# Patient Record
Sex: FEMALE | Race: WHITE | NOT HISPANIC OR LATINO | Employment: UNEMPLOYED | URBAN - METROPOLITAN AREA
[De-identification: names, ages, dates, MRNs, and addresses within clinical notes are randomized per-mention and may not be internally consistent; named-entity substitution may affect disease eponyms.]

---

## 2017-01-08 PROBLEM — R76.8 HCV ANTIBODY POSITIVE: Status: ACTIVE | Noted: 2017-01-08

## 2017-01-08 PROBLEM — B18.2 HEP C W/O COMA, CHRONIC: Status: ACTIVE | Noted: 2017-01-08

## 2017-03-14 PROBLEM — D64.9 SEVERE ANEMIA: Status: ACTIVE | Noted: 2017-03-14

## 2017-03-15 PROBLEM — F19.90 ILLICIT DRUG USE: Status: ACTIVE | Noted: 2017-03-15

## 2017-03-20 ENCOUNTER — NURSE TRIAGE (OUTPATIENT)
Dept: ADMINISTRATIVE | Facility: CLINIC | Age: 37
End: 2017-03-20

## 2017-03-20 ENCOUNTER — PATIENT OUTREACH (OUTPATIENT)
Dept: ADMINISTRATIVE | Facility: CLINIC | Age: 37
End: 2017-03-20
Payer: MEDICAID

## 2017-03-20 PROBLEM — A41.9 SEVERE SEPSIS: Status: ACTIVE | Noted: 2017-03-20

## 2017-03-20 PROBLEM — R65.20 SEVERE SEPSIS: Status: ACTIVE | Noted: 2017-03-20

## 2017-03-20 PROBLEM — R50.9 FEVER OF UNKNOWN ORIGIN: Status: ACTIVE | Noted: 2017-03-20

## 2017-03-20 NOTE — TELEPHONE ENCOUNTER
"  Reason for Disposition   MODERATE difficulty breathing (e.g., speaks in phrases, SOB even at rest, pulse 100-120) of new onset or worse than normal    Protocols used: ST BREATHING DIFFICULTY-A-OH      C3 nurse contacted Rachel Zazueta for a [facility] Discharge Follow Up call.    She reports she is having SOB, even at rest, that began today.    She is also "hurting all over."  Has 10/10 pain in lower back, around spin and bone marrow biopsy site.  States this pain is worse today then in was in hospital.    Also, she feels it hurts in chest when she swallows.  Describes it as heartburn.  States this began yesterday and she took Tums.  States it is still present today.  Denies chest pain during call.    Oral temp of 100.1 during call.    Patient advised to go to ED now per OOC protocol.  She verbalized understanding and will have S/O drive her.    "

## 2017-03-20 NOTE — PATIENT INSTRUCTIONS
Anemia, Type Not Specified (Adult)  Red blood cells carry oxygen to the tissues of your body. Anemia is a condition in which you have too few red blood cells. You need iron to make red blood cells. The most common cause of anemia is not having enough iron. This may be because of:  · Loss of blood. This can be caused by heavy menstrual periods. It can also be caused by bleeding from the stomach or intestines.  · Poor diet. You may not be eating enough foods that contain iron.  Other causes of anemia include certain vitamin deficiencies, chronic kidney disease, and certain other chronic illnesses.  Anemia makes you to feel tired and run down. When anemia becomes severe, your skin becomes pale. You may feel short of breath after physical activity. Other symptoms include:  · Headaches  · Dizziness  · Leg cramps with physical activity  · Drowsiness  Home care  Follow these guidelines when caring for yourself at home:  · Dont overexert yourself.  · Talk with your healthcare provider before traveling by air or traveling to high altitudes.  Follow-up care  Follow up with your healthcare provider, or as advised. You may need other blood tests to find out the exact cause of your anemia. If you had testing done today, it may take several days to get all of the results. You can follow up with your own healthcare provider to get the results.  When to seek medical advice  Call your healthcare provider right away if any of these occur:  · Shortness of breath or chest pain  · Dizziness or fainting gets worse  · Vomiting blood or passing red or black-colored stool  Date Last Reviewed: 2/25/2016  © 6387-6200 [employer]. [address] [place] [address], [place] [address]. All rights reserved. This information is not intended as a substitute for professional medical care. Always follow your healthcare professional's instructions.

## 2017-03-22 PROBLEM — R78.81 BACTEREMIA DUE TO GRAM-POSITIVE BACTERIA: Status: ACTIVE | Noted: 2017-03-20

## 2017-03-24 PROBLEM — B95.1 BACTEREMIA DUE TO GROUP B STREPTOCOCCUS: Status: ACTIVE | Noted: 2017-03-20

## 2018-10-04 PROBLEM — L03.115 CELLULITIS OF RIGHT LOWER EXTREMITY: Status: ACTIVE | Noted: 2018-10-04

## 2018-10-18 ENCOUNTER — TELEPHONE (OUTPATIENT)
Dept: ADMINISTRATIVE | Facility: HOSPITAL | Age: 38
End: 2018-10-18

## 2018-10-18 NOTE — TELEPHONE ENCOUNTER
Called spoke with patients family member verified patients name and , informed of the appointment reschedule date and time. Voiced understanding

## 2018-10-31 ENCOUNTER — TELEPHONE (OUTPATIENT)
Dept: ADMINISTRATIVE | Facility: HOSPITAL | Age: 38
End: 2018-10-31

## 2018-11-24 PROBLEM — M79.661 PAIN AND SWELLING OF RIGHT LOWER LEG: Status: ACTIVE | Noted: 2018-11-24

## 2018-11-24 PROBLEM — L03.90 CELLULITIS: Status: ACTIVE | Noted: 2018-11-24

## 2018-11-24 PROBLEM — M79.89 PAIN AND SWELLING OF RIGHT LOWER LEG: Status: ACTIVE | Noted: 2018-11-24

## 2019-05-13 PROBLEM — L03.116 CELLULITIS OF LEFT LOWER EXTREMITY: Status: ACTIVE | Noted: 2018-10-04

## 2019-05-14 PROBLEM — L03.116 LEFT LEG CELLULITIS: Status: RESOLVED | Noted: 2019-05-14 | Resolved: 2019-05-14

## 2019-05-14 PROBLEM — L03.116 LEFT LEG CELLULITIS: Status: ACTIVE | Noted: 2019-05-14

## 2019-05-15 PROBLEM — L03.116 LEFT LEG CELLULITIS: Status: ACTIVE | Noted: 2019-05-15

## 2019-05-16 PROBLEM — L03.116 LEFT LEG CELLULITIS: Status: RESOLVED | Noted: 2019-05-15 | Resolved: 2019-05-16

## 2019-05-24 PROBLEM — K74.60 CHRONIC HEPATITIS C WITH CIRRHOSIS: Status: ACTIVE | Noted: 2017-01-08

## 2021-03-20 ENCOUNTER — HOSPITAL ENCOUNTER (EMERGENCY)
Facility: HOSPITAL | Age: 41
Discharge: HOME | End: 2021-03-20
Attending: EMERGENCY MEDICINE
Payer: MEDICAID

## 2021-03-20 VITALS
OXYGEN SATURATION: 100 % | HEART RATE: 89 BPM | DIASTOLIC BLOOD PRESSURE: 64 MMHG | BODY MASS INDEX: 45.99 KG/M2 | TEMPERATURE: 98 F | SYSTOLIC BLOOD PRESSURE: 113 MMHG | WEIGHT: 293 LBS | HEIGHT: 67 IN | RESPIRATION RATE: 16 BRPM

## 2021-03-20 DIAGNOSIS — M54.50 LUMBAR PAIN WITH RADIATION DOWN LEFT LEG: Primary | ICD-10-CM

## 2021-03-20 DIAGNOSIS — N30.00 ACUTE CYSTITIS WITHOUT HEMATURIA: ICD-10-CM

## 2021-03-20 DIAGNOSIS — M79.605 LUMBAR PAIN WITH RADIATION DOWN LEFT LEG: Primary | ICD-10-CM

## 2021-03-20 LAB
ALBUMIN SERPL BCP-MCNC: 2.3 G/DL (ref 3.5–5.2)
ALP SERPL-CCNC: 82 U/L (ref 55–135)
ALT SERPL W/O P-5'-P-CCNC: 40 U/L (ref 10–44)
AMMONIA PLAS-SCNC: 40 UMOL/L (ref 10–50)
AMPHET+METHAMPHET UR QL: ABNORMAL
ANION GAP SERPL CALC-SCNC: 4 MMOL/L (ref 8–16)
AST SERPL-CCNC: 43 U/L (ref 10–40)
BACTERIA #/AREA URNS HPF: NEGATIVE /HPF
BARBITURATES UR QL SCN>200 NG/ML: NEGATIVE
BASOPHILS # BLD AUTO: 0.05 K/UL (ref 0–0.2)
BASOPHILS NFR BLD: 1 % (ref 0–1.9)
BENZODIAZ UR QL SCN>200 NG/ML: NEGATIVE
BILIRUB SERPL-MCNC: 1.6 MG/DL (ref 0.1–1)
BILIRUB UR QL STRIP: NEGATIVE
BUN SERPL-MCNC: 9 MG/DL (ref 6–20)
BZE UR QL SCN: NEGATIVE
CALCIUM SERPL-MCNC: 7.4 MG/DL (ref 8.7–10.5)
CANNABINOIDS UR QL SCN: NEGATIVE
CHLORIDE SERPL-SCNC: 108 MMOL/L (ref 95–110)
CLARITY UR: CLEAR
CO2 SERPL-SCNC: 28 MMOL/L (ref 23–29)
COLOR UR: YELLOW
CREAT SERPL-MCNC: 0.6 MG/DL (ref 0.5–1.4)
CREAT UR-MCNC: <13 MG/DL (ref 15–325)
DIFFERENTIAL METHOD: ABNORMAL
EOSINOPHIL # BLD AUTO: 0 K/UL (ref 0–0.5)
EOSINOPHIL NFR BLD: 0.8 % (ref 0–8)
ERYTHROCYTE [DISTWIDTH] IN BLOOD BY AUTOMATED COUNT: 14.7 % (ref 11.5–14.5)
EST. GFR  (AFRICAN AMERICAN): >60 ML/MIN/1.73 M^2
EST. GFR  (NON AFRICAN AMERICAN): >60 ML/MIN/1.73 M^2
GLUCOSE SERPL-MCNC: 87 MG/DL (ref 70–110)
GLUCOSE UR QL STRIP: NEGATIVE
HCT VFR BLD AUTO: 36.1 % (ref 37–48.5)
HGB BLD-MCNC: 12 G/DL (ref 12–16)
HGB UR QL STRIP: ABNORMAL
HYALINE CASTS #/AREA URNS LPF: 3 /LPF
IMM GRANULOCYTES # BLD AUTO: 0.18 K/UL (ref 0–0.04)
IMM GRANULOCYTES NFR BLD AUTO: 3.6 % (ref 0–0.5)
INR PPP: 1.3 (ref 0.8–1.2)
KETONES UR QL STRIP: NEGATIVE
LEUKOCYTE ESTERASE UR QL STRIP: NEGATIVE
LIPASE SERPL-CCNC: 166 U/L (ref 23–300)
LYMPHOCYTES # BLD AUTO: 0.6 K/UL (ref 1–4.8)
LYMPHOCYTES NFR BLD: 12.5 % (ref 18–48)
MCH RBC QN AUTO: 29.1 PG (ref 27–31)
MCHC RBC AUTO-ENTMCNC: 33.2 G/DL (ref 32–36)
MCV RBC AUTO: 88 FL (ref 82–98)
METHADONE UR QL SCN>300 NG/ML: NEGATIVE
MICROSCOPIC COMMENT: ABNORMAL
MONOCYTES # BLD AUTO: 0.8 K/UL (ref 0.3–1)
MONOCYTES NFR BLD: 15.5 % (ref 4–15)
NEUTROPHILS # BLD AUTO: 3.4 K/UL (ref 1.8–7.7)
NEUTROPHILS NFR BLD: 66.6 % (ref 38–73)
NITRITE UR QL STRIP: POSITIVE
NRBC BLD-RTO: 0 /100 WBC
OPIATES UR QL SCN: NEGATIVE
PCP UR QL SCN>25 NG/ML: NEGATIVE
PH UR STRIP: 5 [PH] (ref 5–8)
PLATELET # BLD AUTO: 40 K/UL (ref 150–350)
PMV BLD AUTO: 10.9 FL (ref 9.2–12.9)
POTASSIUM SERPL-SCNC: 3.7 MMOL/L (ref 3.5–5.1)
PROT SERPL-MCNC: 6.1 G/DL (ref 6–8.4)
PROT UR QL STRIP: NEGATIVE
PROTHROMBIN TIME: 13.5 SEC (ref 9–12.5)
RBC # BLD AUTO: 4.12 M/UL (ref 4–5.4)
RBC #/AREA URNS HPF: 6 /HPF (ref 0–4)
SODIUM SERPL-SCNC: 140 MMOL/L (ref 136–145)
SP GR UR STRIP: 1.01 (ref 1–1.03)
SQUAMOUS #/AREA URNS HPF: 1 /HPF
TOXICOLOGY INFORMATION: ABNORMAL
URN SPEC COLLECT METH UR: ABNORMAL
UROBILINOGEN UR STRIP-ACNC: 1 EU/DL
WBC # BLD AUTO: 5.04 K/UL (ref 3.9–12.7)
WBC #/AREA URNS HPF: 2 /HPF (ref 0–5)

## 2021-03-20 PROCEDURE — 99284 EMERGENCY DEPT VISIT MOD MDM: CPT | Mod: 25

## 2021-03-20 PROCEDURE — 96372 THER/PROPH/DIAG INJ SC/IM: CPT | Mod: 59

## 2021-03-20 PROCEDURE — 87086 URINE CULTURE/COLONY COUNT: CPT | Performed by: EMERGENCY MEDICINE

## 2021-03-20 PROCEDURE — 96365 THER/PROPH/DIAG IV INF INIT: CPT

## 2021-03-20 PROCEDURE — 80053 COMPREHEN METABOLIC PANEL: CPT | Performed by: EMERGENCY MEDICINE

## 2021-03-20 PROCEDURE — 96375 TX/PRO/DX INJ NEW DRUG ADDON: CPT

## 2021-03-20 PROCEDURE — 82140 ASSAY OF AMMONIA: CPT | Performed by: EMERGENCY MEDICINE

## 2021-03-20 PROCEDURE — 96360 HYDRATION IV INFUSION INIT: CPT

## 2021-03-20 PROCEDURE — 63600175 PHARM REV CODE 636 W HCPCS: Performed by: EMERGENCY MEDICINE

## 2021-03-20 PROCEDURE — 85025 COMPLETE CBC W/AUTO DIFF WBC: CPT | Performed by: EMERGENCY MEDICINE

## 2021-03-20 PROCEDURE — 99284 EMERGENCY DEPT VISIT MOD MDM: CPT | Mod: 25,27

## 2021-03-20 PROCEDURE — 25000003 PHARM REV CODE 250: Performed by: EMERGENCY MEDICINE

## 2021-03-20 PROCEDURE — 80307 DRUG TEST PRSMV CHEM ANLYZR: CPT | Performed by: EMERGENCY MEDICINE

## 2021-03-20 PROCEDURE — 83690 ASSAY OF LIPASE: CPT | Performed by: EMERGENCY MEDICINE

## 2021-03-20 PROCEDURE — 81000 URINALYSIS NONAUTO W/SCOPE: CPT | Mod: 59 | Performed by: EMERGENCY MEDICINE

## 2021-03-20 RX ORDER — TRAMADOL HYDROCHLORIDE 50 MG/1
50 TABLET ORAL EVERY 6 HOURS PRN
Qty: 12 TABLET | Refills: 0 | Status: ON HOLD | OUTPATIENT
Start: 2021-03-20 | End: 2021-04-15

## 2021-03-20 RX ORDER — HYDROMORPHONE HYDROCHLORIDE 1 MG/ML
0.5 INJECTION, SOLUTION INTRAMUSCULAR; INTRAVENOUS; SUBCUTANEOUS
Status: COMPLETED | OUTPATIENT
Start: 2021-03-20 | End: 2021-03-20

## 2021-03-20 RX ORDER — TRAMADOL HYDROCHLORIDE 50 MG/1
50 TABLET ORAL EVERY 6 HOURS PRN
Qty: 12 TABLET | Refills: 0 | Status: SHIPPED | OUTPATIENT
Start: 2021-03-20 | End: 2021-03-20 | Stop reason: SDUPTHER

## 2021-03-20 RX ORDER — BACLOFEN 10 MG/1
10 TABLET ORAL 3 TIMES DAILY
Qty: 25 TABLET | Refills: 0 | Status: ON HOLD | OUTPATIENT
Start: 2021-03-20 | End: 2021-04-15

## 2021-03-20 RX ORDER — BACLOFEN 10 MG/1
10 TABLET ORAL 3 TIMES DAILY
Qty: 25 TABLET | Refills: 0 | Status: SHIPPED | OUTPATIENT
Start: 2021-03-20 | End: 2021-03-20 | Stop reason: SDUPTHER

## 2021-03-20 RX ORDER — BACLOFEN 10 MG/1
10 TABLET ORAL
Status: COMPLETED | OUTPATIENT
Start: 2021-03-20 | End: 2021-03-20

## 2021-03-20 RX ADMIN — HYDROMORPHONE HYDROCHLORIDE 0.5 MG: 1 INJECTION, SOLUTION INTRAMUSCULAR; INTRAVENOUS; SUBCUTANEOUS at 08:03

## 2021-03-20 RX ADMIN — BACLOFEN 10 MG: 10 TABLET ORAL at 08:03

## 2021-03-20 RX ADMIN — CEFTRIAXONE 1 G: 1 INJECTION, SOLUTION INTRAVENOUS at 08:03

## 2021-03-21 ENCOUNTER — HOSPITAL ENCOUNTER (EMERGENCY)
Facility: HOSPITAL | Age: 41
Discharge: HOME | End: 2021-03-21
Attending: EMERGENCY MEDICINE
Payer: MEDICAID

## 2021-03-21 VITALS
RESPIRATION RATE: 18 BRPM | BODY MASS INDEX: 45.99 KG/M2 | WEIGHT: 293 LBS | DIASTOLIC BLOOD PRESSURE: 77 MMHG | TEMPERATURE: 98 F | HEART RATE: 92 BPM | HEIGHT: 67 IN | SYSTOLIC BLOOD PRESSURE: 133 MMHG | OXYGEN SATURATION: 98 %

## 2021-03-21 DIAGNOSIS — M54.31 SCIATICA OF RIGHT SIDE: Primary | ICD-10-CM

## 2021-03-21 LAB
ALBUMIN SERPL BCP-MCNC: 2.5 G/DL (ref 3.5–5.2)
ALP SERPL-CCNC: 94 U/L (ref 55–135)
ALT SERPL W/O P-5'-P-CCNC: 42 U/L (ref 10–44)
ANION GAP SERPL CALC-SCNC: 5 MMOL/L (ref 8–16)
AST SERPL-CCNC: 46 U/L (ref 10–40)
BACTERIA UR CULT: NO GROWTH
BASOPHILS # BLD AUTO: ABNORMAL K/UL (ref 0–0.2)
BASOPHILS NFR BLD: 0 % (ref 0–1.9)
BILIRUB SERPL-MCNC: 1.8 MG/DL (ref 0.1–1)
BUN SERPL-MCNC: 11 MG/DL (ref 6–20)
CALCIUM SERPL-MCNC: 7.9 MG/DL (ref 8.7–10.5)
CHLORIDE SERPL-SCNC: 105 MMOL/L (ref 95–110)
CO2 SERPL-SCNC: 31 MMOL/L (ref 23–29)
CREAT SERPL-MCNC: 0.6 MG/DL (ref 0.5–1.4)
DIFFERENTIAL METHOD: ABNORMAL
EOSINOPHIL # BLD AUTO: ABNORMAL K/UL (ref 0–0.5)
EOSINOPHIL NFR BLD: 1 % (ref 0–8)
ERYTHROCYTE [DISTWIDTH] IN BLOOD BY AUTOMATED COUNT: 15.1 % (ref 11.5–14.5)
EST. GFR  (AFRICAN AMERICAN): >60 ML/MIN/1.73 M^2
EST. GFR  (NON AFRICAN AMERICAN): >60 ML/MIN/1.73 M^2
GLUCOSE SERPL-MCNC: 95 MG/DL (ref 70–110)
HCT VFR BLD AUTO: 37.8 % (ref 37–48.5)
HGB BLD-MCNC: 12.6 G/DL (ref 12–16)
IMM GRANULOCYTES # BLD AUTO: ABNORMAL K/UL (ref 0–0.04)
IMM GRANULOCYTES NFR BLD AUTO: ABNORMAL % (ref 0–0.5)
LIPASE SERPL-CCNC: 136 U/L (ref 23–300)
LYMPHOCYTES # BLD AUTO: ABNORMAL K/UL (ref 1–4.8)
LYMPHOCYTES NFR BLD: 16 % (ref 18–48)
MCH RBC QN AUTO: 29.5 PG (ref 27–31)
MCHC RBC AUTO-ENTMCNC: 33.3 G/DL (ref 32–36)
MCV RBC AUTO: 89 FL (ref 82–98)
MONOCYTES # BLD AUTO: ABNORMAL K/UL (ref 0.3–1)
MONOCYTES NFR BLD: 13 % (ref 4–15)
MYELOCYTES NFR BLD MANUAL: 1 %
NEUTROPHILS NFR BLD: 39 % (ref 38–73)
NEUTS BAND NFR BLD MANUAL: 30 %
NRBC BLD-RTO: 0 /100 WBC
PLATELET # BLD AUTO: 44 K/UL (ref 150–350)
PMV BLD AUTO: 10.2 FL (ref 9.2–12.9)
POTASSIUM SERPL-SCNC: 4 MMOL/L (ref 3.5–5.1)
PROT SERPL-MCNC: 6.5 G/DL (ref 6–8.4)
RBC # BLD AUTO: 4.27 M/UL (ref 4–5.4)
SODIUM SERPL-SCNC: 141 MMOL/L (ref 136–145)
WBC # BLD AUTO: 5.73 K/UL (ref 3.9–12.7)

## 2021-03-21 PROCEDURE — 83690 ASSAY OF LIPASE: CPT | Performed by: EMERGENCY MEDICINE

## 2021-03-21 PROCEDURE — 80053 COMPREHEN METABOLIC PANEL: CPT | Performed by: EMERGENCY MEDICINE

## 2021-03-21 PROCEDURE — 85007 BL SMEAR W/DIFF WBC COUNT: CPT | Performed by: EMERGENCY MEDICINE

## 2021-03-21 PROCEDURE — 25000003 PHARM REV CODE 250: Performed by: EMERGENCY MEDICINE

## 2021-03-21 PROCEDURE — 36415 COLL VENOUS BLD VENIPUNCTURE: CPT | Performed by: EMERGENCY MEDICINE

## 2021-03-21 PROCEDURE — 63600175 PHARM REV CODE 636 W HCPCS: Performed by: EMERGENCY MEDICINE

## 2021-03-21 PROCEDURE — 85027 COMPLETE CBC AUTOMATED: CPT | Performed by: EMERGENCY MEDICINE

## 2021-03-21 RX ORDER — METHYLPREDNISOLONE 4 MG/1
TABLET ORAL
Qty: 1 PACKAGE | Refills: 0 | Status: SHIPPED | OUTPATIENT
Start: 2021-03-21 | End: 2021-04-11

## 2021-03-21 RX ORDER — OXYCODONE AND ACETAMINOPHEN 10; 325 MG/1; MG/1
1 TABLET ORAL EVERY 8 HOURS PRN
Qty: 8 TABLET | Refills: 0 | Status: ON HOLD | OUTPATIENT
Start: 2021-03-21 | End: 2021-04-15

## 2021-03-21 RX ORDER — HYDROMORPHONE HYDROCHLORIDE 2 MG/ML
2 INJECTION, SOLUTION INTRAMUSCULAR; INTRAVENOUS; SUBCUTANEOUS
Status: COMPLETED | OUTPATIENT
Start: 2021-03-21 | End: 2021-03-21

## 2021-03-21 RX ORDER — DEXAMETHASONE SODIUM PHOSPHATE 4 MG/ML
8 INJECTION, SOLUTION INTRA-ARTICULAR; INTRALESIONAL; INTRAMUSCULAR; INTRAVENOUS; SOFT TISSUE
Status: COMPLETED | OUTPATIENT
Start: 2021-03-21 | End: 2021-03-21

## 2021-03-21 RX ADMIN — DEXAMETHASONE SODIUM PHOSPHATE 8 MG: 4 INJECTION INTRA-ARTICULAR; INTRALESIONAL; INTRAMUSCULAR; INTRAVENOUS; SOFT TISSUE at 01:03

## 2021-03-21 RX ADMIN — SODIUM CHLORIDE 1000 ML: 0.9 INJECTION, SOLUTION INTRAVENOUS at 12:03

## 2021-03-21 RX ADMIN — HYDROMORPHONE HYDROCHLORIDE 2 MG: 2 INJECTION, SOLUTION INTRAMUSCULAR; INTRAVENOUS; SUBCUTANEOUS at 01:03

## 2021-04-07 ENCOUNTER — HOSPITAL ENCOUNTER (EMERGENCY)
Facility: HOSPITAL | Age: 41
Discharge: HOME | End: 2021-04-07
Attending: FAMILY MEDICINE
Payer: MEDICAID

## 2021-04-07 VITALS
BODY MASS INDEX: 46.99 KG/M2 | DIASTOLIC BLOOD PRESSURE: 65 MMHG | TEMPERATURE: 98 F | RESPIRATION RATE: 18 BRPM | SYSTOLIC BLOOD PRESSURE: 135 MMHG | OXYGEN SATURATION: 99 % | HEIGHT: 67 IN | HEART RATE: 115 BPM

## 2021-04-07 DIAGNOSIS — S39.012A STRAIN OF LUMBAR REGION, INITIAL ENCOUNTER: ICD-10-CM

## 2021-04-07 DIAGNOSIS — M54.31 SCIATICA OF RIGHT SIDE: Primary | ICD-10-CM

## 2021-04-07 PROCEDURE — 96372 THER/PROPH/DIAG INJ SC/IM: CPT | Mod: 59

## 2021-04-07 PROCEDURE — 25000003 PHARM REV CODE 250: Performed by: FAMILY MEDICINE

## 2021-04-07 PROCEDURE — 99284 EMERGENCY DEPT VISIT MOD MDM: CPT | Mod: 25

## 2021-04-07 PROCEDURE — 63600175 PHARM REV CODE 636 W HCPCS: Performed by: FAMILY MEDICINE

## 2021-04-07 RX ORDER — KETOROLAC TROMETHAMINE 30 MG/ML
30 INJECTION, SOLUTION INTRAMUSCULAR; INTRAVENOUS
Status: COMPLETED | OUTPATIENT
Start: 2021-04-07 | End: 2021-04-07

## 2021-04-07 RX ORDER — KETOROLAC TROMETHAMINE 10 MG/1
10 TABLET, FILM COATED ORAL EVERY 6 HOURS
Qty: 20 TABLET | Refills: 0 | Status: SHIPPED | OUTPATIENT
Start: 2021-04-07 | End: 2021-04-12

## 2021-04-07 RX ORDER — DEXAMETHASONE SODIUM PHOSPHATE 4 MG/ML
12 INJECTION, SOLUTION INTRA-ARTICULAR; INTRALESIONAL; INTRAMUSCULAR; INTRAVENOUS; SOFT TISSUE
Status: COMPLETED | OUTPATIENT
Start: 2021-04-07 | End: 2021-04-07

## 2021-04-07 RX ORDER — HYDROCODONE BITARTRATE AND ACETAMINOPHEN 10; 325 MG/1; MG/1
1 TABLET ORAL
Status: COMPLETED | OUTPATIENT
Start: 2021-04-07 | End: 2021-04-07

## 2021-04-07 RX ORDER — HYDROCODONE BITARTRATE AND ACETAMINOPHEN 5; 325 MG/1; MG/1
1 TABLET ORAL EVERY 4 HOURS PRN
Qty: 6 TABLET | Refills: 0 | Status: ON HOLD | OUTPATIENT
Start: 2021-04-07 | End: 2021-04-15

## 2021-04-07 RX ADMIN — KETOROLAC TROMETHAMINE 30 MG: 30 INJECTION, SOLUTION INTRAMUSCULAR; INTRAVENOUS at 12:04

## 2021-04-07 RX ADMIN — DEXAMETHASONE SODIUM PHOSPHATE 12 MG: 4 INJECTION, SOLUTION INTRA-ARTICULAR; INTRALESIONAL; INTRAMUSCULAR; INTRAVENOUS; SOFT TISSUE at 12:04

## 2021-04-07 RX ADMIN — HYDROCODONE BITARTRATE AND ACETAMINOPHEN 1 TABLET: 10; 325 TABLET ORAL at 12:04

## 2021-04-09 ENCOUNTER — PATIENT OUTREACH (OUTPATIENT)
Dept: EMERGENCY MEDICINE | Facility: HOSPITAL | Age: 41
End: 2021-04-09

## 2021-04-13 ENCOUNTER — HOSPITAL ENCOUNTER (EMERGENCY)
Facility: HOSPITAL | Age: 41
Discharge: ANOTHER HOSPITAL | End: 2021-04-14
Attending: EMERGENCY MEDICINE
Payer: MEDICAID

## 2021-04-13 DIAGNOSIS — R32 URINARY INCONTINENCE, UNSPECIFIED TYPE: ICD-10-CM

## 2021-04-13 DIAGNOSIS — M54.9 BACK PAIN, UNSPECIFIED BACK LOCATION, UNSPECIFIED BACK PAIN LATERALITY, UNSPECIFIED CHRONICITY: Primary | ICD-10-CM

## 2021-04-13 LAB
BASOPHILS # BLD AUTO: 0.03 K/UL (ref 0–0.2)
BASOPHILS NFR BLD: 0.7 % (ref 0–1.9)
DIFFERENTIAL METHOD: ABNORMAL
EOSINOPHIL # BLD AUTO: 0.1 K/UL (ref 0–0.5)
EOSINOPHIL NFR BLD: 1.6 % (ref 0–8)
ERYTHROCYTE [DISTWIDTH] IN BLOOD BY AUTOMATED COUNT: 15 % (ref 11.5–14.5)
HCT VFR BLD AUTO: 32.4 % (ref 37–48.5)
HGB BLD-MCNC: 10.8 G/DL (ref 12–16)
IMM GRANULOCYTES # BLD AUTO: 0.04 K/UL (ref 0–0.04)
IMM GRANULOCYTES NFR BLD AUTO: 0.9 % (ref 0–0.5)
LYMPHOCYTES # BLD AUTO: 0.6 K/UL (ref 1–4.8)
LYMPHOCYTES NFR BLD: 13.1 % (ref 18–48)
MCH RBC QN AUTO: 29.7 PG (ref 27–31)
MCHC RBC AUTO-ENTMCNC: 33.3 G/DL (ref 32–36)
MCV RBC AUTO: 89 FL (ref 82–98)
MONOCYTES # BLD AUTO: 0.4 K/UL (ref 0.3–1)
MONOCYTES NFR BLD: 10.1 % (ref 4–15)
NEUTROPHILS # BLD AUTO: 3.2 K/UL (ref 1.8–7.7)
NEUTROPHILS NFR BLD: 73.6 % (ref 38–73)
NRBC BLD-RTO: 0 /100 WBC
PLATELET # BLD AUTO: 74 K/UL (ref 150–450)
PMV BLD AUTO: 9.3 FL (ref 9.2–12.9)
RBC # BLD AUTO: 3.64 M/UL (ref 4–5.4)
WBC # BLD AUTO: 4.34 K/UL (ref 3.9–12.7)

## 2021-04-13 PROCEDURE — 25000003 PHARM REV CODE 250: Performed by: EMERGENCY MEDICINE

## 2021-04-13 PROCEDURE — 85025 COMPLETE CBC W/AUTO DIFF WBC: CPT | Performed by: EMERGENCY MEDICINE

## 2021-04-13 PROCEDURE — 80053 COMPREHEN METABOLIC PANEL: CPT | Performed by: EMERGENCY MEDICINE

## 2021-04-13 PROCEDURE — 96375 TX/PRO/DX INJ NEW DRUG ADDON: CPT

## 2021-04-13 PROCEDURE — 99285 EMERGENCY DEPT VISIT HI MDM: CPT | Mod: 25

## 2021-04-13 PROCEDURE — 36415 COLL VENOUS BLD VENIPUNCTURE: CPT | Performed by: EMERGENCY MEDICINE

## 2021-04-13 PROCEDURE — 63600175 PHARM REV CODE 636 W HCPCS: Performed by: EMERGENCY MEDICINE

## 2021-04-13 PROCEDURE — 96374 THER/PROPH/DIAG INJ IV PUSH: CPT

## 2021-04-13 RX ORDER — HYDROCODONE BITARTRATE AND ACETAMINOPHEN 5; 325 MG/1; MG/1
1 TABLET ORAL
Status: COMPLETED | OUTPATIENT
Start: 2021-04-13 | End: 2021-04-13

## 2021-04-13 RX ORDER — METHYLPREDNISOLONE SOD SUCC 125 MG
125 VIAL (EA) INJECTION
Status: COMPLETED | OUTPATIENT
Start: 2021-04-13 | End: 2021-04-13

## 2021-04-13 RX ORDER — KETOROLAC TROMETHAMINE 30 MG/ML
15 INJECTION, SOLUTION INTRAMUSCULAR; INTRAVENOUS
Status: COMPLETED | OUTPATIENT
Start: 2021-04-13 | End: 2021-04-13

## 2021-04-13 RX ADMIN — HYDROCODONE BITARTRATE AND ACETAMINOPHEN 1 TABLET: 5; 325 TABLET ORAL at 11:04

## 2021-04-13 RX ADMIN — KETOROLAC TROMETHAMINE 15 MG: 30 INJECTION, SOLUTION INTRAMUSCULAR; INTRAVENOUS at 11:04

## 2021-04-13 RX ADMIN — METHYLPREDNISOLONE SODIUM SUCCINATE 125 MG: 125 INJECTION, POWDER, FOR SOLUTION INTRAMUSCULAR; INTRAVENOUS at 11:04

## 2021-04-14 ENCOUNTER — HOSPITAL ENCOUNTER (INPATIENT)
Facility: HOSPITAL | Age: 41
LOS: 13 days | Discharge: NURSING, REHAB OR LONG-TERM CARE | DRG: 029 | End: 2021-04-27
Attending: EMERGENCY MEDICINE | Admitting: STUDENT IN AN ORGANIZED HEALTH CARE EDUCATION/TRAINING PROGRAM
Payer: MEDICAID

## 2021-04-14 VITALS
OXYGEN SATURATION: 100 % | HEIGHT: 67 IN | SYSTOLIC BLOOD PRESSURE: 141 MMHG | DIASTOLIC BLOOD PRESSURE: 71 MMHG | HEART RATE: 93 BPM | TEMPERATURE: 98 F | WEIGHT: 230 LBS | RESPIRATION RATE: 17 BRPM | BODY MASS INDEX: 36.1 KG/M2

## 2021-04-14 DIAGNOSIS — F17.200 TOBACCO USE DISORDER: Chronic | ICD-10-CM

## 2021-04-14 DIAGNOSIS — B95.5 STREPTOCOCCAL BACTEREMIA: ICD-10-CM

## 2021-04-14 DIAGNOSIS — R78.81 STREPTOCOCCAL BACTEREMIA: ICD-10-CM

## 2021-04-14 DIAGNOSIS — R09.89 SUSPECTED ENDOCARDITIS: ICD-10-CM

## 2021-04-14 DIAGNOSIS — K74.60 CIRRHOSIS OF LIVER WITHOUT ASCITES, UNSPECIFIED HEPATIC CIRRHOSIS TYPE: ICD-10-CM

## 2021-04-14 DIAGNOSIS — B18.2 CHRONIC HEPATITIS C WITH CIRRHOSIS: ICD-10-CM

## 2021-04-14 DIAGNOSIS — F15.20 AMPHETAMINE USE DISORDER, SEVERE: Chronic | ICD-10-CM

## 2021-04-14 DIAGNOSIS — G06.1 ABSCESS IN EPIDURAL SPACE OF LUMBAR SPINE: Primary | ICD-10-CM

## 2021-04-14 DIAGNOSIS — K74.60 CHRONIC HEPATITIS C WITH CIRRHOSIS: ICD-10-CM

## 2021-04-14 DIAGNOSIS — M43.16 SPONDYLOLISTHESIS AT L4-L5 LEVEL: ICD-10-CM

## 2021-04-14 DIAGNOSIS — M46.40 SPONDYLODISCITIS: ICD-10-CM

## 2021-04-14 DIAGNOSIS — F12.20 CANNABIS USE DISORDER, SEVERE, DEPENDENCE: Chronic | ICD-10-CM

## 2021-04-14 DIAGNOSIS — M48.061 SPINAL STENOSIS AT L4-L5 LEVEL: ICD-10-CM

## 2021-04-14 DIAGNOSIS — R29.898 WEAKNESS OF LEFT LOWER EXTREMITY: ICD-10-CM

## 2021-04-14 PROBLEM — L03.116 BILATERAL CELLULITIS OF LOWER LEG: Status: RESOLVED | Noted: 2018-10-04 | Resolved: 2021-04-14

## 2021-04-14 PROBLEM — M79.89 PAIN AND SWELLING OF RIGHT LOWER LEG: Status: RESOLVED | Noted: 2018-11-24 | Resolved: 2021-04-14

## 2021-04-14 PROBLEM — Z72.0 TOBACCO ABUSE: Status: ACTIVE | Noted: 2021-04-14

## 2021-04-14 PROBLEM — L03.115 BILATERAL CELLULITIS OF LOWER LEG: Status: RESOLVED | Noted: 2018-10-04 | Resolved: 2021-04-14

## 2021-04-14 PROBLEM — L03.115 CELLULITIS OF RIGHT LOWER EXTREMITY: Status: RESOLVED | Noted: 2018-10-04 | Resolved: 2021-04-14

## 2021-04-14 PROBLEM — K59.00 CONSTIPATION: Status: ACTIVE | Noted: 2021-04-14

## 2021-04-14 PROBLEM — D69.6 THROMBOCYTOPENIA: Status: ACTIVE | Noted: 2021-04-14

## 2021-04-14 PROBLEM — B95.1 BACTEREMIA DUE TO GROUP B STREPTOCOCCUS: Status: RESOLVED | Noted: 2017-03-20 | Resolved: 2021-04-14

## 2021-04-14 PROBLEM — L03.90 CELLULITIS: Status: RESOLVED | Noted: 2018-11-24 | Resolved: 2021-04-14

## 2021-04-14 PROBLEM — Z01.818 PREOPERATIVE EXAMINATION: Status: ACTIVE | Noted: 2021-04-14

## 2021-04-14 PROBLEM — M79.661 PAIN AND SWELLING OF RIGHT LOWER LEG: Status: RESOLVED | Noted: 2018-11-24 | Resolved: 2021-04-14

## 2021-04-14 LAB
ALBUMIN SERPL BCP-MCNC: 2.3 G/DL (ref 3.5–5.2)
ALP SERPL-CCNC: 115 U/L (ref 55–135)
ALT SERPL W/O P-5'-P-CCNC: 32 U/L (ref 10–44)
AMPHET+METHAMPHET UR QL: NORMAL
ANION GAP SERPL CALC-SCNC: 4 MMOL/L (ref 8–16)
AST SERPL-CCNC: 41 U/L (ref 10–40)
B-HCG UR QL: NEGATIVE
BARBITURATES UR QL SCN>200 NG/ML: NEGATIVE
BENZODIAZ UR QL SCN>200 NG/ML: NEGATIVE
BILIRUB SERPL-MCNC: 1.3 MG/DL (ref 0.1–1)
BUN SERPL-MCNC: 16 MG/DL (ref 6–20)
BZE UR QL SCN: NEGATIVE
CALCIUM SERPL-MCNC: 8.1 MG/DL (ref 8.7–10.5)
CANNABINOIDS UR QL SCN: NORMAL
CHLORIDE SERPL-SCNC: 108 MMOL/L (ref 95–110)
CO2 SERPL-SCNC: 30 MMOL/L (ref 23–29)
CREAT SERPL-MCNC: 0.6 MG/DL (ref 0.5–1.4)
CREAT UR-MCNC: 191 MG/DL (ref 15–325)
CRP SERPL-MCNC: 47.5 MG/L (ref 0–8.2)
CTP QC/QA: YES
CTP QC/QA: YES
ERYTHROCYTE [SEDIMENTATION RATE] IN BLOOD BY WESTERGREN METHOD: 57 MM/HR (ref 0–36)
EST. GFR  (AFRICAN AMERICAN): >60 ML/MIN/1.73 M^2
EST. GFR  (NON AFRICAN AMERICAN): >60 ML/MIN/1.73 M^2
ETHANOL UR-MCNC: <10 MG/DL
GLUCOSE SERPL-MCNC: 103 MG/DL (ref 70–110)
HIV 1+2 AB+HIV1 P24 AG SERPL QL IA: NEGATIVE
METHADONE UR QL SCN>300 NG/ML: NEGATIVE
OPIATES UR QL SCN: NORMAL
PCP UR QL SCN>25 NG/ML: NEGATIVE
POCT GLUCOSE: 144 MG/DL (ref 70–110)
POTASSIUM SERPL-SCNC: 3.8 MMOL/L (ref 3.5–5.1)
PROCALCITONIN SERPL IA-MCNC: 0.04 NG/ML
PROT SERPL-MCNC: 7.1 G/DL (ref 6–8.4)
SARS-COV-2 RDRP RESP QL NAA+PROBE: NEGATIVE
SODIUM SERPL-SCNC: 142 MMOL/L (ref 136–145)
TOXICOLOGY INFORMATION: NORMAL

## 2021-04-14 PROCEDURE — 99233 SBSQ HOSP IP/OBS HIGH 50: CPT | Mod: ,,, | Performed by: PHYSICIAN ASSISTANT

## 2021-04-14 PROCEDURE — 99223 PR INITIAL HOSPITAL CARE,LEVL III: ICD-10-PCS | Mod: ,,, | Performed by: INTERNAL MEDICINE

## 2021-04-14 PROCEDURE — 81025 URINE PREGNANCY TEST: CPT | Performed by: STUDENT IN AN ORGANIZED HEALTH CARE EDUCATION/TRAINING PROGRAM

## 2021-04-14 PROCEDURE — 99223 1ST HOSP IP/OBS HIGH 75: CPT | Mod: ,,, | Performed by: INTERNAL MEDICINE

## 2021-04-14 PROCEDURE — 25000003 PHARM REV CODE 250: Performed by: STUDENT IN AN ORGANIZED HEALTH CARE EDUCATION/TRAINING PROGRAM

## 2021-04-14 PROCEDURE — 96375 TX/PRO/DX INJ NEW DRUG ADDON: CPT

## 2021-04-14 PROCEDURE — 80307 DRUG TEST PRSMV CHEM ANLYZR: CPT | Performed by: STUDENT IN AN ORGANIZED HEALTH CARE EDUCATION/TRAINING PROGRAM

## 2021-04-14 PROCEDURE — 99291 CRITICAL CARE FIRST HOUR: CPT | Mod: ,,, | Performed by: EMERGENCY MEDICINE

## 2021-04-14 PROCEDURE — 63600175 PHARM REV CODE 636 W HCPCS: Performed by: PHYSICIAN ASSISTANT

## 2021-04-14 PROCEDURE — 86703 HIV-1/HIV-2 1 RESULT ANTBDY: CPT | Performed by: EMERGENCY MEDICINE

## 2021-04-14 PROCEDURE — 85652 RBC SED RATE AUTOMATED: CPT | Performed by: STUDENT IN AN ORGANIZED HEALTH CARE EDUCATION/TRAINING PROGRAM

## 2021-04-14 PROCEDURE — 63600175 PHARM REV CODE 636 W HCPCS: Performed by: STUDENT IN AN ORGANIZED HEALTH CARE EDUCATION/TRAINING PROGRAM

## 2021-04-14 PROCEDURE — 99223 PR INITIAL HOSPITAL CARE,LEVL III: ICD-10-PCS | Mod: ,,, | Performed by: STUDENT IN AN ORGANIZED HEALTH CARE EDUCATION/TRAINING PROGRAM

## 2021-04-14 PROCEDURE — 99291 PR CRITICAL CARE, E/M 30-74 MINUTES: ICD-10-PCS | Mod: ,,, | Performed by: EMERGENCY MEDICINE

## 2021-04-14 PROCEDURE — 25500020 PHARM REV CODE 255: Performed by: EMERGENCY MEDICINE

## 2021-04-14 PROCEDURE — U0002 COVID-19 LAB TEST NON-CDC: HCPCS | Performed by: EMERGENCY MEDICINE

## 2021-04-14 PROCEDURE — 25000003 PHARM REV CODE 250: Performed by: PHYSICIAN ASSISTANT

## 2021-04-14 PROCEDURE — 99291 CRITICAL CARE FIRST HOUR: CPT | Mod: 25

## 2021-04-14 PROCEDURE — 87040 BLOOD CULTURE FOR BACTERIA: CPT | Mod: 59 | Performed by: STUDENT IN AN ORGANIZED HEALTH CARE EDUCATION/TRAINING PROGRAM

## 2021-04-14 PROCEDURE — 86140 C-REACTIVE PROTEIN: CPT | Performed by: STUDENT IN AN ORGANIZED HEALTH CARE EDUCATION/TRAINING PROGRAM

## 2021-04-14 PROCEDURE — 87147 CULTURE TYPE IMMUNOLOGIC: CPT | Performed by: STUDENT IN AN ORGANIZED HEALTH CARE EDUCATION/TRAINING PROGRAM

## 2021-04-14 PROCEDURE — 11000001 HC ACUTE MED/SURG PRIVATE ROOM

## 2021-04-14 PROCEDURE — 84145 PROCALCITONIN (PCT): CPT | Performed by: PHYSICIAN ASSISTANT

## 2021-04-14 PROCEDURE — 99233 PR SUBSEQUENT HOSPITAL CARE,LEVL III: ICD-10-PCS | Mod: ,,, | Performed by: PHYSICIAN ASSISTANT

## 2021-04-14 PROCEDURE — 25000003 PHARM REV CODE 250: Performed by: EMERGENCY MEDICINE

## 2021-04-14 PROCEDURE — 63600175 PHARM REV CODE 636 W HCPCS: Performed by: EMERGENCY MEDICINE

## 2021-04-14 PROCEDURE — 96361 HYDRATE IV INFUSION ADD-ON: CPT

## 2021-04-14 PROCEDURE — 99223 1ST HOSP IP/OBS HIGH 75: CPT | Mod: ,,, | Performed by: STUDENT IN AN ORGANIZED HEALTH CARE EDUCATION/TRAINING PROGRAM

## 2021-04-14 PROCEDURE — A9585 GADOBUTROL INJECTION: HCPCS | Performed by: EMERGENCY MEDICINE

## 2021-04-14 PROCEDURE — 87186 SC STD MICRODIL/AGAR DIL: CPT | Performed by: STUDENT IN AN ORGANIZED HEALTH CARE EDUCATION/TRAINING PROGRAM

## 2021-04-14 RX ORDER — POLYETHYLENE GLYCOL 3350 17 G/17G
17 POWDER, FOR SOLUTION ORAL 2 TIMES DAILY
Status: DISCONTINUED | OUTPATIENT
Start: 2021-04-14 | End: 2021-04-27 | Stop reason: HOSPADM

## 2021-04-14 RX ORDER — IBUPROFEN 200 MG
24 TABLET ORAL
Status: DISCONTINUED | OUTPATIENT
Start: 2021-04-14 | End: 2021-04-27 | Stop reason: HOSPADM

## 2021-04-14 RX ORDER — OXYCODONE HYDROCHLORIDE 5 MG/1
10 TABLET ORAL EVERY 6 HOURS PRN
Status: DISCONTINUED | OUTPATIENT
Start: 2021-04-14 | End: 2021-04-16

## 2021-04-14 RX ORDER — MORPHINE SULFATE 2 MG/ML
6 INJECTION, SOLUTION INTRAMUSCULAR; INTRAVENOUS ONCE AS NEEDED
Status: DISCONTINUED | OUTPATIENT
Start: 2021-04-14 | End: 2021-04-14

## 2021-04-14 RX ORDER — IBUPROFEN 200 MG
16 TABLET ORAL
Status: DISCONTINUED | OUTPATIENT
Start: 2021-04-14 | End: 2021-04-27 | Stop reason: HOSPADM

## 2021-04-14 RX ORDER — GADOBUTROL 604.72 MG/ML
10 INJECTION INTRAVENOUS
Status: COMPLETED | OUTPATIENT
Start: 2021-04-14 | End: 2021-04-14

## 2021-04-14 RX ORDER — OXYCODONE HYDROCHLORIDE 5 MG/1
5 TABLET ORAL EVERY 6 HOURS PRN
Status: DISCONTINUED | OUTPATIENT
Start: 2021-04-14 | End: 2021-04-16

## 2021-04-14 RX ORDER — GLUCAGON 1 MG
1 KIT INJECTION
Status: DISCONTINUED | OUTPATIENT
Start: 2021-04-14 | End: 2021-04-27 | Stop reason: HOSPADM

## 2021-04-14 RX ORDER — CEFEPIME HYDROCHLORIDE 1 G/1
2 INJECTION, POWDER, FOR SOLUTION INTRAMUSCULAR; INTRAVENOUS
Status: DISCONTINUED | OUTPATIENT
Start: 2021-04-14 | End: 2021-04-16

## 2021-04-14 RX ORDER — HYDROCODONE BITARTRATE AND ACETAMINOPHEN 5; 325 MG/1; MG/1
1 TABLET ORAL EVERY 4 HOURS PRN
Status: DISCONTINUED | OUTPATIENT
Start: 2021-04-14 | End: 2021-04-15

## 2021-04-14 RX ORDER — NICOTINE 7MG/24HR
1 PATCH, TRANSDERMAL 24 HOURS TRANSDERMAL DAILY
Status: DISCONTINUED | OUTPATIENT
Start: 2021-04-15 | End: 2021-04-27 | Stop reason: HOSPADM

## 2021-04-14 RX ORDER — DOCUSATE SODIUM 100 MG/1
100 CAPSULE, LIQUID FILLED ORAL 2 TIMES DAILY
Status: DISCONTINUED | OUTPATIENT
Start: 2021-04-14 | End: 2021-04-15

## 2021-04-14 RX ORDER — SUCRALFATE 1 G/10ML
1 SUSPENSION ORAL EVERY 6 HOURS
Status: DISCONTINUED | OUTPATIENT
Start: 2021-04-14 | End: 2021-04-27 | Stop reason: HOSPADM

## 2021-04-14 RX ORDER — METHOCARBAMOL 500 MG/1
500 TABLET, FILM COATED ORAL 4 TIMES DAILY
Status: DISCONTINUED | OUTPATIENT
Start: 2021-04-14 | End: 2021-04-17

## 2021-04-14 RX ORDER — INSULIN ASPART 100 [IU]/ML
0-5 INJECTION, SOLUTION INTRAVENOUS; SUBCUTANEOUS
Status: DISCONTINUED | OUTPATIENT
Start: 2021-04-14 | End: 2021-04-26

## 2021-04-14 RX ORDER — MAG HYDROX/ALUMINUM HYD/SIMETH 200-200-20
30 SUSPENSION, ORAL (FINAL DOSE FORM) ORAL
Status: DISCONTINUED | OUTPATIENT
Start: 2021-04-14 | End: 2021-04-26

## 2021-04-14 RX ORDER — MORPHINE SULFATE 2 MG/ML
1 INJECTION, SOLUTION INTRAMUSCULAR; INTRAVENOUS
Status: DISCONTINUED | OUTPATIENT
Start: 2021-04-14 | End: 2021-04-16

## 2021-04-14 RX ORDER — MORPHINE SULFATE 4 MG/ML
4 INJECTION, SOLUTION INTRAMUSCULAR; INTRAVENOUS
Status: COMPLETED | OUTPATIENT
Start: 2021-04-14 | End: 2021-04-14

## 2021-04-14 RX ADMIN — POLYETHYLENE GLYCOL 3350 17 G: 17 POWDER, FOR SOLUTION ORAL at 08:04

## 2021-04-14 RX ADMIN — MORPHINE SULFATE 4 MG: 4 INJECTION, SOLUTION INTRAMUSCULAR; INTRAVENOUS at 06:04

## 2021-04-14 RX ADMIN — ALUMINUM HYDROXIDE, MAGNESIUM HYDROXIDE, SIMETHICONE 30 ML: 400; 400; 40 SUSPENSION ORAL at 04:04

## 2021-04-14 RX ADMIN — OXYCODONE HYDROCHLORIDE 10 MG: 10 TABLET ORAL at 08:04

## 2021-04-14 RX ADMIN — OXYCODONE HYDROCHLORIDE 10 MG: 10 TABLET ORAL at 02:04

## 2021-04-14 RX ADMIN — METHOCARBAMOL 500 MG: 500 TABLET ORAL at 02:04

## 2021-04-14 RX ADMIN — VANCOMYCIN HYDROCHLORIDE 2000 MG: 5 INJECTION, POWDER, LYOPHILIZED, FOR SOLUTION INTRAVENOUS at 04:04

## 2021-04-14 RX ADMIN — DOCUSATE SODIUM 100 MG: 100 CAPSULE, LIQUID FILLED ORAL at 08:04

## 2021-04-14 RX ADMIN — GADOBUTROL 10 ML: 604.72 INJECTION INTRAVENOUS at 09:04

## 2021-04-14 RX ADMIN — METHOCARBAMOL 500 MG: 500 TABLET ORAL at 08:04

## 2021-04-14 RX ADMIN — CEFEPIME 2 G: 1 INJECTION, POWDER, FOR SOLUTION INTRAMUSCULAR; INTRAVENOUS at 02:04

## 2021-04-14 RX ADMIN — DOCUSATE SODIUM 100 MG: 100 CAPSULE, LIQUID FILLED ORAL at 02:04

## 2021-04-14 RX ADMIN — ALUMINUM HYDROXIDE, MAGNESIUM HYDROXIDE, SIMETHICONE 30 ML: 400; 400; 40 SUSPENSION ORAL at 08:04

## 2021-04-14 RX ADMIN — SUCRALFATE 1 G: 1 SUSPENSION ORAL at 12:04

## 2021-04-14 RX ADMIN — SODIUM CHLORIDE 1000 ML: 0.9 INJECTION, SOLUTION INTRAVENOUS at 12:04

## 2021-04-14 RX ADMIN — CEFEPIME 2 G: 1 INJECTION, POWDER, FOR SOLUTION INTRAMUSCULAR; INTRAVENOUS at 08:04

## 2021-04-15 ENCOUNTER — ANESTHESIA EVENT (OUTPATIENT)
Dept: SURGERY | Facility: HOSPITAL | Age: 41
DRG: 029 | End: 2021-04-15
Payer: MEDICAID

## 2021-04-15 PROBLEM — F15.20 AMPHETAMINE USE DISORDER, SEVERE: Chronic | Status: ACTIVE | Noted: 2021-04-15

## 2021-04-15 PROBLEM — F12.20 CANNABIS USE DISORDER, SEVERE, DEPENDENCE: Chronic | Status: ACTIVE | Noted: 2021-04-15

## 2021-04-15 PROBLEM — R78.81 GRAM-POSITIVE COCCI BACTEREMIA: Status: ACTIVE | Noted: 2021-04-15

## 2021-04-15 PROBLEM — F17.200 TOBACCO USE DISORDER: Chronic | Status: ACTIVE | Noted: 2021-04-14

## 2021-04-15 LAB
ABO + RH BLD: NORMAL
ALBUMIN SERPL BCP-MCNC: 2.3 G/DL (ref 3.5–5.2)
ALP SERPL-CCNC: 102 U/L (ref 55–135)
ALT SERPL W/O P-5'-P-CCNC: 24 U/L (ref 10–44)
ANION GAP SERPL CALC-SCNC: 3 MMOL/L (ref 8–16)
APTT BLDCRRT: 26.5 SEC (ref 21–32)
ASCENDING AORTA: 3.13 CM
AST SERPL-CCNC: 35 U/L (ref 10–40)
AV INDEX (PROSTH): 0.86
AV MEAN GRADIENT: 6 MMHG
AV PEAK GRADIENT: 11 MMHG
AV VALVE AREA: 3.59 CM2
AV VELOCITY RATIO: 0.86
BASOPHILS # BLD AUTO: 0.01 K/UL (ref 0–0.2)
BASOPHILS NFR BLD: 0.3 % (ref 0–1.9)
BILIRUB SERPL-MCNC: 1.1 MG/DL (ref 0.1–1)
BLD GP AB SCN CELLS X3 SERPL QL: NORMAL
BLD PROD TYP BPU: NORMAL
BLD PROD TYP BPU: NORMAL
BLOOD UNIT EXPIRATION DATE: NORMAL
BLOOD UNIT EXPIRATION DATE: NORMAL
BLOOD UNIT TYPE CODE: 600
BLOOD UNIT TYPE CODE: 7300
BLOOD UNIT TYPE: NORMAL
BLOOD UNIT TYPE: NORMAL
BSA FOR ECHO PROCEDURE: 2.52 M2
BUN SERPL-MCNC: 14 MG/DL (ref 6–20)
CALCIUM SERPL-MCNC: 7.8 MG/DL (ref 8.7–10.5)
CHLORIDE SERPL-SCNC: 107 MMOL/L (ref 95–110)
CO2 SERPL-SCNC: 28 MMOL/L (ref 23–29)
CODING SYSTEM: NORMAL
CODING SYSTEM: NORMAL
CREAT SERPL-MCNC: 0.6 MG/DL (ref 0.5–1.4)
CV ECHO LV RWT: 0.44 CM
DIFFERENTIAL METHOD: ABNORMAL
DISPENSE STATUS: NORMAL
DISPENSE STATUS: NORMAL
DOP CALC AO PEAK VEL: 1.68 M/S
DOP CALC AO VTI: 32.03 CM
DOP CALC LVOT AREA: 4.2 CM2
DOP CALC LVOT DIAMETER: 2.31 CM
DOP CALC LVOT PEAK VEL: 1.45 M/S
DOP CALC LVOT STROKE VOLUME: 114.98 CM3
DOP CALCLVOT PEAK VEL VTI: 27.45 CM
E WAVE DECELERATION TIME: 163.14 MSEC
E/A RATIO: 1.08
E/E' RATIO: 7.68 M/S
ECHO LV POSTERIOR WALL: 1.2 CM (ref 0.6–1.1)
EJECTION FRACTION: 60 %
EOSINOPHIL # BLD AUTO: 0 K/UL (ref 0–0.5)
EOSINOPHIL NFR BLD: 0.6 % (ref 0–8)
ERYTHROCYTE [DISTWIDTH] IN BLOOD BY AUTOMATED COUNT: 15.2 % (ref 11.5–14.5)
EST. GFR  (AFRICAN AMERICAN): >60 ML/MIN/1.73 M^2
EST. GFR  (NON AFRICAN AMERICAN): >60 ML/MIN/1.73 M^2
FRACTIONAL SHORTENING: 32 % (ref 28–44)
GLUCOSE SERPL-MCNC: 108 MG/DL (ref 70–110)
HCT VFR BLD AUTO: 33 % (ref 37–48.5)
HGB BLD-MCNC: 10.6 G/DL (ref 12–16)
IMM GRANULOCYTES # BLD AUTO: 0.03 K/UL (ref 0–0.04)
IMM GRANULOCYTES NFR BLD AUTO: 0.8 % (ref 0–0.5)
INR PPP: 1.1 (ref 0.8–1.2)
INTERVENTRICULAR SEPTUM: 1.08 CM (ref 0.6–1.1)
LA MAJOR: 5.54 CM
LA MINOR: 5.32 CM
LA WIDTH: 5.39 CM
LEFT ATRIUM SIZE: 4.57 CM
LEFT ATRIUM VOLUME INDEX: 47.5 ML/M2
LEFT ATRIUM VOLUME: 113.64 CM3
LEFT INTERNAL DIMENSION IN SYSTOLE: 3.7 CM (ref 2.1–4)
LEFT VENTRICLE DIASTOLIC VOLUME INDEX: 59.46 ML/M2
LEFT VENTRICLE DIASTOLIC VOLUME: 142.12 ML
LEFT VENTRICLE MASS INDEX: 103 G/M2
LEFT VENTRICLE SYSTOLIC VOLUME INDEX: 24.3 ML/M2
LEFT VENTRICLE SYSTOLIC VOLUME: 58.1 ML
LEFT VENTRICULAR INTERNAL DIMENSION IN DIASTOLE: 5.41 CM (ref 3.5–6)
LEFT VENTRICULAR MASS: 247.22 G
LV LATERAL E/E' RATIO: 8 M/S
LV SEPTAL E/E' RATIO: 7.38 M/S
LYMPHOCYTES # BLD AUTO: 0.4 K/UL (ref 1–4.8)
LYMPHOCYTES NFR BLD: 12.4 % (ref 18–48)
MAGNESIUM SERPL-MCNC: 1.8 MG/DL (ref 1.6–2.6)
MCH RBC QN AUTO: 29.2 PG (ref 27–31)
MCHC RBC AUTO-ENTMCNC: 32.1 G/DL (ref 32–36)
MCV RBC AUTO: 91 FL (ref 82–98)
MONOCYTES # BLD AUTO: 0.3 K/UL (ref 0.3–1)
MONOCYTES NFR BLD: 7.3 % (ref 4–15)
MV PEAK A VEL: 0.89 M/S
MV PEAK E VEL: 0.96 M/S
MV STENOSIS PRESSURE HALF TIME: 47.31 MS
MV VALVE AREA P 1/2 METHOD: 4.65 CM2
NEUTROPHILS # BLD AUTO: 2.8 K/UL (ref 1.8–7.7)
NEUTROPHILS NFR BLD: 78.6 % (ref 38–73)
NRBC BLD-RTO: 0 /100 WBC
PHOSPHATE SERPL-MCNC: 2.1 MG/DL (ref 2.7–4.5)
PISA TR MAX VEL: 2.91 M/S
PLATELET # BLD AUTO: 65 K/UL (ref 150–450)
PMV BLD AUTO: 9.5 FL (ref 9.2–12.9)
POCT GLUCOSE: 138 MG/DL (ref 70–110)
POTASSIUM SERPL-SCNC: 4 MMOL/L (ref 3.5–5.1)
PROT SERPL-MCNC: 7.1 G/DL (ref 6–8.4)
PROTHROMBIN TIME: 12.2 SEC (ref 9–12.5)
RA MAJOR: 5.8 CM
RA PRESSURE: 3 MMHG
RA WIDTH: 4.53 CM
RBC # BLD AUTO: 3.63 M/UL (ref 4–5.4)
RIGHT VENTRICULAR END-DIASTOLIC DIMENSION: 3.71 CM
RV TISSUE DOPPLER FREE WALL SYSTOLIC VELOCITY 1 (APICAL 4 CHAMBER VIEW): 13.51 CM/S
SINUS: 2.97 CM
SODIUM SERPL-SCNC: 138 MMOL/L (ref 136–145)
STJ: 3.03 CM
TDI LATERAL: 0.12 M/S
TDI SEPTAL: 0.13 M/S
TDI: 0.13 M/S
TR MAX PG: 34 MMHG
TRANS PLATPHERESIS VOL PATIENT: NORMAL ML
TRANS PLATPHERESIS VOL PATIENT: NORMAL ML
TRICUSPID ANNULAR PLANE SYSTOLIC EXCURSION: 3.08 CM
TV REST PULMONARY ARTERY PRESSURE: 37 MMHG
WBC # BLD AUTO: 3.54 K/UL (ref 3.9–12.7)

## 2021-04-15 PROCEDURE — 25000003 PHARM REV CODE 250: Performed by: PHYSICIAN ASSISTANT

## 2021-04-15 PROCEDURE — 99900035 HC TECH TIME PER 15 MIN (STAT)

## 2021-04-15 PROCEDURE — 87040 BLOOD CULTURE FOR BACTERIA: CPT | Mod: 59 | Performed by: STUDENT IN AN ORGANIZED HEALTH CARE EDUCATION/TRAINING PROGRAM

## 2021-04-15 PROCEDURE — 99233 SBSQ HOSP IP/OBS HIGH 50: CPT | Mod: ,,, | Performed by: PHYSICIAN ASSISTANT

## 2021-04-15 PROCEDURE — 99233 PR SUBSEQUENT HOSPITAL CARE,LEVL III: ICD-10-PCS | Mod: ,,, | Performed by: STUDENT IN AN ORGANIZED HEALTH CARE EDUCATION/TRAINING PROGRAM

## 2021-04-15 PROCEDURE — 87040 BLOOD CULTURE FOR BACTERIA: CPT | Performed by: STUDENT IN AN ORGANIZED HEALTH CARE EDUCATION/TRAINING PROGRAM

## 2021-04-15 PROCEDURE — 85730 THROMBOPLASTIN TIME PARTIAL: CPT | Performed by: STUDENT IN AN ORGANIZED HEALTH CARE EDUCATION/TRAINING PROGRAM

## 2021-04-15 PROCEDURE — 99223 PR INITIAL HOSPITAL CARE,LEVL III: ICD-10-PCS | Mod: AF,HB,, | Performed by: PSYCHIATRY & NEUROLOGY

## 2021-04-15 PROCEDURE — 25000003 PHARM REV CODE 250: Performed by: STUDENT IN AN ORGANIZED HEALTH CARE EDUCATION/TRAINING PROGRAM

## 2021-04-15 PROCEDURE — 80321 ALCOHOLS BIOMARKERS 1OR 2: CPT | Performed by: PSYCHIATRY & NEUROLOGY

## 2021-04-15 PROCEDURE — 86900 BLOOD TYPING SEROLOGIC ABO: CPT | Performed by: STUDENT IN AN ORGANIZED HEALTH CARE EDUCATION/TRAINING PROGRAM

## 2021-04-15 PROCEDURE — 63600175 PHARM REV CODE 636 W HCPCS: Performed by: STUDENT IN AN ORGANIZED HEALTH CARE EDUCATION/TRAINING PROGRAM

## 2021-04-15 PROCEDURE — P9035 PLATELET PHERES LEUKOREDUCED: HCPCS | Performed by: STUDENT IN AN ORGANIZED HEALTH CARE EDUCATION/TRAINING PROGRAM

## 2021-04-15 PROCEDURE — 80053 COMPREHEN METABOLIC PANEL: CPT | Performed by: PHYSICIAN ASSISTANT

## 2021-04-15 PROCEDURE — 36415 COLL VENOUS BLD VENIPUNCTURE: CPT | Performed by: PSYCHIATRY & NEUROLOGY

## 2021-04-15 PROCEDURE — 85025 COMPLETE CBC W/AUTO DIFF WBC: CPT | Performed by: PHYSICIAN ASSISTANT

## 2021-04-15 PROCEDURE — 97530 THERAPEUTIC ACTIVITIES: CPT

## 2021-04-15 PROCEDURE — 99223 1ST HOSP IP/OBS HIGH 75: CPT | Mod: AF,HB,, | Performed by: PSYCHIATRY & NEUROLOGY

## 2021-04-15 PROCEDURE — 36415 COLL VENOUS BLD VENIPUNCTURE: CPT | Performed by: STUDENT IN AN ORGANIZED HEALTH CARE EDUCATION/TRAINING PROGRAM

## 2021-04-15 PROCEDURE — S4991 NICOTINE PATCH NONLEGEND: HCPCS | Performed by: STUDENT IN AN ORGANIZED HEALTH CARE EDUCATION/TRAINING PROGRAM

## 2021-04-15 PROCEDURE — 99233 PR SUBSEQUENT HOSPITAL CARE,LEVL III: ICD-10-PCS | Mod: ,,, | Performed by: PHYSICIAN ASSISTANT

## 2021-04-15 PROCEDURE — 97162 PT EVAL MOD COMPLEX 30 MIN: CPT

## 2021-04-15 PROCEDURE — 99233 SBSQ HOSP IP/OBS HIGH 50: CPT | Mod: ,,, | Performed by: NURSE PRACTITIONER

## 2021-04-15 PROCEDURE — 84100 ASSAY OF PHOSPHORUS: CPT | Performed by: STUDENT IN AN ORGANIZED HEALTH CARE EDUCATION/TRAINING PROGRAM

## 2021-04-15 PROCEDURE — P9035 PLATELET PHERES LEUKOREDUCED: HCPCS | Performed by: PHYSICIAN ASSISTANT

## 2021-04-15 PROCEDURE — 11000001 HC ACUTE MED/SURG PRIVATE ROOM

## 2021-04-15 PROCEDURE — 63600175 PHARM REV CODE 636 W HCPCS: Performed by: PHYSICIAN ASSISTANT

## 2021-04-15 PROCEDURE — 97165 OT EVAL LOW COMPLEX 30 MIN: CPT

## 2021-04-15 PROCEDURE — 36430 TRANSFUSION BLD/BLD COMPNT: CPT

## 2021-04-15 PROCEDURE — 87522 HEPATITIS C REVRS TRNSCRPJ: CPT | Performed by: STUDENT IN AN ORGANIZED HEALTH CARE EDUCATION/TRAINING PROGRAM

## 2021-04-15 PROCEDURE — 99233 PR SUBSEQUENT HOSPITAL CARE,LEVL III: ICD-10-PCS | Mod: ,,, | Performed by: NURSE PRACTITIONER

## 2021-04-15 PROCEDURE — 94761 N-INVAS EAR/PLS OXIMETRY MLT: CPT

## 2021-04-15 PROCEDURE — 85610 PROTHROMBIN TIME: CPT | Performed by: STUDENT IN AN ORGANIZED HEALTH CARE EDUCATION/TRAINING PROGRAM

## 2021-04-15 PROCEDURE — 83735 ASSAY OF MAGNESIUM: CPT | Performed by: STUDENT IN AN ORGANIZED HEALTH CARE EDUCATION/TRAINING PROGRAM

## 2021-04-15 PROCEDURE — 99233 SBSQ HOSP IP/OBS HIGH 50: CPT | Mod: ,,, | Performed by: STUDENT IN AN ORGANIZED HEALTH CARE EDUCATION/TRAINING PROGRAM

## 2021-04-15 RX ORDER — SODIUM CHLORIDE 9 MG/ML
INJECTION, SOLUTION INTRAVENOUS CONTINUOUS
Status: DISCONTINUED | OUTPATIENT
Start: 2021-04-15 | End: 2021-04-16

## 2021-04-15 RX ORDER — HYDROCODONE BITARTRATE AND ACETAMINOPHEN 500; 5 MG/1; MG/1
TABLET ORAL
Status: DISCONTINUED | OUTPATIENT
Start: 2021-04-15 | End: 2021-04-16

## 2021-04-15 RX ORDER — HYDROMORPHONE HYDROCHLORIDE 1 MG/ML
1 INJECTION, SOLUTION INTRAMUSCULAR; INTRAVENOUS; SUBCUTANEOUS EVERY 6 HOURS PRN
Status: DISCONTINUED | OUTPATIENT
Start: 2021-04-15 | End: 2021-04-16

## 2021-04-15 RX ORDER — AMOXICILLIN 250 MG
1 CAPSULE ORAL DAILY
Status: DISCONTINUED | OUTPATIENT
Start: 2021-04-15 | End: 2021-04-27 | Stop reason: HOSPADM

## 2021-04-15 RX ADMIN — POLYETHYLENE GLYCOL 3350 17 G: 17 POWDER, FOR SOLUTION ORAL at 09:04

## 2021-04-15 RX ADMIN — METHOCARBAMOL 500 MG: 500 TABLET ORAL at 09:04

## 2021-04-15 RX ADMIN — SODIUM CHLORIDE: 0.9 INJECTION, SOLUTION INTRAVENOUS at 05:04

## 2021-04-15 RX ADMIN — DOCUSATE SODIUM 100 MG: 100 CAPSULE, LIQUID FILLED ORAL at 08:04

## 2021-04-15 RX ADMIN — SUCRALFATE 1 G: 1 SUSPENSION ORAL at 11:04

## 2021-04-15 RX ADMIN — HYDROMORPHONE HYDROCHLORIDE 1 MG: 1 INJECTION, SOLUTION INTRAMUSCULAR; INTRAVENOUS; SUBCUTANEOUS at 10:04

## 2021-04-15 RX ADMIN — HYDROMORPHONE HYDROCHLORIDE 1 MG: 1 INJECTION, SOLUTION INTRAMUSCULAR; INTRAVENOUS; SUBCUTANEOUS at 07:04

## 2021-04-15 RX ADMIN — ALUMINUM HYDROXIDE, MAGNESIUM HYDROXIDE, SIMETHICONE 30 ML: 400; 400; 40 SUSPENSION ORAL at 10:04

## 2021-04-15 RX ADMIN — OXYCODONE HYDROCHLORIDE 10 MG: 10 TABLET ORAL at 05:04

## 2021-04-15 RX ADMIN — MORPHINE SULFATE 1 MG: 2 INJECTION, SOLUTION INTRAMUSCULAR; INTRAVENOUS at 08:04

## 2021-04-15 RX ADMIN — ALUMINUM HYDROXIDE, MAGNESIUM HYDROXIDE, SIMETHICONE 30 ML: 400; 400; 40 SUSPENSION ORAL at 04:04

## 2021-04-15 RX ADMIN — POLYETHYLENE GLYCOL 3350 17 G: 17 POWDER, FOR SOLUTION ORAL at 08:04

## 2021-04-15 RX ADMIN — MORPHINE SULFATE 1 MG: 2 INJECTION, SOLUTION INTRAMUSCULAR; INTRAVENOUS at 11:04

## 2021-04-15 RX ADMIN — ALUMINUM HYDROXIDE, MAGNESIUM HYDROXIDE, SIMETHICONE 30 ML: 400; 400; 40 SUSPENSION ORAL at 09:04

## 2021-04-15 RX ADMIN — SUCRALFATE 1 G: 1 SUSPENSION ORAL at 05:04

## 2021-04-15 RX ADMIN — METHOCARBAMOL 500 MG: 500 TABLET ORAL at 01:04

## 2021-04-15 RX ADMIN — SUCRALFATE 1 G: 1 SUSPENSION ORAL at 12:04

## 2021-04-15 RX ADMIN — OXYCODONE HYDROCHLORIDE 10 MG: 10 TABLET ORAL at 01:04

## 2021-04-15 RX ADMIN — METHOCARBAMOL 500 MG: 500 TABLET ORAL at 08:04

## 2021-04-15 RX ADMIN — ALUMINUM HYDROXIDE, MAGNESIUM HYDROXIDE, SIMETHICONE 30 ML: 400; 400; 40 SUSPENSION ORAL at 05:04

## 2021-04-15 RX ADMIN — METHOCARBAMOL 500 MG: 500 TABLET ORAL at 04:04

## 2021-04-15 RX ADMIN — MORPHINE SULFATE 1 MG: 2 INJECTION, SOLUTION INTRAMUSCULAR; INTRAVENOUS at 12:04

## 2021-04-15 RX ADMIN — CEFEPIME 2 G: 1 INJECTION, POWDER, FOR SOLUTION INTRAMUSCULAR; INTRAVENOUS at 01:04

## 2021-04-15 RX ADMIN — DOCUSATE SODIUM 50MG AND SENNOSIDES 8.6MG 1 TABLET: 8.6; 5 TABLET, FILM COATED ORAL at 10:04

## 2021-04-15 RX ADMIN — CEFEPIME 2 G: 1 INJECTION, POWDER, FOR SOLUTION INTRAMUSCULAR; INTRAVENOUS at 05:04

## 2021-04-15 RX ADMIN — OXYCODONE HYDROCHLORIDE 10 MG: 10 TABLET ORAL at 10:04

## 2021-04-15 RX ADMIN — NICOTINE 1 PATCH: 7 PATCH TRANSDERMAL at 08:04

## 2021-04-15 RX ADMIN — CEFEPIME 2 G: 1 INJECTION, POWDER, FOR SOLUTION INTRAMUSCULAR; INTRAVENOUS at 10:04

## 2021-04-15 RX ADMIN — VANCOMYCIN HYDROCHLORIDE 1750 MG: 5 INJECTION, POWDER, LYOPHILIZED, FOR SOLUTION INTRAVENOUS at 05:04

## 2021-04-16 ENCOUNTER — ANESTHESIA (OUTPATIENT)
Dept: SURGERY | Facility: HOSPITAL | Age: 41
DRG: 029 | End: 2021-04-16
Payer: MEDICAID

## 2021-04-16 PROBLEM — Z01.818 PREOPERATIVE EXAMINATION: Status: RESOLVED | Noted: 2021-04-14 | Resolved: 2021-04-16

## 2021-04-16 LAB
ALBUMIN SERPL BCP-MCNC: 2.4 G/DL (ref 3.5–5.2)
ALP SERPL-CCNC: 104 U/L (ref 55–135)
ALT SERPL W/O P-5'-P-CCNC: 27 U/L (ref 10–44)
ANION GAP SERPL CALC-SCNC: 5 MMOL/L (ref 8–16)
AST SERPL-CCNC: 43 U/L (ref 10–40)
BASOPHILS # BLD AUTO: 0.01 K/UL (ref 0–0.2)
BASOPHILS NFR BLD: 0.2 % (ref 0–1.9)
BILIRUB SERPL-MCNC: 1.4 MG/DL (ref 0.1–1)
BLD PROD TYP BPU: NORMAL
BLOOD UNIT EXPIRATION DATE: NORMAL
BLOOD UNIT TYPE CODE: 6200
BLOOD UNIT TYPE: NORMAL
BUN SERPL-MCNC: 11 MG/DL (ref 6–20)
CALCIUM SERPL-MCNC: 7.7 MG/DL (ref 8.7–10.5)
CHLORIDE SERPL-SCNC: 104 MMOL/L (ref 95–110)
CO2 SERPL-SCNC: 26 MMOL/L (ref 23–29)
CODING SYSTEM: NORMAL
CREAT SERPL-MCNC: 0.7 MG/DL (ref 0.5–1.4)
DIFFERENTIAL METHOD: ABNORMAL
DISPENSE STATUS: NORMAL
EOSINOPHIL # BLD AUTO: 0 K/UL (ref 0–0.5)
EOSINOPHIL NFR BLD: 1 % (ref 0–8)
ERYTHROCYTE [DISTWIDTH] IN BLOOD BY AUTOMATED COUNT: 15.5 % (ref 11.5–14.5)
EST. GFR  (AFRICAN AMERICAN): >60 ML/MIN/1.73 M^2
EST. GFR  (NON AFRICAN AMERICAN): >60 ML/MIN/1.73 M^2
ESTIMATED AVG GLUCOSE: 85 MG/DL (ref 68–131)
GLUCOSE SERPL-MCNC: 100 MG/DL (ref 70–110)
GRAM STN SPEC: NORMAL
HBA1C MFR BLD: 4.6 % (ref 4–5.6)
HCT VFR BLD AUTO: 34.2 % (ref 37–48.5)
HEPATITIS C VIRUS (HCV) RNA DETECTION/QUANTIFICATION RT-PCR: ABNORMAL IU/ML
HGB BLD-MCNC: 11.1 G/DL (ref 12–16)
IMM GRANULOCYTES # BLD AUTO: 0.05 K/UL (ref 0–0.04)
IMM GRANULOCYTES NFR BLD AUTO: 1.2 % (ref 0–0.5)
LYMPHOCYTES # BLD AUTO: 0.5 K/UL (ref 1–4.8)
LYMPHOCYTES NFR BLD: 12.4 % (ref 18–48)
MAGNESIUM SERPL-MCNC: 1.9 MG/DL (ref 1.6–2.6)
MCH RBC QN AUTO: 29.3 PG (ref 27–31)
MCHC RBC AUTO-ENTMCNC: 32.5 G/DL (ref 32–36)
MCV RBC AUTO: 90 FL (ref 82–98)
MONOCYTES # BLD AUTO: 0.4 K/UL (ref 0.3–1)
MONOCYTES NFR BLD: 9.8 % (ref 4–15)
NEUTROPHILS # BLD AUTO: 3.1 K/UL (ref 1.8–7.7)
NEUTROPHILS NFR BLD: 75.4 % (ref 38–73)
NRBC BLD-RTO: 0 /100 WBC
PHOSPHATE SERPL-MCNC: 2.2 MG/DL (ref 2.7–4.5)
PLATELET # BLD AUTO: 79 K/UL (ref 150–450)
PMV BLD AUTO: 8.9 FL (ref 9.2–12.9)
POCT GLUCOSE: 283 MG/DL (ref 70–110)
POCT GLUCOSE: 95 MG/DL (ref 70–110)
POTASSIUM SERPL-SCNC: 4.3 MMOL/L (ref 3.5–5.1)
PROT SERPL-MCNC: 7.2 G/DL (ref 6–8.4)
RBC # BLD AUTO: 3.79 M/UL (ref 4–5.4)
SODIUM SERPL-SCNC: 135 MMOL/L (ref 136–145)
TRANS PLATPHERESIS VOL PATIENT: NORMAL ML
TSH SERPL DL<=0.005 MIU/L-ACNC: 1.25 UIU/ML (ref 0.4–4)
VANCOMYCIN TROUGH SERPL-MCNC: 3.8 UG/ML (ref 10–22)
WBC # BLD AUTO: 4.1 K/UL (ref 3.9–12.7)

## 2021-04-16 PROCEDURE — 99233 PR SUBSEQUENT HOSPITAL CARE,LEVL III: ICD-10-PCS | Mod: ,,, | Performed by: STUDENT IN AN ORGANIZED HEALTH CARE EDUCATION/TRAINING PROGRAM

## 2021-04-16 PROCEDURE — 71000039 HC RECOVERY, EACH ADD'L HOUR: Performed by: NEUROLOGICAL SURGERY

## 2021-04-16 PROCEDURE — 22633 PR ARTHRODESIS, COMBINED TECHN, SNGL INTERSPACE, LUMBAR: ICD-10-PCS | Mod: 22,,, | Performed by: NEUROLOGICAL SURGERY

## 2021-04-16 PROCEDURE — 94761 N-INVAS EAR/PLS OXIMETRY MLT: CPT

## 2021-04-16 PROCEDURE — 85025 COMPLETE CBC W/AUTO DIFF WBC: CPT | Performed by: STUDENT IN AN ORGANIZED HEALTH CARE EDUCATION/TRAINING PROGRAM

## 2021-04-16 PROCEDURE — 25000003 PHARM REV CODE 250: Performed by: STUDENT IN AN ORGANIZED HEALTH CARE EDUCATION/TRAINING PROGRAM

## 2021-04-16 PROCEDURE — 71000033 HC RECOVERY, INTIAL HOUR: Performed by: NEUROLOGICAL SURGERY

## 2021-04-16 PROCEDURE — 36415 COLL VENOUS BLD VENIPUNCTURE: CPT | Performed by: STUDENT IN AN ORGANIZED HEALTH CARE EDUCATION/TRAINING PROGRAM

## 2021-04-16 PROCEDURE — 82962 GLUCOSE BLOOD TEST: CPT | Performed by: NEUROLOGICAL SURGERY

## 2021-04-16 PROCEDURE — 87206 SMEAR FLUORESCENT/ACID STAI: CPT | Performed by: NEUROLOGICAL SURGERY

## 2021-04-16 PROCEDURE — 27800903 OPTIME MED/SURG SUP & DEVICES OTHER IMPLANTS: Performed by: NEUROLOGICAL SURGERY

## 2021-04-16 PROCEDURE — 63600175 PHARM REV CODE 636 W HCPCS: Performed by: STUDENT IN AN ORGANIZED HEALTH CARE EDUCATION/TRAINING PROGRAM

## 2021-04-16 PROCEDURE — 63600175 PHARM REV CODE 636 W HCPCS: Performed by: PHYSICIAN ASSISTANT

## 2021-04-16 PROCEDURE — 87075 CULTR BACTERIA EXCEPT BLOOD: CPT | Mod: 59 | Performed by: NEUROLOGICAL SURGERY

## 2021-04-16 PROCEDURE — 63600175 PHARM REV CODE 636 W HCPCS: Performed by: NEUROLOGICAL SURGERY

## 2021-04-16 PROCEDURE — 36556 PR INSERT NON-TUNNEL CV CATH 5+ YRS OLD: ICD-10-PCS | Mod: 59,,, | Performed by: ANESTHESIOLOGY

## 2021-04-16 PROCEDURE — 76937 US GUIDE VASCULAR ACCESS: CPT | Mod: 26,,, | Performed by: ANESTHESIOLOGY

## 2021-04-16 PROCEDURE — 36000711: Performed by: NEUROLOGICAL SURGERY

## 2021-04-16 PROCEDURE — C1713 ANCHOR/SCREW BN/BN,TIS/BN: HCPCS | Performed by: NEUROLOGICAL SURGERY

## 2021-04-16 PROCEDURE — 87116 MYCOBACTERIA CULTURE: CPT | Performed by: NEUROLOGICAL SURGERY

## 2021-04-16 PROCEDURE — 87070 CULTURE OTHR SPECIMN AEROBIC: CPT | Mod: 59 | Performed by: NEUROLOGICAL SURGERY

## 2021-04-16 PROCEDURE — 99291 CRITICAL CARE FIRST HOUR: CPT | Mod: ,,, | Performed by: PSYCHIATRY & NEUROLOGY

## 2021-04-16 PROCEDURE — 83036 HEMOGLOBIN GLYCOSYLATED A1C: CPT | Performed by: STUDENT IN AN ORGANIZED HEALTH CARE EDUCATION/TRAINING PROGRAM

## 2021-04-16 PROCEDURE — 36556 INSERT NON-TUNNEL CV CATH: CPT | Mod: 59,,, | Performed by: ANESTHESIOLOGY

## 2021-04-16 PROCEDURE — 27000221 HC OXYGEN, UP TO 24 HOURS

## 2021-04-16 PROCEDURE — D9220A PRA ANESTHESIA: Mod: CRNA,,, | Performed by: STUDENT IN AN ORGANIZED HEALTH CARE EDUCATION/TRAINING PROGRAM

## 2021-04-16 PROCEDURE — 63267 EXCISE INTRSPINL LESION LMBR: CPT | Mod: 59,51,, | Performed by: NEUROLOGICAL SURGERY

## 2021-04-16 PROCEDURE — 12000002 HC ACUTE/MED SURGE SEMI-PRIVATE ROOM

## 2021-04-16 PROCEDURE — 87102 FUNGUS ISOLATION CULTURE: CPT | Mod: 59 | Performed by: NEUROLOGICAL SURGERY

## 2021-04-16 PROCEDURE — 99233 SBSQ HOSP IP/OBS HIGH 50: CPT | Mod: ,,, | Performed by: STUDENT IN AN ORGANIZED HEALTH CARE EDUCATION/TRAINING PROGRAM

## 2021-04-16 PROCEDURE — 83735 ASSAY OF MAGNESIUM: CPT | Performed by: STUDENT IN AN ORGANIZED HEALTH CARE EDUCATION/TRAINING PROGRAM

## 2021-04-16 PROCEDURE — 76937 PR  US GUIDE, VASCULAR ACCESS: ICD-10-PCS | Mod: 26,,, | Performed by: ANESTHESIOLOGY

## 2021-04-16 PROCEDURE — 87205 SMEAR GRAM STAIN: CPT | Mod: 59 | Performed by: NEUROLOGICAL SURGERY

## 2021-04-16 PROCEDURE — 25000003 PHARM REV CODE 250: Performed by: NURSE ANESTHETIST, CERTIFIED REGISTERED

## 2021-04-16 PROCEDURE — 22634 PR ARTHRODESIS, COMBINED TECHN, SNGL INTERSPACE, EA ADDTL: ICD-10-PCS | Mod: ,,, | Performed by: NEUROLOGICAL SURGERY

## 2021-04-16 PROCEDURE — 22842 INSERT SPINE FIXATION DEVICE: CPT | Mod: ,,, | Performed by: NEUROLOGICAL SURGERY

## 2021-04-16 PROCEDURE — 84443 ASSAY THYROID STIM HORMONE: CPT | Performed by: STUDENT IN AN ORGANIZED HEALTH CARE EDUCATION/TRAINING PROGRAM

## 2021-04-16 PROCEDURE — 37000008 HC ANESTHESIA 1ST 15 MINUTES: Performed by: NEUROLOGICAL SURGERY

## 2021-04-16 PROCEDURE — 25000003 PHARM REV CODE 250: Performed by: NEUROLOGICAL SURGERY

## 2021-04-16 PROCEDURE — 20930 SP BONE ALGRFT MORSEL ADD-ON: CPT | Mod: ,,, | Performed by: NEUROLOGICAL SURGERY

## 2021-04-16 PROCEDURE — D9220A PRA ANESTHESIA: ICD-10-PCS | Mod: ANES,,, | Performed by: ANESTHESIOLOGY

## 2021-04-16 PROCEDURE — 20930 PR ALLOGRAFT FOR SPINE SURGERY ONLY MORSELIZED: ICD-10-PCS | Mod: ,,, | Performed by: NEUROLOGICAL SURGERY

## 2021-04-16 PROCEDURE — 22634 ARTHRD CMBN 1NTRSPC EA ADDL: CPT | Mod: ,,, | Performed by: NEUROLOGICAL SURGERY

## 2021-04-16 PROCEDURE — 36620 PR INSERT CATH,ART,PERCUT,SHORTTERM: ICD-10-PCS | Mod: 59,,, | Performed by: ANESTHESIOLOGY

## 2021-04-16 PROCEDURE — 22842 PR POSTERIOR SEGMENTAL INSTRUMENTATION 3-6 VRT SEG: ICD-10-PCS | Mod: ,,, | Performed by: NEUROLOGICAL SURGERY

## 2021-04-16 PROCEDURE — S4991 NICOTINE PATCH NONLEGEND: HCPCS | Performed by: STUDENT IN AN ORGANIZED HEALTH CARE EDUCATION/TRAINING PROGRAM

## 2021-04-16 PROCEDURE — 25000003 PHARM REV CODE 250: Performed by: PHYSICIAN ASSISTANT

## 2021-04-16 PROCEDURE — 27201423 OPTIME MED/SURG SUP & DEVICES STERILE SUPPLY: Performed by: NEUROLOGICAL SURGERY

## 2021-04-16 PROCEDURE — P9035 PLATELET PHERES LEUKOREDUCED: HCPCS | Performed by: PHYSICIAN ASSISTANT

## 2021-04-16 PROCEDURE — 36620 INSERTION CATHETER ARTERY: CPT | Mod: 59,,, | Performed by: ANESTHESIOLOGY

## 2021-04-16 PROCEDURE — 22633 ARTHRD CMBN 1NTRSPC LUMBAR: CPT | Mod: 22,,, | Performed by: NEUROLOGICAL SURGERY

## 2021-04-16 PROCEDURE — 80053 COMPREHEN METABOLIC PANEL: CPT | Performed by: PHYSICIAN ASSISTANT

## 2021-04-16 PROCEDURE — C1729 CATH, DRAINAGE: HCPCS | Performed by: NEUROLOGICAL SURGERY

## 2021-04-16 PROCEDURE — 63600175 PHARM REV CODE 636 W HCPCS: Performed by: ANESTHESIOLOGY

## 2021-04-16 PROCEDURE — 99291 PR CRITICAL CARE, E/M 30-74 MINUTES: ICD-10-PCS | Mod: ,,, | Performed by: PSYCHIATRY & NEUROLOGY

## 2021-04-16 PROCEDURE — 36000710: Performed by: NEUROLOGICAL SURGERY

## 2021-04-16 PROCEDURE — D9220A PRA ANESTHESIA: Mod: ANES,,, | Performed by: ANESTHESIOLOGY

## 2021-04-16 PROCEDURE — 87176 TISSUE HOMOGENIZATION CULTR: CPT | Performed by: NEUROLOGICAL SURGERY

## 2021-04-16 PROCEDURE — 63267 PR EXCIS INTRASP LESN,XDURAL,LUMBAR: ICD-10-PCS | Mod: 59,51,, | Performed by: NEUROLOGICAL SURGERY

## 2021-04-16 PROCEDURE — 84100 ASSAY OF PHOSPHORUS: CPT | Performed by: STUDENT IN AN ORGANIZED HEALTH CARE EDUCATION/TRAINING PROGRAM

## 2021-04-16 PROCEDURE — 37000009 HC ANESTHESIA EA ADD 15 MINS: Performed by: NEUROLOGICAL SURGERY

## 2021-04-16 PROCEDURE — D9220A PRA ANESTHESIA: ICD-10-PCS | Mod: CRNA,,, | Performed by: STUDENT IN AN ORGANIZED HEALTH CARE EDUCATION/TRAINING PROGRAM

## 2021-04-16 PROCEDURE — 63600175 PHARM REV CODE 636 W HCPCS: Performed by: NURSE ANESTHETIST, CERTIFIED REGISTERED

## 2021-04-16 PROCEDURE — 80202 ASSAY OF VANCOMYCIN: CPT | Performed by: STUDENT IN AN ORGANIZED HEALTH CARE EDUCATION/TRAINING PROGRAM

## 2021-04-16 DEVICE — SCREW BONE SPINAL 5.5 6 X 45MM: Type: IMPLANTABLE DEVICE | Site: SPINE LUMBAR | Status: FUNCTIONAL

## 2021-04-16 DEVICE — KIT BONE GRFT BMP SM: Type: IMPLANTABLE DEVICE | Site: SPINE LUMBAR | Status: FUNCTIONAL

## 2021-04-16 DEVICE — IMPLANTABLE DEVICE: Type: IMPLANTABLE DEVICE | Site: SPINE LUMBAR | Status: FUNCTIONAL

## 2021-04-16 DEVICE — SCREW EXPEDIUM VERSE 5.5 6X45: Type: IMPLANTABLE DEVICE | Site: SPINE LUMBAR | Status: FUNCTIONAL

## 2021-04-16 DEVICE — ROD SPINAL EXPEDIUM 5.5X75MM: Type: IMPLANTABLE DEVICE | Site: SPINE LUMBAR | Status: FUNCTIONAL

## 2021-04-16 DEVICE — SCREW INNER SINGLE SET TITANIU: Type: IMPLANTABLE DEVICE | Site: SPINE LUMBAR | Status: FUNCTIONAL

## 2021-04-16 DEVICE — CAGE POST LUM LORDTC 12MM 9X26: Type: IMPLANTABLE DEVICE | Site: SPINE LUMBAR | Status: FUNCTIONAL

## 2021-04-16 DEVICE — SET SCREW EXPEDIUM VERSE UNITZ: Type: IMPLANTABLE DEVICE | Site: SPINE LUMBAR | Status: FUNCTIONAL

## 2021-04-16 DEVICE — BONE 30CC CANCELLOUS CRUSHED: Type: IMPLANTABLE DEVICE | Site: SPINE LUMBAR | Status: FUNCTIONAL

## 2021-04-16 RX ORDER — SODIUM CHLORIDE 9 MG/ML
INJECTION, SOLUTION INTRAVENOUS CONTINUOUS
Status: DISCONTINUED | OUTPATIENT
Start: 2021-04-16 | End: 2021-04-17

## 2021-04-16 RX ORDER — SODIUM,POTASSIUM PHOSPHATES 280-250MG
2 POWDER IN PACKET (EA) ORAL EVERY 4 HOURS
Status: DISPENSED | OUTPATIENT
Start: 2021-04-16 | End: 2021-04-16

## 2021-04-16 RX ORDER — OXYCODONE AND ACETAMINOPHEN 10; 325 MG/1; MG/1
1 TABLET ORAL EVERY 4 HOURS PRN
Status: DISCONTINUED | OUTPATIENT
Start: 2021-04-16 | End: 2021-04-17

## 2021-04-16 RX ORDER — HYDROCODONE BITARTRATE AND ACETAMINOPHEN 500; 5 MG/1; MG/1
TABLET ORAL
Status: DISCONTINUED | OUTPATIENT
Start: 2021-04-16 | End: 2021-04-17

## 2021-04-16 RX ORDER — PHENYLEPHRINE HYDROCHLORIDE 10 MG/ML
INJECTION INTRAVENOUS
Status: DISCONTINUED | OUTPATIENT
Start: 2021-04-16 | End: 2021-04-16

## 2021-04-16 RX ORDER — SODIUM CHLORIDE 0.9 % (FLUSH) 0.9 %
3 SYRINGE (ML) INJECTION
Status: DISCONTINUED | OUTPATIENT
Start: 2021-04-16 | End: 2021-04-16

## 2021-04-16 RX ORDER — HYDROMORPHONE HYDROCHLORIDE 1 MG/ML
0.5 INJECTION, SOLUTION INTRAMUSCULAR; INTRAVENOUS; SUBCUTANEOUS
Status: DISCONTINUED | OUTPATIENT
Start: 2021-04-16 | End: 2021-04-17

## 2021-04-16 RX ORDER — ACETAMINOPHEN 10 MG/ML
INJECTION, SOLUTION INTRAVENOUS
Status: DISCONTINUED | OUTPATIENT
Start: 2021-04-16 | End: 2021-04-16

## 2021-04-16 RX ORDER — LORAZEPAM 2 MG/ML
0.25 INJECTION INTRAMUSCULAR ONCE AS NEEDED
Status: DISCONTINUED | OUTPATIENT
Start: 2021-04-16 | End: 2021-04-16

## 2021-04-16 RX ORDER — CEFAZOLIN SODIUM 1 G/3ML
INJECTION, POWDER, FOR SOLUTION INTRAMUSCULAR; INTRAVENOUS
Status: DISCONTINUED | OUTPATIENT
Start: 2021-04-16 | End: 2021-04-16

## 2021-04-16 RX ORDER — ESMOLOL HYDROCHLORIDE 10 MG/ML
INJECTION INTRAVENOUS
Status: DISCONTINUED | OUTPATIENT
Start: 2021-04-16 | End: 2021-04-16

## 2021-04-16 RX ORDER — ROCURONIUM BROMIDE 10 MG/ML
INJECTION, SOLUTION INTRAVENOUS
Status: DISCONTINUED | OUTPATIENT
Start: 2021-04-16 | End: 2021-04-16

## 2021-04-16 RX ORDER — KETAMINE HCL IN 0.9 % NACL 50 MG/5 ML
SYRINGE (ML) INTRAVENOUS
Status: DISCONTINUED | OUTPATIENT
Start: 2021-04-16 | End: 2021-04-16

## 2021-04-16 RX ORDER — HYDROMORPHONE HYDROCHLORIDE 1 MG/ML
1 INJECTION, SOLUTION INTRAMUSCULAR; INTRAVENOUS; SUBCUTANEOUS ONCE
Status: COMPLETED | OUTPATIENT
Start: 2021-04-16 | End: 2021-04-16

## 2021-04-16 RX ORDER — LIDOCAINE HCL/PF 100 MG/5ML
SYRINGE (ML) INTRAVENOUS
Status: DISCONTINUED | OUTPATIENT
Start: 2021-04-16 | End: 2021-04-16

## 2021-04-16 RX ORDER — MUPIROCIN 20 MG/G
OINTMENT TOPICAL 2 TIMES DAILY
Status: COMPLETED | OUTPATIENT
Start: 2021-04-16 | End: 2021-04-21

## 2021-04-16 RX ORDER — NEOSTIGMINE METHYLSULFATE 0.5 MG/ML
INJECTION, SOLUTION INTRAVENOUS
Status: DISCONTINUED | OUTPATIENT
Start: 2021-04-16 | End: 2021-04-16

## 2021-04-16 RX ORDER — FENTANYL CITRATE 50 UG/ML
25 INJECTION, SOLUTION INTRAMUSCULAR; INTRAVENOUS EVERY 5 MIN PRN
Status: DISCONTINUED | OUTPATIENT
Start: 2021-04-16 | End: 2021-04-16

## 2021-04-16 RX ORDER — HYDROMORPHONE HYDROCHLORIDE 1 MG/ML
2 INJECTION, SOLUTION INTRAMUSCULAR; INTRAVENOUS; SUBCUTANEOUS EVERY 30 MIN PRN
Status: DISCONTINUED | OUTPATIENT
Start: 2021-04-16 | End: 2021-04-16

## 2021-04-16 RX ORDER — FENTANYL CITRATE 50 UG/ML
INJECTION, SOLUTION INTRAMUSCULAR; INTRAVENOUS
Status: DISCONTINUED | OUTPATIENT
Start: 2021-04-16 | End: 2021-04-16

## 2021-04-16 RX ORDER — HYDROMORPHONE HYDROCHLORIDE 2 MG/ML
INJECTION, SOLUTION INTRAMUSCULAR; INTRAVENOUS; SUBCUTANEOUS
Status: DISCONTINUED | OUTPATIENT
Start: 2021-04-16 | End: 2021-04-16

## 2021-04-16 RX ORDER — MIDAZOLAM HYDROCHLORIDE 1 MG/ML
INJECTION, SOLUTION INTRAMUSCULAR; INTRAVENOUS
Status: DISCONTINUED | OUTPATIENT
Start: 2021-04-16 | End: 2021-04-16

## 2021-04-16 RX ORDER — ONDANSETRON HYDROCHLORIDE 2 MG/ML
INJECTION, SOLUTION INTRAMUSCULAR; INTRAVENOUS
Status: DISCONTINUED | OUTPATIENT
Start: 2021-04-16 | End: 2021-04-16

## 2021-04-16 RX ORDER — LIDOCAINE HYDROCHLORIDE AND EPINEPHRINE 10; 10 MG/ML; UG/ML
INJECTION, SOLUTION INFILTRATION; PERINEURAL
Status: DISCONTINUED | OUTPATIENT
Start: 2021-04-16 | End: 2021-04-16 | Stop reason: HOSPADM

## 2021-04-16 RX ORDER — SUCCINYLCHOLINE CHLORIDE 20 MG/ML
INJECTION INTRAMUSCULAR; INTRAVENOUS
Status: DISCONTINUED | OUTPATIENT
Start: 2021-04-16 | End: 2021-04-16

## 2021-04-16 RX ORDER — HYDROMORPHONE HYDROCHLORIDE 1 MG/ML
0.2 INJECTION, SOLUTION INTRAMUSCULAR; INTRAVENOUS; SUBCUTANEOUS EVERY 5 MIN PRN
Status: DISCONTINUED | OUTPATIENT
Start: 2021-04-16 | End: 2021-04-16

## 2021-04-16 RX ORDER — DEXMEDETOMIDINE HYDROCHLORIDE 100 UG/ML
INJECTION, SOLUTION INTRAVENOUS
Status: DISCONTINUED | OUTPATIENT
Start: 2021-04-16 | End: 2021-04-16

## 2021-04-16 RX ORDER — ONDANSETRON 2 MG/ML
4 INJECTION INTRAMUSCULAR; INTRAVENOUS DAILY PRN
Status: DISCONTINUED | OUTPATIENT
Start: 2021-04-16 | End: 2021-04-17 | Stop reason: HOSPADM

## 2021-04-16 RX ADMIN — HYDROMORPHONE HYDROCHLORIDE 0.5 MG: 1 INJECTION, SOLUTION INTRAMUSCULAR; INTRAVENOUS; SUBCUTANEOUS at 09:04

## 2021-04-16 RX ADMIN — DOCUSATE SODIUM 50MG AND SENNOSIDES 8.6MG 1 TABLET: 8.6; 5 TABLET, FILM COATED ORAL at 08:04

## 2021-04-16 RX ADMIN — NICOTINE 1 PATCH: 7 PATCH TRANSDERMAL at 08:04

## 2021-04-16 RX ADMIN — FENTANYL CITRATE 50 MCG: 50 INJECTION, SOLUTION INTRAMUSCULAR; INTRAVENOUS at 02:04

## 2021-04-16 RX ADMIN — PHENYLEPHRINE HYDROCHLORIDE 100 MCG: 10 INJECTION INTRAVENOUS at 01:04

## 2021-04-16 RX ADMIN — LIDOCAINE HYDROCHLORIDE 60 MG: 20 INJECTION, SOLUTION INTRAVENOUS at 11:04

## 2021-04-16 RX ADMIN — MUPIROCIN: 20 OINTMENT TOPICAL at 08:04

## 2021-04-16 RX ADMIN — DEXMEDETOMIDINE HYDROCHLORIDE 4 MCG: 100 INJECTION, SOLUTION, CONCENTRATE INTRAVENOUS at 02:04

## 2021-04-16 RX ADMIN — HYDROMORPHONE HYDROCHLORIDE 0.6 MCG: 2 INJECTION, SOLUTION INTRAMUSCULAR; INTRAVENOUS; SUBCUTANEOUS at 02:04

## 2021-04-16 RX ADMIN — MIDAZOLAM HYDROCHLORIDE 4 MG: 1 INJECTION, SOLUTION INTRAMUSCULAR; INTRAVENOUS at 11:04

## 2021-04-16 RX ADMIN — HYDROMORPHONE HYDROCHLORIDE 0.5 MG: 1 INJECTION, SOLUTION INTRAMUSCULAR; INTRAVENOUS; SUBCUTANEOUS at 08:04

## 2021-04-16 RX ADMIN — FENTANYL CITRATE 50 MCG: 50 INJECTION, SOLUTION INTRAMUSCULAR; INTRAVENOUS at 12:04

## 2021-04-16 RX ADMIN — HYDROMORPHONE HYDROCHLORIDE 1 MG: 1 INJECTION, SOLUTION INTRAMUSCULAR; INTRAVENOUS; SUBCUTANEOUS at 02:04

## 2021-04-16 RX ADMIN — ONDANSETRON 4 MG: 2 INJECTION INTRAMUSCULAR; INTRAVENOUS at 07:04

## 2021-04-16 RX ADMIN — SUCRALFATE 1 G: 1 SUSPENSION ORAL at 05:04

## 2021-04-16 RX ADMIN — POLYETHYLENE GLYCOL 3350 17 G: 17 POWDER, FOR SOLUTION ORAL at 08:04

## 2021-04-16 RX ADMIN — SODIUM CHLORIDE: 0.9 INJECTION, SOLUTION INTRAVENOUS at 06:04

## 2021-04-16 RX ADMIN — OXYCODONE HYDROCHLORIDE 10 MG: 10 TABLET ORAL at 05:04

## 2021-04-16 RX ADMIN — ALUMINUM HYDROXIDE, MAGNESIUM HYDROXIDE, SIMETHICONE 30 ML: 400; 400; 40 SUSPENSION ORAL at 05:04

## 2021-04-16 RX ADMIN — OXYCODONE 5 MG: 5 TABLET ORAL at 08:04

## 2021-04-16 RX ADMIN — METHOCARBAMOL 500 MG: 500 TABLET ORAL at 08:04

## 2021-04-16 RX ADMIN — ROCURONIUM BROMIDE 5 MG: 10 INJECTION, SOLUTION INTRAVENOUS at 11:04

## 2021-04-16 RX ADMIN — HYDROMORPHONE HYDROCHLORIDE 0.5 MG: 1 INJECTION, SOLUTION INTRAMUSCULAR; INTRAVENOUS; SUBCUTANEOUS at 11:04

## 2021-04-16 RX ADMIN — SUCCINYLCHOLINE CHLORIDE 120 MG: 20 INJECTION, SOLUTION INTRAMUSCULAR; INTRAVENOUS at 11:04

## 2021-04-16 RX ADMIN — CEFTRIAXONE 2 G: 2 INJECTION, SOLUTION INTRAVENOUS at 06:04

## 2021-04-16 RX ADMIN — ROCURONIUM BROMIDE 45 MG: 10 INJECTION, SOLUTION INTRAVENOUS at 11:04

## 2021-04-16 RX ADMIN — FENTANYL CITRATE 100 MCG: 50 INJECTION, SOLUTION INTRAMUSCULAR; INTRAVENOUS at 11:04

## 2021-04-16 RX ADMIN — FENTANYL CITRATE 25 MCG: 50 INJECTION, SOLUTION INTRAMUSCULAR; INTRAVENOUS at 01:04

## 2021-04-16 RX ADMIN — CEFEPIME 2 G: 1 INJECTION, POWDER, FOR SOLUTION INTRAMUSCULAR; INTRAVENOUS at 05:04

## 2021-04-16 RX ADMIN — VANCOMYCIN HYDROCHLORIDE 1750 MG: 5 INJECTION, POWDER, LYOPHILIZED, FOR SOLUTION INTRAVENOUS at 06:04

## 2021-04-16 RX ADMIN — ONDANSETRON 4 MG: 2 INJECTION, SOLUTION INTRAMUSCULAR; INTRAVENOUS at 02:04

## 2021-04-16 RX ADMIN — HYDROMORPHONE HYDROCHLORIDE 2 MG: 1 INJECTION, SOLUTION INTRAMUSCULAR; INTRAVENOUS; SUBCUTANEOUS at 04:04

## 2021-04-16 RX ADMIN — FENTANYL CITRATE 25 MCG: 50 INJECTION, SOLUTION INTRAMUSCULAR; INTRAVENOUS at 02:04

## 2021-04-16 RX ADMIN — OXYCODONE AND ACETAMINOPHEN 1 TABLET: 10; 325 TABLET ORAL at 07:04

## 2021-04-16 RX ADMIN — HYDROMORPHONE HYDROCHLORIDE 0.5 MG: 1 INJECTION, SOLUTION INTRAMUSCULAR; INTRAVENOUS; SUBCUTANEOUS at 07:04

## 2021-04-16 RX ADMIN — CEFAZOLIN 3 G: 330 INJECTION, POWDER, FOR SOLUTION INTRAMUSCULAR; INTRAVENOUS at 01:04

## 2021-04-16 RX ADMIN — HYDROMORPHONE HYDROCHLORIDE 0.4 MCG: 2 INJECTION, SOLUTION INTRAMUSCULAR; INTRAVENOUS; SUBCUTANEOUS at 02:04

## 2021-04-16 RX ADMIN — ACETAMINOPHEN 1000 MG: 10 INJECTION, SOLUTION INTRAVENOUS at 02:04

## 2021-04-16 RX ADMIN — DEXMEDETOMIDINE HYDROCHLORIDE 12 MCG: 100 INJECTION, SOLUTION, CONCENTRATE INTRAVENOUS at 03:04

## 2021-04-16 RX ADMIN — CEFEPIME 2 G: 1 INJECTION, POWDER, FOR SOLUTION INTRAMUSCULAR; INTRAVENOUS at 04:04

## 2021-04-16 RX ADMIN — ALUMINUM HYDROXIDE, MAGNESIUM HYDROXIDE, SIMETHICONE 30 ML: 400; 400; 40 SUSPENSION ORAL at 08:04

## 2021-04-16 RX ADMIN — DEXMEDETOMIDINE HYDROCHLORIDE 12 MCG: 100 INJECTION, SOLUTION, CONCENTRATE INTRAVENOUS at 02:04

## 2021-04-16 RX ADMIN — GLYCOPYRROLATE 0.8 MCG: 0.2 INJECTION, SOLUTION INTRAMUSCULAR; INTRAVITREAL at 02:04

## 2021-04-16 RX ADMIN — SODIUM CHLORIDE: 0.9 INJECTION, SOLUTION INTRAVENOUS at 10:04

## 2021-04-16 RX ADMIN — Medication 30 MG: at 02:04

## 2021-04-16 RX ADMIN — ESMOLOL HYDROCHLORIDE 10 MCG: 100 INJECTION, SOLUTION INTRAVENOUS at 12:04

## 2021-04-16 RX ADMIN — NEOSTIGMINE METHYLSULFATE 5 MG: 0.5 INJECTION INTRAVENOUS at 02:04

## 2021-04-16 RX ADMIN — HYDROMORPHONE HYDROCHLORIDE 1 MG: 1 INJECTION, SOLUTION INTRAMUSCULAR; INTRAVENOUS; SUBCUTANEOUS at 08:04

## 2021-04-16 RX ADMIN — SUCRALFATE 1 G: 1 SUSPENSION ORAL at 11:04

## 2021-04-16 RX ADMIN — Medication 20 MG: at 03:04

## 2021-04-17 PROBLEM — F12.20 CANNABIS USE DISORDER, SEVERE, DEPENDENCE: Chronic | Status: RESOLVED | Noted: 2021-04-15 | Resolved: 2021-04-17

## 2021-04-17 LAB
ALBUMIN SERPL BCP-MCNC: 2 G/DL (ref 3.5–5.2)
ALP SERPL-CCNC: 91 U/L (ref 55–135)
ALT SERPL W/O P-5'-P-CCNC: 23 U/L (ref 10–44)
ANION GAP SERPL CALC-SCNC: 5 MMOL/L (ref 8–16)
AST SERPL-CCNC: 40 U/L (ref 10–40)
BACTERIA BLD CULT: ABNORMAL
BASOPHILS # BLD AUTO: 0.01 K/UL (ref 0–0.2)
BASOPHILS NFR BLD: 0.1 % (ref 0–1.9)
BILIRUB SERPL-MCNC: 1.8 MG/DL (ref 0.1–1)
BUN SERPL-MCNC: 13 MG/DL (ref 6–20)
CALCIUM SERPL-MCNC: 7.5 MG/DL (ref 8.7–10.5)
CHLORIDE SERPL-SCNC: 103 MMOL/L (ref 95–110)
CO2 SERPL-SCNC: 28 MMOL/L (ref 23–29)
CREAT SERPL-MCNC: 0.6 MG/DL (ref 0.5–1.4)
DIFFERENTIAL METHOD: ABNORMAL
EOSINOPHIL # BLD AUTO: 0.1 K/UL (ref 0–0.5)
EOSINOPHIL NFR BLD: 0.8 % (ref 0–8)
ERYTHROCYTE [DISTWIDTH] IN BLOOD BY AUTOMATED COUNT: 15.4 % (ref 11.5–14.5)
EST. GFR  (AFRICAN AMERICAN): >60 ML/MIN/1.73 M^2
EST. GFR  (NON AFRICAN AMERICAN): >60 ML/MIN/1.73 M^2
GLUCOSE SERPL-MCNC: 84 MG/DL (ref 70–110)
HCT VFR BLD AUTO: 28.3 % (ref 37–48.5)
HGB BLD-MCNC: 9.4 G/DL (ref 12–16)
IMM GRANULOCYTES # BLD AUTO: 0.08 K/UL (ref 0–0.04)
IMM GRANULOCYTES NFR BLD AUTO: 1 % (ref 0–0.5)
LYMPHOCYTES # BLD AUTO: 0.7 K/UL (ref 1–4.8)
LYMPHOCYTES NFR BLD: 9.1 % (ref 18–48)
MAGNESIUM SERPL-MCNC: 1.9 MG/DL (ref 1.6–2.6)
MCH RBC QN AUTO: 29.6 PG (ref 27–31)
MCHC RBC AUTO-ENTMCNC: 33.2 G/DL (ref 32–36)
MCV RBC AUTO: 89 FL (ref 82–98)
MONOCYTES # BLD AUTO: 0.6 K/UL (ref 0.3–1)
MONOCYTES NFR BLD: 7.4 % (ref 4–15)
NEUTROPHILS # BLD AUTO: 6.5 K/UL (ref 1.8–7.7)
NEUTROPHILS NFR BLD: 81.6 % (ref 38–73)
NRBC BLD-RTO: 0 /100 WBC
PHOSPHATE SERPL-MCNC: 2.7 MG/DL (ref 2.7–4.5)
PLATELET # BLD AUTO: 112 K/UL (ref 150–450)
PMV BLD AUTO: 9 FL (ref 9.2–12.9)
POCT GLUCOSE: 81 MG/DL (ref 70–110)
POCT GLUCOSE: 87 MG/DL (ref 70–110)
POTASSIUM SERPL-SCNC: 4.6 MMOL/L (ref 3.5–5.1)
PROT SERPL-MCNC: 5.8 G/DL (ref 6–8.4)
RBC # BLD AUTO: 3.18 M/UL (ref 4–5.4)
SODIUM SERPL-SCNC: 136 MMOL/L (ref 136–145)
VANCOMYCIN TROUGH SERPL-MCNC: <1.1 UG/ML (ref 10–22)
WBC # BLD AUTO: 7.92 K/UL (ref 3.9–12.7)

## 2021-04-17 PROCEDURE — 25000003 PHARM REV CODE 250: Performed by: PHYSICIAN ASSISTANT

## 2021-04-17 PROCEDURE — 80202 ASSAY OF VANCOMYCIN: CPT | Performed by: STUDENT IN AN ORGANIZED HEALTH CARE EDUCATION/TRAINING PROGRAM

## 2021-04-17 PROCEDURE — S4991 NICOTINE PATCH NONLEGEND: HCPCS | Performed by: STUDENT IN AN ORGANIZED HEALTH CARE EDUCATION/TRAINING PROGRAM

## 2021-04-17 PROCEDURE — 25000003 PHARM REV CODE 250: Performed by: STUDENT IN AN ORGANIZED HEALTH CARE EDUCATION/TRAINING PROGRAM

## 2021-04-17 PROCEDURE — 63600175 PHARM REV CODE 636 W HCPCS: Performed by: STUDENT IN AN ORGANIZED HEALTH CARE EDUCATION/TRAINING PROGRAM

## 2021-04-17 PROCEDURE — 27000221 HC OXYGEN, UP TO 24 HOURS

## 2021-04-17 PROCEDURE — 85025 COMPLETE CBC W/AUTO DIFF WBC: CPT | Performed by: STUDENT IN AN ORGANIZED HEALTH CARE EDUCATION/TRAINING PROGRAM

## 2021-04-17 PROCEDURE — 83735 ASSAY OF MAGNESIUM: CPT | Performed by: STUDENT IN AN ORGANIZED HEALTH CARE EDUCATION/TRAINING PROGRAM

## 2021-04-17 PROCEDURE — 99233 SBSQ HOSP IP/OBS HIGH 50: CPT | Mod: ,,, | Performed by: PHYSICIAN ASSISTANT

## 2021-04-17 PROCEDURE — 94761 N-INVAS EAR/PLS OXIMETRY MLT: CPT

## 2021-04-17 PROCEDURE — 25000003 PHARM REV CODE 250: Performed by: PSYCHIATRY & NEUROLOGY

## 2021-04-17 PROCEDURE — 80053 COMPREHEN METABOLIC PANEL: CPT | Performed by: PHYSICIAN ASSISTANT

## 2021-04-17 PROCEDURE — 99233 PR SUBSEQUENT HOSPITAL CARE,LEVL III: ICD-10-PCS | Mod: ,,, | Performed by: PSYCHIATRY & NEUROLOGY

## 2021-04-17 PROCEDURE — 99233 PR SUBSEQUENT HOSPITAL CARE,LEVL III: ICD-10-PCS | Mod: ,,, | Performed by: PHYSICIAN ASSISTANT

## 2021-04-17 PROCEDURE — 84100 ASSAY OF PHOSPHORUS: CPT | Performed by: STUDENT IN AN ORGANIZED HEALTH CARE EDUCATION/TRAINING PROGRAM

## 2021-04-17 PROCEDURE — 99233 SBSQ HOSP IP/OBS HIGH 50: CPT | Mod: ,,, | Performed by: PSYCHIATRY & NEUROLOGY

## 2021-04-17 PROCEDURE — 99900035 HC TECH TIME PER 15 MIN (STAT)

## 2021-04-17 PROCEDURE — 20000000 HC ICU ROOM

## 2021-04-17 RX ORDER — OXYCODONE HYDROCHLORIDE 5 MG/1
5 TABLET ORAL
Status: DISCONTINUED | OUTPATIENT
Start: 2021-04-17 | End: 2021-04-21

## 2021-04-17 RX ORDER — CYCLOBENZAPRINE HCL 5 MG
5 TABLET ORAL 3 TIMES DAILY
Status: DISCONTINUED | OUTPATIENT
Start: 2021-04-17 | End: 2021-04-26

## 2021-04-17 RX ORDER — ACETAMINOPHEN 500 MG
500 TABLET ORAL EVERY 6 HOURS
Status: DISCONTINUED | OUTPATIENT
Start: 2021-04-17 | End: 2021-04-20

## 2021-04-17 RX ORDER — HYDROMORPHONE HYDROCHLORIDE 1 MG/ML
0.2 INJECTION, SOLUTION INTRAMUSCULAR; INTRAVENOUS; SUBCUTANEOUS EVERY 4 HOURS PRN
Status: DISCONTINUED | OUTPATIENT
Start: 2021-04-17 | End: 2021-04-20

## 2021-04-17 RX ORDER — DRONABINOL 2.5 MG/1
5 CAPSULE ORAL 2 TIMES DAILY
Status: DISCONTINUED | OUTPATIENT
Start: 2021-04-17 | End: 2021-04-26

## 2021-04-17 RX ORDER — SODIUM CHLORIDE 9 MG/ML
INJECTION, SOLUTION INTRAVENOUS CONTINUOUS
Status: DISCONTINUED | OUTPATIENT
Start: 2021-04-17 | End: 2021-04-18

## 2021-04-17 RX ORDER — OXYCODONE HYDROCHLORIDE 10 MG/1
10 TABLET ORAL
Status: DISCONTINUED | OUTPATIENT
Start: 2021-04-17 | End: 2021-04-21

## 2021-04-17 RX ORDER — HYDROMORPHONE HYDROCHLORIDE 1 MG/ML
0.5 INJECTION, SOLUTION INTRAMUSCULAR; INTRAVENOUS; SUBCUTANEOUS EVERY 4 HOURS PRN
Status: DISCONTINUED | OUTPATIENT
Start: 2021-04-17 | End: 2021-04-17

## 2021-04-17 RX ADMIN — HYDROMORPHONE HYDROCHLORIDE 0.5 MG: 1 INJECTION, SOLUTION INTRAMUSCULAR; INTRAVENOUS; SUBCUTANEOUS at 10:04

## 2021-04-17 RX ADMIN — OXYCODONE AND ACETAMINOPHEN 1 TABLET: 10; 325 TABLET ORAL at 09:04

## 2021-04-17 RX ADMIN — SODIUM CHLORIDE: 0.9 INJECTION, SOLUTION INTRAVENOUS at 05:04

## 2021-04-17 RX ADMIN — POLYETHYLENE GLYCOL 3350 17 G: 17 POWDER, FOR SOLUTION ORAL at 08:04

## 2021-04-17 RX ADMIN — DRONABINOL 5 MG: 5 CAPSULE ORAL at 09:04

## 2021-04-17 RX ADMIN — SUCRALFATE 1 G: 1 SUSPENSION ORAL at 05:04

## 2021-04-17 RX ADMIN — HYDROMORPHONE HYDROCHLORIDE 0.2 MG: 1 INJECTION, SOLUTION INTRAMUSCULAR; INTRAVENOUS; SUBCUTANEOUS at 07:04

## 2021-04-17 RX ADMIN — OXYCODONE HYDROCHLORIDE 10 MG: 10 TABLET ORAL at 05:04

## 2021-04-17 RX ADMIN — ALUMINUM HYDROXIDE, MAGNESIUM HYDROXIDE, SIMETHICONE 30 ML: 400; 400; 40 SUSPENSION ORAL at 11:04

## 2021-04-17 RX ADMIN — HYDROMORPHONE HYDROCHLORIDE 0.5 MG: 1 INJECTION, SOLUTION INTRAMUSCULAR; INTRAVENOUS; SUBCUTANEOUS at 06:04

## 2021-04-17 RX ADMIN — OXYCODONE AND ACETAMINOPHEN 1 TABLET: 10; 325 TABLET ORAL at 12:04

## 2021-04-17 RX ADMIN — METHOCARBAMOL 500 MG: 500 TABLET ORAL at 12:04

## 2021-04-17 RX ADMIN — HYDROMORPHONE HYDROCHLORIDE 0.5 MG: 1 INJECTION, SOLUTION INTRAMUSCULAR; INTRAVENOUS; SUBCUTANEOUS at 03:04

## 2021-04-17 RX ADMIN — HYDROMORPHONE HYDROCHLORIDE 0.5 MG: 1 INJECTION, SOLUTION INTRAMUSCULAR; INTRAVENOUS; SUBCUTANEOUS at 08:04

## 2021-04-17 RX ADMIN — SUCRALFATE 1 G: 1 SUSPENSION ORAL at 12:04

## 2021-04-17 RX ADMIN — METHOCARBAMOL 500 MG: 500 TABLET ORAL at 08:04

## 2021-04-17 RX ADMIN — CYCLOBENZAPRINE HYDROCHLORIDE 5 MG: 5 TABLET, FILM COATED ORAL at 09:04

## 2021-04-17 RX ADMIN — MUPIROCIN: 20 OINTMENT TOPICAL at 08:04

## 2021-04-17 RX ADMIN — ACETAMINOPHEN 500 MG: 500 TABLET, FILM COATED ORAL at 05:04

## 2021-04-17 RX ADMIN — HYDROMORPHONE HYDROCHLORIDE 0.5 MG: 1 INJECTION, SOLUTION INTRAMUSCULAR; INTRAVENOUS; SUBCUTANEOUS at 12:04

## 2021-04-17 RX ADMIN — ALUMINUM HYDROXIDE, MAGNESIUM HYDROXIDE, SIMETHICONE 30 ML: 400; 400; 40 SUSPENSION ORAL at 08:04

## 2021-04-17 RX ADMIN — ALUMINUM HYDROXIDE, MAGNESIUM HYDROXIDE, SIMETHICONE 30 ML: 400; 400; 40 SUSPENSION ORAL at 05:04

## 2021-04-17 RX ADMIN — DRONABINOL 5 MG: 5 CAPSULE ORAL at 02:04

## 2021-04-17 RX ADMIN — SODIUM CHLORIDE: 0.9 INJECTION, SOLUTION INTRAVENOUS at 02:04

## 2021-04-17 RX ADMIN — NICOTINE 1 PATCH: 7 PATCH TRANSDERMAL at 09:04

## 2021-04-17 RX ADMIN — HYDROMORPHONE HYDROCHLORIDE 0.5 MG: 1 INJECTION, SOLUTION INTRAMUSCULAR; INTRAVENOUS; SUBCUTANEOUS at 02:04

## 2021-04-17 RX ADMIN — DOCUSATE SODIUM 50MG AND SENNOSIDES 8.6MG 1 TABLET: 8.6; 5 TABLET, FILM COATED ORAL at 08:04

## 2021-04-17 RX ADMIN — OXYCODONE AND ACETAMINOPHEN 1 TABLET: 10; 325 TABLET ORAL at 01:04

## 2021-04-17 RX ADMIN — HYDROMORPHONE HYDROCHLORIDE 0.5 MG: 1 INJECTION, SOLUTION INTRAMUSCULAR; INTRAVENOUS; SUBCUTANEOUS at 01:04

## 2021-04-17 RX ADMIN — ACETAMINOPHEN 500 MG: 500 TABLET, FILM COATED ORAL at 11:04

## 2021-04-17 RX ADMIN — OXYCODONE HYDROCHLORIDE 10 MG: 10 TABLET ORAL at 10:04

## 2021-04-17 RX ADMIN — SUCRALFATE 1 G: 1 SUSPENSION ORAL at 11:04

## 2021-04-18 LAB
ALBUMIN SERPL BCP-MCNC: 1.9 G/DL (ref 3.5–5.2)
ALP SERPL-CCNC: 99 U/L (ref 55–135)
ALT SERPL W/O P-5'-P-CCNC: 23 U/L (ref 10–44)
ANION GAP SERPL CALC-SCNC: 3 MMOL/L (ref 8–16)
AST SERPL-CCNC: 38 U/L (ref 10–40)
BACTERIA SPEC AEROBE CULT: ABNORMAL
BACTERIA SPEC AEROBE CULT: ABNORMAL
BASOPHILS # BLD AUTO: 0.01 K/UL (ref 0–0.2)
BASOPHILS NFR BLD: 0.1 % (ref 0–1.9)
BILIRUB SERPL-MCNC: 1.2 MG/DL (ref 0.1–1)
BUN SERPL-MCNC: 11 MG/DL (ref 6–20)
CALCIUM SERPL-MCNC: 7.5 MG/DL (ref 8.7–10.5)
CHLORIDE SERPL-SCNC: 100 MMOL/L (ref 95–110)
CO2 SERPL-SCNC: 32 MMOL/L (ref 23–29)
CREAT SERPL-MCNC: 0.6 MG/DL (ref 0.5–1.4)
DIFFERENTIAL METHOD: ABNORMAL
EOSINOPHIL # BLD AUTO: 0.1 K/UL (ref 0–0.5)
EOSINOPHIL NFR BLD: 1.5 % (ref 0–8)
ERYTHROCYTE [DISTWIDTH] IN BLOOD BY AUTOMATED COUNT: 15.3 % (ref 11.5–14.5)
EST. GFR  (AFRICAN AMERICAN): >60 ML/MIN/1.73 M^2
EST. GFR  (NON AFRICAN AMERICAN): >60 ML/MIN/1.73 M^2
GLUCOSE SERPL-MCNC: 114 MG/DL (ref 70–110)
HCT VFR BLD AUTO: 28.5 % (ref 37–48.5)
HGB BLD-MCNC: 9.3 G/DL (ref 12–16)
IMM GRANULOCYTES # BLD AUTO: 0.06 K/UL (ref 0–0.04)
IMM GRANULOCYTES NFR BLD AUTO: 0.9 % (ref 0–0.5)
LYMPHOCYTES # BLD AUTO: 0.7 K/UL (ref 1–4.8)
LYMPHOCYTES NFR BLD: 10.4 % (ref 18–48)
MAGNESIUM SERPL-MCNC: 1.8 MG/DL (ref 1.6–2.6)
MCH RBC QN AUTO: 30 PG (ref 27–31)
MCHC RBC AUTO-ENTMCNC: 32.6 G/DL (ref 32–36)
MCV RBC AUTO: 92 FL (ref 82–98)
MONOCYTES # BLD AUTO: 0.6 K/UL (ref 0.3–1)
MONOCYTES NFR BLD: 8.8 % (ref 4–15)
NEUTROPHILS # BLD AUTO: 5.3 K/UL (ref 1.8–7.7)
NEUTROPHILS NFR BLD: 78.3 % (ref 38–73)
NRBC BLD-RTO: 0 /100 WBC
PHOSPHATE SERPL-MCNC: 2.1 MG/DL (ref 2.7–4.5)
PLATELET # BLD AUTO: 103 K/UL (ref 150–450)
PLATELET # BLD AUTO: 92 K/UL (ref 150–450)
PMV BLD AUTO: 9.1 FL (ref 9.2–12.9)
PMV BLD AUTO: 9.1 FL (ref 9.2–12.9)
POTASSIUM SERPL-SCNC: 4.3 MMOL/L (ref 3.5–5.1)
PROT SERPL-MCNC: 5.9 G/DL (ref 6–8.4)
RBC # BLD AUTO: 3.1 M/UL (ref 4–5.4)
SODIUM SERPL-SCNC: 135 MMOL/L (ref 136–145)
WBC # BLD AUTO: 6.72 K/UL (ref 3.9–12.7)

## 2021-04-18 PROCEDURE — 99233 PR SUBSEQUENT HOSPITAL CARE,LEVL III: ICD-10-PCS | Mod: ,,, | Performed by: PSYCHIATRY & NEUROLOGY

## 2021-04-18 PROCEDURE — 80053 COMPREHEN METABOLIC PANEL: CPT | Performed by: PHYSICIAN ASSISTANT

## 2021-04-18 PROCEDURE — 99233 SBSQ HOSP IP/OBS HIGH 50: CPT | Mod: ,,, | Performed by: PSYCHIATRY & NEUROLOGY

## 2021-04-18 PROCEDURE — 25000003 PHARM REV CODE 250: Performed by: STUDENT IN AN ORGANIZED HEALTH CARE EDUCATION/TRAINING PROGRAM

## 2021-04-18 PROCEDURE — 97535 SELF CARE MNGMENT TRAINING: CPT

## 2021-04-18 PROCEDURE — 27000221 HC OXYGEN, UP TO 24 HOURS

## 2021-04-18 PROCEDURE — 25000003 PHARM REV CODE 250: Performed by: PHYSICIAN ASSISTANT

## 2021-04-18 PROCEDURE — 99233 PR SUBSEQUENT HOSPITAL CARE,LEVL III: ICD-10-PCS | Mod: ,,, | Performed by: PHYSICIAN ASSISTANT

## 2021-04-18 PROCEDURE — 99233 SBSQ HOSP IP/OBS HIGH 50: CPT | Mod: ,,, | Performed by: PHYSICIAN ASSISTANT

## 2021-04-18 PROCEDURE — 83735 ASSAY OF MAGNESIUM: CPT | Performed by: STUDENT IN AN ORGANIZED HEALTH CARE EDUCATION/TRAINING PROGRAM

## 2021-04-18 PROCEDURE — 94761 N-INVAS EAR/PLS OXIMETRY MLT: CPT

## 2021-04-18 PROCEDURE — 97164 PT RE-EVAL EST PLAN CARE: CPT

## 2021-04-18 PROCEDURE — 63600175 PHARM REV CODE 636 W HCPCS: Performed by: STUDENT IN AN ORGANIZED HEALTH CARE EDUCATION/TRAINING PROGRAM

## 2021-04-18 PROCEDURE — 84100 ASSAY OF PHOSPHORUS: CPT | Performed by: STUDENT IN AN ORGANIZED HEALTH CARE EDUCATION/TRAINING PROGRAM

## 2021-04-18 PROCEDURE — 97530 THERAPEUTIC ACTIVITIES: CPT

## 2021-04-18 PROCEDURE — 97168 OT RE-EVAL EST PLAN CARE: CPT

## 2021-04-18 PROCEDURE — 85025 COMPLETE CBC W/AUTO DIFF WBC: CPT | Performed by: STUDENT IN AN ORGANIZED HEALTH CARE EDUCATION/TRAINING PROGRAM

## 2021-04-18 PROCEDURE — S4991 NICOTINE PATCH NONLEGEND: HCPCS | Performed by: STUDENT IN AN ORGANIZED HEALTH CARE EDUCATION/TRAINING PROGRAM

## 2021-04-18 PROCEDURE — 20000000 HC ICU ROOM

## 2021-04-18 PROCEDURE — 85049 AUTOMATED PLATELET COUNT: CPT | Performed by: STUDENT IN AN ORGANIZED HEALTH CARE EDUCATION/TRAINING PROGRAM

## 2021-04-18 RX ORDER — POTASSIUM CHLORIDE 14.9 MG/ML
40 INJECTION INTRAVENOUS
Status: DISCONTINUED | OUTPATIENT
Start: 2021-04-18 | End: 2021-04-19

## 2021-04-18 RX ORDER — MAGNESIUM SULFATE HEPTAHYDRATE 40 MG/ML
4 INJECTION, SOLUTION INTRAVENOUS
Status: DISCONTINUED | OUTPATIENT
Start: 2021-04-18 | End: 2021-04-19

## 2021-04-18 RX ORDER — SILODOSIN 4 MG/1
4 CAPSULE ORAL DAILY
Status: DISCONTINUED | OUTPATIENT
Start: 2021-04-18 | End: 2021-04-23

## 2021-04-18 RX ORDER — HEPARIN SODIUM 5000 [USP'U]/ML
7500 INJECTION, SOLUTION INTRAVENOUS; SUBCUTANEOUS EVERY 8 HOURS
Status: DISCONTINUED | OUTPATIENT
Start: 2021-04-18 | End: 2021-04-27 | Stop reason: HOSPADM

## 2021-04-18 RX ORDER — MAGNESIUM SULFATE HEPTAHYDRATE 40 MG/ML
2 INJECTION, SOLUTION INTRAVENOUS
Status: DISCONTINUED | OUTPATIENT
Start: 2021-04-18 | End: 2021-04-19

## 2021-04-18 RX ORDER — POTASSIUM CHLORIDE 29.8 MG/ML
40 INJECTION INTRAVENOUS
Status: DISCONTINUED | OUTPATIENT
Start: 2021-04-18 | End: 2021-04-19

## 2021-04-18 RX ORDER — SODIUM CHLORIDE 9 MG/ML
INJECTION, SOLUTION INTRAVENOUS CONTINUOUS
Status: DISCONTINUED | OUTPATIENT
Start: 2021-04-18 | End: 2021-04-27 | Stop reason: HOSPADM

## 2021-04-18 RX ORDER — POTASSIUM CHLORIDE 29.8 MG/ML
60 INJECTION INTRAVENOUS
Status: DISCONTINUED | OUTPATIENT
Start: 2021-04-18 | End: 2021-04-19

## 2021-04-18 RX ADMIN — CEFTRIAXONE 2 G: 2 INJECTION, SOLUTION INTRAVENOUS at 06:04

## 2021-04-18 RX ADMIN — ALUMINUM HYDROXIDE, MAGNESIUM HYDROXIDE, SIMETHICONE 30 ML: 400; 400; 40 SUSPENSION ORAL at 11:04

## 2021-04-18 RX ADMIN — MAGNESIUM SULFATE 2 G: 2 INJECTION INTRAVENOUS at 11:04

## 2021-04-18 RX ADMIN — ACETAMINOPHEN 500 MG: 500 TABLET, FILM COATED ORAL at 05:04

## 2021-04-18 RX ADMIN — SUCRALFATE 1 G: 1 SUSPENSION ORAL at 11:04

## 2021-04-18 RX ADMIN — ALUMINUM HYDROXIDE, MAGNESIUM HYDROXIDE, SIMETHICONE 30 ML: 400; 400; 40 SUSPENSION ORAL at 08:04

## 2021-04-18 RX ADMIN — HYDROMORPHONE HYDROCHLORIDE 0.2 MG: 1 INJECTION, SOLUTION INTRAMUSCULAR; INTRAVENOUS; SUBCUTANEOUS at 08:04

## 2021-04-18 RX ADMIN — HYDROMORPHONE HYDROCHLORIDE 0.2 MG: 1 INJECTION, SOLUTION INTRAMUSCULAR; INTRAVENOUS; SUBCUTANEOUS at 12:04

## 2021-04-18 RX ADMIN — DRONABINOL 5 MG: 5 CAPSULE ORAL at 08:04

## 2021-04-18 RX ADMIN — CYCLOBENZAPRINE HYDROCHLORIDE 5 MG: 5 TABLET, FILM COATED ORAL at 02:04

## 2021-04-18 RX ADMIN — CYCLOBENZAPRINE HYDROCHLORIDE 5 MG: 5 TABLET, FILM COATED ORAL at 08:04

## 2021-04-18 RX ADMIN — SUCRALFATE 1 G: 1 SUSPENSION ORAL at 05:04

## 2021-04-18 RX ADMIN — ALUMINUM HYDROXIDE, MAGNESIUM HYDROXIDE, SIMETHICONE 30 ML: 400; 400; 40 SUSPENSION ORAL at 05:04

## 2021-04-18 RX ADMIN — ACETAMINOPHEN 500 MG: 500 TABLET, FILM COATED ORAL at 11:04

## 2021-04-18 RX ADMIN — MUPIROCIN: 20 OINTMENT TOPICAL at 08:04

## 2021-04-18 RX ADMIN — HEPARIN SODIUM 7500 UNITS: 5000 INJECTION INTRAVENOUS; SUBCUTANEOUS at 08:04

## 2021-04-18 RX ADMIN — HYDROMORPHONE HYDROCHLORIDE 0.2 MG: 1 INJECTION, SOLUTION INTRAMUSCULAR; INTRAVENOUS; SUBCUTANEOUS at 05:04

## 2021-04-18 RX ADMIN — SODIUM CHLORIDE 500 ML: 0.9 INJECTION, SOLUTION INTRAVENOUS at 11:04

## 2021-04-18 RX ADMIN — SODIUM PHOSPHATE, MONOBASIC, MONOHYDRATE 20.01 MMOL: 276; 142 INJECTION, SOLUTION INTRAVENOUS at 01:04

## 2021-04-18 RX ADMIN — OXYCODONE HYDROCHLORIDE 10 MG: 10 TABLET ORAL at 07:04

## 2021-04-18 RX ADMIN — POLYETHYLENE GLYCOL 3350 17 G: 17 POWDER, FOR SOLUTION ORAL at 08:04

## 2021-04-18 RX ADMIN — HYDROMORPHONE HYDROCHLORIDE 0.2 MG: 1 INJECTION, SOLUTION INTRAMUSCULAR; INTRAVENOUS; SUBCUTANEOUS at 09:04

## 2021-04-18 RX ADMIN — DOCUSATE SODIUM 50MG AND SENNOSIDES 8.6MG 1 TABLET: 8.6; 5 TABLET, FILM COATED ORAL at 08:04

## 2021-04-18 RX ADMIN — NICOTINE 1 PATCH: 7 PATCH TRANSDERMAL at 08:04

## 2021-04-18 RX ADMIN — SODIUM CHLORIDE 10 ML/HR: 0.9 INJECTION, SOLUTION INTRAVENOUS at 11:04

## 2021-04-18 RX ADMIN — SILODOSIN 4 MG: 4 CAPSULE ORAL at 11:04

## 2021-04-18 RX ADMIN — HEPARIN SODIUM 7500 UNITS: 5000 INJECTION INTRAVENOUS; SUBCUTANEOUS at 12:04

## 2021-04-18 RX ADMIN — OXYCODONE HYDROCHLORIDE 10 MG: 10 TABLET ORAL at 05:04

## 2021-04-18 RX ADMIN — OXYCODONE HYDROCHLORIDE 10 MG: 10 TABLET ORAL at 02:04

## 2021-04-19 LAB
ALBUMIN SERPL BCP-MCNC: 1.9 G/DL (ref 3.5–5.2)
ALP SERPL-CCNC: 95 U/L (ref 55–135)
ALT SERPL W/O P-5'-P-CCNC: 18 U/L (ref 10–44)
ANION GAP SERPL CALC-SCNC: 1 MMOL/L (ref 8–16)
AST SERPL-CCNC: 32 U/L (ref 10–40)
BASOPHILS # BLD AUTO: 0.02 K/UL (ref 0–0.2)
BASOPHILS NFR BLD: 0.3 % (ref 0–1.9)
BILIRUB SERPL-MCNC: 1.1 MG/DL (ref 0.1–1)
BUN SERPL-MCNC: 9 MG/DL (ref 6–20)
CALCIUM SERPL-MCNC: 7.3 MG/DL (ref 8.7–10.5)
CHLORIDE SERPL-SCNC: 101 MMOL/L (ref 95–110)
CO2 SERPL-SCNC: 32 MMOL/L (ref 23–29)
CREAT SERPL-MCNC: 0.6 MG/DL (ref 0.5–1.4)
DIFFERENTIAL METHOD: ABNORMAL
EOSINOPHIL # BLD AUTO: 0.1 K/UL (ref 0–0.5)
EOSINOPHIL NFR BLD: 1.7 % (ref 0–8)
ERYTHROCYTE [DISTWIDTH] IN BLOOD BY AUTOMATED COUNT: 15.6 % (ref 11.5–14.5)
EST. GFR  (AFRICAN AMERICAN): >60 ML/MIN/1.73 M^2
EST. GFR  (NON AFRICAN AMERICAN): >60 ML/MIN/1.73 M^2
GLUCOSE SERPL-MCNC: 111 MG/DL (ref 70–110)
HCT VFR BLD AUTO: 28.5 % (ref 37–48.5)
HGB BLD-MCNC: 9.1 G/DL (ref 12–16)
IMM GRANULOCYTES # BLD AUTO: 0.09 K/UL (ref 0–0.04)
IMM GRANULOCYTES NFR BLD AUTO: 1.3 % (ref 0–0.5)
LYMPHOCYTES # BLD AUTO: 0.9 K/UL (ref 1–4.8)
LYMPHOCYTES NFR BLD: 12.8 % (ref 18–48)
MAGNESIUM SERPL-MCNC: 1.8 MG/DL (ref 1.6–2.6)
MCH RBC QN AUTO: 29.2 PG (ref 27–31)
MCHC RBC AUTO-ENTMCNC: 31.9 G/DL (ref 32–36)
MCV RBC AUTO: 91 FL (ref 82–98)
MONOCYTES # BLD AUTO: 0.7 K/UL (ref 0.3–1)
MONOCYTES NFR BLD: 9.3 % (ref 4–15)
NEUTROPHILS # BLD AUTO: 5.4 K/UL (ref 1.8–7.7)
NEUTROPHILS NFR BLD: 74.6 % (ref 38–73)
NRBC BLD-RTO: 0 /100 WBC
PHOSPHATE SERPL-MCNC: 2.7 MG/DL (ref 2.7–4.5)
PLATELET # BLD AUTO: 114 K/UL (ref 150–450)
PMV BLD AUTO: 9.2 FL (ref 9.2–12.9)
POTASSIUM SERPL-SCNC: 4.3 MMOL/L (ref 3.5–5.1)
PROT SERPL-MCNC: 5.8 G/DL (ref 6–8.4)
RBC # BLD AUTO: 3.12 M/UL (ref 4–5.4)
SODIUM SERPL-SCNC: 134 MMOL/L (ref 136–145)
WBC # BLD AUTO: 7.2 K/UL (ref 3.9–12.7)

## 2021-04-19 PROCEDURE — 63600175 PHARM REV CODE 636 W HCPCS: Performed by: STUDENT IN AN ORGANIZED HEALTH CARE EDUCATION/TRAINING PROGRAM

## 2021-04-19 PROCEDURE — 99232 SBSQ HOSP IP/OBS MODERATE 35: CPT | Mod: AF,HB,, | Performed by: PSYCHIATRY & NEUROLOGY

## 2021-04-19 PROCEDURE — 25000003 PHARM REV CODE 250: Performed by: STUDENT IN AN ORGANIZED HEALTH CARE EDUCATION/TRAINING PROGRAM

## 2021-04-19 PROCEDURE — 94761 N-INVAS EAR/PLS OXIMETRY MLT: CPT

## 2021-04-19 PROCEDURE — 11000001 HC ACUTE MED/SURG PRIVATE ROOM

## 2021-04-19 PROCEDURE — 99233 SBSQ HOSP IP/OBS HIGH 50: CPT | Mod: ,,, | Performed by: NURSE PRACTITIONER

## 2021-04-19 PROCEDURE — 97530 THERAPEUTIC ACTIVITIES: CPT

## 2021-04-19 PROCEDURE — S4991 NICOTINE PATCH NONLEGEND: HCPCS | Performed by: STUDENT IN AN ORGANIZED HEALTH CARE EDUCATION/TRAINING PROGRAM

## 2021-04-19 PROCEDURE — 25000003 PHARM REV CODE 250: Performed by: PHYSICIAN ASSISTANT

## 2021-04-19 PROCEDURE — 80053 COMPREHEN METABOLIC PANEL: CPT | Performed by: PHYSICIAN ASSISTANT

## 2021-04-19 PROCEDURE — 27000221 HC OXYGEN, UP TO 24 HOURS

## 2021-04-19 PROCEDURE — 83735 ASSAY OF MAGNESIUM: CPT | Performed by: STUDENT IN AN ORGANIZED HEALTH CARE EDUCATION/TRAINING PROGRAM

## 2021-04-19 PROCEDURE — 97535 SELF CARE MNGMENT TRAINING: CPT

## 2021-04-19 PROCEDURE — 84100 ASSAY OF PHOSPHORUS: CPT | Performed by: STUDENT IN AN ORGANIZED HEALTH CARE EDUCATION/TRAINING PROGRAM

## 2021-04-19 PROCEDURE — 63600175 PHARM REV CODE 636 W HCPCS: Performed by: PHYSICIAN ASSISTANT

## 2021-04-19 PROCEDURE — 85025 COMPLETE CBC W/AUTO DIFF WBC: CPT | Performed by: STUDENT IN AN ORGANIZED HEALTH CARE EDUCATION/TRAINING PROGRAM

## 2021-04-19 PROCEDURE — 99233 PR SUBSEQUENT HOSPITAL CARE,LEVL III: ICD-10-PCS | Mod: ,,, | Performed by: NURSE PRACTITIONER

## 2021-04-19 PROCEDURE — 99232 PR SUBSEQUENT HOSPITAL CARE,LEVL II: ICD-10-PCS | Mod: AF,HB,, | Performed by: PSYCHIATRY & NEUROLOGY

## 2021-04-19 PROCEDURE — 97112 NEUROMUSCULAR REEDUCATION: CPT

## 2021-04-19 RX ORDER — HYDROMORPHONE HYDROCHLORIDE 1 MG/ML
0.2 INJECTION, SOLUTION INTRAMUSCULAR; INTRAVENOUS; SUBCUTANEOUS ONCE
Status: COMPLETED | OUTPATIENT
Start: 2021-04-19 | End: 2021-04-19

## 2021-04-19 RX ADMIN — OXYCODONE HYDROCHLORIDE 10 MG: 10 TABLET ORAL at 01:04

## 2021-04-19 RX ADMIN — CYCLOBENZAPRINE HYDROCHLORIDE 5 MG: 5 TABLET, FILM COATED ORAL at 08:04

## 2021-04-19 RX ADMIN — DRONABINOL 5 MG: 5 CAPSULE ORAL at 09:04

## 2021-04-19 RX ADMIN — SODIUM CHLORIDE: 0.9 INJECTION, SOLUTION INTRAVENOUS at 12:04

## 2021-04-19 RX ADMIN — CEFTRIAXONE 2 G: 2 INJECTION, SOLUTION INTRAVENOUS at 06:04

## 2021-04-19 RX ADMIN — ALUMINUM HYDROXIDE, MAGNESIUM HYDROXIDE, SIMETHICONE 30 ML: 400; 400; 40 SUSPENSION ORAL at 11:04

## 2021-04-19 RX ADMIN — SUCRALFATE 1 G: 1 SUSPENSION ORAL at 11:04

## 2021-04-19 RX ADMIN — HYDROMORPHONE HYDROCHLORIDE 0.2 MG: 1 INJECTION, SOLUTION INTRAMUSCULAR; INTRAVENOUS; SUBCUTANEOUS at 08:04

## 2021-04-19 RX ADMIN — ACETAMINOPHEN 500 MG: 500 TABLET, FILM COATED ORAL at 05:04

## 2021-04-19 RX ADMIN — DOCUSATE SODIUM 50MG AND SENNOSIDES 8.6MG 1 TABLET: 8.6; 5 TABLET, FILM COATED ORAL at 08:04

## 2021-04-19 RX ADMIN — MUPIROCIN: 20 OINTMENT TOPICAL at 09:04

## 2021-04-19 RX ADMIN — ACETAMINOPHEN 500 MG: 500 TABLET, FILM COATED ORAL at 11:04

## 2021-04-19 RX ADMIN — HYDROMORPHONE HYDROCHLORIDE 0.2 MG: 1 INJECTION, SOLUTION INTRAMUSCULAR; INTRAVENOUS; SUBCUTANEOUS at 06:04

## 2021-04-19 RX ADMIN — HEPARIN SODIUM 7500 UNITS: 5000 INJECTION INTRAVENOUS; SUBCUTANEOUS at 09:04

## 2021-04-19 RX ADMIN — HEPARIN SODIUM 7500 UNITS: 5000 INJECTION INTRAVENOUS; SUBCUTANEOUS at 01:04

## 2021-04-19 RX ADMIN — DRONABINOL 5 MG: 5 CAPSULE ORAL at 11:04

## 2021-04-19 RX ADMIN — CYCLOBENZAPRINE HYDROCHLORIDE 5 MG: 5 TABLET, FILM COATED ORAL at 09:04

## 2021-04-19 RX ADMIN — HYDROMORPHONE HYDROCHLORIDE 0.2 MG: 1 INJECTION, SOLUTION INTRAMUSCULAR; INTRAVENOUS; SUBCUTANEOUS at 12:04

## 2021-04-19 RX ADMIN — POLYETHYLENE GLYCOL 3350 17 G: 17 POWDER, FOR SOLUTION ORAL at 08:04

## 2021-04-19 RX ADMIN — NICOTINE 1 PATCH: 7 PATCH TRANSDERMAL at 09:04

## 2021-04-19 RX ADMIN — SUCRALFATE 1 G: 1 SUSPENSION ORAL at 06:04

## 2021-04-19 RX ADMIN — OXYCODONE HYDROCHLORIDE 10 MG: 10 TABLET ORAL at 09:04

## 2021-04-19 RX ADMIN — MUPIROCIN: 20 OINTMENT TOPICAL at 08:04

## 2021-04-19 RX ADMIN — HYDROMORPHONE HYDROCHLORIDE 0.2 MG: 1 INJECTION, SOLUTION INTRAMUSCULAR; INTRAVENOUS; SUBCUTANEOUS at 04:04

## 2021-04-19 RX ADMIN — OXYCODONE HYDROCHLORIDE 10 MG: 10 TABLET ORAL at 06:04

## 2021-04-19 RX ADMIN — SUCRALFATE 1 G: 1 SUSPENSION ORAL at 05:04

## 2021-04-19 RX ADMIN — CYCLOBENZAPRINE HYDROCHLORIDE 5 MG: 5 TABLET, FILM COATED ORAL at 02:04

## 2021-04-19 RX ADMIN — SILODOSIN 4 MG: 4 CAPSULE ORAL at 08:04

## 2021-04-19 RX ADMIN — HEPARIN SODIUM 7500 UNITS: 5000 INJECTION INTRAVENOUS; SUBCUTANEOUS at 06:04

## 2021-04-19 RX ADMIN — ALUMINUM HYDROXIDE, MAGNESIUM HYDROXIDE, SIMETHICONE 30 ML: 400; 400; 40 SUSPENSION ORAL at 04:04

## 2021-04-19 RX ADMIN — ALUMINUM HYDROXIDE, MAGNESIUM HYDROXIDE, SIMETHICONE 30 ML: 400; 400; 40 SUSPENSION ORAL at 06:04

## 2021-04-19 RX ADMIN — ACETAMINOPHEN 500 MG: 500 TABLET, FILM COATED ORAL at 06:04

## 2021-04-20 LAB
ALBUMIN SERPL BCP-MCNC: 1.8 G/DL (ref 3.5–5.2)
ALP SERPL-CCNC: 97 U/L (ref 55–135)
ALT SERPL W/O P-5'-P-CCNC: 17 U/L (ref 10–44)
ANION GAP SERPL CALC-SCNC: 2 MMOL/L (ref 8–16)
AST SERPL-CCNC: 34 U/L (ref 10–40)
BACTERIA BLD CULT: NORMAL
BACTERIA SPEC AEROBE CULT: NO GROWTH
BACTERIA SPEC ANAEROBE CULT: NORMAL
BACTERIA SPEC ANAEROBE CULT: NORMAL
BASOPHILS # BLD AUTO: 0.02 K/UL (ref 0–0.2)
BASOPHILS NFR BLD: 0.4 % (ref 0–1.9)
BILIRUB SERPL-MCNC: 1 MG/DL (ref 0.1–1)
BUN SERPL-MCNC: 12 MG/DL (ref 6–20)
CALCIUM SERPL-MCNC: 7.5 MG/DL (ref 8.7–10.5)
CHLORIDE SERPL-SCNC: 103 MMOL/L (ref 95–110)
CO2 SERPL-SCNC: 31 MMOL/L (ref 23–29)
CREAT SERPL-MCNC: 0.6 MG/DL (ref 0.5–1.4)
DIFFERENTIAL METHOD: ABNORMAL
EOSINOPHIL # BLD AUTO: 0.1 K/UL (ref 0–0.5)
EOSINOPHIL NFR BLD: 2.2 % (ref 0–8)
ERYTHROCYTE [DISTWIDTH] IN BLOOD BY AUTOMATED COUNT: 16 % (ref 11.5–14.5)
EST. GFR  (AFRICAN AMERICAN): >60 ML/MIN/1.73 M^2
EST. GFR  (NON AFRICAN AMERICAN): >60 ML/MIN/1.73 M^2
GLUCOSE SERPL-MCNC: 116 MG/DL (ref 70–110)
HCT VFR BLD AUTO: 27 % (ref 37–48.5)
HGB BLD-MCNC: 8.8 G/DL (ref 12–16)
IMM GRANULOCYTES # BLD AUTO: 0.06 K/UL (ref 0–0.04)
IMM GRANULOCYTES NFR BLD AUTO: 1.1 % (ref 0–0.5)
LYMPHOCYTES # BLD AUTO: 0.9 K/UL (ref 1–4.8)
LYMPHOCYTES NFR BLD: 15.8 % (ref 18–48)
MAGNESIUM SERPL-MCNC: 1.9 MG/DL (ref 1.6–2.6)
MCH RBC QN AUTO: 30.3 PG (ref 27–31)
MCHC RBC AUTO-ENTMCNC: 32.6 G/DL (ref 32–36)
MCV RBC AUTO: 93 FL (ref 82–98)
MONOCYTES # BLD AUTO: 0.6 K/UL (ref 0.3–1)
MONOCYTES NFR BLD: 10.4 % (ref 4–15)
NEUTROPHILS # BLD AUTO: 3.8 K/UL (ref 1.8–7.7)
NEUTROPHILS NFR BLD: 70.1 % (ref 38–73)
NRBC BLD-RTO: 0 /100 WBC
PHOSPHATE SERPL-MCNC: 2.9 MG/DL (ref 2.7–4.5)
PHOSPHATIDYLETHANOL (PETH): NEGATIVE NG/ML
PLATELET # BLD AUTO: 96 K/UL (ref 150–450)
PMV BLD AUTO: 9.1 FL (ref 9.2–12.9)
POCT GLUCOSE: 120 MG/DL (ref 70–110)
POTASSIUM SERPL-SCNC: 4.2 MMOL/L (ref 3.5–5.1)
PROT SERPL-MCNC: 5.7 G/DL (ref 6–8.4)
RBC # BLD AUTO: 2.9 M/UL (ref 4–5.4)
SODIUM SERPL-SCNC: 136 MMOL/L (ref 136–145)
WBC # BLD AUTO: 5.46 K/UL (ref 3.9–12.7)

## 2021-04-20 PROCEDURE — 63600175 PHARM REV CODE 636 W HCPCS: Performed by: STUDENT IN AN ORGANIZED HEALTH CARE EDUCATION/TRAINING PROGRAM

## 2021-04-20 PROCEDURE — 25000003 PHARM REV CODE 250: Performed by: STUDENT IN AN ORGANIZED HEALTH CARE EDUCATION/TRAINING PROGRAM

## 2021-04-20 PROCEDURE — 99233 SBSQ HOSP IP/OBS HIGH 50: CPT | Mod: ,,, | Performed by: PHYSICIAN ASSISTANT

## 2021-04-20 PROCEDURE — 25000003 PHARM REV CODE 250: Performed by: PHYSICIAN ASSISTANT

## 2021-04-20 PROCEDURE — 11000001 HC ACUTE MED/SURG PRIVATE ROOM

## 2021-04-20 PROCEDURE — S4991 NICOTINE PATCH NONLEGEND: HCPCS | Performed by: STUDENT IN AN ORGANIZED HEALTH CARE EDUCATION/TRAINING PROGRAM

## 2021-04-20 PROCEDURE — 84100 ASSAY OF PHOSPHORUS: CPT | Performed by: STUDENT IN AN ORGANIZED HEALTH CARE EDUCATION/TRAINING PROGRAM

## 2021-04-20 PROCEDURE — 80053 COMPREHEN METABOLIC PANEL: CPT | Performed by: PHYSICIAN ASSISTANT

## 2021-04-20 PROCEDURE — 99024 POSTOP FOLLOW-UP VISIT: CPT | Mod: ,,, | Performed by: PHYSICIAN ASSISTANT

## 2021-04-20 PROCEDURE — 85025 COMPLETE CBC W/AUTO DIFF WBC: CPT | Performed by: STUDENT IN AN ORGANIZED HEALTH CARE EDUCATION/TRAINING PROGRAM

## 2021-04-20 PROCEDURE — 99233 PR SUBSEQUENT HOSPITAL CARE,LEVL III: ICD-10-PCS | Mod: ,,, | Performed by: PHYSICIAN ASSISTANT

## 2021-04-20 PROCEDURE — 83735 ASSAY OF MAGNESIUM: CPT | Performed by: STUDENT IN AN ORGANIZED HEALTH CARE EDUCATION/TRAINING PROGRAM

## 2021-04-20 PROCEDURE — 99024 PR POST-OP FOLLOW-UP VISIT: ICD-10-PCS | Mod: ,,, | Performed by: PHYSICIAN ASSISTANT

## 2021-04-20 RX ORDER — PREGABALIN 50 MG/1
50 CAPSULE ORAL 2 TIMES DAILY
Status: DISCONTINUED | OUTPATIENT
Start: 2021-04-20 | End: 2021-04-27 | Stop reason: HOSPADM

## 2021-04-20 RX ORDER — ACETAMINOPHEN 500 MG
500 TABLET ORAL EVERY 6 HOURS PRN
Status: DISCONTINUED | OUTPATIENT
Start: 2021-04-20 | End: 2021-04-27 | Stop reason: HOSPADM

## 2021-04-20 RX ORDER — NALOXONE HCL 0.4 MG/ML
0.4 VIAL (ML) INJECTION
Status: DISCONTINUED | OUTPATIENT
Start: 2021-04-20 | End: 2021-04-27 | Stop reason: HOSPADM

## 2021-04-20 RX ADMIN — DRONABINOL 5 MG: 5 CAPSULE ORAL at 08:04

## 2021-04-20 RX ADMIN — ALUMINUM HYDROXIDE, MAGNESIUM HYDROXIDE, SIMETHICONE 30 ML: 400; 400; 40 SUSPENSION ORAL at 08:04

## 2021-04-20 RX ADMIN — CYCLOBENZAPRINE HYDROCHLORIDE 5 MG: 5 TABLET, FILM COATED ORAL at 08:04

## 2021-04-20 RX ADMIN — DRONABINOL 5 MG: 5 CAPSULE ORAL at 09:04

## 2021-04-20 RX ADMIN — SILODOSIN 4 MG: 4 CAPSULE ORAL at 08:04

## 2021-04-20 RX ADMIN — NICOTINE 1 PATCH: 7 PATCH TRANSDERMAL at 08:04

## 2021-04-20 RX ADMIN — POLYETHYLENE GLYCOL 3350 17 G: 17 POWDER, FOR SOLUTION ORAL at 08:04

## 2021-04-20 RX ADMIN — MUPIROCIN: 20 OINTMENT TOPICAL at 08:04

## 2021-04-20 RX ADMIN — PREGABALIN 50 MG: 50 CAPSULE ORAL at 08:04

## 2021-04-20 RX ADMIN — ACETAMINOPHEN 500 MG: 500 TABLET, FILM COATED ORAL at 06:04

## 2021-04-20 RX ADMIN — SUCRALFATE 1 G: 1 SUSPENSION ORAL at 11:04

## 2021-04-20 RX ADMIN — SUCRALFATE 1 G: 1 SUSPENSION ORAL at 06:04

## 2021-04-20 RX ADMIN — ALUMINUM HYDROXIDE, MAGNESIUM HYDROXIDE, SIMETHICONE 30 ML: 400; 400; 40 SUSPENSION ORAL at 06:04

## 2021-04-20 RX ADMIN — HEPARIN SODIUM 7500 UNITS: 5000 INJECTION INTRAVENOUS; SUBCUTANEOUS at 09:04

## 2021-04-20 RX ADMIN — CYCLOBENZAPRINE HYDROCHLORIDE 5 MG: 5 TABLET, FILM COATED ORAL at 03:04

## 2021-04-20 RX ADMIN — HYDROMORPHONE HYDROCHLORIDE 0.2 MG: 1 INJECTION, SOLUTION INTRAMUSCULAR; INTRAVENOUS; SUBCUTANEOUS at 03:04

## 2021-04-20 RX ADMIN — CEFTRIAXONE 2 G: 2 INJECTION, SOLUTION INTRAVENOUS at 06:04

## 2021-04-20 RX ADMIN — PREGABALIN 50 MG: 50 CAPSULE ORAL at 03:04

## 2021-04-20 RX ADMIN — OXYCODONE HYDROCHLORIDE 10 MG: 10 TABLET ORAL at 11:04

## 2021-04-20 RX ADMIN — SUCRALFATE 1 G: 1 SUSPENSION ORAL at 12:04

## 2021-04-20 RX ADMIN — DOCUSATE SODIUM 50MG AND SENNOSIDES 8.6MG 1 TABLET: 8.6; 5 TABLET, FILM COATED ORAL at 08:04

## 2021-04-20 RX ADMIN — ACETAMINOPHEN 500 MG: 500 TABLET, FILM COATED ORAL at 12:04

## 2021-04-20 RX ADMIN — HEPARIN SODIUM 7500 UNITS: 5000 INJECTION INTRAVENOUS; SUBCUTANEOUS at 06:04

## 2021-04-20 RX ADMIN — SUCRALFATE 1 G: 1 SUSPENSION ORAL at 05:04

## 2021-04-20 RX ADMIN — OXYCODONE HYDROCHLORIDE 10 MG: 10 TABLET ORAL at 08:04

## 2021-04-20 RX ADMIN — HEPARIN SODIUM 7500 UNITS: 5000 INJECTION INTRAVENOUS; SUBCUTANEOUS at 02:04

## 2021-04-20 RX ADMIN — ALUMINUM HYDROXIDE, MAGNESIUM HYDROXIDE, SIMETHICONE 30 ML: 400; 400; 40 SUSPENSION ORAL at 04:04

## 2021-04-20 RX ADMIN — ACETAMINOPHEN 500 MG: 500 TABLET, FILM COATED ORAL at 11:04

## 2021-04-21 LAB
ALBUMIN SERPL BCP-MCNC: 1.8 G/DL (ref 3.5–5.2)
ALP SERPL-CCNC: 101 U/L (ref 55–135)
ALT SERPL W/O P-5'-P-CCNC: 22 U/L (ref 10–44)
ANION GAP SERPL CALC-SCNC: 3 MMOL/L (ref 8–16)
AST SERPL-CCNC: 47 U/L (ref 10–40)
BACTERIA BLD CULT: NORMAL
BASOPHILS # BLD AUTO: 0.02 K/UL (ref 0–0.2)
BASOPHILS NFR BLD: 0.6 % (ref 0–1.9)
BILIRUB SERPL-MCNC: 0.9 MG/DL (ref 0.1–1)
BUN SERPL-MCNC: 12 MG/DL (ref 6–20)
CALCIUM SERPL-MCNC: 7.5 MG/DL (ref 8.7–10.5)
CHLORIDE SERPL-SCNC: 103 MMOL/L (ref 95–110)
CO2 SERPL-SCNC: 31 MMOL/L (ref 23–29)
CREAT SERPL-MCNC: 0.6 MG/DL (ref 0.5–1.4)
DIFFERENTIAL METHOD: ABNORMAL
EOSINOPHIL # BLD AUTO: 0.1 K/UL (ref 0–0.5)
EOSINOPHIL NFR BLD: 3.1 % (ref 0–8)
ERYTHROCYTE [DISTWIDTH] IN BLOOD BY AUTOMATED COUNT: 16.1 % (ref 11.5–14.5)
EST. GFR  (AFRICAN AMERICAN): >60 ML/MIN/1.73 M^2
EST. GFR  (NON AFRICAN AMERICAN): >60 ML/MIN/1.73 M^2
GLUCOSE SERPL-MCNC: 98 MG/DL (ref 70–110)
HCT VFR BLD AUTO: 27.1 % (ref 37–48.5)
HGB BLD-MCNC: 8.6 G/DL (ref 12–16)
IMM GRANULOCYTES # BLD AUTO: 0.02 K/UL (ref 0–0.04)
IMM GRANULOCYTES NFR BLD AUTO: 0.6 % (ref 0–0.5)
INR PPP: 1.1 (ref 0.8–1.2)
LYMPHOCYTES # BLD AUTO: 0.5 K/UL (ref 1–4.8)
LYMPHOCYTES NFR BLD: 15.8 % (ref 18–48)
MAGNESIUM SERPL-MCNC: 1.7 MG/DL (ref 1.6–2.6)
MCH RBC QN AUTO: 29.2 PG (ref 27–31)
MCHC RBC AUTO-ENTMCNC: 31.7 G/DL (ref 32–36)
MCV RBC AUTO: 92 FL (ref 82–98)
MONOCYTES # BLD AUTO: 0.3 K/UL (ref 0.3–1)
MONOCYTES NFR BLD: 9.3 % (ref 4–15)
NEUTROPHILS # BLD AUTO: 2.3 K/UL (ref 1.8–7.7)
NEUTROPHILS NFR BLD: 70.6 % (ref 38–73)
NRBC BLD-RTO: 0 /100 WBC
PHOSPHATE SERPL-MCNC: 3.2 MG/DL (ref 2.7–4.5)
PLATELET # BLD AUTO: 81 K/UL (ref 150–450)
PMV BLD AUTO: 9.2 FL (ref 9.2–12.9)
POTASSIUM SERPL-SCNC: 4.1 MMOL/L (ref 3.5–5.1)
PROT SERPL-MCNC: 5.8 G/DL (ref 6–8.4)
PROTHROMBIN TIME: 12 SEC (ref 9–12.5)
RBC # BLD AUTO: 2.95 M/UL (ref 4–5.4)
SODIUM SERPL-SCNC: 137 MMOL/L (ref 136–145)
WBC # BLD AUTO: 3.22 K/UL (ref 3.9–12.7)

## 2021-04-21 PROCEDURE — 99024 POSTOP FOLLOW-UP VISIT: CPT | Mod: ,,, | Performed by: PHYSICIAN ASSISTANT

## 2021-04-21 PROCEDURE — 63600175 PHARM REV CODE 636 W HCPCS: Performed by: STUDENT IN AN ORGANIZED HEALTH CARE EDUCATION/TRAINING PROGRAM

## 2021-04-21 PROCEDURE — 36415 COLL VENOUS BLD VENIPUNCTURE: CPT | Performed by: PHYSICIAN ASSISTANT

## 2021-04-21 PROCEDURE — 84100 ASSAY OF PHOSPHORUS: CPT | Performed by: STUDENT IN AN ORGANIZED HEALTH CARE EDUCATION/TRAINING PROGRAM

## 2021-04-21 PROCEDURE — 25000003 PHARM REV CODE 250: Performed by: PHYSICIAN ASSISTANT

## 2021-04-21 PROCEDURE — 94799 UNLISTED PULMONARY SVC/PX: CPT

## 2021-04-21 PROCEDURE — 97530 THERAPEUTIC ACTIVITIES: CPT

## 2021-04-21 PROCEDURE — 85610 PROTHROMBIN TIME: CPT | Performed by: PHYSICIAN ASSISTANT

## 2021-04-21 PROCEDURE — S4991 NICOTINE PATCH NONLEGEND: HCPCS | Performed by: STUDENT IN AN ORGANIZED HEALTH CARE EDUCATION/TRAINING PROGRAM

## 2021-04-21 PROCEDURE — 85025 COMPLETE CBC W/AUTO DIFF WBC: CPT | Performed by: STUDENT IN AN ORGANIZED HEALTH CARE EDUCATION/TRAINING PROGRAM

## 2021-04-21 PROCEDURE — 25000003 PHARM REV CODE 250: Performed by: STUDENT IN AN ORGANIZED HEALTH CARE EDUCATION/TRAINING PROGRAM

## 2021-04-21 PROCEDURE — 97535 SELF CARE MNGMENT TRAINING: CPT

## 2021-04-21 PROCEDURE — 99024 PR POST-OP FOLLOW-UP VISIT: ICD-10-PCS | Mod: ,,, | Performed by: PHYSICIAN ASSISTANT

## 2021-04-21 PROCEDURE — 83735 ASSAY OF MAGNESIUM: CPT | Performed by: STUDENT IN AN ORGANIZED HEALTH CARE EDUCATION/TRAINING PROGRAM

## 2021-04-21 PROCEDURE — 94761 N-INVAS EAR/PLS OXIMETRY MLT: CPT

## 2021-04-21 PROCEDURE — 80053 COMPREHEN METABOLIC PANEL: CPT | Performed by: PHYSICIAN ASSISTANT

## 2021-04-21 PROCEDURE — 11000001 HC ACUTE MED/SURG PRIVATE ROOM

## 2021-04-21 RX ORDER — OXYCODONE HYDROCHLORIDE 5 MG/1
5 TABLET ORAL EVERY 4 HOURS PRN
Status: DISCONTINUED | OUTPATIENT
Start: 2021-04-21 | End: 2021-04-27 | Stop reason: HOSPADM

## 2021-04-21 RX ORDER — OXYCODONE HYDROCHLORIDE 10 MG/1
10 TABLET ORAL EVERY 4 HOURS PRN
Status: DISCONTINUED | OUTPATIENT
Start: 2021-04-21 | End: 2021-04-27 | Stop reason: HOSPADM

## 2021-04-21 RX ADMIN — SUCRALFATE 1 G: 1 SUSPENSION ORAL at 12:04

## 2021-04-21 RX ADMIN — OXYCODONE HYDROCHLORIDE 10 MG: 10 TABLET ORAL at 02:04

## 2021-04-21 RX ADMIN — NICOTINE 1 PATCH: 7 PATCH TRANSDERMAL at 08:04

## 2021-04-21 RX ADMIN — SUCRALFATE 1 G: 1 SUSPENSION ORAL at 05:04

## 2021-04-21 RX ADMIN — DOCUSATE SODIUM 50MG AND SENNOSIDES 8.6MG 1 TABLET: 8.6; 5 TABLET, FILM COATED ORAL at 08:04

## 2021-04-21 RX ADMIN — ALUMINUM HYDROXIDE, MAGNESIUM HYDROXIDE, SIMETHICONE 30 ML: 400; 400; 40 SUSPENSION ORAL at 04:04

## 2021-04-21 RX ADMIN — OXYCODONE HYDROCHLORIDE 10 MG: 10 TABLET ORAL at 12:04

## 2021-04-21 RX ADMIN — HEPARIN SODIUM 7500 UNITS: 5000 INJECTION INTRAVENOUS; SUBCUTANEOUS at 02:04

## 2021-04-21 RX ADMIN — CYCLOBENZAPRINE HYDROCHLORIDE 5 MG: 5 TABLET, FILM COATED ORAL at 08:04

## 2021-04-21 RX ADMIN — PREGABALIN 50 MG: 50 CAPSULE ORAL at 08:04

## 2021-04-21 RX ADMIN — DRONABINOL 5 MG: 5 CAPSULE ORAL at 08:04

## 2021-04-21 RX ADMIN — ALUMINUM HYDROXIDE, MAGNESIUM HYDROXIDE, SIMETHICONE 30 ML: 400; 400; 40 SUSPENSION ORAL at 05:04

## 2021-04-21 RX ADMIN — HEPARIN SODIUM 7500 UNITS: 5000 INJECTION INTRAVENOUS; SUBCUTANEOUS at 05:04

## 2021-04-21 RX ADMIN — CEFTRIAXONE 2 G: 2 INJECTION, SOLUTION INTRAVENOUS at 05:04

## 2021-04-21 RX ADMIN — HEPARIN SODIUM 7500 UNITS: 5000 INJECTION INTRAVENOUS; SUBCUTANEOUS at 09:04

## 2021-04-21 RX ADMIN — OXYCODONE HYDROCHLORIDE 10 MG: 10 TABLET ORAL at 08:04

## 2021-04-21 RX ADMIN — SODIUM CHLORIDE: 0.9 INJECTION, SOLUTION INTRAVENOUS at 05:04

## 2021-04-21 RX ADMIN — CYCLOBENZAPRINE HYDROCHLORIDE 5 MG: 5 TABLET, FILM COATED ORAL at 02:04

## 2021-04-21 RX ADMIN — OXYCODONE 5 MG: 5 TABLET ORAL at 12:04

## 2021-04-21 RX ADMIN — MUPIROCIN: 20 OINTMENT TOPICAL at 08:04

## 2021-04-21 RX ADMIN — ALUMINUM HYDROXIDE, MAGNESIUM HYDROXIDE, SIMETHICONE 30 ML: 400; 400; 40 SUSPENSION ORAL at 08:04

## 2021-04-21 RX ADMIN — DRONABINOL 5 MG: 5 CAPSULE ORAL at 09:04

## 2021-04-21 RX ADMIN — SILODOSIN 4 MG: 4 CAPSULE ORAL at 08:04

## 2021-04-22 LAB
ALBUMIN SERPL BCP-MCNC: 1.8 G/DL (ref 3.5–5.2)
ALP SERPL-CCNC: 118 U/L (ref 55–135)
ALT SERPL W/O P-5'-P-CCNC: 26 U/L (ref 10–44)
ANION GAP SERPL CALC-SCNC: 0 MMOL/L (ref 8–16)
AST SERPL-CCNC: 54 U/L (ref 10–40)
BASOPHILS # BLD AUTO: 0.02 K/UL (ref 0–0.2)
BASOPHILS NFR BLD: 0.6 % (ref 0–1.9)
BILIRUB SERPL-MCNC: 0.7 MG/DL (ref 0.1–1)
BUN SERPL-MCNC: 11 MG/DL (ref 6–20)
CALCIUM SERPL-MCNC: 7.4 MG/DL (ref 8.7–10.5)
CHLORIDE SERPL-SCNC: 103 MMOL/L (ref 95–110)
CO2 SERPL-SCNC: 31 MMOL/L (ref 23–29)
CREAT SERPL-MCNC: 0.6 MG/DL (ref 0.5–1.4)
DIFFERENTIAL METHOD: ABNORMAL
EOSINOPHIL # BLD AUTO: 0.1 K/UL (ref 0–0.5)
EOSINOPHIL NFR BLD: 2 % (ref 0–8)
ERYTHROCYTE [DISTWIDTH] IN BLOOD BY AUTOMATED COUNT: 16.3 % (ref 11.5–14.5)
EST. GFR  (AFRICAN AMERICAN): >60 ML/MIN/1.73 M^2
EST. GFR  (NON AFRICAN AMERICAN): >60 ML/MIN/1.73 M^2
GLUCOSE SERPL-MCNC: 111 MG/DL (ref 70–110)
HCT VFR BLD AUTO: 26.6 % (ref 37–48.5)
HGB BLD-MCNC: 8.4 G/DL (ref 12–16)
IMM GRANULOCYTES # BLD AUTO: 0.03 K/UL (ref 0–0.04)
IMM GRANULOCYTES NFR BLD AUTO: 0.9 % (ref 0–0.5)
INR PPP: 1.1 (ref 0.8–1.2)
LYMPHOCYTES # BLD AUTO: 0.6 K/UL (ref 1–4.8)
LYMPHOCYTES NFR BLD: 17.8 % (ref 18–48)
MAGNESIUM SERPL-MCNC: 1.6 MG/DL (ref 1.6–2.6)
MCH RBC QN AUTO: 29.2 PG (ref 27–31)
MCHC RBC AUTO-ENTMCNC: 31.6 G/DL (ref 32–36)
MCV RBC AUTO: 92 FL (ref 82–98)
MONOCYTES # BLD AUTO: 0.4 K/UL (ref 0.3–1)
MONOCYTES NFR BLD: 10.8 % (ref 4–15)
NEUTROPHILS # BLD AUTO: 2.3 K/UL (ref 1.8–7.7)
NEUTROPHILS NFR BLD: 67.9 % (ref 38–73)
NRBC BLD-RTO: 0 /100 WBC
PHOSPHATE SERPL-MCNC: 2.7 MG/DL (ref 2.7–4.5)
PLATELET # BLD AUTO: 78 K/UL (ref 150–450)
PMV BLD AUTO: 9.1 FL (ref 9.2–12.9)
POTASSIUM SERPL-SCNC: 4 MMOL/L (ref 3.5–5.1)
PROT SERPL-MCNC: 5.5 G/DL (ref 6–8.4)
PROTHROMBIN TIME: 12.4 SEC (ref 9–12.5)
RBC # BLD AUTO: 2.88 M/UL (ref 4–5.4)
SARS-COV-2 RNA RESP QL NAA+PROBE: NOT DETECTED
SODIUM SERPL-SCNC: 134 MMOL/L (ref 136–145)
WBC # BLD AUTO: 3.42 K/UL (ref 3.9–12.7)

## 2021-04-22 PROCEDURE — 25000003 PHARM REV CODE 250: Performed by: PHYSICIAN ASSISTANT

## 2021-04-22 PROCEDURE — A4216 STERILE WATER/SALINE, 10 ML: HCPCS | Performed by: NEUROLOGICAL SURGERY

## 2021-04-22 PROCEDURE — 97530 THERAPEUTIC ACTIVITIES: CPT

## 2021-04-22 PROCEDURE — 25000003 PHARM REV CODE 250: Performed by: STUDENT IN AN ORGANIZED HEALTH CARE EDUCATION/TRAINING PROGRAM

## 2021-04-22 PROCEDURE — 85610 PROTHROMBIN TIME: CPT | Performed by: PHYSICIAN ASSISTANT

## 2021-04-22 PROCEDURE — 63600175 PHARM REV CODE 636 W HCPCS: Performed by: STUDENT IN AN ORGANIZED HEALTH CARE EDUCATION/TRAINING PROGRAM

## 2021-04-22 PROCEDURE — U0005 INFEC AGEN DETEC AMPLI PROBE: HCPCS | Performed by: NEUROLOGICAL SURGERY

## 2021-04-22 PROCEDURE — U0003 INFECTIOUS AGENT DETECTION BY NUCLEIC ACID (DNA OR RNA); SEVERE ACUTE RESPIRATORY SYNDROME CORONAVIRUS 2 (SARS-COV-2) (CORONAVIRUS DISEASE [COVID-19]), AMPLIFIED PROBE TECHNIQUE, MAKING USE OF HIGH THROUGHPUT TECHNOLOGIES AS DESCRIBED BY CMS-2020-01-R: HCPCS | Performed by: NEUROLOGICAL SURGERY

## 2021-04-22 PROCEDURE — 80053 COMPREHEN METABOLIC PANEL: CPT | Performed by: PHYSICIAN ASSISTANT

## 2021-04-22 PROCEDURE — 99024 POSTOP FOLLOW-UP VISIT: CPT | Mod: ,,, | Performed by: PHYSICIAN ASSISTANT

## 2021-04-22 PROCEDURE — S4991 NICOTINE PATCH NONLEGEND: HCPCS | Performed by: STUDENT IN AN ORGANIZED HEALTH CARE EDUCATION/TRAINING PROGRAM

## 2021-04-22 PROCEDURE — 25000003 PHARM REV CODE 250: Performed by: NEUROLOGICAL SURGERY

## 2021-04-22 PROCEDURE — 11000001 HC ACUTE MED/SURG PRIVATE ROOM

## 2021-04-22 PROCEDURE — 36573 INSJ PICC RS&I 5 YR+: CPT

## 2021-04-22 PROCEDURE — C1751 CATH, INF, PER/CENT/MIDLINE: HCPCS

## 2021-04-22 PROCEDURE — 76937 US GUIDE VASCULAR ACCESS: CPT

## 2021-04-22 PROCEDURE — 84100 ASSAY OF PHOSPHORUS: CPT | Performed by: STUDENT IN AN ORGANIZED HEALTH CARE EDUCATION/TRAINING PROGRAM

## 2021-04-22 PROCEDURE — 83735 ASSAY OF MAGNESIUM: CPT | Performed by: STUDENT IN AN ORGANIZED HEALTH CARE EDUCATION/TRAINING PROGRAM

## 2021-04-22 PROCEDURE — 85025 COMPLETE CBC W/AUTO DIFF WBC: CPT | Performed by: STUDENT IN AN ORGANIZED HEALTH CARE EDUCATION/TRAINING PROGRAM

## 2021-04-22 PROCEDURE — 97535 SELF CARE MNGMENT TRAINING: CPT

## 2021-04-22 PROCEDURE — 99024 PR POST-OP FOLLOW-UP VISIT: ICD-10-PCS | Mod: ,,, | Performed by: PHYSICIAN ASSISTANT

## 2021-04-22 RX ORDER — SODIUM CHLORIDE 0.9 % (FLUSH) 0.9 %
10 SYRINGE (ML) INJECTION EVERY 6 HOURS
Status: DISCONTINUED | OUTPATIENT
Start: 2021-04-22 | End: 2021-04-27 | Stop reason: HOSPADM

## 2021-04-22 RX ORDER — BACITRACIN ZINC 500 [USP'U]/G
OINTMENT TOPICAL 2 TIMES DAILY
Status: DISCONTINUED | OUTPATIENT
Start: 2021-04-22 | End: 2021-04-27 | Stop reason: HOSPADM

## 2021-04-22 RX ORDER — SODIUM CHLORIDE 0.9 % (FLUSH) 0.9 %
10 SYRINGE (ML) INJECTION
Status: DISCONTINUED | OUTPATIENT
Start: 2021-04-22 | End: 2021-04-27 | Stop reason: HOSPADM

## 2021-04-22 RX ADMIN — SODIUM CHLORIDE: 0.9 INJECTION, SOLUTION INTRAVENOUS at 05:04

## 2021-04-22 RX ADMIN — CYCLOBENZAPRINE HYDROCHLORIDE 5 MG: 5 TABLET, FILM COATED ORAL at 09:04

## 2021-04-22 RX ADMIN — SUCRALFATE 1 G: 1 SUSPENSION ORAL at 05:04

## 2021-04-22 RX ADMIN — Medication 10 ML: at 05:04

## 2021-04-22 RX ADMIN — CEFTRIAXONE 2 G: 2 INJECTION, SOLUTION INTRAVENOUS at 05:04

## 2021-04-22 RX ADMIN — CYCLOBENZAPRINE HYDROCHLORIDE 5 MG: 5 TABLET, FILM COATED ORAL at 03:04

## 2021-04-22 RX ADMIN — PREGABALIN 50 MG: 50 CAPSULE ORAL at 09:04

## 2021-04-22 RX ADMIN — HEPARIN SODIUM 7500 UNITS: 5000 INJECTION INTRAVENOUS; SUBCUTANEOUS at 09:04

## 2021-04-22 RX ADMIN — DOCUSATE SODIUM 50MG AND SENNOSIDES 8.6MG 1 TABLET: 8.6; 5 TABLET, FILM COATED ORAL at 09:04

## 2021-04-22 RX ADMIN — OXYCODONE HYDROCHLORIDE 10 MG: 10 TABLET ORAL at 03:04

## 2021-04-22 RX ADMIN — OXYCODONE HYDROCHLORIDE 10 MG: 10 TABLET ORAL at 09:04

## 2021-04-22 RX ADMIN — HEPARIN SODIUM 7500 UNITS: 5000 INJECTION INTRAVENOUS; SUBCUTANEOUS at 02:04

## 2021-04-22 RX ADMIN — ALUMINUM HYDROXIDE, MAGNESIUM HYDROXIDE, SIMETHICONE 30 ML: 400; 400; 40 SUSPENSION ORAL at 04:04

## 2021-04-22 RX ADMIN — OXYCODONE HYDROCHLORIDE 10 MG: 10 TABLET ORAL at 12:04

## 2021-04-22 RX ADMIN — DRONABINOL 5 MG: 5 CAPSULE ORAL at 10:04

## 2021-04-22 RX ADMIN — OXYCODONE HYDROCHLORIDE 10 MG: 10 TABLET ORAL at 10:04

## 2021-04-22 RX ADMIN — SILODOSIN 4 MG: 4 CAPSULE ORAL at 09:04

## 2021-04-22 RX ADMIN — POLYETHYLENE GLYCOL 3350 17 G: 17 POWDER, FOR SOLUTION ORAL at 09:04

## 2021-04-22 RX ADMIN — ALUMINUM HYDROXIDE, MAGNESIUM HYDROXIDE, SIMETHICONE 30 ML: 400; 400; 40 SUSPENSION ORAL at 10:04

## 2021-04-22 RX ADMIN — OXYCODONE HYDROCHLORIDE 10 MG: 10 TABLET ORAL at 06:04

## 2021-04-22 RX ADMIN — NICOTINE 1 PATCH: 7 PATCH TRANSDERMAL at 09:04

## 2021-04-22 RX ADMIN — ALUMINUM HYDROXIDE, MAGNESIUM HYDROXIDE, SIMETHICONE 30 ML: 400; 400; 40 SUSPENSION ORAL at 09:04

## 2021-04-22 RX ADMIN — BACITRACIN 1 EACH: 500 OINTMENT TOPICAL at 12:04

## 2021-04-22 RX ADMIN — BACITRACIN 1 EACH: 500 OINTMENT TOPICAL at 09:04

## 2021-04-22 RX ADMIN — ALUMINUM HYDROXIDE, MAGNESIUM HYDROXIDE, SIMETHICONE 30 ML: 400; 400; 40 SUSPENSION ORAL at 05:04

## 2021-04-22 RX ADMIN — SUCRALFATE 1 G: 1 SUSPENSION ORAL at 12:04

## 2021-04-22 RX ADMIN — DRONABINOL 5 MG: 5 CAPSULE ORAL at 09:04

## 2021-04-22 RX ADMIN — ACETAMINOPHEN 500 MG: 500 TABLET, FILM COATED ORAL at 05:04

## 2021-04-22 RX ADMIN — HEPARIN SODIUM 7500 UNITS: 5000 INJECTION INTRAVENOUS; SUBCUTANEOUS at 05:04

## 2021-04-23 ENCOUNTER — TELEPHONE (OUTPATIENT)
Dept: NEUROSURGERY | Facility: CLINIC | Age: 41
End: 2021-04-23

## 2021-04-23 DIAGNOSIS — M43.27 FUSION OF SPINE, LUMBOSACRAL REGION: ICD-10-CM

## 2021-04-23 LAB
ALBUMIN SERPL BCP-MCNC: 1.9 G/DL (ref 3.5–5.2)
ALP SERPL-CCNC: 110 U/L (ref 55–135)
ALT SERPL W/O P-5'-P-CCNC: 31 U/L (ref 10–44)
ANION GAP SERPL CALC-SCNC: 5 MMOL/L (ref 8–16)
AST SERPL-CCNC: 65 U/L (ref 10–40)
BACTERIA SPEC ANAEROBE CULT: NORMAL
BASOPHILS # BLD AUTO: 0.01 K/UL (ref 0–0.2)
BASOPHILS NFR BLD: 0.3 % (ref 0–1.9)
BILIRUB SERPL-MCNC: 1.5 MG/DL (ref 0.1–1)
BLD PROD TYP BPU: NORMAL
BLOOD UNIT EXPIRATION DATE: NORMAL
BLOOD UNIT TYPE CODE: 6200
BLOOD UNIT TYPE: NORMAL
BUN SERPL-MCNC: 11 MG/DL (ref 6–20)
CALCIUM SERPL-MCNC: 7.7 MG/DL (ref 8.7–10.5)
CHLORIDE SERPL-SCNC: 104 MMOL/L (ref 95–110)
CO2 SERPL-SCNC: 27 MMOL/L (ref 23–29)
CODING SYSTEM: NORMAL
CREAT SERPL-MCNC: 0.6 MG/DL (ref 0.5–1.4)
DIFFERENTIAL METHOD: ABNORMAL
DISPENSE STATUS: NORMAL
EOSINOPHIL # BLD AUTO: 0.1 K/UL (ref 0–0.5)
EOSINOPHIL NFR BLD: 2.3 % (ref 0–8)
ERYTHROCYTE [DISTWIDTH] IN BLOOD BY AUTOMATED COUNT: 16.3 % (ref 11.5–14.5)
EST. GFR  (AFRICAN AMERICAN): >60 ML/MIN/1.73 M^2
EST. GFR  (NON AFRICAN AMERICAN): >60 ML/MIN/1.73 M^2
GLUCOSE SERPL-MCNC: 94 MG/DL (ref 70–110)
HCT VFR BLD AUTO: 28.6 % (ref 37–48.5)
HGB BLD-MCNC: 9.1 G/DL (ref 12–16)
IMM GRANULOCYTES # BLD AUTO: 0.02 K/UL (ref 0–0.04)
IMM GRANULOCYTES NFR BLD AUTO: 0.5 % (ref 0–0.5)
INR PPP: 1.1 (ref 0.8–1.2)
LYMPHOCYTES # BLD AUTO: 0.5 K/UL (ref 1–4.8)
LYMPHOCYTES NFR BLD: 13.7 % (ref 18–48)
MAGNESIUM SERPL-MCNC: 1.7 MG/DL (ref 1.6–2.6)
MCH RBC QN AUTO: 29.8 PG (ref 27–31)
MCHC RBC AUTO-ENTMCNC: 31.8 G/DL (ref 32–36)
MCV RBC AUTO: 94 FL (ref 82–98)
MONOCYTES # BLD AUTO: 0.4 K/UL (ref 0.3–1)
MONOCYTES NFR BLD: 9.9 % (ref 4–15)
NEUTROPHILS # BLD AUTO: 2.9 K/UL (ref 1.8–7.7)
NEUTROPHILS NFR BLD: 73.3 % (ref 38–73)
NRBC BLD-RTO: 0 /100 WBC
PHOSPHATE SERPL-MCNC: 3 MG/DL (ref 2.7–4.5)
PLATELET # BLD AUTO: 83 K/UL (ref 150–450)
PMV BLD AUTO: 9.4 FL (ref 9.2–12.9)
POCT GLUCOSE: 134 MG/DL (ref 70–110)
POTASSIUM SERPL-SCNC: 4.2 MMOL/L (ref 3.5–5.1)
PROT SERPL-MCNC: 6.1 G/DL (ref 6–8.4)
PROTHROMBIN TIME: 12.3 SEC (ref 9–12.5)
RBC # BLD AUTO: 3.05 M/UL (ref 4–5.4)
SODIUM SERPL-SCNC: 136 MMOL/L (ref 136–145)
TRANS PLATPHERESIS VOL PATIENT: NORMAL ML
WBC # BLD AUTO: 3.94 K/UL (ref 3.9–12.7)

## 2021-04-23 PROCEDURE — A4216 STERILE WATER/SALINE, 10 ML: HCPCS | Performed by: NEUROLOGICAL SURGERY

## 2021-04-23 PROCEDURE — 99900035 HC TECH TIME PER 15 MIN (STAT)

## 2021-04-23 PROCEDURE — 25000003 PHARM REV CODE 250: Performed by: STUDENT IN AN ORGANIZED HEALTH CARE EDUCATION/TRAINING PROGRAM

## 2021-04-23 PROCEDURE — S4991 NICOTINE PATCH NONLEGEND: HCPCS | Performed by: STUDENT IN AN ORGANIZED HEALTH CARE EDUCATION/TRAINING PROGRAM

## 2021-04-23 PROCEDURE — 99024 POSTOP FOLLOW-UP VISIT: CPT | Mod: ,,, | Performed by: PHYSICIAN ASSISTANT

## 2021-04-23 PROCEDURE — 11000001 HC ACUTE MED/SURG PRIVATE ROOM

## 2021-04-23 PROCEDURE — P9035 PLATELET PHERES LEUKOREDUCED: HCPCS | Performed by: PHYSICIAN ASSISTANT

## 2021-04-23 PROCEDURE — 85025 COMPLETE CBC W/AUTO DIFF WBC: CPT | Performed by: STUDENT IN AN ORGANIZED HEALTH CARE EDUCATION/TRAINING PROGRAM

## 2021-04-23 PROCEDURE — 36430 TRANSFUSION BLD/BLD COMPNT: CPT

## 2021-04-23 PROCEDURE — 80053 COMPREHEN METABOLIC PANEL: CPT | Performed by: PHYSICIAN ASSISTANT

## 2021-04-23 PROCEDURE — 99024 PR POST-OP FOLLOW-UP VISIT: ICD-10-PCS | Mod: ,,, | Performed by: PHYSICIAN ASSISTANT

## 2021-04-23 PROCEDURE — 94799 UNLISTED PULMONARY SVC/PX: CPT

## 2021-04-23 PROCEDURE — 94761 N-INVAS EAR/PLS OXIMETRY MLT: CPT

## 2021-04-23 PROCEDURE — 84100 ASSAY OF PHOSPHORUS: CPT | Performed by: STUDENT IN AN ORGANIZED HEALTH CARE EDUCATION/TRAINING PROGRAM

## 2021-04-23 PROCEDURE — 25000003 PHARM REV CODE 250: Performed by: NEUROLOGICAL SURGERY

## 2021-04-23 PROCEDURE — 25000003 PHARM REV CODE 250: Performed by: PHYSICIAN ASSISTANT

## 2021-04-23 PROCEDURE — 63600175 PHARM REV CODE 636 W HCPCS: Performed by: STUDENT IN AN ORGANIZED HEALTH CARE EDUCATION/TRAINING PROGRAM

## 2021-04-23 PROCEDURE — 85610 PROTHROMBIN TIME: CPT | Performed by: PHYSICIAN ASSISTANT

## 2021-04-23 PROCEDURE — 83735 ASSAY OF MAGNESIUM: CPT | Performed by: STUDENT IN AN ORGANIZED HEALTH CARE EDUCATION/TRAINING PROGRAM

## 2021-04-23 RX ORDER — PREGABALIN 50 MG/1
50 CAPSULE ORAL 2 TIMES DAILY
Qty: 60 CAPSULE | Refills: 6 | Status: SHIPPED | OUTPATIENT
Start: 2021-04-23 | End: 2021-05-17

## 2021-04-23 RX ORDER — CYCLOBENZAPRINE HCL 5 MG
5 TABLET ORAL 3 TIMES DAILY PRN
Qty: 60 TABLET | Refills: 0 | Status: SHIPPED | OUTPATIENT
Start: 2021-04-23 | End: 2021-04-27

## 2021-04-23 RX ORDER — ACETAMINOPHEN 500 MG
500 TABLET ORAL EVERY 6 HOURS PRN
Refills: 0 | COMMUNITY
Start: 2021-04-23 | End: 2021-04-27

## 2021-04-23 RX ORDER — HYDROCODONE BITARTRATE AND ACETAMINOPHEN 500; 5 MG/1; MG/1
TABLET ORAL
Status: DISCONTINUED | OUTPATIENT
Start: 2021-04-23 | End: 2021-04-26

## 2021-04-23 RX ORDER — BACITRACIN ZINC 500 [USP'U]/G
OINTMENT TOPICAL 2 TIMES DAILY
COMMUNITY
Start: 2021-04-23 | End: 2022-01-26

## 2021-04-23 RX ORDER — OXYCODONE HYDROCHLORIDE 10 MG/1
10 TABLET ORAL EVERY 4 HOURS PRN
Qty: 60 TABLET | Refills: 0 | Status: ON HOLD | OUTPATIENT
Start: 2021-04-23 | End: 2021-05-07 | Stop reason: HOSPADM

## 2021-04-23 RX ORDER — TAMSULOSIN HYDROCHLORIDE 0.4 MG/1
0.4 CAPSULE ORAL DAILY
Qty: 30 CAPSULE | Refills: 0 | Status: SHIPPED | OUTPATIENT
Start: 2021-04-24 | End: 2021-05-24

## 2021-04-23 RX ORDER — SILODOSIN 4 MG/1
4 CAPSULE ORAL DAILY
Qty: 14 CAPSULE | Refills: 0 | Status: SHIPPED | OUTPATIENT
Start: 2021-04-23 | End: 2021-04-23 | Stop reason: HOSPADM

## 2021-04-23 RX ORDER — TAMSULOSIN HYDROCHLORIDE 0.4 MG/1
0.4 CAPSULE ORAL DAILY
Status: DISCONTINUED | OUTPATIENT
Start: 2021-04-24 | End: 2021-04-27 | Stop reason: HOSPADM

## 2021-04-23 RX ORDER — AMOXICILLIN 250 MG
1 CAPSULE ORAL DAILY
COMMUNITY
Start: 2021-04-23 | End: 2022-01-27

## 2021-04-23 RX ADMIN — ALUMINUM HYDROXIDE, MAGNESIUM HYDROXIDE, SIMETHICONE 30 ML: 400; 400; 40 SUSPENSION ORAL at 04:04

## 2021-04-23 RX ADMIN — CYCLOBENZAPRINE HYDROCHLORIDE 5 MG: 5 TABLET, FILM COATED ORAL at 08:04

## 2021-04-23 RX ADMIN — DOCUSATE SODIUM 50MG AND SENNOSIDES 8.6MG 1 TABLET: 8.6; 5 TABLET, FILM COATED ORAL at 08:04

## 2021-04-23 RX ADMIN — NICOTINE 1 PATCH: 7 PATCH TRANSDERMAL at 08:04

## 2021-04-23 RX ADMIN — ALUMINUM HYDROXIDE, MAGNESIUM HYDROXIDE, SIMETHICONE 30 ML: 400; 400; 40 SUSPENSION ORAL at 08:04

## 2021-04-23 RX ADMIN — BACITRACIN 1 EACH: 500 OINTMENT TOPICAL at 08:04

## 2021-04-23 RX ADMIN — Medication 10 ML: at 11:04

## 2021-04-23 RX ADMIN — OXYCODONE HYDROCHLORIDE 10 MG: 10 TABLET ORAL at 08:04

## 2021-04-23 RX ADMIN — HEPARIN SODIUM 7500 UNITS: 5000 INJECTION INTRAVENOUS; SUBCUTANEOUS at 02:04

## 2021-04-23 RX ADMIN — PREGABALIN 50 MG: 50 CAPSULE ORAL at 08:04

## 2021-04-23 RX ADMIN — SUCRALFATE 1 G: 1 SUSPENSION ORAL at 06:04

## 2021-04-23 RX ADMIN — ALUMINUM HYDROXIDE, MAGNESIUM HYDROXIDE, SIMETHICONE 30 ML: 400; 400; 40 SUSPENSION ORAL at 06:04

## 2021-04-23 RX ADMIN — SUCRALFATE 1 G: 1 SUSPENSION ORAL at 11:04

## 2021-04-23 RX ADMIN — ALUMINUM HYDROXIDE, MAGNESIUM HYDROXIDE, SIMETHICONE 30 ML: 400; 400; 40 SUSPENSION ORAL at 11:04

## 2021-04-23 RX ADMIN — Medication 10 ML: at 06:04

## 2021-04-23 RX ADMIN — DRONABINOL 5 MG: 5 CAPSULE ORAL at 08:04

## 2021-04-23 RX ADMIN — HEPARIN SODIUM 7500 UNITS: 5000 INJECTION INTRAVENOUS; SUBCUTANEOUS at 09:04

## 2021-04-23 RX ADMIN — CYCLOBENZAPRINE HYDROCHLORIDE 5 MG: 5 TABLET, FILM COATED ORAL at 02:04

## 2021-04-23 RX ADMIN — OXYCODONE HYDROCHLORIDE 10 MG: 10 TABLET ORAL at 04:04

## 2021-04-23 RX ADMIN — HEPARIN SODIUM 7500 UNITS: 5000 INJECTION INTRAVENOUS; SUBCUTANEOUS at 06:04

## 2021-04-23 RX ADMIN — OXYCODONE HYDROCHLORIDE 10 MG: 10 TABLET ORAL at 09:04

## 2021-04-23 RX ADMIN — SILODOSIN 4 MG: 4 CAPSULE ORAL at 08:04

## 2021-04-23 RX ADMIN — CEFTRIAXONE 2 G: 2 INJECTION, SOLUTION INTRAVENOUS at 06:04

## 2021-04-24 LAB
ABO + RH BLD: NORMAL
ALBUMIN SERPL BCP-MCNC: 1.9 G/DL (ref 3.5–5.2)
ALP SERPL-CCNC: 114 U/L (ref 55–135)
ALT SERPL W/O P-5'-P-CCNC: 33 U/L (ref 10–44)
ANION GAP SERPL CALC-SCNC: 3 MMOL/L (ref 8–16)
AST SERPL-CCNC: 66 U/L (ref 10–40)
BASOPHILS # BLD AUTO: 0 K/UL (ref 0–0.2)
BASOPHILS NFR BLD: 0 % (ref 0–1.9)
BILIRUB SERPL-MCNC: 1.1 MG/DL (ref 0.1–1)
BLD GP AB SCN CELLS X3 SERPL QL: NORMAL
BLD PROD TYP BPU: NORMAL
BLD PROD TYP BPU: NORMAL
BLOOD UNIT EXPIRATION DATE: NORMAL
BLOOD UNIT EXPIRATION DATE: NORMAL
BLOOD UNIT TYPE CODE: 6200
BLOOD UNIT TYPE CODE: 6200
BLOOD UNIT TYPE: NORMAL
BLOOD UNIT TYPE: NORMAL
BUN SERPL-MCNC: 11 MG/DL (ref 6–20)
CALCIUM SERPL-MCNC: 7.5 MG/DL (ref 8.7–10.5)
CHLORIDE SERPL-SCNC: 102 MMOL/L (ref 95–110)
CO2 SERPL-SCNC: 29 MMOL/L (ref 23–29)
CODING SYSTEM: NORMAL
CODING SYSTEM: NORMAL
CREAT SERPL-MCNC: 0.6 MG/DL (ref 0.5–1.4)
DIFFERENTIAL METHOD: ABNORMAL
DISPENSE STATUS: NORMAL
DISPENSE STATUS: NORMAL
EOSINOPHIL # BLD AUTO: 0.1 K/UL (ref 0–0.5)
EOSINOPHIL NFR BLD: 2.9 % (ref 0–8)
ERYTHROCYTE [DISTWIDTH] IN BLOOD BY AUTOMATED COUNT: 16.3 % (ref 11.5–14.5)
EST. GFR  (AFRICAN AMERICAN): >60 ML/MIN/1.73 M^2
EST. GFR  (NON AFRICAN AMERICAN): >60 ML/MIN/1.73 M^2
FIBRINOGEN PPP-MCNC: 238 MG/DL (ref 182–366)
GLUCOSE SERPL-MCNC: 100 MG/DL (ref 70–110)
HAPTOGLOB SERPL-MCNC: 35 MG/DL (ref 30–250)
HCT VFR BLD AUTO: 21.9 % (ref 37–48.5)
HGB BLD-MCNC: 6.9 G/DL (ref 12–16)
IMM GRANULOCYTES # BLD AUTO: 0.01 K/UL (ref 0–0.04)
IMM GRANULOCYTES NFR BLD AUTO: 0.4 % (ref 0–0.5)
INR PPP: 1.1 (ref 0.8–1.2)
LDH SERPL L TO P-CCNC: 250 U/L (ref 110–260)
LYMPHOCYTES # BLD AUTO: 0.5 K/UL (ref 1–4.8)
LYMPHOCYTES NFR BLD: 19.3 % (ref 18–48)
MAGNESIUM SERPL-MCNC: 1.7 MG/DL (ref 1.6–2.6)
MCH RBC QN AUTO: 29.7 PG (ref 27–31)
MCHC RBC AUTO-ENTMCNC: 31.5 G/DL (ref 32–36)
MCV RBC AUTO: 94 FL (ref 82–98)
MONOCYTES # BLD AUTO: 0.4 K/UL (ref 0.3–1)
MONOCYTES NFR BLD: 12.9 % (ref 4–15)
NEUTROPHILS # BLD AUTO: 1.8 K/UL (ref 1.8–7.7)
NEUTROPHILS NFR BLD: 64.5 % (ref 38–73)
NRBC BLD-RTO: 0 /100 WBC
PHOSPHATE SERPL-MCNC: 2.9 MG/DL (ref 2.7–4.5)
PLATELET # BLD AUTO: 85 K/UL (ref 150–450)
PMV BLD AUTO: 9.6 FL (ref 9.2–12.9)
POTASSIUM SERPL-SCNC: 3.9 MMOL/L (ref 3.5–5.1)
PROT SERPL-MCNC: 5.9 G/DL (ref 6–8.4)
PROTHROMBIN TIME: 12.1 SEC (ref 9–12.5)
RBC # BLD AUTO: 2.32 M/UL (ref 4–5.4)
SODIUM SERPL-SCNC: 134 MMOL/L (ref 136–145)
TRANS ERYTHROCYTES VOL PATIENT: NORMAL ML
TRANS ERYTHROCYTES VOL PATIENT: NORMAL ML
WBC # BLD AUTO: 2.8 K/UL (ref 3.9–12.7)

## 2021-04-24 PROCEDURE — 86850 RBC ANTIBODY SCREEN: CPT | Performed by: PHYSICIAN ASSISTANT

## 2021-04-24 PROCEDURE — 84100 ASSAY OF PHOSPHORUS: CPT | Performed by: STUDENT IN AN ORGANIZED HEALTH CARE EDUCATION/TRAINING PROGRAM

## 2021-04-24 PROCEDURE — 63600175 PHARM REV CODE 636 W HCPCS: Performed by: STUDENT IN AN ORGANIZED HEALTH CARE EDUCATION/TRAINING PROGRAM

## 2021-04-24 PROCEDURE — 83735 ASSAY OF MAGNESIUM: CPT | Performed by: STUDENT IN AN ORGANIZED HEALTH CARE EDUCATION/TRAINING PROGRAM

## 2021-04-24 PROCEDURE — 85610 PROTHROMBIN TIME: CPT | Performed by: PHYSICIAN ASSISTANT

## 2021-04-24 PROCEDURE — 25000003 PHARM REV CODE 250: Performed by: STUDENT IN AN ORGANIZED HEALTH CARE EDUCATION/TRAINING PROGRAM

## 2021-04-24 PROCEDURE — 83615 LACTATE (LD) (LDH) ENZYME: CPT | Performed by: STUDENT IN AN ORGANIZED HEALTH CARE EDUCATION/TRAINING PROGRAM

## 2021-04-24 PROCEDURE — 80053 COMPREHEN METABOLIC PANEL: CPT | Performed by: PHYSICIAN ASSISTANT

## 2021-04-24 PROCEDURE — 99900035 HC TECH TIME PER 15 MIN (STAT)

## 2021-04-24 PROCEDURE — 36415 COLL VENOUS BLD VENIPUNCTURE: CPT | Performed by: STUDENT IN AN ORGANIZED HEALTH CARE EDUCATION/TRAINING PROGRAM

## 2021-04-24 PROCEDURE — S4991 NICOTINE PATCH NONLEGEND: HCPCS | Performed by: STUDENT IN AN ORGANIZED HEALTH CARE EDUCATION/TRAINING PROGRAM

## 2021-04-24 PROCEDURE — 25000003 PHARM REV CODE 250: Performed by: NEUROLOGICAL SURGERY

## 2021-04-24 PROCEDURE — 99024 PR POST-OP FOLLOW-UP VISIT: ICD-10-PCS | Mod: ,,, | Performed by: PHYSICIAN ASSISTANT

## 2021-04-24 PROCEDURE — 36430 TRANSFUSION BLD/BLD COMPNT: CPT

## 2021-04-24 PROCEDURE — P9021 RED BLOOD CELLS UNIT: HCPCS | Performed by: PHYSICIAN ASSISTANT

## 2021-04-24 PROCEDURE — 25000003 PHARM REV CODE 250: Performed by: PHYSICIAN ASSISTANT

## 2021-04-24 PROCEDURE — 11000001 HC ACUTE MED/SURG PRIVATE ROOM

## 2021-04-24 PROCEDURE — 85025 COMPLETE CBC W/AUTO DIFF WBC: CPT | Performed by: STUDENT IN AN ORGANIZED HEALTH CARE EDUCATION/TRAINING PROGRAM

## 2021-04-24 PROCEDURE — 94799 UNLISTED PULMONARY SVC/PX: CPT

## 2021-04-24 PROCEDURE — 85384 FIBRINOGEN ACTIVITY: CPT | Performed by: STUDENT IN AN ORGANIZED HEALTH CARE EDUCATION/TRAINING PROGRAM

## 2021-04-24 PROCEDURE — 86920 COMPATIBILITY TEST SPIN: CPT | Performed by: PHYSICIAN ASSISTANT

## 2021-04-24 PROCEDURE — 83010 ASSAY OF HAPTOGLOBIN QUANT: CPT | Performed by: STUDENT IN AN ORGANIZED HEALTH CARE EDUCATION/TRAINING PROGRAM

## 2021-04-24 PROCEDURE — 99024 POSTOP FOLLOW-UP VISIT: CPT | Mod: ,,, | Performed by: PHYSICIAN ASSISTANT

## 2021-04-24 PROCEDURE — 86900 BLOOD TYPING SEROLOGIC ABO: CPT | Performed by: PHYSICIAN ASSISTANT

## 2021-04-24 PROCEDURE — A4216 STERILE WATER/SALINE, 10 ML: HCPCS | Performed by: NEUROLOGICAL SURGERY

## 2021-04-24 RX ORDER — HYDROCODONE BITARTRATE AND ACETAMINOPHEN 500; 5 MG/1; MG/1
TABLET ORAL
Status: DISCONTINUED | OUTPATIENT
Start: 2021-04-24 | End: 2021-04-26

## 2021-04-24 RX ADMIN — SUCRALFATE 1 G: 1 SUSPENSION ORAL at 06:04

## 2021-04-24 RX ADMIN — DOCUSATE SODIUM 50MG AND SENNOSIDES 8.6MG 1 TABLET: 8.6; 5 TABLET, FILM COATED ORAL at 09:04

## 2021-04-24 RX ADMIN — HEPARIN SODIUM 7500 UNITS: 5000 INJECTION INTRAVENOUS; SUBCUTANEOUS at 02:04

## 2021-04-24 RX ADMIN — ALUMINUM HYDROXIDE, MAGNESIUM HYDROXIDE, SIMETHICONE 30 ML: 400; 400; 40 SUSPENSION ORAL at 06:04

## 2021-04-24 RX ADMIN — ALUMINUM HYDROXIDE, MAGNESIUM HYDROXIDE, SIMETHICONE 30 ML: 400; 400; 40 SUSPENSION ORAL at 05:04

## 2021-04-24 RX ADMIN — NICOTINE 1 PATCH: 7 PATCH TRANSDERMAL at 09:04

## 2021-04-24 RX ADMIN — OXYCODONE HYDROCHLORIDE 10 MG: 10 TABLET ORAL at 12:04

## 2021-04-24 RX ADMIN — DRONABINOL 5 MG: 5 CAPSULE ORAL at 09:04

## 2021-04-24 RX ADMIN — OXYCODONE HYDROCHLORIDE 10 MG: 10 TABLET ORAL at 05:04

## 2021-04-24 RX ADMIN — Medication 10 ML: at 12:04

## 2021-04-24 RX ADMIN — ALUMINUM HYDROXIDE, MAGNESIUM HYDROXIDE, SIMETHICONE 30 ML: 400; 400; 40 SUSPENSION ORAL at 09:04

## 2021-04-24 RX ADMIN — TAMSULOSIN HYDROCHLORIDE 0.4 MG: 0.4 CAPSULE ORAL at 09:04

## 2021-04-24 RX ADMIN — Medication 10 ML: at 05:04

## 2021-04-24 RX ADMIN — SUCRALFATE 1 G: 1 SUSPENSION ORAL at 12:04

## 2021-04-24 RX ADMIN — CYCLOBENZAPRINE HYDROCHLORIDE 5 MG: 5 TABLET, FILM COATED ORAL at 03:04

## 2021-04-24 RX ADMIN — CYCLOBENZAPRINE HYDROCHLORIDE 5 MG: 5 TABLET, FILM COATED ORAL at 09:04

## 2021-04-24 RX ADMIN — PREGABALIN 50 MG: 50 CAPSULE ORAL at 09:04

## 2021-04-24 RX ADMIN — Medication 10 ML: at 06:04

## 2021-04-24 RX ADMIN — BACITRACIN 1 EACH: 500 OINTMENT TOPICAL at 09:04

## 2021-04-24 RX ADMIN — HEPARIN SODIUM 7500 UNITS: 5000 INJECTION INTRAVENOUS; SUBCUTANEOUS at 09:04

## 2021-04-24 RX ADMIN — HEPARIN SODIUM 7500 UNITS: 5000 INJECTION INTRAVENOUS; SUBCUTANEOUS at 06:04

## 2021-04-24 RX ADMIN — OXYCODONE HYDROCHLORIDE 10 MG: 10 TABLET ORAL at 09:04

## 2021-04-24 RX ADMIN — ALUMINUM HYDROXIDE, MAGNESIUM HYDROXIDE, SIMETHICONE 30 ML: 400; 400; 40 SUSPENSION ORAL at 11:04

## 2021-04-24 RX ADMIN — POLYETHYLENE GLYCOL 3350 17 G: 17 POWDER, FOR SOLUTION ORAL at 09:04

## 2021-04-24 RX ADMIN — OXYCODONE HYDROCHLORIDE 10 MG: 10 TABLET ORAL at 03:04

## 2021-04-24 RX ADMIN — CEFTRIAXONE 2 G: 2 INJECTION, SOLUTION INTRAVENOUS at 06:04

## 2021-04-25 LAB
ALBUMIN SERPL BCP-MCNC: 1.9 G/DL (ref 3.5–5.2)
ALP SERPL-CCNC: 112 U/L (ref 55–135)
ALT SERPL W/O P-5'-P-CCNC: 33 U/L (ref 10–44)
ANION GAP SERPL CALC-SCNC: 3 MMOL/L (ref 8–16)
AST SERPL-CCNC: 63 U/L (ref 10–40)
BASOPHILS # BLD AUTO: 0.01 K/UL (ref 0–0.2)
BASOPHILS NFR BLD: 0.4 % (ref 0–1.9)
BILIRUB SERPL-MCNC: 1.4 MG/DL (ref 0.1–1)
BUN SERPL-MCNC: 10 MG/DL (ref 6–20)
CALCIUM SERPL-MCNC: 7.5 MG/DL (ref 8.7–10.5)
CHLORIDE SERPL-SCNC: 105 MMOL/L (ref 95–110)
CO2 SERPL-SCNC: 29 MMOL/L (ref 23–29)
CREAT SERPL-MCNC: 0.6 MG/DL (ref 0.5–1.4)
DIFFERENTIAL METHOD: ABNORMAL
EOSINOPHIL # BLD AUTO: 0.1 K/UL (ref 0–0.5)
EOSINOPHIL NFR BLD: 2.6 % (ref 0–8)
ERYTHROCYTE [DISTWIDTH] IN BLOOD BY AUTOMATED COUNT: 15.9 % (ref 11.5–14.5)
EST. GFR  (AFRICAN AMERICAN): >60 ML/MIN/1.73 M^2
EST. GFR  (NON AFRICAN AMERICAN): >60 ML/MIN/1.73 M^2
GLUCOSE SERPL-MCNC: 84 MG/DL (ref 70–110)
HCT VFR BLD AUTO: 29.1 % (ref 37–48.5)
HGB BLD-MCNC: 9.3 G/DL (ref 12–16)
IMM GRANULOCYTES # BLD AUTO: 0.02 K/UL (ref 0–0.04)
IMM GRANULOCYTES NFR BLD AUTO: 0.8 % (ref 0–0.5)
INR PPP: 1.1 (ref 0.8–1.2)
LYMPHOCYTES # BLD AUTO: 0.6 K/UL (ref 1–4.8)
LYMPHOCYTES NFR BLD: 20.8 % (ref 18–48)
MAGNESIUM SERPL-MCNC: 1.7 MG/DL (ref 1.6–2.6)
MCH RBC QN AUTO: 29.6 PG (ref 27–31)
MCHC RBC AUTO-ENTMCNC: 32 G/DL (ref 32–36)
MCV RBC AUTO: 93 FL (ref 82–98)
MONOCYTES # BLD AUTO: 0.3 K/UL (ref 0.3–1)
MONOCYTES NFR BLD: 12.5 % (ref 4–15)
NEUTROPHILS # BLD AUTO: 1.7 K/UL (ref 1.8–7.7)
NEUTROPHILS NFR BLD: 62.9 % (ref 38–73)
NRBC BLD-RTO: 0 /100 WBC
PHOSPHATE SERPL-MCNC: 3.1 MG/DL (ref 2.7–4.5)
PLATELET # BLD AUTO: 66 K/UL (ref 150–450)
PMV BLD AUTO: 9.9 FL (ref 9.2–12.9)
POTASSIUM SERPL-SCNC: 4 MMOL/L (ref 3.5–5.1)
PROT SERPL-MCNC: 5.9 G/DL (ref 6–8.4)
PROTHROMBIN TIME: 12.1 SEC (ref 9–12.5)
RBC # BLD AUTO: 3.14 M/UL (ref 4–5.4)
SODIUM SERPL-SCNC: 137 MMOL/L (ref 136–145)
WBC # BLD AUTO: 2.65 K/UL (ref 3.9–12.7)

## 2021-04-25 PROCEDURE — 25000003 PHARM REV CODE 250: Performed by: PHYSICIAN ASSISTANT

## 2021-04-25 PROCEDURE — 11000001 HC ACUTE MED/SURG PRIVATE ROOM

## 2021-04-25 PROCEDURE — 25000003 PHARM REV CODE 250: Performed by: STUDENT IN AN ORGANIZED HEALTH CARE EDUCATION/TRAINING PROGRAM

## 2021-04-25 PROCEDURE — 83735 ASSAY OF MAGNESIUM: CPT | Performed by: STUDENT IN AN ORGANIZED HEALTH CARE EDUCATION/TRAINING PROGRAM

## 2021-04-25 PROCEDURE — 85025 COMPLETE CBC W/AUTO DIFF WBC: CPT | Performed by: STUDENT IN AN ORGANIZED HEALTH CARE EDUCATION/TRAINING PROGRAM

## 2021-04-25 PROCEDURE — 25000003 PHARM REV CODE 250: Performed by: NEUROLOGICAL SURGERY

## 2021-04-25 PROCEDURE — 85610 PROTHROMBIN TIME: CPT | Performed by: PHYSICIAN ASSISTANT

## 2021-04-25 PROCEDURE — 99024 POSTOP FOLLOW-UP VISIT: CPT | Mod: ,,, | Performed by: PHYSICIAN ASSISTANT

## 2021-04-25 PROCEDURE — 99024 PR POST-OP FOLLOW-UP VISIT: ICD-10-PCS | Mod: ,,, | Performed by: PHYSICIAN ASSISTANT

## 2021-04-25 PROCEDURE — S4991 NICOTINE PATCH NONLEGEND: HCPCS | Performed by: STUDENT IN AN ORGANIZED HEALTH CARE EDUCATION/TRAINING PROGRAM

## 2021-04-25 PROCEDURE — 84100 ASSAY OF PHOSPHORUS: CPT | Performed by: STUDENT IN AN ORGANIZED HEALTH CARE EDUCATION/TRAINING PROGRAM

## 2021-04-25 PROCEDURE — 80053 COMPREHEN METABOLIC PANEL: CPT | Performed by: PHYSICIAN ASSISTANT

## 2021-04-25 PROCEDURE — 97535 SELF CARE MNGMENT TRAINING: CPT

## 2021-04-25 PROCEDURE — 63600175 PHARM REV CODE 636 W HCPCS: Performed by: STUDENT IN AN ORGANIZED HEALTH CARE EDUCATION/TRAINING PROGRAM

## 2021-04-25 PROCEDURE — A4216 STERILE WATER/SALINE, 10 ML: HCPCS | Performed by: NEUROLOGICAL SURGERY

## 2021-04-25 RX ADMIN — OXYCODONE HYDROCHLORIDE 10 MG: 10 TABLET ORAL at 02:04

## 2021-04-25 RX ADMIN — CYCLOBENZAPRINE HYDROCHLORIDE 5 MG: 5 TABLET, FILM COATED ORAL at 09:04

## 2021-04-25 RX ADMIN — SUCRALFATE 1 G: 1 SUSPENSION ORAL at 11:04

## 2021-04-25 RX ADMIN — ACETAMINOPHEN 500 MG: 500 TABLET, FILM COATED ORAL at 12:04

## 2021-04-25 RX ADMIN — TAMSULOSIN HYDROCHLORIDE 0.4 MG: 0.4 CAPSULE ORAL at 09:04

## 2021-04-25 RX ADMIN — ALUMINUM HYDROXIDE, MAGNESIUM HYDROXIDE, SIMETHICONE 30 ML: 400; 400; 40 SUSPENSION ORAL at 04:04

## 2021-04-25 RX ADMIN — ALUMINUM HYDROXIDE, MAGNESIUM HYDROXIDE, SIMETHICONE 30 ML: 400; 400; 40 SUSPENSION ORAL at 11:04

## 2021-04-25 RX ADMIN — SUCRALFATE 1 G: 1 SUSPENSION ORAL at 06:04

## 2021-04-25 RX ADMIN — CYCLOBENZAPRINE HYDROCHLORIDE 5 MG: 5 TABLET, FILM COATED ORAL at 02:04

## 2021-04-25 RX ADMIN — ACETAMINOPHEN 500 MG: 500 TABLET, FILM COATED ORAL at 11:04

## 2021-04-25 RX ADMIN — PREGABALIN 50 MG: 50 CAPSULE ORAL at 09:04

## 2021-04-25 RX ADMIN — BACITRACIN 1 EACH: 500 OINTMENT TOPICAL at 09:04

## 2021-04-25 RX ADMIN — Medication 10 ML: at 05:04

## 2021-04-25 RX ADMIN — ACETAMINOPHEN 500 MG: 500 TABLET, FILM COATED ORAL at 09:04

## 2021-04-25 RX ADMIN — Medication 10 ML: at 12:04

## 2021-04-25 RX ADMIN — SUCRALFATE 1 G: 1 SUSPENSION ORAL at 05:04

## 2021-04-25 RX ADMIN — POLYETHYLENE GLYCOL 3350 17 G: 17 POWDER, FOR SOLUTION ORAL at 09:04

## 2021-04-25 RX ADMIN — HEPARIN SODIUM 7500 UNITS: 5000 INJECTION INTRAVENOUS; SUBCUTANEOUS at 02:04

## 2021-04-25 RX ADMIN — HEPARIN SODIUM 7500 UNITS: 5000 INJECTION INTRAVENOUS; SUBCUTANEOUS at 09:04

## 2021-04-25 RX ADMIN — SUCRALFATE 1 G: 1 SUSPENSION ORAL at 12:04

## 2021-04-25 RX ADMIN — ALUMINUM HYDROXIDE, MAGNESIUM HYDROXIDE, SIMETHICONE 30 ML: 400; 400; 40 SUSPENSION ORAL at 09:04

## 2021-04-25 RX ADMIN — Medication 10 ML: at 06:04

## 2021-04-25 RX ADMIN — OXYCODONE 5 MG: 5 TABLET ORAL at 08:04

## 2021-04-25 RX ADMIN — OXYCODONE HYDROCHLORIDE 10 MG: 10 TABLET ORAL at 06:04

## 2021-04-25 RX ADMIN — HEPARIN SODIUM 7500 UNITS: 5000 INJECTION INTRAVENOUS; SUBCUTANEOUS at 05:04

## 2021-04-25 RX ADMIN — DRONABINOL 5 MG: 5 CAPSULE ORAL at 09:04

## 2021-04-25 RX ADMIN — OXYCODONE 5 MG: 5 TABLET ORAL at 09:04

## 2021-04-25 RX ADMIN — OXYCODONE 5 MG: 5 TABLET ORAL at 12:04

## 2021-04-25 RX ADMIN — CEFTRIAXONE 2 G: 2 INJECTION, SOLUTION INTRAVENOUS at 06:04

## 2021-04-25 RX ADMIN — DOCUSATE SODIUM 50MG AND SENNOSIDES 8.6MG 1 TABLET: 8.6; 5 TABLET, FILM COATED ORAL at 09:04

## 2021-04-25 RX ADMIN — NICOTINE 1 PATCH: 7 PATCH TRANSDERMAL at 09:04

## 2021-04-25 RX ADMIN — ALUMINUM HYDROXIDE, MAGNESIUM HYDROXIDE, SIMETHICONE 30 ML: 400; 400; 40 SUSPENSION ORAL at 06:04

## 2021-04-25 RX ADMIN — Medication 10 ML: at 11:04

## 2021-04-26 LAB
ALBUMIN SERPL BCP-MCNC: 1.9 G/DL (ref 3.5–5.2)
ALP SERPL-CCNC: 109 U/L (ref 55–135)
ALT SERPL W/O P-5'-P-CCNC: 33 U/L (ref 10–44)
ANION GAP SERPL CALC-SCNC: 4 MMOL/L (ref 8–16)
AST SERPL-CCNC: 59 U/L (ref 10–40)
BASOPHILS # BLD AUTO: 0.01 K/UL (ref 0–0.2)
BASOPHILS NFR BLD: 0.5 % (ref 0–1.9)
BILIRUB SERPL-MCNC: 1.2 MG/DL (ref 0.1–1)
BUN SERPL-MCNC: 10 MG/DL (ref 6–20)
CALCIUM SERPL-MCNC: 7.7 MG/DL (ref 8.7–10.5)
CHLORIDE SERPL-SCNC: 107 MMOL/L (ref 95–110)
CO2 SERPL-SCNC: 28 MMOL/L (ref 23–29)
CREAT SERPL-MCNC: 0.6 MG/DL (ref 0.5–1.4)
DIFFERENTIAL METHOD: ABNORMAL
EOSINOPHIL # BLD AUTO: 0.1 K/UL (ref 0–0.5)
EOSINOPHIL NFR BLD: 3.5 % (ref 0–8)
ERYTHROCYTE [DISTWIDTH] IN BLOOD BY AUTOMATED COUNT: 15.9 % (ref 11.5–14.5)
EST. GFR  (AFRICAN AMERICAN): >60 ML/MIN/1.73 M^2
EST. GFR  (NON AFRICAN AMERICAN): >60 ML/MIN/1.73 M^2
GLUCOSE SERPL-MCNC: 104 MG/DL (ref 70–110)
HCT VFR BLD AUTO: 29 % (ref 37–48.5)
HGB BLD-MCNC: 9.2 G/DL (ref 12–16)
IMM GRANULOCYTES # BLD AUTO: 0.01 K/UL (ref 0–0.04)
IMM GRANULOCYTES NFR BLD AUTO: 0.5 % (ref 0–0.5)
INR PPP: 1.1 (ref 0.8–1.2)
LYMPHOCYTES # BLD AUTO: 0.4 K/UL (ref 1–4.8)
LYMPHOCYTES NFR BLD: 17.9 % (ref 18–48)
MAGNESIUM SERPL-MCNC: 1.6 MG/DL (ref 1.6–2.6)
MCH RBC QN AUTO: 29.4 PG (ref 27–31)
MCHC RBC AUTO-ENTMCNC: 31.7 G/DL (ref 32–36)
MCV RBC AUTO: 93 FL (ref 82–98)
MONOCYTES # BLD AUTO: 0.3 K/UL (ref 0.3–1)
MONOCYTES NFR BLD: 13.4 % (ref 4–15)
NEUTROPHILS # BLD AUTO: 1.3 K/UL (ref 1.8–7.7)
NEUTROPHILS NFR BLD: 64.2 % (ref 38–73)
NRBC BLD-RTO: 0 /100 WBC
PHOSPHATE SERPL-MCNC: 3.3 MG/DL (ref 2.7–4.5)
PLATELET # BLD AUTO: 64 K/UL (ref 150–450)
PMV BLD AUTO: 9.5 FL (ref 9.2–12.9)
POTASSIUM SERPL-SCNC: 4 MMOL/L (ref 3.5–5.1)
PROT SERPL-MCNC: 5.9 G/DL (ref 6–8.4)
PROTHROMBIN TIME: 11.9 SEC (ref 9–12.5)
RBC # BLD AUTO: 3.13 M/UL (ref 4–5.4)
SODIUM SERPL-SCNC: 139 MMOL/L (ref 136–145)
WBC # BLD AUTO: 2.01 K/UL (ref 3.9–12.7)

## 2021-04-26 PROCEDURE — 63600175 PHARM REV CODE 636 W HCPCS: Performed by: STUDENT IN AN ORGANIZED HEALTH CARE EDUCATION/TRAINING PROGRAM

## 2021-04-26 PROCEDURE — 99024 PR POST-OP FOLLOW-UP VISIT: ICD-10-PCS | Mod: ,,, | Performed by: PHYSICIAN ASSISTANT

## 2021-04-26 PROCEDURE — 85025 COMPLETE CBC W/AUTO DIFF WBC: CPT | Performed by: STUDENT IN AN ORGANIZED HEALTH CARE EDUCATION/TRAINING PROGRAM

## 2021-04-26 PROCEDURE — 25000003 PHARM REV CODE 250: Performed by: NEUROLOGICAL SURGERY

## 2021-04-26 PROCEDURE — 85610 PROTHROMBIN TIME: CPT | Performed by: PHYSICIAN ASSISTANT

## 2021-04-26 PROCEDURE — 83735 ASSAY OF MAGNESIUM: CPT | Performed by: STUDENT IN AN ORGANIZED HEALTH CARE EDUCATION/TRAINING PROGRAM

## 2021-04-26 PROCEDURE — S4991 NICOTINE PATCH NONLEGEND: HCPCS | Performed by: STUDENT IN AN ORGANIZED HEALTH CARE EDUCATION/TRAINING PROGRAM

## 2021-04-26 PROCEDURE — 84100 ASSAY OF PHOSPHORUS: CPT | Performed by: STUDENT IN AN ORGANIZED HEALTH CARE EDUCATION/TRAINING PROGRAM

## 2021-04-26 PROCEDURE — 25000003 PHARM REV CODE 250: Performed by: STUDENT IN AN ORGANIZED HEALTH CARE EDUCATION/TRAINING PROGRAM

## 2021-04-26 PROCEDURE — 25000003 PHARM REV CODE 250: Performed by: PHYSICIAN ASSISTANT

## 2021-04-26 PROCEDURE — 80053 COMPREHEN METABOLIC PANEL: CPT | Performed by: PHYSICIAN ASSISTANT

## 2021-04-26 PROCEDURE — 99024 POSTOP FOLLOW-UP VISIT: CPT | Mod: ,,, | Performed by: PHYSICIAN ASSISTANT

## 2021-04-26 PROCEDURE — A4216 STERILE WATER/SALINE, 10 ML: HCPCS | Performed by: NEUROLOGICAL SURGERY

## 2021-04-26 PROCEDURE — 11000001 HC ACUTE MED/SURG PRIVATE ROOM

## 2021-04-26 RX ORDER — SYRING-NEEDL,DISP,INSUL,0.3 ML 29 G X1/2"
296 SYRINGE, EMPTY DISPOSABLE MISCELLANEOUS
Status: DISCONTINUED | OUTPATIENT
Start: 2021-04-26 | End: 2021-04-26

## 2021-04-26 RX ORDER — PSEUDOEPHEDRINE/ACETAMINOPHEN 30MG-500MG
100 TABLET ORAL
Status: DISCONTINUED | OUTPATIENT
Start: 2021-04-26 | End: 2021-04-26

## 2021-04-26 RX ORDER — MAGNESIUM SULFATE HEPTAHYDRATE 40 MG/ML
2 INJECTION, SOLUTION INTRAVENOUS ONCE
Status: COMPLETED | OUTPATIENT
Start: 2021-04-26 | End: 2021-04-26

## 2021-04-26 RX ORDER — CYCLOBENZAPRINE HCL 10 MG
10 TABLET ORAL 3 TIMES DAILY
Status: DISCONTINUED | OUTPATIENT
Start: 2021-04-26 | End: 2021-04-27 | Stop reason: HOSPADM

## 2021-04-26 RX ADMIN — BACITRACIN 1 EACH: 500 OINTMENT TOPICAL at 09:04

## 2021-04-26 RX ADMIN — CEFTRIAXONE 2 G: 2 INJECTION, SOLUTION INTRAVENOUS at 06:04

## 2021-04-26 RX ADMIN — DOCUSATE SODIUM 50MG AND SENNOSIDES 8.6MG 1 TABLET: 8.6; 5 TABLET, FILM COATED ORAL at 09:04

## 2021-04-26 RX ADMIN — PREGABALIN 50 MG: 50 CAPSULE ORAL at 09:04

## 2021-04-26 RX ADMIN — MAGNESIUM SULFATE 2 G: 2 INJECTION INTRAVENOUS at 10:04

## 2021-04-26 RX ADMIN — TAMSULOSIN HYDROCHLORIDE 0.4 MG: 0.4 CAPSULE ORAL at 09:04

## 2021-04-26 RX ADMIN — Medication 10 ML: at 06:04

## 2021-04-26 RX ADMIN — SUCRALFATE 1 G: 1 SUSPENSION ORAL at 12:04

## 2021-04-26 RX ADMIN — CYCLOBENZAPRINE HYDROCHLORIDE 5 MG: 5 TABLET, FILM COATED ORAL at 09:04

## 2021-04-26 RX ADMIN — SUCRALFATE 1 G: 1 SUSPENSION ORAL at 06:04

## 2021-04-26 RX ADMIN — OXYCODONE HYDROCHLORIDE 10 MG: 10 TABLET ORAL at 09:04

## 2021-04-26 RX ADMIN — ALUMINUM HYDROXIDE, MAGNESIUM HYDROXIDE, SIMETHICONE 30 ML: 400; 400; 40 SUSPENSION ORAL at 06:04

## 2021-04-26 RX ADMIN — CYCLOBENZAPRINE 10 MG: 10 TABLET, FILM COATED ORAL at 09:04

## 2021-04-26 RX ADMIN — OXYCODONE HYDROCHLORIDE 10 MG: 10 TABLET ORAL at 06:04

## 2021-04-26 RX ADMIN — CYCLOBENZAPRINE HYDROCHLORIDE 5 MG: 5 TABLET, FILM COATED ORAL at 03:04

## 2021-04-26 RX ADMIN — Medication 10 ML: at 12:04

## 2021-04-26 RX ADMIN — HEPARIN SODIUM 7500 UNITS: 5000 INJECTION INTRAVENOUS; SUBCUTANEOUS at 09:04

## 2021-04-26 RX ADMIN — POLYETHYLENE GLYCOL 3350 17 G: 17 POWDER, FOR SOLUTION ORAL at 09:04

## 2021-04-26 RX ADMIN — DRONABINOL 5 MG: 5 CAPSULE ORAL at 09:04

## 2021-04-26 RX ADMIN — HEPARIN SODIUM 7500 UNITS: 5000 INJECTION INTRAVENOUS; SUBCUTANEOUS at 06:04

## 2021-04-26 RX ADMIN — OXYCODONE HYDROCHLORIDE 10 MG: 10 TABLET ORAL at 12:04

## 2021-04-26 RX ADMIN — HEPARIN SODIUM 7500 UNITS: 5000 INJECTION INTRAVENOUS; SUBCUTANEOUS at 01:04

## 2021-04-26 RX ADMIN — NICOTINE 1 PATCH: 7 PATCH TRANSDERMAL at 09:04

## 2021-04-26 RX ADMIN — OXYCODONE HYDROCHLORIDE 10 MG: 10 TABLET ORAL at 02:04

## 2021-04-26 RX ADMIN — OXYCODONE HYDROCHLORIDE 10 MG: 10 TABLET ORAL at 04:04

## 2021-04-27 ENCOUNTER — HOSPITAL ENCOUNTER (INPATIENT)
Facility: HOSPITAL | Age: 41
LOS: 10 days | Discharge: HOME | DRG: 094 | End: 2021-05-07
Attending: INTERNAL MEDICINE | Admitting: INTERNAL MEDICINE
Payer: MEDICAID

## 2021-04-27 VITALS
OXYGEN SATURATION: 98 % | HEIGHT: 67 IN | SYSTOLIC BLOOD PRESSURE: 126 MMHG | RESPIRATION RATE: 18 BRPM | WEIGHT: 293 LBS | DIASTOLIC BLOOD PRESSURE: 72 MMHG | BODY MASS INDEX: 45.99 KG/M2 | HEART RATE: 102 BPM | TEMPERATURE: 98 F

## 2021-04-27 DIAGNOSIS — G06.2 EPIDURAL ABSCESS: Primary | ICD-10-CM

## 2021-04-27 LAB
ALBUMIN SERPL BCP-MCNC: 2.1 G/DL (ref 3.5–5.2)
ALP SERPL-CCNC: 130 U/L (ref 55–135)
ALT SERPL W/O P-5'-P-CCNC: 32 U/L (ref 10–44)
ANION GAP SERPL CALC-SCNC: 5 MMOL/L (ref 8–16)
AST SERPL-CCNC: 56 U/L (ref 10–40)
BASOPHILS # BLD AUTO: 0.02 K/UL (ref 0–0.2)
BASOPHILS NFR BLD: 1 % (ref 0–1.9)
BILIRUB SERPL-MCNC: 1.1 MG/DL (ref 0.1–1)
BUN SERPL-MCNC: 7 MG/DL (ref 6–20)
CALCIUM SERPL-MCNC: 7.8 MG/DL (ref 8.7–10.5)
CHLORIDE SERPL-SCNC: 105 MMOL/L (ref 95–110)
CO2 SERPL-SCNC: 26 MMOL/L (ref 23–29)
CREAT SERPL-MCNC: 0.7 MG/DL (ref 0.5–1.4)
DIFFERENTIAL METHOD: ABNORMAL
EOSINOPHIL # BLD AUTO: 0.1 K/UL (ref 0–0.5)
EOSINOPHIL NFR BLD: 3.9 % (ref 0–8)
ERYTHROCYTE [DISTWIDTH] IN BLOOD BY AUTOMATED COUNT: 16.2 % (ref 11.5–14.5)
EST. GFR  (AFRICAN AMERICAN): >60 ML/MIN/1.73 M^2
EST. GFR  (NON AFRICAN AMERICAN): >60 ML/MIN/1.73 M^2
GLUCOSE SERPL-MCNC: 85 MG/DL (ref 70–110)
HCT VFR BLD AUTO: 30.4 % (ref 37–48.5)
HGB BLD-MCNC: 9.7 G/DL (ref 12–16)
IMM GRANULOCYTES # BLD AUTO: 0 K/UL (ref 0–0.04)
IMM GRANULOCYTES NFR BLD AUTO: 0 % (ref 0–0.5)
INR PPP: 1.1 (ref 0.8–1.2)
LYMPHOCYTES # BLD AUTO: 0.4 K/UL (ref 1–4.8)
LYMPHOCYTES NFR BLD: 19.4 % (ref 18–48)
MAGNESIUM SERPL-MCNC: 1.5 MG/DL (ref 1.6–2.6)
MCH RBC QN AUTO: 28.9 PG (ref 27–31)
MCHC RBC AUTO-ENTMCNC: 31.9 G/DL (ref 32–36)
MCV RBC AUTO: 91 FL (ref 82–98)
MONOCYTES # BLD AUTO: 0.3 K/UL (ref 0.3–1)
MONOCYTES NFR BLD: 13.6 % (ref 4–15)
NEUTROPHILS # BLD AUTO: 1.3 K/UL (ref 1.8–7.7)
NEUTROPHILS NFR BLD: 62.1 % (ref 38–73)
NRBC BLD-RTO: 0 /100 WBC
PHOSPHATE SERPL-MCNC: 3.5 MG/DL (ref 2.7–4.5)
PLATELET # BLD AUTO: 66 K/UL (ref 150–450)
PMV BLD AUTO: 9.7 FL (ref 9.2–12.9)
POTASSIUM SERPL-SCNC: 4 MMOL/L (ref 3.5–5.1)
PROT SERPL-MCNC: 6.3 G/DL (ref 6–8.4)
PROTHROMBIN TIME: 11.9 SEC (ref 9–12.5)
RBC # BLD AUTO: 3.36 M/UL (ref 4–5.4)
SARS-COV-2 RNA RESP QL NAA+PROBE: NOT DETECTED
SODIUM SERPL-SCNC: 136 MMOL/L (ref 136–145)
WBC # BLD AUTO: 2.06 K/UL (ref 3.9–12.7)

## 2021-04-27 PROCEDURE — 11000001 HC ACUTE MED/SURG PRIVATE ROOM

## 2021-04-27 PROCEDURE — 25000003 PHARM REV CODE 250: Performed by: NEUROLOGICAL SURGERY

## 2021-04-27 PROCEDURE — 25000003 PHARM REV CODE 250: Performed by: STUDENT IN AN ORGANIZED HEALTH CARE EDUCATION/TRAINING PROGRAM

## 2021-04-27 PROCEDURE — 94761 N-INVAS EAR/PLS OXIMETRY MLT: CPT

## 2021-04-27 PROCEDURE — A4216 STERILE WATER/SALINE, 10 ML: HCPCS | Performed by: STUDENT IN AN ORGANIZED HEALTH CARE EDUCATION/TRAINING PROGRAM

## 2021-04-27 PROCEDURE — S4991 NICOTINE PATCH NONLEGEND: HCPCS | Performed by: STUDENT IN AN ORGANIZED HEALTH CARE EDUCATION/TRAINING PROGRAM

## 2021-04-27 PROCEDURE — 99024 POSTOP FOLLOW-UP VISIT: CPT | Mod: ,,, | Performed by: PHYSICIAN ASSISTANT

## 2021-04-27 PROCEDURE — 25000003 PHARM REV CODE 250: Performed by: INTERNAL MEDICINE

## 2021-04-27 PROCEDURE — 94799 UNLISTED PULMONARY SVC/PX: CPT

## 2021-04-27 PROCEDURE — 85610 PROTHROMBIN TIME: CPT | Performed by: PHYSICIAN ASSISTANT

## 2021-04-27 PROCEDURE — U0005 INFEC AGEN DETEC AMPLI PROBE: HCPCS | Performed by: STUDENT IN AN ORGANIZED HEALTH CARE EDUCATION/TRAINING PROGRAM

## 2021-04-27 PROCEDURE — 99900035 HC TECH TIME PER 15 MIN (STAT)

## 2021-04-27 PROCEDURE — 97530 THERAPEUTIC ACTIVITIES: CPT

## 2021-04-27 PROCEDURE — 97116 GAIT TRAINING THERAPY: CPT | Mod: CQ

## 2021-04-27 PROCEDURE — 63600175 PHARM REV CODE 636 W HCPCS: Performed by: STUDENT IN AN ORGANIZED HEALTH CARE EDUCATION/TRAINING PROGRAM

## 2021-04-27 PROCEDURE — 97530 THERAPEUTIC ACTIVITIES: CPT | Mod: CQ

## 2021-04-27 PROCEDURE — 99024 PR POST-OP FOLLOW-UP VISIT: ICD-10-PCS | Mod: ,,, | Performed by: PHYSICIAN ASSISTANT

## 2021-04-27 PROCEDURE — 36415 COLL VENOUS BLD VENIPUNCTURE: CPT | Performed by: PHYSICIAN ASSISTANT

## 2021-04-27 PROCEDURE — 85025 COMPLETE CBC W/AUTO DIFF WBC: CPT | Performed by: STUDENT IN AN ORGANIZED HEALTH CARE EDUCATION/TRAINING PROGRAM

## 2021-04-27 PROCEDURE — 97535 SELF CARE MNGMENT TRAINING: CPT

## 2021-04-27 PROCEDURE — 25000003 PHARM REV CODE 250: Performed by: PHYSICIAN ASSISTANT

## 2021-04-27 PROCEDURE — 83735 ASSAY OF MAGNESIUM: CPT | Performed by: STUDENT IN AN ORGANIZED HEALTH CARE EDUCATION/TRAINING PROGRAM

## 2021-04-27 PROCEDURE — 99900031 HC PATIENT EDUCATION (STAT)

## 2021-04-27 PROCEDURE — A4216 STERILE WATER/SALINE, 10 ML: HCPCS | Performed by: NEUROLOGICAL SURGERY

## 2021-04-27 PROCEDURE — 84100 ASSAY OF PHOSPHORUS: CPT | Performed by: STUDENT IN AN ORGANIZED HEALTH CARE EDUCATION/TRAINING PROGRAM

## 2021-04-27 PROCEDURE — 80053 COMPREHEN METABOLIC PANEL: CPT | Performed by: PHYSICIAN ASSISTANT

## 2021-04-27 PROCEDURE — U0003 INFECTIOUS AGENT DETECTION BY NUCLEIC ACID (DNA OR RNA); SEVERE ACUTE RESPIRATORY SYNDROME CORONAVIRUS 2 (SARS-COV-2) (CORONAVIRUS DISEASE [COVID-19]), AMPLIFIED PROBE TECHNIQUE, MAKING USE OF HIGH THROUGHPUT TECHNOLOGIES AS DESCRIBED BY CMS-2020-01-R: HCPCS | Performed by: STUDENT IN AN ORGANIZED HEALTH CARE EDUCATION/TRAINING PROGRAM

## 2021-04-27 RX ORDER — AMOXICILLIN 250 MG
1 CAPSULE ORAL DAILY
Status: DISCONTINUED | OUTPATIENT
Start: 2021-04-28 | End: 2021-05-07 | Stop reason: HOSPADM

## 2021-04-27 RX ORDER — SODIUM CHLORIDE 0.9 % (FLUSH) 0.9 %
10 SYRINGE (ML) INJECTION
Status: CANCELLED | OUTPATIENT
Start: 2021-04-27

## 2021-04-27 RX ORDER — IBUPROFEN 200 MG
16 TABLET ORAL
Status: DISCONTINUED | OUTPATIENT
Start: 2021-04-27 | End: 2021-05-07 | Stop reason: HOSPADM

## 2021-04-27 RX ORDER — CYCLOBENZAPRINE HCL 10 MG
10 TABLET ORAL 3 TIMES DAILY
Status: DISCONTINUED | OUTPATIENT
Start: 2021-04-27 | End: 2021-05-07 | Stop reason: HOSPADM

## 2021-04-27 RX ORDER — SODIUM CHLORIDE 0.9 % (FLUSH) 0.9 %
10 SYRINGE (ML) INJECTION EVERY 6 HOURS
Status: CANCELLED | OUTPATIENT
Start: 2021-04-27

## 2021-04-27 RX ORDER — HEPARIN SODIUM 5000 [USP'U]/ML
7500 INJECTION, SOLUTION INTRAVENOUS; SUBCUTANEOUS EVERY 8 HOURS
Status: CANCELLED | OUTPATIENT
Start: 2021-04-27

## 2021-04-27 RX ORDER — OXYCODONE HYDROCHLORIDE 5 MG/1
5 TABLET ORAL EVERY 4 HOURS PRN
Status: DISCONTINUED | OUTPATIENT
Start: 2021-04-27 | End: 2021-05-04

## 2021-04-27 RX ORDER — SUCRALFATE 1 G/10ML
1 SUSPENSION ORAL EVERY 6 HOURS
Status: CANCELLED | OUTPATIENT
Start: 2021-04-27

## 2021-04-27 RX ORDER — BACITRACIN ZINC 500 [USP'U]/G
OINTMENT TOPICAL 2 TIMES DAILY
Status: DISCONTINUED | OUTPATIENT
Start: 2021-04-27 | End: 2021-05-07 | Stop reason: HOSPADM

## 2021-04-27 RX ORDER — ACETAMINOPHEN 500 MG
500 TABLET ORAL EVERY 6 HOURS PRN
Status: DISCONTINUED | OUTPATIENT
Start: 2021-04-27 | End: 2021-05-04

## 2021-04-27 RX ORDER — OXYCODONE HYDROCHLORIDE 5 MG/1
5 TABLET ORAL EVERY 4 HOURS PRN
Status: CANCELLED | OUTPATIENT
Start: 2021-04-27

## 2021-04-27 RX ORDER — SODIUM CHLORIDE 9 MG/ML
INJECTION, SOLUTION INTRAVENOUS CONTINUOUS
Status: DISCONTINUED | OUTPATIENT
Start: 2021-04-27 | End: 2021-04-27

## 2021-04-27 RX ORDER — TAMSULOSIN HYDROCHLORIDE 0.4 MG/1
0.4 CAPSULE ORAL DAILY
Status: DISCONTINUED | OUTPATIENT
Start: 2021-04-28 | End: 2021-05-07 | Stop reason: HOSPADM

## 2021-04-27 RX ORDER — BACITRACIN ZINC 500 [USP'U]/G
OINTMENT TOPICAL 2 TIMES DAILY
Status: CANCELLED | OUTPATIENT
Start: 2021-04-27

## 2021-04-27 RX ORDER — AMOXICILLIN 250 MG
1 CAPSULE ORAL DAILY
Status: CANCELLED | OUTPATIENT
Start: 2021-04-28

## 2021-04-27 RX ORDER — POLYETHYLENE GLYCOL 3350 17 G/17G
17 POWDER, FOR SOLUTION ORAL 2 TIMES DAILY
Status: CANCELLED | OUTPATIENT
Start: 2021-04-27

## 2021-04-27 RX ORDER — SODIUM CHLORIDE 0.9 % (FLUSH) 0.9 %
10 SYRINGE (ML) INJECTION
Status: DISCONTINUED | OUTPATIENT
Start: 2021-04-27 | End: 2021-05-07 | Stop reason: HOSPADM

## 2021-04-27 RX ORDER — PREGABALIN 50 MG/1
50 CAPSULE ORAL 2 TIMES DAILY
Status: CANCELLED | OUTPATIENT
Start: 2021-04-27

## 2021-04-27 RX ORDER — SODIUM CHLORIDE 0.9 % (FLUSH) 0.9 %
10 SYRINGE (ML) INJECTION EVERY 6 HOURS
Status: DISCONTINUED | OUTPATIENT
Start: 2021-04-27 | End: 2021-05-07 | Stop reason: HOSPADM

## 2021-04-27 RX ORDER — TAMSULOSIN HYDROCHLORIDE 0.4 MG/1
0.4 CAPSULE ORAL DAILY
Status: CANCELLED | OUTPATIENT
Start: 2021-04-28

## 2021-04-27 RX ORDER — SODIUM CHLORIDE 9 MG/ML
INJECTION, SOLUTION INTRAVENOUS CONTINUOUS
Status: CANCELLED | OUTPATIENT
Start: 2021-04-27

## 2021-04-27 RX ORDER — GLUCAGON 1 MG
1 KIT INJECTION
Status: CANCELLED | OUTPATIENT
Start: 2021-04-27

## 2021-04-27 RX ORDER — SUCRALFATE 1 G/10ML
1 SUSPENSION ORAL EVERY 6 HOURS
Status: DISCONTINUED | OUTPATIENT
Start: 2021-04-27 | End: 2021-05-07 | Stop reason: HOSPADM

## 2021-04-27 RX ORDER — IBUPROFEN 200 MG
24 TABLET ORAL
Status: CANCELLED | OUTPATIENT
Start: 2021-04-27

## 2021-04-27 RX ORDER — IBUPROFEN 200 MG
16 TABLET ORAL
Status: CANCELLED | OUTPATIENT
Start: 2021-04-27

## 2021-04-27 RX ORDER — CYCLOBENZAPRINE HCL 10 MG
10 TABLET ORAL 3 TIMES DAILY
Status: CANCELLED | OUTPATIENT
Start: 2021-04-27

## 2021-04-27 RX ORDER — MUPIROCIN 20 MG/G
OINTMENT TOPICAL 2 TIMES DAILY
Status: COMPLETED | OUTPATIENT
Start: 2021-04-27 | End: 2021-05-02

## 2021-04-27 RX ORDER — NALOXONE HCL 0.4 MG/ML
0.4 VIAL (ML) INJECTION
Status: DISCONTINUED | OUTPATIENT
Start: 2021-04-27 | End: 2021-05-07 | Stop reason: HOSPADM

## 2021-04-27 RX ORDER — NALOXONE HCL 0.4 MG/ML
0.4 VIAL (ML) INJECTION
Status: CANCELLED | OUTPATIENT
Start: 2021-04-27

## 2021-04-27 RX ORDER — OXYCODONE HYDROCHLORIDE 10 MG/1
10 TABLET ORAL EVERY 4 HOURS PRN
Status: CANCELLED | OUTPATIENT
Start: 2021-04-27

## 2021-04-27 RX ORDER — ACETAMINOPHEN 500 MG
500 TABLET ORAL EVERY 6 HOURS PRN
Status: CANCELLED | OUTPATIENT
Start: 2021-04-27

## 2021-04-27 RX ORDER — OXYCODONE HYDROCHLORIDE 10 MG/1
10 TABLET ORAL EVERY 4 HOURS PRN
Status: DISCONTINUED | OUTPATIENT
Start: 2021-04-27 | End: 2021-05-04

## 2021-04-27 RX ORDER — IBUPROFEN 200 MG
24 TABLET ORAL
Status: DISCONTINUED | OUTPATIENT
Start: 2021-04-27 | End: 2021-05-07 | Stop reason: HOSPADM

## 2021-04-27 RX ORDER — HEPARIN SODIUM 5000 [USP'U]/ML
7500 INJECTION, SOLUTION INTRAVENOUS; SUBCUTANEOUS EVERY 8 HOURS
Status: DISCONTINUED | OUTPATIENT
Start: 2021-04-27 | End: 2021-05-07 | Stop reason: HOSPADM

## 2021-04-27 RX ORDER — POLYETHYLENE GLYCOL 3350 17 G/17G
17 POWDER, FOR SOLUTION ORAL 2 TIMES DAILY
Status: DISCONTINUED | OUTPATIENT
Start: 2021-04-27 | End: 2021-05-07 | Stop reason: HOSPADM

## 2021-04-27 RX ORDER — GLUCAGON 1 MG
1 KIT INJECTION
Status: DISCONTINUED | OUTPATIENT
Start: 2021-04-27 | End: 2021-05-07 | Stop reason: HOSPADM

## 2021-04-27 RX ORDER — PREGABALIN 50 MG/1
50 CAPSULE ORAL 2 TIMES DAILY
Status: DISCONTINUED | OUTPATIENT
Start: 2021-04-27 | End: 2021-05-07 | Stop reason: HOSPADM

## 2021-04-27 RX ORDER — NICOTINE 7MG/24HR
1 PATCH, TRANSDERMAL 24 HOURS TRANSDERMAL DAILY
Status: CANCELLED | OUTPATIENT
Start: 2021-04-28

## 2021-04-27 RX ORDER — CYCLOBENZAPRINE HCL 5 MG
10 TABLET ORAL 3 TIMES DAILY PRN
Qty: 90 TABLET | Refills: 0 | Status: ON HOLD | OUTPATIENT
Start: 2021-04-27 | End: 2021-05-07 | Stop reason: HOSPADM

## 2021-04-27 RX ORDER — NICOTINE 7MG/24HR
1 PATCH, TRANSDERMAL 24 HOURS TRANSDERMAL DAILY
Status: DISCONTINUED | OUTPATIENT
Start: 2021-04-28 | End: 2021-05-07 | Stop reason: HOSPADM

## 2021-04-27 RX ADMIN — SUCRALFATE 1 G: 1 SUSPENSION ORAL at 12:04

## 2021-04-27 RX ADMIN — BACITRACIN 1 EACH: 500 OINTMENT TOPICAL at 08:04

## 2021-04-27 RX ADMIN — SUCRALFATE 1 G: 1 SUSPENSION ORAL at 11:04

## 2021-04-27 RX ADMIN — NICOTINE 1 PATCH: 7 PATCH TRANSDERMAL at 08:04

## 2021-04-27 RX ADMIN — OXYCODONE HYDROCHLORIDE 10 MG: 10 TABLET ORAL at 04:04

## 2021-04-27 RX ADMIN — OXYCODONE HYDROCHLORIDE 10 MG: 10 TABLET ORAL at 10:04

## 2021-04-27 RX ADMIN — CYCLOBENZAPRINE 10 MG: 10 TABLET, FILM COATED ORAL at 02:04

## 2021-04-27 RX ADMIN — BACITRACIN ZINC: 500 OINTMENT TOPICAL at 08:04

## 2021-04-27 RX ADMIN — CYCLOBENZAPRINE 10 MG: 10 TABLET, FILM COATED ORAL at 09:04

## 2021-04-27 RX ADMIN — HEPARIN SODIUM 7500 UNITS: 5000 INJECTION INTRAVENOUS; SUBCUTANEOUS at 09:04

## 2021-04-27 RX ADMIN — OXYCODONE HYDROCHLORIDE 10 MG: 10 TABLET ORAL at 02:04

## 2021-04-27 RX ADMIN — SUCRALFATE 1 G: 1 SUSPENSION ORAL at 06:04

## 2021-04-27 RX ADMIN — HEPARIN SODIUM 7500 UNITS: 5000 INJECTION INTRAVENOUS; SUBCUTANEOUS at 02:04

## 2021-04-27 RX ADMIN — SUCRALFATE 1 G: 1 SUSPENSION ORAL at 08:04

## 2021-04-27 RX ADMIN — MUPIROCIN: 20 OINTMENT TOPICAL at 08:04

## 2021-04-27 RX ADMIN — PREGABALIN 50 MG: 50 CAPSULE ORAL at 08:04

## 2021-04-27 RX ADMIN — OXYCODONE HYDROCHLORIDE 10 MG: 10 TABLET ORAL at 08:04

## 2021-04-27 RX ADMIN — Medication 10 ML: at 11:04

## 2021-04-27 RX ADMIN — Medication 10 ML: at 09:04

## 2021-04-27 RX ADMIN — CYCLOBENZAPRINE 10 MG: 10 TABLET, FILM COATED ORAL at 08:04

## 2021-04-27 RX ADMIN — OXYCODONE HYDROCHLORIDE 10 MG: 10 TABLET ORAL at 06:04

## 2021-04-27 RX ADMIN — HEPARIN SODIUM 7500 UNITS: 5000 INJECTION INTRAVENOUS; SUBCUTANEOUS at 06:04

## 2021-04-27 RX ADMIN — TAMSULOSIN HYDROCHLORIDE 0.4 MG: 0.4 CAPSULE ORAL at 08:04

## 2021-04-27 RX ADMIN — ACETAMINOPHEN 500 MG: 500 TABLET, FILM COATED ORAL at 06:04

## 2021-04-27 RX ADMIN — POLYETHYLENE GLYCOL 3350 17 G: 17 POWDER, FOR SOLUTION ORAL at 08:04

## 2021-04-28 LAB
ALBUMIN SERPL BCP-MCNC: 1.9 G/DL (ref 3.5–5.2)
ALP SERPL-CCNC: 120 U/L (ref 55–135)
ALT SERPL W/O P-5'-P-CCNC: 34 U/L (ref 10–44)
ANION GAP SERPL CALC-SCNC: 4 MMOL/L (ref 8–16)
AST SERPL-CCNC: 48 U/L (ref 10–40)
BASOPHILS # BLD AUTO: ABNORMAL K/UL (ref 0–0.2)
BASOPHILS NFR BLD: 0 % (ref 0–1.9)
BILIRUB SERPL-MCNC: 1.1 MG/DL (ref 0.1–1)
BUN SERPL-MCNC: 9 MG/DL (ref 6–20)
CALCIUM SERPL-MCNC: 8.1 MG/DL (ref 8.7–10.5)
CHLORIDE SERPL-SCNC: 108 MMOL/L (ref 95–110)
CO2 SERPL-SCNC: 28 MMOL/L (ref 23–29)
CREAT SERPL-MCNC: 0.5 MG/DL (ref 0.5–1.4)
DIFFERENTIAL METHOD: ABNORMAL
EOSINOPHIL # BLD AUTO: ABNORMAL K/UL (ref 0–0.5)
EOSINOPHIL NFR BLD: 10 % (ref 0–8)
ERYTHROCYTE [DISTWIDTH] IN BLOOD BY AUTOMATED COUNT: 15.9 % (ref 11.5–14.5)
EST. GFR  (AFRICAN AMERICAN): >60 ML/MIN/1.73 M^2
EST. GFR  (NON AFRICAN AMERICAN): >60 ML/MIN/1.73 M^2
GLUCOSE SERPL-MCNC: 97 MG/DL (ref 70–110)
HCT VFR BLD AUTO: 28.2 % (ref 37–48.5)
HGB BLD-MCNC: 9.2 G/DL (ref 12–16)
IMM GRANULOCYTES # BLD AUTO: ABNORMAL K/UL (ref 0–0.04)
IMM GRANULOCYTES NFR BLD AUTO: ABNORMAL % (ref 0–0.5)
INR PPP: 1.1 (ref 0.8–1.2)
LYMPHOCYTES # BLD AUTO: ABNORMAL K/UL (ref 1–4.8)
LYMPHOCYTES NFR BLD: 20 % (ref 18–48)
MAGNESIUM SERPL-MCNC: 1.7 MG/DL (ref 1.6–2.6)
MCH RBC QN AUTO: 29.6 PG (ref 27–31)
MCHC RBC AUTO-ENTMCNC: 32.6 G/DL (ref 32–36)
MCV RBC AUTO: 91 FL (ref 82–98)
MONOCYTES # BLD AUTO: ABNORMAL K/UL (ref 0.3–1)
MONOCYTES NFR BLD: 10 % (ref 4–15)
NEUTROPHILS NFR BLD: 54 % (ref 38–73)
NEUTS BAND NFR BLD MANUAL: 6 %
NRBC BLD-RTO: 0 /100 WBC
PHOSPHATE SERPL-MCNC: 3.7 MG/DL (ref 2.7–4.5)
PLATELET # BLD AUTO: 63 K/UL (ref 150–450)
PMV BLD AUTO: 9.7 FL (ref 9.2–12.9)
POTASSIUM SERPL-SCNC: 3.8 MMOL/L (ref 3.5–5.1)
PROT SERPL-MCNC: 6.2 G/DL (ref 6–8.4)
PROTHROMBIN TIME: 12.1 SEC (ref 9–12.5)
RBC # BLD AUTO: 3.11 M/UL (ref 4–5.4)
SODIUM SERPL-SCNC: 140 MMOL/L (ref 136–145)
WBC # BLD AUTO: 1.7 K/UL (ref 3.9–12.7)

## 2021-04-28 PROCEDURE — 92523 SPEECH SOUND LANG COMPREHEN: CPT

## 2021-04-28 PROCEDURE — 97161 PT EVAL LOW COMPLEX 20 MIN: CPT

## 2021-04-28 PROCEDURE — 36415 COLL VENOUS BLD VENIPUNCTURE: CPT | Performed by: STUDENT IN AN ORGANIZED HEALTH CARE EDUCATION/TRAINING PROGRAM

## 2021-04-28 PROCEDURE — 94799 UNLISTED PULMONARY SVC/PX: CPT

## 2021-04-28 PROCEDURE — 63600175 PHARM REV CODE 636 W HCPCS: Performed by: STUDENT IN AN ORGANIZED HEALTH CARE EDUCATION/TRAINING PROGRAM

## 2021-04-28 PROCEDURE — 83735 ASSAY OF MAGNESIUM: CPT | Performed by: STUDENT IN AN ORGANIZED HEALTH CARE EDUCATION/TRAINING PROGRAM

## 2021-04-28 PROCEDURE — 11000001 HC ACUTE MED/SURG PRIVATE ROOM

## 2021-04-28 PROCEDURE — 85027 COMPLETE CBC AUTOMATED: CPT | Performed by: STUDENT IN AN ORGANIZED HEALTH CARE EDUCATION/TRAINING PROGRAM

## 2021-04-28 PROCEDURE — S4991 NICOTINE PATCH NONLEGEND: HCPCS | Performed by: STUDENT IN AN ORGANIZED HEALTH CARE EDUCATION/TRAINING PROGRAM

## 2021-04-28 PROCEDURE — A4216 STERILE WATER/SALINE, 10 ML: HCPCS | Performed by: STUDENT IN AN ORGANIZED HEALTH CARE EDUCATION/TRAINING PROGRAM

## 2021-04-28 PROCEDURE — 85007 BL SMEAR W/DIFF WBC COUNT: CPT | Performed by: STUDENT IN AN ORGANIZED HEALTH CARE EDUCATION/TRAINING PROGRAM

## 2021-04-28 PROCEDURE — 25000003 PHARM REV CODE 250: Performed by: STUDENT IN AN ORGANIZED HEALTH CARE EDUCATION/TRAINING PROGRAM

## 2021-04-28 PROCEDURE — 97166 OT EVAL MOD COMPLEX 45 MIN: CPT

## 2021-04-28 PROCEDURE — 25000003 PHARM REV CODE 250: Performed by: INTERNAL MEDICINE

## 2021-04-28 PROCEDURE — 80053 COMPREHEN METABOLIC PANEL: CPT | Performed by: STUDENT IN AN ORGANIZED HEALTH CARE EDUCATION/TRAINING PROGRAM

## 2021-04-28 PROCEDURE — 99900035 HC TECH TIME PER 15 MIN (STAT)

## 2021-04-28 PROCEDURE — 84100 ASSAY OF PHOSPHORUS: CPT | Performed by: STUDENT IN AN ORGANIZED HEALTH CARE EDUCATION/TRAINING PROGRAM

## 2021-04-28 PROCEDURE — 85610 PROTHROMBIN TIME: CPT | Performed by: STUDENT IN AN ORGANIZED HEALTH CARE EDUCATION/TRAINING PROGRAM

## 2021-04-28 PROCEDURE — 99900031 HC PATIENT EDUCATION (STAT)

## 2021-04-28 RX ADMIN — CYCLOBENZAPRINE 10 MG: 10 TABLET, FILM COATED ORAL at 08:04

## 2021-04-28 RX ADMIN — HEPARIN SODIUM 7500 UNITS: 5000 INJECTION INTRAVENOUS; SUBCUTANEOUS at 09:04

## 2021-04-28 RX ADMIN — CEFTRIAXONE SODIUM 2 G: 2 INJECTION, POWDER, FOR SOLUTION INTRAMUSCULAR; INTRAVENOUS at 06:04

## 2021-04-28 RX ADMIN — SUCRALFATE 1 G: 1 SUSPENSION ORAL at 05:04

## 2021-04-28 RX ADMIN — OXYCODONE 5 MG: 5 TABLET ORAL at 08:04

## 2021-04-28 RX ADMIN — PREGABALIN 50 MG: 50 CAPSULE ORAL at 08:04

## 2021-04-28 RX ADMIN — CYCLOBENZAPRINE 10 MG: 10 TABLET, FILM COATED ORAL at 09:04

## 2021-04-28 RX ADMIN — SUCRALFATE 1 G: 1 SUSPENSION ORAL at 12:04

## 2021-04-28 RX ADMIN — HEPARIN SODIUM 7500 UNITS: 5000 INJECTION INTRAVENOUS; SUBCUTANEOUS at 02:04

## 2021-04-28 RX ADMIN — BACITRACIN ZINC: 500 OINTMENT TOPICAL at 08:04

## 2021-04-28 RX ADMIN — MUPIROCIN: 20 OINTMENT TOPICAL at 08:04

## 2021-04-28 RX ADMIN — TAMSULOSIN HYDROCHLORIDE 0.4 MG: 0.4 CAPSULE ORAL at 08:04

## 2021-04-28 RX ADMIN — DEXTROSE(GLUCOSE),LEVULOSE(FRUCTOSE),PHOSPHORIC ACID 1 TABLET: 1.87; 1.87; 21.5 LIQUID ORAL at 08:04

## 2021-04-28 RX ADMIN — CYCLOBENZAPRINE 10 MG: 10 TABLET, FILM COATED ORAL at 02:04

## 2021-04-28 RX ADMIN — HEPARIN SODIUM 7500 UNITS: 5000 INJECTION INTRAVENOUS; SUBCUTANEOUS at 05:04

## 2021-04-28 RX ADMIN — OXYCODONE 5 MG: 5 TABLET ORAL at 04:04

## 2021-04-28 RX ADMIN — OXYCODONE 5 MG: 5 TABLET ORAL at 12:04

## 2021-04-28 RX ADMIN — Medication 10 ML: at 12:04

## 2021-04-28 RX ADMIN — Medication 10 ML: at 05:04

## 2021-04-28 RX ADMIN — POLYETHYLENE GLYCOL 3350 17 G: 17 POWDER, FOR SOLUTION ORAL at 08:04

## 2021-04-28 RX ADMIN — NICOTINE 1 PATCH: 7 PATCH TRANSDERMAL at 08:04

## 2021-04-28 RX ADMIN — MUPIROCIN: 20 OINTMENT TOPICAL at 09:04

## 2021-04-28 RX ADMIN — OXYCODONE HYDROCHLORIDE 10 MG: 10 TABLET ORAL at 03:04

## 2021-04-28 RX ADMIN — BACITRACIN ZINC: 500 OINTMENT TOPICAL at 09:04

## 2021-04-29 PROBLEM — F17.200 TOBACCO USE DISORDER: Status: ACTIVE | Noted: 2021-04-14

## 2021-04-29 PROBLEM — F15.20 AMPHETAMINE USE DISORDER, SEVERE: Status: ACTIVE | Noted: 2021-04-15

## 2021-04-29 LAB
ALBUMIN SERPL BCP-MCNC: 2.1 G/DL (ref 3.5–5.2)
ALP SERPL-CCNC: 136 U/L (ref 55–135)
ALT SERPL W/O P-5'-P-CCNC: 36 U/L (ref 10–44)
ANION GAP SERPL CALC-SCNC: 5 MMOL/L (ref 8–16)
AST SERPL-CCNC: 55 U/L (ref 10–40)
BASOPHILS # BLD AUTO: 0.02 K/UL (ref 0–0.2)
BASOPHILS NFR BLD: 1.1 % (ref 0–1.9)
BILIRUB SERPL-MCNC: 0.9 MG/DL (ref 0.1–1)
BUN SERPL-MCNC: 8 MG/DL (ref 6–20)
CALCIUM SERPL-MCNC: 8.2 MG/DL (ref 8.7–10.5)
CHLORIDE SERPL-SCNC: 108 MMOL/L (ref 95–110)
CO2 SERPL-SCNC: 27 MMOL/L (ref 23–29)
CREAT SERPL-MCNC: 0.5 MG/DL (ref 0.5–1.4)
DIFFERENTIAL METHOD: ABNORMAL
EOSINOPHIL # BLD AUTO: 0.1 K/UL (ref 0–0.5)
EOSINOPHIL NFR BLD: 6.5 % (ref 0–8)
ERYTHROCYTE [DISTWIDTH] IN BLOOD BY AUTOMATED COUNT: 15.6 % (ref 11.5–14.5)
EST. GFR  (AFRICAN AMERICAN): >60 ML/MIN/1.73 M^2
EST. GFR  (NON AFRICAN AMERICAN): >60 ML/MIN/1.73 M^2
GLUCOSE SERPL-MCNC: 107 MG/DL (ref 70–110)
HCT VFR BLD AUTO: 30.1 % (ref 37–48.5)
HGB BLD-MCNC: 9.8 G/DL (ref 12–16)
IMM GRANULOCYTES # BLD AUTO: 0.01 K/UL (ref 0–0.04)
IMM GRANULOCYTES NFR BLD AUTO: 0.5 % (ref 0–0.5)
LYMPHOCYTES # BLD AUTO: 0.5 K/UL (ref 1–4.8)
LYMPHOCYTES NFR BLD: 25.3 % (ref 18–48)
MAGNESIUM SERPL-MCNC: 1.8 MG/DL (ref 1.6–2.6)
MCH RBC QN AUTO: 29.5 PG (ref 27–31)
MCHC RBC AUTO-ENTMCNC: 32.6 G/DL (ref 32–36)
MCV RBC AUTO: 91 FL (ref 82–98)
MONOCYTES # BLD AUTO: 0.3 K/UL (ref 0.3–1)
MONOCYTES NFR BLD: 14.5 % (ref 4–15)
NEUTROPHILS # BLD AUTO: 1 K/UL (ref 1.8–7.7)
NEUTROPHILS NFR BLD: 52.1 % (ref 38–73)
NRBC BLD-RTO: 0 /100 WBC
PHOSPHATE SERPL-MCNC: 3.5 MG/DL (ref 2.7–4.5)
PLATELET # BLD AUTO: 72 K/UL (ref 150–450)
PMV BLD AUTO: 10.2 FL (ref 9.2–12.9)
POTASSIUM SERPL-SCNC: 4.1 MMOL/L (ref 3.5–5.1)
PROT SERPL-MCNC: 6.6 G/DL (ref 6–8.4)
RBC # BLD AUTO: 3.32 M/UL (ref 4–5.4)
SODIUM SERPL-SCNC: 140 MMOL/L (ref 136–145)
WBC # BLD AUTO: 1.86 K/UL (ref 3.9–12.7)

## 2021-04-29 PROCEDURE — 84100 ASSAY OF PHOSPHORUS: CPT | Performed by: STUDENT IN AN ORGANIZED HEALTH CARE EDUCATION/TRAINING PROGRAM

## 2021-04-29 PROCEDURE — 97110 THERAPEUTIC EXERCISES: CPT

## 2021-04-29 PROCEDURE — 97530 THERAPEUTIC ACTIVITIES: CPT

## 2021-04-29 PROCEDURE — A4216 STERILE WATER/SALINE, 10 ML: HCPCS | Performed by: STUDENT IN AN ORGANIZED HEALTH CARE EDUCATION/TRAINING PROGRAM

## 2021-04-29 PROCEDURE — 63600175 PHARM REV CODE 636 W HCPCS: Performed by: STUDENT IN AN ORGANIZED HEALTH CARE EDUCATION/TRAINING PROGRAM

## 2021-04-29 PROCEDURE — S4991 NICOTINE PATCH NONLEGEND: HCPCS | Performed by: STUDENT IN AN ORGANIZED HEALTH CARE EDUCATION/TRAINING PROGRAM

## 2021-04-29 PROCEDURE — 25000003 PHARM REV CODE 250: Performed by: STUDENT IN AN ORGANIZED HEALTH CARE EDUCATION/TRAINING PROGRAM

## 2021-04-29 PROCEDURE — 80053 COMPREHEN METABOLIC PANEL: CPT | Performed by: STUDENT IN AN ORGANIZED HEALTH CARE EDUCATION/TRAINING PROGRAM

## 2021-04-29 PROCEDURE — 85025 COMPLETE CBC W/AUTO DIFF WBC: CPT | Performed by: STUDENT IN AN ORGANIZED HEALTH CARE EDUCATION/TRAINING PROGRAM

## 2021-04-29 PROCEDURE — 11000001 HC ACUTE MED/SURG PRIVATE ROOM

## 2021-04-29 PROCEDURE — 36415 COLL VENOUS BLD VENIPUNCTURE: CPT | Performed by: STUDENT IN AN ORGANIZED HEALTH CARE EDUCATION/TRAINING PROGRAM

## 2021-04-29 PROCEDURE — 97116 GAIT TRAINING THERAPY: CPT

## 2021-04-29 PROCEDURE — 97535 SELF CARE MNGMENT TRAINING: CPT

## 2021-04-29 PROCEDURE — 83735 ASSAY OF MAGNESIUM: CPT | Performed by: STUDENT IN AN ORGANIZED HEALTH CARE EDUCATION/TRAINING PROGRAM

## 2021-04-29 RX ADMIN — BACITRACIN ZINC: 500 OINTMENT TOPICAL at 09:04

## 2021-04-29 RX ADMIN — Medication 10 ML: at 05:04

## 2021-04-29 RX ADMIN — HEPARIN SODIUM 7500 UNITS: 5000 INJECTION INTRAVENOUS; SUBCUTANEOUS at 03:04

## 2021-04-29 RX ADMIN — SUCRALFATE 1 G: 1 SUSPENSION ORAL at 12:04

## 2021-04-29 RX ADMIN — OXYCODONE HYDROCHLORIDE 10 MG: 10 TABLET ORAL at 06:04

## 2021-04-29 RX ADMIN — HEPARIN SODIUM 7500 UNITS: 5000 INJECTION INTRAVENOUS; SUBCUTANEOUS at 09:04

## 2021-04-29 RX ADMIN — Medication 10 ML: at 11:04

## 2021-04-29 RX ADMIN — CEFTRIAXONE SODIUM 2 G: 2 INJECTION, POWDER, FOR SOLUTION INTRAMUSCULAR; INTRAVENOUS at 06:04

## 2021-04-29 RX ADMIN — CYCLOBENZAPRINE 10 MG: 10 TABLET, FILM COATED ORAL at 03:04

## 2021-04-29 RX ADMIN — SUCRALFATE 1 G: 1 SUSPENSION ORAL at 11:04

## 2021-04-29 RX ADMIN — CYCLOBENZAPRINE 10 MG: 10 TABLET, FILM COATED ORAL at 09:04

## 2021-04-29 RX ADMIN — HEPARIN SODIUM 7500 UNITS: 5000 INJECTION INTRAVENOUS; SUBCUTANEOUS at 06:04

## 2021-04-29 RX ADMIN — OXYCODONE HYDROCHLORIDE 10 MG: 10 TABLET ORAL at 03:04

## 2021-04-29 RX ADMIN — POLYETHYLENE GLYCOL 3350 17 G: 17 POWDER, FOR SOLUTION ORAL at 09:04

## 2021-04-29 RX ADMIN — OXYCODONE HYDROCHLORIDE 10 MG: 10 TABLET ORAL at 08:04

## 2021-04-29 RX ADMIN — DEXTROSE(GLUCOSE),LEVULOSE(FRUCTOSE),PHOSPHORIC ACID 1 TABLET: 1.87; 1.87; 21.5 LIQUID ORAL at 09:04

## 2021-04-29 RX ADMIN — BACITRACIN ZINC: 500 OINTMENT TOPICAL at 08:04

## 2021-04-29 RX ADMIN — SUCRALFATE 1 G: 1 SUSPENSION ORAL at 05:04

## 2021-04-29 RX ADMIN — OXYCODONE HYDROCHLORIDE 10 MG: 10 TABLET ORAL at 10:04

## 2021-04-29 RX ADMIN — CYCLOBENZAPRINE 10 MG: 10 TABLET, FILM COATED ORAL at 08:04

## 2021-04-29 RX ADMIN — MUPIROCIN: 20 OINTMENT TOPICAL at 09:04

## 2021-04-29 RX ADMIN — SUCRALFATE 1 G: 1 SUSPENSION ORAL at 06:04

## 2021-04-29 RX ADMIN — NICOTINE 1 PATCH: 7 PATCH TRANSDERMAL at 09:04

## 2021-04-29 RX ADMIN — PREGABALIN 50 MG: 50 CAPSULE ORAL at 09:04

## 2021-04-29 RX ADMIN — OXYCODONE HYDROCHLORIDE 10 MG: 10 TABLET ORAL at 01:04

## 2021-04-29 RX ADMIN — MUPIROCIN: 20 OINTMENT TOPICAL at 08:04

## 2021-04-29 RX ADMIN — Medication 10 ML: at 06:04

## 2021-04-29 RX ADMIN — Medication 10 ML: at 12:04

## 2021-04-29 RX ADMIN — TAMSULOSIN HYDROCHLORIDE 0.4 MG: 0.4 CAPSULE ORAL at 09:04

## 2021-04-29 RX ADMIN — PREGABALIN 50 MG: 50 CAPSULE ORAL at 08:04

## 2021-04-29 RX ADMIN — POLYETHYLENE GLYCOL 3350 17 G: 17 POWDER, FOR SOLUTION ORAL at 08:04

## 2021-04-30 LAB
ALBUMIN SERPL BCP-MCNC: 2 G/DL (ref 3.5–5.2)
ALP SERPL-CCNC: 126 U/L (ref 55–135)
ALT SERPL W/O P-5'-P-CCNC: 36 U/L (ref 10–44)
ANION GAP SERPL CALC-SCNC: 3 MMOL/L (ref 8–16)
AST SERPL-CCNC: 56 U/L (ref 10–40)
BASOPHILS # BLD AUTO: 0.01 K/UL (ref 0–0.2)
BASOPHILS NFR BLD: 0.5 % (ref 0–1.9)
BILIRUB SERPL-MCNC: 1 MG/DL (ref 0.1–1)
BUN SERPL-MCNC: 9 MG/DL (ref 6–20)
CALCIUM SERPL-MCNC: 8.6 MG/DL (ref 8.7–10.5)
CHLORIDE SERPL-SCNC: 108 MMOL/L (ref 95–110)
CO2 SERPL-SCNC: 30 MMOL/L (ref 23–29)
CREAT SERPL-MCNC: 0.4 MG/DL (ref 0.5–1.4)
DIFFERENTIAL METHOD: ABNORMAL
EOSINOPHIL # BLD AUTO: 0.1 K/UL (ref 0–0.5)
EOSINOPHIL NFR BLD: 6.3 % (ref 0–8)
ERYTHROCYTE [DISTWIDTH] IN BLOOD BY AUTOMATED COUNT: 15.4 % (ref 11.5–14.5)
EST. GFR  (AFRICAN AMERICAN): >60 ML/MIN/1.73 M^2
EST. GFR  (NON AFRICAN AMERICAN): >60 ML/MIN/1.73 M^2
GLUCOSE SERPL-MCNC: 83 MG/DL (ref 70–110)
HCT VFR BLD AUTO: 29.5 % (ref 37–48.5)
HGB BLD-MCNC: 9.3 G/DL (ref 12–16)
IMM GRANULOCYTES # BLD AUTO: 0 K/UL (ref 0–0.04)
IMM GRANULOCYTES NFR BLD AUTO: 0 % (ref 0–0.5)
LYMPHOCYTES # BLD AUTO: 0.5 K/UL (ref 1–4.8)
LYMPHOCYTES NFR BLD: 24.1 % (ref 18–48)
MAGNESIUM SERPL-MCNC: 1.8 MG/DL (ref 1.6–2.6)
MCH RBC QN AUTO: 28.8 PG (ref 27–31)
MCHC RBC AUTO-ENTMCNC: 31.5 G/DL (ref 32–36)
MCV RBC AUTO: 91 FL (ref 82–98)
MONOCYTES # BLD AUTO: 0.3 K/UL (ref 0.3–1)
MONOCYTES NFR BLD: 15.2 % (ref 4–15)
NEUTROPHILS # BLD AUTO: 1 K/UL (ref 1.8–7.7)
NEUTROPHILS NFR BLD: 53.9 % (ref 38–73)
NRBC BLD-RTO: 0 /100 WBC
PHOSPHATE SERPL-MCNC: 3.9 MG/DL (ref 2.7–4.5)
PLATELET # BLD AUTO: 75 K/UL (ref 150–450)
PMV BLD AUTO: 10.1 FL (ref 9.2–12.9)
POTASSIUM SERPL-SCNC: 4.1 MMOL/L (ref 3.5–5.1)
PROT SERPL-MCNC: 6.5 G/DL (ref 6–8.4)
RBC # BLD AUTO: 3.23 M/UL (ref 4–5.4)
SODIUM SERPL-SCNC: 141 MMOL/L (ref 136–145)
WBC # BLD AUTO: 1.91 K/UL (ref 3.9–12.7)

## 2021-04-30 PROCEDURE — 36415 COLL VENOUS BLD VENIPUNCTURE: CPT | Performed by: STUDENT IN AN ORGANIZED HEALTH CARE EDUCATION/TRAINING PROGRAM

## 2021-04-30 PROCEDURE — 97530 THERAPEUTIC ACTIVITIES: CPT

## 2021-04-30 PROCEDURE — 97116 GAIT TRAINING THERAPY: CPT

## 2021-04-30 PROCEDURE — S4991 NICOTINE PATCH NONLEGEND: HCPCS | Performed by: STUDENT IN AN ORGANIZED HEALTH CARE EDUCATION/TRAINING PROGRAM

## 2021-04-30 PROCEDURE — 25000003 PHARM REV CODE 250: Performed by: STUDENT IN AN ORGANIZED HEALTH CARE EDUCATION/TRAINING PROGRAM

## 2021-04-30 PROCEDURE — A4216 STERILE WATER/SALINE, 10 ML: HCPCS | Performed by: STUDENT IN AN ORGANIZED HEALTH CARE EDUCATION/TRAINING PROGRAM

## 2021-04-30 PROCEDURE — 97110 THERAPEUTIC EXERCISES: CPT

## 2021-04-30 PROCEDURE — 83735 ASSAY OF MAGNESIUM: CPT | Performed by: STUDENT IN AN ORGANIZED HEALTH CARE EDUCATION/TRAINING PROGRAM

## 2021-04-30 PROCEDURE — 63600175 PHARM REV CODE 636 W HCPCS: Performed by: STUDENT IN AN ORGANIZED HEALTH CARE EDUCATION/TRAINING PROGRAM

## 2021-04-30 PROCEDURE — 11000001 HC ACUTE MED/SURG PRIVATE ROOM

## 2021-04-30 PROCEDURE — 97535 SELF CARE MNGMENT TRAINING: CPT

## 2021-04-30 PROCEDURE — 84100 ASSAY OF PHOSPHORUS: CPT | Performed by: STUDENT IN AN ORGANIZED HEALTH CARE EDUCATION/TRAINING PROGRAM

## 2021-04-30 PROCEDURE — 85025 COMPLETE CBC W/AUTO DIFF WBC: CPT | Performed by: STUDENT IN AN ORGANIZED HEALTH CARE EDUCATION/TRAINING PROGRAM

## 2021-04-30 PROCEDURE — 80053 COMPREHEN METABOLIC PANEL: CPT | Performed by: STUDENT IN AN ORGANIZED HEALTH CARE EDUCATION/TRAINING PROGRAM

## 2021-04-30 RX ADMIN — TAMSULOSIN HYDROCHLORIDE 0.4 MG: 0.4 CAPSULE ORAL at 08:04

## 2021-04-30 RX ADMIN — OXYCODONE HYDROCHLORIDE 10 MG: 10 TABLET ORAL at 01:04

## 2021-04-30 RX ADMIN — OXYCODONE HYDROCHLORIDE 10 MG: 10 TABLET ORAL at 11:04

## 2021-04-30 RX ADMIN — HEPARIN SODIUM 7500 UNITS: 5000 INJECTION INTRAVENOUS; SUBCUTANEOUS at 06:04

## 2021-04-30 RX ADMIN — Medication 10 ML: at 06:04

## 2021-04-30 RX ADMIN — CYCLOBENZAPRINE 10 MG: 10 TABLET, FILM COATED ORAL at 03:04

## 2021-04-30 RX ADMIN — NICOTINE 1 PATCH: 7 PATCH TRANSDERMAL at 08:04

## 2021-04-30 RX ADMIN — SUCRALFATE 1 G: 1 SUSPENSION ORAL at 12:04

## 2021-04-30 RX ADMIN — Medication 10 ML: at 12:04

## 2021-04-30 RX ADMIN — Medication 10 ML: at 11:04

## 2021-04-30 RX ADMIN — BACITRACIN ZINC: 500 OINTMENT TOPICAL at 08:04

## 2021-04-30 RX ADMIN — BACITRACIN ZINC: 500 OINTMENT TOPICAL at 09:04

## 2021-04-30 RX ADMIN — PREGABALIN 50 MG: 50 CAPSULE ORAL at 08:04

## 2021-04-30 RX ADMIN — SUCRALFATE 1 G: 1 SUSPENSION ORAL at 05:04

## 2021-04-30 RX ADMIN — CEFTRIAXONE SODIUM 2 G: 2 INJECTION, POWDER, FOR SOLUTION INTRAMUSCULAR; INTRAVENOUS at 06:04

## 2021-04-30 RX ADMIN — DEXTROSE(GLUCOSE),LEVULOSE(FRUCTOSE),PHOSPHORIC ACID 1 TABLET: 1.87; 1.87; 21.5 LIQUID ORAL at 08:04

## 2021-04-30 RX ADMIN — SUCRALFATE 1 G: 1 SUSPENSION ORAL at 06:04

## 2021-04-30 RX ADMIN — CYCLOBENZAPRINE 10 MG: 10 TABLET, FILM COATED ORAL at 09:04

## 2021-04-30 RX ADMIN — MUPIROCIN: 20 OINTMENT TOPICAL at 08:04

## 2021-04-30 RX ADMIN — OXYCODONE HYDROCHLORIDE 10 MG: 10 TABLET ORAL at 07:04

## 2021-04-30 RX ADMIN — MUPIROCIN: 20 OINTMENT TOPICAL at 09:04

## 2021-04-30 RX ADMIN — HEPARIN SODIUM 7500 UNITS: 5000 INJECTION INTRAVENOUS; SUBCUTANEOUS at 09:04

## 2021-04-30 RX ADMIN — CYCLOBENZAPRINE 10 MG: 10 TABLET, FILM COATED ORAL at 08:04

## 2021-04-30 RX ADMIN — OXYCODONE HYDROCHLORIDE 10 MG: 10 TABLET ORAL at 06:04

## 2021-04-30 RX ADMIN — SUCRALFATE 1 G: 1 SUSPENSION ORAL at 11:04

## 2021-04-30 RX ADMIN — POLYETHYLENE GLYCOL 3350 17 G: 17 POWDER, FOR SOLUTION ORAL at 09:04

## 2021-04-30 RX ADMIN — Medication 10 ML: at 05:04

## 2021-04-30 RX ADMIN — HEPARIN SODIUM 7500 UNITS: 5000 INJECTION INTRAVENOUS; SUBCUTANEOUS at 01:04

## 2021-04-30 RX ADMIN — PREGABALIN 50 MG: 50 CAPSULE ORAL at 09:04

## 2021-04-30 RX ADMIN — OXYCODONE HYDROCHLORIDE 10 MG: 10 TABLET ORAL at 12:04

## 2021-05-01 PROCEDURE — 97530 THERAPEUTIC ACTIVITIES: CPT | Mod: CQ

## 2021-05-01 PROCEDURE — 63600175 PHARM REV CODE 636 W HCPCS: Performed by: STUDENT IN AN ORGANIZED HEALTH CARE EDUCATION/TRAINING PROGRAM

## 2021-05-01 PROCEDURE — S4991 NICOTINE PATCH NONLEGEND: HCPCS | Performed by: STUDENT IN AN ORGANIZED HEALTH CARE EDUCATION/TRAINING PROGRAM

## 2021-05-01 PROCEDURE — 97116 GAIT TRAINING THERAPY: CPT | Mod: CQ

## 2021-05-01 PROCEDURE — 11000001 HC ACUTE MED/SURG PRIVATE ROOM

## 2021-05-01 PROCEDURE — 25000003 PHARM REV CODE 250: Performed by: STUDENT IN AN ORGANIZED HEALTH CARE EDUCATION/TRAINING PROGRAM

## 2021-05-01 PROCEDURE — 97535 SELF CARE MNGMENT TRAINING: CPT

## 2021-05-01 PROCEDURE — A4216 STERILE WATER/SALINE, 10 ML: HCPCS | Performed by: STUDENT IN AN ORGANIZED HEALTH CARE EDUCATION/TRAINING PROGRAM

## 2021-05-01 PROCEDURE — 97110 THERAPEUTIC EXERCISES: CPT | Mod: CQ

## 2021-05-01 PROCEDURE — 97530 THERAPEUTIC ACTIVITIES: CPT

## 2021-05-01 PROCEDURE — 97110 THERAPEUTIC EXERCISES: CPT

## 2021-05-01 RX ADMIN — HEPARIN SODIUM 7500 UNITS: 5000 INJECTION INTRAVENOUS; SUBCUTANEOUS at 09:05

## 2021-05-01 RX ADMIN — SUCRALFATE 1 G: 1 SUSPENSION ORAL at 11:05

## 2021-05-01 RX ADMIN — SUCRALFATE 1 G: 1 SUSPENSION ORAL at 05:05

## 2021-05-01 RX ADMIN — MUPIROCIN: 20 OINTMENT TOPICAL at 08:05

## 2021-05-01 RX ADMIN — PREGABALIN 50 MG: 50 CAPSULE ORAL at 09:05

## 2021-05-01 RX ADMIN — TAMSULOSIN HYDROCHLORIDE 0.4 MG: 0.4 CAPSULE ORAL at 08:05

## 2021-05-01 RX ADMIN — OXYCODONE HYDROCHLORIDE 10 MG: 10 TABLET ORAL at 02:05

## 2021-05-01 RX ADMIN — HEPARIN SODIUM 7500 UNITS: 5000 INJECTION INTRAVENOUS; SUBCUTANEOUS at 01:05

## 2021-05-01 RX ADMIN — POLYETHYLENE GLYCOL 3350 17 G: 17 POWDER, FOR SOLUTION ORAL at 08:05

## 2021-05-01 RX ADMIN — Medication 10 ML: at 05:05

## 2021-05-01 RX ADMIN — Medication 10 ML: at 12:05

## 2021-05-01 RX ADMIN — DEXTROSE(GLUCOSE),LEVULOSE(FRUCTOSE),PHOSPHORIC ACID 1 TABLET: 1.87; 1.87; 21.5 LIQUID ORAL at 08:05

## 2021-05-01 RX ADMIN — CYCLOBENZAPRINE 10 MG: 10 TABLET, FILM COATED ORAL at 02:05

## 2021-05-01 RX ADMIN — PREGABALIN 50 MG: 50 CAPSULE ORAL at 08:05

## 2021-05-01 RX ADMIN — CYCLOBENZAPRINE 10 MG: 10 TABLET, FILM COATED ORAL at 09:05

## 2021-05-01 RX ADMIN — BACITRACIN ZINC: 500 OINTMENT TOPICAL at 09:05

## 2021-05-01 RX ADMIN — MUPIROCIN: 20 OINTMENT TOPICAL at 09:05

## 2021-05-01 RX ADMIN — Medication 10 ML: at 11:05

## 2021-05-01 RX ADMIN — OXYCODONE HYDROCHLORIDE 10 MG: 10 TABLET ORAL at 07:05

## 2021-05-01 RX ADMIN — CYCLOBENZAPRINE 10 MG: 10 TABLET, FILM COATED ORAL at 08:05

## 2021-05-01 RX ADMIN — POLYETHYLENE GLYCOL 3350 17 G: 17 POWDER, FOR SOLUTION ORAL at 09:05

## 2021-05-01 RX ADMIN — OXYCODONE HYDROCHLORIDE 10 MG: 10 TABLET ORAL at 03:05

## 2021-05-01 RX ADMIN — NICOTINE 1 PATCH: 7 PATCH TRANSDERMAL at 08:05

## 2021-05-01 RX ADMIN — HEPARIN SODIUM 7500 UNITS: 5000 INJECTION INTRAVENOUS; SUBCUTANEOUS at 05:05

## 2021-05-01 RX ADMIN — CEFTRIAXONE SODIUM 2 G: 2 INJECTION, POWDER, FOR SOLUTION INTRAMUSCULAR; INTRAVENOUS at 05:05

## 2021-05-02 PROCEDURE — 11000001 HC ACUTE MED/SURG PRIVATE ROOM

## 2021-05-02 PROCEDURE — 25000003 PHARM REV CODE 250: Performed by: STUDENT IN AN ORGANIZED HEALTH CARE EDUCATION/TRAINING PROGRAM

## 2021-05-02 PROCEDURE — S4991 NICOTINE PATCH NONLEGEND: HCPCS | Performed by: STUDENT IN AN ORGANIZED HEALTH CARE EDUCATION/TRAINING PROGRAM

## 2021-05-02 PROCEDURE — A4216 STERILE WATER/SALINE, 10 ML: HCPCS | Performed by: STUDENT IN AN ORGANIZED HEALTH CARE EDUCATION/TRAINING PROGRAM

## 2021-05-02 PROCEDURE — 63600175 PHARM REV CODE 636 W HCPCS: Performed by: STUDENT IN AN ORGANIZED HEALTH CARE EDUCATION/TRAINING PROGRAM

## 2021-05-02 RX ADMIN — POLYETHYLENE GLYCOL 3350 17 G: 17 POWDER, FOR SOLUTION ORAL at 09:05

## 2021-05-02 RX ADMIN — SUCRALFATE 1 G: 1 SUSPENSION ORAL at 11:05

## 2021-05-02 RX ADMIN — CYCLOBENZAPRINE 10 MG: 10 TABLET, FILM COATED ORAL at 02:05

## 2021-05-02 RX ADMIN — TAMSULOSIN HYDROCHLORIDE 0.4 MG: 0.4 CAPSULE ORAL at 08:05

## 2021-05-02 RX ADMIN — PREGABALIN 50 MG: 50 CAPSULE ORAL at 08:05

## 2021-05-02 RX ADMIN — OXYCODONE HYDROCHLORIDE 10 MG: 10 TABLET ORAL at 05:05

## 2021-05-02 RX ADMIN — HEPARIN SODIUM 7500 UNITS: 5000 INJECTION INTRAVENOUS; SUBCUTANEOUS at 01:05

## 2021-05-02 RX ADMIN — CYCLOBENZAPRINE 10 MG: 10 TABLET, FILM COATED ORAL at 08:05

## 2021-05-02 RX ADMIN — BACITRACIN ZINC: 500 OINTMENT TOPICAL at 09:05

## 2021-05-02 RX ADMIN — SUCRALFATE 1 G: 1 SUSPENSION ORAL at 05:05

## 2021-05-02 RX ADMIN — BACITRACIN ZINC: 500 OINTMENT TOPICAL at 08:05

## 2021-05-02 RX ADMIN — CYCLOBENZAPRINE 10 MG: 10 TABLET, FILM COATED ORAL at 09:05

## 2021-05-02 RX ADMIN — Medication 10 ML: at 05:05

## 2021-05-02 RX ADMIN — MUPIROCIN: 20 OINTMENT TOPICAL at 08:05

## 2021-05-02 RX ADMIN — Medication 10 ML: at 11:05

## 2021-05-02 RX ADMIN — PREGABALIN 50 MG: 50 CAPSULE ORAL at 09:05

## 2021-05-02 RX ADMIN — NICOTINE 1 PATCH: 7 PATCH TRANSDERMAL at 08:05

## 2021-05-02 RX ADMIN — HEPARIN SODIUM 7500 UNITS: 5000 INJECTION INTRAVENOUS; SUBCUTANEOUS at 05:05

## 2021-05-02 RX ADMIN — OXYCODONE HYDROCHLORIDE 10 MG: 10 TABLET ORAL at 10:05

## 2021-05-02 RX ADMIN — HEPARIN SODIUM 7500 UNITS: 5000 INJECTION INTRAVENOUS; SUBCUTANEOUS at 09:05

## 2021-05-02 RX ADMIN — POLYETHYLENE GLYCOL 3350 17 G: 17 POWDER, FOR SOLUTION ORAL at 08:05

## 2021-05-02 RX ADMIN — Medication 10 ML: at 08:05

## 2021-05-02 RX ADMIN — CEFTRIAXONE SODIUM 2 G: 2 INJECTION, POWDER, FOR SOLUTION INTRAMUSCULAR; INTRAVENOUS at 07:05

## 2021-05-02 RX ADMIN — OXYCODONE HYDROCHLORIDE 10 MG: 10 TABLET ORAL at 03:05

## 2021-05-02 RX ADMIN — DEXTROSE(GLUCOSE),LEVULOSE(FRUCTOSE),PHOSPHORIC ACID 1 TABLET: 1.87; 1.87; 21.5 LIQUID ORAL at 08:05

## 2021-05-03 LAB
ALBUMIN SERPL BCP-MCNC: 2.2 G/DL (ref 3.5–5.2)
ALP SERPL-CCNC: 131 U/L (ref 55–135)
ALT SERPL W/O P-5'-P-CCNC: 34 U/L (ref 10–44)
ANION GAP SERPL CALC-SCNC: 6 MMOL/L (ref 8–16)
AST SERPL-CCNC: 53 U/L (ref 10–40)
BASOPHILS # BLD AUTO: ABNORMAL K/UL (ref 0–0.2)
BASOPHILS NFR BLD: 0 % (ref 0–1.9)
BILIRUB SERPL-MCNC: 1.1 MG/DL (ref 0.1–1)
BUN SERPL-MCNC: 16 MG/DL (ref 6–20)
CALCIUM SERPL-MCNC: 8.6 MG/DL (ref 8.7–10.5)
CHLORIDE SERPL-SCNC: 107 MMOL/L (ref 95–110)
CO2 SERPL-SCNC: 29 MMOL/L (ref 23–29)
CREAT SERPL-MCNC: 0.5 MG/DL (ref 0.5–1.4)
DIFFERENTIAL METHOD: MANUAL DIFF
EOSINOPHIL # BLD AUTO: ABNORMAL K/UL (ref 0–0.5)
EOSINOPHIL NFR BLD: 6 % (ref 0–8)
ERYTHROCYTE [DISTWIDTH] IN BLOOD BY AUTOMATED COUNT: 15.3 % (ref 11.5–14.5)
EST. GFR  (AFRICAN AMERICAN): >60 ML/MIN/1.73 M^2
EST. GFR  (NON AFRICAN AMERICAN): >60 ML/MIN/1.73 M^2
GLUCOSE SERPL-MCNC: 97 MG/DL (ref 70–110)
HCT VFR BLD AUTO: 29.7 % (ref 37–48.5)
HGB BLD-MCNC: 9.3 G/DL (ref 12–16)
IMM GRANULOCYTES # BLD AUTO: ABNORMAL K/UL (ref 0–0.04)
IMM GRANULOCYTES NFR BLD AUTO: ABNORMAL % (ref 0–0.5)
LYMPHOCYTES # BLD AUTO: ABNORMAL K/UL (ref 1–4.8)
LYMPHOCYTES NFR BLD: 6 % (ref 18–48)
MAGNESIUM SERPL-MCNC: 1.8 MG/DL (ref 1.6–2.6)
MCH RBC QN AUTO: 29 PG (ref 27–31)
MCHC RBC AUTO-ENTMCNC: 31.3 G/DL (ref 32–36)
MCV RBC AUTO: 93 FL (ref 82–98)
MONOCYTES # BLD AUTO: ABNORMAL K/UL (ref 0.3–1)
MONOCYTES NFR BLD: 12 % (ref 4–15)
NEUTROPHILS NFR BLD: 66 % (ref 38–73)
NEUTS BAND NFR BLD MANUAL: 10 %
NRBC BLD-RTO: 0 /100 WBC
PHOSPHATE SERPL-MCNC: 3.9 MG/DL (ref 2.7–4.5)
PLATELET # BLD AUTO: 105 K/UL (ref 150–450)
PMV BLD AUTO: 9.6 FL (ref 9.2–12.9)
POTASSIUM SERPL-SCNC: 4 MMOL/L (ref 3.5–5.1)
PROT SERPL-MCNC: 6.9 G/DL (ref 6–8.4)
RBC # BLD AUTO: 3.21 M/UL (ref 4–5.4)
SODIUM SERPL-SCNC: 142 MMOL/L (ref 136–145)
WBC # BLD AUTO: 2.13 K/UL (ref 3.9–12.7)

## 2021-05-03 PROCEDURE — 80053 COMPREHEN METABOLIC PANEL: CPT | Performed by: NURSE PRACTITIONER

## 2021-05-03 PROCEDURE — S4991 NICOTINE PATCH NONLEGEND: HCPCS | Performed by: STUDENT IN AN ORGANIZED HEALTH CARE EDUCATION/TRAINING PROGRAM

## 2021-05-03 PROCEDURE — 97110 THERAPEUTIC EXERCISES: CPT

## 2021-05-03 PROCEDURE — 63600175 PHARM REV CODE 636 W HCPCS: Performed by: STUDENT IN AN ORGANIZED HEALTH CARE EDUCATION/TRAINING PROGRAM

## 2021-05-03 PROCEDURE — 97116 GAIT TRAINING THERAPY: CPT

## 2021-05-03 PROCEDURE — 36415 COLL VENOUS BLD VENIPUNCTURE: CPT | Performed by: NURSE PRACTITIONER

## 2021-05-03 PROCEDURE — 97535 SELF CARE MNGMENT TRAINING: CPT

## 2021-05-03 PROCEDURE — 11000001 HC ACUTE MED/SURG PRIVATE ROOM

## 2021-05-03 PROCEDURE — 25000003 PHARM REV CODE 250: Performed by: STUDENT IN AN ORGANIZED HEALTH CARE EDUCATION/TRAINING PROGRAM

## 2021-05-03 PROCEDURE — 97530 THERAPEUTIC ACTIVITIES: CPT

## 2021-05-03 PROCEDURE — A4216 STERILE WATER/SALINE, 10 ML: HCPCS | Performed by: STUDENT IN AN ORGANIZED HEALTH CARE EDUCATION/TRAINING PROGRAM

## 2021-05-03 PROCEDURE — 84100 ASSAY OF PHOSPHORUS: CPT | Performed by: NURSE PRACTITIONER

## 2021-05-03 PROCEDURE — 83735 ASSAY OF MAGNESIUM: CPT | Performed by: NURSE PRACTITIONER

## 2021-05-03 RX ADMIN — TAMSULOSIN HYDROCHLORIDE 0.4 MG: 0.4 CAPSULE ORAL at 08:05

## 2021-05-03 RX ADMIN — HEPARIN SODIUM 7500 UNITS: 5000 INJECTION INTRAVENOUS; SUBCUTANEOUS at 10:05

## 2021-05-03 RX ADMIN — SUCRALFATE 1 G: 1 SUSPENSION ORAL at 11:05

## 2021-05-03 RX ADMIN — SUCRALFATE 1 G: 1 SUSPENSION ORAL at 05:05

## 2021-05-03 RX ADMIN — CEFTRIAXONE SODIUM 2 G: 2 INJECTION, POWDER, FOR SOLUTION INTRAMUSCULAR; INTRAVENOUS at 05:05

## 2021-05-03 RX ADMIN — OXYCODONE HYDROCHLORIDE 10 MG: 10 TABLET ORAL at 08:05

## 2021-05-03 RX ADMIN — DEXTROSE(GLUCOSE),LEVULOSE(FRUCTOSE),PHOSPHORIC ACID 1 TABLET: 1.87; 1.87; 21.5 LIQUID ORAL at 08:05

## 2021-05-03 RX ADMIN — Medication 10 ML: at 05:05

## 2021-05-03 RX ADMIN — CYCLOBENZAPRINE 10 MG: 10 TABLET, FILM COATED ORAL at 08:05

## 2021-05-03 RX ADMIN — BACITRACIN ZINC: 500 OINTMENT TOPICAL at 09:05

## 2021-05-03 RX ADMIN — OXYCODONE HYDROCHLORIDE 10 MG: 10 TABLET ORAL at 02:05

## 2021-05-03 RX ADMIN — PREGABALIN 50 MG: 50 CAPSULE ORAL at 08:05

## 2021-05-03 RX ADMIN — OXYCODONE HYDROCHLORIDE 10 MG: 10 TABLET ORAL at 04:05

## 2021-05-03 RX ADMIN — HEPARIN SODIUM 7500 UNITS: 5000 INJECTION INTRAVENOUS; SUBCUTANEOUS at 02:05

## 2021-05-03 RX ADMIN — HEPARIN SODIUM 7500 UNITS: 5000 INJECTION INTRAVENOUS; SUBCUTANEOUS at 05:05

## 2021-05-03 RX ADMIN — Medication 10 ML: at 12:05

## 2021-05-03 RX ADMIN — Medication 10 ML: at 11:05

## 2021-05-03 RX ADMIN — NICOTINE 1 PATCH: 7 PATCH TRANSDERMAL at 08:05

## 2021-05-03 RX ADMIN — BACITRACIN ZINC: 500 OINTMENT TOPICAL at 08:05

## 2021-05-03 RX ADMIN — SUCRALFATE 1 G: 1 SUSPENSION ORAL at 12:05

## 2021-05-03 RX ADMIN — POLYETHYLENE GLYCOL 3350 17 G: 17 POWDER, FOR SOLUTION ORAL at 08:05

## 2021-05-03 RX ADMIN — CYCLOBENZAPRINE 10 MG: 10 TABLET, FILM COATED ORAL at 02:05

## 2021-05-03 RX ADMIN — OXYCODONE HYDROCHLORIDE 10 MG: 10 TABLET ORAL at 10:05

## 2021-05-04 ENCOUNTER — PATIENT MESSAGE (OUTPATIENT)
Dept: INFECTIOUS DISEASES | Facility: CLINIC | Age: 41
End: 2021-05-04

## 2021-05-04 ENCOUNTER — PATIENT MESSAGE (OUTPATIENT)
Dept: RESEARCH | Facility: HOSPITAL | Age: 41
End: 2021-05-04

## 2021-05-04 PROCEDURE — 97535 SELF CARE MNGMENT TRAINING: CPT

## 2021-05-04 PROCEDURE — 97116 GAIT TRAINING THERAPY: CPT

## 2021-05-04 PROCEDURE — 25000003 PHARM REV CODE 250: Performed by: INTERNAL MEDICINE

## 2021-05-04 PROCEDURE — A4216 STERILE WATER/SALINE, 10 ML: HCPCS | Performed by: STUDENT IN AN ORGANIZED HEALTH CARE EDUCATION/TRAINING PROGRAM

## 2021-05-04 PROCEDURE — 25000003 PHARM REV CODE 250: Performed by: STUDENT IN AN ORGANIZED HEALTH CARE EDUCATION/TRAINING PROGRAM

## 2021-05-04 PROCEDURE — 11000001 HC ACUTE MED/SURG PRIVATE ROOM

## 2021-05-04 PROCEDURE — S4991 NICOTINE PATCH NONLEGEND: HCPCS | Performed by: STUDENT IN AN ORGANIZED HEALTH CARE EDUCATION/TRAINING PROGRAM

## 2021-05-04 PROCEDURE — 63600175 PHARM REV CODE 636 W HCPCS: Performed by: STUDENT IN AN ORGANIZED HEALTH CARE EDUCATION/TRAINING PROGRAM

## 2021-05-04 PROCEDURE — 97530 THERAPEUTIC ACTIVITIES: CPT

## 2021-05-04 PROCEDURE — 97110 THERAPEUTIC EXERCISES: CPT

## 2021-05-04 RX ORDER — HYDROCODONE BITARTRATE AND ACETAMINOPHEN 5; 325 MG/1; MG/1
1 TABLET ORAL EVERY 6 HOURS PRN
Status: DISCONTINUED | OUTPATIENT
Start: 2021-05-04 | End: 2021-05-07 | Stop reason: HOSPADM

## 2021-05-04 RX ORDER — HYDROCODONE BITARTRATE AND ACETAMINOPHEN 10; 325 MG/1; MG/1
1 TABLET ORAL EVERY 6 HOURS PRN
Status: DISCONTINUED | OUTPATIENT
Start: 2021-05-04 | End: 2021-05-07 | Stop reason: HOSPADM

## 2021-05-04 RX ORDER — FLUCONAZOLE 150 MG/1
150 TABLET ORAL ONCE
Status: COMPLETED | OUTPATIENT
Start: 2021-05-04 | End: 2021-05-04

## 2021-05-04 RX ADMIN — HEPARIN SODIUM 7500 UNITS: 5000 INJECTION INTRAVENOUS; SUBCUTANEOUS at 09:05

## 2021-05-04 RX ADMIN — TAMSULOSIN HYDROCHLORIDE 0.4 MG: 0.4 CAPSULE ORAL at 08:05

## 2021-05-04 RX ADMIN — OXYCODONE HYDROCHLORIDE 10 MG: 10 TABLET ORAL at 05:05

## 2021-05-04 RX ADMIN — HEPARIN SODIUM 7500 UNITS: 5000 INJECTION INTRAVENOUS; SUBCUTANEOUS at 01:05

## 2021-05-04 RX ADMIN — CEFTRIAXONE SODIUM 2 G: 2 INJECTION, POWDER, FOR SOLUTION INTRAMUSCULAR; INTRAVENOUS at 06:05

## 2021-05-04 RX ADMIN — Medication 10 ML: at 05:05

## 2021-05-04 RX ADMIN — OXYCODONE HYDROCHLORIDE 10 MG: 10 TABLET ORAL at 12:05

## 2021-05-04 RX ADMIN — SUCRALFATE 1 G: 1 SUSPENSION ORAL at 05:05

## 2021-05-04 RX ADMIN — CYCLOBENZAPRINE 10 MG: 10 TABLET, FILM COATED ORAL at 09:05

## 2021-05-04 RX ADMIN — BACITRACIN ZINC: 500 OINTMENT TOPICAL at 08:05

## 2021-05-04 RX ADMIN — POLYETHYLENE GLYCOL 3350 17 G: 17 POWDER, FOR SOLUTION ORAL at 08:05

## 2021-05-04 RX ADMIN — HEPARIN SODIUM 7500 UNITS: 5000 INJECTION INTRAVENOUS; SUBCUTANEOUS at 05:05

## 2021-05-04 RX ADMIN — NICOTINE 1 PATCH: 7 PATCH TRANSDERMAL at 08:05

## 2021-05-04 RX ADMIN — OXYCODONE HYDROCHLORIDE 10 MG: 10 TABLET ORAL at 09:05

## 2021-05-04 RX ADMIN — SUCRALFATE 1 G: 1 SUSPENSION ORAL at 12:05

## 2021-05-04 RX ADMIN — PREGABALIN 50 MG: 50 CAPSULE ORAL at 08:05

## 2021-05-04 RX ADMIN — OXYCODONE HYDROCHLORIDE 10 MG: 10 TABLET ORAL at 01:05

## 2021-05-04 RX ADMIN — HYDROCODONE BITARTRATE AND ACETAMINOPHEN 1 TABLET: 10; 325 TABLET ORAL at 05:05

## 2021-05-04 RX ADMIN — FLUCONAZOLE 150 MG: 150 TABLET ORAL at 03:05

## 2021-05-04 RX ADMIN — PREGABALIN 50 MG: 50 CAPSULE ORAL at 09:05

## 2021-05-04 RX ADMIN — CYCLOBENZAPRINE 10 MG: 10 TABLET, FILM COATED ORAL at 03:05

## 2021-05-04 RX ADMIN — DEXTROSE(GLUCOSE),LEVULOSE(FRUCTOSE),PHOSPHORIC ACID 1 TABLET: 1.87; 1.87; 21.5 LIQUID ORAL at 08:05

## 2021-05-04 RX ADMIN — Medication 10 ML: at 12:05

## 2021-05-04 RX ADMIN — CYCLOBENZAPRINE 10 MG: 10 TABLET, FILM COATED ORAL at 08:05

## 2021-05-04 RX ADMIN — BACITRACIN ZINC: 500 OINTMENT TOPICAL at 09:05

## 2021-05-05 LAB — POCT GLUCOSE: 107 MG/DL (ref 70–110)

## 2021-05-05 PROCEDURE — 97530 THERAPEUTIC ACTIVITIES: CPT

## 2021-05-05 PROCEDURE — 25000003 PHARM REV CODE 250: Performed by: STUDENT IN AN ORGANIZED HEALTH CARE EDUCATION/TRAINING PROGRAM

## 2021-05-05 PROCEDURE — 97535 SELF CARE MNGMENT TRAINING: CPT

## 2021-05-05 PROCEDURE — 97116 GAIT TRAINING THERAPY: CPT

## 2021-05-05 PROCEDURE — 97110 THERAPEUTIC EXERCISES: CPT

## 2021-05-05 PROCEDURE — S4991 NICOTINE PATCH NONLEGEND: HCPCS | Performed by: STUDENT IN AN ORGANIZED HEALTH CARE EDUCATION/TRAINING PROGRAM

## 2021-05-05 PROCEDURE — 25000003 PHARM REV CODE 250: Performed by: INTERNAL MEDICINE

## 2021-05-05 PROCEDURE — 11000001 HC ACUTE MED/SURG PRIVATE ROOM

## 2021-05-05 PROCEDURE — A4216 STERILE WATER/SALINE, 10 ML: HCPCS | Performed by: STUDENT IN AN ORGANIZED HEALTH CARE EDUCATION/TRAINING PROGRAM

## 2021-05-05 PROCEDURE — 63600175 PHARM REV CODE 636 W HCPCS: Performed by: STUDENT IN AN ORGANIZED HEALTH CARE EDUCATION/TRAINING PROGRAM

## 2021-05-05 RX ADMIN — CYCLOBENZAPRINE 10 MG: 10 TABLET, FILM COATED ORAL at 02:05

## 2021-05-05 RX ADMIN — HEPARIN SODIUM 7500 UNITS: 5000 INJECTION INTRAVENOUS; SUBCUTANEOUS at 11:05

## 2021-05-05 RX ADMIN — TAMSULOSIN HYDROCHLORIDE 0.4 MG: 0.4 CAPSULE ORAL at 08:05

## 2021-05-05 RX ADMIN — SUCRALFATE 1 G: 1 SUSPENSION ORAL at 05:05

## 2021-05-05 RX ADMIN — Medication 10 ML: at 12:05

## 2021-05-05 RX ADMIN — Medication 10 ML: at 05:05

## 2021-05-05 RX ADMIN — HYDROCODONE BITARTRATE AND ACETAMINOPHEN 1 TABLET: 10; 325 TABLET ORAL at 08:05

## 2021-05-05 RX ADMIN — BACITRACIN ZINC: 500 OINTMENT TOPICAL at 09:05

## 2021-05-05 RX ADMIN — CYCLOBENZAPRINE 10 MG: 10 TABLET, FILM COATED ORAL at 08:05

## 2021-05-05 RX ADMIN — SUCRALFATE 1 G: 1 SUSPENSION ORAL at 11:05

## 2021-05-05 RX ADMIN — POLYETHYLENE GLYCOL 3350 17 G: 17 POWDER, FOR SOLUTION ORAL at 08:05

## 2021-05-05 RX ADMIN — NICOTINE 1 PATCH: 7 PATCH TRANSDERMAL at 08:05

## 2021-05-05 RX ADMIN — HYDROCODONE BITARTRATE AND ACETAMINOPHEN 1 TABLET: 10; 325 TABLET ORAL at 12:05

## 2021-05-05 RX ADMIN — HEPARIN SODIUM 7500 UNITS: 5000 INJECTION INTRAVENOUS; SUBCUTANEOUS at 02:05

## 2021-05-05 RX ADMIN — SUCRALFATE 1 G: 1 SUSPENSION ORAL at 12:05

## 2021-05-05 RX ADMIN — HYDROCODONE BITARTRATE AND ACETAMINOPHEN 1 TABLET: 10; 325 TABLET ORAL at 05:05

## 2021-05-05 RX ADMIN — POLYETHYLENE GLYCOL 3350 17 G: 17 POWDER, FOR SOLUTION ORAL at 09:05

## 2021-05-05 RX ADMIN — PREGABALIN 50 MG: 50 CAPSULE ORAL at 09:05

## 2021-05-05 RX ADMIN — PREGABALIN 50 MG: 50 CAPSULE ORAL at 08:05

## 2021-05-05 RX ADMIN — CYCLOBENZAPRINE 10 MG: 10 TABLET, FILM COATED ORAL at 09:05

## 2021-05-05 RX ADMIN — CEFTRIAXONE SODIUM 2 G: 2 INJECTION, POWDER, FOR SOLUTION INTRAMUSCULAR; INTRAVENOUS at 05:05

## 2021-05-05 RX ADMIN — DEXTROSE(GLUCOSE),LEVULOSE(FRUCTOSE),PHOSPHORIC ACID 1 TABLET: 1.87; 1.87; 21.5 LIQUID ORAL at 08:05

## 2021-05-05 RX ADMIN — HEPARIN SODIUM 7500 UNITS: 5000 INJECTION INTRAVENOUS; SUBCUTANEOUS at 05:05

## 2021-05-05 RX ADMIN — BACITRACIN ZINC: 500 OINTMENT TOPICAL at 08:05

## 2021-05-05 RX ADMIN — Medication 10 ML: at 11:05

## 2021-05-06 LAB
ALBUMIN SERPL BCP-MCNC: 2 G/DL (ref 3.5–5.2)
ALP SERPL-CCNC: 126 U/L (ref 55–135)
ALT SERPL W/O P-5'-P-CCNC: 30 U/L (ref 10–44)
ANION GAP SERPL CALC-SCNC: 4 MMOL/L (ref 8–16)
AST SERPL-CCNC: 51 U/L (ref 10–40)
BASOPHILS # BLD AUTO: 0.02 K/UL (ref 0–0.2)
BASOPHILS NFR BLD: 1.6 % (ref 0–1.9)
BILIRUB SERPL-MCNC: 0.6 MG/DL (ref 0.1–1)
BUN SERPL-MCNC: 7 MG/DL (ref 6–20)
CALCIUM SERPL-MCNC: 8.3 MG/DL (ref 8.7–10.5)
CHLORIDE SERPL-SCNC: 113 MMOL/L (ref 95–110)
CO2 SERPL-SCNC: 29 MMOL/L (ref 23–29)
CREAT SERPL-MCNC: 0.4 MG/DL (ref 0.5–1.4)
DIFFERENTIAL METHOD: ABNORMAL
EOSINOPHIL # BLD AUTO: 0 K/UL (ref 0–0.5)
EOSINOPHIL NFR BLD: 3.1 % (ref 0–8)
ERYTHROCYTE [DISTWIDTH] IN BLOOD BY AUTOMATED COUNT: 15.3 % (ref 11.5–14.5)
EST. GFR  (AFRICAN AMERICAN): >60 ML/MIN/1.73 M^2
EST. GFR  (NON AFRICAN AMERICAN): >60 ML/MIN/1.73 M^2
GLUCOSE SERPL-MCNC: 101 MG/DL (ref 70–110)
HCT VFR BLD AUTO: 28.8 % (ref 37–48.5)
HGB BLD-MCNC: 9.1 G/DL (ref 12–16)
IMM GRANULOCYTES # BLD AUTO: 0.01 K/UL (ref 0–0.04)
IMM GRANULOCYTES NFR BLD AUTO: 0.8 % (ref 0–0.5)
LYMPHOCYTES # BLD AUTO: 0.3 K/UL (ref 1–4.8)
LYMPHOCYTES NFR BLD: 24 % (ref 18–48)
MAGNESIUM SERPL-MCNC: 1.8 MG/DL (ref 1.6–2.6)
MCH RBC QN AUTO: 29 PG (ref 27–31)
MCHC RBC AUTO-ENTMCNC: 31.6 G/DL (ref 32–36)
MCV RBC AUTO: 92 FL (ref 82–98)
MONOCYTES # BLD AUTO: 0.2 K/UL (ref 0.3–1)
MONOCYTES NFR BLD: 11.6 % (ref 4–15)
NEUTROPHILS # BLD AUTO: 0.8 K/UL (ref 1.8–7.7)
NEUTROPHILS NFR BLD: 58.9 % (ref 38–73)
NRBC BLD-RTO: 0 /100 WBC
PHOSPHATE SERPL-MCNC: 3.8 MG/DL (ref 2.7–4.5)
PLATELET # BLD AUTO: 93 K/UL (ref 150–450)
PMV BLD AUTO: 9.7 FL (ref 9.2–12.9)
POTASSIUM SERPL-SCNC: 3.9 MMOL/L (ref 3.5–5.1)
PROT SERPL-MCNC: 6.4 G/DL (ref 6–8.4)
RBC # BLD AUTO: 3.14 M/UL (ref 4–5.4)
SODIUM SERPL-SCNC: 146 MMOL/L (ref 136–145)
WBC # BLD AUTO: 1.29 K/UL (ref 3.9–12.7)

## 2021-05-06 PROCEDURE — 84100 ASSAY OF PHOSPHORUS: CPT | Performed by: NURSE PRACTITIONER

## 2021-05-06 PROCEDURE — 97110 THERAPEUTIC EXERCISES: CPT

## 2021-05-06 PROCEDURE — S4991 NICOTINE PATCH NONLEGEND: HCPCS | Performed by: STUDENT IN AN ORGANIZED HEALTH CARE EDUCATION/TRAINING PROGRAM

## 2021-05-06 PROCEDURE — 25000003 PHARM REV CODE 250: Performed by: STUDENT IN AN ORGANIZED HEALTH CARE EDUCATION/TRAINING PROGRAM

## 2021-05-06 PROCEDURE — A4216 STERILE WATER/SALINE, 10 ML: HCPCS | Performed by: STUDENT IN AN ORGANIZED HEALTH CARE EDUCATION/TRAINING PROGRAM

## 2021-05-06 PROCEDURE — 63600175 PHARM REV CODE 636 W HCPCS: Performed by: STUDENT IN AN ORGANIZED HEALTH CARE EDUCATION/TRAINING PROGRAM

## 2021-05-06 PROCEDURE — 97537 COMMUNITY/WORK REINTEGRATION: CPT

## 2021-05-06 PROCEDURE — 25000003 PHARM REV CODE 250: Performed by: INTERNAL MEDICINE

## 2021-05-06 PROCEDURE — 11000001 HC ACUTE MED/SURG PRIVATE ROOM

## 2021-05-06 PROCEDURE — 97530 THERAPEUTIC ACTIVITIES: CPT

## 2021-05-06 PROCEDURE — 85025 COMPLETE CBC W/AUTO DIFF WBC: CPT | Performed by: NURSE PRACTITIONER

## 2021-05-06 PROCEDURE — 83735 ASSAY OF MAGNESIUM: CPT | Performed by: NURSE PRACTITIONER

## 2021-05-06 PROCEDURE — 97116 GAIT TRAINING THERAPY: CPT

## 2021-05-06 PROCEDURE — 97535 SELF CARE MNGMENT TRAINING: CPT

## 2021-05-06 PROCEDURE — 80053 COMPREHEN METABOLIC PANEL: CPT | Performed by: NURSE PRACTITIONER

## 2021-05-06 RX ADMIN — Medication 10 ML: at 06:05

## 2021-05-06 RX ADMIN — HYDROCODONE BITARTRATE AND ACETAMINOPHEN 1 TABLET: 10; 325 TABLET ORAL at 09:05

## 2021-05-06 RX ADMIN — HEPARIN SODIUM 7500 UNITS: 5000 INJECTION INTRAVENOUS; SUBCUTANEOUS at 10:05

## 2021-05-06 RX ADMIN — HYDROCODONE BITARTRATE AND ACETAMINOPHEN 1 TABLET: 10; 325 TABLET ORAL at 03:05

## 2021-05-06 RX ADMIN — CYCLOBENZAPRINE 10 MG: 10 TABLET, FILM COATED ORAL at 08:05

## 2021-05-06 RX ADMIN — NICOTINE 1 PATCH: 7 PATCH TRANSDERMAL at 09:05

## 2021-05-06 RX ADMIN — CYCLOBENZAPRINE 10 MG: 10 TABLET, FILM COATED ORAL at 09:05

## 2021-05-06 RX ADMIN — SUCRALFATE 1 G: 1 SUSPENSION ORAL at 11:05

## 2021-05-06 RX ADMIN — SUCRALFATE 1 G: 1 SUSPENSION ORAL at 05:05

## 2021-05-06 RX ADMIN — HYDROCODONE BITARTRATE AND ACETAMINOPHEN 1 TABLET: 10; 325 TABLET ORAL at 10:05

## 2021-05-06 RX ADMIN — BACITRACIN ZINC: 500 OINTMENT TOPICAL at 08:05

## 2021-05-06 RX ADMIN — Medication 10 ML: at 12:05

## 2021-05-06 RX ADMIN — CEFTRIAXONE SODIUM 2 G: 2 INJECTION, POWDER, FOR SOLUTION INTRAMUSCULAR; INTRAVENOUS at 05:05

## 2021-05-06 RX ADMIN — TAMSULOSIN HYDROCHLORIDE 0.4 MG: 0.4 CAPSULE ORAL at 09:05

## 2021-05-06 RX ADMIN — HEPARIN SODIUM 7500 UNITS: 5000 INJECTION INTRAVENOUS; SUBCUTANEOUS at 05:05

## 2021-05-06 RX ADMIN — PREGABALIN 50 MG: 50 CAPSULE ORAL at 08:05

## 2021-05-06 RX ADMIN — POLYETHYLENE GLYCOL 3350 17 G: 17 POWDER, FOR SOLUTION ORAL at 08:05

## 2021-05-06 RX ADMIN — CYCLOBENZAPRINE 10 MG: 10 TABLET, FILM COATED ORAL at 03:05

## 2021-05-06 RX ADMIN — BACITRACIN ZINC: 500 OINTMENT TOPICAL at 09:05

## 2021-05-06 RX ADMIN — PREGABALIN 50 MG: 50 CAPSULE ORAL at 09:05

## 2021-05-06 RX ADMIN — DEXTROSE(GLUCOSE),LEVULOSE(FRUCTOSE),PHOSPHORIC ACID 1 TABLET: 1.87; 1.87; 21.5 LIQUID ORAL at 09:05

## 2021-05-06 RX ADMIN — HEPARIN SODIUM 7500 UNITS: 5000 INJECTION INTRAVENOUS; SUBCUTANEOUS at 02:05

## 2021-05-07 VITALS
RESPIRATION RATE: 18 BRPM | DIASTOLIC BLOOD PRESSURE: 82 MMHG | WEIGHT: 293 LBS | TEMPERATURE: 98 F | OXYGEN SATURATION: 98 % | HEART RATE: 91 BPM | SYSTOLIC BLOOD PRESSURE: 135 MMHG | HEIGHT: 67 IN | BODY MASS INDEX: 45.99 KG/M2

## 2021-05-07 PROCEDURE — S4991 NICOTINE PATCH NONLEGEND: HCPCS | Performed by: STUDENT IN AN ORGANIZED HEALTH CARE EDUCATION/TRAINING PROGRAM

## 2021-05-07 PROCEDURE — A4216 STERILE WATER/SALINE, 10 ML: HCPCS | Performed by: STUDENT IN AN ORGANIZED HEALTH CARE EDUCATION/TRAINING PROGRAM

## 2021-05-07 PROCEDURE — 63600175 PHARM REV CODE 636 W HCPCS: Performed by: STUDENT IN AN ORGANIZED HEALTH CARE EDUCATION/TRAINING PROGRAM

## 2021-05-07 PROCEDURE — 25000003 PHARM REV CODE 250: Performed by: INTERNAL MEDICINE

## 2021-05-07 PROCEDURE — 25000003 PHARM REV CODE 250: Performed by: STUDENT IN AN ORGANIZED HEALTH CARE EDUCATION/TRAINING PROGRAM

## 2021-05-07 RX ORDER — POLYETHYLENE GLYCOL 3350 17 G/17G
17 POWDER, FOR SOLUTION ORAL DAILY
Qty: 30 PACKET | Refills: 0 | Status: SHIPPED | OUTPATIENT
Start: 2021-05-07 | End: 2021-06-06

## 2021-05-07 RX ORDER — PANTOPRAZOLE SODIUM 20 MG/1
40 TABLET, DELAYED RELEASE ORAL DAILY
Qty: 60 TABLET | Refills: 0 | Status: SHIPPED | OUTPATIENT
Start: 2021-05-07 | End: 2022-01-27

## 2021-05-07 RX ORDER — NICOTINE 7MG/24HR
1 PATCH, TRANSDERMAL 24 HOURS TRANSDERMAL DAILY
Qty: 2 PATCH | Refills: 0 | Status: SHIPPED | OUTPATIENT
Start: 2021-05-08 | End: 2021-05-29

## 2021-05-07 RX ORDER — HYDROCODONE BITARTRATE AND ACETAMINOPHEN 10; 325 MG/1; MG/1
1 TABLET ORAL EVERY 6 HOURS PRN
Qty: 60 TABLET | Refills: 0 | Status: SHIPPED | OUTPATIENT
Start: 2021-05-07 | End: 2021-05-17 | Stop reason: SDUPTHER

## 2021-05-07 RX ORDER — CYCLOBENZAPRINE HCL 10 MG
10 TABLET ORAL 3 TIMES DAILY PRN
Qty: 45 TABLET | Refills: 0 | Status: SHIPPED | OUTPATIENT
Start: 2021-05-07 | End: 2021-06-06

## 2021-05-07 RX ADMIN — SUCRALFATE 1 G: 1 SUSPENSION ORAL at 11:05

## 2021-05-07 RX ADMIN — NICOTINE 1 PATCH: 7 PATCH TRANSDERMAL at 08:05

## 2021-05-07 RX ADMIN — PREGABALIN 50 MG: 50 CAPSULE ORAL at 08:05

## 2021-05-07 RX ADMIN — HYDROCODONE BITARTRATE AND ACETAMINOPHEN 1 TABLET: 10; 325 TABLET ORAL at 07:05

## 2021-05-07 RX ADMIN — POLYETHYLENE GLYCOL 3350 17 G: 17 POWDER, FOR SOLUTION ORAL at 08:05

## 2021-05-07 RX ADMIN — DEXTROSE(GLUCOSE),LEVULOSE(FRUCTOSE),PHOSPHORIC ACID 1 TABLET: 1.87; 1.87; 21.5 LIQUID ORAL at 08:05

## 2021-05-07 RX ADMIN — CEFTRIAXONE SODIUM 2 G: 2 INJECTION, POWDER, FOR SOLUTION INTRAMUSCULAR; INTRAVENOUS at 05:05

## 2021-05-07 RX ADMIN — TAMSULOSIN HYDROCHLORIDE 0.4 MG: 0.4 CAPSULE ORAL at 08:05

## 2021-05-07 RX ADMIN — HEPARIN SODIUM 7500 UNITS: 5000 INJECTION INTRAVENOUS; SUBCUTANEOUS at 05:05

## 2021-05-07 RX ADMIN — Medication 10 ML: at 12:05

## 2021-05-07 RX ADMIN — Medication 10 ML: at 05:05

## 2021-05-07 RX ADMIN — CYCLOBENZAPRINE 10 MG: 10 TABLET, FILM COATED ORAL at 08:05

## 2021-05-07 RX ADMIN — SUCRALFATE 1 G: 1 SUSPENSION ORAL at 12:05

## 2021-05-07 RX ADMIN — SUCRALFATE 1 G: 1 SUSPENSION ORAL at 05:05

## 2021-05-11 ENCOUNTER — OFFICE VISIT (OUTPATIENT)
Dept: INFECTIOUS DISEASES | Facility: CLINIC | Age: 41
End: 2021-05-11
Payer: MEDICAID

## 2021-05-11 DIAGNOSIS — G06.1 ABSCESS IN EPIDURAL SPACE OF LUMBAR SPINE: Primary | ICD-10-CM

## 2021-05-11 DIAGNOSIS — Z79.2 RECEIVING INTRAVENOUS ANTIBIOTIC TREATMENT AS OUTPATIENT: ICD-10-CM

## 2021-05-11 DIAGNOSIS — R78.81 STREPTOCOCCAL BACTEREMIA: ICD-10-CM

## 2021-05-11 DIAGNOSIS — B95.5 STREPTOCOCCAL BACTEREMIA: ICD-10-CM

## 2021-05-11 DIAGNOSIS — M46.40 SPONDYLODISCITIS: ICD-10-CM

## 2021-05-11 PROCEDURE — 99214 OFFICE O/P EST MOD 30 MIN: CPT | Mod: 95,,, | Performed by: PHYSICIAN ASSISTANT

## 2021-05-11 PROCEDURE — 99214 PR OFFICE/OUTPT VISIT, EST, LEVL IV, 30-39 MIN: ICD-10-PCS | Mod: 95,,, | Performed by: PHYSICIAN ASSISTANT

## 2021-05-17 ENCOUNTER — PATIENT MESSAGE (OUTPATIENT)
Dept: NEUROSURGERY | Facility: CLINIC | Age: 41
End: 2021-05-17

## 2021-05-17 ENCOUNTER — PATIENT MESSAGE (OUTPATIENT)
Dept: INFECTIOUS DISEASES | Facility: CLINIC | Age: 41
End: 2021-05-17

## 2021-05-17 ENCOUNTER — DOCUMENTATION ONLY (OUTPATIENT)
Dept: NEUROSURGERY | Facility: HOSPITAL | Age: 41
End: 2021-05-17

## 2021-05-17 RX ORDER — HYDROCODONE BITARTRATE AND ACETAMINOPHEN 10; 325 MG/1; MG/1
1 TABLET ORAL EVERY 6 HOURS PRN
Qty: 60 TABLET | Refills: 0 | Status: SHIPPED | OUTPATIENT
Start: 2021-05-17 | End: 2021-06-01

## 2021-05-17 RX ORDER — PREGABALIN 50 MG/1
50 CAPSULE ORAL 3 TIMES DAILY
Qty: 90 CAPSULE | Refills: 6 | Status: SHIPPED | OUTPATIENT
Start: 2021-05-17 | End: 2021-05-17 | Stop reason: SDUPTHER

## 2021-05-17 RX ORDER — PREGABALIN 50 MG/1
50 CAPSULE ORAL 3 TIMES DAILY
Qty: 90 CAPSULE | Refills: 6 | Status: SHIPPED | OUTPATIENT
Start: 2021-05-17 | End: 2022-01-03 | Stop reason: SDUPTHER

## 2021-05-18 ENCOUNTER — TELEPHONE (OUTPATIENT)
Dept: INFECTIOUS DISEASES | Facility: CLINIC | Age: 41
End: 2021-05-18

## 2021-05-18 LAB
FUNGUS SPEC CULT: NORMAL

## 2021-05-19 ENCOUNTER — TELEPHONE (OUTPATIENT)
Dept: INFECTIOUS DISEASES | Facility: CLINIC | Age: 41
End: 2021-05-19

## 2021-05-25 ENCOUNTER — TELEPHONE (OUTPATIENT)
Dept: INFECTIOUS DISEASES | Facility: CLINIC | Age: 41
End: 2021-05-25

## 2021-05-25 ENCOUNTER — PATIENT MESSAGE (OUTPATIENT)
Dept: INFECTIOUS DISEASES | Facility: CLINIC | Age: 41
End: 2021-05-25

## 2021-06-10 ENCOUNTER — TELEPHONE (OUTPATIENT)
Dept: INFECTIOUS DISEASES | Facility: CLINIC | Age: 41
End: 2021-06-10

## 2021-06-11 ENCOUNTER — TELEPHONE (OUTPATIENT)
Dept: INFECTIOUS DISEASES | Facility: CLINIC | Age: 41
End: 2021-06-11

## 2021-06-17 ENCOUNTER — TELEPHONE (OUTPATIENT)
Dept: INFECTIOUS DISEASES | Facility: CLINIC | Age: 41
End: 2021-06-17

## 2021-06-18 LAB
ACID FAST MOD KINY STN SPEC: NORMAL
MYCOBACTERIUM SPEC QL CULT: NORMAL

## 2021-07-27 ENCOUNTER — HOSPITAL ENCOUNTER (EMERGENCY)
Facility: HOSPITAL | Age: 41
Discharge: HOME | End: 2021-07-27
Attending: EMERGENCY MEDICINE
Payer: MEDICAID

## 2021-07-27 VITALS
HEIGHT: 67 IN | WEIGHT: 280 LBS | TEMPERATURE: 98 F | SYSTOLIC BLOOD PRESSURE: 143 MMHG | DIASTOLIC BLOOD PRESSURE: 82 MMHG | OXYGEN SATURATION: 100 % | RESPIRATION RATE: 18 BRPM | BODY MASS INDEX: 43.95 KG/M2 | HEART RATE: 88 BPM

## 2021-07-27 DIAGNOSIS — N20.1 CALCULUS OF URETER: Primary | ICD-10-CM

## 2021-07-27 LAB
ALBUMIN SERPL BCP-MCNC: 3.1 G/DL (ref 3.5–5.2)
ALP SERPL-CCNC: 138 U/L (ref 55–135)
ALT SERPL W/O P-5'-P-CCNC: 37 U/L (ref 10–44)
AMPHET+METHAMPHET UR QL: ABNORMAL
ANION GAP SERPL CALC-SCNC: 5 MMOL/L (ref 8–16)
AST SERPL-CCNC: 52 U/L (ref 10–40)
BACTERIA #/AREA URNS HPF: NEGATIVE /HPF
BARBITURATES UR QL SCN>200 NG/ML: NEGATIVE
BASOPHILS # BLD AUTO: 0.01 K/UL (ref 0–0.2)
BASOPHILS NFR BLD: 0.4 % (ref 0–1.9)
BENZODIAZ UR QL SCN>200 NG/ML: NEGATIVE
BILIRUB SERPL-MCNC: 1.2 MG/DL (ref 0.1–1)
BILIRUB UR QL STRIP: NEGATIVE
BUN SERPL-MCNC: 9 MG/DL (ref 6–20)
BZE UR QL SCN: NEGATIVE
CALCIUM SERPL-MCNC: 8.6 MG/DL (ref 8.7–10.5)
CANNABINOIDS UR QL SCN: NEGATIVE
CHLORIDE SERPL-SCNC: 111 MMOL/L (ref 95–110)
CLARITY UR: CLEAR
CO2 SERPL-SCNC: 29 MMOL/L (ref 23–29)
COLOR UR: YELLOW
CREAT SERPL-MCNC: 0.6 MG/DL (ref 0.5–1.4)
CREAT UR-MCNC: <13 MG/DL (ref 15–325)
DIFFERENTIAL METHOD: ABNORMAL
EOSINOPHIL # BLD AUTO: 0 K/UL (ref 0–0.5)
EOSINOPHIL NFR BLD: 1.3 % (ref 0–8)
ERYTHROCYTE [DISTWIDTH] IN BLOOD BY AUTOMATED COUNT: 16.1 % (ref 11.5–14.5)
EST. GFR  (AFRICAN AMERICAN): >60 ML/MIN/1.73 M^2
EST. GFR  (NON AFRICAN AMERICAN): >60 ML/MIN/1.73 M^2
GLUCOSE SERPL-MCNC: 110 MG/DL (ref 70–110)
GLUCOSE UR QL STRIP: NEGATIVE
HCG INTACT+B SERPL-ACNC: <1 MIU/ML
HCT VFR BLD AUTO: 38.7 % (ref 37–48.5)
HGB BLD-MCNC: 12.2 G/DL (ref 12–16)
HGB UR QL STRIP: ABNORMAL
HYALINE CASTS #/AREA URNS LPF: 2 /LPF
IMM GRANULOCYTES # BLD AUTO: 0 K/UL (ref 0–0.04)
IMM GRANULOCYTES NFR BLD AUTO: 0 % (ref 0–0.5)
KETONES UR QL STRIP: NEGATIVE
LACTATE SERPL-SCNC: 0.9 MMOL/L (ref 0.5–2.2)
LEUKOCYTE ESTERASE UR QL STRIP: ABNORMAL
LIPASE SERPL-CCNC: 172 U/L (ref 23–300)
LYMPHOCYTES # BLD AUTO: 0.4 K/UL (ref 1–4.8)
LYMPHOCYTES NFR BLD: 15 % (ref 18–48)
MCH RBC QN AUTO: 27.9 PG (ref 27–31)
MCHC RBC AUTO-ENTMCNC: 31.5 G/DL (ref 32–36)
MCV RBC AUTO: 88 FL (ref 82–98)
METHADONE UR QL SCN>300 NG/ML: NEGATIVE
MICROSCOPIC COMMENT: ABNORMAL
MONOCYTES # BLD AUTO: 0.1 K/UL (ref 0.3–1)
MONOCYTES NFR BLD: 5.6 % (ref 4–15)
NEUTROPHILS # BLD AUTO: 1.8 K/UL (ref 1.8–7.7)
NEUTROPHILS NFR BLD: 77.7 % (ref 38–73)
NITRITE UR QL STRIP: NEGATIVE
NRBC BLD-RTO: 0 /100 WBC
OPIATES UR QL SCN: NEGATIVE
PCP UR QL SCN>25 NG/ML: NEGATIVE
PH UR STRIP: 8 [PH] (ref 5–8)
PLATELET # BLD AUTO: 65 K/UL (ref 150–450)
PMV BLD AUTO: 10.3 FL (ref 9.2–12.9)
POTASSIUM SERPL-SCNC: 3.7 MMOL/L (ref 3.5–5.1)
PROT SERPL-MCNC: 7.9 G/DL (ref 6–8.4)
PROT UR QL STRIP: NEGATIVE
RBC # BLD AUTO: 4.38 M/UL (ref 4–5.4)
RBC #/AREA URNS HPF: 27 /HPF (ref 0–4)
SODIUM SERPL-SCNC: 145 MMOL/L (ref 136–145)
SP GR UR STRIP: <=1.005 (ref 1–1.03)
SQUAMOUS #/AREA URNS HPF: 1 /HPF
TOXICOLOGY INFORMATION: ABNORMAL
URN SPEC COLLECT METH UR: ABNORMAL
UROBILINOGEN UR STRIP-ACNC: 1 EU/DL
WBC # BLD AUTO: 2.34 K/UL (ref 3.9–12.7)
WBC #/AREA URNS HPF: 7 /HPF (ref 0–5)

## 2021-07-27 PROCEDURE — 36415 COLL VENOUS BLD VENIPUNCTURE: CPT | Performed by: EMERGENCY MEDICINE

## 2021-07-27 PROCEDURE — 84702 CHORIONIC GONADOTROPIN TEST: CPT | Performed by: EMERGENCY MEDICINE

## 2021-07-27 PROCEDURE — 80307 DRUG TEST PRSMV CHEM ANLYZR: CPT | Performed by: EMERGENCY MEDICINE

## 2021-07-27 PROCEDURE — 25000003 PHARM REV CODE 250: Performed by: EMERGENCY MEDICINE

## 2021-07-27 PROCEDURE — 63600175 PHARM REV CODE 636 W HCPCS: Performed by: EMERGENCY MEDICINE

## 2021-07-27 PROCEDURE — 25500020 PHARM REV CODE 255: Performed by: EMERGENCY MEDICINE

## 2021-07-27 PROCEDURE — 96374 THER/PROPH/DIAG INJ IV PUSH: CPT | Mod: 59

## 2021-07-27 PROCEDURE — 99285 EMERGENCY DEPT VISIT HI MDM: CPT | Mod: 25

## 2021-07-27 PROCEDURE — 83605 ASSAY OF LACTIC ACID: CPT | Performed by: EMERGENCY MEDICINE

## 2021-07-27 PROCEDURE — 83690 ASSAY OF LIPASE: CPT | Performed by: EMERGENCY MEDICINE

## 2021-07-27 PROCEDURE — 85025 COMPLETE CBC W/AUTO DIFF WBC: CPT | Performed by: EMERGENCY MEDICINE

## 2021-07-27 PROCEDURE — 80053 COMPREHEN METABOLIC PANEL: CPT | Performed by: EMERGENCY MEDICINE

## 2021-07-27 PROCEDURE — 96361 HYDRATE IV INFUSION ADD-ON: CPT

## 2021-07-27 PROCEDURE — 81000 URINALYSIS NONAUTO W/SCOPE: CPT | Mod: 59 | Performed by: EMERGENCY MEDICINE

## 2021-07-27 PROCEDURE — 96375 TX/PRO/DX INJ NEW DRUG ADDON: CPT

## 2021-07-27 RX ORDER — ONDANSETRON 2 MG/ML
4 INJECTION INTRAMUSCULAR; INTRAVENOUS
Status: COMPLETED | OUTPATIENT
Start: 2021-07-27 | End: 2021-07-27

## 2021-07-27 RX ORDER — TAMSULOSIN HYDROCHLORIDE 0.4 MG/1
0.4 CAPSULE ORAL DAILY
Qty: 10 CAPSULE | Refills: 0 | Status: SHIPPED | OUTPATIENT
Start: 2021-07-27 | End: 2022-01-27

## 2021-07-27 RX ORDER — DIPHENHYDRAMINE HYDROCHLORIDE 50 MG/ML
25 INJECTION INTRAMUSCULAR; INTRAVENOUS
Status: COMPLETED | OUTPATIENT
Start: 2021-07-27 | End: 2021-07-27

## 2021-07-27 RX ORDER — METHYLPREDNISOLONE SOD SUCC 125 MG
125 VIAL (EA) INJECTION
Status: COMPLETED | OUTPATIENT
Start: 2021-07-27 | End: 2021-07-27

## 2021-07-27 RX ORDER — MORPHINE SULFATE 4 MG/ML
4 INJECTION, SOLUTION INTRAMUSCULAR; INTRAVENOUS
Status: COMPLETED | OUTPATIENT
Start: 2021-07-27 | End: 2021-07-27

## 2021-07-27 RX ORDER — HYDROCODONE BITARTRATE AND ACETAMINOPHEN 5; 325 MG/1; MG/1
1 TABLET ORAL EVERY 6 HOURS PRN
Qty: 14 TABLET | Refills: 0 | Status: SHIPPED | OUTPATIENT
Start: 2021-07-27 | End: 2022-01-26

## 2021-07-27 RX ADMIN — IOHEXOL 100 ML: 350 INJECTION, SOLUTION INTRAVENOUS at 07:07

## 2021-07-27 RX ADMIN — MORPHINE SULFATE 4 MG: 4 INJECTION, SOLUTION INTRAMUSCULAR; INTRAVENOUS at 05:07

## 2021-07-27 RX ADMIN — METHYLPREDNISOLONE SODIUM SUCCINATE 125 MG: 125 INJECTION, POWDER, FOR SOLUTION INTRAMUSCULAR; INTRAVENOUS at 06:07

## 2021-07-27 RX ADMIN — ONDANSETRON 4 MG: 2 INJECTION INTRAMUSCULAR; INTRAVENOUS at 05:07

## 2021-07-27 RX ADMIN — SODIUM CHLORIDE 1000 ML: 9 INJECTION, SOLUTION INTRAVENOUS at 05:07

## 2021-07-27 RX ADMIN — DIPHENHYDRAMINE HYDROCHLORIDE 25 MG: 50 INJECTION INTRAMUSCULAR; INTRAVENOUS at 06:07

## 2021-07-28 ENCOUNTER — HOSPITAL ENCOUNTER (EMERGENCY)
Facility: HOSPITAL | Age: 41
Discharge: HOME | End: 2021-07-28
Attending: FAMILY MEDICINE
Payer: MEDICAID

## 2021-07-28 VITALS
DIASTOLIC BLOOD PRESSURE: 76 MMHG | RESPIRATION RATE: 20 BRPM | HEART RATE: 91 BPM | BODY MASS INDEX: 44.94 KG/M2 | HEIGHT: 67 IN | WEIGHT: 286.31 LBS | OXYGEN SATURATION: 99 % | SYSTOLIC BLOOD PRESSURE: 136 MMHG | TEMPERATURE: 98 F

## 2021-07-28 DIAGNOSIS — N13.1 HYDRONEPHROSIS DUE TO OBSTRUCTION OF URETER: ICD-10-CM

## 2021-07-28 DIAGNOSIS — N20.1 URETEROLITHIASIS: Primary | ICD-10-CM

## 2021-07-28 LAB
ALBUMIN SERPL BCP-MCNC: 3.4 G/DL (ref 3.5–5.2)
ALP SERPL-CCNC: 121 U/L (ref 55–135)
ALT SERPL W/O P-5'-P-CCNC: 39 U/L (ref 10–44)
ANION GAP SERPL CALC-SCNC: 8 MMOL/L (ref 8–16)
AST SERPL-CCNC: 47 U/L (ref 10–40)
BASOPHILS # BLD AUTO: 0.02 K/UL (ref 0–0.2)
BASOPHILS NFR BLD: 0.7 % (ref 0–1.9)
BILIRUB SERPL-MCNC: 2.3 MG/DL (ref 0.1–1)
BUN SERPL-MCNC: 13 MG/DL (ref 6–20)
CALCIUM SERPL-MCNC: 8.8 MG/DL (ref 8.7–10.5)
CHLORIDE SERPL-SCNC: 109 MMOL/L (ref 95–110)
CO2 SERPL-SCNC: 27 MMOL/L (ref 23–29)
CREAT SERPL-MCNC: 0.8 MG/DL (ref 0.5–1.4)
DIFFERENTIAL METHOD: ABNORMAL
EOSINOPHIL # BLD AUTO: 0 K/UL (ref 0–0.5)
EOSINOPHIL NFR BLD: 0.7 % (ref 0–8)
ERYTHROCYTE [DISTWIDTH] IN BLOOD BY AUTOMATED COUNT: 16.2 % (ref 11.5–14.5)
EST. GFR  (AFRICAN AMERICAN): >60 ML/MIN/1.73 M^2
EST. GFR  (NON AFRICAN AMERICAN): >60 ML/MIN/1.73 M^2
GLUCOSE SERPL-MCNC: 106 MG/DL (ref 70–110)
HCT VFR BLD AUTO: 40.1 % (ref 37–48.5)
HGB BLD-MCNC: 12.9 G/DL (ref 12–16)
IMM GRANULOCYTES # BLD AUTO: 0.01 K/UL (ref 0–0.04)
IMM GRANULOCYTES NFR BLD AUTO: 0.4 % (ref 0–0.5)
LYMPHOCYTES # BLD AUTO: 0.3 K/UL (ref 1–4.8)
LYMPHOCYTES NFR BLD: 12 % (ref 18–48)
MCH RBC QN AUTO: 28.4 PG (ref 27–31)
MCHC RBC AUTO-ENTMCNC: 32.2 G/DL (ref 32–36)
MCV RBC AUTO: 88 FL (ref 82–98)
MONOCYTES # BLD AUTO: 0.3 K/UL (ref 0.3–1)
MONOCYTES NFR BLD: 9.5 % (ref 4–15)
NEUTROPHILS # BLD AUTO: 2.1 K/UL (ref 1.8–7.7)
NEUTROPHILS NFR BLD: 76.7 % (ref 38–73)
NRBC BLD-RTO: 0 /100 WBC
PLATELET # BLD AUTO: 61 K/UL (ref 150–450)
PMV BLD AUTO: 9.3 FL (ref 9.2–12.9)
POTASSIUM SERPL-SCNC: 3.3 MMOL/L (ref 3.5–5.1)
PROT SERPL-MCNC: 8.5 G/DL (ref 6–8.4)
RBC # BLD AUTO: 4.55 M/UL (ref 4–5.4)
SODIUM SERPL-SCNC: 144 MMOL/L (ref 136–145)
WBC # BLD AUTO: 2.74 K/UL (ref 3.9–12.7)

## 2021-07-28 PROCEDURE — 25000003 PHARM REV CODE 250: Performed by: FAMILY MEDICINE

## 2021-07-28 PROCEDURE — 96361 HYDRATE IV INFUSION ADD-ON: CPT

## 2021-07-28 PROCEDURE — 80053 COMPREHEN METABOLIC PANEL: CPT | Performed by: FAMILY MEDICINE

## 2021-07-28 PROCEDURE — 85025 COMPLETE CBC W/AUTO DIFF WBC: CPT | Performed by: FAMILY MEDICINE

## 2021-07-28 PROCEDURE — 96375 TX/PRO/DX INJ NEW DRUG ADDON: CPT

## 2021-07-28 PROCEDURE — 96374 THER/PROPH/DIAG INJ IV PUSH: CPT

## 2021-07-28 PROCEDURE — 36415 COLL VENOUS BLD VENIPUNCTURE: CPT | Performed by: FAMILY MEDICINE

## 2021-07-28 PROCEDURE — 63600175 PHARM REV CODE 636 W HCPCS: Performed by: FAMILY MEDICINE

## 2021-07-28 PROCEDURE — 99284 EMERGENCY DEPT VISIT MOD MDM: CPT | Mod: 25

## 2021-07-28 RX ORDER — KETOROLAC TROMETHAMINE 30 MG/ML
30 INJECTION, SOLUTION INTRAMUSCULAR; INTRAVENOUS
Status: COMPLETED | OUTPATIENT
Start: 2021-07-28 | End: 2021-07-28

## 2021-07-28 RX ORDER — CEPHALEXIN 500 MG/1
500 CAPSULE ORAL 4 TIMES DAILY
Qty: 20 CAPSULE | Refills: 0 | Status: SHIPPED | OUTPATIENT
Start: 2021-07-28 | End: 2021-08-02

## 2021-07-28 RX ORDER — MORPHINE SULFATE 4 MG/ML
4 INJECTION, SOLUTION INTRAMUSCULAR; INTRAVENOUS
Status: COMPLETED | OUTPATIENT
Start: 2021-07-28 | End: 2021-07-28

## 2021-07-28 RX ORDER — KETOROLAC TROMETHAMINE 10 MG/1
10 TABLET, FILM COATED ORAL EVERY 6 HOURS
Qty: 20 TABLET | Refills: 0 | Status: SHIPPED | OUTPATIENT
Start: 2021-07-28 | End: 2021-08-02

## 2021-07-28 RX ADMIN — SODIUM CHLORIDE 1000 ML: 9 INJECTION, SOLUTION INTRAVENOUS at 12:07

## 2021-07-28 RX ADMIN — MORPHINE SULFATE 4 MG: 4 INJECTION, SOLUTION INTRAMUSCULAR; INTRAVENOUS at 12:07

## 2021-07-28 RX ADMIN — KETOROLAC TROMETHAMINE 30 MG: 30 INJECTION, SOLUTION INTRAMUSCULAR at 12:07

## 2021-08-13 ENCOUNTER — TELEPHONE (OUTPATIENT)
Dept: NEUROSURGERY | Facility: CLINIC | Age: 41
End: 2021-08-13

## 2021-08-16 ENCOUNTER — TELEPHONE (OUTPATIENT)
Dept: NEUROSURGERY | Facility: CLINIC | Age: 41
End: 2021-08-16

## 2021-08-18 ENCOUNTER — TELEPHONE (OUTPATIENT)
Dept: NEUROSURGERY | Facility: CLINIC | Age: 41
End: 2021-08-18

## 2021-12-29 ENCOUNTER — HOSPITAL ENCOUNTER (EMERGENCY)
Facility: HOSPITAL | Age: 41
Discharge: HOME | End: 2021-12-29
Attending: EMERGENCY MEDICINE
Payer: MEDICAID

## 2021-12-29 VITALS
DIASTOLIC BLOOD PRESSURE: 82 MMHG | HEART RATE: 107 BPM | TEMPERATURE: 99 F | WEIGHT: 260 LBS | SYSTOLIC BLOOD PRESSURE: 125 MMHG | RESPIRATION RATE: 18 BRPM | OXYGEN SATURATION: 99 % | BODY MASS INDEX: 40.72 KG/M2

## 2021-12-29 DIAGNOSIS — M54.9 CHRONIC BACK PAIN, UNSPECIFIED BACK LOCATION, UNSPECIFIED BACK PAIN LATERALITY: Primary | ICD-10-CM

## 2021-12-29 DIAGNOSIS — G89.29 CHRONIC BACK PAIN, UNSPECIFIED BACK LOCATION, UNSPECIFIED BACK PAIN LATERALITY: Primary | ICD-10-CM

## 2021-12-29 PROCEDURE — 63600175 PHARM REV CODE 636 W HCPCS: Performed by: EMERGENCY MEDICINE

## 2021-12-29 PROCEDURE — 25000003 PHARM REV CODE 250: Performed by: EMERGENCY MEDICINE

## 2021-12-29 PROCEDURE — 99284 EMERGENCY DEPT VISIT MOD MDM: CPT | Mod: 25

## 2021-12-29 PROCEDURE — 96372 THER/PROPH/DIAG INJ SC/IM: CPT

## 2021-12-29 RX ORDER — HYDROMORPHONE HYDROCHLORIDE 2 MG/ML
2 INJECTION, SOLUTION INTRAMUSCULAR; INTRAVENOUS; SUBCUTANEOUS
Status: COMPLETED | OUTPATIENT
Start: 2021-12-29 | End: 2021-12-29

## 2021-12-29 RX ORDER — ONDANSETRON 4 MG/1
8 TABLET, ORALLY DISINTEGRATING ORAL
Status: COMPLETED | OUTPATIENT
Start: 2021-12-29 | End: 2021-12-29

## 2021-12-29 RX ADMIN — HYDROMORPHONE HYDROCHLORIDE 2 MG: 2 INJECTION, SOLUTION INTRAMUSCULAR; INTRAVENOUS; SUBCUTANEOUS at 07:12

## 2021-12-29 RX ADMIN — ONDANSETRON 8 MG: 4 TABLET, ORALLY DISINTEGRATING ORAL at 07:12

## 2021-12-29 NOTE — ED PROVIDER NOTES
Encounter Date: 12/29/2021       History     Chief Complaint   Patient presents with    Back Pain     Patient to the ER per EMS for lower back pain that radiates down legs onset July.     This is a 41-year-old female with history of chronic back pain, sciatica states she had a fusion beginning of this year, history of epidural abscess as well.  Patient complaining of chronic low back pain that has been there since July.  Denies any new trauma.  States pain is the same.  No issues with bladder or bowel function.  Denies fever.  Patient states this pain is her normal chronic low back pain with sciatica.  She is not ill appearing, alert and oriented x4, GCS is 15        Review of patient's allergies indicates:   Allergen Reactions    Bee venom protein (honey bee) Anaphylaxis     Patient reports she is allergic to bee stings.   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  Meth- Injection, 1/w, injection in the right hand and wrist. Denies shared needles with  "other people, but occasional reuse at times. In addition endorses using a "fresh point" for sites of injection. Last use 3 days prior that was inhal     Review of Systems   Constitutional: Negative for fever.   HENT: Negative for sore throat.    Respiratory: Negative for shortness of breath.    Cardiovascular: Negative for chest pain.   Gastrointestinal: Negative for nausea.   Genitourinary: Negative for dysuria.   Musculoskeletal: Positive for back pain.   Skin: Negative for rash.   Neurological: Negative for weakness.   Hematological: Does not bruise/bleed easily.   All other systems reviewed and are negative.      Physical Exam     Initial Vitals [12/29/21 0659]   BP Pulse Resp Temp SpO2   125/82 107 18 98.5 °F (36.9 °C) 99 %      MAP       --         Physical Exam    Nursing note and vitals reviewed.  Constitutional: She appears well-developed and well-nourished. She is not diaphoretic. No distress.   HENT:   Head: Normocephalic and atraumatic.   Eyes: Conjunctivae and EOM are normal. Pupils are equal, round, and reactive to light.   Neck: Neck supple.   Normal range of motion.  Cardiovascular: Normal rate, regular rhythm, normal heart sounds and intact distal pulses.   No murmur heard.  Pulmonary/Chest: Breath sounds normal. No respiratory distress. She has no wheezes. She has no rhonchi. She has no rales. She exhibits no tenderness.   Abdominal: Abdomen is soft. Bowel sounds are normal.   Musculoskeletal:         General: Tenderness present. No edema. Normal range of motion.      Cervical back: Normal range of motion and neck supple.      Comments: Right-sided muscular low back pain that radiates into her right leg.  Neurovascularly intact.  Strong pulses.     Neurological: She is alert and oriented to person, place, and time. She has normal strength. No cranial nerve deficit. GCS score is 15. GCS eye subscore is 4. GCS verbal subscore is 5. GCS motor subscore is 6.   Skin: Skin is warm and dry. Capillary " refill takes less than 2 seconds.         ED Course   Procedures  Labs Reviewed - No data to display       Imaging Results    None          Medications   HYDROmorphone (PF) injection 2 mg (2 mg Intramuscular Given 12/29/21 0714)   ondansetron disintegrating tablet 8 mg (8 mg Oral Given 12/29/21 0713)     Medical Decision Making:   Differential Diagnosis:   Chronic low back pain, chronic sciatic her issues             ED Course as of 12/29/21 0806   Wed Dec 29, 2021   0805 Patient reports much relief after medication [SD]      ED Course User Index  [SD] Gerardo Nolan MD             Clinical Impression:   Final diagnoses:  [M54.9, G89.29] Chronic back pain, unspecified back location, unspecified back pain laterality (Primary)          ED Disposition Condition    Discharge Stable        ED Prescriptions     None        Follow-up Information     Follow up With Specialties Details Why Contact Info    Primary care physician  In 2 days             Gerardo Nolan MD  12/29/21 0730       Gerardo Nolan MD  12/29/21 0806

## 2021-12-29 NOTE — ED NOTES
"Patient screaming for significant other to help her into wheelchair. Refusing to allow me to assist her. Patient unable to fit into wheelchair provided with assistance of SO. Patient tells me to "move the fucking wheelchair" and proceeds to walk to the bathroom without assistance. Patient walks back into ED room and out of ER with SO.  "

## 2022-01-03 ENCOUNTER — PATIENT MESSAGE (OUTPATIENT)
Dept: SPINE | Facility: CLINIC | Age: 42
End: 2022-01-03
Payer: MEDICAID

## 2022-01-03 RX ORDER — PREGABALIN 50 MG/1
50 CAPSULE ORAL 3 TIMES DAILY
Qty: 90 CAPSULE | Refills: 6 | Status: ON HOLD | OUTPATIENT
Start: 2022-01-03 | End: 2022-03-11 | Stop reason: HOSPADM

## 2022-01-04 ENCOUNTER — NURSE TRIAGE (OUTPATIENT)
Dept: ADMINISTRATIVE | Facility: CLINIC | Age: 42
End: 2022-01-04
Payer: MEDICAID

## 2022-01-04 NOTE — TELEPHONE ENCOUNTER
Reason for Disposition   SEVERE back pain (e.g., excruciating, unable to do any normal activities) and not improved after pain medicine and CARE ADVICE    Additional Information   Negative: Passed out (i.e., fainted, collapsed and was not responding)   Negative: Shock suspected (e.g., cold/pale/clammy skin, too weak to stand, low BP, rapid pulse)   Negative: Sounds like a life-threatening emergency to the triager   Negative: SEVERE back pain of sudden onset and age > 60 years   Negative: SEVERE abdominal pain (e.g., excruciating)   Negative: Abdominal pain and age > 60 years   Negative: Unable to urinate (or only a few drops) and bladder feels very full   Negative: Loss of bladder or bowel control (urine or bowel incontinence; wetting self, leaking stool) of new-onset   Negative: Numbness (loss of sensation) in groin or rectal area   Negative: Fever > 100.4 F (38.0 C) and flank pain   Negative: Pain or burning with passing urine (urination)    Protocols used: BACK PAIN-A-OH

## 2022-01-04 NOTE — TELEPHONE ENCOUNTER
Pt calling with bad back pain that that starts in lower back and down through legs to feet bilat. States PRS 9/10. Pt has been in ED several times this week. Pt needs to be seen in office today. Attempted to transfer to central scheduling pt disconnected after a several minute wait time.

## 2022-01-06 ENCOUNTER — TELEPHONE (OUTPATIENT)
Dept: NEUROSURGERY | Facility: CLINIC | Age: 42
End: 2022-01-06
Payer: MEDICAID

## 2022-01-06 DIAGNOSIS — M48.061 SPINAL STENOSIS AT L4-L5 LEVEL: Primary | ICD-10-CM

## 2022-01-06 DIAGNOSIS — M43.27 FUSION OF SPINE, LUMBOSACRAL REGION: ICD-10-CM

## 2022-01-07 ENCOUNTER — TELEPHONE (OUTPATIENT)
Dept: NEUROSURGERY | Facility: CLINIC | Age: 42
End: 2022-01-07
Payer: MEDICAID

## 2022-01-07 NOTE — TELEPHONE ENCOUNTER
----- Message from Sachi Thompson RN sent at 1/6/2022  4:26 PM CST -----  Regarding: FW: Appointment Access  Contact: Pt [phone]    ----- Message -----  From: Jessica Mendez  Sent: 1/6/2022   1:39 PM CST  To: Yok[facility] Han Staff  Subject: Appointment Access                               Patient is calling Dr. Templeton'  office in regards to Sachi calling to scheduling follow up imaging and appts. Please call. [phone]

## 2022-01-19 ENCOUNTER — PATIENT MESSAGE (OUTPATIENT)
Dept: INTERNAL MEDICINE | Facility: CLINIC | Age: 42
End: 2022-01-19

## 2022-01-26 ENCOUNTER — OFFICE VISIT (OUTPATIENT)
Dept: PRIMARY CARE CLINIC | Facility: CLINIC | Age: 42
End: 2022-01-26
Payer: MEDICAID

## 2022-01-26 VITALS
HEART RATE: 110 BPM | DIASTOLIC BLOOD PRESSURE: 66 MMHG | HEIGHT: 67 IN | OXYGEN SATURATION: 100 % | SYSTOLIC BLOOD PRESSURE: 135 MMHG | WEIGHT: 290.88 LBS | BODY MASS INDEX: 45.65 KG/M2

## 2022-01-26 DIAGNOSIS — K59.00 CONSTIPATION, UNSPECIFIED CONSTIPATION TYPE: Primary | ICD-10-CM

## 2022-01-26 DIAGNOSIS — Z13.6 ENCOUNTER FOR LIPID SCREENING FOR CARDIOVASCULAR DISEASE: ICD-10-CM

## 2022-01-26 DIAGNOSIS — Z13.220 ENCOUNTER FOR LIPID SCREENING FOR CARDIOVASCULAR DISEASE: ICD-10-CM

## 2022-01-26 DIAGNOSIS — G89.29 CHRONIC MIDLINE LOW BACK PAIN WITH SCIATICA, SCIATICA LATERALITY UNSPECIFIED: ICD-10-CM

## 2022-01-26 DIAGNOSIS — Z23 INFLUENZA VACCINE ADMINISTERED: ICD-10-CM

## 2022-01-26 DIAGNOSIS — K74.60 HEPATIC CIRRHOSIS, UNSPECIFIED HEPATIC CIRRHOSIS TYPE, UNSPECIFIED WHETHER ASCITES PRESENT: ICD-10-CM

## 2022-01-26 DIAGNOSIS — K74.60 CIRRHOSIS OF LIVER WITHOUT ASCITES, UNSPECIFIED HEPATIC CIRRHOSIS TYPE: ICD-10-CM

## 2022-01-26 DIAGNOSIS — M54.40 CHRONIC MIDLINE LOW BACK PAIN WITH SCIATICA, SCIATICA LATERALITY UNSPECIFIED: ICD-10-CM

## 2022-01-26 DIAGNOSIS — Z12.39 ENCOUNTER FOR SCREENING FOR MALIGNANT NEOPLASM OF BREAST, UNSPECIFIED SCREENING MODALITY: ICD-10-CM

## 2022-01-26 DIAGNOSIS — D61.818 PANCYTOPENIA: ICD-10-CM

## 2022-01-26 PROCEDURE — 99213 OFFICE O/P EST LOW 20 MIN: CPT | Mod: PBBFAC,PN | Performed by: STUDENT IN AN ORGANIZED HEALTH CARE EDUCATION/TRAINING PROGRAM

## 2022-01-26 PROCEDURE — 3078F DIAST BP <80 MM HG: CPT | Mod: CPTII,,, | Performed by: STUDENT IN AN ORGANIZED HEALTH CARE EDUCATION/TRAINING PROGRAM

## 2022-01-26 PROCEDURE — 3008F PR BODY MASS INDEX (BMI) DOCUMENTED: ICD-10-PCS | Mod: CPTII,,, | Performed by: STUDENT IN AN ORGANIZED HEALTH CARE EDUCATION/TRAINING PROGRAM

## 2022-01-26 PROCEDURE — 3078F PR MOST RECENT DIASTOLIC BLOOD PRESSURE < 80 MM HG: ICD-10-PCS | Mod: CPTII,,, | Performed by: STUDENT IN AN ORGANIZED HEALTH CARE EDUCATION/TRAINING PROGRAM

## 2022-01-26 PROCEDURE — 99204 OFFICE O/P NEW MOD 45 MIN: CPT | Mod: 25,S$PBB,, | Performed by: STUDENT IN AN ORGANIZED HEALTH CARE EDUCATION/TRAINING PROGRAM

## 2022-01-26 PROCEDURE — 3075F SYST BP GE 130 - 139MM HG: CPT | Mod: CPTII,,, | Performed by: STUDENT IN AN ORGANIZED HEALTH CARE EDUCATION/TRAINING PROGRAM

## 2022-01-26 PROCEDURE — 99204 PR OFFICE/OUTPT VISIT, NEW, LEVL IV, 45-59 MIN: ICD-10-PCS | Mod: 25,S$PBB,, | Performed by: STUDENT IN AN ORGANIZED HEALTH CARE EDUCATION/TRAINING PROGRAM

## 2022-01-26 PROCEDURE — 99999 PR PBB SHADOW E&M-EST. PATIENT-LVL III: ICD-10-PCS | Mod: PBBFAC,,, | Performed by: STUDENT IN AN ORGANIZED HEALTH CARE EDUCATION/TRAINING PROGRAM

## 2022-01-26 PROCEDURE — 1160F PR REVIEW ALL MEDS BY PRESCRIBER/CLIN PHARMACIST DOCUMENTED: ICD-10-PCS | Mod: CPTII,,, | Performed by: STUDENT IN AN ORGANIZED HEALTH CARE EDUCATION/TRAINING PROGRAM

## 2022-01-26 PROCEDURE — 3008F BODY MASS INDEX DOCD: CPT | Mod: CPTII,,, | Performed by: STUDENT IN AN ORGANIZED HEALTH CARE EDUCATION/TRAINING PROGRAM

## 2022-01-26 PROCEDURE — 1160F RVW MEDS BY RX/DR IN RCRD: CPT | Mod: CPTII,,, | Performed by: STUDENT IN AN ORGANIZED HEALTH CARE EDUCATION/TRAINING PROGRAM

## 2022-01-26 PROCEDURE — 90471 FLU VACCINE (QUAD) GREATER THAN OR EQUAL TO 3YO PRESERVATIVE FREE IM: ICD-10-PCS | Mod: ,,, | Performed by: STUDENT IN AN ORGANIZED HEALTH CARE EDUCATION/TRAINING PROGRAM

## 2022-01-26 PROCEDURE — 99999 PR PBB SHADOW E&M-EST. PATIENT-LVL III: CPT | Mod: PBBFAC,,, | Performed by: STUDENT IN AN ORGANIZED HEALTH CARE EDUCATION/TRAINING PROGRAM

## 2022-01-26 PROCEDURE — 1159F MED LIST DOCD IN RCRD: CPT | Mod: CPTII,,, | Performed by: STUDENT IN AN ORGANIZED HEALTH CARE EDUCATION/TRAINING PROGRAM

## 2022-01-26 PROCEDURE — 1159F PR MEDICATION LIST DOCUMENTED IN MEDICAL RECORD: ICD-10-PCS | Mod: CPTII,,, | Performed by: STUDENT IN AN ORGANIZED HEALTH CARE EDUCATION/TRAINING PROGRAM

## 2022-01-26 PROCEDURE — 90471 IMMUNIZATION ADMIN: CPT | Mod: ,,, | Performed by: STUDENT IN AN ORGANIZED HEALTH CARE EDUCATION/TRAINING PROGRAM

## 2022-01-26 PROCEDURE — 90686 IIV4 VACC NO PRSV 0.5 ML IM: CPT | Mod: PBBFAC,PN | Performed by: STUDENT IN AN ORGANIZED HEALTH CARE EDUCATION/TRAINING PROGRAM

## 2022-01-26 PROCEDURE — 3075F PR MOST RECENT SYSTOLIC BLOOD PRESS GE 130-139MM HG: ICD-10-PCS | Mod: CPTII,,, | Performed by: STUDENT IN AN ORGANIZED HEALTH CARE EDUCATION/TRAINING PROGRAM

## 2022-01-26 RX ORDER — POLYETHYLENE GLYCOL 3350 17 G/17G
17 POWDER, FOR SOLUTION ORAL DAILY
Qty: 510 G | Refills: 0 | Status: SHIPPED | OUTPATIENT
Start: 2022-01-26 | End: 2022-04-22 | Stop reason: SDUPTHER

## 2022-01-27 PROBLEM — G89.29 CHRONIC MIDLINE LOW BACK PAIN WITH SCIATICA: Status: ACTIVE | Noted: 2022-01-27

## 2022-01-27 PROBLEM — Z13.220 ENCOUNTER FOR LIPID SCREENING FOR CARDIOVASCULAR DISEASE: Status: ACTIVE | Noted: 2022-01-27

## 2022-01-27 PROBLEM — M54.40 CHRONIC MIDLINE LOW BACK PAIN WITH SCIATICA: Status: ACTIVE | Noted: 2022-01-27

## 2022-01-27 PROBLEM — Z13.6 ENCOUNTER FOR LIPID SCREENING FOR CARDIOVASCULAR DISEASE: Status: ACTIVE | Noted: 2022-01-27

## 2022-01-27 PROBLEM — Z12.39 ENCOUNTER FOR SCREENING FOR MALIGNANT NEOPLASM OF BREAST: Status: ACTIVE | Noted: 2022-01-27

## 2022-01-27 NOTE — ASSESSMENT & PLAN NOTE
Per patient she has untreated hepatitis C. On physical exam no acute tenderness over right upper quadrant. Will obtain labs and follow up with ultrasound accordingly.

## 2022-01-27 NOTE — ASSESSMENT & PLAN NOTE
Multifactorial, developed an epidural abscess back in April 2021, s/p fusion of the spine in lumbosacral region on 4/16/21. Incomplete PT/OT followed up. Patient also endorses having gained over 50 pounds since this time as she has become more sedentary secondary to pain. Her current pain management is smoking marijuana. No other narcotics use. Patient has follow up appointment with neurosurgery with scheduled CT imaging on 2/14/22.   - start Tylenol 650 mg and ibuprofen 200 mg every 8-12 hours daily  - start core building exercises at home that is low impact to help strengthen body  - apply warm/heat pad over area of discomfort in lumbar region 15-20 minutes at a time   - continue application of voltaren gel as needed  - f/u two weeks

## 2022-01-27 NOTE — PROGRESS NOTES
[facility] Note    Chief Complaint      Chief Complaint   Patient presents with    [facility]     Ms. Zazueta is 41 year old female here to \[facility]\"" care complains of back pain with shooting nerve pain in both legs. She cannot stand for a long duration as she starts to feel numbness and tingling in both legs.   Her pain today is 8 out of 10 on scale. She is currently ambulating with walking cane and states she is recovering from an epidural abscess that developed in her lower back resulting hospitalization and fusion on 4/16/21. Also reports she has liver cirrhosis, hepatitis C that is untreated.    Back Pain     Denies urinary or stool incontinence. Pain in both legs quite debilitating for her to be able to walk longer distances other than getting out of the house.     Health maintenance     She is not up to date with COVID19 vaccine.   She would like to get influenza vaccine today.   Patient cannot recall her last PAP smear.       History of Present Illness      Rachel Zazueta is a 41 y.o. female who presents today for [facility] (Ms. Zazueta is 41 year old female here to \[facility]\"" care complains of back pain with shooting nerve pain in both legs. She cannot stand for a long duration as she starts to feel numbness and tingling in both legs. /Her pain today is 8 out of 10 on scale. She is currently ambulating with walking cane and states she is recovering from an epidural abscess that developed in her lower back resulting hospitalization and fusion on 4/16/21. Also reports she has liver cirrhosis, hepatitis C that is untreated.), Back Pain (Denies urinary or stool incontinence. Pain in both legs quite debilitating for her to be able to walk longer distances other than getting out of the house. ), and Health maintenance (She is not up to date with COVID19 vaccine. /She would like to get influenza vaccine today. /Patient cannot recall her last PAP smear.)   .    Past Medical  "History:  Past Medical History:   Diagnosis Date    Anemia     Hep C w/o coma, chronic     Liver cirrhosis        Past Surgical History:   has a past surgical history that includes Cholecystectomy; benine tumor removal; Back surgery; and Kidney surgery.    Family History:  family history includes Drug abuse in her mother; Hepatitis in her mother.     Social History:  Social History     Tobacco Use    Smoking status: Current Some Day Smoker     Packs/day: 0.25     Years: 23.00     Pack years: 5.75     Types: Cigarettes    Smokeless tobacco: Never Used   Substance Use Topics    Alcohol use: Not Currently    Drug use: Yes     Frequency: 4.0 times per week     Types: Methamphetamines, Marijuana     Comment: Cannibis 1/month.  Meth- Injection, 1/w, injection in the right hand and wrist. Denies shared needles with other people, but occasional reuse at times. In addition endorses using a "fresh point" for sites of injection. Last use 3 days prior that was inhal       I personally reviewed all past medical, surgical, social and family history.    Review of Systems   Constitutional: Negative for chills, fever, malaise/fatigue and weight loss.   HENT: Negative for congestion, ear discharge, ear pain, sinus pain and sore throat.    Eyes: Negative for blurred vision, pain, discharge and redness.   Respiratory: Negative for cough, hemoptysis, sputum production, shortness of breath, wheezing and stridor.    Cardiovascular: Negative for chest pain, palpitations and leg swelling.   Gastrointestinal: Negative for abdominal pain, blood in stool, constipation, diarrhea, nausea and vomiting.   Genitourinary: Negative for dysuria, frequency and hematuria.   Musculoskeletal: Positive for back pain and joint pain (knees bilaterally). Negative for falls, myalgias and neck pain.   Skin: Negative.  Negative for rash.   Neurological: Negative for dizziness, tingling, focal weakness, seizures, weakness and headaches. "   Endo/Heme/Allergies: Negative for environmental allergies and polydipsia. Does not bruise/bleed easily.   Psychiatric/Behavioral: Negative for depression and suicidal ideas. The patient is not nervous/anxious.         Medications:  Outpatient Encounter Medications as of 1/26/2022   Medication Sig Dispense Refill    diclofenac sodium (VOLTAREN) 1 % Gel Apply 2 g topically 4 (four) times daily. 50 g 0    pregabalin (LYRICA) 50 MG capsule Take 1 capsule (50 mg total) by mouth 3 (three) times daily. 90 capsule 6    polyethylene glycol (GLYCOLAX) 17 gram/dose powder Take 17 g by mouth once daily. 510 g 0    [DISCONTINUED] bacitracin zinc 500 unit/gram OiPk Apply topically 2 (two) times daily.      [DISCONTINUED] cefTRIAXone (ROCEPHIN) 2 g/50 mL PgBk IVPB Inject 50 mLs (2 g total) into the vein once daily.      [DISCONTINUED] HYDROcodone-acetaminophen (NORCO) 5-325 mg per tablet Take 1 tablet by mouth every 6 (six) hours as needed for Pain. 14 tablet 0    [DISCONTINUED] pantoprazole (PROTONIX) 20 MG tablet Take 2 tablets (40 mg total) by mouth once daily. Take on empty stomach at least 30 minutes prior to eating or drinking anything 60 tablet 0    [DISCONTINUED] senna-docusate 8.6-50 mg (PERICOLACE) 8.6-50 mg per tablet Take 1 tablet by mouth once daily.      [DISCONTINUED] tamsulosin (FLOMAX) 0.4 mg Cap Take 1 capsule (0.4 mg total) by mouth once daily. 10 capsule 0     No facility-administered encounter medications on file as of 1/26/2022.       Allergies:  Review of patient's allergies indicates:   Allergen Reactions    Bee venom protein (honey bee) Anaphylaxis     Patient reports she is allergic to bee stings.    Wasp sting [allergen ext-venom-honey bee] Anaphylaxis    Adhesive Blisters    Strawberry Hives     Patient reports itching and hives    Iodine and iodide containing products Hives    Naproxen Other (See Comments)     Throat closing    Shellfish containing products     Nuts [tree nut] Rash  "      Health Maintenance:  Immunization History   Administered Date(s) Administered    COVID-19, MRNA, LN-S, PF (Pfizer) (Purple Cap) 04/30/2021    Influenza - Quadrivalent - PF *Preferred* (6 months and older) 10/05/2018    Pneumococcal Conjugate - 13 Valent 10/05/2018    Tdap 08/19/2018      Health Maintenance   Topic Date Due    Lipid Panel  Never done    Mammogram  Never done    TETANUS VACCINE  08/19/2028    Hepatitis C Screening  Completed     Health Maintenance Topics with due status: Not Due       Topic Last Completion Date    TETANUS VACCINE 08/19/2018     Health Maintenance Due   Topic Date Due    Lipid Panel  Never done    Mammogram  Never done    Cervical Cancer Screening  Never done    Pneumococcal Vaccines (Age 0-64) (1 of 2 - PPSV23) 11/30/2018    COVID-19 Vaccine (2 - Pfizer 3-dose series) 05/21/2021    Influenza Vaccine (1) 09/01/2021     Physical Exam      Vital Signs  Pulse: 110  SpO2: 100 %  BP: 135/66  BP Location: Right arm  Patient Position: Sitting  Pain Score:   8  Height and Weight  Height: 5' 7" (170.2 cm)  Weight: 131.9 kg (290 lb 14.4 oz)  BSA (Calculated - sq m): 2.5 sq meters  BMI (Calculated): 45.6  Weight in (lb) to have BMI = 25: 159.3]    Physical Exam  Vitals reviewed.   Constitutional:       General: She is not in acute distress.     Appearance: Normal appearance. She is not ill-appearing or toxic-appearing.   HENT:      Head: Normocephalic and atraumatic.      Right Ear: External ear normal.      Left Ear: External ear normal.      Nose: Nose normal.      Mouth/Throat:      Mouth: Mucous membranes are moist.      Pharynx: Oropharynx is clear.   Eyes:      Extraocular Movements: Extraocular movements intact.      Conjunctiva/sclera: Conjunctivae normal.   Cardiovascular:      Rate and Rhythm: Normal rate and regular rhythm.      Pulses: Normal pulses.      Heart sounds: Normal heart sounds.   Pulmonary:      Effort: Pulmonary effort is normal. No respiratory " distress.      Breath sounds: No stridor. No wheezing, rhonchi or rales.   Abdominal:      General: Abdomen is flat. Bowel sounds are normal.      Palpations: Abdomen is soft.      Tenderness: There is no right CVA tenderness or left CVA tenderness.   Musculoskeletal:         General: Deformity present. No swelling or tenderness.      Cervical back: Normal range of motion and neck supple. No rigidity or tenderness.      Right lower leg: No edema.      Left lower leg: No edema.   Skin:     General: Skin is warm and dry.      Capillary Refill: Capillary refill takes less than 2 seconds.      Comments: Well healed midline scar over lumbar region, no erythema, no edema, tender to touch extending out of paraspinals bilaterally   Neurological:      General: No focal deficit present.      Mental Status: She is alert and oriented to person, place, and time. Mental status is at baseline.      Motor: No weakness.      Gait: Gait abnormal (requires walking cane for support).   Psychiatric:         Mood and Affect: Mood normal.         Behavior: Behavior normal.         Thought Content: Thought content normal.         Judgment: Judgment normal.       Assessment/Plan     Rachel Zazueta is a 41 y.o.female with:    Problem List Items Addressed This Visit        Cardiac/Vascular    Encounter for lipid screening for cardiovascular disease    Relevant Orders    Lipid Panel       Renal/    Encounter for screening for malignant neoplasm of breast    Relevant Orders    Mammo Digital Screening Bilat       ID    Influenza vaccine administered    Relevant Orders    Influenza - Quadrivalent (PF)       Oncology    Pancytopenia    Current Assessment & Plan     States severe anemia in the past. She reports hemoglobin level has dropped to 2 and had an extensive follow up with hematology. Today denies symptoms. Patient is not taking any supplements, including iron. Reports she is constantly chewing on ice.  Will check CBC and iron studies.           Relevant Orders    CBC Auto Differential    Ferritin    Iron and TIBC    Vitamin B12       GI    Cirrhosis of liver without ascites    Current Assessment & Plan     Per patient she has untreated hepatitis C. On physical exam no acute tenderness over right upper quadrant. Will obtain labs and follow up with ultrasound accordingly.            Relevant Orders    Gamma GT    Constipation - Primary    Current Assessment & Plan     Chronic. Reviewed current diet, encouraged increasing daily food intake to incorporate 6-8 servings of vegetables and fruits. Start daily miralax maintaining daily adequate hydration with 2L of fluid volume.            Relevant Medications    polyethylene glycol (GLYCOLAX) 17 gram/dose powder       Orthopedic    Chronic midline low back pain with sciatica    Current Assessment & Plan     Multifactorial, developed an epidural abscess back in April 2021, s/p fusion of the spine in lumbosacral region on 4/16/21. Incomplete PT/OT followed up. Patient also endorses having gained over 50 pounds since this time as she has become more sedentary secondary to pain. Her current pain management is smoking marijuana. No other narcotics use. Patient has follow up appointment with neurosurgery with scheduled CT imaging on 2/14/22.   - start Tylenol 650 mg and ibuprofen 200 mg every 8-12 hours daily  - start core building exercises at home that is low impact to help strengthen body  - apply warm/heat pad over area of discomfort in lumbar region 15-20 minutes at a time   - continue application of voltaren gel as needed  - f/u two weeks             Other Visit Diagnoses     Hepatic cirrhosis, unspecified hepatic cirrhosis type, unspecified whether ascites present        Relevant Orders    Comprehensive Metabolic Panel    Protime-INR        Other than changes above, cont current medications and maintain follow up with specialists.  Follow up in about 2 weeks (around 2/9/2022).    Future Appointments    Date Time Provider Department Center   2/9/2022  8:30 AM Dannielle Merino DO [facility] PRICAR [facility]   2/14/2022 12:45 PM [facility] OIC-CT1 [address] LIMIT [facility]   2/14/2022  2:30 PM Guille Templeton MD [facility] NEUROS8 [address]       DO [facility]

## 2022-01-27 NOTE — ASSESSMENT & PLAN NOTE
States severe anemia in the past. She reports hemoglobin level has dropped to 2 and had an extensive follow up with hematology. Today denies symptoms. Patient is not taking any supplements, including iron. Reports she is constantly chewing on ice.  Will check CBC and iron studies.

## 2022-01-27 NOTE — ASSESSMENT & PLAN NOTE
Chronic. Reviewed current diet, encouraged increasing daily food intake to incorporate 6-8 servings of vegetables and fruits. Start daily miralax maintaining daily adequate hydration with 2L of fluid volume.

## 2022-02-07 ENCOUNTER — LAB VISIT (OUTPATIENT)
Dept: LAB | Facility: HOSPITAL | Age: 42
End: 2022-02-07
Attending: STUDENT IN AN ORGANIZED HEALTH CARE EDUCATION/TRAINING PROGRAM
Payer: MEDICAID

## 2022-02-07 DIAGNOSIS — D61.818 PANCYTOPENIA: ICD-10-CM

## 2022-02-07 DIAGNOSIS — Z13.220 ENCOUNTER FOR LIPID SCREENING FOR CARDIOVASCULAR DISEASE: ICD-10-CM

## 2022-02-07 DIAGNOSIS — Z13.6 ENCOUNTER FOR LIPID SCREENING FOR CARDIOVASCULAR DISEASE: ICD-10-CM

## 2022-02-07 DIAGNOSIS — K74.60 HEPATIC CIRRHOSIS, UNSPECIFIED HEPATIC CIRRHOSIS TYPE, UNSPECIFIED WHETHER ASCITES PRESENT: ICD-10-CM

## 2022-02-07 DIAGNOSIS — K74.60 CIRRHOSIS OF LIVER WITHOUT ASCITES, UNSPECIFIED HEPATIC CIRRHOSIS TYPE: ICD-10-CM

## 2022-02-07 LAB
ALBUMIN SERPL BCP-MCNC: 2 G/DL (ref 3.5–5.2)
ALP SERPL-CCNC: 128 U/L (ref 55–135)
ALT SERPL W/O P-5'-P-CCNC: 22 U/L (ref 10–44)
ANION GAP SERPL CALC-SCNC: 3 MMOL/L (ref 8–16)
AST SERPL-CCNC: 45 U/L (ref 10–40)
BASOPHILS # BLD AUTO: 0.03 K/UL (ref 0–0.2)
BASOPHILS NFR BLD: 1.4 % (ref 0–1.9)
BILIRUB SERPL-MCNC: 1.2 MG/DL (ref 0.1–1)
BUN SERPL-MCNC: 9 MG/DL (ref 6–20)
CALCIUM SERPL-MCNC: 8 MG/DL (ref 8.7–10.5)
CHLORIDE SERPL-SCNC: 108 MMOL/L (ref 95–110)
CHOLEST SERPL-MCNC: 68 MG/DL (ref 120–199)
CHOLEST/HDLC SERPL: 2.5 {RATIO} (ref 2–5)
CO2 SERPL-SCNC: 29 MMOL/L (ref 23–29)
CREAT SERPL-MCNC: 0.6 MG/DL (ref 0.5–1.4)
DIFFERENTIAL METHOD: ABNORMAL
EOSINOPHIL # BLD AUTO: 0.1 K/UL (ref 0–0.5)
EOSINOPHIL NFR BLD: 3.6 % (ref 0–8)
ERYTHROCYTE [DISTWIDTH] IN BLOOD BY AUTOMATED COUNT: 17.1 % (ref 11.5–14.5)
EST. GFR  (AFRICAN AMERICAN): >60 ML/MIN/1.73 M^2
EST. GFR  (NON AFRICAN AMERICAN): >60 ML/MIN/1.73 M^2
FERRITIN SERPL-MCNC: 126 NG/ML (ref 20–300)
GGT SERPL-CCNC: 57 U/L (ref 8–55)
GLUCOSE SERPL-MCNC: 116 MG/DL (ref 70–110)
HCT VFR BLD AUTO: 29.2 % (ref 37–48.5)
HDLC SERPL-MCNC: 27 MG/DL (ref 40–75)
HDLC SERPL: 39.7 % (ref 20–50)
HGB BLD-MCNC: 9.3 G/DL (ref 12–16)
IMM GRANULOCYTES # BLD AUTO: 0.01 K/UL (ref 0–0.04)
IMM GRANULOCYTES NFR BLD AUTO: 0.5 % (ref 0–0.5)
INR PPP: 1.3 (ref 0.8–1.2)
IRON SATN MFR SERPL: 36 % (ref 20–50)
IRON SERPL-MCNC: 77 UG/DL (ref 30–160)
LDLC SERPL CALC-MCNC: 31 MG/DL (ref 63–159)
LYMPHOCYTES # BLD AUTO: 0.5 K/UL (ref 1–4.8)
LYMPHOCYTES NFR BLD: 20.3 % (ref 18–48)
MCH RBC QN AUTO: 27.5 PG (ref 27–31)
MCHC RBC AUTO-ENTMCNC: 31.8 G/DL (ref 32–36)
MCV RBC AUTO: 86 FL (ref 82–98)
MONOCYTES # BLD AUTO: 0.2 K/UL (ref 0.3–1)
MONOCYTES NFR BLD: 8.1 % (ref 4–15)
NEUTROPHILS # BLD AUTO: 1.5 K/UL (ref 1.8–7.7)
NEUTROPHILS NFR BLD: 66.1 % (ref 38–73)
NONHDLC SERPL-MCNC: 41 MG/DL
NRBC BLD-RTO: 0 /100 WBC
PLATELET # BLD AUTO: 90 K/UL (ref 150–450)
PMV BLD AUTO: 8.6 FL (ref 9.2–12.9)
POTASSIUM SERPL-SCNC: 3.9 MMOL/L (ref 3.5–5.1)
PROT SERPL-MCNC: 7.9 G/DL (ref 6–8.4)
PROTHROMBIN TIME: 14.1 SEC (ref 9–12.5)
RBC # BLD AUTO: 3.38 M/UL (ref 4–5.4)
SODIUM SERPL-SCNC: 140 MMOL/L (ref 136–145)
TOTAL IRON BINDING CAPACITY: 214 UG/DL (ref 250–450)
TRIGL SERPL-MCNC: 50 MG/DL (ref 30–150)
VIT B12 SERPL-MCNC: 703 PG/ML (ref 210–950)
WBC # BLD AUTO: 2.22 K/UL (ref 3.9–12.7)

## 2022-02-07 PROCEDURE — 82607 VITAMIN B-12: CPT | Performed by: STUDENT IN AN ORGANIZED HEALTH CARE EDUCATION/TRAINING PROGRAM

## 2022-02-07 PROCEDURE — 82977 ASSAY OF GGT: CPT | Performed by: STUDENT IN AN ORGANIZED HEALTH CARE EDUCATION/TRAINING PROGRAM

## 2022-02-07 PROCEDURE — 36415 COLL VENOUS BLD VENIPUNCTURE: CPT | Performed by: STUDENT IN AN ORGANIZED HEALTH CARE EDUCATION/TRAINING PROGRAM

## 2022-02-07 PROCEDURE — 80053 COMPREHEN METABOLIC PANEL: CPT | Performed by: STUDENT IN AN ORGANIZED HEALTH CARE EDUCATION/TRAINING PROGRAM

## 2022-02-07 PROCEDURE — 82728 ASSAY OF FERRITIN: CPT | Performed by: STUDENT IN AN ORGANIZED HEALTH CARE EDUCATION/TRAINING PROGRAM

## 2022-02-07 PROCEDURE — 85025 COMPLETE CBC W/AUTO DIFF WBC: CPT | Performed by: STUDENT IN AN ORGANIZED HEALTH CARE EDUCATION/TRAINING PROGRAM

## 2022-02-07 PROCEDURE — 80061 LIPID PANEL: CPT | Performed by: STUDENT IN AN ORGANIZED HEALTH CARE EDUCATION/TRAINING PROGRAM

## 2022-02-07 PROCEDURE — 83540 ASSAY OF IRON: CPT | Performed by: STUDENT IN AN ORGANIZED HEALTH CARE EDUCATION/TRAINING PROGRAM

## 2022-02-07 PROCEDURE — 85610 PROTHROMBIN TIME: CPT | Performed by: STUDENT IN AN ORGANIZED HEALTH CARE EDUCATION/TRAINING PROGRAM

## 2022-02-09 ENCOUNTER — PATIENT MESSAGE (OUTPATIENT)
Dept: PRIMARY CARE CLINIC | Facility: CLINIC | Age: 42
End: 2022-02-09

## 2022-02-09 ENCOUNTER — OFFICE VISIT (OUTPATIENT)
Dept: PRIMARY CARE CLINIC | Facility: CLINIC | Age: 42
End: 2022-02-09
Payer: MEDICAID

## 2022-02-09 VITALS
WEIGHT: 290 LBS | TEMPERATURE: 98 F | OXYGEN SATURATION: 96 % | SYSTOLIC BLOOD PRESSURE: 147 MMHG | BODY MASS INDEX: 45.52 KG/M2 | DIASTOLIC BLOOD PRESSURE: 71 MMHG | HEIGHT: 67 IN | HEART RATE: 99 BPM

## 2022-02-09 DIAGNOSIS — F17.200 TOBACCO USE DISORDER: ICD-10-CM

## 2022-02-09 DIAGNOSIS — D61.818 PANCYTOPENIA: Primary | ICD-10-CM

## 2022-02-09 DIAGNOSIS — D69.6 THROMBOCYTOPENIA: ICD-10-CM

## 2022-02-09 DIAGNOSIS — B18.2 CHRONIC HEPATITIS C WITH CIRRHOSIS: ICD-10-CM

## 2022-02-09 DIAGNOSIS — R79.89 ABNORMAL LFTS: ICD-10-CM

## 2022-02-09 DIAGNOSIS — F33.1 MODERATE EPISODE OF RECURRENT MAJOR DEPRESSIVE DISORDER: ICD-10-CM

## 2022-02-09 DIAGNOSIS — K74.60 CHRONIC HEPATITIS C WITH CIRRHOSIS: ICD-10-CM

## 2022-02-09 PROCEDURE — 99213 OFFICE O/P EST LOW 20 MIN: CPT | Mod: S$PBB,,, | Performed by: STUDENT IN AN ORGANIZED HEALTH CARE EDUCATION/TRAINING PROGRAM

## 2022-02-09 PROCEDURE — 1159F MED LIST DOCD IN RCRD: CPT | Mod: CPTII,,, | Performed by: STUDENT IN AN ORGANIZED HEALTH CARE EDUCATION/TRAINING PROGRAM

## 2022-02-09 PROCEDURE — 99213 PR OFFICE/OUTPT VISIT, EST, LEVL III, 20-29 MIN: ICD-10-PCS | Mod: S$PBB,,, | Performed by: STUDENT IN AN ORGANIZED HEALTH CARE EDUCATION/TRAINING PROGRAM

## 2022-02-09 PROCEDURE — 3078F DIAST BP <80 MM HG: CPT | Mod: CPTII,,, | Performed by: STUDENT IN AN ORGANIZED HEALTH CARE EDUCATION/TRAINING PROGRAM

## 2022-02-09 PROCEDURE — 3008F BODY MASS INDEX DOCD: CPT | Mod: CPTII,,, | Performed by: STUDENT IN AN ORGANIZED HEALTH CARE EDUCATION/TRAINING PROGRAM

## 2022-02-09 PROCEDURE — 99214 OFFICE O/P EST MOD 30 MIN: CPT | Mod: PBBFAC,PN | Performed by: STUDENT IN AN ORGANIZED HEALTH CARE EDUCATION/TRAINING PROGRAM

## 2022-02-09 PROCEDURE — 1159F PR MEDICATION LIST DOCUMENTED IN MEDICAL RECORD: ICD-10-PCS | Mod: CPTII,,, | Performed by: STUDENT IN AN ORGANIZED HEALTH CARE EDUCATION/TRAINING PROGRAM

## 2022-02-09 PROCEDURE — 99999 PR PBB SHADOW E&M-EST. PATIENT-LVL IV: CPT | Mod: PBBFAC,,, | Performed by: STUDENT IN AN ORGANIZED HEALTH CARE EDUCATION/TRAINING PROGRAM

## 2022-02-09 PROCEDURE — 3077F PR MOST RECENT SYSTOLIC BLOOD PRESSURE >= 140 MM HG: ICD-10-PCS | Mod: CPTII,,, | Performed by: STUDENT IN AN ORGANIZED HEALTH CARE EDUCATION/TRAINING PROGRAM

## 2022-02-09 PROCEDURE — 3077F SYST BP >= 140 MM HG: CPT | Mod: CPTII,,, | Performed by: STUDENT IN AN ORGANIZED HEALTH CARE EDUCATION/TRAINING PROGRAM

## 2022-02-09 PROCEDURE — 3078F PR MOST RECENT DIASTOLIC BLOOD PRESSURE < 80 MM HG: ICD-10-PCS | Mod: CPTII,,, | Performed by: STUDENT IN AN ORGANIZED HEALTH CARE EDUCATION/TRAINING PROGRAM

## 2022-02-09 PROCEDURE — 99999 PR PBB SHADOW E&M-EST. PATIENT-LVL IV: ICD-10-PCS | Mod: PBBFAC,,, | Performed by: STUDENT IN AN ORGANIZED HEALTH CARE EDUCATION/TRAINING PROGRAM

## 2022-02-09 PROCEDURE — 3008F PR BODY MASS INDEX (BMI) DOCUMENTED: ICD-10-PCS | Mod: CPTII,,, | Performed by: STUDENT IN AN ORGANIZED HEALTH CARE EDUCATION/TRAINING PROGRAM

## 2022-02-09 RX ORDER — SERTRALINE HYDROCHLORIDE 25 MG/1
25 TABLET, FILM COATED ORAL DAILY
Qty: 30 TABLET | Refills: 0 | Status: ON HOLD | OUTPATIENT
Start: 2022-02-09 | End: 2022-03-22 | Stop reason: HOSPADM

## 2022-02-09 NOTE — ASSESSMENT & PLAN NOTE
Counseled at length on smoking cessation. Will continue discussion in the future, while addressing all her other medical concerns at this time.

## 2022-02-09 NOTE — ASSESSMENT & PLAN NOTE
Since 2017 has been told she is anemic with low blood count requiring 3 weeks long at [facility] with 4U pRBCs at the time. Then this past April 2021 required 2U of platelets to undergo back surgery. Since then she has not had a follow up evaluation with hematologist. She is always tired and can sleep all day long and can function for couple hours at a time, which she has attributed to depression.

## 2022-02-09 NOTE — ASSESSMENT & PLAN NOTE
AST consistently elevated. Distant hepatitis C with no follow up evaluation.   - f/u referral to gastroenterology/hepatology

## 2022-02-09 NOTE — ASSESSMENT & PLAN NOTE
Progressive energy decline and unprovoked tearing up during the day time with chronic pain has limited her from wanting to get out of bed and out of the house. In the past, she tolerated Prozac, but due to undesirable side effects did not continue. Discussed other SSRIs, patient agrees to start Sertraline. Counseled at length on building current coping skills. Denies harm to self or others, SI/HI. Patient understands to take time out of the day to practice mindfulness at home, quiet moment in her room without any distraction.  - free relaxation exercises to read and listen at [email or web address]  - start 25 mg daily  - f/u two weeks

## 2022-02-09 NOTE — ASSESSMENT & PLAN NOTE
Cirrhosis with multiple portal venous collaterals including splenomegaly on last CT abdomen and pelvis 7/2021.

## 2022-02-09 NOTE — PROGRESS NOTES
"[facility] Note    Chief Complaint      Chief Complaint   Patient presents with    Follow-up     Left side hip pain continues to radiate down laterally, over that is tingling in sensation. Pain is relieved in forward flexion. She has not been able to stand more than 15 minutes long before losing strength in legs and feet. Currently being supported by boyfriend who assists with showering and using the bathroom to keep her from falling.          History of Present Illness      Rachel Zazueta is a 41 y.o. female who presents today for Follow-up (Left side hip pain continues to radiate down laterally, over that is tingling in sensation. Pain is relieved in forward flexion. She has not been able to stand more than 15 minutes long before losing strength in legs and feet. Currently being supported by boyfriend who assists with showering and using the bathroom to keep her from falling.   )   .  Past Medical History:  Past Medical History:   Diagnosis Date    Anemia     Hep C w/o coma, chronic     Liver cirrhosis      Past Surgical History:   has a past surgical history that includes Cholecystectomy; benine tumor removal; Back surgery; and Kidney surgery.    Family History:  family history includes Drug abuse in her mother; Hepatitis in her mother.     Social History:  Social History     Tobacco Use    Smoking status: Current Some Day Smoker     Packs/day: 0.25     Years: 23.00     Pack years: 5.75     Types: Cigarettes    Smokeless tobacco: Never Used   Substance Use Topics    Alcohol use: Not Currently    Drug use: Yes     Frequency: 4.0 times per week     Types: Methamphetamines, Marijuana     Comment: Paulette 1/month.  Meth- Injection, 1/w, injection in the right hand and wrist. Denies shared needles with other people, but occasional reuse at times. In addition endorses using a "fresh point" for sites of injection. Last use 3 days prior that was inhal     I personally reviewed all past " "medical, surgical, social and family history.    Review of Systems   Constitutional: Negative for chills, fever, malaise/fatigue and weight loss.   HENT: Negative for congestion, ear discharge, ear pain, sinus pain and sore throat.         Dry mouth "cotton mouth"   Eyes: Negative for blurred vision, pain, discharge and redness.   Respiratory: Negative for cough, hemoptysis, sputum production, shortness of breath, wheezing and stridor.    Cardiovascular: Negative for chest pain, palpitations and leg swelling.   Gastrointestinal: Negative for abdominal pain, blood in stool, constipation, diarrhea, nausea and vomiting.   Genitourinary: Negative for dysuria, frequency and hematuria.   Musculoskeletal: Positive for back pain (constant) and joint pain (knees bilaterally). Negative for falls, myalgias and neck pain.   Skin: Negative.  Negative for rash.   Neurological: Negative for dizziness, tingling, focal weakness, seizures, weakness and headaches.   Endo/Heme/Allergies: Negative for environmental allergies and polydipsia. Bruises/bleeds easily (with cuts, no recent ).   Psychiatric/Behavioral: Negative for depression and suicidal ideas. The patient is not nervous/anxious.       Medications:  Outpatient Encounter Medications as of 2/9/2022   Medication Sig Dispense Refill    diclofenac sodium (VOLTAREN) 1 % Gel Apply 2 g topically 4 (four) times daily. 50 g 0    polyethylene glycol (GLYCOLAX) 17 gram/dose powder Take 17 g by mouth once daily. 510 g 0    pregabalin (LYRICA) 50 MG capsule Take 1 capsule (50 mg total) by mouth 3 (three) times daily. 90 capsule 6     No facility-administered encounter medications on file as of 2/9/2022.     Allergies:  Review of patient's allergies indicates:   Allergen Reactions    Bee venom protein (honey bee) Anaphylaxis     Patient reports she is allergic to bee stings.    Wasp sting [allergen ext-venom-honey bee] Anaphylaxis    Adhesive Blisters    Strawberry Hives     Patient " "reports itching and hives    Iodine and iodide containing products Hives    Naproxen Other (See Comments)     Throat closing    Shellfish containing products     Nuts [tree nut] Rash     Health Maintenance:  Immunization History   Administered Date(s) Administered    COVID-19, MRNA, LN-S, PF (Pfizer) (Purple Cap) 04/30/2021    Influenza - Quadrivalent - PF *Preferred* (6 months and older) 10/05/2018, 01/26/2022    Pneumococcal Conjugate - 13 Valent 10/05/2018    Tdap 08/19/2018      Health Maintenance   Topic Date Due    Mammogram  Never done    TETANUS VACCINE  08/19/2028    Hepatitis C Screening  Completed    Lipid Panel  Completed      Physical Exam      Vital Signs  Temp: 98.3 °F (36.8 °C)  Temp src: Oral  Pulse: 99  SpO2: 96 %  BP: (!) 147/71  BP Location: Right arm  Patient Position: Sitting  Pain Score: 10-Worst pain ever  Pain Loc: Back (back and legs)  Height and Weight  Height: 5' 7" (170.2 cm)  Weight: 131.5 kg (290 lb 0.2 oz)  BSA (Calculated - sq m): 2.49 sq meters  BMI (Calculated): 45.4  Weight in (lb) to have BMI = 25: 159.3]    Physical Exam  Vitals reviewed.   Constitutional:       General: She is not in acute distress.     Appearance: Normal appearance. She is not ill-appearing or toxic-appearing.   HENT:      Head: Normocephalic and atraumatic.      Right Ear: External ear normal.      Left Ear: External ear normal.      Nose: Nose normal.      Mouth/Throat:      Mouth: Mucous membranes are dry.      Pharynx: Oropharynx is clear.   Eyes:      Extraocular Movements: Extraocular movements intact.      Conjunctiva/sclera: Conjunctivae normal.   Cardiovascular:      Rate and Rhythm: Normal rate and regular rhythm.      Pulses: Normal pulses.      Heart sounds: Normal heart sounds.   Pulmonary:      Effort: Pulmonary effort is normal. No respiratory distress.      Breath sounds: No stridor. No wheezing, rhonchi or rales.   Abdominal:      General: Abdomen is flat. Bowel sounds are normal. "      Palpations: Abdomen is soft.      Tenderness: There is no right CVA tenderness or left CVA tenderness.   Musculoskeletal:         General: Deformity present. No swelling or tenderness.      Cervical back: Normal range of motion and neck supple. No rigidity or tenderness.      Right lower leg: No edema.      Left lower leg: No edema.      Comments: Well healed midline scar over lumbar region, no erythema, no edema   Skin:     General: Skin is warm and dry.      Capillary Refill: Capillary refill takes less than 2 seconds.      Comments: Legs bilaterally with dry, flaky skin, chronic skin color changes.   Well healed midline scar over lumbar region, no erythema, no edema.      Neurological:      General: No focal deficit present.      Mental Status: She is alert and oriented to person, place, and time. Mental status is at baseline.      Motor: No weakness.      Gait: Gait abnormal (requires walking cane for support).   Psychiatric:         Mood and Affect: Mood normal.         Behavior: Behavior normal.         Thought Content: Thought content normal.         Judgment: Judgment normal.       Laboratory:  CBC:  Recent Labs   Lab 07/27/21  0549 07/27/21  0549 07/28/21  1207 07/28/21 1207 02/07/22  1647   WBC 2.34 L   < > 2.74 L   < > 2.22 L   RBC 4.38   < > 4.55   < > 3.38 L   Hemoglobin 12.2   < > 12.9   < > 9.3 L   Hematocrit 38.7   < > 40.1   < > 29.2 L   Platelets 65 L   < > 61 L   < > 90 L   MCV 88   < > 88   < > 86   MCH 27.9   < > 28.4   < > 27.5   MCHC 31.5 L  --  32.2  --  31.8 L    < > = values in this interval not displayed.     CMP:  Recent Labs   Lab 07/27/21  0549 07/27/21  0549 07/28/21  1207 07/28/21  1207 02/07/22  1647   Glucose 110   < > 106   < > 116 H   Calcium 8.6 L   < > 8.8   < > 8.0 L   Albumin 3.1 L   < > 3.4 L   < > 2.0 L   Total Protein 7.9   < > 8.5 H   < > 7.9   Sodium 145   < > 144   < > 140   Potassium 3.7   < > 3.3 L   < > 3.9   CO2 29   < > 27   < > 29   Chloride 111 H   < > 109    < > 108   BUN 9   < > 13   < > 9   Alkaline Phosphatase 138 H   < > 121   < > 128   ALT 37   < > 39   < > 22   AST 52 H   < > 47 H   < > 45 H   Total Bilirubin 1.2 H  --  2.3 H  --  1.2 H    < > = values in this interval not displayed.     LIPIDS:  Recent Labs   Lab 04/16/21  1738 02/07/22  1647   TSH 1.255  --    HDL  --  27 L   Cholesterol  --  68 L   Triglycerides  --  50   LDL Cholesterol  --  31.0 L   HDL/Cholesterol Ratio  --  39.7   Non-HDL Cholesterol  --  41   Total Cholesterol/HDL Ratio  --  2.5     Assessment/Plan     Rachel Zazueta is a 41 y.o.female with:    Problem List Items Addressed This Visit        Hematology    Thrombocytopenia       Oncology    Pancytopenia - Primary    Overview     Since 2017 has been told she is anemic with low blood count requiring 3 weeks long at [facility] with 4U pRBCs at the time. August 2018, patient had gone into the ED for a small cut that did not stop bleeding. Then this past April 2021 required 2U of platelets to undergo back surgery. Since then she has not had a follow up evaluation with hematologist. She is always tired and can sleep all day long and can function for couple hours at a time, which she has attributed to depression. Currently using a walker at home to prevent from falls. Last echocardiogram in April 2021, left ventricle is normal in size with normal systolic function. LVEF 60%.             Relevant Orders    Ambulatory referral/consult to Hematology / Oncology       Endocrine    BMI 45.0-49.9, adult       Other    Tobacco use disorder    Current Assessment & Plan     Counseled at length on smoking cessation. Will continue discussion in the future, while addressing all her other medical concerns at this time.              Other than changes above, cont current medications and maintain follow up with specialists.  No follow-ups on file.    Future Appointments   Date Time Provider Department Center   2/11/2022  1:00 PM [facility] [id number] [facility] AARONO [facility]  M   2/14/2022 12:45 PM [facility] OI-CT1 500 LB LIMIT [facility]   2/14/2022  2:30 PM Guille Templeton MD [facility]  [facility]

## 2022-02-09 NOTE — ASSESSMENT & PLAN NOTE
Contributing factors include liver cirrhosis and splenomegaly findings from last CT abdomen in July 2021. There appears to be delay with follow up with specialist due to ANGIE storm. Will refer patient to both hematology and gastroenterology.

## 2022-02-14 ENCOUNTER — HOSPITAL ENCOUNTER (INPATIENT)
Facility: HOSPITAL | Age: 42
LOS: 29 days | Discharge: NURSING, REHAB OR LONG-TERM CARE | DRG: 460 | End: 2022-03-15
Attending: EMERGENCY MEDICINE | Admitting: NEUROLOGICAL SURGERY
Payer: MEDICAID

## 2022-02-14 ENCOUNTER — HOSPITAL ENCOUNTER (OUTPATIENT)
Dept: RADIOLOGY | Facility: HOSPITAL | Age: 42
Discharge: HOME | DRG: 460 | End: 2022-02-14
Attending: NEUROLOGICAL SURGERY
Payer: MEDICAID

## 2022-02-14 ENCOUNTER — OFFICE VISIT (OUTPATIENT)
Dept: NEUROSURGERY | Facility: CLINIC | Age: 42
DRG: 460 | End: 2022-02-14
Payer: MEDICAID

## 2022-02-14 DIAGNOSIS — F15.20 AMPHETAMINE USE DISORDER, SEVERE: ICD-10-CM

## 2022-02-14 DIAGNOSIS — M43.27 FUSION OF SPINE, LUMBOSACRAL REGION: ICD-10-CM

## 2022-02-14 DIAGNOSIS — F12.20 CANNABIS USE DISORDER, MODERATE, DEPENDENCE: ICD-10-CM

## 2022-02-14 DIAGNOSIS — M46.46 DISCITIS OF LUMBAR REGION: Primary | ICD-10-CM

## 2022-02-14 DIAGNOSIS — Z01.818 PRE-OP EVALUATION: ICD-10-CM

## 2022-02-14 DIAGNOSIS — F33.0 MILD EPISODE OF RECURRENT MAJOR DEPRESSIVE DISORDER: Chronic | ICD-10-CM

## 2022-02-14 DIAGNOSIS — M46.40 SPONDYLODISCITIS: ICD-10-CM

## 2022-02-14 DIAGNOSIS — R00.0 TACHYCARDIA: ICD-10-CM

## 2022-02-14 DIAGNOSIS — F17.200 TOBACCO USE DISORDER: ICD-10-CM

## 2022-02-14 DIAGNOSIS — M48.061 SPINAL STENOSIS AT L4-L5 LEVEL: ICD-10-CM

## 2022-02-14 DIAGNOSIS — E80.6 HYPERBILIRUBINEMIA: ICD-10-CM

## 2022-02-14 DIAGNOSIS — R79.89 ABNORMAL LFTS: ICD-10-CM

## 2022-02-14 DIAGNOSIS — D61.818 PANCYTOPENIA: ICD-10-CM

## 2022-02-14 DIAGNOSIS — K74.60 CIRRHOSIS OF LIVER WITHOUT ASCITES, UNSPECIFIED HEPATIC CIRRHOSIS TYPE: ICD-10-CM

## 2022-02-14 DIAGNOSIS — T81.9XXA COMPLICATION OF PROCEDURE, INITIAL ENCOUNTER: ICD-10-CM

## 2022-02-14 LAB
ABO + RH BLD: NORMAL
ALBUMIN SERPL BCP-MCNC: 2.5 G/DL (ref 3.5–5.2)
ALP SERPL-CCNC: 130 U/L (ref 55–135)
ALT SERPL W/O P-5'-P-CCNC: 14 U/L (ref 10–44)
ANION GAP SERPL CALC-SCNC: 4 MMOL/L (ref 8–16)
APTT BLDCRRT: 29.9 SEC (ref 21–32)
AST SERPL-CCNC: 40 U/L (ref 10–40)
B-HCG UR QL: NEGATIVE
BASOPHILS # BLD AUTO: 0.01 K/UL (ref 0–0.2)
BASOPHILS NFR BLD: 0.2 % (ref 0–1.9)
BILIRUB SERPL-MCNC: 2.1 MG/DL (ref 0.1–1)
BLD GP AB SCN CELLS X3 SERPL QL: NORMAL
BUN SERPL-MCNC: 16 MG/DL (ref 6–20)
CALCIUM SERPL-MCNC: 8.5 MG/DL (ref 8.7–10.5)
CHLORIDE SERPL-SCNC: 103 MMOL/L (ref 95–110)
CK SERPL-CCNC: 64 U/L (ref 20–180)
CO2 SERPL-SCNC: 27 MMOL/L (ref 23–29)
CREAT SERPL-MCNC: 0.7 MG/DL (ref 0.5–1.4)
CRP SERPL-MCNC: 29.2 MG/L (ref 0–8.2)
CRP SERPL-MCNC: 30.8 MG/L (ref 0–8.2)
CTP QC/QA: YES
CTP QC/QA: YES
DIFFERENTIAL METHOD: ABNORMAL
EOSINOPHIL # BLD AUTO: 0.1 K/UL (ref 0–0.5)
EOSINOPHIL NFR BLD: 0.8 % (ref 0–8)
ERYTHROCYTE [DISTWIDTH] IN BLOOD BY AUTOMATED COUNT: 18.6 % (ref 11.5–14.5)
ERYTHROCYTE [SEDIMENTATION RATE] IN BLOOD BY WESTERGREN METHOD: 57 MM/HR (ref 0–36)
ERYTHROCYTE [SEDIMENTATION RATE] IN BLOOD BY WESTERGREN METHOD: 82 MM/HR (ref 0–36)
EST. GFR  (AFRICAN AMERICAN): >60 ML/MIN/1.73 M^2
EST. GFR  (NON AFRICAN AMERICAN): >60 ML/MIN/1.73 M^2
GLUCOSE SERPL-MCNC: 102 MG/DL (ref 70–110)
HCT VFR BLD AUTO: 32.4 % (ref 37–48.5)
HGB BLD-MCNC: 10 G/DL (ref 12–16)
IMM GRANULOCYTES # BLD AUTO: 0.02 K/UL (ref 0–0.04)
IMM GRANULOCYTES NFR BLD AUTO: 0.3 % (ref 0–0.5)
INR PPP: 1.3 (ref 0.8–1.2)
LACTATE SERPL-SCNC: 1.1 MMOL/L (ref 0.5–2.2)
LYMPHOCYTES # BLD AUTO: 0.4 K/UL (ref 1–4.8)
LYMPHOCYTES NFR BLD: 6.7 % (ref 18–48)
MCH RBC QN AUTO: 27.5 PG (ref 27–31)
MCHC RBC AUTO-ENTMCNC: 30.9 G/DL (ref 32–36)
MCV RBC AUTO: 89 FL (ref 82–98)
MONOCYTES # BLD AUTO: 0.3 K/UL (ref 0.3–1)
MONOCYTES NFR BLD: 5.2 % (ref 4–15)
NEUTROPHILS # BLD AUTO: 5.2 K/UL (ref 1.8–7.7)
NEUTROPHILS NFR BLD: 86.8 % (ref 38–73)
NRBC BLD-RTO: 0 /100 WBC
PLATELET # BLD AUTO: 59 K/UL (ref 150–450)
PMV BLD AUTO: 8.4 FL (ref 9.2–12.9)
POTASSIUM SERPL-SCNC: 4.3 MMOL/L (ref 3.5–5.1)
PROCALCITONIN SERPL IA-MCNC: 0.05 NG/ML
PROCALCITONIN SERPL IA-MCNC: 0.06 NG/ML
PROT SERPL-MCNC: 8.6 G/DL (ref 6–8.4)
PROTHROMBIN TIME: 13.4 SEC (ref 9–12.5)
RBC # BLD AUTO: 3.63 M/UL (ref 4–5.4)
SARS-COV-2 RDRP RESP QL NAA+PROBE: NEGATIVE
SODIUM SERPL-SCNC: 134 MMOL/L (ref 136–145)
WBC # BLD AUTO: 5.97 K/UL (ref 3.9–12.7)

## 2022-02-14 PROCEDURE — 84145 PROCALCITONIN (PCT): CPT | Performed by: PHYSICIAN ASSISTANT

## 2022-02-14 PROCEDURE — 84145 PROCALCITONIN (PCT): CPT | Mod: 91 | Performed by: STUDENT IN AN ORGANIZED HEALTH CARE EDUCATION/TRAINING PROGRAM

## 2022-02-14 PROCEDURE — 85025 COMPLETE CBC W/AUTO DIFF WBC: CPT | Performed by: EMERGENCY MEDICINE

## 2022-02-14 PROCEDURE — 72131 CT LUMBAR SPINE WITHOUT CONTRAST: ICD-10-PCS | Mod: 26,,, | Performed by: INTERNAL MEDICINE

## 2022-02-14 PROCEDURE — 72131 CT LUMBAR SPINE W/O DYE: CPT | Mod: 26,,, | Performed by: INTERNAL MEDICINE

## 2022-02-14 PROCEDURE — 99999 PR PBB SHADOW E&M-EST. PATIENT-LVL II: CPT | Mod: PBBFAC,,, | Performed by: NEUROLOGICAL SURGERY

## 2022-02-14 PROCEDURE — 99285 EMERGENCY DEPT VISIT HI MDM: CPT | Mod: CS,,, | Performed by: PHYSICIAN ASSISTANT

## 2022-02-14 PROCEDURE — 25500020 PHARM REV CODE 255: Performed by: NEUROLOGICAL SURGERY

## 2022-02-14 PROCEDURE — 63600175 PHARM REV CODE 636 W HCPCS: Performed by: STUDENT IN AN ORGANIZED HEALTH CARE EDUCATION/TRAINING PROGRAM

## 2022-02-14 PROCEDURE — 83605 ASSAY OF LACTIC ACID: CPT | Performed by: PHYSICIAN ASSISTANT

## 2022-02-14 PROCEDURE — 82550 ASSAY OF CK (CPK): CPT | Performed by: PHYSICIAN ASSISTANT

## 2022-02-14 PROCEDURE — 99214 OFFICE O/P EST MOD 30 MIN: CPT | Mod: S$PBB,,, | Performed by: NEUROLOGICAL SURGERY

## 2022-02-14 PROCEDURE — U0002 COVID-19 LAB TEST NON-CDC: HCPCS | Performed by: EMERGENCY MEDICINE

## 2022-02-14 PROCEDURE — 86140 C-REACTIVE PROTEIN: CPT | Mod: 91 | Performed by: STUDENT IN AN ORGANIZED HEALTH CARE EDUCATION/TRAINING PROGRAM

## 2022-02-14 PROCEDURE — 11000001 HC ACUTE MED/SURG PRIVATE ROOM

## 2022-02-14 PROCEDURE — 87389 HIV-1 AG W/HIV-1&-2 AB AG IA: CPT | Performed by: EMERGENCY MEDICINE

## 2022-02-14 PROCEDURE — 72131 CT LUMBAR SPINE W/O DYE: CPT | Mod: TC

## 2022-02-14 PROCEDURE — 85730 THROMBOPLASTIN TIME PARTIAL: CPT | Performed by: STUDENT IN AN ORGANIZED HEALTH CARE EDUCATION/TRAINING PROGRAM

## 2022-02-14 PROCEDURE — A9585 GADOBUTROL INJECTION: HCPCS | Performed by: NEUROLOGICAL SURGERY

## 2022-02-14 PROCEDURE — 99212 OFFICE O/P EST SF 10 MIN: CPT | Mod: PBBFAC,25 | Performed by: NEUROLOGICAL SURGERY

## 2022-02-14 PROCEDURE — 99285 EMERGENCY DEPT VISIT HI MDM: CPT | Mod: 25,27

## 2022-02-14 PROCEDURE — 99214 PR OFFICE/OUTPT VISIT, EST, LEVL IV, 30-39 MIN: ICD-10-PCS | Mod: S$PBB,,, | Performed by: NEUROLOGICAL SURGERY

## 2022-02-14 PROCEDURE — 86140 C-REACTIVE PROTEIN: CPT | Performed by: EMERGENCY MEDICINE

## 2022-02-14 PROCEDURE — 86901 BLOOD TYPING SEROLOGIC RH(D): CPT | Performed by: EMERGENCY MEDICINE

## 2022-02-14 PROCEDURE — 87040 BLOOD CULTURE FOR BACTERIA: CPT | Mod: 59 | Performed by: PHYSICIAN ASSISTANT

## 2022-02-14 PROCEDURE — 81025 URINE PREGNANCY TEST: CPT | Performed by: PHYSICIAN ASSISTANT

## 2022-02-14 PROCEDURE — 99285 PR EMERGENCY DEPT VISIT,LEVEL V: ICD-10-PCS | Mod: CS,,, | Performed by: PHYSICIAN ASSISTANT

## 2022-02-14 PROCEDURE — 85610 PROTHROMBIN TIME: CPT | Performed by: STUDENT IN AN ORGANIZED HEALTH CARE EDUCATION/TRAINING PROGRAM

## 2022-02-14 PROCEDURE — 85652 RBC SED RATE AUTOMATED: CPT | Mod: 91 | Performed by: STUDENT IN AN ORGANIZED HEALTH CARE EDUCATION/TRAINING PROGRAM

## 2022-02-14 PROCEDURE — 99999 PR PBB SHADOW E&M-EST. PATIENT-LVL II: ICD-10-PCS | Mod: PBBFAC,,, | Performed by: NEUROLOGICAL SURGERY

## 2022-02-14 PROCEDURE — 85652 RBC SED RATE AUTOMATED: CPT | Performed by: EMERGENCY MEDICINE

## 2022-02-14 PROCEDURE — 80053 COMPREHEN METABOLIC PANEL: CPT | Performed by: EMERGENCY MEDICINE

## 2022-02-14 PROCEDURE — 25000003 PHARM REV CODE 250: Performed by: STUDENT IN AN ORGANIZED HEALTH CARE EDUCATION/TRAINING PROGRAM

## 2022-02-14 RX ORDER — SERTRALINE HYDROCHLORIDE 25 MG/1
25 TABLET, FILM COATED ORAL DAILY
Status: DISCONTINUED | OUTPATIENT
Start: 2022-02-15 | End: 2022-02-17

## 2022-02-14 RX ORDER — POLYETHYLENE GLYCOL 3350 17 G/17G
17 POWDER, FOR SOLUTION ORAL DAILY
Status: DISCONTINUED | OUTPATIENT
Start: 2022-02-15 | End: 2022-02-27

## 2022-02-14 RX ORDER — IBUPROFEN 200 MG
16 TABLET ORAL
Status: DISCONTINUED | OUTPATIENT
Start: 2022-02-14 | End: 2022-02-22

## 2022-02-14 RX ORDER — OXYCODONE HYDROCHLORIDE 5 MG/1
5 TABLET ORAL EVERY 4 HOURS PRN
Status: DISCONTINUED | OUTPATIENT
Start: 2022-02-14 | End: 2022-02-21

## 2022-02-14 RX ORDER — GADOBUTROL 604.72 MG/ML
10 INJECTION INTRAVENOUS
Status: COMPLETED | OUTPATIENT
Start: 2022-02-14 | End: 2022-02-14

## 2022-02-14 RX ORDER — ACETAMINOPHEN 325 MG/1
650 TABLET ORAL EVERY 6 HOURS PRN
Status: DISCONTINUED | OUTPATIENT
Start: 2022-02-14 | End: 2022-02-23

## 2022-02-14 RX ORDER — TALC
6 POWDER (GRAM) TOPICAL NIGHTLY PRN
Status: DISCONTINUED | OUTPATIENT
Start: 2022-02-14 | End: 2022-03-15 | Stop reason: HOSPADM

## 2022-02-14 RX ORDER — PREGABALIN 50 MG/1
50 CAPSULE ORAL 3 TIMES DAILY
Status: DISCONTINUED | OUTPATIENT
Start: 2022-02-14 | End: 2022-03-02

## 2022-02-14 RX ORDER — IBUPROFEN 200 MG
16 TABLET ORAL
Status: DISCONTINUED | OUTPATIENT
Start: 2022-02-14 | End: 2022-03-15 | Stop reason: HOSPADM

## 2022-02-14 RX ORDER — HYDROMORPHONE HYDROCHLORIDE 1 MG/ML
1 INJECTION, SOLUTION INTRAMUSCULAR; INTRAVENOUS; SUBCUTANEOUS EVERY 6 HOURS PRN
Status: DISCONTINUED | OUTPATIENT
Start: 2022-02-14 | End: 2022-02-21

## 2022-02-14 RX ORDER — SODIUM CHLORIDE 9 MG/ML
INJECTION, SOLUTION INTRAVENOUS CONTINUOUS
Status: DISCONTINUED | OUTPATIENT
Start: 2022-02-15 | End: 2022-02-16

## 2022-02-14 RX ORDER — GLUCAGON 1 MG
1 KIT INJECTION
Status: DISCONTINUED | OUTPATIENT
Start: 2022-02-14 | End: 2022-03-15 | Stop reason: HOSPADM

## 2022-02-14 RX ORDER — IBUPROFEN 200 MG
24 TABLET ORAL
Status: DISCONTINUED | OUTPATIENT
Start: 2022-02-14 | End: 2022-03-15 | Stop reason: HOSPADM

## 2022-02-14 RX ADMIN — OXYCODONE 5 MG: 5 TABLET ORAL at 08:02

## 2022-02-14 RX ADMIN — GADOBUTROL 10 ML: 604.72 INJECTION INTRAVENOUS at 10:02

## 2022-02-14 RX ADMIN — HYDROMORPHONE HYDROCHLORIDE 1 MG: 1 INJECTION, SOLUTION INTRAMUSCULAR; INTRAVENOUS; SUBCUTANEOUS at 11:02

## 2022-02-14 RX ADMIN — PREGABALIN 50 MG: 50 CAPSULE ORAL at 08:02

## 2022-02-14 NOTE — ED PROVIDER NOTES
Encounter Date: 2/14/2022       History     Chief Complaint   Patient presents with    Post-op Problem     Back surg in 2020, infection of hardware with failure, sent here for admission     The patient is a 41 year old female hx of IVDA in remission that is s/p L3 and L4 laminectomy for evacuation of epidural abscess and L3-L5 TLIF on 04/16/2021. 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 Neurosurgeon requested admission to medicine and ID consult. Plan is to obtain full CNS MRI with contrast and for a needle biopsy with plan for surgical revision likely. The patient reports increased pain and weakness to her left leg. She reports increased low back pain.         Review of patient's allergies indicates:   Allergen Reactions    Bee venom protein (honey bee) Anaphylaxis     Patient reports she is allergic to bee stings.   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  Meth- Injection, 1/w, injection in the right hand and wrist. Denies shared needles with other people, but occasional reuse at times. In addition endorses using a "fresh point" for sites of injection. Last use 3 days prior that was inhal     Review of Systems   Constitutional: Negative for chills and fever.   HENT: Negative for congestion, rhinorrhea and sore throat.    Eyes: Negative for visual disturbance.   Respiratory: Negative for cough, chest tightness and shortness of breath.    Cardiovascular: Negative for chest pain and leg swelling.   Gastrointestinal: Negative for abdominal distention, abdominal pain, diarrhea, nausea and vomiting.   Genitourinary: Negative for difficulty urinating.   Musculoskeletal: Positive for back pain and gait problem.   Skin: Negative for rash.   Neurological: Positive for weakness and numbness. Negative for dizziness, seizures, syncope, facial asymmetry, speech difficulty, light-headedness and headaches.   Hematological: Negative for adenopathy.   Psychiatric/Behavioral: Negative for confusion.       Physical Exam     Initial Vitals [02/14/22 1548]   BP Pulse Resp Temp SpO2   133/80 104 18 98.3 °F (36.8 °C) 100 %      MAP       --         Physical Exam    Nursing note and vitals reviewed.  Constitutional: She appears well-developed and well-nourished. She appears distressed.   HENT:   Head: Normocephalic.   Eyes: Conjunctivae are normal.   Neck: Neck supple.   Normal range of motion.  Cardiovascular:   Tachycardia    Pulmonary/Chest: No respiratory distress.   Abdominal: Abdomen is soft. She exhibits no distension.   Musculoskeletal:         General: Normal range of motion.      Cervical back: Normal range of motion and neck supple.      Comments: Lumbar tenderness to palpation over surgical scar      Neurological: She is alert and oriented to person, place, and time.   Difficulty walking antalgic gait    Skin: Skin is " warm and dry.   Psychiatric: She has a normal mood and affect. Her behavior is normal.         ED Course   Procedures  Labs Reviewed   CBC W/ AUTO DIFFERENTIAL - Abnormal; Notable for the following components:       Result Value    RBC 3.63 (*)     Hemoglobin 10.0 (*)     Hematocrit 32.4 (*)     MCHC 30.9 (*)     RDW 18.6 (*)     Platelets 59 (*)     MPV 8.4 (*)     Lymph # 0.4 (*)     Gran % 86.8 (*)     Lymph % 6.7 (*)     All other components within normal limits   COMPREHENSIVE METABOLIC PANEL - Abnormal; Notable for the following components:    Sodium 134 (*)     Calcium 8.5 (*)     Total Protein 8.6 (*)     Albumin 2.5 (*)     Total Bilirubin 2.1 (*)     Anion Gap 4 (*)     All other components within normal limits   SEDIMENTATION RATE - Abnormal; Notable for the following components:    Sed Rate 82 (*)     All other components within normal limits   C-REACTIVE PROTEIN - Abnormal; Notable for the following components:    CRP 30.8 (*)     All other components within normal limits   CULTURE, BLOOD   CULTURE, BLOOD   CULTURE, BLOOD   CULTURE, BLOOD   PROCALCITONIN   LACTIC ACID, PLASMA   CK   HIV 1 / 2 ANTIBODY   URINALYSIS, REFLEX TO URINE CULTURE   DRUG SCREEN PANEL, URINE EMERGENCY   SEDIMENTATION RATE   C-REACTIVE PROTEIN   PROCALCITONIN   APTT   PROTIME-INR   SARS-COV-2 RDRP GENE    Narrative:     This test utilizes isothermal nucleic acid amplification   technology to detect the SARS-CoV-2 RdRp nucleic acid segment.   The analytical sensitivity (limit of detection) is 125 genome   equivalents/mL.   A POSITIVE result implies infection with the SARS-CoV-2 virus;   the patient is presumed to be contagious.     A NEGATIVE result means that SARS-CoV-2 nucleic acids are not   present above the limit of detection. A NEGATIVE result should be   treated as presumptive. It does not rule out the possibility of   COVID-19 and should not be the sole basis for treatment decisions.   If COVID-19 is strongly suspected  based on clinical and exposure   history, re-testing using an alternate molecular assay should be   considered.   This test is only for use under the Food and Drug   Administration s Emergency Use Authorization (EUA).   Commercial kits are provided by [employer].   Performance characteristics of the EUA have been independently   verified by [facility] Department of   Pathology and Laboratory Medicine.   _________________________________________________________________   The authorized Fact Sheet for Healthcare Providers and the authorized Fact   Sheet for Patients of the ID NOW COVID-19 are available on the FDA   website:     https://www.fda.gov/media/111955/download  https://www.fda.gov/media/456813/download         SARS-COV-2 RDRP GENE   POCT URINE PREGNANCY   TYPE & SCREEN     Results for orders placed or performed during the hospital encounter of 02/14/22   CBC auto differential   Result Value Ref Range    WBC 5.97 3.90 - 12.70 K/uL    RBC 3.63 (L) 4.00 - 5.40 M/uL    Hemoglobin 10.0 (L) 12.0 - 16.0 g/dL    Hematocrit 32.4 (L) 37.0 - 48.5 %    MCV 89 82 - 98 fL    MCH 27.5 27.0 - 31.0 pg    MCHC 30.9 (L) 32.0 - 36.0 g/dL    RDW 18.6 (H) 11.5 - 14.5 %    Platelets 59 (L) 150 - 450 K/uL    MPV 8.4 (L) 9.2 - 12.9 fL    Immature Granulocytes 0.3 0.0 - 0.5 %    Gran # (ANC) 5.2 1.8 - 7.7 K/uL    Immature Grans (Abs) 0.02 0.00 - 0.04 K/uL    Lymph # 0.4 (L) 1.0 - 4.8 K/uL    Mono # 0.3 0.3 - 1.0 K/uL    Eos # 0.1 0.0 - 0.5 K/uL    Baso # 0.01 0.00 - 0.20 K/uL    nRBC 0 0 /100 WBC    Gran % 86.8 (H) 38.0 - 73.0 %    Lymph % 6.7 (L) 18.0 - 48.0 %    Mono % 5.2 4.0 - 15.0 %    Eosinophil % 0.8 0.0 - 8.0 %    Basophil % 0.2 0.0 - 1.9 %    Differential Method Automated    Comprehensive metabolic panel   Result Value Ref Range    Sodium 134 (L) 136 - 145 mmol/L    Potassium 4.3 3.5 - 5.1 mmol/L    Chloride 103 95 - 110 mmol/L    CO2 27 23 - 29 mmol/L    Glucose 102 70 - 110 mg/dL    BUN 16 6 - 20 mg/dL     Creatinine 0.7 0.5 - 1.4 mg/dL    Calcium 8.5 (L) 8.7 - 10.5 mg/dL    Total Protein 8.6 (H) 6.0 - 8.4 g/dL    Albumin 2.5 (L) 3.5 - 5.2 g/dL    Total Bilirubin 2.1 (H) 0.1 - 1.0 mg/dL    Alkaline Phosphatase 130 55 - 135 U/L    AST 40 10 - 40 U/L    ALT 14 10 - 44 U/L    Anion Gap 4 (L) 8 - 16 mmol/L    eGFR if African American >60.0 >60 mL/min/1.73 m^2    eGFR if non African American >60.0 >60 mL/min/1.73 m^2   Sedimentation rate   Result Value Ref Range    Sed Rate 82 (H) 0 - 36 mm/Hr   C-reactive protein   Result Value Ref Range    CRP 30.8 (H) 0.0 - 8.2 mg/L   Procalcitonin   Result Value Ref Range    Procalcitonin 0.06 <0.25 ng/mL   Lactic acid, plasma   Result Value Ref Range    Lactate (Lactic Acid) 1.1 0.5 - 2.2 mmol/L   CPK   Result Value Ref Range    CPK 64 20 - 180 U/L   POCT COVID-19 Rapid Screening   Result Value Ref Range    POC Rapid COVID Negative Negative     Acceptable Yes    Type & Screen   Result Value Ref Range    Group & Rh A POS     Indirect Natalie NEG             Imaging Results    None          Medications   glucose chewable tablet 16 g (has no administration in time range)   glucose chewable tablet 16 g (has no administration in time range)   glucose chewable tablet 24 g (has no administration in time range)   glucagon (human recombinant) injection 1 mg (has no administration in time range)   0.9%  NaCl infusion (has no administration in time range)   melatonin tablet 6 mg (has no administration in time range)   acetaminophen tablet 650 mg (has no administration in time range)   oxyCODONE immediate release tablet 5 mg (has no administration in time range)   HYDROmorphone injection 1 mg (has no administration in time range)   pregabalin capsule 50 mg (has no administration in time range)   sertraline tablet 25 mg (has no administration in time range)   polyethylene glycol packet 17 g (has no administration in time range)   dextrose 10% bolus 125 mL (has no administration in  time range)   dextrose 10% bolus 250 mL (has no administration in time range)     Medical Decision Making:   History:   Old Medical Records: I decided to obtain old medical records.  Initial Assessment:   40 yo female sent to the ER by neurosurgery for admission due to concern for postop infection and persistent diskitis   Differential Diagnosis:   Postop infection, abscess, diskitis, etc   Clinical Tests:   Lab Tests: Ordered and Reviewed  Radiological Study: Ordered  ED Management:  Vital signs reviewed - benign   Records reviewed   I discussed the case with the ER attending physician   I discussed the case with on call neurosurgery - states that he is aware of the patient and will be admitting                            Clinical Impression:   Final diagnoses:  [M46.46] Discitis of lumbar region (Primary)  [T81.9XXA] Complication of procedure, initial encounter          ED Disposition Condition    Admit               Jhonatan Phelps PA-C  02/14/22 1949

## 2022-02-14 NOTE — PROGRESS NOTES
CHIEF COMPLAINT:  Follow up with new imaging    I, Louie Bradford, attest that this documentation has been prepared under the direction and in the presence of Guille Templeton MD.    HPI:  Rachel Zazueta is a 41 y.o.  female with PMHx of Hepatitis C, liver cirrhosis, anemia, and polysubstance abuse, who presents to clinic for follow up with new imaging s/p L3 and L4 laminectomy for evacuation of epidural abscess and L3-L5 TLIF on 04/16/2021.    The pt c/o worsening back and left leg pain since October. She states that she is no longer using IV drugs. States only using marijuana. Describes the left leg pain as a shock down the leg when standing for 10 minutes. Denies taking any antibiotics. Endorses new weakness in the legs. Denies b/b dysfunction. Denies new neck pain. She ambulates with a walker at home. States that she smoke.    Review of patient's allergies indicates:   Allergen Reactions    Bee venom protein (honey bee) Anaphylaxis     Patient reports she is allergic to bee stings.   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  Meth- Injection, 1/w, injection in the right hand and wrist. Denies shared needles with other people, but occasional reuse at times. In addition endorses using a "fresh point" for sites of injection. Last use 3 days prior that was inhal        Review of Systems   Constitutional: Negative.    HENT: Negative.    Eyes: Negative.    Respiratory: Negative.    Cardiovascular: Negative.    Gastrointestinal: Negative.    Endocrine: Negative.    Genitourinary: Negative.    Musculoskeletal: Positive for back pain and myalgias. Negative for gait problem and neck pain.   Skin: Negative.    Allergic/Immunologic: Negative.    Neurological: Positive for weakness. Negative for light-headedness, numbness and headaches.   Hematological: Negative.    Psychiatric/Behavioral: Negative.        OBJECTIVE:   Vital Signs:       Physical Exam:    Vital signs: All nursing notes and vital signs reviewed -- afebrile, vital signs stable.  Constitutional: Patient sitting comfortably in chair. Appears well developed and well nourished.  Skin: Exposed areas are intact without abnormal markings, rashes or other lesions.  HEENT: Normocephalic. Normal conjunctivae.  Cardiovascular: Normal rate and regular rhythm.  Respiratory: Chest wall rises and falls symmetrically, without signs of respiratory distress.  Abdomen: Soft and non-tender.  Extremities: Warm and without edema. Calves supple, non-tender.  Psych/Behavior: Normal affect.    Neurological:    Mental status: Alert and oriented. Conversational and appropriate.       Cranial Nerves: VFF to confrontation. PERRL. EOMI without nystagmus. Facial STLT normal and symmetric. Strong, symmetric muscles of mastication. Facial strength full and symmetric. Hearing equal bilaterally to finger rub. Palate and uvula rise and fall normally in midline. Shoulder shrug 5/5 strength. Tongue midline.     Motor:    Upper:  Deltoids " Triceps Biceps WE WF     R 5/5 5/5 5/5 5/5 5/5 5/5    L 5/5 5/5 5/5 5/5 5/5 5/5      Lower:  HF KE KF DF PF EHL    R 5/5 5/5 5/5 5/5 5/5 5/5    L 5/5 5/5 5/5 5/5 5/5 5/5     Sensory: Intact sensation to light touch in all extremities. Romberg negative.    Well healed lumbar incision  Severe TTP  Pain limited 4 throughout both legs.    Reflexes:          DTR: 2+ symmetrically throughout.     Parker's: Negative.     Babinski's: Negative.     Clonus: Negative.    Cerebellar: Finger-to-nose and rapid alternating movements normal. Gait stable, fluid.    Spine:    Posture: Head well aligned over pelvis in front and side views.  No focal or global spinal deformity visible on inspection. Shoulders and hips even. No obvious leg length discrepancy. No scapula winging.    Bending: Full ROM with forward, back and lateral bending. No rib prominence with forward bend.    Cervical:      ROM: Full with flexion, extension, lateral rotation and ear-to-shoulder bend.      Midline TTP: Negative.     Spurling's test: Negative.     Lhermitte's: Negative.    Thoracic:     Midline TTP: Negative    Lumbar:     Midline TTP: Negative     Straight Leg Test: Negative     Crossed Straight Leg Test: Negative     Sciatic notch tenderness: Negative.    Other:     SI joint TTP: Negative.     Greater trochanter TTP: Negative.     Tenderness with external/internal hip rotation: Negative.    Diagnostic Results:  All imaging was independently reviewed by me.    CT L-spine, dated 02/14/2022:  1. CT lumbar is unviewable due to EMR failure  2. Report says failure of hardware and concern for spinal infection    AP and Lateral X-ray L-spine, dated 12/21/2021:  1. Posterior hardware from L3 to L5  2. Lumbar scoliosis  3. Scoliosis is new compared to post op films    ASSESSMENT/PLAN:     Rachel Zazueta has been lost to follow up since surgery. Today she presents with persistent and progressive lumbar spondylodiscitis with resultant hardware failure  and new scoliosis. She claims to have quit IV drug abuse but continues to abuse nicotine and marijuana. She has severe back and left leg pain when standing/walking. On exam she has pain limited weakness of both legs and exquisite midline tenderness over her well healed lumbar scar. I recommend direct hospital admission with consultation of ID and hospital medicine. She will need full CNS MRI with contrast and a needle biopsy of infection nidus. Surgical plans to follow studies.    The patient understands and agrees with the plan of care. All questions were answered.     1. Direct admit  2. MRI B, C, T and L spine +/- steven  3. Consult ID and hospital med      I, Dr. Guille Templeton personally performed the services described in this documentation. All medical record entries made by the scribe, Louie Bradford, were at my direction and in my presence.  I have reviewed the chart and agree that the record reflects my personal performance and is accurate and complete.      Guille Templeton M.D.  Department of Neurosurgery  [facility]

## 2022-02-15 PROBLEM — E80.6 HYPERBILIRUBINEMIA: Status: ACTIVE | Noted: 2022-02-15

## 2022-02-15 PROBLEM — D68.9 COAGULOPATHY: Status: ACTIVE | Noted: 2022-02-15

## 2022-02-15 LAB
ANION GAP SERPL CALC-SCNC: 5 MMOL/L (ref 8–16)
AV INDEX (PROSTH): 0.91
AV MEAN GRADIENT: 6 MMHG
AV PEAK GRADIENT: 9 MMHG
AV VALVE AREA: 3.6 CM2
AV VELOCITY RATIO: 0.92
BASOPHILS # BLD AUTO: 0.02 K/UL (ref 0–0.2)
BASOPHILS NFR BLD: 0.4 % (ref 0–1.9)
BSA FOR ECHO PROCEDURE: 2.49 M2
BUN SERPL-MCNC: 12 MG/DL (ref 6–20)
CALCIUM SERPL-MCNC: 8 MG/DL (ref 8.7–10.5)
CHLORIDE SERPL-SCNC: 102 MMOL/L (ref 95–110)
CO2 SERPL-SCNC: 25 MMOL/L (ref 23–29)
CREAT SERPL-MCNC: 0.5 MG/DL (ref 0.5–1.4)
CV ECHO LV RWT: 0.27 CM
DIFFERENTIAL METHOD: ABNORMAL
DOP CALC AO PEAK VEL: 1.48 M/S
DOP CALC AO VTI: 28.26 CM
DOP CALC LVOT AREA: 3.9 CM2
DOP CALC LVOT DIAMETER: 2.24 CM
DOP CALC LVOT PEAK VEL: 1.36 M/S
DOP CALC LVOT STROKE VOLUME: 101.78 CM3
DOP CALCLVOT PEAK VEL VTI: 25.84 CM
E WAVE DECELERATION TIME: 159.03 MSEC
E/A RATIO: 1.01
E/E' RATIO: 8.67 M/S
ECHO LV POSTERIOR WALL: 0.77 CM (ref 0.6–1.1)
EJECTION FRACTION: 60 %
EOSINOPHIL # BLD AUTO: 0 K/UL (ref 0–0.5)
EOSINOPHIL NFR BLD: 0.8 % (ref 0–8)
ERYTHROCYTE [DISTWIDTH] IN BLOOD BY AUTOMATED COUNT: 18.3 % (ref 11.5–14.5)
EST. GFR  (AFRICAN AMERICAN): >60 ML/MIN/1.73 M^2
EST. GFR  (NON AFRICAN AMERICAN): >60 ML/MIN/1.73 M^2
FRACTIONAL SHORTENING: 36 % (ref 28–44)
GLUCOSE SERPL-MCNC: 75 MG/DL (ref 70–110)
GRAM STN SPEC: NORMAL
GRAM STN SPEC: NORMAL
HCT VFR BLD AUTO: 31.6 % (ref 37–48.5)
HGB BLD-MCNC: 9.8 G/DL (ref 12–16)
IMM GRANULOCYTES # BLD AUTO: 0.01 K/UL (ref 0–0.04)
IMM GRANULOCYTES NFR BLD AUTO: 0.2 % (ref 0–0.5)
INTERVENTRICULAR SEPTUM: 0.71 CM (ref 0.6–1.1)
LA MAJOR: 5.59 CM
LA MINOR: 5.61 CM
LA WIDTH: 4.73 CM
LEFT ATRIUM SIZE: 4.65 CM
LEFT ATRIUM VOLUME INDEX MOD: 39.1 ML/M2
LEFT ATRIUM VOLUME INDEX: 44.2 ML/M2
LEFT ATRIUM VOLUME MOD: 92.69 CM3
LEFT ATRIUM VOLUME: 104.69 CM3
LEFT INTERNAL DIMENSION IN SYSTOLE: 3.63 CM (ref 2.1–4)
LEFT VENTRICLE DIASTOLIC VOLUME INDEX: 66.39 ML/M2
LEFT VENTRICLE DIASTOLIC VOLUME: 157.34 ML
LEFT VENTRICLE MASS INDEX: 64 G/M2
LEFT VENTRICLE SYSTOLIC VOLUME INDEX: 23.5 ML/M2
LEFT VENTRICLE SYSTOLIC VOLUME: 55.6 ML
LEFT VENTRICULAR INTERNAL DIMENSION IN DIASTOLE: 5.66 CM (ref 3.5–6)
LEFT VENTRICULAR MASS: 152.58 G
LV LATERAL E/E' RATIO: 7.43 M/S
LV SEPTAL E/E' RATIO: 10.4 M/S
LYMPHOCYTES # BLD AUTO: 0.4 K/UL (ref 1–4.8)
LYMPHOCYTES NFR BLD: 8.6 % (ref 18–48)
MCH RBC QN AUTO: 28 PG (ref 27–31)
MCHC RBC AUTO-ENTMCNC: 31 G/DL (ref 32–36)
MCV RBC AUTO: 90 FL (ref 82–98)
MONOCYTES # BLD AUTO: 0.4 K/UL (ref 0.3–1)
MONOCYTES NFR BLD: 7.7 % (ref 4–15)
MV A" WAVE DURATION": 9.42 MSEC
MV PEAK A VEL: 1.03 M/S
MV PEAK E VEL: 1.04 M/S
MV STENOSIS PRESSURE HALF TIME: 46.12 MS
MV VALVE AREA P 1/2 METHOD: 4.77 CM2
NEUTROPHILS # BLD AUTO: 4 K/UL (ref 1.8–7.7)
NEUTROPHILS NFR BLD: 82.3 % (ref 38–73)
NRBC BLD-RTO: 0 /100 WBC
PISA TR MAX VEL: 2.52 M/S
PLATELET # BLD AUTO: 57 K/UL (ref 150–450)
PMV BLD AUTO: 9.2 FL (ref 9.2–12.9)
POTASSIUM SERPL-SCNC: 4.1 MMOL/L (ref 3.5–5.1)
PULM VEIN S/D RATIO: 1.44
PV PEAK D VEL: 0.63 M/S
PV PEAK S VEL: 0.91 M/S
RA MAJOR: 5.3 CM
RA PRESSURE: 3 MMHG
RA WIDTH: 3.01 CM
RBC # BLD AUTO: 3.5 M/UL (ref 4–5.4)
RIGHT VENTRICULAR END-DIASTOLIC DIMENSION: 2.82 CM
RV TISSUE DOPPLER FREE WALL SYSTOLIC VELOCITY 1 (APICAL 4 CHAMBER VIEW): 18.16 CM/S
SINUS: 3.12 CM
SODIUM SERPL-SCNC: 132 MMOL/L (ref 136–145)
TDI LATERAL: 0.14 M/S
TDI SEPTAL: 0.1 M/S
TDI: 0.12 M/S
TR MAX PG: 25 MMHG
TRICUSPID ANNULAR PLANE SYSTOLIC EXCURSION: 3.47 CM
TV REST PULMONARY ARTERY PRESSURE: 28 MMHG
WBC # BLD AUTO: 4.91 K/UL (ref 3.9–12.7)

## 2022-02-15 PROCEDURE — 11000001 HC ACUTE MED/SURG PRIVATE ROOM

## 2022-02-15 PROCEDURE — 88307 TISSUE EXAM BY PATHOLOGIST: CPT | Performed by: PATHOLOGY

## 2022-02-15 PROCEDURE — 80048 BASIC METABOLIC PNL TOTAL CA: CPT | Performed by: STUDENT IN AN ORGANIZED HEALTH CARE EDUCATION/TRAINING PROGRAM

## 2022-02-15 PROCEDURE — 93010 EKG 12-LEAD: ICD-10-PCS | Mod: ,,, | Performed by: INTERNAL MEDICINE

## 2022-02-15 PROCEDURE — 25000003 PHARM REV CODE 250: Performed by: STUDENT IN AN ORGANIZED HEALTH CARE EDUCATION/TRAINING PROGRAM

## 2022-02-15 PROCEDURE — 99232 SBSQ HOSP IP/OBS MODERATE 35: CPT | Mod: ,,, | Performed by: PHYSICIAN ASSISTANT

## 2022-02-15 PROCEDURE — 93010 ELECTROCARDIOGRAM REPORT: CPT | Mod: ,,, | Performed by: INTERNAL MEDICINE

## 2022-02-15 PROCEDURE — 87206 SMEAR FLUORESCENT/ACID STAI: CPT | Performed by: NEUROLOGICAL SURGERY

## 2022-02-15 PROCEDURE — 87205 SMEAR GRAM STAIN: CPT | Performed by: NEUROLOGICAL SURGERY

## 2022-02-15 PROCEDURE — 88311 DECALCIFY TISSUE: CPT | Performed by: PATHOLOGY

## 2022-02-15 PROCEDURE — 88307 TISSUE EXAM BY PATHOLOGIST: CPT | Mod: 26,,, | Performed by: PATHOLOGY

## 2022-02-15 PROCEDURE — 25000003 PHARM REV CODE 250: Performed by: NEUROLOGICAL SURGERY

## 2022-02-15 PROCEDURE — 99223 PR INITIAL HOSPITAL CARE,LEVL III: ICD-10-PCS | Mod: ,,, | Performed by: PHYSICIAN ASSISTANT

## 2022-02-15 PROCEDURE — 85025 COMPLETE CBC W/AUTO DIFF WBC: CPT | Performed by: STUDENT IN AN ORGANIZED HEALTH CARE EDUCATION/TRAINING PROGRAM

## 2022-02-15 PROCEDURE — 88307 PR  SURG PATH,LEVEL V: ICD-10-PCS | Mod: 26,,, | Performed by: PATHOLOGY

## 2022-02-15 PROCEDURE — 99232 PR SUBSEQUENT HOSPITAL CARE,LEVL II: ICD-10-PCS | Mod: ,,, | Performed by: PHYSICIAN ASSISTANT

## 2022-02-15 PROCEDURE — 63600175 PHARM REV CODE 636 W HCPCS: Performed by: NEUROLOGICAL SURGERY

## 2022-02-15 PROCEDURE — 87102 FUNGUS ISOLATION CULTURE: CPT | Performed by: NEUROLOGICAL SURGERY

## 2022-02-15 PROCEDURE — 63600175 PHARM REV CODE 636 W HCPCS: Performed by: RADIOLOGY

## 2022-02-15 PROCEDURE — 99223 1ST HOSP IP/OBS HIGH 75: CPT | Mod: ,,, | Performed by: PHYSICIAN ASSISTANT

## 2022-02-15 PROCEDURE — 87116 MYCOBACTERIA CULTURE: CPT | Performed by: NEUROLOGICAL SURGERY

## 2022-02-15 PROCEDURE — 93005 ELECTROCARDIOGRAM TRACING: CPT

## 2022-02-15 PROCEDURE — 99223 1ST HOSP IP/OBS HIGH 75: CPT | Mod: ,,, | Performed by: HOSPITALIST

## 2022-02-15 PROCEDURE — 36415 COLL VENOUS BLD VENIPUNCTURE: CPT | Performed by: STUDENT IN AN ORGANIZED HEALTH CARE EDUCATION/TRAINING PROGRAM

## 2022-02-15 PROCEDURE — 63600175 PHARM REV CODE 636 W HCPCS: Performed by: STUDENT IN AN ORGANIZED HEALTH CARE EDUCATION/TRAINING PROGRAM

## 2022-02-15 PROCEDURE — 63600175 PHARM REV CODE 636 W HCPCS: Performed by: PHYSICIAN ASSISTANT

## 2022-02-15 PROCEDURE — 87070 CULTURE OTHR SPECIMN AEROBIC: CPT | Performed by: NEUROLOGICAL SURGERY

## 2022-02-15 PROCEDURE — 87075 CULTR BACTERIA EXCEPT BLOOD: CPT | Performed by: NEUROLOGICAL SURGERY

## 2022-02-15 PROCEDURE — 99223 PR INITIAL HOSPITAL CARE,LEVL III: ICD-10-PCS | Mod: ,,, | Performed by: HOSPITALIST

## 2022-02-15 RX ORDER — MIDAZOLAM HYDROCHLORIDE 1 MG/ML
INJECTION INTRAMUSCULAR; INTRAVENOUS CODE/TRAUMA/SEDATION MEDICATION
Status: COMPLETED | OUTPATIENT
Start: 2022-02-15 | End: 2022-02-15

## 2022-02-15 RX ORDER — ACETAMINOPHEN 325 MG/1
650 TABLET ORAL EVERY 4 HOURS PRN
Status: CANCELLED | OUTPATIENT
Start: 2022-02-15

## 2022-02-15 RX ORDER — OXYCODONE HYDROCHLORIDE 5 MG/1
5 TABLET ORAL EVERY 4 HOURS PRN
Status: CANCELLED | OUTPATIENT
Start: 2022-02-15

## 2022-02-15 RX ORDER — ONDANSETRON 2 MG/ML
4 INJECTION INTRAMUSCULAR; INTRAVENOUS EVERY 6 HOURS PRN
Status: CANCELLED | OUTPATIENT
Start: 2022-02-15

## 2022-02-15 RX ORDER — FENTANYL CITRATE 50 UG/ML
INJECTION, SOLUTION INTRAMUSCULAR; INTRAVENOUS CODE/TRAUMA/SEDATION MEDICATION
Status: COMPLETED | OUTPATIENT
Start: 2022-02-15 | End: 2022-02-15

## 2022-02-15 RX ADMIN — OXYCODONE 5 MG: 5 TABLET ORAL at 03:02

## 2022-02-15 RX ADMIN — HYDROMORPHONE HYDROCHLORIDE 1 MG: 1 INJECTION, SOLUTION INTRAMUSCULAR; INTRAVENOUS; SUBCUTANEOUS at 05:02

## 2022-02-15 RX ADMIN — SODIUM CHLORIDE: 0.9 INJECTION, SOLUTION INTRAVENOUS at 05:02

## 2022-02-15 RX ADMIN — POLYETHYLENE GLYCOL 3350 17 G: 17 POWDER, FOR SOLUTION ORAL at 09:02

## 2022-02-15 RX ADMIN — FENTANYL CITRATE 50 MCG: 50 INJECTION, SOLUTION INTRAMUSCULAR; INTRAVENOUS at 11:02

## 2022-02-15 RX ADMIN — MIDAZOLAM HYDROCHLORIDE 1 MG: 1 INJECTION INTRAMUSCULAR; INTRAVENOUS at 11:02

## 2022-02-15 RX ADMIN — PREGABALIN 50 MG: 50 CAPSULE ORAL at 09:02

## 2022-02-15 RX ADMIN — OXYCODONE 5 MG: 5 TABLET ORAL at 09:02

## 2022-02-15 RX ADMIN — CEFTRIAXONE SODIUM 2 G: 2 INJECTION, SOLUTION INTRAVENOUS at 02:02

## 2022-02-15 RX ADMIN — HYDROMORPHONE HYDROCHLORIDE 1 MG: 1 INJECTION, SOLUTION INTRAMUSCULAR; INTRAVENOUS; SUBCUTANEOUS at 02:02

## 2022-02-15 RX ADMIN — VANCOMYCIN HYDROCHLORIDE 2000 MG: 10 INJECTION, POWDER, LYOPHILIZED, FOR SOLUTION INTRAVENOUS at 03:02

## 2022-02-15 RX ADMIN — PREGABALIN 50 MG: 50 CAPSULE ORAL at 02:02

## 2022-02-15 RX ADMIN — SERTRALINE HYDROCHLORIDE 25 MG: 25 TABLET ORAL at 09:02

## 2022-02-15 NOTE — SUBJECTIVE & OBJECTIVE
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    Wasp sting [allergen ext-venom-honey bee] Anaphylaxis    Adhesive Blisters    Strawberry Hives     Patient reports itching and hives    Iodine and iodide containing products Hives    Naproxen Other (See Comments)     Throat closing    Shellfish containing products     Nuts [tree nut] Rash       Medications:  Medications Prior to Admission   Medication Sig    diclofenac sodium (VOLTAREN) 1 % Gel Apply 2 g topically 4 (four) times daily.    polyethylene glycol (GLYCOLAX) 17 gram/dose powder Take 17 g by mouth once daily.    pregabalin (LYRICA) 50 MG capsule Take 1 capsule (50 mg total) by mouth 3 (three) times daily.    sertraline (ZOLOFT) 25 MG tablet Take 1 tablet (25 mg total) by mouth once daily.     Antibiotics (From admission, onward)            None        Antifungals (From admission, onward)            None        Antivirals (From admission, onward)    None           Immunization History   Administered Date(s) Administered    COVID-19, MRNA, LN-S, PF (Pfizer) (Purple Cap) 04/30/2021    Influenza - Quadrivalent - PF *Preferred* (6 months and older) 10/05/2018, 01/26/2022    Pneumococcal Conjugate - 13 Valent 10/05/2018    Tdap 08/19/2018       Family History     Problem Relation (Age of Onset)    Drug abuse Mother    Hepatitis Mother        Social History     Socioeconomic History    Marital status:    Tobacco Use    Smoking status: Current Some Day Smoker     Packs/day: 0.25     Years: 23.00     Pack years: 5.75     Types:  "Cigarettes    Smokeless tobacco: Never Used   Substance and Sexual Activity    Alcohol use: Not Currently    Drug use: Yes     Frequency: 4.0 times per week     Types: Methamphetamines, Marijuana     Comment: Paulette 1/month.  Meth- Injection, 1/w, injection in the right hand and wrist. Denies shared needles with other people, but occasional reuse at times. In addition endorses using a "fresh point" for sites of injection. Last use 3 days prior that was inhal    Sexual activity: Yes     Partners: Male     Birth control/protection: None     Social Determinants of Health     Financial Resource Strain: Medium Risk    Difficulty of Paying Living Expenses: Somewhat hard   Food Insecurity: No Food Insecurity    Worried About Running Out of Food in the Last Year: Never true    Ran Out of Food in the Last Year: Never true   Transportation Needs: Unmet Transportation Needs    Lack of Transportation (Medical): Yes    Lack of Transportation (Non-Medical): Yes   Physical Activity: Inactive    Days of Exercise per Week: 0 days    Minutes of Exercise per Session: 0 min   Stress: Stress Concern Present    Feeling of Stress : Very much   Social Connections: Unknown    Frequency of Communication with Friends and Family: Once a week    Frequency of Social Gatherings with Friends and Family: Once a week    Active Member of Clubs or Organizations: No    Marital Status: Living with partner   Housing Stability: Low Risk     Unable to Pay for Housing in the Last Year: No    Number of Places Lived in the Last Year: 2    Unstable Housing in the Last Year: No     Review of Systems   Constitutional: Negative for appetite change, chills, diaphoresis, fatigue, fever and unexpected weight change.   HENT: Negative for congestion, ear pain, hearing loss, sore throat and tinnitus.    Eyes: Negative for pain, redness and visual disturbance.   Respiratory: Negative for cough, chest tightness, shortness of breath and wheezing.  "   Cardiovascular: Negative for chest pain.   Gastrointestinal: Negative for abdominal pain, constipation, diarrhea, nausea and vomiting.   Endocrine: Negative for cold intolerance and heat intolerance.   Genitourinary: Negative for decreased urine volume, difficulty urinating, dysuria, flank pain, frequency, hematuria and urgency.   Musculoskeletal: Positive for back pain and myalgias. Negative for arthralgias and neck pain.   Skin: Negative for rash and wound.   Allergic/Immunologic: Negative for environmental allergies, food allergies and immunocompromised state.   Neurological: Negative for dizziness, facial asymmetry, weakness, light-headedness, numbness and headaches.   Hematological: Negative for adenopathy. Does not bruise/bleed easily.   Psychiatric/Behavioral: Negative for agitation, behavioral problems and confusion.     Objective:     Vital Signs (Most Recent):  Temp: 98.7 °F (37.1 °C) (02/15/22 0800)  Pulse: 97 (02/15/22 0933)  Resp: 16 (02/15/22 0905)  BP: (!) 106/48 (02/15/22 0933)  SpO2: 97 % (02/15/22 0800) Vital Signs (24h Range):  Temp:  [98.3 °F (36.8 °C)-99.3 °F (37.4 °C)] 98.7 °F (37.1 °C)  Pulse:  [] 97  Resp:  [16-20] 16  SpO2:  [97 %-100 %] 97 %  BP: (103-133)/(48-80) 106/48     Weight: 131.5 kg (290 lb)  Body mass index is 45.42 kg/m².    Estimated Creatinine Clearance: 209.4 mL/min (based on SCr of 0.5 mg/dL).    Physical Exam  Constitutional:       General: She is not in acute distress.     Appearance: Normal appearance. She is well-developed and well-nourished. She is not diaphoretic.   HENT:      Head: Normocephalic and atraumatic.   Cardiovascular:      Rate and Rhythm: Normal rate and regular rhythm.      Heart sounds: Normal heart sounds. No murmur heard.  No friction rub. No gallop.    Pulmonary:      Effort: Pulmonary effort is normal. No respiratory distress.      Breath sounds: Normal breath sounds. No wheezing or rales.   Abdominal:      General: Bowel sounds are normal.  There is no distension.      Palpations: Abdomen is soft. There is no mass.      Tenderness: There is no abdominal tenderness. There is no guarding or rebound.   Musculoskeletal:         General: No edema.   Skin:     General: Skin is warm and dry.   Neurological:      Mental Status: She is alert and oriented to person, place, and time.   Psychiatric:         Mood and Affect: Mood and affect normal.         Behavior: Behavior normal.         Significant Labs:   Blood Culture:   Recent Labs   Lab 02/14/22  1820   LABBLOO No Growth to date  No Growth to date     CBC:   Recent Labs   Lab 02/14/22  1819 02/15/22  0501   WBC 5.97 4.91   HGB 10.0* 9.8*   HCT 32.4* 31.6*   PLT 59* 57*     CMP:   Recent Labs   Lab 02/14/22  1819 02/15/22  0501   * 132*   K 4.3 4.1    102   CO2 27 25    75   BUN 16 12   CREATININE 0.7 0.5   CALCIUM 8.5* 8.0*   PROT 8.6*  --    ALBUMIN 2.5*  --    BILITOT 2.1*  --    ALKPHOS 130  --    AST 40  --    ALT 14  --    ANIONGAP 4* 5*   EGFRNONAA >60.0 >60.0     Wound Culture: No results for input(s): LABAERO in the last 4320 hours.  All pertinent labs within the past 24 hours have been reviewed.    Significant Imaging: I have reviewed all pertinent imaging results/findings within the past 24 hours.   MRI Spine Cervical-Thoracic-Lumbar W W/O Contrast (XPD) [[id number]] Resulted: 02/14/22 2334   Order Status: Completed Updated: 02/14/22 2336   Narrative:     EXAMINATION:   MRI SPINE CERVICAL-THORACIC-LUMBAR W W/O CONTRAST (XPD)     CLINICAL HISTORY:   Numbness or tingling, paresthesia (Ped 0-18y);infection;     TECHNIQUE:   Multiplanar MRI of the cervical spine, thoracic spine and lumbar spine without contrast.  Dose given is 10 cc Gadavist intravenous contrast.     COMPARISON:   Correlation with CT lumbar spine 02/14/2022     FINDINGS:   Cervical spine:     Cervical spinal cord is normal in course and caliber.  No evidence of edema or myelomalacia.  Cervicomedullary junction  appears adequately maintained.     Mild straightening of normal cervical lordosis.     No evidence of vertebral body acute fracture or marrow replacement.     Moderate disc space narrowing throughout the cervical spine.  Endplate degenerative changes at C5-6 and C6-7, most prominent at the inferior endplate of C6.     Minimal disc bulge at C2-3.  Mild diffuse posterior disc osteophyte complex at C3-4, C4-5, C5-6, C6-7 and C7-T1.     No significant central canal or foraminal narrowing.     No paraspinal mass or fluid collection.     No evidence of epidural hematoma or mass.     Thoracic spine:     Thoracic spinal cord is normal in course and caliber.  No evidence of edema or myelomalacia.     Thoracic vertebral bodies appear adequately maintained.  No evidence of fracture, edema or marrow replacement.     No significant disc bulge or protrusion in the thoracic spine.     Central canal appears adequately maintained.  Probable mild foraminal narrowing in the mid thoracic spine.     Slight motion artifact obscures visualization.     Lumbar spine:     Distal spinal cord ends normally at approximately the L1 level.     Posterior surgical fusion hardware from L3 through L5.  Pedicle screws at L3, L4 and L5 on the right and L3 and L5 on the left.  Posterior rods bilaterally.     Interbody spacers at L3-4 and L4-5.     Significant metallic artifact.     Soft tissue edema noted posteriorly.  There is loss of height of the L4 and L3 vertebral bodies.  This is better visualized on prior CT.     Discitis/osteomyelitis of L3-4 and to lesser degree L4-5 better visualized on prior CT.  Ample metallic artifact obscures these levels.     Epidural component is not completely excluded at the surgical levels.    Impression:       Suspected discitis/osteomyelitis of the lumbar spine at L3-4 and L4-5 with postoperative changes of posterior fusion from L3 through L5 with probable hardware infection, better visualized on prior CT.   Neurosurgical follow-up recommended.       Electronically signed by: José Ward   Date: 02/14/2022   Time: 23:34   MRI Brain W WO Contrast [[id number]] Resulted: 02/14/22 2259   Order Status: Completed Updated: 02/14/22 2301   Narrative:     EXAMINATION:   MRI BRAIN W WO CONTRAST     CLINICAL HISTORY:   Numbness or tingling, paresthesia (Ped 0-18y);Craniotomy, post-op;.     TECHNIQUE:   Multiplanar multisequence MR imaging of the brain was performed before and after the administration of  mL Gadavist  intravenous contrast.     COMPARISON:   None.     FINDINGS:   Intracranial compartment:     Ventricles and sulci are normal in size for age without evidence of hydrocephalus. No extra-axial blood or fluid collections.     Patchy nonspecific increased T2 and FLAIR signal in the periventricular and subcortical white matter bilaterally could be associated with chronic microvascular ischemic changes slightly greater than expected for the patient's chronologic age.  Demyelinating process such as multiple sclerosis could appear similar.  No evidence of active demyelination.  No diffusion positive areas and no pathologic enhancement on post-contrast imaging.     No mass lesion, acute hemorrhage, edema or acute infarct. No abnormal enhancement.     Normal vascular flow voids are preserved.     Skull/extracranial contents (limited evaluation): Bone marrow signal intensity is normal.    Impression:       1. No acute intracranial process.   2. Patchy nonspecific increased T2 and FLAIR signal in the periventricular and subcortical white matter bilaterally could be associated with chronic microvascular ischemic changes, slightly greater than expected for the patient's chronologic age.  A demyelinating process such as multiple sclerosis could appear similar.  No evidence of active demyelination.  Recommend clinical correlation.       Electronically signed by: José Ward   Date: 02/14/2022   Time: 22:59       Imaging  History    2022  Date Procedure Name Status Accession Number Location   02/14/22 10:43 PM MRI Spine Cervical-Thoracic-Lumbar W W/O Contrast (XPD) Final [id number] [facility]   02/14/22 10:42 PM MRI Brain W WO Contrast Final [id number] [facility]   02/14/22 01:11 PM CT Lumbar Spine Without Contrast Final [id number] [facility]   02/15/22 10:00 AM Echo Final [id number] [facility]

## 2022-02-15 NOTE — PLAN OF CARE
Pt received into IR Rm 4. Patient AAOx3, no distress noted, respirations even and unlabored, VS stable, will continue to monitor. Acceptance of education, consents signed, H/P done. Labs reviewed.

## 2022-02-15 NOTE — ASSESSMENT & PLAN NOTE
Rachel Zazueta is a 41 F with PMH hepatitis C, cirrhosis, pancytopenia, nicotine abuse, hx IVDU (last use 2 years ago), strep agalactae bacteremia, s/p L3-5 TLIF on 4/16/2021 who presents with persistent and progressive lumbar spondylodiscitis with resultant hardware failure and new scoliosis.     --Patient admitted to NPU on telemetry;       -q4h neurochecks on floor  --Plan for OR 2/21 for hardware removal, washout, extension of previous fusion.  --XR scoliosis and XR lumbar flex/ext ordered  --All labs and diagnostics reviewed  --Follow-up MRI Brain, C/T/L w/wo contrast   -No evidence of osteomyelitis, discitis, or epidural abscess within the cervical or thoracic spine   -No diffusion positive or abnormal enhancement within the brain   -MRI brain shows patchy nonspecific increased T2 and FLAIR signal in the periventricular and subcortical white matter bilaterally. Could be due to chronic microvascular ischemic changes vs demyelinating disease given patient's age. No evidence of active demyelination.   -DDD C4-7 with mild to moderate canal stenosis  --Full infectious w/u    -ESR 57, CRP 29, Procal 0.05, Lactate 1.1, BCx 2/14 NGTD, UA/Ucx pending    -Recommend holding antibiotics until infectious work-up complete unless patient is septic   -F/u ID recs  --S/p L3-4 disc biopsy and L3 bone biopsy with IR 2/15 - f/u cx  -- consult for risk stratification and medical optimization. Appreciate assistance.   --Hold anti-plt/coag medications  --Monitor for urinary retention - voiding spontaneous. Bladder scan prn.  --Continue current regimen for pain control   --Continue to monitor clinically, notify NSGY immediately with any changes in neuro status    Dispo: pending surgical intervention

## 2022-02-15 NOTE — SUBJECTIVE & OBJECTIVE
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    Wasp sting [allergen ext-venom-honey bee] Anaphylaxis    Adhesive Blisters    Strawberry Hives     Patient reports itching and hives    Iodine and iodide containing products Hives    Naproxen Other (See Comments)     Throat closing    Shellfish containing products     Nuts [tree nut] Rash       No current facility-administered medications on file prior to encounter.     Current Outpatient Medications on File Prior to Encounter   Medication Sig    diclofenac sodium (VOLTAREN) 1 % Gel Apply 2 g topically 4 (four) times daily.    polyethylene glycol (GLYCOLAX) 17 gram/dose powder Take 17 g by mouth once daily.    pregabalin (LYRICA) 50 MG capsule Take 1 capsule (50 mg total) by mouth 3 (three) times daily.    sertraline (ZOLOFT) 25 MG tablet Take 1 tablet (25 mg total) by mouth once daily.     Family History     Problem Relation (Age of Onset)    Drug abuse Mother    Hepatitis Mother        Tobacco Use    Smoking status: Current Some Day Smoker     Packs/day: 0.25     Years: 23.00     Pack years: 5.75     Types: Cigarettes    Smokeless tobacco: Never Used   Substance and Sexual Activity    Alcohol use: Not Currently    Drug use: Yes     Frequency: 4.0 times per week     Types: Methamphetamines, Marijuana     Comment: Paulette 1/month.  Meth- Injection, 1/w, injection in the right hand and wrist. Denies shared needles with other people, but occasional reuse at times. In addition endorses using a "fresh point" for sites of injection. Last use 3 " days prior that was inhal    Sexual activity: Yes     Partners: Male     Birth control/protection: None     Review of Systems   Constitutional: Negative for chills and fever.   HENT: Negative for congestion.    Eyes: Negative for visual disturbance.   Respiratory: Negative for chest tightness, shortness of breath and wheezing.    Cardiovascular: Negative for chest pain and palpitations.   Gastrointestinal: Negative for abdominal pain, nausea and vomiting.   Genitourinary: Negative for dysuria.   Musculoskeletal: Positive for back pain and gait problem. Negative for neck pain.   Skin: Negative for color change.   Neurological: Positive for weakness.   Psychiatric/Behavioral: Negative for agitation and confusion.     Objective:     Vital Signs (Most Recent):  Temp: 99.5 °F (37.5 °C) (02/15/22 1301)  Pulse: 93 (02/15/22 1301)  Resp: 16 (02/15/22 1301)  BP: (!) 108/58 (02/15/22 1301)  SpO2: 97 % (02/15/22 1301) Vital Signs (24h Range):  Temp:  [98.3 °F (36.8 °C)-99.5 °F (37.5 °C)] 99.5 °F (37.5 °C)  Pulse:  [] 93  Resp:  [14-20] 16  SpO2:  [97 %-100 %] 97 %  BP: (103-133)/(48-88) 108/58     Weight: 131.5 kg (290 lb)  Body mass index is 45.42 kg/m².    Physical Exam  Constitutional:       General: She is not in acute distress.     Appearance: She is toxic-appearing.   HENT:      Head: Normocephalic and atraumatic.      Nose: Nose normal.   Eyes:      Extraocular Movements: Extraocular movements intact.      Conjunctiva/sclera: Conjunctivae normal.   Cardiovascular:      Rate and Rhythm: Normal rate and regular rhythm.      Heart sounds: Normal heart sounds. No murmur heard.      Pulmonary:      Effort: Pulmonary effort is normal. No respiratory distress.      Breath sounds: No wheezing or rhonchi.   Abdominal:      General: Bowel sounds are normal. There is no distension.      Palpations: Abdomen is soft.      Tenderness: There is abdominal tenderness (RUQ).   Musculoskeletal:         General: Tenderness (midline  and paravertebral tenderness over lumbar back) present. No swelling.      Cervical back: Normal range of motion.   Skin:     General: Skin is warm and dry.      Comments: Well healed scar over lumbar back   Neurological:      General: No focal deficit present.      Mental Status: She is alert and oriented to person, place, and time.   Psychiatric:         Mood and Affect: Mood normal.         Behavior: Behavior normal.         Significant Labs:   All pertinent labs within the past 24 hours have been reviewed.  CBC:   Recent Labs   Lab 02/14/22  1819 02/15/22  0501   WBC 5.97 4.91   HGB 10.0* 9.8*   HCT 32.4* 31.6*   PLT 59* 57*     CMP:   Recent Labs   Lab 02/14/22  1819 02/15/22  0501   * 132*   K 4.3 4.1    102   CO2 27 25    75   BUN 16 12   CREATININE 0.7 0.5   CALCIUM 8.5* 8.0*   PROT 8.6*  --    ALBUMIN 2.5*  --    BILITOT 2.1*  --    ALKPHOS 130  --    AST 40  --    ALT 14  --    ANIONGAP 4* 5*   EGFRNONAA >60.0 >60.0       Significant Imaging: I have reviewed all pertinent imaging results/findings within the past 24 hours.

## 2022-02-15 NOTE — PROGRESS NOTES
Pharmacokinetic Initial Assessment: IV Vancomycin    Assessment/Plan:    Initiate Vancomycin 2000 mg IV every 12 hours  Desired empiric serum trough concentration is 15 to 20 mcg/mL  Draw vancomycin trough level 60 min prior to fourth dose on 2/17 at approximately 0300  Pharmacy will continue to follow and monitor vancomycin.      Please contact pharmacy at extension [phone] with any questions regarding this assessment.     Thank you for the consult,   Raiza Cabello       Patient brief summary:  Rachel Zazueta is a 41 y.o. female initiated on antimicrobial therapy with IV Vancomycin for treatment of suspected bone/joint infection    Drug Allergies:   Review of patient's allergies indicates:   Allergen Reactions    Bee venom protein (honey bee) Anaphylaxis     Patient reports she is allergic to bee stings.    Wasp sting [allergen ext-venom-honey bee] Anaphylaxis    Adhesive Blisters    Strawberry Hives     Patient reports itching and hives    Iodine and iodide containing products Hives    Naproxen Other (See Comments)     Throat closing    Shellfish containing products     Nuts [tree nut] Rash       Actual Body Weight:   131.5 kg    Renal Function:   Estimated Creatinine Clearance: 209.4 mL/min (based on SCr of 0.5 mg/dL).,     Dialysis Method (if applicable):  N/A    CBC (last 72 hours):  Recent Labs   Lab Result Units 02/14/22  1819 02/15/22  0501   WBC K/uL 5.97 4.91   Hemoglobin g/dL 10.0* 9.8*   Hematocrit % 32.4* 31.6*   Platelets K/uL 59* 57*   Gran % % 86.8* 82.3*   Lymph % % 6.7* 8.6*   Mono % % 5.2 7.7   Eosinophil % % 0.8 0.8   Basophil % % 0.2 0.4   Differential Method  Automated Automated       Metabolic Panel (last 72 hours):  Recent Labs   Lab Result Units 02/14/22  1819 02/15/22  0501   Sodium mmol/L 134* 132*   Potassium mmol/L 4.3 4.1   Chloride mmol/L 103 102   CO2 mmol/L 27 25   Glucose mg/dL 102 75   BUN mg/dL 16 12   Creatinine mg/dL 0.7 0.5   Albumin g/dL 2.5*  --    Total Bilirubin  mg/dL 2.1*  --    Alkaline Phosphatase U/L 130  --    AST U/L 40  --    ALT U/L 14  --        Drug levels (last 3 results):  No results for input(s): VANCOMYCINRA, VANCOMYCINPE, VANCOMYCINTR in the last 72 hours.    Microbiologic Results:  Microbiology Results (last 7 days)     Procedure Component Value Units Date/Time    AFB Culture & Smear [[id number]] Collected: 02/15/22 1128    Order Status: Sent Specimen: Biopsy from Back Updated: 02/15/22 1415    Fungus culture [[id number]] Collected: 02/15/22 1128    Order Status: Sent Specimen: Biopsy from Back Updated: 02/15/22 1414    Aerobic culture [[id number]] Collected: 02/15/22 1128    Order Status: Sent Specimen: Biopsy from Back Updated: 02/15/22 1414    Culture, Anaerobic [[id number]] Collected: 02/15/22 1128    Order Status: Sent Specimen: Biopsy from Back Updated: 02/15/22 1414    Gram stain [[id number]] Collected: 02/15/22 1128    Order Status: Sent Specimen: Biopsy from Back Updated: 02/15/22 1413    Blood Culture #1 **CANNOT BE ORDERED STAT** [[id number]] Collected: 02/14/22 1820    Order Status: Completed Specimen: Blood from Peripheral, Forearm, Right Updated: 02/15/22 0115     Blood Culture, Routine No Growth to date    Blood Culture #2 **CANNOT BE ORDERED STAT** [[id number]] Collected: 02/14/22 1820    Order Status: Completed Specimen: Blood from Peripheral, Antecubital, Right Updated: 02/15/22 0115     Blood Culture, Routine No Growth to date    Blood culture [[id number]] Collected: 02/14/22 2033    Order Status: Sent Specimen: Blood from Peripheral, Hand, Left Updated: 02/14/22 2034    Blood culture [[id number]] Collected: 02/14/22 2033    Order Status: Sent Specimen: Blood from Peripheral, Antecubital, Left Updated: 02/14/22 2034

## 2022-02-15 NOTE — SUBJECTIVE & OBJECTIVE
Interval History: NAEON. IR bone biopsy today of L3-4 disc space. Inflammatory markers elevated. Afebrile. HM consulted for optimization prior to OR and risk stratification. Patient had a lithotripsy done in July and reports back pain after ureteral stents removed, worsened since October and was unable to stand without significant pain. She reports LLE pain that radiates to the thigh and lateral aspect, sometimes to the foot. Denies b/b dysfunction, neck pain, UE pain. ID consulted. Plan for OR Monday for washout, removal of hardware and extension of fusion.    Medications:  Continuous Infusions:   sodium chloride 0.9% Stopped (02/15/22 1050)     Scheduled Meds:   cefTRIAXone (ROCEPHIN) IVPB  2 g Intravenous Q24H    polyethylene glycol  17 g Oral Daily    pregabalin  50 mg Oral TID    sertraline  25 mg Oral Daily    vancomycin (VANCOCIN) IVPB  2,000 mg Intravenous Q12H     PRN Meds:acetaminophen, dextrose 10%, dextrose 10%, glucagon (human recombinant), glucose, glucose, glucose, HYDROmorphone, melatonin, oxyCODONE, Pharmacy to dose Vancomycin consult **AND** vancomycin - pharmacy to dose       Objective:     Weight: 131.5 kg (290 lb)  Body mass index is 45.42 kg/m².  Vital Signs (Most Recent):  Temp: 99.5 °F (37.5 °C) (02/15/22 1301)  Pulse: 93 (02/15/22 1301)  Resp: 16 (02/15/22 1427)  BP: (!) 108/58 (02/15/22 1301)  SpO2: 97 % (02/15/22 1301) Vital Signs (24h Range):  Temp:  [98.3 °F (36.8 °C)-99.5 °F (37.5 °C)] 99.5 °F (37.5 °C)  Pulse:  [] 93  Resp:  [14-20] 16  SpO2:  [97 %-100 %] 97 %  BP: (103-133)/(48-88) 108/58                Resp Rate Total:  [9 br/min] 9 br/min    Female External Urinary Catheter 02/14/22 2300 (Active)   Output (mL) 650 mL 02/15/22 0645       Physical Exam    Neurosurgery Physical Exam    General: well developed, well nourished, no distress.   Head: normocephalic, atraumatic  Neck: No tracheal deviation.   Neurologic: Alert and oriented. Thought content appropriate.  GCS:  Motor: 6/Verbal: 5/Eyes: 4 GCS Total: 15  Mental Status: Awake, Alert, Oriented x 4  Language: No aphasia  Speech: No dysarthria  Cranial nerves: face symmetric, tongue midline, CN II-XII grossly intact.   Eyes: pupils equal, round, reactive to light with accomodation, EOMI.   Pulmonary: normal respirations, no signs of respiratory distress  Abdomen: soft, non-distended, not tender to palpation  Sensory: intact to light touch throughout  Motor Strength: Moves all extremities spontaneously with good tone.  Pain limited weakness to proximal LLE. Full strength upper and lower extremities. No abnormal movements seen.     Strength  Deltoids Triceps Biceps Wrist Extension Wrist Flexion Hand    Upper: R 5/5 5/5 5/5 5/5 5/5 5/5    L 5/5 5/5 5/5 5/5 5/5 5/5     Iliopsoas Quadriceps Knee  Flexion Tibialis  anterior Gastro- cnemius EHL   Lower: R 5/5 5/5 5/5 5/5 5/5 5/5    L 4+/5 4+/5 4+/5 5/5 5/5 5/5     Parker: absent  Clonus: absent  Vascular: No LE edema.   Skin: Skin is warm, dry and intact.    Incision well healed. No surrounding erythema or edema. No palpable hematoma or underlying fluid collection.        Significant Labs:  Recent Labs   Lab 02/14/22  1819 02/15/22  0501    75   * 132*   K 4.3 4.1    102   CO2 27 25   BUN 16 12   CREATININE 0.7 0.5   CALCIUM 8.5* 8.0*     Recent Labs   Lab 02/14/22  1819 02/15/22  0501   WBC 5.97 4.91   HGB 10.0* 9.8*   HCT 32.4* 31.6*   PLT 59* 57*     Recent Labs   Lab 02/14/22 2032   INR 1.3*   APTT 29.9     Microbiology Results (last 7 days)     Procedure Component Value Units Date/Time    AFB Culture & Smear [[id number]] Collected: 02/15/22 1128    Order Status: Sent Specimen: Biopsy from Back Updated: 02/15/22 1415    Fungus culture [[id number]] Collected: 02/15/22 1128    Order Status: Sent Specimen: Biopsy from Back Updated: 02/15/22 1414    Aerobic culture [[id number]] Collected: 02/15/22 1128    Order Status: Sent Specimen: Biopsy from Back Updated:  02/15/22 1414    Culture, Anaerobic [[id number]] Collected: 02/15/22 1128    Order Status: Sent Specimen: Biopsy from Back Updated: 02/15/22 1414    Gram stain [[id number]] Collected: 02/15/22 1128    Order Status: Sent Specimen: Biopsy from Back Updated: 02/15/22 1413    Blood Culture #1 **CANNOT BE ORDERED STAT** [[id number]] Collected: 02/14/22 1820    Order Status: Completed Specimen: Blood from Peripheral, Forearm, Right Updated: 02/15/22 0115     Blood Culture, Routine No Growth to date    Blood Culture #2 **CANNOT BE ORDERED STAT** [[id number]] Collected: 02/14/22 1820    Order Status: Completed Specimen: Blood from Peripheral, Antecubital, Right Updated: 02/15/22 0115     Blood Culture, Routine No Growth to date    Blood culture [[id number]] Collected: 02/14/22 2033    Order Status: Sent Specimen: Blood from Peripheral, Hand, Left Updated: 02/14/22 2034    Blood culture [[id number]] Collected: 02/14/22 2033    Order Status: Sent Specimen: Blood from Peripheral, Antecubital, Left Updated: 02/14/22 2034        Recent Lab Results       02/15/22  1000   02/15/22  0501   02/14/22 2038 02/14/22 2032 02/14/22 1820        Procalcitonin       0.05  Comment: A concentration < 0.25 ng/mL represents a low risk of bacterial   infection.  Procalcitonin may not be accurate among patients with localized   infection, recent trauma or major surgery, immunosuppressed state,   invasive fungal infection, renal dysfunction. Decisions regarding   initiation or continuation of antibiotic therapy should not be based   solely on procalcitonin levels.           Albumin               Alkaline Phosphatase               ALT               Anion Gap   5             Ao peak coty 1.48               Ao VTI 28.26               aPTT       29.9  Comment: aPTT therapeutic range = 39-69 seconds         AST               AV valve area 3.60               AV mean gradient 6               AV index (prosthetic) 0.91               AV peak gradient 9                AV Velocity Ratio 0.92               Baso #   0.02             Basophil %   0.4             BILIRUBIN TOTAL               Blood Culture, Routine         No Growth to date  [P]                No Growth to date  [P]       BSA 2.49               BUN   12             Calcium   8.0             Chloride   102             CO2   25             CPK               Creatinine   0.5             CRP       29.2         Left Ventricle Relative Wall Thickness 0.27               Differential Method   Automated             E/A ratio 1.01               E/E' ratio 8.67               eGFR if    >60.0             eGFR if non    >60.0  Comment: Calculation used to obtain the estimated glomerular filtration  rate (eGFR) is the CKD-EPI equation.                EF 60               Eos #   0.0             Eosinophil %   0.8             E wave deceleration time 159.03               FS 36               Glucose   75             Gran # (ANC)   4.0             Gran %   82.3             Group & Rh               Hematocrit   31.6             Hemoglobin   9.8             Immature Grans (Abs)   0.01  Comment: Mild elevation in immature granulocytes is non specific and   can be seen in a variety of conditions including stress response,   acute inflammation, trauma and pregnancy. Correlation with other   laboratory and clinical findings is essential.               Immature Granulocytes   0.2             INDIRECT ESSENCE               INR       1.3  Comment: Coumadin Therapy:  2.0 - 3.0 for INR for all indicators except mechanical heart valves  and antiphospholipid syndromes which should use 2.5 - 3.5.           IVS 0.71               LA WIDTH 4.73               Lactate, Jose               Left Atrium Major Axis 5.59               Left Atrium Minor Axis 5.61               LA size 4.65               LA volume 104.69                92.69               LA Volume Index 44.2               LA Volume Index (Mod) 39.1        "        LVOT area 3.9               LV LATERAL E/E' RATIO 7.43               LV SEPTAL E/E' RATIO 10.40               LV Diastolic Volume 157.34               LV Diastolic Volume Index 66.39               LVIDd 5.66               LVIDs 3.63               LV mass 152.58               LV Mass Index 64               LVOT diameter 2.24               LVOT peak wilmer 1.36               LVOT stroke volume 101.78               LVOT peak VTI 25.84               LV Systolic Volume 55.60               LV Systolic Volume Index 23.5               Lymph #   0.4             Lymph %   8.6             MCH   28.0             MCHC   31.0             MCV   90             Mean e' 0.12               Mono #   0.4             Mono %   7.7             MPV   9.2             MV valve area p 1/2 method 4.77               MV "A" wave duration 9.42               MV Peak A Wilmer 1.03               MV Peak E Wilmer 1.04               MV stenosis pressure 1/2 time 46.12               nRBC   0             Platelets   57             Potassium   4.1             Preg Test, Ur     Negative           PROTEIN TOTAL               Protime       13.4         PV Peak D Wilmer 0.63               PV Peak S Wilmer 0.91               Pulm vein S/D ratio 1.44               Posterior Wall 0.77                Acceptable     Yes           Right Atrial Pressure (from IVC) 3               RA Major Axis 5.30               RA Width 3.01               RBC   3.50             RDW   18.3             RV S' 18.16               RVDD 2.82               SARS-CoV-2 RNA, Amplification, Qual               Sed Rate       57         Sinus 3.12               Sodium   132             TAPSE 3.47               TDI SEPTAL 0.10               TDI LATERAL 0.14               Triscuspid Valve Regurgitation Peak Gradient 25               TR Max Wilmer 2.52               TV rest pulmonary artery pressure 28               WBC   4.91                              02/14/22  1819   02/14/22  1754     "    Procalcitonin 0.06  Comment: A concentration < 0.25 ng/mL represents a low risk of bacterial   infection.  Procalcitonin may not be accurate among patients with localized   infection, recent trauma or major surgery, immunosuppressed state,   invasive fungal infection, renal dysfunction. Decisions regarding   initiation or continuation of antibiotic therapy should not be based   solely on procalcitonin levels.           Albumin 2.5         Alkaline Phosphatase 130         ALT 14         Anion Gap 4         Ao peak coty         Ao VTI         aPTT         AST 40         AV valve area         AV mean gradient         AV index (prosthetic)         AV peak gradient         AV Velocity Ratio         Baso # 0.01         Basophil % 0.2         BILIRUBIN TOTAL 2.1  Comment: For infants and newborns, interpretation of results should be based  on gestational age, weight and in agreement with clinical  observations.    Premature Infant recommended reference ranges:  Up to 24 hours.............<8.0 mg/dL  Up to 48 hours............<12.0 mg/dL  3-5 days..................<15.0 mg/dL  6-29 days.................<15.0 mg/dL           Blood Culture, Routine         BSA         BUN 16         Calcium 8.5         Chloride 103         CO2 27         CPK 64         Creatinine 0.7         CRP 30.8         Left Ventricle Relative Wall Thickness         Differential Method Automated         E/A ratio         E/E' ratio         eGFR if  >60.0         eGFR if non  >60.0  Comment: Calculation used to obtain the estimated glomerular filtration  rate (eGFR) is the CKD-EPI equation.            EF         Eos # 0.1         Eosinophil % 0.8         E wave deceleration time         FS         Glucose 102         Gran # (ANC) 5.2         Gran % 86.8         Group & Rh A POS         Hematocrit 32.4         Hemoglobin 10.0         Immature Grans (Abs) 0.02  Comment: Mild elevation in immature granulocytes is non  "specific and   can be seen in a variety of conditions including stress response,   acute inflammation, trauma and pregnancy. Correlation with other   laboratory and clinical findings is essential.           Immature Granulocytes 0.3         INDIRECT ESSENCE NEG         INR         IVS         LA WIDTH         Lactate, Jose 1.1  Comment: Falsely low lactic acid results can be found in samples   containing >=13.0 mg/dL total bilirubin and/or >=3.5 mg/dL   direct bilirubin.           Left Atrium Major Axis         Left Atrium Minor Axis         LA size         LA volume         LA Volume Index         LA Volume Index (Mod)         LVOT area         LV LATERAL E/E' RATIO         LV SEPTAL E/E' RATIO         LV Diastolic Volume         LV Diastolic Volume Index         LVIDd         LVIDs         LV mass         LV Mass Index         LVOT diameter         LVOT peak wilmer         LVOT stroke volume         LVOT peak VTI         LV Systolic Volume         LV Systolic Volume Index         Lymph # 0.4         Lymph % 6.7         MCH 27.5         MCHC 30.9         MCV 89         Mean e'         Mono # 0.3         Mono % 5.2         MPV 8.4         MV valve area p 1/2 method         MV "A" wave duration         MV Peak A Wilmer         MV Peak E Wilmer         MV stenosis pressure 1/2 time         nRBC 0         Platelets 59         Potassium 4.3         Preg Test, Ur         PROTEIN TOTAL 8.6         Protime         PV Peak D Wilmer         PV Peak S Wilmer         Pulm vein S/D ratio         Posterior Wall          Acceptable   Yes       Right Atrial Pressure (from IVC)         RA Major Axis         RA Width         RBC 3.63         RDW 18.6         RV S'         RVDD         SARS-CoV-2 RNA, Amplification, Qual   Negative       Sed Rate 82         Sinus         Sodium 134         TAPSE         TDI SEPTAL         TDI LATERAL         Triscuspid Valve Regurgitation Peak Gradient         TR Max Wilmer         TV rest pulmonary " artery pressure         WBC 5.97                [P] - Preliminary Result           All pertinent labs from the last 24 hours have been reviewed.    Significant Diagnostics:  I have reviewed all pertinent imaging results/findings within the past 24 hours.

## 2022-02-15 NOTE — ASSESSMENT & PLAN NOTE
Rachel Zazueta has been lost to follow up since surgery. Today she presents with persistent and progressive lumbar spondylodiscitis with resultant hardware failure and new scoliosis. She claims to have quit IV drug abuse but continues to abuse nicotine and marijuana. She has severe back and left leg pain when standing/walking. On exam she has pain limited weakness of both legs and exquisite midline tenderness over her well healed lumbar scar. I recommend direct hospital admission with consultation of ID and hospital medicine. She will need full CNS MRI with contrast and a needle biopsy of infection nidus. Surgical plans to follow studies.    --Patient admitted to NPU on telemetry;      -q1h neurochecks in ICU, q2h neurochecks in stepdown, q4h neurochecks on floor  --All labs and diagnostics reviewed  --Follow-up MRI Brain, C/T/L w/wo contrast  --Full infectious w/u to include ESR/CRP/Procalcitonin, CXR, UA/UCx, BCx x2, TTE vs ROSA;      -Reccomend holding antibiotics until infectious work-up complete unless patient is septic      -Consult Infectious Disesae; appreciate reccs  --IR consult for disc bx.  --HM consult for risk stratification and medical optimization for possible OR.   --Hold anti-plt/coag medications  --Monitor for urinary retention  --Follow-up full pre-op labs (CBC/CMP/PT-INR/PTT/T&S)  --NPO at midnight for possible IR Bx,  --Continue to monitor clinically, notify NSGY immediately with any changes in neuro status    Dispo: Admit to NPU.

## 2022-02-15 NOTE — ASSESSMENT & PLAN NOTE
Hx of epidural abscess 4/2021 with blood and surgical cultures positive for group B strep.  Echo 2/15 negative for endocarditis. ID following for antibiotic recs.     Recommendations:  - f/u blood cultures and biopsy  - f/u urine tox screen  - if she decompensates, start broad spectrum antibiotics with vanc and cefepime

## 2022-02-15 NOTE — HPI
Rachel Zazueta is a 42 y/o F with a PMH of cirrhosis, Hep C, anemia, polysubstance abuse, and epidural abscess s/p L3-L4 laminectomy and L3-L5 TLIF (4/2021; blood and surgical cultures positive for group B strep) who was admitted from [facility] for workup of severe back pain and LE weakness. She reports that she has had worsening pain in her lower back and hips since October. She also reports shooting pain down her left leg. She ambulates with a walker at home due to the pain and difficulty with balance. She denies bowel or bladder incontinence. She denies CP, SOB, nausea, vomiting. Denies hx of HTN or DM. She was lost to follow up after her previous surgery, stating that she did not go to her appointments because she was feeling good. She states that her last IV meth use was 8/2020, and last meth use was after her previous surgery 4/2021 (however tox screen 7/2021 positive for amphetamines). She reports marijuana use approx 3x per week and smokes cigarettes 1/2 pack per day. She denies use of other substances including cocaine. MRI C/T/L spine showed suspected discitis/osteomyelitis at L3-4 and L4-5 with probable hardware infection. L3-4 disc biopsy obtained by IR. Hospital medicine was consulted for preop risk stratification and medical optimization.

## 2022-02-15 NOTE — H&P
[facility] - Emergency Dept  Neuorsurgery  History and Physical     Patient Name: Rachel Zazueta  MRN: [id number]  Admission Date: 2/14/2022  Attending Physician: Guille Templeton MD  Primary Care Physician: Dannielle Merino DO    Patient information was obtained from patient, past medical records and ER records.     Subjective:     Chief Complaint/Reason for Admission: Osteodiscitis     HPI:  Rachel Zazueta is a 41 y.o.  female with PMHx of Hepatitis C, liver cirrhosis, anemia, and polysubstance abuse, who presents to clinic for follow up with new imaging s/p L3 and L4 laminectomy for evacuation of epidural abscess and L3-L5 TLIF on 04/16/2021.     The pt c/o worsening back and left leg pain since October. She states that she is no longer using IV drugs. States only using marijuana. Describes the left leg pain as a shock down the leg when standing for 10 minutes. Denies taking any antibiotics. Endorses new weakness in the legs. Denies b/b dysfunction. Denies new neck pain. She ambulates with a walker at home. States that she smoke.    She presents to ED as direct admit from clinic.       (Not in a hospital admission)      Review of patient's allergies indicates:   Allergen Reactions    Bee venom protein (honey bee) Anaphylaxis     Patient reports she is allergic to bee stings.   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  Meth- Injection, 1/w, injection in the right hand and wrist. Denies shared needles with other people, but occasional reuse at times. In addition endorses using a "fresh point" for sites of injection. Last use 3 days prior that was inhal    Sexual activity: Yes     Partners: Male     Birth control/protection: None     Review of Systems     Review of Systems   Constitutional: Negative.    HENT: Negative.    Eyes: Negative.    Respiratory: Negative.    Cardiovascular: Negative.    Gastrointestinal: Negative.    Endocrine: Negative.    Genitourinary: Negative.    Musculoskeletal: Positive for back pain and myalgias. Negative for gait problem and neck pain.   Skin: Negative.    Allergic/Immunologic: Negative.    Neurological: Positive for weakness. Negative for light-headedness, numbness and headaches.   Hematological: Negative.    Psychiatric/Behavioral: Negative.    Objective:     Weight: 131.5 kg (290 lb)  Body mass index is 45.42 kg/m².  Vital Signs (Most Recent):  Temp: 98.3 °F (36.8 °C) (02/14/22 1548)  Pulse: 104 (02/14/22 1548)  Resp: 18 (02/14/22 1548)  BP: 133/80 (02/14/22 1548)  SpO2: 100 % (02/14/22 1548) Vital Signs (24h Range):  Temp:  [98.3 °F (36.8 °C)] 98.3 °F (36.8 °C)  Pulse:  [104] 104  Resp:  [18] 18  SpO2:  [100 %] 100 %  BP: (133)/(80) 133/80                          Physical Exam    Neurosurgery Physical Exam     Vital signs: All nursing notes and vital signs reviewed -- afebrile, vital signs stable.  Constitutional: Patient sitting comfortably in chair. Appears well developed and well " nourished.  Skin: Exposed areas are intact without abnormal markings, rashes or other lesions.  HEENT: Normocephalic. Normal conjunctivae.  Cardiovascular: Normal rate and regular rhythm.  Respiratory: Chest wall rises and falls symmetrically, without signs of respiratory distress.  Abdomen: Soft and non-tender.  Extremities: Warm and without edema. Calves supple, non-tender.  Psych/Behavior: Normal affect.     Neurological:     Mental status: Alert and oriented. Conversational and appropriate.       Cranial Nerves: VFF to confrontation. PERRL. EOMI without nystagmus. Facial STLT normal and symmetric. Strong, symmetric muscles of mastication. Facial strength full and symmetric. Hearing equal bilaterally to finger rub. Palate and uvula rise and fall normally in midline. Shoulder shrug 5/5 strength. Tongue midline.      Motor:     Upper:   Deltoids Triceps Biceps WE WF      R 5/5 5/5 5/5 5/5 5/5 5/5     L 5/5 5/5 5/5 5/5 5/5 5/5      Lower:   HF KE KF DF PF EHL     R 5/5 5/5 5/5 5/5 5/5 5/5     L 5/5 5/5 5/5 5/5 5/5 5/5      Sensory: Intact sensation to light touch in all extremities. Romberg negative.     Well healed lumbar incision  Severe TTP  Pain limited 4 throughout both legs.     Reflexes:          DTR: 2+ symmetrically throughout.     Parker's: Negative.     Babinski's: Negative.     Clonus: Negative.     Cerebellar: Finger-to-nose and rapid alternating movements normal. Gait stable, fluid.     Spine:     Posture: Head well aligned over pelvis in front and side views.  No focal or global spinal deformity visible on inspection. Shoulders and hips even. No obvious leg length discrepancy. No scapula winging.     Bending: Full ROM with forward, back and lateral bending. No rib prominence with forward bend.     Cervical:      ROM: Full with flexion, extension, lateral rotation and ear-to-shoulder bend.      Midline TTP: Negative.     Spurling's test: Negative.     Lhermitte's: Negative.     Thoracic:      Midline TTP: Negative     Lumbar:     Midline TTP: Negative     Straight Leg Test: Negative     Crossed Straight Leg Test: Negative     Sciatic notch tenderness: Negative.     Other:     SI joint TTP: Negative.     Greater trochanter TTP: Negative.     Tenderness with external/internal hip rotation: Negative.      Significant Labs:  Recent Labs   Lab 02/14/22 1819      *   K 4.3      CO2 27   BUN 16   CREATININE 0.7   CALCIUM 8.5*     Recent Labs   Lab 02/14/22 1819   WBC 5.97   HGB 10.0*   HCT 32.4*   PLT 59*     No results for input(s): LABPT, INR, APTT in the last 48 hours.  Microbiology Results (last 7 days)     Procedure Component Value Units Date/Time    Blood Culture #1 **CANNOT BE ORDERED STAT** [[id number]] Collected: 02/14/22 1820    Order Status: Sent Specimen: Blood from Peripheral, Forearm, Right Updated: 02/14/22 1831    Blood Culture #2 **CANNOT BE ORDERED STAT** [[id number]] Collected: 02/14/22 1820    Order Status: Sent Specimen: Blood from Peripheral, Antecubital, Right Updated: 02/14/22 1831        All pertinent labs from the last 24 hours have been reviewed.    Significant Diagnostics:  I have reviewed all pertinent imaging results/findings within the past 24 hours.   CT Lumbar Spine Without Contrast    Result Date: 2/14/2022  Compared to prior studies, there are increased destructive endplate changes at L3-4, new destructive endplate changes at L4-5, and worsening alignment abnormalities, with adjacent soft tissue edema and lymphadenopathy, highly concerning for discitis osteomyelitis. New lucencies at the bone-metal interfaces of all pedicle screws and both interbody spacers, concerning for loosening, which could be septic or aseptic. Evaluation for soft tissue and epidural collections is limited by CT technique and degraded by streak artifact. This report was flagged in Epic as abnormal. Electronically signed by: Riki Flores Date:    02/14/2022 Time:    13:36      Assessment  and Plan:     Spondylodiscitis  Rachel Zazueta has been lost to follow up since surgery. Today she presents with persistent and progressive lumbar spondylodiscitis with resultant hardware failure and new scoliosis. She claims to have quit IV drug abuse but continues to abuse nicotine and marijuana. She has severe back and left leg pain when standing/walking. On exam she has pain limited weakness of both legs and exquisite midline tenderness over her well healed lumbar scar. I recommend direct hospital admission with consultation of ID and hospital medicine. She will need full CNS MRI with contrast and a needle biopsy of infection nidus. Surgical plans to follow studies.    --Patient admitted to NPU on telemetry;      -q1h neurochecks in ICU, q2h neurochecks in stepdown, q4h neurochecks on floor  --All labs and diagnostics reviewed  --Follow-up MRI Brain, C/T/L w/wo contrast  --Full infectious w/u to include ESR/CRP/Procalcitonin, CXR, UA/UCx, BCx x2, TTE vs ROSA;      -Reccomend holding antibiotics until infectious work-up complete unless patient is septic      -Consult Infectious Disesae; appreciate reccs  --IR consult for disc bx.  --HM consult for risk stratification and medical optimization for possible OR.   --Hold anti-plt/coag medications  --Monitor for urinary retention  --Follow-up full pre-op labs (CBC/CMP/PT-INR/PTT/T&S)  --NPO at midnight for possible IR Bx,  --Continue to monitor clinically, notify NSGY immediately with any changes in neuro status    Dispo: Admit to NPU.         Lee Patel MD  Neurosurgery  [facility] - Emergency Dept

## 2022-02-15 NOTE — ASSESSMENT & PLAN NOTE
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 Pt denies ever receiving treatment for HCV. She has been referred to hepatology previously but did not follow up.   MELD-Na score: 17 at 2/15/2022  5:01 AM  MELD score: 12 at 2/15/2022  5:01 AM  Calculated from:  Serum Creatinine: 0.5 mg/dL (Using min of 1 mg/dL) at 2/15/2022  5:01 AM  Serum Sodium: 132 mmol/L at 2/15/2022  5:01 AM  Total Bilirubin: 2.1 mg/dL at 2/14/2022  6:19 PM  INR(ratio): 1.3 at 2/14/2022  8:32 PM  Age: 41 years    Recommendations:  - U/S abdomen with doppler  - daily CMP and INR  - will need hepatology referral on discharge

## 2022-02-15 NOTE — PROGRESS NOTES
Pt arrived to MPU for post procedure recovery. Report received procedure RN. Pt oriented to unit and staff.  Stretcher locked and placed in lowest position. Calming environment promoted. Comfort measures provided. Pt resting comfortably. Dressing CDI. VSS. No acute events. See flow sheets for post procedure monitoring. Pt denies pain/discomfort.  Will continue to monitor.

## 2022-02-15 NOTE — PLAN OF CARE
[facility] - Neurosurgery (Hospital)  Initial Discharge Assessment       Primary Care Provider: Dannielle Merino DO    Admission Diagnosis: Discitis of lumbar region [M46.46]  Complication of procedure, initial encounter [T81.9XXA]    Admission Date: 2/14/2022  Expected Discharge Date: 2/18/2022    Discharge Barriers Identified: None    Payor: MEDICAID / Plan: [employer]ETNA [facility] / Product Type: Managed Medicaid /     Extended Emergency Contact Information  Primary Emergency Contact: Minor Reynoso  Address: [address] [place] [address]  Home Phone: [phone]  Mobile Phone: [phone]  Relation: Significant other    Discharge Plan A: Home with family  Discharge Plan B: Rehab    Pharmacy:  [email or web address] [address]  [facility] [address]  Phone: [phone] Fax: [phone]      Initial Assessment (most recent)     Adult Discharge Assessment - 02/15/22 1545        Discharge Assessment    Assessment Type Discharge Planning Assessment     Confirmed/corrected address, phone number and insurance Yes     Confirmed Demographics Correct on Facesheet     Source of Information patient     Communicated SHIRA with patient/caregiver Date not available/Unable to determine     Reason For Admission spondylodiscitis     Lives With significant other     Do you expect to return to your current living situation? Yes     Do you have help at home or someone to help you manage your care at home? Yes     Who are your caregiver(s) and their phone number(s)? Minor Reynoso - S/O [phone]     Prior to hospitilization cognitive status: Alert/Oriented     Current cognitive status: Alert/Oriented     Walking or Climbing Stairs Difficulty ambulation difficulty, requires equipment     Mobility Management rollator and cane     Dressing/Bathing Difficulty none     Equipment Currently Used at Home rollator;cane, quad     Readmission  within 30 days? No     Patient currently being followed by outpatient case management? No     Do you currently have service(s) that help you manage your care at home? No     Do you take prescription medications? Yes     Do you have prescription coverage? Yes     Coverage MEDICAID - [employer] [facility] [facility] [place]     Do you have any problems affording any of your prescribed medications? No     Is the patient taking medications as prescribed? yes     Who is going to help you get home at discharge? family     How do you get to doctors appointments? family or friend will provide     Are you on dialysis? No     Do you take coumadin? No     Discharge Plan A Home with family     Discharge Plan B Rehab     DME Needed Upon Discharge  none     Discharge Plan discussed with: Patient     Discharge Barriers Identified None        Relationship/Environment    Name(s) of Who Lives With Patient Minor

## 2022-02-15 NOTE — CONSULTS
[facility] - Neurosurgery ([facility])  [facility] Medicine  Consult Note    Patient Name: Rachel Zazueta  MRN: [id number]  Admission Date: 2/14/2022  Hospital Length of Stay: 1 days  Attending Physician: Guille Templeton MD   Primary Care Provider: Dannielle Merino DO           Patient information was obtained from patient and past medical records.     Inpatient consult to Hospitalist  Consult performed by: Cecille Reddy MD  Consult ordered by: Lee Patel MD        Subjective:     Principal Problem: <principal problem not specified>    Chief Complaint:   Chief Complaint   Patient presents with    Post-op Problem     Back surg in 2020, infection of hardware with failure, sent here for admission        HPI: Rachel Zazueta is a 42 y/o F with a PMH of cirrhosis, Hep C, anemia, polysubstance abuse, and epidural abscess s/p L3-L4 laminectomy and L3-L5 TLIF (4/2021; blood and surgical cultures positive for group B strep) who was admitted from NSGY clinic for workup of severe back pain and LE weakness. She reports that she has had worsening pain in her lower back and hips since October. She also reports shooting pain down her left leg. She ambulates with a walker at home due to the pain and difficulty with balance. She denies bowel or bladder incontinence. She denies CP, SOB, nausea, vomiting. Denies hx of HTN or DM. She was lost to follow up after her previous surgery, stating that she did not go to her appointments because she was feeling good. She states that her last IV meth use was 8/2020, and last meth use was after her previous surgery 4/2021 (however tox screen 7/2021 positive for amphetamines). She reports marijuana use approx 3x per week and smokes cigarettes 1/2 pack per day. She denies use of other substances including cocaine. MRI C/T/L spine showed suspected discitis/osteomyelitis at L3-4 and L4-5 with probable hardware infection. L3-4 disc biopsy obtained by IR. [facility] medicine was consulted  for preop risk stratification and medical optimization.       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    Wasp sting [allergen ext-venom-honey bee] Anaphylaxis    Adhesive Blisters    Strawberry Hives     Patient reports itching and hives    Iodine and iodide containing products Hives    Naproxen Other (See Comments)     Throat closing    Shellfish containing products     Nuts [tree nut] Rash       No current facility-administered medications on file prior to encounter.     Current Outpatient Medications on File Prior to Encounter   Medication Sig    diclofenac sodium (VOLTAREN) 1 % Gel Apply 2 g topically 4 (four) times daily.    polyethylene glycol (GLYCOLAX) 17 gram/dose powder Take 17 g by mouth once daily.    pregabalin (LYRICA) 50 MG capsule Take 1 capsule (50 mg total) by mouth 3 (three) times daily.    sertraline (ZOLOFT) 25 MG tablet Take 1 tablet (25 mg total) by mouth once daily.     Family History     Problem Relation (Age of Onset)    Drug abuse Mother    Hepatitis Mother        Tobacco Use    Smoking status: Current Some Day Smoker     Packs/day: 0.25     Years: 23.00     Pack years: 5.75     Types: Cigarettes    Smokeless tobacco: Never Used   Substance and Sexual Activity    Alcohol use: Not Currently    Drug use: Yes     Frequency: 4.0 times per week     Types: Methamphetamines, Marijuana     Comment: Paulette 1/month.  Meth- Injection, 1/w, injection in the right hand and wrist. Denies shared needles with other people, but occasional reuse at times. In addition  "endorses using a "fresh point" for sites of injection. Last use 3 days prior that was inhal    Sexual activity: Yes     Partners: Male     Birth control/protection: None     Review of Systems   Constitutional: Negative for chills and fever.   HENT: Negative for congestion.    Eyes: Negative for visual disturbance.   Respiratory: Negative for chest tightness, shortness of breath and wheezing.    Cardiovascular: Negative for chest pain and palpitations.   Gastrointestinal: Negative for abdominal pain, nausea and vomiting.   Genitourinary: Negative for dysuria.   Musculoskeletal: Positive for back pain and gait problem. Negative for neck pain.   Skin: Negative for color change.   Neurological: Positive for weakness.   Psychiatric/Behavioral: Negative for agitation and confusion.     Objective:     Vital Signs (Most Recent):  Temp: 99.5 °F (37.5 °C) (02/15/22 1301)  Pulse: 93 (02/15/22 1301)  Resp: 16 (02/15/22 1301)  BP: (!) 108/58 (02/15/22 1301)  SpO2: 97 % (02/15/22 1301) Vital Signs (24h Range):  Temp:  [98.3 °F (36.8 °C)-99.5 °F (37.5 °C)] 99.5 °F (37.5 °C)  Pulse:  [] 93  Resp:  [14-20] 16  SpO2:  [97 %-100 %] 97 %  BP: (103-133)/(48-88) 108/58     Weight: 131.5 kg (290 lb)  Body mass index is 45.42 kg/m².    Physical Exam  Constitutional:       General: She is not in acute distress.     Appearance: She is toxic-appearing.   HENT:      Head: Normocephalic and atraumatic.      Nose: Nose normal.   Eyes:      Extraocular Movements: Extraocular movements intact.      Conjunctiva/sclera: Conjunctivae normal.   Cardiovascular:      Rate and Rhythm: Normal rate and regular rhythm.      Heart sounds: Normal heart sounds. No murmur heard.      Pulmonary:      Effort: Pulmonary effort is normal. No respiratory distress.      Breath sounds: No wheezing or rhonchi.   Abdominal:      General: Bowel sounds are normal. There is no distension.      Palpations: Abdomen is soft.      Tenderness: There is abdominal " tenderness (RUQ).   Musculoskeletal:         General: Tenderness (midline and paravertebral tenderness over lumbar back) present. No swelling.      Cervical back: Normal range of motion.   Skin:     General: Skin is warm and dry.      Comments: Well healed scar over lumbar back   Neurological:      General: No focal deficit present.      Mental Status: She is alert and oriented to person, place, and time.   Psychiatric:         Mood and Affect: Mood normal.         Behavior: Behavior normal.         Significant Labs:   All pertinent labs within the past 24 hours have been reviewed.  CBC:   Recent Labs   Lab 02/14/22  1819 02/15/22  0501   WBC 5.97 4.91   HGB 10.0* 9.8*   HCT 32.4* 31.6*   PLT 59* 57*     CMP:   Recent Labs   Lab 02/14/22  1819 02/15/22  0501   * 132*   K 4.3 4.1    102   CO2 27 25    75   BUN 16 12   CREATININE 0.7 0.5   CALCIUM 8.5* 8.0*   PROT 8.6*  --    ALBUMIN 2.5*  --    BILITOT 2.1*  --    ALKPHOS 130  --    AST 40  --    ALT 14  --    ANIONGAP 4* 5*   EGFRNONAA >60.0 >60.0       Significant Imaging: I have reviewed all pertinent imaging results/findings within the past 24 hours.    Assessment/Plan:     Pre-op evaluation  40 y/o F with hx of epidural abscess s/p L3 and L4 laminectomy and L3-L5 TLIF (4/2021) admitted from NSGY clinic with worsening lumbar back pain and LE weakness. MRI C/T/L spine showed suspected discitis/osteomyelitis at L3-4 and L4-5 with probable hardware infection. IR consulted for biopsy. TTE 2/15 with EF 60%.    Surgical Risk Assessment    Active cardiac issues:  Active decompensated heart failure? No   Unstable angina?  No   Significant uncontrolled arrhythmias? No   Severe valvular heart disease-Aortic or Mitral Stenosis? No   Recent MI or coronary revascularization < 30 days? No     Cardiac Risk Factors  History of CAD/ischemic heart disease? No   History of cerebrovascular disease? No   History of compensated heart failure? No   Type 2 diabetes  requiring insulin? No   Serum Creatinine > 2? No   Total cardiac risk factors 0     Functional mets limited by pain, able to walk with walker.    < 4* METs -unable to walk > 2 blocks on level ground without stopping due to symptoms  - eating, dressing, toileting, walking indoors, light housework. POOR   > 4* METs -climbing > 1 flight of stairs without stopping  -walking up hill > 1-2 blocks  -scrubbing floors  -moving furniture  - golf, bowling, dancing or tennis  -running short distance MODERATE to EXCELLENT   * performance of any one of the activities       Cardiovascular Risk Assessment:  Non-emergent surgery.  No active cardiac problems (such as unstable angina, decompensated heart failure, significant uncontrolled arrhythmias or severe valvular disease).  Intermediate risk surgery.  Functional Status: limited by pain  Her revised cardiac risk index is 0 (3.9% 30-day risk of death, MI, or cardiac arrest).       Recommendation:  - Proceed to Surgery  - F/u EKG  - Will continue to follow        Thrombocytopenia  Chronic, likely due to cirrhosis.     Recommendations:  - daily CBC      Spondylodiscitis  Hx of epidural abscess 4/2021 with blood and surgical cultures positive for group B strep.  Echo 2/15 negative for endocarditis. ID following for antibiotic recs.     Recommendations:  - f/u blood cultures and biopsy  - f/u urine tox screen  - if she decompensates, start broad spectrum antibiotics with vanc and cefepime       Chronic hepatitis C with cirrhosis  PMH cirrhosis 2/2 HCV. Pt denies ever receiving treatment for HCV. She has been referred to hepatology previously but did not follow up.   MELD-Na score: 17 at 2/15/2022  5:01 AM  MELD score: 12 at 2/15/2022  5:01 AM  Calculated from:  Serum Creatinine: 0.5 mg/dL (Using min of 1 mg/dL) at 2/15/2022  5:01 AM  Serum Sodium: 132 mmol/L at 2/15/2022  5:01 AM  Total Bilirubin: 2.1 mg/dL at 2/14/2022  6:19 PM  INR(ratio): 1.3 at 2/14/2022  8:32 PM  Age: 41  "years    Recommendations:  - U/S abdomen with doppler  - daily CMP and INR  - will need hepatology referral on discharge       VTE Risk Mitigation (From admission, onward)         Ordered     IP VTE HIGH RISK PATIENT  Once         02/14/22 1936     Place sequential compression device  Until discontinued         02/14/22 1936                    Thank you for your consult. I will follow-up with patient. Please contact us if you have any additional questions.     Please secure chat [facility] Medicine M ("Medicine Consult Only") or contact me directly for any additional questions or concerns.      Cecille Rhodes MD  Department of Hospital Medicine   [facility] - Neurosurgery (Hospital)    "

## 2022-02-15 NOTE — HOSPITAL COURSE
2/15: NAEON. IR bone biopsy today of L3-4 disc space. Inflammatory markers elevated. Afebrile. HM consulted for optimization prior to OR and risk stratification. Patient had a lithotripsy done in July and reports back pain after ureteral stents removed, worsened since October and was unable to stand without significant pain. She reports LLE pain that radiates to the thigh and lateral aspect, sometimes to the foot. Denies b/b dysfunction. ID consulted. Plan for OR Monday for washout, removal of hardware and extension of fusion.  2/16: NAEON. AFVSS. Reports increased back pain with standing after the biopsy yesterday. Currently tolerable. Voiding spontaneously. Plan for OR Monday 2/21.  2/17: NAEON. AFVSS. Back pain tolerable today. Reports leg pain with standing or sitting. XR scoliosis today.   2/18: NAEON. Pt denies new complaints today. XR scoliosis pending. Pending OR Monday 2/21 2/19: NAEON. AFVSS. Exam stable. Pain controlled. Tolerating PO. Voiding and stooling without difficulty. Plan for OR Monday.   2/20: NAEON. AFVSS. Exam stable. Pain controlled. OR tomorrow.   2/21: OR today for hardware removal and washout.  2/23: NAEON. Patient stepped down to floor care. S/p hardware removal and washout. Tachycardia noted. EKG with sinus tach. Patient denies CP, SOB, or lightheadedness. Reports incisional lower back pain. Denies radiating pain. Voiding spontaneously. Pain regimen adjusted.   2/24: NAEON. Afebrile. Hypotensive this am to 98/54. Other vitals wnl. H&H 7.3 & 24.0. HV drains with 290 & 170 cc output. Patient denies sob, lightheadedness, CP, lethargy. Will transfuse 1u prbcs today. Repeat type&screen ordered. She reports continued back pain that radiates down her LLE. Denies new numbness or weakness. Voiding spontaneously and has had a BM.   2/15: Afebrile, vitals stable. Wound vac seal leak overnight, tegaderm applied and proper seal obtained. S/p 1u prbc transfusion yesterday. H&H 7.7 & 24.7 this am. HV  drains to gravity with 235 and 20 output. Neuro exam is stable. UA ordered for hazy, dark fawn urine. Denies dysuria.   2/26: SAMRA neurostable, drain output 135/110. Pending OR 3/2  2/27: SAMRA neurostable, drain output 110/80. Some minor leakage from drain site. Pending OR 3/2.  2/28: NAEON. Neuro exam is stable. Reports continued back pain that radiates down her LLE. Denies new numbness or weakness. Continues to void spontaneously and has had Bms. Tolerating PO. H&H 7.6 & 24.2. Plt 78. Protime 13.2, INR 1.3. May need platelet transfusion prior to OR 3/2. WV removed, HV drains in place. Up in chair today with brace in place.   3/1: NAEON. Neuro stable. H&H 7.4 & 24.1 today. Transfusing pRBCs and platelets prior to surgery tomorrow am. HV drains to remain in place. Discussed surgical plan, risks, benefits and alternatives with patient. Patient voiced understanding.   3/3 POD1 s/p T10-Pelvis posterior instrumented fusion with L5-S1 TLIF. Tolerated procedure well, two drains to remain in place, plts low, tranfuse 2 packs today. Stable to TTF today  3/4: POD 2, transfused platelets and PRBC yesterday, plt still low. Neuro stable. Drain and WV in place till POD 5  3/5: POD 3. NAEON. AFVSS. Exam stable. Pain improved. Saturating well on room air. Tolerating PO without n/v. Adequate UOP.   3/6: POD 4. NAEON. AFVSS. Exam stable. Pain controlled. Tolerating OOB. Tolerating PO, voiding and having BM  3/7: POD 5. NAEON. Afebrile, VSS. H&H and platelets stable. Neuro exam is stable. Patient sitting up in chair upon entry to room, states her pain is well controlled. Tolerating PO, voiding spontaneously and has had BM. WV removed today.  3/8: NAEON. AFVSS. Platelets 60. Will transfuse today. Pain controlled. Voiding spontaneously. Up in chair.   3/9: NAEON. AFVSS. Platelets 107. H&H uptrending. Pain well controlled. Neuro exam stable. Voiding spontaneously, +BM. Up in chair and working well with PT/OT. HV drains may be  removed today pending output. US BLE to r/o DVT given patient has been in hospital for a while.  3/10: ALEYDA AFVSS. Platelets 47, will transfuse 1u plts. Hemoglobin 7.2, will transfuse 1u pRBCs. Patient reports feeling significantly more tired today. Denies sob. Neuro exam is stable. Both HV drains removed. US BLE negative. Lasix 40 mg given yesterday for LE edema. Reports she takes Lasix 20 mg at home, unable to find in chart. Will need f/u with PCP for continued management. Pending authorization for rehab.   3/11: ALEYDA MEDINAVSS. Platelets 48, hemoglobin 7.7 this am s/p platelet and blood transfusions yesterday. Patient chronically pancytopenic. F/u with hematology arranged and patient started on ferrous sulfate daily. Asymptomatic today. Neuro exam is stable. F/u appointments with neurosurgery arranged and plan of care discussed with patient. Pending authorization for rehab.   3/12: DEVANTEEON. VSS. H/H stable today. Pt is asymptomatic and remains neuro stable. Rehab pending  3/13: NAEON. VSS. H/H stable today. Pt is asymptomatic and remains neuro stable. Rehab pending  3/14: NAEON. Neuro stable. Denies weakness or numbness. Plan for discharge to rehab tomorrow.   3/15: SAMRA. AFVSS. Remains pan-cytopenic but stable. Pt reports pain controlled, denies new weakness or numbness. Neuro exam stable. Discharging to SNF today. Discussed postop and discharge instructions, all questions answered, 6 week postop appointment scheduled. Follow up appts with ID and Heme/Onc scheduled. Encouraged to call our office with any questions or concerns.

## 2022-02-15 NOTE — PROCEDURES
Radiology Post-Procedure Note    Pre Op Diagnosis: LBP    Post Op Diagnosis: Same    Procedure: Lumbar intervertebral disc biopsy     Procedure performed by: Guille Gimenez MD    Written Informed Consent Obtained: Yes    Specimen Removed: NO    Estimated Blood Loss: Minimal    Findings:     Level injected: L3-4 intervertebral disc   Needle used: 13 gauge Kyphon VTM bone biopsy needle   Right postero-lateral apporach    Sample sent for microbiology and pathology     Patient tolerated procedure well.        Tomy Frey MD     Neuro Endovascular Surgery Fellow   [facility]

## 2022-02-15 NOTE — HPI
Rachel Zazueta is a 41 year old female with HCV, liver, cirrhosis, anemia, and polysubstance abuse admitted in April 2021 with GBS bacteremia and L3-4 spondylodiscitis with associated epidural abscess s/p posterior spinal fusion and evacuation of epidural abscess on 4/16.  She was treated with Ceftriaxone with a plan to complete 8 weeks of antibiotics (end date 6/11/21). Initially at [facility] and  discharged from Rehab with a PICC line to finish antibiotics at home. Patient reports only completing 4-5 weeks of IV antibiotics as her PICC line accidentally fell at at home. She did not inform ID or go to the ED for replacement as her back pain had resolved and she felt well. She missed her last ID appt.    In October her back pain returned and radiates down her left leg. She was seen in NSGY 2/14 for this.  CT ordered reviewed and showed increased destructive endplate changes at L3-4, new destructive endplate changes at L4-5, and worsening alignment abnormalities, with adjacent soft tissue edema and lymphadenopathy, highly concerning for discitis osteomyelitis. New lucencies at the bone-metal interfaces of all pedicle screws and both interbody spacers, concerning for loosening. Patient was admitted for further evaluation and treatment.    Patient afebrile without a leukocytosis. Sed rate 57/CRP 29. She stated that she is no longer using IV drugs and only using marijuana.  Denies recent abx use.    MRI C/T/L spine - Suspected discitis/osteomyelitis of the lumbar spine at L3-4 and L4-5 with postoperative changes of posterior fusion from L3 through L5 with probable hardware infection.  IR consulted and performed L3-4 intervertebral disc biopsy. Cx are negative.

## 2022-02-15 NOTE — H&P
Inpatient Radiology Pre-procedure Note    History of Present Illness:  Rachel Zazueta is a 41 y.o. female with medical history of Hepatitis C, liver cirrhosis, polysubstance abuse, L3 and L4 laminectomy - with admission concerns for back pain. MRI spine revealing discitis/osteomyelitis of the lumbar spine at L3-4 and L4-5. Hence plans for fluroscopy guided L3-4 intervertebral disc biopsy.     Admission H&P reviewed.    Past Medical History:   Diagnosis Date    Anemia     Diskitis     Hep C w/o coma, chronic     IV drug abuse     Liver cirrhosis      Past Surgical History:   Procedure Laterality Date    BACK SURGERY      benine tumor removal      forehead, age 9    CHOLECYSTECTOMY      KIDNEY SURGERY         Review of Systems:   As documented in primary team H&P    Home Meds:   Prior to Admission medications    Medication Sig Start Date End Date Taking? Authorizing Provider   diclofenac sodium (VOLTAREN) 1 % Gel Apply 2 g topically 4 (four) times daily. 12/31/21   Femi Galicia, KADEN   polyethylene glycol (GLYCOLAX) 17 gram/dose powder Take 17 g by mouth once daily. 1/26/22   Dannielle Merino DO   pregabalin (LYRICA) 50 MG capsule Take 1 capsule (50 mg total) by mouth 3 (three) times daily. 1/3/22 7/4/22  Susannah Mackenzie PA-C   sertraline (ZOLOFT) 25 MG tablet Take 1 tablet (25 mg total) by mouth once daily. 2/9/22 3/11/22  Dannielle Merino DO     Scheduled Meds:    polyethylene glycol  17 g Oral Daily    pregabalin  50 mg Oral TID    sertraline  25 mg Oral Daily     Continuous Infusions:    sodium chloride 0.9% 125 mL/hr at 02/15/22 0559     PRN Meds:acetaminophen, dextrose 10%, dextrose 10%, glucagon (human recombinant), glucose, glucose, glucose, HYDROmorphone, melatonin, oxyCODONE  Anticoagulants/Antiplatelets: no anticoagulation    Allergies:   Review of patient's allergies indicates:   Allergen Reactions    Bee venom protein (honey bee) Anaphylaxis     Patient reports she is  allergic to bee stings.    Wasp sting [allergen ext-venom-honey bee] Anaphylaxis    Adhesive Blisters    Strawberry Hives     Patient reports itching and hives    Iodine and iodide containing products Hives    Naproxen Other (See Comments)     Throat closing    Shellfish containing products     Nuts [tree nut] Rash     Sedation Hx: have not been any systemic reactions    Labs:  Recent Labs   Lab 02/14/22 2032   INR 1.3*       Recent Labs   Lab 02/15/22  0501   WBC 4.91   HGB 9.8*   HCT 31.6*   MCV 90   PLT 57*      Recent Labs   Lab 02/14/22  1819 02/14/22  1819 02/15/22  0501      < > 75   *   < > 132*   K 4.3   < > 4.1      < > 102   CO2 27   < > 25   BUN 16   < > 12   CREATININE 0.7   < > 0.5   CALCIUM 8.5*   < > 8.0*   ALT 14  --   --    AST 40  --   --    ALBUMIN 2.5*  --   --    BILITOT 2.1*  --   --     < > = values in this interval not displayed.         Vitals:  Temp: 98.7 °F (37.1 °C) (02/15/22 0800)  Pulse: 101 (02/15/22 0800)  Resp: 16 (02/15/22 0905)  BP: (!) 106/48 (02/15/22 0800)  SpO2: 97 % (02/15/22 0800)     Physical Exam:  ASA: 3  Mallampati: 2    General: no acute distress  Mental Status: alert and oriented to person, place and time  HEENT: normocephalic, atraumatic  Chest: unlabored breathing  Heart: regular heart rate  Abdomen: nondistended  Extremity: moves all extremities      Plan:  Sedation Plan: Up to moderate    Patient will undergo: fluroscopy guided L3-4 intervertebral disc biopsy.         Tomy Frey MD     Neuro Endovascular Surgery Fellow   [facility]

## 2022-02-15 NOTE — PLAN OF CARE
Pt tolerates procedure well. VSS. NAD. Pt transferred to [facility] [place] [facility]. Report to be given at bedside.

## 2022-02-15 NOTE — CONSULTS
[facility] - Neurosurgery ([facility])  Infectious Disease  Consult Note    Patient Name: Rachel Zazueta  MRN: [id number]  Admission Date: 2/14/2022  Hospital Length of Stay: 1 days  Attending Physician: Guille Templeton MD  Primary Care Provider: Dannielle Merino DO       Inpatient consult to Infectious Diseases  Consult performed by: ROSA ELENA Mckeon Jr.  Consult ordered by: Lee Patel MD        Consult received.  Full consult to follow.    Pj Toussaint PA-C

## 2022-02-15 NOTE — SUBJECTIVE & OBJECTIVE
"(Not in a hospital admission)      Review of patient's allergies indicates:   Allergen Reactions    Bee venom protein (honey bee) Anaphylaxis     Patient reports she is allergic to bee stings.   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  Meth- Injection, 1/w, injection in the right hand and wrist. Denies shared needles with other people, but occasional reuse at times. In addition endorses using a "fresh point" for sites of injection. Last use 3 days prior that was inhal    Sexual activity: Yes     Partners: Male     Birth control/protection: None     Review of Systems     Review of Systems   Constitutional: Negative.    HENT: Negative.    Eyes: Negative.    Respiratory: Negative.    Cardiovascular: Negative.    Gastrointestinal: Negative.    Endocrine: Negative.    Genitourinary: Negative.    Musculoskeletal: Positive for back pain and myalgias. Negative for gait problem and neck pain.   Skin: Negative.  "   Allergic/Immunologic: Negative.    Neurological: Positive for weakness. Negative for light-headedness, numbness and headaches.   Hematological: Negative.    Psychiatric/Behavioral: Negative.    Objective:     Weight: 131.5 kg (290 lb)  Body mass index is 45.42 kg/m².  Vital Signs (Most Recent):  Temp: 98.3 °F (36.8 °C) (02/14/22 1548)  Pulse: 104 (02/14/22 1548)  Resp: 18 (02/14/22 1548)  BP: 133/80 (02/14/22 1548)  SpO2: 100 % (02/14/22 1548) Vital Signs (24h Range):  Temp:  [98.3 °F (36.8 °C)] 98.3 °F (36.8 °C)  Pulse:  [104] 104  Resp:  [18] 18  SpO2:  [100 %] 100 %  BP: (133)/(80) 133/80                          Physical Exam    Neurosurgery Physical Exam     Vital signs: All nursing notes and vital signs reviewed -- afebrile, vital signs stable.  Constitutional: Patient sitting comfortably in chair. Appears well developed and well nourished.  Skin: Exposed areas are intact without abnormal markings, rashes or other lesions.  HEENT: Normocephalic. Normal conjunctivae.  Cardiovascular: Normal rate and regular rhythm.  Respiratory: Chest wall rises and falls symmetrically, without signs of respiratory distress.  Abdomen: Soft and non-tender.  Extremities: Warm and without edema. Calves supple, non-tender.  Psych/Behavior: Normal affect.     Neurological:     Mental status: Alert and oriented. Conversational and appropriate.       Cranial Nerves: VFF to confrontation. PERRL. EOMI without nystagmus. Facial STLT normal and symmetric. Strong, symmetric muscles of mastication. Facial strength full and symmetric. Hearing equal bilaterally to finger rub. Palate and uvula rise and fall normally in midline. Shoulder shrug 5/5 strength. Tongue midline.      Motor:     Upper:   Deltoids Triceps Biceps WE WF      R 5/5 5/5 5/5 5/5 5/5 5/5     L 5/5 5/5 5/5 5/5 5/5 5/5      Lower:   HF KE KF DF PF EHL     R 5/5 5/5 5/5 5/5 5/5 5/5     L 5/5 5/5 5/5 5/5 5/5 5/5      Sensory: Intact sensation to light touch in all  extremities. Romberg negative.     Well healed lumbar incision  Severe TTP  Pain limited 4 throughout both legs.     Reflexes:          DTR: 2+ symmetrically throughout.     Parker's: Negative.     Babinski's: Negative.     Clonus: Negative.     Cerebellar: Finger-to-nose and rapid alternating movements normal. Gait stable, fluid.     Spine:     Posture: Head well aligned over pelvis in front and side views.  No focal or global spinal deformity visible on inspection. Shoulders and hips even. No obvious leg length discrepancy. No scapula winging.     Bending: Full ROM with forward, back and lateral bending. No rib prominence with forward bend.     Cervical:      ROM: Full with flexion, extension, lateral rotation and ear-to-shoulder bend.      Midline TTP: Negative.     Spurling's test: Negative.     Lhermitte's: Negative.     Thoracic:     Midline TTP: Negative     Lumbar:     Midline TTP: Negative     Straight Leg Test: Negative     Crossed Straight Leg Test: Negative     Sciatic notch tenderness: Negative.     Other:     SI joint TTP: Negative.     Greater trochanter TTP: Negative.     Tenderness with external/internal hip rotation: Negative.      Significant Labs:  Recent Labs   Lab 02/14/22 1819      *   K 4.3      CO2 27   BUN 16   CREATININE 0.7   CALCIUM 8.5*     Recent Labs   Lab 02/14/22 1819   WBC 5.97   HGB 10.0*   HCT 32.4*   PLT 59*     No results for input(s): LABPT, INR, APTT in the last 48 hours.  Microbiology Results (last 7 days)     Procedure Component Value Units Date/Time    Blood Culture #1 **CANNOT BE ORDERED STAT** [[id number]] Collected: 02/14/22 1820    Order Status: Sent Specimen: Blood from Peripheral, Forearm, Right Updated: 02/14/22 1831    Blood Culture #2 **CANNOT BE ORDERED STAT** [[id number]] Collected: 02/14/22 1820    Order Status: Sent Specimen: Blood from Peripheral, Antecubital, Right Updated: 02/14/22 1831        All pertinent labs from the last 24 hours  have been reviewed.    Significant Diagnostics:  I have reviewed all pertinent imaging results/findings within the past 24 hours.   CT Lumbar Spine Without Contrast    Result Date: 2/14/2022  Compared to prior studies, there are increased destructive endplate changes at L3-4, new destructive endplate changes at L4-5, and worsening alignment abnormalities, with adjacent soft tissue edema and lymphadenopathy, highly concerning for discitis osteomyelitis. New lucencies at the bone-metal interfaces of all pedicle screws and both interbody spacers, concerning for loosening, which could be septic or aseptic. Evaluation for soft tissue and epidural collections is limited by CT technique and degraded by streak artifact. This report was flagged in Epic as abnormal. Electronically signed by: Riki Flores Date:    02/14/2022 Time:    13:36

## 2022-02-15 NOTE — ASSESSMENT & PLAN NOTE
42 y/o F with hx of epidural abscess s/p L3 and L4 laminectomy and L3-L5 TLIF (4/2021) admitted from [facility] clinic with worsening lumbar back pain and LE weakness. MRI C/T/L spine showed suspected discitis/osteomyelitis at L3-4 and L4-5 with probable hardware infection. IR consulted for biopsy. TTE 2/15 with EF 60%.    Surgical Risk Assessment    Active cardiac issues:  Active decompensated heart failure? No   Unstable angina?  No   Significant uncontrolled arrhythmias? No   Severe valvular heart disease-Aortic or Mitral Stenosis? No   Recent MI or coronary revascularization < 30 days? No     Cardiac Risk Factors  History of CAD/ischemic heart disease? No   History of cerebrovascular disease? No   History of compensated heart failure? No   Type 2 diabetes requiring insulin? No   Serum Creatinine > 2? No   Total cardiac risk factors 0     Functional mets limited by pain, able to walk with walker.    < 4* METs -unable to walk > 2 blocks on level ground without stopping due to symptoms  - eating, dressing, toileting, walking indoors, light housework. POOR   > 4* METs -climbing > 1 flight of stairs without stopping  -walking up hill > 1-2 blocks  -scrubbing floors  -moving furniture  - golf, bowling, dancing or tennis  -running short distance MODERATE to EXCELLENT   * performance of any one of the activities       Cardiovascular Risk Assessment:  Non-emergent surgery.  No active cardiac problems (such as unstable angina, decompensated heart failure, significant uncontrolled arrhythmias or severe valvular disease).  Intermediate risk surgery.  Functional Status: limited by pain  Her revised cardiac risk index is 0 (3.9% 30-day risk of death, MI, or cardiac arrest).       Recommendation:  - Proceed to Surgery  - F/u EKG  - Will continue to follow

## 2022-02-15 NOTE — HPI
Rachel Zazueta is a 41 y.o.  female with PMHx of Hepatitis C, liver cirrhosis, pancytopenia, and polysubstance abuse, who presents to clinic for follow up with new imaging s/p L3 and L4 laminectomy for evacuation of epidural abscess and L3-L5 TLIF on 04/16/2021.     The pt c/o worsening back and left leg pain since October. She states that she is no longer using IV drugs. States only using marijuana. Describes the left leg pain as a shock down the leg when standing for 10 minutes. Denies taking any antibiotics. Endorses new weakness in the legs. Denies b/b dysfunction. Denies new neck pain. She ambulates with a walker at home. States that she smoke.    She presents to ED as direct admit from clinic.

## 2022-02-16 LAB
ALBUMIN SERPL BCP-MCNC: 2.1 G/DL (ref 3.5–5.2)
ALP SERPL-CCNC: 111 U/L (ref 55–135)
ALT SERPL W/O P-5'-P-CCNC: 11 U/L (ref 10–44)
AMPHET+METHAMPHET UR QL: ABNORMAL
ANION GAP SERPL CALC-SCNC: 6 MMOL/L (ref 8–16)
AST SERPL-CCNC: 35 U/L (ref 10–40)
BARBITURATES UR QL SCN>200 NG/ML: NEGATIVE
BASOPHILS # BLD AUTO: 0.01 K/UL (ref 0–0.2)
BASOPHILS NFR BLD: 0.4 % (ref 0–1.9)
BENZODIAZ UR QL SCN>200 NG/ML: ABNORMAL
BILIRUB SERPL-MCNC: 1.4 MG/DL (ref 0.1–1)
BUN SERPL-MCNC: 10 MG/DL (ref 6–20)
BZE UR QL SCN: NEGATIVE
CALCIUM SERPL-MCNC: 8 MG/DL (ref 8.7–10.5)
CANNABINOIDS UR QL SCN: NEGATIVE
CHLORIDE SERPL-SCNC: 104 MMOL/L (ref 95–110)
CO2 SERPL-SCNC: 23 MMOL/L (ref 23–29)
CREAT SERPL-MCNC: 0.6 MG/DL (ref 0.5–1.4)
CREAT UR-MCNC: 34 MG/DL (ref 15–325)
DIFFERENTIAL METHOD: ABNORMAL
EOSINOPHIL # BLD AUTO: 0.1 K/UL (ref 0–0.5)
EOSINOPHIL NFR BLD: 1.8 % (ref 0–8)
ERYTHROCYTE [DISTWIDTH] IN BLOOD BY AUTOMATED COUNT: 18.3 % (ref 11.5–14.5)
EST. GFR  (AFRICAN AMERICAN): >60 ML/MIN/1.73 M^2
EST. GFR  (NON AFRICAN AMERICAN): >60 ML/MIN/1.73 M^2
ETHANOL UR-MCNC: <10 MG/DL
GLUCOSE SERPL-MCNC: 130 MG/DL (ref 70–110)
HCT VFR BLD AUTO: 31 % (ref 37–48.5)
HGB BLD-MCNC: 9.7 G/DL (ref 12–16)
HIV 1+2 AB+HIV1 P24 AG SERPL QL IA: NEGATIVE
IMM GRANULOCYTES # BLD AUTO: 0.02 K/UL (ref 0–0.04)
IMM GRANULOCYTES NFR BLD AUTO: 0.7 % (ref 0–0.5)
INR PPP: 1.3 (ref 0.8–1.2)
LIPASE SERPL-CCNC: 37 U/L (ref 4–60)
LYMPHOCYTES # BLD AUTO: 0.5 K/UL (ref 1–4.8)
LYMPHOCYTES NFR BLD: 17.8 % (ref 18–48)
MCH RBC QN AUTO: 27.9 PG (ref 27–31)
MCHC RBC AUTO-ENTMCNC: 31.3 G/DL (ref 32–36)
MCV RBC AUTO: 89 FL (ref 82–98)
METHADONE UR QL SCN>300 NG/ML: ABNORMAL
MONOCYTES # BLD AUTO: 0.3 K/UL (ref 0.3–1)
MONOCYTES NFR BLD: 10.7 % (ref 4–15)
NEUTROPHILS # BLD AUTO: 1.9 K/UL (ref 1.8–7.7)
NEUTROPHILS NFR BLD: 68.6 % (ref 38–73)
NRBC BLD-RTO: 0 /100 WBC
OPIATES UR QL SCN: ABNORMAL
PCP UR QL SCN>25 NG/ML: NEGATIVE
PLATELET # BLD AUTO: 61 K/UL (ref 150–450)
PMV BLD AUTO: 9.7 FL (ref 9.2–12.9)
POTASSIUM SERPL-SCNC: 3.7 MMOL/L (ref 3.5–5.1)
PROT SERPL-MCNC: 7.3 G/DL (ref 6–8.4)
PROTHROMBIN TIME: 13.2 SEC (ref 9–12.5)
RBC # BLD AUTO: 3.48 M/UL (ref 4–5.4)
SODIUM SERPL-SCNC: 133 MMOL/L (ref 136–145)
TOXICOLOGY INFORMATION: ABNORMAL
WBC # BLD AUTO: 2.81 K/UL (ref 3.9–12.7)

## 2022-02-16 PROCEDURE — 99233 SBSQ HOSP IP/OBS HIGH 50: CPT | Mod: ,,, | Performed by: PHYSICIAN ASSISTANT

## 2022-02-16 PROCEDURE — 25000003 PHARM REV CODE 250: Performed by: STUDENT IN AN ORGANIZED HEALTH CARE EDUCATION/TRAINING PROGRAM

## 2022-02-16 PROCEDURE — 11000001 HC ACUTE MED/SURG PRIVATE ROOM

## 2022-02-16 PROCEDURE — 25000003 PHARM REV CODE 250

## 2022-02-16 PROCEDURE — 99233 SBSQ HOSP IP/OBS HIGH 50: CPT | Mod: ,,, | Performed by: HOSPITALIST

## 2022-02-16 PROCEDURE — 99232 PR SUBSEQUENT HOSPITAL CARE,LEVL II: ICD-10-PCS | Mod: ,,, | Performed by: PHYSICIAN ASSISTANT

## 2022-02-16 PROCEDURE — 63600175 PHARM REV CODE 636 W HCPCS: Performed by: PHYSICIAN ASSISTANT

## 2022-02-16 PROCEDURE — 36415 COLL VENOUS BLD VENIPUNCTURE: CPT

## 2022-02-16 PROCEDURE — 63600175 PHARM REV CODE 636 W HCPCS: Performed by: NEUROLOGICAL SURGERY

## 2022-02-16 PROCEDURE — 85610 PROTHROMBIN TIME: CPT

## 2022-02-16 PROCEDURE — 85025 COMPLETE CBC W/AUTO DIFF WBC: CPT | Performed by: STUDENT IN AN ORGANIZED HEALTH CARE EDUCATION/TRAINING PROGRAM

## 2022-02-16 PROCEDURE — 83690 ASSAY OF LIPASE: CPT

## 2022-02-16 PROCEDURE — 25000003 PHARM REV CODE 250: Performed by: NEUROLOGICAL SURGERY

## 2022-02-16 PROCEDURE — 99233 PR SUBSEQUENT HOSPITAL CARE,LEVL III: ICD-10-PCS | Mod: ,,, | Performed by: HOSPITALIST

## 2022-02-16 PROCEDURE — 99233 PR SUBSEQUENT HOSPITAL CARE,LEVL III: ICD-10-PCS | Mod: ,,, | Performed by: PHYSICIAN ASSISTANT

## 2022-02-16 PROCEDURE — 99232 SBSQ HOSP IP/OBS MODERATE 35: CPT | Mod: ,,, | Performed by: PHYSICIAN ASSISTANT

## 2022-02-16 PROCEDURE — 80307 DRUG TEST PRSMV CHEM ANLYZR: CPT

## 2022-02-16 PROCEDURE — 80053 COMPREHEN METABOLIC PANEL: CPT

## 2022-02-16 RX ORDER — PANTOPRAZOLE SODIUM 20 MG/1
40 TABLET, DELAYED RELEASE ORAL DAILY
Status: DISCONTINUED | OUTPATIENT
Start: 2022-02-16 | End: 2022-03-15 | Stop reason: HOSPADM

## 2022-02-16 RX ADMIN — SERTRALINE HYDROCHLORIDE 25 MG: 25 TABLET ORAL at 08:02

## 2022-02-16 RX ADMIN — POLYETHYLENE GLYCOL 3350 17 G: 17 POWDER, FOR SOLUTION ORAL at 08:02

## 2022-02-16 RX ADMIN — PREGABALIN 50 MG: 50 CAPSULE ORAL at 08:02

## 2022-02-16 RX ADMIN — OXYCODONE 5 MG: 5 TABLET ORAL at 01:02

## 2022-02-16 RX ADMIN — OXYCODONE 5 MG: 5 TABLET ORAL at 08:02

## 2022-02-16 RX ADMIN — PREGABALIN 50 MG: 50 CAPSULE ORAL at 02:02

## 2022-02-16 RX ADMIN — VANCOMYCIN HYDROCHLORIDE 2000 MG: 10 INJECTION, POWDER, LYOPHILIZED, FOR SOLUTION INTRAVENOUS at 04:02

## 2022-02-16 RX ADMIN — OXYCODONE 5 MG: 5 TABLET ORAL at 09:02

## 2022-02-16 RX ADMIN — PANTOPRAZOLE SODIUM 40 MG: 20 TABLET, DELAYED RELEASE ORAL at 11:02

## 2022-02-16 RX ADMIN — VANCOMYCIN HYDROCHLORIDE 2000 MG: 10 INJECTION, POWDER, LYOPHILIZED, FOR SOLUTION INTRAVENOUS at 05:02

## 2022-02-16 RX ADMIN — CEFTRIAXONE SODIUM 2 G: 2 INJECTION, SOLUTION INTRAVENOUS at 02:02

## 2022-02-16 NOTE — ASSESSMENT & PLAN NOTE
Hx of epidural abscess 4/2021 with blood and surgical cultures positive for group B strep.  Echo 2/15 negative for endocarditis. ID following for antibiotic recs.     Recommendations:  - blood cultures NGTD  - biopsy gram stain negative, cultures and pathology pending   - urine tox screen positive for amphetamines, methadone; received opiates and benzos while admitted   - continue antibiotics per ID recs

## 2022-02-16 NOTE — CONSULTS
[facility])  Infectious Disease  Consult Note    Patient Name: Rachel Zazueta  MRN: [id number]  Admission Date: 2/14/2022  Hospital Length of Stay: 1 days  Attending Physician: Guille Templeton MD  Primary Care Provider: Dannielle Merino DO     Isolation Status: No active isolations    Patient information was obtained from patient and ER records.      Consults  Assessment/Plan:     Spondylodiscitis  40 yo female Hx IVDU in past, denies current use, with Hx of GBS infection, bacteremia, and epidural abscess L3-4 in 4/2021 s/p washout and fusion.  Completed 4 weeks of IV ceftriaxone then stopped after PICC accidentally removed and lost to FU. 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    - S/P IR disc aspiration L3-4 2/15/21 - gram stain negative  - Blood cultures ngtd  - Abx help prior to procedure  - stable non septic    Plan:  1. Start empiric vanc and ceftriaxone  2. FU cultures   3. FU NSGY recs  4. DIscussed with ID staff - will follow        Thank you for your consult. I will follow-up with patient. Please contact us if you have any additional questions.    ROSA ELENA Marinelli  Infectious Disease  [facility])    Subjective:     Principal Problem: <principal problem not specified>    HPI: Rachel Zazueta is a 41 y.o.  female with PMHx of Hepatitis C, liver cirrhosis, anemia, and polysubstance abuse, who presents to clinic for follow up with new imaging s/p L3 and L4 laminectomy for evacuation of epidural abscess and L3-L5 TLIF on 04/16/2021.  Cultures grew GBS and she was followed by ID and rec'd 8 week rocephin - EOC 6/11/21. She was lost to FU.  Per ID notes she was unable to be contacted at her contact numbers     She was seen Iin NSGY clinic and pt c/o worsening back and left leg pain since October. She stated that she is no longer using IV drugs. Stated only using marijuana. Described left leg pain as a shock down the leg when  standing for 10 minutes. Denied taking any antibiotics. Endorsed new weakness in the legs. Denied b/b dysfunction. Denied new neck pain.    She was admitted and underwent CT L spine showing: Postoperative changes from L3-5 instrumented fusion with bilateral vertical rods, pedicle screws, and metallic interbody spacers at each level.  Hardware is intact and in satisfactory position.  There are lucencies at the bone-metal interfaces of all pedicle screws, for example on 604:82, new from prior.  There are lucencies at the bone-metal interfaces of both interbody spacers as well.  No perihardware fractures.  Increased dextroscoliosis centered at L3.  Increased right lateral listhesis at L3-4 and L4-5.  New grade 1 retrolisthesis at L3-4.  Increased destructive endplate changes at L3-4.  Increased height loss of L3 and L4.  New destructive endplate changes at L4-5, for example on 605:107.  There is adjacent soft tissue edema.  Assessment for soft tissue and epidural collections is limited by CT technique and degraded by streak artifact.      MRI C/T/L done which confirmed the same in L spine and no other infectious findings in C and T spine    She was sent to IR for aspiration of L3-4.  ABX have been held.  BCXs NGTD.  Sed rate 57/CRP 29.  She denies IVDU and reportedly stopped IV abx, when last treated, after her  accidentally removed PICC.  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    Wasp sting [allergen ext-venom-honey bee] Anaphylaxis    Adhesive Blisters     "Strawberry Hives     Patient reports itching and hives    Iodine and iodide containing products Hives    Naproxen Other (See Comments)     Throat closing    Shellfish containing products     Nuts [tree nut] Rash       Medications:  Medications Prior to Admission   Medication Sig    diclofenac sodium (VOLTAREN) 1 % Gel Apply 2 g topically 4 (four) times daily.    polyethylene glycol (GLYCOLAX) 17 gram/dose powder Take 17 g by mouth once daily.    pregabalin (LYRICA) 50 MG capsule Take 1 capsule (50 mg total) by mouth 3 (three) times daily.    sertraline (ZOLOFT) 25 MG tablet Take 1 tablet (25 mg total) by mouth once daily.     Antibiotics (From admission, onward)            None        Antifungals (From admission, onward)            None        Antivirals (From admission, onward)    None           Immunization History   Administered Date(s) Administered    COVID-19, MRNA, LN-S, PF (Pfizer) (Purple Cap) 04/30/2021    Influenza - Quadrivalent - PF *Preferred* (6 months and older) 10/05/2018, 01/26/2022    Pneumococcal Conjugate - 13 Valent 10/05/2018    Tdap 08/19/2018       Family History     Problem Relation (Age of Onset)    Drug abuse Mother    Hepatitis Mother        Social History     Socioeconomic History    Marital status:    Tobacco Use    Smoking status: Current Some Day Smoker     Packs/day: 0.25     Years: 23.00     Pack years: 5.75     Types: Cigarettes    Smokeless tobacco: Never Used   Substance and Sexual Activity    Alcohol use: Not Currently    Drug use: Yes     Frequency: 4.0 times per week     Types: Methamphetamines, Marijuana     Comment: Paulette 1/month.  Meth- Injection, 1/w, injection in the right hand and wrist. Denies shared needles with other people, but occasional reuse at times. In addition endorses using a "fresh point" for sites of injection. Last use 3 days prior that was inhal    Sexual activity: Yes     Partners: Male     Birth control/protection: None "     Social Determinants of Health     Financial Resource Strain: Medium Risk    Difficulty of Paying Living Expenses: Somewhat hard   Food Insecurity: No Food Insecurity    Worried About Running Out of Food in the Last Year: Never true    Ran Out of Food in the Last Year: Never true   Transportation Needs: Unmet Transportation Needs    Lack of Transportation (Medical): Yes    Lack of Transportation (Non-Medical): Yes   Physical Activity: Inactive    Days of Exercise per Week: 0 days    Minutes of Exercise per Session: 0 min   Stress: Stress Concern Present    Feeling of Stress : Very much   Social Connections: Unknown    Frequency of Communication with Friends and Family: Once a week    Frequency of Social Gatherings with Friends and Family: Once a week    Active Member of Clubs or Organizations: No    Marital Status: Living with partner   Housing Stability: Low Risk     Unable to Pay for Housing in the Last Year: No    Number of Places Lived in the Last Year: 2    Unstable Housing in the Last Year: No     Review of Systems   Constitutional: Negative for appetite change, chills, diaphoresis, fatigue, fever and unexpected weight change.   HENT: Negative for congestion, ear pain, hearing loss, sore throat and tinnitus.    Eyes: Negative for pain, redness and visual disturbance.   Respiratory: Negative for cough, chest tightness, shortness of breath and wheezing.    Cardiovascular: Negative for chest pain.   Gastrointestinal: Negative for abdominal pain, constipation, diarrhea, nausea and vomiting.   Endocrine: Negative for cold intolerance and heat intolerance.   Genitourinary: Negative for decreased urine volume, difficulty urinating, dysuria, flank pain, frequency, hematuria and urgency.   Musculoskeletal: Positive for back pain and myalgias. Negative for arthralgias and neck pain.   Skin: Negative for rash and wound.   Allergic/Immunologic: Negative for environmental allergies, food allergies and  immunocompromised state.   Neurological: Negative for dizziness, facial asymmetry, weakness, light-headedness, numbness and headaches.   Hematological: Negative for adenopathy. Does not bruise/bleed easily.   Psychiatric/Behavioral: Negative for agitation, behavioral problems and confusion.     Objective:     Vital Signs (Most Recent):  Temp: 98.7 °F (37.1 °C) (02/15/22 0800)  Pulse: 97 (02/15/22 0933)  Resp: 16 (02/15/22 0905)  BP: (!) 106/48 (02/15/22 0933)  SpO2: 97 % (02/15/22 0800) Vital Signs (24h Range):  Temp:  [98.3 °F (36.8 °C)-99.3 °F (37.4 °C)] 98.7 °F (37.1 °C)  Pulse:  [] 97  Resp:  [16-20] 16  SpO2:  [97 %-100 %] 97 %  BP: (103-133)/(48-80) 106/48     Weight: 131.5 kg (290 lb)  Body mass index is 45.42 kg/m².    Estimated Creatinine Clearance: 209.4 mL/min (based on SCr of 0.5 mg/dL).    Physical Exam  Constitutional:       General: She is not in acute distress.     Appearance: Normal appearance. She is well-developed and well-nourished. She is not diaphoretic.   HENT:      Head: Normocephalic and atraumatic.   Cardiovascular:      Rate and Rhythm: Normal rate and regular rhythm.      Heart sounds: Normal heart sounds. No murmur heard.  No friction rub. No gallop.    Pulmonary:      Effort: Pulmonary effort is normal. No respiratory distress.      Breath sounds: Normal breath sounds. No wheezing or rales.   Abdominal:      General: Bowel sounds are normal. There is no distension.      Palpations: Abdomen is soft. There is no mass.      Tenderness: There is no abdominal tenderness. There is no guarding or rebound.   Musculoskeletal:         General: No edema.   Skin:     General: Skin is warm and dry.   Neurological:      Mental Status: She is alert and oriented to person, place, and time.   Psychiatric:         Mood and Affect: Mood and affect normal.         Behavior: Behavior normal.         Significant Labs:   Blood Culture:   Recent Labs   Lab 02/14/22  1820   LABBLOO No Growth to date  No  Growth to date     CBC:   Recent Labs   Lab 02/14/22 1819 02/15/22  0501   WBC 5.97 4.91   HGB 10.0* 9.8*   HCT 32.4* 31.6*   PLT 59* 57*     CMP:   Recent Labs   Lab 02/14/22  1819 02/15/22  0501   * 132*   K 4.3 4.1    102   CO2 27 25    75   BUN 16 12   CREATININE 0.7 0.5   CALCIUM 8.5* 8.0*   PROT 8.6*  --    ALBUMIN 2.5*  --    BILITOT 2.1*  --    ALKPHOS 130  --    AST 40  --    ALT 14  --    ANIONGAP 4* 5*   EGFRNONAA >60.0 >60.0     Wound Culture: No results for input(s): LABAERO in the last 4320 hours.  All pertinent labs within the past 24 hours have been reviewed.    Significant Imaging: I have reviewed all pertinent imaging results/findings within the past 24 hours.   MRI Spine Cervical-Thoracic-Lumbar W W/O Contrast (XPD) [[id number]] Resulted: 02/14/22 2334   Order Status: Completed Updated: 02/14/22 2336   Narrative:     EXAMINATION:   MRI SPINE CERVICAL-THORACIC-LUMBAR W W/O CONTRAST (XPD)     CLINICAL HISTORY:   Numbness or tingling, paresthesia (Ped 0-18y);infection;     TECHNIQUE:   Multiplanar MRI of the cervical spine, thoracic spine and lumbar spine without contrast.  Dose given is 10 cc Gadavist intravenous contrast.     COMPARISON:   Correlation with CT lumbar spine 02/14/2022     FINDINGS:   Cervical spine:     Cervical spinal cord is normal in course and caliber.  No evidence of edema or myelomalacia.  Cervicomedullary junction appears adequately maintained.     Mild straightening of normal cervical lordosis.     No evidence of vertebral body acute fracture or marrow replacement.     Moderate disc space narrowing throughout the cervical spine.  Endplate degenerative changes at C5-6 and C6-7, most prominent at the inferior endplate of C6.     Minimal disc bulge at C2-3.  Mild diffuse posterior disc osteophyte complex at C3-4, C4-5, C5-6, C6-7 and C7-T1.     No significant central canal or foraminal narrowing.     No paraspinal mass or fluid collection.     No evidence of  epidural hematoma or mass.     Thoracic spine:     Thoracic spinal cord is normal in course and caliber.  No evidence of edema or myelomalacia.     Thoracic vertebral bodies appear adequately maintained.  No evidence of fracture, edema or marrow replacement.     No significant disc bulge or protrusion in the thoracic spine.     Central canal appears adequately maintained.  Probable mild foraminal narrowing in the mid thoracic spine.     Slight motion artifact obscures visualization.     Lumbar spine:     Distal spinal cord ends normally at approximately the L1 level.     Posterior surgical fusion hardware from L3 through L5.  Pedicle screws at L3, L4 and L5 on the right and L3 and L5 on the left.  Posterior rods bilaterally.     Interbody spacers at L3-4 and L4-5.     Significant metallic artifact.     Soft tissue edema noted posteriorly.  There is loss of height of the L4 and L3 vertebral bodies.  This is better visualized on prior CT.     Discitis/osteomyelitis of L3-4 and to lesser degree L4-5 better visualized on prior CT.  Ample metallic artifact obscures these levels.     Epidural component is not completely excluded at the surgical levels.    Impression:       Suspected discitis/osteomyelitis of the lumbar spine at L3-4 and L4-5 with postoperative changes of posterior fusion from L3 through L5 with probable hardware infection, better visualized on prior CT.  Neurosurgical follow-up recommended.       Electronically signed by: José Ward   Date: 02/14/2022   Time: 23:34   MRI Brain W WO Contrast [[id number]] Resulted: 02/14/22 2259   Order Status: Completed Updated: 02/14/22 2301   Narrative:     EXAMINATION:   MRI BRAIN W WO CONTRAST     CLINICAL HISTORY:   Numbness or tingling, paresthesia (Ped 0-18y);Craniotomy, post-op;.     TECHNIQUE:   Multiplanar multisequence MR imaging of the brain was performed before and after the administration of  mL Gadavist  intravenous contrast.     COMPARISON:   None.      FINDINGS:   Intracranial compartment:     Ventricles and sulci are normal in size for age without evidence of hydrocephalus. No extra-axial blood or fluid collections.     Patchy nonspecific increased T2 and FLAIR signal in the periventricular and subcortical white matter bilaterally could be associated with chronic microvascular ischemic changes slightly greater than expected for the patient's chronologic age.  Demyelinating process such as multiple sclerosis could appear similar.  No evidence of active demyelination.  No diffusion positive areas and no pathologic enhancement on post-contrast imaging.     No mass lesion, acute hemorrhage, edema or acute infarct. No abnormal enhancement.     Normal vascular flow voids are preserved.     Skull/extracranial contents (limited evaluation): Bone marrow signal intensity is normal.    Impression:       1. No acute intracranial process.   2. Patchy nonspecific increased T2 and FLAIR signal in the periventricular and subcortical white matter bilaterally could be associated with chronic microvascular ischemic changes, slightly greater than expected for the patient's chronologic age.  A demyelinating process such as multiple sclerosis could appear similar.  No evidence of active demyelination.  Recommend clinical correlation.       Electronically signed by: José Ward   Date: 02/14/2022   Time: 22:59       Imaging History    2022  Date Procedure Name Status Accession Number Location   02/14/22 10:43 PM MRI Spine Cervical-Thoracic-Lumbar W W/O Contrast (XPD) Final [id number] [facility]   02/14/22 10:42 PM MRI Brain W WO Contrast Final [id number] [facility]   02/14/22 01:11 PM CT Lumbar Spine Without Contrast Final [id number] [facility]   02/15/22 10:00 AM Echo Final [id number] [facility]

## 2022-02-16 NOTE — ASSESSMENT & PLAN NOTE
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 Pt denies ever receiving treatment for HCV. Pt reports previous liver biopsy, but no records of this available. She has been referred to hepatology previously but did not follow up.   MELD-Na score: 11 at 2/16/2022  6:40 AM  MELD score: 11 at 2/16/2022  6:40 AM  Calculated from:  Serum Creatinine: 0.6 mg/dL (Using min of 1 mg/dL) at 2/16/2022  6:40 AM  Serum Sodium: 133 mmol/L at 2/16/2022  6:40 AM  Total Bilirubin: 1.4 mg/dL at 2/16/2022  6:40 AM  INR(ratio): 1.3 at 2/16/2022  6:40 AM  Age: 41 years    Recommendations:  - U/S abdomen with doppler showed cirrhosis and portal hypertension satisfactory Doppler evaluation   - daily CMP and INR  - will need hepatology referral on discharge   - check lipase given RUQ/epigastric pain

## 2022-02-16 NOTE — SUBJECTIVE & OBJECTIVE
Interval History: Patient seen at bedside      Review of Systems   Constitutional: Negative for chills, diaphoresis and fever.   HENT: Negative.    Eyes: Negative for pain, redness and visual disturbance.   Respiratory: Negative for cough, chest tightness and shortness of breath.    Cardiovascular: Negative for chest pain and leg swelling.   Gastrointestinal: Positive for constipation. Negative for abdominal pain, diarrhea, nausea and vomiting.   Genitourinary: Negative for difficulty urinating, dysuria and hematuria.   Musculoskeletal: Positive for arthralgias and back pain. Negative for neck pain.   Skin: Negative for color change and wound.   Neurological: Negative for dizziness, speech difficulty and headaches.   Hematological: Bruises/bleeds easily.   Psychiatric/Behavioral: Negative for agitation, confusion and dysphoric mood.     Objective:     Vital Signs (Most Recent):  Temp: 98.1 °F (36.7 °C) (02/16/22 0813)  Pulse: 88 (02/16/22 1129)  Resp: 18 (02/16/22 1129)  BP: (!) 122/59 (02/16/22 1129)  SpO2: 97 % (02/16/22 1129) Vital Signs (24h Range):  Temp:  [97.2 °F (36.2 °C)-99.5 °F (37.5 °C)] 98.1 °F (36.7 °C)  Pulse:  [84-97] 88  Resp:  [14-18] 18  SpO2:  [94 %-100 %] 97 %  BP: (106-136)/(53-88) 122/59     Weight: 131.5 kg (290 lb)  Body mass index is 45.42 kg/m².    Estimated Creatinine Clearance: 174.5 mL/min (based on SCr of 0.6 mg/dL).    Physical Exam  Vitals reviewed.   Constitutional:       General: She is not in acute distress.     Appearance: Normal appearance. She is not ill-appearing, toxic-appearing or diaphoretic.   HENT:      Head: Normocephalic and atraumatic.      Nose: Nose normal. No congestion.   Eyes:      General: No scleral icterus.     Conjunctiva/sclera: Conjunctivae normal.   Cardiovascular:      Rate and Rhythm: Normal rate and regular rhythm.   Pulmonary:      Effort: Pulmonary effort is normal. No respiratory distress.      Breath sounds: No wheezing.   Abdominal:      General:  There is no distension.      Palpations: Abdomen is soft.      Tenderness: There is no abdominal tenderness.   Musculoskeletal:      Right lower leg: No edema.      Left lower leg: No edema.      Comments: Prior surgical incision well healed   Skin:     General: Skin is warm and dry.      Findings: Bruising present.   Neurological:      Mental Status: She is alert and oriented to person, place, and time.   Psychiatric:         Mood and Affect: Mood normal.         Behavior: Behavior normal.         Thought Content: Thought content normal.         Judgment: Judgment normal.         Significant Labs:   Bilirubin:   Recent Labs   Lab 02/07/22  1647 02/14/22  1819 02/16/22  0640   BILITOT 1.2* 2.1* 1.4*     Blood Culture:   Recent Labs   Lab 02/14/22  1820   LABBLOO No Growth to date  No Growth to date  No Growth to date  No Growth to date     Bone Marrow Culture: No results for input(s): BONEMARROWCU in the last 4320 hours.  CBC:   Recent Labs   Lab 02/14/22  1819 02/15/22  0501 02/16/22  0640   WBC 5.97 4.91 2.81*   HGB 10.0* 9.8* 9.7*   HCT 32.4* 31.6* 31.0*   PLT 59* 57* 61*     CMP:   Recent Labs   Lab 02/14/22  1819 02/15/22  0501 02/16/22  0640   * 132* 133*   K 4.3 4.1 3.7    102 104   CO2 27 25 23    75 130*   BUN 16 12 10   CREATININE 0.7 0.5 0.6   CALCIUM 8.5* 8.0* 8.0*   PROT 8.6*  --  7.3   ALBUMIN 2.5*  --  2.1*   BILITOT 2.1*  --  1.4*   ALKPHOS 130  --  111   AST 40  --  35   ALT 14  --  11   ANIONGAP 4* 5* 6*   EGFRNONAA >60.0 >60.0 >60.0     Fungus Culture (Blood or Bone Marrow): No results for input(s): FUNGUSCULTUR in the last 4320 hours.  Genital Culture: No results for input(s): LABGENI in the last 4320 hours.  Hepatitis Panel: No results for input(s): HEPBSAG, HEPAIGM, HEPCAB in the last 48 hours.    Invalid input(s): HAPBIGM  HIV Rapid: No results for input(s): HIVRAPID in the last 48 hours.  Lactic Acid:   Recent Labs   Lab 02/14/22  1819   LACTATE 1.1     Lipase: No  results for input(s): LIPASE in the last 48 hours.  Microbiology Results (last 7 days)     Procedure Component Value Units Date/Time    Aerobic culture [[id number]] Collected: 02/15/22 1128    Order Status: Completed Specimen: Biopsy from Back Updated: 02/16/22 0755     Aerobic Bacterial Culture No growth    Narrative:      L3-4 / L4-5 osteodiscitis    Culture, Anaerobic [[id number]] Collected: 02/15/22 1128    Order Status: Completed Specimen: Biopsy from Back Updated: 02/16/22 0647     Anaerobic Culture Culture in progress    Narrative:      L3-4 / L4-5 osteodiscitis    Blood Culture #2 **CANNOT BE ORDERED STAT** [[id number]] Collected: 02/14/22 1820    Order Status: Completed Specimen: Blood from Peripheral, Antecubital, Right Updated: 02/15/22 2012     Blood Culture, Routine No Growth to date      No Growth to date    Blood Culture #1 **CANNOT BE ORDERED STAT** [[id number]] Collected: 02/14/22 1820    Order Status: Completed Specimen: Blood from Peripheral, Forearm, Right Updated: 02/15/22 2012     Blood Culture, Routine No Growth to date      No Growth to date    Gram stain [[id number]] Collected: 02/15/22 1128    Order Status: Completed Specimen: Biopsy from Back Updated: 02/15/22 1815     Gram Stain Result Rare WBC's      No organisms seen    Narrative:      L3-4 / L4-5 osteodiscitis    AFB Culture & Smear [[id number]] Collected: 02/15/22 1128    Order Status: Sent Specimen: Biopsy from Back Updated: 02/15/22 1415    Fungus culture [[id number]] Collected: 02/15/22 1128    Order Status: Sent Specimen: Biopsy from Back Updated: 02/15/22 1414    Blood culture [[id number]] Collected: 02/14/22 2033    Order Status: Sent Specimen: Blood from Peripheral, Hand, Left Updated: 02/14/22 2034    Blood culture [[id number]] Collected: 02/14/22 2033    Order Status: Sent Specimen: Blood from Peripheral, Antecubital, Left Updated: 02/14/22 2034        POCT Glucose: No results for input(s): POCTGLUCOSE in the last 48  hours.  Prealbumin: No results for input(s): PREALBUMIN in the last 48 hours.  Procalcitonin:   Recent Labs   Lab 02/14/22  1819 02/14/22 2032   PROCAL 0.06 0.05     Quantiferon: No results for input(s): NIL, TBAG, TBAGNIL, MITOGENNIL, TBGOLD in the last 48 hours.  Respiratory Culture: No results for input(s): GSRESP, RESPIRATORYC in the last 4320 hours.  Urine Culture: No results for input(s): LABURIN in the last 4320 hours.  Urine Studies: No results for input(s): COLORU, APPEARANCEUA, PHUR, SPECGRAV, PROTEINUA, GLUCUA, KETONESU, BILIRUBINUA, OCCULTUA, NITRITE, UROBILINOGEN, LEUKOCYTESUR, RBCUA, WBCUA, BACTERIA, SQUAMEPITHEL, HYALINECASTS in the last 4320 hours.    Invalid input(s): WRIGHTSUR  Wound Culture:   Recent Labs   Lab 02/15/22  1128   LABAERO No growth     All pertinent labs within the past 24 hours have been reviewed.  Recent Lab Results       02/16/22  0640        Albumin 2.1       Alkaline Phosphatase 111       ALT 11       Anion Gap 6       AST 35       Baso # 0.01       Basophil % 0.4       BILIRUBIN TOTAL 1.4  Comment: For infants and newborns, interpretation of results should be based  on gestational age, weight and in agreement with clinical  observations.    Premature Infant recommended reference ranges:  Up to 24 hours.............<8.0 mg/dL  Up to 48 hours............<12.0 mg/dL  3-5 days..................<15.0 mg/dL  6-29 days.................<15.0 mg/dL         BUN 10       Calcium 8.0       Chloride 104       CO2 23       Creatinine 0.6       Differential Method Automated       eGFR if  >60.0       eGFR if non  >60.0  Comment: Calculation used to obtain the estimated glomerular filtration  rate (eGFR) is the CKD-EPI equation.          Eos # 0.1       Eosinophil % 1.8       Glucose 130       Gran # (ANC) 1.9       Gran % 68.6       Hematocrit 31.0       Hemoglobin 9.7       Immature Grans (Abs) 0.02  Comment: Mild elevation in immature granulocytes is non  specific and   can be seen in a variety of conditions including stress response,   acute inflammation, trauma and pregnancy. Correlation with other   laboratory and clinical findings is essential.         Immature Granulocytes 0.7       INR 1.3  Comment: Coumadin Therapy:  2.0 - 3.0 for INR for all indicators except mechanical heart valves  and antiphospholipid syndromes which should use 2.5 - 3.5.         Lymph # 0.5       Lymph % 17.8       MCH 27.9       MCHC 31.3       MCV 89       Mono # 0.3       Mono % 10.7       MPV 9.7       nRBC 0       Platelets 61       Potassium 3.7       PROTEIN TOTAL 7.3       Protime 13.2       RBC 3.48       RDW 18.3       Sodium 133       WBC 2.81             Significant Imaging:     X-Ray Lumbar Complete Including Flex And Ext [[id number]] Resulted: 02/16/22 0752   Order Status: Completed Updated: 02/16/22 0754   Narrative:     EXAMINATION:   XR LUMBAR SPINE 5 VIEW WITH FLEX AND EXT     CLINICAL HISTORY:   evaluate spinal instability; L3-4, L4-5 TLIF with hardware failure in setting of spondylodiscitis;     TECHNIQUE:   Five views of the lumbar spine plus flexion extension views were performed.     COMPARISON:   Lumbar spine exam December 31, 2021, CT of lumbar spine February 14, 2022     FINDINGS:   Stable 35 degree or so dextro rotary scoliosis of lower thoracic and lumbar spine.  No acute fractures with preserved vertebral body heights.  In reconfirmed findings of bilateral posterior hardware instrumented fusion in of L3, L4, and L5 vertebral segments with inter body spacers.  As was better seen on earlier CT, there is paralleling lucency  at the bone metal interfaces of all pedicle screws of concern for hardware loosening.  No fragmented hardware.  The CT demonstrated increased destructive endplate changes at L3-L4 and new destructive endplate changes at L4-L5 concerning for discitis-osteomyelitis reconfirmed on the standard images but better defined on CT.  Grade 1  anterolisthesis of L4 with respect to L3 and L5.  No definite instability based on flexion and extension views.  Flexion and extension views demonstrate no definite instability.    Impression:       As above.       Electronically signed by: Brandan Aguilar   Date: 02/16/2022   Time: 07:52   US Abdomen Limited with Doppler (xpd) [[id number]] Resulted: 02/15/22 1859   Order Status: Completed Updated: 02/15/22 1902   Narrative:     EXAMINATION:   US ABDOMEN LIMITED WITH DOPPLER (XPD)     CLINICAL HISTORY:   cirrhosis;     TECHNIQUE:   Complete abdominal ultrasound with doppler.  Color and spectral Doppler were performed.     COMPARISON:   CT abdomen pelvis 07/27/2021.     FINDINGS:   The visualized portion of the pancreas demonstrates 1.6 x 0.7 x 1.1 cm peripancreatic lymph node.     The liver is normal in size measuring 13 cm.  The liver demonstrates heterogeneous echotexture no focal hepatic lesions are seen.     The gallbladder is surgically absent.  The common duct is not dilated, measuring 3 mm.  No dilated intrahepatic radicles are seen.     The spleen is enlarged in size measuring 20.6 x 10.7 cm with a homogeneous echotexture.     The aorta tapers normally.     The kidneys are normal in size without focal abnormality or evidence of hydronephrosis.     There is no evidence of ascites.     The main portal vein, right portal vein, left portal vein, middle hepatic vein, right hepatic vein, left hepatic vein, SMV, and IVC are patent with proper directional flow.  The main hepatic artery is patent. The umbilical vein is patent.    Impression:       Hepatic cirrhosis and portal hypertension with satisfactory Doppler evaluation of the liver.     Enlarged peripancreatic lymph node.     Electronically signed by resident: Nuha Grubbs   Date: 02/15/2022   Time: 18:27     Electronically signed by: Yovany Beatty MD   Date: 02/15/2022   Time: 18:59   IR Biopsy Bone deep [[id number]] Resulted: 02/15/22 1322   Order Status:  Completed Updated: 02/15/22 1325   Narrative:     EXAMINATION:   IR BIOPSY BONE/DISC DEEP     CLINICAL HISTORY:   L3-4 / L4-5 osteodiscitis;     41 y.o. female with medical history of Hepatitis C, liver cirrhosis, polysubstance abuse, L3 and L4 laminectomy - with admission concerns for back pain. MRI spine revealing discitis/osteomyelitis of the lumbar spine at L3-4 and L4-5. Hence plans for fluroscopy guided L3-4 intervertebral disc biopsy.     TECHNIQUE:   Informed consent was obtained.  Biplane fluoroscopy was used.  Conscious intravenous sedation was provided by an independent radiology nurse who used continuous physiologic monitoring, and the time of the sedation was 20 minutes.     Using usual sterile technique, lidocaine local anesthesia and biplane fluoroscopy guidance, a biopsy procedure was performed via right posterolateral, para pedicular approach.  [employer] VT Bone Biopsy Device 13Ga was used to obtain samples from the L3-L4 intervertebral disc and L3 inferior endplate.     Samples were sent to the lab.  There were no complications.  The patient tolerated the procedure well and was transferred to the recovery room after the biopsy procedure.  Images and report were permanently recorded.  Fluoroscopy dose was 2634.2 uGycm2.  Reference air kerma was 228.3mGy.  Fluoroscopy time was 2.7 minutes     COMPARISON:   None    Impression:       Successful fluoroscopic guided biopsy of the L3-L4 intervertebral disc and L3 inferior endplate.     Electronically signed by resident: Tomy Frey   Date: 02/15/2022   Time: 12:48     Electronically signed by: Guille Gimenez MD   Date: 02/15/2022   Time: 13:22   MRI Spine Cervical-Thoracic-Lumbar W W/O Contrast (XPD) [[id number]] Resulted: 02/14/22 2334   Order Status: Completed Updated: 02/14/22 2336   Narrative:     EXAMINATION:   MRI SPINE CERVICAL-THORACIC-LUMBAR W W/O CONTRAST (XPD)     CLINICAL HISTORY:   Numbness or tingling, paresthesia (Ped 0-18y);infection;      TECHNIQUE:   Multiplanar MRI of the cervical spine, thoracic spine and lumbar spine without contrast.  Dose given is 10 cc Gadavist intravenous contrast.     COMPARISON:   Correlation with CT lumbar spine 02/14/2022     FINDINGS:   Cervical spine:     Cervical spinal cord is normal in course and caliber.  No evidence of edema or myelomalacia.  Cervicomedullary junction appears adequately maintained.     Mild straightening of normal cervical lordosis.     No evidence of vertebral body acute fracture or marrow replacement.     Moderate disc space narrowing throughout the cervical spine.  Endplate degenerative changes at C5-6 and C6-7, most prominent at the inferior endplate of C6.     Minimal disc bulge at C2-3.  Mild diffuse posterior disc osteophyte complex at C3-4, C4-5, C5-6, C6-7 and C7-T1.     No significant central canal or foraminal narrowing.     No paraspinal mass or fluid collection.     No evidence of epidural hematoma or mass.     Thoracic spine:     Thoracic spinal cord is normal in course and caliber.  No evidence of edema or myelomalacia.     Thoracic vertebral bodies appear adequately maintained.  No evidence of fracture, edema or marrow replacement.     No significant disc bulge or protrusion in the thoracic spine.     Central canal appears adequately maintained.  Probable mild foraminal narrowing in the mid thoracic spine.     Slight motion artifact obscures visualization.     Lumbar spine:     Distal spinal cord ends normally at approximately the L1 level.     Posterior surgical fusion hardware from L3 through L5.  Pedicle screws at L3, L4 and L5 on the right and L3 and L5 on the left.  Posterior rods bilaterally.     Interbody spacers at L3-4 and L4-5.     Significant metallic artifact.     Soft tissue edema noted posteriorly.  There is loss of height of the L4 and L3 vertebral bodies.  This is better visualized on prior CT.     Discitis/osteomyelitis of L3-4 and to lesser degree L4-5 better  visualized on prior CT.  Ample metallic artifact obscures these levels.     Epidural component is not completely excluded at the surgical levels.    Impression:       Suspected discitis/osteomyelitis of the lumbar spine at L3-4 and L4-5 with postoperative changes of posterior fusion from L3 through L5 with probable hardware infection, better visualized on prior CT.  Neurosurgical follow-up recommended.       Electronically signed by: José Ward   Date: 02/14/2022   Time: 23:34   MRI Brain W WO Contrast [[id number]] Resulted: 02/14/22 2259   Order Status: Completed Updated: 02/14/22 2301   Narrative:     EXAMINATION:   MRI BRAIN W WO CONTRAST     CLINICAL HISTORY:   Numbness or tingling, paresthesia (Ped 0-18y);Craniotomy, post-op;.     TECHNIQUE:   Multiplanar multisequence MR imaging of the brain was performed before and after the administration of  mL Gadavist  intravenous contrast.     COMPARISON:   None.     FINDINGS:   Intracranial compartment:     Ventricles and sulci are normal in size for age without evidence of hydrocephalus. No extra-axial blood or fluid collections.     Patchy nonspecific increased T2 and FLAIR signal in the periventricular and subcortical white matter bilaterally could be associated with chronic microvascular ischemic changes slightly greater than expected for the patient's chronologic age.  Demyelinating process such as multiple sclerosis could appear similar.  No evidence of active demyelination.  No diffusion positive areas and no pathologic enhancement on post-contrast imaging.     No mass lesion, acute hemorrhage, edema or acute infarct. No abnormal enhancement.     Normal vascular flow voids are preserved.     Skull/extracranial contents (limited evaluation): Bone marrow signal intensity is normal.    Impression:       1. No acute intracranial process.   2. Patchy nonspecific increased T2 and FLAIR signal in the periventricular and subcortical white matter bilaterally could be  associated with chronic microvascular ischemic changes, slightly greater than expected for the patient's chronologic age.  A demyelinating process such as multiple sclerosis could appear similar.  No evidence of active demyelination.  Recommend clinical correlation.       Electronically signed by: José Ward   Date: 02/14/2022   Time: 22:59       I personally reviewed records today as well as relevant labs, cultures, and imaging. This includes old hospital and clinic records.

## 2022-02-16 NOTE — ASSESSMENT & PLAN NOTE
40 y/o F with hx of epidural abscess s/p L3 and L4 laminectomy and L3-L5 TLIF (4/2021) admitted from [facility] with worsening lumbar back pain and LE weakness. MRI C/T/L spine showed suspected discitis/osteomyelitis at L3-4 and L4-5 with probable hardware infection. IR consulted for biopsy. TTE 2/15 with EF 60%. EKG with NS.    Surgical Risk Assessment    Active cardiac issues:  Active decompensated heart failure? No   Unstable angina?  No   Significant uncontrolled arrhythmias? No   Severe valvular heart disease-Aortic or Mitral Stenosis? No   Recent MI or coronary revascularization < 30 days? No     Cardiac Risk Factors  History of CAD/ischemic heart disease? No   History of cerebrovascular disease? No   History of compensated heart failure? No   Type 2 diabetes requiring insulin? No   Serum Creatinine > 2? No   Total cardiac risk factors 0     Functional mets limited by pain, able to walk with walker.    < 4* METs -unable to walk > 2 blocks on level ground without stopping due to symptoms  - eating, dressing, toileting, walking indoors, light housework. POOR   > 4* METs -climbing > 1 flight of stairs without stopping  -walking up hill > 1-2 blocks  -scrubbing floors  -moving furniture  - golf, bowling, dancing or tennis  -running short distance MODERATE to EXCELLENT   * performance of any one of the activities       Cardiovascular Risk Assessment:  Non-emergent surgery.  No active cardiac problems (such as unstable angina, decompensated heart failure, significant uncontrolled arrhythmias or severe valvular disease).  Intermediate risk surgery.  Functional Status: limited by pain  Her revised cardiac risk index is 0 (3.9% 30-day risk of death, MI, or cardiac arrest).       Recommendation:  - Proceed to Surgery  - Will continue to follow

## 2022-02-16 NOTE — PROGRESS NOTES
[facility] - Neurosurgery ([facility])  Infectious Disease  Progress Note    Patient Name: Rachel Zazueta  MRN: [id number]  Admission Date: 2/14/2022  Length of Stay: 2 days  Attending Physician: Guille Templeton MD  Primary Care Provider: Dannielle Merino DO    Isolation Status: No active isolations  Assessment/Plan:     Chronic hepatitis C with cirrhosis     History of HCV. Needs Hepatology follow up after discharge for further evaluation and treatment.    Spondylodiscitis  \   41 year old female with remote hx of IVDU, GBS bacteremia, L3-4 osteodiscitis & epidural abscess s/p washout and fusion 4/2021 who presented to NSGY clinic with worsening back pain since October and admitted after imaging showed worsened L3-4 osteodiscitis with extension to L4-5 along with hardware loosening.    Patient underwent L3-4 intervertebral disc biopsy on 2/15. She is scheduled for surgical intervention on Monday.  Patient started on empiric Vancomycin and Ceftriaxone. IR cultures and blood cx are NGTD. Afebrile. HDS.    Recommendations  · Continue empiric Vancomycin and Ceftriaxone  · Inpatient pharmacy managing Vanc dosing. Vanc trough goal 15-20 (trough due this a.m.)  · Follow IR cultures to guide antibiotics   · When taken to the OR, please send tissue/bone for pathology, gram stain, aerobic, anaerobic, AFB and fungal cultures   · Anticipate 8 weeks of IV antibiotics from day of surgery  · Discussed antibiotic plan with ID staff. ID will follow            Please call  or secure chat for any questions. Thank you.  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  She was treated  with Ceftriaxone with a plan to complete 8 weeks of antibiotics (end date 6/11/21). Initially at [facility] and  discharged from Rehab with a PICC line to finish antibiotics at home. Patient reports only completing 4-5 weeks of IV antibiotics as her PICC line accidentally fell at at home. She did not inform ID or go to the ED for replacement as her back pain had resolved and she felt well. She missed her last ID appt.    In October her back pain returned and radiates down her left leg. She was seen in NSGY 2/14 for this.  CT ordered reviewed and showed increased destructive endplate changes at L3-4, new destructive endplate changes at L4-5, and worsening alignment abnormalities, with adjacent soft tissue edema and lymphadenopathy, highly concerning for discitis osteomyelitis. New lucencies at the bone-metal interfaces of all pedicle screws and both interbody spacers, concerning for loosening. Patient was admitted for further evaluation and treatment.    Patient afebrile without a leukocytosis. Sed rate 57/CRP 29. She stated that she is no longer using IV drugs and only using marijuana.  Denies recent abx use.    MRI C/T/L spine - Suspected discitis/osteomyelitis of the lumbar spine at L3-4 and L4-5 with postoperative changes of posterior fusion from L3 through L5 with probable hardware infection.  IR consulted and performed L3-4 intervertebral disc biopsy. Cx are NGTD.  ID consulted to abx recs       Interval History: Patient seen at bedside      Review of Systems   Constitutional: Negative for chills, diaphoresis and fever.   HENT: Negative.    Eyes: Negative for pain, redness and visual disturbance.   Respiratory: Negative for cough, chest tightness and shortness of breath.    Cardiovascular: Negative for chest pain and leg swelling.   Gastrointestinal: Positive for constipation. Negative for abdominal pain, diarrhea, nausea and vomiting.   Genitourinary: Negative for difficulty urinating, dysuria and  hematuria.   Musculoskeletal: Positive for arthralgias and back pain. Negative for neck pain.   Skin: Negative for color change and wound.   Neurological: Negative for dizziness, speech difficulty and headaches.   Hematological: Bruises/bleeds easily.   Psychiatric/Behavioral: Negative for agitation, confusion and dysphoric mood.     Objective:     Vital Signs (Most Recent):  Temp: 98.1 °F (36.7 °C) (02/16/22 0813)  Pulse: 88 (02/16/22 1129)  Resp: 18 (02/16/22 1129)  BP: (!) 122/59 (02/16/22 1129)  SpO2: 97 % (02/16/22 1129) Vital Signs (24h Range):  Temp:  [97.2 °F (36.2 °C)-99.5 °F (37.5 °C)] 98.1 °F (36.7 °C)  Pulse:  [84-97] 88  Resp:  [14-18] 18  SpO2:  [94 %-100 %] 97 %  BP: (106-136)/(53-88) 122/59     Weight: 131.5 kg (290 lb)  Body mass index is 45.42 kg/m².    Estimated Creatinine Clearance: 174.5 mL/min (based on SCr of 0.6 mg/dL).    Physical Exam  Vitals reviewed.   Constitutional:       General: She is not in acute distress.     Appearance: Normal appearance. She is not ill-appearing, toxic-appearing or diaphoretic.   HENT:      Head: Normocephalic and atraumatic.      Nose: Nose normal. No congestion.   Eyes:      General: No scleral icterus.     Conjunctiva/sclera: Conjunctivae normal.   Cardiovascular:      Rate and Rhythm: Normal rate and regular rhythm.   Pulmonary:      Effort: Pulmonary effort is normal. No respiratory distress.      Breath sounds: No wheezing.   Abdominal:      General: There is no distension.      Palpations: Abdomen is soft.      Tenderness: There is no abdominal tenderness.   Musculoskeletal:      Right lower leg: No edema.      Left lower leg: No edema.      Comments: Prior surgical incision well healed   Skin:     General: Skin is warm and dry.      Findings: Bruising present.   Neurological:      Mental Status: She is alert and oriented to person, place, and time.   Psychiatric:         Mood and Affect: Mood normal.         Behavior: Behavior normal.         Thought  Content: Thought content normal.         Judgment: Judgment normal.         Significant Labs:   Bilirubin:   Recent Labs   Lab 02/07/22  1647 02/14/22  1819 02/16/22  0640   BILITOT 1.2* 2.1* 1.4*     Blood Culture:   Recent Labs   Lab 02/14/22  1820   LABBLOO No Growth to date  No Growth to date  No Growth to date  No Growth to date     Bone Marrow Culture: No results for input(s): BONEMARROWCU in the last 4320 hours.  CBC:   Recent Labs   Lab 02/14/22  1819 02/15/22  0501 02/16/22  0640   WBC 5.97 4.91 2.81*   HGB 10.0* 9.8* 9.7*   HCT 32.4* 31.6* 31.0*   PLT 59* 57* 61*     CMP:   Recent Labs   Lab 02/14/22  1819 02/15/22  0501 02/16/22  0640   * 132* 133*   K 4.3 4.1 3.7    102 104   CO2 27 25 23    75 130*   BUN 16 12 10   CREATININE 0.7 0.5 0.6   CALCIUM 8.5* 8.0* 8.0*   PROT 8.6*  --  7.3   ALBUMIN 2.5*  --  2.1*   BILITOT 2.1*  --  1.4*   ALKPHOS 130  --  111   AST 40  --  35   ALT 14  --  11   ANIONGAP 4* 5* 6*   EGFRNONAA >60.0 >60.0 >60.0     Fungus Culture (Blood or Bone Marrow): No results for input(s): FUNGUSCULTUR in the last 4320 hours.  Genital Culture: No results for input(s): LABGENI in the last 4320 hours.  Hepatitis Panel: No results for input(s): HEPBSAG, HEPAIGM, HEPCAB in the last 48 hours.    Invalid input(s): HAPBIGM  HIV Rapid: No results for input(s): HIVRAPID in the last 48 hours.  Lactic Acid:   Recent Labs   Lab 02/14/22 1819   LACTATE 1.1     Lipase: No results for input(s): LIPASE in the last 48 hours.  Microbiology Results (last 7 days)     Procedure Component Value Units Date/Time    Aerobic culture [[id number]] Collected: 02/15/22 1128    Order Status: Completed Specimen: Biopsy from Back Updated: 02/16/22 0755     Aerobic Bacterial Culture No growth    Narrative:      L3-4 / L4-5 osteodiscitis    Culture, Anaerobic [[id number]] Collected: 02/15/22 1128    Order Status: Completed Specimen: Biopsy from Back Updated: 02/16/22 0647     Anaerobic Culture Culture  in progress    Narrative:      L3-4 / L4-5 osteodiscitis    Blood Culture #2 **CANNOT BE ORDERED STAT** [[id number]] Collected: 02/14/22 1820    Order Status: Completed Specimen: Blood from Peripheral, Antecubital, Right Updated: 02/15/22 2012     Blood Culture, Routine No Growth to date      No Growth to date    Blood Culture #1 **CANNOT BE ORDERED STAT** [[id number]] Collected: 02/14/22 1820    Order Status: Completed Specimen: Blood from Peripheral, Forearm, Right Updated: 02/15/22 2012     Blood Culture, Routine No Growth to date      No Growth to date    Gram stain [[id number]] Collected: 02/15/22 1128    Order Status: Completed Specimen: Biopsy from Back Updated: 02/15/22 1815     Gram Stain Result Rare WBC's      No organisms seen    Narrative:      L3-4 / L4-5 osteodiscitis    AFB Culture & Smear [[id number]] Collected: 02/15/22 1128    Order Status: Sent Specimen: Biopsy from Back Updated: 02/15/22 1415    Fungus culture [[id number]] Collected: 02/15/22 1128    Order Status: Sent Specimen: Biopsy from Back Updated: 02/15/22 1414    Blood culture [[id number]] Collected: 02/14/22 2033    Order Status: Sent Specimen: Blood from Peripheral, Hand, Left Updated: 02/14/22 2034    Blood culture [[id number]] Collected: 02/14/22 2033    Order Status: Sent Specimen: Blood from Peripheral, Antecubital, Left Updated: 02/14/22 2034        POCT Glucose: No results for input(s): POCTGLUCOSE in the last 48 hours.  Prealbumin: No results for input(s): PREALBUMIN in the last 48 hours.  Procalcitonin:   Recent Labs   Lab 02/14/22 1819 02/14/22 2032   PROCAL 0.06 0.05     Quantiferon: No results for input(s): NIL, TBAG, TBAGNIL, MITOGENNIL, TBGOLD in the last 48 hours.  Respiratory Culture: No results for input(s): GSRESP, RESPIRATORYC in the last 4320 hours.  Urine Culture: No results for input(s): LABURIN in the last 4320 hours.  Urine Studies: No results for input(s): COLORU, APPEARANCEUA, PHUR, SPECGRAV, PROTEINUA, GLUCUA,  KETONESU, BILIRUBINUA, OCCULTUA, NITRITE, UROBILINOGEN, LEUKOCYTESUR, RBCUA, WBCUA, BACTERIA, SQUAMEPITHEL, HYALINECASTS in the last 4320 hours.    Invalid input(s): DILLONJENI  Wound Culture:   Recent Labs   Lab 02/15/22  1128   LABAERO No growth     All pertinent labs within the past 24 hours have been reviewed.  Recent Lab Results       02/16/22  0640        Albumin 2.1       Alkaline Phosphatase 111       ALT 11       Anion Gap 6       AST 35       Baso # 0.01       Basophil % 0.4       BILIRUBIN TOTAL 1.4  Comment: For infants and newborns, interpretation of results should be based  on gestational age, weight and in agreement with clinical  observations.    Premature Infant recommended reference ranges:  Up to 24 hours.............<8.0 mg/dL  Up to 48 hours............<12.0 mg/dL  3-5 days..................<15.0 mg/dL  6-29 days.................<15.0 mg/dL         BUN 10       Calcium 8.0       Chloride 104       CO2 23       Creatinine 0.6       Differential Method Automated       eGFR if  >60.0       eGFR if non  >60.0  Comment: Calculation used to obtain the estimated glomerular filtration  rate (eGFR) is the CKD-EPI equation.          Eos # 0.1       Eosinophil % 1.8       Glucose 130       Gran # (ANC) 1.9       Gran % 68.6       Hematocrit 31.0       Hemoglobin 9.7       Immature Grans (Abs) 0.02  Comment: Mild elevation in immature granulocytes is non specific and   can be seen in a variety of conditions including stress response,   acute inflammation, trauma and pregnancy. Correlation with other   laboratory and clinical findings is essential.         Immature Granulocytes 0.7       INR 1.3  Comment: Coumadin Therapy:  2.0 - 3.0 for INR for all indicators except mechanical heart valves  and antiphospholipid syndromes which should use 2.5 - 3.5.         Lymph # 0.5       Lymph % 17.8       MCH 27.9       MCHC 31.3       MCV 89       Mono # 0.3       Mono % 10.7       MPV  9.7       nRBC 0       Platelets 61       Potassium 3.7       PROTEIN TOTAL 7.3       Protime 13.2       RBC 3.48       RDW 18.3       Sodium 133       WBC 2.81             Significant Imaging:     X-Ray Lumbar Complete Including Flex And Ext [[id number]] Resulted: 02/16/22 0752   Order Status: Completed Updated: 02/16/22 0754   Narrative:     EXAMINATION:   XR LUMBAR SPINE 5 VIEW WITH FLEX AND EXT     CLINICAL HISTORY:   evaluate spinal instability; L3-4, L4-5 TLIF with hardware failure in setting of spondylodiscitis;     TECHNIQUE:   Five views of the lumbar spine plus flexion extension views were performed.     COMPARISON:   Lumbar spine exam December 31, 2021, CT of lumbar spine February 14, 2022     FINDINGS:   Stable 35 degree or so dextro rotary scoliosis of lower thoracic and lumbar spine.  No acute fractures with preserved vertebral body heights.  In reconfirmed findings of bilateral posterior hardware instrumented fusion in of L3, L4, and L5 vertebral segments with inter body spacers.  As was better seen on earlier CT, there is paralleling lucency  at the bone metal interfaces of all pedicle screws of concern for hardware loosening.  No fragmented hardware.  The CT demonstrated increased destructive endplate changes at L3-L4 and new destructive endplate changes at L4-L5 concerning for discitis-osteomyelitis reconfirmed on the standard images but better defined on CT.  Grade 1 anterolisthesis of L4 with respect to L3 and L5.  No definite instability based on flexion and extension views.  Flexion and extension views demonstrate no definite instability.    Impression:       As above.       Electronically signed by: Brandan Aguilar   Date: 02/16/2022   Time: 07:52   US Abdomen Limited with Doppler (xpd) [[id number]] Resulted: 02/15/22 1859   Order Status: Completed Updated: 02/15/22 1902   Narrative:     EXAMINATION:   US ABDOMEN LIMITED WITH DOPPLER (XPD)     CLINICAL HISTORY:   cirrhosis;     TECHNIQUE:    Complete abdominal ultrasound with doppler.  Color and spectral Doppler were performed.     COMPARISON:   CT abdomen pelvis 07/27/2021.     FINDINGS:   The visualized portion of the pancreas demonstrates 1.6 x 0.7 x 1.1 cm peripancreatic lymph node.     The liver is normal in size measuring 13 cm.  The liver demonstrates heterogeneous echotexture no focal hepatic lesions are seen.     The gallbladder is surgically absent.  The common duct is not dilated, measuring 3 mm.  No dilated intrahepatic radicles are seen.     The spleen is enlarged in size measuring 20.6 x 10.7 cm with a homogeneous echotexture.     The aorta tapers normally.     The kidneys are normal in size without focal abnormality or evidence of hydronephrosis.     There is no evidence of ascites.     The main portal vein, right portal vein, left portal vein, middle hepatic vein, right hepatic vein, left hepatic vein, SMV, and IVC are patent with proper directional flow.  The main hepatic artery is patent. The umbilical vein is patent.    Impression:       Hepatic cirrhosis and portal hypertension with satisfactory Doppler evaluation of the liver.     Enlarged peripancreatic lymph node.     Electronically signed by resident: Nuha Grubbs   Date: 02/15/2022   Time: 18:27     Electronically signed by: Yovany Beatty MD   Date: 02/15/2022   Time: 18:59   IR Biopsy Bone deep [[id number]] Resulted: 02/15/22 1322   Order Status: Completed Updated: 02/15/22 1325   Narrative:     EXAMINATION:   IR BIOPSY BONE/DISC DEEP     CLINICAL HISTORY:   L3-4 / L4-5 osteodiscitis;     41 y.o. female with medical history of Hepatitis C, liver cirrhosis, polysubstance abuse, L3 and L4 laminectomy - with admission concerns for back pain. MRI spine revealing discitis/osteomyelitis of the lumbar spine at L3-4 and L4-5. Hence plans for fluroscopy guided L3-4 intervertebral disc biopsy.     TECHNIQUE:   Informed consent was obtained.  Biplane fluoroscopy was used.  Conscious  intravenous sedation was provided by an independent radiology nurse who used continuous physiologic monitoring, and the time of the sedation was 20 minutes.     Using usual sterile technique, lidocaine local anesthesia and biplane fluoroscopy guidance, a biopsy procedure was performed via right posterolateral, para pedicular approach.  [employer] Bone Biopsy Device 13Ga was used to obtain samples from the L3-L4 intervertebral disc and L3 inferior endplate.     Samples were sent to the lab.  There were no complications.  The patient tolerated the procedure well and was transferred to the recovery room after the biopsy procedure.  Images and report were permanently recorded.  Fluoroscopy dose was 2634.2 uGycm2.  Reference air kerma was 228.3mGy.  Fluoroscopy time was 2.7 minutes     COMPARISON:   None    Impression:       Successful fluoroscopic guided biopsy of the L3-L4 intervertebral disc and L3 inferior endplate.     Electronically signed by resident: Tomy Frey   Date: 02/15/2022   Time: 12:48     Electronically signed by: Guille Gimenez MD   Date: 02/15/2022   Time: 13:22   MRI Spine Cervical-Thoracic-Lumbar W W/O Contrast (XPD) [[id number]] Resulted: 02/14/22 2334   Order Status: Completed Updated: 02/14/22 2336   Narrative:     EXAMINATION:   MRI SPINE CERVICAL-THORACIC-LUMBAR W W/O CONTRAST (XPD)     CLINICAL HISTORY:   Numbness or tingling, paresthesia (Ped 0-18y);infection;     TECHNIQUE:   Multiplanar MRI of the cervical spine, thoracic spine and lumbar spine without contrast.  Dose given is 10 cc Gadavist intravenous contrast.     COMPARISON:   Correlation with CT lumbar spine 02/14/2022     FINDINGS:   Cervical spine:     Cervical spinal cord is normal in course and caliber.  No evidence of edema or myelomalacia.  Cervicomedullary junction appears adequately maintained.     Mild straightening of normal cervical lordosis.     No evidence of vertebral body acute fracture or marrow replacement.      Moderate disc space narrowing throughout the cervical spine.  Endplate degenerative changes at C5-6 and C6-7, most prominent at the inferior endplate of C6.     Minimal disc bulge at C2-3.  Mild diffuse posterior disc osteophyte complex at C3-4, C4-5, C5-6, C6-7 and C7-T1.     No significant central canal or foraminal narrowing.     No paraspinal mass or fluid collection.     No evidence of epidural hematoma or mass.     Thoracic spine:     Thoracic spinal cord is normal in course and caliber.  No evidence of edema or myelomalacia.     Thoracic vertebral bodies appear adequately maintained.  No evidence of fracture, edema or marrow replacement.     No significant disc bulge or protrusion in the thoracic spine.     Central canal appears adequately maintained.  Probable mild foraminal narrowing in the mid thoracic spine.     Slight motion artifact obscures visualization.     Lumbar spine:     Distal spinal cord ends normally at approximately the L1 level.     Posterior surgical fusion hardware from L3 through L5.  Pedicle screws at L3, L4 and L5 on the right and L3 and L5 on the left.  Posterior rods bilaterally.     Interbody spacers at L3-4 and L4-5.     Significant metallic artifact.     Soft tissue edema noted posteriorly.  There is loss of height of the L4 and L3 vertebral bodies.  This is better visualized on prior CT.     Discitis/osteomyelitis of L3-4 and to lesser degree L4-5 better visualized on prior CT.  Ample metallic artifact obscures these levels.     Epidural component is not completely excluded at the surgical levels.    Impression:       Suspected discitis/osteomyelitis of the lumbar spine at L3-4 and L4-5 with postoperative changes of posterior fusion from L3 through L5 with probable hardware infection, better visualized on prior CT.  Neurosurgical follow-up recommended.       Electronically signed by: José Ward   Date: 02/14/2022   Time: 23:34   MRI Brain W WO Contrast [[id number]]  Resulted: 02/14/22 2259   Order Status: Completed Updated: 02/14/22 2301   Narrative:     EXAMINATION:   MRI BRAIN W WO CONTRAST     CLINICAL HISTORY:   Numbness or tingling, paresthesia (Ped 0-18y);Craniotomy, post-op;.     TECHNIQUE:   Multiplanar multisequence MR imaging of the brain was performed before and after the administration of  mL Gadavist  intravenous contrast.     COMPARISON:   None.     FINDINGS:   Intracranial compartment:     Ventricles and sulci are normal in size for age without evidence of hydrocephalus. No extra-axial blood or fluid collections.     Patchy nonspecific increased T2 and FLAIR signal in the periventricular and subcortical white matter bilaterally could be associated with chronic microvascular ischemic changes slightly greater than expected for the patient's chronologic age.  Demyelinating process such as multiple sclerosis could appear similar.  No evidence of active demyelination.  No diffusion positive areas and no pathologic enhancement on post-contrast imaging.     No mass lesion, acute hemorrhage, edema or acute infarct. No abnormal enhancement.     Normal vascular flow voids are preserved.     Skull/extracranial contents (limited evaluation): Bone marrow signal intensity is normal.    Impression:       1. No acute intracranial process.   2. Patchy nonspecific increased T2 and FLAIR signal in the periventricular and subcortical white matter bilaterally could be associated with chronic microvascular ischemic changes, slightly greater than expected for the patient's chronologic age.  A demyelinating process such as multiple sclerosis could appear similar.  No evidence of active demyelination.  Recommend clinical correlation.       Electronically signed by: José Ward   Date: 02/14/2022   Time: 22:59       I personally reviewed records today as well as relevant labs, cultures, and imaging. This includes old hospital and clinic records.

## 2022-02-16 NOTE — PLAN OF CARE
Problem: Adult Inpatient Plan of Care  Goal: Plan of Care Review  Outcome: Ongoing, Progressing  Flowsheets (Taken 2/16/2022 0617)  Plan of Care Reviewed With: patient     Problem: Adult Inpatient Plan of Care  Goal: Optimal Comfort and Wellbeing  Outcome: Ongoing, Not Progressing  Intervention: Monitor Pain and Promote Comfort  Flowsheets (Taken 2/16/2022 0617)  Pain Management Interventions:   pain management plan reviewed with patient/caregiver   medication offered   quiet environment facilitated   pillow support provided   care clustered   Pt continues to c/o pain with movement. Also very sore at site of Bx. Oral pain meds given with some relief. Plan of care reviewed with patient and they verbalized understanding. All comments and concerns addressed. Bed locked in lowest position with bed alarm set, call light within reach. Safety precautions maintained. VSS, see flowsheets. Continue  Vanc and Ceftriaxone  as  well as  IV fluid maintenance

## 2022-02-16 NOTE — SUBJECTIVE & OBJECTIVE
Interval History: NAEO. Pt reports that RUQ tenderness is somewhat improved. Back pain and weakness similar to yesterday. She denies CP, SOB.      Review of Systems   Constitutional: Negative for chills and fever.   HENT: Negative for congestion.    Eyes: Negative for visual disturbance.   Respiratory: Negative for chest tightness, shortness of breath and wheezing.    Cardiovascular: Negative for chest pain and palpitations.   Gastrointestinal: Negative for abdominal pain, nausea and vomiting.   Genitourinary: Negative for dysuria.   Musculoskeletal: Positive for back pain and gait problem. Negative for neck pain.   Skin: Negative for color change.   Neurological: Positive for weakness.   Psychiatric/Behavioral: Negative for agitation and confusion.     Objective:     Vital Signs (Most Recent):  Temp: 98.1 °F (36.7 °C) (02/16/22 0813)  Pulse: 88 (02/16/22 1129)  Resp: 18 (02/16/22 1129)  BP: (!) 122/59 (02/16/22 1129)  SpO2: 97 % (02/16/22 1129) Vital Signs (24h Range):  Temp:  [97.2 °F (36.2 °C)-98.6 °F (37 °C)] 98.1 °F (36.7 °C)  Pulse:  [84-96] 88  Resp:  [16-18] 18  SpO2:  [94 %-97 %] 97 %  BP: (109-136)/(53-70) 122/59     Weight: 131.5 kg (290 lb)  Body mass index is 45.42 kg/m².    Intake/Output Summary (Last 24 hours) at 2/16/2022 1419  Last data filed at 2/16/2022 1300  Gross per 24 hour   Intake 1186.46 ml   Output 1600 ml   Net -413.54 ml      Physical Exam  Constitutional:       General: She is not in acute distress.  HENT:      Head: Normocephalic and atraumatic.      Nose: Nose normal.   Eyes:      Extraocular Movements: Extraocular movements intact.      Conjunctiva/sclera: Conjunctivae normal.   Cardiovascular:      Rate and Rhythm: Normal rate and regular rhythm.      Heart sounds: Normal heart sounds. No murmur heard.      Pulmonary:      Effort: Pulmonary effort is normal. No respiratory distress.      Breath sounds: No wheezing or rhonchi.   Abdominal:      General: Bowel sounds are normal. There  is no distension.      Palpations: Abdomen is soft.      Tenderness: There is abdominal tenderness (RUQ/epigastric).   Musculoskeletal:         General: Tenderness (midline and paravertebral tenderness over lumbar back) present. No swelling.      Cervical back: Normal range of motion.   Skin:     General: Skin is warm and dry.      Comments: Well healed scar over lumbar back   Neurological:      General: No focal deficit present.      Mental Status: She is alert and oriented to person, place, and time.   Psychiatric:         Mood and Affect: Mood normal.         Behavior: Behavior normal.         Significant Labs: All pertinent labs within the past 24 hours have been reviewed.    Significant Imaging: I have reviewed all pertinent imaging results/findings within the past 24 hours.

## 2022-02-16 NOTE — PROGRESS NOTES
[facility] ([facility])  Neurosurgery  Progress Note    Subjective:     History of Present Illness: Rachel Zazueta is a 41 y.o.  female with PMHx of Hepatitis C, liver cirrhosis, pancytopenia, and polysubstance abuse, who presents to clinic for follow up with new imaging s/p L3 and L4 laminectomy for evacuation of epidural abscess and L3-L5 TLIF on 04/16/2021.     The pt c/o worsening back and left leg pain since October. She states that she is no longer using IV drugs. States only using marijuana. Describes the left leg pain as a shock down the leg when standing for 10 minutes. Denies taking any antibiotics. Endorses new weakness in the legs. Denies b/b dysfunction. Denies new neck pain. She ambulates with a walker at home. States that she smoke.    She presents to ED as direct admit from clinic.       Post-Op Info:  Procedure(s) (LRB):  **AIRO** FUSION, SPINE T10-PELVIS (N/A)         Interval History:  NAEON. AFVSS. Reports increased back pain with standing after the biopsy yesterday. Currently tolerable. Voiding spontaneously. Plan for OR Monday 2/21.    Medications:  Continuous Infusions:    Scheduled Meds:   cefTRIAXone (ROCEPHIN) IVPB  2 g Intravenous Q24H    pantoprazole  40 mg Oral Daily    polyethylene glycol  17 g Oral Daily    pregabalin  50 mg Oral TID    sertraline  25 mg Oral Daily    vancomycin (VANCOCIN) IVPB  2,000 mg Intravenous Q12H     PRN Meds:acetaminophen, dextrose 10%, dextrose 10%, glucagon (human recombinant), glucose, glucose, glucose, HYDROmorphone, melatonin, oxyCODONE, Pharmacy to dose Vancomycin consult **AND** vancomycin - pharmacy to dose       Objective:     Weight: 131.5 kg (290 lb)  Body mass index is 45.42 kg/m².  Vital Signs (Most Recent):  Temp: 98.1 °F (36.7 °C) (02/16/22 0813)  Pulse: 88 (02/16/22 1129)  Resp: 18 (02/16/22 1129)  BP: (!) 122/59 (02/16/22 1129)  SpO2: 97 % (02/16/22 1129) Vital Signs (24h Range):  Temp:  [97.2 °F (36.2 °C)-98.6 °F (37 °C)]  98.1 °F (36.7 °C)  Pulse:  [84-96] 88  Resp:  [16-18] 18  SpO2:  [94 %-97 %] 97 %  BP: (109-136)/(53-70) 122/59     Date 02/16/22 0700 - 02/17/22 0659   Shift 4964-8909 0811-7993 5537-1653 24 Hour Total   INTAKE   I.V.(mL/kg) 582.8(4.4)   582.8(4.4)   IV Piggyback 603.7   603.7   Shift Total(mL/kg) 1186.5(9)   1186.5(9)   OUTPUT   Urine(mL/kg/hr) 400   400   Shift Total(mL/kg) 400(3)   400(3)   Weight (kg) 131.5 131.5 131.5 131.5                   Female External Urinary Catheter 02/14/22 2300 (Active)   Output (mL) 650 mL 02/15/22 0645       Physical Exam      Neurosurgery Physical Exam      General: well developed, well nourished, no distress.   Head: normocephalic, atraumatic  Neck: No tracheal deviation.   Neurologic: Alert and oriented. Thought content appropriate.  GCS: Motor: 6/Verbal: 5/Eyes: 4 GCS Total: 15  Mental Status: Awake, Alert, Oriented x 4  Language: No aphasia  Speech: No dysarthria  Cranial nerves: face symmetric, tongue midline, CN II-XII grossly intact, EOMI.   Pulmonary: normal respirations, no signs of respiratory distress  Abdomen: soft, non-distended, not tender to palpation  Sensory: intact to light touch throughout  Motor Strength: Moves all extremities spontaneously with good tone.  Pain limited weakness to proximal LLE. Full strength upper and lower extremities. No abnormal movements seen.     Strength  Deltoids Triceps Biceps Wrist Extension Wrist Flexion Hand    Upper: R 5/5 5/5 5/5 5/5 5/5 5/5    L 5/5 5/5 5/5 5/5 5/5 5/5     Iliopsoas Quadriceps Knee  Flexion Tibialis  anterior Gastro- cnemius EHL   Lower: R 5/5 5/5 5/5 5/5 5/5 5/5    L 4+/5 4+/5 4+/5 5/5 5/5 5/5     Skin: Skin is warm, dry and intact.        Significant Labs:  Recent Labs   Lab 02/14/22  1819 02/15/22  0501 02/16/22  0640    75 130*   * 132* 133*   K 4.3 4.1 3.7    102 104   CO2 27 25 23   BUN 16 12 10   CREATININE 0.7 0.5 0.6   CALCIUM 8.5* 8.0* 8.0*     Recent Labs   Lab 02/14/22 1819  02/15/22  0501 02/16/22  0640   WBC 5.97 4.91 2.81*   HGB 10.0* 9.8* 9.7*   HCT 32.4* 31.6* 31.0*   PLT 59* 57* 61*     Recent Labs   Lab 02/14/22 2032 02/16/22  0640   INR 1.3* 1.3*   APTT 29.9  --      Microbiology Results (last 7 days)     Procedure Component Value Units Date/Time    AFB Culture & Smear [[id number]] Collected: 02/15/22 1128    Order Status: Completed Specimen: Biopsy from Back Updated: 02/16/22 1202     AFB CULTURE STAIN No acid fast bacilli seen.    Narrative:      L3-4 / L4-5 osteodiscitis    Aerobic culture [[id number]] Collected: 02/15/22 1128    Order Status: Completed Specimen: Biopsy from Back Updated: 02/16/22 0755     Aerobic Bacterial Culture No growth    Narrative:      L3-4 / L4-5 osteodiscitis    Culture, Anaerobic [[id number]] Collected: 02/15/22 1128    Order Status: Completed Specimen: Biopsy from Back Updated: 02/16/22 0647     Anaerobic Culture Culture in progress    Narrative:      L3-4 / L4-5 osteodiscitis    Blood Culture #2 **CANNOT BE ORDERED STAT** [[id number]] Collected: 02/14/22 1820    Order Status: Completed Specimen: Blood from Peripheral, Antecubital, Right Updated: 02/15/22 2012     Blood Culture, Routine No Growth to date      No Growth to date    Blood Culture #1 **CANNOT BE ORDERED STAT** [[id number]] Collected: 02/14/22 1820    Order Status: Completed Specimen: Blood from Peripheral, Forearm, Right Updated: 02/15/22 2012     Blood Culture, Routine No Growth to date      No Growth to date    Gram stain [[id number]] Collected: 02/15/22 1128    Order Status: Completed Specimen: Biopsy from Back Updated: 02/15/22 1815     Gram Stain Result Rare WBC's      No organisms seen    Narrative:      L3-4 / L4-5 osteodiscitis    Fungus culture [[id number]] Collected: 02/15/22 1128    Order Status: Sent Specimen: Biopsy from Back Updated: 02/15/22 1414    Blood culture [[id number]] Collected: 02/14/22 2033    Order Status: Sent Specimen: Blood from Peripheral, Hand, Left Updated:  02/14/22 2034    Blood culture [[id number]] Collected: 02/14/22 2033    Order Status: Sent Specimen: Blood from Peripheral, Antecubital, Left Updated: 02/14/22 2034        Recent Lab Results       02/16/22  1206   02/16/22  0640        Alcohol, Urine <10         Benzodiazepines Presumptive Positive         Methadone metabolites Presumptive Positive         Phencyclidine Negative         Albumin   2.1       Alkaline Phosphatase   111       ALT   11       Amphetamine Screen, Ur Presumptive Positive         Anion Gap   6       AST   35       Barbiturate Screen, Ur Negative         Baso #   0.01       Basophil %   0.4       BILIRUBIN TOTAL   1.4  Comment: For infants and newborns, interpretation of results should be based  on gestational age, weight and in agreement with clinical  observations.    Premature Infant recommended reference ranges:  Up to 24 hours.............<8.0 mg/dL  Up to 48 hours............<12.0 mg/dL  3-5 days..................<15.0 mg/dL  6-29 days.................<15.0 mg/dL         BUN   10       Calcium   8.0       Chloride   104       CO2   23       Cocaine (Metab.) Negative         Creatinine   0.6       Creatinine, Urine 34.0         Differential Method   Automated       eGFR if    >60.0       eGFR if non    >60.0  Comment: Calculation used to obtain the estimated glomerular filtration  rate (eGFR) is the CKD-EPI equation.          Eos #   0.1       Eosinophil %   1.8       Glucose   130       Gran # (ANC)   1.9       Gran %   68.6       Hematocrit   31.0       Hemoglobin   9.7       Immature Grans (Abs)   0.02  Comment: Mild elevation in immature granulocytes is non specific and   can be seen in a variety of conditions including stress response,   acute inflammation, trauma and pregnancy. Correlation with other   laboratory and clinical findings is essential.         Immature Granulocytes   0.7       INR   1.3  Comment: Coumadin Therapy:  2.0 - 3.0 for INR for  all indicators except mechanical heart valves  and antiphospholipid syndromes which should use 2.5 - 3.5.         Lymph #   0.5       Lymph %   17.8       MCH   27.9       MCHC   31.3       MCV   89       Mono #   0.3       Mono %   10.7       MPV   9.7       nRBC   0       Opiate Scrn, Ur Presumptive Positive         Platelets   61       Potassium   3.7       PROTEIN TOTAL   7.3       Protime   13.2       RBC   3.48       RDW   18.3       Sodium   133       Marijuana (THC) Metabolite Negative         Toxicology Information SEE COMMENT  Comment: This screen includes the following classes of drugs at the listed   cut-off:    Benzodiazepines 200 ng/ml  Methadone 300 ng/ml  Cocaine metabolite 300 ng/ml  Opiates 300 ng/ml  Barbiturates 200 ng/ml  Amphetamines 1000 ng/ml  Marijuana metabs (THC) 50 ng/ml  Phencyclidine (PCP) 25 ng/ml    This is a screening test. If results do not correlate with clinical   presentation, then a confirmatory send out test is advised.     This report is intended for use in clinical monitoring and management   of   patients. It is not intended for use in employment related drug   testing.           WBC   2.81           All pertinent labs from the last 24 hours have been reviewed.    Significant Diagnostics:  I have reviewed all pertinent imaging results/findings within the past 24 hours.        Assessment/Plan:     Spondylodiscitis  Rachel Zazueta is a 41 F with PMH hepatitis C, cirrhosis, pancytopenia, nicotine abuse, hx IVDU (last use 2 years ago), strep agalactae bacteremia, s/p L3-5 TLIF on 4/16/2021 who presents with persistent and progressive lumbar spondylodiscitis with resultant hardware failure and new scoliosis.     --Patient admitted to NPU on telemetry;       -q4h neurochecks on floor  --Plan for OR 2/21 for hardware removal, washout, extension of previous fusion.  --XR scoliosis and XR lumbar flex/ext ordered  --All labs and diagnostics reviewed  --Follow-up MRI Brain, C/T/L  w/wo contrast   -No evidence of osteomyelitis, discitis, or epidural abscess within the cervical or thoracic spine   -No diffusion positive or abnormal enhancement within the brain   -MRI brain shows patchy nonspecific increased T2 and FLAIR signal in the periventricular and subcortical white matter bilaterally. Could be due to chronic microvascular ischemic changes vs demyelinating disease given patient's age. No evidence of active demyelination.   -DDD C4-7 with mild to moderate canal stenosis  --Full infectious w/u    -ESR 57, CRP 29, Procal 0.05, Lactate 1.1, BCx 2/14 NGTD, UA/Ucx pending   -F/u ID recs - started on empiric abx. Will f/u cx from disc bx.   --S/p L3-4 disc biopsy and L3 bone biopsy with IR 2/15 - Gram stain rare WBC. Cx pending.  -- consult for risk stratification and medical optimization. Appreciate assistance. RCRI 0, 3.9% risk of perioperative cardiac complication.   --Hold anti-plt/coag medications  --Monitor for urinary retention - voiding spontaneous. Bladder scan prn.  --Continue current regimen for pain control   --Continue to monitor clinically, notify NSGY immediately with any changes in neuro status    Dispo: pending surgical intervention         Lazara Yost PA-C  Neurosurgery  [facility] - Neurosurgery ([facility])

## 2022-02-16 NOTE — ASSESSMENT & PLAN NOTE
40 yo female Hx IVDU in past, denies current use, with Hx of GBS infection, bacteremia, and epidural abscess L3-4 in 4/2021 s/p washout and fusion.  Completed 4 weeks of IV ceftriaxone then stopped after PICC accidentally removed and lost to FU. 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    - S/P IR disc aspiration L3-4 2/15/21 - gram stain negative  - Blood cultures ngtd  - Abx help prior to procedure  - stable non septic    Plan:  1. Start empiric vanc and ceftriaxone  2. FU cultures   3. FU NSGY recs  4. DIscussed with ID staff - will follow

## 2022-02-16 NOTE — ASSESSMENT & PLAN NOTE
History of HCV. Needs Hepatology follow up after discharge for further evaluation and treatment.

## 2022-02-16 NOTE — PROGRESS NOTES
[facility] - Neurosurgery ([facility])  [facility] Medicine  Progress Note    Patient Name: Rachel Zazueta  MRN: [id number]  Patient Class: IP- Inpatient   Admission Date: 2/14/2022  Length of Stay: 2 days  Attending Physician: Guille Templeton MD  Primary Care Provider: Dannielle Merino DO        Subjective:     Principal Problem:<principal problem not specified>        HPI:  Rachel Zazueta is a 42 y/o F with a PMH of cirrhosis, Hep C, anemia, polysubstance abuse, and epidural abscess s/p L3-L4 laminectomy and L3-L5 TLIF (4/2021; blood and surgical cultures positive for group B strep) who was admitted from NS clinic for workup of severe back pain and LE weakness. She reports that she has had worsening pain in her lower back and hips since October. She also reports shooting pain down her left leg. She ambulates with a walker at home due to the pain and difficulty with balance. She denies bowel or bladder incontinence. She denies CP, SOB, nausea, vomiting. Denies hx of HTN or DM. She was lost to follow up after her previous surgery, stating that she did not go to her appointments because she was feeling good. She states that her last IV meth use was 8/2020, and last meth use was after her previous surgery 4/2021 (however tox screen 7/2021 positive for amphetamines). She reports marijuana use approx 3x per week and smokes cigarettes 1/2 pack per day. She denies use of other substances including cocaine. MRI C/T/L spine showed suspected discitis/osteomyelitis at L3-4 and L4-5 with probable hardware infection. L3-4 disc biopsy obtained by IR. [facility] medicine was consulted for preop risk stratification and medical optimization.       Overview/Hospital Course:  No notes on file    Interval History: NAEO. Pt reports that RUQ tenderness is somewhat improved. Back pain and weakness similar to yesterday. She denies CP, SOB.      Review of Systems   Constitutional: Negative for chills and fever.   HENT: Negative for  congestion.    Eyes: Negative for visual disturbance.   Respiratory: Negative for chest tightness, shortness of breath and wheezing.    Cardiovascular: Negative for chest pain and palpitations.   Gastrointestinal: Negative for abdominal pain, nausea and vomiting.   Genitourinary: Negative for dysuria.   Musculoskeletal: Positive for back pain and gait problem. Negative for neck pain.   Skin: Negative for color change.   Neurological: Positive for weakness.   Psychiatric/Behavioral: Negative for agitation and confusion.     Objective:     Vital Signs (Most Recent):  Temp: 98.1 °F (36.7 °C) (02/16/22 0813)  Pulse: 88 (02/16/22 1129)  Resp: 18 (02/16/22 1129)  BP: (!) 122/59 (02/16/22 1129)  SpO2: 97 % (02/16/22 1129) Vital Signs (24h Range):  Temp:  [97.2 °F (36.2 °C)-98.6 °F (37 °C)] 98.1 °F (36.7 °C)  Pulse:  [84-96] 88  Resp:  [16-18] 18  SpO2:  [94 %-97 %] 97 %  BP: (109-136)/(53-70) 122/59     Weight: 131.5 kg (290 lb)  Body mass index is 45.42 kg/m².    Intake/Output Summary (Last 24 hours) at 2/16/2022 1419  Last data filed at 2/16/2022 1300  Gross per 24 hour   Intake 1186.46 ml   Output 1600 ml   Net -413.54 ml      Physical Exam  Constitutional:       General: She is not in acute distress.  HENT:      Head: Normocephalic and atraumatic.      Nose: Nose normal.   Eyes:      Extraocular Movements: Extraocular movements intact.      Conjunctiva/sclera: Conjunctivae normal.   Cardiovascular:      Rate and Rhythm: Normal rate and regular rhythm.      Heart sounds: Normal heart sounds. No murmur heard.      Pulmonary:      Effort: Pulmonary effort is normal. No respiratory distress.      Breath sounds: No wheezing or rhonchi.   Abdominal:      General: Bowel sounds are normal. There is no distension.      Palpations: Abdomen is soft.      Tenderness: There is abdominal tenderness (RUQ/epigastric).   Musculoskeletal:         General: Tenderness (midline and paravertebral tenderness over lumbar back) present. No  swelling.      Cervical back: Normal range of motion.   Skin:     General: Skin is warm and dry.      Comments: Well healed scar over lumbar back   Neurological:      General: No focal deficit present.      Mental Status: She is alert and oriented to person, place, and time.   Psychiatric:         Mood and Affect: Mood normal.         Behavior: Behavior normal.         Significant Labs: All pertinent labs within the past 24 hours have been reviewed.    Significant Imaging: I have reviewed all pertinent imaging results/findings within the past 24 hours.      Assessment/Plan:      Hyperbilirubinemia  Tbili 2.1 on admission.     Recommendations:  - daily CMP, trend Tbili     Tobacco use disorder  Reports currently smoking 1/2 ppd.      Pre-op evaluation  40 y/o F with hx of epidural abscess s/p L3 and L4 laminectomy and L3-L5 TLIF (4/2021) admitted from NS clinic with worsening lumbar back pain and LE weakness. MRI C/T/L spine showed suspected discitis/osteomyelitis at L3-4 and L4-5 with probable hardware infection. IR consulted for biopsy. TTE 2/15 with EF 60%. EKG with NS.    Surgical Risk Assessment    Active cardiac issues:  Active decompensated heart failure? No   Unstable angina?  No   Significant uncontrolled arrhythmias? No   Severe valvular heart disease-Aortic or Mitral Stenosis? No   Recent MI or coronary revascularization < 30 days? No     Cardiac Risk Factors  History of CAD/ischemic heart disease? No   History of cerebrovascular disease? No   History of compensated heart failure? No   Type 2 diabetes requiring insulin? No   Serum Creatinine > 2? No   Total cardiac risk factors 0     Functional mets limited by pain, able to walk with walker.    < 4* METs -unable to walk > 2 blocks on level ground without stopping due to symptoms  - eating, dressing, toileting, walking indoors, light housework. POOR   > 4* METs -climbing > 1 flight of stairs without stopping  -walking up hill > 1-2 blocks  -scrubbing  floors  -moving furniture  - golf, bowling, dancing or tennis  -running short distance MODERATE to EXCELLENT   * performance of any one of the activities       Cardiovascular Risk Assessment:  Non-emergent surgery.  No active cardiac problems (such as unstable angina, decompensated heart failure, significant uncontrolled arrhythmias or severe valvular disease).  Intermediate risk surgery.  Functional Status: limited by pain  Her revised cardiac risk index is 0 (3.9% 30-day risk of death, MI, or cardiac arrest).       Recommendation:  - Proceed to Surgery  - Will continue to follow        Thrombocytopenia  Chronic, likely due to cirrhosis.     Recommendations:  - daily CBC      Spondylodiscitis  Hx of epidural abscess 4/2021 with blood and surgical cultures positive for group B strep.  Echo 2/15 negative for endocarditis. ID following for antibiotic recs.     Recommendations:  - blood cultures NGTD  - biopsy gram stain negative, cultures and pathology pending   - urine tox screen positive for amphetamines, methadone; received opiates and benzos while admitted   - continue antibiotics per ID recs      Chronic hepatitis C with cirrhosis  PMH cirrhosis 2/2 HCV. Pt denies ever receiving treatment for HCV. Pt reports previous liver biopsy, but no records of this available. She has been referred to hepatology previously but did not follow up.   MELD-Na score: 11 at 2/16/2022  6:40 AM  MELD score: 11 at 2/16/2022  6:40 AM  Calculated from:  Serum Creatinine: 0.6 mg/dL (Using min of 1 mg/dL) at 2/16/2022  6:40 AM  Serum Sodium: 133 mmol/L at 2/16/2022  6:40 AM  Total Bilirubin: 1.4 mg/dL at 2/16/2022  6:40 AM  INR(ratio): 1.3 at 2/16/2022  6:40 AM  Age: 41 years    Recommendations:  - U/S abdomen with doppler showed cirrhosis and portal hypertension satisfactory Doppler evaluation   - daily CMP and INR  - will need hepatology referral on discharge   - check lipase given RUQ/epigastric pain     Pancytopenia  Hx of bone marrow  biopsy 2017 that was nondiagnostic.     Recommendations:  - daily labs  - transfuse for Hgb <7  - transfuse for plt <10 or <50 with bleeding        VTE Risk Mitigation (From admission, onward)         Ordered     IP VTE HIGH RISK PATIENT  Once         02/14/22 1936     Place sequential compression device  Until discontinued         02/14/22 1936                Discharge Planning   SHIRA: 2/18/2022     Code Status: Full Code   Is the patient medically ready for discharge?:     Reason for patient still in hospital (select all that apply): Treatment  Discharge Plan A: Home with family                Cecille Rhodes MD  Department of Hospital Medicine   [facility] - Neurosurgery ([facility])

## 2022-02-16 NOTE — ASSESSMENT & PLAN NOTE
Hx of bone marrow biopsy 2017 that was nondiagnostic.     Recommendations:  - daily labs  - transfuse for Hgb <7  - transfuse for plt <10 or <50 with bleeding     S/P CYNDI to RCA  CYNDI to LAD  Right radial site hematoma resolved, slight bruising, keep site dry  DAPT:  Plavix Aspirin  Life style modifications reviewed:  Diet/ exercise and life style modifications  Patient verbalizes understanding  d/c home  follow up with MD Sampson in one week.

## 2022-02-16 NOTE — SUBJECTIVE & OBJECTIVE
Interval History:  NAEON. AFVSS. Reports increased back pain with standing after the biopsy yesterday. Currently tolerable. Voiding spontaneously. Plan for OR Monday 2/21.    Medications:  Continuous Infusions:    Scheduled Meds:   cefTRIAXone (ROCEPHIN) IVPB  2 g Intravenous Q24H    pantoprazole  40 mg Oral Daily    polyethylene glycol  17 g Oral Daily    pregabalin  50 mg Oral TID    sertraline  25 mg Oral Daily    vancomycin (VANCOCIN) IVPB  2,000 mg Intravenous Q12H     PRN Meds:acetaminophen, dextrose 10%, dextrose 10%, glucagon (human recombinant), glucose, glucose, glucose, HYDROmorphone, melatonin, oxyCODONE, Pharmacy to dose Vancomycin consult **AND** vancomycin - pharmacy to dose       Objective:     Weight: 131.5 kg (290 lb)  Body mass index is 45.42 kg/m².  Vital Signs (Most Recent):  Temp: 98.1 °F (36.7 °C) (02/16/22 0813)  Pulse: 88 (02/16/22 1129)  Resp: 18 (02/16/22 1129)  BP: (!) 122/59 (02/16/22 1129)  SpO2: 97 % (02/16/22 1129) Vital Signs (24h Range):  Temp:  [97.2 °F (36.2 °C)-98.6 °F (37 °C)] 98.1 °F (36.7 °C)  Pulse:  [84-96] 88  Resp:  [16-18] 18  SpO2:  [94 %-97 %] 97 %  BP: (109-136)/(53-70) 122/59     Date 02/16/22 0700 - 02/17/22 0659   Shift 1818-5627 1841-4342 2752-7340 24 Hour Total   INTAKE   I.V.(mL/kg) 582.8(4.4)   582.8(4.4)   IV Piggyback 603.7   603.7   Shift Total(mL/kg) 1186.5(9)   1186.5(9)   OUTPUT   Urine(mL/kg/hr) 400   400   Shift Total(mL/kg) 400(3)   400(3)   Weight (kg) 131.5 131.5 131.5 131.5                   Female External Urinary Catheter 02/14/22 2300 (Active)   Output (mL) 650 mL 02/15/22 0645       Physical Exam      Neurosurgery Physical Exam      General: well developed, well nourished, no distress.   Head: normocephalic, atraumatic  Neck: No tracheal deviation.   Neurologic: Alert and oriented. Thought content appropriate.  GCS: Motor: 6/Verbal: 5/Eyes: 4 GCS Total: 15  Mental Status: Awake, Alert, Oriented x 4  Language: No aphasia  Speech: No  dysarthria  Cranial nerves: face symmetric, tongue midline, CN II-XII grossly intact, EOMI.   Pulmonary: normal respirations, no signs of respiratory distress  Abdomen: soft, non-distended, not tender to palpation  Sensory: intact to light touch throughout  Motor Strength: Moves all extremities spontaneously with good tone.  Pain limited weakness to proximal LLE. Full strength upper and lower extremities. No abnormal movements seen.     Strength  Deltoids Triceps Biceps Wrist Extension Wrist Flexion Hand    Upper: R 5/5 5/5 5/5 5/5 5/5 5/5    L 5/5 5/5 5/5 5/5 5/5 5/5     Iliopsoas Quadriceps Knee  Flexion Tibialis  anterior Gastro- cnemius EHL   Lower: R 5/5 5/5 5/5 5/5 5/5 5/5    L 4+/5 4+/5 4+/5 5/5 5/5 5/5     Skin: Skin is warm, dry and intact.        Significant Labs:  Recent Labs   Lab 02/14/22  1819 02/15/22  0501 02/16/22  0640    75 130*   * 132* 133*   K 4.3 4.1 3.7    102 104   CO2 27 25 23   BUN 16 12 10   CREATININE 0.7 0.5 0.6   CALCIUM 8.5* 8.0* 8.0*     Recent Labs   Lab 02/14/22  1819 02/15/22  0501 02/16/22  0640   WBC 5.97 4.91 2.81*   HGB 10.0* 9.8* 9.7*   HCT 32.4* 31.6* 31.0*   PLT 59* 57* 61*     Recent Labs   Lab 02/14/22 2032 02/16/22  0640   INR 1.3* 1.3*   APTT 29.9  --      Microbiology Results (last 7 days)     Procedure Component Value Units Date/Time    AFB Culture & Smear [[id number]] Collected: 02/15/22 1128    Order Status: Completed Specimen: Biopsy from Back Updated: 02/16/22 1202     AFB CULTURE STAIN No acid fast bacilli seen.    Narrative:      L3-4 / L4-5 osteodiscitis    Aerobic culture [[id number]] Collected: 02/15/22 1128    Order Status: Completed Specimen: Biopsy from Back Updated: 02/16/22 0755     Aerobic Bacterial Culture No growth    Narrative:      L3-4 / L4-5 osteodiscitis    Culture, Anaerobic [[id number]] Collected: 02/15/22 1128    Order Status: Completed Specimen: Biopsy from Back Updated: 02/16/22 0647     Anaerobic Culture Culture in  progress    Narrative:      L3-4 / L4-5 osteodiscitis    Blood Culture #2 **CANNOT BE ORDERED STAT** [[id number]] Collected: 02/14/22 1820    Order Status: Completed Specimen: Blood from Peripheral, Antecubital, Right Updated: 02/15/22 2012     Blood Culture, Routine No Growth to date      No Growth to date    Blood Culture #1 **CANNOT BE ORDERED STAT** [[id number]] Collected: 02/14/22 1820    Order Status: Completed Specimen: Blood from Peripheral, Forearm, Right Updated: 02/15/22 2012     Blood Culture, Routine No Growth to date      No Growth to date    Gram stain [[id number]] Collected: 02/15/22 1128    Order Status: Completed Specimen: Biopsy from Back Updated: 02/15/22 1815     Gram Stain Result Rare WBC's      No organisms seen    Narrative:      L3-4 / L4-5 osteodiscitis    Fungus culture [[id number]] Collected: 02/15/22 1128    Order Status: Sent Specimen: Biopsy from Back Updated: 02/15/22 1414    Blood culture [[id number]] Collected: 02/14/22 2033    Order Status: Sent Specimen: Blood from Peripheral, Hand, Left Updated: 02/14/22 2034    Blood culture [[id number]] Collected: 02/14/22 2033    Order Status: Sent Specimen: Blood from Peripheral, Antecubital, Left Updated: 02/14/22 2034        Recent Lab Results       02/16/22  1206   02/16/22  0640        Alcohol, Urine <10         Benzodiazepines Presumptive Positive         Methadone metabolites Presumptive Positive         Phencyclidine Negative         Albumin   2.1       Alkaline Phosphatase   111       ALT   11       Amphetamine Screen, Ur Presumptive Positive         Anion Gap   6       AST   35       Barbiturate Screen, Ur Negative         Baso #   0.01       Basophil %   0.4       BILIRUBIN TOTAL   1.4  Comment: For infants and newborns, interpretation of results should be based  on gestational age, weight and in agreement with clinical  observations.    Premature Infant recommended reference ranges:  Up to 24 hours.............<8.0 mg/dL  Up to 48  hours............<12.0 mg/dL  3-5 days..................<15.0 mg/dL  6-29 days.................<15.0 mg/dL         BUN   10       Calcium   8.0       Chloride   104       CO2   23       Cocaine (Metab.) Negative         Creatinine   0.6       Creatinine, Urine 34.0         Differential Method   Automated       eGFR if    >60.0       eGFR if non    >60.0  Comment: Calculation used to obtain the estimated glomerular filtration  rate (eGFR) is the CKD-EPI equation.          Eos #   0.1       Eosinophil %   1.8       Glucose   130       Gran # (ANC)   1.9       Gran %   68.6       Hematocrit   31.0       Hemoglobin   9.7       Immature Grans (Abs)   0.02  Comment: Mild elevation in immature granulocytes is non specific and   can be seen in a variety of conditions including stress response,   acute inflammation, trauma and pregnancy. Correlation with other   laboratory and clinical findings is essential.         Immature Granulocytes   0.7       INR   1.3  Comment: Coumadin Therapy:  2.0 - 3.0 for INR for all indicators except mechanical heart valves  and antiphospholipid syndromes which should use 2.5 - 3.5.         Lymph #   0.5       Lymph %   17.8       MCH   27.9       MCHC   31.3       MCV   89       Mono #   0.3       Mono %   10.7       MPV   9.7       nRBC   0       Opiate Scrn, Ur Presumptive Positive         Platelets   61       Potassium   3.7       PROTEIN TOTAL   7.3       Protime   13.2       RBC   3.48       RDW   18.3       Sodium   133       Marijuana (THC) Metabolite Negative         Toxicology Information SEE COMMENT  Comment: This screen includes the following classes of drugs at the listed   cut-off:    Benzodiazepines 200 ng/ml  Methadone 300 ng/ml  Cocaine metabolite 300 ng/ml  Opiates 300 ng/ml  Barbiturates 200 ng/ml  Amphetamines 1000 ng/ml  Marijuana metabs (THC) 50 ng/ml  Phencyclidine (PCP) 25 ng/ml    This is a screening test. If results do not correlate with  clinical   presentation, then a confirmatory send out test is advised.     This report is intended for use in clinical monitoring and management   of   patients. It is not intended for use in employment related drug   testing.           WBC   2.81           All pertinent labs from the last 24 hours have been reviewed.    Significant Diagnostics:  I have reviewed all pertinent imaging results/findings within the past 24 hours.

## 2022-02-16 NOTE — ASSESSMENT & PLAN NOTE
Rachel Zazueta is a 41 F with PMH hepatitis C, cirrhosis, pancytopenia, nicotine abuse, hx IVDU (last use 2 years ago), strep agalactae bacteremia, s/p L3-5 TLIF on 4/16/2021 who presents with persistent and progressive lumbar spondylodiscitis with resultant hardware failure and new scoliosis.     --Patient admitted to [facility] on telemetry;       -q4h neurochecks on floor  --Plan for OR 2/21 for hardware removal, washout, extension of previous fusion.  --XR scoliosis and XR lumbar flex/ext ordered  --All labs and diagnostics reviewed  --Follow-up MRI Brain, C/T/L w/wo contrast   -No evidence of osteomyelitis, discitis, or epidural abscess within the cervical or thoracic spine   -No diffusion positive or abnormal enhancement within the brain   -MRI brain shows patchy nonspecific increased T2 and FLAIR signal in the periventricular and subcortical white matter bilaterally. Could be due to chronic microvascular ischemic changes vs demyelinating disease given patient's age. No evidence of active demyelination.   -DDD C4-7 with mild to moderate canal stenosis  --Full infectious w/u    -ESR 57, CRP 29, Procal 0.05, Lactate 1.1, BCx 2/14 NGTD, UA/Ucx pending   -F/u ID recs - started on empiric abx. Will f/u cx from disc bx.   --S/p L3-4 disc biopsy and L3 bone biopsy with IR 2/15 - Gram stain rare WBC. Cx pending.  -- consult for risk stratification and medical optimization. Appreciate assistance. RCRI 0, 3.9% risk of perioperative cardiac complication.   --Hold anti-plt/coag medications  --Monitor for urinary retention - voiding spontaneous. Bladder scan prn.  --Continue current regimen for pain control   --Continue to monitor clinically, notify NSGY immediately with any changes in neuro status    Dispo: pending surgical intervention

## 2022-02-17 PROBLEM — F12.20 CANNABIS USE DISORDER, MODERATE, DEPENDENCE: Status: ACTIVE | Noted: 2021-04-15

## 2022-02-17 PROBLEM — F33.0 MILD EPISODE OF RECURRENT MAJOR DEPRESSIVE DISORDER: Chronic | Status: ACTIVE | Noted: 2022-02-09

## 2022-02-17 LAB
ALBUMIN SERPL BCP-MCNC: 2.2 G/DL (ref 3.5–5.2)
ALP SERPL-CCNC: 112 U/L (ref 55–135)
ALT SERPL W/O P-5'-P-CCNC: 16 U/L (ref 10–44)
ANION GAP SERPL CALC-SCNC: 8 MMOL/L (ref 8–16)
AST SERPL-CCNC: 44 U/L (ref 10–40)
BASOPHILS # BLD AUTO: 0.02 K/UL (ref 0–0.2)
BASOPHILS NFR BLD: 0.8 % (ref 0–1.9)
BILIRUB SERPL-MCNC: 1.3 MG/DL (ref 0.1–1)
BUN SERPL-MCNC: 8 MG/DL (ref 6–20)
CALCIUM SERPL-MCNC: 8.6 MG/DL (ref 8.7–10.5)
CHLORIDE SERPL-SCNC: 108 MMOL/L (ref 95–110)
CO2 SERPL-SCNC: 24 MMOL/L (ref 23–29)
CREAT SERPL-MCNC: 0.6 MG/DL (ref 0.5–1.4)
DIFFERENTIAL METHOD: ABNORMAL
EOSINOPHIL # BLD AUTO: 0.1 K/UL (ref 0–0.5)
EOSINOPHIL NFR BLD: 2.5 % (ref 0–8)
ERYTHROCYTE [DISTWIDTH] IN BLOOD BY AUTOMATED COUNT: 18.1 % (ref 11.5–14.5)
EST. GFR  (AFRICAN AMERICAN): >60 ML/MIN/1.73 M^2
EST. GFR  (NON AFRICAN AMERICAN): >60 ML/MIN/1.73 M^2
GLUCOSE SERPL-MCNC: 93 MG/DL (ref 70–110)
HCT VFR BLD AUTO: 32.1 % (ref 37–48.5)
HGB BLD-MCNC: 9.8 G/DL (ref 12–16)
IMM GRANULOCYTES # BLD AUTO: 0.01 K/UL (ref 0–0.04)
IMM GRANULOCYTES NFR BLD AUTO: 0.4 % (ref 0–0.5)
INR PPP: 1.2 (ref 0.8–1.2)
LYMPHOCYTES # BLD AUTO: 0.6 K/UL (ref 1–4.8)
LYMPHOCYTES NFR BLD: 23.5 % (ref 18–48)
MCH RBC QN AUTO: 27.1 PG (ref 27–31)
MCHC RBC AUTO-ENTMCNC: 30.5 G/DL (ref 32–36)
MCV RBC AUTO: 89 FL (ref 82–98)
MONOCYTES # BLD AUTO: 0.2 K/UL (ref 0.3–1)
MONOCYTES NFR BLD: 9.1 % (ref 4–15)
NEUTROPHILS # BLD AUTO: 1.6 K/UL (ref 1.8–7.7)
NEUTROPHILS NFR BLD: 63.7 % (ref 38–73)
NRBC BLD-RTO: 0 /100 WBC
PLATELET # BLD AUTO: 67 K/UL (ref 150–450)
PMV BLD AUTO: 9.3 FL (ref 9.2–12.9)
POTASSIUM SERPL-SCNC: 3.9 MMOL/L (ref 3.5–5.1)
PROT SERPL-MCNC: 8 G/DL (ref 6–8.4)
PROTHROMBIN TIME: 12.6 SEC (ref 9–12.5)
RBC # BLD AUTO: 3.62 M/UL (ref 4–5.4)
SODIUM SERPL-SCNC: 140 MMOL/L (ref 136–145)
VANCOMYCIN TROUGH SERPL-MCNC: 15.5 UG/ML (ref 10–22)
WBC # BLD AUTO: 2.43 K/UL (ref 3.9–12.7)

## 2022-02-17 PROCEDURE — 99232 PR SUBSEQUENT HOSPITAL CARE,LEVL II: ICD-10-PCS | Mod: ,,, | Performed by: PHYSICIAN ASSISTANT

## 2022-02-17 PROCEDURE — 11000001 HC ACUTE MED/SURG PRIVATE ROOM

## 2022-02-17 PROCEDURE — 99232 SBSQ HOSP IP/OBS MODERATE 35: CPT | Mod: ,,, | Performed by: PHYSICIAN ASSISTANT

## 2022-02-17 PROCEDURE — 99223 PR INITIAL HOSPITAL CARE,LEVL III: ICD-10-PCS | Mod: AF,HB,, | Performed by: PSYCHIATRY & NEUROLOGY

## 2022-02-17 PROCEDURE — 80053 COMPREHEN METABOLIC PANEL: CPT

## 2022-02-17 PROCEDURE — 25000003 PHARM REV CODE 250: Performed by: STUDENT IN AN ORGANIZED HEALTH CARE EDUCATION/TRAINING PROGRAM

## 2022-02-17 PROCEDURE — 99223 1ST HOSP IP/OBS HIGH 75: CPT | Mod: AF,HB,, | Performed by: PSYCHIATRY & NEUROLOGY

## 2022-02-17 PROCEDURE — 99232 SBSQ HOSP IP/OBS MODERATE 35: CPT | Mod: ,,, | Performed by: INTERNAL MEDICINE

## 2022-02-17 PROCEDURE — 85025 COMPLETE CBC W/AUTO DIFF WBC: CPT | Performed by: STUDENT IN AN ORGANIZED HEALTH CARE EDUCATION/TRAINING PROGRAM

## 2022-02-17 PROCEDURE — 25000003 PHARM REV CODE 250: Performed by: NEUROLOGICAL SURGERY

## 2022-02-17 PROCEDURE — 63600175 PHARM REV CODE 636 W HCPCS: Performed by: PHYSICIAN ASSISTANT

## 2022-02-17 PROCEDURE — 80202 ASSAY OF VANCOMYCIN: CPT | Performed by: NEUROLOGICAL SURGERY

## 2022-02-17 PROCEDURE — 85610 PROTHROMBIN TIME: CPT

## 2022-02-17 PROCEDURE — 36415 COLL VENOUS BLD VENIPUNCTURE: CPT | Performed by: NEUROLOGICAL SURGERY

## 2022-02-17 PROCEDURE — 99232 PR SUBSEQUENT HOSPITAL CARE,LEVL II: ICD-10-PCS | Mod: ,,, | Performed by: INTERNAL MEDICINE

## 2022-02-17 PROCEDURE — 63600175 PHARM REV CODE 636 W HCPCS: Performed by: NEUROLOGICAL SURGERY

## 2022-02-17 PROCEDURE — 25000003 PHARM REV CODE 250

## 2022-02-17 RX ORDER — SERTRALINE HYDROCHLORIDE 50 MG/1
50 TABLET, FILM COATED ORAL DAILY
Status: DISCONTINUED | OUTPATIENT
Start: 2022-02-18 | End: 2022-03-15 | Stop reason: HOSPADM

## 2022-02-17 RX ADMIN — PREGABALIN 50 MG: 50 CAPSULE ORAL at 08:02

## 2022-02-17 RX ADMIN — CEFTRIAXONE SODIUM 2 G: 2 INJECTION, SOLUTION INTRAVENOUS at 03:02

## 2022-02-17 RX ADMIN — OXYCODONE 5 MG: 5 TABLET ORAL at 03:02

## 2022-02-17 RX ADMIN — OXYCODONE 5 MG: 5 TABLET ORAL at 08:02

## 2022-02-17 RX ADMIN — VANCOMYCIN HYDROCHLORIDE 2000 MG: 10 INJECTION, POWDER, LYOPHILIZED, FOR SOLUTION INTRAVENOUS at 04:02

## 2022-02-17 RX ADMIN — ACETAMINOPHEN 650 MG: 325 TABLET ORAL at 03:02

## 2022-02-17 RX ADMIN — PANTOPRAZOLE SODIUM 40 MG: 20 TABLET, DELAYED RELEASE ORAL at 08:02

## 2022-02-17 RX ADMIN — PREGABALIN 50 MG: 50 CAPSULE ORAL at 03:02

## 2022-02-17 RX ADMIN — OXYCODONE 5 MG: 5 TABLET ORAL at 02:02

## 2022-02-17 RX ADMIN — SERTRALINE HYDROCHLORIDE 25 MG: 25 TABLET ORAL at 08:02

## 2022-02-17 NOTE — HOSPITAL COURSE
RCRI 0. TTE with EF 60%, no endocarditis.  ID following for abx recs, started empiric vanc and ceftriaxone. Bcx NGTD. L3-4 intervertebral disc biopsy with IR, cx negative so far. Utox positive for amphetamines and methadone metabolites. Addiction psych consulted.

## 2022-02-17 NOTE — PROGRESS NOTES
Pharmacokinetic Assessment Follow Up: IV Vancomycin    Vancomycin serum concentration assessment(s):    The trough level was drawn correctly and can be used to guide therapy at this time. The measurement is within the desired definitive target range of 15 to 20 mcg/mL.    Vancomycin Regimen Plan:    Continue regimen to Vancomycin 2000 mg IV every 12 hours with next serum trough concentration measured at 1500  prior to 4th dose on 2/18    Drug levels (last 3 results):  Recent Labs   Lab Result Units 02/17/22  0301   Vancomycin-Trough ug/mL 15.5       Pharmacy will continue to follow and monitor vancomycin.    Please contact pharmacy at extension [phone] for questions regarding this assessment.    Thank you for the consult,   Kamille Costa       Patient brief summary:  Rachel Zazueta is a 41 y.o. female initiated on antimicrobial therapy with IV Vancomycin for treatment of bone/joint infection      Drug Allergies:   Review of patient's allergies indicates:   Allergen Reactions    Bee venom protein (honey bee) Anaphylaxis     Patient reports she is allergic to bee stings.    Wasp sting [allergen ext-venom-honey bee] Anaphylaxis    Adhesive Blisters    Strawberry Hives     Patient reports itching and hives    Iodine and iodide containing products Hives    Naproxen Other (See Comments)     Throat closing    Shellfish containing products     Nuts [tree nut] Rash       Actual Body Weight:   131.5 kg    Renal Function:   Estimated Creatinine Clearance: 174.5 mL/min (based on SCr of 0.6 mg/dL).     Dialysis Method (if applicable):  N/A    CBC (last 72 hours):  Recent Labs   Lab Result Units 02/14/22  1819 02/15/22  0501 02/16/22  0640 02/17/22  0301   WBC K/uL 5.97 4.91 2.81* 2.43*   Hemoglobin g/dL 10.0* 9.8* 9.7* 9.8*   Hematocrit % 32.4* 31.6* 31.0* 32.1*   Platelets K/uL 59* 57* 61* 67*   Gran % % 86.8* 82.3* 68.6 63.7   Lymph % % 6.7* 8.6* 17.8* 23.5   Mono % % 5.2 7.7 10.7 9.1   Eosinophil % % 0.8 0.8 1.8  2.5   Basophil % % 0.2 0.4 0.4 0.8   Differential Method  Automated Automated Automated Automated       Metabolic Panel (last 72 hours):  Recent Labs   Lab Result Units 02/14/22  1819 02/15/22  0501 02/16/22  0640 02/16/22  1206 02/17/22  0301   Sodium mmol/L 134* 132* 133*  --  140   Potassium mmol/L 4.3 4.1 3.7  --  3.9   Chloride mmol/L 103 102 104  --  108   CO2 mmol/L 27 25 23  --  24   Glucose mg/dL 102 75 130*  --  93   BUN mg/dL 16 12 10  --  8   Creatinine mg/dL 0.7 0.5 0.6  --  0.6   Creatinine, Urine mg/dL  --   --   --  34.0  --    Albumin g/dL 2.5*  --  2.1*  --  2.2*   Total Bilirubin mg/dL 2.1*  --  1.4*  --  1.3*   Alkaline Phosphatase U/L 130  --  111  --  112   AST U/L 40  --  35  --  44*   ALT U/L 14  --  11  --  16       Vancomycin Administrations:  vancomycin given in the last 96 hours                   vancomycin 2 g in dextrose 5 % 500 mL IVPB (mg) 2,000 mg New Bag 02/17/22 0423     2,000 mg New Bag 02/16/22 1600     2,000 mg New Bag  0515      Restarted 02/15/22 1755     2,000 mg New Bag  1559                Microbiologic Results:  Microbiology Results (last 7 days)     Procedure Component Value Units Date/Time    AFB Culture & Smear [[id number]] Collected: 02/15/22 1128    Order Status: Completed Specimen: Biopsy from Back Updated: 02/16/22 2127     AFB Culture & Smear Culture in progress     AFB CULTURE STAIN No acid fast bacilli seen.    Narrative:      L3-4 / L4-5 osteodiscitis    Blood Culture #2 **CANNOT BE ORDERED STAT** [[id number]] Collected: 02/14/22 1820    Order Status: Completed Specimen: Blood from Peripheral, Antecubital, Right Updated: 02/16/22 2012     Blood Culture, Routine No Growth to date      No Growth to date      No Growth to date    Blood Culture #1 **CANNOT BE ORDERED STAT** [[id number]] Collected: 02/14/22 1820    Order Status: Completed Specimen: Blood from Peripheral, Forearm, Right Updated: 02/16/22 2012     Blood Culture, Routine No Growth to date      No Growth to  date      No Growth to date    Aerobic culture [[id number]] Collected: 02/15/22 1128    Order Status: Completed Specimen: Biopsy from Back Updated: 02/16/22 0755     Aerobic Bacterial Culture No growth    Narrative:      L3-4 / L4-5 osteodiscitis    Culture, Anaerobic [[id number]] Collected: 02/15/22 1128    Order Status: Completed Specimen: Biopsy from Back Updated: 02/16/22 0647     Anaerobic Culture Culture in progress    Narrative:      L3-4 / L4-5 osteodiscitis    Gram stain [[id number]] Collected: 02/15/22 1128    Order Status: Completed Specimen: Biopsy from Back Updated: 02/15/22 1815     Gram Stain Result Rare WBC's      No organisms seen    Narrative:      L3-4 / L4-5 osteodiscitis    Fungus culture [[id number]] Collected: 02/15/22 1128    Order Status: Sent Specimen: Biopsy from Back Updated: 02/15/22 1414    Blood culture [[id number]] Collected: 02/14/22 2033    Order Status: Sent Specimen: Blood from Peripheral, Hand, Left Updated: 02/14/22 2034    Blood culture [[id number]] Collected: 02/14/22 2033    Order Status: Sent Specimen: Blood from Peripheral, Antecubital, Left Updated: 02/14/22 2034

## 2022-02-17 NOTE — PROGRESS NOTES
[facility] - Neurosurgery ([facility])  Neurosurgery  Progress Note    Subjective:     History of Present Illness: Rachel Zazueta is a 41 y.o.  female with PMHx of Hepatitis C, liver cirrhosis, pancytopenia, and polysubstance abuse, who presents to clinic for follow up with new imaging s/p L3 and L4 laminectomy for evacuation of epidural abscess and L3-L5 TLIF on 04/16/2021.     The pt c/o worsening back and left leg pain since October. She states that she is no longer using IV drugs. States only using marijuana. Describes the left leg pain as a shock down the leg when standing for 10 minutes. Denies taking any antibiotics. Endorses new weakness in the legs. Denies b/b dysfunction. Denies new neck pain. She ambulates with a walker at home. States that she smoke.    She presents to ED as direct admit from clinic.       Post-Op Info:  Procedure(s) (LRB):  **AIRO** FUSION, SPINE T10-PELVIS (N/A)         Interval History: NAEON. AFVSS. Back pain tolerable today. Reports leg pain with standing or sitting. XR scoliosis today.      Medications:  Continuous Infusions:    Scheduled Meds:   cefTRIAXone (ROCEPHIN) IVPB  2 g Intravenous Q24H    pantoprazole  40 mg Oral Daily    polyethylene glycol  17 g Oral Daily    pregabalin  50 mg Oral TID    [START ON 2/18/2022] sertraline  50 mg Oral Daily    vancomycin (VANCOCIN) IVPB  2,000 mg Intravenous Q12H     PRN Meds:acetaminophen, dextrose 10%, dextrose 10%, glucagon (human recombinant), glucose, glucose, glucose, HYDROmorphone, melatonin, oxyCODONE, Pharmacy to dose Vancomycin consult **AND** vancomycin - pharmacy to dose       Objective:     Weight: 131.5 kg (290 lb)  Body mass index is 45.42 kg/m².  Vital Signs (Most Recent):  Temp: 98.2 °F (36.8 °C) (02/17/22 1116)  Pulse: 90 (02/17/22 1116)  Resp: 18 (02/17/22 1501)  BP: 126/64 (02/17/22 1116)  SpO2: 96 % (02/17/22 1116) Vital Signs (24h Range):  Temp:  [98.2 °F (36.8 °C)-99 °F (37.2 °C)] 98.2 °F (36.8 °C)  Pulse:   [79-92] 90  Resp:  [18-20] 18  SpO2:  [94 %-100 %] 96 %  BP: (112-133)/(57-65) 126/64                     Female External Urinary Catheter 02/14/22 2300 (Active)   Output (mL) 650 mL 02/15/22 0645       Physical Exam      Neurosurgery Physical Exam      General: well developed, well nourished, no distress.   Head: normocephalic, atraumatic  Neck: No tracheal deviation.   Neurologic: Alert and oriented. Thought content appropriate.  GCS: Motor: 6/Verbal: 5/Eyes: 4 GCS Total: 15  Mental Status: Awake, Alert, Oriented x 4  Language: No aphasia  Speech: No dysarthria  Cranial nerves: face symmetric, tongue midline, CN II-XII grossly intact, EOMI.   Pulmonary: normal respirations, no signs of respiratory distress  Abdomen: soft, non-distended, not tender to palpation  Sensory: intact to light touch throughout  Motor Strength: Moves all extremities spontaneously with good tone.  Pain limited weakness to proximal LLE. Full strength upper and lower extremities. No abnormal movements seen.     Strength  Deltoids Triceps Biceps Wrist Extension Wrist Flexion Hand    Upper: R 5/5 5/5 5/5 5/5 5/5 5/5    L 5/5 5/5 5/5 5/5 5/5 5/5     Iliopsoas Quadriceps Knee  Flexion Tibialis  anterior Gastro- cnemius EHL   Lower: R 5/5 5/5 5/5 5/5 5/5 5/5    L 4+/5 4+/5 4+/5 5/5 5/5 5/5     Skin: Skin is warm, dry and intact.        Significant Labs:  Recent Labs   Lab 02/16/22  0640 02/17/22  0301   * 93   * 140   K 3.7 3.9    108   CO2 23 24   BUN 10 8   CREATININE 0.6 0.6   CALCIUM 8.0* 8.6*     Recent Labs   Lab 02/16/22 0640 02/17/22  0301   WBC 2.81* 2.43*   HGB 9.7* 9.8*   HCT 31.0* 32.1*   PLT 61* 67*     Recent Labs   Lab 02/16/22  0640 02/17/22  0301   INR 1.3* 1.2     Microbiology Results (last 7 days)     Procedure Component Value Units Date/Time    Aerobic culture [[id number]] Collected: 02/15/22 1128    Order Status: Completed Specimen: Biopsy from Back Updated: 02/17/22 1404     Aerobic Bacterial Culture No  growth    Narrative:      L3-4 / L4-5 osteodiscitis    Culture, Anaerobic [[id number]] Collected: 02/15/22 1128    Order Status: Completed Specimen: Biopsy from Back Updated: 02/17/22 0739     Anaerobic Culture Culture in progress    Narrative:      L3-4 / L4-5 osteodiscitis    AFB Culture & Smear [[id number]] Collected: 02/15/22 1128    Order Status: Completed Specimen: Biopsy from Back Updated: 02/16/22 2127     AFB Culture & Smear Culture in progress     AFB CULTURE STAIN No acid fast bacilli seen.    Narrative:      L3-4 / L4-5 osteodiscitis    Blood Culture #2 **CANNOT BE ORDERED STAT** [[id number]] Collected: 02/14/22 1820    Order Status: Completed Specimen: Blood from Peripheral, Antecubital, Right Updated: 02/16/22 2012     Blood Culture, Routine No Growth to date      No Growth to date      No Growth to date    Blood Culture #1 **CANNOT BE ORDERED STAT** [[id number]] Collected: 02/14/22 1820    Order Status: Completed Specimen: Blood from Peripheral, Forearm, Right Updated: 02/16/22 2012     Blood Culture, Routine No Growth to date      No Growth to date      No Growth to date    Gram stain [[id number]] Collected: 02/15/22 1128    Order Status: Completed Specimen: Biopsy from Back Updated: 02/15/22 1815     Gram Stain Result Rare WBC's      No organisms seen    Narrative:      L3-4 / L4-5 osteodiscitis    Fungus culture [[id number]] Collected: 02/15/22 1128    Order Status: Sent Specimen: Biopsy from Back Updated: 02/15/22 1414    Blood culture [[id number]] Collected: 02/14/22 2033    Order Status: Sent Specimen: Blood from Peripheral, Hand, Left Updated: 02/14/22 2034    Blood culture [[id number]] Collected: 02/14/22 2033    Order Status: Sent Specimen: Blood from Peripheral, Antecubital, Left Updated: 02/14/22 2034        Recent Lab Results       02/17/22  0301        Albumin 2.2       Alkaline Phosphatase 112       ALT 16       Anion Gap 8       AST 44       Baso # 0.02       Basophil % 0.8       BILIRUBIN  TOTAL 1.3  Comment: For infants and newborns, interpretation of results should be based  on gestational age, weight and in agreement with clinical  observations.    Premature Infant recommended reference ranges:  Up to 24 hours.............<8.0 mg/dL  Up to 48 hours............<12.0 mg/dL  3-5 days..................<15.0 mg/dL  6-29 days.................<15.0 mg/dL         BUN 8       Calcium 8.6       Chloride 108       CO2 24       Creatinine 0.6       Differential Method Automated       eGFR if  >60.0       eGFR if non  >60.0  Comment: Calculation used to obtain the estimated glomerular filtration  rate (eGFR) is the CKD-EPI equation.          Eos # 0.1       Eosinophil % 2.5       Glucose 93       Gran # (ANC) 1.6       Gran % 63.7       Hematocrit 32.1       Hemoglobin 9.8       Immature Grans (Abs) 0.01  Comment: Mild elevation in immature granulocytes is non specific and   can be seen in a variety of conditions including stress response,   acute inflammation, trauma and pregnancy. Correlation with other   laboratory and clinical findings is essential.         Immature Granulocytes 0.4       INR 1.2  Comment: Coumadin Therapy:  2.0 - 3.0 for INR for all indicators except mechanical heart valves  and antiphospholipid syndromes which should use 2.5 - 3.5.         Lymph # 0.6       Lymph % 23.5       MCH 27.1       MCHC 30.5       MCV 89       Mono # 0.2       Mono % 9.1       MPV 9.3       nRBC 0       Platelets 67       Potassium 3.9       PROTEIN TOTAL 8.0       Protime 12.6       RBC 3.62       RDW 18.1       Sodium 140       Vancomycin-Trough 15.5       WBC 2.43           All pertinent labs from the last 24 hours have been reviewed.    Significant Diagnostics:  I have reviewed all pertinent imaging results/findings within the past 24 hours.        Assessment/Plan:     Spondylodiscitis  Rachel Zazueta is a 41 F with PMH hepatitis C, cirrhosis, pancytopenia, nicotine abuse,  hx IVDU (last use 2 years ago), strep agalactae bacteremia, s/p L3-5 TLIF on 4/16/2021 who presents with persistent and progressive lumbar spondylodiscitis with resultant hardware failure and new scoliosis.     --Patient admitted to NPU on telemetry;       -q4h neurochecks on floor  --Plan for OR 2/21 for hardware removal, washout, extension of previous fusion.  --XR scoliosis today  --XR lumbar spine flex/ext completed.   --All labs and diagnostics reviewed  --Follow-up MRI Brain, C/T/L w/wo contrast   -No evidence of osteomyelitis, discitis, or epidural abscess within the cervical or thoracic spine   -No diffusion positive or abnormal enhancement within the brain   -MRI brain shows patchy nonspecific increased T2 and FLAIR signal in the periventricular and subcortical white matter bilaterally. Could be due to chronic microvascular ischemic changes vs demyelinating disease given patient's age. No evidence of active demyelination.   -DDD C4-7 with mild to moderate canal stenosis  --Full infectious w/u    -ESR 57, CRP 29, Procal 0.05, Lactate 1.1, BCx 2/14 NGTD, UA/Ucx pending   -F/u ID recs - started on empiric abx, continue.  --S/p L3-4 disc biopsy and L3 bone biopsy with IR 2/15 - Gram stain rare WBC. Cx NGTD.  -- consult for risk stratification and medical optimization. Appreciate assistance. RCRI 0, 3.9% risk of perioperative cardiac complication.   --Utox + for amphetamines, addiction psych consulted per .   --Hold anti-plt/coag medications  --Monitor for urinary retention - voiding spontaneous. Bladder scan prn.  --Continue current regimen for pain control   --Continue to monitor clinically, notify NSGY immediately with any changes in neuro status    Dispo: pending surgical intervention         Lazara Yost PA-C  Neurosurgery  [facility] - Neurosurgery ([facility])

## 2022-02-17 NOTE — ASSESSMENT & PLAN NOTE
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 Pt denies ever receiving treatment for HCV. Pt reports previous liver biopsy, but no records of this available. She has been referred to hepatology previously but did not follow up.   MELD-Na score: 9 at 2/17/2022  3:01 AM  MELD score: 9 at 2/17/2022  3:01 AM  Calculated from:  Serum Creatinine: 0.6 mg/dL (Using min of 1 mg/dL) at 2/17/2022  3:01 AM  Serum Sodium: 140 mmol/L (Using max of 137 mmol/L) at 2/17/2022  3:01 AM  Total Bilirubin: 1.3 mg/dL at 2/17/2022  3:01 AM  INR(ratio): 1.2 at 2/17/2022  3:01 AM  Age: 41 years    Recommendations:  - U/S abdomen with doppler showed cirrhosis and portal hypertension satisfactory Doppler evaluation   - daily CMP and INR  - will need hepatology referral on discharge   - lipase WNL, RUQ/epigastric pain resolved after BM

## 2022-02-17 NOTE — ASSESSMENT & PLAN NOTE
Rachel Zazueta is a 41 F with PMH hepatitis C, cirrhosis, pancytopenia, nicotine abuse, hx IVDU (last use 2 years ago), strep agalactae bacteremia, s/p L3-5 TLIF on 4/16/2021 who presents with persistent and progressive lumbar spondylodiscitis with resultant hardware failure and new scoliosis.     --Patient admitted to NPU on telemetry;       -q4h neurochecks on floor  --Plan for OR 2/21 for hardware removal, washout, extension of previous fusion.  --XR scoliosis today  --XR lumbar spine flex/ext completed.   --All labs and diagnostics reviewed  --Follow-up MRI Brain, C/T/L w/wo contrast   -No evidence of osteomyelitis, discitis, or epidural abscess within the cervical or thoracic spine   -No diffusion positive or abnormal enhancement within the brain   -MRI brain shows patchy nonspecific increased T2 and FLAIR signal in the periventricular and subcortical white matter bilaterally. Could be due to chronic microvascular ischemic changes vs demyelinating disease given patient's age. No evidence of active demyelination.   -DDD C4-7 with mild to moderate canal stenosis  --Full infectious w/u    -ESR 57, CRP 29, Procal 0.05, Lactate 1.1, BCx 2/14 NGTD, UA/Ucx pending   -F/u ID recs - started on empiric abx, continue.  --S/p L3-4 disc biopsy and L3 bone biopsy with IR 2/15 - Gram stain rare WBC. Cx NGTD.  -- consult for risk stratification and medical optimization. Appreciate assistance. RCRI 0, 3.9% risk of perioperative cardiac complication.   --Utox + for amphetamines, addiction psych consulted per .   --Hold anti-plt/coag medications  --Monitor for urinary retention - voiding spontaneous. Bladder scan prn.  --Continue current regimen for pain control   --Continue to monitor clinically, notify NSGY immediately with any changes in neuro status    Dispo: pending surgical intervention

## 2022-02-17 NOTE — ASSESSMENT & PLAN NOTE
Hx of epidural abscess 4/2021 with blood and surgical cultures positive for group B strep.  Echo 2/15 negative for endocarditis. ID following for antibiotic recs.     Recommendations:  - blood cultures NGTD  - biopsy gram stain negative, cultures and pathology pending   - urine tox screen positive for amphetamines, methadone; received opiates and benzos while admitted  - addiction psych consulted   - continue antibiotics per ID recs

## 2022-02-17 NOTE — NURSING
Plan of care reviewed with patient at this time. Fall and aspiration precautions maintained throughout shift. PIV present and maintained. No neuro changes during shift. Patient is resting comfortably. VSS. Wctm.

## 2022-02-17 NOTE — SUBJECTIVE & OBJECTIVE
Interval History: NAEON. AFVSS. Back pain tolerable today. Reports leg pain with standing or sitting. XR scoliosis today.      Medications:  Continuous Infusions:    Scheduled Meds:   cefTRIAXone (ROCEPHIN) IVPB  2 g Intravenous Q24H    pantoprazole  40 mg Oral Daily    polyethylene glycol  17 g Oral Daily    pregabalin  50 mg Oral TID    [START ON 2/18/2022] sertraline  50 mg Oral Daily    vancomycin (VANCOCIN) IVPB  2,000 mg Intravenous Q12H     PRN Meds:acetaminophen, dextrose 10%, dextrose 10%, glucagon (human recombinant), glucose, glucose, glucose, HYDROmorphone, melatonin, oxyCODONE, Pharmacy to dose Vancomycin consult **AND** vancomycin - pharmacy to dose       Objective:     Weight: 131.5 kg (290 lb)  Body mass index is 45.42 kg/m².  Vital Signs (Most Recent):  Temp: 98.2 °F (36.8 °C) (02/17/22 1116)  Pulse: 90 (02/17/22 1116)  Resp: 18 (02/17/22 1501)  BP: 126/64 (02/17/22 1116)  SpO2: 96 % (02/17/22 1116) Vital Signs (24h Range):  Temp:  [98.2 °F (36.8 °C)-99 °F (37.2 °C)] 98.2 °F (36.8 °C)  Pulse:  [79-92] 90  Resp:  [18-20] 18  SpO2:  [94 %-100 %] 96 %  BP: (112-133)/(57-65) 126/64                     Female External Urinary Catheter 02/14/22 2300 (Active)   Output (mL) 650 mL 02/15/22 0645       Physical Exam      Neurosurgery Physical Exam      General: well developed, well nourished, no distress.   Head: normocephalic, atraumatic  Neck: No tracheal deviation.   Neurologic: Alert and oriented. Thought content appropriate.  GCS: Motor: 6/Verbal: 5/Eyes: 4 GCS Total: 15  Mental Status: Awake, Alert, Oriented x 4  Language: No aphasia  Speech: No dysarthria  Cranial nerves: face symmetric, tongue midline, CN II-XII grossly intact, EOMI.   Pulmonary: normal respirations, no signs of respiratory distress  Abdomen: soft, non-distended, not tender to palpation  Sensory: intact to light touch throughout  Motor Strength: Moves all extremities spontaneously with good tone.  Pain limited weakness to  proximal LLE. Full strength upper and lower extremities. No abnormal movements seen.     Strength  Deltoids Triceps Biceps Wrist Extension Wrist Flexion Hand    Upper: R 5/5 5/5 5/5 5/5 5/5 5/5    L 5/5 5/5 5/5 5/5 5/5 5/5     Iliopsoas Quadriceps Knee  Flexion Tibialis  anterior Gastro- cnemius EHL   Lower: R 5/5 5/5 5/5 5/5 5/5 5/5    L 4+/5 4+/5 4+/5 5/5 5/5 5/5     Skin: Skin is warm, dry and intact.        Significant Labs:  Recent Labs   Lab 02/16/22  0640 02/17/22  0301   * 93   * 140   K 3.7 3.9    108   CO2 23 24   BUN 10 8   CREATININE 0.6 0.6   CALCIUM 8.0* 8.6*     Recent Labs   Lab 02/16/22  0640 02/17/22  0301   WBC 2.81* 2.43*   HGB 9.7* 9.8*   HCT 31.0* 32.1*   PLT 61* 67*     Recent Labs   Lab 02/16/22  0640 02/17/22  0301   INR 1.3* 1.2     Microbiology Results (last 7 days)     Procedure Component Value Units Date/Time    Aerobic culture [[id number]] Collected: 02/15/22 1128    Order Status: Completed Specimen: Biopsy from Back Updated: 02/17/22 1409     Aerobic Bacterial Culture No growth    Narrative:      L3-4 / L4-5 osteodiscitis    Culture, Anaerobic [[id number]] Collected: 02/15/22 1128    Order Status: Completed Specimen: Biopsy from Back Updated: 02/17/22 0739     Anaerobic Culture Culture in progress    Narrative:      L3-4 / L4-5 osteodiscitis    AFB Culture & Smear [[id number]] Collected: 02/15/22 1128    Order Status: Completed Specimen: Biopsy from Back Updated: 02/16/22 2127     AFB Culture & Smear Culture in progress     AFB CULTURE STAIN No acid fast bacilli seen.    Narrative:      L3-4 / L4-5 osteodiscitis    Blood Culture #2 **CANNOT BE ORDERED STAT** [[id number]] Collected: 02/14/22 1820    Order Status: Completed Specimen: Blood from Peripheral, Antecubital, Right Updated: 02/16/22 2012     Blood Culture, Routine No Growth to date      No Growth to date      No Growth to date    Blood Culture #1 **CANNOT BE ORDERED STAT** [[id number]] Collected: 02/14/22 1821     Order Status: Completed Specimen: Blood from Peripheral, Forearm, Right Updated: 02/16/22 2012     Blood Culture, Routine No Growth to date      No Growth to date      No Growth to date    Gram stain [[id number]] Collected: 02/15/22 1128    Order Status: Completed Specimen: Biopsy from Back Updated: 02/15/22 1815     Gram Stain Result Rare WBC's      No organisms seen    Narrative:      L3-4 / L4-5 osteodiscitis    Fungus culture [[id number]] Collected: 02/15/22 1128    Order Status: Sent Specimen: Biopsy from Back Updated: 02/15/22 1414    Blood culture [[id number]] Collected: 02/14/22 2033    Order Status: Sent Specimen: Blood from Peripheral, Hand, Left Updated: 02/14/22 2034    Blood culture [[id number]] Collected: 02/14/22 2033    Order Status: Sent Specimen: Blood from Peripheral, Antecubital, Left Updated: 02/14/22 2034        Recent Lab Results       02/17/22  0301        Albumin 2.2       Alkaline Phosphatase 112       ALT 16       Anion Gap 8       AST 44       Baso # 0.02       Basophil % 0.8       BILIRUBIN TOTAL 1.3  Comment: For infants and newborns, interpretation of results should be based  on gestational age, weight and in agreement with clinical  observations.    Premature Infant recommended reference ranges:  Up to 24 hours.............<8.0 mg/dL  Up to 48 hours............<12.0 mg/dL  3-5 days..................<15.0 mg/dL  6-29 days.................<15.0 mg/dL         BUN 8       Calcium 8.6       Chloride 108       CO2 24       Creatinine 0.6       Differential Method Automated       eGFR if  >60.0       eGFR if non  >60.0  Comment: Calculation used to obtain the estimated glomerular filtration  rate (eGFR) is the CKD-EPI equation.          Eos # 0.1       Eosinophil % 2.5       Glucose 93       Gran # (ANC) 1.6       Gran % 63.7       Hematocrit 32.1       Hemoglobin 9.8       Immature Grans (Abs) 0.01  Comment: Mild elevation in immature granulocytes is non  specific and   can be seen in a variety of conditions including stress response,   acute inflammation, trauma and pregnancy. Correlation with other   laboratory and clinical findings is essential.         Immature Granulocytes 0.4       INR 1.2  Comment: Coumadin Therapy:  2.0 - 3.0 for INR for all indicators except mechanical heart valves  and antiphospholipid syndromes which should use 2.5 - 3.5.         Lymph # 0.6       Lymph % 23.5       MCH 27.1       MCHC 30.5       MCV 89       Mono # 0.2       Mono % 9.1       MPV 9.3       nRBC 0       Platelets 67       Potassium 3.9       PROTEIN TOTAL 8.0       Protime 12.6       RBC 3.62       RDW 18.1       Sodium 140       Vancomycin-Trough 15.5       WBC 2.43           All pertinent labs from the last 24 hours have been reviewed.    Significant Diagnostics:  I have reviewed all pertinent imaging results/findings within the past 24 hours.

## 2022-02-17 NOTE — ASSESSMENT & PLAN NOTE
Hx of bone marrow biopsy 2017 that was nondiagnostic.     Recommendations:  - daily labs  - transfuse for Hgb <7  - transfuse for plt <10 or <50 with bleeding
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
## 2022-02-17 NOTE — PLAN OF CARE
Problem: Adult Inpatient Plan of Care  Goal: Plan of Care Review  Outcome: Ongoing, Progressing     Problem: Fall Injury Risk  Goal: Absence of Fall and Fall-Related Injury  Outcome: Ongoing, Progressing    POC reviewed with patient. Patient verbalized understanding of POC. All comments and concerns addressed. Pain managed with PRN oxycodone.  Bed locked and low. Bed alarm set, call light within reach. Safety precautions maintained.

## 2022-02-17 NOTE — MEDICAL/APP STUDENT
2/17/2022 11:46 AM  Rachel Zazueta  1980  [id number]      ADDICTION CONSULT INITIAL EVALUATION     DEPARTMENT:  Psychiatry  SITE: [facility], [address]    DATE OF ADMISSION: 2/14/2022  5:21 PM  LENGTH OF STAY: 3 days    EXAMINING PRACTITIONER: Bhavna Dick    CONSULT REQUESTED BY: Guille Templeton MD      SUBJECTIVE     CHIEF COMPLAINT  Rachel Zazueta is a 41 y.o. female who is seen today for an initial psychiatric evaluation by the addiction psychiatry consult service.  Rachel Zazueta presents with the chief complaint of back pain      HISTORY OF PRESENT ILLNESS    Per Primary MD:  Rachel Zazueta is a 42 y/o F with a PMH of cirrhosis, Hep C, anemia, polysubstance abuse, and epidural abscess s/p L3-L4 laminectomy and L3-L5 TLIF (4/2021; blood and surgical cultures positive for group B strep) who was admitted from NSGY clinic for workup of severe back pain and LE weakness. She reports that she has had worsening pain in her lower back and hips since October. She also reports shooting pain down her left leg. She ambulates with a walker at home due to the pain and difficulty with balance. She denies bowel or bladder incontinence. She denies CP, SOB, nausea, vomiting. Denies hx of HTN or DM. She was lost to follow up after her previous surgery, stating that she did not go to her appointments because she was feeling good. She states that her last IV meth use was 8/2020, and last meth use was after her previous surgery 4/2021 (however tox screen 7/2021 positive for amphetamines). She reports marijuana use approx 3x per week and smokes cigarettes 1/2 pack per day. She denies use of other substances including cocaine. MRI C/T/L spine showed suspected discitis/osteomyelitis at L3-4 and L4-5 with probable hardware infection. L3-4 disc biopsy obtained by IR. Hospital medicine was consulted for preop risk stratification and medical optimization.     Per Addiction Psych MD:  On presentation  "pt is alert and laying in bed. Pt does not appear to be in any acute distress.Pt has past medical history of back surgery in April 2021 but was experiencing back pain for several weeks before she was able to attend a doctor's appointment. Pt was told "hardware failed" and that she will be needing surgery. Pt describes back pain as "similar to when I had a stent taken out after I had kidney stones".     Pt reports using marijuana regularly- 3 times per week. Pt says that she hadn't used methamphetamines since around May 2021 after her back surgery until last week when she "slipped up" and smoked methamphetamines as two friends brought it to her house and says "it was right there". Pt also reports obtaining "pain pills" such as methadone from friends in order to help her back pain "last week and the week before". Pt has used heroin in the past and has past history of IVDU. Pt reports she has not engaged in IVDU for over a year.      Pt reports that she does not feel a need to use methamphetamines and that if she avoids certain people she will not use it. Pt also reports that she does not enjoy using pain pills and "hates it" but only obtained them because her doctor's appointment regarding her back pain was scheduled for 1 month away and she needed something to help her back. Pt therefore feels that attending rehab is not necessary and is not interested in pursuing this path.     Pt currently lives with boyfriend who uses marijuana regularly. Pt reports both parents were drug addicts and alcoholics.     Pt was prescribed Zoloft by PCP 2 weeks ago for depression. Pt says she has been feeling depressed "about her back" and becomes tearful when talking about her back pain and how it has affected her.    Pt reports decreased ability to sleep and normal appetite. Pt has previously been prescribed Prozac and Effexor for depression. Pt denies SI/HI.     Interview was not completed because pt became tearful and requested to " "end interview.     COLLATERAL  ***    SUBSTANCE ABUSE HISTORY  Substance(s) of Choice: Methamphetamines, Marijuana  Substances Used: Heroin, prescription pain medicine  History of IVDU?: yes, but has not been IVDU for "over a year since August)   Use of Alcohol: no   Average Consumption: Marijuana 3x/week  Last Drink: n/a   Use of Medications for Alcohol/Opioid Use Disorder: no, but gets methadone and pain pills from friends   History of Complicated Withdrawal: no  History of Detox: no  Rehab History: no  AA/NA involvement: no  Tobacco: ***  Spouse/Partner Consumption: boyfriend smokes marijuana  Patient Aware of Biomedical Complications: ***    DSM-5 SUBSTANCE USE DISORDER CRITERIA   Mild (1-3), Moderate (4-5), Severe (?6)  1. Often take in larger amounts or over a longer period of time than was intended.  2. Persistent desire or unsuccessful efforts to cut down or control use.  3. Great deal of time spent in activities necessary to obtain substance, use, or recover from effects.  4. Craving/strong desire for substance or urge to use.  5. Use resulting in failure to fulfill major role obligations at home, work or school.  6. Social, occupational, recreational activities decreased because of use.  7. Continued use despite having persistent or recurrent social or interpersonal problems cause or exaserbated by the substance.  8. Recurrent use in situations in which it is physically hazardous.  9. Use despite physical or psychological problems that are likely to have been caused or exacerbated by the substance.  10. Tolerance, as defined by either of the following.   A. A need for markedly increased amounts of substance to achieve intoxication or desired effect. -OR-    B. A markedly diminished effect with continued use of the same amount of substance.  11. Withdrawal, as manifested by the following.   A. The characteristic withdrawal syndrome for substance. -AND-   B. Substance is taken to relieve or avoid withdrawal " symptoms.    ARE THE CRITERIA MET FOR DSM-5 SUBSTANCE USE DISORDER: ***      PSYCHIATRIC HISTORY  Reported Diagnose(s): Depression  Previous Medication Trials: Effexor, Prozac  Previous Psychiatric Hospitalizations: no  Outpatient Psychiatrist?: none      SUICIDE/HOMICIDE RISK ASSESSMENT  Current/active suicidal ideation/plan/intent: no  Previous suicide attempts: no  Current/active homicidal ideation/plan/intent: no      HISTORY OF TRAUMA, ABUSE & VIOLENCE  Trauma: both parents were drug addicts and alcoholics   Physical Abuse: ***  Sexual Abuse: ***  Violent Conduct: ***    Access to Gun: ***       FAMILY PSYCHIATRIC HISTORY   Pt reports that both parents were addicted to drugs and were alcoholics       SOCIAL HISTORY  Currently lives with boyfriend       PAST MEDICAL HISTORY   ***      PSYCHOSOCIAL FACTORS  Stressors (Psychosocial and Environmental): { :[id number]}.       PSYCHIATRIC ROS  Patient confirms symptoms of depression- decreased sleep, low mood     MEDICAL ROS    Complete review of systems performed covering Constitutional, Eyes, ENT/Mouth, Cardiovascular, Respiratory, Gastrointestinal, Genitourinary, Musculoskeletal, Skin, Neurologic, Endocrine, Heme/Lymph, and Allergy/Immune.     Complete review of systems was negative with the exception of the following positive symptoms: ***      ALLERGIES  Bee venom protein (honey bee), Wasp sting [allergen ext-venom-honey bee], Adhesive, Strawberry, Iodine and iodide containing products, Naproxen, Shellfish containing products, and Nuts [tree nut]      MEDICATIONS    Psychotropics:  Zoloft- prescribed by PCP, has been taking for 2 weeks     Infusions:      Scheduled:   cefTRIAXone (ROCEPHIN) IVPB  2 g Intravenous Q24H    pantoprazole  40 mg Oral Daily    polyethylene glycol  17 g Oral Daily    pregabalin  50 mg Oral TID    sertraline  25 mg Oral Daily    vancomycin (VANCOCIN) IVPB  2,000 mg Intravenous Q12H       PRN:  acetaminophen, dextrose 10%, dextrose 10%,  glucagon (human recombinant), glucose, glucose, glucose, HYDROmorphone, melatonin, oxyCODONE, Pharmacy to dose Vancomycin consult **AND** vancomycin - pharmacy to dose    Home Medications:  Prior to Admission medications    Medication Sig Start Date End Date Taking? Authorizing Provider   diclofenac sodium (VOLTAREN) 1 % Gel Apply 2 g topically 4 (four) times daily. 12/31/21   Femi Galicia NP   polyethylene glycol (GLYCOLAX) 17 gram/dose powder Take 17 g by mouth once daily. 1/26/22   Dannielle Merino DO   pregabalin (LYRICA) 50 MG capsule Take 1 capsule (50 mg total) by mouth 3 (three) times daily. 1/3/22 7/4/22  Susannah Mackenzie PA-C   sertraline (ZOLOFT) 25 MG tablet Take 1 tablet (25 mg total) by mouth once daily. 2/9/22 3/11/22  Dannielle Merino DO         OBJECTIVE:     EXAMINATION    Vitals:    02/17/22 0334 02/17/22 0732 02/17/22 0838 02/17/22 1116   BP: 113/62 133/60  126/64   BP Location: Right arm Left arm  Left arm   Patient Position: Lying Lying  Lying   Pulse: 79 85  90   Resp: 18 18 18 18   Temp: 98.5 °F (36.9 °C) 99 °F (37.2 °C)  98.2 °F (36.8 °C)   TempSrc: Oral Oral  Oral   SpO2: 100% 99%  96%   Weight:       Height:           PAIN   ***/10    CONSTITUTIONAL  General Appearance and Manner: ***    MUSCULOSKELETAL  Abnormal Involuntary Movements: ***  Muscle Strength and Tone:  ***  Gait and Station: ***    PSYCHIATRIC   Orientation: Pt is oriented to person, place, time   Speech: normal, linear speech   Language: fluent in english   Mood: depressed mood   Affect: tearful, depressed   Thought Process: linear, logical   Associations: no loose associations  Thought Content: normal, linear, logical   Memory: ***  Attention and Concentration: ***  Fund of Knowledge: ***  Insight: fair  Judgment: good       ASSESSMENT:     DIAGNOSES & PROBLEMS    ***      STRENGTHS AND LIABILITIES   Strength: Patient is expressive/articulate., Liability: Patient has poor health.      MOTIVATION TO PURSUE  RECOVERY: low    ACCEPTANCE OF ADDICTION: poor    ABILITY TO ADHERE TO TREATMENT PLAN: low      PLAN:       MANAGEMENT PLAN, TREATMENT GOALS, THERAPEUTIC TECHNIQUES/APPROACHES & CLINICAL REASONING              · Patient counseled on abstinence from alcohol and substances of abuse (illicit and prescription).  · Additional workup planned to address substance use disorder, in order to guide and refine ongoing management options, includes serial alcohol and drug laboratory testing (e.g. PETH, urine toxicology).  · Relapse prevention and motivational interviewing provided.  · Education provided on 12 step recovery programs.      PRESCRIPTION DRUG MANAGEMENT  - The risks and benefits of medication were discussed with this patient.  - Possible expectable adverse effects of any current or proposed individual psychotropic agents were discussed with this patient.  - Counseling was provided on the importance of full compliance with medication regimens.      In cases of emergency, daily coverage provided by Acute/ER Psych MD, NP, or SW, with contact numbers located in [facility] On Call Schedule    Case discussed with staff addiction psychiatrist: GLADIS JIMENEZ***, MD      LABS/IMAGING/STUDIES   Recent Results (from the past 24 hour(s))   Toxicology screen, urine    Collection Time: 02/16/22 12:06 PM   Result Value Ref Range    Alcohol, Urine <10 <10 mg/dL    Benzodiazepines Presumptive Positive (A) Negative    Methadone metabolites Presumptive Positive (A) Negative    Cocaine (Metab.) Negative Negative    Opiate Scrn, Ur Presumptive Positive (A) Negative    Barbiturate Screen, Ur Negative Negative    Amphetamine Screen, Ur Presumptive Positive (A) Negative    THC Negative Negative    Phencyclidine Negative Negative    Creatinine, Urine 34.0 15.0 - 325.0 mg/dL    Toxicology Information SEE COMMENT    Lipase    Collection Time: 02/16/22  1:23 PM   Result Value Ref Range    Lipase 37 4 - 60 U/L   CBC auto differential     Collection Time: 02/17/22  3:01 AM   Result Value Ref Range    WBC 2.43 (L) 3.90 - 12.70 K/uL    RBC 3.62 (L) 4.00 - 5.40 M/uL    Hemoglobin 9.8 (L) 12.0 - 16.0 g/dL    Hematocrit 32.1 (L) 37.0 - 48.5 %    MCV 89 82 - 98 fL    MCH 27.1 27.0 - 31.0 pg    MCHC 30.5 (L) 32.0 - 36.0 g/dL    RDW 18.1 (H) 11.5 - 14.5 %    Platelets 67 (L) 150 - 450 K/uL    MPV 9.3 9.2 - 12.9 fL    Immature Granulocytes 0.4 0.0 - 0.5 %    Gran # (ANC) 1.6 (L) 1.8 - 7.7 K/uL    Immature Grans (Abs) 0.01 0.00 - 0.04 K/uL    Lymph # 0.6 (L) 1.0 - 4.8 K/uL    Mono # 0.2 (L) 0.3 - 1.0 K/uL    Eos # 0.1 0.0 - 0.5 K/uL    Baso # 0.02 0.00 - 0.20 K/uL    nRBC 0 0 /100 WBC    Gran % 63.7 38.0 - 73.0 %    Lymph % 23.5 18.0 - 48.0 %    Mono % 9.1 4.0 - 15.0 %    Eosinophil % 2.5 0.0 - 8.0 %    Basophil % 0.8 0.0 - 1.9 %    Differential Method Automated    Comprehensive metabolic panel    Collection Time: 02/17/22  3:01 AM   Result Value Ref Range    Sodium 140 136 - 145 mmol/L    Potassium 3.9 3.5 - 5.1 mmol/L    Chloride 108 95 - 110 mmol/L    CO2 24 23 - 29 mmol/L    Glucose 93 70 - 110 mg/dL    BUN 8 6 - 20 mg/dL    Creatinine 0.6 0.5 - 1.4 mg/dL    Calcium 8.6 (L) 8.7 - 10.5 mg/dL    Total Protein 8.0 6.0 - 8.4 g/dL    Albumin 2.2 (L) 3.5 - 5.2 g/dL    Total Bilirubin 1.3 (H) 0.1 - 1.0 mg/dL    Alkaline Phosphatase 112 55 - 135 U/L    AST 44 (H) 10 - 40 U/L    ALT 16 10 - 44 U/L    Anion Gap 8 8 - 16 mmol/L    eGFR if African American >60.0 >60 mL/min/1.73 m^2    eGFR if non African American >60.0 >60 mL/min/1.73 m^2   Protime-INR    Collection Time: 02/17/22  3:01 AM   Result Value Ref Range    Prothrombin Time 12.6 (H) 9.0 - 12.5 sec    INR 1.2 0.8 - 1.2   VANCOMYCIN, TROUGH    Collection Time: 02/17/22  3:01 AM   Result Value Ref Range    Vancomycin-Trough 15.5 10.0 - 22.0 ug/mL      Imaging Results          MRI Spine Cervical-Thoracic-Lumbar W W/O Contrast (XPD) (Final result)  Result time 02/14/22 23:34:22    Final result by José  MD Dane (02/14/22 23:34:22)                 Impression:      Suspected discitis/osteomyelitis of the lumbar spine at L3-4 and L4-5 with postoperative changes of posterior fusion from L3 through L5 with probable hardware infection, better visualized on prior CT.  Neurosurgical follow-up recommended.      Electronically signed by: José Ward  Date:    02/14/2022  Time:    23:34             Narrative:    EXAMINATION:  MRI SPINE CERVICAL-THORACIC-LUMBAR W W/O CONTRAST (XPD)    CLINICAL HISTORY:  Numbness or tingling, paresthesia (Ped 0-18y);infection;    TECHNIQUE:  Multiplanar MRI of the cervical spine, thoracic spine and lumbar spine without contrast.  Dose given is 10 cc Gadavist intravenous contrast.    COMPARISON:  Correlation with CT lumbar spine 02/14/2022    FINDINGS:  Cervical spine:    Cervical spinal cord is normal in course and caliber.  No evidence of edema or myelomalacia.  Cervicomedullary junction appears adequately maintained.    Mild straightening of normal cervical lordosis.    No evidence of vertebral body acute fracture or marrow replacement.    Moderate disc space narrowing throughout the cervical spine.  Endplate degenerative changes at C5-6 and C6-7, most prominent at the inferior endplate of C6.    Minimal disc bulge at C2-3.  Mild diffuse posterior disc osteophyte complex at C3-4, C4-5, C5-6, C6-7 and C7-T1.    No significant central canal or foraminal narrowing.    No paraspinal mass or fluid collection.    No evidence of epidural hematoma or mass.    Thoracic spine:    Thoracic spinal cord is normal in course and caliber.  No evidence of edema or myelomalacia.    Thoracic vertebral bodies appear adequately maintained.  No evidence of fracture, edema or marrow replacement.    No significant disc bulge or protrusion in the thoracic spine.    Central canal appears adequately maintained.  Probable mild foraminal narrowing in the mid thoracic spine.    Slight motion artifact obscures  visualization.    Lumbar spine:    Distal spinal cord ends normally at approximately the L1 level.    Posterior surgical fusion hardware from L3 through L5.  Pedicle screws at L3, L4 and L5 on the right and L3 and L5 on the left.  Posterior rods bilaterally.    Interbody spacers at L3-4 and L4-5.    Significant metallic artifact.    Soft tissue edema noted posteriorly.  There is loss of height of the L4 and L3 vertebral bodies.  This is better visualized on prior CT.    Discitis/osteomyelitis of L3-4 and to lesser degree L4-5 better visualized on prior CT.  Ample metallic artifact obscures these levels.    Epidural component is not completely excluded at the surgical levels.                               MRI Brain W WO Contrast (Final result)  Result time 02/14/22 22:59:17    Final result by José Vela MD (02/14/22 22:59:17)                 Impression:      1. No acute intracranial process.  2. Patchy nonspecific increased T2 and FLAIR signal in the periventricular and subcortical white matter bilaterally could be associated with chronic microvascular ischemic changes, slightly greater than expected for the patient's chronologic age.  A demyelinating process such as multiple sclerosis could appear similar.  No evidence of active demyelination.  Recommend clinical correlation.      Electronically signed by: José Vela  Date:    02/14/2022  Time:    22:59             Narrative:    EXAMINATION:  MRI BRAIN W WO CONTRAST    CLINICAL HISTORY:  Numbness or tingling, paresthesia (Ped 0-18y);Craniotomy, post-op;.    TECHNIQUE:  Multiplanar multisequence MR imaging of the brain was performed before and after the administration of  mL Gadavist  intravenous contrast.    COMPARISON:  None.    FINDINGS:  Intracranial compartment:    Ventricles and sulci are normal in size for age without evidence of hydrocephalus. No extra-axial blood or fluid collections.    Patchy nonspecific increased T2 and FLAIR signal in the  periventricular and subcortical white matter bilaterally could be associated with chronic microvascular ischemic changes slightly greater than expected for the patient's chronologic age.  Demyelinating process such as multiple sclerosis could appear similar.  No evidence of active demyelination.  No diffusion positive areas and no pathologic enhancement on post-contrast imaging.    No mass lesion, acute hemorrhage, edema or acute infarct. No abnormal enhancement.    Normal vascular flow voids are preserved.    Skull/extracranial contents (limited evaluation): Bone marrow signal intensity is normal.

## 2022-02-17 NOTE — PLAN OF CARE
[facility] (Hospital)  Discharge Reassessment    Primary Care Provider: Dannielle Merino DO    Expected Discharge Date: 2/18/2022     Patient is not medically ready for discharge.  Patient has a planned surgery on Monday 2/21/2022.  PT/OT to eval after surgery for discharge plan.    Reassessment (most recent)     Discharge Reassessment - 02/17/22 1007        Discharge Reassessment    Assessment Type Discharge Planning Reassessment     Did the patient's condition or plan change since previous assessment? No     Discharge Plan discussed with: Patient     Communicated SHIRA with patient/caregiver Date not available/Unable to determine     Discharge Plan A Home with family     Discharge Plan B Rehab     DME Needed Upon Discharge  none     Discharge Barriers Identified None     Why the patient remains in the hospital Requires continued medical care        Post-Acute Status    Discharge Delays None known at this time

## 2022-02-17 NOTE — PROGRESS NOTES
[facility] - Neurosurgery ([facility])  [facility] Medicine  Progress Note    Patient Name: Rachel Zazueta  MRN: [id number]  Patient Class: IP- Inpatient   Admission Date: 2/14/2022  Length of Stay: 3 days  Attending Physician: Guille Templeton MD  Primary Care Provider: Dannielle Merino DO        Subjective:     Principal Problem:<principal problem not specified>        HPI:  Rachel Zazueta is a 40 y/o F with a PMH of cirrhosis, Hep C, anemia, polysubstance abuse, and epidural abscess s/p L3-L4 laminectomy and L3-L5 TLIF (4/2021; blood and surgical cultures positive for group B strep) who was admitted from NS clinic for workup of severe back pain and LE weakness. She reports that she has had worsening pain in her lower back and hips since October. She also reports shooting pain down her left leg. She ambulates with a walker at home due to the pain and difficulty with balance. She denies bowel or bladder incontinence. She denies CP, SOB, nausea, vomiting. Denies hx of HTN or DM. She was lost to follow up after her previous surgery, stating that she did not go to her appointments because she was feeling good. She states that her last IV meth use was 8/2020, and last meth use was after her previous surgery 4/2021 (however tox screen 7/2021 positive for amphetamines). She reports marijuana use approx 3x per week and smokes cigarettes 1/2 pack per day. She denies use of other substances including cocaine. MRI C/T/L spine showed suspected discitis/osteomyelitis at L3-4 and L4-5 with probable hardware infection. L3-4 disc biopsy obtained by IR. Hospital medicine was consulted for preop risk stratification and medical optimization.       Overview/Hospital Course:  No notes on file    Interval History: NAEO. Pt reports that RUQ pain improved after having a BM last night. Utox positive for amphetamines, pt agreeable to addiction psych consult.     Review of Systems   Constitutional: Negative for chills and fever.   HENT:  Negative for congestion.    Eyes: Negative for visual disturbance.   Respiratory: Negative for chest tightness, shortness of breath and wheezing.    Cardiovascular: Negative for chest pain and palpitations.   Gastrointestinal: Negative for abdominal pain, nausea and vomiting.   Genitourinary: Negative for dysuria.   Musculoskeletal: Positive for back pain and gait problem. Negative for neck pain.   Skin: Negative for color change.   Neurological: Positive for weakness.   Psychiatric/Behavioral: Negative for agitation and confusion.     Objective:     Vital Signs (Most Recent):  Temp: 99 °F (37.2 °C) (02/17/22 0732)  Pulse: 85 (02/17/22 0732)  Resp: 18 (02/17/22 0838)  BP: 133/60 (02/17/22 0732)  SpO2: 99 % (02/17/22 0732) Vital Signs (24h Range):  Temp:  [98.2 °F (36.8 °C)-99 °F (37.2 °C)] 99 °F (37.2 °C)  Pulse:  [79-92] 85  Resp:  [18-20] 18  SpO2:  [94 %-100 %] 99 %  BP: (112-133)/(57-65) 133/60     Weight: 131.5 kg (290 lb)  Body mass index is 45.42 kg/m².    Intake/Output Summary (Last 24 hours) at 2/17/2022 1059  Last data filed at 2/17/2022 0253  Gross per 24 hour   Intake 550 ml   Output 2400 ml   Net -1850 ml      Physical Exam  Constitutional:       General: She is not in acute distress.  HENT:      Head: Normocephalic and atraumatic.      Nose: Nose normal.   Eyes:      Extraocular Movements: Extraocular movements intact.      Conjunctiva/sclera: Conjunctivae normal.   Cardiovascular:      Rate and Rhythm: Normal rate and regular rhythm.      Heart sounds: Normal heart sounds. No murmur heard.      Pulmonary:      Effort: Pulmonary effort is normal. No respiratory distress.      Breath sounds: No wheezing or rhonchi.   Abdominal:      General: Bowel sounds are normal. There is no distension.      Palpations: Abdomen is soft.      Tenderness: There is no abdominal tenderness (resolved).   Musculoskeletal:         General: Tenderness (midline and paravertebral tenderness over lumbar back) present. No  swelling.      Cervical back: Normal range of motion.   Skin:     General: Skin is warm and dry.      Comments: Well healed scar over lumbar back   Neurological:      General: No focal deficit present.      Mental Status: She is alert and oriented to person, place, and time.   Psychiatric:         Mood and Affect: Mood normal.         Behavior: Behavior normal.         Significant Labs: All pertinent labs within the past 24 hours have been reviewed.    Significant Imaging: I have reviewed all pertinent imaging results/findings within the past 24 hours.      Assessment/Plan:      Hyperbilirubinemia  Tbili 2.1 on admission.     Recommendations:  - daily CMP   - Tbili trending down     Coagulopathy        Tobacco use disorder  Reports currently smoking 1/2 ppd.      Pre-op evaluation  42 y/o F with hx of epidural abscess s/p L3 and L4 laminectomy and L3-L5 TLIF (4/2021) admitted from NS clinic with worsening lumbar back pain and LE weakness. MRI C/T/L spine showed suspected discitis/osteomyelitis at L3-4 and L4-5 with probable hardware infection. IR consulted for biopsy. TTE 2/15 with EF 60%. EKG with NS.    Surgical Risk Assessment    Active cardiac issues:  Active decompensated heart failure? No   Unstable angina?  No   Significant uncontrolled arrhythmias? No   Severe valvular heart disease-Aortic or Mitral Stenosis? No   Recent MI or coronary revascularization < 30 days? No     Cardiac Risk Factors  History of CAD/ischemic heart disease? No   History of cerebrovascular disease? No   History of compensated heart failure? No   Type 2 diabetes requiring insulin? No   Serum Creatinine > 2? No   Total cardiac risk factors 0     Functional mets limited by pain, able to walk with walker.    < 4* METs -unable to walk > 2 blocks on level ground without stopping due to symptoms  - eating, dressing, toileting, walking indoors, light housework. POOR   > 4* METs -climbing > 1 flight of stairs without stopping  -walking up  hill > 1-2 blocks  -scrubbing floors  -moving furniture  - golf, bowling, dancing or tennis  -running short distance MODERATE to EXCELLENT   * performance of any one of the activities       Cardiovascular Risk Assessment:  Non-emergent surgery.  No active cardiac problems (such as unstable angina, decompensated heart failure, significant uncontrolled arrhythmias or severe valvular disease).  Intermediate risk surgery.  Functional Status: limited by pain  Her revised cardiac risk index is 0 (3.9% 30-day risk of death, MI, or cardiac arrest).       Recommendation:  - Proceed to Surgery  - Will continue to follow        Thrombocytopenia  Chronic, likely due to cirrhosis.     Recommendations:  - daily CBC      Spondylodiscitis  Hx of epidural abscess 4/2021 with blood and surgical cultures positive for group B strep.  Echo 2/15 negative for endocarditis. ID following for antibiotic recs.     Recommendations:  - blood cultures NGTD  - biopsy gram stain negative, cultures and pathology pending   - urine tox screen positive for amphetamines, methadone; received opiates and benzos while admitted  - addiction psych consulted   - continue antibiotics per ID recs      Chronic hepatitis C with cirrhosis  PMH cirrhosis 2/2 HCV. Pt denies ever receiving treatment for HCV. Pt reports previous liver biopsy, but no records of this available. She has been referred to hepatology previously but did not follow up.   MELD-Na score: 9 at 2/17/2022  3:01 AM  MELD score: 9 at 2/17/2022  3:01 AM  Calculated from:  Serum Creatinine: 0.6 mg/dL (Using min of 1 mg/dL) at 2/17/2022  3:01 AM  Serum Sodium: 140 mmol/L (Using max of 137 mmol/L) at 2/17/2022  3:01 AM  Total Bilirubin: 1.3 mg/dL at 2/17/2022  3:01 AM  INR(ratio): 1.2 at 2/17/2022  3:01 AM  Age: 41 years    Recommendations:  - U/S abdomen with doppler showed cirrhosis and portal hypertension satisfactory Doppler evaluation   - daily CMP and INR  - will need hepatology referral on  "discharge   - lipase WNL, RUQ/epigastric pain resolved after BM     Cirrhosis of liver without ascites        Pancytopenia  Hx of bone marrow biopsy 2017 that was nondiagnostic.     Recommendations:  - daily labs  - transfuse for Hgb <7  - transfuse for plt <10 or <50 with bleeding        VTE Risk Mitigation (From admission, onward)         Ordered     IP VTE HIGH RISK PATIENT  Once         02/14/22 1936     Place sequential compression device  Until discontinued         02/14/22 1936                Discharge Planning   SHIRA: 2/18/2022     Code Status: Full Code   Is the patient medically ready for discharge?:     Reason for patient still in hospital (select all that apply): Treatment  Discharge Plan A: Home with family   Discharge Delays: None known at this time      Please secure chat Hospital Medicine M ("Medicine Consult Only") or contact me directly for any additional questions or concerns.      Cecille Rhodes MD  Department of Hospital Medicine   [facility] - Neurosurgery ([facility])    "

## 2022-02-17 NOTE — SUBJECTIVE & OBJECTIVE
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  Meth- Injection, 1/w, injection in the right hand and wrist. Denies shared needles with other people, but occasional reuse at times. In addition endorses using a "fresh point" for sites of injection. Last use 3 days prior that was inhal    Sexual activity: Yes     Partners: Male     Birth control/protection: None     Review of patient's allergies indicates:   Allergen Reactions    Bee venom protein (honey bee) Anaphylaxis     Patient reports she is allergic to bee stings.    Wasp sting [allergen ext-venom-honey bee] Anaphylaxis    Adhesive Blisters    Strawberry Hives     Patient reports itching and hives    Iodine and iodide containing products Hives    Naproxen Other (See Comments)     Throat closing    Shellfish containing products     Nuts [tree nut] Rash       No current facility-administered medications on file prior to encounter.     Current Outpatient Medications on File Prior to Encounter   Medication Sig    diclofenac sodium (VOLTAREN) 1 % Gel Apply 2 g topically 4 (four) times daily.    polyethylene glycol (GLYCOLAX) 17 gram/dose powder Take 17 g by mouth once daily.    pregabalin (LYRICA) 50 MG capsule Take 1 capsule (50 mg total) by mouth 3 (three) " "times daily.    sertraline (ZOLOFT) 25 MG tablet Take 1 tablet (25 mg total) by mouth once daily.     Psychotherapeutics (From admission, onward)            Start     Stop Route Frequency Ordered    02/15/22 0900  sertraline tablet 25 mg         -- Oral Daily 02/14/22 1937        Review of Systems   Review of Systems  Complete medical review of systems performed covering Constitutional, Eyes, ENT/Mouth, Cardiovascular, Respiratory, Gastrointestinal, Genitourinary, Musculoskeletal, Skin, Neurologic, Endocrine, Heme/Lymph, and Allergy/Immune.   Complete review of systems was negative with the exception of the following positive symptoms: back pain  Complete psychiatric review of systems performed with the following positive symptoms: decreased sleep, depressed mood    Strengths and Liabilities: Strength: Patient is stable., Liability: Patient is defensive., Liability: Patient is impulsive., Liability: Patient lacks coping skills.    Objective:     Vital Signs (Most Recent):  Temp: 98.2 °F (36.8 °C) (02/17/22 1116)  Pulse: 90 (02/17/22 1116)  Resp: 18 (02/17/22 1116)  BP: 126/64 (02/17/22 1116)  SpO2: 96 % (02/17/22 1116) Vital Signs (24h Range):  Temp:  [98.2 °F (36.8 °C)-99 °F (37.2 °C)] 98.2 °F (36.8 °C)  Pulse:  [79-92] 90  Resp:  [18-20] 18  SpO2:  [94 %-100 %] 96 %  BP: (112-133)/(57-65) 126/64     Height: 5' 7" (170.2 cm)  Weight: 131.5 kg (290 lb)  Body mass index is 45.42 kg/m².      Intake/Output Summary (Last 24 hours) at 2/17/2022 1210  Last data filed at 2/17/2022 0253  Gross per 24 hour   Intake 550 ml   Output 2400 ml   Net -1850 ml       PAIN   Unable to assess    CONSTITUTIONAL  General Appearance and Manner: Middle aged female, lying in bed, in no acute distress    MUSCULOSKELETAL  Abnormal Involuntary Movements: None observed  Muscle Strength and Tone:  Unable to assess  Gait and Station: Unable to assess    PSYCHIATRIC   Orientation: Self, location, and year   Speech: Normal in tone, volume, and " rate but eventually tearfyk  Language: English, fluent, no aphasia, dysarthria  Mood: Depressed, neutral  Affect: Anxious, intermittently defensive  Thought Process: Linear  Associations: Intact  Thought Content: No SI, HI, AVH, or delusional thoughts noted  Memory: Recent in tact  Attention and Concentration: Unable to assess  Fund of Knowledge: Unable to assess  Insight: Limited, fair  Judgment: Good

## 2022-02-17 NOTE — ASSESSMENT & PLAN NOTE
ASSESSMENT:     42 y/o female with hx of liver cirrohosis, hep C, and polysubstance use. UDS positive for methadone, opiates, amphetamines, and benzos. On interview, patient reports weekly cannabis use; endorses period of sobriety from methamphetamine for many months until recent relapse last week. Also reports some illicit opioid prescription use she attributes to back pain. Otherwise, no current withdrawal symptoms. Possibly minimizing substance use. Not interested in rehab or detox at this time. Counseled patient on rehab (inpatient and outpatient) options.  Does report depressed mood for which her PCP started her on antidepressants.     DIAGNOSES & PROBLEMS    Amphetamine Use Disorder, current, moderate to severe  Cannabis Use Disorder, current, severe  Opiate Use Disorder (Illicit Prescription Use), mild to moderate   MDD      STRENGTHS AND LIABILITIES   Strength: Patient is stable., Liability: Patient is defensive., Liability: Patient is impulsive., Liability: Patient lacks coping skills.      MOTIVATION TO PURSUE RECOVERY: virtually non-existent    ACCEPTANCE OF ADDICTION: fair, limited    ABILITY TO ADHERE TO TREATMENT PLAN: N/A      PLAN:       MANAGEMENT PLAN, TREATMENT GOALS, THERAPEUTIC TECHNIQUES/APPROACHES & CLINICAL REASONING    · Patient not interested in MAT at this time; no withdrawal symptoms noted. Would defer pain management to primary team at this time.   · Would increase patient's current Zoloft dose from 25mg to 50mg to better address mood symptoms.   · Patient counseled on abstinence from alcohol and substances of abuse (illicit and prescription).  · Relapse prevention and motivational interviewing provided.  · Education provided on 12 step recovery programs--will also provide substance use resources in discharge paper/AVS.      PRESCRIPTION DRUG MANAGEMENT  - The risks and benefits of medication were discussed with this patient.  - Possible expectable adverse effects of any current or  proposed individual psychotropic agents were discussed with this patient.  - Counseling was provided on the importance of full compliance with medication regimens.      In cases of emergency, daily coverage provided by Acute/ER Psych MD, NP, or SW, with contact numbers located in [facility] On Call Schedule    Case discussed with staff addiction psychiatrist: GLADIS JIMENEZ MD      LABS/IMAGING/STUDIES   Recent Results (from the past 24 hour(s))   Lipase    Collection Time: 02/16/22  1:23 PM   Result Value Ref Range    Lipase 37 4 - 60 U/L   CBC auto differential    Collection Time: 02/17/22  3:01 AM   Result Value Ref Range    WBC 2.43 (L) 3.90 - 12.70 K/uL    RBC 3.62 (L) 4.00 - 5.40 M/uL    Hemoglobin 9.8 (L) 12.0 - 16.0 g/dL    Hematocrit 32.1 (L) 37.0 - 48.5 %    MCV 89 82 - 98 fL    MCH 27.1 27.0 - 31.0 pg    MCHC 30.5 (L) 32.0 - 36.0 g/dL    RDW 18.1 (H) 11.5 - 14.5 %    Platelets 67 (L) 150 - 450 K/uL    MPV 9.3 9.2 - 12.9 fL    Immature Granulocytes 0.4 0.0 - 0.5 %    Gran # (ANC) 1.6 (L) 1.8 - 7.7 K/uL    Immature Grans (Abs) 0.01 0.00 - 0.04 K/uL    Lymph # 0.6 (L) 1.0 - 4.8 K/uL    Mono # 0.2 (L) 0.3 - 1.0 K/uL    Eos # 0.1 0.0 - 0.5 K/uL    Baso # 0.02 0.00 - 0.20 K/uL    nRBC 0 0 /100 WBC    Gran % 63.7 38.0 - 73.0 %    Lymph % 23.5 18.0 - 48.0 %    Mono % 9.1 4.0 - 15.0 %    Eosinophil % 2.5 0.0 - 8.0 %    Basophil % 0.8 0.0 - 1.9 %    Differential Method Automated    Comprehensive metabolic panel    Collection Time: 02/17/22  3:01 AM   Result Value Ref Range    Sodium 140 136 - 145 mmol/L    Potassium 3.9 3.5 - 5.1 mmol/L    Chloride 108 95 - 110 mmol/L    CO2 24 23 - 29 mmol/L    Glucose 93 70 - 110 mg/dL    BUN 8 6 - 20 mg/dL    Creatinine 0.6 0.5 - 1.4 mg/dL    Calcium 8.6 (L) 8.7 - 10.5 mg/dL    Total Protein 8.0 6.0 - 8.4 g/dL    Albumin 2.2 (L) 3.5 - 5.2 g/dL    Total Bilirubin 1.3 (H) 0.1 - 1.0 mg/dL    Alkaline Phosphatase 112 55 - 135 U/L    AST 44 (H) 10 - 40 U/L    ALT 16 10 -  44 U/L    Anion Gap 8 8 - 16 mmol/L    eGFR if African American >60.0 >60 mL/min/1.73 m^2    eGFR if non African American >60.0 >60 mL/min/1.73 m^2   Protime-INR    Collection Time: 02/17/22  3:01 AM   Result Value Ref Range    Prothrombin Time 12.6 (H) 9.0 - 12.5 sec    INR 1.2 0.8 - 1.2   VANCOMYCIN, TROUGH    Collection Time: 02/17/22  3:01 AM   Result Value Ref Range    Vancomycin-Trough 15.5 10.0 - 22.0 ug/mL      Imaging Results          MRI Spine Cervical-Thoracic-Lumbar W W/O Contrast (XPD) (Final result)  Result time 02/14/22 23:34:22    Final result by José Vela MD (02/14/22 23:34:22)                 Impression:      Suspected discitis/osteomyelitis of the lumbar spine at L3-4 and L4-5 with postoperative changes of posterior fusion from L3 through L5 with probable hardware infection, better visualized on prior CT.  Neurosurgical follow-up recommended.      Electronically signed by: José Vela  Date:    02/14/2022  Time:    23:34             Narrative:    EXAMINATION:  MRI SPINE CERVICAL-THORACIC-LUMBAR W W/O CONTRAST (XPD)    CLINICAL HISTORY:  Numbness or tingling, paresthesia (Ped 0-18y);infection;    TECHNIQUE:  Multiplanar MRI of the cervical spine, thoracic spine and lumbar spine without contrast.  Dose given is 10 cc Gadavist intravenous contrast.    COMPARISON:  Correlation with CT lumbar spine 02/14/2022    FINDINGS:  Cervical spine:    Cervical spinal cord is normal in course and caliber.  No evidence of edema or myelomalacia.  Cervicomedullary junction appears adequately maintained.    Mild straightening of normal cervical lordosis.    No evidence of vertebral body acute fracture or marrow replacement.    Moderate disc space narrowing throughout the cervical spine.  Endplate degenerative changes at C5-6 and C6-7, most prominent at the inferior endplate of C6.    Minimal disc bulge at C2-3.  Mild diffuse posterior disc osteophyte complex at C3-4, C4-5, C5-6, C6-7 and C7-T1.    No  significant central canal or foraminal narrowing.    No paraspinal mass or fluid collection.    No evidence of epidural hematoma or mass.    Thoracic spine:    Thoracic spinal cord is normal in course and caliber.  No evidence of edema or myelomalacia.    Thoracic vertebral bodies appear adequately maintained.  No evidence of fracture, edema or marrow replacement.    No significant disc bulge or protrusion in the thoracic spine.    Central canal appears adequately maintained.  Probable mild foraminal narrowing in the mid thoracic spine.    Slight motion artifact obscures visualization.    Lumbar spine:    Distal spinal cord ends normally at approximately the L1 level.    Posterior surgical fusion hardware from L3 through L5.  Pedicle screws at L3, L4 and L5 on the right and L3 and L5 on the left.  Posterior rods bilaterally.    Interbody spacers at L3-4 and L4-5.    Significant metallic artifact.    Soft tissue edema noted posteriorly.  There is loss of height of the L4 and L3 vertebral bodies.  This is better visualized on prior CT.    Discitis/osteomyelitis of L3-4 and to lesser degree L4-5 better visualized on prior CT.  Ample metallic artifact obscures these levels.    Epidural component is not completely excluded at the surgical levels.                               MRI Brain W WO Contrast (Final result)  Result time 02/14/22 22:59:17    Final result by José Vela MD (02/14/22 22:59:17)                 Impression:      1. No acute intracranial process.  2. Patchy nonspecific increased T2 and FLAIR signal in the periventricular and subcortical white matter bilaterally could be associated with chronic microvascular ischemic changes, slightly greater than expected for the patient's chronologic age.  A demyelinating process such as multiple sclerosis could appear similar.  No evidence of active demyelination.  Recommend clinical correlation.      Electronically signed by: José  Ed  Date:    02/14/2022  Time:    22:59             Narrative:    EXAMINATION:  MRI BRAIN W WO CONTRAST    CLINICAL HISTORY:  Numbness or tingling, paresthesia (Ped 0-18y);Craniotomy, post-op;.    TECHNIQUE:  Multiplanar multisequence MR imaging of the brain was performed before and after the administration of  mL Gadavist  intravenous contrast.    COMPARISON:  None.    FINDINGS:  Intracranial compartment:    Ventricles and sulci are normal in size for age without evidence of hydrocephalus. No extra-axial blood or fluid collections.    Patchy nonspecific increased T2 and FLAIR signal in the periventricular and subcortical white matter bilaterally could be associated with chronic microvascular ischemic changes slightly greater than expected for the patient's chronologic age.  Demyelinating process such as multiple sclerosis could appear similar.  No evidence of active demyelination.  No diffusion positive areas and no pathologic enhancement on post-contrast imaging.    No mass lesion, acute hemorrhage, edema or acute infarct. No abnormal enhancement.    Normal vascular flow voids are preserved.    Skull/extracranial contents (limited evaluation): Bone marrow signal intensity is normal.

## 2022-02-17 NOTE — DISCHARGE INSTRUCTIONS
MENTAL HEALTH/ADDICTIVE DISORDERS  REFERRAL RECOMMENDATIONS    Suboxone    [facility] - [phone]    [facility]  [address], [address], [place] [address]  [phone]; [phone]    [facility]  [phone]    Dr. Stephen Mejia - can do monthly sublocade injection  [phone]  [address], [place]    Methadone Maintenance    [facility]   [address] [place] [address], [phone]  [address], [place], [place] [address] [phone]      I. AA ([phone]) [email or web address] or NA ([phone])    II. [facility] Add[facility]ive [facility]al [facility] (ABU) Intensive Outpatient Program [phone], option 2    III. Other Places for Outpatient Addictive Disorders and Mental Health Treatment in [facility]:    [facility] (Steph Vasquez, Suzy Vences; accepts Medicaid, commercial insurance) [phone]    [facility] ([place]) [phone], [address] [facility] Mental Health [phone]; Crisis [phone] - Call for options A-E:    [facility] [place], [address] [place] [address]    [facility] [place], [address] [place] [address]    [facility], [address] [place] [address],    [facility], 2nd floor [address] [place] [address]    [facility] [place], [address], [place] [address]    [facility] [place], [facility] [place] [address]    [facility], [address] [place] [address], [phone]    [facility], Terry/[place]. [place]/Erika/Christina/[place] [phone]    [facility] (Mental & General), [facility], [phone]    [facility] (Mental & General Health, not only HIV+, 3 [place] locations) [phone]    [facility], [address] [facility], [address], [phone]     [facility], [address] [facility], [phone], [phone]      IV. Addiction and Mental Health Treatment in Other [facility]:    [facility], [address] [facility] [place], [phone]    [facility], [phone], [address] [facility], [address], [phone]    [facility]. [address] [facility], [phone]    [facility], [address] [facility], [phone]    [facility], [address] [facility], [phone] ([facility])    [facility], [address] [facility], [phone] ([facility])    [facility], [address] [facility], [place], [phone] ([place], [place], and [facility])    [facility], [address] [facility], [phone] ([facility])    [facility] Hotline [phone], [phone]    [facility], [address] [facility], [facility], [address] [facility], [address], [facility] [place], [address] [facility], [facility]al [facility], [address] [facility] [address] [facility], [address], [place]    [facility], [address], [place] [phone]    [facility], [address] [facility] [phone]    [facility], [address] [facility], [phone]    [facility], [address] [facility], [phone]      V. Residential Addictive Disorders Treatment (call every day until you get in):    [facility] [address] [facility], [phone]    [facility] (men only) [address] [facility], [phone]    [facility], [facility],  "men's program [phone], women's program, [phone]    [facility], [address] [facility], [phone]    [facility] (Female only) [address] [facility], [phone]    [facility] (IOP, residential, low cost, MCaid) Smaeer Stewart, [phone]    [facility] (Men only, [phone]), [address] [facility], [place]    [facility] (Pregnant/women with or without children, [phone])    [facility] (Women only), [address] [facility], [phone]    [facility] (men only)[facility] [phone]    VI. Inpatient Rehabs (out of area)    [facility] [place], [phone]     [facility], [phone]    [facility], [phone]    [facility], [phone]    [place], [place] ([phone])    [facility]) [phone]    VII. [place] Speaking:  Información de la reunión de Alcohólicos Anónimos  [facility]rit [facility], 10:00 am  Habla español  Esta reunión está abierta y cualquiera puede asistir.    [place]an speaking Alcoholics anonymous meetings:  El "Amrit Forbes AA Skype" es un amrit on line de Alcohólicos Anónimos en [facility]. El amrit es hao, gratuito y virtual a través de Skype Audio. El amrit funciona mediante carol llamada grupal de voz, por lo que no se utiliza la videollamada, ni se pueden zelda las imágenes o rostros de los participantes. Hace ramon años y medio abrimos el primer Amrit de AA por Skype en [place], octaviano actualmente asisten personas desde [place], [place], [place], [place], [place], [place], [place], [place], [place], [place], [place] y [place], entre otros.    El amrit es muy útil para los alcohólicos que residen en lugares donde no se celebran reuniones de AA, o residen en lugares donde las reuniones de AA son un número limitado de días a la semana, o para aquellos compañeros que se hayan de viaje o que, por cualquier motivo, se hayan convalecientes y no pueden desplazarse. Todos los días nos " reuniones a las 21:00 (hora española)    Podéis obtener más información sobre el sebas y pamela sesiones en la página web [email or web address]    VIII. Suboxone Doctors in Other regions    [place], [place]:    [facility] - [address] [place] [address] - Tel: [phone]    [facility]im[facility] Diamond - [address] [place] [address] - Tel: [phone]    Ad Beltran - [address] [place] [address] - Tel: [phone]    Gamaliel Vasquez - [address] [place] [address] - Tel: [phone]    Tim Corral - [address] [place] [address] Tel:[phone]    [place], [place]:     Dr. Kyler Richard and Dr. Oli Daniels - [address] [place], [place] - Tel: [phone]    Dr. Yahaira Henderson - [address]  [place], [place] - Tel: [phone]    Dr. Javon Parra - Tel: [phone]    Dr. Tray [place] [facility] - [phone]        VIII. In case of suicidal thinking, call the COPE LINE [phone] / [phone]

## 2022-02-17 NOTE — SUBJECTIVE & OBJECTIVE
Interval History: NAEO. Pt reports that RUQ pain improved after having a BM last night. Utox positive for amphetamines, pt agreeable to addiction psych consult.     Review of Systems   Constitutional: Negative for chills and fever.   HENT: Negative for congestion.    Eyes: Negative for visual disturbance.   Respiratory: Negative for chest tightness, shortness of breath and wheezing.    Cardiovascular: Negative for chest pain and palpitations.   Gastrointestinal: Negative for abdominal pain, nausea and vomiting.   Genitourinary: Negative for dysuria.   Musculoskeletal: Positive for back pain and gait problem. Negative for neck pain.   Skin: Negative for color change.   Neurological: Positive for weakness.   Psychiatric/Behavioral: Negative for agitation and confusion.     Objective:     Vital Signs (Most Recent):  Temp: 99 °F (37.2 °C) (02/17/22 0732)  Pulse: 85 (02/17/22 0732)  Resp: 18 (02/17/22 0838)  BP: 133/60 (02/17/22 0732)  SpO2: 99 % (02/17/22 0732) Vital Signs (24h Range):  Temp:  [98.2 °F (36.8 °C)-99 °F (37.2 °C)] 99 °F (37.2 °C)  Pulse:  [79-92] 85  Resp:  [18-20] 18  SpO2:  [94 %-100 %] 99 %  BP: (112-133)/(57-65) 133/60     Weight: 131.5 kg (290 lb)  Body mass index is 45.42 kg/m².    Intake/Output Summary (Last 24 hours) at 2/17/2022 1059  Last data filed at 2/17/2022 0253  Gross per 24 hour   Intake 550 ml   Output 2400 ml   Net -1850 ml      Physical Exam  Constitutional:       General: She is not in acute distress.  HENT:      Head: Normocephalic and atraumatic.      Nose: Nose normal.   Eyes:      Extraocular Movements: Extraocular movements intact.      Conjunctiva/sclera: Conjunctivae normal.   Cardiovascular:      Rate and Rhythm: Normal rate and regular rhythm.      Heart sounds: Normal heart sounds. No murmur heard.      Pulmonary:      Effort: Pulmonary effort is normal. No respiratory distress.      Breath sounds: No wheezing or rhonchi.   Abdominal:      General: Bowel sounds are normal.  There is no distension.      Palpations: Abdomen is soft.      Tenderness: There is no abdominal tenderness (resolved).   Musculoskeletal:         General: Tenderness (midline and paravertebral tenderness over lumbar back) present. No swelling.      Cervical back: Normal range of motion.   Skin:     General: Skin is warm and dry.      Comments: Well healed scar over lumbar back   Neurological:      General: No focal deficit present.      Mental Status: She is alert and oriented to person, place, and time.   Psychiatric:         Mood and Affect: Mood normal.         Behavior: Behavior normal.         Significant Labs: All pertinent labs within the past 24 hours have been reviewed.    Significant Imaging: I have reviewed all pertinent imaging results/findings within the past 24 hours.
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
## 2022-02-18 ENCOUNTER — ANESTHESIA EVENT (OUTPATIENT)
Dept: SURGERY | Facility: HOSPITAL | Age: 42
DRG: 460 | End: 2022-02-18
Payer: MEDICAID

## 2022-02-18 LAB
ALBUMIN SERPL BCP-MCNC: 2.1 G/DL (ref 3.5–5.2)
ALP SERPL-CCNC: 109 U/L (ref 55–135)
ALT SERPL W/O P-5'-P-CCNC: 15 U/L (ref 10–44)
ANION GAP SERPL CALC-SCNC: 6 MMOL/L (ref 8–16)
AST SERPL-CCNC: 51 U/L (ref 10–40)
BASOPHILS # BLD AUTO: 0.02 K/UL (ref 0–0.2)
BASOPHILS NFR BLD: 0.9 % (ref 0–1.9)
BILIRUB SERPL-MCNC: 1.2 MG/DL (ref 0.1–1)
BUN SERPL-MCNC: 9 MG/DL (ref 6–20)
CALCIUM SERPL-MCNC: 7.9 MG/DL (ref 8.7–10.5)
CHLORIDE SERPL-SCNC: 107 MMOL/L (ref 95–110)
CO2 SERPL-SCNC: 24 MMOL/L (ref 23–29)
CREAT SERPL-MCNC: 0.5 MG/DL (ref 0.5–1.4)
DIFFERENTIAL METHOD: ABNORMAL
EOSINOPHIL # BLD AUTO: 0.1 K/UL (ref 0–0.5)
EOSINOPHIL NFR BLD: 3.6 % (ref 0–8)
ERYTHROCYTE [DISTWIDTH] IN BLOOD BY AUTOMATED COUNT: 18 % (ref 11.5–14.5)
EST. GFR  (AFRICAN AMERICAN): >60 ML/MIN/1.73 M^2
EST. GFR  (NON AFRICAN AMERICAN): >60 ML/MIN/1.73 M^2
FINAL PATHOLOGIC DIAGNOSIS: NORMAL
GLUCOSE SERPL-MCNC: 82 MG/DL (ref 70–110)
GROSS: NORMAL
HCT VFR BLD AUTO: 29.9 % (ref 37–48.5)
HGB BLD-MCNC: 9.4 G/DL (ref 12–16)
IMM GRANULOCYTES # BLD AUTO: 0.01 K/UL (ref 0–0.04)
IMM GRANULOCYTES NFR BLD AUTO: 0.4 % (ref 0–0.5)
INR PPP: 1.3 (ref 0.8–1.2)
LYMPHOCYTES # BLD AUTO: 0.5 K/UL (ref 1–4.8)
LYMPHOCYTES NFR BLD: 24 % (ref 18–48)
Lab: NORMAL
MCH RBC QN AUTO: 27.3 PG (ref 27–31)
MCHC RBC AUTO-ENTMCNC: 31.4 G/DL (ref 32–36)
MCV RBC AUTO: 87 FL (ref 82–98)
MONOCYTES # BLD AUTO: 0.2 K/UL (ref 0.3–1)
MONOCYTES NFR BLD: 8.4 % (ref 4–15)
NEUTROPHILS # BLD AUTO: 1.4 K/UL (ref 1.8–7.7)
NEUTROPHILS NFR BLD: 62.7 % (ref 38–73)
NRBC BLD-RTO: 0 /100 WBC
PLATELET # BLD AUTO: 74 K/UL (ref 150–450)
PMV BLD AUTO: 8.7 FL (ref 9.2–12.9)
POTASSIUM SERPL-SCNC: 4.3 MMOL/L (ref 3.5–5.1)
PROT SERPL-MCNC: 7.6 G/DL (ref 6–8.4)
PROTHROMBIN TIME: 12.8 SEC (ref 9–12.5)
RBC # BLD AUTO: 3.44 M/UL (ref 4–5.4)
SODIUM SERPL-SCNC: 137 MMOL/L (ref 136–145)
VANCOMYCIN TROUGH SERPL-MCNC: 11.3 UG/ML (ref 10–22)
WBC # BLD AUTO: 2.25 K/UL (ref 3.9–12.7)

## 2022-02-18 PROCEDURE — 25000003 PHARM REV CODE 250

## 2022-02-18 PROCEDURE — 36415 COLL VENOUS BLD VENIPUNCTURE: CPT | Performed by: NEUROLOGICAL SURGERY

## 2022-02-18 PROCEDURE — 99233 PR SUBSEQUENT HOSPITAL CARE,LEVL III: ICD-10-PCS | Mod: ,,, | Performed by: PHYSICIAN ASSISTANT

## 2022-02-18 PROCEDURE — 80053 COMPREHEN METABOLIC PANEL: CPT

## 2022-02-18 PROCEDURE — 36415 COLL VENOUS BLD VENIPUNCTURE: CPT

## 2022-02-18 PROCEDURE — 80202 ASSAY OF VANCOMYCIN: CPT | Performed by: NEUROLOGICAL SURGERY

## 2022-02-18 PROCEDURE — 11000001 HC ACUTE MED/SURG PRIVATE ROOM

## 2022-02-18 PROCEDURE — 25000003 PHARM REV CODE 250: Performed by: NEUROLOGICAL SURGERY

## 2022-02-18 PROCEDURE — 25000003 PHARM REV CODE 250: Performed by: STUDENT IN AN ORGANIZED HEALTH CARE EDUCATION/TRAINING PROGRAM

## 2022-02-18 PROCEDURE — 85025 COMPLETE CBC W/AUTO DIFF WBC: CPT | Performed by: STUDENT IN AN ORGANIZED HEALTH CARE EDUCATION/TRAINING PROGRAM

## 2022-02-18 PROCEDURE — 99233 SBSQ HOSP IP/OBS HIGH 50: CPT | Mod: ,,, | Performed by: PHYSICIAN ASSISTANT

## 2022-02-18 PROCEDURE — 63600175 PHARM REV CODE 636 W HCPCS: Performed by: PHYSICIAN ASSISTANT

## 2022-02-18 PROCEDURE — 85610 PROTHROMBIN TIME: CPT

## 2022-02-18 PROCEDURE — 63600175 PHARM REV CODE 636 W HCPCS: Performed by: NEUROLOGICAL SURGERY

## 2022-02-18 RX ORDER — HEPARIN SODIUM 5000 [USP'U]/ML
7500 INJECTION, SOLUTION INTRAVENOUS; SUBCUTANEOUS EVERY 8 HOURS
Status: DISPENSED | OUTPATIENT
Start: 2022-02-18 | End: 2022-02-21

## 2022-02-18 RX ORDER — AMOXICILLIN 250 MG
1 CAPSULE ORAL DAILY
Status: DISCONTINUED | OUTPATIENT
Start: 2022-02-18 | End: 2022-03-15 | Stop reason: HOSPADM

## 2022-02-18 RX ORDER — HEPARIN SODIUM 5000 [USP'U]/ML
5000 INJECTION, SOLUTION INTRAVENOUS; SUBCUTANEOUS EVERY 8 HOURS
Status: DISCONTINUED | OUTPATIENT
Start: 2022-02-18 | End: 2022-02-18

## 2022-02-18 RX ADMIN — CEFTRIAXONE SODIUM 2 G: 2 INJECTION, SOLUTION INTRAVENOUS at 01:02

## 2022-02-18 RX ADMIN — VANCOMYCIN HYDROCHLORIDE 2000 MG: 10 INJECTION, POWDER, LYOPHILIZED, FOR SOLUTION INTRAVENOUS at 04:02

## 2022-02-18 RX ADMIN — HEPARIN SODIUM 7500 UNITS: 5000 INJECTION INTRAVENOUS; SUBCUTANEOUS at 04:02

## 2022-02-18 RX ADMIN — PANTOPRAZOLE SODIUM 40 MG: 20 TABLET, DELAYED RELEASE ORAL at 09:02

## 2022-02-18 RX ADMIN — OXYCODONE 5 MG: 5 TABLET ORAL at 04:02

## 2022-02-18 RX ADMIN — PREGABALIN 50 MG: 50 CAPSULE ORAL at 09:02

## 2022-02-18 RX ADMIN — OXYCODONE 5 MG: 5 TABLET ORAL at 10:02

## 2022-02-18 RX ADMIN — POLYETHYLENE GLYCOL 3350 17 G: 17 POWDER, FOR SOLUTION ORAL at 09:02

## 2022-02-18 RX ADMIN — VANCOMYCIN HYDROCHLORIDE 2250 MG: 1.25 INJECTION, POWDER, LYOPHILIZED, FOR SOLUTION INTRAVENOUS at 05:02

## 2022-02-18 RX ADMIN — PREGABALIN 50 MG: 50 CAPSULE ORAL at 10:02

## 2022-02-18 RX ADMIN — OXYCODONE 5 MG: 5 TABLET ORAL at 09:02

## 2022-02-18 RX ADMIN — SENNOSIDES AND DOCUSATE SODIUM 1 TABLET: 50; 8.6 TABLET ORAL at 10:02

## 2022-02-18 RX ADMIN — SERTRALINE HYDROCHLORIDE 50 MG: 50 TABLET ORAL at 09:02

## 2022-02-18 RX ADMIN — PREGABALIN 50 MG: 50 CAPSULE ORAL at 04:02

## 2022-02-18 NOTE — ANESTHESIA PREPROCEDURE EVALUATION
[facility]  Anesthesia Pre-Operative Evaluation         Patient Name: Rachel Zazueta  YOB: 1980  MRN: [id number]    SUBJECTIVE:     Pre-operative evaluation for Procedure(s) (LRB):  AIRO FUSION, SPINE T10-PELVIS (N/A)     02/18/2022    Rachel Zazueta is a 41 y.o. female w/ a significant morbid obesity (BMI 45), PMHx of Hep C, liver cirrhosis, polysubstance abuse, tobacco abuse, epidural abscess s/p L3-L4 laminectomy and L3-L5 TLIF (4/2021; blood and surgical cultures positive for group B strep) who was admitted from NSGY clinic for workup of severe back pain and LE weakness.    Prev airway:   4/16/21  Mask Ventilation:  Easy mask    Attempts:  1    Attempted By:  CRNA    Method of Intubation:  Direct    Blade:  Moore 2    Laryngeal View Grade: Grade I - full view of chords      LDA:        Peripheral IV - Single Lumen 02/16/22 1541 20 G;1 3/4 in Left Forearm (Active)     Current Inpatient Medications:    cefTRIAXone (ROCEPHIN) IVPB  2 g Intravenous Q24H    pantoprazole  40 mg Oral Daily    polyethylene glycol  17 g Oral Daily    pregabalin  50 mg Oral TID    senna-docusate 8.6-50 mg  1 tablet Oral Daily    sertraline  50 mg Oral Daily    vancomycin (VANCOCIN) IVPB  2,000 mg Intravenous Q12H       Social History:  Tobacco Use: High Risk    Smoking Tobacco Use: Current Some Day Smoker    Smokeless Tobacco Use: Never Used     OBJECTIVE:       Lab Results   Component Value Date    WBC 2.25 (L) 02/18/2022    HGB 9.4 (L) 02/18/2022    HCT 29.9 (L) 02/18/2022    MCV 87 02/18/2022    PLT 74 (L) 02/18/2022     BMP  Lab Results   Component Value Date     02/18/2022    K 4.3 02/18/2022     02/18/2022    CO2 24 02/18/2022    BUN 9 02/18/2022    CREATININE 0.5 02/18/2022    CALCIUM 7.9 (L) 02/18/2022    ANIONGAP 6 (L) 02/18/2022    ESTGFRAFRICA >60.0 02/18/2022    EGFRNONAA >60.0 02/18/2022     Lab Results   Component Value Date     ALT 15 02/18/2022    AST 51 (H) 02/18/2022    GGT 57 (H) 02/07/2022    ALKPHOS 109 02/18/2022    BILITOT 1.2 (H) 02/18/2022       Lab Results   Component Value Date    INR 1.3 (H) 02/18/2022    INR 1.2 02/17/2022    INR 1.3 (H) 02/16/2022       Lab Results   Component Value Date    HGBA1C 4.6 04/16/2021     2D ECHO:  TTE:  Results for orders placed or performed during the hospital encounter of 02/14/22   Echo    Narrative    · The left ventricle is normal in size with normal systolic function. The   estimated ejection fraction is 60%.  · Normal right ventricular size with normal right ventricular systolic   function.  · Normal left ventricular diastolic function.  · Biatrial enlargement.  · The estimated PA systolic pressure is 28 mmHg.  · Normal central venous pressure (3 mmHg).          ASSESSMENT/PLAN:         Anesthesia Evaluation    I have reviewed the Patient Summary Reports.    I have reviewed the Nursing Notes.    I have reviewed the Medications.     Review of Systems  Anesthesia Hx:  No problems with previous Anesthesia Denies Hx of Anesthetic complications  History of prior surgery of interest to airway management or planning: Denies Family Hx of Anesthesia complications.   Denies Personal Hx of Anesthesia complications.   Social:  Smoker, Alcohol Use IVDU meth  Marijuana   Hematology/Oncology:     Oncology Normal     Cardiovascular:   Denies Hypertension.   Denies Angina. ECG has been reviewed.    Pulmonary:   Denies Shortness of breath.  Denies Recent URI.    Renal/:  Renal/ Normal     Hepatic/GI:   Denies GERD. Liver Disease, Hepatitis, C    Musculoskeletal:  Spine Disorders:    Neurological:   Denies CVA. Denies Seizures.    Endocrine:  Endocrine Normal Denies Diabetes.    Psych:   Psychiatric History          Physical Exam  General:  Morbid Obesity    Airway/Jaw/Neck:  Airway Findings: Mouth Opening: Normal Tongue: Large  General Airway Assessment: Adult  Mallampati: II  TM Distance: Normal, at  least 6 cm  Jaw/Neck Findings:  Neck ROM: Normal ROM      Dental:  Dental Findings: In tact         Mental Status:  Mental Status Findings:  Cooperative, Alert and Oriented         Anesthesia Plan  Type of Anesthesia, risks & benefits discussed:  Anesthesia Type:  general    Patient's Preference:   Plan Factors:          Intra-op Monitoring Plan: standard ASA monitors and arterial line  Intra-op Monitoring Plan Comments:   Post Op Pain Control Plan: IV/PO Opioids PRN, multimodal analgesia and per primary service following discharge from PACU  Post Op Pain Control Plan Comments:     Induction:   IV  Beta Blocker:  Patient is not currently on a Beta-Blocker (No further documentation required).       Informed Consent: Patient understands risks and agrees with Anesthesia plan.  Questions answered. Anesthesia consent signed with patient.  ASA Score: 3     Day of Surgery Review of History & Physical:    H&P update referred to the surgeon.         Ready For Surgery From Anesthesia Perspective.

## 2022-02-18 NOTE — PROGRESS NOTES
[facility] - Neurosurgery ([facility])  Neurosurgery  Progress Note    Subjective:     History of Present Illness: Rachel Zazueta is a 41 y.o.  female with PMHx of Hepatitis C, liver cirrhosis, pancytopenia, and polysubstance abuse, who presents to clinic for follow up with new imaging s/p L3 and L4 laminectomy for evacuation of epidural abscess and L3-L5 TLIF on 04/16/2021.     The pt c/o worsening back and left leg pain since October. She states that she is no longer using IV drugs. States only using marijuana. Describes the left leg pain as a shock down the leg when standing for 10 minutes. Denies taking any antibiotics. Endorses new weakness in the legs. Denies b/b dysfunction. Denies new neck pain. She ambulates with a walker at home. States that she smoke.    She presents to ED as direct admit from clinic.       Post-Op Info:  Procedure(s) (LRB):  AIRO FUSION, SPINE T10-PELVIS (N/A)         Interval History: NAEON. Pt denies new complaints today. XR scoliosis pending. Pending OR Monday 2/21    Medications:  Continuous Infusions:    Scheduled Meds:   cefTRIAXone (ROCEPHIN) IVPB  2 g Intravenous Q24H    pantoprazole  40 mg Oral Daily    polyethylene glycol  17 g Oral Daily    pregabalin  50 mg Oral TID    senna-docusate 8.6-50 mg  1 tablet Oral Daily    sertraline  50 mg Oral Daily    vancomycin (VANCOCIN) IVPB  2,000 mg Intravenous Q12H     PRN Meds:acetaminophen, dextrose 10%, dextrose 10%, glucagon (human recombinant), glucose, glucose, glucose, HYDROmorphone, melatonin, oxyCODONE, Pharmacy to dose Vancomycin consult **AND** vancomycin - pharmacy to dose       Objective:     Weight: 131.5 kg (290 lb)  Body mass index is 45.42 kg/m².  Vital Signs (Most Recent):  Temp: 97.9 °F (36.6 °C) (02/18/22 1130)  Pulse: 87 (02/18/22 1130)  Resp: 13 (02/18/22 1130)  BP: (!) 111/53 (02/18/22 1130)  SpO2: 95 % (02/18/22 1130) Vital Signs (24h Range):  Temp:  [97.6 °F (36.4 °C)-98.1 °F (36.7 °C)] 97.9 °F (36.6  °C)  Pulse:  [80-90] 87  Resp:  [13-20] 13  SpO2:  [95 %-98 %] 95 %  BP: (107-123)/(53-67) 111/53                     Female External Urinary Catheter 02/14/22 2300 (Active)   Output (mL) 650 mL 02/15/22 0645       Physical Exam  General: well developed, well nourished, no distress.   Head: normocephalic, atraumatic  Neck: No tracheal deviation.   Neurologic: Alert and oriented. Thought content appropriate.  GCS: Motor: 6/Verbal: 5/Eyes: 4 GCS Total: 15  Mental Status: Awake, Alert, Oriented x 4  Language: No aphasia   Speech: No dysarthria  Cranial nerves: face symmetric, tongue midline, CN II-XII grossly intact, EOMI.   Pulmonary: normal respirations, no signs of respiratory distress  Abdomen: soft, non-distended, not tender to palpation  Sensory: intact to light touch throughout  Motor Strength: Moves all extremities spontaneously with good tone.  Pain limited weakness to proximal LLE. No abnormal movements seen.     Strength  Deltoids Triceps Biceps Wrist Extension Wrist Flexion Hand    Upper: R 5/5 5/5 5/5 5/5 5/5 5/5    L 5/5 5/5 5/5 5/5 5/5 5/5     Iliopsoas Quadriceps Knee  Flexion Tibialis  anterior Gastro- cnemius EHL   Lower: R 5/5 5/5 5/5 5/5 5/5 5/5    L 4+/5 4+/5 4+/5 5/5 5/5 5/5     Skin: Skin is warm, dry and intact.      Significant Labs:  Recent Labs   Lab 02/17/22 0301 02/18/22  0251   GLU 93 82    137   K 3.9 4.3    107   CO2 24 24   BUN 8 9   CREATININE 0.6 0.5   CALCIUM 8.6* 7.9*     Recent Labs   Lab 02/17/22 0301 02/18/22  0251   WBC 2.43* 2.25*   HGB 9.8* 9.4*   HCT 32.1* 29.9*   PLT 67* 74*     Recent Labs   Lab 02/17/22 0301 02/18/22  0251   INR 1.2 1.3*     Microbiology Results (last 7 days)     Procedure Component Value Units Date/Time    Blood Culture #2 **CANNOT BE ORDERED STAT** [[id number]] Collected: 02/14/22 1820    Order Status: Completed Specimen: Blood from Peripheral, Antecubital, Right Updated: 02/17/22 2012     Blood Culture, Routine No Growth to date       No Growth to date      No Growth to date      No Growth to date    Blood Culture #1 **CANNOT BE ORDERED STAT** [[id number]] Collected: 02/14/22 1820    Order Status: Completed Specimen: Blood from Peripheral, Forearm, Right Updated: 02/17/22 2012     Blood Culture, Routine No Growth to date      No Growth to date      No Growth to date      No Growth to date    Aerobic culture [[id number]] Collected: 02/15/22 1128    Order Status: Completed Specimen: Biopsy from Back Updated: 02/17/22 1409     Aerobic Bacterial Culture No growth    Narrative:      L3-4 / L4-5 osteodiscitis    Culture, Anaerobic [[id number]] Collected: 02/15/22 1128    Order Status: Completed Specimen: Biopsy from Back Updated: 02/17/22 0739     Anaerobic Culture Culture in progress    Narrative:      L3-4 / L4-5 osteodiscitis    AFB Culture & Smear [[id number]] Collected: 02/15/22 1128    Order Status: Completed Specimen: Biopsy from Back Updated: 02/16/22 2127     AFB Culture & Smear Culture in progress     AFB CULTURE STAIN No acid fast bacilli seen.    Narrative:      L3-4 / L4-5 osteodiscitis    Gram stain [[id number]] Collected: 02/15/22 1128    Order Status: Completed Specimen: Biopsy from Back Updated: 02/15/22 1815     Gram Stain Result Rare WBC's      No organisms seen    Narrative:      L3-4 / L4-5 osteodiscitis    Fungus culture [[id number]] Collected: 02/15/22 1128    Order Status: Sent Specimen: Biopsy from Back Updated: 02/15/22 1414    Blood culture [[id number]] Collected: 02/14/22 2033    Order Status: Sent Specimen: Blood from Peripheral, Hand, Left Updated: 02/14/22 2034    Blood culture [[id number]] Collected: 02/14/22 2033    Order Status: Sent Specimen: Blood from Peripheral, Antecubital, Left Updated: 02/14/22 2034        Recent Lab Results       02/18/22  0251        Albumin 2.1       Alkaline Phosphatase 109       ALT 15       Anion Gap 6       AST 51       Baso # 0.02       Basophil % 0.9       BILIRUBIN TOTAL 1.2  Comment: For  infants and newborns, interpretation of results should be based  on gestational age, weight and in agreement with clinical  observations.    Premature Infant recommended reference ranges:  Up to 24 hours.............<8.0 mg/dL  Up to 48 hours............<12.0 mg/dL  3-5 days..................<15.0 mg/dL  6-29 days.................<15.0 mg/dL         BUN 9       Calcium 7.9       Chloride 107       CO2 24       Creatinine 0.5       Differential Method Automated       eGFR if  >60.0       eGFR if non  >60.0  Comment: Calculation used to obtain the estimated glomerular filtration  rate (eGFR) is the CKD-EPI equation.          Eos # 0.1       Eosinophil % 3.6       Glucose 82       Gran # (ANC) 1.4       Gran % 62.7       Hematocrit 29.9       Hemoglobin 9.4       Immature Grans (Abs) 0.01  Comment: Mild elevation in immature granulocytes is non specific and   can be seen in a variety of conditions including stress response,   acute inflammation, trauma and pregnancy. Correlation with other   laboratory and clinical findings is essential.         Immature Granulocytes 0.4       INR 1.3  Comment: Coumadin Therapy:  2.0 - 3.0 for INR for all indicators except mechanical heart valves  and antiphospholipid syndromes which should use 2.5 - 3.5.         Lymph # 0.5       Lymph % 24.0       MCH 27.3       MCHC 31.4       MCV 87       Mono # 0.2       Mono % 8.4       MPV 8.7       nRBC 0       Platelets 74       Potassium 4.3       PROTEIN TOTAL 7.6       Protime 12.8       RBC 3.44       RDW 18.0       Sodium 137       WBC 2.25           All pertinent labs from the last 24 hours have been reviewed.    Significant Diagnostics:  I have reviewed all pertinent imaging results/findings within the past 24 hours.          Assessment/Plan:     Spondylodiscitis  Rachel Zazueta is a 41 F with PMH hepatitis C, cirrhosis, pancytopenia, nicotine abuse, hx IVDU (last use 2 years ago), strep agalactae  bacteremia, s/p L3-5 TLIF on 4/16/2021 who presents with persistent and progressive lumbar spondylodiscitis with resultant hardware failure and new scoliosis.     --Patient admitted to NPU on telemetry;       -q4h neurochecks on floor  --Plan for OR 2/21 for hardware removal, washout, extension of previous fusion.  --XR scoliosis today  --XR lumbar spine flex/ext completed.   --All labs and diagnostics reviewed  --Follow-up MRI Brain, C/T/L w/wo contrast   -No evidence of osteomyelitis, discitis, or epidural abscess within the cervical or thoracic spine   -No diffusion positive or abnormal enhancement within the brain   -MRI brain shows patchy nonspecific increased T2 and FLAIR signal in the periventricular and subcortical white matter bilaterally. Could be due to chronic microvascular ischemic changes vs demyelinating disease given patient's age. No evidence of active demyelination.   -DDD C4-7 with mild to moderate canal stenosis  --Full infectious w/u    -ESR 57, CRP 29, Procal 0.05, Lactate 1.1, BCx 2/14 NGTD, UA/Ucx pending   -F/u ID recs - started on empiric abx, continue.  --S/p L3-4 disc biopsy and L3 bone biopsy with IR 2/15 - Gram stain rare WBC. Cx NGTD.  -- consult for risk stratification and medical optimization. Appreciate assistance. RCRI 0, 3.9% risk of perioperative cardiac complication.   --Utox + for amphetamines, addiction psych consulted per .   --Hold anti-plt/coag medications  --Monitor for urinary retention - voiding spontaneous. Bladder scan prn.  --Continue current regimen for pain control   --Continue to monitor clinically, notify NSGY immediately with any changes in neuro status    Dispo: pending surgical intervention         Juliane Valero PA-C  Neurosurgery  [facility] - Neurosurgery ([facility])

## 2022-02-18 NOTE — PLAN OF CARE
40 y/o F with PMH of cirrhosis, Hep C, pancytopenia, polysubstance abuse, and epidural abscess s/p L3-L4 laminectomy and L3-L5 TLIF (4/2021; blood and surgical cultures positive for group B strep) admitted from NSGY clinic with progressive lumbar spondylodiscitis and hardware failure. IM6 consulted for preop risk stratification and medical optimization.     Pt doing well this morning, no acute complaints. Denies CP, SOB, abdominal pain. Awaiting surgery on Monday.     - RCRI 0   - f/u blood cultures and biopsy cultures, negative so far  - continue antibiotics per ID recs  - daily CBC, CMP, INR   - Tbili trending down and MELD 10 today   - will need outpatient hepatology referral        Cecille Rhodes MD

## 2022-02-18 NOTE — SUBJECTIVE & OBJECTIVE
Interval History: NAEON. Pt denies new complaints today. XR scoliosis pending. Pending OR Monday 2/21    Medications:  Continuous Infusions:    Scheduled Meds:   cefTRIAXone (ROCEPHIN) IVPB  2 g Intravenous Q24H    pantoprazole  40 mg Oral Daily    polyethylene glycol  17 g Oral Daily    pregabalin  50 mg Oral TID    senna-docusate 8.6-50 mg  1 tablet Oral Daily    sertraline  50 mg Oral Daily    vancomycin (VANCOCIN) IVPB  2,000 mg Intravenous Q12H     PRN Meds:acetaminophen, dextrose 10%, dextrose 10%, glucagon (human recombinant), glucose, glucose, glucose, HYDROmorphone, melatonin, oxyCODONE, Pharmacy to dose Vancomycin consult **AND** vancomycin - pharmacy to dose       Objective:     Weight: 131.5 kg (290 lb)  Body mass index is 45.42 kg/m².  Vital Signs (Most Recent):  Temp: 97.9 °F (36.6 °C) (02/18/22 1130)  Pulse: 87 (02/18/22 1130)  Resp: 13 (02/18/22 1130)  BP: (!) 111/53 (02/18/22 1130)  SpO2: 95 % (02/18/22 1130) Vital Signs (24h Range):  Temp:  [97.6 °F (36.4 °C)-98.1 °F (36.7 °C)] 97.9 °F (36.6 °C)  Pulse:  [80-90] 87  Resp:  [13-20] 13  SpO2:  [95 %-98 %] 95 %  BP: (107-123)/(53-67) 111/53                     Female External Urinary Catheter 02/14/22 2300 (Active)   Output (mL) 650 mL 02/15/22 0645       Physical Exam  General: well developed, well nourished, no distress.   Head: normocephalic, atraumatic  Neck: No tracheal deviation.   Neurologic: Alert and oriented. Thought content appropriate.  GCS: Motor: 6/Verbal: 5/Eyes: 4 GCS Total: 15  Mental Status: Awake, Alert, Oriented x 4  Language: No aphasia   Speech: No dysarthria  Cranial nerves: face symmetric, tongue midline, CN II-XII grossly intact, EOMI.   Pulmonary: normal respirations, no signs of respiratory distress  Abdomen: soft, non-distended, not tender to palpation  Sensory: intact to light touch throughout  Motor Strength: Moves all extremities spontaneously with good tone.  Pain limited weakness to proximal LLE. No abnormal  movements seen.     Strength  Deltoids Triceps Biceps Wrist Extension Wrist Flexion Hand    Upper: R 5/5 5/5 5/5 5/5 5/5 5/5    L 5/5 5/5 5/5 5/5 5/5 5/5     Iliopsoas Quadriceps Knee  Flexion Tibialis  anterior Gastro- cnemius EHL   Lower: R 5/5 5/5 5/5 5/5 5/5 5/5    L 4+/5 4+/5 4+/5 5/5 5/5 5/5     Skin: Skin is warm, dry and intact.      Significant Labs:  Recent Labs   Lab 02/17/22 0301 02/18/22  0251   GLU 93 82    137   K 3.9 4.3    107   CO2 24 24   BUN 8 9   CREATININE 0.6 0.5   CALCIUM 8.6* 7.9*     Recent Labs   Lab 02/17/22 0301 02/18/22  0251   WBC 2.43* 2.25*   HGB 9.8* 9.4*   HCT 32.1* 29.9*   PLT 67* 74*     Recent Labs   Lab 02/17/22 0301 02/18/22  0251   INR 1.2 1.3*     Microbiology Results (last 7 days)     Procedure Component Value Units Date/Time    Blood Culture #2 **CANNOT BE ORDERED STAT** [[id number]] Collected: 02/14/22 1820    Order Status: Completed Specimen: Blood from Peripheral, Antecubital, Right Updated: 02/17/22 2012     Blood Culture, Routine No Growth to date      No Growth to date      No Growth to date      No Growth to date    Blood Culture #1 **CANNOT BE ORDERED STAT** [[id number]] Collected: 02/14/22 1820    Order Status: Completed Specimen: Blood from Peripheral, Forearm, Right Updated: 02/17/22 2012     Blood Culture, Routine No Growth to date      No Growth to date      No Growth to date      No Growth to date    Aerobic culture [[id number]] Collected: 02/15/22 1128    Order Status: Completed Specimen: Biopsy from Back Updated: 02/17/22 1409     Aerobic Bacterial Culture No growth    Narrative:      L3-4 / L4-5 osteodiscitis    Culture, Anaerobic [[id number]] Collected: 02/15/22 1128    Order Status: Completed Specimen: Biopsy from Back Updated: 02/17/22 0739     Anaerobic Culture Culture in progress    Narrative:      L3-4 / L4-5 osteodiscitis    AFB Culture & Smear [[id number]] Collected: 02/15/22 1128    Order Status: Completed Specimen: Biopsy from Back  Updated: 02/16/22 2127     AFB Culture & Smear Culture in progress     AFB CULTURE STAIN No acid fast bacilli seen.    Narrative:      L3-4 / L4-5 osteodiscitis    Gram stain [[id number]] Collected: 02/15/22 1128    Order Status: Completed Specimen: Biopsy from Back Updated: 02/15/22 1815     Gram Stain Result Rare WBC's      No organisms seen    Narrative:      L3-4 / L4-5 osteodiscitis    Fungus culture [[id number]] Collected: 02/15/22 1128    Order Status: Sent Specimen: Biopsy from Back Updated: 02/15/22 1414    Blood culture [[id number]] Collected: 02/14/22 2033    Order Status: Sent Specimen: Blood from Peripheral, Hand, Left Updated: 02/14/22 2034    Blood culture [[id number]] Collected: 02/14/22 2033    Order Status: Sent Specimen: Blood from Peripheral, Antecubital, Left Updated: 02/14/22 2034        Recent Lab Results       02/18/22  0251        Albumin 2.1       Alkaline Phosphatase 109       ALT 15       Anion Gap 6       AST 51       Baso # 0.02       Basophil % 0.9       BILIRUBIN TOTAL 1.2  Comment: For infants and newborns, interpretation of results should be based  on gestational age, weight and in agreement with clinical  observations.    Premature Infant recommended reference ranges:  Up to 24 hours.............<8.0 mg/dL  Up to 48 hours............<12.0 mg/dL  3-5 days..................<15.0 mg/dL  6-29 days.................<15.0 mg/dL         BUN 9       Calcium 7.9       Chloride 107       CO2 24       Creatinine 0.5       Differential Method Automated       eGFR if  >60.0       eGFR if non  >60.0  Comment: Calculation used to obtain the estimated glomerular filtration  rate (eGFR) is the CKD-EPI equation.          Eos # 0.1       Eosinophil % 3.6       Glucose 82       Gran # (ANC) 1.4       Gran % 62.7       Hematocrit 29.9       Hemoglobin 9.4       Immature Grans (Abs) 0.01  Comment: Mild elevation in immature granulocytes is non specific and   can be seen in a  variety of conditions including stress response,   acute inflammation, trauma and pregnancy. Correlation with other   laboratory and clinical findings is essential.         Immature Granulocytes 0.4       INR 1.3  Comment: Coumadin Therapy:  2.0 - 3.0 for INR for all indicators except mechanical heart valves  and antiphospholipid syndromes which should use 2.5 - 3.5.         Lymph # 0.5       Lymph % 24.0       MCH 27.3       MCHC 31.4       MCV 87       Mono # 0.2       Mono % 8.4       MPV 8.7       nRBC 0       Platelets 74       Potassium 4.3       PROTEIN TOTAL 7.6       Protime 12.8       RBC 3.44       RDW 18.0       Sodium 137       WBC 2.25           All pertinent labs from the last 24 hours have been reviewed.    Significant Diagnostics:  I have reviewed all pertinent imaging results/findings within the past 24 hours.

## 2022-02-18 NOTE — PROGRESS NOTES
Pharmacokinetic Assessment Follow Up: IV Vancomycin    Vancomycin Regimen Assessment & Plan:  - Vancomycin trough level (11-hour level) resulted at 11.3 mcg/mL, which is considered subtherapeutic (goal: 15-20 mcg/mL)  - Stable serum creatinine  - Change from vancomycin 2000 mg IV every 12 hours to 2250 mg IV every 12 hours  - Next vancomycin level scheduled prior to the fourth dose on 2/20 at 04:00 or sooner if change in renal function      Drug levels (last 3 results):  Recent Labs   Lab Result Units 02/17/22  0301 02/18/22  1513   Vancomycin-Trough ug/mL 15.5 11.3       Pharmacy will continue to follow and monitor vancomycin.    Please contact pharmacy at extension [phone] for questions regarding this assessment.    Thank you for the consult,   Kenna Marks       Patient brief summary:  Rachel Zazueta is a 41 y.o. female initiated on antimicrobial therapy with IV Vancomycin for treatment of bone/joint infection      Drug Allergies:   Review of patient's allergies indicates:   Allergen Reactions    Bee venom protein (honey bee) Anaphylaxis     Patient reports she is allergic to bee stings.    Wasp sting [allergen ext-venom-honey bee] Anaphylaxis    Adhesive Blisters    Strawberry Hives     Patient reports itching and hives    Iodine and iodide containing products Hives    Naproxen Other (See Comments)     Throat closing    Shellfish containing products     Nuts [tree nut] Rash       Actual Body Weight:   131.5 kg    Renal Function:   Estimated Creatinine Clearance: 209.4 mL/min (based on SCr of 0.5 mg/dL).,     Dialysis Method (if applicable):  N/A    CBC (last 72 hours):  Recent Labs   Lab Result Units 02/16/22  0640 02/17/22  0301 02/18/22  0251   WBC K/uL 2.81* 2.43* 2.25*   Hemoglobin g/dL 9.7* 9.8* 9.4*   Hematocrit % 31.0* 32.1* 29.9*   Platelets K/uL 61* 67* 74*   Gran % % 68.6 63.7 62.7   Lymph % % 17.8* 23.5 24.0   Mono % % 10.7 9.1 8.4   Eosinophil % % 1.8 2.5 3.6   Basophil % % 0.4 0.8 0.9    Differential Method  Automated Automated Automated       Metabolic Panel (last 72 hours):  Recent Labs   Lab Result Units 02/16/22  0640 02/16/22  1206 02/17/22  0301 02/18/22  0251   Sodium mmol/L 133*  --  140 137   Potassium mmol/L 3.7  --  3.9 4.3   Chloride mmol/L 104  --  108 107   CO2 mmol/L 23  --  24 24   Glucose mg/dL 130*  --  93 82   BUN mg/dL 10  --  8 9   Creatinine mg/dL 0.6  --  0.6 0.5   Creatinine, Urine mg/dL  --  34.0  --   --    Albumin g/dL 2.1*  --  2.2* 2.1*   Total Bilirubin mg/dL 1.4*  --  1.3* 1.2*   Alkaline Phosphatase U/L 111  --  112 109   AST U/L 35  --  44* 51*   ALT U/L 11  --  16 15       Vancomycin Administrations:  vancomycin given in the last 96 hours                   vancomycin 2 g in dextrose 5 % 500 mL IVPB (mg) 2,000 mg New Bag 02/18/22 0403     2,000 mg New Bag 02/17/22 1649     2,000 mg New Bag  0423     2,000 mg New Bag 02/16/22 1600     2,000 mg New Bag  0515      Restarted 02/15/22 1755     2,000 mg New Bag  1559                Microbiologic Results:  Microbiology Results (last 7 days)     Procedure Component Value Units Date/Time    Blood Culture #2 **CANNOT BE ORDERED STAT** [[id number]] Collected: 02/14/22 1820    Order Status: Completed Specimen: Blood from Peripheral, Antecubital, Right Updated: 02/17/22 2012     Blood Culture, Routine No Growth to date      No Growth to date      No Growth to date      No Growth to date    Blood Culture #1 **CANNOT BE ORDERED STAT** [[id number]] Collected: 02/14/22 1820    Order Status: Completed Specimen: Blood from Peripheral, Forearm, Right Updated: 02/17/22 2012     Blood Culture, Routine No Growth to date      No Growth to date      No Growth to date      No Growth to date    Aerobic culture [[id number]] Collected: 02/15/22 1128    Order Status: Completed Specimen: Biopsy from Back Updated: 02/17/22 1409     Aerobic Bacterial Culture No growth    Narrative:      L3-4 / L4-5 osteodiscitis    Culture, Anaerobic [[id number]]  Collected: 02/15/22 1128    Order Status: Completed Specimen: Biopsy from Back Updated: 02/17/22 0739     Anaerobic Culture Culture in progress    Narrative:      L3-4 / L4-5 osteodiscitis    AFB Culture & Smear [[id number]] Collected: 02/15/22 1128    Order Status: Completed Specimen: Biopsy from Back Updated: 02/16/22 2127     AFB Culture & Smear Culture in progress     AFB CULTURE STAIN No acid fast bacilli seen.    Narrative:      L3-4 / L4-5 osteodiscitis    Gram stain [[id number]] Collected: 02/15/22 1128    Order Status: Completed Specimen: Biopsy from Back Updated: 02/15/22 1815     Gram Stain Result Rare WBC's      No organisms seen    Narrative:      L3-4 / L4-5 osteodiscitis    Fungus culture [[id number]] Collected: 02/15/22 1128    Order Status: Sent Specimen: Biopsy from Back Updated: 02/15/22 1414    Blood culture [[id number]] Collected: 02/14/22 2033    Order Status: Sent Specimen: Blood from Peripheral, Hand, Left Updated: 02/14/22 2034    Blood culture [[id number]] Collected: 02/14/22 2033    Order Status: Sent Specimen: Blood from Peripheral, Antecubital, Left Updated: 02/14/22 2034

## 2022-02-19 LAB
ALBUMIN SERPL BCP-MCNC: 2.2 G/DL (ref 3.5–5.2)
ALP SERPL-CCNC: 115 U/L (ref 55–135)
ALT SERPL W/O P-5'-P-CCNC: 16 U/L (ref 10–44)
ANION GAP SERPL CALC-SCNC: 8 MMOL/L (ref 8–16)
AST SERPL-CCNC: 48 U/L (ref 10–40)
BACTERIA BLD CULT: NORMAL
BACTERIA BLD CULT: NORMAL
BACTERIA SPEC AEROBE CULT: NO GROWTH
BASOPHILS # BLD AUTO: 0.02 K/UL (ref 0–0.2)
BASOPHILS NFR BLD: 0.9 % (ref 0–1.9)
BILIRUB SERPL-MCNC: 1.4 MG/DL (ref 0.1–1)
BUN SERPL-MCNC: 7 MG/DL (ref 6–20)
CALCIUM SERPL-MCNC: 8.4 MG/DL (ref 8.7–10.5)
CHLORIDE SERPL-SCNC: 107 MMOL/L (ref 95–110)
CO2 SERPL-SCNC: 25 MMOL/L (ref 23–29)
CREAT SERPL-MCNC: 0.6 MG/DL (ref 0.5–1.4)
DIFFERENTIAL METHOD: ABNORMAL
EOSINOPHIL # BLD AUTO: 0.1 K/UL (ref 0–0.5)
EOSINOPHIL NFR BLD: 4.7 % (ref 0–8)
ERYTHROCYTE [DISTWIDTH] IN BLOOD BY AUTOMATED COUNT: 18.1 % (ref 11.5–14.5)
EST. GFR  (AFRICAN AMERICAN): >60 ML/MIN/1.73 M^2
EST. GFR  (NON AFRICAN AMERICAN): >60 ML/MIN/1.73 M^2
GLUCOSE SERPL-MCNC: 80 MG/DL (ref 70–110)
HCT VFR BLD AUTO: 34.3 % (ref 37–48.5)
HGB BLD-MCNC: 10.5 G/DL (ref 12–16)
IMM GRANULOCYTES # BLD AUTO: 0.02 K/UL (ref 0–0.04)
IMM GRANULOCYTES NFR BLD AUTO: 0.9 % (ref 0–0.5)
INR PPP: 1.3 (ref 0.8–1.2)
LYMPHOCYTES # BLD AUTO: 0.5 K/UL (ref 1–4.8)
LYMPHOCYTES NFR BLD: 21.8 % (ref 18–48)
MCH RBC QN AUTO: 27.1 PG (ref 27–31)
MCHC RBC AUTO-ENTMCNC: 30.6 G/DL (ref 32–36)
MCV RBC AUTO: 88 FL (ref 82–98)
MONOCYTES # BLD AUTO: 0.2 K/UL (ref 0.3–1)
MONOCYTES NFR BLD: 8.5 % (ref 4–15)
NEUTROPHILS # BLD AUTO: 1.5 K/UL (ref 1.8–7.7)
NEUTROPHILS NFR BLD: 63.2 % (ref 38–73)
NRBC BLD-RTO: 0 /100 WBC
PLATELET # BLD AUTO: 100 K/UL (ref 150–450)
PMV BLD AUTO: 9.6 FL (ref 9.2–12.9)
POTASSIUM SERPL-SCNC: 4.2 MMOL/L (ref 3.5–5.1)
PROT SERPL-MCNC: 7.8 G/DL (ref 6–8.4)
PROTHROMBIN TIME: 13.5 SEC (ref 9–12.5)
RBC # BLD AUTO: 3.88 M/UL (ref 4–5.4)
SODIUM SERPL-SCNC: 140 MMOL/L (ref 136–145)
WBC # BLD AUTO: 2.34 K/UL (ref 3.9–12.7)

## 2022-02-19 PROCEDURE — 11000001 HC ACUTE MED/SURG PRIVATE ROOM

## 2022-02-19 PROCEDURE — 80053 COMPREHEN METABOLIC PANEL: CPT

## 2022-02-19 PROCEDURE — 25000003 PHARM REV CODE 250: Performed by: NEUROLOGICAL SURGERY

## 2022-02-19 PROCEDURE — 63600175 PHARM REV CODE 636 W HCPCS: Performed by: NEUROLOGICAL SURGERY

## 2022-02-19 PROCEDURE — 63600175 PHARM REV CODE 636 W HCPCS: Performed by: PHYSICIAN ASSISTANT

## 2022-02-19 PROCEDURE — 36415 COLL VENOUS BLD VENIPUNCTURE: CPT

## 2022-02-19 PROCEDURE — 99232 SBSQ HOSP IP/OBS MODERATE 35: CPT | Mod: ,,, | Performed by: NEUROLOGICAL SURGERY

## 2022-02-19 PROCEDURE — 25000003 PHARM REV CODE 250: Performed by: STUDENT IN AN ORGANIZED HEALTH CARE EDUCATION/TRAINING PROGRAM

## 2022-02-19 PROCEDURE — 99232 PR SUBSEQUENT HOSPITAL CARE,LEVL II: ICD-10-PCS | Mod: ,,, | Performed by: NEUROLOGICAL SURGERY

## 2022-02-19 PROCEDURE — 85025 COMPLETE CBC W/AUTO DIFF WBC: CPT | Performed by: STUDENT IN AN ORGANIZED HEALTH CARE EDUCATION/TRAINING PROGRAM

## 2022-02-19 PROCEDURE — 85610 PROTHROMBIN TIME: CPT

## 2022-02-19 PROCEDURE — 25000003 PHARM REV CODE 250

## 2022-02-19 RX ORDER — POTASSIUM CHLORIDE 20 MEQ/1
40 TABLET, EXTENDED RELEASE ORAL 3 TIMES DAILY
Status: COMPLETED | OUTPATIENT
Start: 2022-02-19 | End: 2022-02-19

## 2022-02-19 RX ADMIN — HEPARIN SODIUM 7500 UNITS: 5000 INJECTION INTRAVENOUS; SUBCUTANEOUS at 06:02

## 2022-02-19 RX ADMIN — OXYCODONE 5 MG: 5 TABLET ORAL at 07:02

## 2022-02-19 RX ADMIN — PREGABALIN 50 MG: 50 CAPSULE ORAL at 08:02

## 2022-02-19 RX ADMIN — POTASSIUM CHLORIDE 40 MEQ: 1500 TABLET, EXTENDED RELEASE ORAL at 07:02

## 2022-02-19 RX ADMIN — OXYCODONE 5 MG: 5 TABLET ORAL at 02:02

## 2022-02-19 RX ADMIN — CEFTRIAXONE SODIUM 2 G: 2 INJECTION, SOLUTION INTRAVENOUS at 02:02

## 2022-02-19 RX ADMIN — PREGABALIN 50 MG: 50 CAPSULE ORAL at 07:02

## 2022-02-19 RX ADMIN — POTASSIUM CHLORIDE 40 MEQ: 1500 TABLET, EXTENDED RELEASE ORAL at 09:02

## 2022-02-19 RX ADMIN — PANTOPRAZOLE SODIUM 40 MG: 20 TABLET, DELAYED RELEASE ORAL at 07:02

## 2022-02-19 RX ADMIN — HEPARIN SODIUM 7500 UNITS: 5000 INJECTION INTRAVENOUS; SUBCUTANEOUS at 02:02

## 2022-02-19 RX ADMIN — VANCOMYCIN HYDROCHLORIDE 2250 MG: 1.25 INJECTION, POWDER, LYOPHILIZED, FOR SOLUTION INTRAVENOUS at 06:02

## 2022-02-19 RX ADMIN — PREGABALIN 50 MG: 50 CAPSULE ORAL at 02:02

## 2022-02-19 RX ADMIN — POTASSIUM CHLORIDE 40 MEQ: 1500 TABLET, EXTENDED RELEASE ORAL at 02:02

## 2022-02-19 RX ADMIN — HEPARIN SODIUM 7500 UNITS: 5000 INJECTION INTRAVENOUS; SUBCUTANEOUS at 10:02

## 2022-02-19 RX ADMIN — SERTRALINE HYDROCHLORIDE 50 MG: 50 TABLET ORAL at 07:02

## 2022-02-19 RX ADMIN — OXYCODONE 5 MG: 5 TABLET ORAL at 09:02

## 2022-02-19 RX ADMIN — SENNOSIDES AND DOCUSATE SODIUM 1 TABLET: 50; 8.6 TABLET ORAL at 07:02

## 2022-02-19 NOTE — PROGRESS NOTES
[facility] - Neurosurgery ([facility])  Neurosurgery  Progress Note    Subjective:     History of Present Illness: Rachel Zazueta is a 41 y.o.  female with PMHx of Hepatitis C, liver cirrhosis, pancytopenia, and polysubstance abuse, who presents to clinic for follow up with new imaging s/p L3 and L4 laminectomy for evacuation of epidural abscess and L3-L5 TLIF on 04/16/2021.     The pt c/o worsening back and left leg pain since October. She states that she is no longer using IV drugs. States only using marijuana. Describes the left leg pain as a shock down the leg when standing for 10 minutes. Denies taking any antibiotics. Endorses new weakness in the legs. Denies b/b dysfunction. Denies new neck pain. She ambulates with a walker at home. States that she smoke.    She presents to ED as direct admit from clinic.       Post-Op Info:  Procedure(s) (LRB):  AIRO FUSION, SPINE T10-PELVIS (N/A)         Interval History: 2/19: NAEON. AFVSS. Exam stable. Pain controlled. Tolerating PO. Voiding and stooling without difficulty. Plan for OR Monday.     Medications:  Continuous Infusions:    Scheduled Meds:   cefTRIAXone (ROCEPHIN) IVPB  2 g Intravenous Q24H    heparin (porcine)  7,500 Units Subcutaneous Q8H    pantoprazole  40 mg Oral Daily    polyethylene glycol  17 g Oral Daily    potassium chloride  40 mEq Oral TID    pregabalin  50 mg Oral TID    senna-docusate 8.6-50 mg  1 tablet Oral Daily    sertraline  50 mg Oral Daily    vancomycin (VANCOCIN) IVPB  2,250 mg Intravenous Q12H     PRN Meds:acetaminophen, dextrose 10%, dextrose 10%, glucagon (human recombinant), glucose, glucose, glucose, HYDROmorphone, melatonin, oxyCODONE, Pharmacy to dose Vancomycin consult **AND** vancomycin - pharmacy to dose       Objective:     Weight: 131.5 kg (290 lb)  Body mass index is 45.42 kg/m².  Vital Signs (Most Recent):  Temp: 98.2 °F (36.8 °C) (02/19/22 0346)  Pulse: 89 (02/19/22 0346)  Resp: 18 (02/19/22 0346)  BP: 131/71  (02/19/22 0346)  SpO2: 99 % (02/19/22 0346) Vital Signs (24h Range):  Temp:  [96.4 °F (35.8 °C)-98.6 °F (37 °C)] 98.2 °F (36.8 °C)  Pulse:  [84-89] 89  Resp:  [13-20] 18  SpO2:  [94 %-99 %] 99 %  BP: (110-136)/(53-71) 131/71                     Female External Urinary Catheter 02/14/22 2300 (Active)   Output (mL) 650 mL 02/15/22 0645       Physical ExamGeneral: well developed, well nourished, no distress.   Head: normocephalic, atraumatic  Neck: No tracheal deviation.   Neurologic: Alert and oriented. Thought content appropriate.  GCS: Motor: 6/Verbal: 5/Eyes: 4 GCS Total: 15  Mental Status: Awake, Alert, Oriented x 4  Language: No aphasia   Speech: No dysarthria  Cranial nerves: face symmetric, tongue midline, CN II-XII grossly intact, EOMI.   Pulmonary: normal respirations, no signs of respiratory distress  Abdomen: soft, non-distended, not tender to palpation  Sensory: intact to light touch throughout  Motor Strength: Moves all extremities spontaneously with good tone.  Pain limited weakness to proximal LLE. No abnormal movements seen.     Strength  Deltoids Triceps Biceps Wrist Extension Wrist Flexion Hand    Upper: R 5/5 5/5 5/5 5/5 5/5 5/5    L 5/5 5/5 5/5 5/5 5/5 5/5     Iliopsoas Quadriceps Knee  Flexion Tibialis  anterior Gastro- cnemius EHL   Lower: R 5/5 5/5 5/5 5/5 5/5 5/5    L 4+/5 4+/5 4+/5 5/5 5/5 5/5     Skin: Skin is warm, dry and intact.      Significant Labs:  Recent Labs   Lab 02/18/22 0251   GLU 82      K 4.3      CO2 24   BUN 9   CREATININE 0.5   CALCIUM 7.9*       Recent Labs   Lab 02/18/22 0251   WBC 2.25*   HGB 9.4*   HCT 29.9*   PLT 74*       Recent Labs   Lab 02/18/22 0251 02/19/22  0322   INR 1.3* 1.3*       Microbiology Results (last 7 days)       Procedure Component Value Units Date/Time    Blood Culture #1 **CANNOT BE ORDERED STAT** [[id number]] Collected: 02/14/22 1820    Order Status: Completed Specimen: Blood from Peripheral, Forearm, Right Updated: 02/18/22 2012      Blood Culture, Routine No Growth to date      No Growth to date      No Growth to date      No Growth to date      No Growth to date    Blood Culture #2 **CANNOT BE ORDERED STAT** [[id number]] Collected: 02/14/22 1820    Order Status: Completed Specimen: Blood from Peripheral, Antecubital, Right Updated: 02/18/22 2012     Blood Culture, Routine No Growth to date      No Growth to date      No Growth to date      No Growth to date      No Growth to date    Aerobic culture [[id number]] Collected: 02/15/22 1128    Order Status: Completed Specimen: Biopsy from Back Updated: 02/17/22 1409     Aerobic Bacterial Culture No growth    Narrative:      L3-4 / L4-5 osteodiscitis    Culture, Anaerobic [[id number]] Collected: 02/15/22 1128    Order Status: Completed Specimen: Biopsy from Back Updated: 02/17/22 0739     Anaerobic Culture Culture in progress    Narrative:      L3-4 / L4-5 osteodiscitis    AFB Culture & Smear [[id number]] Collected: 02/15/22 1128    Order Status: Completed Specimen: Biopsy from Back Updated: 02/16/22 2127     AFB Culture & Smear Culture in progress     AFB CULTURE STAIN No acid fast bacilli seen.    Narrative:      L3-4 / L4-5 osteodiscitis    Gram stain [[id number]] Collected: 02/15/22 1128    Order Status: Completed Specimen: Biopsy from Back Updated: 02/15/22 1815     Gram Stain Result Rare WBC's      No organisms seen    Narrative:      L3-4 / L4-5 osteodiscitis    Fungus culture [[id number]] Collected: 02/15/22 1128    Order Status: Sent Specimen: Biopsy from Back Updated: 02/15/22 1414    Blood culture [[id number]] Collected: 02/14/22 2033    Order Status: Sent Specimen: Blood from Peripheral, Hand, Left Updated: 02/14/22 2034    Blood culture [[id number]] Collected: 02/14/22 2033    Order Status: Sent Specimen: Blood from Peripheral, Antecubital, Left Updated: 02/14/22 2034          Recent Lab Results         02/19/22  0322   02/18/22  1513        INR 1.3  Comment: Coumadin Therapy:  2.0 - 3.0  for INR for all indicators except mechanical heart valves  and antiphospholipid syndromes which should use 2.5 - 3.5.           Protime 13.5         Vancomycin-Trough   11.3             All pertinent labs from the last 24 hours have been reviewed.    Significant Diagnostics:  I have reviewed all pertinent imaging results/findings within the past 24 hours.  No results found in the last 24 hours.      Assessment/Plan:     Spondylodiscitis  Rachel Zazueta is a 41 F with PMH hepatitis C, cirrhosis, pancytopenia, nicotine abuse, hx IVDU (last use 2 years ago), strep agalactae bacteremia, s/p L3-5 TLIF on 4/16/2021 who presents with persistent and progressive lumbar spondylodiscitis with resultant hardware failure and new scoliosis.     --Patient admitted to NPU on telemetry;       -q4h neurochecks on floor  --Plan for OR 2/21 for hardware removal, washout, extension of previous fusion.  --XR scoliosis today  --XR lumbar spine flex/ext completed.   --All labs and diagnostics reviewed  --Follow-up MRI Brain, C/T/L w/wo contrast   -No evidence of osteomyelitis, discitis, or epidural abscess within the cervical or thoracic spine   -No diffusion positive or abnormal enhancement within the brain   -MRI brain shows patchy nonspecific increased T2 and FLAIR signal in the periventricular and subcortical white matter bilaterally. Could be due to chronic microvascular ischemic changes vs demyelinating disease given patient's age. No evidence of active demyelination.   -DDD C4-7 with mild to moderate canal stenosis  --Full infectious w/u    -ESR 57, CRP 29, Procal 0.05, Lactate 1.1, BCx 2/14 NGTD, UA/Ucx pending   -F/u ID recs - started on empiric abx, continue.  --S/p L3-4 disc biopsy and L3 bone biopsy with IR 2/15 - Gram stain rare WBC. Cx NGTD.  -- consult for risk stratification and medical optimization. Appreciate assistance. RCRI 0, 3.9% risk of perioperative cardiac complication.   --Utox + for amphetamines, addiction  psych consulted per HM.   --Hold anti-plt/coag medications  --Monitor for urinary retention - voiding spontaneous. Bladder scan prn.  --Continue current regimen for pain control   --Continue to monitor clinically, notify NSGY immediately with any changes in neuro status    Dispo: pending surgical intervention         Lee Patel MD  Neurosurgery  [facility] - Neurosurgery ([facility])

## 2022-02-19 NOTE — PLAN OF CARE
"Patient chart reviewed this morning.    40 y/o F with PMH of cirrhosis, Hep C, pancytopenia, polysubstance abuse, and epidural abscess s/p L3-L4 laminectomy and L3-L5 TLIF (4/2021; blood and surgical cultures positive for group B strep) admitted from [facility] clinic with progressive lumbar spondylodiscitis and hardware failure. IM6 consulted for preop risk stratification and medical optimization.        - RCRI 0   - f/u blood cultures and biopsy cultures, negative so far  - continue rocephin and vancomycin per ID recs  - daily CBC, CMP, INR   - will need outpatient hepatology referral      Please secure [facility] Medicine  ("Medicine Consult Only") or contact me directly for any additional questions or concerns.    .Margareth Elizabeth MD  PGY2 Internal Medicine  [facility] Medicine Consult Service  "

## 2022-02-19 NOTE — SUBJECTIVE & OBJECTIVE
Interval History: 2/19: NAEON. AFVSS. Exam stable. Pain controlled. Tolerating PO. Voiding and stooling without difficulty. Plan for OR Monday.     Medications:  Continuous Infusions:    Scheduled Meds:   cefTRIAXone (ROCEPHIN) IVPB  2 g Intravenous Q24H    heparin (porcine)  7,500 Units Subcutaneous Q8H    pantoprazole  40 mg Oral Daily    polyethylene glycol  17 g Oral Daily    potassium chloride  40 mEq Oral TID    pregabalin  50 mg Oral TID    senna-docusate 8.6-50 mg  1 tablet Oral Daily    sertraline  50 mg Oral Daily    vancomycin (VANCOCIN) IVPB  2,250 mg Intravenous Q12H     PRN Meds:acetaminophen, dextrose 10%, dextrose 10%, glucagon (human recombinant), glucose, glucose, glucose, HYDROmorphone, melatonin, oxyCODONE, Pharmacy to dose Vancomycin consult **AND** vancomycin - pharmacy to dose       Objective:     Weight: 131.5 kg (290 lb)  Body mass index is 45.42 kg/m².  Vital Signs (Most Recent):  Temp: 98.2 °F (36.8 °C) (02/19/22 0346)  Pulse: 89 (02/19/22 0346)  Resp: 18 (02/19/22 0346)  BP: 131/71 (02/19/22 0346)  SpO2: 99 % (02/19/22 0346) Vital Signs (24h Range):  Temp:  [96.4 °F (35.8 °C)-98.6 °F (37 °C)] 98.2 °F (36.8 °C)  Pulse:  [84-89] 89  Resp:  [13-20] 18  SpO2:  [94 %-99 %] 99 %  BP: (110-136)/(53-71) 131/71                     Female External Urinary Catheter 02/14/22 2300 (Active)   Output (mL) 650 mL 02/15/22 0645       Physical ExamGeneral: well developed, well nourished, no distress.   Head: normocephalic, atraumatic  Neck: No tracheal deviation.   Neurologic: Alert and oriented. Thought content appropriate.  GCS: Motor: 6/Verbal: 5/Eyes: 4 GCS Total: 15  Mental Status: Awake, Alert, Oriented x 4  Language: No aphasia   Speech: No dysarthria  Cranial nerves: face symmetric, tongue midline, CN II-XII grossly intact, EOMI.   Pulmonary: normal respirations, no signs of respiratory distress  Abdomen: soft, non-distended, not tender to palpation  Sensory: intact to light touch throughout  Motor  Strength: Moves all extremities spontaneously with good tone.  Pain limited weakness to proximal LLE. No abnormal movements seen.     Strength  Deltoids Triceps Biceps Wrist Extension Wrist Flexion Hand    Upper: R 5/5 5/5 5/5 5/5 5/5 5/5    L 5/5 5/5 5/5 5/5 5/5 5/5     Iliopsoas Quadriceps Knee  Flexion Tibialis  anterior Gastro- cnemius EHL   Lower: R 5/5 5/5 5/5 5/5 5/5 5/5    L 4+/5 4+/5 4+/5 5/5 5/5 5/5     Skin: Skin is warm, dry and intact.      Significant Labs:  Recent Labs   Lab 02/18/22  0251   GLU 82      K 4.3      CO2 24   BUN 9   CREATININE 0.5   CALCIUM 7.9*       Recent Labs   Lab 02/18/22  0251   WBC 2.25*   HGB 9.4*   HCT 29.9*   PLT 74*       Recent Labs   Lab 02/18/22 0251 02/19/22  0322   INR 1.3* 1.3*       Microbiology Results (last 7 days)       Procedure Component Value Units Date/Time    Blood Culture #1 **CANNOT BE ORDERED STAT** [[id number]] Collected: 02/14/22 1820    Order Status: Completed Specimen: Blood from Peripheral, Forearm, Right Updated: 02/18/22 2012     Blood Culture, Routine No Growth to date      No Growth to date      No Growth to date      No Growth to date      No Growth to date    Blood Culture #2 **CANNOT BE ORDERED STAT** [[id number]] Collected: 02/14/22 1820    Order Status: Completed Specimen: Blood from Peripheral, Antecubital, Right Updated: 02/18/22 2012     Blood Culture, Routine No Growth to date      No Growth to date      No Growth to date      No Growth to date      No Growth to date    Aerobic culture [[id number]] Collected: 02/15/22 1128    Order Status: Completed Specimen: Biopsy from Back Updated: 02/17/22 1409     Aerobic Bacterial Culture No growth    Narrative:      L3-4 / L4-5 osteodiscitis    Culture, Anaerobic [[id number]] Collected: 02/15/22 1128    Order Status: Completed Specimen: Biopsy from Back Updated: 02/17/22 0739     Anaerobic Culture Culture in progress    Narrative:      L3-4 / L4-5 osteodiscitis    AFB Culture & Smear  [[id number]] Collected: 02/15/22 1128    Order Status: Completed Specimen: Biopsy from Back Updated: 02/16/22 2127     AFB Culture & Smear Culture in progress     AFB CULTURE STAIN No acid fast bacilli seen.    Narrative:      L3-4 / L4-5 osteodiscitis    Gram stain [[id number]] Collected: 02/15/22 1128    Order Status: Completed Specimen: Biopsy from Back Updated: 02/15/22 1815     Gram Stain Result Rare WBC's      No organisms seen    Narrative:      L3-4 / L4-5 osteodiscitis    Fungus culture [[id number]] Collected: 02/15/22 1128    Order Status: Sent Specimen: Biopsy from Back Updated: 02/15/22 1414    Blood culture [[id number]] Collected: 02/14/22 2033    Order Status: Sent Specimen: Blood from Peripheral, Hand, Left Updated: 02/14/22 2034    Blood culture [[id number]] Collected: 02/14/22 2033    Order Status: Sent Specimen: Blood from Peripheral, Antecubital, Left Updated: 02/14/22 2034          Recent Lab Results         02/19/22  0322   02/18/22  1513        INR 1.3  Comment: Coumadin Therapy:  2.0 - 3.0 for INR for all indicators except mechanical heart valves  and antiphospholipid syndromes which should use 2.5 - 3.5.           Protime 13.5         Vancomycin-Trough   11.3             All pertinent labs from the last 24 hours have been reviewed.    Significant Diagnostics:  I have reviewed all pertinent imaging results/findings within the past 24 hours.  No results found in the last 24 hours.

## 2022-02-19 NOTE — NURSING
"Writer checked on pt and explained care for this evening and refused writer to complete assessment r/t pt is sleeping and doesn't want to be woke up with an assessment. Writer explained the reasoning behind an assessment and states "you got to be kidding me" and stated "your doing this to be a bitch". Writer left room. Will re-attempt at a later time.   "

## 2022-02-19 NOTE — PLAN OF CARE
Problem: Adult Inpatient Plan of Care  Goal: Plan of Care Review  Outcome: Ongoing, Progressing     Problem: Fall Injury Risk  Goal: Absence of Fall and Fall-Related Injury  Outcome: Ongoing, Progressing     Problem: Skin Injury Risk Increased  Goal: Skin Health and Integrity  Outcome: Ongoing, Progressing     Pt A&O x 4. Resting in bed during rounds. Education provided on meds given and care plan reviewed. Assessment completed. Call light within reach. Safety maintained.

## 2022-02-20 LAB
ABO + RH BLD: NORMAL
ALBUMIN SERPL BCP-MCNC: 2.2 G/DL (ref 3.5–5.2)
ALP SERPL-CCNC: 115 U/L (ref 55–135)
ALT SERPL W/O P-5'-P-CCNC: 14 U/L (ref 10–44)
ANION GAP SERPL CALC-SCNC: 5 MMOL/L (ref 8–16)
AST SERPL-CCNC: 41 U/L (ref 10–40)
BASOPHILS # BLD AUTO: 0.03 K/UL (ref 0–0.2)
BASOPHILS NFR BLD: 1 % (ref 0–1.9)
BILIRUB SERPL-MCNC: 1.4 MG/DL (ref 0.1–1)
BLD GP AB SCN CELLS X3 SERPL QL: NORMAL
BUN SERPL-MCNC: 10 MG/DL (ref 6–20)
CALCIUM SERPL-MCNC: 8.4 MG/DL (ref 8.7–10.5)
CHLORIDE SERPL-SCNC: 107 MMOL/L (ref 95–110)
CO2 SERPL-SCNC: 26 MMOL/L (ref 23–29)
CREAT SERPL-MCNC: 0.6 MG/DL (ref 0.5–1.4)
DIFFERENTIAL METHOD: ABNORMAL
EOSINOPHIL # BLD AUTO: 0.2 K/UL (ref 0–0.5)
EOSINOPHIL NFR BLD: 4.9 % (ref 0–8)
ERYTHROCYTE [DISTWIDTH] IN BLOOD BY AUTOMATED COUNT: 18 % (ref 11.5–14.5)
EST. GFR  (AFRICAN AMERICAN): >60 ML/MIN/1.73 M^2
EST. GFR  (NON AFRICAN AMERICAN): >60 ML/MIN/1.73 M^2
GLUCOSE SERPL-MCNC: 86 MG/DL (ref 70–110)
HCT VFR BLD AUTO: 33.6 % (ref 37–48.5)
HGB BLD-MCNC: 10.3 G/DL (ref 12–16)
IMM GRANULOCYTES # BLD AUTO: 0.03 K/UL (ref 0–0.04)
IMM GRANULOCYTES NFR BLD AUTO: 1 % (ref 0–0.5)
INR PPP: 1.3 (ref 0.8–1.2)
LYMPHOCYTES # BLD AUTO: 0.8 K/UL (ref 1–4.8)
LYMPHOCYTES NFR BLD: 24.4 % (ref 18–48)
MCH RBC QN AUTO: 27.8 PG (ref 27–31)
MCHC RBC AUTO-ENTMCNC: 30.7 G/DL (ref 32–36)
MCV RBC AUTO: 91 FL (ref 82–98)
MONOCYTES # BLD AUTO: 0.3 K/UL (ref 0.3–1)
MONOCYTES NFR BLD: 9.4 % (ref 4–15)
NEUTROPHILS # BLD AUTO: 1.8 K/UL (ref 1.8–7.7)
NEUTROPHILS NFR BLD: 59.3 % (ref 38–73)
NRBC BLD-RTO: 0 /100 WBC
PLATELET # BLD AUTO: 101 K/UL (ref 150–450)
PMV BLD AUTO: 8.8 FL (ref 9.2–12.9)
POTASSIUM SERPL-SCNC: 4.5 MMOL/L (ref 3.5–5.1)
PROT SERPL-MCNC: 7.8 G/DL (ref 6–8.4)
PROTHROMBIN TIME: 13.4 SEC (ref 9–12.5)
RBC # BLD AUTO: 3.7 M/UL (ref 4–5.4)
SARS-COV-2 RNA RESP QL NAA+PROBE: NOT DETECTED
SODIUM SERPL-SCNC: 138 MMOL/L (ref 136–145)
VANCOMYCIN TROUGH SERPL-MCNC: 21.4 UG/ML (ref 10–22)
WBC # BLD AUTO: 3.07 K/UL (ref 3.9–12.7)

## 2022-02-20 PROCEDURE — 86920 COMPATIBILITY TEST SPIN: CPT | Performed by: NURSE PRACTITIONER

## 2022-02-20 PROCEDURE — 99232 SBSQ HOSP IP/OBS MODERATE 35: CPT | Mod: ,,, | Performed by: NEUROLOGICAL SURGERY

## 2022-02-20 PROCEDURE — 11000001 HC ACUTE MED/SURG PRIVATE ROOM

## 2022-02-20 PROCEDURE — 36415 COLL VENOUS BLD VENIPUNCTURE: CPT | Performed by: NEUROLOGICAL SURGERY

## 2022-02-20 PROCEDURE — 36415 COLL VENOUS BLD VENIPUNCTURE: CPT | Performed by: STUDENT IN AN ORGANIZED HEALTH CARE EDUCATION/TRAINING PROGRAM

## 2022-02-20 PROCEDURE — 86920 COMPATIBILITY TEST SPIN: CPT | Performed by: STUDENT IN AN ORGANIZED HEALTH CARE EDUCATION/TRAINING PROGRAM

## 2022-02-20 PROCEDURE — 63600175 PHARM REV CODE 636 W HCPCS: Performed by: PHYSICIAN ASSISTANT

## 2022-02-20 PROCEDURE — U0003 INFECTIOUS AGENT DETECTION BY NUCLEIC ACID (DNA OR RNA); SEVERE ACUTE RESPIRATORY SYNDROME CORONAVIRUS 2 (SARS-COV-2) (CORONAVIRUS DISEASE [COVID-19]), AMPLIFIED PROBE TECHNIQUE, MAKING USE OF HIGH THROUGHPUT TECHNOLOGIES AS DESCRIBED BY CMS-2020-01-R: HCPCS | Performed by: STUDENT IN AN ORGANIZED HEALTH CARE EDUCATION/TRAINING PROGRAM

## 2022-02-20 PROCEDURE — 63600175 PHARM REV CODE 636 W HCPCS: Performed by: STUDENT IN AN ORGANIZED HEALTH CARE EDUCATION/TRAINING PROGRAM

## 2022-02-20 PROCEDURE — U0005 INFEC AGEN DETEC AMPLI PROBE: HCPCS | Performed by: STUDENT IN AN ORGANIZED HEALTH CARE EDUCATION/TRAINING PROGRAM

## 2022-02-20 PROCEDURE — 36415 COLL VENOUS BLD VENIPUNCTURE: CPT

## 2022-02-20 PROCEDURE — 25000003 PHARM REV CODE 250: Performed by: NEUROLOGICAL SURGERY

## 2022-02-20 PROCEDURE — 85025 COMPLETE CBC W/AUTO DIFF WBC: CPT | Performed by: STUDENT IN AN ORGANIZED HEALTH CARE EDUCATION/TRAINING PROGRAM

## 2022-02-20 PROCEDURE — 80202 ASSAY OF VANCOMYCIN: CPT | Performed by: NEUROLOGICAL SURGERY

## 2022-02-20 PROCEDURE — 85610 PROTHROMBIN TIME: CPT

## 2022-02-20 PROCEDURE — 25000003 PHARM REV CODE 250

## 2022-02-20 PROCEDURE — 99232 PR SUBSEQUENT HOSPITAL CARE,LEVL II: ICD-10-PCS | Mod: ,,, | Performed by: NEUROLOGICAL SURGERY

## 2022-02-20 PROCEDURE — 86850 RBC ANTIBODY SCREEN: CPT | Performed by: STUDENT IN AN ORGANIZED HEALTH CARE EDUCATION/TRAINING PROGRAM

## 2022-02-20 PROCEDURE — 25000003 PHARM REV CODE 250: Performed by: STUDENT IN AN ORGANIZED HEALTH CARE EDUCATION/TRAINING PROGRAM

## 2022-02-20 PROCEDURE — 80053 COMPREHEN METABOLIC PANEL: CPT

## 2022-02-20 PROCEDURE — 63600175 PHARM REV CODE 636 W HCPCS: Performed by: NEUROLOGICAL SURGERY

## 2022-02-20 RX ORDER — HYDROCODONE BITARTRATE AND ACETAMINOPHEN 500; 5 MG/1; MG/1
TABLET ORAL
Status: DISCONTINUED | OUTPATIENT
Start: 2022-02-20 | End: 2022-02-22

## 2022-02-20 RX ORDER — SODIUM CHLORIDE 9 MG/ML
INJECTION, SOLUTION INTRAVENOUS CONTINUOUS
Status: DISCONTINUED | OUTPATIENT
Start: 2022-02-21 | End: 2022-02-22

## 2022-02-20 RX ADMIN — PREGABALIN 50 MG: 50 CAPSULE ORAL at 08:02

## 2022-02-20 RX ADMIN — PANTOPRAZOLE SODIUM 40 MG: 20 TABLET, DELAYED RELEASE ORAL at 08:02

## 2022-02-20 RX ADMIN — HEPARIN SODIUM 7500 UNITS: 5000 INJECTION INTRAVENOUS; SUBCUTANEOUS at 01:02

## 2022-02-20 RX ADMIN — OXYCODONE 5 MG: 5 TABLET ORAL at 01:02

## 2022-02-20 RX ADMIN — HEPARIN SODIUM 7500 UNITS: 5000 INJECTION INTRAVENOUS; SUBCUTANEOUS at 05:02

## 2022-02-20 RX ADMIN — VANCOMYCIN HYDROCHLORIDE 2250 MG: 1.25 INJECTION, POWDER, LYOPHILIZED, FOR SOLUTION INTRAVENOUS at 05:02

## 2022-02-20 RX ADMIN — SERTRALINE HYDROCHLORIDE 50 MG: 50 TABLET ORAL at 08:02

## 2022-02-20 RX ADMIN — POLYETHYLENE GLYCOL 3350 17 G: 17 POWDER, FOR SOLUTION ORAL at 08:02

## 2022-02-20 RX ADMIN — CEFTRIAXONE SODIUM 2 G: 2 INJECTION, SOLUTION INTRAVENOUS at 01:02

## 2022-02-20 RX ADMIN — OXYCODONE 5 MG: 5 TABLET ORAL at 05:02

## 2022-02-20 RX ADMIN — PREGABALIN 50 MG: 50 CAPSULE ORAL at 05:02

## 2022-02-20 RX ADMIN — PREGABALIN 50 MG: 50 CAPSULE ORAL at 09:02

## 2022-02-20 RX ADMIN — OXYCODONE 5 MG: 5 TABLET ORAL at 07:02

## 2022-02-20 RX ADMIN — SENNOSIDES AND DOCUSATE SODIUM 1 TABLET: 50; 8.6 TABLET ORAL at 08:02

## 2022-02-20 RX ADMIN — HEPARIN SODIUM 7500 UNITS: 5000 INJECTION INTRAVENOUS; SUBCUTANEOUS at 09:02

## 2022-02-20 NOTE — PLAN OF CARE
"Patient chart reviewed this morning. Plan for OR tomorrow with neurosurgery.    42 y/o F with PMH of cirrhosis, Hep C, pancytopenia, polysubstance abuse, and epidural abscess s/p L3-L4 laminectomy and L3-L5 TLIF (4/2021; blood and surgical cultures positive for group B strep) admitted from [facility] [place] clinic with progressive lumbar spondylodiscitis and hardware failure. IM6 consulted for preop risk stratification and medical optimization.        - RCRI 0   - f/u blood cultures and biopsy cultures, negative so far  - continue rocephin and vancomycin per ID recs  - daily CBC, CMP, INR   - will need outpatient hepatology referral      Please secure chat [facility] Medicine M ("Medicine Consult Only") or contact me directly for any additional questions or concerns.      Cecille Rhodes MD  PGY1  Hospital Medicine Consult Service    "

## 2022-02-20 NOTE — SUBJECTIVE & OBJECTIVE
Interval History: 2/20: NAEON. AFVSS. Exam stable. Pain controlled. OR tomorrow.     Medications:  Continuous Infusions:    Scheduled Meds:   cefTRIAXone (ROCEPHIN) IVPB  2 g Intravenous Q24H    heparin (porcine)  7,500 Units Subcutaneous Q8H    pantoprazole  40 mg Oral Daily    polyethylene glycol  17 g Oral Daily    pregabalin  50 mg Oral TID    senna-docusate 8.6-50 mg  1 tablet Oral Daily    sertraline  50 mg Oral Daily    vancomycin (VANCOCIN) IVPB  2,250 mg Intravenous Q12H     PRN Meds:acetaminophen, dextrose 10%, dextrose 10%, glucagon (human recombinant), glucose, glucose, glucose, HYDROmorphone, melatonin, oxyCODONE, Pharmacy to dose Vancomycin consult **AND** vancomycin - pharmacy to dose       Objective:     Weight: 131.5 kg (290 lb)  Body mass index is 45.42 kg/m².  Vital Signs (Most Recent):  Temp: 98.4 °F (36.9 °C) (02/20/22 0358)  Pulse: 85 (02/20/22 0358)  Resp: 18 (02/20/22 0515)  BP: 121/64 (02/20/22 0358)  SpO2: 96 % (02/20/22 0358) Vital Signs (24h Range):  Temp:  [97.8 °F (36.6 °C)-98.4 °F (36.9 °C)] 98.4 °F (36.9 °C)  Pulse:  [80-93] 85  Resp:  [18] 18  SpO2:  [95 %-98 %] 96 %  BP: (100-121)/(55-64) 121/64                     Female External Urinary Catheter 02/14/22 2300 (Active)   Output (mL) 650 mL 02/15/22 0645       Physical ExamGeneral: well developed, well nourished, no distress.   Head: normocephalic, atraumatic  Neck: No tracheal deviation.   Neurologic: Alert and oriented. Thought content appropriate.  GCS: Motor: 6/Verbal: 5/Eyes: 4 GCS Total: 15  Mental Status: Awake, Alert, Oriented x 4  Language: No aphasia   Speech: No dysarthria  Cranial nerves: face symmetric, tongue midline, CN II-XII grossly intact, EOMI.   Pulmonary: normal respirations, no signs of respiratory distress  Abdomen: soft, non-distended, not tender to palpation  Sensory: intact to light touch throughout  Motor Strength: Moves all extremities spontaneously with good tone.  Pain limited weakness to proximal LLE.  No abnormal movements seen.     Strength  Deltoids Triceps Biceps Wrist Extension Wrist Flexion Hand    Upper: R 5/5 5/5 5/5 5/5 5/5 5/5    L 5/5 5/5 5/5 5/5 5/5 5/5     Iliopsoas Quadriceps Knee  Flexion Tibialis  anterior Gastro- cnemius EHL   Lower: R 5/5 5/5 5/5 5/5 5/5 5/5    L 4+/5 4+/5 4+/5 5/5 5/5 5/5     Skin: Skin is warm, dry and intact.      Significant Labs:  Recent Labs   Lab 02/19/22  0322 02/20/22  0244   GLU 80 86    138   K 4.2 4.5    107   CO2 25 26   BUN 7 10   CREATININE 0.6 0.6   CALCIUM 8.4* 8.4*       Recent Labs   Lab 02/19/22  0322 02/20/22  0244   WBC 2.34* 3.07*   HGB 10.5* 10.3*   HCT 34.3* 33.6*   * 101*       Recent Labs   Lab 02/19/22 0322 02/20/22  0244   INR 1.3* 1.3*       Microbiology Results (last 7 days)       Procedure Component Value Units Date/Time    Blood Culture #2 **CANNOT BE ORDERED STAT** [[id number]] Collected: 02/14/22 1820    Order Status: Completed Specimen: Blood from Peripheral, Antecubital, Right Updated: 02/19/22 2012     Blood Culture, Routine No growth after 5 days.    Blood Culture #1 **CANNOT BE ORDERED STAT** [[id number]] Collected: 02/14/22 1820    Order Status: Completed Specimen: Blood from Peripheral, Forearm, Right Updated: 02/19/22 2012     Blood Culture, Routine No growth after 5 days.    Aerobic culture [[id number]] Collected: 02/15/22 1128    Order Status: Completed Specimen: Biopsy from Back Updated: 02/19/22 1030     Aerobic Bacterial Culture No growth    Narrative:      L3-4 / L4-5 osteodiscitis    Culture, Anaerobic [[id number]] Collected: 02/15/22 1128    Order Status: Completed Specimen: Biopsy from Back Updated: 02/17/22 0739     Anaerobic Culture Culture in progress    Narrative:      L3-4 / L4-5 osteodiscitis    AFB Culture & Smear [[id number]] Collected: 02/15/22 1128    Order Status: Completed Specimen: Biopsy from Back Updated: 02/16/22 2127     AFB Culture & Smear Culture in progress     AFB CULTURE STAIN No acid  fast bacilli seen.    Narrative:      L3-4 / L4-5 osteodiscitis    Gram stain [[id number]] Collected: 02/15/22 1128    Order Status: Completed Specimen: Biopsy from Back Updated: 02/15/22 1815     Gram Stain Result Rare WBC's      No organisms seen    Narrative:      L3-4 / L4-5 osteodiscitis    Fungus culture [[id number]] Collected: 02/15/22 1128    Order Status: Sent Specimen: Biopsy from Back Updated: 02/15/22 1414    Blood culture [[id number]] Collected: 02/14/22 2033    Order Status: Sent Specimen: Blood from Peripheral, Hand, Left Updated: 02/14/22 2034    Blood culture [[id number]] Collected: 02/14/22 2033    Order Status: Sent Specimen: Blood from Peripheral, Antecubital, Left Updated: 02/14/22 2034          Recent Lab Results         02/20/22  0244        Albumin 2.2       Alkaline Phosphatase 115       ALT 14       Anion Gap 5       AST 41       Baso # 0.03       Basophil % 1.0       BILIRUBIN TOTAL 1.4  Comment: For infants and newborns, interpretation of results should be based  on gestational age, weight and in agreement with clinical  observations.    Premature Infant recommended reference ranges:  Up to 24 hours.............<8.0 mg/dL  Up to 48 hours............<12.0 mg/dL  3-5 days..................<15.0 mg/dL  6-29 days.................<15.0 mg/dL         BUN 10       Calcium 8.4       Chloride 107       CO2 26       Creatinine 0.6       Differential Method Automated       eGFR if  >60.0       eGFR if non  >60.0  Comment: Calculation used to obtain the estimated glomerular filtration  rate (eGFR) is the CKD-EPI equation.          Eos # 0.2       Eosinophil % 4.9       Glucose 86       Gran # (ANC) 1.8       Gran % 59.3       Hematocrit 33.6       Hemoglobin 10.3       Immature Grans (Abs) 0.03  Comment: Mild elevation in immature granulocytes is non specific and   can be seen in a variety of conditions including stress response,   acute inflammation, trauma and pregnancy.  Correlation with other   laboratory and clinical findings is essential.         Immature Granulocytes 1.0       INR 1.3  Comment: Coumadin Therapy:  2.0 - 3.0 for INR for all indicators except mechanical heart valves  and antiphospholipid syndromes which should use 2.5 - 3.5.         Lymph # 0.8       Lymph % 24.4       MCH 27.8       MCHC 30.7       MCV 91       Mono # 0.3       Mono % 9.4       MPV 8.8       nRBC 0       Platelets 101       Potassium 4.5       PROTEIN TOTAL 7.8       Protime 13.4       RBC 3.70       RDW 18.0       Sodium 138       WBC 3.07             All pertinent labs from the last 24 hours have been reviewed.    Significant Diagnostics:  I have reviewed all pertinent imaging results/findings within the past 24 hours.  No results found in the last 24 hours.      Review of Systems  Neurosurgery Physical Exam

## 2022-02-20 NOTE — PROGRESS NOTES
[facility] - Neurosurgery ([facility])  Neurosurgery  Progress Note    Subjective:     History of Present Illness: Rachel Zazueta is a 41 y.o.  female with PMHx of Hepatitis C, liver cirrhosis, pancytopenia, and polysubstance abuse, who presents to clinic for follow up with new imaging s/p L3 and L4 laminectomy for evacuation of epidural abscess and L3-L5 TLIF on 04/16/2021.     The pt c/o worsening back and left leg pain since October. She states that she is no longer using IV drugs. States only using marijuana. Describes the left leg pain as a shock down the leg when standing for 10 minutes. Denies taking any antibiotics. Endorses new weakness in the legs. Denies b/b dysfunction. Denies new neck pain. She ambulates with a walker at home. States that she smoke.    She presents to ED as direct admit from clinic.       Post-Op Info:  Procedure(s) (LRB):  AIRO FUSION, SPINE T10-PELVIS (N/A)         Interval History: 2/20: NAEON. AFVSS. Exam stable. Pain controlled. OR tomorrow.     Medications:  Continuous Infusions:    Scheduled Meds:   cefTRIAXone (ROCEPHIN) IVPB  2 g Intravenous Q24H    heparin (porcine)  7,500 Units Subcutaneous Q8H    pantoprazole  40 mg Oral Daily    polyethylene glycol  17 g Oral Daily    pregabalin  50 mg Oral TID    senna-docusate 8.6-50 mg  1 tablet Oral Daily    sertraline  50 mg Oral Daily    vancomycin (VANCOCIN) IVPB  2,250 mg Intravenous Q12H     PRN Meds:acetaminophen, dextrose 10%, dextrose 10%, glucagon (human recombinant), glucose, glucose, glucose, HYDROmorphone, melatonin, oxyCODONE, Pharmacy to dose Vancomycin consult **AND** vancomycin - pharmacy to dose       Objective:     Weight: 131.5 kg (290 lb)  Body mass index is 45.42 kg/m².  Vital Signs (Most Recent):  Temp: 98.4 °F (36.9 °C) (02/20/22 0358)  Pulse: 85 (02/20/22 0358)  Resp: 18 (02/20/22 0515)  BP: 121/64 (02/20/22 0358)  SpO2: 96 % (02/20/22 0358) Vital Signs (24h Range):  Temp:  [97.8 °F (36.6 °C)-98.4 °F  (36.9 °C)] 98.4 °F (36.9 °C)  Pulse:  [80-93] 85  Resp:  [18] 18  SpO2:  [95 %-98 %] 96 %  BP: (100-121)/(55-64) 121/64                     Female External Urinary Catheter 02/14/22 2300 (Active)   Output (mL) 650 mL 02/15/22 0645       Physical ExamGeneral: well developed, well nourished, no distress.   Head: normocephalic, atraumatic  Neck: No tracheal deviation.   Neurologic: Alert and oriented. Thought content appropriate.  GCS: Motor: 6/Verbal: 5/Eyes: 4 GCS Total: 15  Mental Status: Awake, Alert, Oriented x 4  Language: No aphasia   Speech: No dysarthria  Cranial nerves: face symmetric, tongue midline, CN II-XII grossly intact, EOMI.   Pulmonary: normal respirations, no signs of respiratory distress  Abdomen: soft, non-distended, not tender to palpation  Sensory: intact to light touch throughout  Motor Strength: Moves all extremities spontaneously with good tone.  Pain limited weakness to proximal LLE. No abnormal movements seen.     Strength  Deltoids Triceps Biceps Wrist Extension Wrist Flexion Hand    Upper: R 5/5 5/5 5/5 5/5 5/5 5/5    L 5/5 5/5 5/5 5/5 5/5 5/5     Iliopsoas Quadriceps Knee  Flexion Tibialis  anterior Gastro- cnemius EHL   Lower: R 5/5 5/5 5/5 5/5 5/5 5/5    L 4+/5 4+/5 4+/5 5/5 5/5 5/5     Skin: Skin is warm, dry and intact.      Significant Labs:  Recent Labs   Lab 02/19/22 0322 02/20/22  0244   GLU 80 86    138   K 4.2 4.5    107   CO2 25 26   BUN 7 10   CREATININE 0.6 0.6   CALCIUM 8.4* 8.4*       Recent Labs   Lab 02/19/22 0322 02/20/22  0244   WBC 2.34* 3.07*   HGB 10.5* 10.3*   HCT 34.3* 33.6*   * 101*       Recent Labs   Lab 02/19/22 0322 02/20/22  0244   INR 1.3* 1.3*       Microbiology Results (last 7 days)       Procedure Component Value Units Date/Time    Blood Culture #2 **CANNOT BE ORDERED STAT** [[id number]] Collected: 02/14/22 1820    Order Status: Completed Specimen: Blood from Peripheral, Antecubital, Right Updated: 02/19/22 2012     Blood  Culture, Routine No growth after 5 days.    Blood Culture #1 **CANNOT BE ORDERED STAT** [[id number]] Collected: 02/14/22 1820    Order Status: Completed Specimen: Blood from Peripheral, Forearm, Right Updated: 02/19/22 2012     Blood Culture, Routine No growth after 5 days.    Aerobic culture [[id number]] Collected: 02/15/22 1128    Order Status: Completed Specimen: Biopsy from Back Updated: 02/19/22 1030     Aerobic Bacterial Culture No growth    Narrative:      L3-4 / L4-5 osteodiscitis    Culture, Anaerobic [[id number]] Collected: 02/15/22 1128    Order Status: Completed Specimen: Biopsy from Back Updated: 02/17/22 0739     Anaerobic Culture Culture in progress    Narrative:      L3-4 / L4-5 osteodiscitis    AFB Culture & Smear [[id number]] Collected: 02/15/22 1128    Order Status: Completed Specimen: Biopsy from Back Updated: 02/16/22 2127     AFB Culture & Smear Culture in progress     AFB CULTURE STAIN No acid fast bacilli seen.    Narrative:      L3-4 / L4-5 osteodiscitis    Gram stain [[id number]] Collected: 02/15/22 1128    Order Status: Completed Specimen: Biopsy from Back Updated: 02/15/22 1815     Gram Stain Result Rare WBC's      No organisms seen    Narrative:      L3-4 / L4-5 osteodiscitis    Fungus culture [[id number]] Collected: 02/15/22 1128    Order Status: Sent Specimen: Biopsy from Back Updated: 02/15/22 1414    Blood culture [[id number]] Collected: 02/14/22 2033    Order Status: Sent Specimen: Blood from Peripheral, Hand, Left Updated: 02/14/22 2034    Blood culture [[id number]] Collected: 02/14/22 2033    Order Status: Sent Specimen: Blood from Peripheral, Antecubital, Left Updated: 02/14/22 2034          Recent Lab Results         02/20/22  0244        Albumin 2.2       Alkaline Phosphatase 115       ALT 14       Anion Gap 5       AST 41       Baso # 0.03       Basophil % 1.0       BILIRUBIN TOTAL 1.4  Comment: For infants and newborns, interpretation of results should be based  on gestational age,  weight and in agreement with clinical  observations.    Premature Infant recommended reference ranges:  Up to 24 hours.............<8.0 mg/dL  Up to 48 hours............<12.0 mg/dL  3-5 days..................<15.0 mg/dL  6-29 days.................<15.0 mg/dL         BUN 10       Calcium 8.4       Chloride 107       CO2 26       Creatinine 0.6       Differential Method Automated       eGFR if  >60.0       eGFR if non  >60.0  Comment: Calculation used to obtain the estimated glomerular filtration  rate (eGFR) is the CKD-EPI equation.          Eos # 0.2       Eosinophil % 4.9       Glucose 86       Gran # (ANC) 1.8       Gran % 59.3       Hematocrit 33.6       Hemoglobin 10.3       Immature Grans (Abs) 0.03  Comment: Mild elevation in immature granulocytes is non specific and   can be seen in a variety of conditions including stress response,   acute inflammation, trauma and pregnancy. Correlation with other   laboratory and clinical findings is essential.         Immature Granulocytes 1.0       INR 1.3  Comment: Coumadin Therapy:  2.0 - 3.0 for INR for all indicators except mechanical heart valves  and antiphospholipid syndromes which should use 2.5 - 3.5.         Lymph # 0.8       Lymph % 24.4       MCH 27.8       MCHC 30.7       MCV 91       Mono # 0.3       Mono % 9.4       MPV 8.8       nRBC 0       Platelets 101       Potassium 4.5       PROTEIN TOTAL 7.8       Protime 13.4       RBC 3.70       RDW 18.0       Sodium 138       WBC 3.07             All pertinent labs from the last 24 hours have been reviewed.    Significant Diagnostics:  I have reviewed all pertinent imaging results/findings within the past 24 hours.  No results found in the last 24 hours.      Review of Systems  Neurosurgery Physical Exam    Assessment/Plan:     Spondylodiscitis  Rachel Zazueta is a 41 F with PMH hepatitis C, cirrhosis, pancytopenia, nicotine abuse, hx IVDU (last use 2 years ago), strep  agalactae bacteremia, s/p L3-5 TLIF on 4/16/2021 who presents with persistent and progressive lumbar spondylodiscitis with resultant hardware failure and new scoliosis.     --Patient admitted to NPU on telemetry;       -q4h neurochecks on floor  --Plan for OR 2/21 for hardware removal, washout, extension of previous fusion.  --XR scoliosis today  --XR lumbar spine flex/ext completed.   --All labs and diagnostics reviewed  --Follow-up MRI Brain, C/T/L w/wo contrast   -No evidence of osteomyelitis, discitis, or epidural abscess within the cervical or thoracic spine   -No diffusion positive or abnormal enhancement within the brain   -MRI brain shows patchy nonspecific increased T2 and FLAIR signal in the periventricular and subcortical white matter bilaterally. Could be due to chronic microvascular ischemic changes vs demyelinating disease given patient's age. No evidence of active demyelination.   -DDD C4-7 with mild to moderate canal stenosis  --Full infectious w/u    -ESR 57, CRP 29, Procal 0.05, Lactate 1.1, BCx 2/14 NGTD, UA/Ucx pending   -F/u ID recs - started on empiric abx, continue.  --S/p L3-4 disc biopsy and L3 bone biopsy with IR 2/15 - Gram stain rare WBC. Cx NGTD.  -- consult for risk stratification and medical optimization. Appreciate assistance. RCRI 0, 3.9% risk of perioperative cardiac complication.   --Utox + for amphetamines, addiction psych consulted per .   --Hold anti-plt/coag medications  --Monitor for urinary retention - voiding spontaneous. Bladder scan prn.  --Continue current regimen for pain control   --Continue to monitor clinically, notify NSGY immediately with any changes in neuro status    Dispo: pending surgical intervention         Lee Patel MD  Neurosurgery  [facility] - Neurosurgery ([facility])

## 2022-02-20 NOTE — PLAN OF CARE
Problem: Adult Inpatient Plan of Care  Goal: Plan of Care Review  Outcome: Ongoing, Progressing  Goal: Optimal Comfort and Wellbeing  Outcome: Ongoing, Progressing     Problem: Fall Injury Risk  Goal: Absence of Fall and Fall-Related Injury  Outcome: Ongoing, Progressing     Problem: Skin Injury Risk Increased  Goal: Skin Health and Integrity  Outcome: Ongoing, Progressing   Patient is AAOx4 and makes her needs known. Call light in reach, refusing bed alarm. SR's up times 3 for safety purposes. VSS, flow sheets completed, refuses SCD's but has Heparin in place. Remains on IV Abt's for infection and is afebrile. Scheduled for a Wash out on Monday morning, will be NPO after midnight tonight for the procedure.

## 2022-02-21 ENCOUNTER — ANESTHESIA (OUTPATIENT)
Dept: SURGERY | Facility: HOSPITAL | Age: 42
DRG: 460 | End: 2022-02-21
Payer: MEDICAID

## 2022-02-21 LAB
ALBUMIN SERPL BCP-MCNC: 2.1 G/DL (ref 3.5–5.2)
ALBUMIN SERPL BCP-MCNC: 2.3 G/DL (ref 3.5–5.2)
ALP SERPL-CCNC: 117 U/L (ref 55–135)
ALP SERPL-CCNC: 92 U/L (ref 55–135)
ALT SERPL W/O P-5'-P-CCNC: 19 U/L (ref 10–44)
ALT SERPL W/O P-5'-P-CCNC: 21 U/L (ref 10–44)
ANION GAP SERPL CALC-SCNC: 4 MMOL/L (ref 8–16)
ANION GAP SERPL CALC-SCNC: 8 MMOL/L (ref 8–16)
ANISOCYTOSIS BLD QL SMEAR: SLIGHT
AST SERPL-CCNC: 50 U/L (ref 10–40)
AST SERPL-CCNC: 66 U/L (ref 10–40)
BASOPHILS # BLD AUTO: 0.03 K/UL (ref 0–0.2)
BASOPHILS # BLD AUTO: 0.03 K/UL (ref 0–0.2)
BASOPHILS NFR BLD: 0.4 % (ref 0–1.9)
BASOPHILS NFR BLD: 0.9 % (ref 0–1.9)
BILIRUB SERPL-MCNC: 1.5 MG/DL (ref 0.1–1)
BILIRUB SERPL-MCNC: 1.6 MG/DL (ref 0.1–1)
BLD PROD TYP BPU: NORMAL
BLOOD UNIT EXPIRATION DATE: NORMAL
BLOOD UNIT TYPE CODE: 6200
BLOOD UNIT TYPE CODE: 7300
BLOOD UNIT TYPE CODE: 8400
BLOOD UNIT TYPE: NORMAL
BUN SERPL-MCNC: 10 MG/DL (ref 6–20)
BUN SERPL-MCNC: 13 MG/DL (ref 6–20)
CALCIUM SERPL-MCNC: 7.7 MG/DL (ref 8.7–10.5)
CALCIUM SERPL-MCNC: 8.7 MG/DL (ref 8.7–10.5)
CHLORIDE SERPL-SCNC: 106 MMOL/L (ref 95–110)
CHLORIDE SERPL-SCNC: 107 MMOL/L (ref 95–110)
CO2 SERPL-SCNC: 22 MMOL/L (ref 23–29)
CO2 SERPL-SCNC: 27 MMOL/L (ref 23–29)
CODING SYSTEM: NORMAL
CREAT SERPL-MCNC: 0.6 MG/DL (ref 0.5–1.4)
CREAT SERPL-MCNC: 0.6 MG/DL (ref 0.5–1.4)
DIFFERENTIAL METHOD: ABNORMAL
DIFFERENTIAL METHOD: ABNORMAL
DISPENSE STATUS: NORMAL
EOSINOPHIL # BLD AUTO: 0 K/UL (ref 0–0.5)
EOSINOPHIL # BLD AUTO: 0.1 K/UL (ref 0–0.5)
EOSINOPHIL NFR BLD: 0.3 % (ref 0–8)
EOSINOPHIL NFR BLD: 4.2 % (ref 0–8)
ERYTHROCYTE [DISTWIDTH] IN BLOOD BY AUTOMATED COUNT: 17.9 % (ref 11.5–14.5)
ERYTHROCYTE [DISTWIDTH] IN BLOOD BY AUTOMATED COUNT: 18.1 % (ref 11.5–14.5)
EST. GFR  (AFRICAN AMERICAN): >60 ML/MIN/1.73 M^2
EST. GFR  (AFRICAN AMERICAN): >60 ML/MIN/1.73 M^2
EST. GFR  (NON AFRICAN AMERICAN): >60 ML/MIN/1.73 M^2
EST. GFR  (NON AFRICAN AMERICAN): >60 ML/MIN/1.73 M^2
GLUCOSE SERPL-MCNC: 148 MG/DL (ref 70–110)
GLUCOSE SERPL-MCNC: 91 MG/DL (ref 70–110)
GRAM STN SPEC: NORMAL
HCT VFR BLD AUTO: 28.8 % (ref 37–48.5)
HCT VFR BLD AUTO: 35.4 % (ref 37–48.5)
HGB BLD-MCNC: 11 G/DL (ref 12–16)
HGB BLD-MCNC: 9 G/DL (ref 12–16)
HYPOCHROMIA BLD QL SMEAR: ABNORMAL
IMM GRANULOCYTES # BLD AUTO: 0.02 K/UL (ref 0–0.04)
IMM GRANULOCYTES # BLD AUTO: 0.09 K/UL (ref 0–0.04)
IMM GRANULOCYTES NFR BLD AUTO: 0.6 % (ref 0–0.5)
IMM GRANULOCYTES NFR BLD AUTO: 1.2 % (ref 0–0.5)
INR PPP: 1.3 (ref 0.8–1.2)
LYMPHOCYTES # BLD AUTO: 0.7 K/UL (ref 1–4.8)
LYMPHOCYTES # BLD AUTO: 0.7 K/UL (ref 1–4.8)
LYMPHOCYTES NFR BLD: 22.4 % (ref 18–48)
LYMPHOCYTES NFR BLD: 9.1 % (ref 18–48)
MCH RBC QN AUTO: 27.6 PG (ref 27–31)
MCH RBC QN AUTO: 27.9 PG (ref 27–31)
MCHC RBC AUTO-ENTMCNC: 31.1 G/DL (ref 32–36)
MCHC RBC AUTO-ENTMCNC: 31.3 G/DL (ref 32–36)
MCV RBC AUTO: 89 FL (ref 82–98)
MCV RBC AUTO: 89 FL (ref 82–98)
MONOCYTES # BLD AUTO: 0.1 K/UL (ref 0.3–1)
MONOCYTES # BLD AUTO: 0.3 K/UL (ref 0.3–1)
MONOCYTES NFR BLD: 1.2 % (ref 4–15)
MONOCYTES NFR BLD: 8.8 % (ref 4–15)
NEUTROPHILS # BLD AUTO: 2.1 K/UL (ref 1.8–7.7)
NEUTROPHILS # BLD AUTO: 6.6 K/UL (ref 1.8–7.7)
NEUTROPHILS NFR BLD: 63.1 % (ref 38–73)
NEUTROPHILS NFR BLD: 87.8 % (ref 38–73)
NRBC BLD-RTO: 0 /100 WBC
NRBC BLD-RTO: 0 /100 WBC
OVALOCYTES BLD QL SMEAR: ABNORMAL
PLATELET # BLD AUTO: 120 K/UL (ref 150–450)
PLATELET # BLD AUTO: 229 K/UL (ref 150–450)
PLATELET BLD QL SMEAR: ABNORMAL
PMV BLD AUTO: 10 FL (ref 9.2–12.9)
PMV BLD AUTO: 9.2 FL (ref 9.2–12.9)
POIKILOCYTOSIS BLD QL SMEAR: SLIGHT
POLYCHROMASIA BLD QL SMEAR: ABNORMAL
POTASSIUM SERPL-SCNC: 4.1 MMOL/L (ref 3.5–5.1)
POTASSIUM SERPL-SCNC: 4.8 MMOL/L (ref 3.5–5.1)
PROT SERPL-MCNC: 6.9 G/DL (ref 6–8.4)
PROT SERPL-MCNC: 8 G/DL (ref 6–8.4)
PROTHROMBIN TIME: 13.3 SEC (ref 9–12.5)
RBC # BLD AUTO: 3.23 M/UL (ref 4–5.4)
RBC # BLD AUTO: 3.99 M/UL (ref 4–5.4)
SODIUM SERPL-SCNC: 137 MMOL/L (ref 136–145)
SODIUM SERPL-SCNC: 137 MMOL/L (ref 136–145)
TRANS ERYTHROCYTES VOL PATIENT: NORMAL ML
UNIT NUMBER: NORMAL
UNIT NUMBER: NORMAL
WBC # BLD AUTO: 3.31 K/UL (ref 3.9–12.7)
WBC # BLD AUTO: 7.49 K/UL (ref 3.9–12.7)

## 2022-02-21 PROCEDURE — 22852 REMOVE SPINE FIXATION DEVICE: CPT | Mod: ,,, | Performed by: NEUROLOGICAL SURGERY

## 2022-02-21 PROCEDURE — 63600175 PHARM REV CODE 636 W HCPCS: Performed by: STUDENT IN AN ORGANIZED HEALTH CARE EDUCATION/TRAINING PROGRAM

## 2022-02-21 PROCEDURE — 37000009 HC ANESTHESIA EA ADD 15 MINS: Performed by: NEUROLOGICAL SURGERY

## 2022-02-21 PROCEDURE — 25000003 PHARM REV CODE 250: Performed by: NURSE ANESTHETIST, CERTIFIED REGISTERED

## 2022-02-21 PROCEDURE — 88300 SURGICAL PATH GROSS: CPT | Performed by: STUDENT IN AN ORGANIZED HEALTH CARE EDUCATION/TRAINING PROGRAM

## 2022-02-21 PROCEDURE — 63600175 PHARM REV CODE 636 W HCPCS: Performed by: NEUROLOGICAL SURGERY

## 2022-02-21 PROCEDURE — 22852 PR REMOVE SPINE FIX DEV,POST SGMTAL: ICD-10-PCS | Mod: ,,, | Performed by: NEUROLOGICAL SURGERY

## 2022-02-21 PROCEDURE — 25000003 PHARM REV CODE 250: Performed by: NEUROLOGICAL SURGERY

## 2022-02-21 PROCEDURE — 85610 PROTHROMBIN TIME: CPT

## 2022-02-21 PROCEDURE — D9220A PRA ANESTHESIA: Mod: ,,, | Performed by: ANESTHESIOLOGY

## 2022-02-21 PROCEDURE — 27201037 HC PRESSURE MONITORING SET UP

## 2022-02-21 PROCEDURE — 36000708 HC OR TIME LEV III 1ST 15 MIN: Performed by: NEUROLOGICAL SURGERY

## 2022-02-21 PROCEDURE — 85025 COMPLETE CBC W/AUTO DIFF WBC: CPT | Performed by: STUDENT IN AN ORGANIZED HEALTH CARE EDUCATION/TRAINING PROGRAM

## 2022-02-21 PROCEDURE — C1729 CATH, DRAINAGE: HCPCS | Performed by: NEUROLOGICAL SURGERY

## 2022-02-21 PROCEDURE — 87116 MYCOBACTERIA CULTURE: CPT | Mod: 59 | Performed by: NEUROLOGICAL SURGERY

## 2022-02-21 PROCEDURE — 36000709 HC OR TIME LEV III EA ADD 15 MIN: Performed by: NEUROLOGICAL SURGERY

## 2022-02-21 PROCEDURE — P9035 PLATELET PHERES LEUKOREDUCED: HCPCS | Performed by: STUDENT IN AN ORGANIZED HEALTH CARE EDUCATION/TRAINING PROGRAM

## 2022-02-21 PROCEDURE — P9021 RED BLOOD CELLS UNIT: HCPCS | Performed by: NURSE PRACTITIONER

## 2022-02-21 PROCEDURE — 80053 COMPREHEN METABOLIC PANEL: CPT

## 2022-02-21 PROCEDURE — 36415 COLL VENOUS BLD VENIPUNCTURE: CPT

## 2022-02-21 PROCEDURE — 25000003 PHARM REV CODE 250: Performed by: STUDENT IN AN ORGANIZED HEALTH CARE EDUCATION/TRAINING PROGRAM

## 2022-02-21 PROCEDURE — 87102 FUNGUS ISOLATION CULTURE: CPT | Performed by: NEUROLOGICAL SURGERY

## 2022-02-21 PROCEDURE — 88300 SURGICAL PATH GROSS: CPT | Mod: 26,,, | Performed by: STUDENT IN AN ORGANIZED HEALTH CARE EDUCATION/TRAINING PROGRAM

## 2022-02-21 PROCEDURE — 87070 CULTURE OTHR SPECIMN AEROBIC: CPT | Mod: 59 | Performed by: NEUROLOGICAL SURGERY

## 2022-02-21 PROCEDURE — 80053 COMPREHEN METABOLIC PANEL: CPT | Mod: 91

## 2022-02-21 PROCEDURE — D9220A PRA ANESTHESIA: ICD-10-PCS | Mod: ,,, | Performed by: ANESTHESIOLOGY

## 2022-02-21 PROCEDURE — 87205 SMEAR GRAM STAIN: CPT | Performed by: NEUROLOGICAL SURGERY

## 2022-02-21 PROCEDURE — 36620 PR INSERT CATH,ART,PERCUT,SHORTTERM: ICD-10-PCS | Mod: 59,,, | Performed by: ANESTHESIOLOGY

## 2022-02-21 PROCEDURE — C1713 ANCHOR/SCREW BN/BN,TIS/BN: HCPCS | Performed by: NEUROLOGICAL SURGERY

## 2022-02-21 PROCEDURE — 20000000 HC ICU ROOM

## 2022-02-21 PROCEDURE — 37000008 HC ANESTHESIA 1ST 15 MINUTES: Performed by: NEUROLOGICAL SURGERY

## 2022-02-21 PROCEDURE — 87075 CULTR BACTERIA EXCEPT BLOOD: CPT | Performed by: NEUROLOGICAL SURGERY

## 2022-02-21 PROCEDURE — 36620 INSERTION CATHETER ARTERY: CPT | Mod: 59,,, | Performed by: ANESTHESIOLOGY

## 2022-02-21 PROCEDURE — 87206 SMEAR FLUORESCENT/ACID STAI: CPT | Mod: 91 | Performed by: NEUROLOGICAL SURGERY

## 2022-02-21 PROCEDURE — 27201423 OPTIME MED/SURG SUP & DEVICES STERILE SUPPLY: Performed by: NEUROLOGICAL SURGERY

## 2022-02-21 PROCEDURE — 88300 PR  SURG PATH,GROSS,LEVEL I: ICD-10-PCS | Mod: 26,,, | Performed by: STUDENT IN AN ORGANIZED HEALTH CARE EDUCATION/TRAINING PROGRAM

## 2022-02-21 PROCEDURE — 63600175 PHARM REV CODE 636 W HCPCS: Performed by: NURSE ANESTHETIST, CERTIFIED REGISTERED

## 2022-02-21 PROCEDURE — 63600175 PHARM REV CODE 636 W HCPCS

## 2022-02-21 PROCEDURE — 85025 COMPLETE CBC W/AUTO DIFF WBC: CPT | Mod: 91

## 2022-02-21 DEVICE — KIT GRAFT BONE/SUB STIM 20ML: Type: IMPLANTABLE DEVICE | Site: BACK | Status: FUNCTIONAL

## 2022-02-21 RX ORDER — ONDANSETRON 2 MG/ML
4 INJECTION INTRAMUSCULAR; INTRAVENOUS DAILY PRN
Status: CANCELLED | OUTPATIENT
Start: 2022-02-21

## 2022-02-21 RX ORDER — DEXAMETHASONE SODIUM PHOSPHATE 4 MG/ML
INJECTION, SOLUTION INTRA-ARTICULAR; INTRALESIONAL; INTRAMUSCULAR; INTRAVENOUS; SOFT TISSUE
Status: DISCONTINUED | OUTPATIENT
Start: 2022-02-21 | End: 2022-02-21

## 2022-02-21 RX ORDER — PROPOFOL 10 MG/ML
VIAL (ML) INTRAVENOUS CONTINUOUS PRN
Status: DISCONTINUED | OUTPATIENT
Start: 2022-02-21 | End: 2022-02-21

## 2022-02-21 RX ORDER — PHENYLEPHRINE HYDROCHLORIDE 10 MG/ML
INJECTION INTRAVENOUS
Status: DISCONTINUED | OUTPATIENT
Start: 2022-02-21 | End: 2022-02-21

## 2022-02-21 RX ORDER — DEXMEDETOMIDINE HYDROCHLORIDE 100 UG/ML
INJECTION, SOLUTION INTRAVENOUS
Status: DISCONTINUED | OUTPATIENT
Start: 2022-02-21 | End: 2022-02-21

## 2022-02-21 RX ORDER — HYDROMORPHONE HYDROCHLORIDE 1 MG/ML
1 INJECTION, SOLUTION INTRAMUSCULAR; INTRAVENOUS; SUBCUTANEOUS EVERY 4 HOURS PRN
Status: DISCONTINUED | OUTPATIENT
Start: 2022-02-21 | End: 2022-02-23

## 2022-02-21 RX ORDER — HYDROMORPHONE HYDROCHLORIDE 1 MG/ML
1 INJECTION, SOLUTION INTRAMUSCULAR; INTRAVENOUS; SUBCUTANEOUS ONCE
Status: COMPLETED | OUTPATIENT
Start: 2022-02-21 | End: 2022-02-21

## 2022-02-21 RX ORDER — FENTANYL CITRATE 50 UG/ML
INJECTION, SOLUTION INTRAMUSCULAR; INTRAVENOUS
Status: DISCONTINUED | OUTPATIENT
Start: 2022-02-21 | End: 2022-02-21

## 2022-02-21 RX ORDER — NOREPINEPHRINE BITARTRATE/D5W 4MG/250ML
0-3 PLASTIC BAG, INJECTION (ML) INTRAVENOUS CONTINUOUS
Status: DISCONTINUED | OUTPATIENT
Start: 2022-02-21 | End: 2022-02-22

## 2022-02-21 RX ORDER — TRANEXAMIC ACID 100 MG/ML
INJECTION, SOLUTION INTRAVENOUS
Status: DISCONTINUED | OUTPATIENT
Start: 2022-02-21 | End: 2022-02-21

## 2022-02-21 RX ORDER — VANCOMYCIN HYDROCHLORIDE 1 G/20ML
INJECTION, POWDER, LYOPHILIZED, FOR SOLUTION INTRAVENOUS
Status: DISCONTINUED | OUTPATIENT
Start: 2022-02-21 | End: 2022-02-21 | Stop reason: HOSPADM

## 2022-02-21 RX ORDER — ROCURONIUM BROMIDE 10 MG/ML
INJECTION, SOLUTION INTRAVENOUS
Status: DISCONTINUED | OUTPATIENT
Start: 2022-02-21 | End: 2022-02-21

## 2022-02-21 RX ORDER — ONDANSETRON 2 MG/ML
INJECTION INTRAMUSCULAR; INTRAVENOUS
Status: DISCONTINUED | OUTPATIENT
Start: 2022-02-21 | End: 2022-02-21

## 2022-02-21 RX ORDER — CEFAZOLIN SODIUM 1 G/3ML
INJECTION, POWDER, FOR SOLUTION INTRAMUSCULAR; INTRAVENOUS
Status: DISCONTINUED | OUTPATIENT
Start: 2022-02-21 | End: 2022-02-21

## 2022-02-21 RX ORDER — HYDROCODONE BITARTRATE AND ACETAMINOPHEN 500; 5 MG/1; MG/1
TABLET ORAL
Status: DISCONTINUED | OUTPATIENT
Start: 2022-02-21 | End: 2022-02-22

## 2022-02-21 RX ORDER — PROPOFOL 10 MG/ML
VIAL (ML) INTRAVENOUS
Status: DISCONTINUED | OUTPATIENT
Start: 2022-02-21 | End: 2022-02-21

## 2022-02-21 RX ORDER — OXYCODONE HYDROCHLORIDE 10 MG/1
10 TABLET ORAL EVERY 4 HOURS PRN
Status: DISCONTINUED | OUTPATIENT
Start: 2022-02-21 | End: 2022-03-15 | Stop reason: HOSPADM

## 2022-02-21 RX ORDER — LIDOCAINE HCL/PF 100 MG/5ML
SYRINGE (ML) INTRAVENOUS
Status: DISCONTINUED | OUTPATIENT
Start: 2022-02-21 | End: 2022-02-21

## 2022-02-21 RX ORDER — FENTANYL CITRATE 50 UG/ML
25 INJECTION, SOLUTION INTRAMUSCULAR; INTRAVENOUS EVERY 5 MIN PRN
Status: CANCELLED | OUTPATIENT
Start: 2022-02-21

## 2022-02-21 RX ORDER — MIDAZOLAM HYDROCHLORIDE 1 MG/ML
INJECTION INTRAMUSCULAR; INTRAVENOUS
Status: DISCONTINUED | OUTPATIENT
Start: 2022-02-21 | End: 2022-02-21

## 2022-02-21 RX ORDER — SUCCINYLCHOLINE CHLORIDE 20 MG/ML
INJECTION INTRAMUSCULAR; INTRAVENOUS
Status: DISCONTINUED | OUTPATIENT
Start: 2022-02-21 | End: 2022-02-21

## 2022-02-21 RX ADMIN — DEXMEDETOMIDINE HYDROCHLORIDE 8 MCG: 100 INJECTION, SOLUTION, CONCENTRATE INTRAVENOUS at 04:02

## 2022-02-21 RX ADMIN — PHENYLEPHRINE HYDROCHLORIDE 200 MCG: 10 INJECTION INTRAVENOUS at 02:02

## 2022-02-21 RX ADMIN — HYDROMORPHONE HYDROCHLORIDE 1 MG: 1 INJECTION, SOLUTION INTRAMUSCULAR; INTRAVENOUS; SUBCUTANEOUS at 06:02

## 2022-02-21 RX ADMIN — DEXMEDETOMIDINE HYDROCHLORIDE 8 MCG: 100 INJECTION, SOLUTION, CONCENTRATE INTRAVENOUS at 05:02

## 2022-02-21 RX ADMIN — TRANEXAMIC ACID 1000 MG: 100 INJECTION, SOLUTION INTRAVENOUS at 02:02

## 2022-02-21 RX ADMIN — PHENYLEPHRINE HYDROCHLORIDE 300 MCG: 10 INJECTION INTRAVENOUS at 02:02

## 2022-02-21 RX ADMIN — PROPOFOL 150 MG: 10 INJECTION, EMULSION INTRAVENOUS at 01:02

## 2022-02-21 RX ADMIN — SODIUM CHLORIDE, SODIUM GLUCONATE, SODIUM ACETATE, POTASSIUM CHLORIDE, MAGNESIUM CHLORIDE, SODIUM PHOSPHATE, DIBASIC, AND POTASSIUM PHOSPHATE: .53; .5; .37; .037; .03; .012; .00082 INJECTION, SOLUTION INTRAVENOUS at 01:02

## 2022-02-21 RX ADMIN — SUCCINYLCHOLINE CHLORIDE 160 MG: 20 INJECTION, SOLUTION INTRAMUSCULAR; INTRAVENOUS at 01:02

## 2022-02-21 RX ADMIN — HYDROMORPHONE HYDROCHLORIDE 1 MG: 1 INJECTION, SOLUTION INTRAMUSCULAR; INTRAVENOUS; SUBCUTANEOUS at 09:02

## 2022-02-21 RX ADMIN — PROPOFOL 150 MCG/KG/MIN: 10 INJECTION, EMULSION INTRAVENOUS at 01:02

## 2022-02-21 RX ADMIN — CEFAZOLIN 1 G: 330 INJECTION, POWDER, FOR SOLUTION INTRAMUSCULAR; INTRAVENOUS at 02:02

## 2022-02-21 RX ADMIN — NOREPINEPHRINE BITARTRATE 0.04 MCG/KG/MIN: 4 INJECTION, SOLUTION INTRAVENOUS at 06:02

## 2022-02-21 RX ADMIN — FENTANYL CITRATE 100 MCG: 50 INJECTION, SOLUTION INTRAMUSCULAR; INTRAVENOUS at 01:02

## 2022-02-21 RX ADMIN — SODIUM CHLORIDE, SODIUM GLUCONATE, SODIUM ACETATE, POTASSIUM CHLORIDE, MAGNESIUM CHLORIDE, SODIUM PHOSPHATE, DIBASIC, AND POTASSIUM PHOSPHATE: .53; .5; .37; .037; .03; .012; .00082 INJECTION, SOLUTION INTRAVENOUS at 05:02

## 2022-02-21 RX ADMIN — ACETAMINOPHEN 650 MG: 325 TABLET ORAL at 06:02

## 2022-02-21 RX ADMIN — SODIUM CHLORIDE: 0.9 INJECTION, SOLUTION INTRAVENOUS at 06:02

## 2022-02-21 RX ADMIN — SODIUM CHLORIDE, SODIUM GLUCONATE, SODIUM ACETATE, POTASSIUM CHLORIDE, MAGNESIUM CHLORIDE, SODIUM PHOSPHATE, DIBASIC, AND POTASSIUM PHOSPHATE: .53; .5; .37; .037; .03; .012; .00082 INJECTION, SOLUTION INTRAVENOUS at 03:02

## 2022-02-21 RX ADMIN — OXYCODONE HYDROCHLORIDE 10 MG: 10 TABLET ORAL at 06:02

## 2022-02-21 RX ADMIN — VANCOMYCIN HYDROCHLORIDE 2000 MG: 10 INJECTION, POWDER, LYOPHILIZED, FOR SOLUTION INTRAVENOUS at 10:02

## 2022-02-21 RX ADMIN — VANCOMYCIN HYDROCHLORIDE 2000 MG: 10 INJECTION, POWDER, LYOPHILIZED, FOR SOLUTION INTRAVENOUS at 09:02

## 2022-02-21 RX ADMIN — REMIFENTANIL HYDROCHLORIDE 0.2 MCG/KG/MIN: 1 INJECTION, POWDER, LYOPHILIZED, FOR SOLUTION INTRAVENOUS at 01:02

## 2022-02-21 RX ADMIN — MIDAZOLAM HYDROCHLORIDE 2 MG: 1 INJECTION, SOLUTION INTRAMUSCULAR; INTRAVENOUS at 01:02

## 2022-02-21 RX ADMIN — PREGABALIN 50 MG: 50 CAPSULE ORAL at 09:02

## 2022-02-21 RX ADMIN — ROCURONIUM BROMIDE 10 MG: 10 INJECTION, SOLUTION INTRAVENOUS at 01:02

## 2022-02-21 RX ADMIN — PREGABALIN 50 MG: 50 CAPSULE ORAL at 06:02

## 2022-02-21 RX ADMIN — CEFAZOLIN 2 G: 330 INJECTION, POWDER, FOR SOLUTION INTRAMUSCULAR; INTRAVENOUS at 01:02

## 2022-02-21 RX ADMIN — DEXAMETHASONE SODIUM PHOSPHATE 4 MG: 4 INJECTION, SOLUTION INTRAMUSCULAR; INTRAVENOUS at 01:02

## 2022-02-21 RX ADMIN — OXYCODONE HYDROCHLORIDE 10 MG: 10 TABLET ORAL at 11:02

## 2022-02-21 RX ADMIN — LIDOCAINE HYDROCHLORIDE 100 MG: 20 INJECTION, SOLUTION INTRAVENOUS at 01:02

## 2022-02-21 RX ADMIN — ONDANSETRON HYDROCHLORIDE 4 MG: 2 INJECTION INTRAMUSCULAR; INTRAVENOUS at 04:02

## 2022-02-21 RX ADMIN — SODIUM CHLORIDE 0.4 MCG/KG/MIN: 9 INJECTION, SOLUTION INTRAVENOUS at 02:02

## 2022-02-21 RX ADMIN — PHENYLEPHRINE HYDROCHLORIDE 200 MCG: 10 INJECTION INTRAVENOUS at 01:02

## 2022-02-21 NOTE — ASSESSMENT & PLAN NOTE
Rachel Zazueta is a 41 F with PMH hepatitis C, cirrhosis, pancytopenia, nicotine abuse, hx IVDU (last use 2 years ago), strep agalactae bacteremia, s/p L3-5 TLIF on 4/16/2021 who presents with persistent and progressive lumbar spondylodiscitis with resultant hardware failure and new scoliosis.     --Patient admitted to NPU on telemetry;       -q4h neurochecks on floor  --Plan for OR today for hardware removal, washout, extension of previous fusion.     --XR lumbar spine flex/ext completed.   --All labs and diagnostics reviewed  --Follow-up MRI Brain, C/T/L w/wo contrast   -No evidence of osteomyelitis, discitis, or epidural abscess within the cervical or thoracic spine   -No diffusion positive or abnormal enhancement within the brain   -MRI brain shows patchy nonspecific increased T2 and FLAIR signal in the periventricular and subcortical white matter bilaterally. Could be due to chronic microvascular ischemic changes vs demyelinating disease given patient's age. No evidence of active demyelination.   -DDD C4-7 with mild to moderate canal stenosis  --Full infectious w/u    -ESR 57, CRP 29, Procal 0.05, Lactate 1.1, BCx 2/14 NGTD, UA/Ucx pending   -F/u ID recs - started on empiric abx, continue.  --S/p L3-4 disc biopsy and L3 bone biopsy with IR 2/15 - Gram stain rare WBC. Cx NGTD.  -- consult for risk stratification and medical optimization. Appreciate assistance. RCRI 0, 3.9% risk of perioperative cardiac complication.   --Utox + for amphetamines, addiction psych consulted per .   --Hold anti-plt/coag medications  --Monitor for urinary retention - voiding spontaneous. Bladder scan prn.  --Continue current regimen for pain control   --Continue to monitor clinically, notify NSGY immediately with any changes in neuro status    Dispo: pending surgical intervention

## 2022-02-21 NOTE — SUBJECTIVE & OBJECTIVE
Interval History: 2/21: NAEON. AFVSS. Exam stable. Pain controlled. OR now      Medications:  Continuous Infusions:   sodium chloride 0.9% 125 mL/hr at 02/21/22 0600     Scheduled Meds:   cefTRIAXone (ROCEPHIN) IVPB  2 g Intravenous Q24H    pantoprazole  40 mg Oral Daily    polyethylene glycol  17 g Oral Daily    pregabalin  50 mg Oral TID    senna-docusate 8.6-50 mg  1 tablet Oral Daily    sertraline  50 mg Oral Daily    vancomycin (VANCOCIN) IVPB  15 mg/kg Intravenous Q12H     PRN Meds:sodium chloride, sodium chloride, sodium chloride, sodium chloride, sodium chloride, acetaminophen, dextrose 10%, dextrose 10%, glucagon (human recombinant), glucose, glucose, glucose, HYDROmorphone, melatonin, oxyCODONE, Pharmacy to dose Vancomycin consult **AND** vancomycin - pharmacy to dose       Objective:     Weight: 133.8 kg (295 lb)  Body mass index is 46.2 kg/m².  Vital Signs (Most Recent):  Temp: 98.2 °F (36.8 °C) (02/21/22 1132)  Pulse: 82 (02/21/22 1132)  Resp: 18 (02/21/22 1132)  BP: 106/71 (02/21/22 1132)  SpO2: 99 % (02/21/22 1132) Vital Signs (24h Range):  Temp:  [97.6 °F (36.4 °C)-98.6 °F (37 °C)] 98.2 °F (36.8 °C)  Pulse:  [78-99] 82  Resp:  [16-20] 18  SpO2:  [94 %-99 %] 99 %  BP: ()/(49-71) 106/71     Date 02/21/22 0700 - 02/22/22 0659   Shift 4604-5380 6306-7743 0264-7665 24 Hour Total   INTAKE   Blood 500   500   Shift Total(mL/kg) 500(3.7)   500(3.7)   OUTPUT   Shift Total(mL/kg)       Weight (kg) 133.8 133.8 133.8 133.8                   Female External Urinary Catheter 02/14/22 2300 (Active)   Output (mL) 650 mL 02/15/22 0645       Physical ExamGeneral: well developed, well nourished, no distress.   Head: normocephalic, atraumatic  Neck: No tracheal deviation.   Neurologic: Alert and oriented. Thought content appropriate.  GCS: Motor: 6/Verbal: 5/Eyes: 4 GCS Total: 15  Mental Status: Awake, Alert, Oriented x 4  Language: No aphasia   Speech: No dysarthria  Cranial nerves: face symmetric, tongue  midline, CN II-XII grossly intact, EOMI.   Pulmonary: normal respirations, no signs of respiratory distress  Abdomen: soft, non-distended, not tender to palpation  Sensory: intact to light touch throughout  Motor Strength: Moves all extremities spontaneously with good tone.  Pain limited weakness to proximal LLE. No abnormal movements seen.     Strength  Deltoids Triceps Biceps Wrist Extension Wrist Flexion Hand    Upper: R 5/5 5/5 5/5 5/5 5/5 5/5    L 5/5 5/5 5/5 5/5 5/5 5/5     Iliopsoas Quadriceps Knee  Flexion Tibialis  anterior Gastro- cnemius EHL   Lower: R 5/5 5/5 5/5 5/5 5/5 5/5    L 4+/5 4+/5 4+/5 5/5 5/5 5/5     Skin: Skin is warm, dry and intact.      Significant Labs:  Recent Labs   Lab 02/20/22  0244 02/21/22  0444   GLU 86 91    137   K 4.5 4.1    106   CO2 26 27   BUN 10 10   CREATININE 0.6 0.6   CALCIUM 8.4* 8.7       Recent Labs   Lab 02/20/22  0244 02/21/22  0444   WBC 3.07* 3.31*   HGB 10.3* 11.0*   HCT 33.6* 35.4*   * 120*       Recent Labs   Lab 02/20/22  0244 02/21/22  0444   INR 1.3* 1.3*       Microbiology Results (last 7 days)       Procedure Component Value Units Date/Time    Fungus culture [[id number]] Collected: 02/15/22 1128    Order Status: Completed Specimen: Biopsy from Back Updated: 02/21/22 1150     Fungus (Mycology) Culture Culture in progress    Narrative:      L3-4 / L4-5 osteodiscitis    Culture, Anaerobic [[id number]] Collected: 02/15/22 1128    Order Status: Completed Specimen: Biopsy from Back Updated: 02/21/22 0859     Anaerobic Culture Culture in progress    Narrative:      L3-4 / L4-5 osteodiscitis    Blood Culture #2 **CANNOT BE ORDERED STAT** [[id number]] Collected: 02/14/22 1820    Order Status: Completed Specimen: Blood from Peripheral, Antecubital, Right Updated: 02/19/22 2012     Blood Culture, Routine No growth after 5 days.    Blood Culture #1 **CANNOT BE ORDERED STAT** [[id number]] Collected: 02/14/22 1820    Order Status: Completed Specimen:  Blood from Peripheral, Forearm, Right Updated: 02/19/22 2012     Blood Culture, Routine No growth after 5 days.    Aerobic culture [[id number]] Collected: 02/15/22 1128    Order Status: Completed Specimen: Biopsy from Back Updated: 02/19/22 1030     Aerobic Bacterial Culture No growth    Narrative:      L3-4 / L4-5 osteodiscitis    AFB Culture & Smear [[id number]] Collected: 02/15/22 1128    Order Status: Completed Specimen: Biopsy from Back Updated: 02/16/22 2127     AFB Culture & Smear Culture in progress     AFB CULTURE STAIN No acid fast bacilli seen.    Narrative:      L3-4 / L4-5 osteodiscitis    Gram stain [[id number]] Collected: 02/15/22 1128    Order Status: Completed Specimen: Biopsy from Back Updated: 02/15/22 1815     Gram Stain Result Rare WBC's      No organisms seen    Narrative:      L3-4 / L4-5 osteodiscitis    Blood culture [[id number]] Collected: 02/14/22 2033    Order Status: Sent Specimen: Blood from Peripheral, Hand, Left Updated: 02/14/22 2034    Blood culture [[id number]] Collected: 02/14/22 2033    Order Status: Sent Specimen: Blood from Peripheral, Antecubital, Left Updated: 02/14/22 2034          Recent Lab Results         02/21/22  0444   02/20/22  1718   02/20/22  1632        Albumin 2.3           Alkaline Phosphatase 117           ALT 19           Anion Gap 4           AST 50           Baso # 0.03           Basophil % 0.9           BILIRUBIN TOTAL 1.6  Comment: For infants and newborns, interpretation of results should be based  on gestational age, weight and in agreement with clinical  observations.    Premature Infant recommended reference ranges:  Up to 24 hours.............<8.0 mg/dL  Up to 48 hours............<12.0 mg/dL  3-5 days..................<15.0 mg/dL  6-29 days.................<15.0 mg/dL             BUN 10           Calcium 8.7           Chloride 106           CO2 27           Creatinine 0.6           Differential Method Automated           eGFR if  >60.0            eGFR if non  >60.0  Comment: Calculation used to obtain the estimated glomerular filtration  rate (eGFR) is the CKD-EPI equation.              Eos # 0.1           Eosinophil % 4.2           Glucose 91           Gran # (ANC) 2.1           Gran % 63.1           Hematocrit 35.4           Hemoglobin 11.0           Immature Grans (Abs) 0.02  Comment: Mild elevation in immature granulocytes is non specific and   can be seen in a variety of conditions including stress response,   acute inflammation, trauma and pregnancy. Correlation with other   laboratory and clinical findings is essential.             Immature Granulocytes 0.6           INR 1.3  Comment: Coumadin Therapy:  2.0 - 3.0 for INR for all indicators except mechanical heart valves  and antiphospholipid syndromes which should use 2.5 - 3.5.             Lymph # 0.7           Lymph % 22.4           MCH 27.6           MCHC 31.1           MCV 89           Mono # 0.3           Mono % 8.8           MPV 9.2           nRBC 0           Platelets 120           Potassium 4.1           PROTEIN TOTAL 8.0           Protime 13.3           RBC 3.99           RDW 18.1           SARS-CoV2 (COVID-19) Qualitative PCR   Not Detected  Comment: This test utilizes a real-time reverse transcription  polymerase chain reaction procedure to amplify and   detect the SARS-CoV-2 and detect the SARS-CoV-2 N2 and E nucleic  acid targets. The analytical sensitivity (limit of detection) of   this assay is 250 copies/mL.    A Detected result implies that the patient is infected with the  SARS-CoV-2 virus and is presumed to be contagious.    A Not Detected result implies that the SARS-CoV-2 target nucleic  acids are not present above the limit of detection.  It does not  rule out the possibility of COVID-19 and should not be the sole  basis for treatment decisions. If COVID-19 is strongly suspected  based on clinical and epidemiological history, re-testing should  be  considered.    This test is only for use under Food and Drug   Administration s Emergency Use Authorization (EUA).   Commercial reagents are provided by [employer].  Performance characteristics of the EUA have been   independently verified by [facility]   Department of Pathology and Laboratory Medicine.             Sodium 137           Vancomycin-Trough     21.4       WBC 3.31                 All pertinent labs from the last 24 hours have been reviewed.    Significant Diagnostics:  I have reviewed all pertinent imaging results/findings within the past 24 hours.  No results found in the last 24 hours.      Review of Systems  Neurosurgery Physical Exam

## 2022-02-21 NOTE — ANESTHESIA PROCEDURE NOTES
Arterial    Diagnosis: spondylosis    Patient location during procedure: done in OR  Procedure start time: 2/21/2022 2:00 PM  Timeout: 2/21/2022 2:00 PM  Procedure end time: 2/21/2022 2:03 PM    Staffing  Authorizing Provider: Osmar Oh MD  Performing Provider: Amara Munoz MD    Anesthesiologist was present at the time of the procedure.    Preanesthetic Checklist  Completed: patient identified, IV checked, site marked, risks and benefits discussed, surgical consent, monitors and equipment checked, pre-op evaluation, timeout performed and anesthesia consent givenArterial  Skin Prep: chlorhexidine gluconate  Local Infiltration: none  Orientation: right  Location: radial    Catheter Size: 20 G  Catheter placement by Anatomical landmarks. Heme positive aspiration all ports. Insertion Attempts: 2  Assessment  Dressing: secured with tape and tegaderm  Patient: Tolerated well

## 2022-02-21 NOTE — PLAN OF CARE
"Patient chart reviewed this morning. Pt in OR today.    40 y/o F with PMH of cirrhosis, Hep C, pancytopenia, polysubstance abuse, and epidural abscess s/p L3-L4 laminectomy and L3-L5 TLIF (4/2021; blood and surgical cultures positive for group B strep) admitted from [facility] clinic with progressive lumbar spondylodiscitis and hardware failure. IM6 consulted for preop risk stratification and medical optimization.        - RCRI 0   - f/u blood cultures and biopsy cultures, negative so far  - f/u surgical cultures   - continue rocephin and vancomycin per ID recs  - daily CBC, CMP, INR   - will need outpatient hepatology referral        Please secure [facility] Medicine M ("Medicine Consult Only") or contact me directly for any additional questions or concerns.    Cecille Rhodes MD  PGY1  Hospital Medicine Consult Service    "

## 2022-02-21 NOTE — PROGRESS NOTES
Pharmacokinetic Assessment Follow Up: IV Vancomycin    Vancomycin Regimen Assessment & Plan:  - Vancomycin trough level (11-hour level) resulted at 21.4 mcg/mL, which is considered supratherapeutic (goal: 15-20 mcg/mL)  - Stable serum creatinine  - Decrease from vancomycin 2250 mg IV every 12 hours to 2000 mg IV every 12 hours  - Next vancomycin level scheduled prior to the fourth dose on 2/22 at 20:00 or sooner if change in renal function      Drug levels (last 3 results):  Recent Labs   Lab Result Units 02/18/22  1513 02/20/22  1632   Vancomycin-Trough ug/mL 11.3 21.4       Pharmacy will continue to follow and monitor vancomycin.    Please contact pharmacy at extension [phone] for questions regarding this assessment.    Thank you for the consult,   Kenna Marks       Patient brief summary:  Rachel Zazueta is a 41 y.o. female initiated on antimicrobial therapy with IV Vancomycin for treatment of bone/joint infection      Drug Allergies:   Review of patient's allergies indicates:   Allergen Reactions    Bee venom protein (honey bee) Anaphylaxis     Patient reports she is allergic to bee stings.    Wasp sting [allergen ext-venom-honey bee] Anaphylaxis    Adhesive Blisters    Strawberry Hives     Patient reports itching and hives    Iodine and iodide containing products Hives    Naproxen Other (See Comments)     Throat closing    Shellfish containing products     Nuts [tree nut] Rash       Actual Body Weight:   131.5 kg    Renal Function:   Estimated Creatinine Clearance: 174.5 mL/min (based on SCr of 0.6 mg/dL).     Dialysis Method (if applicable):  N/A    CBC (last 72 hours):  Recent Labs   Lab Result Units 02/18/22  0251 02/19/22  0322 02/20/22  0244   WBC K/uL 2.25* 2.34* 3.07*   Hemoglobin g/dL 9.4* 10.5* 10.3*   Hematocrit % 29.9* 34.3* 33.6*   Platelets K/uL 74* 100* 101*   Gran % % 62.7 63.2 59.3   Lymph % % 24.0 21.8 24.4   Mono % % 8.4 8.5 9.4   Eosinophil % % 3.6 4.7 4.9   Basophil % % 0.9 0.9 1.0    Differential Method  Automated Automated Automated       Metabolic Panel (last 72 hours):  Recent Labs   Lab Result Units 02/18/22  0251 02/19/22  0322 02/20/22  0244   Sodium mmol/L 137 140 138   Potassium mmol/L 4.3 4.2 4.5   Chloride mmol/L 107 107 107   CO2 mmol/L 24 25 26   Glucose mg/dL 82 80 86   BUN mg/dL 9 7 10   Creatinine mg/dL 0.5 0.6 0.6   Albumin g/dL 2.1* 2.2* 2.2*   Total Bilirubin mg/dL 1.2* 1.4* 1.4*   Alkaline Phosphatase U/L 109 115 115   AST U/L 51* 48* 41*   ALT U/L 15 16 14       Vancomycin Administrations:  vancomycin given in the last 96 hours                   vancomycin 2 g in dextrose 5 % 500 mL IVPB (mg) 2,000 mg New Bag 02/18/22 0403     2,000 mg New Bag 02/17/22 1649     2,000 mg New Bag  0423     2,000 mg New Bag 02/16/22 1600     2,000 mg New Bag  0515      Restarted 02/15/22 1755     2,000 mg New Bag  1559                Microbiologic Results:  Microbiology Results (last 7 days)     Procedure Component Value Units Date/Time    Blood Culture #2 **CANNOT BE ORDERED STAT** [[id number]] Collected: 02/14/22 1820    Order Status: Completed Specimen: Blood from Peripheral, Antecubital, Right Updated: 02/19/22 2012     Blood Culture, Routine No growth after 5 days.    Blood Culture #1 **CANNOT BE ORDERED STAT** [[id number]] Collected: 02/14/22 1820    Order Status: Completed Specimen: Blood from Peripheral, Forearm, Right Updated: 02/19/22 2012     Blood Culture, Routine No growth after 5 days.    Aerobic culture [[id number]] Collected: 02/15/22 1128    Order Status: Completed Specimen: Biopsy from Back Updated: 02/19/22 1030     Aerobic Bacterial Culture No growth    Narrative:      L3-4 / L4-5 osteodiscitis    Culture, Anaerobic [[id number]] Collected: 02/15/22 1128    Order Status: Completed Specimen: Biopsy from Back Updated: 02/17/22 0739     Anaerobic Culture Culture in progress    Narrative:      L3-4 / L4-5 osteodiscitis    AFB Culture & Smear [[id number]] Collected: 02/15/22 1128     Order Status: Completed Specimen: Biopsy from Back Updated: 02/16/22 2127     AFB Culture & Smear Culture in progress     AFB CULTURE STAIN No acid fast bacilli seen.    Narrative:      L3-4 / L4-5 osteodiscitis    Gram stain [[id number]] Collected: 02/15/22 1128    Order Status: Completed Specimen: Biopsy from Back Updated: 02/15/22 1815     Gram Stain Result Rare WBC's      No organisms seen    Narrative:      L3-4 / L4-5 osteodiscitis    Fungus culture [[id number]] Collected: 02/15/22 1128    Order Status: Sent Specimen: Biopsy from Back Updated: 02/15/22 1414    Blood culture [[id number]] Collected: 02/14/22 2033    Order Status: Sent Specimen: Blood from Peripheral, Hand, Left Updated: 02/14/22 2034    Blood culture [[id number]] Collected: 02/14/22 2033    Order Status: Sent Specimen: Blood from Peripheral, Antecubital, Left Updated: 02/14/22 2034

## 2022-02-21 NOTE — PROGRESS NOTES
[facility] ([facility])  Neurosurgery  Progress Note    Subjective:     History of Present Illness: Rachel Zazueta is a 41 y.o.  female with PMHx of Hepatitis C, liver cirrhosis, pancytopenia, and polysubstance abuse, who presents to clinic for follow up with new imaging s/p L3 and L4 laminectomy for evacuation of epidural abscess and L3-L5 TLIF on 04/16/2021.     The pt c/o worsening back and left leg pain since October. She states that she is no longer using IV drugs. States only using marijuana. Describes the left leg pain as a shock down the leg when standing for 10 minutes. Denies taking any antibiotics. Endorses new weakness in the legs. Denies b/b dysfunction. Denies new neck pain. She ambulates with a walker at home. States that she smoke.    She presents to ED as direct admit from clinic.       Post-Op Info:     Day of Surgery     Interval History: 2/21: NAEON. AFVSS. Exam stable. Pain controlled. OR now      Medications:  Continuous Infusions:   sodium chloride 0.9% 125 mL/hr at 02/21/22 0600     Scheduled Meds:   cefTRIAXone (ROCEPHIN) IVPB  2 g Intravenous Q24H    pantoprazole  40 mg Oral Daily    polyethylene glycol  17 g Oral Daily    pregabalin  50 mg Oral TID    senna-docusate 8.6-50 mg  1 tablet Oral Daily    sertraline  50 mg Oral Daily    vancomycin (VANCOCIN) IVPB  15 mg/kg Intravenous Q12H     PRN Meds:sodium chloride, sodium chloride, sodium chloride, sodium chloride, sodium chloride, acetaminophen, dextrose 10%, dextrose 10%, glucagon (human recombinant), glucose, glucose, glucose, HYDROmorphone, melatonin, oxyCODONE, Pharmacy to dose Vancomycin consult **AND** vancomycin - pharmacy to dose       Objective:     Weight: 133.8 kg (295 lb)  Body mass index is 46.2 kg/m².  Vital Signs (Most Recent):  Temp: 98.2 °F (36.8 °C) (02/21/22 1132)  Pulse: 82 (02/21/22 1132)  Resp: 18 (02/21/22 1132)  BP: 106/71 (02/21/22 1132)  SpO2: 99 % (02/21/22 1132) Vital Signs (24h Range):  Temp:   [97.6 °F (36.4 °C)-98.6 °F (37 °C)] 98.2 °F (36.8 °C)  Pulse:  [78-99] 82  Resp:  [16-20] 18  SpO2:  [94 %-99 %] 99 %  BP: ()/(49-71) 106/71     Date 02/21/22 0700 - 02/22/22 0659   Shift 9169-6119 0061-3242 6179-3457 24 Hour Total   INTAKE   Blood 500   500   Shift Total(mL/kg) 500(3.7)   500(3.7)   OUTPUT   Shift Total(mL/kg)       Weight (kg) 133.8 133.8 133.8 133.8                   Female External Urinary Catheter 02/14/22 2300 (Active)   Output (mL) 650 mL 02/15/22 0645       Physical ExamGeneral: well developed, well nourished, no distress.   Head: normocephalic, atraumatic  Neck: No tracheal deviation.   Neurologic: Alert and oriented. Thought content appropriate.  GCS: Motor: 6/Verbal: 5/Eyes: 4 GCS Total: 15  Mental Status: Awake, Alert, Oriented x 4  Language: No aphasia   Speech: No dysarthria  Cranial nerves: face symmetric, tongue midline, CN II-XII grossly intact, EOMI.   Pulmonary: normal respirations, no signs of respiratory distress  Abdomen: soft, non-distended, not tender to palpation  Sensory: intact to light touch throughout  Motor Strength: Moves all extremities spontaneously with good tone.  Pain limited weakness to proximal LLE. No abnormal movements seen.     Strength  Deltoids Triceps Biceps Wrist Extension Wrist Flexion Hand    Upper: R 5/5 5/5 5/5 5/5 5/5 5/5    L 5/5 5/5 5/5 5/5 5/5 5/5     Iliopsoas Quadriceps Knee  Flexion Tibialis  anterior Gastro- cnemius EHL   Lower: R 5/5 5/5 5/5 5/5 5/5 5/5    L 4+/5 4+/5 4+/5 5/5 5/5 5/5     Skin: Skin is warm, dry and intact.      Significant Labs:  Recent Labs   Lab 02/20/22  0244 02/21/22  0444   GLU 86 91    137   K 4.5 4.1    106   CO2 26 27   BUN 10 10   CREATININE 0.6 0.6   CALCIUM 8.4* 8.7       Recent Labs   Lab 02/20/22  0244 02/21/22  0444   WBC 3.07* 3.31*   HGB 10.3* 11.0*   HCT 33.6* 35.4*   * 120*       Recent Labs   Lab 02/20/22  0244 02/21/22  0444   INR 1.3* 1.3*       Microbiology Results (last 7  days)       Procedure Component Value Units Date/Time    Fungus culture [[id number]] Collected: 02/15/22 1128    Order Status: Completed Specimen: Biopsy from Back Updated: 02/21/22 1150     Fungus (Mycology) Culture Culture in progress    Narrative:      L3-4 / L4-5 osteodiscitis    Culture, Anaerobic [[id number]] Collected: 02/15/22 1128    Order Status: Completed Specimen: Biopsy from Back Updated: 02/21/22 0859     Anaerobic Culture Culture in progress    Narrative:      L3-4 / L4-5 osteodiscitis    Blood Culture #2 **CANNOT BE ORDERED STAT** [[id number]] Collected: 02/14/22 1820    Order Status: Completed Specimen: Blood from Peripheral, Antecubital, Right Updated: 02/19/22 2012     Blood Culture, Routine No growth after 5 days.    Blood Culture #1 **CANNOT BE ORDERED STAT** [[id number]] Collected: 02/14/22 1820    Order Status: Completed Specimen: Blood from Peripheral, Forearm, Right Updated: 02/19/22 2012     Blood Culture, Routine No growth after 5 days.    Aerobic culture [[id number]] Collected: 02/15/22 1128    Order Status: Completed Specimen: Biopsy from Back Updated: 02/19/22 1030     Aerobic Bacterial Culture No growth    Narrative:      L3-4 / L4-5 osteodiscitis    AFB Culture & Smear [[id number]] Collected: 02/15/22 1128    Order Status: Completed Specimen: Biopsy from Back Updated: 02/16/22 2127     AFB Culture & Smear Culture in progress     AFB CULTURE STAIN No acid fast bacilli seen.    Narrative:      L3-4 / L4-5 osteodiscitis    Gram stain [[id number]] Collected: 02/15/22 1128    Order Status: Completed Specimen: Biopsy from Back Updated: 02/15/22 1815     Gram Stain Result Rare WBC's      No organisms seen    Narrative:      L3-4 / L4-5 osteodiscitis    Blood culture [[id number]] Collected: 02/14/22 2033    Order Status: Sent Specimen: Blood from Peripheral, Hand, Left Updated: 02/14/22 2034    Blood culture [[id number]] Collected: 02/14/22 2033    Order Status: Sent Specimen: Blood from Peripheral,  Antecubital, Left Updated: 02/14/22 2034          Recent Lab Results         02/21/22  0444   02/20/22  1718   02/20/22  1632        Albumin 2.3           Alkaline Phosphatase 117           ALT 19           Anion Gap 4           AST 50           Baso # 0.03           Basophil % 0.9           BILIRUBIN TOTAL 1.6  Comment: For infants and newborns, interpretation of results should be based  on gestational age, weight and in agreement with clinical  observations.    Premature Infant recommended reference ranges:  Up to 24 hours.............<8.0 mg/dL  Up to 48 hours............<12.0 mg/dL  3-5 days..................<15.0 mg/dL  6-29 days.................<15.0 mg/dL             BUN 10           Calcium 8.7           Chloride 106           CO2 27           Creatinine 0.6           Differential Method Automated           eGFR if  >60.0           eGFR if non  >60.0  Comment: Calculation used to obtain the estimated glomerular filtration  rate (eGFR) is the CKD-EPI equation.              Eos # 0.1           Eosinophil % 4.2           Glucose 91           Gran # (ANC) 2.1           Gran % 63.1           Hematocrit 35.4           Hemoglobin 11.0           Immature Grans (Abs) 0.02  Comment: Mild elevation in immature granulocytes is non specific and   can be seen in a variety of conditions including stress response,   acute inflammation, trauma and pregnancy. Correlation with other   laboratory and clinical findings is essential.             Immature Granulocytes 0.6           INR 1.3  Comment: Coumadin Therapy:  2.0 - 3.0 for INR for all indicators except mechanical heart valves  and antiphospholipid syndromes which should use 2.5 - 3.5.             Lymph # 0.7           Lymph % 22.4           MCH 27.6           MCHC 31.1           MCV 89           Mono # 0.3           Mono % 8.8           MPV 9.2           nRBC 0           Platelets 120           Potassium 4.1           PROTEIN TOTAL 8.0            Protime 13.3           RBC 3.99           RDW 18.1           SARS-CoV2 (COVID-19) Qualitative PCR   Not Detected  Comment: This test utilizes a real-time reverse transcription  polymerase chain reaction procedure to amplify and   detect the SARS-CoV-2 and detect the SARS-CoV-2 N2 and E nucleic  acid targets. The analytical sensitivity (limit of detection) of   this assay is 250 copies/mL.    A Detected result implies that the patient is infected with the  SARS-CoV-2 virus and is presumed to be contagious.    A Not Detected result implies that the SARS-CoV-2 target nucleic  acids are not present above the limit of detection.  It does not  rule out the possibility of COVID-19 and should not be the sole  basis for treatment decisions. If COVID-19 is strongly suspected  based on clinical and epidemiological history, re-testing should  be considered.    This test is only for use under Food and Drug   Administration s Emergency Use Authorization (EUA).   Commercial reagents are provided by [employer].  Performance characteristics of the EUA have been   independently verified by [facility]   Department of Pathology and Laboratory Medicine.             Sodium 137           Vancomycin-Trough     21.4       WBC 3.31                 All pertinent labs from the last 24 hours have been reviewed.    Significant Diagnostics:  I have reviewed all pertinent imaging results/findings within the past 24 hours.  No results found in the last 24 hours.      Review of Systems  Neurosurgery Physical Exam    Assessment/Plan:     Spondylodiscitis  Rachel Zazueta is a 41 F with PMH hepatitis C, cirrhosis, pancytopenia, nicotine abuse, hx IVDU (last use 2 years ago), strep agalactae bacteremia, s/p L3-5 TLIF on 4/16/2021 who presents with persistent and progressive lumbar spondylodiscitis with resultant hardware failure and new scoliosis.     --Patient admitted to NPU on telemetry;       -q4h neurochecks on floor  --Plan for  OR today for hardware removal, washout, extension of previous fusion.     --XR lumbar spine flex/ext completed.   --All labs and diagnostics reviewed  --Follow-up MRI Brain, C/T/L w/wo contrast   -No evidence of osteomyelitis, discitis, or epidural abscess within the cervical or thoracic spine   -No diffusion positive or abnormal enhancement within the brain   -MRI brain shows patchy nonspecific increased T2 and FLAIR signal in the periventricular and subcortical white matter bilaterally. Could be due to chronic microvascular ischemic changes vs demyelinating disease given patient's age. No evidence of active demyelination.   -DDD C4-7 with mild to moderate canal stenosis  --Full infectious w/u    -ESR 57, CRP 29, Procal 0.05, Lactate 1.1, BCx 2/14 NGTD, UA/Ucx pending   -F/u ID recs - started on empiric abx, continue.  --S/p L3-4 disc biopsy and L3 bone biopsy with IR 2/15 - Gram stain rare WBC. Cx NGTD.  -- consult for risk stratification and medical optimization. Appreciate assistance. RCRI 0, 3.9% risk of perioperative cardiac complication.   --Utox + for amphetamines, addiction psych consulted per .   --Hold anti-plt/coag medications  --Monitor for urinary retention - voiding spontaneous. Bladder scan prn.  --Continue current regimen for pain control   --Continue to monitor clinically, notify NSGY immediately with any changes in neuro status    Dispo: pending surgical intervention         Jaylon Gomes MD  Neurosurgery  [facility] - Surgery (2nd Fl)

## 2022-02-21 NOTE — PLAN OF CARE
[facility] - Neurosurgery (Hospital)  Discharge Reassessment    Primary Care Provider: Dannielle Merino DO    Expected Discharge Date: 2/24/2022     Patient is not medically ready for discharge.  Patient to go to surgery and then will have a PT/OT eval for discharge plan.    Reassessment (most recent)     Discharge Reassessment - 02/21/22 1009        Discharge Reassessment    Assessment Type Discharge Planning Reassessment     Did the patient's condition or plan change since previous assessment? No     Discharge Plan discussed with: Patient     Communicated SHIRA with patient/caregiver Date not available/Unable to determine     Discharge Plan A Home with family     Discharge Plan B Rehab     DME Needed Upon Discharge  none     Discharge Barriers Identified None     Why the patient remains in the hospital Requires continued medical care        Post-Acute Status    Discharge Delays None known at this time

## 2022-02-21 NOTE — PROGRESS NOTES
Pt stable overnight, still has back pain.     -0420: Paged Neurosurgery about the 2 orders of platelets for 6 and 7 am, per Dr. Patel's orders the pt can receive platelet 2 hours before the planned surgery    No acute events

## 2022-02-21 NOTE — NURSING
[place] [facility] [place]    Type of stroke/diagnosis: spinal hardware revision    TPA start and end time (if applicable) n/a    Thrombectomy start and end time (if applicable) n/a    Current symptoms: confusion, moaning, hypotensive    Skin assessment done: Y  Wounds noted: 2 drains, wound vac    *If wounds noted, was Wound Care consulted? N    Miller Completed? N, hypotensive post op    Patient Belongings on Admit: none    NCC notified: [facility]

## 2022-02-21 NOTE — ANESTHESIA PROCEDURE NOTES
Intubation    Date/Time: 2/21/2022 1:53 PM  Performed by: Amara Munoz MD  Authorized by: Osmar Oh MD     Intubation:     Induction:  Intravenous    Intubated:  Postinduction    Mask Ventilation:  Easy mask    Attempts:  1    Attempted By:  Resident anesthesiologist    Method of Intubation:  Video laryngoscopy    Blade:  Su 3    Laryngeal View Grade: Grade I - full view of cords      Difficult Airway Encountered?: No      Complications:  None    Airway Device:  Oral endotracheal tube    Airway Device Size:  7.0    Style/Cuff Inflation:  Cuffed    Tube secured:  21    Secured at:  The lips    Placement Verified By:  Capnometry    Complicating Factors:  None    Findings Post-Intubation:  BS equal bilateral

## 2022-02-21 NOTE — PLAN OF CARE
Patient chart reviewed as pt in OR today.     40 y/o F with PMH of cirrhosis, Hep C, pancytopenia, polysubstance abuse, and epidural abscess s/p L3-L4 laminectomy and L3-L5 TLIF (4/2021; blood and surgical cultures positive for group B strep) admitted from NSGY clinic with progressive lumbar spondylodiscitis and hardware failure with extension of spine involvement. ID following for that.  On Empiric vanc and rocephin.  S/P IR bx/cx of L3-4 - Cx NGTD      Plan:   - Cont vanc and rocephin  - f/u blood cultures and biopsy cultures, negative so far  - f/u surgical cultures   - will follow

## 2022-02-21 NOTE — TRANSFER OF CARE
"Anesthesia Transfer of Care Note    Patient: Rachel Zazueta    Procedure(s) Performed: Procedure(s) (LRB):  REMOVAL, HARDWARE, SPINE (N/A)    Patient location: ICU    Anesthesia Type: general    Transport from OR: Transported from OR on 6-10 L/min O2 by face mask with adequate spontaneous ventilation. Continuous ECG monitoring in transport. Continuous SpO2 monitoring in transport. Continuos invasive BP monitoring in transport    Post pain: adequate analgesia    Post assessment: no apparent anesthetic complications and tolerated procedure well    Post vital signs: stable    Level of consciousness: awake and alert    Nausea/Vomiting: no nausea/vomiting    Complications: none    Transfer of care protocol was followed      Last vitals:   Visit Vitals  /71 (BP Location: Right arm, Patient Position: Lying)   Pulse 82   Temp 36.8 °C (98.2 °F) (Oral)   Resp 18   Ht 5' 7" (1.702 m)   Wt 133.8 kg (295 lb)   LMP  (LMP Unknown)   SpO2 99%   Breastfeeding No   BMI 46.20 kg/m²     "

## 2022-02-22 LAB
ALBUMIN SERPL BCP-MCNC: 2.2 G/DL (ref 3.5–5.2)
ALP SERPL-CCNC: 97 U/L (ref 55–135)
ALT SERPL W/O P-5'-P-CCNC: 19 U/L (ref 10–44)
ANION GAP SERPL CALC-SCNC: 6 MMOL/L (ref 8–16)
AST SERPL-CCNC: 48 U/L (ref 10–40)
BACTERIA SPEC ANAEROBE CULT: NORMAL
BASOPHILS # BLD AUTO: 0.02 K/UL (ref 0–0.2)
BASOPHILS NFR BLD: 0.2 % (ref 0–1.9)
BILIRUB SERPL-MCNC: 2.7 MG/DL (ref 0.1–1)
BUN SERPL-MCNC: 15 MG/DL (ref 6–20)
CALCIUM SERPL-MCNC: 8.3 MG/DL (ref 8.7–10.5)
CHLORIDE SERPL-SCNC: 104 MMOL/L (ref 95–110)
CO2 SERPL-SCNC: 22 MMOL/L (ref 23–29)
CREAT SERPL-MCNC: 0.5 MG/DL (ref 0.5–1.4)
DIFFERENTIAL METHOD: ABNORMAL
EOSINOPHIL # BLD AUTO: 0 K/UL (ref 0–0.5)
EOSINOPHIL NFR BLD: 0 % (ref 0–8)
ERYTHROCYTE [DISTWIDTH] IN BLOOD BY AUTOMATED COUNT: 17.2 % (ref 11.5–14.5)
EST. GFR  (AFRICAN AMERICAN): >60 ML/MIN/1.73 M^2
EST. GFR  (NON AFRICAN AMERICAN): >60 ML/MIN/1.73 M^2
GLUCOSE SERPL-MCNC: 110 MG/DL (ref 70–110)
GLUCOSE SERPL-MCNC: 112 MG/DL (ref 70–110)
HCO3 UR-SCNC: 28.6 MMOL/L (ref 24–28)
HCT VFR BLD AUTO: 29.3 % (ref 37–48.5)
HCT VFR BLD CALC: 30 %PCV (ref 36–54)
HGB BLD-MCNC: 9.3 G/DL (ref 12–16)
IMM GRANULOCYTES # BLD AUTO: 0.05 K/UL (ref 0–0.04)
IMM GRANULOCYTES NFR BLD AUTO: 0.5 % (ref 0–0.5)
INR PPP: 1.3 (ref 0.8–1.2)
LYMPHOCYTES # BLD AUTO: 0.6 K/UL (ref 1–4.8)
LYMPHOCYTES NFR BLD: 6.1 % (ref 18–48)
MCH RBC QN AUTO: 28.3 PG (ref 27–31)
MCHC RBC AUTO-ENTMCNC: 31.7 G/DL (ref 32–36)
MCV RBC AUTO: 89 FL (ref 82–98)
MONOCYTES # BLD AUTO: 0.4 K/UL (ref 0.3–1)
MONOCYTES NFR BLD: 4.2 % (ref 4–15)
NEUTROPHILS # BLD AUTO: 8.3 K/UL (ref 1.8–7.7)
NEUTROPHILS NFR BLD: 89 % (ref 38–73)
NRBC BLD-RTO: 0 /100 WBC
PCO2 BLDA: 45.6 MMHG (ref 35–45)
PH SMN: 7.41 [PH] (ref 7.35–7.45)
PLATELET # BLD AUTO: 165 K/UL (ref 150–450)
PMV BLD AUTO: 9.5 FL (ref 9.2–12.9)
PO2 BLDA: 528 MMHG (ref 80–100)
POC BE: 4 MMOL/L
POC IONIZED CALCIUM: 1.19 MMOL/L (ref 1.06–1.42)
POC SATURATED O2: 100 % (ref 95–100)
POC TCO2: 30 MMOL/L (ref 23–27)
POTASSIUM BLD-SCNC: 3.9 MMOL/L (ref 3.5–5.1)
POTASSIUM SERPL-SCNC: 4.5 MMOL/L (ref 3.5–5.1)
PROT SERPL-MCNC: 6.8 G/DL (ref 6–8.4)
PROTHROMBIN TIME: 13.7 SEC (ref 9–12.5)
RBC # BLD AUTO: 3.29 M/UL (ref 4–5.4)
SAMPLE: ABNORMAL
SODIUM BLD-SCNC: 141 MMOL/L (ref 136–145)
SODIUM SERPL-SCNC: 132 MMOL/L (ref 136–145)
SODIUM SERPL-SCNC: 132 MMOL/L (ref 136–145)
VANCOMYCIN TROUGH SERPL-MCNC: 16.4 UG/ML (ref 10–22)
WBC # BLD AUTO: 9.3 K/UL (ref 3.9–12.7)

## 2022-02-22 PROCEDURE — 25000003 PHARM REV CODE 250: Performed by: STUDENT IN AN ORGANIZED HEALTH CARE EDUCATION/TRAINING PROGRAM

## 2022-02-22 PROCEDURE — 84295 ASSAY OF SERUM SODIUM: CPT

## 2022-02-22 PROCEDURE — 36573 INSJ PICC RS&I 5 YR+: CPT

## 2022-02-22 PROCEDURE — 63600175 PHARM REV CODE 636 W HCPCS: Performed by: PHYSICIAN ASSISTANT

## 2022-02-22 PROCEDURE — 76937 US GUIDE VASCULAR ACCESS: CPT

## 2022-02-22 PROCEDURE — 85025 COMPLETE CBC W/AUTO DIFF WBC: CPT | Performed by: STUDENT IN AN ORGANIZED HEALTH CARE EDUCATION/TRAINING PROGRAM

## 2022-02-22 PROCEDURE — 85610 PROTHROMBIN TIME: CPT

## 2022-02-22 PROCEDURE — 36568 INSJ PICC <5 YR W/O IMAGING: CPT

## 2022-02-22 PROCEDURE — 25000003 PHARM REV CODE 250: Performed by: NEUROLOGICAL SURGERY

## 2022-02-22 PROCEDURE — 80053 COMPREHEN METABOLIC PANEL: CPT

## 2022-02-22 PROCEDURE — 99233 PR SUBSEQUENT HOSPITAL CARE,LEVL III: ICD-10-PCS | Mod: ,,, | Performed by: PSYCHIATRY & NEUROLOGY

## 2022-02-22 PROCEDURE — 63600175 PHARM REV CODE 636 W HCPCS

## 2022-02-22 PROCEDURE — A4216 STERILE WATER/SALINE, 10 ML: HCPCS | Performed by: NEUROLOGICAL SURGERY

## 2022-02-22 PROCEDURE — 92610 EVALUATE SWALLOWING FUNCTION: CPT

## 2022-02-22 PROCEDURE — 97165 OT EVAL LOW COMPLEX 30 MIN: CPT

## 2022-02-22 PROCEDURE — 25000003 PHARM REV CODE 250

## 2022-02-22 PROCEDURE — 11000001 HC ACUTE MED/SURG PRIVATE ROOM

## 2022-02-22 PROCEDURE — 63600175 PHARM REV CODE 636 W HCPCS: Performed by: STUDENT IN AN ORGANIZED HEALTH CARE EDUCATION/TRAINING PROGRAM

## 2022-02-22 PROCEDURE — 97530 THERAPEUTIC ACTIVITIES: CPT

## 2022-02-22 PROCEDURE — 99900035 HC TECH TIME PER 15 MIN (STAT)

## 2022-02-22 PROCEDURE — C1751 CATH, INF, PER/CENT/MIDLINE: HCPCS

## 2022-02-22 PROCEDURE — 97162 PT EVAL MOD COMPLEX 30 MIN: CPT

## 2022-02-22 PROCEDURE — 63600175 PHARM REV CODE 636 W HCPCS: Performed by: NEUROLOGICAL SURGERY

## 2022-02-22 PROCEDURE — 99233 SBSQ HOSP IP/OBS HIGH 50: CPT | Mod: ,,, | Performed by: PSYCHIATRY & NEUROLOGY

## 2022-02-22 PROCEDURE — 80202 ASSAY OF VANCOMYCIN: CPT | Performed by: NEUROLOGICAL SURGERY

## 2022-02-22 PROCEDURE — 97110 THERAPEUTIC EXERCISES: CPT

## 2022-02-22 RX ORDER — SODIUM CHLORIDE 0.9 % (FLUSH) 0.9 %
10 SYRINGE (ML) INJECTION EVERY 6 HOURS
Status: DISCONTINUED | OUTPATIENT
Start: 2022-02-22 | End: 2022-03-15 | Stop reason: HOSPADM

## 2022-02-22 RX ORDER — HEPARIN SODIUM 5000 [USP'U]/ML
5000 INJECTION, SOLUTION INTRAVENOUS; SUBCUTANEOUS EVERY 8 HOURS
Status: DISPENSED | OUTPATIENT
Start: 2022-02-22 | End: 2022-03-01

## 2022-02-22 RX ORDER — SODIUM CHLORIDE 0.9 % (FLUSH) 0.9 %
10 SYRINGE (ML) INJECTION
Status: DISCONTINUED | OUTPATIENT
Start: 2022-02-22 | End: 2022-03-15 | Stop reason: HOSPADM

## 2022-02-22 RX ADMIN — Medication 10 ML: at 12:02

## 2022-02-22 RX ADMIN — OXYCODONE HYDROCHLORIDE 10 MG: 10 TABLET ORAL at 09:02

## 2022-02-22 RX ADMIN — ACETAMINOPHEN 650 MG: 325 TABLET ORAL at 08:02

## 2022-02-22 RX ADMIN — PREGABALIN 50 MG: 50 CAPSULE ORAL at 09:02

## 2022-02-22 RX ADMIN — PREGABALIN 50 MG: 50 CAPSULE ORAL at 02:02

## 2022-02-22 RX ADMIN — HYDROMORPHONE HYDROCHLORIDE 1 MG: 1 INJECTION, SOLUTION INTRAMUSCULAR; INTRAVENOUS; SUBCUTANEOUS at 04:02

## 2022-02-22 RX ADMIN — HYDROMORPHONE HYDROCHLORIDE 1 MG: 1 INJECTION, SOLUTION INTRAMUSCULAR; INTRAVENOUS; SUBCUTANEOUS at 12:02

## 2022-02-22 RX ADMIN — Medication 10 ML: at 11:02

## 2022-02-22 RX ADMIN — VANCOMYCIN HYDROCHLORIDE 2000 MG: 10 INJECTION, POWDER, LYOPHILIZED, FOR SOLUTION INTRAVENOUS at 09:02

## 2022-02-22 RX ADMIN — SERTRALINE HYDROCHLORIDE 50 MG: 50 TABLET ORAL at 08:02

## 2022-02-22 RX ADMIN — ACETAMINOPHEN 650 MG: 325 TABLET ORAL at 06:02

## 2022-02-22 RX ADMIN — OXYCODONE HYDROCHLORIDE 10 MG: 10 TABLET ORAL at 10:02

## 2022-02-22 RX ADMIN — Medication 10 ML: at 06:02

## 2022-02-22 RX ADMIN — HYDROMORPHONE HYDROCHLORIDE 1 MG: 1 INJECTION, SOLUTION INTRAMUSCULAR; INTRAVENOUS; SUBCUTANEOUS at 11:02

## 2022-02-22 RX ADMIN — PANTOPRAZOLE SODIUM 40 MG: 20 TABLET, DELAYED RELEASE ORAL at 08:02

## 2022-02-22 RX ADMIN — PREGABALIN 50 MG: 50 CAPSULE ORAL at 08:02

## 2022-02-22 RX ADMIN — POLYETHYLENE GLYCOL 3350 17 G: 17 POWDER, FOR SOLUTION ORAL at 08:02

## 2022-02-22 RX ADMIN — CEFTRIAXONE SODIUM 2 G: 2 INJECTION, SOLUTION INTRAVENOUS at 01:02

## 2022-02-22 RX ADMIN — VANCOMYCIN HYDROCHLORIDE 2000 MG: 10 INJECTION, POWDER, LYOPHILIZED, FOR SOLUTION INTRAVENOUS at 08:02

## 2022-02-22 RX ADMIN — HEPARIN SODIUM 5000 UNITS: 5000 INJECTION INTRAVENOUS; SUBCUTANEOUS at 01:02

## 2022-02-22 RX ADMIN — SENNOSIDES AND DOCUSATE SODIUM 1 TABLET: 50; 8.6 TABLET ORAL at 08:02

## 2022-02-22 RX ADMIN — HEPARIN SODIUM 5000 UNITS: 5000 INJECTION INTRAVENOUS; SUBCUTANEOUS at 09:02

## 2022-02-22 NOTE — HPI
Ms. Zazueta is a 40 yo F PMH cirrhosis, Hep C, pancytopenia, polysubstance abuse, epidural abscess s/p L3-L4 laminectomy and L3-L5 TLIF (4/2021; blood and surgical cultures positive for group B strep) who is admitted to the neuro ICU after hardware removal and washout. Per chart review patient had worsening back and left leg pain since October. The left leg pain is a shocking pain if she stands for 10 minutes. Also reports new weakness in the legs. Denies bowel/bladder dsyfunction or neck pain. Uses a walker to ambulate at home. Endorses cigarette and marijuana use denies Iv drug use for 2 years. Patient was on vanc and ceftriaxone prior to the surgery. She required platelets prior to the procedure, PRBCs intraoperatively and estimated blood loss 1L. On arrival to the neuro ICU she was hypotensive requiring levo and in pain.    Patient was s/d on 2/22/22 to NSGY floor and she was doing well, team managing pain and per notes neuro exam with 4/5 strength LLE and rest normal. She went to OR today for T10-pelvis fusion and L5-S1 TLIF, no complications. Had a 500cc blood loss requiring transfusion and dural tare. Patient arrived to [facility] c/o pain, but moving all 4 extremities, HD stable. Two drains in place.  Admitting for closer monitoring.

## 2022-02-22 NOTE — CONSULTS
D/L PICC placed in R basilic vein, 39cm in length with 0cm exposed. Arm circumference 40cm. Lot#[id number]

## 2022-02-22 NOTE — ASSESSMENT & PLAN NOTE
Patient initially sent to the hospital from [facility] clinic for infected hardware.  Of note history of IVDU and TLIF of L3-L5 on 04/16/2021 for previous spinal abscess. This hospital admission MRI C/T/L spine showed suspected discitis/osteomyelitis at L3-4 and L4-5 with probable hardware infection. Afebrile, white count 3.31 at time of admission to the ICU.     Continue vancomycin and zosyn   Washout and hardware removal with neurosurgery 2/21  Estimated 1L blood loss intraop, required 1L PRBCs intraoperatively  Monitor drain output  ID following

## 2022-02-22 NOTE — BRIEF OP NOTE
[facility] - Neuro Critical Care  Brief Operative Note    SUMMARY     Surgery Date: 2/21/2022     Surgeon(s) and Role:     * Guille Templeton MD - Primary     * Jaylon Gomes MD - Resident - Assisting    Pre-op Diagnosis:  Discitis of lumbar region [M46.46]  Spondylodiscitis [M46.40]    Post-op Diagnosis:  Post-Op Diagnosis Codes:     * Discitis of lumbar region [M46.46]     * Spondylodiscitis [M46.40]    Procedure(s) (LRB):  REMOVAL, HARDWARE, SPINE (N/A)    Anesthesia: General    Operative Findings: some puss noted .     Estimated Blood Loss: 1400 mL     Specimens:   Specimen (24h ago, onward)             Start     Ordered    02/21/22 1548  Specimen to Pathology, Surgery Neurosurgery  Once        Comments: Pre-op Diagnosis: Discitis of lumbar region [M46.46]  Spondylodiscitis [M46.40]    Procedure(s):  AIRO FUSION, SPINE T10-PELVIS     Number of specimens: 1    Name of specimens: 1) Hardware - Gross   References:    Click here for ordering Quick Tip   Question Answer Comment   Procedure Type: Neurosurgery    Specimen Class: Known or suspected malignancy    Release to patient Immediate        02/21/22 1628                [id number]

## 2022-02-22 NOTE — PT/OT/SLP EVAL
Occupational Therapy  Co- Evaluation and Treatment w/ PT    Name: Rachel Zazueta  MRN: [id number]  Admitting Diagnosis:  Spondylodiscitis 1 Day Post-Op  Length of Stay: 8 days    Procedure(s):  REMOVAL, HARDWARE, SPINE     Recommendations:     Discharge Recommendations: rehabilitation facility  Discharge Equipment Recommendations:   (TBD)  Barriers to discharge:   (increased assistance needed)    Plan:     Patient to be seen 3 x/week to address the above listed problems via self-care/home management, therapeutic activities, therapeutic exercises, neuromuscular re-education  · Plan of Care Expires: 03/21/22  · Plan of Care Reviewed with: patient    Assessment:     Rachel Zazueta is a 41 y.o. female with a medical diagnosis of Spondylodiscitis.  She presents with the following performance deficits affecting function: weakness, impaired sensation, impaired self care skills, impaired functional mobilty, decreased coordination, decreased safety awareness, pain, decreased lower extremity function, decreased upper extremity function, gait instability, impaired balance, decreased ROM.  Pt would benefit from skilled OT services in order to maximize independence with ADLs and facilitate safe discharge. Pt's clinical condition meets full inpatient rehab criteria. Inpatient rehab will provide to total interdisciplinary treatment approach needed. Pt is at high risk of unplanned readmission due to fall risk. The lower level of care cannot provide total interdisciplinary approach needed. Because of patient's significant decline from PLOF, patient would benefit from Inpatient Rehab to maximize return to PLOF. Pt is an excellent candidate for inpatient rehabilitation, as he has a qualifying diagnosis, displays good activity tolerance, has experienced decline from PLOF, has good family support, and is motivated to participate in therapy. When pt was returning to supine, pt found to have blood around brittanie estrada -RN  "informed    Time spent evaluating pt, performing bed mobility, EOB activity, and STS; pt educated on spinal precautions and log rolling technique    Relevant hx and current limitations:   Spinal precautions -TLSO    Rehab Prognosis: Good; patient would benefit from acute skilled OT services to address these deficits and reach maximum level of function.       Subjective   Communicated with: RAUL Bowden prior to session.  Patient found supine with wound vac, hemovac, telemetry, pulse ox (continuous), gu catheter, blood pressure cuff, bed alarm upon OT entry to room.    Chief Complaint: Post-op Problem (Back surg in 2020, infection of hardware with failure, sent here for admission)    Patient/Family Comments/goals: no more pain and be able to walk    Pain/Comfort:  · Pain Rating 1: 8/10  · Location - Orientation 1: generalized  · Location 1: back  · Pain Addressed 1: Pre-medicate for activity, Reposition, Distraction    Patients cultural, spiritual, Baptism conflicts given the current situation: no    Occupational Profile:  Living Environment: lives w/ s.o. in mobile home w/ [employer]; wis w/ threshold  Prior Level of Function: Patient reports being Mod I with mobility & assistance with LB ADLs. (last few weeks)  Roles/Repsonsibilities:   Hand Dominance: right   Work: no.   Drive: no.   Managing Medicines/Managing Home: yes.   Equipment Used at Home:  rollator, walker, rolling, bedside commode (hurrycane)    Patient reports they will have assistance from s.o. upon discharge.      Objective:     Patient found with: wound vac, hemovac, telemetry, pulse ox (continuous), gu catheter, blood pressure cuff, bed alarm   General Precautions: fall   Orthopedic Precautions:spinal precautions   Braces: TLSO   Oxygen Device: Room Air  Vitals: /65   Pulse 107   Temp 98.6 °F (37 °C) (Oral)   Resp 18   Ht 5' 7" (1.702 m)   Wt 133.8 kg (294 lb 15.6 oz)   LMP  (LMP Unknown)   SpO2 96%   Breastfeeding No   BMI " 46.20 kg/m²     Cognitive and Psychosocial Function:   · AOx4 -- Person, Place, Time and Situation   · Follows Commands/attention:follows multi-step commands  · Communication:  clear/fluent  · Memory: No Deficits noted  · Safety awareness/insight to disability: impaired     Hearing: Intact    Vision:  Intact visual fields    Physical Exam:     Left UE Right UE   UE Edema N/A N/A   UE ROM AROM: WFL AROM: WFL   UE Strength WFL WFL    Strength WFL WFL   Sensation normal normal   Fine Motor Coordination:  Intact Intact   Gross Motor Coordination: Intact Intact     Occupational Performance:  Bed Mobility:       Rolling left: moderate assistance   Scooting: towards EOB with maximal assistance   Supine to Sit: maximal assistance and 2 persons   Sit to Supine: maximal assistance and 2 persons    Functional Mobility/Transfers:     Sit to Stand: minimum assistance using B hand-held assist   o Pt took ~3 small steps towards HOB w/ Mod A    Activities of Daily Living:     Not observed this date    AMPA 6 Click ADL:  AMPAC Total Score: 14    Treatment & Education:   Therapist provided facilitation and instruction of proper body mechanics, energy conservation, and fall prevention strategies during tasks listed above.   Pt educated on role of OT, POC and goals for therapy   Pt educated on importance of OOB activities with staff member assistance and sitting OOB majority of the day.    Updated communication board with level of assist required (Min A) & educated RN/patient that pt is appropriate for transfers and mobility with RN/PCT.     Additional staff present: PT for co-eval/tx due to patient's medical complexities requiring two skilled therapists in order to appropriately assess patient's functional deficits as well as ensure patient safety, accommodate for limited activity tolerance, and provide appropriate, skilled assistance to maximize functional potential during evaluation.    Patient left HOB elevated with  all lines intact, call button in reach, bed alarm on and RN notified    GOALS:   Multidisciplinary Problems     Occupational Therapy Goals        Problem: Occupational Therapy Goal    Goal Priority Disciplines Outcome Interventions   Occupational Therapy Goal     OT, PT/OT Ongoing, Progressing    Description: Goals to be met by: 3/8/2022     Patient will increase functional independence with ADLs by performing:    Feeding with [place].  UE Dressing with Stand-by Assistance.  LE Dressing with Minimal Assistance.  Grooming while standing at sink with Set-up Assistance.  Toileting from toilet with Stand-by Assistance for hygiene and clothing management.   Toilet transfer to toilet with Contact Guard Assistance.  Pt able to verbalize and adhere to all spinal precautions 100% of the time.  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

## 2022-02-22 NOTE — SUBJECTIVE & OBJECTIVE
Interval History:   No AEON.  Afebrile and WBC WNL.  S/P Hardware removal  Per op note, purulence seen.  On floor now - co back pain  IR and OR Cx NGTD  The patient denies any recent fever, chills, or sweats.      Review of Systems   Constitutional:  Negative for activity change, chills, diaphoresis and fever.   Respiratory:  Negative for cough, shortness of breath and wheezing.    Cardiovascular:  Negative for chest pain.   Gastrointestinal:  Negative for abdominal pain, constipation, diarrhea, nausea and vomiting.   Genitourinary:  Negative for dysuria, frequency and urgency.   Musculoskeletal:  Positive for back pain.   Skin:  Positive for wound.   Neurological:  Negative for dizziness.   Hematological:  Does not bruise/bleed easily.   Objective:     Vital Signs (Most Recent):  Temp: 98.2 °F (36.8 °C) (02/22/22 0705)  Pulse: 98 (02/22/22 0805)  Resp: 20 (02/22/22 1006)  BP: 129/71 (02/22/22 0805)  SpO2: 98 % (02/22/22 0805) Vital Signs (24h Range):  Temp:  [96.4 °F (35.8 °C)-98.8 °F (37.1 °C)] 98.2 °F (36.8 °C)  Pulse:  [77-99] 98  Resp:  [10-24] 20  SpO2:  [95 %-100 %] 98 %  BP: ()/(53-71) 129/71  Arterial Line BP: (108-140)/(59-88) 140/88     Weight: 133.8 kg (294 lb 15.6 oz)  Body mass index is 46.2 kg/m².    Estimated Creatinine Clearance: 211.5 mL/min (based on SCr of 0.5 mg/dL).    Physical Exam  Constitutional:       General: She is not in acute distress.     Appearance: She is well-developed. She is not diaphoretic.   HENT:      Head: Normocephalic and atraumatic.   Cardiovascular:      Rate and Rhythm: Normal rate and regular rhythm.      Heart sounds: Normal heart sounds. No murmur heard.    No friction rub. No gallop.   Pulmonary:      Effort: Pulmonary effort is normal. No respiratory distress.      Breath sounds: Normal breath sounds. No wheezing or rales.      Comments: Anterior exam as patient with back pain  Abdominal:      General: Bowel sounds are normal. There is no distension.       Palpations: Abdomen is soft. There is no mass.      Tenderness: There is no abdominal tenderness. There is no guarding or rebound.   Skin:     General: Skin is warm and dry.   Neurological:      Mental Status: She is alert and oriented to person, place, and time.   Psychiatric:         Behavior: Behavior normal.       Significant Labs: Blood Culture:   Recent Labs   Lab 02/14/22  1820   LABBLOO No growth after 5 days.  No growth after 5 days.     CBC:   Recent Labs   Lab 02/21/22  0444 02/21/22  1613 02/21/22 1817 02/22/22  0342   WBC 3.31*  --  7.49 9.30   HGB 11.0*  --  9.0* 9.3*   HCT 35.4* 30* 28.8* 29.3*   *  --  229 165     CMP:   Recent Labs   Lab 02/21/22  0444 02/21/22 1817 02/22/22  0342    137 132*   K 4.1 4.8 4.5    107 104   CO2 27 22* 22*   GLU 91 148* 110   BUN 10 13 15   CREATININE 0.6 0.6 0.5   CALCIUM 8.7 7.7* 8.3*   PROT 8.0 6.9 6.8   ALBUMIN 2.3* 2.1* 2.2*   BILITOT 1.6* 1.5* 2.7*   ALKPHOS 117 92 97   AST 50* 66* 48*   ALT 19 21 19   ANIONGAP 4* 8 6*   EGFRNONAA >60.0 >60.0 >60.0     Wound Culture:   Recent Labs   Lab 02/15/22  1128 02/21/22  1539   LABAERO No growth No growth     All pertinent labs within the past 24 hours have been reviewed.    Significant Imaging: I have reviewed all pertinent imaging results/findings within the past 24 hours.  X-Ray Chest 1 View S/P PICC Line by Nursing [[id number]] Resulted: 02/22/22 1403   Order Status: Completed Updated: 02/22/22 1405   Narrative:     EXAMINATION:   XR CHEST 1 VIEW S/P PICC LINE BY NURSING     CLINICAL HISTORY:   PICC PLACEMENT;     TECHNIQUE:   Frontal chest radiograph     COMPARISON:   Chest radiograph 05/02/2021     FINDINGS:   Monitoring leads overlie the chest.  Patient is rotated.     Right-sided PICC line tip appears to extend at least to the mid to distal SVC level, noting the distal aspect is somewhat obscured by overlying monitoring leads.  Cardiomediastinal silhouette is midline and within normal limits.   The lungs are well expanded and grossly clear without pneumothorax.  Otherwise grossly stable chest.    Impression:       As above.       Electronically signed by: Ammon Diane MD   Date: 02/22/2022   Time: 14:03   X-Ray Lumbar Spine Ap And Lateral [[id number]] Resulted: 02/22/22 1241   Order Status: Completed Updated: 02/22/22 1244   Narrative:     EXAMINATION:   XR LUMBAR SPINE AP AND LATERAL     CLINICAL HISTORY:   To be done standing while TLSO is on.;     FINDINGS:   There is a dextroconvex curvature of the lumbar spine.  There are disc implants at L3-L4 and L4-L5.  The pedicle screws and fixation rods a been removed.  There are antibiotic beads packed along the posterior elements.  There is severe flattening and destruction of L4.  There is DJD.    Impression:       Postsurgical changes as above.       Electronically signed by: Toi Beyer MD   Date: 02/22/2022   Time: 12:41     SURG FL Surgery Fluoro Usage [[id number]] Resulted: 02/21/22 1642   Order Status: Completed Updated: 02/21/22 1642   Narrative:     See OP Notes for results.     IMPRESSION: See OP Notes for results.             This procedure was auto-finalized by: [facility]   X-Ray Scoliosis Complete [[id number]] Resulted: 02/20/22 1036   Order Status: Completed Updated: 02/20/22 1039   Narrative:     EXAMINATION:   XR SCOLIOSIS COMPLETE     CLINICAL HISTORY:   pre-op planning;     TECHNIQUE:   AP and lateral views of the entire spine     COMPARISON:   02/15/2022     FINDINGS:   there is 12 degrees of convex right scoliosis as measured from the superior endplate of T2 to the inferior endplate of T6. There is 25 degrees of convex right scoliosis as measured from the superior endplate of L1 to the inferior endplate of L5.  Postsurgical changes are again seen of a posterior L3 through L5 fusion with interbody spacer placements.  The loosening described on prior imaging is not as well seen on this exam.  There appears to be further destruction  of the L4 vertebral body when compared to 02/15/2022 on the lateral view that is not as well seen on the frontal view.  The frontal view however shows destructive endplate changes at L3-L4 and L4-L5 appear unchanged from 02/15/2022.     The remainder of the vertebral body heights are maintained.  Degenerative changes, moderate, are seen at C3-C4, C4-C5 and C5-C6.  Soft tissues are unremarkable.    Impression:       Destructive endplate changes at L3-L4 and L4-L5 that are concerning for discitis osteomyelitis. On the lateral view, there appears to be further destruction of the L4 vertebral body when compared to 02/15/2022 that is not borne out on the AP view.  Cross-sectional imaging could be considered for further evaluation.       Electronically signed by: Hoa Barclay   Date: 02/20/2022   Time: 10:36   X-Ray Lumbar Complete Including Flex And Ext [[id number]] Resulted: 02/16/22 0752   Order Status: Completed Updated: 02/16/22 0754   Narrative:     EXAMINATION:   XR LUMBAR SPINE 5 VIEW WITH FLEX AND EXT     CLINICAL HISTORY:   evaluate spinal instability; L3-4, L4-5 TLIF with hardware failure in setting of spondylodiscitis;     TECHNIQUE:   Five views of the lumbar spine plus flexion extension views were performed.     COMPARISON:   Lumbar spine exam December 31, 2021, CT of lumbar spine February 14, 2022     FINDINGS:   Stable 35 degree or so dextro rotary scoliosis of lower thoracic and lumbar spine.  No acute fractures with preserved vertebral body heights.  In reconfirmed findings of bilateral posterior hardware instrumented fusion in of L3, L4, and L5 vertebral segments with inter body spacers.  As was better seen on earlier CT, there is paralleling lucency  at the bone metal interfaces of all pedicle screws of concern for hardware loosening.  No fragmented hardware.  The CT demonstrated increased destructive endplate changes at L3-L4 and new destructive endplate changes at L4-L5 concerning for  discitis-osteomyelitis reconfirmed on the standard images but better defined on CT.  Grade 1 anterolisthesis of L4 with respect to L3 and L5.  No definite instability based on flexion and extension views.  Flexion and extension views demonstrate no definite instability.    Impression:       As above.       Electronically signed by: Brandan Aguilar   Date: 02/16/2022   Time: 07:52   US Abdomen Limited with Doppler (xpd) [[id number]] Resulted: 02/15/22 1859   Order Status: Completed Updated: 02/15/22 1902   Narrative:     EXAMINATION:   US ABDOMEN LIMITED WITH DOPPLER (XPD)     CLINICAL HISTORY:   cirrhosis;     TECHNIQUE:   Complete abdominal ultrasound with doppler.  Color and spectral Doppler were performed.     COMPARISON:   CT abdomen pelvis 07/27/2021.     FINDINGS:   The visualized portion of the pancreas demonstrates 1.6 x 0.7 x 1.1 cm peripancreatic lymph node.     The liver is normal in size measuring 13 cm.  The liver demonstrates heterogeneous echotexture no focal hepatic lesions are seen.     The gallbladder is surgically absent.  The common duct is not dilated, measuring 3 mm.  No dilated intrahepatic radicles are seen.     The spleen is enlarged in size measuring 20.6 x 10.7 cm with a homogeneous echotexture.     The aorta tapers normally.     The kidneys are normal in size without focal abnormality or evidence of hydronephrosis.     There is no evidence of ascites.     The main portal vein, right portal vein, left portal vein, middle hepatic vein, right hepatic vein, left hepatic vein, SMV, and IVC are patent with proper directional flow.  The main hepatic artery is patent. The umbilical vein is patent.    Impression:       Hepatic cirrhosis and portal hypertension with satisfactory Doppler evaluation of the liver.     Enlarged peripancreatic lymph node.     Electronically signed by resident: Nuha Grubbs   Date: 02/15/2022   Time: 18:27     Electronically signed by: Yovany Beatty MD   Date: 02/15/2022    Time: 18:59   IR Biopsy Bone deep [[id number]] Resulted: 02/15/22 1322   Order Status: Completed Updated: 02/15/22 1325   Narrative:     EXAMINATION:   IR BIOPSY BONE/DISC DEEP     CLINICAL HISTORY:   L3-4 / L4-5 osteodiscitis;     41 y.o. female with medical history of Hepatitis C, liver cirrhosis, polysubstance abuse, L3 and L4 laminectomy - with admission concerns for back pain. MRI spine revealing discitis/osteomyelitis of the lumbar spine at L3-4 and L4-5. Hence plans for fluroscopy guided L3-4 intervertebral disc biopsy.     TECHNIQUE:   Informed consent was obtained.  Biplane fluoroscopy was used.  Conscious intravenous sedation was provided by an independent radiology nurse who used continuous physiologic monitoring, and the time of the sedation was 20 minutes.     Using usual sterile technique, lidocaine local anesthesia and biplane fluoroscopy guidance, a biopsy procedure was performed via right posterolateral, para pedicular approach.  SLIC games VT Bone Biopsy Device 13Ga was used to obtain samples from the L3-L4 intervertebral disc and L3 inferior endplate.     Samples were sent to the lab.  There were no complications.  The patient tolerated the procedure well and was transferred to the recovery room after the biopsy procedure.  Images and report were permanently recorded.  Fluoroscopy dose was 2634.2 uGycm2.  Reference air kerma was 228.3mGy.  Fluoroscopy time was 2.7 minutes     COMPARISON:   None    Impression:       Successful fluoroscopic guided biopsy of the L3-L4 intervertebral disc and L3 inferior endplate.     Electronically signed by resident: Tomy Frey   Date: 02/15/2022   Time: 12:48     Electronically signed by: Guille Gimenez MD   Date: 02/15/2022   Time: 13:22       Imaging History    2022  Date Procedure Name Status Accession Number Location   02/21/22 04:41 PM SURG FL Surgery Fluoro Usage Final [id number] [facility]   02/20/22 09:49 AM X-Ray Scoliosis Complete Final [id number] [facility]    02/15/22 05:58 PM US Abdomen Limited with Doppler (xpd) Final [id number] [facility]   02/15/22 05:21 PM X-Ray Lumbar Complete Including Flex And Ext Final [id number] [facility]   02/15/22 11:35 AM IR Biopsy Bone deep Final [id number] [facility]   02/14/22 10:43 PM MRI Spine Cervical-Thoracic-Lumbar W W/O Contrast (XPD) Final [id number] [facility]   02/14/22 10:42 PM MRI Brain W WO Contrast Final [id number] [facility]   02/14/22 01:11 PM CT Lumbar Spine Without Contrast Final [id number] [facility]   02/15/22 10:00 AM Echo Final [id number] [facility]

## 2022-02-22 NOTE — PT/OT/SLP EVAL
Physical Therapy Co-Evaluation and Co-Treatment    Patient Name:  Rachel Zazueta   MRN:  [id number]    Recommendations:     Discharge Recommendations:  rehabilitation facility   Discharge Equipment Recommendations:  (TBD by next level of care or pending pt progress)   Barriers to discharge: Increased level of assist and Inaccessible home    Assessment:     Rachel Zazueta is a 41 y.o. female admitted with a medical diagnosis of Spondylodiscitis.  She presents with the following impairments/functional limitations:  weakness, impaired endurance, impaired self care skills, impaired functional mobilty, gait instability, impaired balance, decreased lower extremity function, decreased safety awareness, decreased upper extremity function, pain, orthopedic precautions, decreased coordination. These deficits affect their roles and responsibilities in which they were able to complete prior to admit. Rachel Zazueta would benefit from acute PT intervention to improve quality of life and focus on recovery of impairments. Once medically stable, recommending pt discharge to Inpatient Rehabilitation Facility.     Blood observed in patient perineal area, RN notified.     Rehab Prognosis: Good; patient would benefit from acute skilled PT services 3 x/week to address these deficits and reach maximum level of function. Patient is most appropriate to go to rehabilitation facility.  Recent Surgery: Procedure(s) (LRB):  REMOVAL, HARDWARE, SPINE (N/A) 1 Day Post-Op    Plan:     During this hospitalization, patient to be seen 3 x/week to address the identified rehab impairments via gait training, therapeutic activities, therapeutic exercises, neuromuscular re-education and progress toward the following goals:    · Plan of Care Expires:  03/24/22    Subjective     Chief Complaint: pain with movement  Patient/Family Comments/Goals: to feel better and return to independent PLOF  Pain/Comfort:  · Pain Rating 1: 8/10  · Location -  "Orientation 1: generalized  · Location 1: back  · Pain Addressed 1: Pre-medicate for activity, Reposition, Distraction  · Pain Rating Post-Intervention 1: 8/10    Patients cultural, spiritual, Moravian conflicts given the current situation: no    Living Environment:  "Living Environment: lives w/ s.o. in mobile home w/ 5 [employer] [facility]; wis w/ threshold  Prior Level of Function: Patient reports being Mod I with mobility & assistance with LB ADLs. (last few weeks)  Roles/Repsonsibilities:   Hand Dominance: right   Work: no.   Drive: no.   Managing Medicines/Managing Home: yes.   Equipment Used at Home:  rollator, walker, rolling, bedside commode (hurrycane)     Patient reports they will have assistance from s.o. upon discharge."    Objective:     Communicated with nursing prior to session.  Patient found HOB elevated with wound vac, hemovac, telemetry, pulse ox (continuous), bed alarm, blood pressure cuff, gu catheter, peripheral IV, TLSO upon PT entry to room.    General Precautions: Standard, fall   Orthopedic Precautions:spinal precautions   Braces: TLSO    Exams:  · Cognitive Exam: Patient is oriented to Person, Place, Time, Situation, follows commands 100% of the time  · RLE ROM: WFL  · RLE Strength: Deficits: 3/5 observed, resistance not applied  · LLE ROM: WFL  · LLE Strength: Deficits: 3/5 observed, resistance not applied  · Gross Motor Coordination: WFL  · Fine Motor Coordination: BLE, heel/shin, WFL, but elicited pain    Functional Mobility:  · Bed Mobility:     · Rolling Left:  moderate assistance  · Scooting: maximal assistance  · Supine to Sit: maximal assistance and of 2 persons  · Sit to Supine: maximal assistance and of 2 persons  · Transfers:     · Sit to Stand:  minimum assistance with B hand-held assist  · Gait: ~ 3 shuffled steps to HOB with modA as patient was near the foot of the bed and returning to bed at the bottom would cause pt's legs to hang off the end and spine to be twisted.  · Balance: "   · Static Sitting: SBA  · Dynamic Sitting: SBA-CGA  · Static Standing: CGA-Yefri  · Dynamic Standing: modA for shuffled steps to HOB    Therapeutic Activities and Exercises:  Patient educated on role of acute care PT and PT POC, safety while in hospital including calling nurse for mobility and call light usage  Whiteboard updated.  Pt educated on spinal precautions and log roll technique this date.    AM-PAC 6 CLICK MOBILITY  Turning over in bed (including adjusting bedclothes, sheets and blankets)?: 2  Sitting down on and standing up from a chair with arms (e.g., wheelchair, bedside commode, etc.): 2  Moving from lying on back to sitting on the side of the bed?: 1  Moving to and from a bed to a chair (including a wheelchair)?: 2  Need to walk in hospital room?: 2  Climbing 3-5 steps with a railing?: 1  Basic Mobility Total Score: 10     Patient left supine with all lines intact, call button in reach, RN notified and bed alarm on.    GOALS:   Multidisciplinary Problems     Physical Therapy Goals        Problem: Physical Therapy Goal    Goal Priority Disciplines Outcome Goal Variances Interventions   Physical Therapy Goal     PT, PT/OT Ongoing, Progressing     Description: Goals to be met by: 3/8/2022     Patient will increase functional independence with mobility by performin. Supine to sit with MInimal Assistance  2. Sit to supine with MInimal Assistance  3. Rolling to Left and Right with Minimal Assistance.  4. Sit to stand transfer with Stand-by Assistance and RW  5. Bed to chair transfer with Minimal Assistance using Rolling Walker                     History:     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9    CHOLECYSTECTOMY      KIDNEY SURGERY      REMOVAL OF HARDWARE FROM SPINE N/A 2022     Procedure: REMOVAL, HARDWARE, SPINE;  Surgeon: Guille Templeton MD;  Location: [facility] OR [address] [place] [address];  Service: Neurosurgery;  Laterality: N/A;  [place]       Time Tracking:     PT Received On: 02/22/22  PT Start Time: 1008     PT Stop Time: 1026  PT Total Time (min): 18 min     Billable Minutes: Evaluation 10 Therapeutic Exercise 8    02/22/2022    Co-evaluation and co-treatment performed for this visit due to suspected patient need for two skilled therapists to ensure patient and staff safety and to accommodate for patient activity tolerance/pain management

## 2022-02-22 NOTE — SUBJECTIVE & OBJECTIVE
"Past Medical History:   Diagnosis Date    Anemia     Diskitis     Hep C w/o coma, chronic     IV drug abuse     Liver cirrhosis      Past Surgical History:   Procedure Laterality Date    BACK SURGERY      benine tumor removal      forehead, age 9    CHOLECYSTECTOMY      KIDNEY SURGERY        No current facility-administered medications on file prior to encounter.     Current Outpatient Medications on File Prior to Encounter   Medication Sig Dispense Refill    diclofenac sodium (VOLTAREN) 1 % Gel Apply 2 g topically 4 (four) times daily. 50 g 0    polyethylene glycol (GLYCOLAX) 17 gram/dose powder Take 17 g by mouth once daily. 510 g 0    pregabalin (LYRICA) 50 MG capsule Take 1 capsule (50 mg total) by mouth 3 (three) times daily. 90 capsule 6    sertraline (ZOLOFT) 25 MG tablet Take 1 tablet (25 mg total) by mouth once daily. 30 tablet 0      Allergies: Bee venom protein (honey bee), Wasp sting [allergen ext-venom-honey bee], Adhesive, Strawberry, Iodine and iodide containing products, Naproxen, Shellfish containing products, and Nuts [tree nut]    Family History   Problem Relation Age of Onset    Hepatitis Mother     Drug abuse Mother      Social History     Tobacco Use    Smoking status: Current Some Day Smoker     Packs/day: 0.25     Years: 23.00     Pack years: 5.75     Types: Cigarettes    Smokeless tobacco: Never Used   Substance Use Topics    Alcohol use: Not Currently    Drug use: Yes     Frequency: 4.0 times per week     Types: Methamphetamines, Marijuana     Comment: Cannibis 1/month.  Meth- Injection, 1/w, injection in the right hand and wrist. Denies shared needles with other people, but occasional reuse at times. In addition endorses using a "fresh point" for sites of injection. Last use 3 days prior that was inhal     Review of Systems   Unable to perform ROS: Acuity of condition   Objective:     Vitals:    Temp: 96.4 °F (35.8 °C)  Pulse: 83  Rhythm: normal sinus rhythm  BP: 106/71  MAP (mmHg): " 73  Resp: 18  SpO2: 100 %    Temp  Min: 96.4 °F (35.8 °C)  Max: 98.6 °F (37 °C)  Pulse  Min: 78  Max: 99  BP  Min: 93/51  Max: 119/55  MAP (mmHg)  Min: 71  Max: 83  Resp  Min: 16  Max: 24  SpO2  Min: 94 %  Max: 100 %    02/20 0701 - 02/21 0700  In: -   Out: 300 [Urine:300]   Unmeasured Output  Urine Occurrence: 1  Stool Occurrence: 0  Pad Count: 1       Physical Exam  Constitutional:       General: She is not in acute distress.     Appearance: She is not ill-appearing or toxic-appearing.   HENT:      Mouth/Throat:      Mouth: Mucous membranes are moist.   Cardiovascular:      Rate and Rhythm: Normal rate and regular rhythm.   Pulmonary:      Effort: No respiratory distress.      Breath sounds: No wheezing or rales.   Abdominal:      General: There is no distension.      Tenderness: There is no abdominal tenderness. There is no guarding or rebound.   Musculoskeletal:      Right lower leg: No edema.      Left lower leg: No edema.   Neurological:      General: No focal deficit present.      Mental Status: She is alert and oriented to person, place, and time.      Motor: Weakness present.         Today I personally reviewed pertinent medications, lines/drains/airways, imaging, laboratory results, notably:

## 2022-02-22 NOTE — PROGRESS NOTES
[facility]  Neurosurgery  Progress Note    Subjective:     History of Present Illness: Rachel Zazueta is a 41 y.o.  female with PMHx of Hepatitis C, liver cirrhosis, pancytopenia, and polysubstance abuse, who presents to clinic for follow up with new imaging s/p L3 and L4 laminectomy for evacuation of epidural abscess and L3-L5 TLIF on 04/16/2021.     The pt c/o worsening back and left leg pain since October. She states that she is no longer using IV drugs. States only using marijuana. Describes the left leg pain as a shock down the leg when standing for 10 minutes. Denies taking any antibiotics. Endorses new weakness in the legs. Denies b/b dysfunction. Denies new neck pain. She ambulates with a walker at home. States that she smoke.    She presents to ED as direct admit from clinic.       Post-Op Info:  Procedure(s) (LRB):  REMOVAL, HARDWARE, SPINE (N/A)   1 Day Post-Op     Interval History: NAEON. AFVSS. Exam stable.   No longer needing pressors.      Medications:  Continuous Infusions:   sodium chloride 0.9% Stopped (02/21/22 1629)    NORepinephrine bitartrate-D5W Stopped (02/21/22 2144)     Scheduled Meds:   cefTRIAXone (ROCEPHIN) IVPB  2 g Intravenous Q24H    pantoprazole  40 mg Oral Daily    polyethylene glycol  17 g Oral Daily    pregabalin  50 mg Oral TID    senna-docusate 8.6-50 mg  1 tablet Oral Daily    sertraline  50 mg Oral Daily    vancomycin (VANCOCIN) IVPB  15 mg/kg Intravenous Q12H     PRN Meds:sodium chloride, sodium chloride, sodium chloride, sodium chloride, sodium chloride, sodium chloride, acetaminophen, dextrose 10%, dextrose 10%, glucagon (human recombinant), glucose, glucose, glucose, HYDROmorphone, melatonin, oxyCODONE, Pharmacy to dose Vancomycin consult **AND** vancomycin - pharmacy to dose       Objective:     Weight: 133.8 kg (294 lb 15.6 oz)  Body mass index is 46.2 kg/m².  Vital Signs (Most Recent):  Temp: 98.2 °F (36.8 °C) (02/22/22 0705)  Pulse: 98  (02/22/22 0805)  Resp: 17 (02/22/22 0805)  BP: 129/71 (02/22/22 0805)  SpO2: 98 % (02/22/22 0805) Vital Signs (24h Range):  Temp:  [96.4 °F (35.8 °C)-98.8 °F (37.1 °C)] 98.2 °F (36.8 °C)  Pulse:  [77-99] 98  Resp:  [10-24] 17  SpO2:  [95 %-100 %] 98 %  BP: ()/(49-71) 129/71  Arterial Line BP: (108-140)/(59-88) 140/88     Date 02/22/22 0700 - 02/23/22 0659   Shift 6628-9809 1082-9503 7822-0064 24 Hour Total   INTAKE   Blood 115   115   IV Piggyback 100   100   Shift Total(mL/kg) 215(1.6)   215(1.6)   OUTPUT   Shift Total(mL/kg)       Weight (kg) 133.8 133.8 133.8 133.8                     Female External Urinary Catheter 02/14/22 2300 (Active)   Output (mL) 650 mL 02/15/22 0645       Physical Exam  General: well developed, well nourished, no distress.   Head: normocephalic, atraumatic  Neck: No tracheal deviation.   Neurologic: Alert and oriented. Thought content appropriate.  GCS: Motor: 6/Verbal: 5/Eyes: 4 GCS Total: 15  Mental Status: Awake, Alert, Oriented x 4  Language: No aphasia   Speech: No dysarthria  Cranial nerves: face symmetric, tongue midline, CN II-XII grossly intact, EOMI.   Pulmonary: normal respirations, no signs of respiratory distress  Abdomen: soft, non-distended, not tender to palpation  Sensory: intact to light touch throughout  Motor Strength: Moves all extremities spontaneously with good tone.  Pain limited weakness to proximal LLE. No abnormal movements seen.     Strength  Deltoids Triceps Biceps Wrist Extension Wrist Flexion Hand    Upper: R 5/5 5/5 5/5 5/5 5/5 5/5    L 5/5 5/5 5/5 5/5 5/5 5/5     Iliopsoas Quadriceps Knee  Flexion Tibialis  anterior Gastro- cnemius EHL   Lower: R 5/5 5/5 5/5 5/5 5/5 5/5    L 4+/5 4+/5 4+/5 5/5 5/5 5/5     Skin: Skin is warm, dry and intact.      Significant Labs:  Recent Labs   Lab 02/21/22  0444 02/21/22  1817 02/22/22  0342   GLU 91 148* 110    137 132*   K 4.1 4.8 4.5    107 104   CO2 27 22* 22*   BUN 10 13 15   CREATININE 0.6 0.6  0.5   CALCIUM 8.7 7.7* 8.3*       Recent Labs   Lab 02/21/22  0444 02/21/22  1817 02/22/22  0342   WBC 3.31* 7.49 9.30   HGB 11.0* 9.0* 9.3*   HCT 35.4* 28.8* 29.3*   * 229 165       Recent Labs   Lab 02/21/22  0444 02/22/22  0342   INR 1.3* 1.3*       Microbiology Results (last 7 days)       Procedure Component Value Units Date/Time    Culture, Anaerobic [[id number]] Collected: 02/15/22 1128    Order Status: Completed Specimen: Biopsy from Back Updated: 02/22/22 0656     Anaerobic Culture No anaerobes isolated    Narrative:      L3-4 / L4-5 osteodiscitis    Culture, Anaerobe [[id number]] Collected: 02/21/22 1539    Order Status: Completed Specimen: Abscess from Back Updated: 02/22/22 0648     Anaerobic Culture Culture in progress    Narrative:      Epidural purulence #1    Culture, Anaerobe [[id number]] Collected: 02/21/22 1539    Order Status: Completed Specimen: Abscess from Back Updated: 02/22/22 0648     Anaerobic Culture Culture in progress    Narrative:      Epidural purulence #2    Aerobic culture [[id number]] Collected: 02/21/22 1539    Order Status: Completed Specimen: Abscess from Back Updated: 02/22/22 0632     Aerobic Bacterial Culture No growth    Narrative:      Epidural purulence #2    Gram stain [[id number]] Collected: 02/21/22 1539    Order Status: Completed Specimen: Abscess from Back Updated: 02/21/22 1753     Gram Stain Result No organisms seen      Few WBC's    Narrative:      Epidural purulence #2    Gram stain [[id number]] Collected: 02/21/22 1539    Order Status: Completed Specimen: Abscess from Back Updated: 02/21/22 1751     Gram Stain Result No organisms seen      Few WBC's    Narrative:      Epidural purulence #1    Aerobic culture [[id number]] Collected: 02/21/22 1539    Order Status: Sent Specimen: Abscess from Back Updated: 02/21/22 1659    AFB Culture & Smear [[id number]] Collected: 02/21/22 1539    Order Status: Sent Specimen: Abscess from Back Updated: 02/21/22 1658    Fungus  culture [[id number]] Collected: 02/21/22 1539    Order Status: Sent Specimen: Abscess from Back Updated: 02/21/22 1657    Fungus culture [[id number]] Collected: 02/21/22 1539    Order Status: Sent Specimen: Abscess from Back Updated: 02/21/22 1656    AFB Culture & Smear [[id number]] Collected: 02/21/22 1539    Order Status: Sent Specimen: Abscess from Back Updated: 02/21/22 1655    Fungus culture [[id number]] Collected: 02/15/22 1128    Order Status: Completed Specimen: Biopsy from Back Updated: 02/21/22 1150     Fungus (Mycology) Culture Culture in progress    Narrative:      L3-4 / L4-5 osteodiscitis    Blood Culture #2 **CANNOT BE ORDERED STAT** [[id number]] Collected: 02/14/22 1820    Order Status: Completed Specimen: Blood from Peripheral, Antecubital, Right Updated: 02/19/22 2012     Blood Culture, Routine No growth after 5 days.    Blood Culture #1 **CANNOT BE ORDERED STAT** [[id number]] Collected: 02/14/22 1820    Order Status: Completed Specimen: Blood from Peripheral, Forearm, Right Updated: 02/19/22 2012     Blood Culture, Routine No growth after 5 days.    Aerobic culture [[id number]] Collected: 02/15/22 1128    Order Status: Completed Specimen: Biopsy from Back Updated: 02/19/22 1030     Aerobic Bacterial Culture No growth    Narrative:      L3-4 / L4-5 osteodiscitis    AFB Culture & Smear [[id number]] Collected: 02/15/22 1128    Order Status: Completed Specimen: Biopsy from Back Updated: 02/16/22 2127     AFB Culture & Smear Culture in progress     AFB CULTURE STAIN No acid fast bacilli seen.    Narrative:      L3-4 / L4-5 osteodiscitis    Gram stain [[id number]] Collected: 02/15/22 1128    Order Status: Completed Specimen: Biopsy from Back Updated: 02/15/22 1815     Gram Stain Result Rare WBC's      No organisms seen    Narrative:      L3-4 / L4-5 osteodiscitis          Recent Lab Results         02/22/22  0342   02/21/22  1817   02/21/22  1539        Aerobic Bacterial Culture     No growth  [P]       Albumin  2.2   2.1         Alkaline Phosphatase 97   92         ALT 19   21         Anaerobic Culture     Culture in progress  [P]            Culture in progress  [P]       Anion Gap 6   8         Aniso   Slight         AST 48   66  Comment: *Result may be interfered by visible hemolysis         Baso # 0.02   0.03         Basophil % 0.2   0.4         BILIRUBIN TOTAL 2.7  Comment: For infants and newborns, interpretation of results should be based  on gestational age, weight and in agreement with clinical  observations.    Premature Infant recommended reference ranges:  Up to 24 hours.............<8.0 mg/dL  Up to 48 hours............<12.0 mg/dL  3-5 days..................<15.0 mg/dL  6-29 days.................<15.0 mg/dL     1.5  Comment: For infants and newborns, interpretation of results should be based  on gestational age, weight and in agreement with clinical  observations.    Premature Infant recommended reference ranges:  Up to 24 hours.............<8.0 mg/dL  Up to 48 hours............<12.0 mg/dL  3-5 days..................<15.0 mg/dL  6-29 days.................<15.0 mg/dL           BUN 15   13         Calcium 8.3   7.7         Chloride 104   107         CO2 22   22         Creatinine 0.5   0.6         Differential Method Automated   Automated         eGFR if  >60.0   >60.0         eGFR if non  >60.0  Comment: Calculation used to obtain the estimated glomerular filtration  rate (eGFR) is the CKD-EPI equation.      >60.0  Comment: Calculation used to obtain the estimated glomerular filtration  rate (eGFR) is the CKD-EPI equation.            Eos # 0.0   0.0         Eosinophil % 0.0   0.3         Glucose 110   148         Gram Stain Result     No organisms seen            Few WBC's            No organisms seen            Few WBC's       Gran # (ANC) 8.3   6.6         Gran % 89.0   87.8         Hematocrit 29.3   28.8         Hemoglobin 9.3   9.0         Hypo   Occasional         Immature  Grans (Abs) 0.05  Comment: Mild elevation in immature granulocytes is non specific and   can be seen in a variety of conditions including stress response,   acute inflammation, trauma and pregnancy. Correlation with other   laboratory and clinical findings is essential.     0.09  Comment: Mild elevation in immature granulocytes is non specific and   can be seen in a variety of conditions including stress response,   acute inflammation, trauma and pregnancy. Correlation with other   laboratory and clinical findings is essential.           Immature Granulocytes 0.5   1.2         INR 1.3  Comment: Coumadin Therapy:  2.0 - 3.0 for INR for all indicators except mechanical heart valves  and antiphospholipid syndromes which should use 2.5 - 3.5.             Lymph # 0.6   0.7         Lymph % 6.1   9.1         MCH 28.3   27.9         MCHC 31.7   31.3         MCV 89   89         Mono # 0.4   0.1         Mono % 4.2   1.2         MPV 9.5   10.0         nRBC 0   0         Ovalocytes   Occasional         Platelet Estimate   Appears normal         Platelets 165   229         Poik   Slight         Poly   Occasional         Potassium 4.5   4.8         PROTEIN TOTAL 6.8   6.9         Protime 13.7           RBC 3.29   3.23         RDW 17.2   17.9         Sodium 132   137         WBC 9.30   7.49                  [P] - Preliminary Result             All pertinent labs from the last 24 hours have been reviewed.    Significant Diagnostics:  I have reviewed all pertinent imaging results/findings within the past 24 hours.  No results found in the last 24 hours.      Review of Systems  Neurosurgery Physical Exam    Assessment/Plan:     * Spondylodiscitis  Rachel Zazueta is a 41 F with PMH hepatitis C, cirrhosis, pancytopenia, nicotine abuse, hx IVDU (last use 2 years ago), strep agalactae bacteremia, s/p L3-5 TLIF on 4/16/2021 who presents with persistent and progressive lumbar spondylodiscitis with resultant hardware failure and new  scoliosis.     --Patient admitted to [facility] on telemetry;       -q4h neurochecks on floor     -  Okay for transfer to floor.  Needs TLSO and standing x ray today prior to ambulation   -okay to start hsq   --XR lumbar spine flex/ext completed.   --All labs and diagnostics reviewed  --Follow-up MRI Brain, C/T/L w/wo contrast   -No evidence of osteomyelitis, discitis, or epidural abscess within the cervical or thoracic spine   -No diffusion positive or abnormal enhancement within the brain   -MRI brain shows patchy nonspecific increased T2 and FLAIR signal in the periventricular and subcortical white matter bilaterally. Could be due to chronic microvascular ischemic changes vs demyelinating disease given patient's age. No evidence of active demyelination.   -DDD C4-7 with mild to moderate canal stenosis  --Full infectious w/u    -ESR 57, CRP 29, Procal 0.05, Lactate 1.1, BCx 2/14 NGTD, UA/Ucx pending   -F/u ID recs - started on empiric abx, continue.  --S/p L3-4 disc biopsy and L3 bone biopsy with IR 2/15 - Gram stain rare WBC. Cx NGTD.  -- consult for risk stratification and medical optimization. Appreciate assistance. RCRI 0, 3.9% risk of perioperative cardiac complication.   --Utox + for amphetamines, addiction psych consulted per .   --Hold anti-plt/coag medications  --Monitor for urinary retention - voiding spontaneous. Bladder scan prn.  --Continue current regimen for pain control   --Continue to monitor clinically, notify NSGY immediately with any changes in neuro status    Dispo: pending surgical intervention         Jaylon Gomes MD  Neurosurgery  [facility]

## 2022-02-22 NOTE — SUBJECTIVE & OBJECTIVE
Interval History:  POD 1 hardware removal and washout. Required 1 unit PRBCs overnight. Off levo. Patient continues on vanc and ceftriaxone. Pain control with IV dilaudid and PO oxycodone. Plan for post op xray today.    Review of Systems   Constitutional:  Positive for activity change. Negative for chills and fever.   Respiratory:  Negative for cough and shortness of breath.    Cardiovascular:  Negative for chest pain.   Gastrointestinal:  Negative for abdominal distention and abdominal pain.   Musculoskeletal:  Positive for arthralgias and back pain.   Skin:  Positive for wound.   Neurological:  Negative for dizziness, light-headedness and headaches.   Psychiatric/Behavioral:  Negative for agitation and behavioral problems.      Objective:     Vitals:  Temp: 98.2 °F (36.8 °C)  Pulse: 103  Rhythm: normal sinus rhythm  BP: 138/70  MAP (mmHg): 95  Resp: 20  SpO2: 98 %  O2 Device (Oxygen Therapy): room air    Temp  Min: 96.4 °F (35.8 °C)  Max: 98.8 °F (37.1 °C)  Pulse  Min: 77  Max: 103  BP  Min: 99/55  Max: 138/70  MAP (mmHg)  Min: 71  Max: 95  Resp  Min: 10  Max: 24  SpO2  Min: 95 %  Max: 100 %    02/21 0701 - 02/22 0700  In: 3756.2 [I.V.:1537.5]  Out: 3365 [Urine:1355; Drains:610]   Unmeasured Output  Urine Occurrence: 1  Stool Occurrence: 0  Pad Count: 1       Physical Exam  Constitutional:       General: She is not in acute distress.     Appearance: She is not ill-appearing or toxic-appearing.   HENT:      Mouth/Throat:      Mouth: Mucous membranes are moist.   Cardiovascular:      Rate and Rhythm: Normal rate and regular rhythm.   Pulmonary:      Effort: No respiratory distress.      Breath sounds: No wheezing or rales.   Abdominal:      General: There is no distension.      Tenderness: There is no abdominal tenderness. There is no guarding or rebound.   Musculoskeletal:      Right lower leg: No edema.      Left lower leg: No edema.   Neurological:      General: No focal deficit present.      Mental Status: She  is alert and oriented to person, place, and time.      Motor: Weakness present.         Medications:  Continuous ScheduledcefTRIAXone (ROCEPHIN) IVPB, 2 g, Q24H  heparin (porcine), 5,000 Units, Q8H  pantoprazole, 40 mg, Daily  polyethylene glycol, 17 g, Daily  pregabalin, 50 mg, TID  senna-docusate 8.6-50 mg, 1 tablet, Daily  sertraline, 50 mg, Daily  vancomycin (VANCOCIN) IVPB, 15 mg/kg, Q12H    PRNacetaminophen, 650 mg, Q6H PRN  dextrose 10%, 12.5 g, PRN  dextrose 10%, 25 g, PRN  glucagon (human recombinant), 1 mg, PRN  glucose, 16 g, PRN  glucose, 24 g, PRN  HYDROmorphone, 1 mg, Q4H PRN  melatonin, 6 mg, Nightly PRN  oxyCODONE, 10 mg, Q4H PRN  vancomycin - pharmacy to dose, , pharmacy to manage frequency      Today I personally reviewed pertinent medications, lines/drains/airways, imaging, laboratory results, notably:    Diet  Diet Adult Regular (IDDSI Level 7)  Diet Adult Regular (IDDSI Level 7)

## 2022-02-22 NOTE — PT/OT/SLP EVAL
Speech Language Pathology Evaluation  Bedside Swallow/ Discharge Summary     Patient Name:  Rachel Zazueta   MRN:  [id number]  Admitting Diagnosis: Spondylodiscitis    Recommendations:                 General Recommendations:  Dysphagia therapy  Diet recommendations:  Regular, Thin   Aspiration Precautions: Standard aspiration precautions   General Precautions: Standard,    Communication strategies:  none    History:     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  female with PMHx of Hepatitis C, liver cirrhosis, pancytopenia, and polysubstance abuse, who presents to clinic for follow up with new imaging s/p L3 and L4 laminectomy for evacuation of epidural abscess and L3-L5 TLIF on 04/16/2021.     The pt c/o worsening back and left leg pain since October. She states that she is no longer using IV drugs. States only using marijuana. Describes the left leg pain as a shock down the leg when standing for 10 minutes. Denies taking any antibiotics. Endorses new weakness in the legs. Denies b/b dysfunction. Denies new neck pain. She ambulates with a walker at home. States that she smoke.     She presents to ED as direct admit from clinic.      Prior Intubation HX:  For surgical intervention less than 24 hrs     Modified Barium Swallow: none prior     Prior diet: regular unrestricted diet at baseline, had passed Miller and advanced to regular diet prior to assessment     Subjective     Pt awake alert and pleasant    Back pain limiting positioning to fully upright position, ~ 70 degree incline achieved     Pain/Comfort:  · Pain Rating 1: 8/10  · Location 1: back  · Pain Addressed 1: Nurse notified, Reposition, Distraction  · Pain Rating Post-Intervention 1: 8/10    Respiratory Status: Room air    Objective:     Oral Musculature Evaluation  · Oral Musculature: WFL  · Dentition: present and adequate  · Mandibular Strength and Mobility: WFL  · Oral Labial Strength and Mobility: WFL  · Volitional Cough: strong  · Volitional Swallow: prompt  · Voice Prior to PO Intake: strong and clear    Bedside Swallow Eval:   Consistencies Assessed:  · Thin liquids 6 oz via cyclic straw sips    · Pt had already completed AM meal and took pills whole prior to SLP arrival and both pt and RN deny any feeding or swallowing difficulties     Oral Phase:   · WFL    Pharyngeal Phase:   · no overt clinical signs/symptoms of aspiration  · no overt clinical signs/symptoms of pharyngeal dysphagia    Compensatory Strategies  · None    Treatment:  Education provided to Pt re: SLP role in acute care setting, overall impressions and therapeutic goals. Whiteboard updated.  SLP discussed pt with intact oral feeding and swallow function without need for additional skilled speech services at this time.       Assessment:     Rachel Zazueta is a 41 y.o. female with an SLP diagnosis of  pt with intact oral feeding and swallow function without need for additional skilled speech services at this time    Plan:     · Patient to be seen:      · Plan of Care expires:     · Plan of Care reviewed with:  patient   · SLP Follow-Up:  No       Discharge recommendations:   (no further ST needs)   Barriers to Discharge:  None per ST     Time Tracking:     SLP Treatment Date:   02/22/22  Speech Start Time:  0921  Speech Stop Time:  0930     Speech Total Time (min):  9 min    Billable Minutes: Eval Swallow and Oral Function 9    02/22/2022

## 2022-02-22 NOTE — ASSESSMENT & PLAN NOTE
U/S abdomen with doppler showed cirrhosis and portal hypertension satisfactory Doppler evaluation   daily CMP and INR  will need hepatology referral at time of discharge  Lipase normal

## 2022-02-22 NOTE — PHYSICIAN QUERY
PT Name: Rachel Zazueta  MR #: [id number]     DOCUMENTATION CLARIFICATION      CDS/: Edith Arnold RN, CDS               Contact information: [email or web address]    This form is a permanent document in the medical record.     Query Date: February 22, 2022    Dear Provider,  By submitting this query, we are merely seeking further clarification of documentation.  Please utilize your independent clinical judgment when addressing the question(s) below.     The Medical Record contains the following:    Supporting Clinical Findings Location in Medical Record   --Chief Complaint/Reason for Admission: Osteodiscitis           -presents to clinic for follow up with new imaging s/p L3 and L4 laminectomy for evacuation of epidural abscess and L3-L5 TLIF on 04/16/2021   NeuroSx H&P 2/14   --Spondylodiscitis        -MRI C/T/L spine showed suspected discitis/osteomyelitis at L3-4 and L4-5 with probable hardware infection.           Hosp med 2/15   --Spondylodiscitis          - L3-4 intervertebral disc biopsy on 2/15          - IR cultures and blood cx are NGTD.           -Continue empiric Vancomycin and Ceftriaxone   ID Note 2/16   --who presents with persistent and progressive lumbar spondylodiscitis with resultant hardware failure and new scoliosis.             - -No evidence of osteomyelitis, discitis, or epidural abscess within the cervical or thoracic spine             -S/p L3-4 disc biopsy and L3 bone biopsy with IR 2/15 - Gram stain rare WBC. Cx NGTD              -F/u ID recs - started on empiric abx, continue.   NeuroSx Note 2/22   --Compared to prior studies, there are increased destructive endplate changes at L3-4, new destructive endplate changes at L4-5, and worsening alignment abnormalities, with adjacent soft tissue edema and lymphadenopathy, highly concerning for discitis osteomyelitis.   CT Lumbar Spine 2/14   --Suspected discitis/osteomyelitis of the lumbar spine at L3-4 and L4-5 with  postoperative changes of posterior fusion from L3 through L5 with probable hardware infection   MRI spine 2/14   --Aerobic Bacterial Culture No growth   --Anaerobic Culture No anaerobes isolated   --Gram Stain Result Rare WBC's   --Gram Stain Result No organisms seen    Back Biopsy results 2/15   --cefTRIAXone (ROCEPHIN) 2 g/50 mL D5W IVPB q24h (started 2/15)  --vancomycin 2 g in dextrose 5 % 500 mL IVPB q12h (2/15-2/18)  --vancomycin (VANCOCIN) 2,250 mg in dextrose 5 % 500 mL IVPB q12h (2/18-2/20)  --vancomycin 2 g in dextrose 5 % 500 mL IVPB q12h (started 2/21)   MAR     Please clarify if the _Suspected Osteodiscitis/osteomyelitis_ diagnosis has been:    [ x ] Ruled In  (please clarify acuity):        [  ] Acute     [ x ] Chronic   [  ] Other: ________     [  ] Unspecified     [  ] Ruled Out     [  ] Other/Clarification of findings (please specify): _______________      [   ] Clinically undetermined     Please document in your progress notes daily for the duration of treatment, until resolved, and include in your discharge summary.    Form No. [id number]

## 2022-02-22 NOTE — H&P
[facility] - Neuro Critical Care  Neurocritical Care  History & Physical    Admit Date: 2/14/2022  Service Date: 02/21/2022  Length of Stay: 7    Subjective:     Chief Complaint: Spondylodiscitis    History of Present Illness: Ms. Zazueta is a 40 yo F PMH cirrhosis, Hep C, pancytopenia, polysubstance abuse, epidural abscess s/p L3-L4 laminectomy and L3-L5 TLIF (4/2021; blood and surgical cultures positive for group B strep) who is admitted to the neuro ICU after hardware removal and washout. Per chart review patient had worsening back and left leg pain since October. The left leg pain is a shocking pain if she stands for 10 minutes. Also reports new weakness in the legs. Denies bowel/bladder dsyfunction or neck pain. Uses a walker to ambulate at home. Endorses cigarette and marijuana use denies Iv drug use for 2 years. Patient was on vanc and ceftriaxone prior to the surgery. She required platelets prior to the procedure, PRBCs intraoperatively and estimated blood loss 1L. On arrival to the neuro ICU she was hypotensive requiring levo and in pain.      Past Medical History:   Diagnosis Date    Anemia     Diskitis     Hep C w/o coma, chronic     IV drug abuse     Liver cirrhosis      Past Surgical History:   Procedure Laterality Date    BACK SURGERY      benine tumor removal      forehead, age 9    CHOLECYSTECTOMY      KIDNEY SURGERY        No current facility-administered medications on file prior to encounter.     Current Outpatient Medications on File Prior to Encounter   Medication Sig Dispense Refill    diclofenac sodium (VOLTAREN) 1 % Gel Apply 2 g topically 4 (four) times daily. 50 g 0    polyethylene glycol (GLYCOLAX) 17 gram/dose powder Take 17 g by mouth once daily. 510 g 0    pregabalin (LYRICA) 50 MG capsule Take 1 capsule (50 mg total) by mouth 3 (three) times daily. 90 capsule 6    sertraline (ZOLOFT) 25 MG tablet Take 1 tablet (25 mg total) by mouth once daily. 30 tablet 0      Allergies:  "Bee venom protein (honey bee), Wasp sting [allergen ext-venom-honey bee], Adhesive, Strawberry, Iodine and iodide containing products, Naproxen, Shellfish containing products, and Nuts [tree nut]    Family History   Problem Relation Age of Onset    Hepatitis Mother     Drug abuse Mother      Social History     Tobacco Use    Smoking status: Current Some Day Smoker     Packs/day: 0.25     Years: 23.00     Pack years: 5.75     Types: Cigarettes    Smokeless tobacco: Never Used   Substance Use Topics    Alcohol use: Not Currently    Drug use: Yes     Frequency: 4.0 times per week     Types: Methamphetamines, Marijuana     Comment: Cannibis 1/month.  Meth- Injection, 1/w, injection in the right hand and wrist. Denies shared needles with other people, but occasional reuse at times. In addition endorses using a "fresh point" for sites of injection. Last use 3 days prior that was inhal     Review of Systems   Unable to perform ROS: Acuity of condition   Objective:     Vitals:    Temp: 96.4 °F (35.8 °C)  Pulse: 83  Rhythm: normal sinus rhythm  BP: 106/71  MAP (mmHg): 73  Resp: 18  SpO2: 100 %    Temp  Min: 96.4 °F (35.8 °C)  Max: 98.6 °F (37 °C)  Pulse  Min: 78  Max: 99  BP  Min: 93/51  Max: 119/55  MAP (mmHg)  Min: 71  Max: 83  Resp  Min: 16  Max: 24  SpO2  Min: 94 %  Max: 100 %    02/20 0701 - 02/21 0700  In: -   Out: 300 [Urine:300]   Unmeasured Output  Urine Occurrence: 1  Stool Occurrence: 0  Pad Count: 1       Physical Exam  Constitutional:       General: She is not in acute distress.     Appearance: She is not ill-appearing or toxic-appearing.   HENT:      Mouth/Throat:      Mouth: Mucous membranes are moist.   Cardiovascular:      Rate and Rhythm: Normal rate and regular rhythm.   Pulmonary:      Effort: No respiratory distress.      Breath sounds: No wheezing or rales.   Abdominal:      General: There is no distension.      Tenderness: There is no abdominal tenderness. There is no guarding or rebound. "   Musculoskeletal:      Right lower leg: No edema.      Left lower leg: No edema.   Neurological:      General: No focal deficit present.      Mental Status: She is alert and oriented to person, place, and time.      Motor: Weakness present.         Today I personally reviewed pertinent medications, lines/drains/airways, imaging, laboratory results, notably:        Assessment/Plan:     Psychiatric  Mild episode of recurrent major depressive disorder  Continue home sertraline 50mg daily    ID  * Spondylodiscitis  Patient initially sent to the hospital from nsgy clinic for infected hardware.  Of note history of IVDU and TLIF of L3-L5 on 04/16/2021 for previous spinal abscess. This hospital admission MRI C/T/L spine showed suspected discitis/osteomyelitis at L3-4 and L4-5 with probable hardware infection. Afebrile, white count 3.31 at time of admission to the ICU.     Continue vancomycin and zosyn   Washout and hardware removal with neurosurgery 2/21  Estimated 1L blood loss intraop, required 1L PRBCs intraoperatively  Monitor drain output  ID following        Oncology  Pancytopenia  Hx of bone marrow biopsy 2017 that was nondiagnostic.      Recommendations:  - daily labs  - transfuse for Hgb <7  - transfuse for plt <10 or <50 with bleeding    GI  Chronic hepatitis C with cirrhosis  U/S abdomen with doppler showed cirrhosis and portal hypertension satisfactory Doppler evaluation   daily CMP and INR  will need hepatology referral on discharge   check lipase given RUQ/epigastric pain     Other  Hyperbilirubinemia  Tbili 2.1 on admission.      Recommendations:  daily CMP, trend Tbili         The patient is being Prophylaxed for:  Venous Thromboembolism with: Mechanical  Stress Ulcer with: PPI  Ventilator Pneumonia with: not applicable    Activity Orders          Diet Adult Regular (IDDSI Level 7): Regular starting at 02/21 1837    Progressive Mobility Protocol (mobilize patient to their highest level of functioning at  least twice daily) starting at 02/14 2000        Full Code    Mercedez Montiel MD  Neurocritical Care  [facility] - Neuro Critical Care

## 2022-02-22 NOTE — PLAN OF CARE
[facility] Care Plan    POC reviewed with Rachel Zazueta at 2300. Pt verbalized understanding. Questions and concerns addressed. No acute events overnight. 1 unit PRBC given, Levophed titrated off. Dilaudid x2, oxycodon x1 for pain. Awaiting TLSO brace. Hemovacs with sanguinous drainage, woundvac to midline spinal incision. Pt progressing toward goals. Will continue to monitor. See below and flowsheets for full assessment and VS info.     Neuro:  GCS 15  Afebrile  Pt states movement in BLE has improved.    CV:   Rhythm: normal sinus rhythm  BP goals:   SBP < 180  MAP > 65    Resp:   O2 Device (Oxygen Therapy): room air  Plan: N/A    GI/:     Diet/Nutrition Received: regular  Last Bowel Movement: 02/20/22  Voiding Characteristics: urethral catheter (bladder)    Intake/Output Summary (Last 24 hours) at 2/22/2022 0633  Last data filed at 2/22/2022 0301  Gross per 24 hour   Intake 3756.19 ml   Output 3175 ml   Net 581.19 ml     Unmeasured Output  Urine Occurrence: 1  Stool Occurrence: 0  Pad Count: 1    Labs/Accuchecks:  Recent Labs   Lab 02/22/22  0342   WBC 9.30   RBC 3.29*   HGB 9.3*   HCT 29.3*         Recent Labs   Lab 02/22/22  0342   *   K 4.5   CO2 22*      BUN 15   CREATININE 0.5   ALKPHOS 97   ALT 19   AST 48*   BILITOT 2.7*      Recent Labs   Lab 02/22/22  0342   INR 1.3*      Electrolytes: No replacement orders  Accuchecks: none    Gtts:   sodium chloride 0.9% Stopped (02/21/22 1629)    NORepinephrine bitartrate-D5W Stopped (02/21/22 2144)       LDA/Wounds:  Lines/Drains/Airways       Drain  Duration                  Closed/Suction Drain 02/21/22 1644 Left;Medial Back Accordion 10 Fr. <1 day         Closed/Suction Drain 02/21/22 1645 Right;Medial Back Accordion 10 Fr. <1 day         Urethral Catheter 02/21/22 1415 Non-latex;Straight-tip 16 Fr. <1 day              Arterial Line  Duration             Arterial Line 02/21/22 1400 Right Radial <1 day              Peripheral Intravenous Line   Duration                  Peripheral IV - Single Lumen 02/21/22 1355 16 G Left Forearm <1 day         Peripheral IV - Single Lumen 02/21/22 1824 20 G Anterior;Left;Proximal Forearm <1 day                  Wounds: Yes  Wound care consulted: No

## 2022-02-22 NOTE — PHYSICIAN QUERY
PT Name: Rachel Zazueta  MR #: [id number]    DOCUMENTATION CLARIFICATION      CDS/: Edith Arnold RN, CDS              Contact information: [email or web address]    This form is a permanent document in the medical record.      Query Date: February 22, 2022    By submitting this query, we are merely seeking further clarification of documentation. Please utilize your independent clinical judgment when addressing the question(s) below.    The Medical Record contains the following:   Indicators  Supporting Clinical Findings Location in Medical Record   x Anemia documented --Pancytopenia   NCC H&P 2/21   x H&H Hemoglobin 10.3 (L) 11.0 (L) 9.0 (L) 9.3 (L)   Hematocrit 33.6 (L) 35.4 (L) 28.8 (L) 29.3 (L)    Labs 2/19->2/22    BP                    HR      GI bleeding documented     x Acute bleeding (Non GI site) --Estimated 1L blood loss intraop, required 1L PRBCs intraoperatively    --Estimated Blood Loss: 1400 mL NCC H&P 2/21      Brief Op-Note 2/21     x Transfusion(s) -- s/p RBC 1 Unit   Blood bank flowsheet 2/21   x Acute/Chronic illness --On arrival to the neuro ICU she was hypotensive requiring levo and in pain     NCC H&P 2/21    Treatments      Other       Provider, please specify diagnosis or diagnoses associated with above clinical findings.     [  x ] Acute blood loss anemia expected post-operatively    [   ] Precipitous drop in Hematocrit   [   ] Anemia, unspecified    [   ] Other Hematological Diagnosis (please specify): _________________   [   ] Clinically Undetermined            Please document in your progress notes daily for the duration of treatment, until resolved, and include in your discharge summary.    Form No. [id number]

## 2022-02-22 NOTE — PLAN OF CARE
[facility] Care Plan    POC reviewed with Rachel Zazueta at 1800. Pt could not verbalize understanding. Questions and concerns addressed. Pt hypotensive on arrival, started on Levo for MAPs in high 40s. Pt passed Miller. New IV access obtained. Pt progressing toward goals. Will continue to monitor. See below and flowsheets for full assessment and VS info.       Neuro:  [place] Coma Scale  Best Eye Response: 4-->(E4) spontaneous  Best Motor Response: 6-->(M6) obeys commands  Best Verbal Response: 5-->(V5) oriented  [place] Coma Scale Score: 15  Assessment Qualifiers: patient chemically sedated or paralyzed, no eye obstruction present  Pupil PERRLA: yes     24 hr Temp:  [96.4 °F (35.8 °C)-98.6 °F (37 °C)]     CV:   Rhythm: normal sinus rhythm  BP goals:   SBP < 180  MAP > 65    Resp:   O2 Device (Oxygen Therapy): room air       Plan: N/A    GI/:     Diet/Nutrition Received: regular  Last Bowel Movement: 02/20/22  Voiding Characteristics: external catheter    Intake/Output Summary (Last 24 hours) at 2/21/2022 1903  Last data filed at 2/21/2022 1718  Gross per 24 hour   Intake 3000 ml   Output 2100 ml   Net 900 ml     Unmeasured Output  Urine Occurrence: 1  Stool Occurrence: 0  Pad Count: 1    Labs/Accuchecks:  Recent Labs   Lab 02/21/22  1817   WBC 7.49   RBC 3.23*   HGB 9.0*   HCT 28.8*         Recent Labs   Lab 02/21/22  0444      K 4.1   CO2 27      BUN 10   CREATININE 0.6   ALKPHOS 117   ALT 19   AST 50*   BILITOT 1.6*      Recent Labs   Lab 02/14/22  2032 02/16/22  0640 02/21/22  0444   INR 1.3*   < > 1.3*   APTT 29.9  --   --     < > = values in this interval not displayed.    No results for input(s): CPK, CPKMB, TROPONINI, MB in the last 168 hours.    Electrolytes: N/A - electrolytes WDL  Accuchecks: none    Gtts:   sodium chloride 0.9% Stopped (02/21/22 1629)    NORepinephrine bitartrate-D5W 0.04 mcg/kg/min (02/21/22 1837)       LDA/Wounds:  Lines/Drains/Airways       Drain  Duration                   Closed/Suction Drain 02/21/22 1644 Left;Medial Back Accordion 10 Fr. <1 day         Closed/Suction Drain 02/21/22 1645 Right;Medial Back Accordion 10 Fr. <1 day         Urethral Catheter 02/21/22 1415 Non-latex;Straight-tip 16 Fr. <1 day              Arterial Line  Duration             Arterial Line 02/21/22 1400 Right Radial <1 day              Peripheral Intravenous Line  Duration                  Peripheral IV - Single Lumen 02/21/22 1355 16 G Left Forearm <1 day         Peripheral IV - Single Lumen 02/21/22 1824 20 G Anterior;Left;Proximal Forearm <1 day                  Wounds: Yes  Wound care consulted: No

## 2022-02-22 NOTE — ASSESSMENT & PLAN NOTE
Rachel Zazueta is a 41 F with PMH hepatitis C, cirrhosis, pancytopenia, nicotine abuse, hx IVDU (last use 2 years ago), strep agalactae bacteremia, s/p L3-5 TLIF on 4/16/2021 who presents with persistent and progressive lumbar spondylodiscitis with resultant hardware failure and new scoliosis.     --Patient admitted to [facility] on telemetry;       -q4h neurochecks on floor     -  Okay for transfer to floor.  Needs TLSO and standing x ray today prior to ambulation   -okay to start hsq   --XR lumbar spine flex/ext completed.   --All labs and diagnostics reviewed  --Follow-up MRI Brain, C/T/L w/wo contrast   -No evidence of osteomyelitis, discitis, or epidural abscess within the cervical or thoracic spine   -No diffusion positive or abnormal enhancement within the brain   -MRI brain shows patchy nonspecific increased T2 and FLAIR signal in the periventricular and subcortical white matter bilaterally. Could be due to chronic microvascular ischemic changes vs demyelinating disease given patient's age. No evidence of active demyelination.   -DDD C4-7 with mild to moderate canal stenosis  --Full infectious w/u    -ESR 57, CRP 29, Procal 0.05, Lactate 1.1, BCx 2/14 NGTD, UA/Ucx pending   -F/u ID recs - started on empiric abx, continue.  --S/p L3-4 disc biopsy and L3 bone biopsy with IR 2/15 - Gram stain rare WBC. Cx NGTD.  -- consult for risk stratification and medical optimization. Appreciate assistance. RCRI 0, 3.9% risk of perioperative cardiac complication.   --Utox + for amphetamines, addiction psych consulted per .   --Hold anti-plt/coag medications  --Monitor for urinary retention - voiding spontaneous. Bladder scan prn.  --Continue current regimen for pain control   --Continue to monitor clinically, notify NSGY immediately with any changes in neuro status    Dispo: pending surgical intervention

## 2022-02-22 NOTE — PROCEDURES
"Rachel Zazueta is a 41 y.o. female patient.    Temp: 98.2 °F (36.8 °C) (02/22/22 0705)  Pulse: 103 (02/22/22 1005)  Resp: 20 (02/22/22 1006)  BP: 138/70 (02/22/22 1005)  SpO2: 98 % (02/22/22 1005)  Weight: 133.8 kg (294 lb 15.6 oz) (02/22/22 0201)  Height: 5' 7" (170.2 cm) (02/22/22 0201)    PICC  Date/Time: 2/22/2022 11:05 AM  Performed by: Nuha Newman RN  Assisting provider: Lois Guzman LPN  Consent Done: Yes  Time out: Immediately prior to procedure a time out was called to verify the correct patient, procedure, equipment, support staff and site/side marked as required  Indications: med administration and vascular access  Anesthesia: local infiltration  Local anesthetic: lidocaine 1% without epinephrine  Anesthetic Total (mL): 39  Preparation: skin prepped with ChloraPrep  Skin prep agent dried: skin prep agent completely dried prior to procedure  Sterile barriers: all five maximum sterile barriers used - cap, mask, sterile gown, sterile gloves, and large sterile sheet  Hand hygiene: hand hygiene performed prior to central venous catheter insertion  Location details: right basilic  Catheter type: double lumen  Catheter size: 5 Fr  Catheter Length: 39cm    Ultrasound guidance: yes  Vessel Caliber: medium and patent, compressibility normal  Vascular Doppler: not done  Needle advanced into vessel with real time Ultrasound guidance.  Guidewire confirmed in vessel.  Image recorded and saved.  Sterile sheath used.  Number of attempts: 1  Post-procedure: blood return through all ports, chlorhexidine patch and sterile dressing applied  Technical procedures used: 3CG  Specimens: No  Implants: No  Assessment: placement verified by x-ray  Complications: none          Name LOIS GUZMAN LPN  2/22/2022    "

## 2022-02-22 NOTE — HOSPITAL COURSE
03/02/2022: CT lumbar spine suspicious for hardware infection. Patient started on vanc and ceftriaxone. Hardware removal and washout with neurosurgery 2/22. Estimated 1L blood loss intraoperatively and required 1 unit PRBCs intraoperatively. Was on levo for Pain control with IV dilaudid and PO oxycodone. Awaiting post op xray imaging.  03/03/2022: 2 u platelets, 2 u prbc. Follow up CBC stable. ID consult. Transfer to floor today.   03/04/2022 2 u platelets by NSGY. Boarding for NSGY.   03/05/2022 SAMRA. Diflucan x 1.

## 2022-02-22 NOTE — PLAN OF CARE
Problem: Occupational Therapy Goal  Goal: Occupational Therapy Goal  Description: Goals to be met by: 3/8/2022     Patient will increase functional independence with ADLs by performing:    Feeding with [place].  UE Dressing with Stand-by Assistance.  LE Dressing with Minimal Assistance.  Grooming while standing at sink with Set-up Assistance.  Toileting from toilet with Stand-by Assistance for hygiene and clothing management.   Toilet transfer to toilet with Contact Guard Assistance.  Pt able to verbalize and adhere to all spinal precautions 100% of the time.  Pt can don/doff TLSO brace stand-by assist      Outcome: Ongoing, Progressing     Evaluation complete-see note for details. Goals and POC established.

## 2022-02-22 NOTE — PROGRESS NOTES
[facility]  Neurocritical Care  Progress Note    Admit Date: 2/14/2022  Service Date: 02/22/2022  Length of Stay: 8    Subjective:     Chief Complaint: Spondylodiscitis    History of Present Illness: Ms. Zazueta is a 42 yo F PMH cirrhosis, Hep C, pancytopenia, polysubstance abuse, epidural abscess s/p L3-L4 laminectomy and L3-L5 TLIF (4/2021; blood and surgical cultures positive for group B strep) who is admitted to the neuro ICU after hardware removal and washout. Per chart review patient had worsening back and left leg pain since October. The left leg pain is a shocking pain if she stands for 10 minutes. Also reports new weakness in the legs. Denies bowel/bladder dsyfunction or neck pain. Uses a walker to ambulate at home. Endorses cigarette and marijuana use denies Iv drug use for 2 years. Patient was on vanc and ceftriaxone prior to the surgery. She required platelets prior to the procedure, PRBCs intraoperatively and estimated blood loss 1L. On arrival to the neuro ICU she was hypotensive requiring levo and in pain.      Hospital Course: CT lumbar spine suspicious for hardware infection. Patient started on vanc and ceftriaxone. Hardware removal and washout with neurosurgery 2/22. Estimated 1L blood loss intraoperatively and required 1 unit PRBCs intraoperatively. Was on levo for Pain control with IV dilaudid and PO oxycodone. Awaiting post op xray imaging      Interval History:  POD 1 hardware removal and washout. Required 1 unit PRBCs overnight. Off levo. Patient continues on vanc and ceftriaxone. Pain control with IV dilaudid and PO oxycodone. Plan for post op xray today.    Review of Systems   Constitutional:  Positive for activity change. Negative for chills and fever.   Respiratory:  Negative for cough and shortness of breath.    Cardiovascular:  Negative for chest pain.   Gastrointestinal:  Negative for abdominal distention and abdominal pain.   Musculoskeletal:  Positive for  arthralgias and back pain.   Skin:  Positive for wound.   Neurological:  Negative for dizziness, light-headedness and headaches.   Psychiatric/Behavioral:  Negative for agitation and behavioral problems.      Objective:     Vitals:  Temp: 98.2 °F (36.8 °C)  Pulse: 103  Rhythm: normal sinus rhythm  BP: 138/70  MAP (mmHg): 95  Resp: 20  SpO2: 98 %  O2 Device (Oxygen Therapy): room air    Temp  Min: 96.4 °F (35.8 °C)  Max: 98.8 °F (37.1 °C)  Pulse  Min: 77  Max: 103  BP  Min: 99/55  Max: 138/70  MAP (mmHg)  Min: 71  Max: 95  Resp  Min: 10  Max: 24  SpO2  Min: 95 %  Max: 100 %    02/21 0701 - 02/22 0700  In: 3756.2 [I.V.:1537.5]  Out: 3365 [Urine:1355; Drains:610]   Unmeasured Output  Urine Occurrence: 1  Stool Occurrence: 0  Pad Count: 1       Physical Exam  Constitutional:       General: She is not in acute distress.     Appearance: She is not ill-appearing or toxic-appearing.   HENT:      Mouth/Throat:      Mouth: Mucous membranes are moist.   Cardiovascular:      Rate and Rhythm: Normal rate and regular rhythm.   Pulmonary:      Effort: No respiratory distress.      Breath sounds: No wheezing or rales.   Abdominal:      General: There is no distension.      Tenderness: There is no abdominal tenderness. There is no guarding or rebound.   Musculoskeletal:      Right lower leg: No edema.      Left lower leg: No edema.   Neurological:      General: No focal deficit present.      Mental Status: She is alert and oriented to person, place, and time.      Motor: Weakness present.         Medications:  Continuous ScheduledcefTRIAXone (ROCEPHIN) IVPB, 2 g, Q24H  heparin (porcine), 5,000 Units, Q8H  pantoprazole, 40 mg, Daily  polyethylene glycol, 17 g, Daily  pregabalin, 50 mg, TID  senna-docusate 8.6-50 mg, 1 tablet, Daily  sertraline, 50 mg, Daily  vancomycin (VANCOCIN) IVPB, 15 mg/kg, Q12H    PRNacetaminophen, 650 mg, Q6H PRN  dextrose 10%, 12.5 g, PRN  dextrose 10%, 25 g, PRN  glucagon (human recombinant), 1 mg,  PRN  glucose, 16 g, PRN  glucose, 24 g, PRN  HYDROmorphone, 1 mg, Q4H PRN  melatonin, 6 mg, Nightly PRN  oxyCODONE, 10 mg, Q4H PRN  vancomycin - pharmacy to dose, , pharmacy to manage frequency      Today I personally reviewed pertinent medications, lines/drains/airways, imaging, laboratory results, notably:    Diet  Diet Adult Regular (IDDSI Level 7)  Diet Adult Regular (IDDSI Level 7)      Assessment/Plan:     Psychiatric  Mild episode of recurrent major depressive disorder  Continue home sertraline 50mg daily    ID  * Spondylodiscitis  Patient initially sent to the hospital from nsgy clinic for infected hardware.  Of note history of IVDU and TLIF of L3-L5 on 04/16/2021 for previous spinal abscess. This hospital admission MRI C/T/L spine showed suspected discitis/osteomyelitis at L3-4 and L4-5 with probable hardware infection. Afebrile, white count 3.31 at time of admission to the ICU.     Continue vancomycin and ceftriaxone  Washout and hardware removal with neurosurgery 2/21  Estimated 1L blood loss intraop, required 1L PRBCs intraoperatively  Monitor drain output  ID following  Restarting heparin 2/22 per nsgy recs  Plan for post op xray 2/22        Oncology  Pancytopenia  Hx of bone marrow biopsy 2017 that was nondiagnostic.      Recommendations:  - daily labs  - transfuse for Hgb <7  - transfuse for plt <10 or <50 with bleeding    GI  Chronic hepatitis C with cirrhosis  U/S abdomen with doppler showed cirrhosis and portal hypertension satisfactory Doppler evaluation   daily CMP and INR  will need hepatology referral at time of discharge  Lipase normal    Other  Hyperbilirubinemia  Tbili 2.1 on admission.      Recommendations:  daily CMP, trend Tbili   Hepatology workup outpatient          The patient is being Prophylaxed for:  Venous Thromboembolism with: Mechanical or Chemical  Stress Ulcer with: PPI  Ventilator Pneumonia with: not applicable    Activity Orders          Discontinue arterial line starting at  02/22 0920    Diet Adult Regular (IDDSI Level 7): Regular starting at 02/21 1837    Progressive Mobility Protocol (mobilize patient to their highest level of functioning at least twice daily) starting at 02/14 2000        Full Code    Mercedez Montiel MD  Neurocritical Care  [facility]

## 2022-02-22 NOTE — PLAN OF CARE
PT Eval complete and POC established.    Estefania Fuller, PT, DPT  2022    Problem: Physical Therapy Goal  Goal: Physical Therapy Goal  Description: Goals to be met by: 3/8/2022     Patient will increase functional independence with mobility by performin. Supine to sit with MInimal Assistance  2. Sit to supine with MInimal Assistance  3. Rolling to Left and Right with Minimal Assistance.  4. Sit to stand transfer with Stand-by Assistance and RW  5. Bed to chair transfer with Minimal Assistance using Rolling Walker    Outcome: Ongoing, Progressing

## 2022-02-22 NOTE — SUBJECTIVE & OBJECTIVE
"Past Medical History:   Diagnosis Date    Anemia     Diskitis     Hep C w/o coma, chronic     IV drug abuse     Liver cirrhosis      Past Surgical History:   Procedure Laterality Date    BACK SURGERY      benine tumor removal      forehead, age 9    CHOLECYSTECTOMY      KIDNEY SURGERY        No current facility-administered medications on file prior to encounter.     Current Outpatient Medications on File Prior to Encounter   Medication Sig Dispense Refill    diclofenac sodium (VOLTAREN) 1 % Gel Apply 2 g topically 4 (four) times daily. 50 g 0    polyethylene glycol (GLYCOLAX) 17 gram/dose powder Take 17 g by mouth once daily. 510 g 0    pregabalin (LYRICA) 50 MG capsule Take 1 capsule (50 mg total) by mouth 3 (three) times daily. 90 capsule 6    sertraline (ZOLOFT) 25 MG tablet Take 1 tablet (25 mg total) by mouth once daily. 30 tablet 0      Allergies: Bee venom protein (honey bee), Wasp sting [allergen ext-venom-honey bee], Adhesive, Strawberry, Iodine and iodide containing products, Naproxen, Shellfish containing products, and Nuts [tree nut]    Family History   Problem Relation Age of Onset    Hepatitis Mother     Drug abuse Mother      Social History     Tobacco Use    Smoking status: Current Some Day Smoker     Packs/day: 0.25     Years: 23.00     Pack years: 5.75     Types: Cigarettes    Smokeless tobacco: Never Used   Substance Use Topics    Alcohol use: Not Currently    Drug use: Yes     Frequency: 4.0 times per week     Types: Methamphetamines, Marijuana     Comment: Cannibis 1/month.  Meth- Injection, 1/w, injection in the right hand and wrist. Denies shared needles with other people, but occasional reuse at times. In addition endorses using a "fresh point" for sites of injection. Last use 3 days prior that was inhal     Review of Systems   Unable to perform ROS: Acuity of condition   Objective:     Vitals:    Temp: 98.2 °F (36.8 °C)  Pulse: 82  BP: 106/71  MAP (mmHg): 73  Resp: 18  SpO2: 99 %    Temp  " Min: 98 °F (36.7 °C)  Max: 98.6 °F (37 °C)  Pulse  Min: 78  Max: 99  BP  Min: 93/51  Max: 119/55  MAP (mmHg)  Min: 71  Max: 83  Resp  Min: 16  Max: 20  SpO2  Min: 94 %  Max: 99 %    02/20 0701 - 02/21 0700  In: -   Out: 300 [Urine:300]   Unmeasured Output  Urine Occurrence: 1  Stool Occurrence: 0  Pad Count: 1       Physical Exam  Constitutional:       General: She is not in acute distress.     Appearance: She is ill-appearing. She is not toxic-appearing.   Cardiovascular:      Rate and Rhythm: Normal rate and regular rhythm.   Pulmonary:      Effort: No respiratory distress.      Breath sounds: No wheezing or rales.   Chest:      Chest wall: No tenderness.   Abdominal:      General: There is no distension.      Tenderness: There is no abdominal tenderness. There is no guarding or rebound.   Neurological:      Mental Status: She is alert and oriented to person, place, and time.      GCS: GCS eye subscore is 4. GCS verbal subscore is 5. GCS motor subscore is 6.      Motor: No weakness.      Comments: 5/5 strength in all extremities       Today I personally reviewed pertinent medications, lines/drains/airways, imaging, laboratory results, notably:

## 2022-02-22 NOTE — ASSESSMENT & PLAN NOTE
U/S abdomen with doppler showed cirrhosis and portal hypertension satisfactory Doppler evaluation   daily CMP and INR  will need hepatology referral on discharge   check lipase given RUQ/epigastric pain

## 2022-02-22 NOTE — SUBJECTIVE & OBJECTIVE
Interval History: NAEON. AFVSS. Exam stable.   No longer needing pressors.      Medications:  Continuous Infusions:   sodium chloride 0.9% Stopped (02/21/22 1629)    NORepinephrine bitartrate-D5W Stopped (02/21/22 2144)     Scheduled Meds:   cefTRIAXone (ROCEPHIN) IVPB  2 g Intravenous Q24H    pantoprazole  40 mg Oral Daily    polyethylene glycol  17 g Oral Daily    pregabalin  50 mg Oral TID    senna-docusate 8.6-50 mg  1 tablet Oral Daily    sertraline  50 mg Oral Daily    vancomycin (VANCOCIN) IVPB  15 mg/kg Intravenous Q12H     PRN Meds:sodium chloride, sodium chloride, sodium chloride, sodium chloride, sodium chloride, sodium chloride, acetaminophen, dextrose 10%, dextrose 10%, glucagon (human recombinant), glucose, glucose, glucose, HYDROmorphone, melatonin, oxyCODONE, Pharmacy to dose Vancomycin consult **AND** vancomycin - pharmacy to dose       Objective:     Weight: 133.8 kg (294 lb 15.6 oz)  Body mass index is 46.2 kg/m².  Vital Signs (Most Recent):  Temp: 98.2 °F (36.8 °C) (02/22/22 0705)  Pulse: 98 (02/22/22 0805)  Resp: 17 (02/22/22 0805)  BP: 129/71 (02/22/22 0805)  SpO2: 98 % (02/22/22 0805) Vital Signs (24h Range):  Temp:  [96.4 °F (35.8 °C)-98.8 °F (37.1 °C)] 98.2 °F (36.8 °C)  Pulse:  [77-99] 98  Resp:  [10-24] 17  SpO2:  [95 %-100 %] 98 %  BP: ()/(49-71) 129/71  Arterial Line BP: (108-140)/(59-88) 140/88     Date 02/22/22 0700 - 02/23/22 0659   Shift 8470-3656 6881-3424 9823-4281 24 Hour Total   INTAKE   Blood 115   115   IV Piggyback 100   100   Shift Total(mL/kg) 215(1.6)   215(1.6)   OUTPUT   Shift Total(mL/kg)       Weight (kg) 133.8 133.8 133.8 133.8                     Female External Urinary Catheter 02/14/22 2300 (Active)   Output (mL) 650 mL 02/15/22 0645       Physical Exam  General: well developed, well nourished, no distress.   Head: normocephalic, atraumatic  Neck: No tracheal deviation.   Neurologic: Alert and oriented. Thought content appropriate.  GCS: Motor: 6/Verbal:  5/Eyes: 4 GCS Total: 15  Mental Status: Awake, Alert, Oriented x 4  Language: No aphasia   Speech: No dysarthria  Cranial nerves: face symmetric, tongue midline, CN II-XII grossly intact, EOMI.   Pulmonary: normal respirations, no signs of respiratory distress  Abdomen: soft, non-distended, not tender to palpation  Sensory: intact to light touch throughout  Motor Strength: Moves all extremities spontaneously with good tone.  Pain limited weakness to proximal LLE. No abnormal movements seen.     Strength  Deltoids Triceps Biceps Wrist Extension Wrist Flexion Hand    Upper: R 5/5 5/5 5/5 5/5 5/5 5/5    L 5/5 5/5 5/5 5/5 5/5 5/5     Iliopsoas Quadriceps Knee  Flexion Tibialis  anterior Gastro- cnemius EHL   Lower: R 5/5 5/5 5/5 5/5 5/5 5/5    L 4+/5 4+/5 4+/5 5/5 5/5 5/5     Skin: Skin is warm, dry and intact.      Significant Labs:  Recent Labs   Lab 02/21/22 0444 02/21/22 1817 02/22/22  0342   GLU 91 148* 110    137 132*   K 4.1 4.8 4.5    107 104   CO2 27 22* 22*   BUN 10 13 15   CREATININE 0.6 0.6 0.5   CALCIUM 8.7 7.7* 8.3*       Recent Labs   Lab 02/21/22 0444 02/21/22 1817 02/22/22  0342   WBC 3.31* 7.49 9.30   HGB 11.0* 9.0* 9.3*   HCT 35.4* 28.8* 29.3*   * 229 165       Recent Labs   Lab 02/21/22 0444 02/22/22  0342   INR 1.3* 1.3*       Microbiology Results (last 7 days)       Procedure Component Value Units Date/Time    Culture, Anaerobic [[id number]] Collected: 02/15/22 1128    Order Status: Completed Specimen: Biopsy from Back Updated: 02/22/22 0656     Anaerobic Culture No anaerobes isolated    Narrative:      L3-4 / L4-5 osteodiscitis    Culture, Anaerobe [[id number]] Collected: 02/21/22 1539    Order Status: Completed Specimen: Abscess from Back Updated: 02/22/22 0648     Anaerobic Culture Culture in progress    Narrative:      Epidural purulence #1    Culture, Anaerobe [[id number]] Collected: 02/21/22 1539    Order Status: Completed Specimen: Abscess from Back Updated: 02/22/22  0648     Anaerobic Culture Culture in progress    Narrative:      Epidural purulence #2    Aerobic culture [[id number]] Collected: 02/21/22 1539    Order Status: Completed Specimen: Abscess from Back Updated: 02/22/22 0632     Aerobic Bacterial Culture No growth    Narrative:      Epidural purulence #2    Gram stain [[id number]] Collected: 02/21/22 1539    Order Status: Completed Specimen: Abscess from Back Updated: 02/21/22 1753     Gram Stain Result No organisms seen      Few WBC's    Narrative:      Epidural purulence #2    Gram stain [[id number]] Collected: 02/21/22 1539    Order Status: Completed Specimen: Abscess from Back Updated: 02/21/22 1751     Gram Stain Result No organisms seen      Few WBC's    Narrative:      Epidural purulence #1    Aerobic culture [[id number]] Collected: 02/21/22 1539    Order Status: Sent Specimen: Abscess from Back Updated: 02/21/22 1659    AFB Culture & Smear [[id number]] Collected: 02/21/22 1539    Order Status: Sent Specimen: Abscess from Back Updated: 02/21/22 1658    Fungus culture [[id number]] Collected: 02/21/22 1539    Order Status: Sent Specimen: Abscess from Back Updated: 02/21/22 1657    Fungus culture [[id number]] Collected: 02/21/22 1539    Order Status: Sent Specimen: Abscess from Back Updated: 02/21/22 1656    AFB Culture & Smear [[id number]] Collected: 02/21/22 1539    Order Status: Sent Specimen: Abscess from Back Updated: 02/21/22 1655    Fungus culture [[id number]] Collected: 02/15/22 1128    Order Status: Completed Specimen: Biopsy from Back Updated: 02/21/22 1150     Fungus (Mycology) Culture Culture in progress    Narrative:      L3-4 / L4-5 osteodiscitis    Blood Culture #2 **CANNOT BE ORDERED STAT** [[id number]] Collected: 02/14/22 1820    Order Status: Completed Specimen: Blood from Peripheral, Antecubital, Right Updated: 02/19/22 2012     Blood Culture, Routine No growth after 5 days.    Blood Culture #1 **CANNOT BE ORDERED STAT** [[id number]] Collected: 02/14/22  1820    Order Status: Completed Specimen: Blood from Peripheral, Forearm, Right Updated: 02/19/22 2012     Blood Culture, Routine No growth after 5 days.    Aerobic culture [[id number]] Collected: 02/15/22 1128    Order Status: Completed Specimen: Biopsy from Back Updated: 02/19/22 1030     Aerobic Bacterial Culture No growth    Narrative:      L3-4 / L4-5 osteodiscitis    AFB Culture & Smear [[id number]] Collected: 02/15/22 1128    Order Status: Completed Specimen: Biopsy from Back Updated: 02/16/22 2127     AFB Culture & Smear Culture in progress     AFB CULTURE STAIN No acid fast bacilli seen.    Narrative:      L3-4 / L4-5 osteodiscitis    Gram stain [[id number]] Collected: 02/15/22 1128    Order Status: Completed Specimen: Biopsy from Back Updated: 02/15/22 1815     Gram Stain Result Rare WBC's      No organisms seen    Narrative:      L3-4 / L4-5 osteodiscitis          Recent Lab Results         02/22/22  0342   02/21/22  1817   02/21/22  1539        Aerobic Bacterial Culture     No growth  [P]       Albumin 2.2   2.1         Alkaline Phosphatase 97   92         ALT 19   21         Anaerobic Culture     Culture in progress  [P]            Culture in progress  [P]       Anion Gap 6   8         Aniso   Slight         AST 48   66  Comment: *Result may be interfered by visible hemolysis         Baso # 0.02   0.03         Basophil % 0.2   0.4         BILIRUBIN TOTAL 2.7  Comment: For infants and newborns, interpretation of results should be based  on gestational age, weight and in agreement with clinical  observations.    Premature Infant recommended reference ranges:  Up to 24 hours.............<8.0 mg/dL  Up to 48 hours............<12.0 mg/dL  3-5 days..................<15.0 mg/dL  6-29 days.................<15.0 mg/dL     1.5  Comment: For infants and newborns, interpretation of results should be based  on gestational age, weight and in agreement with clinical  observations.    Premature Infant recommended  reference ranges:  Up to 24 hours.............<8.0 mg/dL  Up to 48 hours............<12.0 mg/dL  3-5 days..................<15.0 mg/dL  6-29 days.................<15.0 mg/dL           BUN 15   13         Calcium 8.3   7.7         Chloride 104   107         CO2 22   22         Creatinine 0.5   0.6         Differential Method Automated   Automated         eGFR if  >60.0   >60.0         eGFR if non  >60.0  Comment: Calculation used to obtain the estimated glomerular filtration  rate (eGFR) is the CKD-EPI equation.      >60.0  Comment: Calculation used to obtain the estimated glomerular filtration  rate (eGFR) is the CKD-EPI equation.            Eos # 0.0   0.0         Eosinophil % 0.0   0.3         Glucose 110   148         Gram Stain Result     No organisms seen            Few WBC's            No organisms seen            Few WBC's       Gran # (ANC) 8.3   6.6         Gran % 89.0   87.8         Hematocrit 29.3   28.8         Hemoglobin 9.3   9.0         Hypo   Occasional         Immature Grans (Abs) 0.05  Comment: Mild elevation in immature granulocytes is non specific and   can be seen in a variety of conditions including stress response,   acute inflammation, trauma and pregnancy. Correlation with other   laboratory and clinical findings is essential.     0.09  Comment: Mild elevation in immature granulocytes is non specific and   can be seen in a variety of conditions including stress response,   acute inflammation, trauma and pregnancy. Correlation with other   laboratory and clinical findings is essential.           Immature Granulocytes 0.5   1.2         INR 1.3  Comment: Coumadin Therapy:  2.0 - 3.0 for INR for all indicators except mechanical heart valves  and antiphospholipid syndromes which should use 2.5 - 3.5.             Lymph # 0.6   0.7         Lymph % 6.1   9.1         MCH 28.3   27.9         MCHC 31.7   31.3         MCV 89   89         Mono # 0.4   0.1         Mono %  4.2   1.2         MPV 9.5   10.0         nRBC 0   0         Ovalocytes   Occasional         Platelet Estimate   Appears normal         Platelets 165   229         Poik   Slight         Poly   Occasional         Potassium 4.5   4.8         PROTEIN TOTAL 6.8   6.9         Protime 13.7           RBC 3.29   3.23         RDW 17.2   17.9         Sodium 132   137         WBC 9.30   7.49                  [P] - Preliminary Result             All pertinent labs from the last 24 hours have been reviewed.    Significant Diagnostics:  I have reviewed all pertinent imaging results/findings within the past 24 hours.  No results found in the last 24 hours.      Review of Systems  Neurosurgery Physical Exam

## 2022-02-22 NOTE — ASSESSMENT & PLAN NOTE
Tbili 2.1 on admission.      Recommendations:  daily CMP, trend Tbili   Hepatology workup outpatient

## 2022-02-23 LAB
ALBUMIN SERPL BCP-MCNC: 2.1 G/DL (ref 3.5–5.2)
ALP SERPL-CCNC: 83 U/L (ref 55–135)
ALT SERPL W/O P-5'-P-CCNC: 20 U/L (ref 10–44)
ANION GAP SERPL CALC-SCNC: 5 MMOL/L (ref 8–16)
AST SERPL-CCNC: 49 U/L (ref 10–40)
BASOPHILS # BLD AUTO: 0.05 K/UL (ref 0–0.2)
BASOPHILS NFR BLD: 0.6 % (ref 0–1.9)
BILIRUB SERPL-MCNC: 1.5 MG/DL (ref 0.1–1)
BUN SERPL-MCNC: 13 MG/DL (ref 6–20)
CALCIUM SERPL-MCNC: 7.9 MG/DL (ref 8.7–10.5)
CHLORIDE SERPL-SCNC: 103 MMOL/L (ref 95–110)
CO2 SERPL-SCNC: 28 MMOL/L (ref 23–29)
CREAT SERPL-MCNC: 0.6 MG/DL (ref 0.5–1.4)
DIFFERENTIAL METHOD: ABNORMAL
EOSINOPHIL # BLD AUTO: 0.1 K/UL (ref 0–0.5)
EOSINOPHIL NFR BLD: 1.2 % (ref 0–8)
ERYTHROCYTE [DISTWIDTH] IN BLOOD BY AUTOMATED COUNT: 18 % (ref 11.5–14.5)
EST. GFR  (AFRICAN AMERICAN): >60 ML/MIN/1.73 M^2
EST. GFR  (NON AFRICAN AMERICAN): >60 ML/MIN/1.73 M^2
GLUCOSE SERPL-MCNC: 111 MG/DL (ref 70–110)
HCT VFR BLD AUTO: 25.4 % (ref 37–48.5)
HGB BLD-MCNC: 8 G/DL (ref 12–16)
IMM GRANULOCYTES # BLD AUTO: 0.1 K/UL (ref 0–0.04)
IMM GRANULOCYTES NFR BLD AUTO: 1.2 % (ref 0–0.5)
INR PPP: 1.3 (ref 0.8–1.2)
LYMPHOCYTES # BLD AUTO: 1.4 K/UL (ref 1–4.8)
LYMPHOCYTES NFR BLD: 15.7 % (ref 18–48)
MCH RBC QN AUTO: 28.4 PG (ref 27–31)
MCHC RBC AUTO-ENTMCNC: 31.5 G/DL (ref 32–36)
MCV RBC AUTO: 90 FL (ref 82–98)
MONOCYTES # BLD AUTO: 0.9 K/UL (ref 0.3–1)
MONOCYTES NFR BLD: 9.8 % (ref 4–15)
NEUTROPHILS # BLD AUTO: 6.2 K/UL (ref 1.8–7.7)
NEUTROPHILS NFR BLD: 71.5 % (ref 38–73)
NRBC BLD-RTO: 0 /100 WBC
PLATELET # BLD AUTO: 133 K/UL (ref 150–450)
PMV BLD AUTO: 9.3 FL (ref 9.2–12.9)
POTASSIUM SERPL-SCNC: 3.8 MMOL/L (ref 3.5–5.1)
PROT SERPL-MCNC: 6.3 G/DL (ref 6–8.4)
PROTHROMBIN TIME: 13.5 SEC (ref 9–12.5)
RBC # BLD AUTO: 2.82 M/UL (ref 4–5.4)
SODIUM SERPL-SCNC: 136 MMOL/L (ref 136–145)
WBC # BLD AUTO: 8.67 K/UL (ref 3.9–12.7)

## 2022-02-23 PROCEDURE — 63600175 PHARM REV CODE 636 W HCPCS: Performed by: PHYSICIAN ASSISTANT

## 2022-02-23 PROCEDURE — 25000003 PHARM REV CODE 250: Performed by: NEUROLOGICAL SURGERY

## 2022-02-23 PROCEDURE — 11000001 HC ACUTE MED/SURG PRIVATE ROOM

## 2022-02-23 PROCEDURE — 99233 PR SUBSEQUENT HOSPITAL CARE,LEVL III: ICD-10-PCS | Mod: ,,, | Performed by: HOSPITALIST

## 2022-02-23 PROCEDURE — 85025 COMPLETE CBC W/AUTO DIFF WBC: CPT | Performed by: STUDENT IN AN ORGANIZED HEALTH CARE EDUCATION/TRAINING PROGRAM

## 2022-02-23 PROCEDURE — 80053 COMPREHEN METABOLIC PANEL: CPT

## 2022-02-23 PROCEDURE — 25000003 PHARM REV CODE 250

## 2022-02-23 PROCEDURE — 99233 SBSQ HOSP IP/OBS HIGH 50: CPT | Mod: ,,, | Performed by: HOSPITALIST

## 2022-02-23 PROCEDURE — 93010 ELECTROCARDIOGRAM REPORT: CPT | Mod: ,,, | Performed by: INTERNAL MEDICINE

## 2022-02-23 PROCEDURE — A4216 STERILE WATER/SALINE, 10 ML: HCPCS | Performed by: NEUROLOGICAL SURGERY

## 2022-02-23 PROCEDURE — 85610 PROTHROMBIN TIME: CPT

## 2022-02-23 PROCEDURE — 93005 ELECTROCARDIOGRAM TRACING: CPT

## 2022-02-23 PROCEDURE — 25000003 PHARM REV CODE 250: Performed by: STUDENT IN AN ORGANIZED HEALTH CARE EDUCATION/TRAINING PROGRAM

## 2022-02-23 PROCEDURE — 63600175 PHARM REV CODE 636 W HCPCS: Performed by: STUDENT IN AN ORGANIZED HEALTH CARE EDUCATION/TRAINING PROGRAM

## 2022-02-23 PROCEDURE — 99233 SBSQ HOSP IP/OBS HIGH 50: CPT | Mod: ,,, | Performed by: PHYSICIAN ASSISTANT

## 2022-02-23 PROCEDURE — 36415 COLL VENOUS BLD VENIPUNCTURE: CPT

## 2022-02-23 PROCEDURE — 99024 PR POST-OP FOLLOW-UP VISIT: ICD-10-PCS | Mod: ,,, | Performed by: PHYSICIAN ASSISTANT

## 2022-02-23 PROCEDURE — 99233 PR SUBSEQUENT HOSPITAL CARE,LEVL III: ICD-10-PCS | Mod: ,,, | Performed by: PHYSICIAN ASSISTANT

## 2022-02-23 PROCEDURE — 99024 POSTOP FOLLOW-UP VISIT: CPT | Mod: ,,, | Performed by: PHYSICIAN ASSISTANT

## 2022-02-23 PROCEDURE — 93010 EKG 12-LEAD: ICD-10-PCS | Mod: ,,, | Performed by: INTERNAL MEDICINE

## 2022-02-23 PROCEDURE — 36568 INSJ PICC <5 YR W/O IMAGING: CPT

## 2022-02-23 PROCEDURE — 25000003 PHARM REV CODE 250: Performed by: PHYSICIAN ASSISTANT

## 2022-02-23 RX ORDER — MAG HYDROX/ALUMINUM HYD/SIMETH 200-200-20
30 SUSPENSION, ORAL (FINAL DOSE FORM) ORAL EVERY 6 HOURS PRN
Status: DISCONTINUED | OUTPATIENT
Start: 2022-02-23 | End: 2022-03-15 | Stop reason: HOSPADM

## 2022-02-23 RX ORDER — LACTULOSE 10 G/15ML
30 SOLUTION ORAL ONCE
Status: COMPLETED | OUTPATIENT
Start: 2022-02-23 | End: 2022-02-23

## 2022-02-23 RX ORDER — OXYCODONE HYDROCHLORIDE 5 MG/1
5 TABLET ORAL EVERY 4 HOURS PRN
Status: DISCONTINUED | OUTPATIENT
Start: 2022-02-23 | End: 2022-02-23

## 2022-02-23 RX ORDER — ACETAMINOPHEN 500 MG
1000 TABLET ORAL EVERY 8 HOURS
Status: DISCONTINUED | OUTPATIENT
Start: 2022-02-23 | End: 2022-03-03

## 2022-02-23 RX ORDER — HYDROMORPHONE HYDROCHLORIDE 1 MG/ML
1 INJECTION, SOLUTION INTRAMUSCULAR; INTRAVENOUS; SUBCUTANEOUS EVERY 4 HOURS PRN
Status: DISCONTINUED | OUTPATIENT
Start: 2022-02-23 | End: 2022-03-03

## 2022-02-23 RX ADMIN — HYDROMORPHONE HYDROCHLORIDE 1 MG: 1 INJECTION, SOLUTION INTRAMUSCULAR; INTRAVENOUS; SUBCUTANEOUS at 07:02

## 2022-02-23 RX ADMIN — HEPARIN SODIUM 5000 UNITS: 5000 INJECTION INTRAVENOUS; SUBCUTANEOUS at 02:02

## 2022-02-23 RX ADMIN — HEPARIN SODIUM 5000 UNITS: 5000 INJECTION INTRAVENOUS; SUBCUTANEOUS at 11:02

## 2022-02-23 RX ADMIN — PREGABALIN 50 MG: 50 CAPSULE ORAL at 02:02

## 2022-02-23 RX ADMIN — SERTRALINE HYDROCHLORIDE 50 MG: 50 TABLET ORAL at 09:02

## 2022-02-23 RX ADMIN — POLYETHYLENE GLYCOL 3350 17 G: 17 POWDER, FOR SOLUTION ORAL at 09:02

## 2022-02-23 RX ADMIN — Medication 10 ML: at 11:02

## 2022-02-23 RX ADMIN — SENNOSIDES AND DOCUSATE SODIUM 1 TABLET: 50; 8.6 TABLET ORAL at 09:02

## 2022-02-23 RX ADMIN — Medication 10 ML: at 12:02

## 2022-02-23 RX ADMIN — OXYCODONE HYDROCHLORIDE 10 MG: 10 TABLET ORAL at 06:02

## 2022-02-23 RX ADMIN — LACTULOSE 30 G: 20 SOLUTION ORAL at 02:02

## 2022-02-23 RX ADMIN — ACETAMINOPHEN 1000 MG: 500 TABLET ORAL at 02:02

## 2022-02-23 RX ADMIN — OXYCODONE HYDROCHLORIDE 15 MG: 10 TABLET ORAL at 05:02

## 2022-02-23 RX ADMIN — HEPARIN SODIUM 5000 UNITS: 5000 INJECTION INTRAVENOUS; SUBCUTANEOUS at 06:02

## 2022-02-23 RX ADMIN — HYDROMORPHONE HYDROCHLORIDE 1 MG: 1 INJECTION, SOLUTION INTRAMUSCULAR; INTRAVENOUS; SUBCUTANEOUS at 09:02

## 2022-02-23 RX ADMIN — ACETAMINOPHEN 1000 MG: 500 TABLET ORAL at 11:02

## 2022-02-23 RX ADMIN — PANTOPRAZOLE SODIUM 40 MG: 20 TABLET, DELAYED RELEASE ORAL at 09:02

## 2022-02-23 RX ADMIN — Medication 10 ML: at 06:02

## 2022-02-23 RX ADMIN — PREGABALIN 50 MG: 50 CAPSULE ORAL at 09:02

## 2022-02-23 RX ADMIN — PREGABALIN 50 MG: 50 CAPSULE ORAL at 08:02

## 2022-02-23 RX ADMIN — CEFTRIAXONE 2 G: 2 INJECTION, POWDER, FOR SOLUTION INTRAMUSCULAR; INTRAVENOUS at 02:02

## 2022-02-23 NOTE — PROGRESS NOTES
[facility])  Infectious Disease  Progress Note    Patient Name: Rachel Zazueta  MRN: [id number]  Admission Date: 2/14/2022  Length of Stay: 9 days  Attending Physician: Guille Templeton MD  Primary Care Provider: Dannielle Mreino DO    Isolation Status: No active isolations  Assessment/Plan:      * Spondylodiscitis  41 year old female with remote hx of IVDU, GBS bacteremia, L3-4 osteodiscitis & epidural abscess s/p washout and fusion 4/2021 who presented to NSGY clinic with worsening back pain since October and admitted after imaging showed worsened L3-4 osteodiscitis with extension to L4-5 along with hardware loosening.    Patient underwent L3-4 intervertebral disc biopsy on 2/15. She is s/p hardware removal on 2/21 though cages retained L3-4 and L4-5.  Patient was on empiric Vancomycin and Ceftriaxone - now rocephin alone. IR/OR cultures and blood cx are NGTD. Afebrile. HDS.  On high dose ceftriaxone given cultures from epidural space. Stable non septic    Recommendations  · Continue empiric Ceftriaxone 2g IV q12h  · Fu cultures  · Redo fusion on 3/2/22   · Anticipate 8 weeks of IV antibiotics from day of surgery  · Follow x 1 more day then sign off till next surgery then reconsult on 3/2  · Discussed antibiotic plan with ID staff. ID will follow    Chronic hepatitis C with cirrhosis     History of HCV. Needs Hepatology follow up after discharge for further evaluation and treatment.        Anticipated Disposition: tbd    Thank you for your consult. I will follow-up with patient. Please contact us if you have any additional questions.    ROSA ELENA Marinelli  Infectious Disease  [facility])    Subjective:     Principal Problem:Spondylodiscitis    HPI: Rachel Zazueta is a 41 year old female with HCV, liver, cirrhosis, anemia, and polysubstance abuse admitted in April 2021 with GBS bacteremia and L3-4 spondylodiscitis with associated epidural abscess s/p posterior  spinal fusion and evacuation of epidural abscess on 4/16.  She was treated with Ceftriaxone with a plan to complete 8 weeks of antibiotics (end date 6/11/21). Initially at [facility] and  discharged from Rehab with a PICC line to finish antibiotics at home. Patient reports only completing 4-5 weeks of IV antibiotics as her PICC line accidentally fell at at home. She did not inform ID or go to the ED for replacement as her back pain had resolved and she felt well. She missed her last ID appt.    In October her back pain returned and radiates down her left leg. She was seen in NSGY 2/14 for this.  CT ordered reviewed and showed increased destructive endplate changes at L3-4, new destructive endplate changes at L4-5, and worsening alignment abnormalities, with adjacent soft tissue edema and lymphadenopathy, highly concerning for discitis osteomyelitis. New lucencies at the bone-metal interfaces of all pedicle screws and both interbody spacers, concerning for loosening. Patient was admitted for further evaluation and treatment.    Patient afebrile without a leukocytosis. Sed rate 57/CRP 29. She stated that she is no longer using IV drugs and only using marijuana.  Denies recent abx use.    MRI C/T/L spine - Suspected discitis/osteomyelitis of the lumbar spine at L3-4 and L4-5 with postoperative changes of posterior fusion from L3 through L5 with probable hardware infection.  IR consulted and performed L3-4 intervertebral disc biopsy. Cx are NGTD.  ID consulted to abx recs       Interval History:   No AEON.  Afebrile and WBC WNL.  S/P Hardware removal  Per op note, purulence seen.  On floor now - co back pain  IR and OR Cx NGTD  The patient denies any recent fever, chills, or sweats.      Review of Systems   Constitutional:  Negative for activity change, chills, diaphoresis and fever.   Respiratory:  Negative for cough, shortness of breath and wheezing.    Cardiovascular:  Negative for chest pain.   Gastrointestinal:   Negative for abdominal pain, constipation, diarrhea, nausea and vomiting.   Genitourinary:  Negative for dysuria, frequency and urgency.   Musculoskeletal:  Positive for back pain.   Skin:  Positive for wound.   Neurological:  Negative for dizziness.   Hematological:  Does not bruise/bleed easily.   Objective:     Vital Signs (Most Recent):  Temp: 98.2 °F (36.8 °C) (02/22/22 0705)  Pulse: 98 (02/22/22 0805)  Resp: 20 (02/22/22 1006)  BP: 129/71 (02/22/22 0805)  SpO2: 98 % (02/22/22 0805) Vital Signs (24h Range):  Temp:  [96.4 °F (35.8 °C)-98.8 °F (37.1 °C)] 98.2 °F (36.8 °C)  Pulse:  [77-99] 98  Resp:  [10-24] 20  SpO2:  [95 %-100 %] 98 %  BP: ()/(53-71) 129/71  Arterial Line BP: (108-140)/(59-88) 140/88     Weight: 133.8 kg (294 lb 15.6 oz)  Body mass index is 46.2 kg/m².    Estimated Creatinine Clearance: 211.5 mL/min (based on SCr of 0.5 mg/dL).    Physical Exam  Constitutional:       General: She is not in acute distress.     Appearance: She is well-developed. She is not diaphoretic.   HENT:      Head: Normocephalic and atraumatic.   Cardiovascular:      Rate and Rhythm: Normal rate and regular rhythm.      Heart sounds: Normal heart sounds. No murmur heard.    No friction rub. No gallop.   Pulmonary:      Effort: Pulmonary effort is normal. No respiratory distress.      Breath sounds: Normal breath sounds. No wheezing or rales.      Comments: Anterior exam as patient with back pain  Abdominal:      General: Bowel sounds are normal. There is no distension.      Palpations: Abdomen is soft. There is no mass.      Tenderness: There is no abdominal tenderness. There is no guarding or rebound.   Skin:     General: Skin is warm and dry.   Neurological:      Mental Status: She is alert and oriented to person, place, and time.   Psychiatric:         Behavior: Behavior normal.       Significant Labs: Blood Culture:   Recent Labs   Lab 02/14/22  1820   LABBLOO No growth after 5 days.  No growth after 5 days.      CBC:   Recent Labs   Lab 02/21/22  0444 02/21/22  1613 02/21/22  1817 02/22/22  0342   WBC 3.31*  --  7.49 9.30   HGB 11.0*  --  9.0* 9.3*   HCT 35.4* 30* 28.8* 29.3*   *  --  229 165     CMP:   Recent Labs   Lab 02/21/22  0444 02/21/22  1817 02/22/22  0342    137 132*   K 4.1 4.8 4.5    107 104   CO2 27 22* 22*   GLU 91 148* 110   BUN 10 13 15   CREATININE 0.6 0.6 0.5   CALCIUM 8.7 7.7* 8.3*   PROT 8.0 6.9 6.8   ALBUMIN 2.3* 2.1* 2.2*   BILITOT 1.6* 1.5* 2.7*   ALKPHOS 117 92 97   AST 50* 66* 48*   ALT 19 21 19   ANIONGAP 4* 8 6*   EGFRNONAA >60.0 >60.0 >60.0     Wound Culture:   Recent Labs   Lab 02/15/22  1128 02/21/22  1539   LABAERO No growth No growth     All pertinent labs within the past 24 hours have been reviewed.    Significant Imaging: I have reviewed all pertinent imaging results/findings within the past 24 hours.  X-Ray Chest 1 View S/P PICC Line by Nursing [[id number]] Resulted: 02/22/22 1403   Order Status: Completed Updated: 02/22/22 1405   Narrative:     EXAMINATION:   XR CHEST 1 VIEW S/P PICC LINE BY NURSING     CLINICAL HISTORY:   PICC PLACEMENT;     TECHNIQUE:   Frontal chest radiograph     COMPARISON:   Chest radiograph 05/02/2021     FINDINGS:   Monitoring leads overlie the chest.  Patient is rotated.     Right-sided PICC line tip appears to extend at least to the mid to distal SVC level, noting the distal aspect is somewhat obscured by overlying monitoring leads.  Cardiomediastinal silhouette is midline and within normal limits.  The lungs are well expanded and grossly clear without pneumothorax.  Otherwise grossly stable chest.    Impression:       As above.       Electronically signed by: Ammon Diane MD   Date: 02/22/2022   Time: 14:03   X-Ray Lumbar Spine Ap And Lateral [[id number]] Resulted: 02/22/22 1241   Order Status: Completed Updated: 02/22/22 1244   Narrative:     EXAMINATION:   XR LUMBAR SPINE AP AND LATERAL     CLINICAL HISTORY:   To be done standing while  TLSO is on.;     FINDINGS:   There is a dextroconvex curvature of the lumbar spine.  There are disc implants at L3-L4 and L4-L5.  The pedicle screws and fixation rods a been removed.  There are antibiotic beads packed along the posterior elements.  There is severe flattening and destruction of L4.  There is DJD.    Impression:       Postsurgical changes as above.       Electronically signed by: Toi Beyer MD   Date: 02/22/2022   Time: 12:41     SURG FL Surgery Fluoro Usage [[id number]] Resulted: 02/21/22 1642   Order Status: Completed Updated: 02/21/22 1642   Narrative:     See OP Notes for results.     IMPRESSION: See OP Notes for results.             This procedure was auto-finalized by: Virtual Radiologist   X-Ray Scoliosis Complete [[id number]] Resulted: 02/20/22 1036   Order Status: Completed Updated: 02/20/22 1039   Narrative:     EXAMINATION:   XR SCOLIOSIS COMPLETE     CLINICAL HISTORY:   pre-op planning;     TECHNIQUE:   AP and lateral views of the entire spine     COMPARISON:   02/15/2022     FINDINGS:   there is 12 degrees of convex right scoliosis as measured from the superior endplate of T2 to the inferior endplate of T6. There is 25 degrees of convex right scoliosis as measured from the superior endplate of L1 to the inferior endplate of L5.  Postsurgical changes are again seen of a posterior L3 through L5 fusion with interbody spacer placements.  The loosening described on prior imaging is not as well seen on this exam.  There appears to be further destruction of the L4 vertebral body when compared to 02/15/2022 on the lateral view that is not as well seen on the frontal view.  The frontal view however shows destructive endplate changes at L3-L4 and L4-L5 appear unchanged from 02/15/2022.     The remainder of the vertebral body heights are maintained.  Degenerative changes, moderate, are seen at C3-C4, C4-C5 and C5-C6.  Soft tissues are unremarkable.    Impression:       Destructive endplate changes  at L3-L4 and L4-L5 that are concerning for discitis osteomyelitis. On the lateral view, there appears to be further destruction of the L4 vertebral body when compared to 02/15/2022 that is not borne out on the AP view.  Cross-sectional imaging could be considered for further evaluation.       Electronically signed by: Hoa Barclay   Date: 02/20/2022   Time: 10:36   X-Ray Lumbar Complete Including Flex And Ext [[id number]] Resulted: 02/16/22 0752   Order Status: Completed Updated: 02/16/22 0754   Narrative:     EXAMINATION:   XR LUMBAR SPINE 5 VIEW WITH FLEX AND EXT     CLINICAL HISTORY:   evaluate spinal instability; L3-4, L4-5 TLIF with hardware failure in setting of spondylodiscitis;     TECHNIQUE:   Five views of the lumbar spine plus flexion extension views were performed.     COMPARISON:   Lumbar spine exam December 31, 2021, CT of lumbar spine February 14, 2022     FINDINGS:   Stable 35 degree or so dextro rotary scoliosis of lower thoracic and lumbar spine.  No acute fractures with preserved vertebral body heights.  In reconfirmed findings of bilateral posterior hardware instrumented fusion in of L3, L4, and L5 vertebral segments with inter body spacers.  As was better seen on earlier CT, there is paralleling lucency  at the bone metal interfaces of all pedicle screws of concern for hardware loosening.  No fragmented hardware.  The CT demonstrated increased destructive endplate changes at L3-L4 and new destructive endplate changes at L4-L5 concerning for discitis-osteomyelitis reconfirmed on the standard images but better defined on CT.  Grade 1 anterolisthesis of L4 with respect to L3 and L5.  No definite instability based on flexion and extension views.  Flexion and extension views demonstrate no definite instability.    Impression:       As above.       Electronically signed by: Brandan Aguilar   Date: 02/16/2022   Time: 07:52   US Abdomen Limited with Doppler (xpd) [[id number]] Resulted: 02/15/22 0088    Order Status: Completed Updated: 02/15/22 1902   Narrative:     EXAMINATION:   US ABDOMEN LIMITED WITH DOPPLER (XPD)     CLINICAL HISTORY:   cirrhosis;     TECHNIQUE:   Complete abdominal ultrasound with doppler.  Color and spectral Doppler were performed.     COMPARISON:   CT abdomen pelvis 07/27/2021.     FINDINGS:   The visualized portion of the pancreas demonstrates 1.6 x 0.7 x 1.1 cm peripancreatic lymph node.     The liver is normal in size measuring 13 cm.  The liver demonstrates heterogeneous echotexture no focal hepatic lesions are seen.     The gallbladder is surgically absent.  The common duct is not dilated, measuring 3 mm.  No dilated intrahepatic radicles are seen.     The spleen is enlarged in size measuring 20.6 x 10.7 cm with a homogeneous echotexture.     The aorta tapers normally.     The kidneys are normal in size without focal abnormality or evidence of hydronephrosis.     There is no evidence of ascites.     The main portal vein, right portal vein, left portal vein, middle hepatic vein, right hepatic vein, left hepatic vein, SMV, and IVC are patent with proper directional flow.  The main hepatic artery is patent. The umbilical vein is patent.    Impression:       Hepatic cirrhosis and portal hypertension with satisfactory Doppler evaluation of the liver.     Enlarged peripancreatic lymph node.     Electronically signed by resident: Nuha Grubbs   Date: 02/15/2022   Time: 18:27     Electronically signed by: Yovany Beatty MD   Date: 02/15/2022   Time: 18:59   IR Biopsy Bone deep [[id number]] Resulted: 02/15/22 1322   Order Status: Completed Updated: 02/15/22 1325   Narrative:     EXAMINATION:   IR BIOPSY BONE/DISC DEEP     CLINICAL HISTORY:   L3-4 / L4-5 osteodiscitis;     41 y.o. female with medical history of Hepatitis C, liver cirrhosis, polysubstance abuse, L3 and L4 laminectomy - with admission concerns for back pain. MRI spine revealing discitis/osteomyelitis of the lumbar spine at L3-4  and L4-5. Hence plans for fluroscopy guided L3-4 intervertebral disc biopsy.     TECHNIQUE:   Informed consent was obtained.  Biplane fluoroscopy was used.  Conscious intravenous sedation was provided by an independent radiology nurse who used continuous physiologic monitoring, and the time of the sedation was 20 minutes.     Using usual sterile technique, lidocaine local anesthesia and biplane fluoroscopy guidance, a biopsy procedure was performed via right posterolateral, para pedicular approach.  [employer] VT Bone Biopsy Device 13Ga was used to obtain samples from the L3-L4 intervertebral disc and L3 inferior endplate.     Samples were sent to the lab.  There were no complications.  The patient tolerated the procedure well and was transferred to the recovery room after the biopsy procedure.  Images and report were permanently recorded.  Fluoroscopy dose was 2634.2 uGycm2.  Reference air kerma was 228.3mGy.  Fluoroscopy time was 2.7 minutes     COMPARISON:   None    Impression:       Successful fluoroscopic guided biopsy of the L3-L4 intervertebral disc and L3 inferior endplate.     Electronically signed by resident: Tomy Frey   Date: 02/15/2022   Time: 12:48     Electronically signed by: Guille Gimenez MD   Date: 02/15/2022   Time: 13:22       Imaging History    2022  Date Procedure Name Status Accession Number Location   02/21/22 04:41 PM SURG FL Surgery Fluoro Usage Final [id number] [facility]   02/20/22 09:49 AM X-Ray Scoliosis Complete Final [id number] [facility]   02/15/22 05:58 PM US Abdomen Limited with Doppler (xpd) Final [id number] [facility]   02/15/22 05:21 PM X-Ray Lumbar Complete Including Flex And Ext Final [id number] [facility]   02/15/22 11:35 AM IR Biopsy Bone deep Final [id number] [facility]   02/14/22 10:43 PM MRI Spine Cervical-Thoracic-Lumbar W W/O Contrast (XPD) Final [id number] [facility]   02/14/22 10:42 PM MRI Brain W WO Contrast Final [id number] [facility]   02/14/22 01:11 PM CT Lumbar Spine Without Contrast Final [id number] [facility]    02/15/22 10:00 AM Echo Final [id number] [facility]

## 2022-02-23 NOTE — PLAN OF CARE
[facility] Care Plan    POC reviewed with Rachel Zazueta at at 1100. Pt verbalized understanding. Questions and concerns addressed. No acute events today. Went for Xray on 2nd floor no issues on trip. TLSO brace in room. Pt belongings returned from surgery. Jones and A-Line removed. Pt progressing toward goals. Will continue to monitor. See below and flowsheets for full assessment and VS info.       Neuro:  Jane Coma Scale  Best Eye Response: 4-->(E4) spontaneous  Best Motor Response: 6-->(M6) obeys commands  Best Verbal Response: 5-->(V5) oriented  [place] Coma Scale Score: 15  Assessment Qualifiers: patient not sedated/intubated, no eye obstruction present  Pupil PERRLA: yes     24 hr Temp:  [96.4 °F (35.8 °C)-98.8 °F (37.1 °C)]     CV:   Rhythm: sinus tachycardia  BP goals:   SBP < 160  MAP > 65    Resp:   O2 Device (Oxygen Therapy): room air       Plan: N/A    GI/:     Diet/Nutrition Received: regular  Last Bowel Movement: 02/20/22  Voiding Characteristics: voids spontaneously without difficulty    Intake/Output Summary (Last 24 hours) at 2/22/2022 1802  Last data filed at 2/22/2022 1700  Gross per 24 hour   Intake 1571.19 ml   Output 1855 ml   Net -283.81 ml     Unmeasured Output  Urine Occurrence: 1  Stool Occurrence: 0  Pad Count: 1    Labs/Accuchecks:  Recent Labs   Lab 02/22/22  0342   WBC 9.30   RBC 3.29*   HGB 9.3*   HCT 29.3*         Recent Labs   Lab 02/22/22  0342 02/22/22  1318   * 132*   K 4.5  --    CO2 22*  --      --    BUN 15  --    CREATININE 0.5  --    ALKPHOS 97  --    ALT 19  --    AST 48*  --    BILITOT 2.7*  --       Recent Labs   Lab 02/22/22  0342   INR 1.3*    No results for input(s): CPK, CPKMB, TROPONINI, MB in the last 168 hours.    Electrolytes: N/A - electrolytes WDL  Accuchecks: none    Gtts:      LDA/Wounds:  Lines/Drains/Airways       Peripherally Inserted Central Catheter Line  Duration             PICC Double Lumen 02/22/22 1110 right basilic <1 day               Drain  Duration                  Closed/Suction Drain 02/21/22 1644 Left;Medial Back Accordion 10 Fr. 1 day         Closed/Suction Drain 02/21/22 1645 Right;Medial Back Accordion 10 Fr. 1 day              Peripheral Intravenous Line  Duration                  Peripheral IV - Single Lumen 02/21/22 1355 16 G Left Forearm 1 day         Peripheral IV - Single Lumen 02/21/22 1824 20 G Anterior;Left;Proximal Forearm <1 day                  Wounds: Yes  Wound care consulted: No

## 2022-02-23 NOTE — ASSESSMENT & PLAN NOTE
40 y/o F with hx of epidural abscess s/p L3 and L4 laminectomy and L3-L5 TLIF (4/2021) admitted from NS clinic with worsening lumbar back pain and LE weakness. MRI C/T/L spine showed suspected discitis/osteomyelitis at L3-4 and L4-5 with probable hardware infection. IR consulted for biopsy. TTE 2/15 with EF 60%. EKG with NS.    Surgical Risk Assessment    Active cardiac issues:  Active decompensated heart failure? No   Unstable angina?  No   Significant uncontrolled arrhythmias? No   Severe valvular heart disease-Aortic or Mitral Stenosis? No   Recent MI or coronary revascularization < 30 days? No     Cardiac Risk Factors  History of CAD/ischemic heart disease? No   History of cerebrovascular disease? No   History of compensated heart failure? No   Type 2 diabetes requiring insulin? No   Serum Creatinine > 2? No   Total cardiac risk factors 0     Functional mets limited by pain, able to walk with walker.    < 4* METs -unable to walk > 2 blocks on level ground without stopping due to symptoms  - eating, dressing, toileting, walking indoors, light housework. POOR   > 4* METs -climbing > 1 flight of stairs without stopping  -walking up hill > 1-2 blocks  -scrubbing floors  -moving furniture  - golf, bowling, dancing or tennis  -running short distance MODERATE to EXCELLENT   * performance of any one of the activities       Cardiovascular Risk Assessment:  Non-emergent surgery.  No active cardiac problems (such as unstable angina, decompensated heart failure, significant uncontrolled arrhythmias or severe valvular disease).  Intermediate risk surgery.  Functional Status: limited by pain  Her revised cardiac risk index is 0 (3.9% 30-day risk of death, MI, or cardiac arrest).       - S/p washout, hardware removal, extension of fusion 2/21  - Will continue to follow

## 2022-02-23 NOTE — ASSESSMENT & PLAN NOTE
Rachel Zazueta is a 41 F with PMH hepatitis C, cirrhosis, pancytopenia, nicotine abuse, hx IVDU (last use 2 years ago), strep agalactae bacteremia, s/p L3-5 TLIF on 4/16/2021 who presents with persistent and progressive lumbar spondylodiscitis with resultant hardware failure and new scoliosis.     --Patient admitted to NPU on telemetry;       -q4h neurochecks on floor   --Xray with TLSO brace in place   --XR lumbar spine flex/ext completed.   --All labs and diagnostics personally reviewed  --Follow-up MRI Brain, C/T/L w/wo contrast   -No evidence of osteomyelitis, discitis, or epidural abscess within the cervical or thoracic spine   -No diffusion positive or abnormal enhancement within the brain   -MRI brain shows patchy nonspecific increased T2 and FLAIR signal in the periventricular and subcortical white matter bilaterally. Could be due to chronic microvascular ischemic changes vs demyelinating disease given patient's age. No evidence of active demyelination.   -DDD C4-7 with mild to moderate canal stenosis  --Full infectious w/u    -ESR 57, CRP 29, Procal 0.05, Lactate 1.1, BCx 2/14 NGTD, UA/Ucx pending   -F/u ID recs - started on empiric abx, continue.  --S/p L3-4 disc biopsy and L3 bone biopsy with IR 2/15 - Gram stain rare WBC. Cx NGTD.  -- consult for risk stratification and medical optimization. Appreciate assistance. RCRI 0, 3.9% risk of perioperative cardiac complication.   --Utox + for amphetamines, addiction psych consulted per .   --Hold anti-plt/coag medications  --Monitor for urinary retention - voiding spontaneous. Bladder scan prn.  --Pain control: Oxy 10 q4h PRN for moderate pain, Oxy 15 q4h PRN for severe pain, Dilaudid 1 mg q4h PRN when pain not relieved by PO meds. Continue lyrica 50 mg TID.  --Continue to monitor clinically, notify NSGY immediately with any changes in neuro status  --Drains: Keep, continue IV abx while in place  --DVT ppx: TEDs/SCDs/SQH  --Bowel regimen: Miralax, senna,  lactulose 2/23  --Activity: PT/OT OOB, TLSO brace when OOB    Dispo: Rehab pending drain removal.

## 2022-02-23 NOTE — NURSING
Nursing Transfer Note       Transfer To 929    Transfer via bed    Transported by RN x1 and PCT x1    Medicines sent: no    Chart sent with patient: Yes    Belongings sent with patient: two patient belonging bags and TLSO brace    Notified: pt notified family    Bedside Neuro assessment performed: Yes    Bedside Handoff given to: RAUL Estrada    Upon arrival to floor: patient oriented to room, call bell in reach and bed in lowest position

## 2022-02-23 NOTE — PROGRESS NOTES
[facility] - Neurosurgery ([facility])  [facility]  Progress Note    Patient Name: Rachel Zazueta  MRN: [id number]  Patient Class: IP- Inpatient   Admission Date: 2/14/2022  Length of Stay: 9 days  Attending Physician: Guille Templeton MD  Primary Care Provider: Dannielle Merino DO        Subjective:     Principal Problem:Spondylodiscitis        HPI:  Rachel Zazueta is a 40 y/o F with a PMH of cirrhosis, Hep C, anemia, polysubstance abuse, and epidural abscess s/p L3-L4 laminectomy and L3-L5 TLIF (4/2021; blood and surgical cultures positive for group B strep) who was admitted from NS clinic for workup of severe back pain and LE weakness. She reports that she has had worsening pain in her lower back and hips since October. She also reports shooting pain down her left leg. She ambulates with a walker at home due to the pain and difficulty with balance. She denies bowel or bladder incontinence. She denies CP, SOB, nausea, vomiting. Denies hx of HTN or DM. She was lost to follow up after her previous surgery, stating that she did not go to her appointments because she was feeling good. She states that her last IV meth use was 8/2020, and last meth use was after her previous surgery 4/2021 (however tox screen 7/2021 positive for amphetamines). She reports marijuana use approx 3x per week and smokes cigarettes 1/2 pack per day. She denies use of other substances including cocaine. MRI C/T/L spine showed suspected discitis/osteomyelitis at L3-4 and L4-5 with probable hardware infection. L3-4 disc biopsy obtained by IR. Hospital medicine was consulted for preop risk stratification and medical optimization.       Overview/Hospital Course:  RCRI 0. TTE with EF 60%, no endocarditis.  ID following for abx recs, started empiric vanc and ceftriaxone. Bcx NGTD. L3-4 intervertebral disc biopsy with IR, cx negative so far. Utox positive for amphetamines and methadone metabolites. Addiction psych consulted.       Interval  History: S/p hardware removal, washout, and extension of fusion on 2/21. Stepped down from [facility]. Pt complains of back pain but denies other complaints.     Review of Systems   Constitutional:  Negative for chills and fever.   HENT:  Negative for congestion.    Eyes:  Negative for visual disturbance.   Respiratory:  Negative for chest tightness, shortness of breath and wheezing.    Cardiovascular:  Negative for chest pain and palpitations.   Gastrointestinal:  Negative for abdominal pain, nausea and vomiting.   Genitourinary:  Negative for dysuria.   Musculoskeletal:  Positive for back pain and gait problem. Negative for neck pain.   Skin:  Negative for color change.   Neurological:  Positive for weakness.   Psychiatric/Behavioral:  Negative for agitation and confusion.    Objective:     Vital Signs (Most Recent):  Temp: 97.8 °F (36.6 °C) (02/23/22 0732)  Pulse: 99 (02/23/22 0732)  Resp: 20 (02/23/22 0905)  BP: (!) 111/55 (02/23/22 0732)  SpO2: 99 % (02/23/22 0732)   Vital Signs (24h Range):  Temp:  [97.8 °F (36.6 °C)-99.8 °F (37.7 °C)] 97.8 °F (36.6 °C)  Pulse:  [] 99  Resp:  [15-26] 20  SpO2:  [92 %-100 %] 99 %  BP: (111-133)/(53-70) 111/55     Weight: 133.8 kg (294 lb 15.6 oz)  Body mass index is 46.2 kg/m².    Intake/Output Summary (Last 24 hours) at 2/23/2022 1044  Last data filed at 2/23/2022 0425  Gross per 24 hour   Intake 1245.67 ml   Output 2340 ml   Net -1094.33 ml      Physical Exam  Constitutional:       General: She is not in acute distress.     Appearance: She is not toxic-appearing.   HENT:      Head: Normocephalic and atraumatic.      Mouth/Throat:      Mouth: Mucous membranes are moist.   Eyes:      Extraocular Movements: Extraocular movements intact.      Conjunctiva/sclera: Conjunctivae normal.   Cardiovascular:      Rate and Rhythm: Normal rate and regular rhythm.      Heart sounds: Normal heart sounds. No murmur heard.  Pulmonary:      Effort: Pulmonary effort is normal. No respiratory  distress.      Breath sounds: Normal breath sounds. No wheezing or rales.   Abdominal:      General: Bowel sounds are normal. There is no distension.      Palpations: Abdomen is soft.      Tenderness: There is no abdominal tenderness.   Musculoskeletal:      Cervical back: Normal range of motion.      Right lower leg: No edema.      Left lower leg: No edema.   Skin:     General: Skin is warm and dry.   Neurological:      General: No focal deficit present.      Mental Status: She is alert and oriented to person, place, and time.      Motor: Weakness present.       Significant Labs: All pertinent labs within the past 24 hours have been reviewed.    Significant Imaging: I have reviewed all pertinent imaging results/findings within the past 24 hours.      Assessment/Plan:      * Spondylodiscitis  Hx of epidural abscess 4/2021 with blood and surgical cultures positive for group B strep.  Echo 2/15 negative for endocarditis. ID following for antibiotic recs.     Recommendations:  - blood cx negative   - biopsy cx negative   - urine tox screen positive for amphetamines, methadone; received opiates and benzos while admitted  - addiction psych consulted   - continue antibiotics per ID recs  - f/u surgical cultures   - PICC placed  - will need placement to continue IV abx       Hyperbilirubinemia  Tbili 2.1 on admission.     Recommendations:  - daily CMP   - Tbili trending down     Coagulopathy        Tobacco use disorder  Reports currently smoking 1/2 ppd.      Pre-op evaluation  40 y/o F with hx of epidural abscess s/p L3 and L4 laminectomy and L3-L5 TLIF (4/2021) admitted from NSGY clinic with worsening lumbar back pain and LE weakness. MRI C/T/L spine showed suspected discitis/osteomyelitis at L3-4 and L4-5 with probable hardware infection. IR consulted for biopsy. TTE 2/15 with EF 60%. EKG with NS.    Surgical Risk Assessment    Active cardiac issues:  Active decompensated heart failure? No   Unstable angina?  No    Significant uncontrolled arrhythmias? No   Severe valvular heart disease-Aortic or Mitral Stenosis? No   Recent MI or coronary revascularization < 30 days? No     Cardiac Risk Factors  History of CAD/ischemic heart disease? No   History of cerebrovascular disease? No   History of compensated heart failure? No   Type 2 diabetes requiring insulin? No   Serum Creatinine > 2? No   Total cardiac risk factors 0     Functional mets limited by pain, able to walk with walker.    < 4* METs -unable to walk > 2 blocks on level ground without stopping due to symptoms  - eating, dressing, toileting, walking indoors, light housework. POOR   > 4* METs -climbing > 1 flight of stairs without stopping  -walking up hill > 1-2 blocks  -scrubbing floors  -moving furniture  - golf, bowling, dancing or tennis  -running short distance MODERATE to EXCELLENT   * performance of any one of the activities       Cardiovascular Risk Assessment:  Non-emergent surgery.  No active cardiac problems (such as unstable angina, decompensated heart failure, significant uncontrolled arrhythmias or severe valvular disease).  Intermediate risk surgery.  Functional Status: limited by pain  Her revised cardiac risk index is 0 (3.9% 30-day risk of death, MI, or cardiac arrest).       - S/p washout, hardware removal, extension of fusion 2/21  - Will continue to follow        Thrombocytopenia  Chronic, likely due to cirrhosis.     Recommendations:  - daily CBC      Chronic hepatitis C with cirrhosis  PMH cirrhosis 2/2 HCV. Pt denies ever receiving treatment for HCV. Pt reports previous liver biopsy, but no records of this available. She has been referred to hepatology previously but did not follow up.   MELD-Na score: 11 at 2/23/2022  6:52 AM  MELD score: 11 at 2/23/2022  6:52 AM  Calculated from:  Serum Creatinine: 0.6 mg/dL (Using min of 1 mg/dL) at 2/23/2022  6:52 AM  Serum Sodium: 136 mmol/L at 2/23/2022  6:52 AM  Total Bilirubin: 1.5 mg/dL at 2/23/2022   6:52 AM  INR(ratio): 1.3 at 2/23/2022  6:52 AM  Age: 41 years    Recommendations:  - U/S abdomen with doppler showed cirrhosis and portal hypertension satisfactory Doppler evaluation   - daily CMP and INR  - will need hepatology referral on discharge   - lipase WNL, RUQ/epigastric pain resolved after BM     Cirrhosis of liver without ascites        Pancytopenia  Hx of bone marrow biopsy 2017 that was nondiagnostic.     Recommendations:  - daily labs  - transfuse for Hgb <7  - transfuse for plt <10 or <50 with bleeding        VTE Risk Mitigation (From admission, onward)         Ordered     heparin (porcine) injection 5,000 Units  Every 8 hours         02/22/22 0914     heparin (porcine) injection 7,500 Units  Every 8 hours         02/18/22 1353     IP VTE HIGH RISK PATIENT  Once         02/14/22 1936     Place sequential compression device  Until discontinued         02/14/22 1936                Discharge Planning   SHIRA: 2/24/2022     Code Status: Full Code   Is the patient medically ready for discharge?:     Reason for patient still in hospital (select all that apply): Patient trending condition  Discharge Plan A: Home with family   Discharge Delays: None known at this time            Cecille Rhodes MD  Department of Hospital Medicine   [facility] - Neurosurgery ([facility])

## 2022-02-23 NOTE — PROGRESS NOTES
Pharmacokinetic Assessment Follow Up: IV Vancomycin    Vancomycin Regimen Assessment & Plan:  - Vancomycin trough level (11-hour level) resulted at 16.4 mcg/mL, which is considered therapeutic (goal: 15-20 mcg/mL)  - Stable serum creatinine  - Continue vancomycin 2000 mg IV every 12 hours  - Next vancomycin level scheduled on 2/24 at 08:00 or sooner if change in renal function      Drug levels (last 3 results):  Recent Labs   Lab Result Units 02/20/22  1632 02/22/22  1955   Vancomycin-Trough ug/mL 21.4 16.4       Pharmacy will continue to follow and monitor vancomycin.    Please contact pharmacy at extension [phone] for questions regarding this assessment.    Thank you for the consult,   Kenna Marks       Patient brief summary:  Rachel Zazueta is a 41 y.o. female initiated on antimicrobial therapy with IV Vancomycin for treatment of bone/joint infection      Drug Allergies:   Review of patient's allergies indicates:   Allergen Reactions    Bee venom protein (honey bee) Anaphylaxis     Patient reports she is allergic to bee stings.    Wasp sting [allergen ext-venom-honey bee] Anaphylaxis    Adhesive Blisters    Strawberry Hives     Patient reports itching and hives    Iodine and iodide containing products Hives    Naproxen Other (See Comments)     Throat closing    Shellfish containing products     Nuts [tree nut] Rash       Actual Body Weight:   133.8 kg    Renal Function:   Estimated Creatinine Clearance: 211.5 mL/min (based on SCr of 0.5 mg/dL).     Dialysis Method (if applicable):  N/A    CBC (last 72 hours):  Recent Labs   Lab Result Units 02/20/22  0244 02/21/22  0444 02/21/22  1817 02/22/22  0342   WBC K/uL 3.07* 3.31* 7.49 9.30   Hemoglobin g/dL 10.3* 11.0* 9.0* 9.3*   Hematocrit % 33.6* 35.4* 28.8* 29.3*   Platelets K/uL 101* 120* 229 165   Gran % % 59.3 63.1 87.8* 89.0*   Lymph % % 24.4 22.4 9.1* 6.1*   Mono % % 9.4 8.8 1.2* 4.2   Eosinophil % % 4.9 4.2 0.3 0.0   Basophil % % 1.0 0.9 0.4 0.2    Differential Method  Automated Automated Automated Automated       Metabolic Panel (last 72 hours):  Recent Labs   Lab Result Units 02/20/22  0244 02/21/22  0444 02/21/22  1817 02/22/22  0342 02/22/22  1318   Sodium mmol/L 138 137 137 132* 132*   Potassium mmol/L 4.5 4.1 4.8 4.5  --    Chloride mmol/L 107 106 107 104  --    CO2 mmol/L 26 27 22* 22*  --    Glucose mg/dL 86 91 148* 110  --    BUN mg/dL 10 10 13 15  --    Creatinine mg/dL 0.6 0.6 0.6 0.5  --    Albumin g/dL 2.2* 2.3* 2.1* 2.2*  --    Total Bilirubin mg/dL 1.4* 1.6* 1.5* 2.7*  --    Alkaline Phosphatase U/L 115 117 92 97  --    AST U/L 41* 50* 66* 48*  --    ALT U/L 14 19 21 19  --        Vancomycin Administrations:  vancomycin given in the last 96 hours                   vancomycin 2 g in dextrose 5 % 500 mL IVPB (mg) 2,000 mg New Bag 02/18/22 0403     2,000 mg New Bag 02/17/22 1649     2,000 mg New Bag  0423     2,000 mg New Bag 02/16/22 1600     2,000 mg New Bag  0515      Restarted 02/15/22 1755     2,000 mg New Bag  1559                Microbiologic Results:  Microbiology Results (last 7 days)     Procedure Component Value Units Date/Time    AFB Culture & Smear [[id number]] Collected: 02/21/22 1539    Order Status: Completed Specimen: Abscess from Back Updated: 02/22/22 2127     AFB Culture & Smear Culture in progress     AFB CULTURE STAIN No acid fast bacilli seen.    Narrative:      Epidural purulence #1    AFB Culture & Smear [[id number]] Collected: 02/21/22 1539    Order Status: Completed Specimen: Abscess from Back Updated: 02/22/22 2127     AFB Culture & Smear Culture in progress     AFB CULTURE STAIN No acid fast bacilli seen.    Narrative:      Epidural purulence #2    Culture, Anaerobic [[id number]] Collected: 02/15/22 1128    Order Status: Completed Specimen: Biopsy from Back Updated: 02/22/22 0656     Anaerobic Culture No anaerobes isolated    Narrative:      L3-4 / L4-5 osteodiscitis    Culture, Anaerobe [[id number]] Collected: 02/21/22  1539    Order Status: Completed Specimen: Abscess from Back Updated: 02/22/22 0648     Anaerobic Culture Culture in progress    Narrative:      Epidural purulence #1    Culture, Anaerobe [[id number]] Collected: 02/21/22 1539    Order Status: Completed Specimen: Abscess from Back Updated: 02/22/22 0648     Anaerobic Culture Culture in progress    Narrative:      Epidural purulence #2    Aerobic culture [[id number]] Collected: 02/21/22 1539    Order Status: Completed Specimen: Abscess from Back Updated: 02/22/22 0632     Aerobic Bacterial Culture No growth    Narrative:      Epidural purulence #2    Gram stain [[id number]] Collected: 02/21/22 1539    Order Status: Completed Specimen: Abscess from Back Updated: 02/21/22 1753     Gram Stain Result No organisms seen      Few WBC's    Narrative:      Epidural purulence #2    Gram stain [[id number]] Collected: 02/21/22 1539    Order Status: Completed Specimen: Abscess from Back Updated: 02/21/22 1751     Gram Stain Result No organisms seen      Few WBC's    Narrative:      Epidural purulence #1    Aerobic culture [[id number]] Collected: 02/21/22 1539    Order Status: Sent Specimen: Abscess from Back Updated: 02/21/22 1659    Fungus culture [[id number]] Collected: 02/21/22 1539    Order Status: Sent Specimen: Abscess from Back Updated: 02/21/22 1657    Fungus culture [[id number]] Collected: 02/21/22 1539    Order Status: Sent Specimen: Abscess from Back Updated: 02/21/22 1656    Fungus culture [[id number]] Collected: 02/15/22 1128    Order Status: Completed Specimen: Biopsy from Back Updated: 02/21/22 1150     Fungus (Mycology) Culture Culture in progress    Narrative:      L3-4 / L4-5 osteodiscitis    Blood Culture #2 **CANNOT BE ORDERED STAT** [[id number]] Collected: 02/14/22 1820    Order Status: Completed Specimen: Blood from Peripheral, Antecubital, Right Updated: 02/19/22 2012     Blood Culture, Routine No growth after 5 days.    Blood Culture #1 **CANNOT BE ORDERED STAT**  [[id number]] Collected: 02/14/22 1820    Order Status: Completed Specimen: Blood from Peripheral, Forearm, Right Updated: 02/19/22 2012     Blood Culture, Routine No growth after 5 days.    Aerobic culture [[id number]] Collected: 02/15/22 1128    Order Status: Completed Specimen: Biopsy from Back Updated: 02/19/22 1030     Aerobic Bacterial Culture No growth    Narrative:      L3-4 / L4-5 osteodiscitis    AFB Culture & Smear [[id number]] Collected: 02/15/22 1128    Order Status: Completed Specimen: Biopsy from Back Updated: 02/16/22 2127     AFB Culture & Smear Culture in progress     AFB CULTURE STAIN No acid fast bacilli seen.    Narrative:      L3-4 / L4-5 osteodiscitis

## 2022-02-23 NOTE — ASSESSMENT & PLAN NOTE
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    Patient underwent L3-4 intervertebral disc biopsy on 2/15. She is s/p hardware removal on 2/21 though cages retained L3-4 and L4-5.  Patient was on empiric Vancomycin and Ceftriaxone - now rocephin alone. IR/OR cultures and blood cx are NGTD. Afebrile. HDS.  On high dose ceftriaxone given cultures from epidural space. Stable non septic    Recommendations  · Continue empiric Ceftriaxone 2g IV q12h  · Fu cultures  · Redo fusion on 3/2/22   · Anticipate 8 weeks of IV antibiotics from day of surgery  · Follow x 1 more day then sign off till next surgery then reconsult on 3/2  · Discussed antibiotic plan with ID staff. ID will follow

## 2022-02-23 NOTE — ASSESSMENT & PLAN NOTE
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 Pt denies ever receiving treatment for HCV. Pt reports previous liver biopsy, but no records of this available. She has been referred to hepatology previously but did not follow up.   MELD-Na score: 11 at 2/23/2022  6:52 AM  MELD score: 11 at 2/23/2022  6:52 AM  Calculated from:  Serum Creatinine: 0.6 mg/dL (Using min of 1 mg/dL) at 2/23/2022  6:52 AM  Serum Sodium: 136 mmol/L at 2/23/2022  6:52 AM  Total Bilirubin: 1.5 mg/dL at 2/23/2022  6:52 AM  INR(ratio): 1.3 at 2/23/2022  6:52 AM  Age: 41 years    Recommendations:  - U/S abdomen with doppler showed cirrhosis and portal hypertension satisfactory Doppler evaluation   - daily CMP and INR  - will need hepatology referral on discharge   - lipase WNL, RUQ/epigastric pain resolved after BM

## 2022-02-23 NOTE — ASSESSMENT & PLAN NOTE
Hx of epidural abscess 4/2021 with blood and surgical cultures positive for group B strep.  Echo 2/15 negative for endocarditis. ID following for antibiotic recs.     Recommendations:  - blood cx negative   - biopsy cx negative   - urine tox screen positive for amphetamines, methadone; received opiates and benzos while admitted  - addiction psych consulted   - continue antibiotics per ID recs  - f/u surgical cultures   - PICC placed  - will need placement to continue IV abx

## 2022-02-23 NOTE — PLAN OF CARE
Problem: Adult Inpatient Plan of Care  Goal: Plan of Care Review  Outcome: Ongoing, Progressing  Flowsheets (Taken 2/23/2022 0452)  Plan of Care Reviewed With: patient     Problem: Fall Injury Risk  Goal: Absence of Fall and Fall-Related Injury  Outcome: Ongoing, Progressing  Intervention: Identify and Manage Contributors  Flowsheets (Taken 2/23/2022 0452)  Self-Care Promotion: independence encouraged  Medication Review/Management:   high-risk medications identified   medications reviewed  Intervention: Promote Injury-Free Environment  Flowsheets (Taken 2/23/2022 0452)  Safety Promotion/Fall Prevention:   assistive device/personal item within reach   bed alarm set   Fall Risk reviewed with patient/family   side rails raised x 3   instructed to call staff for mobility   lighting adjusted   medications reviewed   nonskid shoes/socks when out of bed   Pt did well overnight with pain control, POC reviewed with pt and she verbalized understanding. Will continue to monitor

## 2022-02-23 NOTE — PROGRESS NOTES
Pharmacokinetic Assessment Follow Up: IV Vancomycin    Therapy with vancomycin complete and/or consult discontinued by provider.  Pharmacy will sign off, please re-consult as needed.    Mihai Larry, PharmD, BCPS

## 2022-02-23 NOTE — PLAN OF CARE
02/22/22 1834   Post-Acute Status   Post-Acute Authorization Placement   Post-Acute Placement Status Referrals Sent  (rehab)     ALICIA met with Pt at bedside. Discussed therapy recs for rehab and provided list. Addressed questions and reported need to send referrals to obtain accepting options. Pt agreeable and will review the list for preferences asap. Currently her preference is between [facility] and [place] as that is where her boyfriend works.     ALICIA sent referrals via [employer].    Laurel Phan LCSW  Neurocritical Care   [facility]  [phone]

## 2022-02-23 NOTE — PLAN OF CARE
Patient off floor at time of being seen.    Chart review:  Afebrile and WBC WNL  POD #1 s/p L spine hardware removal  Cultures from purulence seen were sent and are NGTD    Plan:  DC vancomycin as no GPC growth.  Continue Ceftriaxone.  Discussed with ID staff - will see in am      Pj Toussaint PA-C MPH

## 2022-02-23 NOTE — SUBJECTIVE & OBJECTIVE
Interval History: S/p hardware removal, washout, and extension of fusion on 2/21. Stepped down from [facility]. Pt complains of back pain but denies other complaints.     Review of Systems   Constitutional:  Negative for chills and fever.   HENT:  Negative for congestion.    Eyes:  Negative for visual disturbance.   Respiratory:  Negative for chest tightness, shortness of breath and wheezing.    Cardiovascular:  Negative for chest pain and palpitations.   Gastrointestinal:  Negative for abdominal pain, nausea and vomiting.   Genitourinary:  Negative for dysuria.   Musculoskeletal:  Positive for back pain and gait problem. Negative for neck pain.   Skin:  Negative for color change.   Neurological:  Positive for weakness.   Psychiatric/Behavioral:  Negative for agitation and confusion.    Objective:     Vital Signs (Most Recent):  Temp: 97.8 °F (36.6 °C) (02/23/22 0732)  Pulse: 99 (02/23/22 0732)  Resp: 20 (02/23/22 0905)  BP: (!) 111/55 (02/23/22 0732)  SpO2: 99 % (02/23/22 0732)   Vital Signs (24h Range):  Temp:  [97.8 °F (36.6 °C)-99.8 °F (37.7 °C)] 97.8 °F (36.6 °C)  Pulse:  [] 99  Resp:  [15-26] 20  SpO2:  [92 %-100 %] 99 %  BP: (111-133)/(53-70) 111/55     Weight: 133.8 kg (294 lb 15.6 oz)  Body mass index is 46.2 kg/m².    Intake/Output Summary (Last 24 hours) at 2/23/2022 1044  Last data filed at 2/23/2022 0425  Gross per 24 hour   Intake 1245.67 ml   Output 2340 ml   Net -1094.33 ml      Physical Exam  Constitutional:       General: She is not in acute distress.     Appearance: She is not toxic-appearing.   HENT:      Head: Normocephalic and atraumatic.      Mouth/Throat:      Mouth: Mucous membranes are moist.   Eyes:      Extraocular Movements: Extraocular movements intact.      Conjunctiva/sclera: Conjunctivae normal.   Cardiovascular:      Rate and Rhythm: Normal rate and regular rhythm.      Heart sounds: Normal heart sounds. No murmur heard.  Pulmonary:      Effort: Pulmonary effort is normal. No  respiratory distress.      Breath sounds: Normal breath sounds. No wheezing or rales.   Abdominal:      General: Bowel sounds are normal. There is no distension.      Palpations: Abdomen is soft.      Tenderness: There is no abdominal tenderness.   Musculoskeletal:      Cervical back: Normal range of motion.      Right lower leg: No edema.      Left lower leg: No edema.   Skin:     General: Skin is warm and dry.   Neurological:      General: No focal deficit present.      Mental Status: She is alert and oriented to person, place, and time.      Motor: Weakness present.       Significant Labs: All pertinent labs within the past 24 hours have been reviewed.    Significant Imaging: I have reviewed all pertinent imaging results/findings within the past 24 hours.

## 2022-02-24 LAB
ABO + RH BLD: NORMAL
ALBUMIN SERPL BCP-MCNC: 2 G/DL (ref 3.5–5.2)
ALP SERPL-CCNC: 88 U/L (ref 55–135)
ALT SERPL W/O P-5'-P-CCNC: 22 U/L (ref 10–44)
ANION GAP SERPL CALC-SCNC: 4 MMOL/L (ref 8–16)
AST SERPL-CCNC: 44 U/L (ref 10–40)
BACTERIA SPEC AEROBE CULT: NO GROWTH
BASOPHILS # BLD AUTO: 0.05 K/UL (ref 0–0.2)
BASOPHILS NFR BLD: 0.9 % (ref 0–1.9)
BILIRUB SERPL-MCNC: 1.2 MG/DL (ref 0.1–1)
BLD GP AB SCN CELLS X3 SERPL QL: NORMAL
BLD PROD TYP BPU: NORMAL
BLOOD UNIT EXPIRATION DATE: NORMAL
BLOOD UNIT TYPE CODE: 6200
BLOOD UNIT TYPE: NORMAL
BUN SERPL-MCNC: 15 MG/DL (ref 6–20)
CALCIUM SERPL-MCNC: 8.2 MG/DL (ref 8.7–10.5)
CHLORIDE SERPL-SCNC: 103 MMOL/L (ref 95–110)
CO2 SERPL-SCNC: 28 MMOL/L (ref 23–29)
CODING SYSTEM: NORMAL
CREAT SERPL-MCNC: 0.6 MG/DL (ref 0.5–1.4)
CRP SERPL-MCNC: 7.1 MG/L (ref 0–8.2)
DIFFERENTIAL METHOD: ABNORMAL
DISPENSE STATUS: NORMAL
EOSINOPHIL # BLD AUTO: 0.2 K/UL (ref 0–0.5)
EOSINOPHIL NFR BLD: 3.6 % (ref 0–8)
ERYTHROCYTE [DISTWIDTH] IN BLOOD BY AUTOMATED COUNT: 18 % (ref 11.5–14.5)
ERYTHROCYTE [SEDIMENTATION RATE] IN BLOOD BY WESTERGREN METHOD: 8 MM/HR (ref 0–36)
EST. GFR  (AFRICAN AMERICAN): >60 ML/MIN/1.73 M^2
EST. GFR  (NON AFRICAN AMERICAN): >60 ML/MIN/1.73 M^2
GLUCOSE SERPL-MCNC: 115 MG/DL (ref 70–110)
HCT VFR BLD AUTO: 24 % (ref 37–48.5)
HGB BLD-MCNC: 7.3 G/DL (ref 12–16)
IMM GRANULOCYTES # BLD AUTO: 0.05 K/UL (ref 0–0.04)
IMM GRANULOCYTES NFR BLD AUTO: 0.9 % (ref 0–0.5)
INR PPP: 1.3 (ref 0.8–1.2)
LYMPHOCYTES # BLD AUTO: 1 K/UL (ref 1–4.8)
LYMPHOCYTES NFR BLD: 19.4 % (ref 18–48)
MCH RBC QN AUTO: 28.2 PG (ref 27–31)
MCHC RBC AUTO-ENTMCNC: 30.4 G/DL (ref 32–36)
MCV RBC AUTO: 93 FL (ref 82–98)
MONOCYTES # BLD AUTO: 0.5 K/UL (ref 0.3–1)
MONOCYTES NFR BLD: 9.7 % (ref 4–15)
NEUTROPHILS # BLD AUTO: 3.5 K/UL (ref 1.8–7.7)
NEUTROPHILS NFR BLD: 65.5 % (ref 38–73)
NRBC BLD-RTO: 1 /100 WBC
PLATELET # BLD AUTO: 101 K/UL (ref 150–450)
PMV BLD AUTO: 9.4 FL (ref 9.2–12.9)
POTASSIUM SERPL-SCNC: 3.8 MMOL/L (ref 3.5–5.1)
PROT SERPL-MCNC: 5.8 G/DL (ref 6–8.4)
PROTHROMBIN TIME: 13.5 SEC (ref 9–12.5)
RBC # BLD AUTO: 2.59 M/UL (ref 4–5.4)
SODIUM SERPL-SCNC: 135 MMOL/L (ref 136–145)
TRANS ERYTHROCYTES VOL PATIENT: NORMAL ML
WBC # BLD AUTO: 5.35 K/UL (ref 3.9–12.7)

## 2022-02-24 PROCEDURE — A4216 STERILE WATER/SALINE, 10 ML: HCPCS | Performed by: NEUROLOGICAL SURGERY

## 2022-02-24 PROCEDURE — 63600175 PHARM REV CODE 636 W HCPCS: Performed by: PHYSICIAN ASSISTANT

## 2022-02-24 PROCEDURE — P9021 RED BLOOD CELLS UNIT: HCPCS

## 2022-02-24 PROCEDURE — 25000003 PHARM REV CODE 250

## 2022-02-24 PROCEDURE — 36415 COLL VENOUS BLD VENIPUNCTURE: CPT | Performed by: STUDENT IN AN ORGANIZED HEALTH CARE EDUCATION/TRAINING PROGRAM

## 2022-02-24 PROCEDURE — 85025 COMPLETE CBC W/AUTO DIFF WBC: CPT | Performed by: STUDENT IN AN ORGANIZED HEALTH CARE EDUCATION/TRAINING PROGRAM

## 2022-02-24 PROCEDURE — 86850 RBC ANTIBODY SCREEN: CPT | Performed by: NEUROLOGICAL SURGERY

## 2022-02-24 PROCEDURE — 97535 SELF CARE MNGMENT TRAINING: CPT

## 2022-02-24 PROCEDURE — 25000003 PHARM REV CODE 250: Performed by: PHYSICIAN ASSISTANT

## 2022-02-24 PROCEDURE — 97530 THERAPEUTIC ACTIVITIES: CPT

## 2022-02-24 PROCEDURE — 85610 PROTHROMBIN TIME: CPT

## 2022-02-24 PROCEDURE — 63600175 PHARM REV CODE 636 W HCPCS: Performed by: STUDENT IN AN ORGANIZED HEALTH CARE EDUCATION/TRAINING PROGRAM

## 2022-02-24 PROCEDURE — 86920 COMPATIBILITY TEST SPIN: CPT

## 2022-02-24 PROCEDURE — 99233 SBSQ HOSP IP/OBS HIGH 50: CPT | Mod: ,,, | Performed by: PHYSICIAN ASSISTANT

## 2022-02-24 PROCEDURE — 85652 RBC SED RATE AUTOMATED: CPT | Performed by: STUDENT IN AN ORGANIZED HEALTH CARE EDUCATION/TRAINING PROGRAM

## 2022-02-24 PROCEDURE — 99024 POSTOP FOLLOW-UP VISIT: CPT | Mod: ,,,

## 2022-02-24 PROCEDURE — 25000003 PHARM REV CODE 250: Performed by: STUDENT IN AN ORGANIZED HEALTH CARE EDUCATION/TRAINING PROGRAM

## 2022-02-24 PROCEDURE — 99233 PR SUBSEQUENT HOSPITAL CARE,LEVL III: ICD-10-PCS | Mod: ,,, | Performed by: PHYSICIAN ASSISTANT

## 2022-02-24 PROCEDURE — 36430 TRANSFUSION BLD/BLD COMPNT: CPT

## 2022-02-24 PROCEDURE — 99024 PR POST-OP FOLLOW-UP VISIT: ICD-10-PCS | Mod: ,,,

## 2022-02-24 PROCEDURE — 86140 C-REACTIVE PROTEIN: CPT | Performed by: STUDENT IN AN ORGANIZED HEALTH CARE EDUCATION/TRAINING PROGRAM

## 2022-02-24 PROCEDURE — 80053 COMPREHEN METABOLIC PANEL: CPT

## 2022-02-24 PROCEDURE — 11000001 HC ACUTE MED/SURG PRIVATE ROOM

## 2022-02-24 PROCEDURE — 25000003 PHARM REV CODE 250: Performed by: NEUROLOGICAL SURGERY

## 2022-02-24 RX ORDER — HYDROCODONE BITARTRATE AND ACETAMINOPHEN 500; 5 MG/1; MG/1
TABLET ORAL
Status: DISCONTINUED | OUTPATIENT
Start: 2022-02-24 | End: 2022-03-03

## 2022-02-24 RX ADMIN — HEPARIN SODIUM 5000 UNITS: 5000 INJECTION INTRAVENOUS; SUBCUTANEOUS at 03:02

## 2022-02-24 RX ADMIN — OXYCODONE HYDROCHLORIDE 15 MG: 10 TABLET ORAL at 08:02

## 2022-02-24 RX ADMIN — Medication 10 ML: at 12:02

## 2022-02-24 RX ADMIN — HEPARIN SODIUM 5000 UNITS: 5000 INJECTION INTRAVENOUS; SUBCUTANEOUS at 08:02

## 2022-02-24 RX ADMIN — HEPARIN SODIUM 5000 UNITS: 5000 INJECTION INTRAVENOUS; SUBCUTANEOUS at 05:02

## 2022-02-24 RX ADMIN — CEFTRIAXONE 2 G: 2 INJECTION, POWDER, FOR SOLUTION INTRAMUSCULAR; INTRAVENOUS at 02:02

## 2022-02-24 RX ADMIN — PREGABALIN 50 MG: 50 CAPSULE ORAL at 03:02

## 2022-02-24 RX ADMIN — SENNOSIDES AND DOCUSATE SODIUM 1 TABLET: 50; 8.6 TABLET ORAL at 08:02

## 2022-02-24 RX ADMIN — SERTRALINE HYDROCHLORIDE 50 MG: 50 TABLET ORAL at 08:02

## 2022-02-24 RX ADMIN — ACETAMINOPHEN 1000 MG: 500 TABLET ORAL at 05:02

## 2022-02-24 RX ADMIN — PANTOPRAZOLE SODIUM 40 MG: 20 TABLET, DELAYED RELEASE ORAL at 08:02

## 2022-02-24 RX ADMIN — OXYCODONE HYDROCHLORIDE 15 MG: 10 TABLET ORAL at 03:02

## 2022-02-24 RX ADMIN — PREGABALIN 50 MG: 50 CAPSULE ORAL at 08:02

## 2022-02-24 RX ADMIN — Medication 10 ML: at 06:02

## 2022-02-24 RX ADMIN — OXYCODONE HYDROCHLORIDE 15 MG: 10 TABLET ORAL at 02:02

## 2022-02-24 RX ADMIN — ACETAMINOPHEN 1000 MG: 500 TABLET ORAL at 03:02

## 2022-02-24 RX ADMIN — Medication 6 MG: at 08:02

## 2022-02-24 NOTE — ASSESSMENT & PLAN NOTE
Rachel Zazueta is a 41 F with PMH hepatitis C, cirrhosis, pancytopenia, nicotine abuse, hx IVDU (last use 2 years ago), strep agalactae bacteremia, s/p L3-5 TLIF on 4/16/2021 who presents with persistent and progressive lumbar spondylodiscitis with resultant hardware failure and new scoliosis.     S/p hardware removal on 2/21.    --Patient admitted to NPU on telemetry.      -q4h neurochecks on floor.   --Xray with TLSO brace in place.  --XR lumbar spine flex/ext completed.   --All labs and diagnostics personally reviewed  --Follow-up MRI Brain, C/T/L w/wo contrast:   -No evidence of osteomyelitis, discitis, or epidural abscess within the cervical or thoracic spine   -No diffusion positive or abnormal enhancement within the brain   -MRI brain shows patchy nonspecific increased T2 and FLAIR signal in the periventricular and subcortical white matter bilaterally. Could be due to chronic microvascular ischemic changes vs demyelinating disease given patient's age. No evidence of active demyelination.   -DDD C4-7 with mild to moderate canal stenosis  --Full infectious w/u.   -ESR 57, CRP 29, Procal 0.05, Lactate 1.1, BCx 2/14 NGTD, UA/Ucx pending. Wound Cx pending.   -F/u ID recs - now on Rocephin. PICC placed.   --S/p L3-4 disc biopsy and L3 bone biopsy with IR 2/15 - Gram stain rare WBC. Cx NGTD.  -- consult for risk stratification and medical optimization. Appreciate assistance. RCRI 0, 3.9% risk of perioperative cardiac complication.   --Utox + for amphetamines, addiction psych consulted per .   --Hold anti-plt/coag medications.  --Monitor for urinary retention - voiding spontaneously. Bladder scan prn.  --Pain control: Oxy 10 q4h PRN for moderate pain, Oxy 15 q4h PRN for severe pain, Dilaudid 1 mg q4h PRN when pain not relieved by PO meds. Continue lyrica 50 mg TID.  --Continue to monitor clinically, notify NSGY immediately with any changes in neuro status  --Drains: Keep, continue IV abx while in place. High  output, will switch to gravity.  --H&H 7.3 and 24.0 this am. Will transfuse 1u prbcs. Continue to monitor.  --DVT ppx: TEDs/SCDs/SQH  --Bowel regimen: Miralax, senna, lactulose 2/23. + BM.  --Activity: PT/OT OOB, TLSO brace when OOB    Discussed with Dr. Templeton.    Dispo: Likely OR on 3/2/22 for reinstrumentation.

## 2022-02-24 NOTE — PLAN OF CARE
"40 y/o F with PMH of cirrhosis, Hep C, pancytopenia, polysubstance abuse, and epidural abscess s/p L3-L4 laminectomy and L3-L5 TLIF (4/2021; blood and surgical cultures positive for group B strep) admitted from [facility] clinic with progressive lumbar spondylodiscitis and hardware failure. IM6 consulted for preop risk stratification and medical optimization.     Chart reviewed. S/p washout and hardware removal 2/21. Plan for reinstrumentation on 3/2.    Primary team planning to transfuse 1 u pRBC for Hgb 7.3 today.     - RCRI 0   - blood and biopsy cultures negative so far  - f/u surgical cultures, negative so far   - continue rocephin per ID recs, PICC placed   - daily CBC, CMP, INR   - will need outpatient hepatology referral    - recommend placement on discharge for continued IV abx given hx of drug use and noncompliance with previous IV abx     Please secure chat [facility] Medicine M ("Medicine Consult Only") or contact me directly for any additional questions or concerns.    Cecille Rhodes MD  PGY1  Hospital Medicine Consult Service    "

## 2022-02-24 NOTE — PLAN OF CARE
[facility] - [facility]surgery (Hospital)  Discharge Reassessment    Primary Care Provider: Dannielle Merino DO    Expected Discharge Date: 3/4/2022     Patient is not medically ready for discharge.  Patient has drain and wound vac in place.  Patient to go to surgery next week.  Rehab following for medical stability.    Reassessment (most recent)     Discharge Reassessment - 02/24/22 1011        Discharge Reassessment    Assessment Type Discharge Planning Reassessment     Did the patient's condition or plan change since previous assessment? Yes     Discharge Plan discussed with: Patient     Communicated SHIRA with patient/caregiver Date not available/Unable to determine     Discharge Plan A Rehab     Discharge Plan B Home with family     DME Needed Upon Discharge  none     Discharge Barriers Identified None     Why the patient remains in the hospital Requires continued medical care        Post-Acute Status    Post-Acute Authorization Placement     Post-Acute Placement Status Pending medical clearance/testing     Discharge Delays None known at this time

## 2022-02-24 NOTE — PROGRESS NOTES
[facility]surgery ([facility])  Neurosurgery  Progress Note    Subjective:     History of Present Illness: Rachel Zazueta is a 41 y.o.  female with PMHx of Hepatitis C, liver cirrhosis, pancytopenia, and polysubstance abuse, who presents to clinic for follow up with new imaging s/p L3 and L4 laminectomy for evacuation of epidural abscess and L3-L5 TLIF on 04/16/2021.     The pt c/o worsening back and left leg pain since October. She states that she is no longer using IV drugs. States only using marijuana. Describes the left leg pain as a shock down the leg when standing for 10 minutes. Denies taking any antibiotics. Endorses new weakness in the legs. Denies b/b dysfunction. Denies new neck pain. She ambulates with a walker at home. States that she smoke.    She presents to ED as direct admit from clinic.       Post-Op Info:  Procedure(s) (LRB):  REMOVAL, HARDWARE, SPINE (N/A)   3 Days Post-Op     Interval History: NAEON. Afebrile. Hypotensive this am to 98/54. Other vitals wnl. H&H 7.3 & 24.0. HV drains with 290 & 170 cc output, will put to gravity. Patient denies sob, lightheadedness, CP, lethargy. Will transfuse 1u prbcs today. Repeat type&screen ordered. She reports continued back pain that radiates down her LLE. Denies new numbness or weakness. Voiding spontaneously and has had a BM.     Medications:  Continuous Infusions:  Scheduled Meds:   acetaminophen  1,000 mg Oral Q8H    cefTRIAXone (ROCEPHIN) IVPB  2 g Intravenous Q12H    heparin (porcine)  5,000 Units Subcutaneous Q8H    pantoprazole  40 mg Oral Daily    polyethylene glycol  17 g Oral Daily    pregabalin  50 mg Oral TID    senna-docusate 8.6-50 mg  1 tablet Oral Daily    sertraline  50 mg Oral Daily    sodium chloride 0.9%  10 mL Intravenous Q6H     PRN Meds:sodium chloride, aluminum-magnesium hydroxide-simethicone, dextrose 10%, dextrose 10%, glucagon (human recombinant), glucose, glucose, HYDROmorphone, melatonin, oxyCODONE, oxyCODONE,  Flushing PICC Protocol **AND** sodium chloride 0.9% **AND** sodium chloride 0.9%       Objective:     Weight: 133.8 kg (294 lb 15.6 oz)  Body mass index is 46.2 kg/m².  Vital Signs (Most Recent):  Temp: 98.1 °F (36.7 °C) (02/24/22 0728)  Pulse: 97 (02/24/22 0728)  Resp: 15 (02/24/22 0844)  BP: (!) 98/54 (02/24/22 0728)  SpO2: 95 % (02/24/22 0728)   Vital Signs (24h Range):  Temp:  [96.7 °F (35.9 °C)-98.8 °F (37.1 °C)] 98.1 °F (36.7 °C)  Pulse:  [] 97  Resp:  [13-20] 15  SpO2:  [93 %-98 %] 95 %  BP: ()/(53-58) 98/54     Date 02/24/22 0700 - 02/25/22 0659   Shift 3643-9158 2083-9340 8304-5866 24 Hour Total   INTAKE   Shift Total(mL/kg)       OUTPUT   Drains 115   115   Shift Total(mL/kg) 115(0.9)   115(0.9)   Weight (kg) 133.8 133.8 133.8 133.8                        Closed/Suction Drain 02/21/22 1644 Left;Medial Back Accordion 10 Fr. (Active)   Site Description Unable to view 02/23/22 1800   Dressing Type Transparent (Tegaderm) 02/23/22 1800   Dressing Status Clean;Dry;Intact 02/23/22 1800   Dressing Intervention Integrity maintained 02/23/22 1800   Drainage Dark red 02/23/22 1800   Status Other (Comment) 02/23/22 1800   Output (mL) 115 mL 02/24/22 0800            Closed/Suction Drain 02/21/22 1645 Right;Medial Back Accordion 10 Fr. (Active)   Site Description Unable to view 02/23/22 1800   Dressing Type Transparent (Tegaderm) 02/23/22 1800   Dressing Status Clean;Dry;Intact 02/23/22 1800   Dressing Intervention Integrity maintained 02/23/22 1800   Drainage Dark red 02/23/22 1800   Status Other (Comment) 02/23/22 1800   Output (mL) 40 mL 02/24/22 0602       Female External Urinary Catheter 02/22/22 2300 (Active)   Skin no redness;no breakdown 02/24/22 0800   Tolerance signs/symptoms of discomfort 02/24/22 0800   Date of last wick change 02/24/22 02/24/22 0800   Time of last wick change 1057 02/24/22 0800   Output (mL) 800 mL 02/23/22 0800     Neurosurgery Physical Exam  General: Well developed, well  nourished, not in acute distress.   Head: Normocephalic, atraumatic.  Neck: Full ROM.   Neurologic: Alert and oriented x 4. Thought content appropriate.  GCS: Motor:6  Verbal:5  Eyes:4   GCS Total: 15  Language: No aphasia.  Speech: No dysarthria.  Cranial nerves: Face symmetric, tongue midline, CN II-XII grossly intact.   Eyes: Pupils equal, round, reactive to light with accomodation, EOMI.   Pulmonary: Normal respirations, no signs of respiratory distress.  Abdomen: Soft, non-distended, non-tender to palpation.  Sensory: Intact to light touch throughout.  Motor Strength: Moves all extremities spontaneously with good tone. No abnormal movements seen.     Strength  Deltoids Triceps Biceps Wrist Extension Wrist Flexion Hand    Upper: R 5/5 5/5 5/5 5/5 5/5 5/5    L 5/5 5/5 5/5 5/5 5/5 5/5     Hip Flexion Knee Extension Knee  Flexion Dorsiflexion Plantar flexion EHL   Lower: R 5/5 5/5 5/5 5/5 5/5 5/5    L 4/5 4/5 4/5 5/5 5/5 5/5     Parker: Absent.  Clonus: 3 beats bilaterally.  Skin: Skin is warm, dry and intact.    Incision c/d/I with WV in place with good seal. No surrounding erythema or edema. No drainage from incision. No palpable hematoma or underlying fluid collection.  HV drains in place.    Straight leg raise: Negative  Gait: Normal, tandem gait normal, able to walk on heels & toes.  Cervical ROM: Full.  Lumbar ROM: Full.   SI Joint tenderness: Negative.     Greater trochanter TTP: Negative.  Tenderness with external/internal hip rotation: Negative.    Significant Labs:  Recent Labs   Lab 02/22/22  1318 02/23/22  0652 02/24/22  0855   GLU  --  111* 115*   * 136 135*   K  --  3.8 3.8   CL  --  103 103   CO2  --  28 28   BUN  --  13 15   CREATININE  --  0.6 0.6   CALCIUM  --  7.9* 8.2*     Recent Labs   Lab 02/23/22  0652 02/24/22  0855   WBC 8.67 5.35   HGB 8.0* 7.3*   HCT 25.4* 24.0*   * 101*     Recent Labs   Lab 02/23/22  0652 02/24/22  0855   INR 1.3* 1.3*     Microbiology Results (last  7 days)       Procedure Component Value Units Date/Time    Aerobic culture [[id number]] Collected: 02/21/22 1539    Order Status: Completed Specimen: Abscess from Back Updated: 02/24/22 1124     Aerobic Bacterial Culture No growth    Narrative:      Epidural purulence #2    Culture, Anaerobe [[id number]] Collected: 02/21/22 1539    Order Status: Completed Specimen: Abscess from Back Updated: 02/23/22 0836     Anaerobic Culture Culture in progress    Narrative:      Epidural purulence #2    Culture, Anaerobe [[id number]] Collected: 02/21/22 1539    Order Status: Completed Specimen: Abscess from Back Updated: 02/23/22 0835     Anaerobic Culture Culture in progress    Narrative:      Epidural purulence #1    Aerobic culture [[id number]] Collected: 02/21/22 1539    Order Status: Completed Specimen: Abscess from Back Updated: 02/23/22 0643     Aerobic Bacterial Culture No growth    Narrative:      Epidural purulence #1    AFB Culture & Smear [[id number]] Collected: 02/21/22 1539    Order Status: Completed Specimen: Abscess from Back Updated: 02/22/22 2127     AFB Culture & Smear Culture in progress     AFB CULTURE STAIN No acid fast bacilli seen.    Narrative:      Epidural purulence #1    AFB Culture & Smear [[id number]] Collected: 02/21/22 1539    Order Status: Completed Specimen: Abscess from Back Updated: 02/22/22 2127     AFB Culture & Smear Culture in progress     AFB CULTURE STAIN No acid fast bacilli seen.    Narrative:      Epidural purulence #2    Culture, Anaerobic [[id number]] Collected: 02/15/22 1128    Order Status: Completed Specimen: Biopsy from Back Updated: 02/22/22 0656     Anaerobic Culture No anaerobes isolated    Narrative:      L3-4 / L4-5 osteodiscitis    Gram stain [[id number]] Collected: 02/21/22 1539    Order Status: Completed Specimen: Abscess from Back Updated: 02/21/22 1753     Gram Stain Result No organisms seen      Few WBC's    Narrative:      Epidural purulence #2    Gram stain [[id number]]  Collected: 02/21/22 1539    Order Status: Completed Specimen: Abscess from Back Updated: 02/21/22 1751     Gram Stain Result No organisms seen      Few WBC's    Narrative:      Epidural purulence #1    Fungus culture [[id number]] Collected: 02/21/22 1539    Order Status: Sent Specimen: Abscess from Back Updated: 02/21/22 1657    Fungus culture [[id number]] Collected: 02/21/22 1539    Order Status: Sent Specimen: Abscess from Back Updated: 02/21/22 1656    Fungus culture [[id number]] Collected: 02/15/22 1128    Order Status: Completed Specimen: Biopsy from Back Updated: 02/21/22 1150     Fungus (Mycology) Culture Culture in progress    Narrative:      L3-4 / L4-5 osteodiscitis    Blood Culture #2 **CANNOT BE ORDERED STAT** [[id number]] Collected: 02/14/22 1820    Order Status: Completed Specimen: Blood from Peripheral, Antecubital, Right Updated: 02/19/22 2012     Blood Culture, Routine No growth after 5 days.    Blood Culture #1 **CANNOT BE ORDERED STAT** [[id number]] Collected: 02/14/22 1820    Order Status: Completed Specimen: Blood from Peripheral, Forearm, Right Updated: 02/19/22 2012     Blood Culture, Routine No growth after 5 days.    Aerobic culture [[id number]] Collected: 02/15/22 1128    Order Status: Completed Specimen: Biopsy from Back Updated: 02/19/22 1030     Aerobic Bacterial Culture No growth    Narrative:      L3-4 / L4-5 osteodiscitis          All pertinent labs from the last 24 hours have been reviewed.    Significant Diagnostics:  I have reviewed and interpreted all pertinent imaging results/findings within the past 24 hours.    Assessment/Plan:     * Spondylodiscitis  Rachel Zazueta is a 41 F with PMH hepatitis C, cirrhosis, pancytopenia, nicotine abuse, hx IVDU (last use 2 years ago), strep agalactae bacteremia, s/p L3-5 TLIF on 4/16/2021 who presents with persistent and progressive lumbar spondylodiscitis with resultant hardware failure and new scoliosis.     S/p hardware removal on  2/21.    --Patient admitted to NPU on telemetry.      -q4h neurochecks on floor.   --Xray with TLSO brace in place.  --XR lumbar spine flex/ext completed.   --All labs and diagnostics personally reviewed  --Follow-up MRI Brain, C/T/L w/wo contrast:   -No evidence of osteomyelitis, discitis, or epidural abscess within the cervical or thoracic spine   -No diffusion positive or abnormal enhancement within the brain   -MRI brain shows patchy nonspecific increased T2 and FLAIR signal in the periventricular and subcortical white matter bilaterally. Could be due to chronic microvascular ischemic changes vs demyelinating disease given patient's age. No evidence of active demyelination.   -DDD C4-7 with mild to moderate canal stenosis  --Full infectious w/u.   -ESR 57, CRP 29, Procal 0.05, Lactate 1.1, BCx 2/14 NGTD, UA/Ucx pending. Wound Cx pending.   -F/u ID recs - now on Rocephin. PICC placed.   --S/p L3-4 disc biopsy and L3 bone biopsy with IR 2/15 - Gram stain rare WBC. Cx NGTD.  -- consult for risk stratification and medical optimization. Appreciate assistance. RCRI 0, 3.9% risk of perioperative cardiac complication.   --Utox + for amphetamines, addiction psych consulted per .   --Hold anti-plt/coag medications.  --Monitor for urinary retention - voiding spontaneously. Bladder scan prn.  --Pain control: Oxy 10 q4h PRN for moderate pain, Oxy 15 q4h PRN for severe pain, Dilaudid 1 mg q4h PRN when pain not relieved by PO meds. Continue lyrica 50 mg TID.  --Continue to monitor clinically, notify NSGY immediately with any changes in neuro status  --Drains: Keep, continue IV abx while in place. High output, will switch to gravity.  --H&H 7.3 and 24.0 this am. Will transfuse 1u prbcs. Continue to monitor.  --DVT ppx: TEDs/SCDs/SQH  --Bowel regimen: Miralax, senna, lactulose 2/23. + BM.  --Activity: PT/OT OOB, TLSO brace when OOB    Discussed with Dr. Templeton.    Dispo: Likely OR on 3/2/22 for  reinstrumentation.        Afua Campbell PA-C  Neurosurgery  [facility] - [facility] ([facility])

## 2022-02-24 NOTE — PT/OT/SLP PROGRESS
Occupational Therapy   Treatment    Name: Rachel Zazueta  MRN: [id number]  Admitting Diagnosis:  Spondylodiscitis  3 Days Post-Op    Recommendations:     Discharge Recommendations: rehabilitation facility  Discharge Equipment Recommendations:  other (see comments) (TBD at this time.)  Barriers to discharge:  Inaccessible home environment, Other (Comment) (Decreased Mobility)    Assessment:     Rachel Zazueta is a 41 y.o. female with a medical diagnosis of Spondylodiscitis.  She presents with decreased ADL engagement and the following performance deficits affecting function: weakness, impaired endurance, impaired functional mobilty, gait instability, impaired balance, decreased lower extremity function, decreased safety awareness, decreased upper extremity function, pain, orthopedic precautions, decreased coordination.     Pt supine in bed upon arrival ready to engage in skilled OT services. Pt engaged in bed mobility, functional mobility for sit<>stand using RW for ~4 steps , and UBD donning TLSO and gown, and set-up for oral hygiene. Pt limited during functional mobility, bed mobility, and ADL engagement secondary to pain.     Rehab Prognosis:  Fair; patient would benefit from acute skilled OT services to address these deficits and reach maximum level of function.       Plan:     Patient to be seen 3 x/week to address the above listed problems via self-care/home management, therapeutic activities, therapeutic exercises, neuromuscular re-education  · Plan of Care Expires: 03/21/22  · Plan of Care Reviewed with: patient    Subjective     Pain/Comfort:  · Pain Rating 1: 8/10  · Location - Side 1: Left  · Location - Orientation 1: upper  · Location 1: leg  · Pain Addressed 1: Pre-medicate for activity, Nurse notified, Reposition  · Pain Rating Post-Intervention 1: 5/10    Objective:     Communicated with: RN prior to session.  Patient found supine with wound vac, hemovac, telemetry, pulse ox (continuous), bed  alarm, blood pressure cuff, gu catheter, peripheral IV, TLSO upon OT entry to room.    General Precautions: Standard, fall   Orthopedic Precautions:spinal precautions   Braces: TLSO  Respiratory Status: Room air     Occupational Performance:     Bed Mobility:    · Patient completed Rolling/Turning to Left with  minimum assistance and moderate assistance  · Patient completed Scooting/Bridging with minimum assistance to moderate assistance  · Patient completed Supine to Sit with minimum assistance to moderate assistance     Functional Mobility/Transfers:  · Patient completed Sit <> Stand Transfer with Min to Mod A for first attempt, and CGA for 2nd attempt from EOB  with  rolling walker   · Patient completed Bed <> Chair Transfer using Step Transfer technique with contact guard assistance and minimum assistance with rolling walker  · Functional Mobility: Pt limited during functional mobility due to pain during session. Pt required positive reinforcement and education on log rolling to complete bed mobility and functional mobility safely with blocking at knees during sit<>stand tranfers.    Activities of Daily Living:  · Grooming: modified independence for oral hygiene  · Upper Body Dressing: maximal assistance donning gown and TLSO  · Toileting: supervision to remove Pure Wick   · Bedside commode recommended and ordered for room and pt preferred removal of pure wick.       [facility] 6 Click ADL: 14    Treatment & Education:  -OT POC, safety during ADLs and mobility   -Education on energy conservation and task modification to maximize safety and independence  -Questions answered within OT scope of practice.      Patient left up in chair with all lines intact, call button in reach, chair alarm off and RN notifiedEducation:      GOALS:   Multidisciplinary Problems     Occupational Therapy Goals        Problem: Occupational Therapy Goal    Goal Priority Disciplines Outcome Interventions   Occupational Therapy Goal     OT,  PT/OT Ongoing, Progressing    Description: Goals to be met by: 3/8/2022     Patient will increase functional independence with ADLs by performing:    Feeding with [place].  UE Dressing with Stand-by Assistance.  LE Dressing with Minimal Assistance.  Grooming while standing at sink with Set-up Assistance.  Toileting from toilet with Stand-by Assistance for hygiene and clothing management.   Toilet transfer to toilet with Contact Guard Assistance.  Pt able to verbalize and adhere to all spinal precautions 100% of the time.  Pt can don/doff TLSO brace stand-by assist                       Time Tracking:     OT Date of Treatment: 02/24/22  OT Start Time: 0921  OT Stop Time: 0957  OT Total Time (min): 36 min    Billable Minutes:Self Care/Home Management 10 min   Therapeutic Activity 26 min    OT/ROSS: OT          2/24/2022  Alex Gonzales OT

## 2022-02-24 NOTE — PROGRESS NOTES
[facility] - Neuro[facility])  Infectious Disease  Progress Note    Patient Name: Rachel Zazueta  MRN: [id number]  Admission Date: 2/14/2022  Length of Stay: 10 days  Attending Physician: Guille Templeton MD  Primary Care Provider: Dannielle Merino DO    Isolation Status: No active isolations  Assessment/Plan:      * Spondylodiscitis  41 year old female with remote hx of IVDU, GBS bacteremia, L3-4 osteodiscitis & epidural abscess s/p washout and fusion 4/2021 who presented to NSGY clinic with worsening back pain since October and admitted after imaging showed worsened L3-4 osteodiscitis with extension to L4-5 along with hardware loosening.    Patient underwent L3-4 intervertebral disc biopsy on 2/15. She is s/p hardware removal on 2/21 though cages retained L3-4 and L4-5.  Patient was on empiric Vancomycin and Ceftriaxone - now rocephin. IR/OR cultures and blood cx are NGTD. Afebrile. HDS.  On high dose ceftriaxone given cultures from epidural space. Stable non septic    Per NSGY patient to stay until re-instrumentation on 3/2    Recommendations  · Continue empiric Ceftriaxone 2g IV q12h  · Fu cultures  · Redo fusion on 3/2/22 - please send additional CXs  · Anticipate 8 weeks of IV antibiotics from day of surgery  · Will sign off for now.   Reconsult ID on 3/2 at next surgery or sooner if Cxs turn positive  · Discussed antibiotic plan with ID staff. ID will sign off.     Chronic hepatitis C with cirrhosis    History of HCV. Needs Hepatology follow up after discharge for further evaluation and treatment.        Anticipated Disposition: tbd    Thank you for your consult. I will sign off. Please contact us if you have any additional questions.    ROSA ELENA Marinelli  Infectious Disease  [facility])    Subjective:     Principal Problem:Spondylodiscitis    HPI: Rachel Zazueta is a 41 year old female with HCV, liver, cirrhosis, anemia, and polysubstance abuse admitted in April 2021  with GBS bacteremia and L3-4 spondylodiscitis with associated epidural abscess s/p posterior spinal fusion and evacuation of epidural abscess on 4/16.  She was treated with Ceftriaxone with a plan to complete 8 weeks of antibiotics (end date 6/11/21). Initially at [facility] and  discharged from Rehab with a PICC line to finish antibiotics at home. Patient reports only completing 4-5 weeks of IV antibiotics as her PICC line accidentally fell at at home. She did not inform ID or go to the ED for replacement as her back pain had resolved and she felt well. She missed her last ID appt.    In October her back pain returned and radiates down her left leg. She was seen in NSGY 2/14 for this.  CT ordered reviewed and showed increased destructive endplate changes at L3-4, new destructive endplate changes at L4-5, and worsening alignment abnormalities, with adjacent soft tissue edema and lymphadenopathy, highly concerning for discitis osteomyelitis. New lucencies at the bone-metal interfaces of all pedicle screws and both interbody spacers, concerning for loosening. Patient was admitted for further evaluation and treatment.    Patient afebrile without a leukocytosis. Sed rate 57/CRP 29. She stated that she is no longer using IV drugs and only using marijuana.  Denies recent abx use.    MRI C/T/L spine - Suspected discitis/osteomyelitis of the lumbar spine at L3-4 and L4-5 with postoperative changes of posterior fusion from L3 through L5 with probable hardware infection.  IR consulted and performed L3-4 intervertebral disc biopsy. Cx are NGTD.  ID consulted to abx recs       Interval History:   No AEON.  Afebrile and WBC WNL.  S/P Hardware removal  Per op note, purulence seen.  Co back pain  IR and OR Cx NGTD  The patient denies any recent fever, chills, or sweats.      Review of Systems   Constitutional:  Negative for activity change, chills, diaphoresis and fever.   Respiratory:  Negative for cough, shortness of breath  and wheezing.    Cardiovascular:  Negative for chest pain.   Gastrointestinal:  Negative for abdominal pain, constipation, diarrhea, nausea and vomiting.   Genitourinary:  Negative for dysuria, frequency and urgency.   Musculoskeletal:  Positive for back pain.   Skin:  Positive for wound.   Neurological:  Negative for dizziness.   Hematological:  Does not bruise/bleed easily.   Objective:     Vital Signs (Most Recent):  Temp: 98.1 °F (36.7 °C) (02/24/22 0728)  Pulse: 97 (02/24/22 0728)  Resp: 15 (02/24/22 0844)  BP: (!) 98/54 (02/24/22 0728)  SpO2: 95 % (02/24/22 0728) Vital Signs (24h Range):  Temp:  [96.7 °F (35.9 °C)-98.8 °F (37.1 °C)] 98.1 °F (36.7 °C)  Pulse:  [] 97  Resp:  [13-20] 15  SpO2:  [93 %-98 %] 95 %  BP: ()/(53-58) 98/54     Weight: 133.8 kg (294 lb 15.6 oz)  Body mass index is 46.2 kg/m².    Estimated Creatinine Clearance: 176.3 mL/min (based on SCr of 0.6 mg/dL).    Physical Exam  Constitutional:       General: She is not in acute distress.     Appearance: She is well-developed. She is not diaphoretic.   HENT:      Head: Normocephalic and atraumatic.   Cardiovascular:      Rate and Rhythm: Normal rate and regular rhythm.      Heart sounds: Normal heart sounds. No murmur heard.    No friction rub. No gallop.   Pulmonary:      Effort: Pulmonary effort is normal. No respiratory distress.      Breath sounds: Normal breath sounds. No wheezing or rales.      Comments: Anterior exam as patient with back pain  Abdominal:      General: Bowel sounds are normal. There is no distension.      Palpations: Abdomen is soft. There is no mass.      Tenderness: There is no abdominal tenderness. There is no guarding or rebound.   Skin:     General: Skin is warm and dry.   Neurological:      Mental Status: She is alert and oriented to person, place, and time.   Psychiatric:         Behavior: Behavior normal.       Significant Labs: Blood Culture:   Recent Labs   Lab 02/14/22  1820   LABBLOO No growth after 5  days.  No growth after 5 days.       CBC:   Recent Labs   Lab 02/23/22  0652 02/24/22  0855   WBC 8.67 5.35   HGB 8.0* 7.3*   HCT 25.4* 24.0*   * 101*       CMP:   Recent Labs   Lab 02/22/22  1318 02/23/22  0652 02/24/22  0855   * 136 135*   K  --  3.8 3.8   CL  --  103 103   CO2  --  28 28   GLU  --  111* 115*   BUN  --  13 15   CREATININE  --  0.6 0.6   CALCIUM  --  7.9* 8.2*   PROT  --  6.3 5.8*   ALBUMIN  --  2.1* 2.0*   BILITOT  --  1.5* 1.2*   ALKPHOS  --  83 88   AST  --  49* 44*   ALT  --  20 22   ANIONGAP  --  5* 4*   EGFRNONAA  --  >60.0 >60.0       Wound Culture:   Recent Labs   Lab 02/15/22  1128 02/21/22  1539   LABAERO No growth No growth  No growth       All pertinent labs within the past 24 hours have been reviewed.    Significant Imaging: I have reviewed all pertinent imaging results/findings within the past 24 hours.      X-Ray Chest 1 View S/P PICC Line by Nursing [[id number]] Resulted: 02/22/22 1403   Order Status: Completed Updated: 02/22/22 1405   Narrative:     EXAMINATION:   XR CHEST 1 VIEW S/P PICC LINE BY NURSING     CLINICAL HISTORY:   PICC PLACEMENT;     TECHNIQUE:   Frontal chest radiograph     COMPARISON:   Chest radiograph 05/02/2021     FINDINGS:   Monitoring leads overlie the chest.  Patient is rotated.     Right-sided PICC line tip appears to extend at least to the mid to distal SVC level, noting the distal aspect is somewhat obscured by overlying monitoring leads.  Cardiomediastinal silhouette is midline and within normal limits.  The lungs are well expanded and grossly clear without pneumothorax.  Otherwise grossly stable chest.    Impression:       As above.       Electronically signed by: Ammon Diane MD   Date: 02/22/2022   Time: 14:03   X-Ray Lumbar Spine Ap And Lateral [[id number]] Resulted: 02/22/22 1241   Order Status: Completed Updated: 02/22/22 1244   Narrative:     EXAMINATION:   XR LUMBAR SPINE AP AND LATERAL     CLINICAL HISTORY:   To be done standing  while TLSO is on.;     FINDINGS:   There is a dextroconvex curvature of the lumbar spine.  There are disc implants at L3-L4 and L4-L5.  The pedicle screws and fixation rods a been removed.  There are antibiotic beads packed along the posterior elements.  There is severe flattening and destruction of L4.  There is DJD.    Impression:       Postsurgical changes as above.       Electronically signed by: Toi Beyer MD   Date: 02/22/2022   Time: 12:41     SURG FL Surgery Fluoro Usage [[id number]] Resulted: 02/21/22 1642   Order Status: Completed Updated: 02/21/22 1642   Narrative:     See OP Notes for results.     IMPRESSION: See OP Notes for results.             This procedure was auto-finalized by: [facility]   X-Ray Scoliosis Complete [[id number]] Resulted: 02/20/22 1036   Order Status: Completed Updated: 02/20/22 1039   Narrative:     EXAMINATION:   XR SCOLIOSIS COMPLETE     CLINICAL HISTORY:   pre-op planning;     TECHNIQUE:   AP and lateral views of the entire spine     COMPARISON:   02/15/2022     FINDINGS:   there is 12 degrees of convex right scoliosis as measured from the superior endplate of T2 to the inferior endplate of T6. There is 25 degrees of convex right scoliosis as measured from the superior endplate of L1 to the inferior endplate of L5.  Postsurgical changes are again seen of a posterior L3 through L5 fusion with interbody spacer placements.  The loosening described on prior imaging is not as well seen on this exam.  There appears to be further destruction of the L4 vertebral body when compared to 02/15/2022 on the lateral view that is not as well seen on the frontal view.  The frontal view however shows destructive endplate changes at L3-L4 and L4-L5 appear unchanged from 02/15/2022.     The remainder of the vertebral body heights are maintained.  Degenerative changes, moderate, are seen at C3-C4, C4-C5 and C5-C6.  Soft tissues are unremarkable.    Impression:       Destructive endplate  changes at L3-L4 and L4-L5 that are concerning for discitis osteomyelitis. On the lateral view, there appears to be further destruction of the L4 vertebral body when compared to 02/15/2022 that is not borne out on the AP view.  Cross-sectional imaging could be considered for further evaluation.       Electronically signed by: Hoa Barclay   Date: 02/20/2022   Time: 10:36   X-Ray Lumbar Complete Including Flex And Ext [[id number]] Resulted: 02/16/22 0752   Order Status: Completed Updated: 02/16/22 0754   Narrative:     EXAMINATION:   XR LUMBAR SPINE 5 VIEW WITH FLEX AND EXT     CLINICAL HISTORY:   evaluate spinal instability; L3-4, L4-5 TLIF with hardware failure in setting of spondylodiscitis;     TECHNIQUE:   Five views of the lumbar spine plus flexion extension views were performed.     COMPARISON:   Lumbar spine exam December 31, 2021, CT of lumbar spine February 14, 2022     FINDINGS:   Stable 35 degree or so dextro rotary scoliosis of lower thoracic and lumbar spine.  No acute fractures with preserved vertebral body heights.  In reconfirmed findings of bilateral posterior hardware instrumented fusion in of L3, L4, and L5 vertebral segments with inter body spacers.  As was better seen on earlier CT, there is paralleling lucency  at the bone metal interfaces of all pedicle screws of concern for hardware loosening.  No fragmented hardware.  The CT demonstrated increased destructive endplate changes at L3-L4 and new destructive endplate changes at L4-L5 concerning for discitis-osteomyelitis reconfirmed on the standard images but better defined on CT.  Grade 1 anterolisthesis of L4 with respect to L3 and L5.  No definite instability based on flexion and extension views.  Flexion and extension views demonstrate no definite instability.    Impression:       As above.       Electronically signed by: Brandan Aguilar   Date: 02/16/2022   Time: 07:52   US Abdomen Limited with Doppler (xpd) [[id number]] Resulted: 02/15/22  1859   Order Status: Completed Updated: 02/15/22 1902   Narrative:     EXAMINATION:   US ABDOMEN LIMITED WITH DOPPLER (XPD)     CLINICAL HISTORY:   cirrhosis;     TECHNIQUE:   Complete abdominal ultrasound with doppler.  Color and spectral Doppler were performed.     COMPARISON:   CT abdomen pelvis 07/27/2021.     FINDINGS:   The visualized portion of the pancreas demonstrates 1.6 x 0.7 x 1.1 cm peripancreatic lymph node.     The liver is normal in size measuring 13 cm.  The liver demonstrates heterogeneous echotexture no focal hepatic lesions are seen.     The gallbladder is surgically absent.  The common duct is not dilated, measuring 3 mm.  No dilated intrahepatic radicles are seen.     The spleen is enlarged in size measuring 20.6 x 10.7 cm with a homogeneous echotexture.     The aorta tapers normally.     The kidneys are normal in size without focal abnormality or evidence of hydronephrosis.     There is no evidence of ascites.     The main portal vein, right portal vein, left portal vein, middle hepatic vein, right hepatic vein, left hepatic vein, SMV, and IVC are patent with proper directional flow.  The main hepatic artery is patent. The umbilical vein is patent.    Impression:       Hepatic cirrhosis and portal hypertension with satisfactory Doppler evaluation of the liver.     Enlarged peripancreatic lymph node.     Electronically signed by resident: Nuha Grubbs   Date: 02/15/2022   Time: 18:27     Electronically signed by: Yovany Beatty MD   Date: 02/15/2022   Time: 18:59   IR Biopsy Bone deep [[id number]] Resulted: 02/15/22 1322   Order Status: Completed Updated: 02/15/22 1325   Narrative:     EXAMINATION:   IR BIOPSY BONE/DISC DEEP     CLINICAL HISTORY:   L3-4 / L4-5 osteodiscitis;     41 y.o. female with medical history of Hepatitis C, liver cirrhosis, polysubstance abuse, L3 and L4 laminectomy - with admission concerns for back pain. MRI spine revealing discitis/osteomyelitis of the lumbar spine at  L3-4 and L4-5. Hence plans for fluroscopy guided L3-4 intervertebral disc biopsy.     TECHNIQUE:   Informed consent was obtained.  Biplane fluoroscopy was used.  Conscious intravenous sedation was provided by an independent radiology nurse who used continuous physiologic monitoring, and the time of the sedation was 20 minutes.     Using usual sterile technique, lidocaine local anesthesia and biplane fluoroscopy guidance, a biopsy procedure was performed via right posterolateral, para pedicular approach.  ClickTale VT Bone Biopsy Device 13Ga was used to obtain samples from the L3-L4 intervertebral disc and L3 inferior endplate.     Samples were sent to the lab.  There were no complications.  The patient tolerated the procedure well and was transferred to the recovery room after the biopsy procedure.  Images and report were permanently recorded.  Fluoroscopy dose was 2634.2 uGycm2.  Reference air kerma was 228.3mGy.  Fluoroscopy time was 2.7 minutes     COMPARISON:   None    Impression:       Successful fluoroscopic guided biopsy of the L3-L4 intervertebral disc and L3 inferior endplate.     Electronically signed by resident: Tomy Frey   Date: 02/15/2022   Time: 12:48     Electronically signed by: Guille Gimenez MD   Date: 02/15/2022   Time: 13:22       Imaging History    2022  Date Procedure Name Status Accession Number Location   02/21/22 04:41 PM SURG FL Surgery Fluoro Usage Final [id number] [facility]   02/20/22 09:49 AM X-Ray Scoliosis Complete Final [id number] [facility]   02/15/22 05:58 PM US Abdomen Limited with Doppler (xpd) Final [id number] [facility]   02/15/22 05:21 PM X-Ray Lumbar Complete Including Flex And Ext Final [id number] [facility]   02/15/22 11:35 AM IR Biopsy Bone deep Final [id number] [facility]   02/14/22 10:43 PM MRI Spine Cervical-Thoracic-Lumbar W W/O Contrast (XPD) Final [id number] [facility]   02/14/22 10:42 PM MRI Brain W WO Contrast Final [id number] [facility]   02/14/22 01:11 PM CT Lumbar Spine Without Contrast Final [id number]  [facility]   02/15/22 10:00 AM Echo Final [id number] [facility]

## 2022-02-24 NOTE — SUBJECTIVE & OBJECTIVE
Interval History: NAEON. Afebrile. Hypotensive this am to 98/54. Other vitals wnl. H&H 7.3 & 24.0. HV drains with 290 & 170 cc output, will put to gravity. Patient denies sob, lightheadedness, CP, lethargy. Will transfuse 1u prbcs today. Repeat type&screen ordered. She reports continued back pain that radiates down her LLE. Denies new numbness or weakness. Voiding spontaneously and has had a BM.     Medications:  Continuous Infusions:  Scheduled Meds:   acetaminophen  1,000 mg Oral Q8H    cefTRIAXone (ROCEPHIN) IVPB  2 g Intravenous Q12H    heparin (porcine)  5,000 Units Subcutaneous Q8H    pantoprazole  40 mg Oral Daily    polyethylene glycol  17 g Oral Daily    pregabalin  50 mg Oral TID    senna-docusate 8.6-50 mg  1 tablet Oral Daily    sertraline  50 mg Oral Daily    sodium chloride 0.9%  10 mL Intravenous Q6H     PRN Meds:sodium chloride, aluminum-magnesium hydroxide-simethicone, dextrose 10%, dextrose 10%, glucagon (human recombinant), glucose, glucose, HYDROmorphone, melatonin, oxyCODONE, oxyCODONE, Flushing PICC Protocol **AND** sodium chloride 0.9% **AND** sodium chloride 0.9%       Objective:     Weight: 133.8 kg (294 lb 15.6 oz)  Body mass index is 46.2 kg/m².  Vital Signs (Most Recent):  Temp: 98.1 °F (36.7 °C) (02/24/22 0728)  Pulse: 97 (02/24/22 0728)  Resp: 15 (02/24/22 0844)  BP: (!) 98/54 (02/24/22 0728)  SpO2: 95 % (02/24/22 0728)   Vital Signs (24h Range):  Temp:  [96.7 °F (35.9 °C)-98.8 °F (37.1 °C)] 98.1 °F (36.7 °C)  Pulse:  [] 97  Resp:  [13-20] 15  SpO2:  [93 %-98 %] 95 %  BP: ()/(53-58) 98/54     Date 02/24/22 0700 - 02/25/22 0659   Shift 5827-6921 7059-3872 3841-4131 24 Hour Total   INTAKE   Shift Total(mL/kg)       OUTPUT   Drains 115   115   Shift Total(mL/kg) 115(0.9)   115(0.9)   Weight (kg) 133.8 133.8 133.8 133.8                        Closed/Suction Drain 02/21/22 1644 Left;Medial Back Accordion 10 Fr. (Active)   Site Description Unable to view 02/23/22 1800   Dressing  Type Transparent (Tegaderm) 02/23/22 1800   Dressing Status Clean;Dry;Intact 02/23/22 1800   Dressing Intervention Integrity maintained 02/23/22 1800   Drainage Dark red 02/23/22 1800   Status Other (Comment) 02/23/22 1800   Output (mL) 115 mL 02/24/22 0800            Closed/Suction Drain 02/21/22 1645 Right;Medial Back Accordion 10 Fr. (Active)   Site Description Unable to view 02/23/22 1800   Dressing Type Transparent (Tegaderm) 02/23/22 1800   Dressing Status Clean;Dry;Intact 02/23/22 1800   Dressing Intervention Integrity maintained 02/23/22 1800   Drainage Dark red 02/23/22 1800   Status Other (Comment) 02/23/22 1800   Output (mL) 40 mL 02/24/22 0602       Female External Urinary Catheter 02/22/22 2300 (Active)   Skin no redness;no breakdown 02/24/22 0800   Tolerance signs/symptoms of discomfort 02/24/22 0800   Date of last wick change 02/24/22 02/24/22 0800   Time of last wick change 1057 02/24/22 0800   Output (mL) 800 mL 02/23/22 0800     Neurosurgery Physical Exam  General: Well developed, well nourished, not in acute distress.   Head: Normocephalic, atraumatic.  Neck: Full ROM.   Neurologic: Alert and oriented x 4. Thought content appropriate.  GCS: Motor:6  Verbal:5  Eyes:4   GCS Total: 15  Language: No aphasia.  Speech: No dysarthria.  Cranial nerves: Face symmetric, tongue midline, CN II-XII grossly intact.   Eyes: Pupils equal, round, reactive to light with accomodation, EOMI.   Pulmonary: Normal respirations, no signs of respiratory distress.  Abdomen: Soft, non-distended, non-tender to palpation.  Sensory: Intact to light touch throughout.  Motor Strength: Moves all extremities spontaneously with good tone. No abnormal movements seen.     Strength  Deltoids Triceps Biceps Wrist Extension Wrist Flexion Hand    Upper: R 5/5 5/5 5/5 5/5 5/5 5/5    L 5/5 5/5 5/5 5/5 5/5 5/5     Hip Flexion Knee Extension Knee  Flexion Dorsiflexion Plantar flexion EHL   Lower: R 5/5 5/5 5/5 5/5 5/5 5/5    L 4/5 4/5  4/5 5/5 5/5 5/5     Parker: Absent.  Clonus: 3 beats bilaterally.  Skin: Skin is warm, dry and intact.    Incision c/d/I with WV in place with good seal. No surrounding erythema or edema. No drainage from incision. No palpable hematoma or underlying fluid collection.  HV drains in place.    Straight leg raise: Negative  Gait: Normal, tandem gait normal, able to walk on heels & toes.  Cervical ROM: Full.  Lumbar ROM: Full.   SI Joint tenderness: Negative.     Greater trochanter TTP: Negative.  Tenderness with external/internal hip rotation: Negative.    Significant Labs:  Recent Labs   Lab 02/22/22  1318 02/23/22  0652 02/24/22  0855   GLU  --  111* 115*   * 136 135*   K  --  3.8 3.8   CL  --  103 103   CO2  --  28 28   BUN  --  13 15   CREATININE  --  0.6 0.6   CALCIUM  --  7.9* 8.2*     Recent Labs   Lab 02/23/22  0652 02/24/22  0855   WBC 8.67 5.35   HGB 8.0* 7.3*   HCT 25.4* 24.0*   * 101*     Recent Labs   Lab 02/23/22  0652 02/24/22  0855   INR 1.3* 1.3*     Microbiology Results (last 7 days)       Procedure Component Value Units Date/Time    Aerobic culture [[id number]] Collected: 02/21/22 1539    Order Status: Completed Specimen: Abscess from Back Updated: 02/24/22 1124     Aerobic Bacterial Culture No growth    Narrative:      Epidural purulence #2    Culture, Anaerobe [[id number]] Collected: 02/21/22 1539    Order Status: Completed Specimen: Abscess from Back Updated: 02/23/22 0836     Anaerobic Culture Culture in progress    Narrative:      Epidural purulence #2    Culture, Anaerobe [[id number]] Collected: 02/21/22 1539    Order Status: Completed Specimen: Abscess from Back Updated: 02/23/22 0835     Anaerobic Culture Culture in progress    Narrative:      Epidural purulence #1    Aerobic culture [[id number]] Collected: 02/21/22 1539    Order Status: Completed Specimen: Abscess from Back Updated: 02/23/22 0643     Aerobic Bacterial Culture No growth    Narrative:      Epidural purulence #1    AFB  Culture & Smear [[id number]] Collected: 02/21/22 1539    Order Status: Completed Specimen: Abscess from Back Updated: 02/22/22 2127     AFB Culture & Smear Culture in progress     AFB CULTURE STAIN No acid fast bacilli seen.    Narrative:      Epidural purulence #1    AFB Culture & Smear [[id number]] Collected: 02/21/22 1539    Order Status: Completed Specimen: Abscess from Back Updated: 02/22/22 2127     AFB Culture & Smear Culture in progress     AFB CULTURE STAIN No acid fast bacilli seen.    Narrative:      Epidural purulence #2    Culture, Anaerobic [[id number]] Collected: 02/15/22 1128    Order Status: Completed Specimen: Biopsy from Back Updated: 02/22/22 0656     Anaerobic Culture No anaerobes isolated    Narrative:      L3-4 / L4-5 osteodiscitis    Gram stain [[id number]] Collected: 02/21/22 1539    Order Status: Completed Specimen: Abscess from Back Updated: 02/21/22 1753     Gram Stain Result No organisms seen      Few WBC's    Narrative:      Epidural purulence #2    Gram stain [[id number]] Collected: 02/21/22 1539    Order Status: Completed Specimen: Abscess from Back Updated: 02/21/22 1751     Gram Stain Result No organisms seen      Few WBC's    Narrative:      Epidural purulence #1    Fungus culture [[id number]] Collected: 02/21/22 1539    Order Status: Sent Specimen: Abscess from Back Updated: 02/21/22 1657    Fungus culture [[id number]] Collected: 02/21/22 1539    Order Status: Sent Specimen: Abscess from Back Updated: 02/21/22 1656    Fungus culture [[id number]] Collected: 02/15/22 1128    Order Status: Completed Specimen: Biopsy from Back Updated: 02/21/22 1150     Fungus (Mycology) Culture Culture in progress    Narrative:      L3-4 / L4-5 osteodiscitis    Blood Culture #2 **CANNOT BE ORDERED STAT** [[id number]] Collected: 02/14/22 1820    Order Status: Completed Specimen: Blood from Peripheral, Antecubital, Right Updated: 02/19/22 2012     Blood Culture, Routine No growth after 5 days.    Blood Culture  #1 **CANNOT BE ORDERED STAT** [[id number]] Collected: 02/14/22 1820    Order Status: Completed Specimen: Blood from Peripheral, Forearm, Right Updated: 02/19/22 2012     Blood Culture, Routine No growth after 5 days.    Aerobic culture [[id number]] Collected: 02/15/22 1128    Order Status: Completed Specimen: Biopsy from Back Updated: 02/19/22 1030     Aerobic Bacterial Culture No growth    Narrative:      L3-4 / L4-5 osteodiscitis          All pertinent labs from the last 24 hours have been reviewed.    Significant Diagnostics:  I have reviewed and interpreted all pertinent imaging results/findings within the past 24 hours.

## 2022-02-24 NOTE — ASSESSMENT & PLAN NOTE
History of HCV. Needs Hepatology follow up after discharge for further evaluation and treatment.

## 2022-02-24 NOTE — PLAN OF CARE
Problem: Fall Injury Risk  Goal: Absence of Fall and Fall-Related Injury  Outcome: Ongoing, Progressing     Problem: Infection  Goal: Absence of Infection Signs and Symptoms  Outcome: Ongoing, Progressing   Pt. Has gotten out of bed with assistance only to BSC pt. Tolerated well. Pt. Also getting antibiotics for infection. Pt. Also tolerating well

## 2022-02-24 NOTE — ASSESSMENT & PLAN NOTE
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    Patient underwent L3-4 intervertebral disc biopsy on 2/15. She is s/p hardware removal on 2/21 though cages retained L3-4 and L4-5.  Patient was on empiric Vancomycin and Ceftriaxone - now rocephin. IR/OR cultures and blood cx are NGTD. Afebrile. HDS.  On high dose ceftriaxone given cultures from epidural space. Stable non septic    Per NSGY patient to stay until re-instrumentation on 3/2    Recommendations  · Continue empiric Ceftriaxone 2g IV q12h  · Fu cultures  · Redo fusion on 3/2/22 - please send additional CXs  · Anticipate 8 weeks of IV antibiotics from day of surgery  · Will sign off for now.   Reconsult ID on 3/2 at next surgery or sooner if Cxs turn positive  · Discussed antibiotic plan with ID staff. ID will sign off.

## 2022-02-24 NOTE — ANESTHESIA POSTPROCEDURE EVALUATION
Anesthesia Post Evaluation    Patient: Rachel Zazueta    Procedure(s) Performed: Procedure(s) (LRB):  REMOVAL, HARDWARE, SPINE (N/A)    Final Anesthesia Type: general      Patient location during evaluation: PACU  Patient participation: Yes- Able to Participate  Level of consciousness: awake and alert and oriented  Pain management: adequate  Airway patency: patent    PONV status at discharge: No PONV  Anesthetic complications: no      Cardiovascular status: blood pressure returned to baseline and hemodynamically stable  Respiratory status: unassisted  Hydration status: euvolemic  Follow-up not needed.          Vitals Value Taken Time   BP 98/54 02/24/22 0728   Temp 36.7 °C (98.1 °F) 02/24/22 0728   Pulse 97 02/24/22 0728   Resp 18 02/24/22 0728   SpO2 95 % 02/24/22 0728         No case tracking events are documented in the log.      Pain/Chen Score: Pain Rating Prior to Med Admin: 8 (2/24/2022  5:23 AM)

## 2022-02-24 NOTE — SUBJECTIVE & OBJECTIVE
Interval History:   No AEON.  Afebrile and WBC WNL.  S/P Hardware removal  Per op note, purulence seen.  Co back pain  IR and OR Cx NGTD  The patient denies any recent fever, chills, or sweats.      Review of Systems   Constitutional:  Negative for activity change, chills, diaphoresis and fever.   Respiratory:  Negative for cough, shortness of breath and wheezing.    Cardiovascular:  Negative for chest pain.   Gastrointestinal:  Negative for abdominal pain, constipation, diarrhea, nausea and vomiting.   Genitourinary:  Negative for dysuria, frequency and urgency.   Musculoskeletal:  Positive for back pain.   Skin:  Positive for wound.   Neurological:  Negative for dizziness.   Hematological:  Does not bruise/bleed easily.   Objective:     Vital Signs (Most Recent):  Temp: 98.1 °F (36.7 °C) (02/24/22 0728)  Pulse: 97 (02/24/22 0728)  Resp: 15 (02/24/22 0844)  BP: (!) 98/54 (02/24/22 0728)  SpO2: 95 % (02/24/22 0728) Vital Signs (24h Range):  Temp:  [96.7 °F (35.9 °C)-98.8 °F (37.1 °C)] 98.1 °F (36.7 °C)  Pulse:  [] 97  Resp:  [13-20] 15  SpO2:  [93 %-98 %] 95 %  BP: ()/(53-58) 98/54     Weight: 133.8 kg (294 lb 15.6 oz)  Body mass index is 46.2 kg/m².    Estimated Creatinine Clearance: 176.3 mL/min (based on SCr of 0.6 mg/dL).    Physical Exam  Constitutional:       General: She is not in acute distress.     Appearance: She is well-developed. She is not diaphoretic.   HENT:      Head: Normocephalic and atraumatic.   Cardiovascular:      Rate and Rhythm: Normal rate and regular rhythm.      Heart sounds: Normal heart sounds. No murmur heard.    No friction rub. No gallop.   Pulmonary:      Effort: Pulmonary effort is normal. No respiratory distress.      Breath sounds: Normal breath sounds. No wheezing or rales.      Comments: Anterior exam as patient with back pain  Abdominal:      General: Bowel sounds are normal. There is no distension.      Palpations: Abdomen is soft. There is no mass.       Tenderness: There is no abdominal tenderness. There is no guarding or rebound.   Skin:     General: Skin is warm and dry.   Neurological:      Mental Status: She is alert and oriented to person, place, and time.   Psychiatric:         Behavior: Behavior normal.       Significant Labs: Blood Culture:   Recent Labs   Lab 02/14/22  1820   LABBLOO No growth after 5 days.  No growth after 5 days.       CBC:   Recent Labs   Lab 02/23/22  0652 02/24/22  0855   WBC 8.67 5.35   HGB 8.0* 7.3*   HCT 25.4* 24.0*   * 101*       CMP:   Recent Labs   Lab 02/22/22  1318 02/23/22  0652 02/24/22  0855   * 136 135*   K  --  3.8 3.8   CL  --  103 103   CO2  --  28 28   GLU  --  111* 115*   BUN  --  13 15   CREATININE  --  0.6 0.6   CALCIUM  --  7.9* 8.2*   PROT  --  6.3 5.8*   ALBUMIN  --  2.1* 2.0*   BILITOT  --  1.5* 1.2*   ALKPHOS  --  83 88   AST  --  49* 44*   ALT  --  20 22   ANIONGAP  --  5* 4*   EGFRNONAA  --  >60.0 >60.0       Wound Culture:   Recent Labs   Lab 02/15/22  1128 02/21/22  1539   LABAERO No growth No growth  No growth       All pertinent labs within the past 24 hours have been reviewed.    Significant Imaging: I have reviewed all pertinent imaging results/findings within the past 24 hours.      X-Ray Chest 1 View S/P PICC Line by Nursing [[id number]] Resulted: 02/22/22 1403   Order Status: Completed Updated: 02/22/22 1405   Narrative:     EXAMINATION:   XR CHEST 1 VIEW S/P PICC LINE BY NURSING     CLINICAL HISTORY:   PICC PLACEMENT;     TECHNIQUE:   Frontal chest radiograph     COMPARISON:   Chest radiograph 05/02/2021     FINDINGS:   Monitoring leads overlie the chest.  Patient is rotated.     Right-sided PICC line tip appears to extend at least to the mid to distal SVC level, noting the distal aspect is somewhat obscured by overlying monitoring leads.  Cardiomediastinal silhouette is midline and within normal limits.  The lungs are well expanded and grossly clear without pneumothorax.  Otherwise  grossly stable chest.    Impression:       As above.       Electronically signed by: Ammon Diane MD   Date: 02/22/2022   Time: 14:03   X-Ray Lumbar Spine Ap And Lateral [[id number]] Resulted: 02/22/22 1241   Order Status: Completed Updated: 02/22/22 1244   Narrative:     EXAMINATION:   XR LUMBAR SPINE AP AND LATERAL     CLINICAL HISTORY:   To be done standing while TLSO is on.;     FINDINGS:   There is a dextroconvex curvature of the lumbar spine.  There are disc implants at L3-L4 and L4-L5.  The pedicle screws and fixation rods a been removed.  There are antibiotic beads packed along the posterior elements.  There is severe flattening and destruction of L4.  There is DJD.    Impression:       Postsurgical changes as above.       Electronically signed by: Toi Beyer MD   Date: 02/22/2022   Time: 12:41     SURG FL Surgery Fluoro Usage [[id number]] Resulted: 02/21/22 1642   Order Status: Completed Updated: 02/21/22 1642   Narrative:     See OP Notes for results.     IMPRESSION: See OP Notes for results.             This procedure was auto-finalized by: [facility]   X-Ray Scoliosis Complete [[id number]] Resulted: 02/20/22 1036   Order Status: Completed Updated: 02/20/22 1039   Narrative:     EXAMINATION:   XR SCOLIOSIS COMPLETE     CLINICAL HISTORY:   pre-op planning;     TECHNIQUE:   AP and lateral views of the entire spine     COMPARISON:   02/15/2022     FINDINGS:   there is 12 degrees of convex right scoliosis as measured from the superior endplate of T2 to the inferior endplate of T6. There is 25 degrees of convex right scoliosis as measured from the superior endplate of L1 to the inferior endplate of L5.  Postsurgical changes are again seen of a posterior L3 through L5 fusion with interbody spacer placements.  The loosening described on prior imaging is not as well seen on this exam.  There appears to be further destruction of the L4 vertebral body when compared to 02/15/2022 on the lateral view that  is not as well seen on the frontal view.  The frontal view however shows destructive endplate changes at L3-L4 and L4-L5 appear unchanged from 02/15/2022.     The remainder of the vertebral body heights are maintained.  Degenerative changes, moderate, are seen at C3-C4, C4-C5 and C5-C6.  Soft tissues are unremarkable.    Impression:       Destructive endplate changes at L3-L4 and L4-L5 that are concerning for discitis osteomyelitis. On the lateral view, there appears to be further destruction of the L4 vertebral body when compared to 02/15/2022 that is not borne out on the AP view.  Cross-sectional imaging could be considered for further evaluation.       Electronically signed by: Hoa Barclay   Date: 02/20/2022   Time: 10:36   X-Ray Lumbar Complete Including Flex And Ext [[id number]] Resulted: 02/16/22 0752   Order Status: Completed Updated: 02/16/22 0754   Narrative:     EXAMINATION:   XR LUMBAR SPINE 5 VIEW WITH FLEX AND EXT     CLINICAL HISTORY:   evaluate spinal instability; L3-4, L4-5 TLIF with hardware failure in setting of spondylodiscitis;     TECHNIQUE:   Five views of the lumbar spine plus flexion extension views were performed.     COMPARISON:   Lumbar spine exam December 31, 2021, CT of lumbar spine February 14, 2022     FINDINGS:   Stable 35 degree or so dextro rotary scoliosis of lower thoracic and lumbar spine.  No acute fractures with preserved vertebral body heights.  In reconfirmed findings of bilateral posterior hardware instrumented fusion in of L3, L4, and L5 vertebral segments with inter body spacers.  As was better seen on earlier CT, there is paralleling lucency  at the bone metal interfaces of all pedicle screws of concern for hardware loosening.  No fragmented hardware.  The CT demonstrated increased destructive endplate changes at L3-L4 and new destructive endplate changes at L4-L5 concerning for discitis-osteomyelitis reconfirmed on the standard images but better defined on CT.  Grade  1 anterolisthesis of L4 with respect to L3 and L5.  No definite instability based on flexion and extension views.  Flexion and extension views demonstrate no definite instability.    Impression:       As above.       Electronically signed by: Brandan Aguilar   Date: 02/16/2022   Time: 07:52   US Abdomen Limited with Doppler (xpd) [[id number]] Resulted: 02/15/22 1859   Order Status: Completed Updated: 02/15/22 1902   Narrative:     EXAMINATION:   US ABDOMEN LIMITED WITH DOPPLER (XPD)     CLINICAL HISTORY:   cirrhosis;     TECHNIQUE:   Complete abdominal ultrasound with doppler.  Color and spectral Doppler were performed.     COMPARISON:   CT abdomen pelvis 07/27/2021.     FINDINGS:   The visualized portion of the pancreas demonstrates 1.6 x 0.7 x 1.1 cm peripancreatic lymph node.     The liver is normal in size measuring 13 cm.  The liver demonstrates heterogeneous echotexture no focal hepatic lesions are seen.     The gallbladder is surgically absent.  The common duct is not dilated, measuring 3 mm.  No dilated intrahepatic radicles are seen.     The spleen is enlarged in size measuring 20.6 x 10.7 cm with a homogeneous echotexture.     The aorta tapers normally.     The kidneys are normal in size without focal abnormality or evidence of hydronephrosis.     There is no evidence of ascites.     The main portal vein, right portal vein, left portal vein, middle hepatic vein, right hepatic vein, left hepatic vein, SMV, and IVC are patent with proper directional flow.  The main hepatic artery is patent. The umbilical vein is patent.    Impression:       Hepatic cirrhosis and portal hypertension with satisfactory Doppler evaluation of the liver.     Enlarged peripancreatic lymph node.     Electronically signed by resident: Nuha Grubbs   Date: 02/15/2022   Time: 18:27     Electronically signed by: Yovany Beatty MD   Date: 02/15/2022   Time: 18:59   IR Biopsy Bone deep [[id number]] Resulted: 02/15/22 1322   Order Status:  Completed Updated: 02/15/22 1320   Narrative:     EXAMINATION:   IR BIOPSY BONE/DISC DEEP     CLINICAL HISTORY:   L3-4 / L4-5 osteodiscitis;     41 y.o. female with medical history of Hepatitis C, liver cirrhosis, polysubstance abuse, L3 and L4 laminectomy - with admission concerns for back pain. MRI spine revealing discitis/osteomyelitis of the lumbar spine at L3-4 and L4-5. Hence plans for fluroscopy guided L3-4 intervertebral disc biopsy.     TECHNIQUE:   Informed consent was obtained.  Biplane fluoroscopy was used.  Conscious intravenous sedation was provided by an independent radiology nurse who used continuous physiologic monitoring, and the time of the sedation was 20 minutes.     Using usual sterile technique, lidocaine local anesthesia and biplane fluoroscopy guidance, a biopsy procedure was performed via right posterolateral, para pedicular approach.  Student Film Channel Bone Biopsy Device 13Ga was used to obtain samples from the L3-L4 intervertebral disc and L3 inferior endplate.     Samples were sent to the lab.  There were no complications.  The patient tolerated the procedure well and was transferred to the recovery room after the biopsy procedure.  Images and report were permanently recorded.  Fluoroscopy dose was 2634.2 uGycm2.  Reference air kerma was 228.3mGy.  Fluoroscopy time was 2.7 minutes     COMPARISON:   None    Impression:       Successful fluoroscopic guided biopsy of the L3-L4 intervertebral disc and L3 inferior endplate.     Electronically signed by resident: Tomy Frey   Date: 02/15/2022   Time: 12:48     Electronically signed by: Guille Gimenez MD   Date: 02/15/2022   Time: 13:22       Imaging History    2022  Date Procedure Name Status Accession Number Location   02/21/22 04:41 PM SURG FL Surgery Fluoro Usage Final [id number] [facility]   02/20/22 09:49 AM X-Ray Scoliosis Complete Final [id number] [facility]   02/15/22 05:58 PM US Abdomen Limited with Doppler (xpd) Final [id number] [facility]   02/15/22  05:21 PM X-Ray Lumbar Complete Including Flex And Ext Final [id number] [facility]   02/15/22 11:35 AM IR Biopsy Bone deep Final [id number] [facility]   02/14/22 10:43 PM MRI Spine Cervical-Thoracic-Lumbar W W/O Contrast (XPD) Final [id number] [facility]   02/14/22 10:42 PM MRI Brain W WO Contrast Final [id number] [facility]   02/14/22 01:11 PM CT Lumbar Spine Without Contrast Final [id number] [facility]   02/15/22 10:00 AM Echo Final [id number] [facility]

## 2022-02-24 NOTE — PLAN OF CARE
Problem: Occupational Therapy Goal  Goal: Occupational Therapy Goal  Description: Goals to be met by: 3/8/2022     Patient will increase functional independence with ADLs by performing:    Feeding with [place].  UE Dressing with Stand-by Assistance.  LE Dressing with Minimal Assistance.  Grooming while standing at sink with Set-up Assistance.  Toileting from toilet with Stand-by Assistance for hygiene and clothing management.   Toilet transfer to toilet with Contact Guard Assistance.  Pt able to verbalize and adhere to all spinal precautions 100% of the time.  Pt can don/doff TLSO brace stand-by assist    Pt continues to show progress toward goals and recommended to continue skilled OT services to attain optimal occupational performance.  2/24/2022  Alex Gonzales OT  Outcome: Ongoing, Progressing

## 2022-02-24 NOTE — PT/OT/SLP PROGRESS
Physical Therapy      Patient Name:  Rachel Zazueta   MRN:  [id number]    Patient not seen today secondary to Pain. Pt attempted to sit up in chair between OT session and PT attempt for session. Pt back in bed d/t pain on PT attempt. Writing therapist unable to make additional attempts this day with pts pain appropriately managed. Will follow-up per POC as appropriate.     Per OT, pt safe to mobilize with NSG staff assist of 1 at this time. Recommend continued mobility per progressive mobility protocol and PT plan to follow up on 2/25.

## 2022-02-25 LAB
ALBUMIN SERPL BCP-MCNC: 1.9 G/DL (ref 3.5–5.2)
ALP SERPL-CCNC: 84 U/L (ref 55–135)
ALT SERPL W/O P-5'-P-CCNC: 19 U/L (ref 10–44)
ANION GAP SERPL CALC-SCNC: 5 MMOL/L (ref 8–16)
AST SERPL-CCNC: 40 U/L (ref 10–40)
BACTERIA SPEC AEROBE CULT: NO GROWTH
BACTERIA SPEC ANAEROBE CULT: NORMAL
BACTERIA SPEC ANAEROBE CULT: NORMAL
BASOPHILS # BLD AUTO: 0.04 K/UL (ref 0–0.2)
BASOPHILS NFR BLD: 1.3 % (ref 0–1.9)
BILIRUB SERPL-MCNC: 1.4 MG/DL (ref 0.1–1)
BUN SERPL-MCNC: 15 MG/DL (ref 6–20)
CALCIUM SERPL-MCNC: 7.8 MG/DL (ref 8.7–10.5)
CHLORIDE SERPL-SCNC: 103 MMOL/L (ref 95–110)
CO2 SERPL-SCNC: 28 MMOL/L (ref 23–29)
CREAT SERPL-MCNC: 0.5 MG/DL (ref 0.5–1.4)
DIFFERENTIAL METHOD: ABNORMAL
EOSINOPHIL # BLD AUTO: 0.2 K/UL (ref 0–0.5)
EOSINOPHIL NFR BLD: 5.2 % (ref 0–8)
ERYTHROCYTE [DISTWIDTH] IN BLOOD BY AUTOMATED COUNT: 17.6 % (ref 11.5–14.5)
EST. GFR  (AFRICAN AMERICAN): >60 ML/MIN/1.73 M^2
EST. GFR  (NON AFRICAN AMERICAN): >60 ML/MIN/1.73 M^2
GLUCOSE SERPL-MCNC: 81 MG/DL (ref 70–110)
HCT VFR BLD AUTO: 24.7 % (ref 37–48.5)
HGB BLD-MCNC: 7.7 G/DL (ref 12–16)
IMM GRANULOCYTES # BLD AUTO: 0.02 K/UL (ref 0–0.04)
IMM GRANULOCYTES NFR BLD AUTO: 0.7 % (ref 0–0.5)
INR PPP: 1.2 (ref 0.8–1.2)
LYMPHOCYTES # BLD AUTO: 0.7 K/UL (ref 1–4.8)
LYMPHOCYTES NFR BLD: 21.2 % (ref 18–48)
MCH RBC QN AUTO: 28.6 PG (ref 27–31)
MCHC RBC AUTO-ENTMCNC: 31.2 G/DL (ref 32–36)
MCV RBC AUTO: 92 FL (ref 82–98)
MONOCYTES # BLD AUTO: 0.2 K/UL (ref 0.3–1)
MONOCYTES NFR BLD: 7.8 % (ref 4–15)
NEUTROPHILS # BLD AUTO: 2 K/UL (ref 1.8–7.7)
NEUTROPHILS NFR BLD: 63.8 % (ref 38–73)
NRBC BLD-RTO: 0 /100 WBC
PLATELET # BLD AUTO: 68 K/UL (ref 150–450)
PMV BLD AUTO: 9.4 FL (ref 9.2–12.9)
POTASSIUM SERPL-SCNC: 3.6 MMOL/L (ref 3.5–5.1)
PROT SERPL-MCNC: 5.6 G/DL (ref 6–8.4)
PROTHROMBIN TIME: 12.9 SEC (ref 9–12.5)
RBC # BLD AUTO: 2.69 M/UL (ref 4–5.4)
SODIUM SERPL-SCNC: 136 MMOL/L (ref 136–145)
WBC # BLD AUTO: 3.07 K/UL (ref 3.9–12.7)

## 2022-02-25 PROCEDURE — 25000003 PHARM REV CODE 250: Performed by: STUDENT IN AN ORGANIZED HEALTH CARE EDUCATION/TRAINING PROGRAM

## 2022-02-25 PROCEDURE — 99024 PR POST-OP FOLLOW-UP VISIT: ICD-10-PCS | Mod: ,,,

## 2022-02-25 PROCEDURE — 99024 POSTOP FOLLOW-UP VISIT: CPT | Mod: ,,,

## 2022-02-25 PROCEDURE — 85610 PROTHROMBIN TIME: CPT

## 2022-02-25 PROCEDURE — 11000001 HC ACUTE MED/SURG PRIVATE ROOM

## 2022-02-25 PROCEDURE — 63600175 PHARM REV CODE 636 W HCPCS: Performed by: STUDENT IN AN ORGANIZED HEALTH CARE EDUCATION/TRAINING PROGRAM

## 2022-02-25 PROCEDURE — 63600175 PHARM REV CODE 636 W HCPCS: Performed by: PHYSICIAN ASSISTANT

## 2022-02-25 PROCEDURE — 25000003 PHARM REV CODE 250: Performed by: NEUROLOGICAL SURGERY

## 2022-02-25 PROCEDURE — 85025 COMPLETE CBC W/AUTO DIFF WBC: CPT | Performed by: STUDENT IN AN ORGANIZED HEALTH CARE EDUCATION/TRAINING PROGRAM

## 2022-02-25 PROCEDURE — 80053 COMPREHEN METABOLIC PANEL: CPT

## 2022-02-25 PROCEDURE — A4216 STERILE WATER/SALINE, 10 ML: HCPCS | Performed by: NEUROLOGICAL SURGERY

## 2022-02-25 PROCEDURE — 25000003 PHARM REV CODE 250: Performed by: PHYSICIAN ASSISTANT

## 2022-02-25 PROCEDURE — 25000003 PHARM REV CODE 250

## 2022-02-25 RX ADMIN — Medication 10 ML: at 06:02

## 2022-02-25 RX ADMIN — HEPARIN SODIUM 5000 UNITS: 5000 INJECTION INTRAVENOUS; SUBCUTANEOUS at 09:02

## 2022-02-25 RX ADMIN — OXYCODONE HYDROCHLORIDE 10 MG: 10 TABLET ORAL at 04:02

## 2022-02-25 RX ADMIN — Medication 10 ML: at 12:02

## 2022-02-25 RX ADMIN — CEFTRIAXONE 2 G: 2 INJECTION, POWDER, FOR SOLUTION INTRAMUSCULAR; INTRAVENOUS at 02:02

## 2022-02-25 RX ADMIN — ACETAMINOPHEN 1000 MG: 500 TABLET ORAL at 09:02

## 2022-02-25 RX ADMIN — SENNOSIDES AND DOCUSATE SODIUM 1 TABLET: 50; 8.6 TABLET ORAL at 08:02

## 2022-02-25 RX ADMIN — HYDROMORPHONE HYDROCHLORIDE 1 MG: 1 INJECTION, SOLUTION INTRAMUSCULAR; INTRAVENOUS; SUBCUTANEOUS at 09:02

## 2022-02-25 RX ADMIN — PREGABALIN 50 MG: 50 CAPSULE ORAL at 09:02

## 2022-02-25 RX ADMIN — ACETAMINOPHEN 1000 MG: 500 TABLET ORAL at 02:02

## 2022-02-25 RX ADMIN — HEPARIN SODIUM 5000 UNITS: 5000 INJECTION INTRAVENOUS; SUBCUTANEOUS at 06:02

## 2022-02-25 RX ADMIN — PANTOPRAZOLE SODIUM 40 MG: 20 TABLET, DELAYED RELEASE ORAL at 08:02

## 2022-02-25 RX ADMIN — PREGABALIN 50 MG: 50 CAPSULE ORAL at 02:02

## 2022-02-25 RX ADMIN — OXYCODONE HYDROCHLORIDE 15 MG: 10 TABLET ORAL at 02:02

## 2022-02-25 RX ADMIN — ACETAMINOPHEN 1000 MG: 500 TABLET ORAL at 06:02

## 2022-02-25 RX ADMIN — ALUMINUM HYDROXIDE, MAGNESIUM HYDROXIDE, AND SIMETHICONE 30 ML: 200; 200; 20 SUSPENSION ORAL at 10:02

## 2022-02-25 RX ADMIN — HEPARIN SODIUM 5000 UNITS: 5000 INJECTION INTRAVENOUS; SUBCUTANEOUS at 02:02

## 2022-02-25 RX ADMIN — OXYCODONE HYDROCHLORIDE 15 MG: 10 TABLET ORAL at 10:02

## 2022-02-25 RX ADMIN — PREGABALIN 50 MG: 50 CAPSULE ORAL at 08:02

## 2022-02-25 RX ADMIN — SERTRALINE HYDROCHLORIDE 50 MG: 50 TABLET ORAL at 08:02

## 2022-02-25 RX ADMIN — OXYCODONE HYDROCHLORIDE 15 MG: 10 TABLET ORAL at 06:02

## 2022-02-25 NOTE — PLAN OF CARE
Problem: Adult Inpatient Plan of Care  Goal: Plan of Care Review  Outcome: Ongoing, Progressing  Goal: Optimal Comfort and Wellbeing  Outcome: Ongoing, Progressing     Problem: Fall Injury Risk  Goal: Absence of Fall and Fall-Related Injury  Outcome: Ongoing, Progressing     Problem: Infection  Goal: Absence of Infection Signs and Symptoms  Outcome: Ongoing, Progressing       Patient is AAO x4. POC reviewed with patient. Patient verbalized understanding. Patient's breathing is unlabored with equal chest expansion. Patient has an incision on spine with wound vac and two hemovacs in place. Incision is covered with dressing that is intact and clean. Patient remained free from falls. Patient rested well through shift. Bed in lowest position,bed alarm on, side rails up x3, no complaints or signs of distress. WCTM.  Pain managed with PRN pain medications. See flowsheets for full assessment and VS info.

## 2022-02-25 NOTE — PROGRESS NOTES
"[facility] - Neurosurgery ([facility])  Adult Nutrition  Progress Note    SUMMARY       Recommendations    1. Suggest low sodium diet as tolerated.     2. If po intake <50%, recommend adding Optisource ONS BID.     3. RD following.     Goals: continue to consume % of meals by RD follow-up  Nutrition Goal Status: new  Communication of RD Recs:  (POC)    Assessment and Plan    Nutrition Problem  Increased Nutrient Needs: Protein     Related to (etiology):   Physiological demands      Signs and Symptoms (as evidenced by):   Spondylodiscitis    Interventions (treatment strategy):  Collaboration of nutrition care with other providers     Nutrition Diagnosis Status:   New     Reason for Assessment    Reason For Assessment: length of stay  Diagnosis:  (Spondylodiscitis)  Relevant Medical History: cirrhosis, Hep C, pancytopenia, polysubstance abuse, epidural abscess s/p L3-L4 laminectomy and L3-L5 TLIF 4/2021  Interdisciplinary Rounds: did not attend  General Information Comments: Pt presents with persistent and progressive lumbar spondylodiscitis with resultant hardware failure and new scoliosis. S/p hardware removal 2/21. Pt resting in bed with no family at bedside. Pt reports good po intake at this time with some nausea. Pt reports normal appetite with no wt changes PTA.  lbs. Pt appears nourished.   Nutrition Discharge Planning: Low Sodium Diet    Nutrition Risk Screen    Nutrition Risk Screen: no indicators present    Nutrition/Diet History    Spiritual, Cultural Beliefs, Evangelical Practices, Values that Affect Care: no  Factors Affecting Nutritional Intake: None identified at this time    Anthropometrics    Temp: (!) 16 °F (-8.9 °C)  Height Method: Stated  Height: 5' 7" (170.2 cm)  Height (inches): 67 in  Weight Method: Bed Scale  Weight: 133.8 kg (294 lb 15.6 oz)  Weight (lb): 294.98 lb  Ideal Body Weight (IBW), Female: 135 lb  % Ideal Body Weight, Female (lb): 218.5 %  BMI (Calculated): 46.2  BMI Grade: " greater than 40 - morbid obesity     Lab/Procedures/Meds    Pertinent Labs Reviewed: reviewed  Pertinent Labs Comments: KAYLIN maddox 1.4  Pertinent Medications Reviewed: reviewed  Pertinent Medications Comments: acetaminophen, pantoprazole, senna-docusate     Estimated/Assessed Needs    Weight Used For Calorie Calculations: 133.8 kg (294 lb 15.6 oz)  Energy Calorie Requirements (kcal): 2035 kcal/day  Energy Need Method: [place]-St Jeor (no AF 2/2 obesity)  Protein Requirements: 107-134 gm/day (0.8-1.0 gm/kg)  Weight Used For Protein Calculations: 133.8 kg (294 lb 15.6 oz)  Fluid Requirements (mL): 1 mL/kcal or per MD     RDA Method (mL): 2035     Nutrition Prescription Ordered    Current Diet Order: Regular    Evaluation of Received Nutrient/Fluid Intake    I/O: -9.326L since admit   Comments: LB 2/23  Tolerance: tolerating  % Intake of Estimated Energy Needs: 75 - 100 %  % Meal Intake: 75 - 100 %    Nutrition Risk    Level of Risk/Frequency of Follow-up:  (f/u 1 x wk)     Monitor and Evaluation    Food and Nutrient Intake: energy intake, food and beverage intake  Food and Nutrient Adminstration: diet order  Physical Activity and Function: nutrition-related ADLs and IADLs  Anthropometric Measurements: weight, weight change, body mass index  Biochemical Data, Medical Tests and Procedures: electrolyte and renal panel, gastrointestinal profile, glucose/endocrine profile, inflammatory profile, lipid profile  Nutrition-Focused Physical Findings: overall appearance     Nutrition Follow-Up    RD Follow-up?: Yes

## 2022-02-25 NOTE — PLAN OF CARE
"42 y/o F with PMH of cirrhosis, Hep C, pancytopenia, polysubstance abuse, and epidural abscess s/p L3-L4 laminectomy and L3-L5 TLIF (4/2021; blood and surgical cultures positive for group B strep) [place] [facility] with progressive lumbar spondylodiscitis and hardware failure. IM6 consulted for preop risk stratification and medical optimization.     Chart reviewed. S/p washout and hardware removal 2/21. Plan for reinstrumentation on 3/2.     - RCRI 0   - blood and biopsy cultures negative so far  - surgical cultures negative so far   - continue rocephin per ID recs, PICC placed   - daily CBC, CMP, INR   - will need outpatient hepatology referral    - recommend placement on discharge for continued IV abx given hx of drug use and noncompliance with previous IV abx     Please secure chat [facility] Medicine M ("Medicine Consult Only") or contact me directly for any additional questions or concerns.    Cecille Rhodes MD  PGY1  Hospital Medicine Consult Service    "

## 2022-02-25 NOTE — PLAN OF CARE
Recommendations    1. Suggest low sodium diet as tolerated.     2. If po intake <50%, recommend adding Optisource ONS BID.     3. RD following.     Goals: continue to consume % of meals by RD follow-up  Nutrition Goal Status: new

## 2022-02-25 NOTE — ASSESSMENT & PLAN NOTE
Rachel Zazueta is a 41 F with PMH hepatitis C, cirrhosis, pancytopenia, nicotine abuse, hx IVDU (last use 2 years ago), strep agalactae bacteremia, s/p L3-5 TLIF on 4/16/2021 who presents with persistent and progressive lumbar spondylodiscitis with resultant hardware failure and new scoliosis.     S/p hardware removal on 2/21.    --Patient admitted to NPU on telemetry.      -q4h neurochecks on floor.  --Xray with TLSO brace in place completed and reviewed.  --XR lumbar spine flex/ext completed and reviewed.  --All labs and diagnostics personally reviewed  --Follow-up MRI Brain, C/T/L w/wo contrast:   -No evidence of osteomyelitis, discitis, or epidural abscess within the cervical or thoracic spine   -No diffusion positive or abnormal enhancement within the brain   -MRI brain shows patchy nonspecific increased T2 and FLAIR signal in the periventricular and subcortical white matter bilaterally. Could be due to chronic microvascular ischemic changes vs demyelinating disease given patient's age. No evidence of active demyelination.   -DDD C4-7 with mild to moderate canal stenosis  --Full infectious w/u.   -ESR 57, CRP 29, Procal 0.05, Lactate 1.1, BCx 2/14 NGTD, UA/Ucx pending. Wound Cx pending.   -F/u ID recs - now on Rocephin. PICC placed.   --S/p L3-4 disc biopsy and L3 bone biopsy with IR 2/15 - Gram stain rare WBC. Cx NGTD.  -- consult for risk stratification and medical optimization. Appreciate assistance. RCRI 0, 3.9% risk of perioperative cardiac complication.   --Utox + for amphetamines, addiction psych consulted per .   --Hold anti-plt/coag medications.  --Monitor for urinary retention - voiding spontaneously. Bladder scan prn.  --Pain control: Oxy 10 q4h PRN for moderate pain, Oxy 15 q4h PRN for severe pain, Dilaudid 1 mg q4h PRN when pain not relieved by PO meds. Continue lyrica 50 mg TID.  --Continue to monitor clinically, notify NSGY immediately with any changes in neuro status  --Drains: Keep,  continue IV abx while in place. High output, will switch to gravity.  --H&H 7.7 and 24.7 this am s/p 1u prbc transfusion. Continue to monitor.  --DVT ppx: TEDs/SCDs/SQH  --Bowel regimen: Miralax, senna, lactulose 2/23. + BM.  --Activity: PT/OT OOB, TLSO brace when OOB    Discussed with Dr. Templeton.    Dispo: Likely OR on 3/2/22 for reinstrumentation.

## 2022-02-25 NOTE — SUBJECTIVE & OBJECTIVE
Interval History: Afebrile, vitals stable. Wound vac seal leak overnight, tegaderm applied and proper seal obtained. S/p 1u prbc transfusion yesterday. H7H 7.7 & 24.7 this am. HV drains to gravity with 235 and 20 output. Neuro exam is stable. UA ordered for hazy, dark fawn urine. Denies dysuria.     Medications:  Continuous Infusions:  Scheduled Meds:   acetaminophen  1,000 mg Oral Q8H    cefTRIAXone (ROCEPHIN) IVPB  2 g Intravenous Q12H    heparin (porcine)  5,000 Units Subcutaneous Q8H    pantoprazole  40 mg Oral Daily    polyethylene glycol  17 g Oral Daily    pregabalin  50 mg Oral TID    senna-docusate 8.6-50 mg  1 tablet Oral Daily    sertraline  50 mg Oral Daily    sodium chloride 0.9%  10 mL Intravenous Q6H     PRN Meds:sodium chloride, aluminum-magnesium hydroxide-simethicone, dextrose 10%, dextrose 10%, glucagon (human recombinant), glucose, glucose, HYDROmorphone, melatonin, oxyCODONE, oxyCODONE, Flushing PICC Protocol **AND** sodium chloride 0.9% **AND** sodium chloride 0.9%       Objective:     Weight: 133.8 kg (294 lb 15.6 oz)  Body mass index is 46.2 kg/m².  Vital Signs (Most Recent):  Temp: 98.5 °F (36.9 °C) (02/25/22 0841)  Pulse: 97 (02/25/22 0841)  Resp: 16 (02/25/22 1026)  BP: (!) 120/58 (02/25/22 0841)  SpO2: 100 % (02/25/22 0841)   Vital Signs (24h Range):  Temp:  [97.7 °F (36.5 °C)-98.5 °F (36.9 °C)] 98.5 °F (36.9 °C)  Pulse:  [] 97  Resp:  [13-18] 16  SpO2:  [94 %-100 %] 100 %  BP: ()/(46-58) 120/58                          Closed/Suction Drain 02/21/22 1644 Left;Medial Back Accordion 10 Fr. (Active)   Site Description Unable to view 02/25/22 0800   Dressing Type Transparent (Tegaderm) 02/25/22 0800   Dressing Status Clean;Dry;Intact 02/25/22 0800   Dressing Intervention Integrity maintained 02/25/22 0400   Drainage Dark red 02/25/22 0800   Status Open to gravity drainage 02/25/22 0400   Output (mL) 120 mL 02/24/22 2000            Closed/Suction Drain 02/21/22 1645 Right;Medial  Back Accordion 10 Fr. (Active)   Site Description Unable to view 02/25/22 0800   Dressing Type Transparent (Tegaderm) 02/25/22 0800   Dressing Status Clean;Intact;Dry 02/25/22 0800   Dressing Intervention Integrity maintained 02/25/22 0800   Drainage Dark red 02/25/22 0800   Status Open to gravity drainage 02/25/22 0400   Output (mL) 20 mL 02/24/22 2000       Female External Urinary Catheter 02/22/22 2300 (Active)   Skin no redness;no breakdown 02/25/22 0800   Tolerance no signs/symptoms of discomfort 02/25/22 0800   Suction Continuous suction at 70 mmHg 02/24/22 2000   Date of last wick change 02/24/22 02/24/22 2000   Time of last wick change 2000 02/24/22 2000   Output (mL) 800 mL 02/23/22 0800     Neurosurgery Physical Exam  General: Well developed, well nourished, not in acute distress.   Head: Normocephalic, atraumatic.  Neck: Full ROM.   Neurologic: Alert and oriented x 4. Thought content appropriate.  GCS: Motor:6  Verbal:5  Eyes:4   GCS Total: 15  Language: No aphasia.  Speech: No dysarthria.  Cranial nerves: Face symmetric, tongue midline, CN II-XII grossly intact.   Eyes: Pupils equal, round, reactive to light with accomodation, EOMI.   Pulmonary: Normal respirations, no signs of respiratory distress.  Abdomen: Soft, non-distended, non-tender to palpation.  Sensory: Intact to light touch throughout.  Motor Strength: Moves all extremities spontaneously with good tone. No abnormal movements seen.      Strength   Deltoids Triceps Biceps Wrist Extension Wrist Flexion Hand    Upper: R 5/5 5/5 5/5 5/5 5/5 5/5     L 5/5 5/5 5/5 5/5 5/5 5/5       Hip Flexion Knee Extension Knee  Flexion Dorsiflexion Plantar flexion EHL   Lower: R 5/5 5/5 5/5 5/5 5/5 5/5     L 4/5 4/5 4/5 5/5 5/5 5/5      Parker: Absent.  Clonus: 3 beats bilaterally.  Skin: Skin is warm, dry and intact.     Incision c/d/I with WV in place with good seal. No surrounding erythema or edema. No drainage from incision. No palpable hematoma or  underlying fluid collection.    HV drains in place.     Significant Labs:  Recent Labs   Lab 02/24/22  0855 02/25/22  0446   * 81   * 136   K 3.8 3.6    103   CO2 28 28   BUN 15 15   CREATININE 0.6 0.5   CALCIUM 8.2* 7.8*     Recent Labs   Lab 02/24/22  0855 02/25/22  0446   WBC 5.35 3.07*   HGB 7.3* 7.7*   HCT 24.0* 24.7*   * 68*     Recent Labs   Lab 02/24/22  0855 02/25/22  0446   INR 1.3* 1.2     Microbiology Results (last 7 days)       Procedure Component Value Units Date/Time    Aerobic culture [[id number]] Collected: 02/21/22 1539    Order Status: Completed Specimen: Abscess from Back Updated: 02/25/22 0957     Aerobic Bacterial Culture No growth    Narrative:      Epidural purulence #1    Culture, Anaerobe [[id number]] Collected: 02/21/22 1539    Order Status: Completed Specimen: Abscess from Back Updated: 02/25/22 0728     Anaerobic Culture No anaerobes isolated    Narrative:      Epidural purulence #2    Culture, Anaerobe [[id number]] Collected: 02/21/22 1539    Order Status: Completed Specimen: Abscess from Back Updated: 02/25/22 0728     Anaerobic Culture No anaerobes isolated    Narrative:      Epidural purulence #1    Aerobic culture [[id number]] Collected: 02/21/22 1539    Order Status: Completed Specimen: Abscess from Back Updated: 02/24/22 1124     Aerobic Bacterial Culture No growth    Narrative:      Epidural purulence #2    AFB Culture & Smear [[id number]] Collected: 02/21/22 1539    Order Status: Completed Specimen: Abscess from Back Updated: 02/22/22 2127     AFB Culture & Smear Culture in progress     AFB CULTURE STAIN No acid fast bacilli seen.    Narrative:      Epidural purulence #1    AFB Culture & Smear [[id number]] Collected: 02/21/22 1539    Order Status: Completed Specimen: Abscess from Back Updated: 02/22/22 2127     AFB Culture & Smear Culture in progress     AFB CULTURE STAIN No acid fast bacilli seen.    Narrative:      Epidural purulence #2    Culture,  Anaerobic [[id number]] Collected: 02/15/22 1128    Order Status: Completed Specimen: Biopsy from Back Updated: 02/22/22 0656     Anaerobic Culture No anaerobes isolated    Narrative:      L3-4 / L4-5 osteodiscitis    Gram stain [[id number]] Collected: 02/21/22 1539    Order Status: Completed Specimen: Abscess from Back Updated: 02/21/22 1753     Gram Stain Result No organisms seen      Few WBC's    Narrative:      Epidural purulence #2    Gram stain [[id number]] Collected: 02/21/22 1539    Order Status: Completed Specimen: Abscess from Back Updated: 02/21/22 1751     Gram Stain Result No organisms seen      Few WBC's    Narrative:      Epidural purulence #1    Fungus culture [[id number]] Collected: 02/21/22 1539    Order Status: Sent Specimen: Abscess from Back Updated: 02/21/22 1657    Fungus culture [[id number]] Collected: 02/21/22 1539    Order Status: Sent Specimen: Abscess from Back Updated: 02/21/22 1656    Fungus culture [[id number]] Collected: 02/15/22 1128    Order Status: Completed Specimen: Biopsy from Back Updated: 02/21/22 1150     Fungus (Mycology) Culture Culture in progress    Narrative:      L3-4 / L4-5 osteodiscitis    Blood Culture #2 **CANNOT BE ORDERED STAT** [[id number]] Collected: 02/14/22 1820    Order Status: Completed Specimen: Blood from Peripheral, Antecubital, Right Updated: 02/19/22 2012     Blood Culture, Routine No growth after 5 days.    Blood Culture #1 **CANNOT BE ORDERED STAT** [[id number]] Collected: 02/14/22 1820    Order Status: Completed Specimen: Blood from Peripheral, Forearm, Right Updated: 02/19/22 2012     Blood Culture, Routine No growth after 5 days.    Aerobic culture [[id number]] Collected: 02/15/22 1128    Order Status: Completed Specimen: Biopsy from Back Updated: 02/19/22 1030     Aerobic Bacterial Culture No growth    Narrative:      L3-4 / L4-5 osteodiscitis          All pertinent labs from the last 24 hours have been reviewed.    Significant Diagnostics:  I have reviewed  and interpreted all pertinent imaging results/findings within the past 24 hours.

## 2022-02-25 NOTE — PROGRESS NOTES
[facility] - Neurosurgery ([facility])  Neurosurgery  Progress Note    Subjective:     History of Present Illness: Rachel Zazueta is a 41 y.o.  female with PMHx of Hepatitis C, liver cirrhosis, pancytopenia, and polysubstance abuse, who presents to clinic for follow up with new imaging s/p L3 and L4 laminectomy for evacuation of epidural abscess and L3-L5 TLIF on 04/16/2021.     The pt c/o worsening back and left leg pain since October. She states that she is no longer using IV drugs. States only using marijuana. Describes the left leg pain as a shock down the leg when standing for 10 minutes. Denies taking any antibiotics. Endorses new weakness in the legs. Denies b/b dysfunction. Denies new neck pain. She ambulates with a walker at home. States that she smoke.    She presents to ED as direct admit from clinic.       Post-Op Info:  Procedure(s) (LRB):  REMOVAL, HARDWARE, SPINE (N/A)   4 Days Post-Op     Interval History: Afebrile, vitals stable. Wound vac seal leak overnight, tegaderm applied and proper seal obtained. S/p 1u prbc transfusion yesterday. H7H 7.7 & 24.7 this am. HV drains to gravity with 235 and 20 output. Neuro exam is stable. UA ordered for hazy, dark fawn urine. Denies dysuria.     Medications:  Continuous Infusions:  Scheduled Meds:   acetaminophen  1,000 mg Oral Q8H    cefTRIAXone (ROCEPHIN) IVPB  2 g Intravenous Q12H    heparin (porcine)  5,000 Units Subcutaneous Q8H    pantoprazole  40 mg Oral Daily    polyethylene glycol  17 g Oral Daily    pregabalin  50 mg Oral TID    senna-docusate 8.6-50 mg  1 tablet Oral Daily    sertraline  50 mg Oral Daily    sodium chloride 0.9%  10 mL Intravenous Q6H     PRN Meds:sodium chloride, aluminum-magnesium hydroxide-simethicone, dextrose 10%, dextrose 10%, glucagon (human recombinant), glucose, glucose, HYDROmorphone, melatonin, oxyCODONE, oxyCODONE, Flushing PICC Protocol **AND** sodium chloride 0.9% **AND** sodium chloride 0.9%       Objective:      Weight: 133.8 kg (294 lb 15.6 oz)  Body mass index is 46.2 kg/m².  Vital Signs (Most Recent):  Temp: 98.5 °F (36.9 °C) (02/25/22 0841)  Pulse: 97 (02/25/22 0841)  Resp: 16 (02/25/22 1026)  BP: (!) 120/58 (02/25/22 0841)  SpO2: 100 % (02/25/22 0841)   Vital Signs (24h Range):  Temp:  [97.7 °F (36.5 °C)-98.5 °F (36.9 °C)] 98.5 °F (36.9 °C)  Pulse:  [] 97  Resp:  [13-18] 16  SpO2:  [94 %-100 %] 100 %  BP: ()/(46-58) 120/58                          Closed/Suction Drain 02/21/22 1644 Left;Medial Back Accordion 10 Fr. (Active)   Site Description Unable to view 02/25/22 0800   Dressing Type Transparent (Tegaderm) 02/25/22 0800   Dressing Status Clean;Dry;Intact 02/25/22 0800   Dressing Intervention Integrity maintained 02/25/22 0400   Drainage Dark red 02/25/22 0800   Status Open to gravity drainage 02/25/22 0400   Output (mL) 120 mL 02/24/22 2000            Closed/Suction Drain 02/21/22 1645 Right;Medial Back Accordion 10 Fr. (Active)   Site Description Unable to view 02/25/22 0800   Dressing Type Transparent (Tegaderm) 02/25/22 0800   Dressing Status Clean;Intact;Dry 02/25/22 0800   Dressing Intervention Integrity maintained 02/25/22 0800   Drainage Dark red 02/25/22 0800   Status Open to gravity drainage 02/25/22 0400   Output (mL) 20 mL 02/24/22 2000       Female External Urinary Catheter 02/22/22 2300 (Active)   Skin no redness;no breakdown 02/25/22 0800   Tolerance no signs/symptoms of discomfort 02/25/22 0800   Suction Continuous suction at 70 mmHg 02/24/22 2000   Date of last wick change 02/24/22 02/24/22 2000   Time of last wick change 2000 02/24/22 2000   Output (mL) 800 mL 02/23/22 0800     Neurosurgery Physical Exam  General: Well developed, well nourished, not in acute distress.   Head: Normocephalic, atraumatic.  Neck: Full ROM.   Neurologic: Alert and oriented x 4. Thought content appropriate.  GCS: Motor:6  Verbal:5  Eyes:4   GCS Total: 15  Language: No aphasia.  Speech: No  dysarthria.  Cranial nerves: Face symmetric, tongue midline, CN II-XII grossly intact.   Eyes: Pupils equal, round, reactive to light with accomodation, EOMI.   Pulmonary: Normal respirations, no signs of respiratory distress.  Abdomen: Soft, non-distended, non-tender to palpation.  Sensory: Intact to light touch throughout.  Motor Strength: Moves all extremities spontaneously with good tone. No abnormal movements seen.      Strength   Deltoids Triceps Biceps Wrist Extension Wrist Flexion Hand    Upper: R 5/5 5/5 5/5 5/5 5/5 5/5     L 5/5 5/5 5/5 5/5 5/5 5/5       Hip Flexion Knee Extension Knee  Flexion Dorsiflexion Plantar flexion EHL   Lower: R 5/5 5/5 5/5 5/5 5/5 5/5     L 4/5 4/5 4/5 5/5 5/5 5/5      Parker: Absent.  Clonus: 3 beats bilaterally.  Skin: Skin is warm, dry and intact.     Incision c/d/I with WV in place with good seal. No surrounding erythema or edema. No drainage from incision. No palpable hematoma or underlying fluid collection.    HV drains in place.     Significant Labs:  Recent Labs   Lab 02/24/22  0855 02/25/22  0446   * 81   * 136   K 3.8 3.6    103   CO2 28 28   BUN 15 15   CREATININE 0.6 0.5   CALCIUM 8.2* 7.8*     Recent Labs   Lab 02/24/22  0855 02/25/22  0446   WBC 5.35 3.07*   HGB 7.3* 7.7*   HCT 24.0* 24.7*   * 68*     Recent Labs   Lab 02/24/22  0855 02/25/22  0446   INR 1.3* 1.2     Microbiology Results (last 7 days)       Procedure Component Value Units Date/Time    Aerobic culture [[id number]] Collected: 02/21/22 1539    Order Status: Completed Specimen: Abscess from Back Updated: 02/25/22 0957     Aerobic Bacterial Culture No growth    Narrative:      Epidural purulence #1    Culture, Anaerobe [[id number]] Collected: 02/21/22 1539    Order Status: Completed Specimen: Abscess from Back Updated: 02/25/22 0728     Anaerobic Culture No anaerobes isolated    Narrative:      Epidural purulence #2    Culture, Anaerobe [[id number]] Collected: 02/21/22 3776     Order Status: Completed Specimen: Abscess from Back Updated: 02/25/22 0728     Anaerobic Culture No anaerobes isolated    Narrative:      Epidural purulence #1    Aerobic culture [[id number]] Collected: 02/21/22 1539    Order Status: Completed Specimen: Abscess from Back Updated: 02/24/22 1124     Aerobic Bacterial Culture No growth    Narrative:      Epidural purulence #2    AFB Culture & Smear [[id number]] Collected: 02/21/22 1539    Order Status: Completed Specimen: Abscess from Back Updated: 02/22/22 2127     AFB Culture & Smear Culture in progress     AFB CULTURE STAIN No acid fast bacilli seen.    Narrative:      Epidural purulence #1    AFB Culture & Smear [[id number]] Collected: 02/21/22 1539    Order Status: Completed Specimen: Abscess from Back Updated: 02/22/22 2127     AFB Culture & Smear Culture in progress     AFB CULTURE STAIN No acid fast bacilli seen.    Narrative:      Epidural purulence #2    Culture, Anaerobic [[id number]] Collected: 02/15/22 1128    Order Status: Completed Specimen: Biopsy from Back Updated: 02/22/22 0656     Anaerobic Culture No anaerobes isolated    Narrative:      L3-4 / L4-5 osteodiscitis    Gram stain [[id number]] Collected: 02/21/22 1539    Order Status: Completed Specimen: Abscess from Back Updated: 02/21/22 1753     Gram Stain Result No organisms seen      Few WBC's    Narrative:      Epidural purulence #2    Gram stain [[id number]] Collected: 02/21/22 1539    Order Status: Completed Specimen: Abscess from Back Updated: 02/21/22 1751     Gram Stain Result No organisms seen      Few WBC's    Narrative:      Epidural purulence #1    Fungus culture [[id number]] Collected: 02/21/22 1539    Order Status: Sent Specimen: Abscess from Back Updated: 02/21/22 1657    Fungus culture [[id number]] Collected: 02/21/22 1539    Order Status: Sent Specimen: Abscess from Back Updated: 02/21/22 1656    Fungus culture [[id number]] Collected: 02/15/22 1128    Order Status: Completed Specimen: Biopsy  from Back Updated: 02/21/22 1150     Fungus (Mycology) Culture Culture in progress    Narrative:      L3-4 / L4-5 osteodiscitis    Blood Culture #2 **CANNOT BE ORDERED STAT** [[id number]] Collected: 02/14/22 1820    Order Status: Completed Specimen: Blood from Peripheral, Antecubital, Right Updated: 02/19/22 2012     Blood Culture, Routine No growth after 5 days.    Blood Culture #1 **CANNOT BE ORDERED STAT** [[id number]] Collected: 02/14/22 1820    Order Status: Completed Specimen: Blood from Peripheral, Forearm, Right Updated: 02/19/22 2012     Blood Culture, Routine No growth after 5 days.    Aerobic culture [[id number]] Collected: 02/15/22 1128    Order Status: Completed Specimen: Biopsy from Back Updated: 02/19/22 1030     Aerobic Bacterial Culture No growth    Narrative:      L3-4 / L4-5 osteodiscitis          All pertinent labs from the last 24 hours have been reviewed.    Significant Diagnostics:  I have reviewed and interpreted all pertinent imaging results/findings within the past 24 hours.    Assessment/Plan:     * Spondylodiscitis  Rachel Zazueta is a 41 F with PMH hepatitis C, cirrhosis, pancytopenia, nicotine abuse, hx IVDU (last use 2 years ago), strep agalactae bacteremia, s/p L3-5 TLIF on 4/16/2021 who presents with persistent and progressive lumbar spondylodiscitis with resultant hardware failure and new scoliosis.     S/p hardware removal on 2/21.    --Patient admitted to NPU on telemetry.      -q4h neurochecks on floor.  --Xray with TLSO brace in place completed and reviewed.  --XR lumbar spine flex/ext completed and reviewed.  --All labs and diagnostics personally reviewed  --Follow-up MRI Brain, C/T/L w/wo contrast:   -No evidence of osteomyelitis, discitis, or epidural abscess within the cervical or thoracic spine   -No diffusion positive or abnormal enhancement within the brain   -MRI brain shows patchy nonspecific increased T2 and FLAIR signal in the periventricular and subcortical white  matter bilaterally. Could be due to chronic microvascular ischemic changes vs demyelinating disease given patient's age. No evidence of active demyelination.   -DDD C4-7 with mild to moderate canal stenosis  --Full infectious w/u.   -ESR 57, CRP 29, Procal 0.05, Lactate 1.1, BCx 2/14 NGTD, UA/Ucx pending. Wound Cx pending.   -F/u ID recs - now on Rocephin. PICC placed.   --S/p L3-4 disc biopsy and L3 bone biopsy with IR 2/15 - Gram stain rare WBC. Cx NGTD.  -- consult for risk stratification and medical optimization. Appreciate assistance. RCRI 0, 3.9% risk of perioperative cardiac complication.   --Utox + for amphetamines, addiction psych consulted per .   --Hold anti-plt/coag medications.  --Monitor for urinary retention - voiding spontaneously. Bladder scan prn.  --Pain control: Oxy 10 q4h PRN for moderate pain, Oxy 15 q4h PRN for severe pain, Dilaudid 1 mg q4h PRN when pain not relieved by PO meds. Continue lyrica 50 mg TID.  --Continue to monitor clinically, notify NSGY immediately with any changes in neuro status  --Drains: Keep, continue IV abx while in place. High output, will switch to gravity.  --H&H 7.7 and 24.7 this am s/p 1u prbc transfusion. Continue to monitor.  --DVT ppx: TEDs/SCDs/SQH  --Bowel regimen: Miralax, senna, lactulose 2/23. + BM.  --Activity: PT/OT OOB, TLSO brace when OOB    Discussed with Dr. Templeton.    Dispo: Likely OR on 3/2/22 for reinstrumentation.        Afua Campbell PA-C  Neurosurgery  [facility] - Neurosurgery ([facility])

## 2022-02-25 NOTE — PLAN OF CARE
Patient is not medically ready.  Patient is pending surgery next week.  Patient has drains in place.

## 2022-02-26 LAB
ALBUMIN SERPL BCP-MCNC: 1.8 G/DL (ref 3.5–5.2)
ALP SERPL-CCNC: 84 U/L (ref 55–135)
ALT SERPL W/O P-5'-P-CCNC: 20 U/L (ref 10–44)
ANION GAP SERPL CALC-SCNC: 6 MMOL/L (ref 8–16)
AST SERPL-CCNC: 39 U/L (ref 10–40)
BASOPHILS # BLD AUTO: 0.02 K/UL (ref 0–0.2)
BASOPHILS NFR BLD: 0.8 % (ref 0–1.9)
BILIRUB SERPL-MCNC: 0.9 MG/DL (ref 0.1–1)
BUN SERPL-MCNC: 17 MG/DL (ref 6–20)
CALCIUM SERPL-MCNC: 7.7 MG/DL (ref 8.7–10.5)
CHLORIDE SERPL-SCNC: 104 MMOL/L (ref 95–110)
CO2 SERPL-SCNC: 28 MMOL/L (ref 23–29)
CREAT SERPL-MCNC: 0.6 MG/DL (ref 0.5–1.4)
DIFFERENTIAL METHOD: ABNORMAL
EOSINOPHIL # BLD AUTO: 0.1 K/UL (ref 0–0.5)
EOSINOPHIL NFR BLD: 5.4 % (ref 0–8)
ERYTHROCYTE [DISTWIDTH] IN BLOOD BY AUTOMATED COUNT: 18.2 % (ref 11.5–14.5)
EST. GFR  (AFRICAN AMERICAN): >60 ML/MIN/1.73 M^2
EST. GFR  (NON AFRICAN AMERICAN): >60 ML/MIN/1.73 M^2
GLUCOSE SERPL-MCNC: 115 MG/DL (ref 70–110)
HCT VFR BLD AUTO: 24.2 % (ref 37–48.5)
HGB BLD-MCNC: 7.7 G/DL (ref 12–16)
IMM GRANULOCYTES # BLD AUTO: 0.02 K/UL (ref 0–0.04)
IMM GRANULOCYTES NFR BLD AUTO: 0.8 % (ref 0–0.5)
INR PPP: 1.2 (ref 0.8–1.2)
LYMPHOCYTES # BLD AUTO: 0.6 K/UL (ref 1–4.8)
LYMPHOCYTES NFR BLD: 24 % (ref 18–48)
MCH RBC QN AUTO: 29.2 PG (ref 27–31)
MCHC RBC AUTO-ENTMCNC: 31.8 G/DL (ref 32–36)
MCV RBC AUTO: 92 FL (ref 82–98)
MONOCYTES # BLD AUTO: 0.2 K/UL (ref 0.3–1)
MONOCYTES NFR BLD: 7.9 % (ref 4–15)
NEUTROPHILS # BLD AUTO: 1.5 K/UL (ref 1.8–7.7)
NEUTROPHILS NFR BLD: 61.1 % (ref 38–73)
NRBC BLD-RTO: 0 /100 WBC
PLATELET # BLD AUTO: 73 K/UL (ref 150–450)
PMV BLD AUTO: 9.7 FL (ref 9.2–12.9)
POTASSIUM SERPL-SCNC: 3.7 MMOL/L (ref 3.5–5.1)
PROT SERPL-MCNC: 5.4 G/DL (ref 6–8.4)
PROTHROMBIN TIME: 12.6 SEC (ref 9–12.5)
RBC # BLD AUTO: 2.64 M/UL (ref 4–5.4)
SODIUM SERPL-SCNC: 138 MMOL/L (ref 136–145)
WBC # BLD AUTO: 2.42 K/UL (ref 3.9–12.7)

## 2022-02-26 PROCEDURE — 63600175 PHARM REV CODE 636 W HCPCS: Performed by: STUDENT IN AN ORGANIZED HEALTH CARE EDUCATION/TRAINING PROGRAM

## 2022-02-26 PROCEDURE — 63600175 PHARM REV CODE 636 W HCPCS: Performed by: PHYSICIAN ASSISTANT

## 2022-02-26 PROCEDURE — 25000003 PHARM REV CODE 250: Performed by: STUDENT IN AN ORGANIZED HEALTH CARE EDUCATION/TRAINING PROGRAM

## 2022-02-26 PROCEDURE — 80053 COMPREHEN METABOLIC PANEL: CPT

## 2022-02-26 PROCEDURE — 25000003 PHARM REV CODE 250: Performed by: NEUROLOGICAL SURGERY

## 2022-02-26 PROCEDURE — 25000003 PHARM REV CODE 250

## 2022-02-26 PROCEDURE — A4216 STERILE WATER/SALINE, 10 ML: HCPCS | Performed by: NEUROLOGICAL SURGERY

## 2022-02-26 PROCEDURE — 85610 PROTHROMBIN TIME: CPT

## 2022-02-26 PROCEDURE — 85025 COMPLETE CBC W/AUTO DIFF WBC: CPT | Performed by: STUDENT IN AN ORGANIZED HEALTH CARE EDUCATION/TRAINING PROGRAM

## 2022-02-26 PROCEDURE — 25000003 PHARM REV CODE 250: Performed by: PHYSICIAN ASSISTANT

## 2022-02-26 PROCEDURE — 11000001 HC ACUTE MED/SURG PRIVATE ROOM

## 2022-02-26 RX ORDER — SODIUM CHLORIDE 9 MG/ML
INJECTION, SOLUTION INTRAVENOUS CONTINUOUS
Status: DISCONTINUED | OUTPATIENT
Start: 2022-02-26 | End: 2022-03-15 | Stop reason: HOSPADM

## 2022-02-26 RX ADMIN — POLYETHYLENE GLYCOL 3350 17 G: 17 POWDER, FOR SOLUTION ORAL at 09:02

## 2022-02-26 RX ADMIN — SODIUM CHLORIDE: 0.9 INJECTION, SOLUTION INTRAVENOUS at 02:02

## 2022-02-26 RX ADMIN — ACETAMINOPHEN 1000 MG: 500 TABLET ORAL at 09:02

## 2022-02-26 RX ADMIN — SENNOSIDES AND DOCUSATE SODIUM 1 TABLET: 50; 8.6 TABLET ORAL at 09:02

## 2022-02-26 RX ADMIN — ACETAMINOPHEN 1000 MG: 500 TABLET ORAL at 12:02

## 2022-02-26 RX ADMIN — OXYCODONE HYDROCHLORIDE 10 MG: 10 TABLET ORAL at 02:02

## 2022-02-26 RX ADMIN — HEPARIN SODIUM 5000 UNITS: 5000 INJECTION INTRAVENOUS; SUBCUTANEOUS at 09:02

## 2022-02-26 RX ADMIN — Medication 10 ML: at 12:02

## 2022-02-26 RX ADMIN — OXYCODONE HYDROCHLORIDE 15 MG: 10 TABLET ORAL at 12:02

## 2022-02-26 RX ADMIN — PANTOPRAZOLE SODIUM 40 MG: 20 TABLET, DELAYED RELEASE ORAL at 09:02

## 2022-02-26 RX ADMIN — PREGABALIN 50 MG: 50 CAPSULE ORAL at 03:02

## 2022-02-26 RX ADMIN — SERTRALINE HYDROCHLORIDE 50 MG: 50 TABLET ORAL at 09:02

## 2022-02-26 RX ADMIN — OXYCODONE HYDROCHLORIDE 15 MG: 10 TABLET ORAL at 06:02

## 2022-02-26 RX ADMIN — ACETAMINOPHEN 1000 MG: 500 TABLET ORAL at 06:02

## 2022-02-26 RX ADMIN — HYDROMORPHONE HYDROCHLORIDE 1 MG: 1 INJECTION, SOLUTION INTRAMUSCULAR; INTRAVENOUS; SUBCUTANEOUS at 09:02

## 2022-02-26 RX ADMIN — HEPARIN SODIUM 5000 UNITS: 5000 INJECTION INTRAVENOUS; SUBCUTANEOUS at 12:02

## 2022-02-26 RX ADMIN — CEFTRIAXONE 2 G: 2 INJECTION, POWDER, FOR SOLUTION INTRAMUSCULAR; INTRAVENOUS at 12:02

## 2022-02-26 RX ADMIN — HEPARIN SODIUM 5000 UNITS: 5000 INJECTION INTRAVENOUS; SUBCUTANEOUS at 06:02

## 2022-02-26 RX ADMIN — PREGABALIN 50 MG: 50 CAPSULE ORAL at 09:02

## 2022-02-26 RX ADMIN — Medication 10 ML: at 06:02

## 2022-02-26 RX ADMIN — CEFTRIAXONE 2 G: 2 INJECTION, POWDER, FOR SOLUTION INTRAMUSCULAR; INTRAVENOUS at 02:02

## 2022-02-26 RX ADMIN — OXYCODONE HYDROCHLORIDE 15 MG: 10 TABLET ORAL at 05:02

## 2022-02-26 NOTE — ASSESSMENT & PLAN NOTE
Rachel Zazueta is a 41 F with PMH hepatitis C, cirrhosis, pancytopenia, nicotine abuse, hx IVDU (last use 2 years ago), strep agalactae bacteremia, s/p L3-5 TLIF on 4/16/2021 who presents with persistent and progressive lumbar spondylodiscitis with resultant hardware failure and new scoliosis.     S/p hardware removal on 2/21.    --Patient admitted to NPU on telemetry.      -q4h neurochecks on floor.  --Xray with TLSO brace in place completed and reviewed.  --XR lumbar spine flex/ext completed and reviewed.  --All labs and diagnostics personally reviewed  --Follow-up MRI Brain, C/T/L w/wo contrast:   -No evidence of osteomyelitis, discitis, or epidural abscess within the cervical or thoracic spine   -No diffusion positive or abnormal enhancement within the brain   -MRI brain shows patchy nonspecific increased T2 and FLAIR signal in the periventricular and subcortical white matter bilaterally. Could be due to chronic microvascular ischemic changes vs demyelinating disease given patient's age. No evidence of active demyelination.   -DDD C4-7 with mild to moderate canal stenosis  --Full infectious w/u.   -ESR 57, CRP 29, Procal 0.05, Lactate 1.1, BCx 2/14 NGTD, UA/Ucx pending. Wound Cx pending.   -F/u ID recs - now on Rocephin. PICC placed.   --S/p L3-4 disc biopsy and L3 bone biopsy with IR 2/15 - Gram stain rare WBC. Cx NGTD.  -- consult for risk stratification and medical optimization. Appreciate assistance. RCRI 0, 3.9% risk of perioperative cardiac complication.   --Utox + for amphetamines, addiction psych consulted per .   --Hold anti-plt/coag medications.  --Monitor for urinary retention - voiding spontaneously. Bladder scan prn.  --Pain control: Oxy 10 q4h PRN for moderate pain, Oxy 15 q4h PRN for severe pain, Dilaudid 1 mg q4h PRN when pain not relieved by PO meds. Continue lyrica 50 mg TID.  --Continue to monitor clinically, notify NSGY immediately with any changes in neuro status  --Drains: Keep,  continue IV abx while in place. High output, will switch to gravity.  --H&H 7.7 and 24.7 this yesterday s/p 1u prbc transfusion. Continue to monitor.  --DVT ppx: TEDs/SCDs/SQH  --Bowel regimen: Miralax, senna, lactulose 2/23. + BM.  --Activity: PT/OT OOB, TLSO brace when OOB    Discussed with Dr. Templeton.    Dispo: Likely OR on 3/2/22 for reinstrumentation.

## 2022-02-26 NOTE — PROGRESS NOTES
[facility] - Neurosurgery ([facility])  Neurosurgery  Progress Note    Subjective:     History of Present Illness: Rachel Zazueta is a 41 y.o.  female with PMHx of Hepatitis C, liver cirrhosis, pancytopenia, and polysubstance abuse, who presents to clinic for follow up with new imaging s/p L3 and L4 laminectomy for evacuation of epidural abscess and L3-L5 TLIF on 04/16/2021.     The pt c/o worsening back and left leg pain since October. She states that she is no longer using IV drugs. States only using marijuana. Describes the left leg pain as a shock down the leg when standing for 10 minutes. Denies taking any antibiotics. Endorses new weakness in the legs. Denies b/b dysfunction. Denies new neck pain. She ambulates with a walker at home. States that she smoke.    She presents to ED as direct admit from clinic.       Post-Op Info:  Procedure(s) (LRB):  REMOVAL, HARDWARE, SPINE (N/A)   5 Days Post-Op     Interval History:     2/26: NAEON, neurostable, drain output 135/110. Pending OR 3/2    Medications:  Continuous Infusions:   sodium chloride 0.9% 10 mL/hr at 02/26/22 0206     Scheduled Meds:   acetaminophen  1,000 mg Oral Q8H    cefTRIAXone (ROCEPHIN) IVPB  2 g Intravenous Q12H    heparin (porcine)  5,000 Units Subcutaneous Q8H    pantoprazole  40 mg Oral Daily    polyethylene glycol  17 g Oral Daily    pregabalin  50 mg Oral TID    senna-docusate 8.6-50 mg  1 tablet Oral Daily    sertraline  50 mg Oral Daily    sodium chloride 0.9%  10 mL Intravenous Q6H     PRN Meds:sodium chloride, aluminum-magnesium hydroxide-simethicone, dextrose 10%, dextrose 10%, glucagon (human recombinant), glucose, glucose, HYDROmorphone, melatonin, oxyCODONE, oxyCODONE, Flushing PICC Protocol **AND** sodium chloride 0.9% **AND** sodium chloride 0.9%       Objective:     Weight: 133.8 kg (294 lb 15.6 oz)  Body mass index is 46.2 kg/m².  Vital Signs (Most Recent):  Temp: 97 °F (36.1 °C) (02/26/22 0921)  Pulse: 86 (02/26/22  0921)  Resp: 16 (02/26/22 0921)  BP: 117/63 (02/26/22 0921)  SpO2: (!) 94 % (02/26/22 0921)   Vital Signs (24h Range):  Temp:  [97 °F (36.1 °C)-98.1 °F (36.7 °C)] 97 °F (36.1 °C)  Pulse:  [86-96] 86  Resp:  [16-18] 16  SpO2:  [94 %-98 %] 94 %  BP: (110-117)/(52-63) 117/63                          Closed/Suction Drain 02/21/22 1644 Left;Medial Back Accordion 10 Fr. (Active)   Site Description Unable to view 02/25/22 0800   Dressing Type Transparent (Tegaderm) 02/25/22 0800   Dressing Status Clean;Dry;Intact 02/25/22 0800   Dressing Intervention Integrity maintained 02/25/22 0400   Drainage Dark red 02/25/22 0800   Status Open to gravity drainage 02/25/22 0400   Output (mL) 120 mL 02/24/22 2000            Closed/Suction Drain 02/21/22 1645 Right;Medial Back Accordion 10 Fr. (Active)   Site Description Unable to view 02/25/22 0800   Dressing Type Transparent (Tegaderm) 02/25/22 0800   Dressing Status Clean;Intact;Dry 02/25/22 0800   Dressing Intervention Integrity maintained 02/25/22 0800   Drainage Dark red 02/25/22 0800   Status Open to gravity drainage 02/25/22 0400   Output (mL) 20 mL 02/24/22 2000       Female External Urinary Catheter 02/22/22 2300 (Active)   Skin no redness;no breakdown 02/25/22 0800   Tolerance no signs/symptoms of discomfort 02/25/22 0800   Suction Continuous suction at 70 mmHg 02/24/22 2000   Date of last wick change 02/24/22 02/24/22 2000   Time of last wick change 2000 02/24/22 2000   Output (mL) 800 mL 02/23/22 0800     Neurosurgery Physical Exam  General: Well developed, well nourished, not in acute distress.   Head: Normocephalic, atraumatic.  Neck: Full ROM.   Neurologic: Alert and oriented x 4. Thought content appropriate.  GCS: Motor:6  Verbal:5  Eyes:4   GCS Total: 15  Language: No aphasia.  Speech: No dysarthria.  Cranial nerves: Face symmetric, tongue midline, CN II-XII grossly intact.   Eyes: Pupils equal, round, reactive to light with accomodation, EOMI.   Pulmonary: Normal  respirations, no signs of respiratory distress.  Abdomen: Soft, non-distended, non-tender to palpation.  Sensory: Intact to light touch throughout.  Motor Strength: Moves all extremities spontaneously with good tone. No abnormal movements seen.      Strength   Deltoids Triceps Biceps Wrist Extension Wrist Flexion Hand    Upper: R 5/5 5/5 5/5 5/5 5/5 5/5     L 5/5 5/5 5/5 5/5 5/5 5/5       Hip Flexion Knee Extension Knee  Flexion Dorsiflexion Plantar flexion EHL   Lower: R 5/5 5/5 5/5 5/5 5/5 5/5     L 4/5 4/5 4/5 5/5 5/5 5/5      Parker: Absent.  Clonus: 3 beats bilaterally.  Skin: Skin is warm, dry and intact.     Incision c/d/I with WV in place with good seal. No surrounding erythema or edema. No drainage from incision. No palpable hematoma or underlying fluid collection.    HV drains in place.     Significant Labs:  Recent Labs   Lab 02/25/22 0446 02/26/22  0338   GLU 81 115*    138   K 3.6 3.7    104   CO2 28 28   BUN 15 17   CREATININE 0.5 0.6   CALCIUM 7.8* 7.7*     Recent Labs   Lab 02/25/22 0446 02/26/22  0338   WBC 3.07* 2.42*   HGB 7.7* 7.7*   HCT 24.7* 24.2*   PLT 68* 73*     Recent Labs   Lab 02/25/22 0446 02/26/22  0338   INR 1.2 1.2     Microbiology Results (last 7 days)       Procedure Component Value Units Date/Time    Aerobic culture [[id number]] Collected: 02/21/22 1539    Order Status: Completed Specimen: Abscess from Back Updated: 02/25/22 0957     Aerobic Bacterial Culture No growth    Narrative:      Epidural purulence #1    Culture, Anaerobe [[id number]] Collected: 02/21/22 1539    Order Status: Completed Specimen: Abscess from Back Updated: 02/25/22 0728     Anaerobic Culture No anaerobes isolated    Narrative:      Epidural purulence #2    Culture, Anaerobe [[id number]] Collected: 02/21/22 1539    Order Status: Completed Specimen: Abscess from Back Updated: 02/25/22 0728     Anaerobic Culture No anaerobes isolated    Narrative:      Epidural purulence #1    Aerobic culture  [[id number]] Collected: 02/21/22 1539    Order Status: Completed Specimen: Abscess from Back Updated: 02/24/22 1124     Aerobic Bacterial Culture No growth    Narrative:      Epidural purulence #2    AFB Culture & Smear [[id number]] Collected: 02/21/22 1539    Order Status: Completed Specimen: Abscess from Back Updated: 02/22/22 2127     AFB Culture & Smear Culture in progress     AFB CULTURE STAIN No acid fast bacilli seen.    Narrative:      Epidural purulence #1    AFB Culture & Smear [[id number]] Collected: 02/21/22 1539    Order Status: Completed Specimen: Abscess from Back Updated: 02/22/22 2127     AFB Culture & Smear Culture in progress     AFB CULTURE STAIN No acid fast bacilli seen.    Narrative:      Epidural purulence #2    Culture, Anaerobic [[id number]] Collected: 02/15/22 1128    Order Status: Completed Specimen: Biopsy from Back Updated: 02/22/22 0656     Anaerobic Culture No anaerobes isolated    Narrative:      L3-4 / L4-5 osteodiscitis    Gram stain [[id number]] Collected: 02/21/22 1539    Order Status: Completed Specimen: Abscess from Back Updated: 02/21/22 1753     Gram Stain Result No organisms seen      Few WBC's    Narrative:      Epidural purulence #2    Gram stain [[id number]] Collected: 02/21/22 1539    Order Status: Completed Specimen: Abscess from Back Updated: 02/21/22 1751     Gram Stain Result No organisms seen      Few WBC's    Narrative:      Epidural purulence #1    Fungus culture [[id number]] Collected: 02/21/22 1539    Order Status: Sent Specimen: Abscess from Back Updated: 02/21/22 1657    Fungus culture [[id number]] Collected: 02/21/22 1539    Order Status: Sent Specimen: Abscess from Back Updated: 02/21/22 1656    Fungus culture [[id number]] Collected: 02/15/22 1128    Order Status: Completed Specimen: Biopsy from Back Updated: 02/21/22 1150     Fungus (Mycology) Culture Culture in progress    Narrative:      L3-4 / L4-5 osteodiscitis    Blood Culture #2 **CANNOT BE ORDERED STAT**  [[id number]] Collected: 02/14/22 1820    Order Status: Completed Specimen: Blood from Peripheral, Antecubital, Right Updated: 02/19/22 2012     Blood Culture, Routine No growth after 5 days.    Blood Culture #1 **CANNOT BE ORDERED STAT** [[id number]] Collected: 02/14/22 1820    Order Status: Completed Specimen: Blood from Peripheral, Forearm, Right Updated: 02/19/22 2012     Blood Culture, Routine No growth after 5 days.          All pertinent labs from the last 24 hours have been reviewed.    Significant Diagnostics:  I have reviewed and interpreted all pertinent imaging results/findings within the past 24 hours.I    Assessment/Plan:     * Spondylodiscitis  Rachel Zazueta is a 41 F with PMH hepatitis C, cirrhosis, pancytopenia, nicotine abuse, hx IVDU (last use 2 years ago), strep agalactae bacteremia, s/p L3-5 TLIF on 4/16/2021 who presents with persistent and progressive lumbar spondylodiscitis with resultant hardware failure and new scoliosis.     S/p hardware removal on 2/21.    --Patient admitted to NPU on telemetry.      -q4h neurochecks on floor.  --Xray with TLSO brace in place completed and reviewed.  --XR lumbar spine flex/ext completed and reviewed.  --All labs and diagnostics personally reviewed  --Follow-up MRI Brain, C/T/L w/wo contrast:   -No evidence of osteomyelitis, discitis, or epidural abscess within the cervical or thoracic spine   -No diffusion positive or abnormal enhancement within the brain   -MRI brain shows patchy nonspecific increased T2 and FLAIR signal in the periventricular and subcortical white matter bilaterally. Could be due to chronic microvascular ischemic changes vs demyelinating disease given patient's age. No evidence of active demyelination.   -DDD C4-7 with mild to moderate canal stenosis  --Full infectious w/u.   -ESR 57, CRP 29, Procal 0.05, Lactate 1.1, BCx 2/14 NGTD, UA/Ucx pending. Wound Cx pending.   -F/u ID recs - now on Rocephin. PICC placed.   --S/p L3-4 disc  biopsy and L3 bone biopsy with IR 2/15 - Gram stain rare WBC. Cx NGTD.  --HM consult for risk stratification and medical optimization. Appreciate assistance. RCRI 0, 3.9% risk of perioperative cardiac complication.   --Utox + for amphetamines, addiction psych consulted per .   --Hold anti-plt/coag medications.  --Monitor for urinary retention - voiding spontaneously. Bladder scan prn.  --Pain control: Oxy 10 q4h PRN for moderate pain, Oxy 15 q4h PRN for severe pain, Dilaudid 1 mg q4h PRN when pain not relieved by PO meds. Continue lyrica 50 mg TID.  --Continue to monitor clinically, notify NSGY immediately with any changes in neuro status  --Drains: Keep, continue IV abx while in place. High output, will switch to gravity.  --H&H 7.7 and 24.7 this yesterday s/p 1u prbc transfusion. Continue to monitor.  --DVT ppx: TEDs/SCDs/SQH  --Bowel regimen: Miralax, senna, lactulose 2/23. + BM.  --Activity: PT/OT OOB, TLSO brace when OOB    Discussed with Dr. Templeton.    Dispo: Likely OR on 3/2/22 for reinstrumentation.        Mabel Chang MD  Neurosurgery  [facility] - Neurosurgery ([facility])

## 2022-02-26 NOTE — PLAN OF CARE
Problem: Adult Inpatient Plan of Care  Goal: Plan of Care Review  Outcome: Ongoing, Progressing     Problem: Fall Injury Risk  Goal: Absence of Fall and Fall-Related Injury  Outcome: Ongoing, Progressing  Intervention: Promote Injury-Free Environment  Flowsheets (Taken 2/26/2022 0235)  Safety Promotion/Fall Prevention:   assistive device/personal item within reach   bed alarm set   Fall Risk reviewed with patient/family   medications reviewed   nonskid shoes/socks when out of bed   side rails raised x 2   instructed to call staff for mobility     POC reviewed with patient this shift.  A/O x4.  Respirations unlabored.  Skin w/d.  Continent of b/b.  Up to bedside commode with x1 assist.  Purewick also in use.  Staff assist with tian-care. ABX Tx in progress with no s/s of adverse effects.  Tolerates meds whole with water without difficulty.  Back incision continues with dressings intact.  Woundvac and x2 hemovacs remain intact.  See flowsheet for full assessment.  Back pain continues but appears to be managed well with PRN meds.  VSS.  Able to verbalize wants/needs.  No c/o pain or discomfort at this time.  Fall/safety precautions maintained.

## 2022-02-26 NOTE — PLAN OF CARE
Problem: Adult Inpatient Plan of Care  Goal: Plan of Care Review  Outcome: Ongoing, Progressing     Problem: Bariatric Environmental Safety  Goal: Safety Maintained with Care  Outcome: Ongoing, Progressing     Problem: Fall Injury Risk  Goal: Absence of Fall and Fall-Related Injury  Outcome: Ongoing, Progressing     POC reviewed with patient. Patient verbalized understanding of POC. All comments and concerns addressed. Pain well controlled with PRN medications. Drains to suction. Bed locked and low. Call light within reach. Safety precautions maintained.

## 2022-02-26 NOTE — SUBJECTIVE & OBJECTIVE
Interval History:     2/26: NAEON, neurostable, drain output 135/110. Pending OR 3/2    Medications:  Continuous Infusions:   sodium chloride 0.9% 10 mL/hr at 02/26/22 0206     Scheduled Meds:   acetaminophen  1,000 mg Oral Q8H    cefTRIAXone (ROCEPHIN) IVPB  2 g Intravenous Q12H    heparin (porcine)  5,000 Units Subcutaneous Q8H    pantoprazole  40 mg Oral Daily    polyethylene glycol  17 g Oral Daily    pregabalin  50 mg Oral TID    senna-docusate 8.6-50 mg  1 tablet Oral Daily    sertraline  50 mg Oral Daily    sodium chloride 0.9%  10 mL Intravenous Q6H     PRN Meds:sodium chloride, aluminum-magnesium hydroxide-simethicone, dextrose 10%, dextrose 10%, glucagon (human recombinant), glucose, glucose, HYDROmorphone, melatonin, oxyCODONE, oxyCODONE, Flushing PICC Protocol **AND** sodium chloride 0.9% **AND** sodium chloride 0.9%       Objective:     Weight: 133.8 kg (294 lb 15.6 oz)  Body mass index is 46.2 kg/m².  Vital Signs (Most Recent):  Temp: 97 °F (36.1 °C) (02/26/22 0921)  Pulse: 86 (02/26/22 0921)  Resp: 16 (02/26/22 0921)  BP: 117/63 (02/26/22 0921)  SpO2: (!) 94 % (02/26/22 0921)   Vital Signs (24h Range):  Temp:  [97 °F (36.1 °C)-98.1 °F (36.7 °C)] 97 °F (36.1 °C)  Pulse:  [86-96] 86  Resp:  [16-18] 16  SpO2:  [94 %-98 %] 94 %  BP: (110-117)/(52-63) 117/63                          Closed/Suction Drain 02/21/22 1644 Left;Medial Back Accordion 10 Fr. (Active)   Site Description Unable to view 02/25/22 0800   Dressing Type Transparent (Tegaderm) 02/25/22 0800   Dressing Status Clean;Dry;Intact 02/25/22 0800   Dressing Intervention Integrity maintained 02/25/22 0400   Drainage Dark red 02/25/22 0800   Status Open to gravity drainage 02/25/22 0400   Output (mL) 120 mL 02/24/22 2000            Closed/Suction Drain 02/21/22 1645 Right;Medial Back Accordion 10 Fr. (Active)   Site Description Unable to view 02/25/22 0800   Dressing Type Transparent (Tegaderm) 02/25/22 0800   Dressing Status Clean;Intact;Dry  02/25/22 0800   Dressing Intervention Integrity maintained 02/25/22 0800   Drainage Dark red 02/25/22 0800   Status Open to gravity drainage 02/25/22 0400   Output (mL) 20 mL 02/24/22 2000       Female External Urinary Catheter 02/22/22 2300 (Active)   Skin no redness;no breakdown 02/25/22 0800   Tolerance no signs/symptoms of discomfort 02/25/22 0800   Suction Continuous suction at 70 mmHg 02/24/22 2000   Date of last wick change 02/24/22 02/24/22 2000   Time of last wick change 2000 02/24/22 2000   Output (mL) 800 mL 02/23/22 0800     Neurosurgery Physical Exam  General: Well developed, well nourished, not in acute distress.   Head: Normocephalic, atraumatic.  Neck: Full ROM.   Neurologic: Alert and oriented x 4. Thought content appropriate.  GCS: Motor:6  Verbal:5  Eyes:4   GCS Total: 15  Language: No aphasia.  Speech: No dysarthria.  Cranial nerves: Face symmetric, tongue midline, CN II-XII grossly intact.   Eyes: Pupils equal, round, reactive to light with accomodation, EOMI.   Pulmonary: Normal respirations, no signs of respiratory distress.  Abdomen: Soft, non-distended, non-tender to palpation.  Sensory: Intact to light touch throughout.  Motor Strength: Moves all extremities spontaneously with good tone. No abnormal movements seen.      Strength   Deltoids Triceps Biceps Wrist Extension Wrist Flexion Hand    Upper: R 5/5 5/5 5/5 5/5 5/5 5/5     L 5/5 5/5 5/5 5/5 5/5 5/5       Hip Flexion Knee Extension Knee  Flexion Dorsiflexion Plantar flexion EHL   Lower: R 5/5 5/5 5/5 5/5 5/5 5/5     L 4/5 4/5 4/5 5/5 5/5 5/5      Parker: Absent.  Clonus: 3 beats bilaterally.  Skin: Skin is warm, dry and intact.     Incision c/d/I with WV in place with good seal. No surrounding erythema or edema. No drainage from incision. No palpable hematoma or underlying fluid collection.    HV drains in place.     Significant Labs:  Recent Labs   Lab 02/25/22  0446 02/26/22  0338   GLU 81 115*    138   K 3.6 3.7     104   CO2 28 28   BUN 15 17   CREATININE 0.5 0.6   CALCIUM 7.8* 7.7*     Recent Labs   Lab 02/25/22  0446 02/26/22  0338   WBC 3.07* 2.42*   HGB 7.7* 7.7*   HCT 24.7* 24.2*   PLT 68* 73*     Recent Labs   Lab 02/25/22  0446 02/26/22  0338   INR 1.2 1.2     Microbiology Results (last 7 days)       Procedure Component Value Units Date/Time    Aerobic culture [[id number]] Collected: 02/21/22 1539    Order Status: Completed Specimen: Abscess from Back Updated: 02/25/22 0957     Aerobic Bacterial Culture No growth    Narrative:      Epidural purulence #1    Culture, Anaerobe [[id number]] Collected: 02/21/22 1539    Order Status: Completed Specimen: Abscess from Back Updated: 02/25/22 0728     Anaerobic Culture No anaerobes isolated    Narrative:      Epidural purulence #2    Culture, Anaerobe [[id number]] Collected: 02/21/22 1539    Order Status: Completed Specimen: Abscess from Back Updated: 02/25/22 0728     Anaerobic Culture No anaerobes isolated    Narrative:      Epidural purulence #1    Aerobic culture [[id number]] Collected: 02/21/22 1539    Order Status: Completed Specimen: Abscess from Back Updated: 02/24/22 1124     Aerobic Bacterial Culture No growth    Narrative:      Epidural purulence #2    AFB Culture & Smear [[id number]] Collected: 02/21/22 1539    Order Status: Completed Specimen: Abscess from Back Updated: 02/22/22 2127     AFB Culture & Smear Culture in progress     AFB CULTURE STAIN No acid fast bacilli seen.    Narrative:      Epidural purulence #1    AFB Culture & Smear [[id number]] Collected: 02/21/22 1539    Order Status: Completed Specimen: Abscess from Back Updated: 02/22/22 2127     AFB Culture & Smear Culture in progress     AFB CULTURE STAIN No acid fast bacilli seen.    Narrative:      Epidural purulence #2    Culture, Anaerobic [[id number]] Collected: 02/15/22 1128    Order Status: Completed Specimen: Biopsy from Back Updated: 02/22/22 0656     Anaerobic Culture No anaerobes isolated    Narrative:       L3-4 / L4-5 osteodiscitis    Gram stain [[id number]] Collected: 02/21/22 1539    Order Status: Completed Specimen: Abscess from Back Updated: 02/21/22 1753     Gram Stain Result No organisms seen      Few WBC's    Narrative:      Epidural purulence #2    Gram stain [[id number]] Collected: 02/21/22 1539    Order Status: Completed Specimen: Abscess from Back Updated: 02/21/22 1751     Gram Stain Result No organisms seen      Few WBC's    Narrative:      Epidural purulence #1    Fungus culture [[id number]] Collected: 02/21/22 1539    Order Status: Sent Specimen: Abscess from Back Updated: 02/21/22 1657    Fungus culture [[id number]] Collected: 02/21/22 1539    Order Status: Sent Specimen: Abscess from Back Updated: 02/21/22 1656    Fungus culture [[id number]] Collected: 02/15/22 1128    Order Status: Completed Specimen: Biopsy from Back Updated: 02/21/22 1150     Fungus (Mycology) Culture Culture in progress    Narrative:      L3-4 / L4-5 osteodiscitis    Blood Culture #2 **CANNOT BE ORDERED STAT** [[id number]] Collected: 02/14/22 1820    Order Status: Completed Specimen: Blood from Peripheral, Antecubital, Right Updated: 02/19/22 2012     Blood Culture, Routine No growth after 5 days.    Blood Culture #1 **CANNOT BE ORDERED STAT** [[id number]] Collected: 02/14/22 1820    Order Status: Completed Specimen: Blood from Peripheral, Forearm, Right Updated: 02/19/22 2012     Blood Culture, Routine No growth after 5 days.          All pertinent labs from the last 24 hours have been reviewed.    Significant Diagnostics:  I have reviewed and interpreted all pertinent imaging results/findings within the past 24 hours.I

## 2022-02-27 LAB
ALBUMIN SERPL BCP-MCNC: 1.8 G/DL (ref 3.5–5.2)
ALP SERPL-CCNC: 91 U/L (ref 55–135)
ALT SERPL W/O P-5'-P-CCNC: 21 U/L (ref 10–44)
ANION GAP SERPL CALC-SCNC: 5 MMOL/L (ref 8–16)
ANISOCYTOSIS BLD QL SMEAR: SLIGHT
AST SERPL-CCNC: 43 U/L (ref 10–40)
BASOPHILS # BLD AUTO: ABNORMAL K/UL (ref 0–0.2)
BASOPHILS NFR BLD: 0 % (ref 0–1.9)
BILIRUB SERPL-MCNC: 0.9 MG/DL (ref 0.1–1)
BUN SERPL-MCNC: 18 MG/DL (ref 6–20)
CALCIUM SERPL-MCNC: 7.6 MG/DL (ref 8.7–10.5)
CHLORIDE SERPL-SCNC: 105 MMOL/L (ref 95–110)
CO2 SERPL-SCNC: 28 MMOL/L (ref 23–29)
CREAT SERPL-MCNC: 0.6 MG/DL (ref 0.5–1.4)
DIFFERENTIAL METHOD: ABNORMAL
EOSINOPHIL # BLD AUTO: ABNORMAL K/UL (ref 0–0.5)
EOSINOPHIL NFR BLD: 6 % (ref 0–8)
ERYTHROCYTE [DISTWIDTH] IN BLOOD BY AUTOMATED COUNT: 18.4 % (ref 11.5–14.5)
EST. GFR  (AFRICAN AMERICAN): >60 ML/MIN/1.73 M^2
EST. GFR  (NON AFRICAN AMERICAN): >60 ML/MIN/1.73 M^2
GLUCOSE SERPL-MCNC: 113 MG/DL (ref 70–110)
HCT VFR BLD AUTO: 25.9 % (ref 37–48.5)
HGB BLD-MCNC: 8.1 G/DL (ref 12–16)
HYPOCHROMIA BLD QL SMEAR: ABNORMAL
IMM GRANULOCYTES # BLD AUTO: ABNORMAL K/UL (ref 0–0.04)
IMM GRANULOCYTES NFR BLD AUTO: ABNORMAL % (ref 0–0.5)
INR PPP: 1.3 (ref 0.8–1.2)
LYMPHOCYTES # BLD AUTO: ABNORMAL K/UL (ref 1–4.8)
LYMPHOCYTES NFR BLD: 18 % (ref 18–48)
MCH RBC QN AUTO: 29.2 PG (ref 27–31)
MCHC RBC AUTO-ENTMCNC: 31.3 G/DL (ref 32–36)
MCV RBC AUTO: 94 FL (ref 82–98)
MONOCYTES # BLD AUTO: ABNORMAL K/UL (ref 0.3–1)
MONOCYTES NFR BLD: 5 % (ref 4–15)
NEUTROPHILS NFR BLD: 71 % (ref 38–73)
NRBC BLD-RTO: 0 /100 WBC
PLATELET # BLD AUTO: 71 K/UL (ref 150–450)
PLATELET BLD QL SMEAR: ABNORMAL
PMV BLD AUTO: 10 FL (ref 9.2–12.9)
POLYCHROMASIA BLD QL SMEAR: ABNORMAL
POTASSIUM SERPL-SCNC: 3.7 MMOL/L (ref 3.5–5.1)
PROT SERPL-MCNC: 5.2 G/DL (ref 6–8.4)
PROTHROMBIN TIME: 13.3 SEC (ref 9–12.5)
RBC # BLD AUTO: 2.77 M/UL (ref 4–5.4)
SODIUM SERPL-SCNC: 138 MMOL/L (ref 136–145)
WBC # BLD AUTO: 1.93 K/UL (ref 3.9–12.7)

## 2022-02-27 PROCEDURE — 11000001 HC ACUTE MED/SURG PRIVATE ROOM

## 2022-02-27 PROCEDURE — A4216 STERILE WATER/SALINE, 10 ML: HCPCS | Performed by: NEUROLOGICAL SURGERY

## 2022-02-27 PROCEDURE — 36415 COLL VENOUS BLD VENIPUNCTURE: CPT | Performed by: STUDENT IN AN ORGANIZED HEALTH CARE EDUCATION/TRAINING PROGRAM

## 2022-02-27 PROCEDURE — 97110 THERAPEUTIC EXERCISES: CPT | Mod: CQ

## 2022-02-27 PROCEDURE — 85007 BL SMEAR W/DIFF WBC COUNT: CPT | Performed by: STUDENT IN AN ORGANIZED HEALTH CARE EDUCATION/TRAINING PROGRAM

## 2022-02-27 PROCEDURE — 63600175 PHARM REV CODE 636 W HCPCS: Performed by: PHYSICIAN ASSISTANT

## 2022-02-27 PROCEDURE — 25000003 PHARM REV CODE 250: Performed by: STUDENT IN AN ORGANIZED HEALTH CARE EDUCATION/TRAINING PROGRAM

## 2022-02-27 PROCEDURE — 25000003 PHARM REV CODE 250

## 2022-02-27 PROCEDURE — 94761 N-INVAS EAR/PLS OXIMETRY MLT: CPT

## 2022-02-27 PROCEDURE — 25000003 PHARM REV CODE 250: Performed by: PHYSICIAN ASSISTANT

## 2022-02-27 PROCEDURE — 85027 COMPLETE CBC AUTOMATED: CPT | Performed by: STUDENT IN AN ORGANIZED HEALTH CARE EDUCATION/TRAINING PROGRAM

## 2022-02-27 PROCEDURE — 80053 COMPREHEN METABOLIC PANEL: CPT

## 2022-02-27 PROCEDURE — 25000003 PHARM REV CODE 250: Performed by: NEUROLOGICAL SURGERY

## 2022-02-27 PROCEDURE — 63600175 PHARM REV CODE 636 W HCPCS: Performed by: STUDENT IN AN ORGANIZED HEALTH CARE EDUCATION/TRAINING PROGRAM

## 2022-02-27 PROCEDURE — 97530 THERAPEUTIC ACTIVITIES: CPT | Mod: CQ

## 2022-02-27 PROCEDURE — 85610 PROTHROMBIN TIME: CPT

## 2022-02-27 RX ORDER — POLYETHYLENE GLYCOL 3350 17 G/17G
17 POWDER, FOR SOLUTION ORAL DAILY PRN
Status: DISCONTINUED | OUTPATIENT
Start: 2022-02-27 | End: 2022-03-15 | Stop reason: HOSPADM

## 2022-02-27 RX ADMIN — Medication 10 ML: at 11:02

## 2022-02-27 RX ADMIN — ACETAMINOPHEN 1000 MG: 500 TABLET ORAL at 08:02

## 2022-02-27 RX ADMIN — SODIUM CHLORIDE: 0.9 INJECTION, SOLUTION INTRAVENOUS at 03:02

## 2022-02-27 RX ADMIN — POLYETHYLENE GLYCOL 3350 17 G: 17 POWDER, FOR SOLUTION ORAL at 09:02

## 2022-02-27 RX ADMIN — ACETAMINOPHEN 1000 MG: 500 TABLET ORAL at 05:02

## 2022-02-27 RX ADMIN — CEFTRIAXONE 2 G: 2 INJECTION, POWDER, FOR SOLUTION INTRAMUSCULAR; INTRAVENOUS at 03:02

## 2022-02-27 RX ADMIN — HEPARIN SODIUM 5000 UNITS: 5000 INJECTION INTRAVENOUS; SUBCUTANEOUS at 05:02

## 2022-02-27 RX ADMIN — SERTRALINE HYDROCHLORIDE 50 MG: 50 TABLET ORAL at 09:02

## 2022-02-27 RX ADMIN — PREGABALIN 50 MG: 50 CAPSULE ORAL at 02:02

## 2022-02-27 RX ADMIN — Medication 10 ML: at 12:02

## 2022-02-27 RX ADMIN — HYDROMORPHONE HYDROCHLORIDE 1 MG: 1 INJECTION, SOLUTION INTRAMUSCULAR; INTRAVENOUS; SUBCUTANEOUS at 11:02

## 2022-02-27 RX ADMIN — Medication 10 ML: at 05:02

## 2022-02-27 RX ADMIN — OXYCODONE HYDROCHLORIDE 15 MG: 10 TABLET ORAL at 09:02

## 2022-02-27 RX ADMIN — ACETAMINOPHEN 1000 MG: 500 TABLET ORAL at 02:02

## 2022-02-27 RX ADMIN — HEPARIN SODIUM 5000 UNITS: 5000 INJECTION INTRAVENOUS; SUBCUTANEOUS at 02:02

## 2022-02-27 RX ADMIN — OXYCODONE HYDROCHLORIDE 10 MG: 10 TABLET ORAL at 08:02

## 2022-02-27 RX ADMIN — Medication 10 ML: at 01:02

## 2022-02-27 RX ADMIN — SENNOSIDES AND DOCUSATE SODIUM 1 TABLET: 50; 8.6 TABLET ORAL at 09:02

## 2022-02-27 RX ADMIN — OXYCODONE HYDROCHLORIDE 15 MG: 10 TABLET ORAL at 02:02

## 2022-02-27 RX ADMIN — OXYCODONE HYDROCHLORIDE 15 MG: 10 TABLET ORAL at 12:02

## 2022-02-27 RX ADMIN — CEFTRIAXONE 2 G: 2 INJECTION, POWDER, FOR SOLUTION INTRAMUSCULAR; INTRAVENOUS at 02:02

## 2022-02-27 RX ADMIN — HEPARIN SODIUM 5000 UNITS: 5000 INJECTION INTRAVENOUS; SUBCUTANEOUS at 08:02

## 2022-02-27 RX ADMIN — PREGABALIN 50 MG: 50 CAPSULE ORAL at 08:02

## 2022-02-27 RX ADMIN — HYDROMORPHONE HYDROCHLORIDE 1 MG: 1 INJECTION, SOLUTION INTRAMUSCULAR; INTRAVENOUS; SUBCUTANEOUS at 03:02

## 2022-02-27 RX ADMIN — OXYCODONE HYDROCHLORIDE 15 MG: 10 TABLET ORAL at 05:02

## 2022-02-27 RX ADMIN — PREGABALIN 50 MG: 50 CAPSULE ORAL at 09:02

## 2022-02-27 RX ADMIN — PANTOPRAZOLE SODIUM 40 MG: 20 TABLET, DELAYED RELEASE ORAL at 09:02

## 2022-02-27 NOTE — NURSING
Pt noted to have bleeding to rt hemovac drain incisional area.  Appears to still have suture in place.  Call placed to team to advise.  MD states to clean, dry, and apply pressure dressing and notify if condition worsens.  Order noted and carried out.

## 2022-02-27 NOTE — PT/OT/SLP PROGRESS
"Physical Therapy Treatment    Patient Name:  Rachel Zazueta   MRN:  [id number]    Recommendations:     Discharge Recommendations:  rehabilitation facility   Discharge Equipment Recommendations:  (TBD by next level of care or pending pt progress)   Barriers to discharge: decrease functional mobility     Assessment:     Rachel Zazueta is a 41 y.o. female admitted with a medical diagnosis of Spondylodiscitis.  She presents with the following impairments/functional limitations:  weakness, impaired endurance, impaired self care skills, abnormal tone, pain, impaired coordination, impaired functional mobilty, impaired balance.  Pt tolerated today's therapy fairly. Pt report increase pain in LLE with movement, but is able to relieve with rest. Pt was able to tolerated sitting EOB for ~10 minutes completing seated therex demonstrating good balance. Pt required multiple rest breaks secondary to pain. Pt continues to benefit from therapy to improve functional endurance, strength, and mobility.     Rehab Prognosis: Good; patient would benefit from acute skilled PT services to address these deficits and reach maximum level of function.    Recent Surgery: Procedure(s) (LRB):  REMOVAL, HARDWARE, SPINE (N/A) 6 Days Post-Op    Plan:     During this hospitalization, patient to be seen 3 x/week to address the identified rehab impairments via gait training, therapeutic activities, therapeutic exercises, neuromuscular re-education and progress toward the following goals:    · Plan of Care Expires:  03/24/22    Subjective     Chief Complaint: increase pain with movement   Patient/Family Comments/goals: "surgery schedule for 3/2"  Pain/Comfort:  · Pain Rating 1: 7/10  · Location - Side 1: Left  · Location - Orientation 1: generalized  · Location 1: leg  · Pain Addressed 1: Reposition, Distraction, Pre-medicate for activity  · Pain Rating Post-Intervention 1: 7/10      Objective:     Communicated with nurse prior to session.  Patient " found HOB elevated with hemovac, wound vac, telemetry, pulse ox (continuous), peripheral IV, TLSO upon PT entry to room.     General Precautions: Standard, fall   Orthopedic Precautions:spinal precautions   Braces: TLSO  Respiratory Status: Room air     Functional Mobility:  · Bed Mobility:     · Rolling Right: contact guard assistance with bed rails   · Scooting to EOB: contact guard assistance  · Supine to Sit: contact guard assistance with HOB elevated   · Sit to Supine: contact guard assistance   · Transfers:     · Sit to Stand:  contact guard assistance with rolling walker  · Gait: pt took 2 lateral step towards HOB with Yefri and RW. Pt report increase L hip pain with weightbearing and required to sit.  · Balance: pt demonstrated good sitting EOB balance requiring CGA-close SBA for safety. Pt would occasionally lean laterally to relieve pain       AM-PAC 6 CLICK MOBILITY  Turning over in bed (including adjusting bedclothes, sheets and blankets)?: 3  Sitting down on and standing up from a chair with arms (e.g., wheelchair, bedside commode, etc.): 2  Moving from lying on back to sitting on the side of the bed?: 2  Moving to and from a bed to a chair (including a wheelchair)?: 2  Need to walk in hospital room?: 2  Climbing 3-5 steps with a railing?: 1  Basic Mobility Total Score: 12       Therapeutic Activities and Exercises:   Pt required assistance to dalton and doff TLSO.   Supine BLE therex x10 reps: glute set and quad set     Seated BLE therex x10 reps: LAQ (minimal ROM on LLE secondary to pain), heel/toe raises, hip abd/add  Patient educated on role of therapy, goals of session, and benefits of out of bed mobility.   Instructed on use of call button and importance of calling nursing staff for assistance with mobility   Questions/concerns addressed within PTA scope of practice  Pt verbalized understanding.  Whiteboard Updated      Patient left HOB elevated with all lines intact and call button in  reach..    GOALS:   Multidisciplinary Problems     Physical Therapy Goals        Problem: Physical Therapy Goal    Goal Priority Disciplines Outcome Goal Variances Interventions   Physical Therapy Goal     PT, PT/OT Ongoing, Progressing     Description: Goals to be met by: 3/8/2022     Patient will increase functional independence with mobility by performin. Supine to sit with MInimal Assistance  2. Sit to supine with MInimal Assistance  3. Rolling to Left and Right with Minimal Assistance.  4. Sit to stand transfer with Stand-by Assistance and RW  5. Bed to chair transfer with Minimal Assistance using Rolling Walker                     Time Tracking:     PT Received On: 22  PT Start Time: 1115     PT Stop Time: 1138  PT Total Time (min): 23 min     Billable Minutes: Therapeutic Activity 10 and Therapeutic Exercise 13    Treatment Type: Treatment  PT/PTA: PTA     PTA Visit Number: 1     2022

## 2022-02-27 NOTE — SUBJECTIVE & OBJECTIVE
Interval History:     2/27: NAEON, neurostable, drain output 110/80. Some minor leakage from drain site. Pending OR 3/2    Medications:  Continuous Infusions:   sodium chloride 0.9% 10 mL/hr at 02/27/22 0305     Scheduled Meds:   acetaminophen  1,000 mg Oral Q8H    cefTRIAXone (ROCEPHIN) IVPB  2 g Intravenous Q12H    heparin (porcine)  5,000 Units Subcutaneous Q8H    pantoprazole  40 mg Oral Daily    pregabalin  50 mg Oral TID    senna-docusate 8.6-50 mg  1 tablet Oral Daily    sertraline  50 mg Oral Daily    sodium chloride 0.9%  10 mL Intravenous Q6H     PRN Meds:sodium chloride, aluminum-magnesium hydroxide-simethicone, dextrose 10%, dextrose 10%, glucagon (human recombinant), glucose, glucose, HYDROmorphone, melatonin, oxyCODONE, oxyCODONE, polyethylene glycol, Flushing PICC Protocol **AND** sodium chloride 0.9% **AND** sodium chloride 0.9%       Objective:     Weight: 133.8 kg (294 lb 15.6 oz)  Body mass index is 46.2 kg/m².  Vital Signs (Most Recent):  Temp: 96.3 °F (35.7 °C) (02/27/22 0926)  Pulse: 97 (02/27/22 0926)  Resp: 16 (02/27/22 0938)  BP: (!) 111/53 (02/27/22 0926)  SpO2: 95 % (02/27/22 0926)   Vital Signs (24h Range):  Temp:  [96.3 °F (35.7 °C)-98.5 °F (36.9 °C)] 96.3 °F (35.7 °C)  Pulse:  [] 97  Resp:  [16-19] 16  SpO2:  [94 %-100 %] 95 %  BP: (108-120)/(53-65) 111/53                          Closed/Suction Drain 02/21/22 1644 Left;Medial Back Accordion 10 Fr. (Active)   Site Description Unable to view 02/25/22 0800   Dressing Type Transparent (Tegaderm) 02/25/22 0800   Dressing Status Clean;Dry;Intact 02/25/22 0800   Dressing Intervention Integrity maintained 02/25/22 0400   Drainage Dark red 02/25/22 0800   Status Open to gravity drainage 02/25/22 0400   Output (mL) 120 mL 02/24/22 2000            Closed/Suction Drain 02/21/22 1645 Right;Medial Back Accordion 10 Fr. (Active)   Site Description Unable to view 02/25/22 0800   Dressing Type Transparent (Tegaderm) 02/25/22 0800   Dressing  Status Clean;Intact;Dry 02/25/22 0800   Dressing Intervention Integrity maintained 02/25/22 0800   Drainage Dark red 02/25/22 0800   Status Open to gravity drainage 02/25/22 0400   Output (mL) 20 mL 02/24/22 2000       Female External Urinary Catheter 02/22/22 2300 (Active)   Skin no redness;no breakdown 02/25/22 0800   Tolerance no signs/symptoms of discomfort 02/25/22 0800   Suction Continuous suction at 70 mmHg 02/24/22 2000   Date of last wick change 02/24/22 02/24/22 2000   Time of last wick change 2000 02/24/22 2000   Output (mL) 800 mL 02/23/22 0800     Neurosurgery Physical Exam  General: Well developed, well nourished, not in acute distress.   Head: Normocephalic, atraumatic.  Neck: Full ROM.   Neurologic: Alert and oriented x 4. Thought content appropriate.  GCS: Motor:6  Verbal:5  Eyes:4   GCS Total: 15  Language: No aphasia.  Speech: No dysarthria.  Cranial nerves: Face symmetric, tongue midline, CN II-XII grossly intact.   Eyes: Pupils equal, round, reactive to light with accomodation, EOMI.   Pulmonary: Normal respirations, no signs of respiratory distress.  Abdomen: Soft, non-distended, non-tender to palpation.  Sensory: Intact to light touch throughout.  Motor Strength: Moves all extremities spontaneously with good tone. No abnormal movements seen.      Strength   Deltoids Triceps Biceps Wrist Extension Wrist Flexion Hand    Upper: R 5/5 5/5 5/5 5/5 5/5 5/5     L 5/5 5/5 5/5 5/5 5/5 5/5       Hip Flexion Knee Extension Knee  Flexion Dorsiflexion Plantar flexion EHL   Lower: R 5/5 5/5 5/5 5/5 5/5 5/5     L 4/5 4/5 4/5 5/5 5/5 5/5      Parker: Absent.  Clonus: 3 beats bilaterally.  Skin: Skin is warm, dry and intact.     Incision c/d/I with WV in place with good seal. No surrounding erythema or edema. No drainage from incision. No palpable hematoma or underlying fluid collection.    HV drains in place.     Significant Labs:  Recent Labs   Lab 02/26/22 0338 02/27/22  0259   * 113*   NA  138 138   K 3.7 3.7    105   CO2 28 28   BUN 17 18   CREATININE 0.6 0.6   CALCIUM 7.7* 7.6*     Recent Labs   Lab 02/26/22  0338 02/27/22  0259   WBC 2.42* 1.93*   HGB 7.7* 8.1*   HCT 24.2* 25.9*   PLT 73* 71*     Recent Labs   Lab 02/26/22  0338 02/27/22  0259   INR 1.2 1.3*     Microbiology Results (last 7 days)       Procedure Component Value Units Date/Time    Aerobic culture [[id number]] Collected: 02/21/22 1539    Order Status: Completed Specimen: Abscess from Back Updated: 02/25/22 0957     Aerobic Bacterial Culture No growth    Narrative:      Epidural purulence #1    Culture, Anaerobe [[id number]] Collected: 02/21/22 1539    Order Status: Completed Specimen: Abscess from Back Updated: 02/25/22 0728     Anaerobic Culture No anaerobes isolated    Narrative:      Epidural purulence #2    Culture, Anaerobe [[id number]] Collected: 02/21/22 1539    Order Status: Completed Specimen: Abscess from Back Updated: 02/25/22 0728     Anaerobic Culture No anaerobes isolated    Narrative:      Epidural purulence #1    Aerobic culture [[id number]] Collected: 02/21/22 1539    Order Status: Completed Specimen: Abscess from Back Updated: 02/24/22 1124     Aerobic Bacterial Culture No growth    Narrative:      Epidural purulence #2    AFB Culture & Smear [[id number]] Collected: 02/21/22 1539    Order Status: Completed Specimen: Abscess from Back Updated: 02/22/22 2127     AFB Culture & Smear Culture in progress     AFB CULTURE STAIN No acid fast bacilli seen.    Narrative:      Epidural purulence #1    AFB Culture & Smear [[id number]] Collected: 02/21/22 1539    Order Status: Completed Specimen: Abscess from Back Updated: 02/22/22 2127     AFB Culture & Smear Culture in progress     AFB CULTURE STAIN No acid fast bacilli seen.    Narrative:      Epidural purulence #2    Culture, Anaerobic [[id number]] Collected: 02/15/22 1128    Order Status: Completed Specimen: Biopsy from Back Updated: 02/22/22 0656     Anaerobic Culture No  anaerobes isolated    Narrative:      L3-4 / L4-5 osteodiscitis    Gram stain [[id number]] Collected: 02/21/22 1539    Order Status: Completed Specimen: Abscess from Back Updated: 02/21/22 1753     Gram Stain Result No organisms seen      Few WBC's    Narrative:      Epidural purulence #2    Gram stain [[id number]] Collected: 02/21/22 1539    Order Status: Completed Specimen: Abscess from Back Updated: 02/21/22 1751     Gram Stain Result No organisms seen      Few WBC's    Narrative:      Epidural purulence #1    Fungus culture [[id number]] Collected: 02/21/22 1539    Order Status: Sent Specimen: Abscess from Back Updated: 02/21/22 1657    Fungus culture [[id number]] Collected: 02/21/22 1539    Order Status: Sent Specimen: Abscess from Back Updated: 02/21/22 1656    Fungus culture [[id number]] Collected: 02/15/22 1128    Order Status: Completed Specimen: Biopsy from Back Updated: 02/21/22 1150     Fungus (Mycology) Culture Culture in progress    Narrative:      L3-4 / L4-5 osteodiscitis          All pertinent labs from the last 24 hours have been reviewed.    Significant Diagnostics:  I have reviewed and interpreted all pertinent imaging results/findings within the past 24 hours.I

## 2022-02-27 NOTE — ASSESSMENT & PLAN NOTE
Rachel Zazueta is a 41 F with PMH hepatitis C, cirrhosis, pancytopenia, nicotine abuse, hx IVDU (last use 2 years ago), strep agalactae bacteremia, s/p L3-5 TLIF on 4/16/2021 who presents with persistent and progressive lumbar spondylodiscitis with resultant hardware failure and new scoliosis.     S/p hardware removal on 2/21.    --Patient admitted to [facility] on telemetry.      -q4h neurochecks on floor.  --Xray with TLSO brace in place completed and reviewed.  --XR lumbar spine flex/ext completed and reviewed.  --All labs and diagnostics personally reviewed  --Follow-up MRI Brain, C/T/L w/wo contrast:   -No evidence of osteomyelitis, discitis, or epidural abscess within the cervical or thoracic spine   -No diffusion positive or abnormal enhancement within the brain   -MRI brain shows patchy nonspecific increased T2 and FLAIR signal in the periventricular and subcortical white matter bilaterally. Could be due to chronic microvascular ischemic changes vs demyelinating disease given patient's age. No evidence of active demyelination.   -DDD C4-7 with mild to moderate canal stenosis  --Full infectious w/u.   -ESR 57, CRP 29, Procal 0.05, Lactate 1.1, BCx 2/14 NGTD, UA/Ucx pending. Wound Cx pending.   -F/u ID recs - now on Rocephin. PICC placed.   --S/p L3-4 disc biopsy and L3 bone biopsy with IR 2/15 - Gram stain rare WBC. Cx NGTD.  -- consult for risk stratification and medical optimization. Appreciate assistance. RCRI 0, 3.9% risk of perioperative cardiac complication.   --Utox + for amphetamines, addiction psych consulted per .   --Hold anti-plt/coag medications.  --Monitor for urinary retention - voiding spontaneously. Bladder scan prn.  --Pain control: Oxy 10 q4h PRN for moderate pain, Oxy 15 q4h PRN for severe pain, Dilaudid 1 mg q4h PRN when pain not relieved by PO meds. Continue lyrica 50 mg TID.  --Continue to monitor clinically, notify NSGY immediately with any changes in neuro status  --Drains: Keep,  continue IV abx while in place. High output, will switch to gravity.  --H&H 8 yesterday s/p 1u prbc 2/25. Continue to monitor.  --DVT ppx: TEDs/SCDs/SQH  --Bowel regimen: Miralax, senna, lactulose 2/23. + BM.  --Activity: PT/OT OOB, TLSO brace when OOB    Discussed with Dr. Templeton.    Dispo: Likely OR on 3/2/22 for reinstrumentation.

## 2022-02-27 NOTE — PROGRESS NOTES
[facility] - Neurosurgery ([facility])  Neurosurgery  Progress Note    Subjective:     History of Present Illness: Rachel Zazueta is a 41 y.o.  female with PMHx of Hepatitis C, liver cirrhosis, pancytopenia, and polysubstance abuse, who presents to clinic for follow up with new imaging s/p L3 and L4 laminectomy for evacuation of epidural abscess and L3-L5 TLIF on 04/16/2021.     The pt c/o worsening back and left leg pain since October. She states that she is no longer using IV drugs. States only using marijuana. Describes the left leg pain as a shock down the leg when standing for 10 minutes. Denies taking any antibiotics. Endorses new weakness in the legs. Denies b/b dysfunction. Denies new neck pain. She ambulates with a walker at home. States that she smoke.    She presents to ED as direct admit from clinic.       Post-Op Info:  Procedure(s) (LRB):  REMOVAL, HARDWARE, SPINE (N/A)   6 Days Post-Op     Interval History:     2/27: NAEON, neurostable, drain output 110/80. Some minor leakage from drain site. Pending OR 3/2    Medications:  Continuous Infusions:   sodium chloride 0.9% 10 mL/hr at 02/27/22 0305     Scheduled Meds:   acetaminophen  1,000 mg Oral Q8H    cefTRIAXone (ROCEPHIN) IVPB  2 g Intravenous Q12H    heparin (porcine)  5,000 Units Subcutaneous Q8H    pantoprazole  40 mg Oral Daily    pregabalin  50 mg Oral TID    senna-docusate 8.6-50 mg  1 tablet Oral Daily    sertraline  50 mg Oral Daily    sodium chloride 0.9%  10 mL Intravenous Q6H     PRN Meds:sodium chloride, aluminum-magnesium hydroxide-simethicone, dextrose 10%, dextrose 10%, glucagon (human recombinant), glucose, glucose, HYDROmorphone, melatonin, oxyCODONE, oxyCODONE, polyethylene glycol, Flushing PICC Protocol **AND** sodium chloride 0.9% **AND** sodium chloride 0.9%       Objective:     Weight: 133.8 kg (294 lb 15.6 oz)  Body mass index is 46.2 kg/m².  Vital Signs (Most Recent):  Temp: 96.3 °F (35.7 °C) (02/27/22 0926)  Pulse:  97 (02/27/22 0926)  Resp: 16 (02/27/22 0938)  BP: (!) 111/53 (02/27/22 0926)  SpO2: 95 % (02/27/22 0926)   Vital Signs (24h Range):  Temp:  [96.3 °F (35.7 °C)-98.5 °F (36.9 °C)] 96.3 °F (35.7 °C)  Pulse:  [] 97  Resp:  [16-19] 16  SpO2:  [94 %-100 %] 95 %  BP: (108-120)/(53-65) 111/53                          Closed/Suction Drain 02/21/22 1644 Left;Medial Back Accordion 10 Fr. (Active)   Site Description Unable to view 02/25/22 0800   Dressing Type Transparent (Tegaderm) 02/25/22 0800   Dressing Status Clean;Dry;Intact 02/25/22 0800   Dressing Intervention Integrity maintained 02/25/22 0400   Drainage Dark red 02/25/22 0800   Status Open to gravity drainage 02/25/22 0400   Output (mL) 120 mL 02/24/22 2000            Closed/Suction Drain 02/21/22 1645 Right;Medial Back Accordion 10 Fr. (Active)   Site Description Unable to view 02/25/22 0800   Dressing Type Transparent (Tegaderm) 02/25/22 0800   Dressing Status Clean;Intact;Dry 02/25/22 0800   Dressing Intervention Integrity maintained 02/25/22 0800   Drainage Dark red 02/25/22 0800   Status Open to gravity drainage 02/25/22 0400   Output (mL) 20 mL 02/24/22 2000       Female External Urinary Catheter 02/22/22 2300 (Active)   Skin no redness;no breakdown 02/25/22 0800   Tolerance no signs/symptoms of discomfort 02/25/22 0800   Suction Continuous suction at 70 mmHg 02/24/22 2000   Date of last wick change 02/24/22 02/24/22 2000   Time of last wick change 2000 02/24/22 2000   Output (mL) 800 mL 02/23/22 0800     Neurosurgery Physical Exam  General: Well developed, well nourished, not in acute distress.   Head: Normocephalic, atraumatic.  Neck: Full ROM.   Neurologic: Alert and oriented x 4. Thought content appropriate.  GCS: Motor:6  Verbal:5  Eyes:4   GCS Total: 15  Language: No aphasia.  Speech: No dysarthria.  Cranial nerves: Face symmetric, tongue midline, CN II-XII grossly intact.   Eyes: Pupils equal, round, reactive to light with accomodation, EOMI.    Pulmonary: Normal respirations, no signs of respiratory distress.  Abdomen: Soft, non-distended, non-tender to palpation.  Sensory: Intact to light touch throughout.  Motor Strength: Moves all extremities spontaneously with good tone. No abnormal movements seen.      Strength   Deltoids Triceps Biceps Wrist Extension Wrist Flexion Hand    Upper: R 5/5 5/5 5/5 5/5 5/5 5/5     L 5/5 5/5 5/5 5/5 5/5 5/5       Hip Flexion Knee Extension Knee  Flexion Dorsiflexion Plantar flexion EHL   Lower: R 5/5 5/5 5/5 5/5 5/5 5/5     L 4/5 4/5 4/5 5/5 5/5 5/5      Parker: Absent.  Clonus: 3 beats bilaterally.  Skin: Skin is warm, dry and intact.     Incision c/d/I with WV in place with good seal. No surrounding erythema or edema. No drainage from incision. No palpable hematoma or underlying fluid collection.    HV drains in place.     Significant Labs:  Recent Labs   Lab 02/26/22 0338 02/27/22 0259   * 113*    138   K 3.7 3.7    105   CO2 28 28   BUN 17 18   CREATININE 0.6 0.6   CALCIUM 7.7* 7.6*     Recent Labs   Lab 02/26/22 0338 02/27/22 0259   WBC 2.42* 1.93*   HGB 7.7* 8.1*   HCT 24.2* 25.9*   PLT 73* 71*     Recent Labs   Lab 02/26/22 0338 02/27/22 0259   INR 1.2 1.3*     Microbiology Results (last 7 days)       Procedure Component Value Units Date/Time    Aerobic culture [[id number]] Collected: 02/21/22 1539    Order Status: Completed Specimen: Abscess from Back Updated: 02/25/22 0957     Aerobic Bacterial Culture No growth    Narrative:      Epidural purulence #1    Culture, Anaerobe [[id number]] Collected: 02/21/22 1539    Order Status: Completed Specimen: Abscess from Back Updated: 02/25/22 0728     Anaerobic Culture No anaerobes isolated    Narrative:      Epidural purulence #2    Culture, Anaerobe [[id number]] Collected: 02/21/22 1539    Order Status: Completed Specimen: Abscess from Back Updated: 02/25/22 0728     Anaerobic Culture No anaerobes isolated    Narrative:      Epidural purulence  #1    Aerobic culture [[id number]] Collected: 02/21/22 1539    Order Status: Completed Specimen: Abscess from Back Updated: 02/24/22 1124     Aerobic Bacterial Culture No growth    Narrative:      Epidural purulence #2    AFB Culture & Smear [[id number]] Collected: 02/21/22 1539    Order Status: Completed Specimen: Abscess from Back Updated: 02/22/22 2127     AFB Culture & Smear Culture in progress     AFB CULTURE STAIN No acid fast bacilli seen.    Narrative:      Epidural purulence #1    AFB Culture & Smear [[id number]] Collected: 02/21/22 1539    Order Status: Completed Specimen: Abscess from Back Updated: 02/22/22 2127     AFB Culture & Smear Culture in progress     AFB CULTURE STAIN No acid fast bacilli seen.    Narrative:      Epidural purulence #2    Culture, Anaerobic [[id number]] Collected: 02/15/22 1128    Order Status: Completed Specimen: Biopsy from Back Updated: 02/22/22 0656     Anaerobic Culture No anaerobes isolated    Narrative:      L3-4 / L4-5 osteodiscitis    Gram stain [[id number]] Collected: 02/21/22 1539    Order Status: Completed Specimen: Abscess from Back Updated: 02/21/22 1753     Gram Stain Result No organisms seen      Few WBC's    Narrative:      Epidural purulence #2    Gram stain [[id number]] Collected: 02/21/22 1539    Order Status: Completed Specimen: Abscess from Back Updated: 02/21/22 1751     Gram Stain Result No organisms seen      Few WBC's    Narrative:      Epidural purulence #1    Fungus culture [[id number]] Collected: 02/21/22 1539    Order Status: Sent Specimen: Abscess from Back Updated: 02/21/22 1657    Fungus culture [[id number]] Collected: 02/21/22 1539    Order Status: Sent Specimen: Abscess from Back Updated: 02/21/22 1656    Fungus culture [[id number]] Collected: 02/15/22 1128    Order Status: Completed Specimen: Biopsy from Back Updated: 02/21/22 1150     Fungus (Mycology) Culture Culture in progress    Narrative:      L3-4 / L4-5 osteodiscitis          All pertinent labs  from the last 24 hours have been reviewed.    Significant Diagnostics:  I have reviewed and interpreted all pertinent imaging results/findings within the past 24 hours.I    Assessment/Plan:     * Spondylodiscitis  Rachel Zazueta is a 41 F with PMH hepatitis C, cirrhosis, pancytopenia, nicotine abuse, hx IVDU (last use 2 years ago), strep agalactae bacteremia, s/p L3-5 TLIF on 4/16/2021 who presents with persistent and progressive lumbar spondylodiscitis with resultant hardware failure and new scoliosis.     S/p hardware removal on 2/21.    --Patient admitted to NPU on telemetry.      -q4h neurochecks on floor.  --Xray with TLSO brace in place completed and reviewed.  --XR lumbar spine flex/ext completed and reviewed.  --All labs and diagnostics personally reviewed  --Follow-up MRI Brain, C/T/L w/wo contrast:   -No evidence of osteomyelitis, discitis, or epidural abscess within the cervical or thoracic spine   -No diffusion positive or abnormal enhancement within the brain   -MRI brain shows patchy nonspecific increased T2 and FLAIR signal in the periventricular and subcortical white matter bilaterally. Could be due to chronic microvascular ischemic changes vs demyelinating disease given patient's age. No evidence of active demyelination.   -DDD C4-7 with mild to moderate canal stenosis  --Full infectious w/u.   -ESR 57, CRP 29, Procal 0.05, Lactate 1.1, BCx 2/14 NGTD, UA/Ucx pending. Wound Cx pending.   -F/u ID recs - now on Rocephin. PICC placed.   --S/p L3-4 disc biopsy and L3 bone biopsy with IR 2/15 - Gram stain rare WBC. Cx NGTD.  -- consult for risk stratification and medical optimization. Appreciate assistance. RCRI 0, 3.9% risk of perioperative cardiac complication.   --Utox + for amphetamines, addiction psych consulted per .   --Hold anti-plt/coag medications.  --Monitor for urinary retention - voiding spontaneously. Bladder scan prn.  --Pain control: Oxy 10 q4h PRN for moderate pain, Oxy 15 q4h PRN  for severe pain, Dilaudid 1 mg q4h PRN when pain not relieved by PO meds. Continue lyrica 50 mg TID.  --Continue to monitor clinically, notify NSGY immediately with any changes in neuro status  --Drains: Keep, continue IV abx while in place. High output, will switch to gravity.  --H&H 8 yesterday s/p 1u prbc 2/25. Continue to monitor.  --DVT ppx: TEDs/SCDs/SQH  --Bowel regimen: Miralax, senna, lactulose 2/23. + BM.  --Activity: PT/OT OOB, TLSO brace when OOB    Discussed with Dr. Templeton.    Dispo: Likely OR on 3/2/22 for reinstrumentation.        Mabel Chang MD  Neurosurgery  [facility] - Neurosurgery ([facility])

## 2022-02-27 NOTE — NURSING
0409-During rounding bleeding noted again coming from incisional site of rt hemovac.  Area again cleaned/dried and dressing reinforced.  Team paged to notify.  Awaiting c/b.    6768- Lab also called to report critical WBC=1.93.      5155- Received c/b from MD.  Notified of above.  No new orders at this time.  States team will assess during rounding.

## 2022-02-28 ENCOUNTER — ANESTHESIA EVENT (OUTPATIENT)
Dept: SURGERY | Facility: HOSPITAL | Age: 42
DRG: 460 | End: 2022-02-28
Payer: MEDICAID

## 2022-02-28 LAB
ALBUMIN SERPL BCP-MCNC: 1.8 G/DL (ref 3.5–5.2)
ALP SERPL-CCNC: 82 U/L (ref 55–135)
ALT SERPL W/O P-5'-P-CCNC: 19 U/L (ref 10–44)
ANION GAP SERPL CALC-SCNC: 2 MMOL/L (ref 8–16)
AST SERPL-CCNC: 46 U/L (ref 10–40)
BASOPHILS # BLD AUTO: 0.01 K/UL (ref 0–0.2)
BASOPHILS NFR BLD: 0.6 % (ref 0–1.9)
BILIRUB SERPL-MCNC: 0.8 MG/DL (ref 0.1–1)
BUN SERPL-MCNC: 18 MG/DL (ref 6–20)
CALCIUM SERPL-MCNC: 7.8 MG/DL (ref 8.7–10.5)
CHLORIDE SERPL-SCNC: 107 MMOL/L (ref 95–110)
CO2 SERPL-SCNC: 31 MMOL/L (ref 23–29)
CREAT SERPL-MCNC: 0.5 MG/DL (ref 0.5–1.4)
DIFFERENTIAL METHOD: ABNORMAL
EOSINOPHIL # BLD AUTO: 0.1 K/UL (ref 0–0.5)
EOSINOPHIL NFR BLD: 6.7 % (ref 0–8)
ERYTHROCYTE [DISTWIDTH] IN BLOOD BY AUTOMATED COUNT: 18.1 % (ref 11.5–14.5)
EST. GFR  (AFRICAN AMERICAN): >60 ML/MIN/1.73 M^2
EST. GFR  (NON AFRICAN AMERICAN): >60 ML/MIN/1.73 M^2
GLUCOSE SERPL-MCNC: 88 MG/DL (ref 70–110)
HCT VFR BLD AUTO: 24.2 % (ref 37–48.5)
HGB BLD-MCNC: 7.6 G/DL (ref 12–16)
IMM GRANULOCYTES # BLD AUTO: 0 K/UL (ref 0–0.04)
IMM GRANULOCYTES NFR BLD AUTO: 0 % (ref 0–0.5)
INR PPP: 1.3 (ref 0.8–1.2)
LYMPHOCYTES # BLD AUTO: 0.4 K/UL (ref 1–4.8)
LYMPHOCYTES NFR BLD: 25.8 % (ref 18–48)
MCH RBC QN AUTO: 29.3 PG (ref 27–31)
MCHC RBC AUTO-ENTMCNC: 31.4 G/DL (ref 32–36)
MCV RBC AUTO: 93 FL (ref 82–98)
MONOCYTES # BLD AUTO: 0.1 K/UL (ref 0.3–1)
MONOCYTES NFR BLD: 8.6 % (ref 4–15)
NEUTROPHILS # BLD AUTO: 1 K/UL (ref 1.8–7.7)
NEUTROPHILS NFR BLD: 58.3 % (ref 38–73)
NRBC BLD-RTO: 0 /100 WBC
PLATELET # BLD AUTO: 78 K/UL (ref 150–450)
PLATELET BLD QL SMEAR: ABNORMAL
PMV BLD AUTO: 10 FL (ref 9.2–12.9)
POTASSIUM SERPL-SCNC: 3.9 MMOL/L (ref 3.5–5.1)
PROT SERPL-MCNC: 5.3 G/DL (ref 6–8.4)
PROTHROMBIN TIME: 13.1 SEC (ref 9–12.5)
RBC # BLD AUTO: 2.59 M/UL (ref 4–5.4)
SODIUM SERPL-SCNC: 140 MMOL/L (ref 136–145)
WBC # BLD AUTO: 1.63 K/UL (ref 3.9–12.7)

## 2022-02-28 PROCEDURE — 25000003 PHARM REV CODE 250: Performed by: STUDENT IN AN ORGANIZED HEALTH CARE EDUCATION/TRAINING PROGRAM

## 2022-02-28 PROCEDURE — 25000003 PHARM REV CODE 250

## 2022-02-28 PROCEDURE — 85610 PROTHROMBIN TIME: CPT

## 2022-02-28 PROCEDURE — 25000003 PHARM REV CODE 250: Performed by: NEUROLOGICAL SURGERY

## 2022-02-28 PROCEDURE — 63600175 PHARM REV CODE 636 W HCPCS: Performed by: STUDENT IN AN ORGANIZED HEALTH CARE EDUCATION/TRAINING PROGRAM

## 2022-02-28 PROCEDURE — 25000003 PHARM REV CODE 250: Performed by: PHYSICIAN ASSISTANT

## 2022-02-28 PROCEDURE — 99024 POSTOP FOLLOW-UP VISIT: CPT | Mod: ,,,

## 2022-02-28 PROCEDURE — A4216 STERILE WATER/SALINE, 10 ML: HCPCS | Performed by: NEUROLOGICAL SURGERY

## 2022-02-28 PROCEDURE — 11000001 HC ACUTE MED/SURG PRIVATE ROOM

## 2022-02-28 PROCEDURE — 85025 COMPLETE CBC W/AUTO DIFF WBC: CPT | Performed by: STUDENT IN AN ORGANIZED HEALTH CARE EDUCATION/TRAINING PROGRAM

## 2022-02-28 PROCEDURE — 63600175 PHARM REV CODE 636 W HCPCS: Performed by: PHYSICIAN ASSISTANT

## 2022-02-28 PROCEDURE — 97530 THERAPEUTIC ACTIVITIES: CPT

## 2022-02-28 PROCEDURE — 99024 PR POST-OP FOLLOW-UP VISIT: ICD-10-PCS | Mod: ,,,

## 2022-02-28 PROCEDURE — 80053 COMPREHEN METABOLIC PANEL: CPT

## 2022-02-28 RX ADMIN — ACETAMINOPHEN 1000 MG: 500 TABLET ORAL at 09:02

## 2022-02-28 RX ADMIN — Medication 10 ML: at 06:02

## 2022-02-28 RX ADMIN — PREGABALIN 50 MG: 50 CAPSULE ORAL at 03:02

## 2022-02-28 RX ADMIN — HEPARIN SODIUM 5000 UNITS: 5000 INJECTION INTRAVENOUS; SUBCUTANEOUS at 08:02

## 2022-02-28 RX ADMIN — HEPARIN SODIUM 5000 UNITS: 5000 INJECTION INTRAVENOUS; SUBCUTANEOUS at 06:02

## 2022-02-28 RX ADMIN — CEFTRIAXONE 2 G: 2 INJECTION, POWDER, FOR SOLUTION INTRAMUSCULAR; INTRAVENOUS at 03:02

## 2022-02-28 RX ADMIN — PANTOPRAZOLE SODIUM 40 MG: 20 TABLET, DELAYED RELEASE ORAL at 09:02

## 2022-02-28 RX ADMIN — HYDROMORPHONE HYDROCHLORIDE 1 MG: 1 INJECTION, SOLUTION INTRAMUSCULAR; INTRAVENOUS; SUBCUTANEOUS at 09:02

## 2022-02-28 RX ADMIN — Medication 10 ML: at 03:02

## 2022-02-28 RX ADMIN — OXYCODONE HYDROCHLORIDE 15 MG: 10 TABLET ORAL at 08:02

## 2022-02-28 RX ADMIN — SENNOSIDES AND DOCUSATE SODIUM 1 TABLET: 50; 8.6 TABLET ORAL at 09:02

## 2022-02-28 RX ADMIN — HEPARIN SODIUM 5000 UNITS: 5000 INJECTION INTRAVENOUS; SUBCUTANEOUS at 03:02

## 2022-02-28 RX ADMIN — OXYCODONE HYDROCHLORIDE 15 MG: 10 TABLET ORAL at 06:02

## 2022-02-28 RX ADMIN — HYDROMORPHONE HYDROCHLORIDE 1 MG: 1 INJECTION, SOLUTION INTRAMUSCULAR; INTRAVENOUS; SUBCUTANEOUS at 10:02

## 2022-02-28 RX ADMIN — SERTRALINE HYDROCHLORIDE 50 MG: 50 TABLET ORAL at 09:02

## 2022-02-28 RX ADMIN — PREGABALIN 50 MG: 50 CAPSULE ORAL at 08:02

## 2022-02-28 RX ADMIN — OXYCODONE HYDROCHLORIDE 15 MG: 10 TABLET ORAL at 03:02

## 2022-02-28 RX ADMIN — PREGABALIN 50 MG: 50 CAPSULE ORAL at 09:02

## 2022-02-28 RX ADMIN — ACETAMINOPHEN 1000 MG: 500 TABLET ORAL at 03:02

## 2022-02-28 RX ADMIN — ACETAMINOPHEN 1000 MG: 500 TABLET ORAL at 06:02

## 2022-02-28 RX ADMIN — CEFTRIAXONE 2 G: 2 INJECTION, POWDER, FOR SOLUTION INTRAMUSCULAR; INTRAVENOUS at 02:02

## 2022-02-28 NOTE — ANESTHESIA PREPROCEDURE EVALUATION
[facility]  Anesthesia Pre-Operative Evaluation         Patient Name: Rachel Zazueta  YOB: 1980  MRN: [id number]    SUBJECTIVE:     Pre-operative evaluation for Procedure(s) (LRB):  **AIRO**FUSION, SPINE, LUMBAR T10-PELVIS Depuy, SNS (Bilateral)     02/28/2022    Rachel Zazueta is a 41 y.o. female w/ a significant PMHx of HCV cirrhosis, morbid obesity (BMI 46), and IVDU. She originally had an epidural abscess status post L3-4 laminectomy and L3-5 TLIF in 4/2021. She presented to clinic with progressive lumbar sponylodiscitis and hardware failure. She is s/p AIRO fusion on 2/21/2022. NSGY planning for further operative intervention on 3/2/2022.    Patient now presents for the above procedure(s).      LDA:  PICC Double Lumen 02/22/22 1110 right basilic (Active)   $ PICC Line Charges Line Placement < 5 years (no subq port/pump or image guidance) 02/23/22 0800   Verification by X-ray Yes 02/27/22 0701   Site Assessment No redness 02/27/22 0701   Extremity Assessment Distal to IV No abnormal discoloration;No swelling;No redness;No warmth 02/26/22 2000   Line Securement Device Secured with sutureless device 02/27/22 0701   Dressing Type Biopatch in place 02/27/22 2048   Dressing Status Dry;Clean;Intact 02/27/22 2048   Dressing Intervention Integrity maintained 02/27/22 2048   Date on Dressing 02/22/22 02/26/22 2000   Dressing Due to be Changed 03/01/22 02/26/22 2000   Left Lumen Patency/Care Normal saline locked 02/27/22 2048   Right Lumen Patency/Care Normal saline locked 02/27/22 2048   Current Insertion Depth (cm) 39 cm 02/22/22 1105   Current Exposed Catheter (cm) 0 cm 02/22/22 1105   Extremity Circumference (cm) 40 cm 02/22/22 1105   Waveform Normal 02/22/22 1105   Line Necessity Review Medication caustic to vasculature;Longterm central access required 02/26/22 2000   Number of days: 5            Peripheral IV - Single Lumen 02/21/22 1824 20 G Anterior;Left;Proximal Forearm  (Active)   Site Assessment Clean;Dry;No redness 02/27/22 1700   Extremity Assessment Distal to IV No abnormal discoloration 02/27/22 1700   Line Status Saline locked;Flushed 02/26/22 2000   Dressing Status Clean;Dry;Intact 02/26/22 2000   Dressing Intervention Integrity maintained 02/26/22 2000   Dressing Change Due 02/25/22 02/25/22 0400   Site Change Due 02/25/22 02/24/22 2000   Reason Not Rotated Not due 02/25/22 0400   Number of days: 6            Closed/Suction Drain 02/21/22 1644 Left;Medial Back Accordion 10 Fr. (Active)   Site Description Unable to view 02/27/22 2048   Dressing Type Transparent (Tegaderm) 02/27/22 2048   Dressing Status Intact 02/27/22 2048   Dressing Intervention Integrity maintained 02/27/22 2048   Drainage Dark red 02/26/22 1200   Status Other (Comment) 02/26/22 2000   Output (mL) 0 mL 02/28/22 0600   Number of days: 6            Closed/Suction Drain 02/21/22 1645 Right;Medial Back Accordion 10 Fr. (Active)   Site Description Unable to view 02/27/22 2048   Dressing Type Transparent (Tegaderm) 02/27/22 2048   Dressing Status Intact 02/27/22 2048   Dressing Intervention Integrity maintained 02/27/22 2048   Drainage Dark red 02/26/22 1200   Status Other (Comment) 02/26/22 2000   Output (mL) 100 mL 02/28/22 0600   Number of days: 6       Female External Urinary Catheter 02/22/22 2300 (Active)   Skin no redness;no breakdown 02/27/22 2048   Tolerance no signs/symptoms of discomfort 02/27/22 2048   Suction Continuous suction at 40 mmHg 02/27/22 2048   Date of last wick change 02/26/22 02/26/22 2000   Time of last wick change 2000 02/26/22 2000   Output (mL) 500 mL 02/27/22 0701   Number of days: 5     Prev airway: 02/21/22; Placement Time: 1353 (created via procedure documentation); Method of Intubation: Video Laryngoscopy; Mask Ventilation: Easy; Intubated: Postinduction; Blade: Su #3; Airway Device Size: 7.0    Drips:    sodium chloride 0.9% 10 mL/hr at 02/27/22 3488       Patient Active  Problem List   Diagnosis    Pancytopenia    Cirrhosis of liver without ascites    Chronic hepatitis C with cirrhosis    Severe anemia    Substance use disorder    Abscess in epidural space of lumbar spine    Spondylodiscitis    Spinal stenosis at L4-L5 level    Thrombocytopenia    Constipation    Pre-op evaluation    Tobacco use disorder    Amphetamine use disorder, severe    Cannabis use disorder, moderate, dependence    Gram-positive cocci bacteremia    Streptococcal bacteremia    Epidural abscess    Influenza vaccine administered    Encounter for screening for malignant neoplasm of breast    Chronic midline low back pain with sciatica    Encounter for lipid screening for cardiovascular disease    BMI 45.0-49.9, adult    Mild episode of recurrent major depressive disorder    Abnormal LFTs    Coagulopathy    Hyperbilirubinemia    Discitis of lumbar region       Review of patient's allergies indicates:   Allergen Reactions    Bee venom protein (honey bee) Anaphylaxis     Patient reports she is allergic to bee stings.    Wasp sting [allergen ext-venom-honey bee] Anaphylaxis    Adhesive Blisters    Strawberry Hives     Patient reports itching and hives    Iodine and iodide containing products Hives    Naproxen Other (See Comments)     Throat closing    Shellfish containing products     Nuts [tree nut] Rash       Current Inpatient Medications:   acetaminophen  1,000 mg Oral Q8H    cefTRIAXone (ROCEPHIN) IVPB  2 g Intravenous Q12H    heparin (porcine)  5,000 Units Subcutaneous Q8H    pantoprazole  40 mg Oral Daily    pregabalin  50 mg Oral TID    senna-docusate 8.6-50 mg  1 tablet Oral Daily    sertraline  50 mg Oral Daily    sodium chloride 0.9%  10 mL Intravenous Q6H       No current facility-administered medications on file prior to encounter.     Current Outpatient Medications on File Prior to Encounter   Medication Sig Dispense Refill    diclofenac sodium (VOLTAREN) 1 %  Gel Apply 2 g topically 4 (four) times daily. 50 g 0    polyethylene glycol (GLYCOLAX) 17 gram/dose powder Take 17 g by mouth once daily. 510 g 0    pregabalin (LYRICA) 50 MG capsule Take 1 capsule (50 mg total) by mouth 3 (three) times daily. 90 capsule 6    sertraline (ZOLOFT) 25 MG tablet Take 1 tablet (25 mg total) by mouth once daily. 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 OR [address] [place] [address];  Service: Neurosurgery;  Laterality: N/A;  [place]       Social History:  Tobacco Use: High Risk    Smoking Tobacco Use: Current Some Day Smoker    Smokeless Tobacco Use: Never Used      Alcohol Use: Not At Risk    Frequency of Alcohol Consumption: Never    Average Number of Drinks: 1 or 2    Frequency of Binge Drinking: Never        OBJECTIVE:     Vital Signs Range (Last 24H):  Temp:  [36.3 °C (97.3 °F)-37.1 °C (98.8 °F)]   Pulse:  [85-99]   Resp:  [16-20]   BP: (107-119)/(54-63)   SpO2:  [96 %-99 %]       Significant Labs:  Lab Results   Component Value Date    WBC 1.63 (LL) 02/28/2022    HGB 7.6 (L) 02/28/2022    HCT 24.2 (L) 02/28/2022    PLT 78 (L) 02/28/2022    CHOL 68 (L) 02/07/2022    TRIG 50 02/07/2022    HDL 27 (L) 02/07/2022    ALT 19 02/28/2022    AST 46 (H) 02/28/2022     02/28/2022    K 3.9 02/28/2022     02/28/2022    CREATININE 0.5 02/28/2022    BUN 18 02/28/2022    CO2 31 (H) 02/28/2022    TSH 1.255 04/16/2021    INR 1.3 (H) 02/28/2022    HGBA1C 4.6 04/16/2021       Diagnostic Studies: No relevant studies.    EKG:   Results for orders placed or performed during the hospital encounter of 02/14/22   EKG 12-lead    Collection Time: 02/23/22  8:11 AM    Narrative    Test Reason : R00.0,    Vent. Rate : 108 BPM     Atrial Rate : 108  "BPM     P-R Int : 150 ms          QRS Dur : 098 ms      QT Int : 366 ms       P-R-T Axes : 149 138 118 degrees     QTc Int : 490 ms     Suspect arm lead reversal, interpretation assumes no reversal  Sinus tachycardia  Left posterior fascicular block , due to lead misplacemet  Abnormal ECG  When compared with ECG of 15-FEB-2022 14:31,  Inverted T waves have replaced nonspecific T wave abnormality in Inferior  leads  Confirmed by RAUL BO MD ([id number]) on 2/23/2022 12:35:54 PM    Referred By: [email or web address]   SELF           Confirmed By:RAUL BO MD       2D ECHO:  TTE:  Results for orders placed or performed during the hospital encounter of 02/14/22   Echo   Result Value Ref Range    AV mean gradient 6 mmHg    Ao peak coty 1.48 m/s    Ao VTI 28.26 cm    IVS 0.71 0.6 - 1.1 cm    LA size 4.65 cm    Left Atrium Major Axis 5.59 cm    Left Atrium Minor Axis 5.61 cm    LVIDd 5.66 3.5 - 6.0 cm    LVIDs 3.63 2.1 - 4.0 cm    LVOT diameter 2.24 cm    LVOT peak VTI 25.84 cm    Posterior Wall 0.77 0.6 - 1.1 cm    MV Peak A Coty 1.03 m/s    E wave deceleration time 159.03 msec    MV Peak E Coty 1.04 m/s    PV Peak D Coty 0.63 m/s    PV Peak S Coty 0.91 m/s    RA Major Axis 5.30 cm    RA Width 3.01 cm    RVDD 2.82 cm    Sinus 3.12 cm    TAPSE 3.47 cm    TR Max Coty 2.52 m/s    TDI LATERAL 0.14 m/s    TDI SEPTAL 0.10 m/s    LA WIDTH 4.73 cm    MV stenosis pressure 1/2 time 46.12 ms    LV Diastolic Volume 157.34 mL    LV Systolic Volume 55.60 mL    RV S' 18.16 cm/s    LVOT peak coty 1.36 m/s    LA volume (mod) 92.69 cm3    MV "A" wave duration 9.42 msec    LV LATERAL E/E' RATIO 7.43 m/s    LV SEPTAL E/E' RATIO 10.40 m/s    FS 36 %    LA volume 104.69 cm3    LV mass 152.58 g    Left Ventricle Relative Wall Thickness 0.27 cm    AV valve area 3.60 cm2    AV Velocity Ratio 0.92     AV index (prosthetic) 0.91     MV valve area p 1/2 method 4.77 cm2    E/A ratio 1.01     Mean e' 0.12 m/s    Pulm vein S/D ratio 1.44     LVOT area 3.9 cm2    " LVOT stroke volume 101.78 cm3    AV peak gradient 9 mmHg    E/E' ratio 8.67 m/s    LV Systolic Volume Index 23.5 mL/m2    LV Diastolic Volume Index 66.39 mL/m2    LA Volume Index 44.2 mL/m2    LV Mass Index 64 g/m2    Triscuspid Valve Regurgitation Peak Gradient 25 mmHg    LA Volume Index (Mod) 39.1 mL/m2    BSA 2.49 m2    Right Atrial Pressure (from IVC) 3 mmHg    EF 60 %    TV rest pulmonary artery pressure 28 mmHg    Narrative    · The left ventricle is normal in size with normal systolic function. The   estimated ejection fraction is 60%.  · Normal right ventricular size with normal right ventricular systolic   function.  · Normal left ventricular diastolic function.  · Biatrial enlargement.  · The estimated PA systolic pressure is 28 mmHg.  · Normal central venous pressure (3 mmHg).          ROSA:  No results found for this or any previous visit.    ASSESSMENT/PLAN:           Pre-op Assessment    I have reviewed the Patient Summary Reports.     I have reviewed the Nursing Notes. I have reviewed the NPO Status.   I have reviewed the Medications.     Review of Systems  Anesthesia Hx:  No problems with previous Anesthesia Denies Hx of Anesthetic complications  History of prior surgery of interest to airway management or planning: Denies Family Hx of Anesthesia complications.   Denies Personal Hx of Anesthesia complications.   Social:  Smoker IVDU  Marijuana   Hematology/Oncology:     Oncology Normal     Cardiovascular:  Cardiovascular Normal  Denies Hypertension.   Denies Angina. ECG has been reviewed.    Pulmonary:  Pulmonary Normal  Denies Shortness of breath.  Denies Recent URI.    Renal/:  Renal/ Normal     Hepatic/GI:   Liver Disease, Hepatitis, C    Musculoskeletal:  Spine Disorders:    Neurological:  Neurology Normal  Denies CVA. Denies Seizures.    Endocrine:  Endocrine Normal Denies Diabetes.    Psych:   Psychiatric History          Physical Exam  General: Well nourished and  Cooperative    Airway:  Mallampati: II   Mouth Opening: Normal  TM Distance: Normal  Tongue: Normal  Neck ROM: Normal ROM    Dental:  Intact, Periodontal disease    Chest/Lungs:  Clear to auscultation, Normal Respiratory Rate    Heart:  Rate: Normal  Rhythm: Regular Rhythm        Anesthesia Plan  Type of Anesthesia, risks & benefits discussed:    Anesthesia Type: Gen ETT  Intra-op Monitoring Plan: Standard ASA Monitors and Art Line  Post Op Pain Control Plan: multimodal analgesia and IV/PO Opioids PRN  Induction:  IV  Airway Plan: Video, Post-Induction  Informed Consent: Informed consent signed with the Patient and all parties understand the risks and agree with anesthesia plan.  All questions answered. Patient consented to blood products? Yes  ASA Score: 3  Day of Surgery Review of History & Physical: I have interviewed and examined the patient. I have reviewed the patient's H&P dated:     Ready For Surgery From Anesthesia Perspective.     .

## 2022-02-28 NOTE — PLAN OF CARE
Problem: Adult Inpatient Plan of Care  Goal: Plan of Care Review  Outcome: Ongoing, Progressing     Problem: Bariatric Environmental Safety  Goal: Safety Maintained with Care  Outcome: Ongoing, Progressing     Problem: Fall Injury Risk  Goal: Absence of Fall and Fall-Related Injury  Outcome: Ongoing, Progressing     Problem: Skin Injury Risk Increased  Goal: Skin Health and Integrity  Outcome: Ongoing, Progressing     Problem: Infection  Goal: Absence of Infection Signs and Symptoms  Outcome: Ongoing, Progressing   POC reviewed and updated with the patient. VSS, see flow-sheets. Patient is AO X * 4 at this time. Fall/safety precautions maintained, no signs of injury noted during shift. Plan is to continue IV antibiotics and re instrumental placement on 3/2 . No acute signs of distress noted at this time.

## 2022-02-28 NOTE — SUBJECTIVE & OBJECTIVE
Interval History: NAEON. Neuro exam is stable. Reports continued back pain that radiates down her LLE. Denies new numbness or weakness. Continues to void spontaneously and has had Bms. Tolerating PO. H&H 7.6 & 24.2. Plt 78. Protime 13.2, INR 1.3. May need platelet transfusion prior to OR 3/2. WV removed, HV drains in place. Up in chair today with brace in place.     Medications:  Continuous Infusions:   sodium chloride 0.9% 10 mL/hr at 02/27/22 0305     Scheduled Meds:   acetaminophen  1,000 mg Oral Q8H    cefTRIAXone (ROCEPHIN) IVPB  2 g Intravenous Q12H    heparin (porcine)  5,000 Units Subcutaneous Q8H    pantoprazole  40 mg Oral Daily    pregabalin  50 mg Oral TID    senna-docusate 8.6-50 mg  1 tablet Oral Daily    sertraline  50 mg Oral Daily    sodium chloride 0.9%  10 mL Intravenous Q6H     PRN Meds:sodium chloride, aluminum-magnesium hydroxide-simethicone, dextrose 10%, dextrose 10%, glucagon (human recombinant), glucose, glucose, HYDROmorphone, melatonin, oxyCODONE, oxyCODONE, polyethylene glycol, Flushing PICC Protocol **AND** sodium chloride 0.9% **AND** sodium chloride 0.9%       Objective:     Weight: 133.8 kg (294 lb 15.6 oz)  Body mass index is 46.2 kg/m².  Vital Signs (Most Recent):  Temp: 96.9 °F (36.1 °C) (02/28/22 1111)  Pulse: 91 (02/28/22 1111)  Resp: 18 (02/28/22 1111)  BP: (!) 117/57 (02/28/22 1111)  SpO2: 99 % (02/28/22 1111)   Vital Signs (24h Range):  Temp:  [96.9 °F (36.1 °C)-98.8 °F (37.1 °C)] 96.9 °F (36.1 °C)  Pulse:  [85-99] 91  Resp:  [16-20] 18  SpO2:  [96 %-99 %] 99 %  BP: (107-119)/(54-63) 117/57                          Closed/Suction Drain 02/21/22 1644 Left;Medial Back Accordion 10 Fr. (Active)   Site Description Unable to view 02/27/22 2048   Dressing Type Transparent (Tegaderm) 02/27/22 2048   Dressing Status Intact 02/27/22 2048   Dressing Intervention Integrity maintained 02/27/22 2048   Drainage Dark red 02/26/22 1200   Status Other (Comment) 02/26/22 2000   Output (mL) 0  mL 02/28/22 0600            Closed/Suction Drain 02/21/22 1645 Right;Medial Back Accordion 10 Fr. (Active)   Site Description Unable to view 02/27/22 2048   Dressing Type Transparent (Tegaderm) 02/27/22 2048   Dressing Status Intact 02/27/22 2048   Dressing Intervention Integrity maintained 02/27/22 2048   Drainage Dark red 02/26/22 1200   Status Other (Comment) 02/26/22 2000   Output (mL) 100 mL 02/28/22 0600       Female External Urinary Catheter 02/22/22 2300 (Active)   Skin no redness;no breakdown 02/27/22 2048   Tolerance no signs/symptoms of discomfort 02/27/22 2048   Suction Continuous suction at 40 mmHg 02/27/22 2048   Date of last wick change 02/26/22 02/26/22 2000   Time of last wick change 2000 02/26/22 2000   Output (mL) 500 mL 02/27/22 0701     Neurosurgery Physical Exam  General: Well developed, well nourished, not in acute distress.   Head: Normocephalic, atraumatic.  Neck: Full ROM.   Neurologic: Alert and oriented x 4. Thought content appropriate.  GCS: Motor:6  Verbal:5  Eyes:4   GCS Total: 15  Language: No aphasia.  Speech: No dysarthria.  Cranial nerves: Face symmetric, tongue midline, CN II-XII grossly intact.   Eyes: Pupils equal, round, reactive to light with accomodation, EOMI.   Pulmonary: Normal respirations, no signs of respiratory distress.  Abdomen: Soft, non-distended, non-tender to palpation.  Sensory: Intact to light touch throughout.  Motor Strength: Moves all extremities spontaneously with good tone. No abnormal movements seen.      Strength   Deltoids Triceps Biceps Wrist Extension Wrist Flexion Hand    Upper: R 5/5 5/5 5/5 5/5 5/5 5/5     L 5/5 5/5 5/5 5/5 5/5 5/5       Hip Flexion Knee Extension Knee  Flexion Dorsiflexion Plantar flexion EHL   Lower: R 5/5 5/5 5/5 5/5 5/5 5/5     L 4/5 4/5 4/5 5/5 5/5 5/5      Parker: Absent.  Clonus: 3 beats bilaterally.  Skin: Skin is warm, dry and intact.     Incision c/d/i with staples. No surrounding erythema or edema. No drainage  from incision. No palpable hematoma or underlying fluid collection.     HV drains in place.       Significant Labs:  Recent Labs   Lab 02/27/22  0259 02/28/22  0614   * 88    140   K 3.7 3.9    107   CO2 28 31*   BUN 18 18   CREATININE 0.6 0.5   CALCIUM 7.6* 7.8*     Recent Labs   Lab 02/27/22  0259 02/28/22  0614   WBC 1.93* 1.63*   HGB 8.1* 7.6*   HCT 25.9* 24.2*   PLT 71* 78*     Recent Labs   Lab 02/27/22  0259 02/28/22  0614   INR 1.3* 1.3*     Microbiology Results (last 7 days)       Procedure Component Value Units Date/Time    Aerobic culture [[id number]] Collected: 02/21/22 1539    Order Status: Completed Specimen: Abscess from Back Updated: 02/25/22 0957     Aerobic Bacterial Culture No growth    Narrative:      Epidural purulence #1    Culture, Anaerobe [[id number]] Collected: 02/21/22 1539    Order Status: Completed Specimen: Abscess from Back Updated: 02/25/22 0728     Anaerobic Culture No anaerobes isolated    Narrative:      Epidural purulence #2    Culture, Anaerobe [[id number]] Collected: 02/21/22 1539    Order Status: Completed Specimen: Abscess from Back Updated: 02/25/22 0728     Anaerobic Culture No anaerobes isolated    Narrative:      Epidural purulence #1    Aerobic culture [[id number]] Collected: 02/21/22 1539    Order Status: Completed Specimen: Abscess from Back Updated: 02/24/22 1124     Aerobic Bacterial Culture No growth    Narrative:      Epidural purulence #2    AFB Culture & Smear [[id number]] Collected: 02/21/22 1539    Order Status: Completed Specimen: Abscess from Back Updated: 02/22/22 2127     AFB Culture & Smear Culture in progress     AFB CULTURE STAIN No acid fast bacilli seen.    Narrative:      Epidural purulence #1    AFB Culture & Smear [[id number]] Collected: 02/21/22 1539    Order Status: Completed Specimen: Abscess from Back Updated: 02/22/22 2127     AFB Culture & Smear Culture in progress     AFB CULTURE STAIN No acid fast bacilli seen.    Narrative:       Epidural purulence #2    Culture, Anaerobic [[id number]] Collected: 02/15/22 1128    Order Status: Completed Specimen: Biopsy from Back Updated: 02/22/22 0656     Anaerobic Culture No anaerobes isolated    Narrative:      L3-4 / L4-5 osteodiscitis    Gram stain [[id number]] Collected: 02/21/22 1539    Order Status: Completed Specimen: Abscess from Back Updated: 02/21/22 1753     Gram Stain Result No organisms seen      Few WBC's    Narrative:      Epidural purulence #2    Gram stain [[id number]] Collected: 02/21/22 1539    Order Status: Completed Specimen: Abscess from Back Updated: 02/21/22 1751     Gram Stain Result No organisms seen      Few WBC's    Narrative:      Epidural purulence #1    Fungus culture [[id number]] Collected: 02/21/22 1539    Order Status: Sent Specimen: Abscess from Back Updated: 02/21/22 1657    Fungus culture [[id number]] Collected: 02/21/22 1539    Order Status: Sent Specimen: Abscess from Back Updated: 02/21/22 1656          All pertinent labs from the last 24 hours have been reviewed.    Significant Diagnostics:  I have reviewed and interpreted all pertinent imaging results/findings within the past 24 hours.

## 2022-02-28 NOTE — PLAN OF CARE
[facility] - Neurosurgery ([facility])  Discharge Reassessment    Primary Care Provider: Dannielle Merino DO    Expected Discharge Date: 3/4/2022     Patient is not medically ready for discharge. Patient to return to OR on Wednesday 3/2/2022.  Referrals out to Rehab once medically stable.    Reassessment (most recent)     Discharge Reassessment - 02/28/22 1401        Discharge Reassessment    Assessment Type Discharge Planning Reassessment     Did the patient's condition or plan change since previous assessment? No     Discharge Plan discussed with: Patient     Communicated SHIRA with patient/caregiver Date not available/Unable to determine     Discharge Plan A Rehab     Discharge Plan B Home with family     DME Needed Upon Discharge  none     Discharge Barriers Identified None        Post-Acute Status    Post-Acute Authorization Placement     Post-Acute Placement Status Pending medical clearance/testing     Discharge Delays None known at this time

## 2022-02-28 NOTE — PLAN OF CARE
"40 y/o F with PMH of cirrhosis, Hep C, pancytopenia, polysubstance abuse, and epidural abscess s/p L3-L4 laminectomy and L3-L5 TLIF (4/2021; blood and surgical cultures positive for group B strep) admitted from [facility] with progressive lumbar spondylodiscitis and hardware failure. IM6 consulted for preop risk stratification and medical optimization.      Pt looks well, A&Ox4. Reports some left leg cramp this morning, otherwise feels well. Chart reviewed. S/p washout and hardware removal 2/21. Plan for reinstrumentation on 3/2.     MELD-Na score: 9 at 2/28/2022  6:14 AM  MELD score: 9 at 2/28/2022  6:14 AM  Calculated from:  Serum Creatinine: 0.5 mg/dL (Using min of 1 mg/dL) at 2/28/2022  6:14 AM  Serum Sodium: 140 mmol/L (Using max of 137 mmol/L) at 2/28/2022  6:14 AM  Total Bilirubin: 0.8 mg/dL (Using min of 1 mg/dL) at 2/28/2022  6:14 AM  INR(ratio): 1.3 at 2/28/2022  6:14 AM  Age: 41 years     - RCRI 0   - blood and biopsy cultures negative so far  - surgical cultures negative so far   - continue rocephin per ID recs, PICC placed   - History of leukopenia, today WBC are 1.93 with ANC of 1300. Continue to trend CBC.  - daily CMP, INR   - will need outpatient hepatology referral    - recommend placement on discharge for continued IV abx given hx of drug use and noncompliance with previous IV abx (per ID will likely need 8 weeks of antibiotics from day of surgery)     Please secure [facility]  ("Medicine Consult Only") or contact me directly for any additional questions or concerns.    Simi Argueta MD  Internal Medicine, PGYIII  [facility]  "

## 2022-02-28 NOTE — ASSESSMENT & PLAN NOTE
Rachel Zazueta is a 41 F with PMH hepatitis C, cirrhosis, pancytopenia, nicotine abuse, hx IVDU (last use 2 years ago), strep agalactae bacteremia, s/p L3-5 TLIF on 4/16/2021 who presents with persistent and progressive lumbar spondylodiscitis with resultant hardware failure and new scoliosis.     S/p hardware removal on 2/21.    --Patient admitted to NPU on telemetry.      -q4h neurochecks on floor.  --Xray with TLSO brace in place completed and reviewed.  --XR lumbar spine flex/ext completed and reviewed.  --All labs and diagnostics personally reviewed  --Follow-up MRI Brain, C/T/L w/wo contrast:   -No evidence of osteomyelitis, discitis, or epidural abscess within the cervical or thoracic spine   -No diffusion positive or abnormal enhancement within the brain   -MRI brain shows patchy nonspecific increased T2 and FLAIR signal in the periventricular and subcortical white matter bilaterally. Could be due to chronic microvascular ischemic changes vs demyelinating disease given patient's age. No evidence of active demyelination.   -DDD C4-7 with mild to moderate canal stenosis  --Full infectious w/u.   -Inflammatory markers wnl. BCx 2/14 NGTD, UA/Ucx pending. Wound Cx pending.   -F/u ID recs - now on Rocephin. PICC placed.   --S/p L3-4 disc biopsy and L3 bone biopsy with IR 2/15 - Gram stain rare WBC. Cx NGTD.  -- consult for risk stratification and medical optimization. Appreciate assistance. RCRI 0, 3.9% risk of perioperative cardiac complication.   --Utox + for amphetamines, addiction psych consulted per .   --Hold anti-plt/coag medications.  --Monitor for urinary retention - voiding spontaneously. Bladder scan prn.  --Pain control: Oxy 10 q4h PRN for moderate pain, Oxy 15 q4h PRN for severe pain, Dilaudid 1 mg q4h PRN when pain not relieved by PO meds. Continue lyrica 50 mg TID.  --Drains: Keep, continue IV abx while in place. High output, will switch to gravity.  --H&H 7.6 & 24.2 today s/p 1u prbc 2/25.  Continue to monitor.   --Platelets 78, may need transfusion prior to OR.   --Trending coags. INR 1.3m protime 13.1.  --DVT ppx: TEDs/SCDs/SQH  --Bowel regimen: Miralax, senna, lactulose 2/23. + BM.  --Activity: PT/OT OOB, TLSO brace when OOB.  --Continue to monitor clinically, notify NSGY immediately with any changes in neuro status.    Discussed with Dr. Templeton.    Dispo: Likely OR on 3/2/22 for reinstrumentation.

## 2022-02-28 NOTE — PT/OT/SLP PROGRESS
Physical Therapy      Patient Name:  Rachel Zazueta   MRN:  [id number]  Admitting Diagnosis:  Spondylodiscitis   Recent Surgery: Procedure(s) (LRB):  REMOVAL, HARDWARE, SPINE (N/A) 7 Days Post-Op  Admit Date: 2/14/2022  Length of Stay: 14 days    Patient not seen today by PT, but did work with OT. Rachel Zazueta's plan of care and PT goals reviewed on this date and remain appropriate. Will follow-up for progressive mobility as appropriate.    2/28/2022

## 2022-02-28 NOTE — PT/OT/SLP PROGRESS
Occupational Therapy   Treatment    Patient Name:  Rachel Zazueta   MRN:  [id number]  Admit Date: 2/14/2022  Admitting Diagnosis:  Spondylodiscitis   Length of Stay: 14 days  Recent Surgery: Procedure(s) (LRB):  REMOVAL, HARDWARE, SPINE (N/A) 7 Days Post-Op    Procedure(s):  REMOVAL, HARDWARE, SPINE     Recommendations:     Discharge Recommendations: rehabilitation facility  Discharge Equipment Recommendations:   (TBD)  Barriers to discharge:  Inaccessible home environment (increased assistance needed)    Plan:     Patient to be seen 3 x/week to address the above listed problems via self-care/home management, therapeutic activities, therapeutic exercises, neuromuscular re-education  · Plan of Care Expires: 03/21/22  · Plan of Care Reviewed with: patient    Assessment:   Rachel Zazueta is a 41 y.o. female with a medical diagnosis of Spondylodiscitis.  She presents with the following performance deficits affecting function: weakness, impaired endurance, impaired self care skills, impaired functional mobilty, gait instability, impaired balance, decreased safety awareness, decreased coordination, pain.  Pt would benefit from skilled OT services in order to maximize independence with ADLs and facilitate safe discharge. Pt's clinical condition meets full inpatient rehab criteria. Inpatient rehab will provide to total interdisciplinary treatment approach needed. Pt is at high risk of unplanned readmission due to fall risk. The lower level of care cannot provide total interdisciplinary approach needed. Because of patient's significant decline from PLOF, patient would benefit from Inpatient Rehab to maximize return to PLOF. Pt is an excellent candidate for inpatient rehabilitation, as he has a qualifying diagnosis, displays good activity tolerance, has experienced decline from PLOF, has good family support, and is motivated to participate in therapy. .    Time spent performing bed mobility, self-care, and  "ambulation    Relevant hx and current limitations:   Spinal precautions -TLSO   Surgery planned for 3/2    Pt continues to require significant assist with functional mobility and safe completion of ADLs requiring Max-Min A. Pt with noted improvement with self-care and ambulation as compared to last session. Patient is making progress towards goals.    Rehab Prognosis: Good; patient would benefit from acute skilled OT services to address these deficits and reach maximum level of function.        Subjective   Communicated with: RAUL Ferguson prior to session.  Patient found HOB elevated with hemovac, telemetry upon OT entry to room.    Patient: I need to get changed before I can get up but am feeling good    Pain/Comfort:  · Pain Rating 1: 0/10    Objective:   Patient found with: hemovac, telemetry   General Precautions: Standard, fall   Orthopedic Precautions:spinal precautions   Braces: TLSO   Oxygen Device: Room Air  Vitals: BP (!) 117/57 (BP Location: Left arm, Patient Position: Lying)   Pulse 91   Temp 96.9 °F (36.1 °C) (Oral)   Resp 18   Ht 5' 7" (1.702 m)   Wt 133.8 kg (294 lb 15.6 oz)   LMP  (LMP Unknown)   SpO2 99%   Breastfeeding No   BMI 46.20 kg/m²     Outcome Measures:  AMPAC 6 Click ADL: 18    Occupational Performance:  Bed Mobility:       Rolling bilateral: stand by assistance   Scooting: towards EOB with stand by assistance   Supine to Sit: minimum assistance    Functional Mobility/Transfers:     Sit to Stand: contact guard assistance using rolling walker    Bed <> Chair: Step Transfer with contact guard assistance using rolling walker   Pt ambulated 24' CGA with RW to simulate household and/or community distances in order to maximize functional activity tolerance and dynamic standing balance required for engagement in occupations of choice.   o LOB: No  o SOB: No  o Dizziness: No    Activities of Daily Living:     Upper Body Dressing: minimum assistance to doff/don TLSO sitting " EOB   Lower Body Dressing: total assistance to don/doff socks sitting EOB   o Pt able to assist in rolling bilaterally to doff briefs (Min A)   Toileting: minimum assistance to perform perineal hygiene and clothing management from supine; pt able to roll bilaterally and perform anterior/posterior pericare while maintaining spinal precautions      AMPAC 6 Click ADL:  AMPAC Total Score: 18    Treatment & Education:   Therapist provided facilitation and instruction of proper body mechanics, energy conservation, and fall prevention strategies during tasks listed above.   Pt re-educated on importance of OOB activities with staff member assistance and sitting OOB majority of the day.    Updated communication board with level of assist required (CGA) & educated RN/patient that pt is appropriate for transfers and mobility with RN/PCT.       Patient left up in chair with all lines intact, call button in reach and RN notified    GOALS:   Multidisciplinary Problems     Occupational Therapy Goals        Problem: Occupational Therapy Goal    Goal Priority Disciplines Outcome Interventions   Occupational Therapy Goal     OT, PT/OT Ongoing, Progressing    Description: Goals to be met by: 3/8/2022     Patient will increase functional independence with ADLs by performing:    Feeding with [place].  UE Dressing with Stand-by Assistance.  LE Dressing with Minimal Assistance.  Grooming while standing at sink with Set-up Assistance.  Toileting from toilet with Stand-by Assistance for hygiene and clothing management.   Toilet transfer to toilet with Contact Guard Assistance.  Pt able to verbalize and adhere to all spinal precautions 100% of the time.  Pt can don/doff TLSO brace stand-by assist                       Time Tracking:     OT Date of Treatment: 02/28/22  OT Start Time: 1144  OT Stop Time: 1201  OT Total Time (min): 17 min    Additional staff present: N/A    Billable Minutes:  Therapeutic Activity 17      Zonia (Lino),  OTR/BRIONNA  [phone] (Pager #)  2/28/2022

## 2022-02-28 NOTE — PROGRESS NOTES
[facility] - Neurosurgery ([facility])  Neurosurgery  Progress Note    Subjective:     History of Present Illness: Rachel Zazueta is a 41 y.o.  female with PMHx of Hepatitis C, liver cirrhosis, pancytopenia, and polysubstance abuse, who presents to clinic for follow up with new imaging s/p L3 and L4 laminectomy for evacuation of epidural abscess and L3-L5 TLIF on 04/16/2021.     The pt c/o worsening back and left leg pain since October. She states that she is no longer using IV drugs. States only using marijuana. Describes the left leg pain as a shock down the leg when standing for 10 minutes. Denies taking any antibiotics. Endorses new weakness in the legs. Denies b/b dysfunction. Denies new neck pain. She ambulates with a walker at home. States that she smoke.    She presents to ED as direct admit from clinic.       Post-Op Info:  Procedure(s) (LRB):  REMOVAL, HARDWARE, SPINE (N/A)   7 Days Post-Op     Interval History: NAEON. Neuro exam is stable. Reports continued back pain that radiates down her LLE. Denies new numbness or weakness. Continues to void spontaneously and has had Bms. Tolerating PO. H&H 7.6 & 24.2. Plt 78. Protime 13.2, INR 1.3. May need platelet transfusion prior to OR 3/2. WV removed, HV drains in place. Up in chair today with brace in place.     Medications:  Continuous Infusions:   sodium chloride 0.9% 10 mL/hr at 02/27/22 0305     Scheduled Meds:   acetaminophen  1,000 mg Oral Q8H    cefTRIAXone (ROCEPHIN) IVPB  2 g Intravenous Q12H    heparin (porcine)  5,000 Units Subcutaneous Q8H    pantoprazole  40 mg Oral Daily    pregabalin  50 mg Oral TID    senna-docusate 8.6-50 mg  1 tablet Oral Daily    sertraline  50 mg Oral Daily    sodium chloride 0.9%  10 mL Intravenous Q6H     PRN Meds:sodium chloride, aluminum-magnesium hydroxide-simethicone, dextrose 10%, dextrose 10%, glucagon (human recombinant), glucose, glucose, HYDROmorphone, melatonin, oxyCODONE, oxyCODONE, polyethylene glycol,  Flushing PICC Protocol **AND** sodium chloride 0.9% **AND** sodium chloride 0.9%       Objective:     Weight: 133.8 kg (294 lb 15.6 oz)  Body mass index is 46.2 kg/m².  Vital Signs (Most Recent):  Temp: 96.9 °F (36.1 °C) (02/28/22 1111)  Pulse: 91 (02/28/22 1111)  Resp: 18 (02/28/22 1111)  BP: (!) 117/57 (02/28/22 1111)  SpO2: 99 % (02/28/22 1111)   Vital Signs (24h Range):  Temp:  [96.9 °F (36.1 °C)-98.8 °F (37.1 °C)] 96.9 °F (36.1 °C)  Pulse:  [85-99] 91  Resp:  [16-20] 18  SpO2:  [96 %-99 %] 99 %  BP: (107-119)/(54-63) 117/57                          Closed/Suction Drain 02/21/22 1644 Left;Medial Back Accordion 10 Fr. (Active)   Site Description Unable to view 02/27/22 2048   Dressing Type Transparent (Tegaderm) 02/27/22 2048   Dressing Status Intact 02/27/22 2048   Dressing Intervention Integrity maintained 02/27/22 2048   Drainage Dark red 02/26/22 1200   Status Other (Comment) 02/26/22 2000   Output (mL) 0 mL 02/28/22 0600            Closed/Suction Drain 02/21/22 1645 Right;Medial Back Accordion 10 Fr. (Active)   Site Description Unable to view 02/27/22 2048   Dressing Type Transparent (Tegaderm) 02/27/22 2048   Dressing Status Intact 02/27/22 2048   Dressing Intervention Integrity maintained 02/27/22 2048   Drainage Dark red 02/26/22 1200   Status Other (Comment) 02/26/22 2000   Output (mL) 100 mL 02/28/22 0600       Female External Urinary Catheter 02/22/22 2300 (Active)   Skin no redness;no breakdown 02/27/22 2048   Tolerance no signs/symptoms of discomfort 02/27/22 2048   Suction Continuous suction at 40 mmHg 02/27/22 2048   Date of last wick change 02/26/22 02/26/22 2000   Time of last wick change 2000 02/26/22 2000   Output (mL) 500 mL 02/27/22 0701     Neurosurgery Physical Exam  General: Well developed, well nourished, not in acute distress.   Head: Normocephalic, atraumatic.  Neck: Full ROM.   Neurologic: Alert and oriented x 4. Thought content appropriate.  GCS: Motor:6  Verbal:5  Eyes:4   GCS  Total: 15  Language: No aphasia.  Speech: No dysarthria.  Cranial nerves: Face symmetric, tongue midline, CN II-XII grossly intact.   Eyes: Pupils equal, round, reactive to light with accomodation, EOMI.   Pulmonary: Normal respirations, no signs of respiratory distress.  Abdomen: Soft, non-distended, non-tender to palpation.  Sensory: Intact to light touch throughout.  Motor Strength: Moves all extremities spontaneously with good tone. No abnormal movements seen.      Strength   Deltoids Triceps Biceps Wrist Extension Wrist Flexion Hand    Upper: R 5/5 5/5 5/5 5/5 5/5 5/5     L 5/5 5/5 5/5 5/5 5/5 5/5       Hip Flexion Knee Extension Knee  Flexion Dorsiflexion Plantar flexion EHL   Lower: R 5/5 5/5 5/5 5/5 5/5 5/5     L 4/5 4/5 4/5 5/5 5/5 5/5      Parker: Absent.  Clonus: 3 beats bilaterally.  Skin: Skin is warm, dry and intact.     Incision c/d/i with staples. No surrounding erythema or edema. No drainage from incision. No palpable hematoma or underlying fluid collection.     HV drains in place.       Significant Labs:  Recent Labs   Lab 02/27/22 0259 02/28/22  0614   * 88    140   K 3.7 3.9    107   CO2 28 31*   BUN 18 18   CREATININE 0.6 0.5   CALCIUM 7.6* 7.8*     Recent Labs   Lab 02/27/22  0259 02/28/22  0614   WBC 1.93* 1.63*   HGB 8.1* 7.6*   HCT 25.9* 24.2*   PLT 71* 78*     Recent Labs   Lab 02/27/22  0259 02/28/22  0614   INR 1.3* 1.3*     Microbiology Results (last 7 days)       Procedure Component Value Units Date/Time    Aerobic culture [[id number]] Collected: 02/21/22 1539    Order Status: Completed Specimen: Abscess from Back Updated: 02/25/22 0957     Aerobic Bacterial Culture No growth    Narrative:      Epidural purulence #1    Culture, Anaerobe [[id number]] Collected: 02/21/22 1539    Order Status: Completed Specimen: Abscess from Back Updated: 02/25/22 0728     Anaerobic Culture No anaerobes isolated    Narrative:      Epidural purulence #2    Culture, Anaerobe  [[id number]] Collected: 02/21/22 1539    Order Status: Completed Specimen: Abscess from Back Updated: 02/25/22 0728     Anaerobic Culture No anaerobes isolated    Narrative:      Epidural purulence #1    Aerobic culture [[id number]] Collected: 02/21/22 1539    Order Status: Completed Specimen: Abscess from Back Updated: 02/24/22 1124     Aerobic Bacterial Culture No growth    Narrative:      Epidural purulence #2    AFB Culture & Smear [[id number]] Collected: 02/21/22 1539    Order Status: Completed Specimen: Abscess from Back Updated: 02/22/22 2127     AFB Culture & Smear Culture in progress     AFB CULTURE STAIN No acid fast bacilli seen.    Narrative:      Epidural purulence #1    AFB Culture & Smear [[id number]] Collected: 02/21/22 1539    Order Status: Completed Specimen: Abscess from Back Updated: 02/22/22 2127     AFB Culture & Smear Culture in progress     AFB CULTURE STAIN No acid fast bacilli seen.    Narrative:      Epidural purulence #2    Culture, Anaerobic [[id number]] Collected: 02/15/22 1128    Order Status: Completed Specimen: Biopsy from Back Updated: 02/22/22 0656     Anaerobic Culture No anaerobes isolated    Narrative:      L3-4 / L4-5 osteodiscitis    Gram stain [[id number]] Collected: 02/21/22 1539    Order Status: Completed Specimen: Abscess from Back Updated: 02/21/22 1753     Gram Stain Result No organisms seen      Few WBC's    Narrative:      Epidural purulence #2    Gram stain [[id number]] Collected: 02/21/22 1539    Order Status: Completed Specimen: Abscess from Back Updated: 02/21/22 1751     Gram Stain Result No organisms seen      Few WBC's    Narrative:      Epidural purulence #1    Fungus culture [[id number]] Collected: 02/21/22 1539    Order Status: Sent Specimen: Abscess from Back Updated: 02/21/22 1657    Fungus culture [[id number]] Collected: 02/21/22 1539    Order Status: Sent Specimen: Abscess from Back Updated: 02/21/22 1656          All pertinent labs from the last 24 hours have  been reviewed.    Significant Diagnostics:  I have reviewed and interpreted all pertinent imaging results/findings within the past 24 hours.    Assessment/Plan:     * Spondylodiscitis  Rachel Zazueta is a 41 F with PMH hepatitis C, cirrhosis, pancytopenia, nicotine abuse, hx IVDU (last use 2 years ago), strep agalactae bacteremia, s/p L3-5 TLIF on 4/16/2021 who presents with persistent and progressive lumbar spondylodiscitis with resultant hardware failure and new scoliosis.     S/p hardware removal on 2/21.    --Patient admitted to NPU on telemetry.      -q4h neurochecks on floor.  --Xray with TLSO brace in place completed and reviewed.  --XR lumbar spine flex/ext completed and reviewed.  --All labs and diagnostics personally reviewed  --Follow-up MRI Brain, C/T/L w/wo contrast:   -No evidence of osteomyelitis, discitis, or epidural abscess within the cervical or thoracic spine   -No diffusion positive or abnormal enhancement within the brain   -MRI brain shows patchy nonspecific increased T2 and FLAIR signal in the periventricular and subcortical white matter bilaterally. Could be due to chronic microvascular ischemic changes vs demyelinating disease given patient's age. No evidence of active demyelination.   -DDD C4-7 with mild to moderate canal stenosis  --Full infectious w/u.   -Inflammatory markers wnl. BCx 2/14 NGTD, UA/Ucx pending. Wound Cx pending.   -F/u ID recs - now on Rocephin. PICC placed.   --S/p L3-4 disc biopsy and L3 bone biopsy with IR 2/15 - Gram stain rare WBC. Cx NGTD.  -- consult for risk stratification and medical optimization. Appreciate assistance. RCRI 0, 3.9% risk of perioperative cardiac complication.   --Utox + for amphetamines, addiction psych consulted per .   --Hold anti-plt/coag medications.  --Monitor for urinary retention - voiding spontaneously. Bladder scan prn.  --Pain control: Oxy 10 q4h PRN for moderate pain, Oxy 15 q4h PRN for severe pain, Dilaudid 1 mg q4h PRN when  pain not relieved by PO meds. Continue lyrica 50 mg TID.  --Drains: Keep, continue IV abx while in place. High output, will switch to gravity.  --H&H 7.6 & 24.2 today s/p 1u prbc 2/25. Continue to monitor.   --Platelets 78, may need transfusion prior to OR.   --Trending coags. INR 1.3m protime 13.1.  --DVT ppx: TEDs/SCDs/SQH  --Bowel regimen: Miralax, senna, lactulose 2/23. + BM.  --Activity: PT/OT OOB, TLSO brace when OOB.  --Continue to monitor clinically, notify NSGY immediately with any changes in neuro status.    Discussed with Dr. Templeton.    Dispo: Likely OR on 3/2/22 for reinstrumentation.        Afua Campbell PA-C  Neurosurgery  [facility] - Neurosurgery ([facility])

## 2022-02-28 NOTE — PLAN OF CARE
Problem: Adult Inpatient Plan of Care  Goal: Absence of Hospital-Acquired Illness or Injury  Outcome: Ongoing, Progressing     Problem: Adult Inpatient Plan of Care  Goal: Readiness for Transition of Care  Outcome: Ongoing, Progressing   Patient aaox4, amb with walker, Back incision, Wound vac, Hemovac drain , PRN pain med around the clock, waiting for place,ment ,will cont to monitor.

## 2022-03-01 LAB
ALBUMIN SERPL BCP-MCNC: 1.8 G/DL (ref 3.5–5.2)
ALP SERPL-CCNC: 88 U/L (ref 55–135)
ALT SERPL W/O P-5'-P-CCNC: 22 U/L (ref 10–44)
ANION GAP SERPL CALC-SCNC: 4 MMOL/L (ref 8–16)
ANISOCYTOSIS BLD QL SMEAR: SLIGHT
AST SERPL-CCNC: 49 U/L (ref 10–40)
BASOPHILS # BLD AUTO: 0.02 K/UL (ref 0–0.2)
BASOPHILS NFR BLD: 1.3 % (ref 0–1.9)
BILIRUB SERPL-MCNC: 0.7 MG/DL (ref 0.1–1)
BUN SERPL-MCNC: 16 MG/DL (ref 6–20)
CALCIUM SERPL-MCNC: 7.6 MG/DL (ref 8.7–10.5)
CHLORIDE SERPL-SCNC: 105 MMOL/L (ref 95–110)
CO2 SERPL-SCNC: 29 MMOL/L (ref 23–29)
CREAT SERPL-MCNC: 0.5 MG/DL (ref 0.5–1.4)
DIFFERENTIAL METHOD: ABNORMAL
EOSINOPHIL # BLD AUTO: 0.1 K/UL (ref 0–0.5)
EOSINOPHIL NFR BLD: 8.7 % (ref 0–8)
ERYTHROCYTE [DISTWIDTH] IN BLOOD BY AUTOMATED COUNT: 17.8 % (ref 11.5–14.5)
EST. GFR  (AFRICAN AMERICAN): >60 ML/MIN/1.73 M^2
EST. GFR  (NON AFRICAN AMERICAN): >60 ML/MIN/1.73 M^2
GLUCOSE SERPL-MCNC: 94 MG/DL (ref 70–110)
HCT VFR BLD AUTO: 24.1 % (ref 37–48.5)
HGB BLD-MCNC: 7.4 G/DL (ref 12–16)
HYPOCHROMIA BLD QL SMEAR: ABNORMAL
IMM GRANULOCYTES # BLD AUTO: 0 K/UL (ref 0–0.04)
IMM GRANULOCYTES NFR BLD AUTO: 0 % (ref 0–0.5)
INR PPP: 1.3 (ref 0.8–1.2)
LYMPHOCYTES # BLD AUTO: 0.5 K/UL (ref 1–4.8)
LYMPHOCYTES NFR BLD: 30.7 % (ref 18–48)
MCH RBC QN AUTO: 29 PG (ref 27–31)
MCHC RBC AUTO-ENTMCNC: 30.7 G/DL (ref 32–36)
MCV RBC AUTO: 95 FL (ref 82–98)
MONOCYTES # BLD AUTO: 0.1 K/UL (ref 0.3–1)
MONOCYTES NFR BLD: 9.3 % (ref 4–15)
NEUTROPHILS # BLD AUTO: 0.8 K/UL (ref 1.8–7.7)
NEUTROPHILS NFR BLD: 50 % (ref 38–73)
NRBC BLD-RTO: 0 /100 WBC
OVALOCYTES BLD QL SMEAR: ABNORMAL
PLATELET # BLD AUTO: 67 K/UL (ref 150–450)
PMV BLD AUTO: 9.5 FL (ref 9.2–12.9)
POIKILOCYTOSIS BLD QL SMEAR: SLIGHT
POLYCHROMASIA BLD QL SMEAR: ABNORMAL
POTASSIUM SERPL-SCNC: 3.8 MMOL/L (ref 3.5–5.1)
PROT SERPL-MCNC: 5.2 G/DL (ref 6–8.4)
PROTHROMBIN TIME: 13 SEC (ref 9–12.5)
RBC # BLD AUTO: 2.55 M/UL (ref 4–5.4)
SODIUM SERPL-SCNC: 138 MMOL/L (ref 136–145)
WBC # BLD AUTO: 1.5 K/UL (ref 3.9–12.7)

## 2022-03-01 PROCEDURE — 85025 COMPLETE CBC W/AUTO DIFF WBC: CPT | Performed by: STUDENT IN AN ORGANIZED HEALTH CARE EDUCATION/TRAINING PROGRAM

## 2022-03-01 PROCEDURE — 10061 I&D ABSCESS COMP/MULTIPLE: CPT

## 2022-03-01 PROCEDURE — 99024 POSTOP FOLLOW-UP VISIT: CPT | Mod: ,,,

## 2022-03-01 PROCEDURE — 63600175 PHARM REV CODE 636 W HCPCS: Performed by: PHYSICIAN ASSISTANT

## 2022-03-01 PROCEDURE — 25000003 PHARM REV CODE 250: Performed by: PHYSICIAN ASSISTANT

## 2022-03-01 PROCEDURE — 85610 PROTHROMBIN TIME: CPT

## 2022-03-01 PROCEDURE — C1729 CATH, DRAINAGE: HCPCS

## 2022-03-01 PROCEDURE — 11000001 HC ACUTE MED/SURG PRIVATE ROOM

## 2022-03-01 PROCEDURE — 99024 PR POST-OP FOLLOW-UP VISIT: ICD-10-PCS | Mod: ,,,

## 2022-03-01 PROCEDURE — 25000003 PHARM REV CODE 250: Performed by: STUDENT IN AN ORGANIZED HEALTH CARE EDUCATION/TRAINING PROGRAM

## 2022-03-01 PROCEDURE — 25000003 PHARM REV CODE 250: Performed by: NEUROLOGICAL SURGERY

## 2022-03-01 PROCEDURE — 80053 COMPREHEN METABOLIC PANEL: CPT

## 2022-03-01 PROCEDURE — 25000003 PHARM REV CODE 250

## 2022-03-01 PROCEDURE — C1751 CATH, INF, PER/CENT/MIDLINE: HCPCS

## 2022-03-01 PROCEDURE — A4216 STERILE WATER/SALINE, 10 ML: HCPCS | Performed by: NEUROLOGICAL SURGERY

## 2022-03-01 PROCEDURE — 63600175 PHARM REV CODE 636 W HCPCS: Performed by: STUDENT IN AN ORGANIZED HEALTH CARE EDUCATION/TRAINING PROGRAM

## 2022-03-01 RX ORDER — HYDROCODONE BITARTRATE AND ACETAMINOPHEN 500; 5 MG/1; MG/1
TABLET ORAL
Status: DISCONTINUED | OUTPATIENT
Start: 2022-03-01 | End: 2022-03-02

## 2022-03-01 RX ADMIN — OXYCODONE HYDROCHLORIDE 15 MG: 10 TABLET ORAL at 04:03

## 2022-03-01 RX ADMIN — CEFTRIAXONE 2 G: 2 INJECTION, POWDER, FOR SOLUTION INTRAMUSCULAR; INTRAVENOUS at 01:03

## 2022-03-01 RX ADMIN — HEPARIN SODIUM 5000 UNITS: 5000 INJECTION INTRAVENOUS; SUBCUTANEOUS at 05:03

## 2022-03-01 RX ADMIN — PANTOPRAZOLE SODIUM 40 MG: 20 TABLET, DELAYED RELEASE ORAL at 08:03

## 2022-03-01 RX ADMIN — Medication 10 ML: at 05:03

## 2022-03-01 RX ADMIN — Medication 10 ML: at 08:03

## 2022-03-01 RX ADMIN — CEFTRIAXONE 2 G: 2 INJECTION, POWDER, FOR SOLUTION INTRAMUSCULAR; INTRAVENOUS at 05:03

## 2022-03-01 RX ADMIN — ACETAMINOPHEN 1000 MG: 500 TABLET ORAL at 10:03

## 2022-03-01 RX ADMIN — PREGABALIN 50 MG: 50 CAPSULE ORAL at 08:03

## 2022-03-01 RX ADMIN — ACETAMINOPHEN 1000 MG: 500 TABLET ORAL at 05:03

## 2022-03-01 RX ADMIN — Medication 10 ML: at 12:03

## 2022-03-01 RX ADMIN — PREGABALIN 50 MG: 50 CAPSULE ORAL at 04:03

## 2022-03-01 RX ADMIN — HYDROMORPHONE HYDROCHLORIDE 1 MG: 1 INJECTION, SOLUTION INTRAMUSCULAR; INTRAVENOUS; SUBCUTANEOUS at 10:03

## 2022-03-01 RX ADMIN — SERTRALINE HYDROCHLORIDE 50 MG: 50 TABLET ORAL at 08:03

## 2022-03-01 RX ADMIN — SENNOSIDES AND DOCUSATE SODIUM 1 TABLET: 50; 8.6 TABLET ORAL at 08:03

## 2022-03-01 RX ADMIN — OXYCODONE HYDROCHLORIDE 15 MG: 10 TABLET ORAL at 01:03

## 2022-03-01 RX ADMIN — HYDROMORPHONE HYDROCHLORIDE 1 MG: 1 INJECTION, SOLUTION INTRAMUSCULAR; INTRAVENOUS; SUBCUTANEOUS at 08:03

## 2022-03-01 RX ADMIN — OXYCODONE HYDROCHLORIDE 15 MG: 10 TABLET ORAL at 08:03

## 2022-03-01 RX ADMIN — ACETAMINOPHEN 1000 MG: 500 TABLET ORAL at 04:03

## 2022-03-01 NOTE — OP NOTE
Stage 1 of 2    DATE OF SURGERY: 2/21/22    PREOPERATIVE DIAGNOSIS:  1. Chronic surgical site infection with hardware failure  2. Class III obesity, IV drug abuse    POSTOPERATIVE DIAGNOSIS:  Same.    PROCEDURE PERFORMED:  1. Lumbar wound exploration, wound washout and debridement   2. Explantation of posterior spinal hardware, L3-L5    SURGEON: Guille Templeton M.D.    ASSISTANT: Jaylon Gomes M.D.    ANESTHESIA: GETA    ESTIMATED BLOOD LOSS: 1400mL    COMPLICATIONS: None    DRAINS: Two deep and Prevena    SPECIMENS SENT: Explanted hardware and cultures    FINDINGS: Loose spinal hardware and paraspinal pus    INDICATIONS:    41F with polysubstance abuse and osteomyelitis s/p lumbar fusion lost to follow up presented to clinic with back and radicular leg pain. Imaging showed loose lumbar hardware, new scoliosis and suspected ongoing infection. I direct admitted her to the hospital and recommended a two stage surgery. Stage 1, here, is as described above.      Risks, benefits, alternatives, indications and methods were reviewed in detail and the patient wished to proceed.     PROCEDURE:    The patient was brought into the operating room where she was intubated and placed under general anesthesia without difficulty.  All lines were placed.  She was placed prone onto a Glen table with appropriate padding of all pressure points. The previous lumbar scar was noted and lateral x-ray was used to localize from approximately T10 to S2.  The region was prepped marked and draped in usual sterile fashion.  A timeout was performed prior to the procedure.  10 mL's of lidocaine with epinephrine were injected into the skin.    A linear incision was made with a 10 blade from approximately T10 to S2. Supra and subfascial dissection was carried out with Bovie electrocautery at the midline. Dense scar tissue was encountered in this plane and in the subfascial space.  Subperiosteal dissection was carried out with Bovie electrocautery  and Almazan elevators to expose the posterior elements from approximately T10 to pelvis, and the previous spinal hardware from L3 to L5. Taz pus was seen over the spinal hardware and in the paraspinal region. It was cultured. The spinal hardware was found to be grossly loose at all levels and was explanted.  The supra and subfascial soft tissues and the posterior elements of the bony spine were excisionally debrided with curettes and ilana.    The wound was then copiously irrigated with a dilute Betadine/hydrogen peroxide solution and Pulsavac lavage.  Antibiotic beads were placed.  Two deep Hemovacs were then placed. The wound was then closed in layers and the skin edges were covered with a Prevena. The patient was then repositioned supine onto the hospital bed, where she was extubated. She was sent to the PACU in stable condition for recovery. I was present for all critical portions of the case and at the end of the case all counts were correct.

## 2022-03-01 NOTE — PLAN OF CARE
Problem: Adult Inpatient Plan of Care  Goal: Plan of Care Review  Outcome: Ongoing, Progressing     Problem: Fall Injury Risk  Goal: Absence of Fall and Fall-Related Injury  Outcome: Ongoing, Progressing     Problem: Skin Injury Risk Increased  Goal: Skin Health and Integrity  Outcome: Ongoing, Progressing     Problem: Infection  Goal: Absence of Infection Signs and Symptoms  Outcome: Ongoing, Progressing     POC reviewed with patient. VSS, see flow sheet. Fall/Safety precautions maintained. No sings of injury noted at this shift. Pain management every four hours. No signs of acute distress noted at this time. Plan for re instrumental on 3/2.

## 2022-03-01 NOTE — PROGRESS NOTES
[facility] - Neurosurgery ([facility])  Neurosurgery  Progress Note    Subjective:     History of Present Illness: Rachel Zazueta is a 41 y.o.  female with PMHx of Hepatitis C, liver cirrhosis, pancytopenia, and polysubstance abuse, who presents to clinic for follow up with new imaging s/p L3 and L4 laminectomy for evacuation of epidural abscess and L3-L5 TLIF on 04/16/2021.     The pt c/o worsening back and left leg pain since October. She states that she is no longer using IV drugs. States only using marijuana. Describes the left leg pain as a shock down the leg when standing for 10 minutes. Denies taking any antibiotics. Endorses new weakness in the legs. Denies b/b dysfunction. Denies new neck pain. She ambulates with a walker at home. States that she smoke.    She presents to ED as direct admit from clinic.       Post-Op Info:  Procedure(s) (LRB):  REMOVAL, HARDWARE, SPINE (N/A)   8 Days Post-Op     Interval History: NAEON. Neuro stable. H&H 7.4 & 24.1 today. Transfusing pRBCs and platelets prior to surgery tomorrow am. HV drains to remain in place. Discussed surgical plan, risks, benefits and alternatives with patient. Patient voiced understanding.     Medications:  Continuous Infusions:   sodium chloride 0.9% 10 mL/hr at 02/27/22 0305     Scheduled Meds:   acetaminophen  1,000 mg Oral Q8H    cefTRIAXone (ROCEPHIN) IVPB  2 g Intravenous Q12H    heparin (porcine)  5,000 Units Subcutaneous Q8H    pantoprazole  40 mg Oral Daily    pregabalin  50 mg Oral TID    senna-docusate 8.6-50 mg  1 tablet Oral Daily    sertraline  50 mg Oral Daily    sodium chloride 0.9%  10 mL Intravenous Q6H     PRN Meds:sodium chloride, sodium chloride, sodium chloride, aluminum-magnesium hydroxide-simethicone, dextrose 10%, dextrose 10%, glucagon (human recombinant), glucose, glucose, HYDROmorphone, melatonin, oxyCODONE, oxyCODONE, polyethylene glycol, Flushing PICC Protocol **AND** sodium chloride 0.9% **AND** sodium chloride  0.9%     Objective:     Weight: 133.8 kg (294 lb 15.6 oz)  Body mass index is 46.2 kg/m².  Vital Signs (Most Recent):  Temp: 98.6 °F (37 °C) (03/01/22 1227)  Pulse: 86 (03/01/22 1227)  Resp: 16 (03/01/22 1227)  BP: 111/61 (03/01/22 1227)  SpO2: 96 % (03/01/22 1227) Vital Signs (24h Range):  Temp:  [97.6 °F (36.4 °C)-98.6 °F (37 °C)] 98.6 °F (37 °C)  Pulse:  [86-96] 86  Resp:  [16-20] 16  SpO2:  [96 %-100 %] 96 %  BP: (101-122)/(56-63) 111/61                          Closed/Suction Drain 02/21/22 1644 Left;Medial Back Accordion 10 Fr. (Active)   Site Description Healing 02/28/22 2053   Dressing Type Other (Comment) 02/28/22 2053   Dressing Status Intact 02/27/22 2048   Dressing Intervention Integrity maintained 02/28/22 1800   Drainage Serosanguineous 02/28/22 2053   Status To bulb suction 02/28/22 2053   Output (mL) 100 mL 03/01/22 0500            Closed/Suction Drain 02/21/22 1645 Right;Medial Back Accordion 10 Fr. (Active)   Site Description Healing 02/28/22 2053   Dressing Type Other (Comment) 02/28/22 2053   Dressing Status Intact 02/28/22 1800   Dressing Intervention Integrity maintained 02/28/22 1800   Drainage Serosanguineous 02/28/22 2053   Status To bulb suction 02/28/22 2053   Output (mL) 20 mL 03/01/22 0500       Female External Urinary Catheter 02/22/22 2300 (Active)   Skin no redness;no breakdown 02/28/22 2053   Tolerance no signs/symptoms of discomfort 02/28/22 2053   Suction Continuous suction at 40 mmHg 02/28/22 2053   Date of last wick change 02/26/22 02/28/22 2053   Time of last wick change 2000 02/26/22 2000   Output (mL) 500 mL 02/27/22 0701     Neurosurgery Physical Exam  General: Well developed, well nourished, not in acute distress.   Head: Normocephalic, atraumatic.  Neck: Full ROM.   Neurologic: Alert and oriented x 4. Thought content appropriate.  GCS: Motor:6  Verbal:5  Eyes:4   GCS Total: 15  Language: No aphasia.  Speech: No dysarthria.  Cranial nerves: Face symmetric, tongue midline,  CN II-XII grossly intact.   Eyes: Pupils equal, round, reactive to light with accomodation, EOMI.   Pulmonary: Normal respirations, no signs of respiratory distress.  Abdomen: Soft, non-distended, non-tender to palpation.  Sensory: Intact to light touch throughout.  Motor Strength: Moves all extremities spontaneously with good tone. No abnormal movements seen.      Strength   Deltoids Triceps Biceps Wrist Extension Wrist Flexion Hand    Upper: R 5/5 5/5 5/5 5/5 5/5 5/5     L 5/5 5/5 5/5 5/5 5/5 5/5       Hip Flexion Knee Extension Knee  Flexion Dorsiflexion Plantar flexion EHL   Lower: R 5/5 5/5 5/5 5/5 5/5 5/5     L 4/5 4/5 4/5 5/5 5/5 5/5      Parker: Absent.  Clonus: 3 beats bilaterally.  Skin: Skin is warm, dry and intact.     Incision c/d/i with staples. No surrounding erythema or edema. No drainage from incision. No palpable hematoma or underlying fluid collection.     HV drains in place.    Significant Labs:  Recent Labs   Lab 02/28/22  0614 03/01/22  0532   GLU 88 94    138   K 3.9 3.8    105   CO2 31* 29   BUN 18 16   CREATININE 0.5 0.5   CALCIUM 7.8* 7.6*     Recent Labs   Lab 02/28/22  0614 03/01/22  0532   WBC 1.63* 1.50*   HGB 7.6* 7.4*   HCT 24.2* 24.1*   PLT 78* 67*     Recent Labs   Lab 02/28/22  0614 03/01/22  0532   INR 1.3* 1.3*     Microbiology Results (last 7 days)       Procedure Component Value Units Date/Time    Aerobic culture [[id number]] Collected: 02/21/22 1539    Order Status: Completed Specimen: Abscess from Back Updated: 02/25/22 0957     Aerobic Bacterial Culture No growth    Narrative:      Epidural purulence #1    Culture, Anaerobe [[id number]] Collected: 02/21/22 1539    Order Status: Completed Specimen: Abscess from Back Updated: 02/25/22 0728     Anaerobic Culture No anaerobes isolated    Narrative:      Epidural purulence #2    Culture, Anaerobe [[id number]] Collected: 02/21/22 1539    Order Status: Completed Specimen: Abscess from Back Updated: 02/25/22 0749      Anaerobic Culture No anaerobes isolated    Narrative:      Epidural purulence #1    Aerobic culture [[id number]] Collected: 02/21/22 1539    Order Status: Completed Specimen: Abscess from Back Updated: 02/24/22 1124     Aerobic Bacterial Culture No growth    Narrative:      Epidural purulence #2    AFB Culture & Smear [[id number]] Collected: 02/21/22 1539    Order Status: Completed Specimen: Abscess from Back Updated: 02/22/22 2127     AFB Culture & Smear Culture in progress     AFB CULTURE STAIN No acid fast bacilli seen.    Narrative:      Epidural purulence #1    AFB Culture & Smear [[id number]] Collected: 02/21/22 1539    Order Status: Completed Specimen: Abscess from Back Updated: 02/22/22 2127     AFB Culture & Smear Culture in progress     AFB CULTURE STAIN No acid fast bacilli seen.    Narrative:      Epidural purulence #2          All pertinent labs from the last 24 hours have been reviewed.    Significant Diagnostics:  I have reviewed and interpreted all pertinent imaging results/findings within the past 24 hours.    Assessment/Plan:     * Spondylodiscitis  Rachel Zazueta is a 41 F with PMH hepatitis C, cirrhosis, pancytopenia, nicotine abuse, hx IVDU (last use 2 years ago), strep agalactae bacteremia, s/p L3-5 TLIF on 4/16/2021 who presents with persistent and progressive lumbar spondylodiscitis with resultant hardware failure and new scoliosis.     S/p hardware removal on 2/21.    --Patient admitted to NPU on telemetry.      -q4h neurochecks on floor.  --Xray with TLSO brace in place completed and reviewed.  --XR lumbar spine flex/ext completed and reviewed.  --All labs and diagnostics personally reviewed  --Follow-up MRI Brain, C/T/L w/wo contrast:   -No evidence of osteomyelitis, discitis, or epidural abscess within the cervical or thoracic spine   -No diffusion positive or abnormal enhancement within the brain   -MRI brain shows patchy nonspecific increased T2 and FLAIR signal in the periventricular  and subcortical white matter bilaterally. Could be due to chronic microvascular ischemic changes vs demyelinating disease given patient's age. No evidence of active demyelination.   -DDD C4-7 with mild to moderate canal stenosis  --Full infectious w/u.   -Inflammatory markers wnl. BCx 2/14 NGTD, UA/Ucx pending. Wound Cx pending.   -F/u ID recs - now on Rocephin. PICC placed.   --S/p L3-4 disc biopsy and L3 bone biopsy with IR 2/15 - Gram stain rare WBC. Cx NGTD.  -- consult for risk stratification and medical optimization. Appreciate assistance. RCRI 0, 3.9% risk of perioperative cardiac complication.   --Utox + for amphetamines, addiction psych consulted per .   --Hold anti-plt/coag medications.  --Monitor for urinary retention - voiding spontaneously. Bladder scan prn.  --Pain control: Oxy 10 q4h PRN for moderate pain, Oxy 15 q4h PRN for severe pain, Dilaudid 1 mg q4h PRN when pain not relieved by PO meds. Continue lyrica 50 mg TID.  --Drains: Keep, continue IV abx while in place. High output, will switch to gravity.  --OR tomorrow am for T10-pelvis reinstrumentation. Hold SQH. NPO at midnight.   --Patient is consented and marked. Discussed the surgical plan, risks, benefits and alternatives. Answered any questions the patient may have and she voiced understanding.   --H&H 7.4 & 24.1 today s/p 1u prbc 2/25. Platelets 67 today. Transfusing 1u pRBCs and 1u platelets prior to surgery tomorrow am. Sending blood products to OR on call.   --Trending coags. INR 1.3s protime 13.0.  --DVT ppx: TEDs/SCDs  --Bowel regimen: Miralax, senna, lactulose 2/23. + BM.  --Activity: PT/OT OOB, TLSO brace when OOB.  --Continue to monitor clinically, notify NSGY immediately with any changes in neuro status.    Discussed with Dr. Templeton.    Dispo: OR on 3/2/22 for reinstrumentation.        Afua Campbell PA-C  Neurosurgery  [facility] - Neurosurgery ([facility])

## 2022-03-01 NOTE — PLAN OF CARE
"40 y/o F with PMH of cirrhosis, Hep C, pancytopenia, polysubstance abuse, and epidural abscess s/p L3-L4 laminectomy and L3-L5 TLIF (4/2021; blood and surgical cultures positive for group B strep) [place] [facility] with progressive lumbar spondylodiscitis and hardware failure. IM6 consulted for preop risk stratification and medical optimization.      Pt looks well, A&Ox4. Reports some left leg cramp is better today. Chart reviewed. S/p washout and hardware removal 2/21. Plan for reinstrumentation on 3/2. She may need platelet and PRBCs transfusion before the surgery tomorrow.      MELD-Na score: 9 at 3/1/2022  5:32 AM  MELD score: 9 at 3/1/2022  5:32 AM  Calculated from:  Serum Creatinine: 0.5 mg/dL (Using min of 1 mg/dL) at 3/1/2022  5:32 AM  Serum Sodium: 138 mmol/L (Using max of 137 mmol/L) at 3/1/2022  5:32 AM  Total Bilirubin: 0.7 mg/dL (Using min of 1 mg/dL) at 3/1/2022  5:32 AM  INR(ratio): 1.3 at 3/1/2022  5:32 AM  Age: 41 years     - RCRI 0   - blood and biopsy cultures negative so far  - surgical cultures negative so far   - continue rocephin per ID recs, PICC placed   - History of leukopenia, today WBC are 1.93 with ANC of 1300. Continue to trend CBC.  - daily CMP, INR   - will need outpatient hepatology referral    - recommend placement on discharge for continued IV abx given hx of drug use and noncompliance with previous IV abx (per ID will likely need 8 weeks of antibiotics from day of surgery)     Please secure chat [facility]  ("Medicine Consult Only") or contact me directly for any additional questions or concerns.     Simi Argueta MD  Internal Medicine, PGYIII  [facility]  "

## 2022-03-01 NOTE — ASSESSMENT & PLAN NOTE
Rachel Zazueta is a 41 F with PMH hepatitis C, cirrhosis, pancytopenia, nicotine abuse, hx IVDU (last use 2 years ago), strep agalactae bacteremia, s/p L3-5 TLIF on 4/16/2021 who presents with persistent and progressive lumbar spondylodiscitis with resultant hardware failure and new scoliosis.     S/p hardware removal on 2/21.    --Patient admitted to NPU on telemetry.      -q4h neurochecks on floor.  --Xray with TLSO brace in place completed and reviewed.  --XR lumbar spine flex/ext completed and reviewed.  --All labs and diagnostics personally reviewed  --Follow-up MRI Brain, C/T/L w/wo contrast:   -No evidence of osteomyelitis, discitis, or epidural abscess within the cervical or thoracic spine   -No diffusion positive or abnormal enhancement within the brain   -MRI brain shows patchy nonspecific increased T2 and FLAIR signal in the periventricular and subcortical white matter bilaterally. Could be due to chronic microvascular ischemic changes vs demyelinating disease given patient's age. No evidence of active demyelination.   -DDD C4-7 with mild to moderate canal stenosis  --Full infectious w/u.   -Inflammatory markers wnl. BCx 2/14 NGTD, UA/Ucx pending. Wound Cx pending.   -F/u ID recs - now on Rocephin. PICC placed.   --S/p L3-4 disc biopsy and L3 bone biopsy with IR 2/15 - Gram stain rare WBC. Cx NGTD.  -- consult for risk stratification and medical optimization. Appreciate assistance. RCRI 0, 3.9% risk of perioperative cardiac complication.   --Utox + for amphetamines, addiction psych consulted per .   --Hold anti-plt/coag medications.  --Monitor for urinary retention - voiding spontaneously. Bladder scan prn.  --Pain control: Oxy 10 q4h PRN for moderate pain, Oxy 15 q4h PRN for severe pain, Dilaudid 1 mg q4h PRN when pain not relieved by PO meds. Continue lyrica 50 mg TID.  --Drains: Keep, continue IV abx while in place. High output, will switch to gravity.  --OR tomorrow am for T10-pelvis  reinstrumentation. Hold SQH. NPO at midnight.   --Patient is consented and marked. Discussed the surgical plan, risks, benefits and alternatives. Answered any questions the patient may have and she voiced understanding.   --H&H 7.4 & 24.1 today s/p 1u prbc 2/25. Platelets 67 today. Transfusing 1u pRBCs and 1u platelets prior to surgery tomorrow am. Sending blood products to OR on call.   --Trending coags. INR 1.3s protime 13.0.  --DVT ppx: TEDs/SCDs  --Bowel regimen: Miralax, senna, lactulose 2/23. + BM.  --Activity: PT/OT OOB, TLSO brace when OOB.  --Continue to monitor clinically, notify NSGY immediately with any changes in neuro status.    Discussed with Dr. Templeton.    Dispo: OR on 3/2/22 for reinstrumentation.

## 2022-03-01 NOTE — SUBJECTIVE & OBJECTIVE
Interval History: NAEON. Neuro stable. H&H 7.4 & 24.1 today. Transfusing pRBCs and platelets prior to surgery tomorrow am. HV drains to remain in place. Discussed surgical plan, risks, benefits and alternatives with patient. Patient voiced understanding.     Medications:  Continuous Infusions:   sodium chloride 0.9% 10 mL/hr at 02/27/22 0305     Scheduled Meds:   acetaminophen  1,000 mg Oral Q8H    cefTRIAXone (ROCEPHIN) IVPB  2 g Intravenous Q12H    heparin (porcine)  5,000 Units Subcutaneous Q8H    pantoprazole  40 mg Oral Daily    pregabalin  50 mg Oral TID    senna-docusate 8.6-50 mg  1 tablet Oral Daily    sertraline  50 mg Oral Daily    sodium chloride 0.9%  10 mL Intravenous Q6H     PRN Meds:sodium chloride, sodium chloride, sodium chloride, aluminum-magnesium hydroxide-simethicone, dextrose 10%, dextrose 10%, glucagon (human recombinant), glucose, glucose, HYDROmorphone, melatonin, oxyCODONE, oxyCODONE, polyethylene glycol, Flushing PICC Protocol **AND** sodium chloride 0.9% **AND** sodium chloride 0.9%     Objective:     Weight: 133.8 kg (294 lb 15.6 oz)  Body mass index is 46.2 kg/m².  Vital Signs (Most Recent):  Temp: 98.6 °F (37 °C) (03/01/22 1227)  Pulse: 86 (03/01/22 1227)  Resp: 16 (03/01/22 1227)  BP: 111/61 (03/01/22 1227)  SpO2: 96 % (03/01/22 1227) Vital Signs (24h Range):  Temp:  [97.6 °F (36.4 °C)-98.6 °F (37 °C)] 98.6 °F (37 °C)  Pulse:  [86-96] 86  Resp:  [16-20] 16  SpO2:  [96 %-100 %] 96 %  BP: (101-122)/(56-63) 111/61                          Closed/Suction Drain 02/21/22 1644 Left;Medial Back Accordion 10 Fr. (Active)   Site Description Healing 02/28/22 2053   Dressing Type Other (Comment) 02/28/22 2053   Dressing Status Intact 02/27/22 2048   Dressing Intervention Integrity maintained 02/28/22 1800   Drainage Serosanguineous 02/28/22 2053   Status To bulb suction 02/28/22 2053   Output (mL) 100 mL 03/01/22 0500            Closed/Suction Drain 02/21/22 1645 Right;Medial Back Accordion 10  Fr. (Active)   Site Description Healing 02/28/22 2053   Dressing Type Other (Comment) 02/28/22 2053   Dressing Status Intact 02/28/22 1800   Dressing Intervention Integrity maintained 02/28/22 1800   Drainage Serosanguineous 02/28/22 2053   Status To bulb suction 02/28/22 2053   Output (mL) 20 mL 03/01/22 0500       Female External Urinary Catheter 02/22/22 2300 (Active)   Skin no redness;no breakdown 02/28/22 2053   Tolerance no signs/symptoms of discomfort 02/28/22 2053   Suction Continuous suction at 40 mmHg 02/28/22 2053   Date of last wick change 02/26/22 02/28/22 2053   Time of last wick change 2000 02/26/22 2000   Output (mL) 500 mL 02/27/22 0701     Neurosurgery Physical Exam  General: Well developed, well nourished, not in acute distress.   Head: Normocephalic, atraumatic.  Neck: Full ROM.   Neurologic: Alert and oriented x 4. Thought content appropriate.  GCS: Motor:6  Verbal:5  Eyes:4   GCS Total: 15  Language: No aphasia.  Speech: No dysarthria.  Cranial nerves: Face symmetric, tongue midline, CN II-XII grossly intact.   Eyes: Pupils equal, round, reactive to light with accomodation, EOMI.   Pulmonary: Normal respirations, no signs of respiratory distress.  Abdomen: Soft, non-distended, non-tender to palpation.  Sensory: Intact to light touch throughout.  Motor Strength: Moves all extremities spontaneously with good tone. No abnormal movements seen.      Strength   Deltoids Triceps Biceps Wrist Extension Wrist Flexion Hand    Upper: R 5/5 5/5 5/5 5/5 5/5 5/5     L 5/5 5/5 5/5 5/5 5/5 5/5       Hip Flexion Knee Extension Knee  Flexion Dorsiflexion Plantar flexion EHL   Lower: R 5/5 5/5 5/5 5/5 5/5 5/5     L 4/5 4/5 4/5 5/5 5/5 5/5      Parker: Absent.  Clonus: 3 beats bilaterally.  Skin: Skin is warm, dry and intact.     Incision c/d/i with staples. No surrounding erythema or edema. No drainage from incision. No palpable hematoma or underlying fluid collection.     HV drains in  place.    Significant Labs:  Recent Labs   Lab 02/28/22  0614 03/01/22  0532   GLU 88 94    138   K 3.9 3.8    105   CO2 31* 29   BUN 18 16   CREATININE 0.5 0.5   CALCIUM 7.8* 7.6*     Recent Labs   Lab 02/28/22  0614 03/01/22  0532   WBC 1.63* 1.50*   HGB 7.6* 7.4*   HCT 24.2* 24.1*   PLT 78* 67*     Recent Labs   Lab 02/28/22  0614 03/01/22  0532   INR 1.3* 1.3*     Microbiology Results (last 7 days)       Procedure Component Value Units Date/Time    Aerobic culture [[id number]] Collected: 02/21/22 1539    Order Status: Completed Specimen: Abscess from Back Updated: 02/25/22 0957     Aerobic Bacterial Culture No growth    Narrative:      Epidural purulence #1    Culture, Anaerobe [[id number]] Collected: 02/21/22 1539    Order Status: Completed Specimen: Abscess from Back Updated: 02/25/22 0728     Anaerobic Culture No anaerobes isolated    Narrative:      Epidural purulence #2    Culture, Anaerobe [[id number]] Collected: 02/21/22 1539    Order Status: Completed Specimen: Abscess from Back Updated: 02/25/22 0728     Anaerobic Culture No anaerobes isolated    Narrative:      Epidural purulence #1    Aerobic culture [[id number]] Collected: 02/21/22 1539    Order Status: Completed Specimen: Abscess from Back Updated: 02/24/22 1124     Aerobic Bacterial Culture No growth    Narrative:      Epidural purulence #2    AFB Culture & Smear [[id number]] Collected: 02/21/22 1539    Order Status: Completed Specimen: Abscess from Back Updated: 02/22/22 2127     AFB Culture & Smear Culture in progress     AFB CULTURE STAIN No acid fast bacilli seen.    Narrative:      Epidural purulence #1    AFB Culture & Smear [[id number]] Collected: 02/21/22 1539    Order Status: Completed Specimen: Abscess from Back Updated: 02/22/22 2127     AFB Culture & Smear Culture in progress     AFB CULTURE STAIN No acid fast bacilli seen.    Narrative:      Epidural purulence #2          All pertinent labs from the last 24 hours have been  reviewed.    Significant Diagnostics:  I have reviewed and interpreted all pertinent imaging results/findings within the past 24 hours.

## 2022-03-01 NOTE — PLAN OF CARE
Problem: Fall Injury Risk  Goal: Absence of Fall and Fall-Related Injury  Outcome: Ongoing, Progressing     Problem: Infection  Goal: Absence of Infection Signs and Symptoms  Outcome: Ongoing, Progressing     Pt. Gets out of bed with physical therapy and sits up in chair to 2 hours and tolerated it well. Pt. Also on antibiotics at this time.

## 2022-03-02 ENCOUNTER — ANESTHESIA (OUTPATIENT)
Dept: SURGERY | Facility: HOSPITAL | Age: 42
DRG: 460 | End: 2022-03-02
Payer: MEDICAID

## 2022-03-02 LAB
ABO + RH BLD: NORMAL
ALBUMIN SERPL BCP-MCNC: 1.8 G/DL (ref 3.5–5.2)
ALP SERPL-CCNC: 98 U/L (ref 55–135)
ALT SERPL W/O P-5'-P-CCNC: 24 U/L (ref 10–44)
ANION GAP SERPL CALC-SCNC: 6 MMOL/L (ref 8–16)
ANION GAP SERPL CALC-SCNC: 7 MMOL/L (ref 8–16)
ANISOCYTOSIS BLD QL SMEAR: SLIGHT
AST SERPL-CCNC: 59 U/L (ref 10–40)
BASOPHILS # BLD AUTO: 0.01 K/UL (ref 0–0.2)
BASOPHILS # BLD AUTO: 0.01 K/UL (ref 0–0.2)
BASOPHILS NFR BLD: 0.2 % (ref 0–1.9)
BASOPHILS NFR BLD: 0.7 % (ref 0–1.9)
BILIRUB SERPL-MCNC: 0.7 MG/DL (ref 0.1–1)
BLD GP AB SCN CELLS X3 SERPL QL: NORMAL
BLD PROD TYP BPU: NORMAL
BLOOD UNIT EXPIRATION DATE: NORMAL
BLOOD UNIT TYPE CODE: 6200
BLOOD UNIT TYPE: NORMAL
BUN SERPL-MCNC: 15 MG/DL (ref 6–20)
BUN SERPL-MCNC: 16 MG/DL (ref 6–20)
CALCIUM SERPL-MCNC: 7.6 MG/DL (ref 8.7–10.5)
CALCIUM SERPL-MCNC: 7.7 MG/DL (ref 8.7–10.5)
CHLORIDE SERPL-SCNC: 104 MMOL/L (ref 95–110)
CHLORIDE SERPL-SCNC: 108 MMOL/L (ref 95–110)
CO2 SERPL-SCNC: 26 MMOL/L (ref 23–29)
CO2 SERPL-SCNC: 26 MMOL/L (ref 23–29)
CODING SYSTEM: NORMAL
CREAT SERPL-MCNC: 0.6 MG/DL (ref 0.5–1.4)
CREAT SERPL-MCNC: 0.6 MG/DL (ref 0.5–1.4)
DACRYOCYTES BLD QL SMEAR: ABNORMAL
DIFFERENTIAL METHOD: ABNORMAL
DIFFERENTIAL METHOD: ABNORMAL
DISPENSE STATUS: NORMAL
EOSINOPHIL # BLD AUTO: 0 K/UL (ref 0–0.5)
EOSINOPHIL # BLD AUTO: 0.1 K/UL (ref 0–0.5)
EOSINOPHIL NFR BLD: 0.2 % (ref 0–8)
EOSINOPHIL NFR BLD: 6.8 % (ref 0–8)
ERYTHROCYTE [DISTWIDTH] IN BLOOD BY AUTOMATED COUNT: 17 % (ref 11.5–14.5)
ERYTHROCYTE [DISTWIDTH] IN BLOOD BY AUTOMATED COUNT: 17.1 % (ref 11.5–14.5)
EST. GFR  (AFRICAN AMERICAN): >60 ML/MIN/1.73 M^2
EST. GFR  (AFRICAN AMERICAN): >60 ML/MIN/1.73 M^2
EST. GFR  (NON AFRICAN AMERICAN): >60 ML/MIN/1.73 M^2
EST. GFR  (NON AFRICAN AMERICAN): >60 ML/MIN/1.73 M^2
GLUCOSE SERPL-MCNC: 111 MG/DL (ref 70–110)
GLUCOSE SERPL-MCNC: 125 MG/DL (ref 70–110)
GRAM STN SPEC: NORMAL
GRAM STN SPEC: NORMAL
HCT VFR BLD AUTO: 23.9 % (ref 37–48.5)
HCT VFR BLD AUTO: 27.2 % (ref 37–48.5)
HGB BLD-MCNC: 7.4 G/DL (ref 12–16)
HGB BLD-MCNC: 8.6 G/DL (ref 12–16)
HYPOCHROMIA BLD QL SMEAR: ABNORMAL
IMM GRANULOCYTES # BLD AUTO: 0.01 K/UL (ref 0–0.04)
IMM GRANULOCYTES # BLD AUTO: 0.05 K/UL (ref 0–0.04)
IMM GRANULOCYTES NFR BLD AUTO: 0.7 % (ref 0–0.5)
IMM GRANULOCYTES NFR BLD AUTO: 1.1 % (ref 0–0.5)
INR PPP: 1.3 (ref 0.8–1.2)
LYMPHOCYTES # BLD AUTO: 0.3 K/UL (ref 1–4.8)
LYMPHOCYTES # BLD AUTO: 0.4 K/UL (ref 1–4.8)
LYMPHOCYTES NFR BLD: 28.1 % (ref 18–48)
LYMPHOCYTES NFR BLD: 5.7 % (ref 18–48)
MCH RBC QN AUTO: 28.4 PG (ref 27–31)
MCH RBC QN AUTO: 29.7 PG (ref 27–31)
MCHC RBC AUTO-ENTMCNC: 31 G/DL (ref 32–36)
MCHC RBC AUTO-ENTMCNC: 31.6 G/DL (ref 32–36)
MCV RBC AUTO: 92 FL (ref 82–98)
MCV RBC AUTO: 94 FL (ref 82–98)
MONOCYTES # BLD AUTO: 0.1 K/UL (ref 0.3–1)
MONOCYTES # BLD AUTO: 0.2 K/UL (ref 0.3–1)
MONOCYTES NFR BLD: 1.5 % (ref 4–15)
MONOCYTES NFR BLD: 11.6 % (ref 4–15)
NEUTROPHILS # BLD AUTO: 0.8 K/UL (ref 1.8–7.7)
NEUTROPHILS # BLD AUTO: 4.2 K/UL (ref 1.8–7.7)
NEUTROPHILS NFR BLD: 52.1 % (ref 38–73)
NEUTROPHILS NFR BLD: 91.3 % (ref 38–73)
NRBC BLD-RTO: 0 /100 WBC
NRBC BLD-RTO: 0 /100 WBC
NUM UNITS TRANS FFP: NORMAL
OVALOCYTES BLD QL SMEAR: ABNORMAL
PLATELET # BLD AUTO: 72 K/UL (ref 150–450)
PLATELET # BLD AUTO: 92 K/UL (ref 150–450)
PLATELET BLD QL SMEAR: ABNORMAL
PMV BLD AUTO: 9.1 FL (ref 9.2–12.9)
PMV BLD AUTO: 9.6 FL (ref 9.2–12.9)
POIKILOCYTOSIS BLD QL SMEAR: SLIGHT
POLYCHROMASIA BLD QL SMEAR: ABNORMAL
POTASSIUM SERPL-SCNC: 3.7 MMOL/L (ref 3.5–5.1)
POTASSIUM SERPL-SCNC: 4.1 MMOL/L (ref 3.5–5.1)
PROT SERPL-MCNC: 5.4 G/DL (ref 6–8.4)
PROTHROMBIN TIME: 13.2 SEC (ref 9–12.5)
RBC # BLD AUTO: 2.61 M/UL (ref 4–5.4)
RBC # BLD AUTO: 2.9 M/UL (ref 4–5.4)
SARS-COV-2 RDRP RESP QL NAA+PROBE: NEGATIVE
SODIUM SERPL-SCNC: 137 MMOL/L (ref 136–145)
SODIUM SERPL-SCNC: 140 MMOL/L (ref 136–145)
TRANS ERYTHROCYTES VOL PATIENT: NORMAL ML
TRANS ERYTHROCYTES VOL PATIENT: NORMAL ML
UNIT NUMBER: NORMAL
WBC # BLD AUTO: 1.46 K/UL (ref 3.9–12.7)
WBC # BLD AUTO: 4.6 K/UL (ref 3.9–12.7)

## 2022-03-02 PROCEDURE — 22633 ARTHRD CMBN 1NTRSPC LUMBAR: CPT | Mod: 22,58,62, | Performed by: ORTHOPAEDIC SURGERY

## 2022-03-02 PROCEDURE — D9220A PRA ANESTHESIA: ICD-10-PCS | Mod: ,,, | Performed by: ANESTHESIOLOGY

## 2022-03-02 PROCEDURE — P9021 RED BLOOD CELLS UNIT: HCPCS | Performed by: STUDENT IN AN ORGANIZED HEALTH CARE EDUCATION/TRAINING PROGRAM

## 2022-03-02 PROCEDURE — 36556 CENTRAL LINE: ICD-10-PCS | Mod: 59,,, | Performed by: ANESTHESIOLOGY

## 2022-03-02 PROCEDURE — 63600175 PHARM REV CODE 636 W HCPCS: Performed by: ANESTHESIOLOGY

## 2022-03-02 PROCEDURE — D9220A PRA ANESTHESIA: Mod: ,,, | Performed by: ANESTHESIOLOGY

## 2022-03-02 PROCEDURE — 63600175 PHARM REV CODE 636 W HCPCS: Performed by: PHYSICIAN ASSISTANT

## 2022-03-02 PROCEDURE — 87205 SMEAR GRAM STAIN: CPT | Performed by: NEUROLOGICAL SURGERY

## 2022-03-02 PROCEDURE — 85610 PROTHROMBIN TIME: CPT

## 2022-03-02 PROCEDURE — 22633 PR ARTHRODESIS, COMBINED TECHN, SNGL INTERSPACE, LUMBAR: ICD-10-PCS | Mod: 22,58,62, | Performed by: ORTHOPAEDIC SURGERY

## 2022-03-02 PROCEDURE — 22633 ARTHRD CMBN 1NTRSPC LUMBAR: CPT | Mod: 22,58,62, | Performed by: NEUROLOGICAL SURGERY

## 2022-03-02 PROCEDURE — 86920 COMPATIBILITY TEST SPIN: CPT | Performed by: NEUROLOGICAL SURGERY

## 2022-03-02 PROCEDURE — 87206 SMEAR FLUORESCENT/ACID STAI: CPT | Performed by: NEUROLOGICAL SURGERY

## 2022-03-02 PROCEDURE — U0002 COVID-19 LAB TEST NON-CDC: HCPCS | Performed by: NEUROLOGICAL SURGERY

## 2022-03-02 PROCEDURE — 22614 ARTHRD PST TQ 1NTRSPC EA ADD: CPT | Mod: 62,,, | Performed by: ORTHOPAEDIC SURGERY

## 2022-03-02 PROCEDURE — 25000003 PHARM REV CODE 250: Performed by: STUDENT IN AN ORGANIZED HEALTH CARE EDUCATION/TRAINING PROGRAM

## 2022-03-02 PROCEDURE — 86901 BLOOD TYPING SEROLOGIC RH(D): CPT | Performed by: STUDENT IN AN ORGANIZED HEALTH CARE EDUCATION/TRAINING PROGRAM

## 2022-03-02 PROCEDURE — 25000003 PHARM REV CODE 250: Performed by: NEUROLOGICAL SURGERY

## 2022-03-02 PROCEDURE — 86920 COMPATIBILITY TEST SPIN: CPT | Performed by: STUDENT IN AN ORGANIZED HEALTH CARE EDUCATION/TRAINING PROGRAM

## 2022-03-02 PROCEDURE — 20930 SP BONE ALGRFT MORSEL ADD-ON: CPT | Mod: ,,, | Performed by: NEUROLOGICAL SURGERY

## 2022-03-02 PROCEDURE — 36000710: Performed by: NEUROLOGICAL SURGERY

## 2022-03-02 PROCEDURE — 94761 N-INVAS EAR/PLS OXIMETRY MLT: CPT

## 2022-03-02 PROCEDURE — 22843 INSERT SPINE FIXATION DEVICE: CPT | Mod: ,,, | Performed by: NEUROLOGICAL SURGERY

## 2022-03-02 PROCEDURE — C1729 CATH, DRAINAGE: HCPCS | Performed by: NEUROLOGICAL SURGERY

## 2022-03-02 PROCEDURE — 22853 PR INSERT BIOMECH DEV W/INTERBODY ARTHRODESIS, EA CONTIGUOUS DEFECT: ICD-10-PCS | Mod: 80,,, | Performed by: ORTHOPAEDIC SURGERY

## 2022-03-02 PROCEDURE — 22848 PR PELVIC FIXATION OTHER THAN SACRUM: ICD-10-PCS | Mod: ,,, | Performed by: NEUROLOGICAL SURGERY

## 2022-03-02 PROCEDURE — 22843 INSERT SPINE FIXATION DEVICE: CPT | Mod: 80,,, | Performed by: ORTHOPAEDIC SURGERY

## 2022-03-02 PROCEDURE — 80048 BASIC METABOLIC PNL TOTAL CA: CPT | Performed by: STUDENT IN AN ORGANIZED HEALTH CARE EDUCATION/TRAINING PROGRAM

## 2022-03-02 PROCEDURE — 22614 PR ARTHRODESIS, POST/POSTLAT, SNGL INTERSPACE, EA ADDTL: ICD-10-PCS | Mod: 62,,, | Performed by: NEUROLOGICAL SURGERY

## 2022-03-02 PROCEDURE — P9035 PLATELET PHERES LEUKOREDUCED: HCPCS

## 2022-03-02 PROCEDURE — 27800903 OPTIME MED/SURG SUP & DEVICES OTHER IMPLANTS: Performed by: NEUROLOGICAL SURGERY

## 2022-03-02 PROCEDURE — 37000008 HC ANESTHESIA 1ST 15 MINUTES: Performed by: NEUROLOGICAL SURGERY

## 2022-03-02 PROCEDURE — 80053 COMPREHEN METABOLIC PANEL: CPT

## 2022-03-02 PROCEDURE — 22843 PR POSTERIOR SEGMENTAL INSTRUMENTATION 7-12 VRT SEG: ICD-10-PCS | Mod: 80,,, | Performed by: ORTHOPAEDIC SURGERY

## 2022-03-02 PROCEDURE — 11982 REMOVE DRUG IMPLANT DEVICE: CPT | Mod: ,,, | Performed by: NEUROLOGICAL SURGERY

## 2022-03-02 PROCEDURE — 63600175 PHARM REV CODE 636 W HCPCS: Performed by: NEUROLOGICAL SURGERY

## 2022-03-02 PROCEDURE — 20000000 HC ICU ROOM

## 2022-03-02 PROCEDURE — 25000003 PHARM REV CODE 250: Performed by: PHYSICIAN ASSISTANT

## 2022-03-02 PROCEDURE — 22633 PR ARTHRODESIS, COMBINED TECHN, SNGL INTERSPACE, LUMBAR: ICD-10-PCS | Mod: 22,58,62, | Performed by: NEUROLOGICAL SURGERY

## 2022-03-02 PROCEDURE — 86920 COMPATIBILITY TEST SPIN: CPT

## 2022-03-02 PROCEDURE — A4216 STERILE WATER/SALINE, 10 ML: HCPCS | Performed by: NEUROLOGICAL SURGERY

## 2022-03-02 PROCEDURE — 87070 CULTURE OTHR SPECIMN AEROBIC: CPT | Performed by: NEUROLOGICAL SURGERY

## 2022-03-02 PROCEDURE — 86900 BLOOD TYPING SEROLOGIC ABO: CPT | Performed by: STUDENT IN AN ORGANIZED HEALTH CARE EDUCATION/TRAINING PROGRAM

## 2022-03-02 PROCEDURE — 37000009 HC ANESTHESIA EA ADD 15 MINS: Performed by: NEUROLOGICAL SURGERY

## 2022-03-02 PROCEDURE — 27201423 OPTIME MED/SURG SUP & DEVICES STERILE SUPPLY: Performed by: NEUROLOGICAL SURGERY

## 2022-03-02 PROCEDURE — 36556 INSERT NON-TUNNEL CV CATH: CPT | Mod: 59,,, | Performed by: ANESTHESIOLOGY

## 2022-03-02 PROCEDURE — 27201037 HC PRESSURE MONITORING SET UP

## 2022-03-02 PROCEDURE — 22853 INSJ BIOMECHANICAL DEVICE: CPT | Mod: 80,,, | Performed by: ORTHOPAEDIC SURGERY

## 2022-03-02 PROCEDURE — 25000003 PHARM REV CODE 250: Performed by: ANESTHESIOLOGY

## 2022-03-02 PROCEDURE — 61783 PR STEREOTACTIC COMP ASSIST PROC,SPINAL: ICD-10-PCS | Mod: ,,, | Performed by: NEUROLOGICAL SURGERY

## 2022-03-02 PROCEDURE — 36620 INSERTION CATHETER ARTERY: CPT | Mod: 59,,, | Performed by: ANESTHESIOLOGY

## 2022-03-02 PROCEDURE — 22853 INSJ BIOMECHANICAL DEVICE: CPT | Mod: ,,, | Performed by: NEUROLOGICAL SURGERY

## 2022-03-02 PROCEDURE — 87116 MYCOBACTERIA CULTURE: CPT | Performed by: NEUROLOGICAL SURGERY

## 2022-03-02 PROCEDURE — 22848 PR PELVIC FIXATION OTHER THAN SACRUM: ICD-10-PCS | Mod: 80,,, | Performed by: ORTHOPAEDIC SURGERY

## 2022-03-02 PROCEDURE — 63030 LAMOT DCMPRN NRV RT 1 LMBR: CPT | Mod: 58,52,59,LT | Performed by: NEUROLOGICAL SURGERY

## 2022-03-02 PROCEDURE — 22853 PR INSERT BIOMECH DEV W/INTERBODY ARTHRODESIS, EA CONTIGUOUS DEFECT: ICD-10-PCS | Mod: ,,, | Performed by: NEUROLOGICAL SURGERY

## 2022-03-02 PROCEDURE — 36000711: Performed by: NEUROLOGICAL SURGERY

## 2022-03-02 PROCEDURE — 22614 ARTHRD PST TQ 1NTRSPC EA ADD: CPT | Mod: 62,,, | Performed by: NEUROLOGICAL SURGERY

## 2022-03-02 PROCEDURE — C1751 CATH, INF, PER/CENT/MIDLINE: HCPCS

## 2022-03-02 PROCEDURE — 85025 COMPLETE CBC W/AUTO DIFF WBC: CPT | Performed by: STUDENT IN AN ORGANIZED HEALTH CARE EDUCATION/TRAINING PROGRAM

## 2022-03-02 PROCEDURE — 22848 INSERT PELV FIXATION DEVICE: CPT | Mod: ,,, | Performed by: NEUROLOGICAL SURGERY

## 2022-03-02 PROCEDURE — 87075 CULTR BACTERIA EXCEPT BLOOD: CPT | Performed by: NEUROLOGICAL SURGERY

## 2022-03-02 PROCEDURE — C1713 ANCHOR/SCREW BN/BN,TIS/BN: HCPCS | Performed by: NEUROLOGICAL SURGERY

## 2022-03-02 PROCEDURE — 63030 PR LAMINOTOMY,LUMBAR DISK,1 INTRSP: ICD-10-PCS | Mod: 58,52,59,LT | Performed by: NEUROLOGICAL SURGERY

## 2022-03-02 PROCEDURE — 63600175 PHARM REV CODE 636 W HCPCS: Performed by: STUDENT IN AN ORGANIZED HEALTH CARE EDUCATION/TRAINING PROGRAM

## 2022-03-02 PROCEDURE — 22848 INSERT PELV FIXATION DEVICE: CPT | Mod: 80,,, | Performed by: ORTHOPAEDIC SURGERY

## 2022-03-02 PROCEDURE — 85025 COMPLETE CBC W/AUTO DIFF WBC: CPT | Mod: 91 | Performed by: STUDENT IN AN ORGANIZED HEALTH CARE EDUCATION/TRAINING PROGRAM

## 2022-03-02 PROCEDURE — 61783 SCAN PROC SPINAL: CPT | Mod: ,,, | Performed by: NEUROLOGICAL SURGERY

## 2022-03-02 PROCEDURE — 87102 FUNGUS ISOLATION CULTURE: CPT | Performed by: NEUROLOGICAL SURGERY

## 2022-03-02 PROCEDURE — 20930 PR ALLOGRAFT FOR SPINE SURGERY ONLY MORSELIZED: ICD-10-PCS | Mod: ,,, | Performed by: NEUROLOGICAL SURGERY

## 2022-03-02 PROCEDURE — 36620 ARTERIAL: ICD-10-PCS | Mod: 59,,, | Performed by: ANESTHESIOLOGY

## 2022-03-02 PROCEDURE — 11982 PR REMOVAL DRUG IMPLANT DEVICE: ICD-10-PCS | Mod: ,,, | Performed by: NEUROLOGICAL SURGERY

## 2022-03-02 PROCEDURE — 22843 PR POSTERIOR SEGMENTAL INSTRUMENTATION 7-12 VRT SEG: ICD-10-PCS | Mod: ,,, | Performed by: NEUROLOGICAL SURGERY

## 2022-03-02 PROCEDURE — 22614 PR ARTHRODESIS, POST/POSTLAT, SNGL INTERSPACE, EA ADDTL: ICD-10-PCS | Mod: 62,,, | Performed by: ORTHOPAEDIC SURGERY

## 2022-03-02 DEVICE — SCREW EXPEDIUM VERSE 5.5 6X45: Type: IMPLANTABLE DEVICE | Site: BACK | Status: FUNCTIONAL

## 2022-03-02 DEVICE — SCREW EXPEDIUM PA TI 8X45MM: Type: IMPLANTABLE DEVICE | Site: BACK | Status: FUNCTIONAL

## 2022-03-02 DEVICE — SCREW BONE SPINAL 5.5 6 X 45MM: Type: IMPLANTABLE DEVICE | Site: BACK | Status: FUNCTIONAL

## 2022-03-02 DEVICE — KIT BONE GRFT BMP XS: Type: IMPLANTABLE DEVICE | Site: BACK | Status: FUNCTIONAL

## 2022-03-02 DEVICE — CAGE POST LUM LORDTC 11MM 9X22: Type: IMPLANTABLE DEVICE | Site: BACK | Status: FUNCTIONAL

## 2022-03-02 DEVICE — SCREW EXPEDIUM VERSE PA 8X80MM: Type: IMPLANTABLE DEVICE | Site: BACK | Status: FUNCTIONAL

## 2022-03-02 DEVICE — ROD SPINE MTRX STR 400X80MM: Type: IMPLANTABLE DEVICE | Site: BACK | Status: FUNCTIONAL

## 2022-03-02 DEVICE — BONE 30CC CANCELLOUS CRUSHED: Type: IMPLANTABLE DEVICE | Site: BACK | Status: FUNCTIONAL

## 2022-03-02 DEVICE — SET SCREW EXPEDIUM VERSE UNITZ: Type: IMPLANTABLE DEVICE | Site: BACK | Status: FUNCTIONAL

## 2022-03-02 DEVICE — IMPLANTABLE DEVICE: Type: IMPLANTABLE DEVICE | Site: BACK | Status: FUNCTIONAL

## 2022-03-02 DEVICE — SCREW BONE SPINAL 5.5 5 X 40MM: Type: IMPLANTABLE DEVICE | Site: BACK | Status: FUNCTIONAL

## 2022-03-02 DEVICE — SCREW BONE SPINAL 5.5 6 X 40MM: Type: IMPLANTABLE DEVICE | Site: BACK | Status: FUNCTIONAL

## 2022-03-02 DEVICE — SCREW INNER SINGLE SET TITANIU: Type: IMPLANTABLE DEVICE | Site: BACK | Status: FUNCTIONAL

## 2022-03-02 DEVICE — CONNECTOR SPINAL SFX A6 5.5MM: Type: IMPLANTABLE DEVICE | Site: BACK | Status: FUNCTIONAL

## 2022-03-02 DEVICE — SCREW EXPEDIUM FEN STRL 5X40MM: Type: IMPLANTABLE DEVICE | Site: BACK | Status: FUNCTIONAL

## 2022-03-02 RX ORDER — PREGABALIN 75 MG/1
75 CAPSULE ORAL 3 TIMES DAILY
Status: DISCONTINUED | OUTPATIENT
Start: 2022-03-02 | End: 2022-03-15 | Stop reason: HOSPADM

## 2022-03-02 RX ORDER — FENTANYL CITRATE 50 UG/ML
25 INJECTION, SOLUTION INTRAMUSCULAR; INTRAVENOUS EVERY 5 MIN PRN
Status: CANCELLED | OUTPATIENT
Start: 2022-03-02

## 2022-03-02 RX ORDER — ONDANSETRON 2 MG/ML
4 INJECTION INTRAMUSCULAR; INTRAVENOUS DAILY PRN
Status: CANCELLED | OUTPATIENT
Start: 2022-03-02

## 2022-03-02 RX ORDER — CEFAZOLIN SODIUM 1 G/3ML
INJECTION, POWDER, FOR SOLUTION INTRAMUSCULAR; INTRAVENOUS
Status: DISCONTINUED | OUTPATIENT
Start: 2022-03-02 | End: 2022-03-02

## 2022-03-02 RX ORDER — SUCCINYLCHOLINE CHLORIDE 20 MG/ML
INJECTION INTRAMUSCULAR; INTRAVENOUS
Status: DISCONTINUED | OUTPATIENT
Start: 2022-03-02 | End: 2022-03-02

## 2022-03-02 RX ORDER — HALOPERIDOL 5 MG/ML
0.5 INJECTION INTRAMUSCULAR EVERY 10 MIN PRN
Status: CANCELLED | OUTPATIENT
Start: 2022-03-02

## 2022-03-02 RX ORDER — KETAMINE HYDROCHLORIDE 100 MG/ML
INJECTION, SOLUTION INTRAMUSCULAR; INTRAVENOUS
Status: DISCONTINUED | OUTPATIENT
Start: 2022-03-02 | End: 2022-03-02

## 2022-03-02 RX ORDER — ROCURONIUM BROMIDE 10 MG/ML
INJECTION, SOLUTION INTRAVENOUS
Status: DISCONTINUED | OUTPATIENT
Start: 2022-03-02 | End: 2022-03-02

## 2022-03-02 RX ORDER — FENTANYL CITRATE 50 UG/ML
INJECTION, SOLUTION INTRAMUSCULAR; INTRAVENOUS
Status: DISCONTINUED | OUTPATIENT
Start: 2022-03-02 | End: 2022-03-02

## 2022-03-02 RX ORDER — PREGABALIN 75 MG/1
75 CAPSULE ORAL 3 TIMES DAILY
Status: DISCONTINUED | OUTPATIENT
Start: 2022-03-02 | End: 2022-03-02

## 2022-03-02 RX ORDER — LIDOCAINE 50 MG/G
1 PATCH TOPICAL
Status: DISCONTINUED | OUTPATIENT
Start: 2022-03-02 | End: 2022-03-15 | Stop reason: HOSPADM

## 2022-03-02 RX ORDER — TRANEXAMIC ACID 100 MG/ML
INJECTION, SOLUTION INTRAVENOUS
Status: DISCONTINUED | OUTPATIENT
Start: 2022-03-02 | End: 2022-03-02

## 2022-03-02 RX ORDER — PROPOFOL 10 MG/ML
VIAL (ML) INTRAVENOUS
Status: DISCONTINUED | OUTPATIENT
Start: 2022-03-02 | End: 2022-03-02

## 2022-03-02 RX ORDER — ACETAMINOPHEN 500 MG
1000 TABLET ORAL ONCE
Status: COMPLETED | OUTPATIENT
Start: 2022-03-02 | End: 2022-03-02

## 2022-03-02 RX ORDER — ONDANSETRON 2 MG/ML
INJECTION INTRAMUSCULAR; INTRAVENOUS
Status: DISCONTINUED | OUTPATIENT
Start: 2022-03-02 | End: 2022-03-02

## 2022-03-02 RX ORDER — DEXAMETHASONE SODIUM PHOSPHATE 4 MG/ML
INJECTION, SOLUTION INTRA-ARTICULAR; INTRALESIONAL; INTRAMUSCULAR; INTRAVENOUS; SOFT TISSUE
Status: DISCONTINUED | OUTPATIENT
Start: 2022-03-02 | End: 2022-03-02

## 2022-03-02 RX ORDER — PROPOFOL 10 MG/ML
VIAL (ML) INTRAVENOUS CONTINUOUS PRN
Status: DISCONTINUED | OUTPATIENT
Start: 2022-03-02 | End: 2022-03-02

## 2022-03-02 RX ORDER — PHENYLEPHRINE HYDROCHLORIDE 10 MG/ML
INJECTION INTRAVENOUS
Status: DISCONTINUED | OUTPATIENT
Start: 2022-03-02 | End: 2022-03-02

## 2022-03-02 RX ORDER — MIDAZOLAM HYDROCHLORIDE 5 MG/ML
INJECTION INTRAMUSCULAR; INTRAVENOUS
Status: DISCONTINUED | OUTPATIENT
Start: 2022-03-02 | End: 2022-03-02

## 2022-03-02 RX ORDER — LIDOCAINE HYDROCHLORIDE 20 MG/ML
INJECTION INTRAVENOUS
Status: DISCONTINUED | OUTPATIENT
Start: 2022-03-02 | End: 2022-03-02

## 2022-03-02 RX ORDER — HYDROMORPHONE HYDROCHLORIDE 2 MG/ML
INJECTION, SOLUTION INTRAMUSCULAR; INTRAVENOUS; SUBCUTANEOUS
Status: DISCONTINUED | OUTPATIENT
Start: 2022-03-02 | End: 2022-03-02

## 2022-03-02 RX ORDER — DEXMEDETOMIDINE HYDROCHLORIDE 100 UG/ML
INJECTION, SOLUTION INTRAVENOUS
Status: DISCONTINUED | OUTPATIENT
Start: 2022-03-02 | End: 2022-03-02

## 2022-03-02 RX ORDER — HYDROMORPHONE HYDROCHLORIDE 1 MG/ML
0.2 INJECTION, SOLUTION INTRAMUSCULAR; INTRAVENOUS; SUBCUTANEOUS EVERY 5 MIN PRN
Status: CANCELLED | OUTPATIENT
Start: 2022-03-02

## 2022-03-02 RX ORDER — VANCOMYCIN HYDROCHLORIDE 1 G/20ML
INJECTION, POWDER, LYOPHILIZED, FOR SOLUTION INTRAVENOUS
Status: DISCONTINUED | OUTPATIENT
Start: 2022-03-02 | End: 2022-03-02 | Stop reason: HOSPADM

## 2022-03-02 RX ADMIN — OXYCODONE HYDROCHLORIDE 15 MG: 10 TABLET ORAL at 07:03

## 2022-03-02 RX ADMIN — MIDAZOLAM 2 MG: 5 INJECTION INTRAMUSCULAR; INTRAVENOUS at 08:03

## 2022-03-02 RX ADMIN — Medication 10 ML: at 06:03

## 2022-03-02 RX ADMIN — ROCURONIUM BROMIDE 10 MG: 10 INJECTION, SOLUTION INTRAVENOUS at 08:03

## 2022-03-02 RX ADMIN — KETAMINE HYDROCHLORIDE 10 MG: 100 INJECTION, SOLUTION, CONCENTRATE INTRAMUSCULAR; INTRAVENOUS at 11:03

## 2022-03-02 RX ADMIN — CEFTRIAXONE 2 G: 2 INJECTION, POWDER, FOR SOLUTION INTRAMUSCULAR; INTRAVENOUS at 02:03

## 2022-03-02 RX ADMIN — ROCURONIUM BROMIDE 30 MG: 10 INJECTION, SOLUTION INTRAVENOUS at 09:03

## 2022-03-02 RX ADMIN — FENTANYL CITRATE 100 MCG: 50 INJECTION, SOLUTION INTRAMUSCULAR; INTRAVENOUS at 08:03

## 2022-03-02 RX ADMIN — CEFAZOLIN 3 G: 330 INJECTION, POWDER, FOR SOLUTION INTRAMUSCULAR; INTRAVENOUS at 09:03

## 2022-03-02 RX ADMIN — DEXMEDETOMIDINE HYDROCHLORIDE 8 MCG: 100 INJECTION, SOLUTION INTRAVENOUS at 12:03

## 2022-03-02 RX ADMIN — HYDROMORPHONE HYDROCHLORIDE 0.4 MG: 2 INJECTION INTRAMUSCULAR; INTRAVENOUS; SUBCUTANEOUS at 12:03

## 2022-03-02 RX ADMIN — CEFTRIAXONE 2 G: 2 INJECTION, POWDER, FOR SOLUTION INTRAMUSCULAR; INTRAVENOUS at 03:03

## 2022-03-02 RX ADMIN — REMIFENTANIL HYDROCHLORIDE 0.05 MCG/KG/MIN: 1 INJECTION, POWDER, LYOPHILIZED, FOR SOLUTION INTRAVENOUS at 08:03

## 2022-03-02 RX ADMIN — ACETAMINOPHEN 1000 MG: 500 TABLET ORAL at 06:03

## 2022-03-02 RX ADMIN — SODIUM CHLORIDE: 0.9 INJECTION, SOLUTION INTRAVENOUS at 08:03

## 2022-03-02 RX ADMIN — KETAMINE HYDROCHLORIDE 10 MG: 100 INJECTION, SOLUTION, CONCENTRATE INTRAMUSCULAR; INTRAVENOUS at 12:03

## 2022-03-02 RX ADMIN — KETAMINE HYDROCHLORIDE 10 MG: 100 INJECTION, SOLUTION, CONCENTRATE INTRAMUSCULAR; INTRAVENOUS at 09:03

## 2022-03-02 RX ADMIN — PREGABALIN 75 MG: 75 CAPSULE ORAL at 08:03

## 2022-03-02 RX ADMIN — SODIUM CHLORIDE, SODIUM GLUCONATE, SODIUM ACETATE, POTASSIUM CHLORIDE, MAGNESIUM CHLORIDE, SODIUM PHOSPHATE, DIBASIC, AND POTASSIUM PHOSPHATE: .53; .5; .37; .037; .03; .012; .00082 INJECTION, SOLUTION INTRAVENOUS at 08:03

## 2022-03-02 RX ADMIN — ACETAMINOPHEN 1000 MG: 500 TABLET ORAL at 03:03

## 2022-03-02 RX ADMIN — SODIUM CHLORIDE 0.5 MCG/KG/MIN: 9 INJECTION, SOLUTION INTRAVENOUS at 08:03

## 2022-03-02 RX ADMIN — KETAMINE HYDROCHLORIDE 10 MG: 100 INJECTION, SOLUTION, CONCENTRATE INTRAMUSCULAR; INTRAVENOUS at 10:03

## 2022-03-02 RX ADMIN — TRANEXAMIC ACID 1000 MG: 100 INJECTION, SOLUTION INTRAVENOUS at 09:03

## 2022-03-02 RX ADMIN — PREGABALIN 75 MG: 75 CAPSULE ORAL at 05:03

## 2022-03-02 RX ADMIN — HYDROMORPHONE HYDROCHLORIDE 1 MG: 1 INJECTION, SOLUTION INTRAMUSCULAR; INTRAVENOUS; SUBCUTANEOUS at 10:03

## 2022-03-02 RX ADMIN — HYDROMORPHONE HYDROCHLORIDE 1 MG: 1 INJECTION, SOLUTION INTRAMUSCULAR; INTRAVENOUS; SUBCUTANEOUS at 06:03

## 2022-03-02 RX ADMIN — PROPOFOL 180 MG: 10 INJECTION, EMULSION INTRAVENOUS at 08:03

## 2022-03-02 RX ADMIN — ACETAMINOPHEN 1000 MG: 500 TABLET ORAL at 07:03

## 2022-03-02 RX ADMIN — LIDOCAINE HYDROCHLORIDE 100 MG: 20 INJECTION, SOLUTION INTRAVENOUS at 08:03

## 2022-03-02 RX ADMIN — Medication 200 MCG/KG/MIN: at 08:03

## 2022-03-02 RX ADMIN — Medication 6 MG: at 11:03

## 2022-03-02 RX ADMIN — ONDANSETRON 4 MG: 2 INJECTION INTRAMUSCULAR; INTRAVENOUS at 12:03

## 2022-03-02 RX ADMIN — LIDOCAINE 1 PATCH: 50 PATCH CUTANEOUS at 03:03

## 2022-03-02 RX ADMIN — DEXMEDETOMIDINE HYDROCHLORIDE 8 MCG: 100 INJECTION, SOLUTION INTRAVENOUS at 01:03

## 2022-03-02 RX ADMIN — KETAMINE HYDROCHLORIDE 30 MG: 100 INJECTION, SOLUTION, CONCENTRATE INTRAMUSCULAR; INTRAVENOUS at 08:03

## 2022-03-02 RX ADMIN — OXYCODONE HYDROCHLORIDE 10 MG: 10 TABLET ORAL at 11:03

## 2022-03-02 RX ADMIN — HYDROMORPHONE HYDROCHLORIDE 1 MG: 1 INJECTION, SOLUTION INTRAMUSCULAR; INTRAVENOUS; SUBCUTANEOUS at 01:03

## 2022-03-02 RX ADMIN — OXYCODONE HYDROCHLORIDE 15 MG: 10 TABLET ORAL at 03:03

## 2022-03-02 RX ADMIN — PHENYLEPHRINE HYDROCHLORIDE 150 MCG: 10 INJECTION INTRAVENOUS at 08:03

## 2022-03-02 RX ADMIN — Medication 10 ML: at 03:03

## 2022-03-02 RX ADMIN — SUCCINYLCHOLINE CHLORIDE 160 MG: 20 INJECTION, SOLUTION INTRAMUSCULAR; INTRAVENOUS at 08:03

## 2022-03-02 RX ADMIN — Medication 10 ML: at 02:03

## 2022-03-02 RX ADMIN — DEXAMETHASONE SODIUM PHOSPHATE 4 MG: 4 INJECTION INTRA-ARTICULAR; INTRALESIONAL; INTRAMUSCULAR; INTRAVENOUS; SOFT TISSUE at 09:03

## 2022-03-02 NOTE — ANESTHESIA PROCEDURE NOTES
Intubation    Date/Time: 3/2/2022 8:22 AM  Performed by: Brianda Sinclair MD  Authorized by: Yeimy Briggs MD     Intubation:     Induction:  Intravenous    Intubated:  Postinduction    Mask Ventilation:  Easy mask    Attempts:  1    Attempted By:  Resident anesthesiologist    Method of Intubation:  Direct    Blade:  Moore 2    Laryngeal View Grade: Grade I - full view of cords      Difficult Airway Encountered?: No      Complications:  None    Airway Device:  Oral endotracheal tube    Airway Device Size:  7.5    Style/Cuff Inflation:  Cuffed (inflated to minimal occlusive pressure)    Inflation Amount (mL):  8    Tube secured:  22    Secured at:  The lips    Placement Verified By:  Capnometry    Complicating Factors:  None    Findings Post-Intubation:  BS equal bilateral and atraumatic/condition of teeth unchanged

## 2022-03-02 NOTE — PROGRESS NOTES
[facility] - Neuro Critical Care  Neurocritical Care  Progress Note    Admit Date: 2/14/2022  Service Date: 03/02/2022  Length of Stay: 16    Subjective:     Chief Complaint: Spondylodiscitis    History of Present Illness: Ms. Zazueta is a 42 yo F PMH cirrhosis, Hep C, pancytopenia, polysubstance abuse, epidural abscess s/p L3-L4 laminectomy and L3-L5 TLIF (4/2021; blood and surgical cultures positive for group B strep) who is admitted to the neuro ICU after hardware removal and washout. Per chart review patient had worsening back and left leg pain since October. The left leg pain is a shocking pain if she stands for 10 minutes. Also reports new weakness in the legs. Denies bowel/bladder dsyfunction or neck pain. Uses a walker to ambulate at home. Endorses cigarette and marijuana use denies Iv drug use for 2 years. Patient was on vanc and ceftriaxone prior to the surgery. She required platelets prior to the procedure, PRBCs intraoperatively and estimated blood loss 1L. On arrival to the neuro ICU she was hypotensive requiring levo and in pain.    Patient was s/d on 2/22/22 to NSGY floor and she was doing well, team managing pain and per notes neuro exam with 4/5 strength LLE and rest normal. She went to OR today for T10-pelvis fusion and L5-S1 TLIF, no complications. Had a 500cc blood loss requiring transfusion and dural tare. Patient arrived to [facility] c/o pain, but moving all 4 extremities, HD stable. Two drains in place.  Admitting for closer monitoring.       Hospital Course: CT lumbar spine suspicious for hardware infection. Patient started on vanc and ceftriaxone. Hardware removal and washout with neurosurgery 2/22. Estimated 1L blood loss intraoperatively and required 1 unit PRBCs intraoperatively. Was on levo for Pain control with IV dilaudid and PO oxycodone. Awaiting post op xray imaging        Interval History:  Patient readmitted to [facility] now s/p T10-pelvis fusion and L5-S1 TLIF with no complications. 500cc  blood loss, got 1UPRBC. Had a dural tare, optimizing pain regimen, laying flat. Optimizing pain regimen. SBP goal <180. Hgb 8.6. Patient still sedated on arrival and c/o pain, but moving all 4 extremities spontaneously and turning on bed     Review of Systems   Constitutional:  Negative for fatigue.   Gastrointestinal:  Negative for diarrhea and vomiting.   Musculoskeletal:  Positive for gait problem.   Skin:  Positive for wound.   Unable to obtain a complete ROS due to level of consciousness.  Objective:     Vitals:  Temp: 97.6 °F (36.4 °C)  Pulse: 85  Rhythm: normal sinus rhythm  BP: 131/67  MAP (mmHg): 92  Resp: 17  SpO2: 95 %  O2 Device (Oxygen Therapy): room air    Temp  Min: 97.4 °F (36.3 °C)  Max: 98.7 °F (37.1 °C)  Pulse  Min: 78  Max: 96  BP  Min: 101/59  Max: 136/76  MAP (mmHg)  Min: 79  Max: 101  Resp  Min: 11  Max: 20  SpO2  Min: 94 %  Max: 100 %    03/01 0701 - 03/02 0700  In: -   Out: 500 [Urine:400; Drains:100]   Unmeasured Output  Urine Occurrence: 1  Stool Occurrence: 1  Pad Count: 1       Physical Exam  Vitals and nursing note reviewed.   Constitutional:       Appearance: She is obese.   HENT:      Head: Normocephalic.      Nose: Nose normal.      Mouth/Throat:      Mouth: Mucous membranes are moist.   Eyes:      Extraocular Movements: Extraocular movements intact.      Pupils: Pupils are equal, round, and reactive to light.   Cardiovascular:      Rate and Rhythm: Normal rate.   Pulmonary:      Effort: No respiratory distress.   Abdominal:      General: There is distension.      Tenderness: There is no abdominal tenderness.   Musculoskeletal:         General: Normal range of motion.   Neurological:      Mental Status: She is alert.      GCS: GCS eye subscore is 4. GCS verbal subscore is 4. GCS motor subscore is 6.      Cranial Nerves: No cranial nerve deficit.      Sensory: No sensory deficit.      Motor: Weakness present.      Comments: Moving all 4 extremities spontaneously   W/D to pain to bl  lower extremities. No saddle anesthesia noted            Medications:  Continuoussodium chloride 0.9%, Last Rate: 10 mL/hr at 02/27/22 0305    Scheduledacetaminophen, 1,000 mg, Q8H  cefTRIAXone (ROCEPHIN) IVPB, 2 g, Q12H  LIDOcaine, 1 patch, Q24H  pantoprazole, 40 mg, Daily  pregabalin, 75 mg, TID  senna-docusate 8.6-50 mg, 1 tablet, Daily  sertraline, 50 mg, Daily  sodium chloride 0.9%, 10 mL, Q6H    PRNsodium chloride, , Q24H PRN  aluminum-magnesium hydroxide-simethicone, 30 mL, Q6H PRN  dextrose 10%, 12.5 g, PRN  dextrose 10%, 25 g, PRN  glucagon (human recombinant), 1 mg, PRN  glucose, 16 g, PRN  glucose, 24 g, PRN  HYDROmorphone, 1 mg, Q4H PRN  melatonin, 6 mg, Nightly PRN  oxyCODONE, 15 mg, Q4H PRN  oxyCODONE, 10 mg, Q4H PRN  polyethylene glycol, 17 g, Daily PRN  sodium chloride 0.9%, 10 mL, PRN      Today I personally reviewed pertinent medications, lines/drains/airways, imaging, cardiology results, laboratory results, microbiology results, notably:    Diet  Diet NPO  Diet NPO          Assessment/Plan:     Psychiatric  Mild episode of recurrent major depressive disorder  Continue home sertraline 50mg daily    ID  * Spondylodiscitis  Patient initially sent to the hospital from nsgy clinic for infected hardware.  Of note history of IVDU and TLIF of L3-L5 on 04/16/2021 for previous spinal abscess. This hospital admission MRI C/T/L spine showed suspected discitis/osteomyelitis at L3-4 and L4-5 with probable hardware infection initially admitted on 2/21 s/p washout and hardware removal with neurosurgery now re admitted s/p T10-pelvis fusion and L5-S1 TLIF     -- Continue ceftriaxone  -- Estimated 500L blood loss intraop, required 1L PRBCs intraoperatively  -- Monitor drain output, 2 in place open to gravity   -- ID following, will likely need 8 weeks of Abx   -- Pending CT spine   -- SBP <180 , MAP >65   -- Pain regimen: pregabalin increased to 75mg TID, added lidocaine patch, oxycodone 10/15 PRN (first choice)  and dilaudid if needed         Hematology  Thrombocytopenia  2/2 liver cirrhosis   -- see pancytopenia     Oncology  Pancytopenia  Hx of bone marrow biopsy 2017 that was nondiagnostic.      Recommendations:  - daily labs  - transfuse for Hgb <7  - transfuse for plt <10k or <50k with bleeding    GI  Chronic hepatitis C with cirrhosis  U/S abdomen with doppler showed cirrhosis and portal hypertension satisfactory Doppler evaluation   MELD-Na score: 9 at 3/2/2022  2:23 PM  MELD score: 9 at 3/2/2022  2:23 PM  Calculated from:  Serum Creatinine: 0.6 mg/dL (Using min of 1 mg/dL) at 3/2/2022  2:23 PM  Serum Sodium: 137 mmol/L at 3/2/2022  2:23 PM  Total Bilirubin: 0.7 mg/dL (Using min of 1 mg/dL) at 3/2/2022  4:24 AM  INR(ratio): 1.3 at 3/2/2022  4:24 AM  Age: 41 years    --daily CMP and INR  --will need hepatology referral at time of discharge  --Lipase normal    Other  Hyperbilirubinemia  Tbili 2.1 on admission.      Recommendations:  daily CMP, trend Tbili   Hepatology workup outpatient          The patient is being Prophylaxed for:  Venous Thromboembolism with: None  Stress Ulcer with: PPI  Ventilator Pneumonia with: not applicable    Activity Orders          Turn patient every 2 hours starting at 03/02 1600    Diet NPO: NPO starting at 03/02 0001    Progressive Mobility Protocol (mobilize patient to their highest level of functioning at least twice daily) starting at 02/14 2000        Full Code    Carmel Mackenzie MD  Neurocritical Care  [facility] - Neuro Critical Care

## 2022-03-02 NOTE — PLAN OF CARE
"40 y/o F with PMH of cirrhosis, Hep C, pancytopenia, polysubstance abuse, and epidural abscess s/p L3-L4 laminectomy and L3-L5 TLIF (4/2021; blood and surgical cultures positive for group B strep) admitted from [facility] with progressive lumbar spondylodiscitis and hardware failure. IM6 consulted for preop risk stratification and medical optimization.      Pt looks well, A&Ox4. Reports some left leg cramp is better today. Chart reviewed. S/p washout and hardware removal 2/21. Plan for reinstrumentation on 3/2. She may need platelet and PRBCs transfusion before the surgery tomorrow.      MELD-Na score: 9 at 3/2/2022  2:23 PM  MELD score: 9 at 3/2/2022  2:23 PM  Calculated from:  Serum Creatinine: 0.6 mg/dL (Using min of 1 mg/dL) at 3/2/2022  2:23 PM  Serum Sodium: 137 mmol/L at 3/2/2022  2:23 PM  Total Bilirubin: 0.7 mg/dL (Using min of 1 mg/dL) at 3/2/2022  4:24 AM  INR(ratio): 1.3 at 3/2/2022  4:24 AM  Age: 41 years     - RCRI 0   - blood and biopsy cultures negative so far  - surgical cultures negative so far   - continue rocephin per ID recs, PICC placed   - History of leukopenia, today WBC are 1.93 with ANC of 1300. Continue to trend CBC.  - daily CMP, INR   - will need outpatient hepatology referral    - recommend placement on discharge for continued IV abx given hx of drug use and noncompliance with previous IV abx (per ID will likely need 8 weeks of antibiotics from day of surgery)     Please secure [facility]  ("Medicine Consult Only") or contact me directly for any additional questions or concerns.     Simi Argueta MD  Internal Medicine, PGYIII  "

## 2022-03-02 NOTE — PLAN OF CARE
Problem: Adult Inpatient Plan of Care  Goal: Plan of Care Review  Outcome: Ongoing, Progressing  Goal: Absence of Hospital-Acquired Illness or Injury  Outcome: Ongoing, Progressing  Goal: Optimal Comfort and Wellbeing  Outcome: Ongoing, Progressing     Problem: Fall Injury Risk  Goal: Absence of Fall and Fall-Related Injury  Outcome: Ongoing, Progressing     Problem: Skin Injury Risk Increased  Goal: Skin Health and Integrity  Outcome: Ongoing, Progressing     Problem: Infection  Goal: Absence of Infection Signs and Symptoms  Outcome: Ongoing, Progressing   Patient lying in bed with family at bedside, No distress noted, VSS, pt complain of 8-9/10 pain throughout shift, Medicated per MARS. Pt on r/a, respiratory status intact. Incision noted from mid upper back to lower buttocks, staples in place CDI, no redness or edema noted. Hemovac x2 in place left/right back right side no output,left side 100ml output. Neuro check in place WNL unchange throughout the night, Pt prep for surgery in am, with the exception of platelets and prbc due to be start infusing at 5am, consents in place, bath given, educated pt to importance of using the call light with any wants or needs, Updated pt to POC,pt verbalize understanding will continue to monitor.

## 2022-03-02 NOTE — ASSESSMENT & PLAN NOTE
U/S abdomen with doppler showed cirrhosis and portal hypertension satisfactory Doppler evaluation   MELD-Na score: 9 at 3/2/2022  2:23 PM  MELD score: 9 at 3/2/2022  2:23 PM  Calculated from:  Serum Creatinine: 0.6 mg/dL (Using min of 1 mg/dL) at 3/2/2022  2:23 PM  Serum Sodium: 137 mmol/L at 3/2/2022  2:23 PM  Total Bilirubin: 0.7 mg/dL (Using min of 1 mg/dL) at 3/2/2022  4:24 AM  INR(ratio): 1.3 at 3/2/2022  4:24 AM  Age: 41 years    --daily CMP and INR  --will need hepatology referral at time of discharge  --Lipase normal

## 2022-03-02 NOTE — ANESTHESIA POSTPROCEDURE EVALUATION
Anesthesia Post Evaluation    Patient: Rachel Zazueta    Procedure(s) Performed: Procedure(s) (LRB):  FUSION, SPINE, LUMBAR (Bilateral)    Final Anesthesia Type: general      Patient location during evaluation: ICU  Patient participation: Yes- Able to Participate  Level of consciousness: awake  Post-procedure vital signs: reviewed and stable  Pain management: adequate  Airway patency: patent    PONV status at discharge: No PONV  Anesthetic complications: no      Cardiovascular status: hemodynamically stable  Respiratory status: spontaneous ventilation  Follow-up not needed.          Vitals Value Taken Time   /73 03/02/22 1401   Temp 98.0 03/02/22 1411   Pulse 82 03/02/22 1410   Resp 15 03/02/22 1410   SpO2 100 % 03/02/22 1410   Vitals shown include unvalidated device data.      No case tracking events are documented in the log.      Pain/Chen Score: Pain Rating Prior to Med Admin: 10 (3/2/2022  1:58 PM)  Pain Rating Post Med Admin: 0 (3/1/2022 11:13 PM)

## 2022-03-02 NOTE — ASSESSMENT & PLAN NOTE
Hx of bone marrow biopsy 2017 that was nondiagnostic.      Recommendations:  - daily labs  - transfuse for Hgb <7  - transfuse for plt <10k or <50k with bleeding

## 2022-03-02 NOTE — PT/OT/SLP DISCHARGE
Physical Therapy Discharge Summary    Name: Rachel Zazueta  MRN: [id number]   Principal Problem: Spondylodiscitis     Patient Discharged from acute Physical Therapy on 3/2/2022.     Assessment:     Pt to OR for lumbar spine fusion.    Objective:     GOALS:   Multidisciplinary Problems     Physical Therapy Goals        Problem: Physical Therapy Goal    Goal Priority Disciplines Outcome Goal Variances Interventions   Physical Therapy Goal     PT, PT/OT Ongoing, Progressing     Description: Goals to be met by: 3/8/2022     Patient will increase functional independence with mobility by performin. Supine to sit with MInimal Assistance  2. Sit to supine with MInimal Assistance  3. Rolling to Left and Right with Minimal Assistance.  4. Sit to stand transfer with Stand-by Assistance and RW  5. Bed to chair transfer with Minimal Assistance using Rolling Walker

## 2022-03-02 NOTE — PLAN OF CARE
[facility] Care Plan    POC reviewed with Rachel Zazueta and family at 1500. Pt verbalized understanding. Questions and concerns addressed. No acute events today. Pt progressing toward goals. Will continue to monitor. See below and flowsheets for full assessment and VS info.     Neuro:  [place] Coma Scale  Best Eye Response: 4-->(E4) spontaneous  Best Motor Response: 6-->(M6) obeys commands  Best Verbal Response: 5-->(V5) oriented  [place] Coma Scale Score: 15  Assessment Qualifiers: patient not sedated/intubated  Pupil PERRLA: yes     24 hr Temp:  [96.9 °F (36.1 °C)-98.7 °F (37.1 °C)]     CV:   Rhythm: normal sinus rhythm  BP goals:   SBP < 140  MAP > 65    Resp:   O2 Device (Oxygen Therapy): room air       Plan: N/A    GI/:     Diet/Nutrition Received: NPO  Last Bowel Movement: 03/01/22  Voiding Characteristics: ureteral catheter    Intake/Output Summary (Last 24 hours) at 3/2/2022 1726  Last data filed at 3/2/2022 1300  Gross per 24 hour   Intake 2829.17 ml   Output 1400 ml   Net 1429.17 ml     Unmeasured Output  Urine Occurrence: 1  Stool Occurrence: 1  Pad Count: 1    Labs/Accuchecks:  Recent Labs   Lab 03/02/22  1423   WBC 4.60   RBC 2.90*   HGB 8.6*   HCT 27.2*   PLT 92*      Recent Labs   Lab 03/02/22  0424 03/02/22  1423    137   K 3.7 4.1   CO2 26 26    104   BUN 16 15   CREATININE 0.6 0.6   ALKPHOS 98  --    ALT 24  --    AST 59*  --    BILITOT 0.7  --       Recent Labs   Lab 03/02/22  0424   INR 1.3*    No results for input(s): CPK, CPKMB, TROPONINI, MB in the last 168 hours.    Electrolytes: N/A - electrolytes WDL  Accuchecks: none    Gtts:   sodium chloride 0.9% 10 mL/hr at 02/27/22 0305       LDA/Wounds:  Lines/Drains/Airways       Peripherally Inserted Central Catheter Line  Duration             PICC Double Lumen 02/22/22 1110 right basilic 8 days              Central Venous Catheter Line  Duration             Percutaneous Central Line Insertion/Assessment - Double Lumen  03/02/22 1006  right internal jugular <1 day              Drain  Duration                  Closed/Suction Drain 03/02/22 1208 Left;Superior Back Accordion 10 Fr. <1 day         Closed/Suction Drain 03/02/22 1208 Right;Superior Back Accordion 10 Fr. <1 day         Urethral Catheter 03/02/22 0910 Straight-tip;Silicone 16 Fr. <1 day              Arterial Line  Duration             Arterial Line 03/02/22 0828 Left Radial <1 day              Peripheral Intravenous Line  Duration                  Peripheral IV - Single Lumen 02/21/22 1824 20 G Anterior;Left;Proximal Forearm 8 days                  Wounds: Yes  Wound care consulted: No

## 2022-03-02 NOTE — SUBJECTIVE & OBJECTIVE
Interval History:  Patient readmitted to [facility] now s/p T10-pelvis fusion and L5-S1 TLIF with no complications. 500cc blood loss, got 1UPRBC. Had a dural tare, optimizing pain regimen, laying flat. Optimizing pain regimen. SBP goal <180. Hgb 8.6. Patient still sedated on arrival and c/o pain, but moving all 4 extremities spontaneously and turning on bed     Review of Systems   Constitutional:  Negative for fatigue.   Gastrointestinal:  Negative for diarrhea and vomiting.   Musculoskeletal:  Positive for gait problem.   Skin:  Positive for wound.   Unable to obtain a complete ROS due to level of consciousness.  Objective:     Vitals:  Temp: 97.6 °F (36.4 °C)  Pulse: 85  Rhythm: normal sinus rhythm  BP: 131/67  MAP (mmHg): 92  Resp: 17  SpO2: 95 %  O2 Device (Oxygen Therapy): room air    Temp  Min: 97.4 °F (36.3 °C)  Max: 98.7 °F (37.1 °C)  Pulse  Min: 78  Max: 96  BP  Min: 101/59  Max: 136/76  MAP (mmHg)  Min: 79  Max: 101  Resp  Min: 11  Max: 20  SpO2  Min: 94 %  Max: 100 %    03/01 0701 - 03/02 0700  In: -   Out: 500 [Urine:400; Drains:100]   Unmeasured Output  Urine Occurrence: 1  Stool Occurrence: 1  Pad Count: 1       Physical Exam  Vitals and nursing note reviewed.   Constitutional:       Appearance: She is obese.   HENT:      Head: Normocephalic.      Nose: Nose normal.      Mouth/Throat:      Mouth: Mucous membranes are moist.   Eyes:      Extraocular Movements: Extraocular movements intact.      Pupils: Pupils are equal, round, and reactive to light.   Cardiovascular:      Rate and Rhythm: Normal rate.   Pulmonary:      Effort: No respiratory distress.   Abdominal:      General: There is distension.      Tenderness: There is no abdominal tenderness.   Musculoskeletal:         General: Normal range of motion.   Neurological:      Mental Status: She is alert.      GCS: GCS eye subscore is 4. GCS verbal subscore is 4. GCS motor subscore is 6.      Cranial Nerves: No cranial nerve deficit.      Sensory: No sensory  deficit.      Motor: Weakness present.      Comments: Moving all 4 extremities spontaneously   W/D to pain to bl lower extremities. No saddle anesthesia noted            Medications:  Continuoussodium chloride 0.9%, Last Rate: 10 mL/hr at 02/27/22 0305    Scheduledacetaminophen, 1,000 mg, Q8H  cefTRIAXone (ROCEPHIN) IVPB, 2 g, Q12H  LIDOcaine, 1 patch, Q24H  pantoprazole, 40 mg, Daily  pregabalin, 75 mg, TID  senna-docusate 8.6-50 mg, 1 tablet, Daily  sertraline, 50 mg, Daily  sodium chloride 0.9%, 10 mL, Q6H    PRNsodium chloride, , Q24H PRN  aluminum-magnesium hydroxide-simethicone, 30 mL, Q6H PRN  dextrose 10%, 12.5 g, PRN  dextrose 10%, 25 g, PRN  glucagon (human recombinant), 1 mg, PRN  glucose, 16 g, PRN  glucose, 24 g, PRN  HYDROmorphone, 1 mg, Q4H PRN  melatonin, 6 mg, Nightly PRN  oxyCODONE, 15 mg, Q4H PRN  oxyCODONE, 10 mg, Q4H PRN  polyethylene glycol, 17 g, Daily PRN  sodium chloride 0.9%, 10 mL, PRN      Today I personally reviewed pertinent medications, lines/drains/airways, imaging, cardiology results, laboratory results, microbiology results, notably:    Diet  Diet NPO  Diet NPO

## 2022-03-02 NOTE — ASSESSMENT & PLAN NOTE
Patient initially sent to the hospital from nsgy clinic for infected hardware.  Of note history of IVDU and TLIF of L3-L5 on 04/16/2021 for previous spinal abscess. This hospital admission MRI C/T/L spine showed suspected discitis/osteomyelitis at L3-4 and L4-5 with probable hardware infection initially admitted on 2/21 s/p washout and hardware removal with neurosurgery now re admitted s/p T10-pelvis fusion and L5-S1 TLIF     -- Continue ceftriaxone  -- Estimated 500L blood loss intraop, required 1L PRBCs intraoperatively  -- Monitor drain output, 2 in place open to gravity   -- ID following, will likely need 8 weeks of Abx   -- Pending CT spine   -- SBP <180 , MAP >65   -- Pain regimen: pregabalin increased to 75mg TID, added lidocaine patch, oxycodone 10/15 PRN (first choice) and dilaudid if needed

## 2022-03-02 NOTE — ANESTHESIA PROCEDURE NOTES
Arterial    Diagnosis: spondylodiscitis    Patient location during procedure: done in OR  Procedure start time: 3/2/2022 8:28 AM  Timeout: 3/2/2022 8:27 AM  Procedure end time: 3/2/2022 8:33 AM    Staffing  Authorizing Provider: Yeimy Briggs MD  Performing Provider: Brianda Sinclair MD    Anesthesiologist was present at the time of the procedure.    Preanesthetic Checklist  Completed: patient identified, IV checked, site marked, risks and benefits discussed, surgical consent, monitors and equipment checked, pre-op evaluation, timeout performed and anesthesia consent givenArterial  Skin Prep: chlorhexidine gluconate  Local Infiltration: none  Orientation: left  Location: radial    Catheter Size: 20 G  Catheter placement by Ultrasound guidance. Heme positive aspiration all ports.   Vessel Caliber: small, medium, patent, compressibility normal  Vascular Doppler:  not done  Needle advanced into vessel with real time Ultrasound guidance.Insertion Attempts: 1  Assessment  Dressing: secured with tape and tegaderm  Patient: Tolerated well

## 2022-03-02 NOTE — NURSING
Patient lying in bed with family at bedside, No distress noted, VSS, pt complain of 8-9/10 pain throughout shift, Medicated per MARS. Pt on r/a, respiratory status intact. Incision noted from mid upper back to lower buttocks, staples in place CDI, no redness or edema noted. Hemovac x2 in place left/right back right side no output,left side 100ml output. Neuro check in place WNL unchange throughout the night, Pt prep for surgery in am, with the exception of platelets and prbc due to be start infusing at 5am, consents in place, bath given, educated pt to importance of using the call light with any wants or needs, Updated pt to POC,pt verbalize understanding will continue to monitor.

## 2022-03-02 NOTE — BRIEF OP NOTE
[facility] - Surgery ([facility])  Brief Operative Note    SUMMARY     Surgery Date: 3/2/2022     Surgeon(s) and Role:     * Guille Templeton MD - Primary     * Ad Black MD - Resident - Assisting     * Mario Alberto Camilo MD - Co-Surgeon        Pre-op Diagnosis:  Spondylodiscitis [M46.40]    Post-op Diagnosis:  Post-Op Diagnosis Codes:     * Spondylodiscitis [M46.40]    Procedure(s) (LRB):  FUSION, SPINE, LUMBAR (Bilateral)    Anesthesia: General    Operative Findings: T10-Pelvis fusion, L5-S1 TLIF, wound washout    Estimated Blood Loss: 500 mL    Estimated Blood Loss has been documented.         Specimens:   Specimen (24h ago, onward)            None          [id number]

## 2022-03-02 NOTE — ANESTHESIA PROCEDURE NOTES
Central Line    Diagnosis: spondylodiscitis  Patient location during procedure: done in OR  Timeout: 3/2/2022 8:35 AM  Procedure end time: 3/2/2022 8:45 AM    Staffing  Authorizing Provider: Yeimy Briggs MD  Performing Provider: Brianda Sinclair MD    Anesthesiologist was present at the time of the procedure.  Preanesthetic Checklist  Completed: patient identified, IV checked, site marked, risks and benefits discussed, surgical consent, monitors and equipment checked, pre-op evaluation, timeout performed and anesthesia consent given  Indication   Indication: vascular access, med administration     Anesthesia   general anesthesia    Central Line   Skin Prep: skin prepped with ChloraPrep, skin prep agent completely dried prior to procedure  Sterile Barriers Followed: Yes    All five maximal barriers used- gloves, gown, cap, mask, and large sterile sheet    hand hygiene performed prior to central venous catheter insertion  Location: right internal jugular.   Catheter type: double lumen  Catheter Size: 9 Fr  Ultrasound: vascular probe with ultrasound   Vessel Caliber: large, patent  Vascular Doppler:  not done, compressibility normal  Needle advanced into vessel with real time Ultrasound guidance.  Guidewire confirmed in vessel.  sterile gel and probe cover used in ultrasound-guided central venous catheter insertion   Manometry: Venous cannualation confirmed by visual estimation of blood vessel pressure using manometry.  Insertion Attempts: 1   Securement:line sutured, chlorhexidine patch, sterile dressing applied and blood return through all ports    Post-Procedure    Adverse Events:none      Guidewire Guidewire removed intact.

## 2022-03-02 NOTE — TRANSFER OF CARE
"Anesthesia Transfer of Care Note    Patient: Rachel Zazueta    Procedure(s) Performed: Procedure(s) (LRB):  FUSION, SPINE, LUMBAR (Bilateral)    Patient location: ICU    Anesthesia Type: general    Transport from OR: Transported from OR on 6-10 L/min O2 by face mask with adequate spontaneous ventilation    Post pain: adequate analgesia    Post assessment: no apparent anesthetic complications and tolerated procedure well    Post vital signs: stable    Level of consciousness: awake and responds to stimulation    Nausea/Vomiting: no nausea/vomiting    Complications: none    Transfer of care protocol was followed      Last vitals:   Visit Vitals  /63 (BP Location: Left arm, Patient Position: Lying)   Pulse 90   Temp 36.8 °C (98.2 °F) (Temporal)   Resp 18   Ht 5' 7" (1.702 m)   Wt 133.8 kg (294 lb 15.6 oz)   LMP  (LMP Unknown)   SpO2 100%   Breastfeeding No   BMI 46.20 kg/m²     "

## 2022-03-02 NOTE — PROGRESS NOTES
PreOp complete. Resident paged because the consent needs to state the level of spine. Blood transfusion complete. PiCC line flushed and patent. Patient's , Minor, set up for text messaging.

## 2022-03-03 LAB
ALBUMIN SERPL BCP-MCNC: 2 G/DL (ref 3.5–5.2)
ALP SERPL-CCNC: 89 U/L (ref 55–135)
ALT SERPL W/O P-5'-P-CCNC: 26 U/L (ref 10–44)
ANION GAP SERPL CALC-SCNC: 6 MMOL/L (ref 8–16)
AST SERPL-CCNC: 58 U/L (ref 10–40)
BASOPHILS # BLD AUTO: 0 K/UL (ref 0–0.2)
BASOPHILS # BLD AUTO: 0.01 K/UL (ref 0–0.2)
BASOPHILS # BLD AUTO: 0.03 K/UL (ref 0–0.2)
BASOPHILS NFR BLD: 0 % (ref 0–1.9)
BASOPHILS NFR BLD: 0.2 % (ref 0–1.9)
BASOPHILS NFR BLD: 0.7 % (ref 0–1.9)
BILIRUB SERPL-MCNC: 1.5 MG/DL (ref 0.1–1)
BLD PROD TYP BPU: NORMAL
BLOOD UNIT EXPIRATION DATE: NORMAL
BLOOD UNIT TYPE CODE: 6200
BLOOD UNIT TYPE: NORMAL
BUN SERPL-MCNC: 16 MG/DL (ref 6–20)
CALCIUM SERPL-MCNC: 7.8 MG/DL (ref 8.7–10.5)
CHLORIDE SERPL-SCNC: 107 MMOL/L (ref 95–110)
CO2 SERPL-SCNC: 24 MMOL/L (ref 23–29)
CODING SYSTEM: NORMAL
CREAT SERPL-MCNC: 0.5 MG/DL (ref 0.5–1.4)
DIFFERENTIAL METHOD: ABNORMAL
DISPENSE STATUS: NORMAL
EOSINOPHIL # BLD AUTO: 0 K/UL (ref 0–0.5)
EOSINOPHIL # BLD AUTO: 0.1 K/UL (ref 0–0.5)
EOSINOPHIL # BLD AUTO: 0.2 K/UL (ref 0–0.5)
EOSINOPHIL NFR BLD: 0 % (ref 0–8)
EOSINOPHIL NFR BLD: 1.7 % (ref 0–8)
EOSINOPHIL NFR BLD: 3.3 % (ref 0–8)
ERYTHROCYTE [DISTWIDTH] IN BLOOD BY AUTOMATED COUNT: 16.4 % (ref 11.5–14.5)
ERYTHROCYTE [DISTWIDTH] IN BLOOD BY AUTOMATED COUNT: 16.5 % (ref 11.5–14.5)
ERYTHROCYTE [DISTWIDTH] IN BLOOD BY AUTOMATED COUNT: 16.8 % (ref 11.5–14.5)
EST. GFR  (AFRICAN AMERICAN): >60 ML/MIN/1.73 M^2
EST. GFR  (NON AFRICAN AMERICAN): >60 ML/MIN/1.73 M^2
GLUCOSE SERPL-MCNC: 110 MG/DL (ref 70–110)
GLUCOSE SERPL-MCNC: 115 MG/DL (ref 70–110)
GLUCOSE SERPL-MCNC: 83 MG/DL (ref 70–110)
GLUCOSE SERPL-MCNC: 91 MG/DL (ref 70–110)
HCO3 UR-SCNC: 23.2 MMOL/L (ref 24–28)
HCO3 UR-SCNC: 26.8 MMOL/L (ref 24–28)
HCO3 UR-SCNC: 29.2 MMOL/L (ref 24–28)
HCT VFR BLD AUTO: 21.4 % (ref 37–48.5)
HCT VFR BLD AUTO: 24 % (ref 37–48.5)
HCT VFR BLD AUTO: 25.2 % (ref 37–48.5)
HCT VFR BLD CALC: 20 %PCV (ref 36–54)
HCT VFR BLD CALC: 24 %PCV (ref 36–54)
HCT VFR BLD CALC: 24 %PCV (ref 36–54)
HGB BLD-MCNC: 6.6 G/DL (ref 12–16)
HGB BLD-MCNC: 7.8 G/DL (ref 12–16)
HGB BLD-MCNC: 8.2 G/DL (ref 12–16)
IMM GRANULOCYTES # BLD AUTO: 0.02 K/UL (ref 0–0.04)
IMM GRANULOCYTES # BLD AUTO: 0.03 K/UL (ref 0–0.04)
IMM GRANULOCYTES # BLD AUTO: 0.03 K/UL (ref 0–0.04)
IMM GRANULOCYTES NFR BLD AUTO: 0.6 % (ref 0–0.5)
IMM GRANULOCYTES NFR BLD AUTO: 0.7 % (ref 0–0.5)
IMM GRANULOCYTES NFR BLD AUTO: 0.7 % (ref 0–0.5)
INR PPP: 1.3 (ref 0.8–1.2)
LYMPHOCYTES # BLD AUTO: 0.3 K/UL (ref 1–4.8)
LYMPHOCYTES # BLD AUTO: 0.5 K/UL (ref 1–4.8)
LYMPHOCYTES # BLD AUTO: 0.6 K/UL (ref 1–4.8)
LYMPHOCYTES NFR BLD: 10.2 % (ref 18–48)
LYMPHOCYTES NFR BLD: 11 % (ref 18–48)
LYMPHOCYTES NFR BLD: 12.4 % (ref 18–48)
MAGNESIUM SERPL-MCNC: 1.6 MG/DL (ref 1.6–2.6)
MCH RBC QN AUTO: 29.3 PG (ref 27–31)
MCH RBC QN AUTO: 29.4 PG (ref 27–31)
MCH RBC QN AUTO: 30.3 PG (ref 27–31)
MCHC RBC AUTO-ENTMCNC: 30.8 G/DL (ref 32–36)
MCHC RBC AUTO-ENTMCNC: 32.5 G/DL (ref 32–36)
MCHC RBC AUTO-ENTMCNC: 32.5 G/DL (ref 32–36)
MCV RBC AUTO: 91 FL (ref 82–98)
MCV RBC AUTO: 93 FL (ref 82–98)
MCV RBC AUTO: 95 FL (ref 82–98)
MONOCYTES # BLD AUTO: 0.2 K/UL (ref 0.3–1)
MONOCYTES # BLD AUTO: 0.4 K/UL (ref 0.3–1)
MONOCYTES # BLD AUTO: 0.5 K/UL (ref 0.3–1)
MONOCYTES NFR BLD: 10.2 % (ref 4–15)
MONOCYTES NFR BLD: 8.2 % (ref 4–15)
MONOCYTES NFR BLD: 8.5 % (ref 4–15)
NEUTROPHILS # BLD AUTO: 2.3 K/UL (ref 1.8–7.7)
NEUTROPHILS # BLD AUTO: 3.4 K/UL (ref 1.8–7.7)
NEUTROPHILS # BLD AUTO: 3.7 K/UL (ref 1.8–7.7)
NEUTROPHILS NFR BLD: 72.7 % (ref 38–73)
NEUTROPHILS NFR BLD: 78.8 % (ref 38–73)
NEUTROPHILS NFR BLD: 80.1 % (ref 38–73)
NRBC BLD-RTO: 0 /100 WBC
PCO2 BLDA: 31.3 MMHG (ref 35–45)
PCO2 BLDA: 37.8 MMHG (ref 35–45)
PCO2 BLDA: 39.5 MMHG (ref 35–45)
PH SMN: 7.46 [PH] (ref 7.35–7.45)
PH SMN: 7.48 [PH] (ref 7.35–7.45)
PH SMN: 7.48 [PH] (ref 7.35–7.45)
PHOSPHATE SERPL-MCNC: 2.9 MG/DL (ref 2.7–4.5)
PLATELET # BLD AUTO: 69 K/UL (ref 150–450)
PLATELET # BLD AUTO: 79 K/UL (ref 150–450)
PLATELET # BLD AUTO: 81 K/UL (ref 150–450)
PMV BLD AUTO: 8.7 FL (ref 9.2–12.9)
PMV BLD AUTO: 9.1 FL (ref 9.2–12.9)
PMV BLD AUTO: 9.7 FL (ref 9.2–12.9)
PO2 BLDA: 200 MMHG (ref 80–100)
PO2 BLDA: 232 MMHG (ref 80–100)
PO2 BLDA: 498 MMHG (ref 80–100)
POC BE: 0 MMOL/L
POC BE: 3 MMOL/L
POC BE: 6 MMOL/L
POC IONIZED CALCIUM: 1.06 MMOL/L (ref 1.06–1.42)
POC IONIZED CALCIUM: 1.1 MMOL/L (ref 1.06–1.42)
POC IONIZED CALCIUM: 1.12 MMOL/L (ref 1.06–1.42)
POC SATURATED O2: 100 % (ref 95–100)
POC TCO2: 24 MMOL/L (ref 23–27)
POC TCO2: 28 MMOL/L (ref 23–27)
POC TCO2: 30 MMOL/L (ref 23–27)
POTASSIUM BLD-SCNC: 3.3 MMOL/L (ref 3.5–5.1)
POTASSIUM BLD-SCNC: 3.8 MMOL/L (ref 3.5–5.1)
POTASSIUM BLD-SCNC: 4 MMOL/L (ref 3.5–5.1)
POTASSIUM SERPL-SCNC: 4.4 MMOL/L (ref 3.5–5.1)
PROT SERPL-MCNC: 5.2 G/DL (ref 6–8.4)
PROTHROMBIN TIME: 13.6 SEC (ref 9–12.5)
RBC # BLD AUTO: 2.25 M/UL (ref 4–5.4)
RBC # BLD AUTO: 2.65 M/UL (ref 4–5.4)
RBC # BLD AUTO: 2.71 M/UL (ref 4–5.4)
SAMPLE: ABNORMAL
SODIUM BLD-SCNC: 143 MMOL/L (ref 136–145)
SODIUM BLD-SCNC: 143 MMOL/L (ref 136–145)
SODIUM BLD-SCNC: 145 MMOL/L (ref 136–145)
SODIUM SERPL-SCNC: 137 MMOL/L (ref 136–145)
TRANS ERYTHROCYTES VOL PATIENT: NORMAL ML
UNIT NUMBER: NORMAL
UNIT NUMBER: NORMAL
WBC # BLD AUTO: 2.91 K/UL (ref 3.9–12.7)
WBC # BLD AUTO: 4.6 K/UL (ref 3.9–12.7)
WBC # BLD AUTO: 4.72 K/UL (ref 3.9–12.7)

## 2022-03-03 PROCEDURE — 99233 PR SUBSEQUENT HOSPITAL CARE,LEVL III: ICD-10-PCS | Mod: ,,, | Performed by: INTERNAL MEDICINE

## 2022-03-03 PROCEDURE — 99233 SBSQ HOSP IP/OBS HIGH 50: CPT | Mod: ,,, | Performed by: PSYCHIATRY & NEUROLOGY

## 2022-03-03 PROCEDURE — 25000003 PHARM REV CODE 250: Performed by: STUDENT IN AN ORGANIZED HEALTH CARE EDUCATION/TRAINING PROGRAM

## 2022-03-03 PROCEDURE — 84100 ASSAY OF PHOSPHORUS: CPT | Performed by: NURSE PRACTITIONER

## 2022-03-03 PROCEDURE — 85025 COMPLETE CBC W/AUTO DIFF WBC: CPT | Performed by: STUDENT IN AN ORGANIZED HEALTH CARE EDUCATION/TRAINING PROGRAM

## 2022-03-03 PROCEDURE — P9021 RED BLOOD CELLS UNIT: HCPCS

## 2022-03-03 PROCEDURE — 25000003 PHARM REV CODE 250

## 2022-03-03 PROCEDURE — 83735 ASSAY OF MAGNESIUM: CPT | Performed by: NURSE PRACTITIONER

## 2022-03-03 PROCEDURE — P9021 RED BLOOD CELLS UNIT: HCPCS | Performed by: NEUROLOGICAL SURGERY

## 2022-03-03 PROCEDURE — 80053 COMPREHEN METABOLIC PANEL: CPT

## 2022-03-03 PROCEDURE — 94761 N-INVAS EAR/PLS OXIMETRY MLT: CPT

## 2022-03-03 PROCEDURE — P9035 PLATELET PHERES LEUKOREDUCED: HCPCS | Performed by: STUDENT IN AN ORGANIZED HEALTH CARE EDUCATION/TRAINING PROGRAM

## 2022-03-03 PROCEDURE — 63600175 PHARM REV CODE 636 W HCPCS: Performed by: PHYSICIAN ASSISTANT

## 2022-03-03 PROCEDURE — 25000003 PHARM REV CODE 250: Performed by: PHYSICIAN ASSISTANT

## 2022-03-03 PROCEDURE — 10061 I&D ABSCESS COMP/MULTIPLE: CPT

## 2022-03-03 PROCEDURE — 99233 SBSQ HOSP IP/OBS HIGH 50: CPT | Mod: ,,, | Performed by: INTERNAL MEDICINE

## 2022-03-03 PROCEDURE — 63600175 PHARM REV CODE 636 W HCPCS: Performed by: STUDENT IN AN ORGANIZED HEALTH CARE EDUCATION/TRAINING PROGRAM

## 2022-03-03 PROCEDURE — 85610 PROTHROMBIN TIME: CPT

## 2022-03-03 PROCEDURE — 20000000 HC ICU ROOM

## 2022-03-03 PROCEDURE — 99233 PR SUBSEQUENT HOSPITAL CARE,LEVL III: ICD-10-PCS | Mod: ,,, | Performed by: PSYCHIATRY & NEUROLOGY

## 2022-03-03 PROCEDURE — 85025 COMPLETE CBC W/AUTO DIFF WBC: CPT | Mod: 91

## 2022-03-03 PROCEDURE — A4216 STERILE WATER/SALINE, 10 ML: HCPCS | Performed by: NEUROLOGICAL SURGERY

## 2022-03-03 PROCEDURE — 36430 TRANSFUSION BLD/BLD COMPNT: CPT

## 2022-03-03 PROCEDURE — 85025 COMPLETE CBC W/AUTO DIFF WBC: CPT | Mod: 91 | Performed by: PSYCHIATRY & NEUROLOGY

## 2022-03-03 PROCEDURE — 25000003 PHARM REV CODE 250: Performed by: NEUROLOGICAL SURGERY

## 2022-03-03 PROCEDURE — C1751 CATH, INF, PER/CENT/MIDLINE: HCPCS

## 2022-03-03 RX ORDER — HYDROCODONE BITARTRATE AND ACETAMINOPHEN 500; 5 MG/1; MG/1
TABLET ORAL
Status: DISCONTINUED | OUTPATIENT
Start: 2022-03-03 | End: 2022-03-03

## 2022-03-03 RX ORDER — ACETAMINOPHEN 500 MG
1000 TABLET ORAL EVERY 6 HOURS
Status: DISCONTINUED | OUTPATIENT
Start: 2022-03-03 | End: 2022-03-15 | Stop reason: HOSPADM

## 2022-03-03 RX ORDER — HYDROCODONE BITARTRATE AND ACETAMINOPHEN 500; 5 MG/1; MG/1
TABLET ORAL
Status: DISCONTINUED | OUTPATIENT
Start: 2022-03-03 | End: 2022-03-04

## 2022-03-03 RX ORDER — OXYCODONE HYDROCHLORIDE 10 MG/1
20 TABLET ORAL EVERY 4 HOURS PRN
Status: DISCONTINUED | OUTPATIENT
Start: 2022-03-03 | End: 2022-03-15 | Stop reason: HOSPADM

## 2022-03-03 RX ORDER — HYDROMORPHONE HYDROCHLORIDE 1 MG/ML
2 INJECTION, SOLUTION INTRAMUSCULAR; INTRAVENOUS; SUBCUTANEOUS
Status: DISCONTINUED | OUTPATIENT
Start: 2022-03-03 | End: 2022-03-04

## 2022-03-03 RX ADMIN — ACETAMINOPHEN 1000 MG: 325 TABLET ORAL at 07:03

## 2022-03-03 RX ADMIN — Medication 6 MG: at 07:03

## 2022-03-03 RX ADMIN — OXYCODONE HYDROCHLORIDE 10 MG: 10 TABLET ORAL at 04:03

## 2022-03-03 RX ADMIN — OXYCODONE HYDROCHLORIDE 15 MG: 10 TABLET ORAL at 05:03

## 2022-03-03 RX ADMIN — Medication 10 ML: at 06:03

## 2022-03-03 RX ADMIN — CEFTRIAXONE 2 G: 2 INJECTION, POWDER, FOR SOLUTION INTRAMUSCULAR; INTRAVENOUS at 02:03

## 2022-03-03 RX ADMIN — HYDROMORPHONE HYDROCHLORIDE 2 MG: 1 INJECTION, SOLUTION INTRAMUSCULAR; INTRAVENOUS; SUBCUTANEOUS at 08:03

## 2022-03-03 RX ADMIN — PANTOPRAZOLE SODIUM 40 MG: 20 TABLET, DELAYED RELEASE ORAL at 08:03

## 2022-03-03 RX ADMIN — HYDROMORPHONE HYDROCHLORIDE 1 MG: 1 INJECTION, SOLUTION INTRAMUSCULAR; INTRAVENOUS; SUBCUTANEOUS at 02:03

## 2022-03-03 RX ADMIN — PREGABALIN 75 MG: 75 CAPSULE ORAL at 03:03

## 2022-03-03 RX ADMIN — PREGABALIN 75 MG: 75 CAPSULE ORAL at 08:03

## 2022-03-03 RX ADMIN — Medication 10 ML: at 12:03

## 2022-03-03 RX ADMIN — OXYCODONE HYDROCHLORIDE 20 MG: 10 TABLET ORAL at 03:03

## 2022-03-03 RX ADMIN — HYDROMORPHONE HYDROCHLORIDE 2 MG: 1 INJECTION, SOLUTION INTRAMUSCULAR; INTRAVENOUS; SUBCUTANEOUS at 09:03

## 2022-03-03 RX ADMIN — LIDOCAINE 1 PATCH: 50 PATCH CUTANEOUS at 04:03

## 2022-03-03 RX ADMIN — SENNOSIDES AND DOCUSATE SODIUM 1 TABLET: 50; 8.6 TABLET ORAL at 08:03

## 2022-03-03 RX ADMIN — SERTRALINE HYDROCHLORIDE 50 MG: 50 TABLET ORAL at 08:03

## 2022-03-03 RX ADMIN — OXYCODONE HYDROCHLORIDE 20 MG: 10 TABLET ORAL at 11:03

## 2022-03-03 RX ADMIN — OXYCODONE HYDROCHLORIDE 15 MG: 10 TABLET ORAL at 01:03

## 2022-03-03 RX ADMIN — ACETAMINOPHEN 1000 MG: 325 TABLET ORAL at 11:03

## 2022-03-03 RX ADMIN — ACETAMINOPHEN 1000 MG: 500 TABLET ORAL at 05:03

## 2022-03-03 RX ADMIN — Medication 10 ML: at 05:03

## 2022-03-03 NOTE — PLAN OF CARE
[facility]  Discharge Reassessment    Primary Care Provider: Dannielle Merino DO    Expected Discharge Date: 3/7/2022\    Patient S/p  Post-Op Info:  Procedure(s) (LRB):  FUSION, SPINE, LUMBAR (Bilateral)   1 Day Post-Op    Per MD, patient to step down to the floor today.   Pre-op therapy recs were Rehab.  Awaiting post op eval for needs.     Reassessment (most recent)     Discharge Reassessment - 03/03/22 1631        Discharge Reassessment    Assessment Type Discharge Planning Reassessment     Did the patient's condition or plan change since previous assessment? No     Communicated SHIRA with patient/caregiver Yes     Discharge Plan A Rehab     Discharge Plan B Home with family     DME Needed Upon Discharge  none     Discharge Barriers Identified None     Why the patient remains in the hospital Requires continued medical care                 Ernestina Elizabeth, RN, CCRN-K, [facility]  Neuro-Critical Care   X [phone]

## 2022-03-03 NOTE — PLAN OF CARE
"Ms. Zazueta is 42 y/o F with PMH of cirrhosis, Hep C, pancytopenia, polysubstance abuse, and epidural abscess s/p L3-L4 laminectomy and L3-L5 TLIF (4/2021; blood and surgical cultures positive for group B strep) admitted from [facility] with progressive lumbar spondylodiscitis and hardware failure. IM6 consulted for preop risk stratification and medical optimization.      Pt looks well, A&Ox4. Reports some left leg cramp is better today. Chart reviewed. S/p washout and hardware removal 2/21. Plan for reinstrumentation on 3/2. She may need platelet and PRBCs transfusion before the surgery tomorrow.      MELD-Na score: 11 at 3/3/2022  2:32 AM  MELD score: 11 at 3/3/2022  2:32 AM  Calculated from:  Serum Creatinine: 0.5 mg/dL (Using min of 1 mg/dL) at 3/3/2022  2:32 AM  Serum Sodium: 137 mmol/L at 3/3/2022  2:32 AM  Total Bilirubin: 1.5 mg/dL at 3/3/2022  2:32 AM  INR(ratio): 1.3 at 3/3/2022  2:32 AM  Age: 41 years     - RCRI 0   - blood and biopsy cultures negative so far  - surgical cultures negative so far   - continue rocephin per ID recs, PICC placed   - History of leukopenia, today WBC are 1.93 with ANC of 1300. Continue to trend CBC.  - daily CMP, INR   - will need outpatient hepatology referral    - recommend placement on discharge for continued IV abx given hx of drug use and noncompliance with previous IV abx (per ID will likely need 8 weeks of antibiotics from day of surgery)     Please secure [facility] Medicine  ("Medicine Consult Only") or contact me directly for any additional questions or concerns  "

## 2022-03-03 NOTE — PROGRESS NOTES
[facility] Critical Care  Neurosurgery  Progress Note    Subjective:     History of Present Illness: Rachel Zazueta is a 41 y.o.  female with PMHx of Hepatitis C, liver cirrhosis, pancytopenia, and polysubstance abuse, who presents to clinic for follow up with new imaging s/p L3 and L4 laminectomy for evacuation of epidural abscess and L3-L5 TLIF on 04/16/2021.     The pt c/o worsening back and left leg pain since October. She states that she is no longer using IV drugs. States only using marijuana. Describes the left leg pain as a shock down the leg when standing for 10 minutes. Denies taking any antibiotics. Endorses new weakness in the legs. Denies b/b dysfunction. Denies new neck pain. She ambulates with a walker at home. States that she smoke.    She presents to ED as direct admit from clinic.       Post-Op Info:  Procedure(s) (LRB):  FUSION, SPINE, LUMBAR (Bilateral)   1 Day Post-Op     Interval History: 3/3 POD1 s/p T10-Pelvis posterior instrumented fusion with L5-S1 TLIF. Tolerated procedure well, two drains to remain in place, plts low, tranfuse 2 packs today. Stable to TTF today    Medications:  Continuous Infusions:   sodium chloride 0.9% 10 mL/hr at 02/27/22 0305     Scheduled Meds:   acetaminophen  1,000 mg Oral Q8H    cefTRIAXone (ROCEPHIN) IVPB  2 g Intravenous Q12H    LIDOcaine  1 patch Transdermal Q24H    pantoprazole  40 mg Oral Daily    pregabalin  75 mg Oral TID    senna-docusate 8.6-50 mg  1 tablet Oral Daily    sertraline  50 mg Oral Daily    sodium chloride 0.9%  10 mL Intravenous Q6H     PRN Meds:sodium chloride, sodium chloride, sodium chloride, aluminum-magnesium hydroxide-simethicone, dextrose 10%, dextrose 10%, glucagon (human recombinant), glucose, glucose, HYDROmorphone, melatonin, oxyCODONE, oxyCODONE, polyethylene glycol, Flushing PICC Protocol **AND** sodium chloride 0.9% **AND** sodium chloride 0.9%     Objective:     Weight: 133.8 kg (294 lb 15.6 oz)  Body mass  index is 46.2 kg/m².  Vital Signs (Most Recent):  Temp: 98.5 °F (36.9 °C) (03/03/22 0748)  Pulse: 101 (03/03/22 0748)  Resp: 14 (03/03/22 0748)  BP: 128/66 (03/03/22 0748)  SpO2: 99 % (03/03/22 0748) Vital Signs (24h Range):  Temp:  [96.9 °F (36.1 °C)-99 °F (37.2 °C)] 98.5 °F (36.9 °C)  Pulse:  [] 101  Resp:  [11-25] 14  SpO2:  [95 %-100 %] 99 %  BP: (114-169)/(57-87) 128/66  Arterial Line BP: ()/(52-81) 118/61                          Closed/Suction Drain 02/21/22 1644 Left;Medial Back Accordion 10 Fr. (Active)   Site Description Healing 02/28/22 2053   Dressing Type Other (Comment) 02/28/22 2053   Dressing Status Intact 02/27/22 2048   Dressing Intervention Integrity maintained 02/28/22 1800   Drainage Serosanguineous 02/28/22 2053   Status To bulb suction 02/28/22 2053   Output (mL) 100 mL 03/01/22 0500            Closed/Suction Drain 02/21/22 1645 Right;Medial Back Accordion 10 Fr. (Active)   Site Description Healing 02/28/22 2053   Dressing Type Other (Comment) 02/28/22 2053   Dressing Status Intact 02/28/22 1800   Dressing Intervention Integrity maintained 02/28/22 1800   Drainage Serosanguineous 02/28/22 2053   Status To bulb suction 02/28/22 2053   Output (mL) 20 mL 03/01/22 0500       Female External Urinary Catheter 02/22/22 2300 (Active)   Skin no redness;no breakdown 02/28/22 2053   Tolerance no signs/symptoms of discomfort 02/28/22 2053   Suction Continuous suction at 40 mmHg 02/28/22 2053   Date of last wick change 02/26/22 02/28/22 2053   Time of last wick change 2000 02/26/22 2000   Output (mL) 500 mL 02/27/22 0701     Neurosurgery Physical Exam  General: Well developed, well nourished, not in acute distress.   Head: Normocephalic, atraumatic.  Neck: Full ROM.   Neurologic: Alert and oriented x 4. Thought content appropriate.  GCS: Motor:6  Verbal:5  Eyes:4   GCS Total: 15  Language: No aphasia.  Speech: No dysarthria.  Cranial nerves: Face symmetric, tongue midline, CN II-XII grossly  intact.   Eyes: Pupils equal, round, reactive to light with accomodation, EOMI.   Pulmonary: Normal respirations, no signs of respiratory distress.  Abdomen: Soft, non-distended, non-tender to palpation.  Sensory: Intact to light touch throughout.  Motor Strength: Moves all extremities spontaneously with good tone. No abnormal movements seen.      Strength   Deltoids Triceps Biceps Wrist Extension Wrist Flexion Hand    Upper: R 5/5 5/5 5/5 5/5 5/5 5/5     L 5/5 5/5 5/5 5/5 5/5 5/5       Hip Flexion Knee Extension Knee  Flexion Dorsiflexion Plantar flexion EHL   Lower: R 5/5 5/5 5/5 5/5 5/5 5/5     L 4/5 4/5 4/5 5/5 5/5 5/5      Parker: Absent.  Clonus: 3 beats bilaterally.  Skin: Skin is warm, dry and intact.     Incision c/d/i with staples. No surrounding erythema or edema. No drainage from incision. No palpable hematoma or underlying fluid collection.     HV drains in place.    Significant Labs:  Recent Labs   Lab 03/02/22 0424 03/02/22 1423 03/03/22  0232   * 125* 115*    137 137   K 3.7 4.1 4.4    104 107   CO2 26 26 24   BUN 16 15 16   CREATININE 0.6 0.6 0.5   CALCIUM 7.6* 7.7* 7.8*   MG  --   --  1.6     Recent Labs   Lab 03/02/22  0424 03/02/22  0933 03/02/22  1209 03/02/22  1423 03/03/22  0232   WBC 1.46*  --   --  4.60 2.91*   HGB 7.4*  --   --  8.6* 7.8*   HCT 23.9*   < > 24* 27.2* 24.0*   PLT 72*  --   --  92* 69*    < > = values in this interval not displayed.     Recent Labs   Lab 03/02/22 0424 03/03/22  0232   INR 1.3* 1.3*     Microbiology Results (last 7 days)       Procedure Component Value Units Date/Time    Culture, Anaerobe [[id number]] Collected: 03/02/22 1228    Order Status: Completed Specimen: Back Updated: 03/03/22 0714     Anaerobic Culture Culture in progress    Narrative:      L5/S1 DISC    Aerobic culture [[id number]] Collected: 03/02/22 1228    Order Status: Completed Specimen: Back Updated: 03/03/22 0634     Aerobic Bacterial Culture No growth    Narrative:       L5/S1 DISC    Fungus culture [[id number]] Collected: 02/21/22 1539    Order Status: Completed Specimen: Abscess from Back Updated: 03/02/22 1435     Fungus (Mycology) Culture Culture in progress    Narrative:      Epidural purulence #1    Fungus culture [[id number]] Collected: 02/21/22 1539    Order Status: Completed Specimen: Abscess from Back Updated: 03/02/22 1435     Fungus (Mycology) Culture Culture in progress    Narrative:      Epidural purulence #2    Gram stain [[id number]] Collected: 03/02/22 1228    Order Status: Completed Specimen: Back Updated: 03/02/22 1423     Gram Stain Result No WBC's      No organisms seen    Narrative:      L5/S1 DISC    AFB Culture & Smear [[id number]] Collected: 03/02/22 1228    Order Status: Sent Specimen: Back Updated: 03/02/22 1400    Fungus culture [[id number]] Collected: 03/02/22 1228    Order Status: Sent Specimen: Back Updated: 03/02/22 1251    Fungus culture [[id number]] Collected: 02/15/22 1128    Order Status: Completed Specimen: Biopsy from Back Updated: 03/02/22 1028     Fungus (Mycology) Culture Culture in progress      No fungus isolated after 2 weeks    Narrative:      L3-4 / L4-5 osteodiscitis    Aerobic culture [[id number]] Collected: 02/21/22 1539    Order Status: Completed Specimen: Abscess from Back Updated: 02/25/22 0957     Aerobic Bacterial Culture No growth    Narrative:      Epidural purulence #1    Culture, Anaerobe [[id number]] Collected: 02/21/22 1539    Order Status: Completed Specimen: Abscess from Back Updated: 02/25/22 0728     Anaerobic Culture No anaerobes isolated    Narrative:      Epidural purulence #2    Culture, Anaerobe [[id number]] Collected: 02/21/22 1539    Order Status: Completed Specimen: Abscess from Back Updated: 02/25/22 0728     Anaerobic Culture No anaerobes isolated    Narrative:      Epidural purulence #1    Aerobic culture [[id number]] Collected: 02/21/22 1539    Order Status: Completed Specimen: Abscess from Back Updated: 02/24/22  1124     Aerobic Bacterial Culture No growth    Narrative:      Epidural purulence #2          All pertinent labs from the last 24 hours have been reviewed.    Significant Diagnostics:  I have reviewed and interpreted all pertinent imaging results/findings within the past 24 hours.I    Assessment/Plan:     * Spondylodiscitis  Rachel Zazueta is a 41 F with PMH hepatitis C, cirrhosis, pancytopenia, nicotine abuse, hx IVDU (last use 2 years ago), strep agalactae bacteremia, s/p L3-5 TLIF on 4/16/2021 who presents with persistent and progressive lumbar spondylodiscitis with resultant hardware failure and new scoliosis.     S/p hardware removal on 2/21. Now s/p T10 t pelvis posterior instrumented fusion with L5-S1 TLIF    Plan:  --Patient admitted to [facility], okay to TTF to NSGY service today      -q2h neurochecks on PCU  --Post op XR Pending  --All labs and diagnostics personally reviewed  --Follow-up MRI Brain, C/T/L w/wo contrast:   -No evidence of osteomyelitis, discitis, or epidural abscess within the cervical or thoracic spine   -No diffusion positive or abnormal enhancement within the brain   -MRI brain shows patchy nonspecific increased T2 and FLAIR signal in the periventricular and subcortical white matter bilaterally. Could be due to chronic microvascular ischemic changes vs demyelinating disease given patient's age. No evidence of active demyelination.   -DDD C4-7 with mild to moderate canal stenosis  --Full infectious w/u.   -Inflammatory markers wnl. BCx 2/14 NGTD, UA/Ucx pending. Wound Cx pending.   -F/u ID recs - now on Rocephin. PICC placed.   --S/p L3-4 disc biopsy and L3 bone biopsy with IR 2/15 - Gram stain rare WBC. Cx NGTD.  --Utox + for amphetamines, addiction psych consulted per HM.   --Hold anti-plt/coag medications.  --Monitor for urinary retention - voiding spontaneously. Bladder scan prn.  --Pain control: Oxy 10 q4h PRN for moderate pain, Oxy 15 q4h PRN for severe pain, Dilaudid 1 mg q4h PRN  when pain not relieved by PO meds. Continue lyrica 50 mg TID.  --Drains: Keep, continue IV abx while in place. High output, will switch to gravity.  --Plts 69, tranfuse 2 packs today, ordered  --DVT ppx: TEDs/SCDs  --Bowel regimen: Miralax, senna, lactulose 2/23. + BM.  --Activity: PT/OT OOB, TLSO brace when OOB.  --Continue to monitor clinically, notify NSGY immediately with any changes in neuro status.    Discussed with Dr. Templeton.    Dispo: PTOT OOB today. Anticipate rehab.        Carlin Quach MD  Neurosurgery  [facility]

## 2022-03-03 NOTE — ASSESSMENT & PLAN NOTE
U/S abdomen with doppler showed cirrhosis and portal hypertension satisfactory Doppler evaluation   MELD-Na score: 11 at 3/3/2022  2:32 AM  MELD score: 11 at 3/3/2022  2:32 AM  Calculated from:  Serum Creatinine: 0.5 mg/dL (Using min of 1 mg/dL) at 3/3/2022  2:32 AM  Serum Sodium: 137 mmol/L at 3/3/2022  2:32 AM  Total Bilirubin: 1.5 mg/dL at 3/3/2022  2:32 AM  INR(ratio): 1.3 at 3/3/2022  2:32 AM  Age: 41 years    --daily CMP and INR  --will need hepatology referral at time of discharge  --Lipase normal

## 2022-03-03 NOTE — OP NOTE
Stage 2 of 2    DATE OF SURGERY: 3/2/22     PREOPERATIVE DIAGNOSIS:  1. 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 Polysubstance abuse and Class III obesity     POSTOPERATIVE DIAGNOSIS:  Same.     PROCEDURE PERFORMED:  1. Wound washout and excisional debridement, T10 to sacrum   2. Posterior spinal fusion, T10 to pelvis   3. Posterior segmental spinal fixation, T10 to S1 (DePuy Synthes)  4. Pelvic fixation with bilateral S2AI screws (Depuy Synthes)  5. Transforaminal lumbar interbody fusion, L5-S1  6.  Application of titanium interbody spacer, L5-S1 (DePuy Synthes)  7.  L3-4 disc debridement for discitis  8.  Use of intraoperative fluoroscopy  9.  Use of intraoperative neuro-monitoring with MEPs  10. Use of intraoperative CT neuronavigation  11. Infuse and synthetic bone grafting     SURGEON: Guille Templeton M.D.     CO-SURGEON: Mario Alberto Camilo M.D. -- Dr. Camilo assisted with the placement of all spinal instrumentation because a qualified resident was not available for this portion of the procedure.     ANESTHESIA: GETA     ESTIMATED BLOOD LOSS: 500mL     COMPLICATIONS: Durotomy s/p watertight repair     DRAINS: Two deep and Prevena     SPECIMENS SENT: Cultures of L5-S1 disc space     FINDINGS: None     INDICATIONS:     See my note from Stage 1. She tolerated this stage well. The goal of this stage 2 was to wash the wound out a second time and to stabilize her spine and correct her scoliotic deformity. R/B/A/I/M were reviewed in detail and she wished to proceed.  .  PROCEDURE:     The patient was brought into the operating room where she was intubated and placed under general anesthesia without difficulty.  All lines were placed. she repositioned prone onto the operating room table with appropriate padding all pressure points. The region of interest was localized by the fresh incision from stage 1. We removed the staples and both drains from stage 1. The skin was marked and  the area was prepped and draped in the usual sterile fashion.  A timeout was performed prior to procedure.       We opened the previous fresh incision by cutting sutures with metzenbaum scissors and discarding the old suture material. We opened the fascia in similar fashion. T10 to the sacrum were exposed from stage 1 and were in full view. We sharply debrided the paraspinal soft tissues and spinal bone from T10 to the sacrum with curettes. We irrigated the space with betadine/peroxide and a pulsevac lavage. We also removed the antibiotic beads from stage 1.     The CT neuro navigation array was docked onto the right PSIS throught a separate stab incision and a CT spin was acquired.  The intraoperative CT confirmed levels.  Using neuro-navigation pedicle screws were placed bilaterally from T10 to S1, skipping left L4 which had inadequate bone quality. All lumbar screws were stimulated with the neuromonitoring probe and found to stimulate above 20milliamperes. Bilateral pelvic fixation was achieved with S2AI screws, using navigation. Xrays showed good screw position. Next we augmented bilateral T10 and T11 screws with cement under flouro and confirmed no significant cement extravasation.     Attention was turned to performance of a transforaminal lumbar interbody fusion at L5-S1 from a right-sided approach. A standared L5 laminecomty was performed to access the thecal sac.  A nerve root retractor was used to retract the thecal sac medially and expose the L5-S1 disc space.  Epidural veins were cauterized and divided.  An annulotomy was performed with a 15 blade.  A pituitary rongeur was used to remove disc material, which appeared suspicious for infection. We cultured this disc space.  The endplates were prepared with disc silvino, rasps, and curettes.  We packed synthetic bone putty into the L5-S1 disc space for anterior arthrodesis and we placed a titanium cage into the disc space. Xrays showed good cage  position.    Next we debrided the left sided L3-4 disc space. We widened this space by distracting on a left side temporary frankie and we resected the left L4 superior articulating process with an osteotome. This gave us access to the disc space. We debrided it for infection. During this process we encountered a durotomy over the left L3 nerve root which we packed with a myofascial plug and fibrin glue, achieving a watertight closure.     Bilateral titanium rods were sized, contoured and reduced into the tulip heads.  Set screws were tightened. Final xrays showed excellent reduction of the deformity and excellent position of all hardware.  The wound was copiously irrigated with sterile normal saline and a dilute Betadyne solution. Exposed bony surfaces from T10 to the sacrum were decorticated bilaterally with a high-speed drill.  Infuse, synthetic bone and cancellous chips were placed posteriorly for arthrodesis from T10 to the sacrum. Two deep Hemovacs were placed.  A watertight fascial closure was achieved with interrupted 0 Vicryl sutures and a running Stratafix suture.  The soft tissues were closed in layers. Staples were placed at the skin and a Prevena dressing was applied.     The patient appeared to tolerate the procedure well from a hemodynamic and neuromonitoring standpoint. MEPs were present and stable in all extremities throughout the case. Dr. Camilo and I were present for all critical portions of the case, and at the end of the case all counts were correct. The patient was repositioned supine onto the hospital bed where she was extubated and allowed to emerge from anesthesia. She was sent to the ICU in stable condition for recovery.    JUSTIFICATION OF CO-SURGEON AND MODIFIER 22: This was a complex multistage deformity operation for infection in a patient with documented noncompliance with medical treatment, polysubstance abuse and Class III obesity. Inherent risks of the procedure were significantly  elevated over similar procedures.. Two attending surgeons were indicated to reduce operative times, blood loss, and improve patient outcomes.

## 2022-03-03 NOTE — SUBJECTIVE & OBJECTIVE
Interval History:  See above.     Review of Systems   Constitutional:  Negative for fatigue.   Gastrointestinal:  Negative for diarrhea and vomiting.   Musculoskeletal:  Positive for back pain. Negative for gait problem.   Skin:  Negative for wound.     Objective:     Vitals:  Temp: 98.4 °F (36.9 °C)  Pulse: 108  Rhythm: sinus tachycardia  BP: 116/62  MAP (mmHg): 83  Resp: 14  SpO2: 98 %  O2 Device (Oxygen Therapy): room air    Temp  Min: 96.9 °F (36.1 °C)  Max: 99 °F (37.2 °C)  Pulse  Min: 87  Max: 117  BP  Min: 108/58  Max: 169/87  MAP (mmHg)  Min: 75  Max: 114  Resp  Min: 12  Max: 31  SpO2  Min: 95 %  Max: 100 %    03/02 0701 - 03/03 0700  In: 3432.3 [P.O.:486; I.V.:70]  Out: 2475 [Urine:1625; Drains:350]   Unmeasured Output  Urine Occurrence: 1  Stool Occurrence: 1  Pad Count: 1       Physical Exam  Vitals and nursing note reviewed.   Constitutional:       Appearance: She is obese.   HENT:      Head: Normocephalic.      Nose: Nose normal.      Mouth/Throat:      Mouth: Mucous membranes are moist.   Eyes:      Extraocular Movements: Extraocular movements intact.      Pupils: Pupils are equal, round, and reactive to light.   Cardiovascular:      Rate and Rhythm: Normal rate.   Pulmonary:      Effort: No respiratory distress.   Abdominal:      General: There is distension.      Tenderness: There is no abdominal tenderness.   Musculoskeletal:         General: Normal range of motion.   Neurological:      Mental Status: She is alert.      GCS: GCS eye subscore is 4. GCS verbal subscore is 5. GCS motor subscore is 6.      Cranial Nerves: No cranial nerve deficit.      Sensory: No sensory deficit.      Motor: Weakness present.      Comments: Moving all 4 extremities spontaneously   W/D to pain to bl lower extremities. No saddle anesthesia noted          Medications:  Continuoussodium chloride 0.9%, Last Rate: 10 mL/hr at 02/27/22 0305    Scheduledacetaminophen, 1,000 mg, Q6H  cefTRIAXone (ROCEPHIN) IVPB, 2 g,  Q12H  LIDOcaine, 1 patch, Q24H  pantoprazole, 40 mg, Daily  pregabalin, 75 mg, TID  senna-docusate 8.6-50 mg, 1 tablet, Daily  sertraline, 50 mg, Daily  sodium chloride 0.9%, 10 mL, Q6H    PRNsodium chloride, , Q24H PRN  aluminum-magnesium hydroxide-simethicone, 30 mL, Q6H PRN  dextrose 10%, 12.5 g, PRN  dextrose 10%, 25 g, PRN  glucagon (human recombinant), 1 mg, PRN  glucose, 16 g, PRN  glucose, 24 g, PRN  HYDROmorphone, 2 mg, Q3H PRN  melatonin, 6 mg, Nightly PRN  oxyCODONE, 10 mg, Q4H PRN  oxyCODONE, 20 mg, Q4H PRN  polyethylene glycol, 17 g, Daily PRN  sodium chloride 0.9%, 10 mL, PRN      Today I personally reviewed pertinent medications, lines/drains/airways, imaging, cardiology results, laboratory results, microbiology results, notably:    Diet  Diet Adult Regular (IDDSI Level 7)  Diet Adult Regular (IDDSI Level 7)

## 2022-03-03 NOTE — ASSESSMENT & PLAN NOTE
Rachel Zazueta is a 41 F with PMH hepatitis C, cirrhosis, pancytopenia, nicotine abuse, hx IVDU (last use 2 years ago), strep agalactae bacteremia, s/p L3-5 TLIF on 4/16/2021 who presents with persistent and progressive lumbar spondylodiscitis with resultant hardware failure and new scoliosis.     S/p hardware removal on 2/21. Now s/p T10 t pelvis posterior instrumented fusion with L5-S1 TLIF    Plan:  --Patient admitted to [facility], okay to TTF to NSGY service today      -q2h neurochecks on PCU  --Post op XR Pending  --All labs and diagnostics personally reviewed  --Follow-up MRI Brain, C/T/L w/wo contrast:   -No evidence of osteomyelitis, discitis, or epidural abscess within the cervical or thoracic spine   -No diffusion positive or abnormal enhancement within the brain   -MRI brain shows patchy nonspecific increased T2 and FLAIR signal in the periventricular and subcortical white matter bilaterally. Could be due to chronic microvascular ischemic changes vs demyelinating disease given patient's age. No evidence of active demyelination.   -DDD C4-7 with mild to moderate canal stenosis  --Full infectious w/u.   -Inflammatory markers wnl. BCx 2/14 NGTD, UA/Ucx pending. Wound Cx pending.   -F/u ID recs - now on Rocephin. PICC placed.   --S/p L3-4 disc biopsy and L3 bone biopsy with IR 2/15 - Gram stain rare WBC. Cx NGTD.  --Utox + for amphetamines, addiction psych consulted per .   --Hold anti-plt/coag medications.  --Monitor for urinary retention - voiding spontaneously. Bladder scan prn.  --Pain control: Oxy 10 q4h PRN for moderate pain, Oxy 15 q4h PRN for severe pain, Dilaudid 1 mg q4h PRN when pain not relieved by PO meds. Continue lyrica 50 mg TID.  --Drains: Keep, continue IV abx while in place. High output, will switch to gravity.  --Plts 69, tranfuse 2 packs today, ordered  --DVT ppx: TEDs/SCDs  --Bowel regimen: Miralax, senna, lactulose 2/23. + BM.  --Activity: PT/OT OOB, TLSO brace when OOB.  --Continue to  monitor clinically, notify NSGY immediately with any changes in neuro status.    Discussed with Dr. Templeton.    Dispo: PTOT OOB today. Anticipate rehab.

## 2022-03-03 NOTE — PLAN OF CARE
[facility] Care Plan    POC reviewed with Rachel Zazueta and family at 0300. Pt verbalized understanding. Questions and concerns addressed. No acute events overnight. Neuro status was unchanged. VS was stabled. PRN pain medicine gave around o'clock as need to keep comfortable.  Will continue to monitor. See below and flowsheets for full assessment and VS info.       Neuro:  Jane Coma Scale  Best Eye Response: 4-->(E4) spontaneous  Best Motor Response: 6-->(M6) obeys commands  Best Verbal Response: 5-->(V5) oriented  [place] Coma Scale Score: 15  Assessment Qualifiers: patient not sedated/intubated  Pupil PERRLA: yes     24hr Temp:  [96.9 °F (36.1 °C)-99 °F (37.2 °C)]     CV:   Rhythm: normal sinus rhythm  BP goals:   SBP < 180  MAP > 65    Resp:   O2 Device (Oxygen Therapy): room air       GI/:     Diet/Nutrition Received: regular  Last Bowel Movement: 03/01/22  Voiding Characteristics: urethral catheter (bladder)    Intake/Output Summary (Last 24 hours) at 3/3/2022 0455  Last data filed at 3/3/2022 0408  Gross per 24 hour   Intake 3292.26 ml   Output 2375 ml   Net 917.26 ml     Unmeasured Output  Urine Occurrence: 1  Stool Occurrence: 1  Pad Count: 1    Labs/Accuchecks:  Recent Labs   Lab 03/03/22  0232   WBC 2.91*   RBC 2.65*   HGB 7.8*   HCT 24.0*   PLT 69*      Recent Labs   Lab 03/03/22  0232      K 4.4   CO2 24      BUN 16   CREATININE 0.5   ALKPHOS 89   ALT 26   AST 58*   BILITOT 1.5*      Recent Labs   Lab 03/03/22  0232   INR 1.3*    No results for input(s): CPK, CPKMB, TROPONINI, MB in the last 168 hours.    Electrolytes: Electrolytes replaced  Accuchecks: none    Gtts:   sodium chloride 0.9% 10 mL/hr at 02/27/22 0305       LDA/Wounds:  Lines/Drains/Airways       Peripherally Inserted Central Catheter Line  Duration             PICC Double Lumen 02/22/22 1110 right basilic 8 days              Central Venous Catheter Line  Duration             Percutaneous Central Line Insertion/Assessment -  Double Lumen  03/02/22 1006 right internal jugular <1 day              Drain  Duration                  Closed/Suction Drain 03/02/22 1208 Left;Superior Back Accordion 10 Fr. <1 day         Closed/Suction Drain 03/02/22 1208 Right;Superior Back Accordion 10 Fr. <1 day         Urethral Catheter 03/02/22 0910 Straight-tip;Silicone 16 Fr. <1 day              Arterial Line  Duration             Arterial Line 03/02/22 0828 Left Radial <1 day              Peripheral Intravenous Line  Duration                  Peripheral IV - Single Lumen 02/21/22 1824 20 G Anterior;Left;Proximal Forearm 9 days                  Wounds: Yes  Wound care consulted: Yes

## 2022-03-03 NOTE — SUBJECTIVE & OBJECTIVE
Interval History: 3/3 POD1 s/p T10-Pelvis posterior instrumented fusion with L5-S1 TLIF. Tolerated procedure well, two drains to remain in place, plts low, tranfuse 2 packs today. Stable to TTF today    Medications:  Continuous Infusions:   sodium chloride 0.9% 10 mL/hr at 02/27/22 0305     Scheduled Meds:   acetaminophen  1,000 mg Oral Q8H    cefTRIAXone (ROCEPHIN) IVPB  2 g Intravenous Q12H    LIDOcaine  1 patch Transdermal Q24H    pantoprazole  40 mg Oral Daily    pregabalin  75 mg Oral TID    senna-docusate 8.6-50 mg  1 tablet Oral Daily    sertraline  50 mg Oral Daily    sodium chloride 0.9%  10 mL Intravenous Q6H     PRN Meds:sodium chloride, sodium chloride, sodium chloride, aluminum-magnesium hydroxide-simethicone, dextrose 10%, dextrose 10%, glucagon (human recombinant), glucose, glucose, HYDROmorphone, melatonin, oxyCODONE, oxyCODONE, polyethylene glycol, Flushing PICC Protocol **AND** sodium chloride 0.9% **AND** sodium chloride 0.9%     Objective:     Weight: 133.8 kg (294 lb 15.6 oz)  Body mass index is 46.2 kg/m².  Vital Signs (Most Recent):  Temp: 98.5 °F (36.9 °C) (03/03/22 0748)  Pulse: 101 (03/03/22 0748)  Resp: 14 (03/03/22 0748)  BP: 128/66 (03/03/22 0748)  SpO2: 99 % (03/03/22 0748) Vital Signs (24h Range):  Temp:  [96.9 °F (36.1 °C)-99 °F (37.2 °C)] 98.5 °F (36.9 °C)  Pulse:  [] 101  Resp:  [11-25] 14  SpO2:  [95 %-100 %] 99 %  BP: (114-169)/(57-87) 128/66  Arterial Line BP: ()/(52-81) 118/61                          Closed/Suction Drain 02/21/22 1644 Left;Medial Back Accordion 10 Fr. (Active)   Site Description Healing 02/28/22 2053   Dressing Type Other (Comment) 02/28/22 2053   Dressing Status Intact 02/27/22 2048   Dressing Intervention Integrity maintained 02/28/22 1800   Drainage Serosanguineous 02/28/22 2053   Status To bulb suction 02/28/22 2053   Output (mL) 100 mL 03/01/22 0500            Closed/Suction Drain 02/21/22 1645 Right;Medial Back Accordion 10 Fr. (Active)   Site  Description Healing 02/28/22 2053   Dressing Type Other (Comment) 02/28/22 2053   Dressing Status Intact 02/28/22 1800   Dressing Intervention Integrity maintained 02/28/22 1800   Drainage Serosanguineous 02/28/22 2053   Status To bulb suction 02/28/22 2053   Output (mL) 20 mL 03/01/22 0500       Female External Urinary Catheter 02/22/22 2300 (Active)   Skin no redness;no breakdown 02/28/22 2053   Tolerance no signs/symptoms of discomfort 02/28/22 2053   Suction Continuous suction at 40 mmHg 02/28/22 2053   Date of last wick change 02/26/22 02/28/22 2053   Time of last wick change 2000 02/26/22 2000   Output (mL) 500 mL 02/27/22 0701     Neurosurgery Physical Exam  General: Well developed, well nourished, not in acute distress.   Head: Normocephalic, atraumatic.  Neck: Full ROM.   Neurologic: Alert and oriented x 4. Thought content appropriate.  GCS: Motor:6  Verbal:5  Eyes:4   GCS Total: 15  Language: No aphasia.  Speech: No dysarthria.  Cranial nerves: Face symmetric, tongue midline, CN II-XII grossly intact.   Eyes: Pupils equal, round, reactive to light with accomodation, EOMI.   Pulmonary: Normal respirations, no signs of respiratory distress.  Abdomen: Soft, non-distended, non-tender to palpation.  Sensory: Intact to light touch throughout.  Motor Strength: Moves all extremities spontaneously with good tone. No abnormal movements seen.      Strength   Deltoids Triceps Biceps Wrist Extension Wrist Flexion Hand    Upper: R 5/5 5/5 5/5 5/5 5/5 5/5     L 5/5 5/5 5/5 5/5 5/5 5/5       Hip Flexion Knee Extension Knee  Flexion Dorsiflexion Plantar flexion EHL   Lower: R 5/5 5/5 5/5 5/5 5/5 5/5     L 4/5 4/5 4/5 5/5 5/5 5/5      Parker: Absent.  Clonus: 3 beats bilaterally.  Skin: Skin is warm, dry and intact.     Incision c/d/i with staples. No surrounding erythema or edema. No drainage from incision. No palpable hematoma or underlying fluid collection.     HV drains in place.    Significant Labs:  Recent Labs    Lab 03/02/22  0424 03/02/22  1423 03/03/22  0232   * 125* 115*    137 137   K 3.7 4.1 4.4    104 107   CO2 26 26 24   BUN 16 15 16   CREATININE 0.6 0.6 0.5   CALCIUM 7.6* 7.7* 7.8*   MG  --   --  1.6     Recent Labs   Lab 03/02/22  0424 03/02/22  0933 03/02/22  1209 03/02/22  1423 03/03/22  0232   WBC 1.46*  --   --  4.60 2.91*   HGB 7.4*  --   --  8.6* 7.8*   HCT 23.9*   < > 24* 27.2* 24.0*   PLT 72*  --   --  92* 69*    < > = values in this interval not displayed.     Recent Labs   Lab 03/02/22  0424 03/03/22  0232   INR 1.3* 1.3*     Microbiology Results (last 7 days)       Procedure Component Value Units Date/Time    Culture, Anaerobe [[id number]] Collected: 03/02/22 1228    Order Status: Completed Specimen: Back Updated: 03/03/22 0714     Anaerobic Culture Culture in progress    Narrative:      L5/S1 DISC    Aerobic culture [[id number]] Collected: 03/02/22 1228    Order Status: Completed Specimen: Back Updated: 03/03/22 0634     Aerobic Bacterial Culture No growth    Narrative:      L5/S1 DISC    Fungus culture [[id number]] Collected: 02/21/22 1539    Order Status: Completed Specimen: Abscess from Back Updated: 03/02/22 1435     Fungus (Mycology) Culture Culture in progress    Narrative:      Epidural purulence #1    Fungus culture [[id number]] Collected: 02/21/22 1539    Order Status: Completed Specimen: Abscess from Back Updated: 03/02/22 1435     Fungus (Mycology) Culture Culture in progress    Narrative:      Epidural purulence #2    Gram stain [[id number]] Collected: 03/02/22 1228    Order Status: Completed Specimen: Back Updated: 03/02/22 1423     Gram Stain Result No WBC's      No organisms seen    Narrative:      L5/S1 DISC    AFB Culture & Smear [[id number]] Collected: 03/02/22 1228    Order Status: Sent Specimen: Back Updated: 03/02/22 1400    Fungus culture [[id number]] Collected: 03/02/22 1228    Order Status: Sent Specimen: Back Updated: 03/02/22 1251    Fungus culture [[id number]]  Collected: 02/15/22 1128    Order Status: Completed Specimen: Biopsy from Back Updated: 03/02/22 1028     Fungus (Mycology) Culture Culture in progress      No fungus isolated after 2 weeks    Narrative:      L3-4 / L4-5 osteodiscitis    Aerobic culture [[id number]] Collected: 02/21/22 1539    Order Status: Completed Specimen: Abscess from Back Updated: 02/25/22 0957     Aerobic Bacterial Culture No growth    Narrative:      Epidural purulence #1    Culture, Anaerobe [[id number]] Collected: 02/21/22 1539    Order Status: Completed Specimen: Abscess from Back Updated: 02/25/22 0728     Anaerobic Culture No anaerobes isolated    Narrative:      Epidural purulence #2    Culture, Anaerobe [[id number]] Collected: 02/21/22 1539    Order Status: Completed Specimen: Abscess from Back Updated: 02/25/22 0728     Anaerobic Culture No anaerobes isolated    Narrative:      Epidural purulence #1    Aerobic culture [[id number]] Collected: 02/21/22 1539    Order Status: Completed Specimen: Abscess from Back Updated: 02/24/22 1124     Aerobic Bacterial Culture No growth    Narrative:      Epidural purulence #2          All pertinent labs from the last 24 hours have been reviewed.    Significant Diagnostics:  I have reviewed and interpreted all pertinent imaging results/findings within the past 24 hours.I

## 2022-03-03 NOTE — PROGRESS NOTES
[facility]  Neurocritical Care  Progress Note    Admit Date: 2/14/2022  Service Date: 03/03/2022  Length of Stay: 17    Subjective:     Chief Complaint: Spondylodiscitis    History of Present Illness: Ms. Zazueta is a 40 yo F PMH cirrhosis, Hep C, pancytopenia, polysubstance abuse, epidural abscess s/p L3-L4 laminectomy and L3-L5 TLIF (4/2021; blood and surgical cultures positive for group B strep) who is admitted to the neuro ICU after hardware removal and washout. Per chart review patient had worsening back and left leg pain since October. The left leg pain is a shocking pain if she stands for 10 minutes. Also reports new weakness in the legs. Denies bowel/bladder dsyfunction or neck pain. Uses a walker to ambulate at home. Endorses cigarette and marijuana use denies Iv drug use for 2 years. Patient was on vanc and ceftriaxone prior to the surgery. She required platelets prior to the procedure, PRBCs intraoperatively and estimated blood loss 1L. On arrival to the neuro ICU she was hypotensive requiring levo and in pain.    Patient was s/d on 2/22/22 to [facility] floor and she was doing well, team managing pain and per notes neuro exam with 4/5 strength LLE and rest normal. She went to OR today for T10-pelvis fusion and L5-S1 TLIF, no complications. Had a 500cc blood loss requiring transfusion and dural tare. Patient arrived to [facility] c/o pain, but moving all 4 extremities, HD stable. Two drains in place.  Admitting for closer monitoring.       Hospital Course: 03/02/2022: CT lumbar spine suspicious for hardware infection. Patient started on vanc and ceftriaxone. Hardware removal and washout with neurosurgery 2/22. Estimated 1L blood loss intraoperatively and required 1 unit PRBCs intraoperatively. Was on levo for Pain control with IV dilaudid and PO oxycodone. Awaiting post op xray imaging.  03/03/2022: 2 u platelets, 1 u prbc. Follow up CBC pending. ID consult. Transfer to floor today.          Interval History:  See above.     Review of Systems   Constitutional:  Negative for fatigue.   Gastrointestinal:  Negative for diarrhea and vomiting.   Musculoskeletal:  Positive for back pain. Negative for gait problem.   Skin:  Negative for wound.     Objective:     Vitals:  Temp: 98.4 °F (36.9 °C)  Pulse: 108  Rhythm: sinus tachycardia  BP: 116/62  MAP (mmHg): 83  Resp: 14  SpO2: 98 %  O2 Device (Oxygen Therapy): room air    Temp  Min: 96.9 °F (36.1 °C)  Max: 99 °F (37.2 °C)  Pulse  Min: 87  Max: 117  BP  Min: 108/58  Max: 169/87  MAP (mmHg)  Min: 75  Max: 114  Resp  Min: 12  Max: 31  SpO2  Min: 95 %  Max: 100 %    03/02 0701 - 03/03 0700  In: 3432.3 [P.O.:486; I.V.:70]  Out: 2475 [Urine:1625; Drains:350]   Unmeasured Output  Urine Occurrence: 1  Stool Occurrence: 1  Pad Count: 1       Physical Exam  Vitals and nursing note reviewed.   Constitutional:       Appearance: She is obese.   HENT:      Head: Normocephalic.      Nose: Nose normal.      Mouth/Throat:      Mouth: Mucous membranes are moist.   Eyes:      Extraocular Movements: Extraocular movements intact.      Pupils: Pupils are equal, round, and reactive to light.   Cardiovascular:      Rate and Rhythm: Normal rate.   Pulmonary:      Effort: No respiratory distress.   Abdominal:      General: There is distension.      Tenderness: There is no abdominal tenderness.   Musculoskeletal:         General: Normal range of motion.   Neurological:      Mental Status: She is alert.      GCS: GCS eye subscore is 4. GCS verbal subscore is 5. GCS motor subscore is 6.      Cranial Nerves: No cranial nerve deficit.      Sensory: No sensory deficit.      Motor: Weakness present.      Comments: Moving all 4 extremities spontaneously   W/D to pain to bl lower extremities. No saddle anesthesia noted          Medications:  Continuoussodium chloride 0.9%, Last Rate: 10 mL/hr at 02/27/22 0305    Scheduledacetaminophen, 1,000 mg, Q6H  cefTRIAXone (ROCEPHIN) IVPB, 2 g,  Q12H  LIDOcaine, 1 patch, Q24H  pantoprazole, 40 mg, Daily  pregabalin, 75 mg, TID  senna-docusate 8.6-50 mg, 1 tablet, Daily  sertraline, 50 mg, Daily  sodium chloride 0.9%, 10 mL, Q6H    PRNsodium chloride, , Q24H PRN  aluminum-magnesium hydroxide-simethicone, 30 mL, Q6H PRN  dextrose 10%, 12.5 g, PRN  dextrose 10%, 25 g, PRN  glucagon (human recombinant), 1 mg, PRN  glucose, 16 g, PRN  glucose, 24 g, PRN  HYDROmorphone, 2 mg, Q3H PRN  melatonin, 6 mg, Nightly PRN  oxyCODONE, 10 mg, Q4H PRN  oxyCODONE, 20 mg, Q4H PRN  polyethylene glycol, 17 g, Daily PRN  sodium chloride 0.9%, 10 mL, PRN      Today I personally reviewed pertinent medications, lines/drains/airways, imaging, cardiology results, laboratory results, microbiology results, notably:    Diet  Diet Adult Regular (IDDSI Level 7)  Diet Adult Regular (IDDSI Level 7)          Assessment/Plan:     Psychiatric  Mild episode of recurrent major depressive disorder  Continue home sertraline 50mg daily    ID  * Spondylodiscitis  Patient initially sent to the hospital from nsgy clinic for infected hardware.  Of note history of IVDU and TLIF of L3-L5 on 04/16/2021 for previous spinal abscess. This hospital admission MRI C/T/L spine showed suspected discitis/osteomyelitis at L3-4 and L4-5 with probable hardware infection initially admitted on 2/21 s/p washout and hardware removal with neurosurgery now re admitted s/p T10-pelvis fusion and L5-S1 TLIF     -- Continue ceftriaxone  -- Estimated 500L blood loss intraop, required 1L PRBCs intraoperatively  -- Monitor drain output, 2 in place open to gravity   -- ID following, will likely need 8 weeks of Abx   -- Pending CT spine   -- SBP <180 , MAP >65   -- Pain regimen: pregabalin increased to 75mg TID, added lidocaine patch, oxycodone 10/15 PRN (first choice) and dilaudid if needed         Hematology  Thrombocytopenia  2/2 liver cirrhosis   -- see pancytopenia     Oncology  Pancytopenia  Hx of bone marrow biopsy 2017 that  was nondiagnostic.      Recommendations:  - daily labs  - transfuse for Hgb <7  - transfuse for plt <10k or <50k with bleeding    3/3 2 u platelets for platelet count 69 and 1 uprbc. Follow up CBC pending.     GI  Chronic hepatitis C with cirrhosis  U/S abdomen with doppler showed cirrhosis and portal hypertension satisfactory Doppler evaluation   MELD-Na score: 11 at 3/3/2022  2:32 AM  MELD score: 11 at 3/3/2022  2:32 AM  Calculated from:  Serum Creatinine: 0.5 mg/dL (Using min of 1 mg/dL) at 3/3/2022  2:32 AM  Serum Sodium: 137 mmol/L at 3/3/2022  2:32 AM  Total Bilirubin: 1.5 mg/dL at 3/3/2022  2:32 AM  INR(ratio): 1.3 at 3/3/2022  2:32 AM  Age: 41 years    --daily CMP and INR  --will need hepatology referral at time of discharge  --Lipase normal    Other  Hyperbilirubinemia  Tbili 2.1 on admission.      Recommendations:  daily CMP, trend Tbili   Hepatology workup outpatient          The patient is being Prophylaxed for:  Venous Thromboembolism with: Mechanical  Stress Ulcer with: PPI  Ventilator Pneumonia with: not applicable    Activity Orders          Diet Adult Regular (IDDSI Level 7): Regular starting at 03/02 1840    Turn patient every 2 hours starting at 03/02 1600    Progressive Mobility Protocol (mobilize patient to their highest level of functioning at least twice daily) starting at 02/14 2000        Full Code     Level III    SILVANA CarboneC  Neurocritical Care  [facility]

## 2022-03-03 NOTE — PROGRESS NOTES
"[facility] - Neuro Critical Care  Adult Nutrition  Progress Note    SUMMARY       Recommendations    1. Suggest low sodium diet as tolerated.      2. If po intake <50%, recommend adding Optisource ONS BID.      3. RD following.     Goals: continue to consume % of meals by RD follow-up  Nutrition Goal Status: new  Communication of RD Recs:  (POC)    Assessment and Plan    Nutrition Problem  Increased Nutrient Needs: Protein      Related to (etiology):   Physiological demands       Signs and Symptoms (as evidenced by):   Spondylodiscitis     Interventions (treatment strategy):  Collaboration of nutrition care with other providers      Nutrition Diagnosis Status:   Continues      Reason for Assessment    Reason For Assessment: RD follow-up  Diagnosis:  (Spondylodiscitis)  Relevant Medical History: cirrhosis, Hep C, pancytopenia, polysubstance abuse, epidural abscess s/p L3-L4 laminectomy and L3-L5 TLIF (4/2021)  Interdisciplinary Rounds: did not attend  General Information Comments: S/p T10-Pelvis posterior instrumented fusion with L5-S1 TLIF. Pt continues with good po intake with no N/V. Visual NFPE completed 2/25, pt appears nourished.   Nutrition Discharge Planning: Low Sodium Diet    Nutrition Risk Screen    Nutrition Risk Screen: no indicators present    Nutrition/Diet History    Spiritual, Cultural Beliefs, Denominational Practices, Values that Affect Care: no  Factors Affecting Nutritional Intake: None identified at this time    Anthropometrics    Temp: 98.4 °F (36.9 °C)  Height Method: Stated  Height: 5' 7" (170.2 cm)  Height (inches): 67 in  Weight Method: Bed Scale  Weight: 133.8 kg (294 lb 15.6 oz)  Weight (lb): 294.98 lb  Ideal Body Weight (IBW), Female: 135 lb  % Ideal Body Weight, Female (lb): 218.5 %  BMI (Calculated): 46.2  BMI Grade: greater than 40 - morbid obesity     Lab/Procedures/Meds    Pertinent Labs Reviewed: reviewed  Pertinent Labs Comments: glucose 115, T bili 1.5, AST 58  Pertinent " Medications Reviewed: reviewed  Pertinent Medications Comments: acetaminophen, pantoprazole, senna-docusate, IVF     Estimated/Assessed Needs    Weight Used For Calorie Calculations: 133.8 kg (294 lb 15.6 oz)  Energy Calorie Requirements (kcal): 2035 kcal/day  Energy Need Method: [place]-St Jeor (no AF 2/2 obesity)  Protein Requirements: 107-134 gm/day (0.8-1.0 gm/kg)  Weight Used For Protein Calculations: 133.8 kg (294 lb 15.6 oz)  Fluid Requirements (mL): 1 mL/kcal or per MD     RDA Method (mL): 2035     Nutrition Prescription Ordered    Current Diet Order: Regular    Evaluation of Received Nutrient/Fluid Intake    I/O: -3.183L since 2/17  Comments: LBM 3/4  Tolerance: tolerating  % Intake of Estimated Energy Needs: Other: 75%  % Meal Intake: Other: 75%    Nutrition Risk    Level of Risk/Frequency of Follow-up:  (f/u 1 x wk)     Monitor and Evaluation    Food and Nutrient Intake: energy intake, food and beverage intake  Food and Nutrient Adminstration: diet order  Physical Activity and Function: nutrition-related ADLs and IADLs  Anthropometric Measurements: weight, weight change, body mass index  Biochemical Data, Medical Tests and Procedures: electrolyte and renal panel, gastrointestinal profile, glucose/endocrine profile, inflammatory profile, lipid profile  Nutrition-Focused Physical Findings: overall appearance     Nutrition Follow-Up    RD Follow-up?: Yes

## 2022-03-04 LAB
ALBUMIN SERPL BCP-MCNC: 2.1 G/DL (ref 3.5–5.2)
ALP SERPL-CCNC: 90 U/L (ref 55–135)
ALT SERPL W/O P-5'-P-CCNC: 22 U/L (ref 10–44)
ANION GAP SERPL CALC-SCNC: 5 MMOL/L (ref 8–16)
AST SERPL-CCNC: 48 U/L (ref 10–40)
BASOPHILS # BLD AUTO: 0.03 K/UL (ref 0–0.2)
BASOPHILS NFR BLD: 1 % (ref 0–1.9)
BILIRUB SERPL-MCNC: 1.3 MG/DL (ref 0.1–1)
BLD PROD TYP BPU: NORMAL
BLOOD UNIT EXPIRATION DATE: NORMAL
BLOOD UNIT TYPE CODE: 9500
BLOOD UNIT TYPE: NORMAL
BUN SERPL-MCNC: 17 MG/DL (ref 6–20)
CALCIUM SERPL-MCNC: 7.8 MG/DL (ref 8.7–10.5)
CHLORIDE SERPL-SCNC: 107 MMOL/L (ref 95–110)
CO2 SERPL-SCNC: 24 MMOL/L (ref 23–29)
CODING SYSTEM: NORMAL
CREAT SERPL-MCNC: 0.6 MG/DL (ref 0.5–1.4)
DIFFERENTIAL METHOD: ABNORMAL
DISPENSE STATUS: NORMAL
EOSINOPHIL # BLD AUTO: 0.1 K/UL (ref 0–0.5)
EOSINOPHIL NFR BLD: 4.5 % (ref 0–8)
ERYTHROCYTE [DISTWIDTH] IN BLOOD BY AUTOMATED COUNT: 16.3 % (ref 11.5–14.5)
EST. GFR  (AFRICAN AMERICAN): >60 ML/MIN/1.73 M^2
EST. GFR  (NON AFRICAN AMERICAN): >60 ML/MIN/1.73 M^2
GLUCOSE SERPL-MCNC: 115 MG/DL (ref 70–110)
HCT VFR BLD AUTO: 25 % (ref 37–48.5)
HGB BLD-MCNC: 8.1 G/DL (ref 12–16)
IMM GRANULOCYTES # BLD AUTO: 0.01 K/UL (ref 0–0.04)
IMM GRANULOCYTES NFR BLD AUTO: 0.3 % (ref 0–0.5)
INR PPP: 1.3 (ref 0.8–1.2)
LYMPHOCYTES # BLD AUTO: 0.6 K/UL (ref 1–4.8)
LYMPHOCYTES NFR BLD: 17.7 % (ref 18–48)
MAGNESIUM SERPL-MCNC: 1.6 MG/DL (ref 1.6–2.6)
MCH RBC QN AUTO: 30.2 PG (ref 27–31)
MCHC RBC AUTO-ENTMCNC: 32.4 G/DL (ref 32–36)
MCV RBC AUTO: 93 FL (ref 82–98)
MONOCYTES # BLD AUTO: 0.3 K/UL (ref 0.3–1)
MONOCYTES NFR BLD: 9 % (ref 4–15)
NEUTROPHILS # BLD AUTO: 2.1 K/UL (ref 1.8–7.7)
NEUTROPHILS NFR BLD: 67.5 % (ref 38–73)
NRBC BLD-RTO: 0 /100 WBC
PHOSPHATE SERPL-MCNC: 2.4 MG/DL (ref 2.7–4.5)
PLATELET # BLD AUTO: 77 K/UL (ref 150–450)
PMV BLD AUTO: 10.2 FL (ref 9.2–12.9)
POTASSIUM SERPL-SCNC: 4 MMOL/L (ref 3.5–5.1)
PROT SERPL-MCNC: 5.4 G/DL (ref 6–8.4)
PROTHROMBIN TIME: 13 SEC (ref 9–12.5)
RBC # BLD AUTO: 2.68 M/UL (ref 4–5.4)
SODIUM SERPL-SCNC: 136 MMOL/L (ref 136–145)
UNIT NUMBER: NORMAL
WBC # BLD AUTO: 3.1 K/UL (ref 3.9–12.7)

## 2022-03-04 PROCEDURE — 97110 THERAPEUTIC EXERCISES: CPT

## 2022-03-04 PROCEDURE — 25000003 PHARM REV CODE 250: Performed by: STUDENT IN AN ORGANIZED HEALTH CARE EDUCATION/TRAINING PROGRAM

## 2022-03-04 PROCEDURE — 85025 COMPLETE CBC W/AUTO DIFF WBC: CPT | Performed by: STUDENT IN AN ORGANIZED HEALTH CARE EDUCATION/TRAINING PROGRAM

## 2022-03-04 PROCEDURE — 83735 ASSAY OF MAGNESIUM: CPT | Performed by: PSYCHIATRY & NEUROLOGY

## 2022-03-04 PROCEDURE — 36430 TRANSFUSION BLD/BLD COMPNT: CPT

## 2022-03-04 PROCEDURE — 99233 SBSQ HOSP IP/OBS HIGH 50: CPT | Mod: ,,, | Performed by: INTERNAL MEDICINE

## 2022-03-04 PROCEDURE — P9035 PLATELET PHERES LEUKOREDUCED: HCPCS | Performed by: STUDENT IN AN ORGANIZED HEALTH CARE EDUCATION/TRAINING PROGRAM

## 2022-03-04 PROCEDURE — 97164 PT RE-EVAL EST PLAN CARE: CPT

## 2022-03-04 PROCEDURE — 25000003 PHARM REV CODE 250

## 2022-03-04 PROCEDURE — 84100 ASSAY OF PHOSPHORUS: CPT | Performed by: PSYCHIATRY & NEUROLOGY

## 2022-03-04 PROCEDURE — 80053 COMPREHEN METABOLIC PANEL: CPT

## 2022-03-04 PROCEDURE — 97168 OT RE-EVAL EST PLAN CARE: CPT

## 2022-03-04 PROCEDURE — 97530 THERAPEUTIC ACTIVITIES: CPT

## 2022-03-04 PROCEDURE — 63600175 PHARM REV CODE 636 W HCPCS: Performed by: STUDENT IN AN ORGANIZED HEALTH CARE EDUCATION/TRAINING PROGRAM

## 2022-03-04 PROCEDURE — 25000003 PHARM REV CODE 250: Performed by: NEUROLOGICAL SURGERY

## 2022-03-04 PROCEDURE — 85610 PROTHROMBIN TIME: CPT

## 2022-03-04 PROCEDURE — 20000000 HC ICU ROOM

## 2022-03-04 PROCEDURE — 63600175 PHARM REV CODE 636 W HCPCS: Performed by: PHYSICIAN ASSISTANT

## 2022-03-04 PROCEDURE — A4216 STERILE WATER/SALINE, 10 ML: HCPCS | Performed by: NEUROLOGICAL SURGERY

## 2022-03-04 PROCEDURE — C1751 CATH, INF, PER/CENT/MIDLINE: HCPCS

## 2022-03-04 PROCEDURE — 99233 PR SUBSEQUENT HOSPITAL CARE,LEVL III: ICD-10-PCS | Mod: ,,, | Performed by: INTERNAL MEDICINE

## 2022-03-04 RX ORDER — HYDROMORPHONE HYDROCHLORIDE 1 MG/ML
2 INJECTION, SOLUTION INTRAMUSCULAR; INTRAVENOUS; SUBCUTANEOUS
Status: DISCONTINUED | OUTPATIENT
Start: 2022-03-04 | End: 2022-03-09

## 2022-03-04 RX ORDER — HYDROCODONE BITARTRATE AND ACETAMINOPHEN 500; 5 MG/1; MG/1
TABLET ORAL
Status: DISCONTINUED | OUTPATIENT
Start: 2022-03-04 | End: 2022-03-08

## 2022-03-04 RX ORDER — NOREPINEPHRINE BITARTRATE/D5W 4MG/250ML
PLASTIC BAG, INJECTION (ML) INTRAVENOUS
Status: DISPENSED
Start: 2022-03-04 | End: 2022-03-05

## 2022-03-04 RX ADMIN — Medication 10 ML: at 05:03

## 2022-03-04 RX ADMIN — OXYCODONE HYDROCHLORIDE 10 MG: 10 TABLET ORAL at 09:03

## 2022-03-04 RX ADMIN — OXYCODONE HYDROCHLORIDE 20 MG: 10 TABLET ORAL at 01:03

## 2022-03-04 RX ADMIN — POLYETHYLENE GLYCOL 3350 17 G: 17 POWDER, FOR SOLUTION ORAL at 08:03

## 2022-03-04 RX ADMIN — PREGABALIN 75 MG: 75 CAPSULE ORAL at 03:03

## 2022-03-04 RX ADMIN — Medication 10 ML: at 11:03

## 2022-03-04 RX ADMIN — Medication 10 ML: at 12:03

## 2022-03-04 RX ADMIN — ACETAMINOPHEN 1000 MG: 325 TABLET ORAL at 12:03

## 2022-03-04 RX ADMIN — PREGABALIN 75 MG: 75 CAPSULE ORAL at 09:03

## 2022-03-04 RX ADMIN — OXYCODONE HYDROCHLORIDE 10 MG: 10 TABLET ORAL at 08:03

## 2022-03-04 RX ADMIN — OXYCODONE HYDROCHLORIDE 20 MG: 10 TABLET ORAL at 05:03

## 2022-03-04 RX ADMIN — ACETAMINOPHEN 1000 MG: 325 TABLET ORAL at 05:03

## 2022-03-04 RX ADMIN — Medication 10 ML: at 06:03

## 2022-03-04 RX ADMIN — ACETAMINOPHEN 1000 MG: 325 TABLET ORAL at 06:03

## 2022-03-04 RX ADMIN — SERTRALINE HYDROCHLORIDE 50 MG: 50 TABLET ORAL at 08:03

## 2022-03-04 RX ADMIN — PANTOPRAZOLE SODIUM 40 MG: 20 TABLET, DELAYED RELEASE ORAL at 08:03

## 2022-03-04 RX ADMIN — OXYCODONE HYDROCHLORIDE 20 MG: 10 TABLET ORAL at 12:03

## 2022-03-04 RX ADMIN — ACETAMINOPHEN 1000 MG: 325 TABLET ORAL at 11:03

## 2022-03-04 RX ADMIN — LIDOCAINE 1 PATCH: 50 PATCH CUTANEOUS at 03:03

## 2022-03-04 RX ADMIN — PREGABALIN 75 MG: 75 CAPSULE ORAL at 08:03

## 2022-03-04 RX ADMIN — CEFTRIAXONE 2 G: 2 INJECTION, POWDER, FOR SOLUTION INTRAMUSCULAR; INTRAVENOUS at 02:03

## 2022-03-04 RX ADMIN — HYDROMORPHONE HYDROCHLORIDE 2 MG: 1 INJECTION, SOLUTION INTRAMUSCULAR; INTRAVENOUS; SUBCUTANEOUS at 02:03

## 2022-03-04 NOTE — PLAN OF CARE
PT re-eval complete and POC established.    3/4/2022    Problem: Physical Therapy Goal  Goal: Physical Therapy Goal  Description: Goals to be met by: 3/18/2022     Patient will increase functional independence with mobility by performin. Supine to sit with Supervision  2. Sit to supine with Supervision  3. Rolling to Left and Right with Supervision  4. Sit to stand transfer with Stand-by Assistance and RW  5. Bed to chair transfer with Minimal Assistance using Rolling Walker    Outcome: Ongoing, Progressing

## 2022-03-04 NOTE — PT/OT/SLP RE-EVAL
Occupational Therapy   Co Re-Evaluation/Treatment w/ PT    Patient Name:  Rachel Zazueta   MRN:  [id number]  Admit Date: 2/14/2022  Admitting Diagnosis:  Spondylodiscitis   Length of Stay: 18 days  Recent Surgery: Procedure(s) (LRB):  FUSION, SPINE, LUMBAR (Bilateral) 2 Days Post-Op    Hospital Course:  2/14/2022: Admit date  2/22/2022: OT initial evaluation date  3/4/2022: Re-evaluation date  Reason for re-evaluation: s/p spinal fusion    Procedure(s):  FUSION, SPINE, LUMBAR     Please refer to OT initial evaluation for PLOF, OP and social history.  Recommendations:     Discharge Recommendations: rehabilitation facility  Discharge Equipment Recommendations:   (TBD)  Barriers to discharge:  Inaccessible home environment (increased assistance needed)    Assessment:     Rachel Zazueta is a 41 y.o. female with a medical diagnosis of Spondylodiscitis.  She presents with performance deficits affecting function are weakness, impaired endurance, impaired self care skills, impaired functional mobilty, gait instability, impaired balance, decreased lower extremity function, decreased safety awareness, pain, orthopedic precautions, decreased ROM.  Pt would benefit from skilled OT services in order to maximize independence with ADLs and facilitate safe discharge. Pt's clinical condition meets full inpatient rehab criteria. Inpatient rehab will provide to total interdisciplinary treatment approach needed. Pt is at high risk of unplanned readmission due to fall risk. The lower level of care cannot provide total interdisciplinary approach needed. Because of patient's significant decline from PLOF, patient would benefit from Inpatient Rehab to maximize return to PLOF. Pt is an excellent candidate for inpatient rehabilitation, as he has a qualifying diagnosis, displays good activity tolerance, has experienced decline from PLOF, has good family support, and is motivated to participate in therapy. Patient found this date w/o TLSO  "in room, informed by patient s.o. took home; called placed to NSGY and received clearance for EOB/OOB STS and short steps this date for re-evaluation    Time spent evaluating pt, performing bed mobility, EOB activity, and STS w/ short ambulation    Relevant hx and current limitations:   Spinal precautions -TLSO    Rehab Prognosis: Good; patient would benefit from acute skilled OT services to address these deficits and reach maximum level of function.       Plan:     Patient to be seen 3 x/week to address the above listed problems via self-care/home management, therapeutic activities, therapeutic exercises, neuromuscular re-education  · Plan of Care Expires: 03/21/22  · Plan of Care Reviewed with: patient    Subjective     Communicated with RAUL Hurtado prior to session.  Patient found HOB elevated upon OT entry to room, agreeable to re-evaluation.     Chief Complaint: Post-op Problem (Back surg in 2020, infection of hardware with failure, sent here for admission)    Patient comments/goals: be able to walk around in my house again    Pain/Comfort:  · Pain Rating 1: 8/10  · Location - Side 1: Left  · Location - Orientation 1: generalized  · Location 1: back  · Pain Addressed 1: Pre-medicate for activity, Reposition, Distraction    Objective:   Patient found with: bed alarm, blood pressure cuff, arterial line, central line, gu catheter, SCD, hemovac, wound vac, pulse ox (continuous), telemetry     General Precautions: Standard, fall   Orthopedic Precautions:spinal precautions   Braces: TLSO   Oxygen Device: Room Air  Vitals: /68 (BP Location: Left arm, Patient Position: Lying)   Pulse 97   Temp 98 °F (36.7 °C) (Oral)   Resp 16   Ht 5' 7" (1.702 m)   Wt 133.8 kg (294 lb 15.6 oz)   LMP  (LMP Unknown)   SpO2 98%   Breastfeeding No   BMI 46.20 kg/m²     Cognitive and Psychosocial Function:   · AxOx4 -- Person, Place, Time and Situation   · Follows Commands/attention: follows multi-step " commands  · Communication: clear/fluent  · Memory: No Deficits noted  · Safety awareness/insight to disability: intact     Hearing: Intact    Vision:  Intact visual fields    Physical Exam:     Left UE Right UE   UE Edema N/A N/A   UE ROM AROM: WFL AROM: WFL   UE Strength WFL WFL    Strength WFL WFL   Sensation normal normal   Fine Motor Coordination:  Intact Intact   Gross Motor Coordination: Intact Intact       Occupational Performance:  Bed Mobility:       Rolling right: minimum assistance   Scooting: towards EOB with contact guard assistance   Supine to Sit: minimum assistance   Sit to Supine: minimum assistance    Functional Mobility/Transfers:     Sit to Stand: minimum assistance using hand-held assist    Pt ambulated 5 steps fwd/bwd and 3 lateral steps Min A w/ B HHA to simulate household and/or community distances in order to maximize functional activity tolerance and dynamic standing balance required for engagement in occupations of choice.   o LOB: No  o SOB: No  o Dizziness: No    Activities of Daily Living:     Lower Body Dressing: total assistance to don/doff socks HOB elevated    AMPAC 6 Click ADL:  AMPAC Total Score: 15    Treatment & Education:   Therapist provided facilitation and instruction of proper body mechanics, energy conservation, and fall prevention strategies during tasks listed above.   Pt and family re-educated on role of OT, dicussed updates to POC and goals for therapy   Pt and family educated on importance of OOB activities with staff member assistance and sitting OOB majority of the day.    Updated communication board with level of assist required (Min A stand pivot) & educated RN/patient that pt is appropriate for transfers and mobility with RN/PCT.     Additional staff present: PT for co-eval/tx due to patient's medical complexities requiring two skilled therapists in order to appropriately assess patient's functional deficits as well as ensure patient safety,  accommodate for limited activity tolerance, and provide appropriate, skilled assistance to maximize functional potential during evaluation.    Patient left HOB elevated with all lines intact, call button in reach, bed alarm on, RN notified and RN present    GOALS:   Multidisciplinary Problems     Occupational Therapy Goals        Problem: Occupational Therapy Goal    Goal Priority Disciplines Outcome Interventions   Occupational Therapy Goal     OT, PT/OT Ongoing, Progressing    Description: Goals to be met by: 3/22/2022     Patient will increase functional independence with ADLs by performing:    Feeding with [place].  UE Dressing with Stand-by Assistance.  LE Dressing with Stand-by Assistance.  Grooming while standing at sink with Set-up Assistance.  Toileting from toilet with Stand-by Assistance for hygiene and clothing management.   Toilet transfer to toilet with Contact Guard Assistance.  Pt able to verbalize and adhere to all spinal precautions 100% of the time.  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9    CHOLECYSTECTOMY      KIDNEY SURGERY      LUMBAR FUSION Bilateral 3/2/2022    Procedure: FUSION, SPINE, LUMBAR;  Surgeon: Guille Templeton MD;  Location: [address];  Service: Neurosurgery;  Laterality: Bilateral;  AIRO  T10--Pelvis    REMOVAL OF HARDWARE FROM SPINE N/A 2/21/2022    Procedure: REMOVAL, HARDWARE, SPINE;  Surgeon: Guille Templeton MD;  Location: [address];  Service: Neurosurgery;  Laterality: N/A;  [place]ashout       Time Tracking:     OT Date of Treatment: 03/04/22  OT Start Time: 1051  OT Stop Time: 1109  OT Total Time (min): 18 min    Additional staff present: PT    Billable Minutes:  Re-eval  10  Therapeutic Activity 8      Zonia (Ali), OTR/L  [phone] (Pager #)  3/4/2022

## 2022-03-04 NOTE — PLAN OF CARE
Problem: Occupational Therapy Goal  Goal: Occupational Therapy Goal  Description: Goals to be met by: 3/22/2022     Patient will increase functional independence with ADLs by performing:    Feeding with [place].  UE Dressing with Stand-by Assistance.  LE Dressing with Stand-by Assistance.  Grooming while standing at sink with Set-up Assistance.  Toileting from toilet with Stand-by Assistance for hygiene and clothing management.   Toilet transfer to toilet with Contact Guard Assistance.  Pt able to verbalize and adhere to all spinal precautions 100% of the time.  Pt can don/doff TLSO brace stand-by assist      Outcome: Ongoing, Progressing     Re-evaluation complete, please see note for details. Goals have been updated, continue POC

## 2022-03-04 NOTE — PROGRESS NOTES
[facility] - Neuro Critical Care  Neurocritical Care  Progress Note    Admit Date: 2/14/2022  Service Date: 03/04/2022  Length of Stay: 18    Subjective:     Chief Complaint: Spondylodiscitis    History of Present Illness: Ms. Zazueta is a 40 yo F PMH cirrhosis, Hep C, pancytopenia, polysubstance abuse, epidural abscess s/p L3-L4 laminectomy and L3-L5 TLIF (4/2021; blood and surgical cultures positive for group B strep) who is admitted to the neuro ICU after hardware removal and washout. Per chart review patient had worsening back and left leg pain since October. The left leg pain is a shocking pain if she stands for 10 minutes. Also reports new weakness in the legs. Denies bowel/bladder dsyfunction or neck pain. Uses a walker to ambulate at home. Endorses cigarette and marijuana use denies Iv drug use for 2 years. Patient was on vanc and ceftriaxone prior to the surgery. She required platelets prior to the procedure, PRBCs intraoperatively and estimated blood loss 1L. On arrival to the neuro ICU she was hypotensive requiring levo and in pain.    Patient was s/d on 2/22/22 to [facility] floor and she was doing well, team managing pain and per notes neuro exam with 4/5 strength LLE and rest normal. She went to OR today for T10-pelvis fusion and L5-S1 TLIF, no complications. Had a 500cc blood loss requiring transfusion and dural tare. Patient arrived to [facility] c/o pain, but moving all 4 extremities, HD stable. Two drains in place.  Admitting for closer monitoring.       Hospital Course: 03/02/2022: CT lumbar spine suspicious for hardware infection. Patient started on vanc and ceftriaxone. Hardware removal and washout with neurosurgery 2/22. Estimated 1L blood loss intraoperatively and required 1 unit PRBCs intraoperatively. Was on levo for Pain control with IV dilaudid and PO oxycodone. Awaiting post op xray imaging.  03/03/2022: 2 u platelets, 2 u prbc. Follow up CBC stable. ID consult. Transfer to floor today.   03/04/2022 2  u platelets by NSGY. Boarding for NSGY.       Interval History:  D/c chay, central line, and gu. Patient expresses subjective improvement in pain. See above.     Review of Systems   Constitutional:  Negative for fatigue.   Gastrointestinal:  Negative for diarrhea and vomiting.   Musculoskeletal:  Positive for back pain. Negative for gait problem.   Skin:  Negative for wound.     Objective:     Vitals:  Temp: 98 °F (36.7 °C)  Pulse: 96  Rhythm: normal sinus rhythm  BP: (!) 118/56  MAP (mmHg): 80  Resp: (!) 22  SpO2: 97 %  O2 Device (Oxygen Therapy): room air    Temp  Min: 97.9 °F (36.6 °C)  Max: 98.4 °F (36.9 °C)  Pulse  Min: 91  Max: 111  BP  Min: 109/55  Max: 151/69  MAP (mmHg)  Min: 74  Max: 103  Resp  Min: 9  Max: 24  SpO2  Min: 96 %  Max: 100 %    03/03 0701 - 03/04 0700  In: 2919.1 [P.O.:1130; I.V.:239.5]  Out: 1205 [Urine:1015; Drains:190]   Unmeasured Output  Urine Occurrence: 1  Stool Occurrence: 0  Pad Count: 1       Physical Exam  Vitals and nursing note reviewed.   Constitutional:       Appearance: She is obese.   HENT:      Head: Normocephalic.      Nose: Nose normal.      Mouth/Throat:      Mouth: Mucous membranes are moist.   Eyes:      Extraocular Movements: Extraocular movements intact.      Pupils: Pupils are equal, round, and reactive to light.   Cardiovascular:      Rate and Rhythm: Normal rate.   Pulmonary:      Effort: No respiratory distress.   Abdominal:      General: There is distension.      Tenderness: There is no abdominal tenderness.   Musculoskeletal:         General: Normal range of motion.   Neurological:      Mental Status: She is alert.      GCS: GCS eye subscore is 4. GCS verbal subscore is 5. GCS motor subscore is 6.      Cranial Nerves: No cranial nerve deficit.      Sensory: No sensory deficit.      Motor: Weakness present.      Comments: Moving all 4 extremities spontaneously, 5/5 strength throughout  W/D to pain to bl lower extremities.   No saddle anesthesia noted.           Medications:  Continuoussodium chloride 0.9%, Last Rate: Stopped (03/03/22 1708)    Scheduledacetaminophen, 1,000 mg, Q6H  cefTRIAXone (ROCEPHIN) IVPB, 2 g, Q12H  LIDOcaine, 1 patch, Q24H  pantoprazole, 40 mg, Daily  pregabalin, 75 mg, TID  senna-docusate 8.6-50 mg, 1 tablet, Daily  sertraline, 50 mg, Daily  sodium chloride 0.9%, 10 mL, Q6H    PRNsodium chloride, , Q24H PRN  aluminum-magnesium hydroxide-simethicone, 30 mL, Q6H PRN  dextrose 10%, 12.5 g, PRN  dextrose 10%, 25 g, PRN  glucagon (human recombinant), 1 mg, PRN  glucose, 16 g, PRN  glucose, 24 g, PRN  HYDROmorphone, 2 mg, Q3H PRN  melatonin, 6 mg, Nightly PRN  oxyCODONE, 10 mg, Q4H PRN  oxyCODONE, 20 mg, Q4H PRN  polyethylene glycol, 17 g, Daily PRN  sodium chloride 0.9%, 10 mL, PRN      Today I personally reviewed pertinent medications, lines/drains/airways, imaging, cardiology results, laboratory results, microbiology results, notably:    Diet  Diet Adult Regular (IDDSI Level 7)  Diet Adult Regular (IDDSI Level 7)        Assessment/Plan:     Psychiatric  Mild episode of recurrent major depressive disorder  Continue home sertraline 50mg daily    ID  * Spondylodiscitis  Patient initially sent to the hospital from nsgy clinic for infected hardware.  Of note history of IVDU and TLIF of L3-L5 on 04/16/2021 for previous spinal abscess. This hospital admission MRI C/T/L spine showed suspected discitis/osteomyelitis at L3-4 and L4-5 with probable hardware infection initially admitted on 2/21 s/p washout and hardware removal with neurosurgery now re admitted s/p T10-pelvis fusion and L5-S1 TLIF     -- Continue ceftriaxone  -- Estimated 500L blood loss intraop, required 1L PRBCs intraoperatively  -- Monitor drain output, 2 in place open to gravity   -- ID following, will likely need 8 weeks of Abx   -- CT spine  -- SBP <180 , MAP >65   -- Pain regimen: pregabalin increased to 75mg TID, added lidocaine patch, oxycodone 10/15 PRN (first choice) and dilaudid  if needed   -- ID following        Hematology  Thrombocytopenia  2/2 liver cirrhosis   -- see pancytopenia     Oncology  Pancytopenia  Hx of bone marrow biopsy 2017 that was nondiagnostic.      Recommendations:  - daily labs  - transfuse for Hgb <7  - transfuse for plt <10k or <50k with bleeding    3/4 2 u platelets per NSGY    GI  Chronic hepatitis C with cirrhosis  U/S abdomen with doppler showed cirrhosis and portal hypertension satisfactory Doppler evaluation   MELD-Na score: 10 at 3/4/2022 12:09 AM  MELD score: 10 at 3/4/2022 12:09 AM  Calculated from:  Serum Creatinine: 0.6 mg/dL (Using min of 1 mg/dL) at 3/4/2022 12:09 AM  Serum Sodium: 136 mmol/L at 3/4/2022 12:09 AM  Total Bilirubin: 1.3 mg/dL at 3/4/2022 12:09 AM  INR(ratio): 1.3 at 3/4/2022 12:09 AM  Age: 41 years    --daily CMP and INR  --will need hepatology referral at time of discharge  --Lipase normal    Other  Hyperbilirubinemia  Tbili 2.1 on admission.      Recommendations:  daily CMP, trend Tbili   Hepatology workup outpatient          The patient is being Prophylaxed for:  Venous Thromboembolism with: Mechanical  Stress Ulcer with: None  Ventilator Pneumonia with: not applicable    Activity Orders          Diet Adult Regular (IDDSI Level 7): Regular starting at 03/02 1840    Turn patient every 2 hours starting at 03/02 1600    Progressive Mobility Protocol (mobilize patient to their highest level of functioning at least twice daily) starting at 02/14 2000        Full Code     Level III    SILVANA CarobneC  Neurocritical Care  [facility] - Neuro Critical Care

## 2022-03-04 NOTE — PT/OT/SLP RE-EVAL
Physical Therapy [id number] and Co-Treatment    Patient Name:  Rachel Zazueta   MRN:  [id number]    Recommendations:     Discharge Recommendations:  rehabilitation facility   Discharge Equipment Recommendations: other (see comments) (TBD by next level of care)   Barriers to discharge: Increased level of assist and Inaccessible home    Assessment:     Rachel Zazueta is a 41 y.o. female admitted with a medical diagnosis of Spondylodiscitis.  She presents with the following impairments/functional limitations:  weakness, impaired endurance, impaired self care skills, impaired functional mobilty, gait instability, impaired balance, decreased lower extremity function, decreased safety awareness, pain, orthopedic precautions. These deficits affect their roles and responsibilities in which they were able to complete prior to admit. Rachel Zazueta would benefit from acute PT intervention to improve quality of life and focus on recovery of impairments. Once medically stable, recommending pt discharge to Inpatient Rehabilitation Facility. Pt tolerated session well. Pt reports improved pain after second surgery. Pt eager to maximize independence and safely DC home after IRF.    Rehab Prognosis: Good; patient would benefit from acute skilled PT services 3 x/week to address these deficits and reach maximum level of function. Patient is most appropriate to go to rehabilitation facility.  Recent Surgery: Procedure(s) (LRB):  FUSION, SPINE, LUMBAR (Bilateral) 2 Days Post-Op    Plan:     During this hospitalization, patient to be seen 3 x/week to address the identified rehab impairments via gait training, therapeutic activities, therapeutic exercises, neuromuscular re-education and progress toward the following goals:    · Plan of Care Expires:  03/24/22    Subjective     Chief Complaint: back pain  Patient/Family Comments/Goals: maximize independence and function while decreasing pain  Pain/Comfort:  · Pain Rating 1:  8/10  · Location 1: back  · Pain Rating Post-Intervention 1: other (see comments) (not rated)    Patients cultural, spiritual, Pentec[employer]alism conflicts given the current situation: no    Objective:     Communicated with nursing prior to session.  Patient found HOB elevated with telemetry, peripheral IV, hemovac, arterial line, bed alarm, blood pressure cuff, central line, gu catheter, pulse ox (continuous), SCD, wound vac upon PT entry to room.    General Precautions: Standard, fall   Orthopedic Precautions:spinal precautions   Braces: TLSO    Exams:  · Cognitive Exam: Patient is oriented to Person, Place, Time, Situation, follows commands 100% of the time  · RLE ROM: WFL  · RLE Strength: WFL, at least 3+/5 observed  · LLE ROM: WFL  · LLE Strength: WFL, at least 3+/5 observed  · Sensation: Intact light touch to BLEs  · Coordination: Heel to shin WFL bilaterally, but L slightly harder than R    Functional Mobility:  · Bed Mobility:     · Rolling Right: minimum assistance  · Scooting: contact guard assistance  · Supine to Sit: minimum assistance  · Sit to Supine: minimum assistance  · Transfers:     · Sit to Stand: minimum assistance with B hand-held assist  · Gait: Patient ambulated 5 steps forward/backward and 3 steps R with Yefri and B HHA and no AD. Patient demonstrates occasional unsteady gait, decreased step length, wide base of support, decreased foot clearance and decreased arm swing. Reciprocal gait pattern noted. All lines remained intact throughout ambulation trial.  · Balance:   · Static Sitting: Stand-By Assist   · Dynamic Sitting: SBA-Contact-Guard Assist   · Static Standing: Min Assist   · Dynamic Standing: Min Assist     Therapeutic Activities and Exercises:  Patient educated on role of therapy, goals of session, and benefits of mobilizing.   Discussed PT plan of care during hospitalization.   Patient educated on calling for assistance.   Patient educated on how their diagnosis impacts their mobility  within PT scope of practice.   Communication board up to date.  All questions answered within PT scope of practice.    AM-PAC 6 CLICK MOBILITY  Turning over in bed (including adjusting bedclothes, sheets and blankets)?: 3  Sitting down on and standing up from a chair with arms (e.g., wheelchair, bedside commode, etc.): 3  Moving from lying on back to sitting on the side of the bed?: 3  Moving to and from a bed to a chair (including a wheelchair)?: 3  Need to walk in hospital room?: 3  Climbing 3-5 steps with a railing?: 1  Basic Mobility Total Score: 16     Patient left HOB elevated with all lines intact, call button in reach, bed alarm on and RN present.    GOALS:   Multidisciplinary Problems     Physical Therapy Goals        Problem: Physical Therapy Goal    Goal Priority Disciplines Outcome Goal Variances Interventions   Physical Therapy Goal     PT, PT/OT Ongoing, Progressing     Description: Goals to be met by: 3/18/2022     Patient will increase functional independence with mobility by performin. Supine to sit with Supervision  2. Sit to supine with Supervision  3. Rolling to Left and Right with Supervision  4. Sit to stand transfer with Stand-by Assistance and RW  5. Bed to chair transfer with Minimal Assistance using Rolling Walker                     History:     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9    CHOLECYSTECTOMY      KIDNEY SURGERY      LUMBAR FUSION Bilateral 3/2/2022    Procedure: FUSION, SPINE, LUMBAR;  Surgeon: Guille Templeton MD;  Location: [facility] OR [address] [place] [address];  Service: Neurosurgery;  Laterality: Bilateral;  AIRO  T10--Pelvis    REMOVAL OF HARDWARE FROM SPINE N/A 2022    Procedure: REMOVAL, HARDWARE, SPINE;  Surgeon: Guille Templeton MD;  Location: [facility] OR [address] [place] [address];   Service: Neurosurgery;  Laterality: N/A;  Washout       Time Tracking:     PT Received On: 03/04/22  PT Start Time: 1051     PT Stop Time: 1110  PT Total Time (min): 19 min     Billable Minutes:  Re-eval 10 and Therapeutic Exercise 8    03/04/2022    [id number] and co-treatment performed for this visit due to suspected patient need for two skilled therapists to ensure patient and staff safety and to accommodate for patient activity tolerance/pain management

## 2022-03-04 NOTE — ASSESSMENT & PLAN NOTE
U/S abdomen with doppler showed cirrhosis and portal hypertension satisfactory Doppler evaluation   MELD-Na score: 10 at 3/4/2022 12:09 AM  MELD score: 10 at 3/4/2022 12:09 AM  Calculated from:  Serum Creatinine: 0.6 mg/dL (Using min of 1 mg/dL) at 3/4/2022 12:09 AM  Serum Sodium: 136 mmol/L at 3/4/2022 12:09 AM  Total Bilirubin: 1.3 mg/dL at 3/4/2022 12:09 AM  INR(ratio): 1.3 at 3/4/2022 12:09 AM  Age: 41 years    --daily CMP and INR  --will need hepatology referral at time of discharge  --Lipase normal

## 2022-03-04 NOTE — ASSESSMENT & PLAN NOTE
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    Patient underwent L3-4 intervertebral disc biopsy on 2/15. She is s/p hardware removal on 2/21 though cages retained L3-4 and L4-5.  Patient was on empiric Vancomycin and Ceftriaxone - now rocephin. IR/OR cultures and blood cx are NGTD. Afebrile. HDS.  On high dose ceftriaxone given cultures from epidural space. Patient hardware re-implantation on march 2.    Plan    1. Continue ceftriaxone IV    2. Follow up on results of surgical cultures

## 2022-03-04 NOTE — ASSESSMENT & PLAN NOTE
Patient initially sent to the hospital from nsgy clinic for infected hardware.  Of note history of IVDU and TLIF of L3-L5 on 04/16/2021 for previous spinal abscess. This hospital admission MRI C/T/L spine showed suspected discitis/osteomyelitis at L3-4 and L4-5 with probable hardware infection initially admitted on 2/21 s/p washout and hardware removal with neurosurgery now re admitted s/p T10-pelvis fusion and L5-S1 TLIF     -- Continue ceftriaxone  -- Estimated 500L blood loss intraop, required 1L PRBCs intraoperatively  -- Monitor drain output, 2 in place open to gravity   -- ID following, will likely need 8 weeks of Abx   -- CT spine  -- SBP <180 , MAP >65   -- Pain regimen: pregabalin increased to 75mg TID, added lidocaine patch, oxycodone 10/15 PRN (first choice) and dilaudid if needed   -- ID following

## 2022-03-04 NOTE — PLAN OF CARE
[facility] Care Plan    POC reviewed with Rachel Zazueta at 0315. Pt verbalized understanding. Questions and concerns addressed. No acute events overnight. Pt progressing toward goals.     SBP maintained <180 w/o intervention.   Dilaudid x2; oxy x3 for back pain.   L drain output: 150 cc/shift  R drain output: 40 cc/shift.   AM H/H: 8.1/25.     Will continue to monitor. See below and flowsheets for full assessment and VS info.       Neuro:  [place] Coma Scale  Best Eye Response: 4-->(E4) spontaneous  Best Motor Response: 6-->(M6) obeys commands  Best Verbal Response: 5-->(V5) oriented  [place] Coma Scale Score: 15  Assessment Qualifiers: patient not sedated/intubated, no eye obstruction present  Pupil PERRLA: yes     24hr Temp:  [97.3 °F (36.3 °C)-98.6 °F (37 °C)]     CV:   Rhythm: sinus tachycardia  BP goals:   SBP < 180  MAP > 65    Resp:   O2 Device (Oxygen Therapy): room air       Plan: N/A    GI/:     Diet/Nutrition Received: regular  Last Bowel Movement: 03/01/22  Voiding Characteristics: urethral catheter (bladder)    Intake/Output Summary (Last 24 hours) at 3/4/2022 0326  Last data filed at 3/4/2022 0302  Gross per 24 hour   Intake 2872.45 ml   Output 1315 ml   Net 1557.45 ml     Unmeasured Output  Urine Occurrence: 1  Stool Occurrence: 0  Pad Count: 1    Labs/Accuchecks:  Recent Labs   Lab 03/04/22  0009   WBC 3.10*   RBC 2.68*   HGB 8.1*   HCT 25.0*   PLT 77*      Recent Labs   Lab 03/04/22  0009      K 4.0   CO2 24      BUN 17   CREATININE 0.6   ALKPHOS 90   ALT 22   AST 48*   BILITOT 1.3*      Recent Labs   Lab 03/04/22  0009   INR 1.3*    No results for input(s): CPK, CPKMB, TROPONINI, MB in the last 168 hours.    Electrolytes: No replacement orders  Accuchecks: none    Gtts:   sodium chloride 0.9% Stopped (03/03/22 1708)       LDA/Wounds:  Lines/Drains/Airways       Peripherally Inserted Central Catheter Line  Duration             PICC Double Lumen 02/22/22 1110 right basilic 9 days               Central Venous Catheter Line  Duration             Percutaneous Central Line Insertion/Assessment - Double Lumen  03/02/22 1006 right internal jugular 1 day              Drain  Duration                  Closed/Suction Drain 03/02/22 1208 Left;Superior Back Accordion 10 Fr. 1 day         Closed/Suction Drain 03/02/22 1208 Right;Superior Back Accordion 10 Fr. 1 day         Urethral Catheter 03/02/22 0910 Straight-tip;Silicone 16 Fr. 1 day              Arterial Line  Duration             Arterial Line 03/02/22 0828 Left Radial 1 day                  Wounds: Yes  Wound care consulted: No       Problem: Adult Inpatient Plan of Care  Goal: Plan of Care Review  Outcome: Ongoing, Progressing     Problem: Adult Inpatient Plan of Care  Goal: Optimal Comfort and Wellbeing  Outcome: Ongoing, Progressing     Problem: Skin Injury Risk Increased  Goal: Skin Health and Integrity  Outcome: Ongoing, Progressing     Problem: Impaired Wound Healing  Goal: Optimal Wound Healing  Outcome: Ongoing, Progressing

## 2022-03-04 NOTE — PROGRESS NOTES
[facility] - Neuro Critical Care  Neurosurgery  Progress Note    Subjective:     History of Present Illness: Rachel Zazueta is a 41 y.o.  female with PMHx of Hepatitis C, liver cirrhosis, pancytopenia, and polysubstance abuse, who presents to clinic for follow up with new imaging s/p L3 and L4 laminectomy for evacuation of epidural abscess and L3-L5 TLIF on 04/16/2021.     The pt c/o worsening back and left leg pain since October. She states that she is no longer using IV drugs. States only using marijuana. Describes the left leg pain as a shock down the leg when standing for 10 minutes. Denies taking any antibiotics. Endorses new weakness in the legs. Denies b/b dysfunction. Denies new neck pain. She ambulates with a walker at home. States that she smoke.    She presents to ED as direct admit from clinic.       Post-Op Info:  Procedure(s) (LRB):  FUSION, SPINE, LUMBAR (Bilateral)   2 Days Post-Op     Interval History: 3/4: POD 2, transfused platelets and PRBC yesterday, plt still low. Neuro stable. Drain and WV in place till POD 5    Medications:  Continuous Infusions:   sodium chloride 0.9% Stopped (03/03/22 1708)     Scheduled Meds:   acetaminophen  1,000 mg Oral Q6H    cefTRIAXone (ROCEPHIN) IVPB  2 g Intravenous Q12H    LIDOcaine  1 patch Transdermal Q24H    pantoprazole  40 mg Oral Daily    pregabalin  75 mg Oral TID    senna-docusate 8.6-50 mg  1 tablet Oral Daily    sertraline  50 mg Oral Daily    sodium chloride 0.9%  10 mL Intravenous Q6H     PRN Meds:aluminum-magnesium hydroxide-simethicone, dextrose 10%, dextrose 10%, glucagon (human recombinant), glucose, glucose, HYDROmorphone, melatonin, oxyCODONE, oxyCODONE, polyethylene glycol, Flushing PICC Protocol **AND** sodium chloride 0.9% **AND** sodium chloride 0.9%     Objective:     Weight: 133.8 kg (294 lb 15.6 oz)  Body mass index is 46.2 kg/m².  Vital Signs (Most Recent):  Temp: 97.9 °F (36.6 °C) (03/04/22 0705)  Pulse: 103 (03/04/22  1105)  Resp: 19 (03/04/22 1105)  BP: (!) 116/59 (03/04/22 1000)  SpO2: 100 % (03/04/22 1105) Vital Signs (24h Range):  Temp:  [97.9 °F (36.6 °C)-98.4 °F (36.9 °C)] 97.9 °F (36.6 °C)  Pulse:  [] 103  Resp:  [9-24] 19  SpO2:  [96 %-100 %] 100 %  BP: (109-151)/(55-85) 116/59  Arterial Line BP: ()/() 176/105                Resp Rate Total:  [11 br/min-23 br/min] 23 br/min         Closed/Suction Drain 02/21/22 1644 Left;Medial Back Accordion 10 Fr. (Active)   Site Description Healing 02/28/22 2053   Dressing Type Other (Comment) 02/28/22 2053   Dressing Status Intact 02/27/22 2048   Dressing Intervention Integrity maintained 02/28/22 1800   Drainage Serosanguineous 02/28/22 2053   Status To bulb suction 02/28/22 2053   Output (mL) 100 mL 03/01/22 0500            Closed/Suction Drain 02/21/22 1645 Right;Medial Back Accordion 10 Fr. (Active)   Site Description Healing 02/28/22 2053   Dressing Type Other (Comment) 02/28/22 2053   Dressing Status Intact 02/28/22 1800   Dressing Intervention Integrity maintained 02/28/22 1800   Drainage Serosanguineous 02/28/22 2053   Status To bulb suction 02/28/22 2053   Output (mL) 20 mL 03/01/22 0500       Female External Urinary Catheter 02/22/22 2300 (Active)   Skin no redness;no breakdown 02/28/22 2053   Tolerance no signs/symptoms of discomfort 02/28/22 2053   Suction Continuous suction at 40 mmHg 02/28/22 2053   Date of last wick change 02/26/22 02/28/22 2053   Time of last wick change 2000 02/26/22 2000   Output (mL) 500 mL 02/27/22 0701     Neurosurgery Physical Exam  General: Well developed, well nourished, not in acute distress.   Head: Normocephalic, atraumatic.  Neck: Full ROM.   Neurologic: Alert and oriented x 4. Thought content appropriate.  GCS: Motor:6  Verbal:5  Eyes:4   GCS Total: 15  Language: No aphasia.  Speech: No dysarthria.  Cranial nerves: Face symmetric, tongue midline, CN II-XII grossly intact.   Eyes: Pupils equal, round, reactive to light  with accomodation, EOMI.   Pulmonary: Normal respirations, no signs of respiratory distress.  Abdomen: Soft, non-distended, non-tender to palpation.  Sensory: Intact to light touch throughout.  Motor Strength: Moves all extremities spontaneously with good tone. No abnormal movements seen.      Strength   Deltoids Triceps Biceps Wrist Extension Wrist Flexion Hand    Upper: R 5/5 5/5 5/5 5/5 5/5 5/5     L 5/5 5/5 5/5 5/5 5/5 5/5       Hip Flexion Knee Extension Knee  Flexion Dorsiflexion Plantar flexion EHL   Lower: R 5/5 5/5 5/5 5/5 5/5 5/5     L 4/5 4/5 4/5 5/5 5/5 5/5      Parker: Absent.  Clonus: 3 beats bilaterally.  Skin: Skin is warm, dry and intact.     Incision c/d/i with staples. No surrounding erythema or edema. No drainage from incision. No palpable hematoma or underlying fluid collection.     HV drains in place.    Significant Labs:  Recent Labs   Lab 03/02/22  1423 03/03/22  0232 03/04/22  0009   * 115* 115*    137 136   K 4.1 4.4 4.0    107 107   CO2 26 24 24   BUN 15 16 17   CREATININE 0.6 0.5 0.6   CALCIUM 7.7* 7.8* 7.8*   MG  --  1.6 1.6     Recent Labs   Lab 03/03/22  1453 03/03/22  1916 03/04/22  0009   WBC 4.72 4.60 3.10*   HGB 6.6* 8.2* 8.1*   HCT 21.4* 25.2* 25.0*   PLT 81* 79* 77*     Recent Labs   Lab 03/03/22  0232 03/04/22  0009   INR 1.3* 1.3*     Microbiology Results (last 7 days)       Procedure Component Value Units Date/Time    Culture, Anaerobe [[id number]] Collected: 03/02/22 1228    Order Status: Completed Specimen: Back Updated: 03/04/22 0859     Anaerobic Culture Culture in progress    Narrative:      L5/S1 DISC    AFB Culture & Smear [[id number]] Collected: 03/02/22 1228    Order Status: Completed Specimen: Back Updated: 03/03/22 2127     AFB Culture & Smear Culture in progress     AFB CULTURE STAIN No acid fast bacilli seen.    Narrative:      L5/S1 DISC    Aerobic culture [[id number]] Collected: 03/02/22 1228    Order Status: Completed Specimen: Back  Updated: 03/03/22 0634     Aerobic Bacterial Culture No growth    Narrative:      L5/S1 DISC    Fungus culture [[id number]] Collected: 02/21/22 1539    Order Status: Completed Specimen: Abscess from Back Updated: 03/02/22 1435     Fungus (Mycology) Culture Culture in progress    Narrative:      Epidural purulence #1    Fungus culture [[id number]] Collected: 02/21/22 1539    Order Status: Completed Specimen: Abscess from Back Updated: 03/02/22 1435     Fungus (Mycology) Culture Culture in progress    Narrative:      Epidural purulence #2    Gram stain [[id number]] Collected: 03/02/22 1228    Order Status: Completed Specimen: Back Updated: 03/02/22 1423     Gram Stain Result No WBC's      No organisms seen    Narrative:      L5/S1 DISC    Fungus culture [[id number]] Collected: 03/02/22 1228    Order Status: Sent Specimen: Back Updated: 03/02/22 1251    Fungus culture [[id number]] Collected: 02/15/22 1128    Order Status: Completed Specimen: Biopsy from Back Updated: 03/02/22 1028     Fungus (Mycology) Culture Culture in progress      No fungus isolated after 2 weeks    Narrative:      L3-4 / L4-5 osteodiscitis          All pertinent labs from the last 24 hours have been reviewed.    Significant Diagnostics:  I have reviewed and interpreted all pertinent imaging results/findings within the past 24 hours.    Assessment/Plan:     * Spondylodiscitis  Rachel Zazueta is a 41 F with PMH hepatitis C, cirrhosis, pancytopenia, nicotine abuse, hx IVDU (last use 2 years ago), strep agalactae bacteremia, s/p L3-5 TLIF on 4/16/2021 who presents with persistent and progressive lumbar spondylodiscitis with resultant hardware failure and new scoliosis.     S/p hardware removal on 2/21. Now s/p T10 t pelvis posterior instrumented fusion with L5-S1 TLIF    Plan:  --Patient admitted to [facility], okay to TTF to NSGY service today      -q2h neurochecks on PCU  --Post op XR Pending  --All labs and diagnostics personally reviewed  --Follow-up MRI  Brain, C/T/L w/wo contrast:   -No evidence of osteomyelitis, discitis, or epidural abscess within the cervical or thoracic spine   -No diffusion positive or abnormal enhancement within the brain   -MRI brain shows patchy nonspecific increased T2 and FLAIR signal in the periventricular and subcortical white matter bilaterally. Could be due to chronic microvascular ischemic changes vs demyelinating disease given patient's age. No evidence of active demyelination.   -DDD C4-7 with mild to moderate canal stenosis  --Full infectious w/u.   -Inflammatory markers wnl. BCx 2/14 NGTD, UA/Ucx pending. Wound Cx pending.   -F/u ID recs - now on Rocephin. PICC placed.   --S/p L3-4 disc biopsy and L3 bone biopsy with IR 2/15 - Gram stain rare WBC. Cx NGTD.  --Utox + for amphetamines, addiction psych consulted per .   --Hold anti-plt/coag medications.  --Monitor for urinary retention - voiding spontaneously. Bladder scan prn.  --Pain control: Oxy 10 q4h PRN for moderate pain, Oxy 15 q4h PRN for severe pain, Dilaudid 1 mg q4h PRN when pain not relieved by PO meds. Continue lyrica 50 mg TID.  --Drains: Keep, continue IV abx while in place. High output, will switch to gravity.  --Plts 77, transfuse Plt today for goal 100   --DVT ppx: TEDs/SCDs  --Bowel regimen: Miralax, senna, lactulose 2/23. + BM.  --Activity: PT/OT OOB, TLSO brace when OOB.  --Continue to monitor clinically, notify NSGY immediately with any changes in neuro status.    Discussed with Dr. Templeton.    Dispo: PTOT OOB today. Anticipate rehab.        Mabel Chang MD  Neurosurgery  [facility] - Neuro Critical Care

## 2022-03-04 NOTE — SUBJECTIVE & OBJECTIVE
Interval History: 3/4: POD 2, transfused platelets and PRBC yesterday, plt still low. Neuro stable. Drain and WV in place till POD 5    Medications:  Continuous Infusions:   sodium chloride 0.9% Stopped (03/03/22 1708)     Scheduled Meds:   acetaminophen  1,000 mg Oral Q6H    cefTRIAXone (ROCEPHIN) IVPB  2 g Intravenous Q12H    LIDOcaine  1 patch Transdermal Q24H    pantoprazole  40 mg Oral Daily    pregabalin  75 mg Oral TID    senna-docusate 8.6-50 mg  1 tablet Oral Daily    sertraline  50 mg Oral Daily    sodium chloride 0.9%  10 mL Intravenous Q6H     PRN Meds:aluminum-magnesium hydroxide-simethicone, dextrose 10%, dextrose 10%, glucagon (human recombinant), glucose, glucose, HYDROmorphone, melatonin, oxyCODONE, oxyCODONE, polyethylene glycol, Flushing PICC Protocol **AND** sodium chloride 0.9% **AND** sodium chloride 0.9%     Objective:     Weight: 133.8 kg (294 lb 15.6 oz)  Body mass index is 46.2 kg/m².  Vital Signs (Most Recent):  Temp: 97.9 °F (36.6 °C) (03/04/22 0705)  Pulse: 103 (03/04/22 1105)  Resp: 19 (03/04/22 1105)  BP: (!) 116/59 (03/04/22 1000)  SpO2: 100 % (03/04/22 1105) Vital Signs (24h Range):  Temp:  [97.9 °F (36.6 °C)-98.4 °F (36.9 °C)] 97.9 °F (36.6 °C)  Pulse:  [] 103  Resp:  [9-24] 19  SpO2:  [96 %-100 %] 100 %  BP: (109-151)/(55-85) 116/59  Arterial Line BP: ()/() 176/105                Resp Rate Total:  [11 br/min-23 br/min] 23 br/min         Closed/Suction Drain 02/21/22 1644 Left;Medial Back Accordion 10 Fr. (Active)   Site Description Healing 02/28/22 2053   Dressing Type Other (Comment) 02/28/22 2053   Dressing Status Intact 02/27/22 2048   Dressing Intervention Integrity maintained 02/28/22 1800   Drainage Serosanguineous 02/28/22 2053   Status To bulb suction 02/28/22 2053   Output (mL) 100 mL 03/01/22 0500            Closed/Suction Drain 02/21/22 1645 Right;Medial Back Accordion 10 Fr. (Active)   Site Description Healing 02/28/22 2053   Dressing Type Other  (Comment) 02/28/22 2053   Dressing Status Intact 02/28/22 1800   Dressing Intervention Integrity maintained 02/28/22 1800   Drainage Serosanguineous 02/28/22 2053   Status To bulb suction 02/28/22 2053   Output (mL) 20 mL 03/01/22 0500       Female External Urinary Catheter 02/22/22 2300 (Active)   Skin no redness;no breakdown 02/28/22 2053   Tolerance no signs/symptoms of discomfort 02/28/22 2053   Suction Continuous suction at 40 mmHg 02/28/22 2053   Date of last wick change 02/26/22 02/28/22 2053   Time of last wick change 2000 02/26/22 2000   Output (mL) 500 mL 02/27/22 0701     Neurosurgery Physical Exam  General: Well developed, well nourished, not in acute distress.   Head: Normocephalic, atraumatic.  Neck: Full ROM.   Neurologic: Alert and oriented x 4. Thought content appropriate.  GCS: Motor:6  Verbal:5  Eyes:4   GCS Total: 15  Language: No aphasia.  Speech: No dysarthria.  Cranial nerves: Face symmetric, tongue midline, CN II-XII grossly intact.   Eyes: Pupils equal, round, reactive to light with accomodation, EOMI.   Pulmonary: Normal respirations, no signs of respiratory distress.  Abdomen: Soft, non-distended, non-tender to palpation.  Sensory: Intact to light touch throughout.  Motor Strength: Moves all extremities spontaneously with good tone. No abnormal movements seen.      Strength   Deltoids Triceps Biceps Wrist Extension Wrist Flexion Hand    Upper: R 5/5 5/5 5/5 5/5 5/5 5/5     L 5/5 5/5 5/5 5/5 5/5 5/5       Hip Flexion Knee Extension Knee  Flexion Dorsiflexion Plantar flexion EHL   Lower: R 5/5 5/5 5/5 5/5 5/5 5/5     L 4/5 4/5 4/5 5/5 5/5 5/5      Parker: Absent.  Clonus: 3 beats bilaterally.  Skin: Skin is warm, dry and intact.     Incision c/d/i with staples. No surrounding erythema or edema. No drainage from incision. No palpable hematoma or underlying fluid collection.     HV drains in place.    Significant Labs:  Recent Labs   Lab 03/02/22  1423 03/03/22  0232 03/04/22  0009    * 115* 115*    137 136   K 4.1 4.4 4.0    107 107   CO2 26 24 24   BUN 15 16 17   CREATININE 0.6 0.5 0.6   CALCIUM 7.7* 7.8* 7.8*   MG  --  1.6 1.6     Recent Labs   Lab 03/03/22  1453 03/03/22  1916 03/04/22  0009   WBC 4.72 4.60 3.10*   HGB 6.6* 8.2* 8.1*   HCT 21.4* 25.2* 25.0*   PLT 81* 79* 77*     Recent Labs   Lab 03/03/22  0232 03/04/22  0009   INR 1.3* 1.3*     Microbiology Results (last 7 days)       Procedure Component Value Units Date/Time    Culture, Anaerobe [[id number]] Collected: 03/02/22 1228    Order Status: Completed Specimen: Back Updated: 03/04/22 0859     Anaerobic Culture Culture in progress    Narrative:      L5/S1 DISC    AFB Culture & Smear [[id number]] Collected: 03/02/22 1228    Order Status: Completed Specimen: Back Updated: 03/03/22 2127     AFB Culture & Smear Culture in progress     AFB CULTURE STAIN No acid fast bacilli seen.    Narrative:      L5/S1 DISC    Aerobic culture [[id number]] Collected: 03/02/22 1228    Order Status: Completed Specimen: Back Updated: 03/03/22 0634     Aerobic Bacterial Culture No growth    Narrative:      L5/S1 DISC    Fungus culture [[id number]] Collected: 02/21/22 1539    Order Status: Completed Specimen: Abscess from Back Updated: 03/02/22 1435     Fungus (Mycology) Culture Culture in progress    Narrative:      Epidural purulence #1    Fungus culture [[id number]] Collected: 02/21/22 1539    Order Status: Completed Specimen: Abscess from Back Updated: 03/02/22 1435     Fungus (Mycology) Culture Culture in progress    Narrative:      Epidural purulence #2    Gram stain [[id number]] Collected: 03/02/22 1228    Order Status: Completed Specimen: Back Updated: 03/02/22 1423     Gram Stain Result No WBC's      No organisms seen    Narrative:      L5/S1 DISC    Fungus culture [[id number]] Collected: 03/02/22 1228    Order Status: Sent Specimen: Back Updated: 03/02/22 1251    Fungus culture [[id number]] Collected: 02/15/22 1128    Order Status:  Completed Specimen: Biopsy from Back Updated: 03/02/22 1028     Fungus (Mycology) Culture Culture in progress      No fungus isolated after 2 weeks    Narrative:      L3-4 / L4-5 osteodiscitis          All pertinent labs from the last 24 hours have been reviewed.    Significant Diagnostics:  I have reviewed and interpreted all pertinent imaging results/findings within the past 24 hours.

## 2022-03-04 NOTE — SUBJECTIVE & OBJECTIVE
Interval History:  D/c chay, central line, and gu. Patient expresses subjective improvement in pain. See above.     Review of Systems   Constitutional:  Negative for fatigue.   Gastrointestinal:  Negative for diarrhea and vomiting.   Musculoskeletal:  Positive for back pain. Negative for gait problem.   Skin:  Negative for wound.     Objective:     Vitals:  Temp: 98 °F (36.7 °C)  Pulse: 96  Rhythm: normal sinus rhythm  BP: (!) 118/56  MAP (mmHg): 80  Resp: (!) 22  SpO2: 97 %  O2 Device (Oxygen Therapy): room air    Temp  Min: 97.9 °F (36.6 °C)  Max: 98.4 °F (36.9 °C)  Pulse  Min: 91  Max: 111  BP  Min: 109/55  Max: 151/69  MAP (mmHg)  Min: 74  Max: 103  Resp  Min: 9  Max: 24  SpO2  Min: 96 %  Max: 100 %    03/03 0701 - 03/04 0700  In: 2919.1 [P.O.:1130; I.V.:239.5]  Out: 1205 [Urine:1015; Drains:190]   Unmeasured Output  Urine Occurrence: 1  Stool Occurrence: 0  Pad Count: 1       Physical Exam  Vitals and nursing note reviewed.   Constitutional:       Appearance: She is obese.   HENT:      Head: Normocephalic.      Nose: Nose normal.      Mouth/Throat:      Mouth: Mucous membranes are moist.   Eyes:      Extraocular Movements: Extraocular movements intact.      Pupils: Pupils are equal, round, and reactive to light.   Cardiovascular:      Rate and Rhythm: Normal rate.   Pulmonary:      Effort: No respiratory distress.   Abdominal:      General: There is distension.      Tenderness: There is no abdominal tenderness.   Musculoskeletal:         General: Normal range of motion.   Neurological:      Mental Status: She is alert.      GCS: GCS eye subscore is 4. GCS verbal subscore is 5. GCS motor subscore is 6.      Cranial Nerves: No cranial nerve deficit.      Sensory: No sensory deficit.      Motor: Weakness present.      Comments: Moving all 4 extremities spontaneously, 5/5 strength throughout  W/D to pain to bl lower extremities.   No saddle anesthesia noted.          Medications:  Continuoussodium chloride  0.9%, Last Rate: Stopped (03/03/22 1708)    Scheduledacetaminophen, 1,000 mg, Q6H  cefTRIAXone (ROCEPHIN) IVPB, 2 g, Q12H  LIDOcaine, 1 patch, Q24H  pantoprazole, 40 mg, Daily  pregabalin, 75 mg, TID  senna-docusate 8.6-50 mg, 1 tablet, Daily  sertraline, 50 mg, Daily  sodium chloride 0.9%, 10 mL, Q6H    PRNsodium chloride, , Q24H PRN  aluminum-magnesium hydroxide-simethicone, 30 mL, Q6H PRN  dextrose 10%, 12.5 g, PRN  dextrose 10%, 25 g, PRN  glucagon (human recombinant), 1 mg, PRN  glucose, 16 g, PRN  glucose, 24 g, PRN  HYDROmorphone, 2 mg, Q3H PRN  melatonin, 6 mg, Nightly PRN  oxyCODONE, 10 mg, Q4H PRN  oxyCODONE, 20 mg, Q4H PRN  polyethylene glycol, 17 g, Daily PRN  sodium chloride 0.9%, 10 mL, PRN      Today I personally reviewed pertinent medications, lines/drains/airways, imaging, cardiology results, laboratory results, microbiology results, notably:    Diet  Diet Adult Regular (IDDSI Level 7)  Diet Adult Regular (IDDSI Level 7)

## 2022-03-04 NOTE — ASSESSMENT & PLAN NOTE
Rachel Zazueta is a 41 F with PMH hepatitis C, cirrhosis, pancytopenia, nicotine abuse, hx IVDU (last use 2 years ago), strep agalactae bacteremia, s/p L3-5 TLIF on 4/16/2021 who presents with persistent and progressive lumbar spondylodiscitis with resultant hardware failure and new scoliosis.     S/p hardware removal on 2/21. Now s/p T10 t pelvis posterior instrumented fusion with L5-S1 TLIF    Plan:  --Patient admitted to [facility], okay to TTF to NSGY service today      -q2h neurochecks on PCU  --Post op XR Pending  --All labs and diagnostics personally reviewed  --Follow-up MRI Brain, C/T/L w/wo contrast:   -No evidence of osteomyelitis, discitis, or epidural abscess within the cervical or thoracic spine   -No diffusion positive or abnormal enhancement within the brain   -MRI brain shows patchy nonspecific increased T2 and FLAIR signal in the periventricular and subcortical white matter bilaterally. Could be due to chronic microvascular ischemic changes vs demyelinating disease given patient's age. No evidence of active demyelination.   -DDD C4-7 with mild to moderate canal stenosis  --Full infectious w/u.   -Inflammatory markers wnl. BCx 2/14 NGTD, UA/Ucx pending. Wound Cx pending.   -F/u ID recs - now on Rocephin. PICC placed.   --S/p L3-4 disc biopsy and L3 bone biopsy with IR 2/15 - Gram stain rare WBC. Cx NGTD.  --Utox + for amphetamines, addiction psych consulted per .   --Hold anti-plt/coag medications.  --Monitor for urinary retention - voiding spontaneously. Bladder scan prn.  --Pain control: Oxy 10 q4h PRN for moderate pain, Oxy 15 q4h PRN for severe pain, Dilaudid 1 mg q4h PRN when pain not relieved by PO meds. Continue lyrica 50 mg TID.  --Drains: Keep, continue IV abx while in place. High output, will switch to gravity.  --Plts 77, transfuse Plt today for goal 100   --DVT ppx: TEDs/SCDs  --Bowel regimen: Miralax, senna, lactulose 2/23. + BM.  --Activity: PT/OT OOB, TLSO brace when OOB.  --Continue  to monitor clinically, notify NSGY immediately with any changes in neuro status.    Discussed with Dr. Templeton.    Dispo: PTOT OOB today. Anticipate rehab.

## 2022-03-04 NOTE — ASSESSMENT & PLAN NOTE
Hx of bone marrow biopsy 2017 that was nondiagnostic.      Recommendations:  - daily labs  - transfuse for Hgb <7  - transfuse for plt <10k or <50k with bleeding    3/4 2 u platelets per NSGY

## 2022-03-04 NOTE — SUBJECTIVE & OBJECTIVE
Past Medical History:   Diagnosis Date    Anemia     Diskitis     Hep C w/o coma, chronic     IV drug abuse     Liver cirrhosis        Past Surgical History:   Procedure Laterality Date    BACK SURGERY      benine tumor removal      forehead, age 9    CHOLECYSTECTOMY      KIDNEY SURGERY      LUMBAR FUSION Bilateral 3/2/2022    Procedure: FUSION, SPINE, LUMBAR;  Surgeon: Guille Templeton MD;  Location: [facility] OR [address] [place] [address];  Service: Neurosurgery;  Laterality: Bilateral;  AIRO  T10--Pelvis    REMOVAL OF HARDWARE FROM SPINE N/A 2/21/2022    Procedure: REMOVAL, HARDWARE, SPINE;  Surgeon: Guille Templeton MD;  Location: [facility] OR [address] [place] [address];  Service: Neurosurgery;  Laterality: N/A;  [place]       Review of patient's allergies indicates:   Allergen Reactions    Bee venom protein (honey bee) Anaphylaxis     Patient reports she is allergic to bee stings.    Wasp sting [allergen ext-venom-honey bee] Anaphylaxis    Adhesive Blisters    Strawberry Hives     Patient reports itching and hives    Iodine and iodide containing products Hives    Naproxen Other (See Comments)     Throat closing    Shellfish containing products     Nuts [tree nut] Rash       Medications:  Medications Prior to Admission   Medication Sig    diclofenac sodium (VOLTAREN) 1 % Gel Apply 2 g topically 4 (four) times daily.    polyethylene glycol (GLYCOLAX) 17 gram/dose powder Take 17 g by mouth once daily.    pregabalin (LYRICA) 50 MG capsule Take 1 capsule (50 mg total) by mouth 3 (three) times daily.    sertraline (ZOLOFT) 25 MG tablet Take 1 tablet (25 mg total) by mouth once daily.     Antibiotics (From admission, onward)                Start     Stop Route Frequency Ordered    02/23/22 0159  cefTRIAXone (ROCEPHIN) 2 g/50 mL D5W IVPB         -- IV Every 12 hours (non-standard times) 02/22/22 1953          Antifungals (From admission, onward)                None          Antivirals (From admission, onward)      None             Immunization History   Administered  "Date(s) Administered    COVID-19, MRNA, LN-S, PF (Pfizer) (Purple Cap) 04/30/2021    Influenza - Quadrivalent - PF *Preferred* (6 months and older) 10/05/2018, 01/26/2022    Pneumococcal Conjugate - 13 Valent 10/05/2018    Tdap 08/19/2018       Family History       Problem Relation (Age of Onset)    Drug abuse Mother    Hepatitis Mother          Social History     Socioeconomic History    Marital status:    Tobacco Use    Smoking status: Current Some Day Smoker     Packs/day: 0.25     Years: 23.00     Pack years: 5.75     Types: Cigarettes    Smokeless tobacco: Never Used   Substance and Sexual Activity    Alcohol use: Not Currently    Drug use: Yes     Frequency: 4.0 times per week     Types: Methamphetamines, Marijuana     Comment: Apolloibis 1/month.  Meth- Injection, 1/w, injection in the right hand and wrist. Denies shared needles with other people, but occasional reuse at times. In addition endorses using a "fresh point" for sites of injection. Last use 3 days prior that was inhal    Sexual activity: Yes     Partners: Male     Birth control/protection: None     Social Determinants of Health     Financial Resource Strain: Medium Risk    Difficulty of Paying Living Expenses: Somewhat hard   Food Insecurity: No Food Insecurity    Worried About Running Out of Food in the Last Year: Never true    Ran Out of Food in the Last Year: Never true   Transportation Needs: Unmet Transportation Needs    Lack of Transportation (Medical): Yes    Lack of Transportation (Non-Medical): Yes   Physical Activity: Inactive    Days of Exercise per Week: 0 days    Minutes of Exercise per Session: 0 min   Stress: Stress Concern Present    Feeling of Stress : Very much   Social Connections: Unknown    Frequency of Communication with Friends and Family: Once a week    Frequency of Social Gatherings with Friends and Family: Once a week    Active Member of Clubs or Organizations: No    Marital Status: Living with partner   Housing " Stability: Low Risk     Unable to Pay for Housing in the Last Year: No    Number of Places Lived in the Last Year: 2    Unstable Housing in the Last Year: No     Review of Systems   Musculoskeletal:  Positive for back pain.   All other systems reviewed and are negative.  Objective:     Vital Signs (Most Recent):  Temp: 98.3 °F (36.8 °C) (03/03/22 1902)  Pulse: 98 (03/03/22 2102)  Resp: (!) 21 (03/03/22 2116)  BP: (!) 109/55 (03/03/22 2102)  SpO2: 98 % (03/03/22 2102)   Vital Signs (24h Range):  Temp:  [97.3 °F (36.3 °C)-98.7 °F (37.1 °C)] 98.3 °F (36.8 °C)  Pulse:  [] 98  Resp:  [11-31] 21  SpO2:  [95 %-100 %] 98 %  BP: (108-148)/(53-78) 109/55  Arterial Line BP: (100-143)/(54-72) 116/62     Weight: 133.8 kg (294 lb 15.6 oz)  Body mass index is 46.2 kg/m².    Estimated Creatinine Clearance: 211.5 mL/min (based on SCr of 0.5 mg/dL).    Physical Exam  Constitutional:       General: She is not in acute distress.     Appearance: She is well-developed. She is not diaphoretic.   HENT:      Head: Normocephalic and atraumatic.   Cardiovascular:      Rate and Rhythm: Normal rate and regular rhythm.      Heart sounds: Normal heart sounds. No murmur heard.    No friction rub. No gallop.   Pulmonary:      Effort: Pulmonary effort is normal. No respiratory distress.      Breath sounds: Normal breath sounds. No wheezing or rales.      Comments: Anterior exam as patient with back pain  Abdominal:      General: Bowel sounds are normal. There is no distension.      Palpations: Abdomen is soft. There is no mass.      Tenderness: There is no abdominal tenderness. There is no guarding or rebound.   Skin:     General: Skin is warm and dry.   Neurological:      Mental Status: She is alert and oriented to person, place, and time.   Psychiatric:         Behavior: Behavior normal.       Significant Labs:   Microbiology Results (last 7 days)       Procedure Component Value Units Date/Time    AFB Culture & Smear [[id number]] Collected:  03/02/22 1228    Order Status: Completed Specimen: Back Updated: 03/03/22 2127     AFB Culture & Smear Culture in progress     AFB CULTURE STAIN No acid fast bacilli seen.    Narrative:      L5/S1 DISC    Culture, Anaerobe [[id number]] Collected: 03/02/22 1228    Order Status: Completed Specimen: Back Updated: 03/03/22 0714     Anaerobic Culture Culture in progress    Narrative:      L5/S1 DISC    Aerobic culture [[id number]] Collected: 03/02/22 1228    Order Status: Completed Specimen: Back Updated: 03/03/22 0634     Aerobic Bacterial Culture No growth    Narrative:      L5/S1 DISC    Fungus culture [[id number]] Collected: 02/21/22 1539    Order Status: Completed Specimen: Abscess from Back Updated: 03/02/22 1435     Fungus (Mycology) Culture Culture in progress    Narrative:      Epidural purulence #1    Fungus culture [[id number]] Collected: 02/21/22 1539    Order Status: Completed Specimen: Abscess from Back Updated: 03/02/22 1435     Fungus (Mycology) Culture Culture in progress    Narrative:      Epidural purulence #2    Gram stain [[id number]] Collected: 03/02/22 1228    Order Status: Completed Specimen: Back Updated: 03/02/22 1423     Gram Stain Result No WBC's      No organisms seen    Narrative:      L5/S1 DISC    Fungus culture [[id number]] Collected: 03/02/22 1228    Order Status: Sent Specimen: Back Updated: 03/02/22 1251    Fungus culture [[id number]] Collected: 02/15/22 1128    Order Status: Completed Specimen: Biopsy from Back Updated: 03/02/22 1028     Fungus (Mycology) Culture Culture in progress      No fungus isolated after 2 weeks    Narrative:      L3-4 / L4-5 osteodiscitis    Aerobic culture [[id number]] Collected: 02/21/22 1539    Order Status: Completed Specimen: Abscess from Back Updated: 02/25/22 0957     Aerobic Bacterial Culture No growth    Narrative:      Epidural purulence #1    Culture, Anaerobe [[id number]] Collected: 02/21/22 1539    Order Status: Completed Specimen: Abscess from Back  Updated: 02/25/22 0728     Anaerobic Culture No anaerobes isolated    Narrative:      Epidural purulence #2    Culture, Anaerobe [[id number]] Collected: 02/21/22 1539    Order Status: Completed Specimen: Abscess from Back Updated: 02/25/22 0728     Anaerobic Culture No anaerobes isolated    Narrative:      Epidural purulence #1            Significant Imaging: I have reviewed all pertinent imaging results/findings within the past 24 hours.

## 2022-03-04 NOTE — PLAN OF CARE
[facility] Care Plan    POC reviewed with Rachel Zazueta at 1400. Pt verbalized understanding. Questions and concerns addressed. No acute events today. Pt progressing toward goals. Will continue to monitor. See below and flowsheets for full assessment and VS info.     -2 units of platelets, 2 units of PRBCs given      Neuro:  Jane Coma Scale  Best Eye Response: 4-->(E4) spontaneous  Best Motor Response: 6-->(M6) obeys commands  Best Verbal Response: 5-->(V5) oriented  [place] Coma Scale Score: 15  Assessment Qualifiers: patient not sedated/intubated, no eye obstruction present  Pupil PERRLA: yes     24 hr Temp:  [97.3 °F (36.3 °C)-99 °F (37.2 °C)]     CV:   Rhythm: sinus tachycardia  BP goals:   SBP < 180  MAP > 65    Resp:   O2 Device (Oxygen Therapy): room air       Plan: N/A    GI/:     Diet/Nutrition Received: regular  Last Bowel Movement: 03/01/22  Voiding Characteristics: urethral catheter (bladder)    Intake/Output Summary (Last 24 hours) at 3/3/2022 1834  Last data filed at 3/3/2022 1800  Gross per 24 hour   Intake 2314.09 ml   Output 1145 ml   Net 1169.09 ml     Unmeasured Output  Urine Occurrence: 1  Stool Occurrence: 1  Pad Count: 1    Labs/Accuchecks:  Recent Labs   Lab 03/03/22  1453   WBC 4.72   RBC 2.25*   HGB 6.6*   HCT 21.4*   PLT 81*      Recent Labs   Lab 03/03/22  0232      K 4.4   CO2 24      BUN 16   CREATININE 0.5   ALKPHOS 89   ALT 26   AST 58*   BILITOT 1.5*      Recent Labs   Lab 03/03/22  0232   INR 1.3*    No results for input(s): CPK, CPKMB, TROPONINI, MB in the last 168 hours.    Electrolytes: N/A - electrolytes WDL  Accuchecks: none    Gtts:   sodium chloride 0.9% 10 mL/hr at 02/27/22 0305       LDA/Wounds:  Lines/Drains/Airways       Peripherally Inserted Central Catheter Line  Duration             PICC Double Lumen 02/22/22 1110 right basilic 9 days              Central Venous Catheter Line  Duration             Percutaneous Central Line Insertion/Assessment - Double  Lumen  03/02/22 1006 right internal jugular 1 day              Drain  Duration                  Closed/Suction Drain 03/02/22 1208 Left;Superior Back Accordion 10 Fr. 1 day         Closed/Suction Drain 03/02/22 1208 Right;Superior Back Accordion 10 Fr. 1 day         Urethral Catheter 03/02/22 0910 Straight-tip;Silicone 16 Fr. 1 day              Arterial Line  Duration             Arterial Line 03/02/22 0828 Left Radial 1 day                  Wounds: Yes  Wound care consulted: Yes

## 2022-03-04 NOTE — CONSULTS
[facility]  Infectious Disease  Consult Note    Patient Name: Rachel Zazueta  MRN: [id number]  Admission Date: 2/14/2022  Hospital Length of Stay: 17 days  Attending Physician: Guille Templeton MD  Primary Care Provider: Dannielle Merino DO     Isolation Status: No active isolations    Patient information was obtained from patient, past medical records and ER records.      Inpatient consult to Infectious Diseases  Consult performed by: Efren Bellamy MD  Consult ordered by: Isela Hou PA-C        Assessment/Plan:     * Spondylodiscitis  41 year old female with remote hx of IVDU, GBS bacteremia, L3-4 osteodiscitis & epidural abscess s/p washout and fusion 4/2021 who presented to NSGY clinic with worsening back pain since October and admitted after imaging showed worsened L3-4 osteodiscitis with extension to L4-5 along with hardware loosening.    Patient underwent L3-4 intervertebral disc biopsy on 2/15. She is s/p hardware removal on 2/21 though cages retained L3-4 and L4-5.  Patient was on empiric Vancomycin and Ceftriaxone - now rocephin. IR/OR cultures and blood cx are NGTD. Afebrile. HDS.  On high dose ceftriaxone given cultures from epidural space. Patient hardware re-implantation on march 2.    Plan    1. Continue ceftriaxone IV    2. Follow up on results of surgical cultures    Chronic hepatitis C with cirrhosis    History of HCV. Needs Hepatology follow up after discharge for further evaluation and treatment.        Thank you for your consult. I will follow-up with patient. Please contact us if you have any additional questions.    Efren Bellamy MD  Infectious Disease  [facility]    Subjective:     Principal Problem: Spondylodiscitis    HPI: Rachel Zazueta is a 41 year old female with HCV, liver, cirrhosis, anemia, and polysubstance abuse admitted in April 2021 with GBS bacteremia and L3-4 spondylodiscitis with associated epidural abscess s/p posterior  spinal fusion and evacuation of epidural abscess on 4/16.  She was treated with Ceftriaxone with a plan to complete 8 weeks of antibiotics (end date 6/11/21). Initially at [facility] and  discharged from Rehab with a PICC line to finish antibiotics at home. Patient reports only completing 4-5 weeks of IV antibiotics as her PICC line accidentally fell at at home. She did not inform ID or go to the ED for replacement as her back pain had resolved and she felt well. She missed her last ID appt.    In October her back pain returned and radiates down her left leg. She was seen in NSGY 2/14 for this.  CT ordered reviewed and showed increased destructive endplate changes at L3-4, new destructive endplate changes at L4-5, and worsening alignment abnormalities, with adjacent soft tissue edema and lymphadenopathy, highly concerning for discitis osteomyelitis. New lucencies at the bone-metal interfaces of all pedicle screws and both interbody spacers, concerning for loosening. Patient was admitted for further evaluation and treatment.    Patient afebrile without a leukocytosis. Sed rate 57/CRP 29. She stated that she is no longer using IV drugs and only using marijuana.  Denies recent abx use.    MRI C/T/L spine - Suspected discitis/osteomyelitis of the lumbar spine at L3-4 and L4-5 with postoperative changes of posterior fusion from L3 through L5 with probable hardware infection.  IR consulted and performed L3-4 intervertebral disc biopsy. Cx are negative.           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9    CHOLECYSTECTOMY      KIDNEY SURGERY      LUMBAR FUSION Bilateral 3/2/2022    Procedure: FUSION, SPINE, LUMBAR;  Surgeon: Guille Templeton MD;  Location: [facility] OR [address] [place] [address];  Service: Neurosurgery;   Laterality: Bilateral;  AIRO  T10--Pelvis    REMOVAL OF HARDWARE FROM SPINE N/A 2/21/2022    Procedure: REMOVAL, HARDWARE, SPINE;  Surgeon: Guille Templeton MD;  Location: [facility] OR [address] [place] [address];  Service: Neurosurgery;  Laterality: N/A;  [place]       Review of patient's allergies indicates:   Allergen Reactions    Bee venom protein (honey bee) Anaphylaxis     Patient reports she is allergic to bee stings.    Wasp sting [allergen ext-venom-honey bee] Anaphylaxis    Adhesive Blisters    Strawberry Hives     Patient reports itching and hives    Iodine and iodide containing products Hives    Naproxen Other (See Comments)     Throat closing    Shellfish containing products     Nuts [tree nut] Rash       Medications:  Medications Prior to Admission   Medication Sig    diclofenac sodium (VOLTAREN) 1 % Gel Apply 2 g topically 4 (four) times daily.    polyethylene glycol (GLYCOLAX) 17 gram/dose powder Take 17 g by mouth once daily.    pregabalin (LYRICA) 50 MG capsule Take 1 capsule (50 mg total) by mouth 3 (three) times daily.    sertraline (ZOLOFT) 25 MG tablet Take 1 tablet (25 mg total) by mouth once daily.     Antibiotics (From admission, onward)                Start     Stop Route Frequency Ordered    02/23/22 0159  cefTRIAXone (ROCEPHIN) 2 g/50 mL D5W IVPB         -- IV Every 12 hours (non-standard times) 02/22/22 1953          Antifungals (From admission, onward)                None          Antivirals (From admission, onward)      None             Immunization History   Administered Date(s) Administered    COVID-19, MRNA, LN-S, PF (Pfizer) (Purple Cap) 04/30/2021    Influenza - Quadrivalent - PF *Preferred* (6 months and older) 10/05/2018, 01/26/2022    Pneumococcal Conjugate - 13 Valent 10/05/2018    Tdap 08/19/2018       Family History       Problem Relation (Age of Onset)    Drug abuse Mother    Hepatitis Mother          Social History     Socioeconomic History    Marital status:    Tobacco Use  "   Smoking status: Current Some Day Smoker     Packs/day: 0.25     Years: 23.00     Pack years: 5.75     Types: Cigarettes    Smokeless tobacco: Never Used   Substance and Sexual Activity    Alcohol use: Not Currently    Drug use: Yes     Frequency: 4.0 times per week     Types: Methamphetamines, Marijuana     Comment: Apolloibis 1/month.  Meth- Injection, 1/w, injection in the right hand and wrist. Denies shared needles with other people, but occasional reuse at times. In addition endorses using a "fresh point" for sites of injection. Last use 3 days prior that was inhal    Sexual activity: Yes     Partners: Male     Birth control/protection: None     Social Determinants of Health     Financial Resource Strain: Medium Risk    Difficulty of Paying Living Expenses: Somewhat hard   Food Insecurity: No Food Insecurity    Worried About Running Out of Food in the Last Year: Never true    Ran Out of Food in the Last Year: Never true   Transportation Needs: Unmet Transportation Needs    Lack of Transportation (Medical): Yes    Lack of Transportation (Non-Medical): Yes   Physical Activity: Inactive    Days of Exercise per Week: 0 days    Minutes of Exercise per Session: 0 min   Stress: Stress Concern Present    Feeling of Stress : Very much   Social Connections: Unknown    Frequency of Communication with Friends and Family: Once a week    Frequency of Social Gatherings with Friends and Family: Once a week    Active Member of Clubs or Organizations: No    Marital Status: Living with partner   Housing Stability: Low Risk     Unable to Pay for Housing in the Last Year: No    Number of Places Lived in the Last Year: 2    Unstable Housing in the Last Year: No     Review of Systems   Musculoskeletal:  Positive for back pain.   All other systems reviewed and are negative.  Objective:     Vital Signs (Most Recent):  Temp: 98.3 °F (36.8 °C) (03/03/22 1902)  Pulse: 98 (03/03/22 2102)  Resp: (!) 21 (03/03/22 " 2116)  BP: (!) 109/55 (03/03/22 2102)  SpO2: 98 % (03/03/22 2102)   Vital Signs (24h Range):  Temp:  [97.3 °F (36.3 °C)-98.7 °F (37.1 °C)] 98.3 °F (36.8 °C)  Pulse:  [] 98  Resp:  [11-31] 21  SpO2:  [95 %-100 %] 98 %  BP: (108-148)/(53-78) 109/55  Arterial Line BP: (100-143)/(54-72) 116/62     Weight: 133.8 kg (294 lb 15.6 oz)  Body mass index is 46.2 kg/m².    Estimated Creatinine Clearance: 211.5 mL/min (based on SCr of 0.5 mg/dL).    Physical Exam  Constitutional:       General: She is not in acute distress.     Appearance: She is well-developed. She is not diaphoretic.   HENT:      Head: Normocephalic and atraumatic.   Cardiovascular:      Rate and Rhythm: Normal rate and regular rhythm.      Heart sounds: Normal heart sounds. No murmur heard.    No friction rub. No gallop.   Pulmonary:      Effort: Pulmonary effort is normal. No respiratory distress.      Breath sounds: Normal breath sounds. No wheezing or rales.      Comments: Anterior exam as patient with back pain  Abdominal:      General: Bowel sounds are normal. There is no distension.      Palpations: Abdomen is soft. There is no mass.      Tenderness: There is no abdominal tenderness. There is no guarding or rebound.   Skin:     General: Skin is warm and dry.   Neurological:      Mental Status: She is alert and oriented to person, place, and time.   Psychiatric:         Behavior: Behavior normal.       Significant Labs:   Microbiology Results (last 7 days)       Procedure Component Value Units Date/Time    AFB Culture & Smear [[id number]] Collected: 03/02/22 1228    Order Status: Completed Specimen: Back Updated: 03/03/22 2127     AFB Culture & Smear Culture in progress     AFB CULTURE STAIN No acid fast bacilli seen.    Narrative:      L5/S1 DISC    Culture, Anaerobe [[id number]] Collected: 03/02/22 1228    Order Status: Completed Specimen: Back Updated: 03/03/22 0714     Anaerobic Culture Culture in progress    Narrative:      L5/S1 DISC    Aerobic  culture [[id number]] Collected: 03/02/22 1228    Order Status: Completed Specimen: Back Updated: 03/03/22 0634     Aerobic Bacterial Culture No growth    Narrative:      L5/S1 DISC    Fungus culture [[id number]] Collected: 02/21/22 1539    Order Status: Completed Specimen: Abscess from Back Updated: 03/02/22 1435     Fungus (Mycology) Culture Culture in progress    Narrative:      Epidural purulence #1    Fungus culture [[id number]] Collected: 02/21/22 1539    Order Status: Completed Specimen: Abscess from Back Updated: 03/02/22 1435     Fungus (Mycology) Culture Culture in progress    Narrative:      Epidural purulence #2    Gram stain [[id number]] Collected: 03/02/22 1228    Order Status: Completed Specimen: Back Updated: 03/02/22 1423     Gram Stain Result No WBC's      No organisms seen    Narrative:      L5/S1 DISC    Fungus culture [[id number]] Collected: 03/02/22 1228    Order Status: Sent Specimen: Back Updated: 03/02/22 1251    Fungus culture [[id number]] Collected: 02/15/22 1128    Order Status: Completed Specimen: Biopsy from Back Updated: 03/02/22 1028     Fungus (Mycology) Culture Culture in progress      No fungus isolated after 2 weeks    Narrative:      L3-4 / L4-5 osteodiscitis    Aerobic culture [[id number]] Collected: 02/21/22 1539    Order Status: Completed Specimen: Abscess from Back Updated: 02/25/22 0957     Aerobic Bacterial Culture No growth    Narrative:      Epidural purulence #1    Culture, Anaerobe [[id number]] Collected: 02/21/22 1539    Order Status: Completed Specimen: Abscess from Back Updated: 02/25/22 0728     Anaerobic Culture No anaerobes isolated    Narrative:      Epidural purulence #2    Culture, Anaerobe [[id number]] Collected: 02/21/22 1539    Order Status: Completed Specimen: Abscess from Back Updated: 02/25/22 0728     Anaerobic Culture No anaerobes isolated    Narrative:      Epidural purulence #1            Significant Imaging: I have reviewed all pertinent imaging  results/findings within the past 24 hours.

## 2022-03-05 PROBLEM — N89.8 VAGINAL ITCHING: Status: ACTIVE | Noted: 2022-03-05

## 2022-03-05 LAB
ALBUMIN SERPL BCP-MCNC: 2.2 G/DL (ref 3.5–5.2)
ALP SERPL-CCNC: 98 U/L (ref 55–135)
ALT SERPL W/O P-5'-P-CCNC: 25 U/L (ref 10–44)
ANION GAP SERPL CALC-SCNC: 8 MMOL/L (ref 8–16)
AST SERPL-CCNC: 49 U/L (ref 10–40)
BACTERIA SPEC AEROBE CULT: NO GROWTH
BASOPHILS # BLD AUTO: 0.03 K/UL (ref 0–0.2)
BASOPHILS NFR BLD: 1.1 % (ref 0–1.9)
BILIRUB SERPL-MCNC: 0.8 MG/DL (ref 0.1–1)
BUN SERPL-MCNC: 15 MG/DL (ref 6–20)
CALCIUM SERPL-MCNC: 7.7 MG/DL (ref 8.7–10.5)
CHLORIDE SERPL-SCNC: 105 MMOL/L (ref 95–110)
CO2 SERPL-SCNC: 26 MMOL/L (ref 23–29)
CREAT SERPL-MCNC: 0.6 MG/DL (ref 0.5–1.4)
DIFFERENTIAL METHOD: ABNORMAL
EOSINOPHIL # BLD AUTO: 0.1 K/UL (ref 0–0.5)
EOSINOPHIL NFR BLD: 4.8 % (ref 0–8)
ERYTHROCYTE [DISTWIDTH] IN BLOOD BY AUTOMATED COUNT: 16.4 % (ref 11.5–14.5)
EST. GFR  (AFRICAN AMERICAN): >60 ML/MIN/1.73 M^2
EST. GFR  (NON AFRICAN AMERICAN): >60 ML/MIN/1.73 M^2
GLUCOSE SERPL-MCNC: 115 MG/DL (ref 70–110)
HCT VFR BLD AUTO: 26.5 % (ref 37–48.5)
HGB BLD-MCNC: 8.3 G/DL (ref 12–16)
IMM GRANULOCYTES # BLD AUTO: 0.01 K/UL (ref 0–0.04)
IMM GRANULOCYTES NFR BLD AUTO: 0.4 % (ref 0–0.5)
INR PPP: 1.2 (ref 0.8–1.2)
LYMPHOCYTES # BLD AUTO: 0.6 K/UL (ref 1–4.8)
LYMPHOCYTES NFR BLD: 20.9 % (ref 18–48)
MAGNESIUM SERPL-MCNC: 1.5 MG/DL (ref 1.6–2.6)
MCH RBC QN AUTO: 30 PG (ref 27–31)
MCHC RBC AUTO-ENTMCNC: 31.3 G/DL (ref 32–36)
MCV RBC AUTO: 96 FL (ref 82–98)
MONOCYTES # BLD AUTO: 0.3 K/UL (ref 0.3–1)
MONOCYTES NFR BLD: 11.4 % (ref 4–15)
NEUTROPHILS # BLD AUTO: 1.7 K/UL (ref 1.8–7.7)
NEUTROPHILS NFR BLD: 61.4 % (ref 38–73)
NRBC BLD-RTO: 0 /100 WBC
PHOSPHATE SERPL-MCNC: 2.7 MG/DL (ref 2.7–4.5)
PLATELET # BLD AUTO: 80 K/UL (ref 150–450)
PMV BLD AUTO: 10.3 FL (ref 9.2–12.9)
POTASSIUM SERPL-SCNC: 3.9 MMOL/L (ref 3.5–5.1)
PROT SERPL-MCNC: 5.4 G/DL (ref 6–8.4)
PROTHROMBIN TIME: 12.9 SEC (ref 9–12.5)
RBC # BLD AUTO: 2.77 M/UL (ref 4–5.4)
SODIUM SERPL-SCNC: 139 MMOL/L (ref 136–145)
WBC # BLD AUTO: 2.73 K/UL (ref 3.9–12.7)

## 2022-03-05 PROCEDURE — 99233 SBSQ HOSP IP/OBS HIGH 50: CPT | Mod: ,,,

## 2022-03-05 PROCEDURE — 11000001 HC ACUTE MED/SURG PRIVATE ROOM

## 2022-03-05 PROCEDURE — 25000003 PHARM REV CODE 250: Performed by: NEUROLOGICAL SURGERY

## 2022-03-05 PROCEDURE — 63600175 PHARM REV CODE 636 W HCPCS: Performed by: NURSE PRACTITIONER

## 2022-03-05 PROCEDURE — 25000003 PHARM REV CODE 250: Performed by: STUDENT IN AN ORGANIZED HEALTH CARE EDUCATION/TRAINING PROGRAM

## 2022-03-05 PROCEDURE — 63600175 PHARM REV CODE 636 W HCPCS: Performed by: PHYSICIAN ASSISTANT

## 2022-03-05 PROCEDURE — 25000003 PHARM REV CODE 250

## 2022-03-05 PROCEDURE — 84100 ASSAY OF PHOSPHORUS: CPT | Performed by: PSYCHIATRY & NEUROLOGY

## 2022-03-05 PROCEDURE — A4216 STERILE WATER/SALINE, 10 ML: HCPCS | Performed by: NEUROLOGICAL SURGERY

## 2022-03-05 PROCEDURE — 85610 PROTHROMBIN TIME: CPT

## 2022-03-05 PROCEDURE — 85025 COMPLETE CBC W/AUTO DIFF WBC: CPT | Performed by: STUDENT IN AN ORGANIZED HEALTH CARE EDUCATION/TRAINING PROGRAM

## 2022-03-05 PROCEDURE — 80053 COMPREHEN METABOLIC PANEL: CPT

## 2022-03-05 PROCEDURE — 83735 ASSAY OF MAGNESIUM: CPT | Performed by: PSYCHIATRY & NEUROLOGY

## 2022-03-05 PROCEDURE — 99233 PR SUBSEQUENT HOSPITAL CARE,LEVL III: ICD-10-PCS | Mod: ,,,

## 2022-03-05 RX ORDER — FLUCONAZOLE 150 MG/1
150 TABLET ORAL ONCE
Status: COMPLETED | OUTPATIENT
Start: 2022-03-05 | End: 2022-03-05

## 2022-03-05 RX ADMIN — ACETAMINOPHEN 1000 MG: 325 TABLET ORAL at 06:03

## 2022-03-05 RX ADMIN — Medication 10 ML: at 06:03

## 2022-03-05 RX ADMIN — HYDROMORPHONE HYDROCHLORIDE 2 MG: 1 INJECTION, SOLUTION INTRAMUSCULAR; INTRAVENOUS; SUBCUTANEOUS at 09:03

## 2022-03-05 RX ADMIN — OXYCODONE HYDROCHLORIDE 20 MG: 10 TABLET ORAL at 11:03

## 2022-03-05 RX ADMIN — Medication 10 ML: at 12:03

## 2022-03-05 RX ADMIN — LIDOCAINE 1 PATCH: 50 PATCH CUTANEOUS at 04:03

## 2022-03-05 RX ADMIN — OXYCODONE HYDROCHLORIDE 20 MG: 10 TABLET ORAL at 02:03

## 2022-03-05 RX ADMIN — OXYCODONE HYDROCHLORIDE 20 MG: 10 TABLET ORAL at 04:03

## 2022-03-05 RX ADMIN — CEFTRIAXONE 2 G: 2 INJECTION, POWDER, FOR SOLUTION INTRAMUSCULAR; INTRAVENOUS at 02:03

## 2022-03-05 RX ADMIN — FLUCONAZOLE 150 MG: 150 TABLET ORAL at 12:03

## 2022-03-05 RX ADMIN — ACETAMINOPHEN 1000 MG: 325 TABLET ORAL at 12:03

## 2022-03-05 RX ADMIN — PANTOPRAZOLE SODIUM 40 MG: 20 TABLET, DELAYED RELEASE ORAL at 08:03

## 2022-03-05 RX ADMIN — Medication 10 ML: at 11:03

## 2022-03-05 RX ADMIN — PREGABALIN 75 MG: 75 CAPSULE ORAL at 08:03

## 2022-03-05 RX ADMIN — CEFTRIAXONE 2 G: 2 INJECTION, POWDER, FOR SOLUTION INTRAMUSCULAR; INTRAVENOUS at 06:03

## 2022-03-05 RX ADMIN — SENNOSIDES AND DOCUSATE SODIUM 1 TABLET: 50; 8.6 TABLET ORAL at 08:03

## 2022-03-05 RX ADMIN — Medication 6 MG: at 02:03

## 2022-03-05 RX ADMIN — HYDROMORPHONE HYDROCHLORIDE 2 MG: 1 INJECTION, SOLUTION INTRAMUSCULAR; INTRAVENOUS; SUBCUTANEOUS at 02:03

## 2022-03-05 RX ADMIN — OXYCODONE HYDROCHLORIDE 20 MG: 10 TABLET ORAL at 06:03

## 2022-03-05 RX ADMIN — OXYCODONE HYDROCHLORIDE 10 MG: 10 TABLET ORAL at 08:03

## 2022-03-05 RX ADMIN — PREGABALIN 75 MG: 75 CAPSULE ORAL at 04:03

## 2022-03-05 RX ADMIN — SERTRALINE HYDROCHLORIDE 50 MG: 50 TABLET ORAL at 08:03

## 2022-03-05 RX ADMIN — Medication 6 MG: at 08:03

## 2022-03-05 RX ADMIN — ACETAMINOPHEN 1000 MG: 325 TABLET ORAL at 11:03

## 2022-03-05 NOTE — NURSING TRANSFER
Nursing Transfer Note      3/5/2022     Reason patient is being transferred: step down    Transfer To:     Transfer via bed    Transfer with wound vac    Transported by Gardenia Parrish, RN and Ella Malhotra RN    Medicines sent: n/a    Any special needs or follow-up needed: no    Chart send with patient: Yes    Notified: pt notified boyfriend    Patient reassessed at: 3/5/22 1509 (date, time)    Upon arrival to floor: patient oriented to room      All pt belongings sent with pt.

## 2022-03-05 NOTE — ASSESSMENT & PLAN NOTE
U/S abdomen with doppler showed cirrhosis and portal hypertension satisfactory Doppler evaluation   MELD-Na score: 8 at 3/5/2022  2:26 AM  MELD score: 8 at 3/5/2022  2:26 AM  Calculated from:  Serum Creatinine: 0.6 mg/dL (Using min of 1 mg/dL) at 3/5/2022  2:26 AM  Serum Sodium: 139 mmol/L (Using max of 137 mmol/L) at 3/5/2022  2:26 AM  Total Bilirubin: 0.8 mg/dL (Using min of 1 mg/dL) at 3/5/2022  2:26 AM  INR(ratio): 1.2 at 3/5/2022  2:26 AM  Age: 41 years    --daily CMP and INR  --will need hepatology referral at time of discharge  --Lipase normal

## 2022-03-05 NOTE — PLAN OF CARE
"Ms. Zazueta is 42 y/o F with PMH of cirrhosis, Hep C, pancytopenia, polysubstance abuse, and epidural abscess s/p L3-L4 laminectomy and L3-L5 TLIF (4/2021; blood and surgical cultures positive for group B strep) admitted from [facility] with progressive lumbar spondylodiscitis and hardware failure. IM6 consulted for preop risk stratification and medical optimization.      Pt looks well, A&Ox4. Reports some left leg cramp is better today. Chart reviewed. S/p washout and hardware removal 2/21. Plan for reinstrumentation on 3/2. She may need platelet and PRBCs transfusion before the surgery tomorrow.      MELD-Na score: 10 at 3/4/2022 12:09 AM  MELD score: 10 at 3/4/2022 12:09 AM  Calculated from:  Serum Creatinine: 0.6 mg/dL (Using min of 1 mg/dL) at 3/4/2022 12:09 AM  Serum Sodium: 136 mmol/L at 3/4/2022 12:09 AM  Total Bilirubin: 1.3 mg/dL at 3/4/2022 12:09 AM  INR(ratio): 1.3 at 3/4/2022 12:09 AM  Age: 41 years     - RCRI 0   - blood and biopsy cultures negative so far  - surgical cultures negative so far   - continue rocephin per ID recs, PICC placed   - History of leukopenia, today WBC are 1.93 with ANC of 1300. Continue to trend CBC.  - daily CMP, INR   - will need outpatient hepatology referral    - recommend placement on discharge for continued IV abx given hx of drug use and noncompliance with previous IV abx (per ID will likely need 8 weeks of antibiotics from day of surgery)     Please secure [facility] ("Medicine Consult Only") or contact me directly for any additional questions or concerns      Simi Argueta MD  Internal Medicine, PGYIII  "

## 2022-03-05 NOTE — PLAN OF CARE
"Ms. Zazueta is 40 y/o F with PMH of cirrhosis, Hep C, pancytopenia, polysubstance abuse, and epidural abscess s/p L3-L4 laminectomy and L3-L5 TLIF (4/2021; blood and surgical cultures positive for group B strep) admitted from [facility] with progressive lumbar spondylodiscitis and hardware failure. IM6 consulted for preop risk stratification and medical optimization for liver cirrhosis.        MELD-Na score: 8 at 3/5/2022  2:26 AM  MELD score: 8 at 3/5/2022  2:26 AM  Calculated from:  Serum Creatinine: 0.6 mg/dL (Using min of 1 mg/dL) at 3/5/2022  2:26 AM  Serum Sodium: 139 mmol/L (Using max of 137 mmol/L) at 3/5/2022  2:26 AM  Total Bilirubin: 0.8 mg/dL (Using min of 1 mg/dL) at 3/5/2022  2:26 AM  INR(ratio): 1.2 at 3/5/2022  2:26 AM  Age: 41 years       - continue rocephin per ID recs, PICC placed   - daily CMP, INR   - will need outpatient hepatology referral    - recommend placement on discharge for continued IV abx given hx of drug use and noncompliance with previous IV abx      Please secure chat [facility]  ("Medicine Consult Only") or contact me directly for any additional questions or concerns       Simi Argueta MD  Internal Medicine, PGYIII  "

## 2022-03-05 NOTE — ASSESSMENT & PLAN NOTE
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    Patient underwent L3-4 intervertebral disc biopsy on 2/15. She is s/p hardware removal on 2/21 though cages retained L3-4 and L4-5.  Patient was on empiric Vancomycin and Ceftriaxone - now rocephin. IR/OR cultures and blood cx are NGTD. Afebrile. HDS.  On high dose ceftriaxone given cultures from epidural space. Patient hardware re-implantation on march 2.  Surgical cultures negative X 48 hours.  Will treat with 6 weeks of IV ceftriaxone followed by long term oral suppression with keflex.      Outpatient Antibiotic Therapy Plan:    Please send referral to [facility].    1) Infection: Group B strep epidural abscess and discitis.    2) Discharge Antibiotics:    Intravenous antibiotics:   Ceftriaxone 2 grams IV q 12 hours     3) Therapy Duration:  6 weeks    Estimated end date of IV antibiotics: April 13    4) Outpatient Weekly Labs:    Order the following labs to be drawn on Mondays:    CBC   CMP    CRP    If vancomycin trough is not at target (15-20) prior to discharge, schedule vancomycin trough to be drawn before their fourth outpatient dose.    5) Fax Lab Results to Infectious Diseases Provider: Dr. Bellamy    [facility] Fax Number: [phone]    6) Outpatient Infectious Diseases Follow-up     Follow-up appointment will be arranged by the ID clinic and will be found in the patient's appointments tab.     Prior to discharge, please ensure the patient's follow-up has been scheduled.     If there is still no follow-up scheduled prior to discharge, please send an EPIC message to Rebekah Bonds in Infectious Diseases.

## 2022-03-05 NOTE — PLAN OF CARE
[facility] Care Plan    POC reviewed with Rachel Zazueta and family at 0430. Pt verbalized understanding. Questions and concerns addressed. No acute events overnight. Pt progressing toward goals.     Oxy x3; Dilaudid x1 for back pain.  SBP maintained <180 w/o intervention  R drain output: 5 cc/shift  L drain output: 40 cc/shift  Transfer orders placed. Awaiting bed.     Will continue to monitor. See below and flowsheets for full assessment and VS info.       Neuro:  Jane Coma Scale  Best Eye Response: 4-->(E4) spontaneous  Best Motor Response: 6-->(M6) obeys commands  Best Verbal Response: 5-->(V5) oriented  Jane Coma Scale Score: 15  Assessment Qualifiers: patient not sedated/intubated, no eye obstruction present  Pupil PERRLA: yes     24hr Temp:  [97.9 °F (36.6 °C)-98.4 °F (36.9 °C)]     CV:   Rhythm: normal sinus rhythm  BP goals:   SBP < 180  MAP > 65    Resp:   O2 Device (Oxygen Therapy): room air       Plan: N/A    GI/:     Diet/Nutrition Received: regular  Last Bowel Movement: 03/01/22  Voiding Characteristics: external catheter    Intake/Output Summary (Last 24 hours) at 3/5/2022 0534  Last data filed at 3/5/2022 0502  Gross per 24 hour   Intake 1584.13 ml   Output 1100 ml   Net 484.13 ml     Unmeasured Output  Urine Occurrence: 1  Stool Occurrence: 0  Pad Count: 1    Labs/Accuchecks:  Recent Labs   Lab 03/05/22 0226   WBC 2.73*   RBC 2.77*   HGB 8.3*   HCT 26.5*   PLT 80*      Recent Labs   Lab 03/05/22 0226      K 3.9   CO2 26      BUN 15   CREATININE 0.6   ALKPHOS 98   ALT 25   AST 49*   BILITOT 0.8      Recent Labs   Lab 03/05/22 0226   INR 1.2    No results for input(s): CPK, CPKMB, TROPONINI, MB in the last 168 hours.    Electrolytes: N/A - electrolytes WDL  Accuchecks: none    Gtts:   sodium chloride 0.9% Stopped (03/03/22 1708)       LDA/Wounds:  Lines/Drains/Airways       Peripherally Inserted Central Catheter Line  Duration             PICC Double Lumen 02/22/22 1110 right  basilic 10 days              Drain  Duration                  Closed/Suction Drain 03/02/22 1208 Left;Superior Back Accordion 10 Fr. 2 days         Closed/Suction Drain 03/02/22 1208 Right;Superior Back Accordion 10 Fr. 2 days                  Wounds: Yes  Wound care consulted: No       Problem: Adult Inpatient Plan of Care  Goal: Optimal Comfort and Wellbeing  Outcome: Ongoing, Progressing     Problem: Adult Inpatient Plan of Care  Goal: Readiness for Transition of Care  Outcome: Ongoing, Progressing     Problem: Infection  Goal: Absence of Infection Signs and Symptoms  Outcome: Ongoing, Progressing

## 2022-03-05 NOTE — PROGRESS NOTES
[facility]  Neurosurgery  Progress Note    Subjective:     History of Present Illness: Rachel Zazueta is a 41 y.o.  female with PMHx of Hepatitis C, liver cirrhosis, pancytopenia, and polysubstance abuse, who presents to clinic for follow up with new imaging s/p L3 and L4 laminectomy for evacuation of epidural abscess and L3-L5 TLIF on 04/16/2021.     The pt c/o worsening back and left leg pain since October. She states that she is no longer using IV drugs. States only using marijuana. Describes the left leg pain as a shock down the leg when standing for 10 minutes. Denies taking any antibiotics. Endorses new weakness in the legs. Denies b/b dysfunction. Denies new neck pain. She ambulates with a walker at home. States that she smoke.    She presents to ED as direct admit from clinic.       Post-Op Info:  Procedure(s) (LRB):  FUSION, SPINE, LUMBAR (Bilateral)   3 Days Post-Op     Interval History: Interval History: 3/5: POD 3. NAEON. AFVSS. Exam stable. Pain improved. Saturating well on room air. Tolerating PO without n/v. Adequate UOP.     Medications:  Continuous Infusions:   sodium chloride 0.9% Stopped (03/03/22 1708)     Scheduled Meds:   acetaminophen  1,000 mg Oral Q6H    cefTRIAXone (ROCEPHIN) IVPB  2 g Intravenous Q12H    fluconazole  150 mg Oral Once    LIDOcaine  1 patch Transdermal Q24H    pantoprazole  40 mg Oral Daily    pregabalin  75 mg Oral TID    senna-docusate 8.6-50 mg  1 tablet Oral Daily    sertraline  50 mg Oral Daily    sodium chloride 0.9%  10 mL Intravenous Q6H     PRN Meds:sodium chloride, aluminum-magnesium hydroxide-simethicone, dextrose 10%, dextrose 10%, glucagon (human recombinant), glucose, glucose, HYDROmorphone, melatonin, oxyCODONE, oxyCODONE, polyethylene glycol, Flushing PICC Protocol **AND** sodium chloride 0.9% **AND** sodium chloride 0.9%     Objective:     Weight: 133.8 kg (294 lb 15.6 oz)  Body mass index is 46.2 kg/m².  Vital Signs (Most  Recent):  Temp: 97.7 °F (36.5 °C) (03/05/22 1100)  Pulse: 94 (03/05/22 1100)  Resp: 15 (03/05/22 1100)  BP: 123/73 (03/05/22 1100)  SpO2: 100 % (03/05/22 1100) Vital Signs (24h Range):  Temp:  [97.7 °F (36.5 °C)-98.6 °F (37 °C)] 97.7 °F (36.5 °C)  Pulse:  [] 94  Resp:  [10-27] 15  SpO2:  [72 %-100 %] 100 %  BP: (118-134)/(56-77) 123/73                          Closed/Suction Drain 02/21/22 1644 Left;Medial Back Accordion 10 Fr. (Active)   Site Description Healing 02/28/22 2053   Dressing Type Other (Comment) 02/28/22 2053   Dressing Status Intact 02/27/22 2048   Dressing Intervention Integrity maintained 02/28/22 1800   Drainage Serosanguineous 02/28/22 2053   Status To bulb suction 02/28/22 2053   Output (mL) 100 mL 03/01/22 0500            Closed/Suction Drain 02/21/22 1645 Right;Medial Back Accordion 10 Fr. (Active)   Site Description Healing 02/28/22 2053   Dressing Type Other (Comment) 02/28/22 2053   Dressing Status Intact 02/28/22 1800   Dressing Intervention Integrity maintained 02/28/22 1800   Drainage Serosanguineous 02/28/22 2053   Status To bulb suction 02/28/22 2053   Output (mL) 20 mL 03/01/22 0500       Female External Urinary Catheter 02/22/22 2300 (Active)   Skin no redness;no breakdown 02/28/22 2053   Tolerance no signs/symptoms of discomfort 02/28/22 2053   Suction Continuous suction at 40 mmHg 02/28/22 2053   Date of last wick change 02/26/22 02/28/22 2053   Time of last wick change 2000 02/26/22 2000   Output (mL) 500 mL 02/27/22 0701     Neurosurgery Physical Exam  General: Well developed, well nourished, not in acute distress.   Head: Normocephalic, atraumatic.  Neck: Full ROM.   Neurologic: Alert and oriented x 4. Thought content appropriate.  GCS: Motor:6  Verbal:5  Eyes:4   GCS Total: 15  Language: No aphasia.  Speech: No dysarthria.  Cranial nerves: Face symmetric, tongue midline, CN II-XII grossly intact.   Eyes: Pupils equal, round, reactive to light with accomodation, EOMI.    Pulmonary: Normal respirations, no signs of respiratory distress.  Abdomen: Soft, non-distended, non-tender to palpation.  Sensory: Intact to light touch throughout.  Motor Strength: Moves all extremities spontaneously with good tone. No abnormal movements seen.      Strength   Deltoids Triceps Biceps Wrist Extension Wrist Flexion Hand    Upper: R 5/5 5/5 5/5 5/5 5/5 5/5     L 5/5 5/5 5/5 5/5 5/5 5/5       Hip Flexion Knee Extension Knee  Flexion Dorsiflexion Plantar flexion EHL   Lower: R 5/5 5/5 5/5 5/5 5/5 5/5     L 4/5 4/5 4/5 5/5 5/5 5/5      Parker: Absent.  Clonus: 3 beats bilaterally.  Skin: Skin is warm, dry and intact.     Incision c/d/i with staples. No surrounding erythema or edema. No drainage from incision. No palpable hematoma or underlying fluid collection.     HV drains in place.    Significant Labs:  Recent Labs   Lab 03/04/22 0009 03/05/22 0226   * 115*    139   K 4.0 3.9    105   CO2 24 26   BUN 17 15   CREATININE 0.6 0.6   CALCIUM 7.8* 7.7*   MG 1.6 1.5*     Recent Labs   Lab 03/03/22 1916 03/04/22 0009 03/05/22 0226   WBC 4.60 3.10* 2.73*   HGB 8.2* 8.1* 8.3*   HCT 25.2* 25.0* 26.5*   PLT 79* 77* 80*     Recent Labs   Lab 03/04/22 0009 03/05/22 0226   INR 1.3* 1.2     Microbiology Results (last 7 days)       Procedure Component Value Units Date/Time    Aerobic culture [[id number]] Collected: 03/02/22 1228    Order Status: Completed Specimen: Back Updated: 03/05/22 1108     Aerobic Bacterial Culture No growth    Narrative:      L5/S1 DISC    Culture, Anaerobe [[id number]] Collected: 03/02/22 1228    Order Status: Completed Specimen: Back Updated: 03/04/22 0859     Anaerobic Culture Culture in progress    Narrative:      L5/S1 DISC    AFB Culture & Smear [[id number]] Collected: 03/02/22 1228    Order Status: Completed Specimen: Back Updated: 03/03/22 2127     AFB Culture & Smear Culture in progress     AFB CULTURE STAIN No acid fast bacilli seen.    Narrative:       L5/S1 DISC    Fungus culture [[id number]] Collected: 02/21/22 1539    Order Status: Completed Specimen: Abscess from Back Updated: 03/02/22 1435     Fungus (Mycology) Culture Culture in progress    Narrative:      Epidural purulence #1    Fungus culture [[id number]] Collected: 02/21/22 1539    Order Status: Completed Specimen: Abscess from Back Updated: 03/02/22 1435     Fungus (Mycology) Culture Culture in progress    Narrative:      Epidural purulence #2    Gram stain [[id number]] Collected: 03/02/22 1228    Order Status: Completed Specimen: Back Updated: 03/02/22 1423     Gram Stain Result No WBC's      No organisms seen    Narrative:      L5/S1 DISC    Fungus culture [[id number]] Collected: 03/02/22 1228    Order Status: Sent Specimen: Back Updated: 03/02/22 1251    Fungus culture [[id number]] Collected: 02/15/22 1128    Order Status: Completed Specimen: Biopsy from Back Updated: 03/02/22 1028     Fungus (Mycology) Culture Culture in progress      No fungus isolated after 2 weeks    Narrative:      L3-4 / L4-5 osteodiscitis          All pertinent labs from the last 24 hours have been reviewed.    Significant Diagnostics:  I have reviewed and interpreted all pertinent imaging results/findings within the past 24 hours.  No results found in the last 24 hours.        Assessment/Plan:     * Spondylodiscitis  Rachel Zazueta is a 41 F with PMH hepatitis C, cirrhosis, pancytopenia, nicotine abuse, hx IVDU (last use 2 years ago), strep agalactae bacteremia, s/p L3-5 TLIF on 4/16/2021 who presents with persistent and progressive lumbar spondylodiscitis with resultant hardware failure and new scoliosis.     S/p hardware removal on 2/21. Now s/p T10 t pelvis posterior instrumented fusion with L5-S1 TLIF on 3/2.     Plan:  --Patient PCU status.       -q2h neurochecks on PCU  --Post op XR Pending  --All labs and diagnostics personally reviewed  --Follow-up MRI Brain, C/T/L w/wo contrast:   -No evidence of osteomyelitis,  discitis, or epidural abscess within the cervical or thoracic spine   -No diffusion positive or abnormal enhancement within the brain   -MRI brain shows patchy nonspecific increased T2 and FLAIR signal in the periventricular and subcortical white matter bilaterally. Could be due to chronic microvascular ischemic changes vs demyelinating disease given patient's age. No evidence of active demyelination.   -DDD C4-7 with mild to moderate canal stenosis  --Full infectious w/u.   -Inflammatory markers wnl. BCx 2/14 NGTD, UA/Ucx pending. Wound Cx pending.   -F/u ID recs - now on Rocephin. PICC placed.   --S/p L3-4 disc biopsy and L3 bone biopsy with IR 2/15 - Gram stain rare WBC. Cx NGTD.  --Utox + for amphetamines, addiction psych consulted per .   --Hold anti-plt/coag medications.  --Monitor for urinary retention - voiding spontaneously. Bladder scan prn.  --Pain control: Oxy 10 q4h PRN for moderate pain, Oxy 15 q4h PRN for severe pain, Dilaudid 1 mg q4h PRN when pain not relieved by PO meds. Continue lyrica 50 mg TID.  --Drains: Keep, continue IV abx while in place. High output, will switch to gravity.  --Plts 80 today following transfusion yesterday; goal > 80   --DVT ppx: TEDs/SCDs  --Bowel regimen: Miralax, senna, lactulose 2/23. + BM.  --Activity: PT/OT OOB, TLSO brace when OOB.  --Continue to monitor clinically, notify NSGY immediately with any changes in neuro status.    Discussed with Dr. Templeton.    Dispo: PTOT OOB today. Anticipate rehab.        Lee Patel MD  Neurosurgery  [facility]

## 2022-03-05 NOTE — ASSESSMENT & PLAN NOTE
-Patient complained of worsening vaginal itchiness in setting of abx  -States she often experiences yeast infections when on abx  -Diflucan 150 x 1

## 2022-03-05 NOTE — PROGRESS NOTES
[facility]  Infectious Disease  Progress Note    Patient Name: Rachel Zazueta  MRN: [id number]  Admission Date: 2/14/2022  Length of Stay: 18 days  Attending Physician: Guille Templeton MD  Primary Care Provider: Dannielle Merino DO    Isolation Status: No active isolations  Assessment/Plan:      * Spondylodiscitis  41 year old female with remote hx of IVDU, GBS bacteremia, L3-4 osteodiscitis & epidural abscess s/p washout and fusion 4/2021 who [place] [facility] [place] pain since October and admitted after imaging showed worsened L3-4 osteodiscitis with extension to L4-5 along with hardware loosening.    Patient underwent L3-4 intervertebral disc biopsy on 2/15. She is s/p hardware removal on 2/21 though cages retained L3-4 and L4-5.  Patient was on empiric Vancomycin and Ceftriaxone - now rocephin. IR/OR cultures and blood cx are NGTD. Afebrile. HDS.  On high dose ceftriaxone given cultures from epidural space. Patient hardware re-implantation on march 2.  Surgical cultures negative X 48 hours.  Will treat with 6 weeks of IV ceftriaxone followed by long term oral suppression with keflex.  Given her prior non-compliance, I would prefer she gets her IV antibiotics at a rehab or SNF facility and not at home.      Outpatient Antibiotic Therapy Plan:    Please send referral to [facility].    1) Infection: Group B strep epidural abscess and discitis.    2) Discharge Antibiotics:    Intravenous antibiotics:   Ceftriaxone 2 grams IV q 12 hours     3) Therapy Duration:  6 weeks    Estimated end date of IV antibiotics: April 13    4) Outpatient Weekly Labs:    Order the following labs to be drawn on Mondays:    CBC   CMP    CRP    If vancomycin trough is not at target (15-20) prior to discharge, schedule vancomycin trough to be drawn before their fourth outpatient dose.    5) Fax Lab Results to Infectious Diseases Provider: Dr. Bellamy    [facility] ID  Clinic Fax Number: [phone]    6) Outpatient Infectious Diseases Follow-up     Follow-up appointment will be arranged by the ID clinic and will be found in the patient's appointments tab.     Prior to discharge, please ensure the patient's follow-up has been scheduled.     If there is still no follow-up scheduled prior to discharge, please send an EPIC message to Rebekah Bonds in Infectious Diseases.              Chronic hepatitis C with cirrhosis  History of HCV. Needs Hepatology follow up after discharge for further evaluation and treatment.        Anticipated Disposition: TBD    Thank you for your consult. I will sign off. Please contact us if you have any additional questions.    Efren Bellamy MD  Infectious Disease  [facility] - Neuro Critical Care    Subjective:     Principal Problem:Spondylodiscitis    HPI: Rachel Zazueta is a 41 year old female with HCV, liver, cirrhosis, anemia, and polysubstance abuse admitted in April 2021 with GBS bacteremia and L3-4 spondylodiscitis with associated epidural abscess s/p posterior spinal fusion and evacuation of epidural abscess on 4/16.  She was treated with Ceftriaxone with a plan to complete 8 weeks of antibiotics (end date 6/11/21). Initially at [facility] and  discharged from Rehab with a PICC line to finish antibiotics at home. Patient reports only completing 4-5 weeks of IV antibiotics as her PICC line accidentally fell at at home. She did not inform ID or go to the ED for replacement as her back pain had resolved and she felt well. She missed her last ID appt.    In October her back pain returned and radiates down her left leg. She was seen in NSGY 2/14 for this.  CT ordered reviewed and showed increased destructive endplate changes at L3-4, new destructive endplate changes at L4-5, and worsening alignment abnormalities, with adjacent soft tissue edema and lymphadenopathy, highly concerning for discitis osteomyelitis. New lucencies at the bone-metal  interfaces of all pedicle screws and both interbody spacers, concerning for loosening. Patient was admitted for further evaluation and treatment.    Patient afebrile without a leukocytosis. Sed rate 57/CRP 29. She stated that she is no longer using IV drugs and only using marijuana.  Denies recent abx use.    MRI C/T/L spine - Suspected discitis/osteomyelitis of the lumbar spine at L3-4 and L4-5 with postoperative changes of posterior fusion from L3 through L5 with probable hardware infection.  IR consulted and performed L3-4 intervertebral disc biopsy. Cx are negative.         Interval History:     No adverse events    Review of Systems   Musculoskeletal:  Positive for back pain.   All other systems reviewed and are negative.  Objective:     Vital Signs (Most Recent):  Temp: 98 °F (36.7 °C) (03/04/22 1315)  Pulse: 101 (03/04/22 1700)  Resp: 14 (03/04/22 2122)  BP: 121/66 (03/04/22 1700)  SpO2: 97 % (03/04/22 1700) Vital Signs (24h Range):  Temp:  [97.9 °F (36.6 °C)-98.4 °F (36.9 °C)] 98 °F (36.7 °C)  Pulse:  [] 101  Resp:  [9-27] 14  SpO2:  [96 %-100 %] 97 %  BP: (115-151)/(56-85) 121/66  Arterial Line BP: ()/() 176/105     Weight: 133.8 kg (294 lb 15.6 oz)  Body mass index is 46.2 kg/m².    Estimated Creatinine Clearance: 176.3 mL/min (based on SCr of 0.6 mg/dL).    Physical Exam  Constitutional:       General: She is not in acute distress.     Appearance: She is well-developed. She is not diaphoretic.   HENT:      Head: Normocephalic and atraumatic.   Cardiovascular:      Rate and Rhythm: Normal rate and regular rhythm.      Heart sounds: Normal heart sounds. No murmur heard.    No friction rub. No gallop.   Pulmonary:      Effort: Pulmonary effort is normal. No respiratory distress.      Breath sounds: Normal breath sounds. No wheezing or rales.      Comments: Anterior exam as patient with back pain  Abdominal:      General: Bowel sounds are normal. There is no distension.      Palpations:  Abdomen is soft. There is no mass.      Tenderness: There is no abdominal tenderness. There is no guarding or rebound.   Skin:     General: Skin is warm and dry.   Neurological:      Mental Status: She is alert and oriented to person, place, and time.   Psychiatric:         Behavior: Behavior normal.       Significant Labs:   Microbiology Results (last 7 days)       Procedure Component Value Units Date/Time    Culture, Anaerobe [[id number]] Collected: 03/02/22 1228    Order Status: Completed Specimen: Back Updated: 03/04/22 0859     Anaerobic Culture Culture in progress    Narrative:      L5/S1 DISC    AFB Culture & Smear [[id number]] Collected: 03/02/22 1228    Order Status: Completed Specimen: Back Updated: 03/03/22 2127     AFB Culture & Smear Culture in progress     AFB CULTURE STAIN No acid fast bacilli seen.    Narrative:      L5/S1 DISC    Aerobic culture [[id number]] Collected: 03/02/22 1228    Order Status: Completed Specimen: Back Updated: 03/03/22 0634     Aerobic Bacterial Culture No growth    Narrative:      L5/S1 DISC    Fungus culture [[id number]] Collected: 02/21/22 1539    Order Status: Completed Specimen: Abscess from Back Updated: 03/02/22 1435     Fungus (Mycology) Culture Culture in progress    Narrative:      Epidural purulence #1    Fungus culture [[id number]] Collected: 02/21/22 1539    Order Status: Completed Specimen: Abscess from Back Updated: 03/02/22 1435     Fungus (Mycology) Culture Culture in progress    Narrative:      Epidural purulence #2    Gram stain [[id number]] Collected: 03/02/22 1228    Order Status: Completed Specimen: Back Updated: 03/02/22 1423     Gram Stain Result No WBC's      No organisms seen    Narrative:      L5/S1 DISC    Fungus culture [[id number]] Collected: 03/02/22 1228    Order Status: Sent Specimen: Back Updated: 03/02/22 1251    Fungus culture [[id number]] Collected: 02/15/22 1128    Order Status: Completed Specimen: Biopsy from Back Updated: 03/02/22 1028      Fungus (Mycology) Culture Culture in progress      No fungus isolated after 2 weeks    Narrative:      L3-4 / L4-5 osteodiscitis            Significant Imaging: I have reviewed all pertinent imaging results/findings within the past 24 hours.

## 2022-03-05 NOTE — SUBJECTIVE & OBJECTIVE
Interval History:     No adverse events    Review of Systems   Musculoskeletal:  Positive for back pain.   All other systems reviewed and are negative.  Objective:     Vital Signs (Most Recent):  Temp: 98 °F (36.7 °C) (03/04/22 1315)  Pulse: 101 (03/04/22 1700)  Resp: 14 (03/04/22 2122)  BP: 121/66 (03/04/22 1700)  SpO2: 97 % (03/04/22 1700) Vital Signs (24h Range):  Temp:  [97.9 °F (36.6 °C)-98.4 °F (36.9 °C)] 98 °F (36.7 °C)  Pulse:  [] 101  Resp:  [9-27] 14  SpO2:  [96 %-100 %] 97 %  BP: (115-151)/(56-85) 121/66  Arterial Line BP: ()/() 176/105     Weight: 133.8 kg (294 lb 15.6 oz)  Body mass index is 46.2 kg/m².    Estimated Creatinine Clearance: 176.3 mL/min (based on SCr of 0.6 mg/dL).    Physical Exam  Constitutional:       General: She is not in acute distress.     Appearance: She is well-developed. She is not diaphoretic.   HENT:      Head: Normocephalic and atraumatic.   Cardiovascular:      Rate and Rhythm: Normal rate and regular rhythm.      Heart sounds: Normal heart sounds. No murmur heard.    No friction rub. No gallop.   Pulmonary:      Effort: Pulmonary effort is normal. No respiratory distress.      Breath sounds: Normal breath sounds. No wheezing or rales.      Comments: Anterior exam as patient with back pain  Abdominal:      General: Bowel sounds are normal. There is no distension.      Palpations: Abdomen is soft. There is no mass.      Tenderness: There is no abdominal tenderness. There is no guarding or rebound.   Skin:     General: Skin is warm and dry.   Neurological:      Mental Status: She is alert and oriented to person, place, and time.   Psychiatric:         Behavior: Behavior normal.       Significant Labs:   Microbiology Results (last 7 days)       Procedure Component Value Units Date/Time    Culture, Anaerobe [[id number]] Collected: 03/02/22 1228    Order Status: Completed Specimen: Back Updated: 03/04/22 0823     Anaerobic Culture Culture in progress    Narrative:       L5/S1 DISC    AFB Culture & Smear [[id number]] Collected: 03/02/22 1228    Order Status: Completed Specimen: Back Updated: 03/03/22 2127     AFB Culture & Smear Culture in progress     AFB CULTURE STAIN No acid fast bacilli seen.    Narrative:      L5/S1 DISC    Aerobic culture [[id number]] Collected: 03/02/22 1228    Order Status: Completed Specimen: Back Updated: 03/03/22 0634     Aerobic Bacterial Culture No growth    Narrative:      L5/S1 DISC    Fungus culture [[id number]] Collected: 02/21/22 1539    Order Status: Completed Specimen: Abscess from Back Updated: 03/02/22 1435     Fungus (Mycology) Culture Culture in progress    Narrative:      Epidural purulence #1    Fungus culture [[id number]] Collected: 02/21/22 1539    Order Status: Completed Specimen: Abscess from Back Updated: 03/02/22 1435     Fungus (Mycology) Culture Culture in progress    Narrative:      Epidural purulence #2    Gram stain [[id number]] Collected: 03/02/22 1228    Order Status: Completed Specimen: Back Updated: 03/02/22 1423     Gram Stain Result No WBC's      No organisms seen    Narrative:      L5/S1 DISC    Fungus culture [[id number]] Collected: 03/02/22 1228    Order Status: Sent Specimen: Back Updated: 03/02/22 1251    Fungus culture [[id number]] Collected: 02/15/22 1128    Order Status: Completed Specimen: Biopsy from Back Updated: 03/02/22 1028     Fungus (Mycology) Culture Culture in progress      No fungus isolated after 2 weeks    Narrative:      L3-4 / L4-5 osteodiscitis            Significant Imaging: I have reviewed all pertinent imaging results/findings within the past 24 hours.

## 2022-03-05 NOTE — ASSESSMENT & PLAN NOTE
Rachel Zazueta is a 41 F with PMH hepatitis C, cirrhosis, pancytopenia, nicotine abuse, hx IVDU (last use 2 years ago), strep agalactae bacteremia, s/p L3-5 TLIF on 4/16/2021 who presents with persistent and progressive lumbar spondylodiscitis with resultant hardware failure and new scoliosis.     S/p hardware removal on 2/21. Now s/p T10 t pelvis posterior instrumented fusion with L5-S1 TLIF on 3/2.     Plan:  --Patient PCU status.       -q2h neurochecks on PCU  --Post op XR Pending  --All labs and diagnostics personally reviewed  --Follow-up MRI Brain, C/T/L w/wo contrast:   -No evidence of osteomyelitis, discitis, or epidural abscess within the cervical or thoracic spine   -No diffusion positive or abnormal enhancement within the brain   -MRI brain shows patchy nonspecific increased T2 and FLAIR signal in the periventricular and subcortical white matter bilaterally. Could be due to chronic microvascular ischemic changes vs demyelinating disease given patient's age. No evidence of active demyelination.   -DDD C4-7 with mild to moderate canal stenosis  --Full infectious w/u.   -Inflammatory markers wnl. BCx 2/14 NGTD, UA/Ucx pending. Wound Cx pending.   -F/u ID recs - now on Rocephin. PICC placed.   --S/p L3-4 disc biopsy and L3 bone biopsy with IR 2/15 - Gram stain rare WBC. Cx NGTD.  --Utox + for amphetamines, addiction psych consulted per HM.   --Hold anti-plt/coag medications.  --Monitor for urinary retention - voiding spontaneously. Bladder scan prn.  --Pain control: Oxy 10 q4h PRN for moderate pain, Oxy 15 q4h PRN for severe pain, Dilaudid 1 mg q4h PRN when pain not relieved by PO meds. Continue lyrica 50 mg TID.  --Drains: Keep, continue IV abx while in place. High output, will switch to gravity.  --Plts 80 today following transfusion yesterday; goal > 80   --DVT ppx: TEDs/SCDs  --Bowel regimen: Miralax, senna, lactulose 2/23. + BM.  --Activity: PT/OT OOB, TLSO brace when OOB.  --Continue to monitor  clinically, notify NSGY immediately with any changes in neuro status.    Discussed with Dr. Templeton.    Dispo: PTOT OOB today. Anticipate rehab.

## 2022-03-05 NOTE — SUBJECTIVE & OBJECTIVE
Interval History:  See above.     Review of Systems   Constitutional:  Negative for fatigue.   Gastrointestinal:  Negative for diarrhea and vomiting.   Musculoskeletal:  Positive for back pain. Negative for gait problem.   Skin:  Negative for wound.     Objective:     Vitals:  Temp: 97.7 °F (36.5 °C)  Pulse: 94  Rhythm: normal sinus rhythm  BP: 123/73  MAP (mmHg): 91  Resp: 15  SpO2: 100 %  O2 Device (Oxygen Therapy): room air    Temp  Min: 97.7 °F (36.5 °C)  Max: 98.6 °F (37 °C)  Pulse  Min: 91  Max: 120  BP  Min: 118/56  Max: 134/63  MAP (mmHg)  Min: 80  Max: 96  Resp  Min: 10  Max: 27  SpO2  Min: 72 %  Max: 100 %    03/04 0701 - 03/05 0700  In: 1514.1 [P.O.:190; I.V.:15]  Out: 1245 [Urine:1100; Drains:145]   Unmeasured Output  Urine Occurrence: 1  Stool Occurrence: 0  Pad Count: 1       Physical Exam  Vitals and nursing note reviewed.   Constitutional:       Appearance: She is obese.   HENT:      Head: Normocephalic.      Nose: Nose normal.      Mouth/Throat:      Mouth: Mucous membranes are moist.   Eyes:      Extraocular Movements: Extraocular movements intact.      Pupils: Pupils are equal, round, and reactive to light.   Cardiovascular:      Rate and Rhythm: Normal rate.   Pulmonary:      Effort: No respiratory distress.   Abdominal:      General: There is distension.      Tenderness: There is no abdominal tenderness.   Musculoskeletal:         General: Normal range of motion.   Neurological:      Mental Status: She is alert.      GCS: GCS eye subscore is 4. GCS verbal subscore is 5. GCS motor subscore is 6.      Cranial Nerves: No cranial nerve deficit.      Sensory: No sensory deficit.      Motor: Weakness present.      Comments: Moving all 4 extremities spontaneously, 5/5 strength throughout  W/D to pain to bl lower extremities.   No saddle anesthesia noted.          Medications:  Continuoussodium chloride 0.9%, Last Rate: Stopped (03/03/22 9248)    Scheduledacetaminophen, 1,000 mg, Q6H  cefTRIAXone  (ROCEPHIN) IVPB, 2 g, Q12H  fluconazole, 150 mg, Once  LIDOcaine, 1 patch, Q24H  pantoprazole, 40 mg, Daily  pregabalin, 75 mg, TID  senna-docusate 8.6-50 mg, 1 tablet, Daily  sertraline, 50 mg, Daily  sodium chloride 0.9%, 10 mL, Q6H    PRNsodium chloride, , Q24H PRN  aluminum-magnesium hydroxide-simethicone, 30 mL, Q6H PRN  dextrose 10%, 12.5 g, PRN  dextrose 10%, 25 g, PRN  glucagon (human recombinant), 1 mg, PRN  glucose, 16 g, PRN  glucose, 24 g, PRN  HYDROmorphone, 2 mg, Q3H PRN  melatonin, 6 mg, Nightly PRN  oxyCODONE, 10 mg, Q4H PRN  oxyCODONE, 20 mg, Q4H PRN  polyethylene glycol, 17 g, Daily PRN  sodium chloride 0.9%, 10 mL, PRN      Today I personally reviewed pertinent medications, lines/drains/airways, imaging, cardiology results, laboratory results, microbiology results, notably: H/H    Diet  Diet Adult Regular (IDDSI Level 7)  Diet Adult Regular (IDDSI Level 7)

## 2022-03-05 NOTE — PROGRESS NOTES
[facility]  Neurocritical Care  Progress Note    Admit Date: 2/14/2022  Service Date: 03/05/2022  Length of Stay: 19    Subjective:     Chief Complaint: Spondylodiscitis    History of Present Illness: Ms. Zazueta is a 42 yo F PMH cirrhosis, Hep C, pancytopenia, polysubstance abuse, epidural abscess s/p L3-L4 laminectomy and L3-L5 TLIF (4/2021; blood and surgical cultures positive for group B strep) who is admitted to the neuro ICU after hardware removal and washout. Per chart review patient had worsening back and left leg pain since October. The left leg pain is a shocking pain if she stands for 10 minutes. Also reports new weakness in the legs. Denies bowel/bladder dsyfunction or neck pain. Uses a walker to ambulate at home. Endorses cigarette and marijuana use denies Iv drug use for 2 years. Patient was on vanc and ceftriaxone prior to the surgery. She required platelets prior to the procedure, PRBCs intraoperatively and estimated blood loss 1L. On arrival to the neuro ICU she was hypotensive requiring levo and in pain.    Patient was s/d on 2/22/22 to [facility] floor and she was doing well, team managing pain and per notes neuro exam with 4/5 strength LLE and rest normal. She went to OR today for T10-pelvis fusion and L5-S1 TLIF, no complications. Had a 500cc blood loss requiring transfusion and dural tare. Patient arrived to [facility] c/o pain, but moving all 4 extremities, HD stable. Two drains in place.  Admitting for closer monitoring.       Hospital Course: 03/02/2022: CT lumbar spine suspicious for hardware infection. Patient started on vanc and ceftriaxone. Hardware removal and washout with neurosurgery 2/22. Estimated 1L blood loss intraoperatively and required 1 unit PRBCs intraoperatively. Was on levo for Pain control with IV dilaudid and PO oxycodone. Awaiting post op xray imaging.  03/03/2022: 2 u platelets, 2 u prbc. Follow up CBC stable. ID consult. Transfer to floor today.   03/04/2022 2  u platelets by NSGY. Boarding for NSGY.   03/05/2022 NAEON. Diflucan x 1.      Interval History:  See above.     Review of Systems   Constitutional:  Negative for fatigue.   Gastrointestinal:  Negative for diarrhea and vomiting.   Musculoskeletal:  Positive for back pain. Negative for gait problem.   Skin:  Negative for wound.     Objective:     Vitals:  Temp: 97.7 °F (36.5 °C)  Pulse: 94  Rhythm: normal sinus rhythm  BP: 123/73  MAP (mmHg): 91  Resp: 15  SpO2: 100 %  O2 Device (Oxygen Therapy): room air    Temp  Min: 97.7 °F (36.5 °C)  Max: 98.6 °F (37 °C)  Pulse  Min: 91  Max: 120  BP  Min: 118/56  Max: 134/63  MAP (mmHg)  Min: 80  Max: 96  Resp  Min: 10  Max: 27  SpO2  Min: 72 %  Max: 100 %    03/04 0701 - 03/05 0700  In: 1514.1 [P.O.:190; I.V.:15]  Out: 1245 [Urine:1100; Drains:145]   Unmeasured Output  Urine Occurrence: 1  Stool Occurrence: 0  Pad Count: 1       Physical Exam  Vitals and nursing note reviewed.   Constitutional:       Appearance: She is obese.   HENT:      Head: Normocephalic.      Nose: Nose normal.      Mouth/Throat:      Mouth: Mucous membranes are moist.   Eyes:      Extraocular Movements: Extraocular movements intact.      Pupils: Pupils are equal, round, and reactive to light.   Cardiovascular:      Rate and Rhythm: Normal rate.   Pulmonary:      Effort: No respiratory distress.   Abdominal:      General: There is distension.      Tenderness: There is no abdominal tenderness.   Musculoskeletal:         General: Normal range of motion.   Neurological:      Mental Status: She is alert.      GCS: GCS eye subscore is 4. GCS verbal subscore is 5. GCS motor subscore is 6.      Cranial Nerves: No cranial nerve deficit.      Sensory: No sensory deficit.      Motor: Weakness present.      Comments: Moving all 4 extremities spontaneously, 5/5 strength throughout  W/D to pain to bl lower extremities.   No saddle anesthesia noted.          Medications:  Continuoussodium chloride 0.9%, Last Rate:  Stopped (03/03/22 1708)    Scheduledacetaminophen, 1,000 mg, Q6H  cefTRIAXone (ROCEPHIN) IVPB, 2 g, Q12H  fluconazole, 150 mg, Once  LIDOcaine, 1 patch, Q24H  pantoprazole, 40 mg, Daily  pregabalin, 75 mg, TID  senna-docusate 8.6-50 mg, 1 tablet, Daily  sertraline, 50 mg, Daily  sodium chloride 0.9%, 10 mL, Q6H    PRNsodium chloride, , Q24H PRN  aluminum-magnesium hydroxide-simethicone, 30 mL, Q6H PRN  dextrose 10%, 12.5 g, PRN  dextrose 10%, 25 g, PRN  glucagon (human recombinant), 1 mg, PRN  glucose, 16 g, PRN  glucose, 24 g, PRN  HYDROmorphone, 2 mg, Q3H PRN  melatonin, 6 mg, Nightly PRN  oxyCODONE, 10 mg, Q4H PRN  oxyCODONE, 20 mg, Q4H PRN  polyethylene glycol, 17 g, Daily PRN  sodium chloride 0.9%, 10 mL, PRN      Today I personally reviewed pertinent medications, lines/drains/airways, imaging, cardiology results, laboratory results, microbiology results, notably: H/H    Diet  Diet Adult Regular (IDDSI Level 7)  Diet Adult Regular (IDDSI Level 7)          Assessment/Plan:     Psychiatric  Mild episode of recurrent major depressive disorder  Continue home sertraline 50mg daily    Derm  Vaginal itching  -Patient complained of worsening vaginal itchiness in setting of abx  -States she often experiences yeast infections when on abx  -Diflucan 150 x 1    ID  * Spondylodiscitis  Patient initially sent to the hospital from nsgy clinic for infected hardware.  Of note history of IVDU and TLIF of L3-L5 on 04/16/2021 for previous spinal abscess. This hospital admission MRI C/T/L spine showed suspected discitis/osteomyelitis at L3-4 and L4-5 with probable hardware infection initially admitted on 2/21 s/p washout and hardware removal with neurosurgery now re admitted s/p T10-pelvis fusion and L5-S1 TLIF     -- Continue ceftriaxone  -- Estimated 500L blood loss intraop, required 1L PRBCs intraoperatively  -- Monitor drain output, 2 in place open to gravity   -- ID following, will likely need 8 weeks of Abx   -- CT spine  --  SBP <180 , MAP >65   -- Pain regimen: pregabalin increased to 75mg TID, added lidocaine patch, oxycodone 10/15 PRN (first choice) and dilaudid if needed   -- ID following        Hematology  Thrombocytopenia  2/2 liver cirrhosis   -- see pancytopenia     Oncology  Pancytopenia  Hx of bone marrow biopsy 2017 that was nondiagnostic.      Recommendations:  - daily labs  - transfuse for Hgb <7  - transfuse for plt <10k or <50k with bleeding    3/4 2 u platelets per NSGY    GI  Chronic hepatitis C with cirrhosis  U/S abdomen with doppler showed cirrhosis and portal hypertension satisfactory Doppler evaluation   MELD-Na score: 8 at 3/5/2022  2:26 AM  MELD score: 8 at 3/5/2022  2:26 AM  Calculated from:  Serum Creatinine: 0.6 mg/dL (Using min of 1 mg/dL) at 3/5/2022  2:26 AM  Serum Sodium: 139 mmol/L (Using max of 137 mmol/L) at 3/5/2022  2:26 AM  Total Bilirubin: 0.8 mg/dL (Using min of 1 mg/dL) at 3/5/2022  2:26 AM  INR(ratio): 1.2 at 3/5/2022  2:26 AM  Age: 41 years    --daily CMP and INR  --will need hepatology referral at time of discharge  --Lipase normal    Other  Hyperbilirubinemia  Tbili 2.1 on admission.      Recommendations:  daily CMP, trend Tbili   Hepatology workup outpatient          The patient is being Prophylaxed for:  Venous Thromboembolism with: Mechanical  Stress Ulcer with: PPI  Ventilator Pneumonia with: none    Activity Orders          Diet Adult Regular (IDDSI Level 7): Regular starting at 03/02 1840    Turn patient every 2 hours starting at 03/02 1600    Progressive Mobility Protocol (mobilize patient to their highest level of functioning at least twice daily) starting at 02/14 2000        Full Code     Level III    SILVANA CarboneC  Neurocritical Care  [facility]

## 2022-03-05 NOTE — SUBJECTIVE & OBJECTIVE
Interval History: Interval History: 3/5: POD 3. NAEON. AFVSS. Exam stable. Pain improved. Saturating well on room air. Tolerating PO without n/v. Adequate UOP.     Medications:  Continuous Infusions:   sodium chloride 0.9% Stopped (03/03/22 1708)     Scheduled Meds:   acetaminophen  1,000 mg Oral Q6H    cefTRIAXone (ROCEPHIN) IVPB  2 g Intravenous Q12H    fluconazole  150 mg Oral Once    LIDOcaine  1 patch Transdermal Q24H    pantoprazole  40 mg Oral Daily    pregabalin  75 mg Oral TID    senna-docusate 8.6-50 mg  1 tablet Oral Daily    sertraline  50 mg Oral Daily    sodium chloride 0.9%  10 mL Intravenous Q6H     PRN Meds:sodium chloride, aluminum-magnesium hydroxide-simethicone, dextrose 10%, dextrose 10%, glucagon (human recombinant), glucose, glucose, HYDROmorphone, melatonin, oxyCODONE, oxyCODONE, polyethylene glycol, Flushing PICC Protocol **AND** sodium chloride 0.9% **AND** sodium chloride 0.9%     Objective:     Weight: 133.8 kg (294 lb 15.6 oz)  Body mass index is 46.2 kg/m².  Vital Signs (Most Recent):  Temp: 97.7 °F (36.5 °C) (03/05/22 1100)  Pulse: 94 (03/05/22 1100)  Resp: 15 (03/05/22 1100)  BP: 123/73 (03/05/22 1100)  SpO2: 100 % (03/05/22 1100) Vital Signs (24h Range):  Temp:  [97.7 °F (36.5 °C)-98.6 °F (37 °C)] 97.7 °F (36.5 °C)  Pulse:  [] 94  Resp:  [10-27] 15  SpO2:  [72 %-100 %] 100 %  BP: (118-134)/(56-77) 123/73                          Closed/Suction Drain 02/21/22 1644 Left;Medial Back Accordion 10 Fr. (Active)   Site Description Healing 02/28/22 2053   Dressing Type Other (Comment) 02/28/22 2053   Dressing Status Intact 02/27/22 2048   Dressing Intervention Integrity maintained 02/28/22 1800   Drainage Serosanguineous 02/28/22 2053   Status To bulb suction 02/28/22 2053   Output (mL) 100 mL 03/01/22 0500            Closed/Suction Drain 02/21/22 1645 Right;Medial Back Accordion 10 Fr. (Active)   Site Description Healing 02/28/22 2053   Dressing Type Other (Comment) 02/28/22 2053    Dressing Status Intact 02/28/22 1800   Dressing Intervention Integrity maintained 02/28/22 1800   Drainage Serosanguineous 02/28/22 2053   Status To bulb suction 02/28/22 2053   Output (mL) 20 mL 03/01/22 0500       Female External Urinary Catheter 02/22/22 2300 (Active)   Skin no redness;no breakdown 02/28/22 2053   Tolerance no signs/symptoms of discomfort 02/28/22 2053   Suction Continuous suction at 40 mmHg 02/28/22 2053   Date of last wick change 02/26/22 02/28/22 2053   Time of last wick change 2000 02/26/22 2000   Output (mL) 500 mL 02/27/22 0701     Neurosurgery Physical Exam  General: Well developed, well nourished, not in acute distress.   Head: Normocephalic, atraumatic.  Neck: Full ROM.   Neurologic: Alert and oriented x 4. Thought content appropriate.  GCS: Motor:6  Verbal:5  Eyes:4   GCS Total: 15  Language: No aphasia.  Speech: No dysarthria.  Cranial nerves: Face symmetric, tongue midline, CN II-XII grossly intact.   Eyes: Pupils equal, round, reactive to light with accomodation, EOMI.   Pulmonary: Normal respirations, no signs of respiratory distress.  Abdomen: Soft, non-distended, non-tender to palpation.  Sensory: Intact to light touch throughout.  Motor Strength: Moves all extremities spontaneously with good tone. No abnormal movements seen.      Strength   Deltoids Triceps Biceps Wrist Extension Wrist Flexion Hand    Upper: R 5/5 5/5 5/5 5/5 5/5 5/5     L 5/5 5/5 5/5 5/5 5/5 5/5       Hip Flexion Knee Extension Knee  Flexion Dorsiflexion Plantar flexion EHL   Lower: R 5/5 5/5 5/5 5/5 5/5 5/5     L 4/5 4/5 4/5 5/5 5/5 5/5      Parker: Absent.  Clonus: 3 beats bilaterally.  Skin: Skin is warm, dry and intact.     Incision c/d/i with staples. No surrounding erythema or edema. No drainage from incision. No palpable hematoma or underlying fluid collection.     HV drains in place.    Significant Labs:  Recent Labs   Lab 03/04/22  0009 03/05/22  0226   * 115*    139   K 4.0 3.9     105   CO2 24 26   BUN 17 15   CREATININE 0.6 0.6   CALCIUM 7.8* 7.7*   MG 1.6 1.5*     Recent Labs   Lab 03/03/22  1916 03/04/22  0009 03/05/22 0226   WBC 4.60 3.10* 2.73*   HGB 8.2* 8.1* 8.3*   HCT 25.2* 25.0* 26.5*   PLT 79* 77* 80*     Recent Labs   Lab 03/04/22  0009 03/05/22 0226   INR 1.3* 1.2     Microbiology Results (last 7 days)       Procedure Component Value Units Date/Time    Aerobic culture [[id number]] Collected: 03/02/22 1228    Order Status: Completed Specimen: Back Updated: 03/05/22 1108     Aerobic Bacterial Culture No growth    Narrative:      L5/S1 DISC    Culture, Anaerobe [[id number]] Collected: 03/02/22 1228    Order Status: Completed Specimen: Back Updated: 03/04/22 0859     Anaerobic Culture Culture in progress    Narrative:      L5/S1 DISC    AFB Culture & Smear [[id number]] Collected: 03/02/22 1228    Order Status: Completed Specimen: Back Updated: 03/03/22 2127     AFB Culture & Smear Culture in progress     AFB CULTURE STAIN No acid fast bacilli seen.    Narrative:      L5/S1 DISC    Fungus culture [[id number]] Collected: 02/21/22 1539    Order Status: Completed Specimen: Abscess from Back Updated: 03/02/22 1435     Fungus (Mycology) Culture Culture in progress    Narrative:      Epidural purulence #1    Fungus culture [[id number]] Collected: 02/21/22 1539    Order Status: Completed Specimen: Abscess from Back Updated: 03/02/22 1435     Fungus (Mycology) Culture Culture in progress    Narrative:      Epidural purulence #2    Gram stain [[id number]] Collected: 03/02/22 1228    Order Status: Completed Specimen: Back Updated: 03/02/22 1423     Gram Stain Result No WBC's      No organisms seen    Narrative:      L5/S1 DISC    Fungus culture [[id number]] Collected: 03/02/22 1228    Order Status: Sent Specimen: Back Updated: 03/02/22 1251    Fungus culture [[id number]] Collected: 02/15/22 1128    Order Status: Completed Specimen: Biopsy from Back Updated: 03/02/22 1028     Fungus (Mycology)  Culture Culture in progress      No fungus isolated after 2 weeks    Narrative:      L3-4 / L4-5 osteodiscitis          All pertinent labs from the last 24 hours have been reviewed.    Significant Diagnostics:  I have reviewed and interpreted all pertinent imaging results/findings within the past 24 hours.  No results found in the last 24 hours.

## 2022-03-06 LAB
BASOPHILS # BLD AUTO: 0.02 K/UL (ref 0–0.2)
BASOPHILS NFR BLD: 0.8 % (ref 0–1.9)
BLD PROD TYP BPU: NORMAL
BLOOD UNIT EXPIRATION DATE: NORMAL
BLOOD UNIT TYPE CODE: 6200
BLOOD UNIT TYPE: NORMAL
CODING SYSTEM: NORMAL
DIFFERENTIAL METHOD: ABNORMAL
DISPENSE STATUS: NORMAL
EOSINOPHIL # BLD AUTO: 0.1 K/UL (ref 0–0.5)
EOSINOPHIL NFR BLD: 5.5 % (ref 0–8)
ERYTHROCYTE [DISTWIDTH] IN BLOOD BY AUTOMATED COUNT: 16.8 % (ref 11.5–14.5)
HCT VFR BLD AUTO: 25.1 % (ref 37–48.5)
HGB BLD-MCNC: 7.7 G/DL (ref 12–16)
IMM GRANULOCYTES # BLD AUTO: 0.01 K/UL (ref 0–0.04)
IMM GRANULOCYTES NFR BLD AUTO: 0.4 % (ref 0–0.5)
INR PPP: 1.3 (ref 0.8–1.2)
LYMPHOCYTES # BLD AUTO: 0.5 K/UL (ref 1–4.8)
LYMPHOCYTES NFR BLD: 19.3 % (ref 18–48)
MAGNESIUM SERPL-MCNC: 1.6 MG/DL (ref 1.6–2.6)
MCH RBC QN AUTO: 29.5 PG (ref 27–31)
MCHC RBC AUTO-ENTMCNC: 30.7 G/DL (ref 32–36)
MCV RBC AUTO: 96 FL (ref 82–98)
MONOCYTES # BLD AUTO: 0.2 K/UL (ref 0.3–1)
MONOCYTES NFR BLD: 8.8 % (ref 4–15)
NEUTROPHILS # BLD AUTO: 1.6 K/UL (ref 1.8–7.7)
NEUTROPHILS NFR BLD: 65.2 % (ref 38–73)
NRBC BLD-RTO: 0 /100 WBC
PHOSPHATE SERPL-MCNC: 3 MG/DL (ref 2.7–4.5)
PLATELET # BLD AUTO: 68 K/UL (ref 150–450)
PMV BLD AUTO: 10.1 FL (ref 9.2–12.9)
PROTHROMBIN TIME: 13.4 SEC (ref 9–12.5)
RBC # BLD AUTO: 2.61 M/UL (ref 4–5.4)
TRANS ERYTHROCYTES VOL PATIENT: NORMAL ML
UNIT NUMBER: NORMAL
WBC # BLD AUTO: 2.38 K/UL (ref 3.9–12.7)

## 2022-03-06 PROCEDURE — 83735 ASSAY OF MAGNESIUM: CPT | Performed by: PSYCHIATRY & NEUROLOGY

## 2022-03-06 PROCEDURE — 25000003 PHARM REV CODE 250: Performed by: STUDENT IN AN ORGANIZED HEALTH CARE EDUCATION/TRAINING PROGRAM

## 2022-03-06 PROCEDURE — 85025 COMPLETE CBC W/AUTO DIFF WBC: CPT | Performed by: STUDENT IN AN ORGANIZED HEALTH CARE EDUCATION/TRAINING PROGRAM

## 2022-03-06 PROCEDURE — 63600175 PHARM REV CODE 636 W HCPCS: Performed by: NURSE PRACTITIONER

## 2022-03-06 PROCEDURE — 25000003 PHARM REV CODE 250

## 2022-03-06 PROCEDURE — 63600175 PHARM REV CODE 636 W HCPCS: Performed by: PHYSICIAN ASSISTANT

## 2022-03-06 PROCEDURE — 25000003 PHARM REV CODE 250: Performed by: NEUROLOGICAL SURGERY

## 2022-03-06 PROCEDURE — 11000001 HC ACUTE MED/SURG PRIVATE ROOM

## 2022-03-06 PROCEDURE — 84100 ASSAY OF PHOSPHORUS: CPT | Performed by: PSYCHIATRY & NEUROLOGY

## 2022-03-06 PROCEDURE — 25000003 PHARM REV CODE 250: Performed by: PHYSICIAN ASSISTANT

## 2022-03-06 PROCEDURE — P9035 PLATELET PHERES LEUKOREDUCED: HCPCS | Performed by: STUDENT IN AN ORGANIZED HEALTH CARE EDUCATION/TRAINING PROGRAM

## 2022-03-06 PROCEDURE — 36430 TRANSFUSION BLD/BLD COMPNT: CPT

## 2022-03-06 PROCEDURE — A4216 STERILE WATER/SALINE, 10 ML: HCPCS | Performed by: NEUROLOGICAL SURGERY

## 2022-03-06 PROCEDURE — 85610 PROTHROMBIN TIME: CPT

## 2022-03-06 PROCEDURE — 94761 N-INVAS EAR/PLS OXIMETRY MLT: CPT

## 2022-03-06 RX ORDER — HYDROCODONE BITARTRATE AND ACETAMINOPHEN 500; 5 MG/1; MG/1
TABLET ORAL
Status: DISCONTINUED | OUTPATIENT
Start: 2022-03-06 | End: 2022-03-08

## 2022-03-06 RX ADMIN — Medication 10 ML: at 05:03

## 2022-03-06 RX ADMIN — PANTOPRAZOLE SODIUM 40 MG: 20 TABLET, DELAYED RELEASE ORAL at 09:03

## 2022-03-06 RX ADMIN — OXYCODONE HYDROCHLORIDE 10 MG: 10 TABLET ORAL at 09:03

## 2022-03-06 RX ADMIN — Medication 6 MG: at 09:03

## 2022-03-06 RX ADMIN — HYDROMORPHONE HYDROCHLORIDE 2 MG: 1 INJECTION, SOLUTION INTRAMUSCULAR; INTRAVENOUS; SUBCUTANEOUS at 04:03

## 2022-03-06 RX ADMIN — Medication 10 ML: at 04:03

## 2022-03-06 RX ADMIN — OXYCODONE HYDROCHLORIDE 20 MG: 10 TABLET ORAL at 09:03

## 2022-03-06 RX ADMIN — SERTRALINE HYDROCHLORIDE 50 MG: 50 TABLET ORAL at 09:03

## 2022-03-06 RX ADMIN — SENNOSIDES AND DOCUSATE SODIUM 1 TABLET: 50; 8.6 TABLET ORAL at 09:03

## 2022-03-06 RX ADMIN — CEFTRIAXONE 2 G: 2 INJECTION, POWDER, FOR SOLUTION INTRAMUSCULAR; INTRAVENOUS at 04:03

## 2022-03-06 RX ADMIN — Medication 10 ML: at 12:03

## 2022-03-06 RX ADMIN — OXYCODONE HYDROCHLORIDE 20 MG: 10 TABLET ORAL at 06:03

## 2022-03-06 RX ADMIN — PREGABALIN 75 MG: 75 CAPSULE ORAL at 09:03

## 2022-03-06 RX ADMIN — ACETAMINOPHEN 1000 MG: 325 TABLET ORAL at 12:03

## 2022-03-06 RX ADMIN — CEFTRIAXONE 2 G: 2 INJECTION, POWDER, FOR SOLUTION INTRAMUSCULAR; INTRAVENOUS at 03:03

## 2022-03-06 RX ADMIN — OXYCODONE HYDROCHLORIDE 20 MG: 10 TABLET ORAL at 01:03

## 2022-03-06 RX ADMIN — OXYCODONE HYDROCHLORIDE 10 MG: 10 TABLET ORAL at 02:03

## 2022-03-06 RX ADMIN — LIDOCAINE 1 PATCH: 50 PATCH CUTANEOUS at 03:03

## 2022-03-06 RX ADMIN — ACETAMINOPHEN 1000 MG: 325 TABLET ORAL at 05:03

## 2022-03-06 RX ADMIN — PREGABALIN 75 MG: 75 CAPSULE ORAL at 08:03

## 2022-03-06 RX ADMIN — PREGABALIN 75 MG: 75 CAPSULE ORAL at 03:03

## 2022-03-06 NOTE — ASSESSMENT & PLAN NOTE
Rachel Zazueta is a 41 F with PMH hepatitis C, cirrhosis, pancytopenia, nicotine abuse, hx IVDU (last use 2 years ago), strep agalactae bacteremia, s/p L3-5 TLIF on 4/16/2021 who presents with persistent and progressive lumbar spondylodiscitis with resultant hardware failure and new scoliosis.     S/p hardware removal on 2/21. Now s/p T10 t pelvis posterior instrumented fusion with L5-S1 TLIF on 3/2.     Plan:  --Patient PCU status.       -q2h neurochecks on PCU  --Post op XR Pending  --All labs and diagnostics personally reviewed  --Follow-up MRI Brain, C/T/L w/wo contrast:   -No evidence of osteomyelitis, discitis, or epidural abscess within the cervical or thoracic spine   -No diffusion positive or abnormal enhancement within the brain   -MRI brain shows patchy nonspecific increased T2 and FLAIR signal in the periventricular and subcortical white matter bilaterally. Could be due to chronic microvascular ischemic changes vs demyelinating disease given patient's age. No evidence of active demyelination.   -DDD C4-7 with mild to moderate canal stenosis  --Full infectious w/u.   -Inflammatory markers wnl. BCx 2/14 NGTD, UA/Ucx pending. Wound Cx pending.   -F/u ID recs - now on Rocephin. PICC placed.   --S/p L3-4 disc biopsy and L3 bone biopsy with IR 2/15 - Gram stain rare WBC. Cx NGTD.  --Utox + for amphetamines, addiction psych consulted per HM.   --Hold anti-plt/coag medications.  --Monitor for urinary retention - voiding spontaneously. Bladder scan prn.  --Pain control: Oxy 10 q4h PRN for moderate pain, Oxy 15 q4h PRN for severe pain, Dilaudid 1 mg q4h PRN when pain not relieved by PO meds. Continue lyrica 50 mg TID.  --Drains: to gravity. Keep until POD 5, continue IV abx while in place.   --Plt stable; goal > 80k  --DVT ppx: TEDs/SCDs  --Bowel regimen: Miralax, senna, lactulose 2/23. + BM.  --Activity: PT/OT OOB, TLSO brace when OOB.  --Continue to monitor clinically, notify NSGY immediately with any  changes in neuro status.    Discussed with Dr. Templeton.    Dispo: Anticipate rehab.

## 2022-03-06 NOTE — NURSING
There is a leak on patient wound vac. I tried to reinforce the leak and it did not work. Called neuro surgery  And informed him of the leak. He said they would come later to fix. MD said to turn off the wound vac in the meantime. Will continue to monitor patient's dressing.

## 2022-03-06 NOTE — PROGRESS NOTES
[facility] - Neurosurgery ([facility])  Neurosurgery  Progress Note    Subjective:     History of Present Illness: Rachel Zazueta is a 41 y.o.  female with PMHx of Hepatitis C, liver cirrhosis, pancytopenia, and polysubstance abuse, who presents to clinic for follow up with new imaging s/p L3 and L4 laminectomy for evacuation of epidural abscess and L3-L5 TLIF on 04/16/2021.     The pt c/o worsening back and left leg pain since October. She states that she is no longer using IV drugs. States only using marijuana. Describes the left leg pain as a shock down the leg when standing for 10 minutes. Denies taking any antibiotics. Endorses new weakness in the legs. Denies b/b dysfunction. Denies new neck pain. She ambulates with a walker at home. States that she smoke.    She presents to ED as direct admit from clinic.       Post-Op Info:  Procedure(s) (LRB):  FUSION, SPINE, LUMBAR (Bilateral)   4 Days Post-Op     Interval History: Interval History: 3/6: POD 4. NAEON. AFVSS. Exam stable. Pain controlled. Tolerating OOB. Tolerating PO, voiding and having BM    Medications:  Continuous Infusions:   sodium chloride 0.9% Stopped (03/03/22 1708)     Scheduled Meds:   acetaminophen  1,000 mg Oral Q6H    cefTRIAXone (ROCEPHIN) IVPB  2 g Intravenous Q12H    LIDOcaine  1 patch Transdermal Q24H    pantoprazole  40 mg Oral Daily    pregabalin  75 mg Oral TID    senna-docusate 8.6-50 mg  1 tablet Oral Daily    sertraline  50 mg Oral Daily    sodium chloride 0.9%  10 mL Intravenous Q6H     PRN Meds:sodium chloride, aluminum-magnesium hydroxide-simethicone, dextrose 10%, dextrose 10%, glucagon (human recombinant), glucose, glucose, HYDROmorphone, melatonin, oxyCODONE, oxyCODONE, polyethylene glycol, Flushing PICC Protocol **AND** sodium chloride 0.9% **AND** sodium chloride 0.9%     Objective:     Weight: 133.8 kg (294 lb 15.6 oz)  Body mass index is 46.2 kg/m².  Vital Signs (Most Recent):  Temp: 98 °F (36.7 °C) (03/06/22  1523)  Pulse: 90 (03/06/22 1523)  Resp: 20 (03/06/22 1523)  BP: (!) 105/40 (03/06/22 1523)  SpO2: 99 % (03/06/22 1523) Vital Signs (24h Range):  Temp:  [97.2 °F (36.2 °C)-98.1 °F (36.7 °C)] 98 °F (36.7 °C)  Pulse:  [89-99] 90  Resp:  [17-20] 20  SpO2:  [98 %-100 %] 99 %  BP: (105-128)/(40-71) 105/40                          Closed/Suction Drain 02/21/22 1644 Left;Medial Back Accordion 10 Fr. (Active)   Site Description Healing 02/28/22 2053   Dressing Type Other (Comment) 02/28/22 2053   Dressing Status Intact 02/27/22 2048   Dressing Intervention Integrity maintained 02/28/22 1800   Drainage Serosanguineous 02/28/22 2053   Status To bulb suction 02/28/22 2053   Output (mL) 100 mL 03/01/22 0500            Closed/Suction Drain 02/21/22 1645 Right;Medial Back Accordion 10 Fr. (Active)   Site Description Healing 02/28/22 2053   Dressing Type Other (Comment) 02/28/22 2053   Dressing Status Intact 02/28/22 1800   Dressing Intervention Integrity maintained 02/28/22 1800   Drainage Serosanguineous 02/28/22 2053   Status To bulb suction 02/28/22 2053   Output (mL) 20 mL 03/01/22 0500       Female External Urinary Catheter 02/22/22 2300 (Active)   Skin no redness;no breakdown 02/28/22 2053   Tolerance no signs/symptoms of discomfort 02/28/22 2053   Suction Continuous suction at 40 mmHg 02/28/22 2053   Date of last wick change 02/26/22 02/28/22 2053   Time of last wick change 2000 02/26/22 2000   Output (mL) 500 mL 02/27/22 0701     Neurosurgery Physical Exam  General: Well developed, well nourished, not in acute distress.   Head: Normocephalic, atraumatic.  Neck: Full ROM.   Neurologic: Alert and oriented x 4. Thought content appropriate.  GCS: Motor:6  Verbal:5  Eyes:4   GCS Total: 15  Language: No aphasia.  Speech: No dysarthria.  Cranial nerves: Face symmetric, tongue midline, CN II-XII grossly intact.   Eyes: Pupils equal, round, reactive to light with accomodation, EOMI.   Pulmonary: Normal respirations, no signs of  respiratory distress.  Abdomen: Soft, non-distended, non-tender to palpation.  Sensory: Intact to light touch throughout.  Motor Strength: Moves all extremities spontaneously with good tone. No abnormal movements seen.      Strength   Deltoids Triceps Biceps Wrist Extension Wrist Flexion Hand    Upper: R 5/5 5/5 5/5 5/5 5/5 5/5     L 5/5 5/5 5/5 5/5 5/5 5/5       Hip Flexion Knee Extension Knee  Flexion Dorsiflexion Plantar flexion EHL   Lower: R 5/5 5/5 5/5 5/5 5/5 5/5     L 4/5 4/5 4/5 5/5 5/5 5/5      Parker: Absent.  Clonus: 3 beats bilaterally.  Skin: Skin is warm, dry and intact.     Incision c/d/i with staples. No surrounding erythema or edema. No drainage from incision. No palpable hematoma or underlying fluid collection.     HV drains in place.    Significant Labs:  Recent Labs   Lab 03/05/22 0226 03/06/22 0425   *  --      --    K 3.9  --      --    CO2 26  --    BUN 15  --    CREATININE 0.6  --    CALCIUM 7.7*  --    MG 1.5* 1.6     Recent Labs   Lab 03/05/22 0226 03/06/22 0425   WBC 2.73* 2.38*   HGB 8.3* 7.7*   HCT 26.5* 25.1*   PLT 80* 68*     Recent Labs   Lab 03/05/22 0226 03/06/22 0425   INR 1.2 1.3*     Microbiology Results (last 7 days)       Procedure Component Value Units Date/Time    Aerobic culture [[id number]] Collected: 03/02/22 1228    Order Status: Completed Specimen: Back Updated: 03/05/22 1108     Aerobic Bacterial Culture No growth    Narrative:      L5/S1 DISC    Culture, Anaerobe [[id number]] Collected: 03/02/22 1228    Order Status: Completed Specimen: Back Updated: 03/04/22 0859     Anaerobic Culture Culture in progress    Narrative:      L5/S1 DISC    AFB Culture & Smear [[id number]] Collected: 03/02/22 1228    Order Status: Completed Specimen: Back Updated: 03/03/22 2127     AFB Culture & Smear Culture in progress     AFB CULTURE STAIN No acid fast bacilli seen.    Narrative:      L5/S1 DISC    Fungus culture [[id number]] Collected: 02/21/22 1539    Order  Status: Completed Specimen: Abscess from Back Updated: 03/02/22 1435     Fungus (Mycology) Culture Culture in progress    Narrative:      Epidural purulence #1    Fungus culture [[id number]] Collected: 02/21/22 1539    Order Status: Completed Specimen: Abscess from Back Updated: 03/02/22 1435     Fungus (Mycology) Culture Culture in progress    Narrative:      Epidural purulence #2    Gram stain [[id number]] Collected: 03/02/22 1228    Order Status: Completed Specimen: Back Updated: 03/02/22 1423     Gram Stain Result No WBC's      No organisms seen    Narrative:      L5/S1 DISC    Fungus culture [[id number]] Collected: 03/02/22 1228    Order Status: Sent Specimen: Back Updated: 03/02/22 1251    Fungus culture [[id number]] Collected: 02/15/22 1128    Order Status: Completed Specimen: Biopsy from Back Updated: 03/02/22 1028     Fungus (Mycology) Culture Culture in progress      No fungus isolated after 2 weeks    Narrative:      L3-4 / L4-5 osteodiscitis          All pertinent labs from the last 24 hours have been reviewed.    Significant Diagnostics:  I have reviewed and interpreted all pertinent imaging results/findings within the past 24 hours.  No results found in the last 24 hours.        Assessment/Plan:     * Spondylodiscitis  Rachel Zazueta is a 41 F with PMH hepatitis C, cirrhosis, pancytopenia, nicotine abuse, hx IVDU (last use 2 years ago), strep agalactae bacteremia, s/p L3-5 TLIF on 4/16/2021 who presents with persistent and progressive lumbar spondylodiscitis with resultant hardware failure and new scoliosis.     S/p hardware removal on 2/21. Now s/p T10 t pelvis posterior instrumented fusion with L5-S1 TLIF on 3/2.     Plan:  --Patient PCU status.       -q2h neurochecks on PCU  --Post op XR Pending  --All labs and diagnostics personally reviewed  --Follow-up MRI Brain, C/T/L w/wo contrast:   -No evidence of osteomyelitis, discitis, or epidural abscess within the cervical or thoracic spine   -No  diffusion positive or abnormal enhancement within the brain   -MRI brain shows patchy nonspecific increased T2 and FLAIR signal in the periventricular and subcortical white matter bilaterally. Could be due to chronic microvascular ischemic changes vs demyelinating disease given patient's age. No evidence of active demyelination.   -DDD C4-7 with mild to moderate canal stenosis  --Full infectious w/u.   -Inflammatory markers wnl. BCx 2/14 NGTD, UA/Ucx pending. Wound Cx pending.   -F/u ID recs - now on Rocephin. PICC placed.   --S/p L3-4 disc biopsy and L3 bone biopsy with IR 2/15 - Gram stain rare WBC. Cx NGTD.  --Utox + for amphetamines, addiction psych consulted per .   --Hold anti-plt/coag medications.  --Monitor for urinary retention - voiding spontaneously. Bladder scan prn.  --Pain control: Oxy 10 q4h PRN for moderate pain, Oxy 15 q4h PRN for severe pain, Dilaudid 1 mg q4h PRN when pain not relieved by PO meds. Continue lyrica 50 mg TID.  --Drains: to gravity. Keep until POD 5, continue IV abx while in place.   --Plt stable; goal > 80k  --DVT ppx: TEDs/SCDs  --Bowel regimen: Miralax, senna, lactulose 2/23. + BM.  --Activity: PT/OT OOB, TLSO brace when OOB.  --Continue to monitor clinically, notify NSGY immediately with any changes in neuro status.    Discussed with Dr. Templeton.    Dispo: Anticipate rehab.        Lee Patel MD  Neurosurgery  [facility] - Neurosurgery ([facility])

## 2022-03-06 NOTE — SUBJECTIVE & OBJECTIVE
Interval History: Interval History: 3/6: POD 4. NAEON. AFVSS. Exam stable. Pain controlled. Tolerating OOB. Tolerating PO, voiding and having BM    Medications:  Continuous Infusions:   sodium chloride 0.9% Stopped (03/03/22 1708)     Scheduled Meds:   acetaminophen  1,000 mg Oral Q6H    cefTRIAXone (ROCEPHIN) IVPB  2 g Intravenous Q12H    LIDOcaine  1 patch Transdermal Q24H    pantoprazole  40 mg Oral Daily    pregabalin  75 mg Oral TID    senna-docusate 8.6-50 mg  1 tablet Oral Daily    sertraline  50 mg Oral Daily    sodium chloride 0.9%  10 mL Intravenous Q6H     PRN Meds:sodium chloride, aluminum-magnesium hydroxide-simethicone, dextrose 10%, dextrose 10%, glucagon (human recombinant), glucose, glucose, HYDROmorphone, melatonin, oxyCODONE, oxyCODONE, polyethylene glycol, Flushing PICC Protocol **AND** sodium chloride 0.9% **AND** sodium chloride 0.9%     Objective:     Weight: 133.8 kg (294 lb 15.6 oz)  Body mass index is 46.2 kg/m².  Vital Signs (Most Recent):  Temp: 98 °F (36.7 °C) (03/06/22 1523)  Pulse: 90 (03/06/22 1523)  Resp: 20 (03/06/22 1523)  BP: (!) 105/40 (03/06/22 1523)  SpO2: 99 % (03/06/22 1523) Vital Signs (24h Range):  Temp:  [97.2 °F (36.2 °C)-98.1 °F (36.7 °C)] 98 °F (36.7 °C)  Pulse:  [89-99] 90  Resp:  [17-20] 20  SpO2:  [98 %-100 %] 99 %  BP: (105-128)/(40-71) 105/40                          Closed/Suction Drain 02/21/22 1644 Left;Medial Back Accordion 10 Fr. (Active)   Site Description Healing 02/28/22 2053   Dressing Type Other (Comment) 02/28/22 2053   Dressing Status Intact 02/27/22 2048   Dressing Intervention Integrity maintained 02/28/22 1800   Drainage Serosanguineous 02/28/22 2053   Status To bulb suction 02/28/22 2053   Output (mL) 100 mL 03/01/22 0500            Closed/Suction Drain 02/21/22 1645 Right;Medial Back Accordion 10 Fr. (Active)   Site Description Healing 02/28/22 2053   Dressing Type Other (Comment) 02/28/22 2053   Dressing Status Intact 02/28/22 1800   Dressing  Intervention Integrity maintained 02/28/22 1800   Drainage Serosanguineous 02/28/22 2053   Status To bulb suction 02/28/22 2053   Output (mL) 20 mL 03/01/22 0500       Female External Urinary Catheter 02/22/22 2300 (Active)   Skin no redness;no breakdown 02/28/22 2053   Tolerance no signs/symptoms of discomfort 02/28/22 2053   Suction Continuous suction at 40 mmHg 02/28/22 2053   Date of last wick change 02/26/22 02/28/22 2053   Time of last wick change 2000 02/26/22 2000   Output (mL) 500 mL 02/27/22 0701     Neurosurgery Physical Exam  General: Well developed, well nourished, not in acute distress.   Head: Normocephalic, atraumatic.  Neck: Full ROM.   Neurologic: Alert and oriented x 4. Thought content appropriate.  GCS: Motor:6  Verbal:5  Eyes:4   GCS Total: 15  Language: No aphasia.  Speech: No dysarthria.  Cranial nerves: Face symmetric, tongue midline, CN II-XII grossly intact.   Eyes: Pupils equal, round, reactive to light with accomodation, EOMI.   Pulmonary: Normal respirations, no signs of respiratory distress.  Abdomen: Soft, non-distended, non-tender to palpation.  Sensory: Intact to light touch throughout.  Motor Strength: Moves all extremities spontaneously with good tone. No abnormal movements seen.      Strength   Deltoids Triceps Biceps Wrist Extension Wrist Flexion Hand    Upper: R 5/5 5/5 5/5 5/5 5/5 5/5     L 5/5 5/5 5/5 5/5 5/5 5/5       Hip Flexion Knee Extension Knee  Flexion Dorsiflexion Plantar flexion EHL   Lower: R 5/5 5/5 5/5 5/5 5/5 5/5     L 4/5 4/5 4/5 5/5 5/5 5/5      Parker: Absent.  Clonus: 3 beats bilaterally.  Skin: Skin is warm, dry and intact.     Incision c/d/i with staples. No surrounding erythema or edema. No drainage from incision. No palpable hematoma or underlying fluid collection.     HV drains in place.    Significant Labs:  Recent Labs   Lab 03/05/22 0226 03/06/22  0425   *  --      --    K 3.9  --      --    CO2 26  --    BUN 15  --     CREATININE 0.6  --    CALCIUM 7.7*  --    MG 1.5* 1.6     Recent Labs   Lab 03/05/22 0226 03/06/22  0425   WBC 2.73* 2.38*   HGB 8.3* 7.7*   HCT 26.5* 25.1*   PLT 80* 68*     Recent Labs   Lab 03/05/22 0226 03/06/22  0425   INR 1.2 1.3*     Microbiology Results (last 7 days)       Procedure Component Value Units Date/Time    Aerobic culture [[id number]] Collected: 03/02/22 1228    Order Status: Completed Specimen: Back Updated: 03/05/22 1108     Aerobic Bacterial Culture No growth    Narrative:      L5/S1 DISC    Culture, Anaerobe [[id number]] Collected: 03/02/22 1228    Order Status: Completed Specimen: Back Updated: 03/04/22 0859     Anaerobic Culture Culture in progress    Narrative:      L5/S1 DISC    AFB Culture & Smear [[id number]] Collected: 03/02/22 1228    Order Status: Completed Specimen: Back Updated: 03/03/22 2127     AFB Culture & Smear Culture in progress     AFB CULTURE STAIN No acid fast bacilli seen.    Narrative:      L5/S1 DISC    Fungus culture [[id number]] Collected: 02/21/22 1539    Order Status: Completed Specimen: Abscess from Back Updated: 03/02/22 1435     Fungus (Mycology) Culture Culture in progress    Narrative:      Epidural purulence #1    Fungus culture [[id number]] Collected: 02/21/22 1539    Order Status: Completed Specimen: Abscess from Back Updated: 03/02/22 1435     Fungus (Mycology) Culture Culture in progress    Narrative:      Epidural purulence #2    Gram stain [[id number]] Collected: 03/02/22 1228    Order Status: Completed Specimen: Back Updated: 03/02/22 1423     Gram Stain Result No WBC's      No organisms seen    Narrative:      L5/S1 DISC    Fungus culture [[id number]] Collected: 03/02/22 1228    Order Status: Sent Specimen: Back Updated: 03/02/22 1251    Fungus culture [[id number]] Collected: 02/15/22 1128    Order Status: Completed Specimen: Biopsy from Back Updated: 03/02/22 1028     Fungus (Mycology) Culture Culture in progress      No fungus isolated after 2 weeks     Narrative:      L3-4 / L4-5 osteodiscitis          All pertinent labs from the last 24 hours have been reviewed.    Significant Diagnostics:  I have reviewed and interpreted all pertinent imaging results/findings within the past 24 hours.  No results found in the last 24 hours.

## 2022-03-06 NOTE — PLAN OF CARE
"Ms. Zazueta is 40 y/o F with PMH of cirrhosis, Hep C, pancytopenia, polysubstance abuse, and epidural abscess s/p L3-L4 laminectomy and L3-L5 TLIF (4/2021; blood and surgical cultures positive for group B strep) [place] [facility] [place]gressive [place] spondylodiscitis and hardware failure. IM6 consulted for preop risk stratification and medical optimization for liver cirrhosis.         MELD score on 3/6/22 pending on CMP results    MELD-Na score: 8 at 3/5/2022  2:26 AM  MELD score: 8 at 3/5/2022  2:26 AM  Calculated from:  Serum Creatinine: 0.6 mg/dL (Using min of 1 mg/dL) at 3/5/2022  2:26 AM  Serum Sodium: 139 mmol/L (Using max of 137 mmol/L) at 3/5/2022  2:26 AM  Total Bilirubin: 0.8 mg/dL (Using min of 1 mg/dL) at 3/5/2022  2:26 AM  INR(ratio): 1.2 at 3/5/2022  2:26 AM  Age: 41 years        - continue rocephin per ID recs, PICC placed   - daily CMP, INR   - will need outpatient hepatology referral    - recommend placement on discharge for continued IV abx given hx of drug use and noncompliance with previous IV abx      Please secure chat [facility]  ("Medicine Consult Only") or contact me directly for any additional questions or concerns    Megan Artis MD PhD  PGY-1  Internal Medicine  "

## 2022-03-06 NOTE — PLAN OF CARE
Problem: Impaired Wound Healing  Goal: Optimal Wound Healing  Outcome: Ongoing, Progressing  Intervention: Promote Wound Healing  Flowsheets (Taken 3/6/2022 1726)  Oral Nutrition Promotion: safe use of adaptive equipment encouraged  Activity Management:   Up to bedside commode - L3   Ambulated in room - L4  Pain Management Interventions:   care clustered   diversional activity provided   pain management plan reviewed with patient/caregiver

## 2022-03-07 LAB
BASOPHILS # BLD AUTO: 0.03 K/UL (ref 0–0.2)
BASOPHILS NFR BLD: 0.9 % (ref 0–1.9)
DIFFERENTIAL METHOD: ABNORMAL
EOSINOPHIL # BLD AUTO: 0.2 K/UL (ref 0–0.5)
EOSINOPHIL NFR BLD: 6.3 % (ref 0–8)
ERYTHROCYTE [DISTWIDTH] IN BLOOD BY AUTOMATED COUNT: 17.1 % (ref 11.5–14.5)
HCT VFR BLD AUTO: 28.3 % (ref 37–48.5)
HGB BLD-MCNC: 8.9 G/DL (ref 12–16)
IMM GRANULOCYTES # BLD AUTO: 0.02 K/UL (ref 0–0.04)
IMM GRANULOCYTES NFR BLD AUTO: 0.6 % (ref 0–0.5)
INR PPP: 1.4 (ref 0.8–1.2)
LYMPHOCYTES # BLD AUTO: 0.6 K/UL (ref 1–4.8)
LYMPHOCYTES NFR BLD: 16.6 % (ref 18–48)
MAGNESIUM SERPL-MCNC: 1.6 MG/DL (ref 1.6–2.6)
MCH RBC QN AUTO: 29.5 PG (ref 27–31)
MCHC RBC AUTO-ENTMCNC: 31.4 G/DL (ref 32–36)
MCV RBC AUTO: 94 FL (ref 82–98)
MONOCYTES # BLD AUTO: 0.3 K/UL (ref 0.3–1)
MONOCYTES NFR BLD: 8.5 % (ref 4–15)
NEUTROPHILS # BLD AUTO: 2.2 K/UL (ref 1.8–7.7)
NEUTROPHILS NFR BLD: 67.1 % (ref 38–73)
NRBC BLD-RTO: 0 /100 WBC
PHOSPHATE SERPL-MCNC: 2.8 MG/DL (ref 2.7–4.5)
PLATELET # BLD AUTO: 104 K/UL (ref 150–450)
PMV BLD AUTO: 10.2 FL (ref 9.2–12.9)
PROTHROMBIN TIME: 14.6 SEC (ref 9–12.5)
RBC # BLD AUTO: 3.02 M/UL (ref 4–5.4)
WBC # BLD AUTO: 3.31 K/UL (ref 3.9–12.7)

## 2022-03-07 PROCEDURE — 11000001 HC ACUTE MED/SURG PRIVATE ROOM

## 2022-03-07 PROCEDURE — 25000003 PHARM REV CODE 250

## 2022-03-07 PROCEDURE — 85610 PROTHROMBIN TIME: CPT

## 2022-03-07 PROCEDURE — 84100 ASSAY OF PHOSPHORUS: CPT | Performed by: PSYCHIATRY & NEUROLOGY

## 2022-03-07 PROCEDURE — 63600175 PHARM REV CODE 636 W HCPCS: Performed by: PHYSICIAN ASSISTANT

## 2022-03-07 PROCEDURE — 97530 THERAPEUTIC ACTIVITIES: CPT

## 2022-03-07 PROCEDURE — C1751 CATH, INF, PER/CENT/MIDLINE: HCPCS

## 2022-03-07 PROCEDURE — C1729 CATH, DRAINAGE: HCPCS

## 2022-03-07 PROCEDURE — 99024 PR POST-OP FOLLOW-UP VISIT: ICD-10-PCS | Mod: ,,,

## 2022-03-07 PROCEDURE — A4216 STERILE WATER/SALINE, 10 ML: HCPCS | Performed by: NEUROLOGICAL SURGERY

## 2022-03-07 PROCEDURE — 99024 POSTOP FOLLOW-UP VISIT: CPT | Mod: ,,,

## 2022-03-07 PROCEDURE — 25000003 PHARM REV CODE 250: Performed by: STUDENT IN AN ORGANIZED HEALTH CARE EDUCATION/TRAINING PROGRAM

## 2022-03-07 PROCEDURE — 85025 COMPLETE CBC W/AUTO DIFF WBC: CPT | Performed by: STUDENT IN AN ORGANIZED HEALTH CARE EDUCATION/TRAINING PROGRAM

## 2022-03-07 PROCEDURE — 25000003 PHARM REV CODE 250: Performed by: PHYSICIAN ASSISTANT

## 2022-03-07 PROCEDURE — 83735 ASSAY OF MAGNESIUM: CPT | Performed by: PSYCHIATRY & NEUROLOGY

## 2022-03-07 PROCEDURE — 25000003 PHARM REV CODE 250: Performed by: NEUROLOGICAL SURGERY

## 2022-03-07 RX ORDER — MICONAZOLE NITRATE 2 %
POWDER (GRAM) TOPICAL 2 TIMES DAILY
Status: DISCONTINUED | OUTPATIENT
Start: 2022-03-07 | End: 2022-03-15 | Stop reason: HOSPADM

## 2022-03-07 RX ADMIN — LIDOCAINE 1 PATCH: 50 PATCH CUTANEOUS at 06:03

## 2022-03-07 RX ADMIN — SERTRALINE HYDROCHLORIDE 50 MG: 50 TABLET ORAL at 10:03

## 2022-03-07 RX ADMIN — PREGABALIN 75 MG: 75 CAPSULE ORAL at 10:03

## 2022-03-07 RX ADMIN — Medication 10 ML: at 06:03

## 2022-03-07 RX ADMIN — PANTOPRAZOLE SODIUM 40 MG: 20 TABLET, DELAYED RELEASE ORAL at 10:03

## 2022-03-07 RX ADMIN — ACETAMINOPHEN 1000 MG: 325 TABLET ORAL at 03:03

## 2022-03-07 RX ADMIN — OXYCODONE HYDROCHLORIDE 20 MG: 10 TABLET ORAL at 10:03

## 2022-03-07 RX ADMIN — OXYCODONE HYDROCHLORIDE 20 MG: 10 TABLET ORAL at 08:03

## 2022-03-07 RX ADMIN — OXYCODONE HYDROCHLORIDE 20 MG: 10 TABLET ORAL at 03:03

## 2022-03-07 RX ADMIN — PREGABALIN 75 MG: 75 CAPSULE ORAL at 03:03

## 2022-03-07 RX ADMIN — CEFTRIAXONE 2 G: 2 INJECTION, POWDER, FOR SOLUTION INTRAMUSCULAR; INTRAVENOUS at 03:03

## 2022-03-07 RX ADMIN — ACETAMINOPHEN 1000 MG: 325 TABLET ORAL at 12:03

## 2022-03-07 RX ADMIN — Medication 10 ML: at 12:03

## 2022-03-07 RX ADMIN — PREGABALIN 75 MG: 75 CAPSULE ORAL at 08:03

## 2022-03-07 RX ADMIN — MICONAZOLE NITRATE 2 % TOPICAL POWDER: at 08:03

## 2022-03-07 RX ADMIN — ACETAMINOPHEN 1000 MG: 325 TABLET ORAL at 06:03

## 2022-03-07 RX ADMIN — SENNOSIDES AND DOCUSATE SODIUM 1 TABLET: 50; 8.6 TABLET ORAL at 10:03

## 2022-03-07 NOTE — PLAN OF CARE
"Ms. Zazueta is 40 y/o F with PMH of cirrhosis, Hep C, pancytopenia, polysubstance abuse, and epidural abscess s/p L3-L4 laminectomy and L3-L5 TLIF (4/2021; blood and surgical cultures positive for group B strep) admitted from [facility] [place] [facility] with progressive lumbar spondylodiscitis and hardware failure. IM6 consulted for preop risk stratification and medical optimization for liver cirrhosis.       MELD-Na score: 10 at 3/7/2022  3:51 AM  MELD score: 10 at 3/7/2022  3:51 AM  Calculated from:  Serum Creatinine: 0.6 mg/dL (Using min of 1 mg/dL) at 3/5/2022  2:26 AM  Serum Sodium: 139 mmol/L (Using max of 137 mmol/L) at 3/5/2022  2:26 AM  Total Bilirubin: 0.8 mg/dL (Using min of 1 mg/dL) at 3/5/2022  2:26 AM  INR(ratio): 1.4 at 3/7/2022  3:51 AM  Age: 41 years        - continue rocephin per ID recs, PICC placed   - daily CMP, INR   - will need outpatient hepatology referral    - recommend placement on discharge for continued IV abx given hx of drug use and noncompliance with previous IV abx      Please secure chat [facility]  ("Medicine Consult Only") or contact me directly for any additional questions or concerns        Simi Argueta MD  Internal Medicine, PGYIII  "

## 2022-03-07 NOTE — PT/OT/SLP PROGRESS
"Occupational Therapy   Treatment    Name: Rachel Zazueta  MRN: [id number]  Admitting Diagnosis:  Spondylodiscitis  5 Days Post-Op    Recommendations:     Discharge Recommendations: rehabilitation facility  Discharge Equipment Recommendations:  other (see comments) (TBD)  Barriers to discharge:  Inaccessible home environment (fall risk)    Assessment:     Rachel Zazueta is a 41 y.o. female with a medical diagnosis of Spondylodiscitis.  Pt had good tolerance of session, performing functional mobility and ambulating a short distance in her room with Min A-CGA.   She presents with the following. Performance deficits affecting function are weakness, impaired endurance, impaired self care skills, impaired functional mobilty, gait instability, impaired balance, pain, decreased safety awareness, decreased lower extremity function, orthopedic precautions, decreased ROM.     Rehab Prognosis:  Good; patient would benefit from acute skilled OT services to address these deficits and reach maximum level of function.       Plan:     Patient to be seen 3 x/week to address the above listed problems via self-care/home management, therapeutic exercises, therapeutic activities  · Plan of Care Expires: 03/21/22  · Plan of Care Reviewed with: patient    Subjective   "I'm always going to have some pain."  Pain/Comfort:  · Pain Rating 1: other (see comments) (not rated; pt said "not that bad")  · Location 1: back  · Pain Addressed 1: Distraction, Reposition  · Pain Rating Post-Intervention 1: other (see comments) (see above)    Objective:     Communicated with: nursing prior to session.  Patient found sitting on bedside couch with hemovac, telemetry, wound vac with her family present upon OT entry to room.    General Precautions: Standard, fall   Orthopedic Precautions:spinal precautions   Braces: TLSO  Respiratory Status: Room air     Occupational Performance:     Bed Mobility:    · N/a due to pt sitting on the couch at beginning and " in the chair at end of session     Functional Mobility/Transfers:  · Patient completed Sit <> Stand Transfer from bedside couch and chair x 1 trial each with minimum assistance  with  hand-held assist   · Patient completed Couch <> Chair Transfer using Step Transfer technique with contact guard assistance - Min A with hand-held assist  · Functional Mobility: Pt ambulated ~12 ft from the couch to the chair with CGA with HHA while pushing her IV pole.  She had no LOB or complaints of dizziness.      Activities of Daily Living:  · Upper Body Dressing: moderate assistance to dalton back gown as a robe while sitting on the bedside couch      [facility] 6 Click ADL: 15    Treatment & Education:  Pt edu on role of OT, POC, her spinal precautions, benefit of performing OOB activity, and safety when performing functional transfers and mobility.    - Self care tasks completed-- as noted above       Patient left up in chair with all lines intact, call button in reach and her family presentEducation:      GOALS:   Multidisciplinary Problems     Occupational Therapy Goals        Problem: Occupational Therapy Goal    Goal Priority Disciplines Outcome Interventions   Occupational Therapy Goal     OT, PT/OT Ongoing, Progressing    Description: Goals to be met by: 3/22/2022     Patient will increase functional independence with ADLs by performing:    Feeding with [place].  UE Dressing with Stand-by Assistance.  LE Dressing with Stand-by Assistance.  Grooming while standing at sink with Set-up Assistance.  Toileting from toilet with Stand-by Assistance for hygiene and clothing management.   Toilet transfer to toilet with Contact Guard Assistance.  Pt able to verbalize and adhere to all spinal precautions 100% of the time.  Pt can don/doff TLSO brace stand-by assist                       Time Tracking:     OT Date of Treatment: 03/07/22  OT Start Time: 1538  OT Stop Time: 1601  OT Total Time (min): 23 min    Billable Minutes:Therapeutic  Activity 23 min    OT/ROSS: OT          3/7/2022

## 2022-03-07 NOTE — CONSULTS
"[facility] - Neurosurgery ([facility])  Wound Care    Patient Name:  Rachel Zazueta   MRN:  [id number]  Date: 3/7/2022  Diagnosis: Spondylodiscitis    History:     Past Medical History:   Diagnosis Date    Anemia     Diskitis     Hep C w/o coma, chronic     IV drug abuse     Liver cirrhosis        Social History     Socioeconomic History    Marital status:    Tobacco Use    Smoking status: Current Some Day Smoker     Packs/day: 0.25     Years: 23.00     Pack years: 5.75     Types: Cigarettes    Smokeless tobacco: Never Used   Substance and Sexual Activity    Alcohol use: Not Currently    Drug use: Yes     Frequency: 4.0 times per week     Types: Methamphetamines, Marijuana     Comment: Cannibis 1/month.  Meth- Injection, 1/w, injection in the right hand and wrist. Denies shared needles with other people, but occasional reuse at times. In addition endorses using a "fresh point" for sites of injection. Last use 3 days prior that was inhal    Sexual activity: Yes     Partners: Male     Birth control/protection: None     Social Determinants of Health     Financial Resource Strain: Medium Risk    Difficulty of Paying Living Expenses: Somewhat hard   Food Insecurity: No Food Insecurity    Worried About Running Out of Food in the Last Year: Never true    Ran Out of Food in the Last Year: Never true   Transportation Needs: Unmet Transportation Needs    Lack of Transportation (Medical): Yes    Lack of Transportation (Non-Medical): Yes   Physical Activity: Inactive    Days of Exercise per Week: 0 days    Minutes of Exercise per Session: 0 min   Stress: Stress Concern Present    Feeling of Stress : Very much   Social Connections: Unknown    Frequency of Communication with Friends and Family: Once a week    Frequency of Social Gatherings with Friends and Family: Once a week    Active Member of Clubs or Organizations: No    Marital Status: Living with partner   Housing Stability: Low Risk     Unable " to Pay for Housing in the Last Year: No    Number of Places Lived in the Last Year: 2    Unstable Housing in the Last Year: No       Precautions:     Allergies as of 02/14/2022 - Reviewed 02/14/2022   Allergen Reaction Noted    Bee venom protein (honey bee) Anaphylaxis 03/20/2017    Wasp sting [allergen ext-venom-honey bee] Anaphylaxis 03/20/2017    Adhesive Blisters 04/25/2021    [place] Hives 04/14/2021    Iodine and iodide containing products Hives 03/04/2015    Naproxen Other (See Comments) 03/04/2015    Shellfish containing products  10/04/2018    Nuts [tree nut] Rash 03/04/2015       WO Assessment Details/Treatment     Wound care consult received from RN for assessment perineal area. Met with patient this morning. Noted intact skin to perineum. Patient with moisture to skin folds. Recommend miconazole powder to abdominal skin folds, groin and perineum to manage moisture. Nursing and MD team notified with recommendations. Orders placed. Wound care to sign off. Please re-consult for any concerns.         03/07/2022

## 2022-03-07 NOTE — ASSESSMENT & PLAN NOTE
Rachel Zazueta is a 41 F with PMH hepatitis C, cirrhosis, pancytopenia, nicotine abuse, hx IVDU (last use 2 years ago), strep agalactae bacteremia, s/p L3-5 TLIF on 4/16/2021 who presents with persistent and progressive lumbar spondylodiscitis with resultant hardware failure and new scoliosis.     S/p hardware removal on 2/21. Now s/p T10 t pelvis posterior instrumented fusion with L5-S1 TLIF on 3/2.     Plan:  --Patient is floor status.       -q4h neurochecks.  --Post op scoliosis XR pending.   --All labs and diagnostics personally reviewed.  --Follow-up MRI Brain, C/T/L w/wo contrast:   -No evidence of osteomyelitis, discitis, or epidural abscess within the cervical or thoracic spine   -No diffusion positive or abnormal enhancement within the brain   -MRI brain shows patchy nonspecific increased T2 and FLAIR signal in the periventricular and subcortical white matter bilaterally. Could be due to chronic microvascular ischemic changes vs demyelinating disease given patient's age. No evidence of active demyelination.   -DDD C4-7 with mild to moderate canal stenosis  --Full infectious w/u.   -Inflammatory markers wnl. BCx 2/14 NGTD, UA/Ucx pending. Wound Cx from 2/21 neg. Wound Cx from 3/2 pending.    -F/u ID recs - now on Rocephin x 8 weeks. PICC placed.    -Will need placement for IV abx therapy given h/o of IVDU.  --S/p L3-4 disc biopsy and L3 bone biopsy with IR 2/15 - Gram stain rare WBC. Cx NGTD.  --Utox + for amphetamines, addiction psych consulted per HM.   --Hold anti-plt/coag medications.  --Monitor for urinary retention - voiding spontaneously. Bladder scan prn.  --Pain control: Oxy 10 q4h PRN for moderate pain, Oxy 20 q4h PRN for severe pain, Dilaudid 1 mg q4h PRN when pain not relieved by PO meds. Continue lyrica 50 mg TID.  --Drains: to gravity. Keep until POD 5.  --WV removed.   --Plt stable this am; goal > 80k  --H&H stable this am.  --DVT ppx: TEDs/SCDs  --Bowel regimen: Miralax, senna. +  BM.  --Activity: PT/OT OOB, TLSO brace when OOB.  --Continue to monitor clinically, notify NSGY immediately with any changes in neuro status.    Discussed with Dr. Templeton.    Dispo: Anticipate rehab.

## 2022-03-07 NOTE — PROGRESS NOTES
[facility] - Neurosurgery ([facility])  Neurosurgery  Progress Note    Subjective:     History of Present Illness: Rachel Zazueta is a 41 y.o.  female with PMHx of Hepatitis C, liver cirrhosis, pancytopenia, and polysubstance abuse, who presents to clinic for follow up with new imaging s/p L3 and L4 laminectomy for evacuation of epidural abscess and L3-L5 TLIF on 04/16/2021.     The pt c/o worsening back and left leg pain since October. She states that she is no longer using IV drugs. States only using marijuana. Describes the left leg pain as a shock down the leg when standing for 10 minutes. Denies taking any antibiotics. Endorses new weakness in the legs. Denies b/b dysfunction. Denies new neck pain. She ambulates with a walker at home. States that she smoke.    She presents to ED as direct admit from clinic.       Post-Op Info:  Procedure(s) (LRB):  FUSION, SPINE, LUMBAR (Bilateral)   5 Days Post-Op     Interval History: POD 5. NAEON. Afebrile, VSS. H&H and platelets stable. Neuro exam is stable. Patient sitting up in chair upon entry to room, states her pain is well controlled. Tolerating PO, voiding spontaneously and has had BM. WV removed today.    Medications:  Continuous Infusions:   sodium chloride 0.9% Stopped (03/03/22 1708)     Scheduled Meds:   acetaminophen  1,000 mg Oral Q6H    cefTRIAXone (ROCEPHIN) IVPB  2 g Intravenous Q12H    LIDOcaine  1 patch Transdermal Q24H    miconazole NITRATE 2 %   Topical (Top) BID    pantoprazole  40 mg Oral Daily    pregabalin  75 mg Oral TID    senna-docusate 8.6-50 mg  1 tablet Oral Daily    sertraline  50 mg Oral Daily    sodium chloride 0.9%  10 mL Intravenous Q6H     PRN Meds:sodium chloride, sodium chloride, aluminum-magnesium hydroxide-simethicone, dextrose 10%, dextrose 10%, glucagon (human recombinant), glucose, glucose, HYDROmorphone, melatonin, oxyCODONE, oxyCODONE, polyethylene glycol, Flushing PICC Protocol **AND** sodium chloride 0.9% **AND**  sodium chloride 0.9%       Objective:     Weight: 133.8 kg (294 lb 15.6 oz)  Body mass index is 46.2 kg/m².  Vital Signs (Most Recent):  Temp: 98.1 °F (36.7 °C) (03/07/22 1127)  Pulse: 98 (03/07/22 1127)  Resp: 18 (03/07/22 1127)  BP: (!) 104/58 (03/07/22 1127)  SpO2: 97 % (03/07/22 1127)   Vital Signs (24h Range):  Temp:  [96.6 °F (35.9 °C)-99.2 °F (37.3 °C)] 98.1 °F (36.7 °C)  Pulse:  [] 98  Resp:  [17-20] 18  SpO2:  [96 %-99 %] 97 %  BP: ()/(53-67) 104/58                          Closed/Suction Drain 03/02/22 1208 Left;Superior Back Accordion 10 Fr. (Active)   Site Description Healing 03/05/22 1100   Dressing Type Transparent (Tegaderm) 03/06/22 1930   Dressing Status Clean;Dry;Intact 03/06/22 1930   Dressing Intervention Integrity maintained 03/06/22 1930   Drainage Sanguineous 03/06/22 1930   Status Open to gravity drainage 03/06/22 1930   Output (mL) 150 mL 03/07/22 0400            Closed/Suction Drain 03/02/22 1208 Right;Superior Back Accordion 10 Fr. (Active)   Site Description Healing 03/05/22 1100   Dressing Type Transparent (Tegaderm) 03/06/22 1930   Dressing Status Clean;Dry;Intact 03/06/22 1930   Dressing Intervention Integrity maintained 03/06/22 1930   Drainage Sanguineous 03/05/22 1100   Status Open to gravity drainage 03/05/22 1100   Output (mL) 5 mL 03/07/22 0400       Neurosurgery Physical Exam  General: Well developed, well nourished, not in acute distress.   Head: Normocephalic, atraumatic.  Neck: Full ROM.   Neurologic: Alert and oriented x 4. Thought content appropriate.  GCS: Motor:6  Verbal:5  Eyes:4   GCS Total: 15  Language: No aphasia.  Speech: No dysarthria.  Cranial nerves: Face symmetric, tongue midline, CN II-XII grossly intact.   Eyes: Pupils equal, round, reactive to light with accomodation, EOMI.   Pulmonary: Normal respirations, no signs of respiratory distress.  Abdomen: Soft, non-distended, non-tender to palpation.  Sensory: Intact to light touch throughout.  Motor  Strength: Moves all extremities spontaneously with good tone. No abnormal movements seen.      Strength   Deltoids Triceps Biceps Wrist Extension Wrist Flexion Hand    Upper: R 5/5 5/5 5/5 5/5 5/5 5/5     L 5/5 5/5 5/5 5/5 5/5 5/5       Hip Flexion Knee Extension Knee  Flexion Dorsiflexion Plantar flexion EHL   Lower: R 5/5 5/5 5/5 5/5 5/5 5/5     L 4/5 4/5 4/5 5/5 5/5 5/5      Parker: Absent.  Clonus: 3 beats bilaterally.  Skin: Skin is warm, dry and intact.     Posterior lumbar incision c/d/i with staples. No surrounding erythema or edema. No drainage from incision. No palpable hematoma or underlying fluid collection.     HV drains in place to gravity.     Significant Labs:  Recent Labs   Lab 03/06/22  0425 03/07/22  0351   MG 1.6 1.6     Recent Labs   Lab 03/06/22  0425 03/07/22  0351   WBC 2.38* 3.31*   HGB 7.7* 8.9*   HCT 25.1* 28.3*   PLT 68* 104*     Recent Labs   Lab 03/06/22  0425 03/07/22  0351   INR 1.3* 1.4*     Microbiology Results (last 7 days)       Procedure Component Value Units Date/Time    Aerobic culture [[id number]] Collected: 03/02/22 1228    Order Status: Completed Specimen: Back Updated: 03/05/22 1108     Aerobic Bacterial Culture No growth    Narrative:      L5/S1 DISC    Culture, Anaerobe [[id number]] Collected: 03/02/22 1228    Order Status: Completed Specimen: Back Updated: 03/04/22 0859     Anaerobic Culture Culture in progress    Narrative:      L5/S1 DISC    AFB Culture & Smear [[id number]] Collected: 03/02/22 1228    Order Status: Completed Specimen: Back Updated: 03/03/22 2127     AFB Culture & Smear Culture in progress     AFB CULTURE STAIN No acid fast bacilli seen.    Narrative:      L5/S1 DISC    Fungus culture [[id number]] Collected: 02/21/22 1539    Order Status: Completed Specimen: Abscess from Back Updated: 03/02/22 1435     Fungus (Mycology) Culture Culture in progress    Narrative:      Epidural purulence #1    Fungus culture [[id number]] Collected: 02/21/22 1539    Order  Status: Completed Specimen: Abscess from Back Updated: 03/02/22 1435     Fungus (Mycology) Culture Culture in progress    Narrative:      Epidural purulence #2    Gram stain [[id number]] Collected: 03/02/22 1228    Order Status: Completed Specimen: Back Updated: 03/02/22 1423     Gram Stain Result No WBC's      No organisms seen    Narrative:      L5/S1 DISC    Fungus culture [[id number]] Collected: 03/02/22 1228    Order Status: Sent Specimen: Back Updated: 03/02/22 1251    Fungus culture [[id number]] Collected: 02/15/22 1128    Order Status: Completed Specimen: Biopsy from Back Updated: 03/02/22 1028     Fungus (Mycology) Culture Culture in progress      No fungus isolated after 2 weeks    Narrative:      L3-4 / L4-5 osteodiscitis          All pertinent labs from the last 24 hours have been reviewed.    Significant Diagnostics:  I have reviewed and interpreted all pertinent imaging results/findings within the past 24 hours.    Assessment/Plan:     * Spondylodiscitis  Rachel Zazueta is a 41 F with PMH hepatitis C, cirrhosis, pancytopenia, nicotine abuse, hx IVDU (last use 2 years ago), strep agalactae bacteremia, s/p L3-5 TLIF on 4/16/2021 who presents with persistent and progressive lumbar spondylodiscitis with resultant hardware failure and new scoliosis.     S/p hardware removal on 2/21. Now s/p T10 t pelvis posterior instrumented fusion with L5-S1 TLIF on 3/2.     Plan:  --Patient is floor status.       -q4h neurochecks.  --Post op scoliosis XR pending.   --All labs and diagnostics personally reviewed.  --Follow-up MRI Brain, C/T/L w/wo contrast:   -No evidence of osteomyelitis, discitis, or epidural abscess within the cervical or thoracic spine   -No diffusion positive or abnormal enhancement within the brain   -MRI brain shows patchy nonspecific increased T2 and FLAIR signal in the periventricular and subcortical white matter bilaterally. Could be due to chronic microvascular ischemic changes vs  demyelinating disease given patient's age. No evidence of active demyelination.   -DDD C4-7 with mild to moderate canal stenosis  --Full infectious w/u.   -Inflammatory markers wnl. BCx 2/14 NGTD, UA/Ucx pending. Wound Cx from 2/21 neg. Wound Cx from 3/2 pending.    -F/u ID recs - now on Rocephin x 8 weeks. PICC placed.    -Will need placement for IV abx therapy given h/o of IVDU.  --S/p L3-4 disc biopsy and L3 bone biopsy with IR 2/15 - Gram stain rare WBC. Cx NGTD.  --Utox + for amphetamines, addiction psych consulted per .   --Hold anti-plt/coag medications.  --Monitor for urinary retention - voiding spontaneously. Bladder scan prn.  --Pain control: Oxy 10 q4h PRN for moderate pain, Oxy 20 q4h PRN for severe pain, Dilaudid 1 mg q4h PRN when pain not relieved by PO meds. Continue lyrica 50 mg TID.  --Drains: to gravity. Keep until POD 5.  --WV removed.   --Plt stable this am; goal > 80k  --H&H stable this am.  --DVT ppx: TEDs/SCDs  --Bowel regimen: Miralax, senna. + BM.  --Activity: PT/OT OOB, TLSO brace when OOB.  --Continue to monitor clinically, notify NSGY immediately with any changes in neuro status.    Discussed with Dr. Templeton.    Dispo: Anticipate rehab.        Afua Campbell PA-C  Neurosurgery  [facility] - Neurosurgery ([facility])

## 2022-03-07 NOTE — PLAN OF CARE
Problem: Adult Inpatient Plan of Care  Goal: Plan of Care Review  Outcome: Ongoing, Progressing  Flowsheets (Taken 3/7/2022 1151)  Plan of Care Reviewed With: patient  Goal: Patient-Specific Goal (Individualized)  Outcome: Ongoing, Progressing  Goal: Optimal Comfort and Wellbeing  Outcome: Ongoing, Progressing     Plan of care discussed with patient. Main patient goal is pain management.

## 2022-03-07 NOTE — SUBJECTIVE & OBJECTIVE
Interval History: POD 5. NAEON. Afebrile, VSS. H&H and platelets stable. Neuro exam is stable. Patient sitting up in chair upon entry to room, states her pain is well controlled. Tolerating PO, voiding spontaneously and has had BM. WV removed today.    Medications:  Continuous Infusions:   sodium chloride 0.9% Stopped (03/03/22 1708)     Scheduled Meds:   acetaminophen  1,000 mg Oral Q6H    cefTRIAXone (ROCEPHIN) IVPB  2 g Intravenous Q12H    LIDOcaine  1 patch Transdermal Q24H    miconazole NITRATE 2 %   Topical (Top) BID    pantoprazole  40 mg Oral Daily    pregabalin  75 mg Oral TID    senna-docusate 8.6-50 mg  1 tablet Oral Daily    sertraline  50 mg Oral Daily    sodium chloride 0.9%  10 mL Intravenous Q6H     PRN Meds:sodium chloride, sodium chloride, aluminum-magnesium hydroxide-simethicone, dextrose 10%, dextrose 10%, glucagon (human recombinant), glucose, glucose, HYDROmorphone, melatonin, oxyCODONE, oxyCODONE, polyethylene glycol, Flushing PICC Protocol **AND** sodium chloride 0.9% **AND** sodium chloride 0.9%       Objective:     Weight: 133.8 kg (294 lb 15.6 oz)  Body mass index is 46.2 kg/m².  Vital Signs (Most Recent):  Temp: 98.1 °F (36.7 °C) (03/07/22 1127)  Pulse: 98 (03/07/22 1127)  Resp: 18 (03/07/22 1127)  BP: (!) 104/58 (03/07/22 1127)  SpO2: 97 % (03/07/22 1127)   Vital Signs (24h Range):  Temp:  [96.6 °F (35.9 °C)-99.2 °F (37.3 °C)] 98.1 °F (36.7 °C)  Pulse:  [] 98  Resp:  [17-20] 18  SpO2:  [96 %-99 %] 97 %  BP: ()/(53-67) 104/58                          Closed/Suction Drain 03/02/22 1208 Left;Superior Back Accordion 10 Fr. (Active)   Site Description Healing 03/05/22 1100   Dressing Type Transparent (Tegaderm) 03/06/22 1930   Dressing Status Clean;Dry;Intact 03/06/22 1930   Dressing Intervention Integrity maintained 03/06/22 1930   Drainage Sanguineous 03/06/22 1930   Status Open to gravity drainage 03/06/22 1930   Output (mL) 150 mL 03/07/22 0400            Closed/Suction Drain  03/02/22 1208 Right;Superior Back Accordion 10 Fr. (Active)   Site Description Healing 03/05/22 1100   Dressing Type Transparent (Tegaderm) 03/06/22 1930   Dressing Status Clean;Dry;Intact 03/06/22 1930   Dressing Intervention Integrity maintained 03/06/22 1930   Drainage Sanguineous 03/05/22 1100   Status Open to gravity drainage 03/05/22 1100   Output (mL) 5 mL 03/07/22 0400       Neurosurgery Physical Exam  General: Well developed, well nourished, not in acute distress.   Head: Normocephalic, atraumatic.  Neck: Full ROM.   Neurologic: Alert and oriented x 4. Thought content appropriate.  GCS: Motor:6  Verbal:5  Eyes:4   GCS Total: 15  Language: No aphasia.  Speech: No dysarthria.  Cranial nerves: Face symmetric, tongue midline, CN II-XII grossly intact.   Eyes: Pupils equal, round, reactive to light with accomodation, EOMI.   Pulmonary: Normal respirations, no signs of respiratory distress.  Abdomen: Soft, non-distended, non-tender to palpation.  Sensory: Intact to light touch throughout.  Motor Strength: Moves all extremities spontaneously with good tone. No abnormal movements seen.      Strength   Deltoids Triceps Biceps Wrist Extension Wrist Flexion Hand    Upper: R 5/5 5/5 5/5 5/5 5/5 5/5     L 5/5 5/5 5/5 5/5 5/5 5/5       Hip Flexion Knee Extension Knee  Flexion Dorsiflexion Plantar flexion EHL   Lower: R 5/5 5/5 5/5 5/5 5/5 5/5     L 4/5 4/5 4/5 5/5 5/5 5/5      Parker: Absent.  Clonus: 3 beats bilaterally.  Skin: Skin is warm, dry and intact.     Posterior lumbar incision c/d/i with staples. No surrounding erythema or edema. No drainage from incision. No palpable hematoma or underlying fluid collection.     HV drains in place to gravity.     Significant Labs:  Recent Labs   Lab 03/06/22  0425 03/07/22  0351   MG 1.6 1.6     Recent Labs   Lab 03/06/22  0425 03/07/22  0351   WBC 2.38* 3.31*   HGB 7.7* 8.9*   HCT 25.1* 28.3*   PLT 68* 104*     Recent Labs   Lab 03/06/22  0425 03/07/22  0351   INR 1.3*  1.4*     Microbiology Results (last 7 days)       Procedure Component Value Units Date/Time    Aerobic culture [[id number]] Collected: 03/02/22 1228    Order Status: Completed Specimen: Back Updated: 03/05/22 1108     Aerobic Bacterial Culture No growth    Narrative:      L5/S1 DISC    Culture, Anaerobe [[id number]] Collected: 03/02/22 1228    Order Status: Completed Specimen: Back Updated: 03/04/22 0859     Anaerobic Culture Culture in progress    Narrative:      L5/S1 DISC    AFB Culture & Smear [[id number]] Collected: 03/02/22 1228    Order Status: Completed Specimen: Back Updated: 03/03/22 2127     AFB Culture & Smear Culture in progress     AFB CULTURE STAIN No acid fast bacilli seen.    Narrative:      L5/S1 DISC    Fungus culture [[id number]] Collected: 02/21/22 1539    Order Status: Completed Specimen: Abscess from Back Updated: 03/02/22 1435     Fungus (Mycology) Culture Culture in progress    Narrative:      Epidural purulence #1    Fungus culture [[id number]] Collected: 02/21/22 1539    Order Status: Completed Specimen: Abscess from Back Updated: 03/02/22 1435     Fungus (Mycology) Culture Culture in progress    Narrative:      Epidural purulence #2    Gram stain [[id number]] Collected: 03/02/22 1228    Order Status: Completed Specimen: Back Updated: 03/02/22 1423     Gram Stain Result No WBC's      No organisms seen    Narrative:      L5/S1 DISC    Fungus culture [[id number]] Collected: 03/02/22 1228    Order Status: Sent Specimen: Back Updated: 03/02/22 1251    Fungus culture [[id number]] Collected: 02/15/22 1128    Order Status: Completed Specimen: Biopsy from Back Updated: 03/02/22 1028     Fungus (Mycology) Culture Culture in progress      No fungus isolated after 2 weeks    Narrative:      L3-4 / L4-5 osteodiscitis          All pertinent labs from the last 24 hours have been reviewed.    Significant Diagnostics:  I have reviewed and interpreted all pertinent imaging results/findings within the past 24  hours.

## 2022-03-08 LAB
ABO + RH BLD: NORMAL
ALBUMIN SERPL BCP-MCNC: 2.1 G/DL (ref 3.5–5.2)
ALP SERPL-CCNC: 87 U/L (ref 55–135)
ALT SERPL W/O P-5'-P-CCNC: 21 U/L (ref 10–44)
ANION GAP SERPL CALC-SCNC: 7 MMOL/L (ref 8–16)
AST SERPL-CCNC: 44 U/L (ref 10–40)
BASOPHILS # BLD AUTO: 0.02 K/UL (ref 0–0.2)
BASOPHILS NFR BLD: 0.9 % (ref 0–1.9)
BILIRUB SERPL-MCNC: 1.8 MG/DL (ref 0.1–1)
BLD GP AB SCN CELLS X3 SERPL QL: NORMAL
BLD PROD TYP BPU: NORMAL
BLOOD UNIT EXPIRATION DATE: NORMAL
BLOOD UNIT TYPE CODE: 6200
BLOOD UNIT TYPE: NORMAL
BUN SERPL-MCNC: 18 MG/DL (ref 6–20)
CALCIUM SERPL-MCNC: 8 MG/DL (ref 8.7–10.5)
CHLORIDE SERPL-SCNC: 106 MMOL/L (ref 95–110)
CO2 SERPL-SCNC: 26 MMOL/L (ref 23–29)
CODING SYSTEM: NORMAL
CREAT SERPL-MCNC: 0.5 MG/DL (ref 0.5–1.4)
DIFFERENTIAL METHOD: ABNORMAL
DISPENSE STATUS: NORMAL
EOSINOPHIL # BLD AUTO: 0.1 K/UL (ref 0–0.5)
EOSINOPHIL NFR BLD: 3.1 % (ref 0–8)
ERYTHROCYTE [DISTWIDTH] IN BLOOD BY AUTOMATED COUNT: 17 % (ref 11.5–14.5)
EST. GFR  (AFRICAN AMERICAN): >60 ML/MIN/1.73 M^2
EST. GFR  (NON AFRICAN AMERICAN): >60 ML/MIN/1.73 M^2
GLUCOSE SERPL-MCNC: 83 MG/DL (ref 70–110)
HCT VFR BLD AUTO: 24.5 % (ref 37–48.5)
HGB BLD-MCNC: 7.7 G/DL (ref 12–16)
IMM GRANULOCYTES # BLD AUTO: 0.01 K/UL (ref 0–0.04)
IMM GRANULOCYTES NFR BLD AUTO: 0.4 % (ref 0–0.5)
INR PPP: 1.4 (ref 0.8–1.2)
LYMPHOCYTES # BLD AUTO: 0.4 K/UL (ref 1–4.8)
LYMPHOCYTES NFR BLD: 16.2 % (ref 18–48)
MAGNESIUM SERPL-MCNC: 1.7 MG/DL (ref 1.6–2.6)
MCH RBC QN AUTO: 29.4 PG (ref 27–31)
MCHC RBC AUTO-ENTMCNC: 31.4 G/DL (ref 32–36)
MCV RBC AUTO: 94 FL (ref 82–98)
MONOCYTES # BLD AUTO: 0.2 K/UL (ref 0.3–1)
MONOCYTES NFR BLD: 10.1 % (ref 4–15)
NEUTROPHILS # BLD AUTO: 1.6 K/UL (ref 1.8–7.7)
NEUTROPHILS NFR BLD: 69.3 % (ref 38–73)
NRBC BLD-RTO: 0 /100 WBC
PHOSPHATE SERPL-MCNC: 3.9 MG/DL (ref 2.7–4.5)
PLATELET # BLD AUTO: 60 K/UL (ref 150–450)
PMV BLD AUTO: 11.1 FL (ref 9.2–12.9)
POTASSIUM SERPL-SCNC: 3.9 MMOL/L (ref 3.5–5.1)
PROT SERPL-MCNC: 5 G/DL (ref 6–8.4)
PROTHROMBIN TIME: 13.9 SEC (ref 9–12.5)
RBC # BLD AUTO: 2.62 M/UL (ref 4–5.4)
SODIUM SERPL-SCNC: 139 MMOL/L (ref 136–145)
UNIT NUMBER: NORMAL
WBC # BLD AUTO: 2.28 K/UL (ref 3.9–12.7)

## 2022-03-08 PROCEDURE — 97530 THERAPEUTIC ACTIVITIES: CPT | Mod: CQ

## 2022-03-08 PROCEDURE — 63600175 PHARM REV CODE 636 W HCPCS: Performed by: PHYSICIAN ASSISTANT

## 2022-03-08 PROCEDURE — 25000003 PHARM REV CODE 250

## 2022-03-08 PROCEDURE — 63600175 PHARM REV CODE 636 W HCPCS: Performed by: NURSE PRACTITIONER

## 2022-03-08 PROCEDURE — 84100 ASSAY OF PHOSPHORUS: CPT | Performed by: PSYCHIATRY & NEUROLOGY

## 2022-03-08 PROCEDURE — A4216 STERILE WATER/SALINE, 10 ML: HCPCS | Performed by: NEUROLOGICAL SURGERY

## 2022-03-08 PROCEDURE — 25000003 PHARM REV CODE 250: Performed by: NEUROLOGICAL SURGERY

## 2022-03-08 PROCEDURE — 25000003 PHARM REV CODE 250: Performed by: STUDENT IN AN ORGANIZED HEALTH CARE EDUCATION/TRAINING PROGRAM

## 2022-03-08 PROCEDURE — 86920 COMPATIBILITY TEST SPIN: CPT

## 2022-03-08 PROCEDURE — C1751 CATH, INF, PER/CENT/MIDLINE: HCPCS

## 2022-03-08 PROCEDURE — 10060 I&D ABSCESS SIMPLE/SINGLE: CPT

## 2022-03-08 PROCEDURE — C1729 CATH, DRAINAGE: HCPCS

## 2022-03-08 PROCEDURE — 85025 COMPLETE CBC W/AUTO DIFF WBC: CPT | Performed by: STUDENT IN AN ORGANIZED HEALTH CARE EDUCATION/TRAINING PROGRAM

## 2022-03-08 PROCEDURE — 36430 TRANSFUSION BLD/BLD COMPNT: CPT

## 2022-03-08 PROCEDURE — P9035 PLATELET PHERES LEUKOREDUCED: HCPCS | Performed by: PHYSICIAN ASSISTANT

## 2022-03-08 PROCEDURE — 11000001 HC ACUTE MED/SURG PRIVATE ROOM

## 2022-03-08 PROCEDURE — 83735 ASSAY OF MAGNESIUM: CPT | Performed by: PSYCHIATRY & NEUROLOGY

## 2022-03-08 PROCEDURE — 97116 GAIT TRAINING THERAPY: CPT | Mod: CQ

## 2022-03-08 PROCEDURE — 99024 PR POST-OP FOLLOW-UP VISIT: ICD-10-PCS | Mod: ,,, | Performed by: PHYSICIAN ASSISTANT

## 2022-03-08 PROCEDURE — 86850 RBC ANTIBODY SCREEN: CPT

## 2022-03-08 PROCEDURE — 99024 POSTOP FOLLOW-UP VISIT: CPT | Mod: ,,, | Performed by: PHYSICIAN ASSISTANT

## 2022-03-08 PROCEDURE — 85610 PROTHROMBIN TIME: CPT

## 2022-03-08 PROCEDURE — 80053 COMPREHEN METABOLIC PANEL: CPT

## 2022-03-08 RX ORDER — HYDROCODONE BITARTRATE AND ACETAMINOPHEN 500; 5 MG/1; MG/1
TABLET ORAL
Status: DISCONTINUED | OUTPATIENT
Start: 2022-03-08 | End: 2022-03-15 | Stop reason: HOSPADM

## 2022-03-08 RX ADMIN — ACETAMINOPHEN 1000 MG: 325 TABLET ORAL at 06:03

## 2022-03-08 RX ADMIN — CEFTRIAXONE 2 G: 2 INJECTION, POWDER, FOR SOLUTION INTRAMUSCULAR; INTRAVENOUS at 03:03

## 2022-03-08 RX ADMIN — Medication 10 ML: at 05:03

## 2022-03-08 RX ADMIN — OXYCODONE HYDROCHLORIDE 20 MG: 10 TABLET ORAL at 06:03

## 2022-03-08 RX ADMIN — Medication 10 ML: at 12:03

## 2022-03-08 RX ADMIN — HYDROMORPHONE HYDROCHLORIDE 2 MG: 1 INJECTION, SOLUTION INTRAMUSCULAR; INTRAVENOUS; SUBCUTANEOUS at 08:03

## 2022-03-08 RX ADMIN — PREGABALIN 75 MG: 75 CAPSULE ORAL at 08:03

## 2022-03-08 RX ADMIN — OXYCODONE HYDROCHLORIDE 20 MG: 10 TABLET ORAL at 05:03

## 2022-03-08 RX ADMIN — PANTOPRAZOLE SODIUM 40 MG: 20 TABLET, DELAYED RELEASE ORAL at 08:03

## 2022-03-08 RX ADMIN — CEFTRIAXONE 2 G: 2 INJECTION, POWDER, FOR SOLUTION INTRAMUSCULAR; INTRAVENOUS at 02:03

## 2022-03-08 RX ADMIN — Medication 10 ML: at 06:03

## 2022-03-08 RX ADMIN — SENNOSIDES AND DOCUSATE SODIUM 1 TABLET: 50; 8.6 TABLET ORAL at 08:03

## 2022-03-08 RX ADMIN — HYDROMORPHONE HYDROCHLORIDE 2 MG: 1 INJECTION, SOLUTION INTRAMUSCULAR; INTRAVENOUS; SUBCUTANEOUS at 02:03

## 2022-03-08 RX ADMIN — MICONAZOLE NITRATE 2 % TOPICAL POWDER: at 08:03

## 2022-03-08 RX ADMIN — PREGABALIN 75 MG: 75 CAPSULE ORAL at 02:03

## 2022-03-08 RX ADMIN — Medication 10 ML: at 01:03

## 2022-03-08 RX ADMIN — OXYCODONE HYDROCHLORIDE 20 MG: 10 TABLET ORAL at 10:03

## 2022-03-08 RX ADMIN — SERTRALINE HYDROCHLORIDE 50 MG: 50 TABLET ORAL at 08:03

## 2022-03-08 RX ADMIN — HYDROMORPHONE HYDROCHLORIDE 2 MG: 1 INJECTION, SOLUTION INTRAMUSCULAR; INTRAVENOUS; SUBCUTANEOUS at 01:03

## 2022-03-08 RX ADMIN — Medication 10 ML: at 08:03

## 2022-03-08 NOTE — SUBJECTIVE & OBJECTIVE
Interval History: NAEON. AFVSS. Platelets 60. Will transfuse today. Pain controlled. Voiding spontaneously. Up in chair.     Medications:  Continuous Infusions:   sodium chloride 0.9% Stopped (03/03/22 1708)     Scheduled Meds:   acetaminophen  1,000 mg Oral Q6H    cefTRIAXone (ROCEPHIN) IVPB  2 g Intravenous Q12H    LIDOcaine  1 patch Transdermal Q24H    miconazole NITRATE 2 %   Topical (Top) BID    pantoprazole  40 mg Oral Daily    pregabalin  75 mg Oral TID    senna-docusate 8.6-50 mg  1 tablet Oral Daily    sertraline  50 mg Oral Daily    sodium chloride 0.9%  10 mL Intravenous Q6H     PRN Meds:sodium chloride, aluminum-magnesium hydroxide-simethicone, dextrose 10%, dextrose 10%, glucagon (human recombinant), glucose, glucose, HYDROmorphone, melatonin, oxyCODONE, oxyCODONE, polyethylene glycol, Flushing PICC Protocol **AND** sodium chloride 0.9% **AND** sodium chloride 0.9%     Review of Systems  Objective:     Weight: 133.8 kg (294 lb 15.6 oz)  Body mass index is 46.2 kg/m².  Vital Signs (Most Recent):  Temp: 98 °F (36.7 °C) (03/08/22 1300)  Pulse: (!) 113 (03/08/22 1300)  Resp: 15 (03/08/22 1409)  BP: 125/65 (03/08/22 1300)  SpO2: 97 % (03/08/22 1300)   Vital Signs (24h Range):  Temp:  [96.7 °F (35.9 °C)-98.5 °F (36.9 °C)] 98 °F (36.7 °C)  Pulse:  [108-115] 113  Resp:  [15-18] 15  SpO2:  [95 %-99 %] 97 %  BP: ()/(53-80) 125/65     Date 03/08/22 0700 - 03/09/22 0659   Shift 0776-2455 2324-3501 3000-5564 24 Hour Total   INTAKE   Blood 264   264   Shift Total(mL/kg) 264(2)   264(2)   OUTPUT   Drains 85   85   Shift Total(mL/kg) 85(0.6)   85(0.6)   Weight (kg) 133.8 133.8 133.8 133.8                        Closed/Suction Drain 03/02/22 1208 Left;Superior Back Accordion 10 Fr. (Active)   $ Drain Supplies Drainage Catheter 03/08/22 0800   Site Description Healing 03/08/22 0600   Dressing Type Other (Comment) 03/08/22 0600   Dressing Status Clean;Dry;Intact 03/08/22 0600   Dressing Intervention Integrity  maintained 03/08/22 0600   Drainage Sanguineous 03/06/22 1930   Status Open to gravity drainage 03/08/22 1200   Output (mL) 45 mL 03/08/22 1208            Closed/Suction Drain 03/02/22 1208 Right;Superior Back Accordion 10 Fr. (Active)   $ Drain Supplies Drainage Catheter 03/08/22 0800   Site Description Healing 03/05/22 1100   Dressing Type Transparent (Tegaderm) 03/06/22 1930   Dressing Status Clean;Dry;Intact 03/08/22 0600   Dressing Intervention Integrity maintained 03/08/22 0600   Drainage Sanguineous 03/05/22 1100   Status Open to gravity drainage 03/08/22 1200   Output (mL) 0 mL 03/08/22 1209   Neurosurgery Physical Exam  General: Well developed, well nourished, not in acute distress.   Head: Normocephalic, atraumatic.  Neck: Full ROM.   Neurologic: Alert and oriented x 4. Thought content appropriate.  GCS: Motor:6  Verbal:5  Eyes:4   GCS Total: 15  Language: No aphasia.  Speech: No dysarthria.  Cranial nerves: Face symmetric, tongue midline, CN II-XII grossly intact.   Eyes: Pupils equal, round, reactive to light with accomodation, EOMI.   Pulmonary: Normal respirations, no signs of respiratory distress.  Abdomen: Soft, non-distended, non-tender to palpation.  Sensory: Intact to light touch throughout.  Motor Strength: Moves all extremities spontaneously with good tone. No abnormal movements seen.      Strength   Deltoids Triceps Biceps Wrist Extension Wrist Flexion Hand    Upper: R 5/5 5/5 5/5 5/5 5/5 5/5     L 5/5 5/5 5/5 5/5 5/5 5/5       Hip Flexion Knee Extension Knee  Flexion Dorsiflexion Plantar flexion EHL   Lower: R 5/5 5/5 5/5 5/5 5/5 5/5     L 4/5 4/5 4/5 5/5 5/5 5/5      Parker: Absent.  Clonus: 3 beats bilaterally.  Skin: Skin is warm, dry and intact.     Posterior lumbar incision c/d/i with staples. No surrounding erythema or edema. No drainage from incision. No palpable hematoma or underlying fluid collection.     HV drains in place to gravity.   Significant Labs:  Recent Labs   Lab  03/07/22  0351 03/08/22  0554   GLU  --  83   NA  --  139   K  --  3.9   CL  --  106   CO2  --  26   BUN  --  18   CREATININE  --  0.5   CALCIUM  --  8.0*   MG 1.6 1.7     Recent Labs   Lab 03/07/22  0351 03/08/22  0554   WBC 3.31* 2.28*   HGB 8.9* 7.7*   HCT 28.3* 24.5*   * 60*     Recent Labs   Lab 03/07/22  0351 03/08/22  0554   INR 1.4* 1.4*     Microbiology Results (last 7 days)       Procedure Component Value Units Date/Time    Culture, Anaerobe [[id number]] Collected: 03/02/22 1228    Order Status: Completed Specimen: Back Updated: 03/08/22 1037     Anaerobic Culture Culture in progress    Narrative:      L5/S1 DISC    Aerobic culture [[id number]] Collected: 03/02/22 1228    Order Status: Completed Specimen: Back Updated: 03/05/22 1108     Aerobic Bacterial Culture No growth    Narrative:      L5/S1 DISC    AFB Culture & Smear [[id number]] Collected: 03/02/22 1228    Order Status: Completed Specimen: Back Updated: 03/03/22 2127     AFB Culture & Smear Culture in progress     AFB CULTURE STAIN No acid fast bacilli seen.    Narrative:      L5/S1 DISC    Fungus culture [[id number]] Collected: 02/21/22 1539    Order Status: Completed Specimen: Abscess from Back Updated: 03/02/22 1435     Fungus (Mycology) Culture Culture in progress    Narrative:      Epidural purulence #1    Fungus culture [[id number]] Collected: 02/21/22 1539    Order Status: Completed Specimen: Abscess from Back Updated: 03/02/22 1435     Fungus (Mycology) Culture Culture in progress    Narrative:      Epidural purulence #2    Gram stain [[id number]] Collected: 03/02/22 1228    Order Status: Completed Specimen: Back Updated: 03/02/22 1423     Gram Stain Result No WBC's      No organisms seen    Narrative:      L5/S1 DISC    Fungus culture [[id number]] Collected: 03/02/22 1228    Order Status: Sent Specimen: Back Updated: 03/02/22 1251    Fungus culture [[id number]] Collected: 02/15/22 1128    Order Status: Completed Specimen: Biopsy from Back  Updated: 03/02/22 1028     Fungus (Mycology) Culture Culture in progress      No fungus isolated after 2 weeks    Narrative:      L3-4 / L4-5 osteodiscitis          All pertinent labs from the last 24 hours have been reviewed.    Significant Diagnostics:  I have reviewed all pertinent imaging results/findings within the past 24 hours.

## 2022-03-08 NOTE — ASSESSMENT & PLAN NOTE
Rachel Zazueta is a 41 F with PMH hepatitis C, cirrhosis, pancytopenia, nicotine abuse, hx IVDU (last use 2 years ago), strep agalactae bacteremia, s/p L3-5 TLIF on 4/16/2021 who presents with persistent and progressive lumbar spondylodiscitis with resultant hardware failure and new scoliosis.     S/p hardware removal on 2/21. Now s/p T10 t pelvis posterior instrumented fusion with L5-S1 TLIF on 3/2.     Plan:  --Patient is floor status.       -q4h neurochecks.  --Post op scoliosis obtained with good hardware placement.  --All labs and diagnostics personally reviewed.  --Follow-up MRI Brain, C/T/L w/wo contrast:   -No evidence of osteomyelitis, discitis, or epidural abscess within the cervical or thoracic spine   -No diffusion positive or abnormal enhancement within the brain   -MRI brain shows patchy nonspecific increased T2 and FLAIR signal in the periventricular and subcortical white matter bilaterally. Could be due to chronic microvascular ischemic changes vs demyelinating disease given patient's age. No evidence of active demyelination.   -DDD C4-7 with mild to moderate canal stenosis  --Full infectious w/u.   -Inflammatory markers wnl. BCx 2/14 NGTD, UA/Ucx pending. Wound Cx from 2/21 neg. Wound Cx from 3/2 pending.    -F/u ID recs - now on Rocephin x 8 weeks. PICC placed.    -Will need placement for IV abx therapy given h/o of IVDU.  --S/p L3-4 disc biopsy and L3 bone biopsy with IR 2/15 - Gram stain rare WBC. Cx NGTD.  --Utox + for amphetamines, addiction psych consulted per .   --Hold anti-plt/coag medications.  --Monitor for urinary retention - voiding spontaneously. Bladder scan prn.  --Pain control: Oxy 10 q4h PRN for moderate pain, Oxy 20 q4h PRN for severe pain, Dilaudid 1 mg q4h PRN when pain not relieved by PO meds. Continue lyrica 50 mg TID.  --Drains: to gravity. Continue IV abx.  --Plt stable this am; goal > 80k  --H&H stable this am.  --DVT ppx: TEDs/SCDs  --Bowel regimen: Miralax, senna. +  BM.  --Activity: PT/OT OOB, TLSO brace when OOB.  --Continue to monitor clinically, notify NSGY immediately with any changes in neuro status.    Discussed with Dr. Templeton.    Dispo: Anticipate rehab.

## 2022-03-08 NOTE — PLAN OF CARE
Problem: Adult Inpatient Plan of Care  Goal: Plan of Care Review  Outcome: Ongoing, Progressing  Goal: Patient-Specific Goal (Individualized)  Outcome: Ongoing, Progressing  Goal: Absence of Hospital-Acquired Illness or Injury  Outcome: Ongoing, Progressing  Goal: Optimal Comfort and Wellbeing  Outcome: Ongoing, Progressing  Goal: Readiness for Transition of Care  Outcome: Ongoing, Progressing     Problem: Bariatric Environmental Safety  Goal: Safety Maintained with Care  Outcome: Ongoing, Progressing     Problem: Fall Injury Risk  Goal: Absence of Fall and Fall-Related Injury  Outcome: Ongoing, Progressing     Problem: Skin Injury Risk Increased  Goal: Skin Health and Integrity  Outcome: Ongoing, Progressing     Problem: Infection  Goal: Absence of Infection Signs and Symptoms  Outcome: Ongoing, Progressing     Problem: Impaired Wound Healing  Goal: Optimal Wound Healing  Outcome: Ongoing, Progressing   Patient alert and oriented x4, no distress noted, pt complain of 9/10 pain during this shift, respiratory status intact, pt on room air, Pt found in the shower w/o asking for assistance crying due to over exertion, hemovac drains in place to left and right upper back, left side draining more than right 225 o/p, staples in place to mid back down the buttocks CDI, no redness or edema noted. Educated pt to importance of using the call light system with any wants or needs, updated pt to POC, pt verbalize understanding, will continue to monitor throughout shift.

## 2022-03-08 NOTE — PROGRESS NOTES
[facility] ([facility])  Neurosurgery  Progress Note    Subjective:     History of Present Illness: Rachel Zazueta is a 41 y.o.  female with PMHx of Hepatitis C, liver cirrhosis, pancytopenia, and polysubstance abuse, who presents to clinic for follow up with new imaging s/p L3 and L4 laminectomy for evacuation of epidural abscess and L3-L5 TLIF on 04/16/2021.     The pt c/o worsening back and left leg pain since October. She states that she is no longer using IV drugs. States only using marijuana. Describes the left leg pain as a shock down the leg when standing for 10 minutes. Denies taking any antibiotics. Endorses new weakness in the legs. Denies b/b dysfunction. Denies new neck pain. She ambulates with a walker at home. States that she smoke.    She presents to ED as direct admit from clinic.       Post-Op Info:  Procedure(s) (LRB):  FUSION, SPINE, LUMBAR (Bilateral)   6 Days Post-Op     Interval History: NAEON. AFVSS. Platelets 60. Will transfuse today. Pain controlled. Voiding spontaneously. Up in chair resting comfortably.      Medications:  Continuous Infusions:   sodium chloride 0.9% Stopped (03/03/22 1708)     Scheduled Meds:   acetaminophen  1,000 mg Oral Q6H    cefTRIAXone (ROCEPHIN) IVPB  2 g Intravenous Q12H    LIDOcaine  1 patch Transdermal Q24H    miconazole NITRATE 2 %   Topical (Top) BID    pantoprazole  40 mg Oral Daily    pregabalin  75 mg Oral TID    senna-docusate 8.6-50 mg  1 tablet Oral Daily    sertraline  50 mg Oral Daily    sodium chloride 0.9%  10 mL Intravenous Q6H     PRN Meds:sodium chloride, aluminum-magnesium hydroxide-simethicone, dextrose 10%, dextrose 10%, glucagon (human recombinant), glucose, glucose, HYDROmorphone, melatonin, oxyCODONE, oxyCODONE, polyethylene glycol, Flushing PICC Protocol **AND** sodium chloride 0.9% **AND** sodium chloride 0.9%     Review of Systems  Objective:     Weight: 133.8 kg (294 lb 15.6 oz)  Body mass index is 46.2  kg/m².  Vital Signs (Most Recent):  Temp: 98 °F (36.7 °C) (03/08/22 1300)  Pulse: (!) 113 (03/08/22 1300)  Resp: 15 (03/08/22 1409)  BP: 125/65 (03/08/22 1300)  SpO2: 97 % (03/08/22 1300)   Vital Signs (24h Range):  Temp:  [96.7 °F (35.9 °C)-98.5 °F (36.9 °C)] 98 °F (36.7 °C)  Pulse:  [108-115] 113  Resp:  [15-18] 15  SpO2:  [95 %-99 %] 97 %  BP: ()/(53-80) 125/65     Date 03/08/22 0700 - 03/09/22 0659   Shift 5963-0016 8213-0738 7382-2339 24 Hour Total   INTAKE   Blood 264   264   Shift Total(mL/kg) 264(2)   264(2)   OUTPUT   Drains 85   85   Shift Total(mL/kg) 85(0.6)   85(0.6)   Weight (kg) 133.8 133.8 133.8 133.8                        Closed/Suction Drain 03/02/22 1208 Left;Superior Back Accordion 10 Fr. (Active)   $ Drain Supplies Drainage Catheter 03/08/22 0800   Site Description Healing 03/08/22 0600   Dressing Type Other (Comment) 03/08/22 0600   Dressing Status Clean;Dry;Intact 03/08/22 0600   Dressing Intervention Integrity maintained 03/08/22 0600   Drainage Sanguineous 03/06/22 1930   Status Open to gravity drainage 03/08/22 1200   Output (mL) 45 mL 03/08/22 1208            Closed/Suction Drain 03/02/22 1208 Right;Superior Back Accordion 10 Fr. (Active)   $ Drain Supplies Drainage Catheter 03/08/22 0800   Site Description Healing 03/05/22 1100   Dressing Type Transparent (Tegaderm) 03/06/22 1930   Dressing Status Clean;Dry;Intact 03/08/22 0600   Dressing Intervention Integrity maintained 03/08/22 0600   Drainage Sanguineous 03/05/22 1100   Status Open to gravity drainage 03/08/22 1200   Output (mL) 0 mL 03/08/22 1209   Neurosurgery Physical Exam  General: Well developed, well nourished, not in acute distress.   Head: Normocephalic, atraumatic.  Neck: Full ROM.   Neurologic: Alert and oriented x 4. Thought content appropriate.  GCS: Motor:6  Verbal:5  Eyes:4   GCS Total: 15  Language: No aphasia.  Speech: No dysarthria.  Cranial nerves: Face symmetric, tongue midline, CN II-XII grossly intact.    Eyes: Pupils equal, round, reactive to light with accomodation, EOMI.   Pulmonary: Normal respirations, no signs of respiratory distress.  Abdomen: Soft, non-distended, non-tender to palpation.  Sensory: Intact to light touch throughout.  Motor Strength: Moves all extremities spontaneously with good tone. No abnormal movements seen.      Strength   Deltoids Triceps Biceps Wrist Extension Wrist Flexion Hand    Upper: R 5/5 5/5 5/5 5/5 5/5 5/5     L 5/5 5/5 5/5 5/5 5/5 5/5       Hip Flexion Knee Extension Knee  Flexion Dorsiflexion Plantar flexion EHL   Lower: R 5/5 5/5 5/5 5/5 5/5 5/5     L 4/5 4/5 4/5 5/5 5/5 5/5      Parker: Absent.  Clonus: 3 beats bilaterally.  Skin: Skin is warm, dry and intact.     Posterior lumbar incision c/d/i with staples. No surrounding erythema or edema. No drainage from incision. No palpable hematoma or underlying fluid collection.     HV drains in place to gravity.   Significant Labs:  Recent Labs   Lab 03/07/22  0351 03/08/22  0554   GLU  --  83   NA  --  139   K  --  3.9   CL  --  106   CO2  --  26   BUN  --  18   CREATININE  --  0.5   CALCIUM  --  8.0*   MG 1.6 1.7     Recent Labs   Lab 03/07/22  0351 03/08/22  0554   WBC 3.31* 2.28*   HGB 8.9* 7.7*   HCT 28.3* 24.5*   * 60*     Recent Labs   Lab 03/07/22  0351 03/08/22  0554   INR 1.4* 1.4*     Microbiology Results (last 7 days)       Procedure Component Value Units Date/Time    Culture, Anaerobe [[id number]] Collected: 03/02/22 1228    Order Status: Completed Specimen: Back Updated: 03/08/22 1037     Anaerobic Culture Culture in progress    Narrative:      L5/S1 DISC    Aerobic culture [[id number]] Collected: 03/02/22 1228    Order Status: Completed Specimen: Back Updated: 03/05/22 1108     Aerobic Bacterial Culture No growth    Narrative:      L5/S1 DISC    AFB Culture & Smear [[id number]] Collected: 03/02/22 1228    Order Status: Completed Specimen: Back Updated: 03/03/22 2127     AFB Culture & Smear Culture in  progress     AFB CULTURE STAIN No acid fast bacilli seen.    Narrative:      L5/S1 DISC    Fungus culture [[id number]] Collected: 02/21/22 1539    Order Status: Completed Specimen: Abscess from Back Updated: 03/02/22 1435     Fungus (Mycology) Culture Culture in progress    Narrative:      Epidural purulence #1    Fungus culture [[id number]] Collected: 02/21/22 1539    Order Status: Completed Specimen: Abscess from Back Updated: 03/02/22 1435     Fungus (Mycology) Culture Culture in progress    Narrative:      Epidural purulence #2    Gram stain [[id number]] Collected: 03/02/22 1228    Order Status: Completed Specimen: Back Updated: 03/02/22 1423     Gram Stain Result No WBC's      No organisms seen    Narrative:      L5/S1 DISC    Fungus culture [[id number]] Collected: 03/02/22 1228    Order Status: Sent Specimen: Back Updated: 03/02/22 1251    Fungus culture [[id number]] Collected: 02/15/22 1128    Order Status: Completed Specimen: Biopsy from Back Updated: 03/02/22 1028     Fungus (Mycology) Culture Culture in progress      No fungus isolated after 2 weeks    Narrative:      L3-4 / L4-5 osteodiscitis          All pertinent labs from the last 24 hours have been reviewed.    Significant Diagnostics:  I have reviewed all pertinent imaging results/findings within the past 24 hours.    Assessment/Plan:     * Spondylodiscitis  Rachel Zazueta is a 41 F with PMH hepatitis C, cirrhosis, pancytopenia, nicotine abuse, hx IVDU (last use 2 years ago), strep agalactae bacteremia, s/p L3-5 TLIF on 4/16/2021 who presents with persistent and progressive lumbar spondylodiscitis with resultant hardware failure and new scoliosis.     S/p hardware removal on 2/21. Now s/p T10 t pelvis posterior instrumented fusion with L5-S1 TLIF on 3/2.     Plan:  --Patient is floor status.       -q4h neurochecks.  --Post op scoliosis obtained with good hardware placement.  --All labs and diagnostics personally reviewed.  --Follow-up MRI Brain, C/T/L  w/wo contrast:   -No evidence of osteomyelitis, discitis, or epidural abscess within the cervical or thoracic spine   -No diffusion positive or abnormal enhancement within the brain   -MRI brain shows patchy nonspecific increased T2 and FLAIR signal in the periventricular and subcortical white matter bilaterally. Could be due to chronic microvascular ischemic changes vs demyelinating disease given patient's age. No evidence of active demyelination.   -DDD C4-7 with mild to moderate canal stenosis  --Full infectious w/u.   -Inflammatory markers wnl. BCx 2/14 NGTD, UA/Ucx pending. Wound Cx from 2/21 neg. Wound Cx from 3/2 pending.    -F/u ID recs - now on Rocephin x 8 weeks. PICC placed.    -Will need placement for IV abx therapy given h/o of IVDU.  --S/p L3-4 disc biopsy and L3 bone biopsy with IR 2/15 - Gram stain rare WBC. Cx NGTD.  --Utox + for amphetamines, addiction psych consulted per .   --Hold anti-plt/coag medications.  --Monitor for urinary retention - voiding spontaneously. Bladder scan prn.  --Pain control: Oxy 10 q4h PRN for moderate pain, Oxy 20 q4h PRN for severe pain, Dilaudid 1 mg q4h PRN when pain not relieved by PO meds. Continue lyrica 50 mg TID.  --Drains: to gravity. Continue IV abx.  --Plt stable this am; goal > 80k  --H&H stable this am.  --DVT ppx: TEDs/SCDs  --Bowel regimen: Miralax, senna. + BM.  --Activity: PT/OT OOB, TLSO brace when OOB.  --Continue to monitor clinically, notify NSGY immediately with any changes in neuro status.    Discussed with Dr. Templeton.    Dispo: Anticipate rehab.        Donna Davalos PA-C  Neurosurgery  [facility] - [facility] ([facility])

## 2022-03-08 NOTE — NURSING
Outcome: Ongoing, Progressing   Patient alert and oriented x4, no distress noted, pt complain of 9/10 pain during this shift, respiratory status intact, pt on room air, Pt found in the shower w/o asking for assistance crying due to over exertion, hemovac drains in place to left and right upper back, left side draining more than right 225 o/p, staples in place to mid back down the buttocks CDI, no redness or edema noted. Educated pt to importance of using the call light system with any wants or needs, updated pt to POC, pt verbalize understanding, will continue to monitor throughout shift.

## 2022-03-08 NOTE — PLAN OF CARE
"Ms. Zazueta is 42 y/o F with PMH of cirrhosis, Hep C, pancytopenia, polysubstance abuse, and epidural abscess s/p L3-L4 laminectomy and L3-L5 TLIF (4/2021; blood and surgical cultures positive for group B strep) admitted from [facility] with progressive lumbar spondylodiscitis and hardware failure. IM6 consulted for preop risk stratification and medical optimization for liver cirrhosis.      MELD-Na score: 12 at 3/8/2022  5:54 AM  MELD score: 12 at 3/8/2022  5:54 AM  Calculated from:  Serum Creatinine: 0.5 mg/dL (Using min of 1 mg/dL) at 3/8/2022  5:54 AM  Serum Sodium: 139 mmol/L (Using max of 137 mmol/L) at 3/8/2022  5:54 AM  Total Bilirubin: 1.8 mg/dL at 3/8/2022  5:54 AM  INR(ratio): 1.4 at 3/8/2022  5:54 AM  Age: 41 years     - MELD score continue to raise, will need to reassess for decompensation of cirrhosis if continue to raise tomorrow   - continue rocephin per ID recs, PICC placed   - daily CMP, INR   - will need outpatient hepatology referral    - recommend placement on discharge for continued IV abx given hx of drug use and noncompliance with previous IV abx      Please secure chat [facility] Medicine  ("Medicine Consult Only") or contact me directly for any additional questions or concerns        Simi Argueta MD  Internal Medicine, PGYIII  "

## 2022-03-08 NOTE — PT/OT/SLP PROGRESS
Physical Therapy Treatment    Patient Name:  Rachel Zazueta   MRN:  [id number]    Recommendations:     Discharge Recommendations:  rehabilitation facility   Discharge Equipment Recommendations:  (TBD)   Barriers to discharge: Inaccessible home and impaired functional mobility requiring increased assistance    Assessment:     Rachel Zazueta is a 41 y.o. female admitted with a medical diagnosis of Spondylodiscitis.  She presents with the following impairments/functional limitations:  weakness, impaired endurance, impaired self care skills, impaired functional mobilty, gait instability, impaired balance, decreased lower extremity function, decreased safety awareness, pain, decreased ROM, orthopedic precautions. Pt tolerated session well with focus on bed mobility, transfers, and gait training. Pt progressing well with improvement in assistance needed with most of pts functional mobility, including increased gait endurance. Pt remains most limited by pain and instability during gait. Pt requires education on need for assistive device usage to decrease compensatory strategies and improve stability during gait. Pt remains unable to care for self in an independent living environment at current functional level with limited support and her home remains inaccessible d/t 5 stairs to enter; she remains an excellent candidate for inpatient rehabilitation. Will plan to begin addressing stair training d/t improvements in gait endurance. Pt will continue to benefit from therapy services to address impairments listed above.     Rehab Prognosis: Good; patient would benefit from acute skilled PT services to address these deficits and reach maximum level of function.    Recent Surgery: Procedure(s) (LRB):  FUSION, SPINE, LUMBAR (Bilateral) 6 Days Post-Op    Plan:     During this hospitalization, patient to be seen 3 x/week to address the identified rehab impairments via gait training, therapeutic activities, therapeutic  "exercises, neuromuscular re-education and progress toward the following goals:    · Plan of Care Expires:  03/24/22    Subjective     Chief Complaint: pain  Patient/Family Comments/goals: "This is bearable for me. And this pain is surgical pain, it is very different than what I was dealing with before surgery."  Pain/Comfort:  · Pain Rating 1: 8/10  · Location - Orientation 1: generalized  · Location 1: back  · Pain Addressed 1: Reposition, Distraction  · Pain Rating Post-Intervention 1: 8/10      Objective:     Communicated with RN prior to session.  Patient found HOB minimally elevated with telemetry, hemovac, wound vac upon PTA entry to room.     General Precautions: Standard, fall   Orthopedic Precautions:spinal precautions   Braces: TLSO  Respiratory Status: Room air     Functional Mobility:  · Bed Mobility:     · Rolling Left:  modified independence  · Rolling Right: modified independence  · Supine to Sit: supervision  · Transfers:     · Sit to Stand:  contact guard assistance with no AD  · Bed to Chair: minimum assistance with  hand-held assist  using  Step Transfer  · Gait: Pt ambulates ~168 ft with RW for ~70 ft followed by HHA for remainder of trial. Pt throughout requires Min A for steadying assist d/t impaired balance with excessive lateral sway. Mild waddling gait which pt attributes to previous compensatory strategies. Pt requested attempted gait with no AD but is educated following trial on her continued need for AD for gait stability.       AM-PAC 6 CLICK MOBILITY  Turning over in bed (including adjusting bedclothes, sheets and blankets)?: 4  Sitting down on and standing up from a chair with arms (e.g., wheelchair, bedside commode, etc.): 3  Moving from lying on back to sitting on the side of the bed?: 3  Moving to and from a bed to a chair (including a wheelchair)?: 3  Need to walk in hospital room?: 3  Climbing 3-5 steps with a railing?: 2  Basic Mobility Total Score: 18       Therapeutic " Activities and Exercises:   Pt assisted with functional mobility as noted above.   Pt educated on need and importance of use of AD/RW for gait to improve stability and safety.   Pt educated on spinal precautions and TLSO fit - donning/doffing.   TLSO donned in sitting at EOB.   Increased time for all functional mobility for decreased physical assistance.   Sit<>stand x 5 trials with/without RW and CGA. Pt slow to transition to standing. Cues and focus on improved forward weight shift and improve pacing of transition. Decreased reliance on BUE support.     Patient left up in chair with all lines intact, call button in reach and RN notified.    GOALS:   Multidisciplinary Problems     Physical Therapy Goals        Problem: Physical Therapy Goal    Goal Priority Disciplines Outcome Goal Variances Interventions   Physical Therapy Goal     PT, PT/OT Ongoing, Progressing     Description: Goals to be met by: 3/18/2022     Patient will increase functional independence with mobility by performin. Supine to sit with Supervision  2. Sit to supine with Supervision  3. Rolling to Left and Right with Supervision  4. Sit to stand transfer with Stand-by Assistance and RW  5. Bed to chair transfer with Minimal Assistance using Rolling Walker                     Time Tracking:     PT Received On: 22  PT Start Time: 943     PT Stop Time: 1029  PT Total Time (min): 46 min     Billable Minutes: Gait Training 16 and Therapeutic Activity 30    Treatment Type: Treatment  PT/PTA: PTA     PTA Visit Number: 1     2022

## 2022-03-09 LAB
ALBUMIN SERPL BCP-MCNC: 2.6 G/DL (ref 3.5–5.2)
ALP SERPL-CCNC: 103 U/L (ref 55–135)
ALT SERPL W/O P-5'-P-CCNC: 27 U/L (ref 10–44)
ANION GAP SERPL CALC-SCNC: 6 MMOL/L (ref 8–16)
AST SERPL-CCNC: 51 U/L (ref 10–40)
BACTERIA SPEC ANAEROBE CULT: NORMAL
BASOPHILS # BLD AUTO: 0.02 K/UL (ref 0–0.2)
BASOPHILS NFR BLD: 0.6 % (ref 0–1.9)
BILIRUB SERPL-MCNC: 1.8 MG/DL (ref 0.1–1)
BUN SERPL-MCNC: 18 MG/DL (ref 6–20)
CALCIUM SERPL-MCNC: 8.4 MG/DL (ref 8.7–10.5)
CHLORIDE SERPL-SCNC: 105 MMOL/L (ref 95–110)
CO2 SERPL-SCNC: 26 MMOL/L (ref 23–29)
CREAT SERPL-MCNC: 0.6 MG/DL (ref 0.5–1.4)
DIFFERENTIAL METHOD: ABNORMAL
EOSINOPHIL # BLD AUTO: 0.2 K/UL (ref 0–0.5)
EOSINOPHIL NFR BLD: 5.3 % (ref 0–8)
ERYTHROCYTE [DISTWIDTH] IN BLOOD BY AUTOMATED COUNT: 16.8 % (ref 11.5–14.5)
EST. GFR  (AFRICAN AMERICAN): >60 ML/MIN/1.73 M^2
EST. GFR  (NON AFRICAN AMERICAN): >60 ML/MIN/1.73 M^2
FINAL PATHOLOGIC DIAGNOSIS: NORMAL
GLUCOSE SERPL-MCNC: 90 MG/DL (ref 70–110)
GROSS: NORMAL
HCT VFR BLD AUTO: 29.6 % (ref 37–48.5)
HGB BLD-MCNC: 9.2 G/DL (ref 12–16)
IMM GRANULOCYTES # BLD AUTO: 0.01 K/UL (ref 0–0.04)
IMM GRANULOCYTES NFR BLD AUTO: 0.3 % (ref 0–0.5)
INR PPP: 1.2 (ref 0.8–1.2)
LYMPHOCYTES # BLD AUTO: 0.5 K/UL (ref 1–4.8)
LYMPHOCYTES NFR BLD: 17 % (ref 18–48)
Lab: NORMAL
MCH RBC QN AUTO: 29.3 PG (ref 27–31)
MCHC RBC AUTO-ENTMCNC: 31.1 G/DL (ref 32–36)
MCV RBC AUTO: 94 FL (ref 82–98)
MICROSCOPIC EXAM: NORMAL
MONOCYTES # BLD AUTO: 0.4 K/UL (ref 0.3–1)
MONOCYTES NFR BLD: 11 % (ref 4–15)
NEUTROPHILS # BLD AUTO: 2.1 K/UL (ref 1.8–7.7)
NEUTROPHILS NFR BLD: 65.8 % (ref 38–73)
NRBC BLD-RTO: 0 /100 WBC
PLATELET # BLD AUTO: 107 K/UL (ref 150–450)
PMV BLD AUTO: 10.2 FL (ref 9.2–12.9)
POTASSIUM SERPL-SCNC: 4 MMOL/L (ref 3.5–5.1)
PROT SERPL-MCNC: 6.3 G/DL (ref 6–8.4)
PROTHROMBIN TIME: 12.5 SEC (ref 9–12.5)
RBC # BLD AUTO: 3.14 M/UL (ref 4–5.4)
SODIUM SERPL-SCNC: 137 MMOL/L (ref 136–145)
WBC # BLD AUTO: 3.18 K/UL (ref 3.9–12.7)

## 2022-03-09 PROCEDURE — 85025 COMPLETE CBC W/AUTO DIFF WBC: CPT | Performed by: STUDENT IN AN ORGANIZED HEALTH CARE EDUCATION/TRAINING PROGRAM

## 2022-03-09 PROCEDURE — 25000003 PHARM REV CODE 250: Performed by: NEUROLOGICAL SURGERY

## 2022-03-09 PROCEDURE — 99024 PR POST-OP FOLLOW-UP VISIT: ICD-10-PCS | Mod: ,,,

## 2022-03-09 PROCEDURE — 25000003 PHARM REV CODE 250

## 2022-03-09 PROCEDURE — C1751 CATH, INF, PER/CENT/MIDLINE: HCPCS

## 2022-03-09 PROCEDURE — 63600175 PHARM REV CODE 636 W HCPCS: Performed by: PHYSICIAN ASSISTANT

## 2022-03-09 PROCEDURE — 80053 COMPREHEN METABOLIC PANEL: CPT

## 2022-03-09 PROCEDURE — 25000003 PHARM REV CODE 250: Performed by: STUDENT IN AN ORGANIZED HEALTH CARE EDUCATION/TRAINING PROGRAM

## 2022-03-09 PROCEDURE — A4216 STERILE WATER/SALINE, 10 ML: HCPCS | Performed by: NEUROLOGICAL SURGERY

## 2022-03-09 PROCEDURE — 85610 PROTHROMBIN TIME: CPT

## 2022-03-09 PROCEDURE — 99024 POSTOP FOLLOW-UP VISIT: CPT | Mod: ,,,

## 2022-03-09 PROCEDURE — 11000001 HC ACUTE MED/SURG PRIVATE ROOM

## 2022-03-09 PROCEDURE — 63600175 PHARM REV CODE 636 W HCPCS

## 2022-03-09 RX ORDER — HYDROMORPHONE HYDROCHLORIDE 1 MG/ML
1 INJECTION, SOLUTION INTRAMUSCULAR; INTRAVENOUS; SUBCUTANEOUS EVERY 4 HOURS PRN
Status: DISCONTINUED | OUTPATIENT
Start: 2022-03-09 | End: 2022-03-15 | Stop reason: HOSPADM

## 2022-03-09 RX ORDER — HEPARIN SODIUM 5000 [USP'U]/ML
5000 INJECTION, SOLUTION INTRAVENOUS; SUBCUTANEOUS EVERY 8 HOURS
Status: DISCONTINUED | OUTPATIENT
Start: 2022-03-09 | End: 2022-03-14

## 2022-03-09 RX ORDER — FUROSEMIDE 10 MG/ML
40 INJECTION INTRAMUSCULAR; INTRAVENOUS ONCE
Status: COMPLETED | OUTPATIENT
Start: 2022-03-09 | End: 2022-03-09

## 2022-03-09 RX ADMIN — ACETAMINOPHEN 1000 MG: 325 TABLET ORAL at 01:03

## 2022-03-09 RX ADMIN — CEFTRIAXONE 2 G: 2 INJECTION, POWDER, FOR SOLUTION INTRAMUSCULAR; INTRAVENOUS at 01:03

## 2022-03-09 RX ADMIN — LIDOCAINE 1 PATCH: 50 PATCH CUTANEOUS at 06:03

## 2022-03-09 RX ADMIN — FUROSEMIDE 40 MG: 10 INJECTION, SOLUTION INTRAVENOUS at 02:03

## 2022-03-09 RX ADMIN — Medication 10 ML: at 12:03

## 2022-03-09 RX ADMIN — PREGABALIN 75 MG: 75 CAPSULE ORAL at 08:03

## 2022-03-09 RX ADMIN — ACETAMINOPHEN 1000 MG: 325 TABLET ORAL at 06:03

## 2022-03-09 RX ADMIN — HEPARIN SODIUM 5000 UNITS: 5000 INJECTION INTRAVENOUS; SUBCUTANEOUS at 02:03

## 2022-03-09 RX ADMIN — HEPARIN SODIUM 5000 UNITS: 5000 INJECTION INTRAVENOUS; SUBCUTANEOUS at 09:03

## 2022-03-09 RX ADMIN — OXYCODONE HYDROCHLORIDE 20 MG: 10 TABLET ORAL at 08:03

## 2022-03-09 RX ADMIN — PREGABALIN 75 MG: 75 CAPSULE ORAL at 09:03

## 2022-03-09 RX ADMIN — SENNOSIDES AND DOCUSATE SODIUM 1 TABLET: 50; 8.6 TABLET ORAL at 08:03

## 2022-03-09 RX ADMIN — MICONAZOLE NITRATE 2 % TOPICAL POWDER: at 09:03

## 2022-03-09 RX ADMIN — ACETAMINOPHEN 1000 MG: 325 TABLET ORAL at 05:03

## 2022-03-09 RX ADMIN — CEFTRIAXONE 2 G: 2 INJECTION, POWDER, FOR SOLUTION INTRAMUSCULAR; INTRAVENOUS at 02:03

## 2022-03-09 RX ADMIN — Medication 10 ML: at 05:03

## 2022-03-09 RX ADMIN — OXYCODONE HYDROCHLORIDE 20 MG: 10 TABLET ORAL at 06:03

## 2022-03-09 RX ADMIN — OXYCODONE HYDROCHLORIDE 20 MG: 10 TABLET ORAL at 01:03

## 2022-03-09 RX ADMIN — ACETAMINOPHEN 1000 MG: 325 TABLET ORAL at 12:03

## 2022-03-09 RX ADMIN — MICONAZOLE NITRATE 2 % TOPICAL POWDER: at 08:03

## 2022-03-09 RX ADMIN — PANTOPRAZOLE SODIUM 40 MG: 20 TABLET, DELAYED RELEASE ORAL at 08:03

## 2022-03-09 RX ADMIN — HYDROMORPHONE HYDROCHLORIDE 1 MG: 1 INJECTION, SOLUTION INTRAMUSCULAR; INTRAVENOUS; SUBCUTANEOUS at 09:03

## 2022-03-09 RX ADMIN — SERTRALINE HYDROCHLORIDE 50 MG: 50 TABLET ORAL at 08:03

## 2022-03-09 RX ADMIN — Medication 10 ML: at 06:03

## 2022-03-09 RX ADMIN — PREGABALIN 75 MG: 75 CAPSULE ORAL at 02:03

## 2022-03-09 NOTE — ASSESSMENT & PLAN NOTE
Rachel Zazueta is a 41 F with PMH hepatitis C, cirrhosis, pancytopenia, nicotine abuse, hx IVDU (last use 2 years ago), strep agalactae bacteremia, s/p L3-5 TLIF on 4/16/2021 who presents with persistent and progressive lumbar spondylodiscitis with resultant hardware failure and new scoliosis.     S/p hardware removal on 2/21. Now s/p T10 t pelvis posterior instrumented fusion with L5-S1 TLIF on 3/2.     Plan:  --Patient is floor status.       -q4h neurochecks.  --Post op scoliosis obtained with good hardware placement.  --All labs and diagnostics personally reviewed.  --Follow-up MRI Brain, C/T/L w/wo contrast:   -No evidence of osteomyelitis, discitis, or epidural abscess within the cervical or thoracic spine   -No diffusion positive or abnormal enhancement within the brain   -MRI brain shows patchy nonspecific increased T2 and FLAIR signal in the periventricular and subcortical white matter bilaterally. Could be due to chronic microvascular ischemic changes vs demyelinating disease given patient's age. No evidence of active demyelination.   -DDD C4-7 with mild to moderate canal stenosis  --Full infectious w/u.   -Inflammatory markers wnl. BCx 2/14 NGTD, UA/Ucx pending. Wound Cx from 2/21 neg. Wound Cx from 3/2 pending.    -F/u ID recs - now on Rocephin x 8 weeks. PICC placed.    -Will need placement for IV abx therapy given h/o of IVDU.  --S/p L3-4 disc biopsy and L3 bone biopsy with IR 2/15 - Gram stain rare WBC. Cx NGTD.  --Utox + for amphetamines, addiction psych consulted per .   --Hold anti-plt/coag medications.  --Monitor for urinary retention - voiding spontaneously. Bladder scan prn.  --Pain control: Oxy 10 q4h PRN for moderate pain, Oxy 20 q4h PRN for severe pain, Dilaudid 1 mg q4h PRN when pain not relieved by PO meds wean to off. Continue lyrica 50 mg TID.  --Drains: to gravity. Continue IV abx.  --Plt stable this am; goal > 80k  --H&H stable this am and uptrending.  --DVT ppx: TEDs/SCDs/SQH. BLE  US as DVT r/o maintenance.   --Bowel regimen: Miralax, senna. + BM.  --Activity: PT/OT OOB, TLSO brace when OOB.  --Continue to monitor clinically, notify NSGY immediately with any changes in neuro status.    Discussed with Dr. Templeton.    Dispo: Anticipate rehab pending drain removal.

## 2022-03-09 NOTE — PROGRESS NOTES
[facility] - Neurosurgery ([facility])  Neurosurgery  Progress Note    Subjective:     History of Present Illness: Rachel Zazueta is a 41 y.o.  female with PMHx of Hepatitis C, liver cirrhosis, pancytopenia, and polysubstance abuse, who presents to clinic for follow up with new imaging s/p L3 and L4 laminectomy for evacuation of epidural abscess and L3-L5 TLIF on 04/16/2021.     The pt c/o worsening back and left leg pain since October. She states that she is no longer using IV drugs. States only using marijuana. Describes the left leg pain as a shock down the leg when standing for 10 minutes. Denies taking any antibiotics. Endorses new weakness in the legs. Denies b/b dysfunction. Denies new neck pain. She ambulates with a walker at home. States that she smoke.    She presents to ED as direct admit from clinic.       Post-Op Info:  Procedure(s) (LRB):  FUSION, SPINE, LUMBAR (Bilateral)   7 Days Post-Op     Interval History: NAEON. AFVSS. Platelets 107. H&H uptrending. Pain well controlled. Neuro exam stable. Voiding spontaneously, +BM. Up in chair and working well with PT/OT. HV drains may be removed today pending output. US BLE to r/o DVT given patient has been in hospital for a while.    Medications:  Continuous Infusions:   sodium chloride 0.9% Stopped (03/03/22 1708)     Scheduled Meds:   acetaminophen  1,000 mg Oral Q6H    cefTRIAXone (ROCEPHIN) IVPB  2 g Intravenous Q12H    LIDOcaine  1 patch Transdermal Q24H    miconazole NITRATE 2 %   Topical (Top) BID    pantoprazole  40 mg Oral Daily    pregabalin  75 mg Oral TID    senna-docusate 8.6-50 mg  1 tablet Oral Daily    sertraline  50 mg Oral Daily    sodium chloride 0.9%  10 mL Intravenous Q6H     PRN Meds:sodium chloride, aluminum-magnesium hydroxide-simethicone, dextrose 10%, dextrose 10%, glucagon (human recombinant), glucose, glucose, HYDROmorphone, melatonin, oxyCODONE, oxyCODONE, polyethylene glycol, Flushing PICC Protocol **AND** sodium  chloride 0.9% **AND** sodium chloride 0.9%       Objective:     Weight: 133.8 kg (294 lb 15.6 oz)  Body mass index is 46.2 kg/m².  Vital Signs (Most Recent):  Temp: 98 °F (36.7 °C) (03/09/22 0748)  Pulse: 97 (03/09/22 0748)  Resp: 20 (03/09/22 0841)  BP: (!) 141/64 (03/09/22 0748)  SpO2: 100 % (03/09/22 0748)   Vital Signs (24h Range):  Temp:  [97.3 °F (36.3 °C)-98.6 °F (37 °C)] 98 °F (36.7 °C)  Pulse:  [] 97  Resp:  [14-20] 20  SpO2:  [95 %-100 %] 100 %  BP: (104-141)/(53-77) 141/64                          Closed/Suction Drain 03/02/22 1208 Left;Superior Back Accordion 10 Fr. (Active)   $ Drain Supplies Drainage Catheter 03/08/22 2000   Site Description Healing 03/09/22 0600   Dressing Type Other (Comment) 03/09/22 0600   Dressing Status Clean;Dry;Intact 03/09/22 0600   Dressing Intervention Integrity maintained 03/09/22 0600   Drainage Sanguineous 03/06/22 1930   Status Open to gravity drainage 03/09/22 0600   Output (mL) 45 mL 03/08/22 1208            Closed/Suction Drain 03/02/22 1208 Right;Superior Back Accordion 10 Fr. (Active)   $ Drain Supplies Drainage Catheter 03/08/22 2000   Site Description Healing 03/09/22 0600   Dressing Type Transparent (Tegaderm) 03/06/22 1930   Dressing Status Clean;Dry;Intact 03/09/22 0600   Dressing Intervention Integrity maintained 03/09/22 0600   Drainage Sanguineous 03/05/22 1100   Status Open to gravity drainage 03/08/22 1200   Output (mL) 50 mL 03/08/22 1931     Neurosurgery Physical Exam  General: Well developed, well nourished, not in acute distress.   Head: Normocephalic, atraumatic.  Neck: Full ROM.   Neurologic: Alert and oriented x 4. Thought content appropriate.  GCS: Motor:6  Verbal:5  Eyes:4   GCS Total: 15  Language: No aphasia.  Speech: No dysarthria.  Cranial nerves: Face symmetric, tongue midline, CN II-XII grossly intact.   Eyes: Pupils equal, round, reactive to light with accomodation, EOMI.   Pulmonary: Normal respirations, no signs of respiratory  distress.  Abdomen: Soft, non-distended, non-tender to palpation.  Sensory: Intact to light touch throughout.  Motor Strength: Moves all extremities spontaneously with good tone. No abnormal movements seen.      Strength   Deltoids Triceps Biceps Wrist Extension Wrist Flexion Hand    Upper: R 5/5 5/5 5/5 5/5 5/5 5/5     L 5/5 5/5 5/5 5/5 5/5 5/5       Hip Flexion Knee Extension Knee  Flexion Dorsiflexion Plantar flexion EHL   Lower: R 5/5 5/5 5/5 5/5 5/5 5/5     L 5/5 5/5 5/5 5/5 5/5 5/5      Parker: Absent.  Clonus: 3 beats bilaterally.  Skin: Skin is warm, dry and intact.     Posterior lumbar incision c/d/i with staples. No surrounding erythema or edema. No drainage from incision. No palpable hematoma or underlying fluid collection.     HV drains in place to gravity.     Significant Labs:  Recent Labs   Lab 03/08/22  0554 03/09/22  0529   GLU 83 90    137   K 3.9 4.0    105   CO2 26 26   BUN 18 18   CREATININE 0.5 0.6   CALCIUM 8.0* 8.4*   MG 1.7  --      Recent Labs   Lab 03/08/22  0554 03/09/22  0529   WBC 2.28* 3.18*   HGB 7.7* 9.2*   HCT 24.5* 29.6*   PLT 60* 107*     Recent Labs   Lab 03/08/22  0554 03/09/22  0529   INR 1.4* 1.2     Microbiology Results (last 7 days)       Procedure Component Value Units Date/Time    Fungus culture [[id number]] Collected: 02/21/22 1539    Order Status: Completed Specimen: Abscess from Back Updated: 03/09/22 0911     Fungus (Mycology) Culture Culture in progress      No fungus isolated after 2 weeks    Narrative:      Epidural purulence #1    Fungus culture [[id number]] Collected: 02/21/22 1539    Order Status: Completed Specimen: Abscess from Back Updated: 03/09/22 0911     Fungus (Mycology) Culture Culture in progress      No fungus isolated after 2 weeks    Narrative:      Epidural purulence #2    Culture, Anaerobe [[id number]] Collected: 03/02/22 1228    Order Status: Completed Specimen: Back Updated: 03/09/22 7984     Anaerobic Culture No anaerobes isolated     Narrative:      L5/S1 DISC    Aerobic culture [[id number]] Collected: 03/02/22 1228    Order Status: Completed Specimen: Back Updated: 03/05/22 1108     Aerobic Bacterial Culture No growth    Narrative:      L5/S1 DISC    AFB Culture & Smear [[id number]] Collected: 03/02/22 1228    Order Status: Completed Specimen: Back Updated: 03/03/22 2127     AFB Culture & Smear Culture in progress     AFB CULTURE STAIN No acid fast bacilli seen.    Narrative:      L5/S1 DISC    Gram stain [[id number]] Collected: 03/02/22 1228    Order Status: Completed Specimen: Back Updated: 03/02/22 1423     Gram Stain Result No WBC's      No organisms seen    Narrative:      L5/S1 DISC    Fungus culture [[id number]] Collected: 03/02/22 1228    Order Status: Sent Specimen: Back Updated: 03/02/22 1251    Fungus culture [[id number]] Collected: 02/15/22 1128    Order Status: Completed Specimen: Biopsy from Back Updated: 03/02/22 1028     Fungus (Mycology) Culture Culture in progress      No fungus isolated after 2 weeks    Narrative:      L3-4 / L4-5 osteodiscitis          All pertinent labs from the last 24 hours have been reviewed.    Significant Diagnostics:  I have reviewed and interpreted all pertinent imaging results/findings within the past 24 hours.    Assessment/Plan:     * Spondylodiscitis  Rachel Zazueta is a 41 F with PMH hepatitis C, cirrhosis, pancytopenia, nicotine abuse, hx IVDU (last use 2 years ago), strep agalactae bacteremia, s/p L3-5 TLIF on 4/16/2021 who presents with persistent and progressive lumbar spondylodiscitis with resultant hardware failure and new scoliosis.     S/p hardware removal on 2/21. Now s/p T10 t pelvis posterior instrumented fusion with L5-S1 TLIF on 3/2.     Plan:  --Patient is floor status.       -q4h neurochecks.  --Post op scoliosis obtained with good hardware placement.  --All labs and diagnostics personally reviewed.  --Follow-up MRI Brain, C/T/L w/wo contrast:   -No evidence of osteomyelitis,  discitis, or epidural abscess within the cervical or thoracic spine   -No diffusion positive or abnormal enhancement within the brain   -MRI brain shows patchy nonspecific increased T2 and FLAIR signal in the periventricular and subcortical white matter bilaterally. Could be due to chronic microvascular ischemic changes vs demyelinating disease given patient's age. No evidence of active demyelination.   -DDD C4-7 with mild to moderate canal stenosis  --Full infectious w/u.   -Inflammatory markers wnl. BCx 2/14 NGTD, UA/Ucx pending. Wound Cx from 2/21 neg. Wound Cx from 3/2 pending.    -F/u ID recs - now on Rocephin x 8 weeks. PICC placed.    -Will need placement for IV abx therapy given h/o of IVDU.  --S/p L3-4 disc biopsy and L3 bone biopsy with IR 2/15 - Gram stain rare WBC. Cx NGTD.  --Utox + for amphetamines, addiction psych consulted per [facility].   --Hold anti-plt/coag medications.  --Monitor for urinary retention - voiding spontaneously. Bladder scan prn.  --Pain control: Oxy 10 q4h PRN for moderate pain, Oxy 20 q4h PRN for severe pain, Dilaudid 1 mg q4h PRN when pain not relieved by PO meds wean to off. Continue lyrica 50 mg TID.  --Drains: to gravity. Continue IV abx.  --Plt stable this am; goal > 80k  --H&H stable this am and uptrending.  --DVT ppx: TEDs/SCDs/SQH. BLE US as DVT r/o maintenance.   --Bowel regimen: Miralax, senna. + BM.  --Activity: PT/OT OOB, TLSO brace when OOB.  --Continue to monitor clinically, notify NSGY immediately with any changes in neuro status.    Discussed with Dr. Templeton.    Dispo: Anticipate rehab pending drain removal.         Afua Campbell PA-C  Neurosurgery  [facility] - Neurosurgery ([facility])

## 2022-03-09 NOTE — SUBJECTIVE & OBJECTIVE
Interval History: NAEON. AFVSS. Platelets 107. H&H uptrending. Pain well controlled. Neuro exam stable. Voiding spontaneously, +BM. Up in chair and working well with PT/OT. HV drains may be removed today pending output. US BLE to r/o DVT given patient has been in hospital for a while.    Medications:  Continuous Infusions:   sodium chloride 0.9% Stopped (03/03/22 1708)     Scheduled Meds:   acetaminophen  1,000 mg Oral Q6H    cefTRIAXone (ROCEPHIN) IVPB  2 g Intravenous Q12H    LIDOcaine  1 patch Transdermal Q24H    miconazole NITRATE 2 %   Topical (Top) BID    pantoprazole  40 mg Oral Daily    pregabalin  75 mg Oral TID    senna-docusate 8.6-50 mg  1 tablet Oral Daily    sertraline  50 mg Oral Daily    sodium chloride 0.9%  10 mL Intravenous Q6H     PRN Meds:sodium chloride, aluminum-magnesium hydroxide-simethicone, dextrose 10%, dextrose 10%, glucagon (human recombinant), glucose, glucose, HYDROmorphone, melatonin, oxyCODONE, oxyCODONE, polyethylene glycol, Flushing PICC Protocol **AND** sodium chloride 0.9% **AND** sodium chloride 0.9%       Objective:     Weight: 133.8 kg (294 lb 15.6 oz)  Body mass index is 46.2 kg/m².  Vital Signs (Most Recent):  Temp: 98 °F (36.7 °C) (03/09/22 0748)  Pulse: 97 (03/09/22 0748)  Resp: 20 (03/09/22 0841)  BP: (!) 141/64 (03/09/22 0748)  SpO2: 100 % (03/09/22 0748)   Vital Signs (24h Range):  Temp:  [97.3 °F (36.3 °C)-98.6 °F (37 °C)] 98 °F (36.7 °C)  Pulse:  [] 97  Resp:  [14-20] 20  SpO2:  [95 %-100 %] 100 %  BP: (104-141)/(53-77) 141/64                          Closed/Suction Drain 03/02/22 1208 Left;Superior Back Accordion 10 Fr. (Active)   $ Drain Supplies Drainage Catheter 03/08/22 2000   Site Description Healing 03/09/22 0600   Dressing Type Other (Comment) 03/09/22 0600   Dressing Status Clean;Dry;Intact 03/09/22 0600   Dressing Intervention Integrity maintained 03/09/22 0600   Drainage Sanguineous 03/06/22 1930   Status Open to gravity drainage 03/09/22 0600    Output (mL) 45 mL 03/08/22 1208            Closed/Suction Drain 03/02/22 1208 Right;Superior Back Accordion 10 Fr. (Active)   $ Drain Supplies Drainage Catheter 03/08/22 2000   Site Description Healing 03/09/22 0600   Dressing Type Transparent (Tegaderm) 03/06/22 1930   Dressing Status Clean;Dry;Intact 03/09/22 0600   Dressing Intervention Integrity maintained 03/09/22 0600   Drainage Sanguineous 03/05/22 1100   Status Open to gravity drainage 03/08/22 1200   Output (mL) 50 mL 03/08/22 1931     Neurosurgery Physical Exam  General: Well developed, well nourished, not in acute distress.   Head: Normocephalic, atraumatic.  Neck: Full ROM.   Neurologic: Alert and oriented x 4. Thought content appropriate.  GCS: Motor:6  Verbal:5  Eyes:4   GCS Total: 15  Language: No aphasia.  Speech: No dysarthria.  Cranial nerves: Face symmetric, tongue midline, CN II-XII grossly intact.   Eyes: Pupils equal, round, reactive to light with accomodation, EOMI.   Pulmonary: Normal respirations, no signs of respiratory distress.  Abdomen: Soft, non-distended, non-tender to palpation.  Sensory: Intact to light touch throughout.  Motor Strength: Moves all extremities spontaneously with good tone. No abnormal movements seen.      Strength   Deltoids Triceps Biceps Wrist Extension Wrist Flexion Hand    Upper: R 5/5 5/5 5/5 5/5 5/5 5/5     L 5/5 5/5 5/5 5/5 5/5 5/5       Hip Flexion Knee Extension Knee  Flexion Dorsiflexion Plantar flexion EHL   Lower: R 5/5 5/5 5/5 5/5 5/5 5/5     L 5/5 5/5 5/5 5/5 5/5 5/5      Parker: Absent.  Clonus: 3 beats bilaterally.  Skin: Skin is warm, dry and intact.     Posterior lumbar incision c/d/i with staples. No surrounding erythema or edema. No drainage from incision. No palpable hematoma or underlying fluid collection.     HV drains in place to gravity.     Significant Labs:  Recent Labs   Lab 03/08/22  0554 03/09/22  0529   GLU 83 90    137   K 3.9 4.0    105   CO2 26 26   BUN 18 18    CREATININE 0.5 0.6   CALCIUM 8.0* 8.4*   MG 1.7  --      Recent Labs   Lab 03/08/22  0554 03/09/22  0529   WBC 2.28* 3.18*   HGB 7.7* 9.2*   HCT 24.5* 29.6*   PLT 60* 107*     Recent Labs   Lab 03/08/22  0554 03/09/22  0529   INR 1.4* 1.2     Microbiology Results (last 7 days)       Procedure Component Value Units Date/Time    Fungus culture [[id number]] Collected: 02/21/22 1539    Order Status: Completed Specimen: Abscess from Back Updated: 03/09/22 0911     Fungus (Mycology) Culture Culture in progress      No fungus isolated after 2 weeks    Narrative:      Epidural purulence #1    Fungus culture [[id number]] Collected: 02/21/22 1539    Order Status: Completed Specimen: Abscess from Back Updated: 03/09/22 0911     Fungus (Mycology) Culture Culture in progress      No fungus isolated after 2 weeks    Narrative:      Epidural purulence #2    Culture, Anaerobe [[id number]] Collected: 03/02/22 1228    Order Status: Completed Specimen: Back Updated: 03/09/22 0729     Anaerobic Culture No anaerobes isolated    Narrative:      L5/S1 DISC    Aerobic culture [[id number]] Collected: 03/02/22 1228    Order Status: Completed Specimen: Back Updated: 03/05/22 1108     Aerobic Bacterial Culture No growth    Narrative:      L5/S1 DISC    AFB Culture & Smear [[id number]] Collected: 03/02/22 1228    Order Status: Completed Specimen: Back Updated: 03/03/22 2127     AFB Culture & Smear Culture in progress     AFB CULTURE STAIN No acid fast bacilli seen.    Narrative:      L5/S1 DISC    Gram stain [[id number]] Collected: 03/02/22 1228    Order Status: Completed Specimen: Back Updated: 03/02/22 1423     Gram Stain Result No WBC's      No organisms seen    Narrative:      L5/S1 DISC    Fungus culture [[id number]] Collected: 03/02/22 1228    Order Status: Sent Specimen: Back Updated: 03/02/22 1251    Fungus culture [[id number]] Collected: 02/15/22 1128    Order Status: Completed Specimen: Biopsy from Back Updated: 03/02/22 1028     Fungus  (Mycology) Culture Culture in progress      No fungus isolated after 2 weeks    Narrative:      L3-4 / L4-5 osteodiscitis          All pertinent labs from the last 24 hours have been reviewed.    Significant Diagnostics:  I have reviewed and interpreted all pertinent imaging results/findings within the past 24 hours.

## 2022-03-09 NOTE — PLAN OF CARE
"Ms. Zazueta is 42 y/o F with PMH of cirrhosis, Hep C, pancytopenia, polysubstance abuse, and epidural abscess s/p L3-L4 laminectomy and L3-L5 TLIF (4/2021; blood and surgical cultures positive for group B strep) [place] [facility] [place] progressive lumbar spondylodiscitis and hardware failure. IM6 consulted for preop risk stratification and medical optimization for liver cirrhosis.     Labs and vital signs reviewed. Pancytopenia is stable. INR normalized.       MELD-Na score: 11 at 3/9/2022  5:29 AM  MELD score: 11 at 3/9/2022  5:29 AM  Calculated from:  Serum Creatinine: 0.6 mg/dL (Using min of 1 mg/dL) at 3/9/2022  5:29 AM  Serum Sodium: 137 mmol/L at 3/9/2022  5:29 AM  Total Bilirubin: 1.8 mg/dL at 3/9/2022  5:29 AM  INR(ratio): 1.2 at 3/9/2022  5:29 AM  Age: 41 years     - daily MELD score assess for decompensation of cirhosis  - continue rocephin per ID recs, PICC placed   - daily CMP, INR   - will need outpatient hepatology referral    - recommend placement on discharge for continued IV abx given hx of drug use and noncompliance with previous IV abx      Please secure [facility] ("Medicine Consult Only") or contact me directly for any additional questions or concerns        Simi Argueta MD  Internal Medicine, PGYIII  "

## 2022-03-09 NOTE — NURSING
PT alert and oriented x4 pt was very anxious throughout the night no distress noted, VSS, respiratory status intact, pt c/o 8/10 pain throughout shift. Educate pt to importance of using the call light system with any wants or needs, Updated pt to POC, pt verbalize understanding.

## 2022-03-09 NOTE — PLAN OF CARE
Problem: Adult Inpatient Plan of Care  Goal: Plan of Care Review  Outcome: Ongoing, Progressing  Goal: Patient-Specific Goal (Individualized)  Outcome: Ongoing, Progressing  Goal: Absence of Hospital-Acquired Illness or Injury  Outcome: Ongoing, Progressing  Goal: Optimal Comfort and Wellbeing  Outcome: Ongoing, Progressing  Goal: Readiness for Transition of Care  Outcome: Ongoing, Progressing     Problem: Fall Injury Risk  Goal: Absence of Fall and Fall-Related Injury  Outcome: Ongoing, Progressing  Intervention: Identify and Manage Contributors  Flowsheets (Taken 3/9/2022 0652)  Self-Care Promotion: safe use of adaptive equipment encouraged  Intervention: Promote Injury-Free Environment  Flowsheets (Taken 3/9/2022 0652)  Safety Promotion/Fall Prevention:   assistive device/personal item within reach   bed alarm set

## 2022-03-09 NOTE — PLAN OF CARE
Problem: Adult Inpatient Plan of Care  Goal: Plan of Care Review  Outcome: Ongoing, Progressing  Goal: Absence of Hospital-Acquired Illness or Injury  Outcome: Ongoing, Progressing  Goal: Optimal Comfort and Wellbeing  Outcome: Ongoing, Progressing  Goal: Readiness for Transition of Care  Outcome: Ongoing, Progressing       Plan of care discussed with patient. When to call for assistance was stressed to patient, and fall safety precautions were discussed. Patient verbalized understanding.

## 2022-03-09 NOTE — PLAN OF CARE
Problem: Adult Inpatient Plan of Care  Goal: Plan of Care Review  Outcome: Ongoing, Progressing     Problem: Fall Injury Risk  Goal: Absence of Fall and Fall-Related Injury  Outcome: Ongoing, Progressing   Pt has a good day. Ambulated with a walker. Pt pain was controlled with current regimen. No distress noted or reported to nurse

## 2022-03-09 NOTE — PT/OT/SLP PROGRESS
Occupational Therapy      Patient Name:  Rachel Zazueta   MRN:  [id number]    Patient not seen today secondary to Other (Comment), attempted at 1045 and pt w/ recent pain medication and increased lethargy, unsafe for OOB d/t decreased arousal. Attempted again at 1501, however pt leaving [facility] for ultrasound, unable to make 3rd attempt. Will follow-up as scheduled and if patient is medically stable and appropriate to be seen.    3/9/2022

## 2022-03-10 LAB
ABO + RH BLD: NORMAL
ALBUMIN SERPL BCP-MCNC: 2 G/DL (ref 3.5–5.2)
ALP SERPL-CCNC: 93 U/L (ref 55–135)
ALT SERPL W/O P-5'-P-CCNC: 22 U/L (ref 10–44)
ANION GAP SERPL CALC-SCNC: 7 MMOL/L (ref 8–16)
ANISOCYTOSIS BLD QL SMEAR: SLIGHT
AST SERPL-CCNC: 39 U/L (ref 10–40)
BASOPHILS # BLD AUTO: ABNORMAL K/UL (ref 0–0.2)
BASOPHILS NFR BLD: 1 % (ref 0–1.9)
BILIRUB SERPL-MCNC: 0.9 MG/DL (ref 0.1–1)
BLD GP AB SCN CELLS X3 SERPL QL: NORMAL
BLD PROD TYP BPU: NORMAL
BLD PROD TYP BPU: NORMAL
BLOOD UNIT EXPIRATION DATE: NORMAL
BLOOD UNIT EXPIRATION DATE: NORMAL
BLOOD UNIT TYPE CODE: 6200
BLOOD UNIT TYPE CODE: 6200
BLOOD UNIT TYPE: NORMAL
BLOOD UNIT TYPE: NORMAL
BUN SERPL-MCNC: 17 MG/DL (ref 6–20)
CALCIUM SERPL-MCNC: 7.7 MG/DL (ref 8.7–10.5)
CHLORIDE SERPL-SCNC: 105 MMOL/L (ref 95–110)
CO2 SERPL-SCNC: 27 MMOL/L (ref 23–29)
CODING SYSTEM: NORMAL
CODING SYSTEM: NORMAL
CREAT SERPL-MCNC: 0.6 MG/DL (ref 0.5–1.4)
DIFFERENTIAL METHOD: ABNORMAL
DISPENSE STATUS: NORMAL
DISPENSE STATUS: NORMAL
EOSINOPHIL # BLD AUTO: ABNORMAL K/UL (ref 0–0.5)
EOSINOPHIL NFR BLD: 6 % (ref 0–8)
ERYTHROCYTE [DISTWIDTH] IN BLOOD BY AUTOMATED COUNT: 16.9 % (ref 11.5–14.5)
EST. GFR  (AFRICAN AMERICAN): >60 ML/MIN/1.73 M^2
EST. GFR  (NON AFRICAN AMERICAN): >60 ML/MIN/1.73 M^2
GLUCOSE SERPL-MCNC: 92 MG/DL (ref 70–110)
HCT VFR BLD AUTO: 23.8 % (ref 37–48.5)
HGB BLD-MCNC: 7.2 G/DL (ref 12–16)
HYPOCHROMIA BLD QL SMEAR: ABNORMAL
IMM GRANULOCYTES # BLD AUTO: ABNORMAL K/UL (ref 0–0.04)
IMM GRANULOCYTES NFR BLD AUTO: ABNORMAL % (ref 0–0.5)
INR PPP: 1.3 (ref 0.8–1.2)
LYMPHOCYTES # BLD AUTO: ABNORMAL K/UL (ref 1–4.8)
LYMPHOCYTES NFR BLD: 18 % (ref 18–48)
MCH RBC QN AUTO: 29.1 PG (ref 27–31)
MCHC RBC AUTO-ENTMCNC: 30.3 G/DL (ref 32–36)
MCV RBC AUTO: 96 FL (ref 82–98)
MONOCYTES # BLD AUTO: ABNORMAL K/UL (ref 0.3–1)
MONOCYTES NFR BLD: 6 % (ref 4–15)
NEUTROPHILS NFR BLD: 68 % (ref 38–73)
NEUTS BAND NFR BLD MANUAL: 1 %
NRBC BLD-RTO: 0 /100 WBC
OVALOCYTES BLD QL SMEAR: ABNORMAL
PLATELET # BLD AUTO: 47 K/UL (ref 150–450)
PLATELET BLD QL SMEAR: ABNORMAL
PMV BLD AUTO: 10.3 FL (ref 9.2–12.9)
POIKILOCYTOSIS BLD QL SMEAR: SLIGHT
POLYCHROMASIA BLD QL SMEAR: ABNORMAL
POTASSIUM SERPL-SCNC: 3.8 MMOL/L (ref 3.5–5.1)
PROT SERPL-MCNC: 4.8 G/DL (ref 6–8.4)
PROTHROMBIN TIME: 13 SEC (ref 9–12.5)
RBC # BLD AUTO: 2.47 M/UL (ref 4–5.4)
SODIUM SERPL-SCNC: 139 MMOL/L (ref 136–145)
TRANS ERYTHROCYTES VOL PATIENT: NORMAL ML
UNIT NUMBER: NORMAL
WBC # BLD AUTO: 1.15 K/UL (ref 3.9–12.7)

## 2022-03-10 PROCEDURE — 25000003 PHARM REV CODE 250

## 2022-03-10 PROCEDURE — 86850 RBC ANTIBODY SCREEN: CPT

## 2022-03-10 PROCEDURE — 99024 POSTOP FOLLOW-UP VISIT: CPT | Mod: ,,,

## 2022-03-10 PROCEDURE — 63600175 PHARM REV CODE 636 W HCPCS

## 2022-03-10 PROCEDURE — 97116 GAIT TRAINING THERAPY: CPT | Mod: CQ

## 2022-03-10 PROCEDURE — A4216 STERILE WATER/SALINE, 10 ML: HCPCS | Performed by: NEUROLOGICAL SURGERY

## 2022-03-10 PROCEDURE — 85007 BL SMEAR W/DIFF WBC COUNT: CPT | Performed by: STUDENT IN AN ORGANIZED HEALTH CARE EDUCATION/TRAINING PROGRAM

## 2022-03-10 PROCEDURE — 85027 COMPLETE CBC AUTOMATED: CPT | Performed by: STUDENT IN AN ORGANIZED HEALTH CARE EDUCATION/TRAINING PROGRAM

## 2022-03-10 PROCEDURE — 25000003 PHARM REV CODE 250: Performed by: STUDENT IN AN ORGANIZED HEALTH CARE EDUCATION/TRAINING PROGRAM

## 2022-03-10 PROCEDURE — P9021 RED BLOOD CELLS UNIT: HCPCS

## 2022-03-10 PROCEDURE — P9035 PLATELET PHERES LEUKOREDUCED: HCPCS

## 2022-03-10 PROCEDURE — 85610 PROTHROMBIN TIME: CPT | Performed by: NEUROLOGICAL SURGERY

## 2022-03-10 PROCEDURE — 11000001 HC ACUTE MED/SURG PRIVATE ROOM

## 2022-03-10 PROCEDURE — 25000003 PHARM REV CODE 250: Performed by: NEUROLOGICAL SURGERY

## 2022-03-10 PROCEDURE — 99024 PR POST-OP FOLLOW-UP VISIT: ICD-10-PCS | Mod: ,,,

## 2022-03-10 PROCEDURE — 63600175 PHARM REV CODE 636 W HCPCS: Performed by: PHYSICIAN ASSISTANT

## 2022-03-10 PROCEDURE — 86900 BLOOD TYPING SEROLOGIC ABO: CPT

## 2022-03-10 PROCEDURE — C1751 CATH, INF, PER/CENT/MIDLINE: HCPCS

## 2022-03-10 PROCEDURE — 80053 COMPREHEN METABOLIC PANEL: CPT

## 2022-03-10 PROCEDURE — 36430 TRANSFUSION BLD/BLD COMPNT: CPT

## 2022-03-10 PROCEDURE — 97530 THERAPEUTIC ACTIVITIES: CPT | Mod: CQ

## 2022-03-10 RX ORDER — HYDROCODONE BITARTRATE AND ACETAMINOPHEN 500; 5 MG/1; MG/1
TABLET ORAL
Status: DISCONTINUED | OUTPATIENT
Start: 2022-03-10 | End: 2022-03-15 | Stop reason: HOSPADM

## 2022-03-10 RX ORDER — LANOLIN ALCOHOL/MO/W.PET/CERES
1 CREAM (GRAM) TOPICAL DAILY
Status: DISCONTINUED | OUTPATIENT
Start: 2022-03-11 | End: 2022-03-15 | Stop reason: HOSPADM

## 2022-03-10 RX ORDER — FUROSEMIDE 10 MG/ML
20 INJECTION INTRAMUSCULAR; INTRAVENOUS ONCE
Status: COMPLETED | OUTPATIENT
Start: 2022-03-10 | End: 2022-03-10

## 2022-03-10 RX ADMIN — Medication 10 ML: at 04:03

## 2022-03-10 RX ADMIN — SERTRALINE HYDROCHLORIDE 50 MG: 50 TABLET ORAL at 09:03

## 2022-03-10 RX ADMIN — MICONAZOLE NITRATE 2 % TOPICAL POWDER: at 09:03

## 2022-03-10 RX ADMIN — FUROSEMIDE 20 MG: 10 INJECTION, SOLUTION INTRAVENOUS at 09:03

## 2022-03-10 RX ADMIN — HEPARIN SODIUM 5000 UNITS: 5000 INJECTION INTRAVENOUS; SUBCUTANEOUS at 04:03

## 2022-03-10 RX ADMIN — OXYCODONE HYDROCHLORIDE 20 MG: 10 TABLET ORAL at 09:03

## 2022-03-10 RX ADMIN — ACETAMINOPHEN 1000 MG: 325 TABLET ORAL at 01:03

## 2022-03-10 RX ADMIN — PREGABALIN 75 MG: 75 CAPSULE ORAL at 09:03

## 2022-03-10 RX ADMIN — HEPARIN SODIUM 5000 UNITS: 5000 INJECTION INTRAVENOUS; SUBCUTANEOUS at 10:03

## 2022-03-10 RX ADMIN — CEFTRIAXONE 2 G: 2 INJECTION, POWDER, FOR SOLUTION INTRAMUSCULAR; INTRAVENOUS at 04:03

## 2022-03-10 RX ADMIN — PANTOPRAZOLE SODIUM 40 MG: 20 TABLET, DELAYED RELEASE ORAL at 09:03

## 2022-03-10 RX ADMIN — Medication 10 ML: at 01:03

## 2022-03-10 RX ADMIN — MICONAZOLE NITRATE 2 % TOPICAL POWDER: at 10:03

## 2022-03-10 RX ADMIN — ACETAMINOPHEN 1000 MG: 325 TABLET ORAL at 10:03

## 2022-03-10 RX ADMIN — OXYCODONE HYDROCHLORIDE 20 MG: 10 TABLET ORAL at 10:03

## 2022-03-10 RX ADMIN — CEFTRIAXONE 2 G: 2 INJECTION, POWDER, FOR SOLUTION INTRAMUSCULAR; INTRAVENOUS at 02:03

## 2022-03-10 RX ADMIN — SENNOSIDES AND DOCUSATE SODIUM 1 TABLET: 50; 8.6 TABLET ORAL at 09:03

## 2022-03-10 RX ADMIN — HEPARIN SODIUM 5000 UNITS: 5000 INJECTION INTRAVENOUS; SUBCUTANEOUS at 05:03

## 2022-03-10 RX ADMIN — Medication 10 ML: at 12:03

## 2022-03-10 RX ADMIN — PREGABALIN 75 MG: 75 CAPSULE ORAL at 10:03

## 2022-03-10 RX ADMIN — Medication 10 ML: at 06:03

## 2022-03-10 RX ADMIN — OXYCODONE HYDROCHLORIDE 20 MG: 10 TABLET ORAL at 02:03

## 2022-03-10 NOTE — PT/OT/SLP PROGRESS
Physical Therapy Treatment    Patient Name:  Rachel Zazueta   MRN:  [id number]    Recommendations:     Discharge Recommendations:  rehabilitation facility   Discharge Equipment Recommendations:  (TBD)   Barriers to discharge: Inaccessible home and impaired functional mobility requiring increased assistance    Assessment:     Rachel Zazueta is a 41 y.o. female admitted with a medical diagnosis of Spondylodiscitis.  She presents with the following impairments/functional limitations:  weakness, impaired endurance, impaired self care skills, impaired functional mobilty, gait instability, impaired balance, decreased lower extremity function, decreased safety awareness, pain, decreased ROM, orthopedic precautions. Pt tolerated treatment session well focusing on bed mobility, transfers, and gait training. Pt requires education and safety cues on proper bed mobility while transfering from supine <> sit to maintain spinal precautions. Pt remains unable to progress to dynamic gait training d/t heavy reliance on BUE support through RW and general instability. Pt apprehensive but fully participatory in stair training. She requires assistance for stair training and in unable to access her home independently at this time. Pt would benefit most from IP Rehab for post-acute care due to pts inaccessible home environment, intermittent home support, and inability to progress to more functional/independent activity. Pt will continue to benefit from therapy services to address impairments listed above.     Rehab Prognosis: Good; patient would benefit from acute skilled PT services to address these deficits and reach maximum level of function.    Recent Surgery: Procedure(s) (LRB):  FUSION, SPINE, LUMBAR (Bilateral) 8 Days Post-Op    Plan:     During this hospitalization, patient to be seen 3 x/week to address the identified rehab impairments via gait training, therapeutic activities, therapeutic exercises, neuromuscular  re-education and progress toward the following goals:    · Plan of Care Expires:  03/24/22    Subjective     Chief Complaint: back pain  Patient/Family Comments/goals: N/A  Pain/Comfort:  · Pain Rating 1: 8/10  · Location - Orientation 1: generalized  · Location 1: back  · Pain Addressed 1: Reposition, Distraction, Pre-medicate for activity  · Pain Rating Post-Intervention 1: 8/10      Objective:     Communicated with RN prior to session.  Patient found HOB elevated with PureWick upon PTA/SPTA entry to room.   SPTA performs interventions under supervision of writing therapist.     General Precautions: Standard, fall   Orthopedic Precautions:spinal precautions   Braces: TLSO  Respiratory Status: Room air     Functional Mobility:  · Bed Mobility:     · Rolling Left:  modified independence  · Supine to Sit: supervision  · Transfers:     · Sit to Stand:  stand by assistance with rolling walker  · Gait: Pt ambulates ~172ft with RW with SBA. Pt ambulates with reciprocal gait pattern with verbal cueing to maintain upright posture to decrease forward flexion. Widened OTIS, mild waddling gait with increased lateral sway. Heavy reliance on BUE support. Gait belt and TLSO donned for gait trial.   · Stairs:  Pt ascended/descended 1 stair  with No Assistive Device with right handrail with Moderate Assistance and forward facing ascent.  Pt ascended/descended using side step strategy to her R side for 1 stair with No Assistive Device and B hand placement on L hand rail with Minimal Assistance.       AM-PAC 6 CLICK MOBILITY  Turning over in bed (including adjusting bedclothes, sheets and blankets)?: 3  Sitting down on and standing up from a chair with arms (e.g., wheelchair, bedside commode, etc.): 3  Moving from lying on back to sitting on the side of the bed?: 3  Moving to and from a bed to a chair (including a wheelchair)?: 3  Need to walk in hospital room?: 3  Climbing 3-5 steps with a railing?: 2  Basic Mobility Total Score:  17       Therapeutic Activities and Exercises:  Pt assisted with functional mobility as noted above.   Pt's TLSO was donned while sitting on EOB, requiring moderate assistance to properly don and fit.   Pt recalls 2/3 spinal precautions. Education on spinal precautions provided. Pt verbally recalls and repeats precautions. Will continue to reinforce.     Patient left up in chair with call button in reach.    GOALS:   Multidisciplinary Problems     Physical Therapy Goals        Problem: Physical Therapy Goal    Goal Priority Disciplines Outcome Goal Variances Interventions   Physical Therapy Goal     PT, PT/OT Ongoing, Progressing     Description: Goals to be met by: 3/18/2022     Patient will increase functional independence with mobility by performin. Supine to sit with Supervision  2. Sit to supine with Supervision  3. Rolling to Left and Right with Supervision  4. Sit to stand transfer with Stand-by Assistance and RW  5. Bed to chair transfer with Minimal Assistance using Rolling Walker                     Time Tracking:     PT Received On: 03/10/22  PT Start Time: 1118     PT Stop Time: 1143  PT Total Time (min): 25 min     Billable Minutes: Gait Training 15 and Therapeutic Activity 10    Treatment Type: Treatment  PT/PTA: PTA     PTA Visit Number: 2     03/10/2022

## 2022-03-10 NOTE — PROGRESS NOTES
[facility] ([facility])  Neurosurgery  Progress Note    Subjective:     History of Present Illness: Rachel Zazueta is a 41 y.o.  female with PMHx of Hepatitis C, liver cirrhosis, pancytopenia, and polysubstance abuse, who presents to clinic for follow up with new imaging s/p L3 and L4 laminectomy for evacuation of epidural abscess and L3-L5 TLIF on 04/16/2021.     The pt c/o worsening back and left leg pain since October. She states that she is no longer using IV drugs. States only using marijuana. Describes the left leg pain as a shock down the leg when standing for 10 minutes. Denies taking any antibiotics. Endorses new weakness in the legs. Denies b/b dysfunction. Denies new neck pain. She ambulates with a walker at home. States that she smoke.    She presents to ED as direct admit from clinic.       Post-Op Info:  Procedure(s) (LRB):  FUSION, SPINE, LUMBAR (Bilateral)   8 Days Post-Op     Interval History: NAEON. AFVSS. Platelets 47, will transfuse 1u plts. Hemoglobin 7.2, will transfuse 1u pRBCs. Patient reports feeling significantly more tired today. Denies sob. Neuro exam is stable. Both HV drains removed. US BLE negative. Lasix 40 mg given yesterday for LE edema. Reports she takes Lasix 20 mg at home, unable to find in chart. Will need f/u with PCP for continued management. Pending authorization for rehab.     Medications:  Continuous Infusions:   sodium chloride 0.9% Stopped (03/03/22 3123)     Scheduled Meds:   acetaminophen  1,000 mg Oral Q6H    cefTRIAXone (ROCEPHIN) IVPB  2 g Intravenous Q12H    heparin (porcine)  5,000 Units Subcutaneous Q8H    LIDOcaine  1 patch Transdermal Q24H    miconazole NITRATE 2 %   Topical (Top) BID    pantoprazole  40 mg Oral Daily    pregabalin  75 mg Oral TID    senna-docusate 8.6-50 mg  1 tablet Oral Daily    sertraline  50 mg Oral Daily    sodium chloride 0.9%  10 mL Intravenous Q6H     PRN Meds:sodium chloride, sodium chloride, sodium chloride,  aluminum-magnesium hydroxide-simethicone, dextrose 10%, dextrose 10%, glucagon (human recombinant), glucose, glucose, HYDROmorphone, melatonin, oxyCODONE, oxyCODONE, polyethylene glycol, Flushing PICC Protocol **AND** sodium chloride 0.9% **AND** sodium chloride 0.9%       Objective:     Weight: 133.8 kg (294 lb 15.6 oz)  Body mass index is 46.2 kg/m².  Vital Signs (Most Recent):  Temp: 98.4 °F (36.9 °C) (03/10/22 0752)  Pulse: 100 (03/10/22 0752)  Resp: 18 (03/10/22 0926)  BP: (!) 103/52 (03/10/22 0752)  SpO2: 100 % (03/10/22 0752)   Vital Signs (24h Range):  Temp:  [97.8 °F (36.6 °C)-98.9 °F (37.2 °C)] 98.4 °F (36.9 °C)  Pulse:  [] 100  Resp:  [8-20] 18  SpO2:  [96 %-100 %] 100 %  BP: (103-148)/(52-72) 103/52     Neurosurgery Physical Exam  General: Well developed, well nourished, not in acute distress.   Head: Normocephalic, atraumatic.  Neck: Full ROM.   Neurologic: Alert and oriented x 4. Thought content appropriate.  GCS: Motor:6  Verbal:5  Eyes:4   GCS Total: 15  Language: No aphasia.  Speech: No dysarthria.  Cranial nerves: Face symmetric, tongue midline, CN II-XII grossly intact.   Eyes: Pupils equal, round, reactive to light with accomodation, EOMI.   Pulmonary: Normal respirations, no signs of respiratory distress.  Abdomen: Soft, non-distended, non-tender to palpation.  Sensory: Intact to light touch throughout.  Motor Strength: Moves all extremities spontaneously with good tone. No abnormal movements seen.      Strength   Deltoids Triceps Biceps Wrist Extension Wrist Flexion Hand    Upper: R 5/5 5/5 5/5 5/5 5/5 5/5     L 5/5 5/5 5/5 5/5 5/5 5/5       Hip Flexion Knee Extension Knee  Flexion Dorsiflexion Plantar flexion EHL   Lower: R 5/5 5/5 5/5 5/5 5/5 5/5     L 5/5 5/5 5/5 5/5 5/5 5/5      Parker: Absent.  Clonus: 3 beats bilaterally.  Vascular: Bilateral LE pitting edema.   Skin: Skin is warm, dry and intact.     Posterior lumbar incision c/d/i with staples. No surrounding erythema or  edema. No drainage from incision. No palpable hematoma or underlying fluid collection.    HV drain sites covered with gauze and tegaderm. Minimal drainage.     Significant Labs:  Recent Labs   Lab 03/09/22  0529 03/10/22  0449   GLU 90 92    139   K 4.0 3.8    105   CO2 26 27   BUN 18 17   CREATININE 0.6 0.6   CALCIUM 8.4* 7.7*     Recent Labs   Lab 03/09/22  0529 03/10/22  0449   WBC 3.18* 1.15*   HGB 9.2* 7.2*   HCT 29.6* 23.8*   * 47*     Recent Labs   Lab 03/09/22  0529 03/10/22  0605   INR 1.2 1.3*     Microbiology Results (last 7 days)       Procedure Component Value Units Date/Time    Fungus culture [[id number]] Collected: 03/02/22 1228    Order Status: Completed Specimen: Back Updated: 03/09/22 1120     Fungus (Mycology) Culture Culture in progress    Narrative:      L5/S1 DISC    Fungus culture [[id number]] Collected: 02/21/22 1539    Order Status: Completed Specimen: Abscess from Back Updated: 03/09/22 0911     Fungus (Mycology) Culture Culture in progress      No fungus isolated after 2 weeks    Narrative:      Epidural purulence #1    Fungus culture [[id number]] Collected: 02/21/22 1539    Order Status: Completed Specimen: Abscess from Back Updated: 03/09/22 0911     Fungus (Mycology) Culture Culture in progress      No fungus isolated after 2 weeks    Narrative:      Epidural purulence #2    Culture, Anaerobe [[id number]] Collected: 03/02/22 1228    Order Status: Completed Specimen: Back Updated: 03/09/22 0729     Anaerobic Culture No anaerobes isolated    Narrative:      L5/S1 DISC    Aerobic culture [[id number]] Collected: 03/02/22 1228    Order Status: Completed Specimen: Back Updated: 03/05/22 1108     Aerobic Bacterial Culture No growth    Narrative:      L5/S1 DISC    AFB Culture & Smear [[id number]] Collected: 03/02/22 1228    Order Status: Completed Specimen: Back Updated: 03/03/22 2127     AFB Culture & Smear Culture in progress     AFB CULTURE STAIN No acid fast bacilli seen.     Narrative:      L5/S1 DISC          All pertinent labs from the last 24 hours have been reviewed.    Significant Diagnostics:  I have reviewed and interpreted all pertinent imaging results/findings within the past 24 hours.    Assessment/Plan:     * Spondylodiscitis  Rachel Zazueta is a 41 F with PMH hepatitis C, cirrhosis, pancytopenia, nicotine abuse, hx IVDU (last use 2 years ago), strep agalactae bacteremia, s/p L3-5 TLIF on 4/16/2021 who presents with persistent and progressive lumbar spondylodiscitis with resultant hardware failure and new scoliosis.     S/p hardware removal on 2/21. Now s/p T10 t pelvis posterior instrumented fusion with L5-S1 TLIF on 3/2.     Plan:  --Patient is floor status.       -q4h neurochecks.  --Post op scoliosis obtained with good hardware placement.  --All labs and diagnostics personally reviewed.  --Follow-up MRI Brain, C/T/L w/wo contrast:   -No evidence of osteomyelitis, discitis, or epidural abscess within the cervical or thoracic spine   -No diffusion positive or abnormal enhancement within the brain   -MRI brain shows patchy nonspecific increased T2 and FLAIR signal in the periventricular and subcortical white matter bilaterally. Could be due to chronic microvascular ischemic changes vs demyelinating disease given patient's age. No evidence of active demyelination.   -DDD C4-7 with mild to moderate canal stenosis  --Full infectious w/u.   -Inflammatory markers wnl. BCx 2/14 NGTD, UA/Ucx pending. Wound Cx from 2/21 neg. Wound Cx from 3/2 pending.    -F/u ID recs - now on Rocephin x 8 weeks. PICC placed.    -Will need placement for IV abx therapy given h/o of IVDU.  --S/p L3-4 disc biopsy and L3 bone biopsy with IR 2/15 - Gram stain rare WBC. Cx NGTD.  --Utox + for amphetamines, addiction psych consulted per .   --Hold anti-plt/coag medications.  --Monitor for urinary retention - voiding spontaneously. Bladder scan prn.  --Pain control: Oxy 10 q4h PRN for moderate pain,  Oxy 20 q4h PRN for severe pain, Dilaudid 1 mg q4h PRN when pain not relieved by PO meds wean to off. Continue lyrica 50 mg TID.  --Drains: to gravity. Continue IV abx.  --Plt 47 this am; goal > 80k. Will transfuse 1u platelets.  --H&H 7.2 and 23.8 this am. Will transfuse 1u pRBCs.  --Lasix 40 mg IV given for LE edema. Will repeat today. Self reports home Lasix 20 mg daily. Patient will need f/u with PCP for continued management. Denies shortness of breath, chest pain, calf pain.  --DVT ppx: TEDs/SCDs/SQH. BLE US on 3/9 negative.  --Bowel regimen: Miralax, senna. + BM.  --Activity: PT/OT OOB, TLSO brace when OOB.  --Continue to monitor clinically, notify NSGY immediately with any changes in neuro status.    Discussed with Dr. Templeton.    Dispo: Pending rehab authorization and platelet and H&H status.        Afua Campbell PA-C  Neurosurgery  [facility] - Neuro[facility] ([facility])

## 2022-03-10 NOTE — ASSESSMENT & PLAN NOTE
Rachel Zazueta is a 41 F with PMH hepatitis C, cirrhosis, pancytopenia, nicotine abuse, hx IVDU (last use 2 years ago), strep agalactae bacteremia, s/p L3-5 TLIF on 4/16/2021 who presents with persistent and progressive lumbar spondylodiscitis with resultant hardware failure and new scoliosis.     S/p hardware removal on 2/21. Now s/p T10 t pelvis posterior instrumented fusion with L5-S1 TLIF on 3/2.     Plan:  --Patient is floor status.       -q4h neurochecks.  --Post op scoliosis obtained with good hardware placement.  --All labs and diagnostics personally reviewed.  --Follow-up MRI Brain, C/T/L w/wo contrast:   -No evidence of osteomyelitis, discitis, or epidural abscess within the cervical or thoracic spine   -No diffusion positive or abnormal enhancement within the brain   -MRI brain shows patchy nonspecific increased T2 and FLAIR signal in the periventricular and subcortical white matter bilaterally. Could be due to chronic microvascular ischemic changes vs demyelinating disease given patient's age. No evidence of active demyelination.   -DDD C4-7 with mild to moderate canal stenosis  --Full infectious w/u.   -Inflammatory markers wnl. BCx 2/14 NGTD, UA/Ucx pending. Wound Cx from 2/21 neg. Wound Cx from 3/2 pending.    -F/u ID recs - now on Rocephin x 8 weeks. PICC placed.    -Will need placement for IV abx therapy given h/o of IVDU.  --S/p L3-4 disc biopsy and L3 bone biopsy with IR 2/15 - Gram stain rare WBC. Cx NGTD.  --Utox + for amphetamines, addiction psych consulted per .   --Hold anti-plt/coag medications.  --Monitor for urinary retention - voiding spontaneously. Bladder scan prn.  --Pain control: Oxy 10 q4h PRN for moderate pain, Oxy 20 q4h PRN for severe pain, Dilaudid 1 mg q4h PRN when pain not relieved by PO meds wean to off. Continue lyrica 50 mg TID.  --Drains: to gravity. Continue IV abx.  --Plt 47 this am; goal > 80k. Will transfuse 1u platelets.  --H&H 7.2 and 23.8 this am. Will  transfuse 1u pRBCs.  --Lasix 40 mg IV given for LE edema. Will repeat today. Self reports home Lasix 20 mg daily. Patient will need f/u with PCP for continued management. Denies shortness of breath, chest pain, calf pain.  --DVT ppx: TEDs/SCDs/SQH. BLE US on 3/9 negative.  --Bowel regimen: Miralax, senna. + BM.  --Activity: PT/OT OOB, TLSO brace when OOB.  --Continue to monitor clinically, notify NSGY immediately with any changes in neuro status.    Discussed with Dr. Templeton.    Dispo: Pending rehab authorization and platelet and H&H status.

## 2022-03-10 NOTE — SUBJECTIVE & OBJECTIVE
Interval History: NAEON. AFVSS. Platelets 47, will transfuse 1u plts. Hemoglobin 7.2, will transfuse 1u pRBCs. Patient reports feeling significantly more tired today. Denies sob. Neuro exam is stable. Both HV drains removed. US BLE negative. Lasix 40 mg given yesterday for LE edema. Reports she takes Lasix 20 mg at home, unable to find in chart. Will need f/u with PCP for continued management. Pending authorization for rehab.     Medications:  Continuous Infusions:   sodium chloride 0.9% Stopped (03/03/22 1708)     Scheduled Meds:   acetaminophen  1,000 mg Oral Q6H    cefTRIAXone (ROCEPHIN) IVPB  2 g Intravenous Q12H    heparin (porcine)  5,000 Units Subcutaneous Q8H    LIDOcaine  1 patch Transdermal Q24H    miconazole NITRATE 2 %   Topical (Top) BID    pantoprazole  40 mg Oral Daily    pregabalin  75 mg Oral TID    senna-docusate 8.6-50 mg  1 tablet Oral Daily    sertraline  50 mg Oral Daily    sodium chloride 0.9%  10 mL Intravenous Q6H     PRN Meds:sodium chloride, sodium chloride, sodium chloride, aluminum-magnesium hydroxide-simethicone, dextrose 10%, dextrose 10%, glucagon (human recombinant), glucose, glucose, HYDROmorphone, melatonin, oxyCODONE, oxyCODONE, polyethylene glycol, Flushing PICC Protocol **AND** sodium chloride 0.9% **AND** sodium chloride 0.9%       Objective:     Weight: 133.8 kg (294 lb 15.6 oz)  Body mass index is 46.2 kg/m².  Vital Signs (Most Recent):  Temp: 98.4 °F (36.9 °C) (03/10/22 0752)  Pulse: 100 (03/10/22 0752)  Resp: 18 (03/10/22 0926)  BP: (!) 103/52 (03/10/22 0752)  SpO2: 100 % (03/10/22 0752)   Vital Signs (24h Range):  Temp:  [97.8 °F (36.6 °C)-98.9 °F (37.2 °C)] 98.4 °F (36.9 °C)  Pulse:  [] 100  Resp:  [8-20] 18  SpO2:  [96 %-100 %] 100 %  BP: (103-148)/(52-72) 103/52     Neurosurgery Physical Exam  General: Well developed, well nourished, not in acute distress.   Head: Normocephalic, atraumatic.  Neck: Full ROM.   Neurologic: Alert and oriented x 4. Thought content  appropriate.  GCS: Motor:6  Verbal:5  Eyes:4   GCS Total: 15  Language: No aphasia.  Speech: No dysarthria.  Cranial nerves: Face symmetric, tongue midline, CN II-XII grossly intact.   Eyes: Pupils equal, round, reactive to light with accomodation, EOMI.   Pulmonary: Normal respirations, no signs of respiratory distress.  Abdomen: Soft, non-distended, non-tender to palpation.  Sensory: Intact to light touch throughout.  Motor Strength: Moves all extremities spontaneously with good tone. No abnormal movements seen.      Strength   Deltoids Triceps Biceps Wrist Extension Wrist Flexion Hand    Upper: R 5/5 5/5 5/5 5/5 5/5 5/5     L 5/5 5/5 5/5 5/5 5/5 5/5       Hip Flexion Knee Extension Knee  Flexion Dorsiflexion Plantar flexion EHL   Lower: R 5/5 5/5 5/5 5/5 5/5 5/5     L 5/5 5/5 5/5 5/5 5/5 5/5      Parker: Absent.  Clonus: 3 beats bilaterally.  Vascular: Bilateral LE pitting edema.   Skin: Skin is warm, dry and intact.     Posterior lumbar incision c/d/i with staples. No surrounding erythema or edema. No drainage from incision. No palpable hematoma or underlying fluid collection.    HV drain sites covered with gauze and tegaderm. Minimal drainage.     Significant Labs:  Recent Labs   Lab 03/09/22  0529 03/10/22  0449   GLU 90 92    139   K 4.0 3.8    105   CO2 26 27   BUN 18 17   CREATININE 0.6 0.6   CALCIUM 8.4* 7.7*     Recent Labs   Lab 03/09/22 0529 03/10/22  0449   WBC 3.18* 1.15*   HGB 9.2* 7.2*   HCT 29.6* 23.8*   * 47*     Recent Labs   Lab 03/09/22  0529 03/10/22  0605   INR 1.2 1.3*     Microbiology Results (last 7 days)       Procedure Component Value Units Date/Time    Fungus culture [[id number]] Collected: 03/02/22 1228    Order Status: Completed Specimen: Back Updated: 03/09/22 1120     Fungus (Mycology) Culture Culture in progress    Narrative:      L5/S1 DISC    Fungus culture [[id number]] Collected: 02/21/22 1539    Order Status: Completed Specimen: Abscess from Back  Updated: 03/09/22 0911     Fungus (Mycology) Culture Culture in progress      No fungus isolated after 2 weeks    Narrative:      Epidural purulence #1    Fungus culture [[id number]] Collected: 02/21/22 1539    Order Status: Completed Specimen: Abscess from Back Updated: 03/09/22 0911     Fungus (Mycology) Culture Culture in progress      No fungus isolated after 2 weeks    Narrative:      Epidural purulence #2    Culture, Anaerobe [[id number]] Collected: 03/02/22 1228    Order Status: Completed Specimen: Back Updated: 03/09/22 0729     Anaerobic Culture No anaerobes isolated    Narrative:      L5/S1 DISC    Aerobic culture [[id number]] Collected: 03/02/22 1228    Order Status: Completed Specimen: Back Updated: 03/05/22 1108     Aerobic Bacterial Culture No growth    Narrative:      L5/S1 DISC    AFB Culture & Smear [[id number]] Collected: 03/02/22 1228    Order Status: Completed Specimen: Back Updated: 03/03/22 2127     AFB Culture & Smear Culture in progress     AFB CULTURE STAIN No acid fast bacilli seen.    Narrative:      L5/S1 DISC          All pertinent labs from the last 24 hours have been reviewed.    Significant Diagnostics:  I have reviewed and interpreted all pertinent imaging results/findings within the past 24 hours.

## 2022-03-10 NOTE — PT/OT/SLP PROGRESS
Occupational Therapy      Patient Name:  Rachel Zazueta   MRN:  [id number]    Patient not seen today secondary to pt working w/ PT in AM and in PM pt receiving Blood transfusion and platelets and informed by RN pt w/ low BP (SBP <97). Will follow-up as scheduled and if patient is medically stable and appropriate to be seen.    3/10/2022

## 2022-03-10 NOTE — CONSULTS
Went to pt bedside to assess picc line, line needs cathflo to one port, spoke with charge nurse about using cathflo.

## 2022-03-10 NOTE — PLAN OF CARE
"Ms. Zazueta is 42 y/o F with PMH of cirrhosis, Hep C, pancytopenia, polysubstance abuse, and epidural abscess s/p L3-L4 laminectomy and L3-L5 TLIF (4/2021; blood and surgical cultures positive for group B strep) admitted from [facility] clinic with progressive lumbar spondylodiscitis and hardware failure. IM6 consulted for preop risk stratification and medical optimization for liver cirrhosis.      Labs and vital signs reviewed. Pancytopenia is worsening today. INR normalized. B/l LE swelling, US with no DVT and received IV lasix.      MELD-Na score: 9 at 3/10/2022  6:05 AM  MELD score: 9 at 3/10/2022  6:05 AM  Calculated from:  Serum Creatinine: 0.6 mg/dL (Using min of 1 mg/dL) at 3/10/2022  4:49 AM  Serum Sodium: 139 mmol/L (Using max of 137 mmol/L) at 3/10/2022  4:49 AM  Total Bilirubin: 0.9 mg/dL (Using min of 1 mg/dL) at 3/10/2022  4:49 AM  INR(ratio): 1.3 at 3/10/2022  6:05 AM  Age: 41 years;    - daily MELD score assess for decompensation of cirhosis  - continue rocephin per ID recs, PICC placed   - daily CMP, INR   - transfuse for of blood product per primary team    - will need outpatient hepatology referral    - recommend placement on discharge for continued IV abx given hx of drug use and noncompliance with previous IV abx      Please secure [facility]  ("Medicine Consult Only") or contact me directly for any additional questions or concerns    Simi Argueta MD  Internal Medicine, PGYIII  "

## 2022-03-10 NOTE — PLAN OF CARE
[facility] - Neurosurgery (Hospital)  Discharge Reassessment    Primary Care Provider: Dannielle Merino DO    Expected Discharge Date: 3/11/2022     Patient is medically ready for discharge.  Patient accepted to [facility] and is pending insurance auth.    Reassessment (most recent)     Discharge Reassessment - 03/10/22 1058        Discharge Reassessment    Assessment Type Discharge Planning Reassessment     Did the patient's condition or plan change since previous assessment? No     Discharge Plan discussed with: Patient     Communicated SHIRA with patient/caregiver Date not available/Unable to determine     Discharge Plan A Rehab     Discharge Plan B Home with family     DME Needed Upon Discharge  none     Discharge Barriers Identified None     Why the patient remains in the hospital Insurance issues        Post-Acute Status    Post-Acute Authorization Placement     Post-Acute Placement Status Pending payor review/awaiting authorization (if required)     Discharge Delays None known at this time

## 2022-03-11 LAB
ALBUMIN SERPL BCP-MCNC: 2.2 G/DL (ref 3.5–5.2)
ALP SERPL-CCNC: 93 U/L (ref 55–135)
ALT SERPL W/O P-5'-P-CCNC: 20 U/L (ref 10–44)
ANION GAP SERPL CALC-SCNC: 8 MMOL/L (ref 8–16)
ANISOCYTOSIS BLD QL SMEAR: SLIGHT
AST SERPL-CCNC: 39 U/L (ref 10–40)
BASOPHILS # BLD AUTO: 0.02 K/UL (ref 0–0.2)
BASOPHILS NFR BLD: 2.1 % (ref 0–1.9)
BILIRUB SERPL-MCNC: 1.5 MG/DL (ref 0.1–1)
BUN SERPL-MCNC: 15 MG/DL (ref 6–20)
CALCIUM SERPL-MCNC: 7.9 MG/DL (ref 8.7–10.5)
CHLORIDE SERPL-SCNC: 109 MMOL/L (ref 95–110)
CO2 SERPL-SCNC: 26 MMOL/L (ref 23–29)
CREAT SERPL-MCNC: 0.6 MG/DL (ref 0.5–1.4)
DIFFERENTIAL METHOD: ABNORMAL
EOSINOPHIL # BLD AUTO: 0.1 K/UL (ref 0–0.5)
EOSINOPHIL NFR BLD: 9.5 % (ref 0–8)
ERYTHROCYTE [DISTWIDTH] IN BLOOD BY AUTOMATED COUNT: 16.4 % (ref 11.5–14.5)
EST. GFR  (AFRICAN AMERICAN): >60 ML/MIN/1.73 M^2
EST. GFR  (NON AFRICAN AMERICAN): >60 ML/MIN/1.73 M^2
GLUCOSE SERPL-MCNC: 85 MG/DL (ref 70–110)
HCT VFR BLD AUTO: 25.8 % (ref 37–48.5)
HGB BLD-MCNC: 7.7 G/DL (ref 12–16)
IMM GRANULOCYTES # BLD AUTO: 0.01 K/UL (ref 0–0.04)
IMM GRANULOCYTES NFR BLD AUTO: 1.1 % (ref 0–0.5)
INR PPP: 1.2 (ref 0.8–1.2)
LYMPHOCYTES # BLD AUTO: 0.3 K/UL (ref 1–4.8)
LYMPHOCYTES NFR BLD: 32.6 % (ref 18–48)
MCH RBC QN AUTO: 28.5 PG (ref 27–31)
MCHC RBC AUTO-ENTMCNC: 29.8 G/DL (ref 32–36)
MCV RBC AUTO: 96 FL (ref 82–98)
MONOCYTES # BLD AUTO: 0.1 K/UL (ref 0.3–1)
MONOCYTES NFR BLD: 11.6 % (ref 4–15)
NEUTROPHILS # BLD AUTO: 0.4 K/UL (ref 1.8–7.7)
NEUTROPHILS NFR BLD: 43.1 % (ref 38–73)
NRBC BLD-RTO: 0 /100 WBC
PLATELET # BLD AUTO: 48 K/UL (ref 150–450)
PLATELET BLD QL SMEAR: ABNORMAL
PMV BLD AUTO: 11.5 FL (ref 9.2–12.9)
POTASSIUM SERPL-SCNC: 3.8 MMOL/L (ref 3.5–5.1)
PROT SERPL-MCNC: 5.1 G/DL (ref 6–8.4)
PROTHROMBIN TIME: 12.7 SEC (ref 9–12.5)
RBC # BLD AUTO: 2.7 M/UL (ref 4–5.4)
SODIUM SERPL-SCNC: 143 MMOL/L (ref 136–145)
WBC # BLD AUTO: 0.95 K/UL (ref 3.9–12.7)

## 2022-03-11 PROCEDURE — 63600175 PHARM REV CODE 636 W HCPCS

## 2022-03-11 PROCEDURE — 99232 SBSQ HOSP IP/OBS MODERATE 35: CPT | Mod: ,,, | Performed by: HOSPITALIST

## 2022-03-11 PROCEDURE — 25000003 PHARM REV CODE 250

## 2022-03-11 PROCEDURE — 63600175 PHARM REV CODE 636 W HCPCS: Performed by: PHYSICIAN ASSISTANT

## 2022-03-11 PROCEDURE — 25000003 PHARM REV CODE 250: Performed by: STUDENT IN AN ORGANIZED HEALTH CARE EDUCATION/TRAINING PROGRAM

## 2022-03-11 PROCEDURE — 85610 PROTHROMBIN TIME: CPT

## 2022-03-11 PROCEDURE — 97530 THERAPEUTIC ACTIVITIES: CPT

## 2022-03-11 PROCEDURE — A4216 STERILE WATER/SALINE, 10 ML: HCPCS | Performed by: NEUROLOGICAL SURGERY

## 2022-03-11 PROCEDURE — C1751 CATH, INF, PER/CENT/MIDLINE: HCPCS

## 2022-03-11 PROCEDURE — 99232 PR SUBSEQUENT HOSPITAL CARE,LEVL II: ICD-10-PCS | Mod: ,,, | Performed by: HOSPITALIST

## 2022-03-11 PROCEDURE — 85025 COMPLETE CBC W/AUTO DIFF WBC: CPT | Performed by: STUDENT IN AN ORGANIZED HEALTH CARE EDUCATION/TRAINING PROGRAM

## 2022-03-11 PROCEDURE — 11000001 HC ACUTE MED/SURG PRIVATE ROOM

## 2022-03-11 PROCEDURE — 99024 POSTOP FOLLOW-UP VISIT: CPT | Mod: ,,,

## 2022-03-11 PROCEDURE — 25000003 PHARM REV CODE 250: Performed by: NEUROLOGICAL SURGERY

## 2022-03-11 PROCEDURE — 80053 COMPREHEN METABOLIC PANEL: CPT

## 2022-03-11 PROCEDURE — 99024 PR POST-OP FOLLOW-UP VISIT: ICD-10-PCS | Mod: ,,,

## 2022-03-11 PROCEDURE — 97535 SELF CARE MNGMENT TRAINING: CPT

## 2022-03-11 RX ORDER — FERROUS SULFATE 325(65) MG
325 TABLET, DELAYED RELEASE (ENTERIC COATED) ORAL DAILY
Refills: 0 | Status: ON HOLD
Start: 2022-03-11 | End: 2022-03-22 | Stop reason: HOSPADM

## 2022-03-11 RX ORDER — OXYCODONE HYDROCHLORIDE 20 MG/1
20 TABLET ORAL EVERY 4 HOURS PRN
Refills: 0 | Status: ON HOLD
Start: 2022-03-11 | End: 2022-03-22 | Stop reason: HOSPADM

## 2022-03-11 RX ORDER — PREGABALIN 75 MG/1
75 CAPSULE ORAL 3 TIMES DAILY
Qty: 90 CAPSULE | Refills: 6 | Status: ON HOLD
Start: 2022-03-11 | End: 2022-03-22 | Stop reason: HOSPADM

## 2022-03-11 RX ADMIN — Medication 6 MG: at 09:03

## 2022-03-11 RX ADMIN — ACETAMINOPHEN 1000 MG: 325 TABLET ORAL at 05:03

## 2022-03-11 RX ADMIN — OXYCODONE HYDROCHLORIDE 10 MG: 10 TABLET ORAL at 04:03

## 2022-03-11 RX ADMIN — CEFTRIAXONE 2 G: 2 INJECTION, POWDER, FOR SOLUTION INTRAMUSCULAR; INTRAVENOUS at 01:03

## 2022-03-11 RX ADMIN — HEPARIN SODIUM 5000 UNITS: 5000 INJECTION INTRAVENOUS; SUBCUTANEOUS at 09:03

## 2022-03-11 RX ADMIN — PANTOPRAZOLE SODIUM 40 MG: 20 TABLET, DELAYED RELEASE ORAL at 09:03

## 2022-03-11 RX ADMIN — HEPARIN SODIUM 5000 UNITS: 5000 INJECTION INTRAVENOUS; SUBCUTANEOUS at 05:03

## 2022-03-11 RX ADMIN — SENNOSIDES AND DOCUSATE SODIUM 1 TABLET: 50; 8.6 TABLET ORAL at 09:03

## 2022-03-11 RX ADMIN — ACETAMINOPHEN 1000 MG: 325 TABLET ORAL at 12:03

## 2022-03-11 RX ADMIN — CEFTRIAXONE 2 G: 2 INJECTION, POWDER, FOR SOLUTION INTRAMUSCULAR; INTRAVENOUS at 03:03

## 2022-03-11 RX ADMIN — Medication 10 ML: at 05:03

## 2022-03-11 RX ADMIN — FERROUS SULFATE TAB 325 MG (65 MG ELEMENTAL FE) 1 EACH: 325 (65 FE) TAB at 09:03

## 2022-03-11 RX ADMIN — PREGABALIN 75 MG: 75 CAPSULE ORAL at 09:03

## 2022-03-11 RX ADMIN — PREGABALIN 75 MG: 75 CAPSULE ORAL at 03:03

## 2022-03-11 RX ADMIN — Medication 10 ML: at 12:03

## 2022-03-11 RX ADMIN — OXYCODONE HYDROCHLORIDE 10 MG: 10 TABLET ORAL at 09:03

## 2022-03-11 RX ADMIN — MICONAZOLE NITRATE 2 % TOPICAL POWDER: at 09:03

## 2022-03-11 RX ADMIN — SERTRALINE HYDROCHLORIDE 50 MG: 50 TABLET ORAL at 09:03

## 2022-03-11 RX ADMIN — Medication 10 ML: at 01:03

## 2022-03-11 RX ADMIN — OXYCODONE HYDROCHLORIDE 20 MG: 10 TABLET ORAL at 05:03

## 2022-03-11 RX ADMIN — HEPARIN SODIUM 5000 UNITS: 5000 INJECTION INTRAVENOUS; SUBCUTANEOUS at 02:03

## 2022-03-11 NOTE — PT/OT/SLP PROGRESS
Occupational Therapy   Treatment    Patient Name:  Rachel Zazueta   MRN:  [id number]  Admit Date: 2/14/2022  Admitting Diagnosis:  Spondylodiscitis   Length of Stay: 25 days  Recent Surgery: Procedure(s) (LRB):  FUSION, SPINE, LUMBAR (Bilateral) 9 Days Post-Op    Procedure(s):  REMOVAL, HARDWARE, SPINE     Recommendations:     Discharge Recommendations: rehabilitation facility  Discharge Equipment Recommendations:   (TBD)  Barriers to discharge:  Inaccessible home environment (fall risk)    Plan:     Patient to be seen 3 x/week to address the above listed problems via self-care/home management, therapeutic activities, therapeutic exercises, neuromuscular re-education  · Plan of Care Expires: 03/21/22  · Plan of Care Reviewed with: patient    Assessment:   Rachel Zazueta is a 41 y.o. female with a medical diagnosis of Spondylodiscitis.  She presents with the following performance deficits affecting function: weakness, impaired endurance, impaired self care skills, impaired functional mobilty, decreased safety awareness, decreased ROM, orthopedic precautions, gait instability, impaired balance.  Pt would benefit from skilled OT services in order to maximize independence with ADLs and facilitate safe discharge. Pt's clinical condition meets full inpatient rehab criteria. Inpatient rehab will provide to total interdisciplinary treatment approach needed. Pt is at high risk of unplanned readmission due to fall risk. The lower level of care cannot provide total interdisciplinary approach needed. Because of patient's significant decline from PLOF, patient would benefit from Inpatient Rehab to maximize return to PLOF. Pt is an excellent candidate for inpatient rehabilitation, as he has a qualifying diagnosis, displays good activity tolerance, has experienced decline from PLOF, has good family support, and is motivated to participate in therapy. .    Time spent performing bed mobility, self-care, and  "ambulation    Relevant hx and current limitations:   Spinal precautions -TLSO    Pt continues to require significant assist with functional mobility and safe completion of ADLs requiring Mod-Min A. Pt with noted improvement with self-care and ambulation as compared to last session. Patient is making progress towards goals.    Rehab Prognosis: Good; patient would benefit from acute skilled OT services to address these deficits and reach maximum level of function.        Subjective   Communicated with: RAUL Orosco prior to session.  Patient found left sidelying with peripheral IV, PureWick upon OT entry to room.    Patient: Man these last few days have been a lot of up and down    Pain/Comfort:  Pain Rating 1: 4/10  Location - Orientation 1: generalized  Location 1: back  Pain Addressed 1: Reposition, Distraction, Pre-medicate for activity    Objective:   Patient found with: peripheral IV, PureWick   General Precautions: Standard, fall   Orthopedic Precautions:spinal precautions   Braces: TLSO   Oxygen Device: Room Air  Vitals: BP (!) 123/58 (Patient Position: Lying)   Pulse 93   Temp 98.9 °F (37.2 °C) (Axillary)   Resp 18   Ht 5' 7" (1.702 m)   Wt 133.8 kg (294 lb 15.6 oz)   LMP  (LMP Unknown)   SpO2 99%   Breastfeeding No   BMI 46.20 kg/m²     Outcome Measures:  [facility] 6 Click ADL: 17    Occupational Performance:  Bed Mobility:       Scooting: towards EOB with supervision   Supine to Sit: stand by assistance    Functional Mobility/Transfers:     Sit to Stand: stand by assistance using rolling walker    Pt ambulated 125' CGA with HHA to simulate household and/or community distances in order to maximize functional activity tolerance and dynamic standing balance required for engagement in occupations of choice.   o LOB: yes, w/ Min A to recover w/ slight mistep   o SOB: No  o Dizziness: No    Activities of Daily Living:    Grooming: stand by assistance to perform oral and hair hygiene standing at " sink   Upper Body Dressing: minimum assistance to doff/don TLSO sitting EOB; pt able to don gown backwards w/ increased time   Lower Body Dressing: total assistance to don/doff socks sitting EOB; Mod A to don underwear sitting EOB (pt able to fish underwear on 1 leg and req'd A to don on the other, able to pull up over waist w/ increased time      AMPAC 6 Click ADL:  AMPAC Total Score: 17    Treatment & Education:   Therapist provided facilitation and instruction of proper body mechanics, energy conservation, and fall prevention strategies during tasks listed above.   Pt re-educated on importance of OOB activities with staff member assistance and sitting OOB majority of the day.    Updated communication board with level of assist required (CGA) & educated RN/patient that pt is appropriate for transfers and mobility with RN/PCT.       Patient left up in chair with all lines intact, call button in reach and RN notified    GOALS:   Multidisciplinary Problems     Occupational Therapy Goals        Problem: Occupational Therapy Goal    Goal Priority Disciplines Outcome Interventions   Occupational Therapy Goal     OT, PT/OT Ongoing, Progressing    Description: Goals to be met by: 3/22/2022     Patient will increase functional independence with ADLs by performing:    Feeding with [place].  UE Dressing with Stand-by Assistance.  LE Dressing with Stand-by Assistance.  Grooming while standing at sink with Set-up Assistance.  Toileting from toilet with Stand-by Assistance for hygiene and clothing management.   Toilet transfer to toilet with Contact Guard Assistance.  Pt able to verbalize and adhere to all spinal precautions 100% of the time.  Pt can don/doff TLSO brace stand-by assist                       Time Tracking:     OT Date of Treatment: 03/11/22  OT Start Time: 0911  OT Stop Time: 0939  OT Total Time (min): 28 min    Additional staff present: N/A    Billable Minutes:  Self Care/Home Management  13  Therapeutic Activity 15      Zonia (Ali), OTR/L  [phone] (Pager #)  3/11/2022

## 2022-03-11 NOTE — PROGRESS NOTES
[facility] - Neurosurgery ([facility])  Neurosurgery  Progress Note    Subjective:     History of Present Illness: Rachel Zazueta is a 41 y.o.  female with PMHx of Hepatitis C, liver cirrhosis, pancytopenia, and polysubstance abuse, who presents to clinic for follow up with new imaging s/p L3 and L4 laminectomy for evacuation of epidural abscess and L3-L5 TLIF on 04/16/2021.     The pt c/o worsening back and left leg pain since October. She states that she is no longer using IV drugs. States only using marijuana. Describes the left leg pain as a shock down the leg when standing for 10 minutes. Denies taking any antibiotics. Endorses new weakness in the legs. Denies b/b dysfunction. Denies new neck pain. She ambulates with a walker at home. States that she smoke.    She presents to ED as direct admit from clinic.       Post-Op Info:  Procedure(s) (LRB):  FUSION, SPINE, LUMBAR (Bilateral)   9 Days Post-Op     Interval History: NAEON. AFVSS. Platelets 48, hemoglobin 7.7 this am s/p platelet and blood transfusions yesterday. Patient chronically pancytopenic. F/u with hematology arranged and patient started on ferrous sulfate daily. Asymptomatic today. Neuro exam is stable. F/u appointments with neurosurgery arranged and plan of care discussed with patient. Pending authorization for rehab.     Medications:  Continuous Infusions:   sodium chloride 0.9% Stopped (03/03/22 5368)     Scheduled Meds:   acetaminophen  1,000 mg Oral Q6H    alteplase  2 mg Intra-Catheter Once    cefTRIAXone (ROCEPHIN) IVPB  2 g Intravenous Q12H    ferrous sulfate  1 tablet Oral Daily    heparin (porcine)  5,000 Units Subcutaneous Q8H    LIDOcaine  1 patch Transdermal Q24H    miconazole NITRATE 2 %   Topical (Top) BID    pantoprazole  40 mg Oral Daily    pregabalin  75 mg Oral TID    senna-docusate 8.6-50 mg  1 tablet Oral Daily    sertraline  50 mg Oral Daily    sodium chloride 0.9%  10 mL Intravenous Q6H     PRN Meds:sodium  chloride, sodium chloride, sodium chloride, aluminum-magnesium hydroxide-simethicone, dextrose 10%, dextrose 10%, glucagon (human recombinant), glucose, glucose, HYDROmorphone, melatonin, oxyCODONE, oxyCODONE, polyethylene glycol, Flushing PICC Protocol **AND** sodium chloride 0.9% **AND** sodium chloride 0.9%     Objective:     Weight: 133.8 kg (294 lb 15.6 oz)  Body mass index is 46.2 kg/m².  Vital Signs (Most Recent):  Temp: 98.9 °F (37.2 °C) (03/11/22 1158)  Pulse: 93 (03/11/22 1158)  Resp: 18 (03/11/22 1158)  BP: (!) 123/58 (03/11/22 1158)  SpO2: 99 % (03/11/22 1158)   Vital Signs (24h Range):  Temp:  [97.5 °F (36.4 °C)-98.9 °F (37.2 °C)] 98.9 °F (37.2 °C)  Pulse:  [85-95] 93  Resp:  [16-20] 18  SpO2:  [95 %-100 %] 99 %  BP: ()/(53-76) 123/58       Neurosurgery Physical Exam  General: Well developed, well nourished, not in acute distress.   Head: Normocephalic, atraumatic.  Neck: Full ROM.   Neurologic: Alert and oriented x 4. Thought content appropriate.  GCS: Motor:6  Verbal:5  Eyes:4   GCS Total: 15  Language: No aphasia.  Speech: No dysarthria.  Cranial nerves: Face symmetric, tongue midline, CN II-XII grossly intact.   Eyes: Pupils equal, round, reactive to light with accomodation, EOMI.   Pulmonary: Normal respirations, no signs of respiratory distress.  Abdomen: Soft, non-distended, non-tender to palpation.  Sensory: Intact to light touch throughout.  Motor Strength: Moves all extremities spontaneously with good tone. No abnormal movements seen.      Strength   Deltoids Triceps Biceps Wrist Extension Wrist Flexion Hand    Upper: R 5/5 5/5 5/5 5/5 5/5 5/5     L 5/5 5/5 5/5 5/5 5/5 5/5       Hip Flexion Knee Extension Knee  Flexion Dorsiflexion Plantar flexion EHL   Lower: R 5/5 5/5 5/5 5/5 5/5 5/5     L 5/5 5/5 5/5 5/5 5/5 5/5      Parker: Absent.  Clonus: 3 beats bilaterally.  Vascular: Bilateral LE pitting edema.   Skin: Skin is warm, dry and intact.     Posterior lumbar incision c/d/i with  staples. No surrounding erythema or edema. No drainage from incision. No palpable hematoma or underlying fluid collection.     HV drain sites covered with gauze and tegaderm. No drainage.     Significant Labs:  Recent Labs   Lab 03/10/22  0449 03/11/22  0126   GLU 92 85    143   K 3.8 3.8    109   CO2 27 26   BUN 17 15   CREATININE 0.6 0.6   CALCIUM 7.7* 7.9*     Recent Labs   Lab 03/10/22  0449 03/11/22  0126   WBC 1.15* 0.95*   HGB 7.2* 7.7*   HCT 23.8* 25.8*   PLT 47* 48*     Recent Labs   Lab 03/10/22  0605 03/11/22  0126   INR 1.3* 1.2     Microbiology Results (last 7 days)       Procedure Component Value Units Date/Time    Fungus culture [[id number]] Collected: 03/02/22 1228    Order Status: Completed Specimen: Back Updated: 03/09/22 1120     Fungus (Mycology) Culture Culture in progress    Narrative:      L5/S1 DISC    Fungus culture [[id number]] Collected: 02/21/22 1539    Order Status: Completed Specimen: Abscess from Back Updated: 03/09/22 0911     Fungus (Mycology) Culture Culture in progress      No fungus isolated after 2 weeks    Narrative:      Epidural purulence #1    Fungus culture [[id number]] Collected: 02/21/22 1539    Order Status: Completed Specimen: Abscess from Back Updated: 03/09/22 0911     Fungus (Mycology) Culture Culture in progress      No fungus isolated after 2 weeks    Narrative:      Epidural purulence #2    Culture, Anaerobe [[id number]] Collected: 03/02/22 1228    Order Status: Completed Specimen: Back Updated: 03/09/22 0729     Anaerobic Culture No anaerobes isolated    Narrative:      L5/S1 DISC    Aerobic culture [[id number]] Collected: 03/02/22 1228    Order Status: Completed Specimen: Back Updated: 03/05/22 1108     Aerobic Bacterial Culture No growth    Narrative:      L5/S1 DISC          All pertinent labs from the last 24 hours have been reviewed.    Significant Diagnostics:  I have reviewed and interpreted all pertinent imaging results/findings within the past 24  hours.    Assessment/Plan:     * Spondylodiscitis  Rachel Zazueta is a 41 F with PMH hepatitis C, cirrhosis, pancytopenia, nicotine abuse, hx IVDU (last use 2 years ago), strep agalactae bacteremia, s/p L3-5 TLIF on 4/16/2021 who presents with persistent and progressive lumbar spondylodiscitis with resultant hardware failure and new scoliosis.     S/p hardware removal on 2/21. Now s/p T10 t pelvis posterior instrumented fusion with L5-S1 TLIF on 3/2.     Plan:  --Patient is floor status.       -q4h neurochecks.  --Post op scoliosis obtained with good hardware placement.  --All labs and diagnostics personally reviewed.  --Follow-up MRI Brain, C/T/L w/wo contrast:   -No evidence of osteomyelitis, discitis, or epidural abscess within the cervical or thoracic spine   -No diffusion positive or abnormal enhancement within the brain   -MRI brain shows patchy nonspecific increased T2 and FLAIR signal in the periventricular and subcortical white matter bilaterally. Could be due to chronic microvascular ischemic changes vs demyelinating disease given patient's age. No evidence of active demyelination.   -DDD C4-7 with mild to moderate canal stenosis  --Full infectious w/u.   -Inflammatory markers wnl. BCx 2/14 NGTD, UA/Ucx pending. Wound Cx from 2/21 neg. Wound Cx from 3/2 pending.    -F/u ID recs - now on Rocephin x 8 weeks. PICC placed.    -Will need placement for IV abx therapy given h/o of IVDU.  --S/p L3-4 disc biopsy and L3 bone biopsy with IR 2/15 - Gram stain rare WBC. Cx NGTD.  --Utox + for amphetamines, addiction psych consulted per .   --Hold anti-plt/coag medications.  --Monitor for urinary retention - voiding spontaneously. Bladder scan prn.  --Pain control: Oxy 10 q4h PRN for moderate pain, Oxy 20 q4h PRN for severe pain, Dilaudid 1 mg q4h PRN when pain not relieved by PO meds weaned to off. Continue lyrica 50 mg TID.  --Drains removed.   --Plt 48 this am; goal > 80k. s/p 1u platelets yesterday.   --H&H  7.7. s/p 1u pRBCs yesterday. Asymptomatic.   --F/u with hematology arranged for pancytopenia.   --Lasix 40 mg IV given for LE edema. Will repeat today. Self reports home Lasix 20 mg daily. Patient will need f/u with PCP for continued management. Denies shortness of breath, chest pain, calf pain.  --DVT ppx: TEDs/SCDs/SQH. BLE US on 3/9 negative.  --Bowel regimen: Miralax, senna. + BM.  --Activity: PT/OT OOB, TLSO brace when OOB.  --Continue to monitor clinically, notify NSGY immediately with any changes in neuro status.    Discussed with Dr. Templeton.    Dispo: Pending rehab authorization.        Afua Campbell PA-C  Neurosurgery  [facility] - Neurosurgery ([facility])

## 2022-03-11 NOTE — PLAN OF CARE
[facility]    FACILITY TRANSFER ORDERS      Patient Name: Rachel Zazueta  YOB: 1980    PCP: Dannielle Merino DO   PCP Address: [address] [place] [address] / [place] [address]  PCP Phone Number: [phone]  PCP Fax: [phone]    Encounter Date: 03/11/2022    Admit to: Rehab    Vital Signs:  Routine    Diagnoses:   Active Hospital Problems    Diagnosis  POA    *Spondylodiscitis [M46.40]  Yes    Vaginal itching [N89.8]  No    Discitis of lumbar region [M46.46]  Yes    Coagulopathy [D68.9]  Yes    Hyperbilirubinemia [E80.6]  Yes    Mild episode of recurrent major depressive disorder [F33.0]  Yes     Chronic    Amphetamine use disorder, severe [F15.20]  Yes    Cannabis use disorder, moderate, dependence [F12.20]  Yes    Pre-op evaluation [Z01.818]  Not Applicable    Thrombocytopenia [D69.6]  Yes    Tobacco use disorder [F17.200]  Yes    Chronic hepatitis C with cirrhosis [B18.2, K74.60]  Yes    Cirrhosis of liver without ascites [K74.60]  Yes    Pancytopenia [D61.818]  Yes     Since 2017 has been told she is anemic with low blood count requiring 3 weeks long at [facility] with 4U pRBCs at the time. August 2018, patient had gone into the ED for a small cut that did not stop bleeding. Then this past April 2021 required 2U of platelets to undergo back surgery. Since then she has not had a follow up evaluation with hematologist. She is always tired and can sleep all day long and can function for couple hours at a time, which she has attributed to depression. Currently using a walker at home to prevent from falls. Last echocardiogram in April 2021, left ventricle is normal in size with normal systolic function. LVEF 60%.            Resolved Hospital Problems   No resolved problems to display.       Allergies:  Review of patient's allergies indicates:   Allergen Reactions    Bee venom protein (honey bee) Anaphylaxis     Patient reports she is allergic to bee stings.     Wasp sting [allergen ext-venom-honey bee] Anaphylaxis    Adhesive Blisters    Strawberry Hives     Patient reports itching and hives    Iodine and iodide containing products Hives    Naproxen Other (See Comments)     Throat closing    Shellfish containing products     Nuts [tree nut] Rash       Diet: regular diet    Activities: Activity as tolerated    Nursing: fall precautions     Labs: Outpatient Weekly Labs:     Order the following labs to be drawn on Mondays:   · CBC  · CMP   · CRP     If vancomycin trough is not at target (15-20) prior to discharge, schedule vancomycin trough to be drawn before their fourth outpatient dose.     For first week, recommend CBC daily due to low hemoglobin counts during hospital stay.    Fax Lab Results to Infectious Diseases Provider: Dr. Bellamy     [facility] Fax Number: [phone]    CONSULTS:    Physical Therapy to evaluate and treat.  and Occupational Therapy to evaluate and treat.    MISCELLANEOUS CARE:    Facility provider to assess ongoing need for subcutaneous heparin for DVT prophylaxis.    PICC line     Discharge Antibiotics:     Intravenous antibiotics:  · Ceftriaxone 2 grams IV q 12 hours     Last dose: 0139              Next dose: 1400     Therapy Duration:  6 weeks     Estimated end date of IV antibiotics: April 13    WOUND CARE ORDERS  Yes: Surgical Wound:  Location: Posterior lumbar incision    Nurse to assess incision for appropriate wound healing. If patient is unable to transport to our clinic for a post op wound check then the rehab nurse is to remove staples sutures on 3/16 if healed appropriately. For questions and concerns regarding surgical wound, please call [facility] Neurosurgery office at [phone]. Please send our clinic a picture of the incision as well.    Wound Care:  No bandage required. Keep the incision open to the air.  Patient may shower on the 2nd day after surgery. Keep the incision clean and dry at all times when not showering. Cover  the incision while showering and REMOVE bandage once shower is completed. Do not allow the force of water to hit the incision. If the incision gets damp, pat it dry. Do not rub or scrub the incision.  Do not take a bath or submerge the incision in water until 8 weeks after surgery.  The incision does not need to be cleaned with any water, soap, alcohol, peroxide, or other substance.      Patient will have appointments with Neurosurgery, Infectious disease as well as Hematology. Please provide transport to these appointments. Thank you.    Medications: Review discharge medications with patient and family and provide education.      Current Discharge Medication List      START taking these medications    Details   cefTRIAXone (ROCEPHIN) 2 g/50 mL PgBk IVPB Inject 50 mLs (2 g total) into the vein every 12 (twelve) hours.      ferrous sulfate 325 (65 FE) MG EC tablet Take 1 tablet (325 mg total) by mouth once daily.  Refills: 0      oxyCODONE (ROXICODONE) 20 mg Tab immediate release tablet Take 1 tablet (20 mg total) by mouth every 4 (four) hours as needed for Pain.  Refills: 0    Comments: Quantity prescribed more than 7 day supply? Yes, quantity medically necessary         CONTINUE these medications which have CHANGED    Details   pregabalin (LYRICA) 75 MG capsule Take 1 capsule (75 mg total) by mouth 3 (three) times daily.  Qty: 90 capsule, Refills: 6         CONTINUE these medications which have NOT CHANGED    Details   polyethylene glycol (GLYCOLAX) 17 gram/dose powder Take 17 g by mouth once daily.  Qty: 510 g, Refills: 0    Associated Diagnoses: Constipation, unspecified constipation type      sertraline (ZOLOFT) 25 MG tablet Take 1 tablet (25 mg total) by mouth once daily.  Qty: 30 tablet, Refills: 0    Associated Diagnoses: Moderate episode of recurrent major depressive disorder         STOP taking these medications       diclofenac sodium (VOLTAREN) 1 % Gel Comments:   Reason for Stopping:                   Immunizations Administered as of 3/11/2022     Name Date Dose VIS Date Route Exp Date    COVID-19, MRNA, LN-S, PF (Pfizer) (Purple Cap) 4/30/2021  3:46 PM 0.3 mL 12/12/2020 Intramuscular 6/30/2021    Site: Left deltoid     Given By: Ritika Conway RN     : [employer]     Lot: [id number]           This patient has had both covid vaccinations    Some patients may experience side effects after vaccination.  These may include fever, headache, muscle or joint aches.  Most symptoms resolve with 24-48 hours and do not require urgent medical evaluation unless they persist for more than 72 hours or symptoms are concerning for an unrelated medical condition.          _________________________________  Afua Campbell PA-C  03/11/2022

## 2022-03-11 NOTE — PROGRESS NOTES
[facility] Neurosurgery ([facility])  [facility] Medicine  Progress Note    Patient Name: Rachel Zazueta  MRN: [id number]  Patient Class: IP- Inpatient   Admission Date: 2/14/2022  Length of Stay: 25 days  Attending Physician: Guille Templeton MD  Primary Care Provider: Dannielle Merino DO        Subjective:     Principal Problem:Spondylodiscitis        HPI:  Rachel Zazueta is a 40 y/o F with a PMH of cirrhosis, Hep C, anemia, polysubstance abuse, and epidural abscess s/p L3-L4 laminectomy and L3-L5 TLIF (4/2021; blood and surgical cultures positive for group B strep) who was admitted from NS clinic for workup of severe back pain and LE weakness. She reports that she has had worsening pain in her lower back and hips since October. She also reports shooting pain down her left leg. She ambulates with a walker at home due to the pain and difficulty with balance. She denies bowel or bladder incontinence. She denies CP, SOB, nausea, vomiting. Denies hx of HTN or DM. She was lost to follow up after her previous surgery, stating that she did not go to her appointments because she was feeling good. She states that her last IV meth use was 8/2020, and last meth use was after her previous surgery 4/2021 (however tox screen 7/2021 positive for amphetamines). She reports marijuana use approx 3x per week and smokes cigarettes 1/2 pack per day. She denies use of other substances including cocaine. MRI C/T/L spine showed suspected discitis/osteomyelitis at L3-4 and L4-5 with probable hardware infection. L3-4 disc biopsy obtained by IR. Hospital medicine was consulted for preop risk stratification and medical optimization.       Overview/Hospital Course:  RCRI 0. TTE with EF 60%, no endocarditis.  ID following for abx recs, started empiric vanc and ceftriaxone. Bcx NGTD. L3-4 intervertebral disc biopsy with IR, cx negative so far. Utox positive for amphetamines and methadone metabolites. Addiction psych consulted.        Interval History: NAEON. Pt feels well after surgery 3/2. Working with PT/OT and waiting for rehab placement today with worsening pancytopenia, but denies any bleeding.     Objective:     Weight: 133.8 kg (294 lb 15.6 oz)  Body mass index is 46.2 kg/m².  Vital Signs (Most Recent):  Temp: 98.9 °F (37.2 °C) (03/11/22 1158)  Pulse: 93 (03/11/22 1158)  Resp: 18 (03/11/22 1158)  BP: (!) 123/58 (03/11/22 1158)  SpO2: 99 % (03/11/22 1158)   Vital Signs (24h Range):  Temp:  [97.5 °F (36.4 °C)-98.9 °F (37.2 °C)] 98.9 °F (37.2 °C)  Pulse:  [85-95] 93  Resp:  [16-20] 18  SpO2:  [95 %-100 %] 99 %  BP: ()/(53-76) 123/58       Neurosurgery Physical Exam  General: Well developed, well nourished, not in acute distress.   Head: Normocephalic, atraumatic.  Neck: Full ROM.   Neurologic: Alert and oriented x 4. Thought content appropriate.  GCS: Motor:6  Verbal:5  Eyes:4   GCS Total: 15  Language: No aphasia.  Speech: No dysarthria.  Cranial nerves: Face symmetric, tongue midline, CN II-XII grossly intact.   Eyes: Pupils equal, round, reactive to light with accomodation, EOMI.   Pulmonary: Normal respirations, no signs of respiratory distress.  Abdomen: Soft, non-distended, non-tender to palpation.  Sensory: Intact to light touch throughout.  Motor Strength: Moves all extremities spontaneously with good tone. No abnormal movements seen.      Strength   Deltoids Triceps Biceps Wrist Extension Wrist Flexion Hand    Upper: R 5/5 5/5 5/5 5/5 5/5 5/5     L 5/5 5/5 5/5 5/5 5/5 5/5       Hip Flexion Knee Extension Knee  Flexion Dorsiflexion Plantar flexion EHL   Lower: R 5/5 5/5 5/5 5/5 5/5 5/5     L 5/5 5/5 5/5 5/5 5/5 5/5      Parker: Absent.  Clonus: 3 beats bilaterally.  Vascular: Bilateral LE pitting edema.   Skin: Skin is warm, dry and intact.     Posterior lumbar incision c/d/i with staples. No surrounding erythema or edema. No drainage from incision. No palpable hematoma or underlying fluid collection.     HV drain  sites covered with gauze and tegaderm. No drainage.     Significant Labs:  Recent Labs   Lab 03/10/22  0449 03/11/22  0126   GLU 92 85    143   K 3.8 3.8    109   CO2 27 26   BUN 17 15   CREATININE 0.6 0.6   CALCIUM 7.7* 7.9*     Recent Labs   Lab 03/10/22  0449 03/11/22  0126   WBC 1.15* 0.95*   HGB 7.2* 7.7*   HCT 23.8* 25.8*   PLT 47* 48*     Recent Labs   Lab 03/10/22  0605 03/11/22  0126   INR 1.3* 1.2     Microbiology Results (last 7 days)       Procedure Component Value Units Date/Time    Fungus culture [[id number]] Collected: 03/02/22 1228    Order Status: Completed Specimen: Back Updated: 03/09/22 1120     Fungus (Mycology) Culture Culture in progress    Narrative:      L5/S1 DISC    Fungus culture [[id number]] Collected: 02/21/22 1539    Order Status: Completed Specimen: Abscess from Back Updated: 03/09/22 0911     Fungus (Mycology) Culture Culture in progress      No fungus isolated after 2 weeks    Narrative:      Epidural purulence #1    Fungus culture [[id number]] Collected: 02/21/22 1539    Order Status: Completed Specimen: Abscess from Back Updated: 03/09/22 0911     Fungus (Mycology) Culture Culture in progress      No fungus isolated after 2 weeks    Narrative:      Epidural purulence #2    Culture, Anaerobe [[id number]] Collected: 03/02/22 1228    Order Status: Completed Specimen: Back Updated: 03/09/22 0729     Anaerobic Culture No anaerobes isolated    Narrative:      L5/S1 DISC    Aerobic culture [[id number]] Collected: 03/02/22 1228    Order Status: Completed Specimen: Back Updated: 03/05/22 1108     Aerobic Bacterial Culture No growth    Narrative:      L5/S1 DISC            Review of Systems  Objective:     Vital Signs (Most Recent):  Temp: 98.9 °F (37.2 °C) (03/11/22 1158)  Pulse: 93 (03/11/22 1158)  Resp: 18 (03/11/22 1158)  BP: (!) 123/58 (03/11/22 1158)  SpO2: 99 % (03/11/22 1158)   Vital Signs (24h Range):  Temp:  [97.5 °F (36.4 °C)-98.9 °F (37.2 °C)] 98.9 °F (37.2 °C)  Pulse:   [85-95] 93  Resp:  [16-20] 18  SpO2:  [95 %-100 %] 99 %  BP: ()/(53-76) 123/58     Weight: 133.8 kg (294 lb 15.6 oz)  Body mass index is 46.2 kg/m².    Intake/Output Summary (Last 24 hours) at 3/11/2022 1425  Last data filed at 3/11/2022 0600  Gross per 24 hour   Intake 1275 ml   Output 1250 ml   Net 25 ml      Physical Exam  Constitutional:       Appearance: Normal appearance.   Cardiovascular:      Rate and Rhythm: Normal rate.   Pulmonary:      Effort: Pulmonary effort is normal. No respiratory distress.      Breath sounds: No wheezing or rales.   Abdominal:      General: There is no distension.      Palpations: Abdomen is soft.      Tenderness: There is no abdominal tenderness.   Musculoskeletal:         General: Swelling present.      Right lower leg: Edema present.      Left lower leg: Edema present.      Comments: She has a brace on and was able to walk in the room during the exam    Skin:     Capillary Refill: Capillary refill takes more than 3 seconds.      Coloration: Skin is not jaundiced.      Findings: No lesion or rash.   Neurological:      Mental Status: She is alert and oriented to person, place, and time.       Significant Labs: All pertinent labs within the past 24 hours have been reviewed.    Significant Imaging: I have reviewed all pertinent imaging results/findings within the past 24 hours.      Assessment/Plan:      * Spondylodiscitis  Hx of epidural abscess 4/2021 with blood and surgical cultures positive for group B strep.  Echo 2/15 negative for endocarditis. ID following for antibiotic recs.     - S/p washout, hardware removal, extension of fusion 2/21 and reinstrumentation on 3/2  - PICC placed  - pending placement for rehab and to continue IV abx       Hyperbilirubinemia  Tbili 2.1 on admission.     Recommendations:  - daily CMP   - Tbili trending down     Coagulopathy        Tobacco use disorder  Reports currently smoking 1/2 ppd.          Thrombocytopenia  Chronic, likely due to  cirrhosis.     Recommendations:  - daily CBC      Chronic hepatitis C with cirrhosis  PMH cirrhosis 2/2 HCV. Pt denies ever receiving treatment for HCV. Pt reports previous liver biopsy, but no records of this available. She has been referred to hepatology previously but did not follow up.   MELD-Na score: 10 at 3/11/2022  1:26 AM  MELD score: 10 at 3/11/2022  1:26 AM  Calculated from:  Serum Creatinine: 0.6 mg/dL (Using min of 1 mg/dL) at 3/11/2022  1:26 AM  Serum Sodium: 143 mmol/L (Using max of 137 mmol/L) at 3/11/2022  1:26 AM  Total Bilirubin: 1.5 mg/dL at 3/11/2022  1:26 AM  INR(ratio): 1.2 at 3/11/2022  1:26 AM  Age: 41 years;    Recommendations:  - U/S abdomen with doppler showed cirrhosis and portal hypertension satisfactory Doppler evaluation   - daily CMP and INR  - will need hepatology referral on discharge     Pancytopenia  Hx of bone marrow biopsy 2017 that was nondiagnostic.   She has an appointment with hem/onc this month 3/22  Recommendations:  - daily labs  - transfuse for Hgb <7  - transfuse for plt <10 or <50 with bleeding        VTE Risk Mitigation (From admission, onward)         Ordered     heparin (porcine) injection 5,000 Units  Every 8 hours         03/09/22 1027     heparin (porcine) injection 5,000 Units  Every 8 hours         02/22/22 0914     heparin (porcine) injection 7,500 Units  Every 8 hours         02/18/22 1353     IP VTE HIGH RISK PATIENT  Once         02/14/22 1936     Place sequential compression device  Until discontinued         02/14/22 1936                Discharge Planning   SHIRA: 3/11/2022     Code Status: Full Code   Is the patient medically ready for discharge?:     Reason for patient still in hospital (select all that apply): Treatment  Discharge Plan A: Rehab   Discharge Delays: None known at this time              Simi Argueta MD  Department of Hospital Medicine   [facility] - Neurosurgery ([facility])

## 2022-03-11 NOTE — ASSESSMENT & PLAN NOTE
Hx of bone marrow biopsy 2017 that was nondiagnostic.   She has an appointment with hem/onc this month 3/22  Recommendations:  - daily labs  - transfuse for Hgb <7  - transfuse for plt <10 or <50 with bleeding

## 2022-03-11 NOTE — SUBJECTIVE & OBJECTIVE
Interval History: NAEON. AFVSS. Platelets 48, hemoglobin 7.7 this am s/p platelet and blood transfusions yesterday. Patient chronically pancytopenic. F/u with hematology arranged and patient started on ferrous sulfate daily. Asymptomatic today. Neuro exam is stable. F/u appointments with neurosurgery arranged and plan of care discussed with patient. Pending authorization for rehab.     Medications:  Continuous Infusions:   sodium chloride 0.9% Stopped (03/03/22 1708)     Scheduled Meds:   acetaminophen  1,000 mg Oral Q6H    alteplase  2 mg Intra-Catheter Once    cefTRIAXone (ROCEPHIN) IVPB  2 g Intravenous Q12H    ferrous sulfate  1 tablet Oral Daily    heparin (porcine)  5,000 Units Subcutaneous Q8H    LIDOcaine  1 patch Transdermal Q24H    miconazole NITRATE 2 %   Topical (Top) BID    pantoprazole  40 mg Oral Daily    pregabalin  75 mg Oral TID    senna-docusate 8.6-50 mg  1 tablet Oral Daily    sertraline  50 mg Oral Daily    sodium chloride 0.9%  10 mL Intravenous Q6H     PRN Meds:sodium chloride, sodium chloride, sodium chloride, aluminum-magnesium hydroxide-simethicone, dextrose 10%, dextrose 10%, glucagon (human recombinant), glucose, glucose, HYDROmorphone, melatonin, oxyCODONE, oxyCODONE, polyethylene glycol, Flushing PICC Protocol **AND** sodium chloride 0.9% **AND** sodium chloride 0.9%     Objective:     Weight: 133.8 kg (294 lb 15.6 oz)  Body mass index is 46.2 kg/m².  Vital Signs (Most Recent):  Temp: 98.9 °F (37.2 °C) (03/11/22 1158)  Pulse: 93 (03/11/22 1158)  Resp: 18 (03/11/22 1158)  BP: (!) 123/58 (03/11/22 1158)  SpO2: 99 % (03/11/22 1158)   Vital Signs (24h Range):  Temp:  [97.5 °F (36.4 °C)-98.9 °F (37.2 °C)] 98.9 °F (37.2 °C)  Pulse:  [85-95] 93  Resp:  [16-20] 18  SpO2:  [95 %-100 %] 99 %  BP: ()/(53-76) 123/58       Neurosurgery Physical Exam  General: Well developed, well nourished, not in acute distress.   Head: Normocephalic, atraumatic.  Neck: Full ROM.   Neurologic: Alert and  oriented x 4. Thought content appropriate.  GCS: Motor:6  Verbal:5  Eyes:4   GCS Total: 15  Language: No aphasia.  Speech: No dysarthria.  Cranial nerves: Face symmetric, tongue midline, CN II-XII grossly intact.   Eyes: Pupils equal, round, reactive to light with accomodation, EOMI.   Pulmonary: Normal respirations, no signs of respiratory distress.  Abdomen: Soft, non-distended, non-tender to palpation.  Sensory: Intact to light touch throughout.  Motor Strength: Moves all extremities spontaneously with good tone. No abnormal movements seen.      Strength   Deltoids Triceps Biceps Wrist Extension Wrist Flexion Hand    Upper: R 5/5 5/5 5/5 5/5 5/5 5/5     L 5/5 5/5 5/5 5/5 5/5 5/5       Hip Flexion Knee Extension Knee  Flexion Dorsiflexion Plantar flexion EHL   Lower: R 5/5 5/5 5/5 5/5 5/5 5/5     L 5/5 5/5 5/5 5/5 5/5 5/5      Parker: Absent.  Clonus: 3 beats bilaterally.  Vascular: Bilateral LE pitting edema.   Skin: Skin is warm, dry and intact.     Posterior lumbar incision c/d/i with staples. No surrounding erythema or edema. No drainage from incision. No palpable hematoma or underlying fluid collection.     HV drain sites covered with gauze and tegaderm. No drainage.     Significant Labs:  Recent Labs   Lab 03/10/22  0449 03/11/22  0126   GLU 92 85    143   K 3.8 3.8    109   CO2 27 26   BUN 17 15   CREATININE 0.6 0.6   CALCIUM 7.7* 7.9*     Recent Labs   Lab 03/10/22  0449 03/11/22  0126   WBC 1.15* 0.95*   HGB 7.2* 7.7*   HCT 23.8* 25.8*   PLT 47* 48*     Recent Labs   Lab 03/10/22  0605 03/11/22  0126   INR 1.3* 1.2     Microbiology Results (last 7 days)       Procedure Component Value Units Date/Time    Fungus culture [[id number]] Collected: 03/02/22 1228    Order Status: Completed Specimen: Back Updated: 03/09/22 1120     Fungus (Mycology) Culture Culture in progress    Narrative:      L5/S1 DISC    Fungus culture [[id number]] Collected: 02/21/22 1539    Order Status: Completed Specimen:  Abscess from Back Updated: 03/09/22 0911     Fungus (Mycology) Culture Culture in progress      No fungus isolated after 2 weeks    Narrative:      Epidural purulence #1    Fungus culture [[id number]] Collected: 02/21/22 1539    Order Status: Completed Specimen: Abscess from Back Updated: 03/09/22 0911     Fungus (Mycology) Culture Culture in progress      No fungus isolated after 2 weeks    Narrative:      Epidural purulence #2    Culture, Anaerobe [[id number]] Collected: 03/02/22 1228    Order Status: Completed Specimen: Back Updated: 03/09/22 0729     Anaerobic Culture No anaerobes isolated    Narrative:      L5/S1 DISC    Aerobic culture [[id number]] Collected: 03/02/22 1228    Order Status: Completed Specimen: Back Updated: 03/05/22 1108     Aerobic Bacterial Culture No growth    Narrative:      L5/S1 DISC          All pertinent labs from the last 24 hours have been reviewed.    Significant Diagnostics:  I have reviewed and interpreted all pertinent imaging results/findings within the past 24 hours.

## 2022-03-11 NOTE — ASSESSMENT & PLAN NOTE
Rachel Zazueta is a 41 F with PMH hepatitis C, cirrhosis, pancytopenia, nicotine abuse, hx IVDU (last use 2 years ago), strep agalactae bacteremia, s/p L3-5 TLIF on 4/16/2021 who presents with persistent and progressive lumbar spondylodiscitis with resultant hardware failure and new scoliosis.     S/p hardware removal on 2/21. Now s/p T10 t pelvis posterior instrumented fusion with L5-S1 TLIF on 3/2.     Plan:  --Patient is floor status.       -q4h neurochecks.  --Post op scoliosis obtained with good hardware placement.  --All labs and diagnostics personally reviewed.  --Follow-up MRI Brain, C/T/L w/wo contrast:   -No evidence of osteomyelitis, discitis, or epidural abscess within the cervical or thoracic spine   -No diffusion positive or abnormal enhancement within the brain   -MRI brain shows patchy nonspecific increased T2 and FLAIR signal in the periventricular and subcortical white matter bilaterally. Could be due to chronic microvascular ischemic changes vs demyelinating disease given patient's age. No evidence of active demyelination.   -DDD C4-7 with mild to moderate canal stenosis  --Full infectious w/u.   -Inflammatory markers wnl. BCx 2/14 NGTD, UA/Ucx pending. Wound Cx from 2/21 neg. Wound Cx from 3/2 pending.    -F/u ID recs - now on Rocephin x 8 weeks. PICC placed.    -Will need placement for IV abx therapy given h/o of IVDU.  --S/p L3-4 disc biopsy and L3 bone biopsy with IR 2/15 - Gram stain rare WBC. Cx NGTD.  --Utox + for amphetamines, addiction psych consulted per .   --Hold anti-plt/coag medications.  --Monitor for urinary retention - voiding spontaneously. Bladder scan prn.  --Pain control: Oxy 10 q4h PRN for moderate pain, Oxy 20 q4h PRN for severe pain, Dilaudid 1 mg q4h PRN when pain not relieved by PO meds weaned to off. Continue lyrica 50 mg TID.  --Drains removed.   --Plt 48 this am; goal > 80k. s/p 1u platelets yesterday.   --H&H 7.7. s/p 1u pRBCs yesterday. Asymptomatic.   --F/u  with hematology arranged for pancytopenia.   --Lasix 40 mg IV given for LE edema. Will repeat today. Self reports home Lasix 20 mg daily. Patient will need f/u with PCP for continued management. Denies shortness of breath, chest pain, calf pain.  --DVT ppx: TEDs/SCDs/SQH. BLE US on 3/9 negative.  --Bowel regimen: Miralax, senna. + BM.  --Activity: PT/OT OOB, TLSO brace when OOB.  --Continue to monitor clinically, notify NSGY immediately with any changes in neuro status.    Discussed with Dr. Templeton.    Dispo: Pending rehab authorization.

## 2022-03-11 NOTE — SUBJECTIVE & OBJECTIVE
Interval History: NAEON. Pt feels well after surgery 3/2. Working with PT/OT and waiting for rehab placement today with worsening pancytopenia, but denies any bleeding.     Objective:     Weight: 133.8 kg (294 lb 15.6 oz)  Body mass index is 46.2 kg/m².  Vital Signs (Most Recent):  Temp: 98.9 °F (37.2 °C) (03/11/22 1158)  Pulse: 93 (03/11/22 1158)  Resp: 18 (03/11/22 1158)  BP: (!) 123/58 (03/11/22 1158)  SpO2: 99 % (03/11/22 1158)   Vital Signs (24h Range):  Temp:  [97.5 °F (36.4 °C)-98.9 °F (37.2 °C)] 98.9 °F (37.2 °C)  Pulse:  [85-95] 93  Resp:  [16-20] 18  SpO2:  [95 %-100 %] 99 %  BP: ()/(53-76) 123/58       Neurosurgery Physical Exam  General: Well developed, well nourished, not in acute distress.   Head: Normocephalic, atraumatic.  Neck: Full ROM.   Neurologic: Alert and oriented x 4. Thought content appropriate.  GCS: Motor:6  Verbal:5  Eyes:4   GCS Total: 15  Language: No aphasia.  Speech: No dysarthria.  Cranial nerves: Face symmetric, tongue midline, CN II-XII grossly intact.   Eyes: Pupils equal, round, reactive to light with accomodation, EOMI.   Pulmonary: Normal respirations, no signs of respiratory distress.  Abdomen: Soft, non-distended, non-tender to palpation.  Sensory: Intact to light touch throughout.  Motor Strength: Moves all extremities spontaneously with good tone. No abnormal movements seen.      Strength   Deltoids Triceps Biceps Wrist Extension Wrist Flexion Hand    Upper: R 5/5 5/5 5/5 5/5 5/5 5/5     L 5/5 5/5 5/5 5/5 5/5 5/5       Hip Flexion Knee Extension Knee  Flexion Dorsiflexion Plantar flexion EHL   Lower: R 5/5 5/5 5/5 5/5 5/5 5/5     L 5/5 5/5 5/5 5/5 5/5 5/5      Parker: Absent.  Clonus: 3 beats bilaterally.  Vascular: Bilateral LE pitting edema.   Skin: Skin is warm, dry and intact.     Posterior lumbar incision c/d/i with staples. No surrounding erythema or edema. No drainage from incision. No palpable hematoma or underlying fluid collection.     HV drain sites  covered with gauze and tegaderm. No drainage.     Significant Labs:  Recent Labs   Lab 03/10/22  0449 03/11/22  0126   GLU 92 85    143   K 3.8 3.8    109   CO2 27 26   BUN 17 15   CREATININE 0.6 0.6   CALCIUM 7.7* 7.9*     Recent Labs   Lab 03/10/22  0449 03/11/22  0126   WBC 1.15* 0.95*   HGB 7.2* 7.7*   HCT 23.8* 25.8*   PLT 47* 48*     Recent Labs   Lab 03/10/22  0605 03/11/22  0126   INR 1.3* 1.2     Microbiology Results (last 7 days)       Procedure Component Value Units Date/Time    Fungus culture [[id number]] Collected: 03/02/22 1228    Order Status: Completed Specimen: Back Updated: 03/09/22 1120     Fungus (Mycology) Culture Culture in progress    Narrative:      L5/S1 DISC    Fungus culture [[id number]] Collected: 02/21/22 1539    Order Status: Completed Specimen: Abscess from Back Updated: 03/09/22 0911     Fungus (Mycology) Culture Culture in progress      No fungus isolated after 2 weeks    Narrative:      Epidural purulence #1    Fungus culture [[id number]] Collected: 02/21/22 1539    Order Status: Completed Specimen: Abscess from Back Updated: 03/09/22 0911     Fungus (Mycology) Culture Culture in progress      No fungus isolated after 2 weeks    Narrative:      Epidural purulence #2    Culture, Anaerobe [[id number]] Collected: 03/02/22 1228    Order Status: Completed Specimen: Back Updated: 03/09/22 0729     Anaerobic Culture No anaerobes isolated    Narrative:      L5/S1 DISC    Aerobic culture [[id number]] Collected: 03/02/22 1228    Order Status: Completed Specimen: Back Updated: 03/05/22 1108     Aerobic Bacterial Culture No growth    Narrative:      L5/S1 DISC            Review of Systems  Objective:     Vital Signs (Most Recent):  Temp: 98.9 °F (37.2 °C) (03/11/22 1158)  Pulse: 93 (03/11/22 1158)  Resp: 18 (03/11/22 1158)  BP: (!) 123/58 (03/11/22 1158)  SpO2: 99 % (03/11/22 1158)   Vital Signs (24h Range):  Temp:  [97.5 °F (36.4 °C)-98.9 °F (37.2 °C)] 98.9 °F (37.2 °C)  Pulse:  [85-95]  93  Resp:  [16-20] 18  SpO2:  [95 %-100 %] 99 %  BP: ()/(53-76) 123/58     Weight: 133.8 kg (294 lb 15.6 oz)  Body mass index is 46.2 kg/m².    Intake/Output Summary (Last 24 hours) at 3/11/2022 1425  Last data filed at 3/11/2022 0600  Gross per 24 hour   Intake 1275 ml   Output 1250 ml   Net 25 ml      Physical Exam  Constitutional:       Appearance: Normal appearance.   Cardiovascular:      Rate and Rhythm: Normal rate.   Pulmonary:      Effort: Pulmonary effort is normal. No respiratory distress.      Breath sounds: No wheezing or rales.   Abdominal:      General: There is no distension.      Palpations: Abdomen is soft.      Tenderness: There is no abdominal tenderness.   Musculoskeletal:         General: Swelling present.      Right lower leg: Edema present.      Left lower leg: Edema present.      Comments: She has a brace on and was able to walk in the room during the exam    Skin:     Capillary Refill: Capillary refill takes more than 3 seconds.      Coloration: Skin is not jaundiced.      Findings: No lesion or rash.   Neurological:      Mental Status: She is alert and oriented to person, place, and time.       Significant Labs: All pertinent labs within the past 24 hours have been reviewed.    Significant Imaging: I have reviewed all pertinent imaging results/findings within the past 24 hours.

## 2022-03-12 LAB
ALBUMIN SERPL BCP-MCNC: 2 G/DL (ref 3.5–5.2)
ALP SERPL-CCNC: 114 U/L (ref 55–135)
ALT SERPL W/O P-5'-P-CCNC: 22 U/L (ref 10–44)
ANION GAP SERPL CALC-SCNC: 6 MMOL/L (ref 8–16)
ANISOCYTOSIS BLD QL SMEAR: SLIGHT
AST SERPL-CCNC: 42 U/L (ref 10–40)
BASOPHILS # BLD AUTO: 0.01 K/UL (ref 0–0.2)
BASOPHILS NFR BLD: 0.9 % (ref 0–1.9)
BILIRUB SERPL-MCNC: 0.8 MG/DL (ref 0.1–1)
BUN SERPL-MCNC: 14 MG/DL (ref 6–20)
CALCIUM SERPL-MCNC: 7.5 MG/DL (ref 8.7–10.5)
CHLORIDE SERPL-SCNC: 110 MMOL/L (ref 95–110)
CO2 SERPL-SCNC: 27 MMOL/L (ref 23–29)
CREAT SERPL-MCNC: 0.6 MG/DL (ref 0.5–1.4)
DIFFERENTIAL METHOD: ABNORMAL
EOSINOPHIL # BLD AUTO: 0.1 K/UL (ref 0–0.5)
EOSINOPHIL NFR BLD: 7.4 % (ref 0–8)
ERYTHROCYTE [DISTWIDTH] IN BLOOD BY AUTOMATED COUNT: 16.4 % (ref 11.5–14.5)
EST. GFR  (AFRICAN AMERICAN): >60 ML/MIN/1.73 M^2
EST. GFR  (NON AFRICAN AMERICAN): >60 ML/MIN/1.73 M^2
GLUCOSE SERPL-MCNC: 103 MG/DL (ref 70–110)
HCT VFR BLD AUTO: 25.7 % (ref 37–48.5)
HGB BLD-MCNC: 7.8 G/DL (ref 12–16)
IMM GRANULOCYTES # BLD AUTO: 0 K/UL (ref 0–0.04)
IMM GRANULOCYTES NFR BLD AUTO: 0 % (ref 0–0.5)
INR PPP: 1.2 (ref 0.8–1.2)
LYMPHOCYTES # BLD AUTO: 0.3 K/UL (ref 1–4.8)
LYMPHOCYTES NFR BLD: 28.7 % (ref 18–48)
MCH RBC QN AUTO: 28.5 PG (ref 27–31)
MCHC RBC AUTO-ENTMCNC: 30.4 G/DL (ref 32–36)
MCV RBC AUTO: 94 FL (ref 82–98)
MONOCYTES # BLD AUTO: 0.1 K/UL (ref 0.3–1)
MONOCYTES NFR BLD: 10.2 % (ref 4–15)
NEUTROPHILS # BLD AUTO: 0.6 K/UL (ref 1.8–7.7)
NEUTROPHILS NFR BLD: 52.8 % (ref 38–73)
NRBC BLD-RTO: 0 /100 WBC
OVALOCYTES BLD QL SMEAR: ABNORMAL
PLATELET # BLD AUTO: 47 K/UL (ref 150–450)
PMV BLD AUTO: 9.6 FL (ref 9.2–12.9)
POIKILOCYTOSIS BLD QL SMEAR: SLIGHT
POLYCHROMASIA BLD QL SMEAR: ABNORMAL
POTASSIUM SERPL-SCNC: 3.8 MMOL/L (ref 3.5–5.1)
PROT SERPL-MCNC: 5.1 G/DL (ref 6–8.4)
PROTHROMBIN TIME: 12.4 SEC (ref 9–12.5)
RBC # BLD AUTO: 2.74 M/UL (ref 4–5.4)
SODIUM SERPL-SCNC: 143 MMOL/L (ref 136–145)
WBC # BLD AUTO: 1.08 K/UL (ref 3.9–12.7)

## 2022-03-12 PROCEDURE — 85025 COMPLETE CBC W/AUTO DIFF WBC: CPT | Performed by: STUDENT IN AN ORGANIZED HEALTH CARE EDUCATION/TRAINING PROGRAM

## 2022-03-12 PROCEDURE — 80053 COMPREHEN METABOLIC PANEL: CPT

## 2022-03-12 PROCEDURE — 99024 POSTOP FOLLOW-UP VISIT: CPT | Mod: ,,, | Performed by: PHYSICIAN ASSISTANT

## 2022-03-12 PROCEDURE — 63600175 PHARM REV CODE 636 W HCPCS

## 2022-03-12 PROCEDURE — C1751 CATH, INF, PER/CENT/MIDLINE: HCPCS

## 2022-03-12 PROCEDURE — A4216 STERILE WATER/SALINE, 10 ML: HCPCS | Performed by: NEUROLOGICAL SURGERY

## 2022-03-12 PROCEDURE — 25000003 PHARM REV CODE 250: Performed by: STUDENT IN AN ORGANIZED HEALTH CARE EDUCATION/TRAINING PROGRAM

## 2022-03-12 PROCEDURE — 97530 THERAPEUTIC ACTIVITIES: CPT | Mod: CQ

## 2022-03-12 PROCEDURE — 25000003 PHARM REV CODE 250: Performed by: NEUROLOGICAL SURGERY

## 2022-03-12 PROCEDURE — 99024 PR POST-OP FOLLOW-UP VISIT: ICD-10-PCS | Mod: ,,, | Performed by: PHYSICIAN ASSISTANT

## 2022-03-12 PROCEDURE — 63600175 PHARM REV CODE 636 W HCPCS: Performed by: PHYSICIAN ASSISTANT

## 2022-03-12 PROCEDURE — 85610 PROTHROMBIN TIME: CPT

## 2022-03-12 PROCEDURE — 10060 I&D ABSCESS SIMPLE/SINGLE: CPT

## 2022-03-12 PROCEDURE — 25000003 PHARM REV CODE 250

## 2022-03-12 PROCEDURE — 97116 GAIT TRAINING THERAPY: CPT | Mod: CQ

## 2022-03-12 PROCEDURE — 11000001 HC ACUTE MED/SURG PRIVATE ROOM

## 2022-03-12 RX ADMIN — ACETAMINOPHEN 1000 MG: 325 TABLET ORAL at 05:03

## 2022-03-12 RX ADMIN — MICONAZOLE NITRATE 2 % TOPICAL POWDER: at 08:03

## 2022-03-12 RX ADMIN — ACETAMINOPHEN 1000 MG: 325 TABLET ORAL at 06:03

## 2022-03-12 RX ADMIN — ACETAMINOPHEN 1000 MG: 325 TABLET ORAL at 12:03

## 2022-03-12 RX ADMIN — CEFTRIAXONE 2 G: 2 INJECTION, POWDER, FOR SOLUTION INTRAMUSCULAR; INTRAVENOUS at 02:03

## 2022-03-12 RX ADMIN — CEFTRIAXONE 2 G: 2 INJECTION, POWDER, FOR SOLUTION INTRAMUSCULAR; INTRAVENOUS at 01:03

## 2022-03-12 RX ADMIN — PANTOPRAZOLE SODIUM 40 MG: 20 TABLET, DELAYED RELEASE ORAL at 09:03

## 2022-03-12 RX ADMIN — Medication 10 ML: at 11:03

## 2022-03-12 RX ADMIN — Medication 10 ML: at 07:03

## 2022-03-12 RX ADMIN — PREGABALIN 75 MG: 75 CAPSULE ORAL at 09:03

## 2022-03-12 RX ADMIN — OXYCODONE HYDROCHLORIDE 20 MG: 10 TABLET ORAL at 10:03

## 2022-03-12 RX ADMIN — HYDROMORPHONE HYDROCHLORIDE 1 MG: 1 INJECTION, SOLUTION INTRAMUSCULAR; INTRAVENOUS; SUBCUTANEOUS at 08:03

## 2022-03-12 RX ADMIN — HYDROMORPHONE HYDROCHLORIDE 1 MG: 1 INJECTION, SOLUTION INTRAMUSCULAR; INTRAVENOUS; SUBCUTANEOUS at 12:03

## 2022-03-12 RX ADMIN — MICONAZOLE NITRATE 2 % TOPICAL POWDER: at 12:03

## 2022-03-12 RX ADMIN — SENNOSIDES AND DOCUSATE SODIUM 1 TABLET: 50; 8.6 TABLET ORAL at 09:03

## 2022-03-12 RX ADMIN — MICONAZOLE NITRATE 2 % TOPICAL POWDER: at 09:03

## 2022-03-12 RX ADMIN — HEPARIN SODIUM 5000 UNITS: 5000 INJECTION INTRAVENOUS; SUBCUTANEOUS at 11:03

## 2022-03-12 RX ADMIN — SERTRALINE HYDROCHLORIDE 50 MG: 50 TABLET ORAL at 09:03

## 2022-03-12 RX ADMIN — OXYCODONE HYDROCHLORIDE 20 MG: 10 TABLET ORAL at 11:03

## 2022-03-12 RX ADMIN — Medication 10 ML: at 12:03

## 2022-03-12 RX ADMIN — Medication 10 ML: at 06:03

## 2022-03-12 RX ADMIN — OXYCODONE HYDROCHLORIDE 20 MG: 10 TABLET ORAL at 06:03

## 2022-03-12 RX ADMIN — FERROUS SULFATE TAB 325 MG (65 MG ELEMENTAL FE) 1 EACH: 325 (65 FE) TAB at 09:03

## 2022-03-12 RX ADMIN — PREGABALIN 75 MG: 75 CAPSULE ORAL at 02:03

## 2022-03-12 RX ADMIN — HEPARIN SODIUM 5000 UNITS: 5000 INJECTION INTRAVENOUS; SUBCUTANEOUS at 05:03

## 2022-03-12 RX ADMIN — OXYCODONE HYDROCHLORIDE 10 MG: 10 TABLET ORAL at 06:03

## 2022-03-12 RX ADMIN — PREGABALIN 75 MG: 75 CAPSULE ORAL at 08:03

## 2022-03-12 RX ADMIN — HEPARIN SODIUM 5000 UNITS: 5000 INJECTION INTRAVENOUS; SUBCUTANEOUS at 01:03

## 2022-03-12 NOTE — ASSESSMENT & PLAN NOTE
Rachel Zazueta is a 41 F with PMH hepatitis C, cirrhosis, pancytopenia, nicotine abuse, hx IVDU (last use 2 years ago), strep agalactae bacteremia, s/p L3-5 TLIF on 4/16/2021 who presents with persistent and progressive lumbar spondylodiscitis with resultant hardware failure and new scoliosis.     S/p hardware removal on 2/21. Now s/p T10 t pelvis posterior instrumented fusion with L5-S1 TLIF on 3/2.     Plan:  --Patient is floor status.       -q4h neurochecks.  --Post op scoliosis obtained with good hardware placement.  --All labs and diagnostics personally reviewed.  --Follow-up MRI Brain, C/T/L w/wo contrast:   -No evidence of osteomyelitis, discitis, or epidural abscess within the cervical or thoracic spine   -No diffusion positive or abnormal enhancement within the brain   -MRI brain shows patchy nonspecific increased T2 and FLAIR signal in the periventricular and subcortical white matter bilaterally. Could be due to chronic microvascular ischemic changes vs demyelinating disease given patient's age. No evidence of active demyelination.   -DDD C4-7 with mild to moderate canal stenosis  --Full infectious w/u:   -Inflammatory markers wnl. BCx 2/14 NGTD, UA/Ucx pending. Wound Cx from 2/21 neg. Wound Cx from 3/2 NGTD.    -F/u ID recs - now on Rocephin x 8 weeks. PICC placed.    -Will need placement for IV abx therapy given h/o of IVDU.  --S/p L3-4 disc biopsy and L3 bone biopsy with IR 2/15 - Gram stain rare WBC. Cx NGTD.  --Utox + for amphetamines, addiction psych consulted per .   --Hold anti-plt/coag medications.  --Pain control: Oxy 10 q4h PRN for moderate pain, Oxy 20 q4h PRN for severe pain, Dilaudid 1 mg q4h PRN when pain not relieved by PO meds weaned to off. Continue lyrica 50 mg TID.  --F/u with hematology arranged for pancytopenia. Plt 48 this am; goal > 80k. H&H stable today Asymptomatic  --Lasix 40 mg IV given for LE edema. Self reports home Lasix 20 mg daily. Patient will need f/u with PCP for  continued management. Denies shortness of breath, chest pain, calf pain.  --DVT ppx: TEDs/SCDs/SQH. BLE US on 3/9 negative.  --Bowel regimen: Miralax, senna. + BM.  --Activity: PT/OT OOB, TLSO brace when OOB.  --Continue to monitor clinically, notify NSGY immediately with any changes in neuro status.    Discussed with Dr. Templeton.    Dispo: Pending rehab authorization.

## 2022-03-12 NOTE — PROGRESS NOTES
[facility] Neurosurgery ([facility])  [facility] Medicine  Progress Note    Patient Name: Rachel Zazueta  MRN: [id number]  Patient Class: IP- Inpatient   Admission Date: 2/14/2022  Length of Stay: 26 days  Attending Physician: Guille Templeton MD  Primary Care Provider: Dannielle Merino DO        Subjective:     Principal Problem:Spondylodiscitis        HPI:  Rachel Zazueta is a 40 y/o F with a PMH of cirrhosis, Hep C, anemia, polysubstance abuse, and epidural abscess s/p L3-L4 laminectomy and L3-L5 TLIF (4/2021; blood and surgical cultures positive for group B strep) who was admitted from NS clinic for workup of severe back pain and LE weakness. She reports that she has had worsening pain in her lower back and hips since October. She also reports shooting pain down her left leg. She ambulates with a walker at home due to the pain and difficulty with balance. She denies bowel or bladder incontinence. She denies CP, SOB, nausea, vomiting. Denies hx of HTN or DM. She was lost to follow up after her previous surgery, stating that she did not go to her appointments because she was feeling good. She states that her last IV meth use was 8/2020, and last meth use was after her previous surgery 4/2021 (however tox screen 7/2021 positive for amphetamines). She reports marijuana use approx 3x per week and smokes cigarettes 1/2 pack per day. She denies use of other substances including cocaine. MRI C/T/L spine showed suspected discitis/osteomyelitis at L3-4 and L4-5 with probable hardware infection. L3-4 disc biopsy obtained by IR. Hospital medicine was consulted for preop risk stratification and medical optimization.       Overview/Hospital Course:  RCRI 0. TTE with EF 60%, no endocarditis.  ID following for abx recs, started empiric vanc and ceftriaxone. Bcx NGTD. L3-4 intervertebral disc biopsy with IR, cx negative so far. Utox positive for amphetamines and methadone metabolites. Addiction psych consulted.        Interval History: NAEON. Pt feels well after surgery 3/2. Working with PT/OT and waiting for rehab placement today with worsening pancytopenia, but denies any bleeding.     Objective:     Weight: 133.8 kg (294 lb 15.6 oz)  Body mass index is 46.2 kg/m².  Vital Signs (Most Recent):  Temp: 98.3 °F (36.8 °C) (03/12/22 1223)  Pulse: 88 (03/12/22 1223)  Resp: 16 (03/12/22 1223)  BP: 123/64 (03/12/22 1223)  SpO2: 97 % (03/12/22 1223)   Vital Signs (24h Range):  Temp:  [97.1 °F (36.2 °C)-98.8 °F (37.1 °C)] 98.3 °F (36.8 °C)  Pulse:  [88-97] 88  Resp:  [16-20] 16  SpO2:  [97 %-100 %] 97 %  BP: (112-128)/(56-69) 123/64       Neurosurgery Physical Exam  General: Well developed, well nourished, not in acute distress.   Head: Normocephalic, atraumatic.  Neck: Full ROM.   Neurologic: Alert and oriented x 4. Thought content appropriate.  GCS: Motor:6  Verbal:5  Eyes:4   GCS Total: 15  Language: No aphasia.  Speech: No dysarthria.  Cranial nerves: Face symmetric, tongue midline, CN II-XII grossly intact.   Eyes: Pupils equal, round, reactive to light with accomodation, EOMI.   Pulmonary: Normal respirations, no signs of respiratory distress.  Abdomen: Soft, non-distended, non-tender to palpation.  Sensory: Intact to light touch throughout.  Motor Strength: Moves all extremities spontaneously with good tone. No abnormal movements seen.      Strength   Deltoids Triceps Biceps Wrist Extension Wrist Flexion Hand    Upper: R 5/5 5/5 5/5 5/5 5/5 5/5     L 5/5 5/5 5/5 5/5 5/5 5/5       Hip Flexion Knee Extension Knee  Flexion Dorsiflexion Plantar flexion EHL   Lower: R 5/5 5/5 5/5 5/5 5/5 5/5     L 5/5 5/5 5/5 5/5 5/5 5/5      Parker: Absent.  Clonus: 3 beats bilaterally.  Vascular: Bilateral LE pitting edema.   Skin: Skin is warm, dry and intact.     Posterior lumbar incision c/d/i with staples. No surrounding erythema or edema. No drainage from incision. No palpable hematoma or underlying fluid collection.     HV drain  sites covered with gauze and tegaderm. No drainage.     Significant Labs:  Recent Labs   Lab 03/11/22  0126 03/12/22  0353   GLU 85 103    143   K 3.8 3.8    110   CO2 26 27   BUN 15 14   CREATININE 0.6 0.6   CALCIUM 7.9* 7.5*       Recent Labs   Lab 03/11/22  0126 03/12/22  0353   WBC 0.95* 1.08*   HGB 7.7* 7.8*   HCT 25.8* 25.7*   PLT 48* 47*       Recent Labs   Lab 03/11/22  0126 03/12/22  0353   INR 1.2 1.2       Microbiology Results (last 7 days)       Procedure Component Value Units Date/Time    Fungus culture [[id number]] Collected: 03/02/22 1228    Order Status: Completed Specimen: Back Updated: 03/09/22 1120     Fungus (Mycology) Culture Culture in progress    Narrative:      L5/S1 DISC    Fungus culture [[id number]] Collected: 02/21/22 1539    Order Status: Completed Specimen: Abscess from Back Updated: 03/09/22 0911     Fungus (Mycology) Culture Culture in progress      No fungus isolated after 2 weeks    Narrative:      Epidural purulence #1    Fungus culture [[id number]] Collected: 02/21/22 1539    Order Status: Completed Specimen: Abscess from Back Updated: 03/09/22 0911     Fungus (Mycology) Culture Culture in progress      No fungus isolated after 2 weeks    Narrative:      Epidural purulence #2    Culture, Anaerobe [[id number]] Collected: 03/02/22 1228    Order Status: Completed Specimen: Back Updated: 03/09/22 0729     Anaerobic Culture No anaerobes isolated    Narrative:      L5/S1 DISC            Review of Systems  Objective:     Vital Signs (Most Recent):  Temp: 98.3 °F (36.8 °C) (03/12/22 1223)  Pulse: 88 (03/12/22 1223)  Resp: 16 (03/12/22 1223)  BP: 123/64 (03/12/22 1223)  SpO2: 97 % (03/12/22 1223)   Vital Signs (24h Range):  Temp:  [97.1 °F (36.2 °C)-98.8 °F (37.1 °C)] 98.3 °F (36.8 °C)  Pulse:  [88-97] 88  Resp:  [16-20] 16  SpO2:  [97 %-100 %] 97 %  BP: (112-128)/(56-69) 123/64     Weight: 133.8 kg (294 lb 15.6 oz)  Body mass index is 46.2 kg/m².    Intake/Output Summary (Last 24  hours) at 3/12/2022 1446  Last data filed at 3/12/2022 0500  Gross per 24 hour   Intake 1250 ml   Output --   Net 1250 ml        Physical Exam  Constitutional:       Appearance: Normal appearance.   Cardiovascular:      Rate and Rhythm: Normal rate.   Pulmonary:      Effort: Pulmonary effort is normal. No respiratory distress.      Breath sounds: No wheezing or rales.   Abdominal:      General: There is no distension.      Palpations: Abdomen is soft.      Tenderness: There is no abdominal tenderness.   Musculoskeletal:         General: Swelling present.      Right lower leg: Edema present.      Left lower leg: Edema present.      Comments: She has a brace on and was able to walk in the room during the exam    Skin:     Capillary Refill: Capillary refill takes more than 3 seconds.      Coloration: Skin is not jaundiced.      Findings: No lesion or rash.   Neurological:      Mental Status: She is alert and oriented to person, place, and time.       Significant Labs: All pertinent labs within the past 24 hours have been reviewed.    Significant Imaging: I have reviewed all pertinent imaging results/findings within the past 24 hours.      Assessment/Plan:      * Spondylodiscitis  Hx of epidural abscess 4/2021 with blood and surgical cultures positive for group B strep.  Echo 2/15 negative for endocarditis. ID following for antibiotic recs.     Recommendations:  - blood cx negative   - biopsy cx negative   - urine tox screen positive for amphetamines, methadone; received opiates and benzos while admitted  - addiction psych consulted   - continue antibiotics per ID recs  - f/u surgical cultures   - PICC placed  - will need placement to continue IV abx       Hyperbilirubinemia  Tbili 2.1 on admission.     Recommendations:  - daily CMP   - Tbili trending down     Coagulopathy        Tobacco use disorder  Reports currently smoking 1/2 ppd.      Pre-op evaluation  40 y/o F with hx of epidural abscess s/p L3 and L4  laminectomy and L3-L5 TLIF (4/2021) admitted from NSGY clinic with worsening lumbar back pain and LE weakness. MRI C/T/L spine showed suspected discitis/osteomyelitis at L3-4 and L4-5 with probable hardware infection. IR consulted for biopsy. TTE 2/15 with EF 60%. EKG with NS.    Surgical Risk Assessment    Active cardiac issues:  Active decompensated heart failure? No   Unstable angina?  No   Significant uncontrolled arrhythmias? No   Severe valvular heart disease-Aortic or Mitral Stenosis? No   Recent MI or coronary revascularization < 30 days? No     Cardiac Risk Factors  History of CAD/ischemic heart disease? No   History of cerebrovascular disease? No   History of compensated heart failure? No   Type 2 diabetes requiring insulin? No   Serum Creatinine > 2? No   Total cardiac risk factors 0     Functional mets limited by pain, able to walk with walker.    < 4* METs -unable to walk > 2 blocks on level ground without stopping due to symptoms  - eating, dressing, toileting, walking indoors, light housework. POOR   > 4* METs -climbing > 1 flight of stairs without stopping  -walking up hill > 1-2 blocks  -scrubbing floors  -moving furniture  - golf, bowling, dancing or tennis  -running short distance MODERATE to EXCELLENT   * performance of any one of the activities       Cardiovascular Risk Assessment:  Non-emergent surgery.  No active cardiac problems (such as unstable angina, decompensated heart failure, significant uncontrolled arrhythmias or severe valvular disease).  Intermediate risk surgery.  Functional Status: limited by pain  Her revised cardiac risk index is 0 (3.9% 30-day risk of death, MI, or cardiac arrest).       - S/p washout, hardware removal, extension of fusion 2/21  - Will continue to follow        Thrombocytopenia  Chronic, likely due to cirrhosis.     Recommendations:  - daily CBC      Chronic hepatitis C with cirrhosis  PMH cirrhosis 2/2 HCV. Pt denies ever receiving treatment for HCV. Pt  reports previous liver biopsy, but no records of this available. She has been referred to hepatology previously but did not follow up.   MELD-Na score: 11 at 2/23/2022  6:52 AM  MELD score: 11 at 2/23/2022  6:52 AM  Calculated from:  Serum Creatinine: 0.6 mg/dL (Using min of 1 mg/dL) at 2/23/2022  6:52 AM  Serum Sodium: 136 mmol/L at 2/23/2022  6:52 AM  Total Bilirubin: 1.5 mg/dL at 2/23/2022  6:52 AM  INR(ratio): 1.3 at 2/23/2022  6:52 AM  Age: 41 years    Recommendations:  - U/S abdomen with doppler showed cirrhosis and portal hypertension satisfactory Doppler evaluation   - daily CMP and INR  - will need hepatology referral on discharge   - lipase WNL, RUQ/epigastric pain resolved after BM     Cirrhosis of liver without ascites        Pancytopenia  Hx of bone marrow biopsy 2017 that was nondiagnostic.   She has an appointment with hem/onc this month 3/22  Recommendations:  - daily labs  - transfuse for Hgb <7  - transfuse for plt <10 or <50 with bleeding        VTE Risk Mitigation (From admission, onward)         Ordered     heparin (porcine) injection 5,000 Units  Every 8 hours         03/09/22 1027     heparin (porcine) injection 5,000 Units  Every 8 hours         02/22/22 0914     heparin (porcine) injection 7,500 Units  Every 8 hours         02/18/22 1353     IP VTE HIGH RISK PATIENT  Once         02/14/22 1936     Place sequential compression device  Until discontinued         02/14/22 1936                Discharge Planning   SHIRA: 3/14/2022     Code Status: Full Code   Is the patient medically ready for discharge?:     Reason for patient still in hospital (select all that apply): Pending disposition  Discharge Plan A: Rehab   Discharge Delays: None known at this time              Simi Argueta MD  Department of Hospital Medicine   [facility] - Neurosurgery ([facility])

## 2022-03-12 NOTE — SUBJECTIVE & OBJECTIVE
Interval History: NAEON. VSS. H/H stable today. Pt is asymptomatic and remains neuro stable. Rehab pending    Medications:  Continuous Infusions:   sodium chloride 0.9% Stopped (03/03/22 1708)     Scheduled Meds:   acetaminophen  1,000 mg Oral Q6H    alteplase  2 mg Intra-Catheter Once    cefTRIAXone (ROCEPHIN) IVPB  2 g Intravenous Q12H    ferrous sulfate  1 tablet Oral Daily    heparin (porcine)  5,000 Units Subcutaneous Q8H    LIDOcaine  1 patch Transdermal Q24H    miconazole NITRATE 2 %   Topical (Top) BID    pantoprazole  40 mg Oral Daily    pregabalin  75 mg Oral TID    senna-docusate 8.6-50 mg  1 tablet Oral Daily    sertraline  50 mg Oral Daily    sodium chloride 0.9%  10 mL Intravenous Q6H     PRN Meds:sodium chloride, sodium chloride, sodium chloride, aluminum-magnesium hydroxide-simethicone, dextrose 10%, dextrose 10%, glucagon (human recombinant), glucose, glucose, HYDROmorphone, melatonin, oxyCODONE, oxyCODONE, polyethylene glycol, Flushing PICC Protocol **AND** sodium chloride 0.9% **AND** sodium chloride 0.9%     Objective:     Weight: 133.8 kg (294 lb 15.6 oz)  Body mass index is 46.2 kg/m².  Vital Signs (Most Recent):  Temp: 97.7 °F (36.5 °C) (03/12/22 0831)  Pulse: 89 (03/12/22 0831)  Resp: 18 (03/12/22 0831)  BP: (!) 112/59 (03/12/22 0831)  SpO2: 100 % (03/12/22 0831)   Vital Signs (24h Range):  Temp:  [97.1 °F (36.2 °C)-98.9 °F (37.2 °C)] 97.7 °F (36.5 °C)  Pulse:  [88-97] 89  Resp:  [16-20] 18  SpO2:  [99 %-100 %] 100 %  BP: (112-128)/(56-69) 112/59       Neurosurgery Physical Exam  General: Well developed, well nourished, not in acute distress.   Head: Normocephalic, atraumatic.  Neck: Full ROM.   Neurologic: Alert and oriented x 4. Thought content appropriate.  GCS: Motor:6  Verbal:5  Eyes:4   GCS Total: 15  Language: No aphasia.  Speech: No dysarthria.  Cranial nerves: Face symmetric, tongue midline, CN II-XII grossly intact.   Eyes: Pupils equal, round, reactive to light with accomodation,  EOMI.   Pulmonary: Normal respirations, no signs of respiratory distress.  Abdomen: Soft, non-distended, non-tender to palpation.  Sensory: Intact to light touch throughout.  Motor Strength: Moves all extremities spontaneously with good tone. No abnormal movements seen.      Strength   Deltoids Triceps Biceps Wrist Extension Wrist Flexion Hand    Upper: R 5/5 5/5 5/5 5/5 5/5 5/5     L 5/5 5/5 5/5 5/5 5/5 5/5       Hip Flexion Knee Extension Knee  Flexion Dorsiflexion Plantar flexion EHL   Lower: R 5/5 5/5 5/5 5/5 5/5 5/5     L 5/5 5/5 5/5 5/5 5/5 5/5      Parker: Absent.  Clonus: 3 beats bilaterally.  Vascular: Bilateral LE pitting edema.   Skin: Skin is warm, dry and intact.     Posterior lumbar incision c/d/i with staples. No surrounding erythema or edema. No drainage from incision. No palpable hematoma or underlying fluid collection.        Significant Labs:  Recent Labs   Lab 03/11/22 0126 03/12/22  0353   GLU 85 103    143   K 3.8 3.8    110   CO2 26 27   BUN 15 14   CREATININE 0.6 0.6   CALCIUM 7.9* 7.5*     Recent Labs   Lab 03/11/22 0126 03/12/22  0353   WBC 0.95* 1.08*   HGB 7.7* 7.8*   HCT 25.8* 25.7*   PLT 48* 47*     Recent Labs   Lab 03/11/22 0126 03/12/22  0353   INR 1.2 1.2     Microbiology Results (last 7 days)       Procedure Component Value Units Date/Time    Fungus culture [[id number]] Collected: 03/02/22 1228    Order Status: Completed Specimen: Back Updated: 03/09/22 1120     Fungus (Mycology) Culture Culture in progress    Narrative:      L5/S1 DISC    Fungus culture [[id number]] Collected: 02/21/22 1539    Order Status: Completed Specimen: Abscess from Back Updated: 03/09/22 0911     Fungus (Mycology) Culture Culture in progress      No fungus isolated after 2 weeks    Narrative:      Epidural purulence #1    Fungus culture [[id number]] Collected: 02/21/22 1539    Order Status: Completed Specimen: Abscess from Back Updated: 03/09/22 0911     Fungus (Mycology) Culture Culture  in progress      No fungus isolated after 2 weeks    Narrative:      Epidural purulence #2    Culture, Anaerobe [[id number]] Collected: 03/02/22 1228    Order Status: Completed Specimen: Back Updated: 03/09/22 0729     Anaerobic Culture No anaerobes isolated    Narrative:      L5/S1 DISC    Aerobic culture [[id number]] Collected: 03/02/22 1228    Order Status: Completed Specimen: Back Updated: 03/05/22 1108     Aerobic Bacterial Culture No growth    Narrative:      L5/S1 DISC          All pertinent labs from the last 24 hours have been reviewed.    Significant Diagnostics:  I have reviewed and interpreted all pertinent imaging results/findings within the past 24 hours.I

## 2022-03-12 NOTE — PROGRESS NOTES
[facility] - Neurosurgery ([facility])  Neurosurgery  Progress Note    Subjective:     History of Present Illness: Rachel Zazueta is a 41 y.o.  female with PMHx of Hepatitis C, liver cirrhosis, pancytopenia, and polysubstance abuse, who presents to clinic for follow up with new imaging s/p L3 and L4 laminectomy for evacuation of epidural abscess and L3-L5 TLIF on 04/16/2021.     The pt c/o worsening back and left leg pain since October. She states that she is no longer using IV drugs. States only using marijuana. Describes the left leg pain as a shock down the leg when standing for 10 minutes. Denies taking any antibiotics. Endorses new weakness in the legs. Denies b/b dysfunction. Denies new neck pain. She ambulates with a walker at home. States that she smoke.    She presents to ED as direct admit from clinic.       Post-Op Info:  Procedure(s) (LRB):  FUSION, SPINE, LUMBAR (Bilateral)   10 Days Post-Op     Interval History: NAEON. VSS. H/H stable today. Pt is asymptomatic and remains neuro stable. Rehab pending    Medications:  Continuous Infusions:   sodium chloride 0.9% Stopped (03/03/22 1708)     Scheduled Meds:   acetaminophen  1,000 mg Oral Q6H    alteplase  2 mg Intra-Catheter Once    cefTRIAXone (ROCEPHIN) IVPB  2 g Intravenous Q12H    ferrous sulfate  1 tablet Oral Daily    heparin (porcine)  5,000 Units Subcutaneous Q8H    LIDOcaine  1 patch Transdermal Q24H    miconazole NITRATE 2 %   Topical (Top) BID    pantoprazole  40 mg Oral Daily    pregabalin  75 mg Oral TID    senna-docusate 8.6-50 mg  1 tablet Oral Daily    sertraline  50 mg Oral Daily    sodium chloride 0.9%  10 mL Intravenous Q6H     PRN Meds:sodium chloride, sodium chloride, sodium chloride, aluminum-magnesium hydroxide-simethicone, dextrose 10%, dextrose 10%, glucagon (human recombinant), glucose, glucose, HYDROmorphone, melatonin, oxyCODONE, oxyCODONE, polyethylene glycol, Flushing PICC Protocol **AND** sodium chloride 0.9%  **AND** sodium chloride 0.9%     Objective:     Weight: 133.8 kg (294 lb 15.6 oz)  Body mass index is 46.2 kg/m².  Vital Signs (Most Recent):  Temp: 97.7 °F (36.5 °C) (03/12/22 0831)  Pulse: 89 (03/12/22 0831)  Resp: 18 (03/12/22 0831)  BP: (!) 112/59 (03/12/22 0831)  SpO2: 100 % (03/12/22 0831)   Vital Signs (24h Range):  Temp:  [97.1 °F (36.2 °C)-98.9 °F (37.2 °C)] 97.7 °F (36.5 °C)  Pulse:  [88-97] 89  Resp:  [16-20] 18  SpO2:  [99 %-100 %] 100 %  BP: (112-128)/(56-69) 112/59       Neurosurgery Physical Exam  General: Well developed, well nourished, not in acute distress.   Head: Normocephalic, atraumatic.  Neck: Full ROM.   Neurologic: Alert and oriented x 4. Thought content appropriate.  GCS: Motor:6  Verbal:5  Eyes:4   GCS Total: 15  Language: No aphasia.  Speech: No dysarthria.  Cranial nerves: Face symmetric, tongue midline, CN II-XII grossly intact.   Eyes: Pupils equal, round, reactive to light with accomodation, EOMI.   Pulmonary: Normal respirations, no signs of respiratory distress.  Abdomen: Soft, non-distended, non-tender to palpation.  Sensory: Intact to light touch throughout.  Motor Strength: Moves all extremities spontaneously with good tone. No abnormal movements seen.      Strength   Deltoids Triceps Biceps Wrist Extension Wrist Flexion Hand    Upper: R 5/5 5/5 5/5 5/5 5/5 5/5     L 5/5 5/5 5/5 5/5 5/5 5/5       Hip Flexion Knee Extension Knee  Flexion Dorsiflexion Plantar flexion EHL   Lower: R 5/5 5/5 5/5 5/5 5/5 5/5     L 5/5 5/5 5/5 5/5 5/5 5/5      Parker: Absent.  Clonus: 3 beats bilaterally.  Vascular: Bilateral LE pitting edema.   Skin: Skin is warm, dry and intact.     Posterior lumbar incision c/d/i with staples. No surrounding erythema or edema. No drainage from incision. No palpable hematoma or underlying fluid collection.        Significant Labs:  Recent Labs   Lab 03/11/22  0126 03/12/22  0353   GLU 85 103    143   K 3.8 3.8    110   CO2 26 27   BUN 15 14    CREATININE 0.6 0.6   CALCIUM 7.9* 7.5*     Recent Labs   Lab 03/11/22  0126 03/12/22  0353   WBC 0.95* 1.08*   HGB 7.7* 7.8*   HCT 25.8* 25.7*   PLT 48* 47*     Recent Labs   Lab 03/11/22  0126 03/12/22  0353   INR 1.2 1.2     Microbiology Results (last 7 days)       Procedure Component Value Units Date/Time    Fungus culture [[id number]] Collected: 03/02/22 1228    Order Status: Completed Specimen: Back Updated: 03/09/22 1120     Fungus (Mycology) Culture Culture in progress    Narrative:      L5/S1 DISC    Fungus culture [[id number]] Collected: 02/21/22 1539    Order Status: Completed Specimen: Abscess from Back Updated: 03/09/22 0911     Fungus (Mycology) Culture Culture in progress      No fungus isolated after 2 weeks    Narrative:      Epidural purulence #1    Fungus culture [[id number]] Collected: 02/21/22 1539    Order Status: Completed Specimen: Abscess from Back Updated: 03/09/22 0911     Fungus (Mycology) Culture Culture in progress      No fungus isolated after 2 weeks    Narrative:      Epidural purulence #2    Culture, Anaerobe [[id number]] Collected: 03/02/22 1228    Order Status: Completed Specimen: Back Updated: 03/09/22 0729     Anaerobic Culture No anaerobes isolated    Narrative:      L5/S1 DISC    Aerobic culture [[id number]] Collected: 03/02/22 1228    Order Status: Completed Specimen: Back Updated: 03/05/22 1108     Aerobic Bacterial Culture No growth    Narrative:      L5/S1 DISC          All pertinent labs from the last 24 hours have been reviewed.    Significant Diagnostics:  I have reviewed and interpreted all pertinent imaging results/findings within the past 24 hours.I    Assessment/Plan:     * Spondylodiscitis  Rachel Zazueta is a 41 F with PMH hepatitis C, cirrhosis, pancytopenia, nicotine abuse, hx IVDU (last use 2 years ago), strep agalactae bacteremia, s/p L3-5 TLIF on 4/16/2021 who presents with persistent and progressive lumbar spondylodiscitis with resultant hardware failure and  new scoliosis.     S/p hardware removal on 2/21. Now s/p T10 t pelvis posterior instrumented fusion with L5-S1 TLIF on 3/2.     Plan:  --Patient is floor status.       -q4h neurochecks.  --Post op scoliosis obtained with good hardware placement.  --All labs and diagnostics personally reviewed.  --Follow-up MRI Brain, C/T/L w/wo contrast:   -No evidence of osteomyelitis, discitis, or epidural abscess within the cervical or thoracic spine   -No diffusion positive or abnormal enhancement within the brain   -MRI brain shows patchy nonspecific increased T2 and FLAIR signal in the periventricular and subcortical white matter bilaterally. Could be due to chronic microvascular ischemic changes vs demyelinating disease given patient's age. No evidence of active demyelination.   -DDD C4-7 with mild to moderate canal stenosis  --Full infectious w/u:   -Inflammatory markers wnl. BCx 2/14 NGTD, UA/Ucx pending. Wound Cx from 2/21 neg. Wound Cx from 3/2 NGTD.    -F/u ID recs - now on Rocephin x 8 weeks. PICC placed.    -Will need placement for IV abx therapy given h/o of IVDU.  --S/p L3-4 disc biopsy and L3 bone biopsy with IR 2/15 - Gram stain rare WBC. Cx NGTD.  --Utox + for amphetamines, addiction psych consulted per .   --Hold anti-plt/coag medications.  --Pain control: Oxy 10 q4h PRN for moderate pain, Oxy 20 q4h PRN for severe pain, Dilaudid 1 mg q4h PRN when pain not relieved by PO meds weaned to off. Continue lyrica 50 mg TID.  --F/u with hematology arranged for pancytopenia. Plt 48 this am; goal > 80k. H&H stable today Asymptomatic  --Lasix 40 mg IV given for LE edema. Self reports home Lasix 20 mg daily. Patient will need f/u with PCP for continued management. Denies shortness of breath, chest pain, calf pain.  --DVT ppx: TEDs/SCDs/SQH. BLE US on 3/9 negative.  --Bowel regimen: Miralax, senna. + BM.  --Activity: PT/OT OOB, TLSO brace when OOB.  --Continue to monitor clinically, notify NSGY immediately with any changes  in neuro status.    Discussed with Dr. Templeton.    Dispo: Pending rehab authorization.        Juliane Valero PA-C  Neurosurgery  [facility] - Neurosurgery ([facility])

## 2022-03-12 NOTE — PLAN OF CARE
"Ms. Zazueta is 42 y/o F with PMH of cirrhosis, Hep C, pancytopenia, polysubstance abuse, and epidural abscess s/p L3-L4 laminectomy and L3-L5 TLIF (4/2021; blood and surgical cultures positive for group B strep) admitted from [facility] clinic with progressive lumbar spondylodiscitis and hardware failure. IM6 consulted for preop risk stratification and medical optimization for liver cirrhosis.      Labs and vital signs reviewed. Pancytopenia is stable today.     MELD-Na score: 8 at 3/12/2022  3:53 AM  MELD score: 8 at 3/12/2022  3:53 AM  Calculated from:  Serum Creatinine: 0.6 mg/dL (Using min of 1 mg/dL) at 3/12/2022  3:53 AM  Serum Sodium: 143 mmol/L (Using max of 137 mmol/L) at 3/12/2022  3:53 AM  Total Bilirubin: 0.8 mg/dL (Using min of 1 mg/dL) at 3/12/2022  3:53 AM  INR(ratio): 1.2 at 3/12/2022  3:53 AM  Age: 41 years     - daily MELD score assess for decompensation of cirhosis  - continue   - daily CMP, INR   - transfuse for of blood product per primary team, transfuse for Hgb <7  - transfuse for plt <10 or <50 with bleeding  - She has an appointment with hem/onc this month 3/22  - will need outpatient hepatology referral    - recommend placement on discharge for continued IV abx given hx of drug use and noncompliance with previous IV abx      Please secure chat [facility]  ("Medicine Consult Only") or contact me directly for any additional questions or concerns     Simi Argueta MD  Internal Medicine, PGYIII  "

## 2022-03-12 NOTE — SUBJECTIVE & OBJECTIVE
Interval History: NAEON. Pt feels well after surgery 3/2. Working with PT/OT and waiting for rehab placement today with worsening pancytopenia, but denies any bleeding.     Objective:     Weight: 133.8 kg (294 lb 15.6 oz)  Body mass index is 46.2 kg/m².  Vital Signs (Most Recent):  Temp: 98.3 °F (36.8 °C) (03/12/22 1223)  Pulse: 88 (03/12/22 1223)  Resp: 16 (03/12/22 1223)  BP: 123/64 (03/12/22 1223)  SpO2: 97 % (03/12/22 1223)   Vital Signs (24h Range):  Temp:  [97.1 °F (36.2 °C)-98.8 °F (37.1 °C)] 98.3 °F (36.8 °C)  Pulse:  [88-97] 88  Resp:  [16-20] 16  SpO2:  [97 %-100 %] 97 %  BP: (112-128)/(56-69) 123/64       Neurosurgery Physical Exam  General: Well developed, well nourished, not in acute distress.   Head: Normocephalic, atraumatic.  Neck: Full ROM.   Neurologic: Alert and oriented x 4. Thought content appropriate.  GCS: Motor:6  Verbal:5  Eyes:4   GCS Total: 15  Language: No aphasia.  Speech: No dysarthria.  Cranial nerves: Face symmetric, tongue midline, CN II-XII grossly intact.   Eyes: Pupils equal, round, reactive to light with accomodation, EOMI.   Pulmonary: Normal respirations, no signs of respiratory distress.  Abdomen: Soft, non-distended, non-tender to palpation.  Sensory: Intact to light touch throughout.  Motor Strength: Moves all extremities spontaneously with good tone. No abnormal movements seen.      Strength   Deltoids Triceps Biceps Wrist Extension Wrist Flexion Hand    Upper: R 5/5 5/5 5/5 5/5 5/5 5/5     L 5/5 5/5 5/5 5/5 5/5 5/5       Hip Flexion Knee Extension Knee  Flexion Dorsiflexion Plantar flexion EHL   Lower: R 5/5 5/5 5/5 5/5 5/5 5/5     L 5/5 5/5 5/5 5/5 5/5 5/5      Parker: Absent.  Clonus: 3 beats bilaterally.  Vascular: Bilateral LE pitting edema.   Skin: Skin is warm, dry and intact.     Posterior lumbar incision c/d/i with staples. No surrounding erythema or edema. No drainage from incision. No palpable hematoma or underlying fluid collection.     HV drain sites  covered with gauze and tegaderm. No drainage.     Significant Labs:  Recent Labs   Lab 03/11/22  0126 03/12/22  0353   GLU 85 103    143   K 3.8 3.8    110   CO2 26 27   BUN 15 14   CREATININE 0.6 0.6   CALCIUM 7.9* 7.5*       Recent Labs   Lab 03/11/22  0126 03/12/22  0353   WBC 0.95* 1.08*   HGB 7.7* 7.8*   HCT 25.8* 25.7*   PLT 48* 47*       Recent Labs   Lab 03/11/22  0126 03/12/22  0353   INR 1.2 1.2       Microbiology Results (last 7 days)       Procedure Component Value Units Date/Time    Fungus culture [[id number]] Collected: 03/02/22 1228    Order Status: Completed Specimen: Back Updated: 03/09/22 1120     Fungus (Mycology) Culture Culture in progress    Narrative:      L5/S1 DISC    Fungus culture [[id number]] Collected: 02/21/22 1539    Order Status: Completed Specimen: Abscess from Back Updated: 03/09/22 0911     Fungus (Mycology) Culture Culture in progress      No fungus isolated after 2 weeks    Narrative:      Epidural purulence #1    Fungus culture [[id number]] Collected: 02/21/22 1539    Order Status: Completed Specimen: Abscess from Back Updated: 03/09/22 0911     Fungus (Mycology) Culture Culture in progress      No fungus isolated after 2 weeks    Narrative:      Epidural purulence #2    Culture, Anaerobe [[id number]] Collected: 03/02/22 1228    Order Status: Completed Specimen: Back Updated: 03/09/22 0729     Anaerobic Culture No anaerobes isolated    Narrative:      L5/S1 DISC            Review of Systems  Objective:     Vital Signs (Most Recent):  Temp: 98.3 °F (36.8 °C) (03/12/22 1223)  Pulse: 88 (03/12/22 1223)  Resp: 16 (03/12/22 1223)  BP: 123/64 (03/12/22 1223)  SpO2: 97 % (03/12/22 1223)   Vital Signs (24h Range):  Temp:  [97.1 °F (36.2 °C)-98.8 °F (37.1 °C)] 98.3 °F (36.8 °C)  Pulse:  [88-97] 88  Resp:  [16-20] 16  SpO2:  [97 %-100 %] 97 %  BP: (112-128)/(56-69) 123/64     Weight: 133.8 kg (294 lb 15.6 oz)  Body mass index is 46.2 kg/m².    Intake/Output Summary (Last 24 hours)  at 3/12/2022 1446  Last data filed at 3/12/2022 0500  Gross per 24 hour   Intake 1250 ml   Output --   Net 1250 ml        Physical Exam  Constitutional:       Appearance: Normal appearance.   Cardiovascular:      Rate and Rhythm: Normal rate.   Pulmonary:      Effort: Pulmonary effort is normal. No respiratory distress.      Breath sounds: No wheezing or rales.   Abdominal:      General: There is no distension.      Palpations: Abdomen is soft.      Tenderness: There is no abdominal tenderness.   Musculoskeletal:         General: Swelling present.      Right lower leg: Edema present.      Left lower leg: Edema present.      Comments: She has a brace on and was able to walk in the room during the exam    Skin:     Capillary Refill: Capillary refill takes more than 3 seconds.      Coloration: Skin is not jaundiced.      Findings: No lesion or rash.   Neurological:      Mental Status: She is alert and oriented to person, place, and time.       Significant Labs: All pertinent labs within the past 24 hours have been reviewed.    Significant Imaging: I have reviewed all pertinent imaging results/findings within the past 24 hours.

## 2022-03-12 NOTE — PLAN OF CARE
Problem: Adult Inpatient Plan of Care  Goal: Plan of Care Review  Outcome: Ongoing, Progressing  Goal: Patient-Specific Goal (Individualized)  Outcome: Ongoing, Progressing  Goal: Absence of Hospital-Acquired Illness or Injury  Outcome: Ongoing, Progressing  Goal: Optimal Comfort and Wellbeing  Outcome: Ongoing, Progressing  Goal: Readiness for Transition of Care  Outcome: Ongoing, Progressing     Problem: Bariatric Environmental Safety  Goal: Safety Maintained with Care  Outcome: Ongoing, Progressing     Problem: Fall Injury Risk  Goal: Absence of Fall and Fall-Related Injury  Outcome: Ongoing, Progressing     Problem: Skin Injury Risk Increased  Goal: Skin Health and Integrity  Outcome: Ongoing, Progressing     Problem: Infection  Goal: Absence of Infection Signs and Symptoms  Outcome: Ongoing, Progressing     Problem: Impaired Wound Healing  Goal: Optimal Wound Healing  Outcome: Ongoing, Progressing     See patients flowsheets, MAR, I&O and vital signs

## 2022-03-12 NOTE — PT/OT/SLP PROGRESS
"Physical Therapy Treatment    Patient Name:  Rachel Zazueta   MRN:  [id number]    Recommendations:     Discharge Recommendations:  rehabilitation facility   Discharge Equipment Recommendations:  (TBD)   Barriers to discharge: Inaccessible home and impaired functional mobility requiring increased assistance    Assessment:     Rachel Zazueta is a 41 y.o. female admitted with a medical diagnosis of Spondylodiscitis.  She presents with the following impairments/functional limitations:  weakness, impaired endurance, impaired self care skills, impaired functional mobilty, gait instability, impaired balance, decreased lower extremity function, decreased safety awareness, pain, decreased ROM. Pt participated, and tolerated treatment well. Pt will continue to benefit from skilled PT services to improve all deficits noted above. Resume PT POC as indicated.     Rehab Prognosis: Good; patient would benefit from acute skilled PT services to address these deficits and reach maximum level of function.    Recent Surgery: Procedure(s) (LRB):  FUSION, SPINE, LUMBAR (Bilateral) 10 Days Post-Op    Plan:     During this hospitalization, patient to be seen 3 x/week to address the identified rehab impairments via gait training, therapeutic activities, therapeutic exercises, neuromuscular re-education and progress toward the following goals:    · Plan of Care Expires:  03/24/22    Subjective     Chief Complaint: none stated  Patient/Family Comments/goals: "To go home."  Pain/Comfort:  · Pain Rating 1: 0/10  · Pain Rating Post-Intervention 1: 0/10      Objective:     Communicated with nursing prior to session.  Patient found HOB elevated with  (all lines intact) upon PT entry to room.     General Precautions: Standard, fall   Orthopedic Precautions:spinal precautions   Braces: TLSO  Respiratory Status: Room air     Functional Mobility:  · Bed Mobility:  Rolling Left:  modified independence  · Supine to Sit: supervision  · Transfers:  " Sit to Stand:  stand by assistance with rolling walker  · Bed<> Chair: stand by assistance with  rolling walker  using  Stand Pivot  · Toilet Transfer: stand by assistance with RW using  Step Transfer  · Gait: ~200ft SBA using RW with a reciprocal gait pattern. Pt required cueing to maintain upright posture.   · Stairs: Pt declined 2/2 fatigue.   · Balance:       AM-PAC 6 CLICK MOBILITY  Turning over in bed (including adjusting bedclothes, sheets and blankets)?: 3  Sitting down on and standing up from a chair with arms (e.g., wheelchair, bedside commode, etc.): 3  Moving from lying on back to sitting on the side of the bed?: 3  Moving to and from a bed to a chair (including a wheelchair)?: 3  Need to walk in hospital room?: 3  Climbing 3-5 steps with a railing?: 2  Basic Mobility Total Score: 17       Therapeutic Activities and Exercises:   -Donned extra gown, TLSO, and  socks at start of session w/ assistance   -reviewed discussed spinal precautions at start of session, pt able to recall 3/3 spinal precautions at end of tx session.   -BLE therex 3x10 reps: AP and LAQ    Patient left HOB elevated with all lines intact, call button in reach and nursing notified..    GOALS:   Multidisciplinary Problems     Physical Therapy Goals        Problem: Physical Therapy Goal    Goal Priority Disciplines Outcome Goal Variances Interventions   Physical Therapy Goal     PT, PT/OT Ongoing, Progressing     Description: Goals to be met by: 3/18/2022     Patient will increase functional independence with mobility by performin. Supine to sit with Supervision  2. Sit to supine with Supervision  3. Rolling to Left and Right with Supervision  4. Sit to stand transfer with Stand-by Assistance and RW  5. Bed to chair transfer with Minimal Assistance using Rolling Walker                     Time Tracking:     PT Received On: 22  PT Start Time: 1028     PT Stop Time: 1106  PT Total Time (min): 38 min     Billable Minutes:  Gait Training 10 and Therapeutic Activity 28    Treatment Type: Treatment  PT/PTA: PTA     PTA Visit Number: 3     03/12/2022

## 2022-03-13 LAB
ALBUMIN SERPL BCP-MCNC: 2 G/DL (ref 3.5–5.2)
ALP SERPL-CCNC: 110 U/L (ref 55–135)
ALT SERPL W/O P-5'-P-CCNC: 20 U/L (ref 10–44)
ANION GAP SERPL CALC-SCNC: 6 MMOL/L (ref 8–16)
ANISOCYTOSIS BLD QL SMEAR: SLIGHT
AST SERPL-CCNC: 41 U/L (ref 10–40)
BASOPHILS # BLD AUTO: 0.01 K/UL (ref 0–0.2)
BASOPHILS NFR BLD: 0.9 % (ref 0–1.9)
BILIRUB SERPL-MCNC: 0.8 MG/DL (ref 0.1–1)
BUN SERPL-MCNC: 16 MG/DL (ref 6–20)
CALCIUM SERPL-MCNC: 7.8 MG/DL (ref 8.7–10.5)
CHLORIDE SERPL-SCNC: 110 MMOL/L (ref 95–110)
CO2 SERPL-SCNC: 24 MMOL/L (ref 23–29)
CREAT SERPL-MCNC: 0.6 MG/DL (ref 0.5–1.4)
DACRYOCYTES BLD QL SMEAR: ABNORMAL
DIFFERENTIAL METHOD: ABNORMAL
EOSINOPHIL # BLD AUTO: 0.1 K/UL (ref 0–0.5)
EOSINOPHIL NFR BLD: 6.4 % (ref 0–8)
ERYTHROCYTE [DISTWIDTH] IN BLOOD BY AUTOMATED COUNT: 16.7 % (ref 11.5–14.5)
EST. GFR  (AFRICAN AMERICAN): >60 ML/MIN/1.73 M^2
EST. GFR  (NON AFRICAN AMERICAN): >60 ML/MIN/1.73 M^2
GLUCOSE SERPL-MCNC: 90 MG/DL (ref 70–110)
HCT VFR BLD AUTO: 26 % (ref 37–48.5)
HGB BLD-MCNC: 7.7 G/DL (ref 12–16)
HYPOCHROMIA BLD QL SMEAR: ABNORMAL
IMM GRANULOCYTES # BLD AUTO: 0.01 K/UL (ref 0–0.04)
IMM GRANULOCYTES NFR BLD AUTO: 0.9 % (ref 0–0.5)
INR PPP: 1.3 (ref 0.8–1.2)
LYMPHOCYTES # BLD AUTO: 0.3 K/UL (ref 1–4.8)
LYMPHOCYTES NFR BLD: 24.5 % (ref 18–48)
MCH RBC QN AUTO: 28.6 PG (ref 27–31)
MCHC RBC AUTO-ENTMCNC: 29.6 G/DL (ref 32–36)
MCV RBC AUTO: 97 FL (ref 82–98)
MONOCYTES # BLD AUTO: 0.1 K/UL (ref 0.3–1)
MONOCYTES NFR BLD: 10.9 % (ref 4–15)
NEUTROPHILS # BLD AUTO: 0.6 K/UL (ref 1.8–7.7)
NEUTROPHILS NFR BLD: 56.4 % (ref 38–73)
NRBC BLD-RTO: 0 /100 WBC
OVALOCYTES BLD QL SMEAR: ABNORMAL
PLATELET # BLD AUTO: 52 K/UL (ref 150–450)
PLATELET BLD QL SMEAR: ABNORMAL
PMV BLD AUTO: 10.8 FL (ref 9.2–12.9)
POIKILOCYTOSIS BLD QL SMEAR: SLIGHT
POLYCHROMASIA BLD QL SMEAR: ABNORMAL
POTASSIUM SERPL-SCNC: 3.6 MMOL/L (ref 3.5–5.1)
PROT SERPL-MCNC: 5 G/DL (ref 6–8.4)
PROTHROMBIN TIME: 13.5 SEC (ref 9–12.5)
RBC # BLD AUTO: 2.69 M/UL (ref 4–5.4)
SODIUM SERPL-SCNC: 140 MMOL/L (ref 136–145)
WBC # BLD AUTO: 1.1 K/UL (ref 3.9–12.7)

## 2022-03-13 PROCEDURE — 85610 PROTHROMBIN TIME: CPT

## 2022-03-13 PROCEDURE — 25000003 PHARM REV CODE 250

## 2022-03-13 PROCEDURE — 63600175 PHARM REV CODE 636 W HCPCS: Performed by: PHYSICIAN ASSISTANT

## 2022-03-13 PROCEDURE — 63600175 PHARM REV CODE 636 W HCPCS

## 2022-03-13 PROCEDURE — 25000003 PHARM REV CODE 250: Performed by: STUDENT IN AN ORGANIZED HEALTH CARE EDUCATION/TRAINING PROGRAM

## 2022-03-13 PROCEDURE — 25000003 PHARM REV CODE 250: Performed by: NEUROLOGICAL SURGERY

## 2022-03-13 PROCEDURE — C1751 CATH, INF, PER/CENT/MIDLINE: HCPCS

## 2022-03-13 PROCEDURE — 80053 COMPREHEN METABOLIC PANEL: CPT

## 2022-03-13 PROCEDURE — 11000001 HC ACUTE MED/SURG PRIVATE ROOM

## 2022-03-13 PROCEDURE — A4216 STERILE WATER/SALINE, 10 ML: HCPCS | Performed by: NEUROLOGICAL SURGERY

## 2022-03-13 PROCEDURE — 85025 COMPLETE CBC W/AUTO DIFF WBC: CPT | Performed by: STUDENT IN AN ORGANIZED HEALTH CARE EDUCATION/TRAINING PROGRAM

## 2022-03-13 RX ADMIN — OXYCODONE HYDROCHLORIDE 20 MG: 10 TABLET ORAL at 02:03

## 2022-03-13 RX ADMIN — HEPARIN SODIUM 5000 UNITS: 5000 INJECTION INTRAVENOUS; SUBCUTANEOUS at 02:03

## 2022-03-13 RX ADMIN — ACETAMINOPHEN 1000 MG: 325 TABLET ORAL at 01:03

## 2022-03-13 RX ADMIN — OXYCODONE HYDROCHLORIDE 20 MG: 10 TABLET ORAL at 08:03

## 2022-03-13 RX ADMIN — CEFTRIAXONE 2 G: 2 INJECTION, POWDER, FOR SOLUTION INTRAMUSCULAR; INTRAVENOUS at 03:03

## 2022-03-13 RX ADMIN — HEPARIN SODIUM 5000 UNITS: 5000 INJECTION INTRAVENOUS; SUBCUTANEOUS at 10:03

## 2022-03-13 RX ADMIN — SERTRALINE HYDROCHLORIDE 50 MG: 50 TABLET ORAL at 09:03

## 2022-03-13 RX ADMIN — SENNOSIDES AND DOCUSATE SODIUM 1 TABLET: 50; 8.6 TABLET ORAL at 09:03

## 2022-03-13 RX ADMIN — MICONAZOLE NITRATE 2 % TOPICAL POWDER: at 09:03

## 2022-03-13 RX ADMIN — Medication 10 ML: at 06:03

## 2022-03-13 RX ADMIN — HEPARIN SODIUM 5000 UNITS: 5000 INJECTION INTRAVENOUS; SUBCUTANEOUS at 05:03

## 2022-03-13 RX ADMIN — ACETAMINOPHEN 1000 MG: 325 TABLET ORAL at 06:03

## 2022-03-13 RX ADMIN — PREGABALIN 75 MG: 75 CAPSULE ORAL at 02:03

## 2022-03-13 RX ADMIN — OXYCODONE HYDROCHLORIDE 20 MG: 10 TABLET ORAL at 05:03

## 2022-03-13 RX ADMIN — OXYCODONE HYDROCHLORIDE 20 MG: 10 TABLET ORAL at 09:03

## 2022-03-13 RX ADMIN — Medication 10 ML: at 12:03

## 2022-03-13 RX ADMIN — FERROUS SULFATE TAB 325 MG (65 MG ELEMENTAL FE) 1 EACH: 325 (65 FE) TAB at 09:03

## 2022-03-13 RX ADMIN — PANTOPRAZOLE SODIUM 40 MG: 20 TABLET, DELAYED RELEASE ORAL at 09:03

## 2022-03-13 RX ADMIN — PREGABALIN 75 MG: 75 CAPSULE ORAL at 09:03

## 2022-03-13 RX ADMIN — Medication 10 ML: at 05:03

## 2022-03-13 RX ADMIN — PREGABALIN 75 MG: 75 CAPSULE ORAL at 08:03

## 2022-03-13 NOTE — PLAN OF CARE
"Ms. Zazueta is 40 y/o F with PMH of cirrhosis, Hep C, pancytopenia, polysubstance abuse, and epidural abscess s/p L3-L4 laminectomy and L3-L5 TLIF (4/2021; blood and surgical cultures positive for group B strep) admitted from [facility] clinic with progressive lumbar spondylodiscitis and hardware failure. IM6 consulted for preop risk stratification and medical optimization for liver cirrhosis.      Labs and vital signs reviewed. Pancytopenia is stable today, .     MELD-Na score: 9 at 3/13/2022  3:30 AM  MELD score: 9 at 3/13/2022  3:30 AM  Calculated from:  Serum Creatinine: 0.6 mg/dL (Using min of 1 mg/dL) at 3/13/2022  3:30 AM  Serum Sodium: 140 mmol/L (Using max of 137 mmol/L) at 3/13/2022  3:30 AM  Total Bilirubin: 0.8 mg/dL (Using min of 1 mg/dL) at 3/13/2022  3:30 AM  INR(ratio): 1.3 at 3/13/2022  3:30 AM  Age: 41 years       - daily MELD score assess for decompensation of cirhosis    - daily CMP, INR   - transfuse for of blood product per primary team, transfuse for Hgb <7  - transfuse for plt <10 or <50 with bleeding  - She has an appointment with hem/onc this month 3/22  - will need outpatient hepatology referral    - recommend placement on discharge for continued IV abx given hx of drug use and noncompliance with previous IV abx      Please secure chat [facility] ("Medicine Consult Only") or contact me directly for any additional questions or concerns    Sherri Palomo MD  Hospital Medicine Staff    "

## 2022-03-13 NOTE — PROGRESS NOTES
[facility] - Neurosurgery ([facility])  Neurosurgery  Progress Note    Subjective:     History of Present Illness: Rachel Zazueta is a 41 y.o.  female with PMHx of Hepatitis C, liver cirrhosis, pancytopenia, and polysubstance abuse, who presents to clinic for follow up with new imaging s/p L3 and L4 laminectomy for evacuation of epidural abscess and L3-L5 TLIF on 04/16/2021.     The pt c/o worsening back and left leg pain since October. She states that she is no longer using IV drugs. States only using marijuana. Describes the left leg pain as a shock down the leg when standing for 10 minutes. Denies taking any antibiotics. Endorses new weakness in the legs. Denies b/b dysfunction. Denies new neck pain. She ambulates with a walker at home. States that she smoke.    She presents to ED as direct admit from clinic.       Post-Op Info:  Procedure(s) (LRB):  FUSION, SPINE, LUMBAR (Bilateral)   11 Days Post-Op     Interval History: 3/13: NAEON. VSS. H/H stable today. Pt is asymptomatic and remains neuro stable. Rehab pending    Medications:  Continuous Infusions:   sodium chloride 0.9% Stopped (03/03/22 1708)     Scheduled Meds:   acetaminophen  1,000 mg Oral Q6H    alteplase  2 mg Intra-Catheter Once    cefTRIAXone (ROCEPHIN) IVPB  2 g Intravenous Q12H    ferrous sulfate  1 tablet Oral Daily    heparin (porcine)  5,000 Units Subcutaneous Q8H    LIDOcaine  1 patch Transdermal Q24H    miconazole NITRATE 2 %   Topical (Top) BID    pantoprazole  40 mg Oral Daily    pregabalin  75 mg Oral TID    senna-docusate 8.6-50 mg  1 tablet Oral Daily    sertraline  50 mg Oral Daily    sodium chloride 0.9%  10 mL Intravenous Q6H     PRN Meds:sodium chloride, sodium chloride, sodium chloride, aluminum-magnesium hydroxide-simethicone, dextrose 10%, dextrose 10%, glucagon (human recombinant), glucose, glucose, HYDROmorphone, melatonin, oxyCODONE, oxyCODONE, polyethylene glycol, Flushing PICC Protocol **AND** sodium chloride  0.9% **AND** sodium chloride 0.9%     Objective:     Weight: 133.8 kg (294 lb 15.6 oz)  Body mass index is 46.2 kg/m².  Vital Signs (Most Recent):  Temp: 98 °F (36.7 °C) (03/13/22 1145)  Pulse: 82 (03/13/22 1145)  Resp: 16 (03/13/22 1427)  BP: 119/69 (03/13/22 1145)  SpO2: 96 % (03/13/22 1145)   Vital Signs (24h Range):  Temp:  [97.7 °F (36.5 °C)-98.7 °F (37.1 °C)] 98 °F (36.7 °C)  Pulse:  [78-93] 82  Resp:  [14-18] 16  SpO2:  [96 %-100 %] 96 %  BP: (108-119)/(53-69) 119/69       Neurosurgery Physical Exam  General: Well developed, well nourished, not in acute distress.   Head: Normocephalic, atraumatic.  Neck: Full ROM.   Neurologic: Alert and oriented x 4. Thought content appropriate.  GCS: Motor:6  Verbal:5  Eyes:4   GCS Total: 15  Language: No aphasia.  Speech: No dysarthria.  Cranial nerves: Face symmetric, tongue midline, CN II-XII grossly intact.   Eyes: Pupils equal, round, reactive to light with accomodation, EOMI.   Pulmonary: Normal respirations, no signs of respiratory distress.  Abdomen: Soft, non-distended, non-tender to palpation.  Sensory: Intact to light touch throughout.  Motor Strength: Moves all extremities spontaneously with good tone. No abnormal movements seen.      Strength   Deltoids Triceps Biceps Wrist Extension Wrist Flexion Hand    Upper: R 5/5 5/5 5/5 5/5 5/5 5/5     L 5/5 5/5 5/5 5/5 5/5 5/5       Hip Flexion Knee Extension Knee  Flexion Dorsiflexion Plantar flexion EHL   Lower: R 5/5 5/5 5/5 5/5 5/5 5/5     L 5/5 5/5 5/5 5/5 5/5 5/5      Parker: Absent.  Clonus: 3 beats bilaterally.  Vascular: Bilateral LE pitting edema.   Skin: Skin is warm, dry and intact.     Posterior lumbar incision c/d/i with staples. No surrounding erythema or edema. No drainage from incision. No palpable hematoma or underlying fluid collection.        Significant Labs:  Recent Labs   Lab 03/12/22  0353 03/13/22  0330    90    140   K 3.8 3.6    110   CO2 27 24   BUN 14 16   CREATININE  0.6 0.6   CALCIUM 7.5* 7.8*     Recent Labs   Lab 03/12/22  0353 03/13/22  0330   WBC 1.08* 1.10*   HGB 7.8* 7.7*   HCT 25.7* 26.0*   PLT 47* 52*     Recent Labs   Lab 03/12/22  0353 03/13/22  0330   INR 1.2 1.3*     Microbiology Results (last 7 days)       Procedure Component Value Units Date/Time    Fungus culture [[id number]] Collected: 03/02/22 1228    Order Status: Completed Specimen: Back Updated: 03/09/22 1120     Fungus (Mycology) Culture Culture in progress    Narrative:      L5/S1 DISC    Fungus culture [[id number]] Collected: 02/21/22 1539    Order Status: Completed Specimen: Abscess from Back Updated: 03/09/22 0911     Fungus (Mycology) Culture Culture in progress      No fungus isolated after 2 weeks    Narrative:      Epidural purulence #1    Fungus culture [[id number]] Collected: 02/21/22 1539    Order Status: Completed Specimen: Abscess from Back Updated: 03/09/22 0911     Fungus (Mycology) Culture Culture in progress      No fungus isolated after 2 weeks    Narrative:      Epidural purulence #2    Culture, Anaerobe [[id number]] Collected: 03/02/22 1228    Order Status: Completed Specimen: Back Updated: 03/09/22 0729     Anaerobic Culture No anaerobes isolated    Narrative:      L5/S1 DISC          All pertinent labs from the last 24 hours have been reviewed.    Significant Diagnostics:  I have reviewed and interpreted all pertinent imaging results/findings within the past 24 hours.  No results found in the last 24 hours.      Assessment/Plan:     * Spondylodiscitis  Rachel Zazueta is a 41 F with PMH hepatitis C, cirrhosis, pancytopenia, nicotine abuse, hx IVDU (last use 2 years ago), strep agalactae bacteremia, s/p L3-5 TLIF on 4/16/2021 who presents with persistent and progressive lumbar spondylodiscitis with resultant hardware failure and new scoliosis.     S/p hardware removal on 2/21. Now s/p T10 t pelvis posterior instrumented fusion with L5-S1 TLIF on 3/2.     Plan:  --Patient is floor status.        -q4h neurochecks.  --Post op scoliosis obtained with good hardware placement.  --All labs and diagnostics personally reviewed.  --Follow-up MRI Brain, C/T/L w/wo contrast:   -No evidence of osteomyelitis, discitis, or epidural abscess within the cervical or thoracic spine   -No diffusion positive or abnormal enhancement within the brain   -MRI brain shows patchy nonspecific increased T2 and FLAIR signal in the periventricular and subcortical white matter bilaterally. Could be due to chronic microvascular ischemic changes vs demyelinating disease given patient's age. No evidence of active demyelination.   -DDD C4-7 with mild to moderate canal stenosis  --Full infectious w/u:   -Inflammatory markers wnl. BCx 2/14 NGTD, UA/Ucx pending. Wound Cx from 2/21 neg. Wound Cx from 3/2 NGTD.    -F/u ID recs - now on Rocephin x 8 weeks. PICC placed.    -Will need placement for IV abx therapy given h/o of IVDU.  --S/p L3-4 disc biopsy and L3 bone biopsy with IR 2/15 - Gram stain rare WBC. Cx NGTD.  --Utox + for amphetamines, addiction psych consulted per HM.   --Hold anti-plt/coag medications.  --Pain control: Oxy 10 q4h PRN for moderate pain, Oxy 20 q4h PRN for severe pain, Dilaudid 1 mg q4h PRN when pain not relieved by PO meds weaned to off. Continue lyrica 50 mg TID.  --F/u with hematology arranged for pancytopenia. Plt 48 this am; goal > 80k. H&H stable today Asymptomatic  --Lasix 40 mg IV given for LE edema. Self reports home Lasix 20 mg daily. Patient will need f/u with PCP for continued management. Denies shortness of breath, chest pain, calf pain.  --DVT ppx: TEDs/SCDs/SQH. BLE US on 3/9 negative.  --Bowel regimen: Miralax, senna. + BM.  --Activity: PT/OT OOB, TLSO brace when OOB.  --Continue to monitor clinically, notify NSGY immediately with any changes in neuro status.    Discussed with Dr. Templeton.    Dispo: Pending rehab authorization.        Lee Patel MD  Neurosurgery  [facility] - Neurosurgery  ([facility])

## 2022-03-13 NOTE — PLAN OF CARE
Problem: Adult Inpatient Plan of Care  Goal: Plan of Care Review  Outcome: Ongoing, Progressing  Goal: Patient-Specific Goal (Individualized)  Outcome: Ongoing, Progressing  Goal: Absence of Hospital-Acquired Illness or Injury  Outcome: Ongoing, Progressing  Goal: Optimal Comfort and Wellbeing  Outcome: Ongoing, Progressing  Goal: Readiness for Transition of Care  Outcome: Ongoing, Progressing     Problem: Bariatric Environmental Safety  Goal: Safety Maintained with Care  Outcome: Ongoing, Progressing     Problem: Fall Injury Risk  Goal: Absence of Fall and Fall-Related Injury  Outcome: Ongoing, Progressing     Problem: Skin Injury Risk Increased  Goal: Skin Health and Integrity  Outcome: Ongoing, Progressing     Problem: Infection  Goal: Absence of Infection Signs and Symptoms  Outcome: Ongoing, Progressing     Problem: Impaired Wound Healing  Goal: Optimal Wound Healing  Outcome: Ongoing, Progressing

## 2022-03-13 NOTE — SUBJECTIVE & OBJECTIVE
Interval History: 3/13: NAEON. VSS. H/H stable today. Pt is asymptomatic and remains neuro stable. Rehab pending    Medications:  Continuous Infusions:   sodium chloride 0.9% Stopped (03/03/22 1708)     Scheduled Meds:   acetaminophen  1,000 mg Oral Q6H    alteplase  2 mg Intra-Catheter Once    cefTRIAXone (ROCEPHIN) IVPB  2 g Intravenous Q12H    ferrous sulfate  1 tablet Oral Daily    heparin (porcine)  5,000 Units Subcutaneous Q8H    LIDOcaine  1 patch Transdermal Q24H    miconazole NITRATE 2 %   Topical (Top) BID    pantoprazole  40 mg Oral Daily    pregabalin  75 mg Oral TID    senna-docusate 8.6-50 mg  1 tablet Oral Daily    sertraline  50 mg Oral Daily    sodium chloride 0.9%  10 mL Intravenous Q6H     PRN Meds:sodium chloride, sodium chloride, sodium chloride, aluminum-magnesium hydroxide-simethicone, dextrose 10%, dextrose 10%, glucagon (human recombinant), glucose, glucose, HYDROmorphone, melatonin, oxyCODONE, oxyCODONE, polyethylene glycol, Flushing PICC Protocol **AND** sodium chloride 0.9% **AND** sodium chloride 0.9%     Objective:     Weight: 133.8 kg (294 lb 15.6 oz)  Body mass index is 46.2 kg/m².  Vital Signs (Most Recent):  Temp: 98 °F (36.7 °C) (03/13/22 1145)  Pulse: 82 (03/13/22 1145)  Resp: 16 (03/13/22 1427)  BP: 119/69 (03/13/22 1145)  SpO2: 96 % (03/13/22 1145)   Vital Signs (24h Range):  Temp:  [97.7 °F (36.5 °C)-98.7 °F (37.1 °C)] 98 °F (36.7 °C)  Pulse:  [78-93] 82  Resp:  [14-18] 16  SpO2:  [96 %-100 %] 96 %  BP: (108-119)/(53-69) 119/69       Neurosurgery Physical Exam  General: Well developed, well nourished, not in acute distress.   Head: Normocephalic, atraumatic.  Neck: Full ROM.   Neurologic: Alert and oriented x 4. Thought content appropriate.  GCS: Motor:6  Verbal:5  Eyes:4   GCS Total: 15  Language: No aphasia.  Speech: No dysarthria.  Cranial nerves: Face symmetric, tongue midline, CN II-XII grossly intact.   Eyes: Pupils equal, round, reactive to light with accomodation,  EOMI.   Pulmonary: Normal respirations, no signs of respiratory distress.  Abdomen: Soft, non-distended, non-tender to palpation.  Sensory: Intact to light touch throughout.  Motor Strength: Moves all extremities spontaneously with good tone. No abnormal movements seen.      Strength   Deltoids Triceps Biceps Wrist Extension Wrist Flexion Hand    Upper: R 5/5 5/5 5/5 5/5 5/5 5/5     L 5/5 5/5 5/5 5/5 5/5 5/5       Hip Flexion Knee Extension Knee  Flexion Dorsiflexion Plantar flexion EHL   Lower: R 5/5 5/5 5/5 5/5 5/5 5/5     L 5/5 5/5 5/5 5/5 5/5 5/5      Parker: Absent.  Clonus: 3 beats bilaterally.  Vascular: Bilateral LE pitting edema.   Skin: Skin is warm, dry and intact.     Posterior lumbar incision c/d/i with staples. No surrounding erythema or edema. No drainage from incision. No palpable hematoma or underlying fluid collection.        Significant Labs:  Recent Labs   Lab 03/12/22  0353 03/13/22  0330    90    140   K 3.8 3.6    110   CO2 27 24   BUN 14 16   CREATININE 0.6 0.6   CALCIUM 7.5* 7.8*     Recent Labs   Lab 03/12/22  0353 03/13/22  0330   WBC 1.08* 1.10*   HGB 7.8* 7.7*   HCT 25.7* 26.0*   PLT 47* 52*     Recent Labs   Lab 03/12/22  0353 03/13/22  0330   INR 1.2 1.3*     Microbiology Results (last 7 days)       Procedure Component Value Units Date/Time    Fungus culture [[id number]] Collected: 03/02/22 1228    Order Status: Completed Specimen: Back Updated: 03/09/22 1120     Fungus (Mycology) Culture Culture in progress    Narrative:      L5/S1 DISC    Fungus culture [[id number]] Collected: 02/21/22 1539    Order Status: Completed Specimen: Abscess from Back Updated: 03/09/22 0911     Fungus (Mycology) Culture Culture in progress      No fungus isolated after 2 weeks    Narrative:      Epidural purulence #1    Fungus culture [[id number]] Collected: 02/21/22 1539    Order Status: Completed Specimen: Abscess from Back Updated: 03/09/22 0911     Fungus (Mycology) Culture Culture  in progress      No fungus isolated after 2 weeks    Narrative:      Epidural purulence #2    Culture, Anaerobe [[id number]] Collected: 03/02/22 1228    Order Status: Completed Specimen: Back Updated: 03/09/22 0729     Anaerobic Culture No anaerobes isolated    Narrative:      L5/S1 DISC          All pertinent labs from the last 24 hours have been reviewed.    Significant Diagnostics:  I have reviewed and interpreted all pertinent imaging results/findings within the past 24 hours.  No results found in the last 24 hours.

## 2022-03-14 LAB
ALBUMIN SERPL BCP-MCNC: 2 G/DL (ref 3.5–5.2)
ALP SERPL-CCNC: 122 U/L (ref 55–135)
ALT SERPL W/O P-5'-P-CCNC: 22 U/L (ref 10–44)
ANION GAP SERPL CALC-SCNC: 6 MMOL/L (ref 8–16)
ANISOCYTOSIS BLD QL SMEAR: SLIGHT
AST SERPL-CCNC: 51 U/L (ref 10–40)
BASOPHILS # BLD AUTO: 0.01 K/UL (ref 0–0.2)
BASOPHILS NFR BLD: 1 % (ref 0–1.9)
BILIRUB SERPL-MCNC: 0.6 MG/DL (ref 0.1–1)
BUN SERPL-MCNC: 14 MG/DL (ref 6–20)
CALCIUM SERPL-MCNC: 7.9 MG/DL (ref 8.7–10.5)
CHLORIDE SERPL-SCNC: 109 MMOL/L (ref 95–110)
CO2 SERPL-SCNC: 26 MMOL/L (ref 23–29)
CREAT SERPL-MCNC: 0.6 MG/DL (ref 0.5–1.4)
DACRYOCYTES BLD QL SMEAR: ABNORMAL
DIFFERENTIAL METHOD: ABNORMAL
EOSINOPHIL # BLD AUTO: 0.1 K/UL (ref 0–0.5)
EOSINOPHIL NFR BLD: 5.8 % (ref 0–8)
ERYTHROCYTE [DISTWIDTH] IN BLOOD BY AUTOMATED COUNT: 16.6 % (ref 11.5–14.5)
EST. GFR  (AFRICAN AMERICAN): >60 ML/MIN/1.73 M^2
EST. GFR  (NON AFRICAN AMERICAN): >60 ML/MIN/1.73 M^2
GLUCOSE SERPL-MCNC: 108 MG/DL (ref 70–110)
HCT VFR BLD AUTO: 26.5 % (ref 37–48.5)
HGB BLD-MCNC: 8 G/DL (ref 12–16)
HYPOCHROMIA BLD QL SMEAR: ABNORMAL
IMM GRANULOCYTES # BLD AUTO: 0 K/UL (ref 0–0.04)
IMM GRANULOCYTES NFR BLD AUTO: 0 % (ref 0–0.5)
INR PPP: 1.3 (ref 0.8–1.2)
LYMPHOCYTES # BLD AUTO: 0.3 K/UL (ref 1–4.8)
LYMPHOCYTES NFR BLD: 25 % (ref 18–48)
MCH RBC QN AUTO: 29 PG (ref 27–31)
MCHC RBC AUTO-ENTMCNC: 30.2 G/DL (ref 32–36)
MCV RBC AUTO: 96 FL (ref 82–98)
MONOCYTES # BLD AUTO: 0.1 K/UL (ref 0.3–1)
MONOCYTES NFR BLD: 11.5 % (ref 4–15)
NEUTROPHILS # BLD AUTO: 0.6 K/UL (ref 1.8–7.7)
NEUTROPHILS NFR BLD: 56.7 % (ref 38–73)
NRBC BLD-RTO: 0 /100 WBC
OVALOCYTES BLD QL SMEAR: ABNORMAL
PLATELET # BLD AUTO: 48 K/UL (ref 150–450)
PLATELET BLD QL SMEAR: ABNORMAL
PMV BLD AUTO: 10.7 FL (ref 9.2–12.9)
POIKILOCYTOSIS BLD QL SMEAR: SLIGHT
POLYCHROMASIA BLD QL SMEAR: ABNORMAL
POTASSIUM SERPL-SCNC: 3.7 MMOL/L (ref 3.5–5.1)
PROT SERPL-MCNC: 5 G/DL (ref 6–8.4)
PROTHROMBIN TIME: 13.5 SEC (ref 9–12.5)
RBC # BLD AUTO: 2.76 M/UL (ref 4–5.4)
SARS-COV-2 RNA RESP QL NAA+PROBE: NOT DETECTED
SODIUM SERPL-SCNC: 141 MMOL/L (ref 136–145)
TARGETS BLD QL SMEAR: ABNORMAL
WBC # BLD AUTO: 1.04 K/UL (ref 3.9–12.7)

## 2022-03-14 PROCEDURE — 99024 PR POST-OP FOLLOW-UP VISIT: ICD-10-PCS | Mod: ,,, | Performed by: PHYSICIAN ASSISTANT

## 2022-03-14 PROCEDURE — 11000001 HC ACUTE MED/SURG PRIVATE ROOM

## 2022-03-14 PROCEDURE — 25000003 PHARM REV CODE 250

## 2022-03-14 PROCEDURE — 63600175 PHARM REV CODE 636 W HCPCS: Performed by: PHYSICIAN ASSISTANT

## 2022-03-14 PROCEDURE — U0005 INFEC AGEN DETEC AMPLI PROBE: HCPCS | Performed by: NEUROLOGICAL SURGERY

## 2022-03-14 PROCEDURE — 97530 THERAPEUTIC ACTIVITIES: CPT | Mod: CQ

## 2022-03-14 PROCEDURE — 25000003 PHARM REV CODE 250: Performed by: STUDENT IN AN ORGANIZED HEALTH CARE EDUCATION/TRAINING PROGRAM

## 2022-03-14 PROCEDURE — U0003 INFECTIOUS AGENT DETECTION BY NUCLEIC ACID (DNA OR RNA); SEVERE ACUTE RESPIRATORY SYNDROME CORONAVIRUS 2 (SARS-COV-2) (CORONAVIRUS DISEASE [COVID-19]), AMPLIFIED PROBE TECHNIQUE, MAKING USE OF HIGH THROUGHPUT TECHNOLOGIES AS DESCRIBED BY CMS-2020-01-R: HCPCS | Performed by: NEUROLOGICAL SURGERY

## 2022-03-14 PROCEDURE — C1751 CATH, INF, PER/CENT/MIDLINE: HCPCS

## 2022-03-14 PROCEDURE — 85610 PROTHROMBIN TIME: CPT

## 2022-03-14 PROCEDURE — 85025 COMPLETE CBC W/AUTO DIFF WBC: CPT | Performed by: STUDENT IN AN ORGANIZED HEALTH CARE EDUCATION/TRAINING PROGRAM

## 2022-03-14 PROCEDURE — 25000003 PHARM REV CODE 250: Performed by: NEUROLOGICAL SURGERY

## 2022-03-14 PROCEDURE — 80053 COMPREHEN METABOLIC PANEL: CPT

## 2022-03-14 PROCEDURE — 99024 POSTOP FOLLOW-UP VISIT: CPT | Mod: ,,, | Performed by: PHYSICIAN ASSISTANT

## 2022-03-14 PROCEDURE — 97116 GAIT TRAINING THERAPY: CPT | Mod: CQ

## 2022-03-14 PROCEDURE — A4216 STERILE WATER/SALINE, 10 ML: HCPCS | Performed by: NEUROLOGICAL SURGERY

## 2022-03-14 RX ADMIN — Medication 10 ML: at 06:03

## 2022-03-14 RX ADMIN — CEFTRIAXONE 2 G: 2 INJECTION, POWDER, FOR SOLUTION INTRAMUSCULAR; INTRAVENOUS at 03:03

## 2022-03-14 RX ADMIN — PREGABALIN 75 MG: 75 CAPSULE ORAL at 09:03

## 2022-03-14 RX ADMIN — MICONAZOLE NITRATE 2 % TOPICAL POWDER: at 09:03

## 2022-03-14 RX ADMIN — FERROUS SULFATE TAB 325 MG (65 MG ELEMENTAL FE) 1 EACH: 325 (65 FE) TAB at 09:03

## 2022-03-14 RX ADMIN — SERTRALINE HYDROCHLORIDE 50 MG: 50 TABLET ORAL at 09:03

## 2022-03-14 RX ADMIN — OXYCODONE HYDROCHLORIDE 20 MG: 10 TABLET ORAL at 02:03

## 2022-03-14 RX ADMIN — Medication 10 ML: at 12:03

## 2022-03-14 RX ADMIN — OXYCODONE HYDROCHLORIDE 20 MG: 10 TABLET ORAL at 09:03

## 2022-03-14 RX ADMIN — PANTOPRAZOLE SODIUM 40 MG: 20 TABLET, DELAYED RELEASE ORAL at 09:03

## 2022-03-14 RX ADMIN — OXYCODONE HYDROCHLORIDE 20 MG: 10 TABLET ORAL at 06:03

## 2022-03-14 RX ADMIN — OXYCODONE HYDROCHLORIDE 20 MG: 10 TABLET ORAL at 03:03

## 2022-03-14 RX ADMIN — ACETAMINOPHEN 1000 MG: 325 TABLET ORAL at 06:03

## 2022-03-14 RX ADMIN — OXYCODONE HYDROCHLORIDE 20 MG: 10 TABLET ORAL at 10:03

## 2022-03-14 RX ADMIN — Medication 10 ML: at 01:03

## 2022-03-14 RX ADMIN — SENNOSIDES AND DOCUSATE SODIUM 1 TABLET: 50; 8.6 TABLET ORAL at 09:03

## 2022-03-14 RX ADMIN — PREGABALIN 75 MG: 75 CAPSULE ORAL at 03:03

## 2022-03-14 RX ADMIN — ACETAMINOPHEN 1000 MG: 325 TABLET ORAL at 01:03

## 2022-03-14 NOTE — PROGRESS NOTES
[facility] Ca - Neurosurgery ([facility])  Neurosurgery  Progress Note    Subjective:     History of Present Illness: Rachel Zazueta is a 41 y.o.  female with PMHx of Hepatitis C, liver cirrhosis, pancytopenia, and polysubstance abuse, who presents to clinic for follow up with new imaging s/p L3 and L4 laminectomy for evacuation of epidural abscess and L3-L5 TLIF on 04/16/2021.     The pt c/o worsening back and left leg pain since October. She states that she is no longer using IV drugs. States only using marijuana. Describes the left leg pain as a shock down the leg when standing for 10 minutes. Denies taking any antibiotics. Endorses new weakness in the legs. Denies b/b dysfunction. Denies new neck pain. She ambulates with a walker at home. States that she smoke.    She presents to ED as direct admit from clinic.       Post-Op Info:  Procedure(s) (LRB):  FUSION, SPINE, LUMBAR (Bilateral)   12 Days Post-Op     Interval History: NAEON. Neuro stable. Denies weakness or numbness. Plan for discharge to rehab tomorrow.     Medications:  Continuous Infusions:   sodium chloride 0.9% Stopped (03/03/22 1708)     Scheduled Meds:   acetaminophen  1,000 mg Oral Q6H    alteplase  2 mg Intra-Catheter Once    cefTRIAXone (ROCEPHIN) IVPB  2 g Intravenous Q12H    ferrous sulfate  1 tablet Oral Daily    heparin (porcine)  5,000 Units Subcutaneous Q8H    LIDOcaine  1 patch Transdermal Q24H    miconazole NITRATE 2 %   Topical (Top) BID    pantoprazole  40 mg Oral Daily    pregabalin  75 mg Oral TID    senna-docusate 8.6-50 mg  1 tablet Oral Daily    sertraline  50 mg Oral Daily    sodium chloride 0.9%  10 mL Intravenous Q6H     PRN Meds:sodium chloride, sodium chloride, sodium chloride, aluminum-magnesium hydroxide-simethicone, dextrose 10%, dextrose 10%, glucagon (human recombinant), glucose, glucose, HYDROmorphone, melatonin, oxyCODONE, oxyCODONE, polyethylene glycol, Flushing PICC Protocol **AND** sodium chloride 0.9%  **AND** sodium chloride 0.9%     Review of Systems  Objective:     Weight: 133.8 kg (294 lb 15.6 oz)  Body mass index is 46.2 kg/m².  Vital Signs (Most Recent):  Temp: 96.5 °F (35.8 °C) (03/14/22 1142)  Pulse: 88 (03/14/22 1142)  Resp: 16 (03/14/22 1545)  BP: 133/72 (03/14/22 1142)  SpO2: 98 % (03/14/22 1142) Vital Signs (24h Range):  Temp:  [96.2 °F (35.7 °C)-98.2 °F (36.8 °C)] 96.5 °F (35.8 °C)  Pulse:  [82-99] 88  Resp:  [16-18] 16  SpO2:  [97 %-98 %] 98 %  BP: (123-133)/(61-72) 133/72                     Neurosurgery Physical Exam  General: Well developed, well nourished, not in acute distress.   Head: Normocephalic, atraumatic.  Neck: Full ROM.   Neurologic: Alert and oriented x 4. Thought content appropriate.  GCS: Motor:6  Verbal:5  Eyes:4   GCS Total: 15  Language: No aphasia.  Speech: No dysarthria.  Cranial nerves: Face symmetric, tongue midline, CN II-XII grossly intact.   Eyes: Pupils equal, round, reactive to light with accomodation, EOMI.   Pulmonary: Normal respirations, no signs of respiratory distress.  Abdomen: Soft, non-distended, non-tender to palpation.  Sensory: Intact to light touch throughout.  Motor Strength: Moves all extremities spontaneously with good tone. No abnormal movements seen.      Strength   Deltoids Triceps Biceps Wrist Extension Wrist Flexion Hand    Upper: R 5/5 5/5 5/5 5/5 5/5 5/5     L 5/5 5/5 5/5 5/5 5/5 5/5       Hip Flexion Knee Extension Knee  Flexion Dorsiflexion Plantar flexion EHL   Lower: R 5/5 5/5 5/5 5/5 5/5 5/5     L 5/5 5/5 5/5 5/5 5/5 5/5      Parker: Absent.    Vascular: Bilateral LE pitting edema.   Skin: Skin is warm, dry and intact.     Posterior lumbar incision c/d/i with staples. No surrounding erythema or edema. No drainage from incision. No palpable hematoma or underlying fluid collection.       Significant Labs:  Recent Labs   Lab 03/13/22  0330 03/14/22  0514   GLU 90 108    141   K 3.6 3.7    109   CO2 24 26   BUN 16 14   CREATININE  0.6 0.6   CALCIUM 7.8* 7.9*     Recent Labs   Lab 03/13/22  0330 03/14/22  0514   WBC 1.10* 1.04*   HGB 7.7* 8.0*   HCT 26.0* 26.5*   PLT 52* 48*     Recent Labs   Lab 03/13/22  0330 03/14/22  0514   INR 1.3* 1.3*     Microbiology Results (last 7 days)       Procedure Component Value Units Date/Time    Fungus culture [[id number]] Collected: 03/02/22 1228    Order Status: Completed Specimen: Back Updated: 03/09/22 1120     Fungus (Mycology) Culture Culture in progress    Narrative:      L5/S1 DISC    Fungus culture [[id number]] Collected: 02/21/22 1539    Order Status: Completed Specimen: Abscess from Back Updated: 03/09/22 0911     Fungus (Mycology) Culture Culture in progress      No fungus isolated after 2 weeks    Narrative:      Epidural purulence #1    Fungus culture [[id number]] Collected: 02/21/22 1539    Order Status: Completed Specimen: Abscess from Back Updated: 03/09/22 0911     Fungus (Mycology) Culture Culture in progress      No fungus isolated after 2 weeks    Narrative:      Epidural purulence #2    Culture, Anaerobe [[id number]] Collected: 03/02/22 1228    Order Status: Completed Specimen: Back Updated: 03/09/22 0729     Anaerobic Culture No anaerobes isolated    Narrative:      L5/S1 DISC          All pertinent labs from the last 24 hours have been reviewed.    Significant Diagnostics:  I have reviewed all pertinent imaging results/findings within the past 24 hours.    Assessment/Plan:     * Spondylodiscitis  Rachel Zazueta is a 41 F with PMH hepatitis C, cirrhosis, pancytopenia, nicotine abuse, hx IVDU (last use 2 years ago), strep agalactae bacteremia, s/p L3-5 TLIF on 4/16/2021 who presents with persistent and progressive lumbar spondylodiscitis with resultant hardware failure and new scoliosis.     S/p hardware removal on 2/21. Now s/p T10 t pelvis posterior instrumented fusion with L5-S1 TLIF on 3/2.     Plan:  --Patient is floor status.       -q4h neurochecks.  --Post op scoliosis obtained  with good hardware placement.  --All labs and diagnostics personally reviewed.  --Follow-up MRI Brain, C/T/L w/wo contrast:   -No evidence of osteomyelitis, discitis, or epidural abscess within the cervical or thoracic spine   -No diffusion positive or abnormal enhancement within the brain   -MRI brain shows patchy nonspecific increased T2 and FLAIR signal in the periventricular and subcortical white matter bilaterally. Could be due to chronic microvascular ischemic changes vs demyelinating disease given patient's age. No evidence of active demyelination.   -DDD C4-7 with mild to moderate canal stenosis  --Full infectious w/u:   -Inflammatory markers wnl. BCx 2/14 NGTD, UA/Ucx pending. Wound Cx from 2/21 neg. Wound Cx from 3/2 NGTD.    -F/u ID recs - now on Rocephin x 8 weeks. PICC placed.    -Will need placement for IV abx therapy given h/o of IVDU.  --S/p L3-4 disc biopsy and L3 bone biopsy with IR 2/15 - Gram stain rare WBC. Cx NGTD.  --Utox + for amphetamines, addiction psych consulted per HM.   --Hold anti-plt/coag medications.  --Pain control: Oxy 10 q4h PRN for moderate pain, Oxy 20 q4h PRN for severe pain, Dilaudid 1 mg q4h PRN when pain not relieved by PO meds weaned to off. Continue lyrica 50 mg TID.  --F/u with hematology arranged for pancytopenia. Plt 48 this am; goal > 80k. H&H stable today Asymptomatic  --Lasix 40 mg IV given for LE edema. Self reports home Lasix 20 mg daily. Patient will need f/u with PCP for continued management. Denies shortness of breath, chest pain, calf pain.  --DVT ppx: TEDs/SCDs. BLE US on 3/9 negative. SQH dc'd in setting of thrombocytopenia and patient is very mobile.   --Bowel regimen: Miralax, senna. + BM.  --Activity: PT/OT OOB, TLSO brace when OOB.  --Continue to monitor clinically, notify NSGY immediately with any changes in neuro status.    Discussed with Dr. Templeton.     Dispo: Plan for discharge readmit tomorrow.         SILVANA ZieglerC  Neurosurgery  [facility] Ca -  Neurosurgery ([facility])

## 2022-03-14 NOTE — SUBJECTIVE & OBJECTIVE
Interval History: NAEON. Neuro stable. Denies weakness or numbness. Plan for discharge to rehab tomorrow.     Medications:  Continuous Infusions:   sodium chloride 0.9% Stopped (03/03/22 1708)     Scheduled Meds:   acetaminophen  1,000 mg Oral Q6H    alteplase  2 mg Intra-Catheter Once    cefTRIAXone (ROCEPHIN) IVPB  2 g Intravenous Q12H    ferrous sulfate  1 tablet Oral Daily    heparin (porcine)  5,000 Units Subcutaneous Q8H    LIDOcaine  1 patch Transdermal Q24H    miconazole NITRATE 2 %   Topical (Top) BID    pantoprazole  40 mg Oral Daily    pregabalin  75 mg Oral TID    senna-docusate 8.6-50 mg  1 tablet Oral Daily    sertraline  50 mg Oral Daily    sodium chloride 0.9%  10 mL Intravenous Q6H     PRN Meds:sodium chloride, sodium chloride, sodium chloride, aluminum-magnesium hydroxide-simethicone, dextrose 10%, dextrose 10%, glucagon (human recombinant), glucose, glucose, HYDROmorphone, melatonin, oxyCODONE, oxyCODONE, polyethylene glycol, Flushing PICC Protocol **AND** sodium chloride 0.9% **AND** sodium chloride 0.9%     Review of Systems  Objective:     Weight: 133.8 kg (294 lb 15.6 oz)  Body mass index is 46.2 kg/m².  Vital Signs (Most Recent):  Temp: 96.5 °F (35.8 °C) (03/14/22 1142)  Pulse: 88 (03/14/22 1142)  Resp: 16 (03/14/22 1545)  BP: 133/72 (03/14/22 1142)  SpO2: 98 % (03/14/22 1142) Vital Signs (24h Range):  Temp:  [96.2 °F (35.7 °C)-98.2 °F (36.8 °C)] 96.5 °F (35.8 °C)  Pulse:  [82-99] 88  Resp:  [16-18] 16  SpO2:  [97 %-98 %] 98 %  BP: (123-133)/(61-72) 133/72                     Neurosurgery Physical Exam  General: Well developed, well nourished, not in acute distress.   Head: Normocephalic, atraumatic.  Neck: Full ROM.   Neurologic: Alert and oriented x 4. Thought content appropriate.  GCS: Motor:6  Verbal:5  Eyes:4   GCS Total: 15  Language: No aphasia.  Speech: No dysarthria.  Cranial nerves: Face symmetric, tongue midline, CN II-XII grossly intact.   Eyes: Pupils equal, round, reactive to  light with accomodation, EOMI.   Pulmonary: Normal respirations, no signs of respiratory distress.  Abdomen: Soft, non-distended, non-tender to palpation.  Sensory: Intact to light touch throughout.  Motor Strength: Moves all extremities spontaneously with good tone. No abnormal movements seen.      Strength   Deltoids Triceps Biceps Wrist Extension Wrist Flexion Hand    Upper: R 5/5 5/5 5/5 5/5 5/5 5/5     L 5/5 5/5 5/5 5/5 5/5 5/5       Hip Flexion Knee Extension Knee  Flexion Dorsiflexion Plantar flexion EHL   Lower: R 5/5 5/5 5/5 5/5 5/5 5/5     L 5/5 5/5 5/5 5/5 5/5 5/5      Parker: Absent.    Vascular: Bilateral LE pitting edema.   Skin: Skin is warm, dry and intact.     Posterior lumbar incision c/d/i with staples. No surrounding erythema or edema. No drainage from incision. No palpable hematoma or underlying fluid collection.       Significant Labs:  Recent Labs   Lab 03/13/22  0330 03/14/22  0514   GLU 90 108    141   K 3.6 3.7    109   CO2 24 26   BUN 16 14   CREATININE 0.6 0.6   CALCIUM 7.8* 7.9*     Recent Labs   Lab 03/13/22  0330 03/14/22  0514   WBC 1.10* 1.04*   HGB 7.7* 8.0*   HCT 26.0* 26.5*   PLT 52* 48*     Recent Labs   Lab 03/13/22  0330 03/14/22  0514   INR 1.3* 1.3*     Microbiology Results (last 7 days)       Procedure Component Value Units Date/Time    Fungus culture [[id number]] Collected: 03/02/22 1228    Order Status: Completed Specimen: Back Updated: 03/09/22 1120     Fungus (Mycology) Culture Culture in progress    Narrative:      L5/S1 DISC    Fungus culture [[id number]] Collected: 02/21/22 1539    Order Status: Completed Specimen: Abscess from Back Updated: 03/09/22 0911     Fungus (Mycology) Culture Culture in progress      No fungus isolated after 2 weeks    Narrative:      Epidural purulence #1    Fungus culture [[id number]] Collected: 02/21/22 1539    Order Status: Completed Specimen: Abscess from Back Updated: 03/09/22 0911     Fungus (Mycology) Culture Culture in  progress      No fungus isolated after 2 weeks    Narrative:      Epidural purulence #2    Culture, Anaerobe [[id number]] Collected: 03/02/22 1228    Order Status: Completed Specimen: Back Updated: 03/09/22 0729     Anaerobic Culture No anaerobes isolated    Narrative:      L5/S1 DISC          All pertinent labs from the last 24 hours have been reviewed.    Significant Diagnostics:  I have reviewed all pertinent imaging results/findings within the past 24 hours.

## 2022-03-14 NOTE — PROGRESS NOTES
"Roldan Ca - Neurosurgery ([facility])  Adult Nutrition  Progress Note    SUMMARY       Recommendations    1. Suggest low sodium diet as tolerated.      2. If po intake <50%, recommend adding Optisource ONS BID.      3. RD following.     Goals: continue to consume % of meals by RD follow-up  Nutrition Goal Status: goal met  Communication of RD Recs:  (POC)    Assessment and Plan     Nutrition Problem  Increased Nutrient Needs: Protein      Related to (etiology):   Physiological demands       Signs and Symptoms (as evidenced by):   Spondylodiscitis     Interventions (treatment strategy):  Collaboration of nutrition care with other providers      Nutrition Diagnosis Status:   Continues       Reason for Assessment    Reason For Assessment: RD follow-up  Diagnosis:  (Spondylodiscitis)  Relevant Medical History: cirrhosis, Hep C, pancytopenia, polysubstance abuse, epidural abscess s/p L3-L4 laminectomy and L3-L5 TLIF (4/2021  Interdisciplinary Rounds: did not attend  General Information Comments:  S/p T10-Pelvis posterior instrumented fusion with L5-S1 TLIF. Pt continues with good po intake with no N/V. Per chart review, pt consuming 100% of meals. Visual NFPE completed 2/25, pt appears nourished. Rehab pending.   Nutrition Discharge Planning: Low Sodium Diet    Nutrition Risk Screen    Nutrition Risk Screen: no indicators present    Nutrition/Diet History    Spiritual, Cultural Beliefs, Muslim Practices, Values that Affect Care: no  Factors Affecting Nutritional Intake: None identified at this time    Anthropometrics    Temp: 96.2 °F (35.7 °C)  Height Method: Stated  Height: 5' 7" (170.2 cm)  Height (inches): 67 in  Weight Method: Bed Scale  Weight: 133.8 kg (294 lb 15.6 oz)  Weight (lb): 294.98 lb  Ideal Body Weight (IBW), Female: 135 lb  % Ideal Body Weight, Female (lb): 218.5 %  BMI (Calculated): 46.2  BMI Grade: greater than 40 - morbid obesity     Lab/Procedures/Meds    Pertinent Labs Reviewed: " reviewed  Pertinent Labs Comments: AST 51  Pertinent Medications Reviewed: reviewed  Pertinent Medications Comments: acetaminophen, ferrous sulfate, pantoprazole, senna-docusate, IVF     Estimated/Assessed Needs    Weight Used For Calorie Calculations: 133.8 kg (294 lb 15.6 oz)  Energy Calorie Requirements (kcal): 2035 kcal/day  Energy Need Method: Charles-St Jeor (no AF 2/2 obesity)  Protein Requirements: 107-134 gm/day (0.8-1.0 gm/kg)  Weight Used For Protein Calculations: 133.8 kg (294 lb 15.6 oz)  Fluid Requirements (mL): 1 mL/kcal or per MD     RDA Method (mL): 2035     Nutrition Prescription Ordered    Current Diet Order: Regular    Evaluation of Received Nutrient/Fluid Intake    I/O: +4.846L since 2/28  Comments: LBM 3/11  Tolerance: tolerating  % Intake of Estimated Energy Needs: 75 - 100 %  % Meal Intake: 75 - 100 %    Nutrition Risk    Level of Risk/Frequency of Follow-up:  (f/u 1 x wk)     Monitor and Evaluation    Food and Nutrient Intake: energy intake, food and beverage intake  Food and Nutrient Adminstration: diet order  Physical Activity and Function: nutrition-related ADLs and IADLs  Anthropometric Measurements: weight, weight change, body mass index  Biochemical Data, Medical Tests and Procedures: electrolyte and renal panel, gastrointestinal profile, glucose/endocrine profile, inflammatory profile, lipid profile  Nutrition-Focused Physical Findings: overall appearance     Nutrition Follow-Up    RD Follow-up?: Yes

## 2022-03-14 NOTE — PLAN OF CARE
[facility] - Neurosurgery (Hospital)  Discharge Reassessment    Primary Care Provider: Dannielle Merino DO    Expected Discharge Date: 3/15/2022     Patient is medically ready for discahrge and has been accepted by [facility].  Patient is pending auth.  CM escalated auth to supervisor as auth has been pending for several days.    Reassessment (most recent)     Discharge Reassessment - 03/14/22 1205        Discharge Reassessment    Assessment Type Discharge Planning Reassessment     Did the patient's condition or plan change since previous assessment? No     Discharge Plan discussed with: Patient     Communicated SHIRA with patient/caregiver Yes     Discharge Plan A Rehab     Discharge Plan B Home with family     DME Needed Upon Discharge  none     Discharge Barriers Identified None     Why the patient remains in the hospital Insurance issues        Post-Acute Status    Post-Acute Authorization Placement     Post-Acute Placement Status Pending payor review/awaiting authorization (if required)     Discharge Delays None known at this time

## 2022-03-14 NOTE — PLAN OF CARE
"Ms. Zzaueta is 40 y/o F with PMH of cirrhosis, Hep C, pancytopenia, polysubstance abuse, and epidural abscess s/p L3-L4 laminectomy and L3-L5 TLIF (4/2021; blood and surgical cultures positive for group B strep) [place] [facility] [place] progressive lumbar spondylodiscitis and hardware failure. IM6 consulted for preop risk stratification and medical optimization for liver cirrhosis.      Labs and vital signs reviewed. Pancytopenia is stable today, .     MELD-Na score: 9 at 3/14/2022  5:14 AM  MELD score: 9 at 3/14/2022  5:14 AM  Calculated from:  Serum Creatinine: 0.6 mg/dL (Using min of 1 mg/dL) at 3/14/2022  5:14 AM  Serum Sodium: 141 mmol/L (Using max of 137 mmol/L) at 3/14/2022  5:14 AM  Total Bilirubin: 0.6 mg/dL (Using min of 1 mg/dL) at 3/14/2022  5:14 AM  INR(ratio): 1.3 at 3/14/2022  5:14 AM  Age: 41 years      - daily MELD score assess for decompensation of cirrhosis, stable  - daily CMP, INR   - transfuse for of blood product per primary team, transfuse for Hgb <7  - transfuse for plt <10 or <50 with bleeding  - She has an appointment with hem/onc this month 3/22  - will need outpatient hepatology referral    - recommend placement on discharge for continued IV abx given hx of drug use and noncompliance with previous IV abx      Please secure [facility]  ("Medicine Consult Only") or contact me directly for any additional questions or concerns    Britt Spivey MD  PGY-2, IM  "

## 2022-03-14 NOTE — NURSING
0643 Notified mandi Mackenzie of critical  WBC and current platelet count. Hold this dose of heparin per ROSA ELENA Mackenzie Neurosurgery

## 2022-03-14 NOTE — PT/OT/SLP PROGRESS
Physical Therapy Treatment    Patient Name:  Rachel Zazueta   MRN:  [id number]    Recommendations:     Discharge Recommendations:  rehabilitation facility   Discharge Equipment Recommendations: walker, rolling   Barriers to discharge: Inaccessible home and Decreased caregiver support    Assessment:     Rachel Zazueta is a 41 y.o. female admitted with a medical diagnosis of Spondylodiscitis.  She presents with the following impairments/functional limitations:  weakness, impaired endurance, impaired self care skills, impaired functional mobilty, gait instability, impaired balance, decreased lower extremity function, decreased safety awareness, pain, decreased ROM, decreased coordination, orthopedic precautions. Pt tolerates session well with focus on bed mobility, transfers, and gait training. Pt remains most limited with safety and stability with functional activity. Pt able to perform general functional mobility well with little to no physical assistance. Pt unable to safely progress to dynamic gait or engage in retrieval of personal needs while using AD d/t pts reliance on BUE support through AD. Pt unable to care for herself independently in home environment, her home remains inaccessible d/t inability to transport her AD up/down stair with her, and she lacks consistent caregiver support. Pt will benefit from the inpatient rehabilitation to best maximize her return to functional independence. Pt will continue to benefit from skilled therapy services to address impairments listed above.     Rehab Prognosis: Good; patient would benefit from acute skilled PT services to address these deficits and reach maximum level of function.    Recent Surgery: Procedure(s) (LRB):  FUSION, SPINE, LUMBAR (Bilateral) 12 Days Post-Op    Plan:     During this hospitalization, patient to be seen 3 x/week to address the identified rehab impairments via gait training, therapeutic activities, therapeutic exercises, neuromuscular  re-education and progress toward the following goals:    Plan of Care Expires:  03/24/22    Subjective     Chief Complaint: pain  Pain/Comfort:  Pain Rating 1: other (see comments) (unrated)  Location - Orientation 1: generalized  Location 1: back  Pain Addressed 1: Reposition, Distraction, Other (see comments) (RN in to room to administer pain medicine during session)  Pain Rating Post-Intervention 1: other (see comments) (unrated)      Objective:     Communicated with RN prior to session.  Patient found left sidelying with  [place] upon PTA entry to room.     General Precautions: Standard, fall   Orthopedic Precautions:spinal precautions   Braces: TLSO  Respiratory Status: Room air     Functional Mobility:  Bed Mobility:     Supine to Sit: modified independence  Transfers:     Sit to Stand:  stand by assistance with rolling walker  Bed to Chair: contact guard assistance with  hand-held assist  using  Step Transfer  Gait: Pt ambulates ~186 ft with RW and CGA to Min A. Pt engages in dynamic gait following stair training with increased instability noted. Pt in straight line demonstrates downward gaze, slight flexed posture, and reliance on BUE support. In dynamic gait with head turns and dual tasking pt demonstrates inability to maintain forward gaze, and begins to laterally drift to R side of hallway.   Stairs:  Pt ascended/descended 5 stair(s) with No Assistive Device with left handrail with Contact Guard Assistance. Extremely slow progression, pt hesitant to descend.       AM-PAC 6 CLICK MOBILITY  Turning over in bed (including adjusting bedclothes, sheets and blankets)?: 4  Sitting down on and standing up from a chair with arms (e.g., wheelchair, bedside commode, etc.): 3  Moving from lying on back to sitting on the side of the bed?: 4  Moving to and from a bed to a chair (including a wheelchair)?: 3  Need to walk in hospital room?: 3  Climbing 3-5 steps with a railing?: 3  Basic Mobility Total Score:  20     Patient left up in chair with all lines intact and call button in reach..    GOALS:   Multidisciplinary Problems       Physical Therapy Goals          Problem: Physical Therapy Goal    Goal Priority Disciplines Outcome Goal Variances Interventions   Physical Therapy Goal     PT, PT/OT Ongoing, Progressing     Description: Goals to be met by: 3/18/2022     Patient will increase functional independence with mobility by performin. Supine to sit with Supervision  2. Sit to supine with Supervision  3. Rolling to Left and Right with Supervision  4. Sit to stand transfer with Stand-by Assistance and RW  5. Bed to chair transfer with Minimal Assistance using Rolling Walker                         Time Tracking:     PT Received On: 22  PT Start Time: 1033     PT Stop Time: 1103  PT Total Time (min): 30 min     Billable Minutes: Gait Training 20 and Therapeutic Activity 10    Treatment Type: Treatment  PT/PTA: PTA     PTA Visit Number: 4     2022

## 2022-03-14 NOTE — ASSESSMENT & PLAN NOTE
Rachel Zazueta is a 41 F with PMH hepatitis C, cirrhosis, pancytopenia, nicotine abuse, hx IVDU (last use 2 years ago), strep agalactae bacteremia, s/p L3-5 TLIF on 4/16/2021 who presents with persistent and progressive lumbar spondylodiscitis with resultant hardware failure and new scoliosis.     S/p hardware removal on 2/21. Now s/p T10 t pelvis posterior instrumented fusion with L5-S1 TLIF on 3/2.     Plan:  --Patient is floor status.       -q4h neurochecks.  --Post op scoliosis obtained with good hardware placement.  --All labs and diagnostics personally reviewed.  --Follow-up MRI Brain, C/T/L w/wo contrast:   -No evidence of osteomyelitis, discitis, or epidural abscess within the cervical or thoracic spine   -No diffusion positive or abnormal enhancement within the brain   -MRI brain shows patchy nonspecific increased T2 and FLAIR signal in the periventricular and subcortical white matter bilaterally. Could be due to chronic microvascular ischemic changes vs demyelinating disease given patient's age. No evidence of active demyelination.   -DDD C4-7 with mild to moderate canal stenosis  --Full infectious w/u:   -Inflammatory markers wnl. BCx 2/14 NGTD, UA/Ucx pending. Wound Cx from 2/21 neg. Wound Cx from 3/2 NGTD.    -F/u ID recs - now on Rocephin x 8 weeks. PICC placed.    -Will need placement for IV abx therapy given h/o of IVDU.  --S/p L3-4 disc biopsy and L3 bone biopsy with IR 2/15 - Gram stain rare WBC. Cx NGTD.  --Utox + for amphetamines, addiction psych consulted per .   --Hold anti-plt/coag medications.  --Pain control: Oxy 10 q4h PRN for moderate pain, Oxy 20 q4h PRN for severe pain, Dilaudid 1 mg q4h PRN when pain not relieved by PO meds weaned to off. Continue lyrica 50 mg TID.  --F/u with hematology arranged for pancytopenia. Plt 48 this am; goal > 80k. H&H stable today Asymptomatic  --Lasix 40 mg IV given for LE edema. Self reports home Lasix 20 mg daily. Patient will need f/u with PCP for  continued management. Denies shortness of breath, chest pain, calf pain.  --DVT ppx: TEDs/SCDs. BLE US on 3/9 negative. SQH dc'd in setting of thrombocytopenia and patient is very mobile.   --Bowel regimen: Miralax, senna. + BM.  --Activity: PT/OT OOB, TLSO brace when OOB.  --Continue to monitor clinically, notify NSGY immediately with any changes in neuro status.    Discussed with Dr. Templeton.     Dispo: Plan for discharge readmit tomorrow.

## 2022-03-15 ENCOUNTER — HOSPITAL ENCOUNTER (INPATIENT)
Facility: HOSPITAL | Age: 42
LOS: 7 days | Discharge: HOME HEALTH | DRG: 551 | End: 2022-03-22
Attending: INTERNAL MEDICINE | Admitting: INTERNAL MEDICINE
Payer: MEDICAID

## 2022-03-15 VITALS
SYSTOLIC BLOOD PRESSURE: 122 MMHG | OXYGEN SATURATION: 95 % | HEIGHT: 67 IN | DIASTOLIC BLOOD PRESSURE: 59 MMHG | BODY MASS INDEX: 45.99 KG/M2 | TEMPERATURE: 98 F | WEIGHT: 293 LBS | RESPIRATION RATE: 18 BRPM | HEART RATE: 91 BPM

## 2022-03-15 DIAGNOSIS — M46.40 SPONDYLODISCITIS: ICD-10-CM

## 2022-03-15 DIAGNOSIS — R60.9 EDEMA, UNSPECIFIED TYPE: Primary | ICD-10-CM

## 2022-03-15 LAB
ALBUMIN SERPL BCP-MCNC: 2.1 G/DL (ref 3.5–5.2)
ALP SERPL-CCNC: 133 U/L (ref 55–135)
ALT SERPL W/O P-5'-P-CCNC: 23 U/L (ref 10–44)
ANION GAP SERPL CALC-SCNC: 6 MMOL/L (ref 8–16)
ANISOCYTOSIS BLD QL SMEAR: SLIGHT
AST SERPL-CCNC: 54 U/L (ref 10–40)
BASOPHILS # BLD AUTO: 0.01 K/UL (ref 0–0.2)
BASOPHILS NFR BLD: 0.8 % (ref 0–1.9)
BILIRUB SERPL-MCNC: 0.7 MG/DL (ref 0.1–1)
BUN SERPL-MCNC: 12 MG/DL (ref 6–20)
CALCIUM SERPL-MCNC: 8 MG/DL (ref 8.7–10.5)
CHLORIDE SERPL-SCNC: 109 MMOL/L (ref 95–110)
CO2 SERPL-SCNC: 27 MMOL/L (ref 23–29)
CREAT SERPL-MCNC: 0.6 MG/DL (ref 0.5–1.4)
DACRYOCYTES BLD QL SMEAR: ABNORMAL
DIFFERENTIAL METHOD: ABNORMAL
EOSINOPHIL # BLD AUTO: 0.1 K/UL (ref 0–0.5)
EOSINOPHIL NFR BLD: 7.1 % (ref 0–8)
ERYTHROCYTE [DISTWIDTH] IN BLOOD BY AUTOMATED COUNT: 16 % (ref 11.5–14.5)
EST. GFR  (AFRICAN AMERICAN): >60 ML/MIN/1.73 M^2
EST. GFR  (NON AFRICAN AMERICAN): >60 ML/MIN/1.73 M^2
FUNGUS SPEC CULT: NORMAL
GLUCOSE SERPL-MCNC: 90 MG/DL (ref 70–110)
HCT VFR BLD AUTO: 26.9 % (ref 37–48.5)
HGB BLD-MCNC: 8.1 G/DL (ref 12–16)
HYPOCHROMIA BLD QL SMEAR: ABNORMAL
IMM GRANULOCYTES # BLD AUTO: 0 K/UL (ref 0–0.04)
IMM GRANULOCYTES NFR BLD AUTO: 0 % (ref 0–0.5)
INR PPP: 1.3 (ref 0.8–1.2)
LYMPHOCYTES # BLD AUTO: 0.3 K/UL (ref 1–4.8)
LYMPHOCYTES NFR BLD: 21.4 % (ref 18–48)
MCH RBC QN AUTO: 29 PG (ref 27–31)
MCHC RBC AUTO-ENTMCNC: 30.1 G/DL (ref 32–36)
MCV RBC AUTO: 96 FL (ref 82–98)
MONOCYTES # BLD AUTO: 0.1 K/UL (ref 0.3–1)
MONOCYTES NFR BLD: 10.3 % (ref 4–15)
NEUTROPHILS # BLD AUTO: 0.8 K/UL (ref 1.8–7.7)
NEUTROPHILS NFR BLD: 60.4 % (ref 38–73)
NRBC BLD-RTO: 0 /100 WBC
OVALOCYTES BLD QL SMEAR: ABNORMAL
PLATELET # BLD AUTO: 52 K/UL (ref 150–450)
PLATELET BLD QL SMEAR: ABNORMAL
PMV BLD AUTO: 11.7 FL (ref 9.2–12.9)
POIKILOCYTOSIS BLD QL SMEAR: SLIGHT
POLYCHROMASIA BLD QL SMEAR: ABNORMAL
POTASSIUM SERPL-SCNC: 4 MMOL/L (ref 3.5–5.1)
PROT SERPL-MCNC: 5.3 G/DL (ref 6–8.4)
PROTHROMBIN TIME: 13.3 SEC (ref 9–12.5)
RBC # BLD AUTO: 2.79 M/UL (ref 4–5.4)
SODIUM SERPL-SCNC: 142 MMOL/L (ref 136–145)
SPHEROCYTES BLD QL SMEAR: ABNORMAL
WBC # BLD AUTO: 1.26 K/UL (ref 3.9–12.7)

## 2022-03-15 PROCEDURE — 99024 PR POST-OP FOLLOW-UP VISIT: ICD-10-PCS | Mod: ,,, | Performed by: PHYSICIAN ASSISTANT

## 2022-03-15 PROCEDURE — C1751 CATH, INF, PER/CENT/MIDLINE: HCPCS

## 2022-03-15 PROCEDURE — 25000003 PHARM REV CODE 250

## 2022-03-15 PROCEDURE — 11000001 HC ACUTE MED/SURG PRIVATE ROOM

## 2022-03-15 PROCEDURE — A4216 STERILE WATER/SALINE, 10 ML: HCPCS | Performed by: NEUROLOGICAL SURGERY

## 2022-03-15 PROCEDURE — 97161 PT EVAL LOW COMPLEX 20 MIN: CPT

## 2022-03-15 PROCEDURE — A4216 STERILE WATER/SALINE, 10 ML: HCPCS | Performed by: PHYSICIAN ASSISTANT

## 2022-03-15 PROCEDURE — 63600175 PHARM REV CODE 636 W HCPCS: Performed by: PHYSICIAN ASSISTANT

## 2022-03-15 PROCEDURE — 85610 PROTHROMBIN TIME: CPT

## 2022-03-15 PROCEDURE — 85025 COMPLETE CBC W/AUTO DIFF WBC: CPT | Performed by: STUDENT IN AN ORGANIZED HEALTH CARE EDUCATION/TRAINING PROGRAM

## 2022-03-15 PROCEDURE — 99024 POSTOP FOLLOW-UP VISIT: CPT | Mod: ,,, | Performed by: PHYSICIAN ASSISTANT

## 2022-03-15 PROCEDURE — 25000003 PHARM REV CODE 250: Performed by: PHYSICIAN ASSISTANT

## 2022-03-15 PROCEDURE — 25000003 PHARM REV CODE 250: Performed by: STUDENT IN AN ORGANIZED HEALTH CARE EDUCATION/TRAINING PROGRAM

## 2022-03-15 PROCEDURE — 80053 COMPREHEN METABOLIC PANEL: CPT

## 2022-03-15 PROCEDURE — 97166 OT EVAL MOD COMPLEX 45 MIN: CPT

## 2022-03-15 PROCEDURE — 25000003 PHARM REV CODE 250: Performed by: NEUROLOGICAL SURGERY

## 2022-03-15 PROCEDURE — 92523 SPEECH SOUND LANG COMPREHEN: CPT | Performed by: SPEECH-LANGUAGE PATHOLOGIST

## 2022-03-15 RX ORDER — AMOXICILLIN 250 MG
1 CAPSULE ORAL 2 TIMES DAILY
Status: DISCONTINUED | OUTPATIENT
Start: 2022-03-15 | End: 2022-03-22 | Stop reason: HOSPADM

## 2022-03-15 RX ORDER — CALCIUM CARBONATE 200(500)MG
500 TABLET,CHEWABLE ORAL 2 TIMES DAILY PRN
Status: CANCELLED | OUTPATIENT
Start: 2022-03-15

## 2022-03-15 RX ORDER — SODIUM CHLORIDE 0.9 % (FLUSH) 0.9 %
10 SYRINGE (ML) INJECTION EVERY 6 HOURS
Status: DISCONTINUED | OUTPATIENT
Start: 2022-03-15 | End: 2022-03-16

## 2022-03-15 RX ORDER — PANTOPRAZOLE SODIUM 20 MG/1
40 TABLET, DELAYED RELEASE ORAL DAILY
Status: CANCELLED | OUTPATIENT
Start: 2022-03-16

## 2022-03-15 RX ORDER — LIDOCAINE 50 MG/G
1 PATCH TOPICAL
Status: DISCONTINUED | OUTPATIENT
Start: 2022-03-15 | End: 2022-03-17

## 2022-03-15 RX ORDER — MUPIROCIN 20 MG/G
OINTMENT TOPICAL 2 TIMES DAILY
Status: DISPENSED | OUTPATIENT
Start: 2022-03-15 | End: 2022-03-20

## 2022-03-15 RX ORDER — SODIUM CHLORIDE 9 MG/ML
INJECTION, SOLUTION INTRAVENOUS CONTINUOUS
Status: CANCELLED | OUTPATIENT
Start: 2022-03-15

## 2022-03-15 RX ORDER — LIDOCAINE 50 MG/G
1 PATCH TOPICAL
Status: CANCELLED | OUTPATIENT
Start: 2022-03-15

## 2022-03-15 RX ORDER — SODIUM CHLORIDE 0.9 % (FLUSH) 0.9 %
10 SYRINGE (ML) INJECTION EVERY 6 HOURS
Status: CANCELLED | OUTPATIENT
Start: 2022-03-15

## 2022-03-15 RX ORDER — OXYCODONE HYDROCHLORIDE 5 MG/1
10 TABLET ORAL EVERY 4 HOURS PRN
Status: DISCONTINUED | OUTPATIENT
Start: 2022-03-15 | End: 2022-03-22 | Stop reason: HOSPADM

## 2022-03-15 RX ORDER — TALC
6 POWDER (GRAM) TOPICAL NIGHTLY PRN
Status: CANCELLED | OUTPATIENT
Start: 2022-03-15

## 2022-03-15 RX ORDER — PREGABALIN 75 MG/1
75 CAPSULE ORAL 3 TIMES DAILY
Status: DISCONTINUED | OUTPATIENT
Start: 2022-03-15 | End: 2022-03-22 | Stop reason: HOSPADM

## 2022-03-15 RX ORDER — MICONAZOLE NITRATE 2 %
POWDER (GRAM) TOPICAL 2 TIMES DAILY
Status: DISCONTINUED | OUTPATIENT
Start: 2022-03-15 | End: 2022-03-22 | Stop reason: HOSPADM

## 2022-03-15 RX ORDER — MICONAZOLE NITRATE 2 %
POWDER (GRAM) TOPICAL 2 TIMES DAILY
Status: CANCELLED | OUTPATIENT
Start: 2022-03-15

## 2022-03-15 RX ORDER — POLYETHYLENE GLYCOL 3350 17 G/17G
17 POWDER, FOR SOLUTION ORAL DAILY PRN
Status: DISCONTINUED | OUTPATIENT
Start: 2022-03-15 | End: 2022-03-22 | Stop reason: HOSPADM

## 2022-03-15 RX ORDER — ACETAMINOPHEN 325 MG/1
650 TABLET ORAL EVERY 6 HOURS PRN
Status: DISCONTINUED | OUTPATIENT
Start: 2022-03-15 | End: 2022-03-22 | Stop reason: HOSPADM

## 2022-03-15 RX ORDER — LANOLIN ALCOHOL/MO/W.PET/CERES
1 CREAM (GRAM) TOPICAL DAILY
Status: CANCELLED | OUTPATIENT
Start: 2022-03-16

## 2022-03-15 RX ORDER — POLYETHYLENE GLYCOL 3350 17 G/17G
17 POWDER, FOR SOLUTION ORAL DAILY PRN
Status: CANCELLED | OUTPATIENT
Start: 2022-03-15

## 2022-03-15 RX ORDER — OXYCODONE HYDROCHLORIDE 5 MG/1
20 TABLET ORAL EVERY 4 HOURS PRN
Status: DISCONTINUED | OUTPATIENT
Start: 2022-03-15 | End: 2022-03-22 | Stop reason: HOSPADM

## 2022-03-15 RX ORDER — TALC
6 POWDER (GRAM) TOPICAL NIGHTLY PRN
Status: DISCONTINUED | OUTPATIENT
Start: 2022-03-15 | End: 2022-03-22 | Stop reason: HOSPADM

## 2022-03-15 RX ORDER — CALCIUM CARBONATE 200(500)MG
500 TABLET,CHEWABLE ORAL 2 TIMES DAILY PRN
Status: DISCONTINUED | OUTPATIENT
Start: 2022-03-15 | End: 2022-03-22 | Stop reason: HOSPADM

## 2022-03-15 RX ORDER — LANOLIN ALCOHOL/MO/W.PET/CERES
1 CREAM (GRAM) TOPICAL DAILY
Status: DISCONTINUED | OUTPATIENT
Start: 2022-03-16 | End: 2022-03-22 | Stop reason: HOSPADM

## 2022-03-15 RX ORDER — OXYCODONE HYDROCHLORIDE 10 MG/1
10 TABLET ORAL EVERY 4 HOURS PRN
Status: CANCELLED | OUTPATIENT
Start: 2022-03-15

## 2022-03-15 RX ORDER — SODIUM CHLORIDE 0.9 % (FLUSH) 0.9 %
10 SYRINGE (ML) INJECTION
Status: CANCELLED | OUTPATIENT
Start: 2022-03-15

## 2022-03-15 RX ORDER — SERTRALINE HYDROCHLORIDE 50 MG/1
50 TABLET, FILM COATED ORAL DAILY
Status: CANCELLED | OUTPATIENT
Start: 2022-03-16

## 2022-03-15 RX ORDER — SERTRALINE HYDROCHLORIDE 50 MG/1
50 TABLET, FILM COATED ORAL DAILY
Status: DISCONTINUED | OUTPATIENT
Start: 2022-03-16 | End: 2022-03-22 | Stop reason: HOSPADM

## 2022-03-15 RX ORDER — AMOXICILLIN 250 MG
1 CAPSULE ORAL 2 TIMES DAILY
Status: CANCELLED | OUTPATIENT
Start: 2022-03-15

## 2022-03-15 RX ORDER — SODIUM CHLORIDE 0.9 % (FLUSH) 0.9 %
10 SYRINGE (ML) INJECTION
Status: DISCONTINUED | OUTPATIENT
Start: 2022-03-15 | End: 2022-03-16

## 2022-03-15 RX ORDER — ACETAMINOPHEN 325 MG/1
650 TABLET ORAL EVERY 6 HOURS PRN
Status: CANCELLED | OUTPATIENT
Start: 2022-03-15

## 2022-03-15 RX ORDER — OXYCODONE HYDROCHLORIDE 10 MG/1
20 TABLET ORAL EVERY 4 HOURS PRN
Status: CANCELLED | OUTPATIENT
Start: 2022-03-15

## 2022-03-15 RX ORDER — SODIUM CHLORIDE 9 MG/ML
INJECTION, SOLUTION INTRAVENOUS CONTINUOUS
Status: DISCONTINUED | OUTPATIENT
Start: 2022-03-15 | End: 2022-03-15

## 2022-03-15 RX ORDER — PREGABALIN 75 MG/1
75 CAPSULE ORAL 3 TIMES DAILY
Status: CANCELLED | OUTPATIENT
Start: 2022-03-15

## 2022-03-15 RX ORDER — PANTOPRAZOLE SODIUM 40 MG/1
40 TABLET, DELAYED RELEASE ORAL DAILY
Status: DISCONTINUED | OUTPATIENT
Start: 2022-03-16 | End: 2022-03-22 | Stop reason: HOSPADM

## 2022-03-15 RX ADMIN — OXYCODONE HYDROCHLORIDE 20 MG: 5 TABLET ORAL at 04:03

## 2022-03-15 RX ADMIN — CEFTRIAXONE 2 G: 2 INJECTION, POWDER, FOR SOLUTION INTRAMUSCULAR; INTRAVENOUS at 02:03

## 2022-03-15 RX ADMIN — Medication 10 ML: at 07:03

## 2022-03-15 RX ADMIN — OXYCODONE HYDROCHLORIDE 20 MG: 10 TABLET ORAL at 02:03

## 2022-03-15 RX ADMIN — Medication 10 ML: at 06:03

## 2022-03-15 RX ADMIN — PANTOPRAZOLE SODIUM 40 MG: 20 TABLET, DELAYED RELEASE ORAL at 09:03

## 2022-03-15 RX ADMIN — ACETAMINOPHEN 1000 MG: 325 TABLET ORAL at 06:03

## 2022-03-15 RX ADMIN — SENNOSIDES AND DOCUSATE SODIUM 1 TABLET: 50; 8.6 TABLET ORAL at 09:03

## 2022-03-15 RX ADMIN — FERROUS SULFATE TAB 325 MG (65 MG ELEMENTAL FE) 1 EACH: 325 (65 FE) TAB at 09:03

## 2022-03-15 RX ADMIN — PREGABALIN 75 MG: 75 CAPSULE ORAL at 04:03

## 2022-03-15 RX ADMIN — PREGABALIN 75 MG: 75 CAPSULE ORAL at 08:03

## 2022-03-15 RX ADMIN — CEFTRIAXONE 2 G: 2 INJECTION, SOLUTION INTRAVENOUS at 04:03

## 2022-03-15 RX ADMIN — OXYCODONE HYDROCHLORIDE 20 MG: 10 TABLET ORAL at 06:03

## 2022-03-15 RX ADMIN — ACETAMINOPHEN 1000 MG: 325 TABLET ORAL at 01:03

## 2022-03-15 RX ADMIN — Medication 6 MG: at 08:03

## 2022-03-15 RX ADMIN — PREGABALIN 75 MG: 75 CAPSULE ORAL at 09:03

## 2022-03-15 RX ADMIN — SERTRALINE HYDROCHLORIDE 50 MG: 50 TABLET ORAL at 09:03

## 2022-03-15 RX ADMIN — OXYCODONE HYDROCHLORIDE 20 MG: 5 TABLET ORAL at 08:03

## 2022-03-15 RX ADMIN — Medication 10 ML: at 01:03

## 2022-03-15 RX ADMIN — MICONAZOLE NITRATE 2 % TOPICAL POWDER: at 09:03

## 2022-03-15 RX ADMIN — Medication 10 ML: at 09:03

## 2022-03-15 NOTE — PLAN OF CARE
[facility] (Hospital)  Discharge Final Note    Primary Care Provider: Dannielle Merino DO    Expected Discharge Date: 3/15/2022     Patient to be discharged to [facility].  Care deferred to [facility].  [facility] to provide wheelchair transportation.  Neurosurgery clinic to schedule follow up appointment.      Nurse to call report to [phone].  Wheelchair van requested for 10:00 which is not a guaranteed arrival time.    Future Appointments   Date Time Provider Department Center   3/23/2022  2:00 PM Rao Bellamy MD [facility]ON[facility] [place]   3/25/2022  9:00 AM Margareth Doe DO [facility] ID [facility]   4/13/2022 11:30 AM Margareth Doe DO [facility]   4/14/2022 12:45 PM NOM [facility]XRAY [facility]   4/14/2022  2:00 PM Janet Morales NP [facility] NEUROS8 [facility]       Final Discharge Note (most recent)     Final Note - 03/15/22 0937        Final Note    Assessment Type Final Discharge Note     Anticipated Discharge Disposition Rehab Facility        Post-Acute Status    Post-Acute Authorization Placement     Post-Acute Placement Status Set-up Complete/Auth obtained     Discharge Delays None known at this time                 Important Message from Medicare             Contact Info     Dannielle Merino DO   Specialty: Family Medicine   Relationship: PCP - General    [facility]   [address]  [facility] [address]   Phone: [phone]       Next Steps: Follow up

## 2022-03-15 NOTE — PLAN OF CARE
No additional recommendations. Hospital medicine consult team will sign off, please secure-chat or re-consult us if additional concerns arise.     Thank you for involving us in her care.      Britt Spivey MD  PGY-2  Hospital Medicine Consult Service

## 2022-03-15 NOTE — NURSING
Call place to [phone] to give report on patient. I was told nurse is busy with another patient and will call me back. Ext and name given for call back. I also left message to nurse that patient is currently leaving floor and will be on the way there.

## 2022-03-15 NOTE — NURSING
Patient states she does want her name on census list.  She would like to opt out because of relative that works at facility.  Will contact registration to opt patient out of census.

## 2022-03-15 NOTE — PLAN OF CARE
[facility] - Rehab (Hospital)  Initial Discharge Assessment       Primary Care Provider: Dannielle Merino DO    Admission Diagnosis: Spondylodiscitis [M46.40]    Admission Date: 3/15/2022  Expected Discharge Date:     Discharge Barriers Identified: None    Payor: MEDICAID / Plan: [employer] [place] / Product Type: Managed Medicaid /     Extended Emergency Contact Information  Primary Emergency Contact: Minor Reynoso  Address: [address] [place] [address]  Home Phone: [phone]  Mobile Phone: [phone]  Relation: Significant other  Secondary Emergency Contact: Meggan Llamas   [place]  Mobile Phone: [phone]  Relation: Friend    Discharge Plan A: Home  Discharge Plan B: Home      [facility] [address] - [place], [place] - [address]  [address]  [facility] [address]  Phone: [phone] Fax: [phone]      Initial Assessment (most recent)     Adult Discharge Assessment - 03/15/22 1446        Discharge Assessment    Assessment Type Discharge Planning Assessment     Confirmed/corrected address, phone number and insurance Yes     Confirmed Demographics Correct on Facesheet     Source of Information patient     When was your last doctors appointment? --   No value- sometime in February for a wellness visit    Communicated SHIRA with patient/caregiver Date not available/Unable to determine     Lives With significant other     Facility Arrived From: [facility]     Do you expect to return to your current living situation? Yes     Do you have help at home or someone to help you manage your care at home? No   Pt does not have a caregiver but has assistance from Minor and friends if needed.    Who are your caregiver(s) and their phone number(s)? Minor Reynoso [phone]     Prior to hospitilization cognitive status: Alert/Oriented     Current cognitive status: Alert/Oriented     Walking or Climbing Stairs Difficulty  ambulation difficulty, requires equipment     Mobility Management Rolling Walker for home use and quad cane when ambulating outside of home     Dressing/Bathing Difficulty bathing difficulty, assistance 1 person     Dressing/Bathing Management Minor assist with lower body dressing and bathing     Do you have any problems with: Errands/Grocery   Minor runs all errands    Home Accessibility not wheelchair accessible     Home Layout Able to live on 1st floor     Equipment Currently Used at Home walker, rolling;rollator;cane, quad;bedside commode;grab bar;raised toilet     Readmission within 30 days? No     Patient currently being followed by outpatient case management? No     Do you currently have service(s) that help you manage your care at home? No     Do you take prescription medications? Yes     Do you have any problems affording any of your prescribed medications? No     Is the patient taking medications as prescribed? yes     Who is going to help you get home at discharge? Minor Reynoso     How do you get to doctors appointments? family or friend will provide     Are you on dialysis? No     Do you take coumadin? No     Discharge Plan A Home     Discharge Plan B Home     DME Needed Upon Discharge  none     Discharge Plan discussed with: Patient     Discharge Barriers Identified None        Relationship/Environment    Name(s) of Who Lives With Patient Minor Reynoso                  discharge assessment complete with pt. Pt. Awake, alert and oriented during the assessment. Per patient, she lives in a mobile home with 5 steps to enter. She utilizes a rollator when ambulating in home and a quad cane when ambulating outside of the home. Her significant other Minor assist her with lower body dressing and she is able to bathe herself, but does require some assistance from Minor.   Minor will be assisting patient at discharge and will be taking her to follow up appointments.       Pt noted that she is noncompliant with follow up  appointments and also noted that at her last discharge from [facility], she did not finish her course of antibiotics d/t feeling better and accidentally pulling out her PICC line. CM discussed the importance of finishing antibiotic course as pt will discharge home on IV abx again. Also discussed with patient the importance of complying with the treatment plan and attending follow up appointments.  Pt verbally understood and said she will ensure she goes to all follow up appointments and take all medications as prescribed. CM discussed outpatient therapy with patient and she is in agreement with this should this be something she needs once evaluated by [facility] therapist. Her preference is [facility].    CM will continue to follow and offer assistance as needed.

## 2022-03-15 NOTE — PT/OT/SLP EVAL
..Speech Language Pathology   Evaluation Combo     Patient Name:  Rachel sherwood  MRN: [id number]   Admitting Diagnosis:  Spondylodiscitis [M46.40]  Diet recommendations:   Solid Diet Level: Regular    Liquid Diet Level: Thin     Plan:   No ST services warranted at this time; Pt is WFL for communication & swallow.      SLP Treatment Date: 3/15/2022  Speech Start Time: 1755   Speech Stop Time: 1855   Speech Total (min): 60 min        TREATMENT BILLABLE MINUTES:  Eval 60 minutes        Diagnosis Date Noted POA    PRINCIPAL PROBLEM:  Spondylodiscitis [M46.40] 04/14/2021 Yes    Vaginal itching [N89.8] 03/05/2022 No    Discitis of lumbar region [M46.46]   Yes    Coagulopathy [D68.9] 02/15/2022 Yes    Hyperbilirubinemia [E80.6] 02/15/2022 Yes    Mild episode of recurrent major depressive disorder [F33.0] 02/09/2022 Yes       Chronic    Amphetamine use disorder, severe [F15.20] 04/15/2021 Yes    Cannabis use disorder, moderate, dependence [F12.20] 04/15/2021 Yes    Pre-op evaluation [Z01.818] 04/14/2021 Not Applicable    Thrombocytopenia [D69.6] 04/14/2021 Yes    Tobacco use disorder [F17.200] 04/14/2021 Yes    Chronic hepatitis C with cirrhosis [B18.2, K74.60] 01/08/2017 Yes    Cirrhosis of liver without ascites [K74.60] 12/06/2016 Yes    Pancytopenia [D61.818] 03/04/2015 Yes       General Precautions:standard; fall     Social Hx: Patient lives with her boyfriend; Aunt will help during the day post d/c     Prior diet: Regular/ thin liquids     Subjective:  Patient was awake, alert, and able to carry on appropriate conversation w/ good medical hx.      Patient goals:   to return to her PLOF.     Pain/Comfort    Pain Rating 1: 0/10     Objective:   Oral Musculature Evaluation  Oral Musculature: WFL for speech & swallow  Dentition: WFL    Cognitive Status:  Behavioral Observations: alert ;cooperative   Memory and Orientation:   Orientation  Orientation Yes No  Inconsistent Comments   Person x         Place x          Situation x         City x         State x         Time x         Month x         Date x         Day of Week x         Year x          x         Age x         Family Members x         Biographical Information x            She was able to immediately recall 3/3 unrelated items,  and 3/3 after a 3 min. delay. She was able to recall her last meal and able to recall other recent information and events from her prior hospitalization.     Attention: WFL  Problem Solving/basic abstract reasoning: [facility]   Executive Function:  [facility]     Auditory Comprehension: WFL ; Pt able to identify objects, answer yes/no questions and able to follow 3-step verbal commands     Verbal Expression:[facility]      Motor Speech:    [facility]    Voice: [facility]     Bedside Swallow Eval:  Consistencies Assessed: Pt marlena'd thin liquids & solids on her supper tray w/ no overt s/s aspiration. No hx dysphagia.      Oral Phase:WFL   Pharyngeal Phase:No overt clinical signs/symptoms of pharyngeal dysphagia     Additional Treatment:       Assessment:   Ms Zazueta is a 41 y.o. female with a medical diagnosis of Spondylodiscitis [M46.40] and presents with  WFL cognition, communication & swallow skills.       Plan:   No ST services warranted at this time; Pt is WF for communication & swallow.     Plan of Care reviewed with: patient  SLP Follow-up?: No          Monique Salas CCC-SLP  3/15/2022

## 2022-03-15 NOTE — PT/OT/SLP EVAL
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9    CHOLECYSTECTOMY      KIDNEY SURGERY      LUMBAR FUSION Bilateral 3/2/2022    Procedure: FUSION, SPINE, LUMBAR;  Surgeon: Guille Templeton MD;  Location: [facility] OR [address] [place] [address];  Service: Neurosurgery;  Laterality: Bilateral;  AIRO  T10--Pelvis    REMOVAL OF HARDWARE FROM SPINE N/A 2/21/2022    Procedure: REMOVAL, HARDWARE, SPINE;  Surgeon: Guille Templeton MD;  Location: [facility] OR [address] [place] [address];  Service: Neurosurgery;  Laterality: N/A;  [place]       Referring physician: Dr Guille Templeton  Date referred to PT: 02-15-22    General Precautions: Standard,  (back brace on when out of bed)  Orthopedic Precautions:     Braces:            Patient History:  Lives With: other (see comments) (boyfriend who works during the day)  Living Arrangements: mobile home  Home Accessibility: stairs to enter home (5 steps with elizabeth rails that are far apart)  Home Layout: Able to live on 1st floor  Stair Railings at Home: outside, present at both sides  Equipment Currently Used at Home: bedside commode, grab bar, raised toilet, walker, rolling, rollator    DME owned (not currently used):     Previous Level of Function:  Ambulation Skills: needs device  Transfer Skills: needs device  ADL Skills: needs device  Work/Leisure Activity: needs device    Subjective:  Communicated with  Nurse prior to session.    Chief Complaint: back pain  Patient goals: to get as ind as possible  Family goals same    Pain/Comfort  Pain Rating 1: 7/10  Location - Side  1: Bilateral (low back)    Objective:  Patient found in bed with      Cognitive Exam:  Oriented to: Person, Place, Time and Situation  Follows Commands/attention: Follows multistep  commands  Communication: clear/fluent  Safety awareness/insight to disability: intact    Physical Exam:  Postural examination/scapula alignment:    -       No postural abnormalities identified    Skin integrity: surgery wound low back  Edema: None noted     Sensation:      -       Intact    Upper Extremity Range of Motion:  Refer to OT evaluation for UE ROM.    Upper Extremity Strength:  Refer to OT evaluation for UE strength.    Lower Extremity Range of Motion:  Right Lower Extremity: WFL  Left Lower Extremity: WFL    Lower Extremity Strength:  Right Lower Extremity: hip 3+/5, knee 4/5 ankle 4/5  Left Lower Extremity: 3/5 hip , knee 3+/5, ankle 4/5     Fine motor coordination:     -       Intact    Gross motor coordination: WFL    Functional Mobility:    Bed Mobility :   Supine to sit: Minimal Assistance   Sit to supine: Standby Assistance   Rolling: Standby Assistance   Scooting: Standby Assistance    Transfers:  Sit to stand:Minimal Assistance with Rolling Walker    Bed <> Chair:  Step Transfer with Minimal Assistance with Rolling Walker      Wheelchair:  Pt propelled wc 150ft with sba    Gait:  Pt ambulated 100ft with a rw with sba with back brace on    Stairs:  Pt went up and down 4 steps with rail with shannon     Balance:   Static Sit: GOOD: Takes MODERATE challenges from all directions  Dynamic Sit:  GOOD: Maintains balance through MODERATE excursions of active trunk movement  Static Stand: FAIR: Maintains without assist but unable to take challenges  Dynamic stand: FAIR+: Needs CLOSE SUPERVISION during gait and is able to right self with minor LOB    Endurance deficit:  fair    Special Tests:      Additional Treatment:      Patient left up in chair with call button in reach.    Assessment:  Rachel Wolffblank is a 41 y.o. female  with a medical diagnosis of spondylodiscitis She presents with weakness in BLE worse on the left leg. She has minimal fxn'l deficits and should progress nicely    Rehab potential is good.    Activity tolerance: Good    Plan: pt will be seen 5-7x/week for 2weeks    Discharge recommendations: home health PT     Equipment recommendations: shower chair    GOALS:   STGs  1. Pt will be sba with bed mobiity  2. Pt will be sba with transfers  3. Pt will amb with rw 150ft sba  4 ind with wc 150ft sba  5 up and down 12 steps with rail with sba  LTGs   1. Pt will be ind with rw 150ft   2..pt will be ind with bed mobility  3. Pt will be ind with transfers  4. Pt will be ind with up and down 12 steps with rail     PLAN:    Patient to be seen 5 x/week to address the above listed problems via gait training, therapeutic activities, therapeutic exercises, therapeutic groups, neuromuscular re-education, wheelchair management/training  Plan of Care expires: 03/29/22  Plan of Care reviewed with: patient          3/15/2022

## 2022-03-15 NOTE — PT/OT/SLP EVAL
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 Guille Templeton  Date referred to OT: 03/15/22    General Precautions: Standard, fall  Orthopedic Precautions: spinal precautions  Braces: TLSO    Spiritual, Cultural Beliefs, Worship Practices, Values that Affect Care: no     Patient History:  Living Environment  Lives With: significant other (Pt lives with her boyfriend.)  Living Arrangements: mobile home  Home Accessibility: stairs to enter home (5 steps with wide bilateral handrails)  Stair Railings at Home: outside, present at both sides  Transportation Anticipated: family or friend will provide  Equipment Currently Used at Home: bedside commode, cane, quad, cane, straight, grab bar, dressing device, rollator, walker, rolling    Prior level of function:    Bed Mobility/Transfers: independent  Grooming: independent  Bathing: independent  Upper Body Dressing: independent  Lower Body Dressing: independent  Toileting:  independent  Homemaking Responsibilities: Yes  Meal Prep Responsibility: Secondary  Laundry Responsibility: Secondary  Cleaning Responsibility: Secondary  Bill Paying/Finance Responsibility: Secondary  Shopping Responsibility: Secondary   Responsibility: No  Driving License: No  Mode of Transportation: Family, Friends  Education: Technical College  Leisure and Hobbies: Fishing     Dominant hand: right    Subjective:  Communicated with nurse prior to session.    Chief Complaint: pain and weakness  Patient/Family stated goals: Pt would like to regain strength and independence with ADL's with reduced pain during daily routine in order to improve overall quality of life.     Pain/Comfort  Pain Rating 1: 6/10  Location - Side 1: Bilateral  Location - Orientation 1: lower  Location 1: back  Pain Addressed 1: Reposition, Cessation of Activity, Nurse notified  Pain Rating Post-Intervention 1: 6/10    Objective:       Cognitive Exam:  Oriented to: Person, Place, Time and Situation  Follows Commands/attention: Follows multistep  commands  Communication: clear/fluent  Memory:  No Deficits noted  Safety awareness/insight to disability: impaired  Coping skills/emotional control: Appropriate to situation    Visual/perceptual:  Impaired  tracking, acuity and visual field    Physical Exam:  Postural examination/scapula alignment: -       No postural abnormalities identified, but TLSO donned  Skin integrity: Wound surgical incision   Edema: Moderate bilateral lower legs    Sensation:   -       Intact  light/touch bilateral UE's and proprioception bilateral UE's    Upper Extremity Range of Motion:  Right Upper Extremity: WFL  Left Upper Extremity: WFL    Upper Extremity Strength:  Right Upper Extremity: Deficits: 3+ to 4- / 5  Left Upper Extremity: Deficits: 3+ to 4- / 5   Strength: (R) 61 lbs; (L) 53 lbs    Fine motor coordination:   -       Impaired  Left hand, finger to nose mild, Right hand, finger to nose mild,  Left hand, manipulation of objects mild and Right hand, manipulation of objects mild    Gross motor coordination: Impaired bilateral UE hand-eye coordination    Functional Mobility:  Bed Mobility:   Supine to sit: Minimal Assistance   Sit to supine: Standby Assistance   Rolling: Standby Assistance   Scooting: Standby Assistance    Transfers:   Sit to stand:Minimal Assistance with Rolling Walker .  Bed <> Chair:  Step Transfer with Minimal Assistance with Rolling Walker .  Toilet Transfer:  Pt Step Transfer with Moderate Assistance with Grab bars .    Feeding:  Patient performed feeding with Setup Assistance with extra time  .    Grooming:  Patient peformed hand washing with Setup Assistance at sitting at sink.  Patient performed face washing with Setup Assistance at sitting at sink.  Patient performed oral hygeine with Setup Assistance at sitting at sink.  Patient performe hair grooming with Setup Assistance at sitting at sink.    Bathing:  Patient performed bathing with Moderate Assistance with grab bar, Handheld shower head and shower chair at Shower.    UE Dressing:  Patient performed UE Dressing with Minimal Assistance with extra time at Edge of bed.    LE Dressing:  Patient don/doffed socks with Total Assistance, Patient performed don/doffed adult brief with Maximum Assistance and Patient performed don/doffed pants with Maximum Assistance    Toileting:  Pt performed toileting with Maximum Assistance with Grab  bar at Commode.    Balance:   Static Sit: GOOD: Takes MODERATE challenges from all directions  Dynamic Sit:  GOOD-: Incosistently Maintains balance through MODERATE excursions of active trunk movement,     Static Stand: FAIR: Maintains without assist but unable to take challenges  Dynamic stand: FAIR: Needs CONTACT GUARD during gait      Patient left HOB elevated with call button in reach and nurse notified    Assessment:  Rachel Zazueta is a 41 y.o. female with a medical diagnosis of Spondylodiscitis  and presents with weakness, impaired sensation, impaired self care skills, impaired functional mobilty, decreased coordination, decreased safety awareness, pain, decreased lower extremity function, decreased upper extremity function, gait instability, impaired balance, and decreased ROM.    Rehab potential is good    Activity tolerance: Fair    Discharge recommendations: other (see comments) (To be further determined based on progress prior to discharge.)     Equipment recommendations: other (see comments) (To be further determined based on progress prior to discharge.)     GOALS:   Short Term Goals to be met by: 03/22/22     Patient will increase functional independence with ADLs by performing:    UE Dressing with Supervision.  LE Dressing with Moderate Assistance.  Grooming while standing at sink with Supervision.  Toileting from toilet with Minimal Assistance for hygiene and clothing management.   Bathing from  shower chair/bench with Minimal Assistance.  Step transfer with Stand-by Assistance  Toilet transfer to toilet with Stand-by Assistance.  Increased functional strength to 4/5 for bilateral UE's.    Long Term Goals to be met by: 03/29/22     Patient will increase functional independence with ADLs by performing:    Feeding with [place].  UE Dressing with Modified [place].  LE Dressing with Supervision.  Grooming while standing at sink with Modified [place].  Toileting from toilet with Supervision for hygiene and clothing management.   Bathing from  shower chair/bench with Supervision.  Step transfer with Modified [place]  Toilet transfer to toilet with Modified [place].  Increased functional strength to 4+/5 for bilateral UE's.      PLAN: Patient to be seen 5 x/week (for 90 min per day for 14 days) to address the above listed problems via self-care/home management, community/work re-entry, therapeutic activities, therapeutic exercises, neuromuscular re-education  Plan of Care  expires: 03/29/22  Plan of Care reviewed with: patient         03/15/2022

## 2022-03-15 NOTE — DISCHARGE SUMMARY
[facility] - Neurosurgery ([facility])  Neurosurgery  Discharge Summary      Patient Name: Rachel Zazueta  MRN: [id number]  Admission Date: 2/14/2022  Hospital Length of Stay: 29 days  Discharge Date and Time: 3/15/2022 11:11 AM  Attending Physician: Guille Templeton MD  Discharging Provider: Shabnam Almendarez PA-C  Primary Care Provider: Dannielle Merino DO    HPI:   Rachel Zazueta is a 41 y.o.  female with PMHx of Hepatitis C, liver cirrhosis, pancytopenia, and polysubstance abuse, who presents to clinic for follow up with new imaging s/p L3 and L4 laminectomy for evacuation of epidural abscess and L3-L5 TLIF on 04/16/2021.     The pt c/o worsening back and left leg pain since October. She states that she is no longer using IV drugs. States only using marijuana. Describes the left leg pain as a shock down the leg when standing for 10 minutes. Denies taking any antibiotics. Endorses new weakness in the legs. Denies b/b dysfunction. Denies new neck pain. She ambulates with a walker at home. States that she smoke.    She presents to ED as direct admit from clinic.       Procedure(s) (LRB):  FUSION, SPINE, LUMBAR (Bilateral)     Hospital Course: 2/15: NAEON. IR bone biopsy today of L3-4 disc space. Inflammatory markers elevated. Afebrile. HM consulted for optimization prior to OR and risk stratification. Patient had a lithotripsy done in July and reports back pain after ureteral stents removed, worsened since October and was unable to stand without significant pain. She reports LLE pain that radiates to the thigh and lateral aspect, sometimes to the foot. Denies b/b dysfunction. ID consulted. Plan for OR Monday for washout, removal of hardware and extension of fusion.  2/16: NAEON. AFVSS. Reports increased back pain with standing after the biopsy yesterday. Currently tolerable. Voiding spontaneously. Plan for OR Monday 2/21.  2/17: NAEON. AFVSS. Back pain tolerable today. Reports leg pain with standing or  sitting. XR scoliosis today.   2/18: NAEON. Pt denies new complaints today. XR scoliosis pending. Pending OR Monday 2/21 2/19: NAEON. AFVSS. Exam stable. Pain controlled. Tolerating PO. Voiding and stooling without difficulty. Plan for OR Monday.   2/20: NAEON. AFVSS. Exam stable. Pain controlled. OR tomorrow.   2/21: OR today for hardware removal and washout.  2/23: NAEON. Patient stepped down to floor care. S/p hardware removal and washout. Tachycardia noted. EKG with sinus tach. Patient denies CP, SOB, or lightheadedness. Reports incisional lower back pain. Denies radiating pain. Voiding spontaneously. Pain regimen adjusted.   2/24: NAEON. Afebrile. Hypotensive this am to 98/54. Other vitals wnl. H&H 7.3 & 24.0. HV drains with 290 & 170 cc output. Patient denies sob, lightheadedness, CP, lethargy. Will transfuse 1u prbcs today. Repeat type&screen ordered. She reports continued back pain that radiates down her LLE. Denies new numbness or weakness. Voiding spontaneously and has had a BM.   2/15: Afebrile, vitals stable. Wound vac seal leak overnight, tegaderm applied and proper seal obtained. S/p 1u prbc transfusion yesterday. H&H 7.7 & 24.7 this am. HV drains to gravity with 235 and 20 output. Neuro exam is stable. UA ordered for hazy, dark fawn urine. Denies dysuria.   2/26: SAMRA neurostable, drain output 135/110. Pending OR 3/2  2/27: SAMRA, neurostable, drain output 110/80. Some minor leakage from drain site. Pending OR 3/2.  2/28: [place]. Neuro exam is stable. Reports continued back pain that radiates down her LLE. Denies new numbness or weakness. Continues to void spontaneously and has had Bms. Tolerating PO. H&H 7.6 & 24.2. Plt 78. Protime 13.2, INR 1.3. May need platelet transfusion prior to OR 3/2. WV removed, HV drains in place. Up in chair today with brace in place.   3/1: SAMRA. Neuro stable. H&H 7.4 & 24.1 today. Transfusing pRBCs and platelets prior to surgery tomorrow am. HV drains to remain in  place. Discussed surgical plan, risks, benefits and alternatives with patient. Patient voiced understanding.   3/3 POD1 s/p T10-Pelvis posterior instrumented fusion with L5-S1 TLIF. Tolerated procedure well, two drains to remain in place, plts low, tranfuse 2 packs today. Stable to TTF today  3/4: POD 2, transfused platelets and PRBC yesterday, plt still low. Neuro stable. Drain and WV in place till POD 5  3/5: POD 3. NAEON. AFVSS. Exam stable. Pain improved. Saturating well on room air. Tolerating PO without n/v. Adequate UOP.   3/6: POD 4. NAEON. AFVSS. Exam stable. Pain controlled. Tolerating OOB. Tolerating PO, voiding and having BM  3/7: POD 5. NAEON. Afebrile, VSS. H&H and platelets stable. Neuro exam is stable. Patient sitting up in chair upon entry to room, states her pain is well controlled. Tolerating PO, voiding spontaneously and has had BM. WV removed today.  3/8: NAEON. AFVSS. Platelets 60. Will transfuse today. Pain controlled. Voiding spontaneously. Up in chair.   3/9: NAEON. AFVSS. Platelets 107. H&H uptrending. Pain well controlled. Neuro exam stable. Voiding spontaneously, +BM. Up in chair and working well with PT/OT. HV drains may be removed today pending output. US BLE to r/o DVT given patient has been in hospital for a while.  3/10: NAEON. AFVSS. Platelets 47, will transfuse 1u plts. Hemoglobin 7.2, will transfuse 1u pRBCs. Patient reports feeling significantly more tired today. Denies sob. Neuro exam is stable. Both HV drains removed. US BLE negative. Lasix 40 mg given yesterday for LE edema. Reports she takes Lasix 20 mg at home, unable to find in chart. Will need f/u with PCP for continued management. Pending authorization for rehab.   3/11: NAEON. AFVSS. Platelets 48, hemoglobin 7.7 this am s/p platelet and blood transfusions yesterday. Patient chronically pancytopenic. F/u with hematology arranged and patient started on ferrous sulfate daily. Asymptomatic today. Neuro exam is stable. F/u  appointments with neurosurgery arranged and plan of care discussed with patient. Pending authorization for rehab.   3/12: NAEON. VSS. H/H stable today. Pt is asymptomatic and remains neuro stable. Rehab pending  3/13: NAEON. VSS. H/H stable today. Pt is asymptomatic and remains neuro stable. Rehab pending  3/14: NAEON. Neuro stable. Denies weakness or numbness. Plan for discharge to rehab tomorrow.   3/15: NAEON. AFVSS. Remains pan-cytopenic but stable. Pt reports pain controlled, denies new weakness or numbness. Neuro exam stable. Discharging to SNF today. Discussed postop and discharge instructions, all questions answered, 6 week postop appointment scheduled. Follow up appts with ID and Heme/Onc scheduled. Encouraged to call our office with any questions or concerns.        Neurosurgery Physical Exam  General: Well developed, well nourished, not in acute distress.   Head: Normocephalic, atraumatic.  Neck: Full ROM.   Neurologic: Alert and oriented x 4. Thought content appropriate.  GCS: Motor:6  Verbal:5  Eyes:4   GCS Total: 15  Language: No aphasia.  Speech: No dysarthria.  Cranial nerves: Face symmetric, tongue midline, CN II-XII grossly intact.   Eyes: Pupils equal, round, reactive to light with accomodation, EOMI.   Pulmonary: Normal respirations, no signs of respiratory distress.  Abdomen: Soft, non-distended, non-tender to palpation.  Sensory: Intact to light touch throughout.  Motor Strength: Moves all extremities spontaneously with good tone. No abnormal movements seen.      Strength   Deltoids Triceps Biceps Wrist Extension Wrist Flexion Hand    Upper: R 5/5 5/5 5/5 5/5 5/5 5/5     L 5/5 5/5 5/5 5/5 5/5 5/5       Hip Flexion Knee Extension Knee  Flexion Dorsiflexion Plantar flexion EHL   Lower: R 5/5 5/5 5/5 5/5 5/5 5/5     L 5/5 5/5 5/5 5/5 5/5 5/5      Parker: Absent.     Vascular: Bilateral LE pitting edema.   Skin: Skin is warm, dry and intact.     Posterior lumbar incision c/d/i with  staples. No surrounding erythema or edema. No drainage from incision. No palpable hematoma or underlying fluid collection.      Goals of Care Treatment Preferences:  Code Status: Full Code      Consults:   Consults (From admission, onward)        Status Ordering Provider     Inpatient consult to PICC team (hospitals)  Once        Provider:  (Not yet assigned)    Completed LYUDMILA FAJARDO     Inpatient consult to Infectious Diseases  Once        Provider:  (Not yet assigned)    Completed PERRI CANNON     Inpatient consult to PICC team (hospitals)  Once        Provider:  (Not yet assigned)    Completed ROBIN OH     Inpatient consult to Psychiatry  Once        Provider:  (Not yet assigned)    Completed MUSA ROYAL     Inpatient consult to Midline team  Once        Provider:  (Not yet assigned)    Completed MAG MELENDEZ     Inpatient consult to Interventional Radiology  Once        Provider:  (Not yet assigned)    Completed JEFFERY ADAMES     Inpatient consult to Infectious Diseases  Once        Provider:  (Not yet assigned)    Completed JEFFERY ADAMES     Inpatient consult to Hospitalist  Once        Provider:  (Not yet assigned)    Completed JEFFERY ADAMES          Significant Diagnostic Studies: Labs:   BMP:   Recent Labs   Lab 03/14/22  0514 03/15/22  0625    90    142   K 3.7 4.0    109   CO2 26 27   BUN 14 12   CREATININE 0.6 0.6   CALCIUM 7.9* 8.0*   , CMP   Recent Labs   Lab 03/14/22  0514 03/15/22  0625    142   K 3.7 4.0    109   CO2 26 27    90   BUN 14 12   CREATININE 0.6 0.6   CALCIUM 7.9* 8.0*   PROT 5.0* 5.3*   ALBUMIN 2.0* 2.1*   BILITOT 0.6 0.7   ALKPHOS 122 133   AST 51* 54*   ALT 22 23   ANIONGAP 6* 6*   ESTGFRAFRICA >60.0 >60.0   EGFRNONAA >60.0 >60.0   , CBC   Recent Labs   Lab 03/14/22  0514 03/15/22  0625   WBC 1.04* 1.26*   HGB 8.0* 8.1*   HCT 26.5* 26.9*   PLT 48* 52*   , INR   Lab Results   Component Value Date    INR 1.3 (H)  03/15/2022    INR 1.3 (H) 03/14/2022    INR 1.3 (H) 03/13/2022    and All labs within the past 24 hours have been reviewed  Microbiology:   Blood Culture   Lab Results   Component Value Date    LABBLOO No growth after 5 days. 02/14/2022    LABBLOO No growth after 5 days. 02/14/2022    and Wound Cultures: L3-4/L4-5 osteodiscitis, epidural purulent fluid, L5/S1 disc - no growth  Radiology: MRI BRAIN W WO CONTRAST  MRI SPINE CERVICAL-THORACIC-LUMBAR W W/O CONTRAST (XPD)  IR BIOPSY BONE DEEP  XR LUMBAR SPINE 5 VIEW WITH FLEX AND EXT  US ABDOMEN LIMITED WITH DOPPLER (XPD)  XR SCOLIOSIS COMPLETE  SURG - FL SURGERY FLUORO USAGE  XR LUMBAR SPINE AP AND LATERAL  XR CHEST 1 VIEW S/P PICC LINE BY NURSING  SURG - FL SURGERY FLUORO USAGE  CT INTEROPERATIVE LIMITED  XR SCOLIOSIS COMPLETE  US LOWER EXTREMITY VEINS BILATERAL  XR SCOLIOSIS COMPLETE      Pending Diagnostic Studies:     Procedure Component Value Units Date/Time    CT Interoperative Limited [[id number]] Resulted: 03/02/22 1010    Order Status: Sent Lab Status: In process Updated: 03/02/22 1346    Comprehensive metabolic panel [[id number]] Collected: 03/07/22 0351    Order Status: Sent Lab Status: In process Updated: 03/07/22 0353    Specimen: Blood     Comprehensive metabolic panel [[id number]] Collected: 03/06/22 0425    Order Status: Sent Lab Status: In process Updated: 03/06/22 0428    Specimen: Blood         Final Active Diagnoses:    Diagnosis Date Noted POA    PRINCIPAL PROBLEM:  Spondylodiscitis [M46.40] 04/14/2021 Yes    Vaginal itching [N89.8] 03/05/2022 No    Discitis of lumbar region [M46.46]  Yes    Coagulopathy [D68.9] 02/15/2022 Yes    Hyperbilirubinemia [E80.6] 02/15/2022 Yes    Mild episode of recurrent major depressive disorder [F33.0] 02/09/2022 Yes     Chronic    Amphetamine use disorder, severe [F15.20] 04/15/2021 Yes    Cannabis use disorder, moderate, dependence [F12.20] 04/15/2021 Yes    Pre-op evaluation [Z01.818] 04/14/2021 Not Applicable     Thrombocytopenia [D69.6] 04/14/2021 Yes    Tobacco use disorder [F17.200] 04/14/2021 Yes    Chronic hepatitis C with cirrhosis [B18.2, K74.60] 01/08/2017 Yes    Cirrhosis of liver without ascites [K74.60] 12/06/2016 Yes    Pancytopenia [D61.818] 03/04/2015 Yes      Problems Resolved During this Admission:      Discharged Condition: good     Disposition: Skilled Nursing Facility    Follow Up:   Follow-up Information     DO Juanjo Harley    Specialty: Family Medicine  Contact information:  [facility] [place]   [address] [place] [id number]  [phone]                       Patient Instructions:      X-Ray Scoliosis Complete   Standing Status: Future Standing Exp. Date: 03/11/23     Order Specific Question Answer Comments   May the Radiologist modify the order per protocol to meet the clinical needs of the patient? Yes    Release to patient Immediate      Ambulatory referral/consult to Hepatology   Standing Status: Future   Referral Priority: Routine Referral Type: Consultation   Referral Reason: Specialty Services Required   Requested Specialty: Hepatology   Number of Visits Requested: 1     Ambulatory referral/consult to Hematology / Oncology   Standing Status: Future   Referral Priority: Routine Referral Type: Consultation   Referral Reason: Specialty Services Required   Requested Specialty: Hematology and Oncology   Number of Visits Requested: 1     Medications:  Reconciled Home Medications:      Medication List      START taking these medications    cefTRIAXone 2 g/50 mL Pgbk IVPB  Commonly known as: ROCEPHIN  Inject 50 mLs (2 g total) into the vein every 12 (twelve) hours.     ferrous sulfate 325 (65 FE) MG EC tablet  Take 1 tablet (325 mg total) by mouth once daily.     oxyCODONE 20 mg Tab immediate release tablet  Commonly known as: ROXICODONE  Take 1 tablet (20 mg total) by mouth every 4 (four) hours as needed for Pain.        CHANGE how you take these medications    pregabalin 75 MG  capsule  Commonly known as: LYRICA  Take 1 capsule (75 mg total) by mouth 3 (three) times daily.  What changed:   · medication strength  · how much to take        CONTINUE taking these medications    polyethylene glycol 17 gram/dose powder  Commonly known as: GLYCOLAX  Take 17 g by mouth once daily.     sertraline 25 MG tablet  Commonly known as: ZOLOFT  Take 1 tablet (25 mg total) by mouth once daily.        STOP taking these medications    diclofenac sodium 1 % Gel  Commonly known as: RICHARD Almendarez PA-C  Neurosurgery  [facility] - Neurosurgery [facility])

## 2022-03-15 NOTE — NURSING
Patient arrived to the unit via wheelchair accompanied by .  Patient in NAD at this time.  Sitting on side of the bed. See vitals flow sheet for admission vitals.

## 2022-03-16 PROCEDURE — 97110 THERAPEUTIC EXERCISES: CPT

## 2022-03-16 PROCEDURE — 63600175 PHARM REV CODE 636 W HCPCS: Performed by: PHYSICIAN ASSISTANT

## 2022-03-16 PROCEDURE — 25000003 PHARM REV CODE 250: Performed by: PHYSICIAN ASSISTANT

## 2022-03-16 PROCEDURE — 97530 THERAPEUTIC ACTIVITIES: CPT

## 2022-03-16 PROCEDURE — A4216 STERILE WATER/SALINE, 10 ML: HCPCS | Performed by: INTERNAL MEDICINE

## 2022-03-16 PROCEDURE — 25000003 PHARM REV CODE 250: Performed by: INTERNAL MEDICINE

## 2022-03-16 PROCEDURE — 97116 GAIT TRAINING THERAPY: CPT

## 2022-03-16 PROCEDURE — 97535 SELF CARE MNGMENT TRAINING: CPT

## 2022-03-16 PROCEDURE — 11000001 HC ACUTE MED/SURG PRIVATE ROOM

## 2022-03-16 RX ORDER — SODIUM CHLORIDE 0.9 % (FLUSH) 0.9 %
10 SYRINGE (ML) INJECTION
Status: DISCONTINUED | OUTPATIENT
Start: 2022-03-16 | End: 2022-03-22 | Stop reason: HOSPADM

## 2022-03-16 RX ORDER — SODIUM CHLORIDE 0.9 % (FLUSH) 0.9 %
10 SYRINGE (ML) INJECTION EVERY 12 HOURS
Status: DISCONTINUED | OUTPATIENT
Start: 2022-03-16 | End: 2022-03-22 | Stop reason: HOSPADM

## 2022-03-16 RX ADMIN — PREGABALIN 75 MG: 75 CAPSULE ORAL at 09:03

## 2022-03-16 RX ADMIN — CEFTRIAXONE 2 G: 2 INJECTION, SOLUTION INTRAVENOUS at 02:03

## 2022-03-16 RX ADMIN — PREGABALIN 75 MG: 75 CAPSULE ORAL at 02:03

## 2022-03-16 RX ADMIN — PREGABALIN 75 MG: 75 CAPSULE ORAL at 10:03

## 2022-03-16 RX ADMIN — MUPIROCIN: 20 OINTMENT TOPICAL at 09:03

## 2022-03-16 RX ADMIN — MICONAZOLE NITRATE: 20 POWDER TOPICAL at 10:03

## 2022-03-16 RX ADMIN — FERROUS SULFATE TAB 325 MG (65 MG ELEMENTAL FE) 1 EACH: 325 (65 FE) TAB at 10:03

## 2022-03-16 RX ADMIN — DOCUSATE SODIUM 50MG AND SENNOSIDES 8.6MG 1 TABLET: 8.6; 5 TABLET, FILM COATED ORAL at 10:03

## 2022-03-16 RX ADMIN — OXYCODONE HYDROCHLORIDE 20 MG: 5 TABLET ORAL at 03:03

## 2022-03-16 RX ADMIN — PANTOPRAZOLE SODIUM 40 MG: 40 TABLET, DELAYED RELEASE ORAL at 09:03

## 2022-03-16 RX ADMIN — Medication 10 ML: at 09:03

## 2022-03-16 RX ADMIN — Medication 6 MG: at 10:03

## 2022-03-16 RX ADMIN — OXYCODONE HYDROCHLORIDE 20 MG: 5 TABLET ORAL at 10:03

## 2022-03-16 RX ADMIN — SERTRALINE HYDROCHLORIDE 50 MG: 50 TABLET ORAL at 09:03

## 2022-03-16 RX ADMIN — MUPIROCIN: 20 OINTMENT TOPICAL at 10:03

## 2022-03-16 RX ADMIN — MICONAZOLE NITRATE: 20 POWDER TOPICAL at 09:03

## 2022-03-16 RX ADMIN — OXYCODONE HYDROCHLORIDE 20 MG: 5 TABLET ORAL at 02:03

## 2022-03-16 RX ADMIN — OXYCODONE HYDROCHLORIDE 20 MG: 5 TABLET ORAL at 09:03

## 2022-03-16 NOTE — PT/OT/SLP PROGRESS
Occupational Therapy  Treatment    Rachel Zazueta   MRN: [id number]   Admitting Diagnosis: Spondylodiscitis    OT Date of Treatment: 03/16/22   AM Start Time: na  AM End Time: na  PM Start Time: 1245  PM End Time: 1415  Treatment Type: Individual 90  Total Time (min): 90 min      Billable Minutes:  Self Care/Home Management 30, Therapeutic Activity 30 and Therapeutic Exercise 30  Total Minutes: 90    General Precautions: Standard, fall  Orthopedic Precautions: spinal precautions  Braces: TLSO    Spiritual, Cultural Beliefs, Latter day Practices, Values that Affect Care: no    Subjective:  Communicated with nurse prior to session.    Pain/Comfort  Pain Rating 1: 7/10  Location - Side 1: Bilateral  Location - Orientation 1: lower  Location 1: back  Pain Addressed 1: Pre-medicate for activity, Reposition, Cessation of Activity  Pain Rating Post-Intervention 1: 7/10    Objective:  Pt was cooperative and motivated with minimal verbal encouragement while exhibiting positive affect. She participated in functional transfer retraining throughout session to / from bed, chair, and toilet emphasizing fall prevention providing extra time with repetition requiring min assist secondary to steadying assist with verbal and tactile cueing for safety awareness and technique. Pt then participated in ADL retraining regarding use of adaptive equipment including reacher, sock aide, LH shoehorn, and LH sponge due to spinal precautions providing initial demonstration and instruction followed by repetitive practice with ongoing instruction / cueing. Next, she participated in therapeutic exercise performing 5x5 seated pushups requiring verbal and tactile cueing for technique challenging her to lift buttocks off seated surface to end range elbow extension in order to strengthen triceps brachii to improve performance with sit <> stand transitions particularly from lower surfaces. Pt then participated in therapeutic activity addressing  functional reaching, gross motor coordination, static / dynamic standing balance, standing tolerance, and energy for task / endurance challenging her to reach in multiple planes utilizing bilateral UE's with emphasis on posture in order to retrieve, stabilize, manipulate, and place various light items needed to perform functional tasks of ADL's requiring verbal and tactile cueing to facilitate optimal movement patterns and proper posture while sustaining spinal precautions utilizing forward, lateral, and diagonal steps with steadying assist.     Functional Mobility:  Bed Mobility:   Supine to sit: Standby Assistance   Sit to supine: Standby Assistance   Rolling: Standby Assistance   Scooting: Standby Assistance    Transfer Training:   Sit to stand:Minimal Assistance with Rolling Walker .  Bed <> Chair:  Step Transfer with Minimal Assistance with Rolling Walker .  Toilet Transfer:  Pt Step Transfer with Minimal Assistance with Rolling Walker and Grab bars .      Balance:   Static Sit: GOOD: Takes MODERATE challenges from all directions  Dynamic Sit:  GOOD-: Incosistently Maintains balance through MODERATE excursions of active trunk movement,     Static Stand: FAIR: Maintains without assist but unable to take challenges  Dynamic stand: FAIR: Needs CONTACT GUARD during gait      Patient left supine with call button in reach and nurse notified    ASSESSMENT:  Pt demonstrated improved functional performance with toilet transfers as noted by min assist in which she did not require lifting assist from lower surface on this date, but mostly steadying assist utilizing RW with cueing.      Rehab potential is good    Activity tolerance: Fair    Discharge recommendations: other (see comments) (To be further determined based on progress prior to discharge.)     Equipment recommendations: other (see comments) (To be further determined based on progress prior to discharge.)     GOALS:   Multidisciplinary Problems     Occupational  Therapy Goals        Problem: Occupational Therapy Goal    Goal Priority Disciplines Outcome Interventions   Occupational Therapy Goal     OT, PT/OT     Description: Long Term Goals to be met by: 03/29/22     Patient will increase functional independence with ADLs by performing:    Feeding with [place].  UE Dressing with Modified [place].  LE Dressing with Supervision.  Grooming while standing at sink with Modified [place].  Toileting from toilet with Supervision for hygiene and clothing management.   Bathing from  shower chair/bench with Supervision.  Step transfer with Modified [place]  Toilet transfer to toilet with Modified [place].  Increased functional strength to 4+/5 for bilateral UE's.                     Plan:  Patient to be seen 5 x/week (for 90 min per day for 14 days) to address the above listed problems via self-care/home management, community/work re-entry, therapeutic activities, therapeutic exercises, neuromuscular re-education  Plan of Care expires: 03/29/22  Plan of Care reviewed with: patient         03/16/2022

## 2022-03-16 NOTE — HPI
Patient is a 41-year-old female with a past medical history of hepatitis C cirrhosis of liver anemia and substance abuse.  Patient presented to the clinic in early February for follow-up of a L3 and L4 laminectomy with evacuation of epidural abscess and L3-L5 TLIF and April of 2021.  The patient had worsening weakness and pain and her neurosurgeons diagnosed her with hardware failure and new degeneration of the lower spine.  Patient reportedly had quit IV drug use which had contributed to her original back surgery and epidural abscess but did admit to continuing to use nicotine marijuana and methamphetamine at times.  Patient has severe back pain was unable to ambulate she was having weakness in both legs and was admitted to the acute care facility at the time of the office visit.  Patient's diagnostic testing showed likely infected hardware with failure and she was planned to have a cleanout with fusion.  She was also found to have a kidney stone around the same time had lithotripsy done as well as several ureteral stents that the patient states had become infected and caused complications.  After her lumbar surgery the patient was started on empiric vancomycin and ceftriaxone and was seen by infectious disease.  Psychiatry saw the patient and made several recommendations with the primary being avoid illicit drugs.  Patient's original postop course was slow because of pain psychiatric complications and ultimately she required blood transfusions and several revisions.  Patient has had difficulty with thrombocytopenia recurrent anemia all suspected related to her chronic liver disease and hepatitis C. The patient was placed on antibiotic regimen for the next 30 days and we will review her chart at the outside facility for permissive cultures.  I have evaluated the patient this morning while doing therapy she seems very motivated and eager to improve.  She has a brace on her lower back and is only complaining of  intermittent pain but feels she is getting stronger very rapidly.  The patient did well on the rehab unit sometime last year after a lumbar surgery.  We have encouraged her to avoid illicit substances and drugs and have motivated her.

## 2022-03-16 NOTE — H&P
[facility] Medicine  History & Physical    Patient Name: Rachel Zazueta  MRN: [id number]  Patient Class: IP- Rehab  Admission Date: 3/15/2022  Attending Physician: Sai Red III, MD  Primary Care Provider: Dannielle Merino DO         Patient information was obtained from patient, past medical records and ER records.     Subjective:     Principal Problem:Spondylodiscitis    Chief Complaint:   Chief Complaint   Patient presents with    had surgery        HPI: POST ADMISSION PHYSICIAN ASSESSMENT AND   REHAB HISTORICAL/PHYSICAL        CHIEF COMPLAINT: weakness/had surgery    PRIMARY REHAB IMPAIRMENT GROUP: spinal cord dysfunction - non traumatic    ETIOLOGIC DIAGNOSIS: Spondylodiscitis    SECONDARY AND RELATED DIAGNOSES:  Anemia, constipation, depression, hepatitis C, chronic pain, pancytopenia, scoliosis, thrombocytopenia, tobacco abuse, weakness, tachycardia    CO-MORBIDITIES PRESENT ON ADMISSION:  Cirrhosis of liver, polysubstance abuse, numbness, diskitis, osteomyelitis, left lower extremity pain neuropathic, hyperbilirubinemia.    Risk:  Patient is at high risk for progressive decline in function weakness muscle atrophy joint stiffness and contractures.  Patient at risk for recurrent falls that could lead to more serious injury.  Patient is at risk for infection at surgical site palpitations fatigue lethargy falls hypoxia shortness of breath worsening edema me a weight gain pneumonia DVT PE prolonged nursing home stay and death.    Patient is a 41-year-old female with a past medical history of hepatitis C cirrhosis of liver anemia and substance abuse.  Patient presented to the clinic in early February for follow-up of a L3 and L4 laminectomy with evacuation of epidural abscess and L3-L5 TLIF and April of 2021.  The patient had worsening weakness and pain and her neurosurgeons diagnosed her with hardware failure and new degeneration of the lower spine.  Patient reportedly had  quit IV drug use which had contributed to her original back surgery and epidural abscess but did admit to continuing to use nicotine marijuana and methamphetamine at times.  Patient has severe back pain was unable to ambulate she was having weakness in both legs and was admitted to the acute care facility at the time of the office visit.  Patient's diagnostic testing showed likely infected hardware with failure and she was planned to have a cleanout with fusion.  She was also found to have a kidney stone around the same time had lithotripsy done as well as several ureteral stents that the patient states had become infected and caused complications.  After her lumbar surgery the patient was started on empiric vancomycin and ceftriaxone and was seen by infectious disease.  Psychiatry saw the patient and made several recommendations with the primary being avoid illicit drugs.  Patient's original postop course was slow because of pain psychiatric complications and ultimately she required blood transfusions and several revisions.  Patient has had difficulty with thrombocytopenia recurrent anemia all suspected related to her chronic liver disease and hepatitis C. The patient was placed on antibiotic regimen for the next 30 days and we will review her chart at the outside facility for permissive cultures.  I have evaluated the patient this morning while doing therapy she seems very motivated and eager to improve.  She has a brace on her lower back and is only complaining of intermittent pain but feels she is getting stronger very rapidly.  The patient did well on the rehab unit sometime last year after a lumbar surgery.  We have encouraged her to avoid illicit substances and drugs and have motivated her.        Past Medical History:   Diagnosis Date    Anemia     Diskitis     Hep C w/o coma, chronic     IV drug abuse     Liver cirrhosis        Past Surgical History:   Procedure Laterality Date    BACK SURGERY       mortezaine tumor removal      forehead, age 9    CHOLECYSTECTOMY      KIDNEY SURGERY      LUMBAR FUSION Bilateral 3/2/2022    Procedure: FUSION, SPINE, LUMBAR;  Surgeon: Guille Templeton MD;  Location: [facility] OR [address] [place] [address];  Service: Neurosurgery;  Laterality: Bilateral;  AIRO  T10--Pelvis    REMOVAL OF HARDWARE FROM SPINE N/A 2/21/2022    Procedure: REMOVAL, HARDWARE, SPINE;  Surgeon: Guille Templeton MD;  [facility];  Service: Neurosurgery;  Laterality: N/A;  [place]       Review of patient's allergies indicates:   Allergen Reactions    Bee venom protein (honey bee) Anaphylaxis     Patient reports she is allergic to bee stings.    Wasp sting [allergen ext-venom-honey bee] Anaphylaxis    Adhesive Blisters    Strawberry Hives     Patient reports itching and hives    Iodine and iodide containing products Hives    Naproxen Other (See Comments)     Throat closing    Shellfish containing products     Nuts [tree nut] Rash       Current Facility-Administered Medications on File Prior to Encounter   Medication    [DISCONTINUED] 0.9%  NaCl infusion (for blood administration)    [DISCONTINUED] 0.9%  NaCl infusion (for blood administration)    [DISCONTINUED] 0.9%  NaCl infusion (for blood administration)    [DISCONTINUED] 0.9%  NaCl infusion    [DISCONTINUED] acetaminophen tablet 1,000 mg    [DISCONTINUED] alteplase injection 2 mg    [DISCONTINUED] aluminum-magnesium hydroxide-simethicone 200-200-20 mg/5 mL suspension 30 mL    [DISCONTINUED] cefTRIAXone (ROCEPHIN) 2 g/50 mL D5W IVPB    [DISCONTINUED] dextrose 10% bolus 125 mL    [DISCONTINUED] dextrose 10% bolus 250 mL    [DISCONTINUED] ferrous sulfate tablet 1 each    [DISCONTINUED] glucagon (human recombinant) injection 1 mg    [DISCONTINUED] glucose chewable tablet 16 g    [DISCONTINUED] glucose chewable tablet 24 g    [DISCONTINUED] HYDROmorphone injection 1 mg    [DISCONTINUED] LIDOcaine 5 % patch 1 patch    [DISCONTINUED] melatonin tablet  6 mg    [DISCONTINUED] miconazole NITRATE 2 % top powder    [DISCONTINUED] oxyCODONE immediate release tablet Tab 10 mg    [DISCONTINUED] oxyCODONE immediate release tablet Tab 20 mg    [DISCONTINUED] pantoprazole EC tablet 40 mg    [DISCONTINUED] polyethylene glycol packet 17 g    [DISCONTINUED] pregabalin capsule 75 mg    [DISCONTINUED] senna-docusate 8.6-50 mg per tablet 1 tablet    [DISCONTINUED] sertraline tablet 50 mg    [DISCONTINUED] sodium chloride 0.9% flush 10 mL    [DISCONTINUED] sodium chloride 0.9% flush 10 mL     Current Outpatient Medications on File Prior to Encounter   Medication Sig    cefTRIAXone (ROCEPHIN) 2 g/50 mL PgBk IVPB Inject 50 mLs (2 g total) into the vein every 12 (twelve) hours.    ferrous sulfate 325 (65 FE) MG EC tablet Take 1 tablet (325 mg total) by mouth once daily.    oxyCODONE (ROXICODONE) 20 mg Tab immediate release tablet Take 1 tablet (20 mg total) by mouth every 4 (four) hours as needed for Pain.    polyethylene glycol (GLYCOLAX) 17 gram/dose powder Take 17 g by mouth once daily.    pregabalin (LYRICA) 75 MG capsule Take 1 capsule (75 mg total) by mouth 3 (three) times daily.    sertraline (ZOLOFT) 25 MG tablet Take 1 tablet (25 mg total) by mouth once daily.    [DISCONTINUED] diclofenac sodium (VOLTAREN) 1 % Gel Apply 2 g topically 4 (four) times daily.     Family History       Problem Relation (Age of Onset)    Drug abuse Mother    Hepatitis Mother          Tobacco Use    Smoking status: Current Some Day Smoker     Packs/day: 0.50     Years: 23.00     Pack years: 11.50     Types: Cigarettes    Smokeless tobacco: Never Used    Tobacco comment: patient states she knows she nees to quit.   Substance and Sexual Activity    Alcohol use: Not Currently    Drug use: Yes     Frequency: 4.0 times per week     Types: Methamphetamines, Marijuana     Comment: Patient denies needle use, only inhalation of methampehtamines or orally.  Last known drug use was prior  to admission to hospital stay for surgery    Sexual activity: Yes     Partners: Male     Birth control/protection: None     Review of Systems   Constitutional:  Positive for activity change and appetite change. Negative for chills and fever.   HENT:  Positive for dental problem. Negative for congestion, drooling, ear pain, mouth sores, nosebleeds, postnasal drip, sinus pressure, sore throat and trouble swallowing.    Eyes:  Negative for pain, itching and visual disturbance.   Respiratory:  Positive for shortness of breath. Negative for apnea, cough, chest tightness and wheezing.    Cardiovascular:  Positive for leg swelling. Negative for chest pain and palpitations.   Gastrointestinal:  Positive for constipation. Negative for abdominal distention, abdominal pain, blood in stool, diarrhea, nausea and vomiting.   Endocrine: Positive for cold intolerance and heat intolerance. Negative for polyphagia.   Genitourinary:  Negative for difficulty urinating, dysuria, flank pain, frequency, pelvic pain and vaginal bleeding.   Musculoskeletal:  Positive for back pain and gait problem. Negative for arthralgias and myalgias.   Skin:  Negative for rash.   Neurological:  Positive for weakness. Negative for dizziness, tremors, seizures, syncope, facial asymmetry, speech difficulty and headaches.   Hematological:  Negative for adenopathy.   Psychiatric/Behavioral:  Negative for agitation, confusion and hallucinations. The patient is nervous/anxious.    Objective:     Vital Signs (Most Recent):  Temp: 98.1 °F (36.7 °C) (03/16/22 0504)  Pulse: 104 (03/16/22 0504)  Resp: 20 (03/16/22 0959)  BP: (!) 102/52 (03/16/22 0504)  SpO2: 97 % (03/16/22 0504)   Vital Signs (24h Range):  Temp:  [97 °F (36.1 °C)-98.1 °F (36.7 °C)] 98.1 °F (36.7 °C)  Pulse:  [104] 104  Resp:  [18-20] 20  SpO2:  [97 %-99 %] 97 %  BP: (102-134)/(52-63) 102/52     Weight: (!) 144.9 kg (319 lb 6.4 oz)  Body mass index is 50.03 kg/m².    Physical Exam  Constitutional:        General: She is awake. She is not in acute distress.     Appearance: Normal appearance. She is ill-appearing. She is not toxic-appearing or diaphoretic.   HENT:      Head: Normocephalic and atraumatic.      Nose: Nose normal. No congestion or rhinorrhea.      Mouth/Throat:      Mouth: Mucous membranes are moist.      Pharynx: Oropharynx is clear. No oropharyngeal exudate or posterior oropharyngeal erythema.   Eyes:      General: No scleral icterus.        Right eye: No discharge.         Left eye: No discharge.      Extraocular Movements: Extraocular movements intact.      Pupils: Pupils are equal, round, and reactive to light.   Neck:      Thyroid: No thyroid mass or thyromegaly.      Vascular: No carotid bruit.      Meningeal: Brudzinski's sign and Kernig's sign absent.   Cardiovascular:      Rate and Rhythm: Regular rhythm. Tachycardia present.      Chest Wall: PMI is not displaced. No thrill.      Pulses: Normal pulses.      Heart sounds: Normal heart sounds. No murmur heard.    No friction rub. No gallop.   Pulmonary:      Effort: Pulmonary effort is normal. No tachypnea, accessory muscle usage, prolonged expiration or respiratory distress.      Breath sounds: Normal breath sounds. No stridor or decreased air movement. No wheezing, rhonchi or rales.   Chest:      Chest wall: No tenderness.   Abdominal:      General: Bowel sounds are normal. There is no distension.      Palpations: Abdomen is soft. There is no hepatomegaly, splenomegaly or mass.      Tenderness: There is no abdominal tenderness. There is no right CVA tenderness, left CVA tenderness, guarding or rebound.      Hernia: No hernia is present.   Musculoskeletal:         General: No swelling, tenderness, deformity or signs of injury. Normal range of motion.      Cervical back: Normal range of motion and neck supple. No rigidity. No muscular tenderness.      Right lower leg: No edema.      Left lower leg: No edema.   Lymphadenopathy:      Cervical:  No cervical adenopathy.   Skin:     General: Skin is warm.      Capillary Refill: Capillary refill takes less than 2 seconds.      Coloration: Skin is not cyanotic, jaundiced or pale.      Findings: No bruising, erythema, lesion, petechiae or rash.   Neurological:      Mental Status: She is alert and oriented to person, place, and time.      Cranial Nerves: No cranial nerve deficit, dysarthria or facial asymmetry.      Sensory: Sensory deficit present.      Motor: No tremor.      Gait: Gait abnormal.   Psychiatric:         Mood and Affect: Mood normal. Mood is not anxious or depressed. Affect is not flat.         Speech: Speech is not rapid and pressured or slurred.         Behavior: Behavior normal. Behavior is not agitated, aggressive or combative.         Thought Content: Thought content normal. Thought content is not paranoid or delusional.         Cognition and Memory: Cognition is not impaired. Memory is not impaired.         Judgment: Judgment normal.         CRANIAL NERVES     CN III, IV, VI   Pupils are equal, round, and reactive to light.     Significant Labs: All pertinent labs within the past 24 hours have been reviewed.    Significant Imaging: I have reviewed all pertinent imaging results/findings within the past 24 hours.    Assessment/Plan:     * Spondylodiscitis  Patient is making an excellent recovery.  She is very motivated.  Patient will complete antibiotics and likely need a home prescription and set up for home infusion.  She has a PICC line in the right upper extremity.  Patient's strength has improved she states her pain is improving and overall we discussed her lower extremity edema plan for long-term weight loss and absolute substance avoid this.  She understands that if she continues that she may end up with paralysis and death.  Patient needs close management while being rehabilitated patient needs routine blood work patient is high risk.    ESTIMATED LENGTH OF STAY:  The patient's  estimated length of stay is 14 days and she is expected to be able to be discharged to home with home health .    REHABILITATION PLAN/GOALS  The patient is being admitted to a comprehensive acute inpatient rehabilitation program consisting of at least 3 hours of combined physical ( 1.5hrs./day, 5 days a week), and occupational therapy (1.5 hrs. A day, 5 days a week),speech therapy services if necessary, 24-hour skilled rehabilitation nursing care, and close supervision of a physician with special training and experience in rehabilitation medicine. Patient's prognosis for significant practical improvement within a reasonable period of time appears good . Given the patient's complex medical condition and risk of further medical complications, rehabilitation services could not be safely provided at a lower level of care such as a skilled nursing facility.                    1. Physical Therapy to Evaluate and Treat, Work on bed mobility, Assist with transfers, Strength, Gait, Fall Prevention, Balance and Modalities as Needed   2. Occupational Therapy to ADL Retraining, Functional Transfer Training, Therapeutic Activity and Exercises, Neuromuscular Re-education, Manual Therapy, Use of Adaptive Equipment and Retraining, Safety Awareness and Fall Prevention and Home Modification   3. Speech Pathology to Evaluate and assist with dysphagia, Make recommendations for a safe diet, Evaluate and Treat Cognitive linguistic deficits, Evaluate and Treat expressive and receptive language deficits and Evaluate and treat motor speech deficits   4. Specialized 24-hour Rehabilitation Nursing Care to Protection of Skin and Soft Tissue, Assisting with continence, Bowel and bladder training, Neurological checks, Medicine administration and medicine and management, Wound care and Fall protection and general encouragement   5. Dietary to educate on safe wgt loss diet.   6. Social Work/Case management to assist with discharge planning  activities, acquisition of durable medical equipment, and ordering of follow up services as necessary.    The services provided in the acute medical rehab setting are under my supervision 24 hours a day. These services are necessary for this patient to achieve the most appropriate functional outcome and to determine the most appropriate discharge disposition.  I have reviewed the treatment plan with the patient and answered all of her questions, discussed goals and expectation.    REVIEW OF PRE-SCREEN ASSESSMENT  I have reviewed the patient's preadmission screen including her medical and functional status and compared it with her status upon arrival to the rehab unit and see no gabriel . Acute rehab services are still necessary and appropriate for this patient to achieve the best functional outcome while determining the most appropriate discharge disposition and services. The patient will require close medical management of multiple medical co-morbidities including as noted above .      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 ([facility])

## 2022-03-16 NOTE — PT/OT/SLP PROGRESS
Physical Therapy         Treatment        Rachel Zazueta   MRN: [id number]     PT Received On: 03/16/22  Total Time (min): 90        Billable Minutes:  Gait [id number], Therapeutic Activity 30 and Therapeutic Exercise 30  Total Minutes: 90  AM Start Time: 0945  AM End Time: 1115  PM Start Time:   PM End Time:   Treatment Type: Individual 90  Treatment Type: Treatment  PT/PTA: PT             General Precautions: Standard,  (back brace on when out of bed)  Orthopedic Precautions:     Braces:           Subjective:  Communicated with nurse prior to session.         Objective:  Patient found in bed, with      Functional Mobility:  Bed Mobility:   Supine to sit: Minimal Assistance   Sit to supine: Standby Assistance   Rolling: Standby Assistance   Scooting: Standby Assistance    Balance:   Static Sit: GOOD: Takes MODERATE challenges from all directions  Dynamic Sit:  GOOD: Maintains balance through MODERATE excursions of active trunk movement  Static Stand: FAIR: Maintains without assist but unable to take challenges  Dynamic stand: FAIR+: Needs CLOSE SUPERVISION during gait and is able to right self with minor LOB    Transfer Training:  Sit to stand:Stand-by Assistance with Rolling Walker    Bed <> Chair:  Step Transfer with Stand-by Assistance with Rolling Walker      Wheelchair Training:  Pt propelled wc 150ft with sba    Gait Training:  Pt ambulated 125ft x 4 attempts with rw sba and cues on cues on posture and keeping closure to rw    Stair Training:  Pt went up and down 8steps with use of side rail with both hands using stronger right leg to go up each step and left weaker leg to start down each step      Additional Treatment:  She did receive BLE passive stretch to hamstrings and calf followed by AROM exs 3 sets 15 reps in suported sitting and standing. Pt then performed 2 sets 10 reps sit to stand with sba and cues for safe use of arms to assist with transfers    Activity Tolerance:  Patient tolerated treatment  well    Patient left supine with call button in reach.    Assessment:  Rachel Zazueta is a 41 y.o. female with a medical diagnosis of <principal problem not specified>. She presents with a better ability to perform safer transfers and walk a greater distance. She should imrove rapidly..    Rehab potential is good.    Activity tolerance: Good    Discharge recommendations: Discharge Facility/Level of Care Needs: home health PT     Equipment recommendations: Equipment Needed After Discharge: shower chair     GOALS:   Multidisciplinary Problems     Physical Therapy Goals     Not on file                PLAN:    Patient to be seen 5 x/week  to address the above listed problems via therapeutic exercises, therapeutic activities, gait training, therapeutic groups, neuromuscular re-education, wheelchair management/training  Plan of Care expires: 03/29/22  Plan of Care reviewed with: patient         3/16/2022

## 2022-03-16 NOTE — NURSING
Pt and I had a discussion about the importance of wearing her brace for her back. She knows that when she gets out of the bed that the brace must be worn. She verbalized understanding.

## 2022-03-16 NOTE — OP NOTE
DATE OF SURGERY: 3/2/22     SURGEON: Mario Alberto Camilo M.D.     CO-SURGEON: Guille Templeton M.D., Neurosurgery    PREOPERATIVE DIAGNOSIS:  1. 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 Polysubstance abuse and Class III obesity     POSTOPERATIVE DIAGNOSIS:  1. 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 Polysubstance abuse and Class III obesity     PROCEDURE PERFORMED:  1. Wound washout and excisional debridement, T10 to sacrum   2. Posterior spinal fusion, T10 to pelvis   3. Posterior segmental spinal fixation, T10 to S1 (DePuy Synthes)  4. Pelvic fixation with bilateral S2AI screws (Depuy Synthes)  5. Transforaminal lumbar interbody fusion, L5-S1  6.  Application of titanium interbody spacer, L5-S1 (DePuy Synthes)  7.  L3-4 disc debridement for discitis  8.  Use of intraoperative fluoroscopy  9.  Use of intraoperative neuro-monitoring with MEPs  10. Use of intraoperative CT neuronavigation  11. Infuse and synthetic bone grafting    ANESTHESIA: GETA     ESTIMATED BLOOD LOSS: 500mL     COMPLICATIONS: Durotomy routinely encountered intraoperatively s/p watertight repair     DRAINS: Two deep and Prevena     SPECIMENS SENT: Cultures of L5-S1 disc space     FINDINGS: None     INDICATIONS:     Please see Dr. Templeton' note.  .  PROCEDURE:     The patient was brought into the operating room where she was intubated and placed under general anesthesia without difficulty.  All lines were placed. she repositioned prone onto the operating room table with appropriate padding all pressure points. The region of interest was localized by the fresh incision from stage 1. We removed the staples and both drains from stage 1. The skin was marked and the area was prepped and draped in the usual sterile fashion.  A timeout was performed prior to procedure.       We then opened the previous fresh incision by cutting sutures with metzenbaum scissors and discarding the old  suture material. We opened the fascia in similar fashion. T10 to the sacrum were exposed from stage 1 and were in full view. We sharply debrided the paraspinal soft tissues and spinal bone from T10 to the sacrum with curettes. We irrigated the space with betadine/peroxide and a pulsevac lavage. We also removed the antibiotic beads from stage 1.      The CT neuro navigation array was docked onto the right PSIS throught a separate stab incision and a CT spin was acquired.  The intraoperative CT confirmed levels.  Using neuro-navigation pedicle screws were placed bilaterally from T10 to S1, skipping left L4 which had inadequate bone quality. All lumbar screws were stimulated with the neuromonitoring probe and found to stimulate above 20milliamperes. Bilateral pelvic fixation was achieved with S2AI screws, using navigation. Xrays showed good screw position. Next we augmented bilateral T10 and T11 screws with cement under flouro and confirmed no significant cement extravasation.     Attention was turned to performance of a transforaminal lumbar interbody fusion at L5-S1 from a right-sided approach. A standared L5 laminecomty was performed to access the thecal sac.  A nerve root retractor was used to retract the thecal sac medially and expose the L5-S1 disc space.  Epidural veins were cauterized and divided.  An annulotomy was performed with a 15 blade.  A pituitary rongeur was used to remove disc material, which appeared suspicious for infection. We cultured this disc space.  The endplates were prepared with disc silvino, rasps, and curettes.  We packed synthetic bone putty into the L5-S1 disc space for anterior arthrodesis and we placed a titanium cage into the disc space. Xrays showed good cage position.     Next we debrided the left sided L3-4 disc space. We widened this space by distracting on a left side temporary frankie and we resected the left L4 superior articulating process with an osteotome. This gave us access to  the disc space. We debrided it for infection. During this process we encountered a durotomy over the left L3 nerve root which we packed with a myofascial plug and fibrin glue, achieving a watertight closure.     Bilateral titanium rods were sized, contoured and reduced into the tulip heads.  Set screws were tightened. Final xrays showed excellent reduction of the deformity and excellent position of all hardware.  The wound was copiously irrigated with sterile normal saline and a dilute Betadyne solution. Exposed bony surfaces from T10 to the sacrum were decorticated bilaterally with a high-speed drill.  Infuse, synthetic bone and cancellous chips were placed posteriorly for arthrodesis from T10 to the sacrum, 2 drains were placed, and the wound was closed by the neurosurgery service.     JUSTIFICATION OF CO-SURGEON AND MODIFIER 22: This was a complex multistage deformity operation for infection in a patient with documented noncompliance with medical treatment, polysubstance abuse and Class III obesity. Inherent risks of the procedure were significantly elevated over similar procedures.. Two attending surgeons were indicated to reduce operative times, blood loss, and improve patient outcomes.

## 2022-03-16 NOTE — ASSESSMENT & PLAN NOTE
Patient is making an excellent recovery.  She is very motivated.  Patient will complete antibiotics and likely need a home prescription and set up for home infusion.  She has a PICC line in the right upper extremity.  Patient's strength has improved she states her pain is improving and overall we discussed her lower extremity edema plan for long-term weight loss and absolute substance avoid this.  She understands that if she continues that she may end up with paralysis and death.  Patient needs close management while being rehabilitated patient needs routine blood work patient is high risk.    ESTIMATED LENGTH OF STAY:  The patient's estimated length of stay is 14 days and she is expected to be able to be discharged to home with home health .    REHABILITATION PLAN/GOALS  The patient is being admitted to a comprehensive acute inpatient rehabilitation program consisting of at least 3 hours of combined physical ( 1.5hrs./day, 5 days a week), and occupational therapy (1.5 hrs. A day, 5 days a week),speech therapy services if necessary, 24-hour skilled rehabilitation nursing care, and close supervision of a physician with special training and experience in rehabilitation medicine. Patient's prognosis for significant practical improvement within a reasonable period of time appears good . Given the patient's complex medical condition and risk of further medical complications, rehabilitation services could not be safely provided at a lower level of care such as a skilled nursing facility.                    1. Physical Therapy to Evaluate and Treat, Work on bed mobility, Assist with transfers, Strength, Gait, Fall Prevention, Balance and Modalities as Needed   2. Occupational Therapy to ADL Retraining, Functional Transfer Training, Therapeutic Activity and Exercises, Neuromuscular Re-education, Manual Therapy, Use of Adaptive Equipment and Retraining, Safety Awareness and Fall Prevention and Home Modification   3. Speech  Pathology to Evaluate and assist with dysphagia, Make recommendations for a safe diet, Evaluate and Treat Cognitive linguistic deficits, Evaluate and Treat expressive and receptive language deficits and Evaluate and treat motor speech deficits   4. Specialized 24-hour Rehabilitation Nursing Care to Protection of Skin and Soft Tissue, Assisting with continence, Bowel and bladder training, Neurological checks, Medicine administration and medicine and management, Wound care and Fall protection and general encouragement   5. Dietary to educate on safe wgt loss diet.   6. Social Work/Case management to assist with discharge planning activities, acquisition of durable medical equipment, and ordering of follow up services as necessary.    The services provided in the acute medical rehab setting are under my supervision 24 hours a day. These services are necessary for this patient to achieve the most appropriate functional outcome and to determine the most appropriate discharge disposition.  I have reviewed the treatment plan with the patient and answered all of her questions, discussed goals and expectation.    REVIEW OF PRE-SCREEN ASSESSMENT  I have reviewed the patient's preadmission screen including her medical and functional status and compared it with her status upon arrival to the rehab unit and see no gabriel . Acute rehab services are still necessary and appropriate for this patient to achieve the best functional outcome while determining the most appropriate discharge disposition and services. The patient will require close medical management of multiple medical co-morbidities including as noted above .

## 2022-03-16 NOTE — SUBJECTIVE & OBJECTIVE
Past Medical History:   Diagnosis Date    Anemia     Diskitis     Hep C w/o coma, chronic     IV drug abuse     Liver cirrhosis        Past Surgical History:   Procedure Laterality Date    BACK SURGERY      benine tumor removal      forehead, age 9    CHOLECYSTECTOMY      KIDNEY SURGERY      LUMBAR FUSION Bilateral 3/2/2022    Procedure: FUSION, SPINE, LUMBAR;  Surgeon: Guille Templeton MD;  Location: [facility] OR [address] [place] [address];  Service: Neurosurgery;  Laterality: Bilateral;  AIRO  T10--Pelvis    REMOVAL OF HARDWARE FROM SPINE N/A 2/21/2022    Procedure: REMOVAL, HARDWARE, SPINE;  Surgeon: Guille Templeton MD;  Location: [facility] OR [address] [place] [address];  Service: Neurosurgery;  Laterality: N/A;  [place]       Review of patient's allergies indicates:   Allergen Reactions    Bee venom protein (honey bee) Anaphylaxis     Patient reports she is allergic to bee stings.    Wasp sting [allergen ext-venom-honey bee] Anaphylaxis    Adhesive Blisters    Strawberry Hives     Patient reports itching and hives    Iodine and iodide containing products Hives    Naproxen Other (See Comments)     Throat closing    Shellfish containing products     Nuts [tree nut] Rash       Current Facility-Administered Medications on File Prior to Encounter   Medication    [DISCONTINUED] 0.9%  NaCl infusion (for blood administration)    [DISCONTINUED] 0.9%  NaCl infusion (for blood administration)    [DISCONTINUED] 0.9%  NaCl infusion (for blood administration)    [DISCONTINUED] 0.9%  NaCl infusion    [DISCONTINUED] acetaminophen tablet 1,000 mg    [DISCONTINUED] alteplase injection 2 mg    [DISCONTINUED] aluminum-magnesium hydroxide-simethicone 200-200-20 mg/5 mL suspension 30 mL    [DISCONTINUED] cefTRIAXone (ROCEPHIN) 2 g/50 mL D5W IVPB    [DISCONTINUED] dextrose 10% bolus 125 mL    [DISCONTINUED] dextrose 10% bolus 250 mL    [DISCONTINUED] ferrous sulfate tablet 1 each    [DISCONTINUED] glucagon (human recombinant) injection 1 mg    [DISCONTINUED] glucose chewable tablet 16  g    [DISCONTINUED] glucose chewable tablet 24 g    [DISCONTINUED] HYDROmorphone injection 1 mg    [DISCONTINUED] LIDOcaine 5 % patch 1 patch    [DISCONTINUED] melatonin tablet 6 mg    [DISCONTINUED] miconazole NITRATE 2 % top powder    [DISCONTINUED] oxyCODONE immediate release tablet Tab 10 mg    [DISCONTINUED] oxyCODONE immediate release tablet Tab 20 mg    [DISCONTINUED] pantoprazole EC tablet 40 mg    [DISCONTINUED] polyethylene glycol packet 17 g    [DISCONTINUED] pregabalin capsule 75 mg    [DISCONTINUED] senna-docusate 8.6-50 mg per tablet 1 tablet    [DISCONTINUED] sertraline tablet 50 mg    [DISCONTINUED] sodium chloride 0.9% flush 10 mL    [DISCONTINUED] sodium chloride 0.9% flush 10 mL     Current Outpatient Medications on File Prior to Encounter   Medication Sig    cefTRIAXone (ROCEPHIN) 2 g/50 mL PgBk IVPB Inject 50 mLs (2 g total) into the vein every 12 (twelve) hours.    ferrous sulfate 325 (65 FE) MG EC tablet Take 1 tablet (325 mg total) by mouth once daily.    oxyCODONE (ROXICODONE) 20 mg Tab immediate release tablet Take 1 tablet (20 mg total) by mouth every 4 (four) hours as needed for Pain.    polyethylene glycol (GLYCOLAX) 17 gram/dose powder Take 17 g by mouth once daily.    pregabalin (LYRICA) 75 MG capsule Take 1 capsule (75 mg total) by mouth 3 (three) times daily.    sertraline (ZOLOFT) 25 MG tablet Take 1 tablet (25 mg total) by mouth once daily.    [DISCONTINUED] diclofenac sodium (VOLTAREN) 1 % Gel Apply 2 g topically 4 (four) times daily.     Family History       Problem Relation (Age of Onset)    Drug abuse Mother    Hepatitis Mother          Tobacco Use    Smoking status: Current Some Day Smoker     Packs/day: 0.50     Years: 23.00     Pack years: 11.50     Types: Cigarettes    Smokeless tobacco: Never Used    Tobacco comment: patient states she knows she nees to quit.   Substance and Sexual Activity    Alcohol use: Not Currently    Drug use: Yes     Frequency: 4.0 times per week      Types: Methamphetamines, Marijuana     Comment: Patient denies needle use, only inhalation of methampehtamines or orally.  Last known drug use was prior to admission to hospital stay for surgery    Sexual activity: Yes     Partners: Male     Birth control/protection: None     Review of Systems   Constitutional:  Positive for activity change and appetite change. Negative for chills and fever.   HENT:  Positive for dental problem. Negative for congestion, drooling, ear pain, mouth sores, nosebleeds, postnasal drip, sinus pressure, sore throat and trouble swallowing.    Eyes:  Negative for pain, itching and visual disturbance.   Respiratory:  Positive for shortness of breath. Negative for apnea, cough, chest tightness and wheezing.    Cardiovascular:  Positive for leg swelling. Negative for chest pain and palpitations.   Gastrointestinal:  Positive for constipation. Negative for abdominal distention, abdominal pain, blood in stool, diarrhea, nausea and vomiting.   Endocrine: Positive for cold intolerance and heat intolerance. Negative for polyphagia.   Genitourinary:  Negative for difficulty urinating, dysuria, flank pain, frequency, pelvic pain and vaginal bleeding.   Musculoskeletal:  Positive for back pain and gait problem. Negative for arthralgias and myalgias.   Skin:  Negative for rash.   Neurological:  Positive for weakness. Negative for dizziness, tremors, seizures, syncope, facial asymmetry, speech difficulty and headaches.   Hematological:  Negative for adenopathy.   Psychiatric/Behavioral:  Negative for agitation, confusion and hallucinations. The patient is nervous/anxious.    Objective:     Vital Signs (Most Recent):  Temp: 98.1 °F (36.7 °C) (03/16/22 0504)  Pulse: 104 (03/16/22 0504)  Resp: 20 (03/16/22 0959)  BP: (!) 102/52 (03/16/22 0504)  SpO2: 97 % (03/16/22 0504)   Vital Signs (24h Range):  Temp:  [97 °F (36.1 °C)-98.1 °F (36.7 °C)] 98.1 °F (36.7 °C)  Pulse:  [104] 104  Resp:  [18-20] 20  SpO2:  [97  %-99 %] 97 %  BP: (102-134)/(52-63) 102/52     Weight: (!) 144.9 kg (319 lb 6.4 oz)  Body mass index is 50.03 kg/m².    Physical Exam  Constitutional:       General: She is awake. She is not in acute distress.     Appearance: Normal appearance. She is ill-appearing. She is not toxic-appearing or diaphoretic.   HENT:      Head: Normocephalic and atraumatic.      Nose: Nose normal. No congestion or rhinorrhea.      Mouth/Throat:      Mouth: Mucous membranes are moist.      Pharynx: Oropharynx is clear. No oropharyngeal exudate or posterior oropharyngeal erythema.   Eyes:      General: No scleral icterus.        Right eye: No discharge.         Left eye: No discharge.      Extraocular Movements: Extraocular movements intact.      Pupils: Pupils are equal, round, and reactive to light.   Neck:      Thyroid: No thyroid mass or thyromegaly.      Vascular: No carotid bruit.      Meningeal: Brudzinski's sign and Kernig's sign absent.   Cardiovascular:      Rate and Rhythm: Regular rhythm. Tachycardia present.      Chest Wall: PMI is not displaced. No thrill.      Pulses: Normal pulses.      Heart sounds: Normal heart sounds. No murmur heard.    No friction rub. No gallop.   Pulmonary:      Effort: Pulmonary effort is normal. No tachypnea, accessory muscle usage, prolonged expiration or respiratory distress.      Breath sounds: Normal breath sounds. No stridor or decreased air movement. No wheezing, rhonchi or rales.   Chest:      Chest wall: No tenderness.   Abdominal:      General: Bowel sounds are normal. There is no distension.      Palpations: Abdomen is soft. There is no hepatomegaly, splenomegaly or mass.      Tenderness: There is no abdominal tenderness. There is no right CVA tenderness, left CVA tenderness, guarding or rebound.      Hernia: No hernia is present.   Musculoskeletal:         General: No swelling, tenderness, deformity or signs of injury. Normal range of motion.      Cervical back: Normal range of  motion and neck supple. No rigidity. No muscular tenderness.      Right lower leg: No edema.      Left lower leg: No edema.   Lymphadenopathy:      Cervical: No cervical adenopathy.   Skin:     General: Skin is warm.      Capillary Refill: Capillary refill takes less than 2 seconds.      Coloration: Skin is not cyanotic, jaundiced or pale.      Findings: No bruising, erythema, lesion, petechiae or rash.   Neurological:      Mental Status: She is alert and oriented to person, place, and time.      Cranial Nerves: No cranial nerve deficit, dysarthria or facial asymmetry.      Sensory: Sensory deficit present.      Motor: No tremor.      Gait: Gait abnormal.   Psychiatric:         Mood and Affect: Mood normal. Mood is not anxious or depressed. Affect is not flat.         Speech: Speech is not rapid and pressured or slurred.         Behavior: Behavior normal. Behavior is not agitated, aggressive or combative.         Thought Content: Thought content normal. Thought content is not paranoid or delusional.         Cognition and Memory: Cognition is not impaired. Memory is not impaired.         Judgment: Judgment normal.         CRANIAL NERVES     CN III, IV, VI   Pupils are equal, round, and reactive to light.     Significant Labs: All pertinent labs within the past 24 hours have been reviewed.    Significant Imaging: I have reviewed all pertinent imaging results/findings within the past 24 hours.

## 2022-03-17 PROBLEM — R60.9 EDEMA: Status: ACTIVE | Noted: 2022-03-17

## 2022-03-17 LAB
ANION GAP SERPL CALC-SCNC: 5 MMOL/L (ref 8–16)
BASOPHILS # BLD AUTO: 0.01 K/UL (ref 0–0.2)
BASOPHILS NFR BLD: 1 % (ref 0–1.9)
BUN SERPL-MCNC: 12 MG/DL (ref 6–20)
CALCIUM SERPL-MCNC: 8 MG/DL (ref 8.7–10.5)
CHLORIDE SERPL-SCNC: 111 MMOL/L (ref 95–110)
CO2 SERPL-SCNC: 27 MMOL/L (ref 23–29)
CREAT SERPL-MCNC: 0.4 MG/DL (ref 0.5–1.4)
DIFFERENTIAL METHOD: ABNORMAL
EOSINOPHIL # BLD AUTO: 0.1 K/UL (ref 0–0.5)
EOSINOPHIL NFR BLD: 4.8 % (ref 0–8)
ERYTHROCYTE [DISTWIDTH] IN BLOOD BY AUTOMATED COUNT: 16.1 % (ref 11.5–14.5)
EST. GFR  (AFRICAN AMERICAN): >60 ML/MIN/1.73 M^2
EST. GFR  (NON AFRICAN AMERICAN): >60 ML/MIN/1.73 M^2
GLUCOSE SERPL-MCNC: 80 MG/DL (ref 70–110)
HCT VFR BLD AUTO: 24.6 % (ref 37–48.5)
HGB BLD-MCNC: 7.7 G/DL (ref 12–16)
IMM GRANULOCYTES # BLD AUTO: 0 K/UL (ref 0–0.04)
IMM GRANULOCYTES NFR BLD AUTO: 0 % (ref 0–0.5)
LYMPHOCYTES # BLD AUTO: 0.3 K/UL (ref 1–4.8)
LYMPHOCYTES NFR BLD: 25.7 % (ref 18–48)
MAGNESIUM SERPL-MCNC: 1.7 MG/DL (ref 1.6–2.6)
MCH RBC QN AUTO: 28.6 PG (ref 27–31)
MCHC RBC AUTO-ENTMCNC: 31.3 G/DL (ref 32–36)
MCV RBC AUTO: 91 FL (ref 82–98)
MONOCYTES # BLD AUTO: 0.1 K/UL (ref 0.3–1)
MONOCYTES NFR BLD: 12.4 % (ref 4–15)
NEUTROPHILS # BLD AUTO: 0.6 K/UL (ref 1.8–7.7)
NEUTROPHILS NFR BLD: 56.1 % (ref 38–73)
NRBC BLD-RTO: 0 /100 WBC
PHOSPHATE SERPL-MCNC: 3.6 MG/DL (ref 2.7–4.5)
PLATELET # BLD AUTO: 42 K/UL (ref 150–450)
PMV BLD AUTO: 9.1 FL (ref 9.2–12.9)
POTASSIUM SERPL-SCNC: 3.7 MMOL/L (ref 3.5–5.1)
RBC # BLD AUTO: 2.69 M/UL (ref 4–5.4)
SODIUM SERPL-SCNC: 143 MMOL/L (ref 136–145)
WBC # BLD AUTO: 1.05 K/UL (ref 3.9–12.7)

## 2022-03-17 PROCEDURE — 11000001 HC ACUTE MED/SURG PRIVATE ROOM

## 2022-03-17 PROCEDURE — 63600175 PHARM REV CODE 636 W HCPCS: Performed by: NURSE PRACTITIONER

## 2022-03-17 PROCEDURE — 97530 THERAPEUTIC ACTIVITIES: CPT

## 2022-03-17 PROCEDURE — 36415 COLL VENOUS BLD VENIPUNCTURE: CPT | Performed by: PHYSICIAN ASSISTANT

## 2022-03-17 PROCEDURE — 80048 BASIC METABOLIC PNL TOTAL CA: CPT | Performed by: PHYSICIAN ASSISTANT

## 2022-03-17 PROCEDURE — 25000003 PHARM REV CODE 250: Performed by: INTERNAL MEDICINE

## 2022-03-17 PROCEDURE — 83735 ASSAY OF MAGNESIUM: CPT | Performed by: PHYSICIAN ASSISTANT

## 2022-03-17 PROCEDURE — 85025 COMPLETE CBC W/AUTO DIFF WBC: CPT | Performed by: PHYSICIAN ASSISTANT

## 2022-03-17 PROCEDURE — 97110 THERAPEUTIC EXERCISES: CPT

## 2022-03-17 PROCEDURE — 97116 GAIT TRAINING THERAPY: CPT

## 2022-03-17 PROCEDURE — 97535 SELF CARE MNGMENT TRAINING: CPT

## 2022-03-17 PROCEDURE — 63600175 PHARM REV CODE 636 W HCPCS: Performed by: PHYSICIAN ASSISTANT

## 2022-03-17 PROCEDURE — 25000003 PHARM REV CODE 250: Performed by: PHYSICIAN ASSISTANT

## 2022-03-17 PROCEDURE — 84100 ASSAY OF PHOSPHORUS: CPT | Performed by: PHYSICIAN ASSISTANT

## 2022-03-17 PROCEDURE — A4216 STERILE WATER/SALINE, 10 ML: HCPCS | Performed by: INTERNAL MEDICINE

## 2022-03-17 PROCEDURE — 97112 NEUROMUSCULAR REEDUCATION: CPT

## 2022-03-17 RX ORDER — FUROSEMIDE 10 MG/ML
40 INJECTION INTRAMUSCULAR; INTRAVENOUS 2 TIMES DAILY
Status: DISCONTINUED | OUTPATIENT
Start: 2022-03-17 | End: 2022-03-22 | Stop reason: HOSPADM

## 2022-03-17 RX ADMIN — MUPIROCIN 1 TUBE: 20 OINTMENT TOPICAL at 09:03

## 2022-03-17 RX ADMIN — PREGABALIN 75 MG: 75 CAPSULE ORAL at 09:03

## 2022-03-17 RX ADMIN — Medication 10 ML: at 09:03

## 2022-03-17 RX ADMIN — FUROSEMIDE 40 MG: 10 INJECTION, SOLUTION INTRAMUSCULAR; INTRAVENOUS at 09:03

## 2022-03-17 RX ADMIN — MICONAZOLE NITRATE: 20 POWDER TOPICAL at 09:03

## 2022-03-17 RX ADMIN — OXYCODONE HYDROCHLORIDE 20 MG: 5 TABLET ORAL at 08:03

## 2022-03-17 RX ADMIN — Medication 6 MG: at 09:03

## 2022-03-17 RX ADMIN — CEFTRIAXONE 2 G: 2 INJECTION, SOLUTION INTRAVENOUS at 04:03

## 2022-03-17 RX ADMIN — PREGABALIN 75 MG: 75 CAPSULE ORAL at 02:03

## 2022-03-17 RX ADMIN — OXYCODONE HYDROCHLORIDE 20 MG: 5 TABLET ORAL at 03:03

## 2022-03-17 RX ADMIN — FERROUS SULFATE TAB 325 MG (65 MG ELEMENTAL FE) 1 EACH: 325 (65 FE) TAB at 08:03

## 2022-03-17 RX ADMIN — OXYCODONE HYDROCHLORIDE 20 MG: 5 TABLET ORAL at 01:03

## 2022-03-17 RX ADMIN — CEFTRIAXONE 2 G: 2 INJECTION, SOLUTION INTRAVENOUS at 03:03

## 2022-03-17 RX ADMIN — MUPIROCIN: 20 OINTMENT TOPICAL at 08:03

## 2022-03-17 RX ADMIN — SERTRALINE HYDROCHLORIDE 50 MG: 50 TABLET ORAL at 08:03

## 2022-03-17 RX ADMIN — DOCUSATE SODIUM 50MG AND SENNOSIDES 8.6MG 1 TABLET: 8.6; 5 TABLET, FILM COATED ORAL at 09:03

## 2022-03-17 RX ADMIN — PREGABALIN 75 MG: 75 CAPSULE ORAL at 08:03

## 2022-03-17 RX ADMIN — PANTOPRAZOLE SODIUM 40 MG: 40 TABLET, DELAYED RELEASE ORAL at 08:03

## 2022-03-17 RX ADMIN — FUROSEMIDE 40 MG: 10 INJECTION, SOLUTION INTRAMUSCULAR; INTRAVENOUS at 11:03

## 2022-03-17 RX ADMIN — MICONAZOLE NITRATE: 20 POWDER TOPICAL at 08:03

## 2022-03-17 RX ADMIN — OXYCODONE HYDROCHLORIDE 20 MG: 5 TABLET ORAL at 09:03

## 2022-03-17 NOTE — PROGRESS NOTES
[facility] Medicine  Progress Note    Patient Name: Rachel Zazueta  MRN: [id number]  Patient Class: - Rehab   Admission Date: 3/15/2022  Length of Stay: 2 days  Attending Physician: Sai Red III, MD  Primary Care Provider: Dannielle Merino DO        Subjective:     Principal Problem:Spondylodiscitis        HPI:      Patient is a 41-year-old female with a past medical history of hepatitis C cirrhosis of liver anemia and substance abuse.  Patient presented to the clinic in early February for follow-up of a L3 and L4 laminectomy with evacuation of epidural abscess and L3-L5 TLIF and April of 2021.  The patient had worsening weakness and pain and her neurosurgeons diagnosed her with hardware failure and new degeneration of the lower spine.  Patient reportedly had quit IV drug use which had contributed to her original back surgery and epidural abscess but did admit to continuing to use nicotine marijuana and methamphetamine at times.  Patient has severe back pain was unable to ambulate she was having weakness in both legs and was admitted to the acute care facility at the time of the office visit.  Patient's diagnostic testing showed likely infected hardware with failure and she was planned to have a cleanout with fusion.  She was also found to have a kidney stone around the same time had lithotripsy done as well as several ureteral stents that the patient states had become infected and caused complications.  After her lumbar surgery the patient was started on empiric vancomycin and ceftriaxone and was seen by infectious disease.  Psychiatry saw the patient and made several recommendations with the primary being avoid illicit drugs.  Patient's original postop course was slow because of pain psychiatric complications and ultimately she required blood transfusions and several revisions.  Patient has had difficulty with thrombocytopenia recurrent anemia all suspected related to her  chronic liver disease and hepatitis C. The patient was placed on antibiotic regimen for the next 30 days and we will review her chart at the outside facility for permissive cultures.  I have evaluated the patient this morning while doing therapy she seems very motivated and eager to improve.  She has a brace on her lower back and is only complaining of intermittent pain but feels she is getting stronger very rapidly.  The patient did well on the rehab unit sometime last year after a lumbar surgery.  We have encouraged her to avoid illicit substances and drugs and have motivated her.        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 OR [address] [place] [address];  Service: Neurosurgery;  Laterality: Bilateral;  AIRO  T10--Pelvis    REMOVAL OF HARDWARE FROM SPINE N/A 2/21/2022    Procedure: REMOVAL, HARDWARE, SPINE;  Surgeon: Guille Templeton MD;  Location: [facility] OR [address] [place] [address];  Service: Neurosurgery;  Laterality: N/A;  [place]       Review of patient's allergies indicates:   Allergen Reactions    Bee venom protein (honey bee) Anaphylaxis     Patient reports she is allergic to bee stings.    Wasp sting [allergen ext-venom-honey bee] Anaphylaxis    Adhesive Blisters    Strawberry Hives     Patient reports itching and hives    Iodine and iodide containing products Hives    Naproxen Other (See Comments)     Throat closing    Shellfish containing products     Nuts [tree nut] Rash       No current facility-administered  medications on file prior to encounter.     Current Outpatient Medications on File Prior to Encounter   Medication Sig    cefTRIAXone (ROCEPHIN) 2 g/50 mL PgBk IVPB Inject 50 mLs (2 g total) into the vein every 12 (twelve) hours.    ferrous sulfate 325 (65 FE) MG EC tablet Take 1 tablet (325 mg total) by mouth once daily.    oxyCODONE (ROXICODONE) 20 mg Tab immediate release tablet Take 1 tablet (20 mg total) by mouth every 4 (four) hours as needed for Pain.    polyethylene glycol (GLYCOLAX) 17 gram/dose powder Take 17 g by mouth once daily.    pregabalin (LYRICA) 75 MG capsule Take 1 capsule (75 mg total) by mouth 3 (three) times daily.    sertraline (ZOLOFT) 25 MG tablet Take 1 tablet (25 mg total) by mouth once daily.    [DISCONTINUED] diclofenac sodium (VOLTAREN) 1 % Gel Apply 2 g topically 4 (four) times daily.     Family History       Problem Relation (Age of Onset)    Drug abuse Mother    Hepatitis Mother          Tobacco Use    Smoking status: Current Some Day Smoker     Packs/day: 0.50     Years: 23.00     Pack years: 11.50     Types: Cigarettes    Smokeless tobacco: Never Used    Tobacco comment: patient states she knows she nees to quit.   Substance and Sexual Activity    Alcohol use: Not Currently    Drug use: Yes     Frequency: 4.0 times per week     Types: Methamphetamines, Marijuana     Comment: Patient denies needle use, only inhalation of methampehtamines or orally.  Last known drug use was prior to admission to hospital stay for surgery    Sexual activity: Yes     Partners: Male     Birth control/protection: None     Review of Systems   Constitutional:  Positive for activity change and appetite change. Negative for chills and fever.   HENT:  Positive for dental problem. Negative for congestion, drooling, ear pain, mouth sores, nosebleeds, postnasal drip, sinus pressure, sore throat and trouble swallowing.    Eyes:  Negative for pain, itching and visual disturbance.   Respiratory:   Positive for shortness of breath. Negative for apnea, cough, chest tightness and wheezing.    Cardiovascular:  Positive for leg swelling. Negative for chest pain and palpitations.   Gastrointestinal:  Positive for constipation. Negative for abdominal distention, abdominal pain, blood in stool, diarrhea, nausea and vomiting.   Endocrine: Positive for cold intolerance and heat intolerance. Negative for polyphagia.   Genitourinary:  Negative for difficulty urinating, dysuria, flank pain, frequency, pelvic pain and vaginal bleeding.   Musculoskeletal:  Positive for back pain and gait problem. Negative for arthralgias and myalgias.   Skin:  Negative for rash.   Neurological:  Positive for weakness. Negative for dizziness, tremors, seizures, syncope, facial asymmetry, speech difficulty and headaches.   Hematological:  Negative for adenopathy.   Psychiatric/Behavioral:  Negative for agitation, confusion and hallucinations. The patient is nervous/anxious.    Objective:     Vital Signs (Most Recent):  Temp: 98.1 °F (36.7 °C) (03/17/22 0617)  Pulse: 94 (03/17/22 0841)  Resp: 18 (03/17/22 0849)  BP: (!) 123/58 (03/17/22 0841)  SpO2: 98 % (03/17/22 0617)   Vital Signs (24h Range):  Temp:  [98 °F (36.7 °C)-98.1 °F (36.7 °C)] 98.1 °F (36.7 °C)  Pulse:  [94-99] 94  Resp:  [18-20] 18  SpO2:  [98 %-100 %] 98 %  BP: ()/(50-59) 123/58     Weight: (!) 144.9 kg (319 lb 6.4 oz)  Body mass index is 50.03 kg/m².    Physical Exam  Constitutional:       General: She is awake. She is not in acute distress.     Appearance: Normal appearance. She is ill-appearing. She is not toxic-appearing or diaphoretic.   HENT:      Head: Normocephalic and atraumatic.      Nose: Nose normal. No congestion or rhinorrhea.      Mouth/Throat:      Mouth: Mucous membranes are moist.      Pharynx: Oropharynx is clear. No oropharyngeal exudate or posterior oropharyngeal erythema.   Eyes:      General: No scleral icterus.        Right eye: No discharge.          Left eye: No discharge.      Extraocular Movements: Extraocular movements intact.      Pupils: Pupils are equal, round, and reactive to light.   Neck:      Thyroid: No thyroid mass or thyromegaly.      Vascular: No carotid bruit.      Meningeal: Brudzinski's sign and Kernig's sign absent.   Cardiovascular:      Rate and Rhythm: Regular rhythm. Tachycardia present.      Chest Wall: PMI is not displaced. No thrill.      Pulses: Normal pulses.      Heart sounds: Normal heart sounds. No murmur heard.    No friction rub. No gallop.   Pulmonary:      Effort: Pulmonary effort is normal. No tachypnea, accessory muscle usage, prolonged expiration or respiratory distress.      Breath sounds: Normal breath sounds. No stridor or decreased air movement. No wheezing, rhonchi or rales.   Chest:      Chest wall: No tenderness.   Abdominal:      General: Bowel sounds are normal. There is no distension.      Palpations: Abdomen is soft. There is no hepatomegaly, splenomegaly or mass.      Tenderness: There is no abdominal tenderness. There is no right CVA tenderness, left CVA tenderness, guarding or rebound.      Hernia: No hernia is present.   Musculoskeletal:         General: No swelling, tenderness, deformity or signs of injury. Normal range of motion.      Cervical back: Normal range of motion and neck supple. No rigidity. No muscular tenderness.      Right lower leg: No edema.      Left lower leg: No edema.   Lymphadenopathy:      Cervical: No cervical adenopathy.   Skin:     General: Skin is warm.      Capillary Refill: Capillary refill takes less than 2 seconds.      Coloration: Skin is not cyanotic, jaundiced or pale.      Findings: No bruising, erythema, lesion, petechiae or rash.   Neurological:      Mental Status: She is alert and oriented to person, place, and time.      Cranial Nerves: No cranial nerve deficit, dysarthria or facial asymmetry.      Sensory: Sensory deficit present.      Motor: No tremor.      Gait: Gait  abnormal.   Psychiatric:         Mood and Affect: Mood normal. Mood is not anxious or depressed. Affect is not flat.         Speech: Speech is not rapid and pressured or slurred.         Behavior: Behavior normal. Behavior is not agitated, aggressive or combative.         Thought Content: Thought content normal. Thought content is not paranoid or delusional.         Cognition and Memory: Cognition is not impaired. Memory is not impaired.         Judgment: Judgment normal.         CRANIAL NERVES     CN III, IV, VI   Pupils are equal, round, and reactive to light.     Significant Labs: All pertinent labs within the past 24 hours have been reviewed.  Recent Lab Results         03/17/22  0713        Anion Gap 5       Baso # 0.01       Basophil % 1.0       BUN 12       Calcium 8.0       Chloride 111       CO2 27       Creatinine 0.4       Differential Method Automated       eGFR if  >60.0       eGFR if non  >60.0  Comment: Calculation used to obtain the estimated glomerular filtration  rate (eGFR) is the CKD-EPI equation.          Eos # 0.1       Eosinophil % 4.8       Glucose 80       Gran # (ANC) 0.6       Gran % 56.1       Hematocrit 24.6       Hemoglobin 7.7       Immature Grans (Abs) 0.00  Comment: Mild elevation in immature granulocytes is non specific and   can be seen in a variety of conditions including stress response,   acute inflammation, trauma and pregnancy. Correlation with other   laboratory and clinical findings is essential.         Immature Granulocytes 0.0       Lymph # 0.3       Lymph % 25.7       Magnesium 1.7       MCH 28.6       MCHC 31.3       MCV 91       Mono # 0.1       Mono % 12.4       MPV 9.1       nRBC 0       Phosphorus 3.6       Platelets 42       Potassium 3.7       RBC 2.69       RDW 16.1       Sodium 143       WBC 1.05  Comment: WBC critical result(s) called and verbal readback obtained from   Beti Alberts) by SB3 03/17/2022 09:21                  Significant Imaging:     US Lower Extremity Veins Bilateral  Narrative: EXAMINATION:  US LOWER EXTREMITY VEINS BILATERAL    CLINICAL HISTORY:  r/o dvt;    TECHNIQUE:  Duplex and color flow Doppler and dynamic compression was performed of the bilateral lower extremity veins was performed.    COMPARISON:  Ultrasound left lower extremity 05/13/2019, ultrasound right lower extremity 10/04/2018    FINDINGS:  Right thigh veins: The common femoral, femoral, popliteal, and upper greater saphenous are patent and free of thrombus. The veins are normally compressible and have normal phasic flow and augmentation response.    Right calf veins: The visualized calf veins are patent.    Left thigh veins: The common femoral, femoral, popliteal, and upper greater saphenous are patent and free of thrombus. The veins are normally compressible and have normal phasic flow and augmentation response.    Left calf veins: The visualized calf veins are patent.    Miscellaneous: Redemonstration of prominent left groin lymph nodes.  Impression: No evidence of deep venous thrombosis in either lower extremity.    Electronically signed by resident: Jeffrey Nelson  Date:    03/09/2022  Time:    15:50    Electronically signed by: eLe Pinedo MD  Date:    03/09/2022  Time:    16:07       Assessment/Plan:      * Spondylodiscitis  Continue IPR efforts      Edema  Lasix 40 bid      Mild episode of recurrent major depressive disorder  Mood stable        Abscess in epidural space of lumbar spine  Continue antibiotics      Substance use disorder        Chronic hepatitis C with cirrhosis        Cirrhosis of liver without ascites        Pancytopenia  monitor        VTE Risk Mitigation (From admission, onward)    None          Discharge Planning   SHIRA:      Code Status: Full Code   Is the patient medically ready for discharge?:     Reason for patient still in hospital (select all that apply): Patient trending condition, Laboratory test, Treatment  and PT / OT recommendations  Discharge Plan A: Home                  María Huitron NP  Department of Hospital Medicine   [facility])

## 2022-03-17 NOTE — PT/OT/SLP PROGRESS
Occupational Therapy  Treatment    Rachel Zazueta   MRN: [id number]   Admitting Diagnosis: Spondylodiscitis    OT Date of Treatment: 03/17/22   AM Start Time: na  AM End Time: na  PM Start Time: 1315  PM End Time: 1500  Treatment Type: Individual 60 and Concurrent 45  Total Time (min): 105 min      Billable Minutes:  Self Care/Home Management 15, Therapeutic Activity 60 and Therapeutic Exercise 30  Total Minutes: 105    General Precautions: Standard, fall  Orthopedic Precautions: spinal precautions  Braces:      Spiritual, Cultural Beliefs, Religion Practices, Values that Affect Care: no    Subjective:  Communicated with nurse prior to session.    Pain/Comfort  Pain Rating 1: 7/10  Location - Side 1: Bilateral  Location - Orientation 1: lower  Location 1: back  Pain Addressed 1: Reposition, Cessation of Activity, Nurse notified (Pt provided pain medication during treatment session.)  Pain Rating Post-Intervention 1: 6/10    Objective:  Pt was cooperative and motivated with verbal encouragement while exhibiting positive affect. She participated in functional transfer retraining throughout session to / from bed, chair, and toilet emphasizing fall prevention providing extra time with repetition requiring SBA secondary to safety concerns with verbal cueing for safety awareness and technique. Pt then participated in extensive therapeutic activity addressing functional reaching, gross motor coordination, static / dynamic standing balance, standing tolerance, and energy for task / endurance challenging her to reach in multiple planes utilizing bilateral UE's with emphasis on posture in order to retrieve, stabilize, manipulate, and place various light items needed to perform functional tasks of ADL's requiring verbal and tactile cueing to facilitate optimal movement patterns and proper posture while sustaining spinal precautions utilizing forward, lateral, and diagonal steps with CGA when challenged. Next, she  participated in therapeutic exercise performing 5x8-10 seated pushups requiring verbal and tactile cueing for technique challenging her to lift buttocks off seated surface to end range elbow extension in order to strengthen triceps brachii to improve performance with sit <> stand transitions particularly from lower surfaces. Also, she participated in ADL retraining regarding toileting while sustaining Spinal Precautions providing extra time requiring mod assist secondary to assist with perineal hygiene with additional cueing for safety and technique.    Functional Mobility:  Bed Mobility:   Supine to sit: Standby Assistance   Sit to supine: Standby Assistance   Rolling: Standby Assistance   Scooting: Standby Assistance    Transfer Training:   Sit to stand:Stand-by Assistance with Rolling Walker .  Bed <> Chair:  Step Transfer with Stand-by Assistance with Rolling Walker .  Toilet Transfer:  Pt Step Transfer with Stand-by Assistance with Grab bars .    Toilet Training:  Pt performed toileting with Moderate Assistance with Grab  bar at [employer].    Balance:   Static Sit: GOOD: Takes MODERATE challenges from all directions  Dynamic Sit:  GOOD-: Incosistently Maintains balance through MODERATE excursions of active trunk movement,     Static Stand: FAIR+: Takes MINIMAL challenges from all directions  Dynamic stand: FAIR+: Needs CLOSE SUPERVISION during gait and is able to right self with minor LOB    Patient left up in chair with PT present    ASSESSMENT:  Pt demonstrated improved functional performance with toileting and transfers as noted by mod assist and SBA respectively in which patient able to perform clothing management with steadying assist and cueing during clothing management while requiring no steadying assist on this date with transfers utilizing AD.       Rehab potential is good    Activity tolerance: Fair    Discharge recommendations: other (see comments) (To be further determined based on progress prior to  discharge.)     Equipment recommendations: other (see comments) (To be further determined based on progress prior to discharge.)     GOALS:   Multidisciplinary Problems     Occupational Therapy Goals        Problem: Occupational Therapy Goal    Goal Priority Disciplines Outcome Interventions   Occupational Therapy Goal     OT, PT/OT     Description: Long Term Goals to be met by: 03/29/22     Patient will increase functional independence with ADLs by performing:    Feeding with [place].  UE Dressing with Modified [place].  LE Dressing with Supervision.  Grooming while standing at sink with Modified [place].  Toileting from toilet with Supervision for hygiene and clothing management.   Bathing from  shower chair/bench with Supervision.  Step transfer with Modified [place]  Toilet transfer to toilet with Modified [place].  Increased functional strength to 4+/5 for bilateral UE's.                     Plan:  Patient to be seen 5 x/week (for 90 min per day for 14 days) to address the above listed problems via self-care/home management, community/work re-entry, therapeutic activities, therapeutic exercises, neuromuscular re-education  Plan of Care expires: 03/29/22  Plan of Care reviewed with: patient         03/17/2022

## 2022-03-17 NOTE — SUBJECTIVE & OBJECTIVE
Past Medical History:   Diagnosis Date    Anemia     Diskitis     Hep C w/o coma, chronic     IV drug abuse     Liver cirrhosis        Past Surgical History:   Procedure Laterality Date    BACK SURGERY      benine tumor removal      forehead, age 9    CHOLECYSTECTOMY      KIDNEY SURGERY      LUMBAR FUSION Bilateral 3/2/2022    Procedure: FUSION, SPINE, LUMBAR;  Surgeon: Guille Templeton MD;  Location: [facility] OR [address] [place] [address];  Service: Neurosurgery;  Laterality: Bilateral;  AIRO  T10--Pelvis    REMOVAL OF HARDWARE FROM SPINE N/A 2/21/2022    Procedure: REMOVAL, HARDWARE, SPINE;  Surgeon: Guille Templeton MD;  Location: [facility] OR [address] [place] [address];  Service: Neurosurgery;  Laterality: N/A;  [place]       Review of patient's allergies indicates:   Allergen Reactions    Bee venom protein (honey bee) Anaphylaxis     Patient reports she is allergic to bee stings.    Wasp sting [allergen ext-venom-honey bee] Anaphylaxis    Adhesive Blisters    Strawberry Hives     Patient reports itching and hives    Iodine and iodide containing products Hives    Naproxen Other (See Comments)     Throat closing    Shellfish containing products     Nuts [tree nut] Rash       No current facility-administered medications on file prior to encounter.     Current Outpatient Medications on File Prior to Encounter   Medication Sig    cefTRIAXone (ROCEPHIN) 2 g/50 mL PgBk IVPB Inject 50 mLs (2 g total) into the vein every 12 (twelve) hours.    ferrous sulfate 325 (65 FE) MG EC tablet Take 1 tablet (325 mg total) by mouth once daily.    oxyCODONE (ROXICODONE) 20 mg Tab immediate release tablet Take 1 tablet (20 mg total) by mouth every 4 (four) hours as needed for Pain.    polyethylene glycol (GLYCOLAX) 17 gram/dose powder Take 17 g by mouth once daily.    pregabalin (LYRICA) 75 MG capsule Take 1 capsule (75 mg total) by mouth 3 (three) times daily.    sertraline (ZOLOFT) 25 MG tablet Take 1 tablet (25 mg total) by mouth once daily.    [DISCONTINUED] diclofenac sodium  (VOLTAREN) 1 % Gel Apply 2 g topically 4 (four) times daily.     Family History       Problem Relation (Age of Onset)    Drug abuse Mother    Hepatitis Mother          Tobacco Use    Smoking status: Current Some Day Smoker     Packs/day: 0.50     Years: 23.00     Pack years: 11.50     Types: Cigarettes    Smokeless tobacco: Never Used    Tobacco comment: patient states she knows she nees to quit.   Substance and Sexual Activity    Alcohol use: Not Currently    Drug use: Yes     Frequency: 4.0 times per week     Types: Methamphetamines, Marijuana     Comment: Patient denies needle use, only inhalation of methampehtamines or orally.  Last known drug use was prior to admission to hospital stay for surgery    Sexual activity: Yes     Partners: Male     Birth control/protection: None     Review of Systems   Constitutional:  Positive for activity change and appetite change. Negative for chills and fever.   HENT:  Positive for dental problem. Negative for congestion, drooling, ear pain, mouth sores, nosebleeds, postnasal drip, sinus pressure, sore throat and trouble swallowing.    Eyes:  Negative for pain, itching and visual disturbance.   Respiratory:  Positive for shortness of breath. Negative for apnea, cough, chest tightness and wheezing.    Cardiovascular:  Positive for leg swelling. Negative for chest pain and palpitations.   Gastrointestinal:  Positive for constipation. Negative for abdominal distention, abdominal pain, blood in stool, diarrhea, nausea and vomiting.   Endocrine: Positive for cold intolerance and heat intolerance. Negative for polyphagia.   Genitourinary:  Negative for difficulty urinating, dysuria, flank pain, frequency, pelvic pain and vaginal bleeding.   Musculoskeletal:  Positive for back pain and gait problem. Negative for arthralgias and myalgias.   Skin:  Negative for rash.   Neurological:  Positive for weakness. Negative for dizziness, tremors, seizures, syncope, facial asymmetry, speech  difficulty and headaches.   Hematological:  Negative for adenopathy.   Psychiatric/Behavioral:  Negative for agitation, confusion and hallucinations. The patient is nervous/anxious.    Objective:     Vital Signs (Most Recent):  Temp: 98.1 °F (36.7 °C) (03/17/22 0617)  Pulse: 94 (03/17/22 0841)  Resp: 18 (03/17/22 0849)  BP: (!) 123/58 (03/17/22 0841)  SpO2: 98 % (03/17/22 0617)   Vital Signs (24h Range):  Temp:  [98 °F (36.7 °C)-98.1 °F (36.7 °C)] 98.1 °F (36.7 °C)  Pulse:  [94-99] 94  Resp:  [18-20] 18  SpO2:  [98 %-100 %] 98 %  BP: ()/(50-59) 123/58     Weight: (!) 144.9 kg (319 lb 6.4 oz)  Body mass index is 50.03 kg/m².    Physical Exam  Constitutional:       General: She is awake. She is not in acute distress.     Appearance: Normal appearance. She is ill-appearing. She is not toxic-appearing or diaphoretic.   HENT:      Head: Normocephalic and atraumatic.      Nose: Nose normal. No congestion or rhinorrhea.      Mouth/Throat:      Mouth: Mucous membranes are moist.      Pharynx: Oropharynx is clear. No oropharyngeal exudate or posterior oropharyngeal erythema.   Eyes:      General: No scleral icterus.        Right eye: No discharge.         Left eye: No discharge.      Extraocular Movements: Extraocular movements intact.      Pupils: Pupils are equal, round, and reactive to light.   Neck:      Thyroid: No thyroid mass or thyromegaly.      Vascular: No carotid bruit.      Meningeal: Brudzinski's sign and Kernig's sign absent.   Cardiovascular:      Rate and Rhythm: Regular rhythm. Tachycardia present.      Chest Wall: PMI is not displaced. No thrill.      Pulses: Normal pulses.      Heart sounds: Normal heart sounds. No murmur heard.    No friction rub. No gallop.   Pulmonary:      Effort: Pulmonary effort is normal. No tachypnea, accessory muscle usage, prolonged expiration or respiratory distress.      Breath sounds: Normal breath sounds. No stridor or decreased air movement. No wheezing, rhonchi or  rales.   Chest:      Chest wall: No tenderness.   Abdominal:      General: Bowel sounds are normal. There is no distension.      Palpations: Abdomen is soft. There is no hepatomegaly, splenomegaly or mass.      Tenderness: There is no abdominal tenderness. There is no right CVA tenderness, left CVA tenderness, guarding or rebound.      Hernia: No hernia is present.   Musculoskeletal:         General: No swelling, tenderness, deformity or signs of injury. Normal range of motion.      Cervical back: Normal range of motion and neck supple. No rigidity. No muscular tenderness.      Right lower leg: No edema.      Left lower leg: No edema.   Lymphadenopathy:      Cervical: No cervical adenopathy.   Skin:     General: Skin is warm.      Capillary Refill: Capillary refill takes less than 2 seconds.      Coloration: Skin is not cyanotic, jaundiced or pale.      Findings: No bruising, erythema, lesion, petechiae or rash.   Neurological:      Mental Status: She is alert and oriented to person, place, and time.      Cranial Nerves: No cranial nerve deficit, dysarthria or facial asymmetry.      Sensory: Sensory deficit present.      Motor: No tremor.      Gait: Gait abnormal.   Psychiatric:         Mood and Affect: Mood normal. Mood is not anxious or depressed. Affect is not flat.         Speech: Speech is not rapid and pressured or slurred.         Behavior: Behavior normal. Behavior is not agitated, aggressive or combative.         Thought Content: Thought content normal. Thought content is not paranoid or delusional.         Cognition and Memory: Cognition is not impaired. Memory is not impaired.         Judgment: Judgment normal.         CRANIAL NERVES     CN III, IV, VI   Pupils are equal, round, and reactive to light.     Significant Labs: All pertinent labs within the past 24 hours have been reviewed.  Recent Lab Results         03/17/22  0713        Anion Gap 5       Baso # 0.01       Basophil % 1.0       BUN 12        Calcium 8.0       Chloride 111       CO2 27       Creatinine 0.4       Differential Method Automated       eGFR if  >60.0       eGFR if non  >60.0  Comment: Calculation used to obtain the estimated glomerular filtration  rate (eGFR) is the CKD-EPI equation.          Eos # 0.1       Eosinophil % 4.8       Glucose 80       Gran # (ANC) 0.6       Gran % 56.1       Hematocrit 24.6       Hemoglobin 7.7       Immature Grans (Abs) 0.00  Comment: Mild elevation in immature granulocytes is non specific and   can be seen in a variety of conditions including stress response,   acute inflammation, trauma and pregnancy. Correlation with other   laboratory and clinical findings is essential.         Immature Granulocytes 0.0       Lymph # 0.3       Lymph % 25.7       Magnesium 1.7       MCH 28.6       MCHC 31.3       MCV 91       Mono # 0.1       Mono % 12.4       MPV 9.1       nRBC 0       Phosphorus 3.6       Platelets 42       Potassium 3.7       RBC 2.69       RDW 16.1       Sodium 143       WBC 1.05  Comment: WBC critical result(s) called and verbal readback obtained from   Beti Alberts) by GURMEET 03/17/2022 09:21                 Significant Imaging:     US Lower Extremity Veins Bilateral  Narrative: EXAMINATION:  US LOWER EXTREMITY VEINS BILATERAL    CLINICAL HISTORY:  r/o dvt;    TECHNIQUE:  Duplex and color flow Doppler and dynamic compression was performed of the bilateral lower extremity veins was performed.    COMPARISON:  Ultrasound left lower extremity 05/13/2019, ultrasound right lower extremity 10/04/2018    FINDINGS:  Right thigh veins: The common femoral, femoral, popliteal, and upper greater saphenous are patent and free of thrombus. The veins are normally compressible and have normal phasic flow and augmentation response.    Right calf veins: The visualized calf veins are patent.    Left thigh veins: The common femoral, femoral, popliteal, and upper greater saphenous are  patent and free of thrombus. The veins are normally compressible and have normal phasic flow and augmentation response.    Left calf veins: The visualized calf veins are patent.    Miscellaneous: Redemonstration of prominent left groin lymph nodes.  Impression: No evidence of deep venous thrombosis in either lower extremity.    Electronically signed by resident: Jeffrey Nelson  Date:    03/09/2022  Time:    15:50    Electronically signed by: Lee Pinedo MD  Date:    03/09/2022  Time:    16:07

## 2022-03-17 NOTE — PT/OT/SLP PROGRESS
Physical Therapy         Treatment        Rachel Zazueta   MRN: [id number]     PT Received On: 03/17/22  Total Time (min): 90        Billable Minutes:  Gait [id number], Therapeutic Activity 30 and Therapeutic Exercise 30  Total Minutes: 90  AM Start Time: 1130  AM End Time: 1215  PM Start Time:   PM End Time:   Treatment Type: Individual 60 and Concurrent 30  Treatment Type: Treatment  PT/PTA: PT             General Precautions: Standard,  (back brace on when out of bed)  Orthopedic Precautions:     Braces:           Subjective:  Communicated with  nurse prior to session.         Objective:  Patient found in , with      Functional Mobility:  Bed Mobility:   Supine to sit: Minimal Assistance   Sit to supine: Standby Assistance   Rolling: Standby Assistance   Scooting: Standby Assistance    Balance:   Static Sit: GOOD: Takes MODERATE challenges from all directions  Dynamic Sit:  GOOD: Maintains balance through MODERATE excursions of active trunk movement  Static Stand: FAIR+: Takes MINIMAL challenges from all directions  Dynamic stand: FAIR+: Needs CLOSE SUPERVISION during gait and is able to right self with minor LOB    Transfer Training:  Sit to stand:Stand-by Assistance with Rolling Walker    Bed <> Chair:  Step Transfer with Stand-by Assistance with Rolling Walker      Wheelchair Training:   mobility 150ft with supervision    Gait Training:  Pt ambulated 150ft 4 times with a rw sba    Stair Training:  Up and down 8 steps with rails cga      Additional Treatment:   pt performed it to stand 3 sets 10 reps with no use of hands . She also performed 4 sets 20 reps BLE in bed supine with 2lb ankle wts on legs. Pt performed standing ble marching in place with and without support  And shallow knee bends 3 sets 10 reps    Activity Tolerance:  Patient tolerated treatment well    Patient left supine with call button in reach.    Assessment:  Rachel Zazueta is a 41 y.o. female with a medical diagnosis of  Spondylodiscitis. She presents with better gait distance and better transfers.    Rehab potential is good.    Activity tolerance: Good    Discharge recommendations: Discharge Facility/Level of Care Needs: home health PT     Equipment recommendations: Equipment Needed After Discharge: shower chair     GOALS:   Multidisciplinary Problems     Physical Therapy Goals     Not on file                PLAN:    Patient to be seen 5 x/week  to address the above listed problems via therapeutic exercises, therapeutic activities, gait training, therapeutic groups, neuromuscular re-education, wheelchair management/training  Plan of Care expires: 03/29/22  Plan of Care reviewed with: patient         3/17/2022

## 2022-03-17 NOTE — PLAN OF CARE
Problem: Adult Inpatient Plan of Care  Goal: Plan of Care Review  Outcome: Ongoing, Progressing  Goal: Patient-Specific Goal (Individualized)  Outcome: Ongoing, Progressing     Problem: Adult Inpatient Plan of Care  Goal: Patient-Specific Goal (Individualized)  Outcome: Ongoing, Progressing     Problem: Infection  Goal: Absence of Infection Signs and Symptoms  Outcome: Ongoing, Progressing     Problem: Fall Injury Risk  Goal: Absence of Fall and Fall-Related Injury  Outcome: Ongoing, Progressing

## 2022-03-17 NOTE — HOSPITAL COURSE
3/17/22 [facility] patient with complaints of leg swelling with add lasix today and discontinue lidocaine patch due to refusal  3/18/22 [facility] Patient up and ambulating to the gym. Good improvement with lasix no complaints    3/19 [facility], weekend crosscover, vital signs reviewed, previous labs reviewed, chronic leukopenia, continue PT OT efforts   3/20 [facility], weekend crosscover, vitals reviewed, called by nurse, patient had wanted to leave AMA, discussed with patient risks involved with leaving and stopping abx therapy, patient agreed to stay, will discuss with case management in AM about possible home infusion for abx and outpatient therapy  3/21/22 [facility] patient is calm and cooperative, no acute events overnight    Discharge Note:  Patient requesting dc home early, we encouraged to stay, encouraged to continue healthy living and avoid all illicit substances, please keep fu with setup appts.  Patient did excellent with her rehabilitative efforts and I am hoping she continues efforts.

## 2022-03-18 PROCEDURE — 97535 SELF CARE MNGMENT TRAINING: CPT

## 2022-03-18 PROCEDURE — 25000003 PHARM REV CODE 250: Performed by: PHYSICIAN ASSISTANT

## 2022-03-18 PROCEDURE — 63600175 PHARM REV CODE 636 W HCPCS: Performed by: NURSE PRACTITIONER

## 2022-03-18 PROCEDURE — 25000003 PHARM REV CODE 250: Performed by: INTERNAL MEDICINE

## 2022-03-18 PROCEDURE — 63600175 PHARM REV CODE 636 W HCPCS: Performed by: PHYSICIAN ASSISTANT

## 2022-03-18 PROCEDURE — 11000001 HC ACUTE MED/SURG PRIVATE ROOM

## 2022-03-18 PROCEDURE — 97530 THERAPEUTIC ACTIVITIES: CPT

## 2022-03-18 PROCEDURE — 97110 THERAPEUTIC EXERCISES: CPT

## 2022-03-18 PROCEDURE — A4216 STERILE WATER/SALINE, 10 ML: HCPCS | Performed by: INTERNAL MEDICINE

## 2022-03-18 PROCEDURE — 97116 GAIT TRAINING THERAPY: CPT

## 2022-03-18 RX ADMIN — Medication 6 MG: at 10:03

## 2022-03-18 RX ADMIN — OXYCODONE HYDROCHLORIDE 20 MG: 5 TABLET ORAL at 10:03

## 2022-03-18 RX ADMIN — PANTOPRAZOLE SODIUM 40 MG: 40 TABLET, DELAYED RELEASE ORAL at 08:03

## 2022-03-18 RX ADMIN — DOCUSATE SODIUM 50MG AND SENNOSIDES 8.6MG 1 TABLET: 8.6; 5 TABLET, FILM COATED ORAL at 09:03

## 2022-03-18 RX ADMIN — MICONAZOLE NITRATE: 20 POWDER TOPICAL at 09:03

## 2022-03-18 RX ADMIN — FERROUS SULFATE TAB 325 MG (65 MG ELEMENTAL FE) 1 EACH: 325 (65 FE) TAB at 08:03

## 2022-03-18 RX ADMIN — OXYCODONE HYDROCHLORIDE 20 MG: 5 TABLET ORAL at 06:03

## 2022-03-18 RX ADMIN — MICONAZOLE NITRATE: 20 POWDER TOPICAL at 08:03

## 2022-03-18 RX ADMIN — Medication 10 ML: at 09:03

## 2022-03-18 RX ADMIN — SERTRALINE HYDROCHLORIDE 50 MG: 50 TABLET ORAL at 08:03

## 2022-03-18 RX ADMIN — PREGABALIN 75 MG: 75 CAPSULE ORAL at 08:03

## 2022-03-18 RX ADMIN — FUROSEMIDE 40 MG: 10 INJECTION, SOLUTION INTRAMUSCULAR; INTRAVENOUS at 09:03

## 2022-03-18 RX ADMIN — FUROSEMIDE 40 MG: 10 INJECTION, SOLUTION INTRAMUSCULAR; INTRAVENOUS at 08:03

## 2022-03-18 RX ADMIN — CEFTRIAXONE 2 G: 2 INJECTION, SOLUTION INTRAVENOUS at 03:03

## 2022-03-18 RX ADMIN — MUPIROCIN: 20 OINTMENT TOPICAL at 09:03

## 2022-03-18 RX ADMIN — PREGABALIN 75 MG: 75 CAPSULE ORAL at 09:03

## 2022-03-18 RX ADMIN — CEFTRIAXONE 2 G: 2 INJECTION, SOLUTION INTRAVENOUS at 02:03

## 2022-03-18 RX ADMIN — Medication 10 ML: at 08:03

## 2022-03-18 RX ADMIN — PREGABALIN 75 MG: 75 CAPSULE ORAL at 02:03

## 2022-03-18 NOTE — PROGRESS NOTES
[facility] Medicine  Progress Note    Patient Name: Rachel Zazueta  MRN: [id number]  Patient Class: - Rehab   Admission Date: 3/15/2022  Length of Stay: 3 days  Attending Physician: Sai Red III, MD  Primary Care Provider: Dannielle Merino DO        Subjective:     Principal Problem:Spondylodiscitis        HPI:      Patient is a 41-year-old female with a past medical history of hepatitis C cirrhosis of liver anemia and substance abuse.  Patient presented to the clinic in early February for follow-up of a L3 and L4 laminectomy with evacuation of epidural abscess and L3-L5 TLIF and April of 2021.  The patient had worsening weakness and pain and her neurosurgeons diagnosed her with hardware failure and new degeneration of the lower spine.  Patient reportedly had quit IV drug use which had contributed to her original back surgery and epidural abscess but did admit to continuing to use nicotine marijuana and methamphetamine at times.  Patient has severe back pain was unable to ambulate she was having weakness in both legs and was admitted to the acute care facility at the time of the office visit.  Patient's diagnostic testing showed likely infected hardware with failure and she was planned to have a cleanout with fusion.  She was also found to have a kidney stone around the same time had lithotripsy done as well as several ureteral stents that the patient states had become infected and caused complications.  After her lumbar surgery the patient was started on empiric vancomycin and ceftriaxone and was seen by infectious disease.  Psychiatry saw the patient and made several recommendations with the primary being avoid illicit drugs.  Patient's original postop course was slow because of pain psychiatric complications and ultimately she required blood transfusions and several revisions.  Patient has had difficulty with thrombocytopenia recurrent anemia all suspected related to her  chronic liver disease and hepatitis C. The patient was placed on antibiotic regimen for the next 30 days and we will review her chart at the outside facility for permissive cultures.  I have evaluated the patient this morning while doing therapy she seems very motivated and eager to improve.  She has a brace on her lower back and is only complaining of intermittent pain but feels she is getting stronger very rapidly.  The patient did well on the rehab unit sometime last year after a lumbar surgery.  We have encouraged her to avoid illicit substances and drugs and have motivated her.        Overview/Hospital Course:  3/17/22 [facility] patient with complaints of leg swelling with add lasix today and discontinue lidocaine patch due to refusal    3/18/22 [facility] Patient up and ambulating to the gym. 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9    CHOLECYSTECTOMY      KIDNEY SURGERY      LUMBAR FUSION Bilateral 3/2/2022    Procedure: FUSION, SPINE, LUMBAR;  Surgeon: Guille Templeton MD;  Location: [facility] OR [address] [place] [address];  Service: Neurosurgery;  Laterality: Bilateral;  AIRO  T10--Pelvis    REMOVAL OF HARDWARE FROM SPINE N/A 2/21/2022    Procedure: REMOVAL, HARDWARE, SPINE;  Surgeon: Guille Templeton MD;  Location: [facility] OR [address] [place] [address];  Service: Neurosurgery;  Laterality: N/A;  [place]       Review of patient's allergies indicates:   Allergen Reactions    Bee venom protein (honey bee) Anaphylaxis     Patient reports she is allergic to bee stings.    Wasp sting [allergen ext-venom-honey bee] Anaphylaxis    Adhesive Blisters    Strawberry Hives     Patient reports itching and hives    Iodine and iodide containing products Hives    Naproxen Other (See Comments)     Throat closing     Shellfish containing products     Nuts [tree nut] Rash       No current facility-administered medications on file prior to encounter.     Current Outpatient Medications on File Prior to Encounter   Medication Sig    cefTRIAXone (ROCEPHIN) 2 g/50 mL PgBk IVPB Inject 50 mLs (2 g total) into the vein every 12 (twelve) hours.    ferrous sulfate 325 (65 FE) MG EC tablet Take 1 tablet (325 mg total) by mouth once daily.    oxyCODONE (ROXICODONE) 20 mg Tab immediate release tablet Take 1 tablet (20 mg total) by mouth every 4 (four) hours as needed for Pain.    polyethylene glycol (GLYCOLAX) 17 gram/dose powder Take 17 g by mouth once daily.    pregabalin (LYRICA) 75 MG capsule Take 1 capsule (75 mg total) by mouth 3 (three) times daily.    sertraline (ZOLOFT) 25 MG tablet Take 1 tablet (25 mg total) by mouth once daily.    [DISCONTINUED] diclofenac sodium (VOLTAREN) 1 % Gel Apply 2 g topically 4 (four) times daily.     Family History       Problem Relation (Age of Onset)    Drug abuse Mother    Hepatitis Mother          Tobacco Use    Smoking status: Current Some Day Smoker     Packs/day: 0.50     Years: 23.00     Pack years: 11.50     Types: Cigarettes    Smokeless tobacco: Never Used    Tobacco comment: patient states she knows she nees to quit.   Substance and Sexual Activity    Alcohol use: Not Currently    Drug use: Yes     Frequency: 4.0 times per week     Types: Methamphetamines, Marijuana     Comment: Patient denies needle use, only inhalation of methampehtamines or orally.  Last known drug use was prior to admission to hospital stay for surgery    Sexual activity: Yes     Partners: Male     Birth control/protection: None     Review of Systems   Constitutional:  Positive for activity change and appetite change. Negative for chills and fever.   HENT:  Positive for dental problem. Negative for congestion, drooling, ear pain, mouth sores, nosebleeds, postnasal drip, sinus pressure, sore throat and  trouble swallowing.    Eyes:  Negative for pain, itching and visual disturbance.   Respiratory:  Positive for shortness of breath. Negative for apnea, cough, chest tightness and wheezing.    Cardiovascular:  Positive for leg swelling. Negative for chest pain and palpitations.   Gastrointestinal:  Positive for constipation. Negative for abdominal distention, abdominal pain, blood in stool, diarrhea, nausea and vomiting.   Endocrine: Positive for cold intolerance and heat intolerance. Negative for polyphagia.   Genitourinary:  Negative for difficulty urinating, dysuria, flank pain, frequency, pelvic pain and vaginal bleeding.   Musculoskeletal:  Positive for back pain and gait problem. Negative for arthralgias and myalgias.   Skin:  Negative for rash.   Neurological:  Positive for weakness. Negative for dizziness, tremors, seizures, syncope, facial asymmetry, speech difficulty and headaches.   Hematological:  Negative for adenopathy.   Psychiatric/Behavioral:  Negative for agitation, confusion and hallucinations. The patient is nervous/anxious.    Objective:     Vital Signs (Most Recent):  Temp: 98.1 °F (36.7 °C) (03/18/22 0610)  Pulse: 95 (03/18/22 0610)  Resp: 18 (03/18/22 0624)  BP: (!) 111/56 (03/18/22 0610)  SpO2: 100 % (03/18/22 0610)   Vital Signs (24h Range):  Temp:  [98.1 °F (36.7 °C)-98.3 °F (36.8 °C)] 98.1 °F (36.7 °C)  Pulse:  [90-95] 95  Resp:  [18-20] 18  SpO2:  [100 %] 100 %  BP: (102-118)/(55-72) 111/56     Weight: (!) 144.9 kg (319 lb 6.4 oz)  Body mass index is 50.03 kg/m².    Physical Exam  Constitutional:       General: She is awake. She is not in acute distress.     Appearance: Normal appearance. She is ill-appearing. She is not toxic-appearing or diaphoretic.   HENT:      Head: Normocephalic and atraumatic.      Nose: Nose normal. No congestion or rhinorrhea.      Mouth/Throat:      Mouth: Mucous membranes are moist.      Pharynx: Oropharynx is clear. No oropharyngeal exudate or posterior  oropharyngeal erythema.   Eyes:      General: No scleral icterus.        Right eye: No discharge.         Left eye: No discharge.      Extraocular Movements: Extraocular movements intact.      Pupils: Pupils are equal, round, and reactive to light.   Neck:      Thyroid: No thyroid mass or thyromegaly.      Vascular: No carotid bruit.      Meningeal: Brudzinski's sign and Kernig's sign absent.   Cardiovascular:      Rate and Rhythm: Normal rate and regular rhythm.      Chest Wall: PMI is not displaced. No thrill.      Pulses: Normal pulses.      Heart sounds: Normal heart sounds. No murmur heard.    No friction rub. No gallop.   Pulmonary:      Effort: Pulmonary effort is normal. No tachypnea, accessory muscle usage, prolonged expiration or respiratory distress.      Breath sounds: Normal breath sounds. No stridor or decreased air movement. No wheezing, rhonchi or rales.   Chest:      Chest wall: No tenderness.   Abdominal:      General: Bowel sounds are normal. There is no distension.      Palpations: Abdomen is soft. There is no hepatomegaly, splenomegaly or mass.      Tenderness: There is no abdominal tenderness. There is no right CVA tenderness, left CVA tenderness, guarding or rebound.      Hernia: No hernia is present.   Musculoskeletal:         General: No swelling, tenderness, deformity or signs of injury. Normal range of motion.      Cervical back: Normal range of motion and neck supple. No rigidity. No muscular tenderness.      Right lower leg: No edema.      Left lower leg: No edema.   Lymphadenopathy:      Cervical: No cervical adenopathy.   Skin:     General: Skin is warm.      Capillary Refill: Capillary refill takes less than 2 seconds.      Coloration: Skin is not cyanotic, jaundiced or pale.      Findings: No bruising, erythema, lesion, petechiae or rash.   Neurological:      Mental Status: She is alert and oriented to person, place, and time.      Cranial Nerves: No cranial nerve deficit, dysarthria  or facial asymmetry.      Sensory: Sensory deficit present.      Motor: No tremor.      Gait: Gait abnormal.   Psychiatric:         Mood and Affect: Mood normal. Mood is not anxious or depressed. Affect is not flat.         Speech: Speech is not rapid and pressured or slurred.         Behavior: Behavior normal. Behavior is not agitated, aggressive or combative.         Thought Content: Thought content normal. Thought content is not paranoid or delusional.         Cognition and Memory: Cognition is not impaired. Memory is not impaired.         Judgment: Judgment normal.         CRANIAL NERVES     CN III, IV, VI   Pupils are equal, round, and reactive to light.     Significant Labs: All pertinent labs within the past 24 hours have been reviewed.  Recent Lab Results       None            Significant Imaging:     US Lower Extremity Veins Bilateral  Narrative: EXAMINATION:  US LOWER EXTREMITY VEINS BILATERAL    CLINICAL HISTORY:  r/o dvt;    TECHNIQUE:  Duplex and color flow Doppler and dynamic compression was performed of the bilateral lower extremity veins was performed.    COMPARISON:  Ultrasound left lower extremity 05/13/2019, ultrasound right lower extremity 10/04/2018    FINDINGS:  Right thigh veins: The common femoral, femoral, popliteal, and upper greater saphenous are patent and free of thrombus. The veins are normally compressible and have normal phasic flow and augmentation response.    Right calf veins: The visualized calf veins are patent.    Left thigh veins: The common femoral, femoral, popliteal, and upper greater saphenous are patent and free of thrombus. The veins are normally compressible and have normal phasic flow and augmentation response.    Left calf veins: The visualized calf veins are patent.    Miscellaneous: Redemonstration of prominent left groin lymph nodes.  Impression: No evidence of deep venous thrombosis in either lower extremity.    Electronically signed by resident: Jeffrey  Leslie  Date:    03/09/2022  Time:    15:50    Electronically signed by: Lee Pinedo MD  Date:    03/09/2022  Time:    16:07       Assessment/Plan:      * Spondylodiscitis  Continue IPR efforts      Edema  Lasix 40 bid    3/18/22 [facility] good diureses with lasix      Mild episode of recurrent major depressive disorder  Mood stable        Abscess in epidural space of lumbar spine  Continue antibiotics    3/18/22 [facility] please take picture of surgical incision today      Substance use disorder        Chronic hepatitis C with cirrhosis        Cirrhosis of liver without ascites        Pancytopenia  monitor        VTE Risk Mitigation (From admission, onward)    None          Discharge Planning   SHIRA:      Code Status: Full Code   Is the patient medically ready for discharge?:     Reason for patient still in hospital (select all that apply): Patient trending condition, Laboratory test, Treatment and PT / OT recommendations  Discharge Plan A: Home                  María Huitron NP  Department of Hospital Medicine   [facility] ([facility])

## 2022-03-18 NOTE — CONSULTS
"  [facility])  Adult Nutrition  Consult Note    SUMMARY     Recommendations    Recommendation/Intervention:   1. Continue current diet order: Regular     2. Education on healthy diet for weight loss and liver disease     3. RD to monitor and make recs accordingly.    Goals: 75% intake of meals  Nutrition Goal Status: new    Assessment and Plan    Nutrition Problem  Obesity     Related to (etiology):   Excessive calorie intake/poor dietary choices     Signs and Symptoms (as evidenced by):   BMI 49.96     Interventions/Recommendations (treatment strategy):  1. Continue current diet order: Regular     2. Education on healthy diet for weight loss and liver disease     3. RD to monitor and make recs accordingly.    Nutrition Diagnosis Status:   New      Reason for Assessment    Reason For Assessment: consult  Diagnosis: other (see comments) (Spondylodiscitis)  Relevant Medical History: liver cirrhosis, Hep C, IV drug abuse  General Information Comments: RD consulted for new rehab admission. PO @ 100%. Food preferences obtained. Last April during her previous rehab admission she weighed 290lb. She has since gained some weight. Encouraged healthier eating and educated her on diet for liver disease. Noticed 2 containers of cupcakes at bedside. She is aware she should reduce sugars/processed food intake. She states moving forward she will eat more salads and more vegetables.  Nutrition Discharge Planning: Regular    Nutrition Risk Screen    Nutrition Risk Screen: no indicators present    Nutrition/Diet History    Patient Reported Diet/Restrictions/Preferences: general  Spiritual, Cultural Beliefs, Yazidism Practices, Values that Affect Care: no  Food Allergies: NKFA  Factors Affecting Nutritional Intake: None identified at this time    Anthropometrics    Temp: 98.1 °F (36.7 °C)  Height: 5' 7" (170.2 cm)  Height (inches): 67 in  Weight Method: Standard Scale  Weight: (!) 144.7 kg (319 lb)  Weight (lb): (!) " 319 lb  Ideal Body Weight (IBW), Female: 135 lb  % Ideal Body Weight, Female (lb): 236.3 %  BMI (Calculated): 50  BMI Grade: greater than 40 - morbid obesity       Lab/Procedures/Meds    Pertinent Labs Reviewed: reviewed  Pertinent Medications Reviewed: reviewed  Pertinent Medications Comments: Rocephin, Protonix    Physical Findings/Assessment         Estimated/Assessed Needs    Weight Used For Calorie Calculations: 95 kg (209 lb 7 oz)  Energy Calorie Requirements (kcal): 9286-4029 kcal (20-25 kcal/kg AJ IBW)  Energy Need Method: Kcal/kg  Protein Requirements:  (1-1.2 GM/KG AJ IBW)  Weight Used For Protein Calculations: 95 kg (209 lb 7 oz)     Estimated Fluid Requirement Method: RDA Method  RDA Method (mL): 1900         Nutrition Prescription Ordered    Current Diet Order: Regular    Evaluation of Received Nutrient/Fluid Intake       % Intake of Estimated Energy Needs: 75 - 100 %  % Meal Intake: 75 - 100 %    Nutrition Risk    Level of Risk/Frequency of Follow-up: low       Monitor and Evaluation    Food and Nutrient Intake: food and beverage intake  Food and Nutrient Adminstration: diet order  Knowledge/Beliefs/Attitudes: food and nutrition knowledge/skill  Anthropometric Measurements: weight, weight change  Biochemical Data, Medical Tests and Procedures: gastrointestinal profile, electrolyte and renal panel, glucose/endocrine profile, inflammatory profile, lipid profile  Nutrition-Focused Physical Findings: overall appearance       Nutrition Follow-Up    RD Follow-up?: Yes

## 2022-03-18 NOTE — SUBJECTIVE & OBJECTIVE
Past Medical History:   Diagnosis Date    Anemia     Diskitis     Hep C w/o coma, chronic     IV drug abuse     Liver cirrhosis        Past Surgical History:   Procedure Laterality Date    BACK SURGERY      benine tumor removal      forehead, age 9    CHOLECYSTECTOMY      KIDNEY SURGERY      LUMBAR FUSION Bilateral 3/2/2022    Procedure: FUSION, SPINE, LUMBAR;  Surgeon: Guille Templeton MD;  Location: [facility] OR [address] [place] [address];  Service: Neurosurgery;  Laterality: Bilateral;  AIRO  T10--Pelvis    REMOVAL OF HARDWARE FROM SPINE N/A 2/21/2022    Procedure: REMOVAL, HARDWARE, SPINE;  Surgeon: Guille Templeton MD;  Location: [facility] OR [address] [place] [address];  Service: Neurosurgery;  Laterality: N/A;  [place]       Review of patient's allergies indicates:   Allergen Reactions    Bee venom protein (honey bee) Anaphylaxis     Patient reports she is allergic to bee stings.    Wasp sting [allergen ext-venom-honey bee] Anaphylaxis    Adhesive Blisters    Strawberry Hives     Patient reports itching and hives    Iodine and iodide containing products Hives    Naproxen Other (See Comments)     Throat closing    Shellfish containing products     Nuts [tree nut] Rash       No current facility-administered medications on file prior to encounter.     Current Outpatient Medications on File Prior to Encounter   Medication Sig    cefTRIAXone (ROCEPHIN) 2 g/50 mL PgBk IVPB Inject 50 mLs (2 g total) into the vein every 12 (twelve) hours.    ferrous sulfate 325 (65 FE) MG EC tablet Take 1 tablet (325 mg total) by mouth once daily.    oxyCODONE (ROXICODONE) 20 mg Tab immediate release tablet Take 1 tablet (20 mg total) by mouth every 4 (four) hours as needed for Pain.    polyethylene glycol (GLYCOLAX) 17 gram/dose powder Take 17 g by mouth once daily.    pregabalin (LYRICA) 75 MG capsule Take 1 capsule (75 mg total) by mouth 3 (three) times daily.    sertraline (ZOLOFT) 25 MG tablet Take 1 tablet (25 mg total) by mouth once daily.    [DISCONTINUED] diclofenac sodium  (VOLTAREN) 1 % Gel Apply 2 g topically 4 (four) times daily.     Family History       Problem Relation (Age of Onset)    Drug abuse Mother    Hepatitis Mother          Tobacco Use    Smoking status: Current Some Day Smoker     Packs/day: 0.50     Years: 23.00     Pack years: 11.50     Types: Cigarettes    Smokeless tobacco: Never Used    Tobacco comment: patient states she knows she nees to quit.   Substance and Sexual Activity    Alcohol use: Not Currently    Drug use: Yes     Frequency: 4.0 times per week     Types: Methamphetamines, Marijuana     Comment: Patient denies needle use, only inhalation of methampehtamines or orally.  Last known drug use was prior to admission to hospital stay for surgery    Sexual activity: Yes     Partners: Male     Birth control/protection: None     Review of Systems   Constitutional:  Positive for activity change and appetite change. Negative for chills and fever.   HENT:  Positive for dental problem. Negative for congestion, drooling, ear pain, mouth sores, nosebleeds, postnasal drip, sinus pressure, sore throat and trouble swallowing.    Eyes:  Negative for pain, itching and visual disturbance.   Respiratory:  Positive for shortness of breath. Negative for apnea, cough, chest tightness and wheezing.    Cardiovascular:  Positive for leg swelling. Negative for chest pain and palpitations.   Gastrointestinal:  Positive for constipation. Negative for abdominal distention, abdominal pain, blood in stool, diarrhea, nausea and vomiting.   Endocrine: Positive for cold intolerance and heat intolerance. Negative for polyphagia.   Genitourinary:  Negative for difficulty urinating, dysuria, flank pain, frequency, pelvic pain and vaginal bleeding.   Musculoskeletal:  Positive for back pain and gait problem. Negative for arthralgias and myalgias.   Skin:  Negative for rash.   Neurological:  Positive for weakness. Negative for dizziness, tremors, seizures, syncope, facial asymmetry, speech  difficulty and headaches.   Hematological:  Negative for adenopathy.   Psychiatric/Behavioral:  Negative for agitation, confusion and hallucinations. The patient is nervous/anxious.    Objective:     Vital Signs (Most Recent):  Temp: 98.1 °F (36.7 °C) (03/18/22 0610)  Pulse: 95 (03/18/22 0610)  Resp: 18 (03/18/22 0624)  BP: (!) 111/56 (03/18/22 0610)  SpO2: 100 % (03/18/22 0610)   Vital Signs (24h Range):  Temp:  [98.1 °F (36.7 °C)-98.3 °F (36.8 °C)] 98.1 °F (36.7 °C)  Pulse:  [90-95] 95  Resp:  [18-20] 18  SpO2:  [100 %] 100 %  BP: (102-118)/(55-72) 111/56     Weight: (!) 144.9 kg (319 lb 6.4 oz)  Body mass index is 50.03 kg/m².    Physical Exam  Constitutional:       General: She is awake. She is not in acute distress.     Appearance: Normal appearance. She is ill-appearing. She is not toxic-appearing or diaphoretic.   HENT:      Head: Normocephalic and atraumatic.      Nose: Nose normal. No congestion or rhinorrhea.      Mouth/Throat:      Mouth: Mucous membranes are moist.      Pharynx: Oropharynx is clear. No oropharyngeal exudate or posterior oropharyngeal erythema.   Eyes:      General: No scleral icterus.        Right eye: No discharge.         Left eye: No discharge.      Extraocular Movements: Extraocular movements intact.      Pupils: Pupils are equal, round, and reactive to light.   Neck:      Thyroid: No thyroid mass or thyromegaly.      Vascular: No carotid bruit.      Meningeal: Brudzinski's sign and Kernig's sign absent.   Cardiovascular:      Rate and Rhythm: Normal rate and regular rhythm.      Chest Wall: PMI is not displaced. No thrill.      Pulses: Normal pulses.      Heart sounds: Normal heart sounds. No murmur heard.    No friction rub. No gallop.   Pulmonary:      Effort: Pulmonary effort is normal. No tachypnea, accessory muscle usage, prolonged expiration or respiratory distress.      Breath sounds: Normal breath sounds. No stridor or decreased air movement. No wheezing, rhonchi or rales.    Chest:      Chest wall: No tenderness.   Abdominal:      General: Bowel sounds are normal. There is no distension.      Palpations: Abdomen is soft. There is no hepatomegaly, splenomegaly or mass.      Tenderness: There is no abdominal tenderness. There is no right CVA tenderness, left CVA tenderness, guarding or rebound.      Hernia: No hernia is present.   Musculoskeletal:         General: No swelling, tenderness, deformity or signs of injury. Normal range of motion.      Cervical back: Normal range of motion and neck supple. No rigidity. No muscular tenderness.      Right lower leg: No edema.      Left lower leg: No edema.   Lymphadenopathy:      Cervical: No cervical adenopathy.   Skin:     General: Skin is warm.      Capillary Refill: Capillary refill takes less than 2 seconds.      Coloration: Skin is not cyanotic, jaundiced or pale.      Findings: No bruising, erythema, lesion, petechiae or rash.   Neurological:      Mental Status: She is alert and oriented to person, place, and time.      Cranial Nerves: No cranial nerve deficit, dysarthria or facial asymmetry.      Sensory: Sensory deficit present.      Motor: No tremor.      Gait: Gait abnormal.   Psychiatric:         Mood and Affect: Mood normal. Mood is not anxious or depressed. Affect is not flat.         Speech: Speech is not rapid and pressured or slurred.         Behavior: Behavior normal. Behavior is not agitated, aggressive or combative.         Thought Content: Thought content normal. Thought content is not paranoid or delusional.         Cognition and Memory: Cognition is not impaired. Memory is not impaired.         Judgment: Judgment normal.         CRANIAL NERVES     CN III, IV, VI   Pupils are equal, round, and reactive to light.     Significant Labs: All pertinent labs within the past 24 hours have been reviewed.  Recent Lab Results       None            Significant Imaging:     US Lower Extremity Veins Bilateral  Narrative:  EXAMINATION:  US LOWER EXTREMITY VEINS BILATERAL    CLINICAL HISTORY:  r/o dvt;    TECHNIQUE:  Duplex and color flow Doppler and dynamic compression was performed of the bilateral lower extremity veins was performed.    COMPARISON:  Ultrasound left lower extremity 05/13/2019, ultrasound right lower extremity 10/04/2018    FINDINGS:  Right thigh veins: The common femoral, femoral, popliteal, and upper greater saphenous are patent and free of thrombus. The veins are normally compressible and have normal phasic flow and augmentation response.    Right calf veins: The visualized calf veins are patent.    Left thigh veins: The common femoral, femoral, popliteal, and upper greater saphenous are patent and free of thrombus. The veins are normally compressible and have normal phasic flow and augmentation response.    Left calf veins: The visualized calf veins are patent.    Miscellaneous: Redemonstration of prominent left groin lymph nodes.  Impression: No evidence of deep venous thrombosis in either lower extremity.    Electronically signed by resident: Jeffrey Nelson  Date:    03/09/2022  Time:    15:50    Electronically signed by: Lee Pinedo MD  Date:    03/09/2022  Time:    16:07

## 2022-03-18 NOTE — PT/OT/SLP PROGRESS
Occupational Therapy  Treatment    Rachel Zazueta   MRN: [id number]   Admitting Diagnosis: Spondylodiscitis    OT Date of Treatment: 03/18/22   AM Start Time: na  AM End Time: na  PM Start Time: 1200  PM End Time: 1330  Treatment Type: Individual 60 and Concurrent 30  Total Time (min): 90 min      Billable Minutes:  Self Care/Home Management 30, Therapeutic Activity 45 and Therapeutic Exercise 15  Total Minutes: 90    General Precautions: Standard, fall  Orthopedic Precautions: spinal precautions  Braces:      Spiritual, Cultural Beliefs, Rastafarian Practices, Values that Affect Care: no    Subjective:  Communicated with nurse prior to session.    Pain/Comfort  Pain Rating 1: 7/10  Location - Side 1: Bilateral  Location - Orientation 1: lower  Location 1: back  Pain Addressed 1: Reposition, Cessation of Activity, Nurse notified  Pain Rating Post-Intervention 1: 5/10    Objective:  Pt was cooperative and motivated with minimal verbal encouragement while exhibiting positive affect. She participated in ADL retraining regarding UB dressing emphasizing use of compensatory strategies while sustaining Spinal Precautions providing extra time requiring supervision secondary to verbal cueing for optimal technique. Pt then participated in functional transfer retraining throughout session to / from bed, chair, toilet, and bench emphasizing fall prevention providing extra time with repetition requiring supervision secondary to safety concerns with verbal cueing for safety awareness and technique. Next, she participated in therapeutic exercise performing 5x10 seated pushups requiring verbal and tactile cueing for technique challenging her to lift buttocks off seated surface to end range elbow extension in order to strengthen triceps brachii to improve performance with sit <> stand transitions particularly from lower surfaces. Pt then participated in extensive therapeutic activity addressing functional reaching, gross motor  coordination, static / dynamic standing balance, standing tolerance, and energy for task / endurance challenging her to reach in multiple planes utilizing bilateral UE's with emphasis on posture in order to retrieve, stabilize, manipulate, and place various light items needed to perform functional tasks of ADL's requiring verbal and tactile cueing to facilitate optimal movement patterns and proper posture while sustaining spinal precautions utilizing forward, lateral, and diagonal steps. Also, she participated in ADL retraining regarding toileting (urination) while sustaining Spinal Precautions providing extra time requiring SBA secondary to safety concerns with additional verbal cueing for safety and technique.    Functional Mobility:  Bed Mobility:   Supine to sit: Standby Assistance   Sit to supine: Standby Assistance   Rolling: Standby Assistance   Scooting: Standby Assistance    Transfer Training:   Sit to stand:Stand-by Assistance with Rolling Walker .  Bed <> Chair:  Step Transfer with Stand-by Assistance with Rolling Walker .  Toilet Transfer:  Pt Step Transfer with Stand-by Assistance with Grab bars .    UE Dressing:  Patient performed UE Dressing with Supervision or Set-up Assistance with extra time at Edge of bed.    Toilet Training:  Pt performed toileting with Stand-by Assistance with Grab  bar at [employer].    Balance:   Static Sit: GOOD: Takes MODERATE challenges from all directions  Dynamic Sit:  GOOD-: Incosistently Maintains balance through MODERATE excursions of active trunk movement,     Static Stand: FAIR+: Takes MINIMAL challenges from all directions  Dynamic stand: FAIR+: Needs CLOSE SUPERVISION during gait and is able to right self with minor LOB      Patient left sitting edge of bed with call button in reach and nurse notified    ASSESSMENT:  Pt demonstrated improved functional performance with UB dressing as noted by supervision in which patient able to perform all tasks while also donning  TLSO with extra time and cueing while seated EOB unsupported. Also, she demonstrated improved functional performance with toileting as noted by supervision in which patient able to perform perineal hygiene and clothing management; however, patient did not have bowel movement in which she exhibits difficultly performing perineal hygiene following BM due to difficulty reaching area to wipe self.     Rehab potential is good    Activity tolerance: Good    Discharge recommendations: other (see comments) (To be further determined based on progress prior to discharge.)     Equipment recommendations: other (see comments) (To be further determined based on progress prior to discharge.)     GOALS:   Multidisciplinary Problems     Occupational Therapy Goals        Problem: Occupational Therapy Goal    Goal Priority Disciplines Outcome Interventions   Occupational Therapy Goal     OT, PT/OT     Description: Long Term Goals to be met by: 03/29/22     Patient will increase functional independence with ADLs by performing:    Feeding with [place].  UE Dressing with Modified [place].  LE Dressing with Supervision.  Grooming while standing at sink with Modified [place].  Toileting from toilet with Supervision for hygiene and clothing management.   Bathing from  shower chair/bench with Supervision.  Step transfer with Modified [place]  Toilet transfer to toilet with Modified [place].  Increased functional strength to 4+/5 for bilateral UE's.                     Plan:  Patient to be seen 5 x/week (for 90 min per day for 14 days) to address the above listed problems via self-care/home management, community/work re-entry, therapeutic activities, therapeutic exercises, neuromuscular re-education  Plan of Care expires: 03/29/22  Plan of Care reviewed with: patient         03/18/2022

## 2022-03-18 NOTE — PT/OT/SLP PROGRESS
Physical Therapy         Treatment        Rachel Zazueta   MRN: [id number]     PT Received On: 03/18/22  Total Time (min): 90        Billable Minutes:  Gait [id number], Therapeutic Activity 30 and Therapeutic Exercise 30  Total Minutes: 90  AM Start Time: 0930  AM End Time: 1100  PM Start Time:   PM End Time:   Treatment Type: Individual 60 and Concurrent 30  Treatment Type: Treatment  PT/PTA: PT             General Precautions: Standard,  (back brace on when out of bed)  Orthopedic Precautions:     Braces:           Subjective:  Communicated with nurse prior to session.    Pain/Comfort  Pain Rating 1: 7/10  Location 1:  (low back)    Objective:  Patient found in bed, with      Functional Mobility:  Bed Mobility:   Supine to sit: Modified Independent   Sit to supine: Modified Independent   Rolling: Modified Independent   Scooting: Modified Independent    Balance:   Static Sit: GOOD: Takes MODERATE challenges from all directions  Dynamic Sit:  GOOD+: Maintains balance through MAXIMAL excursions of active trunk motion  Static Stand: FAIR+: Takes MINIMAL challenges from all directions  Dynamic stand: FAIR+: Needs CLOSE SUPERVISION during gait and is able to right self with minor LOB    Transfer Training:  Sit to stand:Supervision or Set-up Assistance with Rolling Walker    Bed <> Chair:  Step Transfer with Supervision or Set-up Assistance with Rolling Walker      Wheelchair Training:  Pt is ind with  mobility    Gait Training  Pt is supervision 200ft with rw    Stair Training:  Up and down 8 steps with cga      Additional Treatment:she performed ex in supported sitting and standing 4 sets of 20 reps with 2lb ankle wt ble exs. Pt also performed 3 sets of 10 reps sit to stand with supervision and cues for safe use of hands    Activity Tolerance:  Patient tolerated treatment well    Patient left supine with call button in reach.    Assessment:  Rachel Zazueta is a 41 y.o. female with a medical diagnosis of  Spondylodiscitis. She presents with ind with bed mobility and she is close to ind with transfers and gait    Rehab potential is good.    Activity tolerance: Good    Discharge recommendations: Discharge Facility/Level of Care Needs: home health PT     Equipment recommendations: Equipment Needed After Discharge: shower chair     GOALS:   Multidisciplinary Problems     Physical Therapy Goals     Not on file                PLAN:    Patient to be seen 5 x/week  to address the above listed problems via gait training, therapeutic activities, therapeutic exercises, therapeutic groups, neuromuscular re-education, wheelchair management/training  Plan of Care expires: 03/29/22  Plan of Care reviewed with: patient         3/18/2022

## 2022-03-18 NOTE — PLAN OF CARE
Problem: Infection  Goal: Absence of Infection Signs and Symptoms  Outcome: Ongoing, Progressing     Problem: Impaired Wound Healing  Goal: Optimal Wound Healing  Outcome: Ongoing, Progressing     Problem: Fall Injury Risk  Goal: Absence of Fall and Fall-Related Injury  Outcome: Ongoing, Progressing     Problem: Pain Acute  Goal: Acceptable Pain Control and Functional Ability  Outcome: Ongoing, Progressing

## 2022-03-19 PROCEDURE — 25000003 PHARM REV CODE 250: Performed by: INTERNAL MEDICINE

## 2022-03-19 PROCEDURE — 63600175 PHARM REV CODE 636 W HCPCS: Performed by: PHYSICIAN ASSISTANT

## 2022-03-19 PROCEDURE — A4216 STERILE WATER/SALINE, 10 ML: HCPCS | Performed by: INTERNAL MEDICINE

## 2022-03-19 PROCEDURE — 11000001 HC ACUTE MED/SURG PRIVATE ROOM

## 2022-03-19 PROCEDURE — C1751 CATH, INF, PER/CENT/MIDLINE: HCPCS

## 2022-03-19 PROCEDURE — 25000003 PHARM REV CODE 250: Performed by: PHYSICIAN ASSISTANT

## 2022-03-19 PROCEDURE — 10060 I&D ABSCESS SIMPLE/SINGLE: CPT

## 2022-03-19 PROCEDURE — 63600175 PHARM REV CODE 636 W HCPCS: Performed by: NURSE PRACTITIONER

## 2022-03-19 RX ADMIN — CEFTRIAXONE 2 G: 2 INJECTION, SOLUTION INTRAVENOUS at 04:03

## 2022-03-19 RX ADMIN — MUPIROCIN: 20 OINTMENT TOPICAL at 09:03

## 2022-03-19 RX ADMIN — DOCUSATE SODIUM 50MG AND SENNOSIDES 8.6MG 1 TABLET: 8.6; 5 TABLET, FILM COATED ORAL at 09:03

## 2022-03-19 RX ADMIN — Medication 10 ML: at 09:03

## 2022-03-19 RX ADMIN — PREGABALIN 75 MG: 75 CAPSULE ORAL at 09:03

## 2022-03-19 RX ADMIN — SERTRALINE HYDROCHLORIDE 50 MG: 50 TABLET ORAL at 09:03

## 2022-03-19 RX ADMIN — FUROSEMIDE 40 MG: 10 INJECTION, SOLUTION INTRAMUSCULAR; INTRAVENOUS at 09:03

## 2022-03-19 RX ADMIN — PANTOPRAZOLE SODIUM 40 MG: 40 TABLET, DELAYED RELEASE ORAL at 09:03

## 2022-03-19 RX ADMIN — FERROUS SULFATE TAB 325 MG (65 MG ELEMENTAL FE) 1 EACH: 325 (65 FE) TAB at 09:03

## 2022-03-19 RX ADMIN — MICONAZOLE NITRATE: 20 POWDER TOPICAL at 10:03

## 2022-03-19 RX ADMIN — OXYCODONE 10 MG: 5 TABLET ORAL at 09:03

## 2022-03-19 RX ADMIN — MICONAZOLE NITRATE: 20 POWDER TOPICAL at 09:03

## 2022-03-19 RX ADMIN — CEFTRIAXONE 2 G: 2 INJECTION, SOLUTION INTRAVENOUS at 03:03

## 2022-03-19 RX ADMIN — PREGABALIN 75 MG: 75 CAPSULE ORAL at 03:03

## 2022-03-19 NOTE — PLAN OF CARE
Problem: Adult Inpatient Plan of Care  Goal: Plan of Care Review  Outcome: Ongoing, Progressing  Goal: Patient-Specific Goal (Individualized)  Outcome: Ongoing, Progressing  Goal: Absence of Hospital-Acquired Illness or Injury  Outcome: Ongoing, Progressing  Goal: Optimal Comfort and Wellbeing  Outcome: Ongoing, Progressing  Goal: Readiness for Transition of Care  Outcome: Ongoing, Progressing     Problem: Bariatric Environmental Safety  Goal: Safety Maintained with Care  Outcome: Ongoing, Progressing     Problem: Infection  Goal: Absence of Infection Signs and Symptoms  Outcome: Ongoing, Progressing     Problem: Impaired Wound Healing  Goal: Optimal Wound Healing  Outcome: Ongoing, Progressing     Problem: Fall Injury Risk  Goal: Absence of Fall and Fall-Related Injury  Outcome: Ongoing, Progressing   Will continue to monitor and will maintain a safe environment

## 2022-03-19 NOTE — SUBJECTIVE & OBJECTIVE
Interval History: Patient seen and examined.    Review of Systems   Constitutional:  Positive for activity change and appetite change. Negative for chills and fever.   HENT:  Positive for dental problem. Negative for congestion, drooling, ear pain, mouth sores, nosebleeds, postnasal drip, sinus pressure, sore throat and trouble swallowing.    Eyes:  Negative for pain, itching and visual disturbance.   Respiratory:  Positive for shortness of breath. Negative for apnea, cough, chest tightness and wheezing.    Cardiovascular:  Positive for leg swelling. Negative for chest pain and palpitations.   Gastrointestinal:  Positive for constipation. Negative for abdominal distention, abdominal pain, blood in stool, diarrhea, nausea and vomiting.   Endocrine: Positive for cold intolerance and heat intolerance. Negative for polyphagia.   Genitourinary:  Negative for difficulty urinating, dysuria, flank pain, frequency, pelvic pain and vaginal bleeding.   Musculoskeletal:  Positive for back pain and gait problem. Negative for arthralgias and myalgias.   Skin:  Negative for rash.   Neurological:  Positive for weakness. Negative for dizziness, tremors, seizures, syncope, facial asymmetry, speech difficulty and headaches.   Hematological:  Negative for adenopathy.   Psychiatric/Behavioral:  Negative for agitation, confusion and hallucinations. The patient is nervous/anxious.    Objective:     Vital Signs (Most Recent):  Temp: 97.7 °F (36.5 °C) (03/19/22 0717)  Pulse: 86 (03/19/22 0717)  Resp: 18 (03/19/22 0954)  BP: (!) 99/51 (03/19/22 0717)  SpO2: 100 % (03/19/22 0717)   Vital Signs (24h Range):  Temp:  [97.7 °F (36.5 °C)-98.3 °F (36.8 °C)] 97.7 °F (36.5 °C)  Pulse:  [86-90] 86  Resp:  [18] 18  SpO2:  [98 %-100 %] 100 %  BP: ()/(51-55) 99/51     Weight: (!) 144.7 kg (319 lb)  Body mass index is 49.96 kg/m².    Intake/Output Summary (Last 24 hours) at 3/19/2022 1443  Last data filed at 3/19/2022 1200  Gross per 24 hour    Intake 1200 ml   Output 6800 ml   Net -5600 ml      Physical Exam  Constitutional:       General: She is awake. She is not in acute distress.     Appearance: Normal appearance. She is not ill-appearing, toxic-appearing or diaphoretic.   HENT:      Head: Normocephalic and atraumatic.      Nose: Nose normal. No congestion or rhinorrhea.      Mouth/Throat:      Mouth: Mucous membranes are moist.      Pharynx: Oropharynx is clear. No oropharyngeal exudate or posterior oropharyngeal erythema.   Eyes:      General: No scleral icterus.        Right eye: No discharge.         Left eye: No discharge.      Extraocular Movements: Extraocular movements intact.      Pupils: Pupils are equal, round, and reactive to light.   Neck:      Thyroid: No thyroid mass or thyromegaly.      Vascular: No carotid bruit.      Meningeal: Brudzinski's sign and Kernig's sign absent.   Cardiovascular:      Rate and Rhythm: Normal rate and regular rhythm.      Chest Wall: PMI is not displaced. No thrill.      Pulses: Normal pulses.      Heart sounds: Normal heart sounds. No murmur heard.    No friction rub. No gallop.   Pulmonary:      Effort: Pulmonary effort is normal. No tachypnea, accessory muscle usage, prolonged expiration or respiratory distress.      Breath sounds: Normal breath sounds. No stridor or decreased air movement. No wheezing, rhonchi or rales.   Chest:      Chest wall: No tenderness.   Abdominal:      General: Bowel sounds are normal. There is no distension.      Palpations: Abdomen is soft. There is no hepatomegaly, splenomegaly or mass.      Tenderness: There is no abdominal tenderness. There is no right CVA tenderness, left CVA tenderness, guarding or rebound.      Hernia: No hernia is present.   Musculoskeletal:         General: No swelling, tenderness, deformity or signs of injury. Normal range of motion.      Cervical back: Normal range of motion and neck supple. No rigidity. No muscular tenderness.      Right lower leg:  Edema present.      Left lower leg: Edema present.   Lymphadenopathy:      Cervical: No cervical adenopathy.   Skin:     General: Skin is warm.      Capillary Refill: Capillary refill takes less than 2 seconds.      Coloration: Skin is not cyanotic, jaundiced or pale.      Findings: No bruising, erythema, lesion, petechiae or rash.   Neurological:      Mental Status: She is alert and oriented to person, place, and time.      Cranial Nerves: No cranial nerve deficit, dysarthria or facial asymmetry.      Sensory: Sensory deficit present.      Motor: No tremor.      Gait: Gait abnormal.   Psychiatric:         Mood and Affect: Mood normal. Mood is not anxious or depressed. Affect is not flat.         Speech: Speech is not rapid and pressured or slurred.         Behavior: Behavior normal. Behavior is not agitated, aggressive or combative.         Thought Content: Thought content normal. Thought content is not paranoid or delusional.         Cognition and Memory: Cognition is not impaired. Memory is not impaired.         Judgment: Judgment normal.       Significant Labs: All pertinent labs within the past 24 hours have been reviewed.  Recent Lab Results       None            Significant Imaging: I have reviewed all pertinent imaging results/findings within the past 24 hours.

## 2022-03-19 NOTE — PROGRESS NOTES
[facility] Medicine  Progress Note    Patient Name: Rachel Zazueta  MRN: [id number]  Patient Class: - Rehab   Admission Date: 3/15/2022  Length of Stay: 4 days  Attending Physician: Sai Red III, MD  Primary Care Provider: Dannielle Merino DO        Subjective:     Principal Problem:Spondylodiscitis    HPI:      Patient is a 41-year-old female with a past medical history of hepatitis C cirrhosis of liver anemia and substance abuse.  Patient presented to the clinic in early February for follow-up of a L3 and L4 laminectomy with evacuation of epidural abscess and L3-L5 TLIF and April of 2021.  The patient had worsening weakness and pain and her neurosurgeons diagnosed her with hardware failure and new degeneration of the lower spine.  Patient reportedly had quit IV drug use which had contributed to her original back surgery and epidural abscess but did admit to continuing to use nicotine marijuana and methamphetamine at times.  Patient has severe back pain was unable to ambulate she was having weakness in both legs and was admitted to the acute care facility at the time of the office visit.  Patient's diagnostic testing showed likely infected hardware with failure and she was planned to have a cleanout with fusion.  She was also found to have a kidney stone around the same time had lithotripsy done as well as several ureteral stents that the patient states had become infected and caused complications.  After her lumbar surgery the patient was started on empiric vancomycin and ceftriaxone and was seen by infectious disease.  Psychiatry saw the patient and made several recommendations with the primary being avoid illicit drugs.  Patient's original postop course was slow because of pain psychiatric complications and ultimately she required blood transfusions and several revisions.  Patient has had difficulty with thrombocytopenia recurrent anemia all suspected related to her  chronic liver disease and hepatitis C. The patient was placed on antibiotic regimen for the next 30 days and we will review her chart at the outside facility for permissive cultures.  I have evaluated the patient this morning while doing therapy she seems very motivated and eager to improve.  She has a brace on her lower back and is only complaining of intermittent pain but feels she is getting stronger very rapidly.  The patient did well on the rehab unit sometime last year after a lumbar surgery.  We have encouraged her to avoid illicit substances and drugs and have motivated her.        Overview/Hospital Course:  3/17/22 [facility] patient with complaints of leg swelling with add lasix today and discontinue lidocaine patch due to refusal    3/18/22 [facility] Patient up and ambulating to the gym. Good improvement with lasix no complaints    3/19 [facility], weekend crosscover, vital signs reviewed, previous labs reviewed, chronic leukopenia, continue PT OT efforts      Interval History: Patient seen and examined.    Review of Systems   Constitutional:  Positive for activity change and appetite change. Negative for chills and fever.   HENT:  Positive for dental problem. Negative for congestion, drooling, ear pain, mouth sores, nosebleeds, postnasal drip, sinus pressure, sore throat and trouble swallowing.    Eyes:  Negative for pain, itching and visual disturbance.   Respiratory:  Positive for shortness of breath. Negative for apnea, cough, chest tightness and wheezing.    Cardiovascular:  Positive for leg swelling. Negative for chest pain and palpitations.   Gastrointestinal:  Positive for constipation. Negative for abdominal distention, abdominal pain, blood in stool, diarrhea, nausea and vomiting.   Endocrine: Positive for cold intolerance and heat intolerance. Negative for polyphagia.   Genitourinary:  Negative for difficulty urinating, dysuria, flank pain, frequency, pelvic pain and vaginal bleeding.   Musculoskeletal:  Positive  for back pain and gait problem. Negative for arthralgias and myalgias.   Skin:  Negative for rash.   Neurological:  Positive for weakness. Negative for dizziness, tremors, seizures, syncope, facial asymmetry, speech difficulty and headaches.   Hematological:  Negative for adenopathy.   Psychiatric/Behavioral:  Negative for agitation, confusion and hallucinations. The patient is nervous/anxious.    Objective:     Vital Signs (Most Recent):  Temp: 97.7 °F (36.5 °C) (03/19/22 0717)  Pulse: 86 (03/19/22 0717)  Resp: 18 (03/19/22 0954)  BP: (!) 99/51 (03/19/22 0717)  SpO2: 100 % (03/19/22 0717)   Vital Signs (24h Range):  Temp:  [97.7 °F (36.5 °C)-98.3 °F (36.8 °C)] 97.7 °F (36.5 °C)  Pulse:  [86-90] 86  Resp:  [18] 18  SpO2:  [98 %-100 %] 100 %  BP: ()/(51-55) 99/51     Weight: (!) 144.7 kg (319 lb)  Body mass index is 49.96 kg/m².    Intake/Output Summary (Last 24 hours) at 3/19/2022 1443  Last data filed at 3/19/2022 1200  Gross per 24 hour   Intake 1200 ml   Output 6800 ml   Net -5600 ml      Physical Exam  Constitutional:       General: She is awake. She is not in acute distress.     Appearance: Normal appearance. She is not ill-appearing, toxic-appearing or diaphoretic.   HENT:      Head: Normocephalic and atraumatic.      Nose: Nose normal. No congestion or rhinorrhea.      Mouth/Throat:      Mouth: Mucous membranes are moist.      Pharynx: Oropharynx is clear. No oropharyngeal exudate or posterior oropharyngeal erythema.   Eyes:      General: No scleral icterus.        Right eye: No discharge.         Left eye: No discharge.      Extraocular Movements: Extraocular movements intact.      Pupils: Pupils are equal, round, and reactive to light.   Neck:      Thyroid: No thyroid mass or thyromegaly.      Vascular: No carotid bruit.      Meningeal: Brudzinski's sign and Kernig's sign absent.   Cardiovascular:      Rate and Rhythm: Normal rate and regular rhythm.      Chest Wall: PMI is not displaced. No thrill.       Pulses: Normal pulses.      Heart sounds: Normal heart sounds. No murmur heard.    No friction rub. No gallop.   Pulmonary:      Effort: Pulmonary effort is normal. No tachypnea, accessory muscle usage, prolonged expiration or respiratory distress.      Breath sounds: Normal breath sounds. No stridor or decreased air movement. No wheezing, rhonchi or rales.   Chest:      Chest wall: No tenderness.   Abdominal:      General: Bowel sounds are normal. There is no distension.      Palpations: Abdomen is soft. There is no hepatomegaly, splenomegaly or mass.      Tenderness: There is no abdominal tenderness. There is no right CVA tenderness, left CVA tenderness, guarding or rebound.      Hernia: No hernia is present.   Musculoskeletal:         General: No swelling, tenderness, deformity or signs of injury. Normal range of motion.      Cervical back: Normal range of motion and neck supple. No rigidity. No muscular tenderness.      Right lower leg: Edema present.      Left lower leg: Edema present.   Lymphadenopathy:      Cervical: No cervical adenopathy.   Skin:     General: Skin is warm.      Capillary Refill: Capillary refill takes less than 2 seconds.      Coloration: Skin is not cyanotic, jaundiced or pale.      Findings: No bruising, erythema, lesion, petechiae or rash.   Neurological:      Mental Status: She is alert and oriented to person, place, and time.      Cranial Nerves: No cranial nerve deficit, dysarthria or facial asymmetry.      Sensory: Sensory deficit present.      Motor: No tremor.      Gait: Gait abnormal.   Psychiatric:         Mood and Affect: Mood normal. Mood is not anxious or depressed. Affect is not flat.         Speech: Speech is not rapid and pressured or slurred.         Behavior: Behavior normal. Behavior is not agitated, aggressive or combative.         Thought Content: Thought content normal. Thought content is not paranoid or delusional.         Cognition and Memory: Cognition is not  impaired. Memory is not impaired.         Judgment: Judgment normal.       Significant Labs: All pertinent labs within the past 24 hours have been reviewed.  Recent Lab Results       None            Significant Imaging: I have reviewed all pertinent imaging results/findings within the past 24 hours.      Assessment/Plan:      * Spondylodiscitis  Continue IPR efforts      Edema  Lasix 40 bid    3/18/22 [facility] good diureses with lasix      Mild episode of recurrent major depressive disorder  Mood stable        Abscess in epidural space of lumbar spine  Continue antibiotics    3/18/22 [facility] please take picture of surgical incision today      Substance use disorder  Counseled on cessation      Chronic hepatitis C with cirrhosis  Follow up with GI or hepatology if not already      Cirrhosis of liver without ascites  Stable, monitor labs      Pancytopenia  monitor        VTE Risk Mitigation (From admission, onward)    None          Discharge Planning   SHIRA:      Code Status: Full Code   Is the patient medically ready for discharge?:     Reason for patient still in hospital (select all that apply): PT / OT recommendations  Discharge Plan A: Home                  Jonas Adam MD  Department of Hospital Medicine   [facility] ([facility])

## 2022-03-19 NOTE — PLAN OF CARE
Problem: Infection  Goal: Absence of Infection Signs and Symptoms  Outcome: Ongoing, Progressing     Problem: Impaired Wound Healing  Goal: Optimal Wound Healing  Outcome: Ongoing, Progressing     Problem: Fall Injury Risk  Goal: Absence of Fall and Fall-Related Injury  Outcome: Ongoing, Progressing     Problem: Pain Acute  Goal: Acceptable Pain Control and Functional Ability  Outcome: Ongoing, Progressing     Problem: Skin Injury Risk Increased  Goal: Skin Health and Integrity  Outcome: Ongoing, Progressing

## 2022-03-20 PROCEDURE — 25000003 PHARM REV CODE 250: Performed by: PHYSICIAN ASSISTANT

## 2022-03-20 PROCEDURE — 63600175 PHARM REV CODE 636 W HCPCS: Performed by: PHYSICIAN ASSISTANT

## 2022-03-20 PROCEDURE — 11000001 HC ACUTE MED/SURG PRIVATE ROOM

## 2022-03-20 PROCEDURE — A4216 STERILE WATER/SALINE, 10 ML: HCPCS | Performed by: INTERNAL MEDICINE

## 2022-03-20 PROCEDURE — 63600175 PHARM REV CODE 636 W HCPCS: Performed by: NURSE PRACTITIONER

## 2022-03-20 PROCEDURE — 10060 I&D ABSCESS SIMPLE/SINGLE: CPT

## 2022-03-20 PROCEDURE — C1751 CATH, INF, PER/CENT/MIDLINE: HCPCS

## 2022-03-20 PROCEDURE — 25000003 PHARM REV CODE 250: Performed by: INTERNAL MEDICINE

## 2022-03-20 RX ADMIN — PREGABALIN 75 MG: 75 CAPSULE ORAL at 02:03

## 2022-03-20 RX ADMIN — MICONAZOLE NITRATE: 20 POWDER TOPICAL at 09:03

## 2022-03-20 RX ADMIN — FERROUS SULFATE TAB 325 MG (65 MG ELEMENTAL FE) 1 EACH: 325 (65 FE) TAB at 09:03

## 2022-03-20 RX ADMIN — PREGABALIN 75 MG: 75 CAPSULE ORAL at 09:03

## 2022-03-20 RX ADMIN — DOCUSATE SODIUM 50MG AND SENNOSIDES 8.6MG 1 TABLET: 8.6; 5 TABLET, FILM COATED ORAL at 09:03

## 2022-03-20 RX ADMIN — FUROSEMIDE 40 MG: 10 INJECTION, SOLUTION INTRAMUSCULAR; INTRAVENOUS at 09:03

## 2022-03-20 RX ADMIN — PANTOPRAZOLE SODIUM 40 MG: 40 TABLET, DELAYED RELEASE ORAL at 09:03

## 2022-03-20 RX ADMIN — MUPIROCIN: 20 OINTMENT TOPICAL at 09:03

## 2022-03-20 RX ADMIN — CEFTRIAXONE 2 G: 2 INJECTION, SOLUTION INTRAVENOUS at 03:03

## 2022-03-20 RX ADMIN — Medication 6 MG: at 03:03

## 2022-03-20 RX ADMIN — Medication 10 ML: at 09:03

## 2022-03-20 RX ADMIN — SERTRALINE HYDROCHLORIDE 50 MG: 50 TABLET ORAL at 09:03

## 2022-03-20 RX ADMIN — OXYCODONE HYDROCHLORIDE 20 MG: 5 TABLET ORAL at 03:03

## 2022-03-20 RX ADMIN — OXYCODONE 10 MG: 5 TABLET ORAL at 12:03

## 2022-03-20 NOTE — NURSING
Agitated. On the phone talking to a significant other. Crying and threatening to walk out of the hospital . Cursed our staff when they knocked on her door to talk with or to offer her help . House supervisor notified and Dr. Adam. Kieran BROWNE Unit director also notified.Pt refused to take her lasix today and refused her paiin medication that she had requested. Loud and beligerent behavior

## 2022-03-20 NOTE — SUBJECTIVE & OBJECTIVE
Interval History: Patient seen and examined.    Review of Systems   Constitutional:  Positive for activity change and appetite change. Negative for chills and fever.   HENT:  Positive for dental problem. Negative for congestion, drooling, ear pain, mouth sores, nosebleeds, postnasal drip, sinus pressure, sore throat and trouble swallowing.    Eyes:  Negative for pain, itching and visual disturbance.   Respiratory:  Positive for shortness of breath. Negative for apnea, cough, chest tightness and wheezing.    Cardiovascular:  Positive for leg swelling. Negative for chest pain and palpitations.   Gastrointestinal:  Positive for constipation. Negative for abdominal distention, abdominal pain, blood in stool, diarrhea, nausea and vomiting.   Endocrine: Positive for cold intolerance and heat intolerance. Negative for polyphagia.   Genitourinary:  Negative for difficulty urinating, dysuria, flank pain, frequency, pelvic pain and vaginal bleeding.   Musculoskeletal:  Positive for back pain and gait problem. Negative for arthralgias and myalgias.   Skin:  Negative for rash.   Neurological:  Positive for weakness. Negative for dizziness, tremors, seizures, syncope, facial asymmetry, speech difficulty and headaches.   Hematological:  Negative for adenopathy.   Psychiatric/Behavioral:  Negative for agitation, confusion and hallucinations. The patient is nervous/anxious.    Objective:     Vital Signs (Most Recent):  Temp: 98.2 °F (36.8 °C) (03/20/22 0645)  Pulse: 90 (03/20/22 0645)  Resp: 18 (03/20/22 1248)  BP: (!) 113/56 (03/20/22 0645)  SpO2: 100 % (03/20/22 0645)   Vital Signs (24h Range):  Temp:  [98.1 °F (36.7 °C)-98.4 °F (36.9 °C)] 98.2 °F (36.8 °C)  Pulse:  [82-90] 90  Resp:  [18-20] 18  SpO2:  [96 %-100 %] 100 %  BP: ()/(54-58) 113/56     Weight: (!) 144.7 kg (319 lb)  Body mass index is 49.96 kg/m².    Intake/Output Summary (Last 24 hours) at 3/20/2022 1613  Last data filed at 3/20/2022 1300  Gross per 24 hour    Intake 2281 ml   Output --   Net 2281 ml        Physical Exam  Constitutional:       General: She is awake. She is not in acute distress.     Appearance: Normal appearance. She is not ill-appearing, toxic-appearing or diaphoretic.   HENT:      Head: Normocephalic.   Eyes:      General: No scleral icterus.        Right eye: No discharge.         Left eye: No discharge.      Pupils: Pupils are equal, round, and reactive to light.   Neck:      Thyroid: No thyroid mass or thyromegaly.      Meningeal: Brudzinski's sign and Kernig's sign absent.   Cardiovascular:      Chest Wall: PMI is not displaced. No thrill.      Heart sounds: No murmur heard.  Pulmonary:      Effort: Pulmonary effort is normal. No tachypnea, accessory muscle usage, prolonged expiration or respiratory distress.      Breath sounds: No decreased air movement.   Abdominal:      Palpations: There is no hepatomegaly or splenomegaly.   Musculoskeletal:      Cervical back: No muscular tenderness.   Skin:     Capillary Refill: Capillary refill takes less than 2 seconds.      Coloration: Skin is not cyanotic.      Findings: No petechiae.   Neurological:      Mental Status: She is alert and oriented to person, place, and time.      Cranial Nerves: No dysarthria or facial asymmetry.      Motor: No tremor.   Psychiatric:         Mood and Affect: Affect is angry.         Speech: She is communicative.         Behavior: Behavior is agitated.         Thought Content: Thought content is not paranoid or delusional.         Cognition and Memory: Cognition is not impaired. Memory is not impaired.       Significant Labs: All pertinent labs within the past 24 hours have been reviewed.  Recent Lab Results       None            Significant Imaging: I have reviewed all pertinent imaging results/findings within the past 24 hours.

## 2022-03-20 NOTE — NURSING
Patient has spoken to Dr. Adam regarding desire to go home. Dr. Adam spoke to her regarding some egative outcomes that could occur if she left without the approval of her doctor and surgeon. Patient decided to wait until tomorrow and would  speak to  111. Pt is currently calm.

## 2022-03-20 NOTE — PLAN OF CARE
Problem: Adult Inpatient Plan of Care  Goal: Plan of Care Review  Outcome: Ongoing, Progressing  Goal: Absence of Hospital-Acquired Illness or Injury  Outcome: Ongoing, Progressing  Goal: Optimal Comfort and Wellbeing  Outcome: Ongoing, Progressing     Problem: Infection  Goal: Absence of Infection Signs and Symptoms  Outcome: Ongoing, Progressing     Problem: Impaired Wound Healing  Goal: Optimal Wound Healing  Outcome: Ongoing, Progressing     Problem: Fall Injury Risk  Goal: Absence of Fall and Fall-Related Injury  Outcome: Ongoing, Progressing   Will continue to monitor and will provide a safe environment

## 2022-03-20 NOTE — PLAN OF CARE
Problem: Impaired Wound Healing  Goal: Optimal Wound Healing  Outcome: Ongoing, Progressing     Problem: Fall Injury Risk  Goal: Absence of Fall and Fall-Related Injury  Outcome: Ongoing, Progressing     Problem: Pain Acute  Goal: Acceptable Pain Control and Functional Ability  Outcome: Ongoing, Progressing     Problem: Skin Injury Risk Increased  Goal: Skin Health and Integrity  Outcome: Ongoing, Progressing

## 2022-03-20 NOTE — ASSESSMENT & PLAN NOTE
Mood stable  -patient upset and wanted to leave, discussed risks of leaving AMA without proper follow up and continuation of care, patient agreed to stay till tomorrow, will need to discuss with case management options

## 2022-03-20 NOTE — PROGRESS NOTES
[facility] Medicine  Progress Note    Patient Name: Rachel Zazueta  MRN: [id number]  Patient Class: - Rehab   Admission Date: 3/15/2022  Length of Stay: 5 days  Attending Physician: Sai Red III, MD  Primary Care Provider: Dannielle Merino DO        Subjective:     Principal Problem:Spondylodiscitis        HPI:      Patient is a 41-year-old female with a past medical history of hepatitis C cirrhosis of liver anemia and substance abuse.  Patient presented to the clinic in early February for follow-up of a L3 and L4 laminectomy with evacuation of epidural abscess and L3-L5 TLIF and April of 2021.  The patient had worsening weakness and pain and her neurosurgeons diagnosed her with hardware failure and new degeneration of the lower spine.  Patient reportedly had quit IV drug use which had contributed to her original back surgery and epidural abscess but did admit to continuing to use nicotine marijuana and methamphetamine at times.  Patient has severe back pain was unable to ambulate she was having weakness in both legs and was admitted to the acute care facility at the time of the office visit.  Patient's diagnostic testing showed likely infected hardware with failure and she was planned to have a cleanout with fusion.  She was also found to have a kidney stone around the same time had lithotripsy done as well as several ureteral stents that the patient states had become infected and caused complications.  After her lumbar surgery the patient was started on empiric vancomycin and ceftriaxone and was seen by infectious disease.  Psychiatry saw the patient and made several recommendations with the primary being avoid illicit drugs.  Patient's original postop course was slow because of pain psychiatric complications and ultimately she required blood transfusions and several revisions.  Patient has had difficulty with thrombocytopenia recurrent anemia all suspected related to her  chronic liver disease and hepatitis C. The patient was placed on antibiotic regimen for the next 30 days and we will review her chart at the outside facility for permissive cultures.  I have evaluated the patient this morning while doing therapy she seems very motivated and eager to improve.  She has a brace on her lower back and is only complaining of intermittent pain but feels she is getting stronger very rapidly.  The patient did well on the rehab unit sometime last year after a lumbar surgery.  We have encouraged her to avoid illicit substances and drugs and have motivated her.        Overview/Hospital Course:  3/17/22 [facility] patient with complaints of leg swelling with add lasix today and discontinue lidocaine patch due to refusal    3/18/22 [facility] Patient up and ambulating to the gym. Good improvement with lasix no complaints    3/19 [facility], weekend crosscover, vital signs reviewed, previous labs reviewed, chronic leukopenia, continue PT OT efforts   3/20 [facility], weekend crosscover, vitals reviewed, called by nurse, patient had wanted to leave AMA, discussed with patient risks involved with leaving and stopping abx therapy, patient agreed to stay, will discuss with case management in AM about possible home infusion for abx and outpatient therapy      Interval History: Patient seen and examined.    Review of Systems   Constitutional:  Positive for activity change and appetite change. Negative for chills and fever.   HENT:  Positive for dental problem. Negative for congestion, drooling, ear pain, mouth sores, nosebleeds, postnasal drip, sinus pressure, sore throat and trouble swallowing.    Eyes:  Negative for pain, itching and visual disturbance.   Respiratory:  Positive for shortness of breath. Negative for apnea, cough, chest tightness and wheezing.    Cardiovascular:  Positive for leg swelling. Negative for chest pain and palpitations.   Gastrointestinal:  Positive for constipation. Negative for abdominal distention,  abdominal pain, blood in stool, diarrhea, nausea and vomiting.   Endocrine: Positive for cold intolerance and heat intolerance. Negative for polyphagia.   Genitourinary:  Negative for difficulty urinating, dysuria, flank pain, frequency, pelvic pain and vaginal bleeding.   Musculoskeletal:  Positive for back pain and gait problem. Negative for arthralgias and myalgias.   Skin:  Negative for rash.   Neurological:  Positive for weakness. Negative for dizziness, tremors, seizures, syncope, facial asymmetry, speech difficulty and headaches.   Hematological:  Negative for adenopathy.   Psychiatric/Behavioral:  Negative for agitation, confusion and hallucinations. The patient is nervous/anxious.    Objective:     Vital Signs (Most Recent):  Temp: 98.2 °F (36.8 °C) (03/20/22 0645)  Pulse: 90 (03/20/22 0645)  Resp: 18 (03/20/22 1248)  BP: (!) 113/56 (03/20/22 0645)  SpO2: 100 % (03/20/22 0645)   Vital Signs (24h Range):  Temp:  [98.1 °F (36.7 °C)-98.4 °F (36.9 °C)] 98.2 °F (36.8 °C)  Pulse:  [82-90] 90  Resp:  [18-20] 18  SpO2:  [96 %-100 %] 100 %  BP: ()/(54-58) 113/56     Weight: (!) 144.7 kg (319 lb)  Body mass index is 49.96 kg/m².    Intake/Output Summary (Last 24 hours) at 3/20/2022 1613  Last data filed at 3/20/2022 1300  Gross per 24 hour   Intake 2281 ml   Output --   Net 2281 ml        Physical Exam  Constitutional:       General: She is awake. She is not in acute distress.     Appearance: Normal appearance. She is not ill-appearing, toxic-appearing or diaphoretic.   HENT:      Head: Normocephalic.   Eyes:      General: No scleral icterus.        Right eye: No discharge.         Left eye: No discharge.      Pupils: Pupils are equal, round, and reactive to light.   Neck:      Thyroid: No thyroid mass or thyromegaly.      Meningeal: Brudzinski's sign and Kernig's sign absent.   Cardiovascular:      Chest Wall: PMI is not displaced. No thrill.      Heart sounds: No murmur heard.  Pulmonary:      Effort:  Pulmonary effort is normal. No tachypnea, accessory muscle usage, prolonged expiration or respiratory distress.      Breath sounds: No decreased air movement.   Abdominal:      Palpations: There is no hepatomegaly or splenomegaly.   Musculoskeletal:      Cervical back: No muscular tenderness.   Skin:     Capillary Refill: Capillary refill takes less than 2 seconds.      Coloration: Skin is not cyanotic.      Findings: No petechiae.   Neurological:      Mental Status: She is alert and oriented to person, place, and time.      Cranial Nerves: No dysarthria or facial asymmetry.      Motor: No tremor.   Psychiatric:         Mood and Affect: Affect is angry.         Speech: She is communicative.         Behavior: Behavior is agitated.         Thought Content: Thought content is not paranoid or delusional.         Cognition and Memory: Cognition is not impaired. Memory is not impaired.       Significant Labs: All pertinent labs within the past 24 hours have been reviewed.  Recent Lab Results       None            Significant Imaging: I have reviewed all pertinent imaging results/findings within the past 24 hours.      Assessment/Plan:      * Spondylodiscitis  Continue IPR efforts      Edema  Lasix 40 bid    3/18/22 [facility] good diureses with lasix      Mild episode of recurrent major depressive disorder  Mood stable  -patient upset and wanted to leave, discussed risks of leaving AMA without proper follow up and continuation of care, patient agreed to stay till tomorrow, will need to discuss with case management options      Abscess in epidural space of lumbar spine  Continue antibiotics    3/18/22 [facility] please take picture of surgical incision today      Substance use disorder  Counseled on cessation      Chronic hepatitis C with cirrhosis  Follow up with GI or hepatology if not already      Cirrhosis of liver without ascites  Stable, monitor labs      Pancytopenia  monitor        VTE Risk Mitigation (From admission, onward)     None          Discharge Planning   SHIRA:      Code Status: Full Code   Is the patient medically ready for discharge?:     Reason for patient still in hospital (select all that apply): Treatment and PT / OT recommendations  Discharge Plan A: Home                  Jonas Adam MD  Department of Hospital Medicine   [facility] ([facility]

## 2022-03-21 ENCOUNTER — DOCUMENTATION ONLY (OUTPATIENT)
Dept: TRANSPLANT | Facility: CLINIC | Age: 42
End: 2022-03-21
Payer: MEDICAID

## 2022-03-21 LAB
ANION GAP SERPL CALC-SCNC: 1 MMOL/L (ref 8–16)
BASOPHILS # BLD AUTO: 0.03 K/UL (ref 0–0.2)
BASOPHILS NFR BLD: 1.3 % (ref 0–1.9)
BUN SERPL-MCNC: 9 MG/DL (ref 6–20)
CALCIUM SERPL-MCNC: 8.1 MG/DL (ref 8.7–10.5)
CHLORIDE SERPL-SCNC: 111 MMOL/L (ref 95–110)
CO2 SERPL-SCNC: 31 MMOL/L (ref 23–29)
CREAT SERPL-MCNC: 0.6 MG/DL (ref 0.5–1.4)
DIFFERENTIAL METHOD: ABNORMAL
EOSINOPHIL # BLD AUTO: 0.2 K/UL (ref 0–0.5)
EOSINOPHIL NFR BLD: 8.9 % (ref 0–8)
ERYTHROCYTE [DISTWIDTH] IN BLOOD BY AUTOMATED COUNT: 15.9 % (ref 11.5–14.5)
EST. GFR  (AFRICAN AMERICAN): >60 ML/MIN/1.73 M^2
EST. GFR  (NON AFRICAN AMERICAN): >60 ML/MIN/1.73 M^2
GLUCOSE SERPL-MCNC: 81 MG/DL (ref 70–110)
HCT VFR BLD AUTO: 31.8 % (ref 37–48.5)
HGB BLD-MCNC: 9.9 G/DL (ref 12–16)
IMM GRANULOCYTES # BLD AUTO: 0 K/UL (ref 0–0.04)
IMM GRANULOCYTES NFR BLD AUTO: 0 % (ref 0–0.5)
LYMPHOCYTES # BLD AUTO: 0.5 K/UL (ref 1–4.8)
LYMPHOCYTES NFR BLD: 22.2 % (ref 18–48)
MAGNESIUM SERPL-MCNC: 1.9 MG/DL (ref 1.6–2.6)
MCH RBC QN AUTO: 28.3 PG (ref 27–31)
MCHC RBC AUTO-ENTMCNC: 31.1 G/DL (ref 32–36)
MCV RBC AUTO: 91 FL (ref 82–98)
MONOCYTES # BLD AUTO: 0.2 K/UL (ref 0.3–1)
MONOCYTES NFR BLD: 9.8 % (ref 4–15)
NEUTROPHILS # BLD AUTO: 1.3 K/UL (ref 1.8–7.7)
NEUTROPHILS NFR BLD: 57.8 % (ref 38–73)
NRBC BLD-RTO: 0 /100 WBC
PHOSPHATE SERPL-MCNC: 3.5 MG/DL (ref 2.7–4.5)
PLATELET # BLD AUTO: 87 K/UL (ref 150–450)
PMV BLD AUTO: 10.3 FL (ref 9.2–12.9)
POTASSIUM SERPL-SCNC: 3.8 MMOL/L (ref 3.5–5.1)
RBC # BLD AUTO: 3.5 M/UL (ref 4–5.4)
SODIUM SERPL-SCNC: 143 MMOL/L (ref 136–145)
WBC # BLD AUTO: 2.25 K/UL (ref 3.9–12.7)

## 2022-03-21 PROCEDURE — 63600175 PHARM REV CODE 636 W HCPCS: Performed by: PHYSICIAN ASSISTANT

## 2022-03-21 PROCEDURE — A4216 STERILE WATER/SALINE, 10 ML: HCPCS | Performed by: INTERNAL MEDICINE

## 2022-03-21 PROCEDURE — 25000003 PHARM REV CODE 250: Performed by: PHYSICIAN ASSISTANT

## 2022-03-21 PROCEDURE — 80048 BASIC METABOLIC PNL TOTAL CA: CPT | Performed by: PHYSICIAN ASSISTANT

## 2022-03-21 PROCEDURE — 97530 THERAPEUTIC ACTIVITIES: CPT

## 2022-03-21 PROCEDURE — 97110 THERAPEUTIC EXERCISES: CPT

## 2022-03-21 PROCEDURE — 36415 COLL VENOUS BLD VENIPUNCTURE: CPT | Performed by: PHYSICIAN ASSISTANT

## 2022-03-21 PROCEDURE — 84100 ASSAY OF PHOSPHORUS: CPT | Performed by: PHYSICIAN ASSISTANT

## 2022-03-21 PROCEDURE — 25000003 PHARM REV CODE 250: Performed by: INTERNAL MEDICINE

## 2022-03-21 PROCEDURE — 63600175 PHARM REV CODE 636 W HCPCS: Performed by: NURSE PRACTITIONER

## 2022-03-21 PROCEDURE — 97535 SELF CARE MNGMENT TRAINING: CPT

## 2022-03-21 PROCEDURE — 11000001 HC ACUTE MED/SURG PRIVATE ROOM

## 2022-03-21 PROCEDURE — 83735 ASSAY OF MAGNESIUM: CPT | Performed by: PHYSICIAN ASSISTANT

## 2022-03-21 PROCEDURE — 85025 COMPLETE CBC W/AUTO DIFF WBC: CPT | Performed by: PHYSICIAN ASSISTANT

## 2022-03-21 PROCEDURE — 97116 GAIT TRAINING THERAPY: CPT

## 2022-03-21 RX ADMIN — PANTOPRAZOLE SODIUM 40 MG: 40 TABLET, DELAYED RELEASE ORAL at 09:03

## 2022-03-21 RX ADMIN — OXYCODONE HYDROCHLORIDE 20 MG: 5 TABLET ORAL at 10:03

## 2022-03-21 RX ADMIN — CEFTRIAXONE 2 G: 2 INJECTION, SOLUTION INTRAVENOUS at 04:03

## 2022-03-21 RX ADMIN — FERROUS SULFATE TAB 325 MG (65 MG ELEMENTAL FE) 1 EACH: 325 (65 FE) TAB at 09:03

## 2022-03-21 RX ADMIN — OXYCODONE HYDROCHLORIDE 20 MG: 5 TABLET ORAL at 04:03

## 2022-03-21 RX ADMIN — PREGABALIN 75 MG: 75 CAPSULE ORAL at 10:03

## 2022-03-21 RX ADMIN — CEFTRIAXONE 2 G: 2 INJECTION, SOLUTION INTRAVENOUS at 03:03

## 2022-03-21 RX ADMIN — Medication 10 ML: at 08:03

## 2022-03-21 RX ADMIN — FUROSEMIDE 40 MG: 10 INJECTION, SOLUTION INTRAMUSCULAR; INTRAVENOUS at 08:03

## 2022-03-21 RX ADMIN — OXYCODONE HYDROCHLORIDE 20 MG: 5 TABLET ORAL at 01:03

## 2022-03-21 RX ADMIN — PREGABALIN 75 MG: 75 CAPSULE ORAL at 08:03

## 2022-03-21 RX ADMIN — Medication 10 ML: at 09:03

## 2022-03-21 RX ADMIN — Medication 6 MG: at 01:03

## 2022-03-21 RX ADMIN — PREGABALIN 75 MG: 75 CAPSULE ORAL at 04:03

## 2022-03-21 RX ADMIN — SERTRALINE HYDROCHLORIDE 50 MG: 50 TABLET ORAL at 09:03

## 2022-03-21 RX ADMIN — MICONAZOLE NITRATE: 20 POWDER TOPICAL at 10:03

## 2022-03-21 NOTE — PT/OT/SLP PROGRESS
Occupational Therapy  Weekly Progress Note    Rachel Zazueta   MRN: [id number]   Admitting Diagnosis: Spondylodiscitis    OT Date of Treatment: 03/21/22   AM Start Time: na  AM End Time: na  PM Start Time: 1330  PM End Time: 1500  Treatment Type: Individual 60 and Concurrent 30  Total Time (min): 90 min      Billable Minutes:  Self Care/Home Management 90  Total Minutes: 90    General Precautions: Standard, fall  Orthopedic Precautions: spinal precautions  Braces:      Spiritual, Cultural Beliefs, Latter-day Practices, Values that Affect Care: no    Subjective:  Communicated with nurse prior to session.    Pain/Comfort  Pain Rating 1: 5/10  Location - Side 1: Bilateral  Location - Orientation 1: medial  Location 1: back  Pain Addressed 1: Reposition, Cessation of Activity, Pre-medicate for activity  Pain Rating Post-Intervention 1: 5/10    Objective:  Pt was cooperative and motivated without verbal encouragement while exhibiting positive affect. She participated in extensive ADL retraining as further noted below emphasizing use of compensatory strategies, assistive devices, and adaptive equipment while sustain Spinal Precautions providing extra time.     Functional Mobility:  Bed Mobility:   Supine to sit: Modified Independent   Sit to supine: Modified Independent   Rolling: Modified Independent   Scooting: Modified Independent    Transfer Training:   Sit to stand:Modified Independent with Rolling Walker .  Bed <> Chair:  Step Transfer with Modified Independent with Rolling Walker .  Toilet Transfer:  Pt Step Transfer with Modified Independent with Grab bars .  Patient performed shower transfer Step Transfer with Modified Independent with Grab bars and shower chair.    Feeding:  Patient performed feeding with Independent.    Grooming:  Patient peformed hand washing with Modified Independent at standing at sink.  Patient performed face washing with Modified Independent at standing at sink.  Patient performed oral  hygeine with Modified Independent at standing at sink.  Patient performe hair grooming with Modified Independent at standing at sink.    Bathing:  Patient performed bathing with Modified Independent with grab bar, Handheld shower head, long-handled sponge and shower chair at Shower.    UE Dressing:  Patient performed UE Dressing with Modified Independent with extra time at Edge of bed.    LE Dressing:  Patient don/doffed socks with Modified Independent, Patient don/doffed shoes with Modified Independent, Patient performed don/doffed adult brief with Modified Independent and Patient performed don/doffed pants with Modified Independent    Toilet Training:  Pt performed toileting with Modified Independent with Grab  bar at [facility].    Balance:   Static Sit: GOOD: Takes MODERATE challenges from all directions  Dynamic Sit:  GOOD: Maintains balance through MODERATE excursions of active trunk movement  Static Stand: GOOD: Takes MODERATE challenges from all directions  Dynamic stand: GOOD: Needs SUPERVISION only during gait and able to self right with moderate LOB      Patient left supine with call button in reach and nurse notified    ASSESSMENT:  Pt demonstrated good progress with functional goals as noted below by changes in functional scores in which she achieved 8/8 STG's and 9/9 LTG's. Pt now modified independent with ADL's including functional mobility with extra time and use of adaptive equipment / assistive devices while sustaining compliance with Spinal Precautions.       Rehab potential is good    Activity tolerance: Good    Discharge recommendations: other (see comments) (To be further determined based on progress prior to discharge.)     Equipment recommendations: other (see comments) (To be further determined based on progress prior to discharge.)     GOALS:   Short Term Goals to be met by: 03/22/22      Patient will increase functional independence with ADLs by performing:     UE Dressing with Supervision.  MET  LE Dressing with Moderate Assistance. MET  Grooming while standing at sink with Supervision. MET  Toileting from toilet with Minimal Assistance for hygiene and clothing management. MET  Bathing from  shower chair/bench with Minimal Assistance. MET  Step transfer with Stand-by Assistance. MET  Toilet transfer to toilet with Stand-by Assistance. MET  Increased functional strength to 4/5 for bilateral UE's. MET     Long Term Goals to be met by: 03/29/22      Patient will increase functional independence with ADLs by performing:     Feeding with [place]. MET  UE Dressing with Modified [place]. MET  LE Dressing with Supervision. MET  Grooming while standing at sink with Modified [place]. MET  Toileting from toilet with Supervision for hygiene and clothing management. MET  Bathing from  shower chair/bench with Supervision. MET  Step transfer with Modified [place]. MET  Toilet transfer to toilet with Modified [place]. MET  Increased functional strength to 4+/5 for bilateral UE's. MET      Plan:  Patient to be seen 5 x/week (for 90 min per day for 14 days) to address the above listed problems via self-care/home management, community/work re-entry, therapeutic activities, therapeutic exercises, neuromuscular re-education  Plan of Care expires: 03/29/22  Plan of Care reviewed with: patient         03/21/2022

## 2022-03-21 NOTE — PLAN OF CARE
Problem: Fall Injury Risk  Goal: Absence of Fall and Fall-Related Injury  Outcome: Ongoing, Progressing     Problem: Pain Acute  Goal: Acceptable Pain Control and Functional Ability  Outcome: Ongoing, Progressing     Problem: Skin Injury Risk Increased  Goal: Skin Health and Integrity  Outcome: Ongoing, Progressing

## 2022-03-21 NOTE — ASSESSMENT & PLAN NOTE
Continue antibiotics    3/18/22 [facility] please take picture of surgical incision today    3/21/22 [facility] continue antibiotics

## 2022-03-21 NOTE — LETTER
March 21, 2022    Rachel Zazueta  [address] [address]      Dear Rachel Zazueta:    Your doctor has referred you to the [facility]. We are sending this letter to remind you to make an appointment with us to complete the referral process.     Please call us at [phone] to schedule an appointment. We look forward to seeing you soon.     If you received a call and have been scheduled, please disregard this letter.       Sincerely,        [facility]   [address] [place] [id number]  [phone]

## 2022-03-21 NOTE — PT/OT/SLP PROGRESS
Physical Therapy         Treatment        Rachel Zazueta   MRN: [id number]     PT Received On: 03/21/22  Total Time (min): 90        Billable Minutes:  Gait [id number], Therapeutic Activity 30 and Therapeutic Exercise 30  Total Minutes: 90  AM Start Time: 1015  AM End Time: 1145  PM Start Time:   PM End Time:  Treatment Type: Individual 45 and Concurrent 45  Treatment Type: Treatment  PT/PTA: PT             General Precautions: Standard,  (back brace on when out of bed)  Orthopedic Precautions:     Braces:           Subjective:  Communicated with nurse prior to session.    Pain/Comfort  Pain Rating 1: 0/10    Objective:  Patient found  In bed, with      Functional Mobility:  Bed Mobility:   Supine to sit: Independent   Sit to supine: Independent   Rolling: Independent   Scooting: Independent    Balance:   Static Sit: GOOD: Takes MODERATE challenges from all directions  Dynamic Sit:  GOOD: Maintains balance through MODERATE excursions of active trunk movement  Static Stand: GOOD: Takes MODERATE challenges from all directions  Dynamic stand: GOOD+: Independent gait (with or without assistive device)    Transfer Training:  Sit to stand:Modified Independent with Rolling Walker     Bed <> Chair:  Step Transfer with Modified Independent with Rolling Walker      Wheelchair Training:  ind with wc mobility  150ft    Gait Training:  amb ind with rw 150ft x 3 attempts  With RW    Stair Training:  Supervision with up and down 12 steps with rail      Additional Treatment:  Pt performed sta`nding and sitting supoorted exs followed by nu step x 15 min at level 2    Activity Tolerance:  Patient tolerated treatment well    Patient left supine with call button in reach.    Assessment:  Rachel Zazueta is a 41 y.o. female with a medical diagnosis of Spondylodiscitis. She presents with ind in every fxnl activity except steps .    Rehab potential is good.    Activity tolerance: Good    Discharge recommendations: Discharge  Facility/Level of Care Needs: home health PT     Equipment recommendations: Equipment Needed After Discharge: shower chair     GOALS:   Multidisciplinary Problems     Physical Therapy Goals     Not on file                PLAN:    Patient to be seen 5 x/week  to address the above listed problems via gait training, wheelchair management/training, therapeutic activities, therapeutic exercises, therapeutic groups, neuromuscular re-education  Plan of Care expires: 03/29/22  Plan of Care reviewed with: spouse         3/21/2022

## 2022-03-21 NOTE — SUBJECTIVE & OBJECTIVE
Interval History: Patient seen and examined.    Review of Systems   Constitutional:  Positive for activity change and appetite change. Negative for chills and fever.   HENT:  Positive for dental problem. Negative for congestion, drooling, ear pain, mouth sores, nosebleeds, postnasal drip, sinus pressure, sore throat and trouble swallowing.    Eyes:  Negative for pain, itching and visual disturbance.   Respiratory:  Positive for shortness of breath. Negative for apnea, cough, chest tightness and wheezing.    Cardiovascular:  Positive for leg swelling. Negative for chest pain and palpitations.   Gastrointestinal:  Positive for constipation. Negative for abdominal distention, abdominal pain, blood in stool, diarrhea, nausea and vomiting.   Endocrine: Positive for cold intolerance and heat intolerance. Negative for polyphagia.   Genitourinary:  Negative for difficulty urinating, dysuria, flank pain, frequency, pelvic pain and vaginal bleeding.   Musculoskeletal:  Positive for back pain and gait problem. Negative for arthralgias and myalgias.   Skin:  Negative for rash.   Neurological:  Positive for weakness. Negative for dizziness, tremors, seizures, syncope, facial asymmetry, speech difficulty and headaches.   Hematological:  Negative for adenopathy.   Psychiatric/Behavioral:  Negative for agitation, confusion and hallucinations. The patient is nervous/anxious.    Objective:     Vital Signs (Most Recent):  Temp: 97.8 °F (36.6 °C) (03/21/22 0630)  Pulse: 88 (03/21/22 0630)  Resp: 18 (03/21/22 1000)  BP: (!) 111/55 (03/21/22 0630)  SpO2: 100 % (03/21/22 0630)   Vital Signs (24h Range):  Temp:  [97.8 °F (36.6 °C)-98 °F (36.7 °C)] 97.8 °F (36.6 °C)  Pulse:  [88-89] 88  Resp:  [18-20] 18  SpO2:  [99 %-100 %] 100 %  BP: (110-111)/(52-55) 111/55     Weight: (!) 144.7 kg (319 lb)  Body mass index is 49.96 kg/m².    Intake/Output Summary (Last 24 hours) at 3/21/2022 1017  Last data filed at 3/21/2022 0800  Gross per 24 hour    Intake 2451 ml   Output 700 ml   Net 1751 ml        Physical Exam  Constitutional:       General: She is awake. She is not in acute distress.     Appearance: Normal appearance. She is not ill-appearing, toxic-appearing or diaphoretic.   HENT:      Head: Normocephalic.   Eyes:      General: No scleral icterus.        Right eye: No discharge.         Left eye: No discharge.      Pupils: Pupils are equal, round, and reactive to light.   Neck:      Thyroid: No thyroid mass or thyromegaly.      Meningeal: Brudzinski's sign and Kernig's sign absent.   Cardiovascular:      Chest Wall: PMI is not displaced. No thrill.      Heart sounds: No murmur heard.  Pulmonary:      Effort: Pulmonary effort is normal. No tachypnea, accessory muscle usage, prolonged expiration or respiratory distress.      Breath sounds: No decreased air movement.   Abdominal:      Palpations: There is no hepatomegaly or splenomegaly.   Musculoskeletal:      Cervical back: No muscular tenderness.   Skin:     Capillary Refill: Capillary refill takes less than 2 seconds.      Coloration: Skin is not cyanotic.      Findings: No petechiae.   Neurological:      Mental Status: She is alert and oriented to person, place, and time.      Cranial Nerves: No dysarthria or facial asymmetry.      Motor: No tremor.   Psychiatric:         Mood and Affect: Affect is angry.         Speech: She is communicative.         Behavior: Behavior is agitated.         Thought Content: Thought content is not paranoid or delusional.         Cognition and Memory: Cognition is not impaired. Memory is not impaired.       Significant Labs: All pertinent labs within the past 24 hours have been reviewed.  Recent Lab Results       None            Significant Imaging: I have reviewed all pertinent imaging results/findings within the past 24 hours.

## 2022-03-21 NOTE — PROGRESS NOTES
[facility] Medicine  Progress Note    Patient Name: Rachel Zazueta  MRN: [id number]  Patient Class: - Rehab   Admission Date: 3/15/2022  Length of Stay: 6 days  Attending Physician: Sai Red III, MD  Primary Care Provider: Dannielle Merion DO        Subjective:     Principal Problem:Spondylodiscitis        HPI:      Patient is a 41-year-old female with a past medical history of hepatitis C cirrhosis of liver anemia and substance abuse.  Patient presented to the clinic in early February for follow-up of a L3 and L4 laminectomy with evacuation of epidural abscess and L3-L5 TLIF and April of 2021.  The patient had worsening weakness and pain and her neurosurgeons diagnosed her with hardware failure and new degeneration of the lower spine.  Patient reportedly had quit IV drug use which had contributed to her original back surgery and epidural abscess but did admit to continuing to use nicotine marijuana and methamphetamine at times.  Patient has severe back pain was unable to ambulate she was having weakness in both legs and was admitted to the acute care facility at the time of the office visit.  Patient's diagnostic testing showed likely infected hardware with failure and she was planned to have a cleanout with fusion.  She was also found to have a kidney stone around the same time had lithotripsy done as well as several ureteral stents that the patient states had become infected and caused complications.  After her lumbar surgery the patient was started on empiric vancomycin and ceftriaxone and was seen by infectious disease.  Psychiatry saw the patient and made several recommendations with the primary being avoid illicit drugs.  Patient's original postop course was slow because of pain psychiatric complications and ultimately she required blood transfusions and several revisions.  Patient has had difficulty with thrombocytopenia recurrent anemia all suspected related to her  chronic liver disease and hepatitis C. The patient was placed on antibiotic regimen for the next 30 days and we will review her chart at the outside facility for permissive cultures.  I have evaluated the patient this morning while doing therapy she seems very motivated and eager to improve.  She has a brace on her lower back and is only complaining of intermittent pain but feels she is getting stronger very rapidly.  The patient did well on the rehab unit sometime last year after a lumbar surgery.  We have encouraged her to avoid illicit substances and drugs and have motivated her.        Overview/Hospital Course:  3/17/22 [facility] patient with complaints of leg swelling with add lasix today and discontinue lidocaine patch due to refusal    3/18/22 [facility] Patient up and ambulating to the gym. Good improvement with lasix no complaints    3/19 [facility], weekend crosscover, vital signs reviewed, previous labs reviewed, chronic leukopenia, continue PT OT efforts   3/20 [facility], weekend crosscover, vitals reviewed, called by nurse, patient had wanted to leave AMA, discussed with patient risks involved with leaving and stopping abx therapy, patient agreed to stay, will discuss with case management in AM about possible home infusion for abx and outpatient therapy    3/21/22 [facility] patient is calm and cooperative, no acute events overnight      Interval History: Patient seen and examined.    Review of Systems   Constitutional:  Positive for activity change and appetite change. Negative for chills and fever.   HENT:  Positive for dental problem. Negative for congestion, drooling, ear pain, mouth sores, nosebleeds, postnasal drip, sinus pressure, sore throat and trouble swallowing.    Eyes:  Negative for pain, itching and visual disturbance.   Respiratory:  Positive for shortness of breath. Negative for apnea, cough, chest tightness and wheezing.    Cardiovascular:  Positive for leg swelling. Negative for chest pain and palpitations.    Gastrointestinal:  Positive for constipation. Negative for abdominal distention, abdominal pain, blood in stool, diarrhea, nausea and vomiting.   Endocrine: Positive for cold intolerance and heat intolerance. Negative for polyphagia.   Genitourinary:  Negative for difficulty urinating, dysuria, flank pain, frequency, pelvic pain and vaginal bleeding.   Musculoskeletal:  Positive for back pain and gait problem. Negative for arthralgias and myalgias.   Skin:  Negative for rash.   Neurological:  Positive for weakness. Negative for dizziness, tremors, seizures, syncope, facial asymmetry, speech difficulty and headaches.   Hematological:  Negative for adenopathy.   Psychiatric/Behavioral:  Negative for agitation, confusion and hallucinations. The patient is nervous/anxious.    Objective:     Vital Signs (Most Recent):  Temp: 97.8 °F (36.6 °C) (03/21/22 0630)  Pulse: 88 (03/21/22 0630)  Resp: 18 (03/21/22 1000)  BP: (!) 111/55 (03/21/22 0630)  SpO2: 100 % (03/21/22 0630)   Vital Signs (24h Range):  Temp:  [97.8 °F (36.6 °C)-98 °F (36.7 °C)] 97.8 °F (36.6 °C)  Pulse:  [88-89] 88  Resp:  [18-20] 18  SpO2:  [99 %-100 %] 100 %  BP: (110-111)/(52-55) 111/55     Weight: (!) 144.7 kg (319 lb)  Body mass index is 49.96 kg/m².    Intake/Output Summary (Last 24 hours) at 3/21/2022 1017  Last data filed at 3/21/2022 0800  Gross per 24 hour   Intake 2451 ml   Output 700 ml   Net 1751 ml        Physical Exam  Constitutional:       General: She is awake. She is not in acute distress.     Appearance: Normal appearance. She is not ill-appearing, toxic-appearing or diaphoretic.   HENT:      Head: Normocephalic.   Eyes:      General: No scleral icterus.        Right eye: No discharge.         Left eye: No discharge.      Pupils: Pupils are equal, round, and reactive to light.   Neck:      Thyroid: No thyroid mass or thyromegaly.      Meningeal: Brudzinski's sign and Kernig's sign absent.   Cardiovascular:      Chest Wall: PMI is not  displaced. No thrill.      Heart sounds: No murmur heard.  Pulmonary:      Effort: Pulmonary effort is normal. No tachypnea, accessory muscle usage, prolonged expiration or respiratory distress.      Breath sounds: No decreased air movement.   Abdominal:      Palpations: There is no hepatomegaly or splenomegaly.   Musculoskeletal:      Cervical back: No muscular tenderness.   Skin:     Capillary Refill: Capillary refill takes less than 2 seconds.      Coloration: Skin is not cyanotic.      Findings: No petechiae.   Neurological:      Mental Status: She is alert and oriented to person, place, and time.      Cranial Nerves: No dysarthria or facial asymmetry.      Motor: No tremor.   Psychiatric:         Mood and Affect: Affect is angry.         Speech: She is communicative.         Behavior: Behavior is agitated.         Thought Content: Thought content is not paranoid or delusional.         Cognition and Memory: Cognition is not impaired. Memory is not impaired.       Significant Labs: All pertinent labs within the past 24 hours have been reviewed.  Recent Lab Results       None            Significant Imaging: I have reviewed all pertinent imaging results/findings within the past 24 hours.      Assessment/Plan:      * Spondylodiscitis  Continue IPR efforts      Edema  Lasix 40 bid    3/18/22 [facility] good diureses with lasix    3/21/22 [facility] good diureses continue meds      Mild episode of recurrent major depressive disorder  Mood stable  -patient upset and wanted to leave, discussed risks of leaving AMA without proper follow up and continuation of care, patient agreed to stay till tomorrow, will need to discuss with case management options    3/21/22 [facility] patient is calm and cooperative      Abscess in epidural space of lumbar spine  Continue antibiotics    3/18/22 [facility] please take picture of surgical incision today    3/21/22 [facility] continue antibiotics      Substance use disorder  Counseled on cessation      Chronic hepatitis  C with cirrhosis  Follow up with GI or hepatology if not already      Cirrhosis of liver without ascites  Stable, monitor labs      Pancytopenia  monitor        VTE Risk Mitigation (From admission, onward)    None          Discharge Planning   SHIRA:      Code Status: Full Code   Is the patient medically ready for discharge?:     Reason for patient still in hospital (select all that apply): Patient trending condition, Laboratory test, Treatment and PT / OT recommendations  Discharge Plan A: Home                  María Huitron NP  Department of Hospital Medicine   [facility] ([facility])

## 2022-03-21 NOTE — ASSESSMENT & PLAN NOTE
Mood stable  -patient upset and wanted to leave, discussed risks of leaving AMA without proper follow up and continuation of care, patient agreed to stay till tomorrow, will need to discuss with case management options    3/21/22 [facility] patient is calm and cooperative

## 2022-03-21 NOTE — PLAN OF CARE
Spoke to Shaq Anaya with pt's insurance plan to confirm home health services available for patient since pt will discharge home with IV abx. Per Shaq,  [facility] and [facility] are the only home healths in network with pt's insurance. CM reached out to both [facility] and [facility] and both companies noted they do not accept pt's insurance. Pt informed and is okay with visiting [facility] clinic in [place] for weekly dressing changes.     Addendum 1533  Order for home IV abx of Ceftriaxone sent to Deion with [facility].

## 2022-03-22 VITALS
BODY MASS INDEX: 45.99 KG/M2 | WEIGHT: 293 LBS | DIASTOLIC BLOOD PRESSURE: 56 MMHG | TEMPERATURE: 97 F | SYSTOLIC BLOOD PRESSURE: 110 MMHG | RESPIRATION RATE: 18 BRPM | HEIGHT: 67 IN | OXYGEN SATURATION: 97 % | HEART RATE: 87 BPM

## 2022-03-22 PROCEDURE — 25000003 PHARM REV CODE 250: Performed by: PHYSICIAN ASSISTANT

## 2022-03-22 PROCEDURE — A4216 STERILE WATER/SALINE, 10 ML: HCPCS | Performed by: INTERNAL MEDICINE

## 2022-03-22 PROCEDURE — 63600175 PHARM REV CODE 636 W HCPCS: Performed by: PHYSICIAN ASSISTANT

## 2022-03-22 PROCEDURE — 25000003 PHARM REV CODE 250: Performed by: INTERNAL MEDICINE

## 2022-03-22 RX ORDER — SERTRALINE HYDROCHLORIDE 50 MG/1
50 TABLET, FILM COATED ORAL DAILY
Qty: 30 TABLET | Refills: 1 | Status: SHIPPED | OUTPATIENT
Start: 2022-03-23 | End: 2022-04-22 | Stop reason: SDUPTHER

## 2022-03-22 RX ORDER — SODIUM CHLORIDE 0.9 % (FLUSH) 0.9 %
10 SYRINGE (ML) INJECTION EVERY 12 HOURS
Start: 2022-03-22 | End: 2022-04-22

## 2022-03-22 RX ORDER — PREGABALIN 75 MG/1
75 CAPSULE ORAL 3 TIMES DAILY
Qty: 90 CAPSULE | Refills: 1 | Status: SHIPPED | OUTPATIENT
Start: 2022-03-22 | End: 2022-04-07 | Stop reason: SDUPTHER

## 2022-03-22 RX ORDER — PANTOPRAZOLE SODIUM 40 MG/1
40 TABLET, DELAYED RELEASE ORAL DAILY
Qty: 30 TABLET | Refills: 1 | Status: SHIPPED | OUTPATIENT
Start: 2022-03-23 | End: 2023-08-23

## 2022-03-22 RX ORDER — SODIUM CHLORIDE 0.9 % (FLUSH) 0.9 %
10 SYRINGE (ML) INJECTION
Start: 2022-03-22 | End: 2022-04-22

## 2022-03-22 RX ORDER — ACETAMINOPHEN 325 MG/1
650 TABLET ORAL EVERY 6 HOURS PRN
Refills: 0 | Status: ON HOLD
Start: 2022-03-22 | End: 2023-01-04 | Stop reason: HOSPADM

## 2022-03-22 RX ORDER — OXYCODONE HYDROCHLORIDE 10 MG/1
10 TABLET ORAL EVERY 4 HOURS PRN
Qty: 45 TABLET | Refills: 0 | Status: SHIPPED | OUTPATIENT
Start: 2022-03-22 | End: 2022-04-07 | Stop reason: SDUPTHER

## 2022-03-22 RX ORDER — MICONAZOLE NITRATE 2 %
POWDER (GRAM) TOPICAL 2 TIMES DAILY
Qty: 85 G | Refills: 0 | Status: SHIPPED | OUTPATIENT
Start: 2022-03-22 | End: 2022-04-22

## 2022-03-22 RX ORDER — FERROUS SULFATE 325(65) MG
325 TABLET, DELAYED RELEASE (ENTERIC COATED) ORAL DAILY
Qty: 30 TABLET | Refills: 1 | Status: SHIPPED | OUTPATIENT
Start: 2022-03-22 | End: 2022-04-22

## 2022-03-22 RX ADMIN — OXYCODONE HYDROCHLORIDE 20 MG: 5 TABLET ORAL at 07:03

## 2022-03-22 RX ADMIN — FERROUS SULFATE TAB 325 MG (65 MG ELEMENTAL FE) 1 EACH: 325 (65 FE) TAB at 08:03

## 2022-03-22 RX ADMIN — Medication 10 ML: at 09:03

## 2022-03-22 RX ADMIN — CEFTRIAXONE 2 G: 2 INJECTION, SOLUTION INTRAVENOUS at 02:03

## 2022-03-22 RX ADMIN — PREGABALIN 75 MG: 75 CAPSULE ORAL at 08:03

## 2022-03-22 RX ADMIN — PANTOPRAZOLE SODIUM 40 MG: 40 TABLET, DELAYED RELEASE ORAL at 08:03

## 2022-03-22 RX ADMIN — SERTRALINE HYDROCHLORIDE 50 MG: 50 TABLET ORAL at 08:03

## 2022-03-22 NOTE — PLAN OF CARE
Problem: Adult Inpatient Plan of Care  Goal: Plan of Care Review  Outcome: Ongoing, Progressing  Goal: Patient-Specific Goal (Individualized)  Outcome: Ongoing, Progressing  Goal: Absence of Hospital-Acquired Illness or Injury  Outcome: Ongoing, Progressing  Goal: Optimal Comfort and Wellbeing  Outcome: Ongoing, Progressing  Goal: Readiness for Transition of Care  Outcome: Ongoing, Progressing     Problem: Bariatric Environmental Safety  Goal: Safety Maintained with Care  Outcome: Ongoing, Progressing     Problem: Infection  Goal: Absence of Infection Signs and Symptoms  Outcome: Ongoing, Progressing     Problem: Impaired Wound Healing  Goal: Optimal Wound Healing  Outcome: Ongoing, Progressing     Problem: Fall Injury Risk  Goal: Absence of Fall and Fall-Related Injury  Outcome: Ongoing, Progressing     Problem: Pain Acute  Goal: Acceptable Pain Control and Functional Ability  Outcome: Ongoing, Progressing     Problem: Skin Injury Risk Increased  Goal: Skin Health and Integrity  Outcome: Ongoing, Progressing

## 2022-03-22 NOTE — PLAN OF CARE
[place] [facility] (Hospital)  Discharge Final Note    Primary Care Provider: Dannielle Merino DO    Expected Discharge Date:     Final Discharge Note (most recent)     Final Note - 03/22/22 0759        Final Note    Assessment Type Final Discharge Note     Anticipated Discharge Disposition Home or Self Care     What phone number can be called within the next 1-3 days to see how you are doing after discharge? [phone]     Hospital Resources/Appts/Education Provided Appointments scheduled and added to AVS;Post-Acute resouces added to AVS        Post-Acute Status    Post-Acute Authorization IV Infusion     IV Infusion Status Set-up Complete/Auth obtained   Notified Deion with [facility] of new referral. Deion will come this morning to educate patient.    Discharge Delays None known at this time               Pt to discharge home today with IV infusions from [facility]. Pt will be on Ceftriaxone 2g/50ml until April 13,2022 per recommendations from Afua Campbell PA-C neurosurgery at [facility]. Order was sent to Deion with [facility] and he will be here this morning to educate patient and set up supplies to be delivered to home. Pt will receive PICC line dressing changes and weekly labs (CBC, CMP, ESR and CPR) at [facility]'s office in [place]. Pt. Notified of the above and she's okay with getting dressing changes and labs at their office. Follow up appointments have been scheduled and pt notified.           Contact Info     Dannielle Merino DO   Specialty: Family Medicine   Relationship: PCP - General    [address] [place] [address]  [address]  [facility] [address]   Phone: [phone]       Next Steps: Follow up on 3/28/2022    Instructions: Follow up appointment with Dr. Merino on 3/28/2022 at 1:15    Margareth Doe DO   Specialty: Infectious Diseases    [address] [place] [id number]   Phone: [phone]       Next Steps: Follow up on 3/25/2022    Instructions:  Follow up with Dr. Doe on 3/25/2022 at 9:00    Rao Bellamy    [address] [place] [address]       Next Steps: Follow up on 3/23/2022    Instructions: Follow up appointment with Dr. Rao Bellamy on 3/23/2022 at 2:00 at [facility]

## 2022-03-22 NOTE — PT/OT/SLP DISCHARGE
Occupational Therapy Discharge Summary    Rachel Zazueta  MRN: [id number]   Principal Problem: Spondylodiscitis      Patient Discharged from inpatient Occupational Therapy on 03/22/22.  Please refer to prior OT note dated 03/21/22 for functional status.    Assessment:     Pt was cooperative and motivated during skilled OT treatment sessions including ADL retraining, functional transfer retraining, assistive device / adaptive equipment training, therapeutic activity, therapeutic exercise, and patient education including discharge planning and home exercise program allowing her to progress with functional goals in which she achieved 8/8 STG's and 9/9 LTG's. Pt now modified independent with ADL's including functional mobility with extra time and use of adaptive equipment / assistive devices while sustaining compliance with Spinal Precautions.      Objective:     GOALS:   Short Term Goals to be met by: 03/22/22      Patient will increase functional independence with ADLs by performing:     UE Dressing with Supervision. MET  LE Dressing with Moderate Assistance. MET  Grooming while standing at sink with Supervision. MET  Toileting from toilet with Minimal Assistance for hygiene and clothing management. MET  Bathing from  shower chair/bench with Minimal Assistance. MET  Step transfer with Stand-by Assistance. MET  Toilet transfer to toilet with Stand-by Assistance. MET  Increased functional strength to 4/5 for bilateral UE's. MET     Long Term Goals to be met by: 03/29/22      Patient will increase functional independence with ADLs by performing:     Feeding with [place]. MET  UE Dressing with Modified [place]. MET  LE Dressing with Supervision. MET  Grooming while standing at sink with Modified [place]. MET  Toileting from toilet with Supervision for hygiene and clothing management. MET  Bathing from  shower chair/bench with Supervision. MET  Step transfer with Modified [place]. MET  Toilet transfer  to toilet with Modified [place]. MET  Increased functional strength to 4+/5 for bilateral UE's. MET    Reasons for Discontinuation of Therapy Services  Satisfactory goal achievement.      Plan:     Patient Discharged to: Home with Home Health Service    3/22/2022

## 2022-03-22 NOTE — PLAN OF CARE
Problem: Adult Inpatient Plan of Care  Goal: Plan of Care Review  3/22/2022 0318 by Kieran Orr RN  Outcome: Ongoing, Progressing  3/22/2022 0317 by Kieran Orr RN  Outcome: Ongoing, Progressing  Goal: Patient-Specific Goal (Individualized)  3/22/2022 0318 by Kieran Orr RN  Outcome: Ongoing, Progressing  3/22/2022 0317 by Kieran Orr RN  Outcome: Ongoing, Progressing  Goal: Absence of Hospital-Acquired Illness or Injury  3/22/2022 0318 by Kieran Orr RN  Outcome: Ongoing, Progressing  3/22/2022 0317 by Kieran Orr RN  Outcome: Ongoing, Progressing  Goal: Optimal Comfort and Wellbeing  3/22/2022 0318 by Kieran Orr RN  Outcome: Ongoing, Progressing  3/22/2022 0317 by Kieran Orr RN  Outcome: Ongoing, Progressing  Goal: Readiness for Transition of Care  3/22/2022 0318 by Kieran Orr RN  Outcome: Ongoing, Progressing  3/22/2022 0317 by Kieran Orr RN  Outcome: Ongoing, Progressing     Problem: Bariatric Environmental Safety  Goal: Safety Maintained with Care  3/22/2022 0318 by Kieran Orr RN  Outcome: Ongoing, Progressing  3/22/2022 0317 by Kieran Orr RN  Outcome: Ongoing, Progressing     Problem: Infection  Goal: Absence of Infection Signs and Symptoms  3/22/2022 0318 by Kieran Orr RN  Outcome: Ongoing, Progressing  3/22/2022 0317 by Kieran Orr RN  Outcome: Ongoing, Progressing     Problem: Impaired Wound Healing  Goal: Optimal Wound Healing  3/22/2022 0318 by Kieran Orr RN  Outcome: Ongoing, Progressing  3/22/2022 0317 by Kieran Orr RN  Outcome: Ongoing, Progressing     Problem: Fall Injury Risk  Goal: Absence of Fall and Fall-Related Injury  3/22/2022 0318 by Kieran Orr RN  Outcome: Ongoing, Progressing  3/22/2022 0317 by Kieran Orr RN  Outcome: Ongoing, Progressing     Problem: Pain Acute  Goal: Acceptable Pain Control and Functional Ability  3/22/2022 0318 by Kieran Orr RN  Outcome: Ongoing, Progressing  3/22/2022 0317 by Kieran Orr RN  Outcome: Ongoing, Progressing     Problem: Skin Injury Risk  Increased  Goal: Skin Health and Integrity  3/22/2022 0318 by Kieran Orr RN  Outcome: Ongoing, Progressing  3/22/2022 0317 by Kieran Orr RN  Outcome: Ongoing, Progressing    Patient verbalizes eagerness to discharge home tomorrow. Education on PICC line performed.  Patient verbalized understanding.  Also educated patient on importance of wearing brace when out of bed.  Patient noncompliant at times.  Patient verbalized understanding.  Patient with no complaints and in NAD. Will continue to monitor.

## 2022-03-22 NOTE — NURSING
Report received from SHARONA Dodge.  Agree with patient assessment. Patient sleeping with bed in locked lowest position with call bell and all personal belongings within reach. Patient in NAD. Will continue to monitor.

## 2022-03-22 NOTE — PT/OT/SLP DISCHARGE
Physical Therapy Discharge Summary    Name: Rachel Zazueta  MRN: [id number]   Principal Problem: Spondylodiscitis     Patient Discharged from acute Physical Therapy on 03-22-22.  Please refer to prior PT noted date on 03-21-22 for functional status.     Assessment:     Pt was seen for bed mobility,transfer,gait,and strength training. She met all goals except for steps goal    Objective:     GOALS:   STGs  1. Pt will be sba with bed mobiity-met  2. Pt will be sba with transfers-met  3. Pt will amb with rw 150ft sba- met  4 ind with wc 150ft sba-met  5 up and down 12 steps with rail with sba  LTGs -met  1. Pt will be ind with rw 150ft -met  2..pt will be ind with bed mobility-met  3. Pt will be ind with transfers-met  4. Pt will be ind with up and down 12 steps with rail -not met       Reasons for Discontinuation of Therapy Services  Pt met her goals    Plan:     Patient Discharged to: home with home health      3/22/2022

## 2022-03-22 NOTE — NURSING
Patient dc home today. I went over dc paperwork with patient voicing understanding. All patient's belongings were packed and sent home with patient. Patient requested to go to the 6th floor because a friend was admitted last night. Patient stated her ride was going to come get her on the 6th floor. Patient left in stable condition.

## 2022-03-23 ENCOUNTER — TELEPHONE (OUTPATIENT)
Dept: HEMATOLOGY/ONCOLOGY | Facility: CLINIC | Age: 42
End: 2022-03-23
Payer: MEDICAID

## 2022-03-23 ENCOUNTER — OFFICE VISIT (OUTPATIENT)
Dept: HEMATOLOGY/ONCOLOGY | Facility: CLINIC | Age: 42
End: 2022-03-23
Payer: MEDICAID

## 2022-03-23 VITALS
HEIGHT: 67 IN | OXYGEN SATURATION: 99 % | DIASTOLIC BLOOD PRESSURE: 68 MMHG | BODY MASS INDEX: 44.4 KG/M2 | SYSTOLIC BLOOD PRESSURE: 127 MMHG | WEIGHT: 282.88 LBS | HEART RATE: 107 BPM

## 2022-03-23 DIAGNOSIS — K74.60 CHRONIC HEPATITIS C WITH CIRRHOSIS: ICD-10-CM

## 2022-03-23 DIAGNOSIS — D61.818 PANCYTOPENIA: ICD-10-CM

## 2022-03-23 DIAGNOSIS — B18.2 CHRONIC HEPATITIS C WITH CIRRHOSIS: ICD-10-CM

## 2022-03-23 DIAGNOSIS — G06.2 EPIDURAL ABSCESS: ICD-10-CM

## 2022-03-23 DIAGNOSIS — K74.60 CIRRHOSIS OF LIVER WITHOUT ASCITES, UNSPECIFIED HEPATIC CIRRHOSIS TYPE: Primary | ICD-10-CM

## 2022-03-23 PROCEDURE — 99204 PR OFFICE/OUTPT VISIT, NEW, LEVL IV, 45-59 MIN: ICD-10-PCS | Mod: S$PBB,,, | Performed by: INTERNAL MEDICINE

## 2022-03-23 PROCEDURE — 99999 PR PBB SHADOW E&M-EST. PATIENT-LVL IV: CPT | Mod: PBBFAC,,, | Performed by: INTERNAL MEDICINE

## 2022-03-23 PROCEDURE — 99999 PR PBB SHADOW E&M-EST. PATIENT-LVL IV: ICD-10-PCS | Mod: PBBFAC,,, | Performed by: INTERNAL MEDICINE

## 2022-03-23 PROCEDURE — 3078F PR MOST RECENT DIASTOLIC BLOOD PRESSURE < 80 MM HG: ICD-10-PCS | Mod: CPTII,,, | Performed by: INTERNAL MEDICINE

## 2022-03-23 PROCEDURE — 1159F MED LIST DOCD IN RCRD: CPT | Mod: CPTII,,, | Performed by: INTERNAL MEDICINE

## 2022-03-23 PROCEDURE — 99204 OFFICE O/P NEW MOD 45 MIN: CPT | Mod: S$PBB,,, | Performed by: INTERNAL MEDICINE

## 2022-03-23 PROCEDURE — 3078F DIAST BP <80 MM HG: CPT | Mod: CPTII,,, | Performed by: INTERNAL MEDICINE

## 2022-03-23 PROCEDURE — 1160F PR REVIEW ALL MEDS BY PRESCRIBER/CLIN PHARMACIST DOCUMENTED: ICD-10-PCS | Mod: CPTII,,, | Performed by: INTERNAL MEDICINE

## 2022-03-23 PROCEDURE — 3008F PR BODY MASS INDEX (BMI) DOCUMENTED: ICD-10-PCS | Mod: CPTII,,, | Performed by: INTERNAL MEDICINE

## 2022-03-23 PROCEDURE — 3008F BODY MASS INDEX DOCD: CPT | Mod: CPTII,,, | Performed by: INTERNAL MEDICINE

## 2022-03-23 PROCEDURE — 99214 OFFICE O/P EST MOD 30 MIN: CPT | Mod: PBBFAC,PN | Performed by: INTERNAL MEDICINE

## 2022-03-23 PROCEDURE — 1111F PR DISCHARGE MEDS RECONCILED W/ CURRENT OUTPATIENT MED LIST: ICD-10-PCS | Mod: CPTII,,, | Performed by: INTERNAL MEDICINE

## 2022-03-23 PROCEDURE — 1160F RVW MEDS BY RX/DR IN RCRD: CPT | Mod: CPTII,,, | Performed by: INTERNAL MEDICINE

## 2022-03-23 PROCEDURE — 3074F PR MOST RECENT SYSTOLIC BLOOD PRESSURE < 130 MM HG: ICD-10-PCS | Mod: CPTII,,, | Performed by: INTERNAL MEDICINE

## 2022-03-23 PROCEDURE — 1111F DSCHRG MED/CURRENT MED MERGE: CPT | Mod: CPTII,,, | Performed by: INTERNAL MEDICINE

## 2022-03-23 PROCEDURE — 3074F SYST BP LT 130 MM HG: CPT | Mod: CPTII,,, | Performed by: INTERNAL MEDICINE

## 2022-03-23 PROCEDURE — 1159F PR MEDICATION LIST DOCUMENTED IN MEDICAL RECORD: ICD-10-PCS | Mod: CPTII,,, | Performed by: INTERNAL MEDICINE

## 2022-03-23 NOTE — TELEPHONE ENCOUNTER
----- Message from Bren Grossman RN sent at 3/23/2022  2:42 PM CDT -----    ----- Message -----  From: Steve Roman  Sent: 3/23/2022   1:57 PM CDT  To: Mia Yeh Staff    Type:  Patient Returning Call    Who Called:Pt   Would the patient rather a call back or a response via [facility]? Call   Best Call Back Number:[phone]  Additional Information:     Running late for appt

## 2022-03-23 NOTE — PROGRESS NOTES
"PATIENT: Rachel Zazueta  MRN: [id number]  DATE: 3/23/2022    Chief Complaint: Pancytopenia      Subjective:     History of Present Illness:     Referred for evaluation/management of pancytopenia    She has untreated hep C, liver cirrhosis with portal hypertension.    Has chronic pancytopenia at least since 2016    Had bone marrow biopsy in March 2017 (Dr. Mcgovern) which was unremarkable    She was recently in the hospital for a back infection, he is on IV antibiotics, has a PICC line    PCP is in [place]    In the process of being scheduled to see GI/hepatology close to home for management of Hep C    Past Medical, Surgical, Family and Social History Reviewed.    Medications and Allergies reviewed    Review of Systems   Constitutional: Positive for appetite change, fatigue and unexpected weight change.   HENT: Negative for mouth sores.    Eyes: Negative for visual disturbance.   Respiratory: Negative for cough and shortness of breath.    Cardiovascular: Negative for chest pain.   Gastrointestinal: Negative for abdominal pain and diarrhea.   Genitourinary: Negative for frequency.   Musculoskeletal: Positive for back pain.   Skin: Negative for rash.   Neurological: Negative for headaches.   Hematological: Negative for adenopathy.   Psychiatric/Behavioral: The patient is nervous/anxious.        Objective:     Vitals:    03/23/22 1512   BP: 127/68   BP Location: Left arm   Patient Position: Sitting   BP Method: Large (Automatic)   Pulse: 107   SpO2: 99%   Weight: 128.3 kg (282 lb 13.6 oz)   Height: 5' 7" (1.702 m)     BMI: Body mass index is 44.3 kg/m².    Physical Exam  Vitals and nursing note reviewed.   Constitutional:       General: She is not in acute distress.  Pulmonary:      Effort: Pulmonary effort is normal.      Breath sounds: Normal breath sounds.   Neurological:      Mental Status: She is alert. Mental status is at baseline.       Assessment:       1. Cirrhosis of liver without ascites, unspecified " hepatic cirrhosis type    2. Pancytopenia    3. Chronic hepatitis C with cirrhosis    4. Epidural abscess      Plan:   Chronic pancytopenia  -secondary to history of liver cirrhosis, portal hypertension, hepatitis-C  -pancytopenia tends to worsen with antibiotics and infection  -currently pancytopenia is at baseline  -no indication to pursue bone marrow biopsy at this time  -reviewed recent and prior CBCs extensively, in detail with the patient  -encouraged follow-up with gastroenterology and hepatology for management of liver cirrhosis and hepatitis-C    All questions answered

## 2022-03-24 ENCOUNTER — PATIENT OUTREACH (OUTPATIENT)
Dept: ADMINISTRATIVE | Facility: OTHER | Age: 42
End: 2022-03-24
Payer: MEDICAID

## 2022-03-24 LAB
FUNGUS SPEC CULT: NORMAL
FUNGUS SPEC CULT: NORMAL

## 2022-03-24 NOTE — PROGRESS NOTES
Health Maintenance Due   Topic Date Due    Cervical Cancer Screening  Never done    Mammogram  Never done    Pneumococcal Vaccines (Age 0-64) (1 of 2 - PPSV23) 11/30/2018    COVID-19 Vaccine (2 - Pfizer 3-dose series) 05/21/2021     Updates were requested from care everywhere.  Chart was reviewed for overdue Proactive [facility] Encounters ([facility]) topics (CRS, Breast Cancer Screening, Eye exam)  Health Maintenance has been updated.  LINKS immunization registry triggered.  Immunizations were reconciled.

## 2022-03-25 ENCOUNTER — PATIENT MESSAGE (OUTPATIENT)
Dept: NEUROSURGERY | Facility: CLINIC | Age: 42
End: 2022-03-25
Payer: MEDICAID

## 2022-03-25 ENCOUNTER — TELEPHONE (OUTPATIENT)
Dept: INFECTIOUS DISEASES | Facility: CLINIC | Age: 42
End: 2022-03-25
Payer: MEDICAID

## 2022-03-25 NOTE — ASSESSMENT & PLAN NOTE
Mood stable  -patient upset and wanted to leave, discussed risks of leaving AMA without proper follow up and continuation of care, patient agreed to stay till tomorrow, will need to discuss with case management options    3/21/22 [facility] patient is calm and cooperative

## 2022-03-25 NOTE — ASSESSMENT & PLAN NOTE
Continue antibiotics    3/18/22 [facility] please take picture of surgical incision today    3/21/22 [facility] continue antibiotics

## 2022-03-25 NOTE — TELEPHONE ENCOUNTER
----- Message from Sulma Eid sent at 3/25/2022  8:11 AM CDT -----  Contact: pt  Pt requesting call back re: r/s appt for today @ 9.    Confirmed contact below:  Contact Name:Rachel Marbin  Phone Number: [phone]

## 2022-03-25 NOTE — DISCHARGE SUMMARY
[facility] Medicine  Discharge Summary      Patient Name: Rachel Zazueta  MRN: [id number]  Patient Class: IP- Rehab  Admission Date: 3/15/2022  Hospital Length of Stay: 7 days  Discharge Date and Time: 3/22/2022 10:24 AM  Attending Physician: Sylvie att. providers found   Discharging Provider: Sai Red III, MD  Primary Care Provider: Dannielle Merino DO      HPI:       Patient is a 41-year-old female with a past medical history of hepatitis C cirrhosis of liver anemia and substance abuse.  Patient presented to the clinic in early February for follow-up of a L3 and L4 laminectomy with evacuation of epidural abscess and L3-L5 TLIF and April of 2021.  The patient had worsening weakness and pain and her neurosurgeons diagnosed her with hardware failure and new degeneration of the lower spine.  Patient reportedly had quit IV drug use which had contributed to her original back surgery and epidural abscess but did admit to continuing to use nicotine marijuana and methamphetamine at times.  Patient has severe back pain was unable to ambulate she was having weakness in both legs and was admitted to the acute care facility at the time of the office visit.  Patient's diagnostic testing showed likely infected hardware with failure and she was planned to have a cleanout with fusion.  She was also found to have a kidney stone around the same time had lithotripsy done as well as several ureteral stents that the patient states had become infected and caused complications.  After her lumbar surgery the patient was started on empiric vancomycin and ceftriaxone and was seen by infectious disease.  Psychiatry saw the patient and made several recommendations with the primary being avoid illicit drugs.  Patient's original postop course was slow because of pain psychiatric complications and ultimately she required blood transfusions and several revisions.  Patient has had difficulty with thrombocytopenia  recurrent anemia all suspected related to her chronic liver disease and hepatitis C. The patient was placed on antibiotic regimen for the next 30 days and we will review her chart at the outside facility for permissive cultures.  I have evaluated the patient this morning while doing therapy she seems very motivated and eager to improve.  She has a brace on her lower back and is only complaining of intermittent pain but feels she is getting stronger very rapidly.  The patient did well on the rehab unit sometime last year after a lumbar surgery.  We have encouraged her to avoid illicit substances and drugs and have motivated her.        * No surgery found *      Hospital Course:   3/17/22 [facility] patient with complaints of leg swelling with add lasix today and discontinue lidocaine patch due to refusal  3/18/22 [facility] Patient up and ambulating to the gym. Good improvement with lasix no complaints    3/19 [facility], weekend crosscover, vital signs reviewed, previous labs reviewed, chronic leukopenia, continue PT OT efforts   3/20 [facility], weekend crosscover, vitals reviewed, called by nurse, patient had wanted to leave AMA, discussed with patient risks involved with leaving and stopping abx therapy, patient agreed to stay, will discuss with case management in AM about possible home infusion for abx and outpatient therapy  3/21/22 [facility] patient is calm and cooperative, no acute events overnight    Discharge Note:  Patient requesting dc home early, we encouraged to stay, encouraged to continue healthy living and avoid all illicit substances, please keep fu with setup appts.  Patient did excellent with her rehabilitative efforts and I am hoping she continues efforts.       Goals of Care Treatment Preferences:  Code Status: Full Code      Consults:   Consults (From admission, onward)        Status Ordering Provider     Inpatient consult to Registered Dietitian/Nutritionist  Once        Provider:  (Not yet assigned)    Completed SOULEYMANE  KAYLEN MILLAN          * Spondylodiscitis  Continue rehab efforts      Edema  Lasix 40 bid    3/18/22 [facility] good diureses with lasix    3/21/22 [facility] good diureses continue meds      Mild episode of recurrent major depressive disorder  Mood stable  -patient upset and wanted to leave, discussed risks of leaving AMA without proper follow up and continuation of care, patient agreed to stay till tomorrow, will need to discuss with case management options    3/21/22 [facility] patient is calm and cooperative      Abscess in epidural space of lumbar spine  Continue antibiotics    3/18/22 [facility] please take picture of surgical incision today    3/21/22 [facility] continue antibiotics      Substance use disorder  Counseled on cessation      Chronic hepatitis C with cirrhosis  Follow up with GI or hepatology if not already      Cirrhosis of liver without ascites  Stable, monitor labs      Pancytopenia  monitor        Final Active Diagnoses:    Diagnosis Date Noted POA    PRINCIPAL PROBLEM:  Spondylodiscitis [M46.40] 04/14/2021 Yes    Edema [R60.9] 03/17/2022 Yes    Mild episode of recurrent major depressive disorder [F33.0] 02/09/2022 Yes     Chronic    Abscess in epidural space of lumbar spine [G06.1] 04/14/2021 Yes    Substance use disorder [F19.90] 03/15/2017 Yes    Chronic hepatitis C with cirrhosis [B18.2, K74.60] 01/08/2017 Yes    Cirrhosis of liver without ascites [K74.60] 12/06/2016 Yes    Pancytopenia [D61.818] 03/04/2015 Yes      Problems Resolved During this Admission:       Discharged Condition: fair    Disposition: Home or Self Care    Follow Up:   Follow-up Information     Dannielle Merino DO Follow up on 3/28/2022.    Specialty: Family Medicine  Why: Follow up appointment with Dr. Merino on 3/28/2022 at 1:15  Contact information:  [facility]   [address] [address]  [phone]             Margareth Doe, DO Follow up on 3/25/2022.    Specialty: Infectious Diseases  Why: Follow up with Dr. Doe on  3/25/2022 at 9:00  Contact information:  [address]  [facility] [address]  [phone]             Rao Bellamy Follow up on 3/23/2022.    Why: Follow up appointment with Dr. Rao Bellamy on 3/23/2022 at 2:00 at [facility]  Contact information:  [address], [place] [address]                     Patient Instructions:      Diet Cardiac     Activity as tolerated       Significant Diagnostic Studies: Noted.    Pending Diagnostic Studies:     None         Medications:  Reconciled Home Medications:      Medication List      START taking these medications    acetaminophen 325 MG tablet  Commonly known as: TYLENOL  Take 2 tablets (650 mg total) by mouth every 6 (six) hours as needed for Temperature greater than (100.4 F).     miconazole NITRATE 2 % 2 % top powder  Commonly known as: MICOTIN  Apply topically 2 (two) times daily.     pantoprazole 40 MG tablet  Commonly known as: PROTONIX  Take 1 tablet (40 mg total) by mouth once daily.     * sodium chloride 0.9% injection  Commonly known as: NORMAL SALINE FLUSH  Inject 10 mLs into the vein every 12 (twelve) hours.     * sodium chloride 0.9% injection  Commonly known as: NORMAL SALINE FLUSH  Inject 10 mLs into the vein as needed.         * This list has 2 medication(s) that are the same as other medications prescribed for you. Read the directions carefully, and ask your doctor or other care provider to review them with you.            CHANGE how you take these medications    oxyCODONE 10 mg Tab immediate release tablet  Commonly known as: ROXICODONE  Take 1 tablet (10 mg total) by mouth every 4 (four) hours as needed for Pain.  What changed:   · medication strength  · how much to take     sertraline 50 MG tablet  Commonly known as: ZOLOFT  Take 1 tablet (50 mg total) by mouth once daily.  What changed:   · medication strength  · how much to take        CONTINUE taking these medications    cefTRIAXone 2 g/50 mL Pgbk  IVPB  Commonly known as: ROCEPHIN  Inject 50 mLs (2 g total) into the vein every 12 (twelve) hours.     ferrous sulfate 325 (65 FE) MG EC tablet  Take 1 tablet (325 mg total) by mouth once daily.     polyethylene glycol 17 gram/dose powder  Commonly known as: GLYCOLAX  Take 17 g by mouth once daily.     pregabalin 75 MG capsule  Commonly known as: LYRICA  Take 1 capsule (75 mg total) by mouth 3 (three) times daily.        ASK your doctor about these medications    diclofenac sodium 1 % Gel  Commonly known as: VOLTAREN  Apply 2 g topically 4 (four) times daily.            Indwelling Lines/Drains at time of discharge:   Lines/Drains/Airways     Peripherally Inserted Central Catheter Line  Duration           PICC Double Lumen 02/22/22 1110 right basilic 30 days                Time spent on the discharge of patient: 45 minutes         Sai Red III, MD  Department of Hospital Medicine  [facility] ([facility])

## 2022-03-25 NOTE — TELEPHONE ENCOUNTER
Called patient back and she stated she cannot make it today. Rescheduled appointment for 04/01 at 10:30am. Patient agreed to appointment date/time.

## 2022-03-28 ENCOUNTER — TELEPHONE (OUTPATIENT)
Dept: PRIMARY CARE CLINIC | Facility: CLINIC | Age: 42
End: 2022-03-28
Payer: MEDICAID

## 2022-03-28 ENCOUNTER — TELEPHONE (OUTPATIENT)
Dept: HEPATOLOGY | Facility: CLINIC | Age: 42
End: 2022-03-28
Payer: MEDICAID

## 2022-03-28 NOTE — TELEPHONE ENCOUNTER
----- Message from Veronica Jayna sent at 3/21/2022  4:11 PM CDT -----  Regarding: referral  Pt records reviewed.  Pt will be referred to Hepatitis C Clinic due to HEP C     Chronic hepatitis C with cirrhosis  [facility] cirrhosis 2/2 HCV. Per notes: Pt denies ever receiving treatment for HCV. She has been referred to hepatology previously but did not follow up.   MELD-Na score: 10 at 3/11/2022         Initial referral received from DR. Margareth Elizabeth MD  Referral letter sent to provider and patient.

## 2022-03-28 NOTE — TELEPHONE ENCOUNTER
Attempt made to reach patient to setup consult visit with PA Scheuermann.  LVM asking that she call hepatology for scheduling.

## 2022-03-30 LAB — FUNGUS SPEC CULT: NORMAL

## 2022-04-04 NOTE — TELEPHONE ENCOUNTER
Attempt made to reach patient on 3/29/22; LVM asking that she call hepatology back for scheduling.  Attempt made to reach her again today.  Unable to LVM so letter sent asking that she call hepatology for scheduling.

## 2022-04-05 LAB
ACID FAST MOD KINY STN SPEC: NORMAL
MYCOBACTERIUM SPEC QL CULT: NORMAL

## 2022-04-06 ENCOUNTER — PATIENT MESSAGE (OUTPATIENT)
Dept: NEUROSURGERY | Facility: CLINIC | Age: 42
End: 2022-04-06
Payer: MEDICAID

## 2022-04-07 ENCOUNTER — PATIENT MESSAGE (OUTPATIENT)
Dept: NEUROSURGERY | Facility: CLINIC | Age: 42
End: 2022-04-07
Payer: MEDICAID

## 2022-04-07 ENCOUNTER — TELEPHONE (OUTPATIENT)
Dept: INFECTIOUS DISEASES | Facility: CLINIC | Age: 42
End: 2022-04-07
Payer: MEDICAID

## 2022-04-07 NOTE — TELEPHONE ENCOUNTER
Called Dewey with [employer] to request weekly labs.   She no show to multiple labs/dressing change appointments.  Dewey called pt, she told Dewey her PICC came out about 2 days ago.  Pt did not informed HH. Infusion company or ID provider.

## 2022-04-09 ENCOUNTER — HOSPITAL ENCOUNTER (EMERGENCY)
Facility: HOSPITAL | Age: 42
Discharge: HOME | End: 2022-04-09
Attending: EMERGENCY MEDICINE
Payer: MEDICAID

## 2022-04-09 VITALS
HEIGHT: 67 IN | RESPIRATION RATE: 16 BRPM | OXYGEN SATURATION: 99 % | DIASTOLIC BLOOD PRESSURE: 59 MMHG | HEART RATE: 106 BPM | BODY MASS INDEX: 40.81 KG/M2 | WEIGHT: 260 LBS | SYSTOLIC BLOOD PRESSURE: 128 MMHG | TEMPERATURE: 98 F

## 2022-04-09 DIAGNOSIS — R10.9 RIGHT FLANK PAIN: Primary | ICD-10-CM

## 2022-04-09 DIAGNOSIS — K74.60 HEPATIC CIRRHOSIS, UNSPECIFIED HEPATIC CIRRHOSIS TYPE, UNSPECIFIED WHETHER ASCITES PRESENT: ICD-10-CM

## 2022-04-09 DIAGNOSIS — K86.1 CHRONIC PANCREATITIS, UNSPECIFIED PANCREATITIS TYPE: ICD-10-CM

## 2022-04-09 LAB
ALBUMIN SERPL BCP-MCNC: 2.3 G/DL (ref 3.5–5.2)
ALP SERPL-CCNC: 154 U/L (ref 55–135)
ALT SERPL W/O P-5'-P-CCNC: 23 U/L (ref 10–44)
ANION GAP SERPL CALC-SCNC: 4 MMOL/L (ref 8–16)
AST SERPL-CCNC: 57 U/L (ref 10–40)
BACTERIA #/AREA URNS HPF: NEGATIVE /HPF
BASOPHILS # BLD AUTO: ABNORMAL K/UL (ref 0–0.2)
BASOPHILS NFR BLD: 3 % (ref 0–1.9)
BILIRUB SERPL-MCNC: 1.2 MG/DL (ref 0.1–1)
BILIRUB UR QL STRIP: ABNORMAL
BUN SERPL-MCNC: 10 MG/DL (ref 6–20)
CALCIUM SERPL-MCNC: 8.2 MG/DL (ref 8.7–10.5)
CHLORIDE SERPL-SCNC: 107 MMOL/L (ref 95–110)
CLARITY UR: ABNORMAL
CO2 SERPL-SCNC: 31 MMOL/L (ref 23–29)
COLOR UR: YELLOW
CREAT SERPL-MCNC: 0.6 MG/DL (ref 0.5–1.4)
DIFFERENTIAL METHOD: ABNORMAL
EOSINOPHIL # BLD AUTO: ABNORMAL K/UL (ref 0–0.5)
EOSINOPHIL NFR BLD: 4 % (ref 0–8)
ERYTHROCYTE [DISTWIDTH] IN BLOOD BY AUTOMATED COUNT: 15.9 % (ref 11.5–14.5)
EST. GFR  (AFRICAN AMERICAN): >60 ML/MIN/1.73 M^2
EST. GFR  (NON AFRICAN AMERICAN): >60 ML/MIN/1.73 M^2
GLUCOSE SERPL-MCNC: 113 MG/DL (ref 70–110)
GLUCOSE UR QL STRIP: NEGATIVE
HCT VFR BLD AUTO: 28.1 % (ref 37–48.5)
HGB BLD-MCNC: 8.9 G/DL (ref 12–16)
HGB UR QL STRIP: ABNORMAL
HYALINE CASTS #/AREA URNS LPF: 0 /LPF
IMM GRANULOCYTES # BLD AUTO: ABNORMAL K/UL (ref 0–0.04)
IMM GRANULOCYTES NFR BLD AUTO: ABNORMAL % (ref 0–0.5)
KETONES UR QL STRIP: ABNORMAL
LEUKOCYTE ESTERASE UR QL STRIP: ABNORMAL
LIPASE SERPL-CCNC: 105 U/L (ref 23–300)
LYMPHOCYTES # BLD AUTO: ABNORMAL K/UL (ref 1–4.8)
LYMPHOCYTES NFR BLD: 19 % (ref 18–48)
MCH RBC QN AUTO: 28.2 PG (ref 27–31)
MCHC RBC AUTO-ENTMCNC: 31.7 G/DL (ref 32–36)
MCV RBC AUTO: 89 FL (ref 82–98)
MICROSCOPIC COMMENT: ABNORMAL
MONOCYTES # BLD AUTO: ABNORMAL K/UL (ref 0.3–1)
MONOCYTES NFR BLD: 0 % (ref 4–15)
NEUTROPHILS NFR BLD: 74 % (ref 38–73)
NITRITE UR QL STRIP: NEGATIVE
NRBC BLD-RTO: 0 /100 WBC
PH UR STRIP: 6 [PH] (ref 5–8)
PLATELET # BLD AUTO: 62 K/UL (ref 150–450)
PMV BLD AUTO: 9.1 FL (ref 9.2–12.9)
POTASSIUM SERPL-SCNC: 3.6 MMOL/L (ref 3.5–5.1)
PROT SERPL-MCNC: 6.6 G/DL (ref 6–8.4)
PROT UR QL STRIP: NEGATIVE
RBC # BLD AUTO: 3.16 M/UL (ref 4–5.4)
RBC #/AREA URNS HPF: 16 /HPF (ref 0–4)
SODIUM SERPL-SCNC: 142 MMOL/L (ref 136–145)
SP GR UR STRIP: 1.02 (ref 1–1.03)
SQUAMOUS #/AREA URNS HPF: 2 /HPF
URN SPEC COLLECT METH UR: ABNORMAL
UROBILINOGEN UR STRIP-ACNC: ABNORMAL EU/DL
WBC # BLD AUTO: 1.8 K/UL (ref 3.9–12.7)
WBC #/AREA URNS HPF: 2 /HPF (ref 0–5)

## 2022-04-09 PROCEDURE — 36415 COLL VENOUS BLD VENIPUNCTURE: CPT | Performed by: NURSE PRACTITIONER

## 2022-04-09 PROCEDURE — 83690 ASSAY OF LIPASE: CPT | Performed by: EMERGENCY MEDICINE

## 2022-04-09 PROCEDURE — 81000 URINALYSIS NONAUTO W/SCOPE: CPT | Performed by: EMERGENCY MEDICINE

## 2022-04-09 PROCEDURE — 85007 BL SMEAR W/DIFF WBC COUNT: CPT | Performed by: NURSE PRACTITIONER

## 2022-04-09 PROCEDURE — 85027 COMPLETE CBC AUTOMATED: CPT | Performed by: NURSE PRACTITIONER

## 2022-04-09 PROCEDURE — 99284 EMERGENCY DEPT VISIT MOD MDM: CPT | Mod: 25

## 2022-04-09 PROCEDURE — 36415 COLL VENOUS BLD VENIPUNCTURE: CPT | Performed by: EMERGENCY MEDICINE

## 2022-04-09 PROCEDURE — 80053 COMPREHEN METABOLIC PANEL: CPT | Performed by: NURSE PRACTITIONER

## 2022-04-09 NOTE — ED NOTES
Pt stated that she is suppose to be receiving antibiotics via PICC line post back sx however was accidentally removed while sleeping a couple days ago so has not been receiving antibiotic treatment.

## 2022-04-09 NOTE — ED NOTES
PT informs that her ride si working and will not be able to come get her until he gets off, charge nurse notified

## 2022-04-09 NOTE — ED PROVIDER NOTES
"Encounter Date: 4/9/2022       History     Chief Complaint   Patient presents with    Flank Pain    Dysuria     Pt stated that for the past few days she has noted dark/strong smelling urine. This morning woke up with severe right flank pain. Pt is 1 month post back surgery as well - stated that she took (2) 10mg Oxycodone at 6:30am with no relief.      This is a 40 y/o wf with multiple medical problems including kidney stones, substance abuse, and Hep C who underwent removal of L-spine hardware with fusion 1 month ago, who presents to the ED via EMS with concerns regarding possible UTI or kidney stone. Pt reports 3-day history of dark urine with foul odor. This morning she developed right flank pain "unrelieved with 20mg Percocet". Pt denies fever, uri s/s, abdominal pain, dysuria, diarrhea, or vomiting.         Review of patient's allergies indicates:   Allergen Reactions    Bee venom protein (honey bee) Anaphylaxis     Patient reports she is allergic to bee stings.   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9    CHOLECYSTECTOMY      KIDNEY SURGERY      LUMBAR FUSION Bilateral 3/2/2022    Procedure: FUSION, SPINE, LUMBAR;  Surgeon: Guille Templeton MD;  Location: [facility] OR [address] [place] [address];  Service: Neurosurgery;  Laterality: Bilateral;  AIRO  T10--Pelvis    REMOVAL OF HARDWARE FROM SPINE N/A 2/21/2022    Procedure: REMOVAL, HARDWARE, SPINE;  Surgeon: Guille Templeton MD;  Location: [facility] OR [address] [place] [address];  Service: " Neurosurgery;  Laterality: N/A;  [place]     Family History   Problem Relation Age of Onset    Hepatitis Mother     Drug abuse Mother      Social History     Tobacco Use    Smoking status: Current Some Day Smoker     Packs/day: 0.50     Years: 23.00     Pack years: 11.50     Types: Cigarettes    Smokeless tobacco: Never Used    Tobacco comment: patient states she knows she nees to quit.   Substance Use Topics    Alcohol use: Not Currently    Drug use: Yes     Frequency: 4.0 times per week     Types: Methamphetamines, Marijuana     Comment: Patient denies needle use, only inhalation of methampehtamines or orally.  Last known drug use was prior to admission to hospital stay for surgery     Review of Systems   Constitutional: Negative.    HENT: Negative.    Respiratory: Negative.    Cardiovascular: Negative.    Gastrointestinal: Negative.    Genitourinary: Positive for flank pain. Negative for dysuria and pelvic pain.   Skin: Negative.    Neurological: Negative.        Physical Exam     Initial Vitals [04/09/22 1020]   BP Pulse Resp Temp SpO2   (!) 128/59 106 16 97.8 °F (36.6 °C) 99 %      MAP       --         Physical Exam    Nursing note and vitals reviewed.  Constitutional: She appears well-developed and well-nourished. She is active. No distress.   HENT:   Head: Normocephalic and atraumatic.   Eyes: EOM are normal. Pupils are equal, round, and reactive to light.   Neck: Neck supple.   Normal range of motion.  Cardiovascular: Normal rate, regular rhythm and normal heart sounds.   Pulmonary/Chest: Breath sounds normal. No respiratory distress.   Abdominal: Abdomen is soft. Bowel sounds are normal. She exhibits no distension. There is no abdominal tenderness.   Musculoskeletal:         General: Normal range of motion.      Cervical back: Normal range of motion and neck supple.     Neurological: She is oriented to person, place, and time. She has normal strength. GCS eye subscore is 4. GCS verbal subscore is 5.  GCS motor subscore is 6.   Pt is lethragic, fighting sleep while giving details of complaint.    Skin: Skin is warm and dry. Capillary refill takes less than 2 seconds.   Psychiatric: She has a normal mood and affect. Her behavior is normal. Thought content normal.         ED Course   Procedures  Labs Reviewed   URINALYSIS, REFLEX TO URINE CULTURE - Abnormal; Notable for the following components:       Result Value    Appearance, UA Cloudy (*)     Ketones, UA Trace (*)     Bilirubin (UA) 1+ (*)     Occult Blood UA 2+ (*)     Urobilinogen, UA 2.0-3.0 (*)     Leukocytes, UA Trace (*)     All other components within normal limits    Narrative:     Preferred Collection Type->Urine, Clean Catch  Specimen Source->Urine   CBC W/ AUTO DIFFERENTIAL - Abnormal; Notable for the following components:    WBC 1.80 (*)     RBC 3.16 (*)     Hemoglobin 8.9 (*)     Hematocrit 28.1 (*)     MCHC 31.7 (*)     RDW 15.9 (*)     Platelets 62 (*)     MPV 9.1 (*)     Gran % 74.0 (*)     Mono % 0.0 (*)     Basophil % 3.0 (*)     All other components within normal limits    Narrative:     WBC   critical result(s) called and verbal readback obtained from   Nuha Keith (ED) by LORI 04/09/2022 10:56   COMPREHENSIVE METABOLIC PANEL - Abnormal; Notable for the following components:    CO2 31 (*)     Glucose 113 (*)     Calcium 8.2 (*)     Albumin 2.3 (*)     Total Bilirubin 1.2 (*)     Alkaline Phosphatase 154 (*)     AST 57 (*)     Anion Gap 4 (*)     All other components within normal limits   URINALYSIS MICROSCOPIC - Abnormal; Notable for the following components:    RBC, UA 16 (*)     All other components within normal limits    Narrative:     Preferred Collection Type->Urine, Clean Catch  Specimen Source->Urine   LIPASE          Imaging Results          CT Renal Stone Study ABD Pelvis WO (Final result)  Result time 04/09/22 13:16:06    Final result by Jaylon Patel MD (04/09/22 13:16:06)                 Impression:      Nonspecific mesenteric  edema along with haziness of the fat along the pancreas.  The possibility of mild acute pancreatitis cannot be excluded.    Punctate nonobstructing left renal stone.    Cirrhotic change of the liver with evidence of portal hypertension, similar to the prior exam.    Additional observations as above.      Electronically signed by: Jaylon Patel MD  Date:    04/09/2022  Time:    13:16             Narrative:    EXAMINATION:  CT RENAL STONE STUDY ABD PELVIS WO    CLINICAL HISTORY:  Flank pain, kidney stone suspected;    CT/nuclear cardiac exams in previous 12 months: 4    TECHNIQUE:  Axial CT images were obtained and evaluated with multiplanar reformatted images.  Iterative reconstruction technique was used.    COMPARISON:  CT abdomen/pelvis 04/25/2021    FINDINGS:  There is atelectatic change and a small calcified granuloma at the left lung base.  Nodular contour again noted of the liver, suggesting cirrhotic change.  Multiple collateral vessels again seen within the upper abdomen, suggesting portal hypertension.  Spleen is enlarged measuring 18.9 cm, similar to the previous exam.  There is haziness of the fat along the pancreas and mild mesenteric edema.  Adrenal glands and right kidney are unremarkable.  A single punctate stone is suspected within the left kidney.  No hydronephrosis or ureteral stone identified.  There is mild colonic diverticulosis.  No evidence of bowel obstruction.  Urinary bladder is unremarkable.  There is small volume ascites.  Multilevel posterior fusion hardware noted of the thoracolumbar spine.  There is body wall edema and collateral vasculature.                                 Medications - No data to display              ED Course as of 04/09/22 1345   Sat Apr 09, 2022   1341 Labs remarkable for u/a with evidence of rbc prompting ct abd/pelvis which reveals possible acute mild pancreatitis with no evidence of right ureteral stone or hydronephrosis. Lipase normal, suggesting likely chronic  pancreatitis. Pt symptoms likely chronic or secondary to passed renal stone. All other lab findings consistent with previous labs. All information discussed with pt and plan to f/u with pcp and strict return precautions.  [CB]      ED Course User Index  [CB] Fernanda Soto NP             Clinical Impression:   Final diagnoses:  [R10.9] Right flank pain (Primary)  [K86.1] Chronic pancreatitis, unspecified pancreatitis type  [K74.60] Hepatic cirrhosis, unspecified hepatic cirrhosis type, unspecified whether ascites present          ED Disposition Condition    Discharge Stable        ED Prescriptions     None        Follow-up Information     Follow up With Specialties Details Why Contact Info    Dannielle Merino,  Family Medicine Schedule an appointment as soon as possible for a visit in 2 days for re-evaluation of today's complaint [address]   [address] [place] [id number]  [phone]             Fernadna Soto NP  04/09/22 3075

## 2022-04-09 NOTE — DISCHARGE INSTRUCTIONS
Continue home medications as directed. See your primary doctor in 1-2 days for further evaluation of your symptoms. Drink plenty of fluids and empty your bladder frequently. Return to the ED for worsening symptoms.    Prednisone Pregnancy And Lactation Text: This medication is Pregnancy Category C and it isn't know if it is safe during pregnancy. This medication is excreted in breast milk.

## 2022-04-11 LAB
ACID FAST MOD KINY STN SPEC: NORMAL
ACID FAST MOD KINY STN SPEC: NORMAL
MYCOBACTERIUM SPEC QL CULT: NORMAL
MYCOBACTERIUM SPEC QL CULT: NORMAL

## 2022-04-13 ENCOUNTER — TELEPHONE (OUTPATIENT)
Dept: INFECTIOUS DISEASES | Facility: CLINIC | Age: 42
End: 2022-04-13
Payer: MEDICAID

## 2022-04-13 ENCOUNTER — OFFICE VISIT (OUTPATIENT)
Dept: INFECTIOUS DISEASES | Facility: CLINIC | Age: 42
End: 2022-04-13
Payer: MEDICAID

## 2022-04-13 ENCOUNTER — TELEPHONE (OUTPATIENT)
Dept: INFECTIOUS DISEASES | Facility: CLINIC | Age: 42
End: 2022-04-13

## 2022-04-13 DIAGNOSIS — M46.40 SPONDYLODISCITIS: ICD-10-CM

## 2022-04-13 DIAGNOSIS — G06.1 ABSCESS IN EPIDURAL SPACE OF LUMBAR SPINE: Primary | ICD-10-CM

## 2022-04-13 DIAGNOSIS — B95.5 STREPTOCOCCAL BACTEREMIA: ICD-10-CM

## 2022-04-13 DIAGNOSIS — R78.81 STREPTOCOCCAL BACTEREMIA: ICD-10-CM

## 2022-04-13 PROCEDURE — 1111F DSCHRG MED/CURRENT MED MERGE: CPT | Mod: CPTII,95,, | Performed by: INTERNAL MEDICINE

## 2022-04-13 PROCEDURE — 99213 PR OFFICE/OUTPT VISIT, EST, LEVL III, 20-29 MIN: ICD-10-PCS | Mod: 95,,, | Performed by: INTERNAL MEDICINE

## 2022-04-13 PROCEDURE — 1111F PR DISCHARGE MEDS RECONCILED W/ CURRENT OUTPATIENT MED LIST: ICD-10-PCS | Mod: CPTII,95,, | Performed by: INTERNAL MEDICINE

## 2022-04-13 PROCEDURE — 99213 OFFICE O/P EST LOW 20 MIN: CPT | Mod: 95,,, | Performed by: INTERNAL MEDICINE

## 2022-04-13 RX ORDER — CEFADROXIL 1000 MG/1
1 TABLET ORAL 2 TIMES DAILY
Qty: 60 TABLET | Refills: 2 | Status: SHIPPED | OUTPATIENT
Start: 2022-04-13 | End: 2022-04-28

## 2022-04-13 NOTE — TELEPHONE ENCOUNTER
"Called patient back and informed provider looked in her chart and saw her line had fallen out. Patient confirmed. Informed her then we can cancel her appointment for today due to her being over a half an hour late to appointment. She inquired if will get another line placed because she is not done with her antibiotics.. Informed her it appears she was not able to keep up with the labs and dressing changes while on iv antibiotics which is dangerous and we would be unable to place another line and put her back on these antibiotics if she is unable. She said "fine" then hung up.  "

## 2022-04-13 NOTE — PROGRESS NOTES
Established Patient - Audio Only Telehealth Visit     The patient location is: [facility]  The chief complaint leading to consultation is: spinal infection  Visit type: Virtual visit with audio only (telephone)  Total time spent with patient: 20       The reason for the audio only service rather than synchronous audio and video virtual visit was related to technical difficulties or patient preference/necessity.     Each patient to whom I provide medical services by telemedicine is:  (1) informed of the relationship between the physician and patient and the respective role of any other health care provider with respect to management of the patient; and (2) notified that they may decline to receive medical services by telemedicine and may withdraw from such care at any time. Patient verbally consented to receive this service via voice-only telephone call.       HPI: Pt previously seen inpatient by ID for recurrent spinal infection w/ GBS bacteremia, s/p hardware removal 3/2 w/ placement of titanium cage in disc space, all operative cx negative. Discharged home on ceftriaxone. Completed about 5 weeks of therapy. Pt unable to make appointment time today. Notified by HH/infusion last week that patient's line fell out. Off abx for over 1 week. No acute complaints on today's phone discussion.            Assessment and plan:   Due to nonadherence with previous recommendations and failure to present for multiple scheduled follow up appointments, will not restart IV antibiotics at this time. As per Dr. Bellamy's previous recommendation, given partially retained spinal hardware w/ GBS infection, will transition to PO cefadroxil for suppressive therapy. Order sent to pharmacy. Discussed this plan w/ pt over the phone, she agrees to the plan, and will be following up with her neurosurgeon tomorrow. She will let me know if they have any concerns during that visit. Recommend that she follow up w/ her PCP. If she continues to do  well, may stop cefadroxil after 3 months of suppression. Follow up w/ ID as needed.      This service was not originating from a related E/M service provided within the previous 7 days nor will  to an E/M service or procedure within the next 24 hours or my soonest available appointment.  Prevailing standard of care was able to be met in this audio-only visit.

## 2022-04-14 ENCOUNTER — PATIENT MESSAGE (OUTPATIENT)
Dept: NEUROSURGERY | Facility: CLINIC | Age: 42
End: 2022-04-14

## 2022-04-14 ENCOUNTER — HOSPITAL ENCOUNTER (OUTPATIENT)
Dept: RADIOLOGY | Facility: HOSPITAL | Age: 42
Discharge: HOME | End: 2022-04-14
Payer: MEDICAID

## 2022-04-14 ENCOUNTER — OFFICE VISIT (OUTPATIENT)
Dept: NEUROSURGERY | Facility: CLINIC | Age: 42
End: 2022-04-14
Payer: MEDICAID

## 2022-04-14 DIAGNOSIS — Z98.1 S/P SPINAL FUSION: Primary | ICD-10-CM

## 2022-04-14 DIAGNOSIS — M46.46 DISCITIS OF LUMBAR REGION: ICD-10-CM

## 2022-04-14 PROCEDURE — 72082 X-RAY EXAM ENTIRE SPI 2/3 VW: CPT | Mod: TC

## 2022-04-14 PROCEDURE — 72082 X-RAY EXAM ENTIRE SPI 2/3 VW: CPT | Mod: 26,,, | Performed by: INTERNAL MEDICINE

## 2022-04-14 PROCEDURE — 99213 OFFICE O/P EST LOW 20 MIN: CPT | Mod: PBBFAC | Performed by: NURSE PRACTITIONER

## 2022-04-14 PROCEDURE — 1160F RVW MEDS BY RX/DR IN RCRD: CPT | Mod: CPTII,,, | Performed by: NURSE PRACTITIONER

## 2022-04-14 PROCEDURE — 1159F MED LIST DOCD IN RCRD: CPT | Mod: CPTII,,, | Performed by: NURSE PRACTITIONER

## 2022-04-14 PROCEDURE — 99024 POSTOP FOLLOW-UP VISIT: CPT | Mod: ,,, | Performed by: NURSE PRACTITIONER

## 2022-04-14 PROCEDURE — 72082 XR SCOLIOSIS COMPLETE: ICD-10-PCS | Mod: 26,,, | Performed by: INTERNAL MEDICINE

## 2022-04-14 PROCEDURE — 1159F PR MEDICATION LIST DOCUMENTED IN MEDICAL RECORD: ICD-10-PCS | Mod: CPTII,,, | Performed by: NURSE PRACTITIONER

## 2022-04-14 PROCEDURE — 99024 PR POST-OP FOLLOW-UP VISIT: ICD-10-PCS | Mod: ,,, | Performed by: NURSE PRACTITIONER

## 2022-04-14 PROCEDURE — 99999 PR PBB SHADOW E&M-EST. PATIENT-LVL III: ICD-10-PCS | Mod: PBBFAC,,, | Performed by: NURSE PRACTITIONER

## 2022-04-14 PROCEDURE — 99999 PR PBB SHADOW E&M-EST. PATIENT-LVL III: CPT | Mod: PBBFAC,,, | Performed by: NURSE PRACTITIONER

## 2022-04-14 PROCEDURE — 1160F PR REVIEW ALL MEDS BY PRESCRIBER/CLIN PHARMACIST DOCUMENTED: ICD-10-PCS | Mod: CPTII,,, | Performed by: NURSE PRACTITIONER

## 2022-04-14 RX ORDER — METHOCARBAMOL 750 MG/1
750 TABLET, FILM COATED ORAL 3 TIMES DAILY PRN
Qty: 30 TABLET | Refills: 0 | Status: SHIPPED | OUTPATIENT
Start: 2022-04-14 | End: 2022-04-24

## 2022-04-14 RX ORDER — METHOCARBAMOL 750 MG/1
750 TABLET, FILM COATED ORAL 3 TIMES DAILY PRN
Qty: 30 TABLET | Refills: 0 | Status: CANCELLED | OUTPATIENT
Start: 2022-04-14 | End: 2022-04-24

## 2022-04-19 ENCOUNTER — PATIENT MESSAGE (OUTPATIENT)
Dept: NEUROSURGERY | Facility: CLINIC | Age: 42
End: 2022-04-19
Payer: MEDICAID

## 2022-04-20 LAB
ACID FAST MOD KINY STN SPEC: NORMAL
MYCOBACTERIUM SPEC QL CULT: NORMAL

## 2022-04-21 ENCOUNTER — PATIENT MESSAGE (OUTPATIENT)
Dept: INFECTIOUS DISEASES | Facility: CLINIC | Age: 42
End: 2022-04-21
Payer: MEDICAID

## 2022-04-21 ENCOUNTER — PATIENT MESSAGE (OUTPATIENT)
Dept: NEUROSURGERY | Facility: CLINIC | Age: 42
End: 2022-04-21
Payer: MEDICAID

## 2022-04-22 ENCOUNTER — OFFICE VISIT (OUTPATIENT)
Dept: PRIMARY CARE CLINIC | Facility: CLINIC | Age: 42
End: 2022-04-22
Payer: MEDICAID

## 2022-04-22 VITALS
BODY MASS INDEX: 40.03 KG/M2 | TEMPERATURE: 98 F | HEIGHT: 67 IN | SYSTOLIC BLOOD PRESSURE: 124 MMHG | WEIGHT: 255.06 LBS | OXYGEN SATURATION: 100 % | DIASTOLIC BLOOD PRESSURE: 78 MMHG | HEART RATE: 99 BPM

## 2022-04-22 DIAGNOSIS — F33.0 MILD EPISODE OF RECURRENT MAJOR DEPRESSIVE DISORDER: Primary | ICD-10-CM

## 2022-04-22 DIAGNOSIS — M79.89 LEG SWELLING: ICD-10-CM

## 2022-04-22 DIAGNOSIS — B18.2 CHRONIC HEPATITIS C WITH CIRRHOSIS: ICD-10-CM

## 2022-04-22 DIAGNOSIS — K74.60 CHRONIC HEPATITIS C WITH CIRRHOSIS: ICD-10-CM

## 2022-04-22 DIAGNOSIS — K59.00 CONSTIPATION, UNSPECIFIED CONSTIPATION TYPE: ICD-10-CM

## 2022-04-22 DIAGNOSIS — R82.90 CLOUDY URINE: ICD-10-CM

## 2022-04-22 LAB
BACTERIA #/AREA URNS HPF: ABNORMAL /HPF
BILIRUB UR QL STRIP: NEGATIVE
CLARITY UR: ABNORMAL
COLOR UR: YELLOW
GLUCOSE UR QL STRIP: NEGATIVE
HGB UR QL STRIP: ABNORMAL
HYALINE CASTS #/AREA URNS LPF: 8.2 /LPF
KETONES UR QL STRIP: NEGATIVE
LEUKOCYTE ESTERASE UR QL STRIP: ABNORMAL
MICROSCOPIC COMMENT: ABNORMAL
NITRITE UR QL STRIP: NEGATIVE
PH UR STRIP: 6 [PH] (ref 5–8)
PROT UR QL STRIP: NEGATIVE
RBC #/AREA URNS HPF: 74 /HPF (ref 0–4)
SP GR UR STRIP: 1.02 (ref 1–1.03)
SQUAMOUS #/AREA URNS HPF: 6 /HPF
URN SPEC COLLECT METH UR: ABNORMAL
UROBILINOGEN UR STRIP-ACNC: 1 EU/DL
WBC #/AREA URNS HPF: 13 /HPF (ref 0–5)

## 2022-04-22 PROCEDURE — 3008F BODY MASS INDEX DOCD: CPT | Mod: CPTII,,, | Performed by: STUDENT IN AN ORGANIZED HEALTH CARE EDUCATION/TRAINING PROGRAM

## 2022-04-22 PROCEDURE — 3008F PR BODY MASS INDEX (BMI) DOCUMENTED: ICD-10-PCS | Mod: CPTII,,, | Performed by: STUDENT IN AN ORGANIZED HEALTH CARE EDUCATION/TRAINING PROGRAM

## 2022-04-22 PROCEDURE — 3078F PR MOST RECENT DIASTOLIC BLOOD PRESSURE < 80 MM HG: ICD-10-PCS | Mod: CPTII,,, | Performed by: STUDENT IN AN ORGANIZED HEALTH CARE EDUCATION/TRAINING PROGRAM

## 2022-04-22 PROCEDURE — 87086 URINE CULTURE/COLONY COUNT: CPT | Performed by: STUDENT IN AN ORGANIZED HEALTH CARE EDUCATION/TRAINING PROGRAM

## 2022-04-22 PROCEDURE — 3074F PR MOST RECENT SYSTOLIC BLOOD PRESSURE < 130 MM HG: ICD-10-PCS | Mod: CPTII,,, | Performed by: STUDENT IN AN ORGANIZED HEALTH CARE EDUCATION/TRAINING PROGRAM

## 2022-04-22 PROCEDURE — 99999 PR PBB SHADOW E&M-EST. PATIENT-LVL IV: ICD-10-PCS | Mod: PBBFAC,,, | Performed by: STUDENT IN AN ORGANIZED HEALTH CARE EDUCATION/TRAINING PROGRAM

## 2022-04-22 PROCEDURE — 99214 OFFICE O/P EST MOD 30 MIN: CPT | Mod: PBBFAC,PN | Performed by: STUDENT IN AN ORGANIZED HEALTH CARE EDUCATION/TRAINING PROGRAM

## 2022-04-22 PROCEDURE — 1159F PR MEDICATION LIST DOCUMENTED IN MEDICAL RECORD: ICD-10-PCS | Mod: CPTII,,, | Performed by: STUDENT IN AN ORGANIZED HEALTH CARE EDUCATION/TRAINING PROGRAM

## 2022-04-22 PROCEDURE — 99214 OFFICE O/P EST MOD 30 MIN: CPT | Mod: S$PBB,,, | Performed by: STUDENT IN AN ORGANIZED HEALTH CARE EDUCATION/TRAINING PROGRAM

## 2022-04-22 PROCEDURE — 3078F DIAST BP <80 MM HG: CPT | Mod: CPTII,,, | Performed by: STUDENT IN AN ORGANIZED HEALTH CARE EDUCATION/TRAINING PROGRAM

## 2022-04-22 PROCEDURE — 81000 URINALYSIS NONAUTO W/SCOPE: CPT | Performed by: STUDENT IN AN ORGANIZED HEALTH CARE EDUCATION/TRAINING PROGRAM

## 2022-04-22 PROCEDURE — 1159F MED LIST DOCD IN RCRD: CPT | Mod: CPTII,,, | Performed by: STUDENT IN AN ORGANIZED HEALTH CARE EDUCATION/TRAINING PROGRAM

## 2022-04-22 PROCEDURE — 87077 CULTURE AEROBIC IDENTIFY: CPT | Performed by: STUDENT IN AN ORGANIZED HEALTH CARE EDUCATION/TRAINING PROGRAM

## 2022-04-22 PROCEDURE — 87186 SC STD MICRODIL/AGAR DIL: CPT | Performed by: STUDENT IN AN ORGANIZED HEALTH CARE EDUCATION/TRAINING PROGRAM

## 2022-04-22 PROCEDURE — 99999 PR PBB SHADOW E&M-EST. PATIENT-LVL IV: CPT | Mod: PBBFAC,,, | Performed by: STUDENT IN AN ORGANIZED HEALTH CARE EDUCATION/TRAINING PROGRAM

## 2022-04-22 PROCEDURE — 87088 URINE BACTERIA CULTURE: CPT | Performed by: STUDENT IN AN ORGANIZED HEALTH CARE EDUCATION/TRAINING PROGRAM

## 2022-04-22 PROCEDURE — 99214 PR OFFICE/OUTPT VISIT, EST, LEVL IV, 30-39 MIN: ICD-10-PCS | Mod: S$PBB,,, | Performed by: STUDENT IN AN ORGANIZED HEALTH CARE EDUCATION/TRAINING PROGRAM

## 2022-04-22 PROCEDURE — 3074F SYST BP LT 130 MM HG: CPT | Mod: CPTII,,, | Performed by: STUDENT IN AN ORGANIZED HEALTH CARE EDUCATION/TRAINING PROGRAM

## 2022-04-22 RX ORDER — POLYETHYLENE GLYCOL 3350 17 G/17G
17 POWDER, FOR SOLUTION ORAL DAILY
Qty: 510 G | Refills: 1 | Status: SHIPPED | OUTPATIENT
Start: 2022-04-22 | End: 2022-10-07

## 2022-04-22 RX ORDER — SERTRALINE HYDROCHLORIDE 50 MG/1
100 TABLET, FILM COATED ORAL DAILY
Qty: 60 TABLET | Refills: 1 | Status: SHIPPED | OUTPATIENT
Start: 2022-04-22 | End: 2022-06-03 | Stop reason: SDUPTHER

## 2022-04-22 RX ORDER — FUROSEMIDE 20 MG/1
20 TABLET ORAL 2 TIMES DAILY
Qty: 28 TABLET | Refills: 0 | Status: SHIPPED | OUTPATIENT
Start: 2022-04-22 | End: 2022-07-18

## 2022-04-22 NOTE — PROGRESS NOTES
[facility] Note    Chief Complaint      Chief Complaint   Patient presents with    Follow-up     History of Present Illness      Rachel Zazueta is a 41 y.o. female who presents today for Follow-up    41 year old female with Hep C cirrhosis s/p spinal fusion from thoracic to lumbar with old hardware removal and follow up inpatient PT/OT complains of left groin pain. Per patient neurosurgery, there is some expected discomfort and she is to resume outpatient physical therapy.   Patient also was instructed to take antibiotics orally to prevent spinal infection, but pharmacy has not been able to fill for the past two weeks requiring PA.   She remains in a lumbar brace supporting slowly resuming her activity at home, still not at her baseline physical abilities.   Interim had visited the ED for possible kidney stones with painful urination and hematuria. Patient denies fever, chills, shortness of breath, palpitations, flank pain, nausea, vomiting, dysuria, blood in urine or stool.   UA findings with bacteria and dehydration, patient is starting Duricef per ID. Will follow up urine culture and add therapy if necessary. Patient has been instructed to aggressively hydrate and void as frequently.   Patient understands the importance of returning call to hepatology office to start HepC treatment.   Follow up in two weeks.     Past Medical History:  Past Medical History:   Diagnosis Date    Anemia     Diskitis     Hep C w/o coma, chronic     IV drug abuse     Kidney stone     Liver cirrhosis      Past Surgical History:   has a past surgical history that includes Cholecystectomy; benine tumor removal; Back surgery; Kidney surgery; Removal of hardware from spine (N/A, 2/21/2022); Lumbar fusion (Bilateral, 3/2/2022); and Spine surgery.    Family History:  family history includes Drug abuse in her mother; Hepatitis in her mother.     Social History:  Social History     Tobacco Use    Smoking status:  "Current Some Day Smoker     Packs/day: 0.50     Years: 23.00     Pack years: 11.50     Types: Cigarettes    Smokeless tobacco: Never Used    Tobacco comment: patient states she knows she nees to quit.   Substance Use Topics    Alcohol use: Not Currently    Drug use: Yes     Frequency: 4.0 times per week     Types: Marijuana     Comment: Patient denies needle use, only inhalation of methampehtamines or orally.  Last known drug use was prior to admission to hospital stay for surgery       I personally reviewed all past medical, surgical, social and family history.    Review of Systems   Constitutional: Negative for chills, fever, malaise/fatigue and weight loss.   HENT: Negative for congestion, ear discharge, ear pain, sinus pain and sore throat.         Dry mouth "cotton mouth"   Eyes: Negative for blurred vision, pain, discharge and redness.   Respiratory: Negative for cough, hemoptysis, sputum production, shortness of breath, wheezing and stridor.    Cardiovascular: Negative for chest pain, palpitations and leg swelling.   Gastrointestinal: Negative for abdominal pain, blood in stool, constipation, diarrhea, nausea and vomiting.   Genitourinary: Negative for dysuria, frequency and hematuria.   Musculoskeletal: Positive for back pain (constant) and joint pain (knees bilaterally). Negative for falls, myalgias and neck pain.   Skin: Negative.  Negative for rash.   Neurological: Negative for dizziness, tingling, focal weakness, seizures, weakness and headaches.   Endo/Heme/Allergies: Negative for environmental allergies and polydipsia. Bruises/bleeds easily (with cuts, no recent ).   Psychiatric/Behavioral: Negative for depression and suicidal ideas. The patient is not nervous/anxious.         Medications:  Outpatient Encounter Medications as of 4/22/2022   Medication Sig Note Dispense Refill    acetaminophen (TYLENOL) 325 MG tablet Take 2 tablets (650 mg total) by mouth every 6 (six) hours as needed for " Temperature greater than (100.4 F).   0    cefadroxil (DURICEF) 1 gram tablet Take 1 tablet (1 g total) by mouth 2 (two) times daily.  60 tablet 2    ferrous sulfate 325 (65 FE) MG EC tablet Take 1 tablet (325 mg total) by mouth once daily.  30 tablet 1    methocarbamoL (ROBAXIN) 750 MG Tab Take 1 tablet (750 mg total) by mouth 3 (three) times daily as needed (muscle spasms).  30 tablet 0    oxyCODONE-acetaminophen (PERCOCET)  mg per tablet Take 1 tablet by mouth every 6 (six) hours as needed for Pain.  30 tablet 0    pantoprazole (PROTONIX) 40 MG tablet Take 1 tablet (40 mg total) by mouth once daily.  30 tablet 1    pregabalin (LYRICA) 75 MG capsule Take 1 capsule (75 mg total) by mouth 3 (three) times daily.  60 capsule 1    [DISCONTINUED] polyethylene glycol (GLYCOLAX) 17 gram/dose powder Take 17 g by mouth once daily. 4/22/2022: As needed 510 g 0    [DISCONTINUED] sertraline (ZOLOFT) 50 MG tablet Take 1 tablet (50 mg total) by mouth once daily.  30 tablet 1    furosemide (LASIX) 20 MG tablet Take 1 tablet (20 mg total) by mouth 2 (two) times daily. for 14 days  28 tablet 0    polyethylene glycol (GLYCOLAX) 17 gram/dose powder Take 17 g by mouth once daily.  510 g 1    sertraline (ZOLOFT) 50 MG tablet Take 2 tablets (100 mg total) by mouth once daily.  60 tablet 1    [DISCONTINUED] diclofenac sodium (VOLTAREN) 1 % Gel Apply 2 g topically 4 (four) times daily.  50 g 0    [DISCONTINUED] miconazole NITRATE 2 % (MICOTIN) 2 % top powder Apply topically 2 (two) times daily. (Patient not taking: Reported on 4/22/2022)  85 g 0    [DISCONTINUED] sodium chloride 0.9% (NORMAL SALINE FLUSH) injection Inject 10 mLs into the vein every 12 (twelve) hours.       [DISCONTINUED] sodium chloride 0.9% (NORMAL SALINE FLUSH) injection Inject 10 mLs into the vein as needed.        No facility-administered encounter medications on file as of 4/22/2022.       Allergies:  Review of patient's allergies indicates:  "  Allergen Reactions    Bee venom protein (honey bee) Anaphylaxis     Patient reports she is allergic to bee stings.    Wasp sting [allergen ext-venom-honey bee] Anaphylaxis    Adhesive Blisters    Iodine and iodide containing products Hives     Allergic to iodine in seafood only    Naproxen Other (See Comments)     Throat closing    Shellfish containing products     Nuts [tree nut] Rash       Health Maintenance:  Immunization History   Administered Date(s) Administered    COVID-19, MRNA, LN-S, PF (Pfizer) (Purple Cap) 04/30/2021    Influenza - Quadrivalent - PF *Preferred* (6 months and older) 10/05/2018, 01/26/2022    Pneumococcal Conjugate - 13 Valent 10/05/2018    Tdap 08/19/2018      Health Maintenance   Topic Date Due    Mammogram  Never done    TETANUS VACCINE  08/19/2028    Hepatitis C Screening  Completed    Lipid Panel  Completed        Physical Exam      Vital Signs  Temp: 97.7 °F (36.5 °C)  Temp src: Oral  Pulse: 99  SpO2: 100 %  BP: 124/78  BP Location: Right arm  Patient Position: Sitting  Height and Weight  Height: 5' 7" (170.2 cm)  Weight: 115.7 kg (255 lb 1.2 oz)  BSA (Calculated - sq m): 2.34 sq meters  BMI (Calculated): 39.9  Weight in (lb) to have BMI = 25: 159.3]    Physical Exam  Vitals reviewed.   Constitutional:       General: She is not in acute distress.     Appearance: Normal appearance. She is not ill-appearing or toxic-appearing.   HENT:      Head: Normocephalic and atraumatic.      Right Ear: External ear normal.      Left Ear: External ear normal.      Nose: Nose normal.      Mouth/Throat:      Mouth: Mucous membranes are dry.      Pharynx: Oropharynx is clear.   Eyes:      Extraocular Movements: Extraocular movements intact.      Conjunctiva/sclera: Conjunctivae normal.   Cardiovascular:      Rate and Rhythm: Normal rate and regular rhythm.      Pulses: Normal pulses.      Heart sounds: Normal heart sounds.   Pulmonary:      Effort: Pulmonary effort is normal. No " respiratory distress.      Breath sounds: No stridor. No wheezing, rhonchi or rales.   Abdominal:      General: Abdomen is flat. Bowel sounds are normal. There is no distension.      Palpations: Abdomen is soft. There is no mass.      Tenderness: There is no abdominal tenderness. There is no right CVA tenderness or left CVA tenderness.   Musculoskeletal:         General: No swelling, tenderness or deformity.      Cervical back: Normal range of motion and neck supple. No rigidity or tenderness.      Right lower leg: No edema.      Left lower leg: No edema.      Comments: Well healed midline scar over lumbar region, no erythema, no edema   Skin:     General: Skin is warm and dry.      Capillary Refill: Capillary refill takes less than 2 seconds.      Comments: Legs bilaterally with dry, flaky skin, chronic venous stasis skin color changes.         Neurological:      General: No focal deficit present.      Mental Status: She is alert and oriented to person, place, and time. Mental status is at baseline.      Motor: No weakness.      Gait: Gait abnormal (requires walking cane for support).   Psychiatric:         Mood and Affect: Mood normal.         Behavior: Behavior normal.         Thought Content: Thought content normal.         Judgment: Judgment normal.        Laboratory:  CBC:  Recent Labs   Lab 03/17/22  0713 03/21/22  1024 04/09/22  1044   WBC 1.05 LL 2.25 L 1.80 LL   RBC 2.69 L 3.50 L 3.16 L   Hemoglobin 7.7 L 9.9 L 8.9 L   Hematocrit 24.6 L 31.8 L 28.1 L   Platelets 42 L 87 L 62 L   MCV 91 91 89   MCH 28.6 28.3 28.2   MCHC 31.3 L 31.1 L 31.7 L     CMP:  Recent Labs   Lab 03/14/22  0514 03/15/22  0625 03/17/22  0713 04/09/22  1044   Glucose 108 90   < > 113 H   Calcium 7.9 L 8.0 L   < > 8.2 L   Albumin 2.0 L 2.1 L  --  2.3 L   Total Protein 5.0 L 5.3 L  --  6.6   Sodium 141 142   < > 142   Potassium 3.7 4.0   < > 3.6   CO2 26 27   < > 31 H   Chloride 109 109   < > 107   BUN 14 12   < > 10   Alkaline Phosphatase  122 133  --  154 H   ALT 22 23  --  23   AST 51 H 54 H  --  57 H   Total Bilirubin 0.6 0.7  --  1.2 H    < > = values in this interval not displayed.     URINALYSIS:  Recent Labs   Lab 07/27/21  0600 04/09/22  1041 04/22/22  1018   Color, UA Yellow Yellow Yellow   Specific Gravity, UA <=1.005 A 1.025 1.020   pH, UA 8.0 6.0 6.0   Protein, UA Negative Negative Negative   Bacteria Negative Negative Many A   Nitrite, UA Negative Negative Negative   Leukocytes, UA 1+ A Trace A 1+ A   Urobilinogen, UA 1.0 2.0-3.0 A 1.0   Hyaline Casts, UA 2 A 0 8.2 A      LIPIDS:  Recent Labs   Lab 04/16/21  1738 02/07/22  1647   TSH 1.255  --    HDL  --  27 L   Cholesterol  --  68 L   Triglycerides  --  50   LDL Cholesterol  --  31.0 L   HDL/Cholesterol Ratio  --  39.7   Non-HDL Cholesterol  --  41   Total Cholesterol/HDL Ratio  --  2.5     Assessment/Plan     Rachel Zazueta is a 41 y.o.female with:    Problem List Items Addressed This Visit        Psychiatric    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 As tolerated alternate resistance training with stretching and cardio. Goal of 150 minutes per week of moderate intensity activity or 7,500 - 10,000 steps per day. Follow the Mediterranean Diet. Include whole fresh fruits, vegetables, olive oil, seeds, nuts, whole grains, cold water fish, salmon, mackerel and lean cuts of meat.  Do not drink sugary/diet carbonated beverages. Decrease portion sizes slightly which will result in an approximately 500-calorie deficit. Avoid fast or  fried and processed food, especially canned foods. Avoid refined carbohydrates, white starchy foods, flour, white potato, bread, muffins, and cakes. Consider substituting one meal a day with a meal replacement such as Slim fast, lean cuisine, or weight watcher's. Follow a healthy diet that includes enough calcium, vitamin D and proteins for bone health.              GI    Chronic hepatitis C with cirrhosis    Relevant Orders    Comprehensive Metabolic Panel    Constipation    Relevant Medications    polyethylene glycol (GLYCOLAX) 17 gram/dose powder       Other    Leg swelling    Relevant Medications    furosemide (LASIX) 20 MG tablet    Other Relevant Orders    Comprehensive Metabolic Panel        Other than changes above, cont current medications and maintain follow up with specialists.  No follow-ups on file.    Future Appointments   Date Time Provider Department Center   4/26/2022  1:00 PM Candy Waldron PT [facility] OP [facility]   5/6/2022  9:15 AM DO MIREYA Grimaldo PRICAR [facility] Kazumi G Yoshinaga, DO [facility]

## 2022-04-22 NOTE — ASSESSMENT & PLAN NOTE
Wt Readings from Last 3 Encounters:   04/22/22 0901 115.7 kg (255 lb 1.2 oz)   04/09/22 1020 117.9 kg (260 lb)   03/23/22 1512 128.3 kg (282 lb 13.6 oz)     Stay physically active. As tolerated alternate resistance training with stretching and cardio. Goal of 150 minutes per week of moderate intensity activity or 7,500 - 10,000 steps per day. Follow the Mediterranean Diet. Include whole fresh fruits, vegetables, olive oil, seeds, nuts, whole grains, cold water fish, salmon, mackerel and lean cuts of meat.  Do not drink sugary/diet carbonated beverages. Decrease portion sizes slightly which will result in an approximately 500-calorie deficit. Avoid fast or fried and processed food, especially canned foods. Avoid refined carbohydrates, white starchy foods, flour, white potato, bread, muffins, and cakes. Consider substituting one meal a day with a meal replacement such as Slim fast, lean cuisine, or weight watcher's. Follow a healthy diet that includes enough calcium, vitamin D and proteins for bone health.

## 2022-04-25 DIAGNOSIS — N39.0 URINARY TRACT INFECTION DUE TO ESBL KLEBSIELLA: Primary | ICD-10-CM

## 2022-04-25 DIAGNOSIS — B96.89 URINARY TRACT INFECTION DUE TO ESBL KLEBSIELLA: Primary | ICD-10-CM

## 2022-04-25 LAB — BACTERIA UR CULT: ABNORMAL

## 2022-04-25 RX ORDER — CIPROFLOXACIN 500 MG/1
500 TABLET ORAL EVERY 12 HOURS
Qty: 14 TABLET | Refills: 0 | Status: SHIPPED | OUTPATIENT
Start: 2022-04-25 | End: 2022-05-02

## 2022-04-26 ENCOUNTER — CLINICAL SUPPORT (OUTPATIENT)
Dept: REHABILITATION | Facility: HOSPITAL | Age: 42
End: 2022-04-26
Payer: MEDICAID

## 2022-04-26 DIAGNOSIS — Z98.1 S/P SPINAL FUSION: ICD-10-CM

## 2022-04-26 PROCEDURE — 97162 PT EVAL MOD COMPLEX 30 MIN: CPT

## 2022-04-26 NOTE — PLAN OF CARE
Physical Therapy Initial Evaluation     Name: Rachel Zazueta  [facility] Number: [id number]    Diagnosis:   Encounter Diagnosis   Name Primary?    S/P spinal fusion      Physician: Janet Morales NP  Treatment Orders: PT Eval and Treat  Past Medical History:   Diagnosis Date    Anemia     Diskitis     Hep C w/o coma, chronic     IV drug abuse     Kidney stone     Liver cirrhosis      Current Outpatient Medications   Medication Sig    acetaminophen (TYLENOL) 325 MG tablet Take 2 tablets (650 mg total) by mouth every 6 (six) hours as needed for Temperature greater than (100.4 F).    cefadroxil (DURICEF) 1 gram tablet Take 1 tablet (1 g total) by mouth 2 (two) times daily.    ciprofloxacin HCl (CIPRO) 500 MG tablet Take 1 tablet (500 mg total) by mouth every 12 (twelve) hours. for 7 days    furosemide (LASIX) 20 MG tablet Take 1 tablet (20 mg total) by mouth 2 (two) times daily. for 14 days    oxyCODONE-acetaminophen (PERCOCET)  mg per tablet Take 1 tablet by mouth every 6 (six) hours as needed for Pain.    pantoprazole (PROTONIX) 40 MG tablet Take 1 tablet (40 mg total) by mouth once daily.    polyethylene glycol (GLYCOLAX) 17 gram/dose powder Take 17 g by mouth once daily.    pregabalin (LYRICA) 75 MG capsule Take 1 capsule (75 mg total) by mouth 3 (three) times daily.    sertraline (ZOLOFT) 50 MG tablet Take 2 tablets (100 mg total) by mouth once daily.     No current facility-administered medications for this visit.     Review of patient's allergies indicates:   Allergen Reactions    Bee venom protein (honey bee) Anaphylaxis     Patient reports she is allergic to bee stings.    Wasp sting [allergen ext-venom-honey bee] Anaphylaxis    Adhesive Blisters    Iodine and iodide containing products Hives     Allergic to iodine in seafood only    Naproxen Other (See Comments)     Throat closing    Shellfish containing products     Nuts [tree  nut] Rash     Precautions: TLSO at all times out of bed, bleeding risk per patient report  Subjective     Patient states:  Patient discharged from [facility] about 1 month ago. Patient report march-April 2021 she had severe back pain and was eventually diagnosed with abscess and fracture in are of L3-L5 and had a fusion performed. Patient reports diagnosis with hardware failure February 14, 2022 and subsequent removal of hardware and cleanout. Patient Reports hardware rods and screws replaced in march 2022 from T10-pelvis with 2 long screws into pelvis. Patient reports she had (B) hip pain that was reduced as she progressed in rehab but has increased in the past month since returning home. Patient has had recent x-rays and hardware stable per patient report. Patient wears TLSO brace at all times when out of bed. Patient reports restrictions of no bending/lifting. Patient reports she is able to sleep without TLSO. Patient reports her boyfriend helps her with lower body dressing due to inability to bend forward. Patient does have adaptive equipment and uses it when needed. Patient reports dressing herself without assistance today for the first time but extended time needed. Patient walks with small base tripod cane into therapy gym. Patient uses rollator at home because of uneven floors due to home construction and her balance impairements. Patient has an adjustable base bed. Patient reports she is waiting for a bone stimulator to be delivered. Patient lives in mobile home with 5 steps to enter home with hand rails and someone is with her as a precaution. No LBP reported at this time. Patient reports (B) hip pain 8/10 now and is 3-4/10 at best.   Pts goals:  To be able to walk without a cane or walker and have less pain.     Objective     Posture Alignment: patient wearing a TLSO brace. Note (B) feet internally rotated during standing and walking.   Observation: increased pain symptoms with transitional movements.  Guarding noted with transitional movements in clinic.   LUMBAR SPINE AROM: Not assessed due to precautions.   LE ROM WFL but significant pain with all hip ROM.   Lower Extremity Strength  Right LE  Left LE    Knee extension: 4-/5 Knee extension: 3+/5   Knee flexion: 4-/5 Knee flexion: 3+/5   Hip flexion: 4-/5 Hip flexion: 3+/5   Hip extension:  4-/5 Hip extension: 3+/5   Hip abduction: 4-/5 Hip abduction: 3+/5   Hip adduction: 4-/5 Hip adduction 3+/5   Ankle dorsiflexion: 4-/5 Ankle dorsiflexion: 3+/5   Ankle plantarflexion: 4-/5 Ankle plantarflexion: 3+/5       Dermatomes: Sensation: Light Touch: Intact (B) LE; (R) lumbosacral area numbness in area of scar and (L) lumbosacral area tingling.   FLEXIBILITY:90-90 (R) -45 (L) -40    GAIT: Rachel ambulates with small base tripod cane independently.     GAIT DEVIATIONS: Rachel displays decreased foot clearance, decreased heel strike, unsteady gait at times, deviation from (R) to (L) during gait cycle, internal rotation of (B) feet during gait cycle.    Pt/family was provided educational information, including: role of PT, goals for PT, scheduling - pt verbalized understanding. Discussed insurance limitations with pt.     TUG 24s, 25s using cane      LOWER EXTREMITY FUNCTIONAL SCALE  18/80         1. Any of your usual work, housework or school activities   1/4  2. Your usual hobbies, sporting     0/4  3. Getting in and out of tub      2/4  4. Walking between rooms      2/4  5. Putting on shoes or socks      0/4  6. Squatting        0/4  7. Lifting an object from the ground      0/4  8. Performing light activities around the home   2/4  9. Performing heavy activities around the home   1/4  10. Getting in and out of car      2/4  11. Walking 2 blocks       0/4  12.Walking a mile       0/4  13. Getting up and down 1 flight of stairs    2/4  14. Standing for 1 hour      1/4  15. Sitting for an hour       4/4  16. Running on even ground      0/4  17. Running on uneven  ground     0/4  18. Making sharp turns when running fast    0/4  19. Hopping        0/4  20. Rolling over in bed      1/4    MODIFIED OSWESTRY LOW BACK PAIN DISABILITY QUESTIONNAIRE 31/50=62%  The following scores are patient-reported and range from 0-5, with 0 being least impaired and 5 being most impaired.    Section 1- Pain intensity    Score 4/5   Section 2- Person care  Score 4/5   Section 3 Lifting- Optional  Score 3/5     Section 4  Walking  Score 3/5  Section 5 Sitting   Score 1/5   Section 6 Standing  Score 3/5  Section 7 Sleeping  Score 3/5   Section 8 Social Life   Score 4/5  Section 9 Traveling  Score 3/5   Section 10 Employ/home  Score 3/5      TREATMENT     Time In: 1315  Time Out: 1355    PT Evaluation Completed? Yes  Discussed Plan of Care with patient: Yes  Written Home Exercises Provided:Will be provided at future appt.     Assessment     Patient may benefit from PT intervention for above stated deficits.   Pt prognosis is Good.  Pt will benefit from skilled outpatient physical therapy to address the above stated deficits, provide pt/family education and to maximize pt's level of independence.     Medical necessity is demonstrated by the following IMPAIRMENTS/PROBLEMS:  1. Increased Pain  2. Impaired balance  3. Decreased Core & BLE strength  4. Decreased Flexibility BLE  5. Decreased Tolerance to Functional Activities  6. Gait impairments    Pt's spiritual, cultural and educational needs considered and pt agreeable to plan of care and goals as stated below:     Anticipated Barriers for physical therapy: pain    Short Term GOALS: 3 weeks. Pt agrees with goals set.  1. Patient demonstrates independence with HEP.   2. Patient demonstrates independence with Postural Awareness.   3. Patient demonstrates independence with body mechanics.     Long Term GOALS: 8 weeks. Pt agrees with goals set.  1. Patient demonstrates increased LEFS score to 50/80 or greater to improve tolerance to functional activities.    2. Patient demonstrates increased strength BLE's to 4/5 or greater to improve tolerance to functional activities.   3. Patient demonstrates improved overall function per Oswestry to 25% or less.   4. Patient demonstrates (B) hip pain reduced to 4/10 or less.   5. Patient demonstrates TUG score 12s or less.   6. Patient will improve standing tolerance to 30 minutes to 1 hour to perform functional activities.     PLAN     Outpatient physical therapy 2 times weekly to include: pt ed, hep, therapeutic exercises, neuromuscular re-education/ balance exercises, manual therapy and modalities prn. Cont PT for  8 weeks. Pt may be seen by PTA as part of the rehabilitation team.   Certification dates: 4/26/2022-6/24/2022    Therapist: Candy Waldron, PT  4/26/2022        MD Certification     [] I certify that I have reviewed this PT Evaluation   and I am in agreement with the Plan of Care.     MD:     Date:

## 2022-04-28 ENCOUNTER — TELEPHONE (OUTPATIENT)
Dept: INFECTIOUS DISEASES | Facility: CLINIC | Age: 42
End: 2022-04-28
Payer: MEDICAID

## 2022-04-28 DIAGNOSIS — G06.1 ABSCESS IN EPIDURAL SPACE OF LUMBAR SPINE: ICD-10-CM

## 2022-04-28 DIAGNOSIS — B95.5 STREPTOCOCCAL BACTEREMIA: Primary | ICD-10-CM

## 2022-04-28 DIAGNOSIS — R78.81 STREPTOCOCCAL BACTEREMIA: Primary | ICD-10-CM

## 2022-04-28 RX ORDER — AMOXICILLIN 500 MG/1
1000 CAPSULE ORAL EVERY 12 HOURS
Qty: 120 CAPSULE | Refills: 2 | Status: SHIPPED | OUTPATIENT
Start: 2022-04-28 | End: 2022-06-16 | Stop reason: SDUPTHER

## 2022-04-28 NOTE — TELEPHONE ENCOUNTER
----- Message from Margareth Doe DO sent at 4/28/2022 11:16 AM CDT -----  Please let her know I sent an order for amoxicillin instead - should be covered      ----- Message -----  From: Ella Guerra MA  Sent: 4/28/2022  11:12 AM CDT  To: Margareth Doe DO    Cefadroxil denied

## 2022-04-29 DIAGNOSIS — Z98.1 S/P LUMBAR FUSION: ICD-10-CM

## 2022-05-01 NOTE — PROGRESS NOTES
Neurosurgery  Established Patient    SUBJECTIVE:     History of Present Illness: Rachel Zazueta is a 41 y.o. female with PMHx of Hepatitis C, liver cirrhosis, pancytopenia, and polysubstance abuse. She presented to clinic for follow up with new imaging s/p L3 and L4 laminectomy for evacuation of epidural abscess and L3-L5 TLIF on 04/16/2021. She was admitted from clinic on 02/14/2022 due to concerns with spondylodiscitis w/ GBS bacteremia s/p wound washout and L3-L5 hardware removal on 2/21/22. She then underwent re-instrumentation [id number] with Dr. Templeton on 3/2/22. The patient is being seen in clinic today for her 6 week post-op evaluation.    States that she continues to slowly improve. She remains in a lumbar brace supporting slowly resuming her activity at home, still not at her baseline physical abilities, but is eager to continue with PT. Reports pain in the right-side extending into the buttock. She has been compliant with the TLSO brace and antibiotics; followed by ID. Denies fever or chills.     Review of patient's allergies indicates:   Allergen Reactions    Bee venom protein (honey bee) Anaphylaxis     Patient reports she is allergic to bee stings.    Wasp sting [allergen ext-venom-honey bee] Anaphylaxis    Adhesive Blisters    Iodine and iodide containing products Hives     Allergic to iodine in seafood only    Naproxen Other (See Comments)     Throat closing    Shellfish containing products     Nuts [tree nut] Rash       Current Outpatient Medications   Medication Sig Dispense Refill    acetaminophen (TYLENOL) 325 MG tablet Take 2 tablets (650 mg total) by mouth every 6 (six) hours as needed for Temperature greater than (100.4 F).  0    amoxicillin (AMOXIL) 500 MG capsule Take 2 capsules (1,000 mg total) by mouth every 12 (twelve) hours. 120 capsule 2    ciprofloxacin HCl (CIPRO) 500 MG tablet Take 1 tablet (500 mg total) by mouth every 12 (twelve) hours. for 7 days 14 tablet 0     furosemide (LASIX) 20 MG tablet Take 1 tablet (20 mg total) by mouth 2 (two) times daily. for 14 days 28 tablet 0    oxyCODONE-acetaminophen (PERCOCET)  mg per tablet Take 1 tablet by mouth every 6 (six) hours as needed for Pain. 30 tablet 0    pantoprazole (PROTONIX) 40 MG tablet Take 1 tablet (40 mg total) by mouth once daily. 30 tablet 1    polyethylene glycol (GLYCOLAX) 17 gram/dose powder Take 17 g by mouth once daily. 510 g 1    pregabalin (LYRICA) 75 MG capsule Take 1 capsule (75 mg total) by mouth 3 (three) times daily. 60 capsule 1    sertraline (ZOLOFT) 50 MG tablet Take 2 tablets (100 mg total) by mouth once daily. 60 tablet 1     No current facility-administered medications for this visit.       Past Medical History:   Diagnosis Date    Anemia     Diskitis     Hep C w/o coma, chronic     IV drug abuse     Kidney stone     Liver cirrhosis      Past Surgical History:   Procedure Laterality Date    BACK SURGERY      benine tumor removal      forehead, age 9    CHOLECYSTECTOMY      KIDNEY SURGERY      LUMBAR FUSION Bilateral 3/2/2022    Procedure: FUSION, SPINE, LUMBAR;  Surgeon: Guille Templeton MD;  Location: [address];  Service: Neurosurgery;  Laterality: Bilateral;  AIRO  T10--Pelvis    REMOVAL OF HARDWARE FROM SPINE N/A 2/21/2022    Procedure: REMOVAL, HARDWARE, SPINE;  Surgeon: Guille Templeton MD;  Location: [facility] OR [address] [place] [address];  Service: Neurosurgery;  Laterality: N/A;  [place]    SPINE SURGERY       Family History     Problem Relation (Age of Onset)    Drug abuse Mother    Hepatitis Mother        Social History     Socioeconomic History    Marital status:    Tobacco Use    Smoking status: Current Some Day Smoker     Packs/day: 0.50     Years: 23.00     Pack years: 11.50     Types: Cigarettes    Smokeless tobacco: Never Used    Tobacco comment: patient states she knows she nees to quit.   Substance and Sexual Activity    Alcohol use: Not Currently    Drug use: Yes      Frequency: 4.0 times per week     Types: Marijuana     Comment: Patient denies needle use, only inhalation of methampehtamines or orally.  Last known drug use was prior to admission to hospital stay for surgery    Sexual activity: Yes     Partners: Male     Birth control/protection: None     Social Determinants of Health     Financial Resource Strain: Low Risk     Difficulty of Paying Living Expenses: Not very hard   Food Insecurity: Food Insecurity Present    Worried About Running Out of Food in the Last Year: Sometimes true    Ran Out of Food in the Last Year: Never true   Transportation Needs: Unmet Transportation Needs    Lack of Transportation (Medical): Yes    Lack of Transportation (Non-Medical): No   Physical Activity: Inactive    Days of Exercise per Week: 0 days    Minutes of Exercise per Session: 0 min   Stress: Stress Concern Present    Feeling of Stress : Rather much   Social Connections: Unknown    Frequency of Communication with Friends and Family: Once a week    Frequency of Social Gatherings with Friends and Family: Never    Active Member of Clubs or Organizations: No    Attends Club or Organization Meetings: Never    Marital Status: Living with partner   Housing Stability: Low Risk     Unable to Pay for Housing in the Last Year: No    Number of Places Lived in the Last Year: 2    Unstable Housing in the Last Year: No       Review of Systems   Constitutional: Negative for activity change, appetite change and fever.   HENT: Negative for ear discharge.    Eyes: Negative for pain and visual disturbance.   Respiratory: Negative for cough, chest tightness and shortness of breath.    Cardiovascular: Negative for chest pain and palpitations.   Gastrointestinal: Negative for abdominal distention and abdominal pain.   Endocrine: Negative for polydipsia, polyphagia and polyuria.   Musculoskeletal: Positive for back pain and myalgias. Negative for arthralgias and neck pain.   Skin:  Negative for color change and wound.   Neurological: Positive for weakness. Negative for dizziness, numbness and headaches.   Psychiatric/Behavioral: Negative for behavioral problems, confusion and dysphoric mood.       OBJECTIVE:     Vital Signs     There is no height or weight on file to calculate BMI.    Neurosurgery Physical Exam  General: well developed, well nourished, no distress.   Head: normocephalic, atraumatic  Neurologic: Alert and oriented. Thought content appropriate.  GCS: Motor: 6/Verbal: 5/Eyes: 4 GCS Total: 15  Mental Status: Awake, Alert, Oriented x 4  Language: No aphasia  Speech: No dysarthria  Cranial nerves: face symmetric, tongue midline, CN II-XII grossly intact.   Eyes: pupils equal, round, reactive to light with accomodation, EOMI.   Pulmonary: normal respirations, no signs of respiratory distress  Abdomen: soft, non-distended, not tender to palpation  Skin: Skin is warm, dry and intact. Incision well-healed  Sensory: intact to light touch throughout  Motor Strength:Moves all extremities spontaneously with good tone.    Strength  Deltoids Triceps Biceps Wrist Extension Wrist Flexion Hand    Upper: R 5/5 5/5 5/5 5/5 5/5 5/5    L 5/5 5/5 5/5 5/5 5/5 5/5     HF KE KF DF PF EHL   Lower: R 4/5 4/5 4/5 4/5 4/5 5/5    L 4/5 4/5 4/5 4/5 4/5 5/5     Cerebellar:   Gait stable, antalgic with use of walker     Cervical:   ROM: Full with flexion, extension, lateral rotation and ear-to-shoulder bend.   Midline TTP: Negative.     Thoracic:  Midline TTP: Negative.     Lumbar:  Midline TTP: Negative.  Straight Leg Test: Negative.    Other:  SI joint TTP: Positive bilaterally  Greater trochanter TTP: Negative.  Tenderness with external/internal hip rotation: Negative.    Diagnostic Results:  I have personally reviewed the x-ray scoliosis dated 4/14/22 with Dr. Templeton. The imaging shows T10-pelvis instrumented fusion in stable position. Concern for PJK at T9-10.     ASSESSMENT/PLAN:   Rachel Zazueta  is a 41 y.o. female with PMHx of Hepatitis C, liver cirrhosis, pancytopenia, and polysubstance abuse. She presented to clinic for follow up with new imaging s/p L3 and L4 laminectomy for evacuation of epidural abscess and L3-L5 TLIF on 04/16/2021. She was admitted from clinic on 02/14/2022 due to concerns with spondylodiscitis w/ GBS bacteremia s/p wound washout and L3-L5 hardware removal on 2/21/22. She then underwent re-instrumentation [id number] with Dr. Templeton on 3/2/22. The patient is being seen in clinic today for her 6 week post-op evaluation. Overall, she continues to improve slowly. She has been followed by ID and remains compliant with antibiotics. I have ordered PT to assist with her mobility and pain and posture. A CT thoracolumbar spine has been ordered for her 3 month post-op evaluation to assess for bony fusion.     I would like the patient to follow-up in clinic in 6 weeks with the CT imaging to review the findings. I have encouraged her to contact the clinic with any questions, concerns, or adverse clinical changes. She verbalized understanding.     HENRI Robison-RHIANNA  Neurosurgery  [facility] [place].     Note dictated with voice recognition software, please excuse any grammatical errors.

## 2022-05-02 ENCOUNTER — PATIENT MESSAGE (OUTPATIENT)
Dept: NEUROSURGERY | Facility: CLINIC | Age: 42
End: 2022-05-02
Payer: MEDICAID

## 2022-05-02 PROBLEM — Z13.6 ENCOUNTER FOR LIPID SCREENING FOR CARDIOVASCULAR DISEASE: Status: RESOLVED | Noted: 2022-01-27 | Resolved: 2022-05-02

## 2022-05-02 PROBLEM — Z13.220 ENCOUNTER FOR LIPID SCREENING FOR CARDIOVASCULAR DISEASE: Status: RESOLVED | Noted: 2022-01-27 | Resolved: 2022-05-02

## 2022-05-02 RX ORDER — OXYCODONE AND ACETAMINOPHEN 10; 325 MG/1; MG/1
1 TABLET ORAL EVERY 6 HOURS PRN
Qty: 30 TABLET | Refills: 0 | Status: SHIPPED | OUTPATIENT
Start: 2022-05-02 | End: 2022-05-11 | Stop reason: SDUPTHER

## 2022-05-11 ENCOUNTER — DOCUMENTATION ONLY (OUTPATIENT)
Dept: REHABILITATION | Facility: HOSPITAL | Age: 42
End: 2022-05-11
Payer: MEDICAID

## 2022-05-11 ENCOUNTER — PATIENT MESSAGE (OUTPATIENT)
Dept: NEUROSURGERY | Facility: CLINIC | Age: 42
End: 2022-05-11
Payer: MEDICAID

## 2022-05-11 NOTE — PROGRESS NOTES
Physical Therapy      Patient Name:  Rachel Zazueta   MRN:  [id number]    Patient not seen at this time due to pt cancelled electronically. Will follow-up as needed.    Yahaira Ortiz, JEFF  5/11/2022

## 2022-05-13 ENCOUNTER — DOCUMENTATION ONLY (OUTPATIENT)
Dept: REHABILITATION | Facility: HOSPITAL | Age: 42
End: 2022-05-13
Payer: MEDICAID

## 2022-05-13 NOTE — PROGRESS NOTES
Patient was no call, no show for PT appt's on 5/5/2022 and 5/9/2022 and cancelled appt's on 5/6/2022 and 5/11/2022.

## 2022-05-16 ENCOUNTER — TELEPHONE (OUTPATIENT)
Dept: NEUROSURGERY | Facility: CLINIC | Age: 42
End: 2022-05-16
Payer: MEDICAID

## 2022-05-16 NOTE — TELEPHONE ENCOUNTER
Informed patient that her insurance company is the hold up with her medication not us patient verbally understood.

## 2022-05-16 NOTE — TELEPHONE ENCOUNTER
----- Message from Kamille Butler sent at 5/16/2022  4:12 PM CDT -----  Type: Requesting to speak with nurse         Who Called: PT  Regarding: patient needing a PA on her oxyCODONE-acetaminophen (PERCOCET)  mg per tablet  [facility] will not allow her to pay cash- while she waits for the PA because that's an extra 3 days she is without medication  She would like it to be sent to another pharmacy below if possible please advise   Would the patient rather a call back or a response via [facility]? Call back  Best Call Back Number: [phone]  Additional Information: [facility] at [address] [place] [phone]

## 2022-05-17 ENCOUNTER — DOCUMENTATION ONLY (OUTPATIENT)
Dept: REHABILITATION | Facility: HOSPITAL | Age: 42
End: 2022-05-17
Payer: MEDICAID

## 2022-05-17 NOTE — PROGRESS NOTES
PT called patient due to frequent cancellations. Patient reported they have not had a vehicle for the past 21 days and she did not have a ride to PT intervention. Patient reported they are supposed to get vehicle back today or tomorrow. Patient is interested in continuing with PT when she is able to have a ride to therapy. Patient encouraged to call PT office ASAP when she finds out about transportation to get back on PT schedule.

## 2022-05-18 DIAGNOSIS — Z98.1 S/P LUMBAR FUSION: ICD-10-CM

## 2022-05-18 RX ORDER — OXYCODONE AND ACETAMINOPHEN 10; 325 MG/1; MG/1
1 TABLET ORAL EVERY 6 HOURS PRN
Qty: 30 TABLET | Refills: 0 | Status: SHIPPED | OUTPATIENT
Start: 2022-05-18 | End: 2022-05-27 | Stop reason: SDUPTHER

## 2022-05-24 ENCOUNTER — TELEPHONE (OUTPATIENT)
Dept: REHABILITATION | Facility: HOSPITAL | Age: 42
End: 2022-05-24
Payer: MEDICAID

## 2022-05-24 NOTE — TELEPHONE ENCOUNTER
PT called patient and left message to return call.   PT was calling patient as she was supposed to call back by last Friday to discuss rescheduling PT appts as patient was having transportation issues and was hoping they would be resolved by last Friday.

## 2022-05-26 ENCOUNTER — PATIENT MESSAGE (OUTPATIENT)
Dept: NEUROSURGERY | Facility: CLINIC | Age: 42
End: 2022-05-26
Payer: MEDICAID

## 2022-05-26 NOTE — TELEPHONE ENCOUNTER
Informed patient that her insurance company keep denying her medication patient verbally understood.

## 2022-05-27 DIAGNOSIS — Z98.1 S/P LUMBAR FUSION: ICD-10-CM

## 2022-05-27 RX ORDER — OXYCODONE AND ACETAMINOPHEN 10; 325 MG/1; MG/1
1 TABLET ORAL EVERY 6 HOURS PRN
Qty: 28 TABLET | Refills: 0 | Status: SHIPPED | OUTPATIENT
Start: 2022-05-27 | End: 2022-06-03

## 2022-06-03 ENCOUNTER — PATIENT MESSAGE (OUTPATIENT)
Dept: NEUROSURGERY | Facility: CLINIC | Age: 42
End: 2022-06-03
Payer: MEDICAID

## 2022-06-03 DIAGNOSIS — F33.0 MILD EPISODE OF RECURRENT MAJOR DEPRESSIVE DISORDER: ICD-10-CM

## 2022-06-03 DIAGNOSIS — Z98.1 S/P LUMBAR FUSION: ICD-10-CM

## 2022-06-03 RX ORDER — PREGABALIN 75 MG/1
75 CAPSULE ORAL 3 TIMES DAILY
Qty: 90 CAPSULE | Refills: 0 | Status: SHIPPED | OUTPATIENT
Start: 2022-06-03 | End: 2022-06-16 | Stop reason: SDUPTHER

## 2022-06-04 RX ORDER — SERTRALINE HYDROCHLORIDE 50 MG/1
100 TABLET, FILM COATED ORAL DAILY
Qty: 60 TABLET | Refills: 1 | Status: SHIPPED | OUTPATIENT
Start: 2022-06-04 | End: 2022-06-16 | Stop reason: SDUPTHER

## 2022-06-06 ENCOUNTER — TELEPHONE (OUTPATIENT)
Dept: PRIMARY CARE CLINIC | Facility: CLINIC | Age: 42
End: 2022-06-06
Payer: MEDICAID

## 2022-06-06 ENCOUNTER — DOCUMENTATION ONLY (OUTPATIENT)
Dept: REHABILITATION | Facility: HOSPITAL | Age: 42
End: 2022-06-06
Payer: MEDICAID

## 2022-06-06 ENCOUNTER — PATIENT MESSAGE (OUTPATIENT)
Dept: NEUROSURGERY | Facility: CLINIC | Age: 42
End: 2022-06-06
Payer: MEDICAID

## 2022-06-06 DIAGNOSIS — Z98.1 S/P LUMBAR FUSION: Primary | ICD-10-CM

## 2022-06-06 NOTE — TELEPHONE ENCOUNTER
If patient is experiencing new pain from her last surgery, then she will need to be evaluated in clinic. Otherwise, for post-operative pain, she will need to follow up pain management as suggested by her surgeon.

## 2022-06-06 NOTE — TELEPHONE ENCOUNTER
Patient states she is 3 weeks post op and would like her pain meds called in to [facility].  She states her surgeon told her to call her pcp

## 2022-06-06 NOTE — PROGRESS NOTES
PHYSICAL THERAPY DISCHARGE:    This patient was originally evaluated at our facility on: 4/26/2022         Pt attended PT for evaluation only and did not attend any other follow up appt.    Pt was DC'd from our care secondary to: patient only attend initial evaluation and did not return for treatment visits. PT is available for re-consult at later date if appropriate.        Therapist: Candy Waldron, PT  6/6/2022

## 2022-06-08 ENCOUNTER — SPECIALTY PHARMACY (OUTPATIENT)
Dept: PHARMACY | Facility: CLINIC | Age: 42
End: 2022-06-08

## 2022-06-09 NOTE — TELEPHONE ENCOUNTER
AG Epclusa x 12 weeks received. Patient has [employer] - AG Epclusa is covered but normally with plan limit- possibly into PA for extension. Test claim $0    Msg provider for clarification on extending to 24 weeks instead due to decompensation cirrhosis status.

## 2022-06-13 ENCOUNTER — TELEPHONE (OUTPATIENT)
Dept: PRIMARY CARE CLINIC | Facility: CLINIC | Age: 42
End: 2022-06-13
Payer: MEDICAID

## 2022-06-13 NOTE — TELEPHONE ENCOUNTER
Called patient to pre register for virtual, no answer left a message. Patient left virtual visit. I called to ask patient if she would like to reschedule appt no answer, left message.

## 2022-06-16 NOTE — TELEPHONE ENCOUNTER
Pending referral as prescriber inform wanting to consult hepatology prior to initiating treatment and duration

## 2022-06-24 NOTE — PROGRESS NOTES
The patient location is: [place], [place]  The chief complaint leading to consultation is: Antibiotic follow up     Visit type: audiovisual    Face to Face time with patient: 25 mins   45 minutes of total time spent on the encounter, which includes face to face time and non-face to face time preparing to see the patient (eg, review of tests), Obtaining and/or reviewing separately obtained history, Documenting clinical information in the electronic or other health record, Independently interpreting results (not separately reported) and communicating results to the patient/family/caregiver, or Care coordination (not separately reported).       Each patient to whom he or she provides medical services by telemedicine is:  (1) informed of the relationship between the physician and patient and the respective role of any other health care provider with respect to management of the patient; and (2) notified that he or she may decline to receive medical services by telemedicine and may withdraw from such care at any time.      Subjective:       Patient ID: Rachel Zazueta is a 41 y.o. female.    Chief Complaint: No chief complaint on file.    HPI     Ms. Zazueta is a 41 year old female with PMH of hepatitis C, liver cirrhosis, pancytopenia, polysubstance abuse, and spinal infection with GBS bacteremia, s/p hardware removal on 3/2 with placement of titanium cage in disc space (operative cultures negative) who presents to clinic for an antibiotic follow up. Following 3/2 surgery, pt was discharged home on ceftriaxone, she completed about 5 weeks of therapy. Pt was seen virtually by Dr. Doe in April following being off abx after 1 week after pt's line fell out. During that visit, Dr. Doe transitioned her to oral therapy for suppression given partially retained spinal hardware w/ GBS infection.     Pt is being seen today for an antibiotic follow up. She remains taking amoxicillin BID since April. Reports being compliant,  only missing one day of therapy thus far. Denies negative side effects from Amoxicillin therapy. Denies complaints, states she is feeling pretty good overall. Denies fever, chills, body aches, N/V/D. States incision is healing well, denies drainage, swelling or tenderness to the area. Denies neck pain/stiffness, headaches. States she is having hip pain, but denies neurosurgery follow up. CT lumbar/thoracic spine ordered per Janet Morales NP but not completed per pt yet. Pt stated she was unaware of this test ordered and that she needed to follow up but reported that she would contact them and schedule a follow up.      Review of Systems   Constitutional: Negative for activity change, chills, diaphoresis, fatigue and fever.   Respiratory: Negative for shortness of breath.    Cardiovascular: Negative for chest pain.   Gastrointestinal: Negative for abdominal pain, constipation, diarrhea, nausea and vomiting.   Musculoskeletal: Negative for arthralgias, back pain and myalgias.        Reports hip pain    Neurological: Negative for weakness and headaches.        Denies neck pain/stiffness   Psychiatric/Behavioral: Negative for agitation and confusion. The patient is not hyperactive.          Objective:      Physical Exam  Vitals reviewed: exam limited d/t virtual exam    Constitutional:       General: She is not in acute distress.     Appearance: Normal appearance. She is normal weight. She is not ill-appearing, toxic-appearing or diaphoretic.   HENT:      Head: Normocephalic.   Eyes:      Conjunctiva/sclera: Conjunctivae normal.   Pulmonary:      Effort: Pulmonary effort is normal. No respiratory distress.      Breath sounds: Normal breath sounds.   Neurological:      General: No focal deficit present.      Mental Status: She is alert and oriented to person, place, and time. Mental status is at baseline.   Psychiatric:         Mood and Affect: Mood normal.         Behavior: Behavior normal.         Thought Content:  Thought content normal.         Judgment: Judgment normal.         Assessment:       Problem List Items Addressed This Visit        ID    Abscess in epidural space of lumbar spine    Relevant Orders    CBC Auto Differential    Comprehensive Metabolic Panel    C-reactive protein    Spondylodiscitis - Primary    Relevant Orders    CBC Auto Differential    Comprehensive Metabolic Panel    C-reactive protein    Streptococcal bacteremia    Relevant Orders    CBC Auto Differential    Comprehensive Metabolic Panel    C-reactive protein      Other Visit Diagnoses     Chronic antibiotic suppression        Relevant Orders    CBC Auto Differential    Comprehensive Metabolic Panel    C-reactive protein          Plan:       Recommendations:  - Continue PO Amoxicillin 1000 mg BID, per Dr. Doe for suppression therapy.   - Ordered CRP, CMP, CBC.    - Follow up with Janet Morales NP with neurosurgery.   - Complete lumbar/thoracic CT that was ordered per neurosurgery.   - RTC in 1 month. Ultimate suppressive abx therapy duration to be determined per Dr. Doe.   - Call or seek immediate medical attention for any fevers, chills, sweats, diarrhea, n/v or increase in pain, swelling, drainage from wound.       The total time for evaluation and management services performed on 6/28/22 was greater than 45 minutes.      45 minutes of total time was spent on this encounter, which includes face to face time and non-face to face time preparing to see the patient (eg, review of tests), Obtaining and/or reviewing separately obtained history, documenting clinical information in the electronic or other health record, independently interpreting results (not separately reported) and communicating results to the patient/family/caregiver, or care coordination (not separately reported).

## 2022-06-28 ENCOUNTER — OFFICE VISIT (OUTPATIENT)
Dept: INFECTIOUS DISEASES | Facility: CLINIC | Age: 42
End: 2022-06-28
Payer: MEDICAID

## 2022-06-28 DIAGNOSIS — G06.1 ABSCESS IN EPIDURAL SPACE OF LUMBAR SPINE: ICD-10-CM

## 2022-06-28 DIAGNOSIS — R78.81 STREPTOCOCCAL BACTEREMIA: ICD-10-CM

## 2022-06-28 DIAGNOSIS — M46.40 SPONDYLODISCITIS: Primary | ICD-10-CM

## 2022-06-28 DIAGNOSIS — Z79.2 CHRONIC ANTIBIOTIC SUPPRESSION: ICD-10-CM

## 2022-06-28 DIAGNOSIS — B95.5 STREPTOCOCCAL BACTEREMIA: ICD-10-CM

## 2022-06-28 PROCEDURE — 99214 PR OFFICE/OUTPT VISIT, EST, LEVL IV, 30-39 MIN: ICD-10-PCS | Mod: 95,,, | Performed by: REGISTERED NURSE

## 2022-06-28 PROCEDURE — 1159F MED LIST DOCD IN RCRD: CPT | Mod: CPTII,95,, | Performed by: REGISTERED NURSE

## 2022-06-28 PROCEDURE — 1159F PR MEDICATION LIST DOCUMENTED IN MEDICAL RECORD: ICD-10-PCS | Mod: CPTII,95,, | Performed by: REGISTERED NURSE

## 2022-06-28 PROCEDURE — 1160F PR REVIEW ALL MEDS BY PRESCRIBER/CLIN PHARMACIST DOCUMENTED: ICD-10-PCS | Mod: CPTII,95,, | Performed by: REGISTERED NURSE

## 2022-06-28 PROCEDURE — 1160F RVW MEDS BY RX/DR IN RCRD: CPT | Mod: CPTII,95,, | Performed by: REGISTERED NURSE

## 2022-06-28 PROCEDURE — 99214 OFFICE O/P EST MOD 30 MIN: CPT | Mod: 95,,, | Performed by: REGISTERED NURSE

## 2022-06-28 NOTE — Clinical Note
Hi, do you mind reaching out to this patient to see where she wants labs done and schedule them please? She lives in [place]. Also can you make an appt for follow up in 1 month with Dr. Doe if available? Virtual is fine. Thank you!

## 2022-07-01 ENCOUNTER — LAB VISIT (OUTPATIENT)
Dept: LAB | Facility: HOSPITAL | Age: 42
End: 2022-07-01
Attending: REGISTERED NURSE
Payer: MEDICAID

## 2022-07-01 DIAGNOSIS — B95.5 STREPTOCOCCAL BACTEREMIA: ICD-10-CM

## 2022-07-01 DIAGNOSIS — M46.40 SPONDYLODISCITIS: ICD-10-CM

## 2022-07-01 DIAGNOSIS — Z79.2 CHRONIC ANTIBIOTIC SUPPRESSION: ICD-10-CM

## 2022-07-01 DIAGNOSIS — G06.1 ABSCESS IN EPIDURAL SPACE OF LUMBAR SPINE: ICD-10-CM

## 2022-07-01 DIAGNOSIS — R78.81 STREPTOCOCCAL BACTEREMIA: ICD-10-CM

## 2022-07-01 LAB
ALBUMIN SERPL BCP-MCNC: 2.9 G/DL (ref 3.5–5.2)
ALP SERPL-CCNC: 165 U/L (ref 55–135)
ALT SERPL W/O P-5'-P-CCNC: 29 U/L (ref 10–44)
ANION GAP SERPL CALC-SCNC: 3 MMOL/L (ref 8–16)
AST SERPL-CCNC: 59 U/L (ref 10–40)
BASOPHILS # BLD AUTO: 0.03 K/UL (ref 0–0.2)
BASOPHILS NFR BLD: 0.9 % (ref 0–1.9)
BILIRUB SERPL-MCNC: 2.2 MG/DL (ref 0.1–1)
BUN SERPL-MCNC: 12 MG/DL (ref 6–20)
CALCIUM SERPL-MCNC: 8.5 MG/DL (ref 8.7–10.5)
CHLORIDE SERPL-SCNC: 106 MMOL/L (ref 95–110)
CO2 SERPL-SCNC: 29 MMOL/L (ref 23–29)
CREAT SERPL-MCNC: 0.7 MG/DL (ref 0.5–1.4)
CRP SERPL-MCNC: 1.53 MG/DL (ref 0–0.75)
DIFFERENTIAL METHOD: ABNORMAL
EOSINOPHIL # BLD AUTO: 0.1 K/UL (ref 0–0.5)
EOSINOPHIL NFR BLD: 3.2 % (ref 0–8)
ERYTHROCYTE [DISTWIDTH] IN BLOOD BY AUTOMATED COUNT: 16.8 % (ref 11.5–14.5)
EST. GFR  (AFRICAN AMERICAN): >60 ML/MIN/1.73 M^2
EST. GFR  (NON AFRICAN AMERICAN): >60 ML/MIN/1.73 M^2
GLUCOSE SERPL-MCNC: 82 MG/DL (ref 70–110)
HCT VFR BLD AUTO: 32.5 % (ref 37–48.5)
HGB BLD-MCNC: 10.5 G/DL (ref 12–16)
IMM GRANULOCYTES # BLD AUTO: 0.01 K/UL (ref 0–0.04)
IMM GRANULOCYTES NFR BLD AUTO: 0.3 % (ref 0–0.5)
LYMPHOCYTES # BLD AUTO: 0.8 K/UL (ref 1–4.8)
LYMPHOCYTES NFR BLD: 22.3 % (ref 18–48)
MCH RBC QN AUTO: 27.7 PG (ref 27–31)
MCHC RBC AUTO-ENTMCNC: 32.3 G/DL (ref 32–36)
MCV RBC AUTO: 86 FL (ref 82–98)
MONOCYTES # BLD AUTO: 0.4 K/UL (ref 0.3–1)
MONOCYTES NFR BLD: 10.1 % (ref 4–15)
NEUTROPHILS # BLD AUTO: 2.2 K/UL (ref 1.8–7.7)
NEUTROPHILS NFR BLD: 63.2 % (ref 38–73)
NRBC BLD-RTO: 0 /100 WBC
PLATELET # BLD AUTO: 82 K/UL (ref 150–450)
PMV BLD AUTO: 8.7 FL (ref 9.2–12.9)
POTASSIUM SERPL-SCNC: 3.5 MMOL/L (ref 3.5–5.1)
PROT SERPL-MCNC: 7.7 G/DL (ref 6–8.4)
RBC # BLD AUTO: 3.79 M/UL (ref 4–5.4)
SODIUM SERPL-SCNC: 138 MMOL/L (ref 136–145)
WBC # BLD AUTO: 3.45 K/UL (ref 3.9–12.7)

## 2022-07-01 PROCEDURE — 86140 C-REACTIVE PROTEIN: CPT | Performed by: REGISTERED NURSE

## 2022-07-01 PROCEDURE — 80053 COMPREHEN METABOLIC PANEL: CPT | Performed by: REGISTERED NURSE

## 2022-07-01 PROCEDURE — 36415 COLL VENOUS BLD VENIPUNCTURE: CPT | Performed by: REGISTERED NURSE

## 2022-07-01 PROCEDURE — 85025 COMPLETE CBC W/AUTO DIFF WBC: CPT | Performed by: REGISTERED NURSE

## 2022-07-18 ENCOUNTER — TELEPHONE (OUTPATIENT)
Dept: NEUROSURGERY | Facility: CLINIC | Age: 42
End: 2022-07-18
Payer: MEDICAID

## 2022-07-18 ENCOUNTER — HOSPITAL ENCOUNTER (EMERGENCY)
Facility: HOSPITAL | Age: 42
Discharge: HOME | End: 2022-07-18
Attending: STUDENT IN AN ORGANIZED HEALTH CARE EDUCATION/TRAINING PROGRAM
Payer: MEDICAID

## 2022-07-18 VITALS
BODY MASS INDEX: 41.59 KG/M2 | HEIGHT: 67 IN | SYSTOLIC BLOOD PRESSURE: 131 MMHG | HEART RATE: 99 BPM | DIASTOLIC BLOOD PRESSURE: 69 MMHG | TEMPERATURE: 98 F | RESPIRATION RATE: 16 BRPM | WEIGHT: 265 LBS | OXYGEN SATURATION: 100 %
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
## 2022-07-18 DIAGNOSIS — M25.552 LEFT HIP PAIN: Primary | ICD-10-CM

## 2022-07-18 PROCEDURE — 99283 EMERGENCY DEPT VISIT LOW MDM: CPT | Mod: 25

## 2022-07-18 RX ORDER — CYCLOBENZAPRINE HCL 10 MG
10 TABLET ORAL 3 TIMES DAILY PRN
Qty: 15 TABLET | Refills: 0 | Status: SHIPPED | OUTPATIENT
Start: 2022-07-18 | End: 2022-07-23

## 2022-07-18 NOTE — ED PROVIDER NOTES
"Encounter Date: 7/18/2022       History     Chief Complaint   Patient presents with    Back Pain     Reports fell a couple days ago, pain to left side of back and left hip. Also states had back surgery in March 2022 and "my dr recommended me come in and make sure nothing got out of place when I fell"     41 year old female with PMH of hepatitis C, liver cirrhosis, pancytopenia, polysubstance abuse, and spinal infection with GBS bacteremia, s/p hardware removal on 3/2 with placement of titanium cage in disc space (operative cultures negative) presents with left hip pain after mechanical fall due to uneven ground.  Patient has been ambulating with some difficulty due to pain.  Has been trying over-the-counter medication with some relief.  Patient denies any head trauma, loss consciousness, or other injuries. denies any midline tenderness        Review of patient's allergies indicates:   Allergen Reactions    Bee venom protein (honey bee) Anaphylaxis     Patient reports she is allergic to bee stings.   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9    CHOLECYSTECTOMY      KIDNEY SURGERY      LUMBAR FUSION Bilateral 3/2/2022    Procedure: FUSION, SPINE, LUMBAR;  Surgeon: Guille Templeton MD;  Location: [facility] OR [address] [place] [address];  Service: Neurosurgery;  Laterality: Bilateral;  AIRO  T10--Pelvis    REMOVAL OF HARDWARE FROM SPINE N/A 2/21/2022    Procedure: REMOVAL, HARDWARE, SPINE;  Surgeon: Guille" MD Yokasta;  Location: [facility] OR [address] [place] [address];  Service: Neurosurgery;  Laterality: N/A;  [place]    SPINE SURGERY       Family History   Problem Relation Age of Onset    Hepatitis Mother     Drug abuse Mother     Liver cancer Mother     Drug abuse Father     Cirrhosis Father     Hepatitis Father      Social History     Tobacco Use    Smoking status: Current Some Day Smoker     Packs/day: 0.50     Years: 23.00     Pack years: 11.50     Types: Cigarettes    Smokeless tobacco: Never Used    Tobacco comment: patient states she knows she nees to quit.   Substance Use Topics    Alcohol use: Not Currently     Comment: quit 2014    Drug use: Yes     Frequency: 4.0 times per week     Types: Marijuana     Comment: Patient denies needle use, only inhalation of methampehtamines or orally.  Last known drug use was prior to admission to hospital stay for surgery     Review of Systems   Constitutional: Negative.    HENT: Negative.    Respiratory: Negative.    Cardiovascular: Negative.    Gastrointestinal: Negative.    Genitourinary: Negative.    Musculoskeletal: Positive for arthralgias, gait problem and myalgias.   Skin: Negative.    Psychiatric/Behavioral: Negative.    All other systems reviewed and are negative.      Physical Exam     Initial Vitals [07/18/22 0811]   BP Pulse Resp Temp SpO2   131/69 99 16 98 °F (36.7 °C) 100 %      MAP       --         Physical Exam    Nursing note and vitals reviewed.  Constitutional: Vital signs are normal. She appears well-developed and well-nourished.   HENT:   Head: Normocephalic and atraumatic.   Eyes: Conjunctivae and lids are normal.   Neck: Trachea normal. Neck supple.   Cardiovascular: Normal rate, regular rhythm, normal heart sounds, intact distal pulses and normal pulses.   No murmur heard.  Pulmonary/Chest: Breath sounds normal. No respiratory distress.   Abdominal: Abdomen is soft. Bowel sounds are normal.   Musculoskeletal:         General: Tenderness present. Normal range  of motion.      Cervical back: Neck supple.      Comments: Tenderness to palpation of left hip without any obvious deformity.  No midline tenderness or step-offs     Neurological: She is alert and oriented to person, place, and time. She has normal strength. No cranial nerve deficit or sensory deficit.   Skin: Skin is warm. Capillary refill takes less than 2 seconds.   Psychiatric: She has a normal mood and affect. Her speech is normal. Thought content normal.         ED Course   Procedures  Labs Reviewed - No data to display       Imaging Results          X-Ray Hip 2 or 3 views Left (with Pelvis when performed) (In process)                  Medications - No data to display  Medical Decision Making:   Initial Assessment:   41 year old female with PMH of hepatitis C, liver cirrhosis, pancytopenia, polysubstance abuse, and spinal infection with GBS bacteremia, s/p hardware removal on 3/2 with placement of titanium cage in disc space (operative cultures negative) presents with left hip pain after mechanical fall yesterday.  GCS 15. No obvious deformity.  Will get x-ray to rule out fracture.  Patient does not have any midline tenderness.  Patient already has follow-up for CT thoracic and lumbar palpation.  Will rule out any obvious deformity.  Most likely sent home with conservative management unless imaging remarkable  Clinical Tests:   Radiological Study: Ordered and Reviewed                      Clinical Impression:   Final diagnoses:  [W19.XXXA] Fall  [M25.552] Left hip pain (Primary)          ED Disposition Condition    Discharge Stable        ED Prescriptions     None        Follow-up Information     Follow up With Specialties Details Why Contact Info    Dannielle Merino DO Family Medicine In 2 days  [address] [facility]  [address] [place] [id number]  [phone]             Koby Mar MD  07/18/22 5857

## 2022-07-18 NOTE — Clinical Note
"Rachel Wolff (Michelle)blank was seen and treated in our emergency department on 7/18/2022.  She may return to work on 07/20/2022.       If you have any questions or concerns, please don't hesitate to call.      Koby Mar MD"

## 2022-07-22 ENCOUNTER — OFFICE VISIT (OUTPATIENT)
Dept: INFECTIOUS DISEASES | Facility: CLINIC | Age: 42
End: 2022-07-22
Payer: MEDICAID

## 2022-07-22 DIAGNOSIS — Z51.81 MEDICATION MONITORING ENCOUNTER: ICD-10-CM

## 2022-07-22 DIAGNOSIS — G06.1 ABSCESS IN EPIDURAL SPACE OF LUMBAR SPINE: Primary | ICD-10-CM

## 2022-07-22 PROCEDURE — 1160F RVW MEDS BY RX/DR IN RCRD: CPT | Mod: CPTII,95,, | Performed by: REGISTERED NURSE

## 2022-07-22 PROCEDURE — 1159F PR MEDICATION LIST DOCUMENTED IN MEDICAL RECORD: ICD-10-PCS | Mod: CPTII,95,, | Performed by: REGISTERED NURSE

## 2022-07-22 PROCEDURE — 1160F PR REVIEW ALL MEDS BY PRESCRIBER/CLIN PHARMACIST DOCUMENTED: ICD-10-PCS | Mod: CPTII,95,, | Performed by: REGISTERED NURSE

## 2022-07-22 PROCEDURE — 99214 PR OFFICE/OUTPT VISIT, EST, LEVL IV, 30-39 MIN: ICD-10-PCS | Mod: 95,,, | Performed by: REGISTERED NURSE

## 2022-07-22 PROCEDURE — 99214 OFFICE O/P EST MOD 30 MIN: CPT | Mod: 95,,, | Performed by: REGISTERED NURSE

## 2022-07-22 PROCEDURE — 1159F MED LIST DOCD IN RCRD: CPT | Mod: CPTII,95,, | Performed by: REGISTERED NURSE

## 2022-07-22 NOTE — PROGRESS NOTES
The patient location is: ***  The chief complaint leading to consultation is: ***    Visit type: {TELE AUDIOVISUAL:76570}

## 2022-07-22 NOTE — PROGRESS NOTES
The patient location is: [place], [place]   The chief complaint leading to consultation is: follow up     Visit type: audiovisual    Face to Face time with patient: 20  30 minutes of total time spent on the encounter, which includes face to face time and non-face to face time preparing to see the patient (eg, review of tests), Obtaining and/or reviewing separately obtained history, Documenting clinical information in the electronic or other health record, Independently interpreting results (not separately reported) and communicating results to the patient/family/caregiver, or Care coordination (not separately reported).         Each patient to whom he or she provides medical services by telemedicine is:  (1) informed of the relationship between the physician and patient and the respective role of any other health care provider with respect to management of the patient; and (2) notified that he or she may decline to receive medical services by telemedicine and may withdraw from such care at any time.          Subjective:       Patient ID: Rachel Zazueta is a 41 y.o. female.    Chief Complaint: No chief complaint on file.    HPI        Ms. Zazueta is a 41 year old female with PMH of hepatitis C, liver cirrhosis, pancytopenia, polysubstance abuse, and spinal infection with GBS bacteremia, s/p hardware removal on 3/2 with placement of titanium cage in disc space (operative cultures negative) who presents to clinic for an antibiotic follow up. Following 3/2 surgery, pt was discharged home on ceftriaxone, she completed about 5 weeks of therapy. Pt was seen virtually by Dr. Doe in April following being off abx after 1 week after pt's line fell out. During that visit, Dr. Doe transitioned her to oral therapy for suppression given partially retained spinal hardware w/ GBS infection.      Pt is being seen today for an antibiotic follow up. She remains taking amoxicillin BID since April. Reports being  compliant, only missing one day of therapy thus far. Denies negative side effects from Amoxicillin therapy. Denies complaints, states she is feeling pretty good overall. Denies fever, chills, body aches, N/V/D. States incision is healing well, denies drainage, swelling or tenderness to the area. Denies neck pain/stiffness, headaches. States she is having hip pain, but denies neurosurgery follow up. CT lumbar/thoracic spine ordered per Janet Morales NP but not completed per pt yet. Pt stated she was unaware of this test ordered and that she needed to follow up but reported that she would contact them and schedule a follow up.      7/22/22:    Pt is seen today for an antibiotic f/u. She remains taking amoxicillin BID. Reports compliance. Denies negative side effects from Amoxicillin therapy. Denies complaints, states she is feeling pretty good overall. Denies fever, chills, body aches, N/V/D. States issue from incision site. Denies neck pain/stiffness, headaches. States she feel last Friday and was evaluated by the ER. Xray negative. Plans to follow up with her primary care for hip pain. Recently had a positive Hep C test (5/2022), states she is following with GI and has an appt on 8/3/22 to start epclusa. Hep A IgG negative. Hep B surface Ag negative, Hep B core IgM negative. Reports being vaccinated in the past for both Hep A and Hep B.        Latest Reference Range & Units 05/03/22 11:43   Hep A IgM Negative  Negative   Hep B C IgM Negative  Negative   Hepatitis B Surface Ag Negative  Negative   Hepatitis C Ab Negative  Positive !   !: Data is abnormal      Review of Systems   Constitutional: Negative for appetite change, chills, diaphoresis, fatigue and fever.   Respiratory: Negative for cough and shortness of breath.    Cardiovascular: Negative for chest pain, palpitations and leg swelling.   Gastrointestinal: Negative for abdominal distention, constipation, diarrhea, nausea and vomiting.   Genitourinary:  Negative for difficulty urinating and dysuria.   Musculoskeletal: Positive for arthralgias (right hip pain ). Negative for back pain and myalgias.   Integumentary:  Negative for rash and wound.   Neurological: Negative for weakness and headaches.   Psychiatric/Behavioral: Negative for agitation and confusion. The patient is not nervous/anxious.          Objective:      Physical Exam      [facility] - audiovisual appt       Assessment:       Problem List Items Addressed This Visit        ID    Abscess in epidural space of lumbar spine - Primary    Relevant Orders    CBC Auto Differential    C-reactive protein    Comprehensive Metabolic Panel    Hepatitis B Surface Ab, Qualitative          Plan:       Recommendations:   - Continue PO Amoxicillin 1000 mg BID, per Dr. Doe for suppression therapy.   - Ordered CRP, CMP, CBC, HBsAg today.  - Follow up neurosurgery, complete lumbar/thoracic CT that was ordered per neurosurgery next week.  - Follow up with GI regarding Hep C treatment   - Hep A vaccination   - Hep B vaccination pending HBsAg.   - Call or seek immediate medical attention for any fevers, chills, sweats, diarrhea, n/v or increase in pain, swelling, drainage from wound.       30 minutes of total time was spent on this encounter, which includes face to face time and non-face to face time preparing to see the patient (eg, review of tests), Obtaining and/or reviewing separately obtained history, documenting clinical information in the electronic or other health record, independently interpreting results (not separately reported) and communicating results to the patient/family/caregiver, or care coordination (not separately reported).

## 2022-07-23 ENCOUNTER — LAB VISIT (OUTPATIENT)
Dept: LAB | Facility: HOSPITAL | Age: 42
End: 2022-07-23
Attending: REGISTERED NURSE
Payer: MEDICAID

## 2022-07-23 DIAGNOSIS — G06.1 ABSCESS IN EPIDURAL SPACE OF LUMBAR SPINE: ICD-10-CM

## 2022-07-23 DIAGNOSIS — Z51.81 MEDICATION MONITORING ENCOUNTER: ICD-10-CM

## 2022-07-23 LAB
ALBUMIN SERPL BCP-MCNC: 3 G/DL (ref 3.5–5.2)
ALP SERPL-CCNC: 177 U/L (ref 55–135)
ALT SERPL W/O P-5'-P-CCNC: 31 U/L (ref 10–44)
ANION GAP SERPL CALC-SCNC: 2 MMOL/L (ref 8–16)
AST SERPL-CCNC: 65 U/L (ref 10–40)
BASOPHILS # BLD AUTO: 0.05 K/UL (ref 0–0.2)
BASOPHILS NFR BLD: 0.9 % (ref 0–1.9)
BILIRUB SERPL-MCNC: 2.3 MG/DL (ref 0.1–1)
BUN SERPL-MCNC: 16 MG/DL (ref 6–20)
CALCIUM SERPL-MCNC: 8.3 MG/DL (ref 8.7–10.5)
CHLORIDE SERPL-SCNC: 103 MMOL/L (ref 95–110)
CO2 SERPL-SCNC: 30 MMOL/L (ref 23–29)
CREAT SERPL-MCNC: 0.7 MG/DL (ref 0.5–1.4)
CRP SERPL-MCNC: 1.93 MG/DL (ref 0–0.75)
DIFFERENTIAL METHOD: ABNORMAL
EOSINOPHIL # BLD AUTO: 0.2 K/UL (ref 0–0.5)
EOSINOPHIL NFR BLD: 2.8 % (ref 0–8)
ERYTHROCYTE [DISTWIDTH] IN BLOOD BY AUTOMATED COUNT: 16 % (ref 11.5–14.5)
EST. GFR  (AFRICAN AMERICAN): >60 ML/MIN/1.73 M^2
EST. GFR  (NON AFRICAN AMERICAN): >60 ML/MIN/1.73 M^2
GLUCOSE SERPL-MCNC: 91 MG/DL (ref 70–110)
HCT VFR BLD AUTO: 36.6 % (ref 37–48.5)
HGB BLD-MCNC: 11.9 G/DL (ref 12–16)
IMM GRANULOCYTES # BLD AUTO: 0.02 K/UL (ref 0–0.04)
IMM GRANULOCYTES NFR BLD AUTO: 0.3 % (ref 0–0.5)
LYMPHOCYTES # BLD AUTO: 1 K/UL (ref 1–4.8)
LYMPHOCYTES NFR BLD: 17.6 % (ref 18–48)
MCH RBC QN AUTO: 27.5 PG (ref 27–31)
MCHC RBC AUTO-ENTMCNC: 32.5 G/DL (ref 32–36)
MCV RBC AUTO: 85 FL (ref 82–98)
MONOCYTES # BLD AUTO: 0.6 K/UL (ref 0.3–1)
MONOCYTES NFR BLD: 11.1 % (ref 4–15)
NEUTROPHILS # BLD AUTO: 3.9 K/UL (ref 1.8–7.7)
NEUTROPHILS NFR BLD: 67.3 % (ref 38–73)
NRBC BLD-RTO: 0 /100 WBC
PLATELET # BLD AUTO: 121 K/UL (ref 150–450)
PMV BLD AUTO: 8.8 FL (ref 9.2–12.9)
POTASSIUM SERPL-SCNC: 3.8 MMOL/L (ref 3.5–5.1)
PROT SERPL-MCNC: 8.8 G/DL (ref 6–8.4)
RBC # BLD AUTO: 4.33 M/UL (ref 4–5.4)
SODIUM SERPL-SCNC: 135 MMOL/L (ref 136–145)
WBC # BLD AUTO: 5.79 K/UL (ref 3.9–12.7)

## 2022-07-23 PROCEDURE — 85025 COMPLETE CBC W/AUTO DIFF WBC: CPT | Performed by: REGISTERED NURSE

## 2022-07-23 PROCEDURE — 80053 COMPREHEN METABOLIC PANEL: CPT | Performed by: REGISTERED NURSE

## 2022-07-23 PROCEDURE — 86706 HEP B SURFACE ANTIBODY: CPT | Performed by: REGISTERED NURSE

## 2022-07-23 PROCEDURE — 86140 C-REACTIVE PROTEIN: CPT | Performed by: REGISTERED NURSE

## 2022-07-23 PROCEDURE — 36415 COLL VENOUS BLD VENIPUNCTURE: CPT | Performed by: REGISTERED NURSE

## 2022-07-25 ENCOUNTER — PATIENT MESSAGE (OUTPATIENT)
Dept: ADMINISTRATIVE | Facility: HOSPITAL | Age: 42
End: 2022-07-25
Payer: MEDICAID

## 2022-07-26 LAB — HBV SURFACE AB SER-ACNC: POSITIVE M[IU]/ML

## 2022-07-27 ENCOUNTER — TELEPHONE (OUTPATIENT)
Dept: INFECTIOUS DISEASES | Facility: HOSPITAL | Age: 42
End: 2022-07-27
Payer: MEDICAID

## 2022-07-27 NOTE — TELEPHONE ENCOUNTER
Phone call made to discuss abx therapy plans and recent lab work but no answer. Pt cancelled appt for 7/26/22 for repeat CT spine. Will attempt again tomorrow.

## 2022-08-19 ENCOUNTER — TELEPHONE (OUTPATIENT)
Dept: NEUROSURGERY | Facility: CLINIC | Age: 42
End: 2022-08-19
Payer: MEDICAID

## 2022-09-16 ENCOUNTER — PATIENT MESSAGE (OUTPATIENT)
Dept: NEUROSURGERY | Facility: CLINIC | Age: 42
End: 2022-09-16
Payer: MEDICAID

## 2022-09-29 ENCOUNTER — OFFICE VISIT (OUTPATIENT)
Dept: PRIMARY CARE CLINIC | Facility: CLINIC | Age: 42
End: 2022-09-29
Payer: MEDICAID

## 2022-09-29 VITALS
HEART RATE: 98 BPM | BODY MASS INDEX: 42.38 KG/M2 | RESPIRATION RATE: 14 BRPM | OXYGEN SATURATION: 99 % | HEIGHT: 67 IN | SYSTOLIC BLOOD PRESSURE: 139 MMHG | DIASTOLIC BLOOD PRESSURE: 78 MMHG | WEIGHT: 270 LBS | TEMPERATURE: 98 F

## 2022-09-29 DIAGNOSIS — F32.A DEPRESSION, UNSPECIFIED DEPRESSION TYPE: Primary | ICD-10-CM

## 2022-09-29 DIAGNOSIS — F19.90 SUBSTANCE USE DISORDER: ICD-10-CM

## 2022-09-29 DIAGNOSIS — Z23 NEED FOR INFLUENZA VACCINATION: ICD-10-CM

## 2022-09-29 DIAGNOSIS — K59.00 CONSTIPATION, UNSPECIFIED CONSTIPATION TYPE: ICD-10-CM

## 2022-09-29 DIAGNOSIS — S29.012D MUSCLE STRAIN OF RIGHT UPPER BACK, SUBSEQUENT ENCOUNTER: ICD-10-CM

## 2022-09-29 PROCEDURE — 3075F PR MOST RECENT SYSTOLIC BLOOD PRESS GE 130-139MM HG: ICD-10-PCS | Mod: CPTII,,, | Performed by: STUDENT IN AN ORGANIZED HEALTH CARE EDUCATION/TRAINING PROGRAM

## 2022-09-29 PROCEDURE — 3078F DIAST BP <80 MM HG: CPT | Mod: CPTII,,, | Performed by: STUDENT IN AN ORGANIZED HEALTH CARE EDUCATION/TRAINING PROGRAM

## 2022-09-29 PROCEDURE — 3008F BODY MASS INDEX DOCD: CPT | Mod: CPTII,,, | Performed by: STUDENT IN AN ORGANIZED HEALTH CARE EDUCATION/TRAINING PROGRAM

## 2022-09-29 PROCEDURE — 99999 PR PBB SHADOW E&M-EST. PATIENT-LVL IV: CPT | Mod: PBBFAC,,, | Performed by: STUDENT IN AN ORGANIZED HEALTH CARE EDUCATION/TRAINING PROGRAM

## 2022-09-29 PROCEDURE — 99214 OFFICE O/P EST MOD 30 MIN: CPT | Mod: PBBFAC,PN | Performed by: STUDENT IN AN ORGANIZED HEALTH CARE EDUCATION/TRAINING PROGRAM

## 2022-09-29 PROCEDURE — 3075F SYST BP GE 130 - 139MM HG: CPT | Mod: CPTII,,, | Performed by: STUDENT IN AN ORGANIZED HEALTH CARE EDUCATION/TRAINING PROGRAM

## 2022-09-29 PROCEDURE — 99214 PR OFFICE/OUTPT VISIT, EST, LEVL IV, 30-39 MIN: ICD-10-PCS | Mod: S$PBB,,, | Performed by: STUDENT IN AN ORGANIZED HEALTH CARE EDUCATION/TRAINING PROGRAM

## 2022-09-29 PROCEDURE — 99999 PR PBB SHADOW E&M-EST. PATIENT-LVL IV: ICD-10-PCS | Mod: PBBFAC,,, | Performed by: STUDENT IN AN ORGANIZED HEALTH CARE EDUCATION/TRAINING PROGRAM

## 2022-09-29 PROCEDURE — 1159F PR MEDICATION LIST DOCUMENTED IN MEDICAL RECORD: ICD-10-PCS | Mod: CPTII,,, | Performed by: STUDENT IN AN ORGANIZED HEALTH CARE EDUCATION/TRAINING PROGRAM

## 2022-09-29 PROCEDURE — 3078F PR MOST RECENT DIASTOLIC BLOOD PRESSURE < 80 MM HG: ICD-10-PCS | Mod: CPTII,,, | Performed by: STUDENT IN AN ORGANIZED HEALTH CARE EDUCATION/TRAINING PROGRAM

## 2022-09-29 PROCEDURE — 90686 IIV4 VACC NO PRSV 0.5 ML IM: CPT | Mod: PBBFAC,PN

## 2022-09-29 PROCEDURE — 3008F PR BODY MASS INDEX (BMI) DOCUMENTED: ICD-10-PCS | Mod: CPTII,,, | Performed by: STUDENT IN AN ORGANIZED HEALTH CARE EDUCATION/TRAINING PROGRAM

## 2022-09-29 PROCEDURE — 1159F MED LIST DOCD IN RCRD: CPT | Mod: CPTII,,, | Performed by: STUDENT IN AN ORGANIZED HEALTH CARE EDUCATION/TRAINING PROGRAM

## 2022-09-29 PROCEDURE — 99214 OFFICE O/P EST MOD 30 MIN: CPT | Mod: S$PBB,,, | Performed by: STUDENT IN AN ORGANIZED HEALTH CARE EDUCATION/TRAINING PROGRAM

## 2022-09-29 RX ORDER — METHOCARBAMOL 500 MG/1
TABLET, FILM COATED ORAL
COMMUNITY
Start: 2022-09-24 | End: 2022-09-29

## 2022-09-29 RX ORDER — LACTULOSE 10 G/15ML
10 SOLUTION ORAL; RECTAL 3 TIMES DAILY
Qty: 135 ML | Refills: 1 | Status: SHIPPED | OUTPATIENT
Start: 2022-09-29 | End: 2022-10-02

## 2022-09-29 RX ORDER — AMOXICILLIN 250 MG
1 CAPSULE ORAL DAILY
Qty: 30 TABLET | Refills: 0 | Status: SHIPPED | OUTPATIENT
Start: 2022-09-29 | End: 2022-10-29

## 2022-09-29 RX ORDER — METHOCARBAMOL 750 MG/1
750 TABLET, FILM COATED ORAL 4 TIMES DAILY
Qty: 20 TABLET | Refills: 0 | Status: SHIPPED | OUTPATIENT
Start: 2022-09-29 | End: 2022-10-04

## 2022-09-29 NOTE — PROGRESS NOTES
Bladder Infection, Male (Adult)    You have a bladder infection.  Bacteria don’t usually stay in the urine. But when they do, the urine can become infected. This is called a urinary tract infection (UTI). An infection can occur anywhere in the urinary tract. It could be in a kidney or in the bladder and urethra. The urethra is the tube that drains the urine from the bladder through the tip of the penis.  The most common place for a UTI is in the bladder. This is called a bladder infection. Most bladder infections are easily treated. They are not serious unless the infection spreads up to the kidney.  The terms bladder infection, UTI, and cystitis are often used to describe the same thing, but they aren’t always the same. Cystitis is an inflammation of the bladder. The most common cause of cystitis is an infection.   Keep in mind:  · Infections in the urine are called UTIs  · Cystitis is usually caused by a UTI  · Not all UTIs and cases of cystitis are bladder infections  · Bladder infections are the most common type of cystitis  Bladder infections are treated with antibiotics. They usually clear up quickly without complications. Treatment helps prevent a more serious kidney infection.  Symptoms of a bladder infection  The infection causes inflammation in the urethra and bladder. This inflammation causes many of the symptoms. The most common symptoms of a bladder infection are:  · Pain or burning when urinating  · Having to go more often than usual  · Feeling like you need to go right away  · Only a small amount comes out  · Blood in urine  · Discomfort in your belly (abdomen). This is usually in the lower abdomen, above the pubic bone.  · Cloudy, strong, or bad smelling urine  · Unable to urinate (retention)  · Urinary incontinence  · Fever  · Loss of appetite  Older adults may also feel confused.  Causes of a bladder infection  Bladder infections are not contagious. You can't get one from someone else, from a  "[facility] Note    Chief Complaint      Chief Complaint   Patient presents with    Follow-up     ED follow up-- patient went to [facility] Saturday for right side pain.        History of Present Illness      Rachel Zazueta is a 42 y.o. female who presents today for Follow-up (ED follow up-- patient went to [facility] Saturday for right side pain. )     After visiting the ED with initial work up and  Patient is still experiencing a lot of pain on her right side.     Patient had had couples falls from uneven erik falling backwards over her floor and another time tripping over her dog crossing her legs and falling on the sofa hitting her head. She attributes "being clumsy."  No passing out, dizziness, excessive headaches, vision change prior to fall occurrences.      Past Medical History:  Past Medical History:   Diagnosis Date    Anemia     Anxiety     Depressed     Diskitis     Hep C w/o coma, chronic     IV drug abuse     Kidney stone     Liver cirrhosis      Past Surgical History:   has a past surgical history that includes Cholecystectomy; benine tumor removal; Back surgery; Kidney surgery; Removal of hardware from spine (N/A, 2/21/2022); Lumbar fusion (Bilateral, 3/2/2022); and Spine surgery.    Family History:  family history includes Cirrhosis in her father; Drug abuse in her father and mother; Hepatitis in her father and mother; Liver cancer in her mother.     Social History:  Social History     Tobacco Use    Smoking status: Some Days     Packs/day: 0.50     Years: 23.00     Pack years: 11.50     Types: Cigarettes    Smokeless tobacco: Never    Tobacco comments:     patient states she knows she nees to quit.   Substance Use Topics    Alcohol use: Not Currently     Comment: quit 2014    Drug use: Yes     Frequency: 4.0 times per week     Types: Marijuana     Comment: Patient denies needle use, only inhalation of methampehtamines or orally.  Last known drug use was prior " to admission to hospital stay for surgery     I personally reviewed all past medical, surgical, social and family history.  Review of Systems   Constitutional:  Negative for chills, fever, malaise/fatigue and weight loss.   HENT:  Negative for congestion, ear discharge, ear pain, sinus pain and sore throat.    Eyes:  Negative for blurred vision, pain, discharge and redness.   Respiratory:  Negative for cough, hemoptysis, sputum production, shortness of breath, wheezing and stridor.    Cardiovascular:  Negative for chest pain, palpitations and leg swelling.   Gastrointestinal:  Negative for abdominal pain, blood in stool, constipation, diarrhea, nausea and vomiting.   Genitourinary:  Negative for dysuria, frequency and hematuria.   Musculoskeletal:  Positive for back pain (constant) and joint pain (knees bilaterally). Negative for falls, myalgias and neck pain.   Skin: Negative.  Negative for rash.   Neurological:  Negative for dizziness, tingling, focal weakness, seizures, weakness and headaches.   Endo/Heme/Allergies:  Negative for environmental allergies and polydipsia. Does not bruise/bleed easily.   Psychiatric/Behavioral:  Negative for depression and suicidal ideas. The patient is not nervous/anxious.       Medications:  Outpatient Encounter Medications as of 2022   Medication Sig Dispense Refill    amoxicillin (AMOXIL) 500 MG capsule Take 2 capsules (1,000 mg total) by mouth every 12 (twelve) hours. 120 capsule 2    pregabalin (LYRICA) 75 MG capsule Take 1 capsule (75 mg total) by mouth 3 (three) times daily. 90 capsule 0    [DISCONTINUED] methocarbamoL (ROBAXIN) 500 MG Tab Take by mouth.      acetaminophen (TYLENOL) 325 MG tablet Take 2 tablets (650 mg total) by mouth every 6 (six) hours as needed for Temperature greater than (100.4 F).  0    ferrous sulfate 325 (65 FE) MG EC tablet Take 325 mg by mouth once daily.      [] lactulose (CHRONULAC) 10 gram/15 mL solution Take 15 mLs (10 g total) by  toilet seat, or from sharing a bath.  The most common cause of bladder infections is bacteria from the bowels. The bacteria get onto the skin around the opening of the urethra. From there they can get into the urine and travel up to the bladder. This causes inflammation and an infection. This usually happens because of:  · An enlarged prostate  · Poor cleaning of the genitals  · Procedures that put a tube in your bladder, like a Bear catheter  · Bowel incontinence  · Older age  · Not emptying your bladder. The urine stays there, giving the bacteria a chance to grow.  · Dehydration. This allows urine to stay in the bladder longer.  · Constipation. This can cause the bowels to push on the bladder or urethra and keep the  bladder from emptying.  Home care  Follow these guidelines when caring for yourself at home:  · You have been prescribed antibiotics. Take this medicine until you have finished it, even if you feel better. Taking all of the medicine will make sure the infection has cleared.  · You can use acetaminophen or ibuprofen for pain, fever, or discomfort, unless another medicine was prescribed. You can also alternate them, or use both together. They work differently and are a different class of medicines, so taking them together is not an overdose. If you have chronic liver or kidney disease, talk with your health care provider before using these medicines. Also talk with your provider if you’ve had a stomach ulcer or GI bleeding or are taking blood thinner medications.  · You may have been given phenazopyridine to ease burning when you urinate. It will cause your urine to be bright orange. It can stain clothing.  General care:  · Drink plenty of fluids, unless your health care provider told you not to. Fluids will prevent dehydration and flush out your bladder.  · Use good personal hygiene. Wipe from front to back after using the toilet, and clean your penis regularly. If you aren’t circumcised, retract the  "mouth 3 (three) times daily. for 3 days 135 mL 1    [] methocarbamoL (ROBAXIN) 750 MG Tab Take 1 tablet (750 mg total) by mouth 4 (four) times daily. for 5 days 20 tablet 0    pantoprazole (PROTONIX) 40 MG tablet Take 1 tablet (40 mg total) by mouth once daily. 30 tablet 1    polyethylene glycol (GLYCOLAX) 17 gram/dose powder Take 17 g by mouth once daily. 510 g 1    senna-docusate 8.6-50 mg (PERICOLACE) 8.6-50 mg per tablet Take 1 tablet by mouth once daily. 30 tablet 0    [DISCONTINUED] diclofenac sodium (VOLTAREN) 1 % Gel Apply 2 g topically 4 (four) times daily. 50 g 0     No facility-administered encounter medications on file as of 2022.     Allergies:  Review of patient's allergies indicates:   Allergen Reactions    Bee venom protein (honey bee) Anaphylaxis     Patient reports she is allergic to bee stings.    Wasp sting [allergen ext-venom-honey bee] Anaphylaxis    Adhesive Blisters    Iodine and iodide containing products Hives     Allergic to iodine in seafood only    Naproxen Other (See Comments)     Throat closing    Shellfish containing products     Nuts [tree nut] Rash     Health Maintenance:  Immunization History   Administered Date(s) Administered    COVID-19, MRNA, LN-S, PF (Pfizer) (Purple Cap) 2021    Influenza - Quadrivalent - PF *Preferred* (6 months and older) 10/05/2018, 2022, 2022    Pneumococcal Conjugate - 13 Valent 10/05/2018    Tdap 2018      Health Maintenance   Topic Date Due    Mammogram  Never done    TETANUS VACCINE  2028    Hepatitis C Screening  Completed    Lipid Panel  Completed        Physical Exam      Vital Signs  Temp: 98.2 °F (36.8 °C)  Temp src: Oral  Pulse: 98  Resp: 14  SpO2: 99 %  BP: 139/78  BP Location: Right arm  Patient Position: Sitting  Pain Score:   8  Pain Loc: Generalized  Height and Weight  Height: 5' 7" (170.2 cm)  Weight: 122.5 kg (270 lb)  BSA (Calculated - sq m): 2.41 sq meters  BMI (Calculated): 42.3  Weight in (lb) " foreskin when cleaning.  · Urinate more frequently, and don’t try to hold it in for long periods of time, if possible.  · Wear loose-fitting clothes and cotton underwear. Avoid tight-fitting pants. This helps keep you clean and dry.  · Change your diet to prevent constipation. This means eating more fresh foods and more fiber, and less junk and fatty foods.  · Avoid sex until your symptoms are gone.  · Avoid caffeine, alcohol, and spicy foods. These can irritate the bladder.  Follow-up care  Follow up with your health care provider, or as advised if all symptoms have not cleared up within 5 days. It is important to keep your follow-up appointment. You can talk with your doctor to see if you need more tests of the urinary tract. This is especially important if you have infections that keep coming back.  If a culture was done, you will be told if your treatment needs to be changed. If directed, you can call to find out the results.  If X-rays were taken, you will be told if the results will affect your treatment.  Call 911  Call 911 if any of these occur:  · Trouble breathing  · Difficulty waking up  · Feeling confused  · Fainting or loss of consciousness  · Rapid heart rate  When to seek medical advice  Call your health care provider right away if any of these occur:  · Fever of 100.4ºF (38ºC) or higher, or as directed by your health care provider  · Your symptoms don’t get better after 2 days of treatment  · Back or abdominal pain that gets worse  · Repeated vomiting, or you aren’t able to keep medicine down  · Weakness or dizziness  © 5354-8733 [employer]. [address] [place] [address], [place] [address]. All rights reserved. This information is not intended as a substitute for professional medical care. Always follow your healthcare professional's instructions.         to have BMI = 25: 159.3]    Physical Exam  Vitals reviewed.   Constitutional:       General: She is not in acute distress.     Appearance: Normal appearance. She is not ill-appearing or toxic-appearing.   HENT:      Head: Normocephalic and atraumatic.      Right Ear: External ear normal.      Left Ear: External ear normal.      Nose: Nose normal.      Mouth/Throat:      Mouth: Mucous membranes are dry.      Pharynx: Oropharynx is clear.   Eyes:      Extraocular Movements: Extraocular movements intact.      Conjunctiva/sclera: Conjunctivae normal.   Cardiovascular:      Rate and Rhythm: Normal rate and regular rhythm.      Pulses: Normal pulses.      Heart sounds: Normal heart sounds.   Pulmonary:      Effort: Pulmonary effort is normal. No respiratory distress.      Breath sounds: No stridor. No wheezing, rhonchi or rales.   Abdominal:      General: Abdomen is flat. Bowel sounds are normal. There is no distension.      Palpations: Abdomen is soft. There is no mass.      Tenderness: There is no abdominal tenderness. There is no right CVA tenderness or left CVA tenderness.   Musculoskeletal:         General: No swelling, tenderness or deformity.      Cervical back: Normal range of motion and neck supple. No rigidity or tenderness.      Right lower leg: No edema.      Left lower leg: No edema.      Comments: Well healed midline scar over lumbar region, no erythema, no edema   Skin:     General: Skin is warm and dry.      Capillary Refill: Capillary refill takes less than 2 seconds.      Comments: Legs bilaterally with dry skin, chronic hyperpigmented skin color changes. No ulceration, no bleeding    Neurological:      General: No focal deficit present.      Mental Status: She is alert and oriented to person, place, and time. Mental status is at baseline.      Motor: No weakness.      Gait: Gait abnormal (requires walking cane for support).   Psychiatric:         Mood and Affect: Mood normal.         Behavior: Behavior  normal.         Thought Content: Thought content normal.         Judgment: Judgment normal.      Laboratory:  CBC:  Recent Labs   Lab 05/03/22  1143 07/01/22  1542 07/23/22  1048   WBC 3.83 L 3.45 L 5.79   RBC 3.61 L 3.79 L 4.33   Hemoglobin 9.6 L 10.5 L 11.9 L   Hematocrit 32.0 L 32.5 L 36.6 L   Platelets 95 L 82 L 121 L   MCV 89 86 85   MCH 26.6 L 27.7 27.5   MCHC 30.0 L 32.3 32.5     CMP:  Recent Labs   Lab 05/03/22  1143 07/01/22  1542 07/23/22  1048   Glucose 77 82 91   Calcium 8.4 L 8.5 L 8.3 L   Albumin 2.8 L 2.9 L 3.0 L   Total Protein 7.6 7.7 8.8 H   Sodium 136 138 135 L   Potassium 4.4 3.5 3.8   CO2 29 29 30 H   Chloride 102 106 103   BUN 14 12 16   Alkaline Phosphatase 158 H 165 H 177 H   ALT 19 29 31   AST 49 H 59 H 65 H   Total Bilirubin 1.7 H 2.2 H 2.3 H     URINALYSIS:  Recent Labs   Lab 07/27/21  0600 04/09/22  1041 04/22/22  1018   Color, UA Yellow Yellow Yellow   Specific Gravity, UA <=1.005 A 1.025 1.020   pH, UA 8.0 6.0 6.0   Protein, UA Negative Negative Negative   Bacteria Negative Negative Many A   Nitrite, UA Negative Negative Negative   Leukocytes, UA 1+ A Trace A 1+ A   Urobilinogen, UA 1.0 2.0-3.0 A 1.0   Hyaline Casts, UA 2 A 0 8.2 A      LIPIDS:  Recent Labs   Lab 04/16/21  1738 02/07/22  1647   TSH 1.255  --    HDL  --  27 L   Cholesterol  --  68 L   Triglycerides  --  50   LDL Cholesterol  --  31.0 L   HDL/Cholesterol Ratio  --  39.7   Non-HDL Cholesterol  --  41   Total Cholesterol/HDL Ratio  --  2.5     TSH:  Recent Labs   Lab 04/16/21  1738   TSH 1.255     A1C:  Recent Labs   Lab 04/16/21  1738   Hemoglobin A1C 4.6       Assessment/Plan     Rachel Zazueta is a 42 y.o.female with:    Problem List Items Addressed This Visit          Psychiatric    Substance use disorder    Relevant Orders    Ambulatory referral/consult to Psychiatry       GI    Constipation    Relevant Medications    senna-docusate 8.6-50 mg (PERICOLACE) 8.6-50 mg per tablet     Other Visit Diagnoses        Depression, unspecified depression type    -  Primary    Relevant Orders    Ambulatory referral/consult to Psychiatry    Need for influenza vaccination        Relevant Orders    Influenza - Quadrivalent *Preferred* (6 months+) (PF) (Completed)    Muscle strain of right upper back, subsequent encounter              Other than changes above, cont current medications and maintain follow up with specialists.  Follow up in about 1 week (around 10/6/2022) for constipation.    No future appointments.    Kazumi G Yoshinaga, DO [facility]

## 2022-10-04 ENCOUNTER — PATIENT MESSAGE (OUTPATIENT)
Dept: ADMINISTRATIVE | Facility: HOSPITAL | Age: 42
End: 2022-10-04
Payer: MEDICAID

## 2022-11-14 ENCOUNTER — HOSPITAL ENCOUNTER (EMERGENCY)
Facility: HOSPITAL | Age: 42
Discharge: ANOTHER HOSPITAL | End: 2022-11-14
Attending: STUDENT IN AN ORGANIZED HEALTH CARE EDUCATION/TRAINING PROGRAM
Payer: MEDICAID

## 2022-11-14 VITALS
BODY MASS INDEX: 41.59 KG/M2 | HEIGHT: 67 IN | DIASTOLIC BLOOD PRESSURE: 83 MMHG | RESPIRATION RATE: 18 BRPM | SYSTOLIC BLOOD PRESSURE: 133 MMHG | TEMPERATURE: 98 F | OXYGEN SATURATION: 97 % | WEIGHT: 265 LBS | HEART RATE: 88 BPM

## 2022-11-14 DIAGNOSIS — M25.512 ACUTE PAIN OF LEFT SHOULDER: Primary | ICD-10-CM

## 2022-11-14 DIAGNOSIS — D61.818 PANCYTOPENIA: ICD-10-CM

## 2022-11-14 DIAGNOSIS — R79.82 ELEVATED C-REACTIVE PROTEIN (CRP): ICD-10-CM

## 2022-11-14 PROBLEM — R82.90 CLOUDY URINE: Status: RESOLVED | Noted: 2022-04-22 | Resolved: 2022-11-14

## 2022-11-14 PROBLEM — M00.9 PYOGENIC ARTHRITIS OF LEFT SHOULDER REGION: Status: ACTIVE | Noted: 2022-11-14

## 2022-11-14 PROBLEM — Z12.39 ENCOUNTER FOR SCREENING FOR MALIGNANT NEOPLASM OF BREAST: Status: RESOLVED | Noted: 2022-01-27 | Resolved: 2022-11-14

## 2022-11-14 PROBLEM — N89.8 VAGINAL ITCHING: Status: RESOLVED | Noted: 2022-03-05 | Resolved: 2022-11-14

## 2022-11-14 PROBLEM — K59.00 CONSTIPATION: Status: RESOLVED | Noted: 2021-04-14 | Resolved: 2022-11-14

## 2022-11-14 PROBLEM — M79.89 LEG SWELLING: Status: RESOLVED | Noted: 2022-04-22 | Resolved: 2022-11-14

## 2022-11-14 PROBLEM — Z23 INFLUENZA VACCINE ADMINISTERED: Status: RESOLVED | Noted: 2022-01-26 | Resolved: 2022-11-14

## 2022-11-14 PROBLEM — Z01.818 PRE-OP EVALUATION: Status: RESOLVED | Noted: 2021-04-14 | Resolved: 2022-11-14

## 2022-11-14 PROBLEM — R78.81 GRAM-POSITIVE COCCI BACTEREMIA: Status: RESOLVED | Noted: 2021-04-15 | Resolved: 2022-11-14

## 2022-11-14 PROBLEM — G06.2 EPIDURAL ABSCESS: Status: RESOLVED | Noted: 2021-04-27 | Resolved: 2022-11-14

## 2022-11-14 PROBLEM — D64.9 SEVERE ANEMIA: Status: RESOLVED | Noted: 2017-03-14 | Resolved: 2022-11-14

## 2022-11-14 PROBLEM — E80.6 HYPERBILIRUBINEMIA: Status: RESOLVED | Noted: 2022-02-15 | Resolved: 2022-11-14

## 2022-11-14 LAB
ALBUMIN SERPL BCP-MCNC: 2.1 G/DL (ref 3.5–5.2)
ALP SERPL-CCNC: 126 U/L (ref 55–135)
ALT SERPL W/O P-5'-P-CCNC: 14 U/L (ref 10–44)
ANION GAP SERPL CALC-SCNC: 0 MMOL/L (ref 8–16)
AST SERPL-CCNC: 30 U/L (ref 10–40)
BASOPHILS # BLD AUTO: 0.01 K/UL (ref 0–0.2)
BASOPHILS NFR BLD: 0.3 % (ref 0–1.9)
BILIRUB SERPL-MCNC: 1.2 MG/DL (ref 0.1–1)
BUN SERPL-MCNC: 12 MG/DL (ref 6–20)
CALCIUM SERPL-MCNC: 7.7 MG/DL (ref 8.7–10.5)
CHLORIDE SERPL-SCNC: 109 MMOL/L (ref 95–110)
CO2 SERPL-SCNC: 27 MMOL/L (ref 23–29)
CREAT SERPL-MCNC: 0.6 MG/DL (ref 0.5–1.4)
CRP SERPL-MCNC: 11.1 MG/DL (ref 0–0.75)
DIFFERENTIAL METHOD: ABNORMAL
EOSINOPHIL # BLD AUTO: 0.1 K/UL (ref 0–0.5)
EOSINOPHIL NFR BLD: 1.8 % (ref 0–8)
ERYTHROCYTE [DISTWIDTH] IN BLOOD BY AUTOMATED COUNT: 17.5 % (ref 11.5–14.5)
ERYTHROCYTE [SEDIMENTATION RATE] IN BLOOD: 67 MM/HR (ref 0–20)
EST. GFR  (NO RACE VARIABLE): >60 ML/MIN/1.73 M^2
GLUCOSE SERPL-MCNC: 144 MG/DL (ref 70–110)
HCT VFR BLD AUTO: 29.2 % (ref 37–48.5)
HGB BLD-MCNC: 9.2 G/DL (ref 12–16)
IMM GRANULOCYTES # BLD AUTO: 0.02 K/UL (ref 0–0.04)
IMM GRANULOCYTES NFR BLD AUTO: 0.5 % (ref 0–0.5)
LACTATE SERPL-SCNC: 1.4 MMOL/L (ref 0.5–2.2)
LYMPHOCYTES # BLD AUTO: 0.4 K/UL (ref 1–4.8)
LYMPHOCYTES NFR BLD: 10 % (ref 18–48)
MCH RBC QN AUTO: 28 PG (ref 27–31)
MCHC RBC AUTO-ENTMCNC: 31.5 G/DL (ref 32–36)
MCV RBC AUTO: 89 FL (ref 82–98)
MONOCYTES # BLD AUTO: 0.2 K/UL (ref 0.3–1)
MONOCYTES NFR BLD: 6.3 % (ref 4–15)
NEUTROPHILS # BLD AUTO: 3.1 K/UL (ref 1.8–7.7)
NEUTROPHILS NFR BLD: 81.1 % (ref 38–73)
NRBC BLD-RTO: 0 /100 WBC
PLATELET # BLD AUTO: 77 K/UL (ref 150–450)
PMV BLD AUTO: 10.1 FL (ref 9.2–12.9)
POTASSIUM SERPL-SCNC: 3.5 MMOL/L (ref 3.5–5.1)
PROT SERPL-MCNC: 8.1 G/DL (ref 6–8.4)
RBC # BLD AUTO: 3.28 M/UL (ref 4–5.4)
SODIUM SERPL-SCNC: 136 MMOL/L (ref 136–145)
WBC # BLD AUTO: 3.79 K/UL (ref 3.9–12.7)

## 2022-11-14 PROCEDURE — 63600175 PHARM REV CODE 636 W HCPCS: Performed by: STUDENT IN AN ORGANIZED HEALTH CARE EDUCATION/TRAINING PROGRAM

## 2022-11-14 PROCEDURE — 85025 COMPLETE CBC W/AUTO DIFF WBC: CPT | Performed by: STUDENT IN AN ORGANIZED HEALTH CARE EDUCATION/TRAINING PROGRAM

## 2022-11-14 PROCEDURE — 86140 C-REACTIVE PROTEIN: CPT | Performed by: STUDENT IN AN ORGANIZED HEALTH CARE EDUCATION/TRAINING PROGRAM

## 2022-11-14 PROCEDURE — 80053 COMPREHEN METABOLIC PANEL: CPT | Performed by: STUDENT IN AN ORGANIZED HEALTH CARE EDUCATION/TRAINING PROGRAM

## 2022-11-14 PROCEDURE — 99285 EMERGENCY DEPT VISIT HI MDM: CPT | Mod: 25

## 2022-11-14 PROCEDURE — 83605 ASSAY OF LACTIC ACID: CPT | Performed by: STUDENT IN AN ORGANIZED HEALTH CARE EDUCATION/TRAINING PROGRAM

## 2022-11-14 PROCEDURE — 96372 THER/PROPH/DIAG INJ SC/IM: CPT | Performed by: STUDENT IN AN ORGANIZED HEALTH CARE EDUCATION/TRAINING PROGRAM

## 2022-11-14 PROCEDURE — 85651 RBC SED RATE NONAUTOMATED: CPT | Performed by: STUDENT IN AN ORGANIZED HEALTH CARE EDUCATION/TRAINING PROGRAM

## 2022-11-14 PROCEDURE — 25000003 PHARM REV CODE 250: Performed by: STUDENT IN AN ORGANIZED HEALTH CARE EDUCATION/TRAINING PROGRAM

## 2022-11-14 PROCEDURE — 36415 COLL VENOUS BLD VENIPUNCTURE: CPT | Performed by: STUDENT IN AN ORGANIZED HEALTH CARE EDUCATION/TRAINING PROGRAM

## 2022-11-14 PROCEDURE — 87040 BLOOD CULTURE FOR BACTERIA: CPT | Mod: 59 | Performed by: STUDENT IN AN ORGANIZED HEALTH CARE EDUCATION/TRAINING PROGRAM

## 2022-11-14 PROCEDURE — 20610 DRAIN/INJ JOINT/BURSA W/O US: CPT

## 2022-11-14 RX ORDER — CYCLOBENZAPRINE HCL 10 MG
10 TABLET ORAL
Status: COMPLETED | OUTPATIENT
Start: 2022-11-14 | End: 2022-11-14

## 2022-11-14 RX ORDER — LIDOCAINE HYDROCHLORIDE 10 MG/ML
10 INJECTION, SOLUTION EPIDURAL; INFILTRATION; INTRACAUDAL; PERINEURAL
Status: COMPLETED | OUTPATIENT
Start: 2022-11-14 | End: 2022-11-14

## 2022-11-14 RX ORDER — OXYCODONE AND ACETAMINOPHEN 5; 325 MG/1; MG/1
1 TABLET ORAL
Status: COMPLETED | OUTPATIENT
Start: 2022-11-14 | End: 2022-11-14

## 2022-11-14 RX ORDER — ONDANSETRON 4 MG/1
4 TABLET, ORALLY DISINTEGRATING ORAL
Status: COMPLETED | OUTPATIENT
Start: 2022-11-14 | End: 2022-11-14

## 2022-11-14 RX ORDER — VANCOMYCIN 1.75 GRAM/500 ML IN 0.9 % SODIUM CHLORIDE INTRAVENOUS
1750
Status: COMPLETED | OUTPATIENT
Start: 2022-11-14 | End: 2022-11-14

## 2022-11-14 RX ORDER — HYDROMORPHONE HYDROCHLORIDE 1 MG/ML
0.5 INJECTION, SOLUTION INTRAMUSCULAR; INTRAVENOUS; SUBCUTANEOUS
Status: COMPLETED | OUTPATIENT
Start: 2022-11-14 | End: 2022-11-14

## 2022-11-14 RX ORDER — DEXAMETHASONE SODIUM PHOSPHATE 4 MG/ML
8 INJECTION, SOLUTION INTRA-ARTICULAR; INTRALESIONAL; INTRAMUSCULAR; INTRAVENOUS; SOFT TISSUE
Status: COMPLETED | OUTPATIENT
Start: 2022-11-14 | End: 2022-11-14

## 2022-11-14 RX ORDER — DEXAMETHASONE SODIUM PHOSPHATE 4 MG/ML
8 INJECTION, SOLUTION INTRA-ARTICULAR; INTRALESIONAL; INTRAMUSCULAR; INTRAVENOUS; SOFT TISSUE
Status: DISCONTINUED | OUTPATIENT
Start: 2022-11-14 | End: 2022-11-14

## 2022-11-14 RX ADMIN — DEXAMETHASONE SODIUM PHOSPHATE 8 MG: 4 INJECTION, SOLUTION INTRA-ARTICULAR; INTRALESIONAL; INTRAMUSCULAR; INTRAVENOUS; SOFT TISSUE at 02:11

## 2022-11-14 RX ADMIN — HYDROMORPHONE HYDROCHLORIDE 0.5 MG: 0.5 INJECTION, SOLUTION INTRAMUSCULAR; INTRAVENOUS; SUBCUTANEOUS at 04:11

## 2022-11-14 RX ADMIN — CYCLOBENZAPRINE HYDROCHLORIDE 10 MG: 10 TABLET, FILM COATED ORAL at 02:11

## 2022-11-14 RX ADMIN — CEFTRIAXONE 2 G: 2 INJECTION, SOLUTION INTRAVENOUS at 06:11

## 2022-11-14 RX ADMIN — ONDANSETRON 4 MG: 4 TABLET, ORALLY DISINTEGRATING ORAL at 04:11

## 2022-11-14 RX ADMIN — Medication 1750 MG: at 06:11

## 2022-11-14 RX ADMIN — OXYCODONE HYDROCHLORIDE AND ACETAMINOPHEN 1 TABLET: 5; 325 TABLET ORAL at 02:11

## 2022-11-14 RX ADMIN — LIDOCAINE HYDROCHLORIDE 100 MG: 10 INJECTION, SOLUTION EPIDURAL; INFILTRATION; INTRACAUDAL; PERINEURAL at 04:11

## 2022-11-14 NOTE — ED PROVIDER NOTES
Encounter Date: 11/14/2022       History     Chief Complaint   Patient presents with    Shoulder Pain     Patient reports she woke up at 7pm on Saturday night with shoulder progressively worsening left shoulder pain. Denies injury.      41 year old female with PMH of hepatitis C, liver cirrhosis, pancytopenia, polysubstance abuse, and spinal infection with GBS bacteremia, s/p hardware removal on 3/2 with placement of titanium cage in disc space presents with left shoulder pain that has been going on since Saturday.  Patient said shoulder pain is constant achy worsened with certain movements.  Patient denies fever, trauma, vomiting, recent IV drug    Review of patient's allergies indicates:   Allergen Reactions    Bee venom protein (honey bee) Anaphylaxis     Patient reports she is allergic to bee stings.    Wasp sting [allergen ext-venom-honey bee] Anaphylaxis    Adhesive Blisters    Iodine and iodide containing products Hives     Allergic to iodine in seafood only    Naproxen Other (See Comments)     Throat closing    Shellfish containing products     Nuts [tree nut] Rash     Past Medical History:   Diagnosis Date    Anemia     Anxiety     Depressed     Diskitis     Hep C w/o coma, chronic     IV drug abuse     Kidney stone     Liver cirrhosis      Past Surgical History:   Procedure Laterality Date    ARTHROTOMY OF SHOULDER Left 11/15/2022    Procedure: ARTHROTOMY, SHOULDER;  Surgeon: Bjorn Hayes MD;  Location: [facility];  Service: Orthopedics;  Laterality: Left;  Left acromioclavicular joint arthrotomy    BACK SURGERY      benine tumor removal      forehead, age 9    CHOLECYSTECTOMY      KIDNEY SURGERY      LUMBAR FUSION Bilateral 3/2/2022    Procedure: FUSION, SPINE, LUMBAR;  Surgeon: Guille Templeton MD;  Location: [address];  Service: Neurosurgery;  Laterality: Bilateral;  AIRO  T10--Pelvis    REMOVAL OF HARDWARE FROM SPINE N/A 2/21/2022    Procedure: REMOVAL, HARDWARE, SPINE;  Surgeon: Guille Templeton MD;   Location: [facility] OR [address] [place] [address];  Service: Neurosurgery;  Laterality: N/A;  [place]    SPINE SURGERY       Family History   Problem Relation Age of Onset    Hepatitis Mother     Drug abuse Mother     Liver cancer Mother     Drug abuse Father     Cirrhosis Father     Hepatitis Father      Social History     Tobacco Use    Smoking status: Some Days     Packs/day: 0.50     Years: 23.00     Pack years: 11.50     Types: Cigarettes    Smokeless tobacco: Never    Tobacco comments:     patient states she knows she nees to quit.   Substance Use Topics    Alcohol use: Not Currently     Comment: quit 2014    Drug use: Yes     Frequency: 4.0 times per week     Types: Marijuana     Comment: Patient denies needle use, only inhalation of methampehtamines or orally.  Last known drug use was prior to admission to hospital stay for surgery     Review of Systems   Constitutional: Negative.    HENT: Negative.     Respiratory: Negative.     Cardiovascular: Negative.    Gastrointestinal: Negative.    Genitourinary: Negative.    Musculoskeletal:  Positive for arthralgias.   Skin: Negative.    Neurological: Negative.    Psychiatric/Behavioral: Negative.     All other systems reviewed and are negative.    Physical Exam     Initial Vitals [11/14/22 0222]   BP Pulse Resp Temp SpO2   133/89 (!) 123 18 98.1 °F (36.7 °C) 100 %      MAP       --         Physical Exam    Nursing note and vitals reviewed.  Constitutional: Vital signs are normal. She appears well-developed and well-nourished.   HENT:   Head: Normocephalic and atraumatic.   Eyes: Conjunctivae and lids are normal.   Neck: Trachea normal. Neck supple.   Cardiovascular:  Normal rate, regular rhythm, normal heart sounds, intact distal pulses and normal pulses.           No murmur heard.  Pulmonary/Chest: Breath sounds normal. No respiratory distress.   Abdominal: Abdomen is soft. Bowel sounds are normal.   Musculoskeletal:         General: Tenderness present. Normal range of motion.       Cervical back: Neck supple.      Comments: Decreased range of motion of left shoulder      Neurological: She is alert and oriented to person, place, and time. She has normal strength. No cranial nerve deficit or sensory deficit.   Skin: Skin is warm. Capillary refill takes less than 2 seconds.   Psychiatric: She has a normal mood and affect. Her speech is normal. Thought content normal.       ED Course   Arthrocentesis    Date/Time: 11/14/2022 4:49 AM  Location procedure was performed: [facility] EMERGENCY DEPARTMENT  Performed by: Koby Mar MD  Authorized by: Koby Mar MD   Assisting provider: Koby Mar MD  Pre-op diagnosis: shoulder pain  Post-op diagnosis: shoulder pain  Consent Done: Yes  Consent: Verbal consent obtained. Written consent obtained.  Risks and benefits: risks, benefits and alternatives were discussed  Consent given by: patient  Indications: joint swelling, pain and possible septic joint   Body area: shoulder  Joint: left shoulder  Local anesthesia used: yes    Anesthesia:  Local anesthesia used: yes  Local Anesthetic: lidocaine 1% without epinephrine    Patient sedated: no  Description of findings: direct   Preparation: Patient was prepped and draped in the usual sterile fashion.  Needle size: 18 G  Approach: anterior  Technical procedures used: direct  Significant surgical tasks conducted by the assistant(s): direct  Complications: No  Specimens: No  Implants: No      Labs Reviewed   CBC W/ AUTO DIFFERENTIAL - Abnormal; Notable for the following components:       Result Value    WBC 3.79 (*)     RBC 3.28 (*)     Hemoglobin 9.2 (*)     Hematocrit 29.2 (*)     MCHC 31.5 (*)     RDW 17.5 (*)     Platelets 77 (*)     Lymph # 0.4 (*)     Mono # 0.2 (*)     Gran % 81.1 (*)     Lymph % 10.0 (*)     All other components within normal limits   COMPREHENSIVE METABOLIC PANEL - Abnormal; Notable for the following components:    Glucose 144 (*)     Calcium 7.7 (*)     Albumin 2.1 (*)     Total  Bilirubin 1.2 (*)     Anion Gap 0 (*)     All other components within normal limits   C-REACTIVE PROTEIN - Abnormal; Notable for the following components:    CRP 11.10 (*)     All other components within normal limits   SEDIMENTATION RATE - Abnormal; Notable for the following components:    Sed Rate 67 (*)     All other components within normal limits   CULTURE, BLOOD   CULTURE, BLOOD   LACTIC ACID, PLASMA          Imaging Results              X-Ray Shoulder 2 or More Views Left (Final result)  Result time 11/14/22 08:19:25      Final result by Earline Chakraborty MD (11/14/22 08:19:25)                   Impression:      Distal clavicle/periarticular osseous erosions, new since 08/11/2019.    The finding is nonspecific and could be secondary to distal clavicle posttraumatic osteolysis, inflammatory or infectious arthropathy      Electronically signed by: Earline Chakraborty MD  Date:    11/14/2022  Time:    08:19               Narrative:    EXAMINATION:  XR SHOULDER COMPLETE 2 OR MORE VIEWS LEFT    CLINICAL HISTORY:  pain;    COMPARISON:  Left shoulder radiographs of 08/11/2019    FINDINGS:  Mild irregularity of periarticular distal clavicle, new since 08/11/2019.  No acute fracture or dislocation.  Unremarkable radiographic appearance of the glenohumeral joint.                                       Medications   cyclobenzaprine tablet 10 mg (10 mg Oral Given 11/14/22 0247)   dexAMETHasone injection 8 mg (8 mg Intramuscular Given 11/14/22 0247)   oxyCODONE-acetaminophen 5-325 mg per tablet 1 tablet (1 tablet Oral Given 11/14/22 0247)   LIDOcaine (PF) 10 mg/ml (1%) injection 100 mg (100 mg Infiltration Given 11/14/22 0419)   HYDROmorphone injection 0.5 mg (0.5 mg Intramuscular Given 11/14/22 0418)   ondansetron disintegrating tablet 4 mg (4 mg Oral Given 11/14/22 0417)   cefTRIAXone (ROCEPHIN) 2 g/50 mL D5W IVPB (0 g Intravenous Stopped 11/14/22 0645)   vancomycin 1750 mg in 0.9% sodium chloride 500 mL IVPB (0 mg  Intravenous Stopped 11/14/22 0832)     Medical Decision Making:   Initial Assessment:   41 year old female with PMH of hepatitis C, liver cirrhosis, pancytopenia, polysubstance abuse, and spinal infection with GBS bacteremia, s/p hardware removal on 3/2 with placement of titanium cage in disc space presents with left shoulder pain that has been going on since Saturday.  Mildly tachycardic but otherwise stable vitals afebrile.  Will get screening labs with inflammatory marker.  Treat pain.           ED Course as of 11/17/22 0444 Mon Nov 14, 2022   0520 Labs and imaging noted.  Arthrocentesis unsuccessful.  Patient will benefit from getting orthopedic evaluation as patient has limited range of motion of left shoulder actively and passively with increasing inflammatory [HD]      ED Course User Index  [HD] Koby Mar MD                 Clinical Impression:   Final diagnoses:  [M25.512] Acute pain of left shoulder (Primary)  [R79.82] Elevated C-reactive protein (CRP)  [D61.818] Pancytopenia      ED Disposition Condition    Transfer to Another Facility Stable                Koby Mar MD  11/17/22 0444

## 2022-11-14 NOTE — ED NOTES
Spoke with Felicitas, at [facility], pt is just trying to stay close to home that's why [facility] was mentioned in chart.

## 2022-11-14 NOTE — ED NOTES
IV antibiotics started a little late due to blood culture collection and difficulty obtaining IV access. Rocephin infusing first and vancomycin to infuse after.

## 2022-11-15 ENCOUNTER — PATIENT MESSAGE (OUTPATIENT)
Dept: INFECTIOUS DISEASES | Facility: CLINIC | Age: 42
End: 2022-11-15
Payer: MEDICAID

## 2022-11-18 ENCOUNTER — PATIENT MESSAGE (OUTPATIENT)
Dept: NEUROSURGERY | Facility: CLINIC | Age: 42
End: 2022-11-18
Payer: MEDICAID

## 2022-11-19 LAB
BACTERIA BLD CULT: NORMAL
BACTERIA BLD CULT: NORMAL

## 2022-11-21 ENCOUNTER — PATIENT MESSAGE (OUTPATIENT)
Dept: INFECTIOUS DISEASES | Facility: CLINIC | Age: 42
End: 2022-11-21
Payer: MEDICAID

## 2022-11-23 ENCOUNTER — PATIENT OUTREACH (OUTPATIENT)
Dept: ADMINISTRATIVE | Facility: CLINIC | Age: 42
End: 2022-11-23
Payer: MEDICAID

## 2022-11-23 ENCOUNTER — PATIENT MESSAGE (OUTPATIENT)
Dept: ADMINISTRATIVE | Facility: CLINIC | Age: 42
End: 2022-11-23
Payer: MEDICAID

## 2022-11-23 NOTE — PROGRESS NOTES
C3 nurse attempted to contact Rachel Zazueta for a TCC post hospital discharge follow up call. No answer. The patient has a scheduled [facility] appointment with Dannielle Merino DO on 11/28/2022 @ 1040.

## 2022-11-25 NOTE — PROGRESS NOTES
C3 nurse spoke with Rachel Zazueta for a TCC post hospital discharge follow up call. The patient has a scheduled [facility] appointment with Dannielle Merino DO on 11/28/2022 @ 1040.

## 2022-11-25 NOTE — PROGRESS NOTES
C3 nurse attempted to contact Rachel Zazueta for a TCC post hospital discharge follow up call. No answer. The patient has a scheduled [facility] appointment with Dannielle Merino DO on 11/28/2022 @ 1040.

## 2022-12-09 ENCOUNTER — PATIENT MESSAGE (OUTPATIENT)
Dept: INFECTIOUS DISEASES | Facility: CLINIC | Age: 42
End: 2022-12-09
Payer: MEDICAID

## 2022-12-09 DIAGNOSIS — M00.9 SEPTIC ARTHRITIS, DUE TO UNSPECIFIED ORGANISM, SEPTIC ARTHRITIS OF UNSPECIFIED LOCATION: Primary | ICD-10-CM

## 2022-12-09 RX ORDER — SULFAMETHOXAZOLE AND TRIMETHOPRIM 800; 160 MG/1; MG/1
2 TABLET ORAL 2 TIMES DAILY
Qty: 168 TABLET | Refills: 0 | Status: ON HOLD | OUTPATIENT
Start: 2022-12-09 | End: 2023-01-04 | Stop reason: HOSPADM

## 2022-12-10 ENCOUNTER — HOSPITAL ENCOUNTER (EMERGENCY)
Facility: HOSPITAL | Age: 42
Discharge: HOME | End: 2022-12-10
Attending: STUDENT IN AN ORGANIZED HEALTH CARE EDUCATION/TRAINING PROGRAM
Payer: MEDICAID

## 2022-12-10 VITALS
DIASTOLIC BLOOD PRESSURE: 60 MMHG | HEART RATE: 101 BPM | TEMPERATURE: 100 F | WEIGHT: 275 LBS | SYSTOLIC BLOOD PRESSURE: 125 MMHG | RESPIRATION RATE: 20 BRPM | OXYGEN SATURATION: 100 % | HEIGHT: 67 IN | BODY MASS INDEX: 43.16 KG/M2

## 2022-12-10 DIAGNOSIS — R06.02 SOB (SHORTNESS OF BREATH): ICD-10-CM

## 2022-12-10 LAB
ALBUMIN SERPL BCP-MCNC: 2.4 G/DL (ref 3.5–5.2)
ALP SERPL-CCNC: 136 U/L (ref 55–135)
ALT SERPL W/O P-5'-P-CCNC: 21 U/L (ref 10–44)
ANION GAP SERPL CALC-SCNC: 5 MMOL/L (ref 8–16)
AST SERPL-CCNC: 41 U/L (ref 10–40)
BASOPHILS # BLD AUTO: 0.04 K/UL (ref 0–0.2)
BASOPHILS NFR BLD: 0.5 % (ref 0–1.9)
BILIRUB SERPL-MCNC: 2.5 MG/DL (ref 0.1–1)
BUN SERPL-MCNC: 11 MG/DL (ref 6–20)
CALCIUM SERPL-MCNC: 8.1 MG/DL (ref 8.7–10.5)
CHLORIDE SERPL-SCNC: 101 MMOL/L (ref 95–110)
CO2 SERPL-SCNC: 25 MMOL/L (ref 23–29)
CREAT SERPL-MCNC: 0.7 MG/DL (ref 0.5–1.4)
CTP QC/QA: YES
CTP QC/QA: YES
DIFFERENTIAL METHOD: ABNORMAL
EOSINOPHIL # BLD AUTO: 0.1 K/UL (ref 0–0.5)
EOSINOPHIL NFR BLD: 0.8 % (ref 0–8)
ERYTHROCYTE [DISTWIDTH] IN BLOOD BY AUTOMATED COUNT: 15.9 % (ref 11.5–14.5)
EST. GFR  (NO RACE VARIABLE): >60 ML/MIN/1.73 M^2
GLUCOSE SERPL-MCNC: 100 MG/DL (ref 70–110)
HCT VFR BLD AUTO: 27.4 % (ref 37–48.5)
HGB BLD-MCNC: 9.1 G/DL (ref 12–16)
IMM GRANULOCYTES # BLD AUTO: 0.1 K/UL (ref 0–0.04)
IMM GRANULOCYTES NFR BLD AUTO: 1.3 % (ref 0–0.5)
LYMPHOCYTES # BLD AUTO: 0.9 K/UL (ref 1–4.8)
LYMPHOCYTES NFR BLD: 10.8 % (ref 18–48)
MCH RBC QN AUTO: 28.3 PG (ref 27–31)
MCHC RBC AUTO-ENTMCNC: 33.2 G/DL (ref 32–36)
MCV RBC AUTO: 85 FL (ref 82–98)
MONOCYTES # BLD AUTO: 0.9 K/UL (ref 0.3–1)
MONOCYTES NFR BLD: 10.8 % (ref 4–15)
NEUTROPHILS # BLD AUTO: 6 K/UL (ref 1.8–7.7)
NEUTROPHILS NFR BLD: 75.8 % (ref 38–73)
NRBC BLD-RTO: 0 /100 WBC
NT-PROBNP SERPL-MCNC: 406 PG/ML (ref 5–450)
PLATELET # BLD AUTO: 98 K/UL (ref 150–450)
PMV BLD AUTO: 9.5 FL (ref 9.2–12.9)
POC MOLECULAR INFLUENZA A AGN: NEGATIVE
POC MOLECULAR INFLUENZA B AGN: NEGATIVE
POTASSIUM SERPL-SCNC: 3.7 MMOL/L (ref 3.5–5.1)
PROT SERPL-MCNC: 7.9 G/DL (ref 6–8.4)
RBC # BLD AUTO: 3.22 M/UL (ref 4–5.4)
SARS-COV-2 RDRP RESP QL NAA+PROBE: NEGATIVE
SODIUM SERPL-SCNC: 131 MMOL/L (ref 136–145)
WBC # BLD AUTO: 7.96 K/UL (ref 3.9–12.7)

## 2022-12-10 PROCEDURE — 99284 EMERGENCY DEPT VISIT MOD MDM: CPT | Mod: 25

## 2022-12-10 PROCEDURE — 99900035 HC TECH TIME PER 15 MIN (STAT)

## 2022-12-10 PROCEDURE — 36415 COLL VENOUS BLD VENIPUNCTURE: CPT | Performed by: CLINICAL NURSE SPECIALIST

## 2022-12-10 PROCEDURE — 87502 INFLUENZA DNA AMP PROBE: CPT

## 2022-12-10 PROCEDURE — 83880 ASSAY OF NATRIURETIC PEPTIDE: CPT | Performed by: CLINICAL NURSE SPECIALIST

## 2022-12-10 PROCEDURE — 99900031 HC PATIENT EDUCATION (STAT)

## 2022-12-10 PROCEDURE — 85025 COMPLETE CBC W/AUTO DIFF WBC: CPT | Performed by: CLINICAL NURSE SPECIALIST

## 2022-12-10 PROCEDURE — 25000242 PHARM REV CODE 250 ALT 637 W/ HCPCS: Performed by: CLINICAL NURSE SPECIALIST

## 2022-12-10 PROCEDURE — 87635 SARS-COV-2 COVID-19 AMP PRB: CPT | Performed by: CLINICAL NURSE SPECIALIST

## 2022-12-10 PROCEDURE — 94640 AIRWAY INHALATION TREATMENT: CPT

## 2022-12-10 PROCEDURE — 80053 COMPREHEN METABOLIC PANEL: CPT | Performed by: CLINICAL NURSE SPECIALIST

## 2022-12-10 RX ORDER — LEVOFLOXACIN 500 MG/1
500 TABLET, FILM COATED ORAL DAILY
Qty: 7 TABLET | Refills: 0 | Status: SHIPPED | OUTPATIENT
Start: 2022-12-10 | End: 2022-12-17

## 2022-12-10 RX ORDER — ALBUTEROL SULFATE 1.25 MG/3ML
1.25 SOLUTION RESPIRATORY (INHALATION) EVERY 6 HOURS PRN
Qty: 75 ML | Refills: 0 | Status: ON HOLD | OUTPATIENT
Start: 2022-12-10 | End: 2023-06-15

## 2022-12-10 RX ORDER — IPRATROPIUM BROMIDE AND ALBUTEROL SULFATE 2.5; .5 MG/3ML; MG/3ML
3 SOLUTION RESPIRATORY (INHALATION)
Status: COMPLETED | OUTPATIENT
Start: 2022-12-10 | End: 2022-12-10

## 2022-12-10 RX ORDER — PROMETHAZINE HYDROCHLORIDE AND DEXTROMETHORPHAN HYDROBROMIDE 6.25; 15 MG/5ML; MG/5ML
5 SYRUP ORAL EVERY 4 HOURS PRN
Qty: 120 ML | Refills: 0 | Status: SHIPPED | OUTPATIENT
Start: 2022-12-10 | End: 2022-12-20

## 2022-12-10 RX ADMIN — IPRATROPIUM BROMIDE AND ALBUTEROL SULFATE 3 ML: .5; 3 SOLUTION RESPIRATORY (INHALATION) at 11:12

## 2022-12-10 NOTE — ED PROVIDER NOTES
Encounter Date: 12/10/2022       History     Chief Complaint   Patient presents with    Shortness of Breath     Patient reports that she feels congestion in her chest and feels SOB for about a week. Reports bilateral rib and back pain from coughing.  Recent shoulder surgery due to infection.      42-year-old female presents to the emergency room with chest congestion, feeling short of breath the last week.  Temperature 99.6°.  Pulse 116.  O2 on room air is 99%.  History of anemia, anxiety, IV drug use.  Patient also reports a she has shoulder surgery in November    Review of patient's allergies indicates:   Allergen Reactions    Bee venom protein (honey bee) Anaphylaxis     Patient reports she is allergic to bee stings.    Wasp sting [allergen ext-venom-honey bee] Anaphylaxis    Adhesive Blisters    Iodine and iodide containing products Hives     Allergic to iodine in seafood only    Naproxen Other (See Comments)     Throat closing    Shellfish containing products     Nuts [tree nut] Rash     Past Medical History:   Diagnosis Date    Anemia     Anxiety     Depressed     Diskitis     Hep C w/o coma, chronic     IV drug abuse     Kidney stone     Liver cirrhosis      Past Surgical History:   Procedure Laterality Date    ARTHROTOMY OF SHOULDER Left 11/15/2022    Procedure: ARTHROTOMY, SHOULDER;  Surgeon: Bjorn Hayes MD;  Location: [facility];  Service: Orthopedics;  Laterality: Left;  Left acromioclavicular joint arthrotomy    BACK SURGERY      benine tumor removal      forehead, age 9    CHOLECYSTECTOMY      KIDNEY SURGERY      LUMBAR FUSION Bilateral 3/2/2022    Procedure: FUSION, SPINE, LUMBAR;  Surgeon: Guille Templeton MD;  Location: [address];  Service: Neurosurgery;  Laterality: Bilateral;  AIRO  T10--Pelvis    REMOVAL OF HARDWARE FROM SPINE N/A 2/21/2022    Procedure: REMOVAL, HARDWARE, SPINE;  Surgeon: Guille Templeton MD;  Location: [address];  Service: Neurosurgery;  Laterality: N/A;  [place]     SPINE SURGERY       Family History   Problem Relation Age of Onset    Hepatitis Mother     Drug abuse Mother     Liver cancer Mother     Drug abuse Father     Cirrhosis Father     Hepatitis Father      Social History     Tobacco Use    Smoking status: Some Days     Packs/day: 0.50     Years: 23.00     Pack years: 11.50     Types: Cigarettes    Smokeless tobacco: Never    Tobacco comments:     patient states she knows she nees to quit.   Substance Use Topics    Alcohol use: Not Currently     Comment: quit 2014    Drug use: Yes     Frequency: 4.0 times per week     Types: Marijuana     Comment: Patient denies needle use, only inhalation of methampehtamines or orally.  Last known drug use was prior to admission to hospital stay for surgery     Review of Systems   Constitutional:  Negative for fever.   HENT:  Positive for congestion. Negative for sore throat.    Respiratory:  Positive for shortness of breath.    Cardiovascular:  Negative for chest pain.   Gastrointestinal:  Negative for nausea.   Genitourinary:  Negative for dysuria.   Musculoskeletal:  Negative for back pain.   Skin:  Negative for rash.   Neurological:  Positive for weakness.   Hematological:  Does not bruise/bleed easily.   Psychiatric/Behavioral:  The patient is nervous/anxious.    All other systems reviewed and are negative.    Physical Exam     Initial Vitals [12/10/22 1031]   BP Pulse Resp Temp SpO2   130/60 (!) 116 20 99.6 °F (37.6 °C) 95 %      MAP       --         Physical Exam    Nursing note and vitals reviewed.  Constitutional: She appears well-developed and well-nourished.   HENT:   Head: Normocephalic and atraumatic.   Eyes: Pupils are equal, round, and reactive to light.   Neck:   Normal range of motion.  Cardiovascular:  Normal rate and regular rhythm.           Pulmonary/Chest: She has rhonchi.   Abdominal: Abdomen is soft. Bowel sounds are normal.   Musculoskeletal:         General: Normal range of motion.      Cervical back: Normal  range of motion.     Neurological: She is alert and oriented to person, place, and time.   Skin: Skin is warm and dry.   Psychiatric: She has a normal mood and affect.       ED Course   Procedures  Labs Reviewed   CBC W/ AUTO DIFFERENTIAL - Abnormal; Notable for the following components:       Result Value    RBC 3.22 (*)     Hemoglobin 9.1 (*)     Hematocrit 27.4 (*)     RDW 15.9 (*)     Platelets 98 (*)     Immature Granulocytes 1.3 (*)     Immature Grans (Abs) 0.10 (*)     Lymph # 0.9 (*)     Gran % 75.8 (*)     Lymph % 10.8 (*)     All other components within normal limits   COMPREHENSIVE METABOLIC PANEL - Abnormal; Notable for the following components:    Sodium 131 (*)     Calcium 8.1 (*)     Albumin 2.4 (*)     Total Bilirubin 2.5 (*)     Alkaline Phosphatase 136 (*)     AST 41 (*)     Anion Gap 5 (*)     All other components within normal limits   NT-PRO NATRIURETIC PEPTIDE   SARS-COV-2 RDRP GENE    Narrative:     This test utilizes isothermal nucleic acid amplification technology to detect the SARS-CoV-2 RdRp nucleic acid segment. The analytical sensitivity (limit of detection) is 500 copies/swab.     A POSITIVE result is indicative of the presence of SARS-CoV-2 RNA; clinical correlation with patient history and other diagnostic information is necessary to determine patient infection status.    A NEGATIVE result means that SARS-CoV-2 nucleic acids are not present above the limit of detection. A NEGATIVE result should be treated as presumptive. It does not rule out the possibility of COVID-19 and should not be the sole basis for treatment decisions. If COVID-19 is strongly suspected based on clinical and exposure history, re-testing using an alternate molecular assay should be considered.     This test is only for use under the Food and Drug Administration s Emergency Use Authorization (EUA).     Commercial kits are provided by [employer]. Performance characteristics of the EUA have been  independently verified by [facility] Department of Pathology and Laboratory Medicine.   _________________________________________________________________   The authorized Fact Sheet for Healthcare Providers and the authorized Fact Sheet for Patients of the ID NOW COVID-19 are available on the FDA website:    https://www.fda.gov/media/202063/download      https://www.fda.gov/media/042266/download       POCT INFLUENZA A/B MOLECULAR          Imaging Results              X-Ray Chest AP Portable (In process)                      Medications   albuterol-ipratropium 2.5 mg-0.5 mg/3 mL nebulizer solution 3 mL (3 mLs Nebulization Given 12/10/22 1106)     Medical Decision Making:   Differential Diagnosis:   Bronchitis, flu, COVID, pneumonia, PE  Clinical Tests:   Lab Tests: Ordered and Reviewed  Radiological Study: Ordered and Reviewed  Other:   I have discussed this case with another health care provider.       <> Summary of the Discussion: Chest x-ray shows some consolidation will treat with antibiotics and cough medications                        Clinical Impression:   Final diagnoses:  [R06.02] SOB (shortness of breath)        ED Disposition Condition    Discharge Stable          ED Prescriptions       Medication Sig Dispense Start Date End Date Auth. Provider    levoFLOXacin (LEVAQUIN) 500 MG tablet Take 1 tablet (500 mg total) by mouth once daily. for 7 days 7 tablet 12/10/2022 12/17/2022 Farzana Elizabeth NP    promethazine-dextromethorphan (PROMETHAZINE-DM) 6.25-15 mg/5 mL Syrp Take 5 mLs by mouth every 4 (four) hours as needed (cough). 120 mL 12/10/2022 12/20/2022 Farzana Elizabeth NP    albuterol (ACCUNEB) 1.25 mg/3 mL Nebu Take 3 mLs (1.25 mg total) by nebulization every 6 (six) hours as needed (shortness of breath). Rescue 75 mL 12/10/2022 12/10/2023 Farzana Elizabeth NP          Follow-up Information       Follow up With Specialties Details Why Contact Info    Dannielle Merino, DO  Family Medicine  As needed [address]  [address]  [facility] [address]  [phone]               Farzana Elizabeth NP  12/10/22 1133

## 2022-12-23 ENCOUNTER — CLINICAL SUPPORT (OUTPATIENT)
Dept: CARDIOLOGY | Facility: HOSPITAL | Age: 42
DRG: 871 | End: 2022-12-23
Attending: INTERNAL MEDICINE
Payer: MEDICAID

## 2022-12-23 ENCOUNTER — HOSPITAL ENCOUNTER (INPATIENT)
Facility: HOSPITAL | Age: 42
LOS: 27 days | Discharge: ANOTHER HOSPITAL | DRG: 871 | End: 2023-01-19
Attending: EMERGENCY MEDICINE | Admitting: INTERNAL MEDICINE
Payer: MEDICAID

## 2022-12-23 DIAGNOSIS — R31.9 URINARY TRACT INFECTION WITH HEMATURIA, SITE UNSPECIFIED: ICD-10-CM

## 2022-12-23 DIAGNOSIS — N39.0 URINARY TRACT INFECTION WITH HEMATURIA, SITE UNSPECIFIED: ICD-10-CM

## 2022-12-23 DIAGNOSIS — M48.061 SPINAL STENOSIS AT L4-L5 LEVEL: ICD-10-CM

## 2022-12-23 DIAGNOSIS — E86.0 DEHYDRATION: ICD-10-CM

## 2022-12-23 DIAGNOSIS — A41.9 SEPSIS, DUE TO UNSPECIFIED ORGANISM, UNSPECIFIED WHETHER ACUTE ORGAN DYSFUNCTION PRESENT: Primary | ICD-10-CM

## 2022-12-23 DIAGNOSIS — M00.9 PYOGENIC ARTHRITIS OF LEFT SHOULDER REGION, DUE TO UNSPECIFIED ORGANISM: ICD-10-CM

## 2022-12-23 DIAGNOSIS — Z98.1 S/P SPINAL FUSION: ICD-10-CM

## 2022-12-23 DIAGNOSIS — E83.42 HYPOMAGNESEMIA: ICD-10-CM

## 2022-12-23 DIAGNOSIS — R53.81 PHYSICAL DEBILITY: ICD-10-CM

## 2022-12-23 DIAGNOSIS — E87.6 HYPOKALEMIA: ICD-10-CM

## 2022-12-23 DIAGNOSIS — R53.81 DEBILITY: ICD-10-CM

## 2022-12-23 DIAGNOSIS — R00.0 TACHYCARDIA: ICD-10-CM

## 2022-12-23 DIAGNOSIS — L03.116 LEFT LEG CELLULITIS: ICD-10-CM

## 2022-12-23 DIAGNOSIS — R10.9 FLANK PAIN: ICD-10-CM

## 2022-12-23 DIAGNOSIS — R01.1 MURMUR, CARDIAC: ICD-10-CM

## 2022-12-23 PROBLEM — R65.20 SEVERE SEPSIS: Status: ACTIVE | Noted: 2022-12-23

## 2022-12-23 LAB
ALBUMIN SERPL BCP-MCNC: 2.2 G/DL (ref 3.5–5.2)
ALP SERPL-CCNC: 142 U/L (ref 55–135)
ALT SERPL W/O P-5'-P-CCNC: 19 U/L (ref 10–44)
AMPHET+METHAMPHET UR QL: ABNORMAL
ANION GAP SERPL CALC-SCNC: 5 MMOL/L (ref 8–16)
AORTIC ROOT ANNULUS: 3.23 CM
AORTIC VALVE CUSP SEPERATION: 1.84 CM
AST SERPL-CCNC: 47 U/L (ref 10–40)
AV INDEX (PROSTH): 0.78
AV MEAN GRADIENT: 8 MMHG
AV PEAK GRADIENT: 15 MMHG
AV VALVE AREA: 2.44 CM2
AV VELOCITY RATIO: 0.72
B-HCG UR QL: NEGATIVE
BACTERIA #/AREA URNS HPF: NEGATIVE /HPF
BARBITURATES UR QL SCN>200 NG/ML: NEGATIVE
BASOPHILS # BLD AUTO: 0.02 K/UL (ref 0–0.2)
BASOPHILS NFR BLD: 0.3 % (ref 0–1.9)
BENZODIAZ UR QL SCN>200 NG/ML: NEGATIVE
BILIRUB SERPL-MCNC: 1.8 MG/DL (ref 0.1–1)
BILIRUB UR QL STRIP: ABNORMAL
BUN SERPL-MCNC: 11 MG/DL (ref 6–20)
BZE UR QL SCN: NEGATIVE
CALCIUM SERPL-MCNC: 7.5 MG/DL (ref 8.7–10.5)
CANNABINOIDS UR QL SCN: ABNORMAL
CHLORIDE SERPL-SCNC: 105 MMOL/L (ref 95–110)
CLARITY UR: CLEAR
CO2 SERPL-SCNC: 26 MMOL/L (ref 23–29)
COLOR UR: YELLOW
CREAT SERPL-MCNC: 0.8 MG/DL (ref 0.5–1.4)
CREAT UR-MCNC: 154 MG/DL (ref 15–325)
CTP QC/QA: YES
CTP QC/QA: YES
CV ECHO LV RWT: 0.4 CM
DIFFERENTIAL METHOD: ABNORMAL
DOP CALC AO PEAK VEL: 1.92 M/S
DOP CALC AO VTI: 29.9 CM
DOP CALC LVOT AREA: 3.1 CM2
DOP CALC LVOT DIAMETER: 2 CM
DOP CALC LVOT PEAK VEL: 1.38 M/S
DOP CALC LVOT STROKE VOLUME: 72.85 CM3
DOP CALCLVOT PEAK VEL VTI: 23.2 CM
ECHO LV POSTERIOR WALL: 1.11 CM (ref 0.6–1.1)
EJECTION FRACTION: 64 %
EOSINOPHIL # BLD AUTO: 0.1 K/UL (ref 0–0.5)
EOSINOPHIL NFR BLD: 0.6 % (ref 0–8)
ERYTHROCYTE [DISTWIDTH] IN BLOOD BY AUTOMATED COUNT: 17.1 % (ref 11.5–14.5)
EST. GFR  (NO RACE VARIABLE): >60 ML/MIN/1.73 M^2
FRACTIONAL SHORTENING: 35 % (ref 28–44)
GLUCOSE SERPL-MCNC: 85 MG/DL (ref 70–110)
GLUCOSE UR QL STRIP: NEGATIVE
HCT VFR BLD AUTO: 27.7 % (ref 37–48.5)
HGB BLD-MCNC: 8.7 G/DL (ref 12–16)
HGB UR QL STRIP: NEGATIVE
HYALINE CASTS #/AREA URNS LPF: 2.3 /LPF
IMM GRANULOCYTES # BLD AUTO: 0.04 K/UL (ref 0–0.04)
IMM GRANULOCYTES NFR BLD AUTO: 0.5 % (ref 0–0.5)
INTERVENTRICULAR SEPTUM: 1.49 CM (ref 0.6–1.1)
IVC DIAMETER: 2.72 CM
IVRT: 96.89 MSEC
KETONES UR QL STRIP: NEGATIVE
LA MAJOR: 6.61 CM
LACTATE SERPL-SCNC: 1.8 MMOL/L (ref 0.5–2.2)
LACTATE SERPL-SCNC: 3.6 MMOL/L (ref 0.5–2.2)
LEFT ATRIUM SIZE: 4.7 CM
LEFT INTERNAL DIMENSION IN SYSTOLE: 3.59 CM (ref 2.1–4)
LEFT VENTRICLE DIASTOLIC VOLUME INDEX: 72.37 ML/M2
LEFT VENTRICLE DIASTOLIC VOLUME: 151.26 ML
LEFT VENTRICLE MASS INDEX: 148 G/M2
LEFT VENTRICLE SYSTOLIC VOLUME INDEX: 26 ML/M2
LEFT VENTRICLE SYSTOLIC VOLUME: 54.24 ML
LEFT VENTRICULAR INTERNAL DIMENSION IN DIASTOLE: 5.56 CM (ref 3.5–6)
LEFT VENTRICULAR MASS: 309.65 G
LEUKOCYTE ESTERASE UR QL STRIP: ABNORMAL
LVOT MG: 3.62 MMHG
LVOT MV: 0.87 CM/S
LYMPHOCYTES # BLD AUTO: 0.3 K/UL (ref 1–4.8)
LYMPHOCYTES NFR BLD: 3.2 % (ref 18–48)
MAGNESIUM SERPL-MCNC: 1.5 MG/DL (ref 1.6–2.6)
MCH RBC QN AUTO: 28 PG (ref 27–31)
MCHC RBC AUTO-ENTMCNC: 31.4 G/DL (ref 32–36)
MCV RBC AUTO: 89 FL (ref 82–98)
METHADONE UR QL SCN>300 NG/ML: ABNORMAL
MICROSCOPIC COMMENT: ABNORMAL
MONOCYTES # BLD AUTO: 0.4 K/UL (ref 0.3–1)
MONOCYTES NFR BLD: 5.2 % (ref 4–15)
MV PEAK E VEL: 1.4 M/S
MV STENOSIS PRESSURE HALF TIME: 67.55 MS
MV VALVE AREA P 1/2 METHOD: 3.26 CM2
NEUTROPHILS # BLD AUTO: 7 K/UL (ref 1.8–7.7)
NEUTROPHILS NFR BLD: 90.2 % (ref 38–73)
NITRITE UR QL STRIP: NEGATIVE
NRBC BLD-RTO: 0 /100 WBC
OPIATES UR QL SCN: NEGATIVE
PCP UR QL SCN>25 NG/ML: NEGATIVE
PH UR STRIP: 6 [PH] (ref 5–8)
PISA TR MAX VEL: 3.02 M/S
PLATELET # BLD AUTO: 81 K/UL (ref 150–450)
PMV BLD AUTO: 8.4 FL (ref 9.2–12.9)
POC MOLECULAR INFLUENZA A AGN: NEGATIVE
POC MOLECULAR INFLUENZA B AGN: NEGATIVE
POTASSIUM SERPL-SCNC: 3.4 MMOL/L (ref 3.5–5.1)
PROT SERPL-MCNC: 7.6 G/DL (ref 6–8.4)
PROT UR QL STRIP: ABNORMAL
PV MV: 0.78 M/S
PV PEAK VELOCITY: 1.06 CM/S
RA MAJOR: 6.54 CM
RA PRESSURE: 8 MMHG
RA WIDTH: 5.1 CM
RBC # BLD AUTO: 3.11 M/UL (ref 4–5.4)
RBC #/AREA URNS HPF: 6 /HPF (ref 0–4)
RIGHT VENTRICULAR END-DIASTOLIC DIMENSION: 2.85 CM
SARS-COV-2 RDRP RESP QL NAA+PROBE: NEGATIVE
SODIUM SERPL-SCNC: 136 MMOL/L (ref 136–145)
SP GR UR STRIP: 1.02 (ref 1–1.03)
SQUAMOUS #/AREA URNS HPF: 1 /HPF
TOXICOLOGY INFORMATION: ABNORMAL
TR MAX PG: 36 MMHG
TROPONIN I SERPL DL<=0.01 NG/ML-MCNC: 7.8 PG/ML (ref 0–60)
TROPONIN I SERPL DL<=0.01 NG/ML-MCNC: 9.4 PG/ML (ref 0–60)
TV PEAK E VEL: 1 M/S
TV REST PULMONARY ARTERY PRESSURE: 44 MMHG
URN SPEC COLLECT METH UR: ABNORMAL
UROBILINOGEN UR STRIP-ACNC: 1 EU/DL
WBC # BLD AUTO: 7.73 K/UL (ref 3.9–12.7)
WBC #/AREA URNS HPF: 13 /HPF (ref 0–5)

## 2022-12-23 PROCEDURE — 81025 URINE PREGNANCY TEST: CPT | Performed by: EMERGENCY MEDICINE

## 2022-12-23 PROCEDURE — 93005 ELECTROCARDIOGRAM TRACING: CPT

## 2022-12-23 PROCEDURE — 81000 URINALYSIS NONAUTO W/SCOPE: CPT | Mod: 59 | Performed by: EMERGENCY MEDICINE

## 2022-12-23 PROCEDURE — 84484 ASSAY OF TROPONIN QUANT: CPT | Performed by: EMERGENCY MEDICINE

## 2022-12-23 PROCEDURE — 63600175 PHARM REV CODE 636 W HCPCS: Performed by: INTERNAL MEDICINE

## 2022-12-23 PROCEDURE — 99285 EMERGENCY DEPT VISIT HI MDM: CPT | Mod: 25

## 2022-12-23 PROCEDURE — 25000003 PHARM REV CODE 250: Performed by: EMERGENCY MEDICINE

## 2022-12-23 PROCEDURE — 87635 SARS-COV-2 COVID-19 AMP PRB: CPT | Performed by: EMERGENCY MEDICINE

## 2022-12-23 PROCEDURE — 87077 CULTURE AEROBIC IDENTIFY: CPT | Performed by: EMERGENCY MEDICINE

## 2022-12-23 PROCEDURE — 36556 INSERT NON-TUNNEL CV CATH: CPT

## 2022-12-23 PROCEDURE — 83735 ASSAY OF MAGNESIUM: CPT | Performed by: EMERGENCY MEDICINE

## 2022-12-23 PROCEDURE — 93306 TTE W/DOPPLER COMPLETE: CPT

## 2022-12-23 PROCEDURE — 85025 COMPLETE CBC W/AUTO DIFF WBC: CPT | Performed by: EMERGENCY MEDICINE

## 2022-12-23 PROCEDURE — 63600175 PHARM REV CODE 636 W HCPCS: Performed by: EMERGENCY MEDICINE

## 2022-12-23 PROCEDURE — 25000003 PHARM REV CODE 250: Performed by: INTERNAL MEDICINE

## 2022-12-23 PROCEDURE — 93010 EKG 12-LEAD: ICD-10-PCS | Mod: ,,, | Performed by: INTERNAL MEDICINE

## 2022-12-23 PROCEDURE — 36415 COLL VENOUS BLD VENIPUNCTURE: CPT | Performed by: EMERGENCY MEDICINE

## 2022-12-23 PROCEDURE — 87040 BLOOD CULTURE FOR BACTERIA: CPT | Performed by: EMERGENCY MEDICINE

## 2022-12-23 PROCEDURE — A4216 STERILE WATER/SALINE, 10 ML: HCPCS | Performed by: INTERNAL MEDICINE

## 2022-12-23 PROCEDURE — 83605 ASSAY OF LACTIC ACID: CPT | Mod: 91 | Performed by: EMERGENCY MEDICINE

## 2022-12-23 PROCEDURE — 87186 SC STD MICRODIL/AGAR DIL: CPT | Performed by: EMERGENCY MEDICINE

## 2022-12-23 PROCEDURE — C1751 CATH, INF, PER/CENT/MIDLINE: HCPCS

## 2022-12-23 PROCEDURE — 96375 TX/PRO/DX INJ NEW DRUG ADDON: CPT

## 2022-12-23 PROCEDURE — 51702 INSERT TEMP BLADDER CATH: CPT

## 2022-12-23 PROCEDURE — 96361 HYDRATE IV INFUSION ADD-ON: CPT

## 2022-12-23 PROCEDURE — 96365 THER/PROPH/DIAG IV INF INIT: CPT

## 2022-12-23 PROCEDURE — 87106 FUNGI IDENTIFICATION YEAST: CPT | Performed by: EMERGENCY MEDICINE

## 2022-12-23 PROCEDURE — 96372 THER/PROPH/DIAG INJ SC/IM: CPT | Performed by: EMERGENCY MEDICINE

## 2022-12-23 PROCEDURE — 80307 DRUG TEST PRSMV CHEM ANLYZR: CPT | Performed by: EMERGENCY MEDICINE

## 2022-12-23 PROCEDURE — 96367 TX/PROPH/DG ADDL SEQ IV INF: CPT

## 2022-12-23 PROCEDURE — 80053 COMPREHEN METABOLIC PANEL: CPT | Performed by: EMERGENCY MEDICINE

## 2022-12-23 PROCEDURE — 93010 ELECTROCARDIOGRAM REPORT: CPT | Mod: ,,, | Performed by: INTERNAL MEDICINE

## 2022-12-23 PROCEDURE — 87086 URINE CULTURE/COLONY COUNT: CPT | Performed by: EMERGENCY MEDICINE

## 2022-12-23 PROCEDURE — 20000000 HC ICU ROOM

## 2022-12-23 PROCEDURE — 87088 URINE BACTERIA CULTURE: CPT | Performed by: EMERGENCY MEDICINE

## 2022-12-23 RX ORDER — SODIUM CHLORIDE 0.9 % (FLUSH) 0.9 %
10 SYRINGE (ML) INJECTION
Status: DISCONTINUED | OUTPATIENT
Start: 2022-12-23 | End: 2023-01-05

## 2022-12-23 RX ORDER — IBUPROFEN 800 MG/1
TABLET ORAL
Status: DISPENSED
Start: 2022-12-23 | End: 2022-12-23

## 2022-12-23 RX ORDER — SODIUM CHLORIDE 0.9 % (FLUSH) 0.9 %
10 SYRINGE (ML) INJECTION
Status: DISCONTINUED | OUTPATIENT
Start: 2022-12-23 | End: 2023-01-04

## 2022-12-23 RX ORDER — NOREPINEPHRINE BITARTRATE/D5W 4MG/250ML
PLASTIC BAG, INJECTION (ML) INTRAVENOUS
Status: DISPENSED
Start: 2022-12-23 | End: 2022-12-23

## 2022-12-23 RX ORDER — IBUPROFEN 800 MG/1
800 TABLET ORAL
Status: COMPLETED | OUTPATIENT
Start: 2022-12-23 | End: 2022-12-23

## 2022-12-23 RX ORDER — SODIUM CHLORIDE 0.9 % (FLUSH) 0.9 %
10 SYRINGE (ML) INJECTION EVERY 6 HOURS
Status: DISCONTINUED | OUTPATIENT
Start: 2022-12-23 | End: 2023-01-05

## 2022-12-23 RX ORDER — MUPIROCIN 20 MG/G
OINTMENT TOPICAL 2 TIMES DAILY
Status: COMPLETED | OUTPATIENT
Start: 2022-12-23 | End: 2022-12-27

## 2022-12-23 RX ORDER — IBUPROFEN 400 MG/1
400 TABLET ORAL EVERY 6 HOURS PRN
Status: DISCONTINUED | OUTPATIENT
Start: 2022-12-23 | End: 2023-01-19 | Stop reason: HOSPADM

## 2022-12-23 RX ORDER — MAGNESIUM SULFATE 1 G/100ML
INJECTION INTRAVENOUS
Status: DISPENSED
Start: 2022-12-23 | End: 2022-12-23

## 2022-12-23 RX ORDER — ORPHENADRINE CITRATE 30 MG/ML
60 INJECTION INTRAMUSCULAR; INTRAVENOUS
Status: COMPLETED | OUTPATIENT
Start: 2022-12-23 | End: 2022-12-23

## 2022-12-23 RX ORDER — PROMETHAZINE HYDROCHLORIDE 25 MG/1
25 SUPPOSITORY RECTAL EVERY 6 HOURS PRN
Status: DISCONTINUED | OUTPATIENT
Start: 2022-12-23 | End: 2023-01-19 | Stop reason: HOSPADM

## 2022-12-23 RX ORDER — PIPERACILLIN SODIUM, TAZOBACTAM SODIUM 4; .5 G/20ML; G/20ML
INJECTION, POWDER, LYOPHILIZED, FOR SOLUTION INTRAVENOUS
Status: DISPENSED
Start: 2022-12-23 | End: 2022-12-23

## 2022-12-23 RX ORDER — TALC
6 POWDER (GRAM) TOPICAL NIGHTLY PRN
Status: DISCONTINUED | OUTPATIENT
Start: 2022-12-23 | End: 2023-01-19 | Stop reason: HOSPADM

## 2022-12-23 RX ORDER — PREGABALIN 75 MG/1
75 CAPSULE ORAL 2 TIMES DAILY
Status: DISCONTINUED | OUTPATIENT
Start: 2022-12-23 | End: 2023-01-03

## 2022-12-23 RX ORDER — SODIUM CHLORIDE 9 MG/ML
1000 INJECTION, SOLUTION INTRAVENOUS CONTINUOUS
Status: ACTIVE | OUTPATIENT
Start: 2022-12-23 | End: 2022-12-23

## 2022-12-23 RX ORDER — VANCOMYCIN HYDROCHLORIDE 1 G/20ML
INJECTION, POWDER, LYOPHILIZED, FOR SOLUTION INTRAVENOUS
Status: DISPENSED
Start: 2022-12-23 | End: 2022-12-23

## 2022-12-23 RX ORDER — SODIUM CHLORIDE 9 MG/ML
1000 INJECTION, SOLUTION INTRAVENOUS
Status: COMPLETED | OUTPATIENT
Start: 2022-12-23 | End: 2022-12-23

## 2022-12-23 RX ORDER — ONDANSETRON 2 MG/ML
4 INJECTION INTRAMUSCULAR; INTRAVENOUS EVERY 8 HOURS PRN
Status: DISCONTINUED | OUTPATIENT
Start: 2022-12-23 | End: 2023-01-09

## 2022-12-23 RX ORDER — ORPHENADRINE CITRATE 30 MG/ML
INJECTION INTRAMUSCULAR; INTRAVENOUS
Status: DISPENSED
Start: 2022-12-23 | End: 2022-12-23

## 2022-12-23 RX ORDER — NOREPINEPHRINE BITARTRATE/D5W 4MG/250ML
0-3 PLASTIC BAG, INJECTION (ML) INTRAVENOUS CONTINUOUS
Status: DISCONTINUED | OUTPATIENT
Start: 2022-12-23 | End: 2022-12-26

## 2022-12-23 RX ORDER — CIPROFLOXACIN 500 MG/1
500 TABLET ORAL 2 TIMES DAILY
Status: ON HOLD | COMMUNITY
Start: 2022-12-20 | End: 2023-01-04 | Stop reason: HOSPADM

## 2022-12-23 RX ORDER — IBUPROFEN 400 MG/1
400 TABLET ORAL 3 TIMES DAILY PRN
Status: DISCONTINUED | OUTPATIENT
Start: 2022-12-23 | End: 2022-12-23

## 2022-12-23 RX ORDER — PANTOPRAZOLE SODIUM 40 MG/1
40 TABLET, DELAYED RELEASE ORAL DAILY
Status: DISCONTINUED | OUTPATIENT
Start: 2022-12-23 | End: 2023-01-19 | Stop reason: HOSPADM

## 2022-12-23 RX ORDER — VANCOMYCIN 1.75 GRAM/500 ML IN 0.9 % SODIUM CHLORIDE INTRAVENOUS
1750
Status: DISCONTINUED | OUTPATIENT
Start: 2022-12-23 | End: 2022-12-26

## 2022-12-23 RX ORDER — MAGNESIUM SULFATE 1 G/100ML
1 INJECTION INTRAVENOUS
Status: COMPLETED | OUTPATIENT
Start: 2022-12-23 | End: 2022-12-23

## 2022-12-23 RX ADMIN — IBUPROFEN 400 MG: 400 TABLET, FILM COATED ORAL at 03:12

## 2022-12-23 RX ADMIN — SODIUM CHLORIDE 1000 ML: 9 INJECTION, SOLUTION INTRAVENOUS at 09:12

## 2022-12-23 RX ADMIN — IBUPROFEN 800 MG: 800 TABLET, FILM COATED ORAL at 02:12

## 2022-12-23 RX ADMIN — Medication 1750 MG: at 06:12

## 2022-12-23 RX ADMIN — SODIUM CHLORIDE 1520 ML: 9 INJECTION, SOLUTION INTRAVENOUS at 03:12

## 2022-12-23 RX ADMIN — ORPHENADRINE CITRATE 60 MG: 30 INJECTION INTRAMUSCULAR; INTRAVENOUS at 02:12

## 2022-12-23 RX ADMIN — SODIUM CHLORIDE 1000 ML: 9 INJECTION, SOLUTION INTRAVENOUS at 04:12

## 2022-12-23 RX ADMIN — MAGNESIUM SULFATE IN DEXTROSE 1 G: 10 INJECTION, SOLUTION INTRAVENOUS at 03:12

## 2022-12-23 RX ADMIN — SODIUM CHLORIDE 1000 ML: 9 INJECTION, SOLUTION INTRAVENOUS at 02:12

## 2022-12-23 RX ADMIN — SODIUM CHLORIDE 1000 ML: 9 INJECTION, SOLUTION INTRAVENOUS at 05:12

## 2022-12-23 RX ADMIN — Medication 10 ML: at 12:12

## 2022-12-23 RX ADMIN — NOREPINEPHRINE BITARTRATE 0.05 MCG/KG/MIN: 4 INJECTION, SOLUTION INTRAVENOUS at 05:12

## 2022-12-23 RX ADMIN — PREGABALIN 75 MG: 75 CAPSULE ORAL at 12:12

## 2022-12-23 RX ADMIN — MUPIROCIN: 20 OINTMENT TOPICAL at 09:12

## 2022-12-23 RX ADMIN — PIPERACILLIN AND TAZOBACTAM 4.5 G: 4; .5 INJECTION, POWDER, LYOPHILIZED, FOR SOLUTION INTRAVENOUS; PARENTERAL at 08:12

## 2022-12-23 RX ADMIN — PANTOPRAZOLE SODIUM 40 MG: 40 TABLET, DELAYED RELEASE ORAL at 09:12

## 2022-12-23 RX ADMIN — PIPERACILLIN AND TAZOBACTAM 4.5 G: 4; .5 INJECTION, POWDER, LYOPHILIZED, FOR SOLUTION INTRAVENOUS; PARENTERAL at 04:12

## 2022-12-23 RX ADMIN — MUPIROCIN: 20 OINTMENT TOPICAL at 08:12

## 2022-12-23 RX ADMIN — POTASSIUM BICARBONATE 20 MEQ: 391 TABLET, EFFERVESCENT ORAL at 03:12

## 2022-12-23 RX ADMIN — VANCOMYCIN HYDROCHLORIDE 2000 MG: 1 INJECTION, POWDER, LYOPHILIZED, FOR SOLUTION INTRAVENOUS at 05:12

## 2022-12-23 RX ADMIN — Medication 10 ML: at 06:12

## 2022-12-23 RX ADMIN — PREGABALIN 75 MG: 75 CAPSULE ORAL at 08:12

## 2022-12-23 RX ADMIN — PIPERACILLIN AND TAZOBACTAM 4.5 G: 4; .5 INJECTION, POWDER, LYOPHILIZED, FOR SOLUTION INTRAVENOUS; PARENTERAL at 01:12

## 2022-12-23 NOTE — ASSESSMENT & PLAN NOTE
This patient does have evidence of infective focus  My overall impression is sepsis. Vital signs were reviewed and noted in progress note.  Antibiotics given-   Antibiotics (From admission, onward)    Start     Stop Route Frequency Ordered    12/23/22 1800  vancomycin 1750 mg in 0.9% sodium chloride 500 mL IVPB         -- IV Every 12 hours (non-standard times) 12/23/22 0653    12/23/22 1245  piperacillin-tazobactam (ZOSYN) 4.5 g in dextrose 5 % in water (D5W) 5 % 100 mL IVPB (MB+)         -- IV Every 8 hours (non-standard times) 12/23/22 0647    12/23/22 0945  mupirocin 2 % ointment         12/28 0859 Nasl 2 times daily 12/23/22 0839    12/23/22 0647  vancomycin - pharmacy to dose  (vancomycin IVPB)        See Hyperspace for full Linked Orders Report.    -- IV pharmacy to manage frequency 12/23/22 0647        Cultures were taken-   Microbiology Results (last 7 days)     Procedure Component Value Units Date/Time    Urine culture [[id number]] Collected: 12/23/22 0501    Order Status: No result Specimen: Urine Updated: 12/23/22 0514    Blood culture #1 **CANNOT BE ORDERED STAT** [[id number]] Collected: 12/23/22 0258    Order Status: Sent Specimen: Blood Updated: 12/23/22 0258    Blood culture #2 **CANNOT BE ORDERED STAT** [[id number]] Collected: 12/23/22 0257    Order Status: Sent Specimen: Blood Updated: 12/23/22 0258        Latest lactate reviewed, they are-  Recent Labs   Lab 12/23/22 0258 12/23/22  0701   LACTATE 3.6* 1.8       Organ dysfunction indicated by Acute kidney injury, Encephalopathy  and Acute liver injury  Source- ?    Source control Achieved by- see orders

## 2022-12-23 NOTE — HPI
ER HPI:  42-year-old female with a history of anemia, anxiety, recurrent cellulitis, hepatitis C, IVDU, liver cirrhosis, cholecystectomy, kidney stones, lumbar fusion, shoulder surgery, chronic back pain comes in c/o generalized weakness, fatigue, and bilateral flank pain.  She reports that this has been going on intermittently for several days and was diagnosed with a UTI recently.  No fever.  No abdominal pain or vomiting or diarrhea.     IM HPI:  Patient is well known to our service.  Patient has a history of IV drug abuse and unfortunately has relapsed.  Patient has a history of a epidural abscess requiring a large lumbar surgery with multiple rehabilitative stays and therapy.  The patient was progressing to ambulation and recently has declined.  Patient admits to starting drugs including IV drug use again.  Patient presented to the ED with the above symptoms and she has a noticeable cardiac murmur today that we are getting a stat echocardiogram.  Patient's been started on antibiotics fluid resuscitated and seems to have sepsis.  Patient's urinary studies were abnormal but not overwhelming.

## 2022-12-23 NOTE — EICU
Intervention Initiated From:  [facility] / EICU    Patrick intervened regarding:  Rounding (Video assessment)    Comments: Video assessment complete. Pt lying in bed, appears asleep and comfortable. Levo gtt infusing at 0.05 mcg/kg/min, Map currently at 70 and VSS. NAD noted.

## 2022-12-23 NOTE — ED NOTES
Recalled dynamic access to check an ETA time on picc line, she stated she would be a note for the nurse to call with a ETA time

## 2022-12-23 NOTE — H&P
"[facility] Medicine  History & Physical    Patient Name: Rachel Zazueta  MRN: [id number]  Patient Class: IP- Inpatient  Admission Date: 12/23/2022  Attending Physician: Sai Red III, MD  Primary Care Provider: Dannielle Merino DO         Patient information was obtained from patient, past medical records and ER records.     Subjective:     Principal Problem:Severe sepsis    Chief Complaint:   Chief Complaint   Patient presents with    Fatigue     Pt presents to the ER via EMS w/ w/ complaints of weakness and lower back pain. Pt states she was ambulating at home prior to calling EMS, became weak and "let herself down to the ground." Upon arrival pt reports continued weakness, bilateral flank pain.         HPI: ER HPI:  42-year-old female with a history of anemia, anxiety, recurrent cellulitis, hepatitis C, IVDU, liver cirrhosis, cholecystectomy, kidney stones, lumbar fusion, shoulder surgery, chronic back pain comes in c/o generalized weakness, fatigue, and bilateral flank pain.  She reports that this has been going on intermittently for several days and was diagnosed with a UTI recently.  No fever.  No abdominal pain or vomiting or diarrhea.     IM HPI:  Patient is well known to our service.  Patient has a history of IV drug abuse and unfortunately has relapsed.  Patient has a history of a epidural abscess requiring a large lumbar surgery with multiple rehabilitative stays and therapy.  The patient was progressing to ambulation and recently has declined.  Patient admits to starting drugs including IV drug use again.  Patient presented to the ED with the above symptoms and she has a noticeable cardiac murmur today that we are getting a stat echocardiogram.  Patient's been started on antibiotics fluid resuscitated and seems to have sepsis.  Patient's urinary studies were abnormal but not overwhelming.      Past Medical History:   Diagnosis Date    Anemia     Anxiety     Depressed "     Diskitis     Hep C w/o coma, chronic     IV drug abuse     Kidney stone     Liver cirrhosis        Past Surgical History:   Procedure Laterality Date    ARTHROTOMY OF SHOULDER Left 11/15/2022    Procedure: ARTHROTOMY, SHOULDER;  Surgeon: Bjorn Hayes MD;  Location: [facility];  Service: Orthopedics;  Laterality: Left;  Left acromioclavicular joint arthrotomy    BACK SURGERY      benine tumor removal      forehead, age 9    CHOLECYSTECTOMY      KIDNEY SURGERY      LUMBAR FUSION Bilateral 3/2/2022    Procedure: FUSION, SPINE, LUMBAR;  Surgeon: Guille Templeton MD;  Location: [address];  Service: Neurosurgery;  Laterality: Bilateral;  AIRO  T10--Pelvis    REMOVAL OF HARDWARE FROM SPINE N/A 2022    Procedure: REMOVAL, HARDWARE, SPINE;  Surgeon: Guille Templeton MD;  Location: [address];  Service: Neurosurgery;  Laterality: N/A;  [place]    SPINE SURGERY         Review of patient's allergies indicates:   Allergen Reactions    Bee venom protein (honey bee) Anaphylaxis     Patient reports she is allergic to bee stings.    Naproxen Anaphylaxis     Throat closing    Wasp sting [allergen ext-venom-honey bee] Anaphylaxis    Adhesive Blisters    Iodine and iodide containing products Hives     Allergic to iodine in seafood only    Shellfish containing products     Nuts [tree nut] Rash       No current facility-administered medications on file prior to encounter.     Current Outpatient Medications on File Prior to Encounter   Medication Sig    acetaminophen (TYLENOL) 325 MG tablet Take 2 tablets (650 mg total) by mouth every 6 (six) hours as needed for Temperature greater than (100.4 F).    albuterol (ACCUNEB) 1.25 mg/3 mL Nebu Take 3 mLs (1.25 mg total) by nebulization every 6 (six) hours as needed (shortness of breath). Rescue    amoxicillin (AMOXIL) 500 MG capsule Take 2 capsules (1,000 mg total) by mouth every 12 (twelve) hours.    [] buprenorphine-naloxone 8-2 (SUBOXONE) 8-2 mg  Subl Place 1 tablet (8 mg total) under the tongue 2 (two) times a day.    buPROPion (WELLBUTRIN) 100 MG tablet Take 1 tablet (100 mg total) by mouth 2 (two) times daily.    ciprofloxacin HCl (CIPRO) 500 MG tablet Take 500 mg by mouth 2 (two) times daily.    folic acid (FOLVITE) 1 MG tablet Take 1 tablet (1 mg total) by mouth once daily. (Patient not taking: Reported on 11/25/2022)    pantoprazole (PROTONIX) 40 MG tablet Take 1 tablet (40 mg total) by mouth once daily.    pregabalin (LYRICA) 75 MG capsule Take 1 capsule (75 mg total) by mouth 3 (three) times daily.    sulfamethoxazole-trimethoprim 800-160mg (BACTRIM DS) 800-160 mg Tab Take 2 tablets by mouth 2 (two) times daily.    [DISCONTINUED] diclofenac sodium (VOLTAREN) 1 % Gel Apply 2 g topically 4 (four) times daily.     Family History       Problem Relation (Age of Onset)    Cirrhosis Father    Drug abuse Mother, Father    Hepatitis Mother, Father    Liver cancer Mother          Tobacco Use    Smoking status: Some Days     Packs/day: 0.50     Years: 23.00     Pack years: 11.50     Types: Cigarettes    Smokeless tobacco: Never    Tobacco comments:     patient states she knows she nees to quit.   Substance and Sexual Activity    Alcohol use: Not Currently     Comment: quit 2014    Drug use: Yes     Frequency: 4.0 times per week     Types: Marijuana     Comment: Patient denies needle use, only inhalation of methampehtamines or orally.  Last known drug use was prior to admission to hospital stay for surgery    Sexual activity: Yes     Partners: Male     Birth control/protection: None     Review of Systems   Unable to perform ROS: Acuity of condition   Objective:     Vital Signs (Most Recent):  Temp: 100.3 °F (37.9 °C) (12/23/22 0226)  Pulse: (!) 112 (12/23/22 0715)  Resp: 16 (12/23/22 0715)  BP: (!) 99/47 (12/23/22 0715)  SpO2: 98 % (12/23/22 0715)   Vital Signs (24h Range):  Temp:  [100.3 °F (37.9 °C)] 100.3 °F (37.9 °C)  Pulse:  [109-128]  112  Resp:  [11-38] 16  SpO2:  [97 %-99 %] 98 %  BP: ()/(36-61) 99/47     Weight: 97.5 kg (215 lb)  Body mass index is 33.67 kg/m².    Physical Exam  Vitals and nursing note reviewed.   Constitutional:       General: She is in acute distress.      Appearance: She is obese. She is ill-appearing.   HENT:      Head: Normocephalic and atraumatic.      Nose: Nose normal. No congestion or rhinorrhea.      Mouth/Throat:      Mouth: Mucous membranes are dry.      Pharynx: No oropharyngeal exudate.   Eyes:      General: No scleral icterus.  Neck:      Vascular: No carotid bruit.   Cardiovascular:      Rate and Rhythm: Regular rhythm. Tachycardia present.      Heart sounds: Murmur heard.     No friction rub. No gallop.   Pulmonary:      Effort: No respiratory distress.      Breath sounds: No stridor. No wheezing, rhonchi or rales.   Chest:      Chest wall: No tenderness.   Abdominal:      General: There is no distension.      Palpations: There is no mass.      Tenderness: There is no abdominal tenderness. There is no right CVA tenderness, left CVA tenderness, guarding or rebound.      Hernia: No hernia is present.   Musculoskeletal:         General: No swelling, tenderness or deformity.      Cervical back: Neck supple. No rigidity or tenderness.      Right lower leg: Edema present.      Left lower leg: Edema present.   Lymphadenopathy:      Cervical: No cervical adenopathy.   Skin:     Capillary Refill: Capillary refill takes less than 2 seconds.      Coloration: Skin is not jaundiced or pale.   Neurological:      Cranial Nerves: No cranial nerve deficit.      Sensory: Sensory deficit present.      Motor: Weakness present.   Psychiatric:      Comments: Drowsy, answers questions, moving all extremities.           Significant Labs: CBC:   Recent Labs   Lab 12/23/22  0258   WBC 7.73   HGB 8.7*   HCT 27.7*   PLT 81*     CMP:   Recent Labs   Lab 12/23/22  0258      K 3.4*      CO2 26   GLU 85   BUN 11    CREATININE 0.8   CALCIUM 7.5*   PROT 7.6   ALBUMIN 2.2*   BILITOT 1.8*   ALKPHOS 142*   AST 47*   ALT 19   ANIONGAP 5*     Urine Studies:   Recent Labs   Lab 12/23/22  0501   COLORU Yellow   APPEARANCEUA Clear   PHUR 6.0   SPECGRAV 1.025   PROTEINUA Trace*   GLUCUA Negative   KETONESU Negative   BILIRUBINUA 1+*   OCCULTUA Negative   NITRITE Negative   UROBILINOGEN 1.0   LEUKOCYTESUR 1+*   RBCUA 6*   WBCUA 13*   BACTERIA Negative   SQUAMEPITHEL 1   HYALINECASTS 2.3*       Significant Imaging: I have reviewed all pertinent imaging results/findings within the past 24 hours.    Assessment/Plan:     * Severe sepsis  This patient does have evidence of infective focus  My overall impression is sepsis. Vital signs were reviewed and noted in progress note.  Antibiotics given-   Antibiotics (From admission, onward)    Start     Stop Route Frequency Ordered    12/23/22 1800  vancomycin 1750 mg in 0.9% sodium chloride 500 mL IVPB         -- IV Every 12 hours (non-standard times) 12/23/22 0653    12/23/22 1245  piperacillin-tazobactam (ZOSYN) 4.5 g in dextrose 5 % in water (D5W) 5 % 100 mL IVPB (MB+)         -- IV Every 8 hours (non-standard times) 12/23/22 0647    12/23/22 0945  mupirocin 2 % ointment         12/28 0859 Nasl 2 times daily 12/23/22 0839    12/23/22 0647  vancomycin - pharmacy to dose  (vancomycin IVPB)        See Hyperspace for full Linked Orders Report.    -- IV pharmacy to manage frequency 12/23/22 0647        Cultures were taken-   Microbiology Results (last 7 days)     Procedure Component Value Units Date/Time    Urine culture [[id number]] Collected: 12/23/22 0501    Order Status: No result Specimen: Urine Updated: 12/23/22 0514    Blood culture #1 **CANNOT BE ORDERED STAT** [[id number]] Collected: 12/23/22 0258    Order Status: Sent Specimen: Blood Updated: 12/23/22 0258    Blood culture #2 **CANNOT BE ORDERED STAT** [[id number]] Collected: 12/23/22 0257    Order Status: Sent Specimen: Blood Updated:  12/23/22 0258        Latest lactate reviewed, they are-  Recent Labs   Lab 12/23/22 0258 12/23/22  0701   LACTATE 3.6* 1.8       Organ dysfunction indicated by Acute kidney injury, Encephalopathy  and Acute liver injury  Source- ?    Source control Achieved by- see orders      Murmur, cardiac  Need stat echocardiogram with her IVDU history and sepsis      Mild episode of recurrent major depressive disorder    Patient with altered mental status holding antidepressants for now      Cannabis use disorder, moderate, dependence  As above      Amphetamine use disorder, severe  Continue to  educate and support      Spinal stenosis at L4-L5 level        Substance use disorder    See above    Chronic hepatitis C with cirrhosis  Labs noted      Cirrhosis of liver without ascites  Labs noted      UTI (urinary tract infection)  On abx, ? Source.        VTE Risk Mitigation (From admission, onward)         Ordered     IP VTE HIGH RISK PATIENT  Once         12/23/22 0647     Place sequential compression device  Until discontinued         12/23/22 0647                   Sai Red III, MD  Department of Hospital Medicine   [place] - Intensive Care

## 2022-12-23 NOTE — SUBJECTIVE & OBJECTIVE
Past Medical History:   Diagnosis Date    Anemia     Anxiety     Depressed     Diskitis     Hep C w/o coma, chronic     IV drug abuse     Kidney stone     Liver cirrhosis        Past Surgical History:   Procedure Laterality Date    ARTHROTOMY OF SHOULDER Left 11/15/2022    Procedure: ARTHROTOMY, SHOULDER;  Surgeon: Bjorn Hayes MD;  Location: [facility];  Service: Orthopedics;  Laterality: Left;  Left acromioclavicular joint arthrotomy    BACK SURGERY      benine tumor removal      forehead, age 9    CHOLECYSTECTOMY      KIDNEY SURGERY      LUMBAR FUSION Bilateral 3/2/2022    Procedure: FUSION, SPINE, LUMBAR;  Surgeon: Guille Templeton MD;  Location: [address];  Service: Neurosurgery;  Laterality: Bilateral;  [place]  T10--Pelvis    REMOVAL OF HARDWARE FROM SPINE N/A 2/21/2022    Procedure: REMOVAL, HARDWARE, SPINE;  Surgeon: Guille Templeton MD;  Location: [address];  Service: Neurosurgery;  Laterality: N/A;  [place]    SPINE SURGERY         Review of patient's allergies indicates:   Allergen Reactions    Bee venom protein (honey bee) Anaphylaxis     Patient reports she is allergic to bee stings.    Naproxen Anaphylaxis     Throat closing    Wasp sting [allergen ext-venom-honey bee] Anaphylaxis    Adhesive Blisters    Iodine and iodide containing products Hives     Allergic to iodine in seafood only    Shellfish containing products     Nuts [tree nut] Rash       No current facility-administered medications on file prior to encounter.     Current Outpatient Medications on File Prior to Encounter   Medication Sig    acetaminophen (TYLENOL) 325 MG tablet Take 2 tablets (650 mg total) by mouth every 6 (six) hours as needed for Temperature greater than (100.4 F).    albuterol (ACCUNEB) 1.25 mg/3 mL Nebu Take 3 mLs (1.25 mg total) by nebulization every 6 (six) hours as needed (shortness of breath). Rescue    amoxicillin (AMOXIL) 500 MG capsule Take 2 capsules (1,000 mg total) by mouth every 12 (twelve) hours.     [] buprenorphine-naloxone 8-2 (SUBOXONE) 8-2 mg Subl Place 1 tablet (8 mg total) under the tongue 2 (two) times a day.    buPROPion (WELLBUTRIN) 100 MG tablet Take 1 tablet (100 mg total) by mouth 2 (two) times daily.    ciprofloxacin HCl (CIPRO) 500 MG tablet Take 500 mg by mouth 2 (two) times daily.    folic acid (FOLVITE) 1 MG tablet Take 1 tablet (1 mg total) by mouth once daily. (Patient not taking: Reported on 2022)    pantoprazole (PROTONIX) 40 MG tablet Take 1 tablet (40 mg total) by mouth once daily.    pregabalin (LYRICA) 75 MG capsule Take 1 capsule (75 mg total) by mouth 3 (three) times daily.    sulfamethoxazole-trimethoprim 800-160mg (BACTRIM DS) 800-160 mg Tab Take 2 tablets by mouth 2 (two) times daily.    [DISCONTINUED] diclofenac sodium (VOLTAREN) 1 % Gel Apply 2 g topically 4 (four) times daily.     Family History       Problem Relation (Age of Onset)    Cirrhosis Father    Drug abuse Mother, Father    Hepatitis Mother, Father    Liver cancer Mother          Tobacco Use    Smoking status: Some Days     Packs/day: 0.50     Years: 23.00     Pack years: 11.50     Types: Cigarettes    Smokeless tobacco: Never    Tobacco comments:     patient states she knows she nees to quit.   Substance and Sexual Activity    Alcohol use: Not Currently     Comment: quit     Drug use: Yes     Frequency: 4.0 times per week     Types: Marijuana     Comment: Patient denies needle use, only inhalation of methampehtamines or orally.  Last known drug use was prior to admission to hospital stay for surgery    Sexual activity: Yes     Partners: Male     Birth control/protection: None     Review of Systems   Unable to perform ROS: Acuity of condition   Objective:     Vital Signs (Most Recent):  Temp: 100.3 °F (37.9 °C) (22)  Pulse: (!) 112 (22)  Resp: 16 (22)  BP: (!) 99/47 (22)  SpO2: 98 % (22)   Vital Signs (24h Range):  Temp:  [100.3 °F (37.9 °C)] 100.3  °F (37.9 °C)  Pulse:  [109-128] 112  Resp:  [11-38] 16  SpO2:  [97 %-99 %] 98 %  BP: ()/(36-61) 99/47     Weight: 97.5 kg (215 lb)  Body mass index is 33.67 kg/m².    Physical Exam  Vitals and nursing note reviewed.   Constitutional:       General: She is in acute distress.      Appearance: She is obese. She is ill-appearing.   HENT:      Head: Normocephalic and atraumatic.      Nose: Nose normal. No congestion or rhinorrhea.      Mouth/Throat:      Mouth: Mucous membranes are dry.      Pharynx: No oropharyngeal exudate.   Eyes:      General: No scleral icterus.  Neck:      Vascular: No carotid bruit.   Cardiovascular:      Rate and Rhythm: Regular rhythm. Tachycardia present.      Heart sounds: Murmur heard.     No friction rub. No gallop.   Pulmonary:      Effort: No respiratory distress.      Breath sounds: No stridor. No wheezing, rhonchi or rales.   Chest:      Chest wall: No tenderness.   Abdominal:      General: There is no distension.      Palpations: There is no mass.      Tenderness: There is no abdominal tenderness. There is no right CVA tenderness, left CVA tenderness, guarding or rebound.      Hernia: No hernia is present.   Musculoskeletal:         General: No swelling, tenderness or deformity.      Cervical back: Neck supple. No rigidity or tenderness.      Right lower leg: Edema present.      Left lower leg: Edema present.   Lymphadenopathy:      Cervical: No cervical adenopathy.   Skin:     Capillary Refill: Capillary refill takes less than 2 seconds.      Coloration: Skin is not jaundiced or pale.   Neurological:      Cranial Nerves: No cranial nerve deficit.      Sensory: Sensory deficit present.      Motor: Weakness present.   Psychiatric:      Comments: Drowsy, answers questions, moving all extremities.           Significant Labs: CBC:   Recent Labs   Lab 12/23/22 0258   WBC 7.73   HGB 8.7*   HCT 27.7*   PLT 81*     CMP:   Recent Labs   Lab 12/23/22 0258      K 3.4*      CO2  26   GLU 85   BUN 11   CREATININE 0.8   CALCIUM 7.5*   PROT 7.6   ALBUMIN 2.2*   BILITOT 1.8*   ALKPHOS 142*   AST 47*   ALT 19   ANIONGAP 5*     Urine Studies:   Recent Labs   Lab 12/23/22  0501   COLORU Yellow   APPEARANCEUA Clear   PHUR 6.0   SPECGRAV 1.025   PROTEINUA Trace*   GLUCUA Negative   KETONESU Negative   BILIRUBINUA 1+*   OCCULTUA Negative   NITRITE Negative   UROBILINOGEN 1.0   LEUKOCYTESUR 1+*   RBCUA 6*   WBCUA 13*   BACTERIA Negative   SQUAMEPITHEL 1   HYALINECASTS 2.3*       Significant Imaging: I have reviewed all pertinent imaging results/findings within the past 24 hours.

## 2022-12-23 NOTE — ED PROVIDER NOTES
"Encounter Date: 12/23/2022       History     Chief Complaint   Patient presents with    Fatigue     Pt presents to the ER via EMS w/ w/ complaints of weakness and lower back pain. Pt states she was ambulating at home prior to calling EMS, became weak and "let herself down to the ground." Upon arrival pt reports continued weakness, bilateral flank pain.      42-year-old female with a history of anemia, anxiety, recurrent cellulitis, hepatitis C, IVDU, liver cirrhosis, cholecystectomy, kidney stones, lumbar fusion, shoulder surgery, chronic back pain comes in c/o generalized weakness, fatigue, and bilateral flank pain.  She reports that this has been going on intermittently for several days and was diagnosed with a UTI recently.  No fever.  No abdominal pain or vomiting or diarrhea.     Review of patient's allergies indicates:   Allergen Reactions    Bee venom protein (honey bee) Anaphylaxis     Patient reports she is allergic to bee stings.    Wasp sting [allergen ext-venom-honey bee] Anaphylaxis    Adhesive Blisters    Iodine and iodide containing products Hives     Allergic to iodine in seafood only    Naproxen Other (See Comments)     Throat closing    Shellfish containing products     Nuts [tree nut] Rash     Past Medical History:   Diagnosis Date    Anemia     Anxiety     Depressed     Diskitis     Hep C w/o coma, chronic     IV drug abuse     Kidney stone     Liver cirrhosis      Past Surgical History:   Procedure Laterality Date    ARTHROTOMY OF SHOULDER Left 11/15/2022    Procedure: ARTHROTOMY, SHOULDER;  Surgeon: Bjorn Hayes MD;  Location: [facility];  Service: Orthopedics;  Laterality: Left;  Left acromioclavicular joint arthrotomy    BACK SURGERY      benine tumor removal      forehead, age 9    CHOLECYSTECTOMY      KIDNEY SURGERY      LUMBAR FUSION Bilateral 3/2/2022    Procedure: FUSION, SPINE, LUMBAR;  Surgeon: Guille Templeton MD;  Location: [address];  Service: Neurosurgery;  Laterality: " Bilateral;  AIRO  T10--Pelvis    REMOVAL OF HARDWARE FROM SPINE N/A 2/21/2022    Procedure: REMOVAL, HARDWARE, SPINE;  Surgeon: Guille Templeton MD;  Location: [facility] [address] [place] [address];  Service: Neurosurgery;  Laterality: N/A;  [place]    SPINE SURGERY       Family History   Problem Relation Age of Onset    Hepatitis Mother     Drug abuse Mother     Liver cancer Mother     Drug abuse Father     Cirrhosis Father     Hepatitis Father      Social History     Tobacco Use    Smoking status: Some Days     Packs/day: 0.50     Years: 23.00     Pack years: 11.50     Types: Cigarettes    Smokeless tobacco: Never    Tobacco comments:     patient states she knows she nees to quit.   Substance Use Topics    Alcohol use: Not Currently     Comment: quit 2014    Drug use: Yes     Frequency: 4.0 times per week     Types: Marijuana     Comment: Patient denies needle use, only inhalation of methampehtamines or orally.  Last known drug use was prior to admission to hospital stay for surgery     Review of Systems   Constitutional:  Positive for fever.   HENT:  Negative for sore throat.    Eyes:  Negative for visual disturbance.   Respiratory:  Negative for shortness of breath.    Cardiovascular:  Negative for chest pain.   Gastrointestinal:  Negative for abdominal pain, diarrhea, nausea and vomiting.   Genitourinary:  Positive for flank pain. Negative for dysuria.   Musculoskeletal:  Negative for neck pain and neck stiffness.   Skin:  Negative for rash.   Neurological:  Negative for syncope, facial asymmetry, speech difficulty, weakness and numbness.   Hematological:  Does not bruise/bleed easily.   Psychiatric/Behavioral:  Negative for confusion.    All other systems reviewed and are negative.    Physical Exam     Initial Vitals [12/23/22 0226]   BP Pulse Resp Temp SpO2   (!) 104/55 (!) 128 -- 100.3 °F (37.9 °C) 98 %      MAP       --         Physical Exam    Nursing note and vitals reviewed.  Constitutional: She appears well-developed and  well-nourished. She is not diaphoretic. No distress.   HENT:   Head: Normocephalic and atraumatic.   Dry mucous membranes   Eyes: EOM are normal. Pupils are equal, round, and reactive to light.   Neck: Neck supple.   Normal range of motion.  Cardiovascular:  Normal rate and regular rhythm.           Pulmonary/Chest: Breath sounds normal. She has no wheezes.   Abdominal: Abdomen is soft. Bowel sounds are normal. There is no abdominal tenderness.   Musculoskeletal:         General: Normal range of motion.      Cervical back: Normal range of motion and neck supple.     Neurological: She is alert and oriented to person, place, and time. She has normal strength. No cranial nerve deficit or sensory deficit.   Skin: Skin is warm and dry. Capillary refill takes less than 2 seconds.   LLE thigh redness   Psychiatric: She has a normal mood and affect. Thought content normal.       ED Course   Critical Care    Date/Time: 12/23/2022 4:10 AM  Performed by: Nataliya Cortés MD  Authorized by: Nataliya Cortés MD   Total critical care time (exclusive of procedural time) : 0 minutes  Critical care time was exclusive of separately billable procedures and treating other patients and teaching time.  Critical care was necessary to treat or prevent imminent or life-threatening deterioration of the following conditions: sepsis and metabolic crisis.  Critical care was time spent personally by me on the following activities: blood draw for specimens, development of treatment plan with patient or surrogate, discussions with primary provider, evaluation of patient's response to treatment, examination of patient, obtaining history from patient or surrogate, ordering and performing treatments and interventions, ordering and review of radiographic studies, ordering and review of laboratory studies, pulse oximetry, re-evaluation of patient's condition and review of old charts.      Labs Reviewed   CBC W/ AUTO DIFFERENTIAL - Abnormal; Notable for  the following components:       Result Value    RBC 3.11 (*)     Hemoglobin 8.7 (*)     Hematocrit 27.7 (*)     MCHC 31.4 (*)     RDW 17.1 (*)     Platelets 81 (*)     MPV 8.4 (*)     Lymph # 0.3 (*)     Gran % 90.2 (*)     Lymph % 3.2 (*)     All other components within normal limits   COMPREHENSIVE METABOLIC PANEL - Abnormal; Notable for the following components:    Potassium 3.4 (*)     Calcium 7.5 (*)     Albumin 2.2 (*)     Total Bilirubin 1.8 (*)     Alkaline Phosphatase 142 (*)     AST 47 (*)     Anion Gap 5 (*)     All other components within normal limits   MAGNESIUM - Abnormal; Notable for the following components:    Magnesium 1.5 (*)     All other components within normal limits   URINALYSIS, REFLEX TO URINE CULTURE - Abnormal; Notable for the following components:    Protein, UA Trace (*)     Bilirubin (UA) 1+ (*)     Leukocytes, UA 1+ (*)     All other components within normal limits    Narrative:     Preferred Collection Type->Urine, Clean Catch  Specimen Source->Urine   DRUG SCREEN PANEL, URINE EMERGENCY - Abnormal; Notable for the following components:    Methadone metabolites Presumptive Positive (*)     Amphetamine Screen, Ur Presumptive Positive (*)     THC Presumptive Positive (*)     All other components within normal limits    Narrative:     Specimen Source->Urine   LACTIC ACID, PLASMA - Abnormal; Notable for the following components:    Lactate (Lactic Acid) 3.6 (*)     All other components within normal limits    Narrative:     Lactic Acid critical result(s) called and verbal readback obtained   from Tramaine Jacobs RN in ER by ROSALBA 12/23/2022 03:51   URINALYSIS MICROSCOPIC - Abnormal; Notable for the following components:    RBC, UA 6 (*)     WBC, UA 13 (*)     Hyaline Casts, UA 2.3 (*)     All other components within normal limits    Narrative:     Preferred Collection Type->Urine, Clean Catch  Specimen Source->Urine   CULTURE, BLOOD   CULTURE, BLOOD   CULTURE, URINE   PREGNANCY TEST, URINE  RAPID    Narrative:     Specimen Source->Urine   LACTIC ACID, PLASMA   POCT INFLUENZA A/B MOLECULAR   SARS-COV-2 RDRP GENE     EKG Readings: (Independently Interpreted)   Initial Reading: No STEMI.   TWIs     Imaging Results              X-Ray Chest AP Portable (In process)                   X-Rays:   Independently Interpreted Readings:   Chest X-Ray: No acute abnormalities.   Medications   vancomycin (VANCOCIN) 2,000 mg in dextrose 5 % 250 mL IVPB (has no administration in time range)   sodium chloride 0.9% bolus 1,000 mL 1,000 mL (1,000 mLs Intravenous New Bag 12/23/22 0448)   NORepinephrine 4 mg in dextrose 5% 250 mL infusion (premix) (titrating) (0.05 mcg/kg/min × 97.5 kg Intravenous New Bag 12/23/22 0522)   0.9%  NaCl infusion (has no administration in time range)   orphenadrine injection 60 mg (60 mg Intramuscular Given 12/23/22 0249)   ibuprofen tablet 800 mg (800 mg Oral Given 12/23/22 0249)   sodium chloride 0.9% bolus 1,520 mL 1,520 mL (0 mLs Intravenous Stopped 12/23/22 0402)   potassium bicarbonate disintegrating tablet 20 mEq (20 mEq Oral Given 12/23/22 0333)   magnesium sulfate in dextrose IVPB (premix) 1 g (0 g Intravenous Stopped 12/23/22 0435)   piperacillin-tazobactam (ZOSYN) 4.5 g in dextrose 5 % in water (D5W) 5 % 100 mL IVPB (MB+) (4.5 g Intravenous New Bag 12/23/22 0445)                Attending Attestation:             Attending ED Notes:   42-year-old female with a history of anemia, recurrent cellulitis, anxiety, hepatitis C, IVDU, liver cirrhosis, cholecystectomy, kidney stones, lumbar fusion, shoulder surgery, chronic back pain comes in c/o generalized weakness, fatigue, and bilateral flank pain.  She reports that this has been going on intermittently for several days and was diagnosed with a UTI recently.  No fever.  No abdominal pain or vomiting or diarrhea.     Covid and Flu negative. Corrected calcium 8.9. CXR no obvious lobar infiltrate but does show right sided fullness, abnormal EKG  but no STEMI; no CP or SOB. Presentation consistent with dehydration, sedated due to multiple sedating medications (methadone, flexeril, lyrica, THC), anemia (at baseline), elevated lactic acid, low magnesium, low potassium, SIRS criteria, LLE cellulitis, UTI, sepsis. After being in the department for a little while, BP is lower than it was upon arrival to the ER. I spoke with Dr Barger about the patient and he agrees with plan to continue antibiotics and IVF, to start levophed peripherally, to consult PICC team, and to admit to ICU. 30cc/kg IVF, levophed, vanc, zosyn, mag, potassium, ibuprofen, norflex administered. Admit to [facility] for further evaluation and management. Pt agreed with POC. NAD, stable upon admission.                   Clinical Impression:   Final diagnoses:  [R00.0] Tachycardia  [A41.9] Sepsis, due to unspecified organism, unspecified whether acute organ dysfunction present (Primary)  [R10.9] Flank pain  [E87.6] Hypokalemia  [E83.42] Hypomagnesemia  [L03.116] Left leg cellulitis  [E86.0] Dehydration  [N39.0, R31.9] Urinary tract infection with hematuria, site unspecified        ED Disposition Condition    Admit Stable                Nataliya Cortés MD  12/23/22 9484       Nataliya Cortés MD  12/23/22 0517       Nataliya Cortés MD  12/23/22 5988

## 2022-12-23 NOTE — Clinical Note
Diagnosis: Sepsis, due to unspecified organism, unspecified whether acute organ dysfunction present [[id number]]   Admitting Provider:: DIANA ODEN JR [[id number]]   Future Attending Provider: DIANA ODEN JR [[id number]]   Reason for IP Medical Treatment  (Clinical interventions that can only be accomplished in the IP setting? ) :: sepsis   Estimated Length of Stay:: 2 midnights   I certify that Inpatient services for greater than or equal to 2 midnights are medically necessary:: Yes   Plans for Post-Acute care--if anticipated (pick the single best option):: A. No post acute care anticipated at this time

## 2022-12-23 NOTE — PROGRESS NOTES
Pharmacokinetic Initial Assessment: IV Vancomycin    Assessment/Plan:    Initiate intravenous vancomycin with loading dose of 2000 mg once followed by a maintenance dose of vancomycin 1750 mg IV every 12 hours  Desired empiric serum trough concentration is 15 to 20 mcg/mL  Draw vancomycin trough level 60 min prior to fourth dose on 12/24 at approximately 17:00  Pharmacy will continue to follow and monitor vancomycin.      Please contact pharmacy with any questions regarding this assessment.     Thank you for the consult,   Femi Contreras       Patient brief summary:  Rachel Zazueta is a 42 y.o. female initiated on antimicrobial therapy with IV Vancomycin for treatment of suspected bacteremia    Drug Allergies:   Review of patient's allergies indicates:   Allergen Reactions    Bee venom protein (honey bee) Anaphylaxis     Patient reports she is allergic to bee stings.    Wasp sting [allergen ext-venom-honey bee] Anaphylaxis    Adhesive Blisters    Iodine and iodide containing products Hives     Allergic to iodine in seafood only    Naproxen Other (See Comments)     Throat closing    Shellfish containing products     Nuts [tree nut] Rash       Actual Body Weight:   97.5kg    Renal Function:   Estimated Creatinine Clearance: 109.9 mL/min (based on SCr of 0.8 mg/dL).,     Dialysis Method (if applicable):  N/A    CBC (last 72 hours):  Recent Labs   Lab Result Units 12/23/22  0258   WBC K/uL 7.73   Hemoglobin g/dL 8.7*   Hematocrit % 27.7*   Platelets K/uL 81*   Gran % % 90.2*   Lymph % % 3.2*   Mono % % 5.2   Eosinophil % % 0.6   Basophil % % 0.3   Differential Method  Automated       Metabolic Panel (last 72 hours):  Recent Labs   Lab Result Units 12/23/22  0258 12/23/22  0501   Sodium mmol/L 136  --    Potassium mmol/L 3.4*  --    Chloride mmol/L 105  --    CO2 mmol/L 26  --    Glucose mg/dL 85  --    Glucose, UA   --  Negative   BUN mg/dL 11  --    Creatinine mg/dL 0.8  --    Creatinine, Urine mg/dL  --  154.0    Albumin g/dL 2.2*  --    Total Bilirubin mg/dL 1.8*  --    Alkaline Phosphatase U/L 142*  --    AST U/L 47*  --    ALT U/L 19  --    Magnesium mg/dL 1.5*  --        Drug levels (last 3 results):  No results for input(s): VANCOMYCINRA, VANCORANDOM, VANCOMYCINPE, VANCOPEAK, VANCOMYCINTR, VANCOTROUGH in the last 72 hours.    Microbiologic Results:  Microbiology Results (last 7 days)       Procedure Component Value Units Date/Time    Urine culture [[id number]] Collected: 12/23/22 0501    Order Status: No result Specimen: Urine Updated: 12/23/22 0514    Blood culture #1 **CANNOT BE ORDERED STAT** [[id number]] Collected: 12/23/22 0258    Order Status: Sent Specimen: Blood Updated: 12/23/22 0258    Blood culture #2 **CANNOT BE ORDERED STAT** [[id number]] Collected: 12/23/22 0257    Order Status: Sent Specimen: Blood Updated: 12/23/22 0258

## 2022-12-23 NOTE — PLAN OF CARE
[facility] - Intensive Care  Initial Discharge Assessment       Primary Care Provider: Dannielle Merino DO    Admission Diagnosis: Dehydration [E86.0]  Hypokalemia [E87.6]  Hypomagnesemia [E83.42]  Tachycardia [R00.0]  Flank pain [R10.9]  Left leg cellulitis [L03.116]  Urinary tract infection with hematuria, site unspecified [N39.0, R31.9]  Sepsis, due to unspecified organism, unspecified whether acute organ dysfunction present [A41.9]    Admission Date: 12/23/2022  Expected Discharge Date:     Discharge Barriers Identified: None (Patient receptive to otpt SA resources - printed and gave her the information for [facility].)    Payor: MEDICAID / Plan: [facility] / Product Type: Managed Medicaid /     Extended Emergency Contact Information  Primary Emergency Contact: Minor Reynoso  Address: [address] [place] [address]  Home Phone: [phone]  Mobile Phone: [phone]  Relation: Significant other  Secondary Emergency Contact: Meggan Llamas   [place]  Mobile Phone: [phone]  Relation: Friend    Discharge Plan A: Home with family  Discharge Plan B: Home      [facility] - [place], [place] - [address]  [address]  [facility] [address]  Phone: [phone] Fax: [phone]    [id number] [place], [place] - [address]  [address]  [place] [address]  Phone: [phone] Fax: [phone]      Initial Assessment (most recent)       Adult Discharge Assessment - 12/23/22 1156          Discharge Assessment    Assessment Type Discharge Planning Assessment     Confirmed/corrected address, phone number and insurance Yes     Confirmed Demographics Correct on Facesheet     Source of Information patient     Communicated SHIRA with patient/caregiver Date not available/Unable to determine     Reason For Admission sepsis     People in Home significant other     Do you  expect to return to your current living situation? Yes     Do you have help at home or someone to help you manage your care at home? Yes     Who are your caregiver(s) and their phone number(s)? CECI Gaxiola Jnoctavio - [phone]     Prior to hospitilization cognitive status: Alert/Oriented     Current cognitive status: Alert/Oriented     Walking or Climbing Stairs --   Independent    Dressing/Bathing --   Independent    Home Accessibility not wheelchair accessible;stairs to enter home     Number of Stairs, Main Entrance five     Surface of Stairs, Main Entrance concrete     Stair Railings, Main Entrance railings safe and in good condition     Home Layout Able to live on 1st floor     Equipment Currently Used at Home rollator     Patient currently being followed by outpatient case management? No     Do you currently have service(s) that help you manage your care at home? No     Do you take prescription medications? Yes     Do you have prescription coverage? Yes     Coverage [employer] Medicaid     Do you have any problems affording any of your prescribed medications? TBD     Who is going to help you get home at discharge? SO Nohemi Gaxiola     How do you get to doctors appointments? family or friend will provide     Are you on dialysis? No     Discharge Plan A Home with family     Discharge Plan B Home     DME Needed Upon Discharge  none     Discharge Plan discussed with: Patient     Discharge Barriers Identified None   Patient receptive to otpt SA resources - printed and gave her the information for [facility].

## 2022-12-24 PROBLEM — I27.20 PULMONARY HYPERTENSION: Status: ACTIVE | Noted: 2022-12-24

## 2022-12-24 PROBLEM — R09.89 PULMONARY HYPERINFLATION: Status: RESOLVED | Noted: 2022-12-24 | Resolved: 2022-12-24

## 2022-12-24 PROBLEM — R09.89 PULMONARY HYPERINFLATION: Status: ACTIVE | Noted: 2022-12-24

## 2022-12-24 LAB
ALBUMIN SERPL BCP-MCNC: 1.7 G/DL (ref 3.5–5.2)
ALP SERPL-CCNC: 111 U/L (ref 55–135)
ALT SERPL W/O P-5'-P-CCNC: 14 U/L (ref 10–44)
ANION GAP SERPL CALC-SCNC: 3 MMOL/L (ref 8–16)
AST SERPL-CCNC: 29 U/L (ref 10–40)
BASOPHILS # BLD AUTO: ABNORMAL K/UL (ref 0–0.2)
BASOPHILS NFR BLD: 0 % (ref 0–1.9)
BILIRUB SERPL-MCNC: 1.4 MG/DL (ref 0.1–1)
BUN SERPL-MCNC: 10 MG/DL (ref 6–20)
CALCIUM SERPL-MCNC: 7.3 MG/DL (ref 8.7–10.5)
CHLORIDE SERPL-SCNC: 110 MMOL/L (ref 95–110)
CO2 SERPL-SCNC: 27 MMOL/L (ref 23–29)
CREAT SERPL-MCNC: 0.4 MG/DL (ref 0.5–1.4)
DIFFERENTIAL METHOD: ABNORMAL
EOSINOPHIL # BLD AUTO: ABNORMAL K/UL (ref 0–0.5)
EOSINOPHIL NFR BLD: 3 % (ref 0–8)
ERYTHROCYTE [DISTWIDTH] IN BLOOD BY AUTOMATED COUNT: 16.6 % (ref 11.5–14.5)
EST. GFR  (NO RACE VARIABLE): >60 ML/MIN/1.73 M^2
GLUCOSE SERPL-MCNC: 95 MG/DL (ref 70–110)
HCT VFR BLD AUTO: 23.7 % (ref 37–48.5)
HGB BLD-MCNC: 7.3 G/DL (ref 12–16)
IMM GRANULOCYTES # BLD AUTO: ABNORMAL K/UL (ref 0–0.04)
IMM GRANULOCYTES NFR BLD AUTO: ABNORMAL % (ref 0–0.5)
LYMPHOCYTES # BLD AUTO: ABNORMAL K/UL (ref 1–4.8)
LYMPHOCYTES NFR BLD: 11 % (ref 18–48)
MAGNESIUM SERPL-MCNC: 1.8 MG/DL (ref 1.6–2.6)
MCH RBC QN AUTO: 27.4 PG (ref 27–31)
MCHC RBC AUTO-ENTMCNC: 30.8 G/DL (ref 32–36)
MCV RBC AUTO: 89 FL (ref 82–98)
MONOCYTES # BLD AUTO: ABNORMAL K/UL (ref 0.3–1)
MONOCYTES NFR BLD: 8 % (ref 4–15)
NEUTROPHILS NFR BLD: 77 % (ref 38–73)
NEUTS BAND NFR BLD MANUAL: 1 %
NRBC BLD-RTO: 0 /100 WBC
PLATELET # BLD AUTO: 47 K/UL (ref 150–450)
PLATELET BLD QL SMEAR: ABNORMAL
PMV BLD AUTO: 9.5 FL (ref 9.2–12.9)
POTASSIUM SERPL-SCNC: 3.4 MMOL/L (ref 3.5–5.1)
PROT SERPL-MCNC: 6.2 G/DL (ref 6–8.4)
RBC # BLD AUTO: 2.66 M/UL (ref 4–5.4)
SODIUM SERPL-SCNC: 140 MMOL/L (ref 136–145)
VANCOMYCIN TROUGH SERPL-MCNC: 15.7 UG/ML (ref 10–22)
WBC # BLD AUTO: 2.21 K/UL (ref 3.9–12.7)

## 2022-12-24 PROCEDURE — 25000003 PHARM REV CODE 250: Performed by: INTERNAL MEDICINE

## 2022-12-24 PROCEDURE — 83735 ASSAY OF MAGNESIUM: CPT | Performed by: INTERNAL MEDICINE

## 2022-12-24 PROCEDURE — 63600175 PHARM REV CODE 636 W HCPCS: Performed by: INTERNAL MEDICINE

## 2022-12-24 PROCEDURE — 36415 COLL VENOUS BLD VENIPUNCTURE: CPT | Performed by: INTERNAL MEDICINE

## 2022-12-24 PROCEDURE — 85007 BL SMEAR W/DIFF WBC COUNT: CPT | Performed by: EMERGENCY MEDICINE

## 2022-12-24 PROCEDURE — 20000000 HC ICU ROOM

## 2022-12-24 PROCEDURE — 80202 ASSAY OF VANCOMYCIN: CPT | Performed by: INTERNAL MEDICINE

## 2022-12-24 PROCEDURE — 25000003 PHARM REV CODE 250: Performed by: EMERGENCY MEDICINE

## 2022-12-24 PROCEDURE — 85027 COMPLETE CBC AUTOMATED: CPT | Performed by: EMERGENCY MEDICINE

## 2022-12-24 PROCEDURE — 63600175 PHARM REV CODE 636 W HCPCS: Performed by: EMERGENCY MEDICINE

## 2022-12-24 PROCEDURE — A4216 STERILE WATER/SALINE, 10 ML: HCPCS | Performed by: INTERNAL MEDICINE

## 2022-12-24 PROCEDURE — 80053 COMPREHEN METABOLIC PANEL: CPT | Performed by: EMERGENCY MEDICINE

## 2022-12-24 PROCEDURE — 36415 COLL VENOUS BLD VENIPUNCTURE: CPT | Performed by: EMERGENCY MEDICINE

## 2022-12-24 RX ORDER — METHOCARBAMOL 500 MG/1
500 TABLET, FILM COATED ORAL 4 TIMES DAILY
Status: DISCONTINUED | OUTPATIENT
Start: 2022-12-24 | End: 2023-01-13

## 2022-12-24 RX ORDER — DEXMEDETOMIDINE HYDROCHLORIDE 4 UG/ML
0-1.4 INJECTION, SOLUTION INTRAVENOUS CONTINUOUS
Status: DISCONTINUED | OUTPATIENT
Start: 2022-12-25 | End: 2022-12-26

## 2022-12-24 RX ORDER — HYDROCODONE BITARTRATE AND ACETAMINOPHEN 5; 325 MG/1; MG/1
1 TABLET ORAL EVERY 6 HOURS PRN
Status: DISCONTINUED | OUTPATIENT
Start: 2022-12-24 | End: 2023-01-16

## 2022-12-24 RX ORDER — MORPHINE SULFATE 2 MG/ML
2 INJECTION, SOLUTION INTRAMUSCULAR; INTRAVENOUS EVERY 4 HOURS PRN
Status: DISCONTINUED | OUTPATIENT
Start: 2022-12-24 | End: 2023-01-04

## 2022-12-24 RX ORDER — METHOCARBAMOL 100 MG/ML
1000 INJECTION, SOLUTION INTRAMUSCULAR; INTRAVENOUS ONCE
Status: COMPLETED | OUTPATIENT
Start: 2022-12-24 | End: 2022-12-24

## 2022-12-24 RX ORDER — HYDROMORPHONE HYDROCHLORIDE 1 MG/ML
1 INJECTION, SOLUTION INTRAMUSCULAR; INTRAVENOUS; SUBCUTANEOUS ONCE
Status: COMPLETED | OUTPATIENT
Start: 2022-12-25 | End: 2022-12-25

## 2022-12-24 RX ADMIN — IBUPROFEN 400 MG: 400 TABLET, FILM COATED ORAL at 01:12

## 2022-12-24 RX ADMIN — MUPIROCIN: 20 OINTMENT TOPICAL at 09:12

## 2022-12-24 RX ADMIN — Medication 1750 MG: at 05:12

## 2022-12-24 RX ADMIN — Medication 10 ML: at 11:12

## 2022-12-24 RX ADMIN — METHOCARBAMOL TABLETS 500 MG: 500 TABLET, COATED ORAL at 01:12

## 2022-12-24 RX ADMIN — METHOCARBAMOL TABLETS 500 MG: 500 TABLET, COATED ORAL at 05:12

## 2022-12-24 RX ADMIN — METHOCARBAMOL 1000 MG: 100 INJECTION INTRAMUSCULAR; INTRAVENOUS at 10:12

## 2022-12-24 RX ADMIN — PANTOPRAZOLE SODIUM 40 MG: 40 TABLET, DELAYED RELEASE ORAL at 09:12

## 2022-12-24 RX ADMIN — Medication 10 ML: at 05:12

## 2022-12-24 RX ADMIN — DEXMEDETOMIDINE HYDROCHLORIDE 0.2 MCG/KG/HR: 4 INJECTION, SOLUTION INTRAVENOUS at 10:12

## 2022-12-24 RX ADMIN — Medication 1750 MG: at 06:12

## 2022-12-24 RX ADMIN — Medication 10 ML: at 06:12

## 2022-12-24 RX ADMIN — METHOCARBAMOL TABLETS 500 MG: 500 TABLET, COATED ORAL at 08:12

## 2022-12-24 RX ADMIN — PREGABALIN 75 MG: 75 CAPSULE ORAL at 08:12

## 2022-12-24 RX ADMIN — PREGABALIN 75 MG: 75 CAPSULE ORAL at 09:12

## 2022-12-24 RX ADMIN — PIPERACILLIN AND TAZOBACTAM 4.5 G: 4; .5 INJECTION, POWDER, LYOPHILIZED, FOR SOLUTION INTRAVENOUS; PARENTERAL at 08:12

## 2022-12-24 RX ADMIN — Medication 10 ML: at 12:12

## 2022-12-24 RX ADMIN — PIPERACILLIN AND TAZOBACTAM 4.5 G: 4; .5 INJECTION, POWDER, LYOPHILIZED, FOR SOLUTION INTRAVENOUS; PARENTERAL at 01:12

## 2022-12-24 RX ADMIN — IBUPROFEN 400 MG: 400 TABLET, FILM COATED ORAL at 09:12

## 2022-12-24 RX ADMIN — PIPERACILLIN AND TAZOBACTAM 4.5 G: 4; .5 INJECTION, POWDER, LYOPHILIZED, FOR SOLUTION INTRAVENOUS; PARENTERAL at 04:12

## 2022-12-24 RX ADMIN — MUPIROCIN: 20 OINTMENT TOPICAL at 08:12

## 2022-12-24 NOTE — SUBJECTIVE & OBJECTIVE
Past Medical History:   Diagnosis Date    Anemia     Anxiety     Depressed     Diskitis     Hep C w/o coma, chronic     IV drug abuse     Kidney stone     Liver cirrhosis        Past Surgical History:   Procedure Laterality Date    ARTHROTOMY OF SHOULDER Left 11/15/2022    Procedure: ARTHROTOMY, SHOULDER;  Surgeon: Bjorn Hayes MD;  Location: [facility];  Service: Orthopedics;  Laterality: Left;  Left acromioclavicular joint arthrotomy    BACK SURGERY      benine tumor removal      forehead, age 9    CHOLECYSTECTOMY      KIDNEY SURGERY      LUMBAR FUSION Bilateral 3/2/2022    Procedure: FUSION, SPINE, LUMBAR;  Surgeon: Guille Templeton MD;  Location: [address];  Service: Neurosurgery;  Laterality: Bilateral;  AIRO  T10--Pelvis    REMOVAL OF HARDWARE FROM SPINE N/A 2/21/2022    Procedure: REMOVAL, HARDWARE, SPINE;  Surgeon: Guille Templeton MD;  Location: [address];  Service: Neurosurgery;  Laterality: N/A;  [place]    SPINE SURGERY         Review of patient's allergies indicates:   Allergen Reactions    Bee venom protein (honey bee) Anaphylaxis     Patient reports she is allergic to bee stings.    Naproxen Anaphylaxis     Throat closing    12-23- Patient reports taking Ibuprofen 200 mg at home without problems. Verified X3. [place]    Wasp sting [allergen ext-venom-honey bee] Anaphylaxis    Adhesive Blisters    Iodine and iodide containing products Hives     Allergic to iodine in seafood only    Shellfish containing products     Nuts [tree nut] Rash       No current facility-administered medications on file prior to encounter.     Current Outpatient Medications on File Prior to Encounter   Medication Sig    acetaminophen (TYLENOL) 325 MG tablet Take 2 tablets (650 mg total) by mouth every 6 (six) hours as needed for Temperature greater than (100.4 F).    albuterol (ACCUNEB) 1.25 mg/3 mL Nebu Take 3 mLs (1.25 mg total) by nebulization every 6 (six) hours as needed (shortness of breath). Rescue    amoxicillin  (AMOXIL) 500 MG capsule Take 2 capsules (1,000 mg total) by mouth every 12 (twelve) hours.    buPROPion (WELLBUTRIN) 100 MG tablet Take 1 tablet (100 mg total) by mouth 2 (two) times daily.    ciprofloxacin HCl (CIPRO) 500 MG tablet Take 500 mg by mouth 2 (two) times daily.    folic acid (FOLVITE) 1 MG tablet Take 1 tablet (1 mg total) by mouth once daily. (Patient not taking: Reported on 11/25/2022)    pantoprazole (PROTONIX) 40 MG tablet Take 1 tablet (40 mg total) by mouth once daily.    pregabalin (LYRICA) 75 MG capsule Take 1 capsule (75 mg total) by mouth 3 (three) times daily.    sulfamethoxazole-trimethoprim 800-160mg (BACTRIM DS) 800-160 mg Tab Take 2 tablets by mouth 2 (two) times daily.    [DISCONTINUED] diclofenac sodium (VOLTAREN) 1 % Gel Apply 2 g topically 4 (four) times daily.     Family History       Problem Relation (Age of Onset)    Cirrhosis Father    Drug abuse Mother, Father    Hepatitis Mother, Father    Liver cancer Mother          Tobacco Use    Smoking status: Some Days     Packs/day: 0.50     Years: 23.00     Pack years: 11.50     Types: Cigarettes    Smokeless tobacco: Never    Tobacco comments:     patient states she knows she nees to quit.   Substance and Sexual Activity    Alcohol use: Not Currently     Comment: quit 2014    Drug use: Yes     Frequency: 4.0 times per week     Types: Marijuana     Comment: Patient denies needle use, only inhalation of methampehtamines or orally.  Last known drug use was prior to admission to hospital stay for surgery    Sexual activity: Yes     Partners: Male     Birth control/protection: None     Review of Systems   Unable to perform ROS: Acuity of condition   Objective:     Vital Signs (Most Recent):  Temp: 97.5 °F (36.4 °C) (12/24/22 1520)  Pulse: 98 (12/24/22 1600)  Resp: 12 (12/24/22 1600)  BP: 108/60 (12/24/22 1600)  SpO2: 99 % (12/24/22 1600)   Vital Signs (24h Range):  Temp:  [97.1 °F (36.2 °C)-98.4 °F (36.9 °C)] 97.5 °F (36.4 °C)  Pulse:   [] 98  Resp:  [10-25] 12  SpO2:  [94 %-100 %] 99 %  BP: ()/(50-75) 108/60     Weight: 97.5 kg (215 lb)  Body mass index is 33.67 kg/m².    Physical Exam  Vitals and nursing note reviewed.   Constitutional:       General: She is in acute distress.      Appearance: She is obese. She is ill-appearing.   HENT:      Head: Normocephalic and atraumatic.      Nose: Nose normal. No congestion or rhinorrhea.      Mouth/Throat:      Mouth: Mucous membranes are dry.      Pharynx: No oropharyngeal exudate.   Eyes:      General: No scleral icterus.  Neck:      Vascular: No carotid bruit.   Cardiovascular:      Rate and Rhythm: Regular rhythm. Tachycardia present.      Heart sounds: Murmur heard.     No friction rub. No gallop.   Pulmonary:      Effort: No respiratory distress.      Breath sounds: No stridor. No wheezing, rhonchi or rales.   Chest:      Chest wall: No tenderness.   Abdominal:      General: There is no distension.      Palpations: There is no mass.      Tenderness: There is no abdominal tenderness. There is no right CVA tenderness, left CVA tenderness, guarding or rebound.      Hernia: No hernia is present.   Musculoskeletal:         General: No swelling, tenderness or deformity.      Cervical back: Neck supple. No rigidity or tenderness.      Right lower leg: Edema present.      Left lower leg: Edema present.   Lymphadenopathy:      Cervical: No cervical adenopathy.   Skin:     Capillary Refill: Capillary refill takes less than 2 seconds.      Coloration: Skin is not jaundiced or pale.   Neurological:      Cranial Nerves: No cranial nerve deficit.      Sensory: Sensory deficit present.      Motor: Weakness present.   Psychiatric:      Comments: Drowsy, answers questions, moving all extremities.           Significant Labs: CBC:   Recent Labs   Lab 12/23/22 0258 12/24/22 0339   WBC 7.73 2.21*   HGB 8.7* 7.3*   HCT 27.7* 23.7*   PLT 81* 47*       CMP:   Recent Labs   Lab 12/23/22 0258 12/24/22 0339     140   K 3.4* 3.4*    110   CO2 26 27   GLU 85 95   BUN 11 10   CREATININE 0.8 0.4*   CALCIUM 7.5* 7.3*   PROT 7.6 6.2   ALBUMIN 2.2* 1.7*   BILITOT 1.8* 1.4*   ALKPHOS 142* 111   AST 47* 29   ALT 19 14   ANIONGAP 5* 3*       Urine Studies:   Recent Labs   Lab 12/23/22  0501   COLORU Yellow   APPEARANCEUA Clear   PHUR 6.0   SPECGRAV 1.025   PROTEINUA Trace*   GLUCUA Negative   KETONESU Negative   BILIRUBINUA 1+*   OCCULTUA Negative   NITRITE Negative   UROBILINOGEN 1.0   LEUKOCYTESUR 1+*   RBCUA 6*   WBCUA 13*   BACTERIA Negative   SQUAMEPITHEL 1   HYALINECASTS 2.3*         Significant Imaging: I have reviewed all pertinent imaging results/findings within the past 24 hours.

## 2022-12-24 NOTE — PROGRESS NOTES
[facility] Medicine  Progress Note    Patient Name: Rachel Zazueta  MRN: [id number]  Patient Class: IP- Inpatient   Admission Date: 12/23/2022  Length of Stay: 1 days  Attending Physician: Hal Barger Jr., MD  Primary Care Provider: Dannielle Merino DO        Subjective:     Principal Problem:Severe sepsis        HPI:  ER HPI:  42-year-old female with a history of anemia, anxiety, recurrent cellulitis, hepatitis C, IVDU, liver cirrhosis, cholecystectomy, kidney stones, lumbar fusion, shoulder surgery, chronic back pain comes in c/o generalized weakness, fatigue, and bilateral flank pain.  She reports that this has been going on intermittently for several days and was diagnosed with a UTI recently.  No fever.  No abdominal pain or vomiting or diarrhea.     IM HPI:  Patient is well known to our service.  Patient has a history of IV drug abuse and unfortunately has relapsed.  Patient has a history of a epidural abscess requiring a large lumbar surgery with multiple rehabilitative stays and therapy.  The patient was progressing to ambulation and recently has declined.  Patient admits to starting drugs including IV drug use again.  Patient presented to the ED with the above symptoms and she has a noticeable cardiac murmur today that we are getting a stat echocardiogram.  Patient's been started on antibiotics fluid resuscitated and seems to have sepsis.  Patient's urinary studies were abnormal but not overwhelming.      Overview/Hospital Course:  12/24/22 CG: Patient remains in the ICU today. Has a lot of muscle spasms and low back pain. Echocardiogram ordered yesterday and completed; significant for pulm HTN but without vegetations.       Past Medical History:   Diagnosis Date    Anemia     Anxiety     Depressed     Diskitis     Hep C w/o coma, chronic     IV drug abuse     Kidney stone     Liver cirrhosis        Past Surgical History:   Procedure Laterality Date    ARTHROTOMY  OF SHOULDER Left 11/15/2022    Procedure: ARTHROTOMY, SHOULDER;  Surgeon: Bjorn Hayes MD;  Location: [facility];  Service: Orthopedics;  Laterality: Left;  Left acromioclavicular joint arthrotomy    BACK SURGERY      benine tumor removal      forehead, age 9    CHOLECYSTECTOMY      KIDNEY SURGERY      LUMBAR FUSION Bilateral 3/2/2022    Procedure: FUSION, SPINE, LUMBAR;  Surgeon: Guille Templeton MD;  [facility];  Service: Neurosurgery;  Laterality: Bilateral;  AIRO  T10--Pelvis    REMOVAL OF HARDWARE FROM SPINE N/A 2/21/2022    Procedure: REMOVAL, HARDWARE, SPINE;  Surgeon: Guille Templeton MD;  Location[facility];  Service: Neurosurgery;  Laterality: N/A;  [place]    SPINE SURGERY         Review of patient's allergies indicates:   Allergen Reactions    Bee venom protein (honey bee) Anaphylaxis     Patient reports she is allergic to bee stings.    Naproxen Anaphylaxis     Throat closing    12-23- Patient reports taking Ibuprofen 200 mg at home without problems. Verified X3. [place]    Wasp sting [allergen ext-venom-honey bee] Anaphylaxis    Adhesive Blisters    Iodine and iodide containing products Hives     Allergic to iodine in seafood only    Shellfish containing products     Nuts [tree nut] Rash       No current facility-administered medications on file prior to encounter.     Current Outpatient Medications on File Prior to Encounter   Medication Sig    acetaminophen (TYLENOL) 325 MG tablet Take 2 tablets (650 mg total) by mouth every 6 (six) hours as needed for Temperature greater than (100.4 F).    albuterol (ACCUNEB) 1.25 mg/3 mL Nebu Take 3 mLs (1.25 mg total) by nebulization every 6 (six) hours as needed (shortness of breath). Rescue    amoxicillin (AMOXIL) 500 MG capsule Take 2 capsules (1,000 mg total) by mouth every 12 (twelve) hours.    buPROPion (WELLBUTRIN) 100 MG tablet Take 1 tablet (100 mg total) by mouth 2 (two) times daily.    ciprofloxacin HCl (CIPRO) 500 MG  tablet Take 500 mg by mouth 2 (two) times daily.    folic acid (FOLVITE) 1 MG tablet Take 1 tablet (1 mg total) by mouth once daily. (Patient not taking: Reported on 11/25/2022)    pantoprazole (PROTONIX) 40 MG tablet Take 1 tablet (40 mg total) by mouth once daily.    pregabalin (LYRICA) 75 MG capsule Take 1 capsule (75 mg total) by mouth 3 (three) times daily.    sulfamethoxazole-trimethoprim 800-160mg (BACTRIM DS) 800-160 mg Tab Take 2 tablets by mouth 2 (two) times daily.    [DISCONTINUED] diclofenac sodium (VOLTAREN) 1 % Gel Apply 2 g topically 4 (four) times daily.     Family History       Problem Relation (Age of Onset)    Cirrhosis Father    Drug abuse Mother, Father    Hepatitis Mother, Father    Liver cancer Mother          Tobacco Use    Smoking status: Some Days     Packs/day: 0.50     Years: 23.00     Pack years: 11.50     Types: Cigarettes    Smokeless tobacco: Never    Tobacco comments:     patient states she knows she nees to quit.   Substance and Sexual Activity    Alcohol use: Not Currently     Comment: quit 2014    Drug use: Yes     Frequency: 4.0 times per week     Types: Marijuana     Comment: Patient denies needle use, only inhalation of methampehtamines or orally.  Last known drug use was prior to admission to hospital stay for surgery    Sexual activity: Yes     Partners: Male     Birth control/protection: None     Review of Systems   Unable to perform ROS: Acuity of condition   Objective:     Vital Signs (Most Recent):  Temp: 97.5 °F (36.4 °C) (12/24/22 1520)  Pulse: 98 (12/24/22 1600)  Resp: 12 (12/24/22 1600)  BP: 108/60 (12/24/22 1600)  SpO2: 99 % (12/24/22 1600)   Vital Signs (24h Range):  Temp:  [97.1 °F (36.2 °C)-98.4 °F (36.9 °C)] 97.5 °F (36.4 °C)  Pulse:  [] 98  Resp:  [10-25] 12  SpO2:  [94 %-100 %] 99 %  BP: ()/(50-75) 108/60     Weight: 97.5 kg (215 lb)  Body mass index is 33.67 kg/m².    Physical Exam  Vitals and nursing note reviewed.   Constitutional:        General: She is in acute distress.      Appearance: She is obese. She is ill-appearing.   HENT:      Head: Normocephalic and atraumatic.      Nose: Nose normal. No congestion or rhinorrhea.      Mouth/Throat:      Mouth: Mucous membranes are dry.      Pharynx: No oropharyngeal exudate.   Eyes:      General: No scleral icterus.  Neck:      Vascular: No carotid bruit.   Cardiovascular:      Rate and Rhythm: Regular rhythm. Tachycardia present.      Heart sounds: Murmur heard.     No friction rub. No gallop.   Pulmonary:      Effort: No respiratory distress.      Breath sounds: No stridor. No wheezing, rhonchi or rales.   Chest:      Chest wall: No tenderness.   Abdominal:      General: There is no distension.      Palpations: There is no mass.      Tenderness: There is no abdominal tenderness. There is no right CVA tenderness, left CVA tenderness, guarding or rebound.      Hernia: No hernia is present.   Musculoskeletal:         General: No swelling, tenderness or deformity.      Cervical back: Neck supple. No rigidity or tenderness.      Right lower leg: Edema present.      Left lower leg: Edema present.   Lymphadenopathy:      Cervical: No cervical adenopathy.   Skin:     Capillary Refill: Capillary refill takes less than 2 seconds.      Coloration: Skin is not jaundiced or pale.   Neurological:      Cranial Nerves: No cranial nerve deficit.      Sensory: Sensory deficit present.      Motor: Weakness present.   Psychiatric:      Comments: Drowsy, answers questions, moving all extremities.           Significant Labs: CBC:   Recent Labs   Lab 12/23/22  0258 12/24/22  0339   WBC 7.73 2.21*   HGB 8.7* 7.3*   HCT 27.7* 23.7*   PLT 81* 47*       CMP:   Recent Labs   Lab 12/23/22  0258 12/24/22  0339    140   K 3.4* 3.4*    110   CO2 26 27   GLU 85 95   BUN 11 10   CREATININE 0.8 0.4*   CALCIUM 7.5* 7.3*   PROT 7.6 6.2   ALBUMIN 2.2* 1.7*   BILITOT 1.8* 1.4*   ALKPHOS 142* 111   AST 47* 29   ALT 19 14    ANIONGAP 5* 3*       Urine Studies:   Recent Labs   Lab 12/23/22  0501   COLORU Yellow   APPEARANCEUA Clear   PHUR 6.0   SPECGRAV 1.025   PROTEINUA Trace*   GLUCUA Negative   KETONESU Negative   BILIRUBINUA 1+*   OCCULTUA Negative   NITRITE Negative   UROBILINOGEN 1.0   LEUKOCYTESUR 1+*   RBCUA 6*   WBCUA 13*   BACTERIA Negative   SQUAMEPITHEL 1   HYALINECASTS 2.3*         Significant Imaging: I have reviewed all pertinent imaging results/findings within the past 24 hours.      Assessment/Plan:      * Severe sepsis  This patient does have evidence of infective focus  My overall impression is sepsis. Vital signs were reviewed and noted in progress note.  Antibiotics given-   Antibiotics (From admission, onward)    Start     Stop Route Frequency Ordered    12/23/22 1800  vancomycin 1750 mg in 0.9% sodium chloride 500 mL IVPB         -- IV Every 12 hours (non-standard times) 12/23/22 0653    12/23/22 1245  piperacillin-tazobactam (ZOSYN) 4.5 g in dextrose 5 % in water (D5W) 5 % 100 mL IVPB (MB+)         -- IV Every 8 hours (non-standard times) 12/23/22 0647    12/23/22 0945  mupirocin 2 % ointment         12/28 0859 Nasl 2 times daily 12/23/22 0839    12/23/22 0647  vancomycin - pharmacy to dose  (vancomycin IVPB)        See Hyperspace for full Linked Orders Report.    -- IV pharmacy to manage frequency 12/23/22 0647        Cultures were taken-   Microbiology Results (last 7 days)     Procedure Component Value Units Date/Time    Urine culture [[id number]] Collected: 12/23/22 0501    Order Status: No result Specimen: Urine Updated: 12/23/22 0514    Blood culture #1 **CANNOT BE ORDERED STAT** [[id number]] Collected: 12/23/22 0258    Order Status: Sent Specimen: Blood Updated: 12/23/22 0258    Blood culture #2 **CANNOT BE ORDERED STAT** [[id number]] Collected: 12/23/22 0257    Order Status: Sent Specimen: Blood Updated: 12/23/22 0258        Latest lactate reviewed, they are-  Recent Labs   Lab 12/23/22 0258 12/23/22 0701    LACTATE 3.6* 1.8       Organ dysfunction indicated by Acute kidney injury, Encephalopathy  and Acute liver injury  Source- ?    Source control Achieved by- see orders      Pulmonary hypertension  Found incidentally on echocardiogram with evidence of elevated right sided pressures and dilated heart chambers on the right side. Unclear the exact etiology or class of Pulm HTN but given her significant substance abuse Class 1 certainly is a possibility.   - Will refer to outpatient pulmonologist.   - Will consider a cariology consult while inpatient to see if they think it would be beneficial to have a heart cath done while inpatient      Murmur, cardiac  No evidence of vegetations    Mild episode of recurrent major depressive disorder    Patient with altered mental status holding antidepressants for now      Cannabis use disorder, moderate, dependence  As above      Amphetamine use disorder, severe  Continue to  educate and support      Spinal stenosis at L4-L5 level        Substance use disorder    See above    Chronic hepatitis C with cirrhosis  Labs noted      Cirrhosis of liver without ascites  Labs noted      UTI (urinary tract infection)  On abx, ? Source.        VTE Risk Mitigation (From admission, onward)         Ordered     IP VTE HIGH RISK PATIENT  Once         12/23/22 0647     Place sequential compression device  Until discontinued         12/23/22 0647                Discharge Planning   SHIRA:      Code Status: Full Code   Is the patient medically ready for discharge?:     Reason for patient still in hospital (select all that apply): Treatment  Discharge Plan A: Home with family                  Eleazar Livingston DO  Department of Hospital Medicine   [place] - Intensive [facility], [place]

## 2022-12-24 NOTE — EICU
Intervention Initiated From:  COR / KARLA Nguyen intervened regarding:  Rounding (Video assessment)      Comments:  pt resting quietly in bed with eyes closed. NAD observed.  VSS.

## 2022-12-24 NOTE — PLAN OF CARE
Drowsy today. Wakes up with stimulation. Able to answer questions correctly, Follows simple commands without difficulty. Cont with IVF. Levophed dc'd. PICC placed today. Jones cath draining without difficulty. Ibuprofen given for back pain. Tolerated without adverse reaction.

## 2022-12-24 NOTE — ASSESSMENT & PLAN NOTE
Found incidentally on echocardiogram with evidence of elevated right sided pressures and dilated heart chambers on the right side. Unclear the exact etiology or class of Pulm HTN but given her significant substance abuse Class 1 certainly is a possibility.   - Will refer to outpatient pulmonologist.   - Will consider a cariology consult while inpatient to see if they think it would be beneficial to have a heart cath done while inpatient

## 2022-12-24 NOTE — PROGRESS NOTES
Pharmacokinetic Assessment Follow Up: IV Vancomycin    Vancomycin serum concentration assessment(s):    The trough level was drawn correctly and can be used to guide therapy at this time. The measurement is within the desired definitive target range of 15 to 20 mcg/mL.    Vancomycin Regimen Plan:    Continue regimen to Vancomycin 1750 mg IV every 12 hours with next serum trough concentration measured at 17:00 prior to 4th dose on 12/26/22 or sooner if clinically indicated.    Drug levels (last 3 results):  Recent Labs   Lab Result Units 12/24/22  1648   Vancomycin-Trough ug/mL 15.7       Pharmacy will continue to follow and monitor vancomycin.    Please contact pharmacy for questions regarding this assessment.    Thank you for the consult,   Ryan Hollis       Patient brief summary:  Rachel Zazueta is a 42 y.o. female initiated on antimicrobial therapy with IV Vancomycin for treatment of bacteremia    The patient's current regimen is Vancomycin 1750 mg IV every 12 hours    Drug Allergies:   Review of patient's allergies indicates:   Allergen Reactions    Bee venom protein (honey bee) Anaphylaxis     Patient reports she is allergic to bee stings.    Naproxen Anaphylaxis     Throat closing    12-23- Patient reports taking Ibuprofen 200 mg at home without problems. Verified X3. [place]    Wasp sting [allergen ext-venom-honey bee] Anaphylaxis    Adhesive Blisters    Iodine and iodide containing products Hives     Allergic to iodine in seafood only    Shellfish containing products     Nuts [tree nut] Rash       Actual Body Weight:   97.5 kg    Renal Function:   Estimated Creatinine Clearance: 219.8 mL/min (A) (based on SCr of 0.4 mg/dL (L)).,     Dialysis Method (if applicable):  N/A    CBC (last 72 hours):  Recent Labs   Lab Result Units 12/23/22  0258 12/24/22  0339   WBC K/uL 7.73 2.21*   Hemoglobin g/dL 8.7* 7.3*   Hematocrit % 27.7* 23.7*   Platelets K/uL 81* 47*   Gran % % 90.2* 77.0*   Lymph % % 3.2* 11.0*   Mono % %  5.2 8.0   Eosinophil % % 0.6 3.0   Basophil % % 0.3 0.0   Differential Method  Automated Manual       Metabolic Panel (last 72 hours):  Recent Labs   Lab Result Units 12/23/22 0258 12/23/22 0501 12/24/22  0339   Sodium mmol/L 136  --  140   Potassium mmol/L 3.4*  --  3.4*   Chloride mmol/L 105  --  110   CO2 mmol/L 26  --  27   Glucose mg/dL 85  --  95   Glucose, UA   --  Negative  --    BUN mg/dL 11  --  10   Creatinine mg/dL 0.8  --  0.4*   Creatinine, Urine mg/dL  --  154.0  --    Albumin g/dL 2.2*  --  1.7*   Total Bilirubin mg/dL 1.8*  --  1.4*   Alkaline Phosphatase U/L 142*  --  111   AST U/L 47*  --  29   ALT U/L 19  --  14   Magnesium mg/dL 1.5*  --  1.8       Vancomycin Administrations:  vancomycin given in the last 96 hours                     vancomycin 1750 mg in 0.9% sodium chloride 500 mL IVPB (mg) 1,750 mg New Bag 12/24/22 0544     1,750 mg New Bag 12/23/22 1826    vancomycin (VANCOCIN) 2,000 mg in dextrose 5 % 250 mL IVPB (mg) 2,000 mg New Bag 12/23/22 0550                    Microbiologic Results:  Microbiology Results (last 7 days)       Procedure Component Value Units Date/Time    Blood culture #1 **CANNOT BE ORDERED STAT** [[id number]] Collected: 12/23/22 0258    Order Status: Completed Specimen: Blood Updated: 12/24/22 0558     Blood Culture, Routine Gram stain aer bottle: Gram negative rods      Gram stain merissa bottle: Gram negative rods      Results called to and read back by:Sari Deng RN 12/24/2022 05:57    Blood culture #2 **CANNOT BE ORDERED STAT** [[id number]] Collected: 12/23/22 0257    Order Status: Completed Specimen: Blood Updated: 12/24/22 0315     Blood Culture, Routine No Growth to date    Urine culture [[id number]] Collected: 12/23/22 0501    Order Status: No result Specimen: Urine Updated: 12/23/22 0514

## 2022-12-24 NOTE — PLAN OF CARE
Problem: Infection  Goal: Absence of Infection Signs and Symptoms  Outcome: Ongoing, Progressing     Problem: Adult Inpatient Plan of Care  Goal: Plan of Care Review  Outcome: Ongoing, Progressing  Goal: Patient-Specific Goal (Individualized)  Outcome: Ongoing, Progressing  Goal: Absence of Hospital-Acquired Illness or Injury  Outcome: Ongoing, Progressing  Goal: Optimal Comfort and Wellbeing  Outcome: Ongoing, Progressing  Goal: Readiness for Transition of Care  Outcome: Ongoing, Progressing     Problem: Adjustment to Illness (Sepsis/Septic Shock)  Goal: Optimal Coping  Outcome: Ongoing, Progressing     Problem: Bleeding (Sepsis/Septic Shock)  Goal: Absence of Bleeding  Outcome: Ongoing, Progressing     Problem: Glycemic Control Impaired (Sepsis/Septic Shock)  Goal: Blood Glucose Level Within Desired Range  Outcome: Ongoing, Progressing     Problem: Nutrition Impaired (Sepsis/Septic Shock)  Goal: Optimal Nutrition Intake  Outcome: Ongoing, Progressing     Problem: Skin Injury Risk Increased  Goal: Skin Health and Integrity  Outcome: Ongoing, Progressing     Problem: Fall Injury Risk  Goal: Absence of Fall and Fall-Related Injury  Outcome: Ongoing, Progressing

## 2022-12-24 NOTE — HOSPITAL COURSE
"12/24/22 CG: Patient remains in the ICU today. Has a lot of muscle spasms and low back pain. Echocardiogram ordered yesterday and completed; significant for pulm HTN but without vegetations.   12/25/22 CG:  Overnight she was having a lot of significant discomfort and pain.  We placed her on Precedex and this has helped significantly with her body aches, her irritability and muscle spasms."  12/26/22 FM:  Patient required Precedex for agitation and mood changes.  Patient is calm and cooperative this morning.  Patient appears to have E coli sepsis so will taper antibiotics.  Patient's echocardiogram showed no vegetation and E coli would be low risk for metastatic infection.  Will transfer the patient to the floor once her Precedex discontinue we counseled her again today on the importance of avoiding substances and illicit drugs.  This continues to be setback for her.  12/27/22 WC:  Patient overall remains stable and is slowly improving.  Urine culture has revealed Candida albicans in addition to blood culture revealing E coli.  12/28/22 WC:  Patient resting comfortably this morning.  Pharm ID on case for treatment duration recs.    12/29/22 WC:  no acute events overnight.  Back pain at this time.   12/20/22:  no acute events continue IV ABX for esbl  12/31/22 CG: stable, continuing antibiotics.   1/1/2 CG: stable, continuing antibiotics. Has not had a bowel movement in two weeks. Will give lactulose.    01/02/2023: Patient did have a bowel movement yesterday.  Completing last day of antimicrobial therapy anticipated discharge tomorrow.  1/3/2023: Improving BM with lactulose. Lower extremity weakness requiring aggressive PT and OT efforts. Patient understands she needs to be an active participant. Encouraged inbed physical exercises without assistance with therapists. Stable Hg/HCT.   1/4/2023: Lactulose every other day for regular bowel movement. PT and OT recommendations with continue efforts to help with lower " extremity weakness which likely precipitated from minimal participation from patient over the past week to remain physically active. Hg/HCT remains stable. Last blood Cx x2 (12/28) NGTD x5 days. Patient agrees moving forward lower extremity strengthening will have to take place with her participation and motivation. Will discharge with continue PT and OT efforts outpatient. She acknowledges outpatient follow up with GI/hepatologist, hematologist and neurologist required to address all other chronic medication conditions.   1/5/2023: Overnight report from nursing that patient exhibited weakness in lower extremities and in upper body strength to support self to safely transfer from bed to commode or even bed to wheelchair. CM involved in placement to SNF. Continue with PT and OT. All other medical condition addressed during hospitalization remains stable.  1/6/2023: CM working on SNF placement. Patient expresses continue effort on her part to regain her strength, reassured her that she has been able to participate following the leads of PT and OT and so a new injury is less likely at this time. While small, she endorsed sitting up at bedside with legs dangle.    1/7/23 ND patient feeling well this morning no complaints will continue therapy services and rehab placement  1/8/23 ND pt reports some back pain will repeat x-rays to make sure everything looks okay from previous surgery.  Otherwise she is doing well  1/9/23 Repeat xray showing increased kyphosis over TL junction. Today, pain associated with massaging feet bilaterally with no worsening of numbness and tingling. No reported concerns with PT and OT efforts. Awaiting placement.  1/10/23 Overnight elevated temperature >101F, no further discomfort at this time and on morning exam denies further symptoms and worsening pain over joints and spine. PT and OT efforts moving forward, but still has significant muscle weakness proximally and over her core. White cells  reduced, CRP elevated. Will obtain MRI scan brain and spine to follow up with worsening scoliosis, identify occult findings.    1/11/23 Second evening with elevated temp >101F with chills and tachycardia. Sepsis precautions taken with labs and cultures. No obvious infectious source, but empirically started merrem for neutropenic fever precautions. Patient this morning endorses good appetite and hopeful in being able to sit up longer today. Yesterday, she was able to stay seated 10 minutes from 3 minutes, the day prior. Pancytopenia profile back to patient's baseline. Overall pain stable and no reported worsening. Lower extremity weakness with loss of flexion at knees and hips against gravity still prominent. Will appreciate neurology consult. Continue PT and OT.   1/12/23 Neurology consult for paraparesis, differential diagnoses include demyelinating disease, ischemic changes (MRI white matter changes), spinal cord lesions (pt with history of past epidural abscesses), compressive myelopathy.   - MRI brain with white matter changes that are concerning for demyelinating disease vs ischemic change  -Thoracic spine radiographs, three views, demonstrate multilevel thoracolumbar fusion with local kyphosis at T9-10, increased when compared to scoliosis series of 04/14/2022, measuring approximately 40 degree on today's exam and 30° on prior exam  Current facility with CT imagining limitations to further determine nature of spinal compression likely affecting bilateral lower extremity, hyperreflexia and reported altered sensation to feet. Pulses intact bilaterally.   Currently awaiting diversion at [facility] to be lifted for transfer to neurosurgery service with further neurology work up.   No fever overnight. Blood cultures NGTD x 2 days. Will continue merrem for another 24 hours, then discontinue if no elevated temperature reading.   1/13/23 No fever overnight, WBC dropped again. NGTD x3 days on all blood cultures  collected after febrile episodes x2, will discontinue merrem and resume suppressive amoxicillin Q12H secondary to partial hardware retention and hx of GBS. Early PM started to complain of  muscle spasms over her entire back that would release with breathing. Tizanidine started and pain control improved. Awaiting transfer to neurosurgery service for advanced imagining and neurology consult.   1/14/23 WC: patient afebrile overnight.  Blood cultures remain negative.  Tolerating transition to suppressive abx therapy with amoxicillin secondary to partial hardware retention.  Endorses ongoing weakness to bilateral lower ext.  Positive babinski bilaterally.  Transfer for advanced imaging and neuro consult.   01/15/2023:  A bed has become available at [facility] main campus.  Patient will be transferred for high-level care to include Hospital Medicine, Neurosurgery and any other potential sources she may need.  We appreciate their assistance with transfer.  1/16/23: No transfer overnight. Patient increasingly experiencing pain focal tenderness over thoracic spine. Patient increasingly agitated and anxious, tearful. Will f/u neurology and psychiatry. Awaiting transfer to care of neurosurgery at [facility].   1/17/23: No transfer overnight. Pain control with increased hydrocodone, gabapentin and adding trazodone at bedtime. Will discontinue xanax PRN to ativan PRN for increasing anxiety. Diversion has been lifted today, awaiting discharges for transfer to move forward.   1/18/23: Increased hydrocodone, added trazodone to increased gabapentin have helped ease pain. Awaiting transfer to [facility] for advanced imagining, neurosurgery and neurology consults.   1/19/23: Small improvement with above pain med changes. Efforts with PT and OT ongoing. Remains medically stable. Awaiting transfer to [facility]. Will reach out to hospitalist with bed availability status.

## 2022-12-25 LAB
ALBUMIN SERPL BCP-MCNC: 1.8 G/DL (ref 3.5–5.2)
ALP SERPL-CCNC: 111 U/L (ref 55–135)
ALT SERPL W/O P-5'-P-CCNC: 15 U/L (ref 10–44)
ANION GAP SERPL CALC-SCNC: 3 MMOL/L (ref 8–16)
AST SERPL-CCNC: 31 U/L (ref 10–40)
BILIRUB SERPL-MCNC: 1.2 MG/DL (ref 0.1–1)
BUN SERPL-MCNC: 12 MG/DL (ref 6–20)
CALCIUM SERPL-MCNC: 7.2 MG/DL (ref 8.7–10.5)
CHLORIDE SERPL-SCNC: 110 MMOL/L (ref 95–110)
CO2 SERPL-SCNC: 27 MMOL/L (ref 23–29)
CREAT SERPL-MCNC: 0.5 MG/DL (ref 0.5–1.4)
ERYTHROCYTE [DISTWIDTH] IN BLOOD BY AUTOMATED COUNT: 16.8 % (ref 11.5–14.5)
EST. GFR  (NO RACE VARIABLE): >60 ML/MIN/1.73 M^2
GLUCOSE SERPL-MCNC: 102 MG/DL (ref 70–110)
HCT VFR BLD AUTO: 24.2 % (ref 37–48.5)
HGB BLD-MCNC: 7.7 G/DL (ref 12–16)
MAGNESIUM SERPL-MCNC: 1.8 MG/DL (ref 1.6–2.6)
MCH RBC QN AUTO: 28 PG (ref 27–31)
MCHC RBC AUTO-ENTMCNC: 31.8 G/DL (ref 32–36)
MCV RBC AUTO: 88 FL (ref 82–98)
PLATELET # BLD AUTO: 47 K/UL (ref 150–450)
PMV BLD AUTO: 9.1 FL (ref 9.2–12.9)
POTASSIUM SERPL-SCNC: 3.8 MMOL/L (ref 3.5–5.1)
PROT SERPL-MCNC: 6.4 G/DL (ref 6–8.4)
RBC # BLD AUTO: 2.75 M/UL (ref 4–5.4)
SODIUM SERPL-SCNC: 140 MMOL/L (ref 136–145)
WBC # BLD AUTO: 1.41 K/UL (ref 3.9–12.7)

## 2022-12-25 PROCEDURE — 85027 COMPLETE CBC AUTOMATED: CPT | Performed by: INTERNAL MEDICINE

## 2022-12-25 PROCEDURE — 83735 ASSAY OF MAGNESIUM: CPT | Performed by: INTERNAL MEDICINE

## 2022-12-25 PROCEDURE — 20000000 HC ICU ROOM

## 2022-12-25 PROCEDURE — 63600175 PHARM REV CODE 636 W HCPCS: Performed by: INTERNAL MEDICINE

## 2022-12-25 PROCEDURE — A4216 STERILE WATER/SALINE, 10 ML: HCPCS | Performed by: INTERNAL MEDICINE

## 2022-12-25 PROCEDURE — 80053 COMPREHEN METABOLIC PANEL: CPT | Performed by: INTERNAL MEDICINE

## 2022-12-25 PROCEDURE — 25000003 PHARM REV CODE 250: Performed by: INTERNAL MEDICINE

## 2022-12-25 PROCEDURE — 63600175 PHARM REV CODE 636 W HCPCS: Performed by: EMERGENCY MEDICINE

## 2022-12-25 PROCEDURE — 36415 COLL VENOUS BLD VENIPUNCTURE: CPT | Performed by: INTERNAL MEDICINE

## 2022-12-25 PROCEDURE — 25000003 PHARM REV CODE 250: Performed by: EMERGENCY MEDICINE

## 2022-12-25 RX ADMIN — HYDROMORPHONE HYDROCHLORIDE 1 MG: 1 INJECTION, SOLUTION INTRAMUSCULAR; INTRAVENOUS; SUBCUTANEOUS at 12:12

## 2022-12-25 RX ADMIN — Medication 1750 MG: at 05:12

## 2022-12-25 RX ADMIN — METHOCARBAMOL TABLETS 500 MG: 500 TABLET, COATED ORAL at 12:12

## 2022-12-25 RX ADMIN — Medication 10 ML: at 05:12

## 2022-12-25 RX ADMIN — METHOCARBAMOL TABLETS 500 MG: 500 TABLET, COATED ORAL at 04:12

## 2022-12-25 RX ADMIN — Medication 10 ML: at 12:12

## 2022-12-25 RX ADMIN — MUPIROCIN: 20 OINTMENT TOPICAL at 09:12

## 2022-12-25 RX ADMIN — PIPERACILLIN AND TAZOBACTAM 4.5 G: 4; .5 INJECTION, POWDER, LYOPHILIZED, FOR SOLUTION INTRAVENOUS; PARENTERAL at 04:12

## 2022-12-25 RX ADMIN — PREGABALIN 75 MG: 75 CAPSULE ORAL at 08:12

## 2022-12-25 RX ADMIN — PIPERACILLIN AND TAZOBACTAM 4.5 G: 4; .5 INJECTION, POWDER, LYOPHILIZED, FOR SOLUTION INTRAVENOUS; PARENTERAL at 08:12

## 2022-12-25 RX ADMIN — MUPIROCIN: 20 OINTMENT TOPICAL at 08:12

## 2022-12-25 RX ADMIN — METHOCARBAMOL TABLETS 500 MG: 500 TABLET, COATED ORAL at 09:12

## 2022-12-25 RX ADMIN — METHOCARBAMOL TABLETS 500 MG: 500 TABLET, COATED ORAL at 08:12

## 2022-12-25 RX ADMIN — PIPERACILLIN AND TAZOBACTAM 4.5 G: 4; .5 INJECTION, POWDER, LYOPHILIZED, FOR SOLUTION INTRAVENOUS; PARENTERAL at 12:12

## 2022-12-25 RX ADMIN — PANTOPRAZOLE SODIUM 40 MG: 40 TABLET, DELAYED RELEASE ORAL at 09:12

## 2022-12-25 RX ADMIN — PREGABALIN 75 MG: 75 CAPSULE ORAL at 09:12

## 2022-12-25 RX ADMIN — DEXMEDETOMIDINE HYDROCHLORIDE 0.2 MCG/KG/HR: 4 INJECTION, SOLUTION INTRAVENOUS at 12:12

## 2022-12-25 NOTE — NURSING
Removed telemetry and sat probe. Reports will not place back until seen by Dr. Educated saw Dr. Livingston this morning. Crying and screaming due to back spasms. Reports gets spasms at home and last all day. Threaten to pull out PICC line if she doesn't get seen right now. Educated on importance of not pulling out medical devices. Attempted to call Dr. Livingston. Awaiting call back.

## 2022-12-25 NOTE — PLAN OF CARE
C/o back spasms. Methocarbamol ordered. Reports takes edge off. Tolerating PO intake. Cont with Abx. Vanc Trough- 15.7. PICC intact

## 2022-12-25 NOTE — CARE UPDATE
"Patient yelling and demanding that the doctor come to see her. Complaining of "cramps down her spine". Dr Livingston was notified and orded precedex drip and dilaudid ivp x one dose. Patient was started at 0.2 mcg/kg of precedex and has remained calm and relaxed for the duration of the night. WBC's critically low at 1.41and platelets are 47.  "

## 2022-12-25 NOTE — SUBJECTIVE & OBJECTIVE
Past Medical History:   Diagnosis Date    Anemia     Anxiety     Depressed     Diskitis     Hep C w/o coma, chronic     IV drug abuse     Kidney stone     Liver cirrhosis        Past Surgical History:   Procedure Laterality Date    ARTHROTOMY OF SHOULDER Left 11/15/2022    Procedure: ARTHROTOMY, SHOULDER;  Surgeon: Bjorn Hayes MD;  Location: [facility];  Service: Orthopedics;  Laterality: Left;  Left acromioclavicular joint arthrotomy    BACK SURGERY      benine tumor removal      forehead, age 9    CHOLECYSTECTOMY      KIDNEY SURGERY      LUMBAR FUSION Bilateral 3/2/2022    Procedure: FUSION, SPINE, LUMBAR;  Surgeon: Guille Templeton MD;  Location: [address];  Service: Neurosurgery;  Laterality: Bilateral;  AIRO  T10--Pelvis    REMOVAL OF HARDWARE FROM SPINE N/A 2/21/2022    Procedure: REMOVAL, HARDWARE, SPINE;  Surgeon: Guille Templeton MD;  Location: [address];  Service: Neurosurgery;  Laterality: N/A;  [place]    SPINE SURGERY         Review of patient's allergies indicates:   Allergen Reactions    Bee venom protein (honey bee) Anaphylaxis     Patient reports she is allergic to bee stings.    Naproxen Anaphylaxis     Throat closing    12-23- Patient reports taking Ibuprofen 200 mg at home without problems. Verified X3. [place]    Wasp sting [allergen ext-venom-honey bee] Anaphylaxis    Adhesive Blisters    Iodine and iodide containing products Hives     Allergic to iodine in seafood only    Shellfish containing products     Nuts [tree nut] Rash       No current facility-administered medications on file prior to encounter.     Current Outpatient Medications on File Prior to Encounter   Medication Sig    acetaminophen (TYLENOL) 325 MG tablet Take 2 tablets (650 mg total) by mouth every 6 (six) hours as needed for Temperature greater than (100.4 F).    albuterol (ACCUNEB) 1.25 mg/3 mL Nebu Take 3 mLs (1.25 mg total) by nebulization every 6 (six) hours as needed (shortness of breath). Rescue    amoxicillin  (AMOXIL) 500 MG capsule Take 2 capsules (1,000 mg total) by mouth every 12 (twelve) hours.    buPROPion (WELLBUTRIN) 100 MG tablet Take 1 tablet (100 mg total) by mouth 2 (two) times daily.    ciprofloxacin HCl (CIPRO) 500 MG tablet Take 500 mg by mouth 2 (two) times daily.    folic acid (FOLVITE) 1 MG tablet Take 1 tablet (1 mg total) by mouth once daily. (Patient not taking: Reported on 11/25/2022)    pantoprazole (PROTONIX) 40 MG tablet Take 1 tablet (40 mg total) by mouth once daily.    pregabalin (LYRICA) 75 MG capsule Take 1 capsule (75 mg total) by mouth 3 (three) times daily.    sulfamethoxazole-trimethoprim 800-160mg (BACTRIM DS) 800-160 mg Tab Take 2 tablets by mouth 2 (two) times daily.    [DISCONTINUED] diclofenac sodium (VOLTAREN) 1 % Gel Apply 2 g topically 4 (four) times daily.     Family History       Problem Relation (Age of Onset)    Cirrhosis Father    Drug abuse Mother, Father    Hepatitis Mother, Father    Liver cancer Mother          Tobacco Use    Smoking status: Some Days     Packs/day: 0.50     Years: 23.00     Pack years: 11.50     Types: Cigarettes    Smokeless tobacco: Never    Tobacco comments:     patient states she knows she nees to quit.   Substance and Sexual Activity    Alcohol use: Not Currently     Comment: quit 2014    Drug use: Yes     Frequency: 4.0 times per week     Types: Marijuana     Comment: Patient denies needle use, only inhalation of methampehtamines or orally.  Last known drug use was prior to admission to hospital stay for surgery    Sexual activity: Yes     Partners: Male     Birth control/protection: None     Review of Systems   Unable to perform ROS: Acuity of condition   Objective:     Vital Signs (Most Recent):  Temp: 97.8 °F (36.6 °C) (12/25/22 1116)  Pulse: 86 (12/25/22 1245)  Resp: 17 (12/25/22 1245)  BP: 110/75 (12/25/22 1230)  SpO2: 99 % (12/25/22 1245)   Vital Signs (24h Range):  Temp:  [97.6 °F (36.4 °C)-98.2 °F (36.8 °C)] 97.8 °F (36.6 °C)  Pulse:   [78-97] 86  Resp:  [13-27] 17  SpO2:  [95 %-100 %] 99 %  BP: (101-180)/(52-96) 110/75     Weight: 97.5 kg (215 lb)  Body mass index is 33.67 kg/m².    Physical Exam  Vitals and nursing note reviewed.   Constitutional:       General: She is in acute distress.      Appearance: She is obese. She is ill-appearing.   HENT:      Head: Normocephalic and atraumatic.      Nose: Nose normal. No congestion or rhinorrhea.      Mouth/Throat:      Mouth: Mucous membranes are dry.      Pharynx: No oropharyngeal exudate.   Eyes:      General: No scleral icterus.  Neck:      Vascular: No carotid bruit.   Cardiovascular:      Rate and Rhythm: Regular rhythm. Tachycardia present.      Heart sounds: Murmur heard.     No friction rub. No gallop.   Pulmonary:      Effort: No respiratory distress.      Breath sounds: No stridor. No wheezing, rhonchi or rales.   Chest:      Chest wall: No tenderness.   Abdominal:      General: There is no distension.      Palpations: There is no mass.      Tenderness: There is no abdominal tenderness. There is no right CVA tenderness, left CVA tenderness, guarding or rebound.      Hernia: No hernia is present.   Musculoskeletal:         General: No swelling, tenderness or deformity.      Cervical back: Neck supple. No rigidity or tenderness.      Right lower leg: Edema present.      Left lower leg: Edema present.   Lymphadenopathy:      Cervical: No cervical adenopathy.   Skin:     Capillary Refill: Capillary refill takes less than 2 seconds.      Coloration: Skin is not jaundiced or pale.   Neurological:      Cranial Nerves: No cranial nerve deficit.      Sensory: Sensory deficit present.      Motor: Weakness present.   Psychiatric:      Comments: Drowsy, answers questions, moving all extremities.           Significant Labs: CBC:   Recent Labs   Lab 12/24/22  0339 12/25/22  0351   WBC 2.21* 1.41*   HGB 7.3* 7.7*   HCT 23.7* 24.2*   PLT 47* 47*       CMP:   Recent Labs   Lab 12/24/22  0339 12/25/22  0351     140   K 3.4* 3.8    110   CO2 27 27   GLU 95 102   BUN 10 12   CREATININE 0.4* 0.5   CALCIUM 7.3* 7.2*   PROT 6.2 6.4   ALBUMIN 1.7* 1.8*   BILITOT 1.4* 1.2*   ALKPHOS 111 111   AST 29 31   ALT 14 15   ANIONGAP 3* 3*       Urine Studies:   No results for input(s): COLORU, APPEARANCEUA, PHUR, SPECGRAV, PROTEINUA, GLUCUA, KETONESU, BILIRUBINUA, OCCULTUA, NITRITE, UROBILINOGEN, LEUKOCYTESUR, RBCUA, WBCUA, BACTERIA, SQUAMEPITHEL, HYALINECASTS in the last 48 hours.    Invalid input(s): TREY      Significant Imaging: I have reviewed all pertinent imaging results/findings within the past 24 hours.

## 2022-12-25 NOTE — PLAN OF CARE
Problem: Infection  Goal: Absence of Infection Signs and Symptoms  Outcome: Ongoing, Progressing     Problem: Adult Inpatient Plan of Care  Goal: Plan of Care Review  Outcome: Ongoing, Progressing  Goal: Patient-Specific Goal (Individualized)  Outcome: Ongoing, Progressing  Goal: Absence of Hospital-Acquired Illness or Injury  Outcome: Ongoing, Progressing  Goal: Optimal Comfort and Wellbeing  Outcome: Ongoing, Progressing  Goal: Readiness for Transition of Care  Outcome: Ongoing, Progressing     Problem: Adjustment to Illness (Sepsis/Septic Shock)  Goal: Optimal Coping  Outcome: Ongoing, Progressing     Problem: Bleeding (Sepsis/Septic Shock)  Goal: Absence of Bleeding  Outcome: Ongoing, Progressing     Problem: Glycemic Control Impaired (Sepsis/Septic Shock)  Goal: Blood Glucose Level Within Desired Range  Outcome: Ongoing, Progressing     Problem: Infection Progression (Sepsis/Septic Shock)  Goal: Absence of Infection Signs and Symptoms  Outcome: Ongoing, Progressing     Problem: Nutrition Impaired (Sepsis/Septic Shock)  Goal: Optimal Nutrition Intake  Outcome: Ongoing, Progressing     Problem: Skin Injury Risk Increased  Goal: Skin Health and Integrity  Outcome: Ongoing, Progressing     Problem: Fall Injury Risk  Goal: Absence of Fall and Fall-Related Injury  Outcome: Ongoing, Progressing

## 2022-12-25 NOTE — EICU
Intervention Initiated From:  COR / EDMARU    Patrick intervened regarding:  Rounding (Video assessment)    Nurse Notified:  No    Doctor Notified:  No    Comments: Pt on RA sats in 90's.  VSS. Pt resting with eyes closed.  No medications infusing.  No family at bedside.  NAD observed.

## 2022-12-25 NOTE — PROGRESS NOTES
"[facility] Medicine  Progress Note    Patient Name: Rachel Zazueta  MRN: [id number]  Patient Class: IP- Inpatient   Admission Date: 12/23/2022  Length of Stay: 2 days  Attending Physician: Hal Barger Jr., MD  Primary Care Provider: Dannielle Merino DO        Subjective:     Principal Problem:Severe sepsis        HPI:  ER HPI:  42-year-old female with a history of anemia, anxiety, recurrent cellulitis, hepatitis C, IVDU, liver cirrhosis, cholecystectomy, kidney stones, lumbar fusion, shoulder surgery, chronic back pain comes in c/o generalized weakness, fatigue, and bilateral flank pain.  She reports that this has been going on intermittently for several days and was diagnosed with a UTI recently.  No fever.  No abdominal pain or vomiting or diarrhea.     IM HPI:  Patient is well known to our service.  Patient has a history of IV drug abuse and unfortunately has relapsed.  Patient has a history of a epidural abscess requiring a large lumbar surgery with multiple rehabilitative stays and therapy.  The patient was progressing to ambulation and recently has declined.  Patient admits to starting drugs including IV drug use again.  Patient presented to the ED with the above symptoms and she has a noticeable cardiac murmur today that we are getting a stat echocardiogram.  Patient's been started on antibiotics fluid resuscitated and seems to have sepsis.  Patient's urinary studies were abnormal but not overwhelming.      Overview/Hospital Course:  12/24/22 CG: Patient remains in the ICU today. Has a lot of muscle spasms and low back pain. Echocardiogram ordered yesterday and completed; significant for pulm HTN but without vegetations.   12/25/22 CG:  Overnight she was having a lot of significant discomfort and pain.  We placed her on Precedex and this has helped significantly with her body aches, her irritability and muscle spasms."      Past Medical History:   Diagnosis Date    Anemia  "    Anxiety     Depressed     Diskitis     Hep C w/o coma, chronic     IV drug abuse     Kidney stone     Liver cirrhosis        Past Surgical History:   Procedure Laterality Date    ARTHROTOMY OF SHOULDER Left 11/15/2022    Procedure: ARTHROTOMY, SHOULDER;  Surgeon: Bjorn Hayes MD;  Location: [facility];  Service: Orthopedics;  Laterality: Left;  Left acromioclavicular joint arthrotomy    BACK SURGERY      benine tumor removal      forehead, 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9    CHOLECYSTECTOMY      KIDNEY SURGERY      LUMBAR FUSION Bilateral 3/2/2022    Procedure: FUSION, SPINE, LUMBAR;  Surgeon: Guille Templeton MD;  Location: [address];  Service: Neurosurgery;  Laterality: Bilateral;  AIRO  T10--Pelvis    REMOVAL OF HARDWARE FROM SPINE N/A 2/21/2022    Procedure: REMOVAL, HARDWARE, SPINE;  Surgeon: Guille Templeton MD;  Location: [address];  Service: Neurosurgery;  Laterality: N/A;  [place]    SPINE SURGERY         Review of patient's allergies indicates:   Allergen Reactions    Bee venom protein (honey bee) Anaphylaxis     Patient reports she is allergic to bee stings.    Naproxen Anaphylaxis     Throat closing    12-23- Patient reports taking Ibuprofen 200 mg at home without problems. Verified X3. [place]    Wasp sting [allergen ext-venom-honey bee] Anaphylaxis    Adhesive Blisters    Iodine and iodide containing products Hives     Allergic to iodine in seafood only    Shellfish containing products     Nuts [tree nut] Rash       No current facility-administered medications on file prior to encounter.     Current Outpatient Medications on File Prior to Encounter   Medication Sig    acetaminophen (TYLENOL) 325 MG tablet Take 2 tablets (650 mg total) by mouth every 6 (six) hours as needed for Temperature greater than (100.4 F).    albuterol (ACCUNEB) 1.25 mg/3 mL Nebu Take 3 mLs (1.25 mg total) by nebulization every 6 (six) hours as needed (shortness of breath). Rescue    amoxicillin (AMOXIL) 500 MG capsule Take 2  capsules (1,000 mg total) by mouth every 12 (twelve) hours.    buPROPion (WELLBUTRIN) 100 MG tablet Take 1 tablet (100 mg total) by mouth 2 (two) times daily.    ciprofloxacin HCl (CIPRO) 500 MG tablet Take 500 mg by mouth 2 (two) times daily.    folic acid (FOLVITE) 1 MG tablet Take 1 tablet (1 mg total) by mouth once daily. (Patient not taking: Reported on 11/25/2022)    pantoprazole (PROTONIX) 40 MG tablet Take 1 tablet (40 mg total) by mouth once daily.    pregabalin (LYRICA) 75 MG capsule Take 1 capsule (75 mg total) by mouth 3 (three) times daily.    sulfamethoxazole-trimethoprim 800-160mg (BACTRIM DS) 800-160 mg Tab Take 2 tablets by mouth 2 (two) times daily.    [DISCONTINUED] diclofenac sodium (VOLTAREN) 1 % Gel Apply 2 g topically 4 (four) times daily.     Family History       Problem Relation (Age of Onset)    Cirrhosis Father    Drug abuse Mother, Father    Hepatitis Mother, Father    Liver cancer Mother          Tobacco Use    Smoking status: Some Days     Packs/day: 0.50     Years: 23.00     Pack years: 11.50     Types: Cigarettes    Smokeless tobacco: Never    Tobacco comments:     patient states she knows she nees to quit.   Substance and Sexual Activity    Alcohol use: Not Currently     Comment: quit 2014    Drug use: Yes     Frequency: 4.0 times per week     Types: Marijuana     Comment: Patient denies needle use, only inhalation of methampehtamines or orally.  Last known drug use was prior to admission to hospital stay for surgery    Sexual activity: Yes     Partners: Male     Birth control/protection: None     Review of Systems   Unable to perform ROS: Acuity of condition   Objective:     Vital Signs (Most Recent):  Temp: 97.8 °F (36.6 °C) (12/25/22 1116)  Pulse: 86 (12/25/22 1245)  Resp: 17 (12/25/22 1245)  BP: 110/75 (12/25/22 1230)  SpO2: 99 % (12/25/22 1245)   Vital Signs (24h Range):  Temp:  [97.6 °F (36.4 °C)-98.2 °F (36.8 °C)] 97.8 °F (36.6 °C)  Pulse:  [78-97] 86  Resp:   [13-27] 17  SpO2:  [95 %-100 %] 99 %  BP: (101-180)/(52-96) 110/75     Weight: 97.5 kg (215 lb)  Body mass index is 33.67 kg/m².    Physical Exam  Vitals and nursing note reviewed.   Constitutional:       General: She is in acute distress.      Appearance: She is obese. She is ill-appearing.   HENT:      Head: Normocephalic and atraumatic.      Nose: Nose normal. No congestion or rhinorrhea.      Mouth/Throat:      Mouth: Mucous membranes are dry.      Pharynx: No oropharyngeal exudate.   Eyes:      General: No scleral icterus.  Neck:      Vascular: No carotid bruit.   Cardiovascular:      Rate and Rhythm: Regular rhythm. Tachycardia present.      Heart sounds: Murmur heard.     No friction rub. No gallop.   Pulmonary:      Effort: No respiratory distress.      Breath sounds: No stridor. No wheezing, rhonchi or rales.   Chest:      Chest wall: No tenderness.   Abdominal:      General: There is no distension.      Palpations: There is no mass.      Tenderness: There is no abdominal tenderness. There is no right CVA tenderness, left CVA tenderness, guarding or rebound.      Hernia: No hernia is present.   Musculoskeletal:         General: No swelling, tenderness or deformity.      Cervical back: Neck supple. No rigidity or tenderness.      Right lower leg: Edema present.      Left lower leg: Edema present.   Lymphadenopathy:      Cervical: No cervical adenopathy.   Skin:     Capillary Refill: Capillary refill takes less than 2 seconds.      Coloration: Skin is not jaundiced or pale.   Neurological:      Cranial Nerves: No cranial nerve deficit.      Sensory: Sensory deficit present.      Motor: Weakness present.   Psychiatric:      Comments: Drowsy, answers questions, moving all extremities.           Significant Labs: CBC:   Recent Labs   Lab 12/24/22  0339 12/25/22  0351   WBC 2.21* 1.41*   HGB 7.3* 7.7*   HCT 23.7* 24.2*   PLT 47* 47*       CMP:   Recent Labs   Lab 12/24/22  0339 12/25/22  0351    140   K  3.4* 3.8    110   CO2 27 27   GLU 95 102   BUN 10 12   CREATININE 0.4* 0.5   CALCIUM 7.3* 7.2*   PROT 6.2 6.4   ALBUMIN 1.7* 1.8*   BILITOT 1.4* 1.2*   ALKPHOS 111 111   AST 29 31   ALT 14 15   ANIONGAP 3* 3*       Urine Studies:   No results for input(s): COLORU, APPEARANCEUA, PHUR, SPECGRAV, PROTEINUA, GLUCUA, KETONESU, BILIRUBINUA, OCCULTUA, NITRITE, UROBILINOGEN, LEUKOCYTESUR, RBCUA, WBCUA, BACTERIA, SQUAMEPITHEL, HYALINECASTS in the last 48 hours.    Invalid input(s): WRIGHTSUR      Significant Imaging: I have reviewed all pertinent imaging results/findings within the past 24 hours.      Assessment/Plan:      * Severe sepsis  This patient does have evidence of infective focus  My overall impression is sepsis. Vital signs were reviewed and noted in progress note.  Antibiotics given-   Antibiotics (From admission, onward)    Start     Stop Route Frequency Ordered    12/23/22 1800  vancomycin 1750 mg in 0.9% sodium chloride 500 mL IVPB         -- IV Every 12 hours (non-standard times) 12/23/22 0653    12/23/22 1245  piperacillin-tazobactam (ZOSYN) 4.5 g in dextrose 5 % in water (D5W) 5 % 100 mL IVPB (MB+)         -- IV Every 8 hours (non-standard times) 12/23/22 0647    12/23/22 0945  mupirocin 2 % ointment         12/28 0859 Nasl 2 times daily 12/23/22 0839    12/23/22 0647  vancomycin - pharmacy to dose  (vancomycin IVPB)        See Hyperspace for full Linked Orders Report.    -- IV pharmacy to manage frequency 12/23/22 0647        Cultures were taken-   Microbiology Results (last 7 days)     Procedure Component Value Units Date/Time    Urine culture [[id number]] Collected: 12/23/22 0501    Order Status: No result Specimen: Urine Updated: 12/23/22 0514    Blood culture #1 **CANNOT BE ORDERED STAT** [[id number]] Collected: 12/23/22 0258    Order Status: Sent Specimen: Blood Updated: 12/23/22 0258    Blood culture #2 **CANNOT BE ORDERED STAT** [[id number]] Collected: 12/23/22 0257    Order Status: Sent  Specimen: Blood Updated: 12/23/22 0258        Latest lactate reviewed, they are-  Recent Labs   Lab 12/23/22 0258 12/23/22  0701   LACTATE 3.6* 1.8       Organ dysfunction indicated by Acute kidney injury, Encephalopathy  and Acute liver injury  Source- ?    Source control Achieved by- see orders      Pulmonary hypertension  Found incidentally on echocardiogram with evidence of elevated right sided pressures and dilated heart chambers on the right side. Unclear the exact etiology or class of Pulm HTN but given her significant substance abuse Class 1 certainly is a possibility.   - Will refer to outpatient pulmonologist.   - Will consider a cariology consult while inpatient to see if they think it would be beneficial to have a heart cath done while inpatient      Murmur, cardiac  No evidence of vegetations    Mild episode of recurrent major depressive disorder    Patient with altered mental status holding antidepressants for now      Cannabis use disorder, moderate, dependence  As above      Amphetamine use disorder, severe  Continue to  educate and support      Spinal stenosis at L4-L5 level  Will add Precedex       Substance use disorder  - Add Precedex      Chronic hepatitis C with cirrhosis  Labs noted      Cirrhosis of liver without ascites  Labs noted      UTI (urinary tract infection)  On abx, ? Source.        VTE Risk Mitigation (From admission, onward)         Ordered     IP VTE HIGH RISK PATIENT  Once         12/23/22 0647     Place sequential compression device  Until discontinued         12/23/22 0647                Discharge Planning   SHIRA:      Code Status: Full Code   Is the patient medically ready for discharge?:     Reason for patient still in hospital (select all that apply): Treatment  Discharge Plan A: Home with family                  Eleazar Livingston DO  Department of Hospital Medicine   [facility]

## 2022-12-26 LAB
ANION GAP SERPL CALC-SCNC: 2 MMOL/L (ref 8–16)
BACTERIA BLD CULT: ABNORMAL
BACTERIA UR CULT: ABNORMAL
BASOPHILS # BLD AUTO: ABNORMAL K/UL (ref 0–0.2)
BASOPHILS NFR BLD: 0 % (ref 0–1.9)
BUN SERPL-MCNC: 10 MG/DL (ref 6–20)
CALCIUM SERPL-MCNC: 7.1 MG/DL (ref 8.7–10.5)
CHLORIDE SERPL-SCNC: 108 MMOL/L (ref 95–110)
CO2 SERPL-SCNC: 28 MMOL/L (ref 23–29)
CREAT SERPL-MCNC: 0.5 MG/DL (ref 0.5–1.4)
DIFFERENTIAL METHOD: ABNORMAL
EOSINOPHIL # BLD AUTO: ABNORMAL K/UL (ref 0–0.5)
EOSINOPHIL NFR BLD: 5 % (ref 0–8)
ERYTHROCYTE [DISTWIDTH] IN BLOOD BY AUTOMATED COUNT: 16.4 % (ref 11.5–14.5)
EST. GFR  (NO RACE VARIABLE): >60 ML/MIN/1.73 M^2
GLUCOSE SERPL-MCNC: 123 MG/DL (ref 70–110)
HCT VFR BLD AUTO: 25.6 % (ref 37–48.5)
HGB BLD-MCNC: 8.1 G/DL (ref 12–16)
IMM GRANULOCYTES # BLD AUTO: ABNORMAL K/UL (ref 0–0.04)
IMM GRANULOCYTES NFR BLD AUTO: ABNORMAL % (ref 0–0.5)
LYMPHOCYTES # BLD AUTO: ABNORMAL K/UL (ref 1–4.8)
LYMPHOCYTES NFR BLD: 29 % (ref 18–48)
MCH RBC QN AUTO: 27.9 PG (ref 27–31)
MCHC RBC AUTO-ENTMCNC: 31.6 G/DL (ref 32–36)
MCV RBC AUTO: 88 FL (ref 82–98)
METAMYELOCYTES NFR BLD MANUAL: 1 %
MONOCYTES # BLD AUTO: ABNORMAL K/UL (ref 0.3–1)
MONOCYTES NFR BLD: 8 % (ref 4–15)
NEUTROPHILS NFR BLD: 55 % (ref 38–73)
NEUTS BAND NFR BLD MANUAL: 2 %
NRBC BLD-RTO: 0 /100 WBC
PLATELET # BLD AUTO: 52 K/UL (ref 150–450)
PMV BLD AUTO: 9.1 FL (ref 9.2–12.9)
POTASSIUM SERPL-SCNC: 3.8 MMOL/L (ref 3.5–5.1)
RBC # BLD AUTO: 2.9 M/UL (ref 4–5.4)
SODIUM SERPL-SCNC: 138 MMOL/L (ref 136–145)
WBC # BLD AUTO: 1.47 K/UL (ref 3.9–12.7)

## 2022-12-26 PROCEDURE — 63600175 PHARM REV CODE 636 W HCPCS: Performed by: INTERNAL MEDICINE

## 2022-12-26 PROCEDURE — 25000003 PHARM REV CODE 250: Performed by: EMERGENCY MEDICINE

## 2022-12-26 PROCEDURE — 85007 BL SMEAR W/DIFF WBC COUNT: CPT | Performed by: INTERNAL MEDICINE

## 2022-12-26 PROCEDURE — A4216 STERILE WATER/SALINE, 10 ML: HCPCS | Performed by: INTERNAL MEDICINE

## 2022-12-26 PROCEDURE — 80048 BASIC METABOLIC PNL TOTAL CA: CPT | Performed by: INTERNAL MEDICINE

## 2022-12-26 PROCEDURE — 25000003 PHARM REV CODE 250: Performed by: INTERNAL MEDICINE

## 2022-12-26 PROCEDURE — 97166 OT EVAL MOD COMPLEX 45 MIN: CPT

## 2022-12-26 PROCEDURE — 97162 PT EVAL MOD COMPLEX 30 MIN: CPT

## 2022-12-26 PROCEDURE — 63600175 PHARM REV CODE 636 W HCPCS: Performed by: EMERGENCY MEDICINE

## 2022-12-26 PROCEDURE — 36415 COLL VENOUS BLD VENIPUNCTURE: CPT | Performed by: INTERNAL MEDICINE

## 2022-12-26 PROCEDURE — 20000000 HC ICU ROOM

## 2022-12-26 PROCEDURE — 85027 COMPLETE CBC AUTOMATED: CPT | Performed by: INTERNAL MEDICINE

## 2022-12-26 RX ORDER — FLUCONAZOLE 100 MG/1
200 TABLET ORAL DAILY
Status: COMPLETED | OUTPATIENT
Start: 2022-12-26 | End: 2023-01-01

## 2022-12-26 RX ORDER — ENOXAPARIN SODIUM 100 MG/ML
40 INJECTION SUBCUTANEOUS EVERY 24 HOURS
Status: DISCONTINUED | OUTPATIENT
Start: 2022-12-26 | End: 2022-12-28

## 2022-12-26 RX ORDER — MEROPENEM AND SODIUM CHLORIDE 1 G/50ML
1 INJECTION, SOLUTION INTRAVENOUS
Status: DISCONTINUED | OUTPATIENT
Start: 2022-12-26 | End: 2023-01-02

## 2022-12-26 RX ADMIN — PIPERACILLIN AND TAZOBACTAM 4.5 G: 4; .5 INJECTION, POWDER, LYOPHILIZED, FOR SOLUTION INTRAVENOUS; PARENTERAL at 04:12

## 2022-12-26 RX ADMIN — PIPERACILLIN AND TAZOBACTAM 4.5 G: 4; .5 INJECTION, POWDER, LYOPHILIZED, FOR SOLUTION INTRAVENOUS; PARENTERAL at 12:12

## 2022-12-26 RX ADMIN — MEROPENEM AND SODIUM CHLORIDE 1 G: 1 INJECTION, SOLUTION INTRAVENOUS at 02:12

## 2022-12-26 RX ADMIN — PANTOPRAZOLE SODIUM 40 MG: 40 TABLET, DELAYED RELEASE ORAL at 08:12

## 2022-12-26 RX ADMIN — METHOCARBAMOL TABLETS 500 MG: 500 TABLET, COATED ORAL at 12:12

## 2022-12-26 RX ADMIN — MUPIROCIN: 20 OINTMENT TOPICAL at 08:12

## 2022-12-26 RX ADMIN — PREGABALIN 75 MG: 75 CAPSULE ORAL at 09:12

## 2022-12-26 RX ADMIN — Medication 10 ML: at 05:12

## 2022-12-26 RX ADMIN — Medication 10 ML: at 12:12

## 2022-12-26 RX ADMIN — FLUCONAZOLE 200 MG: 100 TABLET ORAL at 03:12

## 2022-12-26 RX ADMIN — METHOCARBAMOL TABLETS 500 MG: 500 TABLET, COATED ORAL at 05:12

## 2022-12-26 RX ADMIN — METHOCARBAMOL TABLETS 500 MG: 500 TABLET, COATED ORAL at 09:12

## 2022-12-26 RX ADMIN — MEROPENEM AND SODIUM CHLORIDE 1 G: 1 INJECTION, SOLUTION INTRAVENOUS at 11:12

## 2022-12-26 RX ADMIN — PREGABALIN 75 MG: 75 CAPSULE ORAL at 08:12

## 2022-12-26 RX ADMIN — DEXMEDETOMIDINE HYDROCHLORIDE 0.2 MCG/KG/HR: 4 INJECTION, SOLUTION INTRAVENOUS at 08:12

## 2022-12-26 RX ADMIN — MEROPENEM AND SODIUM CHLORIDE 1 G: 1 INJECTION, SOLUTION INTRAVENOUS at 03:12

## 2022-12-26 RX ADMIN — METHOCARBAMOL TABLETS 500 MG: 500 TABLET, COATED ORAL at 08:12

## 2022-12-26 RX ADMIN — Medication 1750 MG: at 05:12

## 2022-12-26 RX ADMIN — MUPIROCIN: 20 OINTMENT TOPICAL at 09:12

## 2022-12-26 NOTE — EICU
Intervention Initiated From:  COR / EDMARU    Patrick intervened regarding:  Rounding (Video assessment)    Comments: Video assessment completed.  Pt lying in bed with eyes closed.  Resp even and unlabored.  Precedex infusion in progress at 0.2 mcg/kg/hr.  NAD noted at present.

## 2022-12-26 NOTE — ASSESSMENT & PLAN NOTE
This patient does have evidence of infective focus  My overall impression is sepsis. Vital signs were reviewed and noted in progress note.  Antibiotics given-   Antibiotics (From admission, onward)    Start     Stop Route Frequency Ordered    12/23/22 1245  piperacillin-tazobactam (ZOSYN) 4.5 g in dextrose 5 % in water (D5W) 5 % 100 mL IVPB (MB+)         -- IV Every 8 hours (non-standard times) 12/23/22 0647    12/23/22 0945  mupirocin 2 % ointment         12/28 0859 Nasl 2 times daily 12/23/22 0839    12/23/22 0533  vancomycin (VANCOCIN) 1,000 mg injection        Note to Pharmacy: Created by cabinet override    12/23 1744   12/23/22 0533    12/23/22 0405  piperacillin-tazobactam (ZOSYN) 4.5 gram injection        Note to Pharmacy: Created by cabinet override    12/23 1614   12/23/22 0405        Cultures were taken-   Microbiology Results (last 7 days)     Procedure Component Value Units Date/Time    Urine culture [[id number]] Collected: 12/23/22 0501    Order Status: Completed Specimen: Urine Updated: 12/26/22 0911     Urine Culture, Routine Further report to follow    Narrative:      Preferred Collection Type->Urine, Clean Catch  Specimen Source->Urine    Blood culture #2 **CANNOT BE ORDERED STAT** [[id number]] Collected: 12/23/22 0257    Order Status: Completed Specimen: Blood Updated: 12/25/22 2022     Blood Culture, Routine No Growth to date      No Growth to date      No Growth to date    Blood culture #1 **CANNOT BE ORDERED STAT** [[id number]]  (Abnormal) Collected: 12/23/22 0258    Order Status: Completed Specimen: Blood Updated: 12/25/22 0946     Blood Culture, Routine Gram stain aer bottle: Gram negative rods      Gram stain merissa bottle: Gram negative rods      Results called to and read back by:Sari Deng RN 12/24/2022 05:57      ESCHERICHIA COLI  Susceptibility pending          Latest lactate reviewed, they are-  No results for input(s): LACTATE in the last 72 hours.    Organ dysfunction indicated by  Acute kidney injury, Encephalopathy  and Acute liver injury  Source- ?    Source control Achieved by- see orders

## 2022-12-26 NOTE — PROGRESS NOTES
"[facility] Medicine  Progress Note    Patient Name: Rachel Zazueta  MRN: [id number]  Patient Class: IP- Inpatient   Admission Date: 12/23/2022  Length of Stay: 3 days  Attending Physician: Hal Barger Jr., MD  Primary Care Provider: Dannielle Merino DO        Subjective:     Principal Problem:Severe sepsis        HPI:  ER HPI:  42-year-old female with a history of anemia, anxiety, recurrent cellulitis, hepatitis C, IVDU, liver cirrhosis, cholecystectomy, kidney stones, lumbar fusion, shoulder surgery, chronic back pain comes in c/o generalized weakness, fatigue, and bilateral flank pain.  She reports that this has been going on intermittently for several days and was diagnosed with a UTI recently.  No fever.  No abdominal pain or vomiting or diarrhea.     IM HPI:  Patient is well known to our service.  Patient has a history of IV drug abuse and unfortunately has relapsed.  Patient has a history of a epidural abscess requiring a large lumbar surgery with multiple rehabilitative stays and therapy.  The patient was progressing to ambulation and recently has declined.  Patient admits to starting drugs including IV drug use again.  Patient presented to the ED with the above symptoms and she has a noticeable cardiac murmur today that we are getting a stat echocardiogram.  Patient's been started on antibiotics fluid resuscitated and seems to have sepsis.  Patient's urinary studies were abnormal but not overwhelming.      Overview/Hospital Course:  12/24/22 CG: Patient remains in the ICU today. Has a lot of muscle spasms and low back pain. Echocardiogram ordered yesterday and completed; significant for pulm HTN but without vegetations.   12/25/22 CG:  Overnight she was having a lot of significant discomfort and pain.  We placed her on Precedex and this has helped significantly with her body aches, her irritability and muscle spasms."  12/26/22 FM:  Patient required Precedex for agitation " and mood changes.  Patient is calm and cooperative this morning.  Patient appears to have E coli sepsis so will taper antibiotics.  Patient's echocardiogram showed no vegetation and E coli would be low risk for metastatic infection.  Will transfer the patient to the floor once her Precedex discontinue we counseled her again today on the importance of avoiding substances and illicit drugs.  This continues to be setback for her.      Past Medical History:   Diagnosis Date    Anemia     Anxiety     Depressed     Diskitis     Hep C w/o coma, chronic     IV drug abuse     Kidney stone     Liver cirrhosis        Past Surgical History:   Procedure Laterality Date    ARTHROTOMY OF SHOULDER Left 11/15/2022    Procedure: ARTHROTOMY, SHOULDER;  Surgeon: Bjorn Hayes MD;  Location: [facility];  Service: Orthopedics;  Laterality: Left;  Left acromioclavicular joint arthrotomy    BACK SURGERY      benine tumor removal      forehead, age 9    CHOLECYSTECTOMY      KIDNEY SURGERY      LUMBAR FUSION Bilateral 3/2/2022    Procedure: FUSION, SPINE, LUMBAR;  Surgeon: Guille Templeton MD;  Location: [address];  Service: Neurosurgery;  Laterality: Bilateral;  AIRO  T10--Pelvis    REMOVAL OF HARDWARE FROM SPINE N/A 2/21/2022    Procedure: REMOVAL, HARDWARE, SPINE;  Surgeon: Guille Templeton MD;  Location: [facility] OR [address] [place] [address];  Service: Neurosurgery;  Laterality: N/A;  [place]    SPINE SURGERY         Review of patient's allergies indicates:   Allergen Reactions    Bee venom protein (honey bee) Anaphylaxis     Patient reports she is allergic to bee stings.    Naproxen Anaphylaxis     Throat closing    12-23- Patient reports taking Ibuprofen 200 mg at home without problems. Verified X3. [place]    Wasp sting [allergen ext-venom-honey bee] Anaphylaxis    Adhesive Blisters    Iodine and iodide containing products Hives     Allergic to iodine in seafood only    Shellfish containing products     Nuts [tree nut] Rash       No  current facility-administered medications on file prior to encounter.     Current Outpatient Medications on File Prior to Encounter   Medication Sig    acetaminophen (TYLENOL) 325 MG tablet Take 2 tablets (650 mg total) by mouth every 6 (six) hours as needed for Temperature greater than (100.4 F).    albuterol (ACCUNEB) 1.25 mg/3 mL Nebu Take 3 mLs (1.25 mg total) by nebulization every 6 (six) hours as needed (shortness of breath). Rescue    amoxicillin (AMOXIL) 500 MG capsule Take 2 capsules (1,000 mg total) by mouth every 12 (twelve) hours.    buPROPion (WELLBUTRIN) 100 MG tablet Take 1 tablet (100 mg total) by mouth 2 (two) times daily.    ciprofloxacin HCl (CIPRO) 500 MG tablet Take 500 mg by mouth 2 (two) times daily.    folic acid (FOLVITE) 1 MG tablet Take 1 tablet (1 mg total) by mouth once daily. (Patient not taking: Reported on 11/25/2022)    pantoprazole (PROTONIX) 40 MG tablet Take 1 tablet (40 mg total) by mouth once daily.    pregabalin (LYRICA) 75 MG capsule Take 1 capsule (75 mg total) by mouth 3 (three) times daily.    sulfamethoxazole-trimethoprim 800-160mg (BACTRIM DS) 800-160 mg Tab Take 2 tablets by mouth 2 (two) times daily.    [DISCONTINUED] diclofenac sodium (VOLTAREN) 1 % Gel Apply 2 g topically 4 (four) times daily.     Family History       Problem Relation (Age of Onset)    Cirrhosis Father    Drug abuse Mother, Father    Hepatitis Mother, Father    Liver cancer Mother          Tobacco Use    Smoking status: Some Days     Packs/day: 0.50     Years: 23.00     Pack years: 11.50     Types: Cigarettes    Smokeless tobacco: Never    Tobacco comments:     patient states she knows she nees to quit.   Substance and Sexual Activity    Alcohol use: Not Currently     Comment: quit 2014    Drug use: Yes     Frequency: 4.0 times per week     Types: Marijuana     Comment: Patient denies needle use, only inhalation of methampehtamines or orally.  Last known drug use was prior to admission  to hospital stay for surgery    Sexual activity: Yes     Partners: Male     Birth control/protection: None     Review of Systems   Unable to perform ROS: Acuity of condition   Objective:     Vital Signs (Most Recent):  Temp: 97.6 °F (36.4 °C) (12/26/22 0717)  Pulse: 78 (12/26/22 0600)  Resp: (!) 21 (12/26/22 0600)  BP: 112/66 (12/26/22 0600)  SpO2: 97 % (12/26/22 0600)   Vital Signs (24h Range):  Temp:  [97.5 °F (36.4 °C)-97.8 °F (36.6 °C)] 97.6 °F (36.4 °C)  Pulse:  [74-90] 78  Resp:  [13-27] 21  SpO2:  [94 %-100 %] 97 %  BP: ()/(52-79) 112/66     Weight: 97.5 kg (215 lb)  Body mass index is 33.67 kg/m².    Physical Exam  Vitals and nursing note reviewed.   Constitutional:       General: She is not in acute distress.     Appearance: She is obese. She is ill-appearing.   HENT:      Head: Normocephalic and atraumatic.      Nose: Nose normal. No congestion or rhinorrhea.      Mouth/Throat:      Mouth: Mucous membranes are dry.      Pharynx: No oropharyngeal exudate.   Eyes:      General: No scleral icterus.  Neck:      Vascular: No carotid bruit.   Cardiovascular:      Rate and Rhythm: Regular rhythm. Tachycardia present.      Heart sounds: Murmur heard.     No friction rub. No gallop.   Pulmonary:      Effort: No respiratory distress.      Breath sounds: No stridor. No wheezing, rhonchi or rales.   Chest:      Chest wall: No tenderness.   Abdominal:      General: There is no distension.      Palpations: There is no mass.      Tenderness: There is no abdominal tenderness. There is no right CVA tenderness, left CVA tenderness, guarding or rebound.      Hernia: No hernia is present.   Musculoskeletal:         General: No swelling, tenderness or deformity.      Cervical back: Neck supple. No rigidity or tenderness.      Right lower leg: Edema present.      Left lower leg: Edema present.   Lymphadenopathy:      Cervical: No cervical adenopathy.   Skin:     Capillary Refill: Capillary refill takes less than 2  seconds.      Coloration: Skin is not jaundiced or pale.   Neurological:      Cranial Nerves: No cranial nerve deficit.      Sensory: Sensory deficit present.      Motor: Weakness present.   Psychiatric:      Comments: Calm, cooperative, moving all extremities.           Significant Labs: CBC:   Recent Labs   Lab 12/25/22  0351 12/26/22  0334   WBC 1.41* 1.47*   HGB 7.7* 8.1*   HCT 24.2* 25.6*   PLT 47* 52*       CMP:   Recent Labs   Lab 12/25/22  0351 12/26/22  0334    138   K 3.8 3.8    108   CO2 27 28    123*   BUN 12 10   CREATININE 0.5 0.5   CALCIUM 7.2* 7.1*   PROT 6.4  --    ALBUMIN 1.8*  --    BILITOT 1.2*  --    ALKPHOS 111  --    AST 31  --    ALT 15  --    ANIONGAP 3* 2*       Urine Studies:   No results for input(s): COLORU, APPEARANCEUA, PHUR, SPECGRAV, PROTEINUA, GLUCUA, KETONESU, BILIRUBINUA, OCCULTUA, NITRITE, UROBILINOGEN, LEUKOCYTESUR, RBCUA, WBCUA, BACTERIA, SQUAMEPITHEL, HYALINECASTS in the last 48 hours.    Invalid input(s): WRIGHTSUR      Significant Imaging: I have reviewed all pertinent imaging results/findings within the past 24 hours.      Assessment/Plan:      * Severe sepsis  This patient does have evidence of infective focus  My overall impression is sepsis. Vital signs were reviewed and noted in progress note.  Antibiotics given-   Antibiotics (From admission, onward)    Start     Stop Route Frequency Ordered    12/23/22 1245  piperacillin-tazobactam (ZOSYN) 4.5 g in dextrose 5 % in water (D5W) 5 % 100 mL IVPB (MB+)         -- IV Every 8 hours (non-standard times) 12/23/22 0647    12/23/22 0945  mupirocin 2 % ointment         12/28 0859 Nasl 2 times daily 12/23/22 0839    12/23/22 0533  vancomycin (VANCOCIN) 1,000 mg injection        Note to Pharmacy: Created by cabinet override    12/23 1744   12/23/22 0533    12/23/22 0405  piperacillin-tazobactam (ZOSYN) 4.5 gram injection        Note to Pharmacy: Created by cabinet override    12/23 1413   12/23/22 9896         Cultures were taken-   Microbiology Results (last 7 days)     Procedure Component Value Units Date/Time    Urine culture [[id number]] Collected: 12/23/22 0501    Order Status: Completed Specimen: Urine Updated: 12/26/22 0911     Urine Culture, Routine Further report to follow    Narrative:      Preferred Collection Type->Urine, Clean Catch  Specimen Source->Urine    Blood culture #2 **CANNOT BE ORDERED STAT** [[id number]] Collected: 12/23/22 0257    Order Status: Completed Specimen: Blood Updated: 12/25/22 2022     Blood Culture, Routine No Growth to date      No Growth to date      No Growth to date    Blood culture #1 **CANNOT BE ORDERED STAT** [[id number]]  (Abnormal) Collected: 12/23/22 0258    Order Status: Completed Specimen: Blood Updated: 12/25/22 0946     Blood Culture, Routine Gram stain aer bottle: Gram negative rods      Gram stain merissa bottle: Gram negative rods      Results called to and read back by:Sari Deng RN 12/24/2022 05:57      ESCHERICHIA COLI  Susceptibility pending          Latest lactate reviewed, they are-  No results for input(s): LACTATE in the last 72 hours.    Organ dysfunction indicated by Acute kidney injury, Encephalopathy  and Acute liver injury  Source- ?    Source control Achieved by- see orders      Pulmonary hypertension  Found incidentally on echocardiogram with evidence of elevated right sided pressures and dilated heart chambers on the right side. Unclear the exact etiology or class of Pulm HTN but given her significant substance abuse Class 1 certainly is a possibility.   - Will refer to outpatient pulmonologist.   - Will consider a cariology consult while inpatient to see if they think it would be beneficial to have a heart cath done while inpatient      Murmur, cardiac  No evidence of vegetations    Mild episode of recurrent major depressive disorder    Patient with altered mental status holding antidepressants for now      Cannabis use disorder, moderate,  dependence  As above      Amphetamine use disorder, severe  Continue to  educate and support      Spinal stenosis at L4-L5 level        Substance use disorder        Chronic hepatitis C with cirrhosis  Labs noted      Cirrhosis of liver without ascites  Labs noted      UTI (urinary tract infection)  On abx, ? Source.  Suspect source of sepsis.      VTE Risk Mitigation (From admission, onward)         Ordered     enoxaparin injection 40 mg  Daily         12/26/22 0922     IP VTE HIGH RISK PATIENT  Once         12/23/22 0647     Place sequential compression device  Until discontinued         12/23/22 0647                Discharge Planning   SHIRA:      Code Status: Full Code   Is the patient medically ready for discharge?:     Reason for patient still in hospital (select all that apply): Patient trending condition  Discharge Plan A: Home with family                  Sai Red III, MD  Department of Hospital Medicine   [place] - Intensive [facility]

## 2022-12-26 NOTE — EICU
Intervention Initiated From:  COR / EICU    Patrick intervened regarding:  Rounding (Video assessment)    Nurse Notified:  No    Doctor Notified:  No    Comments: Pt on RA sats in 90's.  VSS. Pt resting with eyes closed.  Precedex infusing.  No family at bedside.  Pt had no requests or complaints at this time.  NAD observed.

## 2022-12-26 NOTE — PT/OT/SLP EVAL
Occupational Therapy   Evaluation    Name: Rachel Zazueta  MRN: [id number]  Admitting Diagnosis: Severe sepsis  Recent Surgery: * No surgery found *      Recommendations:     Discharge Recommendations: other (see comments) (To be further determined based on progress prior to discharge.)  Discharge Equipment Recommendations:  other (see comments) (To be further determined based on progress prior to discharge.)  Barriers to discharge:  Other (Comment) (Medical and functional status)    Assessment:     Rachel Zazueta is a 42 y.o. female with a medical diagnosis of Severe sepsis.  She presents with functional deficits impacting independence with ADL's including functional mobility. Performance deficits affecting function: weakness, impaired endurance, impaired sensation, impaired self care skills, impaired functional mobility, impaired balance, decreased safety awareness, pain.      Rehab Prognosis: Good; patient would benefit from acute skilled OT services to address these deficits and reach maximum level of function.       Plan:     Patient to be seen 5 x/week to address the above listed problems via self-care/home management, therapeutic activities, therapeutic exercises, sensory integration  Plan of Care Expires: 01/02/23  Plan of Care Reviewed with: patient    Subjective     Chief Complaint: pain and weakness  Patient/Family Comments/goals: Pt would like to regain independence in order to return home with her boyfriend.     Occupational Profile:  Living Environment: Pt lives with her boyfriend in a mobile home with 5 steps and bilateral handrails to enter / exit.   Previous level of function: Independent  Roles and Routines: ADL's and IADL's  Equipment Used at Home: rollator (RW and BSC)  Assistance upon Discharge: Pt's boyfriend can assist when available.     Pain/Comfort:  Pain Rating 1: 8/10  Location - Orientation 1: lower  Location 1: back  Pain Addressed 1: Reposition, Cessation of Activity, Pre-medicate  for activity, Nurse notified  Pain Rating Post-Intervention 1: 5/10    Patients cultural, spiritual, Religion conflicts given the current situation: no    Objective:     Communicated with: nurse prior to session.  Patient found HOB elevated with telemetry, pulse ox (continuous), peripheral IV, gu catheter, blood pressure cuff upon OT entry to room.    General Precautions: Standard, fall  Orthopedic Precautions: N/A  Braces: N/A  Respiratory Status: Room air    Occupational Performance:    Bed Mobility:    Patient completed Rolling/Turning to Left with  moderate assistance  Patient completed Rolling/Turning to Right with moderate assistance  Patient completed Scooting/Bridging with maximal assistance  Patient completed Supine to Sit with maximal assistance  Patient completed Sit to Supine with maximal assistance    Functional Mobility/Transfers:  Patient completed Sit <> Stand Transfer with moderate assistance  with  rolling walker   Patient completed Bed <> Chair Transfer using Step Transfer technique with maximal assistance with rolling walker  Patient completed Toilet Transfer Step Transfer technique with maximal assistance with  bedside commode    Activities of Daily Living:  Feeding:  modified independence    Grooming: setup assistance    Bathing: moderate assistance    Upper Body Dressing: moderate assistance    Lower Body Dressing: maximal assistance    Toileting: maximal assistance      Cognitive/Visual Perceptual:  Cognitive/Psychosocial Skills:  -       Oriented to: Person, Place, and Situation   -       Follows Commands/attention:Follows multistep  commands  -       Communication: clear/fluent  -       Memory: No Deficits noted  -       Safety awareness/insight to disability: impaired   -       Mood/Affect/Coping skills/emotional control: Cooperative    Physical Exam:  Postural examination/scapula alignment: -       Rounded shoulders  Sensation: -       Impaired  light/touch intermittent numbness as  per report  Dominant hand: -       Right  Upper Extremity Range of Motion:  -       Right Upper Extremity: WFL  -       Left Upper Extremity: WFL  Upper Extremity Strength: -       Right Upper Extremity: Deficits: 3+ / 5  -       Left Upper Extremity: Deficits: 3+ / 5  Fine Motor Coordination: -       Intact  Left hand, manipulation of objects and Right hand, manipulation of objects  Gross motor coordination: WFL    AMPAC 6 Click ADL:  AMPAC Total Score: 16    Treatment & Education:  Pt was provided education / instruction regarding role of OT and established OT POC.    Patient left HOB elevated with all lines intact, call button in reach, and nurse notified    GOALS:   Goals to be met by: 01/02/22     Patient will increase functional independence with ADLs by performing:    UE Dressing with Set-up Assistance.  LE Dressing with Minimal Assistance.  Grooming while seated with Modified [place].  Toileting from toilet with Supervision for hygiene and clothing management.   Bathing from  shower chair/bench with Minimal Assistance.  Step transfer with Minimal Assistance  Toilet transfer to toilet with Minimal Assistance.  Increased functional strength to 4/5 for bilateral UE's.      History:     Past Medical History:   Diagnosis Date    Anemia     Anxiety     Depressed     Diskitis     Hep C w/o coma, chronic     IV drug abuse     Kidney stone     Liver cirrhosis          Past Surgical History:   Procedure Laterality Date    ARTHROTOMY OF SHOULDER Left 11/15/2022    Procedure: ARTHROTOMY, SHOULDER;  Surgeon: Bjorn Hayes MD;  Location: [facility];  Service: Orthopedics;  Laterality: Left;  Left acromioclavicular joint arthrotomy    BACK SURGERY      benine tumor removal      forehead, age 9    CHOLECYSTECTOMY      KIDNEY SURGERY      LUMBAR FUSION Bilateral 3/2/2022    Procedure: FUSION, SPINE, LUMBAR;  Surgeon: Guille Templeton MD;  Location: [address];  Service: Neurosurgery;  Laterality: Bilateral;   AIRO  T10--Pelvis    REMOVAL OF HARDWARE FROM SPINE N/A 2/21/2022    Procedure: REMOVAL, HARDWARE, SPINE;  Surgeon: Guille Templeton MD;  Location: [facility] OR [address] [place] [address];  Service: Neurosurgery;  Laterality: N/A;  [place]    SPINE SURGERY         Time Tracking:     OT Date of Treatment: 12/26/22  OT Start Time: 1620  OT Stop Time: 1700  OT Total Time (min): 40 min    Billable Minutes:Evaluation 40    12/26/2022

## 2022-12-26 NOTE — PT/OT/SLP EVAL
Physical Therapy Evaluation    Patient Name:  Rachel Zazueta   MRN:  [id number]    Recommendations:     Discharge Recommendations:  (to be determined)   Discharge Equipment Recommendations:  (TBD)   Barriers to discharge:  current medical and functional status    Assessment:     Rachel Zazueta is a 42 y.o. female admitted with a medical diagnosis of Severe sepsis.  She presents with the following impairments/functional limitations: weakness, impaired endurance, impaired functional mobility, impaired balance, decreased coordination, decreased lower extremity function, pain, decreased ROM Patient attempting to participate but limited by pain and weakness. .    Rehab Prognosis: Fair; patient would benefit from acute skilled PT services to address these deficits and reach maximum level of function.    Recent Surgery: * No surgery found *      Plan:     During this hospitalization, patient to be seen 5 x/week to address the identified rehab impairments via gait training, therapeutic activities, therapeutic exercises and progress toward the following goals:    Plan of Care Expires:  01/09/23    Subjective     Chief Complaint: Generalized weakness but especially Les (L) greater than (R) and LBP  Patient/Family Comments/goals: to return home to [facility]  Pain/Comfort:  Pain Rating 1:  (unable to rate LBP however patient crying out in pain with attempts at mobility.)  Pain Addressed 1: Distraction, Reposition, Cessation of Activity, Nurse notified  Pain Rating Post-Intervention 1: other (see comments) (decreased complaints with patient supine in bed; bed adjusted for patient comfort)    Patients cultural, spiritual, Congregation conflicts given the current situation: no    Living Environment:  Lives with significant other. Patient with previous back surgery and was wearing back brace last time this therapist treated patient in outpatient therapy but she reports that back brace has since been discontinued. Steps to enter home  per report.   Prior to admission, patients level of function was modified independent.  Equipment used at home: rollator.  Upon discharge, patient will have assistance from significant other.    Objective:     Communicated with nurse prior to session.  Patient found HOB elevated with gu catheter, peripheral IV, telemetry  upon PT entry to room.    General Precautions: Standard, fall  Orthopedic Precautions: (history of back surgery)   Braces: N/A  Respiratory Status: Room air    Exams:  RLE ROM: WFL  RLE Strength: Deficits: 3-/5  LLE ROM: WFL  LLE Strength: Deficits: 3-/5    Functional Mobility:    Bed Mobility:     Rolling Left:  maximal assistance  Rolling Right: maximal assistance  Scooting: maximal assistance and of 2 persons  Supine to Sit: maximal assistance and of 2 persons  Sit to Supine: maximal assistance and of 2 persons  Transfers:     Sit to Stand:  Max assist for sit to stand attempt with patient attempting assistance but unable to lift buttocks from bed. Patient with little activation from (L) LE with attempt to stand. Patient also fearful of falling.   Gait: unable at this time  Balance: sitting balance Min assist    AM-PAC 6 CLICK MOBILITY  Total Score:8       Patient left HOB elevated with all lines intact, call button in reach, and nurse notified.    GOALS:   Multidisciplinary Problems       Physical Therapy Goals          Problem: Physical Therapy    Goal Priority Disciplines Outcome Goal Variances Interventions   Physical Therapy Goal     PT, PT/OT      Description: Goals to be met by: 2023     Patient will increase functional independence with mobility by performin. Supine to sit with Modified [place]  2. Sit to supine with Modified [place]  3. Sit to stand transfer with Stand-by Assistance  4. Bed to chair transfer with Stand-by Assistance using Rolling Walker  5. Gait  x 50 feet with Contact Guard Assistance using Rolling Walker.   6. Sitting at edge of bed x10  minutes with [place] to perform (B) LE therex                           History:     Past Medical History:   Diagnosis Date    Anemia     Anxiety     Depressed     Diskitis     Hep C w/o coma, chronic     IV drug abuse     Kidney stone     Liver cirrhosis        Past Surgical History:   Procedure Laterality Date    ARTHROTOMY OF SHOULDER Left 11/15/2022    Procedure: ARTHROTOMY, SHOULDER;  Surgeon: Bjorn Hayes MD;  Location: [facility];  Service: Orthopedics;  Laterality: Left;  Left acromioclavicular joint arthrotomy    BACK SURGERY      benine tumor removal      forehead, age 9    CHOLECYSTECTOMY      KIDNEY SURGERY      LUMBAR FUSION Bilateral 3/2/2022    Procedure: FUSION, SPINE, LUMBAR;  Surgeon: Guille Templeton MD;  Location: [address];  Service: Neurosurgery;  Laterality: Bilateral;  [place]  T10--Pelvis    REMOVAL OF HARDWARE FROM SPINE N/A 2/21/2022    Procedure: REMOVAL, HARDWARE, SPINE;  Surgeon: Guille Templeton MD;  Location: [facility] OR [address] [place] [address];  Service: Neurosurgery;  Laterality: N/A;  [place]    SPINE SURGERY         Time Tracking:     PT Received On: 12/26/22  PT Start Time: 1058     PT Stop Time: 1121  PT Total Time (min): 23 min     Billable Minutes: Evaluation 23 12/26/2022

## 2022-12-26 NOTE — SUBJECTIVE & OBJECTIVE
Past Medical History:   Diagnosis Date    Anemia     Anxiety     Depressed     Diskitis     Hep C w/o coma, chronic     IV drug abuse     Kidney stone     Liver cirrhosis        Past Surgical History:   Procedure Laterality Date    ARTHROTOMY OF SHOULDER Left 11/15/2022    Procedure: ARTHROTOMY, SHOULDER;  Surgeon: Bjorn Hayes MD;  Location: [facility];  Service: Orthopedics;  Laterality: Left;  Left acromioclavicular joint arthrotomy    BACK SURGERY      benine tumor removal      forehead, age 9    CHOLECYSTECTOMY      KIDNEY SURGERY      LUMBAR FUSION Bilateral 3/2/2022    Procedure: FUSION, SPINE, LUMBAR;  Surgeon: Guille Templeton MD;  Location: [address];  Service: Neurosurgery;  Laterality: Bilateral;  [place]  T10--Pelvis    REMOVAL OF HARDWARE FROM SPINE N/A 2/21/2022    Procedure: REMOVAL, HARDWARE, SPINE;  Surgeon: Guille Templeton MD;  Location: [address];  Service: Neurosurgery;  Laterality: N/A;  [place]    SPINE SURGERY         Review of patient's allergies indicates:   Allergen Reactions    Bee venom protein (honey bee) Anaphylaxis     Patient reports she is allergic to bee stings.    Naproxen Anaphylaxis     Throat closing    12-23- Patient reports taking Ibuprofen 200 mg at home without problems. Verified X3. [place]    Wasp sting [allergen ext-venom-honey bee] Anaphylaxis    Adhesive Blisters    Iodine and iodide containing products Hives     Allergic to iodine in seafood only    Shellfish containing products     Nuts [tree nut] Rash       No current facility-administered medications on file prior to encounter.     Current Outpatient Medications on File Prior to Encounter   Medication Sig    acetaminophen (TYLENOL) 325 MG tablet Take 2 tablets (650 mg total) by mouth every 6 (six) hours as needed for Temperature greater than (100.4 F).    albuterol (ACCUNEB) 1.25 mg/3 mL Nebu Take 3 mLs (1.25 mg total) by nebulization every 6 (six) hours as needed (shortness of breath). Rescue    amoxicillin  (AMOXIL) 500 MG capsule Take 2 capsules (1,000 mg total) by mouth every 12 (twelve) hours.    buPROPion (WELLBUTRIN) 100 MG tablet Take 1 tablet (100 mg total) by mouth 2 (two) times daily.    ciprofloxacin HCl (CIPRO) 500 MG tablet Take 500 mg by mouth 2 (two) times daily.    folic acid (FOLVITE) 1 MG tablet Take 1 tablet (1 mg total) by mouth once daily. (Patient not taking: Reported on 11/25/2022)    pantoprazole (PROTONIX) 40 MG tablet Take 1 tablet (40 mg total) by mouth once daily.    pregabalin (LYRICA) 75 MG capsule Take 1 capsule (75 mg total) by mouth 3 (three) times daily.    sulfamethoxazole-trimethoprim 800-160mg (BACTRIM DS) 800-160 mg Tab Take 2 tablets by mouth 2 (two) times daily.    [DISCONTINUED] diclofenac sodium (VOLTAREN) 1 % Gel Apply 2 g topically 4 (four) times daily.     Family History       Problem Relation (Age of Onset)    Cirrhosis Father    Drug abuse Mother, Father    Hepatitis Mother, Father    Liver cancer Mother          Tobacco Use    Smoking status: Some Days     Packs/day: 0.50     Years: 23.00     Pack years: 11.50     Types: Cigarettes    Smokeless tobacco: Never    Tobacco comments:     patient states she knows she nees to quit.   Substance and Sexual Activity    Alcohol use: Not Currently     Comment: quit 2014    Drug use: Yes     Frequency: 4.0 times per week     Types: Marijuana     Comment: Patient denies needle use, only inhalation of methampehtamines or orally.  Last known drug use was prior to admission to hospital stay for surgery    Sexual activity: Yes     Partners: Male     Birth control/protection: None     Review of Systems   Unable to perform ROS: Acuity of condition   Objective:     Vital Signs (Most Recent):  Temp: 97.6 °F (36.4 °C) (12/26/22 0717)  Pulse: 78 (12/26/22 0600)  Resp: (!) 21 (12/26/22 0600)  BP: 112/66 (12/26/22 0600)  SpO2: 97 % (12/26/22 0600)   Vital Signs (24h Range):  Temp:  [97.5 °F (36.4 °C)-97.8 °F (36.6 °C)] 97.6 °F (36.4 °C)  Pulse:   [74-90] 78  Resp:  [13-27] 21  SpO2:  [94 %-100 %] 97 %  BP: ()/(52-79) 112/66     Weight: 97.5 kg (215 lb)  Body mass index is 33.67 kg/m².    Physical Exam  Vitals and nursing note reviewed.   Constitutional:       General: She is not in acute distress.     Appearance: She is obese. She is ill-appearing.   HENT:      Head: Normocephalic and atraumatic.      Nose: Nose normal. No congestion or rhinorrhea.      Mouth/Throat:      Mouth: Mucous membranes are dry.      Pharynx: No oropharyngeal exudate.   Eyes:      General: No scleral icterus.  Neck:      Vascular: No carotid bruit.   Cardiovascular:      Rate and Rhythm: Regular rhythm. Tachycardia present.      Heart sounds: Murmur heard.     No friction rub. No gallop.   Pulmonary:      Effort: No respiratory distress.      Breath sounds: No stridor. No wheezing, rhonchi or rales.   Chest:      Chest wall: No tenderness.   Abdominal:      General: There is no distension.      Palpations: There is no mass.      Tenderness: There is no abdominal tenderness. There is no right CVA tenderness, left CVA tenderness, guarding or rebound.      Hernia: No hernia is present.   Musculoskeletal:         General: No swelling, tenderness or deformity.      Cervical back: Neck supple. No rigidity or tenderness.      Right lower leg: Edema present.      Left lower leg: Edema present.   Lymphadenopathy:      Cervical: No cervical adenopathy.   Skin:     Capillary Refill: Capillary refill takes less than 2 seconds.      Coloration: Skin is not jaundiced or pale.   Neurological:      Cranial Nerves: No cranial nerve deficit.      Sensory: Sensory deficit present.      Motor: Weakness present.   Psychiatric:      Comments: Calm, cooperative, moving all extremities.           Significant Labs: CBC:   Recent Labs   Lab 12/25/22  0351 12/26/22  0334   WBC 1.41* 1.47*   HGB 7.7* 8.1*   HCT 24.2* 25.6*   PLT 47* 52*       CMP:   Recent Labs   Lab 12/25/22  0351 12/26/22  0334   NA  140 138   K 3.8 3.8    108   CO2 27 28    123*   BUN 12 10   CREATININE 0.5 0.5   CALCIUM 7.2* 7.1*   PROT 6.4  --    ALBUMIN 1.8*  --    BILITOT 1.2*  --    ALKPHOS 111  --    AST 31  --    ALT 15  --    ANIONGAP 3* 2*       Urine Studies:   No results for input(s): COLORU, APPEARANCEUA, PHUR, SPECGRAV, PROTEINUA, GLUCUA, KETONESU, BILIRUBINUA, OCCULTUA, NITRITE, UROBILINOGEN, LEUKOCYTESUR, RBCUA, WBCUA, BACTERIA, SQUAMEPITHEL, HYALINECASTS in the last 48 hours.    Invalid input(s): TREY      Significant Imaging: I have reviewed all pertinent imaging results/findings within the past 24 hours.

## 2022-12-27 LAB
ALBUMIN SERPL BCP-MCNC: 1.6 G/DL (ref 3.5–5.2)
ALP SERPL-CCNC: 115 U/L (ref 55–135)
ALT SERPL W/O P-5'-P-CCNC: 14 U/L (ref 10–44)
ANION GAP SERPL CALC-SCNC: 3 MMOL/L (ref 8–16)
AST SERPL-CCNC: 29 U/L (ref 10–40)
BASOPHILS # BLD AUTO: 0.02 K/UL (ref 0–0.2)
BASOPHILS NFR BLD: 1 % (ref 0–1.9)
BILIRUB SERPL-MCNC: 0.9 MG/DL (ref 0.1–1)
BUN SERPL-MCNC: 6 MG/DL (ref 6–20)
CALCIUM SERPL-MCNC: 7.2 MG/DL (ref 8.7–10.5)
CHLORIDE SERPL-SCNC: 107 MMOL/L (ref 95–110)
CO2 SERPL-SCNC: 29 MMOL/L (ref 23–29)
CREAT SERPL-MCNC: 0.5 MG/DL (ref 0.5–1.4)
DIFFERENTIAL METHOD: ABNORMAL
EOSINOPHIL # BLD AUTO: 0.1 K/UL (ref 0–0.5)
EOSINOPHIL NFR BLD: 2.5 % (ref 0–8)
ERYTHROCYTE [DISTWIDTH] IN BLOOD BY AUTOMATED COUNT: 16.5 % (ref 11.5–14.5)
EST. GFR  (NO RACE VARIABLE): >60 ML/MIN/1.73 M^2
GLUCOSE SERPL-MCNC: 113 MG/DL (ref 70–110)
HCT VFR BLD AUTO: 24.7 % (ref 37–48.5)
HGB BLD-MCNC: 8 G/DL (ref 12–16)
IMM GRANULOCYTES # BLD AUTO: 0.01 K/UL (ref 0–0.04)
IMM GRANULOCYTES NFR BLD AUTO: 0.5 % (ref 0–0.5)
LYMPHOCYTES # BLD AUTO: 0.4 K/UL (ref 1–4.8)
LYMPHOCYTES NFR BLD: 19.8 % (ref 18–48)
MAGNESIUM SERPL-MCNC: 1.6 MG/DL (ref 1.6–2.6)
MCH RBC QN AUTO: 28.2 PG (ref 27–31)
MCHC RBC AUTO-ENTMCNC: 32.4 G/DL (ref 32–36)
MCV RBC AUTO: 87 FL (ref 82–98)
MONOCYTES # BLD AUTO: 0.2 K/UL (ref 0.3–1)
MONOCYTES NFR BLD: 9.6 % (ref 4–15)
NEUTROPHILS # BLD AUTO: 1.3 K/UL (ref 1.8–7.7)
NEUTROPHILS NFR BLD: 66.6 % (ref 38–73)
NRBC BLD-RTO: 0 /100 WBC
PLATELET # BLD AUTO: 52 K/UL (ref 150–450)
PMV BLD AUTO: 9.9 FL (ref 9.2–12.9)
POTASSIUM SERPL-SCNC: 3.6 MMOL/L (ref 3.5–5.1)
PROT SERPL-MCNC: 6.4 G/DL (ref 6–8.4)
RBC # BLD AUTO: 2.84 M/UL (ref 4–5.4)
SODIUM SERPL-SCNC: 139 MMOL/L (ref 136–145)
WBC # BLD AUTO: 1.97 K/UL (ref 3.9–12.7)

## 2022-12-27 PROCEDURE — 63600175 PHARM REV CODE 636 W HCPCS: Performed by: EMERGENCY MEDICINE

## 2022-12-27 PROCEDURE — 25000003 PHARM REV CODE 250: Performed by: INTERNAL MEDICINE

## 2022-12-27 PROCEDURE — 27000207 HC ISOLATION

## 2022-12-27 PROCEDURE — 85025 COMPLETE CBC W/AUTO DIFF WBC: CPT | Performed by: INTERNAL MEDICINE

## 2022-12-27 PROCEDURE — A4216 STERILE WATER/SALINE, 10 ML: HCPCS | Performed by: INTERNAL MEDICINE

## 2022-12-27 PROCEDURE — 97530 THERAPEUTIC ACTIVITIES: CPT

## 2022-12-27 PROCEDURE — 20000000 HC ICU ROOM

## 2022-12-27 PROCEDURE — 36415 COLL VENOUS BLD VENIPUNCTURE: CPT | Performed by: INTERNAL MEDICINE

## 2022-12-27 PROCEDURE — 25000003 PHARM REV CODE 250: Performed by: EMERGENCY MEDICINE

## 2022-12-27 PROCEDURE — 80053 COMPREHEN METABOLIC PANEL: CPT | Performed by: INTERNAL MEDICINE

## 2022-12-27 PROCEDURE — 63600175 PHARM REV CODE 636 W HCPCS: Performed by: INTERNAL MEDICINE

## 2022-12-27 PROCEDURE — 83735 ASSAY OF MAGNESIUM: CPT | Performed by: INTERNAL MEDICINE

## 2022-12-27 RX ORDER — ADHESIVE BANDAGE
30 BANDAGE TOPICAL DAILY PRN
Status: DISCONTINUED | OUTPATIENT
Start: 2022-12-27 | End: 2023-01-19 | Stop reason: HOSPADM

## 2022-12-27 RX ADMIN — MEROPENEM AND SODIUM CHLORIDE 1 G: 1 INJECTION, SOLUTION INTRAVENOUS at 12:12

## 2022-12-27 RX ADMIN — PREGABALIN 75 MG: 75 CAPSULE ORAL at 08:12

## 2022-12-27 RX ADMIN — MUPIROCIN: 20 OINTMENT TOPICAL at 08:12

## 2022-12-27 RX ADMIN — MEROPENEM AND SODIUM CHLORIDE 1 G: 1 INJECTION, SOLUTION INTRAVENOUS at 04:12

## 2022-12-27 RX ADMIN — METHOCARBAMOL TABLETS 500 MG: 500 TABLET, COATED ORAL at 08:12

## 2022-12-27 RX ADMIN — ONDANSETRON HYDROCHLORIDE 4 MG: 2 SOLUTION INTRAMUSCULAR; INTRAVENOUS at 08:12

## 2022-12-27 RX ADMIN — MEROPENEM AND SODIUM CHLORIDE 1 G: 1 INJECTION, SOLUTION INTRAVENOUS at 06:12

## 2022-12-27 RX ADMIN — MORPHINE SULFATE 2 MG: 2 INJECTION, SOLUTION INTRAMUSCULAR; INTRAVENOUS at 08:12

## 2022-12-27 RX ADMIN — MAGNESIUM HYDROXIDE 2400 MG: 400 SUSPENSION ORAL at 06:12

## 2022-12-27 RX ADMIN — HYDROCODONE BITARTRATE AND ACETAMINOPHEN 1 TABLET: 5; 325 TABLET ORAL at 04:12

## 2022-12-27 RX ADMIN — FLUCONAZOLE 200 MG: 100 TABLET ORAL at 08:12

## 2022-12-27 RX ADMIN — MEROPENEM AND SODIUM CHLORIDE 1 G: 1 INJECTION, SOLUTION INTRAVENOUS at 10:12

## 2022-12-27 RX ADMIN — MEROPENEM AND SODIUM CHLORIDE 1 G: 1 INJECTION, SOLUTION INTRAVENOUS at 03:12

## 2022-12-27 RX ADMIN — METHOCARBAMOL TABLETS 500 MG: 500 TABLET, COATED ORAL at 01:12

## 2022-12-27 RX ADMIN — PANTOPRAZOLE SODIUM 40 MG: 40 TABLET, DELAYED RELEASE ORAL at 08:12

## 2022-12-27 RX ADMIN — Medication 10 ML: at 12:12

## 2022-12-27 RX ADMIN — IBUPROFEN 400 MG: 400 TABLET, FILM COATED ORAL at 01:12

## 2022-12-27 RX ADMIN — ENOXAPARIN SODIUM 40 MG: 40 INJECTION SUBCUTANEOUS at 04:12

## 2022-12-27 RX ADMIN — METHOCARBAMOL TABLETS 500 MG: 500 TABLET, COATED ORAL at 04:12

## 2022-12-27 RX ADMIN — Medication 10 ML: at 11:12

## 2022-12-27 RX ADMIN — MEROPENEM AND SODIUM CHLORIDE 1 G: 1 INJECTION, SOLUTION INTRAVENOUS at 07:12

## 2022-12-27 RX ADMIN — MEROPENEM AND SODIUM CHLORIDE 1 G: 1 INJECTION, SOLUTION INTRAVENOUS at 11:12

## 2022-12-27 RX ADMIN — Medication 10 ML: at 06:12

## 2022-12-27 NOTE — ASSESSMENT & PLAN NOTE
This patient does have evidence of infective focus  My overall impression is sepsis. Vital signs were reviewed and noted in progress note.  Antibiotics given-   Antibiotics (From admission, onward)    Start     Stop Route Frequency Ordered    12/26/22 1515  meropenem-0.9% sodium chloride 1 g/50 mL IVPB        See Hyperspace for full Linked Orders Report.    -- IV Every 8 hours (non-standard times) 12/26/22 1409    12/26/22 1500  meropenem-0.9% sodium chloride 1 g/50 mL IVPB        See Hyperspace for full Linked Orders Report.    -- IV Every 8 hours (non-standard times) 12/26/22 1409    12/23/22 0945  mupirocin 2 % ointment         12/28 0859 Nasl 2 times daily 12/23/22 0839    12/23/22 0533  vancomycin (VANCOCIN) 1,000 mg injection        Note to Pharmacy: Created by cabinet override    12/23 1744   12/23/22 0533    12/23/22 0405  piperacillin-tazobactam (ZOSYN) 4.5 gram injection        Note to Pharmacy: Created by cabinet override    12/23 1614   12/23/22 0405        Cultures were taken-   Microbiology Results (last 7 days)     Procedure Component Value Units Date/Time    Blood culture #2 **CANNOT BE ORDERED STAT** [[id number]] Collected: 12/23/22 0257    Order Status: Completed Specimen: Blood Updated: 12/26/22 2022     Blood Culture, Routine No Growth to date      No Growth to date      No Growth to date      No Growth to date    Urine culture [[id number]]  (Abnormal) Collected: 12/23/22 0501    Order Status: Completed Specimen: Urine Updated: 12/26/22 1250     Urine Culture, Routine JACINTA ALBICANS  50,000 - 99,999 cfu/ml  Treatment of asymptomatic candiduria is not recommended (except for   specific populations). Candida isolated in the urine typically   represents colonization. If an indwelling urinary catheter is present  it should be removed or replaced.      Narrative:      Preferred Collection Type->Urine, Clean Catch  Specimen Source->Urine    Blood culture #1 **CANNOT BE ORDERED STAT** [[id number]]   (Abnormal)  (Susceptibility) Collected: 12/23/22 0258    Order Status: Completed Specimen: Blood Updated: 12/26/22 1037     Blood Culture, Routine Gram stain aer bottle: Gram negative rods      Gram stain merissa bottle: Gram negative rods      Results called to and read back by:Sari Deng RN 12/24/2022 05:57      ESCHERICHIA COLI ESBL        Latest lactate reviewed, they are-  No results for input(s): LACTATE in the last 72 hours.    Organ dysfunction indicated by Acute kidney injury, Encephalopathy  and Acute liver injury  Source- ?    Source control Achieved by- see orders

## 2022-12-27 NOTE — EICU
Intervention Initiated From:  COR / KARLA Nguyen intervened regarding:  Rounding (Video assessment)    B/P 129/61, HR 92, RR 24, Sats 94%. CELE  RN @ BS.

## 2022-12-27 NOTE — PT/OT/SLP PROGRESS
Physical Therapy Treatment    Patient Name:  Rachel Zazueta   MRN:  [id number]    Recommendations:     Discharge Recommendations:  (to be determined)  Discharge Equipment Recommendations:  (TBD)  Barriers to discharge:  decreased fxn'l mobility    Assessment:     Rachel Zazueta is a 42 y.o. female admitted with a medical diagnosis of Severe sepsis.  She presents with the following impairments/functional limitations: weakness, impaired endurance, impaired functional mobility, impaired balance, decreased coordination, decreased lower extremity function, pain, decreased ROM     Rehab Prognosis: Fair; patient would benefit from acute skilled PT services to address these deficits and reach maximum level of function.    Recent Surgery: * No surgery found *      Plan:     During this hospitalization, patient to be seen 5 x/week to address the identified rehab impairments via gait training, therapeutic activities, therapeutic exercises and progress toward the following goals:    Plan of Care Expires:  01/09/23    Subjective     Chief Complaint: weakness  Patient/Family Comments/goals: to go to rehab  Pain/Comfort:         Objective:     Communicated with nurse prior to session.  Patient found supine with   upon PT entry to room.     General Precautions: Standard, fall  Orthopedic Precautions:  (history of back surgery)  Braces: N/A  Respiratory Status: Nasal cannula, flow   L/min     Functional Mobility:  Bed Mobility:     Scooting: moderate assistance  Supine to Sit: moderate assistance  Sit to Supine: moderate assistance  Transfers:     Sit to Stand:  moderate assistance with no AD  Bed to Chair: moderate assistance with  hand-held assist  using  Stand Pivot  Gait: pt side stepped along bed with moda with therapist support      AM-PAC 6 CLICK MOBILITY          Treatment & Education:  Pt performed minisquats and side steping along bed with moda    Patient left supine with call button in reach..    GOALS:    Multidisciplinary Problems       Physical Therapy Goals          Problem: Physical Therapy    Goal Priority Disciplines Outcome Goal Variances Interventions   Physical Therapy Goal     PT, PT/OT      Description: Goals to be met by: 2023     Patient will increase functional independence with mobility by performin. Supine to sit with Modified [place]  2. Sit to supine with Modified [place]  3. Sit to stand transfer with Stand-by Assistance  4. Bed to chair transfer with Stand-by Assistance using Rolling Walker  5. Gait  x 50 feet with Contact Guard Assistance using Rolling Walker.   6. Sitting at edge of bed x10 minutes with [place] to perform (B) LE therex                           Time Tracking:     PT Received On:    PT Start Time:       PT Stop Time:    PT Total Time (min):       Billable Minutes: Therapeutic Activity 2022

## 2022-12-27 NOTE — PROGRESS NOTES
"[facility] Medicine  Progress Note    Patient Name: Rachel Zazueta  MRN: [id number]  Patient Class: IP- Inpatient   Admission Date: 12/23/2022  Length of Stay: 4 days  Attending Physician: Hal Barger Jr., MD  Primary Care Provider: Dannielle Merino DO        Subjective:     Principal Problem:Severe sepsis        HPI:  ER HPI:  42-year-old female with a history of anemia, anxiety, recurrent cellulitis, hepatitis C, IVDU, liver cirrhosis, cholecystectomy, kidney stones, lumbar fusion, shoulder surgery, chronic back pain comes in c/o generalized weakness, fatigue, and bilateral flank pain.  She reports that this has been going on intermittently for several days and was diagnosed with a UTI recently.  No fever.  No abdominal pain or vomiting or diarrhea.     IM HPI:  Patient is well known to our service.  Patient has a history of IV drug abuse and unfortunately has relapsed.  Patient has a history of a epidural abscess requiring a large lumbar surgery with multiple rehabilitative stays and therapy.  The patient was progressing to ambulation and recently has declined.  Patient admits to starting drugs including IV drug use again.  Patient presented to the ED with the above symptoms and she has a noticeable cardiac murmur today that we are getting a stat echocardiogram.  Patient's been started on antibiotics fluid resuscitated and seems to have sepsis.  Patient's urinary studies were abnormal but not overwhelming.      Overview/Hospital Course:  12/24/22 CG: Patient remains in the ICU today. Has a lot of muscle spasms and low back pain. Echocardiogram ordered yesterday and completed; significant for pulm HTN but without vegetations.   12/25/22 CG:  Overnight she was having a lot of significant discomfort and pain.  We placed her on Precedex and this has helped significantly with her body aches, her irritability and muscle spasms."  12/26/22 FM:  Patient required Precedex for agitation " and mood changes.  Patient is calm and cooperative this morning.  Patient appears to have E coli sepsis so will taper antibiotics.  Patient's echocardiogram showed no vegetation and E coli would be low risk for metastatic infection.  Will transfer the patient to the floor once her Precedex discontinue we counseled her again today on the importance of avoiding substances and illicit drugs.  This continues to be setback for her.  12/27/22 WC:  Patient overall remains stable and is slowly improving.  Urine culture has revealed Candida albicans in addition to blood culture revealing E coli.      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9    CHOLECYSTECTOMY      KIDNEY SURGERY      LUMBAR FUSION Bilateral 3/2/2022    Procedure: FUSION, SPINE, LUMBAR;  Surgeon: Guille Templeton MD;  Location: [address];  Service: Neurosurgery;  Laterality: Bilateral;  AIRO  T10--Pelvis    REMOVAL OF HARDWARE FROM SPINE N/A 2/21/2022    Procedure: REMOVAL, HARDWARE, SPINE;  Surgeon: Guille Templeton MD;  Location: [address];  Service: Neurosurgery;  Laterality: N/A;  [place]    SPINE SURGERY         Review of patient's allergies indicates:   Allergen Reactions    Bee venom protein (honey bee) Anaphylaxis     Patient reports she is allergic to bee stings.    Naproxen Anaphylaxis     Throat closing    12-23- Patient reports taking Ibuprofen 200 mg at home without problems. Verified X3. [place]    Wasp sting [allergen ext-venom-honey bee] Anaphylaxis    Adhesive  Blisters    Iodine and iodide containing products Hives     Allergic to iodine in seafood only    Shellfish containing products     Nuts [tree nut] Rash       No current facility-administered medications on file prior to encounter.     Current Outpatient Medications on File Prior to Encounter   Medication Sig    acetaminophen (TYLENOL) 325 MG tablet Take 2 tablets (650 mg total) by mouth every 6 (six) hours as needed for Temperature greater than (100.4 F).    albuterol (ACCUNEB) 1.25 mg/3 mL Nebu Take 3 mLs (1.25 mg total) by nebulization every 6 (six) hours as needed (shortness of breath). Rescue    amoxicillin (AMOXIL) 500 MG capsule Take 2 capsules (1,000 mg total) by mouth every 12 (twelve) hours.    buPROPion (WELLBUTRIN) 100 MG tablet Take 1 tablet (100 mg total) by mouth 2 (two) times daily.    ciprofloxacin HCl (CIPRO) 500 MG tablet Take 500 mg by mouth 2 (two) times daily.    folic acid (FOLVITE) 1 MG tablet Take 1 tablet (1 mg total) by mouth once daily. (Patient not taking: Reported on 11/25/2022)    pantoprazole (PROTONIX) 40 MG tablet Take 1 tablet (40 mg total) by mouth once daily.    pregabalin (LYRICA) 75 MG capsule Take 1 capsule (75 mg total) by mouth 3 (three) times daily.    sulfamethoxazole-trimethoprim 800-160mg (BACTRIM DS) 800-160 mg Tab Take 2 tablets by mouth 2 (two) times daily.    [DISCONTINUED] diclofenac sodium (VOLTAREN) 1 % Gel Apply 2 g topically 4 (four) times daily.     Family History       Problem Relation (Age of Onset)    Cirrhosis Father    Drug abuse Mother, Father    Hepatitis Mother, Father    Liver cancer Mother          Tobacco Use    Smoking status: Some Days     Packs/day: 0.50     Years: 23.00     Pack years: 11.50     Types: Cigarettes    Smokeless tobacco: Never    Tobacco comments:     patient states she knows she nees to quit.   Substance and Sexual Activity    Alcohol use: Not Currently     Comment: quit 2014    Drug use: Yes     Frequency: 4.0  times per week     Types: Marijuana     Comment: Patient denies needle use, only inhalation of methampehtamines or orally.  Last known drug use was prior to admission to hospital stay for surgery    Sexual activity: Yes     Partners: Male     Birth control/protection: None     Review of Systems   Unable to perform ROS: Acuity of condition   Objective:     Vital Signs (Most Recent):  Temp: 98.1 °F (36.7 °C) (12/27/22 0400)  Pulse: 90 (12/27/22 0600)  Resp: 16 (12/27/22 0600)  BP: 107/64 (12/27/22 0600)  SpO2: 100 % (12/27/22 0600)   Vital Signs (24h Range):  Temp:  [97.1 °F (36.2 °C)-98.3 °F (36.8 °C)] 98.1 °F (36.7 °C)  Pulse:  [73-94] 90  Resp:  [13-29] 16  SpO2:  [92 %-100 %] 100 %  BP: ()/(54-80) 107/64     Weight: 97.5 kg (215 lb)  Body mass index is 33.67 kg/m².    Physical Exam  Vitals and nursing note reviewed.   Constitutional:       General: She is not in acute distress.     Appearance: She is obese. She is ill-appearing.   HENT:      Head: Normocephalic and atraumatic.      Nose: Nose normal. No congestion or rhinorrhea.      Mouth/Throat:      Mouth: Mucous membranes are dry.      Pharynx: No oropharyngeal exudate.   Eyes:      General: No scleral icterus.  Neck:      Vascular: No carotid bruit.   Cardiovascular:      Rate and Rhythm: Regular rhythm. Tachycardia present.      Heart sounds: Murmur heard.     No friction rub. No gallop.   Pulmonary:      Effort: No respiratory distress.      Breath sounds: No stridor. No wheezing, rhonchi or rales.   Chest:      Chest wall: No tenderness.   Abdominal:      General: There is no distension.      Palpations: There is no mass.      Tenderness: There is no abdominal tenderness. There is no right CVA tenderness, left CVA tenderness, guarding or rebound.      Hernia: No hernia is present.   Musculoskeletal:         General: No swelling, tenderness or deformity.      Cervical back: Neck supple. No rigidity or tenderness.      Right lower leg: Edema present.       Left lower leg: Edema present.   Lymphadenopathy:      Cervical: No cervical adenopathy.   Skin:     Capillary Refill: Capillary refill takes less than 2 seconds.      Coloration: Skin is not jaundiced or pale.   Neurological:      Cranial Nerves: No cranial nerve deficit.      Sensory: Sensory deficit present.      Motor: Weakness present.   Psychiatric:      Comments: Calm, cooperative, moving all extremities.           Significant Labs: CBC:   Recent Labs   Lab 12/26/22  0334 12/27/22  0338   WBC 1.47* 1.97*   HGB 8.1* 8.0*   HCT 25.6* 24.7*   PLT 52* 52*       CMP:   Recent Labs   Lab 12/26/22  0334 12/27/22  0338    139   K 3.8 3.6    107   CO2 28 29   * 113*   BUN 10 6   CREATININE 0.5 0.5   CALCIUM 7.1* 7.2*   PROT  --  6.4   ALBUMIN  --  1.6*   BILITOT  --  0.9   ALKPHOS  --  115   AST  --  29   ALT  --  14   ANIONGAP 2* 3*       Urine Studies:   No results for input(s): COLORU, APPEARANCEUA, PHUR, SPECGRAV, PROTEINUA, GLUCUA, KETONESU, BILIRUBINUA, OCCULTUA, NITRITE, UROBILINOGEN, LEUKOCYTESUR, RBCUA, WBCUA, BACTERIA, SQUAMEPITHEL, HYALINECASTS in the last 48 hours.    Invalid input(s): WRIGHTSUR      Significant Imaging: I have reviewed all pertinent imaging results/findings within the past 24 hours.      Assessment/Plan:      * Severe sepsis  This patient does have evidence of infective focus  My overall impression is sepsis. Vital signs were reviewed and noted in progress note.  Antibiotics given-   Antibiotics (From admission, onward)    Start     Stop Route Frequency Ordered    12/26/22 1515  meropenem-0.9% sodium chloride 1 g/50 mL IVPB        See Hyperspace for full Linked Orders Report.    -- IV Every 8 hours (non-standard times) 12/26/22 1409    12/26/22 1500  meropenem-0.9% sodium chloride 1 g/50 mL IVPB        See Hyperspace for full Linked Orders Report.    -- IV Every 8 hours (non-standard times) 12/26/22 1409    12/23/22 0945  mupirocin 2 % ointment         12/28 2726  Nasl 2 times daily 12/23/22 0839    12/23/22 0533  vancomycin (VANCOCIN) 1,000 mg injection        Note to Pharmacy: Created by cabinet override    12/23 1744   12/23/22 0533    12/23/22 0405  piperacillin-tazobactam (ZOSYN) 4.5 gram injection        Note to Pharmacy: Created by cabinet override    12/23 1614   12/23/22 0405        Cultures were taken-   Microbiology Results (last 7 days)     Procedure Component Value Units Date/Time    Blood culture #2 **CANNOT BE ORDERED STAT** [[id number]] Collected: 12/23/22 0257    Order Status: Completed Specimen: Blood Updated: 12/26/22 2022     Blood Culture, Routine No Growth to date      No Growth to date      No Growth to date      No Growth to date    Urine culture [[id number]]  (Abnormal) Collected: 12/23/22 0501    Order Status: Completed Specimen: Urine Updated: 12/26/22 1250     Urine Culture, Routine JACINTA ALBICANS  50,000 - 99,999 cfu/ml  Treatment of asymptomatic candiduria is not recommended (except for   specific populations). Candida isolated in the urine typically   represents colonization. If an indwelling urinary catheter is present  it should be removed or replaced.      Narrative:      Preferred Collection Type->Urine, Clean Catch  Specimen Source->Urine    Blood culture #1 **CANNOT BE ORDERED STAT** [[id number]]  (Abnormal)  (Susceptibility) Collected: 12/23/22 0258    Order Status: Completed Specimen: Blood Updated: 12/26/22 1037     Blood Culture, Routine Gram stain aer bottle: Gram negative rods      Gram stain merissa bottle: Gram negative rods      Results called to and read back by:Sari Deng RN 12/24/2022 05:57      ESCHERICHIA COLI ESBL        Latest lactate reviewed, they are-  No results for input(s): LACTATE in the last 72 hours.    Organ dysfunction indicated by Acute kidney injury, Encephalopathy  and Acute liver injury  Source- ?    Source control Achieved by- see orders      Pulmonary hypertension  Found incidentally on echocardiogram  with evidence of elevated right sided pressures and dilated heart chambers on the right side. Unclear the exact etiology or class of Pulm HTN but given her significant substance abuse Class 1 certainly is a possibility.   - Will refer to outpatient pulmonologist.   - Will consider a cariology consult while inpatient to see if they think it would be beneficial to have a heart cath done while inpatient      Murmur, cardiac  No evidence of vegetations    Mild episode of recurrent major depressive disorder  Patient with altered mental status holding antidepressants for now      Cannabis use disorder, moderate, dependence  As above      Amphetamine use disorder, severe  Continue to  educate and support      Spinal stenosis at L4-L5 level        Substance use disorder        Chronic hepatitis C with cirrhosis  Labs noted      Cirrhosis of liver without ascites  Labs noted      UTI (urinary tract infection)  On abx, ? Source.  Suspect source of sepsis.      VTE Risk Mitigation (From admission, onward)         Ordered     enoxaparin injection 40 mg  Daily         12/26/22 0922     IP VTE HIGH RISK PATIENT  Once         12/23/22 0647     Place sequential compression device  Until discontinued         12/23/22 0647                Discharge Planning   SHIRA:      Code Status: Full Code   Is the patient medically ready for discharge?:     Reason for patient still in hospital (select all that apply): Treatment  Discharge Plan A: Home with family                  Hal Barger Jr, MD  Department of Hospital Medicine   [facility]

## 2022-12-27 NOTE — SUBJECTIVE & OBJECTIVE
Past Medical History:   Diagnosis Date    Anemia     Anxiety     Depressed     Diskitis     Hep C w/o coma, chronic     IV drug abuse     Kidney stone     Liver cirrhosis        Past Surgical History:   Procedure Laterality Date    ARTHROTOMY OF SHOULDER Left 11/15/2022    Procedure: ARTHROTOMY, SHOULDER;  Surgeon: Bjorn Hayes MD;  Location: [facility];  Service: Orthopedics;  Laterality: Left;  Left acromioclavicular joint arthrotomy    BACK SURGERY      benine tumor removal      forehead, age 9    CHOLECYSTECTOMY      KIDNEY SURGERY      LUMBAR FUSION Bilateral 3/2/2022    Procedure: FUSION, SPINE, LUMBAR;  Surgeon: Guille Templeton MD;  Location: [address];  Service: Neurosurgery;  Laterality: Bilateral;  AIRO  T10--Pelvis    REMOVAL OF HARDWARE FROM SPINE N/A 2/21/2022    Procedure: REMOVAL, HARDWARE, SPINE;  Surgeon: Guille Templeton MD;  Location: [address];  Service: Neurosurgery;  Laterality: N/A;  [place]    SPINE SURGERY         Review of patient's allergies indicates:   Allergen Reactions    Bee venom protein (honey bee) Anaphylaxis     Patient reports she is allergic to bee stings.    Naproxen Anaphylaxis     Throat closing    12-23- Patient reports taking Ibuprofen 200 mg at home without problems. Verified X3. [place]    Wasp sting [allergen ext-venom-honey bee] Anaphylaxis    Adhesive Blisters    Iodine and iodide containing products Hives     Allergic to iodine in seafood only    Shellfish containing products     Nuts [tree nut] Rash       No current facility-administered medications on file prior to encounter.     Current Outpatient Medications on File Prior to Encounter   Medication Sig    acetaminophen (TYLENOL) 325 MG tablet Take 2 tablets (650 mg total) by mouth every 6 (six) hours as needed for Temperature greater than (100.4 F).    albuterol (ACCUNEB) 1.25 mg/3 mL Nebu Take 3 mLs (1.25 mg total) by nebulization every 6 (six) hours as needed (shortness of breath). Rescue    amoxicillin  (AMOXIL) 500 MG capsule Take 2 capsules (1,000 mg total) by mouth every 12 (twelve) hours.    buPROPion (WELLBUTRIN) 100 MG tablet Take 1 tablet (100 mg total) by mouth 2 (two) times daily.    ciprofloxacin HCl (CIPRO) 500 MG tablet Take 500 mg by mouth 2 (two) times daily.    folic acid (FOLVITE) 1 MG tablet Take 1 tablet (1 mg total) by mouth once daily. (Patient not taking: Reported on 11/25/2022)    pantoprazole (PROTONIX) 40 MG tablet Take 1 tablet (40 mg total) by mouth once daily.    pregabalin (LYRICA) 75 MG capsule Take 1 capsule (75 mg total) by mouth 3 (three) times daily.    sulfamethoxazole-trimethoprim 800-160mg (BACTRIM DS) 800-160 mg Tab Take 2 tablets by mouth 2 (two) times daily.    [DISCONTINUED] diclofenac sodium (VOLTAREN) 1 % Gel Apply 2 g topically 4 (four) times daily.     Family History       Problem Relation (Age of Onset)    Cirrhosis Father    Drug abuse Mother, Father    Hepatitis Mother, Father    Liver cancer Mother          Tobacco Use    Smoking status: Some Days     Packs/day: 0.50     Years: 23.00     Pack years: 11.50     Types: Cigarettes    Smokeless tobacco: Never    Tobacco comments:     patient states she knows she nees to quit.   Substance and Sexual Activity    Alcohol use: Not Currently     Comment: quit 2014    Drug use: Yes     Frequency: 4.0 times per week     Types: Marijuana     Comment: Patient denies needle use, only inhalation of methampehtamines or orally.  Last known drug use was prior to admission to hospital stay for surgery    Sexual activity: Yes     Partners: Male     Birth control/protection: None     Review of Systems   Unable to perform ROS: Acuity of condition   Objective:     Vital Signs (Most Recent):  Temp: 98.1 °F (36.7 °C) (12/27/22 0400)  Pulse: 90 (12/27/22 0600)  Resp: 16 (12/27/22 0600)  BP: 107/64 (12/27/22 0600)  SpO2: 100 % (12/27/22 0600)   Vital Signs (24h Range):  Temp:  [97.1 °F (36.2 °C)-98.3 °F (36.8 °C)] 98.1 °F (36.7 °C)  Pulse:   [73-94] 90  Resp:  [13-29] 16  SpO2:  [92 %-100 %] 100 %  BP: ()/(54-80) 107/64     Weight: 97.5 kg (215 lb)  Body mass index is 33.67 kg/m².    Physical Exam  Vitals and nursing note reviewed.   Constitutional:       General: She is not in acute distress.     Appearance: She is obese. She is ill-appearing.   HENT:      Head: Normocephalic and atraumatic.      Nose: Nose normal. No congestion or rhinorrhea.      Mouth/Throat:      Mouth: Mucous membranes are dry.      Pharynx: No oropharyngeal exudate.   Eyes:      General: No scleral icterus.  Neck:      Vascular: No carotid bruit.   Cardiovascular:      Rate and Rhythm: Regular rhythm. Tachycardia present.      Heart sounds: Murmur heard.     No friction rub. No gallop.   Pulmonary:      Effort: No respiratory distress.      Breath sounds: No stridor. No wheezing, rhonchi or rales.   Chest:      Chest wall: No tenderness.   Abdominal:      General: There is no distension.      Palpations: There is no mass.      Tenderness: There is no abdominal tenderness. There is no right CVA tenderness, left CVA tenderness, guarding or rebound.      Hernia: No hernia is present.   Musculoskeletal:         General: No swelling, tenderness or deformity.      Cervical back: Neck supple. No rigidity or tenderness.      Right lower leg: Edema present.      Left lower leg: Edema present.   Lymphadenopathy:      Cervical: No cervical adenopathy.   Skin:     Capillary Refill: Capillary refill takes less than 2 seconds.      Coloration: Skin is not jaundiced or pale.   Neurological:      Cranial Nerves: No cranial nerve deficit.      Sensory: Sensory deficit present.      Motor: Weakness present.   Psychiatric:      Comments: Calm, cooperative, moving all extremities.           Significant Labs: CBC:   Recent Labs   Lab 12/26/22 0334 12/27/22 0338   WBC 1.47* 1.97*   HGB 8.1* 8.0*   HCT 25.6* 24.7*   PLT 52* 52*       CMP:   Recent Labs   Lab 12/26/22 0334 12/27/22 0338   NA  138 139   K 3.8 3.6    107   CO2 28 29   * 113*   BUN 10 6   CREATININE 0.5 0.5   CALCIUM 7.1* 7.2*   PROT  --  6.4   ALBUMIN  --  1.6*   BILITOT  --  0.9   ALKPHOS  --  115   AST  --  29   ALT  --  14   ANIONGAP 2* 3*       Urine Studies:   No results for input(s): COLORU, APPEARANCEUA, PHUR, SPECGRAV, PROTEINUA, GLUCUA, KETONESU, BILIRUBINUA, OCCULTUA, NITRITE, UROBILINOGEN, LEUKOCYTESUR, RBCUA, WBCUA, BACTERIA, SQUAMEPITHEL, HYALINECASTS in the last 48 hours.    Invalid input(s): TREY      Significant Imaging: I have reviewed all pertinent imaging results/findings within the past 24 hours.

## 2022-12-28 LAB
ALBUMIN SERPL BCP-MCNC: 1.7 G/DL (ref 3.5–5.2)
ALP SERPL-CCNC: 109 U/L (ref 55–135)
ALT SERPL W/O P-5'-P-CCNC: 16 U/L (ref 10–44)
ANION GAP SERPL CALC-SCNC: 0 MMOL/L (ref 8–16)
AST SERPL-CCNC: 29 U/L (ref 10–40)
BACTERIA BLD CULT: NORMAL
BASOPHILS # BLD AUTO: 0.01 K/UL (ref 0–0.2)
BASOPHILS NFR BLD: 0.6 % (ref 0–1.9)
BILIRUB SERPL-MCNC: 1 MG/DL (ref 0.1–1)
BUN SERPL-MCNC: 6 MG/DL (ref 6–20)
CALCIUM SERPL-MCNC: 7.4 MG/DL (ref 8.7–10.5)
CHLORIDE SERPL-SCNC: 108 MMOL/L (ref 95–110)
CO2 SERPL-SCNC: 32 MMOL/L (ref 23–29)
CREAT SERPL-MCNC: 0.4 MG/DL (ref 0.5–1.4)
DIFFERENTIAL METHOD: ABNORMAL
EOSINOPHIL # BLD AUTO: 0 K/UL (ref 0–0.5)
EOSINOPHIL NFR BLD: 2.3 % (ref 0–8)
ERYTHROCYTE [DISTWIDTH] IN BLOOD BY AUTOMATED COUNT: 16.6 % (ref 11.5–14.5)
EST. GFR  (NO RACE VARIABLE): >60 ML/MIN/1.73 M^2
GLUCOSE SERPL-MCNC: 88 MG/DL (ref 70–110)
HCT VFR BLD AUTO: 24.7 % (ref 37–48.5)
HGB BLD-MCNC: 8 G/DL (ref 12–16)
IMM GRANULOCYTES # BLD AUTO: 0.01 K/UL (ref 0–0.04)
IMM GRANULOCYTES NFR BLD AUTO: 0.6 % (ref 0–0.5)
LYMPHOCYTES # BLD AUTO: 0.4 K/UL (ref 1–4.8)
LYMPHOCYTES NFR BLD: 21.3 % (ref 18–48)
MAGNESIUM SERPL-MCNC: 1.8 MG/DL (ref 1.6–2.6)
MCH RBC QN AUTO: 28.3 PG (ref 27–31)
MCHC RBC AUTO-ENTMCNC: 32.4 G/DL (ref 32–36)
MCV RBC AUTO: 87 FL (ref 82–98)
MONOCYTES # BLD AUTO: 0.2 K/UL (ref 0.3–1)
MONOCYTES NFR BLD: 10.9 % (ref 4–15)
NEUTROPHILS # BLD AUTO: 1.1 K/UL (ref 1.8–7.7)
NEUTROPHILS NFR BLD: 64.3 % (ref 38–73)
NRBC BLD-RTO: 0 /100 WBC
PLATELET # BLD AUTO: 56 K/UL (ref 150–450)
PMV BLD AUTO: 9.1 FL (ref 9.2–12.9)
POTASSIUM SERPL-SCNC: 4.1 MMOL/L (ref 3.5–5.1)
PROT SERPL-MCNC: 6.1 G/DL (ref 6–8.4)
RBC # BLD AUTO: 2.83 M/UL (ref 4–5.4)
SODIUM SERPL-SCNC: 140 MMOL/L (ref 136–145)
WBC # BLD AUTO: 1.74 K/UL (ref 3.9–12.7)

## 2022-12-28 PROCEDURE — 97530 THERAPEUTIC ACTIVITIES: CPT

## 2022-12-28 PROCEDURE — 85025 COMPLETE CBC W/AUTO DIFF WBC: CPT | Performed by: INTERNAL MEDICINE

## 2022-12-28 PROCEDURE — 83735 ASSAY OF MAGNESIUM: CPT | Performed by: INTERNAL MEDICINE

## 2022-12-28 PROCEDURE — 25000003 PHARM REV CODE 250: Performed by: INTERNAL MEDICINE

## 2022-12-28 PROCEDURE — 63600175 PHARM REV CODE 636 W HCPCS: Performed by: INTERNAL MEDICINE

## 2022-12-28 PROCEDURE — 27000207 HC ISOLATION

## 2022-12-28 PROCEDURE — 36415 COLL VENOUS BLD VENIPUNCTURE: CPT | Performed by: INTERNAL MEDICINE

## 2022-12-28 PROCEDURE — A4216 STERILE WATER/SALINE, 10 ML: HCPCS | Performed by: INTERNAL MEDICINE

## 2022-12-28 PROCEDURE — 97535 SELF CARE MNGMENT TRAINING: CPT

## 2022-12-28 PROCEDURE — 11000001 HC ACUTE MED/SURG PRIVATE ROOM

## 2022-12-28 PROCEDURE — 87040 BLOOD CULTURE FOR BACTERIA: CPT | Mod: 59 | Performed by: INTERNAL MEDICINE

## 2022-12-28 PROCEDURE — 25000003 PHARM REV CODE 250: Performed by: EMERGENCY MEDICINE

## 2022-12-28 PROCEDURE — 80053 COMPREHEN METABOLIC PANEL: CPT | Performed by: INTERNAL MEDICINE

## 2022-12-28 RX ORDER — POLYETHYLENE GLYCOL 3350 17 G/17G
17 POWDER, FOR SOLUTION ORAL DAILY
Status: DISCONTINUED | OUTPATIENT
Start: 2022-12-29 | End: 2023-01-19 | Stop reason: HOSPADM

## 2022-12-28 RX ORDER — ALPRAZOLAM 0.5 MG/1
0.5 TABLET ORAL 3 TIMES DAILY PRN
Status: DISCONTINUED | OUTPATIENT
Start: 2022-12-28 | End: 2023-01-09

## 2022-12-28 RX ADMIN — MEROPENEM AND SODIUM CHLORIDE 1 G: 1 INJECTION, SOLUTION INTRAVENOUS at 11:12

## 2022-12-28 RX ADMIN — METHOCARBAMOL TABLETS 500 MG: 500 TABLET, COATED ORAL at 12:12

## 2022-12-28 RX ADMIN — HYDROCODONE BITARTRATE AND ACETAMINOPHEN 1 TABLET: 5; 325 TABLET ORAL at 08:12

## 2022-12-28 RX ADMIN — METHOCARBAMOL TABLETS 500 MG: 500 TABLET, COATED ORAL at 09:12

## 2022-12-28 RX ADMIN — MORPHINE SULFATE 2 MG: 2 INJECTION, SOLUTION INTRAMUSCULAR; INTRAVENOUS at 10:12

## 2022-12-28 RX ADMIN — ALPRAZOLAM 0.5 MG: 0.5 TABLET ORAL at 03:12

## 2022-12-28 RX ADMIN — METHOCARBAMOL TABLETS 500 MG: 500 TABLET, COATED ORAL at 04:12

## 2022-12-28 RX ADMIN — MEROPENEM AND SODIUM CHLORIDE 1 G: 1 INJECTION, SOLUTION INTRAVENOUS at 10:12

## 2022-12-28 RX ADMIN — HYDROCODONE BITARTRATE AND ACETAMINOPHEN 1 TABLET: 5; 325 TABLET ORAL at 01:12

## 2022-12-28 RX ADMIN — FLUCONAZOLE 200 MG: 100 TABLET ORAL at 09:12

## 2022-12-28 RX ADMIN — PREGABALIN 75 MG: 75 CAPSULE ORAL at 09:12

## 2022-12-28 RX ADMIN — Medication 10 ML: at 05:12

## 2022-12-28 RX ADMIN — MEROPENEM AND SODIUM CHLORIDE 1 G: 1 INJECTION, SOLUTION INTRAVENOUS at 03:12

## 2022-12-28 RX ADMIN — Medication 10 ML: at 12:12

## 2022-12-28 RX ADMIN — MEROPENEM AND SODIUM CHLORIDE 1 G: 1 INJECTION, SOLUTION INTRAVENOUS at 07:12

## 2022-12-28 RX ADMIN — MORPHINE SULFATE 2 MG: 2 INJECTION, SOLUTION INTRAMUSCULAR; INTRAVENOUS at 04:12

## 2022-12-28 RX ADMIN — MEROPENEM AND SODIUM CHLORIDE 1 G: 1 INJECTION, SOLUTION INTRAVENOUS at 08:12

## 2022-12-28 RX ADMIN — IBUPROFEN 400 MG: 400 TABLET, FILM COATED ORAL at 03:12

## 2022-12-28 RX ADMIN — METHOCARBAMOL TABLETS 500 MG: 500 TABLET, COATED ORAL at 08:12

## 2022-12-28 RX ADMIN — MORPHINE SULFATE 2 MG: 2 INJECTION, SOLUTION INTRAMUSCULAR; INTRAVENOUS at 12:12

## 2022-12-28 RX ADMIN — PANTOPRAZOLE SODIUM 40 MG: 40 TABLET, DELAYED RELEASE ORAL at 09:12

## 2022-12-28 RX ADMIN — Medication 10 ML: at 06:12

## 2022-12-28 RX ADMIN — PREGABALIN 75 MG: 75 CAPSULE ORAL at 08:12

## 2022-12-28 RX ADMIN — HYDROCODONE BITARTRATE AND ACETAMINOPHEN 1 TABLET: 5; 325 TABLET ORAL at 12:12

## 2022-12-28 NOTE — NURSING
Report called to [facility] Med Surg nurse.  Pain medication given urgently for pt transport.  Pt transferred to med surg via wheelchair by House Supervisor and ICU CNA.

## 2022-12-28 NOTE — ASSESSMENT & PLAN NOTE
This patient does have evidence of infective focus  My overall impression is sepsis. Vital signs were reviewed and noted in progress note.  Antibiotics given-   Antibiotics (From admission, onward)    Start     Stop Route Frequency Ordered    12/26/22 1515  meropenem-0.9% sodium chloride 1 g/50 mL IVPB        See Hyperspace for full Linked Orders Report.    -- IV Every 8 hours (non-standard times) 12/26/22 1409    12/26/22 1500  meropenem-0.9% sodium chloride 1 g/50 mL IVPB        See Hyperspace for full Linked Orders Report.    -- IV Every 8 hours (non-standard times) 12/26/22 1409    12/23/22 0533  vancomycin (VANCOCIN) 1,000 mg injection        Note to Pharmacy: Created by cabinet override    12/23 1744   12/23/22 0533    12/23/22 0405  piperacillin-tazobactam (ZOSYN) 4.5 gram injection        Note to Pharmacy: Created by cabinet override    12/23 1614   12/23/22 0405        Cultures were taken-   Microbiology Results (last 7 days)     Procedure Component Value Units Date/Time    Blood culture [[id number]]     Order Status: Sent Specimen: Blood     Blood culture [[id number]]     Order Status: Sent Specimen: Blood     Blood culture #2 **CANNOT BE ORDERED STAT** [[id number]] Collected: 12/23/22 0257    Order Status: Completed Specimen: Blood Updated: 12/27/22 2022     Blood Culture, Routine No Growth to date      No Growth to date      No Growth to date      No Growth to date      No Growth to date    Urine culture [[id number]]  (Abnormal) Collected: 12/23/22 0501    Order Status: Completed Specimen: Urine Updated: 12/26/22 1250     Urine Culture, Routine JACINTA ALBICANS  50,000 - 99,999 cfu/ml  Treatment of asymptomatic candiduria is not recommended (except for   specific populations). Candida isolated in the urine typically   represents colonization. If an indwelling urinary catheter is present  it should be removed or replaced.      Narrative:      Preferred Collection Type->Urine, Clean Catch  Specimen  Source->Urine    Blood culture #1 **CANNOT BE ORDERED STAT** [[id number]]  (Abnormal)  (Susceptibility) Collected: 12/23/22 0258    Order Status: Completed Specimen: Blood Updated: 12/26/22 1037     Blood Culture, Routine Gram stain aer bottle: Gram negative rods      Gram stain merissa bottle: Gram negative rods      Results called to and read back by:Sari Deng RN 12/24/2022 05:57      ESCHERICHIA COLI ESBL        Latest lactate reviewed, they are-  No results for input(s): LACTATE in the last 72 hours.    Organ dysfunction indicated by Acute kidney injury, Encephalopathy  and Acute liver injury  Source- ?    Source control Achieved by- see orders

## 2022-12-28 NOTE — NURSING
Patient is off of the monitor at this time, she is refusing to to be connected because she is hurting. She is experiencing muscle spasms in her back from a known back history. Medication given, talked to the patient and was able to reconnect telemetry. Rubbed her back and applied accu-therm warm pack, patient states it helped.

## 2022-12-28 NOTE — PT/OT/SLP PROGRESS
Occupational Therapy   Treatment    Name: Rachel Zazueta  MRN: [id number]  Admitting Diagnosis:  Severe sepsis       Recommendations:     Discharge Recommendations: other (see comments) (To be further determined based on progress prior to discharge.)  Discharge Equipment Recommendations:  other (see comments) (To be further determined based on progress prior to discharge.)  Barriers to discharge:  Other (Comment) (Medical and functional status)    Assessment:     Rachel Zazueta is a 42 y.o. female with a medical diagnosis of Severe sepsis.  She presents with continued muscle spasms and severe pain. Performance deficits affecting function are weakness, impaired endurance, impaired sensation, impaired self care skills, impaired functional mobility, impaired balance, decreased safety awareness, pain.     Rehab Prognosis:  Fair; patient would benefit from acute skilled OT services to address these deficits and reach maximum level of function.       Plan:     Patient to be seen 5 x/week to address the above listed problems via self-care/home management, therapeutic activities, therapeutic exercises, sensory integration  Plan of Care Expires: 01/02/23  Plan of Care Reviewed with: patient    Subjective     Pain/Comfort:  Pain Rating 1: 10/10  Location - Orientation 1: lower  Location 1: back  Pain Addressed 1: Reposition, Cessation of Activity, Pre-medicate for activity, Distraction, Nurse notified  Pain Rating Post-Intervention 1: 7/10    Objective:     Communicated with: nurse prior to session.  Patient found HOB elevated with telemetry, PureWick upon OT entry to room.    General Precautions: Standard, fall    Orthopedic Precautions:N/A  Braces: N/A  Respiratory Status: Room air     Occupational Performance:     Bed Mobility:    Patient completed Rolling/Turning to Left with  minimum assistance  Patient completed Rolling/Turning to Right with minimum assistance  Patient completed Scooting/Bridging with moderate  assistance  Patient completed Supine to Sit with moderate assistance  Patient completed Sit to Supine with moderate assistance     Functional Mobility/Transfers:  Patient completed Sit <> Stand Transfer with moderate assistance  with  rolling walker   Patient completed Bed <> Chair Transfer using Step Transfer technique with moderate assistance with no assistive device  Patient completed Toilet Transfer Step Transfer technique with moderate assistance with  bedside commode    Activities of Daily Living:  Lower Body Dressing: maximal assistance        [facility] 6 Click ADL:      Treatment & Education:  Pt was cooperative with verbal encouragement while exhibiting severe pain and increased anxiety upon arrival. She was provided continued education / instruction regarding repositioning in bed to facilitate decreased pain and to alleviate / reduce muscle spasms. Pt performed bed mobility requiring min to mod assist secondary to lifting assist and stabilization of trunk with additional assist of (L) LE during supine to sit transition, scooting assist at (L) hip to EOB, and lifting assist of bilateral LE's during sit to supine transition. She then participated in ADL retraining regarding LB dressing emphasizing use of compensatory strategies providing extra time requiring max assist secondary to assist to [place] bilateral socks with continuous verbal and tactile cueing for technique. Also, she participated in functional transfer retraining to / from bed and BSC emphasizing fall prevention providing extra time with repetition requiring mod assist secondary to lifting assist and steadying with continuous verbal and tactile cueing for posture, safety, and technique.    Patient left HOB elevated with all lines intact, call button in reach, and nurse notified    GOALS:   Multidisciplinary Problems       Occupational Therapy Goals          Problem: Occupational Therapy    Goal Priority Disciplines Outcome Interventions   Occupational  Therapy Goal     OT, PT/OT     Description: Goals to be met by: 01/02/22     Patient will increase functional independence with ADLs by performing:    UE Dressing with Set-up Assistance.  LE Dressing with Minimal Assistance.  Grooming while seated with Modified [place].  Toileting from toilet with Supervision for hygiene and clothing management.   Bathing from  shower chair/bench with Minimal Assistance.  Step transfer with Minimal Assistance  Toilet transfer to toilet with Minimal Assistance.  Increased functional strength to 4/5 for bilateral UE's.                         Time Tracking:     OT Date of Treatment: 12/28/22  OT Start Time: 1345  OT Stop Time: 1416  OT Total Time (min): 31 min    Billable Minutes:Self Care/Home Management 12  Therapeutic Activity 19    OT/ROSS: OT          12/28/2022       23:55

## 2022-12-28 NOTE — PLAN OF CARE
Patient rested well last night. She had a couple of episodes of back spasms, treated with pain medication, beside her regular medication. Encouraged patient to calm down and try to relax when she gets the muscle spasm and she needs to ambulate and sit in the chair today, verbalized that she understood.

## 2022-12-28 NOTE — NURSING
Sagar from PT (physical therapy) worked with patient regarding back pain.  Put pt in recliner and PT instructed how to stretch out back to relieve muscle spasms.  Pt continues to cry out in pain and states no relief from pain medication or stretching.

## 2022-12-28 NOTE — PROGRESS NOTES
"[facility] Medicine  Progress Note    Patient Name: Rachel Zazueta  MRN: [id number]  Patient Class: IP- Inpatient   Admission Date: 12/23/2022  Length of Stay: 5 days  Attending Physician: Hal Barger Jr., MD  Primary Care Provider: Dannielle Merino DO        Subjective:     Principal Problem:Severe sepsis        HPI:  ER HPI:  42-year-old female with a history of anemia, anxiety, recurrent cellulitis, hepatitis C, IVDU, liver cirrhosis, cholecystectomy, kidney stones, lumbar fusion, shoulder surgery, chronic back pain comes in c/o generalized weakness, fatigue, and bilateral flank pain.  She reports that this has been going on intermittently for several days and was diagnosed with a UTI recently.  No fever.  No abdominal pain or vomiting or diarrhea.     IM HPI:  Patient is well known to our service.  Patient has a history of IV drug abuse and unfortunately has relapsed.  Patient has a history of a epidural abscess requiring a large lumbar surgery with multiple rehabilitative stays and therapy.  The patient was progressing to ambulation and recently has declined.  Patient admits to starting drugs including IV drug use again.  Patient presented to the ED with the above symptoms and she has a noticeable cardiac murmur today that we are getting a stat echocardiogram.  Patient's been started on antibiotics fluid resuscitated and seems to have sepsis.  Patient's urinary studies were abnormal but not overwhelming.      Overview/Hospital Course:  12/24/22 CG: Patient remains in the ICU today. Has a lot of muscle spasms and low back pain. Echocardiogram ordered yesterday and completed; significant for pulm HTN but without vegetations.   12/25/22 CG:  Overnight she was having a lot of significant discomfort and pain.  We placed her on Precedex and this has helped significantly with her body aches, her irritability and muscle spasms."  12/26/22 FM:  Patient required Precedex for agitation " and mood changes.  Patient is calm and cooperative this morning.  Patient appears to have E coli sepsis so will taper antibiotics.  Patient's echocardiogram showed no vegetation and E coli would be low risk for metastatic infection.  Will transfer the patient to the floor once her Precedex discontinue we counseled her again today on the importance of avoiding substances and illicit drugs.  This continues to be setback for her.  12/27/22 WC:  Patient overall remains stable and is slowly improving.  Urine culture has revealed Candida albicans in addition to blood culture revealing E coli.  12/28/22 WC:  Patient resting comfortably this morning.  Pharm ID on case for treatment duration recs.        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9    CHOLECYSTECTOMY      KIDNEY SURGERY      LUMBAR FUSION Bilateral 3/2/2022    Procedure: FUSION, SPINE, LUMBAR;  Surgeon: Guille Templeton MD;  Location: [address];  Service: Neurosurgery;  Laterality: Bilateral;  AIRO  T10--Pelvis    REMOVAL OF HARDWARE FROM SPINE N/A 2/21/2022    Procedure: REMOVAL, HARDWARE, SPINE;  Surgeon: Guille Templeton MD;  Location: [address];  Service: Neurosurgery;  Laterality: N/A;  [place]    SPINE SURGERY         Review of patient's allergies indicates:   Allergen Reactions    Bee venom protein (honey bee) Anaphylaxis     Patient reports she is allergic to bee stings.    Naproxen Anaphylaxis     Throat closing    12-23- Patient reports taking Ibuprofen 200 mg at home  without problems. Verified X3. [place]    Wasp sting [allergen ext-venom-honey bee] Anaphylaxis    Adhesive Blisters    Iodine and iodide containing products Hives     Allergic to iodine in seafood only    Shellfish containing products     Nuts [tree nut] Rash       No current facility-administered medications on file prior to encounter.     Current Outpatient Medications on File Prior to Encounter   Medication Sig    acetaminophen (TYLENOL) 325 MG tablet Take 2 tablets (650 mg total) by mouth every 6 (six) hours as needed for Temperature greater than (100.4 F).    albuterol (ACCUNEB) 1.25 mg/3 mL Nebu Take 3 mLs (1.25 mg total) by nebulization every 6 (six) hours as needed (shortness of breath). Rescue    [] amoxicillin (AMOXIL) 500 MG capsule Take 2 capsules (1,000 mg total) by mouth every 12 (twelve) hours.    buPROPion (WELLBUTRIN) 100 MG tablet Take 1 tablet (100 mg total) by mouth 2 (two) times daily.    ciprofloxacin HCl (CIPRO) 500 MG tablet Take 500 mg by mouth 2 (two) times daily.    folic acid (FOLVITE) 1 MG tablet Take 1 tablet (1 mg total) by mouth once daily. (Patient not taking: Reported on 2022)    pantoprazole (PROTONIX) 40 MG tablet Take 1 tablet (40 mg total) by mouth once daily.    pregabalin (LYRICA) 75 MG capsule Take 1 capsule (75 mg total) by mouth 3 (three) times daily.    sulfamethoxazole-trimethoprim 800-160mg (BACTRIM DS) 800-160 mg Tab Take 2 tablets by mouth 2 (two) times daily.    [DISCONTINUED] diclofenac sodium (VOLTAREN) 1 % Gel Apply 2 g topically 4 (four) times daily.     Family History       Problem Relation (Age of Onset)    Cirrhosis Father    Drug abuse Mother, Father    Hepatitis Mother, Father    Liver cancer Mother          Tobacco Use    Smoking status: Some Days     Packs/day: 0.50     Years: 23.00     Pack years: 11.50     Types: Cigarettes    Smokeless tobacco: Never    Tobacco comments:     patient states she knows she nees to quit.    Substance and Sexual Activity    Alcohol use: Not Currently     Comment: quit 2014    Drug use: Yes     Frequency: 4.0 times per week     Types: Marijuana     Comment: Patient denies needle use, only inhalation of methampehtamines or orally.  Last known drug use was prior to admission to hospital stay for surgery    Sexual activity: Yes     Partners: Male     Birth control/protection: None     Review of Systems   Unable to perform ROS: Acuity of condition   Objective:     Vital Signs (Most Recent):  Temp: 97.6 °F (36.4 °C) (12/28/22 0715)  Pulse: 100 (12/28/22 0445)  Resp: 12 (12/28/22 0445)  BP: (!) 102/52 (12/28/22 0445)  SpO2: 99 % (12/27/22 1915)   Vital Signs (24h Range):  Temp:  [97.6 °F (36.4 °C)-98 °F (36.7 °C)] 97.6 °F (36.4 °C)  Pulse:  [] 100  Resp:  [11-36] 12  SpO2:  [95 %-100 %] 99 %  BP: (102-123)/(52-86) 102/52     Weight: 97.5 kg (215 lb)  Body mass index is 33.67 kg/m².    Physical Exam  Vitals and nursing note reviewed.   Constitutional:       General: She is not in acute distress.     Appearance: She is obese. She is ill-appearing.   HENT:      Head: Normocephalic and atraumatic.      Nose: Nose normal. No congestion or rhinorrhea.      Mouth/Throat:      Mouth: Mucous membranes are dry.      Pharynx: No oropharyngeal exudate.   Eyes:      General: No scleral icterus.  Neck:      Vascular: No carotid bruit.   Cardiovascular:      Rate and Rhythm: Regular rhythm. Tachycardia present.      Heart sounds: Murmur heard.     No friction rub. No gallop.   Pulmonary:      Effort: No respiratory distress.      Breath sounds: No stridor. No wheezing, rhonchi or rales.   Chest:      Chest wall: No tenderness.   Abdominal:      General: There is no distension.      Palpations: There is no mass.      Tenderness: There is no abdominal tenderness. There is no right CVA tenderness, left CVA tenderness, guarding or rebound.      Hernia: No hernia is present.   Musculoskeletal:         General: No  swelling, tenderness or deformity.      Cervical back: Neck supple. No rigidity or tenderness.      Right lower leg: Edema present.      Left lower leg: Edema present.   Lymphadenopathy:      Cervical: No cervical adenopathy.   Skin:     Capillary Refill: Capillary refill takes less than 2 seconds.      Coloration: Skin is not jaundiced or pale.   Neurological:      Cranial Nerves: No cranial nerve deficit.      Sensory: Sensory deficit present.      Motor: Weakness present.   Psychiatric:      Comments: Calm, cooperative, moving all extremities.           Significant Labs: CBC:   Recent Labs   Lab 12/27/22  0338 12/28/22 0357   WBC 1.97* 1.74*   HGB 8.0* 8.0*   HCT 24.7* 24.7*   PLT 52* 56*       CMP:   Recent Labs   Lab 12/27/22 0338 12/28/22 0357    140   K 3.6 4.1    108   CO2 29 32*   * 88   BUN 6 6   CREATININE 0.5 0.4*   CALCIUM 7.2* 7.4*   PROT 6.4 6.1   ALBUMIN 1.6* 1.7*   BILITOT 0.9 1.0   ALKPHOS 115 109   AST 29 29   ALT 14 16   ANIONGAP 3* 0*       Urine Studies:   No results for input(s): COLORU, APPEARANCEUA, PHUR, SPECGRAV, PROTEINUA, GLUCUA, KETONESU, BILIRUBINUA, OCCULTUA, NITRITE, UROBILINOGEN, LEUKOCYTESUR, RBCUA, WBCUA, BACTERIA, SQUAMEPITHEL, HYALINECASTS in the last 48 hours.    Invalid input(s): WRIGHTSUR      Significant Imaging: I have reviewed all pertinent imaging results/findings within the past 24 hours.      Assessment/Plan:      * Severe sepsis  This patient does have evidence of infective focus  My overall impression is sepsis. Vital signs were reviewed and noted in progress note.  Antibiotics given-   Antibiotics (From admission, onward)    Start     Stop Route Frequency Ordered    12/26/22 1515  meropenem-0.9% sodium chloride 1 g/50 mL IVPB        See Hyperspace for full Linked Orders Report.    -- IV Every 8 hours (non-standard times) 12/26/22 1409    12/26/22 1500  meropenem-0.9% sodium chloride 1 g/50 mL IVPB        See Hyperspace for full Linked Orders  Report.    -- IV Every 8 hours (non-standard times) 12/26/22 1409    12/23/22 0533  vancomycin (VANCOCIN) 1,000 mg injection        Note to Pharmacy: Created by cabinet override    12/23 1744   12/23/22 0533    12/23/22 0405  piperacillin-tazobactam (ZOSYN) 4.5 gram injection        Note to Pharmacy: Created by cabinet override    12/23 1614   12/23/22 0405        Cultures were taken-   Microbiology Results (last 7 days)     Procedure Component Value Units Date/Time    Blood culture [[id number]]     Order Status: Sent Specimen: Blood     Blood culture [[id number]]     Order Status: Sent Specimen: Blood     Blood culture #2 **CANNOT BE ORDERED STAT** [[id number]] Collected: 12/23/22 0257    Order Status: Completed Specimen: Blood Updated: 12/27/22 2022     Blood Culture, Routine No Growth to date      No Growth to date      No Growth to date      No Growth to date      No Growth to date    Urine culture [[id number]]  (Abnormal) Collected: 12/23/22 0501    Order Status: Completed Specimen: Urine Updated: 12/26/22 1250     Urine Culture, Routine JACINTA ALBICANS  50,000 - 99,999 cfu/ml  Treatment of asymptomatic candiduria is not recommended (except for   specific populations). Candida isolated in the urine typically   represents colonization. If an indwelling urinary catheter is present  it should be removed or replaced.      Narrative:      Preferred Collection Type->Urine, Clean Catch  Specimen Source->Urine    Blood culture #1 **CANNOT BE ORDERED STAT** [[id number]]  (Abnormal)  (Susceptibility) Collected: 12/23/22 0258    Order Status: Completed Specimen: Blood Updated: 12/26/22 1037     Blood Culture, Routine Gram stain aer bottle: Gram negative rods      Gram stain merissa bottle: Gram negative rods      Results called to and read back by:Sari Deng RN 12/24/2022 05:57      ESCHERICHIA COLI ESBL        Latest lactate reviewed, they are-  No results for input(s): LACTATE in the last 72 hours.    Organ dysfunction  indicated by Acute kidney injury, Encephalopathy  and Acute liver injury  Source- ?    Source control Achieved by- see orders      Pulmonary hypertension  Found incidentally on echocardiogram with evidence of elevated right sided pressures and dilated heart chambers on the right side. Unclear the exact etiology or class of Pulm HTN but given her significant substance abuse Class 1 certainly is a possibility.   - Will refer to outpatient pulmonologist.   - Will consider a cariology consult while inpatient to see if they think it would be beneficial to have a heart cath done while inpatient      Murmur, cardiac  No evidence of vegetations    Mild episode of recurrent major depressive disorder  Patient with altered mental status holding antidepressants for now      Cannabis use disorder, moderate, dependence  As above      Amphetamine use disorder, severe  Continue to  educate and support      Spinal stenosis at L4-L5 level        Substance use disorder        Chronic hepatitis C with cirrhosis  Labs noted      Cirrhosis of liver without ascites  Labs noted    AST   Date Value Ref Range Status   12/28/2022 29 10 - 40 U/L Final   12/27/2022 29 10 - 40 U/L Final   12/25/2022 31 10 - 40 U/L Final   12/24/2022 29 10 - 40 U/L Final   12/23/2022 47 (H) 10 - 40 U/L Final     ALT   Date Value Ref Range Status   12/28/2022 16 10 - 44 U/L Final   12/27/2022 14 10 - 44 U/L Final   12/25/2022 15 10 - 44 U/L Final   12/24/2022 14 10 - 44 U/L Final   12/23/2022 19 10 - 44 U/L Final        Alkaline Phosphatase   Date Value Ref Range Status   12/28/2022 109 55 - 135 U/L Final   12/27/2022 115 55 - 135 U/L Final   12/25/2022 111 55 - 135 U/L Final   12/24/2022 111 55 - 135 U/L Final   12/23/2022 142 (H) 55 - 135 U/L Final            UTI (urinary tract infection)  On abx, ? Source.  Suspect source of sepsis.    Treat candida x 2 weeks given hx.      VTE Risk Mitigation (From admission, onward)         Ordered     IP VTE HIGH RISK  PATIENT  Once         12/23/22 0647     Place sequential compression device  Until discontinued         12/23/22 0647                Discharge Planning   SHIRA:      Code Status: Full Code   Is the patient medically ready for discharge?:     Reason for patient still in hospital (select all that apply): Treatment  Discharge Plan A: Home with family                  Hal Barger Jr, MD  Department of Hospital Medicine   [facility]

## 2022-12-28 NOTE — SUBJECTIVE & OBJECTIVE
Past Medical History:   Diagnosis Date    Anemia     Anxiety     Depressed     Diskitis     Hep C w/o coma, chronic     IV drug abuse     Kidney stone     Liver cirrhosis        Past Surgical History:   Procedure Laterality Date    ARTHROTOMY OF SHOULDER Left 11/15/2022    Procedure: ARTHROTOMY, SHOULDER;  Surgeon: Bjorn Hayes MD;  Location: [facility];  Service: Orthopedics;  Laterality: Left;  Left acromioclavicular joint arthrotomy    BACK SURGERY      benine tumor removal      forehead, age 9    CHOLECYSTECTOMY      KIDNEY SURGERY      LUMBAR FUSION Bilateral 3/2/2022    Procedure: FUSION, SPINE, LUMBAR;  Surgeon: Guille Templeton MD;  Location: [address];  Service: Neurosurgery;  Laterality: Bilateral;  AIRO  T10--Pelvis    REMOVAL OF HARDWARE FROM SPINE N/A 2022    Procedure: REMOVAL, HARDWARE, SPINE;  Surgeon: Guille Templeton MD;  Location: [address];  Service: Neurosurgery;  Laterality: N/A;  [place]    SPINE SURGERY         Review of patient's allergies indicates:   Allergen Reactions    Bee venom protein (honey bee) Anaphylaxis     Patient reports she is allergic to bee stings.    Naproxen Anaphylaxis     Throat closing    - Patient reports taking Ibuprofen 200 mg at home without problems. Verified X3. [place]    Wasp sting [allergen ext-venom-honey bee] Anaphylaxis    Adhesive Blisters    Iodine and iodide containing products Hives     Allergic to iodine in seafood only    Shellfish containing products     Nuts [tree nut] Rash       No current facility-administered medications on file prior to encounter.     Current Outpatient Medications on File Prior to Encounter   Medication Sig    acetaminophen (TYLENOL) 325 MG tablet Take 2 tablets (650 mg total) by mouth every 6 (six) hours as needed for Temperature greater than (100.4 F).    albuterol (ACCUNEB) 1.25 mg/3 mL Nebu Take 3 mLs (1.25 mg total) by nebulization every 6 (six) hours as needed (shortness of breath). Rescue    []  amoxicillin (AMOXIL) 500 MG capsule Take 2 capsules (1,000 mg total) by mouth every 12 (twelve) hours.    buPROPion (WELLBUTRIN) 100 MG tablet Take 1 tablet (100 mg total) by mouth 2 (two) times daily.    ciprofloxacin HCl (CIPRO) 500 MG tablet Take 500 mg by mouth 2 (two) times daily.    folic acid (FOLVITE) 1 MG tablet Take 1 tablet (1 mg total) by mouth once daily. (Patient not taking: Reported on 11/25/2022)    pantoprazole (PROTONIX) 40 MG tablet Take 1 tablet (40 mg total) by mouth once daily.    pregabalin (LYRICA) 75 MG capsule Take 1 capsule (75 mg total) by mouth 3 (three) times daily.    sulfamethoxazole-trimethoprim 800-160mg (BACTRIM DS) 800-160 mg Tab Take 2 tablets by mouth 2 (two) times daily.    [DISCONTINUED] diclofenac sodium (VOLTAREN) 1 % Gel Apply 2 g topically 4 (four) times daily.     Family History       Problem Relation (Age of Onset)    Cirrhosis Father    Drug abuse Mother, Father    Hepatitis Mother, Father    Liver cancer Mother          Tobacco Use    Smoking status: Some Days     Packs/day: 0.50     Years: 23.00     Pack years: 11.50     Types: Cigarettes    Smokeless tobacco: Never    Tobacco comments:     patient states she knows she nees to quit.   Substance and Sexual Activity    Alcohol use: Not Currently     Comment: quit 2014    Drug use: Yes     Frequency: 4.0 times per week     Types: Marijuana     Comment: Patient denies needle use, only inhalation of methampehtamines or orally.  Last known drug use was prior to admission to hospital stay for surgery    Sexual activity: Yes     Partners: Male     Birth control/protection: None     Review of Systems   Unable to perform ROS: Acuity of condition   Objective:     Vital Signs (Most Recent):  Temp: 97.6 °F (36.4 °C) (12/28/22 0715)  Pulse: 100 (12/28/22 0445)  Resp: 12 (12/28/22 0445)  BP: (!) 102/52 (12/28/22 0445)  SpO2: 99 % (12/27/22 1915)   Vital Signs (24h Range):  Temp:  [97.6 °F (36.4 °C)-98 °F (36.7 °C)] 97.6 °F (36.4  °C)  Pulse:  [] 100  Resp:  [11-36] 12  SpO2:  [95 %-100 %] 99 %  BP: (102-123)/(52-86) 102/52     Weight: 97.5 kg (215 lb)  Body mass index is 33.67 kg/m².    Physical Exam  Vitals and nursing note reviewed.   Constitutional:       General: She is not in acute distress.     Appearance: She is obese. She is ill-appearing.   HENT:      Head: Normocephalic and atraumatic.      Nose: Nose normal. No congestion or rhinorrhea.      Mouth/Throat:      Mouth: Mucous membranes are dry.      Pharynx: No oropharyngeal exudate.   Eyes:      General: No scleral icterus.  Neck:      Vascular: No carotid bruit.   Cardiovascular:      Rate and Rhythm: Regular rhythm. Tachycardia present.      Heart sounds: Murmur heard.     No friction rub. No gallop.   Pulmonary:      Effort: No respiratory distress.      Breath sounds: No stridor. No wheezing, rhonchi or rales.   Chest:      Chest wall: No tenderness.   Abdominal:      General: There is no distension.      Palpations: There is no mass.      Tenderness: There is no abdominal tenderness. There is no right CVA tenderness, left CVA tenderness, guarding or rebound.      Hernia: No hernia is present.   Musculoskeletal:         General: No swelling, tenderness or deformity.      Cervical back: Neck supple. No rigidity or tenderness.      Right lower leg: Edema present.      Left lower leg: Edema present.   Lymphadenopathy:      Cervical: No cervical adenopathy.   Skin:     Capillary Refill: Capillary refill takes less than 2 seconds.      Coloration: Skin is not jaundiced or pale.   Neurological:      Cranial Nerves: No cranial nerve deficit.      Sensory: Sensory deficit present.      Motor: Weakness present.   Psychiatric:      Comments: Calm, cooperative, moving all extremities.           Significant Labs: CBC:   Recent Labs   Lab 12/27/22 0338 12/28/22  0357   WBC 1.97* 1.74*   HGB 8.0* 8.0*   HCT 24.7* 24.7*   PLT 52* 56*       CMP:   Recent Labs   Lab 12/27/22 0338  12/28/22  0357    140   K 3.6 4.1    108   CO2 29 32*   * 88   BUN 6 6   CREATININE 0.5 0.4*   CALCIUM 7.2* 7.4*   PROT 6.4 6.1   ALBUMIN 1.6* 1.7*   BILITOT 0.9 1.0   ALKPHOS 115 109   AST 29 29   ALT 14 16   ANIONGAP 3* 0*       Urine Studies:   No results for input(s): COLORU, APPEARANCEUA, PHUR, SPECGRAV, PROTEINUA, GLUCUA, KETONESU, BILIRUBINUA, OCCULTUA, NITRITE, UROBILINOGEN, LEUKOCYTESUR, RBCUA, WBCUA, BACTERIA, SQUAMEPITHEL, HYALINECASTS in the last 48 hours.    Invalid input(s): TREY      Significant Imaging: I have reviewed all pertinent imaging results/findings within the past 24 hours.

## 2022-12-28 NOTE — ASSESSMENT & PLAN NOTE
Labs noted    AST   Date Value Ref Range Status   12/28/2022 29 10 - 40 U/L Final   12/27/2022 29 10 - 40 U/L Final   12/25/2022 31 10 - 40 U/L Final   12/24/2022 29 10 - 40 U/L Final   12/23/2022 47 (H) 10 - 40 U/L Final     ALT   Date Value Ref Range Status   12/28/2022 16 10 - 44 U/L Final   12/27/2022 14 10 - 44 U/L Final   12/25/2022 15 10 - 44 U/L Final   12/24/2022 14 10 - 44 U/L Final   12/23/2022 19 10 - 44 U/L Final        Alkaline Phosphatase   Date Value Ref Range Status   12/28/2022 109 55 - 135 U/L Final   12/27/2022 115 55 - 135 U/L Final   12/25/2022 111 55 - 135 U/L Final   12/24/2022 111 55 - 135 U/L Final   12/23/2022 142 (H) 55 - 135 U/L Final

## 2022-12-28 NOTE — PT/OT/SLP PROGRESS
Occupational Therapy   Treatment    Name: Rachel Zazueta  MRN: [id number]  Admitting Diagnosis:  Severe sepsis       Recommendations:     Discharge Recommendations: other (see comments) (To be further determined based on progress prior to discharge.)  Discharge Equipment Recommendations:  other (see comments) (To be further determined based on progress prior to discharge.)  Barriers to discharge:  Other (Comment) (Medical and functional status)    Assessment:     Rachel Zazueta is a 42 y.o. female with a medical diagnosis of Severe sepsis.  She presents with severe pain in lower back impacting activity tolerance. Performance deficits affecting function are weakness, impaired endurance, impaired sensation, impaired self care skills, impaired functional mobility, impaired balance, decreased safety awareness, pain.     Rehab Prognosis:  Fair; patient would benefit from acute skilled OT services to address these deficits and reach maximum level of function.       Plan:     Patient to be seen 5 x/week to address the above listed problems via self-care/home management, therapeutic activities, therapeutic exercises, sensory integration  Plan of Care Expires: 01/02/23  Plan of Care Reviewed with: patient    Subjective     Pain/Comfort:  Pain Rating 1: 10/10  Location - Orientation 1: lower  Location 1: back  Pain Addressed 1: Reposition, Cessation of Activity  Pain Rating Post-Intervention 1: 6/10    Objective:     Communicated with: nurse prior to session.  Patient found up in chair with telemetry, pulse ox (continuous), peripheral IV, blood pressure cuff upon OT entry to room.    General Precautions: Standard, fall    Orthopedic Precautions:N/A  Braces: N/A  Respiratory Status: Room air     Occupational Performance:     Functional Mobility/Transfers:  Patient completed Sit <> Stand Transfer with moderate assistance  with  rolling walker   Patient completed Bed <> Chair Transfer using Step Transfer technique with  moderate assistance with rolling walker  Functional Mobility: Pt ambulated 6' requiring steadying assist utilizing RW.    Treatment & Education:  Pt was cooperative, but exhibited severe pain including grimacing and vocalizations upon arrival. She was provided extensive education / instruction regarding repositioning to facilitate decreased pain and to alleviate / reduce muscle spasms when supine in bed or seated in recliner. Also, she participated in functional transfer retraining to / from bed, recliner, and BSC emphasizing fall prevention providing extra time with repetition requiring mod assist secondary to lifting assist and steadying with continuous verbal and tactile cueing for posture, safety, and technique. Pt with improved pain as further noted above following treatment session in which patient no longer tearful or vocal.     Patient left up in chair with all lines intact, call button in reach, and nurse notified    GOALS:   Multidisciplinary Problems       Occupational Therapy Goals          Problem: Occupational Therapy    Goal Priority Disciplines Outcome Interventions   Occupational Therapy Goal     OT, PT/OT     Description: Goals to be met by: 01/02/22     Patient will increase functional independence with ADLs by performing:    UE Dressing with Set-up Assistance.  LE Dressing with Minimal Assistance.  Grooming while seated with Modified [place].  Toileting from toilet with Supervision for hygiene and clothing management.   Bathing from  shower chair/bench with Minimal Assistance.  Step transfer with Minimal Assistance  Toilet transfer to toilet with Minimal Assistance.  Increased functional strength to 4/5 for bilateral UE's.                         Time Tracking:     OT Date of Treatment: 12/27/22  OT Start Time: 1720  OT Stop Time: 1758  OT Total Time (min): 38 min    Billable Minutes:Therapeutic Activity 38    OT/ROSS: OT          12/27/2022

## 2022-12-29 LAB
ALBUMIN SERPL BCP-MCNC: 1.7 G/DL (ref 3.5–5.2)
ALP SERPL-CCNC: 116 U/L (ref 55–135)
ALT SERPL W/O P-5'-P-CCNC: 16 U/L (ref 10–44)
ANION GAP SERPL CALC-SCNC: 2 MMOL/L (ref 8–16)
AST SERPL-CCNC: 33 U/L (ref 10–40)
BASOPHILS # BLD AUTO: 0.02 K/UL (ref 0–0.2)
BASOPHILS NFR BLD: 0.9 % (ref 0–1.9)
BILIRUB SERPL-MCNC: 0.7 MG/DL (ref 0.1–1)
BUN SERPL-MCNC: 9 MG/DL (ref 6–20)
CALCIUM SERPL-MCNC: 7.5 MG/DL (ref 8.7–10.5)
CHLORIDE SERPL-SCNC: 108 MMOL/L (ref 95–110)
CO2 SERPL-SCNC: 32 MMOL/L (ref 23–29)
CREAT SERPL-MCNC: 0.5 MG/DL (ref 0.5–1.4)
DIFFERENTIAL METHOD: ABNORMAL
EOSINOPHIL # BLD AUTO: 0.1 K/UL (ref 0–0.5)
EOSINOPHIL NFR BLD: 4.2 % (ref 0–8)
ERYTHROCYTE [DISTWIDTH] IN BLOOD BY AUTOMATED COUNT: 17.2 % (ref 11.5–14.5)
EST. GFR  (NO RACE VARIABLE): >60 ML/MIN/1.73 M^2
GLUCOSE SERPL-MCNC: 117 MG/DL (ref 70–110)
HCT VFR BLD AUTO: 26 % (ref 37–48.5)
HGB BLD-MCNC: 8.1 G/DL (ref 12–16)
IMM GRANULOCYTES # BLD AUTO: 0.02 K/UL (ref 0–0.04)
IMM GRANULOCYTES NFR BLD AUTO: 0.9 % (ref 0–0.5)
LYMPHOCYTES # BLD AUTO: 0.5 K/UL (ref 1–4.8)
LYMPHOCYTES NFR BLD: 23.1 % (ref 18–48)
MAGNESIUM SERPL-MCNC: 1.9 MG/DL (ref 1.6–2.6)
MCH RBC QN AUTO: 28 PG (ref 27–31)
MCHC RBC AUTO-ENTMCNC: 31.2 G/DL (ref 32–36)
MCV RBC AUTO: 90 FL (ref 82–98)
MONOCYTES # BLD AUTO: 0.2 K/UL (ref 0.3–1)
MONOCYTES NFR BLD: 9 % (ref 4–15)
NEUTROPHILS # BLD AUTO: 1.3 K/UL (ref 1.8–7.7)
NEUTROPHILS NFR BLD: 61.9 % (ref 38–73)
NRBC BLD-RTO: 0 /100 WBC
PLATELET # BLD AUTO: 59 K/UL (ref 150–450)
PMV BLD AUTO: 8.9 FL (ref 9.2–12.9)
POTASSIUM SERPL-SCNC: 3.8 MMOL/L (ref 3.5–5.1)
PROT SERPL-MCNC: 6.5 G/DL (ref 6–8.4)
RBC # BLD AUTO: 2.89 M/UL (ref 4–5.4)
SODIUM SERPL-SCNC: 142 MMOL/L (ref 136–145)
WBC # BLD AUTO: 2.12 K/UL (ref 3.9–12.7)

## 2022-12-29 PROCEDURE — 63600175 PHARM REV CODE 636 W HCPCS: Performed by: INTERNAL MEDICINE

## 2022-12-29 PROCEDURE — 80053 COMPREHEN METABOLIC PANEL: CPT | Performed by: INTERNAL MEDICINE

## 2022-12-29 PROCEDURE — 63700000 PHARM REV CODE 250 ALT 637 W/O HCPCS: Performed by: INTERNAL MEDICINE

## 2022-12-29 PROCEDURE — 27000207 HC ISOLATION

## 2022-12-29 PROCEDURE — 36415 COLL VENOUS BLD VENIPUNCTURE: CPT | Performed by: INTERNAL MEDICINE

## 2022-12-29 PROCEDURE — 97535 SELF CARE MNGMENT TRAINING: CPT

## 2022-12-29 PROCEDURE — 97530 THERAPEUTIC ACTIVITIES: CPT

## 2022-12-29 PROCEDURE — 83735 ASSAY OF MAGNESIUM: CPT | Performed by: INTERNAL MEDICINE

## 2022-12-29 PROCEDURE — 11000001 HC ACUTE MED/SURG PRIVATE ROOM

## 2022-12-29 PROCEDURE — A4216 STERILE WATER/SALINE, 10 ML: HCPCS | Performed by: INTERNAL MEDICINE

## 2022-12-29 PROCEDURE — 25000003 PHARM REV CODE 250: Performed by: INTERNAL MEDICINE

## 2022-12-29 PROCEDURE — 85025 COMPLETE CBC W/AUTO DIFF WBC: CPT | Performed by: INTERNAL MEDICINE

## 2022-12-29 RX ADMIN — METHOCARBAMOL TABLETS 500 MG: 500 TABLET, COATED ORAL at 08:12

## 2022-12-29 RX ADMIN — MORPHINE SULFATE 2 MG: 2 INJECTION, SOLUTION INTRAMUSCULAR; INTRAVENOUS at 09:12

## 2022-12-29 RX ADMIN — MEROPENEM AND SODIUM CHLORIDE 1 G: 1 INJECTION, SOLUTION INTRAVENOUS at 11:12

## 2022-12-29 RX ADMIN — Medication 10 ML: at 05:12

## 2022-12-29 RX ADMIN — PREGABALIN 75 MG: 75 CAPSULE ORAL at 08:12

## 2022-12-29 RX ADMIN — MEROPENEM AND SODIUM CHLORIDE 1 G: 1 INJECTION, SOLUTION INTRAVENOUS at 02:12

## 2022-12-29 RX ADMIN — HYDROCODONE BITARTRATE AND ACETAMINOPHEN 1 TABLET: 5; 325 TABLET ORAL at 01:12

## 2022-12-29 RX ADMIN — MEROPENEM AND SODIUM CHLORIDE 1 G: 1 INJECTION, SOLUTION INTRAVENOUS at 07:12

## 2022-12-29 RX ADMIN — MEROPENEM AND SODIUM CHLORIDE 1 G: 1 INJECTION, SOLUTION INTRAVENOUS at 10:12

## 2022-12-29 RX ADMIN — METHOCARBAMOL TABLETS 500 MG: 500 TABLET, COATED ORAL at 01:12

## 2022-12-29 RX ADMIN — HYDROCODONE BITARTRATE AND ACETAMINOPHEN 1 TABLET: 5; 325 TABLET ORAL at 06:12

## 2022-12-29 RX ADMIN — MORPHINE SULFATE 2 MG: 2 INJECTION, SOLUTION INTRAMUSCULAR; INTRAVENOUS at 03:12

## 2022-12-29 RX ADMIN — MEROPENEM AND SODIUM CHLORIDE 1 G: 1 INJECTION, SOLUTION INTRAVENOUS at 03:12

## 2022-12-29 RX ADMIN — HYDROCODONE BITARTRATE AND ACETAMINOPHEN 1 TABLET: 5; 325 TABLET ORAL at 08:12

## 2022-12-29 RX ADMIN — METHOCARBAMOL TABLETS 500 MG: 500 TABLET, COATED ORAL at 05:12

## 2022-12-29 RX ADMIN — PANTOPRAZOLE SODIUM 40 MG: 40 TABLET, DELAYED RELEASE ORAL at 08:12

## 2022-12-29 RX ADMIN — MEROPENEM AND SODIUM CHLORIDE 1 G: 1 INJECTION, SOLUTION INTRAVENOUS at 06:12

## 2022-12-29 RX ADMIN — Medication 10 ML: at 11:12

## 2022-12-29 RX ADMIN — POLYETHYLENE GLYCOL (3350) 17 G: 17 POWDER, FOR SOLUTION ORAL at 08:12

## 2022-12-29 RX ADMIN — FLUCONAZOLE 200 MG: 100 TABLET ORAL at 08:12

## 2022-12-29 RX ADMIN — Medication 10 ML: at 12:12

## 2022-12-29 NOTE — PT/OT/SLP PROGRESS
Occupational Therapy   Treatment    Name: Rachel Zazueta  MRN: [id number]  Admitting Diagnosis:  Severe sepsis       Recommendations:     Discharge Recommendations: other (see comments) (To be further determined based on progress prior to discharge.)  Discharge Equipment Recommendations:  other (see comments) (To be further determined based on progress prior to discharge.)  Barriers to discharge:  Other (Comment) (Medical and functional status)    Assessment:     Rachel Zazueta is a 42 y.o. female with a medical diagnosis of Severe sepsis.  She presents with continued back pain and muscle spasms. Performance deficits affecting function are weakness, impaired endurance, impaired sensation, impaired self care skills, impaired functional mobility, impaired balance, decreased safety awareness, pain.     Rehab Prognosis:  Fair; patient would benefit from acute skilled OT services to address these deficits and reach maximum level of function.       Plan:     Patient to be seen 5 x/week to address the above listed problems via self-care/home management, therapeutic activities, therapeutic exercises, sensory integration  Plan of Care Expires: 01/02/23  Plan of Care Reviewed with: patient    Subjective     Pain/Comfort:  Pain Rating 1: 10/10  Location - Orientation 1: lower  Location 1: back  Pain Addressed 1: Reposition, Cessation of Activity, Nurse notified  Pain Rating Post-Intervention 1: 8/10    Objective:     Communicated with: nurse prior to session.  Patient found up in chair with telemetry, PureWick upon OT entry to room.    General Precautions: Standard, fall    Orthopedic Precautions:N/A  Braces: N/A  Respiratory Status: Room air     Occupational Performance:     Bed Mobility:    Patient completed Rolling/Turning to Left with  minimum assistance  Patient completed Rolling/Turning to Right with minimum assistance  Patient completed Scooting/Bridging with moderate assistance  Patient completed Supine to Sit  with moderate assistance  Patient completed Sit to Supine with moderate assistance     Functional Mobility/Transfers:  Patient completed Sit <> Stand Transfer with moderate assistance  with  rolling walker   Patient completed Toilet Transfer Stand Pivot technique with maximum assistance with  bedside commode  Functional Mobility: Pt unable to ambulate on this date.    Activities of Daily Living:  Lower Body Dressing: maximal assistance        [facility] 6 Click ADL:      Treatment & Education:  Pt was cooperative with verbal encouragement while exhibiting continued pain and anxiety with activity. She performed bed mobility requiring min to mod assist secondary to lifting assist and stabilization of trunk with additional assist of (L) LE during supine to sit transition, scooting assist at (L) hip to EOB, and lifting assist of bilateral LE's during sit to supine transition with additional cueing for technique to facilitate reduced pain. Pt then participated in ADL retraining regarding LB dressing emphasizing use of compensatory strategies providing extra time requiring max assist secondary to assist to [place] bilateral socks with verbal and tactile cueing for technique; however, patient will require use of sock aide secondary to lower back pain, decreased trunk mobility, and impaired dynamic sitting balance. Next, she participated in functional transfer retraining to / from bed and BSC emphasizing fall prevention providing extra time with repetition requiring max assist secondary to lifting, lowering, and steadying assist with continuous verbal and tactile cueing for safety awareness, technique, and relaxation.    Patient left HOB elevated with all lines intact, call button in reach, and nurse notified    GOALS:   Multidisciplinary Problems       Occupational Therapy Goals          Problem: Occupational Therapy    Goal Priority Disciplines Outcome Interventions   Occupational Therapy Goal     OT, PT/OT     Description: Goals  to be met by: 01/02/22     Patient will increase functional independence with ADLs by performing:    UE Dressing with Set-up Assistance.  LE Dressing with Minimal Assistance.  Grooming while seated with Modified [place].  Toileting from toilet with Supervision for hygiene and clothing management.   Bathing from  shower chair/bench with Minimal Assistance.  Step transfer with Minimal Assistance  Toilet transfer to toilet with Minimal Assistance.  Increased functional strength to 4/5 for bilateral UE's.                         Time Tracking:     OT Date of Treatment: 12/29/22  OT Start Time: 1430  OT Stop Time: 1505  OT Total Time (min): 35 min    Billable Minutes:Self Care/Home Management 15  Therapeutic Activity 20    OT/ROSS: OT          12/29/2022

## 2022-12-29 NOTE — ASSESSMENT & PLAN NOTE
Labs noted    AST   Date Value Ref Range Status   12/29/2022 33 10 - 40 U/L Final   12/28/2022 29 10 - 40 U/L Final   12/27/2022 29 10 - 40 U/L Final   12/25/2022 31 10 - 40 U/L Final   12/24/2022 29 10 - 40 U/L Final     ALT   Date Value Ref Range Status   12/29/2022 16 10 - 44 U/L Final   12/28/2022 16 10 - 44 U/L Final   12/27/2022 14 10 - 44 U/L Final   12/25/2022 15 10 - 44 U/L Final   12/24/2022 14 10 - 44 U/L Final        Alkaline Phosphatase   Date Value Ref Range Status   12/29/2022 116 55 - 135 U/L Final   12/28/2022 109 55 - 135 U/L Final   12/27/2022 115 55 - 135 U/L Final   12/25/2022 111 55 - 135 U/L Final   12/24/2022 111 55 - 135 U/L Final

## 2022-12-29 NOTE — ASSESSMENT & PLAN NOTE
This patient does have evidence of infective focus  My overall impression is sepsis. Vital signs were reviewed and noted in progress note.  Antibiotics given-   Antibiotics (From admission, onward)    Start     Stop Route Frequency Ordered    12/26/22 1515  meropenem-0.9% sodium chloride 1 g/50 mL IVPB        See Hyperspace for full Linked Orders Report.    -- IV Every 8 hours (non-standard times) 12/26/22 1409    12/26/22 1500  meropenem-0.9% sodium chloride 1 g/50 mL IVPB        See Hyperspace for full Linked Orders Report.    -- IV Every 8 hours (non-standard times) 12/26/22 1409    12/23/22 0533  vancomycin (VANCOCIN) 1,000 mg injection        Note to Pharmacy: Created by cabinet override    12/23 1744   12/23/22 0533    12/23/22 0405  piperacillin-tazobactam (ZOSYN) 4.5 gram injection        Note to Pharmacy: Created by cabinet override    12/23 1614   12/23/22 0405        Cultures were taken-   Microbiology Results (last 7 days)     Procedure Component Value Units Date/Time    Blood culture [[id number]] Collected: 12/28/22 1058    Order Status: Completed Specimen: Blood Updated: 12/29/22 0115     Blood Culture, Routine No Growth to date    Blood culture [[id number]] Collected: 12/28/22 1058    Order Status: Completed Specimen: Blood Updated: 12/29/22 0115     Blood Culture, Routine No Growth to date    Blood culture #2 **CANNOT BE ORDERED STAT** [[id number]] Collected: 12/23/22 0257    Order Status: Completed Specimen: Blood Updated: 12/28/22 2022     Blood Culture, Routine No growth after 5 days.    Urine culture [[id number]]  (Abnormal) Collected: 12/23/22 0501    Order Status: Completed Specimen: Urine Updated: 12/26/22 1250     Urine Culture, Routine JACINTA ALBICANS  50,000 - 99,999 cfu/ml  Treatment of asymptomatic candiduria is not recommended (except for   specific populations). Candida isolated in the urine typically   represents colonization. If an indwelling urinary catheter is present  it should be  removed or replaced.      Narrative:      Preferred Collection Type->Urine, Clean Catch  Specimen Source->Urine    Blood culture #1 **CANNOT BE ORDERED STAT** [[id number]]  (Abnormal)  (Susceptibility) Collected: 12/23/22 0258    Order Status: Completed Specimen: Blood Updated: 12/26/22 1037     Blood Culture, Routine Gram stain aer bottle: Gram negative rods      Gram stain merissa bottle: Gram negative rods      Results called to and read back by:Sari Deng RN 12/24/2022 05:57      ESCHERICHIA COLI ESBL        Latest lactate reviewed, they are-  No results for input(s): LACTATE in the last 72 hours.    Organ dysfunction indicated by Acute kidney injury, Encephalopathy  and Acute liver injury  Source- ?    Source control Achieved by- see orders

## 2022-12-29 NOTE — SUBJECTIVE & OBJECTIVE
Past Medical History:   Diagnosis Date    Anemia     Anxiety     Depressed     Diskitis     Hep C w/o coma, chronic     IV drug abuse     Kidney stone     Liver cirrhosis        Past Surgical History:   Procedure Laterality Date    ARTHROTOMY OF SHOULDER Left 11/15/2022    Procedure: ARTHROTOMY, SHOULDER;  Surgeon: Bjorn Hayes MD;  Location: [facility];  Service: Orthopedics;  Laterality: Left;  Left acromioclavicular joint arthrotomy    BACK SURGERY      benine tumor removal      forehead, age 9    CHOLECYSTECTOMY      KIDNEY SURGERY      LUMBAR FUSION Bilateral 3/2/2022    Procedure: FUSION, SPINE, LUMBAR;  Surgeon: Guille Templeton MD;  Location: [address];  Service: Neurosurgery;  Laterality: Bilateral;  AIRO  T10--Pelvis    REMOVAL OF HARDWARE FROM SPINE N/A 2/21/2022    Procedure: REMOVAL, HARDWARE, SPINE;  Surgeon: Guille Templeton MD;  Location: [address];  Service: Neurosurgery;  Laterality: N/A;  [place]    SPINE SURGERY         Review of patient's allergies indicates:   Allergen Reactions    Bee venom protein (honey bee) Anaphylaxis     Patient reports she is allergic to bee stings.    Naproxen Anaphylaxis     Throat closing    12-23- Patient reports taking Ibuprofen 200 mg at home without problems. Verified X3. [place]    Wasp sting [allergen ext-venom-honey bee] Anaphylaxis    Adhesive Blisters    Iodine and iodide containing products Hives     Allergic to iodine in seafood only    Shellfish containing products     Nuts [tree nut] Rash       No current facility-administered medications on file prior to encounter.     Current Outpatient Medications on File Prior to Encounter   Medication Sig    acetaminophen (TYLENOL) 325 MG tablet Take 2 tablets (650 mg total) by mouth every 6 (six) hours as needed for Temperature greater than (100.4 F).    albuterol (ACCUNEB) 1.25 mg/3 mL Nebu Take 3 mLs (1.25 mg total) by nebulization every 6 (six) hours as needed (shortness of breath). Rescue    buPROPion  (WELLBUTRIN) 100 MG tablet Take 1 tablet (100 mg total) by mouth 2 (two) times daily.    ciprofloxacin HCl (CIPRO) 500 MG tablet Take 500 mg by mouth 2 (two) times daily.    folic acid (FOLVITE) 1 MG tablet Take 1 tablet (1 mg total) by mouth once daily. (Patient not taking: Reported on 11/25/2022)    pantoprazole (PROTONIX) 40 MG tablet Take 1 tablet (40 mg total) by mouth once daily.    pregabalin (LYRICA) 75 MG capsule Take 1 capsule (75 mg total) by mouth 3 (three) times daily.    sulfamethoxazole-trimethoprim 800-160mg (BACTRIM DS) 800-160 mg Tab Take 2 tablets by mouth 2 (two) times daily.    [DISCONTINUED] diclofenac sodium (VOLTAREN) 1 % Gel Apply 2 g topically 4 (four) times daily.     Family History       Problem Relation (Age of Onset)    Cirrhosis Father    Drug abuse Mother, Father    Hepatitis Mother, Father    Liver cancer Mother          Tobacco Use    Smoking status: Some Days     Packs/day: 0.50     Years: 23.00     Pack years: 11.50     Types: Cigarettes    Smokeless tobacco: Never    Tobacco comments:     patient states she knows she nees to quit.   Substance and Sexual Activity    Alcohol use: Not Currently     Comment: quit 2014    Drug use: Yes     Frequency: 4.0 times per week     Types: Marijuana     Comment: Patient denies needle use, only inhalation of methampehtamines or orally.  Last known drug use was prior to admission to hospital stay for surgery    Sexual activity: Yes     Partners: Male     Birth control/protection: None     Review of Systems   Unable to perform ROS: Acuity of condition   Objective:     Vital Signs (Most Recent):  Temp: 98.2 °F (36.8 °C) (12/29/22 0736)  Pulse: 102 (12/29/22 0738)  Resp: 20 (12/29/22 0736)  BP: (!) 105/53 (12/29/22 0736)  SpO2: 98 % (12/29/22 0736)   Vital Signs (24h Range):  Temp:  [98 °F (36.7 °C)-98.4 °F (36.9 °C)] 98.2 °F (36.8 °C)  Pulse:  [] 102  Resp:  [14-26] 20  SpO2:  [94 %-100 %] 98 %  BP: (103-120)/(53-68) 105/53     Weight: 97.5  kg (215 lb)  Body mass index is 33.67 kg/m².    Physical Exam  Vitals and nursing note reviewed.   Constitutional:       General: She is not in acute distress.     Appearance: She is obese. She is ill-appearing.   HENT:      Head: Normocephalic and atraumatic.      Nose: Nose normal. No congestion or rhinorrhea.      Mouth/Throat:      Mouth: Mucous membranes are dry.      Pharynx: No oropharyngeal exudate.   Eyes:      General: No scleral icterus.  Neck:      Vascular: No carotid bruit.   Cardiovascular:      Rate and Rhythm: Regular rhythm. Tachycardia present.      Heart sounds: Murmur heard.     No friction rub. No gallop.   Pulmonary:      Effort: No respiratory distress.      Breath sounds: No stridor. No wheezing, rhonchi or rales.   Chest:      Chest wall: No tenderness.   Abdominal:      General: There is no distension.      Palpations: There is no mass.      Tenderness: There is no abdominal tenderness. There is no right CVA tenderness, left CVA tenderness, guarding or rebound.      Hernia: No hernia is present.   Musculoskeletal:         General: No swelling, tenderness or deformity.      Cervical back: Neck supple. No rigidity or tenderness.      Right lower leg: Edema present.      Left lower leg: Edema present.   Lymphadenopathy:      Cervical: No cervical adenopathy.   Skin:     Capillary Refill: Capillary refill takes less than 2 seconds.      Coloration: Skin is not jaundiced or pale.   Neurological:      Cranial Nerves: No cranial nerve deficit.      Sensory: Sensory deficit present.      Motor: Weakness present.   Psychiatric:      Comments: Calm, cooperative, moving all extremities.           Significant Labs: CBC:   Recent Labs   Lab 12/28/22 0357 12/29/22  0545   WBC 1.74* 2.12*   HGB 8.0* 8.1*   HCT 24.7* 26.0*   PLT 56* 59*       CMP:   Recent Labs   Lab 12/28/22  0357 12/29/22  0545    142   K 4.1 3.8    108   CO2 32* 32*   GLU 88 117*   BUN 6 9   CREATININE 0.4* 0.5   CALCIUM  7.4* 7.5*   PROT 6.1 6.5   ALBUMIN 1.7* 1.7*   BILITOT 1.0 0.7   ALKPHOS 109 116   AST 29 33   ALT 16 16   ANIONGAP 0* 2*       Urine Studies:   No results for input(s): COLORU, APPEARANCEUA, PHUR, SPECGRAV, PROTEINUA, GLUCUA, KETONESU, BILIRUBINUA, OCCULTUA, NITRITE, UROBILINOGEN, LEUKOCYTESUR, RBCUA, WBCUA, BACTERIA, SQUAMEPITHEL, HYALINECASTS in the last 48 hours.    Invalid input(s): TREY      Significant Imaging: I have reviewed all pertinent imaging results/findings within the past 24 hours.

## 2022-12-29 NOTE — PLAN OF CARE
Plan of care reviewed at bedside, verbalized understanding. Pain controlled with PRN orders. Will continue to monitor.

## 2022-12-29 NOTE — PROGRESS NOTES
"[facility] Medicine  Progress Note    Patient Name: Rahcel Zazueta  MRN: [id number]  Patient Class: IP- Inpatient   Admission Date: 12/23/2022  Length of Stay: 6 days  Attending Physician: Hal Barger Jr., MD  Primary Care Provider: Dannielle Merino DO        Subjective:     Principal Problem:Severe sepsis        HPI:  ER HPI:  42-year-old female with a history of anemia, anxiety, recurrent cellulitis, hepatitis C, IVDU, liver cirrhosis, cholecystectomy, kidney stones, lumbar fusion, shoulder surgery, chronic back pain comes in c/o generalized weakness, fatigue, and bilateral flank pain.  She reports that this has been going on intermittently for several days and was diagnosed with a UTI recently.  No fever.  No abdominal pain or vomiting or diarrhea.     IM HPI:  Patient is well known to our service.  Patient has a history of IV drug abuse and unfortunately has relapsed.  Patient has a history of a epidural abscess requiring a large lumbar surgery with multiple rehabilitative stays and therapy.  The patient was progressing to ambulation and recently has declined.  Patient admits to starting drugs including IV drug use again.  Patient presented to the ED with the above symptoms and she has a noticeable cardiac murmur today that we are getting a stat echocardiogram.  Patient's been started on antibiotics fluid resuscitated and seems to have sepsis.  Patient's urinary studies were abnormal but not overwhelming.      Overview/Hospital Course:  12/24/22 CG: Patient remains in the ICU today. Has a lot of muscle spasms and low back pain. Echocardiogram ordered yesterday and completed; significant for pulm HTN but without vegetations.   12/25/22 CG:  Overnight she was having a lot of significant discomfort and pain.  We placed her on Precedex and this has helped significantly with her body aches, her irritability and muscle spasms."  12/26/22 FM:  Patient required Precedex for agitation and " mood changes.  Patient is calm and cooperative this morning.  Patient appears to have E coli sepsis so will taper antibiotics.  Patient's echocardiogram showed no vegetation and E coli would be low risk for metastatic infection.  Will transfer the patient to the floor once her Precedex discontinue we counseled her again today on the importance of avoiding substances and illicit drugs.  This continues to be setback for her.  12/27/22 WC:  Patient overall remains stable and is slowly improving.  Urine culture has revealed Candida albicans in addition to blood culture revealing E coli.  12/28/22 WC:  Patient resting comfortably this morning.  Pharm ID on case for treatment duration recs.    12/29/22 WC:  no acute events overnight.  Back pain at this time.         Past Medical History:   Diagnosis Date    Anemia     Anxiety     Depressed     Diskitis     Hep C w/o coma, chronic     IV drug abuse     Kidney stone     Liver cirrhosis        Past Surgical History:   Procedure Laterality Date    ARTHROTOMY OF SHOULDER Left 11/15/2022    Procedure: ARTHROTOMY, SHOULDER;  Surgeon: Bjorn Hayes MD;  Location: [facility];  Service: Orthopedics;  Laterality: Left;  Left acromioclavicular joint arthrotomy    BACK SURGERY      benine tumor removal      forehead, age 9    CHOLECYSTECTOMY      KIDNEY SURGERY      LUMBAR FUSION Bilateral 3/2/2022    Procedure: FUSION, SPINE, LUMBAR;  Surgeon: Guille Templeton MD;  Location: [address];  Service: Neurosurgery;  Laterality: Bilateral;  [place]IR[place]  T10--Pelvis    REMOVAL OF HARDWARE FROM SPINE N/A 2/21/2022    Procedure: REMOVAL, HARDWARE, SPINE;  Surgeon: Guille Templeton MD;  Location: [facility] OR [address] [place] [address];  Service: Neurosurgery;  Laterality: N/A;  [place]    SPINE SURGERY         Review of patient's allergies indicates:   Allergen Reactions    Bee venom protein (honey bee) Anaphylaxis     Patient reports she is allergic to bee stings.    Naproxen Anaphylaxis     Throat  closing    12-23- Patient reports taking Ibuprofen 200 mg at home without problems. Verified X3. [place]    Wasp sting [allergen ext-venom-honey bee] Anaphylaxis    Adhesive Blisters    Iodine and iodide containing products Hives     Allergic to iodine in seafood only    Shellfish containing products     Nuts [tree nut] Rash       No current facility-administered medications on file prior to encounter.     Current Outpatient Medications on File Prior to Encounter   Medication Sig    acetaminophen (TYLENOL) 325 MG tablet Take 2 tablets (650 mg total) by mouth every 6 (six) hours as needed for Temperature greater than (100.4 F).    albuterol (ACCUNEB) 1.25 mg/3 mL Nebu Take 3 mLs (1.25 mg total) by nebulization every 6 (six) hours as needed (shortness of breath). Rescue    buPROPion (WELLBUTRIN) 100 MG tablet Take 1 tablet (100 mg total) by mouth 2 (two) times daily.    ciprofloxacin HCl (CIPRO) 500 MG tablet Take 500 mg by mouth 2 (two) times daily.    folic acid (FOLVITE) 1 MG tablet Take 1 tablet (1 mg total) by mouth once daily. (Patient not taking: Reported on 11/25/2022)    pantoprazole (PROTONIX) 40 MG tablet Take 1 tablet (40 mg total) by mouth once daily.    pregabalin (LYRICA) 75 MG capsule Take 1 capsule (75 mg total) by mouth 3 (three) times daily.    sulfamethoxazole-trimethoprim 800-160mg (BACTRIM DS) 800-160 mg Tab Take 2 tablets by mouth 2 (two) times daily.    [DISCONTINUED] diclofenac sodium (VOLTAREN) 1 % Gel Apply 2 g topically 4 (four) times daily.     Family History       Problem Relation (Age of Onset)    Cirrhosis Father    Drug abuse Mother, Father    Hepatitis Mother, Father    Liver cancer Mother          Tobacco Use    Smoking status: Some Days     Packs/day: 0.50     Years: 23.00     Pack years: 11.50     Types: Cigarettes    Smokeless tobacco: Never    Tobacco comments:     patient states she knows she nees to quit.   Substance and Sexual Activity    Alcohol use: Not  Currently     Comment: quit 2014    Drug use: Yes     Frequency: 4.0 times per week     Types: Marijuana     Comment: Patient denies needle use, only inhalation of methampehtamines or orally.  Last known drug use was prior to admission to hospital stay for surgery    Sexual activity: Yes     Partners: Male     Birth control/protection: None     Review of Systems   Unable to perform ROS: Acuity of condition   Objective:     Vital Signs (Most Recent):  Temp: 98.2 °F (36.8 °C) (12/29/22 0736)  Pulse: 102 (12/29/22 0738)  Resp: 20 (12/29/22 0736)  BP: (!) 105/53 (12/29/22 0736)  SpO2: 98 % (12/29/22 0736)   Vital Signs (24h Range):  Temp:  [98 °F (36.7 °C)-98.4 °F (36.9 °C)] 98.2 °F (36.8 °C)  Pulse:  [] 102  Resp:  [14-26] 20  SpO2:  [94 %-100 %] 98 %  BP: (103-120)/(53-68) 105/53     Weight: 97.5 kg (215 lb)  Body mass index is 33.67 kg/m².    Physical Exam  Vitals and nursing note reviewed.   Constitutional:       General: She is not in acute distress.     Appearance: She is obese. She is ill-appearing.   HENT:      Head: Normocephalic and atraumatic.      Nose: Nose normal. No congestion or rhinorrhea.      Mouth/Throat:      Mouth: Mucous membranes are dry.      Pharynx: No oropharyngeal exudate.   Eyes:      General: No scleral icterus.  Neck:      Vascular: No carotid bruit.   Cardiovascular:      Rate and Rhythm: Regular rhythm. Tachycardia present.      Heart sounds: Murmur heard.     No friction rub. No gallop.   Pulmonary:      Effort: No respiratory distress.      Breath sounds: No stridor. No wheezing, rhonchi or rales.   Chest:      Chest wall: No tenderness.   Abdominal:      General: There is no distension.      Palpations: There is no mass.      Tenderness: There is no abdominal tenderness. There is no right CVA tenderness, left CVA tenderness, guarding or rebound.      Hernia: No hernia is present.   Musculoskeletal:         General: No swelling, tenderness or deformity.      Cervical back:  Neck supple. No rigidity or tenderness.      Right lower leg: Edema present.      Left lower leg: Edema present.   Lymphadenopathy:      Cervical: No cervical adenopathy.   Skin:     Capillary Refill: Capillary refill takes less than 2 seconds.      Coloration: Skin is not jaundiced or pale.   Neurological:      Cranial Nerves: No cranial nerve deficit.      Sensory: Sensory deficit present.      Motor: Weakness present.   Psychiatric:      Comments: Calm, cooperative, moving all extremities.           Significant Labs: CBC:   Recent Labs   Lab 12/28/22  0357 12/29/22  0545   WBC 1.74* 2.12*   HGB 8.0* 8.1*   HCT 24.7* 26.0*   PLT 56* 59*       CMP:   Recent Labs   Lab 12/28/22  0357 12/29/22  0545    142   K 4.1 3.8    108   CO2 32* 32*   GLU 88 117*   BUN 6 9   CREATININE 0.4* 0.5   CALCIUM 7.4* 7.5*   PROT 6.1 6.5   ALBUMIN 1.7* 1.7*   BILITOT 1.0 0.7   ALKPHOS 109 116   AST 29 33   ALT 16 16   ANIONGAP 0* 2*       Urine Studies:   No results for input(s): COLORU, APPEARANCEUA, PHUR, SPECGRAV, PROTEINUA, GLUCUA, KETONESU, BILIRUBINUA, OCCULTUA, NITRITE, UROBILINOGEN, LEUKOCYTESUR, RBCUA, WBCUA, BACTERIA, SQUAMEPITHEL, HYALINECASTS in the last 48 hours.    Invalid input(s): WRIGHTSUR      Significant Imaging: I have reviewed all pertinent imaging results/findings within the past 24 hours.      Assessment/Plan:      * Severe sepsis  This patient does have evidence of infective focus  My overall impression is sepsis. Vital signs were reviewed and noted in progress note.  Antibiotics given-   Antibiotics (From admission, onward)    Start     Stop Route Frequency Ordered    12/26/22 1515  meropenem-0.9% sodium chloride 1 g/50 mL IVPB        See Hyperspace for full Linked Orders Report.    -- IV Every 8 hours (non-standard times) 12/26/22 1409    12/26/22 1500  meropenem-0.9% sodium chloride 1 g/50 mL IVPB        See Hyperspace for full Linked Orders Report.    -- IV Every 8 hours (non-standard times)  12/26/22 1409    12/23/22 0533  vancomycin (VANCOCIN) 1,000 mg injection        Note to Pharmacy: Created by cabinet override    12/23 1744   12/23/22 0533    12/23/22 0405  piperacillin-tazobactam (ZOSYN) 4.5 gram injection        Note to Pharmacy: Created by cabinet override    12/23 1614   12/23/22 0405        Cultures were taken-   Microbiology Results (last 7 days)     Procedure Component Value Units Date/Time    Blood culture [[id number]] Collected: 12/28/22 1058    Order Status: Completed Specimen: Blood Updated: 12/29/22 0115     Blood Culture, Routine No Growth to date    Blood culture [[id number]] Collected: 12/28/22 1058    Order Status: Completed Specimen: Blood Updated: 12/29/22 0115     Blood Culture, Routine No Growth to date    Blood culture #2 **CANNOT BE ORDERED STAT** [[id number]] Collected: 12/23/22 0257    Order Status: Completed Specimen: Blood Updated: 12/28/22 2022     Blood Culture, Routine No growth after 5 days.    Urine culture [[id number]]  (Abnormal) Collected: 12/23/22 0501    Order Status: Completed Specimen: Urine Updated: 12/26/22 1250     Urine Culture, Routine JACINTA ALBICANS  50,000 - 99,999 cfu/ml  Treatment of asymptomatic candiduria is not recommended (except for   specific populations). Candida isolated in the urine typically   represents colonization. If an indwelling urinary catheter is present  it should be removed or replaced.      Narrative:      Preferred Collection Type->Urine, Clean Catch  Specimen Source->Urine    Blood culture #1 **CANNOT BE ORDERED STAT** [[id number]]  (Abnormal)  (Susceptibility) Collected: 12/23/22 0258    Order Status: Completed Specimen: Blood Updated: 12/26/22 1037     Blood Culture, Routine Gram stain aer bottle: Gram negative rods      Gram stain merissa bottle: Gram negative rods      Results called to and read back by:Sari Deng RN 12/24/2022 05:57      ESCHERICHIA COLI ESBL        Latest lactate reviewed, they are-  No results for  input(s): LACTATE in the last 72 hours.    Organ dysfunction indicated by Acute kidney injury, Encephalopathy  and Acute liver injury  Source- ?    Source control Achieved by- see orders      Pulmonary hypertension  Found incidentally on echocardiogram with evidence of elevated right sided pressures and dilated heart chambers on the right side. Unclear the exact etiology or class of Pulm HTN but given her significant substance abuse Class 1 certainly is a possibility.   - Will refer to outpatient pulmonologist.   - Will consider a cariology consult while inpatient to see if they think it would be beneficial to have a heart cath done while inpatient      Murmur, cardiac  No evidence of vegetations    Mild episode of recurrent major depressive disorder  Patient with altered mental status holding antidepressants for now      Cannabis use disorder, moderate, dependence  As above      Amphetamine use disorder, severe  Continue to  educate and support      Spinal stenosis at L4-L5 level        Substance use disorder        Chronic hepatitis C with cirrhosis  Labs noted      Cirrhosis of liver without ascites  Labs noted    AST   Date Value Ref Range Status   12/29/2022 33 10 - 40 U/L Final   12/28/2022 29 10 - 40 U/L Final   12/27/2022 29 10 - 40 U/L Final   12/25/2022 31 10 - 40 U/L Final   12/24/2022 29 10 - 40 U/L Final     ALT   Date Value Ref Range Status   12/29/2022 16 10 - 44 U/L Final   12/28/2022 16 10 - 44 U/L Final   12/27/2022 14 10 - 44 U/L Final   12/25/2022 15 10 - 44 U/L Final   12/24/2022 14 10 - 44 U/L Final        Alkaline Phosphatase   Date Value Ref Range Status   12/29/2022 116 55 - 135 U/L Final   12/28/2022 109 55 - 135 U/L Final   12/27/2022 115 55 - 135 U/L Final   12/25/2022 111 55 - 135 U/L Final   12/24/2022 111 55 - 135 U/L Final            UTI (urinary tract infection)  On abx, ? Source.  Suspect source of sepsis.    Treat candida x 2 weeks given hx.      VTE Risk Mitigation (From  admission, onward)         Ordered     IP VTE HIGH RISK PATIENT  Once         12/23/22 0647     Place sequential compression device  Until discontinued         12/23/22 0647                Discharge Planning   SHIRA:      Code Status: Full Code   Is the patient medically ready for discharge?:     Reason for patient still in hospital (select all that apply): Treatment  Discharge Plan A: Home with family                  Hal Barger Jr, MD  Department of Hospital Medicine   [facility]

## 2022-12-29 NOTE — PLAN OF CARE
Discussed with Dr. Barger plan of care on this patient. Patient will require 7 days of IV abx starting 12/27/2022 as day 1 to cover ESBL E-Coli.  Patient not a candidate for home IV abx.  Patient to discharge once complete course of IV abx is completed.

## 2022-12-30 LAB
ALBUMIN SERPL BCP-MCNC: 1.7 G/DL (ref 3.5–5.2)
ALP SERPL-CCNC: 117 U/L (ref 55–135)
ALT SERPL W/O P-5'-P-CCNC: 16 U/L (ref 10–44)
ANION GAP SERPL CALC-SCNC: 1 MMOL/L (ref 8–16)
AST SERPL-CCNC: 35 U/L (ref 10–40)
BASOPHILS # BLD AUTO: 0.01 K/UL (ref 0–0.2)
BASOPHILS NFR BLD: 0.6 % (ref 0–1.9)
BILIRUB SERPL-MCNC: 0.8 MG/DL (ref 0.1–1)
BUN SERPL-MCNC: 7 MG/DL (ref 6–20)
CALCIUM SERPL-MCNC: 7.5 MG/DL (ref 8.7–10.5)
CHLORIDE SERPL-SCNC: 108 MMOL/L (ref 95–110)
CO2 SERPL-SCNC: 33 MMOL/L (ref 23–29)
CREAT SERPL-MCNC: 0.4 MG/DL (ref 0.5–1.4)
DIFFERENTIAL METHOD: ABNORMAL
EOSINOPHIL # BLD AUTO: 0.1 K/UL (ref 0–0.5)
EOSINOPHIL NFR BLD: 3.6 % (ref 0–8)
ERYTHROCYTE [DISTWIDTH] IN BLOOD BY AUTOMATED COUNT: 17.4 % (ref 11.5–14.5)
EST. GFR  (NO RACE VARIABLE): >60 ML/MIN/1.73 M^2
GLUCOSE SERPL-MCNC: 101 MG/DL (ref 70–110)
HCT VFR BLD AUTO: 25.5 % (ref 37–48.5)
HGB BLD-MCNC: 7.9 G/DL (ref 12–16)
IMM GRANULOCYTES # BLD AUTO: 0.01 K/UL (ref 0–0.04)
IMM GRANULOCYTES NFR BLD AUTO: 0.6 % (ref 0–0.5)
LYMPHOCYTES # BLD AUTO: 0.4 K/UL (ref 1–4.8)
LYMPHOCYTES NFR BLD: 23.6 % (ref 18–48)
MAGNESIUM SERPL-MCNC: 2 MG/DL (ref 1.6–2.6)
MCH RBC QN AUTO: 27.7 PG (ref 27–31)
MCHC RBC AUTO-ENTMCNC: 31 G/DL (ref 32–36)
MCV RBC AUTO: 90 FL (ref 82–98)
MONOCYTES # BLD AUTO: 0.2 K/UL (ref 0.3–1)
MONOCYTES NFR BLD: 11.5 % (ref 4–15)
NEUTROPHILS # BLD AUTO: 1 K/UL (ref 1.8–7.7)
NEUTROPHILS NFR BLD: 60.1 % (ref 38–73)
NRBC BLD-RTO: 0 /100 WBC
PLATELET # BLD AUTO: 66 K/UL (ref 150–450)
PMV BLD AUTO: 8.9 FL (ref 9.2–12.9)
POTASSIUM SERPL-SCNC: 3.8 MMOL/L (ref 3.5–5.1)
PROT SERPL-MCNC: 6.3 G/DL (ref 6–8.4)
RBC # BLD AUTO: 2.85 M/UL (ref 4–5.4)
SODIUM SERPL-SCNC: 142 MMOL/L (ref 136–145)
WBC # BLD AUTO: 1.65 K/UL (ref 3.9–12.7)

## 2022-12-30 PROCEDURE — 83735 ASSAY OF MAGNESIUM: CPT | Performed by: INTERNAL MEDICINE

## 2022-12-30 PROCEDURE — 11000001 HC ACUTE MED/SURG PRIVATE ROOM

## 2022-12-30 PROCEDURE — 97110 THERAPEUTIC EXERCISES: CPT | Mod: CO

## 2022-12-30 PROCEDURE — 63600175 PHARM REV CODE 636 W HCPCS: Performed by: INTERNAL MEDICINE

## 2022-12-30 PROCEDURE — 25000003 PHARM REV CODE 250: Performed by: INTERNAL MEDICINE

## 2022-12-30 PROCEDURE — 85025 COMPLETE CBC W/AUTO DIFF WBC: CPT | Performed by: INTERNAL MEDICINE

## 2022-12-30 PROCEDURE — 63700000 PHARM REV CODE 250 ALT 637 W/O HCPCS: Performed by: INTERNAL MEDICINE

## 2022-12-30 PROCEDURE — 97530 THERAPEUTIC ACTIVITIES: CPT

## 2022-12-30 PROCEDURE — 36415 COLL VENOUS BLD VENIPUNCTURE: CPT | Performed by: INTERNAL MEDICINE

## 2022-12-30 PROCEDURE — 27000207 HC ISOLATION

## 2022-12-30 PROCEDURE — 80053 COMPREHEN METABOLIC PANEL: CPT | Performed by: INTERNAL MEDICINE

## 2022-12-30 PROCEDURE — 97535 SELF CARE MNGMENT TRAINING: CPT | Mod: CO

## 2022-12-30 PROCEDURE — A4216 STERILE WATER/SALINE, 10 ML: HCPCS | Performed by: INTERNAL MEDICINE

## 2022-12-30 RX ADMIN — Medication 10 ML: at 12:12

## 2022-12-30 RX ADMIN — HYDROCODONE BITARTRATE AND ACETAMINOPHEN 1 TABLET: 5; 325 TABLET ORAL at 03:12

## 2022-12-30 RX ADMIN — PANTOPRAZOLE SODIUM 40 MG: 40 TABLET, DELAYED RELEASE ORAL at 09:12

## 2022-12-30 RX ADMIN — MEROPENEM AND SODIUM CHLORIDE 1 G: 1 INJECTION, SOLUTION INTRAVENOUS at 06:12

## 2022-12-30 RX ADMIN — PREGABALIN 75 MG: 75 CAPSULE ORAL at 08:12

## 2022-12-30 RX ADMIN — Medication 10 ML: at 06:12

## 2022-12-30 RX ADMIN — FLUCONAZOLE 200 MG: 100 TABLET ORAL at 09:12

## 2022-12-30 RX ADMIN — METHOCARBAMOL TABLETS 500 MG: 500 TABLET, COATED ORAL at 09:12

## 2022-12-30 RX ADMIN — MEROPENEM AND SODIUM CHLORIDE 1 G: 1 INJECTION, SOLUTION INTRAVENOUS at 11:12

## 2022-12-30 RX ADMIN — METHOCARBAMOL TABLETS 500 MG: 500 TABLET, COATED ORAL at 06:12

## 2022-12-30 RX ADMIN — MEROPENEM AND SODIUM CHLORIDE 1 G: 1 INJECTION, SOLUTION INTRAVENOUS at 07:12

## 2022-12-30 RX ADMIN — MEROPENEM AND SODIUM CHLORIDE 1 G: 1 INJECTION, SOLUTION INTRAVENOUS at 04:12

## 2022-12-30 RX ADMIN — MORPHINE SULFATE 2 MG: 2 INJECTION, SOLUTION INTRAMUSCULAR; INTRAVENOUS at 12:12

## 2022-12-30 RX ADMIN — METHOCARBAMOL TABLETS 500 MG: 500 TABLET, COATED ORAL at 01:12

## 2022-12-30 RX ADMIN — HYDROCODONE BITARTRATE AND ACETAMINOPHEN 1 TABLET: 5; 325 TABLET ORAL at 11:12

## 2022-12-30 RX ADMIN — MEROPENEM AND SODIUM CHLORIDE 1 G: 1 INJECTION, SOLUTION INTRAVENOUS at 03:12

## 2022-12-30 RX ADMIN — Medication 10 ML: at 05:12

## 2022-12-30 RX ADMIN — PREGABALIN 75 MG: 75 CAPSULE ORAL at 09:12

## 2022-12-30 RX ADMIN — POLYETHYLENE GLYCOL (3350) 17 G: 17 POWDER, FOR SOLUTION ORAL at 09:12

## 2022-12-30 RX ADMIN — HYDROCODONE BITARTRATE AND ACETAMINOPHEN 1 TABLET: 5; 325 TABLET ORAL at 05:12

## 2022-12-30 RX ADMIN — METHOCARBAMOL TABLETS 500 MG: 500 TABLET, COATED ORAL at 08:12

## 2022-12-30 RX ADMIN — Medication 10 ML: at 11:12

## 2022-12-30 RX ADMIN — ALPRAZOLAM 0.5 MG: 0.5 TABLET ORAL at 02:12

## 2022-12-30 RX ADMIN — MORPHINE SULFATE 2 MG: 2 INJECTION, SOLUTION INTRAMUSCULAR; INTRAVENOUS at 08:12

## 2022-12-30 RX ADMIN — MORPHINE SULFATE 2 MG: 2 INJECTION, SOLUTION INTRAMUSCULAR; INTRAVENOUS at 10:12

## 2022-12-30 NOTE — PT/OT/SLP PROGRESS
Physical Therapy Treatment    Patient Name:  Rachel Zazueta   MRN:  [id number]    Recommendations:     Discharge Recommendations:  (to be determined)  Discharge Equipment Recommendations:  (TBD)  Barriers to discharge:  decreased fxn'l mobility    Assessment:     Rachel Zazueta is a 42 y.o. female admitted with a medical diagnosis of Severe sepsis.  She presents with the following impairments/functional limitations: weakness, impaired endurance, impaired functional mobility, impaired balance, decreased coordination, decreased lower extremity function, pain, decreased ROM . Pain limited her today and she had what looked like a panic attack    Rehab Prognosis: fair patient would benefit from acute skilled PT services to address these deficits and reach maximum level of function.    Recent Surgery: * No surgery found *      Plan:     During this hospitalization, patient to be seen 5 x/week to address the identified rehab impairments via gait training, therapeutic activities, therapeutic exercises and progress toward the following goals:    Plan of Care Expires:  01/09/23    Subjective     Chief Complaint: back pain  Patient/Family Comments/goals:  Pain/Comfort:         Objective:     Communicated with nurse prior to session.  Patient found supine with   upon PT entry to room.     General Precautions: Standard, fall  Orthopedic Precautions:  (history of back surgery)  Braces: N/A  Respiratory Status: Room air     Functional Mobility:  Pt was moda with supine to sit, scooting, and sit to supine. Pt's back started to hurt really bad. She sat at edge of bed, but she started having a panic attack and she went back in supine position      AM-PAC 6 CLICK MOBILITY          Treatment & Education:    Patient left supine with call button in reach..    GOALS:   Multidisciplinary Problems       Physical Therapy Goals          Problem: Physical Therapy    Goal Priority Disciplines Outcome Goal Variances Interventions   Physical  Therapy Goal     PT, PT/OT      Description: Goals to be met by: 2023     Patient will increase functional independence with mobility by performin. Supine to sit with Modified [place]  2. Sit to supine with Modified [place]  3. Sit to stand transfer with Stand-by Assistance  4. Bed to chair transfer with Stand-by Assistance using Rolling Walker  5. Gait  x 50 feet with Contact Guard Assistance using Rolling Walker.   6. Sitting at edge of bed x10 minutes with [place] to perform (B) LE therex                           Time Tracking:     PT Received On:    PT Start Time:       PT Stop Time:    PT Total Time (min):       Billable Minutes: Therapeutic Activity 15                   2022

## 2022-12-30 NOTE — PROGRESS NOTES
"[facility] Medicine  Progress Note    Patient Name: Rachel Zazueta  MRN: [id number]  Patient Class: IP- Inpatient   Admission Date: 12/23/2022  Length of Stay: 7 days  Attending Physician: Hal Barger Jr., MD  Primary Care Provider: Dannielle Merino DO        Subjective:     Principal Problem:Severe sepsis        HPI:  ER HPI:  42-year-old female with a history of anemia, anxiety, recurrent cellulitis, hepatitis C, IVDU, liver cirrhosis, cholecystectomy, kidney stones, lumbar fusion, shoulder surgery, chronic back pain comes in c/o generalized weakness, fatigue, and bilateral flank pain.  She reports that this has been going on intermittently for several days and was diagnosed with a UTI recently.  No fever.  No abdominal pain or vomiting or diarrhea.     IM HPI:  Patient is well known to our service.  Patient has a history of IV drug abuse and unfortunately has relapsed.  Patient has a history of a epidural abscess requiring a large lumbar surgery with multiple rehabilitative stays and therapy.  The patient was progressing to ambulation and recently has declined.  Patient admits to starting drugs including IV drug use again.  Patient presented to the ED with the above symptoms and she has a noticeable cardiac murmur today that we are getting a stat echocardiogram.  Patient's been started on antibiotics fluid resuscitated and seems to have sepsis.  Patient's urinary studies were abnormal but not overwhelming.      Overview/Hospital Course:  12/24/22 CG: Patient remains in the ICU today. Has a lot of muscle spasms and low back pain. Echocardiogram ordered yesterday and completed; significant for pulm HTN but without vegetations.   12/25/22 CG:  Overnight she was having a lot of significant discomfort and pain.  We placed her on Precedex and this has helped significantly with her body aches, her irritability and muscle spasms."  12/26/22 FM:  Patient required Precedex for agitation and " mood changes.  Patient is calm and cooperative this morning.  Patient appears to have E coli sepsis so will taper antibiotics.  Patient's echocardiogram showed no vegetation and E coli would be low risk for metastatic infection.  Will transfer the patient to the floor once her Precedex discontinue we counseled her again today on the importance of avoiding substances and illicit drugs.  This continues to be setback for her.  12/27/22 WC:  Patient overall remains stable and is slowly improving.  Urine culture has revealed Candida albicans in addition to blood culture revealing E coli.  12/28/22 WC:  Patient resting comfortably this morning.  Pharm ID on case for treatment duration recs.    12/29/22 WC:  no acute events overnight.  Back pain at this time.   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9    CHOLECYSTECTOMY      KIDNEY SURGERY      LUMBAR FUSION Bilateral 3/2/2022    Procedure: FUSION, SPINE, LUMBAR;  Surgeon: Guille Templeton MD;  Location: [address];  Service: Neurosurgery;  Laterality: Bilateral;  AIRO  T10--Pelvis    REMOVAL OF HARDWARE FROM SPINE N/A 2/21/2022    Procedure: REMOVAL, HARDWARE, SPINE;  Surgeon: Guille Templeton MD;  Location: [address];  Service: Neurosurgery;  Laterality: N/A;  [place]    SPINE SURGERY         Review of patient's allergies indicates:   Allergen Reactions    Bee venom protein (honey bee) Anaphylaxis     Patient reports she is allergic to  bee stings.    Naproxen Anaphylaxis     Throat closing    12-23- Patient reports taking Ibuprofen 200 mg at home without problems. Verified X3. [place]    Wasp sting [allergen ext-venom-honey bee] Anaphylaxis    Adhesive Blisters    Iodine and iodide containing products Hives     Allergic to iodine in seafood only    Shellfish containing products     Nuts [tree nut] Rash       No current facility-administered medications on file prior to encounter.     Current Outpatient Medications on File Prior to Encounter   Medication Sig    acetaminophen (TYLENOL) 325 MG tablet Take 2 tablets (650 mg total) by mouth every 6 (six) hours as needed for Temperature greater than (100.4 F).    albuterol (ACCUNEB) 1.25 mg/3 mL Nebu Take 3 mLs (1.25 mg total) by nebulization every 6 (six) hours as needed (shortness of breath). Rescue    buPROPion (WELLBUTRIN) 100 MG tablet Take 1 tablet (100 mg total) by mouth 2 (two) times daily.    ciprofloxacin HCl (CIPRO) 500 MG tablet Take 500 mg by mouth 2 (two) times daily.    folic acid (FOLVITE) 1 MG tablet Take 1 tablet (1 mg total) by mouth once daily. (Patient not taking: Reported on 11/25/2022)    pantoprazole (PROTONIX) 40 MG tablet Take 1 tablet (40 mg total) by mouth once daily.    pregabalin (LYRICA) 75 MG capsule Take 1 capsule (75 mg total) by mouth 3 (three) times daily.    sulfamethoxazole-trimethoprim 800-160mg (BACTRIM DS) 800-160 mg Tab Take 2 tablets by mouth 2 (two) times daily.    [DISCONTINUED] diclofenac sodium (VOLTAREN) 1 % Gel Apply 2 g topically 4 (four) times daily.     Family History       Problem Relation (Age of Onset)    Cirrhosis Father    Drug abuse Mother, Father    Hepatitis Mother, Father    Liver cancer Mother          Tobacco Use    Smoking status: Some Days     Packs/day: 0.50     Years: 23.00     Pack years: 11.50     Types: Cigarettes    Smokeless tobacco: Never    Tobacco comments:     patient states she knows she nees to quit.   Substance  and Sexual Activity    Alcohol use: Not Currently     Comment: quit 2014    Drug use: Yes     Frequency: 4.0 times per week     Types: Marijuana     Comment: Patient denies needle use, only inhalation of methampehtamines or orally.  Last known drug use was prior to admission to hospital stay for surgery    Sexual activity: Yes     Partners: Male     Birth control/protection: None     Review of Systems   Unable to perform ROS: Acuity of condition   Objective:     Vital Signs (Most Recent):  Temp: 98.2 °F (36.8 °C) (12/30/22 0722)  Pulse: 97 (12/30/22 0722)  Resp: 18 (12/30/22 0722)  BP: 121/66 (12/30/22 0722)  SpO2: 96 % (12/30/22 0722)   Vital Signs (24h Range):  Temp:  [97.9 °F (36.6 °C)-98.3 °F (36.8 °C)] 98.2 °F (36.8 °C)  Pulse:  [] 97  Resp:  [18-20] 18  SpO2:  [96 %-100 %] 96 %  BP: (109-125)/(57-66) 121/66     Weight: 97.5 kg (215 lb)  Body mass index is 33.67 kg/m².    Physical Exam  Vitals and nursing note reviewed.   Constitutional:       General: She is not in acute distress.     Appearance: She is obese. She is ill-appearing.   HENT:      Head: Normocephalic and atraumatic.      Nose: Nose normal. No congestion or rhinorrhea.      Mouth/Throat:      Mouth: Mucous membranes are dry.      Pharynx: No oropharyngeal exudate.   Eyes:      General: No scleral icterus.  Neck:      Vascular: No carotid bruit.   Cardiovascular:      Rate and Rhythm: Regular rhythm. Tachycardia present.      Heart sounds: Murmur heard.     No friction rub. No gallop.   Pulmonary:      Effort: No respiratory distress.      Breath sounds: No stridor. No wheezing, rhonchi or rales.   Chest:      Chest wall: No tenderness.   Abdominal:      General: There is no distension.      Palpations: There is no mass.      Tenderness: There is no abdominal tenderness. There is no right CVA tenderness, left CVA tenderness, guarding or rebound.      Hernia: No hernia is present.   Musculoskeletal:         General: No swelling, tenderness  or deformity.      Cervical back: Neck supple. No rigidity or tenderness.      Right lower leg: Edema present.      Left lower leg: Edema present.   Lymphadenopathy:      Cervical: No cervical adenopathy.   Skin:     Capillary Refill: Capillary refill takes less than 2 seconds.      Coloration: Skin is not jaundiced or pale.   Neurological:      Cranial Nerves: No cranial nerve deficit.      Sensory: Sensory deficit present.      Motor: Weakness present.   Psychiatric:      Comments: Calm, cooperative, moving all extremities.           Significant Labs: CBC:   Recent Labs   Lab 12/29/22  0545 12/30/22  0616   WBC 2.12* 1.65*   HGB 8.1* 7.9*   HCT 26.0* 25.5*   PLT 59* 66*       CMP:   Recent Labs   Lab 12/29/22  0545 12/30/22  0616    142   K 3.8 3.8    108   CO2 32* 33*   * 101   BUN 9 7   CREATININE 0.5 0.4*   CALCIUM 7.5* 7.5*   PROT 6.5 6.3   ALBUMIN 1.7* 1.7*   BILITOT 0.7 0.8   ALKPHOS 116 117   AST 33 35   ALT 16 16   ANIONGAP 2* 1*       Urine Studies:   No results for input(s): COLORU, APPEARANCEUA, PHUR, SPECGRAV, PROTEINUA, GLUCUA, KETONESU, BILIRUBINUA, OCCULTUA, NITRITE, UROBILINOGEN, LEUKOCYTESUR, RBCUA, WBCUA, BACTERIA, SQUAMEPITHEL, HYALINECASTS in the last 48 hours.    Invalid input(s): WRIGHTSUR      Significant Imaging: I have reviewed all pertinent imaging results/findings within the past 24 hours.      Assessment/Plan:      * Severe sepsis  This patient does have evidence of infective focus  My overall impression is sepsis. Vital signs were reviewed and noted in progress note.  Antibiotics given-   Antibiotics (From admission, onward)    Start     Stop Route Frequency Ordered    12/26/22 1515  meropenem-0.9% sodium chloride 1 g/50 mL IVPB        See Hyperspace for full Linked Orders Report.    -- IV Every 8 hours (non-standard times) 12/26/22 1409    12/26/22 1500  meropenem-0.9% sodium chloride 1 g/50 mL IVPB        See Hyperspace for full Linked Orders Report.    -- IV Every  8 hours (non-standard times) 12/26/22 1409    12/23/22 0533  vancomycin (VANCOCIN) 1,000 mg injection        Note to Pharmacy: Created by cabinet override    12/23 1744   12/23/22 0533    12/23/22 0405  piperacillin-tazobactam (ZOSYN) 4.5 gram injection        Note to Pharmacy: Created by cabinet override    12/23 1614   12/23/22 0405        Cultures were taken-   Microbiology Results (last 7 days)     Procedure Component Value Units Date/Time    Blood culture [[id number]] Collected: 12/28/22 1058    Order Status: Completed Specimen: Blood Updated: 12/29/22 1812     Blood Culture, Routine No Growth to date      No Growth to date    Blood culture [[id number]] Collected: 12/28/22 1058    Order Status: Completed Specimen: Blood Updated: 12/29/22 1812     Blood Culture, Routine No Growth to date      No Growth to date    Blood culture #2 **CANNOT BE ORDERED STAT** [[id number]] Collected: 12/23/22 0257    Order Status: Completed Specimen: Blood Updated: 12/28/22 2022     Blood Culture, Routine No growth after 5 days.    Urine culture [[id number]]  (Abnormal) Collected: 12/23/22 0501    Order Status: Completed Specimen: Urine Updated: 12/26/22 1250     Urine Culture, Routine JACINTA ALBICANS  50,000 - 99,999 cfu/ml  Treatment of asymptomatic candiduria is not recommended (except for   specific populations). Candida isolated in the urine typically   represents colonization. If an indwelling urinary catheter is present  it should be removed or replaced.      Narrative:      Preferred Collection Type->Urine, Clean Catch  Specimen Source->Urine    Blood culture #1 **CANNOT BE ORDERED STAT** [[id number]]  (Abnormal)  (Susceptibility) Collected: 12/23/22 0258    Order Status: Completed Specimen: Blood Updated: 12/26/22 1037     Blood Culture, Routine Gram stain aer bottle: Gram negative rods      Gram stain merissa bottle: Gram negative rods      Results called to and read back by:Sari Deng RN 12/24/2022 05:57      ESCHERICHIA  COLI ESBL        Latest lactate reviewed, they are-  No results for input(s): LACTATE in the last 72 hours.    Organ dysfunction indicated by Acute kidney injury, Encephalopathy  and Acute liver injury  Source- ?    Source control Achieved by- see orders    Merrem day 4 of 7      Pulmonary hypertension  Found incidentally on echocardiogram with evidence of elevated right sided pressures and dilated heart chambers on the right side. Unclear the exact etiology or class of Pulm HTN but given her significant substance abuse Class 1 certainly is a possibility.   - Will refer to outpatient pulmonologist.   - Will consider a cariology consult while inpatient to see if they think it would be beneficial to have a heart cath done while inpatient      Murmur, cardiac  No evidence of vegetations    Mild episode of recurrent major depressive disorder  Patient with altered mental status holding antidepressants for now      Cannabis use disorder, moderate, dependence  As above      Amphetamine use disorder, severe  Continue to  educate and support      Spinal stenosis at L4-L5 level        Substance use disorder        Chronic hepatitis C with cirrhosis  Labs noted      Cirrhosis of liver without ascites  Labs noted    AST   Date Value Ref Range Status   12/30/2022 35 10 - 40 U/L Final   12/29/2022 33 10 - 40 U/L Final   12/28/2022 29 10 - 40 U/L Final   12/27/2022 29 10 - 40 U/L Final   12/25/2022 31 10 - 40 U/L Final     ALT   Date Value Ref Range Status   12/30/2022 16 10 - 44 U/L Final   12/29/2022 16 10 - 44 U/L Final   12/28/2022 16 10 - 44 U/L Final   12/27/2022 14 10 - 44 U/L Final   12/25/2022 15 10 - 44 U/L Final        Alkaline Phosphatase   Date Value Ref Range Status   12/30/2022 117 55 - 135 U/L Final   12/29/2022 116 55 - 135 U/L Final   12/28/2022 109 55 - 135 U/L Final   12/27/2022 115 55 - 135 U/L Final   12/25/2022 111 55 - 135 U/L Final            UTI (urinary tract infection)  On abx, ? Source.  Suspect  source of sepsis.    Treat candida x 2 weeks given hx.      VTE Risk Mitigation (From admission, onward)         Ordered     IP VTE HIGH RISK PATIENT  Once         12/23/22 0647     Place sequential compression device  Until discontinued         12/23/22 0647                Discharge Planning   SHIRA:      Code Status: Full Code   Is the patient medically ready for discharge?:     Reason for patient still in hospital (select all that apply): Treatment  Discharge Plan A: Home with family                  Hal Barger Jr, MD  Department of Hospital Medicine   [facility]

## 2022-12-30 NOTE — SUBJECTIVE & OBJECTIVE
Past Medical History:   Diagnosis Date    Anemia     Anxiety     Depressed     Diskitis     Hep C w/o coma, chronic     IV drug abuse     Kidney stone     Liver cirrhosis        Past Surgical History:   Procedure Laterality Date    ARTHROTOMY OF SHOULDER Left 11/15/2022    Procedure: ARTHROTOMY, SHOULDER;  Surgeon: Bjorn Hayes MD;  Location: [facility];  Service: Orthopedics;  Laterality: Left;  Left acromioclavicular joint arthrotomy    BACK SURGERY      benine tumor removal      forehead, age 9    CHOLECYSTECTOMY      KIDNEY SURGERY      LUMBAR FUSION Bilateral 3/2/2022    Procedure: FUSION, SPINE, LUMBAR;  Surgeon: Guille Templeton MD;  Location: [address];  Service: Neurosurgery;  Laterality: Bilateral;  AIRO  T10--Pelvis    REMOVAL OF HARDWARE FROM SPINE N/A 2/21/2022    Procedure: REMOVAL, HARDWARE, SPINE;  Surgeon: Guille Templeton MD;  Location: [address];  Service: Neurosurgery;  Laterality: N/A;  [place]    SPINE SURGERY         Review of patient's allergies indicates:   Allergen Reactions    Bee venom protein (honey bee) Anaphylaxis     Patient reports she is allergic to bee stings.    Naproxen Anaphylaxis     Throat closing    12-23- Patient reports taking Ibuprofen 200 mg at home without problems. Verified X3. [place]    Wasp sting [allergen ext-venom-honey bee] Anaphylaxis    Adhesive Blisters    Iodine and iodide containing products Hives     Allergic to iodine in seafood only    Shellfish containing products     Nuts [tree nut] Rash       No current facility-administered medications on file prior to encounter.     Current Outpatient Medications on File Prior to Encounter   Medication Sig    acetaminophen (TYLENOL) 325 MG tablet Take 2 tablets (650 mg total) by mouth every 6 (six) hours as needed for Temperature greater than (100.4 F).    albuterol (ACCUNEB) 1.25 mg/3 mL Nebu Take 3 mLs (1.25 mg total) by nebulization every 6 (six) hours as needed (shortness of breath). Rescue    buPROPion  (WELLBUTRIN) 100 MG tablet Take 1 tablet (100 mg total) by mouth 2 (two) times daily.    ciprofloxacin HCl (CIPRO) 500 MG tablet Take 500 mg by mouth 2 (two) times daily.    folic acid (FOLVITE) 1 MG tablet Take 1 tablet (1 mg total) by mouth once daily. (Patient not taking: Reported on 11/25/2022)    pantoprazole (PROTONIX) 40 MG tablet Take 1 tablet (40 mg total) by mouth once daily.    pregabalin (LYRICA) 75 MG capsule Take 1 capsule (75 mg total) by mouth 3 (three) times daily.    sulfamethoxazole-trimethoprim 800-160mg (BACTRIM DS) 800-160 mg Tab Take 2 tablets by mouth 2 (two) times daily.    [DISCONTINUED] diclofenac sodium (VOLTAREN) 1 % Gel Apply 2 g topically 4 (four) times daily.     Family History       Problem Relation (Age of Onset)    Cirrhosis Father    Drug abuse Mother, Father    Hepatitis Mother, Father    Liver cancer Mother          Tobacco Use    Smoking status: Some Days     Packs/day: 0.50     Years: 23.00     Pack years: 11.50     Types: Cigarettes    Smokeless tobacco: Never    Tobacco comments:     patient states she knows she nees to quit.   Substance and Sexual Activity    Alcohol use: Not Currently     Comment: quit 2014    Drug use: Yes     Frequency: 4.0 times per week     Types: Marijuana     Comment: Patient denies needle use, only inhalation of methampehtamines or orally.  Last known drug use was prior to admission to hospital stay for surgery    Sexual activity: Yes     Partners: Male     Birth control/protection: None     Review of Systems   Unable to perform ROS: Acuity of condition   Objective:     Vital Signs (Most Recent):  Temp: 98.2 °F (36.8 °C) (12/30/22 0722)  Pulse: 97 (12/30/22 0722)  Resp: 18 (12/30/22 0722)  BP: 121/66 (12/30/22 0722)  SpO2: 96 % (12/30/22 0722)   Vital Signs (24h Range):  Temp:  [97.9 °F (36.6 °C)-98.3 °F (36.8 °C)] 98.2 °F (36.8 °C)  Pulse:  [] 97  Resp:  [18-20] 18  SpO2:  [96 %-100 %] 96 %  BP: (109-125)/(57-66) 121/66     Weight: 97.5 kg  (215 lb)  Body mass index is 33.67 kg/m².    Physical Exam  Vitals and nursing note reviewed.   Constitutional:       General: She is not in acute distress.     Appearance: She is obese. She is ill-appearing.   HENT:      Head: Normocephalic and atraumatic.      Nose: Nose normal. No congestion or rhinorrhea.      Mouth/Throat:      Mouth: Mucous membranes are dry.      Pharynx: No oropharyngeal exudate.   Eyes:      General: No scleral icterus.  Neck:      Vascular: No carotid bruit.   Cardiovascular:      Rate and Rhythm: Regular rhythm. Tachycardia present.      Heart sounds: Murmur heard.     No friction rub. No gallop.   Pulmonary:      Effort: No respiratory distress.      Breath sounds: No stridor. No wheezing, rhonchi or rales.   Chest:      Chest wall: No tenderness.   Abdominal:      General: There is no distension.      Palpations: There is no mass.      Tenderness: There is no abdominal tenderness. There is no right CVA tenderness, left CVA tenderness, guarding or rebound.      Hernia: No hernia is present.   Musculoskeletal:         General: No swelling, tenderness or deformity.      Cervical back: Neck supple. No rigidity or tenderness.      Right lower leg: Edema present.      Left lower leg: Edema present.   Lymphadenopathy:      Cervical: No cervical adenopathy.   Skin:     Capillary Refill: Capillary refill takes less than 2 seconds.      Coloration: Skin is not jaundiced or pale.   Neurological:      Cranial Nerves: No cranial nerve deficit.      Sensory: Sensory deficit present.      Motor: Weakness present.   Psychiatric:      Comments: Calm, cooperative, moving all extremities.           Significant Labs: CBC:   Recent Labs   Lab 12/29/22  0545 12/30/22  0616   WBC 2.12* 1.65*   HGB 8.1* 7.9*   HCT 26.0* 25.5*   PLT 59* 66*       CMP:   Recent Labs   Lab 12/29/22  0545 12/30/22  0616    142   K 3.8 3.8    108   CO2 32* 33*   * 101   BUN 9 7   CREATININE 0.5 0.4*   CALCIUM  7.5* 7.5*   PROT 6.5 6.3   ALBUMIN 1.7* 1.7*   BILITOT 0.7 0.8   ALKPHOS 116 117   AST 33 35   ALT 16 16   ANIONGAP 2* 1*       Urine Studies:   No results for input(s): COLORU, APPEARANCEUA, PHUR, SPECGRAV, PROTEINUA, GLUCUA, KETONESU, BILIRUBINUA, OCCULTUA, NITRITE, UROBILINOGEN, LEUKOCYTESUR, RBCUA, WBCUA, BACTERIA, SQUAMEPITHEL, HYALINECASTS in the last 48 hours.    Invalid input(s): TREY      Significant Imaging: I have reviewed all pertinent imaging results/findings within the past 24 hours.

## 2022-12-30 NOTE — ASSESSMENT & PLAN NOTE
This patient does have evidence of infective focus  My overall impression is sepsis. Vital signs were reviewed and noted in progress note.  Antibiotics given-   Antibiotics (From admission, onward)    Start     Stop Route Frequency Ordered    12/26/22 1515  meropenem-0.9% sodium chloride 1 g/50 mL IVPB        See Hyperspace for full Linked Orders Report.    -- IV Every 8 hours (non-standard times) 12/26/22 1409    12/26/22 1500  meropenem-0.9% sodium chloride 1 g/50 mL IVPB        See Hyperspace for full Linked Orders Report.    -- IV Every 8 hours (non-standard times) 12/26/22 1409    12/23/22 0533  vancomycin (VANCOCIN) 1,000 mg injection        Note to Pharmacy: Created by cabinet override    12/23 1744   12/23/22 0533    12/23/22 0405  piperacillin-tazobactam (ZOSYN) 4.5 gram injection        Note to Pharmacy: Created by cabinet override    12/23 1614   12/23/22 0405        Cultures were taken-   Microbiology Results (last 7 days)     Procedure Component Value Units Date/Time    Blood culture [[id number]] Collected: 12/28/22 1058    Order Status: Completed Specimen: Blood Updated: 12/29/22 1812     Blood Culture, Routine No Growth to date      No Growth to date    Blood culture [[id number]] Collected: 12/28/22 1058    Order Status: Completed Specimen: Blood Updated: 12/29/22 1812     Blood Culture, Routine No Growth to date      No Growth to date    Blood culture #2 **CANNOT BE ORDERED STAT** [[id number]] Collected: 12/23/22 0257    Order Status: Completed Specimen: Blood Updated: 12/28/22 2022     Blood Culture, Routine No growth after 5 days.    Urine culture [[id number]]  (Abnormal) Collected: 12/23/22 0501    Order Status: Completed Specimen: Urine Updated: 12/26/22 1250     Urine Culture, Routine JACINTA ALBICANS  50,000 - 99,999 cfu/ml  Treatment of asymptomatic candiduria is not recommended (except for   specific populations). Candida isolated in the urine typically   represents colonization. If an indwelling  urinary catheter is present  it should be removed or replaced.      Narrative:      Preferred Collection Type->Urine, Clean Catch  Specimen Source->Urine    Blood culture #1 **CANNOT BE ORDERED STAT** [[id number]]  (Abnormal)  (Susceptibility) Collected: 12/23/22 0258    Order Status: Completed Specimen: Blood Updated: 12/26/22 1037     Blood Culture, Routine Gram stain aer bottle: Gram negative rods      Gram stain merissa bottle: Gram negative rods      Results called to and read back by:Sari Deng RN 12/24/2022 05:57      ESCHERICHIA COLI ESBL        Latest lactate reviewed, they are-  No results for input(s): LACTATE in the last 72 hours.    Organ dysfunction indicated by Acute kidney injury, Encephalopathy  and Acute liver injury  Source- ?    Source control Achieved by- see orders    Merrem day 4 of 7

## 2022-12-30 NOTE — PLAN OF CARE
Problem: Occupational Therapy  Goal: Occupational Therapy Goal  Description: Goals to be met by: 01/02/22     Patient will increase functional independence with ADLs by performing:    UE Dressing with Set-up Assistance.  LE Dressing with Minimal Assistance.  Grooming while seated with Modified [place].  Toileting from toilet with Supervision for hygiene and clothing management.   Bathing from  shower chair/bench with Minimal Assistance.  Step transfer with Minimal Assistance  Toilet transfer to toilet with Minimal Assistance.  Increased functional strength to 4/5 for bilateral UE's.    Outcome: Ongoing, Progressing   Continue with POC

## 2022-12-30 NOTE — PLAN OF CARE
Plan of care reiterated at bedside. Continuing pain medications for pain control per orders. Denies any other needs, will continue to monitor.

## 2022-12-30 NOTE — ASSESSMENT & PLAN NOTE
Labs noted    AST   Date Value Ref Range Status   12/30/2022 35 10 - 40 U/L Final   12/29/2022 33 10 - 40 U/L Final   12/28/2022 29 10 - 40 U/L Final   12/27/2022 29 10 - 40 U/L Final   12/25/2022 31 10 - 40 U/L Final     ALT   Date Value Ref Range Status   12/30/2022 16 10 - 44 U/L Final   12/29/2022 16 10 - 44 U/L Final   12/28/2022 16 10 - 44 U/L Final   12/27/2022 14 10 - 44 U/L Final   12/25/2022 15 10 - 44 U/L Final        Alkaline Phosphatase   Date Value Ref Range Status   12/30/2022 117 55 - 135 U/L Final   12/29/2022 116 55 - 135 U/L Final   12/28/2022 109 55 - 135 U/L Final   12/27/2022 115 55 - 135 U/L Final   12/25/2022 111 55 - 135 U/L Final

## 2022-12-30 NOTE — PT/OT/SLP PROGRESS
Occupational Therapy   Treatment    Name: Rachel Zazueta  MRN: [id number]  Admitting Diagnosis:  Severe sepsis       Recommendations:     Discharge Recommendations: other (see comments) (to be determined by progress until discharge)  Discharge Equipment Recommendations:  other (see comments) (to be determined prior to discharge)  Barriers to discharge:  Other (Comment) (medical and fuctional status)    Assessment:     Rachel Zazueta is a 42 y.o. female with a medical diagnosis of Severe sepsis.  She presents with good motivation. Performance deficits affecting function are weakness, impaired endurance, impaired sensation, impaired self care skills, impaired functional mobility, decreased safety awareness, pain.     Rehab Prognosis:  Fair; patient would benefit from acute skilled OT services to address these deficits and reach maximum level of function.       Plan:     Patient to be seen 5 x/week to address the above listed problems via self-care/home management, therapeutic activities, therapeutic exercises, sensory integration  Plan of Care Expires: 01/02/23  Plan of Care Reviewed with: patient    Subjective     Pain/Comfort:  Pain Rating 1: 8/10  Location - Side 1: Bilateral  Location - Orientation 1: lower  Location 1: back  Pain Addressed 1: Reposition, Cessation of Activity, Nurse notified  Pain Rating Post-Intervention 1: 8/10    Objective:     Communicated with: OT and RN prior to session.  Patient found supine with telemetry, PureWick, peripheral IV upon OT entry to room.    General Precautions: Standard, fall    Orthopedic Precautions:N/A  Braces: N/A  Respiratory Status: Room air     Occupational Performance:     Activities of Daily Living:  Grooming: stand by assistance post set-up supine in bed with HOB slightly elevated      AMPAC 6 Click ADL: 16    Treatment & Education:  Patient agreeable to participate despite high pain rating. Pt attempted to roll to L side, however pt was unable due to pain.  Pt reported pain was 10/10 during attempted bed mobility and pt was starting to get upset with herself. COTTON educated pt that it was going to take time to get back to prior level of function and to lie back down. Pt completed grooming task such as brushing hair and teeth supine in bed with slight elevation in HOB with SBA post set-up. Pt then completed therapeutic exercise supine in bed with HOB elevated for 2 x 10 in the following planes: shoulder flexion/extension, shoulder abduction, elbow flexion/extension, and composite fist with rest breaks b/w each set. Pt tolerated session well even with high pain levels.     Patient left supine with all lines intact, call button in reach, and RN present    GOALS:   Multidisciplinary Problems       Occupational Therapy Goals          Problem: Occupational Therapy    Goal Priority Disciplines Outcome Interventions   Occupational Therapy Goal     OT, PT/OT     Description: Goals to be met by: 01/02/22     Patient will increase functional independence with ADLs by performing:    UE Dressing with Set-up Assistance.  LE Dressing with Minimal Assistance.  Grooming while seated with Modified [place].  Toileting from toilet with Supervision for hygiene and clothing management.   Bathing from  shower chair/bench with Minimal Assistance.  Step transfer with Minimal Assistance  Toilet transfer to toilet with Minimal Assistance.  Increased functional strength to 4/5 for bilateral UE's.                         Time Tracking:     OT Date of Treatment: 12/30/22  OT Start Time: 0925  OT Stop Time: 0951  OT Total Time (min): 26 min    Billable Minutes:Self Care/Home Management 10  Therapeutic Exercise 16    OT/ROSS: ROSS     ROSS Visit Number: 1    12/30/2022

## 2022-12-31 LAB
ALBUMIN SERPL BCP-MCNC: 1.6 G/DL (ref 3.5–5.2)
ALP SERPL-CCNC: 126 U/L (ref 55–135)
ALT SERPL W/O P-5'-P-CCNC: 16 U/L (ref 10–44)
ANION GAP SERPL CALC-SCNC: 2 MMOL/L (ref 8–16)
AST SERPL-CCNC: 34 U/L (ref 10–40)
BASOPHILS # BLD AUTO: ABNORMAL K/UL (ref 0–0.2)
BASOPHILS NFR BLD: 0 % (ref 0–1.9)
BILIRUB SERPL-MCNC: 0.9 MG/DL (ref 0.1–1)
BUN SERPL-MCNC: 7 MG/DL (ref 6–20)
CALCIUM SERPL-MCNC: 7.7 MG/DL (ref 8.7–10.5)
CHLORIDE SERPL-SCNC: 106 MMOL/L (ref 95–110)
CO2 SERPL-SCNC: 33 MMOL/L (ref 23–29)
CREAT SERPL-MCNC: 0.5 MG/DL (ref 0.5–1.4)
DIFFERENTIAL METHOD: ABNORMAL
EOSINOPHIL # BLD AUTO: ABNORMAL K/UL (ref 0–0.5)
EOSINOPHIL NFR BLD: 5 % (ref 0–8)
ERYTHROCYTE [DISTWIDTH] IN BLOOD BY AUTOMATED COUNT: 17.2 % (ref 11.5–14.5)
EST. GFR  (NO RACE VARIABLE): >60 ML/MIN/1.73 M^2
GLUCOSE SERPL-MCNC: 102 MG/DL (ref 70–110)
HCT VFR BLD AUTO: 25.5 % (ref 37–48.5)
HGB BLD-MCNC: 8.1 G/DL (ref 12–16)
IMM GRANULOCYTES # BLD AUTO: ABNORMAL K/UL (ref 0–0.04)
IMM GRANULOCYTES NFR BLD AUTO: ABNORMAL % (ref 0–0.5)
LYMPHOCYTES # BLD AUTO: ABNORMAL K/UL (ref 1–4.8)
LYMPHOCYTES NFR BLD: 21 % (ref 18–48)
MAGNESIUM SERPL-MCNC: 1.8 MG/DL (ref 1.6–2.6)
MCH RBC QN AUTO: 28.1 PG (ref 27–31)
MCHC RBC AUTO-ENTMCNC: 31.8 G/DL (ref 32–36)
MCV RBC AUTO: 89 FL (ref 82–98)
MONOCYTES # BLD AUTO: ABNORMAL K/UL (ref 0.3–1)
MONOCYTES NFR BLD: 8 % (ref 4–15)
NEUTROPHILS NFR BLD: 62 % (ref 38–73)
NEUTS BAND NFR BLD MANUAL: 4 %
NRBC BLD-RTO: 0 /100 WBC
PLATELET # BLD AUTO: 58 K/UL (ref 150–450)
PMV BLD AUTO: 8.8 FL (ref 9.2–12.9)
POTASSIUM SERPL-SCNC: 3.9 MMOL/L (ref 3.5–5.1)
PROT SERPL-MCNC: 6.4 G/DL (ref 6–8.4)
RBC # BLD AUTO: 2.88 M/UL (ref 4–5.4)
SODIUM SERPL-SCNC: 141 MMOL/L (ref 136–145)
WBC # BLD AUTO: 1.46 K/UL (ref 3.9–12.7)

## 2022-12-31 PROCEDURE — 63600175 PHARM REV CODE 636 W HCPCS: Performed by: INTERNAL MEDICINE

## 2022-12-31 PROCEDURE — 85027 COMPLETE CBC AUTOMATED: CPT | Performed by: INTERNAL MEDICINE

## 2022-12-31 PROCEDURE — A4216 STERILE WATER/SALINE, 10 ML: HCPCS | Performed by: INTERNAL MEDICINE

## 2022-12-31 PROCEDURE — 36415 COLL VENOUS BLD VENIPUNCTURE: CPT | Performed by: INTERNAL MEDICINE

## 2022-12-31 PROCEDURE — 25000003 PHARM REV CODE 250: Performed by: INTERNAL MEDICINE

## 2022-12-31 PROCEDURE — 85007 BL SMEAR W/DIFF WBC COUNT: CPT | Performed by: INTERNAL MEDICINE

## 2022-12-31 PROCEDURE — 80053 COMPREHEN METABOLIC PANEL: CPT | Performed by: INTERNAL MEDICINE

## 2022-12-31 PROCEDURE — 11000001 HC ACUTE MED/SURG PRIVATE ROOM

## 2022-12-31 PROCEDURE — 27000207 HC ISOLATION

## 2022-12-31 PROCEDURE — 83735 ASSAY OF MAGNESIUM: CPT | Performed by: INTERNAL MEDICINE

## 2022-12-31 PROCEDURE — 63700000 PHARM REV CODE 250 ALT 637 W/O HCPCS: Performed by: INTERNAL MEDICINE

## 2022-12-31 RX ADMIN — METHOCARBAMOL TABLETS 500 MG: 500 TABLET, COATED ORAL at 01:12

## 2022-12-31 RX ADMIN — MEROPENEM AND SODIUM CHLORIDE 1 G: 1 INJECTION, SOLUTION INTRAVENOUS at 04:12

## 2022-12-31 RX ADMIN — PREGABALIN 75 MG: 75 CAPSULE ORAL at 09:12

## 2022-12-31 RX ADMIN — MEROPENEM AND SODIUM CHLORIDE 1 G: 1 INJECTION, SOLUTION INTRAVENOUS at 11:12

## 2022-12-31 RX ADMIN — MEROPENEM AND SODIUM CHLORIDE 1 G: 1 INJECTION, SOLUTION INTRAVENOUS at 06:12

## 2022-12-31 RX ADMIN — METHOCARBAMOL TABLETS 500 MG: 500 TABLET, COATED ORAL at 04:12

## 2022-12-31 RX ADMIN — MORPHINE SULFATE 2 MG: 2 INJECTION, SOLUTION INTRAMUSCULAR; INTRAVENOUS at 02:12

## 2022-12-31 RX ADMIN — Medication 10 ML: at 05:12

## 2022-12-31 RX ADMIN — Medication 10 ML: at 06:12

## 2022-12-31 RX ADMIN — ALPRAZOLAM 0.5 MG: 0.5 TABLET ORAL at 09:12

## 2022-12-31 RX ADMIN — MORPHINE SULFATE 2 MG: 2 INJECTION, SOLUTION INTRAMUSCULAR; INTRAVENOUS at 06:12

## 2022-12-31 RX ADMIN — METHOCARBAMOL TABLETS 500 MG: 500 TABLET, COATED ORAL at 09:12

## 2022-12-31 RX ADMIN — MEROPENEM AND SODIUM CHLORIDE 1 G: 1 INJECTION, SOLUTION INTRAVENOUS at 07:12

## 2022-12-31 RX ADMIN — POLYETHYLENE GLYCOL (3350) 17 G: 17 POWDER, FOR SOLUTION ORAL at 09:12

## 2022-12-31 RX ADMIN — Medication 10 ML: at 11:12

## 2022-12-31 RX ADMIN — MORPHINE SULFATE 2 MG: 2 INJECTION, SOLUTION INTRAMUSCULAR; INTRAVENOUS at 09:12

## 2022-12-31 RX ADMIN — FLUCONAZOLE 200 MG: 100 TABLET ORAL at 09:12

## 2022-12-31 RX ADMIN — PANTOPRAZOLE SODIUM 40 MG: 40 TABLET, DELAYED RELEASE ORAL at 09:12

## 2022-12-31 RX ADMIN — MEROPENEM AND SODIUM CHLORIDE 1 G: 1 INJECTION, SOLUTION INTRAVENOUS at 03:12

## 2022-12-31 RX ADMIN — HYDROCODONE BITARTRATE AND ACETAMINOPHEN 1 TABLET: 5; 325 TABLET ORAL at 04:12

## 2022-12-31 NOTE — PLAN OF CARE
Problem: Adult Inpatient Plan of Care  Goal: Optimal Comfort and Wellbeing  12/31/2022 0617 by Kylah Jerry RN  Outcome: Ongoing, Not Progressing  12/31/2022 0615 by Kylah Jerry RN  Outcome: Ongoing, Progressing  Goal: Readiness for Transition of Care  12/31/2022 0617 by Kylah Jerry RN  Outcome: Ongoing, Not Progressing  12/31/2022 0615 by Kylah Jerry RN  Outcome: Ongoing, Progressing         Problem: Infection  Goal: Absence of Infection Signs and Symptoms  12/31/2022 0617 by Kylah Jerry RN  Outcome: Ongoing, Progressing  12/31/2022 0615 by Kylah Jerry RN  Outcome: Ongoing, Progressing     Problem: Adult Inpatient Plan of Care  Goal: Plan of Care Review  12/31/2022 0617 by Kylah Jerry RN  Outcome: Ongoing, Progressing  12/31/2022 0615 by Kylah Jerry RN  Outcome: Ongoing, Progressing  Goal: Patient-Specific Goal (Individualized)  12/31/2022 0617 by Kylah Jerry RN  Outcome: Ongoing, Progressing  12/31/2022 0615 by Kylah Jerry RN  Outcome: Ongoing, Progressing  Goal: Absence of Hospital-Acquired Illness or Injury  12/31/2022 0617 by Kylah Jerry RN  Outcome: Ongoing, Progressing  12/31/2022 0615 by Kylah Jerry RN  Outcome: Ongoing, Progressing     Problem: Adjustment to Illness (Sepsis/Septic Shock)  Goal: Optimal Coping  12/31/2022 0617 by Kylah Jerry RN  Outcome: Ongoing, Progressing  12/31/2022 0615 by Kylah Jerry RN  Outcome: Ongoing, Progressing     Problem: Bleeding (Sepsis/Septic Shock)  Goal: Absence of Bleeding  12/31/2022 0617 by Kylah Jerry RN  Outcome: Ongoing, Progressing  12/31/2022 0615 by Kylah Jerry RN  Outcome: Ongoing, Progressing     Problem: Glycemic Control Impaired (Sepsis/Septic Shock)  Goal: Blood Glucose Level Within Desired Range  12/31/2022 0617 by Kylah Jerry RN  Outcome: Ongoing, Progressing  12/31/2022 0615 by Kylah Jerry RN  Outcome: Ongoing, Progressing     Problem: Infection Progression (Sepsis/Septic Shock)  Goal: Absence of Infection Signs and Symptoms  12/31/2022 0617 by Kylah Jerry RN  Outcome:  Ongoing, Progressing  12/31/2022 0615 by Kylah Jerry RN  Outcome: Ongoing, Progressing     Problem: Nutrition Impaired (Sepsis/Septic Shock)  Goal: Optimal Nutrition Intake  12/31/2022 0617 by Kylah Jerry RN  Outcome: Ongoing, Progressing  12/31/2022 0615 by Kylah Jerry RN  Outcome: Ongoing, Progressing     Problem: Skin Injury Risk Increased  Goal: Skin Health and Integrity  12/31/2022 0617 by Kylah Jerry RN  Outcome: Ongoing, Progressing  12/31/2022 0615 by Kylah Jerry RN  Outcome: Ongoing, Progressing     Problem: Fall Injury Risk  Goal: Absence of Fall and Fall-Related Injury  12/31/2022 0617 by Kylah Jerry RN  Outcome: Ongoing, Progressing  12/31/2022 0615 by Kylah Jerry RN  Outcome: Ongoing, Progressing

## 2022-12-31 NOTE — PLAN OF CARE
Plan of care reviewed with the patient. Patient with moderate pain and anxiety during shift and relieved with prn medication.

## 2023-01-01 LAB
ALBUMIN SERPL BCP-MCNC: 1.7 G/DL (ref 3.5–5.2)
ALP SERPL-CCNC: 125 U/L (ref 55–135)
ALT SERPL W/O P-5'-P-CCNC: 16 U/L (ref 10–44)
ANION GAP SERPL CALC-SCNC: 3 MMOL/L (ref 8–16)
AST SERPL-CCNC: 34 U/L (ref 10–40)
BASOPHILS # BLD AUTO: 0.01 K/UL (ref 0–0.2)
BASOPHILS NFR BLD: 0.5 % (ref 0–1.9)
BILIRUB SERPL-MCNC: 0.9 MG/DL (ref 0.1–1)
BUN SERPL-MCNC: 8 MG/DL (ref 6–20)
CALCIUM SERPL-MCNC: 7.8 MG/DL (ref 8.7–10.5)
CHLORIDE SERPL-SCNC: 106 MMOL/L (ref 95–110)
CO2 SERPL-SCNC: 31 MMOL/L (ref 23–29)
CREAT SERPL-MCNC: 0.4 MG/DL (ref 0.5–1.4)
DIFFERENTIAL METHOD: ABNORMAL
EOSINOPHIL # BLD AUTO: 0.1 K/UL (ref 0–0.5)
EOSINOPHIL NFR BLD: 3.5 % (ref 0–8)
ERYTHROCYTE [DISTWIDTH] IN BLOOD BY AUTOMATED COUNT: 17.4 % (ref 11.5–14.5)
EST. GFR  (NO RACE VARIABLE): >60 ML/MIN/1.73 M^2
GLUCOSE SERPL-MCNC: 94 MG/DL (ref 70–110)
HCT VFR BLD AUTO: 26.1 % (ref 37–48.5)
HGB BLD-MCNC: 8.5 G/DL (ref 12–16)
IMM GRANULOCYTES # BLD AUTO: 0.01 K/UL (ref 0–0.04)
IMM GRANULOCYTES NFR BLD AUTO: 0.5 % (ref 0–0.5)
LYMPHOCYTES # BLD AUTO: 0.5 K/UL (ref 1–4.8)
LYMPHOCYTES NFR BLD: 23 % (ref 18–48)
MAGNESIUM SERPL-MCNC: 1.9 MG/DL (ref 1.6–2.6)
MCH RBC QN AUTO: 28.5 PG (ref 27–31)
MCHC RBC AUTO-ENTMCNC: 32.6 G/DL (ref 32–36)
MCV RBC AUTO: 88 FL (ref 82–98)
MONOCYTES # BLD AUTO: 0.2 K/UL (ref 0.3–1)
MONOCYTES NFR BLD: 10 % (ref 4–15)
NEUTROPHILS # BLD AUTO: 1.3 K/UL (ref 1.8–7.7)
NEUTROPHILS NFR BLD: 62.5 % (ref 38–73)
NRBC BLD-RTO: 0 /100 WBC
PLATELET # BLD AUTO: 66 K/UL (ref 150–450)
PMV BLD AUTO: 9.2 FL (ref 9.2–12.9)
POTASSIUM SERPL-SCNC: 3.9 MMOL/L (ref 3.5–5.1)
PROT SERPL-MCNC: 6.5 G/DL (ref 6–8.4)
RBC # BLD AUTO: 2.98 M/UL (ref 4–5.4)
SODIUM SERPL-SCNC: 140 MMOL/L (ref 136–145)
WBC # BLD AUTO: 2 K/UL (ref 3.9–12.7)

## 2023-01-01 PROCEDURE — 25000003 PHARM REV CODE 250: Performed by: INTERNAL MEDICINE

## 2023-01-01 PROCEDURE — 11000001 HC ACUTE MED/SURG PRIVATE ROOM

## 2023-01-01 PROCEDURE — 85025 COMPLETE CBC W/AUTO DIFF WBC: CPT | Performed by: INTERNAL MEDICINE

## 2023-01-01 PROCEDURE — 63600175 PHARM REV CODE 636 W HCPCS: Performed by: INTERNAL MEDICINE

## 2023-01-01 PROCEDURE — A4216 STERILE WATER/SALINE, 10 ML: HCPCS | Performed by: INTERNAL MEDICINE

## 2023-01-01 PROCEDURE — 63700000 PHARM REV CODE 250 ALT 637 W/O HCPCS: Performed by: INTERNAL MEDICINE

## 2023-01-01 PROCEDURE — 27000207 HC ISOLATION

## 2023-01-01 PROCEDURE — 80053 COMPREHEN METABOLIC PANEL: CPT | Performed by: INTERNAL MEDICINE

## 2023-01-01 PROCEDURE — 83735 ASSAY OF MAGNESIUM: CPT | Performed by: INTERNAL MEDICINE

## 2023-01-01 PROCEDURE — 36415 COLL VENOUS BLD VENIPUNCTURE: CPT | Performed by: INTERNAL MEDICINE

## 2023-01-01 RX ORDER — LACTULOSE 10 G/15ML
20 SOLUTION ORAL 3 TIMES DAILY
Status: DISCONTINUED | OUTPATIENT
Start: 2023-01-01 | End: 2023-01-19 | Stop reason: HOSPADM

## 2023-01-01 RX ADMIN — MEROPENEM AND SODIUM CHLORIDE 1 G: 1 INJECTION, SOLUTION INTRAVENOUS at 11:01

## 2023-01-01 RX ADMIN — Medication 10 ML: at 06:01

## 2023-01-01 RX ADMIN — METHOCARBAMOL TABLETS 500 MG: 500 TABLET, COATED ORAL at 09:01

## 2023-01-01 RX ADMIN — Medication 10 ML: at 12:01

## 2023-01-01 RX ADMIN — MORPHINE SULFATE 2 MG: 2 INJECTION, SOLUTION INTRAMUSCULAR; INTRAVENOUS at 05:01

## 2023-01-01 RX ADMIN — HYDROCODONE BITARTRATE AND ACETAMINOPHEN 1 TABLET: 5; 325 TABLET ORAL at 10:01

## 2023-01-01 RX ADMIN — POLYETHYLENE GLYCOL (3350) 17 G: 17 POWDER, FOR SOLUTION ORAL at 09:01

## 2023-01-01 RX ADMIN — PREGABALIN 75 MG: 75 CAPSULE ORAL at 09:01

## 2023-01-01 RX ADMIN — Medication 10 ML: at 01:01

## 2023-01-01 RX ADMIN — METHOCARBAMOL TABLETS 500 MG: 500 TABLET, COATED ORAL at 01:01

## 2023-01-01 RX ADMIN — MEROPENEM AND SODIUM CHLORIDE 1 G: 1 INJECTION, SOLUTION INTRAVENOUS at 03:01

## 2023-01-01 RX ADMIN — MEROPENEM AND SODIUM CHLORIDE 1 G: 1 INJECTION, SOLUTION INTRAVENOUS at 07:01

## 2023-01-01 RX ADMIN — PANTOPRAZOLE SODIUM 40 MG: 40 TABLET, DELAYED RELEASE ORAL at 09:01

## 2023-01-01 RX ADMIN — METHOCARBAMOL TABLETS 500 MG: 500 TABLET, COATED ORAL at 05:01

## 2023-01-01 RX ADMIN — LACTULOSE 20 G: 20 SOLUTION ORAL at 10:01

## 2023-01-01 RX ADMIN — Medication 10 ML: at 11:01

## 2023-01-01 RX ADMIN — LACTULOSE 20 G: 20 SOLUTION ORAL at 01:01

## 2023-01-01 RX ADMIN — Medication 10 ML: at 05:01

## 2023-01-01 RX ADMIN — FLUCONAZOLE 200 MG: 100 TABLET ORAL at 09:01

## 2023-01-01 RX ADMIN — METHOCARBAMOL TABLETS 500 MG: 500 TABLET, COATED ORAL at 10:01

## 2023-01-01 RX ADMIN — HYDROCODONE BITARTRATE AND ACETAMINOPHEN 1 TABLET: 5; 325 TABLET ORAL at 04:01

## 2023-01-01 RX ADMIN — PREGABALIN 75 MG: 75 CAPSULE ORAL at 10:01

## 2023-01-01 NOTE — SUBJECTIVE & OBJECTIVE
Past Medical History:   Diagnosis Date    Anemia     Anxiety     Depressed     Diskitis     Hep C w/o coma, chronic     IV drug abuse     Kidney stone     Liver cirrhosis        Past Surgical History:   Procedure Laterality Date    ARTHROTOMY OF SHOULDER Left 11/15/2022    Procedure: ARTHROTOMY, SHOULDER;  Surgeon: Bjorn Hayes MD;  Location: [facility];  Service: Orthopedics;  Laterality: Left;  Left acromioclavicular joint arthrotomy    BACK SURGERY      benine tumor removal      forehead, age 9    CHOLECYSTECTOMY      KIDNEY SURGERY      LUMBAR FUSION Bilateral 3/2/2022    Procedure: FUSION, SPINE, LUMBAR;  Surgeon: Guille Templeton MD;  Location: [address];  Service: Neurosurgery;  Laterality: Bilateral;  AIRO  T10--Pelvis    REMOVAL OF HARDWARE FROM SPINE N/A 2/21/2022    Procedure: REMOVAL, HARDWARE, SPINE;  Surgeon: Guille Templeton MD;  Location: [address];  Service: Neurosurgery;  Laterality: N/A;  [place]    SPINE SURGERY         Review of patient's allergies indicates:   Allergen Reactions    Bee venom protein (honey bee) Anaphylaxis     Patient reports she is allergic to bee stings.    Naproxen Anaphylaxis     Throat closing    12-23- Patient reports taking Ibuprofen 200 mg at home without problems. Verified X3. [place]    Wasp sting [allergen ext-venom-honey bee] Anaphylaxis    Adhesive Blisters    Iodine and iodide containing products Hives     Allergic to iodine in seafood only    Shellfish containing products     Nuts [tree nut] Rash       No current facility-administered medications on file prior to encounter.     Current Outpatient Medications on File Prior to Encounter   Medication Sig    acetaminophen (TYLENOL) 325 MG tablet Take 2 tablets (650 mg total) by mouth every 6 (six) hours as needed for Temperature greater than (100.4 F).    albuterol (ACCUNEB) 1.25 mg/3 mL Nebu Take 3 mLs (1.25 mg total) by nebulization every 6 (six) hours as needed (shortness of breath). Rescue    buPROPion  (WELLBUTRIN) 100 MG tablet Take 1 tablet (100 mg total) by mouth 2 (two) times daily.    ciprofloxacin HCl (CIPRO) 500 MG tablet Take 500 mg by mouth 2 (two) times daily.    folic acid (FOLVITE) 1 MG tablet Take 1 tablet (1 mg total) by mouth once daily. (Patient not taking: Reported on 11/25/2022)    pantoprazole (PROTONIX) 40 MG tablet Take 1 tablet (40 mg total) by mouth once daily.    pregabalin (LYRICA) 75 MG capsule Take 1 capsule (75 mg total) by mouth 3 (three) times daily.    sulfamethoxazole-trimethoprim 800-160mg (BACTRIM DS) 800-160 mg Tab Take 2 tablets by mouth 2 (two) times daily.    [DISCONTINUED] diclofenac sodium (VOLTAREN) 1 % Gel Apply 2 g topically 4 (four) times daily.     Family History       Problem Relation (Age of Onset)    Cirrhosis Father    Drug abuse Mother, Father    Hepatitis Mother, Father    Liver cancer Mother          Tobacco Use    Smoking status: Some Days     Packs/day: 0.50     Years: 23.00     Pack years: 11.50     Types: Cigarettes    Smokeless tobacco: Never    Tobacco comments:     patient states she knows she nees to quit.   Substance and Sexual Activity    Alcohol use: Not Currently     Comment: quit 2014    Drug use: Yes     Frequency: 4.0 times per week     Types: Marijuana     Comment: Patient denies needle use, only inhalation of methampehtamines or orally.  Last known drug use was prior to admission to hospital stay for surgery    Sexual activity: Yes     Partners: Male     Birth control/protection: None     Review of Systems   Unable to perform ROS: Acuity of condition   Objective:     Vital Signs (Most Recent):  Temp: 98.5 °F (36.9 °C) (12/31/22 1908)  Pulse: 93 (12/31/22 1908)  Resp: 19 (12/31/22 1908)  BP: (!) 132/59 (12/31/22 1908)  SpO2: 96 % (12/31/22 1908)   Vital Signs (24h Range):  Temp:  [98.2 °F (36.8 °C)-98.6 °F (37 °C)] 98.5 °F (36.9 °C)  Pulse:  [88-98] 93  Resp:  [16-20] 19  SpO2:  [95 %-99 %] 96 %  BP: (112-132)/(59-70) 132/59     Weight: 97.5 kg  (215 lb)  Body mass index is 33.67 kg/m².    Physical Exam  Vitals and nursing note reviewed.   Constitutional:       General: She is not in acute distress.     Appearance: She is obese. She is ill-appearing.   HENT:      Head: Normocephalic and atraumatic.      Nose: Nose normal. No congestion or rhinorrhea.      Mouth/Throat:      Mouth: Mucous membranes are dry.      Pharynx: No oropharyngeal exudate.   Eyes:      General: No scleral icterus.  Neck:      Vascular: No carotid bruit.   Cardiovascular:      Rate and Rhythm: Regular rhythm. Tachycardia present.      Heart sounds: Murmur heard.     No friction rub. No gallop.   Pulmonary:      Effort: No respiratory distress.      Breath sounds: No stridor. No wheezing, rhonchi or rales.   Chest:      Chest wall: No tenderness.   Abdominal:      General: There is no distension.      Palpations: There is no mass.      Tenderness: There is no abdominal tenderness. There is no right CVA tenderness, left CVA tenderness, guarding or rebound.      Hernia: No hernia is present.   Musculoskeletal:         General: No swelling, tenderness or deformity.      Cervical back: Neck supple. No rigidity or tenderness.      Right lower leg: Edema present.      Left lower leg: Edema present.   Lymphadenopathy:      Cervical: No cervical adenopathy.   Skin:     Capillary Refill: Capillary refill takes less than 2 seconds.      Coloration: Skin is not jaundiced or pale.   Neurological:      Cranial Nerves: No cranial nerve deficit.      Sensory: Sensory deficit present.      Motor: Weakness present.   Psychiatric:      Comments: Calm, cooperative, moving all extremities.           Significant Labs: CBC:   Recent Labs   Lab 12/30/22  0616 12/31/22  0600   WBC 1.65* 1.46*   HGB 7.9* 8.1*   HCT 25.5* 25.5*   PLT 66* 58*       CMP:   Recent Labs   Lab 12/30/22  0616 12/31/22  0600    141   K 3.8 3.9    106   CO2 33* 33*    102   BUN 7 7   CREATININE 0.4* 0.5   CALCIUM 7.5*  7.7*   PROT 6.3 6.4   ALBUMIN 1.7* 1.6*   BILITOT 0.8 0.9   ALKPHOS 117 126   AST 35 34   ALT 16 16   ANIONGAP 1* 2*       Urine Studies:   No results for input(s): COLORU, APPEARANCEUA, PHUR, SPECGRAV, PROTEINUA, GLUCUA, KETONESU, BILIRUBINUA, OCCULTUA, NITRITE, UROBILINOGEN, LEUKOCYTESUR, RBCUA, WBCUA, BACTERIA, SQUAMEPITHEL, HYALINECASTS in the last 48 hours.    Invalid input(s): TREY      Significant Imaging: I have reviewed all pertinent imaging results/findings within the past 24 hours.

## 2023-01-01 NOTE — PLAN OF CARE
Plan of care reviewed with the patient. Patient is left and right sided rib pain and given prn meds with moderate-full relief.

## 2023-01-01 NOTE — PLAN OF CARE
Problem: Adult Inpatient Plan of Care  Goal: Optimal Comfort and Wellbeing  Outcome: Ongoing, Not Progressing  Goal: Readiness for Transition of Care  Outcome: Ongoing, Not Progressing     Problem: Infection Progression (Sepsis/Septic Shock)  Goal: Absence of Infection Signs and Symptoms  Outcome: Ongoing, Not Progressing     Problem: Fall Injury Risk  Goal: Absence of Fall and Fall-Related Injury  Outcome: Ongoing, Not Progressing         Problem: Infection  Goal: Absence of Infection Signs and Symptoms  Outcome: Ongoing, Progressing     Problem: Adult Inpatient Plan of Care  Goal: Plan of Care Review  Outcome: Ongoing, Progressing  Goal: Patient-Specific Goal (Individualized)  Outcome: Ongoing, Progressing  Goal: Absence of Hospital-Acquired Illness or Injury  Outcome: Ongoing, Progressing     Problem: Adjustment to Illness (Sepsis/Septic Shock)  Goal: Optimal Coping  Outcome: Ongoing, Progressing     Problem: Bleeding (Sepsis/Septic Shock)  Goal: Absence of Bleeding  Outcome: Ongoing, Progressing     Problem: Glycemic Control Impaired (Sepsis/Septic Shock)  Goal: Blood Glucose Level Within Desired Range  Outcome: Ongoing, Progressing     Problem: Nutrition Impaired (Sepsis/Septic Shock)  Goal: Optimal Nutrition Intake  Outcome: Ongoing, Progressing     Problem: Skin Injury Risk Increased  Goal: Skin Health and Integrity  Outcome: Ongoing, Progressing

## 2023-01-01 NOTE — SUBJECTIVE & OBJECTIVE
Past Medical History:   Diagnosis Date    Anemia     Anxiety     Depressed     Diskitis     Hep C w/o coma, chronic     IV drug abuse     Kidney stone     Liver cirrhosis        Past Surgical History:   Procedure Laterality Date    ARTHROTOMY OF SHOULDER Left 11/15/2022    Procedure: ARTHROTOMY, SHOULDER;  Surgeon: Bjorn Hayes MD;  Location: [facility];  Service: Orthopedics;  Laterality: Left;  Left acromioclavicular joint arthrotomy    BACK SURGERY      benine tumor removal      forehead, age 9    CHOLECYSTECTOMY      KIDNEY SURGERY      LUMBAR FUSION Bilateral 3/2/2022    Procedure: FUSION, SPINE, LUMBAR;  Surgeon: Guille Templeton MD;  Location: [address];  Service: Neurosurgery;  Laterality: Bilateral;  [place]  T10--Pelvis    REMOVAL OF HARDWARE FROM SPINE N/A 2/21/2022    Procedure: REMOVAL, HARDWARE, SPINE;  Surgeon: Guille Templeton MD;  Location: [address];  Service: Neurosurgery;  Laterality: N/A;  [place]    SPINE SURGERY         Review of patient's allergies indicates:   Allergen Reactions    Bee venom protein (honey bee) Anaphylaxis     Patient reports she is allergic to bee stings.    Naproxen Anaphylaxis     Throat closing    12-23- Patient reports taking Ibuprofen 200 mg at home without problems. Verified X3. [place]    Wasp sting [allergen ext-venom-honey bee] Anaphylaxis    Adhesive Blisters    Iodine and iodide containing products Hives     Allergic to iodine in seafood only    Shellfish containing products     Nuts [tree nut] Rash       No current facility-administered medications on file prior to encounter.     Current Outpatient Medications on File Prior to Encounter   Medication Sig    acetaminophen (TYLENOL) 325 MG tablet Take 2 tablets (650 mg total) by mouth every 6 (six) hours as needed for Temperature greater than (100.4 F).    albuterol (ACCUNEB) 1.25 mg/3 mL Nebu Take 3 mLs (1.25 mg total) by nebulization every 6 (six) hours as needed (shortness of breath). Rescue    buPROPion  (WELLBUTRIN) 100 MG tablet Take 1 tablet (100 mg total) by mouth 2 (two) times daily.    ciprofloxacin HCl (CIPRO) 500 MG tablet Take 500 mg by mouth 2 (two) times daily.    folic acid (FOLVITE) 1 MG tablet Take 1 tablet (1 mg total) by mouth once daily. (Patient not taking: Reported on 11/25/2022)    pantoprazole (PROTONIX) 40 MG tablet Take 1 tablet (40 mg total) by mouth once daily.    pregabalin (LYRICA) 75 MG capsule Take 1 capsule (75 mg total) by mouth 3 (three) times daily.    sulfamethoxazole-trimethoprim 800-160mg (BACTRIM DS) 800-160 mg Tab Take 2 tablets by mouth 2 (two) times daily.    [DISCONTINUED] diclofenac sodium (VOLTAREN) 1 % Gel Apply 2 g topically 4 (four) times daily.     Family History       Problem Relation (Age of Onset)    Cirrhosis Father    Drug abuse Mother, Father    Hepatitis Mother, Father    Liver cancer Mother          Tobacco Use    Smoking status: Some Days     Packs/day: 0.50     Years: 23.00     Pack years: 11.50     Types: Cigarettes    Smokeless tobacco: Never    Tobacco comments:     patient states she knows she nees to quit.   Substance and Sexual Activity    Alcohol use: Not Currently     Comment: quit 2014    Drug use: Yes     Frequency: 4.0 times per week     Types: Marijuana     Comment: Patient denies needle use, only inhalation of methampehtamines or orally.  Last known drug use was prior to admission to hospital stay for surgery    Sexual activity: Yes     Partners: Male     Birth control/protection: None     Review of Systems   Unable to perform ROS: Acuity of condition   Objective:     Vital Signs (Most Recent):  Temp: 98.1 °F (36.7 °C) (01/01/23 1115)  Pulse: 87 (01/01/23 1115)  Resp: 18 (01/01/23 1115)  BP: 121/60 (01/01/23 1115)  SpO2: 98 % (01/01/23 1115)   Vital Signs (24h Range):  Temp:  [98 °F (36.7 °C)-98.6 °F (37 °C)] 98.1 °F (36.7 °C)  Pulse:  [87-94] 87  Resp:  [18-20] 18  SpO2:  [95 %-98 %] 98 %  BP: (105-132)/(56-64) 121/60     Weight: 97.5 kg (215  lb)  Body mass index is 33.67 kg/m².    Physical Exam  Vitals and nursing note reviewed.   Constitutional:       General: She is not in acute distress.     Appearance: She is obese. She is ill-appearing.   HENT:      Head: Normocephalic and atraumatic.      Nose: Nose normal. No congestion or rhinorrhea.      Mouth/Throat:      Mouth: Mucous membranes are dry.      Pharynx: No oropharyngeal exudate.   Eyes:      General: No scleral icterus.  Neck:      Vascular: No carotid bruit.   Cardiovascular:      Rate and Rhythm: Regular rhythm. Tachycardia present.      Heart sounds: Murmur heard.     No friction rub. No gallop.   Pulmonary:      Effort: No respiratory distress.      Breath sounds: No stridor. No wheezing, rhonchi or rales.   Chest:      Chest wall: No tenderness.   Abdominal:      General: There is no distension.      Palpations: There is no mass.      Tenderness: There is no abdominal tenderness. There is no right CVA tenderness, left CVA tenderness, guarding or rebound.      Hernia: No hernia is present.   Musculoskeletal:         General: No swelling, tenderness or deformity.      Cervical back: Neck supple. No rigidity or tenderness.      Right lower leg: Edema present.      Left lower leg: Edema present.   Lymphadenopathy:      Cervical: No cervical adenopathy.   Skin:     Capillary Refill: Capillary refill takes less than 2 seconds.      Coloration: Skin is not jaundiced or pale.   Neurological:      Cranial Nerves: No cranial nerve deficit.      Sensory: Sensory deficit present.      Motor: Weakness present.   Psychiatric:      Comments: Calm, cooperative, moving all extremities.           Significant Labs: CBC:   Recent Labs   Lab 12/31/22  0600 01/01/23  0538   WBC 1.46* 2.00*   HGB 8.1* 8.5*   HCT 25.5* 26.1*   PLT 58* 66*       CMP:   Recent Labs   Lab 12/31/22  0600 01/01/23  0538    140   K 3.9 3.9    106   CO2 33* 31*    94   BUN 7 8   CREATININE 0.5 0.4*   CALCIUM 7.7* 7.8*    PROT 6.4 6.5   ALBUMIN 1.6* 1.7*   BILITOT 0.9 0.9   ALKPHOS 126 125   AST 34 34   ALT 16 16   ANIONGAP 2* 3*       Urine Studies:   No results for input(s): COLORU, APPEARANCEUA, PHUR, SPECGRAV, PROTEINUA, GLUCUA, KETONESU, BILIRUBINUA, OCCULTUA, NITRITE, UROBILINOGEN, LEUKOCYTESUR, RBCUA, WBCUA, BACTERIA, SQUAMEPITHEL, HYALINECASTS in the last 48 hours.    Invalid input(s): TREY      Significant Imaging: I have reviewed all pertinent imaging results/findings within the past 24 hours.

## 2023-01-01 NOTE — PROGRESS NOTES
"[facility] Medicine  Progress Note    Patient Name: Rachel Zazueta  MRN: [id number]  Patient Class: IP- Inpatient   Admission Date: 12/23/2022  Length of Stay: 8 days  Attending Physician: Hal Barger Jr., MD  Primary Care Provider: Dannielle Merino DO        Subjective:     Principal Problem:Severe sepsis        HPI:  ER HPI:  42-year-old female with a history of anemia, anxiety, recurrent cellulitis, hepatitis C, IVDU, liver cirrhosis, cholecystectomy, kidney stones, lumbar fusion, shoulder surgery, chronic back pain comes in c/o generalized weakness, fatigue, and bilateral flank pain.  She reports that this has been going on intermittently for several days and was diagnosed with a UTI recently.  No fever.  No abdominal pain or vomiting or diarrhea.     IM HPI:  Patient is well known to our service.  Patient has a history of IV drug abuse and unfortunately has relapsed.  Patient has a history of a epidural abscess requiring a large lumbar surgery with multiple rehabilitative stays and therapy.  The patient was progressing to ambulation and recently has declined.  Patient admits to starting drugs including IV drug use again.  Patient presented to the ED with the above symptoms and she has a noticeable cardiac murmur today that we are getting a stat echocardiogram.  Patient's been started on antibiotics fluid resuscitated and seems to have sepsis.  Patient's urinary studies were abnormal but not overwhelming.      Overview/Hospital Course:  12/24/22 CG: Patient remains in the ICU today. Has a lot of muscle spasms and low back pain. Echocardiogram ordered yesterday and completed; significant for pulm HTN but without vegetations.   12/25/22 CG:  Overnight she was having a lot of significant discomfort and pain.  We placed her on Precedex and this has helped significantly with her body aches, her irritability and muscle spasms."  12/26/22 FM:  Patient required Precedex for agitation and " mood changes.  Patient is calm and cooperative this morning.  Patient appears to have E coli sepsis so will taper antibiotics.  Patient's echocardiogram showed no vegetation and E coli would be low risk for metastatic infection.  Will transfer the patient to the floor once her Precedex discontinue we counseled her again today on the importance of avoiding substances and illicit drugs.  This continues to be setback for her.  12/27/22 WC:  Patient overall remains stable and is slowly improving.  Urine culture has revealed Candida albicans in addition to blood culture revealing E coli.  12/28/22 WC:  Patient resting comfortably this morning.  Pharm ID on case for treatment duration recs.    12/29/22 WC:  no acute events overnight.  Back pain at this time.   12/20/22:  no acute events continue IV ABX for esbl  12/31/22 CG: stable, continuing antibiotics.       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9    CHOLECYSTECTOMY      KIDNEY SURGERY      LUMBAR FUSION Bilateral 3/2/2022    Procedure: FUSION, SPINE, LUMBAR;  Surgeon: Guille Templeton MD;  Location: [address];  Service: Neurosurgery;  Laterality: Bilateral;  AIRO  T10--Pelvis    REMOVAL OF HARDWARE FROM SPINE N/A 2/21/2022    Procedure: REMOVAL, HARDWARE, SPINE;  Surgeon: Guille Templeton MD;  Location: [address];  Service: Neurosurgery;  Laterality: N/A;  [place]    SPINE SURGERY         Review of patient's allergies indicates:   Allergen Reactions    Bee venom protein (honey bee)  Anaphylaxis     Patient reports she is allergic to bee stings.    Naproxen Anaphylaxis     Throat closing    12-23- Patient reports taking Ibuprofen 200 mg at home without problems. Verified X3. [place]    Wasp sting [allergen ext-venom-honey bee] Anaphylaxis    Adhesive Blisters    Iodine and iodide containing products Hives     Allergic to iodine in seafood only    Shellfish containing products     Nuts [tree nut] Rash       No current facility-administered medications on file prior to encounter.     Current Outpatient Medications on File Prior to Encounter   Medication Sig    acetaminophen (TYLENOL) 325 MG tablet Take 2 tablets (650 mg total) by mouth every 6 (six) hours as needed for Temperature greater than (100.4 F).    albuterol (ACCUNEB) 1.25 mg/3 mL Nebu Take 3 mLs (1.25 mg total) by nebulization every 6 (six) hours as needed (shortness of breath). Rescue    buPROPion (WELLBUTRIN) 100 MG tablet Take 1 tablet (100 mg total) by mouth 2 (two) times daily.    ciprofloxacin HCl (CIPRO) 500 MG tablet Take 500 mg by mouth 2 (two) times daily.    folic acid (FOLVITE) 1 MG tablet Take 1 tablet (1 mg total) by mouth once daily. (Patient not taking: Reported on 11/25/2022)    pantoprazole (PROTONIX) 40 MG tablet Take 1 tablet (40 mg total) by mouth once daily.    pregabalin (LYRICA) 75 MG capsule Take 1 capsule (75 mg total) by mouth 3 (three) times daily.    sulfamethoxazole-trimethoprim 800-160mg (BACTRIM DS) 800-160 mg Tab Take 2 tablets by mouth 2 (two) times daily.    [DISCONTINUED] diclofenac sodium (VOLTAREN) 1 % Gel Apply 2 g topically 4 (four) times daily.     Family History       Problem Relation (Age of Onset)    Cirrhosis Father    Drug abuse Mother, Father    Hepatitis Mother, Father    Liver cancer Mother          Tobacco Use    Smoking status: Some Days     Packs/day: 0.50     Years: 23.00     Pack years: 11.50     Types: Cigarettes    Smokeless tobacco: Never    Tobacco comments:      patient states she knows she nees to quit.   Substance and Sexual Activity    Alcohol use: Not Currently     Comment: quit 2014    Drug use: Yes     Frequency: 4.0 times per week     Types: Marijuana     Comment: Patient denies needle use, only inhalation of methampehtamines or orally.  Last known drug use was prior to admission to hospital stay for surgery    Sexual activity: Yes     Partners: Male     Birth control/protection: None     Review of Systems   Unable to perform ROS: Acuity of condition   Objective:     Vital Signs (Most Recent):  Temp: 98.5 °F (36.9 °C) (12/31/22 1908)  Pulse: 93 (12/31/22 1908)  Resp: 19 (12/31/22 1908)  BP: (!) 132/59 (12/31/22 1908)  SpO2: 96 % (12/31/22 1908)   Vital Signs (24h Range):  Temp:  [98.2 °F (36.8 °C)-98.6 °F (37 °C)] 98.5 °F (36.9 °C)  Pulse:  [88-98] 93  Resp:  [16-20] 19  SpO2:  [95 %-99 %] 96 %  BP: (112-132)/(59-70) 132/59     Weight: 97.5 kg (215 lb)  Body mass index is 33.67 kg/m².    Physical Exam  Vitals and nursing note reviewed.   Constitutional:       General: She is not in acute distress.     Appearance: She is obese. She is ill-appearing.   HENT:      Head: Normocephalic and atraumatic.      Nose: Nose normal. No congestion or rhinorrhea.      Mouth/Throat:      Mouth: Mucous membranes are dry.      Pharynx: No oropharyngeal exudate.   Eyes:      General: No scleral icterus.  Neck:      Vascular: No carotid bruit.   Cardiovascular:      Rate and Rhythm: Regular rhythm. Tachycardia present.      Heart sounds: Murmur heard.     No friction rub. No gallop.   Pulmonary:      Effort: No respiratory distress.      Breath sounds: No stridor. No wheezing, rhonchi or rales.   Chest:      Chest wall: No tenderness.   Abdominal:      General: There is no distension.      Palpations: There is no mass.      Tenderness: There is no abdominal tenderness. There is no right CVA tenderness, left CVA tenderness, guarding or rebound.      Hernia: No hernia is present.    Musculoskeletal:         General: No swelling, tenderness or deformity.      Cervical back: Neck supple. No rigidity or tenderness.      Right lower leg: Edema present.      Left lower leg: Edema present.   Lymphadenopathy:      Cervical: No cervical adenopathy.   Skin:     Capillary Refill: Capillary refill takes less than 2 seconds.      Coloration: Skin is not jaundiced or pale.   Neurological:      Cranial Nerves: No cranial nerve deficit.      Sensory: Sensory deficit present.      Motor: Weakness present.   Psychiatric:      Comments: Calm, cooperative, moving all extremities.           Significant Labs: CBC:   Recent Labs   Lab 12/30/22  0616 12/31/22  0600   WBC 1.65* 1.46*   HGB 7.9* 8.1*   HCT 25.5* 25.5*   PLT 66* 58*       CMP:   Recent Labs   Lab 12/30/22  0616 12/31/22  0600    141   K 3.8 3.9    106   CO2 33* 33*    102   BUN 7 7   CREATININE 0.4* 0.5   CALCIUM 7.5* 7.7*   PROT 6.3 6.4   ALBUMIN 1.7* 1.6*   BILITOT 0.8 0.9   ALKPHOS 117 126   AST 35 34   ALT 16 16   ANIONGAP 1* 2*       Urine Studies:   No results for input(s): COLORU, APPEARANCEUA, PHUR, SPECGRAV, PROTEINUA, GLUCUA, KETONESU, BILIRUBINUA, OCCULTUA, NITRITE, UROBILINOGEN, LEUKOCYTESUR, RBCUA, WBCUA, BACTERIA, SQUAMEPITHEL, HYALINECASTS in the last 48 hours.    Invalid input(s): WRIGHTSUR      Significant Imaging: I have reviewed all pertinent imaging results/findings within the past 24 hours.      Assessment/Plan:      * Severe sepsis  This patient does have evidence of infective focus  My overall impression is sepsis. Vital signs were reviewed and noted in progress note.  Antibiotics given-   Antibiotics (From admission, onward)    Start     Stop Route Frequency Ordered    12/26/22 1515  meropenem-0.9% sodium chloride 1 g/50 mL IVPB        See Hyperspace for full Linked Orders Report.    -- IV Every 8 hours (non-standard times) 12/26/22 1409    12/26/22 1500  meropenem-0.9% sodium chloride 1 g/50 mL IVPB         [facility]    -- IV Every 8 hours (non-standard times) 12/26/22 1409    12/23/22 0533  vancomycin (VANCOCIN) 1,000 mg injection        Note to Pharmacy: Created by cabinet override    12/23 1744   12/23/22 0533    12/23/22 0405  piperacillin-tazobactam (ZOSYN) 4.5 gram injection        Note to Pharmacy: Created by cabinet override    12/23 1614   12/23/22 0405        Cultures were taken-   Microbiology Results (last 7 days)     Procedure Component Value Units Date/Time    Blood culture [[id number]] Collected: 12/28/22 1058    Order Status: Completed Specimen: Blood Updated: 12/29/22 1812     Blood Culture, Routine No Growth to date      No Growth to date    Blood culture [[id number]] Collected: 12/28/22 1058    Order Status: Completed Specimen: Blood Updated: 12/29/22 1812     Blood Culture, Routine No Growth to date      No Growth to date    Blood culture #2 **CANNOT BE ORDERED STAT** [[id number]] Collected: 12/23/22 0257    Order Status: Completed Specimen: Blood Updated: 12/28/22 2022     Blood Culture, Routine No growth after 5 days.    Urine culture [[id number]]  (Abnormal) Collected: 12/23/22 0501    Order Status: Completed Specimen: Urine Updated: 12/26/22 1250     Urine Culture, Routine JACINTA ALBICANS  50,000 - 99,999 cfu/ml  Treatment of asymptomatic candiduria is not recommended (except for   specific populations). Candida isolated in the urine typically   represents colonization. If an indwelling urinary catheter is present  it should be removed or replaced.      Narrative:      Preferred Collection Type->Urine, Clean Catch  Specimen Source->Urine    Blood culture #1 **CANNOT BE ORDERED STAT** [[id number]]  (Abnormal)  (Susceptibility) Collected: 12/23/22 0258    Order Status: Completed Specimen: Blood Updated: 12/26/22 1037     Blood Culture, Routine Gram stain aer bottle: Gram negative rods      Gram stain merissa bottle: Gram negative rods      Results called to and read  back by:Sari Deng RN 12/24/2022 05:57      ESCHERICHIA COLI ESBL        Latest lactate reviewed, they are-  No results for input(s): LACTATE in the last 72 hours.    Organ dysfunction indicated by Acute kidney injury, Encephalopathy  and Acute liver injury  Source- ?    Source control Achieved by- see orders    Merrem day 4 of 7      Pulmonary hypertension  Found incidentally on echocardiogram with evidence of elevated right sided pressures and dilated heart chambers on the right side. Unclear the exact etiology or class of Pulm HTN but given her significant substance abuse Class 1 certainly is a possibility.   - Will refer to outpatient pulmonologist.   - Will consider a cariology consult while inpatient to see if they think it would be beneficial to have a heart cath done while inpatient      Murmur, cardiac  No evidence of vegetations    Mild episode of recurrent major depressive disorder  Patient with altered mental status holding antidepressants for now      Cannabis use disorder, moderate, dependence  As above      Amphetamine use disorder, severe  Continue to  educate and support      Spinal stenosis at L4-L5 level        Substance use disorder        Chronic hepatitis C with cirrhosis  Labs noted      Cirrhosis of liver without ascites  Labs noted    AST   Date Value Ref Range Status   12/30/2022 35 10 - 40 U/L Final   12/29/2022 33 10 - 40 U/L Final   12/28/2022 29 10 - 40 U/L Final   12/27/2022 29 10 - 40 U/L Final   12/25/2022 31 10 - 40 U/L Final     ALT   Date Value Ref Range Status   12/30/2022 16 10 - 44 U/L Final   12/29/2022 16 10 - 44 U/L Final   12/28/2022 16 10 - 44 U/L Final   12/27/2022 14 10 - 44 U/L Final   12/25/2022 15 10 - 44 U/L Final        Alkaline Phosphatase   Date Value Ref Range Status   12/30/2022 117 55 - 135 U/L Final   12/29/2022 116 55 - 135 U/L Final   12/28/2022 109 55 - 135 U/L Final   12/27/2022 115 55 - 135 U/L Final   12/25/2022 111 55 - 135 U/L Final             UTI (urinary tract infection)  On abx, ? Source.  Suspect source of sepsis.    Treat candida x 2 weeks given hx.      VTE Risk Mitigation (From admission, onward)         Ordered     IP VTE HIGH RISK PATIENT  Once         12/23/22 0647     Place sequential compression device  Until discontinued         12/23/22 0647                Discharge Planning   SHIRA:      Code Status: Full Code   Is the patient medically ready for discharge?:     Reason for patient still in hospital (select all that apply): Treatment  Discharge Plan A: Home with family                  Eleazar Livingston DO  Department of Hospital Medicine   [facility]

## 2023-01-01 NOTE — PROGRESS NOTES
"[facility] Hospital Medicine  Progress Note    Patient Name: Rachel Zazueta  MRN: [id number]  Patient Class: IP- Inpatient   Admission Date: 12/23/2022  Length of Stay: 9 days  Attending Physician: Hal Barger Jr., MD  Primary Care Provider: Dannielle Merino DO        Subjective:     Principal Problem:Severe sepsis        HPI:  ER HPI:  42-year-old female with a history of anemia, anxiety, recurrent cellulitis, hepatitis C, IVDU, liver cirrhosis, cholecystectomy, kidney stones, lumbar fusion, shoulder surgery, chronic back pain comes in c/o generalized weakness, fatigue, and bilateral flank pain.  She reports that this has been going on intermittently for several days and was diagnosed with a UTI recently.  No fever.  No abdominal pain or vomiting or diarrhea.     IM HPI:  Patient is well known to our service.  Patient has a history of IV drug abuse and unfortunately has relapsed.  Patient has a history of a epidural abscess requiring a large lumbar surgery with multiple rehabilitative stays and therapy.  The patient was progressing to ambulation and recently has declined.  Patient admits to starting drugs including IV drug use again.  Patient presented to the ED with the above symptoms and she has a noticeable cardiac murmur today that we are getting a stat echocardiogram.  Patient's been started on antibiotics fluid resuscitated and seems to have sepsis.  Patient's urinary studies were abnormal but not overwhelming.      Overview/Hospital Course:  12/24/22 CG: Patient remains in the ICU today. Has a lot of muscle spasms and low back pain. Echocardiogram ordered yesterday and completed; significant for pulm HTN but without vegetations.   12/25/22 CG:  Overnight she was having a lot of significant discomfort and pain.  We placed her on Precedex and this has helped significantly with her body aches, her irritability and muscle spasms."  12/26/22 FM:  Patient required Precedex for agitation and " mood changes.  Patient is calm and cooperative this morning.  Patient appears to have E coli sepsis so will taper antibiotics.  Patient's echocardiogram showed no vegetation and E coli would be low risk for metastatic infection.  Will transfer the patient to the floor once her Precedex discontinue we counseled her again today on the importance of avoiding substances and illicit drugs.  This continues to be setback for her.  12/27/22 WC:  Patient overall remains stable and is slowly improving.  Urine culture has revealed Candida albicans in addition to blood culture revealing E coli.  12/28/22 WC:  Patient resting comfortably this morning.  Pharm ID on case for treatment duration recs.    12/29/22 WC:  no acute events overnight.  Back pain at this time.   12/20/22:  no acute events continue IV ABX for esbl  12/31/22 CG: stable, continuing antibiotics.   1/1/2 CG: stable, continuing antibiotics. Has not had a bowel movement in two weeks. Will give lactulose.        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9    CHOLECYSTECTOMY      KIDNEY SURGERY      LUMBAR FUSION Bilateral 3/2/2022    Procedure: FUSION, SPINE, LUMBAR;  Surgeon: Guille Templeton MD;  Location: [address];  Service: Neurosurgery;  Laterality: Bilateral;  AIRO  T10--Pelvis    REMOVAL OF HARDWARE FROM SPINE N/A 2/21/2022    Procedure: REMOVAL, HARDWARE, SPINE;  Surgeon: Guille Templeton MD;  Location: [facility] OR [address] [place] [address];  Service: Neurosurgery;  Laterality: N/A;  [place]    SPINE SURGERY          Review of patient's allergies indicates:   Allergen Reactions    Bee venom protein (honey bee) Anaphylaxis     Patient reports she is allergic to bee stings.    Naproxen Anaphylaxis     Throat closing    12-23- Patient reports taking Ibuprofen 200 mg at home without problems. Verified X3. [place]    Wasp sting [allergen ext-venom-honey bee] Anaphylaxis    Adhesive Blisters    Iodine and iodide containing products Hives     Allergic to iodine in seafood only    Shellfish containing products     Nuts [tree nut] Rash       No current facility-administered medications on file prior to encounter.     Current Outpatient Medications on File Prior to Encounter   Medication Sig    acetaminophen (TYLENOL) 325 MG tablet Take 2 tablets (650 mg total) by mouth every 6 (six) hours as needed for Temperature greater than (100.4 F).    albuterol (ACCUNEB) 1.25 mg/3 mL Nebu Take 3 mLs (1.25 mg total) by nebulization every 6 (six) hours as needed (shortness of breath). Rescue    buPROPion (WELLBUTRIN) 100 MG tablet Take 1 tablet (100 mg total) by mouth 2 (two) times daily.    ciprofloxacin HCl (CIPRO) 500 MG tablet Take 500 mg by mouth 2 (two) times daily.    folic acid (FOLVITE) 1 MG tablet Take 1 tablet (1 mg total) by mouth once daily. (Patient not taking: Reported on 11/25/2022)    pantoprazole (PROTONIX) 40 MG tablet Take 1 tablet (40 mg total) by mouth once daily.    pregabalin (LYRICA) 75 MG capsule Take 1 capsule (75 mg total) by mouth 3 (three) times daily.    sulfamethoxazole-trimethoprim 800-160mg (BACTRIM DS) 800-160 mg Tab Take 2 tablets by mouth 2 (two) times daily.    [DISCONTINUED] diclofenac sodium (VOLTAREN) 1 % Gel Apply 2 g topically 4 (four) times daily.     Family History       Problem Relation (Age of Onset)    Cirrhosis Father    Drug abuse Mother, Father    Hepatitis Mother, Father    Liver cancer Mother          Tobacco Use    Smoking status: Some Days     Packs/day: 0.50     Years: 23.00      Pack years: 11.50     Types: Cigarettes    Smokeless tobacco: Never    Tobacco comments:     patient states she knows she nees to quit.   Substance and Sexual Activity    Alcohol use: Not Currently     Comment: quit 2014    Drug use: Yes     Frequency: 4.0 times per week     Types: Marijuana     Comment: Patient denies needle use, only inhalation of methampehtamines or orally.  Last known drug use was prior to admission to hospital stay for surgery    Sexual activity: Yes     Partners: Male     Birth control/protection: None     Review of Systems   Unable to perform ROS: Acuity of condition   Objective:     Vital Signs (Most Recent):  Temp: 98.1 °F (36.7 °C) (01/01/23 1115)  Pulse: 87 (01/01/23 1115)  Resp: 18 (01/01/23 1115)  BP: 121/60 (01/01/23 1115)  SpO2: 98 % (01/01/23 1115)   Vital Signs (24h Range):  Temp:  [98 °F (36.7 °C)-98.6 °F (37 °C)] 98.1 °F (36.7 °C)  Pulse:  [87-94] 87  Resp:  [18-20] 18  SpO2:  [95 %-98 %] 98 %  BP: (105-132)/(56-64) 121/60     Weight: 97.5 kg (215 lb)  Body mass index is 33.67 kg/m².    Physical Exam  Vitals and nursing note reviewed.   Constitutional:       General: She is not in acute distress.     Appearance: She is obese. She is ill-appearing.   HENT:      Head: Normocephalic and atraumatic.      Nose: Nose normal. No congestion or rhinorrhea.      Mouth/Throat:      Mouth: Mucous membranes are dry.      Pharynx: No oropharyngeal exudate.   Eyes:      General: No scleral icterus.  Neck:      Vascular: No carotid bruit.   Cardiovascular:      Rate and Rhythm: Regular rhythm. Tachycardia present.      Heart sounds: Murmur heard.     No friction rub. No gallop.   Pulmonary:      Effort: No respiratory distress.      Breath sounds: No stridor. No wheezing, rhonchi or rales.   Chest:      Chest wall: No tenderness.   Abdominal:      General: There is no distension.      Palpations: There is no mass.      Tenderness: There is no abdominal tenderness. There is no right CVA  tenderness, left CVA tenderness, guarding or rebound.      Hernia: No hernia is present.   Musculoskeletal:         General: No swelling, tenderness or deformity.      Cervical back: Neck supple. No rigidity or tenderness.      Right lower leg: Edema present.      Left lower leg: Edema present.   Lymphadenopathy:      Cervical: No cervical adenopathy.   Skin:     Capillary Refill: Capillary refill takes less than 2 seconds.      Coloration: Skin is not jaundiced or pale.   Neurological:      Cranial Nerves: No cranial nerve deficit.      Sensory: Sensory deficit present.      Motor: Weakness present.   Psychiatric:      Comments: Calm, cooperative, moving all extremities.           Significant Labs: CBC:   Recent Labs   Lab 12/31/22  0600 01/01/23  0538   WBC 1.46* 2.00*   HGB 8.1* 8.5*   HCT 25.5* 26.1*   PLT 58* 66*       CMP:   Recent Labs   Lab 12/31/22  0600 01/01/23  0538    140   K 3.9 3.9    106   CO2 33* 31*    94   BUN 7 8   CREATININE 0.5 0.4*   CALCIUM 7.7* 7.8*   PROT 6.4 6.5   ALBUMIN 1.6* 1.7*   BILITOT 0.9 0.9   ALKPHOS 126 125   AST 34 34   ALT 16 16   ANIONGAP 2* 3*       Urine Studies:   No results for input(s): COLORU, APPEARANCEUA, PHUR, SPECGRAV, PROTEINUA, GLUCUA, KETONESU, BILIRUBINUA, OCCULTUA, NITRITE, UROBILINOGEN, LEUKOCYTESUR, RBCUA, WBCUA, BACTERIA, SQUAMEPITHEL, HYALINECASTS in the last 48 hours.    Invalid input(s): WRIGHTSUR      Significant Imaging: I have reviewed all pertinent imaging results/findings within the past 24 hours.      Assessment/Plan:      * Severe sepsis  This patient does have evidence of infective focus  My overall impression is sepsis. Vital signs were reviewed and noted in progress note.  Antibiotics given-   Antibiotics (From admission, onward)    Start     Stop Route Frequency Ordered    12/26/22 2140  meropenem-0.9% sodium chloride 1 g/50 mL IVPB        See Hyperspace for full Linked Orders Report.    -- IV Every 8 hours (non-standard  times) 12/26/22 1409    12/26/22 1500  meropenem-0.9% sodium chloride 1 g/50 mL IVPB        See Hyperspace for full Linked Orders Report.    -- IV Every 8 hours (non-standard times) 12/26/22 1409    12/23/22 0533  vancomycin (VANCOCIN) 1,000 mg injection        Note to Pharmacy: Created by cabinet override    12/23 1744   12/23/22 0533    12/23/22 0405  piperacillin-tazobactam (ZOSYN) 4.5 gram injection        Note to Pharmacy: Created by cabinet override    12/23 1614   12/23/22 0405        Cultures were taken-   Microbiology Results (last 7 days)     Procedure Component Value Units Date/Time    Blood culture [[id number]] Collected: 12/28/22 1058    Order Status: Completed Specimen: Blood Updated: 12/29/22 1812     Blood Culture, Routine No Growth to date      No Growth to date    Blood culture [[id number]] Collected: 12/28/22 1058    Order Status: Completed Specimen: Blood Updated: 12/29/22 1812     Blood Culture, Routine No Growth to date      No Growth to date    Blood culture #2 **CANNOT BE ORDERED STAT** [[id number]] Collected: 12/23/22 0257    Order Status: Completed Specimen: Blood Updated: 12/28/22 2022     Blood Culture, Routine No growth after 5 days.    Urine culture [[id number]]  (Abnormal) Collected: 12/23/22 0501    Order Status: Completed Specimen: Urine Updated: 12/26/22 1250     Urine Culture, Routine JACINTA ALBICANS  50,000 - 99,999 cfu/ml  Treatment of asymptomatic candiduria is not recommended (except for   specific populations). Candida isolated in the urine typically   represents colonization. If an indwelling urinary catheter is present  it should be removed or replaced.      Narrative:      Preferred Collection Type->Urine, Clean Catch  Specimen Source->Urine    Blood culture #1 **CANNOT BE ORDERED STAT** [[id number]]  (Abnormal)  (Susceptibility) Collected: 12/23/22 0258    Order Status: Completed Specimen: Blood Updated: 12/26/22 1037     Blood Culture, Routine Gram stain aer bottle: Gram  negative rods      Gram stain merissa bottle: Gram negative rods      Results called to and read back by:Sari Deng RN 12/24/2022 05:57      ESCHERICHIA COLI ESBL        Latest lactate reviewed, they are-  No results for input(s): LACTATE in the last 72 hours.    Organ dysfunction indicated by Acute kidney injury, Encephalopathy  and Acute liver injury  Source- ?    Source control Achieved by- see orders    Merrem day 4 of 7      Pulmonary hypertension  Found incidentally on echocardiogram with evidence of elevated right sided pressures and dilated heart chambers on the right side. Unclear the exact etiology or class of Pulm HTN but given her significant substance abuse Class 1 certainly is a possibility.   - Will refer to outpatient pulmonologist.   - Will consider a cariology consult while inpatient to see if they think it would be beneficial to have a heart cath done while inpatient      Murmur, cardiac  No evidence of vegetations    Mild episode of recurrent major depressive disorder  Patient with altered mental status holding antidepressants for now      Cannabis use disorder, moderate, dependence  As above      Amphetamine use disorder, severe  Continue to  educate and support      Spinal stenosis at L4-L5 level        Substance use disorder        Chronic hepatitis C with cirrhosis  Labs noted      Cirrhosis of liver without ascites  Labs noted    AST   Date Value Ref Range Status   12/30/2022 35 10 - 40 U/L Final   12/29/2022 33 10 - 40 U/L Final   12/28/2022 29 10 - 40 U/L Final   12/27/2022 29 10 - 40 U/L Final   12/25/2022 31 10 - 40 U/L Final     ALT   Date Value Ref Range Status   12/30/2022 16 10 - 44 U/L Final   12/29/2022 16 10 - 44 U/L Final   12/28/2022 16 10 - 44 U/L Final   12/27/2022 14 10 - 44 U/L Final   12/25/2022 15 10 - 44 U/L Final        Alkaline Phosphatase   Date Value Ref Range Status   12/30/2022 117 55 - 135 U/L Final   12/29/2022 116 55 - 135 U/L Final   12/28/2022 109 55  - 135 U/L Final   12/27/2022 115 55 - 135 U/L Final   12/25/2022 111 55 - 135 U/L Final            UTI (urinary tract infection)  On abx, ? Source.  Suspect source of sepsis.    Treat candida x 2 weeks given hx.      VTE Risk Mitigation (From admission, onward)         Ordered     IP VTE HIGH RISK PATIENT  Once         12/23/22 0647     Place sequential compression device  Until discontinued         12/23/22 0647                Discharge Planning   SHIRA:      Code Status: Full Code   Is the patient medically ready for discharge?:     Reason for patient still in hospital (select all that apply): Treatment  Discharge Plan A: Home with family                  Eleazar Livingston DO  Department of Hospital Medicine   [facility]

## 2023-01-02 LAB
ALBUMIN SERPL BCP-MCNC: 1.7 G/DL (ref 3.5–5.2)
ALP SERPL-CCNC: 136 U/L (ref 55–135)
ALT SERPL W/O P-5'-P-CCNC: 16 U/L (ref 10–44)
ANION GAP SERPL CALC-SCNC: 2 MMOL/L (ref 8–16)
AST SERPL-CCNC: 37 U/L (ref 10–40)
BACTERIA BLD CULT: NORMAL
BACTERIA BLD CULT: NORMAL
BASOPHILS # BLD AUTO: 0.02 K/UL (ref 0–0.2)
BASOPHILS NFR BLD: 0.7 % (ref 0–1.9)
BILIRUB SERPL-MCNC: 1.2 MG/DL (ref 0.1–1)
BUN SERPL-MCNC: 10 MG/DL (ref 6–20)
CALCIUM SERPL-MCNC: 7.8 MG/DL (ref 8.7–10.5)
CHLORIDE SERPL-SCNC: 107 MMOL/L (ref 95–110)
CO2 SERPL-SCNC: 30 MMOL/L (ref 23–29)
CREAT SERPL-MCNC: 0.4 MG/DL (ref 0.5–1.4)
DIFFERENTIAL METHOD: ABNORMAL
EOSINOPHIL # BLD AUTO: 0.1 K/UL (ref 0–0.5)
EOSINOPHIL NFR BLD: 3.6 % (ref 0–8)
ERYTHROCYTE [DISTWIDTH] IN BLOOD BY AUTOMATED COUNT: 17.6 % (ref 11.5–14.5)
EST. GFR  (NO RACE VARIABLE): >60 ML/MIN/1.73 M^2
GLUCOSE SERPL-MCNC: 89 MG/DL (ref 70–110)
HCT VFR BLD AUTO: 27.5 % (ref 37–48.5)
HGB BLD-MCNC: 8.8 G/DL (ref 12–16)
IMM GRANULOCYTES # BLD AUTO: 0.02 K/UL (ref 0–0.04)
IMM GRANULOCYTES NFR BLD AUTO: 0.7 % (ref 0–0.5)
LYMPHOCYTES # BLD AUTO: 0.6 K/UL (ref 1–4.8)
LYMPHOCYTES NFR BLD: 21.4 % (ref 18–48)
MAGNESIUM SERPL-MCNC: 1.9 MG/DL (ref 1.6–2.6)
MCH RBC QN AUTO: 27.9 PG (ref 27–31)
MCHC RBC AUTO-ENTMCNC: 32 G/DL (ref 32–36)
MCV RBC AUTO: 87 FL (ref 82–98)
MONOCYTES # BLD AUTO: 0.2 K/UL (ref 0.3–1)
MONOCYTES NFR BLD: 7.6 % (ref 4–15)
NEUTROPHILS # BLD AUTO: 1.8 K/UL (ref 1.8–7.7)
NEUTROPHILS NFR BLD: 66 % (ref 38–73)
NRBC BLD-RTO: 0 /100 WBC
PLATELET # BLD AUTO: 71 K/UL (ref 150–450)
PMV BLD AUTO: 9.6 FL (ref 9.2–12.9)
POTASSIUM SERPL-SCNC: 4 MMOL/L (ref 3.5–5.1)
PROT SERPL-MCNC: 6.8 G/DL (ref 6–8.4)
RBC # BLD AUTO: 3.15 M/UL (ref 4–5.4)
SODIUM SERPL-SCNC: 139 MMOL/L (ref 136–145)
WBC # BLD AUTO: 2.76 K/UL (ref 3.9–12.7)

## 2023-01-02 PROCEDURE — 11000001 HC ACUTE MED/SURG PRIVATE ROOM

## 2023-01-02 PROCEDURE — 80053 COMPREHEN METABOLIC PANEL: CPT | Performed by: INTERNAL MEDICINE

## 2023-01-02 PROCEDURE — 63600175 PHARM REV CODE 636 W HCPCS: Performed by: INTERNAL MEDICINE

## 2023-01-02 PROCEDURE — 25000003 PHARM REV CODE 250: Performed by: INTERNAL MEDICINE

## 2023-01-02 PROCEDURE — 97530 THERAPEUTIC ACTIVITIES: CPT

## 2023-01-02 PROCEDURE — 85025 COMPLETE CBC W/AUTO DIFF WBC: CPT | Performed by: INTERNAL MEDICINE

## 2023-01-02 PROCEDURE — 83735 ASSAY OF MAGNESIUM: CPT | Performed by: INTERNAL MEDICINE

## 2023-01-02 PROCEDURE — 36415 COLL VENOUS BLD VENIPUNCTURE: CPT | Performed by: INTERNAL MEDICINE

## 2023-01-02 PROCEDURE — A4216 STERILE WATER/SALINE, 10 ML: HCPCS | Performed by: INTERNAL MEDICINE

## 2023-01-02 PROCEDURE — 27000207 HC ISOLATION

## 2023-01-02 RX ADMIN — LACTULOSE 20 G: 20 SOLUTION ORAL at 08:01

## 2023-01-02 RX ADMIN — Medication 10 ML: at 07:01

## 2023-01-02 RX ADMIN — MEROPENEM AND SODIUM CHLORIDE 1 G: 1 INJECTION, SOLUTION INTRAVENOUS at 06:01

## 2023-01-02 RX ADMIN — HYDROCODONE BITARTRATE AND ACETAMINOPHEN 1 TABLET: 5; 325 TABLET ORAL at 08:01

## 2023-01-02 RX ADMIN — Medication 10 ML: at 12:01

## 2023-01-02 RX ADMIN — METHOCARBAMOL TABLETS 500 MG: 500 TABLET, COATED ORAL at 08:01

## 2023-01-02 RX ADMIN — Medication 10 ML: at 11:01

## 2023-01-02 RX ADMIN — MEROPENEM 1 G: 1 INJECTION, POWDER, FOR SOLUTION INTRAVENOUS at 04:01

## 2023-01-02 RX ADMIN — MORPHINE SULFATE 2 MG: 2 INJECTION, SOLUTION INTRAMUSCULAR; INTRAVENOUS at 11:01

## 2023-01-02 RX ADMIN — PREGABALIN 75 MG: 75 CAPSULE ORAL at 09:01

## 2023-01-02 RX ADMIN — METHOCARBAMOL TABLETS 500 MG: 500 TABLET, COATED ORAL at 12:01

## 2023-01-02 RX ADMIN — MEROPENEM 1 G: 1 INJECTION, POWDER, FOR SOLUTION INTRAVENOUS at 11:01

## 2023-01-02 RX ADMIN — MORPHINE SULFATE 2 MG: 2 INJECTION, SOLUTION INTRAMUSCULAR; INTRAVENOUS at 01:01

## 2023-01-02 RX ADMIN — POLYETHYLENE GLYCOL (3350) 17 G: 17 POWDER, FOR SOLUTION ORAL at 08:01

## 2023-01-02 RX ADMIN — METHOCARBAMOL TABLETS 500 MG: 500 TABLET, COATED ORAL at 09:01

## 2023-01-02 RX ADMIN — PREGABALIN 75 MG: 75 CAPSULE ORAL at 08:01

## 2023-01-02 RX ADMIN — ALPRAZOLAM 0.5 MG: 0.5 TABLET ORAL at 08:01

## 2023-01-02 RX ADMIN — PANTOPRAZOLE SODIUM 40 MG: 40 TABLET, DELAYED RELEASE ORAL at 08:01

## 2023-01-02 RX ADMIN — HYDROCODONE BITARTRATE AND ACETAMINOPHEN 1 TABLET: 5; 325 TABLET ORAL at 04:01

## 2023-01-02 RX ADMIN — Medication 10 ML: at 06:01

## 2023-01-02 RX ADMIN — Medication 10 ML: at 05:01

## 2023-01-02 RX ADMIN — METHOCARBAMOL TABLETS 500 MG: 500 TABLET, COATED ORAL at 04:01

## 2023-01-02 RX ADMIN — MORPHINE SULFATE 2 MG: 2 INJECTION, SOLUTION INTRAMUSCULAR; INTRAVENOUS at 07:01

## 2023-01-02 NOTE — PT/OT/SLP PROGRESS
Physical Therapy Treatment    Patient Name:  Rachel Zazueta   MRN:  [id number]    Recommendations:     Discharge Recommendations:  (to be determined)  Discharge Equipment Recommendations:  (TBD)  Barriers to discharge:  Pain, current medical and functional status    Assessment:     Rachel Zazueta is a 42 y.o. female admitted with a medical diagnosis of Severe sepsis.  She presents with the following impairments/functional limitations: weakness, impaired endurance, impaired self care skills, impaired functional mobility, gait instability, decreased safety awareness, pain, decreased upper extremity function, decreased lower extremity function, edema . Pt limited with mobility task with pt resisting attempts to sit at edge of bed due to c/o increase back pain.    Rehab Prognosis: Fair; patient would benefit from acute skilled PT services to address these deficits and reach maximum level of function.    Recent Surgery: * No surgery found *      Plan:     During this hospitalization, patient to be seen 5 x/week to address the identified rehab impairments via gait training, therapeutic activities, therapeutic exercises and progress toward the following goals:    Plan of Care Expires:  01/09/23    Subjective     Chief Complaint: Pt hesitant with attempting OOB activity, reports of inc. Nausea when attempting to sit up  Patient/Family Comments/goals: to be in less pin  Pain/Comfort:  Pain Rating 1: 10/10  Location 1: back  Pain Addressed 1: Reposition, Distraction, Cessation of Activity  Pain Rating Post-Intervention 1: 7/10      Objective:     Communicated with patient prior to session.  Patient found supine with PureWick, peripheral IV, telemetry upon PT entry to room.     General Precautions: Standard, fall  Orthopedic Precautions: N/A  Braces: N/A       Functional Mobility:  Bed Mobility:     Rolling Left:  moderate assistance and maximal assistance  Rolling Right: moderate assistance and maximal  assistance  Scooting: moderate assistance and maximal assistance  Bridging: moderate assistance and maximal assistance  Supine to Sit: maximal assistance  Sit to Supine: maximal assistance   - pt only able to assume to shelter sitting at edge of bed with pt refusing to go further due to inc. Back pain    AM-PAC 6 CLICK MOBILITY  Turning over in bed (including adjusting bedclothes, sheets and blankets)?: 2  Sitting down on and standing up from a chair with arms (e.g., wheelchair, bedside commode, etc.): 1  Moving from lying on back to sitting on the side of the bed?: 2  Moving to and from a bed to a chair (including a wheelchair)?: 1  Need to walk in hospital room?: 1  Climbing 3-5 steps with a railing?: 1  Basic Mobility Total Score: 8       Treatment & Education:  Pt able to tolerate LE AAROM to Bilateral LE x 5 reps, rolling, scooting and attempted sitting at EOB    Patient left supine with all lines intact and call button in reach..    GOALS:   Multidisciplinary Problems       Physical Therapy Goals          Problem: Physical Therapy    Goal Priority Disciplines Outcome Goal Variances Interventions   Physical Therapy Goal     PT, PT/OT Ongoing, Progressing     Description: Goals to be met by: 2023     Patient will increase functional independence with mobility by performin. Supine to sit with Modified [place]  2. Sit to supine with Modified [place]  3. Sit to stand transfer with Stand-by Assistance  4. Bed to chair transfer with Stand-by Assistance using Rolling Walker  5. Gait  x 50 feet with Contact Guard Assistance using Rolling Walker.   6. Sitting at edge of bed x10 minutes with [place] to perform (B) LE therex                           Time Tracking:     PT Received On: 23  PT Start Time: 1430     PT Stop Time: 1500  PT Total Time (min): 30 min     Billable Minutes: Therapeutic Activity 15    Treatment Type: Treatment  PT/PTA: PT           2023

## 2023-01-02 NOTE — PLAN OF CARE
Problem: Physical Therapy  Goal: Physical Therapy Goal  Description: Goals to be met by: 2023     Patient will increase functional independence with mobility by performin. Supine to sit with Modified [place]  2. Sit to supine with Modified [place]  3. Sit to stand transfer with Stand-by Assistance  4. Bed to chair transfer with Stand-by Assistance using Rolling Walker  5. Gait  x 50 feet with Contact Guard Assistance using Rolling Walker.   6. Sitting at edge of bed x10 minutes with [place] to perform (B) LE therex      Outcome: Ongoing, Progressing

## 2023-01-02 NOTE — ASSESSMENT & PLAN NOTE
This patient does have evidence of infective focus  My overall impression is sepsis. Vital signs were reviewed and noted in progress note.  Antibiotics given-   Antibiotics (From admission, onward)    Start     Stop Route Frequency Ordered    01/02/23 1515  meropenem (MERREM) 1 g in sodium chloride 0.9 % 100 mL IVPB (MB+)        See Hyperspace for full Linked Orders Report.    01/03 0714 IV Every 8 hours (non-standard times) 01/02/23 0923    01/02/23 1515  meropenem (MERREM) 1 g in sodium chloride 0.9 % 100 mL IVPB (MB+)        See Hyperspace for full Linked Orders Report.    01/03 0714 IV Every 8 hours (non-standard times) 01/02/23 0923    12/23/22 0533  vancomycin (VANCOCIN) 1,000 mg injection        Note to Pharmacy: Created by cabinet override    12/23 1744   12/23/22 0533    12/23/22 0405  piperacillin-tazobactam (ZOSYN) 4.5 gram injection        Note to Pharmacy: Created by cabinet override    12/23 1614   12/23/22 0405        Cultures were taken-   Microbiology Results (last 7 days)     Procedure Component Value Units Date/Time    Blood culture [[id number]] Collected: 12/28/22 1058    Order Status: Completed Specimen: Blood Updated: 01/01/23 1812     Blood Culture, Routine No Growth to date      No Growth to date      No Growth to date      No Growth to date      No Growth to date    Blood culture [[id number]] Collected: 12/28/22 1058    Order Status: Completed Specimen: Blood Updated: 01/01/23 1812     Blood Culture, Routine No Growth to date      No Growth to date      No Growth to date      No Growth to date      No Growth to date    Blood culture #2 **CANNOT BE ORDERED STAT** [[id number]] Collected: 12/23/22 0257    Order Status: Completed Specimen: Blood Updated: 12/28/22 2022     Blood Culture, Routine No growth after 5 days.        Latest lactate reviewed, they are-  No results for input(s): LACTATE in the last 72 hours.    Organ dysfunction indicated by Acute kidney injury, Encephalopathy  and Acute  liver injury  Source- ?    Source control Achieved by- see orders    Merrem day 7 of 7

## 2023-01-02 NOTE — PT/OT/SLP PROGRESS
Occupational Therapy   Treatment    Name: Rachel Zazueta  MRN: [id number]  Admitting Diagnosis:  Severe sepsis       Recommendations:     Discharge Recommendations: other (see comments) (to be determined by progress until discharge)  Discharge Equipment Recommendations:  other (see comments) (to be determined prior to discharge)  Barriers to discharge:  Other (Comment) (Medical and functional status)    Assessment:     Rachel Zazueta is a 42 y.o. female with a medical diagnosis of Severe sepsis.  She presents with continued pain in back impacting activity tolerance. Performance deficits affecting function are weakness, impaired endurance, impaired sensation, impaired self care skills, impaired functional mobility, decreased safety awareness, pain.     Rehab Prognosis:  Fair; patient would benefit from acute skilled OT services to address these deficits and reach maximum level of function.       Plan:     Patient to be seen 5 x/week to address the above listed problems via self-care/home management, therapeutic activities, therapeutic exercises, sensory integration  Plan of Care Expires: 01/02/23  Plan of Care Reviewed with: patient    Subjective     Pain/Comfort:  Pain Rating 1: 10/10  Location - Orientation 1: lower  Location 1: back  Pain Addressed 1: Reposition, Cessation of Activity, Nurse notified, Pre-medicate for activity  Pain Rating Post-Intervention 1: 8/10    Objective:     Communicated with: nurse prior to session.  Patient found supine with telemetry, peripheral IV, PureWick upon OT entry to room.    General Precautions: Standard, fall    Orthopedic Precautions:N/A  Braces: N/A  Respiratory Status: Room air     Occupational Performance:     Bed Mobility:    Patient completed Rolling/Turning to Left with  moderate assistance  Patient completed Rolling/Turning to Right with moderate assistance  Patient completed Scooting/Bridging with moderate assistance  Patient completed Supine to Sit with maximal  assistance  Patient completed Sit to Supine with moderate assistance     Functional Mobility/Transfers:  Pt refused to perform on this date.    Treatment & Education:  Pt was cooperative with continued verbal encouragement while exhibiting severe pain and anxiety with activity. She performed bed mobility requiring mod to max assist secondary to lifting assist and stabilization of trunk during supine to sit transition, scooting assist at bilateral hips to EOB, and lifting assist of bilateral LE's during sit to supine transition with verbal and tactile cueing for technique to reduce pain. Pt then participated in therapeutic activity addressing functional reaching, gross motor coordination, trunk control, static / dynamic sitting balance, and energy for task / endurance challenging her to reach in multiple planes utilizing bilateral Ue's requiring verbal and tactile cueing to facilitate proper weight shifting and sitting posture tolerating 11 min at EOB in which patient refused further activity.    Patient left HOB elevated with all lines intact, call button in reach, and nurse notified    GOALS:   Multidisciplinary Problems       Occupational Therapy Goals          Problem: Occupational Therapy    Goal Priority Disciplines Outcome Interventions   Occupational Therapy Goal     OT, PT/OT Ongoing, Progressing    Description: Goals to be met by: 01/02/22     Patient will increase functional independence with ADLs by performing:    UE Dressing with Set-up Assistance.  LE Dressing with Minimal Assistance.  Grooming while seated with Modified [place].  Toileting from toilet with Supervision for hygiene and clothing management.   Bathing from  shower chair/bench with Minimal Assistance.  Step transfer with Minimal Assistance  Toilet transfer to toilet with Minimal Assistance.  Increased functional strength to 4/5 for bilateral UE's.                         Time Tracking:     OT Date of Treatment: 01/02/23  OT Start Time:  1610  OT Stop Time: 1628  OT Total Time (min): 18 min    Billable Minutes:Therapeutic Activity 18    OT/ROSS: OT     ROSS Visit Number: 1 1/2/2023

## 2023-01-02 NOTE — PLAN OF CARE
Problem: Adult Inpatient Plan of Care  Goal: Optimal Comfort and Wellbeing  Outcome: Ongoing, Not Progressing  Goal: Readiness for Transition of Care  Outcome: Ongoing, Not Progressing     Problem: Fall Injury Risk  Goal: Absence of Fall and Fall-Related Injury  Outcome: Ongoing, Not Progressing         Problem: Infection  Goal: Absence of Infection Signs and Symptoms  Outcome: Ongoing, Progressing     Problem: Adult Inpatient Plan of Care  Goal: Plan of Care Review  Outcome: Ongoing, Progressing  Goal: Patient-Specific Goal (Individualized)  Outcome: Ongoing, Progressing  Goal: Absence of Hospital-Acquired Illness or Injury  Outcome: Ongoing, Progressing     Problem: Adjustment to Illness (Sepsis/Septic Shock)  Goal: Optimal Coping  Outcome: Ongoing, Progressing     Problem: Bleeding (Sepsis/Septic Shock)  Goal: Absence of Bleeding  Outcome: Ongoing, Progressing     Problem: Glycemic Control Impaired (Sepsis/Septic Shock)  Goal: Blood Glucose Level Within Desired Range  Outcome: Ongoing, Progressing     Problem: Infection Progression (Sepsis/Septic Shock)  Goal: Absence of Infection Signs and Symptoms  Outcome: Ongoing, Progressing     Problem: Nutrition Impaired (Sepsis/Septic Shock)  Goal: Optimal Nutrition Intake  Outcome: Ongoing, Progressing     Problem: Skin Injury Risk Increased  Goal: Skin Health and Integrity  Outcome: Ongoing, Progressing

## 2023-01-02 NOTE — ASSESSMENT & PLAN NOTE
Labs noted    AST   Date Value Ref Range Status   01/02/2023 37 10 - 40 U/L Final   01/01/2023 34 10 - 40 U/L Final   12/31/2022 34 10 - 40 U/L Final   12/30/2022 35 10 - 40 U/L Final   12/29/2022 33 10 - 40 U/L Final     ALT   Date Value Ref Range Status   01/02/2023 16 10 - 44 U/L Final   01/01/2023 16 10 - 44 U/L Final   12/31/2022 16 10 - 44 U/L Final   12/30/2022 16 10 - 44 U/L Final   12/29/2022 16 10 - 44 U/L Final        Alkaline Phosphatase   Date Value Ref Range Status   01/02/2023 136 (H) 55 - 135 U/L Final   01/01/2023 125 55 - 135 U/L Final   12/31/2022 126 55 - 135 U/L Final   12/30/2022 117 55 - 135 U/L Final   12/29/2022 116 55 - 135 U/L Final

## 2023-01-02 NOTE — SUBJECTIVE & OBJECTIVE
Past Medical History:   Diagnosis Date    Anemia     Anxiety     Depressed     Diskitis     Hep C w/o coma, chronic     IV drug abuse     Kidney stone     Liver cirrhosis        Past Surgical History:   Procedure Laterality Date    ARTHROTOMY OF SHOULDER Left 11/15/2022    Procedure: ARTHROTOMY, SHOULDER;  Surgeon: Bjorn Hayes MD;  Location: [facility];  Service: Orthopedics;  Laterality: Left;  Left acromioclavicular joint arthrotomy    BACK SURGERY      benine tumor removal      forehead, age 9    CHOLECYSTECTOMY      KIDNEY SURGERY      LUMBAR FUSION Bilateral 3/2/2022    Procedure: FUSION, SPINE, LUMBAR;  Surgeon: Guille Templeton MD;  Location: [address];  Service: Neurosurgery;  Laterality: Bilateral;  AIRO  T10--Pelvis    REMOVAL OF HARDWARE FROM SPINE N/A 2/21/2022    Procedure: REMOVAL, HARDWARE, SPINE;  Surgeon: Guille Templeton MD;  Location: [address];  Service: Neurosurgery;  Laterality: N/A;  [place]    SPINE SURGERY         Review of patient's allergies indicates:   Allergen Reactions    Bee venom protein (honey bee) Anaphylaxis     Patient reports she is allergic to bee stings.    Naproxen Anaphylaxis     Throat closing    12-23- Patient reports taking Ibuprofen 200 mg at home without problems. Verified X3. [place]    Wasp sting [allergen ext-venom-honey bee] Anaphylaxis    Adhesive Blisters    Iodine and iodide containing products Hives     Allergic to iodine in seafood only    Shellfish containing products     Nuts [tree nut] Rash       No current facility-administered medications on file prior to encounter.     Current Outpatient Medications on File Prior to Encounter   Medication Sig    acetaminophen (TYLENOL) 325 MG tablet Take 2 tablets (650 mg total) by mouth every 6 (six) hours as needed for Temperature greater than (100.4 F).    albuterol (ACCUNEB) 1.25 mg/3 mL Nebu Take 3 mLs (1.25 mg total) by nebulization every 6 (six) hours as needed (shortness of breath). Rescue    buPROPion  (WELLBUTRIN) 100 MG tablet Take 1 tablet (100 mg total) by mouth 2 (two) times daily.    ciprofloxacin HCl (CIPRO) 500 MG tablet Take 500 mg by mouth 2 (two) times daily.    folic acid (FOLVITE) 1 MG tablet Take 1 tablet (1 mg total) by mouth once daily. (Patient not taking: Reported on 11/25/2022)    pantoprazole (PROTONIX) 40 MG tablet Take 1 tablet (40 mg total) by mouth once daily.    pregabalin (LYRICA) 75 MG capsule Take 1 capsule (75 mg total) by mouth 3 (three) times daily.    sulfamethoxazole-trimethoprim 800-160mg (BACTRIM DS) 800-160 mg Tab Take 2 tablets by mouth 2 (two) times daily.    [DISCONTINUED] diclofenac sodium (VOLTAREN) 1 % Gel Apply 2 g topically 4 (four) times daily.     Family History       Problem Relation (Age of Onset)    Cirrhosis Father    Drug abuse Mother, Father    Hepatitis Mother, Father    Liver cancer Mother          Tobacco Use    Smoking status: Some Days     Packs/day: 0.50     Years: 23.00     Pack years: 11.50     Types: Cigarettes    Smokeless tobacco: Never    Tobacco comments:     patient states she knows she nees to quit.   Substance and Sexual Activity    Alcohol use: Not Currently     Comment: quit 2014    Drug use: Yes     Frequency: 4.0 times per week     Types: Marijuana     Comment: Patient denies needle use, only inhalation of methampehtamines or orally.  Last known drug use was prior to admission to hospital stay for surgery    Sexual activity: Yes     Partners: Male     Birth control/protection: None     Review of Systems   Unable to perform ROS: Acuity of condition   Objective:     Vital Signs (Most Recent):  Temp: 99 °F (37.2 °C) (01/02/23 1605)  Pulse: 97 (01/02/23 1605)  Resp: 20 (01/02/23 1634)  BP: (!) 111/57 (01/02/23 1605)  SpO2: 100 % (01/02/23 1605)   Vital Signs (24h Range):  Temp:  [98 °F (36.7 °C)-99 °F (37.2 °C)] 99 °F (37.2 °C)  Pulse:  [88-98] 97  Resp:  [18-20] 20  SpO2:  [95 %-100 %] 100 %  BP: ()/(54-63) 111/57     Weight: 97.5 kg (215  lb)  Body mass index is 33.67 kg/m².    Physical Exam  Vitals and nursing note reviewed.   Constitutional:       General: She is not in acute distress.     Appearance: She is obese. She is ill-appearing.   HENT:      Head: Normocephalic and atraumatic.      Nose: Nose normal. No congestion or rhinorrhea.      Mouth/Throat:      Mouth: Mucous membranes are dry.      Pharynx: No oropharyngeal exudate.   Eyes:      General: No scleral icterus.  Neck:      Vascular: No carotid bruit.   Cardiovascular:      Rate and Rhythm: Regular rhythm. Tachycardia present.      Heart sounds: Murmur heard.     No friction rub. No gallop.   Pulmonary:      Effort: No respiratory distress.      Breath sounds: No stridor. No wheezing, rhonchi or rales.   Chest:      Chest wall: No tenderness.   Abdominal:      General: There is no distension.      Palpations: There is no mass.      Tenderness: There is no abdominal tenderness. There is no right CVA tenderness, left CVA tenderness, guarding or rebound.      Hernia: No hernia is present.   Musculoskeletal:         General: No swelling, tenderness or deformity.      Cervical back: Neck supple. No rigidity or tenderness.      Right lower leg: Edema present.      Left lower leg: Edema present.   Lymphadenopathy:      Cervical: No cervical adenopathy.   Skin:     Capillary Refill: Capillary refill takes less than 2 seconds.      Coloration: Skin is not jaundiced or pale.   Neurological:      Cranial Nerves: No cranial nerve deficit.      Sensory: Sensory deficit present.      Motor: Weakness present.   Psychiatric:      Comments: Calm, cooperative, moving all extremities.           Significant Labs: CBC:   Recent Labs   Lab 01/01/23  0538 01/02/23  0530   WBC 2.00* 2.76*   HGB 8.5* 8.8*   HCT 26.1* 27.5*   PLT 66* 71*       CMP:   Recent Labs   Lab 01/01/23  0538 01/02/23  0530    139   K 3.9 4.0    107   CO2 31* 30*   GLU 94 89   BUN 8 10   CREATININE 0.4* 0.4*   CALCIUM 7.8* 7.8*    PROT 6.5 6.8   ALBUMIN 1.7* 1.7*   BILITOT 0.9 1.2*   ALKPHOS 125 136*   AST 34 37   ALT 16 16   ANIONGAP 3* 2*       Urine Studies:   No results for input(s): COLORU, APPEARANCEUA, PHUR, SPECGRAV, PROTEINUA, GLUCUA, KETONESU, BILIRUBINUA, OCCULTUA, NITRITE, UROBILINOGEN, LEUKOCYTESUR, RBCUA, WBCUA, BACTERIA, SQUAMEPITHEL, HYALINECASTS in the last 48 hours.    Invalid input(s): TREY      Significant Imaging: I have reviewed all pertinent imaging results/findings within the past 24 hours.

## 2023-01-02 NOTE — PROGRESS NOTES
"[facility] Medicine  Progress Note    Patient Name: Rachel Zazueta  MRN: [id number]  Patient Class: IP- Inpatient   Admission Date: 12/23/2022  Length of Stay: 10 days  Attending Physician: Hal Barger Jr., MD  Primary Care Provider: Dannielle Merino DO        Subjective:     Principal Problem:Severe sepsis        HPI:  ER HPI:  42-year-old female with a history of anemia, anxiety, recurrent cellulitis, hepatitis C, IVDU, liver cirrhosis, cholecystectomy, kidney stones, lumbar fusion, shoulder surgery, chronic back pain comes in c/o generalized weakness, fatigue, and bilateral flank pain.  She reports that this has been going on intermittently for several days and was diagnosed with a UTI recently.  No fever.  No abdominal pain or vomiting or diarrhea.     IM HPI:  Patient is well known to our service.  Patient has a history of IV drug abuse and unfortunately has relapsed.  Patient has a history of a epidural abscess requiring a large lumbar surgery with multiple rehabilitative stays and therapy.  The patient was progressing to ambulation and recently has declined.  Patient admits to starting drugs including IV drug use again.  Patient presented to the ED with the above symptoms and she has a noticeable cardiac murmur today that we are getting a stat echocardiogram.  Patient's been started on antibiotics fluid resuscitated and seems to have sepsis.  Patient's urinary studies were abnormal but not overwhelming.      Overview/Hospital Course:  12/24/22 CG: Patient remains in the ICU today. Has a lot of muscle spasms and low back pain. Echocardiogram ordered yesterday and completed; significant for pulm HTN but without vegetations.   12/25/22 CG:  Overnight she was having a lot of significant discomfort and pain.  We placed her on Precedex and this has helped significantly with her body aches, her irritability and muscle spasms."  12/26/22 FM:  Patient required Precedex for agitation and " mood changes.  Patient is calm and cooperative this morning.  Patient appears to have E coli sepsis so will taper antibiotics.  Patient's echocardiogram showed no vegetation and E coli would be low risk for metastatic infection.  Will transfer the patient to the floor once her Precedex discontinue we counseled her again today on the importance of avoiding substances and illicit drugs.  This continues to be setback for her.  12/27/22 WC:  Patient overall remains stable and is slowly improving.  Urine culture has revealed Candida albicans in addition to blood culture revealing E coli.  12/28/22 WC:  Patient resting comfortably this morning.  Pharm ID on case for treatment duration recs.    12/29/22 WC:  no acute events overnight.  Back pain at this time.   12/20/22:  no acute events continue IV ABX for esbl  12/31/22 CG: stable, continuing antibiotics.   1/1/2 CG: stable, continuing antibiotics. Has not had a bowel movement in two weeks. Will give lactulose.    01/02/2023: Patient did have a bowel movement yesterday.  Completing last day of antimicrobial therapy anticipated discharge tomorrow.      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 OR [address] [place] [address];  Service: Neurosurgery;  Laterality: N/A;  [place]    SPINE SURGERY         Review of patient's allergies indicates:   Allergen Reactions    Bee venom protein (honey bee) Anaphylaxis     Patient reports she is allergic to bee stings.    Naproxen Anaphylaxis     Throat closing    12-23- Patient reports taking Ibuprofen 200 mg at home without problems. Verified X3. [place]    Wasp sting [allergen ext-venom-honey bee] Anaphylaxis    Adhesive Blisters    Iodine and iodide containing products Hives     Allergic to iodine in seafood only    Shellfish containing products     Nuts [tree nut] Rash       No current facility-administered medications on file prior to encounter.     Current Outpatient Medications on File Prior to Encounter   Medication Sig    acetaminophen (TYLENOL) 325 MG tablet Take 2 tablets (650 mg total) by mouth every 6 (six) hours as needed for Temperature greater than (100.4 F).    albuterol (ACCUNEB) 1.25 mg/3 mL Nebu Take 3 mLs (1.25 mg total) by nebulization every 6 (six) hours as needed (shortness of breath). Rescue    buPROPion (WELLBUTRIN) 100 MG tablet Take 1 tablet (100 mg total) by mouth 2 (two) times daily.    ciprofloxacin HCl (CIPRO) 500 MG tablet Take 500 mg by mouth 2 (two) times daily.    folic acid (FOLVITE) 1 MG tablet Take 1 tablet (1 mg total) by mouth once daily. (Patient not taking: Reported on 11/25/2022)    pantoprazole (PROTONIX) 40 MG tablet Take 1 tablet (40 mg total) by mouth once daily.    pregabalin (LYRICA) 75 MG capsule Take 1 capsule (75 mg total) by mouth 3 (three) times daily.    sulfamethoxazole-trimethoprim 800-160mg (BACTRIM DS) 800-160 mg Tab Take 2 tablets by mouth 2 (two) times daily.    [DISCONTINUED] diclofenac sodium (VOLTAREN) 1 % Gel Apply 2 g topically 4 (four) times daily.     Family History       Problem Relation (Age of Onset)    Cirrhosis Father    Drug abuse Mother, Father     Hepatitis Mother, Father    Liver cancer Mother          Tobacco Use    Smoking status: Some Days     Packs/day: 0.50     Years: 23.00     Pack years: 11.50     Types: Cigarettes    Smokeless tobacco: Never    Tobacco comments:     patient states she knows she nees to quit.   Substance and Sexual Activity    Alcohol use: Not Currently     Comment: quit 2014    Drug use: Yes     Frequency: 4.0 times per week     Types: Marijuana     Comment: Patient denies needle use, only inhalation of methampehtamines or orally.  Last known drug use was prior to admission to hospital stay for surgery    Sexual activity: Yes     Partners: Male     Birth control/protection: None     Review of Systems   Unable to perform ROS: Acuity of condition   Objective:     Vital Signs (Most Recent):  Temp: 99 °F (37.2 °C) (01/02/23 1605)  Pulse: 97 (01/02/23 1605)  Resp: 20 (01/02/23 1634)  BP: (!) 111/57 (01/02/23 1605)  SpO2: 100 % (01/02/23 1605)   Vital Signs (24h Range):  Temp:  [98 °F (36.7 °C)-99 °F (37.2 °C)] 99 °F (37.2 °C)  Pulse:  [88-98] 97  Resp:  [18-20] 20  SpO2:  [95 %-100 %] 100 %  BP: ()/(54-63) 111/57     Weight: 97.5 kg (215 lb)  Body mass index is 33.67 kg/m².    Physical Exam  Vitals and nursing note reviewed.   Constitutional:       General: She is not in acute distress.     Appearance: She is obese. She is ill-appearing.   HENT:      Head: Normocephalic and atraumatic.      Nose: Nose normal. No congestion or rhinorrhea.      Mouth/Throat:      Mouth: Mucous membranes are dry.      Pharynx: No oropharyngeal exudate.   Eyes:      General: No scleral icterus.  Neck:      Vascular: No carotid bruit.   Cardiovascular:      Rate and Rhythm: Regular rhythm. Tachycardia present.      Heart sounds: Murmur heard.     No friction rub. No gallop.   Pulmonary:      Effort: No respiratory distress.      Breath sounds: No stridor. No wheezing, rhonchi or rales.   Chest:      Chest wall: No tenderness.   Abdominal:       General: There is no distension.      Palpations: There is no mass.      Tenderness: There is no abdominal tenderness. There is no right CVA tenderness, left CVA tenderness, guarding or rebound.      Hernia: No hernia is present.   Musculoskeletal:         General: No swelling, tenderness or deformity.      Cervical back: Neck supple. No rigidity or tenderness.      Right lower leg: Edema present.      Left lower leg: Edema present.   Lymphadenopathy:      Cervical: No cervical adenopathy.   Skin:     Capillary Refill: Capillary refill takes less than 2 seconds.      Coloration: Skin is not jaundiced or pale.   Neurological:      Cranial Nerves: No cranial nerve deficit.      Sensory: Sensory deficit present.      Motor: Weakness present.   Psychiatric:      Comments: Calm, cooperative, moving all extremities.           Significant Labs: CBC:   Recent Labs   Lab 01/01/23  0538 01/02/23  0530   WBC 2.00* 2.76*   HGB 8.5* 8.8*   HCT 26.1* 27.5*   PLT 66* 71*       CMP:   Recent Labs   Lab 01/01/23  0538 01/02/23  0530    139   K 3.9 4.0    107   CO2 31* 30*   GLU 94 89   BUN 8 10   CREATININE 0.4* 0.4*   CALCIUM 7.8* 7.8*   PROT 6.5 6.8   ALBUMIN 1.7* 1.7*   BILITOT 0.9 1.2*   ALKPHOS 125 136*   AST 34 37   ALT 16 16   ANIONGAP 3* 2*       Urine Studies:   No results for input(s): COLORU, APPEARANCEUA, PHUR, SPECGRAV, PROTEINUA, GLUCUA, KETONESU, BILIRUBINUA, OCCULTUA, NITRITE, UROBILINOGEN, LEUKOCYTESUR, RBCUA, WBCUA, BACTERIA, SQUAMEPITHEL, HYALINECASTS in the last 48 hours.    Invalid input(s): WRIGHTSUR      Significant Imaging: I have reviewed all pertinent imaging results/findings within the past 24 hours.      Assessment/Plan:      * Severe sepsis  This patient does have evidence of infective focus  My overall impression is sepsis. Vital signs were reviewed and noted in progress note.  Antibiotics given-   Antibiotics (From admission, onward)    Start     Stop Route Frequency Ordered    01/02/23  1515  meropenem (MERREM) 1 g in sodium chloride 0.9 % 100 mL IVPB (MB+)        See Hyperspace for full Linked Orders Report.    01/03 0714 IV Every 8 hours (non-standard times) 01/02/23 0923    01/02/23 1515  meropenem (MERREM) 1 g in sodium chloride 0.9 % 100 mL IVPB (MB+)        See Hyperspace for full Linked Orders Report.    01/03 0714 IV Every 8 hours (non-standard times) 01/02/23 0923    12/23/22 0533  vancomycin (VANCOCIN) 1,000 mg injection        Note to Pharmacy: Created by cabinet override    12/23 1744   12/23/22 0533    12/23/22 0405  piperacillin-tazobactam (ZOSYN) 4.5 gram injection        Note to Pharmacy: Created by cabinet override    12/23 1614   12/23/22 0405        Cultures were taken-   Microbiology Results (last 7 days)     Procedure Component Value Units Date/Time    Blood culture [[id number]] Collected: 12/28/22 1058    Order Status: Completed Specimen: Blood Updated: 01/01/23 1812     Blood Culture, Routine No Growth to date      No Growth to date      No Growth to date      No Growth to date      No Growth to date    Blood culture [[id number]] Collected: 12/28/22 1058    Order Status: Completed Specimen: Blood Updated: 01/01/23 1812     Blood Culture, Routine No Growth to date      No Growth to date      No Growth to date      No Growth to date      No Growth to date    Blood culture #2 **CANNOT BE ORDERED STAT** [[id number]] Collected: 12/23/22 0257    Order Status: Completed Specimen: Blood Updated: 12/28/22 2022     Blood Culture, Routine No growth after 5 days.        Latest lactate reviewed, they are-  No results for input(s): LACTATE in the last 72 hours.    Organ dysfunction indicated by Acute kidney injury, Encephalopathy  and Acute liver injury  Source- ?    Source control Achieved by- see orders    Merrem day 7 of 7      Pulmonary hypertension  Found incidentally on echocardiogram with evidence of elevated right sided pressures and dilated heart chambers on the right side. Unclear  the exact etiology or class of Pulm HTN but given her significant substance abuse Class 1 certainly is a possibility.   - Will refer to outpatient pulmonologist.   - Will consider a cariology consult while inpatient to see if they think it would be beneficial to have a heart cath done while inpatient      Murmur, cardiac  No evidence of vegetations    Mild episode of recurrent major depressive disorder  Patient with altered mental status holding antidepressants for now      Cannabis use disorder, moderate, dependence  As above      Amphetamine use disorder, severe  Continue to  educate and support      Spinal stenosis at L4-L5 level        Substance use disorder  Cessation advised.      Chronic hepatitis C with cirrhosis  Labs noted      Cirrhosis of liver without ascites  Labs noted    AST   Date Value Ref Range Status   01/02/2023 37 10 - 40 U/L Final   01/01/2023 34 10 - 40 U/L Final   12/31/2022 34 10 - 40 U/L Final   12/30/2022 35 10 - 40 U/L Final   12/29/2022 33 10 - 40 U/L Final     ALT   Date Value Ref Range Status   01/02/2023 16 10 - 44 U/L Final   01/01/2023 16 10 - 44 U/L Final   12/31/2022 16 10 - 44 U/L Final   12/30/2022 16 10 - 44 U/L Final   12/29/2022 16 10 - 44 U/L Final        Alkaline Phosphatase   Date Value Ref Range Status   01/02/2023 136 (H) 55 - 135 U/L Final   01/01/2023 125 55 - 135 U/L Final   12/31/2022 126 55 - 135 U/L Final   12/30/2022 117 55 - 135 U/L Final   12/29/2022 116 55 - 135 U/L Final            UTI (urinary tract infection)  On abx, ? Source.  Suspect source of sepsis.    Treat candida x 2 weeks given hx.      VTE Risk Mitigation (From admission, onward)         Ordered     IP VTE HIGH RISK PATIENT  Once         12/23/22 0647     Place sequential compression device  Until discontinued         12/23/22 0647                Discharge Planning   SHIRA:      Code Status: Full Code   Is the patient medically ready for discharge?:     Reason for patient still in hospital  (select all that apply): Treatment  Discharge Plan A: Home with family                  Hal Barger Jr, MD  Department of Hospital Medicine   [facility]

## 2023-01-03 LAB
ALBUMIN SERPL BCP-MCNC: 1.8 G/DL (ref 3.5–5.2)
ALP SERPL-CCNC: 130 U/L (ref 55–135)
ALT SERPL W/O P-5'-P-CCNC: 16 U/L (ref 10–44)
ANION GAP SERPL CALC-SCNC: 4 MMOL/L (ref 8–16)
AST SERPL-CCNC: 37 U/L (ref 10–40)
BASOPHILS # BLD AUTO: 0.02 K/UL (ref 0–0.2)
BASOPHILS NFR BLD: 0.7 % (ref 0–1.9)
BILIRUB SERPL-MCNC: 1.5 MG/DL (ref 0.1–1)
BUN SERPL-MCNC: 10 MG/DL (ref 6–20)
CALCIUM SERPL-MCNC: 7.8 MG/DL (ref 8.7–10.5)
CHLORIDE SERPL-SCNC: 107 MMOL/L (ref 95–110)
CO2 SERPL-SCNC: 28 MMOL/L (ref 23–29)
CREAT SERPL-MCNC: 0.4 MG/DL (ref 0.5–1.4)
DIFFERENTIAL METHOD: ABNORMAL
EOSINOPHIL # BLD AUTO: 0.1 K/UL (ref 0–0.5)
EOSINOPHIL NFR BLD: 4.9 % (ref 0–8)
ERYTHROCYTE [DISTWIDTH] IN BLOOD BY AUTOMATED COUNT: 17.3 % (ref 11.5–14.5)
EST. GFR  (NO RACE VARIABLE): >60 ML/MIN/1.73 M^2
GLUCOSE SERPL-MCNC: 83 MG/DL (ref 70–110)
HCT VFR BLD AUTO: 27.8 % (ref 37–48.5)
HGB BLD-MCNC: 8.9 G/DL (ref 12–16)
IMM GRANULOCYTES # BLD AUTO: 0.01 K/UL (ref 0–0.04)
IMM GRANULOCYTES NFR BLD AUTO: 0.4 % (ref 0–0.5)
LYMPHOCYTES # BLD AUTO: 0.5 K/UL (ref 1–4.8)
LYMPHOCYTES NFR BLD: 18 % (ref 18–48)
MAGNESIUM SERPL-MCNC: 1.8 MG/DL (ref 1.6–2.6)
MCH RBC QN AUTO: 27.9 PG (ref 27–31)
MCHC RBC AUTO-ENTMCNC: 32 G/DL (ref 32–36)
MCV RBC AUTO: 87 FL (ref 82–98)
MONOCYTES # BLD AUTO: 0.2 K/UL (ref 0.3–1)
MONOCYTES NFR BLD: 7.4 % (ref 4–15)
NEUTROPHILS # BLD AUTO: 2 K/UL (ref 1.8–7.7)
NEUTROPHILS NFR BLD: 68.6 % (ref 38–73)
NRBC BLD-RTO: 0 /100 WBC
PLATELET # BLD AUTO: 70 K/UL (ref 150–450)
PMV BLD AUTO: 8.9 FL (ref 9.2–12.9)
POTASSIUM SERPL-SCNC: 3.8 MMOL/L (ref 3.5–5.1)
PROT SERPL-MCNC: 6.8 G/DL (ref 6–8.4)
RBC # BLD AUTO: 3.19 M/UL (ref 4–5.4)
SODIUM SERPL-SCNC: 139 MMOL/L (ref 136–145)
WBC # BLD AUTO: 2.84 K/UL (ref 3.9–12.7)

## 2023-01-03 PROCEDURE — 25000003 PHARM REV CODE 250: Performed by: PSYCHIATRY & NEUROLOGY

## 2023-01-03 PROCEDURE — 25000003 PHARM REV CODE 250: Performed by: INTERNAL MEDICINE

## 2023-01-03 PROCEDURE — 85025 COMPLETE CBC W/AUTO DIFF WBC: CPT | Performed by: INTERNAL MEDICINE

## 2023-01-03 PROCEDURE — 36415 COLL VENOUS BLD VENIPUNCTURE: CPT | Performed by: INTERNAL MEDICINE

## 2023-01-03 PROCEDURE — A4216 STERILE WATER/SALINE, 10 ML: HCPCS | Performed by: INTERNAL MEDICINE

## 2023-01-03 PROCEDURE — 80053 COMPREHEN METABOLIC PANEL: CPT | Performed by: INTERNAL MEDICINE

## 2023-01-03 PROCEDURE — 99223 1ST HOSP IP/OBS HIGH 75: CPT | Mod: SA,HB,, | Performed by: PSYCHIATRY & NEUROLOGY

## 2023-01-03 PROCEDURE — 27000207 HC ISOLATION

## 2023-01-03 PROCEDURE — 99233 SBSQ HOSP IP/OBS HIGH 50: CPT | Mod: ,,, | Performed by: STUDENT IN AN ORGANIZED HEALTH CARE EDUCATION/TRAINING PROGRAM

## 2023-01-03 PROCEDURE — 11000001 HC ACUTE MED/SURG PRIVATE ROOM

## 2023-01-03 PROCEDURE — 97535 SELF CARE MNGMENT TRAINING: CPT | Mod: CO

## 2023-01-03 PROCEDURE — 90833 PSYTX W PT W E/M 30 MIN: CPT | Mod: SA,HB,, | Performed by: PSYCHIATRY & NEUROLOGY

## 2023-01-03 PROCEDURE — 99233 PR SUBSEQUENT HOSPITAL CARE,LEVL III: ICD-10-PCS | Mod: ,,, | Performed by: STUDENT IN AN ORGANIZED HEALTH CARE EDUCATION/TRAINING PROGRAM

## 2023-01-03 PROCEDURE — 83735 ASSAY OF MAGNESIUM: CPT | Performed by: INTERNAL MEDICINE

## 2023-01-03 PROCEDURE — 90833 PR PSYCHOTHERAPY W/PATIENT W/E&M, 30 MIN (ADD ON): ICD-10-PCS | Mod: SA,HB,, | Performed by: PSYCHIATRY & NEUROLOGY

## 2023-01-03 PROCEDURE — 97530 THERAPEUTIC ACTIVITIES: CPT

## 2023-01-03 PROCEDURE — 99223 PR INITIAL HOSPITAL CARE,LEVL III: ICD-10-PCS | Mod: SA,HB,, | Performed by: PSYCHIATRY & NEUROLOGY

## 2023-01-03 PROCEDURE — 63600175 PHARM REV CODE 636 W HCPCS: Performed by: INTERNAL MEDICINE

## 2023-01-03 RX ORDER — GABAPENTIN 300 MG/1
300 CAPSULE ORAL 3 TIMES DAILY
Status: DISCONTINUED | OUTPATIENT
Start: 2023-01-03 | End: 2023-01-12

## 2023-01-03 RX ORDER — BUPROPION HYDROCHLORIDE 100 MG/1
100 TABLET ORAL 2 TIMES DAILY
Status: DISCONTINUED | OUTPATIENT
Start: 2023-01-03 | End: 2023-01-12

## 2023-01-03 RX ADMIN — METHOCARBAMOL TABLETS 500 MG: 500 TABLET, COATED ORAL at 08:01

## 2023-01-03 RX ADMIN — BUPROPION HYDROCHLORIDE 100 MG: 100 TABLET, FILM COATED ORAL at 08:01

## 2023-01-03 RX ADMIN — Medication 10 ML: at 12:01

## 2023-01-03 RX ADMIN — MORPHINE SULFATE 2 MG: 2 INJECTION, SOLUTION INTRAMUSCULAR; INTRAVENOUS at 03:01

## 2023-01-03 RX ADMIN — GABAPENTIN 300 MG: 300 CAPSULE ORAL at 08:01

## 2023-01-03 RX ADMIN — POLYETHYLENE GLYCOL (3350) 17 G: 17 POWDER, FOR SOLUTION ORAL at 09:01

## 2023-01-03 RX ADMIN — METHOCARBAMOL TABLETS 500 MG: 500 TABLET, COATED ORAL at 09:01

## 2023-01-03 RX ADMIN — HYDROCODONE BITARTRATE AND ACETAMINOPHEN 1 TABLET: 5; 325 TABLET ORAL at 12:01

## 2023-01-03 RX ADMIN — GABAPENTIN 300 MG: 300 CAPSULE ORAL at 03:01

## 2023-01-03 RX ADMIN — Medication 10 ML: at 05:01

## 2023-01-03 RX ADMIN — METHOCARBAMOL TABLETS 500 MG: 500 TABLET, COATED ORAL at 05:01

## 2023-01-03 RX ADMIN — MORPHINE SULFATE 2 MG: 2 INJECTION, SOLUTION INTRAMUSCULAR; INTRAVENOUS at 10:01

## 2023-01-03 RX ADMIN — PANTOPRAZOLE SODIUM 40 MG: 40 TABLET, DELAYED RELEASE ORAL at 09:01

## 2023-01-03 RX ADMIN — Medication 10 ML: at 06:01

## 2023-01-03 RX ADMIN — METHOCARBAMOL TABLETS 500 MG: 500 TABLET, COATED ORAL at 12:01

## 2023-01-03 RX ADMIN — PREGABALIN 75 MG: 75 CAPSULE ORAL at 09:01

## 2023-01-03 NOTE — SUBJECTIVE & OBJECTIVE
Interval History: Patient seen and examined.  No acute events overnight.     Review of Systems   Constitutional:  Positive for activity change. Negative for appetite change, chills, diaphoresis, fatigue and fever.   HENT:  Negative for sore throat.    Eyes:  Negative for pain.   Respiratory:  Negative for cough, choking, chest tightness and shortness of breath.    Cardiovascular:  Negative for chest pain, palpitations and leg swelling.   Gastrointestinal:  Negative for abdominal pain, constipation, diarrhea, nausea, rectal pain and vomiting.   Genitourinary:  Negative for dyspareunia, dysuria, flank pain, frequency, genital sores, hematuria and menstrual problem.   Musculoskeletal:  Positive for arthralgias, back pain, gait problem, myalgias and neck pain.   Skin:  Negative for pallor, rash and wound.   Neurological:  Positive for weakness. Negative for dizziness, tremors, speech difficulty and headaches.   Psychiatric/Behavioral:  Negative for agitation, behavioral problems, confusion, decreased concentration and dysphoric mood.    Objective:     Vital Signs (Most Recent):  Temp: 98.2 °F (36.8 °C) (01/03/23 0717)  Pulse: 94 (01/03/23 0740)  Resp: 20 (01/03/23 0717)  BP: (!) 112/56 (01/03/23 0717)  SpO2: 100 % (01/03/23 0717)   Vital Signs (24h Range):  Temp:  [97.6 °F (36.4 °C)-99 °F (37.2 °C)] 98.2 °F (36.8 °C)  Pulse:  [88-98] 94  Resp:  [16-20] 20  SpO2:  [96 %-100 %] 100 %  BP: (104-122)/(55-67) 112/56     Weight: 97.5 kg (215 lb)  Body mass index is 33.67 kg/m².    Intake/Output Summary (Last 24 hours) at 1/3/2023 0801  Last data filed at 1/3/2023 0659  Gross per 24 hour   Intake 250 ml   Output 600 ml   Net -350 ml      Physical Exam  Vitals and nursing note reviewed.   Constitutional:       General: She is not in acute distress.     Appearance: She is obese. She is ill-appearing.   HENT:      Head: Normocephalic and atraumatic.      Right Ear: External ear normal.      Left Ear: External ear normal.       Nose: Nose normal. No congestion or rhinorrhea.      Mouth/Throat:      Mouth: Mucous membranes are dry.      Pharynx: No oropharyngeal exudate.   Eyes:      General: No scleral icterus.     Extraocular Movements: Extraocular movements intact.      Conjunctiva/sclera: Conjunctivae normal.   Neck:      Vascular: No carotid bruit.   Cardiovascular:      Rate and Rhythm: Regular rhythm. Tachycardia present.      Heart sounds: Murmur heard.     No friction rub. No gallop.   Pulmonary:      Effort: No respiratory distress.      Breath sounds: No stridor. No wheezing, rhonchi or rales.   Chest:      Chest wall: No tenderness.   Abdominal:      General: There is no distension.      Palpations: Abdomen is soft. There is no mass.      Tenderness: There is no abdominal tenderness. There is no right CVA tenderness, left CVA tenderness or guarding.   Musculoskeletal:         General: No swelling, tenderness or deformity.      Cervical back: Neck supple. No rigidity or tenderness.      Right lower leg: Edema present.      Left lower leg: Edema present.   Lymphadenopathy:      Cervical: No cervical adenopathy.   Skin:     Capillary Refill: Capillary refill takes less than 2 seconds.      Coloration: Skin is not jaundiced or pale.   Neurological:      Mental Status: She is oriented to person, place, and time. Mental status is at baseline.      Cranial Nerves: No cranial nerve deficit.      Sensory: Sensory deficit present.      Motor: Weakness present.   Psychiatric:         Mood and Affect: Mood normal.         Behavior: Behavior normal.      Comments: Calm, cooperative, moving all extremities.     Significant Labs: All pertinent labs within the past 24 hours have been reviewed.  BMP:   Recent Labs   Lab 01/03/23 0512   GLU 83      K 3.8      CO2 28   BUN 10   CREATININE 0.4*   CALCIUM 7.8*   MG 1.8     CBC:   Recent Labs   Lab 01/02/23  0530 01/03/23  0512   WBC 2.76* 2.84*   HGB 8.8* 8.9*   HCT 27.5* 27.8*   PLT  71* 70*     CMP:   Recent Labs   Lab 01/02/23  0530 01/03/23  0512    139   K 4.0 3.8    107   CO2 30* 28   GLU 89 83   BUN 10 10   CREATININE 0.4* 0.4*   CALCIUM 7.8* 7.8*   PROT 6.8 6.8   ALBUMIN 1.7* 1.8*   BILITOT 1.2* 1.5*   ALKPHOS 136* 130   AST 37 37   ALT 16 16   ANIONGAP 2* 4*     Significant Imaging: I have reviewed all pertinent imaging results/findings within the past 24 hours.

## 2023-01-03 NOTE — PLAN OF CARE
Problem: Physical Therapy  Goal: Physical Therapy Goal  Description: Goals to be met by: 2023     Patient will increase functional independence with mobility by performin. Supine to sit with Modified [place]  2. Sit to supine with Modified [place]  3. Sit to stand transfer with Stand-by Assistance  4. Bed to chair transfer with Stand-by Assistance using Rolling Walker  5. Gait  x 50 feet with Contact Guard Assistance using Rolling Walker.   6. Sitting at edge of bed x10 minutes with [place] to perform (B) LE therex      Outcome: Ongoing, Progressing

## 2023-01-03 NOTE — PT/OT/SLP PROGRESS
Occupational Therapy   Treatment    Name: Rachel Zazueta  MRN: [id number]  Admitting Diagnosis:  Severe sepsis       Recommendations:     Discharge Recommendations: other (see comments) (TBD)  Discharge Equipment Recommendations:  other (see comments) (TBD)  Barriers to discharge:  Other (Comment) (medical and fuctional status)    Assessment:     Rachel Zazueta is a 42 y.o. female with a medical diagnosis of Severe sepsis.  She presents with good participation. Performance deficits affecting function are weakness, impaired endurance, impaired self care skills, impaired functional mobility, gait instability, decreased upper extremity function, decreased lower extremity function, decreased safety awareness, pain.     Rehab Prognosis:  Good; patient would benefit from acute skilled OT services to address these deficits and reach maximum level of function.       Plan:     Patient to be seen 5 x/week to address the above listed problems via self-care/home management, therapeutic activities, therapeutic exercises  Plan of Care Expires: 01/02/23  Plan of Care Reviewed with: patient    Subjective     Pain/Comfort:  Pain Rating 1: 8/10  Location - Side 1: Bilateral  Location - Orientation 1: lower  Location 1: back  Pain Addressed 1: Reposition, Cessation of Activity, Nurse notified  Pain Rating Post-Intervention 1: 8/10    Objective:     Communicated with: OT, PT and RN prior to session.  Patient found HOB elevated with PICC line, PureWick, telemetry upon OT entry to room.    General Precautions: Standard, fall    Orthopedic Precautions:N/A  Braces: N/A  Respiratory Status: Room air     Occupational Performance:     Activities of Daily Living:  Grooming: modified independence post set-up      [facility] 6 Click ADL: 16    Treatment & Education:  Pt agreeable to participate in therapy session. Pt found supine in bed with HOB elevated with PT discussing POC. Pt completed ADL grooming task such as brushing hair and brushing  teeth with mod I post set up sitting with HOB elevated. Pt also completed therapeutic exercises for 1 x 10 reps with minimal rest breaks in the following planes: shoulder flex/extension, shoulder abduction, elbow flex/extension, and composite fist. Pt complained of no SOB or increase in pain during therapy session. Post session pt asked COTTON to notify RN she wanted/time for pain meds. Pt tolerated therapy session well.     Patient left HOB elevated with all lines intact and call button in reach    GOALS:   Multidisciplinary Problems       Occupational Therapy Goals          Problem: Occupational Therapy    Goal Priority Disciplines Outcome Interventions   Occupational Therapy Goal     OT, PT/OT Ongoing, Progressing    Description: Goals to be met by: 01/02/22     Patient will increase functional independence with ADLs by performing:    UE Dressing with Set-up Assistance.  LE Dressing with Minimal Assistance.  Grooming while seated with Modified [place].  Toileting from toilet with Supervision for hygiene and clothing management.   Bathing from  shower chair/bench with Minimal Assistance.  Step transfer with Minimal Assistance  Toilet transfer to toilet with Minimal Assistance.  Increased functional strength to 4/5 for bilateral UE's.                         Time Tracking:     OT Date of Treatment: 01/03/23  OT Start Time: 1529  OT Stop Time: 1544  OT Total Time (min): 15 min    Billable Minutes:Self Care/Home Management 15 min    OT/ROSS: ROSS     ROSS Visit Number: 2    1/3/2023

## 2023-01-03 NOTE — PLAN OF CARE
Problem: Occupational Therapy  Goal: Occupational Therapy Goal  Description: Goals to be met by: 01/02/22     Patient will increase functional independence with ADLs by performing:    UE Dressing with Set-up Assistance.  LE Dressing with Minimal Assistance.  Grooming while seated with Modified [place].  Toileting from toilet with Supervision for hygiene and clothing management.   Bathing from  shower chair/bench with Minimal Assistance.  Step transfer with Minimal Assistance  Toilet transfer to toilet with Minimal Assistance.  Increased functional strength to 4/5 for bilateral UE's.    Outcome: Ongoing, Progressing   Continue with POC.

## 2023-01-03 NOTE — CONSULTS
PSYCHIATRY CONSULT      1/3/2023 11:48 AM   Rachel Zazueta   1980   [id number]         DATE OF ADMISSION: 12/23/2022  2:14 AM    SITE: [facility]    CURRENT LEGAL STATUS: Voluntary medical admission      HISTORY    CHIEF COMPLAINT   Rachel Zazueta is a 42 y.o. female with a past psychiatric history of Major Depression, Rec and Substance Abuse currently admitted to the inpatient unit with the following chief complaint: weakness    HPI   The patient was seen and examined. The chart was reviewed.    The patient presented to the ER on 12/23/2022 .    Per ED/Hospital medicine:  HPI:  ER HPI:  42-year-old female with a history of anemia, anxiety, recurrent cellulitis, hepatitis C, IVDU, liver cirrhosis, cholecystectomy, kidney stones, lumbar fusion, shoulder surgery, chronic back pain comes in c/o generalized weakness, fatigue, and bilateral flank pain.  She reports that this has been going on intermittently for several days and was diagnosed with a UTI recently.  No fever.  No abdominal pain or vomiting or diarrhea.      IM HPI:  Patient is well known to our service.  Patient has a history of IV drug abuse and unfortunately has relapsed.  Patient has a history of a epidural abscess requiring a large lumbar surgery with multiple rehabilitative stays and therapy.  The patient was progressing to ambulation and recently has declined.  Patient admits to starting drugs including IV drug use again.  Patient presented to the ED with the above symptoms and she has a noticeable cardiac murmur today that we are getting a stat echocardiogram.  Patient's been started on antibiotics fluid resuscitated and seems to have sepsis.  Patient's urinary studies were abnormal but not overwhelming.      Per initial psychiatric assessment:    Psychiatry consulted d/t hx of depression and hx of substance abuse.    Patient seen via telehealth.     On arrival, patient is laying in bed, aaox4, calm, pleasant. States that reason for   "hospitalization is "Sepsis."    Pt reports history of depression, with symptoms ongoing for the past 6 months or so. Symptoms include low mood, decreased energy, difficulty with sleep maintenance, worthlessness. She states that several factors contribute to her depression including loss of mobility due to chronic medical illness as well as deaths of family members over the years. She states "I'm the only one left in my family, everyone else has ." She denies any present or historical suicidal ideation, denies suicide attempts in the past. Denies any history of psychiatric hospitalization.     Patient reports history of drug use, most prominently cannabis and methamphetamine. She reports intermittent periods of sobriety, however states she has relapsed recently and currently smokes meth 2x/week for the last month or so (internal medicine notes state that patient has been using IV drugs which she denies during today's assessment.) She reports that she only uses methamphetamine "when someone brings it around" adding "I don't go looking for it. I know I need to stop." She denies cravings. She does not feel she needs rehab at this time. Reports daily cannabis use including smoking and ingesting gummies. She reports this is somewhat effective in managing pain.     Patient is prescribed suboxone 8:2mg BID. She states that she takes this for pain and denies any history of opioid misuse, however she also states that "One doctor said I was abusing pain pills so now that's on my chart." She states that she finds it somewhat effective in managing pain.     Denies any recent psychosis/AV hallucinations. She reports "When I first started using meth years ago I would hallucinate, but not anymore."    Pt receiving alprazolam in the hospital-this is a new medication. She reports that she does not normally experience anxiety but that she has several times during hospitalization. States the medication is effective.     Additional " data noted below.     Symptoms of Depression: diminished mood - Yes, loss of interest/anhedonia - Yes;  recurrent - Yes, >14 days - Yes, diminished energy - Yes, change in sleep - Yes, change in appetite - No, diminished concentration or cognition or indecisiveness - No, PMA/R -  Yes, excessive guilt or hopelessness or worthlessness - Yes, suicidal ideations - No    Changes in Sleep: trouble with initiation- No, maintenance, - Yes early morning awakening with inability to return to sleep - No, hypersomnolence - No    Suicidal- active/passive ideations - No, organized plans, future intentions - No    Homicidal ideations: active/passive ideations - No, organized plans, future intentions - No    Symptoms of psychosis: hallucinations - No, delusions - No, disorganized speech - No, disorganized behavior or abnormal motor behavior - No, or negative symptoms (diminshed emotional expression, avolition, anhedonia, alogia, asociality) - No, active phase symptoms >1 month - No, continuous signs of illness > 6 months - No, since onset of illness decreased level of functioning present - No    Symptoms of mel or hypomania: elevated, expansive, or irritable mood with increased energy or activity - No; > 4 days - No,  >7 days - No; with inflated self-esteem or grandiosity - No, decreased need for sleep - No, increased rate of speech - No, FOI or racing thoughts - No, distractibility - No, increased goal directed activity or PMA - No, risky/disinhibited behavior - No    Symptoms of WOLF: excessive anxiety/worry/fear, more days than not, about numerous issues - Yes, ongoing for >6 months - No, difficult to control - No, with restlessness - No, fatigue - No, poor concentration - No, irritability - No, muscle tension - No, sleep disturbance - No; causes functionally impairing distress - No    Symptoms of Panic Disorder: recurrent panic attacks (palpitations/heart racing, sweating, shakiness, dyspnea, choking, chest pain/discomfort, Gi  "symptoms, dizzy/lightheadedness, hot/col flashes, paresthesias, derealization, fear of losing control or fear of dying or fear of "going crazy") - No, precipitated - No, un-precipitated - No, source of worry and/or behavioral changes secondary for 1 month or longer- No, agoraphobia - No    Symptoms of PTSD: h/o trauma exposure - No; re-experiencing/intrusive symptoms - No, avoidant behavior - No, 2 or more negative alterations in cognition or mood - No, 2 or more hyperarousal symptoms - No; with dissociative symptoms - No, ongoing for 1 or more  months - No    Symptoms of OCD: obsessions (recurrent thoughts/urges/images; intrusive and/or unwanted; uses other thoughts/actions to suppress) - No; compulsions (repetitive behaviors used to lower distress/anxiety/obsessions) - No, time-consuming (over 1 hour per day) or cause significant distress/impairment - - No    Symptoms of Anorexia: restriction of caloric intake leading to significantly low body weight - No, intense fear of gaining weight or persistent behavior that interferes with weight gain even thought at a significantly low weight - No, disturbance in the way in which one's body weight or shape is experienced, undue influence of body weight or shape on self evaluation, or persistent lack of recognition of the seriousness of the current low body weight - No    Symptoms of Bulimia: recurrent episodes of binge eating (definitely larger amount  than what others would eat and lack of a sense of control over eating during episode) - No, recurrent inappropriate compensatory behaviors in order to prevent weight gain (fasting, medications, exercise, vomiting) - No, binges and compensatory behaviors both occur on average at least once a week for 3 months - No, self evaluations is unduly influenced by body shape/weight- - No    Symptoms of Binge eating: recurrent episodes of binge eating (definitely larger amount than what others would eat and lack of a sense of control " "over eating during episode) - No, 3 or more of following (eating much more rapidly, eating until uncomfortably full, large amounts when not hungry, eating alone because of embarrassed by how much,  feeling disgusted with oneself, depressed or very guilty afterward) - No, distress regarding binges - No, binges occur on average at least once a week for 3 months - No      Substance/s:  Taken in larger amounts or over longer periods than intended: Yes,  Persistent desire or unsuccessful attempts to cut down or stop: Yes,  Great deal of time spent seeking, using or recovering from: No,  Craving or strong desire to use: "Not recently",  Recurrent use despite failure to meet major role obligation: No,  Continued use despite persistent or recurrent social/interparsonal issues due to use: Yes,  Important social/work/recreational activities given up due to use: No,  Recurrent use in physically hazardous situations: No,  Continued use despite knowledge of persistent physical or psychological problem: Yes,  Tolerance (either increased need or diminished effect): Yes,      Psychotherapy:  Target symptoms: depression, substance abuse  Why chosen therapy is appropriate versus another modality: relevant to diagnosis, evidence based practice  Outcome monitoring methods: self-report, observation  Therapeutic intervention type: insight oriented psychotherapy, supportive psychotherapy  Topics discussed/themes: stress related to medical comorbidities, building skills sets for symptom management, substance abuse  The patient's response to the intervention is accepting. The patient's progress toward treatment goals is fair.   Duration of intervention: 16 minutes.      PAST PSYCHIATRIC HISTORY  Previous Psychiatric Hospitalizations: No  Previous SI/HI: No,  Previous Suicide Attempts: No,   Previous Medication Trials: Yes,Trials of zoloft (dose unknown) and wellbutrin 100 mg BID  Psychiatric Care (current & past): Yes,  History of " "Psychotherapy: No,  History of Violence: No,  History of sexual/physical abuse: No,    PAST MEDICAL & SURGICAL HISTORY   Past Medical History:   Diagnosis Date    Anemia     Anxiety     Depressed     Diskitis     Hep C w/o coma, chronic     IV drug abuse     Kidney stone     Liver cirrhosis      Past Surgical History:   Procedure Laterality Date    ARTHROTOMY OF SHOULDER Left 11/15/2022    Procedure: ARTHROTOMY, SHOULDER;  Surgeon: Bjorn Hayes MD;  Location: [facility];  Service: Orthopedics;  Laterality: Left;  Left acromioclavicular joint arthrotomy    BACK SURGERY      benine tumor removal      forehead, age 9    CHOLECYSTECTOMY      KIDNEY SURGERY      LUMBAR FUSION Bilateral 3/2/2022    Procedure: FUSION, SPINE, LUMBAR;  Surgeon: Guille Templeton MD;  Location: [facility] OR [address] [place] [address];  Service: Neurosurgery;  Laterality: Bilateral;  AIRO  T10--Pelvis    REMOVAL OF HARDWARE FROM SPINE N/A 2/21/2022    Procedure: REMOVAL, HARDWARE, SPINE;  Surgeon: Guille Templeton MD;  Location: [facility] OR [address] [place] [address];  Service: Neurosurgery;  Laterality: N/A;  [place]    SPINE SURGERY           CURRENT PSYCH MEDICATION REGIMEN   Wellbutrin 100 mg BID  Suboxone 8:2 mg BID  Alprazolam 0.5 mg TID (hospital med)    Current Medication side effects:  No  Current Medication compliance:  Yes    Previous psych meds trials  Sertraline "before the wellbutrin", dose unknown. States medication made depression worse.     Home Meds:   Prior to Admission medications    Medication Sig Start Date End Date Taking? Authorizing Provider   acetaminophen (TYLENOL) 325 MG tablet Take 2 tablets (650 mg total) by mouth every 6 (six) hours as needed for Temperature greater than (100.4 F). 3/22/22   Sai Red III, MD   albuterol (ACCUNEB) 1.25 mg/3 mL Nebu Take 3 mLs (1.25 mg total) by nebulization every 6 (six) hours as needed (shortness of breath). Rescue 12/10/22 12/10/23  Farzana Elizabeth NP   buPROPion (WELLBUTRIN) 100 MG tablet Take 1 tablet (100 mg total) " by mouth 2 (two) times daily. 11/22/22 11/22/23  Jori Johnson, HENRI   ciprofloxacin HCl (CIPRO) 500 MG tablet Take 500 mg by mouth 2 (two) times daily. 12/20/22   Historical Provider   folic acid (FOLVITE) 1 MG tablet Take 1 tablet (1 mg total) by mouth once daily.  Patient not taking: Reported on 11/25/2022 11/22/22 1/3/23  Minnie Cortez MD   pantoprazole (PROTONIX) 40 MG tablet Take 1 tablet (40 mg total) by mouth once daily. 3/23/22 11/25/22  Sai Red III, MD   pregabalin (LYRICA) 75 MG capsule Take 1 capsule (75 mg total) by mouth 3 (three) times daily. 9/28/22 11/25/22  Janet Morales NP   sulfamethoxazole-trimethoprim 800-160mg (BACTRIM DS) 800-160 mg Tab Take 2 tablets by mouth 2 (two) times daily. 12/9/22 1/20/23  Margareth Doe DO   diclofenac sodium (VOLTAREN) 1 % Gel Apply 2 g topically 4 (four) times daily. 12/31/21 3/11/22  Femi Galicia NP         OTC Meds:      Scheduled Meds:    lactulose  20 g Oral TID    methocarbamoL  500 mg Oral QID    pantoprazole  40 mg Oral Daily    polyethylene glycol  17 g Oral Daily    pregabalin  75 mg Oral BID    sodium chloride 0.9%  10 mL Intravenous Q6H      PRN Meds: ALPRAZolam, HYDROcodone-acetaminophen, ibuprofen, magnesium hydroxide 400 mg/5 ml, melatonin, morphine, ondansetron, promethazine, sodium chloride 0.9%, Flushing PICC Protocol **AND** sodium chloride 0.9% **AND** sodium chloride 0.9%   Psychotherapeutics (From admission, onward)      Start     Stop Route Frequency Ordered    12/28/22 1624  ALPRAZolam tablet 0.5 mg         -- Oral 3 times daily PRN 12/28/22 1524            ALLERGIES   Review of patient's allergies indicates:   Allergen Reactions    Bee venom protein (honey bee) Anaphylaxis     Patient reports she is allergic to bee stings.    Naproxen Anaphylaxis     Throat closing    12-23- Patient reports taking Ibuprofen 200 mg at home without problems. Verified X3. [place]    Wasp sting [allergen ext-venom-honey bee]  "Anaphylaxis    Adhesive Blisters    Iodine and iodide containing products Hives     Allergic to iodine in seafood only    Shellfish containing products     Nuts [tree nut] Rash       NEUROLOGIC HISTORY  Seizures: No  Head trauma: No    SOCIAL HISTORY:  Developmental/Childhood:Achieved all developmental milestones timely  Education:Associate's Degree  Employment Status/Finances:Disabled   Relationship Status/Sexual Orientation: in a committed relationship  Children: 0  Housing Status:  [facility]     history:  NO   Access to Firearms: NO ;  Locked up? n/a  Adventism:Non-practicing  Recreational activities:"I can't really do much with my back problems"    SUBSTANCE ABUSE HISTORY   Recreational Drugs: methamphetamines   Use of Alcohol: denied  Rehab History:no   Tobacco Use:yes    LEGAL HISTORY:   Past charges/incarcerations: NO  Pending charges:NO    FAMILY PSYCHIATRIC HISTORY   Family History   Problem Relation Age of Onset    Hepatitis Mother     Drug abuse Mother     Liver cancer Mother     Drug abuse Father     Cirrhosis Father     Hepatitis Father               ROS  Review of Systems   Constitutional:  Positive for malaise/fatigue.   HENT: Negative.     Eyes: Negative.    Respiratory: Negative.     Cardiovascular: Negative.    Gastrointestinal: Negative.    Genitourinary: Negative.    Musculoskeletal:  Positive for back pain.   Skin: Negative.    Neurological:  Positive for tingling.   Endo/Heme/Allergies: Negative.    Psychiatric/Behavioral:  Positive for depression and substance abuse. The patient is nervous/anxious.        EXAMINATION    PHYSICAL EXAM  Reviewed note/exam by Dr. Merino from [facility] medicine     VITALS   Vitals:    01/03/23 1128   BP: (!) 112/54   Pulse: 95   Resp: 20   Temp: 98.2 °F (36.8 °C)        Body mass index is 33.67 kg/m².        PAIN  7/10  Subjective report of pain matches objective signs and symptoms: No    LABORATORY DATA   Recent Results (from the past 72 hour(s))   CBC " Auto Differential    Collection Time: 01/01/23  5:38 AM   Result Value Ref Range    WBC 2.00 (L) 3.90 - 12.70 K/uL    RBC 2.98 (L) 4.00 - 5.40 M/uL    Hemoglobin 8.5 (L) 12.0 - 16.0 g/dL    Hematocrit 26.1 (L) 37.0 - 48.5 %    MCV 88 82 - 98 fL    MCH 28.5 27.0 - 31.0 pg    MCHC 32.6 32.0 - 36.0 g/dL    RDW 17.4 (H) 11.5 - 14.5 %    Platelets 66 (L) 150 - 450 K/uL    MPV 9.2 9.2 - 12.9 fL    Immature Granulocytes 0.5 0.0 - 0.5 %    Gran # (ANC) 1.3 (L) 1.8 - 7.7 K/uL    Immature Grans (Abs) 0.01 0.00 - 0.04 K/uL    Lymph # 0.5 (L) 1.0 - 4.8 K/uL    Mono # 0.2 (L) 0.3 - 1.0 K/uL    Eos # 0.1 0.0 - 0.5 K/uL    Baso # 0.01 0.00 - 0.20 K/uL    nRBC 0 0 /100 WBC    Gran % 62.5 38.0 - 73.0 %    Lymph % 23.0 18.0 - 48.0 %    Mono % 10.0 4.0 - 15.0 %    Eosinophil % 3.5 0.0 - 8.0 %    Basophil % 0.5 0.0 - 1.9 %    Differential Method Automated    Comprehensive Metabolic Panel    Collection Time: 01/01/23  5:38 AM   Result Value Ref Range    Sodium 140 136 - 145 mmol/L    Potassium 3.9 3.5 - 5.1 mmol/L    Chloride 106 95 - 110 mmol/L    CO2 31 (H) 23 - 29 mmol/L    Glucose 94 70 - 110 mg/dL    BUN 8 6 - 20 mg/dL    Creatinine 0.4 (L) 0.5 - 1.4 mg/dL    Calcium 7.8 (L) 8.7 - 10.5 mg/dL    Total Protein 6.5 6.0 - 8.4 g/dL    Albumin 1.7 (L) 3.5 - 5.2 g/dL    Total Bilirubin 0.9 0.1 - 1.0 mg/dL    Alkaline Phosphatase 125 55 - 135 U/L    AST 34 10 - 40 U/L    ALT 16 10 - 44 U/L    Anion Gap 3 (L) 8 - 16 mmol/L    eGFR >60.0 >60 mL/min/1.73 m^2   Magnesium    Collection Time: 01/01/23  5:38 AM   Result Value Ref Range    Magnesium 1.9 1.6 - 2.6 mg/dL   CBC Auto Differential    Collection Time: 01/02/23  5:30 AM   Result Value Ref Range    WBC 2.76 (L) 3.90 - 12.70 K/uL    RBC 3.15 (L) 4.00 - 5.40 M/uL    Hemoglobin 8.8 (L) 12.0 - 16.0 g/dL    Hematocrit 27.5 (L) 37.0 - 48.5 %    MCV 87 82 - 98 fL    MCH 27.9 27.0 - 31.0 pg    MCHC 32.0 32.0 - 36.0 g/dL    RDW 17.6 (H) 11.5 - 14.5 %    Platelets 71 (L) 150 - 450 K/uL    MPV 9.6  9.2 - 12.9 fL    Immature Granulocytes 0.7 (H) 0.0 - 0.5 %    Gran # (ANC) 1.8 1.8 - 7.7 K/uL    Immature Grans (Abs) 0.02 0.00 - 0.04 K/uL    Lymph # 0.6 (L) 1.0 - 4.8 K/uL    Mono # 0.2 (L) 0.3 - 1.0 K/uL    Eos # 0.1 0.0 - 0.5 K/uL    Baso # 0.02 0.00 - 0.20 K/uL    nRBC 0 0 /100 WBC    Gran % 66.0 38.0 - 73.0 %    Lymph % 21.4 18.0 - 48.0 %    Mono % 7.6 4.0 - 15.0 %    Eosinophil % 3.6 0.0 - 8.0 %    Basophil % 0.7 0.0 - 1.9 %    Differential Method Automated    Comprehensive Metabolic Panel    Collection Time: 01/02/23  5:30 AM   Result Value Ref Range    Sodium 139 136 - 145 mmol/L    Potassium 4.0 3.5 - 5.1 mmol/L    Chloride 107 95 - 110 mmol/L    CO2 30 (H) 23 - 29 mmol/L    Glucose 89 70 - 110 mg/dL    BUN 10 6 - 20 mg/dL    Creatinine 0.4 (L) 0.5 - 1.4 mg/dL    Calcium 7.8 (L) 8.7 - 10.5 mg/dL    Total Protein 6.8 6.0 - 8.4 g/dL    Albumin 1.7 (L) 3.5 - 5.2 g/dL    Total Bilirubin 1.2 (H) 0.1 - 1.0 mg/dL    Alkaline Phosphatase 136 (H) 55 - 135 U/L    AST 37 10 - 40 U/L    ALT 16 10 - 44 U/L    Anion Gap 2 (L) 8 - 16 mmol/L    eGFR >60.0 >60 mL/min/1.73 m^2   Magnesium    Collection Time: 01/02/23  5:30 AM   Result Value Ref Range    Magnesium 1.9 1.6 - 2.6 mg/dL   CBC Auto Differential    Collection Time: 01/03/23  5:12 AM   Result Value Ref Range    WBC 2.84 (L) 3.90 - 12.70 K/uL    RBC 3.19 (L) 4.00 - 5.40 M/uL    Hemoglobin 8.9 (L) 12.0 - 16.0 g/dL    Hematocrit 27.8 (L) 37.0 - 48.5 %    MCV 87 82 - 98 fL    MCH 27.9 27.0 - 31.0 pg    MCHC 32.0 32.0 - 36.0 g/dL    RDW 17.3 (H) 11.5 - 14.5 %    Platelets 70 (L) 150 - 450 K/uL    MPV 8.9 (L) 9.2 - 12.9 fL    Immature Granulocytes 0.4 0.0 - 0.5 %    Gran # (ANC) 2.0 1.8 - 7.7 K/uL    Immature Grans (Abs) 0.01 0.00 - 0.04 K/uL    Lymph # 0.5 (L) 1.0 - 4.8 K/uL    Mono # 0.2 (L) 0.3 - 1.0 K/uL    Eos # 0.1 0.0 - 0.5 K/uL    Baso # 0.02 0.00 - 0.20 K/uL    nRBC 0 0 /100 WBC    Gran % 68.6 38.0 - 73.0 %    Lymph % 18.0 18.0 - 48.0 %    Mono % 7.4 4.0 - 15.0  %    Eosinophil % 4.9 0.0 - 8.0 %    Basophil % 0.7 0.0 - 1.9 %    Differential Method Automated    Comprehensive Metabolic Panel    Collection Time: 01/03/23  5:12 AM   Result Value Ref Range    Sodium 139 136 - 145 mmol/L    Potassium 3.8 3.5 - 5.1 mmol/L    Chloride 107 95 - 110 mmol/L    CO2 28 23 - 29 mmol/L    Glucose 83 70 - 110 mg/dL    BUN 10 6 - 20 mg/dL    Creatinine 0.4 (L) 0.5 - 1.4 mg/dL    Calcium 7.8 (L) 8.7 - 10.5 mg/dL    Total Protein 6.8 6.0 - 8.4 g/dL    Albumin 1.8 (L) 3.5 - 5.2 g/dL    Total Bilirubin 1.5 (H) 0.1 - 1.0 mg/dL    Alkaline Phosphatase 130 55 - 135 U/L    AST 37 10 - 40 U/L    ALT 16 10 - 44 U/L    Anion Gap 4 (L) 8 - 16 mmol/L    eGFR >60.0 >60 mL/min/1.73 m^2   Magnesium    Collection Time: 01/03/23  5:12 AM   Result Value Ref Range    Magnesium 1.8 1.6 - 2.6 mg/dL      No results found for: PHENYTOIN, PHENOBARB, VALPROATE, CBMZ        CONSTITUTIONAL  General Appearance: age appropriate, lying in bed, overweight    MUSCULOSKELETAL  Muscle Strength and Tone:no tremor, no tic  Abnormal Involuntary Movements: No  Gait and Station: Unable to assess    PSYCHIATRIC   Level of Consciousness: awake, alert , and oriented  Orientation: person, place, time, and situation  Grooming: Casually dressed and Well groomed  Psychomotor Behavior: normal, cooperative, friendly and cooperative  Speech: normal tone, normal rate, normal pitch, normal volume  Language: grossly intact  Mood: fine  Affect: Appropriate, Consistent with mood, and Congruent with thought  Thought Process: linear, logical  Associations: intact   Thought Content: denies SI and denies HI  Perceptions: denies AH and denies  VH  Memory: Able to recall past events, Remote intact, and Recent intact  Attention:Attends to interview without distraction  Fund of Knowledge: Aware of current events and Vocabulary appropriate   Estimate if Intelligence:  Average based on work/education history, vocabulary and mental status exam  Insight:  has awareness of illness  Judgment: limited      PSYCHOSOCIAL    PSYCHOSOCIAL STRESSORS   family and health    FUNCTIONING RELATIONSHIPS   good support system and good relationship with spouse or significant other    STRENGTHS AND LIABILITIES   Strength: Patient accepts guidance/feedback, Strength: Patient is intelligent., Liability: Patient has poor health., Liability: Patient has poor judgment    Is the patient aware of the biomedical complications associated with substance abuse and mental illness? yes    Does the patient have an Advance Directive for Mental Health treatment? no  (If yes, inform patient to bring copy.)        MEDICAL DECISION MAKING        ASSESSMENT       Major depressive disorder, chronic, currently in partial remission  Methamphetamine abuse  OUD with MAT vs suboxone use for chronic pain  Anxiety disorder due to known physiological condition       PROBLEM LIST AND MANAGEMENT PLANS  Major depressive disorder, chronic, currently in partial remission  -Patient counseled  -Restart home bupropion 100 mg BID    Methamphetamine abuse  -Patient counseled  -Denies need for rehab at this time  -Encouraged cessation    OUD with MAT vs suboxone use for chronic pain  -Patient counseled  -Defer to outpatient provider upon discharge    Anxiety disorder due to known physiological condition   -Patient counseled  -Ok to continue alprazolam as ordered, recommend using for shortest possible duration as polypharmacy increases risk for respiratory depression.   -Gabapentin off-label. Will switch from lyrica to gabapentin 300 mg TID per patient request (states gabapentin more effective in managing neuropathic pain)    PRESCRIPTION DRUG MANAGEMENT    Discussed diagnosis, risks and benefits of proposed treatment vs alternative treatments vs no treatment, potential side effects of these treatments and the inherent unpredictability of treatment. The patient expresses understanding of the above and displays the capacity  to agree with this treatment given said understanding. Patient also agrees that, currently, the benefits outweigh the risks and would like to pursue/continue treatment at this time.    Any medications being used off-label were discussed with the patient inclusive of the evidence base for the use of the medications and consent was obtained for the off-label use of the medication.         DIAGNOSTIC TESTING  Labs reviewed with patient; follow up pending labs    Disposition:   Inpatient psychiatric hospitalization not indicated at this time.         Micky Perkins, ORALIAP-BC

## 2023-01-03 NOTE — PT/OT/SLP PROGRESS
Physical Therapy Treatment    Patient Name:  Rachel Zazueta   MRN:  [id number]    Recommendations:     Discharge Recommendations:  (TBD)  Discharge Equipment Recommendations:  (TBD)  Barriers to discharge: None    Assessment:     Rachel Zazueta is a 42 y.o. female admitted with a medical diagnosis of Severe sepsis.  She presents with the following impairments/functional limitations: weakness, impaired endurance, gait instability, impaired self care skills, impaired functional mobility, decreased safety awareness, decreased lower extremity function, edema. Patient is able to perform supine to sit with Mod A and sit EOB with SBA. She demonstrates fear avoidance with functional mobility and demonstrates significant weakness in her B LE.     Rehab Prognosis: Good; patient would benefit from acute skilled PT services to address these deficits and reach maximum level of function.    Recent Surgery: * No surgery found *      Plan:     During this hospitalization, patient to be seen 5 x/week to address the identified rehab impairments via gait training, therapeutic activities, therapeutic exercises and progress toward the following goals:    Plan of Care Expires:  01/09/23    Subjective     Chief Complaint: LBP and fatigue   Patient/Family Comments/goals: return to PLOF  Pain/Comfort:  Pain Rating 1: 10/10  Location - Side 1: Bilateral  Location - Orientation 1: lower  Location 1: back  Pain Addressed 1: Reposition, Distraction  Pain Rating Post-Intervention 1: 6/10  Location - Side 2: Bilateral  Location - Orientation 2: lower  Location 2: back      Objective:     Communicated with patient prior to session.  Patient found HOB elevated with telemetry, peripheral IV, PureWick upon PT entry to room.     General Precautions: Standard, fall  Orthopedic Precautions: N/A  Braces: N/A  Respiratory Status: Room air     Functional Mobility:  Bed Mobility:     Scooting: minimum assistance  Supine to Sit: moderate assistance  Sit  to Supine: moderate assistance  Transfers:     Balance: Patient was able to sit EOB with SBA      AM-PAC 6 CLICK MOBILITY  Turning over in bed (including adjusting bedclothes, sheets and blankets)?: 2  Sitting down on and standing up from a chair with arms (e.g., wheelchair, bedside commode, etc.): 1  Moving from lying on back to sitting on the side of the bed?: 2  Moving to and from a bed to a chair (including a wheelchair)?: 1  Need to walk in hospital room?: 1  Climbing 3-5 steps with a railing?: 1  Basic Mobility Total Score: 8       Treatment & Education:  Patient was able to perform bed mobility, sitting EOB, and scooting towards the head of the bed this visit with fatigue and fear avoidance noted.     Patient left HOB elevated with all lines intact and call button in reach..    GOALS:   Multidisciplinary Problems       Physical Therapy Goals          Problem: Physical Therapy    Goal Priority Disciplines Outcome Goal Variances Interventions   Physical Therapy Goal     PT, PT/OT Ongoing, Progressing     Description: Goals to be met by: 2023     Patient will increase functional independence with mobility by performin. Supine to sit with Modified [place]  2. Sit to supine with Modified [place]  3. Sit to stand transfer with Stand-by Assistance  4. Bed to chair transfer with Stand-by Assistance using Rolling Walker  5. Gait  x 50 feet with Contact Guard Assistance using Rolling Walker.   6. Sitting at edge of bed x10 minutes with [place] to perform (B) LE therex                           Time Tracking:     PT Received On: 23  PT Start Time: 1520     PT Stop Time: 1535  PT Total Time (min): 15 min     Billable Minutes: Therapeutic Activity bed mobility and sitting EOB     Treatment Type: Treatment  PT/PTA: PT           2023

## 2023-01-03 NOTE — PROGRESS NOTES
"[facility] Medicine  Progress Note    Patient Name: Rachel Zazueta  MRN: [id number]  Patient Class: IP- Inpatient   Admission Date: 12/23/2022  Length of Stay: 11 days  Attending Physician: Dannielle Merino DO  Primary Care Provider: Dannielle Merino DO    Subjective:     Principal Problem:Severe sepsis      HPI:  ER HPI:  42-year-old female with a history of anemia, anxiety, recurrent cellulitis, hepatitis C, IVDU, liver cirrhosis, cholecystectomy, kidney stones, lumbar fusion, shoulder surgery, chronic back pain comes in c/o generalized weakness, fatigue, and bilateral flank pain.  She reports that this has been going on intermittently for several days and was diagnosed with a UTI recently.  No fever.  No abdominal pain or vomiting or diarrhea.     IM HPI:  Patient is well known to our service.  Patient has a history of IV drug abuse and unfortunately has relapsed.  Patient has a history of a epidural abscess requiring a large lumbar surgery with multiple rehabilitative stays and therapy.  The patient was progressing to ambulation and recently has declined.  Patient admits to starting drugs including IV drug use again.  Patient presented to the ED with the above symptoms and she has a noticeable cardiac murmur today that we are getting a stat echocardiogram.  Patient's been started on antibiotics fluid resuscitated and seems to have sepsis.  Patient's urinary studies were abnormal but not overwhelming.      Overview/Hospital Course:  12/24/22 CG: Patient remains in the ICU today. Has a lot of muscle spasms and low back pain. Echocardiogram ordered yesterday and completed; significant for pulm HTN but without vegetations.   12/25/22 CG:  Overnight she was having a lot of significant discomfort and pain.  We placed her on Precedex and this has helped significantly with her body aches, her irritability and muscle spasms."  12/26/22 FM:  Patient required Precedex for agitation and mood " changes.  Patient is calm and cooperative this morning.  Patient appears to have E coli sepsis so will taper antibiotics.  Patient's echocardiogram showed no vegetation and E coli would be low risk for metastatic infection.  Will transfer the patient to the floor once her Precedex discontinue we counseled her again today on the importance of avoiding substances and illicit drugs.  This continues to be setback for her.  12/27/22 WC:  Patient overall remains stable and is slowly improving.  Urine culture has revealed Candida albicans in addition to blood culture revealing E coli.  12/28/22 WC:  Patient resting comfortably this morning.  Pharm ID on case for treatment duration recs.    12/29/22 WC:  no acute events overnight.  Back pain at this time.   12/20/22:  no acute events continue IV ABX for esbl  12/31/22 CG: stable, continuing antibiotics.   1/1/2 CG: stable, continuing antibiotics. Has not had a bowel movement in two weeks. Will give lactulose.    01/02/2023: Patient did have a bowel movement yesterday.  Completing last day of antimicrobial therapy anticipated discharge tomorrow.  1/3/2023: Improving BM with lactulose. Lower extremity weakness requiring aggressive PT and OT efforts. Patient understands she needs to be an active participant. Encouraged inbed physical exercises without assistance with therapists. Stable Hg/HCT.       Interval History: Patient seen and examined.  No acute events overnight.     Review of Systems   Constitutional:  Positive for activity change. Negative for appetite change, chills, diaphoresis, fatigue and fever.   HENT:  Negative for sore throat.    Eyes:  Negative for pain.   Respiratory:  Negative for cough, choking, chest tightness and shortness of breath.    Cardiovascular:  Negative for chest pain, palpitations and leg swelling.   Gastrointestinal:  Negative for abdominal pain, constipation, diarrhea, nausea, rectal pain and vomiting.   Genitourinary:  Negative for  dyspareunia, dysuria, flank pain, frequency, genital sores, hematuria and menstrual problem.   Musculoskeletal:  Positive for arthralgias, back pain, gait problem, myalgias and neck pain.   Skin:  Negative for pallor, rash and wound.   Neurological:  Positive for weakness. Negative for dizziness, tremors, speech difficulty and headaches.   Psychiatric/Behavioral:  Negative for agitation, behavioral problems, confusion, decreased concentration and dysphoric mood.    Objective:     Vital Signs (Most Recent):  Temp: 98.2 °F (36.8 °C) (01/03/23 0717)  Pulse: 94 (01/03/23 0740)  Resp: 20 (01/03/23 0717)  BP: (!) 112/56 (01/03/23 0717)  SpO2: 100 % (01/03/23 0717)   Vital Signs (24h Range):  Temp:  [97.6 °F (36.4 °C)-99 °F (37.2 °C)] 98.2 °F (36.8 °C)  Pulse:  [88-98] 94  Resp:  [16-20] 20  SpO2:  [96 %-100 %] 100 %  BP: (104-122)/(55-67) 112/56     Weight: 97.5 kg (215 lb)  Body mass index is 33.67 kg/m².    Intake/Output Summary (Last 24 hours) at 1/3/2023 0801  Last data filed at 1/3/2023 0659  Gross per 24 hour   Intake 250 ml   Output 600 ml   Net -350 ml      Physical Exam  Vitals and nursing note reviewed.   Constitutional:       General: She is not in acute distress.     Appearance: She is obese. She is ill-appearing.   HENT:      Head: Normocephalic and atraumatic.      Right Ear: External ear normal.      Left Ear: External ear normal.      Nose: Nose normal. No congestion or rhinorrhea.      Mouth/Throat:      Mouth: Mucous membranes are dry.      Pharynx: No oropharyngeal exudate.   Eyes:      General: No scleral icterus.     Extraocular Movements: Extraocular movements intact.      Conjunctiva/sclera: Conjunctivae normal.   Neck:      Vascular: No carotid bruit.   Cardiovascular:      Rate and Rhythm: Regular rhythm. Tachycardia present.      Heart sounds: Murmur heard.     No friction rub. No gallop.   Pulmonary:      Effort: No respiratory distress.      Breath sounds: No stridor. No wheezing, rhonchi or  rales.   Chest:      Chest wall: No tenderness.   Abdominal:      General: There is no distension.      Palpations: Abdomen is soft. There is no mass.      Tenderness: There is no abdominal tenderness. There is no right CVA tenderness, left CVA tenderness or guarding.   Musculoskeletal:         General: No swelling, tenderness or deformity.      Cervical back: Neck supple. No rigidity or tenderness.      Right lower leg: Edema present.      Left lower leg: Edema present.   Lymphadenopathy:      Cervical: No cervical adenopathy.   Skin:     Capillary Refill: Capillary refill takes less than 2 seconds.      Coloration: Skin is not jaundiced or pale.   Neurological:      Mental Status: She is oriented to person, place, and time. Mental status is at baseline.      Cranial Nerves: No cranial nerve deficit.      Sensory: Sensory deficit present.      Motor: Weakness present.   Psychiatric:         Mood and Affect: Mood normal.         Behavior: Behavior normal.      Comments: Calm, cooperative, moving all extremities.     Significant Labs: All pertinent labs within the past 24 hours have been reviewed.  BMP:   Recent Labs   Lab 01/03/23  0512   GLU 83      K 3.8      CO2 28   BUN 10   CREATININE 0.4*   CALCIUM 7.8*   MG 1.8     CBC:   Recent Labs   Lab 01/02/23  0530 01/03/23  0512   WBC 2.76* 2.84*   HGB 8.8* 8.9*   HCT 27.5* 27.8*   PLT 71* 70*     CMP:   Recent Labs   Lab 01/02/23  0530 01/03/23  0512    139   K 4.0 3.8    107   CO2 30* 28   GLU 89 83   BUN 10 10   CREATININE 0.4* 0.4*   CALCIUM 7.8* 7.8*   PROT 6.8 6.8   ALBUMIN 1.7* 1.8*   BILITOT 1.2* 1.5*   ALKPHOS 136* 130   AST 37 37   ALT 16 16   ANIONGAP 2* 4*     Significant Imaging: I have reviewed all pertinent imaging results/findings within the past 24 hours.      Assessment/Plan:      * Severe sepsis  This patient does have evidence of infective focus  My overall impression is sepsis. Vital signs were reviewed and noted in progress  note.  Antibiotics given-   Antibiotics (From admission, onward)    Start     Stop Route Frequency Ordered    01/02/23 1515  meropenem (MERREM) 1 g in sodium chloride 0.9 % 100 mL IVPB (MB+)        See Hyperspace for full Linked Orders Report.    01/03 0714 IV Every 8 hours (non-standard times) 01/02/23 0923    12/23/22 0533  vancomycin (VANCOCIN) 1,000 mg injection        Note to Pharmacy: Created by cabinet override    12/23 1744   12/23/22 0533    12/23/22 0405  piperacillin-tazobactam (ZOSYN) 4.5 gram injection        Note to Pharmacy: Created by cabinet override    12/23 1614   12/23/22 0405        Cultures were taken-   Microbiology Results (last 7 days)     Procedure Component Value Units Date/Time    Blood culture [[id number]] Collected: 12/28/22 1058    Order Status: Completed Specimen: Blood Updated: 01/02/23 1812     Blood Culture, Routine No growth after 5 days.    Blood culture [[id number]] Collected: 12/28/22 1058    Order Status: Completed Specimen: Blood Updated: 01/02/23 1812     Blood Culture, Routine No growth after 5 days.    Blood culture #2 **CANNOT BE ORDERED STAT** [[id number]] Collected: 12/23/22 0257    Order Status: Completed Specimen: Blood Updated: 12/28/22 2022     Blood Culture, Routine No growth after 5 days.        Latest lactate reviewed, they are-  No results for input(s): LACTATE in the last 72 hours.    Organ dysfunction indicated by Acute kidney injury, Encephalopathy  and Acute liver injury  Source- ?    Source control Achieved by- see orders  - Merrem day 7 of 7 (completed on 1/3/23)  - Blood cx x2 (1/2/23) NGTD      Pulmonary hypertension  Found incidentally on echocardiogram with evidence of elevated right sided pressures and dilated heart chambers on the right side. Unclear the exact etiology or class of Pulm HTN but given her significant substance abuse Class 1 certainly is a possibility.   - Will refer to outpatient pulmonologist.   - Will consider a cariology consult while  inpatient to see if they think it would be beneficial to have a heart cath done while inpatient      Murmur, cardiac  No evidence of vegetations.     Mild episode of recurrent major depressive disorder  Patient with altered mental status holding antidepressants for now      Cannabis use disorder, moderate, dependence  As above      Amphetamine use disorder, severe  Continue to  educate and support      Spinal stenosis at L4-L5 level        Substance use disorder  Cessation advised. Follow up consult psychiatry.     Chronic hepatitis C with cirrhosis  Labs noted, will require follow up outpatient with GI to start treatment.       Cirrhosis of liver without ascites  Labs noted, stable.    AST   Date Value Ref Range Status   01/03/2023 37 10 - 40 U/L Final   01/02/2023 37 10 - 40 U/L Final   01/01/2023 34 10 - 40 U/L Final   12/31/2022 34 10 - 40 U/L Final   12/30/2022 35 10 - 40 U/L Final     ALT   Date Value Ref Range Status   01/03/2023 16 10 - 44 U/L Final   01/02/2023 16 10 - 44 U/L Final   01/01/2023 16 10 - 44 U/L Final   12/31/2022 16 10 - 44 U/L Final   12/30/2022 16 10 - 44 U/L Final        Alkaline Phosphatase   Date Value Ref Range Status   01/03/2023 130 55 - 135 U/L Final   01/02/2023 136 (H) 55 - 135 U/L Final   01/01/2023 125 55 - 135 U/L Final   12/31/2022 126 55 - 135 U/L Final   12/30/2022 117 55 - 135 U/L Final            UTI (urinary tract infection)  On abx, ? Source.  Suspect source of sepsis.  - Treat candida x 2 weeks given hx.      VTE Risk Mitigation (From admission, onward)         Ordered     IP VTE HIGH RISK PATIENT  Once         12/23/22 0647     Place sequential compression device  Until discontinued         12/23/22 0647                Discharge Planning   SHIRA:      Code Status: Full Code   Is the patient medically ready for discharge?:     Reason for patient still in hospital (select all that apply): PT / OT recommendations  Discharge Plan A: Home with family        Dannielle VALDEZ  DO Angelique  Department of Hospital Medicine   [facility] [address]

## 2023-01-03 NOTE — ASSESSMENT & PLAN NOTE
Patient with altered mental status holding antidepressants for now     [General Appearance - Well Developed] : well developed [General Appearance - Well Nourished] : well nourished [Abdomen Soft] : soft [Abdomen Tenderness] : non-tender [Costovertebral Angle Tenderness] : no ~M costovertebral angle tenderness [Edema] : no peripheral edema [] : no respiratory distress [Exaggerated Use Of Accessory Muscles For Inspiration] : no accessory muscle use [Oriented To Time, Place, And Person] : oriented to person, place, and time [No Focal Deficits] : no focal deficits

## 2023-01-03 NOTE — ASSESSMENT & PLAN NOTE
Labs noted, stable.    AST   Date Value Ref Range Status   01/03/2023 37 10 - 40 U/L Final   01/02/2023 37 10 - 40 U/L Final   01/01/2023 34 10 - 40 U/L Final   12/31/2022 34 10 - 40 U/L Final   12/30/2022 35 10 - 40 U/L Final     ALT   Date Value Ref Range Status   01/03/2023 16 10 - 44 U/L Final   01/02/2023 16 10 - 44 U/L Final   01/01/2023 16 10 - 44 U/L Final   12/31/2022 16 10 - 44 U/L Final   12/30/2022 16 10 - 44 U/L Final        Alkaline Phosphatase   Date Value Ref Range Status   01/03/2023 130 55 - 135 U/L Final   01/02/2023 136 (H) 55 - 135 U/L Final   01/01/2023 125 55 - 135 U/L Final   12/31/2022 126 55 - 135 U/L Final   12/30/2022 117 55 - 135 U/L Final

## 2023-01-03 NOTE — ASSESSMENT & PLAN NOTE
This patient does have evidence of infective focus  My overall impression is sepsis. Vital signs were reviewed and noted in progress note.  Antibiotics given-   Antibiotics (From admission, onward)    Start     Stop Route Frequency Ordered    01/02/23 1515  meropenem (MERREM) 1 g in sodium chloride 0.9 % 100 mL IVPB (MB+)        See Hyperspace for full Linked Orders Report.    01/03 0714 IV Every 8 hours (non-standard times) 01/02/23 0923    12/23/22 0533  vancomycin (VANCOCIN) 1,000 mg injection        Note to Pharmacy: Created by cabinet override    12/23 1744   12/23/22 0533    12/23/22 0405  piperacillin-tazobactam (ZOSYN) 4.5 gram injection        Note to Pharmacy: Created by cabinet override    12/23 1614   12/23/22 0405        Cultures were taken-   Microbiology Results (last 7 days)     Procedure Component Value Units Date/Time    Blood culture [[id number]] Collected: 12/28/22 1058    Order Status: Completed Specimen: Blood Updated: 01/02/23 1812     Blood Culture, Routine No growth after 5 days.    Blood culture [[id number]] Collected: 12/28/22 1058    Order Status: Completed Specimen: Blood Updated: 01/02/23 1812     Blood Culture, Routine No growth after 5 days.    Blood culture #2 **CANNOT BE ORDERED STAT** [[id number]] Collected: 12/23/22 0257    Order Status: Completed Specimen: Blood Updated: 12/28/22 2022     Blood Culture, Routine No growth after 5 days.        Latest lactate reviewed, they are-  No results for input(s): LACTATE in the last 72 hours.    Organ dysfunction indicated by Acute kidney injury, Encephalopathy  and Acute liver injury  Source- ?    Source control Achieved by- see orders  - Merrem day 7 of 7 (completed on 1/3/23)  - Blood cx x2 (1/2/23) NGTD

## 2023-01-04 LAB
ALBUMIN SERPL BCP-MCNC: 1.8 G/DL (ref 3.5–5.2)
ALP SERPL-CCNC: 130 U/L (ref 55–135)
ALT SERPL W/O P-5'-P-CCNC: 17 U/L (ref 10–44)
ANION GAP SERPL CALC-SCNC: 4 MMOL/L (ref 8–16)
AST SERPL-CCNC: 40 U/L (ref 10–40)
BASOPHILS # BLD AUTO: 0.02 K/UL (ref 0–0.2)
BASOPHILS NFR BLD: 0.8 % (ref 0–1.9)
BILIRUB SERPL-MCNC: 1.2 MG/DL (ref 0.1–1)
BUN SERPL-MCNC: 13 MG/DL (ref 6–20)
CALCIUM SERPL-MCNC: 7.8 MG/DL (ref 8.7–10.5)
CHLORIDE SERPL-SCNC: 106 MMOL/L (ref 95–110)
CO2 SERPL-SCNC: 29 MMOL/L (ref 23–29)
CREAT SERPL-MCNC: 0.4 MG/DL (ref 0.5–1.4)
DIFFERENTIAL METHOD: ABNORMAL
EOSINOPHIL # BLD AUTO: 0.1 K/UL (ref 0–0.5)
EOSINOPHIL NFR BLD: 3.8 % (ref 0–8)
ERYTHROCYTE [DISTWIDTH] IN BLOOD BY AUTOMATED COUNT: 17.4 % (ref 11.5–14.5)
EST. GFR  (NO RACE VARIABLE): >60 ML/MIN/1.73 M^2
GLUCOSE SERPL-MCNC: 95 MG/DL (ref 70–110)
HCT VFR BLD AUTO: 27.3 % (ref 37–48.5)
HGB BLD-MCNC: 8.8 G/DL (ref 12–16)
IMM GRANULOCYTES # BLD AUTO: 0.01 K/UL (ref 0–0.04)
IMM GRANULOCYTES NFR BLD AUTO: 0.4 % (ref 0–0.5)
LYMPHOCYTES # BLD AUTO: 0.5 K/UL (ref 1–4.8)
LYMPHOCYTES NFR BLD: 19.2 % (ref 18–48)
MAGNESIUM SERPL-MCNC: 1.8 MG/DL (ref 1.6–2.6)
MCH RBC QN AUTO: 27.8 PG (ref 27–31)
MCHC RBC AUTO-ENTMCNC: 32.2 G/DL (ref 32–36)
MCV RBC AUTO: 86 FL (ref 82–98)
MONOCYTES # BLD AUTO: 0.2 K/UL (ref 0.3–1)
MONOCYTES NFR BLD: 8.8 % (ref 4–15)
NEUTROPHILS # BLD AUTO: 1.7 K/UL (ref 1.8–7.7)
NEUTROPHILS NFR BLD: 67 % (ref 38–73)
NRBC BLD-RTO: 0 /100 WBC
PLATELET # BLD AUTO: 63 K/UL (ref 150–450)
PMV BLD AUTO: 8.7 FL (ref 9.2–12.9)
POTASSIUM SERPL-SCNC: 3.9 MMOL/L (ref 3.5–5.1)
PROT SERPL-MCNC: 6.9 G/DL (ref 6–8.4)
RBC # BLD AUTO: 3.17 M/UL (ref 4–5.4)
SODIUM SERPL-SCNC: 139 MMOL/L (ref 136–145)
WBC # BLD AUTO: 2.6 K/UL (ref 3.9–12.7)

## 2023-01-04 PROCEDURE — 63600175 PHARM REV CODE 636 W HCPCS: Performed by: INTERNAL MEDICINE

## 2023-01-04 PROCEDURE — 25000003 PHARM REV CODE 250: Performed by: INTERNAL MEDICINE

## 2023-01-04 PROCEDURE — 85025 COMPLETE CBC W/AUTO DIFF WBC: CPT | Performed by: INTERNAL MEDICINE

## 2023-01-04 PROCEDURE — 27000207 HC ISOLATION

## 2023-01-04 PROCEDURE — 97535 SELF CARE MNGMENT TRAINING: CPT | Mod: CO

## 2023-01-04 PROCEDURE — 97535 SELF CARE MNGMENT TRAINING: CPT

## 2023-01-04 PROCEDURE — 97110 THERAPEUTIC EXERCISES: CPT

## 2023-01-04 PROCEDURE — 25000003 PHARM REV CODE 250: Performed by: PSYCHIATRY & NEUROLOGY

## 2023-01-04 PROCEDURE — 11000001 HC ACUTE MED/SURG PRIVATE ROOM

## 2023-01-04 PROCEDURE — 99238 HOSP IP/OBS DSCHRG MGMT 30/<: CPT | Mod: ,,, | Performed by: STUDENT IN AN ORGANIZED HEALTH CARE EDUCATION/TRAINING PROGRAM

## 2023-01-04 PROCEDURE — 36415 COLL VENOUS BLD VENIPUNCTURE: CPT | Performed by: INTERNAL MEDICINE

## 2023-01-04 PROCEDURE — 25000003 PHARM REV CODE 250: Performed by: EMERGENCY MEDICINE

## 2023-01-04 PROCEDURE — A4216 STERILE WATER/SALINE, 10 ML: HCPCS | Performed by: INTERNAL MEDICINE

## 2023-01-04 PROCEDURE — 83735 ASSAY OF MAGNESIUM: CPT | Performed by: INTERNAL MEDICINE

## 2023-01-04 PROCEDURE — 80053 COMPREHEN METABOLIC PANEL: CPT | Performed by: INTERNAL MEDICINE

## 2023-01-04 PROCEDURE — 99238 PR HOSPITAL DISCHARGE DAY,<30 MIN: ICD-10-PCS | Mod: ,,, | Performed by: STUDENT IN AN ORGANIZED HEALTH CARE EDUCATION/TRAINING PROGRAM

## 2023-01-04 PROCEDURE — 97110 THERAPEUTIC EXERCISES: CPT | Mod: CO

## 2023-01-04 RX ORDER — LACTULOSE 10 G/15ML
20 SOLUTION ORAL 3 TIMES DAILY
Qty: 2700 ML | Refills: 2 | Status: SHIPPED | OUTPATIENT
Start: 2023-01-04 | End: 2023-04-04

## 2023-01-04 RX ORDER — METHOCARBAMOL 500 MG/1
500 TABLET, FILM COATED ORAL 3 TIMES DAILY
Qty: 30 TABLET | Refills: 0 | Status: SHIPPED | OUTPATIENT
Start: 2023-01-04 | End: 2023-01-14

## 2023-01-04 RX ORDER — GABAPENTIN 300 MG/1
300 CAPSULE ORAL 3 TIMES DAILY
Qty: 90 CAPSULE | Refills: 11 | Status: ON HOLD | OUTPATIENT
Start: 2023-01-04 | End: 2023-02-27 | Stop reason: HOSPADM

## 2023-01-04 RX ADMIN — GABAPENTIN 300 MG: 300 CAPSULE ORAL at 09:01

## 2023-01-04 RX ADMIN — Medication 10 ML: at 12:01

## 2023-01-04 RX ADMIN — METHOCARBAMOL TABLETS 500 MG: 500 TABLET, COATED ORAL at 05:01

## 2023-01-04 RX ADMIN — LACTULOSE 20 G: 20 SOLUTION ORAL at 09:01

## 2023-01-04 RX ADMIN — Medication 10 ML: at 09:01

## 2023-01-04 RX ADMIN — GABAPENTIN 300 MG: 300 CAPSULE ORAL at 02:01

## 2023-01-04 RX ADMIN — Medication 10 ML: at 01:01

## 2023-01-04 RX ADMIN — METHOCARBAMOL TABLETS 500 MG: 500 TABLET, COATED ORAL at 12:01

## 2023-01-04 RX ADMIN — ALPRAZOLAM 0.5 MG: 0.5 TABLET ORAL at 02:01

## 2023-01-04 RX ADMIN — MORPHINE SULFATE 2 MG: 2 INJECTION, SOLUTION INTRAMUSCULAR; INTRAVENOUS at 12:01

## 2023-01-04 RX ADMIN — METHOCARBAMOL TABLETS 500 MG: 500 TABLET, COATED ORAL at 09:01

## 2023-01-04 RX ADMIN — POLYETHYLENE GLYCOL (3350) 17 G: 17 POWDER, FOR SOLUTION ORAL at 09:01

## 2023-01-04 RX ADMIN — BUPROPION HYDROCHLORIDE 100 MG: 100 TABLET, FILM COATED ORAL at 09:01

## 2023-01-04 RX ADMIN — PANTOPRAZOLE SODIUM 40 MG: 40 TABLET, DELAYED RELEASE ORAL at 09:01

## 2023-01-04 RX ADMIN — IBUPROFEN 400 MG: 400 TABLET, FILM COATED ORAL at 11:01

## 2023-01-04 RX ADMIN — MORPHINE SULFATE 2 MG: 2 INJECTION, SOLUTION INTRAMUSCULAR; INTRAVENOUS at 01:01

## 2023-01-04 RX ADMIN — LACTULOSE 20 G: 20 SOLUTION ORAL at 02:01

## 2023-01-04 RX ADMIN — HYDROCODONE BITARTRATE AND ACETAMINOPHEN 1 TABLET: 5; 325 TABLET ORAL at 09:01

## 2023-01-04 NOTE — PT/OT/SLP PROGRESS
Physical Therapy      Patient Name:  Rachel Zazueta   MRN:  [id number]    Patient not seen today secondary to Other (Comment). Will follow-up on 1/5/23.

## 2023-01-04 NOTE — PLAN OF CARE
Pt being DC home with outpatient therapy. Pt had concerns of discharge. FAMILIA and  Notified. After taking with FAMILIA and Dr. Perez to [place]. Pt stated she is calling her ride.

## 2023-01-04 NOTE — ASSESSMENT & PLAN NOTE
This patient does have evidence of infective focus  My overall impression is sepsis. Vital signs were reviewed and noted in progress note.  Antibiotics given-   Antibiotics (From admission, onward)    Start     Stop Route Frequency Ordered    01/02/23 1515  meropenem (MERREM) 1 g in sodium chloride 0.9 % 100 mL IVPB (MB+)        See Hyperspace for full Linked Orders Report.    01/03 0714 IV Every 8 hours (non-standard times) 01/02/23 0923    12/23/22 0533  vancomycin (VANCOCIN) 1,000 mg injection        Note to Pharmacy: Created by cabinet override    12/23 1744   12/23/22 0533    12/23/22 0405  piperacillin-tazobactam (ZOSYN) 4.5 gram injection        Note to Pharmacy: Created by cabinet override    12/23 1614   12/23/22 0405        Cultures were taken-   Microbiology Results (last 7 days)     Procedure Component Value Units Date/Time    Blood culture [[id number]] Collected: 12/28/22 1058    Order Status: Completed Specimen: Blood Updated: 01/02/23 1812     Blood Culture, Routine No growth after 5 days.    Blood culture [[id number]] Collected: 12/28/22 1058    Order Status: Completed Specimen: Blood Updated: 01/02/23 1812     Blood Culture, Routine No growth after 5 days.    Blood culture #2 **CANNOT BE ORDERED STAT** [[id number]] Collected: 12/23/22 0257    Order Status: Completed Specimen: Blood Updated: 12/28/22 2022     Blood Culture, Routine No growth after 5 days.        Latest lactate reviewed, they are-  No results for input(s): LACTATE in the last 72 hours.    Organ dysfunction indicated by Acute kidney injury, Encephalopathy  and Acute liver injury  Source- ?    Source control Achieved by- see orders  - Merrem day 7 of 7 (completed on 1/3/23)  - Blood cx x2 (1/2/23) NGTD

## 2023-01-04 NOTE — ASSESSMENT & PLAN NOTE
Component      Latest Ref Rng & Units 12/23/2022   Urine Culture, Routine       JACINTA ALBICANS (A) . . .   Suspect source of sepsis.  - Treat candida x 2 weeks given hx.

## 2023-01-04 NOTE — PLAN OF CARE
Problem: Occupational Therapy  Goal: Occupational Therapy Goal  Description: Goals to be met by: 01/20/22     Patient will increase functional independence with ADLs by performing:    UE Dressing with Set-up Assistance.  LE Dressing with Minimal Assistance.  Grooming while seated with Modified [place].  Toileting from toilet with Supervision for hygiene and clothing management.   Bathing from  shower chair/bench with Minimal Assistance.  Step transfer with Minimal Assistance  Toilet transfer to toilet with Minimal Assistance.  Increased functional strength to 4/5 for bilateral UE's.    Outcome: Ongoing, Progressing

## 2023-01-04 NOTE — DISCHARGE SUMMARY
[facility]  Discharge Summary      Patient Name: Rachel Zazueta  MRN: [id number]  KAY: [id number]  Patient Class: IP- Inpatient  Admission Date: 12/23/2022  Hospital Length of Stay: 12 days  Discharge Date and Time:  01/04/2023 1:38 PM  Attending Physician: Dannielle Merino DO   Discharging Provider: Dannielle Merino DO  Primary Care Provider: Dannielle Merino DO    Primary Care Team: Networked reference to record PCT     HPI:   ER HPI:  42-year-old female with a history of anemia, anxiety, recurrent cellulitis, hepatitis C, IVDU, liver cirrhosis, cholecystectomy, kidney stones, lumbar fusion, shoulder surgery, chronic back pain comes in c/o generalized weakness, fatigue, and bilateral flank pain.  She reports that this has been going on intermittently for several days and was diagnosed with a UTI recently.  No fever.  No abdominal pain or vomiting or diarrhea.     IM HPI:  Patient is well known to our service.  Patient has a history of IV drug abuse and unfortunately has relapsed.  Patient has a history of a epidural abscess requiring a large lumbar surgery with multiple rehabilitative stays and therapy.  The patient was progressing to ambulation and recently has declined.  Patient admits to starting drugs including IV drug use again.  Patient presented to the ED with the above symptoms and she has a noticeable cardiac murmur today that we are getting a stat echocardiogram.  Patient's been started on antibiotics fluid resuscitated and seems to have sepsis.  Patient's urinary studies were abnormal but not overwhelming.      * No surgery found *      Hospital Course:   12/24/22 CG: Patient remains in the ICU today. Has a lot of muscle spasms and low back pain. Echocardiogram ordered yesterday and completed; significant for pulm HTN but without vegetations.   12/25/22 CG:  Overnight she was having a lot of significant discomfort and pain.  We placed her on Precedex and this has  "helped significantly with her body aches, her irritability and muscle spasms."  12/26/22 FM:  Patient required Precedex for agitation and mood changes.  Patient is calm and cooperative this morning.  Patient appears to have E coli sepsis so will taper antibiotics.  Patient's echocardiogram showed no vegetation and E coli would be low risk for metastatic infection.  Will transfer the patient to the floor once her Precedex discontinue we counseled her again today on the importance of avoiding substances and illicit drugs.  This continues to be setback for her.  12/27/22 WC:  Patient overall remains stable and is slowly improving.  Urine culture has revealed Candida albicans in addition to blood culture revealing E coli.  12/28/22 WC:  Patient resting comfortably this morning.  Pharm ID on case for treatment duration recs.    12/29/22 WC:  no acute events overnight.  Back pain at this time.   12/20/22:  no acute events continue IV ABX for esbl  12/31/22 CG: stable, continuing antibiotics.   1/1/2 CG: stable, continuing antibiotics. Has not had a bowel movement in two weeks. Will give lactulose.    01/02/2023: Patient did have a bowel movement yesterday.  Completing last day of antimicrobial therapy anticipated discharge tomorrow.  1/3/2023: Improving BM with lactulose. Lower extremity weakness requiring aggressive PT and OT efforts. Patient understands she needs to be an active participant. Encouraged inbed physical exercises without assistance with therapists. Stable Hg/HCT.   1/4/2023: Lactulose every other day for regular bowel movement. PT and OT recommendations with continue efforts to help with lower extremity weakness precipitated from minimal participation from patient over the past week to remain physically active. Hg/HCT remains stable. Last blood Cx x2 (12/28) NGTD x5 days. Patient agrees moving forward lower extremity strengthening will have to take place with her participation and motivation. Will " discharge with continue PT and OT efforts outpatient. She acknowledges outpatient follow up with GI/hepatologist, hematologist and neurologist required to address all other chronic medication conditions.        Goals of Care Treatment Preferences:  Code Status: Full Code      Consults:   Consults (From admission, onward)        Status Ordering Provider     Inpatient consult to Psychiatry  Once        Provider:  Micky Perkins NP    Completed CAMILA LOVE          * Severe sepsis  This patient does have evidence of infective focus  My overall impression is sepsis. Vital signs were reviewed and noted in progress note.  Antibiotics given-   Antibiotics (From admission, onward)    Start     Stop Route Frequency Ordered    01/02/23 1515  meropenem (MERREM) 1 g in sodium chloride 0.9 % 100 mL IVPB (MB+)        See Hyperspace for full Linked Orders Report.    01/03 0714 IV Every 8 hours (non-standard times) 01/02/23 0923    12/23/22 0533  vancomycin (VANCOCIN) 1,000 mg injection        Note to Pharmacy: Created by cabinet override    12/23 1744   12/23/22 0533    12/23/22 0405  piperacillin-tazobactam (ZOSYN) 4.5 gram injection        Note to Pharmacy: Created by cabinet override    12/23 1614   12/23/22 0405        Cultures were taken-   Microbiology Results (last 7 days)     Procedure Component Value Units Date/Time    Blood culture [[id number]] Collected: 12/28/22 1058    Order Status: Completed Specimen: Blood Updated: 01/02/23 1812     Blood Culture, Routine No growth after 5 days.    Blood culture [[id number]] Collected: 12/28/22 1058    Order Status: Completed Specimen: Blood Updated: 01/02/23 1812     Blood Culture, Routine No growth after 5 days.    Blood culture #2 **CANNOT BE ORDERED STAT** [[id number]] Collected: 12/23/22 0257    Order Status: Completed Specimen: Blood Updated: 12/28/22 2022     Blood Culture, Routine No growth after 5 days.        Latest lactate reviewed, they are-  No results for  input(s): LACTATE in the last 72 hours.    Organ dysfunction indicated by Acute kidney injury, Encephalopathy  and Acute liver injury  Source- ?    Source control Achieved by- see orders  - Merrem day 7 of 7 (completed on 1/3/23)  - Blood cx x2 (1/2/23) NGTD      Mild episode of recurrent major depressive disorder  Resume home meds.   - lyrica discontinued  - start gabapentin 300 mg TID      Amphetamine use disorder, severe  Continue to  educate and support      Spinal stenosis at L4-L5 level        Substance use disorder  Cessation advised. Follow up consult psychiatry.     Chronic hepatitis C with cirrhosis  Labs noted, will require follow up outpatient with GI to start treatment.       Cirrhosis of liver without ascites  Labs noted, stable.  AST   Date Value Ref Range Status   01/04/2023 40 10 - 40 U/L Final   01/03/2023 37 10 - 40 U/L Final   01/02/2023 37 10 - 40 U/L Final   01/01/2023 34 10 - 40 U/L Final   12/31/2022 34 10 - 40 U/L Final     ALT   Date Value Ref Range Status   01/04/2023 17 10 - 44 U/L Final   01/03/2023 16 10 - 44 U/L Final   01/02/2023 16 10 - 44 U/L Final   01/01/2023 16 10 - 44 U/L Final   12/31/2022 16 10 - 44 U/L Final        Alkaline Phosphatase   Date Value Ref Range Status   01/04/2023 130 55 - 135 U/L Final   01/03/2023 130 55 - 135 U/L Final   01/02/2023 136 (H) 55 - 135 U/L Final   01/01/2023 125 55 - 135 U/L Final   12/31/2022 126 55 - 135 U/L Final            UTI (urinary tract infection)    Component      Latest Ref Rng & Units 12/23/2022   Urine Culture, Routine       JACINTA ALBICANS (A) . . .   Suspect source of sepsis.  - Treat candida x 2 weeks given hx.      Final Active Diagnoses:    Diagnosis Date Noted POA    PRINCIPAL PROBLEM:  Severe sepsis [A41.9, R65.20] 12/23/2022 Yes    Pulmonary hypertension [I27.20] 12/24/2022 Yes    Murmur, cardiac [R01.1] 12/23/2022 Yes    Mild episode of recurrent major depressive disorder [F33.0] 02/09/2022 Yes     Chronic     Amphetamine use disorder, severe [F15.20] 04/15/2021 Yes    Cannabis use disorder, moderate, dependence [F12.20] 04/15/2021 Yes    Spinal stenosis at L4-L5 level [M48.061] 04/14/2021 Yes    Substance use disorder [F19.90] 03/15/2017 Yes    Chronic hepatitis C with cirrhosis [B18.2, K74.60] 01/08/2017 Yes    Cirrhosis of liver without ascites [K74.60] 12/06/2016 Yes    UTI (urinary tract infection) [N39.0] 03/04/2015 Yes      Problems Resolved During this Admission:    Diagnosis Date Noted Date Resolved POA    Pulmonary hyperinflation [R09.89] 12/24/2022 12/24/2022 Unknown       Discharged Condition: stable    Disposition: Home with outpatient follow up with PCP and GI specialists, PT and OT outpatient therapy eval and continued treatment     Follow Up:   Follow-up Information     Dannielle Merino, DO Follow up on 1/9/2023.    Specialty: Family Medicine  Why: 2:00pm.  Contact information:  [facility]   [address] [place] [address] [phone]             Gastroenterology Follow up in 1 week(s).    Why: [facility] patient with appointment.                     Patient Instructions:      Ambulatory referral/consult to Physical/Occupational Therapy   Standing Status: Future   Referral Priority: Routine Referral Type: Physical Medicine   Referral Reason: Specialty Services Required   Number of Visits Requested: 1       Significant Diagnostic Studies: Labs:   BMP:   Recent Labs   Lab 01/03/23  0512 01/04/23  0543   GLU 83 95    139   K 3.8 3.9    106   CO2 28 29   BUN 10 13   CREATININE 0.4* 0.4*   CALCIUM 7.8* 7.8*   MG 1.8 1.8   , CMP   Recent Labs   Lab 01/03/23  0512 01/04/23  0543    139   K 3.8 3.9    106   CO2 28 29   GLU 83 95   BUN 10 13   CREATININE 0.4* 0.4*   CALCIUM 7.8* 7.8*   PROT 6.8 6.9   ALBUMIN 1.8* 1.8*   BILITOT 1.5* 1.2*   ALKPHOS 130 130   AST 37 40   ALT 16 17   ANIONGAP 4* 4*   , CBC   Recent Labs   Lab 01/03/23  0512 01/04/23  0543    WBC 2.84* 2.60*   HGB 8.9* 8.8*   HCT 27.8* 27.3*   PLT 70* 63*   , INR   Lab Results   Component Value Date    INR 1.2 05/03/2022    INR 1.3 (H) 03/15/2022    INR 1.3 (H) 03/14/2022   , Lipid Panel   Lab Results   Component Value Date    CHOL 68 (L) 02/07/2022    HDL 27 (L) 02/07/2022    LDLCALC 31.0 (L) 02/07/2022    TRIG 50 02/07/2022    CHOLHDL 39.7 02/07/2022   , Troponin No results for input(s): TROPONINI in the last 168 hours. and A1C: No results for input(s): HGBA1C in the last 4320 hours.    Pending Diagnostic Studies:     None         Medications:  Reconciled Home Medications:      Medication List      START taking these medications    gabapentin 300 MG capsule  Commonly known as: NEURONTIN  Take 1 capsule (300 mg total) by mouth 3 (three) times daily.     lactulose 20 gram/30 mL Soln  Commonly known as: CHRONULAC  Take 30 mLs (20 g total) by mouth 3 (three) times daily.     methocarbamoL 500 MG Tab  Commonly known as: ROBAXIN  Take 1 tablet (500 mg total) by mouth 3 (three) times daily. for 10 days        CONTINUE taking these medications    albuterol 1.25 mg/3 mL Nebu  Commonly known as: ACCUNEB  Take 3 mLs (1.25 mg total) by nebulization every 6 (six) hours as needed (shortness of breath). Rescue     buPROPion 100 MG tablet  Commonly known as: WELLBUTRIN  Take 1 tablet (100 mg total) by mouth 2 (two) times daily.     folic acid 1 MG tablet  Commonly known as: FOLVITE  Take 1 tablet (1 mg total) by mouth once daily.     pantoprazole 40 MG tablet  Commonly known as: PROTONIX  Take 1 tablet (40 mg total) by mouth once daily.        STOP taking these medications    acetaminophen 325 MG tablet  Commonly known as: TYLENOL     amoxicillin 500 MG capsule  Commonly known as: AMOXIL     buprenorphine-naloxone 8-2 8-2 mg Subl  Commonly known as: SUBOXONE     ciprofloxacin HCl 500 MG tablet  Commonly known as: CIPRO     pregabalin 75 MG capsule  Commonly known as: LYRICA     sulfamethoxazole-trimethoprim  800-160mg 800-160 mg Tab  Commonly known as: BACTRIM DS            Indwelling Lines/Drains at time of discharge:   Lines/Drains/Airways     Central Venous Catheter Line  Duration           Percutaneous Central Line Insertion/Assessment - Triple Lumen  12/23/22 0904 left brachial 12 days                Time spent on the discharge of patient: 30 minutes    Dannielle Merino DO  Department of Hospital Medicine  [facility]

## 2023-01-04 NOTE — PLAN OF CARE
Problem: Occupational Therapy  Goal: Occupational Therapy Goal  Description: Goals to be met by: 01/02/22     Patient will increase functional independence with ADLs by performing:    UE Dressing with Set-up Assistance.  LE Dressing with Minimal Assistance.  Grooming while seated with Modified [place].  Toileting from toilet with Supervision for hygiene and clothing management.   Bathing from  shower chair/bench with Minimal Assistance.  Step transfer with Minimal Assistance  Toilet transfer to toilet with Minimal Assistance.  Increased functional strength to 4/5 for bilateral UE's.    Outcome: Ongoing, Progressing   Continue with POC.

## 2023-01-04 NOTE — ASSESSMENT & PLAN NOTE
Labs noted, stable.  AST   Date Value Ref Range Status   01/04/2023 40 10 - 40 U/L Final   01/03/2023 37 10 - 40 U/L Final   01/02/2023 37 10 - 40 U/L Final   01/01/2023 34 10 - 40 U/L Final   12/31/2022 34 10 - 40 U/L Final     ALT   Date Value Ref Range Status   01/04/2023 17 10 - 44 U/L Final   01/03/2023 16 10 - 44 U/L Final   01/02/2023 16 10 - 44 U/L Final   01/01/2023 16 10 - 44 U/L Final   12/31/2022 16 10 - 44 U/L Final        Alkaline Phosphatase   Date Value Ref Range Status   01/04/2023 130 55 - 135 U/L Final   01/03/2023 130 55 - 135 U/L Final   01/02/2023 136 (H) 55 - 135 U/L Final   01/01/2023 125 55 - 135 U/L Final   12/31/2022 126 55 - 135 U/L Final

## 2023-01-04 NOTE — PLAN OF CARE
Case management went into the room to discuss patient's discharge. Patient continues to state mobility issues.     Did discuss potential nursing home placement with her - patient was visibly upset. Attempted to reach Dr. Merino - no answer.     Upon leaving patients room, patient was reaching out to her significant other.

## 2023-01-04 NOTE — PLAN OF CARE
[place] - Med Surg  Discharge Final Note    Primary Care Provider: Dannielle Merino DO    Expected Discharge Date:     Final Discharge Note (most recent)       Final Note - 01/04/23 0834          Final Note    Assessment Type Final Discharge Note     Anticipated Discharge Disposition Home or Self Care     Hospital Resources/Appts/Education Provided Appointments scheduled and added to AVS        Post-Acute Status    Post-Acute Authorization Other     Other Status No Post-Acute Service Needs     Discharge Delays None known at this time                     Important Message from Medicare             Contact Info       aDnnielle Merino DO   Specialty: Family Medicine   Relationship: PCP - General    [facility]  [address] [place] [address]   Phone: [phone]       Next Steps: Follow up on 1/9/2023    Instructions: 2:00pm.    Gastroenterolgy        Next Steps: Follow up in 1 week(s)    Instructions: [facility] with appointment.          Spoke to Maggy with [facility]. She sent a message to the GI clinic to call patient with a follow up date and time.     Referral for outpatient PT/OT faxed to [facility].

## 2023-01-04 NOTE — SUBJECTIVE & OBJECTIVE
Interval History: Patient seen and examined.     Review of Systems   Constitutional:  Negative for activity change, appetite change, chills, diaphoresis, fatigue and fever.   HENT:  Negative for sore throat.    Eyes:  Negative for pain.   Respiratory:  Negative for cough, choking, chest tightness and shortness of breath.    Cardiovascular:  Negative for chest pain, palpitations and leg swelling.   Gastrointestinal:  Negative for abdominal pain, constipation, diarrhea, nausea, rectal pain and vomiting.   Genitourinary:  Negative for dyspareunia, dysuria, flank pain, frequency, genital sores, hematuria and menstrual problem.   Musculoskeletal:  Positive for arthralgias, back pain, gait problem, myalgias and neck pain.   Skin:  Negative for pallor, rash and wound.   Neurological:  Positive for weakness. Negative for dizziness, tremors, speech difficulty and headaches.   Psychiatric/Behavioral:  Negative for agitation, behavioral problems, confusion, decreased concentration and dysphoric mood.    Objective:     Vital Signs (Most Recent):  Temp: 98.4 °F (36.9 °C) (01/04/23 1107)  Pulse: 96 (01/04/23 1107)  Resp: 19 (01/04/23 1238)  BP: (!) 120/56 (01/04/23 1107)  SpO2: 97 % (01/04/23 1107)   Vital Signs (24h Range):  Temp:  [98.2 °F (36.8 °C)-98.5 °F (36.9 °C)] 98.4 °F (36.9 °C)  Pulse:  [] 96  Resp:  [18-20] 19  SpO2:  [95 %-98 %] 97 %  BP: (100-128)/(56-72) 120/56     Weight: 97.5 kg (215 lb)  Body mass index is 33.67 kg/m².    Intake/Output Summary (Last 24 hours) at 1/4/2023 1329  Last data filed at 1/4/2023 0651  Gross per 24 hour   Intake 1545 ml   Output 1000 ml   Net 545 ml      Physical Exam  Vitals and nursing note reviewed.   Constitutional:       General: She is not in acute distress.     Appearance: She is obese. She is ill-appearing.   HENT:      Head: Normocephalic and atraumatic.      Right Ear: External ear normal.      Left Ear: External ear normal.      Nose: Nose normal. No congestion or  rhinorrhea.      Mouth/Throat:      Mouth: Mucous membranes are dry.      Pharynx: No oropharyngeal exudate.   Eyes:      General: No scleral icterus.     Extraocular Movements: Extraocular movements intact.      Conjunctiva/sclera: Conjunctivae normal.   Neck:      Vascular: No carotid bruit.      Comments: Limited ROM at baseline  Cardiovascular:      Rate and Rhythm: Normal rate and regular rhythm.      Heart sounds: Murmur heard.     No friction rub. No gallop.   Pulmonary:      Effort: No respiratory distress.      Breath sounds: No stridor. No wheezing, rhonchi or rales.   Chest:      Chest wall: No tenderness.   Abdominal:      General: There is no distension.      Palpations: Abdomen is soft. There is no mass.      Tenderness: There is no abdominal tenderness. There is no right CVA tenderness, left CVA tenderness or guarding.   Musculoskeletal:         General: No swelling, tenderness or deformity.      Cervical back: Normal range of motion and neck supple. No rigidity or tenderness.      Right lower leg: Edema present.      Left lower leg: Edema present.   Lymphadenopathy:      Cervical: No cervical adenopathy.   Skin:     Capillary Refill: Capillary refill takes less than 2 seconds.      Coloration: Skin is not jaundiced or pale.   Neurological:      Mental Status: She is oriented to person, place, and time. Mental status is at baseline.      Cranial Nerves: No cranial nerve deficit.      Sensory: Sensory deficit present.      Motor: Weakness present.   Psychiatric:         Mood and Affect: Mood normal.         Behavior: Behavior normal.      Comments: Calm, cooperative, moving all extremities.     Significant Labs: All pertinent labs within the past 24 hours have been reviewed.  BMP:   Recent Labs   Lab 01/04/23  0543   GLU 95      K 3.9      CO2 29   BUN 13   CREATININE 0.4*   CALCIUM 7.8*   MG 1.8     CBC:   Recent Labs   Lab 01/03/23  0512 01/04/23  0543   WBC 2.84* 2.60*   HGB 8.9* 8.8*    HCT 27.8* 27.3*   PLT 70* 63*     CMP:   Recent Labs   Lab 01/03/23  0512 01/04/23  0543    139   K 3.8 3.9    106   CO2 28 29   GLU 83 95   BUN 10 13   CREATININE 0.4* 0.4*   CALCIUM 7.8* 7.8*   PROT 6.8 6.9   ALBUMIN 1.8* 1.8*   BILITOT 1.5* 1.2*   ALKPHOS 130 130   AST 37 40   ALT 16 17   ANIONGAP 4* 4*     Magnesium:   Recent Labs   Lab 01/03/23  0512 01/04/23  0543   MG 1.8 1.8       Significant Imaging: I have reviewed all pertinent imaging results/findings within the past 24 hours.

## 2023-01-04 NOTE — PT/OT/SLP PROGRESS
Occupational Therapy   Treatment    Name: Rachel Zazueta  MRN: [id number]  Admitting Diagnosis:  Severe sepsis       Recommendations:     Discharge Recommendations: other (see comments) (TBD)  Discharge Equipment Recommendations:  other (see comments) (TBD)  Barriers to discharge:  Inaccessible home environment, Other (Comment) (medical and fuctional status)    Assessment:     Rachel Zazueta is a 42 y.o. female with a medical diagnosis of Severe sepsis.  She presents with good participation. Performance deficits affecting function are weakness, impaired endurance, impaired self care skills, impaired functional mobility, decreased upper extremity function, decreased lower extremity function, decreased safety awareness, pain.     Rehab Prognosis:  Fair; patient would benefit from acute skilled OT services to address these deficits and reach maximum level of function.       Plan:     Patient to be seen 5 x/week to address the above listed problems via self-care/home management, therapeutic activities, therapeutic exercises  Plan of Care Expires: 01/20/23  Plan of Care Reviewed with: patient    Subjective     Pain/Comfort:  Pain Rating 1: 5/10  Location - Side 1: Bilateral  Location - Orientation 1: lower  Location 1: back  Pain Addressed 1: Pre-medicate for activity, Reposition, Cessation of Activity  Pain Rating Post-Intervention 1: 7/10    Objective:     Communicated with: RN and OT prior to session.  Patient found supine with PureWick, PICC line, telemetry upon OT entry to room.    General Precautions: Standard, fall, contact    Orthopedic Precautions:N/A  Braces: N/A  Respiratory Status: Room air     Occupational Performance:     Bed Mobility:    Patient completed Rolling/Turning to Left with  minimum assistance  Patient completed Rolling/Turning to Right with minimum assistance  Patient completed Scooting/Bridging with moderate assistance     Activities of Daily Living:  Grooming: modified independence post  set-up      [facility] 6 Click ADL: 16    Treatment & Education:  Pt agreeable to participate stated she had her pain meds not long ago. Pt anxious about D/C stating she cant even get inside home. RN in room when she was voicing concerns and said RN would contact . Pt rolled L and R with min A to move R hip/LE also using bed rails. Pt c/o  bilateral lower back pain with rolling. Pt then scooted to head of bed using (B) bed rails and mod A to help L hip move. Pt needed min verbal cues on energy conservation during bed mobility task. Pt then completed grooming task such as brushing hair and teeth supine in bed with HOB elevated with mod I post set-up. Pt then completed therapeutic exercise supine in bed with HOB elevated in the following planes: shoulder flexion/extension, shoulder abduction, elbow extension/flexion, and composite fist x 10 each with rest breaks b/w each set. Pt educated on the importance of continuing exercise after discharge.     Patient left HOB elevated with all lines intact and call button in reach    GOALS:   Multidisciplinary Problems       Occupational Therapy Goals          Problem: Occupational Therapy    Goal Priority Disciplines Outcome Interventions   Occupational Therapy Goal     OT, PT/OT Ongoing, Progressing    Description: Goals to be met by: 01/02/22     Patient will increase functional independence with ADLs by performing:    UE Dressing with Set-up Assistance.  LE Dressing with Minimal Assistance.  Grooming while seated with Modified [place].  Toileting from toilet with Supervision for hygiene and clothing management.   Bathing from  shower chair/bench with Minimal Assistance.  Step transfer with Minimal Assistance  Toilet transfer to toilet with Minimal Assistance.  Increased functional strength to 4/5 for bilateral UE's.                         Time Tracking:     OT Date of Treatment: 01/04/23  OT Start Time: 1357  OT Stop Time: 1423  OT Total Time (min): 26  min    Billable Minutes:Self Care/Home Management 16  Therapeutic Exercise 10    OT/ROSS: ROSS     ROSS Visit Number: 3    1/4/2023

## 2023-01-05 PROBLEM — R53.81 DEBILITY: Status: ACTIVE | Noted: 2023-01-05

## 2023-01-05 PROCEDURE — 99232 PR SUBSEQUENT HOSPITAL CARE,LEVL II: ICD-10-PCS | Mod: ,,, | Performed by: STUDENT IN AN ORGANIZED HEALTH CARE EDUCATION/TRAINING PROGRAM

## 2023-01-05 PROCEDURE — 25000003 PHARM REV CODE 250: Performed by: INTERNAL MEDICINE

## 2023-01-05 PROCEDURE — 97110 THERAPEUTIC EXERCISES: CPT

## 2023-01-05 PROCEDURE — 97110 THERAPEUTIC EXERCISES: CPT | Mod: CO

## 2023-01-05 PROCEDURE — 97530 THERAPEUTIC ACTIVITIES: CPT | Mod: CO

## 2023-01-05 PROCEDURE — 97530 THERAPEUTIC ACTIVITIES: CPT

## 2023-01-05 PROCEDURE — 25000003 PHARM REV CODE 250: Performed by: PSYCHIATRY & NEUROLOGY

## 2023-01-05 PROCEDURE — 99232 SBSQ HOSP IP/OBS MODERATE 35: CPT | Mod: ,,, | Performed by: STUDENT IN AN ORGANIZED HEALTH CARE EDUCATION/TRAINING PROGRAM

## 2023-01-05 PROCEDURE — 27000207 HC ISOLATION

## 2023-01-05 PROCEDURE — 97535 SELF CARE MNGMENT TRAINING: CPT | Mod: CO

## 2023-01-05 PROCEDURE — 11000001 HC ACUTE MED/SURG PRIVATE ROOM

## 2023-01-05 RX ADMIN — METHOCARBAMOL TABLETS 500 MG: 500 TABLET, COATED ORAL at 05:01

## 2023-01-05 RX ADMIN — GABAPENTIN 300 MG: 300 CAPSULE ORAL at 09:01

## 2023-01-05 RX ADMIN — LACTULOSE 20 G: 20 SOLUTION ORAL at 08:01

## 2023-01-05 RX ADMIN — METHOCARBAMOL TABLETS 500 MG: 500 TABLET, COATED ORAL at 02:01

## 2023-01-05 RX ADMIN — BUPROPION HYDROCHLORIDE 100 MG: 100 TABLET, FILM COATED ORAL at 08:01

## 2023-01-05 RX ADMIN — METHOCARBAMOL TABLETS 500 MG: 500 TABLET, COATED ORAL at 09:01

## 2023-01-05 RX ADMIN — ALPRAZOLAM 0.5 MG: 0.5 TABLET ORAL at 02:01

## 2023-01-05 RX ADMIN — BUPROPION HYDROCHLORIDE 100 MG: 100 TABLET, FILM COATED ORAL at 09:01

## 2023-01-05 RX ADMIN — GABAPENTIN 300 MG: 300 CAPSULE ORAL at 02:01

## 2023-01-05 RX ADMIN — POLYETHYLENE GLYCOL (3350) 17 G: 17 POWDER, FOR SOLUTION ORAL at 09:01

## 2023-01-05 RX ADMIN — METHOCARBAMOL TABLETS 500 MG: 500 TABLET, COATED ORAL at 08:01

## 2023-01-05 RX ADMIN — HYDROCODONE BITARTRATE AND ACETAMINOPHEN 1 TABLET: 5; 325 TABLET ORAL at 01:01

## 2023-01-05 RX ADMIN — GABAPENTIN 300 MG: 300 CAPSULE ORAL at 08:01

## 2023-01-05 RX ADMIN — HYDROCODONE BITARTRATE AND ACETAMINOPHEN 1 TABLET: 5; 325 TABLET ORAL at 08:01

## 2023-01-05 RX ADMIN — PANTOPRAZOLE SODIUM 40 MG: 40 TABLET, DELAYED RELEASE ORAL at 09:01

## 2023-01-05 RX ADMIN — ALPRAZOLAM 0.5 MG: 0.5 TABLET ORAL at 08:01

## 2023-01-05 RX ADMIN — LACTULOSE 20 G: 20 SOLUTION ORAL at 09:01

## 2023-01-05 NOTE — PLAN OF CARE
Problem: Physical Therapy  Goal: Physical Therapy Goal  Description: Goals to be met by: 2023     Patient will increase functional independence with mobility by performin. Supine to sit with Modified [place]  2. Sit to supine with Modified [place]  3. Sit to stand transfer with Stand-by Assistance  4. Bed to chair transfer with Stand-by Assistance using Rolling Walker  5. Gait  x 50 feet with Contact Guard Assistance using Rolling Walker.   6. Sitting at edge of bed x10 minutes with [place] to perform (B) LE therex      Outcome: Ongoing, Progressing

## 2023-01-05 NOTE — PT/OT/SLP PROGRESS
Physical Therapy Treatment    Patient Name:  Rachel Zazueta   MRN:  [id number]    Recommendations:     Discharge Recommendations:  (TBD)  Discharge Equipment Recommendations:  (TBD)  Barriers to discharge:  Patient fear avoidance and decreased ability for significant other to care for patient    Assessment:     Rachel Zazueta is a 42 y.o. female admitted with a medical diagnosis of Severe sepsis.  She presents with the following impairments/functional limitations: weakness, impaired endurance, impaired self care skills, impaired functional mobility, decreased lower extremity function. Patient was able to perform supine to sit this visit with Max A and minimal ability to perform LE functional mobility. Patient was unable to perform sit to stand with multiple attempts. She reports weakness in B LE and inability to stand with assistance from PT and standard walker.     Rehab Prognosis: Good; patient would benefit from acute skilled PT services to address these deficits and reach maximum level of function.    Recent Surgery: * No surgery found *      Plan:     During this hospitalization, patient to be seen 5 x/week to address the identified rehab impairments via gait training, therapeutic activities, therapeutic exercises and progress toward the following goals:    Plan of Care Expires:  01/09/23    Subjective     Chief Complaint: Weakness in B LE and inability to perform functional activities   Patient/Family Comments/goals: Return to home and PLOF  Pain/Comfort:  Pain Rating 1: 5/10  Location 1: back  Pain Addressed 1: Reposition  Pain Rating Post-Intervention 1: 6/10      Objective:     Communicated with  patient and nurse  prior to session.  Patient found HOB elevated with PureWick upon PT entry to room.     General Precautions: Standard, fall, contact  Orthopedic Precautions: N/A  Braces: N/A  Respiratory Status: Room air     Functional Mobility:  Bed Mobility:     Scooting: maximal assistance  Supine to Sit:  maximal assistance  Sit to Supine: maximal assistance  Transfers:     Sit to Stand:  unable to perform with multiple attempts and Mod A with standard walker  Gait: Unable to perform or attempt      AM-PAC 6 CLICK MOBILITY  Turning over in bed (including adjusting bedclothes, sheets and blankets)?: 2  Sitting down on and standing up from a chair with arms (e.g., wheelchair, bedside commode, etc.): 1  Moving from lying on back to sitting on the side of the bed?: 2  Moving to and from a bed to a chair (including a wheelchair)?: 1  Need to walk in hospital room?: 1  Climbing 3-5 steps with a railing?: 1  Basic Mobility Total Score: 8       Treatment & Education:  Bed mobility, seated EOB while performing 2 sets LAQ with no assistance required. Attempted sit<>stand multiple times without being able to achieve full standing position/ Patient would collapse into seated position on EOB with standing attempts and assistance from PT. Patient required Max A for supine to sit and scooting required legs of bed elevated for patient use B UE to pull herself up into bed.     Patient left HOB elevated with  Nurse aid helping patient onto bed pan and nurse was notified..    GOALS:   Multidisciplinary Problems       Physical Therapy Goals          Problem: Physical Therapy    Goal Priority Disciplines Outcome Goal Variances Interventions   Physical Therapy Goal     PT, PT/OT Ongoing, Progressing     Description: Goals to be met by: 2023     Patient will increase functional independence with mobility by performin. Supine to sit with Modified [place]  2. Sit to supine with Modified [place]  3. Sit to stand transfer with Stand-by Assistance  4. Bed to chair transfer with Stand-by Assistance using Rolling Walker  5. Gait  x 50 feet with Contact Guard Assistance using Rolling Walker.   6. Sitting at edge of bed x10 minutes with [place] to perform (B) LE therex                           Time Tracking:     PT  Received On: 01/05/23  PT Start Time: 0930     PT Stop Time: 0955  PT Total Time (min): 25 min     Billable Minutes: Therapeutic Activity 15 minutes and Therapeutic Exercise 10 minutes    Treatment Type: Treatment  PT/PTA: PT           01/05/2023

## 2023-01-05 NOTE — PLAN OF CARE
Problem: Occupational Therapy  Goal: Occupational Therapy Goal  Description: Goals to be met by: 01/02/22     Patient will increase functional independence with ADLs by performing:    UE Dressing with Set-up Assistance.  LE Dressing with Minimal Assistance.  Grooming while seated with Modified [place].  Toileting from toilet with Supervision for hygiene and clothing management.   Bathing from  shower chair/bench with Minimal Assistance.  Step transfer with Minimal Assistance  Toilet transfer to toilet with Minimal Assistance.  Increased functional strength to 4/5 for bilateral UE's.    Outcome: Ongoing, Progressing   Continue with POC.   Family Medicine Clinic: Medicare Wellness Visit Plus  Chief Complaint   Patient presents with   • Medicare Wellness Visit     HISTORY OF PRESENT ILLNESS   Overview  Graph        Jagdish Ayala is a 61 year old male, today we discussed the following:      A1c 7.5, last 7.1 11/2021. Pt does not want pharmacotherapy. Would like to try work on the diet and exercise. Pt walks for exercise, daily about 20-25 minutes. He does not want to see nutritional therapist.     Troubles sleeping lately. Melatonin used to work well for him. Would like to check with his insurance first, he has mail delivery. Does not want me to send an rx today.      Continues to smoke. Does not want to try pharmacotherapy or breath 2 study.     Stopped effexor as he does not feel depressed. NO SI or HI. Has been doing well considering his mom's recent death.     Taking vitamin D, not sure how much.     Declines colonoscopy. Will schedule fu with his eye doctor.     Link to History   Social History Narrative    (none)    Alcohol, Tobacco and other use:  Tobacco Use: Cigarette Nicotine Dependence Without Complication  Alcohol Use: Alcoholic Intoxication Without Complication ([facility])  Illicit Drug Use: No    Past Histories: I have reviewed and updated the pertinent past medical histories as above and as documented by clinical staff in the medical record.  History     Last reviewed in this visit by Beverley Ingram MA on 5/10/2022 at  4:27 PM    Sections Reviewed    Tobacco, Family, Surgical, Medical        PHYSICAL EXAM     Vital Signs: Blood pressure (!) 142/80, pulse 75, temperature 98.7 °F (37.1 °C), temperature source Oral, resp. rate 16, height 5' 7\" (1.702 m), weight 96.7 kg (213 lb 3 oz), SpO2 97 %.  General Appearance:  Alert, cooperative, no distress, appears stated age.  Head:  Normocephalic, without obvious abnormality, atraumatic.  Eyes:  EOMI.  Ears:  Hearing normal to spoken voice.   Neck:  Supple, symmetrical, trachea midline, no  adenopathy.  Thyroid has no enlargement/tenderness/nodules.  Back:  Symmetric, no curvature, range of motion normal, no costovertebral angle tenderness.  Lungs:  Clear to auscultation bilaterally, respirations unlabored.  Heart:  Regular rate and rhythm, S1 and S2 normal, no murmur, rub or gallop.  Abdomen:  Soft, non-tender, bowel sounds active all four quadrants,  no masses, no organomegaly.  Extremities:  Atraumatic, no cyanosis or edema.  Pulses:  2+ and symmetric all extremities.  Skin:  Skin color, texture, turgor normal, no rashes or lesions.  Lymph Nodes:  Cervical, supraclavicular nodes normal.  Neurologic:  No focal deficits.     ASSESSMENT AND PLAN   DDx  Problem List  AVS      Jagdish Ayala is a pleasant 61 year old male we discussed the followin. Type 2 diabetes mellitus with retinopathy and macular edema, without long-term current use of insulin, unspecified laterality, unspecified retinopathy severity (CMS/HCC)  A1c 7.5 from . Declines pharmacotherapy and nutritional therapy. No neuropathy. Stable CKD2.  -     POCT GLYCOHEMOGLOBIN ANALYZER  -     Discussed healthy lifestyle modifications  -     Discussed risks of untreated diabetes in detail  -     Fu in 3 months  -     Pt will schedule fu with ophthalmology  2. Alcohol use, unspecified with intoxication, uncomplicated (CMS/HCC)  Not ready to quit.   3. Cigarette nicotine dependence without complication  Not ready to quit.   4. Benign essential hypertension  Not at goal. Compliant.  -     INCREASE lisinopril (ZESTRIL) 40 MG tablet; Take 1 tablet by mouth daily.  Dispense: 30 tablet; Refill: 1  -     Fu in 2-4 weeks  5. Closed displaced fracture of right femoral neck (CMS/HCC)  6. Vitamin D deficiency  Previously ordered dexa scan not completed. Takes vitamin D but unsure how much.   -     VITAMIN D -25 HYDROXY; Future  -     BD DEXA AXIAL SKELETON; Future  7. Medicare annual wellness visit, subsequent  - health maintenance discussed and  pertinent positives addressed    Primary Care Team: Dr. Lauren Rothman  Medicare Health Risk Assessment   6 c.) How many servings of Fried or High Fat Foods do you have each day (1 serving = 1 [place], French Fries, chips, doughnut, fried chicken/fish): 2 per day     11c.) Teeth or Denture Problems: Often       Vision and hearing screens documented - see epic.  Advance Directives: on file  Cognitive Assessment: No evidence of cognitive dysfunction by direct observation and MOCA testing documented in epic.    Link to Health Maintenance    Follow Up   Return in about 2 weeks (around 5/24/2022) for fu BP.    Upcoming [facility] Appts  No future appointments.    Lauren Rothman MD Family Medicine  AVS

## 2023-01-05 NOTE — PT/OT/SLP PROGRESS
Occupational Therapy   Treatment    Name: Rachel Zazueta  MRN: [id number]  Admitting Diagnosis:  Severe sepsis       Recommendations:     Discharge Recommendations: other (see comments) (TBD)  Discharge Equipment Recommendations:  other (see comments) (TBD)  Barriers to discharge:  Inaccessible home environment, Other (Comment) (medical and functional status)    Assessment:     Rachel Zazueta is a 42 y.o. female with a medical diagnosis of Severe sepsis.  She presents with good participation. Performance deficits affecting function are weakness, impaired endurance, impaired self care skills, impaired functional mobility, decreased safety awareness, pain, decreased lower extremity function.     Rehab Prognosis:  Good; patient would benefit from acute skilled OT services to address these deficits and reach maximum level of function.       Plan:     Patient to be seen 5 x/week to address the above listed problems via self-care/home management, therapeutic activities, therapeutic exercises  Plan of Care Expires: 01/20/23  Plan of Care Reviewed with: patient    Subjective     Pain/Comfort:  Pain Rating 1: 5/10  Location - Side 1: Bilateral  Location - Orientation 1: lower  Location 1: back  Pain Addressed 1: Reposition, Cessation of Activity, Nurse notified  Pain Rating Post-Intervention 1: 7/10    Objective:     Communicated with: Nursing and OT prior to session.  Patient found  L side lying as nursing was changing soiled diaper  upon OT entry to room.    General Precautions: Standard, contact, fall    Orthopedic Precautions:N/A  Braces: N/A  Respiratory Status: Room air     Occupational Performance:     Bed Mobility:    Patient completed Rolling/Turning to Left with  minimum assistance  Patient completed Rolling/Turning to Right with minimum assistance  Patient completed Scooting/Bridging with moderate assistance  Patient completed Supine to Sit with moderate assistance  Patient completed Sit to Supine with  moderate assistance     Activities of Daily Living:  Grooming: modified independence post set-up      [facility] 6 Click ADL: 16    Treatment & Education:  Upon COTTON entry, pt side lying with nursing changing diaper. Pt needing min A to roll to L and R and max A for tian-care completed by nursing staff. Pt agreeable to sit on side of bed. Pt completed bed mobility with min A for rolling and mod A for scooting and sitting EOB. Pt reported slight dizziness upon sitting up. Pt reported after sitting a few minutes dizziness didn't reside so COTTON instructed to lay back in bed for safety. Pt needed mod A to transfer in back to supine with A to put legs in bed. Pt then scooted to HOB with mod A. Pt completed grooming task supine in bed with HOB elevated with mod I post set-up. Pt then completed theraputic exercsie x 20 in the following planes: shoulder flexion/extension, shoulder abduction, elbow flexion/extension, and composite fist with min verbal cues and min rest breaks b/w sets. Pt requested pain meds post session, RN notified.     Patient left HOB elevated with all lines intact and call button in reach    GOALS:   Multidisciplinary Problems       Occupational Therapy Goals          Problem: Occupational Therapy    Goal Priority Disciplines Outcome Interventions   Occupational Therapy Goal     OT, PT/OT Ongoing, Progressing    Description: Goals to be met by: 01/02/22     Patient will increase functional independence with ADLs by performing:    UE Dressing with Set-up Assistance.  LE Dressing with Minimal Assistance.  Grooming while seated with Modified [place].  Toileting from toilet with Supervision for hygiene and clothing management.   Bathing from  shower chair/bench with Minimal Assistance.  Step transfer with Minimal Assistance  Toilet transfer to toilet with Minimal Assistance.  Increased functional strength to 4/5 for bilateral UE's.                         Time Tracking:     OT Date of Treatment:  01/05/23  OT Start Time: 1021  OT Stop Time: 1108  OT Total Time (min): 47 min    Billable Minutes:Self Care/Home Management 20 min  Therapeutic Activity 12 min  Therapeutic Exercise 15 min    OT/ROSS: ROSS HAWKINS Visit Number: 4    1/5/2023

## 2023-01-05 NOTE — PLAN OF CARE
LOCET is completed. Form was sent to the Office of Aging and Adult Services for review. Will continue to monitor.

## 2023-01-05 NOTE — NURSING
While making rounds, patient found splayed out in bed reportedly trying see if she could sit up on the side of the bed by herself. She was very weak and unable to move self to even sit up. Once assisted to the seated position she reported that she needed to have a BM and was informed that we could try the bedside toilet. Patient reported that she uses a walker to get around. While attempting to just have patient stand her legs got weak and she started to go down. Her knees were blocked by mine and I was able to guide her back to the bed. Pt states she is trying as hard as she can but ban barely move her own weight. Dr Merino notified of patient status. Boyfriend arrived and reassessed the home situation. Patient has no ramp or wheelchair and boyfriend has no assistance to get her even out of his vehicle. Appears to need either a SNF or IPR. Dr Merino stated we can't in good conscience discharge this patient home in this current state. Discharge cancelled and will re-evaluate options in the AM with Case Management.

## 2023-01-05 NOTE — PLAN OF CARE
01/05/23 1015   Post-Acute Status   Post-Acute Authorization Placement   Post-Acute Placement Status Referrals Sent   Coverage MEDICAID - [employer] [facility]   Discharge Delays None known at this time   Discharge Plan   Discharge Plan A Skilled Nursing Facility   Discharge Plan B Other     New referral. Spoke to the patient in her room regarding her discharge plan of care. The patient was open to SNF placement. I spoke to the patient about submitting to a facility within a 50 mile radius, and she agreed. Several referrals were sent via [employer]. CM/SW will continue to monitor.

## 2023-01-06 PROCEDURE — 25000003 PHARM REV CODE 250: Performed by: INTERNAL MEDICINE

## 2023-01-06 PROCEDURE — 99231 PR SUBSEQUENT HOSPITAL CARE,LEVL I: ICD-10-PCS | Mod: ,,, | Performed by: STUDENT IN AN ORGANIZED HEALTH CARE EDUCATION/TRAINING PROGRAM

## 2023-01-06 PROCEDURE — 11000001 HC ACUTE MED/SURG PRIVATE ROOM

## 2023-01-06 PROCEDURE — 97110 THERAPEUTIC EXERCISES: CPT | Mod: CO

## 2023-01-06 PROCEDURE — 25000003 PHARM REV CODE 250: Performed by: PSYCHIATRY & NEUROLOGY

## 2023-01-06 PROCEDURE — 99231 SBSQ HOSP IP/OBS SF/LOW 25: CPT | Mod: ,,, | Performed by: STUDENT IN AN ORGANIZED HEALTH CARE EDUCATION/TRAINING PROGRAM

## 2023-01-06 PROCEDURE — 97535 SELF CARE MNGMENT TRAINING: CPT | Mod: CO

## 2023-01-06 PROCEDURE — 27000207 HC ISOLATION

## 2023-01-06 RX ADMIN — METHOCARBAMOL TABLETS 500 MG: 500 TABLET, COATED ORAL at 08:01

## 2023-01-06 RX ADMIN — GABAPENTIN 300 MG: 300 CAPSULE ORAL at 10:01

## 2023-01-06 RX ADMIN — METHOCARBAMOL TABLETS 500 MG: 500 TABLET, COATED ORAL at 10:01

## 2023-01-06 RX ADMIN — HYDROCODONE BITARTRATE AND ACETAMINOPHEN 1 TABLET: 5; 325 TABLET ORAL at 05:01

## 2023-01-06 RX ADMIN — METHOCARBAMOL TABLETS 500 MG: 500 TABLET, COATED ORAL at 01:01

## 2023-01-06 RX ADMIN — ALPRAZOLAM 0.5 MG: 0.5 TABLET ORAL at 10:01

## 2023-01-06 RX ADMIN — HYDROCODONE BITARTRATE AND ACETAMINOPHEN 1 TABLET: 5; 325 TABLET ORAL at 10:01

## 2023-01-06 RX ADMIN — POLYETHYLENE GLYCOL (3350) 17 G: 17 POWDER, FOR SOLUTION ORAL at 10:01

## 2023-01-06 RX ADMIN — BUPROPION HYDROCHLORIDE 100 MG: 100 TABLET, FILM COATED ORAL at 08:01

## 2023-01-06 RX ADMIN — GABAPENTIN 300 MG: 300 CAPSULE ORAL at 08:01

## 2023-01-06 RX ADMIN — PANTOPRAZOLE SODIUM 40 MG: 40 TABLET, DELAYED RELEASE ORAL at 10:01

## 2023-01-06 RX ADMIN — BUPROPION HYDROCHLORIDE 100 MG: 100 TABLET, FILM COATED ORAL at 10:01

## 2023-01-06 RX ADMIN — METHOCARBAMOL TABLETS 500 MG: 500 TABLET, COATED ORAL at 04:01

## 2023-01-06 RX ADMIN — ALPRAZOLAM 0.5 MG: 0.5 TABLET ORAL at 08:01

## 2023-01-06 RX ADMIN — GABAPENTIN 300 MG: 300 CAPSULE ORAL at 03:01

## 2023-01-06 NOTE — PT/OT/SLP PROGRESS
Occupational Therapy   Treatment    Name: Rachel Zazueta  MRN: [id number]  Admitting Diagnosis:  Debility       Recommendations:     Discharge Recommendations: other (see comments) (TBD)  Discharge Equipment Recommendations:  other (see comments) (TBD)  Barriers to discharge:  Inaccessible home environment, Other (Comment) (functional and medical status)    Assessment:     Rachel Zazueta is a 42 y.o. female with a medical diagnosis of Debility.  She presents with fair participation. Performance deficits affecting function are weakness, impaired endurance, impaired self care skills, impaired functional mobility, gait instability, impaired balance, decreased safety awareness, pain.     Rehab Prognosis:  Fair; patient would benefit from acute skilled OT services to address these deficits and reach maximum level of function.       Plan:     Patient to be seen 5 x/week to address the above listed problems via self-care/home management, therapeutic activities, therapeutic exercises  Plan of Care Expires: 01/09/23  Plan of Care Reviewed with: patient    Subjective     Pain/Comfort:  Pain Rating 1: 6/10  Location - Side 1: Bilateral  Location - Orientation 1: lower  Location 1: back  Pain Addressed 1: Reposition, Cessation of Activity  Pain Rating Post-Intervention 1:  (did not rate)    Objective:     Communicated with: OT and nursing prior to session.  Patient found HOB elevated with PureWick upon OT entry to room.    General Precautions: Standard, fall, contact    Orthopedic Precautions:N/A  Braces: N/A  Respiratory Status: Room air     Occupational Performance:     Activities of Daily Living:  Grooming: modified independence post set-up      [facility] 6 Click ADL: 16    Treatment & Education:  Upon entering pt was supine in bed with HOB elevated. Pt c/o muscle spasms refusing to attempt to sit EOB. Pt completed grooming task supine in bed with HOB elevated completing oral hygiene and brushing hair with mod I post  set-up. Pt then completed therapeutic exercise supine in bed with HOB elevated 2 x 15 in the following planes: shoulder flexion/extension, shoulder abduction, elbow flexion/extension, and composite fist. Pt c/o slight SOB, pt said she would tell nurse when she goes giver her meds in a few minutes.    Patient left HOB elevated with all lines intact and call button in reach    GOALS:   Multidisciplinary Problems       Occupational Therapy Goals          Problem: Occupational Therapy    Goal Priority Disciplines Outcome Interventions   Occupational Therapy Goal     OT, PT/OT Ongoing, Progressing    Description: Goals to be met by: 01/02/22     Patient will increase functional independence with ADLs by performing:    UE Dressing with Set-up Assistance.  LE Dressing with Minimal Assistance.  Grooming while seated with Modified [place].  Toileting from toilet with Supervision for hygiene and clothing management.   Bathing from  shower chair/bench with Minimal Assistance.  Step transfer with Minimal Assistance  Toilet transfer to toilet with Minimal Assistance.  Increased functional strength to 4/5 for bilateral UE's.                         Time Tracking:     OT Date of Treatment: 01/09/23  OT Start Time: 1421  OT Stop Time: 1449  OT Total Time (min): 28 min    Billable Minutes:Self Care/Home Management 10  Therapeutic Exercise 18    OT/ROSS: ROSS     ROSS Visit Number: 5    1/6/2023

## 2023-01-06 NOTE — PROGRESS NOTES
"[facility]  Progress Note    Patient Name: Rachel Zazueta  MRN: [id number]  Patient Class: IP- Inpatient   Admission Date: 12/23/2022  Length of Stay: 13 days  Attending Physician: Dannielle Merino DO  Primary Care Provider: Dannielle Merino DO        Subjective:     Principal Problem:Debility        HPI:  ER HPI:  42-year-old female with a history of anemia, anxiety, recurrent cellulitis, hepatitis C, IVDU, liver cirrhosis, cholecystectomy, kidney stones, lumbar fusion, shoulder surgery, chronic back pain comes in c/o generalized weakness, fatigue, and bilateral flank pain.  She reports that this has been going on intermittently for several days and was diagnosed with a UTI recently.  No fever.  No abdominal pain or vomiting or diarrhea.     IM HPI:  Patient is well known to our service.  Patient has a history of IV drug abuse and unfortunately has relapsed.  Patient has a history of a epidural abscess requiring a large lumbar surgery with multiple rehabilitative stays and therapy.  The patient was progressing to ambulation and recently has declined.  Patient admits to starting drugs including IV drug use again.  Patient presented to the ED with the above symptoms and she has a noticeable cardiac murmur today that we are getting a stat echocardiogram.  Patient's been started on antibiotics fluid resuscitated and seems to have sepsis.  Patient's urinary studies were abnormal but not overwhelming.      Overview/Hospital Course:  12/24/22 CG: Patient remains in the ICU today. Has a lot of muscle spasms and low back pain. Echocardiogram ordered yesterday and completed; significant for pulm HTN but without vegetations.   12/25/22 CG:  Overnight she was having a lot of significant discomfort and pain.  We placed her on Precedex and this has helped significantly with her body aches, her irritability and muscle spasms."  12/26/22 FM:  Patient required Precedex for agitation and mood " changes.  Patient is calm and cooperative this morning.  Patient appears to have E coli sepsis so will taper antibiotics.  Patient's echocardiogram showed no vegetation and E coli would be low risk for metastatic infection.  Will transfer the patient to the floor once her Precedex discontinue we counseled her again today on the importance of avoiding substances and illicit drugs.  This continues to be setback for her.  12/27/22 WC:  Patient overall remains stable and is slowly improving.  Urine culture has revealed Candida albicans in addition to blood culture revealing E coli.  12/28/22 WC:  Patient resting comfortably this morning.  Pharm ID on case for treatment duration recs.    12/29/22 WC:  no acute events overnight.  Back pain at this time.   12/20/22:  no acute events continue IV ABX for esbl  12/31/22 CG: stable, continuing antibiotics.   1/1/2 CG: stable, continuing antibiotics. Has not had a bowel movement in two weeks. Will give lactulose.    01/02/2023: Patient did have a bowel movement yesterday.  Completing last day of antimicrobial therapy anticipated discharge tomorrow.  1/3/2023: Improving BM with lactulose. Lower extremity weakness requiring aggressive PT and OT efforts. Patient understands she needs to be an active participant. Encouraged inbed physical exercises without assistance with therapists. Stable Hg/HCT.   1/4/2023: Lactulose every other day for regular bowel movement. PT and OT recommendations with continue efforts to help with lower extremity weakness which likely precipitated from minimal participation from patient over the past week to remain physically active. Hg/HCT remains stable. Last blood Cx x2 (12/28) NGTD x5 days. Patient agrees moving forward lower extremity strengthening will have to take place with her participation and motivation. Will discharge with continue PT and OT efforts outpatient. She acknowledges outpatient follow up with GI/hepatologist, hematologist and  neurologist required to address all other chronic medication conditions.   1/5/2023: Overnight report from nursing that patient exhibited weakness in lower extremities and in upper body strength to support self to safely transfer from bed to commode or even bed to wheelchair. CM involved in placement to SNF. Continue with PT and OT. All other medical condition addressed during hospitalization remains stable.      Interval History: Patient seen and examined.     Review of Systems   Constitutional:  Negative for activity change, appetite change, chills, diaphoresis, fatigue and fever.   HENT:  Negative for sore throat.    Eyes:  Negative for pain.   Respiratory:  Negative for cough, choking, chest tightness and shortness of breath.    Cardiovascular:  Negative for chest pain, palpitations and leg swelling.   Gastrointestinal:  Negative for abdominal pain, constipation, diarrhea, nausea, rectal pain and vomiting.   Genitourinary:  Negative for dyspareunia, dysuria, flank pain, frequency, genital sores, hematuria and menstrual problem.   Musculoskeletal:  Positive for arthralgias, back pain, gait problem, myalgias and neck pain.   Skin:  Negative for pallor, rash and wound.   Neurological:  Positive for weakness. Negative for dizziness, tremors, speech difficulty and headaches.   Psychiatric/Behavioral:  Negative for agitation, behavioral problems, confusion, decreased concentration and dysphoric mood.    Objective:     Vital Signs (Most Recent):  Temp: 98.3 °F (36.8 °C) (01/05/23 1622)  Pulse: 102 (01/05/23 1622)  Resp: 20 (01/05/23 1622)  BP: 124/67 (01/05/23 1622)  SpO2: 99 % (01/05/23 1622) Vital Signs (24h Range):  Temp:  [97.7 °F (36.5 °C)-98.3 °F (36.8 °C)] 98.3 °F (36.8 °C)  Pulse:  [] 102  Resp:  [16-20] 20  SpO2:  [97 %-100 %] 99 %  BP: (111-141)/(56-76) 124/67     Weight: 97.5 kg (215 lb)  Body mass index is 33.67 kg/m².    Intake/Output Summary (Last 24 hours) at 1/5/2023 1935  Last data filed at  1/5/2023 0448  Gross per 24 hour   Intake 240 ml   Output 650 ml   Net -410 ml      Physical Exam  Vitals and nursing note reviewed.   Constitutional:       General: She is not in acute distress.     Appearance: She is obese. She is ill-appearing.   HENT:      Head: Normocephalic and atraumatic.      Right Ear: External ear normal.      Left Ear: External ear normal.      Nose: Nose normal. No congestion or rhinorrhea.      Mouth/Throat:      Mouth: Mucous membranes are dry.      Pharynx: No oropharyngeal exudate.   Eyes:      General: No scleral icterus.     Extraocular Movements: Extraocular movements intact.      Conjunctiva/sclera: Conjunctivae normal.   Neck:      Vascular: No carotid bruit.      Comments: Limited ROM at baseline  Cardiovascular:      Rate and Rhythm: Normal rate and regular rhythm.      Heart sounds: Murmur heard.     No friction rub. No gallop.   Pulmonary:      Effort: No respiratory distress.      Breath sounds: No stridor. No wheezing, rhonchi or rales.   Chest:      Chest wall: No tenderness.   Abdominal:      General: There is no distension.      Palpations: Abdomen is soft. There is no mass.      Tenderness: There is no abdominal tenderness. There is no right CVA tenderness, left CVA tenderness or guarding.   Musculoskeletal:         General: No swelling, tenderness or deformity.      Cervical back: Normal range of motion and neck supple. No rigidity or tenderness.      Right lower leg: Edema present.      Left lower leg: Edema present.   Lymphadenopathy:      Cervical: No cervical adenopathy.   Skin:     Capillary Refill: Capillary refill takes less than 2 seconds.      Coloration: Skin is not jaundiced or pale.   Neurological:      Mental Status: She is oriented to person, place, and time. Mental status is at baseline.      Cranial Nerves: No cranial nerve deficit.      Sensory: Sensory deficit present.      Motor: Weakness present.   Psychiatric:         Mood and Affect: Mood normal.          Behavior: Behavior normal.      Comments: Calm, cooperative, moving all extremities.     Significant Labs: None    Significant Imaging: None      Assessment/Plan:      * Debility  Lower extremity weakness bilaterally now requiring aggressive OT and PT efforts to prevent further weakness and also regain strength necessary to carry out ADLs.   - From early bed bound state, patient has required multiple nursing staff in safely getting in and out of bed   - Original discharge plans with outpatient rehab will cause more harm to patient at this time and case management working to find a placement at SNF with PT and OT efforts      Pulmonary hypertension  Found incidentally on echocardiogram with evidence of elevated right sided pressures and dilated heart chambers on the right side. Unclear the exact etiology or class of Pulm HTN but given her significant substance abuse Class 1 certainly is a possibility.   - Will refer to outpatient pulmonologist.   - Will consider a cariology consult while inpatient to see if they think it would be beneficial to have a heart cath done while inpatient      Severe sepsis  This patient does have evidence of infective focus  My overall impression is sepsis. Vital signs were reviewed and noted in progress note.  Antibiotics given-   Antibiotics (From admission, onward)    Start     Stop Route Frequency Ordered    01/02/23 1515  meropenem (MERREM) 1 g in sodium chloride 0.9 % 100 mL IVPB (MB+)        See Hyperspace for full Linked Orders Report.    01/03 0714 IV Every 8 hours (non-standard times) 01/02/23 0923    12/23/22 0533  vancomycin (VANCOCIN) 1,000 mg injection        Note to Pharmacy: Created by cabinet override    12/23 1744   12/23/22 0533    12/23/22 0405  piperacillin-tazobactam (ZOSYN) 4.5 gram injection        Note to Pharmacy: Created by cabinet override    12/23 1614   12/23/22 0405        Cultures were taken-   Microbiology Results (last 7 days)     Procedure  Component Value Units Date/Time    Blood culture [[id number]] Collected: 12/28/22 1058    Order Status: Completed Specimen: Blood Updated: 01/02/23 1812     Blood Culture, Routine No growth after 5 days.    Blood culture [[id number]] Collected: 12/28/22 1058    Order Status: Completed Specimen: Blood Updated: 01/02/23 1812     Blood Culture, Routine No growth after 5 days.    Blood culture #2 **CANNOT BE ORDERED STAT** [[id number]] Collected: 12/23/22 0257    Order Status: Completed Specimen: Blood Updated: 12/28/22 2022     Blood Culture, Routine No growth after 5 days.        Latest lactate reviewed, they are-  No results for input(s): LACTATE in the last 72 hours.    Organ dysfunction indicated by Acute kidney injury, Encephalopathy  and Acute liver injury  Source- ?    Source control Achieved by- see orders  - Merrem day 7 of 7 (completed on 1/3/23)  - Blood cx x2 (1/2/23) NGTD      Murmur, cardiac  No evidence of vegetations.     Mild episode of recurrent major depressive disorder  Resume home meds.   - lyrica discontinued  - start gabapentin 300 mg TID      Cannabis use disorder, moderate, dependence  As above      Amphetamine use disorder, severe  Continue to  educate and support      Spinal stenosis at L4-L5 level        Substance use disorder  Cessation advised. Follow up consult psychiatry.     Chronic hepatitis C with cirrhosis  Labs noted, will require follow up outpatient with GI to start treatment.       Cirrhosis of liver without ascites  Labs noted, stable.  AST   Date Value Ref Range Status   01/04/2023 40 10 - 40 U/L Final   01/03/2023 37 10 - 40 U/L Final   01/02/2023 37 10 - 40 U/L Final   01/01/2023 34 10 - 40 U/L Final   12/31/2022 34 10 - 40 U/L Final     ALT   Date Value Ref Range Status   01/04/2023 17 10 - 44 U/L Final   01/03/2023 16 10 - 44 U/L Final   01/02/2023 16 10 - 44 U/L Final   01/01/2023 16 10 - 44 U/L Final   12/31/2022 16 10 - 44 U/L Final        Alkaline Phosphatase   Date  Value Ref Range Status   01/04/2023 130 55 - 135 U/L Final   01/03/2023 130 55 - 135 U/L Final   01/02/2023 136 (H) 55 - 135 U/L Final   01/01/2023 125 55 - 135 U/L Final   12/31/2022 126 55 - 135 U/L Final            UTI (urinary tract infection)    Component      Latest Ref Rng & Units 12/23/2022   Urine Culture, Routine       JACINTA ALBICANS (A) . . .   Suspect source of sepsis.  - Treat candida x 2 weeks given hx.      VTE Risk Mitigation (From admission, onward)         Ordered     IP VTE HIGH RISK PATIENT  Once         12/23/22 0647     Place sequential compression device  Until discontinued         12/23/22 0647                Discharge Planning   SHIRA: 1/4/2023     Code Status: Full Code   Is the patient medically ready for discharge?:     Reason for patient still in hospital (select all that apply): Pending disposition  Discharge Plan A: Skilled Nursing Facility   Discharge Delays: None known at this time              Dannielle Merino DO  Department of Hospital Medicine   [facility]

## 2023-01-06 NOTE — SUBJECTIVE & OBJECTIVE
Interval History: Patient seen and examined.     Review of Systems   Constitutional:  Negative for activity change, appetite change, chills, diaphoresis, fatigue and fever.   HENT:  Negative for sore throat.    Eyes:  Negative for pain.   Respiratory:  Negative for cough, choking, chest tightness and shortness of breath.    Cardiovascular:  Negative for chest pain, palpitations and leg swelling.   Gastrointestinal:  Negative for abdominal pain, constipation, diarrhea, nausea, rectal pain and vomiting.   Genitourinary:  Negative for dyspareunia, dysuria, flank pain, frequency, genital sores, hematuria and menstrual problem.   Musculoskeletal:  Positive for arthralgias, back pain, gait problem, myalgias and neck pain.   Skin:  Negative for pallor, rash and wound.   Neurological:  Positive for weakness. Negative for dizziness, tremors, speech difficulty and headaches.   Psychiatric/Behavioral:  Negative for agitation, behavioral problems, confusion, decreased concentration and dysphoric mood.    Objective:     Vital Signs (Most Recent):  Temp: 98.3 °F (36.8 °C) (01/05/23 1622)  Pulse: 102 (01/05/23 1622)  Resp: 20 (01/05/23 1622)  BP: 124/67 (01/05/23 1622)  SpO2: 99 % (01/05/23 1622) Vital Signs (24h Range):  Temp:  [97.7 °F (36.5 °C)-98.3 °F (36.8 °C)] 98.3 °F (36.8 °C)  Pulse:  [] 102  Resp:  [16-20] 20  SpO2:  [97 %-100 %] 99 %  BP: (111-141)/(56-76) 124/67     Weight: 97.5 kg (215 lb)  Body mass index is 33.67 kg/m².    Intake/Output Summary (Last 24 hours) at 1/5/2023 1935  Last data filed at 1/5/2023 0448  Gross per 24 hour   Intake 240 ml   Output 650 ml   Net -410 ml      Physical Exam  Vitals and nursing note reviewed.   Constitutional:       General: She is not in acute distress.     Appearance: She is obese. She is ill-appearing.   HENT:      Head: Normocephalic and atraumatic.      Right Ear: External ear normal.      Left Ear: External ear normal.      Nose: Nose normal. No congestion or rhinorrhea.       Mouth/Throat:      Mouth: Mucous membranes are dry.      Pharynx: No oropharyngeal exudate.   Eyes:      General: No scleral icterus.     Extraocular Movements: Extraocular movements intact.      Conjunctiva/sclera: Conjunctivae normal.   Neck:      Vascular: No carotid bruit.      Comments: Limited ROM at baseline  Cardiovascular:      Rate and Rhythm: Normal rate and regular rhythm.      Heart sounds: Murmur heard.     No friction rub. No gallop.   Pulmonary:      Effort: No respiratory distress.      Breath sounds: No stridor. No wheezing, rhonchi or rales.   Chest:      Chest wall: No tenderness.   Abdominal:      General: There is no distension.      Palpations: Abdomen is soft. There is no mass.      Tenderness: There is no abdominal tenderness. There is no right CVA tenderness, left CVA tenderness or guarding.   Musculoskeletal:         General: No swelling, tenderness or deformity.      Cervical back: Normal range of motion and neck supple. No rigidity or tenderness.      Right lower leg: Edema present.      Left lower leg: Edema present.   Lymphadenopathy:      Cervical: No cervical adenopathy.   Skin:     Capillary Refill: Capillary refill takes less than 2 seconds.      Coloration: Skin is not jaundiced or pale.   Neurological:      Mental Status: She is oriented to person, place, and time. Mental status is at baseline.      Cranial Nerves: No cranial nerve deficit.      Sensory: Sensory deficit present.      Motor: Weakness present.   Psychiatric:         Mood and Affect: Mood normal.         Behavior: Behavior normal.      Comments: Calm, cooperative, moving all extremities.     Significant Labs: None    Significant Imaging: None

## 2023-01-06 NOTE — PLAN OF CARE
Spoke to Aileen with [facility] they are willing to accept this patient for post-acute placement.

## 2023-01-06 NOTE — ASSESSMENT & PLAN NOTE
Lower extremity weakness bilaterally now requiring aggressive OT and PT efforts to prevent further weakness and also regain strength necessary to carry out ADLs.   - From early bed bound state, patient has required multiple nursing staff in safely getting in and out of bed   - Original discharge plans with outpatient rehab will cause more harm to patient at this time and case management working to find a placement at SNF with PT and OT efforts

## 2023-01-06 NOTE — PLAN OF CARE
Problem: Occupational Therapy  Goal: Occupational Therapy Goal  Description: Goals to be met by: 01/02/22     Patient will increase functional independence with ADLs by performing:    UE Dressing with Set-up Assistance.  LE Dressing with Minimal Assistance.  Grooming while seated with Modified [place].  Toileting from toilet with Supervision for hygiene and clothing management.   Bathing from  shower chair/bench with Minimal Assistance.  Step transfer with Minimal Assistance  Toilet transfer to toilet with Minimal Assistance.  Increased functional strength to 4/5 for bilateral UE's.    Outcome: Ongoing, Progressing   Continue with POC.

## 2023-01-07 LAB
ALBUMIN SERPL BCP-MCNC: 1.9 G/DL (ref 3.5–5.2)
ALP SERPL-CCNC: 138 U/L (ref 55–135)
ALT SERPL W/O P-5'-P-CCNC: 18 U/L (ref 10–44)
ANION GAP SERPL CALC-SCNC: 2 MMOL/L (ref 8–16)
AST SERPL-CCNC: 42 U/L (ref 10–40)
BASOPHILS # BLD AUTO: ABNORMAL K/UL (ref 0–0.2)
BASOPHILS NFR BLD: 0 % (ref 0–1.9)
BILIRUB SERPL-MCNC: 1 MG/DL (ref 0.1–1)
BUN SERPL-MCNC: 13 MG/DL (ref 6–20)
CALCIUM SERPL-MCNC: 8 MG/DL (ref 8.7–10.5)
CHLORIDE SERPL-SCNC: 107 MMOL/L (ref 95–110)
CO2 SERPL-SCNC: 30 MMOL/L (ref 23–29)
CREAT SERPL-MCNC: 0.5 MG/DL (ref 0.5–1.4)
DIFFERENTIAL METHOD: ABNORMAL
EOSINOPHIL # BLD AUTO: ABNORMAL K/UL (ref 0–0.5)
EOSINOPHIL NFR BLD: 4 % (ref 0–8)
ERYTHROCYTE [DISTWIDTH] IN BLOOD BY AUTOMATED COUNT: 17.4 % (ref 11.5–14.5)
EST. GFR  (NO RACE VARIABLE): >60 ML/MIN/1.73 M^2
GLUCOSE SERPL-MCNC: 102 MG/DL (ref 70–110)
HCT VFR BLD AUTO: 31 % (ref 37–48.5)
HGB BLD-MCNC: 9.7 G/DL (ref 12–16)
IMM GRANULOCYTES # BLD AUTO: ABNORMAL K/UL (ref 0–0.04)
IMM GRANULOCYTES NFR BLD AUTO: ABNORMAL % (ref 0–0.5)
LYMPHOCYTES # BLD AUTO: ABNORMAL K/UL (ref 1–4.8)
LYMPHOCYTES NFR BLD: 22 % (ref 18–48)
MAGNESIUM SERPL-MCNC: 1.9 MG/DL (ref 1.6–2.6)
MCH RBC QN AUTO: 27.9 PG (ref 27–31)
MCHC RBC AUTO-ENTMCNC: 31.3 G/DL (ref 32–36)
MCV RBC AUTO: 89 FL (ref 82–98)
MONOCYTES # BLD AUTO: ABNORMAL K/UL (ref 0.3–1)
MONOCYTES NFR BLD: 10 % (ref 4–15)
NEUTROPHILS NFR BLD: 63 % (ref 38–73)
NEUTS BAND NFR BLD MANUAL: 0 %
NRBC BLD-RTO: 0 /100 WBC
PLATELET # BLD AUTO: 79 K/UL (ref 150–450)
PMV BLD AUTO: 10.4 FL (ref 9.2–12.9)
POTASSIUM SERPL-SCNC: 4.1 MMOL/L (ref 3.5–5.1)
PROT SERPL-MCNC: 7.5 G/DL (ref 6–8.4)
RBC # BLD AUTO: 3.48 M/UL (ref 4–5.4)
SODIUM SERPL-SCNC: 139 MMOL/L (ref 136–145)
WBC # BLD AUTO: 1.82 K/UL (ref 3.9–12.7)

## 2023-01-07 PROCEDURE — 85027 COMPLETE CBC AUTOMATED: CPT | Performed by: INTERNAL MEDICINE

## 2023-01-07 PROCEDURE — 97530 THERAPEUTIC ACTIVITIES: CPT

## 2023-01-07 PROCEDURE — 25000003 PHARM REV CODE 250: Performed by: INTERNAL MEDICINE

## 2023-01-07 PROCEDURE — 80053 COMPREHEN METABOLIC PANEL: CPT | Performed by: INTERNAL MEDICINE

## 2023-01-07 PROCEDURE — 85007 BL SMEAR W/DIFF WBC COUNT: CPT | Performed by: INTERNAL MEDICINE

## 2023-01-07 PROCEDURE — 11000001 HC ACUTE MED/SURG PRIVATE ROOM

## 2023-01-07 PROCEDURE — 27000207 HC ISOLATION

## 2023-01-07 PROCEDURE — 36415 COLL VENOUS BLD VENIPUNCTURE: CPT | Performed by: INTERNAL MEDICINE

## 2023-01-07 PROCEDURE — 25000003 PHARM REV CODE 250: Performed by: PSYCHIATRY & NEUROLOGY

## 2023-01-07 PROCEDURE — 83735 ASSAY OF MAGNESIUM: CPT | Performed by: INTERNAL MEDICINE

## 2023-01-07 RX ADMIN — GABAPENTIN 300 MG: 300 CAPSULE ORAL at 02:01

## 2023-01-07 RX ADMIN — BUPROPION HYDROCHLORIDE 100 MG: 100 TABLET, FILM COATED ORAL at 08:01

## 2023-01-07 RX ADMIN — HYDROCODONE BITARTRATE AND ACETAMINOPHEN 1 TABLET: 5; 325 TABLET ORAL at 01:01

## 2023-01-07 RX ADMIN — METHOCARBAMOL TABLETS 500 MG: 500 TABLET, COATED ORAL at 08:01

## 2023-01-07 RX ADMIN — METHOCARBAMOL TABLETS 500 MG: 500 TABLET, COATED ORAL at 09:01

## 2023-01-07 RX ADMIN — PANTOPRAZOLE SODIUM 40 MG: 40 TABLET, DELAYED RELEASE ORAL at 09:01

## 2023-01-07 RX ADMIN — HYDROCODONE BITARTRATE AND ACETAMINOPHEN 1 TABLET: 5; 325 TABLET ORAL at 10:01

## 2023-01-07 RX ADMIN — GABAPENTIN 300 MG: 300 CAPSULE ORAL at 08:01

## 2023-01-07 RX ADMIN — BUPROPION HYDROCHLORIDE 100 MG: 100 TABLET, FILM COATED ORAL at 09:01

## 2023-01-07 RX ADMIN — HYDROCODONE BITARTRATE AND ACETAMINOPHEN 1 TABLET: 5; 325 TABLET ORAL at 11:01

## 2023-01-07 RX ADMIN — METHOCARBAMOL TABLETS 500 MG: 500 TABLET, COATED ORAL at 12:01

## 2023-01-07 RX ADMIN — METHOCARBAMOL TABLETS 500 MG: 500 TABLET, COATED ORAL at 04:01

## 2023-01-07 RX ADMIN — HYDROCODONE BITARTRATE AND ACETAMINOPHEN 1 TABLET: 5; 325 TABLET ORAL at 04:01

## 2023-01-07 RX ADMIN — ALPRAZOLAM 0.5 MG: 0.5 TABLET ORAL at 01:01

## 2023-01-07 RX ADMIN — GABAPENTIN 300 MG: 300 CAPSULE ORAL at 09:01

## 2023-01-07 RX ADMIN — ALPRAZOLAM 0.5 MG: 0.5 TABLET ORAL at 11:01

## 2023-01-07 NOTE — SUBJECTIVE & OBJECTIVE
Interval History: Patient seen and examined.     Review of Systems   Constitutional:  Negative for activity change, appetite change, chills, diaphoresis, fatigue and fever.   HENT:  Negative for sore throat.    Eyes:  Negative for pain.   Respiratory:  Negative for cough, choking, chest tightness and shortness of breath.    Cardiovascular:  Negative for chest pain, palpitations and leg swelling.   Gastrointestinal:  Negative for abdominal pain, constipation, diarrhea, nausea, rectal pain and vomiting.   Genitourinary:  Negative for dyspareunia, dysuria, flank pain, frequency, genital sores, hematuria and menstrual problem.   Musculoskeletal:  Positive for arthralgias, back pain, gait problem, myalgias and neck pain.   Skin:  Negative for pallor, rash and wound.   Neurological:  Positive for weakness. Negative for dizziness, tremors, speech difficulty and headaches.   Psychiatric/Behavioral:  Negative for agitation, behavioral problems, confusion, decreased concentration and dysphoric mood.    Objective:     Vital Signs (Most Recent):  Temp: 98 °F (36.7 °C) (01/06/23 1618)  Pulse: 88 (01/06/23 1618)  Resp: 20 (01/06/23 1711)  BP: 118/61 (01/06/23 1618)  SpO2: 100 % (01/06/23 1618) Vital Signs (24h Range):  Temp:  [97.6 °F (36.4 °C)-98.2 °F (36.8 °C)] 98 °F (36.7 °C)  Pulse:  [88-96] 88  Resp:  [16-20] 20  SpO2:  [99 %-100 %] 100 %  BP: (118-128)/(55-61) 118/61     Weight: 97.5 kg (215 lb)  Body mass index is 33.67 kg/m².    Intake/Output Summary (Last 24 hours) at 1/6/2023 1921  Last data filed at 1/6/2023 1827  Gross per 24 hour   Intake 2160 ml   Output 1700 ml   Net 460 ml      Physical Exam  Vitals and nursing note reviewed.   Constitutional:       General: She is not in acute distress.     Appearance: She is obese. She is ill-appearing.   HENT:      Head: Normocephalic and atraumatic.      Right Ear: External ear normal.      Left Ear: External ear normal.      Nose: Nose normal. No congestion or rhinorrhea.       Mouth/Throat:      Mouth: Mucous membranes are dry.      Pharynx: No oropharyngeal exudate.   Eyes:      General: No scleral icterus.     Extraocular Movements: Extraocular movements intact.      Conjunctiva/sclera: Conjunctivae normal.   Neck:      Vascular: No carotid bruit.      Comments: Limited ROM at baseline  Cardiovascular:      Rate and Rhythm: Normal rate and regular rhythm.      Heart sounds: Murmur heard.     No friction rub. No gallop.   Pulmonary:      Effort: No respiratory distress.      Breath sounds: No stridor. No wheezing, rhonchi or rales.   Chest:      Chest wall: No tenderness.   Abdominal:      General: There is no distension.      Palpations: Abdomen is soft. There is no mass.      Tenderness: There is no abdominal tenderness. There is no right CVA tenderness, left CVA tenderness or guarding.   Musculoskeletal:         General: No swelling, tenderness or deformity.      Cervical back: Normal range of motion and neck supple. No rigidity or tenderness.      Right lower leg: Edema present.      Left lower leg: Edema present.   Lymphadenopathy:      Cervical: No cervical adenopathy.   Skin:     Capillary Refill: Capillary refill takes less than 2 seconds.      Coloration: Skin is not jaundiced or pale.   Neurological:      Mental Status: She is oriented to person, place, and time. Mental status is at baseline.      Cranial Nerves: No cranial nerve deficit.      Sensory: Sensory deficit present.      Motor: Weakness present.   Psychiatric:         Mood and Affect: Mood normal.         Behavior: Behavior normal.      Comments: Calm, cooperative, moving all extremities.     Significant Labs: All pertinent labs within the past 24 hours have been reviewed.  None    Significant Imaging: I have reviewed all pertinent imaging results/findings within the past 24 hours.

## 2023-01-07 NOTE — PROGRESS NOTES
"[facility] Medicine  Progress Note    Patient Name: Rachel Zazueta  MRN: [id number]  Patient Class: IP- Inpatient   Admission Date: 12/23/2022  Length of Stay: 15 days  Attending Physician: Dannielle Merino DO  Primary Care Provider: Dannielle Merino DO        Subjective:     Principal Problem:Debility        HPI:  ER HPI:  42-year-old female with a history of anemia, anxiety, recurrent cellulitis, hepatitis C, IVDU, liver cirrhosis, cholecystectomy, kidney stones, lumbar fusion, shoulder surgery, chronic back pain comes in c/o generalized weakness, fatigue, and bilateral flank pain.  She reports that this has been going on intermittently for several days and was diagnosed with a UTI recently.  No fever.  No abdominal pain or vomiting or diarrhea.     IM HPI:  Patient is well known to our service.  Patient has a history of IV drug abuse and unfortunately has relapsed.  Patient has a history of a epidural abscess requiring a large lumbar surgery with multiple rehabilitative stays and therapy.  The patient was progressing to ambulation and recently has declined.  Patient admits to starting drugs including IV drug use again.  Patient presented to the ED with the above symptoms and she has a noticeable cardiac murmur today that we are getting a stat echocardiogram.  Patient's been started on antibiotics fluid resuscitated and seems to have sepsis.  Patient's urinary studies were abnormal but not overwhelming.      Overview/Hospital Course:  12/24/22 CG: Patient remains in the ICU today. Has a lot of muscle spasms and low back pain. Echocardiogram ordered yesterday and completed; significant for pulm HTN but without vegetations.   12/25/22 CG:  Overnight she was having a lot of significant discomfort and pain.  We placed her on Precedex and this has helped significantly with her body aches, her irritability and muscle spasms."  12/26/22 FM:  Patient required Precedex for agitation and mood " changes.  Patient is calm and cooperative this morning.  Patient appears to have E coli sepsis so will taper antibiotics.  Patient's echocardiogram showed no vegetation and E coli would be low risk for metastatic infection.  Will transfer the patient to the floor once her Precedex discontinue we counseled her again today on the importance of avoiding substances and illicit drugs.  This continues to be setback for her.  12/27/22 WC:  Patient overall remains stable and is slowly improving.  Urine culture has revealed Candida albicans in addition to blood culture revealing E coli.  12/28/22 WC:  Patient resting comfortably this morning.  Pharm ID on case for treatment duration recs.    12/29/22 WC:  no acute events overnight.  Back pain at this time.   12/20/22:  no acute events continue IV ABX for esbl  12/31/22 CG: stable, continuing antibiotics.   1/1/2 CG: stable, continuing antibiotics. Has not had a bowel movement in two weeks. Will give lactulose.    01/02/2023: Patient did have a bowel movement yesterday.  Completing last day of antimicrobial therapy anticipated discharge tomorrow.  1/3/2023: Improving BM with lactulose. Lower extremity weakness requiring aggressive PT and OT efforts. Patient understands she needs to be an active participant. Encouraged inbed physical exercises without assistance with therapists. Stable Hg/HCT.   1/4/2023: Lactulose every other day for regular bowel movement. PT and OT recommendations with continue efforts to help with lower extremity weakness which likely precipitated from minimal participation from patient over the past week to remain physically active. Hg/HCT remains stable. Last blood Cx x2 (12/28) NGTD x5 days. Patient agrees moving forward lower extremity strengthening will have to take place with her participation and motivation. Will discharge with continue PT and OT efforts outpatient. She acknowledges outpatient follow up with GI/hepatologist, hematologist and  neurologist required to address all other chronic medication conditions.   1/5/2023: Overnight report from nursing that patient exhibited weakness in lower extremities and in upper body strength to support self to safely transfer from bed to commode or even bed to wheelchair. CM involved in placement to SNF. Continue with PT and OT. All other medical condition addressed during hospitalization remains stable.  1/6/2023: CM working on SNF placement. Patient expresses continue effort on her part to regain her strength, reassured her that she has been able to participate following the leads of PT and OT and so a new injury is less likely at this time. While small, she endorsed sitting up at bedside with legs dangle.    1/7/23 ND patient feeling well this morning no complaints will continue therapy services and rehab placement      Interval History: Patient seen and examined.     Review of Systems   Constitutional:  Negative for activity change, appetite change, chills, diaphoresis, fatigue and fever.   HENT:  Negative for sore throat.    Eyes:  Negative for pain.   Respiratory:  Negative for cough, choking, chest tightness and shortness of breath.    Cardiovascular:  Negative for chest pain, palpitations and leg swelling.   Gastrointestinal:  Negative for abdominal pain, constipation, diarrhea, nausea, rectal pain and vomiting.   Genitourinary:  Negative for dyspareunia, dysuria, flank pain, frequency, genital sores, hematuria and menstrual problem.   Musculoskeletal:  Positive for arthralgias, back pain, gait problem, myalgias and neck pain.   Skin:  Negative for pallor, rash and wound.   Neurological:  Positive for weakness. Negative for dizziness, tremors, speech difficulty and headaches.   Psychiatric/Behavioral:  Negative for agitation, behavioral problems, confusion, decreased concentration and dysphoric mood.    Objective:     Vital Signs (Most Recent):  Temp: 97.9 °F (36.6 °C) (01/07/23 0732)  Pulse: 86  (01/07/23 0732)  Resp: 20 (01/07/23 1023)  BP: (!) 109/54 (01/07/23 0732)  SpO2: 99 % (01/07/23 0732) Vital Signs (24h Range):  Temp:  [97.9 °F (36.6 °C)-98.2 °F (36.8 °C)] 97.9 °F (36.6 °C)  Pulse:  [] 86  Resp:  [16-20] 20  SpO2:  [96 %-100 %] 99 %  BP: (101-122)/(54-66) 109/54     Weight: 97.5 kg (215 lb)  Body mass index is 33.67 kg/m².    Intake/Output Summary (Last 24 hours) at 1/7/2023 1111  Last data filed at 1/7/2023 0614  Gross per 24 hour   Intake 1080 ml   Output 1900 ml   Net -820 ml        Physical Exam  Vitals and nursing note reviewed.   Constitutional:       General: She is not in acute distress.     Appearance: She is obese.   HENT:      Head: Normocephalic and atraumatic.      Right Ear: External ear normal.      Left Ear: External ear normal.      Nose: Nose normal. No congestion or rhinorrhea.      Mouth/Throat:      Mouth: Mucous membranes are moist.      Pharynx: No oropharyngeal exudate.   Eyes:      General: No scleral icterus.     Extraocular Movements: Extraocular movements intact.      Conjunctiva/sclera: Conjunctivae normal.   Neck:      Vascular: No carotid bruit.      Comments: Limited ROM at baseline  Cardiovascular:      Rate and Rhythm: Normal rate and regular rhythm.      Heart sounds: Murmur heard.     No friction rub. No gallop.   Pulmonary:      Effort: No respiratory distress.      Breath sounds: No stridor. No wheezing, rhonchi or rales.   Chest:      Chest wall: No tenderness.   Abdominal:      General: There is no distension.      Palpations: Abdomen is soft. There is no mass.      Tenderness: There is no abdominal tenderness. There is no right CVA tenderness, left CVA tenderness or guarding.   Musculoskeletal:         General: No swelling, tenderness or deformity.      Cervical back: Normal range of motion and neck supple. No rigidity or tenderness.      Right lower leg: Edema present.      Left lower leg: Edema present.   Lymphadenopathy:      Cervical: No cervical  adenopathy.   Skin:     Capillary Refill: Capillary refill takes less than 2 seconds.      Coloration: Skin is not jaundiced or pale.   Neurological:      Mental Status: She is oriented to person, place, and time. Mental status is at baseline.      Cranial Nerves: No cranial nerve deficit.      Sensory: Sensory deficit present.      Motor: Weakness present.   Psychiatric:         Mood and Affect: Mood normal.         Behavior: Behavior normal.      Comments: Calm, cooperative, moving all extremities.     Significant Labs: All pertinent labs within the past 24 hours have been reviewed.  None    Significant Imaging: I have reviewed all pertinent imaging results/findings within the past 24 hours.      Assessment/Plan:      * Debility  Lower extremity weakness bilaterally now requiring aggressive OT and PT efforts to prevent further weakness and also regain strength necessary to carry out ADLs.   - From early bed bound state, patient has required multiple nursing staff in safely getting in and out of bed   - Original discharge plans with outpatient rehab will cause more harm to patient at this time and case management working to find a placement at SNF with PT and OT efforts      Pulmonary hypertension  Found incidentally on echocardiogram with evidence of elevated right sided pressures and dilated heart chambers on the right side. Unclear the exact etiology or class of Pulm HTN but given her significant substance abuse Class 1 certainly is a possibility.   - Will refer to outpatient pulmonologist  - Will consider a cariology consult while inpatient to see if they think it would be beneficial to have a heart cath done while inpatient      Severe sepsis  This patient does have evidence of infective focus  My overall impression is sepsis. Vital signs were reviewed and noted in progress note.  Antibiotics given-   Antibiotics (From admission, onward)    Start     Stop Route Frequency Ordered    01/02/23 1515  meropenem  (MERREM) 1 g in sodium chloride 0.9 % 100 mL IVPB (MB+)        See Hyperspace for full Linked Orders Report.    01/03 0714 IV Every 8 hours (non-standard times) 01/02/23 0923    12/23/22 0533  vancomycin (VANCOCIN) 1,000 mg injection        Note to Pharmacy: Created by cabinet override    12/23 1744   12/23/22 0533    12/23/22 0405  piperacillin-tazobactam (ZOSYN) 4.5 gram injection        Note to Pharmacy: Created by cabinet override    12/23 1614   12/23/22 0405        Cultures were taken-   Microbiology Results (last 7 days)     Procedure Component Value Units Date/Time    Blood culture [[id number]] Collected: 12/28/22 1058    Order Status: Completed Specimen: Blood Updated: 01/02/23 1812     Blood Culture, Routine No growth after 5 days.    Blood culture [[id number]] Collected: 12/28/22 1058    Order Status: Completed Specimen: Blood Updated: 01/02/23 1812     Blood Culture, Routine No growth after 5 days.        Latest lactate reviewed, they are-  No results for input(s): LACTATE in the last 72 hours.    Organ dysfunction indicated by Acute kidney injury, Encephalopathy  and Acute liver injury  Source- ?    Source control Achieved by- see orders  - Merrem day 7 of 7 (completed on 1/3/23)  - Blood cx x2 (1/2/23) NGTD    1/7 resolved    Murmur, cardiac  No evidence of vegetations.     Mild episode of recurrent major depressive disorder  Resume home meds.   - lyrica discontinued  - continue gabapentin 300 mg TID      Cannabis use disorder, moderate, dependence  As above      Amphetamine use disorder, severe  Continue to  educate and support      Spinal stenosis at L4-L5 level        Substance use disorder  Cessation advised. Follow up consult psychiatry.     Chronic hepatitis C with cirrhosis  Labs noted, will require follow up outpatient with GI to start treatment.       Cirrhosis of liver without ascites  Labs noted, stable.  AST   Date Value Ref Range Status   01/07/2023 42 (H) 10 - 40 U/L Final    01/04/2023 40 10 - 40 U/L Final   01/03/2023 37 10 - 40 U/L Final   01/02/2023 37 10 - 40 U/L Final   01/01/2023 34 10 - 40 U/L Final     ALT   Date Value Ref Range Status   01/07/2023 18 10 - 44 U/L Final   01/04/2023 17 10 - 44 U/L Final   01/03/2023 16 10 - 44 U/L Final   01/02/2023 16 10 - 44 U/L Final   01/01/2023 16 10 - 44 U/L Final        Alkaline Phosphatase   Date Value Ref Range Status   01/07/2023 138 (H) 55 - 135 U/L Final   01/04/2023 130 55 - 135 U/L Final   01/03/2023 130 55 - 135 U/L Final   01/02/2023 136 (H) 55 - 135 U/L Final   01/01/2023 125 55 - 135 U/L Final              VTE Risk Mitigation (From admission, onward)         Ordered     IP VTE HIGH RISK PATIENT  Once         12/23/22 0647     Place sequential compression device  Until discontinued         12/23/22 0647                Discharge Planning   SHIRA: 1/4/2023     Code Status: Full Code   Is the patient medically ready for discharge?:     Reason for patient still in hospital (select all that apply): Treatment  Discharge Plan A: Skilled Nursing Facility   Discharge Delays: None known at this time              Shannon Oliveira MD  Department of Hospital Medicine   [facility]

## 2023-01-07 NOTE — ASSESSMENT & PLAN NOTE
This patient does have evidence of infective focus  My overall impression is sepsis. Vital signs were reviewed and noted in progress note.  Antibiotics given-   Antibiotics (From admission, onward)    Start     Stop Route Frequency Ordered    01/02/23 1515  meropenem (MERREM) 1 g in sodium chloride 0.9 % 100 mL IVPB (MB+)        See Hyperspace for full Linked Orders Report.    01/03 0714 IV Every 8 hours (non-standard times) 01/02/23 0923    12/23/22 0533  vancomycin (VANCOCIN) 1,000 mg injection        Note to Pharmacy: Created by cabinet override    12/23 1744   12/23/22 0533    12/23/22 0405  piperacillin-tazobactam (ZOSYN) 4.5 gram injection        Note to Pharmacy: Created by cabinet override    12/23 1614   12/23/22 0405        Cultures were taken-   Microbiology Results (last 7 days)     Procedure Component Value Units Date/Time    Blood culture [[id number]] Collected: 12/28/22 1058    Order Status: Completed Specimen: Blood Updated: 01/02/23 1812     Blood Culture, Routine No growth after 5 days.    Blood culture [[id number]] Collected: 12/28/22 1058    Order Status: Completed Specimen: Blood Updated: 01/02/23 1812     Blood Culture, Routine No growth after 5 days.        Latest lactate reviewed, they are-  No results for input(s): LACTATE in the last 72 hours.    Organ dysfunction indicated by Acute kidney injury, Encephalopathy  and Acute liver injury  Source- ?    Source control Achieved by- see orders  - Merrem day 7 of 7 (completed on 1/3/23)  - Blood cx x2 (1/2/23) NGTD

## 2023-01-07 NOTE — SUBJECTIVE & OBJECTIVE
Interval History: Patient seen and examined.     Review of Systems   Constitutional:  Negative for activity change, appetite change, chills, diaphoresis, fatigue and fever.   HENT:  Negative for sore throat.    Eyes:  Negative for pain.   Respiratory:  Negative for cough, choking, chest tightness and shortness of breath.    Cardiovascular:  Negative for chest pain, palpitations and leg swelling.   Gastrointestinal:  Negative for abdominal pain, constipation, diarrhea, nausea, rectal pain and vomiting.   Genitourinary:  Negative for dyspareunia, dysuria, flank pain, frequency, genital sores, hematuria and menstrual problem.   Musculoskeletal:  Positive for arthralgias, back pain, gait problem, myalgias and neck pain.   Skin:  Negative for pallor, rash and wound.   Neurological:  Positive for weakness. Negative for dizziness, tremors, speech difficulty and headaches.   Psychiatric/Behavioral:  Negative for agitation, behavioral problems, confusion, decreased concentration and dysphoric mood.    Objective:     Vital Signs (Most Recent):  Temp: 97.9 °F (36.6 °C) (01/07/23 0732)  Pulse: 86 (01/07/23 0732)  Resp: 20 (01/07/23 1023)  BP: (!) 109/54 (01/07/23 0732)  SpO2: 99 % (01/07/23 0732) Vital Signs (24h Range):  Temp:  [97.9 °F (36.6 °C)-98.2 °F (36.8 °C)] 97.9 °F (36.6 °C)  Pulse:  [] 86  Resp:  [16-20] 20  SpO2:  [96 %-100 %] 99 %  BP: (101-122)/(54-66) 109/54     Weight: 97.5 kg (215 lb)  Body mass index is 33.67 kg/m².    Intake/Output Summary (Last 24 hours) at 1/7/2023 1111  Last data filed at 1/7/2023 0614  Gross per 24 hour   Intake 1080 ml   Output 1900 ml   Net -820 ml        Physical Exam  Vitals and nursing note reviewed.   Constitutional:       General: She is not in acute distress.     Appearance: She is obese.   HENT:      Head: Normocephalic and atraumatic.      Right Ear: External ear normal.      Left Ear: External ear normal.      Nose: Nose normal. No congestion or rhinorrhea.       Mouth/Throat:      Mouth: Mucous membranes are moist.      Pharynx: No oropharyngeal exudate.   Eyes:      General: No scleral icterus.     Extraocular Movements: Extraocular movements intact.      Conjunctiva/sclera: Conjunctivae normal.   Neck:      Vascular: No carotid bruit.      Comments: Limited ROM at baseline  Cardiovascular:      Rate and Rhythm: Normal rate and regular rhythm.      Heart sounds: Murmur heard.     No friction rub. No gallop.   Pulmonary:      Effort: No respiratory distress.      Breath sounds: No stridor. No wheezing, rhonchi or rales.   Chest:      Chest wall: No tenderness.   Abdominal:      General: There is no distension.      Palpations: Abdomen is soft. There is no mass.      Tenderness: There is no abdominal tenderness. There is no right CVA tenderness, left CVA tenderness or guarding.   Musculoskeletal:         General: No swelling, tenderness or deformity.      Cervical back: Normal range of motion and neck supple. No rigidity or tenderness.      Right lower leg: Edema present.      Left lower leg: Edema present.   Lymphadenopathy:      Cervical: No cervical adenopathy.   Skin:     Capillary Refill: Capillary refill takes less than 2 seconds.      Coloration: Skin is not jaundiced or pale.   Neurological:      Mental Status: She is oriented to person, place, and time. Mental status is at baseline.      Cranial Nerves: No cranial nerve deficit.      Sensory: Sensory deficit present.      Motor: Weakness present.   Psychiatric:         Mood and Affect: Mood normal.         Behavior: Behavior normal.      Comments: Calm, cooperative, moving all extremities.     Significant Labs: All pertinent labs within the past 24 hours have been reviewed.  None    Significant Imaging: I have reviewed all pertinent imaging results/findings within the past 24 hours.

## 2023-01-07 NOTE — ASSESSMENT & PLAN NOTE
This patient does have evidence of infective focus  My overall impression is sepsis. Vital signs were reviewed and noted in progress note.  Antibiotics given-   Antibiotics (From admission, onward)    Start     Stop Route Frequency Ordered    01/02/23 1515  meropenem (MERREM) 1 g in sodium chloride 0.9 % 100 mL IVPB (MB+)        See Hyperspace for full Linked Orders Report.    01/03 0714 IV Every 8 hours (non-standard times) 01/02/23 0923    12/23/22 0533  vancomycin (VANCOCIN) 1,000 mg injection        Note to Pharmacy: Created by cabinet override    12/23 1744   12/23/22 0533    12/23/22 0405  piperacillin-tazobactam (ZOSYN) 4.5 gram injection        Note to Pharmacy: Created by cabinet override    12/23 1614   12/23/22 0405        Cultures were taken-   Microbiology Results (last 7 days)     Procedure Component Value Units Date/Time    Blood culture [[id number]] Collected: 12/28/22 1058    Order Status: Completed Specimen: Blood Updated: 01/02/23 1812     Blood Culture, Routine No growth after 5 days.    Blood culture [[id number]] Collected: 12/28/22 1058    Order Status: Completed Specimen: Blood Updated: 01/02/23 1812     Blood Culture, Routine No growth after 5 days.        Latest lactate reviewed, they are-  No results for input(s): LACTATE in the last 72 hours.    Organ dysfunction indicated by Acute kidney injury, Encephalopathy  and Acute liver injury  Source- ?    Source control Achieved by- see orders  - Merrem day 7 of 7 (completed on 1/3/23)  - Blood cx x2 (1/2/23) NGTD    1/7 resolved

## 2023-01-07 NOTE — PT/OT/SLP PROGRESS
Occupational Therapy   Treatment    Name: Rachel Zazueta  MRN: [id number]  Admitting Diagnosis:  Debility       Recommendations:     Discharge Recommendations: other (see comments) (TBD)  Discharge Equipment Recommendations:  other (see comments) (TBD)  Barriers to discharge:  Other (Comment) (Medical and functional status)    Assessment:     Rachel Zazueta is a 42 y.o. female with a medical diagnosis of Debility.  She presents with severe pain with any sitting or standing activity. Performance deficits affecting function are weakness, impaired endurance, impaired self care skills, impaired functional mobility, gait instability, impaired balance, decreased safety awareness, pain.     Rehab Prognosis:  Fair; patient would benefit from acute skilled OT services to address these deficits and reach maximum level of function.       Plan:     Patient to be seen 5 x/week to address the above listed problems via self-care/home management, therapeutic activities, therapeutic exercises  Plan of Care Expires: 01/09/23  Plan of Care Reviewed with: patient    Subjective     Pain/Comfort:  Pain Rating 1: 8/10  Location - Side 1: Bilateral  Location - Orientation 1: lower  Location 1: back  Pain Addressed 1: Reposition, Cessation of Activity, Nurse notified  Pain Rating Post-Intervention 1: 10/10    Objective:     Communicated with: nurse prior to session.  Patient found supine with PureWick upon OT entry to room.    General Precautions: Standard, fall, contact    Orthopedic Precautions:N/A  Braces: N/A  Respiratory Status: Room air     Occupational Performance:     Bed Mobility:    Patient completed Rolling/Turning to Right with minimum assistance  Patient completed Scooting/Bridging with moderate assistance  Patient completed Supine to Sit with moderate assistance  Patient completed Sit to Supine with moderate assistance     Functional Mobility/Transfers:  Patient completed Sit <> Stand Transfer with maximal assistance   with  rolling walker   Functional Mobility: Pt unable to ambulate on this date.    Treatment & Education:  Pt was cooperative with continuous verbal encouragement secondary to severe pain and anxiety with sitting or standing activity. She performed bed mobility requiring mod assist secondary to some lifting assist and stabilization of trunk during supine to sit transition, scooting assist at (L) hip to EOB, and lifting assist of bilateral LE's during sit to supine transition with min-mod verbal and tactile cueing for technique to reduce pain. Pt then participated in therapeutic activity addressing functional reaching, gross motor coordination, trunk control, static / dynamic sitting balance, and energy for task / endurance challenging her to reach in multiple planes utilizing bilateral Ue's requiring verbal and tactile cueing to facilitate proper weight shifting and sitting posture tolerating 14.75 min at EOB. Also, she performed sit <> stand transition requiring max assist secondary to much lifting, lowering, and steadying assist, but performance impacted by increased pain and anxiety with standing limiting her performance and tolerance.    Patient left supine with all lines intact, call button in reach, and nurse notified    GOALS:   Multidisciplinary Problems       Occupational Therapy Goals          Problem: Occupational Therapy    Goal Priority Disciplines Outcome Interventions   Occupational Therapy Goal     OT, PT/OT Ongoing, Progressing    Description: Goals to be met by: 01/02/22     Patient will increase functional independence with ADLs by performing:    UE Dressing with Set-up Assistance.  LE Dressing with Minimal Assistance.  Grooming while seated with Modified [place].  Toileting from toilet with Supervision for hygiene and clothing management.   Bathing from  shower chair/bench with Minimal Assistance.  Step transfer with Minimal Assistance  Toilet transfer to toilet with Minimal  Assistance.  Increased functional strength to 4/5 for bilateral UE's.                         Time Tracking:     OT Date of Treatment: 01/07/23  OT Start Time: 1450  OT Stop Time: 1521  OT Total Time (min): 31 min    Billable Minutes:Therapeutic Activity 31    OT/ROSS: OT     ROSS Visit Number: 5    1/7/2023

## 2023-01-07 NOTE — ASSESSMENT & PLAN NOTE
Found incidentally on echocardiogram with evidence of elevated right sided pressures and dilated heart chambers on the right side. Unclear the exact etiology or class of Pulm HTN but given her significant substance abuse Class 1 certainly is a possibility.   - Will refer to outpatient pulmonologist  - Will consider a cariology consult while inpatient to see if they think it would be beneficial to have a heart cath done while inpatient

## 2023-01-07 NOTE — ASSESSMENT & PLAN NOTE
Refill request for metoprolol succinate (TOPROL XL) 25 MG extended release tablet. If appropriate please send medication(s) to patients pharmacy. Next appt: Patient is currently on wait list for provider. Last appt: 10/5/2022    Health Maintenance   Topic Date Due    COVID-19 Vaccine (5 - Booster for Moderna series) 11/15/2022    Lipids  11/15/2022    DTaP/Tdap/Td vaccine (1 - Tdap) 03/29/2023 (Originally 1/28/2006)    Shingles vaccine (2 of 3) 03/29/2023 (Originally 2/26/2011)    Annual Wellness Visit (AWV)  01/08/2023    Depression Screen  10/05/2023    DEXA (modify frequency per FRAX score)  Completed    Flu vaccine  Completed    Pneumococcal 65+ years Vaccine  Completed    Hepatitis A vaccine  Aged Out    Hib vaccine  Aged Out    Meningococcal (ACWY) vaccine  Aged Out       Hemoglobin A1C (%)   Date Value   03/29/2022 6.9   11/15/2021 7.4   07/08/2021 8.3             ( goal A1C is < 7)   Microalb/Crt.  Ratio (mcg/mg creat)   Date Value   06/19/2018 CANNOT BE CALCULATED     LDL Cholesterol (mg/dL)   Date Value   11/15/2021 136 (H)       (goal LDL is <100)   AST (U/L)   Date Value   12/25/2020 21     ALT (U/L)   Date Value   12/25/2020 8     BUN (mg/dL)   Date Value   03/30/2022 25 (H)     BP Readings from Last 3 Encounters:   10/05/22 (!) 144/72   08/15/22 (!) 154/97   03/29/22 (!) 159/76          (goal 120/80)          Patient Active Problem List:     Type 2 diabetes mellitus with hyperglycemia, without long-term current use of insulin (HCC)     Astigmatism, regular     Background diabetic retinopathy ([address]     Essential hypertension     Eczema     Generalized osteoarthritis     Hyperlipidemia     Hypertensive retinopathy of both eyes     Nonsmoker     Pseudophakia     Systolic murmur     Temporary cerebral vascular dysfunction     Pneumonia due to COVID-19 virus     Acute hypoxemic respiratory failure (HCC)     Elevated C-reactive protein (CRP) Resume home meds.   - lyrica discontinued  - continue gabapentin 300 mg TID

## 2023-01-07 NOTE — PLAN OF CARE
Problem: Occupational Therapy  Goal: Occupational Therapy Goal  Description: Goals to be met by: 01/02/22     Patient will increase functional independence with ADLs by performing:    UE Dressing with Set-up Assistance.  LE Dressing with Minimal Assistance.  Grooming while seated with Modified [place].  Toileting from toilet with Supervision for hygiene and clothing management.   Bathing from  shower chair/bench with Minimal Assistance.  Step transfer with Minimal Assistance  Toilet transfer to toilet with Minimal Assistance.  Increased functional strength to 4/5 for bilateral UE's.    Outcome: Ongoing, Progressing

## 2023-01-07 NOTE — PROGRESS NOTES
"[facility] [place] [facility]  Progress Note    Patient Name: Rachel Zazueta  MRN: [id number]  Patient Class: IP- Inpatient   Admission Date: 12/23/2022  Length of Stay: 14 days  Attending Physician: Dannielle Merino DO  Primary Care Provider: Dannielle Merino DO        Subjective:     Principal Problem:Debility  HPI:  ER HPI:  42-year-old female with a history of anemia, anxiety, recurrent cellulitis, hepatitis C, IVDU, liver cirrhosis, cholecystectomy, kidney stones, lumbar fusion, shoulder surgery, chronic back pain comes in c/o generalized weakness, fatigue, and bilateral flank pain.  She reports that this has been going on intermittently for several days and was diagnosed with a UTI recently.  No fever.  No abdominal pain or vomiting or diarrhea.     IM HPI:  Patient is well known to our service.  Patient has a history of IV drug abuse and unfortunately has relapsed.  Patient has a history of a epidural abscess requiring a large lumbar surgery with multiple rehabilitative stays and therapy.  The patient was progressing to ambulation and recently has declined.  Patient admits to starting drugs including IV drug use again.  Patient presented to the ED with the above symptoms and she has a noticeable cardiac murmur today that we are getting a stat echocardiogram.  Patient's been started on antibiotics fluid resuscitated and seems to have sepsis.  Patient's urinary studies were abnormal but not overwhelming.      Overview/Hospital Course:  12/24/22 CG: Patient remains in the ICU today. Has a lot of muscle spasms and low back pain. Echocardiogram ordered yesterday and completed; significant for pulm HTN but without vegetations.   12/25/22 CG:  Overnight she was having a lot of significant discomfort and pain.  We placed her on Precedex and this has helped significantly with her body aches, her irritability and muscle spasms."  12/26/22 FM:  Patient required Precedex for agitation and mood changes.  " Patient is calm and cooperative this morning.  Patient appears to have E coli sepsis so will taper antibiotics.  Patient's echocardiogram showed no vegetation and E coli would be low risk for metastatic infection.  Will transfer the patient to the floor once her Precedex discontinue we counseled her again today on the importance of avoiding substances and illicit drugs.  This continues to be setback for her.  12/27/22 WC:  Patient overall remains stable and is slowly improving.  Urine culture has revealed Candida albicans in addition to blood culture revealing E coli.  12/28/22 WC:  Patient resting comfortably this morning.  Pharm ID on case for treatment duration recs.    12/29/22 WC:  no acute events overnight.  Back pain at this time.   12/20/22:  no acute events continue IV ABX for esbl  12/31/22 CG: stable, continuing antibiotics.   1/1/2 CG: stable, continuing antibiotics. Has not had a bowel movement in two weeks. Will give lactulose.    01/02/2023: Patient did have a bowel movement yesterday.  Completing last day of antimicrobial therapy anticipated discharge tomorrow.  1/3/2023: Improving BM with lactulose. Lower extremity weakness requiring aggressive PT and OT efforts. Patient understands she needs to be an active participant. Encouraged inbed physical exercises without assistance with therapists. Stable Hg/HCT.   1/4/2023: Lactulose every other day for regular bowel movement. PT and OT recommendations with continue efforts to help with lower extremity weakness which likely precipitated from minimal participation from patient over the past week to remain physically active. Hg/HCT remains stable. Last blood Cx x2 (12/28) NGTD x5 days. Patient agrees moving forward lower extremity strengthening will have to take place with her participation and motivation. Will discharge with continue PT and OT efforts outpatient. She acknowledges outpatient follow up with GI/hepatologist, hematologist and neurologist  required to address all other chronic medication conditions.   1/5/2023: Overnight report from nursing that patient exhibited weakness in lower extremities and in upper body strength to support self to safely transfer from bed to commode or even bed to wheelchair. CM involved in placement to SNF. Continue with PT and OT. All other medical condition addressed during hospitalization remains stable.  1/6/2023: CM working on SNF placement. Patient expresses continue effort on her part to regain her strength, reassured her that she has been able to participate following the leads of PT and OT and so a new injury is less likely at this time. While small, she endorsed sitting up at bedside with legs dangle.        Interval History: Patient seen and examined.     Review of Systems   Constitutional:  Negative for activity change, appetite change, chills, diaphoresis, fatigue and fever.   HENT:  Negative for sore throat.    Eyes:  Negative for pain.   Respiratory:  Negative for cough, choking, chest tightness and shortness of breath.    Cardiovascular:  Negative for chest pain, palpitations and leg swelling.   Gastrointestinal:  Negative for abdominal pain, constipation, diarrhea, nausea, rectal pain and vomiting.   Genitourinary:  Negative for dyspareunia, dysuria, flank pain, frequency, genital sores, hematuria and menstrual problem.   Musculoskeletal:  Positive for arthralgias, back pain, gait problem, myalgias and neck pain.   Skin:  Negative for pallor, rash and wound.   Neurological:  Positive for weakness. Negative for dizziness, tremors, speech difficulty and headaches.   Psychiatric/Behavioral:  Negative for agitation, behavioral problems, confusion, decreased concentration and dysphoric mood.    Objective:     Vital Signs (Most Recent):  Temp: 98 °F (36.7 °C) (01/06/23 1618)  Pulse: 88 (01/06/23 1618)  Resp: 20 (01/06/23 1711)  BP: 118/61 (01/06/23 1618)  SpO2: 100 % (01/06/23 1618) Vital Signs (24h Range):  Temp:   [97.6 °F (36.4 °C)-98.2 °F (36.8 °C)] 98 °F (36.7 °C)  Pulse:  [88-96] 88  Resp:  [16-20] 20  SpO2:  [99 %-100 %] 100 %  BP: (118-128)/(55-61) 118/61     Weight: 97.5 kg (215 lb)  Body mass index is 33.67 kg/m².    Intake/Output Summary (Last 24 hours) at 1/6/2023 1921  Last data filed at 1/6/2023 1827  Gross per 24 hour   Intake 2160 ml   Output 1700 ml   Net 460 ml      Physical Exam  Vitals and nursing note reviewed.   Constitutional:       General: She is not in acute distress.     Appearance: She is obese. She is ill-appearing.   HENT:      Head: Normocephalic and atraumatic.      Right Ear: External ear normal.      Left Ear: External ear normal.      Nose: Nose normal. No congestion or rhinorrhea.      Mouth/Throat:      Mouth: Mucous membranes are dry.      Pharynx: No oropharyngeal exudate.   Eyes:      General: No scleral icterus.     Extraocular Movements: Extraocular movements intact.      Conjunctiva/sclera: Conjunctivae normal.   Neck:      Vascular: No carotid bruit.      Comments: Limited ROM at baseline  Cardiovascular:      Rate and Rhythm: Normal rate and regular rhythm.      Heart sounds: Murmur heard.     No friction rub. No gallop.   Pulmonary:      Effort: No respiratory distress.      Breath sounds: No stridor. No wheezing, rhonchi or rales.   Chest:      Chest wall: No tenderness.   Abdominal:      General: There is no distension.      Palpations: Abdomen is soft. There is no mass.      Tenderness: There is no abdominal tenderness. There is no right CVA tenderness, left CVA tenderness or guarding.   Musculoskeletal:         General: No swelling, tenderness or deformity.      Cervical back: Normal range of motion and neck supple. No rigidity or tenderness.      Right lower leg: Edema present.      Left lower leg: Edema present.   Lymphadenopathy:      Cervical: No cervical adenopathy.   Skin:     Capillary Refill: Capillary refill takes less than 2 seconds.      Coloration: Skin is not  jaundiced or pale.   Neurological:      Mental Status: She is oriented to person, place, and time. Mental status is at baseline.      Cranial Nerves: No cranial nerve deficit.      Sensory: Sensory deficit present.      Motor: Weakness present.   Psychiatric:         Mood and Affect: Mood normal.         Behavior: Behavior normal.      Comments: Calm, cooperative, moving all extremities.     Significant Labs: All pertinent labs within the past 24 hours have been reviewed.  None    Significant Imaging: I have reviewed all pertinent imaging results/findings within the past 24 hours.      Assessment/Plan:      * Debility  Lower extremity weakness bilaterally now requiring aggressive OT and PT efforts to prevent further weakness and also regain strength necessary to carry out ADLs.   - From early bed bound state, patient has required multiple nursing staff in safely getting in and out of bed   - Original discharge plans with outpatient rehab will cause more harm to patient at this time and case management working to find a placement at SNF with PT and OT efforts      Pulmonary hypertension  Found incidentally on echocardiogram with evidence of elevated right sided pressures and dilated heart chambers on the right side. Unclear the exact etiology or class of Pulm HTN but given her significant substance abuse Class 1 certainly is a possibility.   - Will refer to outpatient pulmonologist  - Will consider a cariology consult while inpatient to see if they think it would be beneficial to have a heart cath done while inpatient      Severe sepsis  This patient does have evidence of infective focus  My overall impression is sepsis. Vital signs were reviewed and noted in progress note.  Antibiotics given-   Antibiotics (From admission, onward)    Start     Stop Route Frequency Ordered    01/02/23 1517  meropenem (MERREM) 1 g in sodium chloride 0.9 % 100 mL IVPB (MB+)        See Hyperspace for full Linked Orders Report.    01/03  0714 IV Every 8 hours (non-standard times) 01/02/23 0923    12/23/22 0533  vancomycin (VANCOCIN) 1,000 mg injection        Note to Pharmacy: Created by cabinet override    12/23 1744   12/23/22 0533    12/23/22 0405  piperacillin-tazobactam (ZOSYN) 4.5 gram injection        Note to Pharmacy: Created by cabinet override    12/23 1614   12/23/22 0405        Cultures were taken-   Microbiology Results (last 7 days)     Procedure Component Value Units Date/Time    Blood culture [[id number]] Collected: 12/28/22 1058    Order Status: Completed Specimen: Blood Updated: 01/02/23 1812     Blood Culture, Routine No growth after 5 days.    Blood culture [[id number]] Collected: 12/28/22 1058    Order Status: Completed Specimen: Blood Updated: 01/02/23 1812     Blood Culture, Routine No growth after 5 days.        Latest lactate reviewed, they are-  No results for input(s): LACTATE in the last 72 hours.    Organ dysfunction indicated by Acute kidney injury, Encephalopathy  and Acute liver injury  Source- ?    Source control Achieved by- see orders  - Merrem day 7 of 7 (completed on 1/3/23)  - Blood cx x2 (1/2/23) NGTD      Murmur, cardiac  No evidence of vegetations.     Mild episode of recurrent major depressive disorder  Resume home meds.   - lyrica discontinued  - continue gabapentin 300 mg TID      Cannabis use disorder, moderate, dependence  As above      Amphetamine use disorder, severe  Continue to  educate and support      Spinal stenosis at L4-L5 level        Substance use disorder  Cessation advised. Follow up consult psychiatry.     Chronic hepatitis C with cirrhosis  Labs noted, will require follow up outpatient with GI to start treatment.       Cirrhosis of liver without ascites  Labs noted, stable.  AST   Date Value Ref Range Status   01/04/2023 40 10 - 40 U/L Final   01/03/2023 37 10 - 40 U/L Final   01/02/2023 37 10 - 40 U/L Final   01/01/2023 34 10 - 40 U/L Final   12/31/2022 34 10 - 40 U/L Final     ALT    Date Value Ref Range Status   01/04/2023 17 10 - 44 U/L Final   01/03/2023 16 10 - 44 U/L Final   01/02/2023 16 10 - 44 U/L Final   01/01/2023 16 10 - 44 U/L Final   12/31/2022 16 10 - 44 U/L Final        Alkaline Phosphatase   Date Value Ref Range Status   01/04/2023 130 55 - 135 U/L Final   01/03/2023 130 55 - 135 U/L Final   01/02/2023 136 (H) 55 - 135 U/L Final   01/01/2023 125 55 - 135 U/L Final   12/31/2022 126 55 - 135 U/L Final              VTE Risk Mitigation (From admission, onward)         Ordered     IP VTE HIGH RISK PATIENT  Once         12/23/22 0647     Place sequential compression device  Until discontinued         12/23/22 0647                Discharge Planning   SHIRA: 1/4/2023     Code Status: Full Code   Is the patient medically ready for discharge?:     Reason for patient still in hospital (select all that apply): PT / OT recommendations and Pending disposition  Discharge Plan A: Skilled Nursing Facility   Discharge Delays: None known at this time              Dannielle Merino DO  Department of Hospital Medicine   [facility]

## 2023-01-07 NOTE — ASSESSMENT & PLAN NOTE
Labs noted, stable.  AST   Date Value Ref Range Status   01/07/2023 42 (H) 10 - 40 U/L Final   01/04/2023 40 10 - 40 U/L Final   01/03/2023 37 10 - 40 U/L Final   01/02/2023 37 10 - 40 U/L Final   01/01/2023 34 10 - 40 U/L Final     ALT   Date Value Ref Range Status   01/07/2023 18 10 - 44 U/L Final   01/04/2023 17 10 - 44 U/L Final   01/03/2023 16 10 - 44 U/L Final   01/02/2023 16 10 - 44 U/L Final   01/01/2023 16 10 - 44 U/L Final        Alkaline Phosphatase   Date Value Ref Range Status   01/07/2023 138 (H) 55 - 135 U/L Final   01/04/2023 130 55 - 135 U/L Final   01/03/2023 130 55 - 135 U/L Final   01/02/2023 136 (H) 55 - 135 U/L Final   01/01/2023 125 55 - 135 U/L Final

## 2023-01-08 ENCOUNTER — PATIENT MESSAGE (OUTPATIENT)
Dept: NEUROSURGERY | Facility: CLINIC | Age: 43
End: 2023-01-08
Payer: MEDICAID

## 2023-01-08 LAB
ALBUMIN SERPL BCP-MCNC: 1.7 G/DL (ref 3.5–5.2)
ALP SERPL-CCNC: 135 U/L (ref 55–135)
ALT SERPL W/O P-5'-P-CCNC: 18 U/L (ref 10–44)
ANION GAP SERPL CALC-SCNC: 4 MMOL/L (ref 8–16)
AST SERPL-CCNC: 37 U/L (ref 10–40)
BASOPHILS # BLD AUTO: 0.02 K/UL (ref 0–0.2)
BASOPHILS NFR BLD: 0.8 % (ref 0–1.9)
BILIRUB SERPL-MCNC: 0.9 MG/DL (ref 0.1–1)
BUN SERPL-MCNC: 14 MG/DL (ref 6–20)
CALCIUM SERPL-MCNC: 7.8 MG/DL (ref 8.7–10.5)
CHLORIDE SERPL-SCNC: 107 MMOL/L (ref 95–110)
CO2 SERPL-SCNC: 28 MMOL/L (ref 23–29)
CREAT SERPL-MCNC: 0.4 MG/DL (ref 0.5–1.4)
DIFFERENTIAL METHOD: ABNORMAL
EOSINOPHIL # BLD AUTO: 0.1 K/UL (ref 0–0.5)
EOSINOPHIL NFR BLD: 3.3 % (ref 0–8)
ERYTHROCYTE [DISTWIDTH] IN BLOOD BY AUTOMATED COUNT: 17.2 % (ref 11.5–14.5)
EST. GFR  (NO RACE VARIABLE): >60 ML/MIN/1.73 M^2
GLUCOSE SERPL-MCNC: 107 MG/DL (ref 70–110)
HCT VFR BLD AUTO: 27.9 % (ref 37–48.5)
HGB BLD-MCNC: 8.8 G/DL (ref 12–16)
IMM GRANULOCYTES # BLD AUTO: 0.01 K/UL (ref 0–0.04)
IMM GRANULOCYTES NFR BLD AUTO: 0.4 % (ref 0–0.5)
LYMPHOCYTES # BLD AUTO: 0.5 K/UL (ref 1–4.8)
LYMPHOCYTES NFR BLD: 20.1 % (ref 18–48)
MAGNESIUM SERPL-MCNC: 1.8 MG/DL (ref 1.6–2.6)
MCH RBC QN AUTO: 27.7 PG (ref 27–31)
MCHC RBC AUTO-ENTMCNC: 31.5 G/DL (ref 32–36)
MCV RBC AUTO: 88 FL (ref 82–98)
MONOCYTES # BLD AUTO: 0.3 K/UL (ref 0.3–1)
MONOCYTES NFR BLD: 11.3 % (ref 4–15)
NEUTROPHILS # BLD AUTO: 1.5 K/UL (ref 1.8–7.7)
NEUTROPHILS NFR BLD: 64.1 % (ref 38–73)
NRBC BLD-RTO: 0 /100 WBC
PLATELET # BLD AUTO: 90 K/UL (ref 150–450)
PMV BLD AUTO: 10.5 FL (ref 9.2–12.9)
POTASSIUM SERPL-SCNC: 4.1 MMOL/L (ref 3.5–5.1)
PROT SERPL-MCNC: 7.1 G/DL (ref 6–8.4)
RBC # BLD AUTO: 3.18 M/UL (ref 4–5.4)
SODIUM SERPL-SCNC: 139 MMOL/L (ref 136–145)
WBC # BLD AUTO: 2.39 K/UL (ref 3.9–12.7)

## 2023-01-08 PROCEDURE — 80053 COMPREHEN METABOLIC PANEL: CPT | Performed by: INTERNAL MEDICINE

## 2023-01-08 PROCEDURE — 11000001 HC ACUTE MED/SURG PRIVATE ROOM

## 2023-01-08 PROCEDURE — 36415 COLL VENOUS BLD VENIPUNCTURE: CPT | Performed by: INTERNAL MEDICINE

## 2023-01-08 PROCEDURE — 27000207 HC ISOLATION

## 2023-01-08 PROCEDURE — 83735 ASSAY OF MAGNESIUM: CPT | Performed by: INTERNAL MEDICINE

## 2023-01-08 PROCEDURE — 85025 COMPLETE CBC W/AUTO DIFF WBC: CPT | Performed by: INTERNAL MEDICINE

## 2023-01-08 PROCEDURE — 25000003 PHARM REV CODE 250: Performed by: PSYCHIATRY & NEUROLOGY

## 2023-01-08 PROCEDURE — 25000003 PHARM REV CODE 250: Performed by: INTERNAL MEDICINE

## 2023-01-08 RX ADMIN — GABAPENTIN 300 MG: 300 CAPSULE ORAL at 09:01

## 2023-01-08 RX ADMIN — BUPROPION HYDROCHLORIDE 100 MG: 100 TABLET, FILM COATED ORAL at 09:01

## 2023-01-08 RX ADMIN — HYDROCODONE BITARTRATE AND ACETAMINOPHEN 1 TABLET: 5; 325 TABLET ORAL at 05:01

## 2023-01-08 RX ADMIN — METHOCARBAMOL TABLETS 500 MG: 500 TABLET, COATED ORAL at 09:01

## 2023-01-08 RX ADMIN — HYDROCODONE BITARTRATE AND ACETAMINOPHEN 1 TABLET: 5; 325 TABLET ORAL at 10:01

## 2023-01-08 RX ADMIN — PANTOPRAZOLE SODIUM 40 MG: 40 TABLET, DELAYED RELEASE ORAL at 09:01

## 2023-01-08 RX ADMIN — METHOCARBAMOL TABLETS 500 MG: 500 TABLET, COATED ORAL at 05:01

## 2023-01-08 RX ADMIN — ALPRAZOLAM 0.5 MG: 0.5 TABLET ORAL at 05:01

## 2023-01-08 RX ADMIN — GABAPENTIN 300 MG: 300 CAPSULE ORAL at 02:01

## 2023-01-08 RX ADMIN — METHOCARBAMOL TABLETS 500 MG: 500 TABLET, COATED ORAL at 01:01

## 2023-01-08 NOTE — SUBJECTIVE & OBJECTIVE
Interval History: Patient seen and examined.     Review of Systems   Constitutional:  Negative for activity change, appetite change, chills, diaphoresis, fatigue and fever.   HENT:  Negative for sore throat.    Eyes:  Negative for pain.   Respiratory:  Negative for cough, choking, chest tightness and shortness of breath.    Cardiovascular:  Negative for chest pain, palpitations and leg swelling.   Gastrointestinal:  Negative for abdominal pain, constipation, diarrhea, nausea, rectal pain and vomiting.   Genitourinary:  Negative for dyspareunia, dysuria, flank pain, frequency, genital sores, hematuria and menstrual problem.   Musculoskeletal:  Positive for arthralgias, back pain, gait problem, myalgias and neck pain.   Skin:  Negative for pallor, rash and wound.   Neurological:  Positive for weakness. Negative for dizziness, tremors, speech difficulty and headaches.   Psychiatric/Behavioral:  Negative for agitation, behavioral problems, confusion, decreased concentration and dysphoric mood.    Objective:     Vital Signs (Most Recent):  Temp: 98.6 °F (37 °C) (01/08/23 1125)  Pulse: 104 (01/08/23 1125)  Resp: 20 (01/08/23 1125)  BP: (!) 114/57 (01/08/23 1125)  SpO2: 97 % (01/08/23 1125) Vital Signs (24h Range):  Temp:  [97.5 °F (36.4 °C)-98.8 °F (37.1 °C)] 98.6 °F (37 °C)  Pulse:  [] 104  Resp:  [16-20] 20  SpO2:  [96 %-100 %] 97 %  BP: (107-128)/(55-80) 114/57     Weight: 97.5 kg (215 lb)  Body mass index is 33.67 kg/m².    Intake/Output Summary (Last 24 hours) at 1/8/2023 1255  Last data filed at 1/8/2023 0649  Gross per 24 hour   Intake 985 ml   Output 1800 ml   Net -815 ml        Physical Exam  Vitals and nursing note reviewed.   Constitutional:       General: She is not in acute distress.     Appearance: She is obese.   HENT:      Head: Normocephalic and atraumatic.      Right Ear: External ear normal.      Left Ear: External ear normal.      Nose: Nose normal. No congestion or rhinorrhea.      Mouth/Throat:       Mouth: Mucous membranes are moist.      Pharynx: No oropharyngeal exudate.   Eyes:      General: No scleral icterus.     Extraocular Movements: Extraocular movements intact.      Conjunctiva/sclera: Conjunctivae normal.   Neck:      Vascular: No carotid bruit.      Comments: Limited ROM at baseline  Cardiovascular:      Rate and Rhythm: Normal rate and regular rhythm.      Heart sounds: Murmur heard.     No friction rub. No gallop.   Pulmonary:      Effort: No respiratory distress.      Breath sounds: No stridor. No wheezing, rhonchi or rales.   Chest:      Chest wall: No tenderness.   Abdominal:      General: There is no distension.      Palpations: Abdomen is soft. There is no mass.      Tenderness: There is no abdominal tenderness. There is no right CVA tenderness, left CVA tenderness or guarding.   Musculoskeletal:         General: No swelling, tenderness or deformity.      Cervical back: Normal range of motion and neck supple. No rigidity or tenderness.      Right lower leg: Edema present.      Left lower leg: Edema present.   Lymphadenopathy:      Cervical: No cervical adenopathy.   Skin:     Capillary Refill: Capillary refill takes less than 2 seconds.      Coloration: Skin is not jaundiced or pale.   Neurological:      Mental Status: She is oriented to person, place, and time. Mental status is at baseline.      Cranial Nerves: No cranial nerve deficit.      Sensory: Sensory deficit present.      Motor: Weakness present.   Psychiatric:         Mood and Affect: Mood normal.         Behavior: Behavior normal.      Comments: Calm, cooperative, moving all extremities.     Significant Labs: All pertinent labs within the past 24 hours have been reviewed.  None    Significant Imaging: I have reviewed all pertinent imaging results/findings within the past 24 hours.   70

## 2023-01-08 NOTE — ASSESSMENT & PLAN NOTE
This patient does have evidence of infective focus  My overall impression is sepsis. Vital signs were reviewed and noted in progress note.  Antibiotics given-   Antibiotics (From admission, onward)    Start     Stop Route Frequency Ordered    01/02/23 1515  meropenem (MERREM) 1 g in sodium chloride 0.9 % 100 mL IVPB (MB+)        See Hyperspace for full Linked Orders Report.    01/03 0714 IV Every 8 hours (non-standard times) 01/02/23 0923    12/23/22 0533  vancomycin (VANCOCIN) 1,000 mg injection        Note to Pharmacy: Created by cabinet override    12/23 1744   12/23/22 0533    12/23/22 0405  piperacillin-tazobactam (ZOSYN) 4.5 gram injection        Note to Pharmacy: Created by cabinet override    12/23 1614   12/23/22 0405        Cultures were taken-   Microbiology Results (last 7 days)     Procedure Component Value Units Date/Time    Blood culture [[id number]] Collected: 12/28/22 1058    Order Status: Completed Specimen: Blood Updated: 01/02/23 1812     Blood Culture, Routine No growth after 5 days.    Blood culture [[id number]] Collected: 12/28/22 1058    Order Status: Completed Specimen: Blood Updated: 01/02/23 1812     Blood Culture, Routine No growth after 5 days.        Latest lactate reviewed, they are-  No results for input(s): LACTATE in the last 72 hours.    Organ dysfunction indicated by Acute kidney injury, Encephalopathy  and Acute liver injury  Source- ?    Source control Achieved by- see orders  - Merrem day 7 of 7 (completed on 1/3/23)  - Blood cx x2 (1/2/23) NGTD    1/7 resolved

## 2023-01-08 NOTE — PROGRESS NOTES
"[facility]  Progress Note    Patient Name: Rachel Zazueta  MRN: [id number]  Patient Class: IP- Inpatient   Admission Date: 12/23/2022  Length of Stay: 16 days  Attending Physician: Dannielle Merino DO  Primary Care Provider: Dannielle Merion DO        Subjective:     Principal Problem:Debility        HPI:  ER HPI:  42-year-old female with a history of anemia, anxiety, recurrent cellulitis, hepatitis C, IVDU, liver cirrhosis, cholecystectomy, kidney stones, lumbar fusion, shoulder surgery, chronic back pain comes in c/o generalized weakness, fatigue, and bilateral flank pain.  She reports that this has been going on intermittently for several days and was diagnosed with a UTI recently.  No fever.  No abdominal pain or vomiting or diarrhea.     IM HPI:  Patient is well known to our service.  Patient has a history of IV drug abuse and unfortunately has relapsed.  Patient has a history of a epidural abscess requiring a large lumbar surgery with multiple rehabilitative stays and therapy.  The patient was progressing to ambulation and recently has declined.  Patient admits to starting drugs including IV drug use again.  Patient presented to the ED with the above symptoms and she has a noticeable cardiac murmur today that we are getting a stat echocardiogram.  Patient's been started on antibiotics fluid resuscitated and seems to have sepsis.  Patient's urinary studies were abnormal but not overwhelming.      Overview/Hospital Course:  12/24/22 CG: Patient remains in the ICU today. Has a lot of muscle spasms and low back pain. Echocardiogram ordered yesterday and completed; significant for pulm HTN but without vegetations.   12/25/22 CG:  Overnight she was having a lot of significant discomfort and pain.  We placed her on Precedex and this has helped significantly with her body aches, her irritability and muscle spasms."  12/26/22 FM:  Patient required Precedex for agitation and mood " changes.  Patient is calm and cooperative this morning.  Patient appears to have E coli sepsis so will taper antibiotics.  Patient's echocardiogram showed no vegetation and E coli would be low risk for metastatic infection.  Will transfer the patient to the floor once her Precedex discontinue we counseled her again today on the importance of avoiding substances and illicit drugs.  This continues to be setback for her.  12/27/22 WC:  Patient overall remains stable and is slowly improving.  Urine culture has revealed Candida albicans in addition to blood culture revealing E coli.  12/28/22 WC:  Patient resting comfortably this morning.  Pharm ID on case for treatment duration recs.    12/29/22 WC:  no acute events overnight.  Back pain at this time.   12/20/22:  no acute events continue IV ABX for esbl  12/31/22 CG: stable, continuing antibiotics.   1/1/2 CG: stable, continuing antibiotics. Has not had a bowel movement in two weeks. Will give lactulose.    01/02/2023: Patient did have a bowel movement yesterday.  Completing last day of antimicrobial therapy anticipated discharge tomorrow.  1/3/2023: Improving BM with lactulose. Lower extremity weakness requiring aggressive PT and OT efforts. Patient understands she needs to be an active participant. Encouraged inbed physical exercises without assistance with therapists. Stable Hg/HCT.   1/4/2023: Lactulose every other day for regular bowel movement. PT and OT recommendations with continue efforts to help with lower extremity weakness which likely precipitated from minimal participation from patient over the past week to remain physically active. Hg/HCT remains stable. Last blood Cx x2 (12/28) NGTD x5 days. Patient agrees moving forward lower extremity strengthening will have to take place with her participation and motivation. Will discharge with continue PT and OT efforts outpatient. She acknowledges outpatient follow up with GI/hepatologist, hematologist and  neurologist required to address all other chronic medication conditions.   1/5/2023: Overnight report from nursing that patient exhibited weakness in lower extremities and in upper body strength to support self to safely transfer from bed to commode or even bed to wheelchair. CM involved in placement to SNF. Continue with PT and OT. All other medical condition addressed during hospitalization remains stable.  1/6/2023: CM working on SNF placement. Patient expresses continue effort on her part to regain her strength, reassured her that she has been able to participate following the leads of PT and OT and so a new injury is less likely at this time. While small, she endorsed sitting up at bedside with legs dangle.    1/7/23 ND patient feeling well this morning no complaints will continue therapy services and rehab placement  1/8/23 ND pt reports some back pain will repeat x-rays to make sure everything looks okay from previous surgery.  Otherwise she is doing well      Interval History: Patient seen and examined.     Review of Systems   Constitutional:  Negative for activity change, appetite change, chills, diaphoresis, fatigue and fever.   HENT:  Negative for sore throat.    Eyes:  Negative for pain.   Respiratory:  Negative for cough, choking, chest tightness and shortness of breath.    Cardiovascular:  Negative for chest pain, palpitations and leg swelling.   Gastrointestinal:  Negative for abdominal pain, constipation, diarrhea, nausea, rectal pain and vomiting.   Genitourinary:  Negative for dyspareunia, dysuria, flank pain, frequency, genital sores, hematuria and menstrual problem.   Musculoskeletal:  Positive for arthralgias, back pain, gait problem, myalgias and neck pain.   Skin:  Negative for pallor, rash and wound.   Neurological:  Positive for weakness. Negative for dizziness, tremors, speech difficulty and headaches.   Psychiatric/Behavioral:  Negative for agitation, behavioral problems, confusion,  decreased concentration and dysphoric mood.    Objective:     Vital Signs (Most Recent):  Temp: 98.6 °F (37 °C) (01/08/23 1125)  Pulse: 104 (01/08/23 1125)  Resp: 20 (01/08/23 1125)  BP: (!) 114/57 (01/08/23 1125)  SpO2: 97 % (01/08/23 1125) Vital Signs (24h Range):  Temp:  [97.5 °F (36.4 °C)-98.8 °F (37.1 °C)] 98.6 °F (37 °C)  Pulse:  [] 104  Resp:  [16-20] 20  SpO2:  [96 %-100 %] 97 %  BP: (107-128)/(55-80) 114/57     Weight: 97.5 kg (215 lb)  Body mass index is 33.67 kg/m².    Intake/Output Summary (Last 24 hours) at 1/8/2023 1255  Last data filed at 1/8/2023 0649  Gross per 24 hour   Intake 985 ml   Output 1800 ml   Net -815 ml        Physical Exam  Vitals and nursing note reviewed.   Constitutional:       General: She is not in acute distress.     Appearance: She is obese.   HENT:      Head: Normocephalic and atraumatic.      Right Ear: External ear normal.      Left Ear: External ear normal.      Nose: Nose normal. No congestion or rhinorrhea.      Mouth/Throat:      Mouth: Mucous membranes are moist.      Pharynx: No oropharyngeal exudate.   Eyes:      General: No scleral icterus.     Extraocular Movements: Extraocular movements intact.      Conjunctiva/sclera: Conjunctivae normal.   Neck:      Vascular: No carotid bruit.      Comments: Limited ROM at baseline  Cardiovascular:      Rate and Rhythm: Normal rate and regular rhythm.      Heart sounds: Murmur heard.     No friction rub. No gallop.   Pulmonary:      Effort: No respiratory distress.      Breath sounds: No stridor. No wheezing, rhonchi or rales.   Chest:      Chest wall: No tenderness.   Abdominal:      General: There is no distension.      Palpations: Abdomen is soft. There is no mass.      Tenderness: There is no abdominal tenderness. There is no right CVA tenderness, left CVA tenderness or guarding.   Musculoskeletal:         General: No swelling, tenderness or deformity.      Cervical back: Normal range of motion and neck supple. No  rigidity or tenderness.      Right lower leg: Edema present.      Left lower leg: Edema present.   Lymphadenopathy:      Cervical: No cervical adenopathy.   Skin:     Capillary Refill: Capillary refill takes less than 2 seconds.      Coloration: Skin is not jaundiced or pale.   Neurological:      Mental Status: She is oriented to person, place, and time. Mental status is at baseline.      Cranial Nerves: No cranial nerve deficit.      Sensory: Sensory deficit present.      Motor: Weakness present.   Psychiatric:         Mood and Affect: Mood normal.         Behavior: Behavior normal.      Comments: Calm, cooperative, moving all extremities.     Significant Labs: All pertinent labs within the past 24 hours have been reviewed.  None    Significant Imaging: I have reviewed all pertinent imaging results/findings within the past 24 hours.      Assessment/Plan:      * Debility  Lower extremity weakness bilaterally now requiring aggressive OT and PT efforts to prevent further weakness and also regain strength necessary to carry out ADLs.   - From early bed bound state, patient has required multiple nursing staff in safely getting in and out of bed   - Original discharge plans with outpatient rehab will cause more harm to patient at this time and case management working to find a placement at SNF with PT and OT efforts      Pulmonary hypertension  Found incidentally on echocardiogram with evidence of elevated right sided pressures and dilated heart chambers on the right side. Unclear the exact etiology or class of Pulm HTN but given her significant substance abuse Class 1 certainly is a possibility.   - Will refer to outpatient pulmonologist  - Will consider a cariology consult while inpatient to see if they think it would be beneficial to have a heart cath done while inpatient      Severe sepsis  This patient does have evidence of infective focus  My overall impression is sepsis. Vital signs were reviewed and noted in  progress note.  Antibiotics given-   Antibiotics (From admission, onward)    Start     Stop Route Frequency Ordered    01/02/23 1515  meropenem (MERREM) 1 g in sodium chloride 0.9 % 100 mL IVPB (MB+)        See Hyperspace for full Linked Orders Report.    01/03 0714 IV Every 8 hours (non-standard times) 01/02/23 0923    12/23/22 0533  vancomycin (VANCOCIN) 1,000 mg injection        Note to Pharmacy: Created by cabinet override    12/23 1744   12/23/22 0533    12/23/22 0405  piperacillin-tazobactam (ZOSYN) 4.5 gram injection        Note to Pharmacy: Created by cabinet override    12/23 1614   12/23/22 0405        Cultures were taken-   Microbiology Results (last 7 days)     Procedure Component Value Units Date/Time    Blood culture [[id number]] Collected: 12/28/22 1058    Order Status: Completed Specimen: Blood Updated: 01/02/23 1812     Blood Culture, Routine No growth after 5 days.    Blood culture [[id number]] Collected: 12/28/22 1058    Order Status: Completed Specimen: Blood Updated: 01/02/23 1812     Blood Culture, Routine No growth after 5 days.        Latest lactate reviewed, they are-  No results for input(s): LACTATE in the last 72 hours.    Organ dysfunction indicated by Acute kidney injury, Encephalopathy  and Acute liver injury  Source- ?    Source control Achieved by- see orders  - Merrem day 7 of 7 (completed on 1/3/23)  - Blood cx x2 (1/2/23) NGTD    1/7 resolved    Murmur, cardiac  No evidence of vegetations.     Mild episode of recurrent major depressive disorder  Resume home meds.   - lyrica discontinued  - continue gabapentin 300 mg TID      Cannabis use disorder, moderate, dependence  As above      Amphetamine use disorder, severe  Continue to  educate and support      Spinal stenosis at L4-L5 level  Check xray of back due to pain    Substance use disorder  Cessation advised. Follow up consult psychiatry.     Chronic hepatitis C with cirrhosis  Labs noted, will require follow up outpatient  with GI to start treatment.       Cirrhosis of liver without ascites  Labs noted, stable.  AST   Date Value Ref Range Status   01/08/2023 37 10 - 40 U/L Final   01/07/2023 42 (H) 10 - 40 U/L Final   01/04/2023 40 10 - 40 U/L Final   01/03/2023 37 10 - 40 U/L Final   01/02/2023 37 10 - 40 U/L Final     ALT   Date Value Ref Range Status   01/08/2023 18 10 - 44 U/L Final   01/07/2023 18 10 - 44 U/L Final   01/04/2023 17 10 - 44 U/L Final   01/03/2023 16 10 - 44 U/L Final   01/02/2023 16 10 - 44 U/L Final        Alkaline Phosphatase   Date Value Ref Range Status   01/08/2023 135 55 - 135 U/L Final   01/07/2023 138 (H) 55 - 135 U/L Final   01/04/2023 130 55 - 135 U/L Final   01/03/2023 130 55 - 135 U/L Final   01/02/2023 136 (H) 55 - 135 U/L Final            Pancytopenia  Counts are stable at this time        VTE Risk Mitigation (From admission, onward)         Ordered     IP VTE HIGH RISK PATIENT  Once         12/23/22 0647     Place sequential compression device  Until discontinued         12/23/22 0647                Discharge Planning   SHIRA: 1/4/2023     Code Status: Full Code   Is the patient medically ready for discharge?:     Reason for patient still in hospital (select all that apply): Treatment  Discharge Plan A: Skilled Nursing Facility   Discharge Delays: None known at this time              hSannon Oliveira MD  Department of Hospital Medicine   [facility]

## 2023-01-08 NOTE — ASSESSMENT & PLAN NOTE
Labs noted, stable.  AST   Date Value Ref Range Status   01/08/2023 37 10 - 40 U/L Final   01/07/2023 42 (H) 10 - 40 U/L Final   01/04/2023 40 10 - 40 U/L Final   01/03/2023 37 10 - 40 U/L Final   01/02/2023 37 10 - 40 U/L Final     ALT   Date Value Ref Range Status   01/08/2023 18 10 - 44 U/L Final   01/07/2023 18 10 - 44 U/L Final   01/04/2023 17 10 - 44 U/L Final   01/03/2023 16 10 - 44 U/L Final   01/02/2023 16 10 - 44 U/L Final        Alkaline Phosphatase   Date Value Ref Range Status   01/08/2023 135 55 - 135 U/L Final   01/07/2023 138 (H) 55 - 135 U/L Final   01/04/2023 130 55 - 135 U/L Final   01/03/2023 130 55 - 135 U/L Final   01/02/2023 136 (H) 55 - 135 U/L Final

## 2023-01-09 ENCOUNTER — PATIENT MESSAGE (OUTPATIENT)
Dept: ADMINISTRATIVE | Facility: HOSPITAL | Age: 43
End: 2023-01-09
Payer: MEDICAID

## 2023-01-09 LAB
ALBUMIN SERPL BCP-MCNC: 1.8 G/DL (ref 3.5–5.2)
ALP SERPL-CCNC: 121 U/L (ref 55–135)
ALT SERPL W/O P-5'-P-CCNC: 18 U/L (ref 10–44)
ANION GAP SERPL CALC-SCNC: 4 MMOL/L (ref 8–16)
AST SERPL-CCNC: 38 U/L (ref 10–40)
BASOPHILS # BLD AUTO: 0.02 K/UL (ref 0–0.2)
BASOPHILS NFR BLD: 1.1 % (ref 0–1.9)
BILIRUB SERPL-MCNC: 1 MG/DL (ref 0.1–1)
BUN SERPL-MCNC: 13 MG/DL (ref 6–20)
CALCIUM SERPL-MCNC: 8 MG/DL (ref 8.7–10.5)
CHLORIDE SERPL-SCNC: 106 MMOL/L (ref 95–110)
CO2 SERPL-SCNC: 28 MMOL/L (ref 23–29)
CREAT SERPL-MCNC: 0.4 MG/DL (ref 0.5–1.4)
DIFFERENTIAL METHOD: ABNORMAL
EOSINOPHIL # BLD AUTO: 0.1 K/UL (ref 0–0.5)
EOSINOPHIL NFR BLD: 2.8 % (ref 0–8)
ERYTHROCYTE [DISTWIDTH] IN BLOOD BY AUTOMATED COUNT: 16.9 % (ref 11.5–14.5)
EST. GFR  (NO RACE VARIABLE): >60 ML/MIN/1.73 M^2
GLUCOSE SERPL-MCNC: 94 MG/DL (ref 70–110)
HCT VFR BLD AUTO: 27.5 % (ref 37–48.5)
HGB BLD-MCNC: 8.9 G/DL (ref 12–16)
IMM GRANULOCYTES # BLD AUTO: 0.01 K/UL (ref 0–0.04)
IMM GRANULOCYTES NFR BLD AUTO: 0.6 % (ref 0–0.5)
LYMPHOCYTES # BLD AUTO: 0.4 K/UL (ref 1–4.8)
LYMPHOCYTES NFR BLD: 22.7 % (ref 18–48)
MAGNESIUM SERPL-MCNC: 1.8 MG/DL (ref 1.6–2.6)
MCH RBC QN AUTO: 28.2 PG (ref 27–31)
MCHC RBC AUTO-ENTMCNC: 32.4 G/DL (ref 32–36)
MCV RBC AUTO: 87 FL (ref 82–98)
MONOCYTES # BLD AUTO: 0.2 K/UL (ref 0.3–1)
MONOCYTES NFR BLD: 13.6 % (ref 4–15)
NEUTROPHILS # BLD AUTO: 1 K/UL (ref 1.8–7.7)
NEUTROPHILS NFR BLD: 59.2 % (ref 38–73)
NRBC BLD-RTO: 0 /100 WBC
PLATELET # BLD AUTO: 74 K/UL (ref 150–450)
PMV BLD AUTO: 10.8 FL (ref 9.2–12.9)
POTASSIUM SERPL-SCNC: 4.1 MMOL/L (ref 3.5–5.1)
PROT SERPL-MCNC: 7 G/DL (ref 6–8.4)
RBC # BLD AUTO: 3.16 M/UL (ref 4–5.4)
SODIUM SERPL-SCNC: 138 MMOL/L (ref 136–145)
WBC # BLD AUTO: 1.76 K/UL (ref 3.9–12.7)

## 2023-01-09 PROCEDURE — 25000003 PHARM REV CODE 250: Performed by: STUDENT IN AN ORGANIZED HEALTH CARE EDUCATION/TRAINING PROGRAM

## 2023-01-09 PROCEDURE — 36415 COLL VENOUS BLD VENIPUNCTURE: CPT | Performed by: INTERNAL MEDICINE

## 2023-01-09 PROCEDURE — 25000003 PHARM REV CODE 250: Performed by: INTERNAL MEDICINE

## 2023-01-09 PROCEDURE — 97530 THERAPEUTIC ACTIVITIES: CPT | Mod: CO

## 2023-01-09 PROCEDURE — 25000003 PHARM REV CODE 250: Performed by: PSYCHIATRY & NEUROLOGY

## 2023-01-09 PROCEDURE — 97110 THERAPEUTIC EXERCISES: CPT | Mod: CO

## 2023-01-09 PROCEDURE — 99232 SBSQ HOSP IP/OBS MODERATE 35: CPT | Mod: ,,, | Performed by: STUDENT IN AN ORGANIZED HEALTH CARE EDUCATION/TRAINING PROGRAM

## 2023-01-09 PROCEDURE — 83735 ASSAY OF MAGNESIUM: CPT | Performed by: INTERNAL MEDICINE

## 2023-01-09 PROCEDURE — 80053 COMPREHEN METABOLIC PANEL: CPT | Performed by: INTERNAL MEDICINE

## 2023-01-09 PROCEDURE — 99232 PR SUBSEQUENT HOSPITAL CARE,LEVL II: ICD-10-PCS | Mod: ,,, | Performed by: STUDENT IN AN ORGANIZED HEALTH CARE EDUCATION/TRAINING PROGRAM

## 2023-01-09 PROCEDURE — 27000207 HC ISOLATION

## 2023-01-09 PROCEDURE — 25000003 PHARM REV CODE 250: Performed by: EMERGENCY MEDICINE

## 2023-01-09 PROCEDURE — 85025 COMPLETE CBC W/AUTO DIFF WBC: CPT | Performed by: INTERNAL MEDICINE

## 2023-01-09 PROCEDURE — 97530 THERAPEUTIC ACTIVITIES: CPT

## 2023-01-09 PROCEDURE — 11000001 HC ACUTE MED/SURG PRIVATE ROOM

## 2023-01-09 RX ORDER — ALPRAZOLAM 0.25 MG/1
0.25 TABLET ORAL 3 TIMES DAILY PRN
Status: DISCONTINUED | OUTPATIENT
Start: 2023-01-09 | End: 2023-01-17

## 2023-01-09 RX ORDER — ONDANSETRON 4 MG/1
4 TABLET, ORALLY DISINTEGRATING ORAL EVERY 8 HOURS PRN
Status: DISPENSED | OUTPATIENT
Start: 2023-01-09 | End: 2023-01-12

## 2023-01-09 RX ADMIN — IBUPROFEN 400 MG: 400 TABLET, FILM COATED ORAL at 09:01

## 2023-01-09 RX ADMIN — ONDANSETRON 4 MG: 4 TABLET, ORALLY DISINTEGRATING ORAL at 06:01

## 2023-01-09 RX ADMIN — GABAPENTIN 300 MG: 300 CAPSULE ORAL at 09:01

## 2023-01-09 RX ADMIN — HYDROCODONE BITARTRATE AND ACETAMINOPHEN 1 TABLET: 5; 325 TABLET ORAL at 01:01

## 2023-01-09 RX ADMIN — METHOCARBAMOL TABLETS 500 MG: 500 TABLET, COATED ORAL at 10:01

## 2023-01-09 RX ADMIN — METHOCARBAMOL TABLETS 500 MG: 500 TABLET, COATED ORAL at 09:01

## 2023-01-09 RX ADMIN — HYDROCODONE BITARTRATE AND ACETAMINOPHEN 1 TABLET: 5; 325 TABLET ORAL at 06:01

## 2023-01-09 RX ADMIN — ALPRAZOLAM 0.5 MG: 0.5 TABLET ORAL at 01:01

## 2023-01-09 RX ADMIN — METHOCARBAMOL TABLETS 500 MG: 500 TABLET, COATED ORAL at 05:01

## 2023-01-09 RX ADMIN — HYDROCODONE BITARTRATE AND ACETAMINOPHEN 1 TABLET: 5; 325 TABLET ORAL at 11:01

## 2023-01-09 RX ADMIN — METHOCARBAMOL TABLETS 500 MG: 500 TABLET, COATED ORAL at 02:01

## 2023-01-09 RX ADMIN — GABAPENTIN 300 MG: 300 CAPSULE ORAL at 05:01

## 2023-01-09 RX ADMIN — GABAPENTIN 300 MG: 300 CAPSULE ORAL at 10:01

## 2023-01-09 RX ADMIN — BUPROPION HYDROCHLORIDE 100 MG: 100 TABLET, FILM COATED ORAL at 09:01

## 2023-01-09 RX ADMIN — ALPRAZOLAM 0.5 MG: 0.5 TABLET ORAL at 10:01

## 2023-01-09 RX ADMIN — PANTOPRAZOLE SODIUM 40 MG: 40 TABLET, DELAYED RELEASE ORAL at 10:01

## 2023-01-09 RX ADMIN — BUPROPION HYDROCHLORIDE 100 MG: 100 TABLET, FILM COATED ORAL at 10:01

## 2023-01-09 NOTE — PT/OT/SLP PROGRESS
"Physical Therapy Treatment    Patient Name:  Rachel Zazueta   MRN:  [id number]    Recommendations:     Discharge Recommendations:  (TBD)  Discharge Equipment Recommendations:  (TBD)  Barriers to discharge: None    Assessment:     Rachel Zazueta is a 42 y.o. female admitted with a medical diagnosis of Debility.  She presents with the following impairments/functional limitations: weakness, impaired endurance, decreased ROM, impaired self care skills, impaired functional mobility, decreased lower extremity function, pain leading to functional independence deficits. Patient is able to participate in treatment this visit, but significant pain is noted throughout, and no decrease in pain was noted. She reports feeling "better" in terms of pain following cessation of treatment. Patient was determined to perform supine to sit and sit EOB this visit, and she was able to perform with Max A. She reported significant pain throughout in her low back. Patient became emotional and fearful of her pain in her low back and inability to perform mobility without assistance.     Rehab Prognosis: Fair; patient would benefit from acute skilled PT services to address these deficits and reach maximum level of function.    Recent Surgery: * No surgery found *      Plan:     During this hospitalization, patient to be seen 5 x/week to address the identified rehab impairments via gait training, therapeutic activities, therapeutic exercises and progress toward the following goals:    Plan of Care Expires:  01/16/23    Subjective     Chief Complaint: Low Back pain   Patient/Family Comments/goals: return to PLOF  Pain/Comfort:  Pain Rating 1: 10/10  Location - Side 1: Bilateral  Location - Orientation 1: lower  Location 1: back  Pain Addressed 1: Reposition, Cessation of Activity  Pain Rating Post-Intervention 1: 10/10      Objective:     Communicated with patient prior to session.  Patient found HOB elevated with PureWick upon PT entry to " room.     General Precautions: Standard, fall, contact  Orthopedic Precautions: N/A  Braces: N/A  Respiratory Status: Room air     Functional Mobility:  Bed Mobility:     Rolling Right: maximal assistance  Supine to Sit: maximal assistance  Sit to Supine: maximal assistance  Transfers:     Sit to Stand:  Unable  with Unable      AM-PAC 6 CLICK MOBILITY  Turning over in bed (including adjusting bedclothes, sheets and blankets)?: 2  Sitting down on and standing up from a chair with arms (e.g., wheelchair, bedside commode, etc.): 1  Moving from lying on back to sitting on the side of the bed?: 2  Moving to and from a bed to a chair (including a wheelchair)?: 1  Need to walk in hospital room?: 1  Climbing 3-5 steps with a railing?: 1  Basic Mobility Total Score: 8       Treatment & Education:  Patient performed bed mobility and supine<>sit this visit. Significant pain noted throughout. Required Max A throughout treatment     Patient left HOB elevated with all lines intact and call button in reach..    GOALS:   Multidisciplinary Problems       Physical Therapy Goals          Problem: Physical Therapy    Goal Priority Disciplines Outcome Goal Variances Interventions   Physical Therapy Goal     PT, PT/OT Ongoing, Progressing     Description: Goals to be met by: 2023     Patient will increase functional independence with mobility by performin. Supine to sit with Modified [place]  2. Sit to supine with Modified [place]  3. Sit to stand transfer with Stand-by Assistance  4. Bed to chair transfer with Stand-by Assistance using Rolling Walker  5. Gait  x 50 feet with Contact Guard Assistance using Rolling Walker.   6. Sitting at edge of bed x10 minutes with [place] to perform (B) LE therex                           Time Tracking:     PT Received On: 23  PT Start Time: 1125     PT Stop Time: 1150  PT Total Time (min): 25 min     Billable Minutes: Therapeutic Activity 25 minutes    Treatment Type:  Treatment  PT/PTA: PT           01/09/2023

## 2023-01-09 NOTE — PLAN OF CARE
Problem: Occupational Therapy  Goal: Occupational Therapy Goal  Description: Goals to be met by: 01/02/22     Patient will increase functional independence with ADLs by performing:    UE Dressing with Set-up Assistance.  LE Dressing with Minimal Assistance.  Grooming while seated with Modified [place].  Toileting from toilet with Supervision for hygiene and clothing management.   Bathing from  shower chair/bench with Minimal Assistance.  Step transfer with Minimal Assistance  Toilet transfer to toilet with Minimal Assistance.  Increased functional strength to 4/5 for bilateral UE's.    Outcome: Ongoing, Progressing   Continue with POC

## 2023-01-09 NOTE — PLAN OF CARE
Problem: Physical Therapy  Goal: Physical Therapy Goal  Description: Goals to be met by: 2023     Patient will increase functional independence with mobility by performin. Supine to sit with Modified [place]  2. Sit to supine with Modified [place]  3. Sit to stand transfer with Stand-by Assistance  4. Bed to chair transfer with Stand-by Assistance using Rolling Walker  5. Gait  x 50 feet with Contact Guard Assistance using Rolling Walker.   6. Sitting at edge of bed x10 minutes with [place] to perform (B) LE therex      Outcome: Ongoing, Progressing

## 2023-01-09 NOTE — PLAN OF CARE
Problem: Physical Therapy  Goal: Physical Therapy Goal  Description: Goals to be met by: 2023     Patient will increase functional independence with mobility by performin. Supine to sit with Modified [place]  2. Sit to supine with Modified [place]  3. Sit to stand transfer with Stand-by Assistance  4. Bed to chair transfer with Stand-by Assistance using Rolling Walker  5. Gait  x 50 feet with Contact Guard Assistance using Rolling Walker.   6. Sitting at edge of bed x10 minutes with [place] to perform (B) LE therex      Outcome: Ongoing, Progressing      page  [phone]/peds ENT/[facility], Ambulatory Surgical Unit

## 2023-01-09 NOTE — PLAN OF CARE
Called medicaid's UR department to inquire about the status of  the SNF authorization. The rep informed that they did not receive any referral from the post-acute facility ([facility]). I spoke to Mady, the Admissions Coordinator, with [employer]. She stated that she submitted for the authorization on Friday, and she was under the impression that everything was completed on her end. However, she attempted to follow-up on the referral and there was a delay on her end trying to request information.     Mady stated that the individual she tried to contact was in the middle of an audit, so she was unable to get information to proceed with the referral. Mady informed me that she was going to try to resubmit for the prior auth. Will continue to monitor.

## 2023-01-09 NOTE — PROGRESS NOTES
"[facility]  Progress Note    Patient Name: Rachel Zazueta  MRN: [id number]  Patient Class: IP- Inpatient   Admission Date: 12/23/2022  Length of Stay: 17 days  Attending Physician: Dannielle Merino DO  Primary Care Provider: Dannielle Merino DO        Subjective:     Principal Problem:Debility    HPI:  ER HPI:  42-year-old female with a history of anemia, anxiety, recurrent cellulitis, hepatitis C, IVDU, liver cirrhosis, cholecystectomy, kidney stones, lumbar fusion, shoulder surgery, chronic back pain comes in c/o generalized weakness, fatigue, and bilateral flank pain.  She reports that this has been going on intermittently for several days and was diagnosed with a UTI recently.  No fever.  No abdominal pain or vomiting or diarrhea.     IM HPI:  Patient is well known to our service.  Patient has a history of IV drug abuse and unfortunately has relapsed.  Patient has a history of a epidural abscess requiring a large lumbar surgery with multiple rehabilitative stays and therapy.  The patient was progressing to ambulation and recently has declined.  Patient admits to starting drugs including IV drug use again.  Patient presented to the ED with the above symptoms and she has a noticeable cardiac murmur today that we are getting a stat echocardiogram.  Patient's been started on antibiotics fluid resuscitated and seems to have sepsis.  Patient's urinary studies were abnormal but not overwhelming.      Overview/Hospital Course:  12/24/22 CG: Patient remains in the ICU today. Has a lot of muscle spasms and low back pain. Echocardiogram ordered yesterday and completed; significant for pulm HTN but without vegetations.   12/25/22 CG:  Overnight she was having a lot of significant discomfort and pain.  We placed her on Precedex and this has helped significantly with her body aches, her irritability and muscle spasms."  12/26/22 FM:  Patient required Precedex for agitation and mood " changes.  Patient is calm and cooperative this morning.  Patient appears to have E coli sepsis so will taper antibiotics.  Patient's echocardiogram showed no vegetation and E coli would be low risk for metastatic infection.  Will transfer the patient to the floor once her Precedex discontinue we counseled her again today on the importance of avoiding substances and illicit drugs.  This continues to be setback for her.  12/27/22 WC:  Patient overall remains stable and is slowly improving.  Urine culture has revealed Candida albicans in addition to blood culture revealing E coli.  12/28/22 WC:  Patient resting comfortably this morning.  Pharm ID on case for treatment duration recs.    12/29/22 WC:  no acute events overnight.  Back pain at this time.   12/20/22:  no acute events continue IV ABX for esbl  12/31/22 CG: stable, continuing antibiotics.   1/1/2 CG: stable, continuing antibiotics. Has not had a bowel movement in two weeks. Will give lactulose.    01/02/2023: Patient did have a bowel movement yesterday.  Completing last day of antimicrobial therapy anticipated discharge tomorrow.  1/3/2023: Improving BM with lactulose. Lower extremity weakness requiring aggressive PT and OT efforts. Patient understands she needs to be an active participant. Encouraged inbed physical exercises without assistance with therapists. Stable Hg/HCT.   1/4/2023: Lactulose every other day for regular bowel movement. PT and OT recommendations with continue efforts to help with lower extremity weakness which likely precipitated from minimal participation from patient over the past week to remain physically active. Hg/HCT remains stable. Last blood Cx x2 (12/28) NGTD x5 days. Patient agrees moving forward lower extremity strengthening will have to take place with her participation and motivation. Will discharge with continue PT and OT efforts outpatient. She acknowledges outpatient follow up with GI/hepatologist, hematologist and  neurologist required to address all other chronic medication conditions.   1/5/2023: Overnight report from nursing that patient exhibited weakness in lower extremities and in upper body strength to support self to safely transfer from bed to commode or even bed to wheelchair. CM involved in placement to SNF. Continue with PT and OT. All other medical condition addressed during hospitalization remains stable.  1/6/2023: CM working on SNF placement. Patient expresses continue effort on her part to regain her strength, reassured her that she has been able to participate following the leads of PT and OT and so a new injury is less likely at this time. While small, she endorsed sitting up at bedside with legs dangle.    1/7/23 ND patient feeling well this morning no complaints will continue therapy services and rehab placement  1/8/23 ND pt reports some back pain will repeat x-rays to make sure everything looks okay from previous surgery.  Otherwise she is doing well  1/9/23 Repeat xray showing increased kyphosis over TL junction. Today, pain associated with massaging feet bilaterally with no worsening of numbness and tingling. No reported concerns with PT and OT efforts. Awaiting placement.      Interval History: Patient seen and examined.     Review of Systems   Constitutional:  Negative for activity change, appetite change, chills, diaphoresis, fatigue and fever.   HENT:  Negative for sore throat.    Eyes:  Negative for pain.   Respiratory:  Negative for cough, choking, chest tightness and shortness of breath.    Cardiovascular:  Negative for chest pain, palpitations and leg swelling.   Gastrointestinal:  Negative for abdominal pain, constipation, diarrhea, nausea, rectal pain and vomiting.   Genitourinary:  Negative for dyspareunia, dysuria, flank pain, frequency, genital sores, hematuria and menstrual problem.   Musculoskeletal:  Positive for arthralgias, back pain, gait problem, myalgias and neck pain.   Skin:   Negative for pallor, rash and wound.   Neurological:  Positive for weakness. Negative for dizziness, tremors, speech difficulty and headaches.   Psychiatric/Behavioral:  Negative for agitation, behavioral problems, confusion, decreased concentration and dysphoric mood.    Objective:     Vital Signs (Most Recent):  Temp: 98 °F (36.7 °C) (01/09/23 0715)  Pulse: 92 (01/09/23 0715)  Resp: 18 (01/09/23 0715)  BP: 127/65 (01/09/23 0715)  SpO2: 96 % (01/09/23 0715) Vital Signs (24h Range):  Temp:  [97.4 °F (36.3 °C)-99 °F (37.2 °C)] 98 °F (36.7 °C)  Pulse:  [] 92  Resp:  [16-20] 18  SpO2:  [96 %-100 %] 96 %  BP: (112-127)/(57-66) 127/65     Weight: 97.5 kg (215 lb)  Body mass index is 33.67 kg/m².    Intake/Output Summary (Last 24 hours) at 1/9/2023 0903  Last data filed at 1/9/2023 0741  Gross per 24 hour   Intake 1545 ml   Output 1550 ml   Net -5 ml      Physical Exam  Vitals and nursing note reviewed.   Constitutional:       General: She is not in acute distress.     Appearance: She is obese.   HENT:      Head: Normocephalic and atraumatic.      Right Ear: External ear normal.      Left Ear: External ear normal.      Nose: Nose normal. No congestion or rhinorrhea.      Mouth/Throat:      Mouth: Mucous membranes are moist.      Pharynx: No oropharyngeal exudate.   Eyes:      General: No scleral icterus.     Extraocular Movements: Extraocular movements intact.      Conjunctiva/sclera: Conjunctivae normal.   Neck:      Vascular: No carotid bruit.      Comments: Limited ROM at baseline  Cardiovascular:      Rate and Rhythm: Normal rate and regular rhythm.      Heart sounds: Murmur heard.     No friction rub. No gallop.   Pulmonary:      Effort: No respiratory distress.      Breath sounds: No stridor. No wheezing, rhonchi or rales.   Chest:      Chest wall: No tenderness.   Abdominal:      General: There is no distension.      Palpations: Abdomen is soft. There is no mass.      Tenderness: There is no abdominal  tenderness. There is no right CVA tenderness, left CVA tenderness or guarding.   Musculoskeletal:         General: No swelling, tenderness or deformity.      Cervical back: Normal range of motion and neck supple. No rigidity or tenderness.      Right lower leg: No edema.      Left lower leg: No edema.   Lymphadenopathy:      Cervical: No cervical adenopathy.   Skin:     Capillary Refill: Capillary refill takes less than 2 seconds.      Coloration: Skin is not jaundiced or pale.   Neurological:      Mental Status: She is oriented to person, place, and time. Mental status is at baseline.      Cranial Nerves: No cranial nerve deficit.      Sensory: Sensory deficit present.      Motor: Weakness present.      Comments: Dorsiflexion and plantarflexion at ankles, intact and equal bilaterally, minimally tender to motion   Psychiatric:         Mood and Affect: Mood normal.         Behavior: Behavior normal.      Comments: Calm, cooperative, moving all extremities.       Significant Labs: All pertinent labs within the past 24 hours have been reviewed.  BMP:   Recent Labs   Lab 01/09/23  0538   GLU 94      K 4.1      CO2 28   BUN 13   CREATININE 0.4*   CALCIUM 8.0*   MG 1.8     CBC:   Recent Labs   Lab 01/07/23  0933 01/08/23  0529 01/09/23  0538   WBC 1.82* 2.39* 1.76*   HGB 9.7* 8.8* 8.9*   HCT 31.0* 27.9* 27.5*   PLT 79* 90* 74*     CMP:   Recent Labs   Lab 01/07/23  0933 01/08/23  0529 01/09/23  0538    139 138   K 4.1 4.1 4.1    107 106   CO2 30* 28 28    107 94   BUN 13 14 13   CREATININE 0.5 0.4* 0.4*   CALCIUM 8.0* 7.8* 8.0*   PROT 7.5 7.1 7.0   ALBUMIN 1.9* 1.7* 1.8*   BILITOT 1.0 0.9 1.0   ALKPHOS 138* 135 121   AST 42* 37 38   ALT 18 18 18   ANIONGAP 2* 4* 4*     X-Ray Spine 1 View Any Level  Narrative: EXAMINATION:  XR SPINE 1 VIEW ANY LEVEL    CLINICAL HISTORY:  back pain;    COMPARISON:  Scoliosis series 04/14/2022.    FINDINGS:  Lumbar spine radiographs, AP and lateral views  demonstrate multilevel fusion T10 through the sacrum.  Hardware appears intact with no evidence of loosening.  There is multilevel discectomy.  No fracture.  Impression: Multilevel thoracolumbar fusion and discectomy.    Electronically signed by: Hal Mckeon MD  Date:    01/08/2023  Time:    14:40  X-Ray Spine 1 View Any Level  Narrative: EXAMINATION:  XR SPINE 1 VIEW ANY LEVEL    CLINICAL HISTORY:  back pain;    COMPARISON:  Scoliosis series 03/07/2022.    FINDINGS:  Thoracic spine radiographs, three views, demonstrate multilevel thoracolumbar fusion extending from T10 into the visualized lumbar spine..  There is a focal kyphosis at T9-10, increased when compared to scoliosis series of 04/14/2022, measuring approximately 40 degree on today's exam and 30° on prior exam.  Impression: A focal kyphosis at T9-10, increased when compared to prior scoliosis radiographs of 04/14/2022.    Electronically signed by: Hal Mckeon MD  Date:    01/08/2023  Time:    14:38     Significant Imaging: I have reviewed all pertinent imaging results/findings within the past 24 hours.      Assessment/Plan:      * Debility  Lower extremity weakness bilaterally now requiring aggressive OT and PT efforts to prevent further weakness and also regain strength necessary to carry out ADLs.   - From early bed bound state, patient has required multiple nursing staff in safely getting in and out of bed   - Original discharge plans with outpatient rehab will cause more harm to patient at this time and case management working to find a placement at SNF with PT and OT efforts  1/9 continue efforts with PT and OT, awaiting placement      Pulmonary hypertension  Found incidentally on echocardiogram with evidence of elevated right sided pressures and dilated heart chambers on the right side. Unclear the exact etiology or class of Pulm HTN but given her significant substance abuse Class 1 certainly is a possibility.   - Will refer to outpatient  pulmonologist  - Will consider a cariology consult while inpatient to see if they think it would be beneficial to have a heart cath done while inpatient      Severe sepsis  This patient does have evidence of infective focus  My overall impression is sepsis. Vital signs were reviewed and noted in progress note.  Antibiotics given-   Antibiotics (From admission, onward)    Start     Stop Route Frequency Ordered    01/02/23 1515  meropenem (MERREM) 1 g in sodium chloride 0.9 % 100 mL IVPB (MB+)        [facility].    01/03 0714 IV Every 8 hours (non-standard times) 01/02/23 0923    12/23/22 0533  vancomycin (VANCOCIN) 1,000 mg injection        Note to Pharmacy: Created by cabinet override    12/23 1744   12/23/22 0533    12/23/22 0405  piperacillin-tazobactam (ZOSYN) 4.5 gram injection        Note to Pharmacy: Created by cabinet override    12/23 1614   12/23/22 0405        Cultures were taken-   Microbiology Results (last 7 days)     Procedure Component Value Units Date/Time    Blood culture [[id number]] Collected: 12/28/22 1058    Order Status: Completed Specimen: Blood Updated: 01/02/23 1812     Blood Culture, Routine No growth after 5 days.    Blood culture [[id number]] Collected: 12/28/22 1058    Order Status: Completed Specimen: Blood Updated: 01/02/23 1812     Blood Culture, Routine No growth after 5 days.        Latest lactate reviewed, they are-  No results for input(s): LACTATE in the last 72 hours.    Organ dysfunction indicated by Acute kidney injury, Encephalopathy  and Acute liver injury  Source- ?    Source control Achieved by- see orders  - Merrem day 7 of 7 (completed on 1/3/23)  - Blood cx x2 (1/2/23) NGTD    1/7 resolved    Murmur, cardiac  No evidence of vegetations.     Mild episode of recurrent major depressive disorder  Resume home meds.   - lyrica discontinued  - continue gabapentin 300 mg TID      Cannabis use disorder, moderate, dependence  As  above.      Amphetamine use disorder, severe  Continue to  educate and support      Spinal stenosis at L4-L5 level  Check xray of back due to pain    Substance use disorder  Cessation advised. Follow up consult psychiatry.     Chronic hepatitis C with cirrhosis  Labs noted, will require follow up outpatient with GI to start treatment.       Cirrhosis of liver without ascites  Labs noted, stable.  AST   Date Value Ref Range Status   01/09/2023 38 10 - 40 U/L Final   01/08/2023 37 10 - 40 U/L Final   01/07/2023 42 (H) 10 - 40 U/L Final   01/04/2023 40 10 - 40 U/L Final   01/03/2023 37 10 - 40 U/L Final     ALT   Date Value Ref Range Status   01/09/2023 18 10 - 44 U/L Final   01/08/2023 18 10 - 44 U/L Final   01/07/2023 18 10 - 44 U/L Final   01/04/2023 17 10 - 44 U/L Final   01/03/2023 16 10 - 44 U/L Final        Alkaline Phosphatase   Date Value Ref Range Status   01/09/2023 121 55 - 135 U/L Final   01/08/2023 135 55 - 135 U/L Final   01/07/2023 138 (H) 55 - 135 U/L Final   01/04/2023 130 55 - 135 U/L Final   01/03/2023 130 55 - 135 U/L Final            Pancytopenia  Counts are stable at this time.        VTE Risk Mitigation (From admission, onward)         Ordered     IP VTE HIGH RISK PATIENT  Once         12/23/22 0647     Place sequential compression device  Until discontinued         12/23/22 0647                Discharge Planning   SHIRA: 1/4/2023     Code Status: Full Code   Is the patient medically ready for discharge?:     Reason for patient still in hospital (select all that apply): PT / OT recommendations and Pending disposition  Discharge Plan A: Skilled Nursing Facility   Discharge Delays: None known at this time      Dannielle Merino DO  Department of Hospital Medicine   [facility]

## 2023-01-09 NOTE — SUBJECTIVE & OBJECTIVE
Interval History: Patient seen and examined.     Review of Systems   Constitutional:  Negative for activity change, appetite change, chills, diaphoresis, fatigue and fever.   HENT:  Negative for sore throat.    Eyes:  Negative for pain.   Respiratory:  Negative for cough, choking, chest tightness and shortness of breath.    Cardiovascular:  Negative for chest pain, palpitations and leg swelling.   Gastrointestinal:  Negative for abdominal pain, constipation, diarrhea, nausea, rectal pain and vomiting.   Genitourinary:  Negative for dyspareunia, dysuria, flank pain, frequency, genital sores, hematuria and menstrual problem.   Musculoskeletal:  Positive for arthralgias, back pain, gait problem, myalgias and neck pain.   Skin:  Negative for pallor, rash and wound.   Neurological:  Positive for weakness. Negative for dizziness, tremors, speech difficulty and headaches.   Psychiatric/Behavioral:  Negative for agitation, behavioral problems, confusion, decreased concentration and dysphoric mood.    Objective:     Vital Signs (Most Recent):  Temp: 98 °F (36.7 °C) (01/09/23 0715)  Pulse: 92 (01/09/23 0715)  Resp: 18 (01/09/23 0715)  BP: 127/65 (01/09/23 0715)  SpO2: 96 % (01/09/23 0715) Vital Signs (24h Range):  Temp:  [97.4 °F (36.3 °C)-99 °F (37.2 °C)] 98 °F (36.7 °C)  Pulse:  [] 92  Resp:  [16-20] 18  SpO2:  [96 %-100 %] 96 %  BP: (112-127)/(57-66) 127/65     Weight: 97.5 kg (215 lb)  Body mass index is 33.67 kg/m².    Intake/Output Summary (Last 24 hours) at 1/9/2023 0903  Last data filed at 1/9/2023 0741  Gross per 24 hour   Intake 1545 ml   Output 1550 ml   Net -5 ml      Physical Exam  Vitals and nursing note reviewed.   Constitutional:       General: She is not in acute distress.     Appearance: She is obese.   HENT:      Head: Normocephalic and atraumatic.      Right Ear: External ear normal.      Left Ear: External ear normal.      Nose: Nose normal. No congestion or rhinorrhea.      Mouth/Throat:       Mouth: Mucous membranes are moist.      Pharynx: No oropharyngeal exudate.   Eyes:      General: No scleral icterus.     Extraocular Movements: Extraocular movements intact.      Conjunctiva/sclera: Conjunctivae normal.   Neck:      Vascular: No carotid bruit.      Comments: Limited ROM at baseline  Cardiovascular:      Rate and Rhythm: Normal rate and regular rhythm.      Heart sounds: Murmur heard.     No friction rub. No gallop.   Pulmonary:      Effort: No respiratory distress.      Breath sounds: No stridor. No wheezing, rhonchi or rales.   Chest:      Chest wall: No tenderness.   Abdominal:      General: There is no distension.      Palpations: Abdomen is soft. There is no mass.      Tenderness: There is no abdominal tenderness. There is no right CVA tenderness, left CVA tenderness or guarding.   Musculoskeletal:         General: No swelling, tenderness or deformity.      Cervical back: Normal range of motion and neck supple. No rigidity or tenderness.      Right lower leg: No edema.      Left lower leg: No edema.   Lymphadenopathy:      Cervical: No cervical adenopathy.   Skin:     Capillary Refill: Capillary refill takes less than 2 seconds.      Coloration: Skin is not jaundiced or pale.   Neurological:      Mental Status: She is oriented to person, place, and time. Mental status is at baseline.      Cranial Nerves: No cranial nerve deficit.      Sensory: Sensory deficit present.      Motor: Weakness present.      Comments: Dorsiflexion and plantarflexion at ankles, intact and equal bilaterally, minimally tender to motion   Psychiatric:         Mood and Affect: Mood normal.         Behavior: Behavior normal.      Comments: Calm, cooperative, moving all extremities.       Significant Labs: All pertinent labs within the past 24 hours have been reviewed.  BMP:   Recent Labs   Lab 01/09/23  0538   GLU 94      K 4.1      CO2 28   BUN 13   CREATININE 0.4*   CALCIUM 8.0*   MG 1.8     CBC:   Recent Labs    Lab 01/07/23  0933 01/08/23  0529 01/09/23  0538   WBC 1.82* 2.39* 1.76*   HGB 9.7* 8.8* 8.9*   HCT 31.0* 27.9* 27.5*   PLT 79* 90* 74*     CMP:   Recent Labs   Lab 01/07/23  0933 01/08/23  0529 01/09/23  0538    139 138   K 4.1 4.1 4.1    107 106   CO2 30* 28 28    107 94   BUN 13 14 13   CREATININE 0.5 0.4* 0.4*   CALCIUM 8.0* 7.8* 8.0*   PROT 7.5 7.1 7.0   ALBUMIN 1.9* 1.7* 1.8*   BILITOT 1.0 0.9 1.0   ALKPHOS 138* 135 121   AST 42* 37 38   ALT 18 18 18   ANIONGAP 2* 4* 4*     X-Ray Spine 1 View Any Level  Narrative: EXAMINATION:  XR SPINE 1 VIEW ANY LEVEL    CLINICAL HISTORY:  back pain;    COMPARISON:  Scoliosis series 04/14/2022.    FINDINGS:  Lumbar spine radiographs, AP and lateral views demonstrate multilevel fusion T10 through the sacrum.  Hardware appears intact with no evidence of loosening.  There is multilevel discectomy.  No fracture.  Impression: Multilevel thoracolumbar fusion and discectomy.    Electronically signed by: Hal Mckeon MD  Date:    01/08/2023  Time:    14:40  X-Ray Spine 1 View Any Level  Narrative: EXAMINATION:  XR SPINE 1 VIEW ANY LEVEL    CLINICAL HISTORY:  back pain;    COMPARISON:  Scoliosis series 03/07/2022.    FINDINGS:  Thoracic spine radiographs, three views, demonstrate multilevel thoracolumbar fusion extending from T10 into the visualized lumbar spine..  There is a focal kyphosis at T9-10, increased when compared to scoliosis series of 04/14/2022, measuring approximately 40 degree on today's exam and 30° on prior exam.  Impression: A focal kyphosis at T9-10, increased when compared to prior scoliosis radiographs of 04/14/2022.    Electronically signed by: Hal Mckeon MD  Date:    01/08/2023  Time:    14:38     Significant Imaging: I have reviewed all pertinent imaging results/findings within the past 24 hours.

## 2023-01-09 NOTE — ASSESSMENT & PLAN NOTE
Labs noted, stable.  AST   Date Value Ref Range Status   01/09/2023 38 10 - 40 U/L Final   01/08/2023 37 10 - 40 U/L Final   01/07/2023 42 (H) 10 - 40 U/L Final   01/04/2023 40 10 - 40 U/L Final   01/03/2023 37 10 - 40 U/L Final     ALT   Date Value Ref Range Status   01/09/2023 18 10 - 44 U/L Final   01/08/2023 18 10 - 44 U/L Final   01/07/2023 18 10 - 44 U/L Final   01/04/2023 17 10 - 44 U/L Final   01/03/2023 16 10 - 44 U/L Final        Alkaline Phosphatase   Date Value Ref Range Status   01/09/2023 121 55 - 135 U/L Final   01/08/2023 135 55 - 135 U/L Final   01/07/2023 138 (H) 55 - 135 U/L Final   01/04/2023 130 55 - 135 U/L Final   01/03/2023 130 55 - 135 U/L Final

## 2023-01-09 NOTE — PT/OT/SLP PROGRESS
Occupational Therapy   Treatment    Name: Rachel Zazueta  MRN: [id number]  Admitting Diagnosis:  Debility       Recommendations:     Discharge Recommendations: other (see comments) (TBD)  Discharge Equipment Recommendations:  other (see comments) (TBD)  Barriers to discharge:  Inaccessible home environment, Other (Comment) (medical and functional status)    Assessment:     Rachel Zazueta is a 42 y.o. female with a medical diagnosis of Debility.  She presents with good participation. Performance deficits affecting function are weakness, impaired endurance, impaired self care skills, impaired functional mobility, decreased safety awareness, pain, decreased ROM.     Rehab Prognosis:  Fair; patient would benefit from acute skilled OT services to address these deficits and reach maximum level of function.       Plan:     Patient to be seen 5 x/week to address the above listed problems via self-care/home management, therapeutic activities, therapeutic exercises  Plan of Care Expires: 01/09/23 (Informed OT and will expand POC date)  Plan of Care Reviewed with: patient    Subjective     Pain/Comfort:  Pain Rating 1: 9/10  Location - Side 1: Bilateral  Location - Orientation 1: lower  Location 1: rib(s)  Pain Addressed 1: Pre-medicate for activity, Reposition, Cessation of Activity  Pain Rating Post-Intervention 1: 9/10    Objective:     Communicated with: RN and OT prior to session.  Patient found HOB elevated with PureWick upon OT entry to room.    General Precautions: Standard, fall, contact    Orthopedic Precautions:N/A  Braces: N/A  Respiratory Status: Room air     Occupational Performance:     Bed Mobility:    Patient completed Rolling/Turning to Left with  moderate assistance  Patient completed Scooting/Bridging with moderate assistance  Patient completed Supine to Sit with moderate assistance  Patient completed Sit to Supine with moderate assistance       [facility] 6 Click ADL: 16    Treatment & Education:  Pt  agreeable to participate despite high pain levels. Pt completed bed mobility task with mod A to sitting on side of bed. Pt needed increase time due to pain and needed multiple trials to complete successfully. Pt sat EOB unsupported for ~ 90 sec. Pt needed mod A to lie back down. Pt needed max A to scooting to head of bed. Pt then completed therapeutic exercises x 30 supine in bed with HOB elevated with min rest breaks b/w sets in the following planes: shoulder flexion/extension, shoulder abduction, elbow extension/flexion, and composite fist.    Patient left HOB elevated with all lines intact and call button in reach    GOALS:   Multidisciplinary Problems       Occupational Therapy Goals          Problem: Occupational Therapy    Goal Priority Disciplines Outcome Interventions   Occupational Therapy Goal     OT, PT/OT Ongoing, Progressing    Description: Goals to be met by: 01/02/22     Patient will increase functional independence with ADLs by performing:    UE Dressing with Set-up Assistance.  LE Dressing with Minimal Assistance.  Grooming while seated with Modified [place].  Toileting from toilet with Supervision for hygiene and clothing management.   Bathing from  shower chair/bench with Minimal Assistance.  Step transfer with Minimal Assistance  Toilet transfer to toilet with Minimal Assistance.  Increased functional strength to 4/5 for bilateral UE's.                         Time Tracking:     OT Date of Treatment: 01/09/23  OT Start Time: 0954  OT Stop Time: 1028  OT Total Time (min): 34 min    Billable Minutes:Therapeutic Activity 15  Therapeutic Exercise 19    OT/ROSS: ROSS     ROSS Visit Number: 1 1/9/2023

## 2023-01-09 NOTE — ASSESSMENT & PLAN NOTE
Lower extremity weakness bilaterally now requiring aggressive OT and PT efforts to prevent further weakness and also regain strength necessary to carry out ADLs.   - From early bed bound state, patient has required multiple nursing staff in safely getting in and out of bed   - Original discharge plans with outpatient rehab will cause more harm to patient at this time and case management working to find a placement at SNF with PT and OT efforts  1/9 continue efforts with PT and OT, awaiting placement

## 2023-01-10 LAB
ALBUMIN SERPL BCP-MCNC: 1.8 G/DL (ref 3.5–5.2)
ALP SERPL-CCNC: 124 U/L (ref 55–135)
ALT SERPL W/O P-5'-P-CCNC: 19 U/L (ref 10–44)
ANION GAP SERPL CALC-SCNC: 4 MMOL/L (ref 8–16)
AST SERPL-CCNC: 39 U/L (ref 10–40)
BASOPHILS # BLD AUTO: 0.01 K/UL (ref 0–0.2)
BASOPHILS NFR BLD: 0.7 % (ref 0–1.9)
BILIRUB SERPL-MCNC: 1 MG/DL (ref 0.1–1)
BUN SERPL-MCNC: 16 MG/DL (ref 6–20)
CALCIUM SERPL-MCNC: 8 MG/DL (ref 8.7–10.5)
CHLORIDE SERPL-SCNC: 107 MMOL/L (ref 95–110)
CO2 SERPL-SCNC: 27 MMOL/L (ref 23–29)
CREAT SERPL-MCNC: 0.5 MG/DL (ref 0.5–1.4)
CRP SERPL-MCNC: 3.47 MG/DL (ref 0–0.75)
DIFFERENTIAL METHOD: ABNORMAL
EOSINOPHIL # BLD AUTO: 0 K/UL (ref 0–0.5)
EOSINOPHIL NFR BLD: 1.4 % (ref 0–8)
ERYTHROCYTE [DISTWIDTH] IN BLOOD BY AUTOMATED COUNT: 17 % (ref 11.5–14.5)
ERYTHROCYTE [SEDIMENTATION RATE] IN BLOOD: 55 MM/HR (ref 0–20)
EST. GFR  (NO RACE VARIABLE): >60 ML/MIN/1.73 M^2
GLUCOSE SERPL-MCNC: 110 MG/DL (ref 70–110)
HCT VFR BLD AUTO: 27.2 % (ref 37–48.5)
HGB BLD-MCNC: 8.7 G/DL (ref 12–16)
IMM GRANULOCYTES # BLD AUTO: 0.01 K/UL (ref 0–0.04)
IMM GRANULOCYTES NFR BLD AUTO: 0.7 % (ref 0–0.5)
LYMPHOCYTES # BLD AUTO: 0.3 K/UL (ref 1–4.8)
LYMPHOCYTES NFR BLD: 19 % (ref 18–48)
MAGNESIUM SERPL-MCNC: 1.9 MG/DL (ref 1.6–2.6)
MCH RBC QN AUTO: 27.6 PG (ref 27–31)
MCHC RBC AUTO-ENTMCNC: 32 G/DL (ref 32–36)
MCV RBC AUTO: 86 FL (ref 82–98)
MONOCYTES # BLD AUTO: 0.2 K/UL (ref 0.3–1)
MONOCYTES NFR BLD: 16.2 % (ref 4–15)
NEUTROPHILS # BLD AUTO: 0.9 K/UL (ref 1.8–7.7)
NEUTROPHILS NFR BLD: 62 % (ref 38–73)
NRBC BLD-RTO: 0 /100 WBC
PLATELET # BLD AUTO: 73 K/UL (ref 150–450)
PMV BLD AUTO: 9.2 FL (ref 9.2–12.9)
POTASSIUM SERPL-SCNC: 3.8 MMOL/L (ref 3.5–5.1)
PROT SERPL-MCNC: 7.2 G/DL (ref 6–8.4)
RBC # BLD AUTO: 3.15 M/UL (ref 4–5.4)
SODIUM SERPL-SCNC: 138 MMOL/L (ref 136–145)
WBC # BLD AUTO: 1.42 K/UL (ref 3.9–12.7)

## 2023-01-10 PROCEDURE — 25000003 PHARM REV CODE 250: Performed by: EMERGENCY MEDICINE

## 2023-01-10 PROCEDURE — 85651 RBC SED RATE NONAUTOMATED: CPT | Performed by: STUDENT IN AN ORGANIZED HEALTH CARE EDUCATION/TRAINING PROGRAM

## 2023-01-10 PROCEDURE — 36415 COLL VENOUS BLD VENIPUNCTURE: CPT | Performed by: INTERNAL MEDICINE

## 2023-01-10 PROCEDURE — 99233 PR SUBSEQUENT HOSPITAL CARE,LEVL III: ICD-10-PCS | Mod: ,,, | Performed by: STUDENT IN AN ORGANIZED HEALTH CARE EDUCATION/TRAINING PROGRAM

## 2023-01-10 PROCEDURE — 86140 C-REACTIVE PROTEIN: CPT | Performed by: STUDENT IN AN ORGANIZED HEALTH CARE EDUCATION/TRAINING PROGRAM

## 2023-01-10 PROCEDURE — 85025 COMPLETE CBC W/AUTO DIFF WBC: CPT | Mod: 91 | Performed by: STUDENT IN AN ORGANIZED HEALTH CARE EDUCATION/TRAINING PROGRAM

## 2023-01-10 PROCEDURE — 25000003 PHARM REV CODE 250: Performed by: INTERNAL MEDICINE

## 2023-01-10 PROCEDURE — 27000207 HC ISOLATION

## 2023-01-10 PROCEDURE — 36415 COLL VENOUS BLD VENIPUNCTURE: CPT | Performed by: STUDENT IN AN ORGANIZED HEALTH CARE EDUCATION/TRAINING PROGRAM

## 2023-01-10 PROCEDURE — 80053 COMPREHEN METABOLIC PANEL: CPT | Mod: 91 | Performed by: STUDENT IN AN ORGANIZED HEALTH CARE EDUCATION/TRAINING PROGRAM

## 2023-01-10 PROCEDURE — 99233 SBSQ HOSP IP/OBS HIGH 50: CPT | Mod: ,,, | Performed by: STUDENT IN AN ORGANIZED HEALTH CARE EDUCATION/TRAINING PROGRAM

## 2023-01-10 PROCEDURE — 83735 ASSAY OF MAGNESIUM: CPT | Performed by: INTERNAL MEDICINE

## 2023-01-10 PROCEDURE — 11000001 HC ACUTE MED/SURG PRIVATE ROOM

## 2023-01-10 PROCEDURE — 85025 COMPLETE CBC W/AUTO DIFF WBC: CPT | Performed by: INTERNAL MEDICINE

## 2023-01-10 PROCEDURE — 25000003 PHARM REV CODE 250: Performed by: PSYCHIATRY & NEUROLOGY

## 2023-01-10 PROCEDURE — 25000003 PHARM REV CODE 250: Performed by: STUDENT IN AN ORGANIZED HEALTH CARE EDUCATION/TRAINING PROGRAM

## 2023-01-10 PROCEDURE — 87040 BLOOD CULTURE FOR BACTERIA: CPT | Mod: 59 | Performed by: STUDENT IN AN ORGANIZED HEALTH CARE EDUCATION/TRAINING PROGRAM

## 2023-01-10 PROCEDURE — 83605 ASSAY OF LACTIC ACID: CPT | Performed by: STUDENT IN AN ORGANIZED HEALTH CARE EDUCATION/TRAINING PROGRAM

## 2023-01-10 PROCEDURE — 97530 THERAPEUTIC ACTIVITIES: CPT

## 2023-01-10 PROCEDURE — 97110 THERAPEUTIC EXERCISES: CPT

## 2023-01-10 PROCEDURE — 80053 COMPREHEN METABOLIC PANEL: CPT | Performed by: INTERNAL MEDICINE

## 2023-01-10 PROCEDURE — 97535 SELF CARE MNGMENT TRAINING: CPT

## 2023-01-10 RX ORDER — LANOLIN ALCOHOL/MO/W.PET/CERES
400 CREAM (GRAM) TOPICAL ONCE
Status: COMPLETED | OUTPATIENT
Start: 2023-01-10 | End: 2023-01-10

## 2023-01-10 RX ORDER — POTASSIUM CHLORIDE 20 MEQ/1
20 TABLET, EXTENDED RELEASE ORAL ONCE
Status: COMPLETED | OUTPATIENT
Start: 2023-01-10 | End: 2023-01-10

## 2023-01-10 RX ADMIN — GABAPENTIN 300 MG: 300 CAPSULE ORAL at 08:01

## 2023-01-10 RX ADMIN — METHOCARBAMOL TABLETS 500 MG: 500 TABLET, COATED ORAL at 06:01

## 2023-01-10 RX ADMIN — HYDROCODONE BITARTRATE AND ACETAMINOPHEN 1 TABLET: 5; 325 TABLET ORAL at 10:01

## 2023-01-10 RX ADMIN — POTASSIUM CHLORIDE 20 MEQ: 1500 TABLET, EXTENDED RELEASE ORAL at 09:01

## 2023-01-10 RX ADMIN — HYDROCODONE BITARTRATE AND ACETAMINOPHEN 1 TABLET: 5; 325 TABLET ORAL at 02:01

## 2023-01-10 RX ADMIN — BUPROPION HYDROCHLORIDE 100 MG: 100 TABLET, FILM COATED ORAL at 08:01

## 2023-01-10 RX ADMIN — GABAPENTIN 300 MG: 300 CAPSULE ORAL at 09:01

## 2023-01-10 RX ADMIN — METHOCARBAMOL TABLETS 500 MG: 500 TABLET, COATED ORAL at 08:01

## 2023-01-10 RX ADMIN — ALPRAZOLAM 0.25 MG: 0.25 TABLET ORAL at 02:01

## 2023-01-10 RX ADMIN — BUPROPION HYDROCHLORIDE 100 MG: 100 TABLET, FILM COATED ORAL at 09:01

## 2023-01-10 RX ADMIN — METHOCARBAMOL TABLETS 500 MG: 500 TABLET, COATED ORAL at 09:01

## 2023-01-10 RX ADMIN — PANTOPRAZOLE SODIUM 40 MG: 40 TABLET, DELAYED RELEASE ORAL at 09:01

## 2023-01-10 RX ADMIN — HYDROCODONE BITARTRATE AND ACETAMINOPHEN 1 TABLET: 5; 325 TABLET ORAL at 06:01

## 2023-01-10 RX ADMIN — METHOCARBAMOL TABLETS 500 MG: 500 TABLET, COATED ORAL at 03:01

## 2023-01-10 RX ADMIN — ALPRAZOLAM 0.25 MG: 0.25 TABLET ORAL at 08:01

## 2023-01-10 RX ADMIN — IBUPROFEN 400 MG: 400 TABLET, FILM COATED ORAL at 09:01

## 2023-01-10 RX ADMIN — ALPRAZOLAM 0.25 MG: 0.25 TABLET ORAL at 10:01

## 2023-01-10 RX ADMIN — MAGNESIUM OXIDE TAB 400 MG (241.3 MG ELEMENTAL MG) 400 MG: 400 (241.3 MG) TAB at 09:01

## 2023-01-10 RX ADMIN — GABAPENTIN 300 MG: 300 CAPSULE ORAL at 03:01

## 2023-01-10 NOTE — PROGRESS NOTES
"[facility] Medicine  Progress Note    Patient Name: Rachel Zazueta  MRN: [id number]  Patient Class: IP- Inpatient   Admission Date: 12/23/2022  Length of Stay: 18 days  Attending Physician: Dannielle Merino DO  Primary Care Provider: Dannielle Merino DO    Subjective:     Principal Problem:Debility        HPI:  ER HPI:  42-year-old female with a history of anemia, anxiety, recurrent cellulitis, hepatitis C, IVDU, liver cirrhosis, cholecystectomy, kidney stones, lumbar fusion, shoulder surgery, chronic back pain comes in c/o generalized weakness, fatigue, and bilateral flank pain.  She reports that this has been going on intermittently for several days and was diagnosed with a UTI recently.  No fever.  No abdominal pain or vomiting or diarrhea.     IM HPI:  Patient is well known to our service.  Patient has a history of IV drug abuse and unfortunately has relapsed.  Patient has a history of a epidural abscess requiring a large lumbar surgery with multiple rehabilitative stays and therapy.  The patient was progressing to ambulation and recently has declined.  Patient admits to starting drugs including IV drug use again.  Patient presented to the ED with the above symptoms and she has a noticeable cardiac murmur today that we are getting a stat echocardiogram.  Patient's been started on antibiotics fluid resuscitated and seems to have sepsis.  Patient's urinary studies were abnormal but not overwhelming.      Overview/Hospital Course:  12/24/22 CG: Patient remains in the ICU today. Has a lot of muscle spasms and low back pain. Echocardiogram ordered yesterday and completed; significant for pulm HTN but without vegetations.   12/25/22 CG:  Overnight she was having a lot of significant discomfort and pain.  We placed her on Precedex and this has helped significantly with her body aches, her irritability and muscle spasms."  12/26/22 FM:  Patient required Precedex for agitation and mood " changes.  Patient is calm and cooperative this morning.  Patient appears to have E coli sepsis so will taper antibiotics.  Patient's echocardiogram showed no vegetation and E coli would be low risk for metastatic infection.  Will transfer the patient to the floor once her Precedex discontinue we counseled her again today on the importance of avoiding substances and illicit drugs.  This continues to be setback for her.  12/27/22 WC:  Patient overall remains stable and is slowly improving.  Urine culture has revealed Candida albicans in addition to blood culture revealing E coli.  12/28/22 WC:  Patient resting comfortably this morning.  Pharm ID on case for treatment duration recs.    12/29/22 WC:  no acute events overnight.  Back pain at this time.   12/20/22:  no acute events continue IV ABX for esbl  12/31/22 CG: stable, continuing antibiotics.   1/1/2 CG: stable, continuing antibiotics. Has not had a bowel movement in two weeks. Will give lactulose.    01/02/2023: Patient did have a bowel movement yesterday.  Completing last day of antimicrobial therapy anticipated discharge tomorrow.  1/3/2023: Improving BM with lactulose. Lower extremity weakness requiring aggressive PT and OT efforts. Patient understands she needs to be an active participant. Encouraged inbed physical exercises without assistance with therapists. Stable Hg/HCT.   1/4/2023: Lactulose every other day for regular bowel movement. PT and OT recommendations with continue efforts to help with lower extremity weakness which likely precipitated from minimal participation from patient over the past week to remain physically active. Hg/HCT remains stable. Last blood Cx x2 (12/28) NGTD x5 days. Patient agrees moving forward lower extremity strengthening will have to take place with her participation and motivation. Will discharge with continue PT and OT efforts outpatient. She acknowledges outpatient follow up with GI/hepatologist, hematologist and  neurologist required to address all other chronic medication conditions.   1/5/2023: Overnight report from nursing that patient exhibited weakness in lower extremities and in upper body strength to support self to safely transfer from bed to commode or even bed to wheelchair. CM involved in placement to SNF. Continue with PT and OT. All other medical condition addressed during hospitalization remains stable.  1/6/2023: CM working on SNF placement. Patient expresses continue effort on her part to regain her strength, reassured her that she has been able to participate following the leads of PT and OT and so a new injury is less likely at this time. While small, she endorsed sitting up at bedside with legs dangle.    1/7/23 ND patient feeling well this morning no complaints will continue therapy services and rehab placement  1/8/23 ND pt reports some back pain will repeat x-rays to make sure everything looks okay from previous surgery.  Otherwise she is doing well  1/9/23 Repeat xray showing increased kyphosis over TL junction. Today, pain associated with massaging feet bilaterally with no worsening of numbness and tingling. No reported concerns with PT and OT efforts. Awaiting placement.  1/10/23 Overnight elevated temperature >101F, no further discomfort at this time and on morning exam denies further symptoms and worsening pain over joints and spine. PT and OT efforts moving forward, but still has significant muscle weakness proximally and over her core. White cells reduced, CRP elevated. Will obtain MRI scan brain and spine to follow up with worsening scoliosis, identify occult findings.        Interval History: Patient seen and examined.     Review of Systems   Constitutional:  Negative for activity change, appetite change, chills, diaphoresis, fatigue and fever.   HENT:  Negative for sore throat.    Eyes:  Negative for pain.   Respiratory:  Negative for cough, choking, chest tightness and shortness of breath.     Cardiovascular:  Negative for chest pain, palpitations and leg swelling.   Gastrointestinal:  Negative for abdominal pain, constipation, diarrhea, nausea, rectal pain and vomiting.   Genitourinary:  Negative for dyspareunia, dysuria, flank pain, frequency, genital sores, hematuria and menstrual problem.   Musculoskeletal:  Positive for arthralgias, back pain, gait problem, myalgias and neck pain.   Skin:  Negative for pallor, rash and wound.   Neurological:  Positive for weakness. Negative for dizziness, tremors, speech difficulty and headaches.   Psychiatric/Behavioral:  Negative for agitation, behavioral problems, confusion, decreased concentration and dysphoric mood.    Objective:     Vital Signs (Most Recent):  Temp: 97.5 °F (36.4 °C) (01/10/23 0722)  Pulse: 90 (01/10/23 0722)  Resp: 18 (01/10/23 1046)  BP: 116/61 (01/10/23 0722)  SpO2: 97 % (01/10/23 0722) Vital Signs (24h Range):  Temp:  [97.5 °F (36.4 °C)-101.3 °F (38.5 °C)] 97.5 °F (36.4 °C)  Pulse:  [] 90  Resp:  [16-20] 18  SpO2:  [95 %-100 %] 97 %  BP: ()/(55-68) 116/61     Weight: 97.5 kg (214 lb 15.2 oz)  Body mass index is 33.67 kg/m².    Intake/Output Summary (Last 24 hours) at 1/10/2023 1056  Last data filed at 1/10/2023 0547  Gross per 24 hour   Intake 1800 ml   Output 2800 ml   Net -1000 ml      Physical Exam  Vitals and nursing note reviewed.   Constitutional:       General: She is not in acute distress.     Appearance: She is obese.   HENT:      Head: Normocephalic and atraumatic.      Right Ear: External ear normal.      Left Ear: External ear normal.      Nose: Nose normal. No congestion or rhinorrhea.      Mouth/Throat:      Mouth: Mucous membranes are moist.      Pharynx: No oropharyngeal exudate.   Eyes:      General: No scleral icterus.     Extraocular Movements: Extraocular movements intact.      Conjunctiva/sclera: Conjunctivae normal.   Neck:      Vascular: No carotid bruit.      Comments: Limited ROM at  baseline  Cardiovascular:      Rate and Rhythm: Normal rate and regular rhythm.      Heart sounds: Murmur heard.     No friction rub. No gallop.   Pulmonary:      Effort: No respiratory distress.      Breath sounds: No stridor. No wheezing, rhonchi or rales.   Chest:      Chest wall: No tenderness.   Abdominal:      General: There is no distension.      Palpations: Abdomen is soft. There is no mass.      Tenderness: There is no abdominal tenderness. There is no right CVA tenderness, left CVA tenderness or guarding.   Musculoskeletal:         General: No swelling, tenderness or deformity.      Cervical back: Normal range of motion and neck supple. No rigidity or tenderness.      Right lower leg: No edema.      Left lower leg: No edema.   Lymphadenopathy:      Cervical: No cervical adenopathy.   Skin:     General: Skin is dry.      Capillary Refill: Capillary refill takes less than 2 seconds.      Coloration: Skin is not jaundiced or pale.      Findings: No bruising.   Neurological:      Mental Status: She is oriented to person, place, and time. Mental status is at baseline.      Cranial Nerves: No cranial nerve deficit.      Sensory: Sensory deficit present.      Motor: Weakness (unable to lift legs against gravity, also unable to plant feet with hips and knees flexed bilaterally, no SI joint tenderness, tenderness over spinous processes at baseline) present.      Comments: Dorsiflexion and plantarflexion at ankles, intact and equal bilaterally, minimally tender to motion   Psychiatric:         Mood and Affect: Mood normal.         Behavior: Behavior normal.      Comments: Calm, cooperative, moving all extremities.       Significant Labs: All pertinent labs within the past 24 hours have been reviewed.  Blood Culture: No results for input(s): LABBLOO in the last 48 hours.  BMP:   Recent Labs   Lab 01/10/23  0546         K 3.8      CO2 27   BUN 16   CREATININE 0.5   CALCIUM 8.0*   MG 1.9     CBC:    Recent Labs   Lab 01/09/23  0538 01/10/23  0546   WBC 1.76* 1.42*   HGB 8.9* 8.7*   HCT 27.5* 27.2*   PLT 74* 73*     CMP:   Recent Labs   Lab 01/09/23  0538 01/10/23  0546    138   K 4.1 3.8    107   CO2 28 27   GLU 94 110   BUN 13 16   CREATININE 0.4* 0.5   CALCIUM 8.0* 8.0*   PROT 7.0 7.2   ALBUMIN 1.8* 1.8*   BILITOT 1.0 1.0   ALKPHOS 121 124   AST 38 39   ALT 18 19   ANIONGAP 4* 4*     Magnesium:   Recent Labs   Lab 01/09/23 0538 01/10/23  0546   MG 1.8 1.9     Significant Imaging: I have reviewed all pertinent imaging results/findings within the past 24 hours.      Assessment/Plan:      * Debility  Lower extremity weakness bilaterally now requiring aggressive OT and PT efforts to prevent further weakness and also regain strength necessary to carry out ADLs.   - From early bed bound state, patient has required multiple nursing staff in safely getting in and out of bed   - Original discharge plans with outpatient rehab will cause more harm to patient at this time and case management working to find a placement at SNF with PT and OT efforts  1/9 continue efforts with PT and OT, awaiting placement  1/10 slow progress with PT and OT, will obtain MRI brain and spine to identify any concerning changes      Pulmonary hypertension  Found incidentally on echocardiogram with evidence of elevated right sided pressures and dilated heart chambers on the right side. Unclear the exact etiology or class of Pulm HTN but given her significant substance abuse Class 1 certainly is a possibility.   - Will refer to outpatient pulmonologist  - Will consider a cariology consult while inpatient to see if they think it would be beneficial to have a heart cath done while inpatient      Severe sepsis  This patient does have evidence of infective focus  My overall impression is sepsis. Vital signs were reviewed and noted in progress note.  Antibiotics given-   Antibiotics (From admission, onward)    Start     Stop Route  Frequency Ordered    01/02/23 1515  meropenem (MERREM) 1 g in sodium chloride 0.9 % 100 mL IVPB (MB+)        See Hyperspace for full Linked Orders Report.    01/03 0714 IV Every 8 hours (non-standard times) 01/02/23 0923    12/23/22 0533  vancomycin (VANCOCIN) 1,000 mg injection        Note to Pharmacy: Created by cabinet override    12/23 1744   12/23/22 0533    12/23/22 0405  piperacillin-tazobactam (ZOSYN) 4.5 gram injection        Note to Pharmacy: Created by cabinet override    12/23 1614   12/23/22 0405        Cultures were taken-   Microbiology Results (last 7 days)     Procedure Component Value Units Date/Time    Blood culture [[id number]] Collected: 01/10/23 0621    Order Status: Sent Specimen: Blood Updated: 01/10/23 0621    Blood culture [[id number]] Collected: 01/10/23 0616    Order Status: Sent Specimen: Blood Updated: 01/10/23 0616        Latest lactate reviewed, they are-  No results for input(s): LACTATE in the last 72 hours.    Organ dysfunction indicated by Acute kidney injury, Encephalopathy  and Acute liver injury  Source- ?    Source control Achieved by- see orders  - Merrem day 7 of 7 (completed on 1/3/23)  - Blood cx x2 (1/2/23) final report no growth    1/7 resolved  1/10 overnight elevated temperature reading, no worsening overall pain. Follow up Blood cultures and imagining.       Murmur, cardiac  No evidence of vegetations.     Mild episode of recurrent major depressive disorder  Resume home meds.   - lyrica discontinued  - continue gabapentin 300 mg TID      Cannabis use disorder, moderate, dependence  As above.      Amphetamine use disorder, severe  Continue to  educate and support      Spinal stenosis at L4-L5 level  Check xray of back due to pain, increase in scoliosis.   1/10 given proximal muscle weakness with slow improvement with daily PT and OT, will follow up MRI brain and spine    Substance use disorder  Cessation advised. Follow up consult psychiatry.     Chronic hepatitis  C with cirrhosis  Labs noted, will require follow up outpatient with GI to start treatment.       Cirrhosis of liver without ascites  Labs noted, stable.  AST   Date Value Ref Range Status   01/10/2023 39 10 - 40 U/L Final   01/09/2023 38 10 - 40 U/L Final   01/08/2023 37 10 - 40 U/L Final   01/07/2023 42 (H) 10 - 40 U/L Final   01/04/2023 40 10 - 40 U/L Final     ALT   Date Value Ref Range Status   01/10/2023 19 10 - 44 U/L Final   01/09/2023 18 10 - 44 U/L Final   01/08/2023 18 10 - 44 U/L Final   01/07/2023 18 10 - 44 U/L Final   01/04/2023 17 10 - 44 U/L Final        Alkaline Phosphatase   Date Value Ref Range Status   01/10/2023 124 55 - 135 U/L Final   01/09/2023 121 55 - 135 U/L Final   01/08/2023 135 55 - 135 U/L Final   01/07/2023 138 (H) 55 - 135 U/L Final   01/04/2023 130 55 - 135 U/L Final            Pancytopenia  Counts are stable at this time.  Two days of leukocyte count dropping 2.39 to 1.76 and 1.42.   Overnight febrile event >101F, patient however feels better compared to yesterday evening.   Vital signs otherwise, stable, no tachycardia, BP normotensive.   - f/u blood cultures  - will hold use of empiric antibiotics  - f/u MRI brain and spine        VTE Risk Mitigation (From admission, onward)         Ordered     IP VTE HIGH RISK PATIENT  Once         12/23/22 0647     Place sequential compression device  Until discontinued         12/23/22 0647                Discharge Planning   SHIRA: 1/4/2023     Code Status: Full Code   Is the patient medically ready for discharge?:     Reason for patient still in hospital (select all that apply): Patient trending condition, Imaging, PT / OT recommendations and Pending disposition  Discharge Plan A: Skilled Nursing Facility   Discharge Delays: None known at this time              Dannielle Merino DO  Department of Hospital Medicine   [facility]

## 2023-01-10 NOTE — ASSESSMENT & PLAN NOTE
Labs noted, stable.  AST   Date Value Ref Range Status   01/10/2023 39 10 - 40 U/L Final   01/09/2023 38 10 - 40 U/L Final   01/08/2023 37 10 - 40 U/L Final   01/07/2023 42 (H) 10 - 40 U/L Final   01/04/2023 40 10 - 40 U/L Final     ALT   Date Value Ref Range Status   01/10/2023 19 10 - 44 U/L Final   01/09/2023 18 10 - 44 U/L Final   01/08/2023 18 10 - 44 U/L Final   01/07/2023 18 10 - 44 U/L Final   01/04/2023 17 10 - 44 U/L Final        Alkaline Phosphatase   Date Value Ref Range Status   01/10/2023 124 55 - 135 U/L Final   01/09/2023 121 55 - 135 U/L Final   01/08/2023 135 55 - 135 U/L Final   01/07/2023 138 (H) 55 - 135 U/L Final   01/04/2023 130 55 - 135 U/L Final

## 2023-01-10 NOTE — PLAN OF CARE
Problem: Physical Therapy  Goal: Physical Therapy Goal  Description: Goals to be met by: 2023     Patient will increase functional independence with mobility by performin. Supine to sit with Modified [place]  2. Sit to supine with Modified [place]  3. Sit to stand transfer with Stand-by Assistance  4. Bed to chair transfer with Stand-by Assistance using Rolling Walker  5. Gait  x 50 feet with Contact Guard Assistance using Rolling Walker.   6. Sitting at edge of bed x10 minutes with [place] to perform (B) LE therex      Outcome: Ongoing, Progressing

## 2023-01-10 NOTE — PLAN OF CARE
Recommendations  1. Rec'd continue current diet order: Heart Healthy.   2. Rec'd ONS: Ensure Enlive BID to provide 700kcal and 40g protein.   3. Rec'd encourage PO intake and compliance with drinking ONS.   4. RD to follow and make rec's accordingly.  Goals:   1. Pt will consume > 75% EEN via PO intake by next RD follow up.  Nutrition Goal Status: new

## 2023-01-10 NOTE — PROGRESS NOTES
[facility]  [facility]  Progress Note    SUMMARY       Recommendations  1. Rec'd continue current diet order: Heart Healthy.   2. Rec'd ONS: Ensure Enlive BID to provide 700kcal and 40g protein.   3. Rec'd encourage PO intake and compliance with drinking ONS.   4. RD to follow and make rec's accordingly.  Goals:   1. Pt will consume > 75% EEN via PO intake by next RD follow up.  Nutrition Goal Status: new  Communication of RD Recs: other (comment) (Documented in POC)    Assessment and Plan  Nutrition Problem  No nutrition diagnosis at this time.    Reason for Assessment    Reason For Assessment: length of stay  Diagnosis: other (see comments) (Debility)  Relevant Medical History: Anemia, Anxiety, Depressed, Diskitis, Chronic Hep C w/o coma,  IV drug abuse, Kidney stone, Liver cirrhosis  Interdisciplinary Rounds: did not attend  General Information Comments: RD triggered for Length of Stay. Pt is eating 50-75% EEN via PO intake. Rec'd continue to encourage PO intake. Noted pt's food allergies and communicated with kitchen staff. RD to follow and make rec's accordingly.  Nutrition Discharge Planning: TBD as care progresses    Nutrition Risk Screen    Nutrition Risk Screen: no indicators present    Nutrition/Diet History    Spiritual, Cultural Beliefs, Mosque Practices, Values that Affect Care: no  Food Allergies: shellfish, tree nut (iodine and iodide containing products)  Factors Affecting Nutritional Intake: decreased appetite    Anthropometrics    Temp: 97.5 °F (36.4 °C)  Height: 5' 7" (170.2 cm)  Height (inches): 67 in  Weight Method: Bed Scale  Weight: 97.5 kg (214 lb 15.2 oz)  Weight (lb): 214.95 lb  Ideal Body Weight (IBW), Female: 135 lb  % Ideal Body Weight, Female (lb): 159.22 %  BMI (Calculated): 33.7  BMI Grade: 30 - 34.9- obesity - grade I    Lab/Procedures/Meds    Pertinent Labs Reviewed: reviewed  Pertinent Medications Reviewed: reviewed    Estimated/Assessed Needs    Weight Used For " Calorie Calculations: 70.2 kg (154 lb 11.9 oz) (AdjBW)  Energy Calorie Requirements (kcal): 5718-1137 (20-25kcal/kg AdjBW)  Energy Need Method: Kcal/kg  Protein Requirements: 49-61 (0.8-1.0g/kg IBW)  Weight Used For Protein Calculations: 61.2 kg (134 lb 15.8 oz) (IBW)  Fluid Requirements (mL): 9354-2594 (1mL/kcal)  Estimated Fluid Requirement Method: RDA Method  RDA Method (mL): 1404    Nutrition Prescription Ordered    Current Diet Order: Heart Healthy  Oral Nutrition Supplement: Ensure Enlive (TID)    Evaluation of Received Nutrient/Fluid Intake    % Kcal Needs: 50-75%  % Protein Needs: 50-75%  I/O: -960  Energy Calories Required: not meeting needs  Protein Required: not meeting needs  Tolerance: tolerating  % Intake of Estimated Energy Needs: 50 - 75 %  % Meal Intake: 50 - 75 %    Nutrition Risk    Level of Risk/Frequency of Follow-up: moderate     Monitor and Evaluation    Food and Nutrient Intake: energy intake, food and beverage intake  Food and Nutrient Adminstration: diet order  Knowledge/Beliefs/Attitudes: food and nutrition knowledge/skill, beliefs and attitudes  Physical Activity and Function: nutrition-related ADLs and IADLs  Anthropometric Measurements: height/length, weight, weight change, body mass index  Biochemical Data, Medical Tests and Procedures: electrolyte and renal panel, gastrointestinal profile, glucose/endocrine profile, inflammatory profile, lipid profile  Nutrition-Focused Physical Findings: overall appearance     Nutrition Follow-Up    RD Follow-up?: Yes  01/13/2023

## 2023-01-10 NOTE — PT/OT/SLP PROGRESS
Physical Therapy Treatment    Patient Name:  Rachel Zazueta   MRN:  [id number]    Recommendations:     Discharge Recommendations:  (TBD)  Discharge Equipment Recommendations:  (TBD)  Barriers to discharge:  None    Assessment:     Rachel Zazueta is a 42 y.o. female admitted with a medical diagnosis of Debility.  She presents with the following impairments/functional limitations: weakness, impaired functional mobility, impaired endurance, pain, impaired self care skills, decreased lower extremity function. Patient is able to perform B LE there-ex with Max A from therapist. She is unable to perform bed mobility or transfers this visit secondary to reported pain and weakness. She is reporting significant low back and flank pain leading to her deficits with mobility along with her weakness. Patient reports she is trying to perform the treatment, but her pain and weakness do not allow at times.     Rehab Prognosis: Fair; patient would benefit from acute skilled PT services to address these deficits and reach maximum level of function.    Recent Surgery: * No surgery found *      Plan:     During this hospitalization, patient to be seen 5 x/week to address the identified rehab impairments via gait training, therapeutic activities, therapeutic exercises and progress toward the following goals:    Plan of Care Expires:  01/16/23    Subjective     Chief Complaint: pain in low back and weakness  Patient/Family Comments/goals: return to PLOF  Pain/Comfort:  Pain Rating 1: 10/10  Location - Side 1: Bilateral  Location - Orientation 1: lower  Location 1: back  Pain Addressed 1: Reposition, Cessation of Activity  Pain Rating Post-Intervention 1: 6/10      Objective:     Communicated with patient prior to session.  Patient found HOB elevated with PureWick upon PT entry to room.     General Precautions: Standard, droplet, fall  Orthopedic Precautions: N/A  Braces: N/A  Respiratory Status: Room air     Functional Mobility:  Bed  Mobility:     Rolling Right: maximal assistance  Supine to Sit: Unable to perform this visit with multiple attempts       AM-PAC 6 CLICK MOBILITY  Turning over in bed (including adjusting bedclothes, sheets and blankets)?: 2  Sitting down on and standing up from a chair with arms (e.g., wheelchair, bedside commode, etc.): 1  Moving from lying on back to sitting on the side of the bed?: 1  Moving to and from a bed to a chair (including a wheelchair)?: 1  Need to walk in hospital room?: 1  Climbing 3-5 steps with a railing?: 1  Basic Mobility Total Score: 7       Treatment & Education:  Patient was treated with HOB elevated in a supine position with B LE there-ex secondary to inability to perform bed mobility and transfers. She is able to perform B SLR flexion 1 set of 10 reps with Max A. Contraction is noted in hip flexors and quadriceps, but she is unable to perform AROM independently. Multiple attempts to perform bed mobility and sit to stand transfers were unsuccessful this visit. Patient continues to be educated on importance of treatment to return to PLOF.     Patient left HOB elevated with call button in reach..    GOALS:   Multidisciplinary Problems       Physical Therapy Goals          Problem: Physical Therapy    Goal Priority Disciplines Outcome Goal Variances Interventions   Physical Therapy Goal     PT, PT/OT Ongoing, Progressing     Description: Goals to be met by: 2023     Patient will increase functional independence with mobility by performin. Supine to sit with Modified Marble  2. Sit to supine with Modified Mar[place]  3. Sit to stand transfer with Stand-by Assistance  4. Bed to chair transfer with Stand-by Assistance using Rolling Walker  5. Gait  x 50 feet with Contact Guard Assistance using Rolling Walker.   6. Sitting at edge of bed x10 minutes with Marble to perform (B) LE therex                           Time Tracking:     PT Received On: 01/10/23  PT Start Time: 9822      PT Stop Time: 1600  PT Total Time (min): 15 min     Billable Minutes: Therapeutic Exercise 15 minutes    Treatment Type: Treatment  PT/PTA: PT           01/10/2023

## 2023-01-10 NOTE — PLAN OF CARE
Mari with [facility] is considering this patient for rehab. She plans to visit the patient to assess her for possible placement for their rehabilitation program.     Late note: Spoke to Mady from [facility] (now called [facility] (formerly Phone: [phone] and she informed me that she was able to get the information that she needs in order to move forward with the prior auth. Will continue to monitor.     [facility] in [place] is willing to accept this patient. They plan to submit for auth today.

## 2023-01-10 NOTE — PLAN OF CARE
POC reviewed with pt      Problem: Infection  Goal: Absence of Infection Signs and Symptoms  Outcome: Ongoing, Progressing     Problem: Adult Inpatient Plan of Care  Goal: Plan of Care Review  Outcome: Ongoing, Progressing  Goal: Patient-Specific Goal (Individualized)  Outcome: Ongoing, Progressing  Goal: Absence of Hospital-Acquired Illness or Injury  Outcome: Ongoing, Progressing  Goal: Optimal Comfort and Wellbeing  Outcome: Ongoing, Progressing  Goal: Readiness for Transition of Care  Outcome: Ongoing, Progressing     Problem: Adjustment to Illness (Sepsis/Septic Shock)  Goal: Optimal Coping  Outcome: Ongoing, Progressing     Problem: Bleeding (Sepsis/Septic Shock)  Goal: Absence of Bleeding  Outcome: Ongoing, Progressing     Problem: Glycemic Control Impaired (Sepsis/Septic Shock)  Goal: Blood Glucose Level Within Desired Range  Outcome: Ongoing, Progressing     Problem: Infection Progression (Sepsis/Septic Shock)  Goal: Absence of Infection Signs and Symptoms  Outcome: Ongoing, Progressing     Problem: Nutrition Impaired (Sepsis/Septic Shock)  Goal: Optimal Nutrition Intake  Outcome: Ongoing, Progressing     Problem: Skin Injury Risk Increased  Goal: Skin Health and Integrity  Outcome: Ongoing, Progressing     Problem: Fall Injury Risk  Goal: Absence of Fall and Fall-Related Injury  Outcome: Ongoing, Progressing

## 2023-01-10 NOTE — ASSESSMENT & PLAN NOTE
Check xray of back due to pain, increase in scoliosis.   1/10 given proximal muscle weakness with slow improvement with daily PT and OT, will follow up MRI brain and spine

## 2023-01-10 NOTE — PT/OT/SLP PROGRESS
Occupational Therapy      Patient Name:  Rachel Zazueta   MRN:  [id number]    Patient not seen today secondary to pt being taken to MRI and not in room . Will follow-up today if available or 1/11/23.    1/10/2023

## 2023-01-10 NOTE — SUBJECTIVE & OBJECTIVE
Interval History: Patient seen and examined.     Review of Systems   Constitutional:  Negative for activity change, appetite change, chills, diaphoresis, fatigue and fever.   HENT:  Negative for sore throat.    Eyes:  Negative for pain.   Respiratory:  Negative for cough, choking, chest tightness and shortness of breath.    Cardiovascular:  Negative for chest pain, palpitations and leg swelling.   Gastrointestinal:  Negative for abdominal pain, constipation, diarrhea, nausea, rectal pain and vomiting.   Genitourinary:  Negative for dyspareunia, dysuria, flank pain, frequency, genital sores, hematuria and menstrual problem.   Musculoskeletal:  Positive for arthralgias, back pain, gait problem, myalgias and neck pain.   Skin:  Negative for pallor, rash and wound.   Neurological:  Positive for weakness. Negative for dizziness, tremors, speech difficulty and headaches.   Psychiatric/Behavioral:  Negative for agitation, behavioral problems, confusion, decreased concentration and dysphoric mood.    Objective:     Vital Signs (Most Recent):  Temp: 97.5 °F (36.4 °C) (01/10/23 0722)  Pulse: 90 (01/10/23 0722)  Resp: 18 (01/10/23 1046)  BP: 116/61 (01/10/23 0722)  SpO2: 97 % (01/10/23 0722) Vital Signs (24h Range):  Temp:  [97.5 °F (36.4 °C)-101.3 °F (38.5 °C)] 97.5 °F (36.4 °C)  Pulse:  [] 90  Resp:  [16-20] 18  SpO2:  [95 %-100 %] 97 %  BP: ()/(55-68) 116/61     Weight: 97.5 kg (214 lb 15.2 oz)  Body mass index is 33.67 kg/m².    Intake/Output Summary (Last 24 hours) at 1/10/2023 1056  Last data filed at 1/10/2023 0547  Gross per 24 hour   Intake 1800 ml   Output 2800 ml   Net -1000 ml      Physical Exam  Vitals and nursing note reviewed.   Constitutional:       General: She is not in acute distress.     Appearance: She is obese.   HENT:      Head: Normocephalic and atraumatic.      Right Ear: External ear normal.      Left Ear: External ear normal.      Nose: Nose normal. No congestion or rhinorrhea.       Mouth/Throat:      Mouth: Mucous membranes are moist.      Pharynx: No oropharyngeal exudate.   Eyes:      General: No scleral icterus.     Extraocular Movements: Extraocular movements intact.      Conjunctiva/sclera: Conjunctivae normal.   Neck:      Vascular: No carotid bruit.      Comments: Limited ROM at baseline  Cardiovascular:      Rate and Rhythm: Normal rate and regular rhythm.      Heart sounds: Murmur heard.     No friction rub. No gallop.   Pulmonary:      Effort: No respiratory distress.      Breath sounds: No stridor. No wheezing, rhonchi or rales.   Chest:      Chest wall: No tenderness.   Abdominal:      General: There is no distension.      Palpations: Abdomen is soft. There is no mass.      Tenderness: There is no abdominal tenderness. There is no right CVA tenderness, left CVA tenderness or guarding.   Musculoskeletal:         General: No swelling, tenderness or deformity.      Cervical back: Normal range of motion and neck supple. No rigidity or tenderness.      Right lower leg: No edema.      Left lower leg: No edema.   Lymphadenopathy:      Cervical: No cervical adenopathy.   Skin:     General: Skin is dry.      Capillary Refill: Capillary refill takes less than 2 seconds.      Coloration: Skin is not jaundiced or pale.      Findings: No bruising.   Neurological:      Mental Status: She is oriented to person, place, and time. Mental status is at baseline.      Cranial Nerves: No cranial nerve deficit.      Sensory: Sensory deficit present.      Motor: Weakness (unable to lift legs against gravity, also unable to plant feet with hips and knees flexed bilaterally, no SI joint tenderness, tenderness over spinous processes at baseline) present.      Comments: Dorsiflexion and plantarflexion at ankles, intact and equal bilaterally, minimally tender to motion   Psychiatric:         Mood and Affect: Mood normal.         Behavior: Behavior normal.      Comments: Calm, cooperative, moving all  extremities.       Significant Labs: All pertinent labs within the past 24 hours have been reviewed.  Blood Culture: No results for input(s): LABBLOO in the last 48 hours.  BMP:   Recent Labs   Lab 01/10/23  0546         K 3.8      CO2 27   BUN 16   CREATININE 0.5   CALCIUM 8.0*   MG 1.9     CBC:   Recent Labs   Lab 01/09/23  0538 01/10/23  0546   WBC 1.76* 1.42*   HGB 8.9* 8.7*   HCT 27.5* 27.2*   PLT 74* 73*     CMP:   Recent Labs   Lab 01/09/23  0538 01/10/23  0546    138   K 4.1 3.8    107   CO2 28 27   GLU 94 110   BUN 13 16   CREATININE 0.4* 0.5   CALCIUM 8.0* 8.0*   PROT 7.0 7.2   ALBUMIN 1.8* 1.8*   BILITOT 1.0 1.0   ALKPHOS 121 124   AST 38 39   ALT 18 19   ANIONGAP 4* 4*     Magnesium:   Recent Labs   Lab 01/09/23  0538 01/10/23  0546   MG 1.8 1.9     Significant Imaging: I have reviewed all pertinent imaging results/findings within the past 24 hours.

## 2023-01-10 NOTE — ASSESSMENT & PLAN NOTE
Counts are stable at this time.  Two days of leukocyte count dropping 2.39 to 1.76 and 1.42.   Overnight febrile event >101F, patient however feels better compared to yesterday evening.   Vital signs otherwise, stable, no tachycardia, BP normotensive.   - f/u blood cultures  - will hold use of empiric antibiotics  - f/u MRI brain and spine

## 2023-01-10 NOTE — PLAN OF CARE
Plan of care reviewed with the patient. Patient in a moderate amount of pain during shift with full relief from pain medication.

## 2023-01-10 NOTE — ASSESSMENT & PLAN NOTE
Lower extremity weakness bilaterally now requiring aggressive OT and PT efforts to prevent further weakness and also regain strength necessary to carry out ADLs.   - From early bed bound state, patient has required multiple nursing staff in safely getting in and out of bed   - Original discharge plans with outpatient rehab will cause more harm to patient at this time and case management working to find a placement at SNF with PT and OT efforts  1/9 continue efforts with PT and OT, awaiting placement  1/10 slow progress with PT and OT, will obtain MRI brain and spine to identify any concerning changes

## 2023-01-10 NOTE — ASSESSMENT & PLAN NOTE
This patient does have evidence of infective focus  My overall impression is sepsis. Vital signs were reviewed and noted in progress note.  Antibiotics given-   Antibiotics (From admission, onward)    Start     Stop Route Frequency Ordered    01/02/23 1515  meropenem (MERREM) 1 g in sodium chloride 0.9 % 100 mL IVPB (MB+)        See Hyperspace for full Linked Orders Report.    01/03 0714 IV Every 8 hours (non-standard times) 01/02/23 0923    12/23/22 0533  vancomycin (VANCOCIN) 1,000 mg injection        Note to Pharmacy: Created by cabinet override    12/23 1744   12/23/22 0533    12/23/22 0405  piperacillin-tazobactam (ZOSYN) 4.5 gram injection        Note to Pharmacy: Created by cabinet override    12/23 1614   12/23/22 0405        Cultures were taken-   Microbiology Results (last 7 days)     Procedure Component Value Units Date/Time    Blood culture [[id number]] Collected: 01/10/23 0621    Order Status: Sent Specimen: Blood Updated: 01/10/23 0621    Blood culture [[id number]] Collected: 01/10/23 0616    Order Status: Sent Specimen: Blood Updated: 01/10/23 0616        Latest lactate reviewed, they are-  No results for input(s): LACTATE in the last 72 hours.    Organ dysfunction indicated by Acute kidney injury, Encephalopathy  and Acute liver injury  Source- ?    Source control Achieved by- see orders  - Merrem day 7 of 7 (completed on 1/3/23)  - Blood cx x2 (1/2/23) final report no growth    1/7 resolved  1/10 overnight elevated temperature reading, no worsening overall pain. Follow up Blood cultures and imagining.

## 2023-01-11 LAB
ALBUMIN SERPL BCP-MCNC: 1.8 G/DL (ref 3.5–5.2)
ALBUMIN SERPL BCP-MCNC: 1.9 G/DL (ref 3.5–5.2)
ALP SERPL-CCNC: 123 U/L (ref 55–135)
ALP SERPL-CCNC: 131 U/L (ref 55–135)
ALP SERPL-CCNC: 131 U/L (ref 55–135)
ALT SERPL W/O P-5'-P-CCNC: 20 U/L (ref 10–44)
ALT SERPL W/O P-5'-P-CCNC: 20 U/L (ref 10–44)
ALT SERPL W/O P-5'-P-CCNC: 23 U/L (ref 10–44)
ANION GAP SERPL CALC-SCNC: 4 MMOL/L (ref 8–16)
ANISOCYTOSIS BLD QL SMEAR: SLIGHT
AST SERPL-CCNC: 45 U/L (ref 10–40)
AST SERPL-CCNC: 45 U/L (ref 10–40)
AST SERPL-CCNC: 47 U/L (ref 10–40)
BASOPHILS # BLD AUTO: 0.01 K/UL (ref 0–0.2)
BASOPHILS # BLD AUTO: 0.02 K/UL (ref 0–0.2)
BASOPHILS NFR BLD: 0.4 % (ref 0–1.9)
BASOPHILS NFR BLD: 0.9 % (ref 0–1.9)
BILIRUB DIRECT SERPL-MCNC: 0.5 MG/DL (ref 0.1–0.3)
BILIRUB SERPL-MCNC: 0.9 MG/DL (ref 0.1–1)
BUN SERPL-MCNC: 13 MG/DL (ref 6–20)
BUN SERPL-MCNC: 13 MG/DL (ref 6–20)
BUN SERPL-MCNC: 15 MG/DL (ref 6–20)
CALCIUM SERPL-MCNC: 7.7 MG/DL (ref 8.7–10.5)
CALCIUM SERPL-MCNC: 7.7 MG/DL (ref 8.7–10.5)
CALCIUM SERPL-MCNC: 7.8 MG/DL (ref 8.7–10.5)
CHLORIDE SERPL-SCNC: 105 MMOL/L (ref 95–110)
CHLORIDE SERPL-SCNC: 106 MMOL/L (ref 95–110)
CHLORIDE SERPL-SCNC: 106 MMOL/L (ref 95–110)
CO2 SERPL-SCNC: 26 MMOL/L (ref 23–29)
CO2 SERPL-SCNC: 27 MMOL/L (ref 23–29)
CO2 SERPL-SCNC: 27 MMOL/L (ref 23–29)
CREAT SERPL-MCNC: 0.5 MG/DL (ref 0.5–1.4)
DIFFERENTIAL METHOD: ABNORMAL
DIFFERENTIAL METHOD: ABNORMAL
EOSINOPHIL # BLD AUTO: 0 K/UL (ref 0–0.5)
EOSINOPHIL # BLD AUTO: 0.1 K/UL (ref 0–0.5)
EOSINOPHIL NFR BLD: 1.8 % (ref 0–8)
EOSINOPHIL NFR BLD: 3 % (ref 0–8)
ERYTHROCYTE [DISTWIDTH] IN BLOOD BY AUTOMATED COUNT: 16.9 % (ref 11.5–14.5)
ERYTHROCYTE [DISTWIDTH] IN BLOOD BY AUTOMATED COUNT: 17 % (ref 11.5–14.5)
EST. GFR  (NO RACE VARIABLE): >60 ML/MIN/1.73 M^2
GIANT PLATELETS BLD QL SMEAR: PRESENT
GLUCOSE SERPL-MCNC: 104 MG/DL (ref 70–110)
GLUCOSE SERPL-MCNC: 104 MG/DL (ref 70–110)
GLUCOSE SERPL-MCNC: 148 MG/DL (ref 70–110)
HCT VFR BLD AUTO: 27.6 % (ref 37–48.5)
HCT VFR BLD AUTO: 28.2 % (ref 37–48.5)
HGB BLD-MCNC: 9 G/DL (ref 12–16)
HGB BLD-MCNC: 9.1 G/DL (ref 12–16)
HYPOCHROMIA BLD QL SMEAR: ABNORMAL
IMM GRANULOCYTES # BLD AUTO: 0.01 K/UL (ref 0–0.04)
IMM GRANULOCYTES # BLD AUTO: 0.01 K/UL (ref 0–0.04)
IMM GRANULOCYTES NFR BLD AUTO: 0.4 % (ref 0–0.5)
IMM GRANULOCYTES NFR BLD AUTO: 0.4 % (ref 0–0.5)
LACTATE SERPL-SCNC: 2 MMOL/L (ref 0.5–2.2)
LYMPHOCYTES # BLD AUTO: 0.4 K/UL (ref 1–4.8)
LYMPHOCYTES # BLD AUTO: 0.5 K/UL (ref 1–4.8)
LYMPHOCYTES NFR BLD: 19.6 % (ref 18–48)
LYMPHOCYTES NFR BLD: 22.2 % (ref 18–48)
MAGNESIUM SERPL-MCNC: 1.8 MG/DL (ref 1.6–2.6)
MCH RBC QN AUTO: 27.8 PG (ref 27–31)
MCH RBC QN AUTO: 28 PG (ref 27–31)
MCHC RBC AUTO-ENTMCNC: 32.3 G/DL (ref 32–36)
MCHC RBC AUTO-ENTMCNC: 32.6 G/DL (ref 32–36)
MCV RBC AUTO: 86 FL (ref 82–98)
MCV RBC AUTO: 86 FL (ref 82–98)
MONOCYTES # BLD AUTO: 0.3 K/UL (ref 0.3–1)
MONOCYTES # BLD AUTO: 0.5 K/UL (ref 0.3–1)
MONOCYTES NFR BLD: 14.3 % (ref 4–15)
MONOCYTES NFR BLD: 22.6 % (ref 4–15)
NEUTROPHILS # BLD AUTO: 1.2 K/UL (ref 1.8–7.7)
NEUTROPHILS # BLD AUTO: 1.4 K/UL (ref 1.8–7.7)
NEUTROPHILS NFR BLD: 50.9 % (ref 38–73)
NEUTROPHILS NFR BLD: 63.5 % (ref 38–73)
NRBC BLD-RTO: 0 /100 WBC
NRBC BLD-RTO: 0 /100 WBC
OVALOCYTES BLD QL SMEAR: ABNORMAL
PHOSPHATE SERPL-MCNC: 3.1 MG/DL (ref 2.7–4.5)
PLATELET # BLD AUTO: 88 K/UL (ref 150–450)
PLATELET # BLD AUTO: 92 K/UL (ref 150–450)
PLATELET BLD QL SMEAR: ABNORMAL
PMV BLD AUTO: 10.6 FL (ref 9.2–12.9)
PMV BLD AUTO: 9.3 FL (ref 9.2–12.9)
POIKILOCYTOSIS BLD QL SMEAR: SLIGHT
POLYCHROMASIA BLD QL SMEAR: ABNORMAL
POTASSIUM SERPL-SCNC: 3.8 MMOL/L (ref 3.5–5.1)
POTASSIUM SERPL-SCNC: 3.8 MMOL/L (ref 3.5–5.1)
POTASSIUM SERPL-SCNC: 4 MMOL/L (ref 3.5–5.1)
PROT SERPL-MCNC: 7.3 G/DL (ref 6–8.4)
RBC # BLD AUTO: 3.22 M/UL (ref 4–5.4)
RBC # BLD AUTO: 3.27 M/UL (ref 4–5.4)
SODIUM SERPL-SCNC: 135 MMOL/L (ref 136–145)
SODIUM SERPL-SCNC: 137 MMOL/L (ref 136–145)
SODIUM SERPL-SCNC: 137 MMOL/L (ref 136–145)
WBC # BLD AUTO: 2.24 K/UL (ref 3.9–12.7)
WBC # BLD AUTO: 2.34 K/UL (ref 3.9–12.7)

## 2023-01-11 PROCEDURE — 99900031 HC PATIENT EDUCATION (STAT)

## 2023-01-11 PROCEDURE — 11000001 HC ACUTE MED/SURG PRIVATE ROOM

## 2023-01-11 PROCEDURE — 97110 THERAPEUTIC EXERCISES: CPT | Mod: CO

## 2023-01-11 PROCEDURE — 25000003 PHARM REV CODE 250: Performed by: INTERNAL MEDICINE

## 2023-01-11 PROCEDURE — 83735 ASSAY OF MAGNESIUM: CPT | Performed by: INTERNAL MEDICINE

## 2023-01-11 PROCEDURE — 63600175 PHARM REV CODE 636 W HCPCS: Performed by: STUDENT IN AN ORGANIZED HEALTH CARE EDUCATION/TRAINING PROGRAM

## 2023-01-11 PROCEDURE — 99233 SBSQ HOSP IP/OBS HIGH 50: CPT | Mod: ,,, | Performed by: STUDENT IN AN ORGANIZED HEALTH CARE EDUCATION/TRAINING PROGRAM

## 2023-01-11 PROCEDURE — 99222 PR INITIAL HOSPITAL CARE,LEVL II: ICD-10-PCS | Mod: ,,, | Performed by: PSYCHIATRY & NEUROLOGY

## 2023-01-11 PROCEDURE — 99233 PR SUBSEQUENT HOSPITAL CARE,LEVL III: ICD-10-PCS | Mod: ,,, | Performed by: STUDENT IN AN ORGANIZED HEALTH CARE EDUCATION/TRAINING PROGRAM

## 2023-01-11 PROCEDURE — 27000207 HC ISOLATION

## 2023-01-11 PROCEDURE — 97535 SELF CARE MNGMENT TRAINING: CPT | Mod: CO

## 2023-01-11 PROCEDURE — 36415 COLL VENOUS BLD VENIPUNCTURE: CPT | Performed by: STUDENT IN AN ORGANIZED HEALTH CARE EDUCATION/TRAINING PROGRAM

## 2023-01-11 PROCEDURE — 25000003 PHARM REV CODE 250: Performed by: STUDENT IN AN ORGANIZED HEALTH CARE EDUCATION/TRAINING PROGRAM

## 2023-01-11 PROCEDURE — 80053 COMPREHEN METABOLIC PANEL: CPT | Performed by: INTERNAL MEDICINE

## 2023-01-11 PROCEDURE — 36415 COLL VENOUS BLD VENIPUNCTURE: CPT | Performed by: INTERNAL MEDICINE

## 2023-01-11 PROCEDURE — 99900035 HC TECH TIME PER 15 MIN (STAT)

## 2023-01-11 PROCEDURE — 80076 HEPATIC FUNCTION PANEL: CPT | Performed by: STUDENT IN AN ORGANIZED HEALTH CARE EDUCATION/TRAINING PROGRAM

## 2023-01-11 PROCEDURE — 85025 COMPLETE CBC W/AUTO DIFF WBC: CPT | Performed by: INTERNAL MEDICINE

## 2023-01-11 PROCEDURE — 80069 RENAL FUNCTION PANEL: CPT | Performed by: STUDENT IN AN ORGANIZED HEALTH CARE EDUCATION/TRAINING PROGRAM

## 2023-01-11 PROCEDURE — 99222 1ST HOSP IP/OBS MODERATE 55: CPT | Mod: ,,, | Performed by: PSYCHIATRY & NEUROLOGY

## 2023-01-11 PROCEDURE — 25000003 PHARM REV CODE 250: Performed by: PSYCHIATRY & NEUROLOGY

## 2023-01-11 RX ORDER — LANOLIN ALCOHOL/MO/W.PET/CERES
400 CREAM (GRAM) TOPICAL DAILY
Status: DISCONTINUED | OUTPATIENT
Start: 2023-01-12 | End: 2023-01-17

## 2023-01-11 RX ORDER — LANOLIN ALCOHOL/MO/W.PET/CERES
400 CREAM (GRAM) TOPICAL ONCE
Status: COMPLETED | OUTPATIENT
Start: 2023-01-11 | End: 2023-01-11

## 2023-01-11 RX ORDER — POTASSIUM CHLORIDE 20 MEQ/1
20 TABLET, EXTENDED RELEASE ORAL DAILY
Status: DISCONTINUED | OUTPATIENT
Start: 2023-01-11 | End: 2023-01-19 | Stop reason: HOSPADM

## 2023-01-11 RX ADMIN — ALPRAZOLAM 0.25 MG: 0.25 TABLET ORAL at 04:01

## 2023-01-11 RX ADMIN — BUPROPION HYDROCHLORIDE 100 MG: 100 TABLET, FILM COATED ORAL at 10:01

## 2023-01-11 RX ADMIN — LACTULOSE 20 G: 20 SOLUTION ORAL at 10:01

## 2023-01-11 RX ADMIN — POLYETHYLENE GLYCOL (3350) 17 G: 17 POWDER, FOR SOLUTION ORAL at 10:01

## 2023-01-11 RX ADMIN — MEROPENEM 1 G: 1 INJECTION, POWDER, FOR SOLUTION INTRAVENOUS at 08:01

## 2023-01-11 RX ADMIN — METHOCARBAMOL TABLETS 500 MG: 500 TABLET, COATED ORAL at 08:01

## 2023-01-11 RX ADMIN — METHOCARBAMOL TABLETS 500 MG: 500 TABLET, COATED ORAL at 12:01

## 2023-01-11 RX ADMIN — GABAPENTIN 300 MG: 300 CAPSULE ORAL at 08:01

## 2023-01-11 RX ADMIN — PANTOPRAZOLE SODIUM 40 MG: 40 TABLET, DELAYED RELEASE ORAL at 10:01

## 2023-01-11 RX ADMIN — MAGNESIUM OXIDE TAB 400 MG (241.3 MG ELEMENTAL MG) 400 MG: 400 (241.3 MG) TAB at 10:01

## 2023-01-11 RX ADMIN — MEROPENEM 1 G: 1 INJECTION, POWDER, FOR SOLUTION INTRAVENOUS at 05:01

## 2023-01-11 RX ADMIN — METHOCARBAMOL TABLETS 500 MG: 500 TABLET, COATED ORAL at 10:01

## 2023-01-11 RX ADMIN — HYDROCODONE BITARTRATE AND ACETAMINOPHEN 1 TABLET: 5; 325 TABLET ORAL at 04:01

## 2023-01-11 RX ADMIN — LACTULOSE 20 G: 20 SOLUTION ORAL at 08:01

## 2023-01-11 RX ADMIN — BUPROPION HYDROCHLORIDE 100 MG: 100 TABLET, FILM COATED ORAL at 08:01

## 2023-01-11 RX ADMIN — GABAPENTIN 300 MG: 300 CAPSULE ORAL at 10:01

## 2023-01-11 RX ADMIN — MEROPENEM 1 G: 1 INJECTION, POWDER, FOR SOLUTION INTRAVENOUS at 12:01

## 2023-01-11 RX ADMIN — LACTULOSE 20 G: 20 SOLUTION ORAL at 03:01

## 2023-01-11 RX ADMIN — ALPRAZOLAM 0.25 MG: 0.25 TABLET ORAL at 10:01

## 2023-01-11 RX ADMIN — GABAPENTIN 300 MG: 300 CAPSULE ORAL at 03:01

## 2023-01-11 RX ADMIN — HYDROCODONE BITARTRATE AND ACETAMINOPHEN 1 TABLET: 5; 325 TABLET ORAL at 10:01

## 2023-01-11 RX ADMIN — METHOCARBAMOL TABLETS 500 MG: 500 TABLET, COATED ORAL at 04:01

## 2023-01-11 RX ADMIN — POTASSIUM CHLORIDE 20 MEQ: 1500 TABLET, EXTENDED RELEASE ORAL at 10:01

## 2023-01-11 NOTE — CONSULTS
"[facility] Surg  Neurology  Consult Note    Patient Name: Rachel Zazueta  MRN: [id number]  Admission Date: 12/23/2022  Hospital Length of Stay: 19 days  Code Status: Full Code   Attending Provider: Dannielle Merino DO   Consulting Provider: Jorje Guerrero MD  Primary Care Physician: Dannielle Merino DO  Principal Problem:Debility    Inpatient consult to [facility]-General Neurology  Consult performed by: Jorje Guerrero MD  Consult ordered by: Dannielle Merino DO      Subjective:     Chief Complaint: "I can't move my legs"    HPI: 42-year-old female with medical of history of anemia, anxiety, recurrent cellulitis, hepatitis C, IVDU, liver cirrhosis, cholecystectomy, kidney stones, lumbar fusion, shoulder surgery came to ED for generalized weakness  Pt has been consulted for LE weakness. She sates that back in 12/2022 she fell and could not walk. This has been associated with tingling in her legs. No bowel/bladder disturbances. Denies speech disturbances, involvement of UE's, vertigo. She has a left "lazy eye" for a few years ago.       From primary team's note:  IM HPI:  Patient is well known to our service.  Patient has a history of IV drug abuse and unfortunately has relapsed.  Patient has a history of a epidural abscess requiring a large lumbar surgery with multiple rehabilitative stays and therapy.  The patient was progressing to ambulation and recently has declined.  Patient admits to starting drugs including IV drug use again.  Patient presented to the ED with the above symptoms and she has a noticeable cardiac murmur today that we are getting a stat echocardiogram.      Past Medical History:   Diagnosis Date    Anemia     Anxiety     Depressed     Diskitis     Hep C w/o coma, chronic     IV drug abuse     Kidney stone     Liver cirrhosis        Past Surgical History:   Procedure Laterality Date    ARTHROTOMY OF SHOULDER Left 11/15/2022    Procedure: ARTHROTOMY, SHOULDER;  Surgeon: Bjorn FENTON" MD Freddy;  Location: [facility];  Service: Orthopedics;  Laterality: Left;  Left acromioclavicular joint arthrotomy    BACK SURGERY      benine tumor removal      forehead, age 9    CHOLECYSTECTOMY      KIDNEY SURGERY      LUMBAR FUSION Bilateral 3/2/2022    Procedure: FUSION, SPINE, LUMBAR;  Surgeon: Guille Templeton MD;  Location: [facility] OR [address] [place] [address];  Service: Neurosurgery;  Laterality: Bilateral;  AIRO  T10--Pelvis    REMOVAL OF HARDWARE FROM SPINE N/A 2/21/2022    Procedure: REMOVAL, HARDWARE, SPINE;  Surgeon: Guille Templeton MD;  Location: [facility] OR [address] [place] [address];  Service: Neurosurgery;  Laterality: N/A;  [place]    SPINE SURGERY         Review of patient's allergies indicates:   Allergen Reactions    Bee venom protein (honey bee) Anaphylaxis     Patient reports she is allergic to bee stings.    Naproxen Anaphylaxis     Throat closing    12-23- Patient reports taking Ibuprofen 200 mg at home without problems. Verified X3. [place]    Wasp sting [allergen ext-venom-honey bee] Anaphylaxis    Adhesive Blisters    Iodine and iodide containing products Hives     Allergic to iodine in seafood only    Shellfish containing products     Nuts [tree nut] Rash       Current Neurological Medications:     No current facility-administered medications on file prior to encounter.     Current Outpatient Medications on File Prior to Encounter   Medication Sig    albuterol (ACCUNEB) 1.25 mg/3 mL Nebu Take 3 mLs (1.25 mg total) by nebulization every 6 (six) hours as needed (shortness of breath). Rescue    buPROPion (WELLBUTRIN) 100 MG tablet Take 1 tablet (100 mg total) by mouth 2 (two) times daily.    folic acid (FOLVITE) 1 MG tablet Take 1 tablet (1 mg total) by mouth once daily. (Patient not taking: Reported on 11/25/2022)    pantoprazole (PROTONIX) 40 MG tablet Take 1 tablet (40 mg total) by mouth once daily.    [DISCONTINUED] diclofenac sodium (VOLTAREN) 1 % Gel Apply 2 g topically 4 (four) times daily.      Family History       Problem Relation  (Age of Onset)    Cirrhosis Father    Drug abuse Mother, Father    Hepatitis Mother, Father    Liver cancer Mother          Tobacco Use    Smoking status: Some Days     Packs/day: 0.50     Years: 23.00     Pack years: 11.50     Types: Cigarettes    Smokeless tobacco: Never    Tobacco comments:     patient states she knows she nees to quit.   Substance and Sexual Activity    Alcohol use: Not Currently     Comment: quit 2014    Drug use: Yes     Frequency: 4.0 times per week     Types: Marijuana     Comment: Patient denies needle use, only inhalation of methampehtamines or orally.  Last known drug use was prior to admission to hospital stay for surgery    Sexual activity: Yes     Partners: Male     Birth control/protection: None     Review of Systems   Constitutional:  Negative for fever.   HENT:  Negative for trouble swallowing.    Eyes:  Negative for photophobia.   Respiratory:  Negative for shortness of breath.    Cardiovascular:  Negative for chest pain.   Gastrointestinal:  Negative for abdominal pain.   Genitourinary:  Negative for dysuria.   Musculoskeletal:  Negative for back pain.   Neurological:  Positive for weakness and numbness. Negative for headaches.   Psychiatric/Behavioral:  Negative for confusion.    Objective:     Vital Signs (Most Recent):  Temp: 98.4 °F (36.9 °C) (01/11/23 1128)  Pulse: 100 (01/11/23 1128)  Resp: 20 (01/11/23 1128)  BP: 115/62 (01/11/23 1128)  SpO2: 100 % (01/11/23 1128) Vital Signs (24h Range):  Temp:  [98.1 °F (36.7 °C)-101.2 °F (38.4 °C)] 98.4 °F (36.9 °C)  Pulse:  [100-127] 100  Resp:  [17-20] 20  SpO2:  [98 %-100 %] 100 %  BP: (115-135)/(58-62) 115/62     Weight: 97.5 kg (214 lb 15.2 oz)  Body mass index is 33.67 kg/m².    Physical Exam  Constitutional:       General: She is not in acute distress.  Pulmonary:      Effort: No respiratory distress.   Neurological:      Mental Status: She is oriented to person, place, and time.   Psychiatric:         Speech: Speech normal.        NEUROLOGICAL EXAMINATION:     MENTAL STATUS   Oriented to person, place, and time.   Speech: speech is normal   Level of consciousness: alert    CRANIAL NERVES     CN III, IV, VI   Conjugate gaze absent: left eye partially abducts on primary position.    CN VII   Right facial weakness: none  Left facial weakness: none    MOTOR EXAM        AROM of UE's against gravity  Slight ABD/ADD of hips     Significant Labs: CBC:   Recent Labs   Lab 01/10/23  0546 01/10/23  2350 01/11/23  0524   WBC 1.42* 2.24* 2.34*   HGB 8.7* 9.0* 9.1*   HCT 27.2* 27.6* 28.2*   PLT 73* 88* 92*     CMP:   Recent Labs   Lab 01/10/23  0546 01/10/23  2350 01/11/23  0523    148* 104  104    135* 137  137   K 3.8 4.0 3.8  3.8    105 106  106   CO2 27 26 27  27   BUN 16 15 13  13   CREATININE 0.5 0.5 0.5  0.5   CALCIUM 8.0* 7.8* 7.7*  7.7*   MG 1.9  --  1.8   PROT 7.2 7.3 7.3  7.3   ALBUMIN 1.8* 1.9* 1.8*  1.8*  1.8*   BILITOT 1.0 0.9 0.9  0.9   ALKPHOS 124 123 131  131   AST 39 47* 45*  45*   ALT 19 23 20  20   ANIONGAP 4* 4* 4*  4*     Urine Culture: No results for input(s): LABURIN in the last 48 hours.  Urine Studies: No results for input(s): COLORU, APPEARANCEUA, PHUR, SPECGRAV, PROTEINUA, GLUCUA, KETONESU, BILIRUBINUA, OCCULTUA, NITRITE, UROBILINOGEN, LEUKOCYTESUR, RBCUA, WBCUA, BACTERIA, SQUAMEPITHEL, HYALINECASTS in the last 48 hours.    Invalid input(s): WRIGHTSUR    Significant Imaging: I have reviewed all pertinent imaging results/findings within the past 24 hours.    MRI brain  Impression:     1. No acute intracranial abnormality identified.  2. Nonspecific gliotic white matter signal change within bilateral cerebral hemispheres similar in extent and distribution greater than expected for patient age.  This may be related to chronic small vessel ischemia.  Numerous chronic systemic illnesses can result in this appearance.  Demyelinating disease is also in the differential.        Electronically  signed by: Deion Patel MD  Date:                                            01/10/2023  Time:                                           14:55    Assessment and Plan:     43 y/o with weakness or LE's    Paraparesis: etiology is yet to be determined. MRI brain with white matter changes that are concerning for demyelinating disease vs ischemic changes. She has a fever for which epidural collection needs to be R/O.  MRI of spine, demyelinating protocol to see is any spinal cord lesion or signs of compressive myelopathy.    Active Diagnoses:    Diagnosis Date Noted POA    PRINCIPAL PROBLEM:  Debility [R53.81] 01/05/2023 No    Pulmonary hypertension [I27.20] 12/24/2022 Yes    Murmur, cardiac [R01.1] 12/23/2022 Yes    Severe sepsis [A41.9, R65.20] 12/23/2022 Yes    Mild episode of recurrent major depressive disorder [F33.0] 02/09/2022 Yes     Chronic    Amphetamine use disorder, severe [F15.20] 04/15/2021 Yes    Cannabis use disorder, moderate, dependence [F12.20] 04/15/2021 Yes    Spinal stenosis at L4-L5 level [M48.061] 04/14/2021 Yes    Substance use disorder [F19.90] 03/15/2017 Yes    Chronic hepatitis C with cirrhosis [B18.2, K74.60] 01/08/2017 Yes    Cirrhosis of liver without ascites [K74.60] 12/06/2016 Yes    Pancytopenia [D61.818] 03/04/2015 Yes      Problems Resolved During this Admission:    Diagnosis Date Noted Date Resolved POA    Pulmonary hyperinflation [R09.89] 12/24/2022 12/24/2022 Unknown    UTI (urinary tract infection) [N39.0] 03/04/2015 01/06/2023 Yes       VTE Risk Mitigation (From admission, onward)           Ordered     IP VTE HIGH RISK PATIENT  Once         12/23/22 0647     Place sequential compression device  Until discontinued         12/23/22 0647                    Thank you for your consult. I will follow-up with patient. Please contact us if you have any additional questions.    Jorje Guerrero MD  Neurology  [facility]

## 2023-01-11 NOTE — ASSESSMENT & PLAN NOTE
Labs noted, stable.  AST   Date Value Ref Range Status   01/11/2023 45 (H) 10 - 40 U/L Final   01/11/2023 45 (H) 10 - 40 U/L Final   01/10/2023 47 (H) 10 - 40 U/L Final   01/10/2023 39 10 - 40 U/L Final   01/09/2023 38 10 - 40 U/L Final     ALT   Date Value Ref Range Status   01/11/2023 20 10 - 44 U/L Final   01/11/2023 20 10 - 44 U/L Final   01/10/2023 23 10 - 44 U/L Final   01/10/2023 19 10 - 44 U/L Final   01/09/2023 18 10 - 44 U/L Final        Alkaline Phosphatase   Date Value Ref Range Status   01/11/2023 131 55 - 135 U/L Final   01/11/2023 131 55 - 135 U/L Final   01/10/2023 123 55 - 135 U/L Final   01/10/2023 124 55 - 135 U/L Final   01/09/2023 121 55 - 135 U/L Final

## 2023-01-11 NOTE — ASSESSMENT & PLAN NOTE
Counts are stable at this time.  Two days of leukocyte count dropping 2.39 to 1.76 and 1.42.   Overnight febrile event >101F, patient however feels better compared to yesterday evening.   1/11: Morning, vital signs stable, no tachycardia, BP normotensive.   - f/u blood cultures, NGTD x 1 day  - resumed empiric antibiotics, merrem after two days of elevated temperature >101F and downtrending ANC (900/ul)

## 2023-01-11 NOTE — PT/OT/SLP PROGRESS
Occupational Therapy   Treatment    Name: Rachel Zazueta  MRN: [id number]  Admitting Diagnosis:  Debility       Recommendations:     Discharge Recommendations: other (see comments) (TBD)  Discharge Equipment Recommendations:  other (see comments) (TBD)  Barriers to discharge:  Inaccessible home environment (medical and functional status)    Assessment:     Rachel Zazueta is a 42 y.o. female with a medical diagnosis of Debility.  She presents with fair participation. Performance deficits affecting function are weakness, impaired endurance, impaired self care skills, impaired functional mobility, impaired balance, decreased upper extremity function, decreased lower extremity function, decreased safety awareness, pain, decreased ROM.     Rehab Prognosis:  Fair; patient would benefit from acute skilled OT services to address these deficits and reach maximum level of function.       Plan:     Patient to be seen 5 x/week to address the above listed problems via self-care/home management, therapeutic activities, therapeutic exercises  Plan of Care Expires: 01/20/23  Plan of Care Reviewed with: patient    Subjective     Pain/Comfort:  Pain Rating 1: 8/10  Location - Side 1: Bilateral  Location - Orientation 1: generalized  Location 1: rib(s)  Pain Addressed 1: Pre-medicate for activity, Reposition, Cessation of Activity  Pain Rating Post-Intervention 1: 8/10    Objective:     Communicated with: OT and RN prior to session.  Patient found HOB elevated with peripheral IV, PureWick upon OT entry to room.    General Precautions: Standard, contact, fall    Orthopedic Precautions:N/A  Braces: N/A  Respiratory Status: Room air     Occupational Performance:       Activities of Daily Living:  Grooming: modified independence supine in bed with HOB elevated      AMPA 6 Click ADL: 16    Treatment & Education:  Patient engaged in (B) there-ex for 2 sets x 10 reps with SBA supine in bed with HOB elevated in the following planes: sh  flex/ext, sh int/ext rotation, sh horizonal abd/adduction, elbow flex/ext, wrist flex/ext, scapular ret/protraction and scapular elevation/depression to facilitate an increase in strength, endurance, overall performance with ADL/IADLs. Rest breaks needed b/w sets. Pt only could do R shoulder flexion/extension due to rib pain/muscle spasms. Pt completed grooming task with mod I supine in bed with HOB elevated. Pt reported pain when sitting bed higher than 45 degree angle.     Patient left HOB elevated with all lines intact and call button in reach    GOALS:   Multidisciplinary Problems       Occupational Therapy Goals          Problem: Occupational Therapy    Goal Priority Disciplines Outcome Interventions   Occupational Therapy Goal     OT, PT/OT Ongoing, Progressing    Description: Goals to be met by: 01/20/22     Patient will increase functional independence with ADLs by performing:    UE Dressing with Set-up Assistance.  LE Dressing with Minimal Assistance.  Grooming while seated with Modified [place].  Toileting from toilet with Supervision for hygiene and clothing management.   Bathing from  shower chair/bench with Minimal Assistance.  Step transfer with Minimal Assistance  Toilet transfer to toilet with Minimal Assistance.  Increased functional strength to 4/5 for bilateral UE's.                         Time Tracking:     OT Date of Treatment: 01/11/23  OT Start Time: 1052  OT Stop Time: 1123  OT Total Time (min): 31 min    Billable Minutes:Self Care/Home Management 10  Therapeutic Exercise 21    OT/ROSS: ROSS     ROSS Visit Number: 1 1/11/2023

## 2023-01-11 NOTE — ASSESSMENT & PLAN NOTE
Lower extremity weakness bilaterally now requiring aggressive OT and PT efforts to prevent further weakness and also regain strength necessary to carry out ADLs.   - From early bed bound state, patient has required multiple nursing staff in safely getting in and out of bed   - Original discharge plans with outpatient rehab will cause more harm to patient at this time and case management working to find a placement at SNF with PT and OT efforts  1/9 continue efforts with PT and OT, awaiting placement  1/10 slow progress with PT and OT, will obtain MRI brain and spine to identify any concerning changes  1/11 MRI limitations to TL spine study secondary to hardware, brain and cervical scan noting no acute findings;   1. No acute intracranial abnormality identified.  2. Nonspecific gliotic white matter signal change within bilateral cerebral hemispheres similar in extent and distribution greater than expected for patient age. This may be related to chronic small vessel ischemia.  Numerous chronic systemic illnesses can result in this appearance. Demyelinating disease is also in the differential.  Multiple prolonged hospitalization, neurosurgical intervention, chronic inflammation, substance abuse all likely contributing to current state. Outpatient follow up with specialists have been interrupted with hospitalizations and inconclusive studies. Will follow up with neurology for evaluation and optimize current therapy.

## 2023-01-11 NOTE — PT/OT/SLP PROGRESS
Occupational Therapy   Treatment    Name: Rachel Zazueta  MRN: [id number]  Admitting Diagnosis:  Debility       Recommendations:     Discharge Recommendations: other (see comments) (TBD)  Discharge Equipment Recommendations:  other (see comments) (TBD)  Barriers to discharge:  Other (Comment) (Medical and functional status)    Assessment:     Rachel Zazueta is a 42 y.o. female with a medical diagnosis of Debility.  She presents with improved functional performance with bed mobility as noted by min assist; however, pain, weakness, and anxiety continue to limited overall activity tolerance. Performance deficits affecting function are weakness, impaired endurance, impaired self care skills, impaired functional mobility, decreased safety awareness, pain, decreased ROM.     Rehab Prognosis:  Fair; patient would benefit from acute skilled OT services to address these deficits and reach maximum level of function.       Plan:     Patient to be seen 5 x/week to address the above listed problems via self-care/home management, therapeutic activities, therapeutic exercises  Plan of Care Expires: 01/09/23 (Informed OT and will expand POC date)  Plan of Care Reviewed with: patient    Subjective     Pain/Comfort:  Pain Rating 1: 10/10  Location - Side 1: Bilateral  Location - Orientation 1: lower  Location 1: back  Pain Addressed 1: Reposition, Cessation of Activity  Pain Rating Post-Intervention 1: 8/10    Objective:     Communicated with: nurse prior to session.  Patient found supine with PureWick upon OT entry to room.    General Precautions: Standard, fall, contact    Orthopedic Precautions:N/A  Braces: N/A  Respiratory Status: Room air     Occupational Performance:     Bed Mobility:    Patient completed Rolling/Turning to Left with  minimum assistance  Patient completed Rolling/Turning to Right with minimum assistance  Patient completed Scooting/Bridging with minimum assistance  Patient completed Supine to Sit with  moderate assistance  Patient completed Sit to Supine with moderate assistance     Functional Mobility/Transfers:  Pt refused standing activity on this date.  Functional Mobility: Pt unable to ambulate at this time.    Activities of Daily Living:  Lower Body Dressing: maximal assistance        [facility] 6 Click ADL:      Treatment & Education:  Pt was cooperative with minimal to moderate verbal encouragement while exhibiting more positive affect. She performed bed mobility requiring min to mod assist secondary to some lifting assist and stabilization of trunk during supine to sit transition, steadying assist of trunk when scooting to EOB, and lifting assist of bilateral LE's during sit to supine transition with mod verbal and tactile cueing for technique to reduce pain and facilitate improved performance. Pt then participated in ADL retraining regarding LB dressing emphasizing use of compensatory strategies requiring max assist to [place] bilateral socks with additional steadying assist of trunk with minimal excursions. Also, she participated in therapeutic activity addressing functional reaching, gross motor coordination, trunk control, static / dynamic sitting balance, and energy for task / endurance challenging her to reach in multiple planes utilizing bilateral Ue's requiring mod verbal and tactile cueing to facilitate proper weight shifting and sitting posture tolerating approx 11 min at EOB.     Patient left supine with all lines intact, call button in reach, and nurse notified    GOALS:   Multidisciplinary Problems       Occupational Therapy Goals          Problem: Occupational Therapy    Goal Priority Disciplines Outcome Interventions   Occupational Therapy Goal     OT, PT/OT Ongoing, Progressing    Description: Goals to be met by: 01/20/22     Patient will increase functional independence with ADLs by performing:    UE Dressing with Set-up Assistance.  LE Dressing with Minimal Assistance.  Grooming while seated  with Modified [place].  Toileting from toilet with Supervision for hygiene and clothing management.   Bathing from  shower chair/bench with Minimal Assistance.  Step transfer with Minimal Assistance  Toilet transfer to toilet with Minimal Assistance.  Increased functional strength to 4/5 for bilateral UE's.                         Time Tracking:     OT Date of Treatment: 01/10/23  OT Start Time: 1735  OT Stop Time: 1805  OT Total Time (min): 30 min    Billable Minutes:Self Care/Home Management 12  Therapeutic Activity 18    OT/ROSS: OT     ROSS Visit Number: 1    1/10/2023

## 2023-01-11 NOTE — PLAN OF CARE
Problem: Occupational Therapy  Goal: Occupational Therapy Goal  Description: Goals to be met by: 01/20/22     Patient will increase functional independence with ADLs by performing:    UE Dressing with Set-up Assistance.  LE Dressing with Minimal Assistance.  Grooming while seated with Modified [place].  Toileting from toilet with Supervision for hygiene and clothing management.   Bathing from  shower chair/bench with Minimal Assistance.  Step transfer with Minimal Assistance  Toilet transfer to toilet with Minimal Assistance.  Increased functional strength to 4/5 for bilateral UE's.    Outcome: Ongoing, Progressing

## 2023-01-11 NOTE — PLAN OF CARE
Problem: Occupational Therapy  Goal: Occupational Therapy Goal  Description: Goals to be met by: 01/20/22     Patient will increase functional independence with ADLs by performing:    UE Dressing with Set-up Assistance.  LE Dressing with Minimal Assistance.  Grooming while seated with Modified [place].  Toileting from toilet with Supervision for hygiene and clothing management.   Bathing from  shower chair/bench with Minimal Assistance.  Step transfer with Minimal Assistance  Toilet transfer to toilet with Minimal Assistance.  Increased functional strength to 4/5 for bilateral UE's.    Outcome: Ongoing, Progressing   Continue with POC

## 2023-01-11 NOTE — PROGRESS NOTES
"[facility] Medicine  Progress Note    Patient Name: Rachel Zazueta  MRN: [id number]  Patient Class: IP- Inpatient   Admission Date: 12/23/2022  Length of Stay: 19 days  Attending Physician: Dannielle Merino DO  Primary Care Provider: Dannielle Merino DO    Subjective:     Principal Problem:Debility    HPI:  ER HPI:  42-year-old female with a history of anemia, anxiety, recurrent cellulitis, hepatitis C, IVDU, liver cirrhosis, cholecystectomy, kidney stones, lumbar fusion, shoulder surgery, chronic back pain comes in c/o generalized weakness, fatigue, and bilateral flank pain.  She reports that this has been going on intermittently for several days and was diagnosed with a UTI recently.  No fever.  No abdominal pain or vomiting or diarrhea.     IM HPI:  Patient is well known to our service.  Patient has a history of IV drug abuse and unfortunately has relapsed.  Patient has a history of a epidural abscess requiring a large lumbar surgery with multiple rehabilitative stays and therapy.  The patient was progressing to ambulation and recently has declined.  Patient admits to starting drugs including IV drug use again.  Patient presented to the ED with the above symptoms and she has a noticeable cardiac murmur today that we are getting a stat echocardiogram.  Patient's been started on antibiotics fluid resuscitated and seems to have sepsis.  Patient's urinary studies were abnormal but not overwhelming.      Overview/Hospital Course:  12/24/22 CG: Patient remains in the ICU today. Has a lot of muscle spasms and low back pain. Echocardiogram ordered yesterday and completed; significant for pulm HTN but without vegetations.   12/25/22 CG:  Overnight she was having a lot of significant discomfort and pain.  We placed her on Precedex and this has helped significantly with her body aches, her irritability and muscle spasms."  12/26/22 FM:  Patient required Precedex for agitation and mood changes.  " Patient is calm and cooperative this morning.  Patient appears to have E coli sepsis so will taper antibiotics.  Patient's echocardiogram showed no vegetation and E coli would be low risk for metastatic infection.  Will transfer the patient to the floor once her Precedex discontinue we counseled her again today on the importance of avoiding substances and illicit drugs.  This continues to be setback for her.  12/27/22 WC:  Patient overall remains stable and is slowly improving.  Urine culture has revealed Candida albicans in addition to blood culture revealing E coli.  12/28/22 WC:  Patient resting comfortably this morning.  Pharm ID on case for treatment duration recs.    12/29/22 WC:  no acute events overnight.  Back pain at this time.   12/20/22:  no acute events continue IV ABX for esbl  12/31/22 CG: stable, continuing antibiotics.   1/1/2 CG: stable, continuing antibiotics. Has not had a bowel movement in two weeks. Will give lactulose.    01/02/2023: Patient did have a bowel movement yesterday.  Completing last day of antimicrobial therapy anticipated discharge tomorrow.  1/3/2023: Improving BM with lactulose. Lower extremity weakness requiring aggressive PT and OT efforts. Patient understands she needs to be an active participant. Encouraged inbed physical exercises without assistance with therapists. Stable Hg/HCT.   1/4/2023: Lactulose every other day for regular bowel movement. PT and OT recommendations with continue efforts to help with lower extremity weakness which likely precipitated from minimal participation from patient over the past week to remain physically active. Hg/HCT remains stable. Last blood Cx x2 (12/28) NGTD x5 days. Patient agrees moving forward lower extremity strengthening will have to take place with her participation and motivation. Will discharge with continue PT and OT efforts outpatient. She acknowledges outpatient follow up with GI/hepatologist, hematologist and neurologist  required to address all other chronic medication conditions.   1/5/2023: Overnight report from nursing that patient exhibited weakness in lower extremities and in upper body strength to support self to safely transfer from bed to commode or even bed to wheelchair. CM involved in placement to SNF. Continue with PT and OT. All other medical condition addressed during hospitalization remains stable.  1/6/2023: CM working on SNF placement. Patient expresses continue effort on her part to regain her strength, reassured her that she has been able to participate following the leads of PT and OT and so a new injury is less likely at this time. While small, she endorsed sitting up at bedside with legs dangle.    1/7/23 ND patient feeling well this morning no complaints will continue therapy services and rehab placement  1/8/23 ND pt reports some back pain will repeat x-rays to make sure everything looks okay from previous surgery.  Otherwise she is doing well  1/9/23 Repeat xray showing increased kyphosis over TL junction. Today, pain associated with massaging feet bilaterally with no worsening of numbness and tingling. No reported concerns with PT and OT efforts. Awaiting placement.  1/10/23 Overnight elevated temperature >101F, no further discomfort at this time and on morning exam denies further symptoms and worsening pain over joints and spine. PT and OT efforts moving forward, but still has significant muscle weakness proximally and over her core. White cells reduced, CRP elevated. Will obtain MRI scan brain and spine to follow up with worsening scoliosis, identify occult findings.    1/11/23 Second evening with elevated temp >101F with chills and tachycardia. Sepsis precautions taken with labs and cultures. No obvious infectious source, but empirically started merrem for neutropenic fever precautions. Patient this morning endorses good appetite and hopeful in being able to sit up longer today. Yesterday, she was able  to stay seated 10 minutes from 3 minutes, the day prior. Pancytopenia profile back to patient's baseline. Overall pain stable and no reported worsening. Lower extremity weakness with loss of flexion at knees and hips against gravity still prominent. Will appreciate neurology consult. Continue PT and OT.       Interval History: Patient seen and examined.     Review of Systems   Constitutional:  Negative for activity change, appetite change, chills, diaphoresis, fatigue and fever.   HENT:  Negative for sore throat.    Eyes:  Negative for pain.   Respiratory:  Negative for cough, choking, chest tightness and shortness of breath.    Cardiovascular:  Negative for chest pain, palpitations and leg swelling.   Gastrointestinal:  Negative for abdominal pain, constipation, diarrhea, nausea, rectal pain and vomiting.   Genitourinary:  Negative for dyspareunia, dysuria, flank pain, frequency, genital sores, hematuria and menstrual problem.   Musculoskeletal:  Positive for arthralgias, back pain, gait problem, myalgias and neck pain.   Skin:  Negative for pallor, rash and wound.   Neurological:  Positive for weakness. Negative for dizziness, tremors, speech difficulty and headaches.   Psychiatric/Behavioral:  Negative for agitation, behavioral problems, confusion, decreased concentration and dysphoric mood.    Objective:     Vital Signs (Most Recent):  Temp: 98.1 °F (36.7 °C) (01/11/23 0737)  Pulse: 103 (01/11/23 0737)  Resp: 20 (01/11/23 0737)  BP: (!) 120/58 (01/11/23 0737)  SpO2: 98 % (01/11/23 0737)   Vital Signs (24h Range):  Temp:  [98.1 °F (36.7 °C)-101.2 °F (38.4 °C)] 98.1 °F (36.7 °C)  Pulse:  [103-127] 103  Resp:  [17-20] 20  SpO2:  [98 %] 98 %  BP: (118-135)/(58-60) 120/58     Weight: 97.5 kg (214 lb 15.2 oz)  Body mass index is 33.67 kg/m².    Intake/Output Summary (Last 24 hours) at 1/11/2023 0943  Last data filed at 1/11/2023 0749  Gross per 24 hour   Intake 2480 ml   Output 400 ml   Net 2080 ml      Physical  Exam  Vitals and nursing note reviewed.   Constitutional:       General: She is not in acute distress.     Appearance: She is obese.   HENT:      Head: Normocephalic and atraumatic.      Right Ear: External ear normal.      Left Ear: External ear normal.      Nose: Nose normal. No congestion or rhinorrhea.      Mouth/Throat:      Mouth: Mucous membranes are moist.      Pharynx: No oropharyngeal exudate.   Eyes:      General: No scleral icterus.     Extraocular Movements: Extraocular movements intact.      Conjunctiva/sclera: Conjunctivae normal.   Neck:      Vascular: No carotid bruit.      Comments: Limited ROM at baseline  Cardiovascular:      Rate and Rhythm: Normal rate and regular rhythm.      Heart sounds: Murmur heard.     No friction rub. No gallop.   Pulmonary:      Effort: No respiratory distress.      Breath sounds: No stridor. No wheezing, rhonchi or rales.   Chest:      Chest wall: No tenderness.   Abdominal:      General: There is no distension.      Palpations: Abdomen is soft. There is no mass.      Tenderness: There is no abdominal tenderness. There is no right CVA tenderness, left CVA tenderness or guarding.   Musculoskeletal:         General: No swelling, tenderness or deformity.      Cervical back: Normal range of motion and neck supple. No rigidity or tenderness.      Right lower leg: No edema.      Left lower leg: No edema.   Lymphadenopathy:      Cervical: No cervical adenopathy.   Skin:     General: Skin is dry.      Capillary Refill: Capillary refill takes less than 2 seconds.      Coloration: Skin is not jaundiced or pale.      Findings: No bruising.   Neurological:      Mental Status: She is oriented to person, place, and time. Mental status is at baseline.      Cranial Nerves: No cranial nerve deficit.      Sensory: Sensory deficit present.      Motor: Weakness (unable to lift legs against gravity, also unable to plant feet with hips and knees flexed bilaterally, no SI joint tenderness,  tenderness over spinous processes at baseline) present.   Psychiatric:         Mood and Affect: Mood normal.         Behavior: Behavior normal.         Thought Content: Thought content normal.      Comments: Calm, cooperative     Significant Labs: All pertinent labs within the past 24 hours have been reviewed.  Bilirubin:   Recent Labs   Lab 01/08/23  0529 01/09/23  0538 01/10/23  0546 01/10/23  2350 01/11/23  0523   BILIDIR  --   --   --   --  0.5*   BILITOT 0.9 1.0 1.0 0.9 0.9  0.9     Blood Culture:   Recent Labs   Lab 01/10/23  0616 01/10/23  0621   LABBLOO No growth to date No growth to date     BMP:   Recent Labs   Lab 01/11/23 0523     104     137   K 3.8  3.8     106   CO2 27  27   BUN 13  13   CREATININE 0.5  0.5   CALCIUM 7.7*  7.7*   MG 1.8     CBC:   Recent Labs   Lab 01/10/23  0546 01/10/23  2350 01/11/23  0524   WBC 1.42* 2.24* 2.34*   HGB 8.7* 9.0* 9.1*   HCT 27.2* 27.6* 28.2*   PLT 73* 88* 92*     CMP:   Recent Labs   Lab 01/10/23  0546 01/10/23  2350 01/11/23  0523    135* 137  137   K 3.8 4.0 3.8  3.8    105 106  106   CO2 27 26 27  27    148* 104  104   BUN 16 15 13  13   CREATININE 0.5 0.5 0.5  0.5   CALCIUM 8.0* 7.8* 7.7*  7.7*   PROT 7.2 7.3 7.3  7.3   ALBUMIN 1.8* 1.9* 1.8*  1.8*  1.8*   BILITOT 1.0 0.9 0.9  0.9   ALKPHOS 124 123 131  131   AST 39 47* 45*  45*   ALT 19 23 20  20   ANIONGAP 4* 4* 4*  4*     Lactic Acid:   Recent Labs   Lab 01/10/23  2350   LACTATE 2.0     Magnesium:   Recent Labs   Lab 01/10/23  0546 01/11/23 0523   MG 1.9 1.8     MRI Cervical Spine Without Contrast  Narrative: EXAMINATION:  MRI CERVICAL SPINE WITHOUT CONTRAST    CLINICAL HISTORY:  Generalized weakness    COMPARISON:  No priors available    TECHNIQUE:  Multiplanar multisequence MR imaging performed of the cervical spine without contrast    FINDINGS:  Imaged portions of the spinal cord demonstrate normal size and signal.  Vertebral bodies are  normal in height and alignment.  Artifact present on portions of this exam.  Degenerative related endplate signal changes at C4 through C7.  Marginal osteophytes are present at these levels.    C2-C3: Disc normal in height without herniation.  No spinal canal or foraminal narrowing detected.    C3-C4: Slight loss of disc space height without significant herniation.  No spinal canal or left foraminal narrowing.  Mild right foraminal narrowing related to uncovertebral osteophyte.    C4-C5: Loss of disc space height with broad-based disc osteophyte.  Disc osteophyte approximates the anterior aspect of the cord right of midline.  Artifact limits evaluation.  The may be trace indentation of the cord.  AP diameter of the thecal sac measures 6 mm in this region.  Neural foramen are not well evaluated secondary to artifact.    C5-C6: Loss of disc space height with broad-based disc osteophyte resulting in mild indentation on the anterior thecal sac approximating the anterior cord without definite cord flattening.  AP diameter of the thecal sac measures just over 8 mm.  Neural foramen are not well evaluated secondary to artifact.  Prominent uncovertebral osteophytes do however appear to be present.    C6-C7: Loss of disc space height with mild broad-based disc osteophyte resulting in mild indentation on the anterior thecal sac, but no cord flattening.  AP diameter of the thecal sac measures 8.5 mm.  Artifact limits evaluation of the neural foramen, but prominent bilateral uncovertebral osteophytes appear to be present.    C7-T1: Maintained disc space height with minimal protrusion at the left lateral recess resulting in no significant spinal canal narrowing.  Limited evaluation of the neural foramen secondary to artifact.  Impression: Suboptimal study secondary to artifact on portions of this exam.  Multilevel spondylosis is present extending from C3-C7 with individual levels detailed above.    Electronically signed  by: Deion Patel MD  Date:    01/10/2023  Time:    15:01    MRI Brain Without Contrast  Narrative: EXAMINATION:  MRI BRAIN WITHOUT CONTRAST    CLINICAL HISTORY:  Debility, generalized weakness and pain    COMPARISON:  MRI brain 02/14/2022    TECHNIQUE:  Multiplanar multisequence MR imaging performed of the brain without contrast    FINDINGS:  No detected diffusion signal changes to suggest recent ischemia.  No ventricular or basal cistern effacement.  No midline shift or mass effect.  Major intracranial flow voids appear intact.  Paranasal sinuses and mastoid air cells are clear.  Calvarial signal is intact.  No signal change to suggest recent or remote hemorrhage.  Prominent gliotic nonspecific white matter signal change in the subcortical and periventricular regions of bilateral cerebral hemispheres unchanged in extent and distribution compared to the prior study.  Corpus callosum, pituitary gland, and remaining midline structures are unremarkable.  Impression:   1. No acute intracranial abnormality identified.  2. Nonspecific gliotic white matter signal change within bilateral cerebral hemispheres similar in extent and distribution greater than expected for patient age.  This may be related to chronic small vessel ischemia.  Numerous chronic systemic illnesses can result in this appearance.  Demyelinating disease is also in the differential.    Electronically signed by: Deion Patel MD  Date:    01/10/2023  Time:    14:55     Significant Imaging: I have reviewed all pertinent imaging results/findings within the past 24 hours.      Assessment/Plan:      * Debility  Lower extremity weakness bilaterally now requiring aggressive OT and PT efforts to prevent further weakness and also regain strength necessary to carry out ADLs.   - From early bed bound state, patient has required multiple nursing staff in safely getting in and out of bed   - Original discharge plans with outpatient rehab will cause more harm to patient  at this time and case management working to find a placement at SNF with PT and OT efforts  1/9 continue efforts with PT and OT, awaiting placement  1/10 slow progress with PT and OT, will obtain MRI brain and spine to identify any concerning changes  1/11 MRI limitations to TL spine study secondary to hardware, brain and cervical scan noting no acute findings;   1. No acute intracranial abnormality identified.  2. Nonspecific gliotic white matter signal change within bilateral cerebral hemispheres similar in extent and distribution greater than expected for patient age. This may be related to chronic small vessel ischemia.  Numerous chronic systemic illnesses can result in this appearance. Demyelinating disease is also in the differential.  Multiple prolonged hospitalization, neurosurgical intervention, chronic inflammation, substance abuse all likely contributing to current state. Outpatient follow up with specialists have been interrupted with hospitalizations and inconclusive studies. Will follow up with neurology for evaluation and optimize current therapy.      Pulmonary hypertension  Found incidentally on echocardiogram with evidence of elevated right sided pressures and dilated heart chambers on the right side. Unclear the exact etiology or class of Pulm HTN but given her significant substance abuse Class 1 certainly is a possibility.   - Will refer to outpatient pulmonologist  - Will consider a cariology consult while inpatient to see if they think it would be beneficial to have a heart cath done while inpatient      Severe sepsis  This patient does have evidence of infective focus  My overall impression is sepsis. Vital signs were reviewed and noted in progress note.  Antibiotics given-   Antibiotics (From admission, onward)    Start     Stop Route Frequency Ordered    01/02/23 1515  meropenem (MERREM) 1 g in sodium chloride 0.9 % 100 mL IVPB (MB+)        See Hyperspace for full Linked Orders Report.     01/03 0714 IV Every 8 hours (non-standard times) 01/02/23 0923    12/23/22 0533  vancomycin (VANCOCIN) 1,000 mg injection        Note to Pharmacy: Created by cabinet override    12/23 1744   12/23/22 0533    12/23/22 0405  piperacillin-tazobactam (ZOSYN) 4.5 gram injection        Note to Pharmacy: Created by cabinet override    12/23 1614   12/23/22 0405        Cultures were taken-   Microbiology Results (last 7 days)     Procedure Component Value Units Date/Time    Blood culture [[id number]] Collected: 01/10/23 0621    Order Status: Sent Specimen: Blood Updated: 01/10/23 0621    Blood culture [[id number]] Collected: 01/10/23 0616    Order Status: Sent Specimen: Blood Updated: 01/10/23 0616        Latest lactate reviewed, they are-  No results for input(s): LACTATE in the last 72 hours.    Organ dysfunction indicated by Acute kidney injury, Encephalopathy  and Acute liver injury  Source- ?    Source control Achieved by- see orders  - Merrem day 7 of 7 (completed on 1/3/23)  - Blood cx x2 (1/2/23) final report no growth    1/7 resolved  1/10 overnight elevated temperature reading, no worsening overall pain. Follow up Blood cultures and imagining.       Murmur, cardiac  No evidence of vegetations.     Mild episode of recurrent major depressive disorder  Resume home meds.   - lyrica discontinued  - continue gabapentin 300 mg TID      Cannabis use disorder, moderate, dependence  As above.      Amphetamine use disorder, severe  Continue to  educate and support.      Spinal stenosis at L4-L5 level  Check xray of back due to pain, increase in scoliosis.   1/10 given proximal muscle weakness with slow improvement with daily PT and OT, will follow up MRI brain and spine    Substance use disorder  Cessation advised. Follow up consult psychiatry.     Chronic hepatitis C with cirrhosis  Labs noted, will require follow up outpatient with GI to start treatment.       Cirrhosis of liver without ascites  Labs noted,  stable.  AST   Date Value Ref Range Status   01/11/2023 45 (H) 10 - 40 U/L Final   01/11/2023 45 (H) 10 - 40 U/L Final   01/10/2023 47 (H) 10 - 40 U/L Final   01/10/2023 39 10 - 40 U/L Final   01/09/2023 38 10 - 40 U/L Final     ALT   Date Value Ref Range Status   01/11/2023 20 10 - 44 U/L Final   01/11/2023 20 10 - 44 U/L Final   01/10/2023 23 10 - 44 U/L Final   01/10/2023 19 10 - 44 U/L Final   01/09/2023 18 10 - 44 U/L Final        Alkaline Phosphatase   Date Value Ref Range Status   01/11/2023 131 55 - 135 U/L Final   01/11/2023 131 55 - 135 U/L Final   01/10/2023 123 55 - 135 U/L Final   01/10/2023 124 55 - 135 U/L Final   01/09/2023 121 55 - 135 U/L Final            Pancytopenia  Counts are stable at this time.  Two days of leukocyte count dropping 2.39 to 1.76 and 1.42.   Overnight febrile event >101F, patient however feels better compared to yesterday evening.   1/11: Morning, vital signs stable, no tachycardia, BP normotensive.   - f/u blood cultures, NGTD x 1 day  - resumed empiric antibiotics, merrem after two days of elevated temperature >101F and downtrending ANC (900/ul)        VTE Risk Mitigation (From admission, onward)         Ordered     IP VTE HIGH RISK PATIENT  Once         12/23/22 0647     Place sequential compression device  Until discontinued         12/23/22 0647                Discharge Planning   SHIRA: 1/4/2023     Code Status: Full Code   Is the patient medically ready for discharge?:     Reason for patient still in hospital (select all that apply): Patient trending condition, Consult recommendations, PT / OT recommendations and Pending disposition  Discharge Plan A: Skilled Nursing Facility   Discharge Delays: None known at this time              Dannielle Merino DO  Department of Hospital Medicine   [facility]

## 2023-01-11 NOTE — PROGRESS NOTES
"Patient reports she is "having a bad day with her pain" and she is unable to participate in treatment today   "

## 2023-01-11 NOTE — PT/OT/SLP PROGRESS
Physical Therapy      Patient Name:  Rachel Zazueta   MRN:  [id number]    Patient not seen today secondary to Patient fatigue, Pain. Will follow-up 1/12/23.

## 2023-01-11 NOTE — SUBJECTIVE & OBJECTIVE
Interval History: Patient seen and examined.     Review of Systems   Constitutional:  Negative for activity change, appetite change, chills, diaphoresis, fatigue and fever.   HENT:  Negative for sore throat.    Eyes:  Negative for pain.   Respiratory:  Negative for cough, choking, chest tightness and shortness of breath.    Cardiovascular:  Negative for chest pain, palpitations and leg swelling.   Gastrointestinal:  Negative for abdominal pain, constipation, diarrhea, nausea, rectal pain and vomiting.   Genitourinary:  Negative for dyspareunia, dysuria, flank pain, frequency, genital sores, hematuria and menstrual problem.   Musculoskeletal:  Positive for arthralgias, back pain, gait problem, myalgias and neck pain.   Skin:  Negative for pallor, rash and wound.   Neurological:  Positive for weakness. Negative for dizziness, tremors, speech difficulty and headaches.   Psychiatric/Behavioral:  Negative for agitation, behavioral problems, confusion, decreased concentration and dysphoric mood.    Objective:     Vital Signs (Most Recent):  Temp: 98.1 °F (36.7 °C) (01/11/23 0737)  Pulse: 103 (01/11/23 0737)  Resp: 20 (01/11/23 0737)  BP: (!) 120/58 (01/11/23 0737)  SpO2: 98 % (01/11/23 0737)   Vital Signs (24h Range):  Temp:  [98.1 °F (36.7 °C)-101.2 °F (38.4 °C)] 98.1 °F (36.7 °C)  Pulse:  [103-127] 103  Resp:  [17-20] 20  SpO2:  [98 %] 98 %  BP: (118-135)/(58-60) 120/58     Weight: 97.5 kg (214 lb 15.2 oz)  Body mass index is 33.67 kg/m².    Intake/Output Summary (Last 24 hours) at 1/11/2023 0914  Last data filed at 1/11/2023 0746  Gross per 24 hour   Intake 2480 ml   Output 400 ml   Net 2080 ml      Physical Exam  Vitals and nursing note reviewed.   Constitutional:       General: She is not in acute distress.     Appearance: She is obese.   HENT:      Head: Normocephalic and atraumatic.      Right Ear: External ear normal.      Left Ear: External ear normal.      Nose: Nose normal. No congestion or rhinorrhea.       Mouth/Throat:      Mouth: Mucous membranes are moist.      Pharynx: No oropharyngeal exudate.   Eyes:      General: No scleral icterus.     Extraocular Movements: Extraocular movements intact.      Conjunctiva/sclera: Conjunctivae normal.   Neck:      Vascular: No carotid bruit.      Comments: Limited ROM at baseline  Cardiovascular:      Rate and Rhythm: Normal rate and regular rhythm.      Heart sounds: Murmur heard.     No friction rub. No gallop.   Pulmonary:      Effort: No respiratory distress.      Breath sounds: No stridor. No wheezing, rhonchi or rales.   Chest:      Chest wall: No tenderness.   Abdominal:      General: There is no distension.      Palpations: Abdomen is soft. There is no mass.      Tenderness: There is no abdominal tenderness. There is no right CVA tenderness, left CVA tenderness or guarding.   Musculoskeletal:         General: No swelling, tenderness or deformity.      Cervical back: Normal range of motion and neck supple. No rigidity or tenderness.      Right lower leg: No edema.      Left lower leg: No edema.   Lymphadenopathy:      Cervical: No cervical adenopathy.   Skin:     General: Skin is dry.      Capillary Refill: Capillary refill takes less than 2 seconds.      Coloration: Skin is not jaundiced or pale.      Findings: No bruising.   Neurological:      Mental Status: She is oriented to person, place, and time. Mental status is at baseline.      Cranial Nerves: No cranial nerve deficit.      Sensory: Sensory deficit present.      Motor: Weakness (unable to lift legs against gravity, also unable to plant feet with hips and knees flexed bilaterally, no SI joint tenderness, tenderness over spinous processes at baseline) present.   Psychiatric:         Mood and Affect: Mood normal.         Behavior: Behavior normal.         Thought Content: Thought content normal.      Comments: Calm, cooperative     Significant Labs: All pertinent labs within the past 24 hours have been  reviewed.  Bilirubin:   Recent Labs   Lab 01/08/23  0529 01/09/23  0538 01/10/23  0546 01/10/23  2350 01/11/23  0523   BILIDIR  --   --   --   --  0.5*   BILITOT 0.9 1.0 1.0 0.9 0.9  0.9     Blood Culture:   Recent Labs   Lab 01/10/23  0616 01/10/23  0621   LABBLOO No growth to date No growth to date     BMP:   Recent Labs   Lab 01/11/23 0523     104     137   K 3.8  3.8     106   CO2 27  27   BUN 13  13   CREATININE 0.5  0.5   CALCIUM 7.7*  7.7*   MG 1.8     CBC:   Recent Labs   Lab 01/10/23  0546 01/10/23  2350 01/11/23  0524   WBC 1.42* 2.24* 2.34*   HGB 8.7* 9.0* 9.1*   HCT 27.2* 27.6* 28.2*   PLT 73* 88* 92*     CMP:   Recent Labs   Lab 01/10/23  0546 01/10/23  2350 01/11/23  0523    135* 137  137   K 3.8 4.0 3.8  3.8    105 106  106   CO2 27 26 27  27    148* 104  104   BUN 16 15 13  13   CREATININE 0.5 0.5 0.5  0.5   CALCIUM 8.0* 7.8* 7.7*  7.7*   PROT 7.2 7.3 7.3  7.3   ALBUMIN 1.8* 1.9* 1.8*  1.8*  1.8*   BILITOT 1.0 0.9 0.9  0.9   ALKPHOS 124 123 131  131   AST 39 47* 45*  45*   ALT 19 23 20  20   ANIONGAP 4* 4* 4*  4*     Lactic Acid:   Recent Labs   Lab 01/10/23  2350   LACTATE 2.0     Magnesium:   Recent Labs   Lab 01/10/23  0546 01/11/23  0523   MG 1.9 1.8     MRI Cervical Spine Without Contrast  Narrative: EXAMINATION:  MRI CERVICAL SPINE WITHOUT CONTRAST    CLINICAL HISTORY:  Generalized weakness    COMPARISON:  No priors available    TECHNIQUE:  Multiplanar multisequence MR imaging performed of the cervical spine without contrast    FINDINGS:  Imaged portions of the spinal cord demonstrate normal size and signal.  Vertebral bodies are normal in height and alignment.  Artifact present on portions of this exam.  Degenerative related endplate signal changes at C4 through C7.  Marginal osteophytes are present at these levels.    C2-C3: Disc normal in height without herniation.  No spinal canal or foraminal narrowing detected.    C3-C4:  Slight loss of disc space height without significant herniation.  No spinal canal or left foraminal narrowing.  Mild right foraminal narrowing related to uncovertebral osteophyte.    C4-C5: Loss of disc space height with broad-based disc osteophyte.  Disc osteophyte approximates the anterior aspect of the cord right of midline.  Artifact limits evaluation.  The may be trace indentation of the cord.  AP diameter of the thecal sac measures 6 mm in this region.  Neural foramen are not well evaluated secondary to artifact.    C5-C6: Loss of disc space height with broad-based disc osteophyte resulting in mild indentation on the anterior thecal sac approximating the anterior cord without definite cord flattening.  AP diameter of the thecal sac measures just over 8 mm.  Neural foramen are not well evaluated secondary to artifact.  Prominent uncovertebral osteophytes do however appear to be present.    C6-C7: Loss of disc space height with mild broad-based disc osteophyte resulting in mild indentation on the anterior thecal sac, but no cord flattening.  AP diameter of the thecal sac measures 8.5 mm.  Artifact limits evaluation of the neural foramen, but prominent bilateral uncovertebral osteophytes appear to be present.    C7-T1: Maintained disc space height with minimal protrusion at the left lateral recess resulting in no significant spinal canal narrowing.  Limited evaluation of the neural foramen secondary to artifact.  Impression: Suboptimal study secondary to artifact on portions of this exam.  Multilevel spondylosis is present extending from C3-C7 with individual levels detailed above.    Electronically signed by: Deion Patel MD  Date:    01/10/2023  Time:    15:01    MRI Brain Without Contrast  Narrative: EXAMINATION:  MRI BRAIN WITHOUT CONTRAST    CLINICAL HISTORY:  Debility, generalized weakness and pain    COMPARISON:  MRI brain 02/14/2022    TECHNIQUE:  Multiplanar multisequence MR imaging performed of the brain  without contrast    FINDINGS:  No detected diffusion signal changes to suggest recent ischemia.  No ventricular or basal cistern effacement.  No midline shift or mass effect.  Major intracranial flow voids appear intact.  Paranasal sinuses and mastoid air cells are clear.  Calvarial signal is intact.  No signal change to suggest recent or remote hemorrhage.  Prominent gliotic nonspecific white matter signal change in the subcortical and periventricular regions of bilateral cerebral hemispheres unchanged in extent and distribution compared to the prior study.  Corpus callosum, pituitary gland, and remaining midline structures are unremarkable.  Impression:   1. No acute intracranial abnormality identified.  2. Nonspecific gliotic white matter signal change within bilateral cerebral hemispheres similar in extent and distribution greater than expected for patient age.  This may be related to chronic small vessel ischemia.  Numerous chronic systemic illnesses can result in this appearance.  Demyelinating disease is also in the differential.    Electronically signed by: Deion Patel MD  Date:    01/10/2023  Time:    14:55     Significant Imaging: I have reviewed all pertinent imaging results/findings within the past 24 hours.

## 2023-01-11 NOTE — PLAN OF CARE
The patient was approved for [facility] facility located in [place]; however, there is a possibility that the patient will be discharge to another health care facility for higher level of care. Will continue to monitor.

## 2023-01-12 PROBLEM — F32.A ANXIETY AND DEPRESSION: Status: ACTIVE | Noted: 2023-01-12

## 2023-01-12 PROBLEM — F41.9 ANXIETY AND DEPRESSION: Status: ACTIVE | Noted: 2023-01-12

## 2023-01-12 LAB
ALBUMIN SERPL BCP-MCNC: 1.8 G/DL (ref 3.5–5.2)
ALP SERPL-CCNC: 132 U/L (ref 55–135)
ALT SERPL W/O P-5'-P-CCNC: 23 U/L (ref 10–44)
ANION GAP SERPL CALC-SCNC: 4 MMOL/L (ref 8–16)
AST SERPL-CCNC: 46 U/L (ref 10–40)
BASOPHILS # BLD AUTO: 0.01 K/UL (ref 0–0.2)
BASOPHILS NFR BLD: 0.4 % (ref 0–1.9)
BILIRUB SERPL-MCNC: 0.9 MG/DL (ref 0.1–1)
BUN SERPL-MCNC: 13 MG/DL (ref 6–20)
CALCIUM SERPL-MCNC: 7.8 MG/DL (ref 8.7–10.5)
CHLORIDE SERPL-SCNC: 106 MMOL/L (ref 95–110)
CO2 SERPL-SCNC: 27 MMOL/L (ref 23–29)
CREAT SERPL-MCNC: 0.5 MG/DL (ref 0.5–1.4)
DIFFERENTIAL METHOD: ABNORMAL
EOSINOPHIL # BLD AUTO: 0.1 K/UL (ref 0–0.5)
EOSINOPHIL NFR BLD: 3.1 % (ref 0–8)
ERYTHROCYTE [DISTWIDTH] IN BLOOD BY AUTOMATED COUNT: 16.9 % (ref 11.5–14.5)
EST. GFR  (NO RACE VARIABLE): >60 ML/MIN/1.73 M^2
GLUCOSE SERPL-MCNC: 107 MG/DL (ref 70–110)
HCT VFR BLD AUTO: 29.8 % (ref 37–48.5)
HGB BLD-MCNC: 9.5 G/DL (ref 12–16)
IMM GRANULOCYTES # BLD AUTO: 0.01 K/UL (ref 0–0.04)
IMM GRANULOCYTES NFR BLD AUTO: 0.4 % (ref 0–0.5)
LYMPHOCYTES # BLD AUTO: 0.5 K/UL (ref 1–4.8)
LYMPHOCYTES NFR BLD: 22.8 % (ref 18–48)
MAGNESIUM SERPL-MCNC: 2 MG/DL (ref 1.6–2.6)
MCH RBC QN AUTO: 27.7 PG (ref 27–31)
MCHC RBC AUTO-ENTMCNC: 31.9 G/DL (ref 32–36)
MCV RBC AUTO: 87 FL (ref 82–98)
MONOCYTES # BLD AUTO: 0.4 K/UL (ref 0.3–1)
MONOCYTES NFR BLD: 15.4 % (ref 4–15)
NEUTROPHILS # BLD AUTO: 1.3 K/UL (ref 1.8–7.7)
NEUTROPHILS NFR BLD: 57.9 % (ref 38–73)
NRBC BLD-RTO: 0 /100 WBC
PLATELET # BLD AUTO: 96 K/UL (ref 150–450)
PMV BLD AUTO: 9.9 FL (ref 9.2–12.9)
POTASSIUM SERPL-SCNC: 3.9 MMOL/L (ref 3.5–5.1)
PROT SERPL-MCNC: 7.5 G/DL (ref 6–8.4)
RBC # BLD AUTO: 3.43 M/UL (ref 4–5.4)
SODIUM SERPL-SCNC: 137 MMOL/L (ref 136–145)
WBC # BLD AUTO: 2.28 K/UL (ref 3.9–12.7)

## 2023-01-12 PROCEDURE — 25000003 PHARM REV CODE 250: Performed by: PSYCHIATRY & NEUROLOGY

## 2023-01-12 PROCEDURE — 36410 VNPNXR 3YR/> PHY/QHP DX/THER: CPT

## 2023-01-12 PROCEDURE — 90833 PSYTX W PT W E/M 30 MIN: CPT | Mod: SA,HB,, | Performed by: PSYCHIATRY & NEUROLOGY

## 2023-01-12 PROCEDURE — 63600175 PHARM REV CODE 636 W HCPCS: Performed by: STUDENT IN AN ORGANIZED HEALTH CARE EDUCATION/TRAINING PROGRAM

## 2023-01-12 PROCEDURE — 99900035 HC TECH TIME PER 15 MIN (STAT)

## 2023-01-12 PROCEDURE — 76937 US GUIDE VASCULAR ACCESS: CPT

## 2023-01-12 PROCEDURE — 97535 SELF CARE MNGMENT TRAINING: CPT

## 2023-01-12 PROCEDURE — 90833 PR PSYCHOTHERAPY W/PATIENT W/E&M, 30 MIN (ADD ON): ICD-10-PCS | Mod: SA,HB,, | Performed by: PSYCHIATRY & NEUROLOGY

## 2023-01-12 PROCEDURE — 36415 COLL VENOUS BLD VENIPUNCTURE: CPT | Performed by: INTERNAL MEDICINE

## 2023-01-12 PROCEDURE — 80053 COMPREHEN METABOLIC PANEL: CPT | Performed by: INTERNAL MEDICINE

## 2023-01-12 PROCEDURE — 97530 THERAPEUTIC ACTIVITIES: CPT

## 2023-01-12 PROCEDURE — 25000003 PHARM REV CODE 250: Performed by: INTERNAL MEDICINE

## 2023-01-12 PROCEDURE — 99232 SBSQ HOSP IP/OBS MODERATE 35: CPT | Mod: SA,HB,, | Performed by: PSYCHIATRY & NEUROLOGY

## 2023-01-12 PROCEDURE — 25000003 PHARM REV CODE 250: Performed by: STUDENT IN AN ORGANIZED HEALTH CARE EDUCATION/TRAINING PROGRAM

## 2023-01-12 PROCEDURE — 99233 PR SUBSEQUENT HOSPITAL CARE,LEVL III: ICD-10-PCS | Mod: ,,, | Performed by: STUDENT IN AN ORGANIZED HEALTH CARE EDUCATION/TRAINING PROGRAM

## 2023-01-12 PROCEDURE — 27000207 HC ISOLATION

## 2023-01-12 PROCEDURE — 85025 COMPLETE CBC W/AUTO DIFF WBC: CPT | Performed by: INTERNAL MEDICINE

## 2023-01-12 PROCEDURE — C1751 CATH, INF, PER/CENT/MIDLINE: HCPCS

## 2023-01-12 PROCEDURE — 11000001 HC ACUTE MED/SURG PRIVATE ROOM

## 2023-01-12 PROCEDURE — 99232 PR SUBSEQUENT HOSPITAL CARE,LEVL II: ICD-10-PCS | Mod: SA,HB,, | Performed by: PSYCHIATRY & NEUROLOGY

## 2023-01-12 PROCEDURE — 99233 SBSQ HOSP IP/OBS HIGH 50: CPT | Mod: ,,, | Performed by: STUDENT IN AN ORGANIZED HEALTH CARE EDUCATION/TRAINING PROGRAM

## 2023-01-12 PROCEDURE — 94761 N-INVAS EAR/PLS OXIMETRY MLT: CPT

## 2023-01-12 PROCEDURE — 83735 ASSAY OF MAGNESIUM: CPT | Performed by: INTERNAL MEDICINE

## 2023-01-12 PROCEDURE — 25000003 PHARM REV CODE 250: Performed by: EMERGENCY MEDICINE

## 2023-01-12 PROCEDURE — 99900031 HC PATIENT EDUCATION (STAT)

## 2023-01-12 RX ORDER — GABAPENTIN 400 MG/1
400 CAPSULE ORAL 3 TIMES DAILY
Status: DISCONTINUED | OUTPATIENT
Start: 2023-01-12 | End: 2023-01-17

## 2023-01-12 RX ORDER — SODIUM CHLORIDE 9 MG/ML
INJECTION, SOLUTION INTRAVENOUS CONTINUOUS
Status: ACTIVE | OUTPATIENT
Start: 2023-01-12 | End: 2023-01-12

## 2023-01-12 RX ORDER — BISACODYL 10 MG
10 SUPPOSITORY, RECTAL RECTAL DAILY
Status: DISPENSED | OUTPATIENT
Start: 2023-01-12 | End: 2023-01-15

## 2023-01-12 RX ORDER — BUPROPION HYDROCHLORIDE 150 MG/1
300 TABLET ORAL DAILY
Status: DISCONTINUED | OUTPATIENT
Start: 2023-01-13 | End: 2023-01-19 | Stop reason: HOSPADM

## 2023-01-12 RX ADMIN — HYDROCODONE BITARTRATE AND ACETAMINOPHEN 1 TABLET: 5; 325 TABLET ORAL at 11:01

## 2023-01-12 RX ADMIN — ALPRAZOLAM 0.25 MG: 0.25 TABLET ORAL at 11:01

## 2023-01-12 RX ADMIN — MAGNESIUM OXIDE TAB 400 MG (241.3 MG ELEMENTAL MG) 400 MG: 400 (241.3 MG) TAB at 10:01

## 2023-01-12 RX ADMIN — POLYETHYLENE GLYCOL (3350) 17 G: 17 POWDER, FOR SOLUTION ORAL at 10:01

## 2023-01-12 RX ADMIN — LACTULOSE 20 G: 20 SOLUTION ORAL at 03:01

## 2023-01-12 RX ADMIN — SODIUM CHLORIDE: 9 INJECTION, SOLUTION INTRAVENOUS at 10:01

## 2023-01-12 RX ADMIN — POTASSIUM CHLORIDE 20 MEQ: 1500 TABLET, EXTENDED RELEASE ORAL at 10:01

## 2023-01-12 RX ADMIN — GABAPENTIN 300 MG: 300 CAPSULE ORAL at 03:01

## 2023-01-12 RX ADMIN — HYDROCODONE BITARTRATE AND ACETAMINOPHEN 1 TABLET: 5; 325 TABLET ORAL at 05:01

## 2023-01-12 RX ADMIN — METHOCARBAMOL TABLETS 500 MG: 500 TABLET, COATED ORAL at 04:01

## 2023-01-12 RX ADMIN — MEROPENEM 1 G: 1 INJECTION, POWDER, FOR SOLUTION INTRAVENOUS at 12:01

## 2023-01-12 RX ADMIN — METHOCARBAMOL TABLETS 500 MG: 500 TABLET, COATED ORAL at 12:01

## 2023-01-12 RX ADMIN — METHOCARBAMOL TABLETS 500 MG: 500 TABLET, COATED ORAL at 10:01

## 2023-01-12 RX ADMIN — MEROPENEM 1 G: 1 INJECTION, POWDER, FOR SOLUTION INTRAVENOUS at 05:01

## 2023-01-12 RX ADMIN — BUPROPION HYDROCHLORIDE 100 MG: 100 TABLET, FILM COATED ORAL at 10:01

## 2023-01-12 RX ADMIN — HYDROCODONE BITARTRATE AND ACETAMINOPHEN 1 TABLET: 5; 325 TABLET ORAL at 01:01

## 2023-01-12 RX ADMIN — IBUPROFEN 400 MG: 400 TABLET, FILM COATED ORAL at 08:01

## 2023-01-12 RX ADMIN — GABAPENTIN 400 MG: 400 CAPSULE ORAL at 08:01

## 2023-01-12 RX ADMIN — MEROPENEM 1 G: 1 INJECTION, POWDER, FOR SOLUTION INTRAVENOUS at 09:01

## 2023-01-12 RX ADMIN — LACTULOSE 20 G: 20 SOLUTION ORAL at 10:01

## 2023-01-12 RX ADMIN — LACTULOSE 20 G: 20 SOLUTION ORAL at 08:01

## 2023-01-12 RX ADMIN — BISACODYL 10 MG: 10 SUPPOSITORY RECTAL at 10:01

## 2023-01-12 RX ADMIN — GABAPENTIN 300 MG: 300 CAPSULE ORAL at 10:01

## 2023-01-12 RX ADMIN — METHOCARBAMOL TABLETS 500 MG: 500 TABLET, COATED ORAL at 08:01

## 2023-01-12 RX ADMIN — PANTOPRAZOLE SODIUM 40 MG: 40 TABLET, DELAYED RELEASE ORAL at 10:01

## 2023-01-12 NOTE — PLAN OF CARE
Problem: Occupational Therapy  Goal: Occupational Therapy Goal  Description: Goals to be met by: 01/20/22     Patient will increase functional independence with ADLs by performing:    UE Dressing with Set-up Assistance.  LE Dressing with Minimal Assistance.  Grooming while seated with Modified [place].  Toileting from toilet with Supervision for hygiene and clothing management.   Bathing from  shower chair/bench with Minimal Assistance.  Step transfer with Minimal Assistance  Toilet transfer to toilet with Minimal Assistance.  Increased functional strength to 4/5 for bilateral UE's.    Outcome: Ongoing, Progressing

## 2023-01-12 NOTE — PROGRESS NOTES
"[facility] Medicine  Progress Note    Patient Name: Rachel Zazueta  MRN: [id number]  Patient Class: IP- Inpatient   Admission Date: 12/23/2022  Length of Stay: 20 days  Attending Physician: Dannielle Merino DO  Primary Care Provider: Dannielle Merino DO        Subjective:     Principal Problem:Debility        HPI:  ER HPI:  42-year-old female with a history of anemia, anxiety, recurrent cellulitis, hepatitis C, IVDU, liver cirrhosis, cholecystectomy, kidney stones, lumbar fusion, shoulder surgery, chronic back pain comes in c/o generalized weakness, fatigue, and bilateral flank pain.  She reports that this has been going on intermittently for several days and was diagnosed with a UTI recently.  No fever.  No abdominal pain or vomiting or diarrhea.     IM HPI:  Patient is well known to our service.  Patient has a history of IV drug abuse and unfortunately has relapsed.  Patient has a history of a epidural abscess requiring a large lumbar surgery with multiple rehabilitative stays and therapy.  The patient was progressing to ambulation and recently has declined.  Patient admits to starting drugs including IV drug use again.  Patient presented to the ED with the above symptoms and she has a noticeable cardiac murmur today that we are getting a stat echocardiogram.  Patient's been started on antibiotics fluid resuscitated and seems to have sepsis.  Patient's urinary studies were abnormal but not overwhelming.      Overview/Hospital Course:  12/24/22 CG: Patient remains in the ICU today. Has a lot of muscle spasms and low back pain. Echocardiogram ordered yesterday and completed; significant for pulm HTN but without vegetations.   12/25/22 CG:  Overnight she was having a lot of significant discomfort and pain.  We placed her on Precedex and this has helped significantly with her body aches, her irritability and muscle spasms."  12/26/22 FM:  Patient required Precedex for agitation and mood " changes.  Patient is calm and cooperative this morning.  Patient appears to have E coli sepsis so will taper antibiotics.  Patient's echocardiogram showed no vegetation and E coli would be low risk for metastatic infection.  Will transfer the patient to the floor once her Precedex discontinue we counseled her again today on the importance of avoiding substances and illicit drugs.  This continues to be setback for her.  12/27/22 WC:  Patient overall remains stable and is slowly improving.  Urine culture has revealed Candida albicans in addition to blood culture revealing E coli.  12/28/22 WC:  Patient resting comfortably this morning.  Pharm ID on case for treatment duration recs.    12/29/22 WC:  no acute events overnight.  Back pain at this time.   12/20/22:  no acute events continue IV ABX for esbl  12/31/22 CG: stable, continuing antibiotics.   1/1/2 CG: stable, continuing antibiotics. Has not had a bowel movement in two weeks. Will give lactulose.    01/02/2023: Patient did have a bowel movement yesterday.  Completing last day of antimicrobial therapy anticipated discharge tomorrow.  1/3/2023: Improving BM with lactulose. Lower extremity weakness requiring aggressive PT and OT efforts. Patient understands she needs to be an active participant. Encouraged inbed physical exercises without assistance with therapists. Stable Hg/HCT.   1/4/2023: Lactulose every other day for regular bowel movement. PT and OT recommendations with continue efforts to help with lower extremity weakness which likely precipitated from minimal participation from patient over the past week to remain physically active. Hg/HCT remains stable. Last blood Cx x2 (12/28) NGTD x5 days. Patient agrees moving forward lower extremity strengthening will have to take place with her participation and motivation. Will discharge with continue PT and OT efforts outpatient. She acknowledges outpatient follow up with GI/hepatologist, hematologist and  neurologist required to address all other chronic medication conditions.   1/5/2023: Overnight report from nursing that patient exhibited weakness in lower extremities and in upper body strength to support self to safely transfer from bed to commode or even bed to wheelchair. CM involved in placement to SNF. Continue with PT and OT. All other medical condition addressed during hospitalization remains stable.  1/6/2023: CM working on SNF placement. Patient expresses continue effort on her part to regain her strength, reassured her that she has been able to participate following the leads of PT and OT and so a new injury is less likely at this time. While small, she endorsed sitting up at bedside with legs dangle.    1/7/23 ND patient feeling well this morning no complaints will continue therapy services and rehab placement  1/8/23 ND pt reports some back pain will repeat x-rays to make sure everything looks okay from previous surgery.  Otherwise she is doing well  1/9/23 Repeat xray showing increased kyphosis over TL junction. Today, pain associated with massaging feet bilaterally with no worsening of numbness and tingling. No reported concerns with PT and OT efforts. Awaiting placement.  1/10/23 Overnight elevated temperature >101F, no further discomfort at this time and on morning exam denies further symptoms and worsening pain over joints and spine. PT and OT efforts moving forward, but still has significant muscle weakness proximally and over her core. White cells reduced, CRP elevated. Will obtain MRI scan brain and spine to follow up with worsening scoliosis, identify occult findings.    1/11/23 Second evening with elevated temp >101F with chills and tachycardia. Sepsis precautions taken with labs and cultures. No obvious infectious source, but empirically started merrem for neutropenic fever precautions. Patient this morning endorses good appetite and hopeful in being able to sit up longer today. Yesterday,  she was able to stay seated 10 minutes from 3 minutes, the day prior. Pancytopenia profile back to patient's baseline. Overall pain stable and no reported worsening. Lower extremity weakness with loss of flexion at knees and hips against gravity still prominent. Will appreciate neurology consult. Continue PT and OT.   1/12/23 Neurology consult for paraparesis, differential diagnoses include demyelinating disease, ischemic changes (MRI white matter changes), spinal cord lesions (pt with history of past epidural abscesses), compressive myelopathy.   - MRI brain with white matter changes that are concerning for demyelinating disease vs ischemic change  -Thoracic spine radiographs, three views, demonstrate multilevel thoracolumbar fusion with local kyphosis at T9-10, increased when compared to scoliosis series of 04/14/2022, measuring approximately 40 degree on today's exam and 30° on prior exam  Current facility with CT imagining limitations to further determine nature of spinal compression likely affecting bilateral lower extremity, hyperreflexia and reported altered sensation to feet. Pulses intact bilaterally.   Currently awaiting diversion at [facility] to be lifted for transfer to neurosurgery service with further neurology work up.   No fever overnight. Blood cultures NGTD x 2 days. Will continue merrem for another 24 hours, then discontinue if no elevated temperature reading.       Interval History: Patient seen and eaxmined.     Review of Systems   Constitutional:  Negative for activity change, appetite change, chills, diaphoresis, fatigue and fever.   HENT:  Negative for sore throat.    Eyes:  Negative for pain.   Respiratory:  Negative for cough, choking, chest tightness and shortness of breath.    Cardiovascular:  Negative for chest pain, palpitations and leg swelling.   Gastrointestinal:  Positive for abdominal pain and constipation. Negative for abdominal distention, diarrhea, nausea, rectal pain and  vomiting.   Genitourinary:  Negative for dyspareunia, dysuria, flank pain, frequency, genital sores, hematuria and menstrual problem.   Musculoskeletal:  Positive for arthralgias, back pain, gait problem, myalgias and neck pain. Negative for neck stiffness.   Skin:  Negative for pallor, rash and wound.   Neurological:  Positive for weakness. Negative for dizziness, tremors, speech difficulty and headaches.   Psychiatric/Behavioral:  Negative for agitation, behavioral problems, confusion, decreased concentration and dysphoric mood.    Objective:     Vital Signs (Most Recent):  Temp: 98 °F (36.7 °C) (01/12/23 0751)  Pulse: 98 (01/12/23 0751)  Resp: 20 (01/12/23 0751)  BP: 121/65 (01/12/23 0751)  SpO2: 100 % (01/12/23 0751) Vital Signs (24h Range):  Temp:  [98 °F (36.7 °C)-98.6 °F (37 °C)] 98 °F (36.7 °C)  Pulse:  [] 98  Resp:  [18-20] 20  SpO2:  [95 %-100 %] 100 %  BP: (115-127)/(59-66) 121/65     Weight: 97.1 kg (214 lb)  Body mass index is 33.52 kg/m².    Intake/Output Summary (Last 24 hours) at 1/12/2023 1022  Last data filed at 1/12/2023 0645  Gross per 24 hour   Intake 1320 ml   Output 1900 ml   Net -580 ml      Physical Exam  Vitals and nursing note reviewed.   Constitutional:       General: She is not in acute distress.     Appearance: She is obese.   HENT:      Head: Normocephalic and atraumatic.      Right Ear: External ear normal.      Left Ear: External ear normal.      Nose: Nose normal. No congestion or rhinorrhea.      Mouth/Throat:      Mouth: Mucous membranes are dry.      Pharynx: No oropharyngeal exudate.   Eyes:      General: No scleral icterus.     Extraocular Movements: Extraocular movements intact.      Conjunctiva/sclera: Conjunctivae normal.   Neck:      Vascular: No carotid bruit.      Comments: Limited ROM at baseline  Cardiovascular:      Rate and Rhythm: Normal rate and regular rhythm.      Heart sounds: Murmur heard.     No friction rub. No gallop.   Pulmonary:      Effort: No  respiratory distress.      Breath sounds: No stridor. No wheezing, rhonchi or rales.   Chest:      Chest wall: No tenderness.   Abdominal:      General: There is no distension.      Palpations: Abdomen is soft. There is no mass.      Tenderness: There is no abdominal tenderness. There is no right CVA tenderness, left CVA tenderness or guarding.   Musculoskeletal:         General: No swelling, tenderness or deformity.      Cervical back: Normal range of motion and neck supple. No rigidity or tenderness.      Right lower leg: No edema.      Left lower leg: No edema.   Lymphadenopathy:      Cervical: No cervical adenopathy.   Skin:     General: Skin is dry.      Capillary Refill: Capillary refill takes less than 2 seconds.      Coloration: Skin is not jaundiced or pale.      Findings: No bruising or rash.   Neurological:      Mental Status: She is oriented to person, place, and time. Mental status is at baseline.      Cranial Nerves: No cranial nerve deficit.      Sensory: Sensory deficit present.      Motor: Weakness (unable to lift legs against gravity, also unable to plant feet with hips and knees flexed bilaterally, no SI joint tenderness, tenderness over spinous processes at baseline) present.      Comments: Sensation diminished in bilateral legs    Psychiatric:         Mood and Affect: Mood normal.         Behavior: Behavior normal.         Thought Content: Thought content normal.      Comments: Calm, cooperative, depression with uncertainty of current health status and her ability to fully recover     Significant Labs: All pertinent labs within the past 24 hours have been reviewed.  Bilirubin:   Recent Labs   Lab 01/09/23  0538 01/10/23  0546 01/10/23  2350 01/11/23  0523 01/12/23  0523   BILIDIR  --   --   --  0.5*  --    BILITOT 1.0 1.0 0.9 0.9  0.9 0.9     Blood Culture:   Recent Labs   Lab 01/10/23  2344 01/10/23  2348   LABBLOO No Growth to date  No Growth to date No Growth to date  No Growth to date      BMP:   Recent Labs   Lab 01/12/23 0523         K 3.9      CO2 27   BUN 13   CREATININE 0.5   CALCIUM 7.8*   MG 2.0     CBC:   Recent Labs   Lab 01/10/23  2350 01/11/23  0524 01/12/23 0523   WBC 2.24* 2.34* 2.28*   HGB 9.0* 9.1* 9.5*   HCT 27.6* 28.2* 29.8*   PLT 88* 92* 96*     CMP:   Recent Labs   Lab 01/10/23  2350 01/11/23  0523 01/12/23  0523   * 137  137 137   K 4.0 3.8  3.8 3.9    106  106 106   CO2 26 27  27 27   * 104  104 107   BUN 15 13  13 13   CREATININE 0.5 0.5  0.5 0.5   CALCIUM 7.8* 7.7*  7.7* 7.8*   PROT 7.3 7.3  7.3 7.5   ALBUMIN 1.9* 1.8*  1.8*  1.8* 1.8*   BILITOT 0.9 0.9  0.9 0.9   ALKPHOS 123 131  131 132   AST 47* 45*  45* 46*   ALT 23 20  20 23   ANIONGAP 4* 4*  4* 4*     Significant Imaging: I have reviewed all pertinent imaging results/findings within the past 24 hours.      Assessment/Plan:      * Debility  Lower extremity weakness bilaterally now requiring aggressive OT and PT efforts to prevent further weakness and also regain strength necessary to carry out ADLs.   - From early bed bound state, patient has required multiple nursing staff in safely getting in and out of bed   - Original discharge plans with outpatient rehab will cause more harm to patient at this time and case management working to find a placement at SNF with PT and OT efforts  1/9 continue efforts with PT and OT, awaiting placement  1/10 slow progress with PT and OT, will obtain MRI brain and spine to identify any concerning changes  1/11 MRI limitations to TL spine study secondary to hardware, brain and cervical scan noting no acute findings;   1. No acute intracranial abnormality identified.  2. Nonspecific gliotic white matter signal change within bilateral cerebral hemispheres similar in extent and distribution greater than expected for patient age. This may be related to chronic small vessel ischemia.  Numerous chronic systemic illnesses can result in this  appearance. Demyelinating disease is also in the differential.  Multiple prolonged hospitalization, neurosurgical intervention, chronic inflammation, substance abuse all likely contributing to current state. Outpatient follow up with specialists have been interrupted with hospitalizations and inconclusive studies. Will follow up with neurology for evaluation and optimize current therapy.      Anxiety and depression  Patient has recurrent depression which is moderate and is currently controlled. Will Continue anti-depressant medications. We will consult psychiatry at this time. Patient does not display psychosis at this time. Continue to monitor closely and adjust plan of care as needed.        Pulmonary hypertension  Found incidentally on echocardiogram with evidence of elevated right sided pressures and dilated heart chambers on the right side. Unclear the exact etiology or class of Pulm HTN but given her significant substance abuse Class 1 certainly is a possibility.   - Will refer to outpatient pulmonologist  - Will consider a cariology consult while inpatient to see if they think it would be beneficial to have a heart cath done while inpatient      Severe sepsis  This patient does have evidence of infective focus  My overall impression is sepsis. Vital signs were reviewed and noted in progress note.  Antibiotics given-   Antibiotics (From admission, onward)    Start     Stop Route Frequency Ordered    01/02/23 1515  meropenem (MERREM) 1 g in sodium chloride 0.9 % 100 mL IVPB (MB+)        See Hyperspace for full Linked Orders Report.    01/03 0714 IV Every 8 hours (non-standard times) 01/02/23 0923    12/23/22 0533  vancomycin (VANCOCIN) 1,000 mg injection        Note to Pharmacy: Created by cabinet override    12/23 1744   12/23/22 0533    12/23/22 0405  piperacillin-tazobactam (ZOSYN) 4.5 gram injection        Note to Pharmacy: Created by cabinet override    12/23 1614   12/23/22 0405        Cultures were  taken-   Microbiology Results (last 7 days)     Procedure Component Value Units Date/Time    Blood culture [[id number]] Collected: 01/10/23 0621    Order Status: Sent Specimen: Blood Updated: 01/10/23 0621    Blood culture [[id number]] Collected: 01/10/23 0616    Order Status: Sent Specimen: Blood Updated: 01/10/23 0616        Latest lactate reviewed, they are-  No results for input(s): LACTATE in the last 72 hours.    Organ dysfunction indicated by Acute kidney injury, Encephalopathy  and Acute liver injury  Source- ?    Source control Achieved by- see orders  - Merrem day 7 of 7 (completed on 1/3/23)  - Blood cx x2 (1/2/23) final report no growth    1/7 resolved  1/10 overnight elevated temperature reading, no worsening overall pain. Follow up Blood cultures and imagining.       Murmur, cardiac  No evidence of vegetations.     Mild episode of recurrent major depressive disorder  Resume home meds.   - lyrica discontinued  - continue gabapentin 300 mg TID      Cannabis use disorder, moderate, dependence  As above.      Amphetamine use disorder, severe  Continue to  educate and support.      Spinal stenosis at L4-L5 level  Check xray of back due to pain, increase in scoliosis.   1/10 given proximal muscle weakness with slow improvement with daily PT and OT, will follow up MRI brain and spine    Substance use disorder  Cessation advised. Follow up consult psychiatry.     Chronic hepatitis C with cirrhosis  Labs noted, will require follow up outpatient with GI to start treatment.       Cirrhosis of liver without ascites  Labs noted, stable.  AST   Date Value Ref Range Status   01/11/2023 45 (H) 10 - 40 U/L Final   01/11/2023 45 (H) 10 - 40 U/L Final   01/10/2023 47 (H) 10 - 40 U/L Final   01/10/2023 39 10 - 40 U/L Final   01/09/2023 38 10 - 40 U/L Final     ALT   Date Value Ref Range Status   01/11/2023 20 10 - 44 U/L Final   01/11/2023 20 10 - 44 U/L Final   01/10/2023 23 10 - 44 U/L Final   01/10/2023 19 10 - 44  U/L Final   01/09/2023 18 10 - 44 U/L Final        Alkaline Phosphatase   Date Value Ref Range Status   01/11/2023 131 55 - 135 U/L Final   01/11/2023 131 55 - 135 U/L Final   01/10/2023 123 55 - 135 U/L Final   01/10/2023 124 55 - 135 U/L Final   01/09/2023 121 55 - 135 U/L Final            Pancytopenia  Counts are stable at this time.  Two days of leukocyte count dropping 2.39 to 1.76 and 1.42.   Overnight febrile event >101F, patient however feels better compared to yesterday evening.   1/11: Morning, vital signs stable, no tachycardia, BP normotensive.   - f/u blood cultures, NGTD x 1 day  - resumed empiric antibiotics, merrem after two days of elevated temperature >101F and downtrending ANC (900/ul)        VTE Risk Mitigation (From admission, onward)         Ordered     IP VTE HIGH RISK PATIENT  Once         12/23/22 0647     Place sequential compression device  Until discontinued         12/23/22 0647              Discharge Planning   SHIRA: 1/4/2023     Code Status: Full Code   Is the patient medically ready for discharge?:     Reason for patient still in hospital (select all that apply): Treatment, Consult recommendations and Pending disposition  Discharge Plan A: Skilled Nursing Facility   Discharge Delays: None known at this time      Dannielle Merino DO  Department of Hospital Medicine   [facility]

## 2023-01-12 NOTE — PT/OT/SLP PROGRESS
Occupational Therapy   Treatment    Name: Rachel Zauzeta  MRN: [id number]  Admitting Diagnosis:  Debility       Recommendations:     Discharge Recommendations: other (see comments) (TBD)  Discharge Equipment Recommendations:  other (see comments) (TBD)  Barriers to discharge:  Other (Comment) (Medical and functional status)    Assessment:     Rachel Zazueta is a 42 y.o. female with a medical diagnosis of Debility. Performance deficits affecting function are weakness, impaired endurance, impaired self care skills, impaired functional mobility, impaired balance, decreased upper extremity function, decreased lower extremity function, decreased safety awareness, pain, decreased ROM.     Rehab Prognosis:  Fair; patient would benefit from acute skilled OT services to address these deficits and reach maximum level of function.       Plan:     Patient to be seen 5 x/week to address the above listed problems via self-care/home management, therapeutic activities, therapeutic exercises  Plan of Care Expires: 01/20/23  Plan of Care Reviewed with: patient    Subjective     Pain/Comfort:  Pain Rating 1: 8/10  Location - Side 1: Bilateral  Location - Orientation 1: generalized  Location 1: rib(s)  Pain Addressed 1: Reposition, Cessation of Activity, Nurse notified, Pre-medicate for activity  Pain Rating Post-Intervention 1: 8/10    Objective:     Communicated with: nurse prior to session.  Patient found supine with peripheral IV, PureWick upon OT entry to room.    General Precautions: Standard, contact, fall    Orthopedic Precautions:N/A  Braces: N/A  Respiratory Status: Room air     Occupational Performance:     Bed Mobility:    Patient completed Rolling/Turning to Left with  minimum assistance  Patient completed Rolling/Turning to Right with minimum assistance  Patient completed Scooting/Bridging with minimum assistance  Patient completed Supine to Sit with moderate assistance  Patient completed Sit to Supine with moderate  assistance     Functional Mobility/Transfers:  Pt refused standing activity on this date.  Functional Mobility: Pt unable to ambulate at this time.    Activities of Daily Living:  Toileting: total assistance        [facility] 6 Click ADL:      Treatment & Education:  Pt was cooperative with minimal verbal encouragement while exhibiting mostly positive affect. She performed bed mobility requiring min to mod assist secondary to some lifting assist and stabilization of trunk with some assist with (L) LE off EOB during supine to sit transition, steadying assist of trunk when scooting to EOB, and lifting assist of bilateral LE's during sit to supine transition with min-mod verbal and tactile cueing for technique to reduce pain and facilitate improved performance. Pt then participated in therapeutic activity addressing functional reaching, gross motor coordination, trunk control, static / dynamic sitting balance, and energy for task / endurance challenging her to reach in multiple planes utilizing bilateral Ue's requiring mod verbal and tactile cueing to facilitate proper weight shifting and sitting posture tolerating 8 min at EOB unsupported. Also, she participated in ADL retraining regarding toileting emphasizing use of compensatory strategies providing extra time requiring total assist secondary to much assistance with clothing management and perineal hygiene.    Patient left supine with all lines intact, call button in reach, and nurse notified    GOALS:   Multidisciplinary Problems       Occupational Therapy Goals          Problem: Occupational Therapy    Goal Priority Disciplines Outcome Interventions   Occupational Therapy Goal     OT, PT/OT Ongoing, Progressing    Description: Goals to be met by: 01/20/22     Patient will increase functional independence with ADLs by performing:    UE Dressing with Set-up Assistance.  LE Dressing with Minimal Assistance.  Grooming while seated with Modified [place].  Toileting  from toilet with Supervision for hygiene and clothing management.   Bathing from  shower chair/bench with Minimal Assistance.  Step transfer with Minimal Assistance  Toilet transfer to toilet with Minimal Assistance.  Increased functional strength to 4/5 for bilateral UE's.                         Time Tracking:     OT Date of Treatment: 01/12/23  OT Start Time: 1623  OT Stop Time: 1655  OT Total Time (min): 32 min    Billable Minutes:Self Care/Home Management 17  Therapeutic Activity 15    OT/ROSS: OT     ROSS Visit Number: 1 1/12/2023

## 2023-01-12 NOTE — PROGRESS NOTES
"PSYCHIATRY CONSULT PROGRESS NOTE    ENCOUNTER DATE: 2023  SITE: [facility]    DATE OF ADMISSION: 2022  2:14 AM  LENGTH OF STAY: 20 days    CHIEF COMPLAINT   Rachel Zazueta is a 42 y.o. female, seen during daily miguel rounds on the inpatient unit.  Rachel Zazueta presented with the chief complaint of weakness    The patient was seen and examined. The chart was reviewed.     Reviewed notes from Rns and MD and labs from the last 24 hours.    The patient's case was discussed with the treatment team/care providers today including Rns and MD    Staff reports no behavioral or management issues.     The patient has been compliant with treatment.    Subjective 2023    Patient seen today per request of attending MD and case management for psychiatric follow up visit. Staff expressed concern regarding patient's mood following a neurology consult in which she was informed that she have multiple sclerosis. Pt was tearful after neuro consult and requested to "Talk to someone."    Today patient observed lying in bed, aaox4, calm. Endorses being "emotional" after visit with neurologist, states "the  came in immediately after I saw the neurologist, and I was kind of a mess. I'm ok now." She verbalized understanding that a definitive diagnosis has not been made yet. SHe states that in the time since seeing neurology, she has been researching MS, noting that she found "It's a lot more treatable now than it was 10 years ago." To that point, patient states a major factor contributing to her response to neuro visit is "One of my best friends  from MS about 10 years ago."    She states that at this time, she plans to "keep doing what I need to do, and we'll see what happens." She denies suicidal ideation or hopelessness at this time.     Pt does repor continuing anxiety with some difficulty staying asleep as well as low energy during the day. She is amenable to increasing gabapentin dose, as " well as increasing wellbutrin to 300 mg/day and switching to XR formulation.     Psychiatric ROS (observed, reported, or endorsed/denied):  Depressed mood - fluctuating  Interest/pleasure/anhedonia: No  Guilt/hopelessness/worthlessness - No  Changes in Sleep - Yes  Changes in Appetite - No  Changes in Concentration - No  Changes in Energy - No  PMA/R- No  Suicidal- active/passive ideations - No  Homicidal ideations: active/passive ideations - No    Hallucinations - No  Delusions - No  Disorganized behavior - No  Disorganized speech - No  Negative symptoms - No    Elevated mood - No  Decreased need for sleep - No  Grandiosity - No  Racing thoughts - No  Impulsivity - No  Irritability- No  Increased energy - No  Distractibility - No  Increase in goal-directed activity or PMA- No    Symptoms of WOLF - fluctuating  Symptoms of Panic Disorder- No  Symptoms of PTSD - No    Overall progress: Patient is showing moderate improvement    Psychotherapy:  Target symptoms: depression, anxiety   Why chosen therapy is appropriate versus another modality: relevant to diagnosis, evidence based practice  Outcome monitoring methods: self-report, observation  Therapeutic intervention type: insight oriented psychotherapy, supportive psychotherapy  Topics discussed/themes: stress related to medical comorbidities, building skills sets for symptom management, symptom recognition  The patient's response to the intervention is accepting. The patient's progress toward treatment goals is good.   Duration of intervention: 16 minutes.         PAST MEDICAL HISTORY   Past Medical History:   Diagnosis Date    Anemia     Anxiety     Depressed     Diskitis     Hep C w/o coma, chronic     IV drug abuse     Kidney stone     Liver cirrhosis        PSYCHOTROPIC MEDICATIONS   Scheduled Meds:   bisacodyL  10 mg Rectal Daily    [START ON 1/13/2023] buPROPion  300 mg Oral Daily    gabapentin  400 mg Oral TID    lactulose  20 g Oral TID    magnesium oxide  400  mg Oral Daily    meropenem (MERREM) IVPB  1 g Intravenous Q8H    methocarbamoL  500 mg Oral QID    pantoprazole  40 mg Oral Daily    polyethylene glycol  17 g Oral Daily    potassium chloride  20 mEq Oral Daily     Continuous Infusions:  PRN Meds:.ALPRAZolam, gadobutroL, HYDROcodone-acetaminophen, ibuprofen, lorazepam, magnesium hydroxide 400 mg/5 ml, melatonin, ondansetron, promethazine    EXAMINATION    VITALS   Vitals:    01/12/23 0600 01/12/23 0751 01/12/23 1137 01/12/23 1148   BP:  121/65 132/61    BP Location:       Patient Position:       Pulse:  98 95    Resp:  20 20 20   Temp:  98 °F (36.7 °C) 98 °F (36.7 °C)    TempSrc:  Oral Oral    SpO2:  100% 99%    Weight: 97.1 kg (214 lb)      Height:           Body mass index is 33.52 kg/m².    CONSTITUTIONAL  General Appearance: unremarkable, age appropriate    MUSCULOSKELETAL  Muscle Strength and Tone:no tremor, no tic  Abnormal Involuntary Movements: No  Gait and Station: Not observed    PSYCHIATRIC   Level of Consciousness: awake, alert , and oriented  Orientation: person, place, time, and situation  Grooming: Hospital garb  Psychomotor Behavior: normal, cooperative, friendly and cooperative  Speech: normal tone, normal rate, normal pitch, normal volume  Language: grossly intact  Mood: fine  Affect: Consistent with mood and Congruent with thought  Thought Process: linear, logical  Associations: intact   Thought Content: DENIES suicidal ideation, DENIES homicidal ideation, and no delusions  Perceptions: denies hallucinations  Memory: Able to recall past events, Remote intact, and Recent intact  Attention:Attends to interview without distraction  Fund of Knowledge: Aware of current events and Vocabulary appropriate   Estimate if Intelligence:  Average based on work/education history, vocabulary and mental status exam  Insight: has awareness of illness  Judgment: behavior is adequate to circumstances    DIAGNOSTIC TESTING   Laboratory Results  Recent Results (from  the past 24 hour(s))   CBC Auto Differential    Collection Time: 01/12/23  5:23 AM   Result Value Ref Range    WBC 2.28 (L) 3.90 - 12.70 K/uL    RBC 3.43 (L) 4.00 - 5.40 M/uL    Hemoglobin 9.5 (L) 12.0 - 16.0 g/dL    Hematocrit 29.8 (L) 37.0 - 48.5 %    MCV 87 82 - 98 fL    MCH 27.7 27.0 - 31.0 pg    MCHC 31.9 (L) 32.0 - 36.0 g/dL    RDW 16.9 (H) 11.5 - 14.5 %    Platelets 96 (L) 150 - 450 K/uL    MPV 9.9 9.2 - 12.9 fL    Immature Granulocytes 0.4 0.0 - 0.5 %    Gran # (ANC) 1.3 (L) 1.8 - 7.7 K/uL    Immature Grans (Abs) 0.01 0.00 - 0.04 K/uL    Lymph # 0.5 (L) 1.0 - 4.8 K/uL    Mono # 0.4 0.3 - 1.0 K/uL    Eos # 0.1 0.0 - 0.5 K/uL    Baso # 0.01 0.00 - 0.20 K/uL    nRBC 0 0 /100 WBC    Gran % 57.9 38.0 - 73.0 %    Lymph % 22.8 18.0 - 48.0 %    Mono % 15.4 (H) 4.0 - 15.0 %    Eosinophil % 3.1 0.0 - 8.0 %    Basophil % 0.4 0.0 - 1.9 %    Differential Method Automated    Magnesium    Collection Time: 01/12/23  5:23 AM   Result Value Ref Range    Magnesium 2.0 1.6 - 2.6 mg/dL   Comprehensive Metabolic Panel    Collection Time: 01/12/23  5:23 AM   Result Value Ref Range    Sodium 137 136 - 145 mmol/L    Potassium 3.9 3.5 - 5.1 mmol/L    Chloride 106 95 - 110 mmol/L    CO2 27 23 - 29 mmol/L    Glucose 107 70 - 110 mg/dL    BUN 13 6 - 20 mg/dL    Creatinine 0.5 0.5 - 1.4 mg/dL    Calcium 7.8 (L) 8.7 - 10.5 mg/dL    Total Protein 7.5 6.0 - 8.4 g/dL    Albumin 1.8 (L) 3.5 - 5.2 g/dL    Total Bilirubin 0.9 0.1 - 1.0 mg/dL    Alkaline Phosphatase 132 55 - 135 U/L    AST 46 (H) 10 - 40 U/L    ALT 23 10 - 44 U/L    Anion Gap 4 (L) 8 - 16 mmol/L    eGFR >60.0 >60 mL/min/1.73 m^2       MEDICAL DECISION MAKING   Major depressive disorder, chronic, currently in partial remission  Methamphetamine abuse  OUD with MAT vs suboxone use for chronic pain  Anxiety disorder due to known physiological condition       PROBLEM LIST AND MANAGEMENT PLANS  Major depressive disorder, chronic, currently in partial remission  -Patient  counseled  -Restart home bupropion 100 mg BID--Increase to 300 mg/day, switch to xr formulation    Methamphetamine abuse  -Patient counseled  -Denies need for rehab at this time  -Encouraged cessation    OUD with MAT vs suboxone use for chronic pain  -Patient counseled  -Defer to outpatient provider upon discharge    Anxiety disorder due to known physiological condition   -Patient counseled  -Ok to continue alprazolam as ordered, recommend using for shortest possible duration as polypharmacy increases risk for respiratory depression.   -Gabapentin off-label. Will switch from lyrica to gabapentin 300 mg TID per patient request (states gabapentin more effective in managing neuropathic pain)-Increase to 400 mg TID  Discussed diagnosis, risks and benefits of proposed treatment vs alternative treatments vs no treatment, potential side effects of these treatments and the inherent unpredictability of treatment. The patient expresses understanding of the above and displays the capacity to agree with this treatment given said understanding. Patient also agrees that, currently, the benefits outweigh the risks and would like to pursue/continue treatment at this time.      STAFF:   Micky Perkins NP  Psychiatry

## 2023-01-12 NOTE — PT/OT/SLP PROGRESS
Physical Therapy      Patient Name:  Rachel Zazueta   MRN:  [id number]    Patient not seen today secondary to Unarousable. Will follow-up 1/13/23.

## 2023-01-12 NOTE — PT/OT/SLP PROGRESS
Occupational Therapy      Patient Name:  Rachel Zazueta   MRN:  [id number]    Patient not seen today secondary to saying she was too tired and waiting to get suppository . Will follow-up 1/13/23.    1/12/2023

## 2023-01-12 NOTE — SUBJECTIVE & OBJECTIVE
Interval History: Patient seen and eaxmined.     Review of Systems   Constitutional:  Negative for activity change, appetite change, chills, diaphoresis, fatigue and fever.   HENT:  Negative for sore throat.    Eyes:  Negative for pain.   Respiratory:  Negative for cough, choking, chest tightness and shortness of breath.    Cardiovascular:  Negative for chest pain, palpitations and leg swelling.   Gastrointestinal:  Positive for abdominal pain and constipation. Negative for abdominal distention, diarrhea, nausea, rectal pain and vomiting.   Genitourinary:  Negative for dyspareunia, dysuria, flank pain, frequency, genital sores, hematuria and menstrual problem.   Musculoskeletal:  Positive for arthralgias, back pain, gait problem, myalgias and neck pain. Negative for neck stiffness.   Skin:  Negative for pallor, rash and wound.   Neurological:  Positive for weakness. Negative for dizziness, tremors, speech difficulty and headaches.   Psychiatric/Behavioral:  Negative for agitation, behavioral problems, confusion, decreased concentration and dysphoric mood.    Objective:     Vital Signs (Most Recent):  Temp: 98 °F (36.7 °C) (01/12/23 0751)  Pulse: 98 (01/12/23 0751)  Resp: 20 (01/12/23 0751)  BP: 121/65 (01/12/23 0751)  SpO2: 100 % (01/12/23 0751) Vital Signs (24h Range):  Temp:  [98 °F (36.7 °C)-98.6 °F (37 °C)] 98 °F (36.7 °C)  Pulse:  [] 98  Resp:  [18-20] 20  SpO2:  [95 %-100 %] 100 %  BP: (115-127)/(59-66) 121/65     Weight: 97.1 kg (214 lb)  Body mass index is 33.52 kg/m².    Intake/Output Summary (Last 24 hours) at 1/12/2023 1022  Last data filed at 1/12/2023 0645  Gross per 24 hour   Intake 1320 ml   Output 1900 ml   Net -580 ml      Physical Exam  Vitals and nursing note reviewed.   Constitutional:       General: She is not in acute distress.     Appearance: She is obese.   HENT:      Head: Normocephalic and atraumatic.      Right Ear: External ear normal.      Left Ear: External ear normal.      Nose:  Nose normal. No congestion or rhinorrhea.      Mouth/Throat:      Mouth: Mucous membranes are dry.      Pharynx: No oropharyngeal exudate.   Eyes:      General: No scleral icterus.     Extraocular Movements: Extraocular movements intact.      Conjunctiva/sclera: Conjunctivae normal.   Neck:      Vascular: No carotid bruit.      Comments: Limited ROM at baseline  Cardiovascular:      Rate and Rhythm: Normal rate and regular rhythm.      Heart sounds: Murmur heard.     No friction rub. No gallop.   Pulmonary:      Effort: No respiratory distress.      Breath sounds: No stridor. No wheezing, rhonchi or rales.   Chest:      Chest wall: No tenderness.   Abdominal:      General: There is no distension.      Palpations: Abdomen is soft. There is no mass.      Tenderness: There is no abdominal tenderness. There is no right CVA tenderness, left CVA tenderness or guarding.   Musculoskeletal:         General: No swelling, tenderness or deformity.      Cervical back: Normal range of motion and neck supple. No rigidity or tenderness.      Right lower leg: No edema.      Left lower leg: No edema.   Lymphadenopathy:      Cervical: No cervical adenopathy.   Skin:     General: Skin is dry.      Capillary Refill: Capillary refill takes less than 2 seconds.      Coloration: Skin is not jaundiced or pale.      Findings: No bruising or rash.   Neurological:      Mental Status: She is oriented to person, place, and time. Mental status is at baseline.      Cranial Nerves: No cranial nerve deficit.      Sensory: Sensory deficit present.      Motor: Weakness (unable to lift legs against gravity, also unable to plant feet with hips and knees flexed bilaterally, no SI joint tenderness, tenderness over spinous processes at baseline) present.      Comments: Sensation diminished in bilateral legs    Psychiatric:         Mood and Affect: Mood normal.         Behavior: Behavior normal.         Thought Content: Thought content normal.       Comments: Calm, cooperative, depression with uncertainty of current health status and her ability to fully recover     Significant Labs: All pertinent labs within the past 24 hours have been reviewed.  Bilirubin:   Recent Labs   Lab 01/09/23  0538 01/10/23  0546 01/10/23  2350 01/11/23  0523 01/12/23  0523   BILIDIR  --   --   --  0.5*  --    BILITOT 1.0 1.0 0.9 0.9  0.9 0.9     Blood Culture:   Recent Labs   Lab 01/10/23  2344 01/10/23  2348   LABBLOO No Growth to date  No Growth to date No Growth to date  No Growth to date     BMP:   Recent Labs   Lab 01/12/23 0523         K 3.9      CO2 27   BUN 13   CREATININE 0.5   CALCIUM 7.8*   MG 2.0     CBC:   Recent Labs   Lab 01/10/23  2350 01/11/23  0524 01/12/23 0523   WBC 2.24* 2.34* 2.28*   HGB 9.0* 9.1* 9.5*   HCT 27.6* 28.2* 29.8*   PLT 88* 92* 96*     CMP:   Recent Labs   Lab 01/10/23  2350 01/11/23  0523 01/12/23  0523   * 137  137 137   K 4.0 3.8  3.8 3.9    106  106 106   CO2 26 27  27 27   * 104  104 107   BUN 15 13  13 13   CREATININE 0.5 0.5  0.5 0.5   CALCIUM 7.8* 7.7*  7.7* 7.8*   PROT 7.3 7.3  7.3 7.5   ALBUMIN 1.9* 1.8*  1.8*  1.8* 1.8*   BILITOT 0.9 0.9  0.9 0.9   ALKPHOS 123 131  131 132   AST 47* 45*  45* 46*   ALT 23 20  20 23   ANIONGAP 4* 4*  4* 4*     Significant Imaging: I have reviewed all pertinent imaging results/findings within the past 24 hours.

## 2023-01-12 NOTE — ASSESSMENT & PLAN NOTE
Patient has recurrent depression which is moderate and is currently controlled. Will Continue anti-depressant medications. We will consult psychiatry at this time. Patient does not display psychosis at this time. Continue to monitor closely and adjust plan of care as needed.

## 2023-01-13 LAB
ALBUMIN SERPL BCP-MCNC: 1.7 G/DL (ref 3.5–5.2)
ALP SERPL-CCNC: 127 U/L (ref 55–135)
ALT SERPL W/O P-5'-P-CCNC: 23 U/L (ref 10–44)
ANION GAP SERPL CALC-SCNC: 5 MMOL/L (ref 8–16)
AST SERPL-CCNC: 45 U/L (ref 10–40)
BASOPHILS # BLD AUTO: 0.01 K/UL (ref 0–0.2)
BASOPHILS NFR BLD: 0.6 % (ref 0–1.9)
BILIRUB SERPL-MCNC: 0.9 MG/DL (ref 0.1–1)
BUN SERPL-MCNC: 13 MG/DL (ref 6–20)
CALCIUM SERPL-MCNC: 7.8 MG/DL (ref 8.7–10.5)
CHLORIDE SERPL-SCNC: 108 MMOL/L (ref 95–110)
CO2 SERPL-SCNC: 27 MMOL/L (ref 23–29)
CREAT SERPL-MCNC: 0.4 MG/DL (ref 0.5–1.4)
DIFFERENTIAL METHOD: ABNORMAL
EOSINOPHIL # BLD AUTO: 0.1 K/UL (ref 0–0.5)
EOSINOPHIL NFR BLD: 7 % (ref 0–8)
ERYTHROCYTE [DISTWIDTH] IN BLOOD BY AUTOMATED COUNT: 16.6 % (ref 11.5–14.5)
EST. GFR  (NO RACE VARIABLE): >60 ML/MIN/1.73 M^2
GLUCOSE SERPL-MCNC: 124 MG/DL (ref 70–110)
HCT VFR BLD AUTO: 26 % (ref 37–48.5)
HGB BLD-MCNC: 8.2 G/DL (ref 12–16)
IMM GRANULOCYTES # BLD AUTO: 0 K/UL (ref 0–0.04)
IMM GRANULOCYTES NFR BLD AUTO: 0 % (ref 0–0.5)
LYMPHOCYTES # BLD AUTO: 0.4 K/UL (ref 1–4.8)
LYMPHOCYTES NFR BLD: 24 % (ref 18–48)
MAGNESIUM SERPL-MCNC: 1.9 MG/DL (ref 1.6–2.6)
MCH RBC QN AUTO: 27.2 PG (ref 27–31)
MCHC RBC AUTO-ENTMCNC: 31.5 G/DL (ref 32–36)
MCV RBC AUTO: 86 FL (ref 82–98)
MONOCYTES # BLD AUTO: 0.2 K/UL (ref 0.3–1)
MONOCYTES NFR BLD: 14 % (ref 4–15)
NEUTROPHILS # BLD AUTO: 0.9 K/UL (ref 1.8–7.7)
NEUTROPHILS NFR BLD: 54.4 % (ref 38–73)
NRBC BLD-RTO: 0 /100 WBC
PLATELET # BLD AUTO: 88 K/UL (ref 150–450)
PMV BLD AUTO: 9.2 FL (ref 9.2–12.9)
POTASSIUM SERPL-SCNC: 3.8 MMOL/L (ref 3.5–5.1)
PROT SERPL-MCNC: 6.6 G/DL (ref 6–8.4)
RBC # BLD AUTO: 3.01 M/UL (ref 4–5.4)
SODIUM SERPL-SCNC: 140 MMOL/L (ref 136–145)
WBC # BLD AUTO: 1.71 K/UL (ref 3.9–12.7)

## 2023-01-13 PROCEDURE — 25000003 PHARM REV CODE 250: Performed by: EMERGENCY MEDICINE

## 2023-01-13 PROCEDURE — 25000003 PHARM REV CODE 250: Performed by: PSYCHIATRY & NEUROLOGY

## 2023-01-13 PROCEDURE — 99900031 HC PATIENT EDUCATION (STAT)

## 2023-01-13 PROCEDURE — 97530 THERAPEUTIC ACTIVITIES: CPT

## 2023-01-13 PROCEDURE — 99233 PR SUBSEQUENT HOSPITAL CARE,LEVL III: ICD-10-PCS | Mod: ,,, | Performed by: STUDENT IN AN ORGANIZED HEALTH CARE EDUCATION/TRAINING PROGRAM

## 2023-01-13 PROCEDURE — 25000003 PHARM REV CODE 250: Performed by: INTERNAL MEDICINE

## 2023-01-13 PROCEDURE — 99900035 HC TECH TIME PER 15 MIN (STAT)

## 2023-01-13 PROCEDURE — 25000003 PHARM REV CODE 250: Performed by: STUDENT IN AN ORGANIZED HEALTH CARE EDUCATION/TRAINING PROGRAM

## 2023-01-13 PROCEDURE — 85025 COMPLETE CBC W/AUTO DIFF WBC: CPT | Performed by: INTERNAL MEDICINE

## 2023-01-13 PROCEDURE — 11000001 HC ACUTE MED/SURG PRIVATE ROOM

## 2023-01-13 PROCEDURE — 36415 COLL VENOUS BLD VENIPUNCTURE: CPT | Performed by: INTERNAL MEDICINE

## 2023-01-13 PROCEDURE — 63600175 PHARM REV CODE 636 W HCPCS: Performed by: STUDENT IN AN ORGANIZED HEALTH CARE EDUCATION/TRAINING PROGRAM

## 2023-01-13 PROCEDURE — 94761 N-INVAS EAR/PLS OXIMETRY MLT: CPT

## 2023-01-13 PROCEDURE — 80053 COMPREHEN METABOLIC PANEL: CPT | Performed by: INTERNAL MEDICINE

## 2023-01-13 PROCEDURE — 97110 THERAPEUTIC EXERCISES: CPT

## 2023-01-13 PROCEDURE — 27000207 HC ISOLATION

## 2023-01-13 PROCEDURE — 97535 SELF CARE MNGMENT TRAINING: CPT

## 2023-01-13 PROCEDURE — 83735 ASSAY OF MAGNESIUM: CPT | Performed by: INTERNAL MEDICINE

## 2023-01-13 PROCEDURE — 99233 SBSQ HOSP IP/OBS HIGH 50: CPT | Mod: ,,, | Performed by: STUDENT IN AN ORGANIZED HEALTH CARE EDUCATION/TRAINING PROGRAM

## 2023-01-13 RX ORDER — AMOXICILLIN 250 MG/1
1000 CAPSULE ORAL EVERY 12 HOURS
Status: DISCONTINUED | OUTPATIENT
Start: 2023-01-14 | End: 2023-01-19 | Stop reason: HOSPADM

## 2023-01-13 RX ORDER — KETOROLAC TROMETHAMINE 30 MG/ML
30 INJECTION, SOLUTION INTRAMUSCULAR; INTRAVENOUS ONCE
Status: DISCONTINUED | OUTPATIENT
Start: 2023-01-13 | End: 2023-01-13

## 2023-01-13 RX ORDER — TIZANIDINE 2 MG/1
4 TABLET ORAL EVERY 8 HOURS
Status: COMPLETED | OUTPATIENT
Start: 2023-01-13 | End: 2023-01-14

## 2023-01-13 RX ADMIN — GABAPENTIN 400 MG: 400 CAPSULE ORAL at 10:01

## 2023-01-13 RX ADMIN — METHOCARBAMOL TABLETS 500 MG: 500 TABLET, COATED ORAL at 09:01

## 2023-01-13 RX ADMIN — GABAPENTIN 400 MG: 400 CAPSULE ORAL at 09:01

## 2023-01-13 RX ADMIN — LACTULOSE 20 G: 20 SOLUTION ORAL at 10:01

## 2023-01-13 RX ADMIN — TIZANIDINE 4 MG: 2 TABLET ORAL at 03:01

## 2023-01-13 RX ADMIN — HYDROCODONE BITARTRATE AND ACETAMINOPHEN 1 TABLET: 5; 325 TABLET ORAL at 11:01

## 2023-01-13 RX ADMIN — TIZANIDINE 4 MG: 2 TABLET ORAL at 10:01

## 2023-01-13 RX ADMIN — LACTULOSE 20 G: 20 SOLUTION ORAL at 03:01

## 2023-01-13 RX ADMIN — HYDROCODONE BITARTRATE AND ACETAMINOPHEN 1 TABLET: 5; 325 TABLET ORAL at 10:01

## 2023-01-13 RX ADMIN — HYDROCODONE BITARTRATE AND ACETAMINOPHEN 1 TABLET: 5; 325 TABLET ORAL at 05:01

## 2023-01-13 RX ADMIN — MAGNESIUM OXIDE TAB 400 MG (241.3 MG ELEMENTAL MG) 400 MG: 400 (241.3 MG) TAB at 09:01

## 2023-01-13 RX ADMIN — LACTULOSE 20 G: 20 SOLUTION ORAL at 09:01

## 2023-01-13 RX ADMIN — PANTOPRAZOLE SODIUM 40 MG: 40 TABLET, DELAYED RELEASE ORAL at 09:01

## 2023-01-13 RX ADMIN — METHOCARBAMOL TABLETS 500 MG: 500 TABLET, COATED ORAL at 12:01

## 2023-01-13 RX ADMIN — POLYETHYLENE GLYCOL (3350) 17 G: 17 POWDER, FOR SOLUTION ORAL at 09:01

## 2023-01-13 RX ADMIN — BUPROPION HYDROCHLORIDE 300 MG: 150 TABLET, EXTENDED RELEASE ORAL at 09:01

## 2023-01-13 RX ADMIN — ALPRAZOLAM 0.25 MG: 0.25 TABLET ORAL at 11:01

## 2023-01-13 RX ADMIN — MEROPENEM 1 G: 1 INJECTION, POWDER, FOR SOLUTION INTRAVENOUS at 12:01

## 2023-01-13 RX ADMIN — GABAPENTIN 400 MG: 400 CAPSULE ORAL at 03:01

## 2023-01-13 RX ADMIN — MEROPENEM 1 G: 1 INJECTION, POWDER, FOR SOLUTION INTRAVENOUS at 05:01

## 2023-01-13 RX ADMIN — POTASSIUM CHLORIDE 20 MEQ: 1500 TABLET, EXTENDED RELEASE ORAL at 09:01

## 2023-01-13 RX ADMIN — IBUPROFEN 400 MG: 400 TABLET, FILM COATED ORAL at 09:01

## 2023-01-13 NOTE — PLAN OF CARE
Problem: Physical Therapy  Goal: Physical Therapy Goal  Description: Goals to be met by: 2023     Patient will increase functional independence with mobility by performin. Supine to sit with Modified [place]  2. Sit to supine with Modified [place]  3. Sit to stand transfer with Stand-by Assistance  4. Bed to chair transfer with Stand-by Assistance using Rolling Walker  5. Gait  x 50 feet with Contact Guard Assistance using Rolling Walker.   6. Sitting at edge of bed x10 minutes with [place] to perform (B) LE therex      Outcome: Ongoing, Progressing

## 2023-01-13 NOTE — PLAN OF CARE
Plan of care discussed with pt at bedside, pt verbalized understanding. Will continue to monitor.

## 2023-01-13 NOTE — PT/OT/SLP PROGRESS
Occupational Therapy   Treatment    Name: Rachel Zazueta  MRN: [id number]  Admitting Diagnosis:  Debility       Recommendations:     Discharge Recommendations: other (see comments) (TBD)  Discharge Equipment Recommendations:  other (see comments) (TBD)  Barriers to discharge:  Other (Comment) (Medical and functional status)    Assessment:     aRchel Zazueta is a 42 y.o. female with a medical diagnosis of Debility.  She presents with improved functional performance with bed mobility as noted by mostly min assist on this date with exception to sit to supine transition. Performance deficits affecting function are weakness, impaired endurance, impaired self care skills, impaired functional mobility, impaired balance, decreased upper extremity function, decreased lower extremity function, decreased safety awareness, pain, decreased ROM.     Rehab Prognosis:  Fair; patient would benefit from acute skilled OT services to address these deficits and reach maximum level of function.       Plan:     Patient to be seen 5 x/week to address the above listed problems via self-care/home management, therapeutic activities, therapeutic exercises  Plan of Care Expires: 01/20/23  Plan of Care Reviewed with: patient    Subjective     Pain/Comfort:  Pain Rating 1: 9/10  Location - Side 1: Bilateral  Location - Orientation 1: lower  Location 1: back  Pain Addressed 1: Reposition, Cessation of Activity, Nurse notified, Pre-medicate for activity, Distraction  Pain Rating Post-Intervention 1: 9/10    Objective:     Communicated with: nurse prior to session.  Patient found supine with PureWick upon OT entry to room.    General Precautions: Standard, fall    Orthopedic Precautions:N/A  Braces: N/A  Respiratory Status: Room air     Occupational Performance:     Bed Mobility:    Patient completed Rolling/Turning to Left with  minimum assistance  Patient completed Rolling/Turning to Right with minimum assistance  Patient completed  Scooting/Bridging with minimum assistance  Patient completed Supine to Sit with minimum assistance  Patient completed Sit to Supine with moderate assistance     Functional Mobility/Transfers:  Pt refused standing activity on this date.  Functional Mobility: Pt unable to ambulate at this time.    Activities of Daily Living:  Grooming: minimum assistance        [facility] 6 Click ADL:      Treatment & Education:  Pt was cooperative without verbal encouragement on this date while exhibiting positive affect. She performed bed mobility requiring mostly min assist secondary to min stabilization of trunk with some assist with (L) LE off EOB during supine to sit transition, decreased steadying assist of trunk when scooting to EOB, and lifting assist of bilateral LE's during sit to supine transition with min verbal and tactile cueing for technique to facilitate improved performance and reduce pain. Pt then participated in ADL retraining regarding washing / drying of hair requiring min assist secondary to steadying assist of trunk with additional assist to wash / dry back of head. Also, she participated in therapeutic activity addressing functional reaching, gross motor coordination, trunk control, static / dynamic sitting balance, and energy for task / endurance challenging her to reach in multiple planes utilizing bilateral Ue's requiring continued verbal and tactile cueing to facilitate proper weight shifting and sitting posture tolerating approx 12 min at EOB unsupported.     Patient left supine with all lines intact, call button in reach, and nurse notified    GOALS:   Multidisciplinary Problems       Occupational Therapy Goals          Problem: Occupational Therapy    Goal Priority Disciplines Outcome Interventions   Occupational Therapy Goal     OT, PT/OT Ongoing, Progressing    Description: Goals to be met by: 01/20/22     Patient will increase functional independence with ADLs by performing:    UE Dressing with Set-up  Assistance.  LE Dressing with Minimal Assistance.  Grooming while seated with Modified [place].  Toileting from toilet with Supervision for hygiene and clothing management.   Bathing from  shower chair/bench with Minimal Assistance.  Step transfer with Minimal Assistance  Toilet transfer to toilet with Minimal Assistance.  Increased functional strength to 4/5 for bilateral UE's.                         Time Tracking:     OT Date of Treatment: 01/13/23  OT Start Time: 1502  OT Stop Time: 1536  OT Total Time (min): 34 min    Billable Minutes:Self Care/Home Management 18  Therapeutic Activity 16    OT/ROSS: OT     ROSS Visit Number: 1 1/13/2023

## 2023-01-13 NOTE — PLAN OF CARE
"40 y/o F with PMH of cirrhosis, Hep C, pancytopenia, polysubstance abuse, and epidural abscess s/p L3-L4 laminectomy and L3-L5 TLIF (4/2021; blood and surgical cultures positive for group B strep) admitted from [facility] [place] [facility] with progressive lumbar spondylodiscitis and hardware failure. IM6 consulted for preop risk stratification and medical optimization.      Chart reviewed. S/p washout and hardware removal 2/21. Plan for reinstrumentation on 3/2.     MELD-Na score: 9 at 2/27/2022  2:59 AM  MELD score: 9 at 2/27/2022  2:59 AM  Calculated from:  Serum Creatinine: 0.6 mg/dL (Using min of 1 mg/dL) at 2/27/2022  2:59 AM  Serum Sodium: 138 mmol/L (Using max of 137 mmol/L) at 2/27/2022  2:59 AM  Total Bilirubin: 0.9 mg/dL (Using min of 1 mg/dL) at 2/27/2022  2:59 AM  INR(ratio): 1.3 at 2/27/2022  2:59 AM  Age: 41 years       - RCRI 0   - blood and biopsy cultures negative so far  - surgical cultures negative so far   - continue rocephin per ID recs, PICC placed   - History of leukopenia, today WBC are 1.93 with ANC of 1300. Continue to trend CBC.  - daily CMP, INR   - will need outpatient hepatology referral    - recommend placement on discharge for continued IV abx given hx of drug use and noncompliance with previous IV abx (per ID will likely need 8 weeks of antibiotics from day of surgery)     Please secure [facility] Medicine  ("Medicine Consult Only") or contact me directly for any additional questions or concerns.    Alexandra Mcmullen, DO  Internal Medicine, PGY-2  [facility]    " Type Of Destruction Used: Curettage

## 2023-01-13 NOTE — PLAN OF CARE
Recommendations  1. Rec'd continue current diet order: Heart Healthy.   2. Rec'd ONS: Ensure Enlive BID to provide 700kcal and 40g protein.   3. Rec'd encourage PO intake and compliance with drinking ONS.   4. RD to follow and make rec's accordingly.  Goals:   1. Pt will consume > 75% EEN via PO intake by next RD follow up.  Nutrition Goal Status: progressing towards goal

## 2023-01-13 NOTE — PROGRESS NOTES
"[facility]  Adult Nutrition  Progress Note    SUMMARY       Recommendations  1. Rec'd continue current diet order: Heart Healthy.   2. Rec'd ONS: Ensure Enlive BID to provide 700kcal and 40g protein.   3. Rec'd encourage PO intake and compliance with drinking ONS.   4. RD to follow and make rec's accordingly.  Goals:   1. Pt will consume > 75% EEN via PO intake by next RD follow up.  Nutrition Goal Status: progressing towards goal  Communication of RD Recs: other (comment) (Documented in POC)    Assessment and Plan    Nutrition Problem  No nutrition diagnosis at this time.     Reason for Assessment    Reason For Assessment: RD follow-up  Diagnosis: other (see comments) (Debility)  Relevant Medical History: Anemia, Anxiety, Depressed, Diskitis, Chronic Hep C w/o coma,  IV drug abuse, Kidney stone, Liver cirrhosis  Interdisciplinary Rounds: did not attend  General Information Comments: Followed up on pt this morning. Pt is eating an average of 58% of meals. Rec'd continue to encourage PO intake. RD to follow and make rec's accordingly.  Nutrition Discharge Planning: TBD as care progresses    Nutrition Risk Screen    Nutrition Risk Screen: no indicators present    Nutrition/Diet History    Spiritual, Cultural Beliefs, Worship Practices, Values that Affect Care: no  Food Allergies: shellfish, tree nut (iodine and iodide containing products)  Factors Affecting Nutritional Intake: decreased appetite    Anthropometrics    Temp: 97.9 °F (36.6 °C)  Height: 5' 7" (170.2 cm)  Height (inches): 67 in  Weight Method: Bed Scale  Weight: 97.2 kg (214 lb 3.2 oz)  Weight (lb): 214.2 lb  Ideal Body Weight (IBW), Female: 135 lb  % Ideal Body Weight, Female (lb): 159.22 %  BMI (Calculated): 33.5  BMI Grade: 30 - 34.9- obesity - grade I    Lab/Procedures/Meds    Pertinent Labs Reviewed: reviewed  Pertinent Medications Reviewed: reviewed    Estimated/Assessed Needs    Weight Used For Calorie Calculations: 70.2 kg (154 lb 11.9 " oz) (AdjBW)  Energy Calorie Requirements (kcal): 0873-6930 (20-25kcal/kg AdjBW)  Energy Need Method: Kcal/kg  Protein Requirements: 49-61 (0.8-1.0g/kg IBW)  Weight Used For Protein Calculations: 61.2 kg (134 lb 15.8 oz) (IBW)  Fluid Requirements (mL): 5952-5493 (1mL/kcal)  Estimated Fluid Requirement Method: RDA Method  RDA Method (mL): 1404    Nutrition Prescription Ordered    Current Diet Order: Heart Healthy  Oral Nutrition Supplement: Ensure Enlive (TID)    Evaluation of Received Nutrient/Fluid Intake    % Kcal Needs: 50-75%  % Protein Needs: 50-75%  I/O: +400  Energy Calories Required: not meeting needs  Protein Required: not meeting needs  Tolerance: tolerating  % Intake of Estimated Energy Needs: 50 - 75 %  % Meal Intake: 50 - 75 %    Nutrition Risk    Level of Risk/Frequency of Follow-up: moderate     Monitor and Evaluation    Food and Nutrient Intake: energy intake, food and beverage intake  Food and Nutrient Adminstration: diet order  Knowledge/Beliefs/Attitudes: food and nutrition knowledge/skill, beliefs and attitudes  Physical Activity and Function: nutrition-related ADLs and IADLs  Anthropometric Measurements: height/length, weight, weight change, body mass index  Biochemical Data, Medical Tests and Procedures: electrolyte and renal panel, gastrointestinal profile, glucose/endocrine profile, inflammatory profile, lipid profile  Nutrition-Focused Physical Findings: overall appearance     Nutrition Follow-Up    RD Follow-up?: Yes

## 2023-01-13 NOTE — PT/OT/SLP PROGRESS
"Physical Therapy Treatment    Patient Name:  Rachel Zazueta   MRN:  [id number]    Recommendations:     Discharge Recommendations: acute care hospital (for medical management)  Discharge Equipment Recommendations: wheelchair  Barriers to discharge:  medical issues     Assessment:     Rachel Zazueta is a 42 y.o. female admitted with a medical diagnosis of Debility.  She presents with the following impairments/functional limitations: weakness, impaired functional mobility, impaired cardiopulmonary response to activity, impaired endurance, pain, decreased coordination, impaired joint extensibility, impaired sensation, impaired balance, impaired muscle length, impaired self care skills, decreased lower extremity function, decreased ROM . Patient was received supine in bed.  She is c/o inability to move.  Refused to sit at edge of bed but agreeable to do ROM exercises.  She reports that she is having involuntary muscle spasms and they are trying to move her to [place] to get medical work up. She tolerated PROM to AAROM on both LE with muscle spasm during ROM.  Mod assist with rolling <> side using side rail.  Encourage to do pressure relief and do AROM on both UE/LE as tolerated.     Rehab Prognosis: Fair; patient would benefit from acute skilled PT services to address these deficits and reach maximum level of function.    Recent Surgery: * No surgery found *      Plan:     During this hospitalization, patient to be seen 5 x/week to address the identified rehab impairments via gait training, therapeutic activities, therapeutic exercises, neuromuscular re-education, wheelchair management/training and progress toward the following goals:    Plan of Care Expires:  01/16/23    Subjective     Chief Complaint: "I have spasms."   Patient/Family Comments/goals: Get some help and find out what is going on with me.   Pain/Comfort:  Pain Rating 1:  (did not quantify)  Location - Side 1: Bilateral  Location 1: leg  Pain " Addressed 1: Reposition, Distraction, Cessation of Activity, Nurse notified  Pain Rating Post-Intervention 1:  (did not quantify)      Objective:     Communicated with nurse and patient  prior to session.  Patient found supine with peripheral IV, PureWick upon PT entry to room.     General Precautions: Standard, fall, contact  Orthopedic Precautions: N/A  Braces: N/A  Respiratory Status: Room air     Functional Mobility:  Bed Mobility:   Rolling left and right: Mod assist with side rail, refused supine <> sit   Transfers:   refused   Gait: unable   Balance: refused to sit up       AM-PAC 6 CLICK MOBILITY  Turning over in bed (including adjusting bedclothes, sheets and blankets)?: 2  Sitting down on and standing up from a chair with arms (e.g., wheelchair, bedside commode, etc.): 1  Moving from lying on back to sitting on the side of the bed?: 1  Moving to and from a bed to a chair (including a wheelchair)?: 1  Need to walk in hospital room?: 1  Climbing 3-5 steps with a railing?: 1  Basic Mobility Total Score: 7       Treatment & Education:  AAROM on both Mark 10 reps x 1 consisting of AP's, hip/knee flexion, hip abd/adduction and SLR ; rolling side <> side     Patient left HOB elevated with all lines intact, call button in reach, and nurse notified..    GOALS:   Multidisciplinary Problems       Physical Therapy Goals          Problem: Physical Therapy    Goal Priority Disciplines Outcome Goal Variances Interventions   Physical Therapy Goal     PT, PT/OT Ongoing, Progressing     Description: Goals to be met by: 2023     Patient will increase functional independence with mobility by performin. Supine to sit with Modified [place]  2. Sit to supine with Modified [place]  3. Sit to stand transfer with Stand-by Assistance  4. Bed to chair transfer with Stand-by Assistance using Rolling Walker  5. Gait  x 50 feet with Contact Guard Assistance using Rolling Walker.   6. Sitting at edge of bed x10  minutes with [place] to perform (B) LE therex                           Time Tracking:     PT Received On: 01/13/23  PT Start Time: 1006     PT Stop Time: 1020  PT Total Time (min): 14 min     Billable Minutes: Therapeutic Exercise 14    Treatment Type: Treatment  PT/PTA: PT           01/13/2023

## 2023-01-14 ENCOUNTER — PATIENT MESSAGE (OUTPATIENT)
Dept: PRIMARY CARE CLINIC | Facility: CLINIC | Age: 43
End: 2023-01-14
Payer: MEDICAID

## 2023-01-14 LAB
ALBUMIN SERPL BCP-MCNC: 1.8 G/DL (ref 3.5–5.2)
ALP SERPL-CCNC: 137 U/L (ref 55–135)
ALT SERPL W/O P-5'-P-CCNC: 28 U/L (ref 10–44)
ANION GAP SERPL CALC-SCNC: 3 MMOL/L (ref 8–16)
AST SERPL-CCNC: 58 U/L (ref 10–40)
BASOPHILS # BLD AUTO: 0.03 K/UL (ref 0–0.2)
BASOPHILS NFR BLD: 1.2 % (ref 0–1.9)
BILIRUB SERPL-MCNC: 1.1 MG/DL (ref 0.1–1)
BUN SERPL-MCNC: 18 MG/DL (ref 6–20)
CALCIUM SERPL-MCNC: 8.1 MG/DL (ref 8.7–10.5)
CHLORIDE SERPL-SCNC: 106 MMOL/L (ref 95–110)
CO2 SERPL-SCNC: 29 MMOL/L (ref 23–29)
CREAT SERPL-MCNC: 0.4 MG/DL (ref 0.5–1.4)
DIFFERENTIAL METHOD: ABNORMAL
EOSINOPHIL # BLD AUTO: 0.2 K/UL (ref 0–0.5)
EOSINOPHIL NFR BLD: 7.5 % (ref 0–8)
ERYTHROCYTE [DISTWIDTH] IN BLOOD BY AUTOMATED COUNT: 16.2 % (ref 11.5–14.5)
EST. GFR  (NO RACE VARIABLE): >60 ML/MIN/1.73 M^2
GLUCOSE SERPL-MCNC: 110 MG/DL (ref 70–110)
HCT VFR BLD AUTO: 27 % (ref 37–48.5)
HGB BLD-MCNC: 8.6 G/DL (ref 12–16)
IMM GRANULOCYTES # BLD AUTO: 0.01 K/UL (ref 0–0.04)
IMM GRANULOCYTES NFR BLD AUTO: 0.4 % (ref 0–0.5)
LYMPHOCYTES # BLD AUTO: 0.5 K/UL (ref 1–4.8)
LYMPHOCYTES NFR BLD: 18.5 % (ref 18–48)
MAGNESIUM SERPL-MCNC: 2 MG/DL (ref 1.6–2.6)
MCH RBC QN AUTO: 27.3 PG (ref 27–31)
MCHC RBC AUTO-ENTMCNC: 31.9 G/DL (ref 32–36)
MCV RBC AUTO: 86 FL (ref 82–98)
MONOCYTES # BLD AUTO: 0.4 K/UL (ref 0.3–1)
MONOCYTES NFR BLD: 15 % (ref 4–15)
NEUTROPHILS # BLD AUTO: 1.5 K/UL (ref 1.8–7.7)
NEUTROPHILS NFR BLD: 57.4 % (ref 38–73)
NRBC BLD-RTO: 0 /100 WBC
PLATELET # BLD AUTO: 104 K/UL (ref 150–450)
PMV BLD AUTO: 9.5 FL (ref 9.2–12.9)
POTASSIUM SERPL-SCNC: 4.5 MMOL/L (ref 3.5–5.1)
PROT SERPL-MCNC: 6.9 G/DL (ref 6–8.4)
RBC # BLD AUTO: 3.15 M/UL (ref 4–5.4)
SODIUM SERPL-SCNC: 138 MMOL/L (ref 136–145)
WBC # BLD AUTO: 2.54 K/UL (ref 3.9–12.7)

## 2023-01-14 PROCEDURE — 25000003 PHARM REV CODE 250: Performed by: INTERNAL MEDICINE

## 2023-01-14 PROCEDURE — 25000003 PHARM REV CODE 250: Performed by: STUDENT IN AN ORGANIZED HEALTH CARE EDUCATION/TRAINING PROGRAM

## 2023-01-14 PROCEDURE — 36415 COLL VENOUS BLD VENIPUNCTURE: CPT | Performed by: INTERNAL MEDICINE

## 2023-01-14 PROCEDURE — 85025 COMPLETE CBC W/AUTO DIFF WBC: CPT | Performed by: INTERNAL MEDICINE

## 2023-01-14 PROCEDURE — 83735 ASSAY OF MAGNESIUM: CPT | Performed by: INTERNAL MEDICINE

## 2023-01-14 PROCEDURE — 11000001 HC ACUTE MED/SURG PRIVATE ROOM

## 2023-01-14 PROCEDURE — 80053 COMPREHEN METABOLIC PANEL: CPT | Performed by: INTERNAL MEDICINE

## 2023-01-14 PROCEDURE — 25000003 PHARM REV CODE 250: Performed by: PSYCHIATRY & NEUROLOGY

## 2023-01-14 PROCEDURE — 27000207 HC ISOLATION

## 2023-01-14 RX ADMIN — ALPRAZOLAM 0.25 MG: 0.25 TABLET ORAL at 11:01

## 2023-01-14 RX ADMIN — GABAPENTIN 400 MG: 400 CAPSULE ORAL at 05:01

## 2023-01-14 RX ADMIN — GABAPENTIN 400 MG: 400 CAPSULE ORAL at 09:01

## 2023-01-14 RX ADMIN — AMOXICILLIN 1000 MG: 250 CAPSULE ORAL at 09:01

## 2023-01-14 RX ADMIN — ALPRAZOLAM 0.25 MG: 0.25 TABLET ORAL at 05:01

## 2023-01-14 RX ADMIN — HYDROCODONE BITARTRATE AND ACETAMINOPHEN 1 TABLET: 5; 325 TABLET ORAL at 05:01

## 2023-01-14 RX ADMIN — BUPROPION HYDROCHLORIDE 300 MG: 150 TABLET, EXTENDED RELEASE ORAL at 09:01

## 2023-01-14 RX ADMIN — HYDROCODONE BITARTRATE AND ACETAMINOPHEN 1 TABLET: 5; 325 TABLET ORAL at 11:01

## 2023-01-14 RX ADMIN — LACTULOSE 20 G: 20 SOLUTION ORAL at 09:01

## 2023-01-14 RX ADMIN — POTASSIUM CHLORIDE 20 MEQ: 1500 TABLET, EXTENDED RELEASE ORAL at 09:01

## 2023-01-14 RX ADMIN — TIZANIDINE 4 MG: 2 TABLET ORAL at 05:01

## 2023-01-14 RX ADMIN — MAGNESIUM OXIDE TAB 400 MG (241.3 MG ELEMENTAL MG) 400 MG: 400 (241.3 MG) TAB at 09:01

## 2023-01-14 RX ADMIN — POLYETHYLENE GLYCOL (3350) 17 G: 17 POWDER, FOR SOLUTION ORAL at 09:01

## 2023-01-14 RX ADMIN — HYDROCODONE BITARTRATE AND ACETAMINOPHEN 1 TABLET: 5; 325 TABLET ORAL at 06:01

## 2023-01-14 RX ADMIN — PANTOPRAZOLE SODIUM 40 MG: 40 TABLET, DELAYED RELEASE ORAL at 09:01

## 2023-01-14 NOTE — ASSESSMENT & PLAN NOTE
This patient does have evidence of infective focus  My overall impression is sepsis. Vital signs were reviewed and noted in progress note.  Antibiotics given-   Antibiotics (From admission, onward)    Start     Stop Route Frequency Ordered    01/14/23 0900  amoxicillin capsule 1,000 mg         -- Oral Every 12 hours 01/13/23 1947 01/02/23 1515  meropenem (MERREM) 1 g in sodium chloride 0.9 % 100 mL IVPB (MB+)        See Hyperspace for full Linked Orders Report.    01/03 0714 IV Every 8 hours (non-standard times) 01/02/23 0923    12/23/22 0533  vancomycin (VANCOCIN) 1,000 mg injection        Note to Pharmacy: Created by cabinet override    12/23 1744   12/23/22 0533    12/23/22 0405  piperacillin-tazobactam (ZOSYN) 4.5 gram injection        Note to Pharmacy: Created by cabinet override    12/23 1614   12/23/22 0405        Cultures were taken-   Microbiology Results (last 7 days)     Procedure Component Value Units Date/Time    Blood culture [[id number]] Collected: 01/10/23 2348    Order Status: Completed Specimen: Blood Updated: 01/14/23 0613     Blood Culture, Routine No Growth to date      No Growth to date      No Growth to date      No Growth to date    Blood culture [[id number]] Collected: 01/10/23 2344    Order Status: Completed Specimen: Blood Updated: 01/14/23 0613     Blood Culture, Routine No Growth to date      No Growth to date      No Growth to date      No Growth to date    Blood culture [[id number]] Collected: 01/10/23 0616    Order Status: Completed Specimen: Blood Updated: 01/13/23 2012     Blood Culture, Routine No growth to date      No Growth to date      No Growth to date      No Growth to date    Blood culture [[id number]] Collected: 01/10/23 0621    Order Status: Completed Specimen: Blood Updated: 01/13/23 2012     Blood Culture, Routine No growth to date      No Growth to date      No Growth to date      No Growth to date    Culture, Respiratory with Gram Stain [[id number]]     Order Status: No  result Specimen: Respiratory         Latest lactate reviewed, they are-  No results for input(s): LACTATE in the last 72 hours.    Organ dysfunction indicated by Acute kidney injury, Encephalopathy  and Acute liver injury  Source- ?    Source control Achieved by- see orders  - Merrem day 7 of 7 (completed on 1/3/23)  - Blood cx x2 (1/2/23) final report no growth    1/7 resolved  1/10 overnight elevated temperature reading, no worsening overall pain. Follow up Blood cultures and imagining.   1/11 second evening with elevated temperature >101F with symptoms, started Merrem  1/13 48 hours of defervescence, NGTD x 3 days on blood cultures (1/10 and 1/11), will discontinue Merrem. Per discussion with Pharm ID, will resume suppressive therapy with amoxicillin 1g BID starting 1/14 1/14:  Blood cultures remain negative. Continue suppressive therapy.

## 2023-01-14 NOTE — ASSESSMENT & PLAN NOTE
Check xray of back due to pain, increase in scoliosis.   1/10 given proximal muscle weakness with slow improvement with daily PT and OT, will follow up MRI brain and spine  Limitations with facility's imagining capabilities, cannot rule out cord compression over thoracic and lumbar spine.   [facility] neurosurgery has accepted return of patient to their service for further evaluation. Currently on hospital wide diversion.     MRI brain and c spine below.  Cannot r/u cord compression over thoracic and lumbar spine.  Continue with plan for transfer.     MRI BRAIN WITHOUT CONTRAST     CLINICAL HISTORY:  Debility, generalized weakness and pain     COMPARISON:  MRI brain 02/14/2022     TECHNIQUE:  Multiplanar multisequence MR imaging performed of the brain without contrast     FINDINGS:  No detected diffusion signal changes to suggest recent ischemia.  No ventricular or basal cistern effacement.  No midline shift or mass effect.  Major intracranial flow voids appear intact.  Paranasal sinuses and mastoid air cells are clear.  Calvarial signal is intact.  No signal change to suggest recent or remote hemorrhage.  Prominent gliotic nonspecific white matter signal change in the subcortical and periventricular regions of bilateral cerebral hemispheres unchanged in extent and distribution compared to the prior study.  Corpus callosum, pituitary gland, and remaining midline structures are unremarkable.     Impression:     1. No acute intracranial abnormality identified.  2. Nonspecific gliotic white matter signal change within bilateral cerebral hemispheres similar in extent and distribution greater than expected for patient age.  This may be related to chronic small vessel ischemia.  Numerous chronic systemic illnesses can result in this appearance.  Demyelinating disease is also in the differential.        Electronically signed by: Deion Patel MD  Date:                                            01/10/2023  Time:                                            14:55    Narrative & Impression  EXAMINATION:  MRI CERVICAL SPINE WITHOUT CONTRAST     CLINICAL HISTORY:  Generalized weakness     COMPARISON:  No priors available     TECHNIQUE:  Multiplanar multisequence MR imaging performed of the cervical spine without contrast     FINDINGS:  Imaged portions of the spinal cord demonstrate normal size and signal.  Vertebral bodies are normal in height and alignment.  Artifact present on portions of this exam.  Degenerative related endplate signal changes at C4 through C7.  Marginal osteophytes are present at these levels.     C2-C3: Disc normal in height without herniation.  No spinal canal or foraminal narrowing detected.     C3-C4: Slight loss of disc space height without significant herniation.  No spinal canal or left foraminal narrowing.  Mild right foraminal narrowing related to uncovertebral osteophyte.     C4-C5: Loss of disc space height with broad-based disc osteophyte.  Disc osteophyte approximates the anterior aspect of the cord right of midline.  Artifact limits evaluation.  The may be trace indentation of the cord.  AP diameter of the thecal sac measures 6 mm in this region.  Neural foramen are not well evaluated secondary to artifact.     C5-C6: Loss of disc space height with broad-based disc osteophyte resulting in mild indentation on the anterior thecal sac approximating the anterior cord without definite cord flattening.  AP diameter of the thecal sac measures just over 8 mm.  Neural foramen are not well evaluated secondary to artifact.  Prominent uncovertebral osteophytes do however appear to be present.     C6-C7: Loss of disc space height with mild broad-based disc osteophyte resulting in mild indentation on the anterior thecal sac, but no cord flattening.  AP diameter of the thecal sac measures 8.5 mm.  Artifact limits evaluation of the neural foramen, but prominent bilateral uncovertebral osteophytes appear to be  present.     C7-T1: Maintained disc space height with minimal protrusion at the left lateral recess resulting in no significant spinal canal narrowing.  Limited evaluation of the neural foramen secondary to artifact.     Impression:     Suboptimal study secondary to artifact on portions of this exam.  Multilevel spondylosis is present extending from C3-C7 with individual levels detailed above.        Electronically signed by: Deion Patel MD  Date:                                            01/10/2023  Time:                                           15:01

## 2023-01-14 NOTE — PLAN OF CARE
Problem: Adult Inpatient Plan of Care  Goal: Optimal Comfort and Wellbeing  Outcome: Ongoing, Not Progressing     Problem: Adult Inpatient Plan of Care  Goal: Readiness for Transition of Care  Outcome: Ongoing, Not Progressing     Problem: Adjustment to Illness (Sepsis/Septic Shock)  Goal: Optimal Coping  Outcome: Ongoing, Not Progressing         Problem: Infection  Goal: Absence of Infection Signs and Symptoms  Outcome: Ongoing, Progressing     Problem: Adult Inpatient Plan of Care  Goal: Plan of Care Review  Outcome: Ongoing, Progressing     Problem: Adult Inpatient Plan of Care  Goal: Patient-Specific Goal (Individualized)  Outcome: Ongoing, Progressing     Problem: Adult Inpatient Plan of Care  Goal: Absence of Hospital-Acquired Illness or Injury  Outcome: Ongoing, Progressing     Problem: Bleeding (Sepsis/Septic Shock)  Goal: Absence of Bleeding  Outcome: Ongoing, Progressing     Problem: Glycemic Control Impaired (Sepsis/Septic Shock)  Goal: Blood Glucose Level Within Desired Range  Outcome: Ongoing, Progressing     Problem: Infection Progression (Sepsis/Septic Shock)  Goal: Absence of Infection Signs and Symptoms  Outcome: Ongoing, Progressing     Problem: Nutrition Impaired (Sepsis/Septic Shock)  Goal: Optimal Nutrition Intake  Outcome: Ongoing, Progressing     Problem: Skin Injury Risk Increased  Goal: Skin Health and Integrity  Outcome: Ongoing, Progressing     Problem: Fall Injury Risk  Goal: Absence of Fall and Fall-Related Injury  Outcome: Ongoing, Progressing

## 2023-01-14 NOTE — ASSESSMENT & PLAN NOTE
Check xray of back due to pain, increase in scoliosis.   1/10 given proximal muscle weakness with slow improvement with daily PT and OT, will follow up MRI brain and spine  Limitations with facility's imagining capabilities, cannot rule out cord compression over thoracic and lumbar spine.   [facility] accepted return of patient to their service for further evaluation. Currently on hospital wide diversion.

## 2023-01-14 NOTE — ASSESSMENT & PLAN NOTE
Labs noted, stable.  AST   Date Value Ref Range Status   01/14/2023 58 (H) 10 - 40 U/L Final   01/13/2023 45 (H) 10 - 40 U/L Final   01/12/2023 46 (H) 10 - 40 U/L Final   01/11/2023 45 (H) 10 - 40 U/L Final   01/11/2023 45 (H) 10 - 40 U/L Final     ALT   Date Value Ref Range Status   01/14/2023 28 10 - 44 U/L Final   01/13/2023 23 10 - 44 U/L Final   01/12/2023 23 10 - 44 U/L Final   01/11/2023 20 10 - 44 U/L Final   01/11/2023 20 10 - 44 U/L Final        Alkaline Phosphatase   Date Value Ref Range Status   01/14/2023 137 (H) 55 - 135 U/L Final   01/13/2023 127 55 - 135 U/L Final   01/12/2023 132 55 - 135 U/L Final   01/11/2023 131 55 - 135 U/L Final   01/11/2023 131 55 - 135 U/L Final

## 2023-01-14 NOTE — SUBJECTIVE & OBJECTIVE
Interval History: Patient seen and examined.     Review of Systems   Constitutional:  Negative for activity change, appetite change, chills, diaphoresis, fatigue and fever.   HENT:  Negative for sore throat.    Eyes:  Negative for pain.   Respiratory:  Negative for cough, choking, chest tightness and shortness of breath.    Cardiovascular:  Negative for chest pain, palpitations and leg swelling.   Gastrointestinal:  Negative for abdominal distention, abdominal pain, constipation, diarrhea, nausea, rectal pain and vomiting.   Genitourinary:  Negative for dyspareunia, dysuria, flank pain, frequency, genital sores, hematuria and menstrual problem.   Musculoskeletal:  Positive for arthralgias, back pain, gait problem, myalgias and neck pain. Negative for neck stiffness.   Skin:  Negative for pallor, rash and wound.   Neurological:  Positive for weakness. Negative for dizziness, tremors, speech difficulty and headaches.   Psychiatric/Behavioral:  Negative for agitation, behavioral problems, confusion, decreased concentration and dysphoric mood.    Objective:     Vital Signs (Most Recent):  Temp: 97.7 °F (36.5 °C) (01/13/23 1930)  Pulse: 95 (01/13/23 1930)  Resp: 20 (01/13/23 1930)  BP: 131/62 (01/13/23 1930)  SpO2: 96 % (01/13/23 1930) Vital Signs (24h Range):  Temp:  [97.5 °F (36.4 °C)-98.4 °F (36.9 °C)] 97.7 °F (36.5 °C)  Pulse:  [] 95  Resp:  [18-22] 20  SpO2:  [96 %-100 %] 96 %  BP: (117-145)/(60-75) 131/62     Weight: 97.2 kg (214 lb 3.2 oz)  Body mass index is 33.55 kg/m².    Intake/Output Summary (Last 24 hours) at 1/13/2023 1937  Last data filed at 1/13/2023 1808  Gross per 24 hour   Intake 1520 ml   Output 800 ml   Net 720 ml      Physical Exam  Vitals and nursing note reviewed.   Constitutional:       General: She is not in acute distress.     Appearance: She is obese.   HENT:      Head: Normocephalic and atraumatic.      Right Ear: External ear normal.      Left Ear: External ear normal.      Nose: Nose  normal. No congestion or rhinorrhea.      Mouth/Throat:      Mouth: Mucous membranes are dry.      Pharynx: No oropharyngeal exudate.   Eyes:      General: No scleral icterus.     Extraocular Movements: Extraocular movements intact.      Conjunctiva/sclera: Conjunctivae normal.   Neck:      Vascular: No carotid bruit.      Comments: Limited ROM at baseline  Cardiovascular:      Rate and Rhythm: Normal rate and regular rhythm.      Heart sounds: Murmur heard.     No friction rub. No gallop.   Pulmonary:      Effort: No respiratory distress.      Breath sounds: No stridor. No wheezing, rhonchi or rales.   Chest:      Chest wall: No tenderness.   Abdominal:      General: There is no distension.      Palpations: Abdomen is soft. There is no mass.      Tenderness: There is no abdominal tenderness. There is no right CVA tenderness, left CVA tenderness or guarding.   Musculoskeletal:         General: No swelling, tenderness or deformity.      Cervical back: Normal range of motion and neck supple. No rigidity or tenderness.      Right lower leg: No edema.      Left lower leg: No edema.   Lymphadenopathy:      Cervical: No cervical adenopathy.   Skin:     General: Skin is dry.      Capillary Refill: Capillary refill takes less than 2 seconds.      Coloration: Skin is not jaundiced or pale.      Findings: No bruising or rash.   Neurological:      Mental Status: She is oriented to person, place, and time. Mental status is at baseline.      Cranial Nerves: No cranial nerve deficit.      Sensory: Sensory deficit present.      Motor: Weakness (unable to lift legs against gravity, also unable to plant feet with hips and knees flexed bilaterally in supine, weakness more prominent on left side, no SI joint tenderness) present.      Comments: Sensation diminished in bilateral legs    Psychiatric:         Mood and Affect: Mood normal.         Behavior: Behavior normal.         Thought Content: Thought content normal.      Comments:  Calm and cooperative     Significant Labs: All pertinent labs within the past 24 hours have been reviewed.  BMP:   Recent Labs   Lab 01/13/23  0529   *      K 3.8      CO2 27   BUN 13   CREATININE 0.4*   CALCIUM 7.8*   MG 1.9     CBC:   Recent Labs   Lab 01/12/23 0523 01/13/23 0529   WBC 2.28* 1.71*   HGB 9.5* 8.2*   HCT 29.8* 26.0*   PLT 96* 88*     CMP:   Recent Labs   Lab 01/12/23 0523 01/13/23  0529    140   K 3.9 3.8    108   CO2 27 27    124*   BUN 13 13   CREATININE 0.5 0.4*   CALCIUM 7.8* 7.8*   PROT 7.5 6.6   ALBUMIN 1.8* 1.7*   BILITOT 0.9 0.9   ALKPHOS 132 127   AST 46* 45*   ALT 23 23   ANIONGAP 4* 5*     Magnesium:   Recent Labs   Lab 01/12/23 0523 01/13/23 0529   MG 2.0 1.9     Significant Imaging: I have reviewed all pertinent imaging results/findings within the past 24 hours.

## 2023-01-14 NOTE — PROGRESS NOTES
"[facility] Medicine  Progress Note    Patient Name: Rachel Zazueta  MRN: [id number]  Patient Class: IP- Inpatient   Admission Date: 12/23/2022  Length of Stay: 21 days  Attending Physician: Dannielle Merino DO  Primary Care Provider: Dannielle Merino DO    Subjective:     Principal Problem:Debility        HPI:  ER HPI:  42-year-old female with a history of anemia, anxiety, recurrent cellulitis, hepatitis C, IVDU, liver cirrhosis, cholecystectomy, kidney stones, lumbar fusion, shoulder surgery, chronic back pain comes in c/o generalized weakness, fatigue, and bilateral flank pain.  She reports that this has been going on intermittently for several days and was diagnosed with a UTI recently.  No fever.  No abdominal pain or vomiting or diarrhea.     IM HPI:  Patient is well known to our service.  Patient has a history of IV drug abuse and unfortunately has relapsed.  Patient has a history of a epidural abscess requiring a large lumbar surgery with multiple rehabilitative stays and therapy.  The patient was progressing to ambulation and recently has declined.  Patient admits to starting drugs including IV drug use again.  Patient presented to the ED with the above symptoms and she has a noticeable cardiac murmur today that we are getting a stat echocardiogram.  Patient's been started on antibiotics fluid resuscitated and seems to have sepsis.  Patient's urinary studies were abnormal but not overwhelming.      Overview/Hospital Course:  12/24/22 CG: Patient remains in the ICU today. Has a lot of muscle spasms and low back pain. Echocardiogram ordered yesterday and completed; significant for pulm HTN but without vegetations.   12/25/22 CG:  Overnight she was having a lot of significant discomfort and pain.  We placed her on Precedex and this has helped significantly with her body aches, her irritability and muscle spasms."  12/26/22 FM:  Patient required Precedex for agitation and mood " changes.  Patient is calm and cooperative this morning.  Patient appears to have E coli sepsis so will taper antibiotics.  Patient's echocardiogram showed no vegetation and E coli would be low risk for metastatic infection.  Will transfer the patient to the floor once her Precedex discontinue we counseled her again today on the importance of avoiding substances and illicit drugs.  This continues to be setback for her.  12/27/22 WC:  Patient overall remains stable and is slowly improving.  Urine culture has revealed Candida albicans in addition to blood culture revealing E coli.  12/28/22 WC:  Patient resting comfortably this morning.  Pharm ID on case for treatment duration recs.    12/29/22 WC:  no acute events overnight.  Back pain at this time.   12/20/22:  no acute events continue IV ABX for esbl  12/31/22 CG: stable, continuing antibiotics.   1/1/2 CG: stable, continuing antibiotics. Has not had a bowel movement in two weeks. Will give lactulose.    01/02/2023: Patient did have a bowel movement yesterday.  Completing last day of antimicrobial therapy anticipated discharge tomorrow.  1/3/2023: Improving BM with lactulose. Lower extremity weakness requiring aggressive PT and OT efforts. Patient understands she needs to be an active participant. Encouraged inbed physical exercises without assistance with therapists. Stable Hg/HCT.   1/4/2023: Lactulose every other day for regular bowel movement. PT and OT recommendations with continue efforts to help with lower extremity weakness which likely precipitated from minimal participation from patient over the past week to remain physically active. Hg/HCT remains stable. Last blood Cx x2 (12/28) NGTD x5 days. Patient agrees moving forward lower extremity strengthening will have to take place with her participation and motivation. Will discharge with continue PT and OT efforts outpatient. She acknowledges outpatient follow up with GI/hepatologist, hematologist and  neurologist required to address all other chronic medication conditions.   1/5/2023: Overnight report from nursing that patient exhibited weakness in lower extremities and in upper body strength to support self to safely transfer from bed to commode or even bed to wheelchair. CM involved in placement to SNF. Continue with PT and OT. All other medical condition addressed during hospitalization remains stable.  1/6/2023: CM working on SNF placement. Patient expresses continue effort on her part to regain her strength, reassured her that she has been able to participate following the leads of PT and OT and so a new injury is less likely at this time. While small, she endorsed sitting up at bedside with legs dangle.    1/7/23 ND patient feeling well this morning no complaints will continue therapy services and rehab placement  1/8/23 ND pt reports some back pain will repeat x-rays to make sure everything looks okay from previous surgery.  Otherwise she is doing well  1/9/23 Repeat xray showing increased kyphosis over TL junction. Today, pain associated with massaging feet bilaterally with no worsening of numbness and tingling. No reported concerns with PT and OT efforts. Awaiting placement.  1/10/23 Overnight elevated temperature >101F, no further discomfort at this time and on morning exam denies further symptoms and worsening pain over joints and spine. PT and OT efforts moving forward, but still has significant muscle weakness proximally and over her core. White cells reduced, CRP elevated. Will obtain MRI scan brain and spine to follow up with worsening scoliosis, identify occult findings.    1/11/23 Second evening with elevated temp >101F with chills and tachycardia. Sepsis precautions taken with labs and cultures. No obvious infectious source, but empirically started merrem for neutropenic fever precautions. Patient this morning endorses good appetite and hopeful in being able to sit up longer today. Yesterday,  she was able to stay seated 10 minutes from 3 minutes, the day prior. Pancytopenia profile back to patient's baseline. Overall pain stable and no reported worsening. Lower extremity weakness with loss of flexion at knees and hips against gravity still prominent. Will appreciate neurology consult. Continue PT and OT.   1/12/23 Neurology consult for paraparesis, differential diagnoses include demyelinating disease, ischemic changes (MRI white matter changes), spinal cord lesions (pt with history of past epidural abscesses), compressive myelopathy.   - MRI brain with white matter changes that are concerning for demyelinating disease vs ischemic change  -Thoracic spine radiographs, three views, demonstrate multilevel thoracolumbar fusion with local kyphosis at T9-10, increased when compared to scoliosis series of 04/14/2022, measuring approximately 40 degree on today's exam and 30° on prior exam  Current facility with CT imagining limitations to further determine nature of spinal compression likely affecting bilateral lower extremity, hyperreflexia and reported altered sensation to feet. Pulses intact bilaterally.   Currently awaiting diversion at main [facility] to be lifted for transfer to neurosurgery service with further neurology work up.   No fever overnight. Blood cultures NGTD x 2 days. Will continue merrem for another 24 hours, then discontinue if no elevated temperature reading.   1/13/23 No fever overnight, WBC dropped again. NGTD x3 days on all blood cultures collected after febrile episodes x2, will discontinue merrem and resume suppressive amoxicillin Q12H secondary to partial hardware retention and hx of GBS. Early PM started to complain of  muscle spasms over her entire back that would release with breathing. Tizanidine started and pain control improved. Awaiting transfer to neurosurgery service for advanced imagining and neurology consult.       Interval History: Patient seen and examined.     Review of  Systems   Constitutional:  Negative for activity change, appetite change, chills, diaphoresis, fatigue and fever.   HENT:  Negative for sore throat.    Eyes:  Negative for pain.   Respiratory:  Negative for cough, choking, chest tightness and shortness of breath.    Cardiovascular:  Negative for chest pain, palpitations and leg swelling.   Gastrointestinal:  Negative for abdominal distention, abdominal pain, constipation, diarrhea, nausea, rectal pain and vomiting.   Genitourinary:  Negative for dyspareunia, dysuria, flank pain, frequency, genital sores, hematuria and menstrual problem.   Musculoskeletal:  Positive for arthralgias, back pain, gait problem, myalgias and neck pain. Negative for neck stiffness.   Skin:  Negative for pallor, rash and wound.   Neurological:  Positive for weakness. Negative for dizziness, tremors, speech difficulty and headaches.   Psychiatric/Behavioral:  Negative for agitation, behavioral problems, confusion, decreased concentration and dysphoric mood.    Objective:     Vital Signs (Most Recent):  Temp: 97.7 °F (36.5 °C) (01/13/23 1930)  Pulse: 95 (01/13/23 1930)  Resp: 20 (01/13/23 1930)  BP: 131/62 (01/13/23 1930)  SpO2: 96 % (01/13/23 1930) Vital Signs (24h Range):  Temp:  [97.5 °F (36.4 °C)-98.4 °F (36.9 °C)] 97.7 °F (36.5 °C)  Pulse:  [] 95  Resp:  [18-22] 20  SpO2:  [96 %-100 %] 96 %  BP: (117-145)/(60-75) 131/62     Weight: 97.2 kg (214 lb 3.2 oz)  Body mass index is 33.55 kg/m².    Intake/Output Summary (Last 24 hours) at 1/13/2023 1937  Last data filed at 1/13/2023 1808  Gross per 24 hour   Intake 1520 ml   Output 800 ml   Net 720 ml      Physical Exam  Vitals and nursing note reviewed.   Constitutional:       General: She is not in acute distress.     Appearance: She is obese.   HENT:      Head: Normocephalic and atraumatic.      Right Ear: External ear normal.      Left Ear: External ear normal.      Nose: Nose normal. No congestion or rhinorrhea.      Mouth/Throat:       Mouth: Mucous membranes are dry.      Pharynx: No oropharyngeal exudate.   Eyes:      General: No scleral icterus.     Extraocular Movements: Extraocular movements intact.      Conjunctiva/sclera: Conjunctivae normal.   Neck:      Vascular: No carotid bruit.      Comments: Limited ROM at baseline  Cardiovascular:      Rate and Rhythm: Normal rate and regular rhythm.      Heart sounds: Murmur heard.     No friction rub. No gallop.   Pulmonary:      Effort: No respiratory distress.      Breath sounds: No stridor. No wheezing, rhonchi or rales.   Chest:      Chest wall: No tenderness.   Abdominal:      General: There is no distension.      Palpations: Abdomen is soft. There is no mass.      Tenderness: There is no abdominal tenderness. There is no right CVA tenderness, left CVA tenderness or guarding.   Musculoskeletal:         General: No swelling, tenderness or deformity.      Cervical back: Normal range of motion and neck supple. No rigidity or tenderness.      Right lower leg: No edema.      Left lower leg: No edema.   Lymphadenopathy:      Cervical: No cervical adenopathy.   Skin:     General: Skin is dry.      Capillary Refill: Capillary refill takes less than 2 seconds.      Coloration: Skin is not jaundiced or pale.      Findings: No bruising or rash.   Neurological:      Mental Status: She is oriented to person, place, and time. Mental status is at baseline.      Cranial Nerves: No cranial nerve deficit.      Sensory: Sensory deficit present.      Motor: Weakness (unable to lift legs against gravity, also unable to plant feet with hips and knees flexed bilaterally in supine, weakness more prominent on left side, no SI joint tenderness) present.      Comments: Sensation diminished in bilateral legs    Psychiatric:         Mood and Affect: Mood normal.         Behavior: Behavior normal.         Thought Content: Thought content normal.      Comments: Calm and cooperative     Significant Labs: All pertinent labs  within the past 24 hours have been reviewed.  BMP:   Recent Labs   Lab 01/13/23  0529   *      K 3.8      CO2 27   BUN 13   CREATININE 0.4*   CALCIUM 7.8*   MG 1.9     CBC:   Recent Labs   Lab 01/12/23 0523 01/13/23  0529   WBC 2.28* 1.71*   HGB 9.5* 8.2*   HCT 29.8* 26.0*   PLT 96* 88*     CMP:   Recent Labs   Lab 01/12/23 0523 01/13/23  0529    140   K 3.9 3.8    108   CO2 27 27    124*   BUN 13 13   CREATININE 0.5 0.4*   CALCIUM 7.8* 7.8*   PROT 7.5 6.6   ALBUMIN 1.8* 1.7*   BILITOT 0.9 0.9   ALKPHOS 132 127   AST 46* 45*   ALT 23 23   ANIONGAP 4* 5*     Magnesium:   Recent Labs   Lab 01/12/23 0523 01/13/23 0529   MG 2.0 1.9     Significant Imaging: I have reviewed all pertinent imaging results/findings within the past 24 hours.      Assessment/Plan:      * Debility  Lower extremity weakness bilaterally now requiring aggressive OT and PT efforts to prevent further weakness and also regain strength necessary to carry out ADLs.   - From early bed bound state, patient has required multiple nursing staff in safely getting in and out of bed   - Original discharge plans with outpatient rehab will cause more harm to patient at this time and case management working to find a placement at SNF with PT and OT efforts  1/9 continue efforts with PT and OT, awaiting placement  1/10 slow progress with PT and OT, will obtain MRI brain and spine to identify any concerning changes  1/11 MRI limitations to TL spine study secondary to hardware, brain and cervical scan noting no acute findings;   1. No acute intracranial abnormality identified.  2. Nonspecific gliotic white matter signal change within bilateral cerebral hemispheres similar in extent and distribution greater than expected for patient age. This may be related to chronic small vessel ischemia.  Numerous chronic systemic illnesses can result in this appearance. Demyelinating disease is also in the differential.  Multiple  prolonged hospitalization, neurosurgical intervention, chronic inflammation, substance abuse all likely contributing to current state. Outpatient follow up with specialists have been interrupted with hospitalizations and inconclusive studies. Will follow up with neurology for evaluation and optimize current therapy.    1/13 Awaiting main campus diversion to be lifted for neurosurgery transfer    Anxiety and depression  Patient has recurrent depression which is moderate and is currently controlled. Will Continue anti-depressant medications. We will consult psychiatry at this time. Patient does not display psychosis at this time. Continue to monitor closely and adjust plan of care as needed.  1/12 bupropion increase to 300 mg daily, gabapentin increase to 400 mg TID            Pulmonary hypertension  Found incidentally on echocardiogram with evidence of elevated right sided pressures and dilated heart chambers on the right side. Unclear the exact etiology or class of Pulm HTN but given her significant substance abuse Class 1 certainly is a possibility.   - Will refer to outpatient pulmonologist  - Will consider a cariology consult while inpatient to see if they think it would be beneficial to have a heart cath done while inpatient      Severe sepsis  This patient does have evidence of infective focus  My overall impression is sepsis. Vital signs were reviewed and noted in progress note.  Antibiotics given-   Antibiotics (From admission, onward)    Start     Stop Route Frequency Ordered    01/14/23 0900  amoxicillin capsule 1,000 mg         -- Oral Every 12 hours 01/13/23 1947    01/02/23 1515  meropenem (MERREM) 1 g in sodium chloride 0.9 % 100 mL IVPB (MB+)        See Hyperspace for full Linked Orders Report.    01/03 0714 IV Every 8 hours (non-standard times) 01/02/23 0923    12/23/22 0533  vancomycin (VANCOCIN) 1,000 mg injection        Note to Pharmacy: Created by cabinet override    12/23 6113   12/23/22 0542     12/23/22 0405  piperacillin-tazobactam (ZOSYN) 4.5 gram injection        Note to Pharmacy: Created by cabinet override    12/23 1614   12/23/22 0405        Cultures were taken-   Microbiology Results (last 7 days)     Procedure Component Value Units Date/Time    Blood culture [[id number]] Collected: 01/10/23 2348    Order Status: Completed Specimen: Blood Updated: 01/13/23 0613     Blood Culture, Routine No Growth to date      No Growth to date      No Growth to date    Blood culture [[id number]] Collected: 01/10/23 2344    Order Status: Completed Specimen: Blood Updated: 01/13/23 0613     Blood Culture, Routine No Growth to date      No Growth to date      No Growth to date    Blood culture [[id number]] Collected: 01/10/23 0616    Order Status: Completed Specimen: Blood Updated: 01/12/23 2012     Blood Culture, Routine No growth to date      No Growth to date      No Growth to date    Blood culture [[id number]] Collected: 01/10/23 0621    Order Status: Completed Specimen: Blood Updated: 01/12/23 2012     Blood Culture, Routine No growth to date      No Growth to date      No Growth to date    Culture, Respiratory with Gram Stain [[id number]]     Order Status: No result Specimen: Respiratory         Latest lactate reviewed, they are-  Recent Labs   Lab 01/10/23  2350   LACTATE 2.0       Organ dysfunction indicated by Acute kidney injury, Encephalopathy  and Acute liver injury  Source- ?    Source control Achieved by- see orders  - Merrem day 7 of 7 (completed on 1/3/23)  - Blood cx x2 (1/2/23) final report no growth    1/7 resolved  1/10 overnight elevated temperature reading, no worsening overall pain. Follow up Blood cultures and imagining.   1/11 second evening with elevated temperature >101F with symptoms, started Merrem  1/13 48 hours of defervescence, NGTD x 3 days on blood cultures (1/10 and 1/11), will discontinue Merrem. Per discussion with Pharm ID, will resume suppressive therapy with amoxicillin 1g BID  starting 1/14        Murmur, cardiac  No evidence of vegetations.     Mild episode of recurrent major depressive disorder  Resume home meds.   - lyrica discontinued  1/12 gabapentin increased to 400 mg TID      Cannabis use disorder, moderate, dependence  As above.      Amphetamine use disorder, severe  Continue to  educate and support.      Spinal stenosis at L4-L5 level  Check xray of back due to pain, increase in scoliosis.   1/10 given proximal muscle weakness with slow improvement with daily PT and OT, will follow up MRI brain and spine  Limitations with facility's imagining capabilities, cannot rule out cord compression over thoracic and lumbar spine.   [facility] neurosurgery has accepted return of patient to their service for further evaluation. Currently on hospital wide diversion.     Substance use disorder  Cessation advised. Follow up consult psychiatry.     Chronic hepatitis C with cirrhosis  Labs noted, will require follow up outpatient with GI to start treatment.       Cirrhosis of liver without ascites  Labs noted, stable.  AST   Date Value Ref Range Status   01/13/2023 45 (H) 10 - 40 U/L Final   01/12/2023 46 (H) 10 - 40 U/L Final   01/11/2023 45 (H) 10 - 40 U/L Final   01/11/2023 45 (H) 10 - 40 U/L Final   01/10/2023 47 (H) 10 - 40 U/L Final     ALT   Date Value Ref Range Status   01/13/2023 23 10 - 44 U/L Final   01/12/2023 23 10 - 44 U/L Final   01/11/2023 20 10 - 44 U/L Final   01/11/2023 20 10 - 44 U/L Final   01/10/2023 23 10 - 44 U/L Final        Alkaline Phosphatase   Date Value Ref Range Status   01/13/2023 127 55 - 135 U/L Final   01/12/2023 132 55 - 135 U/L Final   01/11/2023 131 55 - 135 U/L Final   01/11/2023 131 55 - 135 U/L Final   01/10/2023 123 55 - 135 U/L Final            Pancytopenia  Counts are stable at this time.  Two days of leukocyte count dropping 2.39 to 1.76 and 1.42.   Overnight febrile event >101F, patient however feels better compared to yesterday evening.    1/11: Morning, vital signs stable, no tachycardia, BP normotensive.   - f/u blood cultures, NGTD x 1 day  - resumed empiric antibiotics, merrem after two days of elevated temperature >101F and downtrending ANC (900/ul)  1/13 drop in ANC to 900/ul, patient without symptoms, vital signs stable        VTE Risk Mitigation (From admission, onward)         Ordered     IP VTE HIGH RISK PATIENT  Once         12/23/22 0647     Place sequential compression device  Until discontinued         12/23/22 0647                Discharge Planning   SHIRA:      Code Status: Full Code   Is the patient medically ready for discharge?:     Reason for patient still in hospital (select all that apply): Patient trending condition, Treatment, Consult recommendations, PT / OT recommendations and Pending disposition  Discharge Plan A: Skilled Nursing Facility   Discharge Delays: None known at this time              Dannielle Merino DO  Department of Hospital Medicine   [facility] Surg

## 2023-01-14 NOTE — ASSESSMENT & PLAN NOTE
Counts are stable at this time.  Two days of leukocyte count dropping 2.39 to 1.76 and 1.42.   Overnight febrile event >101F, patient however feels better compared to yesterday evening.   1/11: Morning, vital signs stable, no tachycardia, BP normotensive.   - f/u blood cultures, NGTD x 1 day  - resumed empiric antibiotics, merrem after two days of elevated temperature >101F and downtrending ANC (900/ul)  1/13 drop in ANC to 900/ul, patient without symptoms, vital signs stable    Patient has a leukocytosis.  Last trend as follows-   Lab Results   Component Value Date    WBC 2.54 (L) 01/14/2023    WBC 1.71 (LL) 01/13/2023    WBC 2.28 (L) 01/12/2023     Recent Labs   Lab 01/12/23  0523 01/13/23  0529 01/14/23  0615   Hemoglobin 9.5 L 8.2 L 8.6 L

## 2023-01-14 NOTE — ASSESSMENT & PLAN NOTE
Patient has recurrent depression which is moderate and is currently controlled. Will Continue anti-depressant medications. We will consult psychiatry at this time. Patient does not display psychosis at this time. Continue to monitor closely and adjust plan of care as needed.  1/12 bupropion increase to 300 mg daily, gabapentin increase to 400 mg TID

## 2023-01-14 NOTE — SUBJECTIVE & OBJECTIVE
Interval History: Patient seen and examined.     Review of Systems   Constitutional:  Negative for activity change, appetite change, chills, diaphoresis, fatigue and fever.   HENT:  Negative for sore throat.    Eyes:  Negative for pain.   Respiratory:  Negative for cough, choking, chest tightness and shortness of breath.    Cardiovascular:  Negative for chest pain, palpitations and leg swelling.   Gastrointestinal:  Negative for abdominal distention, abdominal pain, constipation, diarrhea, nausea, rectal pain and vomiting.   Genitourinary:  Negative for dyspareunia, dysuria, flank pain, frequency, genital sores, hematuria and menstrual problem.   Musculoskeletal:  Positive for arthralgias, back pain, gait problem, myalgias and neck pain. Negative for neck stiffness.   Skin:  Negative for pallor, rash and wound.   Neurological:  Positive for weakness. Negative for dizziness, tremors, speech difficulty and headaches.   Psychiatric/Behavioral:  Negative for agitation, behavioral problems, confusion, decreased concentration and dysphoric mood.    Objective:     Vital Signs (Most Recent):  Temp: 97.9 °F (36.6 °C) (01/14/23 0725)  Pulse: 80 (01/14/23 0725)  Resp: 18 (01/14/23 0725)  BP: (!) 101/59 (01/14/23 0725)  SpO2: 95 % (01/14/23 0725) Vital Signs (24h Range):  Temp:  [97.5 °F (36.4 °C)-97.9 °F (36.6 °C)] 97.9 °F (36.6 °C)  Pulse:  [80-99] 80  Resp:  [16-22] 18  SpO2:  [95 %-99 %] 95 %  BP: ()/(58-72) 101/59     Weight: 114.1 kg (251 lb 8 oz)  Body mass index is 39.39 kg/m².    Intake/Output Summary (Last 24 hours) at 1/14/2023 1042  Last data filed at 1/14/2023 0500  Gross per 24 hour   Intake 2080 ml   Output 1100 ml   Net 980 ml        Physical Exam  Vitals and nursing note reviewed.   Constitutional:       General: She is not in acute distress.     Appearance: She is obese.   HENT:      Head: Normocephalic and atraumatic.      Right Ear: External ear normal.      Left Ear: External ear normal.      Nose:  Nose normal. No congestion or rhinorrhea.      Mouth/Throat:      Mouth: Mucous membranes are dry.      Pharynx: No oropharyngeal exudate.   Eyes:      General: No scleral icterus.     Extraocular Movements: Extraocular movements intact.      Conjunctiva/sclera: Conjunctivae normal.   Neck:      Vascular: No carotid bruit.      Comments: Limited ROM at baseline  Cardiovascular:      Rate and Rhythm: Normal rate and regular rhythm.      Heart sounds: Murmur heard.     No friction rub. No gallop.   Pulmonary:      Effort: No respiratory distress.      Breath sounds: No stridor. No wheezing, rhonchi or rales.   Chest:      Chest wall: No tenderness.   Abdominal:      General: There is no distension.      Palpations: Abdomen is soft. There is no mass.      Tenderness: There is no abdominal tenderness. There is no right CVA tenderness, left CVA tenderness or guarding.   Musculoskeletal:         General: No swelling, tenderness or deformity.      Cervical back: Normal range of motion and neck supple. No rigidity or tenderness.      Right lower leg: No edema.      Left lower leg: No edema.   Lymphadenopathy:      Cervical: No cervical adenopathy.   Skin:     General: Skin is dry.      Capillary Refill: Capillary refill takes less than 2 seconds.      Coloration: Skin is not jaundiced or pale.      Findings: No bruising or rash.   Neurological:      Mental Status: She is oriented to person, place, and time. Mental status is at baseline.      Cranial Nerves: No cranial nerve deficit.      Sensory: Sensory deficit present.      Motor: Weakness (unable to lift legs against gravity, also unable to plant feet with hips and knees flexed bilaterally in supine, weakness more prominent on left side, no SI joint tenderness) present.      Comments: Sensation diminished in bilateral legs   Positive babinski bilaterally.    Psychiatric:         Mood and Affect: Mood normal.         Behavior: Behavior normal.         Thought Content:  Thought content normal.      Comments: Calm and cooperative     Significant Labs: All pertinent labs within the past 24 hours have been reviewed.  BMP:   Recent Labs   Lab 01/14/23 0615         K 4.5      CO2 29   BUN 18   CREATININE 0.4*   CALCIUM 8.1*   MG 2.0       CBC:   Recent Labs   Lab 01/13/23  0529 01/14/23 0615   WBC 1.71* 2.54*   HGB 8.2* 8.6*   HCT 26.0* 27.0*   PLT 88* 104*       CMP:   Recent Labs   Lab 01/13/23  0529 01/14/23  0615    138   K 3.8 4.5    106   CO2 27 29   * 110   BUN 13 18   CREATININE 0.4* 0.4*   CALCIUM 7.8* 8.1*   PROT 6.6 6.9   ALBUMIN 1.7* 1.8*   BILITOT 0.9 1.1*   ALKPHOS 127 137*   AST 45* 58*   ALT 23 28   ANIONGAP 5* 3*       Magnesium:   Recent Labs   Lab 01/13/23  0529 01/14/23 0615   MG 1.9 2.0       Significant Imaging: I have reviewed all pertinent imaging results/findings within the past 24 hours.

## 2023-01-14 NOTE — PROGRESS NOTES
"[facility] Medicine  Progress Note    Patient Name: Rachel Zazueta  MRN: [id number]  Patient Class: IP- Inpatient   Admission Date: 12/23/2022  Length of Stay: 22 days  Attending Physician: Dannielle Merino DO  Primary Care Provider: Dannielle Merino DO        Subjective:     Principal Problem:Debility        HPI:  ER HPI:  42-year-old female with a history of anemia, anxiety, recurrent cellulitis, hepatitis C, IVDU, liver cirrhosis, cholecystectomy, kidney stones, lumbar fusion, shoulder surgery, chronic back pain comes in c/o generalized weakness, fatigue, and bilateral flank pain.  She reports that this has been going on intermittently for several days and was diagnosed with a UTI recently.  No fever.  No abdominal pain or vomiting or diarrhea.     IM HPI:  Patient is well known to our service.  Patient has a history of IV drug abuse and unfortunately has relapsed.  Patient has a history of a epidural abscess requiring a large lumbar surgery with multiple rehabilitative stays and therapy.  The patient was progressing to ambulation and recently has declined.  Patient admits to starting drugs including IV drug use again.  Patient presented to the ED with the above symptoms and she has a noticeable cardiac murmur today that we are getting a stat echocardiogram.  Patient's been started on antibiotics fluid resuscitated and seems to have sepsis.  Patient's urinary studies were abnormal but not overwhelming.      Overview/Hospital Course:  12/24/22 CG: Patient remains in the ICU today. Has a lot of muscle spasms and low back pain. Echocardiogram ordered yesterday and completed; significant for pulm HTN but without vegetations.   12/25/22 CG:  Overnight she was having a lot of significant discomfort and pain.  We placed her on Precedex and this has helped significantly with her body aches, her irritability and muscle spasms."  12/26/22 FM:  Patient required Precedex for agitation and mood " changes.  Patient is calm and cooperative this morning.  Patient appears to have E coli sepsis so will taper antibiotics.  Patient's echocardiogram showed no vegetation and E coli would be low risk for metastatic infection.  Will transfer the patient to the floor once her Precedex discontinue we counseled her again today on the importance of avoiding substances and illicit drugs.  This continues to be setback for her.  12/27/22 WC:  Patient overall remains stable and is slowly improving.  Urine culture has revealed Candida albicans in addition to blood culture revealing E coli.  12/28/22 WC:  Patient resting comfortably this morning.  Pharm ID on case for treatment duration recs.    12/29/22 WC:  no acute events overnight.  Back pain at this time.   12/20/22:  no acute events continue IV ABX for esbl  12/31/22 CG: stable, continuing antibiotics.   1/1/2 CG: stable, continuing antibiotics. Has not had a bowel movement in two weeks. Will give lactulose.    01/02/2023: Patient did have a bowel movement yesterday.  Completing last day of antimicrobial therapy anticipated discharge tomorrow.  1/3/2023: Improving BM with lactulose. Lower extremity weakness requiring aggressive PT and OT efforts. Patient understands she needs to be an active participant. Encouraged inbed physical exercises without assistance with therapists. Stable Hg/HCT.   1/4/2023: Lactulose every other day for regular bowel movement. PT and OT recommendations with continue efforts to help with lower extremity weakness which likely precipitated from minimal participation from patient over the past week to remain physically active. Hg/HCT remains stable. Last blood Cx x2 (12/28) NGTD x5 days. Patient agrees moving forward lower extremity strengthening will have to take place with her participation and motivation. Will discharge with continue PT and OT efforts outpatient. She acknowledges outpatient follow up with GI/hepatologist, hematologist and  neurologist required to address all other chronic medication conditions.   1/5/2023: Overnight report from nursing that patient exhibited weakness in lower extremities and in upper body strength to support self to safely transfer from bed to commode or even bed to wheelchair. CM involved in placement to SNF. Continue with PT and OT. All other medical condition addressed during hospitalization remains stable.  1/6/2023: CM working on SNF placement. Patient expresses continue effort on her part to regain her strength, reassured her that she has been able to participate following the leads of PT and OT and so a new injury is less likely at this time. While small, she endorsed sitting up at bedside with legs dangle.    1/7/23 ND patient feeling well this morning no complaints will continue therapy services and rehab placement  1/8/23 ND pt reports some back pain will repeat x-rays to make sure everything looks okay from previous surgery.  Otherwise she is doing well  1/9/23 Repeat xray showing increased kyphosis over TL junction. Today, pain associated with massaging feet bilaterally with no worsening of numbness and tingling. No reported concerns with PT and OT efforts. Awaiting placement.  1/10/23 Overnight elevated temperature >101F, no further discomfort at this time and on morning exam denies further symptoms and worsening pain over joints and spine. PT and OT efforts moving forward, but still has significant muscle weakness proximally and over her core. White cells reduced, CRP elevated. Will obtain MRI scan brain and spine to follow up with worsening scoliosis, identify occult findings.    1/11/23 Second evening with elevated temp >101F with chills and tachycardia. Sepsis precautions taken with labs and cultures. No obvious infectious source, but empirically started merrem for neutropenic fever precautions. Patient this morning endorses good appetite and hopeful in being able to sit up longer today. Yesterday,  she was able to stay seated 10 minutes from 3 minutes, the day prior. Pancytopenia profile back to patient's baseline. Overall pain stable and no reported worsening. Lower extremity weakness with loss of flexion at knees and hips against gravity still prominent. Will appreciate neurology consult. Continue PT and OT.   1/12/23 Neurology consult for paraparesis, differential diagnoses include demyelinating disease, ischemic changes (MRI white matter changes), spinal cord lesions (pt with history of past epidural abscesses), compressive myelopathy.   - MRI brain with white matter changes that are concerning for demyelinating disease vs ischemic change  -Thoracic spine radiographs, three views, demonstrate multilevel thoracolumbar fusion with local kyphosis at T9-10, increased when compared to scoliosis series of 04/14/2022, measuring approximately 40 degree on today's exam and 30° on prior exam  Current facility with CT imagining limitations to further determine nature of spinal compression likely affecting bilateral lower extremity, hyperreflexia and reported altered sensation to feet. Pulses intact bilaterally.   Currently awaiting diversion at main [place] to be lifted for transfer to neurosurgery service with further neurology work up.   No fever overnight. Blood cultures NGTD x 2 days. Will continue merrem for another 24 hours, then discontinue if no elevated temperature reading.   1/13/23 No fever overnight, WBC dropped again. NGTD x3 days on all blood cultures collected after febrile episodes x2, will discontinue merrem and resume suppressive amoxicillin Q12H secondary to partial hardware retention and hx of GBS. Early PM started to complain of  muscle spasms over her entire back that would release with breathing. Tizanidine started and pain control improved. Awaiting transfer to neurosurgery service for advanced imagining and neurology consult.   1/14/23 WC: patient afebrile overnight.  Blood cultures remain  negative.  Tolerating transition to suppressive abx therapy with amoxicillin secondary to partial hardware retention.  Endorses ongoing weakness to bilateral lower ext.  Positive babinski bilaterally.  Transfer for advanced imaging and neuro consult.       Interval History: Patient seen and examined.     Review of Systems   Constitutional:  Negative for activity change, appetite change, chills, diaphoresis, fatigue and fever.   HENT:  Negative for sore throat.    Eyes:  Negative for pain.   Respiratory:  Negative for cough, choking, chest tightness and shortness of breath.    Cardiovascular:  Negative for chest pain, palpitations and leg swelling.   Gastrointestinal:  Negative for abdominal distention, abdominal pain, constipation, diarrhea, nausea, rectal pain and vomiting.   Genitourinary:  Negative for dyspareunia, dysuria, flank pain, frequency, genital sores, hematuria and menstrual problem.   Musculoskeletal:  Positive for arthralgias, back pain, gait problem, myalgias and neck pain. Negative for neck stiffness.   Skin:  Negative for pallor, rash and wound.   Neurological:  Positive for weakness. Negative for dizziness, tremors, speech difficulty and headaches.   Psychiatric/Behavioral:  Negative for agitation, behavioral problems, confusion, decreased concentration and dysphoric mood.    Objective:     Vital Signs (Most Recent):  Temp: 97.9 °F (36.6 °C) (01/14/23 0725)  Pulse: 80 (01/14/23 0725)  Resp: 18 (01/14/23 0725)  BP: (!) 101/59 (01/14/23 0725)  SpO2: 95 % (01/14/23 0725) Vital Signs (24h Range):  Temp:  [97.5 °F (36.4 °C)-97.9 °F (36.6 °C)] 97.9 °F (36.6 °C)  Pulse:  [80-99] 80  Resp:  [16-22] 18  SpO2:  [95 %-99 %] 95 %  BP: ()/(58-72) 101/59     Weight: 114.1 kg (251 lb 8 oz)  Body mass index is 39.39 kg/m².    Intake/Output Summary (Last 24 hours) at 1/14/2023 1042  Last data filed at 1/14/2023 0500  Gross per 24 hour   Intake 2080 ml   Output 1100 ml   Net 980 ml        Physical  Exam  Vitals and nursing note reviewed.   Constitutional:       General: She is not in acute distress.     Appearance: She is obese.   HENT:      Head: Normocephalic and atraumatic.      Right Ear: External ear normal.      Left Ear: External ear normal.      Nose: Nose normal. No congestion or rhinorrhea.      Mouth/Throat:      Mouth: Mucous membranes are dry.      Pharynx: No oropharyngeal exudate.   Eyes:      General: No scleral icterus.     Extraocular Movements: Extraocular movements intact.      Conjunctiva/sclera: Conjunctivae normal.   Neck:      Vascular: No carotid bruit.      Comments: Limited ROM at baseline  Cardiovascular:      Rate and Rhythm: Normal rate and regular rhythm.      Heart sounds: Murmur heard.     No friction rub. No gallop.   Pulmonary:      Effort: No respiratory distress.      Breath sounds: No stridor. No wheezing, rhonchi or rales.   Chest:      Chest wall: No tenderness.   Abdominal:      General: There is no distension.      Palpations: Abdomen is soft. There is no mass.      Tenderness: There is no abdominal tenderness. There is no right CVA tenderness, left CVA tenderness or guarding.   Musculoskeletal:         General: No swelling, tenderness or deformity.      Cervical back: Normal range of motion and neck supple. No rigidity or tenderness.      Right lower leg: No edema.      Left lower leg: No edema.   Lymphadenopathy:      Cervical: No cervical adenopathy.   Skin:     General: Skin is dry.      Capillary Refill: Capillary refill takes less than 2 seconds.      Coloration: Skin is not jaundiced or pale.      Findings: No bruising or rash.   Neurological:      Mental Status: She is oriented to person, place, and time. Mental status is at baseline.      Cranial Nerves: No cranial nerve deficit.      Sensory: Sensory deficit present.      Motor: Weakness (unable to lift legs against gravity, also unable to plant feet with hips and knees flexed bilaterally in supine, weakness  more prominent on left side, no SI joint tenderness) present.      Comments: Sensation diminished in bilateral legs   Positive babinski bilaterally.    Psychiatric:         Mood and Affect: Mood normal.         Behavior: Behavior normal.         Thought Content: Thought content normal.      Comments: Calm and cooperative     Significant Labs: All pertinent labs within the past 24 hours have been reviewed.  BMP:   Recent Labs   Lab 01/14/23 0615         K 4.5      CO2 29   BUN 18   CREATININE 0.4*   CALCIUM 8.1*   MG 2.0       CBC:   Recent Labs   Lab 01/13/23 0529 01/14/23 0615   WBC 1.71* 2.54*   HGB 8.2* 8.6*   HCT 26.0* 27.0*   PLT 88* 104*       CMP:   Recent Labs   Lab 01/13/23 0529 01/14/23 0615    138   K 3.8 4.5    106   CO2 27 29   * 110   BUN 13 18   CREATININE 0.4* 0.4*   CALCIUM 7.8* 8.1*   PROT 6.6 6.9   ALBUMIN 1.7* 1.8*   BILITOT 0.9 1.1*   ALKPHOS 127 137*   AST 45* 58*   ALT 23 28   ANIONGAP 5* 3*       Magnesium:   Recent Labs   Lab 01/13/23  0529 01/14/23 0615   MG 1.9 2.0       Significant Imaging: I have reviewed all pertinent imaging results/findings within the past 24 hours.      Assessment/Plan:      * Debility  Lower extremity weakness bilaterally now requiring aggressive OT and PT efforts to prevent further weakness and also regain strength necessary to carry out ADLs.   - From early bed bound state, patient has required multiple nursing staff in safely getting in and out of bed   - Original discharge plans with outpatient rehab will cause more harm to patient at this time and case management working to find a placement at SNF with PT and OT efforts  1/9 continue efforts with PT and OT, awaiting placement  1/10 slow progress with PT and OT, will obtain MRI brain and spine to identify any concerning changes  1/11 MRI limitations to TL spine study secondary to hardware, brain and cervical scan noting no acute findings;   1. No acute intracranial  abnormality identified.  2. Nonspecific gliotic white matter signal change within bilateral cerebral hemispheres similar in extent and distribution greater than expected for patient age. This may be related to chronic small vessel ischemia.  Numerous chronic systemic illnesses can result in this appearance. Demyelinating disease is also in the differential.  Multiple prolonged hospitalization, neurosurgical intervention, chronic inflammation, substance abuse all likely contributing to current state. Outpatient follow up with specialists have been interrupted with hospitalizations and inconclusive studies. Will follow up with neurology for evaluation and optimize current therapy.    1/13 Awaiting main campus diversion to be lifted for neurosurgery transfer  1/14:  Awaiting transfer.    Anxiety and depression  Patient has recurrent depression which is moderate and is currently controlled. Will Continue anti-depressant medications. We will consult psychiatry at this time. Patient does not display psychosis at this time. Continue to monitor closely and adjust plan of care as needed.  1/12 bupropion increase to 300 mg daily, gabapentin increase to 400 mg TID            Pulmonary hypertension  Found incidentally on echocardiogram with evidence of elevated right sided pressures and dilated heart chambers on the right side. Unclear the exact etiology or class of Pulm HTN but given her significant substance abuse Class 1 certainly is a possibility.   - Will refer to outpatient pulmonologist  - Will consider a cariology consult while inpatient to see if they think it would be beneficial to have a heart cath done while inpatient      Severe sepsis  This patient does have evidence of infective focus  My overall impression is sepsis. Vital signs were reviewed and noted in progress note.  Antibiotics given-   Antibiotics (From admission, onward)    Start     Stop Route Frequency Ordered    01/14/23 0900  amoxicillin capsule 1,000  mg         -- Oral Every 12 hours 01/13/23 1947    01/02/23 1515  meropenem (MERREM) 1 g in sodium chloride 0.9 % 100 mL IVPB (MB+)        See Hyperspace for full Linked Orders Report.    01/03 0714 IV Every 8 hours (non-standard times) 01/02/23 0923    12/23/22 0533  vancomycin (VANCOCIN) 1,000 mg injection        Note to Pharmacy: Created by cabinet override    12/23 1744   12/23/22 0533    12/23/22 0405  piperacillin-tazobactam (ZOSYN) 4.5 gram injection        Note to Pharmacy: Created by cabinet override    12/23 1614   12/23/22 0405        Cultures were taken-   Microbiology Results (last 7 days)     Procedure Component Value Units Date/Time    Blood culture [[id number]] Collected: 01/10/23 2348    Order Status: Completed Specimen: Blood Updated: 01/14/23 0613     Blood Culture, Routine No Growth to date      No Growth to date      No Growth to date      No Growth to date    Blood culture [[id number]] Collected: 01/10/23 2344    Order Status: Completed Specimen: Blood Updated: 01/14/23 0613     Blood Culture, Routine No Growth to date      No Growth to date      No Growth to date      No Growth to date    Blood culture [[id number]] Collected: 01/10/23 0616    Order Status: Completed Specimen: Blood Updated: 01/13/23 2012     Blood Culture, Routine No growth to date      No Growth to date      No Growth to date      No Growth to date    Blood culture [[id number]] Collected: 01/10/23 0621    Order Status: Completed Specimen: Blood Updated: 01/13/23 2012     Blood Culture, Routine No growth to date      No Growth to date      No Growth to date      No Growth to date    Culture, Respiratory with Gram Stain [[id number]]     Order Status: No result Specimen: Respiratory         Latest lactate reviewed, they are-  No results for input(s): LACTATE in the last 72 hours.    Organ dysfunction indicated by Acute kidney injury, Encephalopathy  and Acute liver injury  Source- ?    Source control Achieved by- see orders  - Merrem  day 7 of 7 (completed on 1/3/23)  - Blood cx x2 (1/2/23) final report no growth    1/7 resolved  1/10 overnight elevated temperature reading, no worsening overall pain. Follow up Blood cultures and imagining.   1/11 second evening with elevated temperature >101F with symptoms, started Merrem  1/13 48 hours of defervescence, NGTD x 3 days on blood cultures (1/10 and 1/11), will discontinue Merrem. Per discussion with Pharm ID, will resume suppressive therapy with amoxicillin 1g BID starting 1/14 1/14:  Blood cultures remain negative. Continue suppressive therapy.      Murmur, cardiac  No evidence of vegetations.     Mild episode of recurrent major depressive disorder  Resume home meds.   - lyrica discontinued  1/12 gabapentin increased to 400 mg TID      Cannabis use disorder, moderate, dependence  As above.      Amphetamine use disorder, severe  Continue to  educate and support.      Spinal stenosis at L4-L5 level  Check xray of back due to pain, increase in scoliosis.   1/10 given proximal muscle weakness with slow improvement with daily PT and OT, will follow up MRI brain and spine  Limitations with facility's imagining capabilities, cannot rule out cord compression over thoracic and lumbar spine.   Main campus neurosurgery has accepted return of patient to their service for further evaluation. Currently on hospital wide diversion.     MRI brain and c spine below.  Cannot r/u cord compression over thoracic and lumbar spine.  Continue with plan for transfer.     MRI BRAIN WITHOUT CONTRAST     CLINICAL HISTORY:  Debility, generalized weakness and pain     COMPARISON:  MRI brain 02/14/2022     TECHNIQUE:  Multiplanar multisequence MR imaging performed of the brain without contrast     FINDINGS:  No detected diffusion signal changes to suggest recent ischemia.  No ventricular or basal cistern effacement.  No midline shift or mass effect.  Major intracranial flow voids appear intact.  Paranasal sinuses and  mastoid air cells are clear.  Calvarial signal is intact.  No signal change to suggest recent or remote hemorrhage.  Prominent gliotic nonspecific white matter signal change in the subcortical and periventricular regions of bilateral cerebral hemispheres unchanged in extent and distribution compared to the prior study.  Corpus callosum, pituitary gland, and remaining midline structures are unremarkable.     Impression:     1. No acute intracranial abnormality identified.  2. Nonspecific gliotic white matter signal change within bilateral cerebral hemispheres similar in extent and distribution greater than expected for patient age.  This may be related to chronic small vessel ischemia.  Numerous chronic systemic illnesses can result in this appearance.  Demyelinating disease is also in the differential.        Electronically signed by: Deion Patel MD  Date:                                            01/10/2023  Time:                                           14:55    Narrative & Impression  EXAMINATION:  MRI CERVICAL SPINE WITHOUT CONTRAST     CLINICAL HISTORY:  Generalized weakness     COMPARISON:  No priors available     TECHNIQUE:  Multiplanar multisequence MR imaging performed of the cervical spine without contrast     FINDINGS:  Imaged portions of the spinal cord demonstrate normal size and signal.  Vertebral bodies are normal in height and alignment.  Artifact present on portions of this exam.  Degenerative related endplate signal changes at C4 through C7.  Marginal osteophytes are present at these levels.     C2-C3: Disc normal in height without herniation.  No spinal canal or foraminal narrowing detected.     C3-C4: Slight loss of disc space height without significant herniation.  No spinal canal or left foraminal narrowing.  Mild right foraminal narrowing related to uncovertebral osteophyte.     C4-C5: Loss of disc space height with broad-based disc osteophyte.  Disc osteophyte approximates the anterior  aspect of the cord right of midline.  Artifact limits evaluation.  The may be trace indentation of the cord.  AP diameter of the thecal sac measures 6 mm in this region.  Neural foramen are not well evaluated secondary to artifact.     C5-C6: Loss of disc space height with broad-based disc osteophyte resulting in mild indentation on the anterior thecal sac approximating the anterior cord without definite cord flattening.  AP diameter of the thecal sac measures just over 8 mm.  Neural foramen are not well evaluated secondary to artifact.  Prominent uncovertebral osteophytes do however appear to be present.     C6-C7: Loss of disc space height with mild broad-based disc osteophyte resulting in mild indentation on the anterior thecal sac, but no cord flattening.  AP diameter of the thecal sac measures 8.5 mm.  Artifact limits evaluation of the neural foramen, but prominent bilateral uncovertebral osteophytes appear to be present.     C7-T1: Maintained disc space height with minimal protrusion at the left lateral recess resulting in no significant spinal canal narrowing.  Limited evaluation of the neural foramen secondary to artifact.     Impression:     Suboptimal study secondary to artifact on portions of this exam.  Multilevel spondylosis is present extending from C3-C7 with individual levels detailed above.        Electronically signed by: Deion Patel MD  Date:                                            01/10/2023  Time:                                           15:01    Substance use disorder  Cessation advised. Follow up consult psychiatry.     Chronic hepatitis C with cirrhosis  Labs noted, will require follow up outpatient with GI to start treatment.       Cirrhosis of liver without ascites  Labs noted, stable.  AST   Date Value Ref Range Status   01/14/2023 58 (H) 10 - 40 U/L Final   01/13/2023 45 (H) 10 - 40 U/L Final   01/12/2023 46 (H) 10 - 40 U/L Final   01/11/2023 45 (H) 10 - 40 U/L Final   01/11/2023 45  (H) 10 - 40 U/L Final     ALT   Date Value Ref Range Status   01/14/2023 28 10 - 44 U/L Final   01/13/2023 23 10 - 44 U/L Final   01/12/2023 23 10 - 44 U/L Final   01/11/2023 20 10 - 44 U/L Final   01/11/2023 20 10 - 44 U/L Final        Alkaline Phosphatase   Date Value Ref Range Status   01/14/2023 137 (H) 55 - 135 U/L Final   01/13/2023 127 55 - 135 U/L Final   01/12/2023 132 55 - 135 U/L Final   01/11/2023 131 55 - 135 U/L Final   01/11/2023 131 55 - 135 U/L Final            Pancytopenia  Counts are stable at this time.  Two days of leukocyte count dropping 2.39 to 1.76 and 1.42.   Overnight febrile event >101F, patient however feels better compared to yesterday evening.   1/11: Morning, vital signs stable, no tachycardia, BP normotensive.   - f/u blood cultures, NGTD x 1 day  - resumed empiric antibiotics, merrem after two days of elevated temperature >101F and downtrending ANC (900/ul)  1/13 drop in ANC to 900/ul, patient without symptoms, vital signs stable    Patient has a leukocytosis.  Last trend as follows-   Lab Results   Component Value Date    WBC 2.54 (L) 01/14/2023    WBC 1.71 (LL) 01/13/2023    WBC 2.28 (L) 01/12/2023     Recent Labs   Lab 01/12/23  0523 01/13/23  0529 01/14/23  0615   Hemoglobin 9.5 L 8.2 L 8.6 L             VTE Risk Mitigation (From admission, onward)         Ordered     IP VTE HIGH RISK PATIENT  Once         12/23/22 0647     Place sequential compression device  Until discontinued         12/23/22 0647                Discharge Planning   SHIRA:      Code Status: Full Code   Is the patient medically ready for discharge?:     Reason for patient still in hospital (select all that apply): Pending disposition  Discharge Plan A: Skilled Nursing Facility   Discharge Delays: None known at this time              Hal Barger Jr, MD  Department of Hospital Medicine   [facility]

## 2023-01-14 NOTE — ASSESSMENT & PLAN NOTE
Counts are stable at this time.  Two days of leukocyte count dropping 2.39 to 1.76 and 1.42.   Overnight febrile event >101F, patient however feels better compared to yesterday evening.   1/11: Morning, vital signs stable, no tachycardia, BP normotensive.   - f/u blood cultures, NGTD x 1 day  - resumed empiric antibiotics, merrem after two days of elevated temperature >101F and downtrending ANC (900/ul)  1/13 drop in ANC to 900/ul, patient without symptoms, vital signs stable

## 2023-01-14 NOTE — ASSESSMENT & PLAN NOTE
This patient does have evidence of infective focus  My overall impression is sepsis. Vital signs were reviewed and noted in progress note.  Antibiotics given-   Antibiotics (From admission, onward)    Start     Stop Route Frequency Ordered    01/14/23 0900  amoxicillin capsule 1,000 mg         -- Oral Every 12 hours 01/13/23 1947 01/02/23 1515  meropenem (MERREM) 1 g in sodium chloride 0.9 % 100 mL IVPB (MB+)        See Hyperspace for full Linked Orders Report.    01/03 0714 IV Every 8 hours (non-standard times) 01/02/23 0923    12/23/22 0533  vancomycin (VANCOCIN) 1,000 mg injection        Note to Pharmacy: Created by cabinet override    12/23 1744   12/23/22 0533    12/23/22 0405  piperacillin-tazobactam (ZOSYN) 4.5 gram injection        Note to Pharmacy: Created by cabinet override    12/23 1614   12/23/22 0405        Cultures were taken-   Microbiology Results (last 7 days)     Procedure Component Value Units Date/Time    Blood culture [[id number]] Collected: 01/10/23 2348    Order Status: Completed Specimen: Blood Updated: 01/13/23 0613     Blood Culture, Routine No Growth to date      No Growth to date      No Growth to date    Blood culture [[id number]] Collected: 01/10/23 2344    Order Status: Completed Specimen: Blood Updated: 01/13/23 0613     Blood Culture, Routine No Growth to date      No Growth to date      No Growth to date    Blood culture [[id number]] Collected: 01/10/23 0616    Order Status: Completed Specimen: Blood Updated: 01/12/23 2012     Blood Culture, Routine No growth to date      No Growth to date      No Growth to date    Blood culture [[id number]] Collected: 01/10/23 0621    Order Status: Completed Specimen: Blood Updated: 01/12/23 2012     Blood Culture, Routine No growth to date      No Growth to date      No Growth to date    Culture, Respiratory with Gram Stain [[id number]]     Order Status: No result Specimen: Respiratory         Latest lactate reviewed, they are-  Recent Labs   Lab  01/10/23  2350   LACTATE 2.0       Organ dysfunction indicated by Acute kidney injury, Encephalopathy  and Acute liver injury  Source- ?    Source control Achieved by- see orders  - Merrem day 7 of 7 (completed on 1/3/23)  - Blood cx x2 (1/2/23) final report no growth    1/7 resolved  1/10 overnight elevated temperature reading, no worsening overall pain. Follow up Blood cultures and imagining.   1/11 second evening with elevated temperature >101F with symptoms, started Merrem  1/13 48 hours of defervescence, NGTD x 3 days on blood cultures (1/10 and 1/11), will discontinue Merrem. Per discussion with Pharm ID, will resume suppressive therapy with amoxicillin 1g BID starting 1/14

## 2023-01-14 NOTE — ASSESSMENT & PLAN NOTE
Lower extremity weakness bilaterally now requiring aggressive OT and PT efforts to prevent further weakness and also regain strength necessary to carry out ADLs.   - From early bed bound state, patient has required multiple nursing staff in safely getting in and out of bed   - Original discharge plans with outpatient rehab will cause more harm to patient at this time and case management working to find a placement at SNF with PT and OT efforts  1/9 continue efforts with PT and OT, awaiting placement  1/10 slow progress with PT and OT, will obtain MRI brain and spine to identify any concerning changes  1/11 MRI limitations to TL spine study secondary to hardware, brain and cervical scan noting no acute findings;   1. No acute intracranial abnormality identified.  2. Nonspecific gliotic white matter signal change within bilateral cerebral hemispheres similar in extent and distribution greater than expected for patient age. This may be related to chronic small vessel ischemia.  Numerous chronic systemic illnesses can result in this appearance. Demyelinating disease is also in the differential.  Multiple prolonged hospitalization, neurosurgical intervention, chronic inflammation, substance abuse all likely contributing to current state. Outpatient follow up with specialists have been interrupted with hospitalizations and inconclusive studies. Will follow up with neurology for evaluation and optimize current therapy.    1/13 Awaiting main campus diversion to be lifted for neurosurgery transfer

## 2023-01-14 NOTE — ASSESSMENT & PLAN NOTE
Lower extremity weakness bilaterally now requiring aggressive OT and PT efforts to prevent further weakness and also regain strength necessary to carry out ADLs.   - From early bed bound state, patient has required multiple nursing staff in safely getting in and out of bed   - Original discharge plans with outpatient rehab will cause more harm to patient at this time and case management working to find a placement at SNF with PT and OT efforts  1/9 continue efforts with PT and OT, awaiting placement  1/10 slow progress with PT and OT, will obtain MRI brain and spine to identify any concerning changes  1/11 MRI limitations to TL spine study secondary to hardware, brain and cervical scan noting no acute findings;   1. No acute intracranial abnormality identified.  2. Nonspecific gliotic white matter signal change within bilateral cerebral hemispheres similar in extent and distribution greater than expected for patient age. This may be related to chronic small vessel ischemia.  Numerous chronic systemic illnesses can result in this appearance. Demyelinating disease is also in the differential.  Multiple prolonged hospitalization, neurosurgical intervention, chronic inflammation, substance abuse all likely contributing to current state. Outpatient follow up with specialists have been interrupted with hospitalizations and inconclusive studies. Will follow up with neurology for evaluation and optimize current therapy.    1/13 Awaiting main campus diversion to be lifted for neurosurgery transfer  1/14:  Awaiting transfer.

## 2023-01-14 NOTE — ASSESSMENT & PLAN NOTE
Labs noted, stable.  AST   Date Value Ref Range Status   01/13/2023 45 (H) 10 - 40 U/L Final   01/12/2023 46 (H) 10 - 40 U/L Final   01/11/2023 45 (H) 10 - 40 U/L Final   01/11/2023 45 (H) 10 - 40 U/L Final   01/10/2023 47 (H) 10 - 40 U/L Final     ALT   Date Value Ref Range Status   01/13/2023 23 10 - 44 U/L Final   01/12/2023 23 10 - 44 U/L Final   01/11/2023 20 10 - 44 U/L Final   01/11/2023 20 10 - 44 U/L Final   01/10/2023 23 10 - 44 U/L Final        Alkaline Phosphatase   Date Value Ref Range Status   01/13/2023 127 55 - 135 U/L Final   01/12/2023 132 55 - 135 U/L Final   01/11/2023 131 55 - 135 U/L Final   01/11/2023 131 55 - 135 U/L Final   01/10/2023 123 55 - 135 U/L Final

## 2023-01-15 LAB
ALBUMIN SERPL BCP-MCNC: 2.1 G/DL (ref 3.5–5.2)
ALP SERPL-CCNC: 149 U/L (ref 55–135)
ALT SERPL W/O P-5'-P-CCNC: 34 U/L (ref 10–44)
ANION GAP SERPL CALC-SCNC: 4 MMOL/L (ref 8–16)
AST SERPL-CCNC: 66 U/L (ref 10–40)
BACTERIA BLD CULT: NORMAL
BACTERIA BLD CULT: NORMAL
BASOPHILS # BLD AUTO: 0.03 K/UL (ref 0–0.2)
BASOPHILS NFR BLD: 1.1 % (ref 0–1.9)
BILIRUB SERPL-MCNC: 1.3 MG/DL (ref 0.1–1)
BUN SERPL-MCNC: 15 MG/DL (ref 6–20)
CALCIUM SERPL-MCNC: 8.4 MG/DL (ref 8.7–10.5)
CHLORIDE SERPL-SCNC: 106 MMOL/L (ref 95–110)
CO2 SERPL-SCNC: 27 MMOL/L (ref 23–29)
CREAT SERPL-MCNC: 0.4 MG/DL (ref 0.5–1.4)
DIFFERENTIAL METHOD: ABNORMAL
EOSINOPHIL # BLD AUTO: 0.2 K/UL (ref 0–0.5)
EOSINOPHIL NFR BLD: 6.5 % (ref 0–8)
ERYTHROCYTE [DISTWIDTH] IN BLOOD BY AUTOMATED COUNT: 16.2 % (ref 11.5–14.5)
EST. GFR  (NO RACE VARIABLE): >60 ML/MIN/1.73 M^2
GLUCOSE SERPL-MCNC: 88 MG/DL (ref 70–110)
HCT VFR BLD AUTO: 30.5 % (ref 37–48.5)
HGB BLD-MCNC: 9.9 G/DL (ref 12–16)
IMM GRANULOCYTES # BLD AUTO: 0.01 K/UL (ref 0–0.04)
IMM GRANULOCYTES NFR BLD AUTO: 0.4 % (ref 0–0.5)
LYMPHOCYTES # BLD AUTO: 0.5 K/UL (ref 1–4.8)
LYMPHOCYTES NFR BLD: 19.2 % (ref 18–48)
MAGNESIUM SERPL-MCNC: 1.8 MG/DL (ref 1.6–2.6)
MCH RBC QN AUTO: 27.7 PG (ref 27–31)
MCHC RBC AUTO-ENTMCNC: 32.5 G/DL (ref 32–36)
MCV RBC AUTO: 85 FL (ref 82–98)
MONOCYTES # BLD AUTO: 0.2 K/UL (ref 0.3–1)
MONOCYTES NFR BLD: 9.2 % (ref 4–15)
NEUTROPHILS # BLD AUTO: 1.7 K/UL (ref 1.8–7.7)
NEUTROPHILS NFR BLD: 63.6 % (ref 38–73)
NRBC BLD-RTO: 0 /100 WBC
PLATELET # BLD AUTO: 133 K/UL (ref 150–450)
PMV BLD AUTO: 10.7 FL (ref 9.2–12.9)
POTASSIUM SERPL-SCNC: 4.1 MMOL/L (ref 3.5–5.1)
PROT SERPL-MCNC: 7.9 G/DL (ref 6–8.4)
RBC # BLD AUTO: 3.58 M/UL (ref 4–5.4)
SODIUM SERPL-SCNC: 137 MMOL/L (ref 136–145)
WBC # BLD AUTO: 2.61 K/UL (ref 3.9–12.7)

## 2023-01-15 PROCEDURE — 94761 N-INVAS EAR/PLS OXIMETRY MLT: CPT

## 2023-01-15 PROCEDURE — 25000003 PHARM REV CODE 250: Performed by: INTERNAL MEDICINE

## 2023-01-15 PROCEDURE — 27000207 HC ISOLATION

## 2023-01-15 PROCEDURE — 25000003 PHARM REV CODE 250: Performed by: STUDENT IN AN ORGANIZED HEALTH CARE EDUCATION/TRAINING PROGRAM

## 2023-01-15 PROCEDURE — 99900035 HC TECH TIME PER 15 MIN (STAT)

## 2023-01-15 PROCEDURE — 83735 ASSAY OF MAGNESIUM: CPT | Performed by: INTERNAL MEDICINE

## 2023-01-15 PROCEDURE — 36415 COLL VENOUS BLD VENIPUNCTURE: CPT | Performed by: INTERNAL MEDICINE

## 2023-01-15 PROCEDURE — 85025 COMPLETE CBC W/AUTO DIFF WBC: CPT | Performed by: INTERNAL MEDICINE

## 2023-01-15 PROCEDURE — 80053 COMPREHEN METABOLIC PANEL: CPT | Performed by: INTERNAL MEDICINE

## 2023-01-15 PROCEDURE — 25000003 PHARM REV CODE 250: Performed by: PSYCHIATRY & NEUROLOGY

## 2023-01-15 PROCEDURE — 99900031 HC PATIENT EDUCATION (STAT)

## 2023-01-15 PROCEDURE — 11000001 HC ACUTE MED/SURG PRIVATE ROOM

## 2023-01-15 RX ORDER — PROMETHAZINE HYDROCHLORIDE 25 MG/1
25 SUPPOSITORY RECTAL EVERY 6 HOURS PRN
Status: CANCELLED | OUTPATIENT
Start: 2023-01-15

## 2023-01-15 RX ORDER — GABAPENTIN 400 MG/1
400 CAPSULE ORAL 3 TIMES DAILY
Status: CANCELLED | OUTPATIENT
Start: 2023-01-15

## 2023-01-15 RX ORDER — POLYETHYLENE GLYCOL 3350 17 G/17G
17 POWDER, FOR SOLUTION ORAL DAILY
Status: CANCELLED | OUTPATIENT
Start: 2023-01-16

## 2023-01-15 RX ORDER — ADHESIVE BANDAGE
30 BANDAGE TOPICAL DAILY PRN
Status: CANCELLED | OUTPATIENT
Start: 2023-01-15

## 2023-01-15 RX ORDER — POTASSIUM CHLORIDE 20 MEQ/1
20 TABLET, EXTENDED RELEASE ORAL DAILY
Status: CANCELLED | OUTPATIENT
Start: 2023-01-16

## 2023-01-15 RX ORDER — LANOLIN ALCOHOL/MO/W.PET/CERES
400 CREAM (GRAM) TOPICAL DAILY
Status: CANCELLED | OUTPATIENT
Start: 2023-01-16

## 2023-01-15 RX ORDER — ALPRAZOLAM 0.25 MG/1
0.25 TABLET ORAL 3 TIMES DAILY PRN
Status: CANCELLED | OUTPATIENT
Start: 2023-01-15

## 2023-01-15 RX ORDER — PANTOPRAZOLE SODIUM 40 MG/1
40 TABLET, DELAYED RELEASE ORAL DAILY
Status: CANCELLED | OUTPATIENT
Start: 2023-01-16

## 2023-01-15 RX ORDER — HYDROCODONE BITARTRATE AND ACETAMINOPHEN 5; 325 MG/1; MG/1
1 TABLET ORAL EVERY 6 HOURS PRN
Status: CANCELLED | OUTPATIENT
Start: 2023-01-15

## 2023-01-15 RX ORDER — AMOXICILLIN 250 MG/1
1000 CAPSULE ORAL EVERY 12 HOURS
Status: CANCELLED | OUTPATIENT
Start: 2023-01-15

## 2023-01-15 RX ORDER — LACTULOSE 10 G/15ML
20 SOLUTION ORAL 3 TIMES DAILY
Status: CANCELLED | OUTPATIENT
Start: 2023-01-15

## 2023-01-15 RX ORDER — TALC
6 POWDER (GRAM) TOPICAL NIGHTLY PRN
Status: CANCELLED | OUTPATIENT
Start: 2023-01-15

## 2023-01-15 RX ORDER — BUPROPION HYDROCHLORIDE 150 MG/1
300 TABLET ORAL DAILY
Status: CANCELLED | OUTPATIENT
Start: 2023-01-16

## 2023-01-15 RX ADMIN — AMOXICILLIN 1000 MG: 250 CAPSULE ORAL at 09:01

## 2023-01-15 RX ADMIN — ALPRAZOLAM 0.25 MG: 0.25 TABLET ORAL at 05:01

## 2023-01-15 RX ADMIN — AMOXICILLIN 1000 MG: 250 CAPSULE ORAL at 08:01

## 2023-01-15 RX ADMIN — ALPRAZOLAM 0.25 MG: 0.25 TABLET ORAL at 09:01

## 2023-01-15 RX ADMIN — HYDROCODONE BITARTRATE AND ACETAMINOPHEN 1 TABLET: 5; 325 TABLET ORAL at 05:01

## 2023-01-15 RX ADMIN — POTASSIUM CHLORIDE 20 MEQ: 1500 TABLET, EXTENDED RELEASE ORAL at 08:01

## 2023-01-15 RX ADMIN — BUPROPION HYDROCHLORIDE 300 MG: 150 TABLET, EXTENDED RELEASE ORAL at 08:01

## 2023-01-15 RX ADMIN — GABAPENTIN 400 MG: 400 CAPSULE ORAL at 03:01

## 2023-01-15 RX ADMIN — HYDROCODONE BITARTRATE AND ACETAMINOPHEN 1 TABLET: 5; 325 TABLET ORAL at 08:01

## 2023-01-15 RX ADMIN — GABAPENTIN 400 MG: 400 CAPSULE ORAL at 09:01

## 2023-01-15 RX ADMIN — GABAPENTIN 400 MG: 400 CAPSULE ORAL at 08:01

## 2023-01-15 RX ADMIN — HYDROCODONE BITARTRATE AND ACETAMINOPHEN 1 TABLET: 5; 325 TABLET ORAL at 02:01

## 2023-01-15 RX ADMIN — ALPRAZOLAM 0.25 MG: 0.25 TABLET ORAL at 02:01

## 2023-01-15 RX ADMIN — ALPRAZOLAM 0.25 MG: 0.25 TABLET ORAL at 08:01

## 2023-01-15 RX ADMIN — PANTOPRAZOLE SODIUM 40 MG: 40 TABLET, DELAYED RELEASE ORAL at 08:01

## 2023-01-15 RX ADMIN — MAGNESIUM OXIDE TAB 400 MG (241.3 MG ELEMENTAL MG) 400 MG: 400 (241.3 MG) TAB at 08:01

## 2023-01-15 NOTE — PLAN OF CARE
Problem: Adult Inpatient Plan of Care  Goal: Optimal Comfort and Wellbeing  Outcome: Ongoing, Not Progressing  Goal: Readiness for Transition of Care  Outcome: Ongoing, Not Progressing     Problem: Adjustment to Illness (Sepsis/Septic Shock)  Goal: Optimal Coping  Outcome: Ongoing, Not Progressing        Problem: Infection  Goal: Absence of Infection Signs and Symptoms  Outcome: Ongoing, Progressing     Problem: Adult Inpatient Plan of Care  Goal: Plan of Care Review  Outcome: Ongoing, Progressing  Goal: Patient-Specific Goal (Individualized)  Outcome: Ongoing, Progressing  Goal: Absence of Hospital-Acquired Illness or Injury  Outcome: Ongoing, Progressing     Problem: Bleeding (Sepsis/Septic Shock)  Goal: Absence of Bleeding  Outcome: Ongoing, Progressing     Problem: Glycemic Control Impaired (Sepsis/Septic Shock)  Goal: Blood Glucose Level Within Desired Range  Outcome: Ongoing, Progressing     Problem: Infection Progression (Sepsis/Septic Shock)  Goal: Absence of Infection Signs and Symptoms  Outcome: Ongoing, Progressing     Problem: Nutrition Impaired (Sepsis/Septic Shock)  Goal: Optimal Nutrition Intake  Outcome: Ongoing, Progressing     Problem: Skin Injury Risk Increased  Goal: Skin Health and Integrity  Outcome: Ongoing, Progressing     Problem: Fall Injury Risk  Goal: Absence of Fall and Fall-Related Injury  Outcome: Ongoing, Progressing     Problem: Balance Impairment (Functional Deficit)  Goal: Improved Balance and Postural Control  Outcome: Ongoing, Progressing     Problem: Coordination Impairment (Functional Deficit)  Goal: Optimal Coordination  Outcome: Ongoing, Progressing     Problem: Muscle Strength Impairment (Functional Deficit)  Goal: Improved Muscle Strength  Outcome: Ongoing, Progressing     Problem: Range of Motion Impairment (Functional Deficit)  Goal: Optimal Range of Motion  Outcome: Ongoing, Progressing     Problem: Sensory Impairment (Functional Deficit)  Goal: Compensation for  Sensory Deficit  Outcome: Ongoing, Progressing

## 2023-01-15 NOTE — ASSESSMENT & PLAN NOTE
Counts are stable at this time.  Two days of leukocyte count dropping 2.39 to 1.76 and 1.42.   Overnight febrile event >101F, patient however feels better compared to yesterday evening.   1/11: Morning, vital signs stable, no tachycardia, BP normotensive.   - f/u blood cultures, NGTD x 1 day  - resumed empiric antibiotics, merrem after two days of elevated temperature >101F and downtrending ANC (900/ul)  1/13 drop in ANC to 900/ul, patient without symptoms, vital signs stable    Patient has a leukocytosis.  Last trend as follows-   Lab Results   Component Value Date    WBC 2.61 (L) 01/15/2023    WBC 2.54 (L) 01/14/2023    WBC 1.71 (LL) 01/13/2023     Recent Labs   Lab 01/13/23  0529 01/14/23  0615 01/15/23  0424   Hemoglobin 8.2 L 8.6 L 9.9 L

## 2023-01-15 NOTE — ASSESSMENT & PLAN NOTE
This patient does have evidence of infective focus  My overall impression is sepsis. Vital signs were reviewed and noted in progress note.  Antibiotics given-   Antibiotics (From admission, onward)    Start     Stop Route Frequency Ordered    01/14/23 0900  amoxicillin capsule 1,000 mg         -- Oral Every 12 hours 01/13/23 1947 01/02/23 1515  meropenem (MERREM) 1 g in sodium chloride 0.9 % 100 mL IVPB (MB+)        See Hyperspace for full Linked Orders Report.    01/03 0714 IV Every 8 hours (non-standard times) 01/02/23 0923    12/23/22 0533  vancomycin (VANCOCIN) 1,000 mg injection        Note to Pharmacy: Created by cabinet override    12/23 1744   12/23/22 0533    12/23/22 0405  piperacillin-tazobactam (ZOSYN) 4.5 gram injection        Note to Pharmacy: Created by cabinet override    12/23 1614   12/23/22 0405        Cultures were taken-   Microbiology Results (last 7 days)     Procedure Component Value Units Date/Time    Blood culture [[id number]] Collected: 01/10/23 2348    Order Status: Completed Specimen: Blood Updated: 01/15/23 0613     Blood Culture, Routine No Growth to date      No Growth to date      No Growth to date      No Growth to date      No Growth to date    Blood culture [[id number]] Collected: 01/10/23 2344    Order Status: Completed Specimen: Blood Updated: 01/15/23 0613     Blood Culture, Routine No Growth to date      No Growth to date      No Growth to date      No Growth to date      No Growth to date    Blood culture [[id number]] Collected: 01/10/23 0616    Order Status: Completed Specimen: Blood Updated: 01/14/23 2012     Blood Culture, Routine No growth to date      No Growth to date      No Growth to date      No Growth to date      No Growth to date    Blood culture [[id number]] Collected: 01/10/23 0621    Order Status: Completed Specimen: Blood Updated: 01/14/23 2012     Blood Culture, Routine No growth to date      No Growth to date      No Growth to date      No Growth to date       No Growth to date    Culture, Respiratory with Gram Stain [[id number]]     Order Status: No result Specimen: Respiratory         Latest lactate reviewed, they are-  No results for input(s): LACTATE in the last 72 hours.    Organ dysfunction indicated by Acute kidney injury, Encephalopathy  and Acute liver injury  Source- ?    Source control Achieved by- see orders  - Merrem day 7 of 7 (completed on 1/3/23)  - Blood cx x2 (1/2/23) final report no growth    1/7 resolved  1/10 overnight elevated temperature reading, no worsening overall pain. Follow up Blood cultures and imagining.   1/11 second evening with elevated temperature >101F with symptoms, started Merrem  1/13 48 hours of defervescence, NGTD x 3 days on blood cultures (1/10 and 1/11), will discontinue Merrem. Per discussion with Pharm ID, will resume suppressive therapy with amoxicillin 1g BID starting 1/14        Blood cultures remain negative. Continue suppressive therapy.

## 2023-01-15 NOTE — DISCHARGE SUMMARY
[facility] Medicine  Discharge Summary      Patient Name: Rachel Zazueta  MRN: [id number]  KAY: [id number]  Patient Class: IP- Inpatient  Admission Date: 12/23/2022  Hospital Length of Stay: 23 days  Discharge Date and Time:  01/15/2023 1:54 PM  Attending Physician: Dannielle Merino DO   Discharging Provider: Hal Barger Jr, MD  Primary Care Provider: Dannielle Merino DO    Primary Care Team: Networked reference to record PCT     HPI:   ER HPI:  42-year-old female with a history of anemia, anxiety, recurrent cellulitis, hepatitis C, IVDU, liver cirrhosis, cholecystectomy, kidney stones, lumbar fusion, shoulder surgery, chronic back pain comes in c/o generalized weakness, fatigue, and bilateral flank pain.  She reports that this has been going on intermittently for several days and was diagnosed with a UTI recently.  No fever.  No abdominal pain or vomiting or diarrhea.     IM HPI:  Patient is well known to our service.  Patient has a history of IV drug abuse and unfortunately has relapsed.  Patient has a history of a epidural abscess requiring a large lumbar surgery with multiple rehabilitative stays and therapy.  The patient was progressing to ambulation and recently has declined.  Patient admits to starting drugs including IV drug use again.  Patient presented to the ED with the above symptoms and she has a noticeable cardiac murmur today that we are getting a stat echocardiogram.  Patient's been started on antibiotics fluid resuscitated and seems to have sepsis.  Patient's urinary studies were abnormal but not overwhelming.      * No surgery found *      Hospital Course:   12/24/22 CG: Patient remains in the ICU today. Has a lot of muscle spasms and low back pain. Echocardiogram ordered yesterday and completed; significant for pulm HTN but without vegetations.   12/25/22 CG:  Overnight she was having a lot of significant discomfort and pain.  We placed her on Precedex and this has  "helped significantly with her body aches, her irritability and muscle spasms."  12/26/22 FM:  Patient required Precedex for agitation and mood changes.  Patient is calm and cooperative this morning.  Patient appears to have E coli sepsis so will taper antibiotics.  Patient's echocardiogram showed no vegetation and E coli would be low risk for metastatic infection.  Will transfer the patient to the floor once her Precedex discontinue we counseled her again today on the importance of avoiding substances and illicit drugs.  This continues to be setback for her.  12/27/22 WC:  Patient overall remains stable and is slowly improving.  Urine culture has revealed Candida albicans in addition to blood culture revealing E coli.  12/28/22 WC:  Patient resting comfortably this morning.  Pharm ID on case for treatment duration recs.    12/29/22 WC:  no acute events overnight.  Back pain at this time.   12/20/22:  no acute events continue IV ABX for esbl  12/31/22 CG: stable, continuing antibiotics.   1/1/2 CG: stable, continuing antibiotics. Has not had a bowel movement in two weeks. Will give lactulose.    01/02/2023: Patient did have a bowel movement yesterday.  Completing last day of antimicrobial therapy anticipated discharge tomorrow.  1/3/2023: Improving BM with lactulose. Lower extremity weakness requiring aggressive PT and OT efforts. Patient understands she needs to be an active participant. Encouraged inbed physical exercises without assistance with therapists. Stable Hg/HCT.   1/4/2023: Lactulose every other day for regular bowel movement. PT and OT recommendations with continue efforts to help with lower extremity weakness which likely precipitated from minimal participation from patient over the past week to remain physically active. Hg/HCT remains stable. Last blood Cx x2 (12/28) NGTD x5 days. Patient agrees moving forward lower extremity strengthening will have to take place with her participation and motivation. " Will discharge with continue PT and OT efforts outpatient. She acknowledges outpatient follow up with GI/hepatologist, hematologist and neurologist required to address all other chronic medication conditions.   1/5/2023: Overnight report from nursing that patient exhibited weakness in lower extremities and in upper body strength to support self to safely transfer from bed to commode or even bed to wheelchair. CM involved in placement to SNF. Continue with PT and OT. All other medical condition addressed during hospitalization remains stable.  1/6/2023: CM working on SNF placement. Patient expresses continue effort on her part to regain her strength, reassured her that she has been able to participate following the leads of PT and OT and so a new injury is less likely at this time. While small, she endorsed sitting up at bedside with legs dangle.    1/7/23 [place] patient feeling well this morning no complaints will continue therapy services and rehab placement  1/8/23 ND pt reports some back pain will repeat x-rays to make sure everything looks okay from previous surgery.  Otherwise she is doing well  1/9/23 Repeat xray showing increased kyphosis over TL junction. Today, pain associated with massaging feet bilaterally with no worsening of numbness and tingling. No reported concerns with PT and OT efforts. Awaiting placement.  1/10/23 Overnight elevated temperature >101F, no further discomfort at this time and on morning exam denies further symptoms and worsening pain over joints and spine. PT and OT efforts moving forward, but still has significant muscle weakness proximally and over her core. White cells reduced, CRP elevated. Will obtain MRI scan brain and spine to follow up with worsening scoliosis, identify occult findings.    1/11/23 Second evening with elevated temp >101F with chills and tachycardia. Sepsis precautions taken with labs and cultures. No obvious infectious source, but empirically started merrem for  neutropenic fever precautions. Patient this morning endorses good appetite and hopeful in being able to sit up longer today. Yesterday, she was able to stay seated 10 minutes from 3 minutes, the day prior. Pancytopenia profile back to patient's baseline. Overall pain stable and no reported worsening. Lower extremity weakness with loss of flexion at knees and hips against gravity still prominent. Will appreciate neurology consult. Continue PT and OT.   1/12/23 Neurology consult for paraparesis, differential diagnoses include demyelinating disease, ischemic changes (MRI white matter changes), spinal cord lesions (pt with history of past epidural abscesses), compressive myelopathy.   - MRI brain with white matter changes that are concerning for demyelinating disease vs ischemic change  -Thoracic spine radiographs, three views, demonstrate multilevel thoracolumbar fusion with local kyphosis at T9-10, increased when compared to scoliosis series of 04/14/2022, measuring approximately 40 degree on today's exam and 30° on prior exam  Current facility with CT imagining limitations to further determine nature of spinal compression likely affecting bilateral lower extremity, hyperreflexia and reported altered sensation to feet. Pulses intact bilaterally.   Currently awaiting diversion at [facility] to be lifted for transfer to neurosurgery service with further neurology work up.   No fever overnight. Blood cultures NGTD x 2 days. Will continue merrem for another 24 hours, then discontinue if no elevated temperature reading.   1/13/23 No fever overnight, WBC dropped again. NGTD x3 days on all blood cultures collected after febrile episodes x2, will discontinue merrem and resume suppressive amoxicillin Q12H secondary to partial hardware retention and hx of GBS. Early PM started to complain of  muscle spasms over her entire back that would release with breathing. Tizanidine started and pain control improved. Awaiting transfer to  neurosurgery service for advanced imagining and neurology consult.   1/14/23 WC: patient afebrile overnight.  Blood cultures remain negative.  Tolerating transition to suppressive abx therapy with amoxicillin secondary to partial hardware retention.  Endorses ongoing weakness to bilateral lower ext.  Positive babinski bilaterally.  Transfer for advanced imaging and neuro consult.   01/15/2023:  A bed has become available at [facility] main campus.  Patient will be transferred for high-level care to include Hospital Medicine, Neurosurgery and any other potential sources she may need.  We appreciate their assistance with transfer.       Goals of Care Treatment Preferences:  Code Status: Full Code      Consults:   Consults (From admission, onward)        Status Ordering Provider     Inpatient consult to Telemedicine-General Neurology  Once        Provider:  Jorje Guerrero MD    Completed CAMILA LOVE     Inpatient consult to Social Work/Case Management  Once        Provider:  (Not yet assigned)    Completed CAMILA LOVE     Inpatient consult to Psychiatry  Once        Provider:  Micky Perkins NP    Completed CAMILA LOVE          * Debility  Lower extremity weakness bilaterally now requiring aggressive OT and PT efforts to prevent further weakness and also regain strength necessary to carry out ADLs.   - From early bed bound state, patient has required multiple nursing staff in safely getting in and out of bed   - Original discharge plans with outpatient rehab will cause more harm to patient at this time and case management working to find a placement at SNF with PT and OT efforts  1/9 continue efforts with PT and OT, awaiting placement  1/10 slow progress with PT and OT, will obtain MRI brain and spine to identify any concerning changes  1/11 MRI limitations to TL spine study secondary to hardware, brain and cervical scan noting no acute findings;   1. No acute intracranial abnormality  identified.  2. Nonspecific gliotic white matter signal change within bilateral cerebral hemispheres similar in extent and distribution greater than expected for patient age. This may be related to chronic small vessel ischemia.  Numerous chronic systemic illnesses can result in this appearance. Demyelinating disease is also in the differential.  Multiple prolonged hospitalization, neurosurgical intervention, chronic inflammation, substance abuse all likely contributing to current state. Outpatient follow up with specialists have been interrupted with hospitalizations and inconclusive studies. Will follow up with neurology for evaluation and optimize current therapy.    1/13 Awaiting main [facility] diversion to be lifted for neurosurgery transfer  1/14:  Awaiting transfer.  1/15:  Patient has been accepted to [facility] will transfer for neurosurgery evaluation and imaging.    Anxiety and depression  Patient has recurrent depression which is moderate and is currently controlled. Will Continue anti-depressant medications. We will consult psychiatry at this time. Patient does not display psychosis at this time. Continue to monitor closely and adjust plan of care as needed.  1/12 bupropion increase to 300 mg daily, gabapentin increase to 400 mg TID            Pulmonary hypertension  Found incidentally on echocardiogram with evidence of elevated right sided pressures and dilated heart chambers on the right side. Unclear the exact etiology or class of Pulm HTN but given her significant substance abuse Class 1 certainly is a possibility.   - Will refer to outpatient pulmonologist  - Will consider a cariology consult while inpatient to see if they think it would be beneficial to have a heart cath done while inpatient      Severe sepsis  This patient does have evidence of infective focus  My overall impression is sepsis. Vital signs were reviewed and noted in progress note.  Antibiotics given-   Antibiotics (From admission,  onward)    Start     Stop Route Frequency Ordered    01/14/23 0900  amoxicillin capsule 1,000 mg         -- Oral Every 12 hours 01/13/23 1947    01/02/23 1515  meropenem (MERREM) 1 g in sodium chloride 0.9 % 100 mL IVPB (MB+)        [facility].    01/03 0714 IV Every 8 hours (non-standard times) 01/02/23 0923    12/23/22 0533  vancomycin (VANCOCIN) 1,000 mg injection        Note to Pharmacy: Created by cabinet override    12/23 1744   12/23/22 0533    12/23/22 0405  piperacillin-tazobactam (ZOSYN) 4.5 gram injection        Note to Pharmacy: Created by cabinet override    12/23 1614   12/23/22 0405        Cultures were taken-   Microbiology Results (last 7 days)     Procedure Component Value Units Date/Time    Blood culture [[id number]] Collected: 01/10/23 2348    Order Status: Completed Specimen: Blood Updated: 01/15/23 0613     Blood Culture, Routine No Growth to date      No Growth to date      No Growth to date      No Growth to date      No Growth to date    Blood culture [[id number]] Collected: 01/10/23 2344    Order Status: Completed Specimen: Blood Updated: 01/15/23 0613     Blood Culture, Routine No Growth to date      No Growth to date      No Growth to date      No Growth to date      No Growth to date    Blood culture [[id number]] Collected: 01/10/23 0616    Order Status: Completed Specimen: Blood Updated: 01/14/23 2012     Blood Culture, Routine No growth to date      No Growth to date      No Growth to date      No Growth to date      No Growth to date    Blood culture [[id number]] Collected: 01/10/23 0621    Order Status: Completed Specimen: Blood Updated: 01/14/23 2012     Blood Culture, Routine No growth to date      No Growth to date      No Growth to date      No Growth to date      No Growth to date    Culture, Respiratory with Gram Stain [[id number]]     Order Status: No result Specimen: Respiratory         Latest lactate reviewed, they are-  No results for input(s):  LACTATE in the last 72 hours.    Organ dysfunction indicated by Acute kidney injury, Encephalopathy  and Acute liver injury  Source- ?    Source control Achieved by- see orders  - Merrem day 7 of 7 (completed on 1/3/23)  - Blood cx x2 (1/2/23) final report no growth    1/7 resolved  1/10 overnight elevated temperature reading, no worsening overall pain. Follow up Blood cultures and imagining.   1/11 second evening with elevated temperature >101F with symptoms, started Merrem  1/13 48 hours of defervescence, NGTD x 3 days on blood cultures (1/10 and 1/11), will discontinue Merrem. Per discussion with Pharm ID, will resume suppressive therapy with amoxicillin 1g BID starting 1/14        Blood cultures remain negative. Continue suppressive therapy.      Murmur, cardiac  No evidence of vegetations.     Mild episode of recurrent major depressive disorder  Resume home meds.   - lyrica discontinued  1/12 gabapentin increased to 400 mg TID      Cannabis use disorder, moderate, dependence  As above.      Amphetamine use disorder, severe  Continue to  educate and support.      Spinal stenosis at L4-L5 level  Check xray of back due to pain, increase in scoliosis.   1/10 given proximal muscle weakness with slow improvement with daily PT and OT, will follow up MRI brain and spine  Limitations with facility's imagining capabilities, cannot rule out cord compression over thoracic and lumbar spine.   [facility] neurosurgery has accepted return of patient to their service for further evaluation. Currently on hospital wide diversion.     1/15: [facility] does have a bed available will transfer for higher level of care at this time.    MRI brain and c spine below.  Cannot r/u cord compression over thoracic and lumbar spine.  Continue with plan for transfer.     MRI BRAIN WITHOUT CONTRAST     CLINICAL HISTORY:  Debility, generalized weakness and pain     COMPARISON:  MRI brain 02/14/2022     TECHNIQUE:  Multiplanar  multisequence MR imaging performed of the brain without contrast     FINDINGS:  No detected diffusion signal changes to suggest recent ischemia.  No ventricular or basal cistern effacement.  No midline shift or mass effect.  Major intracranial flow voids appear intact.  Paranasal sinuses and mastoid air cells are clear.  Calvarial signal is intact.  No signal change to suggest recent or remote hemorrhage.  Prominent gliotic nonspecific white matter signal change in the subcortical and periventricular regions of bilateral cerebral hemispheres unchanged in extent and distribution compared to the prior study.  Corpus callosum, pituitary gland, and remaining midline structures are unremarkable.     Impression:     1. No acute intracranial abnormality identified.  2. Nonspecific gliotic white matter signal change within bilateral cerebral hemispheres similar in extent and distribution greater than expected for patient age.  This may be related to chronic small vessel ischemia.  Numerous chronic systemic illnesses can result in this appearance.  Demyelinating disease is also in the differential.        Electronically signed by: Deion Patel MD  Date:                                            01/10/2023  Time:                                           14:55    Narrative & Impression  EXAMINATION:  MRI CERVICAL SPINE WITHOUT CONTRAST     CLINICAL HISTORY:  Generalized weakness     COMPARISON:  No priors available     TECHNIQUE:  Multiplanar multisequence MR imaging performed of the cervical spine without contrast     FINDINGS:  Imaged portions of the spinal cord demonstrate normal size and signal.  Vertebral bodies are normal in height and alignment.  Artifact present on portions of this exam.  Degenerative related endplate signal changes at C4 through C7.  Marginal osteophytes are present at these levels.     C2-C3: Disc normal in height without herniation.  No spinal canal or foraminal narrowing detected.     C3-C4:  Slight loss of disc space height without significant herniation.  No spinal canal or left foraminal narrowing.  Mild right foraminal narrowing related to uncovertebral osteophyte.     C4-C5: Loss of disc space height with broad-based disc osteophyte.  Disc osteophyte approximates the anterior aspect of the cord right of midline.  Artifact limits evaluation.  The may be trace indentation of the cord.  AP diameter of the thecal sac measures 6 mm in this region.  Neural foramen are not well evaluated secondary to artifact.     C5-C6: Loss of disc space height with broad-based disc osteophyte resulting in mild indentation on the anterior thecal sac approximating the anterior cord without definite cord flattening.  AP diameter of the thecal sac measures just over 8 mm.  Neural foramen are not well evaluated secondary to artifact.  Prominent uncovertebral osteophytes do however appear to be present.     C6-C7: Loss of disc space height with mild broad-based disc osteophyte resulting in mild indentation on the anterior thecal sac, but no cord flattening.  AP diameter of the thecal sac measures 8.5 mm.  Artifact limits evaluation of the neural foramen, but prominent bilateral uncovertebral osteophytes appear to be present.     C7-T1: Maintained disc space height with minimal protrusion at the left lateral recess resulting in no significant spinal canal narrowing.  Limited evaluation of the neural foramen secondary to artifact.     Impression:     Suboptimal study secondary to artifact on portions of this exam.  Multilevel spondylosis is present extending from C3-C7 with individual levels detailed above.        Electronically signed by: Deion Patel MD  Date:                                            01/10/2023  Time:                                           15:01    Substance use disorder  Cessation advised. Follow up consult psychiatry.     Chronic hepatitis C with cirrhosis  Labs noted, will require follow up outpatient  with GI to start treatment.       Cirrhosis of liver without ascites  Labs noted, stable.  AST   Date Value Ref Range Status   01/15/2023 66 (H) 10 - 40 U/L Final   01/14/2023 58 (H) 10 - 40 U/L Final   01/13/2023 45 (H) 10 - 40 U/L Final   01/12/2023 46 (H) 10 - 40 U/L Final   01/11/2023 45 (H) 10 - 40 U/L Final   01/11/2023 45 (H) 10 - 40 U/L Final     ALT   Date Value Ref Range Status   01/15/2023 34 10 - 44 U/L Final   01/14/2023 28 10 - 44 U/L Final   01/13/2023 23 10 - 44 U/L Final   01/12/2023 23 10 - 44 U/L Final   01/11/2023 20 10 - 44 U/L Final   01/11/2023 20 10 - 44 U/L Final        Alkaline Phosphatase   Date Value Ref Range Status   01/15/2023 149 (H) 55 - 135 U/L Final   01/14/2023 137 (H) 55 - 135 U/L Final   01/13/2023 127 55 - 135 U/L Final   01/12/2023 132 55 - 135 U/L Final   01/11/2023 131 55 - 135 U/L Final   01/11/2023 131 55 - 135 U/L Final            Pancytopenia  Counts are stable at this time.  Two days of leukocyte count dropping 2.39 to 1.76 and 1.42.   Overnight febrile event >101F, patient however feels better compared to yesterday evening.   1/11: Morning, vital signs stable, no tachycardia, BP normotensive.   - f/u blood cultures, NGTD x 1 day  - resumed empiric antibiotics, merrem after two days of elevated temperature >101F and downtrending ANC (900/ul)  1/13 drop in ANC to 900/ul, patient without symptoms, vital signs stable    Patient has a leukocytosis.  Last trend as follows-   Lab Results   Component Value Date    WBC 2.61 (L) 01/15/2023    WBC 2.54 (L) 01/14/2023    WBC 1.71 (LL) 01/13/2023     Recent Labs   Lab 01/13/23  0529 01/14/23  0615 01/15/23  0424   Hemoglobin 8.2 L 8.6 L 9.9 L             Final Active Diagnoses:    Diagnosis Date Noted POA    PRINCIPAL PROBLEM:  Debility [R53.81] 01/05/2023 No    Anxiety and depression [F41.9, F32.A] 01/12/2023 Yes    Pulmonary hypertension [I27.20] 12/24/2022 Yes    Murmur, cardiac [R01.1] 12/23/2022 Yes    Severe sepsis  [A41.9, R65.20] 12/23/2022 Yes    Mild episode of recurrent major depressive disorder [F33.0] 02/09/2022 Yes     Chronic    Amphetamine use disorder, severe [F15.20] 04/15/2021 Yes    Cannabis use disorder, moderate, dependence [F12.20] 04/15/2021 Yes    Spinal stenosis at L4-L5 level [M48.061] 04/14/2021 Yes    Substance use disorder [F19.90] 03/15/2017 Yes    Chronic hepatitis C with cirrhosis [B18.2, K74.60] 01/08/2017 Yes    Cirrhosis of liver without ascites [K74.60] 12/06/2016 Yes    Pancytopenia [D61.818] 03/04/2015 Yes      Problems Resolved During this Admission:    Diagnosis Date Noted Date Resolved POA    Pulmonary hyperinflation [R09.89] 12/24/2022 12/24/2022 Unknown    UTI (urinary tract infection) [N39.0] 03/04/2015 01/06/2023 Yes       Discharged Condition: fair    Disposition: Home or Self Care    Follow Up:   Follow-up Information     Dannielle Merino DO Follow up on 1/9/2023.    Specialty: Family Medicine  Why: 2:00pm.  Contact information:  [address]  [address] [place] [id number]  [phone]             Gastroenterology Follow up in 1 week(s).    Why: [facility] will call patient with appointment.                     Patient Instructions:      Ambulatory referral/consult to Physical/Occupational Therapy   Standing Status: Future   Referral Priority: Routine Referral Type: Physical Medicine   Referral Reason: Specialty Services Required   Number of Visits Requested: 1       Significant Diagnostic Studies: Labs: All labs within the past 24 hours have been reviewed    Pending Diagnostic Studies:     None         Medications:  Transfer Medications (for Discharge Readmit only):   Current Facility-Administered Medications   Medication Dose Route Frequency Provider Last Rate Last Admin    ALPRAZolam tablet 0.25 mg  0.25 mg Oral TID PRN Dannielle Merino DO   0.25 mg at 01/15/23 0851    amoxicillin capsule 1,000 mg  1,000 mg Oral Q12H Dannielle Merino DO   1,000  mg at 01/15/23 0852    buPROPion TB24 tablet 300 mg  300 mg Oral Daily Micky Perkins, NP   300 mg at 01/15/23 0852    gabapentin capsule 400 mg  400 mg Oral TID Micky Perkins, NP   400 mg at 01/15/23 0852    gadobutroL (GADAVIST) injection 10 mL  10 mL Intravenous ONCE PRN Dannielle Merino,         HYDROcodone-acetaminophen 5-325 mg per tablet 1 tablet  1 tablet Oral Q6H PRN Eleazar Livingston DO   1 tablet at 01/15/23 0852    ibuprofen tablet 400 mg  400 mg Oral Q6H PRN Nataliya Cortés MD   400 mg at 01/13/23 0930    lactulose 20 gram/30 mL solution Soln 20 g  20 g Oral TID Eleazar Livingston, DO   20 g at 01/14/23 0956    LORazepam (ATIVAN) injection 0.5 mg  0.5 mg Intravenous Q1H PRN Dannielle Merino,         magnesium hydroxide 400 mg/5 ml suspension 2,400 mg  30 mL Oral Daily PRN Hal Barger Jr., MD   2,400 mg at 12/27/22 0649    magnesium oxide tablet 400 mg  400 mg Oral Daily Dannielle Merino, DO   400 mg at 01/15/23 0852    melatonin tablet 6 mg  6 mg Oral Nightly PRN Nataliya Cortés MD        pantoprazole EC tablet 40 mg  40 mg Oral Daily Sai DARNELL Red III, MD   40 mg at 01/15/23 0852    polyethylene glycol packet 17 g  17 g Oral Daily Hal Barger Jr., MD   17 g at 01/14/23 0956    potassium chloride SA CR tablet 20 mEq  20 mEq Oral Daily Dannielle Merino, DO   20 mEq at 01/15/23 0852    promethazine suppository 25 mg  25 mg Rectal Q6H PRN Nataliya Cortés MD           Indwelling Lines/Drains at time of discharge:   Lines/Drains/Airways     None                 Time spent on the discharge of patient: 60 minutes         Hal Barger Jr, MD  Department of Hospital Medicine  [facility]

## 2023-01-15 NOTE — PROVIDER TRANSFER
Outside Transfer Acceptance Note / Regional Referral Center    Referring facility: [facility]  Referring provider: CAMILA LOVE  Accepting facility: [facility]  Accepting provider: ALOK GARCÍA  Reason for transfer: Higher Level of Care   Transfer diagnosis: Lower Extremety Weakness  Transfer specialty requested: Neurosurgery  Transfer specialty notified: yes  Transfer level: NUMBER 1-5: 2  Isolation status: No active isolations   Admission class or status: IP- Inpatient    Narrative     42-year-old woman with pertinent PMH IV drug use (methamphetamine), chronic HCV with cirrhosis, spinal fusion (04/2021), epidural abscess with removal of hardware (02/2022), spinal fusion with hardware (T10 - Pelvis / 03/2022), obesity (BMI 39.3) and neurogenic bladder admitted to [facility] on 12/23 with back pain and increasing weakness in the lower extremities.      Urine and BC from admission were pos for ESBL E coli which was treated with ertapenem.  Repeat BC on 12/28 and 1/10 have been neg.  Patient started on physical therapy but weakness in the lower extremities has increased, and the patient is no longer able to walk. She is able to urinate intermittently and defecate.  Babinski's are pos bilaterally.      MRI cervical spine pos for multilevel spondylosis.  MRI brain with nonspecific findings.  X-ray of spine pos for multilevel thoracolumbar fusion and diskectomy.  Also, a focal kyphosis at T9-10 increased when compared to prior scoliosis radiographs.  An MRI of the spine was not available due to the patient's size.    She is afebrile. She was transitioned to suppressive antibiotics (amoxicillin) which she has been taking due to hx of infected hardware in her back    Transfer requested for advanced imaging (MRI spine which is not available at the referring facility due to the patient's size) and neurosurgical consultation. Transfer diagnosis LE weakness with concern  for spinal cord compression/ hardware issue. Referring providers have spoken to [facility] (Tari Templeton and Charli). Dr Augustin said she will consult and requested  admission.      Instructions      Roldan Ca-  Admit to Hospital Medicine  Upon patient arrival to floor, please send SecureChat to [facility] or call extension [phone] (if no answer, do NOT leave a callback number after the beep, rather please send a SecureChat to [facility]), for Hospital Medicine admit team assignment and for additional admit orders for the patient.  Do not page the attending physician associated with the patient on arrival (this physician may not be on duty at the time of arrival).  Rather, always send a SecureChat to [facility] or call [phone] to reach the triage physician for orders and team assignment.

## 2023-01-15 NOTE — ASSESSMENT & PLAN NOTE
Labs noted, stable.  AST   Date Value Ref Range Status   01/15/2023 66 (H) 10 - 40 U/L Final   01/14/2023 58 (H) 10 - 40 U/L Final   01/13/2023 45 (H) 10 - 40 U/L Final   01/12/2023 46 (H) 10 - 40 U/L Final   01/11/2023 45 (H) 10 - 40 U/L Final   01/11/2023 45 (H) 10 - 40 U/L Final     ALT   Date Value Ref Range Status   01/15/2023 34 10 - 44 U/L Final   01/14/2023 28 10 - 44 U/L Final   01/13/2023 23 10 - 44 U/L Final   01/12/2023 23 10 - 44 U/L Final   01/11/2023 20 10 - 44 U/L Final   01/11/2023 20 10 - 44 U/L Final        Alkaline Phosphatase   Date Value Ref Range Status   01/15/2023 149 (H) 55 - 135 U/L Final   01/14/2023 137 (H) 55 - 135 U/L Final   01/13/2023 127 55 - 135 U/L Final   01/12/2023 132 55 - 135 U/L Final   01/11/2023 131 55 - 135 U/L Final   01/11/2023 131 55 - 135 U/L Final

## 2023-01-15 NOTE — ASSESSMENT & PLAN NOTE
Check xray of back due to pain, increase in scoliosis.   1/10 given proximal muscle weakness with slow improvement with daily PT and OT, will follow up MRI brain and spine  Limitations with facility's imagining capabilities, cannot rule out cord compression over thoracic and lumbar spine.   [facility] neurosurgery has accepted return of patient to their service for further evaluation. Currently on hospital wide diversion.     1/15: [facility] does have a bed available will transfer for higher level of care at this time.    MRI brain and c spine below.  Cannot r/u cord compression over thoracic and lumbar spine.  Continue with plan for transfer.     MRI BRAIN WITHOUT CONTRAST     CLINICAL HISTORY:  Debility, generalized weakness and pain     COMPARISON:  MRI brain 02/14/2022     TECHNIQUE:  Multiplanar multisequence MR imaging performed of the brain without contrast     FINDINGS:  No detected diffusion signal changes to suggest recent ischemia.  No ventricular or basal cistern effacement.  No midline shift or mass effect.  Major intracranial flow voids appear intact.  Paranasal sinuses and mastoid air cells are clear.  Calvarial signal is intact.  No signal change to suggest recent or remote hemorrhage.  Prominent gliotic nonspecific white matter signal change in the subcortical and periventricular regions of bilateral cerebral hemispheres unchanged in extent and distribution compared to the prior study.  Corpus callosum, pituitary gland, and remaining midline structures are unremarkable.     Impression:     1. No acute intracranial abnormality identified.  2. Nonspecific gliotic white matter signal change within bilateral cerebral hemispheres similar in extent and distribution greater than expected for patient age.  This may be related to chronic small vessel ischemia.  Numerous chronic systemic illnesses can result in this appearance.  Demyelinating disease is also in the differential.        Electronically  signed by: Deion Patel MD  Date:                                            01/10/2023  Time:                                           14:55    Narrative & Impression  EXAMINATION:  MRI CERVICAL SPINE WITHOUT CONTRAST     CLINICAL HISTORY:  Generalized weakness     COMPARISON:  No priors available     TECHNIQUE:  Multiplanar multisequence MR imaging performed of the cervical spine without contrast     FINDINGS:  Imaged portions of the spinal cord demonstrate normal size and signal.  Vertebral bodies are normal in height and alignment.  Artifact present on portions of this exam.  Degenerative related endplate signal changes at C4 through C7.  Marginal osteophytes are present at these levels.     C2-C3: Disc normal in height without herniation.  No spinal canal or foraminal narrowing detected.     C3-C4: Slight loss of disc space height without significant herniation.  No spinal canal or left foraminal narrowing.  Mild right foraminal narrowing related to uncovertebral osteophyte.     C4-C5: Loss of disc space height with broad-based disc osteophyte.  Disc osteophyte approximates the anterior aspect of the cord right of midline.  Artifact limits evaluation.  The may be trace indentation of the cord.  AP diameter of the thecal sac measures 6 mm in this region.  Neural foramen are not well evaluated secondary to artifact.     C5-C6: Loss of disc space height with broad-based disc osteophyte resulting in mild indentation on the anterior thecal sac approximating the anterior cord without definite cord flattening.  AP diameter of the thecal sac measures just over 8 mm.  Neural foramen are not well evaluated secondary to artifact.  Prominent uncovertebral osteophytes do however appear to be present.     C6-C7: Loss of disc space height with mild broad-based disc osteophyte resulting in mild indentation on the anterior thecal sac, but no cord flattening.  AP diameter of the thecal sac measures 8.5 mm.  Artifact limits  evaluation of the neural foramen, but prominent bilateral uncovertebral osteophytes appear to be present.     C7-T1: Maintained disc space height with minimal protrusion at the left lateral recess resulting in no significant spinal canal narrowing.  Limited evaluation of the neural foramen secondary to artifact.     Impression:     Suboptimal study secondary to artifact on portions of this exam.  Multilevel spondylosis is present extending from C3-C7 with individual levels detailed above.        Electronically signed by: Deion Patel MD  Date:                                            01/10/2023  Time:                                           15:01

## 2023-01-15 NOTE — ASSESSMENT & PLAN NOTE
Lower extremity weakness bilaterally now requiring aggressive OT and PT efforts to prevent further weakness and also regain strength necessary to carry out ADLs.   - From early bed bound state, patient has required multiple nursing staff in safely getting in and out of bed   - Original discharge plans with outpatient rehab will cause more harm to patient at this time and case management working to find a placement at SNF with PT and OT efforts  1/9 continue efforts with PT and OT, awaiting placement  1/10 slow progress with PT and OT, will obtain MRI brain and spine to identify any concerning changes  1/11 MRI limitations to TL spine study secondary to hardware, brain and cervical scan noting no acute findings;   1. No acute intracranial abnormality identified.  2. Nonspecific gliotic white matter signal change within bilateral cerebral hemispheres similar in extent and distribution greater than expected for patient age. This may be related to chronic small vessel ischemia.  Numerous chronic systemic illnesses can result in this appearance. Demyelinating disease is also in the differential.  Multiple prolonged hospitalization, neurosurgical intervention, chronic inflammation, substance abuse all likely contributing to current state. Outpatient follow up with specialists have been interrupted with hospitalizations and inconclusive studies. Will follow up with neurology for evaluation and optimize current therapy.    1/13 Awaiting main [place] diversion to be lifted for neurosurgery transfer  1/14:  Awaiting transfer.  1/15:  Patient has been accepted to [facility] will transfer for neurosurgery evaluation and imaging.

## 2023-01-16 LAB
ALBUMIN SERPL BCP-MCNC: 2 G/DL (ref 3.5–5.2)
ALP SERPL-CCNC: 143 U/L (ref 55–135)
ALT SERPL W/O P-5'-P-CCNC: 33 U/L (ref 10–44)
ANION GAP SERPL CALC-SCNC: 6 MMOL/L (ref 8–16)
AST SERPL-CCNC: 59 U/L (ref 10–40)
BACTERIA BLD CULT: NORMAL
BACTERIA BLD CULT: NORMAL
BASOPHILS # BLD AUTO: 0.03 K/UL (ref 0–0.2)
BASOPHILS NFR BLD: 1 % (ref 0–1.9)
BILIRUB SERPL-MCNC: 1.2 MG/DL (ref 0.1–1)
BUN SERPL-MCNC: 13 MG/DL (ref 6–20)
CALCIUM SERPL-MCNC: 8.3 MG/DL (ref 8.7–10.5)
CHLORIDE SERPL-SCNC: 107 MMOL/L (ref 95–110)
CO2 SERPL-SCNC: 26 MMOL/L (ref 23–29)
CREAT SERPL-MCNC: 0.5 MG/DL (ref 0.5–1.4)
DIFFERENTIAL METHOD: ABNORMAL
EOSINOPHIL # BLD AUTO: 0.1 K/UL (ref 0–0.5)
EOSINOPHIL NFR BLD: 4.7 % (ref 0–8)
ERYTHROCYTE [DISTWIDTH] IN BLOOD BY AUTOMATED COUNT: 16.7 % (ref 11.5–14.5)
EST. GFR  (NO RACE VARIABLE): >60 ML/MIN/1.73 M^2
GLUCOSE SERPL-MCNC: 118 MG/DL (ref 70–110)
HCT VFR BLD AUTO: 29.5 % (ref 37–48.5)
HGB BLD-MCNC: 9.5 G/DL (ref 12–16)
IMM GRANULOCYTES # BLD AUTO: 0.01 K/UL (ref 0–0.04)
IMM GRANULOCYTES NFR BLD AUTO: 0.3 % (ref 0–0.5)
LYMPHOCYTES # BLD AUTO: 0.6 K/UL (ref 1–4.8)
LYMPHOCYTES NFR BLD: 19.9 % (ref 18–48)
MAGNESIUM SERPL-MCNC: 1.8 MG/DL (ref 1.6–2.6)
MCH RBC QN AUTO: 27.5 PG (ref 27–31)
MCHC RBC AUTO-ENTMCNC: 32.2 G/DL (ref 32–36)
MCV RBC AUTO: 86 FL (ref 82–98)
MONOCYTES # BLD AUTO: 0.3 K/UL (ref 0.3–1)
MONOCYTES NFR BLD: 11.3 % (ref 4–15)
NEUTROPHILS # BLD AUTO: 1.9 K/UL (ref 1.8–7.7)
NEUTROPHILS NFR BLD: 62.8 % (ref 38–73)
NRBC BLD-RTO: 0 /100 WBC
PLATELET # BLD AUTO: 125 K/UL (ref 150–450)
PMV BLD AUTO: 8.5 FL (ref 9.2–12.9)
POTASSIUM SERPL-SCNC: 3.9 MMOL/L (ref 3.5–5.1)
PROT SERPL-MCNC: 7.6 G/DL (ref 6–8.4)
RBC # BLD AUTO: 3.45 M/UL (ref 4–5.4)
SODIUM SERPL-SCNC: 139 MMOL/L (ref 136–145)
WBC # BLD AUTO: 3.01 K/UL (ref 3.9–12.7)

## 2023-01-16 PROCEDURE — 99900031 HC PATIENT EDUCATION (STAT)

## 2023-01-16 PROCEDURE — 11000001 HC ACUTE MED/SURG PRIVATE ROOM

## 2023-01-16 PROCEDURE — 83735 ASSAY OF MAGNESIUM: CPT | Performed by: INTERNAL MEDICINE

## 2023-01-16 PROCEDURE — 63600175 PHARM REV CODE 636 W HCPCS: Performed by: STUDENT IN AN ORGANIZED HEALTH CARE EDUCATION/TRAINING PROGRAM

## 2023-01-16 PROCEDURE — 97530 THERAPEUTIC ACTIVITIES: CPT

## 2023-01-16 PROCEDURE — 99233 PR SUBSEQUENT HOSPITAL CARE,LEVL III: ICD-10-PCS | Mod: ,,, | Performed by: STUDENT IN AN ORGANIZED HEALTH CARE EDUCATION/TRAINING PROGRAM

## 2023-01-16 PROCEDURE — 97110 THERAPEUTIC EXERCISES: CPT | Mod: CO

## 2023-01-16 PROCEDURE — 25000003 PHARM REV CODE 250: Performed by: INTERNAL MEDICINE

## 2023-01-16 PROCEDURE — 99900035 HC TECH TIME PER 15 MIN (STAT)

## 2023-01-16 PROCEDURE — 97530 THERAPEUTIC ACTIVITIES: CPT | Mod: CO

## 2023-01-16 PROCEDURE — 27000207 HC ISOLATION

## 2023-01-16 PROCEDURE — 25000003 PHARM REV CODE 250: Performed by: STUDENT IN AN ORGANIZED HEALTH CARE EDUCATION/TRAINING PROGRAM

## 2023-01-16 PROCEDURE — 99233 SBSQ HOSP IP/OBS HIGH 50: CPT | Mod: ,,, | Performed by: STUDENT IN AN ORGANIZED HEALTH CARE EDUCATION/TRAINING PROGRAM

## 2023-01-16 PROCEDURE — 25000003 PHARM REV CODE 250: Performed by: PSYCHIATRY & NEUROLOGY

## 2023-01-16 PROCEDURE — 85025 COMPLETE CBC W/AUTO DIFF WBC: CPT | Performed by: INTERNAL MEDICINE

## 2023-01-16 PROCEDURE — 36415 COLL VENOUS BLD VENIPUNCTURE: CPT | Performed by: INTERNAL MEDICINE

## 2023-01-16 PROCEDURE — 80053 COMPREHEN METABOLIC PANEL: CPT | Performed by: INTERNAL MEDICINE

## 2023-01-16 RX ORDER — HYDROCODONE BITARTRATE AND ACETAMINOPHEN 10; 325 MG/1; MG/1
1 TABLET ORAL EVERY 6 HOURS PRN
Status: DISCONTINUED | OUTPATIENT
Start: 2023-01-16 | End: 2023-01-19 | Stop reason: HOSPADM

## 2023-01-16 RX ADMIN — PANTOPRAZOLE SODIUM 40 MG: 40 TABLET, DELAYED RELEASE ORAL at 09:01

## 2023-01-16 RX ADMIN — GABAPENTIN 400 MG: 400 CAPSULE ORAL at 09:01

## 2023-01-16 RX ADMIN — POTASSIUM CHLORIDE 20 MEQ: 1500 TABLET, EXTENDED RELEASE ORAL at 09:01

## 2023-01-16 RX ADMIN — AMOXICILLIN 1000 MG: 250 CAPSULE ORAL at 09:01

## 2023-01-16 RX ADMIN — HYDROCODONE BITARTRATE AND ACETAMINOPHEN 1 TABLET: 5; 325 TABLET ORAL at 04:01

## 2023-01-16 RX ADMIN — HYDROCODONE BITARTRATE AND ACETAMINOPHEN 1 TABLET: 5; 325 TABLET ORAL at 09:01

## 2023-01-16 RX ADMIN — BUPROPION HYDROCHLORIDE 300 MG: 150 TABLET, EXTENDED RELEASE ORAL at 09:01

## 2023-01-16 RX ADMIN — MAGNESIUM OXIDE TAB 400 MG (241.3 MG ELEMENTAL MG) 400 MG: 400 (241.3 MG) TAB at 09:01

## 2023-01-16 RX ADMIN — LORAZEPAM 1 MG: 2 INJECTION INTRAMUSCULAR; INTRAVENOUS at 10:01

## 2023-01-16 RX ADMIN — GABAPENTIN 400 MG: 400 CAPSULE ORAL at 02:01

## 2023-01-16 RX ADMIN — AMOXICILLIN 1000 MG: 250 CAPSULE ORAL at 11:01

## 2023-01-16 RX ADMIN — HYDROCODONE BITARTRATE AND ACETAMINOPHEN 1 TABLET: 5; 325 TABLET ORAL at 02:01

## 2023-01-16 RX ADMIN — ALPRAZOLAM 0.25 MG: 0.25 TABLET ORAL at 04:01

## 2023-01-16 NOTE — PLAN OF CARE
Problem: Physical Therapy  Goal: Physical Therapy Goal  Description: Goals to be met by: 2023     Patient will increase functional independence with mobility by performin. Supine to sit with Modified [place]  2. Sit to supine with Modified [place]  3. Sit to stand transfer with Stand-by Assistance  4. Bed to chair transfer with Stand-by Assistance using Rolling Walker  5. Gait  x 50 feet with Contact Guard Assistance using Rolling Walker.   6. Sitting at edge of bed x10 minutes with [place] to perform (B) LE therex      Outcome: Ongoing, Not Progressing (limited by pain)

## 2023-01-16 NOTE — PLAN OF CARE
Problem: Occupational Therapy  Goal: Occupational Therapy Goal  Description: Goals to be met by: 01/20/22     Patient will increase functional independence with ADLs by performing:    UE Dressing with Set-up Assistance.  LE Dressing with Minimal Assistance.  Grooming while seated with Modified [place].  Toileting from toilet with Supervision for hygiene and clothing management.   Bathing from  shower chair/bench with Minimal Assistance.  Step transfer with Minimal Assistance  Toilet transfer to toilet with Minimal Assistance.  Increased functional strength to 4/5 for bilateral UE's.    Outcome: Ongoing, Progressing   Continue with POC

## 2023-01-16 NOTE — PT/OT/SLP PROGRESS
Physical Therapy Treatment    Patient Name:  Rachel Zazueta   MRN:  [id number]    Recommendations:     Discharge Recommendations: acute care hospital  Discharge Equipment Recommendations: wheelchair  Barriers to discharge:  current medical status and functional status    Assessment:     Rachel Zazueta is a 42 y.o. female admitted with a medical diagnosis of Debility.  She presents with the following impairments/functional limitations: weakness, impaired self care skills, impaired functional mobility, gait instability, impaired balance, decreased upper extremity function, decreased lower extremity function, decreased safety awareness, pain, impaired cardiopulmonary response to activity, edema, decreased ROM Pt initially agreeable with mobility activity with pt able to tolerate initial LE AAROM and rolling side to side. Pt able to log roll and complete transitional movement to sitting with pt reporting of increase pain to low back with pt requesting to stop mobility activity.       Rehab Prognosis: Fair; patient would benefit from acute skilled PT services to address these deficits and reach maximum level of function.    Recent Surgery: * No surgery found *      Plan:     During this hospitalization, patient to be seen 5 x/week to address the identified rehab impairments via gait training, therapeutic activities, therapeutic exercises and progress toward the following goals:    Plan of Care Expires:  01/26/23    Subjective     Chief Complaint: I would like to try but it hurts  Patient/Family Comments/goals: to be able to get up  Pain/Comfort:  Pain Rating 1: 10/10  Location - Side 1: Bilateral  Location - Orientation 1: lower  Location 1: back  Pain Addressed 1: Reposition, Cessation of Activity  Pain Rating Post-Intervention 1: 9/10      Objective:     Communicated with patient and nursing prior to session.  Patient found supine with PureWick upon PT entry to room.     General Precautions: Standard,  fall  Orthopedic Precautions: N/A  Braces: N/A       Functional Mobility:  Bed Mobility:     Rolling Left:  maximal assistance  Rolling Right: maximal assistance  Supine to Sit: maximal assistance      AM-PAC 6 CLICK MOBILITY  Turning over in bed (including adjusting bedclothes, sheets and blankets)?: 2  Sitting down on and standing up from a chair with arms (e.g., wheelchair, bedside commode, etc.): 1  Moving from lying on back to sitting on the side of the bed?: 1  Moving to and from a bed to a chair (including a wheelchair)?: 1  Need to walk in hospital room?: 1  Climbing 3-5 steps with a railing?: 1  Basic Mobility Total Score: 7       Treatment & Education:  Pt able to perform log rolling and LE AAROM with activity limited by pain    Patient left supine with all lines intact and call button in reach..    GOALS:   Multidisciplinary Problems       Physical Therapy Goals          Problem: Physical Therapy    Goal Priority Disciplines Outcome Goal Variances Interventions   Physical Therapy Goal     PT, PT/OT Ongoing, Not Progressing     Description: Goals to be met by: 2023     Patient will increase functional independence with mobility by performin. Supine to sit with Modified [place]  2. Sit to supine with Modified [place]  3. Sit to stand transfer with Stand-by Assistance  4. Bed to chair transfer with Stand-by Assistance using Rolling Walker  5. Gait  x 50 feet with Contact Guard Assistance using Rolling Walker.   6. Sitting at edge of bed x10 minutes with [place] to perform (B) LE therex                           Time Tracking:     PT Received On: 23  PT Start Time: 1030     PT Stop Time: 1045  PT Total Time (min): 15 min     Billable Minutes: Therapeutic Activity 10    Treatment Type: Treatment  PT/PTA: PT           2023

## 2023-01-16 NOTE — PLAN OF CARE
Problem: Adult Inpatient Plan of Care  Goal: Optimal Comfort and Wellbeing  Outcome: Ongoing, Not Progressing  Goal: Readiness for Transition of Care  Outcome: Ongoing, Not Progressing     Problem: Adjustment to Illness (Sepsis/Septic Shock)  Goal: Optimal Coping  Outcome: Ongoing, Not Progressing     Problem: Balance Impairment (Functional Deficit)  Goal: Improved Balance and Postural Control  Outcome: Ongoing, Not Progressing     Problem: Coordination Impairment (Functional Deficit)  Goal: Optimal Coordination  Outcome: Ongoing, Not Progressing     Problem: Muscle Strength Impairment (Functional Deficit)  Goal: Improved Muscle Strength  Outcome: Ongoing, Not Progressing     Problem: Range of Motion Impairment (Functional Deficit)  Goal: Optimal Range of Motion  Outcome: Ongoing, Not Progressing     Problem: Sensory Impairment (Functional Deficit)  Goal: Compensation for Sensory Deficit  Outcome: Ongoing, Not Progressing         Problem: Infection  Goal: Absence of Infection Signs and Symptoms  Outcome: Ongoing, Progressing     Problem: Adult Inpatient Plan of Care  Goal: Plan of Care Review  Outcome: Ongoing, Progressing  Goal: Patient-Specific Goal (Individualized)  Outcome: Ongoing, Progressing  Goal: Absence of Hospital-Acquired Illness or Injury  Outcome: Ongoing, Progressing     Problem: Bleeding (Sepsis/Septic Shock)  Goal: Absence of Bleeding  Outcome: Ongoing, Progressing     Problem: Glycemic Control Impaired (Sepsis/Septic Shock)  Goal: Blood Glucose Level Within Desired Range  Outcome: Ongoing, Progressing     Problem: Infection Progression (Sepsis/Septic Shock)  Goal: Absence of Infection Signs and Symptoms  Outcome: Ongoing, Progressing     Problem: Nutrition Impaired (Sepsis/Septic Shock)  Goal: Optimal Nutrition Intake  Outcome: Ongoing, Progressing     Problem: Skin Injury Risk Increased  Goal: Skin Health and Integrity  Outcome: Ongoing, Progressing     Problem: Fall Injury Risk  Goal: Absence  of Fall and Fall-Related Injury  Outcome: Ongoing, Progressing

## 2023-01-16 NOTE — NURSING
Stefani, from the [facility] called to ask if this pt has changed at all.  The [facility] is still on diversion.  After telling her she has not, she gave me her number so that if she changes I can reach her - [phone].

## 2023-01-16 NOTE — NURSING
Farzana from [facility] called to ask if there were any changes on this pt.  When this nurse answered in the negative, she said they were on diversion and they would call us back when they have a bed.

## 2023-01-16 NOTE — PROGRESS NOTES
"[facility] Medicine  Progress Note    Patient Name: Rachel Zazueta  MRN: [id number]  Patient Class: IP- Inpatient   Admission Date: 12/23/2022  Length of Stay: 24 days  Attending Physician: Dannielle Merino DO  Primary Care Provider: Dannielle Merino DO        Subjective:     Principal Problem:Debility    HPI:  ER HPI:  42-year-old female with a history of anemia, anxiety, recurrent cellulitis, hepatitis C, IVDU, liver cirrhosis, cholecystectomy, kidney stones, lumbar fusion, shoulder surgery, chronic back pain comes in c/o generalized weakness, fatigue, and bilateral flank pain.  She reports that this has been going on intermittently for several days and was diagnosed with a UTI recently.  No fever.  No abdominal pain or vomiting or diarrhea.     IM HPI:  Patient is well known to our service.  Patient has a history of IV drug abuse and unfortunately has relapsed.  Patient has a history of a epidural abscess requiring a large lumbar surgery with multiple rehabilitative stays and therapy.  The patient was progressing to ambulation and recently has declined.  Patient admits to starting drugs including IV drug use again.  Patient presented to the ED with the above symptoms and she has a noticeable cardiac murmur today that we are getting a stat echocardiogram.  Patient's been started on antibiotics fluid resuscitated and seems to have sepsis.  Patient's urinary studies were abnormal but not overwhelming.    Overview/Hospital Course:  12/24/22 CG: Patient remains in the ICU today. Has a lot of muscle spasms and low back pain. Echocardiogram ordered yesterday and completed; significant for pulm HTN but without vegetations.   12/25/22 CG:  Overnight she was having a lot of significant discomfort and pain.  We placed her on Precedex and this has helped significantly with her body aches, her irritability and muscle spasms."  12/26/22 FM:  Patient required Precedex for agitation and mood changes.  " Patient is calm and cooperative this morning.  Patient appears to have E coli sepsis so will taper antibiotics.  Patient's echocardiogram showed no vegetation and E coli would be low risk for metastatic infection.  Will transfer the patient to the floor once her Precedex discontinue we counseled her again today on the importance of avoiding substances and illicit drugs.  This continues to be setback for her.  12/27/22 WC:  Patient overall remains stable and is slowly improving.  Urine culture has revealed Candida albicans in addition to blood culture revealing E coli.  12/28/22 WC:  Patient resting comfortably this morning.  Pharm ID on case for treatment duration recs.    12/29/22 WC:  no acute events overnight.  Back pain at this time.   12/20/22:  no acute events continue IV ABX for esbl  12/31/22 CG: stable, continuing antibiotics.   1/1/2 CG: stable, continuing antibiotics. Has not had a bowel movement in two weeks. Will give lactulose.    01/02/2023: Patient did have a bowel movement yesterday.  Completing last day of antimicrobial therapy anticipated discharge tomorrow.  1/3/2023: Improving BM with lactulose. Lower extremity weakness requiring aggressive PT and OT efforts. Patient understands she needs to be an active participant. Encouraged inbed physical exercises without assistance with therapists. Stable Hg/HCT.   1/4/2023: Lactulose every other day for regular bowel movement. PT and OT recommendations with continue efforts to help with lower extremity weakness which likely precipitated from minimal participation from patient over the past week to remain physically active. Hg/HCT remains stable. Last blood Cx x2 (12/28) NGTD x5 days. Patient agrees moving forward lower extremity strengthening will have to take place with her participation and motivation. Will discharge with continue PT and OT efforts outpatient. She acknowledges outpatient follow up with GI/hepatologist, hematologist and neurologist  required to address all other chronic medication conditions.   1/5/2023: Overnight report from nursing that patient exhibited weakness in lower extremities and in upper body strength to support self to safely transfer from bed to commode or even bed to wheelchair. CM involved in placement to SNF. Continue with PT and OT. All other medical condition addressed during hospitalization remains stable.  1/6/2023: CM working on SNF placement. Patient expresses continue effort on her part to regain her strength, reassured her that she has been able to participate following the leads of PT and OT and so a new injury is less likely at this time. While small, she endorsed sitting up at bedside with legs dangle.    1/7/23 ND patient feeling well this morning no complaints will continue therapy services and rehab placement  1/8/23 ND pt reports some back pain will repeat x-rays to make sure everything looks okay from previous surgery.  Otherwise she is doing well  1/9/23 Repeat xray showing increased kyphosis over TL junction. Today, pain associated with massaging feet bilaterally with no worsening of numbness and tingling. No reported concerns with PT and OT efforts. Awaiting placement.  1/10/23 Overnight elevated temperature >101F, no further discomfort at this time and on morning exam denies further symptoms and worsening pain over joints and spine. PT and OT efforts moving forward, but still has significant muscle weakness proximally and over her core. White cells reduced, CRP elevated. Will obtain MRI scan brain and spine to follow up with worsening scoliosis, identify occult findings.    1/11/23 Second evening with elevated temp >101F with chills and tachycardia. Sepsis precautions taken with labs and cultures. No obvious infectious source, but empirically started merrem for neutropenic fever precautions. Patient this morning endorses good appetite and hopeful in being able to sit up longer today. Yesterday, she was able  to stay seated 10 minutes from 3 minutes, the day prior. Pancytopenia profile back to patient's baseline. Overall pain stable and no reported worsening. Lower extremity weakness with loss of flexion at knees and hips against gravity still prominent. Will appreciate neurology consult. Continue PT and OT.   1/12/23 Neurology consult for paraparesis, differential diagnoses include demyelinating disease, ischemic changes (MRI white matter changes), spinal cord lesions (pt with history of past epidural abscesses), compressive myelopathy.   - MRI brain with white matter changes that are concerning for demyelinating disease vs ischemic change  -Thoracic spine radiographs, three views, demonstrate multilevel thoracolumbar fusion with local kyphosis at T9-10, increased when compared to scoliosis series of 04/14/2022, measuring approximately 40 degree on today's exam and 30° on prior exam  Current facility with CT imagining limitations to further determine nature of spinal compression likely affecting bilateral lower extremity, hyperreflexia and reported altered sensation to feet. Pulses intact bilaterally.   Currently awaiting diversion at main [place] to be lifted for transfer to neurosurgery service with further neurology work up.   No fever overnight. Blood cultures NGTD x 2 days. Will continue merrem for another 24 hours, then discontinue if no elevated temperature reading.   1/13/23 No fever overnight, WBC dropped again. NGTD x3 days on all blood cultures collected after febrile episodes x2, will discontinue merrem and resume suppressive amoxicillin Q12H secondary to partial hardware retention and hx of GBS. Early PM started to complain of  muscle spasms over her entire back that would release with breathing. Tizanidine started and pain control improved. Awaiting transfer to neurosurgery service for advanced imagining and neurology consult.   1/14/23 WC: patient afebrile overnight.  Blood cultures remain negative.   Tolerating transition to suppressive abx therapy with amoxicillin secondary to partial hardware retention.  Endorses ongoing weakness to bilateral lower ext.  Positive babinski bilaterally.  Transfer for advanced imaging and neuro consult.   01/15/2023:  A bed has become available at [facility] main campus.  Patient will be transferred for high-level care to include Hospital Medicine, Neurosurgery and any other potential sources she may need.  We appreciate their assistance with transfer.  1/16/23: No transfer overnight. Patient increasingly experiencing pain focal tenderness over thoracic spine. Patient increasingly agitated and anxious, tearful. Will f/u neurology and psychiatry. Awaiting transfer to care of neurosurgery at [facility].       Interval History: Patient seen and examined. Increasingly frustrated with current uncertainly with present diagnosis and with ability to regain strength in her lower extremities     Review of Systems   Constitutional:  Negative for activity change, appetite change, chills, diaphoresis, fatigue and fever.   HENT:  Negative for sore throat.    Eyes:  Negative for pain.   Respiratory:  Negative for cough, choking, chest tightness and shortness of breath.    Cardiovascular:  Negative for chest pain, palpitations and leg swelling.   Gastrointestinal:  Negative for abdominal distention, abdominal pain, constipation, diarrhea, nausea, rectal pain and vomiting.   Genitourinary:  Negative for dyspareunia, dysuria, flank pain, frequency, genital sores, hematuria and menstrual problem.   Musculoskeletal:  Positive for arthralgias, back pain, gait problem, myalgias and neck pain. Negative for neck stiffness.   Skin:  Negative for pallor, rash and wound.   Neurological:  Positive for weakness. Negative for dizziness, tremors, speech difficulty and headaches.   Psychiatric/Behavioral:  Positive for dysphoric mood and sleep disturbance. Negative for agitation, behavioral problems, confusion,  decreased concentration, self-injury and suicidal ideas. The patient is nervous/anxious.    Objective:     Vital Signs (Most Recent):  Temp: 98.5 °F (36.9 °C) (01/16/23 0731)  Pulse: 103 (01/16/23 0731)  Resp: 20 (01/16/23 0944)  BP: 114/62 (01/16/23 0731)  SpO2: 96 % (01/16/23 0731) Vital Signs (24h Range):  Temp:  [97.6 °F (36.4 °C)-98.6 °F (37 °C)] 98.5 °F (36.9 °C)  Pulse:  [103-115] 103  Resp:  [18-20] 20  SpO2:  [94 %-100 %] 96 %  BP: (106-140)/(58-77) 114/62     Weight: 110.6 kg (243 lb 12.8 oz)  Body mass index is 38.18 kg/m².    Intake/Output Summary (Last 24 hours) at 1/16/2023 1053  Last data filed at 1/16/2023 0500  Gross per 24 hour   Intake 1480 ml   Output 3500 ml   Net -2020 ml      Physical Exam  Vitals and nursing note reviewed.   Constitutional:       General: She is not in acute distress.     Appearance: She is obese.   HENT:      Head: Normocephalic and atraumatic.      Right Ear: External ear normal.      Left Ear: External ear normal.      Nose: Nose normal. No congestion or rhinorrhea.      Mouth/Throat:      Mouth: Mucous membranes are dry.      Pharynx: No oropharyngeal exudate.   Eyes:      General: No scleral icterus.     Extraocular Movements: Extraocular movements intact.      Conjunctiva/sclera: Conjunctivae normal.   Neck:      Vascular: No carotid bruit.      Comments: Limited ROM at baseline  Cardiovascular:      Rate and Rhythm: Normal rate and regular rhythm.      Heart sounds: Murmur heard.     No friction rub. No gallop.   Pulmonary:      Effort: No respiratory distress.      Breath sounds: No stridor. No wheezing, rhonchi or rales.   Chest:      Chest wall: No tenderness.   Abdominal:      General: There is no distension.      Palpations: Abdomen is soft. There is no mass.      Tenderness: There is no abdominal tenderness. There is no right CVA tenderness, left CVA tenderness or guarding.   Musculoskeletal:         General: No swelling, tenderness or deformity.      Cervical  back: Normal range of motion and neck supple. No rigidity or tenderness.      Right lower leg: No edema.      Left lower leg: No edema.   Lymphadenopathy:      Cervical: No cervical adenopathy.   Skin:     General: Skin is dry.      Capillary Refill: Capillary refill takes less than 2 seconds.      Coloration: Skin is not jaundiced or pale.      Findings: No bruising or rash.   Neurological:      Mental Status: She is oriented to person, place, and time. Mental status is at baseline.      Cranial Nerves: No cranial nerve deficit.      Sensory: Sensory deficit present.      Motor: Weakness (unable to lift legs against gravity, also unable to plant feet with hips and knees flexed bilaterally in supine, weakness more prominent on left side, no SI joint tenderness) present.      Comments: Sensation diminished in bilateral legs   Positive babinski bilaterally.    Psychiatric:         Mood and Affect: Mood normal.         Behavior: Behavior normal.         Thought Content: Thought content normal.      Comments: Cooperative, but increasingly frustrated with fear of inability to walk      Significant Labs: All pertinent labs within the past 24 hours have been reviewed.  BMP:   Recent Labs   Lab 01/16/23  0526   *      K 3.9      CO2 26   BUN 13   CREATININE 0.5   CALCIUM 8.3*   MG 1.8     CBC:   Recent Labs   Lab 01/15/23  0424 01/16/23  0526   WBC 2.61* 3.01*   HGB 9.9* 9.5*   HCT 30.5* 29.5*   * 125*     CMP:   Recent Labs   Lab 01/15/23  0424 01/16/23  0526    139   K 4.1 3.9    107   CO2 27 26   GLU 88 118*   BUN 15 13   CREATININE 0.4* 0.5   CALCIUM 8.4* 8.3*   PROT 7.9 7.6   ALBUMIN 2.1* 2.0*   BILITOT 1.3* 1.2*   ALKPHOS 149* 143*   AST 66* 59*   ALT 34 33   ANIONGAP 4* 6*     Significant Imaging: I have reviewed all pertinent imaging results/findings within the past 24 hours.      Assessment/Plan:      * Debility  Lower extremity weakness bilaterally now requiring aggressive OT  and PT efforts to prevent further weakness and also regain strength necessary to carry out ADLs.   - From early bed bound state, patient has required multiple nursing staff in safely getting in and out of bed   - Original discharge plans with outpatient rehab will cause more harm to patient at this time and case management working to find a placement at SNF with PT and OT efforts  1/9 continue efforts with PT and OT, awaiting placement  1/10 slow progress with PT and OT, will obtain MRI brain and spine to identify any concerning changes  1/11 MRI limitations to TL spine study secondary to hardware, brain and cervical scan noting no acute findings;   1. No acute intracranial abnormality identified.  2. Nonspecific gliotic white matter signal change within bilateral cerebral hemispheres similar in extent and distribution greater than expected for patient age. This may be related to chronic small vessel ischemia.  Numerous chronic systemic illnesses can result in this appearance. Demyelinating disease is also in the differential.  Multiple prolonged hospitalization, neurosurgical intervention, chronic inflammation, substance abuse all likely contributing to current state. Outpatient follow up with specialists have been interrupted with hospitalizations and inconclusive studies. Will follow up with neurology for evaluation and optimize current therapy.    1/13 Awaiting [facility] diversion to be lifted for neurosurgery transfer  1/14:  Awaiting transfer.  1/15:  Patient has been accepted to [facility] will transfer for neurosurgery evaluation and imaging.  1/16: overnight report, [facility] back on diversion, awaiting transfer    Anxiety and depression  Patient has recurrent depression which is moderate and is currently controlled. Will Continue anti-depressant medications. We will consult psychiatry at this time. Patient does not display psychosis at this time. Continue to monitor closely and adjust plan of care as  needed.  1/12 bupropion increase to 300 mg daily, gabapentin increase to 400 mg TID  1/16 anxiolytic increased to IV 1mg Q6HPRN with continued daily buproprion, gabapentin, tizanidine            Pulmonary hypertension  Found incidentally on echocardiogram with evidence of elevated right sided pressures and dilated heart chambers on the right side. Unclear the exact etiology or class of Pulm HTN but given her significant substance abuse Class 1 certainly is a possibility.   - Will refer to outpatient pulmonologist  - Will consider a cariology consult while inpatient to see if they think it would be beneficial to have a heart cath done while inpatient      Severe sepsis  This patient does have evidence of infective focus  My overall impression is sepsis. Vital signs were reviewed and noted in progress note.  Antibiotics given-   Antibiotics (From admission, onward)    Start     Stop Route Frequency Ordered    01/14/23 0900  amoxicillin capsule 1,000 mg         -- Oral Every 12 hours 01/13/23 1947    01/02/23 1515  meropenem (MERREM) 1 g in sodium chloride 0.9 % 100 mL IVPB (MB+)        See \[facility]\""pace for full Linked Orders Report.    01/03 0714 IV Every 8 hours (non-standard times) 01/02/23 0923    12/23/22 0533  vancomycin (VANCOCIN) 1,000 mg injection        Note to Pharmacy: Created by cabinet override    12/23 1744   12/23/22 0533    12/23/22 0405  piperacillin-tazobactam (ZOSYN) 4.5 gram injection        Note to Pharmacy: Created by cabinet override    12/23 1614   12/23/22 0405        Cultures were taken-   Microbiology Results (last 7 days)     Procedure Component Value Units Date/Time    Blood culture [[id number]] Collected: 01/10/23 2348    Order Status: Completed Specimen: Blood Updated: 01/16/23 0612     Blood Culture, Routine No growth after 5 days.    Blood culture [[id number]] Collected: 01/10/23 2344    Order Status: Completed Specimen: Blood Updated: 01/16/23 0612     Blood Culture, Routine No growth  after 5 days.    Blood culture [[id number]] Collected: 01/10/23 0616    Order Status: Completed Specimen: Blood Updated: 01/15/23 2012     Blood Culture, Routine No growth after 5 days.    Blood culture [[id number]] Collected: 01/10/23 0621    Order Status: Completed Specimen: Blood Updated: 01/15/23 2012     Blood Culture, Routine No growth after 5 days.    Culture, Respiratory with Gram Stain [[id number]]     Order Status: No result Specimen: Respiratory         Latest lactate reviewed, they are-  No results for input(s): LACTATE in the last 72 hours.    Organ dysfunction indicated by Acute kidney injury, Encephalopathy  and Acute liver injury  Source- ?    Source control Achieved by- see orders  - Merrem day 7 of 7 (completed on 1/3/23)  - Blood cx x2 (1/2/23) final report no growth    1/7 resolved  1/10 overnight elevated temperature reading, no worsening overall pain. Follow up Blood cultures and imagining.   1/11 second evening with elevated temperature >101F with symptoms, started Merrem  1/13 48 hours of defervescence, NGTD x 3 days on blood cultures (1/10 and 1/11), will discontinue Merrem. Per discussion with Pharm ID, will resume suppressive therapy with amoxicillin 1g BID starting 1/14 1/16 Blood cultures remain negative. Continue suppressive therapy.      Murmur, cardiac  No evidence of vegetations.     Mild episode of recurrent major depressive disorder  Resume home meds.   - lyrica discontinued  1/12 gabapentin increased to 400 mg TID  1/16 increase PRN anxiolytic      Cannabis use disorder, moderate, dependence  As above.      Amphetamine use disorder, severe  Continue to  educate and support.      Spinal stenosis at L4-L5 level  Check xray of back due to pain, increase in scoliosis.   1/10 given proximal muscle weakness with slow improvement with daily PT and OT, will follow up MRI brain and spine  Limitations with facility's imagining capabilities, cannot rule out cord compression over  thoracic and lumbar spine.   [facility] neurosurgery has accepted return of patient to their service for further evaluation. Currently on hospital wide diversion.     1/15: [facility] does have a bed available will transfer for higher level of care at this time.    MRI brain and c spine below.  Cannot r/u cord compression over thoracic and lumbar spine.  Continue with plan for transfer.     MRI BRAIN WITHOUT CONTRAST     CLINICAL HISTORY:  Debility, generalized weakness and pain     COMPARISON:  MRI brain 02/14/2022     TECHNIQUE:  Multiplanar multisequence MR imaging performed of the brain without contrast     FINDINGS:  No detected diffusion signal changes to suggest recent ischemia.  No ventricular or basal cistern effacement.  No midline shift or mass effect.  Major intracranial flow voids appear intact.  Paranasal sinuses and mastoid air cells are clear.  Calvarial signal is intact.  No signal change to suggest recent or remote hemorrhage.  Prominent gliotic nonspecific white matter signal change in the subcortical and periventricular regions of bilateral cerebral hemispheres unchanged in extent and distribution compared to the prior study.  Corpus callosum, pituitary gland, and remaining midline structures are unremarkable.     Impression:     1. No acute intracranial abnormality identified.  2. Nonspecific gliotic white matter signal change within bilateral cerebral hemispheres similar in extent and distribution greater than expected for patient age.  This may be related to chronic small vessel ischemia.  Numerous chronic systemic illnesses can result in this appearance.  Demyelinating disease is also in the differential.        Electronically signed by: Deion Patel MD  Date:                                            01/10/2023  Time:                                           14:55    Narrative & Impression  EXAMINATION:  MRI CERVICAL SPINE WITHOUT CONTRAST     CLINICAL HISTORY:  Generalized  weakness     COMPARISON:  No priors available     TECHNIQUE:  Multiplanar multisequence MR imaging performed of the cervical spine without contrast     FINDINGS:  Imaged portions of the spinal cord demonstrate normal size and signal.  Vertebral bodies are normal in height and alignment.  Artifact present on portions of this exam.  Degenerative related endplate signal changes at C4 through C7.  Marginal osteophytes are present at these levels.     C2-C3: Disc normal in height without herniation.  No spinal canal or foraminal narrowing detected.     C3-C4: Slight loss of disc space height without significant herniation.  No spinal canal or left foraminal narrowing.  Mild right foraminal narrowing related to uncovertebral osteophyte.     C4-C5: Loss of disc space height with broad-based disc osteophyte.  Disc osteophyte approximates the anterior aspect of the cord right of midline.  Artifact limits evaluation.  The may be trace indentation of the cord.  AP diameter of the thecal sac measures 6 mm in this region.  Neural foramen are not well evaluated secondary to artifact.     C5-C6: Loss of disc space height with broad-based disc osteophyte resulting in mild indentation on the anterior thecal sac approximating the anterior cord without definite cord flattening.  AP diameter of the thecal sac measures just over 8 mm.  Neural foramen are not well evaluated secondary to artifact.  Prominent uncovertebral osteophytes do however appear to be present.     C6-C7: Loss of disc space height with mild broad-based disc osteophyte resulting in mild indentation on the anterior thecal sac, but no cord flattening.  AP diameter of the thecal sac measures 8.5 mm.  Artifact limits evaluation of the neural foramen, but prominent bilateral uncovertebral osteophytes appear to be present.     C7-T1: Maintained disc space height with minimal protrusion at the left lateral recess resulting in no significant spinal canal narrowing.  Limited  evaluation of the neural foramen secondary to artifact.     Impression:     Suboptimal study secondary to artifact on portions of this exam.  Multilevel spondylosis is present extending from C3-C7 with individual levels detailed above.        Electronically signed by: Deion Patel MD  Date:                                            01/10/2023  Time:                                           15:01    Substance use disorder  Cessation advised. Follow up consult psychiatry.     Chronic hepatitis C with cirrhosis  Labs noted, will require follow up outpatient with GI to start treatment.       Cirrhosis of liver without ascites  Labs noted, stable.  AST   Date Value Ref Range Status   01/16/2023 59 (H) 10 - 40 U/L Final   01/15/2023 66 (H) 10 - 40 U/L Final   01/14/2023 58 (H) 10 - 40 U/L Final   01/13/2023 45 (H) 10 - 40 U/L Final   01/12/2023 46 (H) 10 - 40 U/L Final     ALT   Date Value Ref Range Status   01/16/2023 33 10 - 44 U/L Final   01/15/2023 34 10 - 44 U/L Final   01/14/2023 28 10 - 44 U/L Final   01/13/2023 23 10 - 44 U/L Final   01/12/2023 23 10 - 44 U/L Final        Alkaline Phosphatase   Date Value Ref Range Status   01/16/2023 143 (H) 55 - 135 U/L Final   01/15/2023 149 (H) 55 - 135 U/L Final   01/14/2023 137 (H) 55 - 135 U/L Final   01/13/2023 127 55 - 135 U/L Final   01/12/2023 132 55 - 135 U/L Final            Pancytopenia  Counts are stable at this time.  Two days of leukocyte count dropping 2.39 to 1.76 and 1.42.   Overnight febrile event >101F, patient however feels better compared to yesterday evening.   1/11: Morning, vital signs stable, no tachycardia, BP normotensive.   - f/u blood cultures, NGTD x 1 day  - resumed empiric antibiotics, merrem after two days of elevated temperature >101F and downtrending ANC (900/ul)  1/13 drop in ANC to 900/ul, patient without symptoms, vital signs stable  1/16 stable    Patient has a leukocytosis.  Last trend as follows-   Lab Results   Component Value Date     WBC 3.01 (L) 01/16/2023    WBC 2.61 (L) 01/15/2023    WBC 2.54 (L) 01/14/2023     Recent Labs   Lab 01/14/23  0615 01/15/23  0424 01/16/23  0526   Hemoglobin 8.6 L 9.9 L 9.5 L         VTE Risk Mitigation (From admission, onward)         Ordered     IP VTE HIGH RISK PATIENT  Once         12/23/22 0647     Place sequential compression device  Until discontinued         12/23/22 0647                Discharge Planning   SHIRA: 1/15/2023     Code Status: Full Code   Is the patient medically ready for discharge?:     Reason for patient still in hospital (select all that apply): Patient trending condition, Consult recommendations and Pending disposition  Discharge Plan A: Skilled Nursing Facility   Discharge Delays: None known at this time              Dannielle Merino DO  Department of Hospital Medicine   [facility]

## 2023-01-16 NOTE — ASSESSMENT & PLAN NOTE
This patient does have evidence of infective focus  My overall impression is sepsis. Vital signs were reviewed and noted in progress note.  Antibiotics given-   Antibiotics (From admission, onward)    Start     Stop Route Frequency Ordered    01/14/23 0900  amoxicillin capsule 1,000 mg         -- Oral Every 12 hours 01/13/23 1947 01/02/23 1515  meropenem (MERREM) 1 g in sodium chloride 0.9 % 100 mL IVPB (MB+)        See [facility] for [facility] Report.    01/03 0714 IV Every 8 hours (non-standard times) 01/02/23 0923    12/23/22 0533  vancomycin (VANCOCIN) 1,000 mg injection        Note to Pharmacy: Created by cabinet override    12/23 1744   12/23/22 0533    12/23/22 0405  piperacillin-tazobactam (ZOSYN) 4.5 gram injection        Note to Pharmacy: Created by cabinet override    12/23 1614   12/23/22 0405        Cultures were taken-   Microbiology Results (last 7 days)     Procedure Component Value Units Date/Time    Blood culture [[id number]] Collected: 01/10/23 2348    Order Status: Completed Specimen: Blood Updated: 01/16/23 0612     Blood Culture, Routine No growth after 5 days.    Blood culture [[id number]] Collected: 01/10/23 2344    Order Status: Completed Specimen: Blood Updated: 01/16/23 0612     Blood Culture, Routine No growth after 5 days.    Blood culture [[id number]] Collected: 01/10/23 0616    Order Status: Completed Specimen: Blood Updated: 01/15/23 2012     Blood Culture, Routine No growth after 5 days.    Blood culture [[id number]] Collected: 01/10/23 0621    Order Status: Completed Specimen: Blood Updated: 01/15/23 2012     Blood Culture, Routine No growth after 5 days.    Culture, Respiratory with Gram Stain [[id number]]     Order Status: No result Specimen: Respiratory         Latest lactate reviewed, they are-  No results for input(s): LACTATE in the last 72 hours.    Organ dysfunction indicated by Acute kidney injury, Encephalopathy  and Acute liver injury  Source- ?    Source control  Achieved by- see orders  - Merrem day 7 of 7 (completed on 1/3/23)  - Blood cx x2 (1/2/23) final report no growth    1/7 resolved  1/10 overnight elevated temperature reading, no worsening overall pain. Follow up Blood cultures and imagining.   1/11 second evening with elevated temperature >101F with symptoms, started Merrem  1/13 48 hours of defervescence, NGTD x 3 days on blood cultures (1/10 and 1/11), will discontinue Merrem. Per discussion with Pharm ID, will resume suppressive therapy with amoxicillin 1g BID starting 1/14 1/16 Blood cultures remain negative. Continue suppressive therapy.

## 2023-01-16 NOTE — ASSESSMENT & PLAN NOTE
Patient has recurrent depression which is moderate and is currently controlled. Will Continue anti-depressant medications. We will consult psychiatry at this time. Patient does not display psychosis at this time. Continue to monitor closely and adjust plan of care as needed.  1/12 bupropion increase to 300 mg daily, gabapentin increase to 400 mg TID  1/16 anxiolytic increased to IV 1mg Q6HPRN with continued daily buproprion, gabapentin, tizanidine

## 2023-01-16 NOTE — ASSESSMENT & PLAN NOTE
Labs noted, stable.  AST   Date Value Ref Range Status   01/16/2023 59 (H) 10 - 40 U/L Final   01/15/2023 66 (H) 10 - 40 U/L Final   01/14/2023 58 (H) 10 - 40 U/L Final   01/13/2023 45 (H) 10 - 40 U/L Final   01/12/2023 46 (H) 10 - 40 U/L Final     ALT   Date Value Ref Range Status   01/16/2023 33 10 - 44 U/L Final   01/15/2023 34 10 - 44 U/L Final   01/14/2023 28 10 - 44 U/L Final   01/13/2023 23 10 - 44 U/L Final   01/12/2023 23 10 - 44 U/L Final        Alkaline Phosphatase   Date Value Ref Range Status   01/16/2023 143 (H) 55 - 135 U/L Final   01/15/2023 149 (H) 55 - 135 U/L Final   01/14/2023 137 (H) 55 - 135 U/L Final   01/13/2023 127 55 - 135 U/L Final   01/12/2023 132 55 - 135 U/L Final

## 2023-01-16 NOTE — ASSESSMENT & PLAN NOTE
Resume home meds.   - lyrica discontinued  1/12 gabapentin increased to 400 mg TID  1/16 increase PRN anxiolytic

## 2023-01-16 NOTE — ASSESSMENT & PLAN NOTE
Counts are stable at this time.  Two days of leukocyte count dropping 2.39 to 1.76 and 1.42.   Overnight febrile event >101F, patient however feels better compared to yesterday evening.   1/11: Morning, vital signs stable, no tachycardia, BP normotensive.   - f/u blood cultures, NGTD x 1 day  - resumed empiric antibiotics, merrem after two days of elevated temperature >101F and downtrending ANC (900/ul)  1/13 drop in ANC to 900/ul, patient without symptoms, vital signs stable  1/16 stable    Patient has a leukocytosis.  Last trend as follows-   Lab Results   Component Value Date    WBC 3.01 (L) 01/16/2023    WBC 2.61 (L) 01/15/2023    WBC 2.54 (L) 01/14/2023     Recent Labs   Lab 01/14/23  0615 01/15/23  0424 01/16/23  0526   Hemoglobin 8.6 L 9.9 L 9.5 L

## 2023-01-16 NOTE — PT/OT/SLP PROGRESS
"Occupational Therapy   Treatment    Name: Rachel Zazueta  MRN: [id number]  Admitting Diagnosis:  Debility       Recommendations:     Discharge Recommendations: acute care hospital  Discharge Equipment Recommendations:  wheelchair  Barriers to discharge:  Inaccessible home environment, Other (Comment) (medical and functional status)    Assessment:     Rachel Zazueta is a 42 y.o. female with a medical diagnosis of Debility.  She presents with increase anxiety and fair motivation. Performance deficits affecting function are weakness, impaired endurance, impaired self care skills, impaired functional mobility, gait instability, impaired balance, impaired cognition, decreased upper extremity function, decreased lower extremity function, decreased safety awareness, pain, impaired cardiopulmonary response to activity.     Rehab Prognosis:  Fair; patient would benefit from acute skilled OT services to address these deficits and reach maximum level of function.       Plan:     Patient to be seen 5 x/week to address the above listed problems via self-care/home management, therapeutic activities, therapeutic exercises  Plan of Care Expires: 01/20/23  Plan of Care Reviewed with: patient    Subjective     Pain/Comfort:  Pain Rating 1:  (Pt said "a lot" but did not quantify on number scale)  Location - Side 1: Bilateral  Location - Orientation 1: lower  Location 1: back  Pain Addressed 1: Pre-medicate for activity, Reposition, Cessation of Activity  Pain Rating Post-Intervention 1: other (see comments) (did not quantify)    Objective:     Communicated with: OT and RN prior to session.  Patient found HOB elevated with PureWick, arterial line upon OT entry to room.    General Precautions: Standard, contact, fall    Orthopedic Precautions:N/A  Braces: N/A  Respiratory Status: Room air     [facility]up[facility]:     [facility] 16    Treatment & Education:  Patient engaged in there-ex for 2 sets x 15 reps seated  in " "the following planes: sh flex/ext, elbow flex/ext, wrist flex/ext, supination/pronation, radial/ulnar deviation, composite fist, and scapular elevation/depression to facilitate an increase in strength, endurance, overall performance with ADL/IADLs. Rest breaks needed b/w sets. Pt then completed opposition x 10 each hand with mild difficulty. Pt needed increase rest breaks this day due to increase anxious behavior and frustration with recent discharge to another facility. Pt fixated on light on ceiling and finger prints on/around light saying "its going to drive me crazy, someone needs to come fix this." COTTON encourage and attempted to change topic when pt was starting to fixate on light. Pt redirected multiple times during session. Pt voiced concerns about MD possible diagnosis if MS, per patient. Pt started showing anxious behavior after talking about possible dx; COTTON educated pt on deep breathing techniques to calm pt down and decrease anxiety. Pt completed deep breathing for ~10 min with min verbal cues needed for proper technique. MD was present beginning of session and RN present post session to administer pain meds.     Patient left HOB elevated with all lines intact, call button in reach, and RN present    GOALS:   Multidisciplinary Problems       Occupational Therapy Goals          Problem: Occupational Therapy    Goal Priority Disciplines Outcome Interventions   Occupational Therapy Goal     OT, PT/OT Ongoing, Progressing    Description: Goals to be met by: 01/20/22     Patient will increase functional independence with ADLs by performing:    UE Dressing with Set-up Assistance.  LE Dressing with Minimal Assistance.  Grooming while seated with Modified [place].  Toileting from toilet with Supervision for hygiene and clothing management.   Bathing from  shower chair/bench with Minimal Assistance.  Step transfer with Minimal Assistance  Toilet transfer to toilet with Minimal Assistance.  Increased " functional strength to 4/5 for bilateral UE's.                         Time Tracking:     OT Date of Treatment: 01/16/23  OT Start Time: 0859  OT Stop Time: 0944  OT Total Time (min): 45 min    Billable Minutes:Therapeutic Activity 18 min  Therapeutic Exercise 27 min    OT/ROSS: OT     ROSS Visit Number: 1 1/16/2023

## 2023-01-16 NOTE — ASSESSMENT & PLAN NOTE
Check xray of back due to pain, increase in scoliosis.   1/10 given proximal muscle weakness with slow improvement with daily PT and OT, will follow up MRI brain and spine  Limitations with facility's imagining capabilities, cannot rule out cord compression over thoracic and lumbar spine.   [facility] neurosurgery has accepted return of patient to their service for further evaluation. Currently on hospital wide diversion.     1/15: [facility] does have a bed available will transfer for higher level of care at this time.    MRI brain and c spine below.  Cannot r/u cord compression over thoracic and lumbar spine.  Continue with plan for transfer.     MRI BRAIN WITHOUT CONTRAST     CLINICAL HISTORY:  Debility, generalized weakness and pain     COMPARISON:  MRI brain 02/14/2022     TECHNIQUE:  Multiplanar multisequence MR imaging performed of the brain without contrast     FINDINGS:  No detected diffusion signal changes to suggest recent ischemia.  No ventricular or basal cistern effacement.  No midline shift or mass effect.  Major intracranial flow voids appear intact.  Paranasal sinuses and mastoid air cells are clear.  Calvarial signal is intact.  No signal change to suggest recent or remote hemorrhage.  Prominent gliotic nonspecific white matter signal change in the subcortical and periventricular regions of bilateral cerebral hemispheres unchanged in extent and distribution compared to the prior study.  Corpus callosum, pituitary gland, and remaining midline structures are unremarkable.     Impression:     1. No acute intracranial abnormality identified.  2. Nonspecific gliotic white matter signal change within bilateral cerebral hemispheres similar in extent and distribution greater than expected for patient age.  This may be related to chronic small vessel ischemia.  Numerous chronic systemic illnesses can result in this appearance.  Demyelinating disease is also in the differential.        Electronically  signed by: Deion Patel MD  Date:                                            01/10/2023  Time:                                           14:55    Narrative & Impression  EXAMINATION:  MRI CERVICAL SPINE WITHOUT CONTRAST     CLINICAL HISTORY:  Generalized weakness     COMPARISON:  No priors available     TECHNIQUE:  Multiplanar multisequence MR imaging performed of the cervical spine without contrast     FINDINGS:  Imaged portions of the spinal cord demonstrate normal size and signal.  Vertebral bodies are normal in height and alignment.  Artifact present on portions of this exam.  Degenerative related endplate signal changes at C4 through C7.  Marginal osteophytes are present at these levels.     C2-C3: Disc normal in height without herniation.  No spinal canal or foraminal narrowing detected.     C3-C4: Slight loss of disc space height without significant herniation.  No spinal canal or left foraminal narrowing.  Mild right foraminal narrowing related to uncovertebral osteophyte.     C4-C5: Loss of disc space height with broad-based disc osteophyte.  Disc osteophyte approximates the anterior aspect of the cord right of midline.  Artifact limits evaluation.  The may be trace indentation of the cord.  AP diameter of the thecal sac measures 6 mm in this region.  Neural foramen are not well evaluated secondary to artifact.     C5-C6: Loss of disc space height with broad-based disc osteophyte resulting in mild indentation on the anterior thecal sac approximating the anterior cord without definite cord flattening.  AP diameter of the thecal sac measures just over 8 mm.  Neural foramen are not well evaluated secondary to artifact.  Prominent uncovertebral osteophytes do however appear to be present.     C6-C7: Loss of disc space height with mild broad-based disc osteophyte resulting in mild indentation on the anterior thecal sac, but no cord flattening.  AP diameter of the thecal sac measures 8.5 mm.  Artifact limits  evaluation of the neural foramen, but prominent bilateral uncovertebral osteophytes appear to be present.     C7-T1: Maintained disc space height with minimal protrusion at the left lateral recess resulting in no significant spinal canal narrowing.  Limited evaluation of the neural foramen secondary to artifact.     Impression:     Suboptimal study secondary to artifact on portions of this exam.  Multilevel spondylosis is present extending from C3-C7 with individual levels detailed above.        Electronically signed by: Deion Patel MD  Date:                                            01/10/2023  Time:                                           15:01

## 2023-01-16 NOTE — ASSESSMENT & PLAN NOTE
Lower extremity weakness bilaterally now requiring aggressive OT and PT efforts to prevent further weakness and also regain strength necessary to carry out ADLs.   - From early bed bound state, patient has required multiple nursing staff in safely getting in and out of bed   - Original discharge plans with outpatient rehab will cause more harm to patient at this time and case management working to find a placement at SNF with PT and OT efforts  1/9 continue efforts with PT and OT, awaiting placement  1/10 slow progress with PT and OT, will obtain MRI brain and spine to identify any concerning changes  1/11 MRI limitations to TL spine study secondary to hardware, brain and cervical scan noting no acute findings;   1. No acute intracranial abnormality identified.  2. Nonspecific gliotic white matter signal change within bilateral cerebral hemispheres similar in extent and distribution greater than expected for patient age. This may be related to chronic small vessel ischemia.  Numerous chronic systemic illnesses can result in this appearance. Demyelinating disease is also in the differential.  Multiple prolonged hospitalization, neurosurgical intervention, chronic inflammation, substance abuse all likely contributing to current state. Outpatient follow up with specialists have been interrupted with hospitalizations and inconclusive studies. Will follow up with neurology for evaluation and optimize current therapy.    1/13 Awaiting [facility] diversion to be lifted for neurosurgery transfer  1/14:  Awaiting transfer.  1/15:  Patient has been accepted to [facility] will transfer for neurosurgery evaluation and imaging.  1/16: overnight report, [facility] back on diversion, awaiting transfer

## 2023-01-16 NOTE — SUBJECTIVE & OBJECTIVE
Interval History: Patient seen and examined. Increasingly frustrated with current uncertainly with present diagnosis and with ability to regain strength in her lower extremities     Review of Systems   Constitutional:  Negative for activity change, appetite change, chills, diaphoresis, fatigue and fever.   HENT:  Negative for sore throat.    Eyes:  Negative for pain.   Respiratory:  Negative for cough, choking, chest tightness and shortness of breath.    Cardiovascular:  Negative for chest pain, palpitations and leg swelling.   Gastrointestinal:  Negative for abdominal distention, abdominal pain, constipation, diarrhea, nausea, rectal pain and vomiting.   Genitourinary:  Negative for dyspareunia, dysuria, flank pain, frequency, genital sores, hematuria and menstrual problem.   Musculoskeletal:  Positive for arthralgias, back pain, gait problem, myalgias and neck pain. Negative for neck stiffness.   Skin:  Negative for pallor, rash and wound.   Neurological:  Positive for weakness. Negative for dizziness, tremors, speech difficulty and headaches.   Psychiatric/Behavioral:  Positive for dysphoric mood and sleep disturbance. Negative for agitation, behavioral problems, confusion, decreased concentration, self-injury and suicidal ideas. The patient is nervous/anxious.    Objective:     Vital Signs (Most Recent):  Temp: 98.5 °F (36.9 °C) (01/16/23 0731)  Pulse: 103 (01/16/23 0731)  Resp: 20 (01/16/23 0944)  BP: 114/62 (01/16/23 0731)  SpO2: 96 % (01/16/23 0731) Vital Signs (24h Range):  Temp:  [97.6 °F (36.4 °C)-98.6 °F (37 °C)] 98.5 °F (36.9 °C)  Pulse:  [103-115] 103  Resp:  [18-20] 20  SpO2:  [94 %-100 %] 96 %  BP: (106-140)/(58-77) 114/62     Weight: 110.6 kg (243 lb 12.8 oz)  Body mass index is 38.18 kg/m².    Intake/Output Summary (Last 24 hours) at 1/16/2023 1053  Last data filed at 1/16/2023 0500  Gross per 24 hour   Intake 1480 ml   Output 3500 ml   Net -2020 ml      Physical Exam  Vitals and nursing note  reviewed.   Constitutional:       General: She is not in acute distress.     Appearance: She is obese.   HENT:      Head: Normocephalic and atraumatic.      Right Ear: External ear normal.      Left Ear: External ear normal.      Nose: Nose normal. No congestion or rhinorrhea.      Mouth/Throat:      Mouth: Mucous membranes are dry.      Pharynx: No oropharyngeal exudate.   Eyes:      General: No scleral icterus.     Extraocular Movements: Extraocular movements intact.      Conjunctiva/sclera: Conjunctivae normal.   Neck:      Vascular: No carotid bruit.      Comments: Limited ROM at baseline  Cardiovascular:      Rate and Rhythm: Normal rate and regular rhythm.      Heart sounds: Murmur heard.     No friction rub. No gallop.   Pulmonary:      Effort: No respiratory distress.      Breath sounds: No stridor. No wheezing, rhonchi or rales.   Chest:      Chest wall: No tenderness.   Abdominal:      General: There is no distension.      Palpations: Abdomen is soft. There is no mass.      Tenderness: There is no abdominal tenderness. There is no right CVA tenderness, left CVA tenderness or guarding.   Musculoskeletal:         General: No swelling, tenderness or deformity.      Cervical back: Normal range of motion and neck supple. No rigidity or tenderness.      Right lower leg: No edema.      Left lower leg: No edema.   Lymphadenopathy:      Cervical: No cervical adenopathy.   Skin:     General: Skin is dry.      Capillary Refill: Capillary refill takes less than 2 seconds.      Coloration: Skin is not jaundiced or pale.      Findings: No bruising or rash.   Neurological:      Mental Status: She is oriented to person, place, and time. Mental status is at baseline.      Cranial Nerves: No cranial nerve deficit.      Sensory: Sensory deficit present.      Motor: Weakness (unable to lift legs against gravity, also unable to plant feet with hips and knees flexed bilaterally in supine, weakness more prominent on left side,  no SI joint tenderness) present.      Comments: Sensation diminished in bilateral legs   Positive babinski bilaterally.    Psychiatric:         Mood and Affect: Mood normal.         Behavior: Behavior normal.         Thought Content: Thought content normal.      Comments: Cooperative, but increasingly frustrated with fear of inability to walk      Significant Labs: All pertinent labs within the past 24 hours have been reviewed.  BMP:   Recent Labs   Lab 01/16/23 0526   *      K 3.9      CO2 26   BUN 13   CREATININE 0.5   CALCIUM 8.3*   MG 1.8     CBC:   Recent Labs   Lab 01/15/23  0424 01/16/23  0526   WBC 2.61* 3.01*   HGB 9.9* 9.5*   HCT 30.5* 29.5*   * 125*     CMP:   Recent Labs   Lab 01/15/23  0424 01/16/23  0526    139   K 4.1 3.9    107   CO2 27 26   GLU 88 118*   BUN 15 13   CREATININE 0.4* 0.5   CALCIUM 8.4* 8.3*   PROT 7.9 7.6   ALBUMIN 2.1* 2.0*   BILITOT 1.3* 1.2*   ALKPHOS 149* 143*   AST 66* 59*   ALT 34 33   ANIONGAP 4* 6*     Significant Imaging: I have reviewed all pertinent imaging results/findings within the past 24 hours.

## 2023-01-17 LAB
ALBUMIN SERPL BCP-MCNC: 2.3 G/DL (ref 3.5–5.2)
ALP SERPL-CCNC: 159 U/L (ref 55–135)
ALT SERPL W/O P-5'-P-CCNC: 36 U/L (ref 10–44)
ANION GAP SERPL CALC-SCNC: 7 MMOL/L (ref 8–16)
AST SERPL-CCNC: 59 U/L (ref 10–40)
BASOPHILS # BLD AUTO: 0.03 K/UL (ref 0–0.2)
BASOPHILS NFR BLD: 0.8 % (ref 0–1.9)
BILIRUB SERPL-MCNC: 1.7 MG/DL (ref 0.1–1)
BUN SERPL-MCNC: 14 MG/DL (ref 6–20)
CALCIUM SERPL-MCNC: 8.6 MG/DL (ref 8.7–10.5)
CHLORIDE SERPL-SCNC: 103 MMOL/L (ref 95–110)
CO2 SERPL-SCNC: 27 MMOL/L (ref 23–29)
CREAT SERPL-MCNC: 0.5 MG/DL (ref 0.5–1.4)
DIFFERENTIAL METHOD: ABNORMAL
EOSINOPHIL # BLD AUTO: 0.2 K/UL (ref 0–0.5)
EOSINOPHIL NFR BLD: 4.4 % (ref 0–8)
ERYTHROCYTE [DISTWIDTH] IN BLOOD BY AUTOMATED COUNT: 16.7 % (ref 11.5–14.5)
EST. GFR  (NO RACE VARIABLE): >60 ML/MIN/1.73 M^2
GLUCOSE SERPL-MCNC: 94 MG/DL (ref 70–110)
HCT VFR BLD AUTO: 34.5 % (ref 37–48.5)
HGB BLD-MCNC: 11 G/DL (ref 12–16)
IMM GRANULOCYTES # BLD AUTO: 0.02 K/UL (ref 0–0.04)
IMM GRANULOCYTES NFR BLD AUTO: 0.6 % (ref 0–0.5)
LYMPHOCYTES # BLD AUTO: 0.6 K/UL (ref 1–4.8)
LYMPHOCYTES NFR BLD: 15.8 % (ref 18–48)
MAGNESIUM SERPL-MCNC: 1.8 MG/DL (ref 1.6–2.6)
MCH RBC QN AUTO: 27.4 PG (ref 27–31)
MCHC RBC AUTO-ENTMCNC: 31.9 G/DL (ref 32–36)
MCV RBC AUTO: 86 FL (ref 82–98)
MONOCYTES # BLD AUTO: 0.3 K/UL (ref 0.3–1)
MONOCYTES NFR BLD: 9.2 % (ref 4–15)
NEUTROPHILS # BLD AUTO: 2.5 K/UL (ref 1.8–7.7)
NEUTROPHILS NFR BLD: 69.2 % (ref 38–73)
NRBC BLD-RTO: 0 /100 WBC
PLATELET # BLD AUTO: 152 K/UL (ref 150–450)
PMV BLD AUTO: 9.1 FL (ref 9.2–12.9)
POTASSIUM SERPL-SCNC: 3.7 MMOL/L (ref 3.5–5.1)
PROT SERPL-MCNC: 8.7 G/DL (ref 6–8.4)
RBC # BLD AUTO: 4.02 M/UL (ref 4–5.4)
SODIUM SERPL-SCNC: 137 MMOL/L (ref 136–145)
WBC # BLD AUTO: 3.6 K/UL (ref 3.9–12.7)

## 2023-01-17 PROCEDURE — 25000003 PHARM REV CODE 250: Performed by: STUDENT IN AN ORGANIZED HEALTH CARE EDUCATION/TRAINING PROGRAM

## 2023-01-17 PROCEDURE — 11000001 HC ACUTE MED/SURG PRIVATE ROOM

## 2023-01-17 PROCEDURE — 97530 THERAPEUTIC ACTIVITIES: CPT | Mod: CO

## 2023-01-17 PROCEDURE — 85025 COMPLETE CBC W/AUTO DIFF WBC: CPT | Performed by: INTERNAL MEDICINE

## 2023-01-17 PROCEDURE — 25000003 PHARM REV CODE 250: Performed by: PSYCHIATRY & NEUROLOGY

## 2023-01-17 PROCEDURE — 27000207 HC ISOLATION

## 2023-01-17 PROCEDURE — 99233 PR SUBSEQUENT HOSPITAL CARE,LEVL III: ICD-10-PCS | Mod: ,,, | Performed by: STUDENT IN AN ORGANIZED HEALTH CARE EDUCATION/TRAINING PROGRAM

## 2023-01-17 PROCEDURE — 99900031 HC PATIENT EDUCATION (STAT)

## 2023-01-17 PROCEDURE — 25000003 PHARM REV CODE 250: Performed by: INTERNAL MEDICINE

## 2023-01-17 PROCEDURE — 94761 N-INVAS EAR/PLS OXIMETRY MLT: CPT

## 2023-01-17 PROCEDURE — 36415 COLL VENOUS BLD VENIPUNCTURE: CPT | Performed by: INTERNAL MEDICINE

## 2023-01-17 PROCEDURE — 99900035 HC TECH TIME PER 15 MIN (STAT)

## 2023-01-17 PROCEDURE — 83735 ASSAY OF MAGNESIUM: CPT | Performed by: INTERNAL MEDICINE

## 2023-01-17 PROCEDURE — 99233 SBSQ HOSP IP/OBS HIGH 50: CPT | Mod: ,,, | Performed by: STUDENT IN AN ORGANIZED HEALTH CARE EDUCATION/TRAINING PROGRAM

## 2023-01-17 PROCEDURE — 80053 COMPREHEN METABOLIC PANEL: CPT | Performed by: INTERNAL MEDICINE

## 2023-01-17 RX ORDER — TRAZODONE HYDROCHLORIDE 100 MG/1
100 TABLET ORAL NIGHTLY
Status: DISCONTINUED | OUTPATIENT
Start: 2023-01-17 | End: 2023-01-19 | Stop reason: HOSPADM

## 2023-01-17 RX ORDER — GABAPENTIN 300 MG/1
600 CAPSULE ORAL 3 TIMES DAILY
Status: DISCONTINUED | OUTPATIENT
Start: 2023-01-17 | End: 2023-01-19 | Stop reason: HOSPADM

## 2023-01-17 RX ORDER — LANOLIN ALCOHOL/MO/W.PET/CERES
400 CREAM (GRAM) TOPICAL 2 TIMES DAILY
Status: DISCONTINUED | OUTPATIENT
Start: 2023-01-17 | End: 2023-01-19 | Stop reason: HOSPADM

## 2023-01-17 RX ORDER — LORAZEPAM 1 MG/1
1 TABLET ORAL EVERY 6 HOURS PRN
Status: DISCONTINUED | OUTPATIENT
Start: 2023-01-17 | End: 2023-01-19 | Stop reason: HOSPADM

## 2023-01-17 RX ORDER — POTASSIUM CHLORIDE 20 MEQ/1
20 TABLET, EXTENDED RELEASE ORAL
Status: COMPLETED | OUTPATIENT
Start: 2023-01-17 | End: 2023-01-17

## 2023-01-17 RX ADMIN — LORAZEPAM 1 MG: 1 TABLET ORAL at 04:01

## 2023-01-17 RX ADMIN — PANTOPRAZOLE SODIUM 40 MG: 40 TABLET, DELAYED RELEASE ORAL at 08:01

## 2023-01-17 RX ADMIN — MAGNESIUM OXIDE TAB 400 MG (241.3 MG ELEMENTAL MG) 400 MG: 400 (241.3 MG) TAB at 09:01

## 2023-01-17 RX ADMIN — MAGNESIUM OXIDE TAB 400 MG (241.3 MG ELEMENTAL MG) 400 MG: 400 (241.3 MG) TAB at 08:01

## 2023-01-17 RX ADMIN — AMOXICILLIN 1000 MG: 250 CAPSULE ORAL at 09:01

## 2023-01-17 RX ADMIN — LORAZEPAM 1 MG: 1 TABLET ORAL at 11:01

## 2023-01-17 RX ADMIN — POTASSIUM CHLORIDE 20 MEQ: 1500 TABLET, EXTENDED RELEASE ORAL at 11:01

## 2023-01-17 RX ADMIN — AMOXICILLIN 1000 MG: 250 CAPSULE ORAL at 08:01

## 2023-01-17 RX ADMIN — HYDROCODONE BITARTRATE AND ACETAMINOPHEN 1 TABLET: 10; 325 TABLET ORAL at 04:01

## 2023-01-17 RX ADMIN — TRAZODONE HYDROCHLORIDE 100 MG: 100 TABLET ORAL at 09:01

## 2023-01-17 RX ADMIN — POTASSIUM CHLORIDE 20 MEQ: 1500 TABLET, EXTENDED RELEASE ORAL at 08:01

## 2023-01-17 RX ADMIN — BUPROPION HYDROCHLORIDE 300 MG: 150 TABLET, EXTENDED RELEASE ORAL at 08:01

## 2023-01-17 RX ADMIN — GABAPENTIN 400 MG: 400 CAPSULE ORAL at 08:01

## 2023-01-17 RX ADMIN — GABAPENTIN 600 MG: 300 CAPSULE ORAL at 09:01

## 2023-01-17 RX ADMIN — HYDROCODONE BITARTRATE AND ACETAMINOPHEN 1 TABLET: 10; 325 TABLET ORAL at 08:01

## 2023-01-17 RX ADMIN — GABAPENTIN 600 MG: 300 CAPSULE ORAL at 02:01

## 2023-01-17 RX ADMIN — ALPRAZOLAM 0.25 MG: 0.25 TABLET ORAL at 08:01

## 2023-01-17 RX ADMIN — POTASSIUM CHLORIDE 20 MEQ: 1500 TABLET, EXTENDED RELEASE ORAL at 02:01

## 2023-01-17 NOTE — ASSESSMENT & PLAN NOTE
Lower extremity weakness bilaterally now requiring aggressive OT and PT efforts to prevent further weakness and also regain strength necessary to carry out ADLs.   - From early bed bound state, patient has required multiple nursing staff in safely getting in and out of bed   - Original discharge plans with outpatient rehab will cause more harm to patient at this time and case management working to find a placement at SNF with PT and OT efforts  1/9 continue efforts with PT and OT, awaiting placement  1/10 slow progress with PT and OT, will obtain MRI brain and spine to identify any concerning changes  1/11 MRI limitations to TL spine study secondary to hardware, brain and cervical scan noting no acute findings;   1. No acute intracranial abnormality identified.  2. Nonspecific gliotic white matter signal change within bilateral cerebral hemispheres similar in extent and distribution greater than expected for patient age. This may be related to chronic small vessel ischemia.  Numerous chronic systemic illnesses can result in this appearance. Demyelinating disease is also in the differential.  Multiple prolonged hospitalization, neurosurgical intervention, chronic inflammation, substance abuse all likely contributing to current state. Outpatient follow up with specialists have been interrupted with hospitalizations and inconclusive studies. Will follow up with neurology for evaluation and optimize current therapy.    1/13 Awaiting [facility] diversion to be lifted for neurosurgery transfer  1/14:  Awaiting transfer.  1/15:  Patient has been accepted to [facility] will transfer for neurosurgery evaluation and imaging.  1/16: overnight report, [facility] back on diversion, awaiting transfer  1/17: diversion lift, awaiting available bed for transfer

## 2023-01-17 NOTE — PT/OT/SLP PROGRESS
Physical Therapy      Patient Name:  Rachel Zazueta   MRN:  [id number]    Patient not seen today secondary to Patient unwilling to participate, Pain (Pt reported pain, Pain meds prior to session. 2nd attempt made.). Will follow-up 1/18/23.

## 2023-01-17 NOTE — ASSESSMENT & PLAN NOTE
Labs noted, stable.  AST   Date Value Ref Range Status   01/17/2023 59 (H) 10 - 40 U/L Final   01/16/2023 59 (H) 10 - 40 U/L Final   01/15/2023 66 (H) 10 - 40 U/L Final   01/14/2023 58 (H) 10 - 40 U/L Final   01/13/2023 45 (H) 10 - 40 U/L Final     ALT   Date Value Ref Range Status   01/17/2023 36 10 - 44 U/L Final   01/16/2023 33 10 - 44 U/L Final   01/15/2023 34 10 - 44 U/L Final   01/14/2023 28 10 - 44 U/L Final   01/13/2023 23 10 - 44 U/L Final        Alkaline Phosphatase   Date Value Ref Range Status   01/17/2023 159 (H) 55 - 135 U/L Final   01/16/2023 143 (H) 55 - 135 U/L Final   01/15/2023 149 (H) 55 - 135 U/L Final   01/14/2023 137 (H) 55 - 135 U/L Final   01/13/2023 127 55 - 135 U/L Final

## 2023-01-17 NOTE — ASSESSMENT & PLAN NOTE
Counts are stable at this time.  Two days of leukocyte count dropping 2.39 to 1.76 and 1.42.   Overnight febrile event >101F, patient however feels better compared to yesterday evening.   1/11: Morning, vital signs stable, no tachycardia, BP normotensive.   - f/u blood cultures, NGTD x 1 day  - resumed empiric antibiotics, merrem after two days of elevated temperature >101F and downtrending ANC (900/ul)  1/13 drop in ANC to 900/ul, patient without symptoms, vital signs stable  1/16 stable    Patient has a leukocytosis.  Last trend as follows-   Lab Results   Component Value Date    WBC 3.60 (L) 01/17/2023    WBC 3.01 (L) 01/16/2023    WBC 2.61 (L) 01/15/2023     Recent Labs   Lab 01/15/23  0424 01/16/23  0526 01/17/23  0519   Hemoglobin 9.9 L 9.5 L 11.0 L

## 2023-01-17 NOTE — ASSESSMENT & PLAN NOTE
Patient has recurrent depression which is moderate and is currently controlled. Will Continue anti-depressant medications. We will consult psychiatry at this time. Patient does not display psychosis at this time. Continue to monitor closely and adjust plan of care as needed.  1/12 bupropion increase to 300 mg daily, gabapentin increase to 400 mg TID  1/16 anxiolytic increased to IV ativan 1mg Q6HPRN with continued daily buproprion, gabapentin, tizanidine  1/17 IV ativan PRN, gabapentin increased 600 mg TID, continue buproprion, tizanidine, add trazodone at bedtime

## 2023-01-17 NOTE — PT/OT/SLP PROGRESS
Physical Therapy      Patient Name:  Rachel Zazueta   MRN:  [id number]    Patient not seen today secondary to Patient unwilling to participate (1st attempt 1109). Will follow-up later on today.

## 2023-01-17 NOTE — SUBJECTIVE & OBJECTIVE
"Interval History: Patient seen and examined. Voicing frustration and concern for her health outcome, but also appreciates the current situation. "I am trying really hard moving my legs and they just won't work anymore."    Review of Systems   Constitutional:  Negative for activity change, appetite change, chills, diaphoresis, fatigue and fever.   HENT:  Negative for sore throat.    Eyes:  Negative for pain.   Respiratory:  Negative for cough, choking, chest tightness and shortness of breath.    Cardiovascular:  Negative for chest pain, palpitations and leg swelling.   Gastrointestinal:  Negative for abdominal distention, abdominal pain, constipation, diarrhea, nausea, rectal pain and vomiting.   Genitourinary:  Negative for dyspareunia, dysuria, flank pain, frequency, genital sores, hematuria and menstrual problem.   Musculoskeletal:  Positive for arthralgias, back pain, gait problem, myalgias and neck pain. Negative for neck stiffness.   Skin:  Negative for pallor, rash and wound.   Neurological:  Positive for weakness. Negative for dizziness, tremors, speech difficulty and headaches.   Psychiatric/Behavioral:  Positive for dysphoric mood and sleep disturbance. Negative for agitation, behavioral problems, confusion, decreased concentration, self-injury and suicidal ideas. The patient is nervous/anxious.    Objective:     Vital Signs (Most Recent):  Temp: 97.9 °F (36.6 °C) (01/17/23 1132)  Pulse: (!) 122 (01/17/23 1132)  Resp: 20 (01/17/23 1132)  BP: 133/80 (01/17/23 1132)  SpO2: 100 % (01/17/23 1132)   Vital Signs (24h Range):  Temp:  [97.7 °F (36.5 °C)-98.7 °F (37.1 °C)] 97.9 °F (36.6 °C)  Pulse:  [107-123] 122  Resp:  [20-24] 20  SpO2:  [93 %-100 %] 100 %  BP: ()/(53-80) 133/80     Weight: 110.6 kg (243 lb 12.8 oz)  Body mass index is 38.18 kg/m².    Intake/Output Summary (Last 24 hours) at 1/17/2023 1344  Last data filed at 1/17/2023 0651  Gross per 24 hour   Intake 2000 ml   Output 900 ml   Net 1100 ml    "   Physical Exam  Vitals and nursing note reviewed.   Constitutional:       General: She is not in acute distress.     Appearance: She is obese.   HENT:      Head: Normocephalic and atraumatic.      Right Ear: External ear normal.      Left Ear: External ear normal.      Nose: Nose normal. No congestion or rhinorrhea.      Mouth/Throat:      Mouth: Mucous membranes are dry.      Pharynx: No oropharyngeal exudate.   Eyes:      General: No scleral icterus.     Extraocular Movements: Extraocular movements intact.      Conjunctiva/sclera: Conjunctivae normal.   Neck:      Vascular: No carotid bruit.      Comments: Limited ROM at baseline  Cardiovascular:      Rate and Rhythm: Normal rate and regular rhythm.      Heart sounds: Murmur heard.     No friction rub. No gallop.   Pulmonary:      Effort: No respiratory distress.      Breath sounds: No stridor. No wheezing, rhonchi or rales.   Chest:      Chest wall: No tenderness.   Abdominal:      General: There is no distension.      Palpations: Abdomen is soft. There is no mass.      Tenderness: There is no abdominal tenderness. There is no right CVA tenderness, left CVA tenderness or guarding.   Musculoskeletal:         General: No swelling, tenderness or deformity.      Cervical back: Normal range of motion and neck supple. No rigidity or tenderness.      Right lower leg: No edema.      Left lower leg: No edema.   Lymphadenopathy:      Cervical: No cervical adenopathy.   Skin:     General: Skin is dry.      Capillary Refill: Capillary refill takes less than 2 seconds.      Coloration: Skin is not jaundiced or pale.      Findings: No bruising or rash.   Neurological:      Mental Status: She is oriented to person, place, and time. Mental status is at baseline.      Cranial Nerves: No cranial nerve deficit.      Sensory: Sensory deficit present.      Motor: Weakness (unable to lift legs against gravity, also unable to plant feet with hips and knees flexed bilaterally in  supine, weakness more prominent on left side, no SI joint tenderness) present.      Comments: Sensation diminished in bilateral legs   Positive babinski bilaterally.    Psychiatric:         Mood and Affect: Mood normal.         Behavior: Behavior normal.         Thought Content: Thought content normal.      Comments: Cooperative, but increasingly frustrated with fear of inability to walk      Significant Labs: All pertinent labs within the past 24 hours have been reviewed.  BMP:   Recent Labs   Lab 01/17/23 0519   GLU 94      K 3.7      CO2 27   BUN 14   CREATININE 0.5   CALCIUM 8.6*   MG 1.8     CBC:   Recent Labs   Lab 01/16/23 0526 01/17/23 0519   WBC 3.01* 3.60*   HGB 9.5* 11.0*   HCT 29.5* 34.5*   * 152     CMP:   Recent Labs   Lab 01/16/23 0526 01/17/23 0519    137   K 3.9 3.7    103   CO2 26 27   * 94   BUN 13 14   CREATININE 0.5 0.5   CALCIUM 8.3* 8.6*   PROT 7.6 8.7*   ALBUMIN 2.0* 2.3*   BILITOT 1.2* 1.7*   ALKPHOS 143* 159*   AST 59* 59*   ALT 33 36   ANIONGAP 6* 7*     Magnesium:   Recent Labs   Lab 01/16/23 0526 01/17/23 0519   MG 1.8 1.8     Significant Imaging: I have reviewed all pertinent imaging results/findings within the past 24 hours.

## 2023-01-17 NOTE — ASSESSMENT & PLAN NOTE
Check xray of back due to pain, increase in scoliosis.   1/10 given proximal muscle weakness with slow improvement with daily PT and OT, will follow up MRI brain and spine  Limitations with facility's imagining capabilities, cannot rule out cord compression over thoracic and lumbar spine.   [facility] neurosurgery has accepted return of patient to their service for further evaluation. Currently on hospital wide diversion.     1/15: [facility] does have a bed available will transfer for higher level of care at this time.    MRI brain and c spine below.  Cannot r/u cord compression over thoracic and lumbar spine.  Continue with plan for transfer.     MRI BRAIN WITHOUT CONTRAST     CLINICAL HISTORY:  Debility, generalized weakness and pain     COMPARISON:  MRI brain 02/14/2022     TECHNIQUE:  Multiplanar multisequence MR imaging performed of the brain without contrast     FINDINGS:  No detected diffusion signal changes to suggest recent ischemia.  No ventricular or basal cistern effacement.  No midline shift or mass effect.  Major intracranial flow voids appear intact.  Paranasal sinuses and mastoid air cells are clear.  Calvarial signal is intact.  No signal change to suggest recent or remote hemorrhage.  Prominent gliotic nonspecific white matter signal change in the subcortical and periventricular regions of bilateral cerebral hemispheres unchanged in extent and distribution compared to the prior study.  Corpus callosum, pituitary gland, and remaining midline structures are unremarkable.     Impression:     1. No acute intracranial abnormality identified.  2. Nonspecific gliotic white matter signal change within bilateral cerebral hemispheres similar in extent and distribution greater than expected for patient age.  This may be related to chronic small vessel ischemia.  Numerous chronic systemic illnesses can result in this appearance.  Demyelinating disease is also in the differential.        Electronically  signed by: Deion Patel MD  Date:                                            01/10/2023  Time:                                           14:55    Narrative & Impression  EXAMINATION:  MRI CERVICAL SPINE WITHOUT CONTRAST     CLINICAL HISTORY:  Generalized weakness     COMPARISON:  No priors available     TECHNIQUE:  Multiplanar multisequence MR imaging performed of the cervical spine without contrast     FINDINGS:  Imaged portions of the spinal cord demonstrate normal size and signal.  Vertebral bodies are normal in height and alignment.  Artifact present on portions of this exam.  Degenerative related endplate signal changes at C4 through C7.  Marginal osteophytes are present at these levels.     C2-C3: Disc normal in height without herniation.  No spinal canal or foraminal narrowing detected.     C3-C4: Slight loss of disc space height without significant herniation.  No spinal canal or left foraminal narrowing.  Mild right foraminal narrowing related to uncovertebral osteophyte.     C4-C5: Loss of disc space height with broad-based disc osteophyte.  Disc osteophyte approximates the anterior aspect of the cord right of midline.  Artifact limits evaluation.  The may be trace indentation of the cord.  AP diameter of the thecal sac measures 6 mm in this region.  Neural foramen are not well evaluated secondary to artifact.     C5-C6: Loss of disc space height with broad-based disc osteophyte resulting in mild indentation on the anterior thecal sac approximating the anterior cord without definite cord flattening.  AP diameter of the thecal sac measures just over 8 mm.  Neural foramen are not well evaluated secondary to artifact.  Prominent uncovertebral osteophytes do however appear to be present.     C6-C7: Loss of disc space height with mild broad-based disc osteophyte resulting in mild indentation on the anterior thecal sac, but no cord flattening.  AP diameter of the thecal sac measures 8.5 mm.  Artifact limits  evaluation of the neural foramen, but prominent bilateral uncovertebral osteophytes appear to be present.     C7-T1: Maintained disc space height with minimal protrusion at the left lateral recess resulting in no significant spinal canal narrowing.  Limited evaluation of the neural foramen secondary to artifact.     Impression:     Suboptimal study secondary to artifact on portions of this exam.  Multilevel spondylosis is present extending from C3-C7 with individual levels detailed above.        Electronically signed by: Deion Patel MD  Date:                                            01/10/2023  Time:                                           15:01

## 2023-01-17 NOTE — PROGRESS NOTES
"[facility] Medicine  Progress Note    Patient Name: Rachel Zazueta  MRN: [id number]  Patient Class: IP- Inpatient   Admission Date: 12/23/2022  Length of Stay: 25 days  Attending Physician: Dannielle Merino DO  Primary Care Provider: Dannielle Merino DO        Subjective:     Principal Problem:Debility        HPI:  ER HPI:  42-year-old female with a history of anemia, anxiety, recurrent cellulitis, hepatitis C, IVDU, liver cirrhosis, cholecystectomy, kidney stones, lumbar fusion, shoulder surgery, chronic back pain comes in c/o generalized weakness, fatigue, and bilateral flank pain.  She reports that this has been going on intermittently for several days and was diagnosed with a UTI recently.  No fever.  No abdominal pain or vomiting or diarrhea.     IM HPI:  Patient is well known to our service.  Patient has a history of IV drug abuse and unfortunately has relapsed.  Patient has a history of a epidural abscess requiring a large lumbar surgery with multiple rehabilitative stays and therapy.  The patient was progressing to ambulation and recently has declined.  Patient admits to starting drugs including IV drug use again.  Patient presented to the ED with the above symptoms and she has a noticeable cardiac murmur today that we are getting a stat echocardiogram.  Patient's been started on antibiotics fluid resuscitated and seems to have sepsis.  Patient's urinary studies were abnormal but not overwhelming.      Overview/Hospital Course:  12/24/22 CG: Patient remains in the ICU today. Has a lot of muscle spasms and low back pain. Echocardiogram ordered yesterday and completed; significant for pulm HTN but without vegetations.   12/25/22 CG:  Overnight she was having a lot of significant discomfort and pain.  We placed her on Precedex and this has helped significantly with her body aches, her irritability and muscle spasms."  12/26/22 FM:  Patient required Precedex for agitation and mood " changes.  Patient is calm and cooperative this morning.  Patient appears to have E coli sepsis so will taper antibiotics.  Patient's echocardiogram showed no vegetation and E coli would be low risk for metastatic infection.  Will transfer the patient to the floor once her Precedex discontinue we counseled her again today on the importance of avoiding substances and illicit drugs.  This continues to be setback for her.  12/27/22 WC:  Patient overall remains stable and is slowly improving.  Urine culture has revealed Candida albicans in addition to blood culture revealing E coli.  12/28/22 WC:  Patient resting comfortably this morning.  Pharm ID on case for treatment duration recs.    12/29/22 WC:  no acute events overnight.  Back pain at this time.   12/20/22:  no acute events continue IV ABX for esbl  12/31/22 CG: stable, continuing antibiotics.   1/1/2 CG: stable, continuing antibiotics. Has not had a bowel movement in two weeks. Will give lactulose.    01/02/2023: Patient did have a bowel movement yesterday.  Completing last day of antimicrobial therapy anticipated discharge tomorrow.  1/3/2023: Improving BM with lactulose. Lower extremity weakness requiring aggressive PT and OT efforts. Patient understands she needs to be an active participant. Encouraged inbed physical exercises without assistance with therapists. Stable Hg/HCT.   1/4/2023: Lactulose every other day for regular bowel movement. PT and OT recommendations with continue efforts to help with lower extremity weakness which likely precipitated from minimal participation from patient over the past week to remain physically active. Hg/HCT remains stable. Last blood Cx x2 (12/28) NGTD x5 days. Patient agrees moving forward lower extremity strengthening will have to take place with her participation and motivation. Will discharge with continue PT and OT efforts outpatient. She acknowledges outpatient follow up with GI/hepatologist, hematologist and  neurologist required to address all other chronic medication conditions.   1/5/2023: Overnight report from nursing that patient exhibited weakness in lower extremities and in upper body strength to support self to safely transfer from bed to commode or even bed to wheelchair. CM involved in placement to SNF. Continue with PT and OT. All other medical condition addressed during hospitalization remains stable.  1/6/2023: CM working on SNF placement. Patient expresses continue effort on her part to regain her strength, reassured her that she has been able to participate following the leads of PT and OT and so a new injury is less likely at this time. While small, she endorsed sitting up at bedside with legs dangle.    1/7/23 ND patient feeling well this morning no complaints will continue therapy services and rehab placement  1/8/23 ND pt reports some back pain will repeat x-rays to make sure everything looks okay from previous surgery.  Otherwise she is doing well  1/9/23 Repeat xray showing increased kyphosis over TL junction. Today, pain associated with massaging feet bilaterally with no worsening of numbness and tingling. No reported concerns with PT and OT efforts. Awaiting placement.  1/10/23 Overnight elevated temperature >101F, no further discomfort at this time and on morning exam denies further symptoms and worsening pain over joints and spine. PT and OT efforts moving forward, but still has significant muscle weakness proximally and over her core. White cells reduced, CRP elevated. Will obtain MRI scan brain and spine to follow up with worsening scoliosis, identify occult findings.    1/11/23 Second evening with elevated temp >101F with chills and tachycardia. Sepsis precautions taken with labs and cultures. No obvious infectious source, but empirically started merrem for neutropenic fever precautions. Patient this morning endorses good appetite and hopeful in being able to sit up longer today. Yesterday,  she was able to stay seated 10 minutes from 3 minutes, the day prior. Pancytopenia profile back to patient's baseline. Overall pain stable and no reported worsening. Lower extremity weakness with loss of flexion at knees and hips against gravity still prominent. Will appreciate neurology consult. Continue PT and OT.   1/12/23 Neurology consult for paraparesis, differential diagnoses include demyelinating disease, ischemic changes (MRI white matter changes), spinal cord lesions (pt with history of past epidural abscesses), compressive myelopathy.   - MRI brain with white matter changes that are concerning for demyelinating disease vs ischemic change  -Thoracic spine radiographs, three views, demonstrate multilevel thoracolumbar fusion with local kyphosis at T9-10, increased when compared to scoliosis series of 04/14/2022, measuring approximately 40 degree on today's exam and 30° on prior exam  Current facility with CT imagining limitations to further determine nature of spinal compression likely affecting bilateral lower extremity, hyperreflexia and reported altered sensation to feet. Pulses intact bilaterally.   Currently awaiting diversion at main [place] to be lifted for transfer to neurosurgery service with further neurology work up.   No fever overnight. Blood cultures NGTD x 2 days. Will continue merrem for another 24 hours, then discontinue if no elevated temperature reading.   1/13/23 No fever overnight, WBC dropped again. NGTD x3 days on all blood cultures collected after febrile episodes x2, will discontinue merrem and resume suppressive amoxicillin Q12H secondary to partial hardware retention and hx of GBS. Early PM started to complain of  muscle spasms over her entire back that would release with breathing. Tizanidine started and pain control improved. Awaiting transfer to neurosurgery service for advanced imagining and neurology consult.   1/14/23 WC: patient afebrile overnight.  Blood cultures remain  "negative.  Tolerating transition to suppressive abx therapy with amoxicillin secondary to partial hardware retention.  Endorses ongoing weakness to bilateral lower ext.  Positive babinski bilaterally.  Transfer for advanced imaging and neuro consult.   01/15/2023:  A bed has become available at [facility] main campus.  Patient will be transferred for high-level care to include Hospital Medicine, Neurosurgery and any other potential sources she may need.  We appreciate their assistance with transfer.  1/16/23: No transfer overnight. Patient increasingly experiencing pain focal tenderness over thoracic spine. Patient increasingly agitated and anxious, tearful. Will f/u neurology and psychiatry. Awaiting transfer to care of neurosurgery at [facility].   1/17/23: No transfer overnight. Pain control with increased hydrocodone, gabapentin and adding trazodone at bedtime. Will discontinue xanax PRN to ativan PRN for increasing anxiety. Diversion has been lifted today, awaiting discharges for transfer to move forward.       Interval History: Patient seen and examined. Voicing frustration and concern for her health outcome, but also appreciates the current situation. "I am trying really hard moving my legs and they just won't work anymore."    Review of Systems   Constitutional:  Negative for activity change, appetite change, chills, diaphoresis, fatigue and fever.   HENT:  Negative for sore throat.    Eyes:  Negative for pain.   Respiratory:  Negative for cough, choking, chest tightness and shortness of breath.    Cardiovascular:  Negative for chest pain, palpitations and leg swelling.   Gastrointestinal:  Negative for abdominal distention, abdominal pain, constipation, diarrhea, nausea, rectal pain and vomiting.   Genitourinary:  Negative for dyspareunia, dysuria, flank pain, frequency, genital sores, hematuria and menstrual problem.   Musculoskeletal:  Positive for arthralgias, back pain, gait problem, myalgias and neck " pain. Negative for neck stiffness.   Skin:  Negative for pallor, rash and wound.   Neurological:  Positive for weakness. Negative for dizziness, tremors, speech difficulty and headaches.   Psychiatric/Behavioral:  Positive for dysphoric mood and sleep disturbance. Negative for agitation, behavioral problems, confusion, decreased concentration, self-injury and suicidal ideas. The patient is nervous/anxious.    Objective:     Vital Signs (Most Recent):  Temp: 97.9 °F (36.6 °C) (01/17/23 1132)  Pulse: (!) 122 (01/17/23 1132)  Resp: 20 (01/17/23 1132)  BP: 133/80 (01/17/23 1132)  SpO2: 100 % (01/17/23 1132)   Vital Signs (24h Range):  Temp:  [97.7 °F (36.5 °C)-98.7 °F (37.1 °C)] 97.9 °F (36.6 °C)  Pulse:  [107-123] 122  Resp:  [20-24] 20  SpO2:  [93 %-100 %] 100 %  BP: ()/(53-80) 133/80     Weight: 110.6 kg (243 lb 12.8 oz)  Body mass index is 38.18 kg/m².    Intake/Output Summary (Last 24 hours) at 1/17/2023 1344  Last data filed at 1/17/2023 0651  Gross per 24 hour   Intake 2000 ml   Output 900 ml   Net 1100 ml      Physical Exam  Vitals and nursing note reviewed.   Constitutional:       General: She is not in acute distress.     Appearance: She is obese.   HENT:      Head: Normocephalic and atraumatic.      Right Ear: External ear normal.      Left Ear: External ear normal.      Nose: Nose normal. No congestion or rhinorrhea.      Mouth/Throat:      Mouth: Mucous membranes are dry.      Pharynx: No oropharyngeal exudate.   Eyes:      General: No scleral icterus.     Extraocular Movements: Extraocular movements intact.      Conjunctiva/sclera: Conjunctivae normal.   Neck:      Vascular: No carotid bruit.      Comments: Limited ROM at baseline  Cardiovascular:      Rate and Rhythm: Normal rate and regular rhythm.      Heart sounds: Murmur heard.     No friction rub. No gallop.   Pulmonary:      Effort: No respiratory distress.      Breath sounds: No stridor. No wheezing, rhonchi or rales.   Chest:      Chest  wall: No tenderness.   Abdominal:      General: There is no distension.      Palpations: Abdomen is soft. There is no mass.      Tenderness: There is no abdominal tenderness. There is no right CVA tenderness, left CVA tenderness or guarding.   Musculoskeletal:         General: No swelling, tenderness or deformity.      Cervical back: Normal range of motion and neck supple. No rigidity or tenderness.      Right lower leg: No edema.      Left lower leg: No edema.   Lymphadenopathy:      Cervical: No cervical adenopathy.   Skin:     General: Skin is dry.      Capillary Refill: Capillary refill takes less than 2 seconds.      Coloration: Skin is not jaundiced or pale.      Findings: No bruising or rash.   Neurological:      Mental Status: She is oriented to person, place, and time. Mental status is at baseline.      Cranial Nerves: No cranial nerve deficit.      Sensory: Sensory deficit present.      Motor: Weakness (unable to lift legs against gravity, also unable to plant feet with hips and knees flexed bilaterally in supine, weakness more prominent on left side, no SI joint tenderness) present.      Comments: Sensation diminished in bilateral legs   Positive babinski bilaterally.    Psychiatric:         Mood and Affect: Mood normal.         Behavior: Behavior normal.         Thought Content: Thought content normal.      Comments: Cooperative, but increasingly frustrated with fear of inability to walk      Significant Labs: All pertinent labs within the past 24 hours have been reviewed.  BMP:   Recent Labs   Lab 01/17/23  0519   GLU 94      K 3.7      CO2 27   BUN 14   CREATININE 0.5   CALCIUM 8.6*   MG 1.8     CBC:   Recent Labs   Lab 01/16/23 0526 01/17/23 0519   WBC 3.01* 3.60*   HGB 9.5* 11.0*   HCT 29.5* 34.5*   * 152     CMP:   Recent Labs   Lab 01/16/23 0526 01/17/23 0519    137   K 3.9 3.7    103   CO2 26 27   * 94   BUN 13 14   CREATININE 0.5 0.5   CALCIUM 8.3* 8.6*    PROT 7.6 8.7*   ALBUMIN 2.0* 2.3*   BILITOT 1.2* 1.7*   ALKPHOS 143* 159*   AST 59* 59*   ALT 33 36   ANIONGAP 6* 7*     Magnesium:   Recent Labs   Lab 01/16/23  0526 01/17/23  0519   MG 1.8 1.8     Significant Imaging: I have reviewed all pertinent imaging results/findings within the past 24 hours.      Assessment/Plan:      * Debility  Lower extremity weakness bilaterally now requiring aggressive OT and PT efforts to prevent further weakness and also regain strength necessary to carry out ADLs.   - From early bed bound state, patient has required multiple nursing staff in safely getting in and out of bed   - Original discharge plans with outpatient rehab will cause more harm to patient at this time and case management working to find a placement at SNF with PT and OT efforts  1/9 continue efforts with PT and OT, awaiting placement  1/10 slow progress with PT and OT, will obtain MRI brain and spine to identify any concerning changes  1/11 MRI limitations to TL spine study secondary to hardware, brain and cervical scan noting no acute findings;   1. No acute intracranial abnormality identified.  2. Nonspecific gliotic white matter signal change within bilateral cerebral hemispheres similar in extent and distribution greater than expected for patient age. This may be related to chronic small vessel ischemia.  Numerous chronic systemic illnesses can result in this appearance. Demyelinating disease is also in the differential.  Multiple prolonged hospitalization, neurosurgical intervention, chronic inflammation, substance abuse all likely contributing to current state. Outpatient follow up with specialists have been interrupted with hospitalizations and inconclusive studies. Will follow up with neurology for evaluation and optimize current therapy.    1/13 Awaiting [facility] diversion to be lifted for neurosurgery transfer  1/14:  Awaiting transfer.  1/15:  Patient has been accepted to [facility] will transfer for  neurosurgery evaluation and imaging.  1/16: overnight report, [facility] back on diversion, awaiting transfer  1/17: diversion lift, awaiting available bed for transfer    Anxiety and depression  Patient has recurrent depression which is moderate and is currently controlled. Will Continue anti-depressant medications. We will consult psychiatry at this time. Patient does not display psychosis at this time. Continue to monitor closely and adjust plan of care as needed.  1/12 bupropion increase to 300 mg daily, gabapentin increase to 400 mg TID  1/16 anxiolytic increased to IV ativan 1mg Q6HPRN with continued daily buproprion, gabapentin, tizanidine  1/17 IV ativan PRN, gabapentin increased 600 mg TID, continue buproprion, tizanidine, add trazodone at bedtime            Pulmonary hypertension  Found incidentally on echocardiogram with evidence of elevated right sided pressures and dilated heart chambers on the right side. Unclear the exact etiology or class of Pulm HTN but given her significant substance abuse Class 1 certainly is a possibility.   - Will refer to outpatient pulmonologist  - Will consider a cariology consult while inpatient to see if they think it would be beneficial to have a heart cath done while inpatient      Severe sepsis  This patient does have evidence of infective focus  My overall impression is sepsis. Vital signs were reviewed and noted in progress note.  Antibiotics given-   Antibiotics (From admission, onward)    Start     Stop Route Frequency Ordered    01/14/23 0900  amoxicillin capsule 1,000 mg         -- Oral Every 12 hours 01/13/23 1947    01/02/23 1515  meropenem (MERREM) 1 g in sodium chloride 0.9 % 100 mL IVPB (MB+)        See Hyperspace for full Linked Orders Report.    01/03 0714 IV Every 8 hours (non-standard times) 01/02/23 0923    12/23/22 0533  vancomycin (VANCOCIN) 1,000 mg injection        Note to Pharmacy: Created by cabinet override    12/23 3429   12/23/22 0568     12/23/22 0405  piperacillin-tazobactam (ZOSYN) 4.5 gram injection        Note to Pharmacy: Created by cabinet override    12/23 1614   12/23/22 0405        Cultures were taken-   Microbiology Results (last 7 days)     Procedure Component Value Units Date/Time    Blood culture [[id number]] Collected: 01/10/23 2348    Order Status: Completed Specimen: Blood Updated: 01/16/23 0612     Blood Culture, Routine No growth after 5 days.    Blood culture [[id number]] Collected: 01/10/23 2344    Order Status: Completed Specimen: Blood Updated: 01/16/23 0612     Blood Culture, Routine No growth after 5 days.    Blood culture [[id number]] Collected: 01/10/23 0616    Order Status: Completed Specimen: Blood Updated: 01/15/23 2012     Blood Culture, Routine No growth after 5 days.    Blood culture [[id number]] Collected: 01/10/23 0621    Order Status: Completed Specimen: Blood Updated: 01/15/23 2012     Blood Culture, Routine No growth after 5 days.    Culture, Respiratory with Gram Stain [[id number]]     Order Status: No result Specimen: Respiratory         Latest lactate reviewed, they are-  No results for input(s): LACTATE in the last 72 hours.    Organ dysfunction indicated by Acute kidney injury, Encephalopathy  and Acute liver injury  Source- ?    Source control Achieved by- see orders  - Merrem day 7 of 7 (completed on 1/3/23)  - Blood cx x2 (1/2/23) final report no growth    1/7 resolved  1/10 overnight elevated temperature reading, no worsening overall pain. Follow up Blood cultures and imagining.   1/11 second evening with elevated temperature >101F with symptoms, started Merrem  1/13 48 hours of defervescence, NGTD x 3 days on blood cultures (1/10 and 1/11), will discontinue Merrem. Per discussion with Pharm ID, will resume suppressive therapy with amoxicillin 1g BID starting 1/14 1/16 Blood cultures remain negative. Continue suppressive therapy.      Murmur, cardiac  No evidence of vegetations.     Mild episode of  recurrent major depressive disorder  Resume home meds.   - lyrica discontinued  1/12 gabapentin increased to 400 mg TID  1/16 increase PRN anxiolytic      Cannabis use disorder, moderate, dependence  As above.      Amphetamine use disorder, severe  Continue to  educate and support.      Spinal stenosis at L4-L5 level  Check xray of back due to pain, increase in scoliosis.   1/10 given proximal muscle weakness with slow improvement with daily PT and OT, will follow up MRI brain and spine  Limitations with facility's imagining capabilities, cannot rule out cord compression over thoracic and lumbar spine.   [facility] neurosurgery has accepted return of patient to their service for further evaluation. Currently on hospital wide diversion.     1/15: [facility] does have a bed available will transfer for higher level of care at this time.    MRI brain and c spine below.  Cannot r/u cord compression over thoracic and lumbar spine.  Continue with plan for transfer.     MRI BRAIN WITHOUT CONTRAST     CLINICAL HISTORY:  Debility, generalized weakness and pain     COMPARISON:  MRI brain 02/14/2022     TECHNIQUE:  Multiplanar multisequence MR imaging performed of the brain without contrast     FINDINGS:  No detected diffusion signal changes to suggest recent ischemia.  No ventricular or basal cistern effacement.  No midline shift or mass effect.  Major intracranial flow voids appear intact.  Paranasal sinuses and mastoid air cells are clear.  Calvarial signal is intact.  No signal change to suggest recent or remote hemorrhage.  Prominent gliotic nonspecific white matter signal change in the subcortical and periventricular regions of bilateral cerebral hemispheres unchanged in extent and distribution compared to the prior study.  Corpus callosum, pituitary gland, and remaining midline structures are unremarkable.     Impression:     1. No acute intracranial abnormality identified.  2. Nonspecific gliotic white matter  signal change within bilateral cerebral hemispheres similar in extent and distribution greater than expected for patient age.  This may be related to chronic small vessel ischemia.  Numerous chronic systemic illnesses can result in this appearance.  Demyelinating disease is also in the differential.        Electronically signed by: Deion Patel MD  Date:                                            01/10/2023  Time:                                           14:55    Narrative & Impression  EXAMINATION:  MRI CERVICAL SPINE WITHOUT CONTRAST     CLINICAL HISTORY:  Generalized weakness     COMPARISON:  No priors available     TECHNIQUE:  Multiplanar multisequence MR imaging performed of the cervical spine without contrast     FINDINGS:  Imaged portions of the spinal cord demonstrate normal size and signal.  Vertebral bodies are normal in height and alignment.  Artifact present on portions of this exam.  Degenerative related endplate signal changes at C4 through C7.  Marginal osteophytes are present at these levels.     C2-C3: Disc normal in height without herniation.  No spinal canal or foraminal narrowing detected.     C3-C4: Slight loss of disc space height without significant herniation.  No spinal canal or left foraminal narrowing.  Mild right foraminal narrowing related to uncovertebral osteophyte.     C4-C5: Loss of disc space height with broad-based disc osteophyte.  Disc osteophyte approximates the anterior aspect of the cord right of midline.  Artifact limits evaluation.  The may be trace indentation of the cord.  AP diameter of the thecal sac measures 6 mm in this region.  Neural foramen are not well evaluated secondary to artifact.     C5-C6: Loss of disc space height with broad-based disc osteophyte resulting in mild indentation on the anterior thecal sac approximating the anterior cord without definite cord flattening.  AP diameter of the thecal sac measures just over 8 mm.  Neural foramen are not well  evaluated secondary to artifact.  Prominent uncovertebral osteophytes do however appear to be present.     C6-C7: Loss of disc space height with mild broad-based disc osteophyte resulting in mild indentation on the anterior thecal sac, but no cord flattening.  AP diameter of the thecal sac measures 8.5 mm.  Artifact limits evaluation of the neural foramen, but prominent bilateral uncovertebral osteophytes appear to be present.     C7-T1: Maintained disc space height with minimal protrusion at the left lateral recess resulting in no significant spinal canal narrowing.  Limited evaluation of the neural foramen secondary to artifact.     Impression:     Suboptimal study secondary to artifact on portions of this exam.  Multilevel spondylosis is present extending from C3-C7 with individual levels detailed above.        Electronically signed by: Deion Patel MD  Date:                                            01/10/2023  Time:                                           15:01    Substance use disorder  Cessation advised. Follow up consult psychiatry.     Chronic hepatitis C with cirrhosis  Labs noted, will require follow up outpatient with GI to start treatment.       Cirrhosis of liver without ascites  Labs noted, stable.  AST   Date Value Ref Range Status   01/17/2023 59 (H) 10 - 40 U/L Final   01/16/2023 59 (H) 10 - 40 U/L Final   01/15/2023 66 (H) 10 - 40 U/L Final   01/14/2023 58 (H) 10 - 40 U/L Final   01/13/2023 45 (H) 10 - 40 U/L Final     ALT   Date Value Ref Range Status   01/17/2023 36 10 - 44 U/L Final   01/16/2023 33 10 - 44 U/L Final   01/15/2023 34 10 - 44 U/L Final   01/14/2023 28 10 - 44 U/L Final   01/13/2023 23 10 - 44 U/L Final        Alkaline Phosphatase   Date Value Ref Range Status   01/17/2023 159 (H) 55 - 135 U/L Final   01/16/2023 143 (H) 55 - 135 U/L Final   01/15/2023 149 (H) 55 - 135 U/L Final   01/14/2023 137 (H) 55 - 135 U/L Final   01/13/2023 127 55 - 135 U/L Final             Pancytopenia  Counts are stable at this time.  Two days of leukocyte count dropping 2.39 to 1.76 and 1.42.   Overnight febrile event >101F, patient however feels better compared to yesterday evening.   1/11: Morning, vital signs stable, no tachycardia, BP normotensive.   - f/u blood cultures, NGTD x 1 day  - resumed empiric antibiotics, merrem after two days of elevated temperature >101F and downtrending ANC (900/ul)  1/13 drop in ANC to 900/ul, patient without symptoms, vital signs stable  1/16 stable    Patient has a leukocytosis.  Last trend as follows-   Lab Results   Component Value Date    WBC 3.60 (L) 01/17/2023    WBC 3.01 (L) 01/16/2023    WBC 2.61 (L) 01/15/2023     Recent Labs   Lab 01/15/23  0424 01/16/23  0526 01/17/23  0519   Hemoglobin 9.9 L 9.5 L 11.0 L         VTE Risk Mitigation (From admission, onward)         Ordered     IP VTE HIGH RISK PATIENT  Once         12/23/22 0647     Place sequential compression device  Until discontinued         12/23/22 0647                Discharge Planning   SHIRA: 1/15/2023     Code Status: Full Code   Is the patient medically ready for discharge?:     Reason for patient still in hospital (select all that apply): Patient trending condition, Consult recommendations and Pending disposition  Discharge Plan A: Skilled Nursing Facility   Discharge Delays: None known at this time              Dannielle Merino DO  Department of Hospital Medicine   [facility]

## 2023-01-17 NOTE — ASSESSMENT & PLAN NOTE
This patient does have evidence of infective focus  My overall impression is sepsis. Vital signs were reviewed and noted in progress note.  Antibiotics given-   Antibiotics (From admission, onward)    Start     Stop Route Frequency Ordered    01/14/23 0900  amoxicillin capsule 1,000 mg         -- Oral Every 12 hours 01/13/23 1947 01/02/23 1515  meropenem (MERREM) 1 g in sodium chloride 0.9 % 100 mL IVPB (MB+)        [facility]    01/03 0714 IV Every 8 hours (non-standard times) 01/02/23 0923    12/23/22 0533  vancomycin (VANCOCIN) 1,000 mg injection        Note to Pharmacy: Created by cabinet override    12/23 1744   12/23/22 0533    12/23/22 0405  piperacillin-tazobactam (ZOSYN) 4.5 gram injection        Note to Pharmacy: Created by cabinet override    12/23 1614   12/23/22 0405        Cultures were taken-   Microbiology Results (last 7 days)     Procedure Component Value Units Date/Time    Blood culture [[id number]] Collected: 01/10/23 2348    Order Status: Completed Specimen: Blood Updated: 01/16/23 0612     Blood Culture, Routine No growth after 5 days.    Blood culture [[id number]] Collected: 01/10/23 2344    Order Status: Completed Specimen: Blood Updated: 01/16/23 0612     Blood Culture, Routine No growth after 5 days.    Blood culture [[id number]] Collected: 01/10/23 0616    Order Status: Completed Specimen: Blood Updated: 01/15/23 2012     Blood Culture, Routine No growth after 5 days.    Blood culture [[id number]] Collected: 01/10/23 0621    Order Status: Completed Specimen: Blood Updated: 01/15/23 2012     Blood Culture, Routine No growth after 5 days.    Culture, Respiratory with Gram Stain [[id number]]     Order Status: No result Specimen: Respiratory         Latest lactate reviewed, they are-  No results for input(s): LACTATE in the last 72 hours.    Organ dysfunction indicated by Acute kidney injury, Encephalopathy  and Acute liver injury  Source- ?    Source control  Achieved by- see orders  - Merrem day 7 of 7 (completed on 1/3/23)  - Blood cx x2 (1/2/23) final report no growth    1/7 resolved  1/10 overnight elevated temperature reading, no worsening overall pain. Follow up Blood cultures and imagining.   1/11 second evening with elevated temperature >101F with symptoms, started Merrem  1/13 48 hours of defervescence, NGTD x 3 days on blood cultures (1/10 and 1/11), will discontinue Merrem. Per discussion with Pharm ID, will resume suppressive therapy with amoxicillin 1g BID starting 1/14 1/16 Blood cultures remain negative. Continue suppressive therapy.

## 2023-01-17 NOTE — PT/OT/SLP PROGRESS
Occupational Therapy   Treatment    Name: Rachel Zazueta  MRN: [id number]  Admitting Diagnosis:  Debility       Recommendations:     Discharge Recommendations: acute care hospital  Discharge Equipment Recommendations:  wheelchair  Barriers to discharge:   (Further medical care required)    Assessment:     Rachel Zazueta is a 42 y.o. female with a medical diagnosis of Debility. Performance deficits affecting function are weakness, impaired endurance, impaired self care skills, gait instability, impaired balance, decreased lower extremity function, decreased coordination, decreased safety awareness, pain, impaired cardiopulmonary response to activity.     Rehab Prognosis:  Poor; patient would benefit from acute skilled OT services to address these deficits and reach maximum level of function.       Plan:     Patient to be seen 5 x/week to address the above listed problems via self-care/home management, therapeutic activities, therapeutic exercises  Plan of Care Expires: 01/20/23  Plan of Care Reviewed with: patient    Subjective     Pain/Comfort:  Pain Rating 1:  (Patient reporting generalized pain throughout body but did not rate on a numerical scale)    Objective:     Communicated with: Nurse, OT prior to session.  Patient found supine with PureWick, arterial line upon OT entry to room.    General Precautions: Standard, contact, fall    Orthopedic Precautions:N/A  Braces: N/A  Respiratory Status: Room air     Occupational Performance:     Bed Mobility:    Patient completed Rolling/Turning to Left with  maximal assistance  Patient completed Rolling/Turning to Right with maximal assistance  Patient completed Scooting/Bridging with maximal assistance  Patient completed Supine to Sit with maximal assistance  Patient completed Sit to Supine with maximal assistance       Activities of Daily Living:  Grooming: stand by assistance with setup      AMPAC 6 Click ADL: 16    Treatment & Education:  Pt alert and requiring  "moderate verbal encouragement for participation in therapy session. Pt transitioned to sitting EOB. Pt unable to remove hands from EOB and initially requiring mod A for maintaining upright posture/balance. Pt able to maintain upright position independently one scooted to EOB but unable to take hands off of bed to perform and tasks with BUE. Pt reamined sitting EOB ~23 mins before returning to supine position. Pt requiring max A for moving BLEs and for positioning UB to sit up and return to supine. Pt reporting fatigue at end of session and reports, "I need to get out of this room today or I'm going to lose my mind." Pt educated on ability to transfer to wheelchair being limited; pt v/u. Pt educated to continue with BUE ex HEP when able throughout the day when in bed. Pt v/u. Pt reporting comfort upon exit.    Patient left supine with all lines intact, call button in reach, and nurse notified    GOALS:   Multidisciplinary Problems       Occupational Therapy Goals          Problem: Occupational Therapy    Goal Priority Disciplines Outcome Interventions   Occupational Therapy Goal     OT, PT/OT Ongoing, Progressing    Description: Goals to be met by: 01/20/22     Patient will increase functional independence with ADLs by performing:    UE Dressing with Set-up Assistance.  LE Dressing with Minimal Assistance.  Grooming while seated with Modified [place].  Toileting from toilet with Supervision for hygiene and clothing management.   Bathing from  shower chair/bench with Minimal Assistance.  Step transfer with Minimal Assistance  Toilet transfer to toilet with Minimal Assistance.  Increased functional strength to 4/5 for bilateral UE's.                         Time Tracking:     OT Date of Treatment: 01/17/23  OT Start Time: 1010  OT Stop Time: 1053  OT Total Time (min): 43 min    Billable Minutes:Therapeutic Activity 43    OT/ROSS: ROSS     ROSS Visit Number: 1 1/17/2023  "

## 2023-01-17 NOTE — PLAN OF CARE
Problem: Occupational Therapy  Goal: Occupational Therapy Goal  Description: Goals to be met by: 01/20/22     Patient will increase functional independence with ADLs by performing:    UE Dressing with Set-up Assistance.  LE Dressing with Minimal Assistance.  Grooming while seated with Modified [place].  Toileting from toilet with Supervision for hygiene and clothing management.   Bathing from  shower chair/bench with Minimal Assistance.  Step transfer with Minimal Assistance  Toilet transfer to toilet with Minimal Assistance.  Increased functional strength to 4/5 for bilateral UE's.    Outcome: Ongoing, Progressing

## 2023-01-18 LAB
ALBUMIN SERPL BCP-MCNC: 2.1 G/DL (ref 3.5–5.2)
ALP SERPL-CCNC: 143 U/L (ref 55–135)
ALT SERPL W/O P-5'-P-CCNC: 31 U/L (ref 10–44)
ANION GAP SERPL CALC-SCNC: 2 MMOL/L (ref 8–16)
AST SERPL-CCNC: 51 U/L (ref 10–40)
BASOPHILS # BLD AUTO: 0.04 K/UL (ref 0–0.2)
BASOPHILS NFR BLD: 1.3 % (ref 0–1.9)
BILIRUB SERPL-MCNC: 1.7 MG/DL (ref 0.1–1)
BUN SERPL-MCNC: 20 MG/DL (ref 6–20)
CALCIUM SERPL-MCNC: 8.7 MG/DL (ref 8.7–10.5)
CHLORIDE SERPL-SCNC: 108 MMOL/L (ref 95–110)
CO2 SERPL-SCNC: 29 MMOL/L (ref 23–29)
CREAT SERPL-MCNC: 0.4 MG/DL (ref 0.5–1.4)
DIFFERENTIAL METHOD: ABNORMAL
EOSINOPHIL # BLD AUTO: 0.1 K/UL (ref 0–0.5)
EOSINOPHIL NFR BLD: 4.2 % (ref 0–8)
ERYTHROCYTE [DISTWIDTH] IN BLOOD BY AUTOMATED COUNT: 17 % (ref 11.5–14.5)
EST. GFR  (NO RACE VARIABLE): >60 ML/MIN/1.73 M^2
GLUCOSE SERPL-MCNC: 83 MG/DL (ref 70–110)
HCT VFR BLD AUTO: 31.6 % (ref 37–48.5)
HGB BLD-MCNC: 9.9 G/DL (ref 12–16)
IMM GRANULOCYTES # BLD AUTO: 0.02 K/UL (ref 0–0.04)
IMM GRANULOCYTES NFR BLD AUTO: 0.6 % (ref 0–0.5)
LYMPHOCYTES # BLD AUTO: 0.6 K/UL (ref 1–4.8)
LYMPHOCYTES NFR BLD: 19 % (ref 18–48)
MAGNESIUM SERPL-MCNC: 2 MG/DL (ref 1.6–2.6)
MCH RBC QN AUTO: 27.7 PG (ref 27–31)
MCHC RBC AUTO-ENTMCNC: 31.3 G/DL (ref 32–36)
MCV RBC AUTO: 88 FL (ref 82–98)
MONOCYTES # BLD AUTO: 0.3 K/UL (ref 0.3–1)
MONOCYTES NFR BLD: 11 % (ref 4–15)
NEUTROPHILS # BLD AUTO: 2 K/UL (ref 1.8–7.7)
NEUTROPHILS NFR BLD: 63.9 % (ref 38–73)
NRBC BLD-RTO: 0 /100 WBC
PLATELET # BLD AUTO: 131 K/UL (ref 150–450)
PMV BLD AUTO: 9.7 FL (ref 9.2–12.9)
POTASSIUM SERPL-SCNC: 4 MMOL/L (ref 3.5–5.1)
PROT SERPL-MCNC: 7.9 G/DL (ref 6–8.4)
RBC # BLD AUTO: 3.58 M/UL (ref 4–5.4)
SODIUM SERPL-SCNC: 139 MMOL/L (ref 136–145)
WBC # BLD AUTO: 3.1 K/UL (ref 3.9–12.7)

## 2023-01-18 PROCEDURE — 97530 THERAPEUTIC ACTIVITIES: CPT

## 2023-01-18 PROCEDURE — 25000003 PHARM REV CODE 250: Performed by: INTERNAL MEDICINE

## 2023-01-18 PROCEDURE — 99232 PR SUBSEQUENT HOSPITAL CARE,LEVL II: ICD-10-PCS | Mod: ,,, | Performed by: STUDENT IN AN ORGANIZED HEALTH CARE EDUCATION/TRAINING PROGRAM

## 2023-01-18 PROCEDURE — 80053 COMPREHEN METABOLIC PANEL: CPT | Performed by: INTERNAL MEDICINE

## 2023-01-18 PROCEDURE — 83735 ASSAY OF MAGNESIUM: CPT | Performed by: INTERNAL MEDICINE

## 2023-01-18 PROCEDURE — 27000207 HC ISOLATION

## 2023-01-18 PROCEDURE — 99900031 HC PATIENT EDUCATION (STAT)

## 2023-01-18 PROCEDURE — 36415 COLL VENOUS BLD VENIPUNCTURE: CPT | Performed by: INTERNAL MEDICINE

## 2023-01-18 PROCEDURE — 99900035 HC TECH TIME PER 15 MIN (STAT)

## 2023-01-18 PROCEDURE — 85025 COMPLETE CBC W/AUTO DIFF WBC: CPT | Performed by: INTERNAL MEDICINE

## 2023-01-18 PROCEDURE — 99232 SBSQ HOSP IP/OBS MODERATE 35: CPT | Mod: ,,, | Performed by: STUDENT IN AN ORGANIZED HEALTH CARE EDUCATION/TRAINING PROGRAM

## 2023-01-18 PROCEDURE — 11000001 HC ACUTE MED/SURG PRIVATE ROOM

## 2023-01-18 PROCEDURE — 25000003 PHARM REV CODE 250: Performed by: STUDENT IN AN ORGANIZED HEALTH CARE EDUCATION/TRAINING PROGRAM

## 2023-01-18 PROCEDURE — 25000003 PHARM REV CODE 250: Performed by: PSYCHIATRY & NEUROLOGY

## 2023-01-18 PROCEDURE — 97530 THERAPEUTIC ACTIVITIES: CPT | Mod: CO

## 2023-01-18 RX ADMIN — PANTOPRAZOLE SODIUM 40 MG: 40 TABLET, DELAYED RELEASE ORAL at 09:01

## 2023-01-18 RX ADMIN — HYDROCODONE BITARTRATE AND ACETAMINOPHEN 1 TABLET: 10; 325 TABLET ORAL at 09:01

## 2023-01-18 RX ADMIN — TRAZODONE HYDROCHLORIDE 100 MG: 100 TABLET ORAL at 09:01

## 2023-01-18 RX ADMIN — MAGNESIUM OXIDE TAB 400 MG (241.3 MG ELEMENTAL MG) 400 MG: 400 (241.3 MG) TAB at 09:01

## 2023-01-18 RX ADMIN — HYDROCODONE BITARTRATE AND ACETAMINOPHEN 1 TABLET: 10; 325 TABLET ORAL at 05:01

## 2023-01-18 RX ADMIN — POLYETHYLENE GLYCOL (3350) 17 G: 17 POWDER, FOR SOLUTION ORAL at 09:01

## 2023-01-18 RX ADMIN — GABAPENTIN 600 MG: 300 CAPSULE ORAL at 09:01

## 2023-01-18 RX ADMIN — LORAZEPAM 1 MG: 1 TABLET ORAL at 10:01

## 2023-01-18 RX ADMIN — LACTULOSE 20 G: 20 SOLUTION ORAL at 09:01

## 2023-01-18 RX ADMIN — POTASSIUM CHLORIDE 20 MEQ: 1500 TABLET, EXTENDED RELEASE ORAL at 09:01

## 2023-01-18 RX ADMIN — GABAPENTIN 600 MG: 300 CAPSULE ORAL at 04:01

## 2023-01-18 RX ADMIN — BUPROPION HYDROCHLORIDE 300 MG: 150 TABLET, EXTENDED RELEASE ORAL at 09:01

## 2023-01-18 RX ADMIN — AMOXICILLIN 1000 MG: 250 CAPSULE ORAL at 09:01

## 2023-01-18 RX ADMIN — LORAZEPAM 1 MG: 1 TABLET ORAL at 09:01

## 2023-01-18 NOTE — ASSESSMENT & PLAN NOTE
This patient does have evidence of infective focus  My overall impression is sepsis. Vital signs were reviewed and noted in progress note.  Antibiotics given-   Antibiotics (From admission, onward)    Start     Stop Route Frequency Ordered    01/14/23 0900  amoxicillin capsule 1,000 mg         -- Oral Every 12 hours 01/13/23 1947 01/02/23 1515  meropenem (MERREM) 1 g in sodium chloride 0.9 % 100 mL IVPB (MB+)        [facility].    01/03 0714 IV Every 8 hours (non-standard times) 01/02/23 0923    12/23/22 0533  vancomycin (VANCOCIN) 1,000 mg injection        Note to Pharmacy: Created by cabinet override    12/23 1744   12/23/22 0533    12/23/22 0405  piperacillin-tazobactam (ZOSYN) 4.5 gram injection        Note to Pharmacy: Created by cabinet override    12/23 1614   12/23/22 0405        Cultures were taken-   Microbiology Results (last 7 days)     Procedure Component Value Units Date/Time    Blood culture [[id number]] Collected: 01/10/23 2348    Order Status: Completed Specimen: Blood Updated: 01/16/23 0612     Blood Culture, Routine No growth after 5 days.    Blood culture [[id number]] Collected: 01/10/23 2344    Order Status: Completed Specimen: Blood Updated: 01/16/23 0612     Blood Culture, Routine No growth after 5 days.    Blood culture [[id number]] Collected: 01/10/23 0616    Order Status: Completed Specimen: Blood Updated: 01/15/23 2012     Blood Culture, Routine No growth after 5 days.    Blood culture [[id number]] Collected: 01/10/23 0621    Order Status: Completed Specimen: Blood Updated: 01/15/23 2012     Blood Culture, Routine No growth after 5 days.        Latest lactate reviewed, they are-  No results for input(s): LACTATE in the last 72 hours.    Organ dysfunction indicated by Acute kidney injury, Encephalopathy  and Acute liver injury  Source- ?    Source control Achieved by- see orders  - Merrem day 7 of 7 (completed on 1/3/23)  - Blood cx x2 (1/2/23) final report  no growth    1/7 resolved  1/10 overnight elevated temperature reading, no worsening overall pain. Follow up Blood cultures and imagining.   1/11 second evening with elevated temperature >101F with symptoms, started Merrem  1/13 48 hours of defervescence, NGTD x 3 days on blood cultures (1/10 and 1/11), will discontinue Merrem. Per discussion with Pharm ID, will resume suppressive therapy with amoxicillin 1g BID starting 1/14 1/16 Blood cultures remain negative. Continue suppressive therapy  1/18 Continue with suppressive therapy

## 2023-01-18 NOTE — ASSESSMENT & PLAN NOTE
Labs noted, chronic stable.  AST   Date Value Ref Range Status   01/18/2023 51 (H) 10 - 40 U/L Final   01/17/2023 59 (H) 10 - 40 U/L Final   01/16/2023 59 (H) 10 - 40 U/L Final   01/15/2023 66 (H) 10 - 40 U/L Final   01/14/2023 58 (H) 10 - 40 U/L Final     ALT   Date Value Ref Range Status   01/18/2023 31 10 - 44 U/L Final   01/17/2023 36 10 - 44 U/L Final   01/16/2023 33 10 - 44 U/L Final   01/15/2023 34 10 - 44 U/L Final   01/14/2023 28 10 - 44 U/L Final        Alkaline Phosphatase   Date Value Ref Range Status   01/18/2023 143 (H) 55 - 135 U/L Final   01/17/2023 159 (H) 55 - 135 U/L Final   01/16/2023 143 (H) 55 - 135 U/L Final   01/15/2023 149 (H) 55 - 135 U/L Final   01/14/2023 137 (H) 55 - 135 U/L Final

## 2023-01-18 NOTE — ASSESSMENT & PLAN NOTE
Check xray of back due to pain, increase in scoliosis.   1/10 given proximal muscle weakness with slow improvement with daily PT and OT, will follow up MRI brain and spine  Limitations with facility's imagining capabilities, cannot rule out cord compression over thoracic and lumbar spine.   [facility] neurosurgery has accepted return of patient to their service for further evaluation. Currently on hospital wide diversion.     1/15: [facility] does have a bed available will transfer for higher level of care at this time.    MRI brain and c spine below.  Cannot r/u cord compression over thoracic and lumbar spine.  Continue with plan for transfer.     MRI BRAIN WITHOUT CONTRAST     CLINICAL HISTORY:  Debility, generalized weakness and pain     COMPARISON:  MRI brain 02/14/2022     TECHNIQUE:  Multiplanar multisequence MR imaging performed of the brain without contrast     FINDINGS:  No detected diffusion signal changes to suggest recent ischemia.  No ventricular or basal cistern effacement.  No midline shift or mass effect.  Major intracranial flow voids appear intact.  Paranasal sinuses and mastoid air cells are clear.  Calvarial signal is intact.  No signal change to suggest recent or remote hemorrhage.  Prominent gliotic nonspecific white matter signal change in the subcortical and periventricular regions of bilateral cerebral hemispheres unchanged in extent and distribution compared to the prior study.  Corpus callosum, pituitary gland, and remaining midline structures are unremarkable.     Impression:     1. No acute intracranial abnormality identified.  2. Nonspecific gliotic white matter signal change within bilateral cerebral hemispheres similar in extent and distribution greater than expected for patient age.  This may be related to chronic small vessel ischemia.  Numerous chronic systemic illnesses can result in this appearance.  Demyelinating disease is also in the differential.        Electronically  signed by: Deion Patel MD  Date:                                            01/10/2023  Time:                                           14:55    Narrative & Impression  EXAMINATION:  MRI CERVICAL SPINE WITHOUT CONTRAST     CLINICAL HISTORY:  Generalized weakness     COMPARISON:  No priors available     TECHNIQUE:  Multiplanar multisequence MR imaging performed of the cervical spine without contrast     FINDINGS:  Imaged portions of the spinal cord demonstrate normal size and signal.  Vertebral bodies are normal in height and alignment.  Artifact present on portions of this exam.  Degenerative related endplate signal changes at C4 through C7.  Marginal osteophytes are present at these levels.     C2-C3: Disc normal in height without herniation.  No spinal canal or foraminal narrowing detected.     C3-C4: Slight loss of disc space height without significant herniation.  No spinal canal or left foraminal narrowing.  Mild right foraminal narrowing related to uncovertebral osteophyte.     C4-C5: Loss of disc space height with broad-based disc osteophyte.  Disc osteophyte approximates the anterior aspect of the cord right of midline.  Artifact limits evaluation.  The may be trace indentation of the cord.  AP diameter of the thecal sac measures 6 mm in this region.  Neural foramen are not well evaluated secondary to artifact.     C5-C6: Loss of disc space height with broad-based disc osteophyte resulting in mild indentation on the anterior thecal sac approximating the anterior cord without definite cord flattening.  AP diameter of the thecal sac measures just over 8 mm.  Neural foramen are not well evaluated secondary to artifact.  Prominent uncovertebral osteophytes do however appear to be present.     C6-C7: Loss of disc space height with mild broad-based disc osteophyte resulting in mild indentation on the anterior thecal sac, but no cord flattening.  AP diameter of the thecal sac measures 8.5 mm.  Artifact limits  evaluation of the neural foramen, but prominent bilateral uncovertebral osteophytes appear to be present.     C7-T1: Maintained disc space height with minimal protrusion at the left lateral recess resulting in no significant spinal canal narrowing.  Limited evaluation of the neural foramen secondary to artifact.     Impression:     Suboptimal study secondary to artifact on portions of this exam.  Multilevel spondylosis is present extending from C3-C7 with individual levels detailed above.        Electronically signed by: Deion Patel MD  Date:                                            01/10/2023  Time:                                           15:01

## 2023-01-18 NOTE — PT/OT/SLP PROGRESS
Physical Therapy Treatment    Patient Name:  Rachel Zazueta   MRN:  [id number]    Recommendations:     Discharge Recommendations: acute care hospital  Discharge Equipment Recommendations: wheelchair  Barriers to discharge:  Increased mob and transfer needs    Assessment:     Rachel Zazueta is a 42 y.o. female admitted with a medical diagnosis of Debility.  She presents with the following impairments/functional limitations: weakness, impaired endurance, impaired self care skills, impaired functional mobility, impaired sensation, gait instability, impaired balance, decreased coordination, decreased lower extremity function, pain, decreased safety awareness, decreased ROM . Upon arrival, pt was elevated HOB on room air. Pt was MaxA sup<>sit. Fair static sitting balance, increasing energy expenditure during session. TA for self care w/ b/l rolling modA.    Rehab Prognosis: Good; patient would benefit from acute skilled PT services to address these deficits and reach maximum level of function.    Recent Surgery: * No surgery found *      Plan:     During this hospitalization, patient to be seen 5 x/week to address the identified rehab impairments via therapeutic activities, gait training, therapeutic exercises, neuromuscular re-education and progress toward the following goals:    Plan of Care Expires:  01/26/23    Subjective     Chief Complaint: make sure you my back and legs are moved at the same time  Patient/Family Comments/goals: None stated  Pain/Comfort:  Pain Rating 1: other (see comments) (Pain reported during mvmt only but did not rate)  Location - Side 1: Bilateral  Location - Orientation 1: lower  Location 1: back  Pain Addressed 1: Pre-medicate for activity, Reposition, Cessation of Activity      Objective:     Communicated with Vannesa CARTER prior to session.  Patient found HOB elevated with PureWick, arterial line upon PT entry to room.     General Precautions: Standard, fall, contact  Orthopedic  Precautions: N/A  Braces: N/A  Respiratory Status: Room air     Functional Mobility:  Bed Mobility:     Supine to Sit: maximal assistance  Sit to Supine: maximal assistance  Balance: Fair static sitting balance, increasing energy expenditure      AM-PAC 6 CLICK MOBILITY  Turning over in bed (including adjusting bedclothes, sheets and blankets)?: 2  Sitting down on and standing up from a chair with arms (e.g., wheelchair, bedside commode, etc.): 1  Moving from lying on back to sitting on the side of the bed?: 2  Moving to and from a bed to a chair (including a wheelchair)?: 1  Need to walk in hospital room?: 1  Climbing 3-5 steps with a railing?: 1  Basic Mobility Total Score: 8       Treatment & Education:  Importance of mob, safety awareness, transfer training    Patient left HOB elevated with all lines intact, call button in reach, and PCT present..    GOALS:   Multidisciplinary Problems       Physical Therapy Goals          Problem: Physical Therapy    Goal Priority Disciplines Outcome Goal Variances Interventions   Physical Therapy Goal     PT, PT/OT Ongoing, Not Progressing     Description: Goals to be met by: 2023     Patient will increase functional independence with mobility by performin. Supine to sit with Modified [place]  2. Sit to supine with Modified [place]  3. Sit to stand transfer with Stand-by Assistance  4. Bed to chair transfer with Stand-by Assistance using Rolling Walker  5. Gait  x 50 feet with Contact Guard Assistance using Rolling Walker.   6. Sitting at edge of bed x10 minutes with [place] to perform (B) LE therex                           Time Tracking:     PT Received On: 23  PT Start Time: 832     PT Stop Time: 905  PT Total Time (min): 33 min     Billable Minutes: Therapeutic Activity 33    Treatment Type: Treatment  PT/PTA: PT     PTA Visit Number: 0     2023

## 2023-01-18 NOTE — PROGRESS NOTES
"[facility] Surg  Adult Nutrition  Progress Note    SUMMARY       Recommendations  1. Rec'd continue current diet order: Heart Healthy.   2. Rec'd ONS: Ensure Enlive BID to provide 700kcal and 40g protein.   3. Rec'd encourage PO intake and compliance with drinking ONS.   4. RD to follow and make rec's accordingly.  Goals: 1. Pt will consume >75% EEN via PO intake by next RD follow up.  Nutrition Goal Status: goal not met  Communication of RD Recs: reviewed with RN    Reason for Assessment    Reason For Assessment: RD follow-up  Diagnosis: other (see comments) (Debility)  Relevant Medical History: Anemia, Anxiety, Depressed, Diskitis, Chronic Hep C w/o coma,  IV drug abuse, Kidney stone, Liver cirrhosis  Interdisciplinary Rounds: did not attend  General Information Comments: Followed up on pt this afternoon. Pt is eating about 25% of meals but she is drinking her ONS. As per RN, pt does not like any of the food she recieves. Rec'd continute to encourage PO intake and compliance with drinking ONS. RD to follow and make rec's accordingly.  Nutrition Discharge Planning: TBD as care progresses    Nutrition Risk Screen    Nutrition Risk Screen: no indicators present    Nutrition/Diet History    Spiritual, Cultural Beliefs, Methodist Practices, Values that Affect Care: no  Food Allergies: shellfish, tree nut (iodine and iodide containing products)  Factors Affecting Nutritional Intake: decreased appetite    Anthropometrics    Temp: 98.2 °F (36.8 °C)  Height: 5' 7" (170.2 cm)  Height (inches): 67 in  Weight Method: Bed Scale  Weight: 110.6 kg (243 lb 12.8 oz)  Weight (lb): 243.8 lb  Ideal Body Weight (IBW), Female: 135 lb  % Ideal Body Weight, Female (lb): 159.22 %  BMI (Calculated): 38.2  BMI Grade: 30 - 34.9- obesity - grade I    Lab/Procedures/Meds    Pertinent Labs Reviewed: reviewed  Pertinent Medications Reviewed: reviewed    Estimated/Assessed Needs    Weight Used For Calorie Calculations: 70.2 kg (154 lb 11.9 " oz) (AdjBW)  Energy Calorie Requirements (kcal): 4445-9981 (20-25kcal/kg AdjBW)  Energy Need Method: Kcal/kg  Protein Requirements: 49-61 (0.8-1.0g/kg IBW)  Weight Used For Protein Calculations: 61.2 kg (134 lb 15.8 oz) (IBW)  Fluid Requirements (mL): 4808-0030 (1mL/kcal)  Estimated Fluid Requirement Method: RDA Method  RDA Method (mL): 1404    Nutrition Prescription Ordered    Current Diet Order: Heart Healthy  Oral Nutrition Supplement: Ensure Enlive (TID)    Evaluation of Received Nutrient/Fluid Intake    % Kcal Needs: 25%  % Protein Needs: 25%  I/O: -280  Energy Calories Required: not meeting needs  Protein Required: not meeting needs  Tolerance: tolerating  % Intake of Estimated Energy Needs: 25 - 50 %  % Meal Intake: 25 - 50 %    Nutrition Risk    Level of Risk/Frequency of Follow-up: moderate     Monitor and Evaluation    Food and Nutrient Intake: energy intake, food and beverage intake  Food and Nutrient Adminstration: diet order  Knowledge/Beliefs/Attitudes: food and nutrition knowledge/skill, beliefs and attitudes  Physical Activity and Function: nutrition-related ADLs and IADLs  Anthropometric Measurements: weight, height/length, body mass index, weight change  Biochemical Data, Medical Tests and Procedures: electrolyte and renal panel, gastrointestinal profile, glucose/endocrine profile, inflammatory profile, lipid profile  Nutrition-Focused Physical Findings: overall appearance     Nutrition Follow-Up    RD Follow-up?: Yes

## 2023-01-18 NOTE — PT/OT/SLP PROGRESS
Occupational Therapy   Treatment    Name: Rachel Zazueta  MRN: [id number]  Admitting Diagnosis:  Debility       Recommendations:     Discharge Recommendations: acute care hospital  Discharge Equipment Recommendations:  wheelchair  Barriers to discharge:   (Further medical care required)    Assessment:     Rachel Zazueta is a 42 y.o. female with a medical diagnosis of Debility. Performance deficits affecting function are weakness, impaired endurance, impaired self care skills, impaired balance, decreased coordination, decreased lower extremity function, pain.     Rehab Prognosis:  Poor; patient would benefit from acute skilled OT services to address these deficits and reach maximum level of function.       Plan:     Patient to be seen 5 x/week to address the above listed problems via self-care/home management, therapeutic activities, therapeutic exercises  Plan of Care Expires: 01/20/23  Plan of Care Reviewed with: patient    Subjective     Pain/Comfort:  Pain Rating 1:  (Pt did not rate but refusing to transition out of supine due to pain in back)    Objective:     Communicated with: Nurse, OT prior to session.  Patient found supine with PureWick, arterial line upon OT entry to room.    General Precautions: Standard, fall, contact    Orthopedic Precautions:N/A  Braces: N/A  Respiratory Status: Room air     [facility] 6 Click ADL: 15    Treatment & Education:  Upon entering room pt reporting she would not be participating in therapy interventions this day. Pt heavily educated on importance of participation and its impact on rehab potential. Pt reporting she is in too much pain in her back and then reported it was generalized pain but did not rate. Pt in supine with HOB elevated to ~25 degrees and refusing to allow COTTON to raise HOB any higher. Pt educated on HEP with BUE ex and pt able to demonstrate competency. Pt also educated to initiate trunk strengthening isometric ex. Pt v/u but reported she wasn't sure if she  could due to back pain. Pt receiving medication from nurse while COTTON in room. Pt reporting comfort upon exit.      Patient left supine with all lines intact, call button in reach, and nurse notified    GOALS:   Multidisciplinary Problems       Occupational Therapy Goals          Problem: Occupational Therapy    Goal Priority Disciplines Outcome Interventions   Occupational Therapy Goal     OT, PT/OT Ongoing, Progressing    Description: Goals to be met by: 01/20/22     Patient will increase functional independence with ADLs by performing:    UE Dressing with Set-up Assistance.  LE Dressing with Minimal Assistance.  Grooming while seated with Modified [place].  Toileting from toilet with Supervision for hygiene and clothing management.   Bathing from  shower chair/bench with Minimal Assistance.  Step transfer with Minimal Assistance  Toilet transfer to toilet with Minimal Assistance.  Increased functional strength to 4/5 for bilateral UE's.                         Time Tracking:     OT Date of Treatment: 01/18/23  OT Start Time: 1000  OT Stop Time: 1015  OT Total Time (min): 15 min    Billable Minutes:Therapeutic Activity 15    OT/ROSS: ROSS     ROSS Visit Number: 1 1/18/2023

## 2023-01-18 NOTE — PLAN OF CARE
Problem: Occupational Therapy  Goal: Occupational Therapy Goal  Description: Goals to be met by: 01/20/22     Patient will increase functional independence with ADLs by performing:    UE Dressing with Set-up Assistance.  LE Dressing with Minimal Assistance.  Grooming while seated with Modified [place].  Toileting from toilet with Supervision for hygiene and clothing management.   Bathing from  shower chair/bench with Minimal Assistance.  Step transfer with Minimal Assistance  Toilet transfer to toilet with Minimal Assistance.  Increased functional strength to 4/5 for bilateral UE's.    Outcome: Ongoing, Progressing

## 2023-01-18 NOTE — ASSESSMENT & PLAN NOTE
Patient has recurrent depression which is moderate and is currently controlled. Will Continue anti-depressant medications. We will consult psychiatry at this time. Patient does not display psychosis at this time. Continue to monitor closely and adjust plan of care as needed.  1/12 bupropion increase to 300 mg daily, gabapentin increase to 400 mg TID  1/16 anxiolytic increased to IV ativan 1mg Q6HPRN with continued daily buproprion, gabapentin, tizanidine  1/17 IV ativan PRN, gabapentin increased 600 mg TID, continue buproprion, tizanidine, add trazodone at bedtime  1/18 small improvement, continue with above changes

## 2023-01-18 NOTE — PROGRESS NOTES
"[facility] Medicine  Progress Note    Patient Name: Rachel Zazueta  MRN: [id number]  Patient Class: IP- Inpatient   Admission Date: 12/23/2022  Length of Stay: 26 days  Attending Physician: Dannielle Merino DO  Primary Care Provider: Dannielle Merino DO        Subjective:     Principal Problem:Debility    HPI:  ER HPI:  42-year-old female with a history of anemia, anxiety, recurrent cellulitis, hepatitis C, IVDU, liver cirrhosis, cholecystectomy, kidney stones, lumbar fusion, shoulder surgery, chronic back pain comes in c/o generalized weakness, fatigue, and bilateral flank pain.  She reports that this has been going on intermittently for several days and was diagnosed with a UTI recently.  No fever.  No abdominal pain or vomiting or diarrhea.     IM HPI:  Patient is well known to our service.  Patient has a history of IV drug abuse and unfortunately has relapsed.  Patient has a history of a epidural abscess requiring a large lumbar surgery with multiple rehabilitative stays and therapy.  The patient was progressing to ambulation and recently has declined.  Patient admits to starting drugs including IV drug use again.  Patient presented to the ED with the above symptoms and she has a noticeable cardiac murmur today that we are getting a stat echocardiogram.  Patient's been started on antibiotics fluid resuscitated and seems to have sepsis.  Patient's urinary studies were abnormal but not overwhelming.      Overview/Hospital Course:  12/24/22 CG: Patient remains in the ICU today. Has a lot of muscle spasms and low back pain. Echocardiogram ordered yesterday and completed; significant for pulm HTN but without vegetations.   12/25/22 CG:  Overnight she was having a lot of significant discomfort and pain.  We placed her on Precedex and this has helped significantly with her body aches, her irritability and muscle spasms."  12/26/22 FM:  Patient required Precedex for agitation and mood " changes.  Patient is calm and cooperative this morning.  Patient appears to have E coli sepsis so will taper antibiotics.  Patient's echocardiogram showed no vegetation and E coli would be low risk for metastatic infection.  Will transfer the patient to the floor once her Precedex discontinue we counseled her again today on the importance of avoiding substances and illicit drugs.  This continues to be setback for her.  12/27/22 WC:  Patient overall remains stable and is slowly improving.  Urine culture has revealed Candida albicans in addition to blood culture revealing E coli.  12/28/22 WC:  Patient resting comfortably this morning.  Pharm ID on case for treatment duration recs.    12/29/22 WC:  no acute events overnight.  Back pain at this time.   12/20/22:  no acute events continue IV ABX for esbl  12/31/22 CG: stable, continuing antibiotics.   1/1/2 CG: stable, continuing antibiotics. Has not had a bowel movement in two weeks. Will give lactulose.    01/02/2023: Patient did have a bowel movement yesterday.  Completing last day of antimicrobial therapy anticipated discharge tomorrow.  1/3/2023: Improving BM with lactulose. Lower extremity weakness requiring aggressive PT and OT efforts. Patient understands she needs to be an active participant. Encouraged inbed physical exercises without assistance with therapists. Stable Hg/HCT.   1/4/2023: Lactulose every other day for regular bowel movement. PT and OT recommendations with continue efforts to help with lower extremity weakness which likely precipitated from minimal participation from patient over the past week to remain physically active. Hg/HCT remains stable. Last blood Cx x2 (12/28) NGTD x5 days. Patient agrees moving forward lower extremity strengthening will have to take place with her participation and motivation. Will discharge with continue PT and OT efforts outpatient. She acknowledges outpatient follow up with GI/hepatologist, hematologist and  neurologist required to address all other chronic medication conditions.   1/5/2023: Overnight report from nursing that patient exhibited weakness in lower extremities and in upper body strength to support self to safely transfer from bed to commode or even bed to wheelchair. CM involved in placement to SNF. Continue with PT and OT. All other medical condition addressed during hospitalization remains stable.  1/6/2023: CM working on SNF placement. Patient expresses continue effort on her part to regain her strength, reassured her that she has been able to participate following the leads of PT and OT and so a new injury is less likely at this time. While small, she endorsed sitting up at bedside with legs dangle.    1/7/23 ND patient feeling well this morning no complaints will continue therapy services and rehab placement  1/8/23 ND pt reports some back pain will repeat x-rays to make sure everything looks okay from previous surgery.  Otherwise she is doing well  1/9/23 Repeat xray showing increased kyphosis over TL junction. Today, pain associated with massaging feet bilaterally with no worsening of numbness and tingling. No reported concerns with PT and OT efforts. Awaiting placement.  1/10/23 Overnight elevated temperature >101F, no further discomfort at this time and on morning exam denies further symptoms and worsening pain over joints and spine. PT and OT efforts moving forward, but still has significant muscle weakness proximally and over her core. White cells reduced, CRP elevated. Will obtain MRI scan brain and spine to follow up with worsening scoliosis, identify occult findings.    1/11/23 Second evening with elevated temp >101F with chills and tachycardia. Sepsis precautions taken with labs and cultures. No obvious infectious source, but empirically started merrem for neutropenic fever precautions. Patient this morning endorses good appetite and hopeful in being able to sit up longer today. Yesterday,  she was able to stay seated 10 minutes from 3 minutes, the day prior. Pancytopenia profile back to patient's baseline. Overall pain stable and no reported worsening. Lower extremity weakness with loss of flexion at knees and hips against gravity still prominent. Will appreciate neurology consult. Continue PT and OT.   1/12/23 Neurology consult for paraparesis, differential diagnoses include demyelinating disease, ischemic changes (MRI white matter changes), spinal cord lesions (pt with history of past epidural abscesses), compressive myelopathy.   - MRI brain with white matter changes that are concerning for demyelinating disease vs ischemic change  -Thoracic spine radiographs, three views, demonstrate multilevel thoracolumbar fusion with local kyphosis at T9-10, increased when compared to scoliosis series of 04/14/2022, measuring approximately 40 degree on today's exam and 30° on prior exam  Current facility with CT imagining limitations to further determine nature of spinal compression likely affecting bilateral lower extremity, hyperreflexia and reported altered sensation to feet. Pulses intact bilaterally.   Currently awaiting diversion at main [facility] to be lifted for transfer to neurosurgery service with further neurology work up.   No fever overnight. Blood cultures NGTD x 2 days. Will continue merrem for another 24 hours, then discontinue if no elevated temperature reading.   1/13/23 No fever overnight, WBC dropped again. NGTD x3 days on all blood cultures collected after febrile episodes x2, will discontinue merrem and resume suppressive amoxicillin Q12H secondary to partial hardware retention and hx of GBS. Early PM started to complain of  muscle spasms over her entire back that would release with breathing. Tizanidine started and pain control improved. Awaiting transfer to neurosurgery service for advanced imagining and neurology consult.   1/14/23 WC: patient afebrile overnight.  Blood cultures remain  negative.  Tolerating transition to suppressive abx therapy with amoxicillin secondary to partial hardware retention.  Endorses ongoing weakness to bilateral lower ext.  Positive babinski bilaterally.  Transfer for advanced imaging and neuro consult.   01/15/2023:  A bed has become available at [facility] main campus.  Patient will be transferred for high-level care to include Hospital Medicine, Neurosurgery and any other potential sources she may need.  We appreciate their assistance with transfer.  1/16/23: No transfer overnight. Patient increasingly experiencing pain focal tenderness over thoracic spine. Patient increasingly agitated and anxious, tearful. Will f/u neurology and psychiatry. Awaiting transfer to care of neurosurgery at [facility].   1/17/23: No transfer overnight. Pain control with increased hydrocodone, gabapentin and adding trazodone at bedtime. Will discontinue xanax PRN to ativan PRN for increasing anxiety. Diversion has been lifted today, awaiting discharges for transfer to move forward.   1/18/23: Increased hydrocodone, added trazodone to increased gabapentin have helped ease pain. Awaiting transfer to [facility] for advanced imagining, neurosurgery and neurology consults.       Interval History: Patient seen and examined.     Review of Systems   Constitutional:  Negative for activity change, appetite change, chills, diaphoresis, fatigue and fever.   HENT:  Negative for sore throat.    Eyes:  Negative for pain.   Respiratory:  Negative for cough, choking, chest tightness and shortness of breath.    Cardiovascular:  Negative for chest pain, palpitations and leg swelling.   Gastrointestinal:  Negative for abdominal distention, abdominal pain, constipation, diarrhea, nausea, rectal pain and vomiting.   Genitourinary:  Negative for dyspareunia, dysuria, flank pain, frequency, genital sores, hematuria and menstrual problem.   Musculoskeletal:  Positive for arthralgias, back pain, gait problem, myalgias  and neck pain. Negative for neck stiffness.   Skin:  Negative for pallor, rash and wound.   Neurological:  Positive for weakness. Negative for dizziness, tremors, speech difficulty and headaches.   Psychiatric/Behavioral:  Positive for dysphoric mood. Negative for agitation, behavioral problems, confusion, decreased concentration, self-injury, sleep disturbance and suicidal ideas. The patient is nervous/anxious.    Objective:     Vital Signs (Most Recent):  Temp: 98.2 °F (36.8 °C) (01/18/23 0731)  Pulse: 109 (01/18/23 0731)  Resp: 18 (01/18/23 0731)  BP: 115/67 (01/18/23 0731)  SpO2: (!) 94 % (01/18/23 0731)   Vital Signs (24h Range):  Temp:  [97.8 °F (36.6 °C)-98.5 °F (36.9 °C)] 98.2 °F (36.8 °C)  Pulse:  [104-122] 109  Resp:  [18-20] 18  SpO2:  [94 %-100 %] 94 %  BP: (108-147)/(61-80) 115/67     Weight: 110.6 kg (243 lb 12.8 oz)  Body mass index is 38.18 kg/m².    Intake/Output Summary (Last 24 hours) at 1/18/2023 0930  Last data filed at 1/18/2023 0650  Gross per 24 hour   Intake 960 ml   Output 1000 ml   Net -40 ml      Physical Exam  Vitals and nursing note reviewed.   Constitutional:       General: She is not in acute distress.     Appearance: She is obese.   HENT:      Head: Normocephalic and atraumatic.      Right Ear: External ear normal.      Left Ear: External ear normal.      Nose: Nose normal. No congestion or rhinorrhea.      Mouth/Throat:      Mouth: Mucous membranes are dry.      Pharynx: No oropharyngeal exudate.   Eyes:      General: No scleral icterus.     Extraocular Movements: Extraocular movements intact.      Conjunctiva/sclera: Conjunctivae normal.   Neck:      Vascular: No carotid bruit.      Comments: Limited ROM at baseline  Cardiovascular:      Rate and Rhythm: Normal rate and regular rhythm.      Heart sounds: Murmur heard.     No friction rub. No gallop.   Pulmonary:      Effort: No respiratory distress.      Breath sounds: No stridor. No wheezing, rhonchi or rales.   Chest:      Chest  wall: No tenderness.   Abdominal:      General: There is no distension.      Palpations: Abdomen is soft. There is no mass.      Tenderness: There is no abdominal tenderness. There is no right CVA tenderness, left CVA tenderness or guarding.   Musculoskeletal:         General: No swelling, tenderness or deformity.      Cervical back: Normal range of motion and neck supple. No rigidity or tenderness.      Right lower leg: No edema.      Left lower leg: No edema.   Lymphadenopathy:      Cervical: No cervical adenopathy.   Skin:     General: Skin is dry.      Capillary Refill: Capillary refill takes less than 2 seconds.      Coloration: Skin is not jaundiced or pale.      Findings: No bruising or rash.   Neurological:      Mental Status: She is oriented to person, place, and time. Mental status is at baseline.      Cranial Nerves: No cranial nerve deficit.      Sensory: Sensory deficit present.      Motor: Weakness (unable to lift legs against gravity, also unable to plant feet with hips and knees flexed bilaterally in supine, weakness more prominent on left side, no SI joint tenderness) present.      Comments: Sensation diminished in bilateral legs   Positive babinski bilaterally.    Psychiatric:         Mood and Affect: Mood normal.         Behavior: Behavior normal.         Thought Content: Thought content normal.      Comments: Cooperative, but increasingly frustrated with fear of inability to walk      Significant Labs: All pertinent labs within the past 24 hours have been reviewed.  Blood Culture: No results for input(s): LABBLOO in the last 48 hours.  BMP:   Recent Labs   Lab 01/18/23 0526   GLU 83      K 4.0      CO2 29   BUN 20   CREATININE 0.4*   CALCIUM 8.7   MG 2.0     CBC:   Recent Labs   Lab 01/17/23 0519 01/18/23 0526   WBC 3.60* 3.10*   HGB 11.0* 9.9*   HCT 34.5* 31.6*    131*     CMP:   Recent Labs   Lab 01/17/23 0519 01/18/23 0526    139   K 3.7 4.0    108   CO2 27  29   GLU 94 83   BUN 14 20   CREATININE 0.5 0.4*   CALCIUM 8.6* 8.7   PROT 8.7* 7.9   ALBUMIN 2.3* 2.1*   BILITOT 1.7* 1.7*   ALKPHOS 159* 143*   AST 59* 51*   ALT 36 31   ANIONGAP 7* 2*     Magnesium:   Recent Labs   Lab 01/17/23  0519 01/18/23  0526   MG 1.8 2.0     Significant Imaging: I have reviewed all pertinent imaging results/findings within the past 24 hours.      Assessment/Plan:      * Debility  Lower extremity weakness bilaterally now requiring aggressive OT and PT efforts to prevent further weakness and also regain strength necessary to carry out ADLs.   - From early bed bound state, patient has required multiple nursing staff in safely getting in and out of bed   - Original discharge plans with outpatient rehab will cause more harm to patient at this time and case management working to find a placement at SNF with PT and OT efforts  1/9 continue efforts with PT and OT, awaiting placement  1/10 slow progress with PT and OT, will obtain MRI brain and spine to identify any concerning changes  1/11 MRI limitations to TL spine study secondary to hardware, brain and cervical scan noting no acute findings;   1. No acute intracranial abnormality identified.  2. Nonspecific gliotic white matter signal change within bilateral cerebral hemispheres similar in extent and distribution greater than expected for patient age. This may be related to chronic small vessel ischemia.  Numerous chronic systemic illnesses can result in this appearance. Demyelinating disease is also in the differential.  Multiple prolonged hospitalization, neurosurgical intervention, chronic inflammation, substance abuse all likely contributing to current state. Outpatient follow up with specialists have been interrupted with hospitalizations and inconclusive studies. Will follow up with neurology for evaluation and optimize current therapy.    1/13 Awaiting main campus diversion to be lifted for neurosurgery transfer  1/14:  Awaiting  transfer.  1/15:  Patient has been accepted to [facility] will transfer for neurosurgery evaluation and imaging.  1/16: overnight report, [facility] back on diversion, awaiting transfer  1/17: diversion lift, awaiting available bed for transfer  1/18: Awaiting transfer    Anxiety and depression  Patient has recurrent depression which is moderate and is currently controlled. Will Continue anti-depressant medications. We will consult psychiatry at this time. Patient does not display psychosis at this time. Continue to monitor closely and adjust plan of care as needed.  1/12 bupropion increase to 300 mg daily, gabapentin increase to 400 mg TID  1/16 anxiolytic increased to IV ativan 1mg Q6HPRN with continued daily buproprion, gabapentin, tizanidine  1/17 IV ativan PRN, gabapentin increased 600 mg TID, continue buproprion, tizanidine, add trazodone at bedtime  1/18 small improvement, continue with above changes            Pulmonary hypertension  Found incidentally on echocardiogram with evidence of elevated right sided pressures and dilated heart chambers on the right side. Unclear the exact etiology or class of Pulm HTN but given her significant substance abuse Class 1 certainly is a possibility.   - Will refer to outpatient pulmonologist  - Will consider a cariology consult while inpatient to see if they think it would be beneficial to have a heart cath done while inpatient      Severe sepsis  This patient does have evidence of infective focus  My overall impression is sepsis. Vital signs were reviewed and noted in progress note.  Antibiotics given-   Antibiotics (From admission, onward)    Start     Stop Route Frequency Ordered    01/14/23 0900  amoxicillin capsule 1,000 mg         -- Oral Every 12 hours 01/13/23 1947    01/02/23 1515  meropenem (MERREM) 1 g in sodium chloride 0.9 % 100 mL IVPB (MB+)        See Hyperspace for full Linked Orders Report.    01/03 0714 IV Every 8 hours (non-standard times) 01/02/23  0923    12/23/22 0533  vancomycin (VANCOCIN) 1,000 mg injection        Note to Pharmacy: Created by cabinet override    12/23 1744   12/23/22 0533    12/23/22 0405  piperacillin-tazobactam (ZOSYN) 4.5 gram injection        Note to Pharmacy: Created by cabinet override    12/23 1614   12/23/22 0405        Cultures were taken-   Microbiology Results (last 7 days)     Procedure Component Value Units Date/Time    Blood culture [[id number]] Collected: 01/10/23 2348    Order Status: Completed Specimen: Blood Updated: 01/16/23 0612     Blood Culture, Routine No growth after 5 days.    Blood culture [[id number]] Collected: 01/10/23 2344    Order Status: Completed Specimen: Blood Updated: 01/16/23 0612     Blood Culture, Routine No growth after 5 days.    Blood culture [[id number]] Collected: 01/10/23 0616    Order Status: Completed Specimen: Blood Updated: 01/15/23 2012     Blood Culture, Routine No growth after 5 days.    Blood culture [[id number]] Collected: 01/10/23 0621    Order Status: Completed Specimen: Blood Updated: 01/15/23 2012     Blood Culture, Routine No growth after 5 days.        Latest lactate reviewed, they are-  No results for input(s): LACTATE in the last 72 hours.    Organ dysfunction indicated by Acute kidney injury, Encephalopathy  and Acute liver injury  Source- ?    Source control Achieved by- see orders  - Merrem day 7 of 7 (completed on 1/3/23)  - Blood cx x2 (1/2/23) final report no growth    1/7 resolved  1/10 overnight elevated temperature reading, no worsening overall pain. Follow up Blood cultures and imagining.   1/11 second evening with elevated temperature >101F with symptoms, started Merrem  1/13 48 hours of defervescence, NGTD x 3 days on blood cultures (1/10 and 1/11), will discontinue Merrem. Per discussion with Pharm ID, will resume suppressive therapy with amoxicillin 1g BID starting 1/14 1/16 Blood cultures remain negative. Continue suppressive therapy  1/18 Continue with suppressive  therapy      Murmur, cardiac  No evidence of vegetations.     Mild episode of recurrent major depressive disorder  Resume home meds.   - lyrica discontinued  1/12 gabapentin increased to 400 mg TID  1/16 increase PRN anxiolytic  1/18 continue with changes above      Cannabis use disorder, moderate, dependence  As above.      Amphetamine use disorder, severe  Continue to  educate and support.      Spinal stenosis at L4-L5 level  Check xray of back due to pain, increase in scoliosis.   1/10 given proximal muscle weakness with slow improvement with daily PT and OT, will follow up MRI brain and spine  Limitations with facility's imagining capabilities, cannot rule out cord compression over thoracic and lumbar spine.   [facility] neurosurgery has accepted return of patient to their service for further evaluation. Currently on hospital wide diversion.     1/15: [facility] does have a bed available will transfer for higher level of care at this time.    MRI brain and c spine below.  Cannot r/u cord compression over thoracic and lumbar spine.  Continue with plan for transfer.     MRI BRAIN WITHOUT CONTRAST     CLINICAL HISTORY:  Debility, generalized weakness and pain     COMPARISON:  MRI brain 02/14/2022     TECHNIQUE:  Multiplanar multisequence MR imaging performed of the brain without contrast     FINDINGS:  No detected diffusion signal changes to suggest recent ischemia.  No ventricular or basal cistern effacement.  No midline shift or mass effect.  Major intracranial flow voids appear intact.  Paranasal sinuses and mastoid air cells are clear.  Calvarial signal is intact.  No signal change to suggest recent or remote hemorrhage.  Prominent gliotic nonspecific white matter signal change in the subcortical and periventricular regions of bilateral cerebral hemispheres unchanged in extent and distribution compared to the prior study.  Corpus callosum, pituitary gland, and remaining midline structures are  unremarkable.     Impression:     1. No acute intracranial abnormality identified.  2. Nonspecific gliotic white matter signal change within bilateral cerebral hemispheres similar in extent and distribution greater than expected for patient age.  This may be related to chronic small vessel ischemia.  Numerous chronic systemic illnesses can result in this appearance.  Demyelinating disease is also in the differential.        Electronically signed by: Deion Patel MD  Date:                                            01/10/2023  Time:                                           14:55    Narrative & Impression  EXAMINATION:  MRI CERVICAL SPINE WITHOUT CONTRAST     CLINICAL HISTORY:  Generalized weakness     COMPARISON:  No priors available     TECHNIQUE:  Multiplanar multisequence MR imaging performed of the cervical spine without contrast     FINDINGS:  Imaged portions of the spinal cord demonstrate normal size and signal.  Vertebral bodies are normal in height and alignment.  Artifact present on portions of this exam.  Degenerative related endplate signal changes at C4 through C7.  Marginal osteophytes are present at these levels.     C2-C3: Disc normal in height without herniation.  No spinal canal or foraminal narrowing detected.     C3-C4: Slight loss of disc space height without significant herniation.  No spinal canal or left foraminal narrowing.  Mild right foraminal narrowing related to uncovertebral osteophyte.     C4-C5: Loss of disc space height with broad-based disc osteophyte.  Disc osteophyte approximates the anterior aspect of the cord right of midline.  Artifact limits evaluation.  The may be trace indentation of the cord.  AP diameter of the thecal sac measures 6 mm in this region.  Neural foramen are not well evaluated secondary to artifact.     C5-C6: Loss of disc space height with broad-based disc osteophyte resulting in mild indentation on the anterior thecal sac approximating the anterior cord  without definite cord flattening.  AP diameter of the thecal sac measures just over 8 mm.  Neural foramen are not well evaluated secondary to artifact.  Prominent uncovertebral osteophytes do however appear to be present.     C6-C7: Loss of disc space height with mild broad-based disc osteophyte resulting in mild indentation on the anterior thecal sac, but no cord flattening.  AP diameter of the thecal sac measures 8.5 mm.  Artifact limits evaluation of the neural foramen, but prominent bilateral uncovertebral osteophytes appear to be present.     C7-T1: Maintained disc space height with minimal protrusion at the left lateral recess resulting in no significant spinal canal narrowing.  Limited evaluation of the neural foramen secondary to artifact.     Impression:     Suboptimal study secondary to artifact on portions of this exam.  Multilevel spondylosis is present extending from C3-C7 with individual levels detailed above.        Electronically signed by: Deion Patel MD  Date:                                            01/10/2023  Time:                                           15:01    Substance use disorder  Cessation advised. Follow up consult psychiatry.     Chronic hepatitis C with cirrhosis  Labs noted, will require follow up outpatient with GI to start treatment.       Cirrhosis of liver without ascites  Labs noted, chronic stable.  AST   Date Value Ref Range Status   01/18/2023 51 (H) 10 - 40 U/L Final   01/17/2023 59 (H) 10 - 40 U/L Final   01/16/2023 59 (H) 10 - 40 U/L Final   01/15/2023 66 (H) 10 - 40 U/L Final   01/14/2023 58 (H) 10 - 40 U/L Final     ALT   Date Value Ref Range Status   01/18/2023 31 10 - 44 U/L Final   01/17/2023 36 10 - 44 U/L Final   01/16/2023 33 10 - 44 U/L Final   01/15/2023 34 10 - 44 U/L Final   01/14/2023 28 10 - 44 U/L Final        Alkaline Phosphatase   Date Value Ref Range Status   01/18/2023 143 (H) 55 - 135 U/L Final   01/17/2023 159 (H) 55 - 135 U/L Final   01/16/2023 143  (H) 55 - 135 U/L Final   01/15/2023 149 (H) 55 - 135 U/L Final   01/14/2023 137 (H) 55 - 135 U/L Final            Pancytopenia  Counts are stable at this time.  Two days of leukocyte count dropping 2.39 to 1.76 and 1.42.   Overnight febrile event >101F, patient however feels better compared to yesterday evening.   1/11: Morning, vital signs stable, no tachycardia, BP normotensive.   - f/u blood cultures, NGTD x 1 day  - resumed empiric antibiotics, merrem after two days of elevated temperature >101F and downtrending ANC (900/ul)  1/13 drop in ANC to 900/ul, patient without symptoms, vital signs stable  1/16 stable    Patient has a leukocytosis.  Last trend as follows-   Lab Results   Component Value Date    WBC 3.10 (L) 01/18/2023    WBC 3.60 (L) 01/17/2023    WBC 3.01 (L) 01/16/2023     Recent Labs   Lab 01/16/23  0526 01/17/23  0519 01/18/23  0526   Hemoglobin 9.5 L 11.0 L 9.9 L         VTE Risk Mitigation (From admission, onward)         Ordered     IP VTE HIGH RISK PATIENT  Once         12/23/22 0647     Place sequential compression device  Until discontinued         12/23/22 0647                Discharge Planning   SHIRA: 1/15/2023     Code Status: Full Code   Is the patient medically ready for discharge?:     Reason for patient still in hospital (select all that apply): Consult recommendations, PT / OT recommendations and Pending disposition  Discharge Plan A: Skilled Nursing Facility   Discharge Delays: None known at this time              Dannielle Merino DO  Department of Hospital Medicine   [facility]

## 2023-01-18 NOTE — ASSESSMENT & PLAN NOTE
Lower extremity weakness bilaterally now requiring aggressive OT and PT efforts to prevent further weakness and also regain strength necessary to carry out ADLs.   - From early bed bound state, patient has required multiple nursing staff in safely getting in and out of bed   - Original discharge plans with outpatient rehab will cause more harm to patient at this time and case management working to find a placement at SNF with PT and OT efforts  1/9 continue efforts with PT and OT, awaiting placement  1/10 slow progress with PT and OT, will obtain MRI brain and spine to identify any concerning changes  1/11 MRI limitations to TL spine study secondary to hardware, brain and cervical scan noting no acute findings;   1. No acute intracranial abnormality identified.  2. Nonspecific gliotic white matter signal change within bilateral cerebral hemispheres similar in extent and distribution greater than expected for patient age. This may be related to chronic small vessel ischemia.  Numerous chronic systemic illnesses can result in this appearance. Demyelinating disease is also in the differential.  Multiple prolonged hospitalization, neurosurgical intervention, chronic inflammation, substance abuse all likely contributing to current state. Outpatient follow up with specialists have been interrupted with hospitalizations and inconclusive studies. Will follow up with neurology for evaluation and optimize current therapy.    1/13 Awaiting [facility] diversion to be lifted for neurosurgery transfer  1/14:  Awaiting transfer.  1/15:  Patient has been accepted to [facility] will transfer for neurosurgery evaluation and imaging.  1/16: overnight report, [facility] back on diversion, awaiting transfer  1/17: diversion lift, awaiting available bed for transfer  1/18: Awaiting transfer

## 2023-01-18 NOTE — SUBJECTIVE & OBJECTIVE
Interval History: Patient seen and examined.     Review of Systems   Constitutional:  Negative for activity change, appetite change, chills, diaphoresis, fatigue and fever.   HENT:  Negative for sore throat.    Eyes:  Negative for pain.   Respiratory:  Negative for cough, choking, chest tightness and shortness of breath.    Cardiovascular:  Negative for chest pain, palpitations and leg swelling.   Gastrointestinal:  Negative for abdominal distention, abdominal pain, constipation, diarrhea, nausea, rectal pain and vomiting.   Genitourinary:  Negative for dyspareunia, dysuria, flank pain, frequency, genital sores, hematuria and menstrual problem.   Musculoskeletal:  Positive for arthralgias, back pain, gait problem, myalgias and neck pain. Negative for neck stiffness.   Skin:  Negative for pallor, rash and wound.   Neurological:  Positive for weakness. Negative for dizziness, tremors, speech difficulty and headaches.   Psychiatric/Behavioral:  Positive for dysphoric mood. Negative for agitation, behavioral problems, confusion, decreased concentration, self-injury, sleep disturbance and suicidal ideas. The patient is nervous/anxious.    Objective:     Vital Signs (Most Recent):  Temp: 98.2 °F (36.8 °C) (01/18/23 0731)  Pulse: 109 (01/18/23 0731)  Resp: 18 (01/18/23 0731)  BP: 115/67 (01/18/23 0731)  SpO2: (!) 94 % (01/18/23 0731)   Vital Signs (24h Range):  Temp:  [97.8 °F (36.6 °C)-98.5 °F (36.9 °C)] 98.2 °F (36.8 °C)  Pulse:  [104-122] 109  Resp:  [18-20] 18  SpO2:  [94 %-100 %] 94 %  BP: (108-147)/(61-80) 115/67     Weight: 110.6 kg (243 lb 12.8 oz)  Body mass index is 38.18 kg/m².    Intake/Output Summary (Last 24 hours) at 1/18/2023 0930  Last data filed at 1/18/2023 0650  Gross per 24 hour   Intake 960 ml   Output 1000 ml   Net -40 ml      Physical Exam  Vitals and nursing note reviewed.   Constitutional:       General: She is not in acute distress.     Appearance: She is obese.   HENT:      Head: Normocephalic  and atraumatic.      Right Ear: External ear normal.      Left Ear: External ear normal.      Nose: Nose normal. No congestion or rhinorrhea.      Mouth/Throat:      Mouth: Mucous membranes are dry.      Pharynx: No oropharyngeal exudate.   Eyes:      General: No scleral icterus.     Extraocular Movements: Extraocular movements intact.      Conjunctiva/sclera: Conjunctivae normal.   Neck:      Vascular: No carotid bruit.      Comments: Limited ROM at baseline  Cardiovascular:      Rate and Rhythm: Normal rate and regular rhythm.      Heart sounds: Murmur heard.     No friction rub. No gallop.   Pulmonary:      Effort: No respiratory distress.      Breath sounds: No stridor. No wheezing, rhonchi or rales.   Chest:      Chest wall: No tenderness.   Abdominal:      General: There is no distension.      Palpations: Abdomen is soft. There is no mass.      Tenderness: There is no abdominal tenderness. There is no right CVA tenderness, left CVA tenderness or guarding.   Musculoskeletal:         General: No swelling, tenderness or deformity.      Cervical back: Normal range of motion and neck supple. No rigidity or tenderness.      Right lower leg: No edema.      Left lower leg: No edema.   Lymphadenopathy:      Cervical: No cervical adenopathy.   Skin:     General: Skin is dry.      Capillary Refill: Capillary refill takes less than 2 seconds.      Coloration: Skin is not jaundiced or pale.      Findings: No bruising or rash.   Neurological:      Mental Status: She is oriented to person, place, and time. Mental status is at baseline.      Cranial Nerves: No cranial nerve deficit.      Sensory: Sensory deficit present.      Motor: Weakness (unable to lift legs against gravity, also unable to plant feet with hips and knees flexed bilaterally in supine, weakness more prominent on left side, no SI joint tenderness) present.      Comments: Sensation diminished in bilateral legs   Positive babinski bilaterally.    Psychiatric:          Mood and Affect: Mood normal.         Behavior: Behavior normal.         Thought Content: Thought content normal.      Comments: Cooperative, but increasingly frustrated with fear of inability to walk      Significant Labs: All pertinent labs within the past 24 hours have been reviewed.  Blood Culture: No results for input(s): LABBLOO in the last 48 hours.  BMP:   Recent Labs   Lab 01/18/23 0526   GLU 83      K 4.0      CO2 29   BUN 20   CREATININE 0.4*   CALCIUM 8.7   MG 2.0     CBC:   Recent Labs   Lab 01/17/23 0519 01/18/23 0526   WBC 3.60* 3.10*   HGB 11.0* 9.9*   HCT 34.5* 31.6*    131*     CMP:   Recent Labs   Lab 01/17/23 0519 01/18/23 0526    139   K 3.7 4.0    108   CO2 27 29   GLU 94 83   BUN 14 20   CREATININE 0.5 0.4*   CALCIUM 8.6* 8.7   PROT 8.7* 7.9   ALBUMIN 2.3* 2.1*   BILITOT 1.7* 1.7*   ALKPHOS 159* 143*   AST 59* 51*   ALT 36 31   ANIONGAP 7* 2*     Magnesium:   Recent Labs   Lab 01/17/23 0519 01/18/23 0526   MG 1.8 2.0     Significant Imaging: I have reviewed all pertinent imaging results/findings within the past 24 hours.

## 2023-01-18 NOTE — ASSESSMENT & PLAN NOTE
Resume home meds.   - lyrica discontinued  1/12 gabapentin increased to 400 mg TID  1/16 increase PRN anxiolytic  1/18 continue with changes above

## 2023-01-18 NOTE — PLAN OF CARE
Recommendations  1. Rec'd continue current diet order: Heart Healthy.   2. Rec'd ONS: Ensure Enlive BID to provide 700kcal and 40g protein.   3. Rec'd encourage PO intake and compliance with drinking ONS.   4. RD to follow and make rec's accordingly.  Goals: 1. Pt will consume >75% EEN via PO intake by next RD follow up.  Nutrition Goal Status: goal not met

## 2023-01-19 ENCOUNTER — HOSPITAL ENCOUNTER (INPATIENT)
Facility: HOSPITAL | Age: 43
LOS: 40 days | Discharge: NURSING, REHAB OR LONG-TERM CARE | DRG: 453 | End: 2023-02-28
Attending: NEUROLOGICAL SURGERY | Admitting: NEUROLOGICAL SURGERY
Payer: MEDICAID

## 2023-01-19 VITALS
HEART RATE: 101 BPM | DIASTOLIC BLOOD PRESSURE: 63 MMHG | SYSTOLIC BLOOD PRESSURE: 133 MMHG | HEIGHT: 67 IN | RESPIRATION RATE: 20 BRPM | BODY MASS INDEX: 38.27 KG/M2 | TEMPERATURE: 98 F | OXYGEN SATURATION: 99 % | WEIGHT: 243.81 LBS

## 2023-01-19 DIAGNOSIS — R53.81 DEBILITY: ICD-10-CM

## 2023-01-19 DIAGNOSIS — A49.8 E COLI INFECTION: ICD-10-CM

## 2023-01-19 DIAGNOSIS — A41.9 SEPSIS, DUE TO UNSPECIFIED ORGANISM, UNSPECIFIED WHETHER ACUTE ORGAN DYSFUNCTION PRESENT: ICD-10-CM

## 2023-01-19 DIAGNOSIS — E87.1 HYPONATREMIA: ICD-10-CM

## 2023-01-19 DIAGNOSIS — I27.20 PULMONARY HYPERTENSION: ICD-10-CM

## 2023-01-19 DIAGNOSIS — M46.44 THORACIC DISCITIS: ICD-10-CM

## 2023-01-19 DIAGNOSIS — R00.0 TACHYCARDIA: ICD-10-CM

## 2023-01-19 DIAGNOSIS — T50.905A MEDICATION ADVERSE EFFECT: ICD-10-CM

## 2023-01-19 DIAGNOSIS — R29.898 LOWER EXTREMITY WEAKNESS: ICD-10-CM

## 2023-01-19 DIAGNOSIS — R29.898 WEAKNESS OF BOTH LOWER EXTREMITIES: ICD-10-CM

## 2023-01-19 DIAGNOSIS — R07.9 CHEST PAIN: ICD-10-CM

## 2023-01-19 DIAGNOSIS — M46.40 SPONDYLODISCITIS: ICD-10-CM

## 2023-01-19 DIAGNOSIS — M40.15 OTHER SECONDARY KYPHOSIS, THORACOLUMBAR REGION: ICD-10-CM

## 2023-01-19 DIAGNOSIS — K74.60 CHRONIC HEPATITIS C WITH CIRRHOSIS: Chronic | ICD-10-CM

## 2023-01-19 DIAGNOSIS — G89.4 CHRONIC PAIN SYNDROME: ICD-10-CM

## 2023-01-19 DIAGNOSIS — B18.2 CHRONIC HEPATITIS C WITH CIRRHOSIS: Chronic | ICD-10-CM

## 2023-01-19 DIAGNOSIS — F32.A ANXIETY AND DEPRESSION: Chronic | ICD-10-CM

## 2023-01-19 DIAGNOSIS — Z98.1 S/P SPINAL FUSION: Primary | ICD-10-CM

## 2023-01-19 DIAGNOSIS — G95.9 MYELOPATHY: ICD-10-CM

## 2023-01-19 DIAGNOSIS — F41.9 ANXIETY AND DEPRESSION: Chronic | ICD-10-CM

## 2023-01-19 DIAGNOSIS — D69.6 THROMBOCYTOPENIA: ICD-10-CM

## 2023-01-19 LAB
ALBUMIN SERPL BCP-MCNC: 2.1 G/DL (ref 3.5–5.2)
ALP SERPL-CCNC: 137 U/L (ref 55–135)
ALT SERPL W/O P-5'-P-CCNC: 32 U/L (ref 10–44)
ANION GAP SERPL CALC-SCNC: 3 MMOL/L (ref 8–16)
APTT BLDCRRT: 27.6 SEC (ref 21–32)
AST SERPL-CCNC: 51 U/L (ref 10–40)
BASOPHILS # BLD AUTO: 0.02 K/UL (ref 0–0.2)
BASOPHILS NFR BLD: 0.6 % (ref 0–1.9)
BILIRUB SERPL-MCNC: 1.4 MG/DL (ref 0.1–1)
BUN SERPL-MCNC: 22 MG/DL (ref 6–20)
CALCIUM SERPL-MCNC: 8.2 MG/DL (ref 8.7–10.5)
CHLORIDE SERPL-SCNC: 109 MMOL/L (ref 95–110)
CO2 SERPL-SCNC: 27 MMOL/L (ref 23–29)
CREAT SERPL-MCNC: 0.5 MG/DL (ref 0.5–1.4)
DIFFERENTIAL METHOD: ABNORMAL
EOSINOPHIL # BLD AUTO: 0.1 K/UL (ref 0–0.5)
EOSINOPHIL NFR BLD: 2.5 % (ref 0–8)
ERYTHROCYTE [DISTWIDTH] IN BLOOD BY AUTOMATED COUNT: 17 % (ref 11.5–14.5)
EST. GFR  (NO RACE VARIABLE): >60 ML/MIN/1.73 M^2
GLUCOSE SERPL-MCNC: 113 MG/DL (ref 70–110)
HCT VFR BLD AUTO: 30.1 % (ref 37–48.5)
HGB BLD-MCNC: 9.6 G/DL (ref 12–16)
IMM GRANULOCYTES # BLD AUTO: 0.01 K/UL (ref 0–0.04)
IMM GRANULOCYTES NFR BLD AUTO: 0.3 % (ref 0–0.5)
INR PPP: 1.3 (ref 0.8–1.2)
LYMPHOCYTES # BLD AUTO: 0.5 K/UL (ref 1–4.8)
LYMPHOCYTES NFR BLD: 14.8 % (ref 18–48)
MAGNESIUM SERPL-MCNC: 1.8 MG/DL (ref 1.6–2.6)
MCH RBC QN AUTO: 27.6 PG (ref 27–31)
MCHC RBC AUTO-ENTMCNC: 31.9 G/DL (ref 32–36)
MCV RBC AUTO: 87 FL (ref 82–98)
MONOCYTES # BLD AUTO: 0.3 K/UL (ref 0.3–1)
MONOCYTES NFR BLD: 8.3 % (ref 4–15)
NEUTROPHILS # BLD AUTO: 2.4 K/UL (ref 1.8–7.7)
NEUTROPHILS NFR BLD: 73.5 % (ref 38–73)
NRBC BLD-RTO: 0 /100 WBC
PLATELET # BLD AUTO: 117 K/UL (ref 150–450)
PMV BLD AUTO: 8.6 FL (ref 9.2–12.9)
POTASSIUM SERPL-SCNC: 3.9 MMOL/L (ref 3.5–5.1)
PROT SERPL-MCNC: 7.9 G/DL (ref 6–8.4)
PROTHROMBIN TIME: 13 SEC (ref 9–12.5)
RBC # BLD AUTO: 3.48 M/UL (ref 4–5.4)
SODIUM SERPL-SCNC: 139 MMOL/L (ref 136–145)
WBC # BLD AUTO: 3.25 K/UL (ref 3.9–12.7)

## 2023-01-19 PROCEDURE — 27000207 HC ISOLATION

## 2023-01-19 PROCEDURE — 97110 THERAPEUTIC EXERCISES: CPT

## 2023-01-19 PROCEDURE — 25000003 PHARM REV CODE 250: Performed by: PSYCHIATRY & NEUROLOGY

## 2023-01-19 PROCEDURE — 25000003 PHARM REV CODE 250: Performed by: STUDENT IN AN ORGANIZED HEALTH CARE EDUCATION/TRAINING PROGRAM

## 2023-01-19 PROCEDURE — 25000003 PHARM REV CODE 250: Performed by: INTERNAL MEDICINE

## 2023-01-19 PROCEDURE — 36415 COLL VENOUS BLD VENIPUNCTURE: CPT | Performed by: STUDENT IN AN ORGANIZED HEALTH CARE EDUCATION/TRAINING PROGRAM

## 2023-01-19 PROCEDURE — 85610 PROTHROMBIN TIME: CPT | Performed by: STUDENT IN AN ORGANIZED HEALTH CARE EDUCATION/TRAINING PROGRAM

## 2023-01-19 PROCEDURE — 86900 BLOOD TYPING SEROLOGIC ABO: CPT | Performed by: STUDENT IN AN ORGANIZED HEALTH CARE EDUCATION/TRAINING PROGRAM

## 2023-01-19 PROCEDURE — 11000001 HC ACUTE MED/SURG PRIVATE ROOM

## 2023-01-19 PROCEDURE — 83735 ASSAY OF MAGNESIUM: CPT | Performed by: INTERNAL MEDICINE

## 2023-01-19 PROCEDURE — 85025 COMPLETE CBC W/AUTO DIFF WBC: CPT | Performed by: INTERNAL MEDICINE

## 2023-01-19 PROCEDURE — 85730 THROMBOPLASTIN TIME PARTIAL: CPT | Performed by: STUDENT IN AN ORGANIZED HEALTH CARE EDUCATION/TRAINING PROGRAM

## 2023-01-19 PROCEDURE — 99233 SBSQ HOSP IP/OBS HIGH 50: CPT | Mod: ,,, | Performed by: STUDENT IN AN ORGANIZED HEALTH CARE EDUCATION/TRAINING PROGRAM

## 2023-01-19 PROCEDURE — 80053 COMPREHEN METABOLIC PANEL: CPT | Performed by: INTERNAL MEDICINE

## 2023-01-19 PROCEDURE — 99233 PR SUBSEQUENT HOSPITAL CARE,LEVL III: ICD-10-PCS | Mod: ,,, | Performed by: STUDENT IN AN ORGANIZED HEALTH CARE EDUCATION/TRAINING PROGRAM

## 2023-01-19 PROCEDURE — 97530 THERAPEUTIC ACTIVITIES: CPT

## 2023-01-19 PROCEDURE — 36415 COLL VENOUS BLD VENIPUNCTURE: CPT | Performed by: INTERNAL MEDICINE

## 2023-01-19 RX ORDER — LACTULOSE 10 G/15ML
20 SOLUTION ORAL 3 TIMES DAILY
Status: DISCONTINUED | OUTPATIENT
Start: 2023-01-19 | End: 2023-02-06

## 2023-01-19 RX ORDER — BUPROPION HYDROCHLORIDE 150 MG/1
300 TABLET ORAL DAILY
Status: DISCONTINUED | OUTPATIENT
Start: 2023-01-20 | End: 2023-02-13

## 2023-01-19 RX ORDER — IBUPROFEN 200 MG
24 TABLET ORAL
Status: DISCONTINUED | OUTPATIENT
Start: 2023-01-19 | End: 2023-02-28 | Stop reason: HOSPADM

## 2023-01-19 RX ORDER — SODIUM CHLORIDE 0.9 % (FLUSH) 0.9 %
10 SYRINGE (ML) INJECTION EVERY 12 HOURS PRN
Status: DISCONTINUED | OUTPATIENT
Start: 2023-01-19 | End: 2023-02-20

## 2023-01-19 RX ORDER — TALC
6 POWDER (GRAM) TOPICAL NIGHTLY PRN
Status: DISCONTINUED | OUTPATIENT
Start: 2023-01-19 | End: 2023-02-28 | Stop reason: HOSPADM

## 2023-01-19 RX ORDER — POTASSIUM CHLORIDE 20 MEQ/1
20 TABLET, EXTENDED RELEASE ORAL DAILY
Status: DISCONTINUED | OUTPATIENT
Start: 2023-01-20 | End: 2023-01-26

## 2023-01-19 RX ORDER — PROMETHAZINE HYDROCHLORIDE 25 MG/1
25 SUPPOSITORY RECTAL EVERY 6 HOURS PRN
Status: DISCONTINUED | OUTPATIENT
Start: 2023-01-19 | End: 2023-01-27

## 2023-01-19 RX ORDER — POLYETHYLENE GLYCOL 3350 17 G/17G
17 POWDER, FOR SOLUTION ORAL DAILY
Status: DISCONTINUED | OUTPATIENT
Start: 2023-01-20 | End: 2023-01-23

## 2023-01-19 RX ORDER — IBUPROFEN 200 MG
16 TABLET ORAL
Status: DISCONTINUED | OUTPATIENT
Start: 2023-01-19 | End: 2023-02-28 | Stop reason: HOSPADM

## 2023-01-19 RX ORDER — AMOXICILLIN 500 MG/1
1000 CAPSULE ORAL EVERY 12 HOURS
Status: DISCONTINUED | OUTPATIENT
Start: 2023-01-19 | End: 2023-01-24

## 2023-01-19 RX ORDER — GLUCAGON 1 MG
1 KIT INJECTION
Status: DISCONTINUED | OUTPATIENT
Start: 2023-01-19 | End: 2023-02-28 | Stop reason: HOSPADM

## 2023-01-19 RX ORDER — NALOXONE HCL 0.4 MG/ML
0.02 VIAL (ML) INJECTION
Status: DISCONTINUED | OUTPATIENT
Start: 2023-01-19 | End: 2023-02-28 | Stop reason: HOSPADM

## 2023-01-19 RX ORDER — ADHESIVE BANDAGE
30 BANDAGE TOPICAL DAILY PRN
Status: DISCONTINUED | OUTPATIENT
Start: 2023-01-19 | End: 2023-02-20

## 2023-01-19 RX ORDER — PANTOPRAZOLE SODIUM 40 MG/1
40 TABLET, DELAYED RELEASE ORAL DAILY
Status: DISCONTINUED | OUTPATIENT
Start: 2023-01-20 | End: 2023-02-28 | Stop reason: HOSPADM

## 2023-01-19 RX ADMIN — AMOXICILLIN 1000 MG: 500 CAPSULE ORAL at 11:01

## 2023-01-19 RX ADMIN — AMOXICILLIN 1000 MG: 250 CAPSULE ORAL at 10:01

## 2023-01-19 RX ADMIN — Medication 6 MG: at 11:01

## 2023-01-19 RX ADMIN — LORAZEPAM 1 MG: 1 TABLET ORAL at 05:01

## 2023-01-19 RX ADMIN — HYDROCODONE BITARTRATE AND ACETAMINOPHEN 1 TABLET: 10; 325 TABLET ORAL at 05:01

## 2023-01-19 RX ADMIN — POTASSIUM CHLORIDE 20 MEQ: 1500 TABLET, EXTENDED RELEASE ORAL at 10:01

## 2023-01-19 RX ADMIN — HYDROCODONE BITARTRATE AND ACETAMINOPHEN 1 TABLET: 10; 325 TABLET ORAL at 11:01

## 2023-01-19 RX ADMIN — PANTOPRAZOLE SODIUM 40 MG: 40 TABLET, DELAYED RELEASE ORAL at 10:01

## 2023-01-19 RX ADMIN — GABAPENTIN 600 MG: 300 CAPSULE ORAL at 10:01

## 2023-01-19 RX ADMIN — HYDROCODONE BITARTRATE AND ACETAMINOPHEN 1 TABLET: 10; 325 TABLET ORAL at 07:01

## 2023-01-19 RX ADMIN — LORAZEPAM 1 MG: 1 TABLET ORAL at 07:01

## 2023-01-19 RX ADMIN — BUPROPION HYDROCHLORIDE 300 MG: 150 TABLET, EXTENDED RELEASE ORAL at 10:01

## 2023-01-19 RX ADMIN — MAGNESIUM OXIDE TAB 400 MG (241.3 MG ELEMENTAL MG) 400 MG: 400 (241.3 MG) TAB at 10:01

## 2023-01-19 RX ADMIN — GABAPENTIN 600 MG: 300 CAPSULE ORAL at 05:01

## 2023-01-19 NOTE — PT/OT/SLP PROGRESS
"Physical Therapy Treatment    Patient Name:  Rachel Zazueta   MRN:  [id number]    Recommendations:     Discharge Recommendations: acute care hospital  Discharge Equipment Recommendations: wheelchair  Barriers to discharge: Decreased caregiver support and medical issues and severity of impairments.    .   Assessment:     Rachel Zazueta is a 42 y.o. female admitted with a medical diagnosis of Debility.  She presents with the following impairments/functional limitations: weakness, impaired balance, impaired endurance, pain, impaired sensation, impaired cardiopulmonary response to activity, impaired self care skills, decreased ROM, impaired joint extensibility, impaired functional mobility, impaired muscle length, decreased lower extremity function, gait instability . Patient is still waiting for a bed to be transferred to another hospital for medical management.  Patient was agreeable to sitting at edge of bed , she has multiple spasms on her back and increase anxiety but tolerated sitting at edge of bed. .    Rehab Prognosis: Poor; patient would benefit from acute skilled PT services to address these deficits and reach maximum level of function.    Recent Surgery: * No surgery found *      Plan:     During this hospitalization, patient to be seen 5 x/week to address the identified rehab impairments via therapeutic activities, therapeutic exercises, gait training, wheelchair management/training, neuromuscular re-education and progress toward the following goals:    Plan of Care Expires:  01/27/23    Subjective     Chief Complaint: "I will try to sit up."   Patient/Family Comments/goals: Get some help.   Pain/Comfort:  Pain Rating 1:  (did not quantify)  Location - Side 1: Bilateral  Location - Orientation 1: lower  Location 1: back  Pain Addressed 1: Reposition, Distraction  Pain Rating Post-Intervention 1:  (did not quantify)      Objective:     Communicated with nurse and patient  prior to session.  Patient " found supine with PureWick, central line upon PT entry to room.     General Precautions: Standard, contact, fall  Orthopedic Precautions: N/A  Braces: N/A  Respiratory Status: Room air     Functional Mobility:  Bed Mobility:     Rolling Left:  moderate assistance  Rolling Right: moderate assistance  Scooting: moderate assistance and maximal assistance  Bridging: moderate assistance  Supine to Sit: moderate assistance  Sit to Supine: moderate assistance and of 2 persons  Transfers:   unable   Gait: unable   Balance: CGA with static sitting after set up       AM-PAC 6 CLICK MOBILITY  Turning over in bed (including adjusting bedclothes, sheets and blankets)?: 2  Sitting down on and standing up from a chair with arms (e.g., wheelchair, bedside commode, etc.): 1  Moving from lying on back to sitting on the side of the bed?: 2  Moving to and from a bed to a chair (including a wheelchair)?: 1  Need to walk in hospital room?: 1  Climbing 3-5 steps with a railing?: 1  Basic Mobility Total Score: 8       Treatment & Education:  Rolling side <> side, supine <>sit, sitting at edge of bed  SUPINE Both Lower Extremity   Active Assisted  ROM Sets / Reps / Resistance / Etc.   Ankle PF and DF 10 x 1   SLR 10 x 1   Hip Abduction/Adduction 10 x 1         Patient left supine with all lines intact, call button in reach, and nurse notified..    GOALS:   Multidisciplinary Problems       Physical Therapy Goals          Problem: Physical Therapy    Goal Priority Disciplines Outcome Goal Variances Interventions   Physical Therapy Goal     PT, PT/OT Unable to Meet, Plan Revised     Description: Goals to be met by: 2023     Patient will increase functional independence with mobility by performin. Supine to sit with Min assist   2. Sit to supine with Min assist   3. Sit to stand transfer with Mod assist x 2 with RW   4. Bed to chair transfer with Mod/max assist  using Rolling Walker  6. Sitting at edge of bed x10 minutes with SBA   t                         Time Tracking:     PT Received On: 01/19/23  PT Start Time: 0820     PT Stop Time: 0900  PT Total Time (min): 40 min     Billable Minutes: Therapeutic Activity 30 and Therapeutic Exercise 10    Treatment Type: Treatment  PT/PTA: PT     PTA Visit Number: 0     01/19/2023

## 2023-01-19 NOTE — ASSESSMENT & PLAN NOTE
Counts are stable at this time.  Two days of leukocyte count dropping 2.39 to 1.76 and 1.42.   Overnight febrile event >101F, patient however feels better compared to yesterday evening.   1/11: Morning, vital signs stable, no tachycardia, BP normotensive.   - f/u blood cultures, NGTD x 1 day  - resumed empiric antibiotics, merrem after two days of elevated temperature >101F and downtrending ANC (900/ul)  1/13 drop in ANC to 900/ul, patient without symptoms, vital signs stable  1/16 stable  1/19 stable, will space out labs     Patient has a leukocytosis.  Last trend as follows-   Lab Results   Component Value Date    WBC 3.25 (L) 01/19/2023    WBC 3.10 (L) 01/18/2023    WBC 3.60 (L) 01/17/2023     Recent Labs   Lab 01/17/23  0519 01/18/23  0526 01/19/23  0540   Hemoglobin 11.0 L 9.9 L 9.6 L

## 2023-01-19 NOTE — NURSING
Plan of care reviewed with the patient. Still waiting for a bed assignment at [facility].

## 2023-01-19 NOTE — ASSESSMENT & PLAN NOTE
Labs noted, chronic stable.  AST   Date Value Ref Range Status   01/19/2023 51 (H) 10 - 40 U/L Final   01/18/2023 51 (H) 10 - 40 U/L Final   01/17/2023 59 (H) 10 - 40 U/L Final   01/16/2023 59 (H) 10 - 40 U/L Final   01/15/2023 66 (H) 10 - 40 U/L Final     ALT   Date Value Ref Range Status   01/19/2023 32 10 - 44 U/L Final   01/18/2023 31 10 - 44 U/L Final   01/17/2023 36 10 - 44 U/L Final   01/16/2023 33 10 - 44 U/L Final   01/15/2023 34 10 - 44 U/L Final        Alkaline Phosphatase   Date Value Ref Range Status   01/19/2023 137 (H) 55 - 135 U/L Final   01/18/2023 143 (H) 55 - 135 U/L Final   01/17/2023 159 (H) 55 - 135 U/L Final   01/16/2023 143 (H) 55 - 135 U/L Final   01/15/2023 149 (H) 55 - 135 U/L Final

## 2023-01-19 NOTE — PLAN OF CARE
Problem: Occupational Therapy  Goal: Occupational Therapy Goal  Description: Goals to be met by: 01/25/22 (Updated)    Patient will increase functional independence with ADLs by performing:    UE Dressing with Set-up Assistance.  LE Dressing with Moderate Assistance.  Grooming while seated with Set-up Assistance.  Toileting from bedside commode with Moderate Assistance for hygiene and clothing management.   Bathing from  shower chair/bench with Moderate Assistance.  Toilet transfer to toilet with Moderate Assistance.  Increased functional strength to 4- to 4/5 for bilateral UE's.    Outcome: Unable to Meet, Plan Revised

## 2023-01-19 NOTE — ASSESSMENT & PLAN NOTE
Patient has recurrent depression which is moderate and is currently controlled. Will Continue anti-depressant medications. We will consult psychiatry at this time. Patient does not display psychosis at this time. Continue to monitor closely and adjust plan of care as needed.  1/12 bupropion increase to 300 mg daily, gabapentin increase to 400 mg TID  1/16 anxiolytic increased to IV ativan 1mg Q6HPRN with continued daily buproprion, gabapentin, tizanidine  1/17 IV ativan PRN, gabapentin increased 600 mg TID, continue buproprion, tizanidine, add trazodone at bedtime  1/18 small improvement, continue with above changes and monitor pain control

## 2023-01-19 NOTE — ASSESSMENT & PLAN NOTE
Lower extremity weakness bilaterally now requiring aggressive OT and PT efforts to prevent further weakness and also regain strength necessary to carry out ADLs.   - From early bed bound state, patient has required multiple nursing staff in safely getting in and out of bed   - Original discharge plans with outpatient rehab will cause more harm to patient at this time and case management working to find a placement at SNF with PT and OT efforts  1/9 continue efforts with PT and OT, awaiting placement  1/10 slow progress with PT and OT, will obtain MRI brain and spine to identify any concerning changes  1/11 MRI limitations to TL spine study secondary to hardware, brain and cervical scan noting no acute findings;   1. No acute intracranial abnormality identified.  2. Nonspecific gliotic white matter signal change within bilateral cerebral hemispheres similar in extent and distribution greater than expected for patient age. This may be related to chronic small vessel ischemia.  Numerous chronic systemic illnesses can result in this appearance. Demyelinating disease is also in the differential.  Multiple prolonged hospitalization, neurosurgical intervention, chronic inflammation, substance abuse all likely contributing to current state. Outpatient follow up with specialists have been interrupted with hospitalizations and inconclusive studies. Will follow up with neurology for evaluation and optimize current therapy.    1/13: Awaiting [facility] diversion to be lifted for neurosurgery transfer  1/14: Awaiting transfer.  1/15: Patient has been accepted to [facility] will transfer for neurosurgery evaluation and imaging.  1/16: overnight report, [facility] back on diversion, awaiting transfer  1/17: diversion lift, awaiting available bed for transfer  1/18: Awaiting transfer  1/19: Remains on waitlist for above care

## 2023-01-19 NOTE — PROGRESS NOTES
"[facility] Medicine  Progress Note    Patient Name: Rachel Zazueta  MRN: [id number]  Patient Class: IP- Inpatient   Admission Date: 12/23/2022  Length of Stay: 27 days  Attending Physician: Dannielle Merino DO  Primary Care Provider: Dannielle Merino DO      Subjective:     Principal Problem:Debility  HPI:  ER HPI:  42-year-old female with a history of anemia, anxiety, recurrent cellulitis, hepatitis C, IVDU, liver cirrhosis, cholecystectomy, kidney stones, lumbar fusion, shoulder surgery, chronic back pain comes in c/o generalized weakness, fatigue, and bilateral flank pain.  She reports that this has been going on intermittently for several days and was diagnosed with a UTI recently.  No fever.  No abdominal pain or vomiting or diarrhea.     IM HPI:  Patient is well known to our service.  Patient has a history of IV drug abuse and unfortunately has relapsed.  Patient has a history of a epidural abscess requiring a large lumbar surgery with multiple rehabilitative stays and therapy.  The patient was progressing to ambulation and recently has declined.  Patient admits to starting drugs including IV drug use again.  Patient presented to the ED with the above symptoms and she has a noticeable cardiac murmur today that we are getting a stat echocardiogram.  Patient's been started on antibiotics fluid resuscitated and seems to have sepsis.  Patient's urinary studies were abnormal but not overwhelming.      Overview/Hospital Course:  12/24/22 CG: Patient remains in the ICU today. Has a lot of muscle spasms and low back pain. Echocardiogram ordered yesterday and completed; significant for pulm HTN but without vegetations.   12/25/22 CG:  Overnight she was having a lot of significant discomfort and pain.  We placed her on Precedex and this has helped significantly with her body aches, her irritability and muscle spasms."  12/26/22 FM:  Patient required Precedex for agitation and mood changes.  " Patient is calm and cooperative this morning.  Patient appears to have E coli sepsis so will taper antibiotics.  Patient's echocardiogram showed no vegetation and E coli would be low risk for metastatic infection.  Will transfer the patient to the floor once her Precedex discontinue we counseled her again today on the importance of avoiding substances and illicit drugs.  This continues to be setback for her.  12/27/22 WC:  Patient overall remains stable and is slowly improving.  Urine culture has revealed Candida albicans in addition to blood culture revealing E coli.  12/28/22 WC:  Patient resting comfortably this morning.  Pharm ID on case for treatment duration recs.    12/29/22 WC:  no acute events overnight.  Back pain at this time.   12/20/22:  no acute events continue IV ABX for esbl  12/31/22 CG: stable, continuing antibiotics.   1/1/2 CG: stable, continuing antibiotics. Has not had a bowel movement in two weeks. Will give lactulose.    01/02/2023: Patient did have a bowel movement yesterday.  Completing last day of antimicrobial therapy anticipated discharge tomorrow.  1/3/2023: Improving BM with lactulose. Lower extremity weakness requiring aggressive PT and OT efforts. Patient understands she needs to be an active participant. Encouraged inbed physical exercises without assistance with therapists. Stable Hg/HCT.   1/4/2023: Lactulose every other day for regular bowel movement. PT and OT recommendations with continue efforts to help with lower extremity weakness which likely precipitated from minimal participation from patient over the past week to remain physically active. Hg/HCT remains stable. Last blood Cx x2 (12/28) NGTD x5 days. Patient agrees moving forward lower extremity strengthening will have to take place with her participation and motivation. Will discharge with continue PT and OT efforts outpatient. She acknowledges outpatient follow up with GI/hepatologist, hematologist and neurologist  required to address all other chronic medication conditions.   1/5/2023: Overnight report from nursing that patient exhibited weakness in lower extremities and in upper body strength to support self to safely transfer from bed to commode or even bed to wheelchair. CM involved in placement to SNF. Continue with PT and OT. All other medical condition addressed during hospitalization remains stable.  1/6/2023: CM working on SNF placement. Patient expresses continue effort on her part to regain her strength, reassured her that she has been able to participate following the leads of PT and OT and so a new injury is less likely at this time. While small, she endorsed sitting up at bedside with legs dangle.    1/7/23 ND patient feeling well this morning no complaints will continue therapy services and rehab placement  1/8/23 ND pt reports some back pain will repeat x-rays to make sure everything looks okay from previous surgery.  Otherwise she is doing well  1/9/23 Repeat xray showing increased kyphosis over TL junction. Today, pain associated with massaging feet bilaterally with no worsening of numbness and tingling. No reported concerns with PT and OT efforts. Awaiting placement.  1/10/23 Overnight elevated temperature >101F, no further discomfort at this time and on morning exam denies further symptoms and worsening pain over joints and spine. PT and OT efforts moving forward, but still has significant muscle weakness proximally and over her core. White cells reduced, CRP elevated. Will obtain MRI scan brain and spine to follow up with worsening scoliosis, identify occult findings.    1/11/23 Second evening with elevated temp >101F with chills and tachycardia. Sepsis precautions taken with labs and cultures. No obvious infectious source, but empirically started merrem for neutropenic fever precautions. Patient this morning endorses good appetite and hopeful in being able to sit up longer today. Yesterday, she was able  to stay seated 10 minutes from 3 minutes, the day prior. Pancytopenia profile back to patient's baseline. Overall pain stable and no reported worsening. Lower extremity weakness with loss of flexion at knees and hips against gravity still prominent. Will appreciate neurology consult. Continue PT and OT.   1/12/23 Neurology consult for paraparesis, differential diagnoses include demyelinating disease, ischemic changes (MRI white matter changes), spinal cord lesions (pt with history of past epidural abscesses), compressive myelopathy.   - MRI brain with white matter changes that are concerning for demyelinating disease vs ischemic change  -Thoracic spine radiographs, three views, demonstrate multilevel thoracolumbar fusion with local kyphosis at T9-10, increased when compared to scoliosis series of 04/14/2022, measuring approximately 40 degree on today's exam and 30° on prior exam  Current facility with CT imagining limitations to further determine nature of spinal compression likely affecting bilateral lower extremity, hyperreflexia and reported altered sensation to feet. Pulses intact bilaterally.   Currently awaiting diversion at main [facility] to be lifted for transfer to neurosurgery service with further neurology work up.   No fever overnight. Blood cultures NGTD x 2 days. Will continue merrem for another 24 hours, then discontinue if no elevated temperature reading.   1/13/23 No fever overnight, WBC dropped again. NGTD x3 days on all blood cultures collected after febrile episodes x2, will discontinue merrem and resume suppressive amoxicillin Q12H secondary to partial hardware retention and hx of GBS. Early PM started to complain of  muscle spasms over her entire back that would release with breathing. Tizanidine started and pain control improved. Awaiting transfer to neurosurgery service for advanced imagining and neurology consult.   1/14/23 WC: patient afebrile overnight.  Blood cultures remain negative.   Tolerating transition to suppressive abx therapy with amoxicillin secondary to partial hardware retention.  Endorses ongoing weakness to bilateral lower ext.  Positive babinski bilaterally.  Transfer for advanced imaging and neuro consult.   01/15/2023:  A bed has become available at [facility] main campus.  Patient will be transferred for high-level care to include Hospital Medicine, Neurosurgery and any other potential sources she may need.  We appreciate their assistance with transfer.  1/16/23: No transfer overnight. Patient increasingly experiencing pain focal tenderness over thoracic spine. Patient increasingly agitated and anxious, tearful. Will f/u neurology and psychiatry. Awaiting transfer to care of neurosurgery at [facility].   1/17/23: No transfer overnight. Pain control with increased hydrocodone, gabapentin and adding trazodone at bedtime. Will discontinue xanax PRN to ativan PRN for increasing anxiety. Diversion has been lifted today, awaiting discharges for transfer to move forward.   1/18/23: Increased hydrocodone, added trazodone to increased gabapentin have helped ease pain. Awaiting transfer to [facility] for advanced imagining, neurosurgery and neurology consults.   1/19/23: Small improvement with above pain med changes. Efforts with PT and OT ongoing. Remains medically stable. Awaiting transfer to [facility]. Will reach out to hospitalist with bed availability status.       Interval History: Patient seen and examined.     Review of Systems   Constitutional:  Negative for activity change, appetite change, chills, diaphoresis, fatigue and fever.   HENT:  Negative for sore throat.    Eyes:  Negative for pain.   Respiratory:  Negative for cough, choking, chest tightness and shortness of breath.    Cardiovascular:  Negative for chest pain, palpitations and leg swelling.   Gastrointestinal:  Negative for abdominal distention, abdominal pain, constipation, diarrhea, nausea, rectal pain and vomiting.    Genitourinary:  Negative for dyspareunia, dysuria, flank pain, frequency, genital sores, hematuria and menstrual problem.   Musculoskeletal:  Positive for arthralgias, back pain, gait problem, myalgias and neck pain. Negative for neck stiffness.   Skin:  Negative for pallor, rash and wound.   Neurological:  Positive for weakness. Negative for dizziness, tremors, speech difficulty and headaches.   Psychiatric/Behavioral:  Positive for agitation and dysphoric mood. Negative for behavioral problems, confusion, decreased concentration, self-injury, sleep disturbance and suicidal ideas. The patient is not nervous/anxious.    Objective:     Vital Signs (Most Recent):  Temp: 97.9 °F (36.6 °C) (01/19/23 0732)  Pulse: 109 (01/19/23 0732)  Resp: 18 (01/19/23 0732)  BP: 130/64 (01/19/23 0732)  SpO2: 97 % (01/19/23 0732) Vital Signs (24h Range):  Temp:  [97.9 °F (36.6 °C)-99.8 °F (37.7 °C)] 97.9 °F (36.6 °C)  Pulse:  [107-123] 109  Resp:  [18-20] 18  SpO2:  [93 %-100 %] 97 %  BP: (105-138)/(61-70) 130/64     Weight: 110.6 kg (243 lb 12.8 oz)  Body mass index is 38.18 kg/m².    Intake/Output Summary (Last 24 hours) at 1/19/2023 1038  Last data filed at 1/19/2023 0604  Gross per 24 hour   Intake 2040 ml   Output 1200 ml   Net 840 ml      Physical Exam  Vitals and nursing note reviewed.   Constitutional:       General: She is not in acute distress.     Appearance: She is obese.   HENT:      Head: Normocephalic and atraumatic.      Right Ear: External ear normal.      Left Ear: External ear normal.      Nose: Nose normal. No congestion or rhinorrhea.      Mouth/Throat:      Mouth: Mucous membranes are dry.      Pharynx: No oropharyngeal exudate.   Eyes:      General: No scleral icterus.     Extraocular Movements: Extraocular movements intact.      Conjunctiva/sclera: Conjunctivae normal.   Neck:      Vascular: No carotid bruit.      Comments: Limited ROM at baseline  Cardiovascular:      Rate and Rhythm: Normal rate and regular  rhythm.      Heart sounds: Murmur heard.     No friction rub. No gallop.   Pulmonary:      Effort: No respiratory distress.      Breath sounds: No stridor. No wheezing, rhonchi or rales.   Chest:      Chest wall: No tenderness.   Abdominal:      General: There is no distension.      Palpations: Abdomen is soft. There is no mass.      Tenderness: There is no abdominal tenderness. There is no right CVA tenderness, left CVA tenderness or guarding.   Musculoskeletal:         General: Tenderness present. No swelling or deformity.      Cervical back: Normal range of motion and neck supple. No rigidity or tenderness.      Right lower leg: No edema.      Left lower leg: No edema.   Lymphadenopathy:      Cervical: No cervical adenopathy.   Skin:     General: Skin is dry.      Capillary Refill: Capillary refill takes less than 2 seconds.      Coloration: Skin is not jaundiced or pale.      Findings: No bruising or rash.   Neurological:      Mental Status: She is oriented to person, place, and time. Mental status is at baseline.      Cranial Nerves: No cranial nerve deficit.      Sensory: Sensory deficit present.      Motor: Weakness (unable to lift legs against gravity, also unable to plant feet with hips and knees flexed bilaterally in supine, weakness more prominent on left side, no SI joint tenderness) present.      Comments: Sensation diminished in bilateral legs   Positive babinski bilaterally.    Psychiatric:         Mood and Affect: Mood normal.         Behavior: Behavior normal.         Thought Content: Thought content normal.      Comments: Cooperative, but increasingly frustrated with fear of inability to walk      Significant Labs: All pertinent labs within the past 24 hours have been reviewed.  BMP:   Recent Labs   Lab 01/19/23  0540   *      K 3.9      CO2 27   BUN 22*   CREATININE 0.5   CALCIUM 8.2*   MG 1.8     CBC:   Recent Labs   Lab 01/18/23  0526 01/19/23  0540   WBC 3.10* 3.25*   HGB  9.9* 9.6*   HCT 31.6* 30.1*   * 117*     CMP:   Recent Labs   Lab 01/18/23  0526 01/19/23  0540    139   K 4.0 3.9    109   CO2 29 27   GLU 83 113*   BUN 20 22*   CREATININE 0.4* 0.5   CALCIUM 8.7 8.2*   PROT 7.9 7.9   ALBUMIN 2.1* 2.1*   BILITOT 1.7* 1.4*   ALKPHOS 143* 137*   AST 51* 51*   ALT 31 32   ANIONGAP 2* 3*     Magnesium:   Recent Labs   Lab 01/18/23  0526 01/19/23  0540   MG 2.0 1.8       Significant Imaging: I have reviewed all pertinent imaging results/findings within the past 24 hours.      Assessment/Plan:      * Debility  Lower extremity weakness bilaterally now requiring aggressive OT and PT efforts to prevent further weakness and also regain strength necessary to carry out ADLs.   - From early bed bound state, patient has required multiple nursing staff in safely getting in and out of bed   - Original discharge plans with outpatient rehab will cause more harm to patient at this time and case management working to find a placement at SNF with PT and OT efforts  1/9 continue efforts with PT and OT, awaiting placement  1/10 slow progress with PT and OT, will obtain MRI brain and spine to identify any concerning changes  1/11 MRI limitations to TL spine study secondary to hardware, brain and cervical scan noting no acute findings;   1. No acute intracranial abnormality identified.  2. Nonspecific gliotic white matter signal change within bilateral cerebral hemispheres similar in extent and distribution greater than expected for patient age. This may be related to chronic small vessel ischemia.  Numerous chronic systemic illnesses can result in this appearance. Demyelinating disease is also in the differential.  Multiple prolonged hospitalization, neurosurgical intervention, chronic inflammation, substance abuse all likely contributing to current state. Outpatient follow up with specialists have been interrupted with hospitalizations and inconclusive studies. Will follow up with  neurology for evaluation and optimize current therapy.    1/13: Awaiting [facility] diversion to be lifted for neurosurgery transfer  1/14: Awaiting transfer.  1/15: Patient has been accepted to [facility] will transfer for neurosurgery evaluation and imaging.  1/16: overnight report, [facility] back on diversion, awaiting transfer  1/17: diversion lift, awaiting available bed for transfer  1/18: Awaiting transfer  1/19: Remains on waitlist for above care    Anxiety and depression  Patient has recurrent depression which is moderate and is currently controlled. Will Continue anti-depressant medications. We will consult psychiatry at this time. Patient does not display psychosis at this time. Continue to monitor closely and adjust plan of care as needed.  1/12 bupropion increase to 300 mg daily, gabapentin increase to 400 mg TID  1/16 anxiolytic increased to IV ativan 1mg Q6HPRN with continued daily buproprion, gabapentin, tizanidine  1/17 IV ativan PRN, gabapentin increased 600 mg TID, continue buproprion, tizanidine, add trazodone at bedtime  1/18 small improvement, continue with above changes and monitor pain control          Pulmonary hypertension  Found incidentally on echocardiogram with evidence of elevated right sided pressures and dilated heart chambers on the right side. Unclear the exact etiology or class of Pulm HTN but given her significant substance abuse Class 1 certainly is a possibility.   - Will refer to outpatient pulmonologist  - Will consider a cariology consult while inpatient to see if they think it would be beneficial to have a heart cath done while inpatient      Severe sepsis  This patient does have evidence of infective focus  My overall impression is sepsis. Vital signs were reviewed and noted in progress note.  Antibiotics given-   Antibiotics (From admission, onward)    Start     Stop Route Frequency Ordered    01/14/23 0900  amoxicillin capsule 1,000 mg         -- Oral Every 12 hours  01/13/23 1947    01/02/23 1515  meropenem (MERREM) 1 g in sodium chloride 0.9 % 100 mL IVPB (MB+)        See Hyperspace for full Linked Orders Report.    01/03 0714 IV Every 8 hours (non-standard times) 01/02/23 0923    12/23/22 0533  vancomycin (VANCOCIN) 1,000 mg injection        Note to Pharmacy: Created by cabinet override    12/23 1744   12/23/22 0533    12/23/22 0405  piperacillin-tazobactam (ZOSYN) 4.5 gram injection        Note to Pharmacy: Created by cabinet override    12/23 1614   12/23/22 0405        Cultures were taken-   Microbiology Results (last 7 days)     Procedure Component Value Units Date/Time    Blood culture [[id number]] Collected: 01/10/23 2348    Order Status: Completed Specimen: Blood Updated: 01/16/23 0612     Blood Culture, Routine No growth after 5 days.    Blood culture [[id number]] Collected: 01/10/23 2344    Order Status: Completed Specimen: Blood Updated: 01/16/23 0612     Blood Culture, Routine No growth after 5 days.    Blood culture [[id number]] Collected: 01/10/23 0616    Order Status: Completed Specimen: Blood Updated: 01/15/23 2012     Blood Culture, Routine No growth after 5 days.    Blood culture [[id number]] Collected: 01/10/23 0621    Order Status: Completed Specimen: Blood Updated: 01/15/23 2012     Blood Culture, Routine No growth after 5 days.        Latest lactate reviewed, they are-  No results for input(s): LACTATE in the last 72 hours.    Organ dysfunction indicated by Acute kidney injury, Encephalopathy  and Acute liver injury  Source- ?    Source control Achieved by- see orders  - Merrem day 7 of 7 (completed on 1/3/23)  - Blood cx x2 (1/2/23) final report no growth    1/7 resolved  1/10 overnight elevated temperature reading, no worsening overall pain. Follow up Blood cultures and imagining.   1/11 second evening with elevated temperature >101F with symptoms, started Merrem  1/13 48 hours of defervescence, NGTD x 3 days on blood cultures (1/10 and 1/11), will  discontinue Merrem. Per discussion with Pharm ID, will resume suppressive therapy with amoxicillin 1g BID starting 1/14 1/16 Blood cultures remain negative. Continue suppressive therapy  1/18 Continue with suppressive therapy      Murmur, cardiac  No evidence of vegetations.     Mild episode of recurrent major depressive disorder  Resume home meds.   - lyrica discontinued  1/12 gabapentin increased to 400 mg TID  1/16 increase PRN anxiolytic  1/18 continue with changes above  1/19 continue with regimen below with pain control  - buproprion 300 mg daily  - gabapentin 600 mg TID  - trazodone 100 mg QHS  - lorazepam 1mg TID  - hydrocodone-acetaminophen 10/325 Q6H      Cannabis use disorder, moderate, dependence  As above.      Amphetamine use disorder, severe  Continue to  educate and support.      Spinal stenosis at L4-L5 level  Check xray of back due to pain, increase in scoliosis.   1/10 given proximal muscle weakness with slow improvement with daily PT and OT, will follow up MRI brain and spine  Limitations with facility's imagining capabilities, cannot rule out cord compression over thoracic and lumbar spine.   [facility] neurosurgery has accepted return of patient to their service for further evaluation. Currently on hospital wide diversion.     1/15: [facility] does have a bed available will transfer for higher level of care at this time.    MRI brain and c spine below.  Cannot r/u cord compression over thoracic and lumbar spine.  Continue with plan for transfer.     MRI BRAIN WITHOUT CONTRAST     CLINICAL HISTORY:  Debility, generalized weakness and pain     COMPARISON:  MRI brain 02/14/2022     TECHNIQUE:  Multiplanar multisequence MR imaging performed of the brain without contrast     FINDINGS:  No detected diffusion signal changes to suggest recent ischemia.  No ventricular or basal cistern effacement.  No midline shift or mass effect.  Major intracranial flow voids appear intact.  Paranasal  sinuses and mastoid air cells are clear.  Calvarial signal is intact.  No signal change to suggest recent or remote hemorrhage.  Prominent gliotic nonspecific white matter signal change in the subcortical and periventricular regions of bilateral cerebral hemispheres unchanged in extent and distribution compared to the prior study.  Corpus callosum, pituitary gland, and remaining midline structures are unremarkable.     Impression:     1. No acute intracranial abnormality identified.  2. Nonspecific gliotic white matter signal change within bilateral cerebral hemispheres similar in extent and distribution greater than expected for patient age.  This may be related to chronic small vessel ischemia.  Numerous chronic systemic illnesses can result in this appearance.  Demyelinating disease is also in the differential.        Electronically signed by: Deion Patel MD  Date:                                            01/10/2023  Time:                                           14:55    Narrative & Impression  EXAMINATION:  MRI CERVICAL SPINE WITHOUT CONTRAST     CLINICAL HISTORY:  Generalized weakness     COMPARISON:  No priors available     TECHNIQUE:  Multiplanar multisequence MR imaging performed of the cervical spine without contrast     FINDINGS:  Imaged portions of the spinal cord demonstrate normal size and signal.  Vertebral bodies are normal in height and alignment.  Artifact present on portions of this exam.  Degenerative related endplate signal changes at C4 through C7.  Marginal osteophytes are present at these levels.     C2-C3: Disc normal in height without herniation.  No spinal canal or foraminal narrowing detected.     C3-C4: Slight loss of disc space height without significant herniation.  No spinal canal or left foraminal narrowing.  Mild right foraminal narrowing related to uncovertebral osteophyte.     C4-C5: Loss of disc space height with broad-based disc osteophyte.  Disc osteophyte approximates the  anterior aspect of the cord right of midline.  Artifact limits evaluation.  The may be trace indentation of the cord.  AP diameter of the thecal sac measures 6 mm in this region.  Neural foramen are not well evaluated secondary to artifact.     C5-C6: Loss of disc space height with broad-based disc osteophyte resulting in mild indentation on the anterior thecal sac approximating the anterior cord without definite cord flattening.  AP diameter of the thecal sac measures just over 8 mm.  Neural foramen are not well evaluated secondary to artifact.  Prominent uncovertebral osteophytes do however appear to be present.     C6-C7: Loss of disc space height with mild broad-based disc osteophyte resulting in mild indentation on the anterior thecal sac, but no cord flattening.  AP diameter of the thecal sac measures 8.5 mm.  Artifact limits evaluation of the neural foramen, but prominent bilateral uncovertebral osteophytes appear to be present.     C7-T1: Maintained disc space height with minimal protrusion at the left lateral recess resulting in no significant spinal canal narrowing.  Limited evaluation of the neural foramen secondary to artifact.     Impression:     Suboptimal study secondary to artifact on portions of this exam.  Multilevel spondylosis is present extending from C3-C7 with individual levels detailed above.        Electronically signed by: Deion Patel MD  Date:                                            01/10/2023  Time:                                           15:01    Substance use disorder  Cessation advised. Follow up consult psychiatry.     Chronic hepatitis C with cirrhosis  Labs noted, will require follow up outpatient with GI to start treatment.       Cirrhosis of liver without ascites  Labs noted, chronic stable.  AST   Date Value Ref Range Status   01/19/2023 51 (H) 10 - 40 U/L Final   01/18/2023 51 (H) 10 - 40 U/L Final   01/17/2023 59 (H) 10 - 40 U/L Final   01/16/2023 59 (H) 10 - 40 U/L Final    01/15/2023 66 (H) 10 - 40 U/L Final     ALT   Date Value Ref Range Status   01/19/2023 32 10 - 44 U/L Final   01/18/2023 31 10 - 44 U/L Final   01/17/2023 36 10 - 44 U/L Final   01/16/2023 33 10 - 44 U/L Final   01/15/2023 34 10 - 44 U/L Final        Alkaline Phosphatase   Date Value Ref Range Status   01/19/2023 137 (H) 55 - 135 U/L Final   01/18/2023 143 (H) 55 - 135 U/L Final   01/17/2023 159 (H) 55 - 135 U/L Final   01/16/2023 143 (H) 55 - 135 U/L Final   01/15/2023 149 (H) 55 - 135 U/L Final            Pancytopenia  Counts are stable at this time.  Two days of leukocyte count dropping 2.39 to 1.76 and 1.42.   Overnight febrile event >101F, patient however feels better compared to yesterday evening.   1/11: Morning, vital signs stable, no tachycardia, BP normotensive.   - f/u blood cultures, NGTD x 1 day  - resumed empiric antibiotics, merrem after two days of elevated temperature >101F and downtrending ANC (900/ul)  1/13 drop in ANC to 900/ul, patient without symptoms, vital signs stable  1/16 stable  1/19 stable, will space out labs     Patient has a leukocytosis.  Last trend as follows-   Lab Results   Component Value Date    WBC 3.25 (L) 01/19/2023    WBC 3.10 (L) 01/18/2023    WBC 3.60 (L) 01/17/2023     Recent Labs   Lab 01/17/23  0519 01/18/23  0526 01/19/23  0540   Hemoglobin 11.0 L 9.9 L 9.6 L         VTE Risk Mitigation (From admission, onward)         Ordered     IP VTE HIGH RISK PATIENT  Once         12/23/22 0647     Place sequential compression device  Until discontinued         12/23/22 0647                Discharge Planning   SHIRA: 1/15/2023     Code Status: Full Code   Is the patient medically ready for discharge?:     Reason for patient still in hospital (select all that apply): PT / OT recommendations and Pending disposition  Discharge Plan A: Skilled Nursing Facility   Discharge Delays: None known at this time              Dannielle Merino DO  Department of Hospital Medicine   [facility] -  Med Surg

## 2023-01-19 NOTE — ASSESSMENT & PLAN NOTE
Resume home meds.   - lyrica discontinued  1/12 gabapentin increased to 400 mg TID  1/16 increase PRN anxiolytic  1/18 continue with changes above  1/19 continue with regimen below with pain control  - buproprion 300 mg daily  - gabapentin 600 mg TID  - trazodone 100 mg QHS  - lorazepam 1mg TID  - hydrocodone-acetaminophen 10/325 Q6H

## 2023-01-19 NOTE — PT/OT/SLP PROGRESS
Occupational Therapy   Treatment    Name: Rachel Zazueta  MRN: [id number]  Admitting Diagnosis:  Debility       Recommendations:     Discharge Recommendations: acute care hospital  Discharge Equipment Recommendations:  wheelchair  Barriers to discharge:  Other (Comment) (Medical and functional status)    Assessment:     Rachel Zazueta is a 42 y.o. female with a medical diagnosis of Debility.  She presents with increasing overall weakness, but improved affect and participation noted recently. Performance deficits affecting function are weakness, impaired endurance, impaired self care skills, impaired balance, decreased coordination, decreased lower extremity function, pain.     Rehab Prognosis:  Fair; patient would benefit from acute skilled OT services to address these deficits and reach maximum level of function.       Plan:     Patient to be seen 5 x/week to address the above listed problems via self-care/home management, therapeutic activities, therapeutic exercises  Plan of Care Expires: 01/20/23  Plan of Care Reviewed with: patient    Subjective     Pain/Comfort:  Pain Rating 1: 9/10  Location - Side 1: Bilateral  Location - Orientation 1: lower  Location 1: back  Pain Addressed 1: Reposition, Pre-medicate for activity, Distraction, Cessation of Activity, Nurse notified  Pain Rating Post-Intervention 1: 9/10    Objective:     Communicated with: nurse prior to session.  Patient found supine with PureWick, central line upon OT entry to room.    General Precautions: Standard, contact, fall    Orthopedic Precautions:N/A  Braces: N/A  Respiratory Status: Room air     Occupational Performance:     Bed Mobility:    Patient completed Rolling/Turning to Left with  moderate assistance  Patient completed Rolling/Turning to Right with moderate assistance  Patient completed Scooting/Bridging with maximal assistance  Patient completed Supine to Sit with moderate assistance  Patient completed Sit to Supine with moderate  assistance     Functional Mobility/Transfers:  Pt refused standing activity on this date.  Functional Mobility: Pt unable to ambulate at this time.    Treatment & Education:  Pt was cooperative and more motivated with minimal verbal encouragement while exhibiting mostly positive affect. She participated in continued bed mobility training requiring mostly mod assist secondary to increased lifting assist and stabilization of trunk with additional assist with bilateral LE's off EOB during supine to sit transition, scooting assist at bilateral hips to EOB, and lifting assist of bilateral LE's during sit to supine transition with continued cueing for technique to facilitate improved performance and reduce pain. Pt then participated in therapeutic activity addressing functional reaching, gross motor coordination, trunk control, trunk strength, static / dynamic sitting balance, and energy for task / endurance challenging her to reach in multiple planes utilizing bilateral Ue's requiring mod verbal and tactile cueing to facilitate optimal movement patterns, proper weight shifting, and sitting posture tolerating 19 min at EOB unsupported with additional encouragement.    Patient left HOB elevated with all lines intact, call button in reach, and nurse notified    GOALS:   Multidisciplinary Problems       Occupational Therapy Goals          Problem: Occupational Therapy    Goal Priority Disciplines Outcome Interventions   Occupational Therapy Goal     OT, PT/OT Unable to Meet, Plan Revised    Description: Goals to be met by: 01/25/22 (Updated)    Patient will increase functional independence with ADLs by performing:    UE Dressing with Set-up Assistance.  LE Dressing with Moderate Assistance.  Grooming while seated with Set-up Assistance.  Toileting from bedside commode with Moderate Assistance for hygiene and clothing management.   Bathing from  shower chair/bench with Moderate Assistance.  Toilet transfer to toilet with  Moderate Assistance.  Increased functional strength to 4- to 4/5 for bilateral UE's.                         Time Tracking:     OT Date of Treatment: 01/19/23  OT Start Time: 1147  OT Stop Time: 1220  OT Total Time (min): 33 min    Billable Minutes:Therapeutic Activity 33    OT/ROSS: OT     ROSS Visit Number: 1 1/19/2023

## 2023-01-20 ENCOUNTER — PATIENT MESSAGE (OUTPATIENT)
Dept: ADMINISTRATIVE | Facility: OTHER | Age: 43
End: 2023-01-20
Payer: MEDICAID

## 2023-01-20 PROBLEM — R29.898 WEAKNESS OF BOTH LOWER EXTREMITIES: Status: ACTIVE | Noted: 2023-01-20

## 2023-01-20 LAB
ABO + RH BLD: NORMAL
ALBUMIN SERPL BCP-MCNC: 2.2 G/DL (ref 3.5–5.2)
ALP SERPL-CCNC: 128 U/L (ref 55–135)
ALT SERPL W/O P-5'-P-CCNC: 23 U/L (ref 10–44)
ANA SER QL IF: NORMAL
ANION GAP SERPL CALC-SCNC: 8 MMOL/L (ref 8–16)
ASCENDING AORTA: 3.11 CM
AST SERPL-CCNC: 43 U/L (ref 10–40)
AV INDEX (PROSTH): 0.72
AV MEAN GRADIENT: 7 MMHG
AV PEAK GRADIENT: 10 MMHG
AV VALVE AREA: 2.85 CM2
AV VELOCITY RATIO: 0.78
BACTERIA #/AREA URNS AUTO: ABNORMAL /HPF
BASOPHILS # BLD AUTO: 0.02 K/UL (ref 0–0.2)
BASOPHILS NFR BLD: 0.7 % (ref 0–1.9)
BILIRUB SERPL-MCNC: 1.6 MG/DL (ref 0.1–1)
BILIRUB UR QL STRIP: NEGATIVE
BLD GP AB SCN CELLS X3 SERPL QL: NORMAL
BSA FOR ECHO PROCEDURE: 2.2 M2
BUN SERPL-MCNC: 20 MG/DL (ref 6–20)
CALCIUM SERPL-MCNC: 8.6 MG/DL (ref 8.7–10.5)
CHLORIDE SERPL-SCNC: 108 MMOL/L (ref 95–110)
CK SERPL-CCNC: 48 U/L (ref 20–180)
CLARITY UR REFRACT.AUTO: CLEAR
CO2 SERPL-SCNC: 22 MMOL/L (ref 23–29)
COLOR UR AUTO: YELLOW
CREAT SERPL-MCNC: 0.6 MG/DL (ref 0.5–1.4)
CRP SERPL-MCNC: 9.4 MG/L (ref 0–8.2)
CV ECHO LV RWT: 0.46 CM
DIFFERENTIAL METHOD: ABNORMAL
DOP CALC AO PEAK VEL: 1.58 M/S
DOP CALC AO VTI: 33.15 CM
DOP CALC LVOT AREA: 3.9 CM2
DOP CALC LVOT DIAMETER: 2.24 CM
DOP CALC LVOT PEAK VEL: 1.23 M/S
DOP CALC LVOT STROKE VOLUME: 94.61 CM3
DOP CALCLVOT PEAK VEL VTI: 24.02 CM
ECHO LV POSTERIOR WALL: 0.99 CM (ref 0.6–1.1)
EJECTION FRACTION: 58 %
EOSINOPHIL # BLD AUTO: 0.1 K/UL (ref 0–0.5)
EOSINOPHIL NFR BLD: 2.1 % (ref 0–8)
ERYTHROCYTE [DISTWIDTH] IN BLOOD BY AUTOMATED COUNT: 17.3 % (ref 11.5–14.5)
ERYTHROCYTE [SEDIMENTATION RATE] IN BLOOD BY PHOTOMETRIC METHOD: 70 MM/HR (ref 0–36)
EST. GFR  (NO RACE VARIABLE): >60 ML/MIN/1.73 M^2
FRACTIONAL SHORTENING: 23 % (ref 28–44)
GLUCOSE SERPL-MCNC: 87 MG/DL (ref 70–110)
GLUCOSE UR QL STRIP: NEGATIVE
HCT VFR BLD AUTO: 31 % (ref 37–48.5)
HGB BLD-MCNC: 9.3 G/DL (ref 12–16)
HGB UR QL STRIP: ABNORMAL
IMM GRANULOCYTES # BLD AUTO: 0.01 K/UL (ref 0–0.04)
IMM GRANULOCYTES NFR BLD AUTO: 0.4 % (ref 0–0.5)
INTERVENTRICULAR SEPTUM: 0.91 CM (ref 0.6–1.1)
KETONES UR QL STRIP: NEGATIVE
LA MAJOR: 5.34 CM
LA MINOR: 4.34 CM
LA WIDTH: 4.68 CM
LEFT ATRIUM SIZE: 3.82 CM
LEFT ATRIUM VOLUME INDEX: 34.2 ML/M2
LEFT ATRIUM VOLUME: 72.76 CM3
LEFT INTERNAL DIMENSION IN SYSTOLE: 3.35 CM (ref 2.1–4)
LEFT VENTRICLE DIASTOLIC VOLUME INDEX: 40.13 ML/M2
LEFT VENTRICLE DIASTOLIC VOLUME: 85.48 ML
LEFT VENTRICLE MASS INDEX: 63 G/M2
LEFT VENTRICLE SYSTOLIC VOLUME INDEX: 21.5 ML/M2
LEFT VENTRICLE SYSTOLIC VOLUME: 45.71 ML
LEFT VENTRICULAR INTERNAL DIMENSION IN DIASTOLE: 4.35 CM (ref 3.5–6)
LEFT VENTRICULAR MASS: 135.24 G
LEUKOCYTE ESTERASE UR QL STRIP: ABNORMAL
LYMPHOCYTES # BLD AUTO: 0.4 K/UL (ref 1–4.8)
LYMPHOCYTES NFR BLD: 15.4 % (ref 18–48)
MAGNESIUM SERPL-MCNC: 1.7 MG/DL (ref 1.6–2.6)
MCH RBC QN AUTO: 27.5 PG (ref 27–31)
MCHC RBC AUTO-ENTMCNC: 30 G/DL (ref 32–36)
MCV RBC AUTO: 92 FL (ref 82–98)
MICROSCOPIC COMMENT: ABNORMAL
MONOCYTES # BLD AUTO: 0.3 K/UL (ref 0.3–1)
MONOCYTES NFR BLD: 9.5 % (ref 4–15)
NEUTROPHILS # BLD AUTO: 2.1 K/UL (ref 1.8–7.7)
NEUTROPHILS NFR BLD: 71.9 % (ref 38–73)
NITRITE UR QL STRIP: NEGATIVE
NRBC BLD-RTO: 0 /100 WBC
PH UR STRIP: 7 [PH] (ref 5–8)
PHOSPHATE SERPL-MCNC: 3.6 MG/DL (ref 2.7–4.5)
PISA TR MAX VEL: 2.39 M/S
PLATELET # BLD AUTO: 119 K/UL (ref 150–450)
PMV BLD AUTO: 10.2 FL (ref 9.2–12.9)
POTASSIUM SERPL-SCNC: 4.2 MMOL/L (ref 3.5–5.1)
PROT SERPL-MCNC: 7.4 G/DL (ref 6–8.4)
PROT UR QL STRIP: ABNORMAL
RA MAJOR: 5.16 CM
RA PRESSURE: 3 MMHG
RA WIDTH: 3.77 CM
RBC # BLD AUTO: 3.38 M/UL (ref 4–5.4)
RBC #/AREA URNS AUTO: >100 /HPF (ref 0–4)
RIGHT VENTRICULAR END-DIASTOLIC DIMENSION: 3.3 CM
SINUS: 3.09 CM
SODIUM SERPL-SCNC: 138 MMOL/L (ref 136–145)
SP GR UR STRIP: 1.02 (ref 1–1.03)
SQUAMOUS #/AREA URNS AUTO: 6 /HPF
STJ: 2.95 CM
TDI LATERAL: 0.12 M/S
TDI SEPTAL: 0.11 M/S
TDI: 0.12 M/S
TR MAX PG: 23 MMHG
TRICUSPID ANNULAR PLANE SYSTOLIC EXCURSION: 2.27 CM
TV REST PULMONARY ARTERY PRESSURE: 26 MMHG
URN SPEC COLLECT METH UR: ABNORMAL
WBC # BLD AUTO: 2.85 K/UL (ref 3.9–12.7)
WBC #/AREA URNS AUTO: 76 /HPF (ref 0–5)
YEAST UR QL AUTO: ABNORMAL

## 2023-01-20 PROCEDURE — 99223 1ST HOSP IP/OBS HIGH 75: CPT | Mod: ,,, | Performed by: STUDENT IN AN ORGANIZED HEALTH CARE EDUCATION/TRAINING PROGRAM

## 2023-01-20 PROCEDURE — 99223 PR INITIAL HOSPITAL CARE,LEVL III: ICD-10-PCS | Mod: ,,, | Performed by: STUDENT IN AN ORGANIZED HEALTH CARE EDUCATION/TRAINING PROGRAM

## 2023-01-20 PROCEDURE — 85652 RBC SED RATE AUTOMATED: CPT | Performed by: STUDENT IN AN ORGANIZED HEALTH CARE EDUCATION/TRAINING PROGRAM

## 2023-01-20 PROCEDURE — 27000207 HC ISOLATION

## 2023-01-20 PROCEDURE — 81001 URINALYSIS AUTO W/SCOPE: CPT | Performed by: INTERNAL MEDICINE

## 2023-01-20 PROCEDURE — 11000001 HC ACUTE MED/SURG PRIVATE ROOM

## 2023-01-20 PROCEDURE — 87040 BLOOD CULTURE FOR BACTERIA: CPT | Mod: 59

## 2023-01-20 PROCEDURE — A9585 GADOBUTROL INJECTION: HCPCS | Performed by: STUDENT IN AN ORGANIZED HEALTH CARE EDUCATION/TRAINING PROGRAM

## 2023-01-20 PROCEDURE — 63600175 PHARM REV CODE 636 W HCPCS: Performed by: STUDENT IN AN ORGANIZED HEALTH CARE EDUCATION/TRAINING PROGRAM

## 2023-01-20 PROCEDURE — 82550 ASSAY OF CK (CPK): CPT | Performed by: STUDENT IN AN ORGANIZED HEALTH CARE EDUCATION/TRAINING PROGRAM

## 2023-01-20 PROCEDURE — 80053 COMPREHEN METABOLIC PANEL: CPT | Performed by: STUDENT IN AN ORGANIZED HEALTH CARE EDUCATION/TRAINING PROGRAM

## 2023-01-20 PROCEDURE — 63600175 PHARM REV CODE 636 W HCPCS

## 2023-01-20 PROCEDURE — 25000003 PHARM REV CODE 250: Performed by: STUDENT IN AN ORGANIZED HEALTH CARE EDUCATION/TRAINING PROGRAM

## 2023-01-20 PROCEDURE — 36415 COLL VENOUS BLD VENIPUNCTURE: CPT

## 2023-01-20 PROCEDURE — 83735 ASSAY OF MAGNESIUM: CPT | Performed by: STUDENT IN AN ORGANIZED HEALTH CARE EDUCATION/TRAINING PROGRAM

## 2023-01-20 PROCEDURE — 86038 ANTINUCLEAR ANTIBODIES: CPT | Performed by: STUDENT IN AN ORGANIZED HEALTH CARE EDUCATION/TRAINING PROGRAM

## 2023-01-20 PROCEDURE — 86140 C-REACTIVE PROTEIN: CPT | Performed by: STUDENT IN AN ORGANIZED HEALTH CARE EDUCATION/TRAINING PROGRAM

## 2023-01-20 PROCEDURE — 25000003 PHARM REV CODE 250: Performed by: INTERNAL MEDICINE

## 2023-01-20 PROCEDURE — 84100 ASSAY OF PHOSPHORUS: CPT | Performed by: STUDENT IN AN ORGANIZED HEALTH CARE EDUCATION/TRAINING PROGRAM

## 2023-01-20 PROCEDURE — 25500020 PHARM REV CODE 255: Performed by: STUDENT IN AN ORGANIZED HEALTH CARE EDUCATION/TRAINING PROGRAM

## 2023-01-20 PROCEDURE — 85025 COMPLETE CBC W/AUTO DIFF WBC: CPT | Performed by: STUDENT IN AN ORGANIZED HEALTH CARE EDUCATION/TRAINING PROGRAM

## 2023-01-20 PROCEDURE — 87086 URINE CULTURE/COLONY COUNT: CPT | Performed by: INTERNAL MEDICINE

## 2023-01-20 RX ORDER — HYDROCODONE BITARTRATE AND ACETAMINOPHEN 5; 325 MG/1; MG/1
1 TABLET ORAL EVERY 6 HOURS PRN
Status: DISCONTINUED | OUTPATIENT
Start: 2023-01-20 | End: 2023-01-21

## 2023-01-20 RX ORDER — ACETAMINOPHEN 325 MG/1
650 TABLET ORAL EVERY 6 HOURS PRN
Status: DISCONTINUED | OUTPATIENT
Start: 2023-01-20 | End: 2023-01-22

## 2023-01-20 RX ORDER — LORAZEPAM 0.5 MG/1
0.5 TABLET ORAL EVERY 6 HOURS PRN
Status: DISCONTINUED | OUTPATIENT
Start: 2023-01-20 | End: 2023-01-21

## 2023-01-20 RX ORDER — GABAPENTIN 400 MG/1
400 CAPSULE ORAL 3 TIMES DAILY
Status: DISCONTINUED | OUTPATIENT
Start: 2023-01-20 | End: 2023-01-24

## 2023-01-20 RX ORDER — GADOBUTROL 604.72 MG/ML
10 INJECTION INTRAVENOUS
Status: COMPLETED | OUTPATIENT
Start: 2023-01-20 | End: 2023-01-20

## 2023-01-20 RX ORDER — METHOCARBAMOL 500 MG/1
500 TABLET, FILM COATED ORAL EVERY 8 HOURS PRN
Status: DISCONTINUED | OUTPATIENT
Start: 2023-01-20 | End: 2023-01-22

## 2023-01-20 RX ORDER — SODIUM CHLORIDE, SODIUM LACTATE, POTASSIUM CHLORIDE, CALCIUM CHLORIDE 600; 310; 30; 20 MG/100ML; MG/100ML; MG/100ML; MG/100ML
INJECTION, SOLUTION INTRAVENOUS CONTINUOUS
Status: DISCONTINUED | OUTPATIENT
Start: 2023-01-20 | End: 2023-01-21

## 2023-01-20 RX ORDER — LORAZEPAM 2 MG/ML
1 INJECTION INTRAMUSCULAR ONCE
Status: COMPLETED | OUTPATIENT
Start: 2023-01-20 | End: 2023-01-20

## 2023-01-20 RX ADMIN — PANTOPRAZOLE SODIUM 40 MG: 40 TABLET, DELAYED RELEASE ORAL at 08:01

## 2023-01-20 RX ADMIN — LORAZEPAM 1 MG: 2 INJECTION INTRAMUSCULAR; INTRAVENOUS at 02:01

## 2023-01-20 RX ADMIN — HYDROCODONE BITARTRATE AND ACETAMINOPHEN 1 TABLET: 5; 325 TABLET ORAL at 06:01

## 2023-01-20 RX ADMIN — GADOBUTROL 10 ML: 604.72 INJECTION INTRAVENOUS at 04:01

## 2023-01-20 RX ADMIN — GABAPENTIN 400 MG: 400 CAPSULE ORAL at 08:01

## 2023-01-20 RX ADMIN — GABAPENTIN 400 MG: 400 CAPSULE ORAL at 04:01

## 2023-01-20 RX ADMIN — HYDROCODONE BITARTRATE AND ACETAMINOPHEN 1 TABLET: 5; 325 TABLET ORAL at 02:01

## 2023-01-20 RX ADMIN — AMOXICILLIN 1000 MG: 500 CAPSULE ORAL at 09:01

## 2023-01-20 RX ADMIN — LORAZEPAM 0.5 MG: 0.5 TABLET ORAL at 02:01

## 2023-01-20 RX ADMIN — LACTULOSE 20 G: 20 SOLUTION ORAL at 04:01

## 2023-01-20 RX ADMIN — ACETAMINOPHEN 650 MG: 325 TABLET ORAL at 08:01

## 2023-01-20 RX ADMIN — SODIUM CHLORIDE, SODIUM LACTATE, POTASSIUM CHLORIDE, AND CALCIUM CHLORIDE: 600; 310; 30; 20 INJECTION, SOLUTION INTRAVENOUS at 01:01

## 2023-01-20 RX ADMIN — POTASSIUM CHLORIDE 20 MEQ: 1500 TABLET, EXTENDED RELEASE ORAL at 08:01

## 2023-01-20 RX ADMIN — BUPROPION HYDROCHLORIDE 300 MG: 300 TABLET, FILM COATED, EXTENDED RELEASE ORAL at 08:01

## 2023-01-20 RX ADMIN — AMOXICILLIN 1000 MG: 500 CAPSULE ORAL at 08:01

## 2023-01-20 RX ADMIN — LORAZEPAM 0.5 MG: 0.5 TABLET ORAL at 08:01

## 2023-01-20 RX ADMIN — LACTULOSE 20 G: 20 SOLUTION ORAL at 08:01

## 2023-01-20 RX ADMIN — HYDROCODONE BITARTRATE AND ACETAMINOPHEN 1 TABLET: 5; 325 TABLET ORAL at 10:01

## 2023-01-20 NOTE — ASSESSMENT & PLAN NOTE
-Chronic. Hx of Hepatitis C. Transaminitis present as far back as 2015 on labs.  -RUQ U/S 02/2022: Hepatic cirrhosis and portal hypertension with satisfactory Doppler evaluation of the liver.-  -Follows with GI outpatient with most recent visit in 05/2022 where she refused transplant consideration.  -Patient reported shortness of breath exacerbated by sitting up, concerning for platypnea.     MELD-Na score: 11 at 1/19/2023 10:53 PM  MELD score: 11 at 1/19/2023 10:53 PM  Calculated from:  Serum Creatinine: 0.5 mg/dL (Using min of 1 mg/dL) at 1/19/2023  5:40 AM  Serum Sodium: 139 mmol/L (Using max of 137 mmol/L) at 1/19/2023  5:40 AM  Total Bilirubin: 1.4 mg/dL at 1/19/2023  5:40 AM  INR(ratio): 1.3 at 1/19/2023 10:53 PM  Age: 42 years      PLAN  -Trend transaminitis on CMP  -Continue lactulose  -Follow-up right upper quadrant ultrasound.   -Screen for platypnea. Hepatology consult if concerns for hepatopulmonary syndrome  -Patient overdue for GI follow-up for hep C treatment.  on outpatient followup at discharge

## 2023-01-20 NOTE — PLAN OF CARE
Problem: Adult Inpatient Plan of Care  Goal: Plan of Care Review  Outcome: Ongoing, Progressing  Goal: Absence of Hospital-Acquired Illness or Injury  Outcome: Ongoing, Progressing  Goal: Optimal Comfort and Wellbeing  Outcome: Ongoing, Progressing     Problem: Adjustment to Illness (Sepsis/Septic Shock)  Goal: Optimal Coping  Outcome: Ongoing, Progressing     Problem: Infection Progression (Sepsis/Septic Shock)  Goal: Absence of Infection Signs and Symptoms  Outcome: Ongoing, Progressing     Problem: Skin Injury Risk Increased  Goal: Skin Health and Integrity  Outcome: Ongoing, Progressing

## 2023-01-20 NOTE — NURSING
Patient identified by 2 identifiers.   Allergies reviewed.  IV in place to LESLIE .  Bubble study explained to patient, patient verbalized understanding.  Bubble performed with Valsalva.  Pt tolerated procedure well.

## 2023-01-20 NOTE — PLAN OF CARE
Plan of care reviewed with the patient. Patient accepted at [facility] room 923.     Report called to assigned nurse at [facility]. Patient left with [facility] and all of her belongings and a family member walked downstairs with them.

## 2023-01-20 NOTE — SUBJECTIVE & OBJECTIVE
Medications Prior to Admission   Medication Sig Dispense Refill Last Dose    albuterol (ACCUNEB) 1.25 mg/3 mL Nebu Take 3 mLs (1.25 mg total) by nebulization every 6 (six) hours as needed (shortness of breath). Rescue 75 mL 0     buPROPion (WELLBUTRIN) 100 MG tablet Take 1 tablet (100 mg total) by mouth 2 (two) times daily. 60 tablet 2     folic acid (FOLVITE) 1 MG tablet Take 1 tablet (1 mg total) by mouth once daily. (Patient not taking: Reported on 11/25/2022) 42 tablet 0     gabapentin (NEURONTIN) 300 MG capsule Take 1 capsule (300 mg total) by mouth 3 (three) times daily. 90 capsule 11     lactulose (CHRONULAC) 20 gram/30 mL Soln Take 30 mLs (20 g total) by mouth 3 (three) times daily. 2700 mL 2     pantoprazole (PROTONIX) 40 MG tablet Take 1 tablet (40 mg total) by mouth once daily. 30 tablet 1        Review of patient's allergies indicates:   Allergen Reactions    Bee venom protein (honey bee) Anaphylaxis     Patient reports she is allergic to bee stings.    Naproxen Anaphylaxis     Throat closing    12-23- Patient reports taking Ibuprofen 200 mg at home without problems. Verified X3. [place]    Wasp sting [allergen ext-venom-honey bee] Anaphylaxis    Adhesive Blisters    Iodine and iodide containing products Hives     Allergic to iodine in seafood only    Shellfish containing products     Nuts [tree nut] Rash       Past Medical History:   Diagnosis Date    Anemia     Anxiety     Depressed     Diskitis     Hep C w/o coma, chronic     IV drug abuse     Kidney stone     Liver cirrhosis      Past Surgical History:   Procedure Laterality Date    ARTHROTOMY OF SHOULDER Left 11/15/2022    Procedure: ARTHROTOMY, SHOULDER;  Surgeon: Bjorn Hayes MD;  Location: [facility];  Service: Orthopedics;  Laterality: Left;  Left acromioclavicular joint arthrotomy    BACK SURGERY      benine tumor removal      forehead, age 9    CHOLECYSTECTOMY      KIDNEY SURGERY      LUMBAR FUSION Bilateral 3/2/2022    Procedure: FUSION, SPINE,  LUMBAR;  Surgeon: Guille Templeton MD;  Location: [facility] OR 2ND FLR;  Service: Neurosurgery;  Laterality: Bilateral;  AIRO  T10--Pelvis    REMOVAL OF HARDWARE FROM SPINE N/A 2/21/2022    Procedure: REMOVAL, HARDWARE, SPINE;  Surgeon: Guille Templeton MD;  Location: [facility] OR 2ND FLR;  Service: Neurosurgery;  Laterality: N/A;  [place]    SPINE SURGERY       Family History       Problem Relation (Age of Onset)    Cirrhosis Father    Drug abuse Mother, Father    Hepatitis Mother, Father    Liver cancer Mother          Tobacco Use    Smoking status: Some Days     Packs/day: 0.50     Years: 23.00     Pack years: 11.50     Types: Cigarettes    Smokeless tobacco: Never    Tobacco comments:     patient states she knows she nees to quit.   Substance and Sexual Activity    Alcohol use: Not Currently     Comment: quit 2014    Drug use: Yes     Frequency: 4.0 times per week     Types: Marijuana     Comment: Patient denies needle use, only inhalation of methampehtamines or orally.  Last known drug use was prior to admission to hospital stay for surgery    Sexual activity: Yes     Partners: Male     Birth control/protection: None     Review of Systems   Constitutional:  Negative for fever.   Eyes:  Negative for visual disturbance.   Respiratory:  Negative for shortness of breath.    Cardiovascular:  Negative for chest pain.   Gastrointestinal:  Negative for nausea and vomiting.   Musculoskeletal:  Positive for back pain. Negative for neck pain.   Neurological:  Positive for weakness. Negative for dizziness, seizures, light-headedness, numbness and headaches.   Objective:     Weight: 102.5 kg (225 lb 15.5 oz)  Body mass index is 35.39 kg/m².  Vital Signs (Most Recent):  Temp: 98 °F (36.7 °C) (01/20/23 0618)  Pulse: 109 (01/20/23 0618)  Resp: 18 (01/20/23 0618)  BP: (!) 102/55 (01/20/23 0618)  SpO2: 95 % (01/20/23 0618)   Vital Signs (24h Range):  Temp:  [97.8 °F (36.6 °C)-98.5 °F (36.9 °C)] 98 °F (36.7 °C)  Pulse:  [101-109] 109  Resp:   [16-20] 18  SpO2:  [94 %-99 %] 95 %  BP: (102-133)/(55-66) 102/55                          Physical Exam    Neurosurgery Physical Exam  General: well developed, well nourished, no distress.   Head: normocephalic, atraumatic  Neurologic: Alert and oriented. Thought content appropriate.  GCS: Motor: 6/Verbal: 5/Eyes: 4 GCS Total: 15  Mental Status: Awake, Alert, Oriented x 4  Language: No aphasia  Speech: No dysarthria  Cranial nerves: face symmetric, tongue midline, CN II-XII grossly intact.   Eyes: pupils equal, round, reactive to light with accommodation, EOMI.   Pulmonary: normal respirations, no signs of respiratory distress  Abdomen: soft, non-distended, not tender to palpation  Skin: Skin is warm, dry and intact.  Sensory: intact to light touch throughout    Motor Strength:Moves all extremities spontaneously with good tone.  Full strength upper and lower extremities. No abnormal movements seen.     Strength  Deltoids Triceps Biceps Wrist Extension Wrist Flexion Hand    Upper: R 5/5 5/5 5/5 5/5 5/5 5/5    L 5/5 5/5 5/5 5/5 5/5 5/5     Iliopsoas Quadriceps Knee  Flexion Tibialis  anterior Gastro- cnemius EHL   Lower: R 3/5 3/5 2/5 2/5 2/5 2/5    L 3/5 3/5 2/5 2/5 2/5 2/5     Reflexes:   DTR: 2+ symmetrically throughout.  Parker's: Negative.  Babinski's: Negative.  Clonus: 2 beats bilateral lower extremity    Incision well healed      Significant Labs:  Recent Labs   Lab 01/19/23  0540   *      K 3.9      CO2 27   BUN 22*   CREATININE 0.5   CALCIUM 8.2*   MG 1.8     Recent Labs   Lab 01/19/23  0540   WBC 3.25*   HGB 9.6*   HCT 30.1*   *     Recent Labs   Lab 01/19/23  2253   INR 1.3*   APTT 27.6     Microbiology Results (last 7 days)       Procedure Component Value Units Date/Time    Urine culture [[id number]] Collected: 01/20/23 0130    Order Status: No result Specimen: Urine Updated: 01/20/23 0353    Culture, Respiratory with Gram Stain [[id number]]     Order Status: Canceled  Specimen: Respiratory           All pertinent labs from the last 24 hours have been reviewed.    Significant Diagnostics:  I have reviewed all pertinent imaging results/findings within the past 24 hours.  I have reviewed and interpreted all pertinent imaging results/findings within the past 24 hours.

## 2023-01-20 NOTE — ASSESSMENT & PLAN NOTE
Recently evaluated by psychiatry at OSH     PLAN  -Continue Lorazepam PRN anxiety  -Per recent psych recs,  --Depression- Bupropion 300 mg XR qd  --Anxiety-Gabapentin off-label 400 mg TID.

## 2023-01-20 NOTE — ASSESSMENT & PLAN NOTE
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 Transferred to [facility] to  and neurosurgery was consulted    Plan:  Admitted to  floor status  -- FU MRI T/L spine w w/o contrast  -- FU CT T/L spine thin cut  -- Continue ID recs at this time  -- Full preop labs and NPO at this time until imaging complete and plan discussed  Further recs to follow imaging completion. Please contact team with any further questions.

## 2023-01-20 NOTE — PLAN OF CARE
Problem: Physical Therapy  Goal: Physical Therapy Goal  Description: Goals to be met by: 2023     Patient will increase functional independence with mobility by performin. Supine to sit with Min assist   2. Sit to supine with Min assist   3. Sit to stand transfer with Mod assist x 2 with RW   4. Bed to chair transfer with Mod/max assist  using Rolling Walker  6. Sitting at edge of bed x10 minutes with SBA  t    Outcome: Adequate for Care Transition

## 2023-01-20 NOTE — SUBJECTIVE & OBJECTIVE
Past Medical History:   Diagnosis Date    Anemia     Anxiety     Depressed     Diskitis     Hep C w/o coma, chronic     IV drug abuse     Kidney stone     Liver cirrhosis        Past Surgical History:   Procedure Laterality Date    ARTHROTOMY OF SHOULDER Left 11/15/2022    Procedure: ARTHROTOMY, SHOULDER;  Surgeon: Bjorn Hayes MD;  Location: [facility];  Service: Orthopedics;  Laterality: Left;  Left acromioclavicular joint arthrotomy    BACK SURGERY      benine tumor removal      forehead, age 9    CHOLECYSTECTOMY      KIDNEY SURGERY      LUMBAR FUSION Bilateral 3/2/2022    Procedure: FUSION, SPINE, LUMBAR;  Surgeon: Guille Templeton MD;  Location: [address];  Service: Neurosurgery;  Laterality: Bilateral;  AIRO  T10--Pelvis    REMOVAL OF HARDWARE FROM SPINE N/A 2/21/2022    Procedure: REMOVAL, HARDWARE, SPINE;  Surgeon: Guille Templeton MD;  Location: [address];  Service: Neurosurgery;  Laterality: N/A;  [place]    SPINE SURGERY         Review of patient's allergies indicates:   Allergen Reactions    Bee venom protein (honey bee) Anaphylaxis     Patient reports she is allergic to bee stings.    Naproxen Anaphylaxis     Throat closing    12-23- Patient reports taking Ibuprofen 200 mg at home without problems. Verified X3. [place]    Wasp sting [allergen ext-venom-honey bee] Anaphylaxis    Adhesive Blisters    Iodine and iodide containing products Hives     Allergic to iodine in seafood only    Shellfish containing products     Nuts [tree nut] Rash       Current Facility-Administered Medications on File Prior to Encounter   Medication    [DISCONTINUED] amoxicillin capsule 1,000 mg    [DISCONTINUED] buPROPion TB24 tablet 300 mg    [DISCONTINUED] gabapentin capsule 600 mg    [DISCONTINUED] gadobutroL (GADAVIST) injection 10 mL    [DISCONTINUED] HYDROcodone-acetaminophen  mg per tablet 1 tablet    [DISCONTINUED] ibuprofen tablet 400 mg    [DISCONTINUED] lactulose 20 gram/30 mL solution Soln 20 g     [DISCONTINUED] LORazepam tablet 1 mg    [DISCONTINUED] magnesium hydroxide 400 mg/5 ml suspension 2,400 mg    [DISCONTINUED] magnesium oxide tablet 400 mg    [DISCONTINUED] melatonin tablet 6 mg    [DISCONTINUED] pantoprazole EC tablet 40 mg    [DISCONTINUED] polyethylene glycol packet 17 g    [DISCONTINUED] potassium chloride SA CR tablet 20 mEq    [DISCONTINUED] promethazine suppository 25 mg    [DISCONTINUED] traZODone tablet 100 mg     Current Outpatient Medications on File Prior to Encounter   Medication Sig    albuterol (ACCUNEB) 1.25 mg/3 mL Nebu Take 3 mLs (1.25 mg total) by nebulization every 6 (six) hours as needed (shortness of breath). Rescue    buPROPion (WELLBUTRIN) 100 MG tablet Take 1 tablet (100 mg total) by mouth 2 (two) times daily.    folic acid (FOLVITE) 1 MG tablet Take 1 tablet (1 mg total) by mouth once daily. (Patient not taking: Reported on 11/25/2022)    gabapentin (NEURONTIN) 300 MG capsule Take 1 capsule (300 mg total) by mouth 3 (three) times daily.    lactulose (CHRONULAC) 20 gram/30 mL Soln Take 30 mLs (20 g total) by mouth 3 (three) times daily.    pantoprazole (PROTONIX) 40 MG tablet Take 1 tablet (40 mg total) by mouth once daily.    [DISCONTINUED] diclofenac sodium (VOLTAREN) 1 % Gel Apply 2 g topically 4 (four) times daily.     Family History       Problem Relation (Age of Onset)    Cirrhosis Father    Drug abuse Mother, Father    Hepatitis Mother, Father    Liver cancer Mother          Tobacco Use    Smoking status: Some Days     Packs/day: 0.50     Years: 23.00     Pack years: 11.50     Types: Cigarettes    Smokeless tobacco: Never    Tobacco comments:     patient states she knows she nees to quit.   Substance and Sexual Activity    Alcohol use: Not Currently     Comment: quit 2014    Drug use: Yes     Frequency: 4.0 times per week     Types: Marijuana     Comment: Patient denies needle use, only inhalation of methampehtamines or orally.  Last known drug use was prior to  admission to hospital stay for surgery    Sexual activity: Yes     Partners: Male     Birth control/protection: None     Review of Systems   Constitutional:  Positive for activity change, chills, fatigue and fever.   HENT:  Negative for sore throat and trouble swallowing.    Eyes:  Positive for visual disturbance (Chronic secondary to left eye nystagmus). Negative for discharge.   Respiratory:  Positive for shortness of breath (Exacerbated by sitting up). Negative for cough.    Cardiovascular:  Positive for leg swelling. Negative for chest pain.   Gastrointestinal:  Positive for diarrhea. Negative for abdominal pain, constipation, nausea and vomiting.   Genitourinary:  Negative for difficulty urinating and dysuria.   Musculoskeletal:  Positive for arthralgias and back pain.   Neurological:  Positive for tremors (Bilateral upper extremity), weakness (Bilateral lower extremity) and numbness (Bilateral lower extremity). Negative for dizziness and headaches.   Psychiatric/Behavioral:  Negative for suicidal ideas.         Denies any suicidal or homicidal ideations   Objective:     Vital Signs (Most Recent):  Temp: 98.5 °F (36.9 °C) (01/20/23 0007)  Pulse: 108 (01/20/23 0007)  Resp: 16 (01/20/23 0216)  BP: 115/66 (01/20/23 0007)  SpO2: (!) 94 % (01/20/23 0007)   Vital Signs (24h Range):  Temp:  [97.8 °F (36.6 °C)-99.8 °F (37.7 °C)] 98.5 °F (36.9 °C)  Pulse:  [101-123] 108  Resp:  [16-20] 16  SpO2:  [93 %-99 %] 94 %  BP: (105-133)/(63-66) 115/66     Weight: 102.5 kg (225 lb 15.5 oz)  Body mass index is 35.39 kg/m².    Physical Exam  Vitals and nursing note reviewed.   Constitutional:       General: She is not in acute distress.     Appearance: She is obese. She is not ill-appearing, toxic-appearing or diaphoretic.   HENT:      Head: Normocephalic.      Mouth/Throat:      Mouth: Mucous membranes are moist.   Eyes:      General: No scleral icterus.        Right eye: No discharge.         Left eye: No discharge.       Extraocular Movements:      Left eye: Abnormal extraocular motion (strabismus) present.   Cardiovascular:      Rate and Rhythm: Normal rate and regular rhythm.      Heart sounds: Murmur heard.   Pulmonary:      Effort: Pulmonary effort is normal. No respiratory distress.      Breath sounds: Normal breath sounds.   Abdominal:      General: Bowel sounds are normal.      Palpations: Abdomen is soft.      Tenderness: There is no abdominal tenderness.   Musculoskeletal:      Cervical back: No rigidity.      Right lower leg: Edema (Mild) present.      Left lower leg: Edema (Mild) present.   Skin:     General: Skin is warm and dry.      Coloration: Skin is not jaundiced.   Neurological:      Mental Status: She is alert and oriented to person, place, and time. Mental status is at baseline.      Sensory: Sensory deficit (Decreased in bilateral LE) present.      Motor: Weakness (Bilateral LE) and tremor (Bilateral UE) present.      Deep Tendon Reflexes: Reflexes abnormal. Babinski sign present on the right side. Babinski sign present on the left side.      Comments: Hyperreflexia in bilateral lower extremities   Psychiatric:         Mood and Affect: Mood normal.         Behavior: Behavior normal.           Significant Labs: All pertinent labs within the past 24 hours have been reviewed.  CBC:   Recent Labs   Lab 01/18/23  0526 01/19/23  0540   WBC 3.10* 3.25*   HGB 9.9* 9.6*   HCT 31.6* 30.1*   * 117*     CMP:   Recent Labs   Lab 01/18/23  0526 01/19/23  0540    139   K 4.0 3.9    109   CO2 29 27   GLU 83 113*   BUN 20 22*   CREATININE 0.4* 0.5   CALCIUM 8.7 8.2*   PROT 7.9 7.9   ALBUMIN 2.1* 2.1*   BILITOT 1.7* 1.4*   ALKPHOS 143* 137*   AST 51* 51*   ALT 31 32   ANIONGAP 2* 3*       Significant Imaging: I have reviewed all pertinent imaging results/findings within the past 24 hours.

## 2023-01-20 NOTE — H&P
[facility] - Neurosurgery ([facility]  History & Physical    Patient Name: Rachel Zazueta  MRN: [id number]  Patient Class: IP- Inpatient  Admission Date: 1/19/2023  Attending Physician: Oliver Méndez MD   Primary Care Provider: Dannielle Merino DO         Patient information was obtained from patient, past medical records and ER records.     Subjective:     Principal Problem:Weakness of both lower extremities    Chief Complaint:   Chief Complaint   Patient presents with    lower extremity weakness        HPI: Ms. Zazueta is a 42 y.o F w/ hx ofIV drug use (methamphetamine), chronic HCV with cirrhosis, spinal fusion (04/2021), epidural abscess with removal of hardware (02/2022), spinal fusion with hardware (T10 - Pelvis / 03/2022), obesity (BMI 39.3) and neurogenic bladder who initially presented to [facility] for evaluation of back pain and left leg weakness.  She reports initially noting the left leg weakness when she was about to go to the bathroom and was about to fall but was assisted by her boyfriend.  She was brought to the ED via EMS.  Urinalysis with UTI concerns and blood cultures at OSH grew ESBL E coli so she was treated with meropenem.  During hospitalization, she reports the weakness that started out in her left leg initially then spread upwards to her left thigh, left hip, then crossed over to the right hip and spread to her right leg.  Denies recent IV drug use. She reports her last incidence of drug use was more than 3 years ago and also denies tobacco, and alcohol abuse.  Reports cannabis use.    She also reports more than 10 years ago she had an episode of a headache that lasted about 4 days and was associated with residual defects of a strabismus in the left eye with associated visual disturbances.  She also reports over the past few years her friends have noticed that she sometimes has word-finding difficulty during conversations.     Reports shortness of breath when  sitting up, fever, chills, back pain, lower extremity weakness(initally L>R, but now R>L), diarrhea.  Denies cough, nausea, vomiting, constipation, bladder or bowel incontinence, dysuria, dark urine, new vision changes.    OSH course notable for concerning urinalysis and blood cultures positive for ESBL E coli treated with meropenem.  She was also admit to the ICU where she was treated with Precedex for significant body aches, irritability, and muscle spasms.  Concerns of a murmur on physical exam so she underwent TTE which did not show any vegetations.  Worsening lower extremity weakness workup with MRI head nonspecific, MRI cervical spine with multilevel spondylosis, X-ray spine with multilevel thoracolumbar fusion and diskectomy, focal kyphosis at T9-10 increased compared to prior.  She was started on prophylaxis amoxicillin due to her history of infected hardware.  MRI spine unable to be completed due to patient's body habitus so she was transferred to [facility] for further imaging and Neurosurgery, Neurology evaluation.      Past Medical History:   Diagnosis Date    Anemia     Anxiety     Depressed     Diskitis     Hep C w/o coma, chronic     IV drug abuse     Kidney stone     Liver cirrhosis        Past Surgical History:   Procedure Laterality Date    ARTHROTOMY OF SHOULDER Left 11/15/2022    Procedure: ARTHROTOMY, SHOULDER;  Surgeon: Bjorn Hayes MD;  Location: [facility];  Service: Orthopedics;  Laterality: Left;  Left acromioclavicular joint arthrotomy    BACK SURGERY      benine tumor removal      forehead, age 9    CHOLECYSTECTOMY      KIDNEY SURGERY      LUMBAR FUSION Bilateral 3/2/2022    Procedure: FUSION, SPINE, LUMBAR;  Surgeon: Guille Templeton MD;  Location: [address];  Service: Neurosurgery;  Laterality: Bilateral;  [place]IRO  T10--Pelvis    REMOVAL OF HARDWARE FROM SPINE N/A 2/21/2022    Procedure: REMOVAL, HARDWARE, SPINE;  Surgeon: Guille Templeton MD;  Location: [facility] OR [address] [place] [address];  Service:  Neurosurgery;  Laterality: N/A;  [place]    SPINE SURGERY         Review of patient's allergies indicates:   Allergen Reactions    Bee venom protein (honey bee) Anaphylaxis     Patient reports she is allergic to bee stings.    Naproxen Anaphylaxis     Throat closing    12-23- Patient reports taking Ibuprofen 200 mg at home without problems. Verified X3. [place]    Wasp sting [allergen ext-venom-honey bee] Anaphylaxis    Adhesive Blisters    Iodine and iodide containing products Hives     Allergic to iodine in seafood only    Shellfish containing products     Nuts [tree nut] Rash       Current Facility-Administered Medications on File Prior to Encounter   Medication    [DISCONTINUED] amoxicillin capsule 1,000 mg    [DISCONTINUED] buPROPion TB24 tablet 300 mg    [DISCONTINUED] gabapentin capsule 600 mg    [DISCONTINUED] gadobutroL (GADAVIST) injection 10 mL    [DISCONTINUED] HYDROcodone-acetaminophen  mg per tablet 1 tablet    [DISCONTINUED] ibuprofen tablet 400 mg    [DISCONTINUED] lactulose 20 gram/30 mL solution Soln 20 g    [DISCONTINUED] LORazepam tablet 1 mg    [DISCONTINUED] magnesium hydroxide 400 mg/5 ml suspension 2,400 mg    [DISCONTINUED] magnesium oxide tablet 400 mg    [DISCONTINUED] melatonin tablet 6 mg    [DISCONTINUED] pantoprazole EC tablet 40 mg    [DISCONTINUED] polyethylene glycol packet 17 g    [DISCONTINUED] potassium chloride SA CR tablet 20 mEq    [DISCONTINUED] promethazine suppository 25 mg    [DISCONTINUED] traZODone tablet 100 mg     Current Outpatient Medications on File Prior to Encounter   Medication Sig    albuterol (ACCUNEB) 1.25 mg/3 mL Nebu Take 3 mLs (1.25 mg total) by nebulization every 6 (six) hours as needed (shortness of breath). Rescue    buPROPion (WELLBUTRIN) 100 MG tablet Take 1 tablet (100 mg total) by mouth 2 (two) times daily.    folic acid (FOLVITE) 1 MG tablet Take 1 tablet (1 mg total) by mouth once daily. (Patient not taking: Reported on  11/25/2022)    gabapentin (NEURONTIN) 300 MG capsule Take 1 capsule (300 mg total) by mouth 3 (three) times daily.    lactulose (CHRONULAC) 20 gram/30 mL Soln Take 30 mLs (20 g total) by mouth 3 (three) times daily.    pantoprazole (PROTONIX) 40 MG tablet Take 1 tablet (40 mg total) by mouth once daily.    [DISCONTINUED] diclofenac sodium (VOLTAREN) 1 % Gel Apply 2 g topically 4 (four) times daily.     Family History       Problem Relation (Age of Onset)    Cirrhosis Father    Drug abuse Mother, Father    Hepatitis Mother, Father    Liver cancer Mother          Tobacco Use    Smoking status: Some Days     Packs/day: 0.50     Years: 23.00     Pack years: 11.50     Types: Cigarettes    Smokeless tobacco: Never    Tobacco comments:     patient states she knows she nees to quit.   Substance and Sexual Activity    Alcohol use: Not Currently     Comment: quit 2014    Drug use: Yes     Frequency: 4.0 times per week     Types: Marijuana     Comment: Patient denies needle use, only inhalation of methampehtamines or orally.  Last known drug use was prior to admission to hospital stay for surgery    Sexual activity: Yes     Partners: Male     Birth control/protection: None     Review of Systems   Constitutional:  Positive for activity change, chills, fatigue and fever.   HENT:  Negative for sore throat and trouble swallowing.    Eyes:  Positive for visual disturbance (Chronic secondary to left eye nystagmus). Negative for discharge.   Respiratory:  Positive for shortness of breath (Exacerbated by sitting up). Negative for cough.    Cardiovascular:  Positive for leg swelling. Negative for chest pain.   Gastrointestinal:  Positive for diarrhea. Negative for abdominal pain, constipation, nausea and vomiting.   Genitourinary:  Negative for difficulty urinating and dysuria.   Musculoskeletal:  Positive for arthralgias and back pain.   Neurological:  Positive for tremors (Bilateral upper extremity), weakness (Bilateral  lower extremity) and numbness (Bilateral lower extremity). Negative for dizziness and headaches.   Psychiatric/Behavioral:  Negative for suicidal ideas.         Denies any suicidal or homicidal ideations   Objective:     Vital Signs (Most Recent):  Temp: 98.5 °F (36.9 °C) (01/20/23 0007)  Pulse: 108 (01/20/23 0007)  Resp: 16 (01/20/23 0216)  BP: 115/66 (01/20/23 0007)  SpO2: (!) 94 % (01/20/23 0007)   Vital Signs (24h Range):  Temp:  [97.8 °F (36.6 °C)-99.8 °F (37.7 °C)] 98.5 °F (36.9 °C)  Pulse:  [101-123] 108  Resp:  [16-20] 16  SpO2:  [93 %-99 %] 94 %  BP: (105-133)/(63-66) 115/66     Weight: 102.5 kg (225 lb 15.5 oz)  Body mass index is 35.39 kg/m².    Physical Exam  Vitals and nursing note reviewed.   Constitutional:       General: She is not in acute distress.     Appearance: She is obese. She is not ill-appearing, toxic-appearing or diaphoretic.   HENT:      Head: Normocephalic.      Mouth/Throat:      Mouth: Mucous membranes are moist.   Eyes:      General: No scleral icterus.        Right eye: No discharge.         Left eye: No discharge.      Extraocular Movements:      Left eye: Abnormal extraocular motion (strabismus) present.   Cardiovascular:      Rate and Rhythm: Normal rate and regular rhythm.      Heart sounds: Murmur heard.   Pulmonary:      Effort: Pulmonary effort is normal. No respiratory distress.      Breath sounds: Normal breath sounds.   Abdominal:      General: Bowel sounds are normal.      Palpations: Abdomen is soft.      Tenderness: There is no abdominal tenderness.   Musculoskeletal:      Cervical back: No rigidity.      Right lower leg: Edema (Mild) present.      Left lower leg: Edema (Mild) present.   Skin:     General: Skin is warm and dry.      Coloration: Skin is not jaundiced.   Neurological:      Mental Status: She is alert and oriented to person, place, and time. Mental status is at baseline.      Sensory: Sensory deficit (Decreased in bilateral LE) present.      Motor: Weakness  (Bilateral LE) and tremor (Bilateral UE) present.      Deep Tendon Reflexes: Reflexes abnormal. Babinski sign present on the right side. Babinski sign present on the left side.      Comments: Hyperreflexia in bilateral lower extremities   Psychiatric:         Mood and Affect: Mood normal.         Behavior: Behavior normal.           Significant Labs: All pertinent labs within the past 24 hours have been reviewed.  CBC:   Recent Labs   Lab 01/18/23  0526 01/19/23  0540   WBC 3.10* 3.25*   HGB 9.9* 9.6*   HCT 31.6* 30.1*   * 117*     CMP:   Recent Labs   Lab 01/18/23  0526 01/19/23  0540    139   K 4.0 3.9    109   CO2 29 27   GLU 83 113*   BUN 20 22*   CREATININE 0.4* 0.5   CALCIUM 8.7 8.2*   PROT 7.9 7.9   ALBUMIN 2.1* 2.1*   BILITOT 1.7* 1.4*   ALKPHOS 143* 137*   AST 51* 51*   ALT 31 32   ANIONGAP 2* 3*       Significant Imaging: I have reviewed all pertinent imaging results/findings within the past 24 hours.      Assessment/Plan:     * Weakness of both lower extremities  42 y.o F w/ hx of IV drug use (methamphetamine), chronic HCV with cirrhosis, spinal fusion (04/2021), epidural abscess with removal of hardware (02/2022), spinal fusion with hardware (T10 - Pelvis / 03/2022), obesity (BMI 39.3) and neurogenic bladder who initially presented to [facility] for evaluation of back pain and left leg weakness.  She reports initially noting the left leg weakness when she was about to go to the bathroom and was about to fall but was assisted by her boyfriend.   During hospitalization, she reports the weakness that started out in her left leg initially then spread upwards to her left thigh, left hip, then crossed over to the right hip and spread to her right leg.  Denies recent IV drug use. She reports her last incidence of drug use was more than 3 years ago and also denies tobacco, and alcohol abuse.  Reports cannabis use. back pain, lower extremity weakness(initally L>R, but now R>L), diarrhea.   Denies constipation, bladder or bowel incontinence, dysuria, dark urine.    She also reports more than 10 years ago she had an episode of a headache that lasted about 4 days and was associated with residual defects of a strabismus in the left eye with associated visual disturbances.  She also reports over the past few years her friends have noticed that she sometimes has word-finding difficulty during conversations.       OSH evaluation for weakness  --MRI brain: Nonspecific gliotic white matter signal change within bilateral cerebral hemispheres similar in extent and distribution greater than expected for patient age.  This may be related to chronic small vessel ischemia.  Numerous chronic systemic illnesses can result in this appearance. Demyelinating disease is also in the differential.  --MRI cervical spine: C3-C7 multilevel spondylosis  --MRI spine unable to be performed due to body habitus    Transfer to [facility] for neurosurgery evaluation, neurology evaluation and MRI    Differential diagnoses include, but not limited to:  -Multiple Sclerosis(history of past neurologic deficits, MRI brain with demyelinating disease concerns)  -[place] Syndrome(recent UTI infection, ascending pattern of weakness)  -Inflammatory myopathy(physical exam with proximal LE muscle weakness)  -Infected hardware vs. Spinal abscess(hx of infected hardware, IVDU is a risk factor)  -Central cord syndrome(less likely as weakness in LE>UE which would be atypical)       PLAN  -NPO and anticoagulation held for potential procedure.  Resume when appropriate  -Continue prophylaxis amoxicillin per OSH pharm ID recs due to history of infected hardware. Finished course of Meropenem at OSH, with repeat BC NGTD. Repeat blood cultures and resume broad spectrum abx if infection concerns on imaging or labs  -Followup spine imaging, LDH, CK, FOREIGN, CRP, ESR lab to rule out ddx  -Followup Neurosurgery consult   -Followup Neurology consult      Chronic  "hepatitis C with cirrhosis  See "Cirrhosis of liver with ascites" A&P        Cirrhosis of liver with ascites  -Chronic. Hx of Hepatitis C. Transaminitis present as far back as 2015 on labs.  -RUQ U/S 02/2022: Hepatic cirrhosis and portal hypertension with satisfactory Doppler evaluation of the liver.-  -Follows with GI outpatient with most recent visit in 05/2022 where she refused transplant consideration.  -Patient reported shortness of breath exacerbated by sitting up, concerning for platypnea.     MELD-Na score: 11 at 1/19/2023 10:53 PM  MELD score: 11 at 1/19/2023 10:53 PM  Calculated from:  Serum Creatinine: 0.5 mg/dL (Using min of 1 mg/dL) at 1/19/2023  5:40 AM  Serum Sodium: 139 mmol/L (Using max of 137 mmol/L) at 1/19/2023  5:40 AM  Total Bilirubin: 1.4 mg/dL at 1/19/2023  5:40 AM  INR(ratio): 1.3 at 1/19/2023 10:53 PM  Age: 42 years      PLAN  -Trend transaminitis on CMP  -Continue lactulose  -Follow-up right upper quadrant ultrasound.   -Screen for platypnea. Hepatology consult if concerns for hepatopulmonary syndrome  -Patient overdue for GI follow-up for hep C treatment.  on outpatient followup at discharge    Anxiety and depression  Recently evaluated by psychiatry at OSH     PLAN  -Continue Lorazepam PRN anxiety  -Per recent psych recs,  --Depression- Bupropion 300 mg XR qd  --Anxiety-Gabapentin off-label 400 mg TID.    Substance use disorder  Denies recent IV drug use. She reports her last incidence of drug use was more than 3 years ago and also denies tobacco, and alcohol abuse.  Reports cannabis use.    VTE Risk Mitigation (From admission, onward)         Ordered     IP VTE HIGH RISK PATIENT  Once         01/19/23 2153     Place sequential compression device  Until discontinued         01/19/23 2153     Reason for No Pharmacological VTE Prophylaxis  Once        Question:  Reasons:  Answer:  Physician Provided (leave comment)  Comment:  Potential NSGY procedure    01/19/23 2153               "     Marvin Rivera DO  Department of Hospital Medicine   [facility] - Neurosurgery ([facility])    Note was created using voice recognition software. It may have occasional typographical errors not identified and edited despite initial review prior to signing.

## 2023-01-20 NOTE — ASSESSMENT & PLAN NOTE
Denies recent IV drug use. She reports her last incidence of drug use was more than 3 years ago and also denies tobacco, and alcohol abuse.  Reports cannabis use.

## 2023-01-20 NOTE — HOSPITAL COURSE
Admitted for workup of LLE weakness, MRI and CT and was showed kyphotic deformities at T8-T9 & T9-T10 indicating spondylodiscitis. EKG shown sinus tachycardia but otherwise no abnormalities r/o ischemic causes. Echocardiogram showed mild pulmonary hypertension with moderate right atrial enlargement and moderate right ventricular enlargement. Collected blood cultures, pending.

## 2023-01-20 NOTE — CONSULTS
[facility] - Neurosurgery ([facility])  Neurosurgery  Consult Note    Inpatient consult to Neurosurgery  Consult performed by: Carlin Quach MD  Consult ordered by: Marvin Rivera DO         Subjective:     Chief Complaint/Reason for Admission: leg weakness    History of Present Illness: Ms. Zazueta is a 42 y.o F w/ hx ofIV drug use (methamphetamine), chronic HCV with cirrhosis, spinal fusion (04/2021), epidural abscess with removal of hardware (02/2022), spinal fusion with hardware (T10 - Pelvis / 03/2022), obesity (BMI 39.3) and neurogenic bladder and recent shoulder surgery who initially presented to [facility] for evaluation of back pain and left leg weakness.  She reports initially noting the left leg weakness when she was about to go to the bathroom and was about to fall but was assisted by her boyfriend.  She was brought to the ED via EMS.  Urinalysis with UTI concerns and blood cultures at OSH grew ESBL E coli, and shoulder effusion concern for infection so she was treated with meropenem.  During hospitalization, she reports the weakness that started out in her left leg initially then spread upwards to her left thigh, left hip, then crossed over to the right hip and spread to her right leg.  Denies recent IV drug use. She reports her last incidence of drug use was more than 3 years ago and also denies tobacco, and alcohol abuse.  Reports cannabis use.     OSH course notable for concerning urinalysis and blood cultures positive for ESBL E coli treated with meropenem.  She was also admit to the ICU where she was treated with Precedex for significant body aches, irritability, and muscle spasms.  Concerns of a murmur on physical exam so she underwent TTE which did not show any vegetations.  Worsening lower extremity weakness workup with MRI head nonspecific, MRI cervical spine with multilevel spondylosis, X-ray spine with multilevel thoracolumbar fusion and diskectomy, focal kyphosis at T9-10  increased compared to prior.  She was started on prophylaxis amoxicillin due to her history of infected hardware.  MRI spine unable to be completed due to patient's body habitus so she was transferred to [facility] for further imaging and Neurosurgery, Neurology evaluation.      Medications Prior to Admission   Medication Sig Dispense Refill Last Dose    albuterol (ACCUNEB) 1.25 mg/3 mL Nebu Take 3 mLs (1.25 mg total) by nebulization every 6 (six) hours as needed (shortness of breath). Rescue 75 mL 0     buPROPion (WELLBUTRIN) 100 MG tablet Take 1 tablet (100 mg total) by mouth 2 (two) times daily. 60 tablet 2     folic acid (FOLVITE) 1 MG tablet Take 1 tablet (1 mg total) by mouth once daily. (Patient not taking: Reported on 11/25/2022) 42 tablet 0     gabapentin (NEURONTIN) 300 MG capsule Take 1 capsule (300 mg total) by mouth 3 (three) times daily. 90 capsule 11     lactulose (CHRONULAC) 20 gram/30 mL Soln Take 30 mLs (20 g total) by mouth 3 (three) times daily. 2700 mL 2     pantoprazole (PROTONIX) 40 MG tablet Take 1 tablet (40 mg total) by mouth once daily. 30 tablet 1        Review of patient's allergies indicates:   Allergen Reactions    Bee venom protein (honey bee) Anaphylaxis     Patient reports she is allergic to bee stings.    Naproxen Anaphylaxis     Throat closing    12-23- Patient reports taking Ibuprofen 200 mg at home without problems. Verified X3. [place]    Wasp sting [allergen ext-venom-honey bee] Anaphylaxis    Adhesive Blisters    Iodine and iodide containing products Hives     Allergic to iodine in seafood only    Shellfish containing products     Nuts [tree nut] Rash       Past Medical History:   Diagnosis Date    Anemia     Anxiety     Depressed     Diskitis     Hep C w/o coma, chronic     IV drug abuse     Kidney stone     Liver cirrhosis      Past Surgical History:   Procedure Laterality Date    ARTHROTOMY OF SHOULDER Left 11/15/2022    Procedure: ARTHROTOMY, SHOULDER;  Surgeon:  Bjorn Hayes MD;  [facility];  Service: Orthopedics;  Laterality: Left;  Left acromioclavicular joint arthrotomy    BACK SURGERY      benine tumor removal      forehead, 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9    CHOLECYSTECTOMY      KIDNEY SURGERY      LUMBAR FUSION Bilateral 3/2/2022    Procedure: FUSION, SPINE, LUMBAR;  Surgeon: Guille Templeton MD;  [facility];  Service: Neurosurgery;  Laterality: Bilateral;  AIRO  T10--Pelvis    REMOVAL OF HARDWARE FROM SPINE N/A 2/21/2022    Procedure: REMOVAL, HARDWARE, SPINE;  Surgeon: Guille Templeton MD;  Location: [facility] OR [address] [place] [address];  Service: Neurosurgery;  Laterality: N/A;  [place]    SPINE SURGERY       Family History       Problem Relation (Age of Onset)    Cirrhosis Father    Drug abuse Mother, Father    Hepatitis Mother, Father    Liver cancer Mother          Tobacco Use    Smoking status: Some Days     Packs/day: 0.50     Years: 23.00     Pack years: 11.50     Types: Cigarettes    Smokeless tobacco: Never    Tobacco comments:     patient states she knows she nees to quit.   Substance and Sexual Activity    Alcohol use: Not Currently     Comment: quit 2014    Drug use: Yes     Frequency: 4.0 times per week     Types: Marijuana     Comment: Patient denies needle use, only inhalation of methampehtamines or orally.  Last known drug use was prior to admission to hospital stay for surgery    Sexual activity: Yes     Partners: Male     Birth control/protection: None     Review of Systems   Constitutional:  Negative for fever.   Eyes:  Negative for visual disturbance.   Respiratory:  Negative for shortness of breath.    Cardiovascular:  Negative for chest pain.   Gastrointestinal:  Negative for nausea and vomiting.   Musculoskeletal:  Positive for back pain. Negative for neck pain.   Neurological:  Positive for weakness. Negative for dizziness, seizures, light-headedness, numbness and headaches.   Objective:     Weight: 102.5 kg (225 lb 15.5 oz)  Body mass index is 35.39  kg/m².  Vital Signs (Most Recent):  Temp: 98 °F (36.7 °C) (01/20/23 0618)  Pulse: 109 (01/20/23 0618)  Resp: 18 (01/20/23 0618)  BP: (!) 102/55 (01/20/23 0618)  SpO2: 95 % (01/20/23 0618)   Vital Signs (24h Range):  Temp:  [97.8 °F (36.6 °C)-98.5 °F (36.9 °C)] 98 °F (36.7 °C)  Pulse:  [101-109] 109  Resp:  [16-20] 18  SpO2:  [94 %-99 %] 95 %  BP: (102-133)/(55-66) 102/55                          Physical Exam    Neurosurgery Physical Exam  General: well developed, well nourished, no distress.   Head: normocephalic, atraumatic  Neurologic: Alert and oriented. Thought content appropriate.  GCS: Motor: 6/Verbal: 5/Eyes: 4 GCS Total: 15  Mental Status: Awake, Alert, Oriented x 4  Language: No aphasia  Speech: No dysarthria  Cranial nerves: face symmetric, tongue midline, CN II-XII grossly intact.   Eyes: pupils equal, round, reactive to light with accommodation, EOMI.   Pulmonary: normal respirations, no signs of respiratory distress  Abdomen: soft, non-distended, not tender to palpation  Skin: Skin is warm, dry and intact.  Sensory: intact to light touch throughout    Motor Strength:Moves all extremities spontaneously with good tone.  Full strength upper and lower extremities. No abnormal movements seen.     Strength  Deltoids Triceps Biceps Wrist Extension Wrist Flexion Hand    Upper: R 5/5 5/5 5/5 5/5 5/5 5/5    L 5/5 5/5 5/5 5/5 5/5 5/5     Iliopsoas Quadriceps Knee  Flexion Tibialis  anterior Gastro- cnemius EHL   Lower: R 3/5 3/5 2/5 2/5 2/5 2/5    L 3/5 3/5 2/5 2/5 2/5 2/5     Reflexes:   DTR: 2+ symmetrically throughout.  Pakrer's: Negative.  Babinski's: Negative.  Clonus: 2 beats bilateral lower extremity    Incision well healed      Significant Labs:  Recent Labs   Lab 01/19/23  0540   *      K 3.9      CO2 27   BUN 22*   CREATININE 0.5   CALCIUM 8.2*   MG 1.8     Recent Labs   Lab 01/19/23  0540   WBC 3.25*   HGB 9.6*   HCT 30.1*   *     Recent Labs   Lab 01/19/23  2253   INR  1.3*   APTT 27.6     Microbiology Results (last 7 days)       Procedure Component Value Units Date/Time    Urine culture [[id number]] Collected: 01/20/23 0130    Order Status: No result Specimen: Urine Updated: 01/20/23 0353    Culture, Respiratory with Gram Stain [[id number]]     Order Status: Canceled Specimen: Respiratory           All pertinent labs from the last 24 hours have been reviewed.    Significant Diagnostics:  I have reviewed all pertinent imaging results/findings within the past 24 hours.  I have reviewed and interpreted all pertinent imaging results/findings within the past 24 hours.    Assessment/Plan:     * Weakness of both lower extremities  Pt is 42F multiple spinal surgeries and revisions last T10- Pelvis in March 2022 who presented to OSH with concern for shoulder infection and UTI found to have E coli sepsis who has had progressive worsening BLE weakness, XR showed possible worsening proximal junctional kyphotic deformity. Transferred to [facility] to  and neurosurgery was consulted    Plan:  Admitted to  floor status  -- FU MRI T/L spine w w/o contrast  -- FU CT T/L spine thin cut  -- Continue ID recs at this time  -- Full preop labs and NPO at this time until imaging complete and plan discussed  Further recs to follow imaging completion. Please contact team with any further questions.        Thank you for your consult. I will follow-up with patient. Please contact us if you have any additional questions.    Carlin Quach MD  Neurosurgery  [facility] - Neurosurgery ([facility])

## 2023-01-20 NOTE — PT/OT/SLP PROGRESS
"Occupational Therapy      Patient Name:  Rachel Zazueta   MRN:  [id number]    Patient not seen today secondary to MD hold (per Neurosurgery consult, "Plan for OR Tuesday for hardware removal, washout, and re-instrumentation/extension of fusion to T4. Ok for diet today. Medical optimization prior to surgery per primary team. TLSO brace if getting out of bed"). Will follow up as appropriate for OT eval once TLSO is obtained.      1/20/2023  "

## 2023-01-20 NOTE — ASSESSMENT & PLAN NOTE
42 y.o F w/ hx of IV drug use (methamphetamine), chronic HCV with cirrhosis, spinal fusion (04/2021), epidural abscess with removal of hardware (02/2022), spinal fusion with hardware (T10 - Pelvis / 03/2022), obesity (BMI 39.3) and neurogenic bladder who initially presented to [facility] for evaluation of back pain and left leg weakness.  She reports initially noting the left leg weakness when she was about to go to the bathroom and was about to fall but was assisted by her boyfriend.   During hospitalization, she reports the weakness that started out in her left leg initially then spread upwards to her left thigh, left hip, then crossed over to the right hip and spread to her right leg.  Denies recent IV drug use. She reports her last incidence of drug use was more than 3 years ago and also denies tobacco, and alcohol abuse.  Reports cannabis use. back pain, lower extremity weakness(initally L>R, but now R>L), diarrhea.  Denies constipation, bladder or bowel incontinence, dysuria, dark urine.    She also reports more than 10 years ago she had an episode of a headache that lasted about 4 days and was associated with residual defects of a strabismus in the left eye with associated visual disturbances.  She also reports over the past few years her friends have noticed that she sometimes has word-finding difficulty during conversations.       OSH evaluation for weakness  --MRI brain: Nonspecific gliotic white matter signal change within bilateral cerebral hemispheres similar in extent and distribution greater than expected for patient age.  This may be related to chronic small vessel ischemia.  Numerous chronic systemic illnesses can result in this appearance. Demyelinating disease is also in the differential.  --MRI cervical spine: C3-C7 multilevel spondylosis  --MRI spine unable to be performed due to body habitus    Transfer to [facility] for neurosurgery evaluation, neurology evaluation and MRI    Differential  diagnoses include, but not limited to:  -Multiple Sclerosis(history of past neurologic deficits, MRI brain with demyelinating disease concerns)  -[place] Syndrome(recent UTI infection, ascending pattern of weakness)  -Inflammatory myopathy(physical exam with proximal LE muscle weakness)  -Infected hardware vs. Spinal abscess(hx of infected hardware, IVDU is a risk factor)  -Central cord syndrome(less likely as weakness in LE>UE which would be atypical)       PLAN  -NPO and anticoagulation held for potential procedure.  Resume when appropriate  -Continue prophylaxis amoxicillin per OSH pharm ID recs due to history of infected hardware. Finished course of Meropenem at OSH, with repeat BC NGTD. Repeat blood cultures and resume broad spectrum abx if infection concerns on imaging or labs  -Followup spine imaging, LDH, CK, FOREIGN, CRP, ESR lab to rule out ddx  -Followup Neurosurgery consult   -Followup Neurology consult

## 2023-01-20 NOTE — ASSESSMENT & PLAN NOTE
43 yo F w/ PMHx of IVDU (amphetamines, 3 years clean), Cirrhosis 2/2 HCV, spinal fusion (4/2021), epidural abscess w/ removal of hardware (2/2022) w/ subsequent T10 spinal fusion w/ hardware (3/2022) and neurogenic bladder presented to [facility] from [facility] for further imaging eval of back and L leg weakness. Pt was at OSH for E.coli ESBL Bacteremia tx w/ 7 days of Meropenem (12/26-1/3) and Candida UTI w/ 7 days Fluconazole (12/26-1/1). Pt was supposed to be d/c'd 1/4 but was too debilitated to support her own weight w/ a walker. At OSH, while working w/ PT/OT, she was having worsening LE weakness and unable to walk; intermittent control over voiding and BM; Babinski pos bilat. MRI cervical spine pos for multilevel spondylosis.  MRI brain w/ nonspecific gliotic white matter signal change within bilateral cerebral hemispheres similar in extent and distribution greater than expected for patient age. X-ray of spine pos for multilevel thoracolumbar fusion and diskectomy.  Also, a focal kyphosis at T9-10 increased when compared to prior scoliosis radiographs. She has been on ppx amoxicillin due to her hx of infected hardware since 6 days PTA. OSH unable to perform spinal MRI due to body size, transferred here to continue workup. [facility]GSY consulted, rec MRI T/L spine w/ w/o contrast and CT T/L spine thin cuts; MRI T/L spine showing extensive edema signal throughout T-L posterior paraspinal mcs; cord w/ questionable signal in cervical and suppoer thoracic spine (minimal), focal kyphosis T8-T10 with severe anterior height loss of the T9 vertebral body.  Possible associated cord edema signal, conus unable to be visualized 2/2 artifact. CT pending.

## 2023-01-20 NOTE — CARE UPDATE
Imaging reviewed with staff. Worsening PJK at T9-10 noted with concern for discitis at T8-9, T9-10. Plan for OR Tuesday for hardware removal, washout, and re-instrumentation/extension of fusion to T4. Ok for diet today. Medical optimization prior to surgery per primary team. TLSO brace if getting out of bed. Please notify NSGY for acute changes in exam. Patient in agreement to surgery on Tuesday.       Lazara Yost PA-C  Neurosurgery  [facility]

## 2023-01-20 NOTE — HPI
41 yo F w/ PMHx of IVDU (amphetamines, 3 years clean), Cirrhosis 2/2 HCV, spinal fusion (4/2021), epidural abscess w/ removal of hardware (2/2022) w/ subsequent T10 spinal fusion w/ hardware (3/2022) and neurogenic bladder presented to [facility] from [facility] for further imaging eval of back and L leg weakness. Pt was at OSH for E.coli ESBL Bacteremia tx w/ 7 days of Meropenem (12/26-1/3) and Candida UTI w/ 7 days Fluconazole (12/26-1/1). Pt was supposed to be d/c'd 1/4 but was too debilitated to support her own weight w/ a walker. At OSH, while working w/ PT/OT, she was having worsening LE weakness and unable to walk; intermittent control over voiding and BM; Babinski pos bilat. MRI cervical spine pos for multilevel spondylosis.  MRI brain with nonspecific findings.  X-ray of spine pos for multilevel thoracolumbar fusion and diskectomy.  Also, a focal kyphosis at T9-10 increased when compared to prior scoliosis radiographs. She has been on ppx amoxicillin due to her hx of infected hardware since 6 days PTA. OSH unable to perform spinal MRI due to body size, transferred here to continue workup.

## 2023-01-20 NOTE — PT/OT/SLP PROGRESS
"Physical Therapy      Patient Name:  Rachel Zazueta   MRN:  [id number]    Patient not seen today secondary to Therapist assessment, MD hold (Comment). Per imaging obtained this AM, pt w/ "worsening PJK at T9-10 noted with concern for discitis at T8-9, T9-10". Therapist reached out to Primary MD to determine pt appropriateness and any needs for a brace. Per Neurosurgery consult, "Plan for OR Tuesday for hardware removal, washout, and re-instrumentation/extension of fusion to T4. Ok for diet today. Medical optimization prior to surgery per primary team. TLSO brace if getting out of bed." Will hold for therapy today and follow up once TLSO is obtained.  Danielle Dixon, PT   "

## 2023-01-20 NOTE — HPI
Ms. Zazueta is a 42 y.o F w/ hx ofIV drug use (methamphetamine), chronic HCV with cirrhosis, spinal fusion (04/2021), epidural abscess with removal of hardware (02/2022), spinal fusion with hardware (T10 - Pelvis / 03/2022), obesity (BMI 39.3) and neurogenic bladder who initially presented to [facility] for evaluation of back pain and left leg weakness.  She reports initially noting the left leg weakness when she was about to go to the bathroom and was about to fall but was assisted by her boyfriend.  She was brought to the ED via EMS.  Urinalysis with UTI concerns and blood cultures at OSH grew ESBL E coli so she was treated with meropenem.  During hospitalization, she reports the weakness that started out in her left leg initially then spread upwards to her left thigh, left hip, then crossed over to the right hip and spread to her right leg.  Denies recent IV drug use. She reports her last incidence of drug use was more than 3 years ago and also denies tobacco, and alcohol abuse.  Reports cannabis use.    She also reports more than 10 years ago she had an episode of a headache that lasted about 4 days and was associated with residual defects of a strabismus in the left eye with associated visual disturbances.  She also reports over the past few years her friends have noticed that she sometimes has word-finding difficulty during conversations.     Reports shortness of breath when sitting up, fever, chills, back pain, lower extremity weakness(initally L>R, but now R>L), diarrhea.  Denies cough, nausea, vomiting, constipation, bladder or bowel incontinence, dysuria, dark urine, new vision changes.    OSH course notable for concerning urinalysis and blood cultures positive for ESBL E coli treated with meropenem.  She was also admit to the ICU where she was treated with Precedex for significant body aches, irritability, and muscle spasms.  Concerns of a murmur on physical exam so she underwent TTE which did  not show any vegetations.  Worsening lower extremity weakness workup with MRI head nonspecific, MRI cervical spine with multilevel spondylosis, X-ray spine with multilevel thoracolumbar fusion and diskectomy, focal kyphosis at T9-10 increased compared to prior.  She was started on prophylaxis amoxicillin due to her history of infected hardware.  MRI spine unable to be completed due to patient's body habitus so she was transferred to [facility] for further imaging and Neurosurgery, Neurology evaluation.

## 2023-01-20 NOTE — HPI
Ms. Zazueta is a 42 y.o F w/ hx ofIV drug use (methamphetamine), chronic HCV with cirrhosis, spinal fusion (04/2021), epidural abscess with removal of hardware (02/2022), spinal fusion with hardware (T10 - Pelvis / 03/2022), obesity (BMI 39.3) and neurogenic bladder and recent shoulder surgery who initially presented to [facility] for evaluation of back pain and left leg weakness.  She reports initially noting the left leg weakness when she was about to go to the bathroom and was about to fall but was assisted by her boyfriend.  She was brought to the ED via EMS.  Urinalysis with UTI concerns and blood cultures at OSH grew ESBL E coli, and shoulder effusion concern for infection so she was treated with meropenem.  During hospitalization, she reports the weakness that started out in her left leg initially then spread upwards to her left thigh, left hip, then crossed over to the right hip and spread to her right leg.  Denies recent IV drug use. She reports her last incidence of drug use was more than 3 years ago and also denies tobacco, and alcohol abuse.  Reports cannabis use.     OSH course notable for concerning urinalysis and blood cultures positive for ESBL E coli treated with meropenem.  She was also admit to the ICU where she was treated with Precedex for significant body aches, irritability, and muscle spasms.  Concerns of a murmur on physical exam so she underwent TTE which did not show any vegetations.  Worsening lower extremity weakness workup with MRI head nonspecific, MRI cervical spine with multilevel spondylosis, X-ray spine with multilevel thoracolumbar fusion and diskectomy, focal kyphosis at T9-10 increased compared to prior.  She was started on prophylaxis amoxicillin due to her history of infected hardware.  MRI spine unable to be completed due to patient's body habitus so she was transferred to [facility] for further imaging and Neurosurgery, Neurology evaluation.

## 2023-01-20 NOTE — PT/OT/SLP DISCHARGE
Physical Therapy Discharge Summary    Name: Rachel Zazueta  MRN: [id number]   Principal Problem: Debility     Patient Discharged from acute Physical Therapy on 2023.  Please refer to prior PT noted date on 2023 for functional status.     Assessment:     Patient appropriate for care in another setting.    Objective:     GOALS:   Multidisciplinary Problems       Physical Therapy Goals          Problem: Physical Therapy    Goal Priority Disciplines Outcome Goal Variances Interventions   Physical Therapy Goal     PT, PT/OT Adequate for Care Transition     Description: Goals to be met by: 2023     Patient will increase functional independence with mobility by performin. Supine to sit with Min assist   2. Sit to supine with Min assist   3. Sit to stand transfer with Mod assist x 2 with RW   4. Bed to chair transfer with Mod/max assist  using Rolling Walker  6. Sitting at edge of bed x10 minutes with SBA  t                         Reasons for Discontinuation of Therapy Services  Transfer to alternate level of care.      Plan:     Patient Discharged to:  acute care hospital for medical management  .      2023

## 2023-01-21 PROBLEM — M40.205 KYPHOSIS OF THORACOLUMBAR REGION: Status: ACTIVE | Noted: 2023-01-21

## 2023-01-21 PROBLEM — F19.90 SUBSTANCE USE DISORDER: Chronic | Status: ACTIVE | Noted: 2017-03-15

## 2023-01-21 PROBLEM — M46.44 THORACIC DISCITIS: Status: ACTIVE | Noted: 2023-01-21

## 2023-01-21 PROBLEM — K74.60 CHRONIC HEPATITIS C WITH CIRRHOSIS: Chronic | Status: ACTIVE | Noted: 2017-01-08

## 2023-01-21 PROBLEM — B18.2 CHRONIC HEPATITIS C WITH CIRRHOSIS: Chronic | Status: ACTIVE | Noted: 2017-01-08

## 2023-01-21 PROBLEM — F32.A ANXIETY AND DEPRESSION: Chronic | Status: ACTIVE | Noted: 2023-01-12

## 2023-01-21 PROBLEM — F41.9 ANXIETY AND DEPRESSION: Chronic | Status: ACTIVE | Noted: 2023-01-12

## 2023-01-21 LAB
ALBUMIN SERPL BCP-MCNC: 2 G/DL (ref 3.5–5.2)
ALP SERPL-CCNC: 121 U/L (ref 55–135)
ALT SERPL W/O P-5'-P-CCNC: 22 U/L (ref 10–44)
ANION GAP SERPL CALC-SCNC: 6 MMOL/L (ref 8–16)
AST SERPL-CCNC: 39 U/L (ref 10–40)
BACTERIA UR CULT: NORMAL
BACTERIA UR CULT: NORMAL
BASOPHILS # BLD AUTO: 0.01 K/UL (ref 0–0.2)
BASOPHILS NFR BLD: 0.5 % (ref 0–1.9)
BILIRUB SERPL-MCNC: 1.6 MG/DL (ref 0.1–1)
BUN SERPL-MCNC: 16 MG/DL (ref 6–20)
CALCIUM SERPL-MCNC: 8.3 MG/DL (ref 8.7–10.5)
CHLORIDE SERPL-SCNC: 105 MMOL/L (ref 95–110)
CO2 SERPL-SCNC: 24 MMOL/L (ref 23–29)
CREAT SERPL-MCNC: 0.5 MG/DL (ref 0.5–1.4)
DIFFERENTIAL METHOD: ABNORMAL
EOSINOPHIL # BLD AUTO: 0.1 K/UL (ref 0–0.5)
EOSINOPHIL NFR BLD: 3.4 % (ref 0–8)
ERYTHROCYTE [DISTWIDTH] IN BLOOD BY AUTOMATED COUNT: 17.2 % (ref 11.5–14.5)
EST. GFR  (NO RACE VARIABLE): >60 ML/MIN/1.73 M^2
GLUCOSE SERPL-MCNC: 74 MG/DL (ref 70–110)
HCT VFR BLD AUTO: 29.5 % (ref 37–48.5)
HGB BLD-MCNC: 8.9 G/DL (ref 12–16)
IMM GRANULOCYTES # BLD AUTO: 0.01 K/UL (ref 0–0.04)
IMM GRANULOCYTES NFR BLD AUTO: 0.5 % (ref 0–0.5)
LYMPHOCYTES # BLD AUTO: 0.4 K/UL (ref 1–4.8)
LYMPHOCYTES NFR BLD: 18.3 % (ref 18–48)
MAGNESIUM SERPL-MCNC: 1.6 MG/DL (ref 1.6–2.6)
MCH RBC QN AUTO: 27.6 PG (ref 27–31)
MCHC RBC AUTO-ENTMCNC: 30.2 G/DL (ref 32–36)
MCV RBC AUTO: 91 FL (ref 82–98)
MONOCYTES # BLD AUTO: 0.2 K/UL (ref 0.3–1)
MONOCYTES NFR BLD: 9.1 % (ref 4–15)
NEUTROPHILS # BLD AUTO: 1.4 K/UL (ref 1.8–7.7)
NEUTROPHILS NFR BLD: 68.2 % (ref 38–73)
NRBC BLD-RTO: 0 /100 WBC
PHOSPHATE SERPL-MCNC: 4 MG/DL (ref 2.7–4.5)
PLATELET # BLD AUTO: 83 K/UL (ref 150–450)
PMV BLD AUTO: 9.5 FL (ref 9.2–12.9)
POTASSIUM SERPL-SCNC: 3.7 MMOL/L (ref 3.5–5.1)
PROT SERPL-MCNC: 6.9 G/DL (ref 6–8.4)
RBC # BLD AUTO: 3.23 M/UL (ref 4–5.4)
SODIUM SERPL-SCNC: 135 MMOL/L (ref 136–145)
WBC # BLD AUTO: 2.08 K/UL (ref 3.9–12.7)

## 2023-01-21 PROCEDURE — 11000001 HC ACUTE MED/SURG PRIVATE ROOM

## 2023-01-21 PROCEDURE — 99232 PR SUBSEQUENT HOSPITAL CARE,LEVL II: ICD-10-PCS | Mod: ,,, | Performed by: STUDENT IN AN ORGANIZED HEALTH CARE EDUCATION/TRAINING PROGRAM

## 2023-01-21 PROCEDURE — 36415 COLL VENOUS BLD VENIPUNCTURE: CPT | Performed by: STUDENT IN AN ORGANIZED HEALTH CARE EDUCATION/TRAINING PROGRAM

## 2023-01-21 PROCEDURE — 25000003 PHARM REV CODE 250: Performed by: STUDENT IN AN ORGANIZED HEALTH CARE EDUCATION/TRAINING PROGRAM

## 2023-01-21 PROCEDURE — 80053 COMPREHEN METABOLIC PANEL: CPT | Performed by: STUDENT IN AN ORGANIZED HEALTH CARE EDUCATION/TRAINING PROGRAM

## 2023-01-21 PROCEDURE — 97530 THERAPEUTIC ACTIVITIES: CPT

## 2023-01-21 PROCEDURE — 84100 ASSAY OF PHOSPHORUS: CPT | Performed by: STUDENT IN AN ORGANIZED HEALTH CARE EDUCATION/TRAINING PROGRAM

## 2023-01-21 PROCEDURE — 25000003 PHARM REV CODE 250

## 2023-01-21 PROCEDURE — 63600175 PHARM REV CODE 636 W HCPCS

## 2023-01-21 PROCEDURE — 99223 1ST HOSP IP/OBS HIGH 75: CPT | Mod: ,,, | Performed by: NURSE PRACTITIONER

## 2023-01-21 PROCEDURE — 99223 PR INITIAL HOSPITAL CARE,LEVL III: ICD-10-PCS | Mod: ,,, | Performed by: NURSE PRACTITIONER

## 2023-01-21 PROCEDURE — 97166 OT EVAL MOD COMPLEX 45 MIN: CPT

## 2023-01-21 PROCEDURE — 25000003 PHARM REV CODE 250: Performed by: INTERNAL MEDICINE

## 2023-01-21 PROCEDURE — 83735 ASSAY OF MAGNESIUM: CPT | Performed by: STUDENT IN AN ORGANIZED HEALTH CARE EDUCATION/TRAINING PROGRAM

## 2023-01-21 PROCEDURE — 99232 SBSQ HOSP IP/OBS MODERATE 35: CPT | Mod: ,,, | Performed by: STUDENT IN AN ORGANIZED HEALTH CARE EDUCATION/TRAINING PROGRAM

## 2023-01-21 PROCEDURE — 85025 COMPLETE CBC W/AUTO DIFF WBC: CPT | Performed by: STUDENT IN AN ORGANIZED HEALTH CARE EDUCATION/TRAINING PROGRAM

## 2023-01-21 PROCEDURE — 97535 SELF CARE MNGMENT TRAINING: CPT

## 2023-01-21 PROCEDURE — 97163 PT EVAL HIGH COMPLEX 45 MIN: CPT

## 2023-01-21 PROCEDURE — 27000207 HC ISOLATION

## 2023-01-21 RX ORDER — LORAZEPAM 0.5 MG/1
0.5 TABLET ORAL EVERY 12 HOURS PRN
Status: DISCONTINUED | OUTPATIENT
Start: 2023-01-21 | End: 2023-01-24

## 2023-01-21 RX ORDER — HYDROCODONE BITARTRATE AND ACETAMINOPHEN 5; 325 MG/1; MG/1
1 TABLET ORAL EVERY 4 HOURS PRN
Status: DISCONTINUED | OUTPATIENT
Start: 2023-01-21 | End: 2023-01-22

## 2023-01-21 RX ORDER — ENOXAPARIN SODIUM 100 MG/ML
40 INJECTION SUBCUTANEOUS EVERY 24 HOURS
Status: DISCONTINUED | OUTPATIENT
Start: 2023-01-21 | End: 2023-01-23

## 2023-01-21 RX ORDER — HYDROMORPHONE HYDROCHLORIDE 2 MG/1
2 TABLET ORAL ONCE
Status: COMPLETED | OUTPATIENT
Start: 2023-01-21 | End: 2023-01-21

## 2023-01-21 RX ADMIN — BUPROPION HYDROCHLORIDE 300 MG: 300 TABLET, FILM COATED, EXTENDED RELEASE ORAL at 09:01

## 2023-01-21 RX ADMIN — ACETAMINOPHEN 650 MG: 325 TABLET ORAL at 05:01

## 2023-01-21 RX ADMIN — GABAPENTIN 400 MG: 400 CAPSULE ORAL at 09:01

## 2023-01-21 RX ADMIN — POTASSIUM CHLORIDE 20 MEQ: 1500 TABLET, EXTENDED RELEASE ORAL at 09:01

## 2023-01-21 RX ADMIN — AMOXICILLIN 1000 MG: 500 CAPSULE ORAL at 09:01

## 2023-01-21 RX ADMIN — POLYETHYLENE GLYCOL 3350 17 G: 17 POWDER, FOR SOLUTION ORAL at 09:01

## 2023-01-21 RX ADMIN — HYDROCODONE BITARTRATE AND ACETAMINOPHEN 1 TABLET: 5; 325 TABLET ORAL at 07:01

## 2023-01-21 RX ADMIN — METHOCARBAMOL 500 MG: 500 TABLET ORAL at 12:01

## 2023-01-21 RX ADMIN — HYDROCODONE BITARTRATE AND ACETAMINOPHEN 1 TABLET: 5; 325 TABLET ORAL at 12:01

## 2023-01-21 RX ADMIN — LORAZEPAM 0.5 MG: 0.5 TABLET ORAL at 09:01

## 2023-01-21 RX ADMIN — HYDROMORPHONE HYDROCHLORIDE 2 MG: 2 TABLET ORAL at 08:01

## 2023-01-21 RX ADMIN — Medication 6 MG: at 09:01

## 2023-01-21 RX ADMIN — PANTOPRAZOLE SODIUM 40 MG: 40 TABLET, DELAYED RELEASE ORAL at 09:01

## 2023-01-21 RX ADMIN — ENOXAPARIN SODIUM 40 MG: 40 INJECTION SUBCUTANEOUS at 04:01

## 2023-01-21 RX ADMIN — HYDROCODONE BITARTRATE AND ACETAMINOPHEN 1 TABLET: 5; 325 TABLET ORAL at 04:01

## 2023-01-21 RX ADMIN — GABAPENTIN 400 MG: 400 CAPSULE ORAL at 02:01

## 2023-01-21 RX ADMIN — LACTULOSE 20 G: 20 SOLUTION ORAL at 09:01

## 2023-01-21 RX ADMIN — METHOCARBAMOL 500 MG: 500 TABLET ORAL at 09:01

## 2023-01-21 RX ADMIN — LACTULOSE 20 G: 20 SOLUTION ORAL at 02:01

## 2023-01-21 NOTE — SUBJECTIVE & OBJECTIVE
Interval History: 1/21: NAEON. AFVSS. Plts 83. Bcx NGTD. CT C spine with concern for C6-7 discitis, flex ex with limited view.    Medications:  Continuous Infusions:  Scheduled Meds:   amoxicillin  1,000 mg Oral Q12H    buPROPion  300 mg Oral Daily    enoxaparin  40 mg Subcutaneous Daily    gabapentin  400 mg Oral TID    lactulose  20 g Oral TID    pantoprazole  40 mg Oral Daily    polyethylene glycol  17 g Oral Daily    potassium chloride  20 mEq Oral Daily     PRN Meds:acetaminophen, dextrose 10%, dextrose 10%, glucagon (human recombinant), glucose, glucose, HYDROcodone-acetaminophen, LORazepam, magnesium hydroxide 400 mg/5 ml, melatonin, methocarbamoL, naloxone, promethazine, sodium chloride 0.9%     Review of Systems  Objective:     Weight: 102.1 kg (225 lb)  Body mass index is 35.24 kg/m².  Vital Signs (Most Recent):  Temp: 98.6 °F (37 °C) (01/21/23 1612)  Pulse: 95 (01/21/23 1612)  Resp: 18 (01/21/23 1641)  BP: 117/64 (01/21/23 1612)  SpO2: 97 % (01/21/23 1612) Vital Signs (24h Range):  Temp:  [97.8 °F (36.6 °C)-98.6 °F (37 °C)] 98.6 °F (37 °C)  Pulse:  [] 95  Resp:  [16-19] 18  SpO2:  [91 %-97 %] 97 %  BP: ()/(52-65) 117/64     Date 01/21/23 0700 - 01/22/23 0659   Shift 1610-4899 9577-3035 0420-6182 24 Hour Total   INTAKE   P.O. 400   400   Shift Total(mL/kg) 400(3.9)   400(3.9)   OUTPUT   Shift Total(mL/kg)       Weight (kg) 102.1 102.1 102.1 102.1                          Neurosurgery Physical Exam  General: well developed, well nourished, no distress.   Head: normocephalic, atraumatic  Neurologic: Alert and oriented. Thought content appropriate.  GCS: Motor: 6/Verbal: 5/Eyes: 4 GCS Total: 15  Mental Status: Awake, Alert, Oriented x 4  Language: No aphasia  Speech: No dysarthria  Cranial nerves: face symmetric, tongue midline, CN II-XII grossly intact.   Eyes: pupils equal, round, reactive to light with accommodation, EOMI, nystagmus.   Pulmonary: normal respirations, no signs of respiratory  distress  Abdomen: soft, non-distended, not tender to palpation  Skin: Skin is warm, dry and intact.  Sensory: intact to light touch throughout     Motor Strength:Moves all extremities spontaneously with good tone.  Full strength upper and lower extremities. No abnormal movements seen.      Strength   Deltoids Triceps Biceps Wrist Extension Wrist Flexion Hand    Upper: R 5/5 5/5 5/5 5/5 5/5 5/5     L 5/5 5/5 5/5 5/5 5/5 5/5       Iliopsoas Quadriceps Knee  Flexion Tibialis  anterior Gastro- cnemius EHL   Lower: R 2/5 1/5 2/5 1/5 1/5 1/5     L 2/5 2/5 1/5 1/5 1/5 1/5      Reflexes:   DTR: 2+ symmetrically throughout.  Parker's: Negative.  Babinski's: Negative.  Clonus: 2 beats bilateral lower extremity     Incision well healed       Significant Labs:  Recent Labs   Lab 01/20/23  0543 01/21/23  0319   GLU 87 74    135*   K 4.2 3.7    105   CO2 22* 24   BUN 20 16   CREATININE 0.6 0.5   CALCIUM 8.6* 8.3*   MG 1.7 1.6     Recent Labs   Lab 01/20/23  0543 01/21/23  0319   WBC 2.85* 2.08*   HGB 9.3* 8.9*   HCT 31.0* 29.5*   * 83*     Recent Labs   Lab 01/19/23  2253   INR 1.3*   APTT 27.6     Microbiology Results (last 7 days)       Procedure Component Value Units Date/Time    Blood culture [[id number]] Collected: 01/20/23 1131    Order Status: Completed Specimen: Blood Updated: 01/21/23 1412     Blood Culture, Routine No Growth to date      No Growth to date    Narrative:      Collection has been rescheduled by 2 at 01/20/2023 11:03 Reason:   Patient unavailable in room with doctors   Collection has been rescheduled by 2 at 01/20/2023 11:03 Reason:   Patient unavailable in room with doctors     Blood culture [[id number]] Collected: 01/20/23 1131    Order Status: Completed Specimen: Blood Updated: 01/21/23 1412     Blood Culture, Routine No Growth to date      No Growth to date    Narrative:      Collection has been rescheduled by 2 at 01/20/2023 11:03 Reason:   Patient unavailable in room with  doctors   Collection has been rescheduled by [facility] at 01/20/2023 11:03 Reason:   Patient unavailable in room with doctors     Urine culture [[id number]] Collected: 01/20/23 0130    Order Status: Completed Specimen: Urine Updated: 01/21/23 1144     Urine Culture, Routine Multiple organisms isolated. None in predominance.  Repeat if      clinically necessary.    Narrative:      Specimen Source->Urine    Blood culture [[id number]]     Order Status: Canceled Specimen: Blood     Culture, Respiratory with Gram Stain [[id number]]     Order Status: Canceled Specimen: Respiratory           All pertinent labs from the last 24 hours have been reviewed.    Significant Diagnostics:  CT C-spine:  Vertebrae: The dens, lateral masses, and occipital condyles are intact.  There is no evidence of fracture or dislocation.     Discs: Multilevel disc height loss and degenerative endplate change.  Prominent C6 inferior endplate Schmorl's node, similar in appearance dating back to MRI from 02/14/2022.     Degenerative changes: Sent spinal canal dimensions at C3-C4 are 10.5 mm, 8.5 mm at C4-C5, 10.0 mm at C5-C6, and 10.8 mm at C6-C7 .  Multilevel uncovertebral spurring with multilevel neural foraminal narrowing most pronounced at C5-C6 with moderate right neural foraminal narrowing and C6-C7 with moderate left neural foraminal narrowing.

## 2023-01-21 NOTE — PLAN OF CARE
[facility] - Neurosurgery (Hospital)  Initial Discharge Assessment       Primary Care Provider: Dannielle Merino DO    Admission Diagnosis: Lower extremity weakness [R29.898]    Admission Date: 1/19/2023  Expected Discharge Date: 1/26/2023    Discharge Barriers Identified: None    Payor: MEDICAID / Plan: AETNA [facility] / Product Type: Managed Medicaid /     Extended Emergency Contact Information  Primary Emergency Contact: Tierra Phillips  Mobile Phone: [phone]  Relation: Relative  Secondary Emergency Contact: Meggan Llamas   [place]  Mobile Phone: [phone]  Relation: Friend    Discharge Plan A: Home with family  Discharge Plan B: Home      [id number] - [place], [place] - [address]  [address]  [facility] [address]  Phone: [phone] Fax: [phone]    [id number] - [place], [place] - [address]  [address]  [facility] [address]  Phone: [phone] Fax: [phone]      Initial Assessment (most recent)       Adult Discharge Assessment - 01/20/23 1819          Discharge Assessment    Assessment Type Discharge Planning Assessment     Confirmed/corrected address, phone number and insurance Yes     Confirmed Demographics Correct on Facesheet     Source of Information patient     People in Home significant other   Minor Reynoso, boyfriend, [phone].    Do you expect to return to your current living situation? Yes     Do you have help at home or someone to help you manage your care at home? Yes     Prior to hospitilization cognitive status: Alert/Oriented     Current cognitive status: Alert/Oriented     Walking or Climbing Stairs ambulation difficulty, requires equipment     Mobility Management Cane     Dressing/Bathing dressing difficulty, assistance 1 person     Equipment Currently Used at Home cane, straight     Readmission within 30 days? No     Patient currently being followed by outpatient case management? No     Do you  currently have service(s) that help you manage your care at home? No     Do you take prescription medications? Yes     Do you have prescription coverage? Yes     Do you have any problems affording any of your prescribed medications? No     Is the patient taking medications as prescribed? yes     Who is going to help you get home at discharge? Minor Reynoso, boyfriend, [phone]     How do you get to doctors appointments? family or friend will provide     Are you on dialysis? No     Do you take coumadin? No     Discharge Plan A Home with family     Discharge Plan B Home     DME Needed Upon Discharge  none     Discharge Plan discussed with: Patient     Discharge Barriers Identified None                   The CM met with the patient at bedside to complete the DPA.  The CM placed name and contact information on the blackboard in the patient's room.  Use preferred pharmacy / bedside delivery for any necessary  medications at the time of discharge.The patient is independent with all ADLs.  The patient uses the following home equipment. Cane The patient is not on Dialysis or Coumadin. The patient's boyfriend will provide assistance to the patient upon discharge. The patient's boyfriend  will provide transportation upon discharge .  The CM will continue to follow for course of hospitalization.

## 2023-01-21 NOTE — SUBJECTIVE & OBJECTIVE
Interval History: NAEON. Patient used a few lorazepam and oxycodone tabs for ongoing anxiety and pain, requesting increased doses of pain meds today. Otherwise feels the same. Remains weak adriana in the lower extremities. Still waiting for TLSO brace.    Review of Systems   Constitutional:  Negative for fever.   Eyes:  Negative for visual disturbance.   Respiratory:  Negative for shortness of breath.    Cardiovascular:  Negative for chest pain.   Gastrointestinal:  Negative for nausea and vomiting.   Musculoskeletal:  Positive for back pain. Negative for neck pain.   Neurological:  Positive for weakness. Negative for dizziness, seizures, light-headedness, numbness and headaches.   Objective:     Vital Signs (Most Recent):  Temp: 98.4 °F (36.9 °C) (01/21/23 0837)  Pulse: 93 (01/21/23 0837)  Resp: 17 (01/21/23 0837)  BP: (!) 110/56 (01/21/23 0837)  SpO2: (!) 93 % (01/21/23 0837)   Vital Signs (24h Range):  Temp:  [97.8 °F (36.6 °C)-98.4 °F (36.9 °C)] 98.4 °F (36.9 °C)  Pulse:  [] 93  Resp:  [16-19] 17  SpO2:  [91 %-97 %] 93 %  BP: ()/(52-65) 110/56     Weight: 102.1 kg (225 lb)  Body mass index is 35.24 kg/m².    Intake/Output Summary (Last 24 hours) at 1/21/2023 1201  Last data filed at 1/21/2023 1129  Gross per 24 hour   Intake 650 ml   Output 650 ml   Net 0 ml      Physical Exam  Vitals and nursing note reviewed.   Constitutional:       General: She is not in acute distress.     Appearance: She is obese. She is not ill-appearing, toxic-appearing or diaphoretic.   HENT:      Head: Normocephalic.      Mouth/Throat:      Mouth: Mucous membranes are moist.   Eyes:      General: No scleral icterus.        Right eye: No discharge.         Left eye: No discharge.   Cardiovascular:      Rate and Rhythm: Normal rate and regular rhythm.      Heart sounds: Murmur heard.   Pulmonary:      Effort: Pulmonary effort is normal. No respiratory distress.      Breath sounds: Normal breath sounds.   Abdominal:      General:  Bowel sounds are normal.      Palpations: Abdomen is soft.      Tenderness: There is no abdominal tenderness.   Musculoskeletal:      Cervical back: No rigidity.      Right lower leg: No edema (Mild).      Left lower leg: No edema (Mild).   Skin:     General: Skin is warm and dry.      Coloration: Skin is not jaundiced.   Neurological:      Mental Status: She is alert and oriented to person, place, and time. Mental status is at baseline.      Sensory: Sensory deficit (Decreased in bilateral LE) present.      Motor: Weakness (Bilateral LE) and tremor (Bilateral UE) present.      Deep Tendon Reflexes: Reflexes abnormal. Babinski sign present on the right side. Babinski sign present on the left side.      Comments: Hyperreflexia in bilateral lower extremities   Psychiatric:         Mood and Affect: Mood normal.         Behavior: Behavior normal.       Significant Labs: All pertinent labs within the past 24 hours have been reviewed.  CBC:   Recent Labs   Lab 01/20/23  0543 01/21/23  0319   WBC 2.85* 2.08*   HGB 9.3* 8.9*   HCT 31.0* 29.5*   * 83*     CMP:   Recent Labs   Lab 01/20/23  0543 01/21/23  0319    135*   K 4.2 3.7    105   CO2 22* 24   GLU 87 74   BUN 20 16   CREATININE 0.6 0.5   CALCIUM 8.6* 8.3*   PROT 7.4 6.9   ALBUMIN 2.2* 2.0*   BILITOT 1.6* 1.6*   ALKPHOS 128 121   AST 43* 39   ALT 23 22   ANIONGAP 8 6*       Significant Imaging: I have reviewed all pertinent imaging results/findings within the past 24 hours.

## 2023-01-21 NOTE — PLAN OF CARE
Problem: Occupational Therapy  Goal: Occupational Therapy Goal  Description: Goals to be met by: 2/10     Patient will increase functional independence with ADLs by performing:    UE Dressing with Moderate Assistance.  Grooming while seated with Minimal Assistance.  Sitting at edge of bed x15 minutes with Minimal Assistance.  Rolling to Bilateral with Minimal Assistance.   Supine to sit with Moderate Assistance.    Outcome: Ongoing, Progressing     OT eval completed.

## 2023-01-21 NOTE — PLAN OF CARE
POC established and functional mobility goals were created to help pt return to PLOF. Will be reassessed as appropriate to measure pt progress.    Problem: Physical Therapy  Goal: Physical Therapy Goal  Description: Goals to be met by: 23     Patient will increase functional independence with mobility by performin. Supine to sit with Moderate Assistance  2. Sit to supine with Moderate Assistance  3. Rolling to Left and Right with Moderate Assistance.  4. Sit to stand transfer with Maximum Assistance    Outcome: Ongoing, Progressing

## 2023-01-21 NOTE — PROGRESS NOTES
[facility] - Neurosurgery ([facility]  [facility] Medicine  Progress Note    Patient Name: Rachel Zazueta  MRN: [id number]  Patient Class: IP- Inpatient   Admission Date: 1/19/2023  Length of Stay: 2 days  Attending Physician: Oliver Méndez MD  Primary Care Provider: Dannielle Merino DO        Subjective:     Principal Problem:Weakness of both lower extremities        HPI:  Ms. Zazueta is a 42 y.o F w/ hx ofIV drug use (methamphetamine), chronic HCV with cirrhosis, spinal fusion (04/2021), epidural abscess with removal of hardware (02/2022), spinal fusion with hardware (T10 - Pelvis / 03/2022), obesity (BMI 39.3) and neurogenic bladder who initially presented to [facility] for evaluation of back pain and left leg weakness.  She reports initially noting the left leg weakness when she was about to go to the bathroom and was about to fall but was assisted by her boyfriend.  She was brought to the ED via EMS.  Urinalysis with UTI concerns and blood cultures at OSH grew ESBL E coli so she was treated with meropenem.  During hospitalization, she reports the weakness that started out in her left leg initially then spread upwards to her left thigh, left hip, then crossed over to the right hip and spread to her right leg.  Denies recent IV drug use. She reports her last incidence of drug use was more than 3 years ago and also denies tobacco, and alcohol abuse.  Reports cannabis use.    She also reports more than 10 years ago she had an episode of a headache that lasted about 4 days and was associated with residual defects of a strabismus in the left eye with associated visual disturbances.  She also reports over the past few years her friends have noticed that she sometimes has word-finding difficulty during conversations.     Reports shortness of breath when sitting up, fever, chills, back pain, lower extremity weakness(initally L>R, but now R>L), diarrhea.  Denies cough, nausea, vomiting, constipation, bladder or  bowel incontinence, dysuria, dark urine, new vision changes.    OSH course notable for concerning urinalysis and blood cultures positive for ESBL E coli treated with meropenem.  She was also admit to the ICU where she was treated with Precedex for significant body aches, irritability, and muscle spasms.  Concerns of a murmur on physical exam so she underwent TTE which did not show any vegetations.  Worsening lower extremity weakness workup with MRI head nonspecific, MRI cervical spine with multilevel spondylosis, X-ray spine with multilevel thoracolumbar fusion and diskectomy, focal kyphosis at T9-10 increased compared to prior.  She was started on prophylaxis amoxicillin due to her history of infected hardware.  MRI spine unable to be completed due to patient's body habitus so she was transferred to [facility] for further imaging and Neurosurgery, Neurology evaluation.      Overview/Hospital Course:  Pt admitted as a transfer from [facility] for worsening LE weakness, concern for spinal infection, and need for MRI which could not be done at OSH. Imaging was completed here. Worsening proximal junctional kyphosis noted at T9-10 noted with concern for discitis at T8-9, T9-10. NSGY evaluated. Plan for OR Tuesday 1/24 for hardware removal, washout, and re-instrumentation/extension of fusion to T4.      Interval History: NAEON. Patient used a few lorazepam and oxycodone tabs for ongoing anxiety and pain, requesting increased doses of pain meds today. Otherwise feels the same. Remains weak adriana in the lower extremities. Still waiting for TLSO brace.    Review of Systems   Constitutional:  Negative for fever.   Eyes:  Negative for visual disturbance.   Respiratory:  Negative for shortness of breath.    Cardiovascular:  Negative for chest pain.   Gastrointestinal:  Negative for nausea and vomiting.   Musculoskeletal:  Positive for back pain. Negative for neck pain.   Neurological:  Positive for weakness. Negative for dizziness,  seizures, light-headedness, numbness and headaches.   Objective:     Vital Signs (Most Recent):  Temp: 98.4 °F (36.9 °C) (01/21/23 0837)  Pulse: 93 (01/21/23 0837)  Resp: 17 (01/21/23 0837)  BP: (!) 110/56 (01/21/23 0837)  SpO2: (!) 93 % (01/21/23 0837)   Vital Signs (24h Range):  Temp:  [97.8 °F (36.6 °C)-98.4 °F (36.9 °C)] 98.4 °F (36.9 °C)  Pulse:  [] 93  Resp:  [16-19] 17  SpO2:  [91 %-97 %] 93 %  BP: ()/(52-65) 110/56     Weight: 102.1 kg (225 lb)  Body mass index is 35.24 kg/m².    Intake/Output Summary (Last 24 hours) at 1/21/2023 1201  Last data filed at 1/21/2023 1129  Gross per 24 hour   Intake 650 ml   Output 650 ml   Net 0 ml      Physical Exam  Vitals and nursing note reviewed.   Constitutional:       General: She is not in acute distress.     Appearance: She is obese. She is not ill-appearing, toxic-appearing or diaphoretic.   HENT:      Head: Normocephalic.      Mouth/Throat:      Mouth: Mucous membranes are moist.   Eyes:      General: No scleral icterus.        Right eye: No discharge.         Left eye: No discharge.   Cardiovascular:      Rate and Rhythm: Normal rate and regular rhythm.      Heart sounds: Murmur heard.   Pulmonary:      Effort: Pulmonary effort is normal. No respiratory distress.      Breath sounds: Normal breath sounds.   Abdominal:      General: Bowel sounds are normal.      Palpations: Abdomen is soft.      Tenderness: There is no abdominal tenderness.   Musculoskeletal:      Cervical back: No rigidity.      Right lower leg: No edema (Mild).      Left lower leg: No edema (Mild).   Skin:     General: Skin is warm and dry.      Coloration: Skin is not jaundiced.   Neurological:      Mental Status: She is alert and oriented to person, place, and time. Mental status is at baseline.      Sensory: Sensory deficit (Decreased in bilateral LE) present.      Motor: Weakness (Bilateral LE) and tremor (Bilateral UE) present.      Deep Tendon Reflexes: Reflexes abnormal. Babinski  sign present on the right side. Babinski sign present on the left side.      Comments: Hyperreflexia in bilateral lower extremities   Psychiatric:         Mood and Affect: Mood normal.         Behavior: Behavior normal.       Significant Labs: All pertinent labs within the past 24 hours have been reviewed.  CBC:   Recent Labs   Lab 01/20/23  0543 01/21/23 0319   WBC 2.85* 2.08*   HGB 9.3* 8.9*   HCT 31.0* 29.5*   * 83*     CMP:   Recent Labs   Lab 01/20/23  0543 01/21/23 0319    135*   K 4.2 3.7    105   CO2 22* 24   GLU 87 74   BUN 20 16   CREATININE 0.6 0.5   CALCIUM 8.6* 8.3*   PROT 7.4 6.9   ALBUMIN 2.2* 2.0*   BILITOT 1.6* 1.6*   ALKPHOS 128 121   AST 43* 39   ALT 23 22   ANIONGAP 8 6*       Significant Imaging: I have reviewed all pertinent imaging results/findings within the past 24 hours.      Assessment/Plan:      * Weakness of both lower extremities  42 y.o F w/ hx of IV drug use (methamphetamine), chronic HCV with cirrhosis, spinal fusion (04/2021), epidural abscess with removal of hardware (02/2022), spinal fusion with hardware (T10 - Pelvis / 03/2022), obesity (BMI 39.3) and neurogenic bladder who initially presented to [facility] for evaluation of back pain and left leg weakness.  She reports initially noting the left leg weakness when she was about to go to the bathroom and was about to fall but was assisted by her boyfriend.   During hospitalization, she reports the weakness that started out in her left leg initially then spread upwards to her left thigh, left hip, then crossed over to the right hip and spread to her right leg.  Denies recent IV drug use. She reports her last incidence of drug use was more than 3 years ago and also denies tobacco, and alcohol abuse.  Reports cannabis use. back pain, lower extremity weakness(initally L>R, but now R>L), diarrhea.  Denies constipation, bladder or bowel incontinence, dysuria, dark urine. On further chart review, OSH records  show that pt relapsed to IVDU prior to admission.    OSH evaluation for weakness  MRI brain: Nonspecific gliotic white matter signal change within bilateral cerebral hemispheres similar in extent and distribution greater than expected for patient age.  This may be related to chronic small vessel ischemia.  Numerous chronic systemic illnesses can result in this appearance. Demyelinating disease is also in the differential.  MRI cervical spine: C3-C7 multilevel spondylosis  MRI spine unable to be performed due to body habitus    Transfer to [facility] for neurosurgery evaluation, neurology evaluation and MRI    Differential diagnoses include, but not limited to:  - Multiple Sclerosis(history of past neurologic deficits, MRI brain with demyelinating disease concerns)  - Guillan [place] Syndrome(recent UTI infection, ascending pattern of weakness)  - Inflammatory myopathy(physical exam with proximal LE muscle weakness)  - Infected hardware vs. Spinal abscess(hx of infected hardware, IVDU is a risk factor)  - Central cord syndrome(less likely as weakness in LE>UE which would be atypical)   - Most likely etiology is infected hardware and discitis + kyphosis of thoracolumbar spine. Finished course of Meropenem at OSH for ESBL bacteremia, with repeat BC NGTD.    PLAN  - NSGY following. To OR for hardware removal, washout, and re-instrumentation/extension of fusion to T4 on 1/24/23  - ID following  - Repeat UA dirty, urine culture pending  - Repeat BCx pending  - Patient stable, not septic. Continue prophylaxis amoxicillin due to history of infected hardware.      Kyphosis of thoracolumbar region  See weakness    Thoracic discitis  See weakness    Anxiety and depression  Recently evaluated by psychiatry at OSH     PLAN  - Continue Lorazepam PRN anxiety  - Depression- Bupropion 300 mg XR qd  - Anxiety-Gabapentin off-label 400 mg TID    Substance use disorder  Denies recent IV drug use. She reports her last incidence of drug use was  "more than 3 years ago and also denies tobacco, and alcohol abuse. Reports cannabis use.   OSH records however note that she relapsed prior to admission. Was being followed by Psych.    Chronic hepatitis C with cirrhosis  See "Cirrhosis of liver with ascites" A&P    Cirrhosis of liver with ascites  Chronic. Hx of Hepatitis C. Transaminitis present as far back as 2015 on labs.  RUQ U/S 02/2022: Hepatic cirrhosis and portal hypertension with satisfactory Doppler evaluation of the liver.-  Follows with GI outpatient with most recent visit in 05/2022 where she refused transplant consideration.  Patient reported shortness of breath exacerbated by sitting up, concerning for platypnea.   Repeat with possible portopulmonary hypertension, extracardiac shunting on bubble study.     MELD-Na score: 11 at 1/21/2023  3:19 AM  MELD score: 11 at 1/21/2023  3:19 AM  Calculated from:  Serum Creatinine: 0.5 mg/dL (Using min of 1 mg/dL) at 1/21/2023  3:19 AM  Serum Sodium: 135 mmol/L at 1/21/2023  3:19 AM  Total Bilirubin: 1.6 mg/dL at 1/21/2023  3:19 AM  INR(ratio): 1.3 at 1/19/2023 10:53 PM  Age: 42 years    PLAN  - Trend transaminitis on CMP  - Continue lactulose  - Patient overdue for GI follow-up for hep C treatment.  on outpatient followup at discharge    VTE Risk Mitigation (From admission, onward)         Ordered     enoxaparin injection 40 mg  Daily         01/21/23 0733     IP VTE HIGH RISK PATIENT  Once         01/19/23 2153     Place sequential compression device  Until discontinued         01/19/23 2153     Reason for No Pharmacological VTE Prophylaxis  Once        Question:  Reasons:  Answer:  Physician Provided (leave comment)  Comment:  Potential NSGY procedure    01/19/23 2153                Discharge Planning   SHIRA: 1/26/2023     Code Status: Full Code   Is the patient medically ready for discharge?: No    Reason for patient still in hospital (select all that apply): Patient trending condition and Consult " recommendations  Discharge Plan A: Home with family                  Leonora Acosta MD  Department of Hospital Medicine   [facility] - Neurosurgery ([facility])

## 2023-01-21 NOTE — PT/OT/SLP EVAL
Occupational Therapy   Co-Evaluation    Name: Rachel Zazueta  MRN: [id number]  Admitting Diagnosis: Weakness of both lower extremities    Recommendations:     Discharge Recommendations: nursing facility, skilled  Discharge Equipment Recommendations:   (TBD)  Barriers to discharge:  Inaccessible home environment, Decreased caregiver support (increased (A) required)    Assessment:     Rachel Zazueta is a 42 y.o. female with a medical diagnosis of Weakness of both lower extremities.  She presents with decreased independence with all ADL's & mobility.  Pt with significant limitation due to pain from spasms. Co-evaluation/treatment performed due to patient's multiple deficits and pain requiring two skilled therapists to safely assess patient's ability to complete ADLs and functional mobility in addition to accommodating for patient's activity tolerance while in acute care setting.   Performance deficits affecting function: weakness, impaired endurance, impaired self care skills, impaired functional mobility, impaired balance, decreased upper extremity function, decreased lower extremity function, decreased safety awareness, orthopedic precautions, pain.      Rehab Prognosis: Fair; patient would benefit from acute skilled OT services to address these deficits and reach maximum level of function.       Plan:     Patient to be seen 3 x/week to address the above listed problems via self-care/home management, therapeutic activities, therapeutic exercises, neuromuscular re-education  Plan of Care Expires: 02/20/23  Plan of Care Reviewed with: patient    Subjective     Chief Complaint: Pt reported that she has been having bad back spasms.  Patient/Family Comments/goals: to have decreased pain.    Occupational Profile:  Living Environment: Pt reports that currently she resides with a room-mate in a mobile home with 5.5 steps (B) rails to enter.  Pt reported that she is not sure if she will be returning to this home & if she  does not she does not know where she will be residing after discharge.  Pt reports independence with ADL's & ambulation. Pt has a walk-in shower for bathing.  Pt does not drive & has been trying to get on disability.  Pt enjoys fishing & art however recently has felt depressed & decreased motivation to participate in leisure activities.  Equipment Used at Home: bedside commode, walker, rolling, rollator, cane, straight, raised toilet (adjustable bed)  Assistance upon Discharge: unknown    Pain/Comfort:  Pain Rating 1: 9/10  Location 1: back (pt reported spasms in back)  Pain Addressed 1: Reposition, Pre-medicate for activity, Distraction, Cessation of Activity, Nurse notified  Pain Rating Post-Intervention 1: 10/10    Patients cultural, spiritual, Bahai conflicts given the current situation:      Objective:     Communicated with: RN prior to session.  Patient found supine with PureWick, bed alarm (no family present) upon OT entry to room.    General Precautions: Standard, fall, contact  Orthopedic Precautions: spinal precautions  Braces: TLSO (must wear TLSO for mobility)  Respiratory Status: Room air    Occupational Performance:    Bed Mobility:    Patient completed Rolling/Turning to Right with maximal assistance of 2 persons x 3 trials  Patient completed Scooting/Bridging with total assistance and 2 persons up HOB while supine  Patient completed Supine to Sit with total assistance and 2 persons however unable to achieve more than 1/2 of supine to sit due to continuous back muscle spasms during supine to sit  Patient completed Sit to Supine with total assistance and 2 persons      Activities of Daily Living:  Grooming: set up (A) while supine (unable to perform at EOB due to pt unable to achieve EOB sitting due to muscle spasms in back      Cognitive/Visual Perceptual:  Cognitive/Psychosocial Skills:     -       Oriented to: Person, Place, Time, and Situation   -       Follows Commands/attention:Follows  multistep  commands  -       Safety awareness/insight to disability: intact     Physical Exam:  Sensation:    -       Intact  Dominant hand:    -       right  Upper Extremity Range of Motion:  BUE WFL  Upper Extremity Strength: BUE WFL   Strength: BUE WFL    AMPAC 6 Click ADL:  AMPAC Total Score: 12    Treatment & Education:  Provided coaching for deep breathing to work through pain from muscle spasms as well as anxiety regarding transitioning to EOB.  Provided education on safety & pacing of activities.  Provided education on need for TLSO with EOB activities.  Provided education regarding role of OT, POC, & discharge recommendations with pt verbalizing understanding.  Pt had no further questions & when asked whether there were any concerns pt reported none.      Patient left supine with all lines intact, call button in reach, bed alarm on, and RN notified    GOALS:   Multidisciplinary Problems       Occupational Therapy Goals          Problem: Occupational Therapy    Goal Priority Disciplines Outcome Interventions   Occupational Therapy Goal     OT, PT/OT Ongoing, Progressing    Description: Goals to be met by: 2/10     Patient will increase functional independence with ADLs by performing:    UE Dressing with Moderate Assistance.  Grooming while seated with Minimal Assistance.  Sitting at edge of bed x15 minutes with Minimal Assistance.  Rolling to Bilateral with Minimal Assistance.   Supine to sit with Moderate Assistance.                         History:     Past Medical History:   Diagnosis Date    Anemia     Anxiety     Depressed     Diskitis     Hep C w/o coma, chronic     IV drug abuse     Kidney stone     Liver cirrhosis          Past Surgical History:   Procedure Laterality Date    ARTHROTOMY OF SHOULDER Left 11/15/2022    Procedure: ARTHROTOMY, SHOULDER;  Surgeon: Bjorn Hayes MD;  Location: [facility];  Service: Orthopedics;  Laterality: Left;  Left acromioclavicular joint arthrotomy    BACK SURGERY       benine tumor removal      forehead, age 9    CHOLECYSTECTOMY      KIDNEY SURGERY      LUMBAR FUSION Bilateral 3/2/2022    Procedure: FUSION, SPINE, LUMBAR;  Surgeon: Guille Templeton MD;  Location: [facility] OR [address] [place] [address];  Service: Neurosurgery;  Laterality: Bilateral;  AIRO  T10--Pelvis    REMOVAL OF HARDWARE FROM SPINE N/A 2/21/2022    Procedure: REMOVAL, HARDWARE, SPINE;  Surgeon: Guille Templeton MD;  Location: [facility] OR [address] [place] [address];  Service: Neurosurgery;  Laterality: N/A;  [place]    SPINE SURGERY         Time Tracking:     OT Date of Treatment: 01/21/23  OT Start Time: 1310  OT Stop Time: 1342  OT Total Time (min): 32 min    Billable Minutes:Evaluation 20  Self Care/Home Management 12    1/21/2023

## 2023-01-21 NOTE — HOSPITAL COURSE
Pt admitted as a transfer from [facility] for worsening LE weakness, concern for spinal infection, and need for MRI which could not be done at OSH. Imaging was completed at this facility.  Admitted to neuro critical Care with assistance of Neurosurgery.  Worsening proximal junctional kyphosis noted at T9-10 with concern for discitis at T8-9, T9-10. NSGY evaluated. Found to have T8-T10 kyphosis on MRI. 1/24 underwent laminectomy T8-T10; posterior fusion T8-T12; and washout. 2/2 underwent T4-pelvis fusion and T9-T10 corpectomy.  ID consulted for thoracic discitis infection.  Patient to complete 8 weeks of therapy with meropenem with surgical cultures positive for ESBL E coli. Patient underwent flap closure and drain placed with Plastic surgery.  Step-down to Hospital Medicine on 02/08.  Plastic surgery continued following for drain management.  Patient with significant ascites and has had several paracenteses.  Ascitic fluid analysis revealed chylous ascites and IR was consulted to evaluate for possible lymphangiogram which was ultimately deemed unnecessary.  Nephrology consulted for assistance with hyponatremia and diuresis as well as persistent hypotension.  Patient was started on several days of albumin and ultimately sodium and blood pressure stabilized and patient was able to be started on p.o. Bumex with continued adequate urine output.  Last paracentesis on 02/15/2023 and patient has not experienced significant abdominal distention since then.  Body fluid cultures remained negative.  2/23, patient with hemoglobin down to 6.7 requiring 1 unit PRBC transfusion with Bumex.  Hemoglobin remained stable following transfusion.  On 02/24, patient developed new cough and some congestion, testing positive for COVID.  Discussed with patient and remdesivir initiated.  Plan for discharge to LTAC following suture removal which was completed 2/25.  Discussed with Plastic surgery. At this time, patient is stable for discharge to  LTAC with drains in place when authorization is obtained.  Follow-up appointment has been set with Plastic surgery on 03/08/2023 for further drain management.    The patient's chronic medical conditions were managed appropriately. Discharge plan of care was discussed at length with patient including patient's need for close outpatient follow-up once discharged from LTAC facility.  Patient will need follow-up with PCP, plastic surgery, neuro surgery, hepatology, and Infectious Disease.

## 2023-01-21 NOTE — ASSESSMENT & PLAN NOTE
Chronic. Hx of Hepatitis C. Transaminitis present as far back as 2015 on labs.  RUQ U/S 02/2022: Hepatic cirrhosis and portal hypertension with satisfactory Doppler evaluation of the liver.-  Follows with GI outpatient with most recent visit in 05/2022 where she refused transplant consideration.  Patient reported shortness of breath exacerbated by sitting up, concerning for platypnea.   Repeat with possible portopulmonary hypertension, extracardiac shunting on bubble study.     MELD-Na score: 11 at 1/21/2023  3:19 AM  MELD score: 11 at 1/21/2023  3:19 AM  Calculated from:  Serum Creatinine: 0.5 mg/dL (Using min of 1 mg/dL) at 1/21/2023  3:19 AM  Serum Sodium: 135 mmol/L at 1/21/2023  3:19 AM  Total Bilirubin: 1.6 mg/dL at 1/21/2023  3:19 AM  INR(ratio): 1.3 at 1/19/2023 10:53 PM  Age: 42 years    PLAN  - Trend transaminitis on CMP  - Continue lactulose  - Patient overdue for GI follow-up for hep C treatment.  on outpatient followup at discharge

## 2023-01-21 NOTE — HOSPITAL COURSE
1/21: NAEON. AFVSS. Pt continues to complain of severe back pain. Plts 83. Bcx NGTD. CT C spine with concern for C6-7 discitis, flex ex with limited view. ESR, CRP elevated from prior. CT T/L spine with haloing of pelvic and T10 screws, spondylodiscitis at T8-9, T9-10. MRI C/T/L again shows focal kyphosis at T8-10 with severe anterior height loss at T9, enhancing C6 inf endplate Schmorls node, C7 SP enhancement. ID consulted, continue amoxicillin.   1/22 naeon, plan for OR Tuesday 1/25: OR yesterday for T6-12 PSIF, keke pus noted. Improved strength in lower extremities this morning. No fevers. OR cultures pending   1/26  pending OR on Wednesday for stage 2. Transitioning of PCA  to oral meds.   1/27: SAMRA, exam stable. Stage 2 OR date changed to Thursday 2/2 1/28: neuro stable. OR Thursday with nsgy for T9-T10 corpectomy and plastics closure. HV in place, Hb low 7s.  1/29: neuro stable. Drains in place. OR Thursday pending  1/30: MATEO CABRALES  1/31: MATEO CABRALES, neuro stable.   2/1: MATEO CABRALES, drain ouput 50 and 10. Hb 7.1, Plt 67 today. Needs platelet transfusion for ptl goal > 100 prior to OR  2/2: Or today. 2 more packs platelets given today and ddavp.   2/4: SAMRA, platelets improved to 195, drain output decreasing. Strength stable. MAP > 85  2/5: last day of MAPs. Drains with fairly high output overall, Hb stable. Platelets 178.   2/6: MAP goals complete, drain output with slight increase today. Hb and platelets stable.

## 2023-01-21 NOTE — ASSESSMENT & PLAN NOTE
Denies recent IV drug use. She reports her last incidence of drug use was more than 3 years ago and also denies tobacco, and alcohol abuse. Reports cannabis use.   OSH records however note that she relapsed prior to admission. Was being followed by Psych.

## 2023-01-21 NOTE — CONSULTS
[facility] - Neurosurgery [facility])  Infectious Disease  Consult Note    Patient Name: Rachel Zazueta  MRN: [id number]  Admission Date: 1/19/2023  Hospital Length of Stay: 1 days  Attending Physician: Oliver Méndez MD  Primary Care Provider: Dannielle Merino DO     Inpatient consult to Infectious Diseases  Consult performed by: ROSA ELENA Mckeon Jr.  Consult ordered by: Carlin Quach MD      Consult received.  Patient off floor in CT at time attempted to see.    Chart reviewed   Source of prior ESBL E coli bacteremia unclear but treated and resolved.  Worsening spinal stenosis with possible cord change.  OR planned for next week    Rec: Remain on amoxacillin for now.      Full consult to follow once seen.    ROSA ELENA Marinelli  Infectious Disease  [facility] - Neurosurgery [facility])

## 2023-01-21 NOTE — PROGRESS NOTES
[facility] - [facility] ([facility])  Neurosurgery  Progress Note    Subjective:     History of Present Illness: Ms. Zazueta is a 42 y.o F w/ hx ofIV drug use (methamphetamine), chronic HCV with cirrhosis, spinal fusion (04/2021), epidural abscess with removal of hardware (02/2022), spinal fusion with hardware (T10 - Pelvis / 03/2022), obesity (BMI 39.3) and neurogenic bladder and recent shoulder surgery who initially presented to [facility] for evaluation of back pain and left leg weakness.  She reports initially noting the left leg weakness when she was about to go to the bathroom and was about to fall but was assisted by her boyfriend.  She was brought to the ED via EMS.  Urinalysis with UTI concerns and blood cultures at OSH grew ESBL E coli, and shoulder effusion concern for infection so she was treated with meropenem.  During hospitalization, she reports the weakness that started out in her left leg initially then spread upwards to her left thigh, left hip, then crossed over to the right hip and spread to her right leg.  Denies recent IV drug use. She reports her last incidence of drug use was more than 3 years ago and also denies tobacco, and alcohol abuse.  Reports cannabis use.     OSH course notable for concerning urinalysis and blood cultures positive for ESBL E coli treated with meropenem.  She was also admit to the ICU where she was treated with Precedex for significant body aches, irritability, and muscle spasms.  Concerns of a murmur on physical exam so she underwent TTE which did not show any vegetations.  Worsening lower extremity weakness workup with MRI head nonspecific, MRI cervical spine with multilevel spondylosis, X-ray spine with multilevel thoracolumbar fusion and diskectomy, focal kyphosis at T9-10 increased compared to prior.  She was started on prophylaxis amoxicillin due to her history of infected hardware.  MRI spine unable to be completed due to patient's body habitus so she was  transferred [place] [facility] [place] and Neurosurgery, Neurology evaluation.      Post-Op Info:  Procedure(s) (LRB):  **AIRO** Revision fusion, spine T4-Pelvis, T9 corpectomy, AIRO, Carmen, Globus, neuromonitoring (N/A)         Interval History: 1/21: NAEON. AFVSS. Plts 83. Bcx NGTD. CT C spine with concern for C6-7 discitis, flex ex with limited view.    Medications:  Continuous Infusions:  Scheduled Meds:   amoxicillin  1,000 mg Oral Q12H    buPROPion  300 mg Oral Daily    enoxaparin  40 mg Subcutaneous Daily    gabapentin  400 mg Oral TID    lactulose  20 g Oral TID    pantoprazole  40 mg Oral Daily    polyethylene glycol  17 g Oral Daily    potassium chloride  20 mEq Oral Daily     PRN Meds:acetaminophen, dextrose 10%, dextrose 10%, glucagon (human recombinant), glucose, glucose, HYDROcodone-acetaminophen, LORazepam, magnesium hydroxide 400 mg/5 ml, melatonin, methocarbamoL, naloxone, promethazine, sodium chloride 0.9%     Review of Systems  Objective:     Weight: 102.1 kg (225 lb)  Body mass index is 35.24 kg/m².  Vital Signs (Most Recent):  Temp: 98.6 °F (37 °C) (01/21/23 1612)  Pulse: 95 (01/21/23 1612)  Resp: 18 (01/21/23 1641)  BP: 117/64 (01/21/23 1612)  SpO2: 97 % (01/21/23 1612) Vital Signs (24h Range):  Temp:  [97.8 °F (36.6 °C)-98.6 °F (37 °C)] 98.6 °F (37 °C)  Pulse:  [] 95  Resp:  [16-19] 18  SpO2:  [91 %-97 %] 97 %  BP: ()/(52-65) 117/64     Date 01/21/23 0700 - 01/22/23 0659   Shift 8144-6252 4115-0349 2100-1640 24 Hour Total   INTAKE   P.O. 400   400   Shift Total(mL/kg) 400(3.9)   400(3.9)   OUTPUT   Shift Total(mL/kg)       Weight (kg) 102.1 102.1 102.1 102.1                          Neurosurgery Physical Exam  General: well developed, well nourished, no distress.   Head: normocephalic, atraumatic  Neurologic: Alert and oriented. Thought content appropriate.  GCS: Motor: 6/Verbal: 5/Eyes: 4 GCS Total: 15  Mental Status: Awake, Alert, Oriented x 4  Language: No  aphasia  Speech: No dysarthria  Cranial nerves: face symmetric, tongue midline, CN II-XII grossly intact.   Eyes: pupils equal, round, reactive to light with accommodation, EOMI, nystagmus.   Pulmonary: normal respirations, no signs of respiratory distress  Abdomen: soft, non-distended, not tender to palpation  Skin: Skin is warm, dry and intact.  Sensory: intact to light touch throughout     Motor Strength:Moves all extremities spontaneously with good tone.  Full strength upper and lower extremities. No abnormal movements seen.      Strength   Deltoids Triceps Biceps Wrist Extension Wrist Flexion Hand    Upper: R 5/5 5/5 5/5 5/5 5/5 5/5     L 5/5 5/5 5/5 5/5 5/5 5/5       Iliopsoas Quadriceps Knee  Flexion Tibialis  anterior Gastro- cnemius EHL   Lower: R 2/5 1/5 2/5 1/5 1/5 1/5     L 2/5 2/5 1/5 1/5 1/5 1/5      Reflexes:   DTR: 2+ symmetrically throughout.  Parker's: Negative.  Babinski's: Negative.  Clonus: 2 beats bilateral lower extremity     Incision well healed       Significant Labs:  Recent Labs   Lab 01/20/23  0543 01/21/23  0319   GLU 87 74    135*   K 4.2 3.7    105   CO2 22* 24   BUN 20 16   CREATININE 0.6 0.5   CALCIUM 8.6* 8.3*   MG 1.7 1.6     Recent Labs   Lab 01/20/23  0543 01/21/23  0319   WBC 2.85* 2.08*   HGB 9.3* 8.9*   HCT 31.0* 29.5*   * 83*     Recent Labs   Lab 01/19/23  2253   INR 1.3*   APTT 27.6     Microbiology Results (last 7 days)       Procedure Component Value Units Date/Time    Blood culture [[id number]] Collected: 01/20/23 1131    Order Status: Completed Specimen: Blood Updated: 01/21/23 1412     Blood Culture, Routine No Growth to date      No Growth to date    Narrative:      Collection has been rescheduled by [facility] at 01/20/2023 11:03 Reason:   Patient unavailable in room with doctors   Collection has been rescheduled by 2 at 01/20/2023 11:03 Reason:   Patient unavailable in room with doctors     Blood culture [[id number]] Collected: 01/20/23 1139     Order Status: Completed Specimen: Blood Updated: 01/21/23 1412     Blood Culture, Routine No Growth to date      No Growth to date    Narrative:      Collection has been rescheduled by [facility] at 01/20/2023 11:03 Reason:   Patient unavailable in room with doctors   Collection has been rescheduled by [facility] at 01/20/2023 11:03 Reason:   Patient unavailable in room with doctors     Urine culture [[id number]] Collected: 01/20/23 0130    Order Status: Completed Specimen: Urine Updated: 01/21/23 1144     Urine Culture, Routine Multiple organisms isolated. None in predominance.  Repeat if      clinically necessary.    Narrative:      Specimen Source->Urine    Blood culture [[id number]]     Order Status: Canceled Specimen: Blood     Culture, Respiratory with Gram Stain [[id number]]     Order Status: Canceled Specimen: Respiratory           All pertinent labs from the last 24 hours have been reviewed.    Significant Diagnostics:  CT C-spine:  Vertebrae: The dens, lateral masses, and occipital condyles are intact.  There is no evidence of fracture or dislocation.     Discs: Multilevel disc height loss and degenerative endplate change.  Prominent C6 inferior endplate Schmorl's node, similar in appearance dating back to MRI from 02/14/2022.     Degenerative changes: Sent spinal canal dimensions at C3-C4 are 10.5 mm, 8.5 mm at C4-C5, 10.0 mm at C5-C6, and 10.8 mm at C6-C7 .  Multilevel uncovertebral spurring with multilevel neural foraminal narrowing most pronounced at C5-C6 with moderate right neural foraminal narrowing and C6-C7 with moderate left neural foraminal narrowing.    Assessment/Plan:     * Weakness of both lower extremities  Pt is 42F multiple spinal surgeries and revisions last T10- Pelvis in March 2022 who presented to OSH with concern for shoulder infection and UTI found to have E coli sepsis who has had progressive worsening BLE weakness, XR showed possible worsening proximal junctional kyphotic deformity. Transferred to  [facility] to  and neurosurgery was consulted    Plan:  Admitted to  floor status  -- MRI C/T/L spine 1/20 with C7 SP enhancement, focal kyphosis at T8-10 with severe anterior height loss at T9, enhancing C6 inferior endplate Schmorl's node  -- CT T/L spine thin cut 1/20 with haloing of pelvic and T10 screws, spondylodiscitis T8-9, T9-10  -- CT C spine with concern for discitis at C6-7  -- flex ext XR done, limited view   -- Continue amoxicillin at this time, f/u further ID recs. BCx NGTD  -- 1/20 ESR 70, CRP 9.4. elevated from prior  -- Booked for Tuesday for T4-Pelvis revision extension, T9 corpectomy  -- TLSO at bedside  -- multimodal pain control per primary medicine team  NSGY will continue to follow. Please contact team with any further questions.        Rachel Moore MD  Neurosurgery  [facility] - Neurosurgery ([facility])

## 2023-01-21 NOTE — ASSESSMENT & PLAN NOTE
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 Transferred to C to  and neurosurgery was consulted    Plan:  Admitted to  floor status  -- MRI C/T/L spine 1/20 with C7 SP enhancement, focal kyphosis at T8-10 with severe anterior height loss at T9, enhancing C6 inferior endplate Schmorl's node  -- CT T/L spine thin cut 1/20 with haloing of pelvic and T10 screws, spondylodiscitis T8-9, T9-10  -- CT C spine with concern for discitis at C6-7  -- flex ext XR done, limited view   -- Continue amoxicillin at this time, f/u further ID recs. BCx NGTD  -- 1/20 ESR 70, CRP 9.4. elevated from prior  -- Booked for Tuesday for T4-Pelvis revision extension, T9 corpectomy  -- TLSO at bedside  -- multimodal pain control per primary medicine team  NSGY will continue to follow. Please contact team with any further questions.

## 2023-01-21 NOTE — PT/OT/SLP EVAL
"Physical Therapy Co-Evaluation and Co-Treatment with OT    Patient Name:  Rachel Zazueta   MRN:  [id number]    Recent Surgery: Procedure(s) (LRB):  **AIRO** Revision fusion, spine T4-Pelvis, T9 corpectomy, AIRO, Carmen, Globus, neuromonitoring (N/A)      Patient required co-tx with OT secondary to need for multiple set of skilled hands to provide safest therapy and best outcomes.      Recommendations:     Discharge Recommendations:  nursing facility, skilled   Discharge Equipment Recommendations: wheelchair (tbd @ SNF)   Barriers to discharge: Increased level of assist, Inaccessible home, and Decreased caregiver support    Highest Level of Mobility: Rolling to R  Assistance Required: Max(A)X2 persons    Assessment:     Rachel Zazueta is a 42 y.o. female admitted with a medical diagnosis of Weakness of both lower extremities. She presents with the following impairments/functional limitations:  weakness, impaired endurance, decreased ROM, impaired sensation, orthopedic precautions, impaired self care skills, decreased lower extremity function, impaired functional mobility, decreased safety awareness, impaired balance, impaired cardiopulmonary response to activity, pain    Pt met with HOB flat and agreeable to PT session. Pt reports her PLOF is (I) with functional mobility and ADLs using no DME. Pt currently has limited volitional movement in B LEs and impaired sensation from ~T9 level distally. Pt is limited by severe back pain during session and was not able to tolerate supine to sit. Pt was assisted with rolling to the R, B LEs were lowered from bed, then pt shouted, "stop!!! I can't!" Supine to sit was deferred for this reason.     Pt would benefit from continued skilled acute PT 3x/wk to address above listed functional deficits, provide patient/caregiver education, reduce fall risk, and maximize (I) and safety with functional mobility. After hospital discharge, pt would benefit from SNF to maximize rehab " "potential.    Rehab Prognosis: Fair; patient would benefit from acute skilled PT services to address these deficits and reach maximum level of function.      Plan:     During this hospitalization, patient to be seen 3 x/week to address the identified rehab impairments via therapeutic activities, therapeutic exercises, neuromuscular re-education, wheelchair management/training and progress toward the following goals:    Plan of Care Expires:  02/21/23    This plan of care has been discussed with the patient/caregiver, who was included in its development and is in agreement with the identified goals and treatment plan.     Subjective     Communicated with RN prior to session.  Patient agreeable to participate.     Chief Complaint: B LE weakness  Patient/Family Comments/goals: "It hurts too bad, I can't"    Pain/Comfort:  Pain Rating 1: 9/10  Location - Side 1: Bilateral  Location - Orientation 1: generalized  Location 1: back  Pain Addressed 1: Reposition, Distraction, Pre-medicate for activity, Cessation of Activity, Nurse notified  Pain Rating Post-Intervention 1: 10/10    Patients cultural, spiritual, Amish conflicts given the current situation: no    Patient's living environment is as follows:  Living Environment: Pt lives with roommate in mobile home with 5 JUNO, B HRs. Bathroom set-up: walk-in shower  *Pt does not know if she will be returning there after d/c  Prior Level of Function: independent with mobility and ADLs  DME used: cane, straight  DME owned (not currently used): rolling walker, single point cane, raised toilet, adjustable bed, rollator  Upon discharge, patient will have assistance from: Unknown    Objective:     Patient found supine with PureWick  upon PT entry to room.    General Precautions: Standard, contact, fall   Orthopedic Precautions:N/A   Braces: TLSO   /62 (BP Location: Right arm, Patient Position: Lying)   Pulse 99   Temp 98.1 °F (36.7 °C) (Oral)   Resp 17   Ht 5' 7" " (1.702 m)   Wt 102.1 kg (225 lb)   LMP  (LMP Unknown)   SpO2 96%   Breastfeeding No   BMI 35.24 kg/m²   Oxygen Device:  room air      Exams:    Cognition:  Patient is oriented to Person, Place, Time, Situation  Follows one-step commands  Insight to deficits/safety awareness: impaired    Edema: None present    Postural examination/scapula alignment: Head forward    Lower Extremity Range of Motion:  Right Lower Extremity: WNL PROM  Left Lower Extremity: WNL PROM    Lower Extremity Strength    Right LE  Left LE    Hip Flexion: 0/5 Hip Flexion: 0/5   Knee Extension: trace Knee Extension: 2/5   Knee Flexion: trace Knee Flexion: trace   Ankle Dorsiflexion:  0/5 Ankle Dorsiflexion: 0/5   Ankle Plantarflexion: 0/5 Ankle Plantarflexion: 0/5        Sensation:   Light touch sensation: Impaired LLE  and Impaired RLE     Functional Mobility:    Bed Mobility:  Supine to Sit: Activity did not occur  Pt able to perform skilled nursing supine to sit with total(A)x2. Her B LEs were lowered off EOB, pt refused to reach seated position despite assist and encouragement from therapists  Rolling L: Maximum Assistance and 2 persons  Rolling R: Maximum Assistance and 2 persons      Therapeutic Activities/Exercises     Patient assisted with functional mobility as noted above  Therapist educated patient on spinal precautions: no bending, lifting, or twisting. Patient expressed understanding.  PT educated pt on TLSO and need to don brace when upright or OOB  Patient educated on the importance of early mobility, OOB to prevent functional decline during hospital stay  Patient was instructed to utilize staff assistance for mobility/transfers.  Patient is appropriate to transfer with dependence to medichair via drawsheet and RN/PCT assist  Patient educated on PT POC and role of PT in acute care  White board updated to include patient's safest level of mobility with staff assistance, RN also updated    AM-PAC 6 CLICK MOBILITY  Turning over in bed  (including adjusting bedclothes, sheets and blankets)?: 2  Sitting down on and standing up from a chair with arms (e.g., wheelchair, bedside commode, etc.): 1  Moving from lying on back to sitting on the side of the bed?: 1  Moving to and from a bed to a chair (including a wheelchair)?: 1  Need to walk in hospital room?: 1  Climbing 3-5 steps with a railing?: 1  Basic Mobility Total Score: 7      Patient left HOB elevated with all lines intact, call button in reach, bed alarm on, and RN notified.      History/Goals:     PAST MEDICAL HISTORY:  Past Medical History:   Diagnosis Date    Anemia     Anxiety     Depressed     Diskitis     Hep C w/o coma, chronic     IV drug abuse     Kidney stone     Liver cirrhosis        Past Surgical History:   Procedure Laterality Date    ARTHROTOMY OF SHOULDER Left 11/15/2022    Procedure: ARTHROTOMY, SHOULDER;  Surgeon: Bjorn Hayes MD;  Location[facility];  Service: Orthopedics;  Laterality: Left;  Left acromioclavicular joint arthrotomy    BACK SURGERY      benine tumor removal      forehead, age 9    CHOLECYSTECTOMY      KIDNEY SURGERY      LUMBAR FUSION Bilateral 3/2/2022    Procedure: FUSION, SPINE, LUMBAR;  Surgeon: Guille Templeton MD;  Location: [address];  Service: Neurosurgery;  Laterality: Bilateral;  AIRO  T10--Pelvis    REMOVAL OF HARDWARE FROM SPINE N/A 2022    Procedure: REMOVAL, HARDWARE, SPINE;  Surgeon: Guille Templeton MD;  Location: [address];  Service: Neurosurgery;  Laterality: N/A;  [place]    SPINE SURGERY         GOALS:   Multidisciplinary Problems       Physical Therapy Goals          Problem: Physical Therapy    Goal Priority Disciplines Outcome Goal Variances Interventions   Physical Therapy Goal     PT, PT/OT Ongoing, Progressing     Description: Goals to be met by: 23     Patient will increase functional independence with mobility by performin. Supine to sit with Moderate Assistance  2. Sit to supine with Moderate Assistance  3.  Rolling to Left and Right with Moderate Assistance.  4. Sit to stand transfer with Maximum Assistance                         Time Tracking:     PT Received On: 01/21/23  PT Start Time: 1318     PT Stop Time: 1343  PT Total Time (min): 25 min     Billable Minutes: Evaluation 15 and Therapeutic Activity 10      Mihaela Dasilva, PT  01/21/2023  Pager# [phone]

## 2023-01-21 NOTE — ASSESSMENT & PLAN NOTE
Recently evaluated by psychiatry at OSH     PLAN  - Continue Lorazepam PRN anxiety  - Depression- Bupropion 300 mg XR qd  - Anxiety-Gabapentin off-label 400 mg TID

## 2023-01-21 NOTE — ASSESSMENT & PLAN NOTE
42 y.o F w/ hx of IV drug use (methamphetamine), chronic HCV with cirrhosis, spinal fusion (04/2021), epidural abscess with removal of hardware (02/2022), spinal fusion with hardware (T10 - Pelvis / 03/2022), obesity (BMI 39.3) and neurogenic bladder who initially presented to [facility] for evaluation of back pain and left leg weakness.  She reports initially noting the left leg weakness when she was about to go to the bathroom and was about to fall but was assisted by her boyfriend.   During hospitalization, she reports the weakness that started out in her left leg initially then spread upwards to her left thigh, left hip, then crossed over to the right hip and spread to her right leg.  Denies recent IV drug use. She reports her last incidence of drug use was more than 3 years ago and also denies tobacco, and alcohol abuse.  Reports cannabis use. back pain, lower extremity weakness(initally L>R, but now R>L), diarrhea.  Denies constipation, bladder or bowel incontinence, dysuria, dark urine. On further chart review, OSH records show that pt relapsed to IVDU prior to admission.    OSH evaluation for weakness  MRI brain: Nonspecific gliotic white matter signal change within bilateral cerebral hemispheres similar in extent and distribution greater than expected for patient age.  This may be related to chronic small vessel ischemia.  Numerous chronic systemic illnesses can result in this appearance. Demyelinating disease is also in the differential.  MRI cervical spine: C3-C7 multilevel spondylosis  MRI spine unable to be performed due to body habitus    Transfer to [facility] for neurosurgery evaluation, neurology evaluation and MRI    Differential diagnoses include, but not limited to:  - Multiple Sclerosis(history of past neurologic deficits, MRI brain with demyelinating disease concerns)  - Guillan Hopkins Syndrome(recent UTI infection, ascending pattern of weakness)  - Inflammatory myopathy(physical exam with  proximal LE muscle weakness)  - Infected hardware vs. Spinal abscess(hx of infected hardware, IVDU is a risk factor)  - Central cord syndrome(less likely as weakness in LE>UE which would be atypical)   - Most likely etiology is infected hardware and discitis + kyphosis of thoracolumbar spine. Finished course of Meropenem at OSH for ESBL bacteremia, with repeat BC NGTD.    PLAN  - NSGY following. To OR for hardware removal, washout, and re-instrumentation/extension of fusion to T4 on 1/24/23  - ID following  - Repeat UA dirty, urine culture pending  - Repeat BCx pending  - Patient stable, not septic. Continue prophylaxis amoxicillin due to history of infected hardware.

## 2023-01-21 NOTE — PLAN OF CARE
PT AAOx4. States she is in constant pain. Takes all meds as available for  pain management. States current meds not controlling pain. MD notified. Frequency increased. Will monitor.

## 2023-01-22 ENCOUNTER — PATIENT MESSAGE (OUTPATIENT)
Dept: ADMINISTRATIVE | Facility: OTHER | Age: 43
End: 2023-01-22
Payer: MEDICAID

## 2023-01-22 LAB
ALBUMIN SERPL BCP-MCNC: 2.1 G/DL (ref 3.5–5.2)
ALP SERPL-CCNC: 117 U/L (ref 55–135)
ALT SERPL W/O P-5'-P-CCNC: 20 U/L (ref 10–44)
ANION GAP SERPL CALC-SCNC: 6 MMOL/L (ref 8–16)
AST SERPL-CCNC: 39 U/L (ref 10–40)
BASOPHILS # BLD AUTO: 0.02 K/UL (ref 0–0.2)
BASOPHILS NFR BLD: 0.9 % (ref 0–1.9)
BILIRUB SERPL-MCNC: 1.8 MG/DL (ref 0.1–1)
BUN SERPL-MCNC: 14 MG/DL (ref 6–20)
CALCIUM SERPL-MCNC: 8.6 MG/DL (ref 8.7–10.5)
CHLORIDE SERPL-SCNC: 107 MMOL/L (ref 95–110)
CO2 SERPL-SCNC: 24 MMOL/L (ref 23–29)
CREAT SERPL-MCNC: 0.5 MG/DL (ref 0.5–1.4)
DIFFERENTIAL METHOD: ABNORMAL
EOSINOPHIL # BLD AUTO: 0.1 K/UL (ref 0–0.5)
EOSINOPHIL NFR BLD: 3 % (ref 0–8)
ERYTHROCYTE [DISTWIDTH] IN BLOOD BY AUTOMATED COUNT: 17.1 % (ref 11.5–14.5)
EST. GFR  (NO RACE VARIABLE): >60 ML/MIN/1.73 M^2
GLUCOSE SERPL-MCNC: 66 MG/DL (ref 70–110)
HCT VFR BLD AUTO: 29.4 % (ref 37–48.5)
HGB BLD-MCNC: 8.8 G/DL (ref 12–16)
IMM GRANULOCYTES # BLD AUTO: 0.01 K/UL (ref 0–0.04)
IMM GRANULOCYTES NFR BLD AUTO: 0.4 % (ref 0–0.5)
LYMPHOCYTES # BLD AUTO: 0.5 K/UL (ref 1–4.8)
LYMPHOCYTES NFR BLD: 19.6 % (ref 18–48)
MAGNESIUM SERPL-MCNC: 1.6 MG/DL (ref 1.6–2.6)
MCH RBC QN AUTO: 27.2 PG (ref 27–31)
MCHC RBC AUTO-ENTMCNC: 29.9 G/DL (ref 32–36)
MCV RBC AUTO: 91 FL (ref 82–98)
MONOCYTES # BLD AUTO: 0.2 K/UL (ref 0.3–1)
MONOCYTES NFR BLD: 10.2 % (ref 4–15)
NEUTROPHILS # BLD AUTO: 1.6 K/UL (ref 1.8–7.7)
NEUTROPHILS NFR BLD: 65.9 % (ref 38–73)
NRBC BLD-RTO: 0 /100 WBC
PHOSPHATE SERPL-MCNC: 3.8 MG/DL (ref 2.7–4.5)
PLATELET # BLD AUTO: 83 K/UL (ref 150–450)
PMV BLD AUTO: 9.1 FL (ref 9.2–12.9)
POTASSIUM SERPL-SCNC: 4 MMOL/L (ref 3.5–5.1)
PROT SERPL-MCNC: 7.2 G/DL (ref 6–8.4)
RBC # BLD AUTO: 3.23 M/UL (ref 4–5.4)
SODIUM SERPL-SCNC: 137 MMOL/L (ref 136–145)
WBC # BLD AUTO: 2.35 K/UL (ref 3.9–12.7)

## 2023-01-22 PROCEDURE — 36415 COLL VENOUS BLD VENIPUNCTURE: CPT | Performed by: STUDENT IN AN ORGANIZED HEALTH CARE EDUCATION/TRAINING PROGRAM

## 2023-01-22 PROCEDURE — 25000003 PHARM REV CODE 250

## 2023-01-22 PROCEDURE — 25000003 PHARM REV CODE 250: Performed by: INTERNAL MEDICINE

## 2023-01-22 PROCEDURE — 27000207 HC ISOLATION

## 2023-01-22 PROCEDURE — 11000001 HC ACUTE MED/SURG PRIVATE ROOM

## 2023-01-22 PROCEDURE — 25000003 PHARM REV CODE 250: Performed by: STUDENT IN AN ORGANIZED HEALTH CARE EDUCATION/TRAINING PROGRAM

## 2023-01-22 PROCEDURE — 99233 SBSQ HOSP IP/OBS HIGH 50: CPT | Mod: ,,, | Performed by: NEUROLOGICAL SURGERY

## 2023-01-22 PROCEDURE — 83735 ASSAY OF MAGNESIUM: CPT | Performed by: STUDENT IN AN ORGANIZED HEALTH CARE EDUCATION/TRAINING PROGRAM

## 2023-01-22 PROCEDURE — 63600175 PHARM REV CODE 636 W HCPCS

## 2023-01-22 PROCEDURE — 84100 ASSAY OF PHOSPHORUS: CPT | Performed by: STUDENT IN AN ORGANIZED HEALTH CARE EDUCATION/TRAINING PROGRAM

## 2023-01-22 PROCEDURE — 99233 PR SUBSEQUENT HOSPITAL CARE,LEVL III: ICD-10-PCS | Mod: ,,, | Performed by: NEUROLOGICAL SURGERY

## 2023-01-22 PROCEDURE — 99233 SBSQ HOSP IP/OBS HIGH 50: CPT | Mod: ,,, | Performed by: NURSE PRACTITIONER

## 2023-01-22 PROCEDURE — 99232 SBSQ HOSP IP/OBS MODERATE 35: CPT | Mod: ,,, | Performed by: STUDENT IN AN ORGANIZED HEALTH CARE EDUCATION/TRAINING PROGRAM

## 2023-01-22 PROCEDURE — 99233 PR SUBSEQUENT HOSPITAL CARE,LEVL III: ICD-10-PCS | Mod: ,,, | Performed by: NURSE PRACTITIONER

## 2023-01-22 PROCEDURE — 85025 COMPLETE CBC W/AUTO DIFF WBC: CPT | Performed by: STUDENT IN AN ORGANIZED HEALTH CARE EDUCATION/TRAINING PROGRAM

## 2023-01-22 PROCEDURE — 80053 COMPREHEN METABOLIC PANEL: CPT | Performed by: STUDENT IN AN ORGANIZED HEALTH CARE EDUCATION/TRAINING PROGRAM

## 2023-01-22 PROCEDURE — 99232 PR SUBSEQUENT HOSPITAL CARE,LEVL II: ICD-10-PCS | Mod: ,,, | Performed by: STUDENT IN AN ORGANIZED HEALTH CARE EDUCATION/TRAINING PROGRAM

## 2023-01-22 RX ORDER — METHOCARBAMOL 500 MG/1
1000 TABLET, FILM COATED ORAL EVERY 8 HOURS
Status: DISCONTINUED | OUTPATIENT
Start: 2023-01-22 | End: 2023-01-24

## 2023-01-22 RX ORDER — OXYCODONE HYDROCHLORIDE 5 MG/1
5 TABLET ORAL EVERY 4 HOURS PRN
Status: DISCONTINUED | OUTPATIENT
Start: 2023-01-22 | End: 2023-01-22

## 2023-01-22 RX ORDER — METHOCARBAMOL 500 MG/1
500 TABLET, FILM COATED ORAL EVERY 8 HOURS
Status: DISCONTINUED | OUTPATIENT
Start: 2023-01-22 | End: 2023-01-22

## 2023-01-22 RX ORDER — ACETAMINOPHEN 325 MG/1
650 TABLET ORAL EVERY 8 HOURS
Status: DISCONTINUED | OUTPATIENT
Start: 2023-01-22 | End: 2023-01-24

## 2023-01-22 RX ORDER — METHOCARBAMOL 750 MG/1
750 TABLET, FILM COATED ORAL EVERY 8 HOURS
Status: DISCONTINUED | OUTPATIENT
Start: 2023-01-22 | End: 2023-01-22

## 2023-01-22 RX ORDER — OXYCODONE HYDROCHLORIDE 10 MG/1
10 TABLET ORAL EVERY 4 HOURS PRN
Status: DISCONTINUED | OUTPATIENT
Start: 2023-01-22 | End: 2023-01-24

## 2023-01-22 RX ADMIN — POTASSIUM CHLORIDE 20 MEQ: 1500 TABLET, EXTENDED RELEASE ORAL at 09:01

## 2023-01-22 RX ADMIN — GABAPENTIN 400 MG: 400 CAPSULE ORAL at 02:01

## 2023-01-22 RX ADMIN — HYDROCODONE BITARTRATE AND ACETAMINOPHEN 1 TABLET: 5; 325 TABLET ORAL at 10:01

## 2023-01-22 RX ADMIN — ENOXAPARIN SODIUM 40 MG: 40 INJECTION SUBCUTANEOUS at 05:01

## 2023-01-22 RX ADMIN — ACETAMINOPHEN 650 MG: 325 TABLET ORAL at 09:01

## 2023-01-22 RX ADMIN — OXYCODONE HYDROCHLORIDE 10 MG: 10 TABLET ORAL at 09:01

## 2023-01-22 RX ADMIN — GABAPENTIN 400 MG: 400 CAPSULE ORAL at 09:01

## 2023-01-22 RX ADMIN — METHOCARBAMOL 750 MG: 750 TABLET ORAL at 02:01

## 2023-01-22 RX ADMIN — ACETAMINOPHEN 650 MG: 325 TABLET ORAL at 02:01

## 2023-01-22 RX ADMIN — AMOXICILLIN 1000 MG: 500 CAPSULE ORAL at 11:01

## 2023-01-22 RX ADMIN — METHOCARBAMOL 1000 MG: 500 TABLET ORAL at 09:01

## 2023-01-22 RX ADMIN — AMOXICILLIN 1000 MG: 500 CAPSULE ORAL at 09:01

## 2023-01-22 RX ADMIN — POLYETHYLENE GLYCOL 3350 17 G: 17 POWDER, FOR SOLUTION ORAL at 09:01

## 2023-01-22 RX ADMIN — METHOCARBAMOL 500 MG: 500 TABLET ORAL at 06:01

## 2023-01-22 RX ADMIN — PANTOPRAZOLE SODIUM 40 MG: 40 TABLET, DELAYED RELEASE ORAL at 09:01

## 2023-01-22 RX ADMIN — OXYCODONE HYDROCHLORIDE 10 MG: 10 TABLET ORAL at 03:01

## 2023-01-22 RX ADMIN — LORAZEPAM 0.5 MG: 0.5 TABLET ORAL at 10:01

## 2023-01-22 RX ADMIN — LORAZEPAM 0.5 MG: 0.5 TABLET ORAL at 11:01

## 2023-01-22 RX ADMIN — HYDROCODONE BITARTRATE AND ACETAMINOPHEN 1 TABLET: 5; 325 TABLET ORAL at 04:01

## 2023-01-22 RX ADMIN — BUPROPION HYDROCHLORIDE 300 MG: 300 TABLET, FILM COATED, EXTENDED RELEASE ORAL at 09:01

## 2023-01-22 RX ADMIN — LACTULOSE 20 G: 20 SOLUTION ORAL at 09:01

## 2023-01-22 RX ADMIN — Medication 6 MG: at 09:01

## 2023-01-22 NOTE — PLAN OF CARE
A&Ox4, RA, pain managed with PRN medications. Discussed POC, smoking cessation, recreational drug use, and infection control. Pending surgery on 1/24.      Problem: Adult Inpatient Plan of Care  Goal: Plan of Care Review  Outcome: Ongoing, Progressing  Goal: Absence of Hospital-Acquired Illness or Injury  Outcome: Ongoing, Progressing  Goal: Optimal Comfort and Wellbeing  Outcome: Ongoing, Progressing     Problem: Skin Injury Risk Increased  Goal: Skin Health and Integrity  Outcome: Ongoing, Progressing     Problem: Fall Injury Risk  Goal: Absence of Fall and Fall-Related Injury  Outcome: Ongoing, Progressing

## 2023-01-22 NOTE — PROGRESS NOTES
[facility] Ca - Neurosurgery ([facility])  Neurosurgery  Progress Note    Subjective:     History of Present Illness: Ms. Zazueta is a 42 y.o F w/ hx ofIV drug use (methamphetamine), chronic HCV with cirrhosis, spinal fusion (04/2021), epidural abscess with removal of hardware (02/2022), spinal fusion with hardware (T10 - Pelvis / 03/2022), obesity (BMI 39.3) and neurogenic bladder and recent shoulder surgery who initially presented to [facility] for evaluation of back pain and left leg weakness.  She reports initially noting the left leg weakness when she was about to go to the bathroom and was about to fall but was assisted by her boyfriend.  She was brought to the ED via EMS.  Urinalysis with UTI concerns and blood cultures at OSH grew ESBL E coli, and shoulder effusion concern for infection so she was treated with meropenem.  During hospitalization, she reports the weakness that started out in her left leg initially then spread upwards to her left thigh, left hip, then crossed over to the right hip and spread to her right leg.  Denies recent IV drug use. She reports her last incidence of drug use was more than 3 years ago and also denies tobacco, and alcohol abuse.  Reports cannabis use.     OSH course notable for concerning urinalysis and blood cultures positive for ESBL E coli treated with meropenem.  She was also admit to the ICU where she was treated with Precedex for significant body aches, irritability, and muscle spasms.  Concerns of a murmur on physical exam so she underwent TTE which did not show any vegetations.  Worsening lower extremity weakness workup with MRI head nonspecific, MRI cervical spine with multilevel spondylosis, X-ray spine with multilevel thoracolumbar fusion and diskectomy, focal kyphosis at T9-10 increased compared to prior.  She was started on prophylaxis amoxicillin due to her history of infected hardware.  MRI spine unable to be completed due to patient's body habitus so she was  transferred [place] [facility] [place] and Neurosurgery, Neurology evaluation.      Post-Op Info:  Procedure(s) (LRB):  **AIRO** Revision fusion, spine T4-Pelvis, T9 corpectomy, AIRO, Carmen, Globus, neuromonitoring (N/A)         Interval History: 1/22 naeon, plan for OR Tuesday    Medications:  Continuous Infusions:  Scheduled Meds:   amoxicillin  1,000 mg Oral Q12H    buPROPion  300 mg Oral Daily    enoxaparin  40 mg Subcutaneous Daily    gabapentin  400 mg Oral TID    lactulose  20 g Oral TID    pantoprazole  40 mg Oral Daily    polyethylene glycol  17 g Oral Daily    potassium chloride  20 mEq Oral Daily     PRN Meds:acetaminophen, dextrose 10%, dextrose 10%, glucagon (human recombinant), glucose, glucose, HYDROcodone-acetaminophen, LORazepam, magnesium hydroxide 400 mg/5 ml, melatonin, methocarbamoL, naloxone, promethazine, sodium chloride 0.9%     Review of Systems  Objective:     Weight: 104 kg (229 lb 4.5 oz)  Body mass index is 35.91 kg/m².  Vital Signs (Most Recent):  Temp: 98.7 °F (37.1 °C) (01/22/23 0442)  Pulse: 104 (01/22/23 0442)  Resp: 18 (01/22/23 0442)  BP: 106/65 (01/22/23 0442)  SpO2: (!) 93 % (01/22/23 0442) Vital Signs (24h Range):  Temp:  [97.5 °F (36.4 °C)-98.7 °F (37.1 °C)] 98.7 °F (37.1 °C)  Pulse:  [] 104  Resp:  [16-18] 18  SpO2:  [93 %-97 %] 93 %  BP: (106-123)/(56-70) 106/65                              Neurosurgery Physical Exam  General: well developed, well nourished, no distress.   Head: normocephalic, atraumatic  Neurologic: Alert and oriented. Thought content appropriate.  GCS: Motor: 6/Verbal: 5/Eyes: 4 GCS Total: 15  Mental Status: Awake, Alert, Oriented x 4  Language: No aphasia  Speech: No dysarthria  Cranial nerves: face symmetric, tongue midline, CN II-XII grossly intact.   Eyes: pupils equal, round, reactive to light with accommodation, EOMI, nystagmus.   Pulmonary: normal respirations, no signs of respiratory distress  Abdomen: soft, non-distended, not tender  to palpation  Skin: Skin is warm, dry and intact.  Sensory: intact to light touch throughout     Motor Strength:Moves all extremities spontaneously with good tone.  Full strength upper and lower extremities. No abnormal movements seen.      Strength   Deltoids Triceps Biceps Wrist Extension Wrist Flexion Hand    Upper: R 5/5 5/5 5/5 5/5 5/5 5/5     L 5/5 5/5 5/5 5/5 5/5 5/5       Iliopsoas Quadriceps Knee  Flexion Tibialis  anterior Gastro- cnemius EHL   Lower: R 2/5 1/5 2/5 1/5 1/5 1/5     L 2/5 2/5 1/5 1/5 1/5 1/5      Reflexes:   DTR: 2+ symmetrically throughout.  Parker's: Negative.  Babinski's: Negative.  Clonus: 2 beats bilateral lower extremity     Incision well healed       Significant Labs:  Recent Labs   Lab 01/21/23 0319 01/22/23  0427   GLU 74 66*   * 137   K 3.7 4.0    107   CO2 24 24   BUN 16 14   CREATININE 0.5 0.5   CALCIUM 8.3* 8.6*   MG 1.6 1.6       Recent Labs   Lab 01/21/23 0319   WBC 2.08*   HGB 8.9*   HCT 29.5*   PLT 83*       No results for input(s): LABPT, INR, APTT in the last 48 hours.    Microbiology Results (last 7 days)       Procedure Component Value Units Date/Time    Blood culture [[id number]] Collected: 01/20/23 1131    Order Status: Completed Specimen: Blood Updated: 01/21/23 1412     Blood Culture, Routine No Growth to date      No Growth to date    Narrative:      Collection has been rescheduled by 2 at 01/20/2023 11:03 Reason:   Patient unavailable in room with doctors   Collection has been rescheduled by 2 at 01/20/2023 11:03 Reason:   Patient unavailable in room with doctors     Blood culture [[id number]] Collected: 01/20/23 1131    Order Status: Completed Specimen: Blood Updated: 01/21/23 1412     Blood Culture, Routine No Growth to date      No Growth to date    Narrative:      Collection has been rescheduled by 2 at 01/20/2023 11:03 Reason:   Patient unavailable in room with doctors   Collection has been rescheduled by BH2 at 01/20/2023 11:03 Reason:    Patient unavailable in room with doctors     Urine culture [[id number]] Collected: 01/20/23 0130    Order Status: Completed Specimen: Urine Updated: 01/21/23 1144     Urine Culture, Routine Multiple organisms isolated. None in predominance.  Repeat if      clinically necessary.    Narrative:      Specimen Source->Urine    Blood culture [[id number]]     Order Status: Canceled Specimen: Blood     Culture, Respiratory with Gram Stain [[id number]]     Order Status: Canceled Specimen: Respiratory           All pertinent labs from the last 24 hours have been reviewed.    Significant Diagnostics:  CT C-spine:  Vertebrae: The dens, lateral masses, and occipital condyles are intact.  There is no evidence of fracture or dislocation.     Discs: Multilevel disc height loss and degenerative endplate change.  Prominent C6 inferior endplate Schmorl's node, similar in appearance dating back to MRI from 02/14/2022.     Degenerative changes: Sent spinal canal dimensions at C3-C4 are 10.5 mm, 8.5 mm at C4-C5, 10.0 mm at C5-C6, and 10.8 mm at C6-C7 .  Multilevel uncovertebral spurring with multilevel neural foraminal narrowing most pronounced at C5-C6 with moderate right neural foraminal narrowing and C6-C7 with moderate left neural foraminal narrowing.  Physical Exam  Neurosurgery Physical Exam    Assessment/Plan:     * Weakness of both lower extremities  Pt is 42F multiple spinal surgeries and revisions last T10- Pelvis in March 2022 who presented to OSH with concern for shoulder infection and UTI found to have E coli sepsis who has had progressive worsening BLE weakness, XR showed possible worsening proximal junctional kyphotic deformity. Transferred to [facility] to  and neurosurgery was consulted    Plan:  Admitted to  floor status  -- MRI C/T/L spine 1/20 with C7 SP enhancement, focal kyphosis at T8-10 with severe anterior height loss at T9, enhancing C6 inferior endplate Schmorl's node  -- CT T/L spine thin cut 1/20 with haloing of  pelvic and T10 screws, spondylodiscitis T8-9, T9-10  -- CT C spine with concern for discitis at C6-7  -- flex ext XR done, limited view   -- Continue amoxicillin at this time, f/u further ID recs. BCx NGTD  -- 1/20 ESR 70, CRP 9.4. elevated from prior  -- Booked for Tuesday for T4-Pelvis revision extension, T9 corpectomy  -- TLSO at bedside  -- multimodal pain control per primary medicine team  NSGY will continue to follow. Please contact team with any further questions.        Carlin Quach MD  Neurosurgery  [facility] - Neurosurgery ([facility])

## 2023-01-22 NOTE — PROGRESS NOTES
[facility] Ca - Neurosurgery ([facility])  [facility]  Progress Note    Patient Name: Rachel Zazueta  MRN: [id number]  Patient Class: IP- Inpatient   Admission Date: 1/19/2023  Length of Stay: 3 days  Attending Physician: Oliver Méndez MD  Primary Care Provider: Dannielle Merino DO        Subjective:     Principal Problem:Weakness of both lower extremities        HPI:  Ms. Zazueta is a 42 y.o F w/ hx ofIV drug use (methamphetamine), chronic HCV with cirrhosis, spinal fusion (04/2021), epidural abscess with removal of hardware (02/2022), spinal fusion with hardware (T10 - Pelvis / 03/2022), obesity (BMI 39.3) and neurogenic bladder who initially presented to [facility] for evaluation of back pain and left leg weakness.  She reports initially noting the left leg weakness when she was about to go to the bathroom and was about to fall but was assisted by her boyfriend.  She was brought to the ED via EMS.  Urinalysis with UTI concerns and blood cultures at OSH grew ESBL E coli so she was treated with meropenem.  During hospitalization, she reports the weakness that started out in her left leg initially then spread upwards to her left thigh, left hip, then crossed over to the right hip and spread to her right leg.  Denies recent IV drug use. She reports her last incidence of drug use was more than 3 years ago and also denies tobacco, and alcohol abuse.  Reports cannabis use.    She also reports more than 10 years ago she had an episode of a headache that lasted about 4 days and was associated with residual defects of a strabismus in the left eye with associated visual disturbances.  She also reports over the past few years her friends have noticed that she sometimes has word-finding difficulty during conversations.     Reports shortness of breath when sitting up, fever, chills, back pain, lower extremity weakness(initally L>R, but now R>L), diarrhea.  Denies cough, nausea, vomiting, constipation, bladder or  bowel incontinence, dysuria, dark urine, new vision changes.    OSH course notable for concerning urinalysis and blood cultures positive for ESBL E coli treated with meropenem.  She was also admit to the ICU where she was treated with Precedex for significant body aches, irritability, and muscle spasms.  Concerns of a murmur on physical exam so she underwent TTE which did not show any vegetations.  Worsening lower extremity weakness workup with MRI head nonspecific, MRI cervical spine with multilevel spondylosis, X-ray spine with multilevel thoracolumbar fusion and diskectomy, focal kyphosis at T9-10 increased compared to prior.  She was started on prophylaxis amoxicillin due to her history of infected hardware.  MRI spine unable to be completed due to patient's body habitus so she was transferred to [facility] [place] and Neurosurgery, Neurology evaluation.      Overview/Hospital Course:  Pt admitted as a transfer from [facility] for worsening LE weakness, concern for spinal infection, and need for MRI which could not be done at OSH. Imaging was completed here. Worsening proximal junctional kyphosis noted at T9-10 noted with concern for discitis at T8-9, T9-10. NSGY evaluated. Plan for OR Tuesday 1/24 for hardware removal, washout, and re-instrumentation/extension of fusion to T4.      Interval History: Reports paraspinal muscle spasms pain aided significantly by muscle relaxers. Denies fevers, chills, or other complaints. Remains weak in BLLE.     Review of Systems   Constitutional:  Negative for fever.   HENT:  Negative for congestion and rhinorrhea.    Eyes:  Negative for visual disturbance.   Respiratory:  Negative for shortness of breath.    Cardiovascular:  Negative for chest pain.   Gastrointestinal:  Negative for abdominal distention, abdominal pain, nausea and vomiting.   Genitourinary:  Negative for dysuria and hematuria.   Musculoskeletal:  Positive for back pain. Negative for neck pain.    Neurological:  Positive for weakness. Negative for dizziness, seizures, light-headedness, numbness and headaches.   Objective:     Vital Signs (Most Recent):  Temp: 98.7 °F (37.1 °C) (01/22/23 0442)  Pulse: 104 (01/22/23 0442)  Resp: 18 (01/22/23 0442)  BP: 106/65 (01/22/23 0442)  SpO2: (!) 93 % (01/22/23 0442)   Vital Signs (24h Range):  Temp:  [97.5 °F (36.4 °C)-98.7 °F (37.1 °C)] 98.7 °F (37.1 °C)  Pulse:  [] 104  Resp:  [16-18] 18  SpO2:  [93 %-97 %] 93 %  BP: (106-123)/(62-70) 106/65     Weight: 104 kg (229 lb 4.5 oz)  Body mass index is 35.91 kg/m².    Intake/Output Summary (Last 24 hours) at 1/22/2023 0856  Last data filed at 1/21/2023 2008  Gross per 24 hour   Intake 700 ml   Output --   Net 700 ml      Physical Exam  Vitals and nursing note reviewed.   Constitutional:       General: She is not in acute distress.     Appearance: She is obese. She is not ill-appearing, toxic-appearing or diaphoretic.   HENT:      Head: Normocephalic.      Mouth/Throat:      Mouth: Mucous membranes are moist.   Eyes:      General: No scleral icterus.        Right eye: No discharge.         Left eye: No discharge.   Cardiovascular:      Rate and Rhythm: Normal rate and regular rhythm.      Heart sounds: Murmur heard.   Pulmonary:      Effort: Pulmonary effort is normal. No respiratory distress.      Breath sounds: Normal breath sounds.   Abdominal:      General: Bowel sounds are normal.      Palpations: Abdomen is soft.      Tenderness: There is no abdominal tenderness.   Musculoskeletal:      Cervical back: No rigidity.      Right lower leg: No edema (Mild).      Left lower leg: No edema (Mild).   Skin:     General: Skin is warm and dry.      Coloration: Skin is not jaundiced.   Neurological:      Mental Status: She is alert and oriented to person, place, and time. Mental status is at baseline.      Motor: Weakness (Bilateral LE) and tremor (Bilateral UE) present.      Deep Tendon Reflexes: Reflexes abnormal. Babinski  sign present on the right side. Babinski sign present on the left side.   Psychiatric:         Mood and Affect: Mood normal.         Behavior: Behavior normal.       Significant Labs: All pertinent labs within the past 24 hours have been reviewed.  CBC:   Recent Labs   Lab 01/21/23 0319 01/22/23 0427   WBC 2.08* 2.35*   HGB 8.9* 8.8*   HCT 29.5* 29.4*   PLT 83* 83*     CMP:   Recent Labs   Lab 01/21/23 0319 01/22/23 0427   * 137   K 3.7 4.0    107   CO2 24 24   GLU 74 66*   BUN 16 14   CREATININE 0.5 0.5   CALCIUM 8.3* 8.6*   PROT 6.9 7.2   ALBUMIN 2.0* 2.1*   BILITOT 1.6* 1.8*   ALKPHOS 121 117   AST 39 39   ALT 22 20   ANIONGAP 6* 6*       Significant Imaging: I have reviewed all pertinent imaging results/findings within the past 24 hours.      Assessment/Plan:      * Weakness of both lower extremities  42 y.o F w/ hx of IV drug use (methamphetamine), chronic HCV with cirrhosis, spinal fusion (04/2021), epidural abscess with removal of hardware (02/2022), spinal fusion with hardware (T10 - Pelvis / 03/2022), obesity (BMI 39.3) and neurogenic bladder who initially presented to [facility] for evaluation of back pain and left leg weakness.  She reports initially noting the left leg weakness when she was about to go to the bathroom and was about to fall but was assisted by her boyfriend.   During hospitalization, she reports the weakness that started out in her left leg initially then spread upwards to her left thigh, left hip, then crossed over to the right hip and spread to her right leg.  Denies recent IV drug use. She reports her last incidence of drug use was more than 3 years ago and also denies tobacco, and alcohol abuse.  Reports cannabis use. back pain, lower extremity weakness(initally L>R, but now R>L), diarrhea.  Denies constipation, bladder or bowel incontinence, dysuria, dark urine. On further chart review, OSH records show that pt relapsed to IVDU prior to admission.    OSH  evaluation for weakness  MRI brain: Nonspecific gliotic white matter signal change within bilateral cerebral hemispheres similar in extent and distribution greater than expected for patient age.  This may be related to chronic small vessel ischemia.  Numerous chronic systemic illnesses can result in this appearance. Demyelinating disease is also in the differential.  MRI cervical spine: C3-C7 multilevel spondylosis  MRI spine unable to be performed due to body habitus    Transfer to [facility] for neurosurgery evaluation, neurology evaluation and MRI    Differential diagnoses include, but not limited to:  - Multiple Sclerosis(history of past neurologic deficits, MRI brain with demyelinating disease concerns)  - Guillan Schaumburg Syndrome(recent UTI infection, ascending pattern of weakness)  - Inflammatory myopathy(physical exam with proximal LE muscle weakness)  - Infected hardware vs. Spinal abscess(hx of infected hardware, IVDU is a risk factor)  - Central cord syndrome(less likely as weakness in LE>UE which would be atypical)   - Most likely etiology is infected hardware and discitis + kyphosis of thoracolumbar spine. Finished course of Meropenem at OSH for ESBL bacteremia, with repeat BC NGTD.    PLAN  - NSGY following. To OR for hardware removal, washout, and re-instrumentation/extension of fusion to T4 on 1/24/23  - ID following  - Repeat UA dirty, urine culture polymicrobial w/o predominant organism  - Repeat BCx negative thusfar  - Patient stable, not septic. Continue prophylaxis amoxicillin due to history of infected hardware.    - ID following, appreciate recs    Kyphosis of thoracolumbar region  See weakness    Thoracic discitis  See weakness    Anxiety and depression  Recently evaluated by psychiatry at OSH     PLAN  - Continue Lorazepam PRN anxiety  - Depression- Bupropion 300 mg XR qd  - Anxiety-Gabapentin off-label 400 mg TID    Substance use disorder  Denies recent IV drug use. She reports her last incidence of  "drug use was more than 3 years ago and also denies tobacco, and alcohol abuse. Reports cannabis use.   OSH records however note that she relapsed prior to admission. Was being followed by Psych.    Chronic hepatitis C with cirrhosis  See "Cirrhosis of liver with ascites" A&P    Cirrhosis of liver with ascites  Chronic. Hx of Hepatitis C. Transaminitis present as far back as 2015 on labs.  RUQ U/S 02/2022: Hepatic cirrhosis and portal hypertension with satisfactory Doppler evaluation of the liver.-  Follows with GI outpatient with most recent visit in 05/2022 where she refused transplant consideration.  Patient reported shortness of breath exacerbated by sitting up, concerning for platypnea.   Repeat with possible portopulmonary hypertension, extracardiac shunting on bubble study.     MELD-Na score: 11 at 1/21/2023  3:19 AM  MELD score: 11 at 1/21/2023  3:19 AM  Calculated from:  Serum Creatinine: 0.5 mg/dL (Using min of 1 mg/dL) at 1/21/2023  3:19 AM  Serum Sodium: 135 mmol/L at 1/21/2023  3:19 AM  Total Bilirubin: 1.6 mg/dL at 1/21/2023  3:19 AM  INR(ratio): 1.3 at 1/19/2023 10:53 PM  Age: 42 years    PLAN  - Trend transaminitis on CMP  - Continue lactulose  - Patient overdue for GI follow-up for hep C treatment.  on outpatient followup at discharge      VTE Risk Mitigation (From admission, onward)         Ordered     enoxaparin injection 40 mg  Daily         01/21/23 0733     IP VTE HIGH RISK PATIENT  Once         01/19/23 2153     Place sequential compression device  Until discontinued         01/19/23 2153     Reason for No Pharmacological VTE Prophylaxis  Once        Question:  Reasons:  Answer:  Physician Provided (leave comment)  Comment:  Potential NSGY procedure    01/19/23 2153                Discharge Planning   SHIRA: 1/26/2023     Code Status: Full Code   Is the patient medically ready for discharge?: No    Reason for patient still in hospital (select all that apply): Treatment  Discharge Plan A: " Home with family                  Gama Nunez MD  Department of Hospital Medicine   [facility] - Neurosurgery ([facility])

## 2023-01-22 NOTE — ASSESSMENT & PLAN NOTE
42 y.o F w/ hx of IV drug use (methamphetamine), chronic HCV with cirrhosis, spinal fusion (04/2021), epidural abscess with removal of hardware (02/2022), spinal fusion with hardware (T10 - Pelvis / 03/2022), obesity (BMI 39.3) and neurogenic bladder who initially presented to [facility] for evaluation of back pain and left leg weakness.  She reports initially noting the left leg weakness when she was about to go to the bathroom and was about to fall but was assisted by her boyfriend.   During hospitalization, she reports the weakness that started out in her left leg initially then spread upwards to her left thigh, left hip, then crossed over to the right hip and spread to her right leg.  Denies recent IV drug use. She reports her last incidence of drug use was more than 3 years ago and also denies tobacco, and alcohol abuse.  Reports cannabis use. back pain, lower extremity weakness(initally L>R, but now R>L), diarrhea.  Denies constipation, bladder or bowel incontinence, dysuria, dark urine. On further chart review, OSH records show that pt relapsed to IVDU prior to admission.    OSH evaluation for weakness  MRI brain: Nonspecific gliotic white matter signal change within bilateral cerebral hemispheres similar in extent and distribution greater than expected for patient age.  This may be related to chronic small vessel ischemia.  Numerous chronic systemic illnesses can result in this appearance. Demyelinating disease is also in the differential.  MRI cervical spine: C3-C7 multilevel spondylosis  MRI spine unable to be performed due to body habitus    Transfer to [facility] [place] for neurosurgery evaluation, neurology evaluation and MRI    Differential diagnoses include, but not limited to:  - Multiple Sclerosis(history of past neurologic deficits, MRI brain with demyelinating disease concerns)  - Guillan Bertram Syndrome(recent UTI infection, ascending pattern of weakness)  - Inflammatory myopathy(physical exam with  proximal LE muscle weakness)  - Infected hardware vs. Spinal abscess(hx of infected hardware, IVDU is a risk factor)  - Central cord syndrome(less likely as weakness in LE>UE which would be atypical)   - Most likely etiology is infected hardware and discitis + kyphosis of thoracolumbar spine. Finished course of Meropenem at OSH for ESBL bacteremia, with repeat BC NGTD.    PLAN  - NSGY following. To OR for hardware removal, washout, and re-instrumentation/extension of fusion to T4 on 1/24/23  - ID following  - Repeat UA dirty, urine culture polymicrobial w/o predominant organism  - Repeat BCx negative thusfar  - Patient stable, not septic. Continue prophylaxis amoxicillin due to history of infected hardware.    - ID following, appreciate recs

## 2023-01-22 NOTE — HPI
Ms. Zazueta is a 42-year-old woman known to ID with hx of IVDU, IV drug use (methamphetamine), chronic HCV with cirrhosis, chronic pancytopenia, spinal fusion (04/2021), epidural abscess (GBS) with removal of hardware (02/2022), T10-pelvis spinal fusion with hardware (03/2022), obesity,  and neurogenic bladder who was admitted to [facility] on 12/23/22 with back pain and increasing weakness in the lower extremities.       Urine and blood cx from that admission + for ESBL E coli which was treated with ertapenem.  Repeat blood cx on 12/28 and 1/10 negative.   Patient started on physical therapy but weakness in the lower extremities increased, and the patient is no longer able to walk. She is able to urinate intermittently and defecate.      An MRI cervical spine 1/10 was significant  for multilevel spondylosis.  MRI brain with nonspecific findings.  Xray of spine notable for focal kyphosis at T9-10 increased when compared to prior scoliosis radiographs.  An MRI of the spine was not available due to the patient's size. and she was transferred to [facility] for advanced imaging and Neurosurgery evaluation.      MRI  imaging here  on 1/20 concerning for worsening kyphosis, extensive edema in the paraspinal muscle. CT with spondylodiscitis T8-9, T9-10    Neurosurgery planning hardware removal and washout on 1/24    Of note, review of records show admission to [facility] 11/14/2022 to 11/22/2022 with pyogenic arthritis of the left shoulder, s/p I&D on 11/15 with cx positive for E Coli.  Utox was positive for methadone, opiates, amphetamines, and THC.  Seen by ID at [facility] who recommend IV ceftriaxone X 6 weeks at LTAC, but patient was not agreeable to this.  Offered  daily ceftriaxone infusions at infusion center.  At discharge from there she had received 8 days of ceftriaxone. Review of notes show she was subsequently prescribed 4 weeks of Bactrim 2 DS tabs twice daily.

## 2023-01-22 NOTE — ASSESSMENT & PLAN NOTE
42-year-old woman known to ID with hx of IVDU, chronic HCV with cirrhosis, obesity, neurogenic bladder, chronic pancytopenia, s/p spinal fusion (04/2021), epidural abscess (+ GBS) with removal of hardware (02/2022), and T10-pelvis spinal fusion with hardware (03/2022), s/p 6 weeks IV abx followed by chronic amoxicillin suppression, recent history of pyogenic arthritis of right shoulder s/p  I&D on 11/15/22 with cx positive for E Coli ( incomplete treatment), .who was admitted to [facility]  12/23/22 with back pain and increasing weakness in the lower extremities,  ESBL Coli bacteremia (tx with meropenem until 1/3, blood cx cleared 12/28).  During that admission she developed progressive LE weakness, and was transferred to [facility] for advanced imaging and Neurosurgery evaluation.  See HPI for more detailed hx.       MRI  Imaging on admission here concerning for worsening T9-T10  kyphosis, extensive edema in the paraspinal muscles, possible sacroilitis.   CT with spondylodiscitis T8-9, T9-10.  No signs of infection in left shoulder.   Neurosurgery planning hardware removal and washout on 1/24.     Blood cultures NGTD.  Afebrile.  ESR 70, CRP 9.4.  Urine cx with multiple organisms none in predominance (no urinary symptoms).  She was started on oral amoxicillin (chronic suppressive therapy).     Plan/recommendations:  1.  Continue amoxicillin for now until surgery so long as stable and afebrile   2.  Follow blood cultures  3.  If clinically deteriorates, recommend starting meropenem 2 g IV q 8 hours   4.  Please send surgical cultures (aerobic, anaerobic, fungal and afb)   5.  Will follow    Data reviewed and plan discussed with ID staff, Dr. Hayes

## 2023-01-22 NOTE — CONSULTS
[facility] - Neurosurgery ([facility])  Infectious Disease  Consult Note    Patient Name: Rachel Zazueta  MRN: [id number]  Admission Date: 1/19/2023  Hospital Length of Stay: 2 days  Attending Physician: Oliver Méndez MD  Primary Care Provider: Dannielle Merino DO     Isolation Status: Contact    Patient information was obtained from patient and past medical records.      Consults  Assessment/Plan:     Thoracic discitis     42-year-old woman known to ID with hx of IVDU, chronic HCV with cirrhosis, obesity, neurogenic bladder, chronic pancytopenia, s/p spinal fusion (04/2021), epidural abscess (+ GBS) with removal of hardware (02/2022), and T10-pelvis spinal fusion with hardware (03/2022), s/p 6 weeks IV abx followed by chronic amoxicillin suppression, recent history of pyogenic arthritis of right shoulder s/p  I&D on 11/15/22 with cx positive for E Coli ( incomplete treatment), .who was admitted to [facility]  12/23/22 with back pain and increasing weakness in the lower extremities,  ESBL Coli bacteremia (tx with meropenem until 1/3, blood cx cleared 12/28).  During that admission she developed progressive LE weakness, and was transferred to [facility] for advanced imaging and Neurosurgery evaluation.  See HPI for more detailed hx.       MRI  Imaging on admission here concerning for worsening T9-T10  kyphosis, extensive edema in the paraspinal muscles, possible sacroilitis.   CT with spondylodiscitis T8-9, T9-10.  No signs of infection in left shoulder.   Neurosurgery planning hardware removal and washout on 1/24.     Blood cultures NGTD.  Afebrile.  ESR 70, CRP 9.4.  Urine cx with multiple organisms none in predominance (no urinary symptoms).  She was started on oral amoxicillin (chronic suppressive therapy).     Plan/recommendations:  1.  Continue amoxicillin for now until surgery so long as stable and afebrile   2.  Follow blood cultures  3.  If clinically deteriorates, recommend starting meropenem 2 g IV q 8  hours   4.  Please send surgical cultures (aerobic, anaerobic, fungal and afb)   5.  Will follow    Data reviewed and plan discussed with ID staff, Dr. Hayes          Thank you.   Please Secure Chat for any questions or concerns.  CÉSAR Lloyd, ANP-C      Subjective:     Principal Problem: Weakness of both lower extremities    HPI: Ms. Zazueta is a 42-year-old woman known to ID with hx of IVDU, IV drug use (methamphetamine), chronic HCV with cirrhosis, chronic pancytopenia, spinal fusion (04/2021), epidural abscess (GBS) with removal of hardware (02/2022), T10-pelvis spinal fusion with hardware (03/2022), obesity,  and neurogenic bladder who was admitted to [facility] on 12/23/22 with back pain and increasing weakness in the lower extremities.       Urine and blood cx from that admission + for ESBL E coli which was treated with ertapenem.  Repeat blood cx on 12/28 and 1/10 negative.   Patient started on physical therapy but weakness in the lower extremities increased, and the patient is no longer able to walk. She is able to urinate intermittently and defecate.      An MRI cervical spine 1/10 was significant  for multilevel spondylosis.  MRI brain with nonspecific findings.  Xray of spine notable for focal kyphosis at T9-10 increased when compared to prior scoliosis radiographs.  An MRI of the spine was not available due to the patient's size. and she was transferred to [facility] for advanced imaging and Neurosurgery evaluation.      MRI  imaging here  on 1/20 concerning for worsening kyphosis, extensive edema in the paraspinal muscle. CT with spondylodiscitis T8-9, T9-10    Neurosurgery planning hardware removal and washout on 1/24    Of note, review of records show admission to [facility] 11/14/2022 to 11/22/2022 with pyogenic arthritis of the left shoulder, s/p I&D on 11/15 with cx positive for E Coli.  Utox was positive for methadone, opiates, amphetamines, and THC.  Seen by ID at  [facility] who recommend IV ceftriaxone X 6 weeks at LTAC, but patient was not agreeable to this.  Offered  daily ceftriaxone infusions at infusion center.  At discharge from there she had received 8 days of ceftriaxone. Review of notes show she was subsequently prescribed 4 weeks of Bactrim 2 DS tabs twice daily.       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9    CHOLECYSTECTOMY      KIDNEY SURGERY      LUMBAR FUSION Bilateral 3/2/2022    Procedure: FUSION, SPINE, LUMBAR;  Surgeon: Guille Templeton MD;  Location: [address];  Service: Neurosurgery;  Laterality: Bilateral;  AIRO  T10--Pelvis    REMOVAL OF HARDWARE FROM SPINE N/A 2/21/2022    Procedure: REMOVAL, HARDWARE, SPINE;  Surgeon: Guille Templeton MD;  Location: [facility] OR [address] [place] [address];  Service: Neurosurgery;  Laterality: N/A;  [place]    SPINE SURGERY         Review of patient's allergies indicates:   Allergen Reactions    Bee venom protein (honey bee) Anaphylaxis     Patient reports she is allergic to bee stings.    Naproxen Anaphylaxis     Throat closing    12-23- Patient reports taking Ibuprofen 200 mg at home without problems. Verified X3. [place]    Wasp sting [allergen ext-venom-honey bee] Anaphylaxis    Adhesive Blisters    Iodine and iodide containing products Hives     Allergic to iodine in seafood only    Shellfish containing products     Nuts [tree nut] Rash       Medications:  Medications Prior to Admission   Medication Sig    albuterol (ACCUNEB) 1.25 mg/3 mL Nebu Take 3 mLs (1.25 mg  total) by nebulization every 6 (six) hours as needed (shortness of breath). Rescue    buPROPion (WELLBUTRIN) 100 MG tablet Take 1 tablet (100 mg total) by mouth 2 (two) times daily.    folic acid (FOLVITE) 1 MG tablet Take 1 tablet (1 mg total) by mouth once daily. (Patient not taking: Reported on 11/25/2022)    gabapentin (NEURONTIN) 300 MG capsule Take 1 capsule (300 mg total) by mouth 3 (three) times daily.    lactulose (CHRONULAC) 20 gram/30 mL Soln Take 30 mLs (20 g total) by mouth 3 (three) times daily.    pantoprazole (PROTONIX) 40 MG tablet Take 1 tablet (40 mg total) by mouth once daily.     Antibiotics (From admission, onward)      Start     Stop Route Frequency Ordered    01/19/23 2200  amoxicillin capsule 1,000 mg         -- Oral Every 12 hours 01/19/23 2147          Antifungals (From admission, onward)      None          Antivirals (From admission, onward)      None             Immunization History   Administered Date(s) Administered    COVID-19, MRNA, LN-S, PF (Pfizer) (Purple Cap) 04/30/2021    Influenza - Quadrivalent - PF *Preferred* (6 months and older) 10/05/2018, 01/26/2022, 09/29/2022    Pneumococcal Conjugate - 13 Valent 10/05/2018    Tdap 08/19/2018       Family History       Problem Relation (Age of Onset)    Cirrhosis Father    Drug abuse Mother, Father    Hepatitis Mother, Father    Liver cancer Mother          Social History     Socioeconomic History    Marital status:    Tobacco Use    Smoking status: Some Days     Packs/day: 0.50     Years: 23.00     Pack years: 11.50     Types: Cigarettes    Smokeless tobacco: Never    Tobacco comments:     patient states she knows she nees to quit.   Substance and Sexual Activity    Alcohol use: Not Currently     Comment: quit 2014    Drug use: Yes     Frequency: 4.0 times per week     Types: Marijuana     Comment: Patient denies needle use, only inhalation of methampehtamines or orally.  Last known drug use was prior to  admission to hospital stay for surgery    Sexual activity: Yes     Partners: Male     Birth control/protection: None     Social Determinants of Health     Financial Resource Strain: Medium Risk    Difficulty of Paying Living Expenses: Somewhat hard   Food Insecurity: No Food Insecurity    Worried About Running Out of Food in the Last Year: Never true    Ran Out of Food in the Last Year: Never true   Transportation Needs: Unmet Transportation Needs    Lack of Transportation (Medical): Yes    Lack of Transportation (Non-Medical): Yes   Physical Activity: Inactive    Days of Exercise per Week: 0 days    Minutes of Exercise per Session: 0 min   Stress: Stress Concern Present    Feeling of Stress : Very much   Social Connections: Unknown    Frequency of Communication with Friends and Family: More than three times a week    Frequency of Social Gatherings with Friends and Family: Never    Active Member of Clubs or Organizations: No    Attends Club or Organization Meetings: Never    Marital Status:    Housing Stability: High Risk    Unable to Pay for Housing in the Last Year: No    Number of Places Lived in the Last Year: 1    Unstable Housing in the Last Year: Yes     Review of Systems   Constitutional:  Positive for activity change and fatigue. Negative for appetite change, chills, diaphoresis and fever.   HENT: Negative.     Eyes:  Positive for visual disturbance.   Respiratory:  Negative for cough, shortness of breath and wheezing.    Cardiovascular:  Negative for chest pain and leg swelling.   Gastrointestinal:  Negative for abdominal pain, constipation, diarrhea, nausea and vomiting.   Genitourinary:  Negative for difficulty urinating and dysuria.   Musculoskeletal:  Positive for back pain (across mid back). Negative for arthralgias and neck pain.   Skin:  Negative for rash and wound.   Neurological:  Positive for weakness (bilateral lower extremity weakness L>R) and numbness. Negative for  dizziness and headaches.   Psychiatric/Behavioral:  Negative for agitation and confusion.    Objective:     Vital Signs (Most Recent):  Temp: 97.5 °F (36.4 °C) (01/21/23 1931)  Pulse: 107 (01/21/23 1931)  Resp: 16 (01/21/23 1931)  BP: 113/70 (01/21/23 1931)  SpO2: 95 % (01/21/23 1931) Vital Signs (24h Range):  Temp:  [97.5 °F (36.4 °C)-98.6 °F (37 °C)] 97.5 °F (36.4 °C)  Pulse:  [] 107  Resp:  [16-19] 16  SpO2:  [91 %-97 %] 95 %  BP: ()/(52-70) 113/70     Weight: 102.1 kg (225 lb)  Body mass index is 35.24 kg/m².    Estimated Creatinine Clearance: 180 mL/min (based on SCr of 0.5 mg/dL).    Physical Exam  Vitals and nursing note reviewed.   Constitutional:       General: She is not in acute distress.     Appearance: She is obese. She is not ill-appearing, toxic-appearing or diaphoretic.   HENT:      Head: Normocephalic.      Mouth/Throat:      Mouth: Mucous membranes are moist.   Eyes:      General: No scleral icterus.     Conjunctiva/sclera: Conjunctivae normal.   Cardiovascular:      Rate and Rhythm: Normal rate and regular rhythm.      Heart sounds: Murmur heard.   Pulmonary:      Effort: Pulmonary effort is normal. No respiratory distress.      Breath sounds: Normal breath sounds. No wheezing or rales.   Abdominal:      General: There is no distension.      Palpations: Abdomen is soft.      Tenderness: There is no abdominal tenderness. There is no guarding.   Musculoskeletal:         General: No tenderness.      Cervical back: No rigidity or tenderness.      Right lower leg: No edema.      Left lower leg: No edema.      Comments: Left shoulder with well healed arthroscopy scar.  No tenderness, warmth, swelling, or erythema.    Skin:     General: Skin is warm and dry.      Comments: Bilateral lower extremity stasis changes   tattoos   Neurological:      Mental Status: She is alert and oriented to person, place, and time. Mental status is at baseline.      Sensory: Sensory deficit (Decreased BLE)  present.      Motor: Weakness (Bilateral LE L>R) present.   Psychiatric:         Mood and Affect: Mood normal.         Behavior: Behavior normal.       Significant Labs: Blood Culture:   Recent Labs   Lab 01/10/23  0616 01/10/23  0621 01/10/23  2344 01/10/23  2348 01/20/23  1131   LABBLOO No growth after 5 days. No growth after 5 days. No growth after 5 days. No growth after 5 days. No Growth to date  No Growth to date  No Growth to date  No Growth to date     CBC:   Recent Labs   Lab 01/20/23  0543 01/21/23  0319   WBC 2.85* 2.08*   HGB 9.3* 8.9*   HCT 31.0* 29.5*   * 83*     CMP:   Recent Labs   Lab 01/20/23 0543 01/21/23 0319    135*   K 4.2 3.7    105   CO2 22* 24   GLU 87 74   BUN 20 16   CREATININE 0.6 0.5   CALCIUM 8.6* 8.3*   PROT 7.4 6.9   ALBUMIN 2.2* 2.0*   BILITOT 1.6* 1.6*   ALKPHOS 128 121   AST 43* 39   ALT 23 22   ANIONGAP 8 6*     Urine Culture:   Recent Labs   Lab 12/23/22  0501 01/20/23  0130   LABURIN JACINTA ALBICANS  50,000 - 99,999 cfu/ml  Treatment of asymptomatic candiduria is not recommended (except for   specific populations). Candida isolated in the urine typically   represents colonization. If an indwelling urinary catheter is present  it should be removed or replaced.  * Multiple organisms isolated. None in predominance.  Repeat if  clinically necessary.     Urine Studies:   Recent Labs   Lab 12/23/22  0501 01/20/23  0130   COLORU Yellow Yellow   APPEARANCEUA Clear Clear   PHUR 6.0 7.0   SPECGRAV 1.025 1.025   PROTEINUA Trace* Trace*   GLUCUA Negative Negative   KETONESU Negative Negative   BILIRUBINUA 1+* Negative   OCCULTUA Negative 2+*   NITRITE Negative Negative   UROBILINOGEN 1.0  --    LEUKOCYTESUR 1+* 2+*   RBCUA 6* >100*   WBCUA 13* 76*   BACTERIA Negative Occasional   SQUAMEPITHEL 1 6   HYALINECASTS 2.3*  --      Wound Culture:   Recent Labs   Lab 11/15/22  1301   LABAERO ESCHERICHIA COLI  Few  *       Significant Imaging: I have reviewed all pertinent  imaging results/findings within the past 24 hours.

## 2023-01-22 NOTE — SUBJECTIVE & OBJECTIVE
After Visit Summary   11/15/2017    Amalia Harris    MRN: [id number]           Patient Information     Date Of Birth          1952        Visit Information        Provider Department      11/15/2017 3:15 PM Lien Sands MD; [place] [employer] [facility]        Today's Diagnoses     Calculus of kidney    -  1    Screen for colon cancer        Asymptomatic postmenopausal status        Visit for screening mammogram        At risk for falling        Need for prophylactic vaccination against Streptococcus pneumoniae (pneumococcus)        Hypertension goal BP (blood pressure) < 140/90        Numbness and tingling of hand          Care Instructions    Based on your medical history and these are the current health maintenance or preventive care services that you are due for (some may have been done at this visit)  Health Maintenance Due   Topic Date Due     PHQ-9 Q6 MONTHS  11/25/2016     FALL RISK ASSESSMENT  04/06/2017     DEXA SCAN SCREENING (SYSTEM ASSIGNED)  04/06/2017     PNEUMOCOCCAL (1 of 2 - PCV13) 04/06/2017     LIPID MONITORING Q1 YEAR  05/11/2017     DEPRESSION ACTION PLAN Q1 YR  05/11/2017     FIT Q1 YR  05/25/2017     MAMMO SCREEN Q2 YR (SYSTEM ASSIGNED)  07/22/2017     INFLUENZA VACCINE (SYSTEM ASSIGNED)  09/01/2017         At [facility], we strive to deliver an exceptional experience to you, every time we see you.    If you receive a survey in the mail, please send us back your thoughts. We really do value your feedback.    Your care team's suggested websites for health information:  Www.[employer].org : Up to date and easily searchable information on multiple topics.  Www.medlineplus.gov : medication info, interactive tutorials, watch real surgeries online  Www.familydoctor.org : good info from the Academy of Family Physicians  Www.cdc.gov : public health info, travel advisories, epidemics (H1N1)  Www.aap.org : children's  Interval History: Reports paraspinal muscle spasms pain aided significantly by muscle relaxers. Denies fevers, chills, or other complaints. Remains weak in BLLE.     Review of Systems   Constitutional:  Negative for fever.   HENT:  Negative for congestion and rhinorrhea.    Eyes:  Negative for visual disturbance.   Respiratory:  Negative for shortness of breath.    Cardiovascular:  Negative for chest pain.   Gastrointestinal:  Negative for abdominal distention, abdominal pain, nausea and vomiting.   Genitourinary:  Negative for dysuria and hematuria.   Musculoskeletal:  Positive for back pain. Negative for neck pain.   Neurological:  Positive for weakness. Negative for dizziness, seizures, light-headedness, numbness and headaches.   Objective:     Vital Signs (Most Recent):  Temp: 98.7 °F (37.1 °C) (01/22/23 0442)  Pulse: 104 (01/22/23 0442)  Resp: 18 (01/22/23 0442)  BP: 106/65 (01/22/23 0442)  SpO2: (!) 93 % (01/22/23 0442)   Vital Signs (24h Range):  Temp:  [97.5 °F (36.4 °C)-98.7 °F (37.1 °C)] 98.7 °F (37.1 °C)  Pulse:  [] 104  Resp:  [16-18] 18  SpO2:  [93 %-97 %] 93 %  BP: (106-123)/(62-70) 106/65     Weight: 104 kg (229 lb 4.5 oz)  Body mass index is 35.91 kg/m².    Intake/Output Summary (Last 24 hours) at 1/22/2023 0856  Last data filed at 1/21/2023 2008  Gross per 24 hour   Intake 700 ml   Output --   Net 700 ml      Physical Exam  Vitals and nursing note reviewed.   Constitutional:       General: She is not in acute distress.     Appearance: She is obese. She is not ill-appearing, toxic-appearing or diaphoretic.   HENT:      Head: Normocephalic.      Mouth/Throat:      Mouth: Mucous membranes are moist.   Eyes:      General: No scleral icterus.        Right eye: No discharge.         Left eye: No discharge.   Cardiovascular:      Rate and Rhythm: Normal rate and regular rhythm.      Heart sounds: Murmur heard.   Pulmonary:      Effort: Pulmonary effort is normal. No respiratory distress.      Breath  health info, normal development, vaccinations  Www.health.state.[place] : [place] dept of health, public health issues in [place], N1N1    How to contact your care team:   Team [facility] [phone]         Pharmacy [phone]    Dr. Dickinson, Mariluz Geronimo PA-C, Dr. Woodall, Chelsea Parra APRN CNP, Bria Lange PA-C, Dr. Bell, and BRII Bangura CNP    Team RN: Lyric      Clinic hours  M-Th 7 am-7 pm   Fri 7 am-5 pm.   Urgent care M-F 11 am-9 pm,   Sat/Sun 9 am-5 pm.  Pharmacy M-Th 8 am-8 pm Fri 8 am-6 pm  Sat/Sun 9 am-5 pm.     All password changes, disabled accounts, or ID changes in [employer]MyHealth will be done by our Access Services Department.    If you need help with your account or password, call: [phone]. Clinic staff no longer has the ability to change passwords.             Follow-ups after your visit        Additional Services     NEUROLOGY ADULT REFERRAL       Your provider has referred you for the following:   EMG at [facility]: [facility] [place] [phone]   [email or web address] and [facility]: [facility] [place] [phone]   [email or web address]    Please be aware that coverage of these services is subject to the terms and limitations of your health insurance plan.  Call member services at your health plan with any benefit or coverage questions.      Please bring the following with you to your appointment:    (1) Any X-Rays, CTs or MRIs which have been performed.  Contact the facility where they were done to arrange for  prior to your scheduled appointment.    (2) List of current medications  (3) This referral request   (4) Any documents/labs given to you for this referral                  Future tests that were ordered for you today     Open Future Orders        Priority Expected Expires Ordered    DEXA HIP/PELVIS/SPINE - Future Routine  11/15/2018 11/15/2017    MA  sounds: Normal breath sounds.   Abdominal:      General: Bowel sounds are normal.      Palpations: Abdomen is soft.      Tenderness: There is no abdominal tenderness.   Musculoskeletal:      Cervical back: No rigidity.      Right lower leg: No edema (Mild).      Left lower leg: No edema (Mild).   Skin:     General: Skin is warm and dry.      Coloration: Skin is not jaundiced.   Neurological:      Mental Status: She is alert and oriented to person, place, and time. Mental status is at baseline.      Motor: Weakness (Bilateral LE) and tremor (Bilateral UE) present.      Deep Tendon Reflexes: Reflexes abnormal. Babinski sign present on the right side. Babinski sign present on the left side.   Psychiatric:         Mood and Affect: Mood normal.         Behavior: Behavior normal.       Significant Labs: All pertinent labs within the past 24 hours have been reviewed.  CBC:   Recent Labs   Lab 01/21/23 0319 01/22/23 0427   WBC 2.08* 2.35*   HGB 8.9* 8.8*   HCT 29.5* 29.4*   PLT 83* 83*     CMP:   Recent Labs   Lab 01/21/23 0319 01/22/23 0427   * 137   K 3.7 4.0    107   CO2 24 24   GLU 74 66*   BUN 16 14   CREATININE 0.5 0.5   CALCIUM 8.3* 8.6*   PROT 6.9 7.2   ALBUMIN 2.0* 2.1*   BILITOT 1.6* 1.8*   ALKPHOS 121 117   AST 39 39   ALT 22 20   ANIONGAP 6* 6*       Significant Imaging: I have reviewed all pertinent imaging results/findings within the past 24 hours.   "SCREENING DIGITAL BILAT - Future  (s+30) Routine  11/15/2018 11/15/2017            Who to contact     If you have questions or need follow up information about today's clinic visit or your schedule please contact [facility] [place] directly at [phone].  Normal or non-critical lab and imaging results will be communicated to you by MyChart, letter or phone within 4 business days after the clinic has received the results. If you do not hear from us within 7 days, please contact the clinic through MyChart or phone. If you have a critical or abnormal lab result, we will notify you by phone as soon as possible.  Submit refill requests through [employer] or call your pharmacy and they will forward the refill request to us. Please allow 3 business days for your refill to be completed.          Additional Information About Your Visit        MyChart Information     [employer] lets you send messages to your doctor, view your test results, renew your prescriptions, schedule appointments and more. To sign up, go to [email or web address] . Click on \"Log in\" on the left side of the screen, which will take you to the Welcome page. Then click on \"Sign up Now\" on the right side of the page.     You will be asked to enter the access code listed below, as well as some personal information. Please follow the directions to create your username and password.     Your access code is: [email or web address]  Expires: 2018  3:23 PM     Your access code will  in 90 days. If you need help or a new code, please call your [place] clinic or [phone].        Care EveryWhere ID     This is your Care EveryWhere ID. This could be used by other organizations to access your [place] medical records  [id number]        Your Vitals Were     Pulse Temperature Height Pulse Oximetry Breastfeeding? BMI (Body Mass Index)    78 97.7  F (36.5  C) (Oral) 4' 10\" (1.473 m) 98% No 36.16 kg/m2       Blood Pressure from Last 3 Encounters: "   11/15/17 136/72   05/25/16 126/82   05/11/16 134/82    Weight from Last 3 Encounters:   11/15/17 173 lb (78.5 kg)   05/25/16 176 lb 9.6 oz (80.1 kg)   05/11/16 176 lb 12.8 oz (80.2 kg)              We Performed the Following     Comprehensive metabolic panel     Lipid panel reflex to direct LDL Non-fasting     NEUROLOGY ADULT REFERRAL     PNEUMOCOCCAL CONJ VACCINE 13 VALENT IM     Vitamin B12          Today's Medication Changes          These changes are accurate as of: 11/15/17  3:58 PM.  If you have any questions, ask your nurse or doctor.               Start taking these medicines.        Dose/Directions    losartan 25 MG tablet   Commonly known as:  COZAAR   Used for:  Hypertension goal BP (blood pressure) < 140/90   Started by:  Lien Sands MD        Dose:  25 mg   Take 1 tablet (25 mg) by mouth daily   Quantity:  90 tablet   Refills:  1         Stop taking these medicines if you haven't already. Please contact your care team if you have questions.     lisinopril 10 MG tablet   Commonly known as:  PRINIVIL/ZESTRIL   Stopped by:  Lien Sands MD                Where to get your medicines      These [place] [facility] [place] [address]  [address], [facility] [address]     Phone:  [phone]     losartan 25 MG tablet                Primary Care Provider Office Phone # Fax #    Lien Link [phone]       [address]  [place] [address]        Equal Access to Services     [facility]: Hadii aad ku hadasho Soomaali, waaxda luqadaha, qaybta kaalmada adeegyada, leanna sterling. So [facility] [phone].    ATENCIÓN: Si habla español, tiene a canchola disposición servicios gratuitos de asistencia lingüística. Héctor marin [phone].    We comply with applicable federal civil rights laws and [place] laws. We do not discriminate on the basis  of race, color, national origin, age, disability, sex, sexual orientation, or gender identity.            Thank you!     Thank you for choosing [facility]  for your care. Our goal is always to provide you with excellent care. Hearing back from our patients is one way we can continue to improve our services. Please take a few minutes to complete the written survey that you may receive in the mail after your visit with us. Thank you!             Your Updated Medication List - Protect others around you: Learn how to safely use, store and throw away your medicines at www.disposemymeds.org.          This list is accurate as of: 11/15/17  3:58 PM.  Always use your most recent med list.                   Brand Name Dispense Instructions for use Diagnosis    furosemide 20 MG tablet    LASIX     Take 20 mg by mouth daily        losartan 25 MG tablet    COZAAR    90 tablet    Take 1 tablet (25 mg) by mouth daily    Hypertension goal BP (blood pressure) < 140/90       lovastatin 40 MG tablet    MEVACOR    90 tablet    Take 1 tablet (40 mg) by mouth At Bedtime Needs to be seen for more.    Hyperlipidemia LDL goal <130       nystatin cream    MYCOSTATIN    60 g    Apply topically 3 times daily    Intertriginous candidiasis       oxyCODONE IR 5 MG tablet    ROXICODONE          oxyCODONE-acetaminophen 5-325 MG per tablet    PERCOCET          tamsulosin 0.4 MG capsule    FLOMAX

## 2023-01-22 NOTE — NURSING
RN went into room to check on pt at 1915 for shift change. Pt appeared to be sleeping in bed  and did not respond to RN speaking her name. RN updated whiteboard and left room.     CNA entered room to take pt's vitals at 1930. When CNA woke pt to take vitals, pt began crying out (could be heard from hallway) and requesting pain medication. RN notified and reached out to primary team.

## 2023-01-22 NOTE — SUBJECTIVE & OBJECTIVE
Interval History: 1/22 naeon, plan for OR Tuesday    Medications:  Continuous Infusions:  Scheduled Meds:   amoxicillin  1,000 mg Oral Q12H    buPROPion  300 mg Oral Daily    enoxaparin  40 mg Subcutaneous Daily    gabapentin  400 mg Oral TID    lactulose  20 g Oral TID    pantoprazole  40 mg Oral Daily    polyethylene glycol  17 g Oral Daily    potassium chloride  20 mEq Oral Daily     PRN Meds:acetaminophen, dextrose 10%, dextrose 10%, glucagon (human recombinant), glucose, glucose, HYDROcodone-acetaminophen, LORazepam, magnesium hydroxide 400 mg/5 ml, melatonin, methocarbamoL, naloxone, promethazine, sodium chloride 0.9%     Review of Systems  Objective:     Weight: 104 kg (229 lb 4.5 oz)  Body mass index is 35.91 kg/m².  Vital Signs (Most Recent):  Temp: 98.7 °F (37.1 °C) (01/22/23 0442)  Pulse: 104 (01/22/23 0442)  Resp: 18 (01/22/23 0442)  BP: 106/65 (01/22/23 0442)  SpO2: (!) 93 % (01/22/23 0442) Vital Signs (24h Range):  Temp:  [97.5 °F (36.4 °C)-98.7 °F (37.1 °C)] 98.7 °F (37.1 °C)  Pulse:  [] 104  Resp:  [16-18] 18  SpO2:  [93 %-97 %] 93 %  BP: (106-123)/(56-70) 106/65                              Neurosurgery Physical Exam  General: well developed, well nourished, no distress.   Head: normocephalic, atraumatic  Neurologic: Alert and oriented. Thought content appropriate.  GCS: Motor: 6/Verbal: 5/Eyes: 4 GCS Total: 15  Mental Status: Awake, Alert, Oriented x 4  Language: No aphasia  Speech: No dysarthria  Cranial nerves: face symmetric, tongue midline, CN II-XII grossly intact.   Eyes: pupils equal, round, reactive to light with accommodation, EOMI, nystagmus.   Pulmonary: normal respirations, no signs of respiratory distress  Abdomen: soft, non-distended, not tender to palpation  Skin: Skin is warm, dry and intact.  Sensory: intact to light touch throughout     Motor Strength:Moves all extremities spontaneously with good tone.  Full strength upper and lower extremities. No abnormal movements seen.       Strength   Deltoids Triceps Biceps Wrist Extension Wrist Flexion Hand    Upper: R 5/5 5/5 5/5 5/5 5/5 5/5     L 5/5 5/5 5/5 5/5 5/5 5/5       Iliopsoas Quadriceps Knee  Flexion Tibialis  anterior Gastro- cnemius EHL   Lower: R 2/5 1/5 2/5 1/5 1/5 1/5     L 2/5 2/5 1/5 1/5 1/5 1/5      Reflexes:   DTR: 2+ symmetrically throughout.  Parker's: Negative.  Babinski's: Negative.  Clonus: 2 beats bilateral lower extremity     Incision well healed       Significant Labs:  Recent Labs   Lab 01/21/23 0319 01/22/23  0427   GLU 74 66*   * 137   K 3.7 4.0    107   CO2 24 24   BUN 16 14   CREATININE 0.5 0.5   CALCIUM 8.3* 8.6*   MG 1.6 1.6       Recent Labs   Lab 01/21/23 0319   WBC 2.08*   HGB 8.9*   HCT 29.5*   PLT 83*       No results for input(s): LABPT, INR, APTT in the last 48 hours.    Microbiology Results (last 7 days)       Procedure Component Value Units Date/Time    Blood culture [[id number]] Collected: 01/20/23 1131    Order Status: Completed Specimen: Blood Updated: 01/21/23 1412     Blood Culture, Routine No Growth to date      No Growth to date    Narrative:      Collection has been rescheduled by [facility] at 01/20/2023 11:03 Reason:   Patient unavailable in room with doctors   Collection has been rescheduled by 2 at 01/20/2023 11:03 Reason:   Patient unavailable in room with doctors     Blood culture [[id number]] Collected: 01/20/23 1131    Order Status: Completed Specimen: Blood Updated: 01/21/23 1412     Blood Culture, Routine No Growth to date      No Growth to date    Narrative:      Collection has been rescheduled by 2 at 01/20/2023 11:03 Reason:   Patient unavailable in room with doctors   Collection has been rescheduled by [facility] at 01/20/2023 11:03 Reason:   Patient unavailable in room with doctors     Urine culture [[id number]] Collected: 01/20/23 0130    Order Status: Completed Specimen: Urine Updated: 01/21/23 1144     Urine Culture, Routine Multiple organisms isolated. None in  predominance.  Repeat if      clinically necessary.    Narrative:      Specimen Source->Urine    Blood culture [[id number]]     Order Status: Canceled Specimen: Blood     Culture, Respiratory with Gram Stain [[id number]]     Order Status: Canceled Specimen: Respiratory           All pertinent labs from the last 24 hours have been reviewed.    Significant Diagnostics:  CT C-spine:  Vertebrae: The dens, lateral masses, and occipital condyles are intact.  There is no evidence of fracture or dislocation.     Discs: Multilevel disc height loss and degenerative endplate change.  Prominent C6 inferior endplate Schmorl's node, similar in appearance dating back to MRI from 02/14/2022.     Degenerative changes: Sent spinal canal dimensions at C3-C4 are 10.5 mm, 8.5 mm at C4-C5, 10.0 mm at C5-C6, and 10.8 mm at C6-C7 .  Multilevel uncovertebral spurring with multilevel neural foraminal narrowing most pronounced at C5-C6 with moderate right neural foraminal narrowing and C6-C7 with moderate left neural foraminal narrowing.  Physical Exam  Neurosurgery Physical Exam

## 2023-01-22 NOTE — SUBJECTIVE & OBJECTIVE
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9    CHOLECYSTECTOMY      KIDNEY SURGERY      LUMBAR FUSION Bilateral 3/2/2022    Procedure: FUSION, SPINE, LUMBAR;  Surgeon: Guille Templeton MD;  Location: [address];  Service: Neurosurgery;  Laterality: Bilateral;  AIRO  T10--Pelvis    REMOVAL OF HARDWARE FROM SPINE N/A 2/21/2022    Procedure: REMOVAL, HARDWARE, SPINE;  Surgeon: Guille Templeton MD;  Location: [address];  Service: Neurosurgery;  Laterality: N/A;  [place]    SPINE SURGERY         Review of patient's allergies indicates:   Allergen Reactions    Bee venom protein (honey bee) Anaphylaxis     Patient reports she is allergic to bee stings.    Naproxen Anaphylaxis     Throat closing    12-23- Patient reports taking Ibuprofen 200 mg at home without problems. Verified X3. [place]    Wasp sting [allergen ext-venom-honey bee] Anaphylaxis    Adhesive Blisters    Iodine and iodide containing products Hives     Allergic to iodine in seafood only    Shellfish containing products     Nuts [tree nut] Rash       Medications:  Medications Prior to Admission   Medication Sig    albuterol (ACCUNEB) 1.25 mg/3 mL Nebu Take 3 mLs (1.25 mg total) by nebulization every 6 (six) hours as needed (shortness of breath). Rescue    buPROPion (WELLBUTRIN) 100 MG tablet Take 1 tablet (100 mg total) by mouth 2 (two) times daily.    folic acid (FOLVITE) 1 MG tablet Take 1 tablet (1 mg total) by mouth once daily. (Patient not taking: Reported on 11/25/2022)     gabapentin (NEURONTIN) 300 MG capsule Take 1 capsule (300 mg total) by mouth 3 (three) times daily.    lactulose (CHRONULAC) 20 gram/30 mL Soln Take 30 mLs (20 g total) by mouth 3 (three) times daily.    pantoprazole (PROTONIX) 40 MG tablet Take 1 tablet (40 mg total) by mouth once daily.     Antibiotics (From admission, onward)      Start     Stop Route Frequency Ordered    01/19/23 2200  amoxicillin capsule 1,000 mg         -- Oral Every 12 hours 01/19/23 2147          Antifungals (From admission, onward)      None          Antivirals (From admission, onward)      None             Immunization History   Administered Date(s) Administered    COVID-19, MRNA, LN-S, PF (Pfizer) (Purple Cap) 04/30/2021    Influenza - Quadrivalent - PF *Preferred* (6 months and older) 10/05/2018, 01/26/2022, 09/29/2022    Pneumococcal Conjugate - 13 Valent 10/05/2018    Tdap 08/19/2018       Family History       Problem Relation (Age of Onset)    Cirrhosis Father    Drug abuse Mother, Father    Hepatitis Mother, Father    Liver cancer Mother          Social History     Socioeconomic History    Marital status:    Tobacco Use    Smoking status: Some Days     Packs/day: 0.50     Years: 23.00     Pack years: 11.50     Types: Cigarettes    Smokeless tobacco: Never    Tobacco comments:     patient states she knows she nees to quit.   Substance and Sexual Activity    Alcohol use: Not Currently     Comment: quit 2014    Drug use: Yes     Frequency: 4.0 times per week     Types: Marijuana     Comment: Patient denies needle use, only inhalation of methampehtamines or orally.  Last known drug use was prior to admission to hospital stay for surgery    Sexual activity: Yes     Partners: Male     Birth control/protection: None     Social Determinants of Health     Financial Resource Strain: Medium Risk    Difficulty of Paying Living Expenses: Somewhat hard   Food Insecurity: No Food Insecurity    Worried About Running Out of  Food in the Last Year: Never true    Ran Out of Food in the Last Year: Never true   Transportation Needs: Unmet Transportation Needs    Lack of Transportation (Medical): Yes    Lack of Transportation (Non-Medical): Yes   Physical Activity: Inactive    Days of Exercise per Week: 0 days    Minutes of Exercise per Session: 0 min   Stress: Stress Concern Present    Feeling of Stress : Very much   Social Connections: Unknown    Frequency of Communication with Friends and Family: More than three times a week    Frequency of Social Gatherings with Friends and Family: Never    Active Member of Clubs or Organizations: No    Attends Club or Organization Meetings: Never    Marital Status:    Housing Stability: High Risk    Unable to Pay for Housing in the Last Year: No    Number of Places Lived in the Last Year: 1    Unstable Housing in the Last Year: Yes     Review of Systems   Constitutional:  Positive for activity change and fatigue. Negative for appetite change, chills, diaphoresis and fever.   HENT: Negative.     Eyes:  Positive for visual disturbance.   Respiratory:  Negative for cough, shortness of breath and wheezing.    Cardiovascular:  Negative for chest pain and leg swelling.   Gastrointestinal:  Negative for abdominal pain, constipation, diarrhea, nausea and vomiting.   Genitourinary:  Negative for difficulty urinating and dysuria.   Musculoskeletal:  Positive for back pain (across mid back). Negative for arthralgias and neck pain.   Skin:  Negative for rash and wound.   Neurological:  Positive for weakness (bilateral lower extremity weakness L>R) and numbness. Negative for dizziness and headaches.   Psychiatric/Behavioral:  Negative for agitation and confusion.    Objective:     Vital Signs (Most Recent):  Temp: 97.5 °F (36.4 °C) (01/21/23 1931)  Pulse: 107 (01/21/23 1931)  Resp: 16 (01/21/23 1931)  BP: 113/70 (01/21/23 1931)  SpO2: 95 % (01/21/23 1931) Vital Signs (24h Range):  Temp:  [97.5  °F (36.4 °C)-98.6 °F (37 °C)] 97.5 °F (36.4 °C)  Pulse:  [] 107  Resp:  [16-19] 16  SpO2:  [91 %-97 %] 95 %  BP: ()/(52-70) 113/70     Weight: 102.1 kg (225 lb)  Body mass index is 35.24 kg/m².    Estimated Creatinine Clearance: 180 mL/min (based on SCr of 0.5 mg/dL).    Physical Exam  Vitals and nursing note reviewed.   Constitutional:       General: She is not in acute distress.     Appearance: She is obese. She is not ill-appearing, toxic-appearing or diaphoretic.   HENT:      Head: Normocephalic.      Mouth/Throat:      Mouth: Mucous membranes are moist.   Eyes:      General: No scleral icterus.     Conjunctiva/sclera: Conjunctivae normal.   Cardiovascular:      Rate and Rhythm: Normal rate and regular rhythm.      Heart sounds: Murmur heard.   Pulmonary:      Effort: Pulmonary effort is normal. No respiratory distress.      Breath sounds: Normal breath sounds. No wheezing or rales.   Abdominal:      General: There is no distension.      Palpations: Abdomen is soft.      Tenderness: There is no abdominal tenderness. There is no guarding.   Musculoskeletal:         General: No tenderness.      Cervical back: No rigidity or tenderness.      Right lower leg: No edema.      Left lower leg: No edema.      Comments: Left shoulder with well healed arthroscopy scar.  No tenderness, warmth, swelling, or erythema.    Skin:     General: Skin is warm and dry.      Comments: Bilateral lower extremity stasis changes   tattoos   Neurological:      Mental Status: She is alert and oriented to person, place, and time. Mental status is at baseline.      Sensory: Sensory deficit (Decreased BLE) present.      Motor: Weakness (Bilateral LE L>R) present.   Psychiatric:         Mood and Affect: Mood normal.         Behavior: Behavior normal.       Significant Labs: Blood Culture:   Recent Labs   Lab 01/10/23  0616 01/10/23  0621 01/10/23  2344 01/10/23  2348 01/20/23  1131   LABBLOO No growth after 5 days. No growth after 5  days. No growth after 5 days. No growth after 5 days. No Growth to date  No Growth to date  No Growth to date  No Growth to date     CBC:   Recent Labs   Lab 01/20/23  0543 01/21/23  0319   WBC 2.85* 2.08*   HGB 9.3* 8.9*   HCT 31.0* 29.5*   * 83*     CMP:   Recent Labs   Lab 01/20/23  0543 01/21/23  0319    135*   K 4.2 3.7    105   CO2 22* 24   GLU 87 74   BUN 20 16   CREATININE 0.6 0.5   CALCIUM 8.6* 8.3*   PROT 7.4 6.9   ALBUMIN 2.2* 2.0*   BILITOT 1.6* 1.6*   ALKPHOS 128 121   AST 43* 39   ALT 23 22   ANIONGAP 8 6*     Urine Culture:   Recent Labs   Lab 12/23/22  0501 01/20/23  0130   LABURIN JACINTA ALBICANS  50,000 - 99,999 cfu/ml  Treatment of asymptomatic candiduria is not recommended (except for   specific populations). Candida isolated in the urine typically   represents colonization. If an indwelling urinary catheter is present  it should be removed or replaced.  * Multiple organisms isolated. None in predominance.  Repeat if  clinically necessary.     Urine Studies:   Recent Labs   Lab 12/23/22  0501 01/20/23  0130   COLORU Yellow Yellow   APPEARANCEUA Clear Clear   PHUR 6.0 7.0   SPECGRAV 1.025 1.025   PROTEINUA Trace* Trace*   GLUCUA Negative Negative   KETONESU Negative Negative   BILIRUBINUA 1+* Negative   OCCULTUA Negative 2+*   NITRITE Negative Negative   UROBILINOGEN 1.0  --    LEUKOCYTESUR 1+* 2+*   RBCUA 6* >100*   WBCUA 13* 76*   BACTERIA Negative Occasional   SQUAMEPITHEL 1 6   HYALINECASTS 2.3*  --      Wound Culture:   Recent Labs   Lab 11/15/22  1301   LABAERO ESCHERICHIA COLI  Few  *       Significant Imaging: I have reviewed all pertinent imaging results/findings within the past 24 hours.

## 2023-01-23 ENCOUNTER — ANESTHESIA EVENT (OUTPATIENT)
Dept: SURGERY | Facility: HOSPITAL | Age: 43
DRG: 453 | End: 2023-01-23
Payer: MEDICAID

## 2023-01-23 LAB
ABO + RH BLD: NORMAL
ANISOCYTOSIS BLD QL SMEAR: SLIGHT
APTT BLDCRRT: 29.1 SEC (ref 21–32)
BASOPHILS NFR BLD: 0 % (ref 0–1.9)
BLD GP AB SCN CELLS X3 SERPL QL: NORMAL
DACRYOCYTES BLD QL SMEAR: ABNORMAL
DIFFERENTIAL METHOD: ABNORMAL
EOSINOPHIL NFR BLD: 1 % (ref 0–8)
ERYTHROCYTE [DISTWIDTH] IN BLOOD BY AUTOMATED COUNT: 17.6 % (ref 11.5–14.5)
HCT VFR BLD AUTO: 30.7 % (ref 37–48.5)
HGB BLD-MCNC: 8.9 G/DL (ref 12–16)
IMM GRANULOCYTES # BLD AUTO: ABNORMAL K/UL (ref 0–0.04)
IMM GRANULOCYTES NFR BLD AUTO: ABNORMAL % (ref 0–0.5)
INR PPP: 1.3 (ref 0.8–1.2)
LYMPHOCYTES NFR BLD: 20 % (ref 18–48)
MCH RBC QN AUTO: 27.1 PG (ref 27–31)
MCHC RBC AUTO-ENTMCNC: 29 G/DL (ref 32–36)
MCV RBC AUTO: 93 FL (ref 82–98)
MONOCYTES NFR BLD: 7 % (ref 4–15)
NEUTROPHILS NFR BLD: 71 % (ref 38–73)
NEUTS BAND NFR BLD MANUAL: 1 %
NRBC BLD-RTO: 0 /100 WBC
OVALOCYTES BLD QL SMEAR: ABNORMAL
PLATELET # BLD AUTO: 88 K/UL (ref 150–450)
PMV BLD AUTO: 10.2 FL (ref 9.2–12.9)
POIKILOCYTOSIS BLD QL SMEAR: SLIGHT
POLYCHROMASIA BLD QL SMEAR: ABNORMAL
PROTHROMBIN TIME: 13.1 SEC (ref 9–12.5)
RBC # BLD AUTO: 3.29 M/UL (ref 4–5.4)
WBC # BLD AUTO: 1.82 K/UL (ref 3.9–12.7)

## 2023-01-23 PROCEDURE — 25000003 PHARM REV CODE 250: Performed by: STUDENT IN AN ORGANIZED HEALTH CARE EDUCATION/TRAINING PROGRAM

## 2023-01-23 PROCEDURE — 36415 COLL VENOUS BLD VENIPUNCTURE: CPT | Performed by: STUDENT IN AN ORGANIZED HEALTH CARE EDUCATION/TRAINING PROGRAM

## 2023-01-23 PROCEDURE — 25000003 PHARM REV CODE 250

## 2023-01-23 PROCEDURE — 99233 PR SUBSEQUENT HOSPITAL CARE,LEVL III: ICD-10-PCS | Mod: ,,, | Performed by: STUDENT IN AN ORGANIZED HEALTH CARE EDUCATION/TRAINING PROGRAM

## 2023-01-23 PROCEDURE — 86900 BLOOD TYPING SEROLOGIC ABO: CPT | Performed by: STUDENT IN AN ORGANIZED HEALTH CARE EDUCATION/TRAINING PROGRAM

## 2023-01-23 PROCEDURE — 85730 THROMBOPLASTIN TIME PARTIAL: CPT | Performed by: STUDENT IN AN ORGANIZED HEALTH CARE EDUCATION/TRAINING PROGRAM

## 2023-01-23 PROCEDURE — 11000001 HC ACUTE MED/SURG PRIVATE ROOM

## 2023-01-23 PROCEDURE — 85007 BL SMEAR W/DIFF WBC COUNT: CPT | Performed by: STUDENT IN AN ORGANIZED HEALTH CARE EDUCATION/TRAINING PROGRAM

## 2023-01-23 PROCEDURE — 99233 SBSQ HOSP IP/OBS HIGH 50: CPT | Mod: ,,, | Performed by: STUDENT IN AN ORGANIZED HEALTH CARE EDUCATION/TRAINING PROGRAM

## 2023-01-23 PROCEDURE — 99232 SBSQ HOSP IP/OBS MODERATE 35: CPT | Mod: 57,,, | Performed by: PHYSICIAN ASSISTANT

## 2023-01-23 PROCEDURE — 27000207 HC ISOLATION

## 2023-01-23 PROCEDURE — 86920 COMPATIBILITY TEST SPIN: CPT | Performed by: NEUROLOGICAL SURGERY

## 2023-01-23 PROCEDURE — 85027 COMPLETE CBC AUTOMATED: CPT | Performed by: STUDENT IN AN ORGANIZED HEALTH CARE EDUCATION/TRAINING PROGRAM

## 2023-01-23 PROCEDURE — 25000003 PHARM REV CODE 250: Performed by: INTERNAL MEDICINE

## 2023-01-23 PROCEDURE — 99232 PR SUBSEQUENT HOSPITAL CARE,LEVL II: ICD-10-PCS | Mod: 57,,, | Performed by: PHYSICIAN ASSISTANT

## 2023-01-23 PROCEDURE — 85610 PROTHROMBIN TIME: CPT | Performed by: STUDENT IN AN ORGANIZED HEALTH CARE EDUCATION/TRAINING PROGRAM

## 2023-01-23 PROCEDURE — 86920 COMPATIBILITY TEST SPIN: CPT | Performed by: STUDENT IN AN ORGANIZED HEALTH CARE EDUCATION/TRAINING PROGRAM

## 2023-01-23 RX ORDER — HYDROCODONE BITARTRATE AND ACETAMINOPHEN 500; 5 MG/1; MG/1
TABLET ORAL
Status: DISCONTINUED | OUTPATIENT
Start: 2023-01-23 | End: 2023-01-24

## 2023-01-23 RX ORDER — POLYETHYLENE GLYCOL 3350 17 G/17G
17 POWDER, FOR SOLUTION ORAL DAILY PRN
Status: DISCONTINUED | OUTPATIENT
Start: 2023-01-23 | End: 2023-02-28 | Stop reason: HOSPADM

## 2023-01-23 RX ADMIN — AMOXICILLIN 1000 MG: 500 CAPSULE ORAL at 08:01

## 2023-01-23 RX ADMIN — LORAZEPAM 0.5 MG: 0.5 TABLET ORAL at 11:01

## 2023-01-23 RX ADMIN — LACTULOSE 20 G: 20 SOLUTION ORAL at 08:01

## 2023-01-23 RX ADMIN — ACETAMINOPHEN 650 MG: 325 TABLET ORAL at 12:01

## 2023-01-23 RX ADMIN — OXYCODONE HYDROCHLORIDE 10 MG: 10 TABLET ORAL at 04:01

## 2023-01-23 RX ADMIN — METHOCARBAMOL 1000 MG: 500 TABLET ORAL at 09:01

## 2023-01-23 RX ADMIN — BUPROPION HYDROCHLORIDE 300 MG: 300 TABLET, FILM COATED, EXTENDED RELEASE ORAL at 08:01

## 2023-01-23 RX ADMIN — OXYCODONE HYDROCHLORIDE 10 MG: 10 TABLET ORAL at 12:01

## 2023-01-23 RX ADMIN — PANTOPRAZOLE SODIUM 40 MG: 40 TABLET, DELAYED RELEASE ORAL at 08:01

## 2023-01-23 RX ADMIN — OXYCODONE HYDROCHLORIDE 10 MG: 10 TABLET ORAL at 02:01

## 2023-01-23 RX ADMIN — LACTULOSE 20 G: 20 SOLUTION ORAL at 03:01

## 2023-01-23 RX ADMIN — ACETAMINOPHEN 650 MG: 325 TABLET ORAL at 06:01

## 2023-01-23 RX ADMIN — GABAPENTIN 400 MG: 400 CAPSULE ORAL at 03:01

## 2023-01-23 RX ADMIN — GABAPENTIN 400 MG: 400 CAPSULE ORAL at 08:01

## 2023-01-23 RX ADMIN — OXYCODONE HYDROCHLORIDE 10 MG: 10 TABLET ORAL at 08:01

## 2023-01-23 RX ADMIN — POTASSIUM CHLORIDE 20 MEQ: 1500 TABLET, EXTENDED RELEASE ORAL at 08:01

## 2023-01-23 RX ADMIN — POLYETHYLENE GLYCOL 3350 17 G: 17 POWDER, FOR SOLUTION ORAL at 12:01

## 2023-01-23 RX ADMIN — METHOCARBAMOL 1000 MG: 500 TABLET ORAL at 12:01

## 2023-01-23 RX ADMIN — ACETAMINOPHEN 650 MG: 325 TABLET ORAL at 09:01

## 2023-01-23 RX ADMIN — METHOCARBAMOL 1000 MG: 500 TABLET ORAL at 06:01

## 2023-01-23 NOTE — PROGRESS NOTES
[facility] - Neurosurgery ([facility])  [facility] Medicine  Progress Note    Patient Name: Rachel Zazueta  MRN: [id number]  Patient Class: IP- Inpatient   Admission Date: 1/19/2023  Length of Stay: 4 days  Attending Physician: Oliver Méndez MD  Primary Care Provider: Dannielle Merino DO        Subjective:     Principal Problem:Weakness of both lower extremities        HPI:  Ms. Zazueta is a 42 y.o F w/ hx ofIV drug use (methamphetamine), chronic HCV with cirrhosis, spinal fusion (04/2021), epidural abscess with removal of hardware (02/2022), spinal fusion with hardware (T10 - Pelvis / 03/2022), obesity (BMI 39.3) and neurogenic bladder who initially presented to [facility] for evaluation of back pain and left leg weakness.  She reports initially noting the left leg weakness when she was about to go to the bathroom and was about to fall but was assisted by her boyfriend.  She was brought to the ED via EMS.  Urinalysis with UTI concerns and blood cultures at OSH grew ESBL E coli so she was treated with meropenem.  During hospitalization, she reports the weakness that started out in her left leg initially then spread upwards to her left thigh, left hip, then crossed over to the right hip and spread to her right leg.  Denies recent IV drug use. She reports her last incidence of drug use was more than 3 years ago and also denies tobacco, and alcohol abuse.  Reports cannabis use.    She also reports more than 10 years ago she had an episode of a headache that lasted about 4 days and was associated with residual defects of a strabismus in the left eye with associated visual disturbances.  She also reports over the past few years her friends have noticed that she sometimes has word-finding difficulty during conversations.     Reports shortness of breath when sitting up, fever, chills, back pain, lower extremity weakness(initally L>R, but now R>L), diarrhea.  Denies cough, nausea, vomiting, constipation, bladder or  bowel incontinence, dysuria, dark urine, new vision changes.    OSH course notable for concerning urinalysis and blood cultures positive for ESBL E coli treated with meropenem.  She was also admit to the ICU where she was treated with Precedex for significant body aches, irritability, and muscle spasms.  Concerns of a murmur on physical exam so she underwent TTE which did not show any vegetations.  Worsening lower extremity weakness workup with MRI head nonspecific, MRI cervical spine with multilevel spondylosis, X-ray spine with multilevel thoracolumbar fusion and diskectomy, focal kyphosis at T9-10 increased compared to prior.  She was started on prophylaxis amoxicillin due to her history of infected hardware.  MRI spine unable to be completed due to patient's body habitus so she was transferred to [facility] for further imaging and Neurosurgery, Neurology evaluation.      Overview/Hospital Course:  Pt admitted as a transfer from [facility] for worsening LE weakness, concern for spinal infection, and need for MRI which could not be done at OSH. Imaging was completed here. Worsening proximal junctional kyphosis noted at T9-10 noted with concern for discitis at T8-9, T9-10. NSGY evaluated. Plan for OR Tuesday 1/24 for hardware removal, washout, and re-instrumentation/extension of fusion to T4.      Interval History: NAEON. Pt repots pain adequately controlled.    Review of Systems   Constitutional:  Negative for fever.   HENT:  Negative for congestion and rhinorrhea.    Eyes:  Negative for visual disturbance.   Respiratory:  Negative for shortness of breath.    Cardiovascular:  Negative for chest pain.   Gastrointestinal:  Negative for abdominal distention, abdominal pain, nausea and vomiting.   Genitourinary:  Negative for dysuria and hematuria.   Musculoskeletal:  Positive for back pain. Negative for neck pain.   Neurological:  Positive for weakness. Negative for dizziness, seizures, light-headedness, numbness and  headaches.   Objective:     Vital Signs (Most Recent):  Temp: 98.5 °F (36.9 °C) (01/23/23 1105)  Pulse: 93 (01/23/23 1105)  Resp: 16 (01/23/23 1254)  BP: (!) 124/58 (01/23/23 1105)  SpO2: 97 % (01/23/23 1105)   Vital Signs (24h Range):  Temp:  [97.2 °F (36.2 °C)-98.5 °F (36.9 °C)] 98.5 °F (36.9 °C)  Pulse:  [93-95] 93  Resp:  [16-18] 16  SpO2:  [94 %-97 %] 97 %  BP: ()/(58-61) 124/58     Weight: 104 kg (229 lb 4.5 oz)  Body mass index is 35.91 kg/m².    Intake/Output Summary (Last 24 hours) at 1/23/2023 1506  Last data filed at 1/23/2023 0727  Gross per 24 hour   Intake 550 ml   Output 350 ml   Net 200 ml      Physical Exam  Vitals and nursing note reviewed.   Constitutional:       General: She is not in acute distress.     Appearance: She is obese. She is not ill-appearing, toxic-appearing or diaphoretic.   HENT:      Head: Normocephalic.      Mouth/Throat:      Mouth: Mucous membranes are moist.   Eyes:      General: No scleral icterus.        Right eye: No discharge.         Left eye: No discharge.   Cardiovascular:      Rate and Rhythm: Normal rate and regular rhythm.      Heart sounds: Murmur heard.   Pulmonary:      Effort: Pulmonary effort is normal. No respiratory distress.      Breath sounds: Normal breath sounds.   Abdominal:      General: Bowel sounds are normal.      Palpations: Abdomen is soft.      Tenderness: There is no abdominal tenderness.   Musculoskeletal:      Cervical back: No rigidity.      Right lower leg: No edema (Mild).      Left lower leg: No edema (Mild).   Skin:     General: Skin is warm and dry.      Coloration: Skin is not jaundiced.   Neurological:      Mental Status: She is alert and oriented to person, place, and time. Mental status is at baseline.      Motor: Weakness (Bilateral LE) and tremor (Bilateral UE) present.      Deep Tendon Reflexes: Reflexes abnormal. Babinski sign present on the right side. Babinski sign present on the left side.   Psychiatric:         Mood and  Affect: Mood normal.         Behavior: Behavior normal.       Significant Labs: All pertinent labs within the past 24 hours have been reviewed.  CBC:   Recent Labs   Lab 01/22/23  0427 01/23/23  0328   WBC 2.35* 1.82*   HGB 8.8* 8.9*   HCT 29.4* 30.7*   PLT 83* 88*     CMP:   Recent Labs   Lab 01/22/23  0427      K 4.0      CO2 24   GLU 66*   BUN 14   CREATININE 0.5   CALCIUM 8.6*   PROT 7.2   ALBUMIN 2.1*   BILITOT 1.8*   ALKPHOS 117   AST 39   ALT 20   ANIONGAP 6*       Significant Imaging: I have reviewed all pertinent imaging results/findings within the past 24 hours.      Assessment/Plan:      * Weakness of both lower extremities  42 y.o F w/ hx of IV drug use (methamphetamine), chronic HCV with cirrhosis, spinal fusion (04/2021), epidural abscess with removal of hardware (02/2022), spinal fusion with hardware (T10 - Pelvis / 03/2022), obesity (BMI 39.3) and neurogenic bladder who initially presented to [facility] for evaluation of back pain and left leg weakness.  She reports initially noting the left leg weakness when she was about to go to the bathroom and was about to fall but was assisted by her boyfriend.   During hospitalization, she reports the weakness that started out in her left leg initially then spread upwards to her left thigh, left hip, then crossed over to the right hip and spread to her right leg.  Denies recent IV drug use. She reports her last incidence of drug use was more than 3 years ago and also denies tobacco, and alcohol abuse.  Reports cannabis use. back pain, lower extremity weakness(initally L>R, but now R>L), diarrhea.  Denies constipation, bladder or bowel incontinence, dysuria, dark urine. On further chart review, OSH records show that pt relapsed to IVDU prior to admission.    OSH evaluation for weakness  MRI brain: Nonspecific gliotic white matter signal change within bilateral cerebral hemispheres similar in extent and distribution greater than expected for  patient age.  This may be related to chronic small vessel ischemia.  Numerous chronic systemic illnesses can result in this appearance. Demyelinating disease is also in the differential.  MRI cervical spine: C3-C7 multilevel spondylosis  MRI spine unable to be performed due to body habitus    Transfer to [facility] for neurosurgery evaluation, neurology evaluation and MRI    Differential diagnoses include, but not limited to:  - Multiple Sclerosis(history of past neurologic deficits, MRI brain with demyelinating disease concerns)  - Gu[place] Syndrome(recent UTI infection, ascending pattern of weakness)  - Inflammatory myopathy(physical exam with proximal LE muscle weakness)  - Infected hardware vs. Spinal abscess(hx of infected hardware, IVDU is a risk factor)  - Central cord syndrome(less likely as weakness in LE>UE which would be atypical)   - Most likely etiology is infected hardware and discitis + kyphosis of thoracolumbar spine. Finished course of Meropenem at OSH for ESBL bacteremia, with repeat BC NGTD.    PLAN  - NSGY following. To OR for hardware removal, washout, and re-instrumentation/extension of fusion to T4 on 1/24/23  - Platelet transfusion pre-operatively with goal >100k   - ID following  - Repeat UA dirty, urine culture polymicrobial w/o predominant organism  - Repeat BCx negative thusfar  - Patient stable, not septic. Continue prophylaxis amoxicillin due to history of infected hardware.    - ID following, appreciate recs    Kyphosis of thoracolumbar region  See weakness    Thoracic discitis  See weakness    Anxiety and depression  Recently evaluated by psychiatry at OSH     PLAN  - Continue Lorazepam PRN anxiety  - Depression- Bupropion 300 mg XR qd  - Anxiety-Gabapentin off-label 400 mg TID    Substance use disorder  Denies recent IV drug use. She reports her last incidence of drug use was more than 3 years ago and also denies tobacco, and alcohol abuse. Reports cannabis use.   OSH records however  "note that she relapsed prior to admission. Was being followed by Psych.    Chronic hepatitis C with cirrhosis  See "Cirrhosis of liver with ascites" A&P    Cirrhosis of liver with ascites  Chronic. Hx of Hepatitis C. Transaminitis present as far back as 2015 on labs.  RUQ U/S 02/2022: Hepatic cirrhosis and portal hypertension with satisfactory Doppler evaluation of the liver.-  Follows with GI outpatient with most recent visit in 05/2022 where she refused transplant consideration.  Patient reported shortness of breath exacerbated by sitting up, concerning for platypnea.   Repeat with possible portopulmonary hypertension, extracardiac shunting on bubble study.     MELD-Na score: 11 at 1/21/2023  3:19 AM  MELD score: 11 at 1/21/2023  3:19 AM  Calculated from:  Serum Creatinine: 0.5 mg/dL (Using min of 1 mg/dL) at 1/21/2023  3:19 AM  Serum Sodium: 135 mmol/L at 1/21/2023  3:19 AM  Total Bilirubin: 1.6 mg/dL at 1/21/2023  3:19 AM  INR(ratio): 1.3 at 1/19/2023 10:53 PM  Age: 42 years    PLAN  - Trend transaminitis on CMP  - Continue lactulose  - Patient overdue for GI follow-up for hep C treatment.  on outpatient followup at discharge      VTE Risk Mitigation (From admission, onward)         Ordered     enoxaparin injection 40 mg  Daily         01/21/23 0733     IP VTE HIGH RISK PATIENT  Once         01/19/23 2153     Place sequential compression device  Until discontinued         01/19/23 2153     Reason for No Pharmacological VTE Prophylaxis  Once        Question:  Reasons:  Answer:  Physician Provided (leave comment)  Comment:  Potential NSGY procedure    01/19/23 2153                Discharge Planning   SHIRA: 1/26/2023     Code Status: Full Code   Is the patient medically ready for discharge?: No    Reason for patient still in hospital (select all that apply): Treatment  Discharge Plan A: Home with family                  Gama Nunez MD  Department of [facility] Medicine   [facility] - Neurosurgery " ([facility])

## 2023-01-23 NOTE — ANESTHESIA PREPROCEDURE EVALUATION
[facility]  Anesthesia Pre-Operative Evaluation         Patient Name: Rachel Zazueta  YOB: 1980  MRN: [id number]    SUBJECTIVE:     Pre-operative evaluation for Procedure(s) (LRB):  **AIRO** Revision fusion, spine T4-Pelvis, T9 corpectomy, AIRO, Carmen, Globus, neuromonitoring (N/A)     01/23/2023    Rachel Zazueta is a 42 y.o. female w/ a significant PMHx IVDU, Hep C Cirrhosis, Anxiety/Depression, Lumbar and thoracic discitis, Pulmonary HTN. s/p spinal fusion (04/2021), lumbar epidural abscess (+ GBS) with removal of hardware (02/2022), and T10-pelvis spinal fusion with hardware (03/2022). Now with worsening T9-T10 compression. CT with spondylodiscitis T8-9, T9-10.    Patient now presents for the above procedure(s).    TTE 1/20/23:   Study is negative for intercardiac shunt that is right to left ( see text).   The left ventricle is normal in size with concentric remodeling and normal systolic function.   The estimated ejection fraction is 58%.   There are segmental left ventricular wall motion abnormalities.   Normal left ventricular diastolic function.   Normal right ventricular size with normal right ventricular systolic function.   Mild right atrial enlargement.   Normal central venous pressure (3 mmHg).   The estimated PA systolic pressure is 26 mmHg.         LDA: None documented.       Midline Catheter Insertion/Assessment  - Single Lumen 01/12/23 2138 Right basilic vein (medial side of arm) 18g x 10cm (Active)   $ Midline Charges (Upon insertion) Bedside Insertion Performed Pt > 3 Years Old;Midline Catheter (Supply);Ultrasound Guide for Vascular Access 01/12/23 2142   Site Assessment Clean;Dry;Intact 01/22/23 1955   IV Device Securement catheter securement device 01/22/23 1800   Line Status Flushed;Saline locked 01/22/23 1955   Dressing Type Biopatch in place 01/22/23 1800   Dressing Status Clean;Dry;Intact 01/22/23 1955   Dressing Intervention Sterile dressing  change 01/22/23 0428   Dressing Change Due 01/29/23 01/22/23 0428   Number of days: 10       Prev airway:   Intubation     Date/Time: 11/15/2022 12:30 PM  Performed by: Isiah Velasquez CRNA  Authorized by: Isiah Velasquez CRNA      Intubation:     Induction:  Intravenous    Intubated:  Postinduction    Mask Ventilation:  N/a    Attempts:  1    Attempted By:  CRNA    Method of Intubation:  Direct    Blade:  Moore 2    Laryngeal View Grade: Grade I - full view of cords      Difficult Airway Encountered?: No      Complications:  None    Airway Device:  Oral endotracheal tube    Airway Device Size:  7.0    Style/Cuff Inflation:  Cuffed    Inflation Amount (mL):  5    Tube secured:  19    Secured at:  The lips    Placement Verified By:  Capnometry    Complicating Factors:  None    Findings Post-Intubation:  BS equal bilateral and atraumatic/condition of teeth unchanged       Drips: None documented.      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    Spondylodiscitis    Spinal stenosis at L4-L5 level    Thrombocytopenia    Tobacco use disorder    Amphetamine use disorder, severe    Cannabis use disorder, moderate, dependence    Chronic midline low back pain with sciatica    Mild episode of recurrent major depressive disorder    Abnormal LFTs    Coagulopathy    Discitis of lumbar region    Edema    S/P spinal fusion    Pyogenic arthritis of left shoulder region    Murmur, cardiac    Severe sepsis    Pulmonary hypertension    Debility    Anxiety and depression    Weakness of both lower extremities    Thoracic discitis    Kyphosis of thoracolumbar region       Review of patient's allergies indicates:   Allergen Reactions    Bee venom protein (honey bee) Anaphylaxis     Patient reports she is allergic to bee stings.    Naproxen Anaphylaxis      Throat closing    12-23- Patient reports taking Ibuprofen 200 mg at home without problems. Verified X3. [place]    Wasp sting [allergen ext-venom-honey bee] Anaphylaxis    Adhesive Blisters    Iodine and iodide containing products Hives     Allergic to iodine in seafood only    Shellfish containing products     Nuts [tree nut] Rash       Current Inpatient Medications:   acetaminophen  650 mg Oral Q8H    amoxicillin  1,000 mg Oral Q12H    buPROPion  300 mg Oral Daily    enoxaparin  40 mg Subcutaneous Daily    gabapentin  400 mg Oral TID    lactulose  20 g Oral TID    methocarbamoL  1,000 mg Oral Q8H    pantoprazole  40 mg Oral Daily    potassium chloride  20 mEq Oral Daily       No current facility-administered medications on file prior to encounter.     Current Outpatient Medications on File Prior to Encounter   Medication Sig Dispense Refill    albuterol (ACCUNEB) 1.25 mg/3 mL Nebu Take 3 mLs (1.25 mg total) by nebulization every 6 (six) hours as needed (shortness of breath). Rescue 75 mL 0    buPROPion (WELLBUTRIN) 100 MG tablet Take 1 tablet (100 mg total) by mouth 2 (two) times daily. 60 tablet 2    folic acid (FOLVITE) 1 MG tablet Take 1 tablet (1 mg total) by mouth once daily. (Patient not taking: Reported on 11/25/2022) 42 tablet 0    gabapentin (NEURONTIN) 300 MG capsule Take 1 capsule (300 mg total) by mouth 3 (three) times daily. 90 capsule 11    lactulose (CHRONULAC) 20 gram/30 mL Soln Take 30 mLs (20 g total) by mouth 3 (three) times daily. 2700 mL 2    pantoprazole (PROTONIX) 40 MG tablet Take 1 tablet (40 mg total) by mouth once daily. 30 tablet 1    [DISCONTINUED] diclofenac sodium (VOLTAREN) 1 % Gel Apply 2 g topically 4 (four) times daily. 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9    CHOLECYSTECTOMY      KIDNEY SURGERY      LUMBAR FUSION Bilateral 3/2/2022    Procedure: FUSION, SPINE, LUMBAR;  Surgeon: Guille Templeton MD;  Location: [address];  Service: Neurosurgery;  Laterality: Bilateral;  AIRO  T10--Pelvis    REMOVAL OF HARDWARE FROM SPINE N/A 2/21/2022    Procedure: REMOVAL, HARDWARE, SPINE;  Surgeon: Guille Templeton MD;  Location: [facility] OR [address] [place] [address];  Service: Neurosurgery;  Laterality: N/A;  [place]    SPINE SURGERY         Social History     Socioeconomic History    Marital status:    Tobacco Use    Smoking status: Some Days     Packs/day: 0.50     Years: 23.00     Pack years: 11.50     Types: Cigarettes    Smokeless tobacco: Never    Tobacco comments:     patient states she knows she nees to quit.   Substance and Sexual Activity    Alcohol use: Not Currently     Comment: quit 2014    Drug use: Yes     Frequency: 4.0 times per week     Types: Marijuana     Comment: Patient denies needle use, only inhalation of methampehtamines or orally.  Last known drug use was prior to admission to hospital stay for surgery    Sexual activity: Yes     Partners: Male     Birth control/protection: None     Social Determinants of Health     Financial Resource Strain: Medium Risk    Difficulty of Paying Living Expenses: Somewhat hard   Food Insecurity: No Food Insecurity    Worried About Running Out of Food in the Last Year: Never true    Ran Out of Food in the Last Year: Never true   Transportation Needs: Unmet Transportation Needs    Lack of Transportation (Medical): Yes    Lack of Transportation (Non-Medical): Yes   Physical Activity: Inactive    Days of Exercise per Week: 0 days    Minutes of Exercise per Session: 0 min   Stress: Stress Concern Present    Feeling of Stress : Very much   Social Connections: Unknown    Frequency of Communication with Friends and Family: More  than three times a week    Frequency of Social Gatherings with Friends and Family: Never    Active Member of Clubs or Organizations: No    Attends Club or Organization Meetings: Never    Marital Status:    Housing Stability: High Risk    Unable to Pay for Housing in the Last Year: No    Number of Places Lived in the Last Year: 1    Unstable Housing in the Last Year: Yes       OBJECTIVE:     Vital Signs Range (Last 24H):  Temp:  [36.2 °C (97.2 °F)-36.9 °C (98.5 °F)]   Pulse:  [93-95]   Resp:  [16-18]   BP: ()/(58-61)   SpO2:  [94 %-97 %]       Significant Labs:  Lab Results   Component Value Date    WBC 1.82 (LL) 01/23/2023    HGB 8.9 (L) 01/23/2023    HCT 30.7 (L) 01/23/2023    PLT 88 (L) 01/23/2023    CHOL 68 (L) 02/07/2022    TRIG 50 02/07/2022    HDL 27 (L) 02/07/2022    ALT 20 01/22/2023    AST 39 01/22/2023     01/22/2023    K 4.0 01/22/2023     01/22/2023    CREATININE 0.5 01/22/2023    BUN 14 01/22/2023    CO2 24 01/22/2023    TSH 1.255 04/16/2021    INR 1.3 (H) 01/19/2023    HGBA1C 4.6 04/16/2021       Diagnostic Studies: No relevant studies.    EKG:   Results for orders placed or performed during the hospital encounter of 12/23/22   EKG 12-lead    Collection Time: 12/23/22  3:00 AM    Narrative    Test Reason :     Vent. Rate : 124 BPM     Atrial Rate : 124 BPM     P-R Int : 112 ms          QRS Dur : 098 ms      QT Int : 322 ms       P-R-T Axes : 062 044 -22 degrees     QTc Int : 462 ms    Sinus tachycardia  Possible Inferior infarct (cited on or before 23-DEC-2022)  T wave abnormality, consider lateral ischemia  Abnormal ECG  When compared with ECG of 23-DEC-2022 02:46,  No significant change was found  Confirmed by BUTCH POPE, HOMEYAR ([id number]) on 12/23/2022 10:38:11 PM    Referred By: [email or web address]   SELF           Confirmed By:MICHELL RAE MD       2D ECHO:  TTE:  Results for orders placed or performed during the hospital encounter of 01/19/23   Echo Saline Bubble? Yes    Result Value Ref Range    BSA 2.2 m2    TDI SEPTAL 0.11 m/s    LA WIDTH 4.68 cm    TDI LATERAL 0.12 m/s    LVIDd 4.35 3.5 - 6.0 cm    IVS 0.91 0.6 - 1.1 cm    Posterior Wall 0.99 0.6 - 1.1 cm    LVIDs 3.35 2.1 - 4.0 cm    FS 23 28 - 44 %    LA volume 72.76 cm3    Sinus 3.09 cm    STJ 2.95 cm    Ascending aorta 3.11 cm    LV mass 135.24 g    LA size 3.82 cm    RVDD 3.30 cm    TAPSE 2.27 cm    Left Ventricle Relative Wall Thickness 0.46 cm    AV mean gradient 7 mmHg    AV valve area 2.85 cm2    AV Velocity Ratio 0.78     AV index (prosthetic) 0.72     Mean e' 0.12 m/s    LVOT diameter 2.24 cm    LVOT area 3.9 cm2    LVOT peak wilmer 1.23 m/s    LVOT peak VTI 24.02 cm    Ao peak wilmer 1.58 m/s    Ao VTI 33.15 cm    LVOT stroke volume 94.61 cm3    AV peak gradient 10 mmHg    TR Max Wilmer 2.39 m/s    LV Systolic Volume 45.71 mL    LV Systolic Volume Index 21.5 mL/m2    LV Diastolic Volume 85.48 mL    LV Diastolic Volume Index 40.13 mL/m2    LA Volume Index 34.2 mL/m2    LV Mass Index 63 g/m2    RA Major Axis 5.16 cm    Left Atrium Minor Axis 4.34 cm    Left Atrium Major Axis 5.34 cm    Triscuspid Valve Regurgitation Peak Gradient 23 mmHg    RA Width 3.77 cm    Right Atrial Pressure (from IVC) 3 mmHg    EF 58 %    TV rest pulmonary artery pressure 26 mmHg    Narrative    · Study is negative for intercardiac shunt that is right to left ( see   text).  · The left ventricle is normal in size with concentric remodeling and   normal systolic function.  · The estimated ejection fraction is 58%.  · There are segmental left ventricular wall motion abnormalities.  · Normal left ventricular diastolic function.  · Normal right ventricular size with normal right ventricular systolic   function.  · Mild right atrial enlargement.  · Normal central venous pressure (3 mmHg).  · The estimated PA systolic pressure is 26 mmHg.          ROSA:  No results found for this or any previous visit.    ASSESSMENT/PLAN:                                                                                                                   01/23/2023  Rachel Zazueta is a 42 y.o., female.      Pre-op Assessment    I have reviewed the Patient Summary Reports.     I have reviewed the Nursing Notes. I have reviewed the NPO Status.   I have reviewed the Medications.     Review of Systems  Anesthesia Hx:  No problems with previous Anesthesia  History of prior surgery of interest to airway management or planning: Previous anesthesia: General 3/2022 lumbar fusion (easy BMV; DL Moore#2) with general anesthesia.  Denies Family Hx of Anesthesia complications.   Denies Personal Hx of Anesthesia complications.   Social:  Smoker IVDU   Hematology/Oncology:     Oncology Normal   Hematology Comments: pancytopenia   EENT/Dental:EENT/Dental Normal   Cardiovascular:   Exercise tolerance: good Denies Hypertension.  Denies CAD.    Denies CABG/stent.   Denies CHF. no hyperlipidemia ECG has been reviewed. EKG (2-2022)  Sinus tachycardia   Left posterior fascicular block , due to lead misplacemet   Abnormal ECG   When compared with ECG of 15-FEB-2022 14:31,   Inverted T waves have replaced nonspecific T wave abnormality in Inferior   leads    Pulmonary:   Denies COPD.  Denies Asthma.  Denies Sleep Apnea. Pulm HTN   Renal/:   Denies Chronic Renal Disease. renal calculi     Hepatic/GI:   Denies GERD. Liver Disease, Hepatitis, C    Musculoskeletal:   Hx [id number] fusion    Infection AC joint Spine Disorders: lumbar and thoracic    Neurological:   Denies CVA.  Denies Headaches. Denies Seizures.   Chronic Pain Syndrome   Endocrine:   Denies Diabetes.  Morbid Obesity / BMI > 40Denies Obesity / BMI > 30  Psych:   Denies Psychiatric History.          Physical Exam  General: Well nourished and Cooperative    Airway:  Mallampati: II   Mouth Opening: Normal  TM Distance: Normal  Tongue: Normal  Neck ROM: Normal ROM    Dental:  Intact, Periodontal disease        Anesthesia Plan  Type of Anesthesia, risks  & benefits discussed:    Anesthesia Type: Gen ETT  Intra-op Monitoring Plan: Standard ASA Monitors and Art Line  Post Op Pain Control Plan: multimodal analgesia and IV/PO Opioids PRN  Induction:  IV  Airway Plan: Direct and Video, Post-Induction  Informed Consent: Informed consent signed with the Patient and all parties understand the risks and agree with anesthesia plan.  All questions answered.   ASA Score: 3  Day of Surgery Review of History & Physical: H&P Update referred to the surgeon/provider.    Ready For Surgery From Anesthesia Perspective.     .

## 2023-01-23 NOTE — MEDICAL/APP STUDENT
[facility] Medicine Student   Progress Note  [facility]    Admit Date: 1/19/2023  Hospital Day: 4  01/23/2023  11:35 AM    SUBJECTIVE:   Ms. Rachel Zazueta is a 42 y.o. female with a relevant medical history of  IV drug use, chronic hep C with cirrhosis, spinal fusion in April 2021, complicated by epidural abscess with removal of hardware February 2022 and against spinal fusion in March 2022 of T10 through the pelvis as well as neurogenic bladder who is being followed up for Weakness of both lower extremities.    Interval history:  Overnight was largely uneventful, pain and anxiety managed well with PRN med. Consult from ID recommended continuing Amoxicillin for now until surgery so long as stable and afebrile. Booked for surgery tomorrow.    Review of Systems   Constitutional:  Negative for fever.   Respiratory:  Negative for cough.    Gastrointestinal:  Negative for abdominal pain.   Musculoskeletal:  Positive for back pain.   Neurological: Negative.    Endo/Heme/Allergies: Negative.    Psychiatric/Behavioral:  The patient is nervous/anxious.      Please refer to the H&P for past medical, family, and social history.    OBJECTIVE:     Vital Signs Recent:  Temp: 98.5 °F (36.9 °C) (01/23/23 1105)  Pulse: 93 (01/23/23 1105)  Resp: 18 (01/23/23 1105)  BP: (!) 124/58 (01/23/23 1105)  SpO2: 97 % (01/23/23 1105)    Oxygen Documentation:                Device (Oxygen Therapy): room air         Vital Signs Range (Last 24H):  Temp:  [97.2 °F (36.2 °C)-98.5 °F (36.9 °C)]   Pulse:  [93-95]   Resp:  [16-18]   BP: ()/(58-61)   SpO2:  [94 %-97 %]        I & O (Last 24H):  Intake/Output Summary (Last 24 hours) at 1/23/2023 1135  Last data filed at 1/23/2023 0727  Gross per 24 hour   Intake 950 ml   Output 750 ml   Net 200 ml        Physical Exam  Constitutional:       Appearance: Normal appearance.   Pulmonary:      Comments: Not able to auscultate lungs due to pain with movement    Neurological:      Mental Status:  She is alert.   Psychiatric:         Mood and Affect: Mood normal.       Labs:   Recent Labs   Lab 01/21/23 0319 01/22/23 0427   * 137   K 3.7 4.0    107   CO2 24 24   BUN 16 14   CREATININE 0.5 0.5   GLU 74 66*   CALCIUM 8.3* 8.6*   MG 1.6 1.6   PHOS 4.0 3.8     Recent Labs   Lab 01/19/23  2253 01/20/23  0543 01/21/23 0319 01/22/23 0427   ALKPHOS  --  128 121 117   ALT  --  23 22 20   AST  --  43* 39 39   ALBUMIN  --  2.2* 2.0* 2.1*   PROT  --  7.4 6.9 7.2   BILITOT  --  1.6* 1.6* 1.8*   INR 1.3*  --   --   --      Recent Labs   Lab 01/21/23 0319 01/22/23 0427 01/23/23 0328   WBC 2.08* 2.35* 1.82*   HGB 8.9* 8.8* 8.9*   HCT 29.5* 29.4* 30.7*   PLT 83* 83* 88*       Diagnostic Results:  NGTD for blood cx (collected 1/20/2023)     Scheduled Meds:   acetaminophen  650 mg Oral Q8H    amoxicillin  1,000 mg Oral Q12H    buPROPion  300 mg Oral Daily    enoxaparin  40 mg Subcutaneous Daily    gabapentin  400 mg Oral TID    lactulose  20 g Oral TID    methocarbamoL  1,000 mg Oral Q8H    pantoprazole  40 mg Oral Daily    potassium chloride  20 mEq Oral Daily     Continuous Infusions:  PRN Meds:dextrose 10%, dextrose 10%, glucagon (human recombinant), glucose, glucose, LORazepam, magnesium hydroxide 400 mg/5 ml, melatonin, naloxone, oxyCODONE, polyethylene glycol, promethazine, sodium chloride 0.9%    ASSESSMENT/PLAN:   Ms. Rachel Zazueta is a 42 y.o. female with a relevant medical history of *** who is being followed up for Weakness of both lower extremities.    Active Hospital Problems    Diagnosis  POA    *Weakness of both lower extremities [R29.898]  Yes    Thoracic discitis [M46.44]  Yes    Kyphosis of thoracolumbar region [M40.205]  Yes    Anxiety and depression [F41.9, F32.A]  Yes     Chronic    Substance use disorder [F19.90]  Yes     Chronic    Chronic hepatitis C with cirrhosis [B18.2, K74.60]  Yes     Chronic    Cirrhosis of liver with ascites [K74.60, R18.8]  Yes     Chronic      Resolved  Hospital Problems   No resolved problems to display.       *** Always prioritize your diagnosis, make an assessment of each diagnosis (stable/unstable or controlled/uncontrolled), and what you want to do and why. In a follow-up note list problems you are monitoring daily to include HTN or DM, but DO NOT include diseases that we are not monitoring daily such as osteoporosis or obesity.  1.Weakness of both lower Extremities  ASSESSMENT:    PLAN:  -what treatments or tests you are doing today in outline  -what treatments or tests you are doing today in outline    2. Secondary Diagnosis  ASSESSMENT: Narrative of your Assessment.  Consider adding clinical reasoning to support your Assessment and Hospital Course if needed.  PLAN:  -what treatments or tests you are doing today in outline  -what treatments or tests you are doing today in outline    3. Et cetera.  Last Problem is usually DVT Prophylaxis.    Discharge planning:   *** Most teams like you to mention what criteria we need for the patient to be discharged (e.g. afebrile for 24 hours) and the Disposition (discharge plans such as home or rehab). Code status may be mentioned here as well.

## 2023-01-23 NOTE — PLAN OF CARE
Cardiovascular Risk Assessment:  Active cardiac issues:  Active decompensated heart failure? No   Unstable angina?  No   Significant uncontrolled arrhythmias? No   Severe valvular heart disease-Aortic or Mitral Stenosis? No   Recent MI or coronary revascularization < 30 days? No     Revised cardiac risk index:  History of ischemic heart disease? No   History of cerebrovascular disease? No   History of congestive heart failure? No   Type 2 diabetes requiring insulin? No   Serum Creatinine > 2? No   High risk surgery (intraperitoneal, intrathoracic, suprainguinal, or vascular)? No   RCRI  0   Risk of major cardiac event (death, myocardial infarction, or cardiac arrest) or elevation in troponin level 30 days after noncardiac surgery:   0 predictors = 3.9%, 1 predictor = 6.0%, 2 predictors = 10.1%, ?3 predictors = 15% (Raul 2017)      Summary  Non-emergent surgery.  No active cardiac problems (unstable angina, decompensated heart failure, uncontrolled arrhythmias or severe valvular disease).  Functional Status: <4 METs however this is 2/2 to spinal damage not CV health  < 4* METs - Unable to walk > 2 blocks on level ground without stopping due to symptoms POOR   > 4* METs - Climbing > 1 flight of stairs without stopping, or  - Walking up hill > 1-2 blocks MODERATE to EXCELLENT     RCRI calculator class and risk percentage: 1: 3.9%    Recommendation:  1. Proceed to Surgery.  2. Risks and benefits discussed with patient, who has full understanding.

## 2023-01-23 NOTE — ASSESSMENT & PLAN NOTE
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 Transferred to [facility] to  and neurosurgery was consulted    Plan:  Admitted to  floor status  -- MRI C/T/L spine 1/20 with C7 SP enhancement, focal kyphosis at T8-10 with severe anterior height loss at T9, enhancing C6 inferior endplate Schmorl's node  -- CT T/L spine thin cut 1/20 with haloing of pelvic and T10 screws, spondylodiscitis T8-9, T9-10  -- CT C spine with concern for discitis at C6-7  -- flex ext XR done, limited view of C6-7 in swimmers view 2/2 shoulders  -- Continue amoxicillin at this time, f/u further ID recs. BCx NGTD.  -- 1/20 ESR 70, CRP 9.4. elevated from prior  -- Booked for Tuesday for T4-Pelvis revision extension, T9 corpectomy. NPO at midnight. Primary team to document risk stratification. Plt transfusion today. Plt goal > 100k.   -- TLSO at bedside  -- multimodal pain control per primary medicine team  NSGY will continue to follow. Please contact team with any further questions.

## 2023-01-23 NOTE — PLAN OF CARE
Problem: Adult Inpatient Plan of Care  Goal: Plan of Care Review  Outcome: Ongoing, Progressing  Goal: Patient-Specific Goal (Individualized)  Outcome: Ongoing, Progressing  Goal: Absence of Hospital-Acquired Illness or Injury  Outcome: Ongoing, Progressing  Goal: Optimal Comfort and Wellbeing  Outcome: Ongoing, Progressing  Goal: Readiness for Transition of Care  Outcome: Ongoing, Progressing     Problem: Adjustment to Illness (Sepsis/Septic Shock)  Goal: Optimal Coping  Outcome: Ongoing, Progressing     Problem: Bleeding (Sepsis/Septic Shock)  Goal: Absence of Bleeding  Outcome: Ongoing, Progressing     Problem: Glycemic Control Impaired (Sepsis/Septic Shock)  Goal: Blood Glucose Level Within Desired Range  Outcome: Ongoing, Progressing     Problem: Infection Progression (Sepsis/Septic Shock)  Goal: Absence of Infection Signs and Symptoms  Outcome: Ongoing, Progressing     Problem: Nutrition Impaired (Sepsis/Septic Shock)  Goal: Optimal Nutrition Intake  Outcome: Ongoing, Progressing     Problem: Infection  Goal: Absence of Infection Signs and Symptoms  Outcome: Ongoing, Progressing     Problem: Skin Injury Risk Increased  Goal: Skin Health and Integrity  Outcome: Ongoing, Progressing     Problem: Fall Injury Risk  Goal: Absence of Fall and Fall-Related Injury  Outcome: Ongoing, Progressing     Pain and anxiety management with prn medications with good effect. Pt calm, cooperative. No family at bedside.

## 2023-01-23 NOTE — PLAN OF CARE
Problem: Adjustment to Illness (Sepsis/Septic Shock)  Goal: Optimal Coping  Outcome: Ongoing, Progressing     Problem: Adult Inpatient Plan of Care  Goal: Absence of Hospital-Acquired Illness or Injury  Outcome: Ongoing, Progressing     POC reviewed with the patient  verbalized understanding. Prn around the clock, Npo MN  for procedure All comments and concerns addressed. Bed locked in lowest position with bed alarm set, call light within reach. Safety precautions maintained. VSS, see flow sheets Will continue to monitor for changes to POC and clinical condition.

## 2023-01-23 NOTE — PROGRESS NOTES
[facility] - Neurosurgery ([facility])  Neurosurgery  Progress Note    Subjective:     History of Present Illness: Ms. Zazueta is a 42 y.o F w/ hx ofIV drug use (methamphetamine), chronic HCV with cirrhosis, spinal fusion (04/2021), epidural abscess with removal of hardware (02/2022), spinal fusion with hardware (T10 - Pelvis / 03/2022), obesity (BMI 39.3) and neurogenic bladder and recent shoulder surgery who initially presented to [facility] for evaluation of back pain and left leg weakness.  She reports initially noting the left leg weakness when she was about to go to the bathroom and was about to fall but was assisted by her boyfriend.  She was brought to the ED via EMS.  Urinalysis with UTI concerns and blood cultures at OSH grew ESBL E coli, and shoulder effusion concern for infection so she was treated with meropenem.  During hospitalization, she reports the weakness that started out in her left leg initially then spread upwards to her left thigh, left hip, then crossed over to the right hip and spread to her right leg.  Denies recent IV drug use. She reports her last incidence of drug use was more than 3 years ago and also denies tobacco, and alcohol abuse.  Reports cannabis use.     OSH course notable for concerning urinalysis and blood cultures positive for ESBL E coli treated with meropenem.  She was also admit to the ICU where she was treated with Precedex for significant body aches, irritability, and muscle spasms.  Concerns of a murmur on physical exam so she underwent TTE which did not show any vegetations.  Worsening lower extremity weakness workup with MRI head nonspecific, MRI cervical spine with multilevel spondylosis, X-ray spine with multilevel thoracolumbar fusion and diskectomy, focal kyphosis at T9-10 increased compared to prior.  She was started on prophylaxis amoxicillin due to her history of infected hardware.  MRI spine unable to be completed due to patient's body habitus so she was  transferred to [facility] for further imaging and Neurosurgery, Neurology evaluation.      Post-Op Info:  Procedure(s) (LRB):  **AIRO** Revision fusion, spine T4-Pelvis, T9 corpectomy, AIRO, Carmen, Globus, neuromonitoring (N/A)         Interval History: NAEON. AFVSS. Plan for OR tomorrow. NPO at midnight. Plt transfusion today for goal > 100k. Exam stable.     Medications:  Continuous Infusions:  Scheduled Meds:   acetaminophen  650 mg Oral Q8H    amoxicillin  1,000 mg Oral Q12H    buPROPion  300 mg Oral Daily    gabapentin  400 mg Oral TID    lactulose  20 g Oral TID    methocarbamoL  1,000 mg Oral Q8H    pantoprazole  40 mg Oral Daily    potassium chloride  20 mEq Oral Daily     PRN Meds:sodium chloride, dextrose 10%, dextrose 10%, glucagon (human recombinant), glucose, glucose, LORazepam, magnesium hydroxide 400 mg/5 ml, melatonin, naloxone, oxyCODONE, polyethylene glycol, promethazine, sodium chloride 0.9%     Review of Systems  Objective:     Weight: 104 kg (229 lb 4.5 oz)  Body mass index is 35.91 kg/m².  Vital Signs (Most Recent):  Temp: 98.5 °F (36.9 °C) (01/23/23 1105)  Pulse: 93 (01/23/23 1105)  Resp: 16 (01/23/23 1254)  BP: (!) 124/58 (01/23/23 1105)  SpO2: 97 % (01/23/23 1105) Vital Signs (24h Range):  Temp:  [97.2 °F (36.2 °C)-98.5 °F (36.9 °C)] 98.5 °F (36.9 °C)  Pulse:  [93-95] 93  Resp:  [16-18] 16  SpO2:  [94 %-97 %] 97 %  BP: ()/(58-61) 124/58     Date 01/23/23 0700 - 01/24/23 0659   Shift 2637-4821 2832-9976 0621-1919 24 Hour Total   INTAKE   P.O. 450   450   Shift Total(mL/kg) 450(4.3)   450(4.3)   OUTPUT   Urine(mL/kg/hr) 350(0.4)   350   Shift Total(mL/kg) 350(3.4)   350(3.4)   Weight (kg) 104 104 104 104                            Neurosurgery Physical Exam  General: well developed, well nourished, no distress.   Head: normocephalic, atraumatic  Neurologic: Alert and oriented. Thought content appropriate.  GCS: Motor: 6/Verbal: 5/Eyes: 4 GCS Total: 15  Mental Status: Awake, Alert,  Oriented x 4  Language: No aphasia  Speech: No dysarthria  Cranial nerves: face symmetric, tongue midline, CN II-XII grossly intact.   Eyes: pupils equal, round, reactive to light with accommodation, EOMI, nystagmus.   Pulmonary: normal respirations, no signs of respiratory distress  Abdomen: soft, non-distended, not tender to palpation  Skin: Skin is warm, dry and intact.  Sensory: intact to light touch throughout     Motor Strength:Moves all extremities spontaneously with good tone.  Full strength upper and extremities. No abnormal movements seen.      Strength   Deltoids Triceps Biceps Wrist Extension Wrist Flexion Hand    Upper: R 5/5 5/5 5/5 5/5 5/5 5/5     L 5/5 5/5 5/5 5/5 5/5 5/5       Iliopsoas Quadriceps Knee  Flexion Tibialis  anterior Gastro- cnemius EHL   Lower: R 2/5 1/5 2/5 1/5 1/5 1/5     L 2/5 2/5 1/5 1/5 1/5 1/5      Reflexes:   DTR: 2+ symmetrically throughout.  Parker's: Negative.  Babinski's: Present bilaterally  Clonus: 2 beats bilateral lower extremity     Incision well healed       Significant Labs:  Recent Labs   Lab 01/22/23  0427   GLU 66*      K 4.0      CO2 24   BUN 14   CREATININE 0.5   CALCIUM 8.6*   MG 1.6       Recent Labs   Lab 01/22/23  0427 01/23/23  0328   WBC 2.35* 1.82*   HGB 8.8* 8.9*   HCT 29.4* 30.7*   PLT 83* 88*       No results for input(s): LABPT, INR, APTT in the last 48 hours.    Microbiology Results (last 7 days)       Procedure Component Value Units Date/Time    Blood culture [[id number]] Collected: 01/20/23 1131    Order Status: Completed Specimen: Blood Updated: 01/23/23 1412     Blood Culture, Routine No Growth to date      No Growth to date      No Growth to date      No Growth to date    Narrative:      Collection has been rescheduled by [facility] at 01/20/2023 11:03 Reason:   Patient unavailable in room with doctors   Collection has been rescheduled by 2 at 01/20/2023 11:03 Reason:   Patient unavailable in room with doctors     Blood culture  [[id number]] Collected: 01/20/23 1131    Order Status: Completed Specimen: Blood Updated: 01/23/23 1412     Blood Culture, Routine No Growth to date      No Growth to date      No Growth to date      No Growth to date    Narrative:      Collection has been rescheduled by [facility] at 01/20/2023 11:03 Reason:   Patient unavailable in room with doctors   Collection has been rescheduled by [facility] at 01/20/2023 11:03 Reason:   Patient unavailable in room with doctors     Urine culture [[id number]] Collected: 01/20/23 0130    Order Status: Completed Specimen: Urine Updated: 01/21/23 1144     Urine Culture, Routine Multiple organisms isolated. None in predominance.  Repeat if      clinically necessary.    Narrative:      Specimen Source->Urine    Blood culture [[id number]]     Order Status: Canceled Specimen: Blood     Culture, Respiratory with Gram Stain [[id number]]     Order Status: Canceled Specimen: Respiratory           All pertinent labs from the last 24 hours have been reviewed.    Significant Diagnostics:  CT C-spine:  Vertebrae: The dens, lateral masses, and occipital condyles are intact.  There is no evidence of fracture or dislocation.     Discs: Multilevel disc height loss and degenerative endplate change.  Prominent C6 inferior endplate Schmorl's node, similar in appearance dating back to MRI from 02/14/2022.     Degenerative changes: Sent spinal canal dimensions at C3-C4 are 10.5 mm, 8.5 mm at C4-C5, 10.0 mm at C5-C6, and 10.8 mm at C6-C7 .  Multilevel uncovertebral spurring with multilevel neural foraminal narrowing most pronounced at C5-C6 with moderate right neural foraminal narrowing and C6-C7 with moderate left neural foraminal narrowing.      Assessment/Plan:     * Weakness of both lower extremities  Pt is 42F multiple spinal surgeries and revisions last T10- Pelvis in March 2022 who presented to OSH with concern for shoulder infection and UTI found to have E coli sepsis who has had progressive worsening BLE  weakness, XR showed possible worsening proximal junctional kyphotic deformity. Transferred to [facility] [place] [place] to  and neurosurgery was consulted    Plan:  Admitted to  floor status  -- MRI C/T/L spine 1/20 with C7 SP enhancement, focal kyphosis at T8-10 with severe anterior height loss at T9, enhancing C6 inferior endplate Schmorl's node  -- CT T/L spine thin cut 1/20 with haloing of pelvic and T10 screws, spondylodiscitis T8-9, T9-10  -- CT C spine with concern for discitis at C6-7  -- flex ext XR done, limited view of C6-7 in swimmers view 2/2 shoulders  -- Continue amoxicillin at this time, f/u further ID recs. BCx NGTD.  -- 1/20 ESR 70, CRP 9.4. elevated from prior  -- Booked for Tuesday for T4-Pelvis revision extension, T9 corpectomy. NPO at midnight. Primary team to document risk stratification. Plt transfusion today. Plt goal > 100k.   -- TLSO at bedside  -- multimodal pain control per primary medicine team  NSGY will continue to follow. Please contact team with any further questions.        Lazara Yost PA-C  Neurosurgery  [facility] - Neurosurgery ([facility])

## 2023-01-23 NOTE — SUBJECTIVE & OBJECTIVE
Interval History: NAEON. AFVSS. Plan for OR tomorrow. NPO at midnight. Plt transfusion today for goal > 100k. Exam stable.     Medications:  Continuous Infusions:  Scheduled Meds:   acetaminophen  650 mg Oral Q8H    amoxicillin  1,000 mg Oral Q12H    buPROPion  300 mg Oral Daily    gabapentin  400 mg Oral TID    lactulose  20 g Oral TID    methocarbamoL  1,000 mg Oral Q8H    pantoprazole  40 mg Oral Daily    potassium chloride  20 mEq Oral Daily     PRN Meds:sodium chloride, dextrose 10%, dextrose 10%, glucagon (human recombinant), glucose, glucose, LORazepam, magnesium hydroxide 400 mg/5 ml, melatonin, naloxone, oxyCODONE, polyethylene glycol, promethazine, sodium chloride 0.9%     Review of Systems  Objective:     Weight: 104 kg (229 lb 4.5 oz)  Body mass index is 35.91 kg/m².  Vital Signs (Most Recent):  Temp: 98.5 °F (36.9 °C) (01/23/23 1105)  Pulse: 93 (01/23/23 1105)  Resp: 16 (01/23/23 1254)  BP: (!) 124/58 (01/23/23 1105)  SpO2: 97 % (01/23/23 1105) Vital Signs (24h Range):  Temp:  [97.2 °F (36.2 °C)-98.5 °F (36.9 °C)] 98.5 °F (36.9 °C)  Pulse:  [93-95] 93  Resp:  [16-18] 16  SpO2:  [94 %-97 %] 97 %  BP: ()/(58-61) 124/58     Date 01/23/23 0700 - 01/24/23 0659   Shift 6521-4585 1180-1561 4614-3981 24 Hour Total   INTAKE   P.O. 450   450   Shift Total(mL/kg) 450(4.3)   450(4.3)   OUTPUT   Urine(mL/kg/hr) 350(0.4)   350   Shift Total(mL/kg) 350(3.4)   350(3.4)   Weight (kg) 104 104 104 104                            Neurosurgery Physical Exam  General: well developed, well nourished, no distress.   Head: normocephalic, atraumatic  Neurologic: Alert and oriented. Thought content appropriate.  GCS: Motor: 6/Verbal: 5/Eyes: 4 GCS Total: 15  Mental Status: Awake, Alert, Oriented x 4  Language: No aphasia  Speech: No dysarthria  Cranial nerves: face symmetric, tongue midline, CN II-XII grossly intact.   Eyes: pupils equal, round, reactive to light with accommodation, EOMI, nystagmus.   Pulmonary: normal  respirations, no signs of respiratory distress  Abdomen: soft, non-distended, not tender to palpation  Skin: Skin is warm, dry and intact.  Sensory: intact to light touch throughout     Motor Strength:Moves all extremities spontaneously with good tone.  Full strength upper and extremities. No abnormal movements seen.      Strength   Deltoids Triceps Biceps Wrist Extension Wrist Flexion Hand    Upper: R 5/5 5/5 5/5 5/5 5/5 5/5     L 5/5 5/5 5/5 5/5 5/5 5/5       Iliopsoas Quadriceps Knee  Flexion Tibialis  anterior Gastro- cnemius EHL   Lower: R 2/5 1/5 2/5 1/5 1/5 1/5     L 2/5 2/5 1/5 1/5 1/5 1/5      Reflexes:   DTR: 2+ symmetrically throughout.  Parker's: Negative.  Babinski's: Present bilaterally  Clonus: 2 beats bilateral lower extremity     Incision well healed       Significant Labs:  Recent Labs   Lab 01/22/23  0427   GLU 66*      K 4.0      CO2 24   BUN 14   CREATININE 0.5   CALCIUM 8.6*   MG 1.6       Recent Labs   Lab 01/22/23  0427 01/23/23  0328   WBC 2.35* 1.82*   HGB 8.8* 8.9*   HCT 29.4* 30.7*   PLT 83* 88*       No results for input(s): LABPT, INR, APTT in the last 48 hours.    Microbiology Results (last 7 days)       Procedure Component Value Units Date/Time    Blood culture [[id number]] Collected: 01/20/23 1131    Order Status: Completed Specimen: Blood Updated: 01/23/23 1412     Blood Culture, Routine No Growth to date      No Growth to date      No Growth to date      No Growth to date    Narrative:      Collection has been rescheduled by [facility] at 01/20/2023 11:03 Reason:   Patient unavailable in room with doctors   Collection has been rescheduled by [facility] at 01/20/2023 11:03 Reason:   Patient unavailable in room with doctors     Blood culture [[id number]] Collected: 01/20/23 1131    Order Status: Completed Specimen: Blood Updated: 01/23/23 1412     Blood Culture, Routine No Growth to date      No Growth to date      No Growth to date      No Growth to date    Narrative:       Collection has been rescheduled by [facility] at 01/20/2023 11:03 Reason:   Patient unavailable in room with doctors   Collection has been rescheduled by [facility] at 01/20/2023 11:03 Reason:   Patient unavailable in room with doctors     Urine culture [[id number]] Collected: 01/20/23 0130    Order Status: Completed Specimen: Urine Updated: 01/21/23 1144     Urine Culture, Routine Multiple organisms isolated. None in predominance.  Repeat if      clinically necessary.    Narrative:      Specimen Source->Urine    Blood culture [[id number]]     Order Status: Canceled Specimen: Blood     Culture, Respiratory with Gram Stain [[id number]]     Order Status: Canceled Specimen: Respiratory           All pertinent labs from the last 24 hours have been reviewed.    Significant Diagnostics:  CT C-spine:  Vertebrae: The dens, lateral masses, and occipital condyles are intact.  There is no evidence of fracture or dislocation.     Discs: Multilevel disc height loss and degenerative endplate change.  Prominent C6 inferior endplate Schmorl's node, similar in appearance dating back to MRI from 02/14/2022.     Degenerative changes: Sent spinal canal dimensions at C3-C4 are 10.5 mm, 8.5 mm at C4-C5, 10.0 mm at C5-C6, and 10.8 mm at C6-C7 .  Multilevel uncovertebral spurring with multilevel neural foraminal narrowing most pronounced at C5-C6 with moderate right neural foraminal narrowing and C6-C7 with moderate left neural foraminal narrowing.

## 2023-01-23 NOTE — SUBJECTIVE & OBJECTIVE
Interval History: NAEON. Pt repots pain adequately controlled.    Review of Systems   Constitutional:  Negative for fever.   HENT:  Negative for congestion and rhinorrhea.    Eyes:  Negative for visual disturbance.   Respiratory:  Negative for shortness of breath.    Cardiovascular:  Negative for chest pain.   Gastrointestinal:  Negative for abdominal distention, abdominal pain, nausea and vomiting.   Genitourinary:  Negative for dysuria and hematuria.   Musculoskeletal:  Positive for back pain. Negative for neck pain.   Neurological:  Positive for weakness. Negative for dizziness, seizures, light-headedness, numbness and headaches.   Objective:     Vital Signs (Most Recent):  Temp: 98.5 °F (36.9 °C) (01/23/23 1105)  Pulse: 93 (01/23/23 1105)  Resp: 16 (01/23/23 1254)  BP: (!) 124/58 (01/23/23 1105)  SpO2: 97 % (01/23/23 1105)   Vital Signs (24h Range):  Temp:  [97.2 °F (36.2 °C)-98.5 °F (36.9 °C)] 98.5 °F (36.9 °C)  Pulse:  [93-95] 93  Resp:  [16-18] 16  SpO2:  [94 %-97 %] 97 %  BP: ()/(58-61) 124/58     Weight: 104 kg (229 lb 4.5 oz)  Body mass index is 35.91 kg/m².    Intake/Output Summary (Last 24 hours) at 1/23/2023 1506  Last data filed at 1/23/2023 0727  Gross per 24 hour   Intake 550 ml   Output 350 ml   Net 200 ml      Physical Exam  Vitals and nursing note reviewed.   Constitutional:       General: She is not in acute distress.     Appearance: She is obese. She is not ill-appearing, toxic-appearing or diaphoretic.   HENT:      Head: Normocephalic.      Mouth/Throat:      Mouth: Mucous membranes are moist.   Eyes:      General: No scleral icterus.        Right eye: No discharge.         Left eye: No discharge.   Cardiovascular:      Rate and Rhythm: Normal rate and regular rhythm.      Heart sounds: Murmur heard.   Pulmonary:      Effort: Pulmonary effort is normal. No respiratory distress.      Breath sounds: Normal breath sounds.   Abdominal:      General: Bowel sounds are normal.      Palpations:  Abdomen is soft.      Tenderness: There is no abdominal tenderness.   Musculoskeletal:      Cervical back: No rigidity.      Right lower leg: No edema (Mild).      Left lower leg: No edema (Mild).   Skin:     General: Skin is warm and dry.      Coloration: Skin is not jaundiced.   Neurological:      Mental Status: She is alert and oriented to person, place, and time. Mental status is at baseline.      Motor: Weakness (Bilateral LE) and tremor (Bilateral UE) present.      Deep Tendon Reflexes: Reflexes abnormal. Babinski sign present on the right side. Babinski sign present on the left side.   Psychiatric:         Mood and Affect: Mood normal.         Behavior: Behavior normal.       Significant Labs: All pertinent labs within the past 24 hours have been reviewed.  CBC:   Recent Labs   Lab 01/22/23  0427 01/23/23  0328   WBC 2.35* 1.82*   HGB 8.8* 8.9*   HCT 29.4* 30.7*   PLT 83* 88*     CMP:   Recent Labs   Lab 01/22/23  0427      K 4.0      CO2 24   GLU 66*   BUN 14   CREATININE 0.5   CALCIUM 8.6*   PROT 7.2   ALBUMIN 2.1*   BILITOT 1.8*   ALKPHOS 117   AST 39   ALT 20   ANIONGAP 6*       Significant Imaging: I have reviewed all pertinent imaging results/findings within the past 24 hours.

## 2023-01-23 NOTE — PROGRESS NOTES
"Roldan Ca - Neurosurgery ([facility])  Infectious Disease  Progress Note    Patient Name: Rachel Zazueta  MRN: [id number]  Admission Date: 1/19/2023  Length of Stay: 3 days  Attending Physician: Oliver Méndez MD  Primary Care Provider: Dannielle Merino DO    Isolation Status: Contact  Assessment/Plan:      Thoracic discitis     42-year-old woman known to ID with hx of IVDU, chronic HCV with cirrhosis, obesity, neurogenic bladder, chronic pancytopenia, s/p spinal fusion (04/2021), lumbar epidural abscess (+ GBS) with removal of hardware (02/2022), and T10-pelvis spinal fusion with hardware (03/2022), s/p 6 weeks IV abx followed by chronic amoxicillin suppression, recent history of pyogenic arthritis of right shoulder s/p  I&D on 11/15/22 with cx positive for E Coli ( incomplete treatment, though patient reports she completed course Bactrim), who was admitted to [facility]  12/23/22 with back pain and increasing weakness in the lower extremities,  ESBL Coli bacteremia (blood cx cleared 12/28, tx with meropenem approx 14 days).  During that admission she developed progressive LE weakness, and was transferred to [facility] for advanced imaging and Neurosurgery evaluation.  See HPI for more detailed hx.       MRI  Imaging on admission here concerning for worsening T9-T10 compression fractures and kyphosis, "haloing" of T10 and pelvic screws, extensive edema in the paraspinal muscles, possible sacroilitis.   CT with spondylodiscitis T8-9, T9-10.  No signs of persistent infection in left shoulder on exam.   Neurosurgery planning hardware removal and washout on 1/24.     Blood cultures NGTD.  Afebrile.  ESR 70, CRP 9.4.  Urine cx with multiple organisms none in predominance (no urinary symptoms).  She is on oral amoxicillin (chronic suppressive therapy).     Plan/recommendations:  1.  Continue amoxicillin for now until surgery so long as stable and afebrile   2.  Follow blood cultures  3.  If clinically deteriorates, " recommend starting meropenem 2 g IV q 8 hours   4.  Please send surgical cultures (aerobic, anaerobic, fungal and afb) and pathology from areas of concern for discitis (C6-7, T8-9, T9-10?)   5.  Will follow up with further.     Data, history, imaging and plan reviewed with ID staff, Dr. Hayes.           Thank you.   Please Secure Chat for any questions or concerns.  CÉSAR Lloyd, ANP-C      Subjective:     Principal Problem:Weakness of both lower extremities    HPI: Ms. Zazueta is a 42-year-old woman known to ID with hx of IVDU, IV drug use (methamphetamine), chronic HCV with cirrhosis, chronic pancytopenia, spinal fusion (04/2021), epidural abscess (GBS) with removal of hardware (02/2022), T10-pelvis spinal fusion with hardware (03/2022), obesity,  and neurogenic bladder who was admitted to [facility] on 12/23/22 with back pain and increasing weakness in the lower extremities.       Urine and blood cx from that admission + for ESBL E coli which was treated with ertapenem.  Repeat blood cx on 12/28 and 1/10 negative.   Patient started on physical therapy but weakness in the lower extremities increased, and the patient is no longer able to walk. She is able to urinate intermittently and defecate.      An MRI cervical spine 1/10 was significant  for multilevel spondylosis.  MRI brain with nonspecific findings.  Xray of spine notable for focal kyphosis at T9-10 increased when compared to prior scoliosis radiographs.  An MRI of the spine was not available due to the patient's size. and she was transferred to [facility] for advanced imaging and Neurosurgery evaluation.      MRI  imaging here  on 1/20 concerning for worsening kyphosis, extensive edema in the paraspinal muscle. CT with spondylodiscitis T8-9, T9-10    Neurosurgery planning hardware removal and washout on 1/24    Of note, review of records show admission to [facility] 11/14/2022 to 11/22/2022 with pyogenic arthritis of the left  shoulder, s/p I&D on 11/15 with cx positive for E Coli.  Utox was positive for methadone, opiates, amphetamines, and THC.  Seen by ID at [facility] who recommend IV ceftriaxone X 6 weeks at [facility], but patient was not agreeable to this.  Offered  daily ceftriaxone infusions at infusion center.  At discharge from there she had received 8 days of ceftriaxone. Review of notes show she was subsequently prescribed 4 weeks of Bactrim 2 DS tabs twice daily.     Interval History: No acute events overnight. Afebrile.  Chronic leukopenia.   Back spasms yesterday, relieved with Robaxin. Reports improved movement left leg today   Pending hardware removal/revision 1/24  Remains on amoxicillin    Review of Systems   Constitutional:  Positive for activity change and fatigue. Negative for appetite change, chills, diaphoresis and fever.   HENT: Negative.     Eyes:  Positive for visual disturbance.   Respiratory:  Negative for cough, shortness of breath and wheezing.    Cardiovascular:  Negative for chest pain and leg swelling.   Gastrointestinal:  Negative for abdominal pain, constipation, diarrhea, nausea and vomiting.   Genitourinary:  Negative for difficulty urinating and dysuria.   Musculoskeletal:  Positive for back pain (across mid back. Spasms). Negative for arthralgias and neck pain.   Skin:  Negative for rash and wound.   Neurological:  Positive for weakness (bilateral lower extremity weakness L>R.  Improved movement of LLE today) and numbness. Negative for dizziness and headaches.   Psychiatric/Behavioral:  Negative for agitation and confusion.    Objective:     Vital Signs (Most Recent):  Temp: 98.5 °F (36.9 °C) (01/22/23 1100)  Pulse: 100 (01/22/23 1100)  Resp: 18 (01/22/23 1538)  BP: 110/63 (01/22/23 1100)  SpO2: 96 % (01/22/23 1100) Vital Signs (24h Range):  Temp:  [97.5 °F (36.4 °C)-98.7 °F (37.1 °C)] 98.5 °F (36.9 °C)  Pulse:  [100-107] 100  Resp:  [16-18] 18  SpO2:  [93 %-96 %] 96 %  BP: (106-113)/(63-70) 110/63      Weight: 104 kg (229 lb 4.5 oz)  Body mass index is 35.91 kg/m².    Estimated Creatinine Clearance: 181.9 mL/min (based on SCr of 0.5 mg/dL).    Physical Exam  Vitals and nursing note reviewed.   Constitutional:       General: She is not in acute distress.     Appearance: She is obese. She is not ill-appearing, toxic-appearing or diaphoretic.   HENT:      Head: Normocephalic.      Mouth/Throat:      Mouth: Mucous membranes are moist.   Eyes:      General: No scleral icterus.     Conjunctiva/sclera: Conjunctivae normal.   Cardiovascular:      Rate and Rhythm: Normal rate and regular rhythm.      Heart sounds: Murmur heard.   Pulmonary:      Effort: Pulmonary effort is normal. No respiratory distress.      Breath sounds: Normal breath sounds. No wheezing or rales.   Abdominal:      General: There is no distension.      Palpations: Abdomen is soft.      Tenderness: There is no abdominal tenderness. There is no guarding.   Musculoskeletal:         General: No tenderness.      Cervical back: No rigidity or tenderness.      Right lower leg: No edema.      Left lower leg: No edema.      Comments: Left shoulder with well healed arthroscopy scar.  No tenderness, warmth, swelling, or erythema.    Skin:     General: Skin is warm and dry.      Comments: Bilateral lower extremity stasis changes   tattoos   Neurological:      Mental Status: She is alert and oriented to person, place, and time. Mental status is at baseline.      Sensory: Sensory deficit (Decreased BLE) present.      Motor: Weakness (Bilateral LE L>R.  Able to lift LLE today. Improved from yesterday) present.   Psychiatric:         Mood and Affect: Mood normal.         Behavior: Behavior normal.     Significant Labs: Blood Culture:   Recent Labs   Lab 01/10/23  0616 01/10/23  0621 01/10/23  2344 01/10/23  2348 01/20/23  1131   LABBLOO No growth after 5 days. No growth after 5 days. No growth after 5 days. No growth after 5 days. No Growth to date  No Growth to  date  No Growth to date  No Growth to date  No Growth to date  No Growth to date     CBC:   Recent Labs   Lab 01/21/23 0319 01/22/23  0427   WBC 2.08* 2.35*   HGB 8.9* 8.8*   HCT 29.5* 29.4*   PLT 83* 83*     CMP:   Recent Labs   Lab 01/21/23 0319 01/22/23  0427   * 137   K 3.7 4.0    107   CO2 24 24   GLU 74 66*   BUN 16 14   CREATININE 0.5 0.5   CALCIUM 8.3* 8.6*   PROT 6.9 7.2   ALBUMIN 2.0* 2.1*   BILITOT 1.6* 1.8*   ALKPHOS 121 117   AST 39 39   ALT 22 20   ANIONGAP 6* 6*     Urine Culture:   Recent Labs   Lab 12/23/22  0501 01/20/23  0130   LABURIN JACINTA ALBICANS  50,000 - 99,999 cfu/ml  Treatment of asymptomatic candiduria is not recommended (except for   specific populations). Candida isolated in the urine typically   represents colonization. If an indwelling urinary catheter is present  it should be removed or replaced.  * Multiple organisms isolated. None in predominance.  Repeat if  clinically necessary.     Urine Studies:   Recent Labs   Lab 12/23/22  0501 01/20/23  0130   COLORU Yellow Yellow   APPEARANCEUA Clear Clear   PHUR 6.0 7.0   SPECGRAV 1.025 1.025   PROTEINUA Trace* Trace*   GLUCUA Negative Negative   KETONESU Negative Negative   BILIRUBINUA 1+* Negative   OCCULTUA Negative 2+*   NITRITE Negative Negative   UROBILINOGEN 1.0  --    LEUKOCYTESUR 1+* 2+*   RBCUA 6* >100*   WBCUA 13* 76*   BACTERIA Negative Occasional   SQUAMEPITHEL 1 6   HYALINECASTS 2.3*  --      Wound Culture:   Recent Labs   Lab 11/15/22  1301   LABAERO ESCHERICHIA COLI  Few  *       Significant Imaging: I have reviewed all pertinent imaging results/findings within the past 24 hours.

## 2023-01-23 NOTE — ASSESSMENT & PLAN NOTE
42 y.o F w/ hx of IV drug use (methamphetamine), chronic HCV with cirrhosis, spinal fusion (04/2021), epidural abscess with removal of hardware (02/2022), spinal fusion with hardware (T10 - Pelvis / 03/2022), obesity (BMI 39.3) and neurogenic bladder who initially presented to [facility] for evaluation of back pain and left leg weakness.  She reports initially noting the left leg weakness when she was about to go to the bathroom and was about to fall but was assisted by her boyfriend.   During hospitalization, she reports the weakness that started out in her left leg initially then spread upwards to her left thigh, left hip, then crossed over to the right hip and spread to her right leg.  Denies recent IV drug use. She reports her last incidence of drug use was more than 3 years ago and also denies tobacco, and alcohol abuse.  Reports cannabis use. back pain, lower extremity weakness(initally L>R, but now R>L), diarrhea.  Denies constipation, bladder or bowel incontinence, dysuria, dark urine. On further chart review, OSH records show that pt relapsed to IVDU prior to admission.    OSH evaluation for weakness  MRI brain: Nonspecific gliotic white matter signal change within bilateral cerebral hemispheres similar in extent and distribution greater than expected for patient age.  This may be related to chronic small vessel ischemia.  Numerous chronic systemic illnesses can result in this appearance. Demyelinating disease is also in the differential.  MRI cervical spine: C3-C7 multilevel spondylosis  MRI spine unable to be performed due to body habitus    Transfer to [facility] for neurosurgery evaluation, neurology evaluation and MRI    Differential diagnoses include, but not limited to:  - Multiple Sclerosis(history of past neurologic deficits, MRI brain with demyelinating disease concerns)  - Guillan [place] Syndrome(recent UTI infection, ascending pattern of weakness)  - Inflammatory myopathy(physical exam with  proximal LE muscle weakness)  - Infected hardware vs. Spinal abscess(hx of infected hardware, IVDU is a risk factor)  - Central cord syndrome(less likely as weakness in LE>UE which would be atypical)   - Most likely etiology is infected hardware and discitis + kyphosis of thoracolumbar spine. Finished course of Meropenem at OSH for ESBL bacteremia, with repeat BC NGTD.    PLAN  - NSGY following. To OR for hardware removal, washout, and re-instrumentation/extension of fusion to T4 on 1/24/23  - Platelet transfusion pre-operatively with goal >100k   - ID following  - Repeat UA dirty, urine culture polymicrobial w/o predominant organism  - Repeat BCx negative thusfar  - Patient stable, not septic. Continue prophylaxis amoxicillin due to history of infected hardware.    - ID following, appreciate recs

## 2023-01-23 NOTE — SUBJECTIVE & OBJECTIVE
Interval History: No acute events overnight. Afebrile.  Chronic leukopenia.   Back spasms yesterday, relieved with Robaxin. Reports improved movement left leg today   Pending hardware removal/revision 1/24  Remains on amoxicillin    Review of Systems   Constitutional:  Positive for activity change and fatigue. Negative for appetite change, chills, diaphoresis and fever.   HENT: Negative.     Eyes:  Positive for visual disturbance.   Respiratory:  Negative for cough, shortness of breath and wheezing.    Cardiovascular:  Negative for chest pain and leg swelling.   Gastrointestinal:  Negative for abdominal pain, constipation, diarrhea, nausea and vomiting.   Genitourinary:  Negative for difficulty urinating and dysuria.   Musculoskeletal:  Positive for back pain (across mid back. Spasms). Negative for arthralgias and neck pain.   Skin:  Negative for rash and wound.   Neurological:  Positive for weakness (bilateral lower extremity weakness L>R.  Improved movement of LLE today) and numbness. Negative for dizziness and headaches.   Psychiatric/Behavioral:  Negative for agitation and confusion.    Objective:     Vital Signs (Most Recent):  Temp: 98.5 °F (36.9 °C) (01/22/23 1100)  Pulse: 100 (01/22/23 1100)  Resp: 18 (01/22/23 1538)  BP: 110/63 (01/22/23 1100)  SpO2: 96 % (01/22/23 1100) Vital Signs (24h Range):  Temp:  [97.5 °F (36.4 °C)-98.7 °F (37.1 °C)] 98.5 °F (36.9 °C)  Pulse:  [100-107] 100  Resp:  [16-18] 18  SpO2:  [93 %-96 %] 96 %  BP: (106-113)/(63-70) 110/63     Weight: 104 kg (229 lb 4.5 oz)  Body mass index is 35.91 kg/m².    Estimated Creatinine Clearance: 181.9 mL/min (based on SCr of 0.5 mg/dL).    Physical Exam  Vitals and nursing note reviewed.   Constitutional:       General: She is not in acute distress.     Appearance: She is obese. She is not ill-appearing, toxic-appearing or diaphoretic.   HENT:      Head: Normocephalic.      Mouth/Throat:      Mouth: Mucous membranes are moist.   Eyes:      General:  No scleral icterus.     Conjunctiva/sclera: Conjunctivae normal.   Cardiovascular:      Rate and Rhythm: Normal rate and regular rhythm.      Heart sounds: Murmur heard.   Pulmonary:      Effort: Pulmonary effort is normal. No respiratory distress.      Breath sounds: Normal breath sounds. No wheezing or rales.   Abdominal:      General: There is no distension.      Palpations: Abdomen is soft.      Tenderness: There is no abdominal tenderness. There is no guarding.   Musculoskeletal:         General: No tenderness.      Cervical back: No rigidity or tenderness.      Right lower leg: No edema.      Left lower leg: No edema.      Comments: Left shoulder with well healed arthroscopy scar.  No tenderness, warmth, swelling, or erythema.    Skin:     General: Skin is warm and dry.      Comments: Bilateral lower extremity stasis changes   tattoos   Neurological:      Mental Status: She is alert and oriented to person, place, and time. Mental status is at baseline.      Sensory: Sensory deficit (Decreased BLE) present.      Motor: Weakness (Bilateral LE L>R.  Able to lift LLE today. Improved from yesterday) present.   Psychiatric:         Mood and Affect: Mood normal.         Behavior: Behavior normal.     Significant Labs: Blood Culture:   Recent Labs   Lab 01/10/23  0616 01/10/23  0621 01/10/23  2344 01/10/23  2348 01/20/23  1131   LABBLOO No growth after 5 days. No growth after 5 days. No growth after 5 days. No growth after 5 days. No Growth to date  No Growth to date  No Growth to date  No Growth to date  No Growth to date  No Growth to date     CBC:   Recent Labs   Lab 01/21/23  0319 01/22/23  0427   WBC 2.08* 2.35*   HGB 8.9* 8.8*   HCT 29.5* 29.4*   PLT 83* 83*     CMP:   Recent Labs   Lab 01/21/23  0319 01/22/23  0427   * 137   K 3.7 4.0    107   CO2 24 24   GLU 74 66*   BUN 16 14   CREATININE 0.5 0.5   CALCIUM 8.3* 8.6*   PROT 6.9 7.2   ALBUMIN 2.0* 2.1*   BILITOT 1.6* 1.8*   ALKPHOS 121 117    AST 39 39   ALT 22 20   ANIONGAP 6* 6*     Urine Culture:   Recent Labs   Lab 12/23/22  0501 01/20/23  0130   LABURIN JACINTA ALBICANS  50,000 - 99,999 cfu/ml  Treatment of asymptomatic candiduria is not recommended (except for   specific populations). Candida isolated in the urine typically   represents colonization. If an indwelling urinary catheter is present  it should be removed or replaced.  * Multiple organisms isolated. None in predominance.  Repeat if  clinically necessary.     Urine Studies:   Recent Labs   Lab 12/23/22  0501 01/20/23  0130   COLORU Yellow Yellow   APPEARANCEUA Clear Clear   PHUR 6.0 7.0   SPECGRAV 1.025 1.025   PROTEINUA Trace* Trace*   GLUCUA Negative Negative   KETONESU Negative Negative   BILIRUBINUA 1+* Negative   OCCULTUA Negative 2+*   NITRITE Negative Negative   UROBILINOGEN 1.0  --    LEUKOCYTESUR 1+* 2+*   RBCUA 6* >100*   WBCUA 13* 76*   BACTERIA Negative Occasional   SQUAMEPITHEL 1 6   HYALINECASTS 2.3*  --      Wound Culture:   Recent Labs   Lab 11/15/22  1301   LABAERO ESCHERICHIA COLI  Few  *       Significant Imaging: I have reviewed all pertinent imaging results/findings within the past 24 hours.

## 2023-01-23 NOTE — ASSESSMENT & PLAN NOTE
"   42-year-old woman known to ID with hx of IVDU, chronic HCV with cirrhosis, obesity, neurogenic bladder, chronic pancytopenia, s/p spinal fusion (04/2021), lumbar epidural abscess (+ GBS) with removal of hardware (02/2022), and T10-pelvis spinal fusion with hardware (03/2022), s/p 6 weeks IV abx followed by chronic amoxicillin suppression, recent history of pyogenic arthritis of right shoulder s/p  I&D on 11/15/22 with cx positive for E Coli ( incomplete treatment, though patient reports she completed course Bactrim), who was admitted to [facility]  12/23/22 with back pain and increasing weakness in the lower extremities,  ESBL Coli bacteremia (blood cx cleared 12/28, tx with meropenem approx 14 days).  During that admission she developed progressive LE weakness, and was [place] [facility] [place] and [place] evaluation.  See HPI for more detailed hx.       MRI  Imaging on admission here concerning for worsening T9-T10 compression fractures and kyphosis, "haloing" of T10 and pelvic screws, extensive edema in the paraspinal muscles, possible sacroilitis.   CT with spondylodiscitis T8-9, T9-10.  No signs of persistent infection in left shoulder on exam.   Neurosurgery planning hardware removal and washout on 1/24.     Blood cultures NGTD.  Afebrile.  ESR 70, CRP 9.4.  Urine cx with multiple organisms none in predominance (no urinary symptoms).  She is on oral amoxicillin (chronic suppressive therapy).     Plan/recommendations:  1.  Continue amoxicillin for now until surgery so long as stable and afebrile   2.  Follow blood cultures  3.  If clinically deteriorates, recommend starting meropenem 2 g IV q 8 hours   4.  Please send surgical cultures (aerobic, anaerobic, fungal and afb) and pathology from areas of concern for discitis (C6-7, T8-9, T9-10?)   5.  Will follow up with further.     Data, history, imaging and plan reviewed with ID staff, Dr. Hayes.         "

## 2023-01-23 NOTE — PLAN OF CARE
01/23/23 0924   Post-Acute Status   Post-Acute Authorization Placement   Post-Acute Placement Status Referrals Sent     Sent off SNF Referral today.

## 2023-01-24 ENCOUNTER — ANESTHESIA (OUTPATIENT)
Dept: SURGERY | Facility: HOSPITAL | Age: 43
DRG: 453 | End: 2023-01-24
Payer: MEDICAID

## 2023-01-24 LAB
ALBUMIN SERPL BCP-MCNC: 2.2 G/DL (ref 3.5–5.2)
ALP SERPL-CCNC: 123 U/L (ref 55–135)
ALT SERPL W/O P-5'-P-CCNC: 26 U/L (ref 10–44)
ANION GAP SERPL CALC-SCNC: 3 MMOL/L (ref 8–16)
ANION GAP SERPL CALC-SCNC: 3 MMOL/L (ref 8–16)
AST SERPL-CCNC: 43 U/L (ref 10–40)
BASOPHILS # BLD AUTO: 0.01 K/UL (ref 0–0.2)
BASOPHILS # BLD AUTO: 0.01 K/UL (ref 0–0.2)
BASOPHILS NFR BLD: 0.1 % (ref 0–1.9)
BASOPHILS NFR BLD: 0.4 % (ref 0–1.9)
BILIRUB SERPL-MCNC: 1.4 MG/DL (ref 0.1–1)
BLD PROD TYP BPU: NORMAL
BLOOD UNIT EXPIRATION DATE: NORMAL
BLOOD UNIT TYPE CODE: 6200
BLOOD UNIT TYPE CODE: 9500
BLOOD UNIT TYPE: NORMAL
BUN SERPL-MCNC: 13 MG/DL (ref 6–20)
BUN SERPL-MCNC: 14 MG/DL (ref 6–20)
CALCIUM SERPL-MCNC: 7.5 MG/DL (ref 8.7–10.5)
CALCIUM SERPL-MCNC: 8.5 MG/DL (ref 8.7–10.5)
CHLORIDE SERPL-SCNC: 102 MMOL/L (ref 95–110)
CHLORIDE SERPL-SCNC: 108 MMOL/L (ref 95–110)
CO2 SERPL-SCNC: 24 MMOL/L (ref 23–29)
CO2 SERPL-SCNC: 28 MMOL/L (ref 23–29)
CODING SYSTEM: NORMAL
CREAT SERPL-MCNC: 0.5 MG/DL (ref 0.5–1.4)
CREAT SERPL-MCNC: 0.6 MG/DL (ref 0.5–1.4)
DIFFERENTIAL METHOD: ABNORMAL
DIFFERENTIAL METHOD: ABNORMAL
DISPENSE STATUS: NORMAL
EOSINOPHIL # BLD AUTO: 0 K/UL (ref 0–0.5)
EOSINOPHIL # BLD AUTO: 0.1 K/UL (ref 0–0.5)
EOSINOPHIL NFR BLD: 0.1 % (ref 0–8)
EOSINOPHIL NFR BLD: 5.1 % (ref 0–8)
ERYTHROCYTE [DISTWIDTH] IN BLOOD BY AUTOMATED COUNT: 16.7 % (ref 11.5–14.5)
ERYTHROCYTE [DISTWIDTH] IN BLOOD BY AUTOMATED COUNT: 17.4 % (ref 11.5–14.5)
EST. GFR  (NO RACE VARIABLE): >60 ML/MIN/1.73 M^2
EST. GFR  (NO RACE VARIABLE): >60 ML/MIN/1.73 M^2
GLUCOSE SERPL-MCNC: 105 MG/DL (ref 70–110)
GLUCOSE SERPL-MCNC: 137 MG/DL (ref 70–110)
GLUCOSE SERPL-MCNC: 144 MG/DL (ref 70–110)
GLUCOSE SERPL-MCNC: 159 MG/DL (ref 70–110)
GLUCOSE SERPL-MCNC: 81 MG/DL (ref 70–110)
GRAM STN SPEC: NORMAL
HCO3 UR-SCNC: 26.1 MMOL/L (ref 24–28)
HCO3 UR-SCNC: 27 MMOL/L (ref 24–28)
HCO3 UR-SCNC: 29 MMOL/L (ref 24–28)
HCT VFR BLD AUTO: 26.2 % (ref 37–48.5)
HCT VFR BLD AUTO: 31.2 % (ref 37–48.5)
HCT VFR BLD CALC: 23 %PCV (ref 36–54)
HCT VFR BLD CALC: 27 %PCV (ref 36–54)
HCT VFR BLD CALC: 27 %PCV (ref 36–54)
HGB BLD-MCNC: 7.9 G/DL (ref 12–16)
HGB BLD-MCNC: 9.5 G/DL (ref 12–16)
IMM GRANULOCYTES # BLD AUTO: 0.01 K/UL (ref 0–0.04)
IMM GRANULOCYTES # BLD AUTO: 0.08 K/UL (ref 0–0.04)
IMM GRANULOCYTES NFR BLD AUTO: 0.4 % (ref 0–0.5)
IMM GRANULOCYTES NFR BLD AUTO: 0.8 % (ref 0–0.5)
LYMPHOCYTES # BLD AUTO: 0.4 K/UL (ref 1–4.8)
LYMPHOCYTES # BLD AUTO: 0.4 K/UL (ref 1–4.8)
LYMPHOCYTES NFR BLD: 16.2 % (ref 18–48)
LYMPHOCYTES NFR BLD: 3.6 % (ref 18–48)
MAGNESIUM SERPL-MCNC: 1.7 MG/DL (ref 1.6–2.6)
MCH RBC QN AUTO: 27.3 PG (ref 27–31)
MCH RBC QN AUTO: 27.5 PG (ref 27–31)
MCHC RBC AUTO-ENTMCNC: 30.2 G/DL (ref 32–36)
MCHC RBC AUTO-ENTMCNC: 30.4 G/DL (ref 32–36)
MCV RBC AUTO: 90 FL (ref 82–98)
MCV RBC AUTO: 91 FL (ref 82–98)
MONOCYTES # BLD AUTO: 0.1 K/UL (ref 0.3–1)
MONOCYTES # BLD AUTO: 0.2 K/UL (ref 0.3–1)
MONOCYTES NFR BLD: 0.8 % (ref 4–15)
MONOCYTES NFR BLD: 9.4 % (ref 4–15)
NEUTROPHILS # BLD AUTO: 1.6 K/UL (ref 1.8–7.7)
NEUTROPHILS # BLD AUTO: 9.8 K/UL (ref 1.8–7.7)
NEUTROPHILS NFR BLD: 68.5 % (ref 38–73)
NEUTROPHILS NFR BLD: 94.6 % (ref 38–73)
NRBC BLD-RTO: 0 /100 WBC
NRBC BLD-RTO: 0 /100 WBC
PCO2 BLDA: 37.7 MMHG (ref 35–45)
PCO2 BLDA: 37.7 MMHG (ref 35–45)
PCO2 BLDA: 43.9 MMHG (ref 35–45)
PH SMN: 7.43 [PH] (ref 7.35–7.45)
PH SMN: 7.45 [PH] (ref 7.35–7.45)
PH SMN: 7.46 [PH] (ref 7.35–7.45)
PHOSPHATE SERPL-MCNC: 3.9 MG/DL (ref 2.7–4.5)
PLATELET # BLD AUTO: 104 K/UL (ref 150–450)
PLATELET # BLD AUTO: 190 K/UL (ref 150–450)
PMV BLD AUTO: 10.1 FL (ref 9.2–12.9)
PMV BLD AUTO: 9.1 FL (ref 9.2–12.9)
PO2 BLDA: 150 MMHG (ref 80–100)
PO2 BLDA: 236 MMHG (ref 80–100)
PO2 BLDA: 325 MMHG (ref 80–100)
POC BE: 2 MMOL/L
POC BE: 3 MMOL/L
POC BE: 5 MMOL/L
POC IONIZED CALCIUM: 1.06 MMOL/L (ref 1.06–1.42)
POC IONIZED CALCIUM: 1.07 MMOL/L (ref 1.06–1.42)
POC IONIZED CALCIUM: 1.11 MMOL/L (ref 1.06–1.42)
POC SATURATED O2: 100 % (ref 95–100)
POC SATURATED O2: 100 % (ref 95–100)
POC SATURATED O2: 99 % (ref 95–100)
POC TCO2: 27 MMOL/L (ref 23–27)
POC TCO2: 28 MMOL/L (ref 23–27)
POC TCO2: 30 MMOL/L (ref 23–27)
POTASSIUM BLD-SCNC: 3.8 MMOL/L (ref 3.5–5.1)
POTASSIUM BLD-SCNC: 4.8 MMOL/L (ref 3.5–5.1)
POTASSIUM BLD-SCNC: 5 MMOL/L (ref 3.5–5.1)
POTASSIUM SERPL-SCNC: 4.1 MMOL/L (ref 3.5–5.1)
POTASSIUM SERPL-SCNC: 4.7 MMOL/L (ref 3.5–5.1)
PROT SERPL-MCNC: 7.6 G/DL (ref 6–8.4)
RBC # BLD AUTO: 2.89 M/UL (ref 4–5.4)
RBC # BLD AUTO: 3.46 M/UL (ref 4–5.4)
SAMPLE: ABNORMAL
SODIUM BLD-SCNC: 138 MMOL/L (ref 136–145)
SODIUM BLD-SCNC: 139 MMOL/L (ref 136–145)
SODIUM BLD-SCNC: 141 MMOL/L (ref 136–145)
SODIUM SERPL-SCNC: 133 MMOL/L (ref 136–145)
SODIUM SERPL-SCNC: 135 MMOL/L (ref 136–145)
TRANS ERYTHROCYTES VOL PATIENT: NORMAL ML
TRANS ERYTHROCYTES VOL PATIENT: NORMAL ML
UNIT NUMBER: NORMAL
UNIT NUMBER: NORMAL
WBC # BLD AUTO: 10.34 K/UL (ref 3.9–12.7)
WBC # BLD AUTO: 2.35 K/UL (ref 3.9–12.7)

## 2023-01-24 PROCEDURE — 71000033 HC RECOVERY, INTIAL HOUR: Performed by: NEUROLOGICAL SURGERY

## 2023-01-24 PROCEDURE — 22842 PR POSTERIOR SEGMENTAL INSTRUMENTATION 3-6 VRT SEG: ICD-10-PCS | Mod: 80,,, | Performed by: ORTHOPAEDIC SURGERY

## 2023-01-24 PROCEDURE — 80053 COMPREHEN METABOLIC PANEL: CPT | Performed by: STUDENT IN AN ORGANIZED HEALTH CARE EDUCATION/TRAINING PROGRAM

## 2023-01-24 PROCEDURE — 20000000 HC ICU ROOM

## 2023-01-24 PROCEDURE — 83735 ASSAY OF MAGNESIUM: CPT | Performed by: STUDENT IN AN ORGANIZED HEALTH CARE EDUCATION/TRAINING PROGRAM

## 2023-01-24 PROCEDURE — 36415 COLL VENOUS BLD VENIPUNCTURE: CPT | Performed by: STUDENT IN AN ORGANIZED HEALTH CARE EDUCATION/TRAINING PROGRAM

## 2023-01-24 PROCEDURE — 63600175 PHARM REV CODE 636 W HCPCS

## 2023-01-24 PROCEDURE — D9220A PRA ANESTHESIA: Mod: CRNA,,, | Performed by: NURSE ANESTHETIST, CERTIFIED REGISTERED

## 2023-01-24 PROCEDURE — 88300 SURGICAL PATH GROSS: CPT | Mod: 26,,, | Performed by: PATHOLOGY

## 2023-01-24 PROCEDURE — 27201423 OPTIME MED/SURG SUP & DEVICES STERILE SUPPLY: Performed by: NEUROLOGICAL SURGERY

## 2023-01-24 PROCEDURE — 71000015 HC POSTOP RECOV 1ST HR: Performed by: NEUROLOGICAL SURGERY

## 2023-01-24 PROCEDURE — 25000003 PHARM REV CODE 250: Performed by: STUDENT IN AN ORGANIZED HEALTH CARE EDUCATION/TRAINING PROGRAM

## 2023-01-24 PROCEDURE — 63016 PR LAMINECTOMY,>2 SGMT,THORACIC: ICD-10-PCS | Mod: 22,62,, | Performed by: NEUROLOGICAL SURGERY

## 2023-01-24 PROCEDURE — C1729 CATH, DRAINAGE: HCPCS | Performed by: NEUROLOGICAL SURGERY

## 2023-01-24 PROCEDURE — 36620 ARTERIAL: ICD-10-PCS | Mod: 59,,, | Performed by: ANESTHESIOLOGY

## 2023-01-24 PROCEDURE — P9035 PLATELET PHERES LEUKOREDUCED: HCPCS

## 2023-01-24 PROCEDURE — 71000039 HC RECOVERY, EACH ADD'L HOUR: Performed by: NEUROLOGICAL SURGERY

## 2023-01-24 PROCEDURE — P9021 RED BLOOD CELLS UNIT: HCPCS | Performed by: NEUROLOGICAL SURGERY

## 2023-01-24 PROCEDURE — 63016 PR LAMINECTOMY,>2 SGMT,THORACIC: ICD-10-PCS | Mod: 22,62,, | Performed by: ORTHOPAEDIC SURGERY

## 2023-01-24 PROCEDURE — 88300 PR  SURG PATH,GROSS,LEVEL I: ICD-10-PCS | Mod: 26,,, | Performed by: PATHOLOGY

## 2023-01-24 PROCEDURE — 25000003 PHARM REV CODE 250: Performed by: NEUROLOGICAL SURGERY

## 2023-01-24 PROCEDURE — 87102 FUNGUS ISOLATION CULTURE: CPT | Mod: 59 | Performed by: NEUROLOGICAL SURGERY

## 2023-01-24 PROCEDURE — 63600175 PHARM REV CODE 636 W HCPCS: Performed by: ANESTHESIOLOGY

## 2023-01-24 PROCEDURE — 99233 SBSQ HOSP IP/OBS HIGH 50: CPT | Mod: ,,, | Performed by: STUDENT IN AN ORGANIZED HEALTH CARE EDUCATION/TRAINING PROGRAM

## 2023-01-24 PROCEDURE — 80048 BASIC METABOLIC PNL TOTAL CA: CPT | Mod: XB | Performed by: STUDENT IN AN ORGANIZED HEALTH CARE EDUCATION/TRAINING PROGRAM

## 2023-01-24 PROCEDURE — 87205 SMEAR GRAM STAIN: CPT | Mod: 59 | Performed by: NEUROLOGICAL SURGERY

## 2023-01-24 PROCEDURE — 63016 REMOVE SPINE LAMINA >2 THRC: CPT | Mod: 22,62,, | Performed by: NEUROLOGICAL SURGERY

## 2023-01-24 PROCEDURE — 84100 ASSAY OF PHOSPHORUS: CPT | Performed by: STUDENT IN AN ORGANIZED HEALTH CARE EDUCATION/TRAINING PROGRAM

## 2023-01-24 PROCEDURE — 61783 SCAN PROC SPINAL: CPT | Mod: 59,,, | Performed by: NEUROLOGICAL SURGERY

## 2023-01-24 PROCEDURE — 22842 INSERT SPINE FIXATION DEVICE: CPT | Mod: 80,,, | Performed by: ORTHOPAEDIC SURGERY

## 2023-01-24 PROCEDURE — 87206 SMEAR FLUORESCENT/ACID STAI: CPT | Performed by: NEUROLOGICAL SURGERY

## 2023-01-24 PROCEDURE — 37000009 HC ANESTHESIA EA ADD 15 MINS: Performed by: NEUROLOGICAL SURGERY

## 2023-01-24 PROCEDURE — 25000003 PHARM REV CODE 250: Performed by: INTERNAL MEDICINE

## 2023-01-24 PROCEDURE — 25000003 PHARM REV CODE 250

## 2023-01-24 PROCEDURE — 87116 MYCOBACTERIA CULTURE: CPT | Mod: 59 | Performed by: NEUROLOGICAL SURGERY

## 2023-01-24 PROCEDURE — 22842 INSERT SPINE FIXATION DEVICE: CPT | Mod: ,,, | Performed by: NEUROLOGICAL SURGERY

## 2023-01-24 PROCEDURE — D9220A PRA ANESTHESIA: ICD-10-PCS | Mod: CRNA,,, | Performed by: NURSE ANESTHETIST, CERTIFIED REGISTERED

## 2023-01-24 PROCEDURE — 85025 COMPLETE CBC W/AUTO DIFF WBC: CPT | Mod: 91 | Performed by: STUDENT IN AN ORGANIZED HEALTH CARE EDUCATION/TRAINING PROGRAM

## 2023-01-24 PROCEDURE — 11981 INSERTION DRUG DLVR IMPLANT: CPT | Mod: 51,,, | Performed by: NEUROLOGICAL SURGERY

## 2023-01-24 PROCEDURE — 63600175 PHARM REV CODE 636 W HCPCS: Performed by: NURSE ANESTHETIST, CERTIFIED REGISTERED

## 2023-01-24 PROCEDURE — 36000710: Performed by: NEUROLOGICAL SURGERY

## 2023-01-24 PROCEDURE — 61783 PR STEREOTACTIC COMP ASSIST PROC,SPINAL: ICD-10-PCS | Mod: 59,,, | Performed by: NEUROLOGICAL SURGERY

## 2023-01-24 PROCEDURE — 85025 COMPLETE CBC W/AUTO DIFF WBC: CPT | Performed by: STUDENT IN AN ORGANIZED HEALTH CARE EDUCATION/TRAINING PROGRAM

## 2023-01-24 PROCEDURE — 88300 SURGICAL PATH GROSS: CPT | Performed by: PATHOLOGY

## 2023-01-24 PROCEDURE — 63600175 PHARM REV CODE 636 W HCPCS: Performed by: STUDENT IN AN ORGANIZED HEALTH CARE EDUCATION/TRAINING PROGRAM

## 2023-01-24 PROCEDURE — P9035 PLATELET PHERES LEUKOREDUCED: HCPCS | Performed by: PHYSICIAN ASSISTANT

## 2023-01-24 PROCEDURE — 94761 N-INVAS EAR/PLS OXIMETRY MLT: CPT

## 2023-01-24 PROCEDURE — 87176 TISSUE HOMOGENIZATION CULTR: CPT | Performed by: NEUROLOGICAL SURGERY

## 2023-01-24 PROCEDURE — 27201037 HC PRESSURE MONITORING SET UP

## 2023-01-24 PROCEDURE — D9220A PRA ANESTHESIA: ICD-10-PCS | Mod: ANES,,, | Performed by: ANESTHESIOLOGY

## 2023-01-24 PROCEDURE — 22842 PR POSTERIOR SEGMENTAL INSTRUMENTATION 3-6 VRT SEG: ICD-10-PCS | Mod: ,,, | Performed by: NEUROLOGICAL SURGERY

## 2023-01-24 PROCEDURE — 87077 CULTURE AEROBIC IDENTIFY: CPT | Mod: 59 | Performed by: NEUROLOGICAL SURGERY

## 2023-01-24 PROCEDURE — 11981 PR INSERT, DRUG DELIVERY IMPLANT, BIORESORB/BIODEGR/NON-BIODEGR: ICD-10-PCS | Mod: 51,,, | Performed by: NEUROLOGICAL SURGERY

## 2023-01-24 PROCEDURE — 99233 PR SUBSEQUENT HOSPITAL CARE,LEVL III: ICD-10-PCS | Mod: ,,, | Performed by: STUDENT IN AN ORGANIZED HEALTH CARE EDUCATION/TRAINING PROGRAM

## 2023-01-24 PROCEDURE — C1713 ANCHOR/SCREW BN/BN,TIS/BN: HCPCS | Performed by: NEUROLOGICAL SURGERY

## 2023-01-24 PROCEDURE — 87186 SC STD MICRODIL/AGAR DIL: CPT | Mod: 59 | Performed by: NEUROLOGICAL SURGERY

## 2023-01-24 PROCEDURE — 27000207 HC ISOLATION

## 2023-01-24 PROCEDURE — 63016 REMOVE SPINE LAMINA >2 THRC: CPT | Mod: 22,62,, | Performed by: ORTHOPAEDIC SURGERY

## 2023-01-24 PROCEDURE — 63600175 PHARM REV CODE 636 W HCPCS: Performed by: NEUROLOGICAL SURGERY

## 2023-01-24 PROCEDURE — 37000008 HC ANESTHESIA 1ST 15 MINUTES: Performed by: NEUROLOGICAL SURGERY

## 2023-01-24 PROCEDURE — 36000711: Performed by: NEUROLOGICAL SURGERY

## 2023-01-24 PROCEDURE — 36620 INSERTION CATHETER ARTERY: CPT | Mod: 59,,, | Performed by: ANESTHESIOLOGY

## 2023-01-24 PROCEDURE — 25000003 PHARM REV CODE 250: Performed by: NURSE ANESTHETIST, CERTIFIED REGISTERED

## 2023-01-24 PROCEDURE — D9220A PRA ANESTHESIA: Mod: ANES,,, | Performed by: ANESTHESIOLOGY

## 2023-01-24 PROCEDURE — 87075 CULTR BACTERIA EXCEPT BLOOD: CPT | Mod: 59 | Performed by: NEUROLOGICAL SURGERY

## 2023-01-24 PROCEDURE — 87070 CULTURE OTHR SPECIMN AEROBIC: CPT | Performed by: NEUROLOGICAL SURGERY

## 2023-01-24 DEVICE — KIT GRAFT BONE/SUB STIM 20ML: Type: IMPLANTABLE DEVICE | Site: BACK | Status: FUNCTIONAL

## 2023-01-24 RX ORDER — DEXMEDETOMIDINE HYDROCHLORIDE 100 UG/ML
INJECTION, SOLUTION INTRAVENOUS
Status: DISCONTINUED | OUTPATIENT
Start: 2023-01-24 | End: 2023-01-24

## 2023-01-24 RX ORDER — ONDANSETRON 2 MG/ML
INJECTION INTRAMUSCULAR; INTRAVENOUS
Status: DISCONTINUED | OUTPATIENT
Start: 2023-01-24 | End: 2023-01-24

## 2023-01-24 RX ORDER — PHENYLEPHRINE HCL IN 0.9% NACL 1 MG/10 ML
SYRINGE (ML) INTRAVENOUS
Status: DISPENSED
Start: 2023-01-24 | End: 2023-01-25

## 2023-01-24 RX ORDER — NICARDIPINE HYDROCHLORIDE 2.5 MG/ML
INJECTION INTRAVENOUS
Status: DISCONTINUED | OUTPATIENT
Start: 2023-01-24 | End: 2023-01-24

## 2023-01-24 RX ORDER — ACETAMINOPHEN 500 MG
1000 TABLET ORAL 3 TIMES DAILY
Status: DISCONTINUED | OUTPATIENT
Start: 2023-01-24 | End: 2023-02-09

## 2023-01-24 RX ORDER — NICARDIPINE HYDROCHLORIDE 0.2 MG/ML
INJECTION INTRAVENOUS CONTINUOUS PRN
Status: DISCONTINUED | OUTPATIENT
Start: 2023-01-24 | End: 2023-01-24

## 2023-01-24 RX ORDER — MIDAZOLAM HYDROCHLORIDE 1 MG/ML
INJECTION INTRAMUSCULAR; INTRAVENOUS
Status: DISCONTINUED | OUTPATIENT
Start: 2023-01-24 | End: 2023-01-24

## 2023-01-24 RX ORDER — SODIUM CHLORIDE 0.9 % (FLUSH) 0.9 %
10 SYRINGE (ML) INJECTION
Status: DISCONTINUED | OUTPATIENT
Start: 2023-01-24 | End: 2023-01-25

## 2023-01-24 RX ORDER — DIAZEPAM 5 MG/1
5 TABLET ORAL EVERY 6 HOURS PRN
Status: DISCONTINUED | OUTPATIENT
Start: 2023-01-24 | End: 2023-02-10

## 2023-01-24 RX ORDER — HYDROMORPHONE HYDROCHLORIDE 1 MG/ML
0.2 INJECTION, SOLUTION INTRAMUSCULAR; INTRAVENOUS; SUBCUTANEOUS EVERY 5 MIN PRN
Status: DISCONTINUED | OUTPATIENT
Start: 2023-01-24 | End: 2023-01-24 | Stop reason: HOSPADM

## 2023-01-24 RX ORDER — SODIUM CHLORIDE 9 MG/ML
INJECTION, SOLUTION INTRAVENOUS CONTINUOUS
Status: DISCONTINUED | OUTPATIENT
Start: 2023-01-24 | End: 2023-01-24

## 2023-01-24 RX ORDER — HYDROMORPHONE HYDROCHLORIDE 1 MG/ML
0.5 INJECTION, SOLUTION INTRAMUSCULAR; INTRAVENOUS; SUBCUTANEOUS
Status: DISCONTINUED | OUTPATIENT
Start: 2023-01-24 | End: 2023-01-24

## 2023-01-24 RX ORDER — LIDOCAINE HYDROCHLORIDE AND EPINEPHRINE 10; 10 MG/ML; UG/ML
INJECTION, SOLUTION INFILTRATION; PERINEURAL
Status: DISCONTINUED | OUTPATIENT
Start: 2023-01-24 | End: 2023-01-24 | Stop reason: HOSPADM

## 2023-01-24 RX ORDER — SODIUM CHLORIDE 9 MG/ML
INJECTION, SOLUTION INTRAVENOUS CONTINUOUS
Status: DISCONTINUED | OUTPATIENT
Start: 2023-01-24 | End: 2023-01-26

## 2023-01-24 RX ORDER — SODIUM CHLORIDE 0.9 % (FLUSH) 0.9 %
3 SYRINGE (ML) INJECTION
Status: DISCONTINUED | OUTPATIENT
Start: 2023-01-24 | End: 2023-01-24 | Stop reason: HOSPADM

## 2023-01-24 RX ORDER — HYDROMORPHONE HCL IN 0.9% NACL 6 MG/30 ML
PATIENT CONTROLLED ANALGESIA SYRINGE INTRAVENOUS CONTINUOUS
Status: DISCONTINUED | OUTPATIENT
Start: 2023-01-24 | End: 2023-02-08

## 2023-01-24 RX ORDER — LABETALOL HCL 20 MG/4 ML
10 SYRINGE (ML) INTRAVENOUS
Status: DISCONTINUED | OUTPATIENT
Start: 2023-01-24 | End: 2023-01-25

## 2023-01-24 RX ORDER — GABAPENTIN 300 MG/1
600 CAPSULE ORAL 3 TIMES DAILY
Status: DISCONTINUED | OUTPATIENT
Start: 2023-01-24 | End: 2023-01-24

## 2023-01-24 RX ORDER — MUPIROCIN 20 MG/G
OINTMENT TOPICAL 2 TIMES DAILY
Status: DISCONTINUED | OUTPATIENT
Start: 2023-01-24 | End: 2023-01-25

## 2023-01-24 RX ORDER — HALOPERIDOL 5 MG/ML
0.5 INJECTION INTRAMUSCULAR EVERY 10 MIN PRN
Status: DISCONTINUED | OUTPATIENT
Start: 2023-01-24 | End: 2023-01-24 | Stop reason: HOSPADM

## 2023-01-24 RX ORDER — HYDROMORPHONE HYDROCHLORIDE 1 MG/ML
2 INJECTION, SOLUTION INTRAMUSCULAR; INTRAVENOUS; SUBCUTANEOUS
Status: DISCONTINUED | OUTPATIENT
Start: 2023-01-24 | End: 2023-01-24

## 2023-01-24 RX ORDER — GABAPENTIN 300 MG/1
600 CAPSULE ORAL 3 TIMES DAILY
Status: DISCONTINUED | OUTPATIENT
Start: 2023-01-24 | End: 2023-02-09

## 2023-01-24 RX ORDER — LIDOCAINE HYDROCHLORIDE 20 MG/ML
INJECTION, SOLUTION EPIDURAL; INFILTRATION; INTRACAUDAL; PERINEURAL
Status: DISCONTINUED | OUTPATIENT
Start: 2023-01-24 | End: 2023-01-24

## 2023-01-24 RX ORDER — ONDANSETRON 2 MG/ML
4 INJECTION INTRAMUSCULAR; INTRAVENOUS ONCE AS NEEDED
Status: DISCONTINUED | OUTPATIENT
Start: 2023-01-24 | End: 2023-01-24 | Stop reason: HOSPADM

## 2023-01-24 RX ORDER — TRANEXAMIC ACID 100 MG/ML
INJECTION, SOLUTION INTRAVENOUS CONTINUOUS PRN
Status: DISCONTINUED | OUTPATIENT
Start: 2023-01-24 | End: 2023-01-24

## 2023-01-24 RX ORDER — SUCCINYLCHOLINE CHLORIDE 20 MG/ML
INJECTION INTRAMUSCULAR; INTRAVENOUS
Status: DISCONTINUED | OUTPATIENT
Start: 2023-01-24 | End: 2023-01-24

## 2023-01-24 RX ORDER — TRANEXAMIC ACID 100 MG/ML
INJECTION, SOLUTION INTRAVENOUS
Status: DISCONTINUED | OUTPATIENT
Start: 2023-01-24 | End: 2023-01-24

## 2023-01-24 RX ORDER — HEPARIN SODIUM 5000 [USP'U]/ML
5000 INJECTION, SOLUTION INTRAVENOUS; SUBCUTANEOUS EVERY 8 HOURS
Status: DISCONTINUED | OUTPATIENT
Start: 2023-01-25 | End: 2023-01-26

## 2023-01-24 RX ORDER — HYDRALAZINE HYDROCHLORIDE 20 MG/ML
10 INJECTION INTRAMUSCULAR; INTRAVENOUS EVERY 6 HOURS PRN
Status: DISCONTINUED | OUTPATIENT
Start: 2023-01-24 | End: 2023-02-08

## 2023-01-24 RX ORDER — SODIUM CHLORIDE 9 MG/ML
INJECTION, SOLUTION INTRAVENOUS CONTINUOUS PRN
Status: DISCONTINUED | OUTPATIENT
Start: 2023-01-24 | End: 2023-01-24

## 2023-01-24 RX ORDER — PROPOFOL 10 MG/ML
VIAL (ML) INTRAVENOUS
Status: DISCONTINUED | OUTPATIENT
Start: 2023-01-24 | End: 2023-01-24

## 2023-01-24 RX ORDER — ROCURONIUM BROMIDE 10 MG/ML
INJECTION, SOLUTION INTRAVENOUS
Status: DISCONTINUED | OUTPATIENT
Start: 2023-01-24 | End: 2023-01-24

## 2023-01-24 RX ORDER — TOBRAMYCIN 40 MG/ML
INJECTION INTRAMUSCULAR; INTRAVENOUS
Status: DISCONTINUED | OUTPATIENT
Start: 2023-01-24 | End: 2023-01-24 | Stop reason: HOSPADM

## 2023-01-24 RX ORDER — FENTANYL CITRATE 50 UG/ML
INJECTION, SOLUTION INTRAMUSCULAR; INTRAVENOUS
Status: DISCONTINUED | OUTPATIENT
Start: 2023-01-24 | End: 2023-01-24

## 2023-01-24 RX ORDER — NALOXONE HCL 0.4 MG/ML
0.02 VIAL (ML) INJECTION
Status: DISCONTINUED | OUTPATIENT
Start: 2023-01-24 | End: 2023-01-24

## 2023-01-24 RX ORDER — METRONIDAZOLE 500 MG/100ML
500 INJECTION, SOLUTION INTRAVENOUS
Status: DISCONTINUED | OUTPATIENT
Start: 2023-01-24 | End: 2023-01-24

## 2023-01-24 RX ORDER — DEXAMETHASONE SODIUM PHOSPHATE 4 MG/ML
INJECTION, SOLUTION INTRA-ARTICULAR; INTRALESIONAL; INTRAMUSCULAR; INTRAVENOUS; SOFT TISSUE
Status: DISCONTINUED | OUTPATIENT
Start: 2023-01-24 | End: 2023-01-24

## 2023-01-24 RX ORDER — GENTAMICIN SULFATE 40 MG/ML
INJECTION, SOLUTION INTRAMUSCULAR; INTRAVENOUS
Status: DISCONTINUED | OUTPATIENT
Start: 2023-01-24 | End: 2023-01-24 | Stop reason: HOSPADM

## 2023-01-24 RX ORDER — VANCOMYCIN HYDROCHLORIDE 1 G/20ML
INJECTION, POWDER, LYOPHILIZED, FOR SOLUTION INTRAVENOUS
Status: DISCONTINUED | OUTPATIENT
Start: 2023-01-24 | End: 2023-01-24 | Stop reason: HOSPADM

## 2023-01-24 RX ORDER — METHOCARBAMOL 500 MG/1
1000 TABLET, FILM COATED ORAL 4 TIMES DAILY
Status: DISCONTINUED | OUTPATIENT
Start: 2023-01-24 | End: 2023-02-10

## 2023-01-24 RX ORDER — PROPOFOL 10 MG/ML
VIAL (ML) INTRAVENOUS CONTINUOUS PRN
Status: DISCONTINUED | OUTPATIENT
Start: 2023-01-24 | End: 2023-01-24

## 2023-01-24 RX ADMIN — DIAZEPAM 5 MG: 5 TABLET ORAL at 11:01

## 2023-01-24 RX ADMIN — Medication: at 06:01

## 2023-01-24 RX ADMIN — PROPOFOL 50 MG: 10 INJECTION, EMULSION INTRAVENOUS at 08:01

## 2023-01-24 RX ADMIN — MIDAZOLAM HYDROCHLORIDE 2 MG: 1 INJECTION, SOLUTION INTRAMUSCULAR; INTRAVENOUS at 07:01

## 2023-01-24 RX ADMIN — SODIUM CHLORIDE, SODIUM GLUCONATE, SODIUM ACETATE, POTASSIUM CHLORIDE, MAGNESIUM CHLORIDE, SODIUM PHOSPHATE, DIBASIC, AND POTASSIUM PHOSPHATE: .53; .5; .37; .037; .03; .012; .00082 INJECTION, SOLUTION INTRAVENOUS at 08:01

## 2023-01-24 RX ADMIN — FENTANYL CITRATE 100 MCG: 50 INJECTION, SOLUTION INTRAMUSCULAR; INTRAVENOUS at 07:01

## 2023-01-24 RX ADMIN — Medication: at 11:01

## 2023-01-24 RX ADMIN — METHOCARBAMOL 1000 MG: 500 TABLET ORAL at 04:01

## 2023-01-24 RX ADMIN — HYDROMORPHONE HYDROCHLORIDE 0.2 MG: 1 INJECTION, SOLUTION INTRAMUSCULAR; INTRAVENOUS; SUBCUTANEOUS at 02:01

## 2023-01-24 RX ADMIN — MEROPENEM 2 G: 1 INJECTION INTRAVENOUS at 11:01

## 2023-01-24 RX ADMIN — PROPOFOL 150 MCG/KG/MIN: 10 INJECTION, EMULSION INTRAVENOUS at 07:01

## 2023-01-24 RX ADMIN — ACETAMINOPHEN 1000 MG: 500 TABLET ORAL at 04:01

## 2023-01-24 RX ADMIN — METHOCARBAMOL 1000 MG: 500 TABLET ORAL at 08:01

## 2023-01-24 RX ADMIN — SODIUM CHLORIDE 1500 MG: 9 INJECTION, SOLUTION INTRAVENOUS at 07:01

## 2023-01-24 RX ADMIN — SODIUM CHLORIDE, SODIUM GLUCONATE, SODIUM ACETATE, POTASSIUM CHLORIDE, MAGNESIUM CHLORIDE, SODIUM PHOSPHATE, DIBASIC, AND POTASSIUM PHOSPHATE: .53; .5; .37; .037; .03; .012; .00082 INJECTION, SOLUTION INTRAVENOUS at 10:01

## 2023-01-24 RX ADMIN — PROPOFOL 50 MG: 10 INJECTION, EMULSION INTRAVENOUS at 07:01

## 2023-01-24 RX ADMIN — NICARDIPINE HYDROCHLORIDE 100 MCG: 25 INJECTION INTRAVENOUS at 09:01

## 2023-01-24 RX ADMIN — TRANEXAMIC ACID 1 MG/KG/HR: 100 INJECTION, SOLUTION INTRAVENOUS at 09:01

## 2023-01-24 RX ADMIN — ONDANSETRON 4 MG: 2 INJECTION INTRAMUSCULAR; INTRAVENOUS at 11:01

## 2023-01-24 RX ADMIN — REMIFENTANIL HYDROCHLORIDE 0.2 MCG/KG/MIN: 1 INJECTION, POWDER, LYOPHILIZED, FOR SOLUTION INTRAVENOUS at 07:01

## 2023-01-24 RX ADMIN — LACTULOSE 20 G: 20 SOLUTION ORAL at 04:01

## 2023-01-24 RX ADMIN — CEFTRIAXONE 2 G: 1 INJECTION, POWDER, FOR SOLUTION INTRAMUSCULAR; INTRAVENOUS at 08:01

## 2023-01-24 RX ADMIN — GABAPENTIN 600 MG: 300 CAPSULE ORAL at 08:01

## 2023-01-24 RX ADMIN — SODIUM CHLORIDE: 0.9 INJECTION, SOLUTION INTRAVENOUS at 07:01

## 2023-01-24 RX ADMIN — VANCOMYCIN HYDROCHLORIDE 1500 MG: 1.5 INJECTION, POWDER, LYOPHILIZED, FOR SOLUTION INTRAVENOUS at 08:01

## 2023-01-24 RX ADMIN — KETAMINE HYDROCHLORIDE 2 MCG/KG/MIN: 50 INJECTION INTRAMUSCULAR; INTRAVENOUS at 07:01

## 2023-01-24 RX ADMIN — PANTOPRAZOLE SODIUM 40 MG: 40 TABLET, DELAYED RELEASE ORAL at 04:01

## 2023-01-24 RX ADMIN — OXYCODONE HYDROCHLORIDE 10 MG: 10 TABLET ORAL at 12:01

## 2023-01-24 RX ADMIN — SODIUM CHLORIDE: 9 INJECTION, SOLUTION INTRAVENOUS at 01:01

## 2023-01-24 RX ADMIN — OXYCODONE HYDROCHLORIDE 10 MG: 10 TABLET ORAL at 04:01

## 2023-01-24 RX ADMIN — FENTANYL CITRATE 100 MCG: 50 INJECTION, SOLUTION INTRAMUSCULAR; INTRAVENOUS at 09:01

## 2023-01-24 RX ADMIN — PROPOFOL 200 MG: 10 INJECTION, EMULSION INTRAVENOUS at 07:01

## 2023-01-24 RX ADMIN — GABAPENTIN 600 MG: 300 CAPSULE ORAL at 06:01

## 2023-01-24 RX ADMIN — LORAZEPAM 0.5 MG: 0.5 TABLET ORAL at 12:01

## 2023-01-24 RX ADMIN — ROCURONIUM BROMIDE 10 MG: 10 INJECTION INTRAVENOUS at 07:01

## 2023-01-24 RX ADMIN — ACETAMINOPHEN 650 MG: 325 TABLET ORAL at 05:01

## 2023-01-24 RX ADMIN — Medication: at 01:01

## 2023-01-24 RX ADMIN — TRANEXAMIC ACID 1000 MG: 100 INJECTION, SOLUTION INTRAVENOUS at 09:01

## 2023-01-24 RX ADMIN — Medication: at 08:01

## 2023-01-24 RX ADMIN — NICARDIPINE HYDROCHLORIDE 50 MCG: 25 INJECTION INTRAVENOUS at 09:01

## 2023-01-24 RX ADMIN — HYDROMORPHONE HYDROCHLORIDE 0.5 MG: 1 INJECTION, SOLUTION INTRAMUSCULAR; INTRAVENOUS; SUBCUTANEOUS at 12:01

## 2023-01-24 RX ADMIN — DEXAMETHASONE SODIUM PHOSPHATE 12 MG: 4 INJECTION, SOLUTION INTRAMUSCULAR; INTRAVENOUS at 07:01

## 2023-01-24 RX ADMIN — LIDOCAINE HYDROCHLORIDE 100 MG: 20 INJECTION, SOLUTION EPIDURAL; INFILTRATION; INTRACAUDAL; PERINEURAL at 07:01

## 2023-01-24 RX ADMIN — ACETAMINOPHEN 1000 MG: 500 TABLET ORAL at 08:01

## 2023-01-24 RX ADMIN — METHOCARBAMOL 1000 MG: 500 TABLET ORAL at 05:01

## 2023-01-24 RX ADMIN — SODIUM CHLORIDE 0.3 MCG/KG/MIN: 9 INJECTION, SOLUTION INTRAVENOUS at 08:01

## 2023-01-24 RX ADMIN — SUCCINYLCHOLINE CHLORIDE 100 MG: 20 INJECTION, SOLUTION INTRAMUSCULAR; INTRAVENOUS at 07:01

## 2023-01-24 RX ADMIN — BUPROPION HYDROCHLORIDE 300 MG: 300 TABLET, FILM COATED, EXTENDED RELEASE ORAL at 04:01

## 2023-01-24 RX ADMIN — MEROPENEM 2 G: 1 INJECTION INTRAVENOUS at 05:01

## 2023-01-24 RX ADMIN — DEXMEDETOMIDINE HYDROCHLORIDE 12 MCG: 100 INJECTION, SOLUTION INTRAVENOUS at 11:01

## 2023-01-24 NOTE — PLAN OF CARE
"Roldan Ca - Neurosurgery ([facility])  Infectious Disease  Plan of Care Note     Patient Name: Rachel Zazueta  MRN: [id number]  Admission Date: 1/19/2023  Length of Stay: 3 days  Attending Physician: Oliver Méndez MD  Primary Care Provider: Dannielle Merino DO     Isolation Status: Contact  Assessment/Plan:     Thoracic discitis     42-year-old woman known to ID with hx of IVDU, chronic HCV with cirrhosis, obesity, neurogenic bladder, chronic pancytopenia, s/p spinal fusion (04/2021), lumbar epidural abscess (+ GBS) with removal of hardware (02/2022), and T10-pelvis spinal fusion with hardware (03/2022), s/p 6 weeks IV abx followed by chronic amoxicillin suppression, recent history of pyogenic arthritis of right shoulder s/p  I&D on 11/15/22 with cx positive for E Coli ( incomplete treatment, though patient reports she completed course Bactrim), who was admitted to [facility] [place]  12/23/22 with back pain and increasing weakness in the lower extremities,  ESBL E.Coli bacteremia (blood cx cleared 12/28, tx with meropenem approx 14 days).  During that admission she developed progressive LE weakness, and was transferred to [facility] for advanced imaging and Neurosurgery evaluation. See HPI for more detailed hx.       On arrival at [facility], MRI C/T/L (01/19/23): c/f worsening T9-T10 compression fractures and kyphosis, "haloing" of T10 and pelvic screws, extensive edema in the paraspinal muscles, possible sacroilitis.  CT with spondylodiscitis T8-9, T9-10.   -- On MRI, C-spine notable for marrow edema signal of the C5-C7 vertebral bodies and enhancing C6 inferior endplate Schmorl's node, favored to relate to degenerative change, but unable to r/o early vertebral spondylo-discitis. Also, there is small focus of enhancement and edema at the tip of the C7 spinous process    Neurosurgery planning hardware removal and washout on 1/24.  --No signs of persistent infection in left shoulder on exam.       Blood cultures (01/20): " NGTD.    Pt remains afebrile, no leukocytosis, but leukopenia (1.8) w/ chronic pancytopenia.  ESR 70, CRP 9.4.  Urine cx with multiple organisms none in predominance (no urinary symptoms).    Patient on oral amoxicillin 1G Q12h -- as chronic suppressive therapy.      Plan/recommendations:  1.  Continue PO-Amoxicillin for now until surgery, as long pt remains stable / afebrile.   2.  If clinically deteriorates, recommend broadening to empiric IV abx, consider meropenem 2 g  IV q 8 hours.  3.  Recommend / Request NSGY to please obtain at-least two surgical biopsy specimens of disc/tissue from areas of concern for discitis (C6-7, T8-9, T9-10?) -- to send for pathology, and culture to include gram stain, aerobic, anaerobic, fungal, and AFB.    4.  Will follow blood cxs (01/20) NGTD; and for intra-op cxs / path and OP report.  5. Tentative plans to broaden antibiotic coverage s/p surgery -- further recs to follow.      -- Discussed with ID staff and primary team   -- ID will continue to follow w/ further recs.

## 2023-01-24 NOTE — HPI
42-year-old female with a history of IV drug use, chronic hep C with cirrhosis, spinal fusion in April 2021, complicated by epidural abscess with removal of hardware February 2022 and against spinal fusion in March 2022 of T10 through the pelvis as well as neurogenic bladder who initially presented to [facility] in late December for generalized weakness, fatigue and bilateral flank pain.  Her course at [facility] was complicated by a E coli bacteremia thought to be due to a urinary source, she is completed a 7 day course of meropenem on 1/3, and had bcx from 1/2 that did not grow any bacteria.       She also had an ICU admission for ongoing back spasms and agitation requiring Precedex.  In early January patient started developing progressive lower extremity weakness greater in the left compared to the right.  She also had an MRI at the outside hospital that showed some white matter changes concerning for possible demyelinating disease.  However her thoracic spine radiographs also showed worsening of her kyphosis at T9-T10.  Due to the concern for either a demyelinating polyneuropathy, MS, or spinal cord compression the patient was transferred to [facility] for neurosurgical evaluation.       On admission the patient had a CT lumbar and thoracic spine and an MRI CTL spine. The MRI showed  focal kyphosis T8 through T10, with possible associated cord edema signal.  As well as extensive edema throughout the posterior paraspinal musculature concerning for possible infectious or inflammatory process.  There was concern that the findings at T8 through T10 could correspond with compressive myelopathy.  The CT lumbar spine and thoracic spine showed spondylodiscitis at T8 through T10, as well as pathologic compression fractures at T9 and T10.  There were also erosive changes involving the sacroiliac joints bilaterally which was concerning for sacroiliitis and could be infectious.     Patient underwent  laminectomy T8-T10; posterior fusion T8-T12; and washout by NSGY today. No complications noted during surgery. Patient is HDS and on 3L NC. Patient is currently on a PCA dilaudid pump.

## 2023-01-24 NOTE — TRANSFER OF CARE
"Anesthesia Transfer of Care Note    Patient: Rachel Zazueta    Procedure(s) Performed: Procedure(s) (LRB):  **AIRO** T8-T12 POSTERIOR FUSION, T8-T10 LAMINECTOMY, HARDWARE REMOVAL AND WASHOUT (N/A)    Patient location: PACU    Anesthesia Type: general    Transport from OR: Transported from OR on 6-10 L/min O2 by face mask with adequate spontaneous ventilation    Post pain: adequate analgesia    Post assessment: no apparent anesthetic complications and tolerated procedure well    Post vital signs: stable    Level of consciousness: awake and responds to stimulation    Nausea/Vomiting: no nausea/vomiting    Complications: none    Transfer of care protocol was followed      Last vitals:   Visit Vitals  BP (!) 116/52 (BP Location: Left arm, Patient Position: Lying)   Pulse 103   Temp 36.9 °C (98.4 °F) (Oral)   Resp 18   Ht 5' 7" (1.702 m)   Wt 104 kg (229 lb 4.5 oz)   LMP 01/23/2023   SpO2 95%   Breastfeeding No   BMI 35.91 kg/m²     "

## 2023-01-24 NOTE — ASSESSMENT & PLAN NOTE
CT T/L spine thin cut 1/20 with haloing of pelvic and T10 screws, spondylodiscitis T8-9, T9-10  - CT C spine with concern for discitis at C6-7  Patient is currently afebrile with chronic pancytopenia  Previously on Amoxicillin up until surgery for chronic suppressive therapy.    -ID following  -Blood cx (1/20) NGTD  -Pending intra-operative cultures   -Continue Vancomycin and meropenem  -Daily CBC

## 2023-01-24 NOTE — ASSESSMENT & PLAN NOTE
43 yo F w/ history of multiple spinal surgeries presented to OSH with possible infection of patient's shoulder and found to have UTI and E. Coli bacteremia and progressively worse B/L LE weakness. Transferred to [facility] for neurosurgical evaluation. Underwent Laminectomy T8-T10; Posterior spinal fusion T8-T12; hardware removal and washout. Admitted to [facility] for HLOC. Arrived to unit on PCA dilaudid pump. Patient is HDS on 3L NC.     -Admit to NCC for close monitoring in post op setting  -Neuro checks q1hr  -Vital signs q1hr  -NSGY following  -MAP goals >65  -SBP goals <180  -PRN labetalol, hydralazine  -Patient will need to lie flat with HOB 15-20  -Started on Vancomycin and meropenem  -Can start regular diet if passes Miller  -MM pain regimen; PCA Dilaudid pump for pain management  -Will start VTE ppx 24 hours post op  -TSH, Lipid panel, and A1C pending  -PT/INR, aPTT pending  -CBC, CMP, Mag, Phos daily   -PT/OT

## 2023-01-24 NOTE — PLAN OF CARE
Patient not stable for discharge at this time.  SW will continue to follow patient's progress to discharge. SW remains available for any family concerns or needs. SW awaits PT/OT recommendations.       Julissa Oswald LMSW  PRN - [facility]  [phone]

## 2023-01-24 NOTE — PROGRESS NOTES
Patient transferred to [place] [id number] via hospital bed, accompanied by significant other. Patient states pain 8/10 to back, refuses any repositioning, warm blankets. VSS. Neutropenic precautions maintained.Safety checklist completed. Patient ready for surgery.

## 2023-01-24 NOTE — NURSING TRANSFER
Nursing Transfer Note      1/24/2023     Reason patient is being transferred: Out of PACU    Transfer To: [id number]    Transfer via bed    Transfer with cardiac monitoring    Transported by Felicitas, RN & Nicole, RN    Medicines sent: PCA Dilaudid & IV NaCl     Any special needs or follow-up needed: No    Chart send with patient: Yes    Notified: Significant Other, Minor    Patient reassessed at: 1420     Upon arrival to floor: cardiac monitor applied, patient oriented to room, call bell in reach, and bed in lowest position    Patient transferred to ICU bed & connected to ICU tele monitor. VSS.

## 2023-01-24 NOTE — ASSESSMENT & PLAN NOTE
"  42-year-old woman known to ID with hx of IVDU, chronic HCV with cirrhosis, obesity, neurogenic bladder, chronic pancytopenia, s/p L-spine fusion (04/2021), lumbar epidural abscess (+ GBS) with removal of hardware (02/2022), and T10-pelvis spinal fusion with hardware (03/2022), s/p 6 weeks IV abx followed by chronic amoxicillin suppression, recent history of pyogenic arthritis of right shoulder s/p  I&D on 11/15/22 with cx positive for E Coli ( incomplete treatment, though patient reports she completed course Bactrim), who was admitted to [facility]  12/23/22 with back pain and increasing weakness in the lower extremities,  ESBL E.Coli bacteremia (blood cx cleared 12/28, tx with meropenem approx 14 days).  During that admission she developed progressive LE weakness, and was transferred to [facility] for advanced imaging and Neurosurgery evaluation. See HPI for more detailed hx.       On arrival at [facility], MRI C/T/L (01/19/23): c/f worsening T9-T10 compression fractures and kyphosis, "haloing" of T10 and pelvic screws, extensive edema in the paraspinal muscles, possible sacroilitis. Limited exam of lumbar spine. CT with spondylodiscitis T8-9, T9-10.   -- On MRI, C-spine notable for marrow edema signal of the C5-C7 vertebral bodies and enhancing C6 inferior endplate Schmorl's node, favored to relate to degenerative change, but unable to r/o early vertebral spondylo-discitis. Also, there is small focus of enhancement and edema at the tip of the C7 spinous process  --No signs of persistent infection in left shoulder on exam.     Patient underwent planned NSGY (01/24); Revision/extension fusion w/ washout debridement of florid infection, Removal of previous F11-Eqaysn instrumentation, T6-12 temporary fixation w/ pedicle screws & Rods, T8-10 laminectomy, primary closure w/ wound vac placement.   --Multiple tissue / bone cxs obtained during surgery, sent for pathology, and culture -- results pending.    --Blood cultures " (01/20): NGTD.    Pt remains afebrile, no leukocytosis, but marked elevation to 10.3 (up from 1.8). ESR 70, CRP 9.4.  Urine cx with multiple organisms none in predominance (no urinary symptoms).    Patient on oral amoxicillin 1G Q12h -- as chronic suppressive therapy.      Plan/recommendations:  1. Recommend discontinuing PO-Amoxicillin.  2. To start IV-Meropenem 2g q8h (or 6g q24h continuous infusion)  3. Anticipate duration of at-least 6wks s/p last surgical date.  4. Will follow blood cxs (01/20) NGTD; and for intra-op cxs / path -- to tailor abx accordingly.    -- Discussed with ID staff and primary team   -- ID will continue to follow w/ further recs.

## 2023-01-24 NOTE — NURSING
Patient arrived to [facility] from PACU by RN and PCT    Report received from: PACU RNFelicitas    Type of stroke/diagnosis:  T8-T12 POSTERIOR FUSION, T8-T10 LAMINECTOMY, HARDWARE REMOVAL AND WASHOUT     Current symptoms: drowsy from anesthesia, pupils 3 & brisk, unable to respond to questions    Skin assessment done: Yes  Wounds noted: None noted    Miller Completed? No    Patient Belongings on Admit: none noted    NCC notified: Sagar Lamas, DO

## 2023-01-24 NOTE — PLAN OF CARE
[facility] - Neuro Critical Care  Discharge Reassessment    Primary Care Provider: Dannielle Merino DO    Expected Discharge Date: 1/31/2023    [facility]   Procedure(s) (LRB):  **AIRO** T8-T12 POSTERIOR FUSION, T8-T10 LAMINECTOMY, HARDWARE REMOVAL AND WASHOUT (N/A)     Patient not medically ready for discharge.       Reassessment (most recent)       Discharge Reassessment - 01/24/23 3317          Discharge Reassessment    Assessment Type Discharge Planning Reassessment     Did the patient's condition or plan change since previous assessment? Yes     Communicated SHIRA with patient/caregiver Date not available/Unable to determine     Discharge Plan A Rehab     Discharge Plan B Home     DME Needed Upon Discharge  other (see comments)   tbd    Discharge Barriers Identified None     Why the patient remains in the hospital Requires continued medical care                     Ernestina Elizabeth RN, CCRN-K, [facility]  Neuro-Critical Care   [phone]

## 2023-01-24 NOTE — ANESTHESIA PROCEDURE NOTES
Intubation    Date/Time: 1/24/2023 7:31 AM  Performed by: Radha Pope CRNA  Authorized by: Osmar Oh MD     Intubation:     Induction:  Intravenous    Intubated:  Postinduction    Mask Ventilation:  Easy mask    Attempts:  1    Attempted By:  Student    Method of Intubation:  Video laryngoscopy    Blade:  Su 3    Laryngeal View Grade: Grade I - full view of cords      Difficult Airway Encountered?: No      Complications:  None    Airway Device:  Oral endotracheal tube    Airway Device Size:  7.0    Style/Cuff Inflation:  Cuffed (inflated to minimal occlusive pressure)    Tube secured:  21    Secured at:  The lips    Placement Verified By:  Capnometry    Complicating Factors:  None    Findings Post-Intubation:  BS equal bilateral and atraumatic/condition of teeth unchanged

## 2023-01-24 NOTE — PT/OT/SLP DISCHARGE
Occupational Therapy Discharge Summary    Rachel Zazueta  MRN: [id number]   Principal Problem: Weakness of both lower extremities      Patient Discharged from acute Occupational Therapy on 1/24/2023.  Please refer to prior OT note dated 1/21/2023 for functional status.    Assessment:      Patient has not met goals.    Objective:     GOALS:   Multidisciplinary Problems       Occupational Therapy Goals          Problem: Occupational Therapy    Goal Priority Disciplines Outcome Interventions   Occupational Therapy Goal     OT, PT/OT Ongoing, Progressing    Description: Goals to be met by: 2/10     Patient will increase functional independence with ADLs by performing:    UE Dressing with Moderate Assistance.  Grooming while seated with Minimal Assistance.  Sitting at edge of bed x15 minutes with Minimal Assistance.  Rolling to Bilateral with Minimal Assistance.   Supine to sit with Moderate Assistance.                         Reasons for Discontinuation of Therapy Services  Pt in OT      Plan:     Patient Discharged to: pt in OR for surgery.  Will need new OT orders post surgery if/when appropriate for therapy to resume.    1/24/2023

## 2023-01-24 NOTE — PROGRESS NOTES
Pharmacokinetic Initial Assessment: IV Vancomycin    Assessment/Plan:    - Pt currently on vancomycin 1500 mg Q12H, received first dose today at 07:45, no loading dose given  - Goal trough 15-20 mcg/mL   - Continue current regimen for now, obtain trough level tomorrow 1/25 at 07:00 to guide further dose increase    Pharmacy will continue to follow and monitor vancomycin.      Please contact pharmacy at extension [phone] with any questions regarding this assessment.     Thank you for the consult,   Lisa Waldron, PharmD, [facility]  Neurocritical Care Pharmacist  [phone]         Patient brief summary:  Rachel Zazueta is a 42 y.o. female initiated on antimicrobial therapy with IV Vancomycin for treatment of suspected bone/joint infection    Drug Allergies:   Review of patient's allergies indicates:   Allergen Reactions    Bee venom protein (honey bee) Anaphylaxis     Patient reports she is allergic to bee stings.    Naproxen Anaphylaxis     Throat closing    12-23- Patient reports taking Ibuprofen 200 mg at home without problems. Verified X3. [place]    Wasp sting [allergen ext-venom-honey bee] Anaphylaxis    Adhesive Blisters    Iodine and iodide containing products Hives     Allergic to iodine in seafood only    Shellfish containing products     Nuts [tree nut] Rash       Actual Body Weight:   104 kg    Renal Function:   Estimated Creatinine Clearance: 151.6 mL/min (based on SCr of 0.6 mg/dL).,     Dialysis Method (if applicable):  N/A    CBC (last 72 hours):  Recent Labs   Lab Result Units 01/22/23  0427 01/23/23  0328 01/24/23  0401 01/24/23  1304   WBC K/uL 2.35* 1.82* 2.35* 10.34   Hemoglobin g/dL 8.8* 8.9* 9.5* 7.9*   Hematocrit % 29.4* 30.7* 31.2* 26.2*   Platelets K/uL 83* 88* 104* 190   Gran % % 65.9 71.0 68.5 94.6*   Lymph % % 19.6 20.0 16.2* 3.6*   Mono % % 10.2 7.0 9.4 0.8*   Eosinophil % % 3.0 1.0 5.1 0.1   Basophil % % 0.9 0.0 0.4 0.1   Differential Method  Automated Manual Automated Automated       Metabolic  Panel (last 72 hours):  Recent Labs   Lab Result Units 01/22/23  0427 01/24/23  0401 01/24/23  1304   Sodium mmol/L 137 133* 135*   Potassium mmol/L 4.0 4.1 4.7   Chloride mmol/L 107 102 108   CO2 mmol/L 24 28 24   Glucose mg/dL 66* 81 159*   BUN mg/dL 14 13 14   Creatinine mg/dL 0.5 0.5 0.6   Albumin g/dL 2.1* 2.2*  --    Total Bilirubin mg/dL 1.8* 1.4*  --    Alkaline Phosphatase U/L 117 123  --    AST U/L 39 43*  --    ALT U/L 20 26  --    Magnesium mg/dL 1.6 1.7  --    Phosphorus mg/dL 3.8 3.9  --        Drug levels (last 3 results):  No results for input(s): VANCOMYCINRA, VANCORANDOM, VANCOMYCINPE, VANCOPEAK, VANCOMYCINTR, VANCOTROUGH in the last 72 hours.    Microbiologic Results:  Microbiology Results (last 7 days)       Procedure Component Value Units Date/Time    Blood culture [[id number]] Collected: 01/20/23 1131    Order Status: Completed Specimen: Blood Updated: 01/24/23 1412     Blood Culture, Routine No Growth to date      No Growth to date      No Growth to date      No Growth to date      No Growth to date    Narrative:      Collection has been rescheduled by [facility] at 01/20/2023 11:03 Reason:   Patient unavailable in room with doctors   Collection has been rescheduled by [facility] at 01/20/2023 11:03 Reason:   Patient unavailable in room with doctors     Blood culture [[id number]] Collected: 01/20/23 1131    Order Status: Completed Specimen: Blood Updated: 01/24/23 1412     Blood Culture, Routine No Growth to date      No Growth to date      No Growth to date      No Growth to date      No Growth to date    Narrative:      Collection has been rescheduled by 2 at 01/20/2023 11:03 Reason:   Patient unavailable in room with doctors   Collection has been rescheduled by [facility] at 01/20/2023 11:03 Reason:   Patient unavailable in room with doctors     Gram stain [[id number]] Collected: 01/24/23 0913    Order Status: Completed Specimen: Body Fluid from Back Updated: 01/24/23 1239     Gram Stain Result No WBC's      No  organisms seen    Narrative:      1) Perispinal    Gram stain [[id number]] Collected: 01/24/23 0944    Order Status: Completed Specimen: Bone from Back Updated: 01/24/23 1238     Gram Stain Result Rare WBC's      No organisms seen    Narrative:      3) Perispinal    Gram stain [[id number]] Collected: 01/24/23 0913    Order Status: Completed Specimen: Body Fluid from Back Updated: 01/24/23 1237     Gram Stain Result Rare WBC's      No organisms seen    Narrative:      2) Perispinal    Gram stain [[id number]] Collected: 01/24/23 0943    Order Status: Completed Specimen: Bone from Back Updated: 01/24/23 1236     Gram Stain Result No WBC's      No organisms seen    Narrative:      4) Perispinal    Aerobic culture [[id number]] Collected: 01/24/23 0914    Order Status: Sent Specimen: Wound from Back Updated: 01/24/23 1016    Culture, Anaerobe [[id number]] Collected: 01/24/23 0943    Order Status: Sent Specimen: Bone from Back Updated: 01/24/23 1009    Fungus culture [[id number]] Collected: 01/24/23 0943    Order Status: Sent Specimen: Bone from Back Updated: 01/24/23 1009    Aerobic culture [[id number]] Collected: 01/24/23 0943    Order Status: Sent Specimen: Bone from Back Updated: 01/24/23 1008    AFB Culture & Smear [[id number]] Collected: 01/24/23 0943    Order Status: Sent Specimen: Bone from Back Updated: 01/24/23 1008    Aerobic culture [[id number]] Collected: 01/24/23 0943    Order Status: Sent Specimen: Bone from Back Updated: 01/24/23 1007    AFB Culture & Smear [[id number]] Collected: 01/24/23 0943    Order Status: Sent Specimen: Bone from Back Updated: 01/24/23 1007    Fungus culture [[id number]] Collected: 01/24/23 0943    Order Status: Sent Specimen: Bone from Back Updated: 01/24/23 1007    Culture, Anaerobe [[id number]] Collected: 01/24/23 0943    Order Status: Sent Specimen: Bone from Back Updated: 01/24/23 1007    Aerobic culture [[id number]] Collected: 01/24/23 0914    Order Status: Sent Specimen: Wound from Back  Updated: 01/24/23 1005    Fungus culture [[id number]] Collected: 01/24/23 0913    Order Status: Sent Specimen: Body Fluid from Back Updated: 01/24/23 0929    AFB Culture & Smear [[id number]] Collected: 01/24/23 0913    Order Status: Sent Specimen: Body Fluid from Back Updated: 01/24/23 0929    Culture, Anaerobe [[id number]] Collected: 01/24/23 0913    Order Status: Sent Specimen: Body Fluid from Back Updated: 01/24/23 0928    Culture, Anaerobe [[id number]] Collected: 01/24/23 0913    Order Status: Sent Specimen: Body Fluid from Back Updated: 01/24/23 0927    Fungus culture [[id number]] Collected: 01/24/23 0913    Order Status: Sent Specimen: Body Fluid from Back Updated: 01/24/23 0927    AFB Culture & Smear [[id number]] Collected: 01/24/23 0913    Order Status: Sent Specimen: Body Fluid from Back Updated: 01/24/23 0926    Urine culture [[id number]] Collected: 01/20/23 0130    Order Status: Completed Specimen: Urine Updated: 01/21/23 1144     Urine Culture, Routine Multiple organisms isolated. None in predominance.  Repeat if      clinically necessary.    Narrative:      Specimen Source->Urine    Blood culture [[id number]]     Order Status: Canceled Specimen: Blood     Culture, Respiratory with Gram Stain [[id number]]     Order Status: Canceled Specimen: Respiratory

## 2023-01-24 NOTE — PT/OT/SLP DISCHARGE
Physical Therapy Discharge Summary    Name: Racehl Zazueta  MRN: [id number]   Principal Problem: Weakness of both lower extremities     Patient Discharged from acute Physical Therapy on 23.  Please refer to prior PT noted date on 23 for functional status.     Assessment:     Pt to OR on , will require new orders post op    Objective:     GOALS:   Multidisciplinary Problems       Physical Therapy Goals          Problem: Physical Therapy    Goal Priority Disciplines Outcome Goal Variances Interventions   Physical Therapy Goal     PT, PT/OT Ongoing, Progressing     Description: Goals to be met by: 23     Patient will increase functional independence with mobility by performin. Supine to sit with Moderate Assistance  2. Sit to supine with Moderate Assistance  3. Rolling to Left and Right with Moderate Assistance.  4. Sit to stand transfer with Maximum Assistance                         Reasons for Discontinuation of Therapy Services  Pt to OR, will require new orders       Plan:     Patient Discharged to:  OR .      2023

## 2023-01-24 NOTE — BRIEF OP NOTE
[facility] - Surgery ([facility])  Brief Operative Note    SUMMARY     Surgery Date: 1/24/2023     Surgeon(s) and Role:     * Wally Rosado MD - Primary     * Mihai Canada MD - Resident - Assisting     * Emily Hercules MD - Resident - Assisting     * Mario Alberto Camilo MD - Co-Surgeon        Pre-op Diagnosis:  Sepsis, due to unspecified organism, unspecified whether acute organ dysfunction present [A41.9]  S/P spinal fusion [Z98.1]  Weakness of both lower extremities [R29.898]    Post-op Diagnosis:  Post-Op Diagnosis Codes:     * Sepsis, due to unspecified organism, unspecified whether acute organ dysfunction present [A41.9]     * S/P spinal fusion [Z98.1]     * Weakness of both lower extremities [R29.898]    Procedure(s) (LRB):  **AIRO** T8-T12 POSTERIOR FUSION, T8-T10 LAMINECTOMY, HARDWARE REMOVAL AND WASHOUT (N/A)    Anesthesia: General    Operative Findings: Revision/extension fusion w/ washout debridement of florid infection, Removal of previous U05-Fjmtiz instrumentation, T6-12 temporary fixation w/ pedicle screws & Rods, T8-10 laminectomy, primary closure w/ wound vac placement    Estimated Blood Loss: 1000 mL    Estimated Blood Loss has been documented.         Specimens:   Specimen (24h ago, onward)       Start     Ordered    01/24/23 1150  Specimen to Pathology, Surgery Neurosurgery  Once        Comments: Pre-op Diagnosis: Sepsis, due to unspecified organism, unspecified whether acute organ dysfunction present [A41.9]S/P spinal fusion [Z98.1]Weakness of both lower extremities [R29.898]Procedure(s):**AIRO** T8-T12 POSTERIOR FUSION, T8-T10 LAMINECTOMY, HARDWARE REMOVAL AND WASHOUT Number of specimens: 1Name of specimens: 1) Hardware- Gross     References:    Click here for ordering Quick Tip   Question Answer Comment   Procedure Type: Neurosurgery    Specimen Class: Routine/Screening    Which provider would you like to cc? WALLY ROSADO    Release to patient Immediate        01/24/23 2625                     [id number]

## 2023-01-24 NOTE — ASSESSMENT & PLAN NOTE
Continue Buproprion for depression  Continue Lorazepam for anxiety  Continue Gabapentin for neuropathy and anxiety

## 2023-01-24 NOTE — SUBJECTIVE & OBJECTIVE
Past Medical History:   Diagnosis Date    Anemia     Anxiety     Depressed     Diskitis     Hep C w/o coma, chronic     IV drug abuse     Kidney stone     Liver cirrhosis      Past Surgical History:   Procedure Laterality Date    ARTHROTOMY OF SHOULDER Left 11/15/2022    Procedure: ARTHROTOMY, SHOULDER;  Surgeon: Bjorn Hayes MD;  Location: [facility];  Service: Orthopedics;  Laterality: Left;  Left acromioclavicular joint arthrotomy    BACK SURGERY      benine tumor removal      forehead, age 9    CHOLECYSTECTOMY      KIDNEY SURGERY      LUMBAR FUSION Bilateral 3/2/2022    Procedure: FUSION, SPINE, LUMBAR;  Surgeon: Guille Templeton MD;  Location: [address];  Service: Neurosurgery;  Laterality: Bilateral;  AIRO  T10--Pelvis    REMOVAL OF HARDWARE FROM SPINE N/A 2/21/2022    Procedure: REMOVAL, HARDWARE, SPINE;  Surgeon: Guille Templeton MD;  Location: [address];  Service: Neurosurgery;  Laterality: N/A;  [place]    SPINE SURGERY        No current facility-administered medications on file prior to encounter.     Current Outpatient Medications on File Prior to Encounter   Medication Sig Dispense Refill    albuterol (ACCUNEB) 1.25 mg/3 mL Nebu Take 3 mLs (1.25 mg total) by nebulization every 6 (six) hours as needed (shortness of breath). Rescue 75 mL 0    buPROPion (WELLBUTRIN) 100 MG tablet Take 1 tablet (100 mg total) by mouth 2 (two) times daily. 60 tablet 2    folic acid (FOLVITE) 1 MG tablet Take 1 tablet (1 mg total) by mouth once daily. (Patient not taking: Reported on 11/25/2022) 42 tablet 0    gabapentin (NEURONTIN) 300 MG capsule Take 1 capsule (300 mg total) by mouth 3 (three) times daily. 90 capsule 11    lactulose (CHRONULAC) 20 gram/30 mL Soln Take 30 mLs (20 g total) by mouth 3 (three) times daily. 2700 mL 2    pantoprazole (PROTONIX) 40 MG tablet Take 1 tablet (40 mg total) by mouth once daily. 30 tablet 1    [DISCONTINUED] diclofenac sodium (VOLTAREN) 1 % Gel Apply 2 g topically 4 (four) times  daily. 50 g 0      Allergies: Bee venom protein (honey bee), Naproxen, Wasp sting [allergen ext-venom-honey bee], Adhesive, Iodine and iodide containing products, Shellfish containing products, and Nuts [tree nut]    Family History   Problem Relation Age of Onset    Hepatitis Mother     Drug abuse Mother     Liver cancer Mother     Drug abuse Father     Cirrhosis Father     Hepatitis Father      Social History     Tobacco Use    Smoking status: Some Days     Packs/day: 0.50     Years: 23.00     Pack years: 11.50     Types: Cigarettes    Smokeless tobacco: Never    Tobacco comments:     patient states she knows she nees to quit.   Substance Use Topics    Alcohol use: Not Currently     Comment: quit 2014    Drug use: Yes     Frequency: 4.0 times per week     Types: Marijuana     Comment: Patient denies needle use, only inhalation of methampehtamines or orally.  Last known drug use was prior to admission to hospital stay for surgery     Review of Systems   Constitutional:  Negative for fever.   HENT:  Negative for congestion and rhinorrhea.    Eyes:  Negative for visual disturbance.   Respiratory:  Negative for shortness of breath.    Cardiovascular:  Negative for chest pain.   Gastrointestinal:  Negative for abdominal distention, abdominal pain, nausea and vomiting.   Genitourinary:  Negative for dysuria and hematuria.   Musculoskeletal:  Positive for back pain. Negative for neck pain.   Neurological:  Positive for weakness. Negative for dizziness, seizures, light-headedness, numbness and headaches.   Objective:     Vitals:    Temp: 96.4 °F (35.8 °C)  Pulse: 95  Rhythm: normal sinus rhythm  BP: 118/60  MAP (mmHg): 85  Resp: 13  SpO2: 100 %    Temp  Min: 96.4 °F (35.8 °C)  Max: 98.6 °F (37 °C)  Pulse  Min: 80  Max: 103  BP  Min: 72/39  Max: 126/65  MAP (mmHg)  Min: 50  Max: 90  Resp  Min: 10  Max: 35  SpO2  Min: 95 %  Max: 100 %    01/23 0701 - 01/24 0700  In: 800 [P.O.:800]  Out: 900 [Urine:900]   Unmeasured  Output  Urine Occurrence: 1  Stool Occurrence: 1       Physical Exam  Vitals and nursing note reviewed.   Constitutional:       General: She is not in acute distress.     Appearance: She is obese. She is not ill-appearing, toxic-appearing or diaphoretic.   HENT:      Head: Normocephalic.      Mouth/Throat:      Mouth: Mucous membranes are moist.   Eyes:      General: No scleral icterus.        Right eye: No discharge.         Left eye: No discharge.   Cardiovascular:      Rate and Rhythm: Normal rate and regular rhythm.      Heart sounds: Murmur heard.   Pulmonary:      Effort: Pulmonary effort is normal. No respiratory distress.      Breath sounds: Normal breath sounds.   Abdominal:      General: Bowel sounds are normal.      Palpations: Abdomen is soft.      Tenderness: There is no abdominal tenderness.   Musculoskeletal:      Cervical back: No rigidity.      Right lower leg: No edema (Mild).      Left lower leg: No edema (Mild).   Skin:     General: Skin is warm and dry.      Coloration: Skin is not jaundiced.   Neurological:      Mental Status: She is alert and oriented to person, place, and time. Mental status is at baseline.      Motor: Weakness (Bilateral LE) and tremor (Bilateral UE) present.      Comments: E4 V5 M6    Awake, alert, and oriented to self, time, place, and situation. Patient is answering all questions appropriately and following all commands briskly.   No facial droop. EOMI. PERRL. Peripheral vision intact bilaterally.     Motor:   FOWLER spontaneously and follows commands  RUE: 3/5  LUE: 3/5  RLE: 1/5  LLE: 1/5    Sensory:  Sensation grossly intact    Exam limited due to post operative anesthesia   Psychiatric:         Mood and Affect: Mood normal.         Behavior: Behavior normal.         Today I personally reviewed pertinent medications, imaging, laboratory results, microbiology results, notably:

## 2023-01-24 NOTE — PROGRESS NOTES
No UPT performed prior to arrival to [facility]. Patient denies the need to void at present. IVF open, pure wick in use to catch any urine. Dr. Oh waves the need for Blood pregnancy test.

## 2023-01-24 NOTE — ASSESSMENT & PLAN NOTE
Patient has reportedly refuse transplant evaluation in the past.     -Daily CMP and trend LFTs  -Continue Lactulose 20g TID   -Will need GI/hepatology follow up outpatient    -MELD-Na score: 11 at 1/24/2023  1:04 PM  MELD score: 11 at 1/24/2023  1:04 PM  Calculated from:  Serum Creatinine: 0.6 mg/dL (Using min of 1 mg/dL) at 1/24/2023  1:04 PM  Serum Sodium: 135 mmol/L at 1/24/2023  1:04 PM  Total Bilirubin: 1.4 mg/dL at 1/24/2023  4:01 AM  INR(ratio): 1.3 at 1/23/2023  3:53 PM  Age: 42 years

## 2023-01-25 LAB
ALBUMIN SERPL BCP-MCNC: 1.9 G/DL (ref 3.5–5.2)
ALP SERPL-CCNC: 92 U/L (ref 55–135)
ALT SERPL W/O P-5'-P-CCNC: 24 U/L (ref 10–44)
ANION GAP SERPL CALC-SCNC: 4 MMOL/L (ref 8–16)
APTT BLDCRRT: 25 SEC (ref 21–32)
AST SERPL-CCNC: 39 U/L (ref 10–40)
BACTERIA BLD CULT: NORMAL
BACTERIA BLD CULT: NORMAL
BASOPHILS # BLD AUTO: 0.01 K/UL (ref 0–0.2)
BASOPHILS NFR BLD: 0.2 % (ref 0–1.9)
BILIRUB SERPL-MCNC: 3.4 MG/DL (ref 0.1–1)
BUN SERPL-MCNC: 20 MG/DL (ref 6–20)
CALCIUM SERPL-MCNC: 8.2 MG/DL (ref 8.7–10.5)
CHLORIDE SERPL-SCNC: 108 MMOL/L (ref 95–110)
CO2 SERPL-SCNC: 22 MMOL/L (ref 23–29)
CREAT SERPL-MCNC: 0.5 MG/DL (ref 0.5–1.4)
DIFFERENTIAL METHOD: ABNORMAL
EOSINOPHIL # BLD AUTO: 0 K/UL (ref 0–0.5)
EOSINOPHIL NFR BLD: 0 % (ref 0–8)
ERYTHROCYTE [DISTWIDTH] IN BLOOD BY AUTOMATED COUNT: 16.8 % (ref 11.5–14.5)
EST. GFR  (NO RACE VARIABLE): >60 ML/MIN/1.73 M^2
GLUCOSE SERPL-MCNC: 132 MG/DL (ref 70–110)
HCT VFR BLD AUTO: 23.7 % (ref 37–48.5)
HGB BLD-MCNC: 7.6 G/DL (ref 12–16)
IMM GRANULOCYTES # BLD AUTO: 0.03 K/UL (ref 0–0.04)
IMM GRANULOCYTES NFR BLD AUTO: 0.5 % (ref 0–0.5)
INR PPP: 1.4 (ref 0.8–1.2)
LYMPHOCYTES # BLD AUTO: 0.4 K/UL (ref 1–4.8)
LYMPHOCYTES NFR BLD: 6.2 % (ref 18–48)
MAGNESIUM SERPL-MCNC: 1.7 MG/DL (ref 1.6–2.6)
MCH RBC QN AUTO: 28 PG (ref 27–31)
MCHC RBC AUTO-ENTMCNC: 32.1 G/DL (ref 32–36)
MCV RBC AUTO: 88 FL (ref 82–98)
MONOCYTES # BLD AUTO: 0.7 K/UL (ref 0.3–1)
MONOCYTES NFR BLD: 10.2 % (ref 4–15)
NEUTROPHILS # BLD AUTO: 5.5 K/UL (ref 1.8–7.7)
NEUTROPHILS NFR BLD: 82.9 % (ref 38–73)
NRBC BLD-RTO: 0 /100 WBC
PHOSPHATE SERPL-MCNC: 3.9 MG/DL (ref 2.7–4.5)
PLATELET # BLD AUTO: 158 K/UL (ref 150–450)
PMV BLD AUTO: 9.7 FL (ref 9.2–12.9)
POTASSIUM SERPL-SCNC: 4.8 MMOL/L (ref 3.5–5.1)
PROT SERPL-MCNC: 5.7 G/DL (ref 6–8.4)
PROTHROMBIN TIME: 14.6 SEC (ref 9–12.5)
RBC # BLD AUTO: 2.71 M/UL (ref 4–5.4)
SODIUM SERPL-SCNC: 134 MMOL/L (ref 136–145)
VANCOMYCIN TROUGH SERPL-MCNC: 11.1 UG/ML (ref 10–22)
WBC # BLD AUTO: 6.57 K/UL (ref 3.9–12.7)

## 2023-01-25 PROCEDURE — 27000221 HC OXYGEN, UP TO 24 HOURS

## 2023-01-25 PROCEDURE — 83735 ASSAY OF MAGNESIUM: CPT | Performed by: STUDENT IN AN ORGANIZED HEALTH CARE EDUCATION/TRAINING PROGRAM

## 2023-01-25 PROCEDURE — 63600175 PHARM REV CODE 636 W HCPCS: Performed by: STUDENT IN AN ORGANIZED HEALTH CARE EDUCATION/TRAINING PROGRAM

## 2023-01-25 PROCEDURE — 85730 THROMBOPLASTIN TIME PARTIAL: CPT

## 2023-01-25 PROCEDURE — 94761 N-INVAS EAR/PLS OXIMETRY MLT: CPT

## 2023-01-25 PROCEDURE — 63600175 PHARM REV CODE 636 W HCPCS

## 2023-01-25 PROCEDURE — 63600175 PHARM REV CODE 636 W HCPCS: Performed by: PSYCHIATRY & NEUROLOGY

## 2023-01-25 PROCEDURE — 99233 PR SUBSEQUENT HOSPITAL CARE,LEVL III: ICD-10-PCS | Mod: ,,, | Performed by: INTERNAL MEDICINE

## 2023-01-25 PROCEDURE — 25000003 PHARM REV CODE 250

## 2023-01-25 PROCEDURE — 85610 PROTHROMBIN TIME: CPT

## 2023-01-25 PROCEDURE — 25000003 PHARM REV CODE 250: Performed by: PHYSICIAN ASSISTANT

## 2023-01-25 PROCEDURE — 25000003 PHARM REV CODE 250: Performed by: STUDENT IN AN ORGANIZED HEALTH CARE EDUCATION/TRAINING PROGRAM

## 2023-01-25 PROCEDURE — 80053 COMPREHEN METABOLIC PANEL: CPT | Performed by: STUDENT IN AN ORGANIZED HEALTH CARE EDUCATION/TRAINING PROGRAM

## 2023-01-25 PROCEDURE — 20000000 HC ICU ROOM

## 2023-01-25 PROCEDURE — 85025 COMPLETE CBC W/AUTO DIFF WBC: CPT | Performed by: PSYCHIATRY & NEUROLOGY

## 2023-01-25 PROCEDURE — 99233 SBSQ HOSP IP/OBS HIGH 50: CPT | Mod: FS,,, | Performed by: PHYSICIAN ASSISTANT

## 2023-01-25 PROCEDURE — 80202 ASSAY OF VANCOMYCIN: CPT | Performed by: PSYCHIATRY & NEUROLOGY

## 2023-01-25 PROCEDURE — 25000003 PHARM REV CODE 250: Performed by: INTERNAL MEDICINE

## 2023-01-25 PROCEDURE — 99233 SBSQ HOSP IP/OBS HIGH 50: CPT | Mod: ,,, | Performed by: INTERNAL MEDICINE

## 2023-01-25 PROCEDURE — 99233 PR SUBSEQUENT HOSPITAL CARE,LEVL III: ICD-10-PCS | Mod: FS,,, | Performed by: PHYSICIAN ASSISTANT

## 2023-01-25 PROCEDURE — 84100 ASSAY OF PHOSPHORUS: CPT | Performed by: STUDENT IN AN ORGANIZED HEALTH CARE EDUCATION/TRAINING PROGRAM

## 2023-01-25 PROCEDURE — 27000207 HC ISOLATION

## 2023-01-25 PROCEDURE — 25000003 PHARM REV CODE 250: Performed by: PSYCHIATRY & NEUROLOGY

## 2023-01-25 RX ORDER — LABETALOL HCL 20 MG/4 ML
10 SYRINGE (ML) INTRAVENOUS EVERY 4 HOURS PRN
Status: DISCONTINUED | OUTPATIENT
Start: 2023-01-25 | End: 2023-02-08

## 2023-01-25 RX ADMIN — Medication: at 09:01

## 2023-01-25 RX ADMIN — METHOCARBAMOL 1000 MG: 500 TABLET ORAL at 08:01

## 2023-01-25 RX ADMIN — DIAZEPAM 5 MG: 5 TABLET ORAL at 08:01

## 2023-01-25 RX ADMIN — METHOCARBAMOL 1000 MG: 500 TABLET ORAL at 01:01

## 2023-01-25 RX ADMIN — PANTOPRAZOLE SODIUM 40 MG: 40 TABLET, DELAYED RELEASE ORAL at 08:01

## 2023-01-25 RX ADMIN — ACETAMINOPHEN 1000 MG: 500 TABLET ORAL at 09:01

## 2023-01-25 RX ADMIN — BUPROPION HYDROCHLORIDE 300 MG: 300 TABLET, FILM COATED, EXTENDED RELEASE ORAL at 08:01

## 2023-01-25 RX ADMIN — Medication: at 05:01

## 2023-01-25 RX ADMIN — Medication: at 01:01

## 2023-01-25 RX ADMIN — DIAZEPAM 5 MG: 5 TABLET ORAL at 09:01

## 2023-01-25 RX ADMIN — HEPARIN SODIUM 5000 UNITS: 5000 INJECTION INTRAVENOUS; SUBCUTANEOUS at 01:01

## 2023-01-25 RX ADMIN — GABAPENTIN 600 MG: 300 CAPSULE ORAL at 08:01

## 2023-01-25 RX ADMIN — SODIUM CHLORIDE 1000 ML: 9 INJECTION, SOLUTION INTRAVENOUS at 09:01

## 2023-01-25 RX ADMIN — HEPARIN SODIUM 5000 UNITS: 5000 INJECTION INTRAVENOUS; SUBCUTANEOUS at 11:01

## 2023-01-25 RX ADMIN — POTASSIUM CHLORIDE 20 MEQ: 1500 TABLET, EXTENDED RELEASE ORAL at 08:01

## 2023-01-25 RX ADMIN — MEROPENEM 2 G: 1 INJECTION INTRAVENOUS at 07:01

## 2023-01-25 RX ADMIN — ACETAMINOPHEN 1000 MG: 500 TABLET ORAL at 08:01

## 2023-01-25 RX ADMIN — MEROPENEM 2 G: 1 INJECTION INTRAVENOUS at 04:01

## 2023-01-25 RX ADMIN — GABAPENTIN 600 MG: 300 CAPSULE ORAL at 04:01

## 2023-01-25 RX ADMIN — VANCOMYCIN HYDROCHLORIDE 1750 MG: 500 INJECTION, POWDER, LYOPHILIZED, FOR SOLUTION INTRAVENOUS at 09:01

## 2023-01-25 RX ADMIN — VANCOMYCIN HYDROCHLORIDE 1500 MG: 1.5 INJECTION, POWDER, LYOPHILIZED, FOR SOLUTION INTRAVENOUS at 09:01

## 2023-01-25 RX ADMIN — METHOCARBAMOL 1000 MG: 500 TABLET ORAL at 09:01

## 2023-01-25 RX ADMIN — METHOCARBAMOL 1000 MG: 500 TABLET ORAL at 04:01

## 2023-01-25 RX ADMIN — GABAPENTIN 600 MG: 300 CAPSULE ORAL at 09:01

## 2023-01-25 RX ADMIN — Medication: at 08:01

## 2023-01-25 RX ADMIN — ACETAMINOPHEN 1000 MG: 500 TABLET ORAL at 04:01

## 2023-01-25 RX ADMIN — SODIUM CHLORIDE: 9 INJECTION, SOLUTION INTRAVENOUS at 10:01

## 2023-01-25 RX ADMIN — HEPARIN SODIUM 5000 UNITS: 5000 INJECTION INTRAVENOUS; SUBCUTANEOUS at 06:01

## 2023-01-25 NOTE — SUBJECTIVE & OBJECTIVE
Interval History: Underwent planned NSGY today (01/24). Remains afebrile, no leukocytosis, but leukocytes jumped from 1.8 to 10.    Review of Systems   Constitutional:  Positive for activity change and fatigue. Negative for appetite change, chills, diaphoresis and fever.   HENT: Negative.     Eyes:  Negative for pain.   Respiratory:  Negative for cough, shortness of breath and wheezing.    Cardiovascular:  Negative for chest pain and leg swelling.   Gastrointestinal:  Negative for abdominal pain, constipation, diarrhea, nausea and vomiting.   Genitourinary:  Negative for difficulty urinating and dysuria.   Musculoskeletal:  Positive for back pain. Negative for arthralgias and neck pain.   Skin:  Negative for rash and wound.   Neurological:  Positive for weakness (bilateral lower extremity weakness L>R.) and numbness. Negative for dizziness and headaches.   Psychiatric/Behavioral:  Negative for agitation and confusion.    Objective:     Vital Signs (Most Recent):  Temp: 97.6 °F (36.4 °C) (01/24/23 1901)  Pulse: 104 (01/24/23 2101)  Resp: 16 (01/24/23 2306)  BP: 118/72 (01/24/23 2101)  SpO2: (!) 92 % (01/24/23 2101)   Vital Signs (24h Range):  Temp:  [96.4 °F (35.8 °C)-98.6 °F (37 °C)] 97.6 °F (36.4 °C)  Pulse:  [] 104  Resp:  [10-35] 16  SpO2:  [79 %-100 %] 92 %  BP: ()/(39-75) 118/72     Weight: 104 kg (229 lb 4.5 oz)  Body mass index is 35.91 kg/m².    Estimated Creatinine Clearance: 151.6 mL/min (based on SCr of 0.6 mg/dL).    Physical Exam  Vitals and nursing note reviewed.   Constitutional:       General: She is not in acute distress.     Appearance: She is obese. She is not ill-appearing, toxic-appearing or diaphoretic.   HENT:      Head: Normocephalic.      Mouth/Throat:      Mouth: Mucous membranes are moist.   Eyes:      General: No scleral icterus.     Conjunctiva/sclera: Conjunctivae normal.   Cardiovascular:      Rate and Rhythm: Normal rate and regular rhythm.      Heart sounds: Murmur heard.    Pulmonary:      Effort: Pulmonary effort is normal. No respiratory distress.      Breath sounds: Normal breath sounds. No wheezing or rales.   Abdominal:      General: There is no distension.      Palpations: Abdomen is soft.      Tenderness: There is no abdominal tenderness. There is no guarding.   Musculoskeletal:         General: No tenderness.      Cervical back: No rigidity or tenderness.      Right lower leg: No edema.      Left lower leg: No edema.      Comments: Left shoulder with well healed arthroscopy scar.  No tenderness, warmth, swelling, or erythema.    Skin:     General: Skin is warm and dry.      Comments: Bilateral lower extremity stasis changes   tattoos   Neurological:      Mental Status: She is alert and oriented to person, place, and time. Mental status is at baseline.      Sensory: Sensory deficit (BLE) present.      Motor: Weakness present.   Psychiatric:         Mood and Affect: Mood normal.         Behavior: Behavior normal.       Significant Labs: All pertinent labs within the past 24 hours have been reviewed.    Significant Imaging: I have reviewed all pertinent imaging results/findings within the past 24 hours.

## 2023-01-25 NOTE — PLAN OF CARE
[facility] Care Plan    POC reviewed with Rachel Zazueta at 1400. Pt verbalized understanding. Questions and concerns addressed. No acute events today. Pt progressing toward goals. Will continue to monitor. See below and flowsheets for full assessment and VS info.       - no vamping in room addressed  1l bolus given      Is this a stroke patient? no    Neuro:  [place] Coma Scale  Best Eye Response: 3-->(E3) to speech  Best Motor Response: 6-->(M6) obeys commands  Best Verbal Response: 5-->(V5) oriented  [place] Coma Scale Score: 14  Assessment Qualifiers: patient not sedated/intubated  Pupil PERRLA: yes     24 hr Temp:  [97.3 °F (36.3 °C)-98.6 °F (37 °C)]     CV:   Rhythm: sinus tachycardia  BP goals:   SBP < 140  MAP > 65    Resp:           Plan:  1L NC    GI/:     Diet/Nutrition Received: regular  Last Bowel Movement: 01/23/23  Voiding Characteristics: urethral catheter (bladder)    Intake/Output Summary (Last 24 hours) at 1/25/2023 1706  Last data filed at 1/25/2023 1602  Gross per 24 hour   Intake 5344.13 ml   Output 2020 ml   Net 3324.13 ml     Unmeasured Output  Urine Occurrence: 1  Stool Occurrence: 0    Labs/Accuchecks:  Recent Labs   Lab 01/25/23  0240   WBC 6.57   RBC 2.71*   HGB 7.6*   HCT 23.7*         Recent Labs   Lab 01/25/23  0108   *   K 4.8   CO2 22*      BUN 20   CREATININE 0.5   ALKPHOS 92   ALT 24   AST 39   BILITOT 3.4*      Recent Labs   Lab 01/25/23  0108   INR 1.4*   APTT 25.0      Recent Labs   Lab 01/20/23  0543   CPK 48       Electrolytes: No replacement orders  Accuchecks: none    Gtts:   sodium chloride 0.9% 100 mL/hr at 01/25/23 1602    hydromorphone in 0.9 % NaCl 6 mg/30 ml         LDA/Wounds:  Lines/Drains/Airways       Central Venous Catheter Line  Duration             Percutaneous Central Line Insertion/Assessment - Triple Lumen  01/24/23 0900 right internal jugular 1 day              Drain  Duration                  Closed/Suction Drain 01/24/23 Posterior Back  Accordion 10 Fr. 1 day         Closed/Suction Drain 01/24/23 Posterior;Right Back Accordion 10 Fr. 1 day         Urethral Catheter 01/24/23 0832 Non-latex;Straight-tip 16 Fr. 1 day              Peripheral Intravenous Line  Duration                  Midline Catheter Insertion/Assessment  - Single Lumen 01/12/23 2132 Right basilic vein (medial side of arm) 18g x 10cm 12 days                  Wounds: No  Wound care consulted: No

## 2023-01-25 NOTE — PLAN OF CARE
[facility] Care Plan    POC reviewed with Rachel Wolffivethcelestino and family at 1400. Pt verbalized understanding. Questions and concerns addressed. No acute events today. Pt progressing toward goals. Will continue to monitor. See below and flowsheets for full assessment and VS info.     PCA pump in place. NS gtt cont  IV abx administered  Pt tearful post op. Pain management reviewed.      Is this a stroke patient? no    Neuro:  Jane Coma Scale  Best Eye Response: 4-->(E4) spontaneous  Best Motor Response: 6-->(M6) obeys commands  Best Verbal Response: 5-->(V5) oriented  [place] Coma Scale Score: 15  Assessment Qualifiers: patient not sedated/intubated  Pupil PERRLA: yes     24 hr Temp:  [96.4 °F (35.8 °C)-98.6 °F (37 °C)]     CV:   Rhythm: normal sinus rhythm  BP goals:   SBP < 180  MAP > 65    Resp:           Plan: N/A    GI/:     Diet/Nutrition Received: regular  Last Bowel Movement: 01/23/23  Voiding Characteristics: urethral catheter (bladder)    Intake/Output Summary (Last 24 hours) at 1/24/2023 1839  Last data filed at 1/24/2023 1835  Gross per 24 hour   Intake 5065.35 ml   Output 2820 ml   Net 2245.35 ml     Unmeasured Output  Urine Occurrence: 1  Stool Occurrence: 0    Labs/Accuchecks:  Recent Labs   Lab 01/24/23  1304   WBC 10.34   RBC 2.89*   HGB 7.9*   HCT 26.2*         Recent Labs   Lab 01/24/23  0401 01/24/23  1304   * 135*   K 4.1 4.7   CO2 28 24    108   BUN 13 14   CREATININE 0.5 0.6   ALKPHOS 123  --    ALT 26  --    AST 43*  --    BILITOT 1.4*  --       Recent Labs   Lab 01/23/23  1553   INR 1.3*   APTT 29.1      Recent Labs   Lab 01/20/23  0543   CPK 48       Electrolytes: N/A - electrolytes WDL  Accuchecks: none    Gtts:   sodium chloride 0.9% 100 mL/hr at 01/24/23 1835    hydromorphone in 0.9 % NaCl 6 mg/30 ml         LDA/Wounds:  Lines/Drains/Airways       Central Venous Catheter Line  Duration             Percutaneous Central Line Insertion/Assessment - Triple Lumen  01/24/23  0900 right internal jugular <1 day              Drain  Duration                  Closed/Suction Drain 01/24/23 Posterior Back Accordion 10 Fr. <1 day         Closed/Suction Drain 01/24/23 Posterior;Right Back Accordion 10 Fr. <1 day         Urethral Catheter 01/24/23 0832 Non-latex;Straight-tip 16 Fr. <1 day              Peripheral Intravenous Line  Duration                  Midline Catheter Insertion/Assessment  - Single Lumen 01/12/23 2138 Right basilic vein (medial side of arm) 18g x 10cm 11 days                  Wounds: Yes  Wound care consulted: No

## 2023-01-25 NOTE — HOSPITAL COURSE
1/25/2023 NAEO, pain well controlled on current regimen. Continue ABX. Maintain logroll precautions and HOB <30 until second stage of surgery.  01/26/2023 Hemoglobin 6.6, repeat 6.2. Increased drainage noted from bilateral hemovac. S/p 1u pRBC. PICC team consulted for long term access for antibiotics, remove IJ central line when PICC placed.   01/27/2023: NAEON. HV to gravity with decreased drainge. Hgb 7.2 this AM, will trend CBC q12h. Lytes replaced. Bowel regimen adjusted.   01/28/2023: NAEON. Received 1 unit PBRC yesterday. Hgb 7.4 this AM. Plan for OR 2/2.   01/29/2023: NAEON. HV drainage decreasing. Hgb stable. Plan for OR 2/2 01/30/2023 Stable exam. Significant solid BM's  01/31/2023 Exam stable, patient in much better spirits today  02/01/2023 Exam stable  02/02/2023 Post-operatively heavily sedated. Oral airway in place  02/03/2023 Doing very well post operatively. Advance care as appropriate  2/4/2023: d/c gu cath, continue MAP goal until Sunday per NSGY team, new gtt available  2/5/2023: completed MAP goals this evening.  02/06/2023 received 1U PRBCs 6.4 with appropriate increase in Hb. Fibrinogen 159, repeat 165.  02/07/2023 Increased Lasix. Pending stepdown to

## 2023-01-25 NOTE — PLAN OF CARE
[facility] Care Plan    POC reviewed with Rachel Zazueta and family at 0300. Pt verbalized understanding. Questions and concerns addressed. No acute events overnight. Pt progressing toward goals. Will continue to monitor. See below and flowsheets for full assessment and VS info.     - Pt has PCA pump for pain management  - Diazepam given x1 for muscle spasm  - Bath completed      Is this a stroke patient? no    Neuro:  [place] Coma Scale  Best Eye Response: 4-->(E4) spontaneous  Best Motor Response: 6-->(M6) obeys commands  Best Verbal Response: 5-->(V5) oriented  Jane Coma Scale Score: 15  Assessment Qualifiers: patient not sedated/intubated, no eye obstruction present  Pupil PERRLA: yes     24hr Temp:  [96.4 °F (35.8 °C)-97.6 °F (36.4 °C)]     CV:   Rhythm: normal sinus rhythm  BP goals:   SBP < 140  MAP > 65    Resp:           Plan:  1LNC to maintain O2 sats >95%    GI/:     Diet/Nutrition Received: regular  Last Bowel Movement: 01/23/23  Voiding Characteristics: urethral catheter (bladder)    Intake/Output Summary (Last 24 hours) at 1/25/2023 0742  Last data filed at 1/25/2023 0719  Gross per 24 hour   Intake 6300.74 ml   Output 3160 ml   Net 3140.74 ml     Unmeasured Output  Urine Occurrence: 1  Stool Occurrence: 0    Labs/Accuchecks:  Recent Labs   Lab 01/25/23  0240   WBC 6.57   RBC 2.71*   HGB 7.6*   HCT 23.7*         Recent Labs   Lab 01/25/23  0108   *   K 4.8   CO2 22*      BUN 20   CREATININE 0.5   ALKPHOS 92   ALT 24   AST 39   BILITOT 3.4*      Recent Labs   Lab 01/25/23  0108   INR 1.4*   APTT 25.0      Recent Labs   Lab 01/20/23  0543   CPK 48       Electrolytes: No replacement orders  Accuchecks: none    Gtts:   sodium chloride 0.9% 100 mL/hr at 01/25/23 0601    hydromorphone in 0.9 % NaCl 6 mg/30 ml         LDA/Wounds:  Lines/Drains/Airways       Central Venous Catheter Line  Duration             Percutaneous Central Line Insertion/Assessment - Triple Lumen  01/24/23 0900  right internal jugular <1 day              Drain  Duration                  Closed/Suction Drain 01/24/23 Posterior Back Accordion 10 Fr. 1 day         Closed/Suction Drain 01/24/23 Posterior;Right Back Accordion 10 Fr. 1 day         Urethral Catheter 01/24/23 0832 Non-latex;Straight-tip 16 Fr. <1 day              Peripheral Intravenous Line  Duration                  Midline Catheter Insertion/Assessment  - Single Lumen 01/12/23 2138 Right basilic vein (medial side of arm) 18g x 10cm 12 days                  Wounds: No  Wound care consulted: No

## 2023-01-25 NOTE — PROGRESS NOTES
"[facility]  Infectious Disease  Progress Note    Patient Name: Rachel Zazueta  MRN: [id number]  Admission Date: 1/19/2023  Length of Stay: 5 days  Attending Physician: Mary Archer MD  Primary Care Provider: Dannielle Merino DO    Isolation Status: Contact  Assessment/Plan:      Thoracic discitis    42-year-old woman known to ID with hx of IVDU, chronic HCV with cirrhosis, obesity, neurogenic bladder, chronic pancytopenia, s/p L-spine fusion (04/2021), lumbar epidural abscess (+ GBS) with removal of hardware (02/2022), and T10-pelvis spinal fusion with hardware (03/2022), s/p 6 weeks IV abx followed by chronic amoxicillin suppression, recent history of pyogenic arthritis of right shoulder s/p  I&D on 11/15/22 with cx positive for E Coli ( incomplete treatment, though patient reports she completed course Bactrim), who was admitted to [facility]  12/23/22 with back pain and increasing weakness in the lower extremities,  ESBL E.Coli bacteremia (blood cx cleared 12/28, tx with meropenem approx 14 days).  During that admission she developed progressive LE weakness, and was transferred to [facility] for advanced imaging and Neurosurgery evaluation. See HPI for more detailed hx.       On arrival at [facility], MRI C/T/L (01/19/23): c/f worsening T9-T10 compression fractures and kyphosis, "haloing" of T10 and pelvic screws, extensive edema in the paraspinal muscles, possible sacroilitis. Limited exam of lumbar spine. CT with spondylodiscitis T8-9, T9-10.   -- On MRI, C-spine notable for marrow edema signal of the C5-C7 vertebral bodies and enhancing C6 inferior endplate Schmorl's node, favored to relate to degenerative change, but unable to r/o early vertebral spondylo-discitis. Also, there is small focus of enhancement and edema at the tip of the C7 spinous process  --No signs of persistent infection in left shoulder on exam.     Patient underwent planned NSGY (01/24); Revision/extension fusion w/ " washout debridement of florid infection, Removal of previous [id number] instrumentation, T6-12 temporary fixation w/ pedicle screws & Rods, T8-10 laminectomy, primary closure w/ wound vac placement.   --Multiple tissue / bone cxs obtained during surgery, sent for pathology, and culture -- results pending.    --Blood cultures (01/20): NGTD.    Pt remains afebrile, no leukocytosis, but marked elevation to 10.3 (up from 1.8). ESR 70, CRP 9.4.  Urine cx with multiple organisms none in predominance (no urinary symptoms).    Patient on oral amoxicillin 1G Q12h -- as chronic suppressive therapy.      Plan/recommendations:  1. Recommend discontinuing PO-Amoxicillin.  2. To start IV-Meropenem 2g q8h (or 6g q24h continuous infusion)  3. Anticipate duration of at-least 6wks s/p last surgical date.  4. Will follow blood cxs (01/20) NGTD; and for intra-op cxs / path -- to tailor abx accordingly.    -- Discussed with ID staff and primary team   -- ID will continue to follow w/ further recs.      Thank you for your consult. I will follow-up with patient. Please contact us if you have any additional questions.    Himanshu Grimm PA-C  Infectious Disease  [facility]    Subjective:     Principal Problem:Weakness of both lower extremities    HPI: Ms. Zazueta is a 42-year-old woman known to ID with hx of IVDU, IV drug use (methamphetamine), chronic HCV with cirrhosis, chronic pancytopenia, spinal fusion (04/2021), epidural abscess (GBS) with removal of hardware (02/2022), T10-pelvis spinal fusion with hardware (03/2022), obesity,  and neurogenic bladder who was admitted to [facility] on 12/23/22 with back pain and increasing weakness in the lower extremities.       Urine and blood cx from that admission + for ESBL E coli which was treated with ertapenem.  Repeat blood cx on 12/28 and 1/10 negative.   Patient started on physical therapy but weakness in the lower extremities increased, and the patient is no  longer able to walk. She is able to urinate intermittently and defecate.      An MRI cervical spine 1/10 was significant  for multilevel spondylosis.  MRI brain with nonspecific findings.  Xray of spine notable for focal kyphosis at T9-10 increased when compared to prior scoliosis radiographs.  An MRI of the spine was not available due to the patient's size. and she was transferred to [facility] for advanced imaging and Neurosurgery evaluation.      MRI  imaging here  on 1/20 concerning for worsening kyphosis, extensive edema in the paraspinal muscle. CT with spondylodiscitis T8-9, T9-10    Neurosurgery planning hardware removal and washout on 1/24    Of note, review of records show admission to [facility] 11/14/2022 to 11/22/2022 with pyogenic arthritis of the left shoulder, s/p I&D on 11/15 with cx positive for E Coli.  Utox was positive for methadone, opiates, amphetamines, and THC.  Seen by ID at [facility] who recommend IV ceftriaxone X 6 weeks at LTAC, but patient was not agreeable to this.  Offered  daily ceftriaxone infusions at infusion center.  At discharge from there she had received 8 days of ceftriaxone. Review of notes show she was subsequently prescribed 4 weeks of Bactrim 2 DS tabs twice daily.       Interval History: Underwent planned NSGY today (01/24). Remains afebrile, no leukocytosis, but leukocytes jumped from 1.8 to 10.    Review of Systems   Constitutional:  Positive for activity change and fatigue. Negative for appetite change, chills, diaphoresis and fever.   HENT: Negative.     Eyes:  Negative for pain.   Respiratory:  Negative for cough, shortness of breath and wheezing.    Cardiovascular:  Negative for chest pain and leg swelling.   Gastrointestinal:  Negative for abdominal pain, constipation, diarrhea, nausea and vomiting.   Genitourinary:  Negative for difficulty urinating and dysuria.   Musculoskeletal:  Positive for back pain. Negative for arthralgias and neck pain.    Skin:  Negative for rash and wound.   Neurological:  Positive for weakness (bilateral lower extremity weakness L>R.) and numbness. Negative for dizziness and headaches.   Psychiatric/Behavioral:  Negative for agitation and confusion.    Objective:     Vital Signs (Most Recent):  Temp: 97.6 °F (36.4 °C) (01/24/23 1901)  Pulse: 104 (01/24/23 2101)  Resp: 16 (01/24/23 2306)  BP: 118/72 (01/24/23 2101)  SpO2: (!) 92 % (01/24/23 2101)   Vital Signs (24h Range):  Temp:  [96.4 °F (35.8 °C)-98.6 °F (37 °C)] 97.6 °F (36.4 °C)  Pulse:  [] 104  Resp:  [10-35] 16  SpO2:  [79 %-100 %] 92 %  BP: ()/(39-75) 118/72     Weight: 104 kg (229 lb 4.5 oz)  Body mass index is 35.91 kg/m².    Estimated Creatinine Clearance: 151.6 mL/min (based on SCr of 0.6 mg/dL).    Physical Exam  Vitals and nursing note reviewed.   Constitutional:       General: She is not in acute distress.     Appearance: She is obese. She is not ill-appearing, toxic-appearing or diaphoretic.   HENT:      Head: Normocephalic.      Mouth/Throat:      Mouth: Mucous membranes are moist.   Eyes:      General: No scleral icterus.     Conjunctiva/sclera: Conjunctivae normal.   Cardiovascular:      Rate and Rhythm: Normal rate and regular rhythm.      Heart sounds: Murmur heard.   Pulmonary:      Effort: Pulmonary effort is normal. No respiratory distress.      Breath sounds: Normal breath sounds. No wheezing or rales.   Abdominal:      General: There is no distension.      Palpations: Abdomen is soft.      Tenderness: There is no abdominal tenderness. There is no guarding.   Musculoskeletal:         General: No tenderness.      Cervical back: No rigidity or tenderness.      Right lower leg: No edema.      Left lower leg: No edema.      Comments: Left shoulder with well healed arthroscopy scar.  No tenderness, warmth, swelling, or erythema.    Skin:     General: Skin is warm and dry.      Comments: Bilateral lower extremity stasis changes   tattoos    Neurological:      Mental Status: She is alert and oriented to person, place, and time. Mental status is at baseline.      Sensory: Sensory deficit (BLE) present.      Motor: Weakness present.   Psychiatric:         Mood and Affect: Mood normal.         Behavior: Behavior normal.       Significant Labs: All pertinent labs within the past 24 hours have been reviewed.    Significant Imaging: I have reviewed all pertinent imaging results/findings within the past 24 hours.

## 2023-01-25 NOTE — PROGRESS NOTES
[facility]  Neurocritical Care  Progress Note    Admit Date: 1/19/2023  Service Date: 01/25/2023  Length of Stay: 6    Subjective:     Chief Complaint: Weakness of both lower extremities    History of Present Illness: 42-year-old female with a history of IV drug use, chronic hep C with cirrhosis, spinal fusion in April 2021, complicated by epidural abscess with removal of hardware February 2022 and against spinal fusion in March 2022 of T10 through the pelvis as well as neurogenic bladder who initially presented to [facility] in late December for generalized weakness, fatigue and bilateral flank pain.  Her course at [facility] was complicated by a E coli bacteremia thought to be due to a urinary source, she is completed a 7 day course of meropenem on 1/3, and had bcx from 1/2 that did not grow any bacteria.       She also had an ICU admission for ongoing back spasms and agitation requiring Precedex.  In early January patient started developing progressive lower extremity weakness greater in the left compared to the right.  She also had an MRI at the outside hospital that showed some white matter changes concerning for possible demyelinating disease.  However her thoracic spine radiographs also showed worsening of her kyphosis at T9-T10.  Due to the concern for either a demyelinating polyneuropathy, MS, or spinal cord compression the patient was transferred to [facility] for neurosurgical evaluation.       On admission the patient had a CT lumbar and thoracic spine and an MRI CTL spine. The MRI showed  focal kyphosis T8 through T10, with possible associated cord edema signal.  As well as extensive edema throughout the posterior paraspinal musculature concerning for possible infectious or inflammatory process.  There was concern that the findings at T8 through T10 could correspond with compressive myelopathy.  The CT lumbar spine and thoracic spine showed spondylodiscitis at T8  through T10, as well as pathologic compression fractures at T9 and T10.  There were also erosive changes involving the sacroiliac joints bilaterally which was concerning for sacroiliitis and could be infectious.     Patient underwent laminectomy T8-T10; posterior fusion T8-T12; and washout by NSGY today. No complications noted during surgery. Patient is HDS and on 3L NC. Patient is currently on a PCA dilaudid pump.            Hospital Course: 1/25/2023 NAEO, pain well controlled on current regimen. Continue ABX. Maintain logroll precautions and HOB <30 until second stage of surgery.      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9    CHOLECYSTECTOMY      KIDNEY SURGERY      LUMBAR FUSION Bilateral 3/2/2022    Procedure: FUSION, SPINE, LUMBAR;  Surgeon: Guille Templeton MD;  Location: [address];  Service: Neurosurgery;  Laterality: Bilateral;  AIRO  T10--Pelvis    LUMBAR FUSION N/A 1/24/2023    Procedure: **AIRO** T8-T12 POSTERIOR FUSION, T8-T10 LAMINECTOMY, HARDWARE REMOVAL AND WASHOUT;  Surgeon: Guille Templeton MD;  Location: [address];  Service: Neurosurgery;  Laterality: N/A;    REMOVAL OF HARDWARE FROM SPINE N/A 2/21/2022    Procedure: REMOVAL, HARDWARE, SPINE;  Surgeon: Guille Templeton MD;  Location: [address];  Service: Neurosurgery;  Laterality: N/A;  Washout    SPINE SURGERY        No current facility-administered medications on file prior to encounter.     Current Outpatient Medications on File Prior to Encounter   Medication Sig  Dispense Refill    albuterol (ACCUNEB) 1.25 mg/3 mL Nebu Take 3 mLs (1.25 mg total) by nebulization every 6 (six) hours as needed (shortness of breath). Rescue 75 mL 0    buPROPion (WELLBUTRIN) 100 MG tablet Take 1 tablet (100 mg total) by mouth 2 (two) times daily. 60 tablet 2    folic acid (FOLVITE) 1 MG tablet Take 1 tablet (1 mg total) by mouth once daily. (Patient not taking: Reported on 11/25/2022) 42 tablet 0    gabapentin (NEURONTIN) 300 MG capsule Take 1 capsule (300 mg total) by mouth 3 (three) times daily. 90 capsule 11    lactulose (CHRONULAC) 20 gram/30 mL Soln Take 30 mLs (20 g total) by mouth 3 (three) times daily. 2700 mL 2    pantoprazole (PROTONIX) 40 MG tablet Take 1 tablet (40 mg total) by mouth once daily. 30 tablet 1    [DISCONTINUED] diclofenac sodium (VOLTAREN) 1 % Gel Apply 2 g topically 4 (four) times daily. 50 g 0      Allergies: Bee venom protein (honey bee), Naproxen, Wasp sting [allergen ext-venom-honey bee], Adhesive, Iodine and iodide containing products, Shellfish containing products, and Nuts [tree nut]    Family History   Problem Relation Age of Onset    Hepatitis Mother     Drug abuse Mother     Liver cancer Mother     Drug abuse Father     Cirrhosis Father     Hepatitis Father      Social History     Tobacco Use    Smoking status: Some Days     Packs/day: 0.50     Years: 23.00     Pack years: 11.50     Types: Cigarettes    Smokeless tobacco: Never    Tobacco comments:     patient states she knows she nees to quit.   Substance Use Topics    Alcohol use: Not Currently     Comment: quit 2014    Drug use: Yes     Frequency: 4.0 times per week     Types: Marijuana     Comment: Patient denies needle use, only inhalation of methampehtamines or orally.  Last known drug use was prior to admission to hospital stay for surgery     Review of Systems   Constitutional:  Negative for fever.   HENT:  Negative for congestion and rhinorrhea.    Eyes:  Negative for visual  disturbance.   Respiratory:  Negative for shortness of breath.    Cardiovascular:  Negative for chest pain.   Gastrointestinal:  Negative for abdominal distention, abdominal pain, nausea and vomiting.   Genitourinary:  Negative for dysuria and hematuria.   Musculoskeletal:  Positive for back pain. Negative for neck pain.   Neurological:  Positive for weakness. Negative for dizziness, seizures, light-headedness, numbness and headaches.   Objective:     Vitals:    Temp: 98.6 °F (37 °C)  Pulse: (!) 123  Rhythm: normal sinus rhythm  BP: (!) 117/58  MAP (mmHg): 82  Resp: (!) 22  ETCO2 (mmHg): 35 mmHg  SpO2: 98 %    Temp  Min: 96.4 °F (35.8 °C)  Max: 98.6 °F (37 °C)  Pulse  Min: 80  Max: 126  BP  Min: 72/39  Max: 135/72  MAP (mmHg)  Min: 50  Max: 98  Resp  Min: 9  Max: 35  ETCO2 (mmHg)  Min: 30 mmHg  Max: 40 mmHg  SpO2  Min: 79 %  Max: 100 %    01/24 0701 - 01/25 0700  In: 6205.7 [I.V.:3927.3]  Out: 3320 [Urine:1590; Drains:730]   Unmeasured Output  Urine Occurrence: 1  Stool Occurrence: 0       Physical Exam  Vitals and nursing note reviewed.   Constitutional:       General: She is not in acute distress.     Appearance: She is obese. She is not ill-appearing, toxic-appearing or diaphoretic.   HENT:      Head: Normocephalic.      Mouth/Throat:      Mouth: Mucous membranes are moist.   Eyes:      General: No scleral icterus.        Right eye: No discharge.         Left eye: No discharge.   Cardiovascular:      Rate and Rhythm: Normal rate and regular rhythm.   Pulmonary:      Effort: Pulmonary effort is normal. No respiratory distress.      Comments: NC in place  Abdominal:      General: Abdomen is flat.      Palpations: Abdomen is soft.      Tenderness: There is no abdominal tenderness.   Musculoskeletal:         General: No deformity.      Cervical back: No rigidity.   Skin:     General: Skin is warm and dry.      Coloration: Skin is not jaundiced.   Neurological:      Mental Status: She is alert and oriented to person,  place, and time. Mental status is at baseline.      Motor: Weakness (Bilateral LE) and tremor (Bilateral UE) present.      Comments: E4 V5 M6    Awake, alert, and oriented to self, time, place, and situation. Patient is answering all questions appropriately and following all commands briskly.   No facial droop. EOMI. PERRL. Peripheral vision intact bilaterally.     Motor:   FOWLER spontaneously and follows commands  RUE: 3/5  LUE: 3/5  RLE: 2/5  LLE: 2/5    Sensory:  Sensation grossly intact    Exam limited due to post operative anesthesia   Psychiatric:         Mood and Affect: Mood normal.         Behavior: Behavior normal.         Today I personally reviewed pertinent medications, imaging, laboratory results, microbiology results,           Assessment/Plan:     Psychiatric  Anxiety and depression  Continue Buproprion for depression  Continue Lorazepam for anxiety  Continue Gabapentin for neuropathy and anxiety    Substance use disorder  Denies IV drug use (meth) for past 3 years.  Denies alcohol and tobacco abuse.       ID  Thoracic discitis  CT T/L spine thin cut 1/20 with haloing of pelvic and T10 screws, spondylodiscitis T8-9, T9-10  - CT C spine with concern for discitis at C6-7  Patient is currently afebrile with chronic pancytopenia  Previously on Amoxicillin up until surgery for chronic suppressive therapy.    -ID following  -Blood cx (1/20) NGTD  -Pending intra-operative cultures   -Continue Vancomycin and meropenem  -Daily CBC       GI  Chronic hepatitis C with cirrhosis  Hx of Hep C. See Cirrhosis of Liver    Cirrhosis of liver with ascites  Patient has reportedly refuse transplant evaluation in the past.     -Daily CMP and trend LFTs  -Continue Lactulose 20g TID   -Will need GI/hepatology follow up outpatient    -MELD-Na score: 17 at 1/25/2023  1:08 AM  MELD score: 15 at 1/25/2023  1:08 AM  Calculated from:  Serum Creatinine: 0.5 mg/dL (Using min of 1 mg/dL) at 1/25/2023  1:08 AM  Serum Sodium: 134 mmol/L  at 1/25/2023  1:08 AM  Total Bilirubin: 3.4 mg/dL at 1/25/2023  1:08 AM  INR(ratio): 1.4 at 1/25/2023  1:08 AM  Age: 42 years    Orthopedic  * Weakness of both lower extremities  41 yo F w/ history of multiple spinal surgeries presented to OSH with possible infection of patient's shoulder and found to have UTI and E. Coli bacteremia and progressively worse B/L LE weakness. Transferred to [facility] for neurosurgical evaluation. Underwent Laminectomy T8-T10; Posterior spinal fusion T8-T12; hardware removal and washout. Admitted to [facility] for HLOC. Arrived to unit on PCA dilaudid pump. Patient is HDS on 3L NC.     -Admit to [facility] for close monitoring in post op setting  -Neuro checks q1hr  -Vital signs q1hr  -NSGY following  -MAP goals >65  -SBP goals <180  -PRN labetalol, hydralazine  -Patient will need to lie flat with HOB 15-20  -Started on Vancomycin and meropenem  -Can start regular diet if passes Miller  -MM pain regimen; PCA Dilaudid pump for pain management  -TSH, Lipid panel, and A1C pending  -PT/INR, aPTT pending  -CBC, CMP, Mag, Phos daily   -PT/OT                The patient is being Prophylaxed for:  Venous Thromboembolism with: Chemical  Stress Ulcer with: Not Applicable   Ventilator Pneumonia with: not applicable    Activity Orders          Turn patient starting at 01/24 1800    Up in chair With meals starting at 01/24 1700    Diet Adult Regular (IDDSI Level 7): Regular starting at 01/24 1106    Progressive Mobility Protocol (mobilize patient to their highest level of functioning at least twice daily) starting at 01/24 1105    Elevate HOB 30 starting at 01/24 1105    Ambulate With Assistance, Post Op Day 0 If the patient arrives from PACU by 4PM, walk at least once on day of operation if alert and safe to do so. starting at 01/24 1104        Full Code    Brandy Anand PA-C  Neurocritical Care  [facility]

## 2023-01-25 NOTE — PT/OT/SLP PROGRESS
Occupational Therapy      Patient Name:  Rachel Zazueta   MRN:  [id number]    Patient not seen today secondary to HOB flat orders per Neurosurgery. As Will follow-up as appropriate.    1/25/2023

## 2023-01-25 NOTE — PROGRESS NOTES
[facility]  Neurosurgery  Progress Note    Subjective:     History of Present Illness: Ms. Zazueta is a 42 y.o F w/ hx ofIV drug use (methamphetamine), chronic HCV with cirrhosis, spinal fusion (04/2021), epidural abscess with removal of hardware (02/2022), spinal fusion with hardware (T10 - Pelvis / 03/2022), obesity (BMI 39.3) and neurogenic bladder and recent shoulder surgery who initially presented to [facility] for evaluation of back pain and left leg weakness.  She reports initially noting the left leg weakness when she was about to go to the bathroom and was about to fall but was assisted by her boyfriend.  She was brought to the ED via EMS.  Urinalysis with UTI concerns and blood cultures at OSH grew ESBL E coli, and shoulder effusion concern for infection so she was treated with meropenem.  During hospitalization, she reports the weakness that started out in her left leg initially then spread upwards to her left thigh, left hip, then crossed over to the right hip and spread to her right leg.  Denies recent IV drug use. She reports her last incidence of drug use was more than 3 years ago and also denies tobacco, and alcohol abuse.  Reports cannabis use.     OSH course notable for concerning urinalysis and blood cultures positive for ESBL E coli treated with meropenem.  She was also admit to the ICU where she was treated with Precedex for significant body aches, irritability, and muscle spasms.  Concerns of a murmur on physical exam so she underwent TTE which did not show any vegetations.  Worsening lower extremity weakness workup with MRI head nonspecific, MRI cervical spine with multilevel spondylosis, X-ray spine with multilevel thoracolumbar fusion and diskectomy, focal kyphosis at T9-10 increased compared to prior.  She was started on prophylaxis amoxicillin due to her history of infected hardware.  MRI spine unable to be completed due to patient's body habitus so she was  transferred to [facility] [place] for further imaging and Neurosurgery, Neurology evaluation.      Post-Op Info:  Procedure(s) (LRB):  **AIRO** T8-T12 POSTERIOR FUSION, T8-T10 LAMINECTOMY, HARDWARE REMOVAL AND WASHOUT (N/A)   1 Day Post-Op     Interval History: 1/25: OR yesterday for T6-12 PSIF, keke pus noted. Improved strength in lower extremities this morning. No fevers. OR cultures pending    Medications:  Continuous Infusions:   sodium chloride 0.9% 100 mL/hr at 01/25/23 0702    hydromorphone in 0.9 % NaCl 6 mg/30 ml       Scheduled Meds:   acetaminophen  1,000 mg Oral TID    buPROPion  300 mg Oral Daily    gabapentin  600 mg Oral TID    heparin (porcine)  5,000 Units Subcutaneous Q8H    lactulose  20 g Oral TID    meropenem (MERREM) IVPB  2 g Intravenous Q8H    methocarbamoL  1,000 mg Oral QID    mupirocin   Nasal BID    pantoprazole  40 mg Oral Daily    potassium chloride  20 mEq Oral Daily    vancomycin (VANCOCIN) IVPB  1,500 mg Intravenous Q12H     PRN Meds:dextrose 10%, dextrose 10%, diazePAM, glucagon (human recombinant), glucose, glucose, hydrALAZINE, labetalol, magnesium hydroxide 400 mg/5 ml, melatonin, naloxone, polyethylene glycol, promethazine, sodium chloride 0.9%, sodium chloride 0.9%, Pharmacy to dose Vancomycin consult **AND** vancomycin - pharmacy to dose     Review of Systems  Objective:     Weight: 104 kg (229 lb 4.5 oz)  Body mass index is 35.91 kg/m².  Vital Signs (Most Recent):  Temp: 98.6 °F (37 °C) (01/25/23 0702)  Pulse: (!) 120 (01/25/23 0702)  Resp: 17 (01/25/23 0702)  BP: 122/73 (01/25/23 0702)  SpO2: 98 % (01/25/23 0702) Vital Signs (24h Range):  Temp:  [96.4 °F (35.8 °C)-98.6 °F (37 °C)] 98.6 °F (37 °C)  Pulse:  [] 120  Resp:  [9-35] 17  SpO2:  [79 %-100 %] 98 %  BP: ()/(39-84) 122/73     Date 01/25/23 0700 - 01/26/23 0659   Shift 7199-3785 4219-3245 5243-8227 24 Hour Total   INTAKE   I.V.(mL/kg) 428.2(4.1)   428.2(4.1)   Shift Total(mL/kg) 428.2(4.1)   428.2(4.1)    OUTPUT   Shift Total(mL/kg)       Weight (kg) 104 104 104 104                            Neurosurgery Physical Exam  General: well developed, well nourished, no distress.   Head: normocephalic, atraumatic  Neurologic: Alert and oriented. Thought content appropriate.  GCS: Motor: 6/Verbal: 5/Eyes: 4 GCS Total: 15  Mental Status: Awake, Alert, Oriented x 4  Language: No aphasia  Speech: No dysarthria  Cranial nerves: face symmetric, tongue midline, CN II-XII grossly intact.   Eyes: pupils equal, round, reactive to light with accommodation, EOMI, nystagmus.   Pulmonary: normal respirations, no signs of respiratory distress  Abdomen: soft, non-distended, not tender to palpation  Skin: Skin is warm, dry and intact.  Sensory: intact to light touch throughout     Motor Strength:Moves all extremities spontaneously with good tone.  Full strength upper and extremities. No abnormal movements seen.      Strength   Deltoids Triceps Biceps Wrist Extension Wrist Flexion Hand    Upper: R 5/5 5/5 5/5 5/5 5/5 5/5     L 5/5 5/5 5/5 5/5 5/5 5/5       Iliopsoas Quadriceps Knee  Flexion Tibialis  anterior Gastro- cnemius EHL   Lower: R 3/5 3/5 3/5 4-/5 4-/5 4-/5     L 3/5 3/5 3/5 4-/5 4-/5 4-/5      Reflexes:   DTR: 2+ symmetrically throughout.  Parker's: Negative.  Babinski's: Present bilaterally  Clonus: 2 beats bilateral lower extremity     Incision well healed       Significant Labs:  Recent Labs   Lab 01/24/23  0401 01/24/23  1304 01/25/23  0108   GLU 81 159* 132*   * 135* 134*   K 4.1 4.7 4.8    108 108   CO2 28 24 22*   BUN 13 14 20   CREATININE 0.5 0.6 0.5   CALCIUM 8.5* 7.5* 8.2*   MG 1.7  --  1.7       Recent Labs   Lab 01/24/23  0401 01/24/23  0929 01/24/23  1135 01/24/23  1304 01/25/23  0240   WBC 2.35*  --   --  10.34 6.57   HGB 9.5*  --   --  7.9* 7.6*   HCT 31.2*   < > 27* 26.2* 23.7*   *  --   --  190 158    < > = values in this interval not displayed.       Recent Labs   Lab 01/23/23  9624  01/25/23  0108   INR 1.3* 1.4*   APTT 29.1 25.0       Microbiology Results (last 7 days)       Procedure Component Value Units Date/Time    Culture, Anaerobe [[id number]] Collected: 01/24/23 0913    Order Status: Completed Specimen: Body Fluid from Back Updated: 01/25/23 0658     Anaerobic Culture Culture in progress    Narrative:      1) Perispinal    Culture, Anaerobe [[id number]] Collected: 01/24/23 0943    Order Status: Completed Specimen: Bone from Back Updated: 01/25/23 0658     Anaerobic Culture Culture in progress    Narrative:      3) Perispinal    Culture, Anaerobe [[id number]] Collected: 01/24/23 0913    Order Status: Completed Specimen: Body Fluid from Back Updated: 01/25/23 0658     Anaerobic Culture Culture in progress    Narrative:      2) Perispinal    Culture, Anaerobe [[id number]] Collected: 01/24/23 0943    Order Status: Completed Specimen: Bone from Back Updated: 01/25/23 0658     Anaerobic Culture Culture in progress    Narrative:      4) Perispinal    Blood culture [[id number]] Collected: 01/20/23 1131    Order Status: Completed Specimen: Blood Updated: 01/24/23 1412     Blood Culture, Routine No Growth to date      No Growth to date      No Growth to date      No Growth to date      No Growth to date    Narrative:      Collection has been rescheduled by [facility] at 01/20/2023 11:03 Reason:   Patient unavailable in room with doctors   Collection has been rescheduled by [facility] at 01/20/2023 11:03 Reason:   Patient unavailable in room with doctors     Blood culture [[id number]] Collected: 01/20/23 1131    Order Status: Completed Specimen: Blood Updated: 01/24/23 1412     Blood Culture, Routine No Growth to date      No Growth to date      No Growth to date      No Growth to date      No Growth to date    Narrative:      Collection has been rescheduled by [facility] at 01/20/2023 11:03 Reason:   Patient unavailable in room with doctors   Collection has been rescheduled by [facility] at 01/20/2023 11:03 Reason:   Patient  unavailable in room with doctors     Gram stain [[id number]] Collected: 01/24/23 0913    Order Status: Completed Specimen: Body Fluid from Back Updated: 01/24/23 1239     Gram Stain Result No WBC's      No organisms seen    Narrative:      1) Perispinal    Gram stain [[id number]] Collected: 01/24/23 0944    Order Status: Completed Specimen: Bone from Back Updated: 01/24/23 1238     Gram Stain Result Rare WBC's      No organisms seen    Narrative:      3) Perispinal    Gram stain [[id number]] Collected: 01/24/23 0913    Order Status: Completed Specimen: Body Fluid from Back Updated: 01/24/23 1237     Gram Stain Result Rare WBC's      No organisms seen    Narrative:      2) Perispinal    Gram stain [[id number]] Collected: 01/24/23 0943    Order Status: Completed Specimen: Bone from Back Updated: 01/24/23 1236     Gram Stain Result No WBC's      No organisms seen    Narrative:      4) Perispinal    Aerobic culture [[id number]] Collected: 01/24/23 0914    Order Status: Sent Specimen: Wound from Back Updated: 01/24/23 1016    Fungus culture [[id number]] Collected: 01/24/23 0943    Order Status: Sent Specimen: Bone from Back Updated: 01/24/23 1009    Aerobic culture [[id number]] Collected: 01/24/23 0943    Order Status: Sent Specimen: Bone from Back Updated: 01/24/23 1008    AFB Culture & Smear [[id number]] Collected: 01/24/23 0943    Order Status: Sent Specimen: Bone from Back Updated: 01/24/23 1008    Aerobic culture [[id number]] Collected: 01/24/23 0943    Order Status: Sent Specimen: Bone from Back Updated: 01/24/23 1007    AFB Culture & Smear [[id number]] Collected: 01/24/23 0943    Order Status: Sent Specimen: Bone from Back Updated: 01/24/23 1007    Fungus culture [[id number]] Collected: 01/24/23 0943    Order Status: Sent Specimen: Bone from Back Updated: 01/24/23 1007    Aerobic culture [[id number]] Collected: 01/24/23 0914    Order Status: Sent Specimen: Wound from Back Updated: 01/24/23 1005    Fungus culture [[id number]]  Collected: 01/24/23 0913    Order Status: Sent Specimen: Body Fluid from Back Updated: 01/24/23 0929    AFB Culture & Smear [[id number]] Collected: 01/24/23 0913    Order Status: Sent Specimen: Body Fluid from Back Updated: 01/24/23 0929    Fungus culture [[id number]] Collected: 01/24/23 0913    Order Status: Sent Specimen: Body Fluid from Back Updated: 01/24/23 0927    AFB Culture & Smear [[id number]] Collected: 01/24/23 0913    Order Status: Sent Specimen: Body Fluid from Back Updated: 01/24/23 0926    Urine culture [[id number]] Collected: 01/20/23 0130    Order Status: Completed Specimen: Urine Updated: 01/21/23 1144     Urine Culture, Routine Multiple organisms isolated. None in predominance.  Repeat if      clinically necessary.    Narrative:      Specimen Source->Urine    Blood culture [[id number]]     Order Status: Canceled Specimen: Blood     Culture, Respiratory with Gram Stain [[id number]]     Order Status: Canceled Specimen: Respiratory           All pertinent labs from the last 24 hours have been reviewed.    Significant Diagnostics:  CT C-spine:  Vertebrae: The dens, lateral masses, and occipital condyles are intact.  There is no evidence of fracture or dislocation.     Discs: Multilevel disc height loss and degenerative endplate change.  Prominent C6 inferior endplate Schmorl's node, similar in appearance dating back to MRI from 02/14/2022.     Degenerative changes: Sent spinal canal dimensions at C3-C4 are 10.5 mm, 8.5 mm at C4-C5, 10.0 mm at C5-C6, and 10.8 mm at C6-C7 .  Multilevel uncovertebral spurring with multilevel neural foraminal narrowing most pronounced at C5-C6 with moderate right neural foraminal narrowing and C6-C7 with moderate left neural foraminal narrowing.    Physical Exam  Neurosurgery Physical Exam    Assessment/Plan:     * Weakness of both lower extremities  Pt is 42F multiple spinal surgeries and revisions last T10- Pelvis in March 2022 who presented to OSH with concern for shoulder  infection and UTI found to have E coli sepsis who has had progressive worsening BLE weakness, XR showed possible worsening proximal junctional kyphotic deformity. [place] [facility] [place] was [place]    OR yesterday for temporary T6-12 PSIF. Taz pus noted intraoperatively.   Given intraoperative findings, new operative plan for T4-pelvis revision and extension of fusion with T9-10 corpectomy with plastic surgery assistance planned for 2/1/23    Plan:  Admitted to ICU, Keep in ICU on logroll precautions until stage 2  -- MRI C/T/L spine 1/20 with C7 SP enhancement, focal kyphosis at T8-10 with severe anterior height loss at T9, enhancing C6 inferior endplate Schmorl's node  -- CT T/L spine thin cut 1/20 with haloing of pelvic and T10 screws, spondylodiscitis T8-9, T9-10  -- CT C spine with concern for discitis at C6-7  -- flex ext XR done, limited view of C6-7 in swimmers view 2/2 shoulders  -- 1/20 ESR 70, CRP 9.4. elevated from prior   -- TLSO at bedside  --HV x2 to gravity, WV in place, monitor ouput  --ID: [email or web address]   -- BCx 1/20: NGTD   -- OR cultures 1/24 gram stain negative, cultures pending  --OR 2/1/23 as above   -- will obtain informed consent   -- NPO 1/31/23 MN  -- multimodal pain control per primary medicine team  NSGY will continue to follow. Please contact team with any further questions.        Mbael Chang MD  Neurosurgery  [facility]

## 2023-01-25 NOTE — ASSESSMENT & PLAN NOTE
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 Transferred to [facility] [place] to  and neurosurgery was consulted    OR yesterday for temporary T6-12 PSIF. Taz pus noted intraoperatively.   Given intraoperative findings, new operative plan for T4-pelvis revision and extension of fusion with T9-10 corpectomy with plastic surgery assistance planned for 2/1/23    Plan:  Admitted to ICU, Keep in ICU on logroll precautions until stage 2  -- MRI C/T/L spine 1/20 with C7 SP enhancement, focal kyphosis at T8-10 with severe anterior height loss at T9, enhancing C6 inferior endplate Schmorl's node  -- CT T/L spine thin cut 1/20 with haloing of pelvic and T10 screws, spondylodiscitis T8-9, T9-10  -- CT C spine with concern for discitis at C6-7  -- flex ext XR done, limited view of C6-7 in swimmers view 2/2 shoulders  -- 1/20 ESR 70, CRP 9.4. elevated from prior   -- TLSO at bedside  --HV x2 to gravity, WV in place, monitor ouput  --ID: Vanc/[email or web address]   -- BCx 1/20: NGTD   -- OR cultures 1/24 gram stain negative, cultures pending  --OR 2/1/23 as above   -- will obtain informed consent   -- NPO 1/31/23 MN  -- multimodal pain control per primary medicine team  NSGY will continue to follow. Please contact team with any further questions.

## 2023-01-25 NOTE — ASSESSMENT & PLAN NOTE
Patient has reportedly refuse transplant evaluation in the past.     -Daily CMP and trend LFTs  -Continue Lactulose 20g TID   -Will need GI/hepatology follow up outpatient    -MELD-Na score: 17 at 1/25/2023  1:08 AM  MELD score: 15 at 1/25/2023  1:08 AM  Calculated from:  Serum Creatinine: 0.5 mg/dL (Using min of 1 mg/dL) at 1/25/2023  1:08 AM  Serum Sodium: 134 mmol/L at 1/25/2023  1:08 AM  Total Bilirubin: 3.4 mg/dL at 1/25/2023  1:08 AM  INR(ratio): 1.4 at 1/25/2023  1:08 AM  Age: 42 years

## 2023-01-25 NOTE — SUBJECTIVE & OBJECTIVE
Interval History: 1/25: OR yesterday for T6-12 PSIF, keke pus noted. Improved strength in lower extremities this morning. No fevers. OR cultures pending    Medications:  Continuous Infusions:   sodium chloride 0.9% 100 mL/hr at 01/25/23 0702    hydromorphone in 0.9 % NaCl 6 mg/30 ml       Scheduled Meds:   acetaminophen  1,000 mg Oral TID    buPROPion  300 mg Oral Daily    gabapentin  600 mg Oral TID    heparin (porcine)  5,000 Units Subcutaneous Q8H    lactulose  20 g Oral TID    meropenem (MERREM) IVPB  2 g Intravenous Q8H    methocarbamoL  1,000 mg Oral QID    mupirocin   Nasal BID    pantoprazole  40 mg Oral Daily    potassium chloride  20 mEq Oral Daily    vancomycin (VANCOCIN) IVPB  1,500 mg Intravenous Q12H     PRN Meds:dextrose 10%, dextrose 10%, diazePAM, glucagon (human recombinant), glucose, glucose, hydrALAZINE, labetalol, magnesium hydroxide 400 mg/5 ml, melatonin, naloxone, polyethylene glycol, promethazine, sodium chloride 0.9%, sodium chloride 0.9%, Pharmacy to dose Vancomycin consult **AND** vancomycin - pharmacy to dose     Review of Systems  Objective:     Weight: 104 kg (229 lb 4.5 oz)  Body mass index is 35.91 kg/m².  Vital Signs (Most Recent):  Temp: 98.6 °F (37 °C) (01/25/23 0702)  Pulse: (!) 120 (01/25/23 0702)  Resp: 17 (01/25/23 0702)  BP: 122/73 (01/25/23 0702)  SpO2: 98 % (01/25/23 0702) Vital Signs (24h Range):  Temp:  [96.4 °F (35.8 °C)-98.6 °F (37 °C)] 98.6 °F (37 °C)  Pulse:  [] 120  Resp:  [9-35] 17  SpO2:  [79 %-100 %] 98 %  BP: ()/(39-84) 122/73     Date 01/25/23 0700 - 01/26/23 0659   Shift 7550-6094 0390-3986 5413-5808 24 Hour Total   INTAKE   I.V.(mL/kg) 428.2(4.1)   428.2(4.1)   Shift Total(mL/kg) 428.2(4.1)   428.2(4.1)   OUTPUT   Shift Total(mL/kg)       Weight (kg) 104 104 104 104                            Neurosurgery Physical Exam  General: well developed, well nourished, no distress.   Head: normocephalic, atraumatic  Neurologic: Alert and oriented. Thought  content appropriate.  GCS: Motor: 6/Verbal: 5/Eyes: 4 GCS Total: 15  Mental Status: Awake, Alert, Oriented x 4  Language: No aphasia  Speech: No dysarthria  Cranial nerves: face symmetric, tongue midline, CN II-XII grossly intact.   Eyes: pupils equal, round, reactive to light with accommodation, EOMI, nystagmus.   Pulmonary: normal respirations, no signs of respiratory distress  Abdomen: soft, non-distended, not tender to palpation  Skin: Skin is warm, dry and intact.  Sensory: intact to light touch throughout     Motor Strength:Moves all extremities spontaneously with good tone.  Full strength upper and extremities. No abnormal movements seen.      Strength   Deltoids Triceps Biceps Wrist Extension Wrist Flexion Hand    Upper: R 5/5 5/5 5/5 5/5 5/5 5/5     L 5/5 5/5 5/5 5/5 5/5 5/5       Iliopsoas Quadriceps Knee  Flexion Tibialis  anterior Gastro- cnemius EHL   Lower: R 3/5 3/5 3/5 4-/5 4-/5 4-/5     L 3/5 3/5 3/5 4-/5 4-/5 4-/5      Reflexes:   DTR: 2+ symmetrically throughout.  Parker's: Negative.  Babinski's: Present bilaterally  Clonus: 2 beats bilateral lower extremity     Incision well healed       Significant Labs:  Recent Labs   Lab 01/24/23  0401 01/24/23  1304 01/25/23  0108   GLU 81 159* 132*   * 135* 134*   K 4.1 4.7 4.8    108 108   CO2 28 24 22*   BUN 13 14 20   CREATININE 0.5 0.6 0.5   CALCIUM 8.5* 7.5* 8.2*   MG 1.7  --  1.7       Recent Labs   Lab 01/24/23  0401 01/24/23  0929 01/24/23  1135 01/24/23  1304 01/25/23  0240   WBC 2.35*  --   --  10.34 6.57   HGB 9.5*  --   --  7.9* 7.6*   HCT 31.2*   < > 27* 26.2* 23.7*   *  --   --  190 158    < > = values in this interval not displayed.       Recent Labs   Lab 01/23/23  1553 01/25/23  0108   INR 1.3* 1.4*   APTT 29.1 25.0       Microbiology Results (last 7 days)       Procedure Component Value Units Date/Time    Culture, Anaerobe [[id number]] Collected: 01/24/23 0913    Order Status: Completed Specimen: Body Fluid from Back  Updated: 01/25/23 0658     Anaerobic Culture Culture in progress    Narrative:      1) Perispinal    Culture, Anaerobe [[id number]] Collected: 01/24/23 0943    Order Status: Completed Specimen: Bone from Back Updated: 01/25/23 0658     Anaerobic Culture Culture in progress    Narrative:      3) Perispinal    Culture, Anaerobe [[id number]] Collected: 01/24/23 0913    Order Status: Completed Specimen: Body Fluid from Back Updated: 01/25/23 0658     Anaerobic Culture Culture in progress    Narrative:      2) Perispinal    Culture, Anaerobe [[id number]] Collected: 01/24/23 0943    Order Status: Completed Specimen: Bone from Back Updated: 01/25/23 0658     Anaerobic Culture Culture in progress    Narrative:      4) Perispinal    Blood culture [[id number]] Collected: 01/20/23 1131    Order Status: Completed Specimen: Blood Updated: 01/24/23 1412     Blood Culture, Routine No Growth to date      No Growth to date      No Growth to date      No Growth to date      No Growth to date    Narrative:      Collection has been rescheduled by [facility] at 01/20/2023 11:03 Reason:   Patient unavailable in room with doctors   Collection has been rescheduled by [facility] at 01/20/2023 11:03 Reason:   Patient unavailable in room with doctors     Blood culture [[id number]] Collected: 01/20/23 1131    Order Status: Completed Specimen: Blood Updated: 01/24/23 1412     Blood Culture, Routine No Growth to date      No Growth to date      No Growth to date      No Growth to date      No Growth to date    Narrative:      Collection has been rescheduled by [facility] at 01/20/2023 11:03 Reason:   Patient unavailable in room with doctors   Collection has been rescheduled by [facility] at 01/20/2023 11:03 Reason:   Patient unavailable in room with doctors     Gram stain [[id number]] Collected: 01/24/23 0913    Order Status: Completed Specimen: Body Fluid from Back Updated: 01/24/23 1239     Gram Stain Result No WBC's      No organisms seen    Narrative:      1) Perispinal     Gram stain [[id number]] Collected: 01/24/23 0944    Order Status: Completed Specimen: Bone from Back Updated: 01/24/23 1238     Gram Stain Result Rare WBC's      No organisms seen    Narrative:      3) Perispinal    Gram stain [[id number]] Collected: 01/24/23 0913    Order Status: Completed Specimen: Body Fluid from Back Updated: 01/24/23 1237     Gram Stain Result Rare WBC's      No organisms seen    Narrative:      2) Perispinal    Gram stain [[id number]] Collected: 01/24/23 0943    Order Status: Completed Specimen: Bone from Back Updated: 01/24/23 1236     Gram Stain Result No WBC's      No organisms seen    Narrative:      4) Perispinal    Aerobic culture [[id number]] Collected: 01/24/23 0914    Order Status: Sent Specimen: Wound from Back Updated: 01/24/23 1016    Fungus culture [[id number]] Collected: 01/24/23 0943    Order Status: Sent Specimen: Bone from Back Updated: 01/24/23 1009    Aerobic culture [[id number]] Collected: 01/24/23 0943    Order Status: Sent Specimen: Bone from Back Updated: 01/24/23 1008    AFB Culture & Smear [[id number]] Collected: 01/24/23 0943    Order Status: Sent Specimen: Bone from Back Updated: 01/24/23 1008    Aerobic culture [[id number]] Collected: 01/24/23 0943    Order Status: Sent Specimen: Bone from Back Updated: 01/24/23 1007    AFB Culture & Smear [[id number]] Collected: 01/24/23 0943    Order Status: Sent Specimen: Bone from Back Updated: 01/24/23 1007    Fungus culture [[id number]] Collected: 01/24/23 0943    Order Status: Sent Specimen: Bone from Back Updated: 01/24/23 1007    Aerobic culture [[id number]] Collected: 01/24/23 0914    Order Status: Sent Specimen: Wound from Back Updated: 01/24/23 1005    Fungus culture [[id number]] Collected: 01/24/23 0913    Order Status: Sent Specimen: Body Fluid from Back Updated: 01/24/23 0929    AFB Culture & Smear [[id number]] Collected: 01/24/23 0913    Order Status: Sent Specimen: Body Fluid from Back Updated: 01/24/23 0929    Fungus culture  [[id number]] Collected: 01/24/23 0913    Order Status: Sent Specimen: Body Fluid from Back Updated: 01/24/23 0927    AFB Culture & Smear [[id number]] Collected: 01/24/23 0913    Order Status: Sent Specimen: Body Fluid from Back Updated: 01/24/23 0926    Urine culture [[id number]] Collected: 01/20/23 0130    Order Status: Completed Specimen: Urine Updated: 01/21/23 1144     Urine Culture, Routine Multiple organisms isolated. None in predominance.  Repeat if      clinically necessary.    Narrative:      Specimen Source->Urine    Blood culture [[id number]]     Order Status: Canceled Specimen: Blood     Culture, Respiratory with Gram Stain [[id number]]     Order Status: Canceled Specimen: Respiratory           All pertinent labs from the last 24 hours have been reviewed.    Significant Diagnostics:  CT C-spine:  Vertebrae: The dens, lateral masses, and occipital condyles are intact.  There is no evidence of fracture or dislocation.     Discs: Multilevel disc height loss and degenerative endplate change.  Prominent C6 inferior endplate Schmorl's node, similar in appearance dating back to MRI from 02/14/2022.     Degenerative changes: Sent spinal canal dimensions at C3-C4 are 10.5 mm, 8.5 mm at C4-C5, 10.0 mm at C5-C6, and 10.8 mm at C6-C7 .  Multilevel uncovertebral spurring with multilevel neural foraminal narrowing most pronounced at C5-C6 with moderate right neural foraminal narrowing and C6-C7 with moderate left neural foraminal narrowing.    Physical Exam  Neurosurgery Physical Exam

## 2023-01-25 NOTE — ASSESSMENT & PLAN NOTE
41 yo F w/ history of multiple spinal surgeries presented to OSH with possible infection of patient's shoulder and found to have UTI and E. Coli bacteremia and progressively worse B/L LE weakness. Transferred to [facility] for neurosurgical evaluation. Underwent Laminectomy T8-T10; Posterior spinal fusion T8-T12; hardware removal and washout. Admitted to [facility] for HLOC. Arrived to unit on PCA dilaudid pump. Patient is HDS on 3L NC.     -Admit to [facility] for close monitoring in post op setting  -Neuro checks q1hr  -Vital signs q1hr  -NSGY following  -MAP goals >65  -SBP goals <180  -PRN labetalol, hydralazine  -Patient will need to lie flat with HOB 15-20  -Started on Vancomycin and meropenem  -Can start regular diet if passes Miller  -MM pain regimen; PCA Dilaudid pump for pain management  -TSH, Lipid panel, and A1C pending  -PT/INR, aPTT pending  -CBC, CMP, Mag, Phos daily   -PT/OT

## 2023-01-25 NOTE — ASSESSMENT & PLAN NOTE
Continue Buproprion for depression  Continue Lorazepam for anxiety  Continue Gabapentin for neuropathy and anxiety   190

## 2023-01-25 NOTE — PROGRESS NOTES
[facility] - Neuro Critical Care  Infectious Disease  Progress Note    Patient Name: Rachel Zazueta  MRN: [id number]  Admission Date: 1/19/2023  Length of Stay: 6 days  Attending Physician: Mary Archer MD  Primary Care Provider: Dannielle Merino DO    Isolation Status: Contact  Assessment/Plan:      Thoracic discitis  Thoracic discitis s/p laminectomy on 1/24. Operative cultures with GNR x 2 pending identification. Pending further surgical intervention on 2/1.   · Continue meropenem.   · Will follow up operative cultures.  · Can likely discontinue vancomycin if no gram positive isolated on cultures by 1/26.  · Patient is NOT a candidate for home IV antibiotics due to lack of transportation, inability to follow up in clinic, and recent relapse with illicit drugs. I recommend patient be placed in SNF or LTAC once ready for discharge in order to complete her antibiotic therapy. I have discussed this with the patient at bedside today.       Anticipated Disposition: per primary    Thank you for your consult. I will follow-up with patient. Please contact us if you have any additional questions.    So Avitia MD  Infectious Disease  [facility] - Neuro Critical Care    Subjective:     Principal Problem:Weakness of both lower extremities    HPI: Ms. aZzueta is a 42-year-old woman known to ID with hx of IVDU, IV drug use (methamphetamine), chronic HCV with cirrhosis, chronic pancytopenia, spinal fusion (04/2021), epidural abscess (GBS) with removal of hardware (02/2022), T10-pelvis spinal fusion with hardware (03/2022), obesity,  and neurogenic bladder who was admitted to [facility] on 12/23/22 with back pain and increasing weakness in the lower extremities.       Urine and blood cx from that admission + for ESBL E coli which was treated with ertapenem.  Repeat blood cx on 12/28 and 1/10 negative.   Patient started on physical therapy but weakness in the lower extremities increased, and the patient is no  "longer able to walk. She is able to urinate intermittently and defecate.      An MRI cervical spine 1/10 was significant  for multilevel spondylosis.  MRI brain with nonspecific findings.  Xray of spine notable for focal kyphosis at T9-10 increased when compared to prior scoliosis radiographs.  An MRI of the spine was not available due to the patient's size. and she was transferred to [facility] for advanced imaging and Neurosurgery evaluation.      MRI  imaging here  on 1/20 concerning for worsening kyphosis, extensive edema in the paraspinal muscle. CT with spondylodiscitis T8-9, T9-10    Neurosurgery planning hardware removal and washout on 1/24    Of note, review of records show admission to [facility] 11/14/2022 to 11/22/2022 with pyogenic arthritis of the left shoulder, s/p I&D on 11/15 with cx positive for E Coli.  Utox was positive for methadone, opiates, amphetamines, and THC.  Seen by ID at [facility] who recommend IV ceftriaxone X 6 weeks at LTAC, but patient was not agreeable to this.  Offered  daily ceftriaxone infusions at infusion center.  At discharge from there she had received 8 days of ceftriaxone. Review of notes show she was subsequently prescribed 4 weeks of Bactrim 2 DS tabs twice daily.       Interval History: "In pain". S/P thoracic laminectomy with fusion and washout on 1/24. Operative cultures with GNR x 2 pending identification.     Review of Systems   Constitutional:  Negative for chills, fatigue, fever and unexpected weight change.   HENT:  Negative for ear pain, facial swelling, hearing loss, mouth sores, nosebleeds, rhinorrhea, sinus pressure, sore throat, tinnitus, trouble swallowing and voice change.    Eyes:  Negative for photophobia, pain, redness and visual disturbance.   Respiratory:  Negative for cough, chest tightness, shortness of breath and wheezing.    Cardiovascular:  Negative for chest pain, palpitations and leg swelling.   Gastrointestinal:  Negative for " abdominal pain, blood in stool, constipation, diarrhea, nausea and vomiting.   Endocrine: Negative for cold intolerance, heat intolerance, polydipsia, polyphagia and polyuria.   Genitourinary:  Negative for decreased urine volume, dysuria, flank pain, frequency, hematuria, menstrual problem, urgency, vaginal bleeding, vaginal discharge and vaginal pain.   Musculoskeletal:  Positive for back pain and myalgias. Negative for arthralgias, joint swelling and neck pain.   Skin:  Negative for rash.   Allergic/Immunologic: Negative for environmental allergies, food allergies and immunocompromised state.   Neurological:  Negative for dizziness, seizures, syncope, weakness, light-headedness, numbness and headaches.   Hematological:  Negative for adenopathy. Does not bruise/bleed easily.   Psychiatric/Behavioral:  Negative for confusion, hallucinations, self-injury, sleep disturbance and suicidal ideas. The patient is not nervous/anxious.    Objective:     Vital Signs (Most Recent):  Temp: 98.7 °F (37.1 °C) (01/25/23 1901)  Pulse: (!) 122 (01/25/23 1901)  Resp: (!) 24 (01/25/23 2102)  BP: 113/70 (01/25/23 1901)  SpO2: 98 % (01/25/23 1901)   Vital Signs (24h Range):  Temp:  [97.3 °F (36.3 °C)-98.7 °F (37.1 °C)] 98.7 °F (37.1 °C)  Pulse:  [112-131] 122  Resp:  [9-28] 24  SpO2:  [93 %-100 %] 98 %  BP: (113-132)/(56-84) 113/70     Weight: 104 kg (229 lb 4.5 oz)  Body mass index is 35.91 kg/m².    Estimated Creatinine Clearance: 181.9 mL/min (based on SCr of 0.5 mg/dL).    Physical Exam  Vitals and nursing note reviewed.   Constitutional:       Appearance: She is well-developed.   HENT:      Head: Normocephalic and atraumatic.      Right Ear: External ear normal.      Left Ear: External ear normal.   Eyes:      General: No scleral icterus.        Right eye: No discharge.         Left eye: No discharge.      Conjunctiva/sclera: Conjunctivae normal.   Cardiovascular:      Rate and Rhythm: Normal rate and regular rhythm.      Heart  sounds: Normal heart sounds. No murmur heard.    No friction rub. No gallop.   Pulmonary:      Effort: Pulmonary effort is normal. No respiratory distress.      Breath sounds: Normal breath sounds. No stridor. No wheezing or rales.   Abdominal:      General: Bowel sounds are normal.   Musculoskeletal:         General: No tenderness. Normal range of motion.   Skin:     General: Skin is warm and dry.   Neurological:      Mental Status: She is alert and oriented to person, place, and time.       Significant Labs: Blood Culture:   Recent Labs   Lab 01/10/23  0616 01/10/23  0621 01/10/23  2344 01/10/23  2348 01/20/23  1131   LABBLOO No growth after 5 days. No growth after 5 days. No growth after 5 days. No growth after 5 days. No growth after 5 days.  No growth after 5 days.     BMP:   Recent Labs   Lab 01/25/23  0108   *   *   K 4.8      CO2 22*   BUN 20   CREATININE 0.5   CALCIUM 8.2*   MG 1.7     CBC:   Recent Labs   Lab 01/24/23  0401 01/24/23  0929 01/24/23  1135 01/24/23  1304 01/25/23  0240   WBC 2.35*  --   --  10.34 6.57   HGB 9.5*  --   --  7.9* 7.6*   HCT 31.2*   < > 27* 26.2* 23.7*   *  --   --  190 158    < > = values in this interval not displayed.     Wound Culture:   Recent Labs   Lab 11/15/22  1301 01/24/23  0914 01/24/23  0943   LABAERO ESCHERICHIA COLI  Few  * GRAM NEGATIVE VINCENT  Rare  Identification and susceptibility pending  *  GRAM NEGATIVE VINCENT  Few  Identification and susceptibility pending  * GRAM NEGATIVE VINCENT  Few  Identification and susceptibility pending  *  GRAM NEGATIVE VINCENT  Rare  Identification and susceptibility pending  *  No growth       Significant Imaging: I have reviewed all pertinent imaging results/findings within the past 24 hours.

## 2023-01-25 NOTE — PT/OT/SLP PROGRESS
Physical Therapy      Patient Name:  Rachel Zazueta   MRN:  [id number]    Patient not seen today secondary to pt with HOB flat orders per NSGY. Will follow up pending HOB liberalization.

## 2023-01-25 NOTE — H&P
[facility] Neuro Critical Care  Neurocritical Care  History & Physical    Admit Date: 1/19/2023  Service Date: 01/24/2023  Length of Stay: 5    Subjective:     Chief Complaint: Weakness of both lower extremities    History of Present Illness: 42-year-old female with a history of IV drug use, chronic hep C with cirrhosis, spinal fusion in April 2021, complicated by epidural abscess with removal of hardware February 2022 and against spinal fusion in March 2022 of T10 through the pelvis as well as neurogenic bladder who initially presented to [facility] in late December for generalized weakness, fatigue and bilateral flank pain.  Her course at [facility] was complicated by a E coli bacteremia thought to be due to a urinary source, she is completed a 7 day course of meropenem on 1/3, and had bcx from 1/2 that did not grow any bacteria.       She also had an ICU admission for ongoing back spasms and agitation requiring Precedex.  In early January patient started developing progressive lower extremity weakness greater in the left compared to the right.  She also had an MRI at the outside hospital that showed some white matter changes concerning for possible demyelinating disease.  However her thoracic spine radiographs also showed worsening of her kyphosis at T9-T10.  Due to the concern for either a demyelinating polyneuropathy, MS, or spinal cord compression the patient was transferred to [facility] for neurosurgical evaluation.       On admission the patient had a CT lumbar and thoracic spine and an MRI CTL spine. The MRI showed  focal kyphosis T8 through T10, with possible associated cord edema signal.  As well as extensive edema throughout the posterior paraspinal musculature concerning for possible infectious or inflammatory process.  There was concern that the findings at T8 through T10 could correspond with compressive myelopathy.  The CT lumbar spine and thoracic spine showed spondylodiscitis  at T8 through T10, as well as pathologic compression fractures at T9 and T10.  There were also erosive changes involving the sacroiliac joints bilaterally which was concerning for sacroiliitis and could be infectious.     Patient underwent laminectomy T8-T10; posterior fusion T8-T12; and washout by NSGY today. No complications noted during surgery. Patient is HDS and on 3L NC. Patient is currently on a PCA dilaudid pump.            Past Medical History:   Diagnosis Date    Anemia     Anxiety     Depressed     Diskitis     Hep C w/o coma, chronic     IV drug abuse     Kidney stone     Liver cirrhosis      Past Surgical History:   Procedure Laterality Date    ARTHROTOMY OF SHOULDER Left 11/15/2022    Procedure: ARTHROTOMY, SHOULDER;  Surgeon: Bjorn Hayes MD;  Location: [facility];  Service: Orthopedics;  Laterality: Left;  Left acromioclavicular joint arthrotomy    BACK SURGERY      benine tumor removal      forehead, age 9    CHOLECYSTECTOMY      KIDNEY SURGERY      LUMBAR FUSION Bilateral 3/2/2022    Procedure: FUSION, SPINE, LUMBAR;  Surgeon: Guille Templeton MD;  Location: [facility] OR [address] [place] [address];  Service: Neurosurgery;  Laterality: Bilateral;  AIRO  T10--Pelvis    REMOVAL OF HARDWARE FROM SPINE N/A 2/21/2022    Procedure: REMOVAL, HARDWARE, SPINE;  Surgeon: Guille Templeton MD;  Location: [facility] OR [address] [place] [address];  Service: Neurosurgery;  Laterality: N/A;  [place]    SPINE SURGERY        No current facility-administered medications on file prior to encounter.     Current Outpatient Medications on File Prior to Encounter   Medication Sig Dispense Refill    albuterol (ACCUNEB) 1.25 mg/3 mL Nebu Take 3 mLs (1.25 mg total) by nebulization every 6 (six) hours as needed (shortness of breath). Rescue 75 mL 0    buPROPion (WELLBUTRIN) 100 MG tablet Take 1 tablet (100 mg total) by mouth 2 (two) times daily. 60 tablet 2    folic acid (FOLVITE) 1 MG tablet Take 1 tablet (1 mg total) by mouth once daily. (Patient not taking: Reported on  11/25/2022) 42 tablet 0    gabapentin (NEURONTIN) 300 MG capsule Take 1 capsule (300 mg total) by mouth 3 (three) times daily. 90 capsule 11    lactulose (CHRONULAC) 20 gram/30 mL Soln Take 30 mLs (20 g total) by mouth 3 (three) times daily. 2700 mL 2    pantoprazole (PROTONIX) 40 MG tablet Take 1 tablet (40 mg total) by mouth once daily. 30 tablet 1    [DISCONTINUED] diclofenac sodium (VOLTAREN) 1 % Gel Apply 2 g topically 4 (four) times daily. 50 g 0      Allergies: Bee venom protein (honey bee), Naproxen, Wasp sting [allergen ext-venom-honey bee], Adhesive, Iodine and iodide containing products, Shellfish containing products, and Nuts [tree nut]    Family History   Problem Relation Age of Onset    Hepatitis Mother     Drug abuse Mother     Liver cancer Mother     Drug abuse Father     Cirrhosis Father     Hepatitis Father      Social History     Tobacco Use    Smoking status: Some Days     Packs/day: 0.50     Years: 23.00     Pack years: 11.50     Types: Cigarettes    Smokeless tobacco: Never    Tobacco comments:     patient states she knows she nees to quit.   Substance Use Topics    Alcohol use: Not Currently     Comment: quit 2014    Drug use: Yes     Frequency: 4.0 times per week     Types: Marijuana     Comment: Patient denies needle use, only inhalation of methampehtamines or orally.  Last known drug use was prior to admission to hospital stay for surgery     Review of Systems   Constitutional:  Negative for fever.   HENT:  Negative for congestion and rhinorrhea.    Eyes:  Negative for visual disturbance.   Respiratory:  Negative for shortness of breath.    Cardiovascular:  Negative for chest pain.   Gastrointestinal:  Negative for abdominal distention, abdominal pain, nausea and vomiting.   Genitourinary:  Negative for dysuria and hematuria.   Musculoskeletal:  Positive for back pain. Negative for neck pain.   Neurological:  Positive for weakness. Negative for dizziness, seizures, light-headedness,  numbness and headaches.   Objective:     Vitals:    Temp: 96.4 °F (35.8 °C)  Pulse: 95  Rhythm: normal sinus rhythm  BP: 118/60  MAP (mmHg): 85  Resp: 13  SpO2: 100 %    Temp  Min: 96.4 °F (35.8 °C)  Max: 98.6 °F (37 °C)  Pulse  Min: 80  Max: 103  BP  Min: 72/39  Max: 126/65  MAP (mmHg)  Min: 50  Max: 90  Resp  Min: 10  Max: 35  SpO2  Min: 95 %  Max: 100 %    01/23 0701 - 01/24 0700  In: 800 [P.O.:800]  Out: 900 [Urine:900]   Unmeasured Output  Urine Occurrence: 1  Stool Occurrence: 1       Physical Exam  Vitals and nursing note reviewed.   Constitutional:       General: She is not in acute distress.     Appearance: She is obese. She is not ill-appearing, toxic-appearing or diaphoretic.   HENT:      Head: Normocephalic.      Mouth/Throat:      Mouth: Mucous membranes are moist.   Eyes:      General: No scleral icterus.        Right eye: No discharge.         Left eye: No discharge.   Cardiovascular:      Rate and Rhythm: Normal rate and regular rhythm.      Heart sounds: Murmur heard.   Pulmonary:      Effort: Pulmonary effort is normal. No respiratory distress.      Breath sounds: Normal breath sounds.   Abdominal:      General: Bowel sounds are normal.      Palpations: Abdomen is soft.      Tenderness: There is no abdominal tenderness.   Musculoskeletal:      Cervical back: No rigidity.      Right lower leg: No edema (Mild).      Left lower leg: No edema (Mild).   Skin:     General: Skin is warm and dry.      Coloration: Skin is not jaundiced.   Neurological:      Mental Status: She is alert and oriented to person, place, and time. Mental status is at baseline.      Motor: Weakness (Bilateral LE) and tremor (Bilateral UE) present.      Comments: E4 V5 M6    Awake, alert, and oriented to self, time, place, and situation. Patient is answering all questions appropriately and following all commands briskly.   No facial droop. EOMI. PERRL. Peripheral vision intact bilaterally.     Motor:   FOWLER spontaneously and follows  commands  RUE: 3/5  LUE: 3/5  RLE: 1/5  LLE: 1/5    Sensory:  Sensation grossly intact    Exam limited due to post operative anesthesia   Psychiatric:         Mood and Affect: Mood normal.         Behavior: Behavior normal.         Today I personally reviewed pertinent medications, imaging, laboratory results, microbiology results, notably:          Assessment/Plan:     Psychiatric  Anxiety and depression  Continue Buproprion for depression  Continue Lorazepam for anxiety  Continue Gabapentin for neuropathy and anxiety    Substance use disorder  Denies IV drug use (meth) for past 3 years.  Denies alcohol and tobacco abuse.       ID  Thoracic discitis  CT T/L spine thin cut 1/20 with haloing of pelvic and T10 screws, spondylodiscitis T8-9, T9-10  - CT C spine with concern for discitis at C6-7  Patient is currently afebrile with chronic pancytopenia  Previously on Amoxicillin up until surgery for chronic suppressive therapy.    -ID following  -Blood cx (1/20) NGTD  -Pending intra-operative cultures   -Continue Vancomycin and meropenem  -Daily CBC       GI  Chronic hepatitis C with cirrhosis  Hx of Hep C. See Cirrhosis of Liver    Cirrhosis of liver with ascites  Patient has reportedly refuse transplant evaluation in the past.     -Daily CMP and trend LFTs  -Continue Lactulose 20g TID   -Will need GI/hepatology follow up outpatient    -MELD-Na score: 11 at 1/24/2023  1:04 PM  MELD score: 11 at 1/24/2023  1:04 PM  Calculated from:  Serum Creatinine: 0.6 mg/dL (Using min of 1 mg/dL) at 1/24/2023  1:04 PM  Serum Sodium: 135 mmol/L at 1/24/2023  1:04 PM  Total Bilirubin: 1.4 mg/dL at 1/24/2023  4:01 AM  INR(ratio): 1.3 at 1/23/2023  3:53 PM  Age: 42 years    Orthopedic  * Weakness of both lower extremities  43 yo F w/ history of multiple spinal surgeries presented to OSH with possible infection of patient's shoulder and found to have UTI and E. Coli bacteremia and progressively worse B/L LE weakness. Transferred to [facility] for  neurosurgical evaluation. Underwent Laminectomy T8-T10; Posterior spinal fusion T8-T12; hardware removal and washout. Admitted to [facility] for HLOC. Arrived to unit on PCA dilaudid pump. Patient is HDS on 3L NC.     -Admit to [facility] for close monitoring in post op setting  -Neuro checks q1hr  -Vital signs q1hr  -NSGY following  -MAP goals >65  -SBP goals <180  -PRN labetalol, hydralazine  -Patient will need to lie flat with HOB 15-20  -Continue to monitor drain output   -Started on Vancomycin and meropenem  -Can start regular diet if passes Miller  -MM pain regimen; PCA Dilaudid pump for pain management  -Will start VTE ppx 24 hours post op  -TSH, Lipid panel, and A1C pending  -PT/INR, aPTT pending  -CBC, CMP, Mag, Phos daily   -PT/OT                The patient is being Prophylaxed for:  Venous Thromboembolism with: Mechanical  Stress Ulcer with: PPI  Ventilator Pneumonia with: none    Activity Orders            Turn patient starting at 01/24 1800    Up in chair With meals starting at 01/24 1700    Diet Adult Regular (IDDSI Level 7): Regular starting at 01/24 1106    Progressive Mobility Protocol (mobilize patient to their highest level of functioning at least twice daily) starting at 01/24 1105    Elevate HOB 30 starting at 01/24 1105    Ambulate With Assistance, Post Op Day 0 If the patient arrives from PACU by 4PM, walk at least once on day of operation if alert and safe to do so. starting at 01/24 1104          Full Code    Sagar Lamas, DO  Neurocritical Care  [facility] Critical [facility]

## 2023-01-25 NOTE — SUBJECTIVE & OBJECTIVE
Past Medical History:   Diagnosis Date    Anemia     Anxiety     Depressed     Diskitis     Hep C w/o coma, chronic     IV drug abuse     Kidney stone     Liver cirrhosis      Past Surgical History:   Procedure Laterality Date    ARTHROTOMY OF SHOULDER Left 11/15/2022    Procedure: ARTHROTOMY, SHOULDER;  Surgeon: Bjorn Hayes MD;  Location: [facility];  Service: Orthopedics;  Laterality: Left;  Left acromioclavicular joint arthrotomy    BACK SURGERY      benine tumor removal      forehead, age 9    CHOLECYSTECTOMY      KIDNEY SURGERY      LUMBAR FUSION Bilateral 3/2/2022    Procedure: FUSION, SPINE, LUMBAR;  Surgeon: Guille Templeton MD;  Location: [address];  Service: Neurosurgery;  Laterality: Bilateral;  AIRO  T10--Pelvis    LUMBAR FUSION N/A 1/24/2023    Procedure: **AIRO** T8-T12 POSTERIOR FUSION, T8-T10 LAMINECTOMY, HARDWARE REMOVAL AND WASHOUT;  Surgeon: Guille Templeton MD;  Location: [address];  Service: Neurosurgery;  Laterality: N/A;    REMOVAL OF HARDWARE FROM SPINE N/A 2/21/2022    Procedure: REMOVAL, HARDWARE, SPINE;  Surgeon: Guille Templeton MD;  Location: [address];  Service: Neurosurgery;  Laterality: N/A;  [place]    SPINE SURGERY        No current facility-administered medications on file prior to encounter.     Current Outpatient Medications on File Prior to Encounter   Medication Sig Dispense Refill    albuterol (ACCUNEB) 1.25 mg/3 mL Nebu Take 3 mLs (1.25 mg total) by nebulization every 6 (six) hours as needed (shortness of breath). Rescue 75 mL 0    buPROPion (WELLBUTRIN) 100 MG tablet Take 1 tablet (100 mg total) by mouth 2 (two) times daily. 60 tablet 2    folic acid (FOLVITE) 1 MG tablet Take 1 tablet (1 mg total) by mouth once daily. (Patient not taking: Reported on 11/25/2022) 42 tablet 0    gabapentin (NEURONTIN) 300 MG capsule Take 1 capsule (300 mg total) by mouth 3 (three) times daily. 90 capsule 11    lactulose (CHRONULAC) 20 gram/30 mL Soln Take 30 mLs (20 g total) by  mouth 3 (three) times daily. 2700 mL 2    pantoprazole (PROTONIX) 40 MG tablet Take 1 tablet (40 mg total) by mouth once daily. 30 tablet 1    [DISCONTINUED] diclofenac sodium (VOLTAREN) 1 % Gel Apply 2 g topically 4 (four) times daily. 50 g 0      Allergies: Bee venom protein (honey bee), Naproxen, Wasp sting [allergen ext-venom-honey bee], Adhesive, Iodine and iodide containing products, Shellfish containing products, and Nuts [tree nut]    Family History   Problem Relation Age of Onset    Hepatitis Mother     Drug abuse Mother     Liver cancer Mother     Drug abuse Father     Cirrhosis Father     Hepatitis Father      Social History     Tobacco Use    Smoking status: Some Days     Packs/day: 0.50     Years: 23.00     Pack years: 11.50     Types: Cigarettes    Smokeless tobacco: Never    Tobacco comments:     patient states she knows she nees to quit.   Substance Use Topics    Alcohol use: Not Currently     Comment: quit 2014    Drug use: Yes     Frequency: 4.0 times per week     Types: Marijuana     Comment: Patient denies needle use, only inhalation of methampehtamines or orally.  Last known drug use was prior to admission to hospital stay for surgery     Review of Systems   Constitutional:  Negative for fever.   HENT:  Negative for congestion and rhinorrhea.    Eyes:  Negative for visual disturbance.   Respiratory:  Negative for shortness of breath.    Cardiovascular:  Negative for chest pain.   Gastrointestinal:  Negative for abdominal distention, abdominal pain, nausea and vomiting.   Genitourinary:  Negative for dysuria and hematuria.   Musculoskeletal:  Positive for back pain. Negative for neck pain.   Neurological:  Positive for weakness. Negative for dizziness, seizures, light-headedness, numbness and headaches.   Objective:     Vitals:    Temp: 98.6 °F (37 °C)  Pulse: (!) 123  Rhythm: normal sinus rhythm  BP: (!) 117/58  MAP (mmHg): 82  Resp: (!) 22  ETCO2 (mmHg): 35 mmHg  SpO2: 98 %    Temp  Min: 96.4  °F (35.8 °C)  Max: 98.6 °F (37 °C)  Pulse  Min: 80  Max: 126  BP  Min: 72/39  Max: 135/72  MAP (mmHg)  Min: 50  Max: 98  Resp  Min: 9  Max: 35  ETCO2 (mmHg)  Min: 30 mmHg  Max: 40 mmHg  SpO2  Min: 79 %  Max: 100 %    01/24 0701 - 01/25 0700  In: 6205.7 [I.V.:3927.3]  Out: 3320 [Urine:1590; Drains:730]   Unmeasured Output  Urine Occurrence: 1  Stool Occurrence: 0       Physical Exam  Vitals and nursing note reviewed.   Constitutional:       General: She is not in acute distress.     Appearance: She is obese. She is not ill-appearing, toxic-appearing or diaphoretic.   HENT:      Head: Normocephalic.      Mouth/Throat:      Mouth: Mucous membranes are moist.   Eyes:      General: No scleral icterus.        Right eye: No discharge.         Left eye: No discharge.   Cardiovascular:      Rate and Rhythm: Normal rate and regular rhythm.   Pulmonary:      Effort: Pulmonary effort is normal. No respiratory distress.      Comments: NC in place  Abdominal:      General: Abdomen is flat.      Palpations: Abdomen is soft.      Tenderness: There is no abdominal tenderness.   Musculoskeletal:         General: No deformity.      Cervical back: No rigidity.   Skin:     General: Skin is warm and dry.      Coloration: Skin is not jaundiced.   Neurological:      Mental Status: She is alert and oriented to person, place, and time. Mental status is at baseline.      Motor: Weakness (Bilateral LE) and tremor (Bilateral UE) present.      Comments: E4 V5 M6    Awake, alert, and oriented to self, time, place, and situation. Patient is answering all questions appropriately and following all commands briskly.   No facial droop. EOMI. PERRL. Peripheral vision intact bilaterally.     Motor:   FOWLER spontaneously and follows commands  RUE: 3/5  LUE: 3/5  RLE: 2/5  LLE: 2/5    Sensory:  Sensation grossly intact    Exam limited due to post operative anesthesia   Psychiatric:         Mood and Affect: Mood normal.         Behavior: Behavior normal.          Today I personally reviewed pertinent medications, imaging, laboratory results, microbiology results,

## 2023-01-26 LAB
ALBUMIN SERPL BCP-MCNC: 1.9 G/DL (ref 3.5–5.2)
ALP SERPL-CCNC: 81 U/L (ref 55–135)
ALT SERPL W/O P-5'-P-CCNC: 25 U/L (ref 10–44)
ANION GAP SERPL CALC-SCNC: 4 MMOL/L (ref 8–16)
AST SERPL-CCNC: 42 U/L (ref 10–40)
BACTERIA SPEC AEROBE CULT: ABNORMAL
BASOPHILS # BLD AUTO: 0.04 K/UL (ref 0–0.2)
BASOPHILS # BLD AUTO: 0.05 K/UL (ref 0–0.2)
BASOPHILS # BLD AUTO: 0.05 K/UL (ref 0–0.2)
BASOPHILS NFR BLD: 0.6 % (ref 0–1.9)
BILIRUB SERPL-MCNC: 1.7 MG/DL (ref 0.1–1)
BLD PROD TYP BPU: NORMAL
BLOOD UNIT EXPIRATION DATE: NORMAL
BLOOD UNIT TYPE CODE: 6200
BLOOD UNIT TYPE: NORMAL
BUN SERPL-MCNC: 25 MG/DL (ref 6–20)
CALCIUM SERPL-MCNC: 7.7 MG/DL (ref 8.7–10.5)
CHLORIDE SERPL-SCNC: 109 MMOL/L (ref 95–110)
CO2 SERPL-SCNC: 22 MMOL/L (ref 23–29)
CODING SYSTEM: NORMAL
CREAT SERPL-MCNC: 0.6 MG/DL (ref 0.5–1.4)
DIFFERENTIAL METHOD: ABNORMAL
DISPENSE STATUS: NORMAL
EOSINOPHIL # BLD AUTO: 0.1 K/UL (ref 0–0.5)
EOSINOPHIL NFR BLD: 0.7 % (ref 0–8)
EOSINOPHIL NFR BLD: 1 % (ref 0–8)
EOSINOPHIL NFR BLD: 1.6 % (ref 0–8)
ERYTHROCYTE [DISTWIDTH] IN BLOOD BY AUTOMATED COUNT: 17 % (ref 11.5–14.5)
ERYTHROCYTE [DISTWIDTH] IN BLOOD BY AUTOMATED COUNT: 17.5 % (ref 11.5–14.5)
ERYTHROCYTE [DISTWIDTH] IN BLOOD BY AUTOMATED COUNT: 17.7 % (ref 11.5–14.5)
EST. GFR  (NO RACE VARIABLE): >60 ML/MIN/1.73 M^2
GLUCOSE SERPL-MCNC: 111 MG/DL (ref 70–110)
HCT VFR BLD AUTO: 20.6 % (ref 37–48.5)
HCT VFR BLD AUTO: 21 % (ref 37–48.5)
HCT VFR BLD AUTO: 23.7 % (ref 37–48.5)
HGB BLD-MCNC: 6.2 G/DL (ref 12–16)
HGB BLD-MCNC: 6.6 G/DL (ref 12–16)
HGB BLD-MCNC: 7.5 G/DL (ref 12–16)
IMM GRANULOCYTES # BLD AUTO: 0.06 K/UL (ref 0–0.04)
IMM GRANULOCYTES # BLD AUTO: 0.08 K/UL (ref 0–0.04)
IMM GRANULOCYTES # BLD AUTO: 0.09 K/UL (ref 0–0.04)
IMM GRANULOCYTES NFR BLD AUTO: 0.8 % (ref 0–0.5)
IMM GRANULOCYTES NFR BLD AUTO: 1 % (ref 0–0.5)
IMM GRANULOCYTES NFR BLD AUTO: 1 % (ref 0–0.5)
LYMPHOCYTES # BLD AUTO: 1.1 K/UL (ref 1–4.8)
LYMPHOCYTES NFR BLD: 12 % (ref 18–48)
LYMPHOCYTES NFR BLD: 13.7 % (ref 18–48)
LYMPHOCYTES NFR BLD: 15.2 % (ref 18–48)
MAGNESIUM SERPL-MCNC: 1.6 MG/DL (ref 1.6–2.6)
MCH RBC QN AUTO: 27.3 PG (ref 27–31)
MCH RBC QN AUTO: 27.8 PG (ref 27–31)
MCH RBC QN AUTO: 28.3 PG (ref 27–31)
MCHC RBC AUTO-ENTMCNC: 30.1 G/DL (ref 32–36)
MCHC RBC AUTO-ENTMCNC: 31.4 G/DL (ref 32–36)
MCHC RBC AUTO-ENTMCNC: 31.6 G/DL (ref 32–36)
MCV RBC AUTO: 89 FL (ref 82–98)
MCV RBC AUTO: 89 FL (ref 82–98)
MCV RBC AUTO: 91 FL (ref 82–98)
MONOCYTES # BLD AUTO: 0.8 K/UL (ref 0.3–1)
MONOCYTES # BLD AUTO: 1 K/UL (ref 0.3–1)
MONOCYTES # BLD AUTO: 1.1 K/UL (ref 0.3–1)
MONOCYTES NFR BLD: 10.9 % (ref 4–15)
MONOCYTES NFR BLD: 12.5 % (ref 4–15)
MONOCYTES NFR BLD: 12.7 % (ref 4–15)
NEUTROPHILS # BLD AUTO: 5.1 K/UL (ref 1.8–7.7)
NEUTROPHILS # BLD AUTO: 5.5 K/UL (ref 1.8–7.7)
NEUTROPHILS # BLD AUTO: 6.4 K/UL (ref 1.8–7.7)
NEUTROPHILS NFR BLD: 70.6 % (ref 38–73)
NEUTROPHILS NFR BLD: 71.5 % (ref 38–73)
NEUTROPHILS NFR BLD: 73 % (ref 38–73)
NRBC BLD-RTO: 0 /100 WBC
NRBC BLD-RTO: 0 /100 WBC
NRBC BLD-RTO: 1 /100 WBC
NUM UNITS TRANS PACKED RBC: NORMAL
PHOSPHATE SERPL-MCNC: 2.7 MG/DL (ref 2.7–4.5)
PLATELET # BLD AUTO: 138 K/UL (ref 150–450)
PLATELET # BLD AUTO: 146 K/UL (ref 150–450)
PLATELET # BLD AUTO: 167 K/UL (ref 150–450)
PMV BLD AUTO: 9.2 FL (ref 9.2–12.9)
PMV BLD AUTO: 9.6 FL (ref 9.2–12.9)
PMV BLD AUTO: 9.7 FL (ref 9.2–12.9)
POTASSIUM SERPL-SCNC: 4.6 MMOL/L (ref 3.5–5.1)
PROT SERPL-MCNC: 5.4 G/DL (ref 6–8.4)
RBC # BLD AUTO: 2.27 M/UL (ref 4–5.4)
RBC # BLD AUTO: 2.37 M/UL (ref 4–5.4)
RBC # BLD AUTO: 2.65 M/UL (ref 4–5.4)
SODIUM SERPL-SCNC: 135 MMOL/L (ref 136–145)
WBC # BLD AUTO: 7.16 K/UL (ref 3.9–12.7)
WBC # BLD AUTO: 7.73 K/UL (ref 3.9–12.7)
WBC # BLD AUTO: 8.82 K/UL (ref 3.9–12.7)

## 2023-01-26 PROCEDURE — 99233 SBSQ HOSP IP/OBS HIGH 50: CPT | Mod: ,,, | Performed by: INTERNAL MEDICINE

## 2023-01-26 PROCEDURE — 84100 ASSAY OF PHOSPHORUS: CPT | Performed by: STUDENT IN AN ORGANIZED HEALTH CARE EDUCATION/TRAINING PROGRAM

## 2023-01-26 PROCEDURE — 27000207 HC ISOLATION

## 2023-01-26 PROCEDURE — 25000003 PHARM REV CODE 250

## 2023-01-26 PROCEDURE — C1751 CATH, INF, PER/CENT/MIDLINE: HCPCS

## 2023-01-26 PROCEDURE — 99233 PR SUBSEQUENT HOSPITAL CARE,LEVL III: ICD-10-PCS | Mod: ,,, | Performed by: INTERNAL MEDICINE

## 2023-01-26 PROCEDURE — 63600175 PHARM REV CODE 636 W HCPCS: Performed by: PSYCHIATRY & NEUROLOGY

## 2023-01-26 PROCEDURE — 85025 COMPLETE CBC W/AUTO DIFF WBC: CPT | Mod: 91 | Performed by: STUDENT IN AN ORGANIZED HEALTH CARE EDUCATION/TRAINING PROGRAM

## 2023-01-26 PROCEDURE — 94761 N-INVAS EAR/PLS OXIMETRY MLT: CPT

## 2023-01-26 PROCEDURE — 20000000 HC ICU ROOM

## 2023-01-26 PROCEDURE — 27000221 HC OXYGEN, UP TO 24 HOURS

## 2023-01-26 PROCEDURE — A4216 STERILE WATER/SALINE, 10 ML: HCPCS | Performed by: PSYCHIATRY & NEUROLOGY

## 2023-01-26 PROCEDURE — 63600175 PHARM REV CODE 636 W HCPCS: Performed by: STUDENT IN AN ORGANIZED HEALTH CARE EDUCATION/TRAINING PROGRAM

## 2023-01-26 PROCEDURE — 25000003 PHARM REV CODE 250: Performed by: STUDENT IN AN ORGANIZED HEALTH CARE EDUCATION/TRAINING PROGRAM

## 2023-01-26 PROCEDURE — 63600175 PHARM REV CODE 636 W HCPCS

## 2023-01-26 PROCEDURE — 86900 BLOOD TYPING SEROLOGIC ABO: CPT | Performed by: PSYCHIATRY & NEUROLOGY

## 2023-01-26 PROCEDURE — 76937 US GUIDE VASCULAR ACCESS: CPT

## 2023-01-26 PROCEDURE — 36430 TRANSFUSION BLD/BLD COMPNT: CPT

## 2023-01-26 PROCEDURE — 36573 INSJ PICC RS&I 5 YR+: CPT

## 2023-01-26 PROCEDURE — 80053 COMPREHEN METABOLIC PANEL: CPT | Mod: 91 | Performed by: STUDENT IN AN ORGANIZED HEALTH CARE EDUCATION/TRAINING PROGRAM

## 2023-01-26 PROCEDURE — 25000003 PHARM REV CODE 250: Performed by: INTERNAL MEDICINE

## 2023-01-26 PROCEDURE — 99291 PR CRITICAL CARE, E/M 30-74 MINUTES: ICD-10-PCS | Mod: ,,, | Performed by: PSYCHIATRY & NEUROLOGY

## 2023-01-26 PROCEDURE — P9016 RBC LEUKOCYTES REDUCED: HCPCS | Performed by: STUDENT IN AN ORGANIZED HEALTH CARE EDUCATION/TRAINING PROGRAM

## 2023-01-26 PROCEDURE — 99291 CRITICAL CARE FIRST HOUR: CPT | Mod: ,,, | Performed by: PSYCHIATRY & NEUROLOGY

## 2023-01-26 PROCEDURE — 83735 ASSAY OF MAGNESIUM: CPT | Mod: 91 | Performed by: STUDENT IN AN ORGANIZED HEALTH CARE EDUCATION/TRAINING PROGRAM

## 2023-01-26 PROCEDURE — 85025 COMPLETE CBC W/AUTO DIFF WBC: CPT | Mod: 91

## 2023-01-26 PROCEDURE — 25000003 PHARM REV CODE 250: Performed by: PSYCHIATRY & NEUROLOGY

## 2023-01-26 PROCEDURE — 86920 COMPATIBILITY TEST SPIN: CPT

## 2023-01-26 PROCEDURE — 85025 COMPLETE CBC W/AUTO DIFF WBC: CPT | Mod: 91 | Performed by: PSYCHIATRY & NEUROLOGY

## 2023-01-26 RX ORDER — OXYCODONE HYDROCHLORIDE 5 MG/1
5 TABLET ORAL EVERY 4 HOURS PRN
Status: DISCONTINUED | OUTPATIENT
Start: 2023-01-26 | End: 2023-01-30

## 2023-01-26 RX ORDER — SODIUM CHLORIDE 0.9 % (FLUSH) 0.9 %
10 SYRINGE (ML) INJECTION
Status: DISCONTINUED | OUTPATIENT
Start: 2023-01-26 | End: 2023-02-28 | Stop reason: HOSPADM

## 2023-01-26 RX ORDER — SODIUM CHLORIDE 0.9 % (FLUSH) 0.9 %
10 SYRINGE (ML) INJECTION EVERY 6 HOURS
Status: DISCONTINUED | OUTPATIENT
Start: 2023-01-26 | End: 2023-02-28 | Stop reason: HOSPADM

## 2023-01-26 RX ORDER — HYDROCODONE BITARTRATE AND ACETAMINOPHEN 500; 5 MG/1; MG/1
TABLET ORAL
Status: DISCONTINUED | OUTPATIENT
Start: 2023-01-26 | End: 2023-01-29

## 2023-01-26 RX ADMIN — Medication: at 08:01

## 2023-01-26 RX ADMIN — GABAPENTIN 600 MG: 300 CAPSULE ORAL at 09:01

## 2023-01-26 RX ADMIN — MEROPENEM 2 G: 1 INJECTION INTRAVENOUS at 12:01

## 2023-01-26 RX ADMIN — DIAZEPAM 5 MG: 5 TABLET ORAL at 03:01

## 2023-01-26 RX ADMIN — ACETAMINOPHEN 1000 MG: 500 TABLET ORAL at 08:01

## 2023-01-26 RX ADMIN — Medication: at 01:01

## 2023-01-26 RX ADMIN — HEPARIN SODIUM 5000 UNITS: 5000 INJECTION INTRAVENOUS; SUBCUTANEOUS at 06:01

## 2023-01-26 RX ADMIN — Medication: at 09:01

## 2023-01-26 RX ADMIN — GABAPENTIN 600 MG: 300 CAPSULE ORAL at 08:01

## 2023-01-26 RX ADMIN — MEROPENEM 2 G: 1 INJECTION INTRAVENOUS at 07:01

## 2023-01-26 RX ADMIN — Medication: at 03:01

## 2023-01-26 RX ADMIN — ACETAMINOPHEN 1000 MG: 500 TABLET ORAL at 09:01

## 2023-01-26 RX ADMIN — BUPROPION HYDROCHLORIDE 300 MG: 300 TABLET, FILM COATED, EXTENDED RELEASE ORAL at 08:01

## 2023-01-26 RX ADMIN — PANTOPRAZOLE SODIUM 40 MG: 40 TABLET, DELAYED RELEASE ORAL at 08:01

## 2023-01-26 RX ADMIN — Medication 10 ML: at 01:01

## 2023-01-26 RX ADMIN — METHOCARBAMOL 1000 MG: 500 TABLET ORAL at 08:01

## 2023-01-26 RX ADMIN — VANCOMYCIN HYDROCHLORIDE 1750 MG: 500 INJECTION, POWDER, LYOPHILIZED, FOR SOLUTION INTRAVENOUS at 08:01

## 2023-01-26 RX ADMIN — MEROPENEM 2 G: 1 INJECTION INTRAVENOUS at 03:01

## 2023-01-26 RX ADMIN — GABAPENTIN 600 MG: 300 CAPSULE ORAL at 03:01

## 2023-01-26 RX ADMIN — METHOCARBAMOL 1000 MG: 500 TABLET ORAL at 05:01

## 2023-01-26 RX ADMIN — POTASSIUM CHLORIDE 20 MEQ: 1500 TABLET, EXTENDED RELEASE ORAL at 08:01

## 2023-01-26 RX ADMIN — METHOCARBAMOL 1000 MG: 500 TABLET ORAL at 09:01

## 2023-01-26 RX ADMIN — OXYCODONE HYDROCHLORIDE 5 MG: 5 TABLET ORAL at 07:01

## 2023-01-26 RX ADMIN — Medication: at 04:01

## 2023-01-26 RX ADMIN — METHOCARBAMOL 1000 MG: 500 TABLET ORAL at 12:01

## 2023-01-26 RX ADMIN — Medication 10 ML: at 05:01

## 2023-01-26 RX ADMIN — ACETAMINOPHEN 1000 MG: 500 TABLET ORAL at 03:01

## 2023-01-26 RX ADMIN — OXYCODONE HYDROCHLORIDE 5 MG: 5 TABLET ORAL at 09:01

## 2023-01-26 NOTE — CONSULTS
Double lumen PICC to L basilic vein.  39 cm in length, 35 cm arm circumference and 0 cm exposed.   Lot # [id number].

## 2023-01-26 NOTE — PT/OT/SLP PROGRESS
Occupational Therapy      Patient Name:  Rachel Zazueta   MRN:  [id number]    Patient not seen today secondary to HOB flat restrictions and low hgb this AM. Will follow-up as appropriate.     1/26/2023

## 2023-01-26 NOTE — ASSESSMENT & PLAN NOTE
Thoracic discitis s/p laminectomy on 1/24. Operative cultures with GNR x 2 pending identification. Pending further surgical intervention on 2/1.   · Continue meropenem.   · Will follow up operative cultures.  · Can likely discontinue vancomycin if no gram positive isolated on cultures by 1/26.  · Patient is NOT a candidate for home IV antibiotics due to lack of transportation, inability to follow up in clinic, and recent relapse with illicit drugs. I recommend patient be placed in SNF or LTAC once ready for discharge in order to complete her antibiotic therapy. I have discussed this with the patient at bedside today.

## 2023-01-26 NOTE — PT/OT/SLP PROGRESS
Physical Therapy      Patient Name:  Rachel Zazueta   MRN:  [id number]    Patient not seen today secondary to HOB flat restrictions and low hgb this AM. Will follow-up as appropriate.

## 2023-01-26 NOTE — PROGRESS NOTES
[facility]  Neurocritical Care  Progress Note    Admit Date: 1/19/2023  Service Date: 01/26/2023  Length of Stay: 7    Subjective:     Chief Complaint: Weakness of both lower extremities    History of Present Illness: 42-year-old female with a history of IV drug use, chronic hep C with cirrhosis, spinal fusion in April 2021, complicated by epidural abscess with removal of hardware February 2022 and against spinal fusion in March 2022 of T10 through the pelvis as well as neurogenic bladder who initially presented to [facility] in late December for generalized weakness, fatigue and bilateral flank pain.  Her course at [facility] was complicated by a E coli bacteremia thought to be due to a urinary source, she is completed a 7 day course of meropenem on 1/3, and had bcx from 1/2 that did not grow any bacteria.       She also had an ICU admission for ongoing back spasms and agitation requiring Precedex.  In early January patient started developing progressive lower extremity weakness greater in the left compared to the right.  She also had an MRI at the outside hospital that showed some white matter changes concerning for possible demyelinating disease.  However her thoracic spine radiographs also showed worsening of her kyphosis at T9-T10.  Due to the concern for either a demyelinating polyneuropathy, MS, or spinal cord compression the patient was transferred to [facility] for neurosurgical evaluation.       On admission the patient had a CT lumbar and thoracic spine and an MRI CTL spine. The MRI showed  focal kyphosis T8 through T10, with possible associated cord edema signal.  As well as extensive edema throughout the posterior paraspinal musculature concerning for possible infectious or inflammatory process.  There was concern that the findings at T8 through T10 could correspond with compressive myelopathy.  The CT lumbar spine and thoracic spine showed spondylodiscitis at T8  through T10, as well as pathologic compression fractures at T9 and T10.  There were also erosive changes involving the sacroiliac joints bilaterally which was concerning for sacroiliitis and could be infectious.     Patient underwent laminectomy T8-T10; posterior fusion T8-T12; and washout by NSGY today. No complications noted during surgery. Patient is HDS and on 3L NC. Patient is currently on a PCA dilaudid pump.            Hospital Course: 1/25/2023 NAEO, pain well controlled on current regimen. Continue ABX. Maintain logroll precautions and HOB <30 until second stage of surgery.  01/26/2023 Hemoglobin 6.6, repeat 6.2. Increased drainage noted from bilateral hemovac. S/p 1u pRBC. PICC team consulted for long term access for antibiotics, remove IJ central line when PICC placed.       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9    CHOLECYSTECTOMY      KIDNEY SURGERY      LUMBAR FUSION Bilateral 3/2/2022    Procedure: FUSION, SPINE, LUMBAR;  Surgeon: Guille Tepmleton MD;  Location: [address];  Service: Neurosurgery;  Laterality: Bilateral;  AIRO  T10--Pelvis    LUMBAR FUSION N/A 1/24/2023    Procedure: **AIRO** T8-T12 POSTERIOR FUSION, T8-T10 LAMINECTOMY, HARDWARE REMOVAL AND WASHOUT;  Surgeon: Guille Templeton MD;  Location: [address];  Service: Neurosurgery;  Laterality: N/A;    REMOVAL OF HARDWARE FROM SPINE N/A 2/21/2022    Procedure: REMOVAL, HARDWARE, SPINE;  Surgeon: Guille Templeton MD;  Location: [address];  Service: Neurosurgery;   Laterality: N/A;  Washout    SPINE SURGERY        No current facility-administered medications on file prior to encounter.     Current Outpatient Medications on File Prior to Encounter   Medication Sig Dispense Refill    albuterol (ACCUNEB) 1.25 mg/3 mL Nebu Take 3 mLs (1.25 mg total) by nebulization every 6 (six) hours as needed (shortness of breath). Rescue 75 mL 0    buPROPion (WELLBUTRIN) 100 MG tablet Take 1 tablet (100 mg total) by mouth 2 (two) times daily. 60 tablet 2    folic acid (FOLVITE) 1 MG tablet Take 1 tablet (1 mg total) by mouth once daily. (Patient not taking: Reported on 11/25/2022) 42 tablet 0    gabapentin (NEURONTIN) 300 MG capsule Take 1 capsule (300 mg total) by mouth 3 (three) times daily. 90 capsule 11    lactulose (CHRONULAC) 20 gram/30 mL Soln Take 30 mLs (20 g total) by mouth 3 (three) times daily. 2700 mL 2    pantoprazole (PROTONIX) 40 MG tablet Take 1 tablet (40 mg total) by mouth once daily. 30 tablet 1    [DISCONTINUED] diclofenac sodium (VOLTAREN) 1 % Gel Apply 2 g topically 4 (four) times daily. 50 g 0      Allergies: Bee venom protein (honey bee), Naproxen, Wasp sting [allergen ext-venom-honey bee], Adhesive, Iodine and iodide containing products, Shellfish containing products, and Nuts [tree nut]    Family History   Problem Relation Age of Onset    Hepatitis Mother     Drug abuse Mother     Liver cancer Mother     Drug abuse Father     Cirrhosis Father     Hepatitis Father        Subjective:     Interval History: As above.     Review of Systems   Constitutional:  Negative for fever.   HENT:  Negative for congestion and rhinorrhea.    Eyes:  Negative for visual disturbance.   Respiratory:  Negative for shortness of breath.    Cardiovascular:  Negative for chest pain.   Gastrointestinal:  Negative for abdominal distention, abdominal pain, nausea and vomiting.   Genitourinary:  Negative for dysuria and hematuria.   Musculoskeletal:  Positive for back pain. Negative  for neck pain.   Neurological:  Positive for weakness. Negative for dizziness, seizures, light-headedness, numbness and headaches.     Objective:     Vitals:    Temp: 98.7 °F (37.1 °C)  Pulse: (!) 118  Rhythm: sinus tachycardia  BP: (!) 122/58  MAP (mmHg): 83  Resp: (!) 21  ETCO2 (mmHg): 35 mmHg  SpO2: 99 %    Temp  Min: 97.9 °F (36.6 °C)  Max: 98.7 °F (37.1 °C)  Pulse  Min: 113  Max: 133  BP  Min: 103/57  Max: 144/59  MAP (mmHg)  Min: 75  Max: 95  Resp  Min: 9  Max: 35  ETCO2 (mmHg)  Min: 31 mmHg  Max: 41 mmHg  SpO2  Min: 91 %  Max: 100 %    01/25 0701 - 01/26 0700  In: 5757.4 [I.V.:3712.1]  Out: 1970 [Urine:1410; Drains:560]   Unmeasured Output  Urine Occurrence: 1  Stool Occurrence: 0       Physical Exam  Vitals and nursing note reviewed.   Constitutional:       General: She is not in acute distress.     Appearance: She is obese. She is not ill-appearing, toxic-appearing or diaphoretic.   HENT:      Head: Normocephalic.      Mouth/Throat:      Mouth: Mucous membranes are moist.   Eyes:      General: No scleral icterus.        Right eye: No discharge.         Left eye: No discharge.   Cardiovascular:      Rate and Rhythm: Normal rate and regular rhythm.   Pulmonary:      Effort: Pulmonary effort is normal. No respiratory distress.      Comments: NC in place  Abdominal:      General: Abdomen is flat.      Palpations: Abdomen is soft.      Tenderness: There is no abdominal tenderness.   Musculoskeletal:         General: No deformity.      Cervical back: No rigidity.   Skin:     General: Skin is warm and dry.      Coloration: Skin is not jaundiced.   Neurological:      Mental Status: She is alert and oriented to person, place, and time. Mental status is at baseline.      Motor: Weakness (Bilateral LE) and tremor (Bilateral UE) present.      Comments:   E4 V5 M6  Awake, alert, and oriented to self, time, place, and situation.   Answering all questions appropriately and following all commands briskly.   No facial  droop. EOMI. PERRL. Peripheral vision intact bilaterally.   FOWLER spontaneously and follows commands  RUE: 3/5  LUE: 3/5  RLE: 2/5  LLE: 2/5  Sensation intact     Psychiatric:         Mood and Affect: Mood normal.         Behavior: Behavior normal.       Today I personally reviewed pertinent medications, imaging, laboratory results, microbiology results,     Hg 6.6 --> 6.2        Assessment/Plan:     Psychiatric  Anxiety and depression  Continue Buproprion for depression  Continue Lorazepam for anxiety  Continue Gabapentin for neuropathy and anxiety    Substance use disorder  Denies IV drug use (meth) for past 3 years.  Denies alcohol and tobacco abuse.       ID  Thoracic discitis  CT T/L spine thin cut 1/20 with haloing of pelvic and T10 screws, spondylodiscitis T8-9, T9-10  CT C spine with concern for discitis at C6-7  Patient is currently afebrile with chronic pancytopenia  Previously on Amoxicillin up until surgery for chronic suppressive therapy.  ID following  Blood cx (1/20) NGTD  Pending intra-operative cultures   Continue Vancomycin and meropenem  Daily CBC    GI  Chronic hepatitis C with cirrhosis  Hx of Hep C. See Cirrhosis of Liver    Cirrhosis of liver with ascites  Patient has reportedly refused transplant evaluation in the past.   -Daily CMP and trend LFTs  -Continue Lactulose 20g TID   -Will need GI/hepatology follow up outpatient    MELD-Na score: 14 at 1/26/2023  2:08 AM  MELD score: 12 at 1/26/2023  2:08 AM  Calculated from:  Serum Creatinine: 0.6 mg/dL (Using min of 1 mg/dL) at 1/26/2023  2:08 AM  Serum Sodium: 135 mmol/L at 1/26/2023  2:08 AM  Total Bilirubin: 1.7 mg/dL at 1/26/2023  2:08 AM  INR(ratio): 1.4 at 1/25/2023  1:08 AM  Age: 42 years    Orthopedic  * Weakness of both lower extremities  41 yo F w/ history of multiple spinal surgeries presented to OSH with possible infection of patient's shoulder and found to have UTI and E. Coli bacteremia and progressively worse B/L LE weakness.  Transferred [place] [facility] for [place]surgical evaluation. Underwent Laminectomy T8-T10; Posterior spinal fusion T8-T12; hardware removal and washout. Admitted to [facility] for HLOC. Arrived to unit on PCA dilaudid pump. Patient is HDS on 3L NC.     -Neuro checks q2hr  -Vital signs q2hr  -NSGY following  -MAP goals >65  -SBP goals <180  -PRN labetalol, hydralazine  -Patient will need to lie flat with HOB 15-20  -Started on Vancomycin and meropenem, PICC consulted  -MM pain regimen; PCA Dilaudid pump for pain management  -TSH, Lipid panel, and A1C pending  -PT/INR, aPTT   -CBC, CMP, Mag, Phos daily   -Transfuse for Hg<7  -PT/OT                The patient is being Prophylaxed for:  Venous Thromboembolism with: Chemical  Stress Ulcer with: Not Applicable   Ventilator Pneumonia with: not applicable    Activity Orders          Turn patient starting at 01/24 1800    Up in chair With meals starting at 01/24 1700    Diet Adult Regular (IDDSI Level 7): Regular starting at 01/24 1106    Progressive Mobility Protocol (mobilize patient to their highest level of functioning at least twice daily) starting at 01/24 1105    Elevate HOB 30 starting at 01/24 1105    Ambulate With Assistance, Post Op Day 0 If the patient arrives from PACU by 4PM, walk at least once on day of operation if alert and safe to do so. starting at 01/24 1104        Full Code    Himanshu Dior MD  Neurocritical Care  [facility]

## 2023-01-26 NOTE — ASSESSMENT & PLAN NOTE
Patient has reportedly refused transplant evaluation in the past.   -Daily CMP and trend LFTs  -Continue Lactulose 20g TID   -Will need GI/hepatology follow up outpatient    MELD-Na score: 14 at 1/26/2023  2:08 AM  MELD score: 12 at 1/26/2023  2:08 AM  Calculated from:  Serum Creatinine: 0.6 mg/dL (Using min of 1 mg/dL) at 1/26/2023  2:08 AM  Serum Sodium: 135 mmol/L at 1/26/2023  2:08 AM  Total Bilirubin: 1.7 mg/dL at 1/26/2023  2:08 AM  INR(ratio): 1.4 at 1/25/2023  1:08 AM  Age: 42 years

## 2023-01-26 NOTE — PROGRESS NOTES
[facility]  Infectious Disease  Progress Note    Patient Name: Rachel Zazueta  MRN: [id number]  Admission Date: 1/19/2023  Length of Stay: 7 days  Attending Physician: Eber Montoya DO  Primary Care Provider: Dannielle Merino DO    Isolation Status: Contact  Assessment/Plan:      Thoracic discitis  Thoracic discitis s/p laminectomy on 1/24. Operative cultures with E coli and ESBL E coli. Pending further surgical intervention on 2/1.   · Continue meropenem.   · Will follow up operative cultures.  · Will likely transition to ertapenem after her stage 2 intervention.   · Patient is NOT a candidate for home IV antibiotics due to lack of transportation, inability to follow up in clinic, and recent relapse with illicit drugs. I recommend patient be placed in SNF or LTAC once ready for discharge in order to complete her antibiotic therapy. I have discussed this with the patient at bedside today.       Anticipated Disposition: per primary    Thank you for your consult. I will follow-up with patient. Please contact us if you have any additional questions.    So Avitia MD  Infectious Disease  [facility]    Subjective:     Principal Problem:Weakness of both lower extremities    HPI: Ms. Zazueta is a 42-year-old woman known to ID with hx of IVDU, IV drug use (methamphetamine), chronic HCV with cirrhosis, chronic pancytopenia, spinal fusion (04/2021), epidural abscess (GBS) with removal of hardware (02/2022), T10-pelvis spinal fusion with hardware (03/2022), obesity,  and neurogenic bladder who was admitted to [facility] on 12/23/22 with back pain and increasing weakness in the lower extremities.       Urine and blood cx from that admission + for ESBL E coli which was treated with ertapenem.  Repeat blood cx on 12/28 and 1/10 negative.   Patient started on physical therapy but weakness in the lower extremities increased, and the patient is no longer able to walk.  "She is able to urinate intermittently and defecate.      An MRI cervical spine 1/10 was significant  for multilevel spondylosis.  MRI brain with nonspecific findings.  Xray of spine notable for focal kyphosis at T9-10 increased when compared to prior scoliosis radiographs.  An MRI of the spine was not available due to the patient's size. and she was transferred to [facility] for advanced imaging and Neurosurgery evaluation.      MRI  imaging here  on 1/20 concerning for worsening kyphosis, extensive edema in the paraspinal muscle. CT with spondylodiscitis T8-9, T9-10    Neurosurgery planning hardware removal and washout on 1/24    Of note, review of records show admission to [facility] 11/14/2022 to 11/22/2022 with pyogenic arthritis of the left shoulder, s/p I&D on 11/15 with cx positive for E Coli.  Utox was positive for methadone, opiates, amphetamines, and THC.  Seen by ID at [facility] who recommend IV ceftriaxone X 6 weeks at LTAC, but patient was not agreeable to this.  Offered  daily ceftriaxone infusions at infusion center.  At discharge from there she had received 8 days of ceftriaxone. Review of notes show she was subsequently prescribed 4 weeks of Bactrim 2 DS tabs twice daily.       Interval History: "Still inn pain". S/P thoracic laminectomy with fusion and washout on 1/24. Operative cultures with E coli and ESBL E coli. Pending stage 2 of her operative management.     Review of Systems   Constitutional:  Negative for chills, fatigue, fever and unexpected weight change.   HENT:  Negative for ear pain, facial swelling, hearing loss, mouth sores, nosebleeds, rhinorrhea, sinus pressure, sore throat, tinnitus, trouble swallowing and voice change.    Eyes:  Negative for photophobia, pain, redness and visual disturbance.   Respiratory:  Negative for cough, chest tightness, shortness of breath and wheezing.    Cardiovascular:  Negative for chest pain, palpitations and leg swelling. "   Gastrointestinal:  Negative for abdominal pain, blood in stool, constipation, diarrhea, nausea and vomiting.   Endocrine: Negative for cold intolerance, heat intolerance, polydipsia, polyphagia and polyuria.   Genitourinary:  Negative for decreased urine volume, dysuria, flank pain, frequency, hematuria, menstrual problem, urgency, vaginal bleeding, vaginal discharge and vaginal pain.   Musculoskeletal:  Positive for back pain and myalgias. Negative for arthralgias, joint swelling and neck pain.   Skin:  Negative for rash.   Allergic/Immunologic: Negative for environmental allergies, food allergies and immunocompromised state.   Neurological:  Negative for dizziness, seizures, syncope, weakness, light-headedness, numbness and headaches.   Hematological:  Negative for adenopathy. Does not bruise/bleed easily.   Psychiatric/Behavioral:  Negative for confusion, hallucinations, self-injury, sleep disturbance and suicidal ideas. The patient is not nervous/anxious.    Objective:     Vital Signs (Most Recent):  Temp: 97.6 °F (36.4 °C) (01/26/23 1502)  Pulse: (!) 115 (01/26/23 1702)  Resp: 11 (01/26/23 1702)  BP: 112/61 (01/26/23 1702)  SpO2: 99 % (01/26/23 1702)   Vital Signs (24h Range):  Temp:  [97.6 °F (36.4 °C)-98.7 °F (37.1 °C)] 97.6 °F (36.4 °C)  Pulse:  [113-133] 115  Resp:  [9-35] 11  SpO2:  [91 %-100 %] 99 %  BP: (103-144)/(54-83) 112/61     Weight: 104 kg (229 lb 4.5 oz)  Body mass index is 35.91 kg/m².    Estimated Creatinine Clearance: 151.6 mL/min (based on SCr of 0.6 mg/dL).    Physical Exam  Vitals and nursing note reviewed.   Constitutional:       Appearance: She is well-developed.   HENT:      Head: Normocephalic and atraumatic.      Right Ear: External ear normal.      Left Ear: External ear normal.   Eyes:      General: No scleral icterus.        Right eye: No discharge.         Left eye: No discharge.      Conjunctiva/sclera: Conjunctivae normal.   Cardiovascular:      Rate and Rhythm: Normal rate and  regular rhythm.      Heart sounds: Normal heart sounds. No murmur heard.    No friction rub. No gallop.   Pulmonary:      Effort: Pulmonary effort is normal. No respiratory distress.      Breath sounds: Normal breath sounds. No stridor. No wheezing or rales.   Abdominal:      General: Bowel sounds are normal.   Musculoskeletal:         General: No tenderness. Normal range of motion.   Skin:     General: Skin is warm and dry.   Neurological:      Mental Status: She is alert and oriented to person, place, and time.       Significant Labs: Blood Culture:   Recent Labs   Lab 01/10/23  0616 01/10/23  0621 01/10/23  2344 01/10/23  2348 01/20/23  1131   LABBLOO No growth after 5 days. No growth after 5 days. No growth after 5 days. No growth after 5 days. No growth after 5 days.  No growth after 5 days.       BMP:   Recent Labs   Lab 01/26/23  0208   *   *   K 4.6      CO2 22*   BUN 25*   CREATININE 0.6   CALCIUM 7.7*   MG 1.6       CBC:   Recent Labs   Lab 01/26/23  0208 01/26/23  0721 01/26/23  1312   WBC 8.82 7.16 7.73   HGB 6.6* 6.2* 7.5*   HCT 21.0* 20.6* 23.7*   * 167 138*       Wound Culture:   Recent Labs   Lab 11/15/22  1301 01/24/23  0914 01/24/23  0943   LABAERO ESCHERICHIA COLI  Few  * ESCHERICHIA COLI ESBL  Few  *  ESCHERICHIA COLI  Rare  * ESCHERICHIA COLI ESBL  Few  *  Further report to follow         Significant Imaging: I have reviewed all pertinent imaging results/findings within the past 24 hours.

## 2023-01-26 NOTE — PROGRESS NOTES
[facility]  Neurosurgery  Progress Note    Subjective:     History of Present Illness: Ms. Zazueta is a 42 y.o F w/ hx ofIV drug use (methamphetamine), chronic HCV with cirrhosis, spinal fusion (04/2021), epidural abscess with removal of hardware (02/2022), spinal fusion with hardware (T10 - Pelvis / 03/2022), obesity (BMI 39.3) and neurogenic bladder and recent shoulder surgery who initially presented to [facility] for evaluation of back pain and left leg weakness.  She reports initially noting the left leg weakness when she was about to go to the bathroom and was about to fall but was assisted by her boyfriend.  She was brought to the ED via EMS.  Urinalysis with UTI concerns and blood cultures at OSH grew ESBL E coli, and shoulder effusion concern for infection so she was treated with meropenem.  During hospitalization, she reports the weakness that started out in her left leg initially then spread upwards to her left thigh, left hip, then crossed over to the right hip and spread to her right leg.  Denies recent IV drug use. She reports her last incidence of drug use was more than 3 years ago and also denies tobacco, and alcohol abuse.  Reports cannabis use.     OSH course notable for concerning urinalysis and blood cultures positive for ESBL E coli treated with meropenem.  She was also admit to the ICU where she was treated with Precedex for significant body aches, irritability, and muscle spasms.  Concerns of a murmur on physical exam so she underwent TTE which did not show any vegetations.  Worsening lower extremity weakness workup with MRI head nonspecific, MRI cervical spine with multilevel spondylosis, X-ray spine with multilevel thoracolumbar fusion and diskectomy, focal kyphosis at T9-10 increased compared to prior.  She was started on prophylaxis amoxicillin due to her history of infected hardware.  MRI spine unable to be completed due to patient's body habitus so she was  transferred to [facility] [place] for further imaging and Neurosurgery, Neurology evaluation.      Post-Op Info:  Procedure(s) (LRB):  **AIRO** T8-T12 POSTERIOR FUSION, T8-T10 LAMINECTOMY, HARDWARE REMOVAL AND WASHOUT (N/A)   2 Days Post-Op     Interval History: 1/26 :  doing well post op.  Transitioning to oral pain meds today. OR Wednesday.     Medications:  Continuous Infusions:   sodium chloride 0.9% 100 mL/hr at 01/26/23 0601    hydromorphone in 0.9 % NaCl 6 mg/30 ml       Scheduled Meds:   acetaminophen  1,000 mg Oral TID    buPROPion  300 mg Oral Daily    gabapentin  600 mg Oral TID    lactulose  20 g Oral TID    meropenem (MERREM) IVPB  2 g Intravenous Q8H    methocarbamoL  1,000 mg Oral QID    pantoprazole  40 mg Oral Daily    potassium chloride  20 mEq Oral Daily    vancomycin (VANCOCIN) IVPB  1,750 mg Intravenous Q12H     PRN Meds:sodium chloride, dextrose 10%, dextrose 10%, diazePAM, glucagon (human recombinant), glucose, glucose, hydrALAZINE, labetalol, magnesium hydroxide 400 mg/5 ml, melatonin, naloxone, oxyCODONE, polyethylene glycol, promethazine, sodium chloride 0.9%, Pharmacy to dose Vancomycin consult **AND** vancomycin - pharmacy to dose     Review of Systems  Objective:     Weight: 104 kg (229 lb 4.5 oz)  Body mass index is 35.91 kg/m².  Vital Signs (Most Recent):  Temp: 97.9 °F (36.6 °C) (01/26/23 0702)  Pulse: (!) 125 (01/26/23 0702)  Resp: 16 (01/26/23 0850)  BP: (!) 103/57 (01/26/23 0702)  SpO2: 100 % (01/26/23 0702) Vital Signs (24h Range):  Temp:  [97.9 °F (36.6 °C)-98.7 °F (37.1 °C)] 97.9 °F (36.6 °C)  Pulse:  [116-131] 125  Resp:  [9-30] 16  SpO2:  [93 %-100 %] 100 %  BP: (103-128)/(54-83) 103/57     Date 01/26/23 0700 - 01/27/23 0659   Shift 7941-9178 4260-6969 4865-0169 24 Hour Total   INTAKE   Shift Total(mL/kg)       OUTPUT   Urine(mL/kg/hr) 75   75   Shift Total(mL/kg) 75(0.7)   75(0.7)   Weight (kg) 104 104 104 104                            Neurosurgery Physical Exam  General: well  developed, well nourished, no distress.   Head: normocephalic, atraumatic  Neurologic: Alert and oriented. Thought content appropriate.  GCS: Motor: 6/Verbal: 5/Eyes: 4 GCS Total: 15  Mental Status: Awake, Alert, Oriented x 4  Language: No aphasia  Speech: No dysarthria  Cranial nerves: face symmetric, tongue midline, CN II-XII grossly intact.   Eyes: pupils equal, round, reactive to light with accommodation, EOMI, nystagmus.   Pulmonary: normal respirations, no signs of respiratory distress  Abdomen: soft, non-distended, not tender to palpation  Skin: Skin is warm, dry and intact.  Sensory: intact to light touch throughout     Motor Strength:Moves all extremities spontaneously with good tone.  Full strength upper and extremities. No abnormal movements seen.      Strength   Deltoids Triceps Biceps Wrist Extension Wrist Flexion Hand    Upper: R 5/5 5/5 5/5 5/5 5/5 5/5     L 5/5 5/5 5/5 5/5 5/5 5/5       Iliopsoas Quadriceps Knee  Flexion Tibialis  anterior Gastro- cnemius EHL   Lower: R 3/5 3/5 3/5 4-/5 4-/5 4-/5     L 3/5 3/5 3/5 4-/5 4-/5 4-/5      Reflexes:   DTR: 2+ symmetrically throughout.  Parker's: Negative.  Babinski's: Present bilaterally  Clonus: 2 beats bilateral lower extremity     Incision well healed       Significant Labs:  Recent Labs   Lab 01/24/23  1304 01/25/23  0108 01/26/23  0208   * 132* 111*   * 134* 135*   K 4.7 4.8 4.6    108 109   CO2 24 22* 22*   BUN 14 20 25*   CREATININE 0.6 0.5 0.6   CALCIUM 7.5* 8.2* 7.7*   MG  --  1.7 1.6       Recent Labs   Lab 01/25/23  0240 01/26/23  0208 01/26/23  0721   WBC 6.57 8.82 7.16   HGB 7.6* 6.6* 6.2*   HCT 23.7* 21.0* 20.6*    146* 167       Recent Labs   Lab 01/25/23  0108   INR 1.4*   APTT 25.0       Microbiology Results (last 7 days)       Procedure Component Value Units Date/Time    AFB Culture & Smear [[id number]] Collected: 01/24/23 0955    Order Status: Completed Specimen: Bone from Back Updated: 01/25/23 2127     AFB  Culture & Smear Culture in progress     AFB CULTURE STAIN No acid fast bacilli seen.    Narrative:      4) Perispinal    AFB Culture & Smear [[id number]] Collected: 01/24/23 0943    Order Status: Completed Specimen: Bone from Back Updated: 01/25/23 2127     AFB Culture & Smear Culture in progress     AFB CULTURE STAIN No acid fast bacilli seen.    Narrative:      3) Perispinal    AFB Culture & Smear [[id number]] Collected: 01/24/23 0913    Order Status: Completed Specimen: Body Fluid from Back Updated: 01/25/23 2127     AFB Culture & Smear Culture in progress     AFB CULTURE STAIN No acid fast bacilli seen.    Narrative:      1) Perispinal    AFB Culture & Smear [[id number]] Collected: 01/24/23 0913    Order Status: Completed Specimen: Body Fluid from Back Updated: 01/25/23 2127     AFB Culture & Smear Culture in progress     AFB CULTURE STAIN No acid fast bacilli seen.    Narrative:      2) Perispinal    Blood culture [[id number]] Collected: 01/20/23 1131    Order Status: Completed Specimen: Blood Updated: 01/25/23 1412     Blood Culture, Routine No growth after 5 days.    Narrative:      Collection has been rescheduled by [facility] at 01/20/2023 11:03 Reason:   Patient unavailable in room with doctors   Collection has been rescheduled by [facility] at 01/20/2023 11:03 Reason:   Patient unavailable in room with doctors     Blood culture [[id number]] Collected: 01/20/23 1131    Order Status: Completed Specimen: Blood Updated: 01/25/23 1412     Blood Culture, Routine No growth after 5 days.    Narrative:      Collection has been rescheduled by [facility] at 01/20/2023 11:03 Reason:   Patient unavailable in room with doctors   Collection has been rescheduled by [facility] at 01/20/2023 11:03 Reason:   Patient unavailable in room with doctors     Aerobic culture [[id number]]  (Abnormal) Collected: 01/24/23 0914    Order Status: Completed Specimen: Wound from Back Updated: 01/25/23 1123     Aerobic Bacterial Culture GRAM NEGATIVE  VINCENT  Rare  Identification and susceptibility pending      Narrative:      1) Perispinal    Aerobic culture [[id number]]  (Abnormal) Collected: 01/24/23 0914    Order Status: Completed Specimen: Wound from Back Updated: 01/25/23 1120     Aerobic Bacterial Culture GRAM NEGATIVE VINCENT  Few  Identification and susceptibility pending      Narrative:      2) Perispinal    Aerobic culture [[id number]]  (Abnormal) Collected: 01/24/23 0943    Order Status: Completed Specimen: Bone from Back Updated: 01/25/23 1033     Aerobic Bacterial Culture GRAM NEGATIVE VINCENT  Few  Identification and susceptibility pending        GRAM NEGATIVE VINCENT  Rare  Identification and susceptibility pending      Narrative:      3) Perispinal    Aerobic culture [[id number]] Collected: 01/24/23 0943    Order Status: Completed Specimen: Bone from Back Updated: 01/25/23 0839     Aerobic Bacterial Culture No growth    Narrative:      4) Perispinal    Culture, Anaerobe [[id number]] Collected: 01/24/23 0913    Order Status: Completed Specimen: Body Fluid from Back Updated: 01/25/23 0658     Anaerobic Culture Culture in progress    Narrative:      1) Perispinal    Culture, Anaerobe [[id number]] Collected: 01/24/23 0943    Order Status: Completed Specimen: Bone from Back Updated: 01/25/23 0658     Anaerobic Culture Culture in progress    Narrative:      3) Perispinal    Culture, Anaerobe [[id number]] Collected: 01/24/23 0913    Order Status: Completed Specimen: Body Fluid from Back Updated: 01/25/23 0658     Anaerobic Culture Culture in progress    Narrative:      2) Perispinal    Culture, Anaerobe [[id number]] Collected: 01/24/23 0943    Order Status: Completed Specimen: Bone from Back Updated: 01/25/23 0658     Anaerobic Culture Culture in progress    Narrative:      4) Perispinal    Gram stain [[id number]] Collected: 01/24/23 0913    Order Status: Completed Specimen: Body Fluid from Back Updated: 01/24/23 1239     Gram Stain Result No WBC's      No organisms seen     Narrative:      1) Perispinal    Gram stain [[id number]] Collected: 01/24/23 0944    Order Status: Completed Specimen: Bone from Back Updated: 01/24/23 1238     Gram Stain Result Rare WBC's      No organisms seen    Narrative:      3) Perispinal    Gram stain [[id number]] Collected: 01/24/23 0913    Order Status: Completed Specimen: Body Fluid from Back Updated: 01/24/23 1237     Gram Stain Result Rare WBC's      No organisms seen    Narrative:      2) Perispinal    Gram stain [[id number]] Collected: 01/24/23 0943    Order Status: Completed Specimen: Bone from Back Updated: 01/24/23 1236     Gram Stain Result No WBC's      No organisms seen    Narrative:      4) Perispinal    Fungus culture [[id number]] Collected: 01/24/23 0943    Order Status: Sent Specimen: Bone from Back Updated: 01/24/23 1009    Fungus culture [[id number]] Collected: 01/24/23 0943    Order Status: Sent Specimen: Bone from Back Updated: 01/24/23 1007    Fungus culture [[id number]] Collected: 01/24/23 0913    Order Status: Sent Specimen: Body Fluid from Back Updated: 01/24/23 0929    Fungus culture [[id number]] Collected: 01/24/23 0913    Order Status: Sent Specimen: Body Fluid from Back Updated: 01/24/23 0927    Urine culture [[id number]] Collected: 01/20/23 0130    Order Status: Completed Specimen: Urine Updated: 01/21/23 1144     Urine Culture, Routine Multiple organisms isolated. None in predominance.  Repeat if      clinically necessary.    Narrative:      Specimen Source->Urine    Blood culture [[id number]]     Order Status: Canceled Specimen: Blood     Culture, Respiratory with Gram Stain [[id number]]     Order Status: Canceled Specimen: Respiratory           All pertinent labs from the last 24 hours have been reviewed.    Significant Diagnostics:  CT C-spine:  Vertebrae: The dens, lateral masses, and occipital condyles are intact.  There is no evidence of fracture or dislocation.     Discs: Multilevel disc height loss and degenerative endplate  change.  Prominent C6 inferior endplate Schmorl's node, similar in appearance dating back to MRI from 02/14/2022.     Degenerative changes: Sent spinal canal dimensions at C3-C4 are 10.5 mm, 8.5 mm at C4-C5, 10.0 mm at C5-C6, and 10.8 mm at C6-C7 .  Multilevel uncovertebral spurring with multilevel neural foraminal narrowing most pronounced at C5-C6 with moderate right neural foraminal narrowing and C6-C7 with moderate left neural foraminal narrowing.    Physical Exam  Neurosurgery Physical Exam    Assessment/Plan:     * Weakness of both lower extremities  Pt is 42F multiple spinal surgeries and revisions last T10- Pelvis in March 2022 who presented to OSH with concern for shoulder infection and UTI found to have E coli sepsis who has had progressive worsening BLE weakness, XR showed possible worsening proximal junctional kyphotic deformity. Transferred to [facility] [place] to  and neurosurgery was consulted    OR yesterday for temporary T6-12 PSIF. Taz pus noted intraoperatively.   Given intraoperative findings, new operative plan for T4-pelvis revision and extension of fusion with T9-10 corpectomy with plastic surgery assistance planned for 2/1/23    Plan:  Admitted to ICU, Keep in ICU on logroll precautions until stage 2 Wednesday.    -- MRI C/T/L spine 1/20 with C7 SP enhancement, focal kyphosis at T8-10 with severe anterior height loss at T9, enhancing C6 inferior endplate Schmorl's node  -- CT T/L spine thin cut 1/20 with haloing of pelvic and T10 screws, spondylodiscitis T8-9, T9-10  -- CT C spine with concern for discitis at C6-7  -- flex ext XR done, limited view of C6-7 in swimmers view 2/2 shoulders  -- 1/20 ESR 70, CRP 9.4. elevated from prior   -- TLSO at bedside  --HV x2 to gravity, WV in place, monitor ouput  --ID: Vanc/merropenam   -- BCx 1/20: NGTD   -- OR cultures 1/24 gram stain negative, cultures pending  --OR 2/1/23 as above   -- will obtain informed consent   -- NPO 1/31/23 MN  -- multimodal pain control  per primary medicine team  NSGY will continue to follow. Please contact team with any further questions.        Jaylon Gomes MD  Neurosurgery  [facility]

## 2023-01-26 NOTE — SUBJECTIVE & OBJECTIVE
"Interval History: "Still inn pain". S/P thoracic laminectomy with fusion and washout on 1/24. Operative cultures with E coli and ESBL E coli. Pending stage 2 of her operative management.     Review of Systems   Constitutional:  Negative for chills, fatigue, fever and unexpected weight change.   HENT:  Negative for ear pain, facial swelling, hearing loss, mouth sores, nosebleeds, rhinorrhea, sinus pressure, sore throat, tinnitus, trouble swallowing and voice change.    Eyes:  Negative for photophobia, pain, redness and visual disturbance.   Respiratory:  Negative for cough, chest tightness, shortness of breath and wheezing.    Cardiovascular:  Negative for chest pain, palpitations and leg swelling.   Gastrointestinal:  Negative for abdominal pain, blood in stool, constipation, diarrhea, nausea and vomiting.   Endocrine: Negative for cold intolerance, heat intolerance, polydipsia, polyphagia and polyuria.   Genitourinary:  Negative for decreased urine volume, dysuria, flank pain, frequency, hematuria, menstrual problem, urgency, vaginal bleeding, vaginal discharge and vaginal pain.   Musculoskeletal:  Positive for back pain and myalgias. Negative for arthralgias, joint swelling and neck pain.   Skin:  Negative for rash.   Allergic/Immunologic: Negative for environmental allergies, food allergies and immunocompromised state.   Neurological:  Negative for dizziness, seizures, syncope, weakness, light-headedness, numbness and headaches.   Hematological:  Negative for adenopathy. Does not bruise/bleed easily.   Psychiatric/Behavioral:  Negative for confusion, hallucinations, self-injury, sleep disturbance and suicidal ideas. The patient is not nervous/anxious.    Objective:     Vital Signs (Most Recent):  Temp: 97.6 °F (36.4 °C) (01/26/23 1502)  Pulse: (!) 115 (01/26/23 1702)  Resp: 11 (01/26/23 1702)  BP: 112/61 (01/26/23 1702)  SpO2: 99 % (01/26/23 1702)   Vital Signs (24h Range):  Temp:  [97.6 °F (36.4 °C)-98.7 °F " (37.1 °C)] 97.6 °F (36.4 °C)  Pulse:  [113-133] 115  Resp:  [9-35] 11  SpO2:  [91 %-100 %] 99 %  BP: (103-144)/(54-83) 112/61     Weight: 104 kg (229 lb 4.5 oz)  Body mass index is 35.91 kg/m².    Estimated Creatinine Clearance: 151.6 mL/min (based on SCr of 0.6 mg/dL).    Physical Exam  Vitals and nursing note reviewed.   Constitutional:       Appearance: She is well-developed.   HENT:      Head: Normocephalic and atraumatic.      Right Ear: External ear normal.      Left Ear: External ear normal.   Eyes:      General: No scleral icterus.        Right eye: No discharge.         Left eye: No discharge.      Conjunctiva/sclera: Conjunctivae normal.   Cardiovascular:      Rate and Rhythm: Normal rate and regular rhythm.      Heart sounds: Normal heart sounds. No murmur heard.    No friction rub. No gallop.   Pulmonary:      Effort: Pulmonary effort is normal. No respiratory distress.      Breath sounds: Normal breath sounds. No stridor. No wheezing or rales.   Abdominal:      General: Bowel sounds are normal.   Musculoskeletal:         General: No tenderness. Normal range of motion.   Skin:     General: Skin is warm and dry.   Neurological:      Mental Status: She is alert and oriented to person, place, and time.       Significant Labs: Blood Culture:   Recent Labs   Lab 01/10/23  0616 01/10/23  0621 01/10/23  2344 01/10/23  2348 01/20/23  1131   LABBLOO No growth after 5 days. No growth after 5 days. No growth after 5 days. No growth after 5 days. No growth after 5 days.  No growth after 5 days.       BMP:   Recent Labs   Lab 01/26/23  0208   *   *   K 4.6      CO2 22*   BUN 25*   CREATININE 0.6   CALCIUM 7.7*   MG 1.6       CBC:   Recent Labs   Lab 01/26/23  0208 01/26/23  0721 01/26/23  1312   WBC 8.82 7.16 7.73   HGB 6.6* 6.2* 7.5*   HCT 21.0* 20.6* 23.7*   * 167 138*       Wound Culture:   Recent Labs   Lab 11/15/22  1301 01/24/23  0914 01/24/23  0943   LABAERO ESCHERICHIA COLI  Few  *  ESCHERICHIA COLI ESBL  Few  *  ESCHERICHIA COLI  Rare  * ESCHERICHIA COLI ESBL  Few  *  Further report to follow         Significant Imaging: I have reviewed all pertinent imaging results/findings within the past 24 hours.

## 2023-01-26 NOTE — SUBJECTIVE & OBJECTIVE
Interval History: 1/26 :  doing well post op.  Transitioning to oral pain meds today. OR Wednesday.     Medications:  Continuous Infusions:   sodium chloride 0.9% 100 mL/hr at 01/26/23 0601    hydromorphone in 0.9 % NaCl 6 mg/30 ml       Scheduled Meds:   acetaminophen  1,000 mg Oral TID    buPROPion  300 mg Oral Daily    gabapentin  600 mg Oral TID    lactulose  20 g Oral TID    meropenem (MERREM) IVPB  2 g Intravenous Q8H    methocarbamoL  1,000 mg Oral QID    pantoprazole  40 mg Oral Daily    potassium chloride  20 mEq Oral Daily    vancomycin (VANCOCIN) IVPB  1,750 mg Intravenous Q12H     PRN Meds:sodium chloride, dextrose 10%, dextrose 10%, diazePAM, glucagon (human recombinant), glucose, glucose, hydrALAZINE, labetalol, magnesium hydroxide 400 mg/5 ml, melatonin, naloxone, oxyCODONE, polyethylene glycol, promethazine, sodium chloride 0.9%, Pharmacy to dose Vancomycin consult **AND** vancomycin - pharmacy to dose     Review of Systems  Objective:     Weight: 104 kg (229 lb 4.5 oz)  Body mass index is 35.91 kg/m².  Vital Signs (Most Recent):  Temp: 97.9 °F (36.6 °C) (01/26/23 0702)  Pulse: (!) 125 (01/26/23 0702)  Resp: 16 (01/26/23 0850)  BP: (!) 103/57 (01/26/23 0702)  SpO2: 100 % (01/26/23 0702) Vital Signs (24h Range):  Temp:  [97.9 °F (36.6 °C)-98.7 °F (37.1 °C)] 97.9 °F (36.6 °C)  Pulse:  [116-131] 125  Resp:  [9-30] 16  SpO2:  [93 %-100 %] 100 %  BP: (103-128)/(54-83) 103/57     Date 01/26/23 0700 - 01/27/23 0659   Shift 6985-7989 5386-7124 6958-6699 24 Hour Total   INTAKE   Shift Total(mL/kg)       OUTPUT   Urine(mL/kg/hr) 75   75   Shift Total(mL/kg) 75(0.7)   75(0.7)   Weight (kg) 104 104 104 104                            Neurosurgery Physical Exam  General: well developed, well nourished, no distress.   Head: normocephalic, atraumatic  Neurologic: Alert and oriented. Thought content appropriate.  GCS: Motor: 6/Verbal: 5/Eyes: 4 GCS Total: 15  Mental Status: Awake, Alert, Oriented x 4  Language: No  aphasia  Speech: No dysarthria  Cranial nerves: face symmetric, tongue midline, CN II-XII grossly intact.   Eyes: pupils equal, round, reactive to light with accommodation, EOMI, nystagmus.   Pulmonary: normal respirations, no signs of respiratory distress  Abdomen: soft, non-distended, not tender to palpation  Skin: Skin is warm, dry and intact.  Sensory: intact to light touch throughout     Motor Strength:Moves all extremities spontaneously with good tone.  Full strength upper and extremities. No abnormal movements seen.      Strength   Deltoids Triceps Biceps Wrist Extension Wrist Flexion Hand    Upper: R 5/5 5/5 5/5 5/5 5/5 5/5     L 5/5 5/5 5/5 5/5 5/5 5/5       Iliopsoas Quadriceps Knee  Flexion Tibialis  anterior Gastro- cnemius EHL   Lower: R 3/5 3/5 3/5 4-/5 4-/5 4-/5     L 3/5 3/5 3/5 4-/5 4-/5 4-/5      Reflexes:   DTR: 2+ symmetrically throughout.  Parker's: Negative.  Babinski's: Present bilaterally  Clonus: 2 beats bilateral lower extremity     Incision well healed       Significant Labs:  Recent Labs   Lab 01/24/23  1304 01/25/23  0108 01/26/23  0208   * 132* 111*   * 134* 135*   K 4.7 4.8 4.6    108 109   CO2 24 22* 22*   BUN 14 20 25*   CREATININE 0.6 0.5 0.6   CALCIUM 7.5* 8.2* 7.7*   MG  --  1.7 1.6       Recent Labs   Lab 01/25/23  0240 01/26/23  0208 01/26/23  0721   WBC 6.57 8.82 7.16   HGB 7.6* 6.6* 6.2*   HCT 23.7* 21.0* 20.6*    146* 167       Recent Labs   Lab 01/25/23  0108   INR 1.4*   APTT 25.0       Microbiology Results (last 7 days)       Procedure Component Value Units Date/Time    AFB Culture & Smear [[id number]] Collected: 01/24/23 0989    Order Status: Completed Specimen: Bone from Back Updated: 01/25/23 2127     AFB Culture & Smear Culture in progress     AFB CULTURE STAIN No acid fast bacilli seen.    Narrative:      4) Perispinal    AFB Culture & Smear [[id number]] Collected: 01/24/23 0943    Order Status: Completed Specimen: Bone from Back Updated:  01/25/23 2127     AFB Culture & Smear Culture in progress     AFB CULTURE STAIN No acid fast bacilli seen.    Narrative:      3) Perispinal    AFB Culture & Smear [[id number]] Collected: 01/24/23 0913    Order Status: Completed Specimen: Body Fluid from Back Updated: 01/25/23 2127     AFB Culture & Smear Culture in progress     AFB CULTURE STAIN No acid fast bacilli seen.    Narrative:      1) Perispinal    AFB Culture & Smear [[id number]] Collected: 01/24/23 0913    Order Status: Completed Specimen: Body Fluid from Back Updated: 01/25/23 2127     AFB Culture & Smear Culture in progress     AFB CULTURE STAIN No acid fast bacilli seen.    Narrative:      2) Perispinal    Blood culture [[id number]] Collected: 01/20/23 1131    Order Status: Completed Specimen: Blood Updated: 01/25/23 1412     Blood Culture, Routine No growth after 5 days.    Narrative:      Collection has been rescheduled by [facility] at 01/20/2023 11:03 Reason:   Patient unavailable in room with doctors   Collection has been rescheduled by [facility] at 01/20/2023 11:03 Reason:   Patient unavailable in room with doctors     Blood culture [[id number]] Collected: 01/20/23 1131    Order Status: Completed Specimen: Blood Updated: 01/25/23 1412     Blood Culture, Routine No growth after 5 days.    Narrative:      Collection has been rescheduled by [facility] at 01/20/2023 11:03 Reason:   Patient unavailable in room with doctors   Collection has been rescheduled by [facility] at 01/20/2023 11:03 Reason:   Patient unavailable in room with doctors     Aerobic culture [[id number]]  (Abnormal) Collected: 01/24/23 0914    Order Status: Completed Specimen: Wound from Back Updated: 01/25/23 1123     Aerobic Bacterial Culture GRAM NEGATIVE VINCENT  Rare  Identification and susceptibility pending      Narrative:      1) Perispinal    Aerobic culture [[id number]]  (Abnormal) Collected: 01/24/23 0914    Order Status: Completed Specimen: Wound from Back Updated: 01/25/23 1120     Aerobic Bacterial Culture  GRAM NEGATIVE VINCENT  Few  Identification and susceptibility pending      Narrative:      2) Perispinal    Aerobic culture [[id number]]  (Abnormal) Collected: 01/24/23 0943    Order Status: Completed Specimen: Bone from Back Updated: 01/25/23 1033     Aerobic Bacterial Culture GRAM NEGATIVE VINCENT  Few  Identification and susceptibility pending        GRAM NEGATIVE VINCENT  Rare  Identification and susceptibility pending      Narrative:      3) Perispinal    Aerobic culture [[id number]] Collected: 01/24/23 0943    Order Status: Completed Specimen: Bone from Back Updated: 01/25/23 0839     Aerobic Bacterial Culture No growth    Narrative:      4) Perispinal    Culture, Anaerobe [[id number]] Collected: 01/24/23 0913    Order Status: Completed Specimen: Body Fluid from Back Updated: 01/25/23 0658     Anaerobic Culture Culture in progress    Narrative:      1) Perispinal    Culture, Anaerobe [[id number]] Collected: 01/24/23 0943    Order Status: Completed Specimen: Bone from Back Updated: 01/25/23 0658     Anaerobic Culture Culture in progress    Narrative:      3) Perispinal    Culture, Anaerobe [[id number]] Collected: 01/24/23 0913    Order Status: Completed Specimen: Body Fluid from Back Updated: 01/25/23 0658     Anaerobic Culture Culture in progress    Narrative:      2) Perispinal    Culture, Anaerobe [[id number]] Collected: 01/24/23 0943    Order Status: Completed Specimen: Bone from Back Updated: 01/25/23 0658     Anaerobic Culture Culture in progress    Narrative:      4) Perispinal    Gram stain [[id number]] Collected: 01/24/23 0913    Order Status: Completed Specimen: Body Fluid from Back Updated: 01/24/23 1239     Gram Stain Result No WBC's      No organisms seen    Narrative:      1) Perispinal    Gram stain [[id number]] Collected: 01/24/23 0944    Order Status: Completed Specimen: Bone from Back Updated: 01/24/23 1238     Gram Stain Result Rare WBC's      No organisms seen    Narrative:      3) Perispinal    Gram stain  [[id number]] Collected: 01/24/23 0913    Order Status: Completed Specimen: Body Fluid from Back Updated: 01/24/23 1237     Gram Stain Result Rare WBC's      No organisms seen    Narrative:      2) Perispinal    Gram stain [[id number]] Collected: 01/24/23 0943    Order Status: Completed Specimen: Bone from Back Updated: 01/24/23 1236     Gram Stain Result No WBC's      No organisms seen    Narrative:      4) Perispinal    Fungus culture [[id number]] Collected: 01/24/23 0943    Order Status: Sent Specimen: Bone from Back Updated: 01/24/23 1009    Fungus culture [[id number]] Collected: 01/24/23 0943    Order Status: Sent Specimen: Bone from Back Updated: 01/24/23 1007    Fungus culture [[id number]] Collected: 01/24/23 0913    Order Status: Sent Specimen: Body Fluid from Back Updated: 01/24/23 0929    Fungus culture [[id number]] Collected: 01/24/23 0913    Order Status: Sent Specimen: Body Fluid from Back Updated: 01/24/23 0927    Urine culture [[id number]] Collected: 01/20/23 0130    Order Status: Completed Specimen: Urine Updated: 01/21/23 1144     Urine Culture, Routine Multiple organisms isolated. None in predominance.  Repeat if      clinically necessary.    Narrative:      Specimen Source->Urine    Blood culture [[id number]]     Order Status: Canceled Specimen: Blood     Culture, Respiratory with Gram Stain [[id number]]     Order Status: Canceled Specimen: Respiratory           All pertinent labs from the last 24 hours have been reviewed.    Significant Diagnostics:  CT C-spine:  Vertebrae: The dens, lateral masses, and occipital condyles are intact.  There is no evidence of fracture or dislocation.     Discs: Multilevel disc height loss and degenerative endplate change.  Prominent C6 inferior endplate Schmorl's node, similar in appearance dating back to MRI from 02/14/2022.     Degenerative changes: Sent spinal canal dimensions at C3-C4 are 10.5 mm, 8.5 mm at C4-C5, 10.0 mm at C5-C6, and 10.8 mm at C6-C7 .  Multilevel  uncovertebral spurring with multilevel neural foraminal narrowing most pronounced at C5-C6 with moderate right neural foraminal narrowing and C6-C7 with moderate left neural foraminal narrowing.    Physical Exam  Neurosurgery Physical Exam

## 2023-01-26 NOTE — PROCEDURES
"Rachel Zazueta is a 42 y.o. female patient.    Temp: 98.7 °F (37.1 °C) (01/26/23 1140)  Pulse: (!) 113 (01/26/23 1140)  Resp: 10 (01/26/23 1140)  BP: (!) 117/58 (01/26/23 1140)  SpO2: 99 % (01/26/23 1140)  Weight: 104 kg (229 lb 4.5 oz) (01/22/23 0600)  Height: 5' 7" (170.2 cm) (01/20/23 1044)    PICC  Date/Time: 1/26/2023 12:10 PM  Performed by: Jean Aguirre RN  Assisting provider: Florencia Moore RN  Consent Done: Yes  Time out: Immediately prior to procedure a time out was called to verify the correct patient, procedure, equipment, support staff and site/side marked as required  Indications: med administration and vascular access  Anesthesia: local infiltration  Local anesthetic: lidocaine 1% without epinephrine  Anesthetic Total (mL): 3  Description of findings: PICC  Preparation: skin prepped with ChloraPrep  Skin prep agent dried: skin prep agent completely dried prior to procedure  Sterile barriers: all five maximum sterile barriers used - cap, mask, sterile gown, sterile gloves, and large sterile sheet  Hand hygiene: hand hygiene performed prior to central venous catheter insertion  Location details: left basilic  Catheter type: double lumen  Catheter size: 5 Fr  Catheter Length: 39cm    Ultrasound guidance: yes  Vessel Caliber: medium and patent, compressibility normal  Vascular Doppler: not done  Needle advanced into vessel with real time Ultrasound guidance.  Guidewire confirmed in vessel.  Image recorded and saved.  Sterile sheath used.  no esophageal manometryNumber of attempts: 1  Post-procedure: blood return through all ports, chlorhexidine patch and sterile dressing applied  Technical procedures used: 3CG  Specimens: No  Implants: No  Assessment: placement verified by x-ray  Complications: none        Name   1/26/2023    "

## 2023-01-26 NOTE — SUBJECTIVE & OBJECTIVE
"Interval History: "In pain". S/P thoracic laminectomy with fusion and washout on 1/24. Operative cultures with GNR x 2 pending identification.     Review of Systems   Constitutional:  Negative for chills, fatigue, fever and unexpected weight change.   HENT:  Negative for ear pain, facial swelling, hearing loss, mouth sores, nosebleeds, rhinorrhea, sinus pressure, sore throat, tinnitus, trouble swallowing and voice change.    Eyes:  Negative for photophobia, pain, redness and visual disturbance.   Respiratory:  Negative for cough, chest tightness, shortness of breath and wheezing.    Cardiovascular:  Negative for chest pain, palpitations and leg swelling.   Gastrointestinal:  Negative for abdominal pain, blood in stool, constipation, diarrhea, nausea and vomiting.   Endocrine: Negative for cold intolerance, heat intolerance, polydipsia, polyphagia and polyuria.   Genitourinary:  Negative for decreased urine volume, dysuria, flank pain, frequency, hematuria, menstrual problem, urgency, vaginal bleeding, vaginal discharge and vaginal pain.   Musculoskeletal:  Positive for back pain and myalgias. Negative for arthralgias, joint swelling and neck pain.   Skin:  Negative for rash.   Allergic/Immunologic: Negative for environmental allergies, food allergies and immunocompromised state.   Neurological:  Negative for dizziness, seizures, syncope, weakness, light-headedness, numbness and headaches.   Hematological:  Negative for adenopathy. Does not bruise/bleed easily.   Psychiatric/Behavioral:  Negative for confusion, hallucinations, self-injury, sleep disturbance and suicidal ideas. The patient is not nervous/anxious.    Objective:     Vital Signs (Most Recent):  Temp: 98.7 °F (37.1 °C) (01/25/23 1901)  Pulse: (!) 122 (01/25/23 1901)  Resp: (!) 24 (01/25/23 2102)  BP: 113/70 (01/25/23 1901)  SpO2: 98 % (01/25/23 1901)   Vital Signs (24h Range):  Temp:  [97.3 °F (36.3 °C)-98.7 °F (37.1 °C)] 98.7 °F (37.1 °C)  Pulse:  " [112-131] 122  Resp:  [9-28] 24  SpO2:  [93 %-100 %] 98 %  BP: (113-132)/(56-84) 113/70     Weight: 104 kg (229 lb 4.5 oz)  Body mass index is 35.91 kg/m².    Estimated Creatinine Clearance: 181.9 mL/min (based on SCr of 0.5 mg/dL).    Physical Exam  Vitals and nursing note reviewed.   Constitutional:       Appearance: She is well-developed.   HENT:      Head: Normocephalic and atraumatic.      Right Ear: External ear normal.      Left Ear: External ear normal.   Eyes:      General: No scleral icterus.        Right eye: No discharge.         Left eye: No discharge.      Conjunctiva/sclera: Conjunctivae normal.   Cardiovascular:      Rate and Rhythm: Normal rate and regular rhythm.      Heart sounds: Normal heart sounds. No murmur heard.    No friction rub. No gallop.   Pulmonary:      Effort: Pulmonary effort is normal. No respiratory distress.      Breath sounds: Normal breath sounds. No stridor. No wheezing or rales.   Abdominal:      General: Bowel sounds are normal.   Musculoskeletal:         General: No tenderness. Normal range of motion.   Skin:     General: Skin is warm and dry.   Neurological:      Mental Status: She is alert and oriented to person, place, and time.       Significant Labs: Blood Culture:   Recent Labs   Lab 01/10/23  0616 01/10/23  0621 01/10/23  2344 01/10/23  2348 01/20/23  1131   LABBLOO No growth after 5 days. No growth after 5 days. No growth after 5 days. No growth after 5 days. No growth after 5 days.  No growth after 5 days.     BMP:   Recent Labs   Lab 01/25/23  0108   *   *   K 4.8      CO2 22*   BUN 20   CREATININE 0.5   CALCIUM 8.2*   MG 1.7     CBC:   Recent Labs   Lab 01/24/23  0401 01/24/23  0929 01/24/23  1135 01/24/23  1304 01/25/23  0240   WBC 2.35*  --   --  10.34 6.57   HGB 9.5*  --   --  7.9* 7.6*   HCT 31.2*   < > 27* 26.2* 23.7*   *  --   --  190 158    < > = values in this interval not displayed.     Wound Culture:   Recent Labs   Lab  11/15/22  1301 01/24/23  0914 01/24/23  0943   LABAERO ESCHERICHIA COLI  Few  * GRAM NEGATIVE VINCENT  Rare  Identification and susceptibility pending  *  GRAM NEGATIVE VINCENT  Few  Identification and susceptibility pending  * GRAM NEGATIVE VINCENT  Few  Identification and susceptibility pending  *  GRAM NEGATIVE VINCENT  Rare  Identification and susceptibility pending  *  No growth       Significant Imaging: I have reviewed all pertinent imaging results/findings within the past 24 hours.

## 2023-01-26 NOTE — SUBJECTIVE & OBJECTIVE
Past Medical History:   Diagnosis Date    Anemia     Anxiety     Depressed     Diskitis     Hep C w/o coma, chronic     IV drug abuse     Kidney stone     Liver cirrhosis      Past Surgical History:   Procedure Laterality Date    ARTHROTOMY OF SHOULDER Left 11/15/2022    Procedure: ARTHROTOMY, SHOULDER;  Surgeon: Bjorn Hayes MD;  Location: [facility];  Service: Orthopedics;  Laterality: Left;  Left acromioclavicular joint arthrotomy    BACK SURGERY      benine tumor removal      forehead, age 9    CHOLECYSTECTOMY      KIDNEY SURGERY      LUMBAR FUSION Bilateral 3/2/2022    Procedure: FUSION, SPINE, LUMBAR;  Surgeon: Guille Templeton MD;  Location: [address];  Service: Neurosurgery;  Laterality: Bilateral;  AIRO  T10--Pelvis    LUMBAR FUSION N/A 1/24/2023    Procedure: **AIRO** T8-T12 POSTERIOR FUSION, T8-T10 LAMINECTOMY, HARDWARE REMOVAL AND WASHOUT;  Surgeon: Guille Templeton MD;  Location: [address];  Service: Neurosurgery;  Laterality: N/A;    REMOVAL OF HARDWARE FROM SPINE N/A 2/21/2022    Procedure: REMOVAL, HARDWARE, SPINE;  Surgeon: Guille Templeton MD;  Location: [address];  Service: Neurosurgery;  Laterality: N/A;  Washout    SPINE SURGERY        No current facility-administered medications on file prior to encounter.     Current Outpatient Medications on File Prior to Encounter   Medication Sig Dispense Refill    albuterol (ACCUNEB) 1.25 mg/3 mL Nebu Take 3 mLs (1.25 mg total) by nebulization every 6 (six) hours as needed (shortness of breath). Rescue 75 mL 0    buPROPion (WELLBUTRIN) 100 MG tablet Take 1 tablet (100 mg total) by mouth 2 (two) times daily. 60 tablet 2    folic acid (FOLVITE) 1 MG tablet Take 1 tablet (1 mg total) by mouth once daily. (Patient not taking: Reported on 11/25/2022) 42 tablet 0    gabapentin (NEURONTIN) 300 MG capsule Take 1 capsule (300 mg total) by mouth 3 (three) times daily. 90 capsule 11    lactulose (CHRONULAC) 20 gram/30 mL Soln Take 30 mLs (20 g total) by  mouth 3 (three) times daily. 2700 mL 2    pantoprazole (PROTONIX) 40 MG tablet Take 1 tablet (40 mg total) by mouth once daily. 30 tablet 1    [DISCONTINUED] diclofenac sodium (VOLTAREN) 1 % Gel Apply 2 g topically 4 (four) times daily. 50 g 0      Allergies: Bee venom protein (honey bee), Naproxen, Wasp sting [allergen ext-venom-honey bee], Adhesive, Iodine and iodide containing products, Shellfish containing products, and Nuts [tree nut]    Family History   Problem Relation Age of Onset    Hepatitis Mother     Drug abuse Mother     Liver cancer Mother     Drug abuse Father     Cirrhosis Father     Hepatitis Father        Subjective:     Interval History: As above.     Review of Systems   Constitutional:  Negative for fever.   HENT:  Negative for congestion and rhinorrhea.    Eyes:  Negative for visual disturbance.   Respiratory:  Negative for shortness of breath.    Cardiovascular:  Negative for chest pain.   Gastrointestinal:  Negative for abdominal distention, abdominal pain, nausea and vomiting.   Genitourinary:  Negative for dysuria and hematuria.   Musculoskeletal:  Positive for back pain. Negative for neck pain.   Neurological:  Positive for weakness. Negative for dizziness, seizures, light-headedness, numbness and headaches.     Objective:     Vitals:    Temp: 98.7 °F (37.1 °C)  Pulse: (!) 118  Rhythm: sinus tachycardia  BP: (!) 122/58  MAP (mmHg): 83  Resp: (!) 21  ETCO2 (mmHg): 35 mmHg  SpO2: 99 %    Temp  Min: 97.9 °F (36.6 °C)  Max: 98.7 °F (37.1 °C)  Pulse  Min: 113  Max: 133  BP  Min: 103/57  Max: 144/59  MAP (mmHg)  Min: 75  Max: 95  Resp  Min: 9  Max: 35  ETCO2 (mmHg)  Min: 31 mmHg  Max: 41 mmHg  SpO2  Min: 91 %  Max: 100 %    01/25 0701 - 01/26 0700  In: 5757.4 [I.V.:3712.1]  Out: 1970 [Urine:1410; Drains:560]   Unmeasured Output  Urine Occurrence: 1  Stool Occurrence: 0       Physical Exam  Vitals and nursing note reviewed.   Constitutional:       General: She is not in acute distress.      Appearance: She is obese. She is not ill-appearing, toxic-appearing or diaphoretic.   HENT:      Head: Normocephalic.      Mouth/Throat:      Mouth: Mucous membranes are moist.   Eyes:      General: No scleral icterus.        Right eye: No discharge.         Left eye: No discharge.   Cardiovascular:      Rate and Rhythm: Normal rate and regular rhythm.   Pulmonary:      Effort: Pulmonary effort is normal. No respiratory distress.      Comments: NC in place  Abdominal:      General: Abdomen is flat.      Palpations: Abdomen is soft.      Tenderness: There is no abdominal tenderness.   Musculoskeletal:         General: No deformity.      Cervical back: No rigidity.   Skin:     General: Skin is warm and dry.      Coloration: Skin is not jaundiced.   Neurological:      Mental Status: She is alert and oriented to person, place, and time. Mental status is at baseline.      Motor: Weakness (Bilateral LE) and tremor (Bilateral UE) present.      Comments:   E4 V5 M6  Awake, alert, and oriented to self, time, place, and situation.   Answering all questions appropriately and following all commands briskly.   No facial droop. EOMI. PERRL. Peripheral vision intact bilaterally.   FOWLER spontaneously and follows commands  RUE: 3/5  LUE: 3/5  RLE: 2/5  LLE: 2/5  Sensation intact     Psychiatric:         Mood and Affect: Mood normal.         Behavior: Behavior normal.       Today I personally reviewed pertinent medications, imaging, laboratory results, microbiology results,     Hg 6.6 --> 6.2

## 2023-01-26 NOTE — PROGRESS NOTES
Therapy with vancomycin complete and consult discontinued by provider. Pharmacy will sign off, please re-consult as needed.    Yumiko Hu, PharmD, [facility]  Neurocritical Care Pharmacist  [phone]

## 2023-01-26 NOTE — PLAN OF CARE
[facility] Care Plan    POC reviewed with Rachel Zazueta at 1400. Pt verbalized understanding. Questions and concerns addressed. No acute events today. Pt progressing toward goals. Will continue to monitor. See below and flowsheets for full assessment and VS info.       - 1unit RBCs given  - Right IJ removed  - ALANNA PICC placed  - Right Hemovac to gravity     Is this a stroke patient? no    Neuro:  [place] Coma Scale  Best Eye Response: 3-->(E3) to speech  Best Motor Response: 6-->(M6) obeys commands  Best Verbal Response: 5-->(V5) oriented  [place]riscoll Coma Scale Score: 14  Assessment Qualifiers: patient not sedated/intubated  Pupil PERRLA: yes     24 hr Temp:  [97.6 °F (36.4 °C)-98.7 °F (37.1 °C)]     CV:   Rhythm: sinus tachycardia  BP goals:   SBP < 140  MAP > 65    Resp:      Oxygen Concentration (%): 24    Plan:  1L NC    GI/:     Diet/Nutrition Received: regular  Last Bowel Movement: 01/23/23  Voiding Characteristics: urethral catheter (bladder)    Intake/Output Summary (Last 24 hours) at 1/26/2023 1751  Last data filed at 1/26/2023 1737  Gross per 24 hour   Intake 3498.94 ml   Output 2245 ml   Net 1253.94 ml     Unmeasured Output  Urine Occurrence: 1  Stool Occurrence: 0    Labs/Accuchecks:  Recent Labs   Lab 01/26/23  1312   WBC 7.73   RBC 2.65*   HGB 7.5*   HCT 23.7*   *      Recent Labs   Lab 01/26/23  0208   *   K 4.6   CO2 22*      BUN 25*   CREATININE 0.6   ALKPHOS 81   ALT 25   AST 42*   BILITOT 1.7*      Recent Labs   Lab 01/25/23  0108   INR 1.4*   APTT 25.0      Recent Labs   Lab 01/20/23  0543   CPK 48       Electrolytes: No replacement orders  Accuchecks: none    Gtts:   hydromorphone in 0.9 % NaCl 6 mg/30 ml         LDA/Wounds:  Lines/Drains/Airways       Peripherally Inserted Central Catheter Line  Duration             PICC Double Lumen 01/26/23 1209 left basilic <1 day              Drain  Duration                  Closed/Suction Drain 01/24/23 Posterior Back Accordion 10 Fr. 2  days         Closed/Suction Drain 01/24/23 Posterior;Right Back Accordion 10 Fr. 2 days         Urethral Catheter 01/24/23 0832 Non-latex;Straight-tip 16 Fr. 2 days              Peripheral Intravenous Line  Duration                  Midline Catheter Insertion/Assessment  - Single Lumen 01/12/23 2138 Right basilic vein (medial side of arm) 18g x 10cm 13 days                  Wounds: No  Wound care consulted: No

## 2023-01-26 NOTE — PLAN OF CARE
[facility] Care Plan    POC reviewed with Rachel Zazueta at 0300. Pt verbalized understanding. Questions and concerns addressed. No acute events overnight. Pt progressing toward goals. Will continue to monitor. See below and flowsheets for full assessment and VS info.     - Pt continues to use PCA pump for pain management with significant relief   - Bath given and linens changed  - Valium given x1 for mm spasms  - Increased mobility noted in bilateral LE  - Pt c/o increased sweating o/n. Afebrile. Fan in use.       Is this a stroke patient? no    Neuro:  [place] Coma Scale  Best Eye Response: 4-->(E4) spontaneous  Best Motor Response: 6-->(M6) obeys commands  Best Verbal Response: 5-->(V5) oriented  [place] Coma Scale Score: 15  Assessment Qualifiers: patient not sedated/intubated, no eye obstruction present  Pupil PERRLA: yes     24hr Temp:  [98.2 °F (36.8 °C)-98.7 °F (37.1 °C)]     CV:   Rhythm: normal sinus rhythm  BP goals:   SBP < 140  MAP > 65    Resp:      Oxygen Concentration (%): 24    Plan:  1LNC to maintain sats >95% while sleeping    GI/:     Diet/Nutrition Received: regular  Last Bowel Movement: 01/23/23  Voiding Characteristics: urethral catheter (bladder)    Intake/Output Summary (Last 24 hours) at 1/26/2023 0317  Last data filed at 1/26/2023 0201  Gross per 24 hour   Intake 5610.96 ml   Output 1805 ml   Net 3805.96 ml     Unmeasured Output  Urine Occurrence: 1  Stool Occurrence: 0    Labs/Accuchecks:  Recent Labs   Lab 01/26/23  0208   WBC 8.82   RBC 2.37*   HGB 6.6*   HCT 21.0*   *      Recent Labs   Lab 01/26/23  0208   *   K 4.6   CO2 22*      BUN 25*   CREATININE 0.6   ALKPHOS 81   ALT 25   AST 42*   BILITOT 1.7*      Recent Labs   Lab 01/25/23  0108   INR 1.4*   APTT 25.0      Recent Labs   Lab 01/20/23  0543   CPK 48       Electrolytes: No replacement orders  Accuchecks: none    Gtts:   sodium chloride 0.9% 100 mL/hr at 01/26/23 0201    hydromorphone in 0.9 % NaCl 6 mg/30 ml          LDA/Wounds:  Lines/Drains/Airways       Central Venous Catheter Line  Duration             Percutaneous Central Line Insertion/Assessment - Triple Lumen  01/24/23 0900 right internal jugular 1 day              Drain  Duration                  Closed/Suction Drain 01/24/23 Posterior Back Accordion 10 Fr. 2 days         Closed/Suction Drain 01/24/23 Posterior;Right Back Accordion 10 Fr. 2 days         Urethral Catheter 01/24/23 0832 Non-latex;Straight-tip 16 Fr. 1 day              Peripheral Intravenous Line  Duration                  Midline Catheter Insertion/Assessment  - Single Lumen 01/12/23 2138 Right basilic vein (medial side of arm) 18g x 10cm 13 days                  Wounds: No  Wound care consulted: No

## 2023-01-26 NOTE — ASSESSMENT & PLAN NOTE
Thoracic discitis s/p laminectomy on 1/24. Operative cultures with E coli and ESBL E coli. Pending further surgical intervention on 2/1.   · Continue meropenem.   · Will follow up operative cultures.  · Will likely transition to ertapenem after her stage 2 intervention.   · Patient is NOT a candidate for home IV antibiotics due to lack of transportation, inability to follow up in clinic, and recent relapse with illicit drugs. I recommend patient be placed in SNF or LTAC once ready for discharge in order to complete her antibiotic therapy. I have discussed this with the patient at bedside today.    Georgette Nugent(Attending)

## 2023-01-26 NOTE — ASSESSMENT & PLAN NOTE
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  ID following  Blood cx (1/20) NGTD  Pending intra-operative cultures   Continue Vancomycin and meropenem  Daily CBC

## 2023-01-26 NOTE — PLAN OF CARE
[facility] - Neuro Critical Care  Discharge Reassessment    Primary Care Provider: Dannielle Merino DO    Expected Discharge Date: 2/3/2023    Per MD: Interval History: 1/26 :  doing well post op.  Transitioning to oral pain meds today. OR Wednesday.   Patient not medically ready for discharge.     [facility] has accepted patient and is following.  [facility] has also accepted patient    Reassessment (most recent)       Discharge Reassessment - 01/26/23 1601          Discharge Reassessment    Assessment Type Discharge Planning Reassessment     Did the patient's condition or plan change since previous assessment? No     Communicated SHIRA with patient/caregiver Date not available/Unable to determine     Discharge Plan A Long-term acute care facility (LTAC)     Discharge Plan B Rehab     DME Needed Upon Discharge  other (see comments)   tbd    Discharge Barriers Identified None     Why the patient remains in the hospital Requires continued medical care                     Ernestina Elizabeth RN, CCRN-K, [facility]  Neuro-Critical Care   [phone]

## 2023-01-26 NOTE — ASSESSMENT & PLAN NOTE
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 [place] [facility] [place]  and [place]surgery was consulted    OR yesterday for temporary T6-12 PSIF. Taz pus noted intraoperatively.   Given intraoperative findings, new operative plan for T4-pelvis revision and extension of fusion with T9-10 corpectomy with plastic surgery assistance planned for 2/1/23    Plan:  Admitted to ICU, Keep in ICU on logroll precautions until stage 2 Wednesday.    -- MRI C/T/L spine 1/20 with C7 SP enhancement, focal kyphosis at T8-10 with severe anterior height loss at T9, enhancing C6 inferior endplate Schmorl's node  -- CT T/L spine thin cut 1/20 with haloing of pelvic and T10 screws, spondylodiscitis T8-9, T9-10  -- CT C spine with concern for discitis at C6-7  -- flex ext XR done, limited view of C6-7 in swimmers view 2/2 shoulders  -- 1/20 ESR 70, CRP 9.4. elevated from prior   -- TLSO at bedside  --HV x2 to gravity, WV in place, monitor ouput  --ID: Vanc/merropenam   -- BCx 1/20: NGTD   -- OR cultures 1/24 gram stain negative, cultures pending  --OR 2/1/23 as above   -- will obtain informed consent   -- NPO 1/31/23 MN  -- multimodal pain control per primary medicine team  NSGY will continue to follow. Please contact team with any further questions.

## 2023-01-26 NOTE — ASSESSMENT & PLAN NOTE
41 yo F w/ history of multiple spinal surgeries presented to OSH with possible infection of patient's shoulder and found to have UTI and E. Coli bacteremia and progressively worse B/L LE weakness. Transferred to C for neurosurgical evaluation. Underwent Laminectomy T8-T10; Posterior spinal fusion T8-T12; hardware removal and washout. Admitted to [facility] for HLOC. Arrived to unit on PCA dilaudid pump. Patient is HDS on 3L NC.     -Neuro checks q2hr  -Vital signs q2hr  -NSGY following  -MAP goals >65  -SBP goals <180  -PRN labetalol, hydralazine  -Patient will need to lie flat with HOB 15-20  -Started on Vancomycin and meropenem, PICC consulted  -MM pain regimen; PCA Dilaudid pump for pain management  -TSH, Lipid panel, and A1C pending  -PT/INR, aPTT   -CBC, CMP, Mag, Phos daily   -Transfuse for Hg<7  -PT/OT

## 2023-01-27 LAB
ABO + RH BLD: NORMAL
ALBUMIN SERPL BCP-MCNC: 1.9 G/DL (ref 3.5–5.2)
ALP SERPL-CCNC: 93 U/L (ref 55–135)
ALT SERPL W/O P-5'-P-CCNC: 29 U/L (ref 10–44)
ANION GAP SERPL CALC-SCNC: 3 MMOL/L (ref 8–16)
AST SERPL-CCNC: 48 U/L (ref 10–40)
BASOPHILS # BLD AUTO: 0.03 K/UL (ref 0–0.2)
BASOPHILS # BLD AUTO: 0.04 K/UL (ref 0–0.2)
BASOPHILS # BLD AUTO: 0.05 K/UL (ref 0–0.2)
BASOPHILS NFR BLD: 0.4 % (ref 0–1.9)
BASOPHILS NFR BLD: 0.6 % (ref 0–1.9)
BASOPHILS NFR BLD: 0.8 % (ref 0–1.9)
BILIRUB SERPL-MCNC: 1.9 MG/DL (ref 0.1–1)
BLD GP AB SCN CELLS X3 SERPL QL: NORMAL
BLD PROD TYP BPU: NORMAL
BLOOD UNIT EXPIRATION DATE: NORMAL
BLOOD UNIT TYPE CODE: 6200
BLOOD UNIT TYPE: NORMAL
BUN SERPL-MCNC: 19 MG/DL (ref 6–20)
CALCIUM SERPL-MCNC: 7.9 MG/DL (ref 8.7–10.5)
CHLORIDE SERPL-SCNC: 105 MMOL/L (ref 95–110)
CO2 SERPL-SCNC: 23 MMOL/L (ref 23–29)
CODING SYSTEM: NORMAL
CREAT SERPL-MCNC: 0.5 MG/DL (ref 0.5–1.4)
DIFFERENTIAL METHOD: ABNORMAL
DISPENSE STATUS: NORMAL
EOSINOPHIL # BLD AUTO: 0.2 K/UL (ref 0–0.5)
EOSINOPHIL # BLD AUTO: 0.2 K/UL (ref 0–0.5)
EOSINOPHIL # BLD AUTO: 0.3 K/UL (ref 0–0.5)
EOSINOPHIL NFR BLD: 2.8 % (ref 0–8)
EOSINOPHIL NFR BLD: 3.5 % (ref 0–8)
EOSINOPHIL NFR BLD: 3.8 % (ref 0–8)
ERYTHROCYTE [DISTWIDTH] IN BLOOD BY AUTOMATED COUNT: 17.1 % (ref 11.5–14.5)
ERYTHROCYTE [DISTWIDTH] IN BLOOD BY AUTOMATED COUNT: 17.2 % (ref 11.5–14.5)
ERYTHROCYTE [DISTWIDTH] IN BLOOD BY AUTOMATED COUNT: 17.2 % (ref 11.5–14.5)
EST. GFR  (NO RACE VARIABLE): >60 ML/MIN/1.73 M^2
GLUCOSE SERPL-MCNC: 114 MG/DL (ref 70–110)
HCT VFR BLD AUTO: 21.7 % (ref 37–48.5)
HCT VFR BLD AUTO: 22.5 % (ref 37–48.5)
HCT VFR BLD AUTO: 22.7 % (ref 37–48.5)
HGB BLD-MCNC: 6.8 G/DL (ref 12–16)
HGB BLD-MCNC: 7.2 G/DL (ref 12–16)
HGB BLD-MCNC: 7.2 G/DL (ref 12–16)
IMM GRANULOCYTES # BLD AUTO: 0.09 K/UL (ref 0–0.04)
IMM GRANULOCYTES # BLD AUTO: 0.1 K/UL (ref 0–0.04)
IMM GRANULOCYTES # BLD AUTO: 0.11 K/UL (ref 0–0.04)
IMM GRANULOCYTES NFR BLD AUTO: 1.3 % (ref 0–0.5)
IMM GRANULOCYTES NFR BLD AUTO: 1.4 % (ref 0–0.5)
IMM GRANULOCYTES NFR BLD AUTO: 1.8 % (ref 0–0.5)
LYMPHOCYTES # BLD AUTO: 0.9 K/UL (ref 1–4.8)
LYMPHOCYTES # BLD AUTO: 1 K/UL (ref 1–4.8)
LYMPHOCYTES # BLD AUTO: 1.1 K/UL (ref 1–4.8)
LYMPHOCYTES NFR BLD: 12.9 % (ref 18–48)
LYMPHOCYTES NFR BLD: 14.9 % (ref 18–48)
LYMPHOCYTES NFR BLD: 15.7 % (ref 18–48)
MAGNESIUM SERPL-MCNC: 1.5 MG/DL (ref 1.6–2.6)
MAGNESIUM SERPL-MCNC: 1.6 MG/DL (ref 1.6–2.6)
MCH RBC QN AUTO: 28.3 PG (ref 27–31)
MCH RBC QN AUTO: 28.6 PG (ref 27–31)
MCH RBC QN AUTO: 28.8 PG (ref 27–31)
MCHC RBC AUTO-ENTMCNC: 31.3 G/DL (ref 32–36)
MCHC RBC AUTO-ENTMCNC: 31.7 G/DL (ref 32–36)
MCHC RBC AUTO-ENTMCNC: 32 G/DL (ref 32–36)
MCV RBC AUTO: 89 FL (ref 82–98)
MCV RBC AUTO: 90 FL (ref 82–98)
MCV RBC AUTO: 91 FL (ref 82–98)
MONOCYTES # BLD AUTO: 0.7 K/UL (ref 0.3–1)
MONOCYTES # BLD AUTO: 0.8 K/UL (ref 0.3–1)
MONOCYTES # BLD AUTO: 0.9 K/UL (ref 0.3–1)
MONOCYTES NFR BLD: 12.1 % (ref 4–15)
MONOCYTES NFR BLD: 12.2 % (ref 4–15)
MONOCYTES NFR BLD: 9.4 % (ref 4–15)
NEUTROPHILS # BLD AUTO: 4.1 K/UL (ref 1.8–7.7)
NEUTROPHILS # BLD AUTO: 4.8 K/UL (ref 1.8–7.7)
NEUTROPHILS # BLD AUTO: 5.1 K/UL (ref 1.8–7.7)
NEUTROPHILS NFR BLD: 65.7 % (ref 38–73)
NEUTROPHILS NFR BLD: 68.3 % (ref 38–73)
NEUTROPHILS NFR BLD: 72.4 % (ref 38–73)
NRBC BLD-RTO: 1 /100 WBC
PHOSPHATE SERPL-MCNC: 1.9 MG/DL (ref 2.7–4.5)
PHOSPHATE SERPL-MCNC: 2.4 MG/DL (ref 2.7–4.5)
PLATELET # BLD AUTO: 102 K/UL (ref 150–450)
PLATELET # BLD AUTO: 114 K/UL (ref 150–450)
PLATELET # BLD AUTO: 121 K/UL (ref 150–450)
PMV BLD AUTO: 8.9 FL (ref 9.2–12.9)
PMV BLD AUTO: 9.4 FL (ref 9.2–12.9)
PMV BLD AUTO: 9.8 FL (ref 9.2–12.9)
POTASSIUM SERPL-SCNC: 4.6 MMOL/L (ref 3.5–5.1)
PROT SERPL-MCNC: 5.4 G/DL (ref 6–8.4)
RBC # BLD AUTO: 2.4 M/UL (ref 4–5.4)
RBC # BLD AUTO: 2.5 M/UL (ref 4–5.4)
RBC # BLD AUTO: 2.52 M/UL (ref 4–5.4)
SODIUM SERPL-SCNC: 131 MMOL/L (ref 136–145)
TRANS ERYTHROCYTES VOL PATIENT: NORMAL ML
WBC # BLD AUTO: 6.24 K/UL (ref 3.9–12.7)
WBC # BLD AUTO: 7.05 K/UL (ref 3.9–12.7)
WBC # BLD AUTO: 7.11 K/UL (ref 3.9–12.7)

## 2023-01-27 PROCEDURE — 83735 ASSAY OF MAGNESIUM: CPT

## 2023-01-27 PROCEDURE — 25000003 PHARM REV CODE 250: Performed by: STUDENT IN AN ORGANIZED HEALTH CARE EDUCATION/TRAINING PROGRAM

## 2023-01-27 PROCEDURE — A4216 STERILE WATER/SALINE, 10 ML: HCPCS | Performed by: PSYCHIATRY & NEUROLOGY

## 2023-01-27 PROCEDURE — 99291 PR CRITICAL CARE, E/M 30-74 MINUTES: ICD-10-PCS | Mod: ,,, | Performed by: PSYCHIATRY & NEUROLOGY

## 2023-01-27 PROCEDURE — 99291 CRITICAL CARE FIRST HOUR: CPT | Mod: ,,, | Performed by: PSYCHIATRY & NEUROLOGY

## 2023-01-27 PROCEDURE — 25000003 PHARM REV CODE 250

## 2023-01-27 PROCEDURE — 84100 ASSAY OF PHOSPHORUS: CPT

## 2023-01-27 PROCEDURE — 94761 N-INVAS EAR/PLS OXIMETRY MLT: CPT

## 2023-01-27 PROCEDURE — 27000207 HC ISOLATION

## 2023-01-27 PROCEDURE — 63600175 PHARM REV CODE 636 W HCPCS

## 2023-01-27 PROCEDURE — 36430 TRANSFUSION BLD/BLD COMPNT: CPT

## 2023-01-27 PROCEDURE — 27000221 HC OXYGEN, UP TO 24 HOURS

## 2023-01-27 PROCEDURE — 99233 SBSQ HOSP IP/OBS HIGH 50: CPT | Mod: ,,, | Performed by: INTERNAL MEDICINE

## 2023-01-27 PROCEDURE — P9021 RED BLOOD CELLS UNIT: HCPCS

## 2023-01-27 PROCEDURE — 20000000 HC ICU ROOM

## 2023-01-27 PROCEDURE — 25000003 PHARM REV CODE 250: Performed by: INTERNAL MEDICINE

## 2023-01-27 PROCEDURE — 87040 BLOOD CULTURE FOR BACTERIA: CPT | Mod: 59 | Performed by: STUDENT IN AN ORGANIZED HEALTH CARE EDUCATION/TRAINING PROGRAM

## 2023-01-27 PROCEDURE — 85025 COMPLETE CBC W/AUTO DIFF WBC: CPT

## 2023-01-27 PROCEDURE — 51798 US URINE CAPACITY MEASURE: CPT

## 2023-01-27 PROCEDURE — 25000003 PHARM REV CODE 250: Performed by: PSYCHIATRY & NEUROLOGY

## 2023-01-27 PROCEDURE — 63600175 PHARM REV CODE 636 W HCPCS: Performed by: STUDENT IN AN ORGANIZED HEALTH CARE EDUCATION/TRAINING PROGRAM

## 2023-01-27 PROCEDURE — 99233 PR SUBSEQUENT HOSPITAL CARE,LEVL III: ICD-10-PCS | Mod: ,,, | Performed by: INTERNAL MEDICINE

## 2023-01-27 RX ORDER — AMOXICILLIN 250 MG
2 CAPSULE ORAL 2 TIMES DAILY
Status: DISCONTINUED | OUTPATIENT
Start: 2023-01-27 | End: 2023-02-06

## 2023-01-27 RX ORDER — ONDANSETRON 2 MG/ML
4 INJECTION INTRAMUSCULAR; INTRAVENOUS ONCE
Status: COMPLETED | OUTPATIENT
Start: 2023-01-27 | End: 2023-01-27

## 2023-01-27 RX ORDER — PROCHLORPERAZINE EDISYLATE 5 MG/ML
2.5 INJECTION INTRAMUSCULAR; INTRAVENOUS EVERY 6 HOURS PRN
Status: DISCONTINUED | OUTPATIENT
Start: 2023-01-27 | End: 2023-02-28 | Stop reason: HOSPADM

## 2023-01-27 RX ORDER — LANOLIN ALCOHOL/MO/W.PET/CERES
800 CREAM (GRAM) TOPICAL
Status: DISCONTINUED | OUTPATIENT
Start: 2023-01-27 | End: 2023-02-08

## 2023-01-27 RX ORDER — AMOXICILLIN 250 MG
1 CAPSULE ORAL 2 TIMES DAILY
Status: DISCONTINUED | OUTPATIENT
Start: 2023-01-27 | End: 2023-01-27

## 2023-01-27 RX ORDER — SODIUM,POTASSIUM PHOSPHATES 280-250MG
2 POWDER IN PACKET (EA) ORAL
Status: DISCONTINUED | OUTPATIENT
Start: 2023-01-27 | End: 2023-02-08

## 2023-01-27 RX ORDER — POLYETHYLENE GLYCOL 3350 17 G/17G
17 POWDER, FOR SOLUTION ORAL 2 TIMES DAILY
Status: DISCONTINUED | OUTPATIENT
Start: 2023-01-27 | End: 2023-02-06

## 2023-01-27 RX ORDER — HYDROCODONE BITARTRATE AND ACETAMINOPHEN 500; 5 MG/1; MG/1
TABLET ORAL
Status: DISCONTINUED | OUTPATIENT
Start: 2023-01-27 | End: 2023-01-29

## 2023-01-27 RX ORDER — ONDANSETRON 2 MG/ML
4 INJECTION INTRAMUSCULAR; INTRAVENOUS EVERY 6 HOURS PRN
Status: DISCONTINUED | OUTPATIENT
Start: 2023-01-27 | End: 2023-02-28 | Stop reason: HOSPADM

## 2023-01-27 RX ADMIN — LACTULOSE 20 G: 20 SOLUTION ORAL at 09:01

## 2023-01-27 RX ADMIN — PANTOPRAZOLE SODIUM 40 MG: 40 TABLET, DELAYED RELEASE ORAL at 08:01

## 2023-01-27 RX ADMIN — OXYCODONE HYDROCHLORIDE 5 MG: 5 TABLET ORAL at 09:01

## 2023-01-27 RX ADMIN — Medication 10 ML: at 06:01

## 2023-01-27 RX ADMIN — Medication 10 ML: at 12:01

## 2023-01-27 RX ADMIN — Medication 800 MG: at 02:01

## 2023-01-27 RX ADMIN — Medication: at 08:01

## 2023-01-27 RX ADMIN — OXYCODONE HYDROCHLORIDE 5 MG: 5 TABLET ORAL at 05:01

## 2023-01-27 RX ADMIN — ONDANSETRON 4 MG: 2 INJECTION INTRAMUSCULAR; INTRAVENOUS at 06:01

## 2023-01-27 RX ADMIN — ONDANSETRON 4 MG: 2 INJECTION INTRAMUSCULAR; INTRAVENOUS at 12:01

## 2023-01-27 RX ADMIN — POLYETHYLENE GLYCOL 3350 17 G: 17 POWDER, FOR SOLUTION ORAL at 09:01

## 2023-01-27 RX ADMIN — DIAZEPAM 5 MG: 5 TABLET ORAL at 10:01

## 2023-01-27 RX ADMIN — METHOCARBAMOL 1000 MG: 500 TABLET ORAL at 05:01

## 2023-01-27 RX ADMIN — ONDANSETRON 4 MG: 2 INJECTION INTRAMUSCULAR; INTRAVENOUS at 05:01

## 2023-01-27 RX ADMIN — GABAPENTIN 600 MG: 300 CAPSULE ORAL at 09:01

## 2023-01-27 RX ADMIN — DIAZEPAM 5 MG: 5 TABLET ORAL at 08:01

## 2023-01-27 RX ADMIN — LACTULOSE 20 G: 20 SOLUTION ORAL at 08:01

## 2023-01-27 RX ADMIN — Medication 6 MG: at 12:01

## 2023-01-27 RX ADMIN — POTASSIUM & SODIUM PHOSPHATES POWDER PACK 280-160-250 MG 2 PACKET: 280-160-250 PACK at 06:01

## 2023-01-27 RX ADMIN — PROCHLORPERAZINE EDISYLATE 2.5 MG: 5 INJECTION INTRAMUSCULAR; INTRAVENOUS at 05:01

## 2023-01-27 RX ADMIN — GABAPENTIN 600 MG: 300 CAPSULE ORAL at 04:01

## 2023-01-27 RX ADMIN — MEROPENEM 2 G: 1 INJECTION INTRAVENOUS at 08:01

## 2023-01-27 RX ADMIN — METHOCARBAMOL 1000 MG: 500 TABLET ORAL at 08:01

## 2023-01-27 RX ADMIN — POLYETHYLENE GLYCOL 3350 17 G: 17 POWDER, FOR SOLUTION ORAL at 10:01

## 2023-01-27 RX ADMIN — BUPROPION HYDROCHLORIDE 300 MG: 300 TABLET, FILM COATED, EXTENDED RELEASE ORAL at 08:01

## 2023-01-27 RX ADMIN — MEROPENEM 2 G: 1 INJECTION INTRAVENOUS at 04:01

## 2023-01-27 RX ADMIN — POTASSIUM & SODIUM PHOSPHATES POWDER PACK 280-160-250 MG 2 PACKET: 280-160-250 PACK at 10:01

## 2023-01-27 RX ADMIN — POTASSIUM & SODIUM PHOSPHATES POWDER PACK 280-160-250 MG 2 PACKET: 280-160-250 PACK at 02:01

## 2023-01-27 RX ADMIN — MEROPENEM 2 G: 1 INJECTION INTRAVENOUS at 12:01

## 2023-01-27 RX ADMIN — Medication 800 MG: at 06:01

## 2023-01-27 RX ADMIN — OXYCODONE HYDROCHLORIDE 5 MG: 5 TABLET ORAL at 08:01

## 2023-01-27 RX ADMIN — Medication: at 02:01

## 2023-01-27 RX ADMIN — SENNOSIDES AND DOCUSATE SODIUM 2 TABLET: 50; 8.6 TABLET ORAL at 10:01

## 2023-01-27 RX ADMIN — OXYCODONE HYDROCHLORIDE 5 MG: 5 TABLET ORAL at 01:01

## 2023-01-27 RX ADMIN — METHOCARBAMOL 1000 MG: 500 TABLET ORAL at 09:01

## 2023-01-27 RX ADMIN — SENNOSIDES AND DOCUSATE SODIUM 2 TABLET: 50; 8.6 TABLET ORAL at 09:01

## 2023-01-27 RX ADMIN — ACETAMINOPHEN 1000 MG: 500 TABLET ORAL at 04:01

## 2023-01-27 RX ADMIN — Medication: at 03:01

## 2023-01-27 RX ADMIN — METHOCARBAMOL 1000 MG: 500 TABLET ORAL at 01:01

## 2023-01-27 RX ADMIN — Medication: at 09:01

## 2023-01-27 RX ADMIN — GABAPENTIN 600 MG: 300 CAPSULE ORAL at 08:01

## 2023-01-27 RX ADMIN — DIAZEPAM 5 MG: 5 TABLET ORAL at 12:01

## 2023-01-27 RX ADMIN — LACTULOSE 20 G: 20 SOLUTION ORAL at 04:01

## 2023-01-27 RX ADMIN — ACETAMINOPHEN 1000 MG: 500 TABLET ORAL at 09:01

## 2023-01-27 RX ADMIN — ACETAMINOPHEN 1000 MG: 500 TABLET ORAL at 08:01

## 2023-01-27 NOTE — ASSESSMENT & PLAN NOTE
Thoracic discitis s/p laminectomy on 1/24. Operative cultures with E coli and ESBL E coli. Pending further surgical intervention on 2/1.   · Continue meropenem.   · Will follow up operative cultures.  · Will likely transition to ertapenem after her stage 2 intervention.   · Patient is NOT a candidate for home IV antibiotics due to lack of transportation, inability to follow up in clinic, and recent relapse with illicit drugs. I recommend patient be placed in SNF or LTAC once ready for discharge in order to complete her antibiotic therapy. I have discussed this with the patient at bedside today.

## 2023-01-27 NOTE — SUBJECTIVE & OBJECTIVE
Interval History:  See hospital course above.    Review of Systems   Constitutional:  Negative for fever.   HENT:  Negative for congestion and rhinorrhea.    Eyes:  Negative for visual disturbance.   Respiratory:  Negative for shortness of breath.    Cardiovascular:  Negative for chest pain.   Gastrointestinal:  Negative for abdominal distention, abdominal pain, nausea and vomiting.   Genitourinary:  Negative for dysuria and hematuria.   Musculoskeletal:  Positive for back pain. Negative for neck pain.   Neurological:  Positive for weakness. Negative for dizziness, seizures, light-headedness, numbness and headaches.     Objective:     Vitals:  Temp: 98.6 °F (37 °C)  Pulse: (!) 113  Rhythm: sinus tachycardia  BP: (!) 110/58  MAP (mmHg): 73  Resp: 20  SpO2: 100 %  Oxygen Concentration (%): 24    Temp  Min: 97.9 °F (36.6 °C)  Max: 98.6 °F (37 °C)  Pulse  Min: 110  Max: 118  BP  Min: 102/54  Max: 132/63  MAP (mmHg)  Min: 73  Max: 89  Resp  Min: 10  Max: 34  SpO2  Min: 95 %  Max: 100 %  Oxygen Concentration (%)  Min: 24  Max: 24    01/26 0701 - 01/27 0700  In: 2450.1 [P.O.:750; I.V.:419]  Out: 2460 [Urine:1620; Drains:840]   Unmeasured Output  Urine Occurrence: 1  Stool Occurrence: 0       Physical Exam  Vitals and nursing note reviewed.   Constitutional:       General: She is not in acute distress.     Appearance: She is obese. She is not ill-appearing, toxic-appearing or diaphoretic.   HENT:      Head: Normocephalic.      Mouth/Throat:      Mouth: Mucous membranes are moist.   Eyes:      General: No scleral icterus.        Right eye: No discharge.         Left eye: No discharge.   Cardiovascular:      Rate and Rhythm: Normal rate and regular rhythm.   Pulmonary:      Effort: Pulmonary effort is normal. No respiratory distress.      Comments: NC in place  Abdominal:      General: Abdomen is flat.      Palpations: Abdomen is soft.      Tenderness: There is no abdominal tenderness.   Musculoskeletal:         General: No  deformity.      Cervical back: No rigidity.   Skin:     General: Skin is warm and dry.      Coloration: Skin is not jaundiced.   Neurological:      Mental Status: She is alert and oriented to person, place, and time. Mental status is at baseline.      Motor: Weakness (Bilateral LE) and tremor (Bilateral UE) present.      Comments:   E4 V5 M6  Awake, alert, and oriented to self, time, place, and situation.   Answering all questions appropriately and following all commands briskly.   No facial droop. EOMI. PERRL. Peripheral vision intact bilaterally.   FOWLER spontaneously and follows commands  RUE: 3/5  LUE: 3/5  RLE: 2/5  LLE: 2/5  Sensation intact     Psychiatric:         Mood and Affect: Mood normal.         Behavior: Behavior normal.     Gait & coordination exams deferred.      Medications:  Continuoushydromorphone in 0.9 % NaCl 6 mg/30 ml    Scheduledacetaminophen, 1,000 mg, TID  buPROPion, 300 mg, Daily  gabapentin, 600 mg, TID  lactulose, 20 g, TID  meropenem (MERREM) IVPB, 2 g, Q8H  methocarbamoL, 1,000 mg, QID  pantoprazole, 40 mg, Daily  polyethylene glycol, 17 g, BID  senna-docusate 8.6-50 mg, 2 tablet, BID  sodium chloride 0.9%, 10 mL, Q6H    PRNsodium chloride, , Q24H PRN  sodium chloride, , Q24H PRN  dextrose 10%, 12.5 g, PRN  dextrose 10%, 25 g, PRN  diazePAM, 5 mg, Q6H PRN  glucagon (human recombinant), 1 mg, PRN  glucose, 16 g, PRN  glucose, 24 g, PRN  hydrALAZINE, 10 mg, Q6H PRN  labetalol, 10 mg, Q4H PRN  magnesium hydroxide 400 mg/5 ml, 30 mL, Daily PRN  magnesium oxide, 800 mg, PRN  magnesium oxide, 800 mg, PRN  melatonin, 6 mg, Nightly PRN  naloxone, 0.02 mg, PRN  ondansetron, 4 mg, Q6H PRN  oxyCODONE, 5 mg, Q4H PRN  polyethylene glycol, 17 g, Daily PRN  potassium bicarbonate, 35 mEq, PRN  potassium bicarbonate, 50 mEq, PRN  potassium bicarbonate, 60 mEq, PRN  potassium, sodium phosphates, 2 packet, PRN  potassium, sodium phosphates, 2 packet, PRN  potassium, sodium phosphates, 2 packet,  PRN  prochlorperazine, 2.5 mg, Q6H PRN  sodium chloride 0.9%, 10 mL, Q12H PRN  sodium chloride 0.9%, 10 mL, PRN      Today I personally reviewed pertinent medications, lines/drains/airways, laboratory results, microbiology results, notably:      Laboratory:  CBC:  Recent Labs   Lab 01/26/23  2342   WBC 7.05   RBC 2.52*   HGB 7.2*   HCT 22.5*   *   MCV 89   MCH 28.6   MCHC 32.0   ]    CMP:  Recent Labs   Lab 01/26/23  2342   CALCIUM 7.9*   PROT 5.4*   *   K 4.6   CO2 23      BUN 19   CREATININE 0.5   ALKPHOS 93   ALT 29   AST 48*   BILITOT 1.9*       Microbiology:  Microbiology Results (last 7 days)       Procedure Component Value Units Date/Time    Aerobic culture [[id number]]  (Abnormal) Collected: 01/24/23 0943    Order Status: Completed Specimen: Bone from Back Updated: 01/27/23 1153     Aerobic Bacterial Culture GRAM NEGATIVE VINCENT  From broth only  Identification and susceptibility pending      Narrative:      4) Perispinal    Blood culture [[id number]] Collected: 01/27/23 0120    Order Status: Completed Specimen: Blood from Peripheral, Forearm, Right Updated: 01/27/23 0915     Blood Culture, Routine No Growth to date    Blood culture [[id number]] Collected: 01/27/23 0123    Order Status: Completed Specimen: Blood from Peripheral, Antecubital, Left Updated: 01/27/23 0915     Blood Culture, Routine No Growth to date    Aerobic culture [[id number]]  (Abnormal)  (Susceptibility) Collected: 01/24/23 0914    Order Status: Completed Specimen: Wound from Back Updated: 01/26/23 1306     Aerobic Bacterial Culture ESCHERICHIA COLI  Rare      Narrative:      1) Perispinal    Aerobic culture [[id number]]  (Abnormal)  (Susceptibility) Collected: 01/24/23 0914    Order Status: Completed Specimen: Wound from Back Updated: 01/26/23 1306     Aerobic Bacterial Culture ESCHERICHIA COLI ESBL  Few      Narrative:      2) Perispinal    Aerobic culture [[id number]]  (Abnormal)  (Susceptibility) Collected: 01/24/23 0943     Order Status: Completed Specimen: Bone from Back Updated: 01/26/23 1252     Aerobic Bacterial Culture ESCHERICHIA COLI ESBL  Few      Narrative:      3) Perispinal    Culture, Anaerobe [[id number]] Collected: 01/24/23 0943    Order Status: Completed Specimen: Bone from Back Updated: 01/26/23 1238     Anaerobic Culture Culture in progress    Narrative:      4) Perispinal    Culture, Anaerobe [[id number]] Collected: 01/24/23 0943    Order Status: Completed Specimen: Bone from Back Updated: 01/26/23 1237     Anaerobic Culture Culture in progress    Narrative:      3) Perispinal    Culture, Anaerobe [[id number]] Collected: 01/24/23 0913    Order Status: Completed Specimen: Body Fluid from Back Updated: 01/26/23 1237     Anaerobic Culture Culture in progress    Narrative:      1) Perispinal    AFB Culture & Smear [[id number]] Collected: 01/24/23 0943    Order Status: Completed Specimen: Bone from Back Updated: 01/25/23 2127     AFB Culture & Smear Culture in progress     AFB CULTURE STAIN No acid fast bacilli seen.    Narrative:      4) Perispinal    AFB Culture & Smear [[id number]] Collected: 01/24/23 0943    Order Status: Completed Specimen: Bone from Back Updated: 01/25/23 2127     AFB Culture & Smear Culture in progress     AFB CULTURE STAIN No acid fast bacilli seen.    Narrative:      3) Perispinal    AFB Culture & Smear [[id number]] Collected: 01/24/23 0913    Order Status: Completed Specimen: Body Fluid from Back Updated: 01/25/23 2127     AFB Culture & Smear Culture in progress     AFB CULTURE STAIN No acid fast bacilli seen.    Narrative:      1) Perispinal    AFB Culture & Smear [[id number]] Collected: 01/24/23 0913    Order Status: Completed Specimen: Body Fluid from Back Updated: 01/25/23 2127     AFB Culture & Smear Culture in progress     AFB CULTURE STAIN No acid fast bacilli seen.    Narrative:      2) Perispinal    Blood culture [[id number]] Collected: 01/20/23 1131    Order Status: Completed Specimen: Blood  Updated: 01/25/23 1412     Blood Culture, Routine No growth after 5 days.    Narrative:      Collection has been rescheduled by [facility] at 01/20/2023 11:03 Reason:   Patient unavailable in room with doctors   Collection has been rescheduled by [facility] at 01/20/2023 11:03 Reason:   Patient unavailable in room with doctors     Blood culture [[id number]] Collected: 01/20/23 1131    Order Status: Completed Specimen: Blood Updated: 01/25/23 1412     Blood Culture, Routine No growth after 5 days.    Narrative:      Collection has been rescheduled by [facility] at 01/20/2023 11:03 Reason:   Patient unavailable in room with doctors   Collection has been rescheduled by [facility] at 01/20/2023 11:03 Reason:   Patient unavailable in room with doctors     Culture, Anaerobe [[id number]] Collected: 01/24/23 0913    Order Status: Completed Specimen: Body Fluid from Back Updated: 01/25/23 0658     Anaerobic Culture Culture in progress    Narrative:      2) Perispinal    Gram stain [[id number]] Collected: 01/24/23 0913    Order Status: Completed Specimen: Body Fluid from Back Updated: 01/24/23 1239     Gram Stain Result No WBC's      No organisms seen    Narrative:      1) Perispinal    Gram stain [[id number]] Collected: 01/24/23 0944    Order Status: Completed Specimen: Bone from Back Updated: 01/24/23 1238     Gram Stain Result Rare WBC's      No organisms seen    Narrative:      3) Perispinal    Gram stain [[id number]] Collected: 01/24/23 0913    Order Status: Completed Specimen: Body Fluid from Back Updated: 01/24/23 1237     Gram Stain Result Rare WBC's      No organisms seen    Narrative:      2) Perispinal    Gram stain [[id number]] Collected: 01/24/23 0943    Order Status: Completed Specimen: Bone from Back Updated: 01/24/23 1236     Gram Stain Result No WBC's      No organisms seen    Narrative:      4) Perispinal    Fungus culture [[id number]] Collected: 01/24/23 0943    Order Status: Sent Specimen: Bone from Back Updated: 01/24/23 1009    Fungus  culture [[id number]] Collected: 01/24/23 0943    Order Status: Sent Specimen: Bone from Back Updated: 01/24/23 1007    Fungus culture [[id number]] Collected: 01/24/23 0913    Order Status: Sent Specimen: Body Fluid from Back Updated: 01/24/23 0929    Fungus culture [[id number]] Collected: 01/24/23 0913    Order Status: Sent Specimen: Body Fluid from Back Updated: 01/24/23 0927    Urine culture [[id number]] Collected: 01/20/23 0130    Order Status: Completed Specimen: Urine Updated: 01/21/23 1144     Urine Culture, Routine Multiple organisms isolated. None in predominance.  Repeat if      clinically necessary.    Narrative:      Specimen Source->Urine             Diet  Diet Adult Regular (IDDSI Level 7)  Diet Adult Regular (IDDSI Level 7)

## 2023-01-27 NOTE — PROGRESS NOTES
[facility]  Infectious Disease  Progress Note    Patient Name: Rachel Zazueta  MRN: [id number]  Admission Date: 1/19/2023  Length of Stay: 8 days  Attending Physician: Eber Montoya DO  Primary Care Provider: Dannielle Merino DO    Isolation Status: Contact  Assessment/Plan:      Thoracic discitis  Thoracic discitis s/p laminectomy on 1/24. Operative cultures with E coli and ESBL E coli. Pending further surgical intervention on 2/1.   · Continue meropenem.   · Will follow up operative cultures.  · Will likely transition to ertapenem after her stage 2 intervention.   · Patient is NOT a candidate for home IV antibiotics due to lack of transportation, inability to follow up in clinic, and recent relapse with illicit drugs. I recommend patient be placed in SNF or LTAC once ready for discharge in order to complete her antibiotic therapy. I have discussed this with the patient at bedside today.       Anticipated Disposition: per primary    Thank you for your consult. I will follow-up with patient. Please contact us if you have any additional questions.    So Avitia MD  Infectious Disease  [facility]    Subjective:     Principal Problem:Weakness of both lower extremities    HPI: Ms. Zazueta is a 42-year-old woman known to ID with hx of IVDU, IV drug use (methamphetamine), chronic HCV with cirrhosis, chronic pancytopenia, spinal fusion (04/2021), epidural abscess (GBS) with removal of hardware (02/2022), T10-pelvis spinal fusion with hardware (03/2022), obesity,  and neurogenic bladder who was admitted to [facility] on 12/23/22 with back pain and increasing weakness in the lower extremities.       Urine and blood cx from that admission + for ESBL E coli which was treated with ertapenem.  Repeat blood cx on 12/28 and 1/10 negative.   Patient started on physical therapy but weakness in the lower extremities increased, and the patient is no longer able to walk.  "She is able to urinate intermittently and defecate.      An MRI cervical spine 1/10 was significant  for multilevel spondylosis.  MRI brain with nonspecific findings.  Xray of spine notable for focal kyphosis at T9-10 increased when compared to prior scoliosis radiographs.  An MRI of the spine was not available due to the patient's size. and she was transferred to [facility] for advanced imaging and Neurosurgery evaluation.      MRI  imaging here  on 1/20 concerning for worsening kyphosis, extensive edema in the paraspinal muscle. CT with spondylodiscitis T8-9, T9-10    Neurosurgery planning hardware removal and washout on 1/24    Of note, review of records show admission to [facility] 11/14/2022 to 11/22/2022 with pyogenic arthritis of the left shoulder, s/p I&D on 11/15 with cx positive for E Coli.  Utox was positive for methadone, opiates, amphetamines, and THC.  Seen by ID at [facility] who recommend IV ceftriaxone X 6 weeks at LTAC, but patient was not agreeable to this.  Offered  daily ceftriaxone infusions at infusion center.  At discharge from there she had received 8 days of ceftriaxone. Review of notes show she was subsequently prescribed 4 weeks of Bactrim 2 DS tabs twice daily.       Interval History: "So so". S/P thoracic laminectomy with fusion and washout on 1/24. Operative cultures with E coli and ESBL E coli. Pending stage 2 of her operative management.     Review of Systems   Constitutional:  Negative for chills, fatigue, fever and unexpected weight change.   HENT:  Negative for ear pain, facial swelling, hearing loss, mouth sores, nosebleeds, rhinorrhea, sinus pressure, sore throat, tinnitus, trouble swallowing and voice change.    Eyes:  Negative for photophobia, pain, redness and visual disturbance.   Respiratory:  Negative for cough, chest tightness, shortness of breath and wheezing.    Cardiovascular:  Negative for chest pain, palpitations and leg swelling.   Gastrointestinal:  " Negative for abdominal pain, blood in stool, constipation, diarrhea, nausea and vomiting.   Endocrine: Negative for cold intolerance, heat intolerance, polydipsia, polyphagia and polyuria.   Genitourinary:  Negative for decreased urine volume, dysuria, flank pain, frequency, hematuria, menstrual problem, urgency, vaginal bleeding, vaginal discharge and vaginal pain.   Musculoskeletal:  Positive for back pain and myalgias. Negative for arthralgias, joint swelling and neck pain.   Skin:  Negative for rash.   Allergic/Immunologic: Negative for environmental allergies, food allergies and immunocompromised state.   Neurological:  Negative for dizziness, seizures, syncope, weakness, light-headedness, numbness and headaches.   Hematological:  Negative for adenopathy. Does not bruise/bleed easily.   Psychiatric/Behavioral:  Negative for confusion, hallucinations, self-injury, sleep disturbance and suicidal ideas. The patient is not nervous/anxious.    Objective:     Vital Signs (Most Recent):  Temp: 97.9 °F (36.6 °C) (01/27/23 1101)  Pulse: (!) 111 (01/27/23 1301)  Resp: 15 (01/27/23 1336)  BP: (!) 102/54 (01/27/23 1301)  SpO2: 100 % (01/27/23 1301)   Vital Signs (24h Range):  Temp:  [97.9 °F (36.6 °C)-98.3 °F (36.8 °C)] 97.9 °F (36.6 °C)  Pulse:  [110-118] 111  Resp:  [10-34] 15  SpO2:  [95 %-100 %] 100 %  BP: (102-130)/(54-71) 102/54     Weight: 104 kg (229 lb 4.5 oz)  Body mass index is 35.91 kg/m².    Estimated Creatinine Clearance: 181.9 mL/min (based on SCr of 0.5 mg/dL).    Physical Exam  Vitals and nursing note reviewed.   Constitutional:       Appearance: She is well-developed.   HENT:      Head: Normocephalic and atraumatic.      Right Ear: External ear normal.      Left Ear: External ear normal.   Eyes:      General: No scleral icterus.        Right eye: No discharge.         Left eye: No discharge.      Conjunctiva/sclera: Conjunctivae normal.   Cardiovascular:      Rate and Rhythm: Normal rate and regular  rhythm.      Heart sounds: Normal heart sounds. No murmur heard.    No friction rub. No gallop.   Pulmonary:      Effort: Pulmonary effort is normal. No respiratory distress.      Breath sounds: Normal breath sounds. No stridor. No wheezing or rales.   Abdominal:      General: Bowel sounds are normal.   Musculoskeletal:         General: No tenderness. Normal range of motion.   Skin:     General: Skin is warm and dry.   Neurological:      Mental Status: She is alert and oriented to person, place, and time.       Significant Labs: Blood Culture:   Recent Labs   Lab 01/10/23  2344 01/10/23  2348 01/20/23  1131 01/27/23  0120 01/27/23  0123   LABBLOO No growth after 5 days. No growth after 5 days. No growth after 5 days.  No growth after 5 days. No Growth to date No Growth to date       BMP:   Recent Labs   Lab 01/26/23  2342 01/27/23  1215   *  --    *  --    K 4.6  --      --    CO2 23  --    BUN 19  --    CREATININE 0.5  --    CALCIUM 7.9*  --    MG 1.5* 1.6       CBC:   Recent Labs   Lab 01/26/23  1312 01/26/23  2342 01/27/23  1215   WBC 7.73 7.05 6.24   HGB 7.5* 7.2* 6.8*   HCT 23.7* 22.5* 21.7*   * 121* 114*       Wound Culture:   Recent Labs   Lab 11/15/22  1301 01/24/23  0914 01/24/23  0943   LABAERO ESCHERICHIA COLI  Few  * ESCHERICHIA COLI ESBL  Few  *  ESCHERICHIA COLI  Rare  * GRAM NEGATIVE VINCENT  From broth only  Identification and susceptibility pending  *  ESCHERICHIA COLI ESBL  Few  *         Significant Imaging: I have reviewed all pertinent imaging results/findings within the past 24 hours.

## 2023-01-27 NOTE — SUBJECTIVE & OBJECTIVE
"Interval History: "So so". S/P thoracic laminectomy with fusion and washout on 1/24. Operative cultures with E coli and ESBL E coli. Pending stage 2 of her operative management.     Review of Systems   Constitutional:  Negative for chills, fatigue, fever and unexpected weight change.   HENT:  Negative for ear pain, facial swelling, hearing loss, mouth sores, nosebleeds, rhinorrhea, sinus pressure, sore throat, tinnitus, trouble swallowing and voice change.    Eyes:  Negative for photophobia, pain, redness and visual disturbance.   Respiratory:  Negative for cough, chest tightness, shortness of breath and wheezing.    Cardiovascular:  Negative for chest pain, palpitations and leg swelling.   Gastrointestinal:  Negative for abdominal pain, blood in stool, constipation, diarrhea, nausea and vomiting.   Endocrine: Negative for cold intolerance, heat intolerance, polydipsia, polyphagia and polyuria.   Genitourinary:  Negative for decreased urine volume, dysuria, flank pain, frequency, hematuria, menstrual problem, urgency, vaginal bleeding, vaginal discharge and vaginal pain.   Musculoskeletal:  Positive for back pain and myalgias. Negative for arthralgias, joint swelling and neck pain.   Skin:  Negative for rash.   Allergic/Immunologic: Negative for environmental allergies, food allergies and immunocompromised state.   Neurological:  Negative for dizziness, seizures, syncope, weakness, light-headedness, numbness and headaches.   Hematological:  Negative for adenopathy. Does not bruise/bleed easily.   Psychiatric/Behavioral:  Negative for confusion, hallucinations, self-injury, sleep disturbance and suicidal ideas. The patient is not nervous/anxious.    Objective:     Vital Signs (Most Recent):  Temp: 97.9 °F (36.6 °C) (01/27/23 1101)  Pulse: (!) 111 (01/27/23 1301)  Resp: 15 (01/27/23 1336)  BP: (!) 102/54 (01/27/23 1301)  SpO2: 100 % (01/27/23 1301)   Vital Signs (24h Range):  Temp:  [97.9 °F (36.6 °C)-98.3 °F (36.8 " °C)] 97.9 °F (36.6 °C)  Pulse:  [110-118] 111  Resp:  [10-34] 15  SpO2:  [95 %-100 %] 100 %  BP: (102-130)/(54-71) 102/54     Weight: 104 kg (229 lb 4.5 oz)  Body mass index is 35.91 kg/m².    Estimated Creatinine Clearance: 181.9 mL/min (based on SCr of 0.5 mg/dL).    Physical Exam  Vitals and nursing note reviewed.   Constitutional:       Appearance: She is well-developed.   HENT:      Head: Normocephalic and atraumatic.      Right Ear: External ear normal.      Left Ear: External ear normal.   Eyes:      General: No scleral icterus.        Right eye: No discharge.         Left eye: No discharge.      Conjunctiva/sclera: Conjunctivae normal.   Cardiovascular:      Rate and Rhythm: Normal rate and regular rhythm.      Heart sounds: Normal heart sounds. No murmur heard.    No friction rub. No gallop.   Pulmonary:      Effort: Pulmonary effort is normal. No respiratory distress.      Breath sounds: Normal breath sounds. No stridor. No wheezing or rales.   Abdominal:      General: Bowel sounds are normal.   Musculoskeletal:         General: No tenderness. Normal range of motion.   Skin:     General: Skin is warm and dry.   Neurological:      Mental Status: She is alert and oriented to person, place, and time.       Significant Labs: Blood Culture:   Recent Labs   Lab 01/10/23  2344 01/10/23  2348 01/20/23  1131 01/27/23  0120 01/27/23  0123   LABBLOO No growth after 5 days. No growth after 5 days. No growth after 5 days.  No growth after 5 days. No Growth to date No Growth to date       BMP:   Recent Labs   Lab 01/26/23  2342 01/27/23  1215   *  --    *  --    K 4.6  --      --    CO2 23  --    BUN 19  --    CREATININE 0.5  --    CALCIUM 7.9*  --    MG 1.5* 1.6       CBC:   Recent Labs   Lab 01/26/23  1312 01/26/23  2342 01/27/23  1215   WBC 7.73 7.05 6.24   HGB 7.5* 7.2* 6.8*   HCT 23.7* 22.5* 21.7*   * 121* 114*       Wound Culture:   Recent Labs   Lab 11/15/22  1301 01/24/23  0914  01/24/23  0943   LABAERO ESCHERICHIA COLI  Few  * ESCHERICHIA COLI ESBL  Few  *  ESCHERICHIA COLI  Rare  * GRAM NEGATIVE VINCENT  From broth only  Identification and susceptibility pending  *  ESCHERICHIA COLI ESBL  Few  *         Significant Imaging: I have reviewed all pertinent imaging results/findings within the past 24 hours.

## 2023-01-27 NOTE — ANESTHESIA POSTPROCEDURE EVALUATION
Anesthesia Post Evaluation    Patient: Rachel Zazueta    Procedure(s) Performed: Procedure(s) (LRB):  **AIRO** T8-T12 POSTERIOR FUSION, T8-T10 LAMINECTOMY, HARDWARE REMOVAL AND WASHOUT (N/A)    Final Anesthesia Type: general      Patient location during evaluation: PACU  Patient participation: Yes- Able to Participate  Level of consciousness: awake and alert and oriented  Post-procedure vital signs: reviewed and stable  Pain management: adequate  Airway patency: patent    PONV status at discharge: No PONV  Anesthetic complications: no      Cardiovascular status: hemodynamically stable  Respiratory status: unassisted, spontaneous ventilation and room air  Hydration status: euvolemic  Follow-up not needed.          Vitals Value Taken Time   /61 01/27/23 0901   Temp 36.6 °C (97.9 °F) 01/27/23 0701   Pulse 113 01/27/23 0947   Resp 30 01/27/23 0947   SpO2 100 % 01/27/23 0947   Vitals shown include unvalidated device data.      Event Time   Out of Recovery 01/24/2023 14:00:00         Pain/Chen Score: Pain Rating Prior to Med Admin: 8 (1/27/2023  9:36 AM)  Pain Rating Post Med Admin: 0 (1/26/2023  4:25 PM)

## 2023-01-27 NOTE — SUBJECTIVE & OBJECTIVE
Interval History: 1/27: NAEON, exam stable. Stage 2 OR date changed to Thursday 2/2    Medications:  Continuous Infusions:   hydromorphone in 0.9 % NaCl 6 mg/30 ml       Scheduled Meds:   acetaminophen  1,000 mg Oral TID    buPROPion  300 mg Oral Daily    gabapentin  600 mg Oral TID    lactulose  20 g Oral TID    meropenem (MERREM) IVPB  2 g Intravenous Q8H    methocarbamoL  1,000 mg Oral QID    pantoprazole  40 mg Oral Daily    polyethylene glycol  17 g Oral BID    senna-docusate 8.6-50 mg  2 tablet Oral BID    sodium chloride 0.9%  10 mL Intravenous Q6H     PRN Meds:sodium chloride, sodium chloride, dextrose 10%, dextrose 10%, diazePAM, glucagon (human recombinant), glucose, glucose, hydrALAZINE, labetalol, magnesium hydroxide 400 mg/5 ml, magnesium oxide, magnesium oxide, melatonin, naloxone, ondansetron, oxyCODONE, polyethylene glycol, potassium bicarbonate, potassium bicarbonate, potassium bicarbonate, potassium, sodium phosphates, potassium, sodium phosphates, potassium, sodium phosphates, prochlorperazine, sodium chloride 0.9%, Flushing PICC Protocol **AND** sodium chloride 0.9% **AND** sodium chloride 0.9%     Review of Systems  Objective:     Weight: 104 kg (229 lb 4.5 oz)  Body mass index is 35.91 kg/m².  Vital Signs (Most Recent):  Temp: 98.6 °F (37 °C) (01/27/23 1652)  Pulse: (!) 113 (01/27/23 1652)  Resp: 20 (01/27/23 1739)  BP: (!) 110/58 (01/27/23 1652)  SpO2: 100 % (01/27/23 1652) Vital Signs (24h Range):  Temp:  [97.9 °F (36.6 °C)-98.6 °F (37 °C)] 98.6 °F (37 °C)  Pulse:  [110-118] 113  Resp:  [10-34] 20  SpO2:  [95 %-100 %] 100 %  BP: (102-132)/(54-71) 110/58     Date 01/27/23 0700 - 01/28/23 0659   Shift 0798-1335 1627-8930 1941-4216 24 Hour Total   INTAKE   P.O. 450   450   I.V.(mL/kg) 40(0.4)   40(0.4)   Shift Total(mL/kg) 490(4.7)   490(4.7)   OUTPUT   Urine(mL/kg/hr) 270(0.3)   270   Drains 210   210   Other 0   0   Shift Total(mL/kg) 480(4.6)   480(4.6)   Weight (kg) 104 104 104 104                 Oxygen Concentration (%):  [24] 24           Neurosurgery Physical Exam  General: well developed, well nourished, no distress.   Head: normocephalic, atraumatic  Neurologic: Alert and oriented. Thought content appropriate.  GCS: Motor: 6/Verbal: 5/Eyes: 4 GCS Total: 15  Mental Status: Awake, Alert, Oriented x 4  Language: No aphasia  Speech: No dysarthria  Cranial nerves: face symmetric, tongue midline, CN II-XII grossly intact.   Eyes: pupils equal, round, reactive to light with accommodation, EOMI, nystagmus.   Pulmonary: normal respirations, no signs of respiratory distress  Abdomen: soft, non-distended, not tender to palpation  Skin: Skin is warm, dry and intact.  Sensory: intact to light touch throughout     Motor Strength:Moves all extremities spontaneously with good tone.  Full strength upper and extremities. No abnormal movements seen.      Strength   Deltoids Triceps Biceps Wrist Extension Wrist Flexion Hand    Upper: R 5/5 5/5 5/5 5/5 5/5 5/5     L 5/5 5/5 5/5 5/5 5/5 5/5       Iliopsoas Quadriceps Knee  Flexion Tibialis  anterior Gastro- cnemius EHL   Lower: R 3/5 3/5 3/5 4-/5 4-/5 4-/5     L 3/5 3/5 3/5 4-/5 4-/5 4-/5      Reflexes:   DTR: 2+ symmetrically throughout.  Parker's: Negative.  Babinski's: Present bilaterally  Clonus: 2 beats bilateral lower extremity     Incision well healed       Significant Labs:  Recent Labs   Lab 01/26/23  0208 01/26/23  2342 01/27/23  1215   * 114*  --    * 131*  --    K 4.6 4.6  --     105  --    CO2 22* 23  --    BUN 25* 19  --    CREATININE 0.6 0.5  --    CALCIUM 7.7* 7.9*  --    MG 1.6 1.5* 1.6       Recent Labs   Lab 01/26/23  1312 01/26/23  2342 01/27/23  1215   WBC 7.73 7.05 6.24   HGB 7.5* 7.2* 6.8*   HCT 23.7* 22.5* 21.7*   * 121* 114*       No results for input(s): LABPT, INR, APTT in the last 48 hours.    Microbiology Results (last 7 days)       Procedure Component Value Units Date/Time    Aerobic culture [[id number]]   (Abnormal) Collected: 01/24/23 0943    Order Status: Completed Specimen: Bone from Back Updated: 01/27/23 1153     Aerobic Bacterial Culture GRAM NEGATIVE VINCENT  From broth only  Identification and susceptibility pending      Narrative:      4) Perispinal    Blood culture [[id number]] Collected: 01/27/23 0120    Order Status: Completed Specimen: Blood from Peripheral, Forearm, Right Updated: 01/27/23 0915     Blood Culture, Routine No Growth to date    Blood culture [[id number]] Collected: 01/27/23 0123    Order Status: Completed Specimen: Blood from Peripheral, Antecubital, Left Updated: 01/27/23 0915     Blood Culture, Routine No Growth to date    Aerobic culture [[id number]]  (Abnormal)  (Susceptibility) Collected: 01/24/23 0914    Order Status: Completed Specimen: Wound from Back Updated: 01/26/23 1306     Aerobic Bacterial Culture ESCHERICHIA COLI  Rare      Narrative:      1) Perispinal    Aerobic culture [[id number]]  (Abnormal)  (Susceptibility) Collected: 01/24/23 0914    Order Status: Completed Specimen: Wound from Back Updated: 01/26/23 1306     Aerobic Bacterial Culture ESCHERICHIA COLI ESBL  Few      Narrative:      2) Perispinal    Aerobic culture [[id number]]  (Abnormal)  (Susceptibility) Collected: 01/24/23 0943    Order Status: Completed Specimen: Bone from Back Updated: 01/26/23 1252     Aerobic Bacterial Culture ESCHERICHIA COLI ESBL  Few      Narrative:      3) Perispinal    Culture, Anaerobe [[id number]] Collected: 01/24/23 0943    Order Status: Completed Specimen: Bone from Back Updated: 01/26/23 1238     Anaerobic Culture Culture in progress    Narrative:      4) Perispinal    Culture, Anaerobe [[id number]] Collected: 01/24/23 0943    Order Status: Completed Specimen: Bone from Back Updated: 01/26/23 1237     Anaerobic Culture Culture in progress    Narrative:      3) Perispinal    Culture, Anaerobe [[id number]] Collected: 01/24/23 0913    Order Status: Completed Specimen: Body Fluid from Back Updated:  01/26/23 1237     Anaerobic Culture Culture in progress    Narrative:      1) Perispinal    AFB Culture & Smear [[id number]] Collected: 01/24/23 0943    Order Status: Completed Specimen: Bone from Back Updated: 01/25/23 2127     AFB Culture & Smear Culture in progress     AFB CULTURE STAIN No acid fast bacilli seen.    Narrative:      4) Perispinal    AFB Culture & Smear [[id number]] Collected: 01/24/23 0943    Order Status: Completed Specimen: Bone from Back Updated: 01/25/23 2127     AFB Culture & Smear Culture in progress     AFB CULTURE STAIN No acid fast bacilli seen.    Narrative:      3) Perispinal    AFB Culture & Smear [[id number]] Collected: 01/24/23 0913    Order Status: Completed Specimen: Body Fluid from Back Updated: 01/25/23 2127     AFB Culture & Smear Culture in progress     AFB CULTURE STAIN No acid fast bacilli seen.    Narrative:      1) Perispinal    AFB Culture & Smear [[id number]] Collected: 01/24/23 0913    Order Status: Completed Specimen: Body Fluid from Back Updated: 01/25/23 2127     AFB Culture & Smear Culture in progress     AFB CULTURE STAIN No acid fast bacilli seen.    Narrative:      2) Perispinal    Blood culture [[id number]] Collected: 01/20/23 1131    Order Status: Completed Specimen: Blood Updated: 01/25/23 1412     Blood Culture, Routine No growth after 5 days.    Narrative:      Collection has been rescheduled by [facility] at 01/20/2023 11:03 Reason:   Patient unavailable in room with doctors   Collection has been rescheduled by 2 at 01/20/2023 11:03 Reason:   Patient unavailable in room with doctors     Blood culture [[id number]] Collected: 01/20/23 1131    Order Status: Completed Specimen: Blood Updated: 01/25/23 1412     Blood Culture, Routine No growth after 5 days.    Narrative:      Collection has been rescheduled by 2 at 01/20/2023 11:03 Reason:   Patient unavailable in room with doctors   Collection has been rescheduled by [facility] at 01/20/2023 11:03 Reason:   Patient unavailable  in room with doctors     Culture, Anaerobe [[id number]] Collected: 01/24/23 0913    Order Status: Completed Specimen: Body Fluid from Back Updated: 01/25/23 0658     Anaerobic Culture Culture in progress    Narrative:      2) Perispinal    Gram stain [[id number]] Collected: 01/24/23 0913    Order Status: Completed Specimen: Body Fluid from Back Updated: 01/24/23 1239     Gram Stain Result No WBC's      No organisms seen    Narrative:      1) Perispinal    Gram stain [[id number]] Collected: 01/24/23 0944    Order Status: Completed Specimen: Bone from Back Updated: 01/24/23 1238     Gram Stain Result Rare WBC's      No organisms seen    Narrative:      3) Perispinal    Gram stain [[id number]] Collected: 01/24/23 0913    Order Status: Completed Specimen: Body Fluid from Back Updated: 01/24/23 1237     Gram Stain Result Rare WBC's      No organisms seen    Narrative:      2) Perispinal    Gram stain [[id number]] Collected: 01/24/23 0943    Order Status: Completed Specimen: Bone from Back Updated: 01/24/23 1236     Gram Stain Result No WBC's      No organisms seen    Narrative:      4) Perispinal    Fungus culture [[id number]] Collected: 01/24/23 0943    Order Status: Sent Specimen: Bone from Back Updated: 01/24/23 1009    Fungus culture [[id number]] Collected: 01/24/23 0943    Order Status: Sent Specimen: Bone from Back Updated: 01/24/23 1007    Fungus culture [[id number]] Collected: 01/24/23 0913    Order Status: Sent Specimen: Body Fluid from Back Updated: 01/24/23 0929    Fungus culture [[id number]] Collected: 01/24/23 0913    Order Status: Sent Specimen: Body Fluid from Back Updated: 01/24/23 0927    Urine culture [[id number]] Collected: 01/20/23 0130    Order Status: Completed Specimen: Urine Updated: 01/21/23 1144     Urine Culture, Routine Multiple organisms isolated. None in predominance.  Repeat if      clinically necessary.    Narrative:      Specimen Source->Urine          All pertinent labs from the last 24 hours have  been reviewed.    Significant Diagnostics:  CT C-spine:  Vertebrae: The dens, lateral masses, and occipital condyles are intact.  There is no evidence of fracture or dislocation.     Discs: Multilevel disc height loss and degenerative endplate change.  Prominent C6 inferior endplate Schmorl's node, similar in appearance dating back to MRI from 02/14/2022.     Degenerative changes: Sent spinal canal dimensions at C3-C4 are 10.5 mm, 8.5 mm at C4-C5, 10.0 mm at C5-C6, and 10.8 mm at C6-C7 .  Multilevel uncovertebral spurring with multilevel neural foraminal narrowing most pronounced at C5-C6 with moderate right neural foraminal narrowing and C6-C7 with moderate left neural foraminal narrowing.    Physical Exam  Neurosurgery Physical Exam

## 2023-01-27 NOTE — PT/OT/SLP PROGRESS
Clinic Care Coordination Contact  [facility]/Voicemail    Referral Source: CTS    Clinical Data: Care Coordinator Outreach, d/c from  [facility] [place], [place] 1/20/18 for dehydration from chemotherapy.    Outreach attempted x 1.  Left message on voicemail with call back information and requested return call.    Plan: Care Coordinator will mail out care coordination introduction letter with care coordinator contact information and explanation of care coordination services. Care Coordinator will try to reach patient again in 1-2 business days.      Chiqui Marcum RN, [facility]  RN Care Coordinator  [facility], [place], [facility]  Phone # [phone]  1/22/2018 8:58 AM         Occupational Therapy      Patient Name:  Rachel Zazueta   MRN:  [id number]    Patient not seen today secondary to  (pt with HOB flat orders currently & with OR plans for next week.  Will discontinue therapy orders & await new orders post surgery as indicated 2/1 in neurosurgery notes.).     1/27/2023

## 2023-01-28 LAB
ALBUMIN SERPL BCP-MCNC: 1.9 G/DL (ref 3.5–5.2)
ALP SERPL-CCNC: 96 U/L (ref 55–135)
ALT SERPL W/O P-5'-P-CCNC: 29 U/L (ref 10–44)
ANION GAP SERPL CALC-SCNC: 5 MMOL/L (ref 8–16)
AST SERPL-CCNC: 54 U/L (ref 10–40)
BACTERIA SPEC AEROBE CULT: ABNORMAL
BASOPHILS # BLD AUTO: 0.02 K/UL (ref 0–0.2)
BASOPHILS # BLD AUTO: 0.03 K/UL (ref 0–0.2)
BASOPHILS NFR BLD: 0.4 % (ref 0–1.9)
BASOPHILS NFR BLD: 0.6 % (ref 0–1.9)
BILIRUB SERPL-MCNC: 2.3 MG/DL (ref 0.1–1)
BUN SERPL-MCNC: 16 MG/DL (ref 6–20)
CALCIUM SERPL-MCNC: 8 MG/DL (ref 8.7–10.5)
CHLORIDE SERPL-SCNC: 102 MMOL/L (ref 95–110)
CO2 SERPL-SCNC: 25 MMOL/L (ref 23–29)
CREAT SERPL-MCNC: 0.5 MG/DL (ref 0.5–1.4)
DIFFERENTIAL METHOD: ABNORMAL
DIFFERENTIAL METHOD: ABNORMAL
EOSINOPHIL # BLD AUTO: 0.2 K/UL (ref 0–0.5)
EOSINOPHIL # BLD AUTO: 0.2 K/UL (ref 0–0.5)
EOSINOPHIL NFR BLD: 4 % (ref 0–8)
EOSINOPHIL NFR BLD: 4.4 % (ref 0–8)
ERYTHROCYTE [DISTWIDTH] IN BLOOD BY AUTOMATED COUNT: 18.3 % (ref 11.5–14.5)
ERYTHROCYTE [DISTWIDTH] IN BLOOD BY AUTOMATED COUNT: 19.1 % (ref 11.5–14.5)
EST. GFR  (NO RACE VARIABLE): >60 ML/MIN/1.73 M^2
GLUCOSE SERPL-MCNC: 98 MG/DL (ref 70–110)
HCT VFR BLD AUTO: 23.3 % (ref 37–48.5)
HCT VFR BLD AUTO: 23.6 % (ref 37–48.5)
HGB BLD-MCNC: 7.4 G/DL (ref 12–16)
HGB BLD-MCNC: 7.4 G/DL (ref 12–16)
IMM GRANULOCYTES # BLD AUTO: 0.08 K/UL (ref 0–0.04)
IMM GRANULOCYTES # BLD AUTO: 0.1 K/UL (ref 0–0.04)
IMM GRANULOCYTES NFR BLD AUTO: 1.5 % (ref 0–0.5)
IMM GRANULOCYTES NFR BLD AUTO: 2 % (ref 0–0.5)
LYMPHOCYTES # BLD AUTO: 0.8 K/UL (ref 1–4.8)
LYMPHOCYTES # BLD AUTO: 0.9 K/UL (ref 1–4.8)
LYMPHOCYTES NFR BLD: 15.9 % (ref 18–48)
LYMPHOCYTES NFR BLD: 17.4 % (ref 18–48)
MAGNESIUM SERPL-MCNC: 1.6 MG/DL (ref 1.6–2.6)
MCH RBC QN AUTO: 28.9 PG (ref 27–31)
MCH RBC QN AUTO: 29 PG (ref 27–31)
MCHC RBC AUTO-ENTMCNC: 31.4 G/DL (ref 32–36)
MCHC RBC AUTO-ENTMCNC: 31.8 G/DL (ref 32–36)
MCV RBC AUTO: 91 FL (ref 82–98)
MCV RBC AUTO: 93 FL (ref 82–98)
MONOCYTES # BLD AUTO: 0.6 K/UL (ref 0.3–1)
MONOCYTES # BLD AUTO: 0.7 K/UL (ref 0.3–1)
MONOCYTES NFR BLD: 10.7 % (ref 4–15)
MONOCYTES NFR BLD: 13.9 % (ref 4–15)
NEUTROPHILS # BLD AUTO: 3.2 K/UL (ref 1.8–7.7)
NEUTROPHILS # BLD AUTO: 3.4 K/UL (ref 1.8–7.7)
NEUTROPHILS NFR BLD: 63.6 % (ref 38–73)
NEUTROPHILS NFR BLD: 65.6 % (ref 38–73)
NRBC BLD-RTO: 1 /100 WBC
NRBC BLD-RTO: 1 /100 WBC
PHOSPHATE SERPL-MCNC: 2.4 MG/DL (ref 2.7–4.5)
PLATELET # BLD AUTO: 93 K/UL (ref 150–450)
PLATELET # BLD AUTO: 99 K/UL (ref 150–450)
PMV BLD AUTO: 9.3 FL (ref 9.2–12.9)
PMV BLD AUTO: 9.9 FL (ref 9.2–12.9)
POCT GLUCOSE: 111 MG/DL (ref 70–110)
POTASSIUM SERPL-SCNC: 4.1 MMOL/L (ref 3.5–5.1)
PROT SERPL-MCNC: 5.1 G/DL (ref 6–8.4)
RBC # BLD AUTO: 2.55 M/UL (ref 4–5.4)
RBC # BLD AUTO: 2.56 M/UL (ref 4–5.4)
SODIUM SERPL-SCNC: 132 MMOL/L (ref 136–145)
WBC # BLD AUTO: 5.03 K/UL (ref 3.9–12.7)
WBC # BLD AUTO: 5.22 K/UL (ref 3.9–12.7)

## 2023-01-28 PROCEDURE — A4216 STERILE WATER/SALINE, 10 ML: HCPCS | Performed by: PSYCHIATRY & NEUROLOGY

## 2023-01-28 PROCEDURE — 25000003 PHARM REV CODE 250

## 2023-01-28 PROCEDURE — 27000221 HC OXYGEN, UP TO 24 HOURS

## 2023-01-28 PROCEDURE — 20000000 HC ICU ROOM

## 2023-01-28 PROCEDURE — 63600175 PHARM REV CODE 636 W HCPCS

## 2023-01-28 PROCEDURE — 99233 SBSQ HOSP IP/OBS HIGH 50: CPT | Mod: ,,, | Performed by: PSYCHIATRY & NEUROLOGY

## 2023-01-28 PROCEDURE — 94761 N-INVAS EAR/PLS OXIMETRY MLT: CPT

## 2023-01-28 PROCEDURE — 99233 SBSQ HOSP IP/OBS HIGH 50: CPT | Mod: ,,, | Performed by: INTERNAL MEDICINE

## 2023-01-28 PROCEDURE — 25000003 PHARM REV CODE 250: Performed by: INTERNAL MEDICINE

## 2023-01-28 PROCEDURE — 63600175 PHARM REV CODE 636 W HCPCS: Performed by: STUDENT IN AN ORGANIZED HEALTH CARE EDUCATION/TRAINING PROGRAM

## 2023-01-28 PROCEDURE — 25000003 PHARM REV CODE 250: Performed by: PSYCHIATRY & NEUROLOGY

## 2023-01-28 PROCEDURE — 85025 COMPLETE CBC W/AUTO DIFF WBC: CPT

## 2023-01-28 PROCEDURE — 80053 COMPREHEN METABOLIC PANEL: CPT | Performed by: STUDENT IN AN ORGANIZED HEALTH CARE EDUCATION/TRAINING PROGRAM

## 2023-01-28 PROCEDURE — 83735 ASSAY OF MAGNESIUM: CPT | Performed by: STUDENT IN AN ORGANIZED HEALTH CARE EDUCATION/TRAINING PROGRAM

## 2023-01-28 PROCEDURE — 99233 PR SUBSEQUENT HOSPITAL CARE,LEVL III: ICD-10-PCS | Mod: ,,, | Performed by: INTERNAL MEDICINE

## 2023-01-28 PROCEDURE — 84100 ASSAY OF PHOSPHORUS: CPT | Performed by: STUDENT IN AN ORGANIZED HEALTH CARE EDUCATION/TRAINING PROGRAM

## 2023-01-28 PROCEDURE — 27000207 HC ISOLATION

## 2023-01-28 PROCEDURE — 99233 PR SUBSEQUENT HOSPITAL CARE,LEVL III: ICD-10-PCS | Mod: ,,, | Performed by: PSYCHIATRY & NEUROLOGY

## 2023-01-28 PROCEDURE — 25000003 PHARM REV CODE 250: Performed by: STUDENT IN AN ORGANIZED HEALTH CARE EDUCATION/TRAINING PROGRAM

## 2023-01-28 RX ADMIN — MEROPENEM 2 G: 1 INJECTION INTRAVENOUS at 12:01

## 2023-01-28 RX ADMIN — Medication: at 09:01

## 2023-01-28 RX ADMIN — ONDANSETRON 4 MG: 2 INJECTION INTRAMUSCULAR; INTRAVENOUS at 01:01

## 2023-01-28 RX ADMIN — GABAPENTIN 600 MG: 300 CAPSULE ORAL at 08:01

## 2023-01-28 RX ADMIN — MEROPENEM 2 G: 1 INJECTION INTRAVENOUS at 04:01

## 2023-01-28 RX ADMIN — ACETAMINOPHEN 1000 MG: 500 TABLET ORAL at 08:01

## 2023-01-28 RX ADMIN — ONDANSETRON 4 MG: 2 INJECTION INTRAMUSCULAR; INTRAVENOUS at 05:01

## 2023-01-28 RX ADMIN — PANTOPRAZOLE SODIUM 40 MG: 40 TABLET, DELAYED RELEASE ORAL at 08:01

## 2023-01-28 RX ADMIN — OXYCODONE HYDROCHLORIDE 5 MG: 5 TABLET ORAL at 01:01

## 2023-01-28 RX ADMIN — METHOCARBAMOL 1000 MG: 500 TABLET ORAL at 04:01

## 2023-01-28 RX ADMIN — DIAZEPAM 5 MG: 5 TABLET ORAL at 08:01

## 2023-01-28 RX ADMIN — DIAZEPAM 5 MG: 5 TABLET ORAL at 01:01

## 2023-01-28 RX ADMIN — Medication: at 08:01

## 2023-01-28 RX ADMIN — METHOCARBAMOL 1000 MG: 500 TABLET ORAL at 08:01

## 2023-01-28 RX ADMIN — Medication 10 ML: at 05:01

## 2023-01-28 RX ADMIN — GABAPENTIN 600 MG: 300 CAPSULE ORAL at 02:01

## 2023-01-28 RX ADMIN — DIAZEPAM 5 MG: 5 TABLET ORAL at 03:01

## 2023-01-28 RX ADMIN — POTASSIUM & SODIUM PHOSPHATES POWDER PACK 280-160-250 MG 2 PACKET: 280-160-250 PACK at 05:01

## 2023-01-28 RX ADMIN — Medication 800 MG: at 05:01

## 2023-01-28 RX ADMIN — LACTULOSE 20 G: 20 SOLUTION ORAL at 02:01

## 2023-01-28 RX ADMIN — Medication 10 ML: at 12:01

## 2023-01-28 RX ADMIN — MEROPENEM 2 G: 1 INJECTION INTRAVENOUS at 08:01

## 2023-01-28 RX ADMIN — Medication: at 03:01

## 2023-01-28 RX ADMIN — ACETAMINOPHEN 1000 MG: 500 TABLET ORAL at 09:01

## 2023-01-28 RX ADMIN — Medication: at 04:01

## 2023-01-28 RX ADMIN — POTASSIUM & SODIUM PHOSPHATES POWDER PACK 280-160-250 MG 2 PACKET: 280-160-250 PACK at 11:01

## 2023-01-28 RX ADMIN — ACETAMINOPHEN 1000 MG: 500 TABLET ORAL at 02:01

## 2023-01-28 RX ADMIN — OXYCODONE HYDROCHLORIDE 5 MG: 5 TABLET ORAL at 08:01

## 2023-01-28 RX ADMIN — BUPROPION HYDROCHLORIDE 300 MG: 300 TABLET, FILM COATED, EXTENDED RELEASE ORAL at 08:01

## 2023-01-28 RX ADMIN — SENNOSIDES AND DOCUSATE SODIUM 2 TABLET: 50; 8.6 TABLET ORAL at 08:01

## 2023-01-28 RX ADMIN — METHOCARBAMOL 1000 MG: 500 TABLET ORAL at 01:01

## 2023-01-28 RX ADMIN — Medication 800 MG: at 11:01

## 2023-01-28 RX ADMIN — POLYETHYLENE GLYCOL 3350 17 G: 17 POWDER, FOR SOLUTION ORAL at 08:01

## 2023-01-28 RX ADMIN — LACTULOSE 20 G: 20 SOLUTION ORAL at 08:01

## 2023-01-28 RX ADMIN — Medication 10 ML: at 06:01

## 2023-01-28 NOTE — PROGRESS NOTES
[facility] Care  Neurosurgery  Progress Note    Subjective:     History of Present Illness: Ms. Zazueta is a 42 y.o F w/ hx ofIV drug use (methamphetamine), chronic HCV with cirrhosis, spinal fusion (04/2021), epidural abscess with removal of hardware (02/2022), spinal fusion with hardware (T10 - Pelvis / 03/2022), obesity (BMI 39.3) and neurogenic bladder and recent shoulder surgery who initially presented to [facility] for evaluation of back pain and left leg weakness.  She reports initially noting the left leg weakness when she was about to go to the bathroom and was about to fall but was assisted by her boyfriend.  She was brought to the ED via EMS.  Urinalysis with UTI concerns and blood cultures at OSH grew ESBL E coli, and shoulder effusion concern for infection so she was treated with meropenem.  During hospitalization, she reports the weakness that started out in her left leg initially then spread upwards to her left thigh, left hip, then crossed over to the right hip and spread to her right leg.  Denies recent IV drug use. She reports her last incidence of drug use was more than 3 years ago and also denies tobacco, and alcohol abuse.  Reports cannabis use.     OSH course notable for concerning urinalysis and blood cultures positive for ESBL E coli treated with meropenem.  She was also admit to the ICU where she was treated with Precedex for significant body aches, irritability, and muscle spasms.  Concerns of a murmur on physical exam so she underwent TTE which did not show any vegetations.  Worsening lower extremity weakness workup with MRI head nonspecific, MRI cervical spine with multilevel spondylosis, X-ray spine with multilevel thoracolumbar fusion and diskectomy, focal kyphosis at T9-10 increased compared to prior.  She was started on prophylaxis amoxicillin due to her history of infected hardware.  MRI spine unable to be completed due to patient's body habitus so she was  transferred to [facility] [place] imaging and Neurosurgery, Neurology evaluation.      Post-Op Info:  Procedure(s) (LRB):  **AIRO** T8-T12 POSTERIOR FUSION, T8-T10 LAMINECTOMY, HARDWARE REMOVAL AND WASHOUT (N/A)   4 Days Post-Op     Interval History: 1/28: neuro stable. OR Thursday with nsgy for T9-T10 corpectomy and plastics closure. HV in place, Hb low 7s.    Medications:  Continuous Infusions:   hydromorphone in 0.9 % NaCl 6 mg/30 ml       Scheduled Meds:   acetaminophen  1,000 mg Oral TID    buPROPion  300 mg Oral Daily    gabapentin  600 mg Oral TID    lactulose  20 g Oral TID    meropenem (MERREM) IVPB  2 g Intravenous Q8H    methocarbamoL  1,000 mg Oral QID    pantoprazole  40 mg Oral Daily    polyethylene glycol  17 g Oral BID    senna-docusate 8.6-50 mg  2 tablet Oral BID    sodium chloride 0.9%  10 mL Intravenous Q6H     PRN Meds:sodium chloride, sodium chloride, dextrose 10%, dextrose 10%, diazePAM, glucagon (human recombinant), glucose, glucose, hydrALAZINE, labetalol, magnesium hydroxide 400 mg/5 ml, magnesium oxide, magnesium oxide, melatonin, naloxone, ondansetron, oxyCODONE, polyethylene glycol, potassium bicarbonate, potassium bicarbonate, potassium bicarbonate, potassium, sodium phosphates, potassium, sodium phosphates, potassium, sodium phosphates, prochlorperazine, sodium chloride 0.9%, Flushing PICC Protocol **AND** sodium chloride 0.9% **AND** sodium chloride 0.9%     Review of Systems  Objective:     Weight: 104 kg (229 lb 4.5 oz)  Body mass index is 35.91 kg/m².  Vital Signs (Most Recent):  Temp: 98.5 °F (36.9 °C) (01/28/23 0701)  Pulse: 109 (01/28/23 1001)  Resp: 19 (01/28/23 1001)  BP: 107/65 (01/28/23 1001)  SpO2: 100 % (01/28/23 1001) Vital Signs (24h Range):  Temp:  [97.9 °F (36.6 °C)-98.6 °F (37 °C)] 98.5 °F (36.9 °C)  Pulse:  [106-117] 109  Resp:  [10-30] 19  SpO2:  [98 %-100 %] 100 %  BP: (100-152)/(54-82) 107/65     Date 01/28/23 0700 - 01/29/23 0659   Shift 9062-5593  6268-9787 9524-1450 24 Hour Total   INTAKE   P.O. 300   300   IV Piggyback 198.9   198.9   Shift Total(mL/kg) 498.9(4.8)   498.9(4.8)   OUTPUT   Urine(mL/kg/hr) 350   350   Drains 0   0   Other 0   0   Shift Total(mL/kg) 350(3.4)   350(3.4)   Weight (kg) 104 104 104 104                Oxygen Concentration (%):  [24] 24           Neurosurgery Physical Exam  General: well developed, well nourished, no distress.   Head: normocephalic, atraumatic  Neurologic: Alert and oriented. Thought content appropriate.  GCS: Motor: 6/Verbal: 5/Eyes: 4 GCS Total: 15  Mental Status: Awake, Alert, Oriented x 4  Language: No aphasia  Speech: No dysarthria  Cranial nerves: face symmetric, tongue midline, CN II-XII grossly intact.   Eyes: pupils equal, round, reactive to light with accommodation, EOMI, nystagmus.   Pulmonary: normal respirations, no signs of respiratory distress  Abdomen: soft, non-distended, not tender to palpation  Skin: Skin is warm, dry and intact.  Sensory: intact to light touch throughout     Motor Strength:Moves all extremities spontaneously with good tone.  Full strength upper and extremities. No abnormal movements seen.      Strength   Deltoids Triceps Biceps Wrist Extension Wrist Flexion Hand    Upper: R 5/5 5/5 5/5 5/5 5/5 5/5     L 5/5 5/5 5/5 5/5 5/5 5/5       Iliopsoas Quadriceps Knee  Flexion Tibialis  anterior Gastro- cnemius EHL   Lower: R 3/5 4+/5 4+/5 4/5 4-/5 4/5     L 3/5 4+/5 4+/5 4/5 4-/5 4/5      Reflexes:   DTR: 2+ symmetrically throughout.  Parker's: Negative.  Babinski's: Present bilaterally  Clonus: 2 beats bilateral lower extremity     Incision well healed       Significant Labs:  Recent Labs   Lab 01/26/23  2342 01/27/23  1215 01/28/23  0425   *  --  98   *  --  132*   K 4.6  --  4.1     --  102   CO2 23  --  25   BUN 19  --  16   CREATININE 0.5  --  0.5   CALCIUM 7.9*  --  8.0*   MG 1.5* 1.6 1.6       Recent Labs   Lab 01/27/23  1215 01/27/23  2121 01/28/23  08    WBC 6.24 7.11 5.22   HGB 6.8* 7.2* 7.4*   HCT 21.7* 22.7* 23.3*   * 102* 93*       No results for input(s): LABPT, INR, APTT in the last 48 hours.    Microbiology Results (last 7 days)       Procedure Component Value Units Date/Time    Blood culture [[id number]] Collected: 01/27/23 0120    Order Status: Completed Specimen: Blood from Peripheral, Forearm, Right Updated: 01/28/23 0613     Blood Culture, Routine No Growth to date      No Growth to date    Blood culture [[id number]] Collected: 01/27/23 0123    Order Status: Completed Specimen: Blood from Peripheral, Antecubital, Left Updated: 01/28/23 0613     Blood Culture, Routine No Growth to date      No Growth to date    Aerobic culture [[id number]]  (Abnormal) Collected: 01/24/23 0943    Order Status: Completed Specimen: Bone from Back Updated: 01/27/23 1153     Aerobic Bacterial Culture GRAM NEGATIVE VINCENT  From broth only  Identification and susceptibility pending      Narrative:      4) Perispinal    Aerobic culture [[id number]]  (Abnormal)  (Susceptibility) Collected: 01/24/23 0914    Order Status: Completed Specimen: Wound from Back Updated: 01/26/23 1306     Aerobic Bacterial Culture ESCHERICHIA COLI  Rare      Narrative:      1) Perispinal    Aerobic culture [[id number]]  (Abnormal)  (Susceptibility) Collected: 01/24/23 0914    Order Status: Completed Specimen: Wound from Back Updated: 01/26/23 1306     Aerobic Bacterial Culture ESCHERICHIA COLI ESBL  Few      Narrative:      2) Perispinal    Aerobic culture [[id number]]  (Abnormal)  (Susceptibility) Collected: 01/24/23 0943    Order Status: Completed Specimen: Bone from Back Updated: 01/26/23 1252     Aerobic Bacterial Culture ESCHERICHIA COLI ESBL  Few      Narrative:      3) Perispinal    Culture, Anaerobe [[id number]] Collected: 01/24/23 0943    Order Status: Completed Specimen: Bone from Back Updated: 01/26/23 1238     Anaerobic Culture Culture in progress    Narrative:      4) Perispinal    Culture,  Anaerobe [[id number]] Collected: 01/24/23 0943    Order Status: Completed Specimen: Bone from Back Updated: 01/26/23 1237     Anaerobic Culture Culture in progress    Narrative:      3) Perispinal    Culture, Anaerobe [[id number]] Collected: 01/24/23 0913    Order Status: Completed Specimen: Body Fluid from Back Updated: 01/26/23 1237     Anaerobic Culture Culture in progress    Narrative:      1) Perispinal    AFB Culture & Smear [[id number]] Collected: 01/24/23 0943    Order Status: Completed Specimen: Bone from Back Updated: 01/25/23 2127     AFB Culture & Smear Culture in progress     AFB CULTURE STAIN No acid fast bacilli seen.    Narrative:      4) Perispinal    AFB Culture & Smear [[id number]] Collected: 01/24/23 0943    Order Status: Completed Specimen: Bone from Back Updated: 01/25/23 2127     AFB Culture & Smear Culture in progress     AFB CULTURE STAIN No acid fast bacilli seen.    Narrative:      3) Perispinal    AFB Culture & Smear [[id number]] Collected: 01/24/23 0913    Order Status: Completed Specimen: Body Fluid from Back Updated: 01/25/23 2127     AFB Culture & Smear Culture in progress     AFB CULTURE STAIN No acid fast bacilli seen.    Narrative:      1) Perispinal    AFB Culture & Smear [[id number]] Collected: 01/24/23 0913    Order Status: Completed Specimen: Body Fluid from Back Updated: 01/25/23 2127     AFB Culture & Smear Culture in progress     AFB CULTURE STAIN No acid fast bacilli seen.    Narrative:      2) Perispinal    Blood culture [[id number]] Collected: 01/20/23 1131    Order Status: Completed Specimen: Blood Updated: 01/25/23 1412     Blood Culture, Routine No growth after 5 days.    Narrative:      Collection has been rescheduled by 2 at 01/20/2023 11:03 Reason:   Patient unavailable in room with doctors   Collection has been rescheduled by 2 at 01/20/2023 11:03 Reason:   Patient unavailable in room with doctors     Blood culture [[id number]] Collected: 01/20/23 1131    Order Status:  Completed Specimen: Blood Updated: 01/25/23 1412     Blood Culture, Routine No growth after 5 days.    Narrative:      Collection has been rescheduled by [facility] at 01/20/2023 11:03 Reason:   Patient unavailable in room with doctors   Collection has been rescheduled by [facility] at 01/20/2023 11:03 Reason:   Patient unavailable in room with doctors     Culture, Anaerobe [[id number]] Collected: 01/24/23 0913    Order Status: Completed Specimen: Body Fluid from Back Updated: 01/25/23 0658     Anaerobic Culture Culture in progress    Narrative:      2) Perispinal    Gram stain [[id number]] Collected: 01/24/23 0913    Order Status: Completed Specimen: Body Fluid from Back Updated: 01/24/23 1239     Gram Stain Result No WBC's      No organisms seen    Narrative:      1) Perispinal    Gram stain [[id number]] Collected: 01/24/23 0944    Order Status: Completed Specimen: Bone from Back Updated: 01/24/23 1238     Gram Stain Result Rare WBC's      No organisms seen    Narrative:      3) Perispinal    Gram stain [[id number]] Collected: 01/24/23 0913    Order Status: Completed Specimen: Body Fluid from Back Updated: 01/24/23 1237     Gram Stain Result Rare WBC's      No organisms seen    Narrative:      2) Perispinal    Gram stain [[id number]] Collected: 01/24/23 0943    Order Status: Completed Specimen: Bone from Back Updated: 01/24/23 1236     Gram Stain Result No WBC's      No organisms seen    Narrative:      4) Perispinal    Fungus culture [[id number]] Collected: 01/24/23 0943    Order Status: Sent Specimen: Bone from Back Updated: 01/24/23 1009    Fungus culture [[id number]] Collected: 01/24/23 0943    Order Status: Sent Specimen: Bone from Back Updated: 01/24/23 1007    Fungus culture [[id number]] Collected: 01/24/23 0913    Order Status: Sent Specimen: Body Fluid from Back Updated: 01/24/23 0929    Fungus culture [[id number]] Collected: 01/24/23 0913    Order Status: Sent Specimen: Body Fluid from Back Updated: 01/24/23 0927    Urine  culture [[id number]] Collected: 01/20/23 0130    Order Status: Completed Specimen: Urine Updated: 01/21/23 1144     Urine Culture, Routine Multiple organisms isolated. None in predominance.  Repeat if      clinically necessary.    Narrative:      Specimen Source->Urine          All pertinent labs from the last 24 hours have been reviewed.    Significant Diagnostics:  CT C-spine:  Vertebrae: The dens, lateral masses, and occipital condyles are intact.  There is no evidence of fracture or dislocation.     Discs: Multilevel disc height loss and degenerative endplate change.  Prominent C6 inferior endplate Schmorl's node, similar in appearance dating back to MRI from 02/14/2022.     Degenerative changes: Sent spinal canal dimensions at C3-C4 are 10.5 mm, 8.5 mm at C4-C5, 10.0 mm at C5-C6, and 10.8 mm at C6-C7 .  Multilevel uncovertebral spurring with multilevel neural foraminal narrowing most pronounced at C5-C6 with moderate right neural foraminal narrowing and C6-C7 with moderate left neural foraminal narrowing.    Physical Exam  Neurosurgery Physical Exam    Assessment/Plan:     * Weakness of both lower extremities  Pt is 42F multiple spinal surgeries and revisions last T10- Pelvis in March 2022 who presented to OSH with concern for shoulder infection and UTI found to have E coli sepsis who has had progressive worsening BLE weakness, XR showed possible worsening proximal junctional kyphotic deformity. Transferred to [facility] [place] to  and neurosurgery was consulted    OR yesterday for temporary T6-12 PSIF. Taz pus noted intraoperatively.   Given intraoperative findings, new operative plan for T4-pelvis revision and extension of fusion with T9-10 corpectomy with plastic surgery assistance planned for 2/2/23    Plan:  Admitted to ICU, Keep in ICU on logroll precautions until stage 2 Thursday.    -- MRI C/T/L spine 1/20 with C7 SP enhancement, focal kyphosis at T8-10 with severe anterior height loss at T9, enhancing C6 inferior  endplate Schmorl's node  -- CT T/L spine thin cut 1/20 with haloing of pelvic and T10 screws, spondylodiscitis T8-9, T9-10  -- CT C spine with concern for discitis at C6-7  -- flex ext XR done, limited view of C6-7 in swimmers view 2/2 shoulders  -- 1/20 ESR 70, CRP 9.4. elevated from prior   -- TLSO at bedside  --HV x2 to gravity, WV in place, monitor ouput  --ID: Vanc/merropenam   -- BCx 1/20: NGTD   -- OR cultures 1/24: E. coli  --OR 2/2/23 as above   -- will obtain informed consent   -- NPO 2/1/23 midnight  -- multimodal pain control per primary medicine team  NSGY will continue to follow. Please contact team with any further questions.        Emily Hercules MD  Neurosurgery  [facility] - [facility]

## 2023-01-28 NOTE — ASSESSMENT & PLAN NOTE
Patient has reportedly refused transplant evaluation in the past.   -Daily CMP and trend LFTs  -Continue Lactulose 20g TID   -Will need GI/hepatology follow up outpatient    MELD-Na score: 18 at 1/26/2023 11:42 PM  MELD score: 13 at 1/26/2023 11:42 PM  Calculated from:  Serum Creatinine: 0.5 mg/dL (Using min of 1 mg/dL) at 1/26/2023 11:42 PM  Serum Sodium: 131 mmol/L at 1/26/2023 11:42 PM  Total Bilirubin: 1.9 mg/dL at 1/26/2023 11:42 PM  INR(ratio): 1.4 at 1/25/2023  1:08 AM  Age: 42 years

## 2023-01-28 NOTE — PROGRESS NOTES
[facility] - Neuro Critical Care  Neurocritical Care  Progress Note    Admit Date: 1/19/2023  Service Date: 01/27/2023  Length of Stay: 8    Subjective:     Chief Complaint: Weakness of both lower extremities    History of Present Illness: 42-year-old female with a history of IV drug use, chronic hep C with cirrhosis, spinal fusion in April 2021, complicated by epidural abscess with removal of hardware February 2022 and against spinal fusion in March 2022 of T10 through the pelvis as well as neurogenic bladder who initially presented to [facility] in late December for generalized weakness, fatigue and bilateral flank pain.  Her course at [facility] was complicated by a E coli bacteremia thought to be due to a urinary source, she is completed a 7 day course of meropenem on 1/3, and had bcx from 1/2 that did not grow any bacteria.       She also had an ICU admission for ongoing back spasms and agitation requiring Precedex.  In early January patient started developing progressive lower extremity weakness greater in the left compared to the right.  She also had an MRI at the outside hospital that showed some white matter changes concerning for possible demyelinating disease.  However her thoracic spine radiographs also showed worsening of her kyphosis at T9-T10.  Due to the concern for either a demyelinating polyneuropathy, MS, or spinal cord compression the patient was transferred to [facility] for neurosurgical evaluation.       On admission the patient had a CT lumbar and thoracic spine and an MRI CTL spine. The MRI showed  focal kyphosis T8 through T10, with possible associated cord edema signal.  As well as extensive edema throughout the posterior paraspinal musculature concerning for possible infectious or inflammatory process.  There was concern that the findings at T8 through T10 could correspond with compressive myelopathy.  The CT lumbar spine and thoracic spine showed spondylodiscitis at T8  through T10, as well as pathologic compression fractures at T9 and T10.  There were also erosive changes involving the sacroiliac joints bilaterally which was concerning for sacroiliitis and could be infectious.     Patient underwent laminectomy T8-T10; posterior fusion T8-T12; and washout by NSGY today. No complications noted during surgery. Patient is HDS and on 3L NC. Patient is currently on a PCA dilaudid pump.            Hospital Course: 1/25/2023 NAEO, pain well controlled on current regimen. Continue ABX. Maintain logroll precautions and HOB <30 until second stage of surgery.  01/26/2023 Hemoglobin 6.6, repeat 6.2. Increased drainage noted from bilateral hemovac. S/p 1u pRBC. PICC team consulted for long term access for antibiotics, remove IJ central line when PICC placed.   01/27/2023: NAEON. HV to gravity with decreased drainge. Hgb 7.2 this AM, will trend CBC q12h. Lytes replaced. Bowel regimen adjusted.       Interval History:  See hospital course above.    Review of Systems   Constitutional:  Negative for fever.   HENT:  Negative for congestion and rhinorrhea.    Eyes:  Negative for visual disturbance.   Respiratory:  Negative for shortness of breath.    Cardiovascular:  Negative for chest pain.   Gastrointestinal:  Negative for abdominal distention, abdominal pain, nausea and vomiting.   Genitourinary:  Negative for dysuria and hematuria.   Musculoskeletal:  Positive for back pain. Negative for neck pain.   Neurological:  Positive for weakness. Negative for dizziness, seizures, light-headedness, numbness and headaches.     Objective:     Vitals:  Temp: 98.6 °F (37 °C)  Pulse: (!) 113  Rhythm: sinus tachycardia  BP: (!) 110/58  MAP (mmHg): 73  Resp: 20  SpO2: 100 %  Oxygen Concentration (%): 24    Temp  Min: 97.9 °F (36.6 °C)  Max: 98.6 °F (37 °C)  Pulse  Min: 110  Max: 118  BP  Min: 102/54  Max: 132/63  MAP (mmHg)  Min: 73  Max: 89  Resp  Min: 10  Max: 34  SpO2  Min: 95 %  Max: 100 %  Oxygen  Concentration (%)  Min: 24  Max: 24    01/26 0701 - 01/27 0700  In: 2450.1 [P.O.:750; I.V.:419]  Out: 2460 [Urine:1620; Drains:840]   Unmeasured Output  Urine Occurrence: 1  Stool Occurrence: 0       Physical Exam  Vitals and nursing note reviewed.   Constitutional:       General: She is not in acute distress.     Appearance: She is obese. She is not ill-appearing, toxic-appearing or diaphoretic.   HENT:      Head: Normocephalic.      Mouth/Throat:      Mouth: Mucous membranes are moist.   Eyes:      General: No scleral icterus.        Right eye: No discharge.         Left eye: No discharge.   Cardiovascular:      Rate and Rhythm: Normal rate and regular rhythm.   Pulmonary:      Effort: Pulmonary effort is normal. No respiratory distress.      Comments: NC in place  Abdominal:      General: Abdomen is flat.      Palpations: Abdomen is soft.      Tenderness: There is no abdominal tenderness.   Musculoskeletal:         General: No deformity.      Cervical back: No rigidity.   Skin:     General: Skin is warm and dry.      Coloration: Skin is not jaundiced.   Neurological:      Mental Status: She is alert and oriented to person, place, and time. Mental status is at baseline.      Motor: Weakness (Bilateral LE) and tremor (Bilateral UE) present.      Comments:   E4 V5 M6  Awake, alert, and oriented to self, time, place, and situation.   Answering all questions appropriately and following all commands briskly.   No facial droop. EOMI. PERRL. Peripheral vision intact bilaterally.   FOWLER spontaneously and follows commands  RUE: 3/5  LUE: 3/5  RLE: 2/5  LLE: 2/5  Sensation intact     Psychiatric:         Mood and Affect: Mood normal.         Behavior: Behavior normal.     Gait & coordination exams deferred.      Medications:  Continuoushydromorphone in 0.9 % NaCl 6 mg/30 ml    Scheduledacetaminophen, 1,000 mg, TID  buPROPion, 300 mg, Daily  gabapentin, 600 mg, TID  lactulose, 20 g, TID  meropenem (MERREM) IVPB, 2 g,  Q8H  methocarbamoL, 1,000 mg, QID  pantoprazole, 40 mg, Daily  polyethylene glycol, 17 g, BID  senna-docusate 8.6-50 mg, 2 tablet, BID  sodium chloride 0.9%, 10 mL, Q6H    PRNsodium chloride, , Q24H PRN  sodium chloride, , Q24H PRN  dextrose 10%, 12.5 g, PRN  dextrose 10%, 25 g, PRN  diazePAM, 5 mg, Q6H PRN  glucagon (human recombinant), 1 mg, PRN  glucose, 16 g, PRN  glucose, 24 g, PRN  hydrALAZINE, 10 mg, Q6H PRN  labetalol, 10 mg, Q4H PRN  magnesium hydroxide 400 mg/5 ml, 30 mL, Daily PRN  magnesium oxide, 800 mg, PRN  magnesium oxide, 800 mg, PRN  melatonin, 6 mg, Nightly PRN  naloxone, 0.02 mg, PRN  ondansetron, 4 mg, Q6H PRN  oxyCODONE, 5 mg, Q4H PRN  polyethylene glycol, 17 g, Daily PRN  potassium bicarbonate, 35 mEq, PRN  potassium bicarbonate, 50 mEq, PRN  potassium bicarbonate, 60 mEq, PRN  potassium, sodium phosphates, 2 packet, PRN  potassium, sodium phosphates, 2 packet, PRN  potassium, sodium phosphates, 2 packet, PRN  prochlorperazine, 2.5 mg, Q6H PRN  sodium chloride 0.9%, 10 mL, Q12H PRN  sodium chloride 0.9%, 10 mL, PRN      Today I personally reviewed pertinent medications, lines/drains/airways, laboratory results, microbiology results, notably:      Laboratory:  CBC:  Recent Labs   Lab 01/26/23  2342   WBC 7.05   RBC 2.52*   HGB 7.2*   HCT 22.5*   *   MCV 89   MCH 28.6   MCHC 32.0   ]    CMP:  Recent Labs   Lab 01/26/23  2342   CALCIUM 7.9*   PROT 5.4*   *   K 4.6   CO2 23      BUN 19   CREATININE 0.5   ALKPHOS 93   ALT 29   AST 48*   BILITOT 1.9*       Microbiology:  Microbiology Results (last 7 days)       Procedure Component Value Units Date/Time    Aerobic culture [[id number]]  (Abnormal) Collected: 01/24/23 0943    Order Status: Completed Specimen: Bone from Back Updated: 01/27/23 1153     Aerobic Bacterial Culture GRAM NEGATIVE VINCENT  From broth only  Identification and susceptibility pending      Narrative:      4) Perispinal    Blood culture [[id number]] Collected: 01/27/23  0120    Order Status: Completed Specimen: Blood from Peripheral, Forearm, Right Updated: 01/27/23 0915     Blood Culture, Routine No Growth to date    Blood culture [[id number]] Collected: 01/27/23 0123    Order Status: Completed Specimen: Blood from Peripheral, Antecubital, Left Updated: 01/27/23 0915     Blood Culture, Routine No Growth to date    Aerobic culture [[id number]]  (Abnormal)  (Susceptibility) Collected: 01/24/23 0914    Order Status: Completed Specimen: Wound from Back Updated: 01/26/23 1306     Aerobic Bacterial Culture ESCHERICHIA COLI  Rare      Narrative:      1) Perispinal    Aerobic culture [[id number]]  (Abnormal)  (Susceptibility) Collected: 01/24/23 0914    Order Status: Completed Specimen: Wound from Back Updated: 01/26/23 1306     Aerobic Bacterial Culture ESCHERICHIA COLI ESBL  Few      Narrative:      2) Perispinal    Aerobic culture [[id number]]  (Abnormal)  (Susceptibility) Collected: 01/24/23 0943    Order Status: Completed Specimen: Bone from Back Updated: 01/26/23 1252     Aerobic Bacterial Culture ESCHERICHIA COLI ESBL  Few      Narrative:      3) Perispinal    Culture, Anaerobe [[id number]] Collected: 01/24/23 0943    Order Status: Completed Specimen: Bone from Back Updated: 01/26/23 1238     Anaerobic Culture Culture in progress    Narrative:      4) Perispinal    Culture, Anaerobe [[id number]] Collected: 01/24/23 0943    Order Status: Completed Specimen: Bone from Back Updated: 01/26/23 1237     Anaerobic Culture Culture in progress    Narrative:      3) Perispinal    Culture, Anaerobe [[id number]] Collected: 01/24/23 0913    Order Status: Completed Specimen: Body Fluid from Back Updated: 01/26/23 1237     Anaerobic Culture Culture in progress    Narrative:      1) Perispinal    AFB Culture & Smear [[id number]] Collected: 01/24/23 0943    Order Status: Completed Specimen: Bone from Back Updated: 01/25/23 2127     AFB Culture & Smear Culture in progress     AFB CULTURE STAIN No acid fast  bacilli seen.    Narrative:      4) Perispinal    AFB Culture & Smear [[id number]] Collected: 01/24/23 0943    Order Status: Completed Specimen: Bone from Back Updated: 01/25/23 2127     AFB Culture & Smear Culture in progress     AFB CULTURE STAIN No acid fast bacilli seen.    Narrative:      3) Perispinal    AFB Culture & Smear [[id number]] Collected: 01/24/23 0913    Order Status: Completed Specimen: Body Fluid from Back Updated: 01/25/23 2127     AFB Culture & Smear Culture in progress     AFB CULTURE STAIN No acid fast bacilli seen.    Narrative:      1) Perispinal    AFB Culture & Smear [[id number]] Collected: 01/24/23 0913    Order Status: Completed Specimen: Body Fluid from Back Updated: 01/25/23 2127     AFB Culture & Smear Culture in progress     AFB CULTURE STAIN No acid fast bacilli seen.    Narrative:      2) Perispinal    Blood culture [[id number]] Collected: 01/20/23 1131    Order Status: Completed Specimen: Blood Updated: 01/25/23 1412     Blood Culture, Routine No growth after 5 days.    Narrative:      Collection has been rescheduled by [facility] at 01/20/2023 11:03 Reason:   Patient unavailable in room with doctors   Collection has been rescheduled by [facility] at 01/20/2023 11:03 Reason:   Patient unavailable in room with doctors     Blood culture [[id number]] Collected: 01/20/23 1131    Order Status: Completed Specimen: Blood Updated: 01/25/23 1412     Blood Culture, Routine No growth after 5 days.    Narrative:      Collection has been rescheduled by [facility] at 01/20/2023 11:03 Reason:   Patient unavailable in room with doctors   Collection has been rescheduled by [facility] at 01/20/2023 11:03 Reason:   Patient unavailable in room with doctors     Culture, Anaerobe [[id number]] Collected: 01/24/23 0913    Order Status: Completed Specimen: Body Fluid from Back Updated: 01/25/23 0658     Anaerobic Culture Culture in progress    Narrative:      2) Perispinal    Gram stain [[id number]] Collected: 01/24/23 0913    Order Status:  Completed Specimen: Body Fluid from Back Updated: 01/24/23 1239     Gram Stain Result No WBC's      No organisms seen    Narrative:      1) Perispinal    Gram stain [[id number]] Collected: 01/24/23 0944    Order Status: Completed Specimen: Bone from Back Updated: 01/24/23 1238     Gram Stain Result Rare WBC's      No organisms seen    Narrative:      3) Perispinal    Gram stain [[id number]] Collected: 01/24/23 0913    Order Status: Completed Specimen: Body Fluid from Back Updated: 01/24/23 1237     Gram Stain Result Rare WBC's      No organisms seen    Narrative:      2) Perispinal    Gram stain [[id number]] Collected: 01/24/23 0943    Order Status: Completed Specimen: Bone from Back Updated: 01/24/23 1236     Gram Stain Result No WBC's      No organisms seen    Narrative:      4) Perispinal    Fungus culture [[id number]] Collected: 01/24/23 0943    Order Status: Sent Specimen: Bone from Back Updated: 01/24/23 1009    Fungus culture [[id number]] Collected: 01/24/23 0943    Order Status: Sent Specimen: Bone from Back Updated: 01/24/23 1007    Fungus culture [[id number]] Collected: 01/24/23 0913    Order Status: Sent Specimen: Body Fluid from Back Updated: 01/24/23 0929    Fungus culture [[id number]] Collected: 01/24/23 0913    Order Status: Sent Specimen: Body Fluid from Back Updated: 01/24/23 0927    Urine culture [[id number]] Collected: 01/20/23 0130    Order Status: Completed Specimen: Urine Updated: 01/21/23 1144     Urine Culture, Routine Multiple organisms isolated. None in predominance.  Repeat if      clinically necessary.    Narrative:      Specimen Source->Urine             Diet  Diet Adult Regular (IDDSI Level 7)  Diet Adult Regular (IDDSI Level 7)        Assessment/Plan:     Psychiatric  Anxiety and depression  Continue Buproprion for depression  Continue Lorazepam for anxiety  Continue Gabapentin for neuropathy and anxiety    Substance use disorder  Denies IV drug use (meth) for past 3 years.  Denies alcohol and  tobacco abuse.       ID  Thoracic discitis  CT T/L spine thin cut 1/20 with haloing of pelvic and T10 screws, spondylodiscitis T8-9, T9-10  CT C spine with concern for discitis at C6-7  Patient is currently afebrile with chronic pancytopenia  Previously on Amoxicillin up until surgery for chronic suppressive therapy.  ID following  Blood cx (1/20) NGTD  Intra-operative cultures with GNRs  Continue Vancomycin and meropenem  Trend CBC q12h    GI  Chronic hepatitis C with cirrhosis  Hx of Hep C. See Cirrhosis of Liver    Cirrhosis of liver with ascites  Patient has reportedly refused transplant evaluation in the past.   -Daily CMP and trend LFTs  -Continue Lactulose 20g TID   -Will need GI/hepatology follow up outpatient    MELD-Na score: 18 at 1/26/2023 11:42 PM  MELD score: 13 at 1/26/2023 11:42 PM  Calculated from:  Serum Creatinine: 0.5 mg/dL (Using min of 1 mg/dL) at 1/26/2023 11:42 PM  Serum Sodium: 131 mmol/L at 1/26/2023 11:42 PM  Total Bilirubin: 1.9 mg/dL at 1/26/2023 11:42 PM  INR(ratio): 1.4 at 1/25/2023  1:08 AM  Age: 42 years    Orthopedic  * Weakness of both lower extremities  43 yo F w/ history of multiple spinal surgeries presented to OSH with possible infection of patient's shoulder and found to have UTI and E. Coli bacteremia and progressively worse B/L LE weakness. Transferred to [facility] for neurosurgical evaluation. Underwent Laminectomy T8-T10; Posterior spinal fusion T8-T12; hardware removal and washout. Admitted to [facility] for H[facility]. Arrived to unit on PCA dilaudid pump. Patient is HDS on 3L NC.     -Neuro checks q2hr  -Vital signs q2hr  -NSGY following  -MAP goals >65  -SBP goals <180  -PRN labetalol, hydralazine  -Patient will need to lie flat with HOB 15-20  -Started on Vancomycin and meropenem, PICC consulted  -MM pain regimen; PCA Dilaudid pump for pain management  -Aggressive bowel regimen  -TSH, Lipid panel, and A1C pending  -PT/INR, aPTT   -CMP, Mag, Phos daily, CBC q12hr  -Transfuse for  Hg<7  -PT/OT          The patient is being Prophylaxed for:  Venous Thromboembolism with: Mechanical or Chemical  Stress Ulcer with: Not Applicable   Ventilator Pneumonia with: not applicable    Activity Orders          Turn patient starting at 01/24 1800    Up in chair With meals starting at 01/24 1700    Diet Adult Regular (IDDSI Level 7): Regular starting at 01/24 1106    Progressive Mobility Protocol (mobilize patient to their highest level of functioning at least twice daily) starting at 01/24 1105    Elevate HOB 30 starting at 01/24 1105    Ambulate With Assistance, Post Op Day 0 If the patient arrives from PACU by 4PM, walk at least once on day of operation if alert and safe to do so. starting at 01/24 1104        Full Code    Vaishnavi Adames PA-C  Neurocritical Care  [facility] - Neuro Critical Care

## 2023-01-28 NOTE — ASSESSMENT & PLAN NOTE
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 [place] [facility] [place]    OR yesterday for temporary T6-12 PSIF. Taz pus noted intraoperatively.   Given intraoperative findings, new operative plan for T4-pelvis revision and extension of fusion with T9-10 corpectomy with plastic surgery assistance planned for 2/2/23    Plan:  Admitted to ICU, Keep in ICU on logroll precautions until stage 2 Thursday.    -- MRI C/T/L spine 1/20 with C7 SP enhancement, focal kyphosis at T8-10 with severe anterior height loss at T9, enhancing C6 inferior endplate Schmorl's node  -- CT T/L spine thin cut 1/20 with haloing of pelvic and T10 screws, spondylodiscitis T8-9, T9-10  -- CT C spine with concern for discitis at C6-7  -- flex ext XR done, limited view of C6-7 in swimmers view 2/2 shoulders  -- 1/20 ESR 70, CRP 9.4. elevated from prior   -- TLSO at bedside  --HV x2 to gravity, WV in place, monitor ouput  --ID: Vanc/merropenam   -- BCx 1/20: NGTD   -- OR cultures 1/24 gram stain negative, cultures pending  --OR 2/2/23 as above   -- will obtain informed consent   -- NPO 2/1/23 MN  -- multimodal pain control per primary medicine team  NSGY will continue to follow. Please contact team with any further questions.

## 2023-01-28 NOTE — ASSESSMENT & PLAN NOTE
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  ID following  Blood cx (1/20) NGTD  Intra-operative cultures with GNRs  Continue Vancomycin and meropenem  Trend CBC q12h

## 2023-01-28 NOTE — ASSESSMENT & PLAN NOTE
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 Transferred to [facility] [place]  and neurosurgery was consulted    OR yesterday for temporary T6-12 PSIF. Taz pus noted intraoperatively.   Given intraoperative findings, new operative plan for T4-pelvis revision and extension of fusion with T9-10 corpectomy with plastic surgery assistance planned for 2/2/23    Plan:  Admitted to ICU, Keep in ICU on logroll precautions until stage 2 Thursday.    -- MRI C/T/L spine 1/20 with C7 SP enhancement, focal kyphosis at T8-10 with severe anterior height loss at T9, enhancing C6 inferior endplate Schmorl's node  -- CT T/L spine thin cut 1/20 with haloing of pelvic and T10 screws, spondylodiscitis T8-9, T9-10  -- CT C spine with concern for discitis at C6-7  -- flex ext XR done, limited view of C6-7 in swimmers view 2/2 shoulders  -- 1/20 ESR 70, CRP 9.4. elevated from prior   -- TLSO at bedside  --HV x2 to gravity, WV in place, monitor ouput  --ID: Vanc/merropenam   -- BCx 1/20: NGTD   -- OR cultures 1/24: E. coli  --OR 2/2/23 as above   -- will obtain informed consent   -- NPO 2/1/23 midnight  -- multimodal pain control per primary medicine team  NSGY will continue to follow. Please contact team with any further questions.

## 2023-01-28 NOTE — PROGRESS NOTES
[facility]  Neurosurgery  Progress Note    Subjective:     History of Present Illness: Ms. Zazueta is a 42 y.o F w/ hx ofIV drug use (methamphetamine), chronic HCV with cirrhosis, spinal fusion (04/2021), epidural abscess with removal of hardware (02/2022), spinal fusion with hardware (T10 - Pelvis / 03/2022), obesity (BMI 39.3) and neurogenic bladder and recent shoulder surgery who initially presented to [facility] for evaluation of back pain and left leg weakness.  She reports initially noting the left leg weakness when she was about to go to the bathroom and was about to fall but was assisted by her boyfriend.  She was brought to the ED via EMS.  Urinalysis with UTI concerns and blood cultures at OSH grew ESBL E coli, and shoulder effusion concern for infection so she was treated with meropenem.  During hospitalization, she reports the weakness that started out in her left leg initially then spread upwards to her left thigh, left hip, then crossed over to the right hip and spread to her right leg.  Denies recent IV drug use. She reports her last incidence of drug use was more than 3 years ago and also denies tobacco, and alcohol abuse.  Reports cannabis use.     OSH course notable for concerning urinalysis and blood cultures positive for ESBL E coli treated with meropenem.  She was also admit to the ICU where she was treated with Precedex for significant body aches, irritability, and muscle spasms.  Concerns of a murmur on physical exam so she underwent TTE which did not show any vegetations.  Worsening lower extremity weakness workup with MRI head nonspecific, MRI cervical spine with multilevel spondylosis, X-ray spine with multilevel thoracolumbar fusion and diskectomy, focal kyphosis at T9-10 increased compared to prior.  She was started on prophylaxis amoxicillin due to her history of infected hardware.  MRI spine unable to be completed due to patient's body habitus so she was  transferred to [facility] [place] imaging and Neurosurgery, Neurology evaluation.      Post-Op Info:  Procedure(s) (LRB):  **AIRO** T8-T12 POSTERIOR FUSION, T8-T10 LAMINECTOMY, HARDWARE REMOVAL AND WASHOUT (N/A)   3 Days Post-Op     Interval History: 1/27: NAEON, exam stable. Stage 2 OR date changed to Thursday 2/2    Medications:  Continuous Infusions:   hydromorphone in 0.9 % NaCl 6 mg/30 ml       Scheduled Meds:   acetaminophen  1,000 mg Oral TID    buPROPion  300 mg Oral Daily    gabapentin  600 mg Oral TID    lactulose  20 g Oral TID    meropenem (MERREM) IVPB  2 g Intravenous Q8H    methocarbamoL  1,000 mg Oral QID    pantoprazole  40 mg Oral Daily    polyethylene glycol  17 g Oral BID    senna-docusate 8.6-50 mg  2 tablet Oral BID    sodium chloride 0.9%  10 mL Intravenous Q6H     PRN Meds:sodium chloride, sodium chloride, dextrose 10%, dextrose 10%, diazePAM, glucagon (human recombinant), glucose, glucose, hydrALAZINE, labetalol, magnesium hydroxide 400 mg/5 ml, magnesium oxide, magnesium oxide, melatonin, naloxone, ondansetron, oxyCODONE, polyethylene glycol, potassium bicarbonate, potassium bicarbonate, potassium bicarbonate, potassium, sodium phosphates, potassium, sodium phosphates, potassium, sodium phosphates, prochlorperazine, sodium chloride 0.9%, Flushing PICC Protocol **AND** sodium chloride 0.9% **AND** sodium chloride 0.9%     Review of Systems  Objective:     Weight: 104 kg (229 lb 4.5 oz)  Body mass index is 35.91 kg/m².  Vital Signs (Most Recent):  Temp: 98.6 °F (37 °C) (01/27/23 1652)  Pulse: (!) 113 (01/27/23 1652)  Resp: 20 (01/27/23 1739)  BP: (!) 110/58 (01/27/23 1652)  SpO2: 100 % (01/27/23 1652) Vital Signs (24h Range):  Temp:  [97.9 °F (36.6 °C)-98.6 °F (37 °C)] 98.6 °F (37 °C)  Pulse:  [110-118] 113  Resp:  [10-34] 20  SpO2:  [95 %-100 %] 100 %  BP: (102-132)/(54-71) 110/58     Date 01/27/23 0700 - 01/28/23 0659   Shift 2443-3401 7676-6620 3662-5492 24 Hour Total   INTAKE    P.O. 450   450   I.V.(mL/kg) 40(0.4)   40(0.4)   Shift Total(mL/kg) 490(4.7)   490(4.7)   OUTPUT   Urine(mL/kg/hr) 270(0.3)   270   Drains 210   210   Other 0   0   Shift Total(mL/kg) 480(4.6)   480(4.6)   Weight (kg) 104 104 104 104                Oxygen Concentration (%):  [24] 24           Neurosurgery Physical Exam  General: well developed, well nourished, no distress.   Head: normocephalic, atraumatic  Neurologic: Alert and oriented. Thought content appropriate.  GCS: Motor: 6/Verbal: 5/Eyes: 4 GCS Total: 15  Mental Status: Awake, Alert, Oriented x 4  Language: No aphasia  Speech: No dysarthria  Cranial nerves: face symmetric, tongue midline, CN II-XII grossly intact.   Eyes: pupils equal, round, reactive to light with accommodation, EOMI, nystagmus.   Pulmonary: normal respirations, no signs of respiratory distress  Abdomen: soft, non-distended, not tender to palpation  Skin: Skin is warm, dry and intact.  Sensory: intact to light touch throughout     Motor Strength:Moves all extremities spontaneously with good tone.  Full strength upper and extremities. No abnormal movements seen.      Strength   Deltoids Triceps Biceps Wrist Extension Wrist Flexion Hand    Upper: R 5/5 5/5 5/5 5/5 5/5 5/5     L 5/5 5/5 5/5 5/5 5/5 5/5       Iliopsoas Quadriceps Knee  Flexion Tibialis  anterior Gastro- cnemius EHL   Lower: R 3/5 3/5 3/5 4-/5 4-/5 4-/5     L 3/5 3/5 3/5 4-/5 4-/5 4-/5      Reflexes:   DTR: 2+ symmetrically throughout.  Parker's: Negative.  Babinski's: Present bilaterally  Clonus: 2 beats bilateral lower extremity     Incision well healed       Significant Labs:  Recent Labs   Lab 01/26/23  0208 01/26/23  2342 01/27/23  1215   * 114*  --    * 131*  --    K 4.6 4.6  --     105  --    CO2 22* 23  --    BUN 25* 19  --    CREATININE 0.6 0.5  --    CALCIUM 7.7* 7.9*  --    MG 1.6 1.5* 1.6       Recent Labs   Lab 01/26/23  1312 01/26/23  2342 01/27/23  1215   WBC 7.73 7.05 6.24   HGB 7.5*  7.2* 6.8*   HCT 23.7* 22.5* 21.7*   * 121* 114*       No results for input(s): LABPT, INR, APTT in the last 48 hours.    Microbiology Results (last 7 days)       Procedure Component Value Units Date/Time    Aerobic culture [[id number]]  (Abnormal) Collected: 01/24/23 0943    Order Status: Completed Specimen: Bone from Back Updated: 01/27/23 1153     Aerobic Bacterial Culture GRAM NEGATIVE VINCENT  From broth only  Identification and susceptibility pending      Narrative:      4) Perispinal    Blood culture [[id number]] Collected: 01/27/23 0120    Order Status: Completed Specimen: Blood from Peripheral, Forearm, Right Updated: 01/27/23 0915     Blood Culture, Routine No Growth to date    Blood culture [[id number]] Collected: 01/27/23 0123    Order Status: Completed Specimen: Blood from Peripheral, Antecubital, Left Updated: 01/27/23 0915     Blood Culture, Routine No Growth to date    Aerobic culture [[id number]]  (Abnormal)  (Susceptibility) Collected: 01/24/23 0914    Order Status: Completed Specimen: Wound from Back Updated: 01/26/23 1306     Aerobic Bacterial Culture ESCHERICHIA COLI  Rare      Narrative:      1) Perispinal    Aerobic culture [[id number]]  (Abnormal)  (Susceptibility) Collected: 01/24/23 0914    Order Status: Completed Specimen: Wound from Back Updated: 01/26/23 1306     Aerobic Bacterial Culture ESCHERICHIA COLI ESBL  Few      Narrative:      2) Perispinal    Aerobic culture [[id number]]  (Abnormal)  (Susceptibility) Collected: 01/24/23 0943    Order Status: Completed Specimen: Bone from Back Updated: 01/26/23 1252     Aerobic Bacterial Culture ESCHERICHIA COLI ESBL  Few      Narrative:      3) Perispinal    Culture, Anaerobe [[id number]] Collected: 01/24/23 0943    Order Status: Completed Specimen: Bone from Back Updated: 01/26/23 1238     Anaerobic Culture Culture in progress    Narrative:      4) Perispinal    Culture, Anaerobe [[id number]] Collected: 01/24/23 0943    Order Status: Completed  Specimen: Bone from Back Updated: 01/26/23 1237     Anaerobic Culture Culture in progress    Narrative:      3) Perispinal    Culture, Anaerobe [[id number]] Collected: 01/24/23 0913    Order Status: Completed Specimen: Body Fluid from Back Updated: 01/26/23 1237     Anaerobic Culture Culture in progress    Narrative:      1) Perispinal    AFB Culture & Smear [[id number]] Collected: 01/24/23 0943    Order Status: Completed Specimen: Bone from Back Updated: 01/25/23 2127     AFB Culture & Smear Culture in progress     AFB CULTURE STAIN No acid fast bacilli seen.    Narrative:      4) Perispinal    AFB Culture & Smear [[id number]] Collected: 01/24/23 0943    Order Status: Completed Specimen: Bone from Back Updated: 01/25/23 2127     AFB Culture & Smear Culture in progress     AFB CULTURE STAIN No acid fast bacilli seen.    Narrative:      3) Perispinal    AFB Culture & Smear [[id number]] Collected: 01/24/23 0913    Order Status: Completed Specimen: Body Fluid from Back Updated: 01/25/23 2127     AFB Culture & Smear Culture in progress     AFB CULTURE STAIN No acid fast bacilli seen.    Narrative:      1) Perispinal    AFB Culture & Smear [[id number]] Collected: 01/24/23 0913    Order Status: Completed Specimen: Body Fluid from Back Updated: 01/25/23 2127     AFB Culture & Smear Culture in progress     AFB CULTURE STAIN No acid fast bacilli seen.    Narrative:      2) Perispinal    Blood culture [[id number]] Collected: 01/20/23 1131    Order Status: Completed Specimen: Blood Updated: 01/25/23 1412     Blood Culture, Routine No growth after 5 days.    Narrative:      Collection has been rescheduled by [facility] at 01/20/2023 11:03 Reason:   Patient unavailable in room with doctors   Collection has been rescheduled by 2 at 01/20/2023 11:03 Reason:   Patient unavailable in room with doctors     Blood culture [[id number]] Collected: 01/20/23 1131    Order Status: Completed Specimen: Blood Updated: 01/25/23 1412     Blood Culture,  Routine No growth after 5 days.    Narrative:      Collection has been rescheduled by [facility] at 01/20/2023 11:03 Reason:   Patient unavailable in room with doctors   Collection has been rescheduled by [facility] at 01/20/2023 11:03 Reason:   Patient unavailable in room with doctors     Culture, Anaerobe [[id number]] Collected: 01/24/23 0913    Order Status: Completed Specimen: Body Fluid from Back Updated: 01/25/23 0658     Anaerobic Culture Culture in progress    Narrative:      2) Perispinal    Gram stain [[id number]] Collected: 01/24/23 0913    Order Status: Completed Specimen: Body Fluid from Back Updated: 01/24/23 1239     Gram Stain Result No WBC's      No organisms seen    Narrative:      1) Perispinal    Gram stain [[id number]] Collected: 01/24/23 0944    Order Status: Completed Specimen: Bone from Back Updated: 01/24/23 1238     Gram Stain Result Rare WBC's      No organisms seen    Narrative:      3) Perispinal    Gram stain [[id number]] Collected: 01/24/23 0913    Order Status: Completed Specimen: Body Fluid from Back Updated: 01/24/23 1237     Gram Stain Result Rare WBC's      No organisms seen    Narrative:      2) Perispinal    Gram stain [[id number]] Collected: 01/24/23 0943    Order Status: Completed Specimen: Bone from Back Updated: 01/24/23 1236     Gram Stain Result No WBC's      No organisms seen    Narrative:      4) Perispinal    Fungus culture [[id number]] Collected: 01/24/23 0943    Order Status: Sent Specimen: Bone from Back Updated: 01/24/23 1009    Fungus culture [[id number]] Collected: 01/24/23 0943    Order Status: Sent Specimen: Bone from Back Updated: 01/24/23 1007    Fungus culture [[id number]] Collected: 01/24/23 0913    Order Status: Sent Specimen: Body Fluid from Back Updated: 01/24/23 0929    Fungus culture [[id number]] Collected: 01/24/23 0913    Order Status: Sent Specimen: Body Fluid from Back Updated: 01/24/23 0927    Urine culture [[id number]] Collected: 01/20/23 0130    Order Status: Completed  Specimen: Urine Updated: 01/21/23 1144     Urine Culture, Routine Multiple organisms isolated. None in predominance.  Repeat if      clinically necessary.    Narrative:      Specimen Source->Urine          All pertinent labs from the last 24 hours have been reviewed.    Significant Diagnostics:  CT C-spine:  Vertebrae: The dens, lateral masses, and occipital condyles are intact.  There is no evidence of fracture or dislocation.     Discs: Multilevel disc height loss and degenerative endplate change.  Prominent C6 inferior endplate Schmorl's node, similar in appearance dating back to MRI from 02/14/2022.     Degenerative changes: Sent spinal canal dimensions at C3-C4 are 10.5 mm, 8.5 mm at C4-C5, 10.0 mm at C5-C6, and 10.8 mm at C6-C7 .  Multilevel uncovertebral spurring with multilevel neural foraminal narrowing most pronounced at C5-C6 with moderate right neural foraminal narrowing and C6-C7 with moderate left neural foraminal narrowing.    Physical Exam  Neurosurgery Physical Exam    Assessment/Plan:     * Weakness of both lower extremities  Pt is 42F multiple spinal surgeries and revisions last T10- Pelvis in March 2022 who presented to OSH with concern for shoulder infection and UTI found to have E coli sepsis who has had progressive worsening BLE weakness, XR showed possible worsening proximal junctional kyphotic deformity. Transferred to [facility] to  and neurosurgery was consulted    OR yesterday for temporary T6-12 PSIF. Taz pus noted intraoperatively.   Given intraoperative findings, new operative plan for T4-pelvis revision and extension of fusion with T9-10 corpectomy with plastic surgery assistance planned for 2/2/23    Plan:  Admitted to ICU, Keep in ICU on logroll precautions until stage 2 Thursday.    -- MRI C/T/L spine 1/20 with C7 SP enhancement, focal kyphosis at T8-10 with severe anterior height loss at T9, enhancing C6 inferior endplate Schmorl's node  -- CT T/L spine thin cut 1/20 with haloing of  pelvic and T10 screws, spondylodiscitis T8-9, T9-10  -- CT C spine with concern for discitis at C6-7  -- flex ext XR done, limited view of C6-7 in swimmers view 2/2 shoulders  -- 1/20 ESR 70, CRP 9.4. elevated from prior   -- TLSO at bedside  --HV x2 to gravity, WV in place, monitor ouput  --ID: Vanc/merropenam   -- BCx 1/20: NGTD   -- OR cultures 1/24 gram stain negative, cultures pending  --OR 2/2/23 as above   -- will obtain informed consent   -- NPO 2/1/23 MN  -- multimodal pain control per primary medicine team  NSGY will continue to follow. Please contact team with any further questions.        Mabel Chang MD  Neurosurgery  [facility]

## 2023-01-28 NOTE — PLAN OF CARE
[facility] Care Plan    POC reviewed with Rachel Zazueta at 1500. Pt verbalized understanding. Questions and concerns addressed. No acute events today. Pt progressing toward goals. Will continue to monitor. See below and flowsheets for full assessment and VS info.             Is this a stroke patient? no    Neuro:  [place] Coma Scale  Best Eye Response: 4-->(E4) spontaneous  Best Motor Response: 6-->(M6) obeys commands  Best Verbal Response: 5-->(V5) oriented  [place] Coma Scale Score: 15  Assessment Qualifiers: patient not sedated/intubated  Pupil PERRLA: yes     24 hr Temp:  [97.9 °F (36.6 °C)-98.6 °F (37 °C)]     CV:   Rhythm: sinus tachycardia  BP goals:   SBP < 140  MAP > 65    Resp:      Oxygen Concentration (%): 24    Plan:  wean from nasal cannula    GI/:     Diet/Nutrition Received: regular  Last Bowel Movement: 01/23/23  Voiding Characteristics: external catheter    Intake/Output Summary (Last 24 hours) at 1/27/2023 1952  Last data filed at 1/27/2023 1801  Gross per 24 hour   Intake 1985.78 ml   Output 1675 ml   Net 310.78 ml     Unmeasured Output  Urine Occurrence: 1  Stool Occurrence: 0  Pad Count: 2    Labs/Accuchecks:  Recent Labs   Lab 01/27/23  1215   WBC 6.24   RBC 2.40*   HGB 6.8*   HCT 21.7*   *      Recent Labs   Lab 01/26/23  2342   *   K 4.6   CO2 23      BUN 19   CREATININE 0.5   ALKPHOS 93   ALT 29   AST 48*   BILITOT 1.9*      Recent Labs   Lab 01/25/23  0108   INR 1.4*   APTT 25.0    No results for input(s): CPK, CPKMB, TROPONINI, MB in the last 168 hours.    Electrolytes: Electrolytes replaced  Accuchecks: none    Gtts:   hydromorphone in 0.9 % NaCl 6 mg/30 ml         LDA/Wounds:  Lines/Drains/Airways       Peripherally Inserted Central Catheter Line  Duration             PICC Double Lumen 01/26/23 1209 left basilic 1 day              Drain  Duration                  Closed/Suction Drain 01/24/23 Posterior Back Accordion 10 Fr. 3 days         Closed/Suction Drain  01/24/23 Posterior;Right Back Accordion 10 Fr. 3 days    Female External Urinary Catheter 01/27/23 1115 <1 day              Peripheral Intravenous Line  Duration                  Midline Catheter Insertion/Assessment  - Single Lumen 01/12/23 2138 Right basilic vein (medial side of arm) 18g x 10cm 14 days                  Wounds: Yes; spinal incisions  Wound care consulted: No

## 2023-01-28 NOTE — SUBJECTIVE & OBJECTIVE
Interval History: 1/28: neuro stable. OR Thursday with nsgy for T9-T10 corpectomy and plastics closure. HV in place, Hb low 7s.    Medications:  Continuous Infusions:   hydromorphone in 0.9 % NaCl 6 mg/30 ml       Scheduled Meds:   acetaminophen  1,000 mg Oral TID    buPROPion  300 mg Oral Daily    gabapentin  600 mg Oral TID    lactulose  20 g Oral TID    meropenem (MERREM) IVPB  2 g Intravenous Q8H    methocarbamoL  1,000 mg Oral QID    pantoprazole  40 mg Oral Daily    polyethylene glycol  17 g Oral BID    senna-docusate 8.6-50 mg  2 tablet Oral BID    sodium chloride 0.9%  10 mL Intravenous Q6H     PRN Meds:sodium chloride, sodium chloride, dextrose 10%, dextrose 10%, diazePAM, glucagon (human recombinant), glucose, glucose, hydrALAZINE, labetalol, magnesium hydroxide 400 mg/5 ml, magnesium oxide, magnesium oxide, melatonin, naloxone, ondansetron, oxyCODONE, polyethylene glycol, potassium bicarbonate, potassium bicarbonate, potassium bicarbonate, potassium, sodium phosphates, potassium, sodium phosphates, potassium, sodium phosphates, prochlorperazine, sodium chloride 0.9%, Flushing PICC Protocol **AND** sodium chloride 0.9% **AND** sodium chloride 0.9%     Review of Systems  Objective:     Weight: 104 kg (229 lb 4.5 oz)  Body mass index is 35.91 kg/m².  Vital Signs (Most Recent):  Temp: 98.5 °F (36.9 °C) (01/28/23 0701)  Pulse: 109 (01/28/23 1001)  Resp: 19 (01/28/23 1001)  BP: 107/65 (01/28/23 1001)  SpO2: 100 % (01/28/23 1001) Vital Signs (24h Range):  Temp:  [97.9 °F (36.6 °C)-98.6 °F (37 °C)] 98.5 °F (36.9 °C)  Pulse:  [106-117] 109  Resp:  [10-30] 19  SpO2:  [98 %-100 %] 100 %  BP: (100-152)/(54-82) 107/65     Date 01/28/23 0700 - 01/29/23 0659   Shift 3210-7376 6356-3700 7073-9318 24 Hour Total   INTAKE   P.O. 300   300   IV Piggyback 198.9   198.9   Shift Total(mL/kg) 498.9(4.8)   498.9(4.8)   OUTPUT   Urine(mL/kg/hr) 350   350   Drains 0   0   Other 0   0   Shift Total(mL/kg) 350(3.4)   350(3.4)    Weight (kg) 104 104 104 104                Oxygen Concentration (%):  [24] 24           Neurosurgery Physical Exam  General: well developed, well nourished, no distress.   Head: normocephalic, atraumatic  Neurologic: Alert and oriented. Thought content appropriate.  GCS: Motor: 6/Verbal: 5/Eyes: 4 GCS Total: 15  Mental Status: Awake, Alert, Oriented x 4  Language: No aphasia  Speech: No dysarthria  Cranial nerves: face symmetric, tongue midline, CN II-XII grossly intact.   Eyes: pupils equal, round, reactive to light with accommodation, EOMI, nystagmus.   Pulmonary: normal respirations, no signs of respiratory distress  Abdomen: soft, non-distended, not tender to palpation  Skin: Skin is warm, dry and intact.  Sensory: intact to light touch throughout     Motor Strength:Moves all extremities spontaneously with good tone.  Full strength upper and extremities. No abnormal movements seen.      Strength   Deltoids Triceps Biceps Wrist Extension Wrist Flexion Hand    Upper: R 5/5 5/5 5/5 5/5 5/5 5/5     L 5/5 5/5 5/5 5/5 5/5 5/5       Iliopsoas Quadriceps Knee  Flexion Tibialis  anterior Gastro- cnemius EHL   Lower: R 3/5 4+/5 4+/5 4/5 4-/5 4/5     L 3/5 4+/5 4+/5 4/5 4-/5 4/5      Reflexes:   DTR: 2+ symmetrically throughout.  Parker's: Negative.  Babinski's: Present bilaterally  Clonus: 2 beats bilateral lower extremity     Incision well healed       Significant Labs:  Recent Labs   Lab 01/26/23  2342 01/27/23  1215 01/28/23  0425   *  --  98   *  --  132*   K 4.6  --  4.1     --  102   CO2 23  --  25   BUN 19  --  16   CREATININE 0.5  --  0.5   CALCIUM 7.9*  --  8.0*   MG 1.5* 1.6 1.6       Recent Labs   Lab 01/27/23  1215 01/27/23  2121 01/28/23  0832   WBC 6.24 7.11 5.22   HGB 6.8* 7.2* 7.4*   HCT 21.7* 22.7* 23.3*   * 102* 93*       No results for input(s): LABPT, INR, APTT in the last 48 hours.    Microbiology Results (last 7 days)       Procedure Component Value Units Date/Time     Blood culture [[id number]] Collected: 01/27/23 0120    Order Status: Completed Specimen: Blood from Peripheral, Forearm, Right Updated: 01/28/23 0613     Blood Culture, Routine No Growth to date      No Growth to date    Blood culture [[id number]] Collected: 01/27/23 0123    Order Status: Completed Specimen: Blood from Peripheral, Antecubital, Left Updated: 01/28/23 0613     Blood Culture, Routine No Growth to date      No Growth to date    Aerobic culture [[id number]]  (Abnormal) Collected: 01/24/23 0943    Order Status: Completed Specimen: Bone from Back Updated: 01/27/23 1153     Aerobic Bacterial Culture GRAM NEGATIVE VINCENT  From broth only  Identification and susceptibility pending      Narrative:      4) Perispinal    Aerobic culture [[id number]]  (Abnormal)  (Susceptibility) Collected: 01/24/23 0914    Order Status: Completed Specimen: Wound from Back Updated: 01/26/23 1306     Aerobic Bacterial Culture ESCHERICHIA COLI  Rare      Narrative:      1) Perispinal    Aerobic culture [[id number]]  (Abnormal)  (Susceptibility) Collected: 01/24/23 0914    Order Status: Completed Specimen: Wound from Back Updated: 01/26/23 1306     Aerobic Bacterial Culture ESCHERICHIA COLI ESBL  Few      Narrative:      2) Perispinal    Aerobic culture [[id number]]  (Abnormal)  (Susceptibility) Collected: 01/24/23 0943    Order Status: Completed Specimen: Bone from Back Updated: 01/26/23 1252     Aerobic Bacterial Culture ESCHERICHIA COLI ESBL  Few      Narrative:      3) Perispinal    Culture, Anaerobe [[id number]] Collected: 01/24/23 0943    Order Status: Completed Specimen: Bone from Back Updated: 01/26/23 1238     Anaerobic Culture Culture in progress    Narrative:      4) Perispinal    Culture, Anaerobe [[id number]] Collected: 01/24/23 0943    Order Status: Completed Specimen: Bone from Back Updated: 01/26/23 1237     Anaerobic Culture Culture in progress    Narrative:      3) Perispinal    Culture, Anaerobe [[id number]] Collected:  01/24/23 0913    Order Status: Completed Specimen: Body Fluid from Back Updated: 01/26/23 1237     Anaerobic Culture Culture in progress    Narrative:      1) Perispinal    AFB Culture & Smear [[id number]] Collected: 01/24/23 0943    Order Status: Completed Specimen: Bone from Back Updated: 01/25/23 2127     AFB Culture & Smear Culture in progress     AFB CULTURE STAIN No acid fast bacilli seen.    Narrative:      4) Perispinal    AFB Culture & Smear [[id number]] Collected: 01/24/23 0943    Order Status: Completed Specimen: Bone from Back Updated: 01/25/23 2127     AFB Culture & Smear Culture in progress     AFB CULTURE STAIN No acid fast bacilli seen.    Narrative:      3) Perispinal    AFB Culture & Smear [[id number]] Collected: 01/24/23 0913    Order Status: Completed Specimen: Body Fluid from Back Updated: 01/25/23 2127     AFB Culture & Smear Culture in progress     AFB CULTURE STAIN No acid fast bacilli seen.    Narrative:      1) Perispinal    AFB Culture & Smear [[id number]] Collected: 01/24/23 0913    Order Status: Completed Specimen: Body Fluid from Back Updated: 01/25/23 2127     AFB Culture & Smear Culture in progress     AFB CULTURE STAIN No acid fast bacilli seen.    Narrative:      2) Perispinal    Blood culture [[id number]] Collected: 01/20/23 1131    Order Status: Completed Specimen: Blood Updated: 01/25/23 1412     Blood Culture, Routine No growth after 5 days.    Narrative:      Collection has been rescheduled by [facility] at 01/20/2023 11:03 Reason:   Patient unavailable in room with doctors   Collection has been rescheduled by 2 at 01/20/2023 11:03 Reason:   Patient unavailable in room with doctors     Blood culture [[id number]] Collected: 01/20/23 1131    Order Status: Completed Specimen: Blood Updated: 01/25/23 1412     Blood Culture, Routine No growth after 5 days.    Narrative:      Collection has been rescheduled by [facility] at 01/20/2023 11:03 Reason:   Patient unavailable in room with doctors    Collection has been rescheduled by [facility] at 01/20/2023 11:03 Reason:   Patient unavailable in room with doctors     Culture, Anaerobe [[id number]] Collected: 01/24/23 0913    Order Status: Completed Specimen: Body Fluid from Back Updated: 01/25/23 0658     Anaerobic Culture Culture in progress    Narrative:      2) Perispinal    Gram stain [[id number]] Collected: 01/24/23 0913    Order Status: Completed Specimen: Body Fluid from Back Updated: 01/24/23 1239     Gram Stain Result No WBC's      No organisms seen    Narrative:      1) Perispinal    Gram stain [[id number]] Collected: 01/24/23 0944    Order Status: Completed Specimen: Bone from Back Updated: 01/24/23 1238     Gram Stain Result Rare WBC's      No organisms seen    Narrative:      3) Perispinal    Gram stain [[id number]] Collected: 01/24/23 0913    Order Status: Completed Specimen: Body Fluid from Back Updated: 01/24/23 1237     Gram Stain Result Rare WBC's      No organisms seen    Narrative:      2) Perispinal    Gram stain [[id number]] Collected: 01/24/23 0943    Order Status: Completed Specimen: Bone from Back Updated: 01/24/23 1236     Gram Stain Result No WBC's      No organisms seen    Narrative:      4) Perispinal    Fungus culture [[id number]] Collected: 01/24/23 0943    Order Status: Sent Specimen: Bone from Back Updated: 01/24/23 1009    Fungus culture [[id number]] Collected: 01/24/23 0943    Order Status: Sent Specimen: Bone from Back Updated: 01/24/23 1007    Fungus culture [[id number]] Collected: 01/24/23 0913    Order Status: Sent Specimen: Body Fluid from Back Updated: 01/24/23 0929    Fungus culture [[id number]] Collected: 01/24/23 0913    Order Status: Sent Specimen: Body Fluid from Back Updated: 01/24/23 0927    Urine culture [[id number]] Collected: 01/20/23 0130    Order Status: Completed Specimen: Urine Updated: 01/21/23 1144     Urine Culture, Routine Multiple organisms isolated. None in predominance.  Repeat if      clinically necessary.     Narrative:      Specimen Source->Urine          All pertinent labs from the last 24 hours have been reviewed.    Significant Diagnostics:  CT C-spine:  Vertebrae: The dens, lateral masses, and occipital condyles are intact.  There is no evidence of fracture or dislocation.     Discs: Multilevel disc height loss and degenerative endplate change.  Prominent C6 inferior endplate Schmorl's node, similar in appearance dating back to MRI from 02/14/2022.     Degenerative changes: Sent spinal canal dimensions at C3-C4 are 10.5 mm, 8.5 mm at C4-C5, 10.0 mm at C5-C6, and 10.8 mm at C6-C7 .  Multilevel uncovertebral spurring with multilevel neural foraminal narrowing most pronounced at C5-C6 with moderate right neural foraminal narrowing and C6-C7 with moderate left neural foraminal narrowing.    Physical Exam  Neurosurgery Physical Exam

## 2023-01-28 NOTE — ASSESSMENT & PLAN NOTE
41 yo F w/ history of multiple spinal surgeries presented to OSH with possible infection of patient's shoulder and found to have UTI and E. Coli bacteremia and progressively worse B/L LE weakness. Transferred to C for neurosurgical evaluation. Underwent Laminectomy T8-T10; Posterior spinal fusion T8-T12; hardware removal and washout. Admitted to [facility] for HLOC. Arrived to unit on PCA dilaudid pump. Patient is HDS on 3L NC.     -Neuro checks q2hr  -Vital signs q2hr  -NSGY following  -MAP goals >65  -SBP goals <180  -PRN labetalol, hydralazine  -Patient will need to lie flat with HOB 15-20  -Started on Vancomycin and meropenem, PICC consulted  -MM pain regimen; PCA Dilaudid pump for pain management  -Aggressive bowel regimen  -TSH, Lipid panel, and A1C pending  -PT/INR, aPTT   -CMP, Mag, Phos daily, CBC q12hr  -Transfuse for Hg<7  -PT/OT

## 2023-01-29 LAB
ALBUMIN SERPL BCP-MCNC: 1.8 G/DL (ref 3.5–5.2)
ALP SERPL-CCNC: 105 U/L (ref 55–135)
ALT SERPL W/O P-5'-P-CCNC: 31 U/L (ref 10–44)
ANION GAP SERPL CALC-SCNC: 7 MMOL/L (ref 8–16)
AST SERPL-CCNC: 53 U/L (ref 10–40)
BASOPHILS # BLD AUTO: 0.02 K/UL (ref 0–0.2)
BASOPHILS NFR BLD: 0.6 % (ref 0–1.9)
BILIRUB SERPL-MCNC: 1.5 MG/DL (ref 0.1–1)
BUN SERPL-MCNC: 15 MG/DL (ref 6–20)
CALCIUM SERPL-MCNC: 7.6 MG/DL (ref 8.7–10.5)
CHLORIDE SERPL-SCNC: 103 MMOL/L (ref 95–110)
CO2 SERPL-SCNC: 25 MMOL/L (ref 23–29)
CREAT SERPL-MCNC: 0.4 MG/DL (ref 0.5–1.4)
DIFFERENTIAL METHOD: ABNORMAL
EOSINOPHIL # BLD AUTO: 0.1 K/UL (ref 0–0.5)
EOSINOPHIL NFR BLD: 3.9 % (ref 0–8)
ERYTHROCYTE [DISTWIDTH] IN BLOOD BY AUTOMATED COUNT: 19.5 % (ref 11.5–14.5)
EST. GFR  (NO RACE VARIABLE): >60 ML/MIN/1.73 M^2
GLUCOSE SERPL-MCNC: 99 MG/DL (ref 70–110)
HCT VFR BLD AUTO: 24.3 % (ref 37–48.5)
HGB BLD-MCNC: 7.5 G/DL (ref 12–16)
IMM GRANULOCYTES # BLD AUTO: 0.06 K/UL (ref 0–0.04)
IMM GRANULOCYTES NFR BLD AUTO: 1.7 % (ref 0–0.5)
LYMPHOCYTES # BLD AUTO: 0.7 K/UL (ref 1–4.8)
LYMPHOCYTES NFR BLD: 18.7 % (ref 18–48)
MAGNESIUM SERPL-MCNC: 1.7 MG/DL (ref 1.6–2.6)
MCH RBC QN AUTO: 29.1 PG (ref 27–31)
MCHC RBC AUTO-ENTMCNC: 30.9 G/DL (ref 32–36)
MCV RBC AUTO: 94 FL (ref 82–98)
MONOCYTES # BLD AUTO: 0.3 K/UL (ref 0.3–1)
MONOCYTES NFR BLD: 9.5 % (ref 4–15)
NEUTROPHILS # BLD AUTO: 2.4 K/UL (ref 1.8–7.7)
NEUTROPHILS NFR BLD: 65.6 % (ref 38–73)
NRBC BLD-RTO: 1 /100 WBC
PHOSPHATE SERPL-MCNC: 3 MG/DL (ref 2.7–4.5)
PLATELET # BLD AUTO: 91 K/UL (ref 150–450)
PMV BLD AUTO: 10 FL (ref 9.2–12.9)
POCT GLUCOSE: 117 MG/DL (ref 70–110)
POTASSIUM SERPL-SCNC: 4.6 MMOL/L (ref 3.5–5.1)
PROT SERPL-MCNC: 4.7 G/DL (ref 6–8.4)
RBC # BLD AUTO: 2.58 M/UL (ref 4–5.4)
SODIUM SERPL-SCNC: 135 MMOL/L (ref 136–145)
WBC # BLD AUTO: 3.58 K/UL (ref 3.9–12.7)

## 2023-01-29 PROCEDURE — 94761 N-INVAS EAR/PLS OXIMETRY MLT: CPT

## 2023-01-29 PROCEDURE — 84100 ASSAY OF PHOSPHORUS: CPT | Performed by: STUDENT IN AN ORGANIZED HEALTH CARE EDUCATION/TRAINING PROGRAM

## 2023-01-29 PROCEDURE — 63600175 PHARM REV CODE 636 W HCPCS

## 2023-01-29 PROCEDURE — 20000000 HC ICU ROOM

## 2023-01-29 PROCEDURE — 63600175 PHARM REV CODE 636 W HCPCS: Performed by: STUDENT IN AN ORGANIZED HEALTH CARE EDUCATION/TRAINING PROGRAM

## 2023-01-29 PROCEDURE — 25000003 PHARM REV CODE 250

## 2023-01-29 PROCEDURE — 25000003 PHARM REV CODE 250: Performed by: PSYCHIATRY & NEUROLOGY

## 2023-01-29 PROCEDURE — 85025 COMPLETE CBC W/AUTO DIFF WBC: CPT

## 2023-01-29 PROCEDURE — 25000003 PHARM REV CODE 250: Performed by: INTERNAL MEDICINE

## 2023-01-29 PROCEDURE — 83735 ASSAY OF MAGNESIUM: CPT | Performed by: STUDENT IN AN ORGANIZED HEALTH CARE EDUCATION/TRAINING PROGRAM

## 2023-01-29 PROCEDURE — 27000207 HC ISOLATION

## 2023-01-29 PROCEDURE — 99233 PR SUBSEQUENT HOSPITAL CARE,LEVL III: ICD-10-PCS | Mod: ,,, | Performed by: PSYCHIATRY & NEUROLOGY

## 2023-01-29 PROCEDURE — 99233 PR SUBSEQUENT HOSPITAL CARE,LEVL III: ICD-10-PCS | Mod: ,,, | Performed by: INTERNAL MEDICINE

## 2023-01-29 PROCEDURE — 27000221 HC OXYGEN, UP TO 24 HOURS

## 2023-01-29 PROCEDURE — 99233 SBSQ HOSP IP/OBS HIGH 50: CPT | Mod: ,,, | Performed by: INTERNAL MEDICINE

## 2023-01-29 PROCEDURE — A4216 STERILE WATER/SALINE, 10 ML: HCPCS | Performed by: PSYCHIATRY & NEUROLOGY

## 2023-01-29 PROCEDURE — 80053 COMPREHEN METABOLIC PANEL: CPT | Performed by: STUDENT IN AN ORGANIZED HEALTH CARE EDUCATION/TRAINING PROGRAM

## 2023-01-29 PROCEDURE — 99233 SBSQ HOSP IP/OBS HIGH 50: CPT | Mod: ,,, | Performed by: PSYCHIATRY & NEUROLOGY

## 2023-01-29 PROCEDURE — 99900031 HC PATIENT EDUCATION (STAT)

## 2023-01-29 PROCEDURE — 25000003 PHARM REV CODE 250: Performed by: STUDENT IN AN ORGANIZED HEALTH CARE EDUCATION/TRAINING PROGRAM

## 2023-01-29 RX ADMIN — BUPROPION HYDROCHLORIDE 300 MG: 300 TABLET, FILM COATED, EXTENDED RELEASE ORAL at 08:01

## 2023-01-29 RX ADMIN — ONDANSETRON 4 MG: 2 INJECTION INTRAMUSCULAR; INTRAVENOUS at 06:01

## 2023-01-29 RX ADMIN — MEROPENEM 2 G: 1 INJECTION INTRAVENOUS at 01:01

## 2023-01-29 RX ADMIN — Medication 800 MG: at 03:01

## 2023-01-29 RX ADMIN — SENNOSIDES AND DOCUSATE SODIUM 2 TABLET: 50; 8.6 TABLET ORAL at 08:01

## 2023-01-29 RX ADMIN — PROCHLORPERAZINE EDISYLATE 2.5 MG: 5 INJECTION INTRAMUSCULAR; INTRAVENOUS at 10:01

## 2023-01-29 RX ADMIN — GABAPENTIN 600 MG: 300 CAPSULE ORAL at 08:01

## 2023-01-29 RX ADMIN — ACETAMINOPHEN 1000 MG: 500 TABLET ORAL at 08:01

## 2023-01-29 RX ADMIN — Medication: at 08:01

## 2023-01-29 RX ADMIN — OXYCODONE HYDROCHLORIDE 5 MG: 5 TABLET ORAL at 01:01

## 2023-01-29 RX ADMIN — GABAPENTIN 600 MG: 300 CAPSULE ORAL at 03:01

## 2023-01-29 RX ADMIN — LACTULOSE 20 G: 20 SOLUTION ORAL at 03:01

## 2023-01-29 RX ADMIN — Medication 10 ML: at 06:01

## 2023-01-29 RX ADMIN — ACETAMINOPHEN 1000 MG: 500 TABLET ORAL at 03:01

## 2023-01-29 RX ADMIN — ONDANSETRON 4 MG: 2 INJECTION INTRAMUSCULAR; INTRAVENOUS at 08:01

## 2023-01-29 RX ADMIN — OXYCODONE HYDROCHLORIDE 5 MG: 5 TABLET ORAL at 08:01

## 2023-01-29 RX ADMIN — MEROPENEM 2 G: 1 INJECTION INTRAVENOUS at 03:01

## 2023-01-29 RX ADMIN — METHOCARBAMOL 1000 MG: 500 TABLET ORAL at 08:01

## 2023-01-29 RX ADMIN — DIAZEPAM 5 MG: 5 TABLET ORAL at 08:01

## 2023-01-29 RX ADMIN — DIAZEPAM 5 MG: 5 TABLET ORAL at 03:01

## 2023-01-29 RX ADMIN — POLYETHYLENE GLYCOL 3350 17 G: 17 POWDER, FOR SOLUTION ORAL at 08:01

## 2023-01-29 RX ADMIN — Medication 10 ML: at 12:01

## 2023-01-29 RX ADMIN — PANTOPRAZOLE SODIUM 40 MG: 40 TABLET, DELAYED RELEASE ORAL at 08:01

## 2023-01-29 RX ADMIN — Medication: at 07:01

## 2023-01-29 RX ADMIN — MEROPENEM 2 G: 1 INJECTION INTRAVENOUS at 11:01

## 2023-01-29 RX ADMIN — LACTULOSE 20 G: 20 SOLUTION ORAL at 08:01

## 2023-01-29 RX ADMIN — Medication 6 MG: at 01:01

## 2023-01-29 RX ADMIN — Medication: at 01:01

## 2023-01-29 RX ADMIN — Medication 10 ML: at 01:01

## 2023-01-29 RX ADMIN — METHOCARBAMOL 1000 MG: 500 TABLET ORAL at 01:01

## 2023-01-29 RX ADMIN — METHOCARBAMOL 1000 MG: 500 TABLET ORAL at 04:01

## 2023-01-29 RX ADMIN — Medication 800 MG: at 08:01

## 2023-01-29 RX ADMIN — MEROPENEM 2 G: 1 INJECTION INTRAVENOUS at 08:01

## 2023-01-29 RX ADMIN — Medication: at 04:01

## 2023-01-29 NOTE — ASSESSMENT & PLAN NOTE
Thoracic discitis s/p laminectomy on 1/24. Operative cultures with E coli and ESBL E coli. Pending further surgical intervention on 2/1.   · Continue meropenem.   · Will follow up operative cultures.  · Will likely transition to ertapenem after her stage 2 intervention.

## 2023-01-29 NOTE — SUBJECTIVE & OBJECTIVE
Interval History: 1/29: neuro stable. Drains in place. OR Thursday pending    Medications:  Continuous Infusions:   hydromorphone in 0.9 % NaCl 6 mg/30 ml       Scheduled Meds:   acetaminophen  1,000 mg Oral TID    buPROPion  300 mg Oral Daily    gabapentin  600 mg Oral TID    lactulose  20 g Oral TID    meropenem (MERREM) IVPB  2 g Intravenous Q8H    methocarbamoL  1,000 mg Oral QID    pantoprazole  40 mg Oral Daily    polyethylene glycol  17 g Oral BID    senna-docusate 8.6-50 mg  2 tablet Oral BID    sodium chloride 0.9%  10 mL Intravenous Q6H     PRN Meds:sodium chloride, sodium chloride, dextrose 10%, dextrose 10%, diazePAM, glucagon (human recombinant), glucose, glucose, hydrALAZINE, labetalol, magnesium hydroxide 400 mg/5 ml, magnesium oxide, magnesium oxide, melatonin, naloxone, ondansetron, oxyCODONE, polyethylene glycol, potassium bicarbonate, potassium bicarbonate, potassium bicarbonate, potassium, sodium phosphates, potassium, sodium phosphates, potassium, sodium phosphates, prochlorperazine, sodium chloride 0.9%, Flushing PICC Protocol **AND** sodium chloride 0.9% **AND** sodium chloride 0.9%     Review of Systems  Objective:     Weight: 104 kg (229 lb 4.5 oz)  Body mass index is 35.91 kg/m².  Vital Signs (Most Recent):  Temp: 98.7 °F (37.1 °C) (01/29/23 0701)  Pulse: 103 (01/29/23 0828)  Resp: 18 (01/29/23 0858)  BP: (!) 107/55 (01/29/23 0701)  SpO2: 100 % (01/29/23 0828) Vital Signs (24h Range):  Temp:  [98.1 °F (36.7 °C)-98.7 °F (37.1 °C)] 98.7 °F (37.1 °C)  Pulse:  [102-118] 103  Resp:  [10-30] 18  SpO2:  [94 %-100 %] 100 %  BP: ()/(49-64) 107/55                              Neurosurgery Physical Exam  General: well developed, well nourished, no distress.   Head: normocephalic, atraumatic  Neurologic: Alert and oriented. Thought content appropriate.  GCS: Motor: 6/Verbal: 5/Eyes: 4 GCS Total: 15  Mental Status: Awake, Alert, Oriented x 4  Language: No aphasia  Speech: No dysarthria  Cranial  nerves: face symmetric, tongue midline, CN II-XII grossly intact.   Eyes: pupils equal, round, reactive to light with accommodation, EOMI, nystagmus.   Pulmonary: normal respirations, no signs of respiratory distress  Abdomen: soft, non-distended, not tender to palpation  Skin: Skin is warm, dry and intact.  Sensory: intact to light touch throughout     Motor Strength:Moves all extremities spontaneously with good tone.  Full strength upper and extremities. No abnormal movements seen.      Strength   Deltoids Triceps Biceps Wrist Extension Wrist Flexion Hand    Upper: R 5/5 5/5 5/5 5/5 5/5 5/5     L 5/5 5/5 5/5 5/5 5/5 5/5       Iliopsoas Quadriceps Knee  Flexion Tibialis  anterior Gastro- cnemius EHL   Lower: R 3/5 4+/5 4+/5 4/5 4-/5 4/5     L 3/5 4+/5 4+/5 4/5 4-/5 4/5      Reflexes:   DTR: 2+ symmetrically throughout.  Parker's: Negative.  Babinski's: Present bilaterally  Clonus: 2 beats bilateral lower extremity     Incision well healed       Significant Labs:  Recent Labs   Lab 01/27/23  1215 01/28/23  0425 01/29/23  0105   GLU  --  98 99   NA  --  132* 135*   K  --  4.1 4.6   CL  --  102 103   CO2  --  25 25   BUN  --  16 15   CREATININE  --  0.5 0.4*   CALCIUM  --  8.0* 7.6*   MG 1.6 1.6 1.7       Recent Labs   Lab 01/28/23  0832 01/28/23  2100 01/29/23  0830   WBC 5.22 5.03 3.58*   HGB 7.4* 7.4* 7.5*   HCT 23.3* 23.6* 24.3*   PLT 93* 99* 91*       No results for input(s): LABPT, INR, APTT in the last 48 hours.    Microbiology Results (last 7 days)       Procedure Component Value Units Date/Time    Blood culture [[id number]] Collected: 01/27/23 0120    Order Status: Completed Specimen: Blood from Peripheral, Forearm, Right Updated: 01/29/23 0613     Blood Culture, Routine No Growth to date      No Growth to date      No Growth to date    Blood culture [[id number]] Collected: 01/27/23 0123    Order Status: Completed Specimen: Blood from Peripheral, Antecubital, Left Updated: 01/29/23 0613     Blood Culture,  Routine No Growth to date      No Growth to date      No Growth to date    Aerobic culture [[id number]]  (Abnormal)  (Susceptibility) Collected: 01/24/23 0943    Order Status: Completed Specimen: Bone from Back Updated: 01/28/23 1145     Aerobic Bacterial Culture ESCHERICHIA COLI ESBL  From broth only      Narrative:      4) Perispinal    Aerobic culture [[id number]]  (Abnormal)  (Susceptibility) Collected: 01/24/23 0914    Order Status: Completed Specimen: Wound from Back Updated: 01/26/23 1306     Aerobic Bacterial Culture ESCHERICHIA COLI  Rare      Narrative:      1) Perispinal    Aerobic culture [[id number]]  (Abnormal)  (Susceptibility) Collected: 01/24/23 0914    Order Status: Completed Specimen: Wound from Back Updated: 01/26/23 1306     Aerobic Bacterial Culture ESCHERICHIA COLI ESBL  Few      Narrative:      2) Perispinal    Aerobic culture [[id number]]  (Abnormal)  (Susceptibility) Collected: 01/24/23 0943    Order Status: Completed Specimen: Bone from Back Updated: 01/26/23 1252     Aerobic Bacterial Culture ESCHERICHIA COLI ESBL  Few      Narrative:      3) Perispinal    Culture, Anaerobe [[id number]] Collected: 01/24/23 0943    Order Status: Completed Specimen: Bone from Back Updated: 01/26/23 1238     Anaerobic Culture Culture in progress    Narrative:      4) Perispinal    Culture, Anaerobe [[id number]] Collected: 01/24/23 0943    Order Status: Completed Specimen: Bone from Back Updated: 01/26/23 1237     Anaerobic Culture Culture in progress    Narrative:      3) Perispinal    Culture, Anaerobe [[id number]] Collected: 01/24/23 0913    Order Status: Completed Specimen: Body Fluid from Back Updated: 01/26/23 1237     Anaerobic Culture Culture in progress    Narrative:      1) Perispinal    AFB Culture & Smear [[id number]] Collected: 01/24/23 0943    Order Status: Completed Specimen: Bone from Back Updated: 01/25/23 2127     AFB Culture & Smear Culture in progress     AFB CULTURE STAIN No acid fast bacilli  seen.    Narrative:      4) Perispinal    AFB Culture & Smear [[id number]] Collected: 01/24/23 0943    Order Status: Completed Specimen: Bone from Back Updated: 01/25/23 2127     AFB Culture & Smear Culture in progress     AFB CULTURE STAIN No acid fast bacilli seen.    Narrative:      3) Perispinal    AFB Culture & Smear [[id number]] Collected: 01/24/23 0913    Order Status: Completed Specimen: Body Fluid from Back Updated: 01/25/23 2127     AFB Culture & Smear Culture in progress     AFB CULTURE STAIN No acid fast bacilli seen.    Narrative:      1) Perispinal    AFB Culture & Smear [[id number]] Collected: 01/24/23 0913    Order Status: Completed Specimen: Body Fluid from Back Updated: 01/25/23 2127     AFB Culture & Smear Culture in progress     AFB CULTURE STAIN No acid fast bacilli seen.    Narrative:      2) Perispinal    Blood culture [[id number]] Collected: 01/20/23 1131    Order Status: Completed Specimen: Blood Updated: 01/25/23 1412     Blood Culture, Routine No growth after 5 days.    Narrative:      Collection has been rescheduled by [facility] at 01/20/2023 11:03 Reason:   Patient unavailable in room with doctors   Collection has been rescheduled by [facility] at 01/20/2023 11:03 Reason:   Patient unavailable in room with doctors     Blood culture [[id number]] Collected: 01/20/23 1131    Order Status: Completed Specimen: Blood Updated: 01/25/23 1412     Blood Culture, Routine No growth after 5 days.    Narrative:      Collection has been rescheduled by [facility] at 01/20/2023 11:03 Reason:   Patient unavailable in room with doctors   Collection has been rescheduled by 2 at 01/20/2023 11:03 Reason:   Patient unavailable in room with doctors     Culture, Anaerobe [[id number]] Collected: 01/24/23 0913    Order Status: Completed Specimen: Body Fluid from Back Updated: 01/25/23 0658     Anaerobic Culture Culture in progress    Narrative:      2) Perispinal    Gram stain [[id number]] Collected: 01/24/23 0913    Order Status:  Completed Specimen: Body Fluid from Back Updated: 01/24/23 1239     Gram Stain Result No WBC's      No organisms seen    Narrative:      1) Perispinal    Gram stain [[id number]] Collected: 01/24/23 0944    Order Status: Completed Specimen: Bone from Back Updated: 01/24/23 1238     Gram Stain Result Rare WBC's      No organisms seen    Narrative:      3) Perispinal    Gram stain [[id number]] Collected: 01/24/23 0913    Order Status: Completed Specimen: Body Fluid from Back Updated: 01/24/23 1237     Gram Stain Result Rare WBC's      No organisms seen    Narrative:      2) Perispinal    Gram stain [[id number]] Collected: 01/24/23 0943    Order Status: Completed Specimen: Bone from Back Updated: 01/24/23 1236     Gram Stain Result No WBC's      No organisms seen    Narrative:      4) Perispinal    Fungus culture [[id number]] Collected: 01/24/23 0943    Order Status: Sent Specimen: Bone from Back Updated: 01/24/23 1009    Fungus culture [[id number]] Collected: 01/24/23 0943    Order Status: Sent Specimen: Bone from Back Updated: 01/24/23 1007    Fungus culture [[id number]] Collected: 01/24/23 0913    Order Status: Sent Specimen: Body Fluid from Back Updated: 01/24/23 0929    Fungus culture [[id number]] Collected: 01/24/23 0913    Order Status: Sent Specimen: Body Fluid from Back Updated: 01/24/23 0927          All pertinent labs from the last 24 hours have been reviewed.    Significant Diagnostics:  CT C-spine:  Vertebrae: The dens, lateral masses, and occipital condyles are intact.  There is no evidence of fracture or dislocation.     Discs: Multilevel disc height loss and degenerative endplate change.  Prominent C6 inferior endplate Schmorl's node, similar in appearance dating back to MRI from 02/14/2022.     Degenerative changes: Sent spinal canal dimensions at C3-C4 are 10.5 mm, 8.5 mm at C4-C5, 10.0 mm at C5-C6, and 10.8 mm at C6-C7 .  Multilevel uncovertebral spurring with multilevel neural foraminal narrowing most  pronounced at C5-C6 with moderate right neural foraminal narrowing and C6-C7 with moderate left neural foraminal narrowing.    Physical Exam  Neurosurgery Physical Exam

## 2023-01-29 NOTE — SUBJECTIVE & OBJECTIVE
Interval History:      Review of Systems   Constitutional:  Negative for fever.   HENT:  Negative for congestion and rhinorrhea.    Eyes:  Negative for visual disturbance.   Respiratory:  Negative for shortness of breath.    Cardiovascular:  Negative for chest pain.   Gastrointestinal:  Negative for abdominal distention, abdominal pain, nausea and vomiting.   Genitourinary:  Negative for dysuria and hematuria.   Musculoskeletal:  Positive for back pain. Negative for neck pain.   Neurological:  Positive for weakness. Negative for dizziness, seizures, light-headedness, numbness and headaches.     Objective:     Vitals:  Temp: 98.4 °F (36.9 °C)  Pulse: 110  Rhythm: sinus tachycardia  BP: (!) 101/59  MAP (mmHg): 75  Resp: 16  SpO2: 99 %    Temp  Min: 98.1 °F (36.7 °C)  Max: 98.6 °F (37 °C)  Pulse  Min: 106  Max: 117  BP  Min: 98/60  Max: 129/58  MAP (mmHg)  Min: 72  Max: 84  Resp  Min: 10  Max: 30  SpO2  Min: 98 %  Max: 100 %    01/27 0701 - 01/28 0700  In: 1737.2 [P.O.:1050; I.V.:40]  Out: 965 [Urine:270; Drains:695]   Unmeasured Output  Urine Occurrence: 1  Stool Occurrence: 1  Pad Count: 1       Physical Exam  Vitals and nursing note reviewed.   Constitutional:       General: She is not in acute distress.     Appearance: She is obese. She is not ill-appearing, toxic-appearing or diaphoretic.   HENT:      Head: Normocephalic.      Mouth/Throat:      Mouth: Mucous membranes are moist.   Eyes:      General: No scleral icterus.        Right eye: No discharge.         Left eye: No discharge.   Cardiovascular:      Rate and Rhythm: Normal rate and regular rhythm.   Pulmonary:      Effort: Pulmonary effort is normal. No respiratory distress.      Comments: NC in place  Abdominal:      General: Abdomen is flat.      Palpations: Abdomen is soft.      Tenderness: There is no abdominal tenderness.   Musculoskeletal:         General: No deformity.      Cervical back: No rigidity.   Skin:     General: Skin is warm and dry.       Coloration: Skin is not jaundiced.   Neurological:      Mental Status: She is alert and oriented to person, place, and time. Mental status is at baseline.      Motor: Weakness (Bilateral LE) and tremor (Bilateral UE) present.      Comments:   E4 V5 M6  Awake, alert, and oriented to self, time, place, and situation.   Answering all questions appropriately and following all commands briskly.   No facial droop. EOMI. PERRL. Peripheral vision intact bilaterally.   FOWLER spontaneously and follows commands  RUE: 3/5  LUE: 3/5  RLE: 2/5  LLE: 2/5  Sensation intact     Psychiatric:         Mood and Affect: Mood normal.         Behavior: Behavior normal.     Gait & coordination exams.     Medications:  Continuoushydromorphone in 0.9 % NaCl 6 mg/30 ml    Scheduledacetaminophen, 1,000 mg, TID  buPROPion, 300 mg, Daily  gabapentin, 600 mg, TID  lactulose, 20 g, TID  meropenem (MERREM) IVPB, 2 g, Q8H  methocarbamoL, 1,000 mg, QID  pantoprazole, 40 mg, Daily  polyethylene glycol, 17 g, BID  senna-docusate 8.6-50 mg, 2 tablet, BID  sodium chloride 0.9%, 10 mL, Q6H    PRNsodium chloride, , Q24H PRN  sodium chloride, , Q24H PRN  dextrose 10%, 12.5 g, PRN  dextrose 10%, 25 g, PRN  diazePAM, 5 mg, Q6H PRN  glucagon (human recombinant), 1 mg, PRN  glucose, 16 g, PRN  glucose, 24 g, PRN  hydrALAZINE, 10 mg, Q6H PRN  labetalol, 10 mg, Q4H PRN  magnesium hydroxide 400 mg/5 ml, 30 mL, Daily PRN  magnesium oxide, 800 mg, PRN  magnesium oxide, 800 mg, PRN  melatonin, 6 mg, Nightly PRN  naloxone, 0.02 mg, PRN  ondansetron, 4 mg, Q6H PRN  oxyCODONE, 5 mg, Q4H PRN  polyethylene glycol, 17 g, Daily PRN  potassium bicarbonate, 35 mEq, PRN  potassium bicarbonate, 50 mEq, PRN  potassium bicarbonate, 60 mEq, PRN  potassium, sodium phosphates, 2 packet, PRN  potassium, sodium phosphates, 2 packet, PRN  potassium, sodium phosphates, 2 packet, PRN  prochlorperazine, 2.5 mg, Q6H PRN  sodium chloride 0.9%, 10 mL, Q12H PRN  sodium chloride 0.9%, 10 mL,  PRN      Today I personally reviewed pertinent medications, lines/drains/airways, laboratory results, microbiology results, notably:    Laboratory:  CBC:  Recent Labs   Lab 01/28/23  0832   WBC 5.22   RBC 2.56*   HGB 7.4*   HCT 23.3*   PLT 93*   MCV 91   MCH 28.9   MCHC 31.8*       CMP:  Recent Labs   Lab 01/28/23  0425   CALCIUM 8.0*   PROT 5.1*   *   K 4.1   CO2 25      BUN 16   CREATININE 0.5   ALKPHOS 96   ALT 29   AST 54*   BILITOT 2.3*     Microbiology:  Microbiology Results (last 7 days)       Procedure Component Value Units Date/Time    Aerobic culture [[id number]]  (Abnormal)  (Susceptibility) Collected: 01/24/23 0943    Order Status: Completed Specimen: Bone from Back Updated: 01/28/23 1145     Aerobic Bacterial Culture ESCHERICHIA COLI ESBL  From broth only      Narrative:      4) Perispinal    Blood culture [[id number]] Collected: 01/27/23 0120    Order Status: Completed Specimen: Blood from Peripheral, Forearm, Right Updated: 01/28/23 0613     Blood Culture, Routine No Growth to date      No Growth to date    Blood culture [[id number]] Collected: 01/27/23 0123    Order Status: Completed Specimen: Blood from Peripheral, Antecubital, Left Updated: 01/28/23 0613     Blood Culture, Routine No Growth to date      No Growth to date    Aerobic culture [[id number]]  (Abnormal)  (Susceptibility) Collected: 01/24/23 0914    Order Status: Completed Specimen: Wound from Back Updated: 01/26/23 1306     Aerobic Bacterial Culture ESCHERICHIA COLI  Rare      Narrative:      1) Perispinal    Aerobic culture [[id number]]  (Abnormal)  (Susceptibility) Collected: 01/24/23 0914    Order Status: Completed Specimen: Wound from Back Updated: 01/26/23 1306     Aerobic Bacterial Culture ESCHERICHIA COLI ESBL  Few      Narrative:      2) Perispinal    Aerobic culture [[id number]]  (Abnormal)  (Susceptibility) Collected: 01/24/23 0943    Order Status: Completed Specimen: Bone from Back Updated: 01/26/23 1252     Aerobic  Bacterial Culture ESCHERICHIA COLI ESBL  Few      Narrative:      3) Perispinal    Culture, Anaerobe [[id number]] Collected: 01/24/23 0943    Order Status: Completed Specimen: Bone from Back Updated: 01/26/23 1238     Anaerobic Culture Culture in progress    Narrative:      4) Perispinal    Culture, Anaerobe [[id number]] Collected: 01/24/23 0943    Order Status: Completed Specimen: Bone from Back Updated: 01/26/23 1237     Anaerobic Culture Culture in progress    Narrative:      3) Perispinal    Culture, Anaerobe [[id number]] Collected: 01/24/23 0913    Order Status: Completed Specimen: Body Fluid from Back Updated: 01/26/23 1237     Anaerobic Culture Culture in progress    Narrative:      1) Perispinal    AFB Culture & Smear [[id number]] Collected: 01/24/23 0943    Order Status: Completed Specimen: Bone from Back Updated: 01/25/23 2127     AFB Culture & Smear Culture in progress     AFB CULTURE STAIN No acid fast bacilli seen.    Narrative:      4) Perispinal    AFB Culture & Smear [[id number]] Collected: 01/24/23 0943    Order Status: Completed Specimen: Bone from Back Updated: 01/25/23 2127     AFB Culture & Smear Culture in progress     AFB CULTURE STAIN No acid fast bacilli seen.    Narrative:      3) Perispinal    AFB Culture & Smear [[id number]] Collected: 01/24/23 0913    Order Status: Completed Specimen: Body Fluid from Back Updated: 01/25/23 2127     AFB Culture & Smear Culture in progress     AFB CULTURE STAIN No acid fast bacilli seen.    Narrative:      1) Perispinal    AFB Culture & Smear [[id number]] Collected: 01/24/23 0913    Order Status: Completed Specimen: Body Fluid from Back Updated: 01/25/23 2127     AFB Culture & Smear Culture in progress     AFB CULTURE STAIN No acid fast bacilli seen.    Narrative:      2) Perispinal    Blood culture [[id number]] Collected: 01/20/23 1131    Order Status: Completed Specimen: Blood Updated: 01/25/23 1412     Blood Culture, Routine No growth after 5 days.     Narrative:      Collection has been rescheduled by [facility] at 01/20/2023 11:03 Reason:   Patient unavailable in room with doctors   Collection has been rescheduled by [facility] at 01/20/2023 11:03 Reason:   Patient unavailable in room with doctors     Blood culture [[id number]] Collected: 01/20/23 1131    Order Status: Completed Specimen: Blood Updated: 01/25/23 1412     Blood Culture, Routine No growth after 5 days.    Narrative:      Collection has been rescheduled by [facility] at 01/20/2023 11:03 Reason:   Patient unavailable in room with doctors   Collection has been rescheduled by [facility] at 01/20/2023 11:03 Reason:   Patient unavailable in room with doctors     Culture, Anaerobe [[id number]] Collected: 01/24/23 0913    Order Status: Completed Specimen: Body Fluid from Back Updated: 01/25/23 0658     Anaerobic Culture Culture in progress    Narrative:      2) Perispinal    Gram stain [[id number]] Collected: 01/24/23 0913    Order Status: Completed Specimen: Body Fluid from Back Updated: 01/24/23 1239     Gram Stain Result No WBC's      No organisms seen    Narrative:      1) Perispinal    Gram stain [[id number]] Collected: 01/24/23 0944    Order Status: Completed Specimen: Bone from Back Updated: 01/24/23 1238     Gram Stain Result Rare WBC's      No organisms seen    Narrative:      3) Perispinal    Gram stain [[id number]] Collected: 01/24/23 0913    Order Status: Completed Specimen: Body Fluid from Back Updated: 01/24/23 1237     Gram Stain Result Rare WBC's      No organisms seen    Narrative:      2) Perispinal    Gram stain [[id number]] Collected: 01/24/23 0943    Order Status: Completed Specimen: Bone from Back Updated: 01/24/23 1236     Gram Stain Result No WBC's      No organisms seen    Narrative:      4) Perispinal    Fungus culture [[id number]] Collected: 01/24/23 0943    Order Status: Sent Specimen: Bone from Back Updated: 01/24/23 1009    Fungus culture [[id number]] Collected: 01/24/23 0943    Order Status: Sent Specimen:  Bone from Back Updated: 01/24/23 1007    Fungus culture [[id number]] Collected: 01/24/23 0913    Order Status: Sent Specimen: Body Fluid from Back Updated: 01/24/23 0929    Fungus culture [[id number]] Collected: 01/24/23 0913    Order Status: Sent Specimen: Body Fluid from Back Updated: 01/24/23 0927               Diet  Diet Adult Regular (IDDSI Level 7)  Diet Adult Regular (IDDSI Level 7)

## 2023-01-29 NOTE — PROGRESS NOTES
[facility] - Neuro Critical Care  Neurocritical Care  Progress Note    Admit Date: 1/19/2023  Service Date: 01/29/2023  Length of Stay: 10    Subjective:     Chief Complaint: Weakness of both lower extremities    History of Present Illness: 42-year-old female with a history of IV drug use, chronic hep C with cirrhosis, spinal fusion in April 2021, complicated by epidural abscess with removal of hardware February 2022 and against spinal fusion in March 2022 of T10 through the pelvis as well as neurogenic bladder who initially presented to [facility] in late December for generalized weakness, fatigue and bilateral flank pain.  Her course at [facility] was complicated by a E coli bacteremia thought to be due to a urinary source, she is completed a 7 day course of meropenem on 1/3, and had bcx from 1/2 that did not grow any bacteria.       She also had an ICU admission for ongoing back spasms and agitation requiring Precedex.  In early January patient started developing progressive lower extremity weakness greater in the left compared to the right.  She also had an MRI at the outside hospital that showed some white matter changes concerning for possible demyelinating disease.  However her thoracic spine radiographs also showed worsening of her kyphosis at T9-T10.  Due to the concern for either a demyelinating polyneuropathy, MS, or spinal cord compression the patient was transferred to [facility] for neurosurgical evaluation.       On admission the patient had a CT lumbar and thoracic spine and an MRI CTL spine. The MRI showed  focal kyphosis T8 through T10, with possible associated cord edema signal.  As well as extensive edema throughout the posterior paraspinal musculature concerning for possible infectious or inflammatory process.  There was concern that the findings at T8 through T10 could correspond with compressive myelopathy.  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  There were also erosive changes involving the sacroiliac joints bilaterally which was concerning for sacroiliitis and could be infectious.     Patient underwent laminectomy T8-T10; posterior fusion T8-T12; and washout by NSGY today. No complications noted during surgery. Patient is HDS and on 3L NC. Patient is currently on a PCA dilaudid pump.            Hospital Course: 1/25/2023 NAEO, pain well controlled on current regimen. Continue ABX. Maintain logroll precautions and HOB <30 until second stage of surgery.  01/26/2023 Hemoglobin 6.6, repeat 6.2. Increased drainage noted from bilateral hemovac. S/p 1u pRBC. PICC team consulted for long term access for antibiotics, remove IJ central line when PICC placed.   01/27/2023: NAEON. HV to gravity with decreased drainge. Hgb 7.2 this AM, will trend CBC q12h. Lytes replaced. Bowel regimen adjusted.   01/28/2023: NAEON. Received 1 unit PBRC yesterday. Hgb 7.4 this AM. Plan for OR 2/2.   01/29/2023: NAEON. HV drainage decreasing. Hgb stable. Plan for OR 2/2      Interval History:  See hospital course above.     Review of Systems   Constitutional:  Negative for fever.   HENT:  Negative for congestion and rhinorrhea.    Eyes:  Negative for visual disturbance.   Respiratory:  Negative for shortness of breath.    Cardiovascular:  Negative for chest pain.   Gastrointestinal:  Negative for abdominal distention, abdominal pain, nausea and vomiting.   Genitourinary:  Negative for dysuria and hematuria.   Musculoskeletal:  Positive for back pain. Negative for neck pain.   Neurological:  Positive for weakness. Negative for dizziness, seizures, light-headedness, numbness and headaches.     Objective:     Vitals:  Temp: 98.5 °F (36.9 °C)  Pulse: 105  Rhythm: sinus tachycardia  BP: (!) 105/50  MAP (mmHg): 72  Resp: 18  SpO2: 100 %    Temp  Min: 98.1 °F (36.7 °C)  Max: 98.7 °F (37.1 °C)  Pulse  Min: 102  Max:  118  BP  Min: 87/49  Max: 118/60  MAP (mmHg)  Min: 65  Max: 84  Resp  Min: 10  Max: 30  SpO2  Min: 94 %  Max: 100 %    01/28 0701 - 01/29 0700  In: 767.6 [P.O.:360]  Out: 1880 [Urine:1500; Drains:380]   Unmeasured Output  Urine Occurrence: 1  Stool Occurrence: 1  Pad Count: 2       Physical Exam  Vitals and nursing note reviewed.   Constitutional:       General: She is not in acute distress.     Appearance: She is obese. She is not ill-appearing, toxic-appearing or diaphoretic.   HENT:      Head: Normocephalic.      Mouth/Throat:      Mouth: Mucous membranes are moist.   Eyes:      General: No scleral icterus.        Right eye: No discharge.         Left eye: No discharge.   Cardiovascular:      Rate and Rhythm: Normal rate and regular rhythm.   Pulmonary:      Effort: Pulmonary effort is normal. No respiratory distress.      Comments: NC in place  Abdominal:      General: Abdomen is flat.      Palpations: Abdomen is soft.      Tenderness: There is no abdominal tenderness.   Musculoskeletal:         General: No deformity.      Cervical back: No rigidity.   Skin:     General: Skin is warm and dry.      Coloration: Skin is not jaundiced.   Neurological:      Mental Status: She is alert and oriented to person, place, and time. Mental status is at baseline.      Motor: Weakness (Bilateral LE) and tremor (Bilateral UE) present.      Comments:   E4 V5 M6  Awake, alert, and oriented to self, time, place, and situation. Speech fluent. Answering all questions appropriately and following all commands briskly.   PERRL. Extraocular movements grossly intact. No facial asymmetry. Conversational hearing intact.   FOWLER spontaneously and follows commands. Strength 4/5 in BUE & 3/5 in BLE. Sensation intact to light touch in all 4 extremities.    Psychiatric:         Mood and Affect: Mood normal.         Behavior: Behavior normal.     Gait & coordination exams deferred.      Medications:  Continuoushydromorphone in 0.9 % NaCl 6 mg/30  ml    Scheduledacetaminophen, 1,000 mg, TID  buPROPion, 300 mg, Daily  gabapentin, 600 mg, TID  lactulose, 20 g, TID  meropenem (MERREM) IVPB, 2 g, Q8H  methocarbamoL, 1,000 mg, QID  pantoprazole, 40 mg, Daily  polyethylene glycol, 17 g, BID  senna-docusate 8.6-50 mg, 2 tablet, BID  sodium chloride 0.9%, 10 mL, Q6H    PRNsodium chloride, , Q24H PRN  sodium chloride, , Q24H PRN  dextrose 10%, 12.5 g, PRN  dextrose 10%, 25 g, PRN  diazePAM, 5 mg, Q6H PRN  glucagon (human recombinant), 1 mg, PRN  glucose, 16 g, PRN  glucose, 24 g, PRN  hydrALAZINE, 10 mg, Q6H PRN  labetalol, 10 mg, Q4H PRN  magnesium hydroxide 400 mg/5 ml, 30 mL, Daily PRN  magnesium oxide, 800 mg, PRN  magnesium oxide, 800 mg, PRN  melatonin, 6 mg, Nightly PRN  naloxone, 0.02 mg, PRN  ondansetron, 4 mg, Q6H PRN  oxyCODONE, 5 mg, Q4H PRN  polyethylene glycol, 17 g, Daily PRN  potassium bicarbonate, 35 mEq, PRN  potassium bicarbonate, 50 mEq, PRN  potassium bicarbonate, 60 mEq, PRN  potassium, sodium phosphates, 2 packet, PRN  potassium, sodium phosphates, 2 packet, PRN  potassium, sodium phosphates, 2 packet, PRN  prochlorperazine, 2.5 mg, Q6H PRN  sodium chloride 0.9%, 10 mL, Q12H PRN  sodium chloride 0.9%, 10 mL, PRN      Today I personally reviewed pertinent medications, lines/drains/airways, laboratory results, microbiology results, notably:      Laboratory:  CBC:  Recent Labs   Lab 01/29/23  0830   WBC 3.58*   RBC 2.58*   HGB 7.5*   HCT 24.3*   PLT 91*   MCV 94   MCH 29.1   MCHC 30.9*       CMP:  Recent Labs   Lab 01/29/23  0105   CALCIUM 7.6*   PROT 4.7*   *   K 4.6   CO2 25      BUN 15   CREATININE 0.4*   ALKPHOS 105   ALT 31   AST 53*   BILITOT 1.5*     Microbiology:  Microbiology Results (last 7 days)       Procedure Component Value Units Date/Time    Blood culture [[id number]] Collected: 01/27/23 0120    Order Status: Completed Specimen: Blood from Peripheral, Forearm, Right Updated: 01/29/23 0613     Blood Culture, Routine No  Growth to date      No Growth to date      No Growth to date    Blood culture [[id number]] Collected: 01/27/23 0123    Order Status: Completed Specimen: Blood from Peripheral, Antecubital, Left Updated: 01/29/23 0613     Blood Culture, Routine No Growth to date      No Growth to date      No Growth to date    Aerobic culture [[id number]]  (Abnormal)  (Susceptibility) Collected: 01/24/23 0943    Order Status: Completed Specimen: Bone from Back Updated: 01/28/23 1145     Aerobic Bacterial Culture ESCHERICHIA COLI ESBL  From broth only      Narrative:      4) Perispinal    Aerobic culture [[id number]]  (Abnormal)  (Susceptibility) Collected: 01/24/23 0914    Order Status: Completed Specimen: Wound from Back Updated: 01/26/23 1306     Aerobic Bacterial Culture ESCHERICHIA COLI  Rare      Narrative:      1) Perispinal    Aerobic culture [[id number]]  (Abnormal)  (Susceptibility) Collected: 01/24/23 0914    Order Status: Completed Specimen: Wound from Back Updated: 01/26/23 1306     Aerobic Bacterial Culture ESCHERICHIA COLI ESBL  Few      Narrative:      2) Perispinal    Aerobic culture [[id number]]  (Abnormal)  (Susceptibility) Collected: 01/24/23 0943    Order Status: Completed Specimen: Bone from Back Updated: 01/26/23 1252     Aerobic Bacterial Culture ESCHERICHIA COLI ESBL  Few      Narrative:      3) Perispinal    Culture, Anaerobe [[id number]] Collected: 01/24/23 0943    Order Status: Completed Specimen: Bone from Back Updated: 01/26/23 1238     Anaerobic Culture Culture in progress    Narrative:      4) Perispinal    Culture, Anaerobe [[id number]] Collected: 01/24/23 0943    Order Status: Completed Specimen: Bone from Back Updated: 01/26/23 1237     Anaerobic Culture Culture in progress    Narrative:      3) Perispinal    Culture, Anaerobe [[id number]] Collected: 01/24/23 0913    Order Status: Completed Specimen: Body Fluid from Back Updated: 01/26/23 1237     Anaerobic Culture Culture in progress    Narrative:      1)  Perispinal    AFB Culture & Smear [[id number]] Collected: 01/24/23 0943    Order Status: Completed Specimen: Bone from Back Updated: 01/25/23 2127     AFB Culture & Smear Culture in progress     AFB CULTURE STAIN No acid fast bacilli seen.    Narrative:      4) Perispinal    AFB Culture & Smear [[id number]] Collected: 01/24/23 0943    Order Status: Completed Specimen: Bone from Back Updated: 01/25/23 2127     AFB Culture & Smear Culture in progress     AFB CULTURE STAIN No acid fast bacilli seen.    Narrative:      3) Perispinal    AFB Culture & Smear [[id number]] Collected: 01/24/23 0913    Order Status: Completed Specimen: Body Fluid from Back Updated: 01/25/23 2127     AFB Culture & Smear Culture in progress     AFB CULTURE STAIN No acid fast bacilli seen.    Narrative:      1) Perispinal    AFB Culture & Smear [[id number]] Collected: 01/24/23 0913    Order Status: Completed Specimen: Body Fluid from Back Updated: 01/25/23 2127     AFB Culture & Smear Culture in progress     AFB CULTURE STAIN No acid fast bacilli seen.    Narrative:      2) Perispinal    Blood culture [[id number]] Collected: 01/20/23 1131    Order Status: Completed Specimen: Blood Updated: 01/25/23 1412     Blood Culture, Routine No growth after 5 days.    Narrative:      Collection has been rescheduled by [facility] at 01/20/2023 11:03 Reason:   Patient unavailable in room with doctors   Collection has been rescheduled by 2 at 01/20/2023 11:03 Reason:   Patient unavailable in room with doctors     Blood culture [[id number]] Collected: 01/20/23 1131    Order Status: Completed Specimen: Blood Updated: 01/25/23 1412     Blood Culture, Routine No growth after 5 days.    Narrative:      Collection has been rescheduled by 2 at 01/20/2023 11:03 Reason:   Patient unavailable in room with doctors   Collection has been rescheduled by 2 at 01/20/2023 11:03 Reason:   Patient unavailable in room with doctors     Culture, Anaerobe [[id number]] Collected: 01/24/23  0913    Order Status: Completed Specimen: Body Fluid from Back Updated: 01/25/23 0658     Anaerobic Culture Culture in progress    Narrative:      2) Perispinal    Gram stain [[id number]] Collected: 01/24/23 0913    Order Status: Completed Specimen: Body Fluid from Back Updated: 01/24/23 1239     Gram Stain Result No WBC's      No organisms seen    Narrative:      1) Perispinal    Gram stain [[id number]] Collected: 01/24/23 0944    Order Status: Completed Specimen: Bone from Back Updated: 01/24/23 1238     Gram Stain Result Rare WBC's      No organisms seen    Narrative:      3) Perispinal    Gram stain [[id number]] Collected: 01/24/23 0913    Order Status: Completed Specimen: Body Fluid from Back Updated: 01/24/23 1237     Gram Stain Result Rare WBC's      No organisms seen    Narrative:      2) Perispinal    Gram stain [[id number]] Collected: 01/24/23 0943    Order Status: Completed Specimen: Bone from Back Updated: 01/24/23 1236     Gram Stain Result No WBC's      No organisms seen    Narrative:      4) Perispinal    Fungus culture [[id number]] Collected: 01/24/23 0943    Order Status: Sent Specimen: Bone from Back Updated: 01/24/23 1009    Fungus culture [[id number]] Collected: 01/24/23 0943    Order Status: Sent Specimen: Bone from Back Updated: 01/24/23 1007    Fungus culture [[id number]] Collected: 01/24/23 0913    Order Status: Sent Specimen: Body Fluid from Back Updated: 01/24/23 0929    Fungus culture [[id number]] Collected: 01/24/23 0913    Order Status: Sent Specimen: Body Fluid from Back Updated: 01/24/23 0927              Diet  Diet Adult Regular (IDDSI Level 7)  Diet Adult Regular (IDDSI Level 7)        Assessment/Plan:     Psychiatric  Anxiety and depression  Continue Buproprion for depression  Continue Lorazepam for anxiety  Continue Gabapentin for neuropathy and anxiety    Substance use disorder  Denies IV drug use (meth) for past 3 years.  Denies alcohol and tobacco abuse.       ID  Thoracic discitis  CT  T/L spine thin cut 1/20 with haloing of pelvic and T10 screws, spondylodiscitis T8-9, T9-10  CT C spine with concern for discitis at C6-7  Patient is currently afebrile with chronic pancytopenia  Previously on Amoxicillin up until surgery for chronic suppressive therapy.  ID following  Blood cx (1/29) NGTD  Intra-operative cultures with E.coli ESBL  Continue Vancomycin and meropenem  Trend CBC daily    GI  Chronic hepatitis C with cirrhosis  Hx of Hep C. See Cirrhosis of Liver    Cirrhosis of liver with ascites  Patient has reportedly refused transplant evaluation in the past.   -Daily CMP and trend LFTs  -Continue Lactulose 20g TID   -Will need GI/hepatology follow up outpatient    MELD-Na score: 18 at 1/26/2023 11:42 PM  MELD score: 13 at 1/26/2023 11:42 PM  Calculated from:  Serum Creatinine: 0.5 mg/dL (Using min of 1 mg/dL) at 1/26/2023 11:42 PM  Serum Sodium: 131 mmol/L at 1/26/2023 11:42 PM  Total Bilirubin: 1.9 mg/dL at 1/26/2023 11:42 PM  INR(ratio): 1.4 at 1/25/2023  1:08 AM  Age: 42 years    Orthopedic  * Weakness of both lower extremities  43 yo F w/ history of multiple spinal surgeries presented to OSH with possible infection of patient's shoulder and found to have UTI and E. Coli bacteremia and progressively worse B/L LE weakness. [place] [facility] [place] Underwent Laminectomy T8-T10; Posterior spinal fusion T8-T12; hardware removal and washout. Admitted to [facility] for HLOC. Arrived to unit on PCA dilaudid pump. Patient is HDS on 3L NC.   - Admit to NSCCU  - NSGY, ID following  - q2hr neuro checks, vitals, I/Os  - HV drains x 2 in place, management per NSGY  -Transfuse for Hg<7  - Cultures positive for ESBL, blood cultures with NGTD. WCTM  - Antibiotics per ID recs  - CMP, Mag, Phos, CBC daily  - MAP goals > 65, SBP < 180  - PRN labetalol, hydralazine  - MM pain regimen; PCA Dilaudid pump for pain management  - Patient will need to lie flat with HOB 15-20°   - Aggressive bowel  regimen  - PT/OT as appropriate   - VTE prophylaxis: mechanical, hold chemical given pancytopenia          The patient is being Prophylaxed for:  Venous Thromboembolism with: Mechanical  Stress Ulcer with: PPI  Ventilator Pneumonia with: not applicable    Activity Orders          Turn patient starting at 01/24 1800    Up in chair With meals starting at 01/24 1700    Diet Adult Regular (IDDSI Level 7): Regular starting at 01/24 1106    Progressive Mobility Protocol (mobilize patient to their highest level of functioning at least twice daily) starting at 01/24 1105    Elevate HOB 30 starting at 01/24 1105    Ambulate With Assistance, Post Op Day 0 If the patient arrives from PACU by 4PM, walk at least once on day of operation if alert and safe to do so. starting at 01/24 1104        Full Code    Vaishnavi Adames PANohemiC  Neurocritical Care  [facility] - Neuro Critical Care

## 2023-01-29 NOTE — ASSESSMENT & PLAN NOTE
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 Transferred to [facility] [place] to  and neurosurgery was consulted    OR yesterday for temporary T6-12 PSIF. Taz pus noted intraoperatively.   Given intraoperative findings, new operative plan for T4-pelvis revision and extension of fusion with T9-10 corpectomy with plastic surgery assistance planned for 2/2/23    Plan:  Admitted to ICU, Keep in ICU on logroll precautions until stage 2 Thursday.    -- MRI C/T/L spine 1/20 with C7 SP enhancement, focal kyphosis at T8-10 with severe anterior height loss at T9, enhancing C6 inferior endplate Schmorl's node  -- CT T/L spine thin cut 1/20 with haloing of pelvic and T10 screws, spondylodiscitis T8-9, T9-10  -- CT C spine with concern for discitis at C6-7  -- flex ext XR done, limited view of C6-7 in swimmers view 2/2 shoulders  -- 1/20 ESR 70, CRP 9.4. elevated from prior   -- TLSO at bedside  --HV x2 to gravity, WV in place, monitor ouput  --ID: Vanc/merropenam   -- BCx 1/20: NGTD   -- OR cultures 1/24: E. coli  --OR 2/2/23 as above   -- will obtain informed consent   -- NPO 2/1/23 midnight  -- multimodal pain control per primary medicine team  NSGY will continue to follow. Please contact team with any further questions.

## 2023-01-29 NOTE — ASSESSMENT & PLAN NOTE
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  ID following  Blood cx (1/20) NGTD  Intra-operative cultures with GNRs  Continue Vancomycin and meropenem  Trend CBC daily

## 2023-01-29 NOTE — ASSESSMENT & PLAN NOTE
41 yo F w/ history of multiple spinal surgeries presented to OSH with possible infection of patient's shoulder and found to have UTI and E. Coli bacteremia and progressively worse B/L LE weakness. Transferred to [facility] for neurosurgical evaluation. Underwent Laminectomy T8-T10; Posterior spinal fusion T8-T12; hardware removal and washout. Admitted to [facility] for HLOC. Arrived to unit on PCA dilaudid pump. Patient is HDS on 3L NC.     -Neuro checks q2hr  -Vital signs q2hr  -NSGY following  -MAP goals >65  -SBP goals <180  -PRN labetalol, hydralazine  -Patient will need to lie flat with HOB 15-20  -Started on Vancomycin and meropenem, PICC consulted  -MM pain regimen; PCA Dilaudid pump for pain management  -Aggressive bowel regimen  -TSH, Lipid panel, and A1C pending  -PT/INR, aPTT   -CMP, Mag, Phos, CBC daily  -Transfuse for Hg<7  -PT/OT

## 2023-01-29 NOTE — PROGRESS NOTES
[facility] Critical [facility]  Infectious Disease  Progress Note    Patient Name: Rachel Zazueta  MRN: [id number]  Admission Date: 1/19/2023  Length of Stay: 9 days  Attending Physician: Eber Montoya DO  Primary Care Provider: Dannielle Merino DO    Isolation Status: Contact  Assessment/Plan:      Thoracic discitis  Thoracic discitis s/p laminectomy on 1/24. Operative cultures with E coli and ESBL E coli. Pending further surgical intervention on 2/1.   · Continue meropenem.   · Will follow up operative cultures.  · Will likely transition to ertapenem after her stage 2 intervention.           Anticipated Disposition: TBD    Thank you for your consult. I will follow-up with patient. Please contact us if you have any additional questions.    Efren Bellamy MD  Infectious Disease  [facility]    Subjective:     Principal Problem:Weakness of both lower extremities    HPI: Ms. Zazueta is a 42-year-old woman known to ID with hx of IVDU, IV drug use (methamphetamine), chronic HCV with cirrhosis, chronic pancytopenia, spinal fusion (04/2021), epidural abscess (GBS) with removal of hardware (02/2022), T10-pelvis spinal fusion with hardware (03/2022), obesity,  and neurogenic bladder who was admitted to [facility] on 12/23/22 with back pain and increasing weakness in the lower extremities.       Urine and blood cx from that admission + for ESBL E coli which was treated with ertapenem.  Repeat blood cx on 12/28 and 1/10 negative.   Patient started on physical therapy but weakness in the lower extremities increased, and the patient is no longer able to walk. She is able to urinate intermittently and defecate.      An MRI cervical spine 1/10 was significant  for multilevel spondylosis.  MRI brain with nonspecific findings.  Xray of spine notable for focal kyphosis at T9-10 increased when compared to prior scoliosis radiographs.  An MRI of the spine was not available due to the patient's size.  and she was transferred to [facility] for advanced imaging and Neurosurgery evaluation.      MRI  imaging here  on 1/20 concerning for worsening kyphosis, extensive edema in the paraspinal muscle. 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  Utox was positive for methadone, opiates, amphetamines, and THC.  Seen by ID at [facility] who recommend IV ceftriaxone X 6 weeks at [facility], but patient was not agreeable to this.  Offered  daily ceftriaxone infusions at infusion center.  At discharge from there she had received 8 days of ceftriaxone. Review of notes show she was subsequently prescribed 4 weeks of Bactrim 2 DS tabs twice daily.       Interval History:     No adverse events.    Review of Systems   Constitutional:  Negative for chills, fatigue, fever and unexpected weight change.   HENT:  Negative for ear pain, facial swelling, hearing loss, mouth sores, nosebleeds, rhinorrhea, sinus pressure, sore throat, tinnitus, trouble swallowing and voice change.    Eyes:  Negative for photophobia, pain, redness and visual disturbance.   Respiratory:  Negative for cough, chest tightness, shortness of breath and wheezing.    Cardiovascular:  Negative for chest pain, palpitations and leg swelling.   Gastrointestinal:  Negative for abdominal pain, blood in stool, constipation, diarrhea, nausea and vomiting.   Endocrine: Negative for cold intolerance, heat intolerance, polydipsia, polyphagia and polyuria.   Genitourinary:  Negative for decreased urine volume, dysuria, flank pain, frequency, hematuria, menstrual problem, urgency, vaginal bleeding, vaginal discharge and vaginal pain.   Musculoskeletal:  Positive for back pain and myalgias. Negative for arthralgias, joint swelling and neck pain.    Skin:  Negative for rash.   Allergic/Immunologic: Negative for environmental allergies, food allergies and immunocompromised state.   Neurological:  Negative for dizziness, seizures, syncope, weakness, light-headedness, numbness and headaches.   Hematological:  Negative for adenopathy. Does not bruise/bleed easily.   Psychiatric/Behavioral:  Negative for confusion, hallucinations, self-injury, sleep disturbance and suicidal ideas. The patient is not nervous/anxious.    Objective:     Vital Signs (Most Recent):  Temp: 98.5 °F (36.9 °C) (01/28/23 2156)  Pulse: (!) 118 (01/28/23 2201)  Resp: 19 (01/28/23 2201)  BP: (!) 97/53 (01/28/23 2201)  SpO2: 100 % (01/28/23 2201)   Vital Signs (24h Range):  Temp:  [98.1 °F (36.7 °C)-98.6 °F (37 °C)] 98.5 °F (36.9 °C)  Pulse:  [106-118] 118  Resp:  [10-30] 19  SpO2:  [98 %-100 %] 100 %  BP: ()/(51-66) 97/53     Weight: 104 kg (229 lb 4.5 oz)  Body mass index is 35.91 kg/m².    Estimated Creatinine Clearance: 181.9 mL/min (based on SCr of 0.5 mg/dL).    Physical Exam  Vitals and nursing note reviewed.   Constitutional:       Appearance: She is well-developed.   HENT:      Head: Normocephalic and atraumatic.      Right Ear: External ear normal.      Left Ear: External ear normal.   Eyes:      General: No scleral icterus.        Right eye: No discharge.         Left eye: No discharge.      Conjunctiva/sclera: Conjunctivae normal.   Cardiovascular:      Rate and Rhythm: Normal rate and regular rhythm.      Heart sounds: Normal heart sounds. No murmur heard.    No friction rub. No gallop.   Pulmonary:      Effort: Pulmonary effort is normal. No respiratory distress.      Breath sounds: Normal breath sounds. No stridor. No wheezing or rales.   Abdominal:      General: Bowel sounds are normal.   Musculoskeletal:         General: No tenderness. Normal range of motion.   Skin:     General: Skin is warm and dry.   Neurological:      Mental Status: She is alert and oriented to  person, place, and time.       Significant Labs:   Microbiology Results (last 7 days)       Procedure Component Value Units Date/Time    Aerobic culture [[id number]]  (Abnormal)  (Susceptibility) Collected: 01/24/23 0943    Order Status: Completed Specimen: Bone from Back Updated: 01/28/23 1145     Aerobic Bacterial Culture ESCHERICHIA COLI ESBL  From broth only      Narrative:      4) Perispinal    Blood culture [[id number]] Collected: 01/27/23 0120    Order Status: Completed Specimen: Blood from Peripheral, Forearm, Right Updated: 01/28/23 0613     Blood Culture, Routine No Growth to date      No Growth to date    Blood culture [[id number]] Collected: 01/27/23 0123    Order Status: Completed Specimen: Blood from Peripheral, Antecubital, Left Updated: 01/28/23 0613     Blood Culture, Routine No Growth to date      No Growth to date    Aerobic culture [[id number]]  (Abnormal)  (Susceptibility) Collected: 01/24/23 0914    Order Status: Completed Specimen: Wound from Back Updated: 01/26/23 1306     Aerobic Bacterial Culture ESCHERICHIA COLI  Rare      Narrative:      1) Perispinal    Aerobic culture [[id number]]  (Abnormal)  (Susceptibility) Collected: 01/24/23 0914    Order Status: Completed Specimen: Wound from Back Updated: 01/26/23 1306     Aerobic Bacterial Culture ESCHERICHIA COLI ESBL  Few      Narrative:      2) Perispinal    Aerobic culture [[id number]]  (Abnormal)  (Susceptibility) Collected: 01/24/23 0943    Order Status: Completed Specimen: Bone from Back Updated: 01/26/23 1252     Aerobic Bacterial Culture ESCHERICHIA COLI ESBL  Few      Narrative:      3) Perispinal    Culture, Anaerobe [[id number]] Collected: 01/24/23 0943    Order Status: Completed Specimen: Bone from Back Updated: 01/26/23 1238     Anaerobic Culture Culture in progress    Narrative:      4) Perispinal    Culture, Anaerobe [[id number]] Collected: 01/24/23 0943    Order Status: Completed Specimen: Bone from Back Updated: 01/26/23 1237      Anaerobic Culture Culture in progress    Narrative:      3) Perispinal    Culture, Anaerobe [[id number]] Collected: 01/24/23 0913    Order Status: Completed Specimen: Body Fluid from Back Updated: 01/26/23 1237     Anaerobic Culture Culture in progress    Narrative:      1) Perispinal    AFB Culture & Smear [[id number]] Collected: 01/24/23 0943    Order Status: Completed Specimen: Bone from Back Updated: 01/25/23 2127     AFB Culture & Smear Culture in progress     AFB CULTURE STAIN No acid fast bacilli seen.    Narrative:      4) Perispinal    AFB Culture & Smear [[id number]] Collected: 01/24/23 0943    Order Status: Completed Specimen: Bone from Back Updated: 01/25/23 2127     AFB Culture & Smear Culture in progress     AFB CULTURE STAIN No acid fast bacilli seen.    Narrative:      3) Perispinal    AFB Culture & Smear [[id number]] Collected: 01/24/23 0913    Order Status: Completed Specimen: Body Fluid from Back Updated: 01/25/23 2127     AFB Culture & Smear Culture in progress     AFB CULTURE STAIN No acid fast bacilli seen.    Narrative:      1) Perispinal    AFB Culture & Smear [[id number]] Collected: 01/24/23 0913    Order Status: Completed Specimen: Body Fluid from Back Updated: 01/25/23 2127     AFB Culture & Smear Culture in progress     AFB CULTURE STAIN No acid fast bacilli seen.    Narrative:      2) Perispinal    Blood culture [[id number]] Collected: 01/20/23 1131    Order Status: Completed Specimen: Blood Updated: 01/25/23 1412     Blood Culture, Routine No growth after 5 days.    Narrative:      Collection has been rescheduled by [facility] at 01/20/2023 11:03 Reason:   Patient unavailable in room with doctors   Collection has been rescheduled by [facility] at 01/20/2023 11:03 Reason:   Patient unavailable in room with doctors     Blood culture [[id number]] Collected: 01/20/23 1131    Order Status: Completed Specimen: Blood Updated: 01/25/23 1412     Blood Culture, Routine No growth after 5 days.    Narrative:       Collection has been rescheduled by [facility] at 01/20/2023 11:03 Reason:   Patient unavailable in room with doctors   Collection has been rescheduled by [facility] at 01/20/2023 11:03 Reason:   Patient unavailable in room with doctors     Culture, Anaerobe [[id number]] Collected: 01/24/23 0913    Order Status: Completed Specimen: Body Fluid from Back Updated: 01/25/23 0658     Anaerobic Culture Culture in progress    Narrative:      2) Perispinal    Gram stain [[id number]] Collected: 01/24/23 0913    Order Status: Completed Specimen: Body Fluid from Back Updated: 01/24/23 1239     Gram Stain Result No WBC's      No organisms seen    Narrative:      1) Perispinal    Gram stain [[id number]] Collected: 01/24/23 0944    Order Status: Completed Specimen: Bone from Back Updated: 01/24/23 1238     Gram Stain Result Rare WBC's      No organisms seen    Narrative:      3) Perispinal    Gram stain [[id number]] Collected: 01/24/23 0913    Order Status: Completed Specimen: Body Fluid from Back Updated: 01/24/23 1237     Gram Stain Result Rare WBC's      No organisms seen    Narrative:      2) Perispinal    Gram stain [[id number]] Collected: 01/24/23 0943    Order Status: Completed Specimen: Bone from Back Updated: 01/24/23 1236     Gram Stain Result No WBC's      No organisms seen    Narrative:      4) Perispinal    Fungus culture [[id number]] Collected: 01/24/23 0943    Order Status: Sent Specimen: Bone from Back Updated: 01/24/23 1009    Fungus culture [[id number]] Collected: 01/24/23 0943    Order Status: Sent Specimen: Bone from Back Updated: 01/24/23 1007    Fungus culture [[id number]] Collected: 01/24/23 0913    Order Status: Sent Specimen: Body Fluid from Back Updated: 01/24/23 0929    Fungus culture [[id number]] Collected: 01/24/23 0913    Order Status: Sent Specimen: Body Fluid from Back Updated: 01/24/23 0927            Significant Imaging: I have reviewed all pertinent imaging results/findings within the past 24 hours.

## 2023-01-29 NOTE — PROGRESS NOTES
[facility] - Neuro Critical Care  Neurocritical Care  Progress Note    Admit Date: 1/19/2023  Service Date: 01/28/2023  Length of Stay: 9    Subjective:     Chief Complaint: Weakness of both lower extremities    History of Present Illness: 42-year-old female with a history of IV drug use, chronic hep C with cirrhosis, spinal fusion in April 2021, complicated by epidural abscess with removal of hardware February 2022 and against spinal fusion in March 2022 of T10 through the pelvis as well as neurogenic bladder who initially presented to [facility] in late December for generalized weakness, fatigue and bilateral flank pain.  Her course at [facility] was complicated by a E coli bacteremia thought to be due to a urinary source, she is completed a 7 day course of meropenem on 1/3, and had bcx from 1/2 that did not grow any bacteria.       She also had an ICU admission for ongoing back spasms and agitation requiring Precedex.  In early January patient started developing progressive lower extremity weakness greater in the left compared to the right.  She also had an MRI at the outside hospital that showed some white matter changes concerning for possible demyelinating disease.  However her thoracic spine radiographs also showed worsening of her kyphosis at T9-T10.  Due to the concern for either a demyelinating polyneuropathy, MS, or spinal cord compression the patient was transferred to [facility] for neurosurgical evaluation.       On admission the patient had a CT lumbar and thoracic spine and an MRI CTL spine. The MRI showed  focal kyphosis T8 through T10, with possible associated cord edema signal.  As well as extensive edema throughout the posterior paraspinal musculature concerning for possible infectious or inflammatory process.  There was concern that the findings at T8 through T10 could correspond with compressive myelopathy.  The CT lumbar spine and thoracic spine showed spondylodiscitis at T8  through T10, as well as pathologic compression fractures at T9 and T10.  There were also erosive changes involving the sacroiliac joints bilaterally which was concerning for sacroiliitis and could be infectious.     Patient underwent laminectomy T8-T10; posterior fusion T8-T12; and washout by NSGY today. No complications noted during surgery. Patient is HDS and on 3L NC. Patient is currently on a PCA dilaudid pump.            Hospital Course: 1/25/2023 NAEO, pain well controlled on current regimen. Continue ABX. Maintain logroll precautions and HOB <30 until second stage of surgery.  01/26/2023 Hemoglobin 6.6, repeat 6.2. Increased drainage noted from bilateral hemovac. S/p 1u pRBC. PICC team consulted for long term access for antibiotics, remove IJ central line when PICC placed.   01/27/2023: NAEON. HV to gravity with decreased drainge. Hgb 7.2 this AM, will trend CBC q12h. Lytes replaced. Bowel regimen adjusted.   01/28/2023: NAEON. Received 1 unit PBRC yesterday. Hgb 7.4 this AM. Plan for OR 2/2.       Interval History:      Review of Systems   Constitutional:  Negative for fever.   HENT:  Negative for congestion and rhinorrhea.    Eyes:  Negative for visual disturbance.   Respiratory:  Negative for shortness of breath.    Cardiovascular:  Negative for chest pain.   Gastrointestinal:  Negative for abdominal distention, abdominal pain, nausea and vomiting.   Genitourinary:  Negative for dysuria and hematuria.   Musculoskeletal:  Positive for back pain. Negative for neck pain.   Neurological:  Positive for weakness. Negative for dizziness, seizures, light-headedness, numbness and headaches.     Objective:     Vitals:  Temp: 98.4 °F (36.9 °C)  Pulse: 110  Rhythm: sinus tachycardia  BP: (!) 101/59  MAP (mmHg): 75  Resp: 16  SpO2: 99 %    Temp  Min: 98.1 °F (36.7 °C)  Max: 98.6 °F (37 °C)  Pulse  Min: 106  Max: 117  BP  Min: 98/60  Max: 129/58  MAP (mmHg)  Min: 72  Max: 84  Resp  Min: 10  Max: 30  SpO2  Min: 98 %   Max: 100 %    01/27 0701 - 01/28 0700  In: 1737.2 [P.O.:1050; I.V.:40]  Out: 965 [Urine:270; Drains:695]   Unmeasured Output  Urine Occurrence: 1  Stool Occurrence: 1  Pad Count: 1       Physical Exam  Vitals and nursing note reviewed.   Constitutional:       General: She is not in acute distress.     Appearance: She is obese. She is not ill-appearing, toxic-appearing or diaphoretic.   HENT:      Head: Normocephalic.      Mouth/Throat:      Mouth: Mucous membranes are moist.   Eyes:      General: No scleral icterus.        Right eye: No discharge.         Left eye: No discharge.   Cardiovascular:      Rate and Rhythm: Normal rate and regular rhythm.   Pulmonary:      Effort: Pulmonary effort is normal. No respiratory distress.      Comments: NC in place  Abdominal:      General: Abdomen is flat.      Palpations: Abdomen is soft.      Tenderness: There is no abdominal tenderness.   Musculoskeletal:         General: No deformity.      Cervical back: No rigidity.   Skin:     General: Skin is warm and dry.      Coloration: Skin is not jaundiced.   Neurological:      Mental Status: She is alert and oriented to person, place, and time. Mental status is at baseline.      Motor: Weakness (Bilateral LE) and tremor (Bilateral UE) present.      Comments:   E4 V5 M6  Awake, alert, and oriented to self, time, place, and situation.   Answering all questions appropriately and following all commands briskly.   No facial droop. EOMI. PERRL. Peripheral vision intact bilaterally.   FOWLER spontaneously and follows commands  RUE: 3/5  LUE: 3/5  RLE: 2/5  LLE: 2/5  Sensation intact     Psychiatric:         Mood and Affect: Mood normal.         Behavior: Behavior normal.     Gait & coordination exams.     Medications:  Continuoushydromorphone in 0.9 % NaCl 6 mg/30 ml    Scheduledacetaminophen, 1,000 mg, TID  buPROPion, 300 mg, Daily  gabapentin, 600 mg, TID  lactulose, 20 g, TID  meropenem (MERREM) IVPB, 2 g, Q8H  methocarbamoL, 1,000 mg,  QID  pantoprazole, 40 mg, Daily  polyethylene glycol, 17 g, BID  senna-docusate 8.6-50 mg, 2 tablet, BID  sodium chloride 0.9%, 10 mL, Q6H    PRNsodium chloride, , Q24H PRN  sodium chloride, , Q24H PRN  dextrose 10%, 12.5 g, PRN  dextrose 10%, 25 g, PRN  diazePAM, 5 mg, Q6H PRN  glucagon (human recombinant), 1 mg, PRN  glucose, 16 g, PRN  glucose, 24 g, PRN  hydrALAZINE, 10 mg, Q6H PRN  labetalol, 10 mg, Q4H PRN  magnesium hydroxide 400 mg/5 ml, 30 mL, Daily PRN  magnesium oxide, 800 mg, PRN  magnesium oxide, 800 mg, PRN  melatonin, 6 mg, Nightly PRN  naloxone, 0.02 mg, PRN  ondansetron, 4 mg, Q6H PRN  oxyCODONE, 5 mg, Q4H PRN  polyethylene glycol, 17 g, Daily PRN  potassium bicarbonate, 35 mEq, PRN  potassium bicarbonate, 50 mEq, PRN  potassium bicarbonate, 60 mEq, PRN  potassium, sodium phosphates, 2 packet, PRN  potassium, sodium phosphates, 2 packet, PRN  potassium, sodium phosphates, 2 packet, PRN  prochlorperazine, 2.5 mg, Q6H PRN  sodium chloride 0.9%, 10 mL, Q12H PRN  sodium chloride 0.9%, 10 mL, PRN      Today I personally reviewed pertinent medications, lines/drains/airways, laboratory results, microbiology results, notably:    Laboratory:  CBC:  Recent Labs   Lab 01/28/23  0832   WBC 5.22   RBC 2.56*   HGB 7.4*   HCT 23.3*   PLT 93*   MCV 91   MCH 28.9   MCHC 31.8*       CMP:  Recent Labs   Lab 01/28/23  0425   CALCIUM 8.0*   PROT 5.1*   *   K 4.1   CO2 25      BUN 16   CREATININE 0.5   ALKPHOS 96   ALT 29   AST 54*   BILITOT 2.3*     Microbiology:  Microbiology Results (last 7 days)       Procedure Component Value Units Date/Time    Aerobic culture [[id number]]  (Abnormal)  (Susceptibility) Collected: 01/24/23 0925    Order Status: Completed Specimen: Bone from Back Updated: 01/28/23 1145     Aerobic Bacterial Culture ESCHERICHIA COLI ESBL  From broth only      Narrative:      4) Perispinal    Blood culture [[id number]] Collected: 01/27/23 0120    Order Status: Completed Specimen: Blood from  Peripheral, Forearm, Right Updated: 01/28/23 0613     Blood Culture, Routine No Growth to date      No Growth to date    Blood culture [[id number]] Collected: 01/27/23 0123    Order Status: Completed Specimen: Blood from Peripheral, Antecubital, Left Updated: 01/28/23 0613     Blood Culture, Routine No Growth to date      No Growth to date    Aerobic culture [[id number]]  (Abnormal)  (Susceptibility) Collected: 01/24/23 0914    Order Status: Completed Specimen: Wound from Back Updated: 01/26/23 1306     Aerobic Bacterial Culture ESCHERICHIA COLI  Rare      Narrative:      1) Perispinal    Aerobic culture [[id number]]  (Abnormal)  (Susceptibility) Collected: 01/24/23 0914    Order Status: Completed Specimen: Wound from Back Updated: 01/26/23 1306     Aerobic Bacterial Culture ESCHERICHIA COLI ESBL  Few      Narrative:      2) Perispinal    Aerobic culture [[id number]]  (Abnormal)  (Susceptibility) Collected: 01/24/23 0943    Order Status: Completed Specimen: Bone from Back Updated: 01/26/23 1252     Aerobic Bacterial Culture ESCHERICHIA COLI ESBL  Few      Narrative:      3) Perispinal    Culture, Anaerobe [[id number]] Collected: 01/24/23 0943    Order Status: Completed Specimen: Bone from Back Updated: 01/26/23 1238     Anaerobic Culture Culture in progress    Narrative:      4) Perispinal    Culture, Anaerobe [[id number]] Collected: 01/24/23 0943    Order Status: Completed Specimen: Bone from Back Updated: 01/26/23 1237     Anaerobic Culture Culture in progress    Narrative:      3) Perispinal    Culture, Anaerobe [[id number]] Collected: 01/24/23 0913    Order Status: Completed Specimen: Body Fluid from Back Updated: 01/26/23 1237     Anaerobic Culture Culture in progress    Narrative:      1) Perispinal    AFB Culture & Smear [[id number]] Collected: 01/24/23 0943    Order Status: Completed Specimen: Bone from Back Updated: 01/25/23 2127     AFB Culture & Smear Culture in progress     AFB CULTURE STAIN No acid fast bacilli  seen.    Narrative:      4) Perispinal    AFB Culture & Smear [[id number]] Collected: 01/24/23 0943    Order Status: Completed Specimen: Bone from Back Updated: 01/25/23 2127     AFB Culture & Smear Culture in progress     AFB CULTURE STAIN No acid fast bacilli seen.    Narrative:      3) Perispinal    AFB Culture & Smear [[id number]] Collected: 01/24/23 0913    Order Status: Completed Specimen: Body Fluid from Back Updated: 01/25/23 2127     AFB Culture & Smear Culture in progress     AFB CULTURE STAIN No acid fast bacilli seen.    Narrative:      1) Perispinal    AFB Culture & Smear [[id number]] Collected: 01/24/23 0913    Order Status: Completed Specimen: Body Fluid from Back Updated: 01/25/23 2127     AFB Culture & Smear Culture in progress     AFB CULTURE STAIN No acid fast bacilli seen.    Narrative:      2) Perispinal    Blood culture [[id number]] Collected: 01/20/23 1131    Order Status: Completed Specimen: Blood Updated: 01/25/23 1412     Blood Culture, Routine No growth after 5 days.    Narrative:      Collection has been rescheduled by [facility] at 01/20/2023 11:03 Reason:   Patient unavailable in room with doctors   Collection has been rescheduled by [facility] at 01/20/2023 11:03 Reason:   Patient unavailable in room with doctors     Blood culture [[id number]] Collected: 01/20/23 1131    Order Status: Completed Specimen: Blood Updated: 01/25/23 1412     Blood Culture, Routine No growth after 5 days.    Narrative:      Collection has been rescheduled by [facility] at 01/20/2023 11:03 Reason:   Patient unavailable in room with doctors   Collection has been rescheduled by 2 at 01/20/2023 11:03 Reason:   Patient unavailable in room with doctors     Culture, Anaerobe [[id number]] Collected: 01/24/23 0913    Order Status: Completed Specimen: Body Fluid from Back Updated: 01/25/23 0658     Anaerobic Culture Culture in progress    Narrative:      2) Perispinal    Gram stain [[id number]] Collected: 01/24/23 0913    Order Status:  Completed Specimen: Body Fluid from Back Updated: 01/24/23 1239     Gram Stain Result No WBC's      No organisms seen    Narrative:      1) Perispinal    Gram stain [[id number]] Collected: 01/24/23 0944    Order Status: Completed Specimen: Bone from Back Updated: 01/24/23 1238     Gram Stain Result Rare WBC's      No organisms seen    Narrative:      3) Perispinal    Gram stain [[id number]] Collected: 01/24/23 0913    Order Status: Completed Specimen: Body Fluid from Back Updated: 01/24/23 1237     Gram Stain Result Rare WBC's      No organisms seen    Narrative:      2) Perispinal    Gram stain [[id number]] Collected: 01/24/23 0943    Order Status: Completed Specimen: Bone from Back Updated: 01/24/23 1236     Gram Stain Result No WBC's      No organisms seen    Narrative:      4) Perispinal    Fungus culture [[id number]] Collected: 01/24/23 0943    Order Status: Sent Specimen: Bone from Back Updated: 01/24/23 1009    Fungus culture [[id number]] Collected: 01/24/23 0943    Order Status: Sent Specimen: Bone from Back Updated: 01/24/23 1007    Fungus culture [[id number]] Collected: 01/24/23 0913    Order Status: Sent Specimen: Body Fluid from Back Updated: 01/24/23 0929    Fungus culture [[id number]] Collected: 01/24/23 0913    Order Status: Sent Specimen: Body Fluid from Back Updated: 01/24/23 0927               Diet  Diet Adult Regular (IDDSI Level 7)  Diet Adult Regular (IDDSI Level 7)    Assessment/Plan:     Psychiatric  Anxiety and depression  Continue Buproprion for depression  Continue Lorazepam for anxiety  Continue Gabapentin for neuropathy and anxiety    Substance use disorder  Denies IV drug use (meth) for past 3 years.  Denies alcohol and tobacco abuse.       ID  Thoracic discitis  CT T/L spine thin cut 1/20 with haloing of pelvic and T10 screws, spondylodiscitis T8-9, T9-10  CT C spine with concern for discitis at C6-7  Patient is currently afebrile with chronic pancytopenia  Previously on Amoxicillin up until  surgery for chronic suppressive therapy.  ID following  Blood cx (1/20) NGTD  Intra-operative cultures with GNRs  Continue Vancomycin and meropenem  Trend CBC daily    GI  Chronic hepatitis C with cirrhosis  Hx of Hep C. See Cirrhosis of Liver    Cirrhosis of liver with ascites  Patient has reportedly refused transplant evaluation in the past.   -Daily CMP and trend LFTs  -Continue Lactulose 20g TID   -Will need GI/hepatology follow up outpatient    MELD-Na score: 18 at 1/26/2023 11:42 PM  MELD score: 13 at 1/26/2023 11:42 PM  Calculated from:  Serum Creatinine: 0.5 mg/dL (Using min of 1 mg/dL) at 1/26/2023 11:42 PM  Serum Sodium: 131 mmol/L at 1/26/2023 11:42 PM  Total Bilirubin: 1.9 mg/dL at 1/26/2023 11:42 PM  INR(ratio): 1.4 at 1/25/2023  1:08 AM  Age: 42 years    Orthopedic  * Weakness of both lower extremities  43 yo F w/ history of multiple spinal surgeries presented to OSH with possible infection of patient's shoulder and found to have UTI and E. Coli bacteremia and progressively worse B/L LE weakness. Transferred to C for neurosurgical evaluation. Underwent Laminectomy T8-T10; Posterior spinal fusion T8-T12; hardware removal and washout. Admitted to [facility] for HLOC. Arrived to unit on PCA dilaudid pump. Patient is HDS on 3L NC.     -Neuro checks q2hr  -Vital signs q2hr  -NSGY following  -MAP goals >65  -SBP goals <180  -PRN labetalol, hydralazine  -Patient will need to lie flat with HOB 15-20  -Started on Vancomycin and meropenem, PICC consulted  -MM pain regimen; PCA Dilaudid pump for pain management  -Aggressive bowel regimen  -TSH, Lipid panel, and A1C pending  -PT/INR, aPTT   -CMP, Mag, Phos, CBC daily  -Transfuse for Hg<7  -PT/OT        The patient is being Prophylaxed for:  Venous Thromboembolism with: Mechanical or Chemical  Stress Ulcer with: H2B  Ventilator Pneumonia with: not applicable    Activity Orders          Turn patient starting at 01/24 1800    Up in chair With meals starting at 01/24 1700     Diet Adult Regular (IDDSI Level 7): Regular starting at 01/24 1106    Progressive Mobility Protocol (mobilize patient to their highest level of functioning at least twice daily) starting at 01/24 1105    Elevate HOB 30 starting at 01/24 1105    Ambulate With Assistance, Post Op Day 0 If the patient arrives from PACU by 4PM, walk at least once on day of operation if alert and safe to do so. starting at 01/24 1104        Full Code    Vaishnavi Adames PA-C  Neurocritical Care  [facility] - Neuro Critical Care

## 2023-01-29 NOTE — SUBJECTIVE & OBJECTIVE
Interval History:     No adverse events.    Review of Systems   Constitutional:  Negative for chills, fatigue, fever and unexpected weight change.   HENT:  Negative for ear pain, facial swelling, hearing loss, mouth sores, nosebleeds, rhinorrhea, sinus pressure, sore throat, tinnitus, trouble swallowing and voice change.    Eyes:  Negative for photophobia, pain, redness and visual disturbance.   Respiratory:  Negative for cough, chest tightness, shortness of breath and wheezing.    Cardiovascular:  Negative for chest pain, palpitations and leg swelling.   Gastrointestinal:  Negative for abdominal pain, blood in stool, constipation, diarrhea, nausea and vomiting.   Endocrine: Negative for cold intolerance, heat intolerance, polydipsia, polyphagia and polyuria.   Genitourinary:  Negative for decreased urine volume, dysuria, flank pain, frequency, hematuria, menstrual problem, urgency, vaginal bleeding, vaginal discharge and vaginal pain.   Musculoskeletal:  Positive for back pain and myalgias. Negative for arthralgias, joint swelling and neck pain.   Skin:  Negative for rash.   Allergic/Immunologic: Negative for environmental allergies, food allergies and immunocompromised state.   Neurological:  Negative for dizziness, seizures, syncope, weakness, light-headedness, numbness and headaches.   Hematological:  Negative for adenopathy. Does not bruise/bleed easily.   Psychiatric/Behavioral:  Negative for confusion, hallucinations, self-injury, sleep disturbance and suicidal ideas. The patient is not nervous/anxious.    Objective:     Vital Signs (Most Recent):  Temp: 98.5 °F (36.9 °C) (01/28/23 2156)  Pulse: (!) 118 (01/28/23 2201)  Resp: 19 (01/28/23 2201)  BP: (!) 97/53 (01/28/23 2201)  SpO2: 100 % (01/28/23 2201)   Vital Signs (24h Range):  Temp:  [98.1 °F (36.7 °C)-98.6 °F (37 °C)] 98.5 °F (36.9 °C)  Pulse:  [106-118] 118  Resp:  [10-30] 19  SpO2:  [98 %-100 %] 100 %  BP: ()/(51-66) 97/53     Weight: 104 kg (229  lb 4.5 oz)  Body mass index is 35.91 kg/m².    Estimated Creatinine Clearance: 181.9 mL/min (based on SCr of 0.5 mg/dL).    Physical Exam  Vitals and nursing note reviewed.   Constitutional:       Appearance: She is well-developed.   HENT:      Head: Normocephalic and atraumatic.      Right Ear: External ear normal.      Left Ear: External ear normal.   Eyes:      General: No scleral icterus.        Right eye: No discharge.         Left eye: No discharge.      Conjunctiva/sclera: Conjunctivae normal.   Cardiovascular:      Rate and Rhythm: Normal rate and regular rhythm.      Heart sounds: Normal heart sounds. No murmur heard.    No friction rub. No gallop.   Pulmonary:      Effort: Pulmonary effort is normal. No respiratory distress.      Breath sounds: Normal breath sounds. No stridor. No wheezing or rales.   Abdominal:      General: Bowel sounds are normal.   Musculoskeletal:         General: No tenderness. Normal range of motion.   Skin:     General: Skin is warm and dry.   Neurological:      Mental Status: She is alert and oriented to person, place, and time.       Significant Labs:   Microbiology Results (last 7 days)       Procedure Component Value Units Date/Time    Aerobic culture [[id number]]  (Abnormal)  (Susceptibility) Collected: 01/24/23 0943    Order Status: Completed Specimen: Bone from Back Updated: 01/28/23 1145     Aerobic Bacterial Culture ESCHERICHIA COLI ESBL  From broth only      Narrative:      4) Perispinal    Blood culture [[id number]] Collected: 01/27/23 0120    Order Status: Completed Specimen: Blood from Peripheral, Forearm, Right Updated: 01/28/23 0613     Blood Culture, Routine No Growth to date      No Growth to date    Blood culture [[id number]] Collected: 01/27/23 0123    Order Status: Completed Specimen: Blood from Peripheral, Antecubital, Left Updated: 01/28/23 0613     Blood Culture, Routine No Growth to date      No Growth to date    Aerobic culture [[id number]]  (Abnormal)   (Susceptibility) Collected: 01/24/23 0914    Order Status: Completed Specimen: Wound from Back Updated: 01/26/23 1306     Aerobic Bacterial Culture ESCHERICHIA COLI  Rare      Narrative:      1) Perispinal    Aerobic culture [[id number]]  (Abnormal)  (Susceptibility) Collected: 01/24/23 0914    Order Status: Completed Specimen: Wound from Back Updated: 01/26/23 1306     Aerobic Bacterial Culture ESCHERICHIA COLI ESBL  Few      Narrative:      2) Perispinal    Aerobic culture [[id number]]  (Abnormal)  (Susceptibility) Collected: 01/24/23 0943    Order Status: Completed Specimen: Bone from Back Updated: 01/26/23 1252     Aerobic Bacterial Culture ESCHERICHIA COLI ESBL  Few      Narrative:      3) Perispinal    Culture, Anaerobe [[id number]] Collected: 01/24/23 0943    Order Status: Completed Specimen: Bone from Back Updated: 01/26/23 1238     Anaerobic Culture Culture in progress    Narrative:      4) Perispinal    Culture, Anaerobe [[id number]] Collected: 01/24/23 0943    Order Status: Completed Specimen: Bone from Back Updated: 01/26/23 1237     Anaerobic Culture Culture in progress    Narrative:      3) Perispinal    Culture, Anaerobe [[id number]] Collected: 01/24/23 0913    Order Status: Completed Specimen: Body Fluid from Back Updated: 01/26/23 1237     Anaerobic Culture Culture in progress    Narrative:      1) Perispinal    AFB Culture & Smear [[id number]] Collected: 01/24/23 0943    Order Status: Completed Specimen: Bone from Back Updated: 01/25/23 2127     AFB Culture & Smear Culture in progress     AFB CULTURE STAIN No acid fast bacilli seen.    Narrative:      4) Perispinal    AFB Culture & Smear [[id number]] Collected: 01/24/23 0943    Order Status: Completed Specimen: Bone from Back Updated: 01/25/23 2127     AFB Culture & Smear Culture in progress     AFB CULTURE STAIN No acid fast bacilli seen.    Narrative:      3) Perispinal    AFB Culture & Smear [[id number]] Collected: 01/24/23 0913    Order Status:  Completed Specimen: Body Fluid from Back Updated: 01/25/23 2127     AFB Culture & Smear Culture in progress     AFB CULTURE STAIN No acid fast bacilli seen.    Narrative:      1) Perispinal    AFB Culture & Smear [[id number]] Collected: 01/24/23 0913    Order Status: Completed Specimen: Body Fluid from Back Updated: 01/25/23 2127     AFB Culture & Smear Culture in progress     AFB CULTURE STAIN No acid fast bacilli seen.    Narrative:      2) Perispinal    Blood culture [[id number]] Collected: 01/20/23 1131    Order Status: Completed Specimen: Blood Updated: 01/25/23 1412     Blood Culture, Routine No growth after 5 days.    Narrative:      Collection has been rescheduled by [facility] at 01/20/2023 11:03 Reason:   Patient unavailable in room with doctors   Collection has been rescheduled by [facility] at 01/20/2023 11:03 Reason:   Patient unavailable in room with doctors     Blood culture [[id number]] Collected: 01/20/23 1131    Order Status: Completed Specimen: Blood Updated: 01/25/23 1412     Blood Culture, Routine No growth after 5 days.    Narrative:      Collection has been rescheduled by [facility] at 01/20/2023 11:03 Reason:   Patient unavailable in room with doctors   Collection has been rescheduled by [facility] at 01/20/2023 11:03 Reason:   Patient unavailable in room with doctors     Culture, Anaerobe [[id number]] Collected: 01/24/23 0913    Order Status: Completed Specimen: Body Fluid from Back Updated: 01/25/23 0658     Anaerobic Culture Culture in progress    Narrative:      2) Perispinal    Gram stain [[id number]] Collected: 01/24/23 0913    Order Status: Completed Specimen: Body Fluid from Back Updated: 01/24/23 1239     Gram Stain Result No WBC's      No organisms seen    Narrative:      1) Perispinal    Gram stain [[id number]] Collected: 01/24/23 0944    Order Status: Completed Specimen: Bone from Back Updated: 01/24/23 1238     Gram Stain Result Rare WBC's      No organisms seen    Narrative:      3) Perispinal    Gram stain  [[id number]] Collected: 01/24/23 0913    Order Status: Completed Specimen: Body Fluid from Back Updated: 01/24/23 1237     Gram Stain Result Rare WBC's      No organisms seen    Narrative:      2) Perispinal    Gram stain [[id number]] Collected: 01/24/23 0943    Order Status: Completed Specimen: Bone from Back Updated: 01/24/23 1236     Gram Stain Result No WBC's      No organisms seen    Narrative:      4) Perispinal    Fungus culture [[id number]] Collected: 01/24/23 0943    Order Status: Sent Specimen: Bone from Back Updated: 01/24/23 1009    Fungus culture [[id number]] Collected: 01/24/23 0943    Order Status: Sent Specimen: Bone from Back Updated: 01/24/23 1007    Fungus culture [[id number]] Collected: 01/24/23 0913    Order Status: Sent Specimen: Body Fluid from Back Updated: 01/24/23 0929    Fungus culture [[id number]] Collected: 01/24/23 0913    Order Status: Sent Specimen: Body Fluid from Back Updated: 01/24/23 0927            Significant Imaging: I have reviewed all pertinent imaging results/findings within the past 24 hours.

## 2023-01-29 NOTE — SUBJECTIVE & OBJECTIVE
Interval History:  See hospital course above.     Review of Systems   Constitutional:  Negative for fever.   HENT:  Negative for congestion and rhinorrhea.    Eyes:  Negative for visual disturbance.   Respiratory:  Negative for shortness of breath.    Cardiovascular:  Negative for chest pain.   Gastrointestinal:  Negative for abdominal distention, abdominal pain, nausea and vomiting.   Genitourinary:  Negative for dysuria and hematuria.   Musculoskeletal:  Positive for back pain. Negative for neck pain.   Neurological:  Positive for weakness. Negative for dizziness, seizures, light-headedness, numbness and headaches.     Objective:     Vitals:  Temp: 98.5 °F (36.9 °C)  Pulse: 105  Rhythm: sinus tachycardia  BP: (!) 105/50  MAP (mmHg): 72  Resp: 18  SpO2: 100 %    Temp  Min: 98.1 °F (36.7 °C)  Max: 98.7 °F (37.1 °C)  Pulse  Min: 102  Max: 118  BP  Min: 87/49  Max: 118/60  MAP (mmHg)  Min: 65  Max: 84  Resp  Min: 10  Max: 30  SpO2  Min: 94 %  Max: 100 %    01/28 0701 - 01/29 0700  In: 767.6 [P.O.:360]  Out: 1880 [Urine:1500; Drains:380]   Unmeasured Output  Urine Occurrence: 1  Stool Occurrence: 1  Pad Count: 2       Physical Exam  Vitals and nursing note reviewed.   Constitutional:       General: She is not in acute distress.     Appearance: She is obese. She is not ill-appearing, toxic-appearing or diaphoretic.   HENT:      Head: Normocephalic.      Mouth/Throat:      Mouth: Mucous membranes are moist.   Eyes:      General: No scleral icterus.        Right eye: No discharge.         Left eye: No discharge.   Cardiovascular:      Rate and Rhythm: Normal rate and regular rhythm.   Pulmonary:      Effort: Pulmonary effort is normal. No respiratory distress.      Comments: NC in place  Abdominal:      General: Abdomen is flat.      Palpations: Abdomen is soft.      Tenderness: There is no abdominal tenderness.   Musculoskeletal:         General: No deformity.      Cervical back: No rigidity.   Skin:     General: Skin is  warm and dry.      Coloration: Skin is not jaundiced.   Neurological:      Mental Status: She is alert and oriented to person, place, and time. Mental status is at baseline.      Motor: Weakness (Bilateral LE) and tremor (Bilateral UE) present.      Comments:   E4 V5 M6  Awake, alert, and oriented to self, time, place, and situation. Speech fluent. Answering all questions appropriately and following all commands briskly.   PERRL. Extraocular movements grossly intact. No facial asymmetry. Conversational hearing intact.   FOWLER spontaneously and follows commands. Strength 4/5 in BUE & 3/5 in BLE. Sensation intact to light touch in all 4 extremities.    Psychiatric:         Mood and Affect: Mood normal.         Behavior: Behavior normal.     Gait & coordination exams deferred.      Medications:  Continuoushydromorphone in 0.9 % NaCl 6 mg/30 ml    Scheduledacetaminophen, 1,000 mg, TID  buPROPion, 300 mg, Daily  gabapentin, 600 mg, TID  lactulose, 20 g, TID  meropenem (MERREM) IVPB, 2 g, Q8H  methocarbamoL, 1,000 mg, QID  pantoprazole, 40 mg, Daily  polyethylene glycol, 17 g, BID  senna-docusate 8.6-50 mg, 2 tablet, BID  sodium chloride 0.9%, 10 mL, Q6H    PRNsodium chloride, , Q24H PRN  sodium chloride, , Q24H PRN  dextrose 10%, 12.5 g, PRN  dextrose 10%, 25 g, PRN  diazePAM, 5 mg, Q6H PRN  glucagon (human recombinant), 1 mg, PRN  glucose, 16 g, PRN  glucose, 24 g, PRN  hydrALAZINE, 10 mg, Q6H PRN  labetalol, 10 mg, Q4H PRN  magnesium hydroxide 400 mg/5 ml, 30 mL, Daily PRN  magnesium oxide, 800 mg, PRN  magnesium oxide, 800 mg, PRN  melatonin, 6 mg, Nightly PRN  naloxone, 0.02 mg, PRN  ondansetron, 4 mg, Q6H PRN  oxyCODONE, 5 mg, Q4H PRN  polyethylene glycol, 17 g, Daily PRN  potassium bicarbonate, 35 mEq, PRN  potassium bicarbonate, 50 mEq, PRN  potassium bicarbonate, 60 mEq, PRN  potassium, sodium phosphates, 2 packet, PRN  potassium, sodium phosphates, 2 packet, PRN  potassium, sodium phosphates, 2 packet,  PRN  prochlorperazine, 2.5 mg, Q6H PRN  sodium chloride 0.9%, 10 mL, Q12H PRN  sodium chloride 0.9%, 10 mL, PRN      Today I personally reviewed pertinent medications, lines/drains/airways, laboratory results, microbiology results, notably:      Laboratory:  CBC:  Recent Labs   Lab 01/29/23  0830   WBC 3.58*   RBC 2.58*   HGB 7.5*   HCT 24.3*   PLT 91*   MCV 94   MCH 29.1   MCHC 30.9*       CMP:  Recent Labs   Lab 01/29/23  0105   CALCIUM 7.6*   PROT 4.7*   *   K 4.6   CO2 25      BUN 15   CREATININE 0.4*   ALKPHOS 105   ALT 31   AST 53*   BILITOT 1.5*     Microbiology:  Microbiology Results (last 7 days)       Procedure Component Value Units Date/Time    Blood culture [[id number]] Collected: 01/27/23 0120    Order Status: Completed Specimen: Blood from Peripheral, Forearm, Right Updated: 01/29/23 0613     Blood Culture, Routine No Growth to date      No Growth to date      No Growth to date    Blood culture [[id number]] Collected: 01/27/23 0123    Order Status: Completed Specimen: Blood from Peripheral, Antecubital, Left Updated: 01/29/23 0613     Blood Culture, Routine No Growth to date      No Growth to date      No Growth to date    Aerobic culture [[id number]]  (Abnormal)  (Susceptibility) Collected: 01/24/23 0943    Order Status: Completed Specimen: Bone from Back Updated: 01/28/23 1145     Aerobic Bacterial Culture ESCHERICHIA COLI ESBL  From broth only      Narrative:      4) Perispinal    Aerobic culture [[id number]]  (Abnormal)  (Susceptibility) Collected: 01/24/23 0914    Order Status: Completed Specimen: Wound from Back Updated: 01/26/23 1306     Aerobic Bacterial Culture ESCHERICHIA COLI  Rare      Narrative:      1) Perispinal    Aerobic culture [[id number]]  (Abnormal)  (Susceptibility) Collected: 01/24/23 0914    Order Status: Completed Specimen: Wound from Back Updated: 01/26/23 1306     Aerobic Bacterial Culture ESCHERICHIA COLI ESBL  Few      Narrative:      2) Perispinal    Aerobic culture  [[id number]]  (Abnormal)  (Susceptibility) Collected: 01/24/23 0943    Order Status: Completed Specimen: Bone from Back Updated: 01/26/23 1252     Aerobic Bacterial Culture ESCHERICHIA COLI ESBL  Few      Narrative:      3) Perispinal    Culture, Anaerobe [[id number]] Collected: 01/24/23 0943    Order Status: Completed Specimen: Bone from Back Updated: 01/26/23 1238     Anaerobic Culture Culture in progress    Narrative:      4) Perispinal    Culture, Anaerobe [[id number]] Collected: 01/24/23 0943    Order Status: Completed Specimen: Bone from Back Updated: 01/26/23 1237     Anaerobic Culture Culture in progress    Narrative:      3) Perispinal    Culture, Anaerobe [[id number]] Collected: 01/24/23 0913    Order Status: Completed Specimen: Body Fluid from Back Updated: 01/26/23 1237     Anaerobic Culture Culture in progress    Narrative:      1) Perispinal    AFB Culture & Smear [[id number]] Collected: 01/24/23 0943    Order Status: Completed Specimen: Bone from Back Updated: 01/25/23 2127     AFB Culture & Smear Culture in progress     AFB CULTURE STAIN No acid fast bacilli seen.    Narrative:      4) Perispinal    AFB Culture & Smear [[id number]] Collected: 01/24/23 0943    Order Status: Completed Specimen: Bone from Back Updated: 01/25/23 2127     AFB Culture & Smear Culture in progress     AFB CULTURE STAIN No acid fast bacilli seen.    Narrative:      3) Perispinal    AFB Culture & Smear [[id number]] Collected: 01/24/23 0913    Order Status: Completed Specimen: Body Fluid from Back Updated: 01/25/23 2127     AFB Culture & Smear Culture in progress     AFB CULTURE STAIN No acid fast bacilli seen.    Narrative:      1) Perispinal    AFB Culture & Smear [[id number]] Collected: 01/24/23 0913    Order Status: Completed Specimen: Body Fluid from Back Updated: 01/25/23 2127     AFB Culture & Smear Culture in progress     AFB CULTURE STAIN No acid fast bacilli seen.    Narrative:      2) Perispinal    Blood culture [[id number]]  Collected: 01/20/23 1131    Order Status: Completed Specimen: Blood Updated: 01/25/23 1412     Blood Culture, Routine No growth after 5 days.    Narrative:      Collection has been rescheduled by [facility] at 01/20/2023 11:03 Reason:   Patient unavailable in room with doctors   Collection has been rescheduled by [facility] at 01/20/2023 11:03 Reason:   Patient unavailable in room with doctors     Blood culture [[id number]] Collected: 01/20/23 1131    Order Status: Completed Specimen: Blood Updated: 01/25/23 1412     Blood Culture, Routine No growth after 5 days.    Narrative:      Collection has been rescheduled by [facility] at 01/20/2023 11:03 Reason:   Patient unavailable in room with doctors   Collection has been rescheduled by [facility] at 01/20/2023 11:03 Reason:   Patient unavailable in room with doctors     Culture, Anaerobe [[id number]] Collected: 01/24/23 0913    Order Status: Completed Specimen: Body Fluid from Back Updated: 01/25/23 0658     Anaerobic Culture Culture in progress    Narrative:      2) Perispinal    Gram stain [[id number]] Collected: 01/24/23 0913    Order Status: Completed Specimen: Body Fluid from Back Updated: 01/24/23 1239     Gram Stain Result No WBC's      No organisms seen    Narrative:      1) Perispinal    Gram stain [[id number]] Collected: 01/24/23 0944    Order Status: Completed Specimen: Bone from Back Updated: 01/24/23 1238     Gram Stain Result Rare WBC's      No organisms seen    Narrative:      3) Perispinal    Gram stain [[id number]] Collected: 01/24/23 0913    Order Status: Completed Specimen: Body Fluid from Back Updated: 01/24/23 1237     Gram Stain Result Rare WBC's      No organisms seen    Narrative:      2) Perispinal    Gram stain [[id number]] Collected: 01/24/23 0943    Order Status: Completed Specimen: Bone from Back Updated: 01/24/23 1236     Gram Stain Result No WBC's      No organisms seen    Narrative:      4) Perispinal    Fungus culture [[id number]] Collected: 01/24/23 0943    Order  Status: Sent Specimen: Bone from Back Updated: 01/24/23 1009    Fungus culture [[id number]] Collected: 01/24/23 0943    Order Status: Sent Specimen: Bone from Back Updated: 01/24/23 1007    Fungus culture [[id number]] Collected: 01/24/23 0913    Order Status: Sent Specimen: Body Fluid from Back Updated: 01/24/23 0929    Fungus culture [[id number]] Collected: 01/24/23 0913    Order Status: Sent Specimen: Body Fluid from Back Updated: 01/24/23 0927              Diet  Diet Adult Regular (IDDSI Level 7)  Diet Adult Regular (IDDSI Level 7)

## 2023-01-29 NOTE — PLAN OF CARE
[facility] Care Plan    POC reviewed with Rachel Zazueta at 1500. Pt verbalized understanding. Questions and concerns addressed. No acute events today. Pt progressing toward goals. Will continue to monitor. See below and flowsheets for full assessment and VS info.             Is this a stroke patient? no    Neuro:  [place] Coma Scale  Best Eye Response: 4-->(E4) spontaneous  Best Motor Response: 6-->(M6) obeys commands  Best Verbal Response: 5-->(V5) oriented  [place] Coma Scale Score: 15  Assessment Qualifiers: patient not sedated/intubated  Pupil PERRLA: yes     24 hr Temp:  [97.9 °F (36.6 °C)-98.7 °F (37.1 °C)]     CV:   Rhythm: sinus tachycardia  BP goals:   SBP < 140  MAP > 65    Resp:      Oxygen Concentration (%): 24    Plan:  wean from nasal cannula    GI/:     Diet/Nutrition Received: regular  Last Bowel Movement: 01/29/23  Voiding Characteristics: external catheter    Intake/Output Summary (Last 24 hours) at 1/29/2023 1725  Last data filed at 1/29/2023 1701  Gross per 24 hour   Intake 531.91 ml   Output 1520 ml   Net -988.09 ml     Unmeasured Output  Urine Occurrence: 1  Stool Occurrence: 1  Pad Count: 2    Labs/Accuchecks:  Recent Labs   Lab 01/29/23  0830   WBC 3.58*   RBC 2.58*   HGB 7.5*   HCT 24.3*   PLT 91*      Recent Labs   Lab 01/29/23  0105   *   K 4.6   CO2 25      BUN 15   CREATININE 0.4*   ALKPHOS 105   ALT 31   AST 53*   BILITOT 1.5*      Recent Labs   Lab 01/25/23  0108   INR 1.4*   APTT 25.0    No results for input(s): CPK, CPKMB, TROPONINI, MB in the last 168 hours.    Electrolytes: Electrolytes replaced  Accuchecks: none    Gtts:   hydromorphone in 0.9 % NaCl 6 mg/30 ml         LDA/Wounds:  Lines/Drains/Airways       Peripherally Inserted Central Catheter Line  Duration             PICC Double Lumen 01/26/23 1209 left basilic 3 days              Drain  Duration                  Closed/Suction Drain 01/24/23 Posterior Back Accordion 10 Fr. 5 days         Closed/Suction Drain  01/24/23 Posterior;Right Back Accordion 10 Fr. 5 days    Female External Urinary Catheter 01/27/23 1115 2 days              Peripheral Intravenous Line  Duration                  Midline Catheter Insertion/Assessment  - Single Lumen 01/12/23 2138 Right basilic vein (medial side of arm) 18g x 10cm 16 days                  Wounds: Yes; spinal incision  Wound care consulted: No

## 2023-01-29 NOTE — PLAN OF CARE
[facility] Care Plan    POC reviewed with Rachel Zazueta at 1500. Pt verbalized understanding. Questions and concerns addressed. No acute events today. Pt progressing toward goals. Will continue to monitor. See below and flowsheets for full assessment and VS info.             Is this a stroke patient? no    Neuro:  [place] Coma Scale  Best Eye Response: 4-->(E4) spontaneous  Best Motor Response: 6-->(M6) obeys commands  Best Verbal Response: 5-->(V5) oriented  [place] Coma Scale Score: 15  Assessment Qualifiers: patient not sedated/intubated  Pupil PERRLA: yes     24 hr Temp:  [98.1 °F (36.7 °C)-98.6 °F (37 °C)]     CV:   Rhythm: sinus tachycardia  BP goals:   SBP < 140  MAP > 65    Resp:      Oxygen Concentration (%): 24    Plan:  wean from nasal cannula    GI/:     Diet/Nutrition Received: regular  Last Bowel Movement: 01/28/23  Voiding Characteristics: external catheter    Intake/Output Summary (Last 24 hours) at 1/28/2023 1926  Last data filed at 1/28/2023 1801  Gross per 24 hour   Intake 1257.28 ml   Output 1205 ml   Net 52.28 ml     Unmeasured Output  Urine Occurrence: 1  Stool Occurrence: 1  Pad Count: 1    Labs/Accuchecks:  Recent Labs   Lab 01/28/23  0832   WBC 5.22   RBC 2.56*   HGB 7.4*   HCT 23.3*   PLT 93*      Recent Labs   Lab 01/28/23  0425   *   K 4.1   CO2 25      BUN 16   CREATININE 0.5   ALKPHOS 96   ALT 29   AST 54*   BILITOT 2.3*      Recent Labs   Lab 01/25/23  0108   INR 1.4*   APTT 25.0    No results for input(s): CPK, CPKMB, TROPONINI, MB in the last 168 hours.    Electrolytes: Electrolytes replaced  Accuchecks: none    Gtts:   hydromorphone in 0.9 % NaCl 6 mg/30 ml         LDA/Wounds:  Lines/Drains/Airways       Peripherally Inserted Central Catheter Line  Duration             PICC Double Lumen 01/26/23 1209 left basilic 2 days              Drain  Duration                  Closed/Suction Drain 01/24/23 Posterior Back Accordion 10 Fr. 4 days         Closed/Suction Drain 01/24/23  Posterior;Right Back Accordion 10 Fr. 4 days    Female External Urinary Catheter 01/27/23 1115 1 day              Peripheral Intravenous Line  Duration                  Midline Catheter Insertion/Assessment  - Single Lumen 01/12/23 2138 Right basilic vein (medial side of arm) 18g x 10cm 15 days                  Wounds: Yes; spinal incision  Wound care consulted: No

## 2023-01-29 NOTE — ASSESSMENT & PLAN NOTE
43 yo F w/ history of multiple spinal surgeries presented to OSH with possible infection of patient's shoulder and found to have UTI and E. Coli bacteremia and progressively worse B/L LE weakness. [place] [facility] [place]. Underwent Laminectomy T8-T10; Posterior spinal fusion T8-T12; hardware removal and washout. Admitted to [facility] for HLOC. Arrived to unit on PCA dilaudid pump. Patient is HDS on 3L NC.   - Admit to NSCCU  - NSGY, ID following  - q2hr neuro checks, vitals, I/Os  - HV drains x 2 in place, management per NSGY  -Transfuse for Hg<7  - Cultures positive for ESBL, blood cultures with NGTD. WCTM  - Antibiotics per ID recs  - CMP, Mag, Phos, CBC daily  - MAP goals > 65, SBP < 180  - PRN labetalol, hydralazine  - MM pain regimen; PCA Dilaudid pump for pain management  - Patient will need to lie flat with HOB 15-20°   - Aggressive bowel regimen  - PT/OT as appropriate   - VTE prophylaxis: mechanical, hold chemical given pancytopenia

## 2023-01-29 NOTE — PROGRESS NOTES
[facility] - [facility] Care  Neurosurgery  Progress Note    Subjective:     History of Present Illness: Ms. Zazueta is a 42 y.o F w/ hx ofIV drug use (methamphetamine), chronic HCV with cirrhosis, spinal fusion (04/2021), epidural abscess with removal of hardware (02/2022), spinal fusion with hardware (T10 - Pelvis / 03/2022), obesity (BMI 39.3) and neurogenic bladder and recent shoulder surgery who initially presented to [facility] for evaluation of back pain and left leg weakness.  She reports initially noting the left leg weakness when she was about to go to the bathroom and was about to fall but was assisted by her boyfriend.  She was brought to the ED via EMS.  Urinalysis with UTI concerns and blood cultures at OSH grew ESBL E coli, and shoulder effusion concern for infection so she was treated with meropenem.  During hospitalization, she reports the weakness that started out in her left leg initially then spread upwards to her left thigh, left hip, then crossed over to the right hip and spread to her right leg.  Denies recent IV drug use. She reports her last incidence of drug use was more than 3 years ago and also denies tobacco, and alcohol abuse.  Reports cannabis use.     OSH course notable for concerning urinalysis and blood cultures positive for ESBL E coli treated with meropenem.  She was also admit to the ICU where she was treated with Precedex for significant body aches, irritability, and muscle spasms.  Concerns of a murmur on physical exam so she underwent TTE which did not show any vegetations.  Worsening lower extremity weakness workup with MRI head nonspecific, MRI cervical spine with multilevel spondylosis, X-ray spine with multilevel thoracolumbar fusion and diskectomy, focal kyphosis at T9-10 increased compared to prior.  She was started on prophylaxis amoxicillin due to her history of infected hardware.  MRI spine unable to be completed due to patient's body habitus so [place] [facility] [place] and [place], Neurology evaluation.      Post-Op Info:  Procedure(s) (LRB):  **AIRO** T8-T12 POSTERIOR FUSION, T8-T10 LAMINECTOMY, HARDWARE REMOVAL AND WASHOUT (N/A)   5 Days Post-Op     Interval History: 1/29: neuro stable. Drains in place. OR Thursday pending    Medications:  Continuous Infusions:   hydromorphone in 0.9 % NaCl 6 mg/30 ml       Scheduled Meds:   acetaminophen  1,000 mg Oral TID    buPROPion  300 mg Oral Daily    gabapentin  600 mg Oral TID    lactulose  20 g Oral TID    meropenem (MERREM) IVPB  2 g Intravenous Q8H    methocarbamoL  1,000 mg Oral QID    pantoprazole  40 mg Oral Daily    polyethylene glycol  17 g Oral BID    senna-docusate 8.6-50 mg  2 tablet Oral BID    sodium chloride 0.9%  10 mL Intravenous Q6H     PRN Meds:sodium chloride, sodium chloride, dextrose 10%, dextrose 10%, diazePAM, glucagon (human recombinant), glucose, glucose, hydrALAZINE, labetalol, magnesium hydroxide 400 mg/5 ml, magnesium oxide, magnesium oxide, melatonin, naloxone, ondansetron, oxyCODONE, polyethylene glycol, potassium bicarbonate, potassium bicarbonate, potassium bicarbonate, potassium, sodium phosphates, potassium, sodium phosphates, potassium, sodium phosphates, prochlorperazine, sodium chloride 0.9%, Flushing PICC Protocol **AND** sodium chloride 0.9% **AND** sodium chloride 0.9%     Review of Systems  Objective:     Weight: 104 kg (229 lb 4.5 oz)  Body mass index is 35.91 kg/m².  Vital Signs (Most Recent):  Temp: 98.7 °F (37.1 °C) (01/29/23 0701)  Pulse: 103 (01/29/23 0828)  Resp: 18 (01/29/23 0858)  BP: (!) 107/55 (01/29/23 0701)  SpO2: 100 % (01/29/23 0828) Vital Signs (24h Range):  Temp:  [98.1 °F (36.7 °C)-98.7 °F (37.1 °C)] 98.7 °F (37.1 °C)  Pulse:  [102-118] 103  Resp:  [10-30] 18  SpO2:  [94 %-100 %] 100 %  BP: ()/(49-64) 107/55                              Neurosurgery Physical Exam  General: well developed, well nourished, no distress.    Head: normocephalic, atraumatic  Neurologic: Alert and oriented. Thought content appropriate.  GCS: Motor: 6/Verbal: 5/Eyes: 4 GCS Total: 15  Mental Status: Awake, Alert, Oriented x 4  Language: No aphasia  Speech: No dysarthria  Cranial nerves: face symmetric, tongue midline, CN II-XII grossly intact.   Eyes: pupils equal, round, reactive to light with accommodation, EOMI, nystagmus.   Pulmonary: normal respirations, no signs of respiratory distress  Abdomen: soft, non-distended, not tender to palpation  Skin: Skin is warm, dry and intact.  Sensory: intact to light touch throughout     Motor Strength:Moves all extremities spontaneously with good tone.  Full strength upper and extremities. No abnormal movements seen.      Strength   Deltoids Triceps Biceps Wrist Extension Wrist Flexion Hand    Upper: R 5/5 5/5 5/5 5/5 5/5 5/5     L 5/5 5/5 5/5 5/5 5/5 5/5       Iliopsoas Quadriceps Knee  Flexion Tibialis  anterior Gastro- cnemius EHL   Lower: R 3/5 4+/5 4+/5 4/5 4-/5 4/5     L 3/5 4+/5 4+/5 4/5 4-/5 4/5      Reflexes:   DTR: 2+ symmetrically throughout.  Parker's: Negative.  Babinski's: Present bilaterally  Clonus: 2 beats bilateral lower extremity     Incision well healed       Significant Labs:  Recent Labs   Lab 01/27/23  1215 01/28/23  0425 01/29/23  0105   GLU  --  98 99   NA  --  132* 135*   K  --  4.1 4.6   CL  --  102 103   CO2  --  25 25   BUN  --  16 15   CREATININE  --  0.5 0.4*   CALCIUM  --  8.0* 7.6*   MG 1.6 1.6 1.7       Recent Labs   Lab 01/28/23  0832 01/28/23  2100 01/29/23  0830   WBC 5.22 5.03 3.58*   HGB 7.4* 7.4* 7.5*   HCT 23.3* 23.6* 24.3*   PLT 93* 99* 91*       No results for input(s): LABPT, INR, APTT in the last 48 hours.    Microbiology Results (last 7 days)       Procedure Component Value Units Date/Time    Blood culture [[id number]] Collected: 01/27/23 0120    Order Status: Completed Specimen: Blood from Peripheral, Forearm, Right Updated: 01/29/23 0613     Blood Culture,  Routine No Growth to date      No Growth to date      No Growth to date    Blood culture [[id number]] Collected: 01/27/23 0123    Order Status: Completed Specimen: Blood from Peripheral, Antecubital, Left Updated: 01/29/23 0613     Blood Culture, Routine No Growth to date      No Growth to date      No Growth to date    Aerobic culture [[id number]]  (Abnormal)  (Susceptibility) Collected: 01/24/23 0943    Order Status: Completed Specimen: Bone from Back Updated: 01/28/23 1145     Aerobic Bacterial Culture ESCHERICHIA COLI ESBL  From broth only      Narrative:      4) Perispinal    Aerobic culture [[id number]]  (Abnormal)  (Susceptibility) Collected: 01/24/23 0914    Order Status: Completed Specimen: Wound from Back Updated: 01/26/23 1306     Aerobic Bacterial Culture ESCHERICHIA COLI  Rare      Narrative:      1) Perispinal    Aerobic culture [[id number]]  (Abnormal)  (Susceptibility) Collected: 01/24/23 0914    Order Status: Completed Specimen: Wound from Back Updated: 01/26/23 1306     Aerobic Bacterial Culture ESCHERICHIA COLI ESBL  Few      Narrative:      2) Perispinal    Aerobic culture [[id number]]  (Abnormal)  (Susceptibility) Collected: 01/24/23 0943    Order Status: Completed Specimen: Bone from Back Updated: 01/26/23 1252     Aerobic Bacterial Culture ESCHERICHIA COLI ESBL  Few      Narrative:      3) Perispinal    Culture, Anaerobe [[id number]] Collected: 01/24/23 0943    Order Status: Completed Specimen: Bone from Back Updated: 01/26/23 1238     Anaerobic Culture Culture in progress    Narrative:      4) Perispinal    Culture, Anaerobe [[id number]] Collected: 01/24/23 0943    Order Status: Completed Specimen: Bone from Back Updated: 01/26/23 1237     Anaerobic Culture Culture in progress    Narrative:      3) Perispinal    Culture, Anaerobe [[id number]] Collected: 01/24/23 0913    Order Status: Completed Specimen: Body Fluid from Back Updated: 01/26/23 1237     Anaerobic Culture Culture in progress     Narrative:      1) Perispinal    AFB Culture & Smear [[id number]] Collected: 01/24/23 0943    Order Status: Completed Specimen: Bone from Back Updated: 01/25/23 2127     AFB Culture & Smear Culture in progress     AFB CULTURE STAIN No acid fast bacilli seen.    Narrative:      4) Perispinal    AFB Culture & Smear [[id number]] Collected: 01/24/23 0943    Order Status: Completed Specimen: Bone from Back Updated: 01/25/23 2127     AFB Culture & Smear Culture in progress     AFB CULTURE STAIN No acid fast bacilli seen.    Narrative:      3) Perispinal    AFB Culture & Smear [[id number]] Collected: 01/24/23 0913    Order Status: Completed Specimen: Body Fluid from Back Updated: 01/25/23 2127     AFB Culture & Smear Culture in progress     AFB CULTURE STAIN No acid fast bacilli seen.    Narrative:      1) Perispinal    AFB Culture & Smear [[id number]] Collected: 01/24/23 0913    Order Status: Completed Specimen: Body Fluid from Back Updated: 01/25/23 2127     AFB Culture & Smear Culture in progress     AFB CULTURE STAIN No acid fast bacilli seen.    Narrative:      2) Perispinal    Blood culture [[id number]] Collected: 01/20/23 1131    Order Status: Completed Specimen: Blood Updated: 01/25/23 1412     Blood Culture, Routine No growth after 5 days.    Narrative:      Collection has been rescheduled by [facility] at 01/20/2023 11:03 Reason:   Patient unavailable in room with doctors   Collection has been rescheduled by 2 at 01/20/2023 11:03 Reason:   Patient unavailable in room with doctors     Blood culture [[id number]] Collected: 01/20/23 1131    Order Status: Completed Specimen: Blood Updated: 01/25/23 1412     Blood Culture, Routine No growth after 5 days.    Narrative:      Collection has been rescheduled by 2 at 01/20/2023 11:03 Reason:   Patient unavailable in room with doctors   Collection has been rescheduled by 2 at 01/20/2023 11:03 Reason:   Patient unavailable in room with doctors     Culture, Anaerobe [[id number]]  Collected: 01/24/23 0913    Order Status: Completed Specimen: Body Fluid from Back Updated: 01/25/23 0658     Anaerobic Culture Culture in progress    Narrative:      2) Perispinal    Gram stain [[id number]] Collected: 01/24/23 0913    Order Status: Completed Specimen: Body Fluid from Back Updated: 01/24/23 1239     Gram Stain Result No WBC's      No organisms seen    Narrative:      1) Perispinal    Gram stain [[id number]] Collected: 01/24/23 0944    Order Status: Completed Specimen: Bone from Back Updated: 01/24/23 1238     Gram Stain Result Rare WBC's      No organisms seen    Narrative:      3) Perispinal    Gram stain [[id number]] Collected: 01/24/23 0913    Order Status: Completed Specimen: Body Fluid from Back Updated: 01/24/23 1237     Gram Stain Result Rare WBC's      No organisms seen    Narrative:      2) Perispinal    Gram stain [[id number]] Collected: 01/24/23 0943    Order Status: Completed Specimen: Bone from Back Updated: 01/24/23 1236     Gram Stain Result No WBC's      No organisms seen    Narrative:      4) Perispinal    Fungus culture [[id number]] Collected: 01/24/23 0943    Order Status: Sent Specimen: Bone from Back Updated: 01/24/23 1009    Fungus culture [[id number]] Collected: 01/24/23 0943    Order Status: Sent Specimen: Bone from Back Updated: 01/24/23 1007    Fungus culture [[id number]] Collected: 01/24/23 0913    Order Status: Sent Specimen: Body Fluid from Back Updated: 01/24/23 0929    Fungus culture [[id number]] Collected: 01/24/23 0913    Order Status: Sent Specimen: Body Fluid from Back Updated: 01/24/23 0927          All pertinent labs from the last 24 hours have been reviewed.    Significant Diagnostics:  CT C-spine:  Vertebrae: The dens, lateral masses, and occipital condyles are intact.  There is no evidence of fracture or dislocation.     Discs: Multilevel disc height loss and degenerative endplate change.  Prominent C6 inferior endplate Schmorl's node, similar in appearance dating  back to MRI from 02/14/2022.     Degenerative changes: Sent spinal canal dimensions at C3-C4 are 10.5 mm, 8.5 mm at C4-C5, 10.0 mm at C5-C6, and 10.8 mm at C6-C7 .  Multilevel uncovertebral spurring with multilevel neural foraminal narrowing most pronounced at C5-C6 with moderate right neural foraminal narrowing and C6-C7 with moderate left neural foraminal narrowing.    Physical Exam  Neurosurgery Physical Exam    Assessment/Plan:     * Weakness of both lower extremities  Pt is 42F multiple spinal surgeries and revisions last T10- Pelvis in March 2022 who presented to OSH with concern for shoulder infection and UTI found to have E coli sepsis who has had progressive worsening BLE weakness, XR showed possible worsening proximal junctional kyphotic deformity. Transferred to [facility] [place]  and neurosurgery was consulted    OR yesterday for temporary T6-12 PSIF. Taz pus noted intraoperatively.   Given intraoperative findings, new operative plan for T4-pelvis revision and extension of fusion with T9-10 corpectomy with plastic surgery assistance planned for 2/2/23    Plan:  Admitted to ICU, Keep in ICU on logroll precautions until stage 2 Thursday.    -- MRI C/T/L spine 1/20 with C7 SP enhancement, focal kyphosis at T8-10 with severe anterior height loss at T9, enhancing C6 inferior endplate Schmorl's node  -- CT T/L spine thin cut 1/20 with haloing of pelvic and T10 screws, spondylodiscitis T8-9, T9-10  -- CT C spine with concern for discitis at C6-7  -- flex ext XR done, limited view of C6-7 in swimmers view 2/2 shoulders  -- 1/20 ESR 70, CRP 9.4. elevated from prior   -- TLSO at bedside  --HV x2 to gravity, WV in place, monitor ouput  --ID: Vanc/merropenam   -- BCx 1/20: NGTD   -- OR cultures 1/24: E. coli  --OR 2/2/23 as above   -- will obtain informed consent   -- NPO 2/1/23 midnight  -- multimodal pain control per primary medicine team  NSGY will continue to follow. Please contact team with any further  questions.        Emily Hercules MD  Neurosurgery  [facility] - Neuro Critical Care

## 2023-01-29 NOTE — PLAN OF CARE
[facility] Care Plan    POC reviewed with Rachel Zazueta and family at 0500. Pt verbalized understanding. Questions and concerns addressed. No acute events overnight. Pt progressing toward goals. Will continue to monitor. See below and flowsheets for full assessment and VS info.           Is this a stroke patient? yes- Stroke booklet reviewed with patient and family, risk factors identified for patient and stroke booklet remains at bedside for ongoing education.     Neuro:  [place] Coma Scale  Best Eye Response: 4-->(E4) spontaneous  Best Motor Response: 6-->(M6) obeys commands  Best Verbal Response: 5-->(V5) oriented  [place] Coma Scale Score: 15  Assessment Qualifiers: patient not sedated/intubated, no eye obstruction present  Pupil PERRLA: yes     24hr Temp:  [98.1 °F (36.7 °C)-98.6 °F (37 °C)]     CV:   Rhythm: sinus tachycardia  BP goals:   SBP < 140  MAP > 65    Resp:      Oxygen Concentration (%): 24    Plan: N/A    GI/:     Diet/Nutrition Received: regular  Last Bowel Movement: 01/28/23  Voiding Characteristics: external catheter    Intake/Output Summary (Last 24 hours) at 1/29/2023 0709  Last data filed at 1/29/2023 0601  Gross per 24 hour   Intake 767.59 ml   Output 1880 ml   Net -1112.41 ml     Unmeasured Output  Urine Occurrence: 1  Stool Occurrence: 1  Pad Count: 1    Labs/Accuchecks:  Recent Labs   Lab 01/28/23  2100   WBC 5.03   RBC 2.55*   HGB 7.4*   HCT 23.6*   PLT 99*      Recent Labs   Lab 01/29/23  0105   *   K 4.6   CO2 25      BUN 15   CREATININE 0.4*   ALKPHOS 105   ALT 31   AST 53*   BILITOT 1.5*      Recent Labs   Lab 01/25/23  0108   INR 1.4*   APTT 25.0    No results for input(s): CPK, CPKMB, TROPONINI, MB in the last 168 hours.    Electrolytes: Electrolytes replaced  Accuchecks: none    Gtts:   hydromorphone in 0.9 % NaCl 6 mg/30 ml         LDA/Wounds:  Lines/Drains/Airways       Peripherally Inserted Central Catheter Line  Duration             PICC Double Lumen 01/26/23 1209  left basilic 2 days              Drain  Duration                  Closed/Suction Drain 01/24/23 Posterior Back Accordion 10 Fr. 5 days         Closed/Suction Drain 01/24/23 Posterior;Right Back Accordion 10 Fr. 5 days    Female External Urinary Catheter 01/27/23 1115 1 day              Peripheral Intravenous Line  Duration                  Midline Catheter Insertion/Assessment  - Single Lumen 01/12/23 2138 Right basilic vein (medial side of arm) 18g x 10cm 16 days                  Wounds: Yes - surgical incision  Wound care consulted: No

## 2023-01-29 NOTE — ASSESSMENT & PLAN NOTE
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  ID following  Blood cx (1/29) NGTD  Intra-operative cultures with E.coli ESBL  Continue Vancomycin and meropenem  Trend CBC daily

## 2023-01-30 LAB
ALBUMIN SERPL BCP-MCNC: 1.8 G/DL (ref 3.5–5.2)
ALP SERPL-CCNC: 113 U/L (ref 55–135)
ALT SERPL W/O P-5'-P-CCNC: 31 U/L (ref 10–44)
ANION GAP SERPL CALC-SCNC: 5 MMOL/L (ref 8–16)
AST SERPL-CCNC: 57 U/L (ref 10–40)
BACTERIA SPEC ANAEROBE CULT: NORMAL
BASOPHILS # BLD AUTO: 0.02 K/UL (ref 0–0.2)
BASOPHILS NFR BLD: 0.7 % (ref 0–1.9)
BILIRUB SERPL-MCNC: 1.4 MG/DL (ref 0.1–1)
BUN SERPL-MCNC: 17 MG/DL (ref 6–20)
CALCIUM SERPL-MCNC: 7.8 MG/DL (ref 8.7–10.5)
CHLORIDE SERPL-SCNC: 102 MMOL/L (ref 95–110)
CO2 SERPL-SCNC: 26 MMOL/L (ref 23–29)
CREAT SERPL-MCNC: 0.5 MG/DL (ref 0.5–1.4)
DIFFERENTIAL METHOD: ABNORMAL
EOSINOPHIL # BLD AUTO: 0.1 K/UL (ref 0–0.5)
EOSINOPHIL NFR BLD: 3.8 % (ref 0–8)
ERYTHROCYTE [DISTWIDTH] IN BLOOD BY AUTOMATED COUNT: 20 % (ref 11.5–14.5)
EST. GFR  (NO RACE VARIABLE): >60 ML/MIN/1.73 M^2
FINAL PATHOLOGIC DIAGNOSIS: NORMAL
GLUCOSE SERPL-MCNC: 99 MG/DL (ref 70–110)
GROSS: NORMAL
HCT VFR BLD AUTO: 24.4 % (ref 37–48.5)
HGB BLD-MCNC: 7.3 G/DL (ref 12–16)
IMM GRANULOCYTES # BLD AUTO: 0.03 K/UL (ref 0–0.04)
IMM GRANULOCYTES NFR BLD AUTO: 1 % (ref 0–0.5)
LYMPHOCYTES # BLD AUTO: 0.6 K/UL (ref 1–4.8)
LYMPHOCYTES NFR BLD: 19.3 % (ref 18–48)
Lab: NORMAL
MAGNESIUM SERPL-MCNC: 1.9 MG/DL (ref 1.6–2.6)
MCH RBC QN AUTO: 28.1 PG (ref 27–31)
MCHC RBC AUTO-ENTMCNC: 29.9 G/DL (ref 32–36)
MCV RBC AUTO: 94 FL (ref 82–98)
MONOCYTES # BLD AUTO: 0.3 K/UL (ref 0.3–1)
MONOCYTES NFR BLD: 11.4 % (ref 4–15)
NEUTROPHILS # BLD AUTO: 1.9 K/UL (ref 1.8–7.7)
NEUTROPHILS NFR BLD: 63.8 % (ref 38–73)
NRBC BLD-RTO: 0 /100 WBC
PHOSPHATE SERPL-MCNC: 2.9 MG/DL (ref 2.7–4.5)
PLATELET # BLD AUTO: 80 K/UL (ref 150–450)
PMV BLD AUTO: 10 FL (ref 9.2–12.9)
POTASSIUM SERPL-SCNC: 4.2 MMOL/L (ref 3.5–5.1)
PROT SERPL-MCNC: 4.9 G/DL (ref 6–8.4)
RBC # BLD AUTO: 2.6 M/UL (ref 4–5.4)
SODIUM SERPL-SCNC: 133 MMOL/L (ref 136–145)
WBC # BLD AUTO: 2.9 K/UL (ref 3.9–12.7)

## 2023-01-30 PROCEDURE — 99233 SBSQ HOSP IP/OBS HIGH 50: CPT | Mod: ,,, | Performed by: PSYCHIATRY & NEUROLOGY

## 2023-01-30 PROCEDURE — 63600175 PHARM REV CODE 636 W HCPCS

## 2023-01-30 PROCEDURE — 63600175 PHARM REV CODE 636 W HCPCS: Performed by: STUDENT IN AN ORGANIZED HEALTH CARE EDUCATION/TRAINING PROGRAM

## 2023-01-30 PROCEDURE — 27000221 HC OXYGEN, UP TO 24 HOURS

## 2023-01-30 PROCEDURE — 99233 PR SUBSEQUENT HOSPITAL CARE,LEVL III: ICD-10-PCS | Mod: ,,, | Performed by: INTERNAL MEDICINE

## 2023-01-30 PROCEDURE — 94799 UNLISTED PULMONARY SVC/PX: CPT

## 2023-01-30 PROCEDURE — 85025 COMPLETE CBC W/AUTO DIFF WBC: CPT

## 2023-01-30 PROCEDURE — 99233 SBSQ HOSP IP/OBS HIGH 50: CPT | Mod: ,,, | Performed by: INTERNAL MEDICINE

## 2023-01-30 PROCEDURE — 83735 ASSAY OF MAGNESIUM: CPT | Performed by: STUDENT IN AN ORGANIZED HEALTH CARE EDUCATION/TRAINING PROGRAM

## 2023-01-30 PROCEDURE — 25000003 PHARM REV CODE 250: Performed by: STUDENT IN AN ORGANIZED HEALTH CARE EDUCATION/TRAINING PROGRAM

## 2023-01-30 PROCEDURE — 20000000 HC ICU ROOM

## 2023-01-30 PROCEDURE — 99900035 HC TECH TIME PER 15 MIN (STAT)

## 2023-01-30 PROCEDURE — 25000003 PHARM REV CODE 250: Performed by: INTERNAL MEDICINE

## 2023-01-30 PROCEDURE — A4216 STERILE WATER/SALINE, 10 ML: HCPCS | Performed by: PSYCHIATRY & NEUROLOGY

## 2023-01-30 PROCEDURE — 99233 PR SUBSEQUENT HOSPITAL CARE,LEVL III: ICD-10-PCS | Mod: ,,, | Performed by: PSYCHIATRY & NEUROLOGY

## 2023-01-30 PROCEDURE — 27000207 HC ISOLATION

## 2023-01-30 PROCEDURE — 84100 ASSAY OF PHOSPHORUS: CPT | Performed by: STUDENT IN AN ORGANIZED HEALTH CARE EDUCATION/TRAINING PROGRAM

## 2023-01-30 PROCEDURE — 80053 COMPREHEN METABOLIC PANEL: CPT | Performed by: STUDENT IN AN ORGANIZED HEALTH CARE EDUCATION/TRAINING PROGRAM

## 2023-01-30 PROCEDURE — 25000003 PHARM REV CODE 250

## 2023-01-30 PROCEDURE — 25000003 PHARM REV CODE 250: Performed by: PSYCHIATRY & NEUROLOGY

## 2023-01-30 PROCEDURE — 94761 N-INVAS EAR/PLS OXIMETRY MLT: CPT

## 2023-01-30 RX ORDER — OXYCODONE HYDROCHLORIDE 5 MG/1
5 TABLET ORAL
Status: DISCONTINUED | OUTPATIENT
Start: 2023-01-30 | End: 2023-01-31

## 2023-01-30 RX ADMIN — Medication 10 ML: at 12:01

## 2023-01-30 RX ADMIN — GABAPENTIN 600 MG: 300 CAPSULE ORAL at 04:01

## 2023-01-30 RX ADMIN — ACETAMINOPHEN 1000 MG: 500 TABLET ORAL at 08:01

## 2023-01-30 RX ADMIN — METHOCARBAMOL 1000 MG: 500 TABLET ORAL at 08:01

## 2023-01-30 RX ADMIN — Medication 10 ML: at 06:01

## 2023-01-30 RX ADMIN — DIAZEPAM 5 MG: 5 TABLET ORAL at 08:01

## 2023-01-30 RX ADMIN — OXYCODONE HYDROCHLORIDE 5 MG: 5 TABLET ORAL at 08:01

## 2023-01-30 RX ADMIN — Medication: at 02:01

## 2023-01-30 RX ADMIN — MEROPENEM 2 G: 1 INJECTION INTRAVENOUS at 08:01

## 2023-01-30 RX ADMIN — Medication 6 MG: at 02:01

## 2023-01-30 RX ADMIN — OXYCODONE HYDROCHLORIDE 5 MG: 5 TABLET ORAL at 02:01

## 2023-01-30 RX ADMIN — MEROPENEM 2 G: 1 INJECTION INTRAVENOUS at 11:01

## 2023-01-30 RX ADMIN — OXYCODONE HYDROCHLORIDE 5 MG: 5 TABLET ORAL at 11:01

## 2023-01-30 RX ADMIN — OXYCODONE HYDROCHLORIDE 5 MG: 5 TABLET ORAL at 04:01

## 2023-01-30 RX ADMIN — Medication 10 ML: at 05:01

## 2023-01-30 RX ADMIN — METHOCARBAMOL 1000 MG: 500 TABLET ORAL at 01:01

## 2023-01-30 RX ADMIN — Medication: at 08:01

## 2023-01-30 RX ADMIN — PANTOPRAZOLE SODIUM 40 MG: 40 TABLET, DELAYED RELEASE ORAL at 08:01

## 2023-01-30 RX ADMIN — MEROPENEM 2 G: 1 INJECTION INTRAVENOUS at 04:01

## 2023-01-30 RX ADMIN — Medication: at 05:01

## 2023-01-30 RX ADMIN — GABAPENTIN 600 MG: 300 CAPSULE ORAL at 08:01

## 2023-01-30 RX ADMIN — ACETAMINOPHEN 1000 MG: 500 TABLET ORAL at 04:01

## 2023-01-30 RX ADMIN — OXYCODONE HYDROCHLORIDE 5 MG: 5 TABLET ORAL at 12:01

## 2023-01-30 RX ADMIN — BUPROPION HYDROCHLORIDE 300 MG: 300 TABLET, FILM COATED, EXTENDED RELEASE ORAL at 09:01

## 2023-01-30 NOTE — PROGRESS NOTES
[facility] - Neuro Critical Care  Neurocritical Care  Progress Note    Admit Date: 1/19/2023  Service Date: 01/30/2023  Length of Stay: 11    Subjective:     Chief Complaint: Weakness of both lower extremities    History of Present Illness: 42-year-old female with a history of IV drug use, chronic hep C with cirrhosis, spinal fusion in April 2021, complicated by epidural abscess with removal of hardware February 2022 and against spinal fusion in March 2022 of T10 through the pelvis as well as neurogenic bladder who initially presented to [facility] in late December for generalized weakness, fatigue and bilateral flank pain.  Her course at [facility] was complicated by a E coli bacteremia thought to be due to a urinary source, she is completed a 7 day course of meropenem on 1/3, and had bcx from 1/2 that did not grow any bacteria.       She also had an ICU admission for ongoing back spasms and agitation requiring Precedex.  In early January patient started developing progressive lower extremity weakness greater in the left compared to the right.  She also had an MRI at the outside hospital that showed some white matter changes concerning for possible demyelinating disease.  However her thoracic spine radiographs also showed worsening of her kyphosis at T9-T10.  Due to the concern for either a demyelinating polyneuropathy, MS, or spinal cord compression the patient was transferred to [facility] for neurosurgical evaluation.       On admission the patient had a CT lumbar and thoracic spine and an MRI CTL spine. The MRI showed  focal kyphosis T8 through T10, with possible associated cord edema signal.  As well as extensive edema throughout the posterior paraspinal musculature concerning for possible infectious or inflammatory process.  There was concern that the findings at T8 through T10 could correspond with compressive myelopathy.  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  There were also erosive changes involving the sacroiliac joints bilaterally which was concerning for sacroiliitis and could be infectious.     Patient underwent laminectomy T8-T10; posterior fusion T8-T12; and washout by NSGY today. No complications noted during surgery. Patient is HDS and on 3L NC. Patient is currently on a PCA dilaudid pump.            Hospital Course: 1/25/2023 NAEO, pain well controlled on current regimen. Continue ABX. Maintain logroll precautions and HOB <30 until second stage of surgery.  01/26/2023 Hemoglobin 6.6, repeat 6.2. Increased drainage noted from bilateral hemovac. S/p 1u pRBC. PICC team consulted for long term access for antibiotics, remove IJ central line when PICC placed.   01/27/2023: NAEON. HV to gravity with decreased drainge. Hgb 7.2 this AM, will trend CBC q12h. Lytes replaced. Bowel regimen adjusted.   01/28/2023: NAEON. Received 1 unit PBRC yesterday. Hgb 7.4 this AM. Plan for OR 2/2.   01/29/2023: NAEON. HV drainage decreasing. Hgb stable. Plan for OR 2/2  01/30/2023  Stable exam. Significant solid BM's      Interval History:  See hospital course above.     Review of Systems   Constitutional:  Negative for fever.   HENT:  Negative for congestion and rhinorrhea.    Eyes:  Negative for visual disturbance.   Respiratory:  Negative for shortness of breath.    Cardiovascular:  Negative for chest pain.   Gastrointestinal:  Negative for abdominal distention, abdominal pain, nausea and vomiting.   Genitourinary:  Negative for dysuria and hematuria.   Musculoskeletal:  Positive for back pain. Negative for neck pain.   Neurological:  Positive for weakness. Negative for dizziness, seizures, light-headedness, numbness and headaches.     Objective:     Vitals:  Temp: 98.4 °F (36.9 °C)  Pulse: (!) 114  Rhythm: sinus tachycardia  BP: (!) 114/56  MAP (mmHg): 81  Resp: (!) 24  ETCO2 (mmHg): 43 mmHg  SpO2: 97  %    Temp  Min: 97.9 °F (36.6 °C)  Max: 99 °F (37.2 °C)  Pulse  Min: 104  Max: 125  BP  Min: 95/63  Max: 133/60  MAP (mmHg)  Min: 69  Max: 89  Resp  Min: 10  Max: 28  ETCO2 (mmHg)  Min: 30 mmHg  Max: 45 mmHg  SpO2  Min: 95 %  Max: 100 %    01/29 0701 - 01/30 0700  In: 651.2 [P.O.:250; I.V.:105]  Out: 1105 [Urine:870; Drains:235]   Unmeasured Output  Urine Occurrence: 1  Stool Occurrence: 1  Pad Count: 3       Physical Exam  Vitals and nursing note reviewed.   Constitutional:       General: She is not in acute distress.     Appearance: She is obese. She is not ill-appearing, toxic-appearing or diaphoretic.   HENT:      Head: Normocephalic.      Mouth/Throat:      Mouth: Mucous membranes are moist.   Eyes:      General: No scleral icterus.        Right eye: No discharge.         Left eye: No discharge.   Cardiovascular:      Rate and Rhythm: Normal rate and regular rhythm.   Pulmonary:      Effort: Pulmonary effort is normal. No respiratory distress.      Comments: NC in place  Abdominal:      General: Abdomen is flat.      Palpations: Abdomen is soft.      Tenderness: There is no abdominal tenderness.   Musculoskeletal:         General: No deformity.      Cervical back: No rigidity.   Skin:     General: Skin is warm and dry.      Coloration: Skin is not jaundiced.   Neurological:      Mental Status: She is alert and oriented to person, place, and time. Mental status is at baseline.      Motor: Weakness (Bilateral LE) and tremor (Bilateral UE) present.      Comments:   E4 V5 M6  Awake, alert, and oriented to self, time, place, and situation. Speech fluent. Answering all questions appropriately and following all commands briskly.   PERRL. Extraocular movements grossly intact. No facial asymmetry. Conversational hearing intact.   FOWLER spontaneously and follows commands. Strength 4/5 in BUE & 3/5 in BLE. Sensation intact to light touch in all 4 extremities.    Psychiatric:         Mood and Affect: Mood normal.          Behavior: Behavior normal.     Gait & coordination exams deferred.      Medications:  Continuoushydromorphone in 0.9 % NaCl 6 mg/30 ml  Scheduledacetaminophen, 1,000 mg, TID  buPROPion, 300 mg, Daily  gabapentin, 600 mg, TID  lactulose, 20 g, TID  meropenem (MERREM) IVPB, 2 g, Q8H  methocarbamoL, 1,000 mg, QID  pantoprazole, 40 mg, Daily  polyethylene glycol, 17 g, BID  senna-docusate 8.6-50 mg, 2 tablet, BID  sodium chloride 0.9%, 10 mL, Q6H  PRNdextrose 10%, 12.5 g, PRN  dextrose 10%, 25 g, PRN  diazePAM, 5 mg, Q6H PRN  glucagon (human recombinant), 1 mg, PRN  glucose, 16 g, PRN  glucose, 24 g, PRN  hydrALAZINE, 10 mg, Q6H PRN  labetalol, 10 mg, Q4H PRN  magnesium hydroxide 400 mg/5 ml, 30 mL, Daily PRN  magnesium oxide, 800 mg, PRN  magnesium oxide, 800 mg, PRN  melatonin, 6 mg, Nightly PRN  naloxone, 0.02 mg, PRN  ondansetron, 4 mg, Q6H PRN  oxyCODONE, 5 mg, Q3H PRN  polyethylene glycol, 17 g, Daily PRN  potassium bicarbonate, 35 mEq, PRN  potassium bicarbonate, 50 mEq, PRN  potassium bicarbonate, 60 mEq, PRN  potassium, sodium phosphates, 2 packet, PRN  potassium, sodium phosphates, 2 packet, PRN  potassium, sodium phosphates, 2 packet, PRN  prochlorperazine, 2.5 mg, Q6H PRN  sodium chloride 0.9%, 10 mL, Q12H PRN  sodium chloride 0.9%, 10 mL, PRN    Today I personally reviewed pertinent medications, lines/drains/airways, laboratory results, microbiology results, notably:      Laboratory:  CBC:  Recent Labs   Lab 01/30/23  0243   WBC 2.90*   RBC 2.60*   HGB 7.3*   HCT 24.4*   PLT 80*   MCV 94   MCH 28.1   MCHC 29.9*         CMP:  Recent Labs   Lab 01/30/23  0243   CALCIUM 7.8*   PROT 4.9*   *   K 4.2   CO2 26      BUN 17   CREATININE 0.5   ALKPHOS 113   ALT 31   AST 57*   BILITOT 1.4*       Microbiology:  Microbiology Results (last 7 days)       Procedure Component Value Units Date/Time    Culture, Anaerobe [[id number]] Collected: 01/24/23 0943    Order Status: Completed Specimen: Bone from Back  Updated: 01/30/23 0943     Anaerobic Culture No anaerobes isolated    Narrative:      4) Perispinal    Culture, Anaerobe [[id number]] Collected: 01/24/23 0943    Order Status: Completed Specimen: Bone from Back Updated: 01/30/23 0942     Anaerobic Culture No anaerobes isolated    Narrative:      3) Perispinal    Culture, Anaerobe [[id number]] Collected: 01/24/23 0913    Order Status: Completed Specimen: Body Fluid from Back Updated: 01/30/23 0942     Anaerobic Culture No anaerobes isolated    Narrative:      2) Perispinal    Culture, Anaerobe [[id number]] Collected: 01/24/23 0913    Order Status: Completed Specimen: Body Fluid from Back Updated: 01/30/23 0859     Anaerobic Culture No anaerobes isolated    Narrative:      1) Perispinal    Blood culture [[id number]] Collected: 01/27/23 0120    Order Status: Completed Specimen: Blood from Peripheral, Forearm, Right Updated: 01/30/23 0612     Blood Culture, Routine No Growth to date      No Growth to date      No Growth to date      No Growth to date    Blood culture [[id number]] Collected: 01/27/23 0123    Order Status: Completed Specimen: Blood from Peripheral, Antecubital, Left Updated: 01/30/23 0612     Blood Culture, Routine No Growth to date      No Growth to date      No Growth to date      No Growth to date    Aerobic culture [[id number]]  (Abnormal)  (Susceptibility) Collected: 01/24/23 0943    Order Status: Completed Specimen: Bone from Back Updated: 01/28/23 1145     Aerobic Bacterial Culture ESCHERICHIA COLI ESBL  From broth only      Narrative:      4) Perispinal    Aerobic culture [[id number]]  (Abnormal)  (Susceptibility) Collected: 01/24/23 0914    Order Status: Completed Specimen: Wound from Back Updated: 01/26/23 1306     Aerobic Bacterial Culture ESCHERICHIA COLI  Rare      Narrative:      1) Perispinal    Aerobic culture [[id number]]  (Abnormal)  (Susceptibility) Collected: 01/24/23 0914    Order Status: Completed Specimen: Wound from Back Updated: 01/26/23  1306     Aerobic Bacterial Culture ESCHERICHIA COLI ESBL  Few      Narrative:      2) Perispinal    Aerobic culture [[id number]]  (Abnormal)  (Susceptibility) Collected: 01/24/23 0943    Order Status: Completed Specimen: Bone from Back Updated: 01/26/23 1252     Aerobic Bacterial Culture ESCHERICHIA COLI ESBL  Few      Narrative:      3) Perispinal    AFB Culture & Smear [[id number]] Collected: 01/24/23 0943    Order Status: Completed Specimen: Bone from Back Updated: 01/25/23 2127     AFB Culture & Smear Culture in progress     AFB CULTURE STAIN No acid fast bacilli seen.    Narrative:      4) Perispinal    AFB Culture & Smear [[id number]] Collected: 01/24/23 0943    Order Status: Completed Specimen: Bone from Back Updated: 01/25/23 2127     AFB Culture & Smear Culture in progress     AFB CULTURE STAIN No acid fast bacilli seen.    Narrative:      3) Perispinal    AFB Culture & Smear [[id number]] Collected: 01/24/23 0913    Order Status: Completed Specimen: Body Fluid from Back Updated: 01/25/23 2127     AFB Culture & Smear Culture in progress     AFB CULTURE STAIN No acid fast bacilli seen.    Narrative:      1) Perispinal    AFB Culture & Smear [[id number]] Collected: 01/24/23 0913    Order Status: Completed Specimen: Body Fluid from Back Updated: 01/25/23 2127     AFB Culture & Smear Culture in progress     AFB CULTURE STAIN No acid fast bacilli seen.    Narrative:      2) Perispinal    Blood culture [[id number]] Collected: 01/20/23 1131    Order Status: Completed Specimen: Blood Updated: 01/25/23 1412     Blood Culture, Routine No growth after 5 days.    Narrative:      Collection has been rescheduled by [facility] at 01/20/2023 11:03 Reason:   Patient unavailable in room with doctors   Collection has been rescheduled by 2 at 01/20/2023 11:03 Reason:   Patient unavailable in room with doctors     Blood culture [[id number]] Collected: 01/20/23 1131    Order Status: Completed Specimen: Blood Updated: 01/25/23 1412     Blood  Culture, Routine No growth after 5 days.    Narrative:      Collection has been rescheduled by [facility] at 01/20/2023 11:03 Reason:   Patient unavailable in room with doctors   Collection has been rescheduled by [facility] at 01/20/2023 11:03 Reason:   Patient unavailable in room with doctors     Gram stain [[id number]] Collected: 01/24/23 0913    Order Status: Completed Specimen: Body Fluid from Back Updated: 01/24/23 1239     Gram Stain Result No WBC's      No organisms seen    Narrative:      1) Perispinal    Gram stain [[id number]] Collected: 01/24/23 0944    Order Status: Completed Specimen: Bone from Back Updated: 01/24/23 1238     Gram Stain Result Rare WBC's      No organisms seen    Narrative:      3) Perispinal    Gram stain [[id number]] Collected: 01/24/23 0913    Order Status: Completed Specimen: Body Fluid from Back Updated: 01/24/23 1237     Gram Stain Result Rare WBC's      No organisms seen    Narrative:      2) Perispinal    Gram stain [[id number]] Collected: 01/24/23 0943    Order Status: Completed Specimen: Bone from Back Updated: 01/24/23 1236     Gram Stain Result No WBC's      No organisms seen    Narrative:      4) Perispinal    Fungus culture [[id number]] Collected: 01/24/23 0943    Order Status: Sent Specimen: Bone from Back Updated: 01/24/23 1009    Fungus culture [[id number]] Collected: 01/24/23 0943    Order Status: Sent Specimen: Bone from Back Updated: 01/24/23 1007    Fungus culture [[id number]] Collected: 01/24/23 0913    Order Status: Sent Specimen: Body Fluid from Back Updated: 01/24/23 0929    Fungus culture [[id number]] Collected: 01/24/23 0913    Order Status: Sent Specimen: Body Fluid from Back Updated: 01/24/23 0927              Diet  Diet Adult Regular (IDDSI Level 7)  Diet Adult Regular (IDDSI Level 7)        Assessment/Plan:     Psychiatric  Anxiety and depression  Continue Buproprion for depression  Continue Lorazepam for anxiety  Continue Gabapentin for neuropathy and anxiety    Substance use  disorder  Denies IV drug use (meth) for past 3 years.  Denies alcohol and tobacco abuse.       ID  Thoracic discitis  CT T/L spine thin cut 1/20 with haloing of pelvic and T10 screws, spondylodiscitis T8-9, T9-10  CT C spine with concern for discitis at C6-7  Patient is currently afebrile with chronic pancytopenia  Previously on Amoxicillin up until surgery for chronic suppressive therapy.  ID following  Blood cx (1/29) NGTD  Intra-operative cultures with E.coli ESBL  Continue Vancomycin and meropenem  Trend CBC daily  See above    GI  Chronic hepatitis C with cirrhosis  Hx of Hep C. See Cirrhosis of Liver    Cirrhosis of liver with ascites  Patient has reportedly refused transplant evaluation in the past.   -Daily CMP and trend LFTs  -Continue Lactulose 20g TID   -Will need GI/hepatology follow up outpatient    MELD-Na score: 18 at 1/26/2023 11:42 PM  MELD score: 13 at 1/26/2023 11:42 PM  Calculated from:  Serum Creatinine: 0.5 mg/dL (Using min of 1 mg/dL) at 1/26/2023 11:42 PM  Serum Sodium: 131 mmol/L at 1/26/2023 11:42 PM  Total Bilirubin: 1.9 mg/dL at 1/26/2023 11:42 PM  INR(ratio): 1.4 at 1/25/2023  1:08 AM  Age: 42 years    Orthopedic  * Weakness of both lower extremities  43 yo F w/ history of multiple spinal surgeries presented to OSH with possible infection of patient's shoulder and found to have UTI and E. Coli bacteremia and progressively worse B/L LE weakness. Transferred to [facility] for neurosurgical evaluation. Underwent Laminectomy T8-T10; Posterior spinal fusion T8-T12; hardware removal and washout. Admitted to [facility] for HLOC. Arrived to unit on PCA dilaudid pump. Patient is HDS on 3L NC.   - Admit to NSCCU  - NSGY, ID following  - q2hr neuro checks, vitals, I/Os  - HV drains x 2 in place, management per NSGY  -Transfuse for Hg<7  - Cultures positive for ESBL, blood cultures with NGTD. WCTM  - Antibiotics per ID recs  - CMP, Mag, Phos, CBC daily  - MAP goals > 65, SBP < 180  - PRN labetalol, hydralazine  -  MM pain regimen; PCA Dilaudid pump for pain management  - Patient will need to lie flat with HOB 15-20°   - Aggressive bowel regimen  - PT/OT as appropriate   - VTE prophylaxis: mechanical, hold chemical given pancytopenia  - plan to return to the OR on 2/2          The patient is being Prophylaxed for:  Venous Thromboembolism with: Mechanical  Stress Ulcer with: PPI  Ventilator Pneumonia with: not applicable    Activity Orders          Diet Adult Regular (IDDSI Level 7): Regular starting at 01/30 0852    Turn patient starting at 01/24 1800    Up in chair With meals starting at 01/24 1700    Progressive Mobility Protocol (mobilize patient to their highest level of functioning at least twice daily) starting at 01/24 1105    Elevate HOB 30 starting at 01/24 1105    Ambulate With Assistance, Post Op Day 0 If the patient arrives from PACU by 4PM, walk at least once on day of operation if alert and safe to do so. starting at 01/24 1104        Full Code    Ace Rowley MD  Neurocritical Care  [facility] - Neuro Critical Care    Level 3 of hospital care time was spent personally by me on the following activities: development of treatment plan with patient or surrogate and bedside caregivers, discussions with consultants, evaluation of patient's response to treatment, examination of patient, ordering and performing treatments and interventions, ordering and review of laboratory studies, ordering and review of radiographic studies, pulse oximetry, antibiotic titration if applicable, vasopressor titration if applicable, re-evaluation of patient's condition.

## 2023-01-30 NOTE — NURSING
While turning patient, wound vac tubing disc popped off purple sponge on incision site. RN and charge nurse attempted to recreate seal by taping the disc back to purple sponge and applying a Tegaderm over it. Wound vac has some seal, but still alarms that there is a leak. MD Mandy with [facility] paged and made aware. Per MD, it's OK to leave the dressing how it is, and they will assess site during morning rounds. No new orders given. WCTM.

## 2023-01-30 NOTE — OP NOTE
Stage 1 of 2 planned stages    DATE OF SURGERY: 1/24/23     PREOPERATIVE DIAGNOSIS:  1. T9 and T10 pathologic fracture dislocation secondary to spondylodiscitis with ongoing spinal cord compression and incomplete SCI  2. Hardware failure and osteomyelitis, T10 to pelvis  3. Sepsis  4. Class III obesity, polysubstance abuse and noncompliance with medical management     POSTOPERATIVE DIAGNOSIS:  Same.     PROCEDURE PERFORMED:  1. Removal of posterior spinal hardware T10 to pelvis and reinsertion of hardware at T11 and T12  2. Wound washout and excisional debridement, T10 to pelvis, with placement of epidural antibiotic beads  3. T8, T9 and T10 laminectomy for severe central stenosis with spinal cord compression  4. Posterior segmental spinal fixation, T7-T12 (DePuy Synthes)  5.  Use of intraoperative fluoroscopy  6.  Use of intraoperative neuro-monitoring with MEPs  7. Use of intraoperative CT neuronavigation     SURGEON: Guille Templeton M.D.     CO-SURGEON: Mario Alberto Camilo M.D. -- Dr. Camilo assisted with the placement of all spinal instrumentation because a qualified resident was not available for this portion of the procedure.     ANESTHESIA: GETA     ESTIMATED BLOOD LOSS: 1000mL     COMPLICATIONS: None     DRAINS: Two deep Hemovacs and a Prevena incisional woundvac     SPECIMENS SENT: Epidural cultures and explanted spinal hardware for culture and gross pathology     FINDINGS: None     INDICATIONS:     42F who had  T10 to pelvis fusion in the past for spinal infection returns with sepsis and incomplete SCI after noncompliance with medical management, loss to follow up in clinic and active drug use. Imaging showed spinal cord compression at T9 and T10 secondary to spondylodiscitis and kyphotic deformity. It also showed failure of the hardware from T10-p secondary to presumed infection. I recommended the above procedure as stage 1 of 2 planned stages. R/B/A/I/M were reviewed in detail and the patient wished to  proceed. All questions were answered and no guarantees were made.  .  PROCEDURE:     The patient was brought into the operating room where she was intubated and placed under general anesthesia without difficulty.  All lines were placed. She was repositioned prone onto the operating room table with appropriate padding all pressure points. The region of interest was localized with AP and lateral x-ray.  The skin was marked and the area was prepped and draped in the usual sterile fashion.  A timeout was performed prior to procedure.  20 mL's of lidocaine with epinephrine were injected in the skin.     A midline linear incision was made with a 10 blade from approximately T4 to the pelvis, incorporating her previous midline scar.  Supra and subfascial midline dissection was carried out with Bovie electrocautery.  Subperiosteal dissection was carried out with Bovie electrocautery and Almazan elevators to expose the posterior elements from T4 to pelvis and the previous hardware from T10 to pelvis. The hardware was then removed and found to be grossly loose and covered in malodorous pus. All hte removed hardware was cultured and sent for gross pathology. Next we cultured the epidural space, excisionally debrided subcutaneous tissues, paraspinal muscles and spinal bone with sharp curettes. We then irrigated it with peroxide and Pulsevac lavage.       The CT neuro navigation array was docked onto a spinous process and a CT spin was acquired.  The intraoperative CT confirmed levels..  Using neuro-navigation pedicle screws were placed bilaterally at T7, T8, T11 and T12. At T11 and T12 the prior screw tracts were used with larger diameter screws. Xrays showed good screw position.    Next we performed a T8, T9 and T10 laminectomy in standard fashion with the high speed drill and rongeurs. This was performed for severe cord compression secondary to the kyphosis. During this maneuver we encountered a left sided durotomy which we  repaired in watertight fashion with Gortex suture and Adherus. Adequate decompression of the spinal cord was achieved by this laminectomy as confirmed by Compton palpation.    Bilateral titanium rods were sized, contoured and reduced into the tulip heads. We reduced some of hte kyphosis with gentle manipulation of the rods. Final xrays showed excellent reduction of the kyphosis and excellent position of all hardware.  The wound was copiously irrigated with Pulsevac lavage. Dissolvable antibiotic beads were placed in the subfascial space. Two deep Hemovacs were placed.  A watertight fascial closure was achieved with interrupted 0 Vicryl sutures and a running Stratafix suture.  The soft tissues were closed in layers. Staples and vertical mattress nylon sutures were placed at the skin and a Prevena dressing was applied.     The patient appeared to tolerate the procedure well from a hemodynamic and neuromonitoring standpoint. MEPs were stable in all extremities throughout the case. Dr. Camilo and I were present for all critical portions of the case, and at the end of the case all counts were correct. The patient was repositioned supine onto the hospital bed where she was extubated and allowed to emerge from anesthesia. She was sent to the ICU in stable condition for recovery.    JUSTIFICATION OF CO-SURGEON AND MODIFIER 22: This was a complex multistage revision spinal infection and deformity operation in a Class III obese, medically noncompliant patient with active drug abuse. Inherent risks are significantly elevated over similar procedures for these reasons. Two attending surgeons were indicated to reduce operative times, blood loss, and to improve patient outcomes.

## 2023-01-30 NOTE — ASSESSMENT & PLAN NOTE
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 [place] [facility] [place] was consulted    OR yesterday for temporary T6-12 PSIF. Taz pus noted intraoperatively.   Given intraoperative findings, new operative plan for T4-pelvis revision and extension of fusion with T9-10 corpectomy with plastic surgery assistance planned for 2/2/23    Plan:  Admitted to ICU, Keep in ICU on logroll precautions until stage 2 Thursday.    -- MRI C/T/L spine 1/20 with C7 SP enhancement, focal kyphosis at T8-10 with severe anterior height loss at T9, enhancing C6 inferior endplate Schmorl's node  -- CT T/L spine thin cut 1/20 with haloing of pelvic and T10 screws, spondylodiscitis T8-9, T9-10  -- CT C spine with concern for discitis at C6-7  -- flex ext XR done, limited view of C6-7 in swimmers view 2/2 shoulders  -- 1/20 ESR 70, CRP 9.4. elevated from prior   -- TLSO at bedside  --HV x2 to gravity, W[place] in place, monitor ouput  --ID: Vanc/merropenam   -- BCx 1/20: NGTD   -- OR cultures 1/24: E. coli  --OR 2/2/23 as above   -- will obtain informed consent   -- NPO 2/1/23 midnight  -- multimodal pain control per primary medicine team  NSGY will continue to follow. Please contact team with any further questions.

## 2023-01-30 NOTE — ASSESSMENT & PLAN NOTE
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  ID following  Blood cx (1/29) NGTD  Intra-operative cultures with E.coli ESBL  Continue Vancomycin and meropenem  Trend CBC daily  See above

## 2023-01-30 NOTE — SUBJECTIVE & OBJECTIVE
Interval History:  1/30: MATEO CABRALES    Medications:  Continuous Infusions:   hydromorphone in 0.9 % NaCl 6 mg/30 ml       Scheduled Meds:   acetaminophen  1,000 mg Oral TID    buPROPion  300 mg Oral Daily    gabapentin  600 mg Oral TID    lactulose  20 g Oral TID    meropenem (MERREM) IVPB  2 g Intravenous Q8H    methocarbamoL  1,000 mg Oral QID    pantoprazole  40 mg Oral Daily    polyethylene glycol  17 g Oral BID    senna-docusate 8.6-50 mg  2 tablet Oral BID    sodium chloride 0.9%  10 mL Intravenous Q6H     PRN Meds:dextrose 10%, dextrose 10%, diazePAM, glucagon (human recombinant), glucose, glucose, hydrALAZINE, labetalol, magnesium hydroxide 400 mg/5 ml, magnesium oxide, magnesium oxide, melatonin, naloxone, ondansetron, oxyCODONE, polyethylene glycol, potassium bicarbonate, potassium bicarbonate, potassium bicarbonate, potassium, sodium phosphates, potassium, sodium phosphates, potassium, sodium phosphates, prochlorperazine, sodium chloride 0.9%, Flushing PICC Protocol **AND** sodium chloride 0.9% **AND** sodium chloride 0.9%     Review of Systems  Objective:     Weight: 129.5 kg (285 lb 7.9 oz)  Body mass index is 44.71 kg/m².  Vital Signs (Most Recent):  Temp: 98.6 °F (37 °C) (01/30/23 0701)  Pulse: (!) 119 (01/30/23 0834)  Resp: (!) 24 (01/30/23 0814)  BP: (!) 116/58 (01/30/23 0800)  SpO2: 100 % (01/30/23 0834) Vital Signs (24h Range):  Temp:  [97.9 °F (36.6 °C)-99 °F (37.2 °C)] 98.6 °F (37 °C)  Pulse:  [104-119] 119  Resp:  [10-28] 24  SpO2:  [95 %-100 %] 100 %  BP: ()/(50-83) 116/58     Date 01/30/23 0700 - 01/31/23 0659   Shift 7787-6374 7466-9769 9190-4314 24 Hour Total   INTAKE   I.V.(mL/kg) 30(0.2)   30(0.2)   Shift Total(mL/kg) 30(0.2)   30(0.2)   OUTPUT   Shift Total(mL/kg)       Weight (kg) 129.5 129.5 129.5 129.5                            Neurosurgery Physical Exam  General: well developed, well nourished, no distress.   Head: normocephalic, atraumatic  Neurologic: Alert and oriented.  Thought content appropriate.  GCS: Motor: 6/Verbal: 5/Eyes: 4 GCS Total: 15  Mental Status: Awake, Alert, Oriented x 4  Language: No aphasia  Speech: No dysarthria  Cranial nerves: face symmetric, tongue midline, CN II-XII grossly intact.   Eyes: pupils equal, round, reactive to light with accommodation, EOMI, nystagmus.   Pulmonary: normal respirations, no signs of respiratory distress  Abdomen: soft, non-distended, not tender to palpation  Skin: Skin is warm, dry and intact.  Sensory: intact to light touch throughout     Motor Strength:Moves all extremities spontaneously with good tone.  Full strength upper and extremities. No abnormal movements seen.      Strength   Deltoids Triceps Biceps Wrist Extension Wrist Flexion Hand    Upper: R 5/5 5/5 5/5 5/5 5/5 5/5     L 5/5 5/5 5/5 5/5 5/5 5/5       Iliopsoas Quadriceps Knee  Flexion Tibialis  anterior Gastro- cnemius EHL   Lower: R 3/5 4+/5 4+/5 4/5 4-/5 4/5     L 3/5 4+/5 4+/5 4/5 4-/5 4/5      Reflexes:   DTR: 2+ symmetrically throughout.  Parker's: Negative.  Babinski's: Present bilaterally  Clonus: 2 beats bilateral lower extremity     Incision well healed       Significant Labs:  Recent Labs   Lab 01/29/23  0105 01/30/23  0243   GLU 99 99   * 133*   K 4.6 4.2    102   CO2 25 26   BUN 15 17   CREATININE 0.4* 0.5   CALCIUM 7.6* 7.8*   MG 1.7 1.9       Recent Labs   Lab 01/28/23  2100 01/29/23  0830 01/30/23  0243   WBC 5.03 3.58* 2.90*   HGB 7.4* 7.5* 7.3*   HCT 23.6* 24.3* 24.4*   PLT 99* 91* 80*       No results for input(s): LABPT, INR, APTT in the last 48 hours.    Microbiology Results (last 7 days)       Procedure Component Value Units Date/Time    Culture, Anaerobe [[id number]] Collected: 01/24/23 0943    Order Status: Completed Specimen: Bone from Back Updated: 01/30/23 0943     Anaerobic Culture No anaerobes isolated    Narrative:      4) Perispinal    Culture, Anaerobe [[id number]] Collected: 01/24/23 0943    Order Status: Completed  Specimen: Bone from Back Updated: 01/30/23 0942     Anaerobic Culture No anaerobes isolated    Narrative:      3) Perispinal    Culture, Anaerobe [[id number]] Collected: 01/24/23 0913    Order Status: Completed Specimen: Body Fluid from Back Updated: 01/30/23 0942     Anaerobic Culture No anaerobes isolated    Narrative:      2) Perispinal    Culture, Anaerobe [[id number]] Collected: 01/24/23 0913    Order Status: Completed Specimen: Body Fluid from Back Updated: 01/30/23 0859     Anaerobic Culture No anaerobes isolated    Narrative:      1) Perispinal    Blood culture [[id number]] Collected: 01/27/23 0120    Order Status: Completed Specimen: Blood from Peripheral, Forearm, Right Updated: 01/30/23 0612     Blood Culture, Routine No Growth to date      No Growth to date      No Growth to date      No Growth to date    Blood culture [[id number]] Collected: 01/27/23 0123    Order Status: Completed Specimen: Blood from Peripheral, Antecubital, Left Updated: 01/30/23 0612     Blood Culture, Routine No Growth to date      No Growth to date      No Growth to date      No Growth to date    Aerobic culture [[id number]]  (Abnormal)  (Susceptibility) Collected: 01/24/23 0943    Order Status: Completed Specimen: Bone from Back Updated: 01/28/23 1145     Aerobic Bacterial Culture ESCHERICHIA COLI ESBL  From broth only      Narrative:      4) Perispinal    Aerobic culture [[id number]]  (Abnormal)  (Susceptibility) Collected: 01/24/23 0914    Order Status: Completed Specimen: Wound from Back Updated: 01/26/23 1306     Aerobic Bacterial Culture ESCHERICHIA COLI  Rare      Narrative:      1) Perispinal    Aerobic culture [[id number]]  (Abnormal)  (Susceptibility) Collected: 01/24/23 0914    Order Status: Completed Specimen: Wound from Back Updated: 01/26/23 1306     Aerobic Bacterial Culture ESCHERICHIA COLI ESBL  Few      Narrative:      2) Perispinal    Aerobic culture [[id number]]  (Abnormal)  (Susceptibility) Collected: 01/24/23 0943     Order Status: Completed Specimen: Bone from Back Updated: 01/26/23 1252     Aerobic Bacterial Culture ESCHERICHIA COLI ESBL  Few      Narrative:      3) Perispinal    AFB Culture & Smear [[id number]] Collected: 01/24/23 0943    Order Status: Completed Specimen: Bone from Back Updated: 01/25/23 2127     AFB Culture & Smear Culture in progress     AFB CULTURE STAIN No acid fast bacilli seen.    Narrative:      4) Perispinal    AFB Culture & Smear [[id number]] Collected: 01/24/23 0943    Order Status: Completed Specimen: Bone from Back Updated: 01/25/23 2127     AFB Culture & Smear Culture in progress     AFB CULTURE STAIN No acid fast bacilli seen.    Narrative:      3) Perispinal    AFB Culture & Smear [[id number]] Collected: 01/24/23 0913    Order Status: Completed Specimen: Body Fluid from Back Updated: 01/25/23 2127     AFB Culture & Smear Culture in progress     AFB CULTURE STAIN No acid fast bacilli seen.    Narrative:      1) Perispinal    AFB Culture & Smear [[id number]] Collected: 01/24/23 0913    Order Status: Completed Specimen: Body Fluid from Back Updated: 01/25/23 2127     AFB Culture & Smear Culture in progress     AFB CULTURE STAIN No acid fast bacilli seen.    Narrative:      2) Perispinal    Blood culture [[id number]] Collected: 01/20/23 1131    Order Status: Completed Specimen: Blood Updated: 01/25/23 1412     Blood Culture, Routine No growth after 5 days.    Narrative:      Collection has been rescheduled by [facility] at 01/20/2023 11:03 Reason:   Patient unavailable in room with doctors   Collection has been rescheduled by 2 at 01/20/2023 11:03 Reason:   Patient unavailable in room with doctors     Blood culture [[id number]] Collected: 01/20/23 1131    Order Status: Completed Specimen: Blood Updated: 01/25/23 1412     Blood Culture, Routine No growth after 5 days.    Narrative:      Collection has been rescheduled by [facility] at 01/20/2023 11:03 Reason:   Patient unavailable in room with doctors   Collection has  been rescheduled by [facility] at 01/20/2023 11:03 Reason:   Patient unavailable in room with doctors     Gram stain [[id number]] Collected: 01/24/23 0913    Order Status: Completed Specimen: Body Fluid from Back Updated: 01/24/23 1239     Gram Stain Result No WBC's      No organisms seen    Narrative:      1) Perispinal    Gram stain [[id number]] Collected: 01/24/23 0944    Order Status: Completed Specimen: Bone from Back Updated: 01/24/23 1238     Gram Stain Result Rare WBC's      No organisms seen    Narrative:      3) Perispinal    Gram stain [[id number]] Collected: 01/24/23 0913    Order Status: Completed Specimen: Body Fluid from Back Updated: 01/24/23 1237     Gram Stain Result Rare WBC's      No organisms seen    Narrative:      2) Perispinal    Gram stain [[id number]] Collected: 01/24/23 0943    Order Status: Completed Specimen: Bone from Back Updated: 01/24/23 1236     Gram Stain Result No WBC's      No organisms seen    Narrative:      4) Perispinal    Fungus culture [[id number]] Collected: 01/24/23 0943    Order Status: Sent Specimen: Bone from Back Updated: 01/24/23 1009    Fungus culture [[id number]] Collected: 01/24/23 0943    Order Status: Sent Specimen: Bone from Back Updated: 01/24/23 1007    Fungus culture [[id number]] Collected: 01/24/23 0913    Order Status: Sent Specimen: Body Fluid from Back Updated: 01/24/23 0929    Fungus culture [[id number]] Collected: 01/24/23 0913    Order Status: Sent Specimen: Body Fluid from Back Updated: 01/24/23 0927          All pertinent labs from the last 24 hours have been reviewed.    Significant Diagnostics:  CT C-spine:  Vertebrae: The dens, lateral masses, and occipital condyles are intact.  There is no evidence of fracture or dislocation.     Discs: Multilevel disc height loss and degenerative endplate change.  Prominent C6 inferior endplate Schmorl's node, similar in appearance dating back to MRI from 02/14/2022.     Degenerative changes: Sent spinal canal dimensions at  C3-C4 are 10.5 mm, 8.5 mm at C4-C5, 10.0 mm at C5-C6, and 10.8 mm at C6-C7 .  Multilevel uncovertebral spurring with multilevel neural foraminal narrowing most pronounced at C5-C6 with moderate right neural foraminal narrowing and C6-C7 with moderate left neural foraminal narrowing.    Physical Exam  Neurosurgery Physical Exam

## 2023-01-30 NOTE — PLAN OF CARE
[facility] - Neuro Critical Care  Discharge Reassessment    Primary Care Provider: Dannielle Merino DO    Expected Discharge Date: 2/8/2023    Per Neurosurgery: OR yesterday for temporary T6-12 PSIF. Taz pus noted intraoperatively.   Given intraoperative findings, new operative plan for T4-pelvis revision and extension of fusion with T9-10 corpectomy with plastic surgery assistance planned for 2/2/23       Keep in ICU on logroll precautions until stage 2 Thursday.    Patient not medically ready for discharge.     Reassessment (most recent)       Discharge Reassessment - 01/30/23 1701          Discharge Reassessment    Assessment Type Discharge Planning Reassessment     Did the patient's condition or plan change since previous assessment? No     Communicated SHIRA with patient/caregiver Date not available/Unable to determine     Discharge Plan A Long-term acute care facility (LTAC)     Discharge Plan B Rehab     DME Needed Upon Discharge  other (see comments)   tbd    Discharge Barriers Identified Underinsured     Why the patient remains in the hospital Requires continued medical care                   Ernestina Elizabeth RN, CCRN-K, [facility]  Neuro-Critical Care   [phone]

## 2023-01-30 NOTE — PT/OT/SLP PROGRESS
Physical Therapy      Patient Name:  Rachel Zazueta   MRN:  [id number]    Patient not seen today secondary to pt with active HOB restrictions. Current PT orders discontinued. Will follow-up pending new orders following HOB liberalization.

## 2023-01-30 NOTE — PROGRESS NOTES
[facility]  Neurosurgery  Progress Note    Subjective:     History of Present Illness: Ms. Zazueta is a 42 y.o F w/ hx ofIV drug use (methamphetamine), chronic HCV with cirrhosis, spinal fusion (04/2021), epidural abscess with removal of hardware (02/2022), spinal fusion with hardware (T10 - Pelvis / 03/2022), obesity (BMI 39.3) and neurogenic bladder and recent shoulder surgery who initially presented to [facility] for evaluation of back pain and left leg weakness.  She reports initially noting the left leg weakness when she was about to go to the bathroom and was about to fall but was assisted by her boyfriend.  She was brought to the ED via EMS.  Urinalysis with UTI concerns and blood cultures at OSH grew ESBL E coli, and shoulder effusion concern for infection so she was treated with meropenem.  During hospitalization, she reports the weakness that started out in her left leg initially then spread upwards to her left thigh, left hip, then crossed over to the right hip and spread to her right leg.  Denies recent IV drug use. She reports her last incidence of drug use was more than 3 years ago and also denies tobacco, and alcohol abuse.  Reports cannabis use.     OSH course notable for concerning urinalysis and blood cultures positive for ESBL E coli treated with meropenem.  She was also admit to the ICU where she was treated with Precedex for significant body aches, irritability, and muscle spasms.  Concerns of a murmur on physical exam so she underwent TTE which did not show any vegetations.  Worsening lower extremity weakness workup with MRI head nonspecific, MRI cervical spine with multilevel spondylosis, X-ray spine with multilevel thoracolumbar fusion and diskectomy, focal kyphosis at T9-10 increased compared to prior.  She was started on prophylaxis amoxicillin due to her history of infected hardware.  MRI spine unable to be completed due to patient's body habitus so she was  transferred to [facility] [place] for further imaging and Neurosurgery, Neurology evaluation.      Post-Op Info:  Procedure(s) (LRB):  **AIRO** T8-T12 POSTERIOR FUSION, T8-T10 LAMINECTOMY, HARDWARE REMOVAL AND WASHOUT (N/A)   6 Days Post-Op     Interval History:  1/30: MATEO CABRALES    Medications:  Continuous Infusions:   hydromorphone in 0.9 % NaCl 6 mg/30 ml       Scheduled Meds:   acetaminophen  1,000 mg Oral TID    buPROPion  300 mg Oral Daily    gabapentin  600 mg Oral TID    lactulose  20 g Oral TID    meropenem (MERREM) IVPB  2 g Intravenous Q8H    methocarbamoL  1,000 mg Oral QID    pantoprazole  40 mg Oral Daily    polyethylene glycol  17 g Oral BID    senna-docusate 8.6-50 mg  2 tablet Oral BID    sodium chloride 0.9%  10 mL Intravenous Q6H     PRN Meds:dextrose 10%, dextrose 10%, diazePAM, glucagon (human recombinant), glucose, glucose, hydrALAZINE, labetalol, magnesium hydroxide 400 mg/5 ml, magnesium oxide, magnesium oxide, melatonin, naloxone, ondansetron, oxyCODONE, polyethylene glycol, potassium bicarbonate, potassium bicarbonate, potassium bicarbonate, potassium, sodium phosphates, potassium, sodium phosphates, potassium, sodium phosphates, prochlorperazine, sodium chloride 0.9%, Flushing PICC Protocol **AND** sodium chloride 0.9% **AND** sodium chloride 0.9%     Review of Systems  Objective:     Weight: 129.5 kg (285 lb 7.9 oz)  Body mass index is 44.71 kg/m².  Vital Signs (Most Recent):  Temp: 98.6 °F (37 °C) (01/30/23 0701)  Pulse: (!) 119 (01/30/23 0834)  Resp: (!) 24 (01/30/23 0814)  BP: (!) 116/58 (01/30/23 0800)  SpO2: 100 % (01/30/23 0834) Vital Signs (24h Range):  Temp:  [97.9 °F (36.6 °C)-99 °F (37.2 °C)] 98.6 °F (37 °C)  Pulse:  [104-119] 119  Resp:  [10-28] 24  SpO2:  [95 %-100 %] 100 %  BP: ()/(50-83) 116/58     Date 01/30/23 0700 - 01/31/23 0659   Shift 9634-9501 0204-8283 4566-5819 24 Hour Total   INTAKE   I.V.(mL/kg) 30(0.2)   30(0.2)   Shift Total(mL/kg) 30(0.2)   30(0.2)   OUTPUT    Shift Total(mL/kg)       Weight (kg) 129.5 129.5 129.5 129.5                            Neurosurgery Physical Exam  General: well developed, well nourished, no distress.   Head: normocephalic, atraumatic  Neurologic: Alert and oriented. Thought content appropriate.  GCS: Motor: 6/Verbal: 5/Eyes: 4 GCS Total: 15  Mental Status: Awake, Alert, Oriented x 4  Language: No aphasia  Speech: No dysarthria  Cranial nerves: face symmetric, tongue midline, CN II-XII grossly intact.   Eyes: pupils equal, round, reactive to light with accommodation, EOMI, nystagmus.   Pulmonary: normal respirations, no signs of respiratory distress  Abdomen: soft, non-distended, not tender to palpation  Skin: Skin is warm, dry and intact.  Sensory: intact to light touch throughout     Motor Strength:Moves all extremities spontaneously with good tone.  Full strength upper and extremities. No abnormal movements seen.      Strength   Deltoids Triceps Biceps Wrist Extension Wrist Flexion Hand    Upper: R 5/5 5/5 5/5 5/5 5/5 5/5     L 5/5 5/5 5/5 5/5 5/5 5/5       Iliopsoas Quadriceps Knee  Flexion Tibialis  anterior Gastro- cnemius EHL   Lower: R 3/5 4+/5 4+/5 4/5 4-/5 4/5     L 3/5 4+/5 4+/5 4/5 4-/5 4/5      Reflexes:   DTR: 2+ symmetrically throughout.  Parker's: Negative.  Babinski's: Present bilaterally  Clonus: 2 beats bilateral lower extremity     Incision well healed       Significant Labs:  Recent Labs   Lab 01/29/23  0105 01/30/23  0243   GLU 99 99   * 133*   K 4.6 4.2    102   CO2 25 26   BUN 15 17   CREATININE 0.4* 0.5   CALCIUM 7.6* 7.8*   MG 1.7 1.9       Recent Labs   Lab 01/28/23  2100 01/29/23  0830 01/30/23  0243   WBC 5.03 3.58* 2.90*   HGB 7.4* 7.5* 7.3*   HCT 23.6* 24.3* 24.4*   PLT 99* 91* 80*       No results for input(s): LABPT, INR, APTT in the last 48 hours.    Microbiology Results (last 7 days)       Procedure Component Value Units Date/Time    Culture, Anaerobe [[id number]] Collected: 01/24/23 0943     Order Status: Completed Specimen: Bone from Back Updated: 01/30/23 0943     Anaerobic Culture No anaerobes isolated    Narrative:      4) Perispinal    Culture, Anaerobe [[id number]] Collected: 01/24/23 0943    Order Status: Completed Specimen: Bone from Back Updated: 01/30/23 0942     Anaerobic Culture No anaerobes isolated    Narrative:      3) Perispinal    Culture, Anaerobe [[id number]] Collected: 01/24/23 0913    Order Status: Completed Specimen: Body Fluid from Back Updated: 01/30/23 0942     Anaerobic Culture No anaerobes isolated    Narrative:      2) Perispinal    Culture, Anaerobe [[id number]] Collected: 01/24/23 0913    Order Status: Completed Specimen: Body Fluid from Back Updated: 01/30/23 0859     Anaerobic Culture No anaerobes isolated    Narrative:      1) Perispinal    Blood culture [[id number]] Collected: 01/27/23 0120    Order Status: Completed Specimen: Blood from Peripheral, Forearm, Right Updated: 01/30/23 0612     Blood Culture, Routine No Growth to date      No Growth to date      No Growth to date      No Growth to date    Blood culture [[id number]] Collected: 01/27/23 0123    Order Status: Completed Specimen: Blood from Peripheral, Antecubital, Left Updated: 01/30/23 0612     Blood Culture, Routine No Growth to date      No Growth to date      No Growth to date      No Growth to date    Aerobic culture [[id number]]  (Abnormal)  (Susceptibility) Collected: 01/24/23 0943    Order Status: Completed Specimen: Bone from Back Updated: 01/28/23 1145     Aerobic Bacterial Culture ESCHERICHIA COLI ESBL  From broth only      Narrative:      4) Perispinal    Aerobic culture [[id number]]  (Abnormal)  (Susceptibility) Collected: 01/24/23 0914    Order Status: Completed Specimen: Wound from Back Updated: 01/26/23 1306     Aerobic Bacterial Culture ESCHERICHIA COLI  Rare      Narrative:      1) Perispinal    Aerobic culture [[id number]]  (Abnormal)  (Susceptibility) Collected: 01/24/23 0914    Order Status:  Completed Specimen: Wound from Back Updated: 01/26/23 1306     Aerobic Bacterial Culture ESCHERICHIA COLI ESBL  Few      Narrative:      2) Perispinal    Aerobic culture [[id number]]  (Abnormal)  (Susceptibility) Collected: 01/24/23 0943    Order Status: Completed Specimen: Bone from Back Updated: 01/26/23 1252     Aerobic Bacterial Culture ESCHERICHIA COLI ESBL  Few      Narrative:      3) Perispinal    AFB Culture & Smear [[id number]] Collected: 01/24/23 0943    Order Status: Completed Specimen: Bone from Back Updated: 01/25/23 2127     AFB Culture & Smear Culture in progress     AFB CULTURE STAIN No acid fast bacilli seen.    Narrative:      4) Perispinal    AFB Culture & Smear [[id number]] Collected: 01/24/23 0943    Order Status: Completed Specimen: Bone from Back Updated: 01/25/23 2127     AFB Culture & Smear Culture in progress     AFB CULTURE STAIN No acid fast bacilli seen.    Narrative:      3) Perispinal    AFB Culture & Smear [[id number]] Collected: 01/24/23 0913    Order Status: Completed Specimen: Body Fluid from Back Updated: 01/25/23 2127     AFB Culture & Smear Culture in progress     AFB CULTURE STAIN No acid fast bacilli seen.    Narrative:      1) Perispinal    AFB Culture & Smear [[id number]] Collected: 01/24/23 0913    Order Status: Completed Specimen: Body Fluid from Back Updated: 01/25/23 2127     AFB Culture & Smear Culture in progress     AFB CULTURE STAIN No acid fast bacilli seen.    Narrative:      2) Perispinal    Blood culture [[id number]] Collected: 01/20/23 1131    Order Status: Completed Specimen: Blood Updated: 01/25/23 1412     Blood Culture, Routine No growth after 5 days.    Narrative:      Collection has been rescheduled by [facility] at 01/20/2023 11:03 Reason:   Patient unavailable in room with doctors   Collection has been rescheduled by 2 at 01/20/2023 11:03 Reason:   Patient unavailable in room with doctors     Blood culture [[id number]] Collected: 01/20/23 1131    Order Status:  Completed Specimen: Blood Updated: 01/25/23 1412     Blood Culture, Routine No growth after 5 days.    Narrative:      Collection has been rescheduled by [facility] at 01/20/2023 11:03 Reason:   Patient unavailable in room with doctors   Collection has been rescheduled by [facility] at 01/20/2023 11:03 Reason:   Patient unavailable in room with doctors     Gram stain [[id number]] Collected: 01/24/23 0913    Order Status: Completed Specimen: Body Fluid from Back Updated: 01/24/23 1239     Gram Stain Result No WBC's      No organisms seen    Narrative:      1) Perispinal    Gram stain [[id number]] Collected: 01/24/23 0944    Order Status: Completed Specimen: Bone from Back Updated: 01/24/23 1238     Gram Stain Result Rare WBC's      No organisms seen    Narrative:      3) Perispinal    Gram stain [[id number]] Collected: 01/24/23 0913    Order Status: Completed Specimen: Body Fluid from Back Updated: 01/24/23 1237     Gram Stain Result Rare WBC's      No organisms seen    Narrative:      2) Perispinal    Gram stain [[id number]] Collected: 01/24/23 0943    Order Status: Completed Specimen: Bone from Back Updated: 01/24/23 1236     Gram Stain Result No WBC's      No organisms seen    Narrative:      4) Perispinal    Fungus culture [[id number]] Collected: 01/24/23 0943    Order Status: Sent Specimen: Bone from Back Updated: 01/24/23 1009    Fungus culture [[id number]] Collected: 01/24/23 0943    Order Status: Sent Specimen: Bone from Back Updated: 01/24/23 1007    Fungus culture [[id number]] Collected: 01/24/23 0913    Order Status: Sent Specimen: Body Fluid from Back Updated: 01/24/23 0929    Fungus culture [[id number]] Collected: 01/24/23 0913    Order Status: Sent Specimen: Body Fluid from Back Updated: 01/24/23 0927          All pertinent labs from the last 24 hours have been reviewed.    Significant Diagnostics:  CT C-spine:  Vertebrae: The dens, lateral masses, and occipital condyles are intact.  There is no evidence of fracture or  dislocation.     Discs: Multilevel disc height loss and degenerative endplate change.  Prominent C6 inferior endplate Schmorl's node, similar in appearance dating back to MRI from 02/14/2022.     Degenerative changes: Sent spinal canal dimensions at C3-C4 are 10.5 mm, 8.5 mm at C4-C5, 10.0 mm at C5-C6, and 10.8 mm at C6-C7 .  Multilevel uncovertebral spurring with multilevel neural foraminal narrowing most pronounced at C5-C6 with moderate right neural foraminal narrowing and C6-C7 with moderate left neural foraminal narrowing.    Physical Exam  Neurosurgery Physical Exam    Assessment/Plan:     * Weakness of both lower extremities  Pt is 42F multiple spinal surgeries and revisions last T10- Pelvis in March 2022 who presented to OSH with concern for shoulder infection and UTI found to have E coli sepsis who has had progressive worsening BLE weakness, XR showed possible worsening proximal junctional kyphotic deformity. Transferred to [facility] to  and neurosurgery was consulted    OR yesterday for temporary T6-12 PSIF. Taz pus noted intraoperatively.   Given intraoperative findings, new operative plan for T4-pelvis revision and extension of fusion with T9-10 corpectomy with plastic surgery assistance planned for 2/2/23    Plan:  Admitted to ICU, Keep in ICU on logroll precautions until stage 2 Thursday.    -- MRI C/T/L spine 1/20 with C7 SP enhancement, focal kyphosis at T8-10 with severe anterior height loss at T9, enhancing C6 inferior endplate Schmorl's node  -- CT T/L spine thin cut 1/20 with haloing of pelvic and T10 screws, spondylodiscitis T8-9, T9-10  -- CT C spine with concern for discitis at C6-7  -- flex ext XR done, limited view of C6-7 in swimmers view 2/2 shoulders  -- 1/20 ESR 70, CRP 9.4. elevated from prior   -- TLSO at bedside  --HV x2 to gravity, WV in place, monitor ouput  --ID: Vanc/merropenam   -- BCx 1/20: NGTD   -- OR cultures 1/24: E. coli  --OR 2/2/23 as above   -- will obtain informed  consent   -- NPO 2/1/23 midnight  -- multimodal pain control per primary medicine team  NSGY will continue to follow. Please contact team with any further questions.        Mabel Chang MD  Neurosurgery  [facility]

## 2023-01-30 NOTE — SUBJECTIVE & OBJECTIVE
Interval History:     No adverse events    Review of Systems   Constitutional:  Negative for chills, fatigue, fever and unexpected weight change.   HENT:  Negative for ear pain, facial swelling, hearing loss, mouth sores, nosebleeds, rhinorrhea, sinus pressure, sore throat, tinnitus, trouble swallowing and voice change.    Eyes:  Negative for photophobia, pain, redness and visual disturbance.   Respiratory:  Negative for cough, chest tightness, shortness of breath and wheezing.    Cardiovascular:  Negative for chest pain, palpitations and leg swelling.   Gastrointestinal:  Negative for abdominal pain, blood in stool, constipation, diarrhea, nausea and vomiting.   Endocrine: Negative for cold intolerance, heat intolerance, polydipsia, polyphagia and polyuria.   Genitourinary:  Negative for decreased urine volume, dysuria, flank pain, frequency, hematuria, menstrual problem, urgency, vaginal bleeding, vaginal discharge and vaginal pain.   Musculoskeletal:  Positive for back pain and myalgias. Negative for arthralgias, joint swelling and neck pain.   Skin:  Negative for rash.   Allergic/Immunologic: Negative for environmental allergies, food allergies and immunocompromised state.   Neurological:  Negative for dizziness, seizures, syncope, weakness, light-headedness, numbness and headaches.   Hematological:  Negative for adenopathy. Does not bruise/bleed easily.   Psychiatric/Behavioral:  Negative for confusion, hallucinations, self-injury, sleep disturbance and suicidal ideas. The patient is not nervous/anxious.    Objective:     Vital Signs (Most Recent):  Temp: 97.9 °F (36.6 °C) (01/29/23 1501)  Pulse: 107 (01/29/23 1701)  Resp: 16 (01/29/23 1701)  BP: (!) 104/51 (01/29/23 1701)  SpO2: 97 % (01/29/23 1701)   Vital Signs (24h Range):  Temp:  [97.9 °F (36.6 °C)-98.7 °F (37.1 °C)] 97.9 °F (36.6 °C)  Pulse:  [102-118] 107  Resp:  [10-30] 16  SpO2:  [94 %-100 %] 97 %  BP: ()/(49-83) 104/51     Weight: 104 kg (229 lb  4.5 oz)  Body mass index is 35.91 kg/m².    Estimated Creatinine Clearance: 227.3 mL/min (A) (based on SCr of 0.4 mg/dL (L)).    Physical Exam  Vitals and nursing note reviewed.   Constitutional:       Appearance: She is well-developed.   HENT:      Head: Normocephalic and atraumatic.      Right Ear: External ear normal.      Left Ear: External ear normal.   Eyes:      General: No scleral icterus.        Right eye: No discharge.         Left eye: No discharge.      Conjunctiva/sclera: Conjunctivae normal.   Cardiovascular:      Rate and Rhythm: Normal rate and regular rhythm.      Heart sounds: Normal heart sounds. No murmur heard.    No friction rub. No gallop.   Pulmonary:      Effort: Pulmonary effort is normal. No respiratory distress.      Breath sounds: Normal breath sounds. No stridor. No wheezing or rales.   Abdominal:      General: Bowel sounds are normal.   Musculoskeletal:         General: No tenderness. Normal range of motion.   Skin:     General: Skin is warm and dry.   Neurological:      Mental Status: She is alert and oriented to person, place, and time.       Significant Labs:   Microbiology Results (last 7 days)       Procedure Component Value Units Date/Time    Blood culture [[id number]] Collected: 01/27/23 0120    Order Status: Completed Specimen: Blood from Peripheral, Forearm, Right Updated: 01/29/23 0613     Blood Culture, Routine No Growth to date      No Growth to date      No Growth to date    Blood culture [[id number]] Collected: 01/27/23 0123    Order Status: Completed Specimen: Blood from Peripheral, Antecubital, Left Updated: 01/29/23 0613     Blood Culture, Routine No Growth to date      No Growth to date      No Growth to date    Aerobic culture [[id number]]  (Abnormal)  (Susceptibility) Collected: 01/24/23 0943    Order Status: Completed Specimen: Bone from Back Updated: 01/28/23 1145     Aerobic Bacterial Culture ESCHERICHIA COLI ESBL  From broth only      Narrative:      4) Perispinal     Aerobic culture [[id number]]  (Abnormal)  (Susceptibility) Collected: 01/24/23 0914    Order Status: Completed Specimen: Wound from Back Updated: 01/26/23 1306     Aerobic Bacterial Culture ESCHERICHIA COLI  Rare      Narrative:      1) Perispinal    Aerobic culture [[id number]]  (Abnormal)  (Susceptibility) Collected: 01/24/23 0914    Order Status: Completed Specimen: Wound from Back Updated: 01/26/23 1306     Aerobic Bacterial Culture ESCHERICHIA COLI ESBL  Few      Narrative:      2) Perispinal    Aerobic culture [[id number]]  (Abnormal)  (Susceptibility) Collected: 01/24/23 0943    Order Status: Completed Specimen: Bone from Back Updated: 01/26/23 1252     Aerobic Bacterial Culture ESCHERICHIA COLI ESBL  Few      Narrative:      3) Perispinal    Culture, Anaerobe [[id number]] Collected: 01/24/23 0943    Order Status: Completed Specimen: Bone from Back Updated: 01/26/23 1238     Anaerobic Culture Culture in progress    Narrative:      4) Perispinal    Culture, Anaerobe [[id number]] Collected: 01/24/23 0943    Order Status: Completed Specimen: Bone from Back Updated: 01/26/23 1237     Anaerobic Culture Culture in progress    Narrative:      3) Perispinal    Culture, Anaerobe [[id number]] Collected: 01/24/23 0913    Order Status: Completed Specimen: Body Fluid from Back Updated: 01/26/23 1237     Anaerobic Culture Culture in progress    Narrative:      1) Perispinal    AFB Culture & Smear [[id number]] Collected: 01/24/23 0943    Order Status: Completed Specimen: Bone from Back Updated: 01/25/23 2127     AFB Culture & Smear Culture in progress     AFB CULTURE STAIN No acid fast bacilli seen.    Narrative:      4) Perispinal    AFB Culture & Smear [[id number]] Collected: 01/24/23 0943    Order Status: Completed Specimen: Bone from Back Updated: 01/25/23 2127     AFB Culture & Smear Culture in progress     AFB CULTURE STAIN No acid fast bacilli seen.    Narrative:      3) Perispinal    AFB Culture & Smear [[id number]]  Collected: 01/24/23 0913    Order Status: Completed Specimen: Body Fluid from Back Updated: 01/25/23 2127     AFB Culture & Smear Culture in progress     AFB CULTURE STAIN No acid fast bacilli seen.    Narrative:      1) Perispinal    AFB Culture & Smear [[id number]] Collected: 01/24/23 0913    Order Status: Completed Specimen: Body Fluid from Back Updated: 01/25/23 2127     AFB Culture & Smear Culture in progress     AFB CULTURE STAIN No acid fast bacilli seen.    Narrative:      2) Perispinal    Blood culture [[id number]] Collected: 01/20/23 1131    Order Status: Completed Specimen: Blood Updated: 01/25/23 1412     Blood Culture, Routine No growth after 5 days.    Narrative:      Collection has been rescheduled by [facility] at 01/20/2023 11:03 Reason:   Patient unavailable in room with doctors   Collection has been rescheduled by [facility] at 01/20/2023 11:03 Reason:   Patient unavailable in room with doctors     Blood culture [[id number]] Collected: 01/20/23 1131    Order Status: Completed Specimen: Blood Updated: 01/25/23 1412     Blood Culture, Routine No growth after 5 days.    Narrative:      Collection has been rescheduled by [facility] at 01/20/2023 11:03 Reason:   Patient unavailable in room with doctors   Collection has been rescheduled by [facility] at 01/20/2023 11:03 Reason:   Patient unavailable in room with doctors     Culture, Anaerobe [[id number]] Collected: 01/24/23 0913    Order Status: Completed Specimen: Body Fluid from Back Updated: 01/25/23 0658     Anaerobic Culture Culture in progress    Narrative:      2) Perispinal    Gram stain [[id number]] Collected: 01/24/23 0913    Order Status: Completed Specimen: Body Fluid from Back Updated: 01/24/23 1239     Gram Stain Result No WBC's      No organisms seen    Narrative:      1) Perispinal    Gram stain [[id number]] Collected: 01/24/23 0944    Order Status: Completed Specimen: Bone from Back Updated: 01/24/23 1238     Gram Stain Result Rare WBC's      No organisms seen     Narrative:      3) Perispinal    Gram stain [[id number]] Collected: 01/24/23 0913    Order Status: Completed Specimen: Body Fluid from Back Updated: 01/24/23 1237     Gram Stain Result Rare WBC's      No organisms seen    Narrative:      2) Perispinal    Gram stain [[id number]] Collected: 01/24/23 0943    Order Status: Completed Specimen: Bone from Back Updated: 01/24/23 1236     Gram Stain Result No WBC's      No organisms seen    Narrative:      4) Perispinal    Fungus culture [[id number]] Collected: 01/24/23 0943    Order Status: Sent Specimen: Bone from Back Updated: 01/24/23 1009    Fungus culture [[id number]] Collected: 01/24/23 0943    Order Status: Sent Specimen: Bone from Back Updated: 01/24/23 1007    Fungus culture [[id number]] Collected: 01/24/23 0913    Order Status: Sent Specimen: Body Fluid from Back Updated: 01/24/23 0929    Fungus culture [[id number]] Collected: 01/24/23 0913    Order Status: Sent Specimen: Body Fluid from Back Updated: 01/24/23 0927            Significant Imaging: I have reviewed all pertinent imaging results/findings within the past 24 hours.

## 2023-01-30 NOTE — ASSESSMENT & PLAN NOTE
41 yo F w/ history of multiple spinal surgeries presented to OSH with possible infection of patient's shoulder and found to have UTI and E. Coli bacteremia and progressively worse B/L LE weakness. Transferred to [facility] for neurosurgical evaluation. Underwent Laminectomy T8-T10; Posterior spinal fusion T8-T12; hardware removal and washout. Admitted to [facility] for HLOC. Arrived to unit on PCA dilaudid pump. Patient is HDS on 3L NC.   - Admit to [facility]U  - NSGY, ID following  - q2hr neuro checks, vitals, I/Os  - HV drains x 2 in place, management per NSGY  -Transfuse for Hg<7  - Cultures positive for ESBL, blood cultures with NGTD. WCTM  - Antibiotics per ID recs  - CMP, Mag, Phos, CBC daily  - MAP goals > 65, SBP < 180  - PRN labetalol, hydralazine  - MM pain regimen; PCA Dilaudid pump for pain management  - Patient will need to lie flat with HOB 15-20°   - Aggressive bowel regimen  - PT/OT as appropriate   - VTE prophylaxis: mechanical, hold chemical given pancytopenia  - plan to return to the OR on 2/2

## 2023-01-30 NOTE — SUBJECTIVE & OBJECTIVE
Interval History:  See hospital course above.     Review of Systems   Constitutional:  Negative for fever.   HENT:  Negative for congestion and rhinorrhea.    Eyes:  Negative for visual disturbance.   Respiratory:  Negative for shortness of breath.    Cardiovascular:  Negative for chest pain.   Gastrointestinal:  Negative for abdominal distention, abdominal pain, nausea and vomiting.   Genitourinary:  Negative for dysuria and hematuria.   Musculoskeletal:  Positive for back pain. Negative for neck pain.   Neurological:  Positive for weakness. Negative for dizziness, seizures, light-headedness, numbness and headaches.     Objective:     Vitals:  Temp: 98.4 °F (36.9 °C)  Pulse: (!) 114  Rhythm: sinus tachycardia  BP: (!) 114/56  MAP (mmHg): 81  Resp: (!) 24  ETCO2 (mmHg): 43 mmHg  SpO2: 97 %    Temp  Min: 97.9 °F (36.6 °C)  Max: 99 °F (37.2 °C)  Pulse  Min: 104  Max: 125  BP  Min: 95/63  Max: 133/60  MAP (mmHg)  Min: 69  Max: 89  Resp  Min: 10  Max: 28  ETCO2 (mmHg)  Min: 30 mmHg  Max: 45 mmHg  SpO2  Min: 95 %  Max: 100 %    01/29 0701 - 01/30 0700  In: 651.2 [P.O.:250; I.V.:105]  Out: 1105 [Urine:870; Drains:235]   Unmeasured Output  Urine Occurrence: 1  Stool Occurrence: 1  Pad Count: 3       Physical Exam  Vitals and nursing note reviewed.   Constitutional:       General: She is not in acute distress.     Appearance: She is obese. She is not ill-appearing, toxic-appearing or diaphoretic.   HENT:      Head: Normocephalic.      Mouth/Throat:      Mouth: Mucous membranes are moist.   Eyes:      General: No scleral icterus.        Right eye: No discharge.         Left eye: No discharge.   Cardiovascular:      Rate and Rhythm: Normal rate and regular rhythm.   Pulmonary:      Effort: Pulmonary effort is normal. No respiratory distress.      Comments: NC in place  Abdominal:      General: Abdomen is flat.      Palpations: Abdomen is soft.      Tenderness: There is no abdominal tenderness.   Musculoskeletal:          General: No deformity.      Cervical back: No rigidity.   Skin:     General: Skin is warm and dry.      Coloration: Skin is not jaundiced.   Neurological:      Mental Status: She is alert and oriented to person, place, and time. Mental status is at baseline.      Motor: Weakness (Bilateral LE) and tremor (Bilateral UE) present.      Comments:   E4 V5 M6  Awake, alert, and oriented to self, time, place, and situation. Speech fluent. Answering all questions appropriately and following all commands briskly.   PERRL. Extraocular movements grossly intact. No facial asymmetry. Conversational hearing intact.   FOWLER spontaneously and follows commands. Strength 4/5 in BUE & 3/5 in BLE. Sensation intact to light touch in all 4 extremities.    Psychiatric:         Mood and Affect: Mood normal.         Behavior: Behavior normal.     Gait & coordination exams deferred.      Medications:  Continuoushydromorphone in 0.9 % NaCl 6 mg/30 ml  Scheduledacetaminophen, 1,000 mg, TID  buPROPion, 300 mg, Daily  gabapentin, 600 mg, TID  lactulose, 20 g, TID  meropenem (MERREM) IVPB, 2 g, Q8H  methocarbamoL, 1,000 mg, QID  pantoprazole, 40 mg, Daily  polyethylene glycol, 17 g, BID  senna-docusate 8.6-50 mg, 2 tablet, BID  sodium chloride 0.9%, 10 mL, Q6H  PRNdextrose 10%, 12.5 g, PRN  dextrose 10%, 25 g, PRN  diazePAM, 5 mg, Q6H PRN  glucagon (human recombinant), 1 mg, PRN  glucose, 16 g, PRN  glucose, 24 g, PRN  hydrALAZINE, 10 mg, Q6H PRN  labetalol, 10 mg, Q4H PRN  magnesium hydroxide 400 mg/5 ml, 30 mL, Daily PRN  magnesium oxide, 800 mg, PRN  magnesium oxide, 800 mg, PRN  melatonin, 6 mg, Nightly PRN  naloxone, 0.02 mg, PRN  ondansetron, 4 mg, Q6H PRN  oxyCODONE, 5 mg, Q3H PRN  polyethylene glycol, 17 g, Daily PRN  potassium bicarbonate, 35 mEq, PRN  potassium bicarbonate, 50 mEq, PRN  potassium bicarbonate, 60 mEq, PRN  potassium, sodium phosphates, 2 packet, PRN  potassium, sodium phosphates, 2 packet, PRN  potassium, sodium  phosphates, 2 packet, PRN  prochlorperazine, 2.5 mg, Q6H PRN  sodium chloride 0.9%, 10 mL, Q12H PRN  sodium chloride 0.9%, 10 mL, PRN    Today I personally reviewed pertinent medications, lines/drains/airways, laboratory results, microbiology results, notably:      Laboratory:  CBC:  Recent Labs   Lab 01/30/23 0243   WBC 2.90*   RBC 2.60*   HGB 7.3*   HCT 24.4*   PLT 80*   MCV 94   MCH 28.1   MCHC 29.9*         CMP:  Recent Labs   Lab 01/30/23 0243   CALCIUM 7.8*   PROT 4.9*   *   K 4.2   CO2 26      BUN 17   CREATININE 0.5   ALKPHOS 113   ALT 31   AST 57*   BILITOT 1.4*       Microbiology:  Microbiology Results (last 7 days)       Procedure Component Value Units Date/Time    Culture, Anaerobe [[id number]] Collected: 01/24/23 0943    Order Status: Completed Specimen: Bone from Back Updated: 01/30/23 0943     Anaerobic Culture No anaerobes isolated    Narrative:      4) Perispinal    Culture, Anaerobe [[id number]] Collected: 01/24/23 0943    Order Status: Completed Specimen: Bone from Back Updated: 01/30/23 0942     Anaerobic Culture No anaerobes isolated    Narrative:      3) Perispinal    Culture, Anaerobe [[id number]] Collected: 01/24/23 0913    Order Status: Completed Specimen: Body Fluid from Back Updated: 01/30/23 0942     Anaerobic Culture No anaerobes isolated    Narrative:      2) Perispinal    Culture, Anaerobe [[id number]] Collected: 01/24/23 0913    Order Status: Completed Specimen: Body Fluid from Back Updated: 01/30/23 0859     Anaerobic Culture No anaerobes isolated    Narrative:      1) Perispinal    Blood culture [[id number]] Collected: 01/27/23 0120    Order Status: Completed Specimen: Blood from Peripheral, Forearm, Right Updated: 01/30/23 0612     Blood Culture, Routine No Growth to date      No Growth to date      No Growth to date      No Growth to date    Blood culture [[id number]] Collected: 01/27/23 0123    Order Status: Completed Specimen: Blood from Peripheral, Antecubital, Left  Updated: 01/30/23 0612     Blood Culture, Routine No Growth to date      No Growth to date      No Growth to date      No Growth to date    Aerobic culture [[id number]]  (Abnormal)  (Susceptibility) Collected: 01/24/23 0943    Order Status: Completed Specimen: Bone from Back Updated: 01/28/23 1145     Aerobic Bacterial Culture ESCHERICHIA COLI ESBL  From broth only      Narrative:      4) Perispinal    Aerobic culture [[id number]]  (Abnormal)  (Susceptibility) Collected: 01/24/23 0914    Order Status: Completed Specimen: Wound from Back Updated: 01/26/23 1306     Aerobic Bacterial Culture ESCHERICHIA COLI  Rare      Narrative:      1) Perispinal    Aerobic culture [[id number]]  (Abnormal)  (Susceptibility) Collected: 01/24/23 0914    Order Status: Completed Specimen: Wound from Back Updated: 01/26/23 1306     Aerobic Bacterial Culture ESCHERICHIA COLI ESBL  Few      Narrative:      2) Perispinal    Aerobic culture [[id number]]  (Abnormal)  (Susceptibility) Collected: 01/24/23 0943    Order Status: Completed Specimen: Bone from Back Updated: 01/26/23 1252     Aerobic Bacterial Culture ESCHERICHIA COLI ESBL  Few      Narrative:      3) Perispinal    AFB Culture & Smear [[id number]] Collected: 01/24/23 0943    Order Status: Completed Specimen: Bone from Back Updated: 01/25/23 2127     AFB Culture & Smear Culture in progress     AFB CULTURE STAIN No acid fast bacilli seen.    Narrative:      4) Perispinal    AFB Culture & Smear [[id number]] Collected: 01/24/23 0943    Order Status: Completed Specimen: Bone from Back Updated: 01/25/23 2127     AFB Culture & Smear Culture in progress     AFB CULTURE STAIN No acid fast bacilli seen.    Narrative:      3) Perispinal    AFB Culture & Smear [[id number]] Collected: 01/24/23 0913    Order Status: Completed Specimen: Body Fluid from Back Updated: 01/25/23 2127     AFB Culture & Smear Culture in progress     AFB CULTURE STAIN No acid fast bacilli seen.    Narrative:      1) Perispinal     AFB Culture & Smear [[id number]] Collected: 01/24/23 0913    Order Status: Completed Specimen: Body Fluid from Back Updated: 01/25/23 2127     AFB Culture & Smear Culture in progress     AFB CULTURE STAIN No acid fast bacilli seen.    Narrative:      2) Perispinal    Blood culture [[id number]] Collected: 01/20/23 1131    Order Status: Completed Specimen: Blood Updated: 01/25/23 1412     Blood Culture, Routine No growth after 5 days.    Narrative:      Collection has been rescheduled by [facility] at 01/20/2023 11:03 Reason:   Patient unavailable in room with doctors   Collection has been rescheduled by [facility] at 01/20/2023 11:03 Reason:   Patient unavailable in room with doctors     Blood culture [[id number]] Collected: 01/20/23 1131    Order Status: Completed Specimen: Blood Updated: 01/25/23 1412     Blood Culture, Routine No growth after 5 days.    Narrative:      Collection has been rescheduled by [facility] at 01/20/2023 11:03 Reason:   Patient unavailable in room with doctors   Collection has been rescheduled by [facility] at 01/20/2023 11:03 Reason:   Patient unavailable in room with doctors     Gram stain [[id number]] Collected: 01/24/23 0913    Order Status: Completed Specimen: Body Fluid from Back Updated: 01/24/23 1239     Gram Stain Result No WBC's      No organisms seen    Narrative:      1) Perispinal    Gram stain [[id number]] Collected: 01/24/23 0944    Order Status: Completed Specimen: Bone from Back Updated: 01/24/23 1238     Gram Stain Result Rare WBC's      No organisms seen    Narrative:      3) Perispinal    Gram stain [[id number]] Collected: 01/24/23 0913    Order Status: Completed Specimen: Body Fluid from Back Updated: 01/24/23 1237     Gram Stain Result Rare WBC's      No organisms seen    Narrative:      2) Perispinal    Gram stain [[id number]] Collected: 01/24/23 0943    Order Status: Completed Specimen: Bone from Back Updated: 01/24/23 1236     Gram Stain Result No WBC's      No organisms seen    Narrative:      4)  Perispinal    Fungus culture [[id number]] Collected: 01/24/23 0943    Order Status: Sent Specimen: Bone from Back Updated: 01/24/23 1009    Fungus culture [[id number]] Collected: 01/24/23 0943    Order Status: Sent Specimen: Bone from Back Updated: 01/24/23 1007    Fungus culture [[id number]] Collected: 01/24/23 0913    Order Status: Sent Specimen: Body Fluid from Back Updated: 01/24/23 0929    Fungus culture [[id number]] Collected: 01/24/23 0913    Order Status: Sent Specimen: Body Fluid from Back Updated: 01/24/23 0927              Diet  Diet Adult Regular (IDDSI Level 7)  Diet Adult Regular (IDDSI Level 7)       normal

## 2023-01-30 NOTE — PLAN OF CARE
[facility] Care Plan    POC reviewed with Rachel Zazueta at 0400. Pt verbalized understanding. Questions and concerns addressed. No acute events overnight. Pt progressing toward goals. Will continue to monitor. See below and flowsheets for full assessment and VS info.   - PCA pump continued  - PRN oxy given x2, valium x1, zofran x1, & melatonin x1  - R HV drain to gravity, output: 65cc  - L HV drain to full suction, output: 35cc  - Wound vac has a seal leak; NSGY and NCC made aware. Per NSGY, will assess site during rounds   - BM x4 overnight   - Bath given, linens changed           Is this a stroke patient? no    Neuro:  Jane Coma Scale  Best Eye Response: 4-->(E4) spontaneous  Best Motor Response: 6-->(M6) obeys commands  Best Verbal Response: 5-->(V5) oriented  Jane Coma Scale Score: 15  Assessment Qualifiers: patient not sedated/intubated  Pupil PERRLA: yes     24hr Temp:  [97.9 °F (36.6 °C)-99 °F (37.2 °C)]     CV:   Rhythm: sinus tachycardia  BP goals:   SBP < 140  MAP > 65    Resp:      Oxygen Concentration (%): 24    Plan: N/A    GI/:     Diet/Nutrition Received: regular  Last Bowel Movement: 01/30/23  Voiding Characteristics: external catheter    Intake/Output Summary (Last 24 hours) at 1/30/2023 0440  Last data filed at 1/30/2023 0405  Gross per 24 hour   Intake 636.19 ml   Output 1210 ml   Net -573.81 ml     Unmeasured Output  Urine Occurrence: 1  Stool Occurrence: 2  Pad Count: 5    Labs/Accuchecks:  Recent Labs   Lab 01/30/23  0243   WBC 2.90*   RBC 2.60*   HGB 7.3*   HCT 24.4*   PLT 80*      Recent Labs   Lab 01/30/23  0243   *   K 4.2   CO2 26      BUN 17   CREATININE 0.5   ALKPHOS 113   ALT 31   AST 57*   BILITOT 1.4*      Recent Labs   Lab 01/25/23  0108   INR 1.4*   APTT 25.0    No results for input(s): CPK, CPKMB, TROPONINI, MB in the last 168 hours.    Electrolytes: N/A - electrolytes WDL  Accuchecks: none    Gtts:   hydromorphone in 0.9 % NaCl 6 mg/30 ml          LDA/Wounds:  Lines/Drains/Airways       Peripherally Inserted Central Catheter Line  Duration             PICC Double Lumen 01/26/23 1209 left basilic 3 days              Drain  Duration                  Closed/Suction Drain 01/24/23 Posterior Back Accordion 10 Fr. 6 days         Closed/Suction Drain 01/24/23 Posterior;Right Back Accordion 10 Fr. 6 days    Female External Urinary Catheter 01/27/23 1115 2 days              Peripheral Intravenous Line  Duration                  Midline Catheter Insertion/Assessment  - Single Lumen 01/12/23 2138 Right basilic vein (medial side of arm) 18g x 10cm 17 days                  Wounds: No; posterior incision   Wound care consulted: No

## 2023-01-30 NOTE — PROGRESS NOTES
[facility] - Neuro Critical Care  Infectious Disease  Progress Note    Patient Name: Rachel Zazueta  MRN: [id number]  Admission Date: 1/19/2023  Length of Stay: 10 days  Attending Physician: Eber Montoya DO  Primary Care Provider: Dannielle Merino DO    Isolation Status: Contact  Assessment/Plan:      Thoracic discitis  Thoracic discitis s/p laminectomy on 1/24. Operative cultures with E coli and ESBL E coli. Pending further surgical intervention on 2/1.   · Continue meropenem.   · Will follow up operative cultures.  · Will likely transition to ertapenem after her stage 2 intervention.           Anticipated Disposition: TBD    Thank you for your consult. I will follow-up with patient. Please contact us if you have any additional questions.    Efren Bellamy MD  Infectious Disease  [facility] - [facility] Critical Care    Subjective:     Principal Problem:Weakness of both lower extremities    HPI: Ms. Zazueta is a 42-year-old woman known to ID with hx of IVDU, IV drug use (methamphetamine), chronic HCV with cirrhosis, chronic pancytopenia, spinal fusion (04/2021), epidural abscess (GBS) with removal of hardware (02/2022), T10-pelvis spinal fusion with hardware (03/2022), obesity,  and neurogenic bladder who was admitted to [facility] on 12/23/22 with back pain and increasing weakness in the lower extremities.       Urine and blood cx from that admission + for ESBL E coli which was treated with ertapenem.  Repeat blood cx on 12/28 and 1/10 negative.   Patient started on physical therapy but weakness in the lower extremities increased, and the patient is no longer able to walk. She is able to urinate intermittently and defecate.      An MRI cervical spine 1/10 was significant  for multilevel spondylosis.  MRI brain with nonspecific findings.  Xray of spine notable for focal kyphosis at T9-10 increased when compared to prior scoliosis radiographs.  An MRI of the spine was not available due to the patient's size.  and she was transferred to [facility] for advanced imaging and Neurosurgery evaluation.      MRI  imaging here  on 1/20 concerning for worsening kyphosis, extensive edema in the paraspinal muscle. CT with spondylodiscitis T8-9, T9-10    Neurosurgery planning hardware removal and washout on 1/24    Of note, review of records show admission to [facility] 11/14/2022 to 11/22/2022 with pyogenic arthritis of the left shoulder, s/p I&D on 11/15 with cx positive for E Coli.  Utox was positive for methadone, opiates, amphetamines, and THC.  Seen by ID at [facility] who recommend IV ceftriaxone X 6 weeks at [facility], but patient was not agreeable to this.  Offered  daily ceftriaxone infusions at infusion center.  At discharge from there she had received 8 days of ceftriaxone. Review of notes show she was subsequently prescribed 4 weeks of Bactrim 2 DS tabs twice daily.       Interval History:     No adverse events    Review of Systems   Constitutional:  Negative for chills, fatigue, fever and unexpected weight change.   HENT:  Negative for ear pain, facial swelling, hearing loss, mouth sores, nosebleeds, rhinorrhea, sinus pressure, sore throat, tinnitus, trouble swallowing and voice change.    Eyes:  Negative for photophobia, pain, redness and visual disturbance.   Respiratory:  Negative for cough, chest tightness, shortness of breath and wheezing.    Cardiovascular:  Negative for chest pain, palpitations and leg swelling.   Gastrointestinal:  Negative for abdominal pain, blood in stool, constipation, diarrhea, nausea and vomiting.   Endocrine: Negative for cold intolerance, heat intolerance, polydipsia, polyphagia and polyuria.   Genitourinary:  Negative for decreased urine volume, dysuria, flank pain, frequency, hematuria, menstrual problem, urgency, vaginal bleeding, vaginal discharge and vaginal pain.   Musculoskeletal:  Positive for back pain and myalgias. Negative for arthralgias, joint swelling and neck pain.    Skin:  Negative for rash.   Allergic/Immunologic: Negative for environmental allergies, food allergies and immunocompromised state.   Neurological:  Negative for dizziness, seizures, syncope, weakness, light-headedness, numbness and headaches.   Hematological:  Negative for adenopathy. Does not bruise/bleed easily.   Psychiatric/Behavioral:  Negative for confusion, hallucinations, self-injury, sleep disturbance and suicidal ideas. The patient is not nervous/anxious.    Objective:     Vital Signs (Most Recent):  Temp: 97.9 °F (36.6 °C) (01/29/23 1501)  Pulse: 107 (01/29/23 1701)  Resp: 16 (01/29/23 1701)  BP: (!) 104/51 (01/29/23 1701)  SpO2: 97 % (01/29/23 1701)   Vital Signs (24h Range):  Temp:  [97.9 °F (36.6 °C)-98.7 °F (37.1 °C)] 97.9 °F (36.6 °C)  Pulse:  [102-118] 107  Resp:  [10-30] 16  SpO2:  [94 %-100 %] 97 %  BP: ()/(49-83) 104/51     Weight: 104 kg (229 lb 4.5 oz)  Body mass index is 35.91 kg/m².    Estimated Creatinine Clearance: 227.3 mL/min (A) (based on SCr of 0.4 mg/dL (L)).    Physical Exam  Vitals and nursing note reviewed.   Constitutional:       Appearance: She is well-developed.   HENT:      Head: Normocephalic and atraumatic.      Right Ear: External ear normal.      Left Ear: External ear normal.   Eyes:      General: No scleral icterus.        Right eye: No discharge.         Left eye: No discharge.      Conjunctiva/sclera: Conjunctivae normal.   Cardiovascular:      Rate and Rhythm: Normal rate and regular rhythm.      Heart sounds: Normal heart sounds. No murmur heard.    No friction rub. No gallop.   Pulmonary:      Effort: Pulmonary effort is normal. No respiratory distress.      Breath sounds: Normal breath sounds. No stridor. No wheezing or rales.   Abdominal:      General: Bowel sounds are normal.   Musculoskeletal:         General: No tenderness. Normal range of motion.   Skin:     General: Skin is warm and dry.   Neurological:      Mental Status: She is alert and oriented to  person, place, and time.       Significant Labs:   Microbiology Results (last 7 days)       Procedure Component Value Units Date/Time    Blood culture [[id number]] Collected: 01/27/23 0120    Order Status: Completed Specimen: Blood from Peripheral, Forearm, Right Updated: 01/29/23 0613     Blood Culture, Routine No Growth to date      No Growth to date      No Growth to date    Blood culture [[id number]] Collected: 01/27/23 0123    Order Status: Completed Specimen: Blood from Peripheral, Antecubital, Left Updated: 01/29/23 0613     Blood Culture, Routine No Growth to date      No Growth to date      No Growth to date    Aerobic culture [[id number]]  (Abnormal)  (Susceptibility) Collected: 01/24/23 0943    Order Status: Completed Specimen: Bone from Back Updated: 01/28/23 1145     Aerobic Bacterial Culture ESCHERICHIA COLI ESBL  From broth only      Narrative:      4) Perispinal    Aerobic culture [[id number]]  (Abnormal)  (Susceptibility) Collected: 01/24/23 0914    Order Status: Completed Specimen: Wound from Back Updated: 01/26/23 1306     Aerobic Bacterial Culture ESCHERICHIA COLI  Rare      Narrative:      1) Perispinal    Aerobic culture [[id number]]  (Abnormal)  (Susceptibility) Collected: 01/24/23 0914    Order Status: Completed Specimen: Wound from Back Updated: 01/26/23 1306     Aerobic Bacterial Culture ESCHERICHIA COLI ESBL  Few      Narrative:      2) Perispinal    Aerobic culture [[id number]]  (Abnormal)  (Susceptibility) Collected: 01/24/23 0943    Order Status: Completed Specimen: Bone from Back Updated: 01/26/23 1252     Aerobic Bacterial Culture ESCHERICHIA COLI ESBL  Few      Narrative:      3) Perispinal    Culture, Anaerobe [[id number]] Collected: 01/24/23 0943    Order Status: Completed Specimen: Bone from Back Updated: 01/26/23 1238     Anaerobic Culture Culture in progress    Narrative:      4) Perispinal    Culture, Anaerobe [[id number]] Collected: 01/24/23 0943    Order Status: Completed Specimen:  Bone from Back Updated: 01/26/23 1237     Anaerobic Culture Culture in progress    Narrative:      3) Perispinal    Culture, Anaerobe [[id number]] Collected: 01/24/23 0913    Order Status: Completed Specimen: Body Fluid from Back Updated: 01/26/23 1237     Anaerobic Culture Culture in progress    Narrative:      1) Perispinal    AFB Culture & Smear [[id number]] Collected: 01/24/23 0943    Order Status: Completed Specimen: Bone from Back Updated: 01/25/23 2127     AFB Culture & Smear Culture in progress     AFB CULTURE STAIN No acid fast bacilli seen.    Narrative:      4) Perispinal    AFB Culture & Smear [[id number]] Collected: 01/24/23 0943    Order Status: Completed Specimen: Bone from Back Updated: 01/25/23 2127     AFB Culture & Smear Culture in progress     AFB CULTURE STAIN No acid fast bacilli seen.    Narrative:      3) Perispinal    AFB Culture & Smear [[id number]] Collected: 01/24/23 0913    Order Status: Completed Specimen: Body Fluid from Back Updated: 01/25/23 2127     AFB Culture & Smear Culture in progress     AFB CULTURE STAIN No acid fast bacilli seen.    Narrative:      1) Perispinal    AFB Culture & Smear [[id number]] Collected: 01/24/23 0913    Order Status: Completed Specimen: Body Fluid from Back Updated: 01/25/23 2127     AFB Culture & Smear Culture in progress     AFB CULTURE STAIN No acid fast bacilli seen.    Narrative:      2) Perispinal    Blood culture [[id number]] Collected: 01/20/23 1131    Order Status: Completed Specimen: Blood Updated: 01/25/23 1412     Blood Culture, Routine No growth after 5 days.    Narrative:      Collection has been rescheduled by [facility] at 01/20/2023 11:03 Reason:   Patient unavailable in room with doctors   Collection has been rescheduled by [facility] at 01/20/2023 11:03 Reason:   Patient unavailable in room with doctors     Blood culture [[id number]] Collected: 01/20/23 1131    Order Status: Completed Specimen: Blood Updated: 01/25/23 1412     Blood Culture, Routine No  growth after 5 days.    Narrative:      Collection has been rescheduled by [facility] at 01/20/2023 11:03 Reason:   Patient unavailable in room with doctors   Collection has been rescheduled by [facility] at 01/20/2023 11:03 Reason:   Patient unavailable in room with doctors     Culture, Anaerobe [[id number]] Collected: 01/24/23 0913    Order Status: Completed Specimen: Body Fluid from Back Updated: 01/25/23 0658     Anaerobic Culture Culture in progress    Narrative:      2) Perispinal    Gram stain [[id number]] Collected: 01/24/23 0913    Order Status: Completed Specimen: Body Fluid from Back Updated: 01/24/23 1239     Gram Stain Result No WBC's      No organisms seen    Narrative:      1) Perispinal    Gram stain [[id number]] Collected: 01/24/23 0944    Order Status: Completed Specimen: Bone from Back Updated: 01/24/23 1238     Gram Stain Result Rare WBC's      No organisms seen    Narrative:      3) Perispinal    Gram stain [[id number]] Collected: 01/24/23 0913    Order Status: Completed Specimen: Body Fluid from Back Updated: 01/24/23 1237     Gram Stain Result Rare WBC's      No organisms seen    Narrative:      2) Perispinal    Gram stain [[id number]] Collected: 01/24/23 0943    Order Status: Completed Specimen: Bone from Back Updated: 01/24/23 1236     Gram Stain Result No WBC's      No organisms seen    Narrative:      4) Perispinal    Fungus culture [[id number]] Collected: 01/24/23 0943    Order Status: Sent Specimen: Bone from Back Updated: 01/24/23 1009    Fungus culture [[id number]] Collected: 01/24/23 0943    Order Status: Sent Specimen: Bone from Back Updated: 01/24/23 1007    Fungus culture [[id number]] Collected: 01/24/23 0913    Order Status: Sent Specimen: Body Fluid from Back Updated: 01/24/23 0929    Fungus culture [[id number]] Collected: 01/24/23 0913    Order Status: Sent Specimen: Body Fluid from Back Updated: 01/24/23 0927            Significant Imaging: I have reviewed all pertinent imaging results/findings  within the past 24 hours.

## 2023-01-31 LAB
ALBUMIN SERPL BCP-MCNC: 1.7 G/DL (ref 3.5–5.2)
ALP SERPL-CCNC: 108 U/L (ref 55–135)
ALT SERPL W/O P-5'-P-CCNC: 32 U/L (ref 10–44)
ANION GAP SERPL CALC-SCNC: 2 MMOL/L (ref 8–16)
AST SERPL-CCNC: 54 U/L (ref 10–40)
BASOPHILS # BLD AUTO: 0.02 K/UL (ref 0–0.2)
BASOPHILS NFR BLD: 0.7 % (ref 0–1.9)
BILIRUB SERPL-MCNC: 1.1 MG/DL (ref 0.1–1)
BUN SERPL-MCNC: 16 MG/DL (ref 6–20)
CALCIUM SERPL-MCNC: 8 MG/DL (ref 8.7–10.5)
CHLORIDE SERPL-SCNC: 104 MMOL/L (ref 95–110)
CO2 SERPL-SCNC: 29 MMOL/L (ref 23–29)
CREAT SERPL-MCNC: 0.4 MG/DL (ref 0.5–1.4)
DIFFERENTIAL METHOD: ABNORMAL
EOSINOPHIL # BLD AUTO: 0.1 K/UL (ref 0–0.5)
EOSINOPHIL NFR BLD: 3.9 % (ref 0–8)
ERYTHROCYTE [DISTWIDTH] IN BLOOD BY AUTOMATED COUNT: 20.3 % (ref 11.5–14.5)
EST. GFR  (NO RACE VARIABLE): >60 ML/MIN/1.73 M^2
GLUCOSE SERPL-MCNC: 100 MG/DL (ref 70–110)
HCT VFR BLD AUTO: 24.6 % (ref 37–48.5)
HGB BLD-MCNC: 7.3 G/DL (ref 12–16)
IMM GRANULOCYTES # BLD AUTO: 0.02 K/UL (ref 0–0.04)
IMM GRANULOCYTES NFR BLD AUTO: 0.7 % (ref 0–0.5)
LYMPHOCYTES # BLD AUTO: 0.5 K/UL (ref 1–4.8)
LYMPHOCYTES NFR BLD: 18.1 % (ref 18–48)
MAGNESIUM SERPL-MCNC: 1.8 MG/DL (ref 1.6–2.6)
MCH RBC QN AUTO: 28 PG (ref 27–31)
MCHC RBC AUTO-ENTMCNC: 29.7 G/DL (ref 32–36)
MCV RBC AUTO: 94 FL (ref 82–98)
MONOCYTES # BLD AUTO: 0.3 K/UL (ref 0.3–1)
MONOCYTES NFR BLD: 8.9 % (ref 4–15)
NEUTROPHILS # BLD AUTO: 1.9 K/UL (ref 1.8–7.7)
NEUTROPHILS NFR BLD: 67.7 % (ref 38–73)
NRBC BLD-RTO: 0 /100 WBC
PHOSPHATE SERPL-MCNC: 2.9 MG/DL (ref 2.7–4.5)
PLATELET # BLD AUTO: 76 K/UL (ref 150–450)
PMV BLD AUTO: 9.4 FL (ref 9.2–12.9)
POTASSIUM SERPL-SCNC: 4.3 MMOL/L (ref 3.5–5.1)
PROT SERPL-MCNC: 4.9 G/DL (ref 6–8.4)
RBC # BLD AUTO: 2.61 M/UL (ref 4–5.4)
SODIUM SERPL-SCNC: 135 MMOL/L (ref 136–145)
WBC # BLD AUTO: 2.81 K/UL (ref 3.9–12.7)

## 2023-01-31 PROCEDURE — 94799 UNLISTED PULMONARY SVC/PX: CPT

## 2023-01-31 PROCEDURE — 84100 ASSAY OF PHOSPHORUS: CPT | Performed by: STUDENT IN AN ORGANIZED HEALTH CARE EDUCATION/TRAINING PROGRAM

## 2023-01-31 PROCEDURE — 85025 COMPLETE CBC W/AUTO DIFF WBC: CPT

## 2023-01-31 PROCEDURE — 63600175 PHARM REV CODE 636 W HCPCS: Performed by: STUDENT IN AN ORGANIZED HEALTH CARE EDUCATION/TRAINING PROGRAM

## 2023-01-31 PROCEDURE — 83735 ASSAY OF MAGNESIUM: CPT | Performed by: STUDENT IN AN ORGANIZED HEALTH CARE EDUCATION/TRAINING PROGRAM

## 2023-01-31 PROCEDURE — 63600175 PHARM REV CODE 636 W HCPCS

## 2023-01-31 PROCEDURE — 25000003 PHARM REV CODE 250: Performed by: STUDENT IN AN ORGANIZED HEALTH CARE EDUCATION/TRAINING PROGRAM

## 2023-01-31 PROCEDURE — 27000221 HC OXYGEN, UP TO 24 HOURS

## 2023-01-31 PROCEDURE — 25000003 PHARM REV CODE 250: Performed by: INTERNAL MEDICINE

## 2023-01-31 PROCEDURE — A4216 STERILE WATER/SALINE, 10 ML: HCPCS | Performed by: PSYCHIATRY & NEUROLOGY

## 2023-01-31 PROCEDURE — 27000207 HC ISOLATION

## 2023-01-31 PROCEDURE — 94761 N-INVAS EAR/PLS OXIMETRY MLT: CPT

## 2023-01-31 PROCEDURE — 20000000 HC ICU ROOM

## 2023-01-31 PROCEDURE — 99233 SBSQ HOSP IP/OBS HIGH 50: CPT | Mod: ,,, | Performed by: INTERNAL MEDICINE

## 2023-01-31 PROCEDURE — 25000003 PHARM REV CODE 250: Performed by: PSYCHIATRY & NEUROLOGY

## 2023-01-31 PROCEDURE — 99233 PR SUBSEQUENT HOSPITAL CARE,LEVL III: ICD-10-PCS | Mod: ,,, | Performed by: PSYCHIATRY & NEUROLOGY

## 2023-01-31 PROCEDURE — 99233 SBSQ HOSP IP/OBS HIGH 50: CPT | Mod: ,,, | Performed by: PSYCHIATRY & NEUROLOGY

## 2023-01-31 PROCEDURE — 99233 PR SUBSEQUENT HOSPITAL CARE,LEVL III: ICD-10-PCS | Mod: ,,, | Performed by: INTERNAL MEDICINE

## 2023-01-31 PROCEDURE — 80053 COMPREHEN METABOLIC PANEL: CPT | Performed by: STUDENT IN AN ORGANIZED HEALTH CARE EDUCATION/TRAINING PROGRAM

## 2023-01-31 PROCEDURE — 99900035 HC TECH TIME PER 15 MIN (STAT)

## 2023-01-31 PROCEDURE — 25000003 PHARM REV CODE 250

## 2023-01-31 RX ORDER — OXYCODONE HYDROCHLORIDE 10 MG/1
10 TABLET ORAL EVERY 4 HOURS PRN
Status: DISCONTINUED | OUTPATIENT
Start: 2023-01-31 | End: 2023-02-09

## 2023-01-31 RX ADMIN — OXYCODONE HYDROCHLORIDE 10 MG: 10 TABLET ORAL at 08:01

## 2023-01-31 RX ADMIN — ACETAMINOPHEN 1000 MG: 500 TABLET ORAL at 08:01

## 2023-01-31 RX ADMIN — METHOCARBAMOL 1000 MG: 500 TABLET ORAL at 09:01

## 2023-01-31 RX ADMIN — Medication 10 ML: at 12:01

## 2023-01-31 RX ADMIN — Medication: at 03:01

## 2023-01-31 RX ADMIN — ACETAMINOPHEN 1000 MG: 500 TABLET ORAL at 02:01

## 2023-01-31 RX ADMIN — METHOCARBAMOL 1000 MG: 500 TABLET ORAL at 01:01

## 2023-01-31 RX ADMIN — Medication: at 02:01

## 2023-01-31 RX ADMIN — DIAZEPAM 5 MG: 5 TABLET ORAL at 08:01

## 2023-01-31 RX ADMIN — BUPROPION HYDROCHLORIDE 300 MG: 300 TABLET, FILM COATED, EXTENDED RELEASE ORAL at 08:01

## 2023-01-31 RX ADMIN — ONDANSETRON 4 MG: 2 INJECTION INTRAMUSCULAR; INTRAVENOUS at 12:01

## 2023-01-31 RX ADMIN — Medication 10 ML: at 06:01

## 2023-01-31 RX ADMIN — GABAPENTIN 600 MG: 300 CAPSULE ORAL at 08:01

## 2023-01-31 RX ADMIN — MEROPENEM 2 G: 1 INJECTION INTRAVENOUS at 08:01

## 2023-01-31 RX ADMIN — METHOCARBAMOL 1000 MG: 500 TABLET ORAL at 08:01

## 2023-01-31 RX ADMIN — OXYCODONE HYDROCHLORIDE 10 MG: 10 TABLET ORAL at 01:01

## 2023-01-31 RX ADMIN — MEROPENEM 2 G: 1 INJECTION INTRAVENOUS at 11:01

## 2023-01-31 RX ADMIN — OXYCODONE HYDROCHLORIDE 5 MG: 5 TABLET ORAL at 05:01

## 2023-01-31 RX ADMIN — PANTOPRAZOLE SODIUM 40 MG: 40 TABLET, DELAYED RELEASE ORAL at 08:01

## 2023-01-31 RX ADMIN — GABAPENTIN 600 MG: 300 CAPSULE ORAL at 02:01

## 2023-01-31 RX ADMIN — GABAPENTIN 600 MG: 300 CAPSULE ORAL at 09:01

## 2023-01-31 RX ADMIN — MEROPENEM 2 G: 1 INJECTION INTRAVENOUS at 03:01

## 2023-01-31 RX ADMIN — OXYCODONE HYDROCHLORIDE 5 MG: 5 TABLET ORAL at 02:01

## 2023-01-31 NOTE — SUBJECTIVE & OBJECTIVE
Interval History:   1/31: SAMRA, AFVSS, neuro stable.     Medications:  Continuous Infusions:   hydromorphone in 0.9 % NaCl 6 mg/30 ml       Scheduled Meds:   acetaminophen  1,000 mg Oral TID    buPROPion  300 mg Oral Daily    gabapentin  600 mg Oral TID    lactulose  20 g Oral TID    meropenem (MERREM) IVPB  2 g Intravenous Q8H    methocarbamoL  1,000 mg Oral QID    pantoprazole  40 mg Oral Daily    polyethylene glycol  17 g Oral BID    senna-docusate 8.6-50 mg  2 tablet Oral BID    sodium chloride 0.9%  10 mL Intravenous Q6H     PRN Meds:dextrose 10%, dextrose 10%, diazePAM, glucagon (human recombinant), glucose, glucose, hydrALAZINE, labetalol, magnesium hydroxide 400 mg/5 ml, magnesium oxide, magnesium oxide, melatonin, naloxone, ondansetron, oxyCODONE, polyethylene glycol, potassium bicarbonate, potassium bicarbonate, potassium bicarbonate, potassium, sodium phosphates, potassium, sodium phosphates, potassium, sodium phosphates, prochlorperazine, sodium chloride 0.9%, Flushing PICC Protocol **AND** sodium chloride 0.9% **AND** sodium chloride 0.9%     Review of Systems  Objective:     Weight: 129.5 kg (285 lb 7.9 oz)  Body mass index is 44.71 kg/m².  Vital Signs (Most Recent):  Temp: 98.5 °F (36.9 °C) (01/31/23 0305)  Pulse: 109 (01/31/23 0605)  Resp: 20 (01/31/23 0605)  BP: (!) 114/57 (01/31/23 0605)  SpO2: 100 % (01/31/23 0605) Vital Signs (24h Range):  Temp:  [98 °F (36.7 °C)-98.7 °F (37.1 °C)] 98.5 °F (36.9 °C)  Pulse:  [102-125] 109  Resp:  [12-36] 20  SpO2:  [95 %-100 %] 100 %  BP: ()/(51-68) 114/57                              Neurosurgery Physical Exam  General: well developed, well nourished, no distress.   Head: normocephalic, atraumatic  Neurologic: Alert and oriented. Thought content appropriate.  GCS: Motor: 6/Verbal: 5/Eyes: 4 GCS Total: 15  Mental Status: Awake, Alert, Oriented x 4  Language: No aphasia  Speech: No dysarthria  Cranial nerves: face symmetric, tongue midline, CN II-XII  grossly intact.   Eyes: pupils equal, round, reactive to light with accommodation, EOMI, nystagmus.   Pulmonary: normal respirations, no signs of respiratory distress  Abdomen: soft, non-distended, not tender to palpation  Skin: Skin is warm, dry and intact.  Sensory: intact to light touch throughout     Motor Strength:Moves all extremities spontaneously with good tone.  Full strength upper and extremities. No abnormal movements seen.      Strength   Deltoids Triceps Biceps Wrist Extension Wrist Flexion Hand    Upper: R 5/5 5/5 5/5 5/5 5/5 5/5     L 5/5 5/5 5/5 5/5 5/5 5/5       Iliopsoas Quadriceps Knee  Flexion Tibialis  anterior Gastro- cnemius EHL   Lower: R 3/5 4+/5 4+/5 4/5 4-/5 4/5     L 3/5 4+/5 4+/5 4/5 4-/5 4/5      Reflexes:   DTR: 2+ symmetrically throughout.  Praker's: Negative.  Babinski's: Present bilaterally  Clonus: 2 beats bilateral lower extremity     Incision well healed       Significant Labs:  Recent Labs   Lab 01/30/23  0243 01/31/23  0209   GLU 99 100   * 135*   K 4.2 4.3    104   CO2 26 29   BUN 17 16   CREATININE 0.5 0.4*   CALCIUM 7.8* 8.0*   MG 1.9 1.8       Recent Labs   Lab 01/29/23  0830 01/30/23  0243 01/31/23  0209   WBC 3.58* 2.90* 2.81*   HGB 7.5* 7.3* 7.3*   HCT 24.3* 24.4* 24.6*   PLT 91* 80* 76*       No results for input(s): LABPT, INR, APTT in the last 48 hours.    Microbiology Results (last 7 days)       Procedure Component Value Units Date/Time    Blood culture [[id number]] Collected: 01/27/23 0120    Order Status: Completed Specimen: Blood from Peripheral, Forearm, Right Updated: 01/31/23 0612     Blood Culture, Routine No Growth to date      No Growth to date      No Growth to date      No Growth to date      No Growth to date    Blood culture [[id number]] Collected: 01/27/23 0123    Order Status: Completed Specimen: Blood from Peripheral, Antecubital, Left Updated: 01/31/23 0612     Blood Culture, Routine No Growth to date      No Growth to date      No  Growth to date      No Growth to date      No Growth to date    Culture, Anaerobe [[id number]] Collected: 01/24/23 0943    Order Status: Completed Specimen: Bone from Back Updated: 01/30/23 0943     Anaerobic Culture No anaerobes isolated    Narrative:      4) Perispinal    Culture, Anaerobe [[id number]] Collected: 01/24/23 0943    Order Status: Completed Specimen: Bone from Back Updated: 01/30/23 0942     Anaerobic Culture No anaerobes isolated    Narrative:      3) Perispinal    Culture, Anaerobe [[id number]] Collected: 01/24/23 0913    Order Status: Completed Specimen: Body Fluid from Back Updated: 01/30/23 0942     Anaerobic Culture No anaerobes isolated    Narrative:      2) Perispinal    Culture, Anaerobe [[id number]] Collected: 01/24/23 0913    Order Status: Completed Specimen: Body Fluid from Back Updated: 01/30/23 0859     Anaerobic Culture No anaerobes isolated    Narrative:      1) Perispinal    Aerobic culture [[id number]]  (Abnormal)  (Susceptibility) Collected: 01/24/23 0943    Order Status: Completed Specimen: Bone from Back Updated: 01/28/23 1145     Aerobic Bacterial Culture ESCHERICHIA COLI ESBL  From broth only      Narrative:      4) Perispinal    Aerobic culture [[id number]]  (Abnormal)  (Susceptibility) Collected: 01/24/23 0914    Order Status: Completed Specimen: Wound from Back Updated: 01/26/23 1306     Aerobic Bacterial Culture ESCHERICHIA COLI  Rare      Narrative:      1) Perispinal    Aerobic culture [[id number]]  (Abnormal)  (Susceptibility) Collected: 01/24/23 0914    Order Status: Completed Specimen: Wound from Back Updated: 01/26/23 1306     Aerobic Bacterial Culture ESCHERICHIA COLI ESBL  Few      Narrative:      2) Perispinal    Aerobic culture [[id number]]  (Abnormal)  (Susceptibility) Collected: 01/24/23 0943    Order Status: Completed Specimen: Bone from Back Updated: 01/26/23 1252     Aerobic Bacterial Culture ESCHERICHIA COLI ESBL  Few      Narrative:      3) Perispinal    AFB Culture  & Smear [[id number]] Collected: 01/24/23 0943    Order Status: Completed Specimen: Bone from Back Updated: 01/25/23 2127     AFB Culture & Smear Culture in progress     AFB CULTURE STAIN No acid fast bacilli seen.    Narrative:      4) Perispinal    AFB Culture & Smear [[id number]] Collected: 01/24/23 0943    Order Status: Completed Specimen: Bone from Back Updated: 01/25/23 2127     AFB Culture & Smear Culture in progress     AFB CULTURE STAIN No acid fast bacilli seen.    Narrative:      3) Perispinal    AFB Culture & Smear [[id number]] Collected: 01/24/23 0913    Order Status: Completed Specimen: Body Fluid from Back Updated: 01/25/23 2127     AFB Culture & Smear Culture in progress     AFB CULTURE STAIN No acid fast bacilli seen.    Narrative:      1) Perispinal    AFB Culture & Smear [[id number]] Collected: 01/24/23 0913    Order Status: Completed Specimen: Body Fluid from Back Updated: 01/25/23 2127     AFB Culture & Smear Culture in progress     AFB CULTURE STAIN No acid fast bacilli seen.    Narrative:      2) Perispinal    Blood culture [[id number]] Collected: 01/20/23 1131    Order Status: Completed Specimen: Blood Updated: 01/25/23 1412     Blood Culture, Routine No growth after 5 days.    Narrative:      Collection has been rescheduled by 2 at 01/20/2023 11:03 Reason:   Patient unavailable in room with doctors   Collection has been rescheduled by 2 at 01/20/2023 11:03 Reason:   Patient unavailable in room with doctors     Blood culture [[id number]] Collected: 01/20/23 1131    Order Status: Completed Specimen: Blood Updated: 01/25/23 1412     Blood Culture, Routine No growth after 5 days.    Narrative:      Collection has been rescheduled by 2 at 01/20/2023 11:03 Reason:   Patient unavailable in room with doctors   Collection has been rescheduled by 2 at 01/20/2023 11:03 Reason:   Patient unavailable in room with doctors     Gram stain [[id number]] Collected: 01/24/23 0913    Order Status: Completed  Specimen: Body Fluid from Back Updated: 01/24/23 1239     Gram Stain Result No WBC's      No organisms seen    Narrative:      1) Perispinal    Gram stain [[id number]] Collected: 01/24/23 0944    Order Status: Completed Specimen: Bone from Back Updated: 01/24/23 1238     Gram Stain Result Rare WBC's      No organisms seen    Narrative:      3) Perispinal    Gram stain [[id number]] Collected: 01/24/23 0913    Order Status: Completed Specimen: Body Fluid from Back Updated: 01/24/23 1237     Gram Stain Result Rare WBC's      No organisms seen    Narrative:      2) Perispinal    Gram stain [[id number]] Collected: 01/24/23 0943    Order Status: Completed Specimen: Bone from Back Updated: 01/24/23 1236     Gram Stain Result No WBC's      No organisms seen    Narrative:      4) Perispinal    Fungus culture [[id number]] Collected: 01/24/23 0943    Order Status: Sent Specimen: Bone from Back Updated: 01/24/23 1009    Fungus culture [[id number]] Collected: 01/24/23 0943    Order Status: Sent Specimen: Bone from Back Updated: 01/24/23 1007    Fungus culture [[id number]] Collected: 01/24/23 0913    Order Status: Sent Specimen: Body Fluid from Back Updated: 01/24/23 0929    Fungus culture [[id number]] Collected: 01/24/23 0913    Order Status: Sent Specimen: Body Fluid from Back Updated: 01/24/23 0927          All pertinent labs from the last 24 hours have been reviewed.    Significant Diagnostics:  CT C-spine:  Vertebrae: The dens, lateral masses, and occipital condyles are intact.  There is no evidence of fracture or dislocation.     Discs: Multilevel disc height loss and degenerative endplate change.  Prominent C6 inferior endplate Schmorl's node, similar in appearance dating back to MRI from 02/14/2022.     Degenerative changes: Sent spinal canal dimensions at C3-C4 are 10.5 mm, 8.5 mm at C4-C5, 10.0 mm at C5-C6, and 10.8 mm at C6-C7 .  Multilevel uncovertebral spurring with multilevel neural foraminal narrowing most pronounced at  C5-C6 with moderate right neural foraminal narrowing and C6-C7 with moderate left neural foraminal narrowing.    Physical Exam  Neurosurgery Physical Exam

## 2023-01-31 NOTE — SUBJECTIVE & OBJECTIVE
Interval History:  See hospital course above.     Review of Systems   Constitutional:  Negative for fever.   HENT:  Negative for congestion and rhinorrhea.    Eyes:  Negative for visual disturbance.   Respiratory:  Negative for shortness of breath.    Cardiovascular:  Negative for chest pain.   Gastrointestinal:  Negative for abdominal distention, abdominal pain, nausea and vomiting.   Genitourinary:  Negative for dysuria and hematuria.   Musculoskeletal:  Positive for back pain. Negative for neck pain.   Neurological:  Positive for weakness. Negative for dizziness, seizures, light-headedness, numbness and headaches.     Objective:     Vitals:  Temp: 98.7 °F (37.1 °C)  Pulse: (!) 111  Rhythm: sinus tachycardia  BP: 96/60  MAP (mmHg): 74  Resp: (!) 26  ETCO2 (mmHg): 44 mmHg  SpO2: 99 %    Temp  Min: 98 °F (36.7 °C)  Max: 98.7 °F (37.1 °C)  Pulse  Min: 102  Max: 112  BP  Min: 88/51  Max: 129/58  MAP (mmHg)  Min: 67  Max: 89  Resp  Min: 12  Max: 37  ETCO2 (mmHg)  Min: 36 mmHg  Max: 45 mmHg  SpO2  Min: 95 %  Max: 100 %    01/30 0701 - 01/31 0700  In: 1148.1 [P.O.:540; I.V.:360]  Out: 1955 [Urine:1750; Drains:205]   Unmeasured Output  Urine Occurrence: 1  Stool Occurrence: 2  Pad Count: 4       Physical Exam  Vitals and nursing note reviewed.   Constitutional:       General: She is not in acute distress.     Appearance: She is obese. She is not ill-appearing, toxic-appearing or diaphoretic.   HENT:      Head: Normocephalic.      Mouth/Throat:      Mouth: Mucous membranes are moist.   Eyes:      General: No scleral icterus.        Right eye: No discharge.         Left eye: No discharge.   Cardiovascular:      Rate and Rhythm: Normal rate and regular rhythm.   Pulmonary:      Effort: Pulmonary effort is normal. No respiratory distress.      Comments: NC in place  Abdominal:      General: Abdomen is flat.      Palpations: Abdomen is soft.      Tenderness: There is no abdominal tenderness.   Musculoskeletal:         General:  No deformity.      Cervical back: No rigidity.   Skin:     General: Skin is warm and dry.      Coloration: Skin is not jaundiced.   Neurological:      Mental Status: She is alert and oriented to person, place, and time. Mental status is at baseline.      Motor: Weakness (Bilateral LE) and tremor (Bilateral UE) present.      Comments:   E4 V5 M6  Awake, alert, and oriented to self, time, place, and situation. Speech fluent. Answering all questions appropriately and following all commands briskly.   PERRL. Extraocular movements grossly intact. No facial asymmetry. Conversational hearing intact.   FOWLER spontaneously and follows commands. Strength 4/5 in BUE & 3/5 in BLE. Sensation intact to light touch in all 4 extremities.    Psychiatric:         Mood and Affect: Mood normal.         Behavior: Behavior normal.     Gait & coordination exams deferred.      Medications:  Continuoushydromorphone in 0.9 % NaCl 6 mg/30 ml  Scheduledacetaminophen, 1,000 mg, TID  buPROPion, 300 mg, Daily  gabapentin, 600 mg, TID  lactulose, 20 g, TID  meropenem (MERREM) IVPB, 2 g, Q8H  methocarbamoL, 1,000 mg, QID  pantoprazole, 40 mg, Daily  polyethylene glycol, 17 g, BID  senna-docusate 8.6-50 mg, 2 tablet, BID  sodium chloride 0.9%, 10 mL, Q6H  PRNdextrose 10%, 12.5 g, PRN  dextrose 10%, 25 g, PRN  diazePAM, 5 mg, Q6H PRN  glucagon (human recombinant), 1 mg, PRN  glucose, 16 g, PRN  glucose, 24 g, PRN  hydrALAZINE, 10 mg, Q6H PRN  labetalol, 10 mg, Q4H PRN  magnesium hydroxide 400 mg/5 ml, 30 mL, Daily PRN  magnesium oxide, 800 mg, PRN  magnesium oxide, 800 mg, PRN  melatonin, 6 mg, Nightly PRN  naloxone, 0.02 mg, PRN  ondansetron, 4 mg, Q6H PRN  oxyCODONE, 10 mg, Q4H PRN  polyethylene glycol, 17 g, Daily PRN  potassium bicarbonate, 35 mEq, PRN  potassium bicarbonate, 50 mEq, PRN  potassium bicarbonate, 60 mEq, PRN  potassium, sodium phosphates, 2 packet, PRN  potassium, sodium phosphates, 2 packet, PRN  potassium, sodium phosphates, 2  packet, PRN  prochlorperazine, 2.5 mg, Q6H PRN  sodium chloride 0.9%, 10 mL, Q12H PRN  sodium chloride 0.9%, 10 mL, PRN    Today I personally reviewed pertinent medications, lines/drains/airways, laboratory results, microbiology results, notably:      Laboratory:  CBC:  Recent Labs   Lab 01/31/23 0209   WBC 2.81*   RBC 2.61*   HGB 7.3*   HCT 24.6*   PLT 76*   MCV 94   MCH 28.0   MCHC 29.7*         CMP:  Recent Labs   Lab 01/31/23 0209   CALCIUM 8.0*   PROT 4.9*   *   K 4.3   CO2 29      BUN 16   CREATININE 0.4*   ALKPHOS 108   ALT 32   AST 54*   BILITOT 1.1*       Microbiology:  Microbiology Results (last 7 days)       Procedure Component Value Units Date/Time    Blood culture [[id number]] Collected: 01/27/23 0120    Order Status: Completed Specimen: Blood from Peripheral, Forearm, Right Updated: 01/31/23 0612     Blood Culture, Routine No Growth to date      No Growth to date      No Growth to date      No Growth to date      No Growth to date    Blood culture [[id number]] Collected: 01/27/23 0123    Order Status: Completed Specimen: Blood from Peripheral, Antecubital, Left Updated: 01/31/23 0612     Blood Culture, Routine No Growth to date      No Growth to date      No Growth to date      No Growth to date      No Growth to date    Culture, Anaerobe [[id number]] Collected: 01/24/23 0943    Order Status: Completed Specimen: Bone from Back Updated: 01/30/23 0943     Anaerobic Culture No anaerobes isolated    Narrative:      4) Perispinal    Culture, Anaerobe [[id number]] Collected: 01/24/23 0943    Order Status: Completed Specimen: Bone from Back Updated: 01/30/23 0942     Anaerobic Culture No anaerobes isolated    Narrative:      3) Perispinal    Culture, Anaerobe [[id number]] Collected: 01/24/23 0913    Order Status: Completed Specimen: Body Fluid from Back Updated: 01/30/23 0942     Anaerobic Culture No anaerobes isolated    Narrative:      2) Perispinal    Culture, Anaerobe [[id number]] Collected:  01/24/23 0913    Order Status: Completed Specimen: Body Fluid from Back Updated: 01/30/23 0859     Anaerobic Culture No anaerobes isolated    Narrative:      1) Perispinal    Aerobic culture [[id number]]  (Abnormal)  (Susceptibility) Collected: 01/24/23 0943    Order Status: Completed Specimen: Bone from Back Updated: 01/28/23 1145     Aerobic Bacterial Culture ESCHERICHIA COLI ESBL  From broth only      Narrative:      4) Perispinal    Aerobic culture [[id number]]  (Abnormal)  (Susceptibility) Collected: 01/24/23 0914    Order Status: Completed Specimen: Wound from Back Updated: 01/26/23 1306     Aerobic Bacterial Culture ESCHERICHIA COLI  Rare      Narrative:      1) Perispinal    Aerobic culture [[id number]]  (Abnormal)  (Susceptibility) Collected: 01/24/23 0914    Order Status: Completed Specimen: Wound from Back Updated: 01/26/23 1306     Aerobic Bacterial Culture ESCHERICHIA COLI ESBL  Few      Narrative:      2) Perispinal    Aerobic culture [[id number]]  (Abnormal)  (Susceptibility) Collected: 01/24/23 0943    Order Status: Completed Specimen: Bone from Back Updated: 01/26/23 1252     Aerobic Bacterial Culture ESCHERICHIA COLI ESBL  Few      Narrative:      3) Perispinal    AFB Culture & Smear [[id number]] Collected: 01/24/23 0943    Order Status: Completed Specimen: Bone from Back Updated: 01/25/23 2127     AFB Culture & Smear Culture in progress     AFB CULTURE STAIN No acid fast bacilli seen.    Narrative:      4) Perispinal    AFB Culture & Smear [[id number]] Collected: 01/24/23 0943    Order Status: Completed Specimen: Bone from Back Updated: 01/25/23 2127     AFB Culture & Smear Culture in progress     AFB CULTURE STAIN No acid fast bacilli seen.    Narrative:      3) Perispinal    AFB Culture & Smear [[id number]] Collected: 01/24/23 0913    Order Status: Completed Specimen: Body Fluid from Back Updated: 01/25/23 2127     AFB Culture & Smear Culture in progress     AFB CULTURE STAIN No acid fast bacilli seen.     Narrative:      1) Perispinal    AFB Culture & Smear [[id number]] Collected: 01/24/23 0913    Order Status: Completed Specimen: Body Fluid from Back Updated: 01/25/23 2127     AFB Culture & Smear Culture in progress     AFB CULTURE STAIN No acid fast bacilli seen.    Narrative:      2) Perispinal    Blood culture [[id number]] Collected: 01/20/23 1131    Order Status: Completed Specimen: Blood Updated: 01/25/23 1412     Blood Culture, Routine No growth after 5 days.    Narrative:      Collection has been rescheduled by [facility] at 01/20/2023 11:03 Reason:   Patient unavailable in room with doctors   Collection has been rescheduled by [facility] at 01/20/2023 11:03 Reason:   Patient unavailable in room with doctors     Blood culture [[id number]] Collected: 01/20/23 1131    Order Status: Completed Specimen: Blood Updated: 01/25/23 1412     Blood Culture, Routine No growth after 5 days.    Narrative:      Collection has been rescheduled by [facility] at 01/20/2023 11:03 Reason:   Patient unavailable in room with doctors   Collection has been rescheduled by [facility] at 01/20/2023 11:03 Reason:   Patient unavailable in room with doctors               Diet  Diet Adult Regular (IDDSI Level 7)  Diet Adult Regular (IDDSI Level 7)

## 2023-01-31 NOTE — PROGRESS NOTES
[facility]  Adult Nutrition  Consult Note    SUMMARY     Recommendations    1. Continue current regular diet.     2. Discontinue Boost Plus TID 2/2 PO intake of meals %.  - If PO intake consistently <50%, re-add Boost Plus BID (pt wants vanilla ONLY).     3. RD following.    Goals: Continue meeting % EEN/EPN by next RD f/u.  Nutrition Goal Status: new  Communication of RD Recs:  (POC)    Assessment and Plan    No nutrition dxn at this time    Reason for Assessment    Reason For Assessment: length of stay  Diagnosis:  (Weakness of BLEs)  Relevant Medical History: Cirrhosis of liver with ascites, hep C, substance use disorder (IV drug), spinal fusion (4/21)  Interdisciplinary Rounds: did not attend  General Information Comments: POD 7 laminectomy. RD visit for LOS x 12 days. Spoke with pt at bedside. States typically doesn't have a big appetite and endoreses decreased PO intake x 1 month 2/2 being in another hospital PTA. States intake of breakfast 75% with improved appetite. States intake of lunch of dinner depends on meal provided. States she is not always getting Boost sent on trays so she has ordered/brought her own case for when they do not bring up ONS. RD to round about issue. Spoke with pt about not needing Boost 2/2 PO intake of meals being % per RN documentation. RD provided pt with menu, explained order process, and collected food preferences. Issues with diarrhea 2/2 medications but no issues with n/v/c/chewing/swallowing. UBW fluctuates between 270-290# 2/2 fluid. #. NFPE not warranted 2/2 appearing well-nourished. LBM 1/30.  Nutrition Discharge Planning: Regular diet    Nutrition Risk Screen    Nutrition Risk Screen: no indicators present    Nutrition/Diet History    Food Preferences: Less chicken and more salads, vanilla Boost only  Spiritual, Cultural Beliefs, Evangelical Practices, Values that Affect Care: no  Food Allergies: NKFA  Factors Affecting  "Nutritional Intake: None identified at this time    Anthropometrics    Temp: 98.7 °F (37.1 °C)  Height Method: Stated  Height: 5' 7" (170.2 cm)  Height (inches): 67 in  Weight Method: Bed Scale  Weight: 129.5 kg (285 lb 7.9 oz)  Weight (lb): 285.5 lb  Ideal Body Weight (IBW), Female: 135 lb  % Ideal Body Weight, Female (lb): 211.48 %  BMI (Calculated): 44.7  BMI Grade: greater than 40 - morbid obesity    Lab/Procedures/Meds    Pertinent Labs Reviewed: reviewed  Pertinent Labs Comments: Albumin 1.7, Sodium 135, Ca 8.0, AST 54, Creatinine 0.4, T Bili 1.1, CRP 9.4, Cholesterol 68  Pertinent Medications Reviewed: reviewed  Pertinent Medications Comments: gabapentin, pantoprazole    Estimated/Assessed Needs    Weight Used For Calorie Calculations: 61.2 kg (135 lb)  Energy Calorie Requirements (kcal): 4464-5330 kcals  Energy Need Method: Kcal/kg (25-30 kcal/kg IBW)  Protein Requirements: 61 g (1.0 g/kg IBW)  Weight Used For Protein Calculations: 61.2 kg (135 lb)  Fluid Requirements (mL): 1 mL/kcal or fluid per MD  Estimated Fluid Requirement Method: RDA Method  RDA Method (mL): 1530    Nutrition Prescription Ordered    Current Diet Order: Regular  Oral Nutrition Supplement: Boost Plus TID    Evaluation of Received Nutrient/Fluid Intake    I/O: +4.7 L since admit  Energy Calories Required: meeting needs  Protein Required: meeting needs  Fluid Required: meeting needs  Tolerance: tolerating  % Intake of Estimated Energy Needs: 75 - 100 %  % Meal Intake: 75 - 100 %    Nutrition Risk    Level of Risk/Frequency of Follow-up:  (1 time/week)     Monitor and Evaluation    Food and Nutrient Intake: energy intake, food and beverage intake  Food and Nutrient Adminstration: diet order  Knowledge/Beliefs/Attitudes: food and nutrition knowledge/skill, beliefs and attitudes  Physical Activity and Function: nutrition-related ADLs and IADLs  Anthropometric Measurements: weight, height/length, weight change, body mass index  Biochemical " Data, Medical Tests and Procedures: gastrointestinal profile, electrolyte and renal panel, glucose/endocrine profile, lipid profile, inflammatory profile  Nutrition-Focused Physical Findings: overall appearance     Nutrition Follow-Up    RD Follow-up?: Yes    Ella Higginbotham, Registration Eligible, Provisional LDN

## 2023-01-31 NOTE — PROGRESS NOTES
[facility] Critical Care  Infectious Disease  Progress Note    Patient Name: Rachel Zazueta  MRN: [id number]  Admission Date: 1/19/2023  Length of Stay: 11 days  Attending Physician: Ace Rowley MD  Primary Care Provider: Dannielle Merino DO    Isolation Status: Contact  Assessment/Plan:      Thoracic discitis  Thoracic discitis s/p laminectomy on 1/24. Operative cultures with E coli and ESBL E coli. Pending further surgical intervention on 2/1.   · Continue meropenem.   · Will follow up operative cultures.  · Will likely transition to ertapenem after her stage 2 intervention.           Anticipated Disposition: TBD    Thank you for your consult. I will follow-up with patient. Please contact us if you have any additional questions.    Efren Bellamy MD  Infectious Disease  [facility] Critical Care    Subjective:     Principal Problem:Weakness of both lower extremities    HPI: Ms. Zazueta is a 42-year-old woman known to ID with hx of IVDU, IV drug use (methamphetamine), chronic HCV with cirrhosis, chronic pancytopenia, spinal fusion (04/2021), epidural abscess (GBS) with removal of hardware (02/2022), T10-pelvis spinal fusion with hardware (03/2022), obesity,  and neurogenic bladder who was admitted to [facility] on 12/23/22 with back pain and increasing weakness in the lower extremities.       Urine and blood cx from that admission + for ESBL E coli which was treated with ertapenem.  Repeat blood cx on 12/28 and 1/10 negative.   Patient started on physical therapy but weakness in the lower extremities increased, and the patient is no longer able to walk. She is able to urinate intermittently and defecate.      An MRI cervical spine 1/10 was significant  for multilevel spondylosis.  MRI brain with nonspecific findings.  Xray of spine notable for focal kyphosis at T9-10 increased when compared to prior scoliosis radiographs.  An MRI of the spine was not available due to the patient's  size. and she was transferred to [facility] for advanced imaging and Neurosurgery evaluation.      MRI  imaging here  on 1/20 concerning for worsening kyphosis, extensive edema in the paraspinal muscle. CT with spondylodiscitis T8-9, T9-10    Neurosurgery planning hardware removal and washout on 1/24    Of note, review of records show admission to [facility] 11/14/2022 to 11/22/2022 with pyogenic arthritis of the left shoulder, s/p I&D on 11/15 with cx positive for E Coli.  Utox was positive for methadone, opiates, amphetamines, and THC.  Seen by ID at [facility] who recommend IV ceftriaxone X 6 weeks at [facility], but patient was not agreeable to this.  Offered  daily ceftriaxone infusions at infusion center.  At discharge from there she had received 8 days of ceftriaxone. Review of notes show she was subsequently prescribed 4 weeks of Bactrim 2 DS tabs twice daily.       Interval History:     No adverse events.    Review of Systems   Constitutional:  Negative for chills, fatigue, fever and unexpected weight change.   HENT:  Negative for ear pain, facial swelling, hearing loss, mouth sores, nosebleeds, rhinorrhea, sinus pressure, sore throat, tinnitus, trouble swallowing and voice change.    Eyes:  Negative for photophobia, pain, redness and visual disturbance.   Respiratory:  Negative for cough, chest tightness, shortness of breath and wheezing.    Cardiovascular:  Negative for chest pain, palpitations and leg swelling.   Gastrointestinal:  Negative for abdominal pain, blood in stool, constipation, diarrhea, nausea and vomiting.   Endocrine: Negative for cold intolerance, heat intolerance, polydipsia, polyphagia and polyuria.   Genitourinary:  Negative for decreased urine volume, dysuria, flank pain, frequency, hematuria, menstrual problem, urgency, vaginal bleeding, vaginal discharge and vaginal pain.   Musculoskeletal:  Positive for back pain and myalgias. Negative for arthralgias, joint swelling and neck  pain.   Skin:  Negative for rash.   Allergic/Immunologic: Negative for environmental allergies, food allergies and immunocompromised state.   Neurological:  Negative for dizziness, seizures, syncope, weakness, light-headedness, numbness and headaches.   Hematological:  Negative for adenopathy. Does not bruise/bleed easily.   Psychiatric/Behavioral:  Negative for confusion, hallucinations, self-injury, sleep disturbance and suicidal ideas. The patient is not nervous/anxious.    Objective:     Vital Signs (Most Recent):  Temp: 98.3 °F (36.8 °C) (01/30/23 1905)  Pulse: 108 (01/30/23 2205)  Resp: (!) 36 (01/30/23 2205)  BP: (!) 88/51 (01/30/23 2205)  SpO2: 100 % (01/30/23 2205)   Vital Signs (24h Range):  Temp:  [98.3 °F (36.8 °C)-98.7 °F (37.1 °C)] 98.3 °F (36.8 °C)  Pulse:  [102-125] 108  Resp:  [10-36] 36  SpO2:  [95 %-100 %] 100 %  BP: ()/(51-70) 88/51     Weight: 129.5 kg (285 lb 7.9 oz)  Body mass index is 44.71 kg/m².    Estimated Creatinine Clearance: 205.5 mL/min (based on SCr of 0.5 mg/dL).    Physical Exam  Vitals and nursing note reviewed.   Constitutional:       Appearance: She is well-developed.   HENT:      Head: Normocephalic and atraumatic.      Right Ear: External ear normal.      Left Ear: External ear normal.   Eyes:      General: No scleral icterus.        Right eye: No discharge.         Left eye: No discharge.      Conjunctiva/sclera: Conjunctivae normal.   Cardiovascular:      Rate and Rhythm: Normal rate and regular rhythm.      Heart sounds: Normal heart sounds. No murmur heard.    No friction rub. No gallop.   Pulmonary:      Effort: Pulmonary effort is normal. No respiratory distress.      Breath sounds: Normal breath sounds. No stridor. No wheezing or rales.   Abdominal:      General: Bowel sounds are normal.   Musculoskeletal:         General: No tenderness. Normal range of motion.   Skin:     General: Skin is warm and dry.   Neurological:      Mental Status: She is alert and oriented  to person, place, and time.       Significant Labs:   Microbiology Results (last 7 days)       Procedure Component Value Units Date/Time    Culture, Anaerobe [[id number]] Collected: 01/24/23 0943    Order Status: Completed Specimen: Bone from Back Updated: 01/30/23 0943     Anaerobic Culture No anaerobes isolated    Narrative:      4) Perispinal    Culture, Anaerobe [[id number]] Collected: 01/24/23 0943    Order Status: Completed Specimen: Bone from Back Updated: 01/30/23 0942     Anaerobic Culture No anaerobes isolated    Narrative:      3) Perispinal    Culture, Anaerobe [[id number]] Collected: 01/24/23 0913    Order Status: Completed Specimen: Body Fluid from Back Updated: 01/30/23 0942     Anaerobic Culture No anaerobes isolated    Narrative:      2) Perispinal    Culture, Anaerobe [[id number]] Collected: 01/24/23 0913    Order Status: Completed Specimen: Body Fluid from Back Updated: 01/30/23 0859     Anaerobic Culture No anaerobes isolated    Narrative:      1) Perispinal    Blood culture [[id number]] Collected: 01/27/23 0120    Order Status: Completed Specimen: Blood from Peripheral, Forearm, Right Updated: 01/30/23 0612     Blood Culture, Routine No Growth to date      No Growth to date      No Growth to date      No Growth to date    Blood culture [[id number]] Collected: 01/27/23 0123    Order Status: Completed Specimen: Blood from Peripheral, Antecubital, Left Updated: 01/30/23 0612     Blood Culture, Routine No Growth to date      No Growth to date      No Growth to date      No Growth to date    Aerobic culture [[id number]]  (Abnormal)  (Susceptibility) Collected: 01/24/23 0943    Order Status: Completed Specimen: Bone from Back Updated: 01/28/23 1145     Aerobic Bacterial Culture ESCHERICHIA COLI ESBL  From broth only      Narrative:      4) Perispinal    Aerobic culture [[id number]]  (Abnormal)  (Susceptibility) Collected: 01/24/23 0914    Order Status: Completed Specimen: Wound from Back Updated: 01/26/23  1306     Aerobic Bacterial Culture ESCHERICHIA COLI  Rare      Narrative:      1) Perispinal    Aerobic culture [[id number]]  (Abnormal)  (Susceptibility) Collected: 01/24/23 0914    Order Status: Completed Specimen: Wound from Back Updated: 01/26/23 1306     Aerobic Bacterial Culture ESCHERICHIA COLI ESBL  Few      Narrative:      2) Perispinal    Aerobic culture [[id number]]  (Abnormal)  (Susceptibility) Collected: 01/24/23 0943    Order Status: Completed Specimen: Bone from Back Updated: 01/26/23 1252     Aerobic Bacterial Culture ESCHERICHIA COLI ESBL  Few      Narrative:      3) Perispinal    AFB Culture & Smear [[id number]] Collected: 01/24/23 0943    Order Status: Completed Specimen: Bone from Back Updated: 01/25/23 2127     AFB Culture & Smear Culture in progress     AFB CULTURE STAIN No acid fast bacilli seen.    Narrative:      4) Perispinal    AFB Culture & Smear [[id number]] Collected: 01/24/23 0943    Order Status: Completed Specimen: Bone from Back Updated: 01/25/23 2127     AFB Culture & Smear Culture in progress     AFB CULTURE STAIN No acid fast bacilli seen.    Narrative:      3) Perispinal    AFB Culture & Smear [[id number]] Collected: 01/24/23 0913    Order Status: Completed Specimen: Body Fluid from Back Updated: 01/25/23 2127     AFB Culture & Smear Culture in progress     AFB CULTURE STAIN No acid fast bacilli seen.    Narrative:      1) Perispinal    AFB Culture & Smear [[id number]] Collected: 01/24/23 0913    Order Status: Completed Specimen: Body Fluid from Back Updated: 01/25/23 2127     AFB Culture & Smear Culture in progress     AFB CULTURE STAIN No acid fast bacilli seen.    Narrative:      2) Perispinal    Blood culture [[id number]] Collected: 01/20/23 1131    Order Status: Completed Specimen: Blood Updated: 01/25/23 1412     Blood Culture, Routine No growth after 5 days.    Narrative:      Collection has been rescheduled by [facility] at 01/20/2023 11:03 Reason:   Patient unavailable in room with  doctors   Collection has been rescheduled by [facility] at 01/20/2023 11:03 Reason:   Patient unavailable in room with doctors     Blood culture [[id number]] Collected: 01/20/23 1131    Order Status: Completed Specimen: Blood Updated: 01/25/23 1412     Blood Culture, Routine No growth after 5 days.    Narrative:      Collection has been rescheduled by [facility] at 01/20/2023 11:03 Reason:   Patient unavailable in room with doctors   Collection has been rescheduled by [facility] at 01/20/2023 11:03 Reason:   Patient unavailable in room with doctors     Gram stain [[id number]] Collected: 01/24/23 0913    Order Status: Completed Specimen: Body Fluid from Back Updated: 01/24/23 1239     Gram Stain Result No WBC's      No organisms seen    Narrative:      1) Perispinal    Gram stain [[id number]] Collected: 01/24/23 0944    Order Status: Completed Specimen: Bone from Back Updated: 01/24/23 1238     Gram Stain Result Rare WBC's      No organisms seen    Narrative:      3) Perispinal    Gram stain [[id number]] Collected: 01/24/23 0913    Order Status: Completed Specimen: Body Fluid from Back Updated: 01/24/23 1237     Gram Stain Result Rare WBC's      No organisms seen    Narrative:      2) Perispinal    Gram stain [[id number]] Collected: 01/24/23 0943    Order Status: Completed Specimen: Bone from Back Updated: 01/24/23 1236     Gram Stain Result No WBC's      No organisms seen    Narrative:      4) Perispinal    Fungus culture [[id number]] Collected: 01/24/23 0943    Order Status: Sent Specimen: Bone from Back Updated: 01/24/23 1009    Fungus culture [[id number]] Collected: 01/24/23 0943    Order Status: Sent Specimen: Bone from Back Updated: 01/24/23 1007    Fungus culture [[id number]] Collected: 01/24/23 0913    Order Status: Sent Specimen: Body Fluid from Back Updated: 01/24/23 0929    Fungus culture [[id number]] Collected: 01/24/23 0913    Order Status: Sent Specimen: Body Fluid from Back Updated: 01/24/23 0927            Significant Imaging: I  have reviewed all pertinent imaging results/findings within the past 24 hours.

## 2023-01-31 NOTE — ASSESSMENT & PLAN NOTE
43 yo F w/ history of multiple spinal surgeries presented to OSH with possible infection of patient's shoulder and found to have UTI and E. Coli bacteremia and progressively worse B/L LE weakness. Transferred to [facility] for neurosurgical evaluation. Underwent Laminectomy T8-T10; Posterior spinal fusion T8-T12; hardware removal and washout. Admitted to [facility] for HLOC. Arrived to unit on PCA dilaudid pump. Patient is HDS on 3L NC.   - Admit to [facility]  - NSGY, ID following  - q2hr neuro checks, vitals, I/Os  - HV drains x 2 in place, management per NSGY  -Transfuse for Hg<7  - Cultures positive for ESBL, blood cultures with NGTD. WCTM  - Antibiotics per ID recs  - CMP, Mag, Phos, CBC daily  - MAP goals > 65, SBP < 180  - PRN labetalol, hydralazine  - MM pain regimen; PCA Dilaudid pump for pain management  - Patient will need to lie flat with HOB 15-20°   - Aggressive bowel regimen  - PT/OT as appropriate   - VTE prophylaxis: mechanical, hold chemical given pancytopenia  - plan to return to the OR on 2/2

## 2023-01-31 NOTE — PROGRESS NOTES
[facility] - Neuro Critical Care  Neurocritical Care  Progress Note    Admit Date: 1/19/2023  Service Date: 01/31/2023  Length of Stay: 12    Subjective:     Chief Complaint: Weakness of both lower extremities    History of Present Illness: 42-year-old female with a history of IV drug use, chronic hep C with cirrhosis, spinal fusion in April 2021, complicated by epidural abscess with removal of hardware February 2022 and against spinal fusion in March 2022 of T10 through the pelvis as well as neurogenic bladder who initially presented to [facility] in late December for generalized weakness, fatigue and bilateral flank pain.  Her course at [facility] was complicated by a E coli bacteremia thought to be due to a urinary source, she is completed a 7 day course of meropenem on 1/3, and had bcx from 1/2 that did not grow any bacteria.       She also had an ICU admission for ongoing back spasms and agitation requiring Precedex.  In early January patient started developing progressive lower extremity weakness greater in the left compared to the right.  She also had an MRI at the outside hospital that showed some white matter changes concerning for possible demyelinating disease.  However her thoracic spine radiographs also showed worsening of her kyphosis at T9-T10.  Due to the concern for either a demyelinating polyneuropathy, MS, or spinal cord compression the patient was transferred to [facility] for neurosurgical evaluation.       On admission the patient had a CT lumbar and thoracic spine and an MRI CTL spine. The MRI showed  focal kyphosis T8 through T10, with possible associated cord edema signal.  As well as extensive edema throughout the posterior paraspinal musculature concerning for possible infectious or inflammatory process.  There was concern that the findings at T8 through T10 could correspond with compressive myelopathy.  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  There were also erosive changes involving the sacroiliac joints bilaterally which was concerning for sacroiliitis and could be infectious.     Patient underwent laminectomy T8-T10; posterior fusion T8-T12; and washout by NSGY today. No complications noted during surgery. Patient is HDS and on 3L NC. Patient is currently on a PCA dilaudid pump.            Hospital Course: 1/25/2023 NAEO, pain well controlled on current regimen. Continue ABX. Maintain logroll precautions and HOB <30 until second stage of surgery.  01/26/2023 Hemoglobin 6.6, repeat 6.2. Increased drainage noted from bilateral hemovac. S/p 1u pRBC. PICC team consulted for long term access for antibiotics, remove IJ central line when PICC placed.   01/27/2023: NAEON. HV to gravity with decreased drainge. Hgb 7.2 this AM, will trend CBC q12h. Lytes replaced. Bowel regimen adjusted.   01/28/2023: NAEON. Received 1 unit PBRC yesterday. Hgb 7.4 this AM. Plan for OR 2/2.   01/29/2023: NAEON. HV drainage decreasing. Hgb stable. Plan for OR 2/2  01/30/2023  Stable exam. Significant solid BM's  01/31/2023  Exam stable, patient in much better spirits today      Interval History:  See hospital course above.     Review of Systems   Constitutional:  Negative for fever.   HENT:  Negative for congestion and rhinorrhea.    Eyes:  Negative for visual disturbance.   Respiratory:  Negative for shortness of breath.    Cardiovascular:  Negative for chest pain.   Gastrointestinal:  Negative for abdominal distention, abdominal pain, nausea and vomiting.   Genitourinary:  Negative for dysuria and hematuria.   Musculoskeletal:  Positive for back pain. Negative for neck pain.   Neurological:  Positive for weakness. Negative for dizziness, seizures, light-headedness, numbness and headaches.     Objective:     Vitals:  Temp: 98.7 °F (37.1 °C)  Pulse: (!) 111  Rhythm: sinus tachycardia  BP: 96/60  MAP  (mmHg): 74  Resp: (!) 26  ETCO2 (mmHg): 44 mmHg  SpO2: 99 %    Temp  Min: 98 °F (36.7 °C)  Max: 98.7 °F (37.1 °C)  Pulse  Min: 102  Max: 112  BP  Min: 88/51  Max: 129/58  MAP (mmHg)  Min: 67  Max: 89  Resp  Min: 12  Max: 37  ETCO2 (mmHg)  Min: 36 mmHg  Max: 45 mmHg  SpO2  Min: 95 %  Max: 100 %    01/30 0701 - 01/31 0700  In: 1148.1 [P.O.:540; I.V.:360]  Out: 1955 [Urine:1750; Drains:205]   Unmeasured Output  Urine Occurrence: 1  Stool Occurrence: 2  Pad Count: 4       Physical Exam  Vitals and nursing note reviewed.   Constitutional:       General: She is not in acute distress.     Appearance: She is obese. She is not ill-appearing, toxic-appearing or diaphoretic.   HENT:      Head: Normocephalic.      Mouth/Throat:      Mouth: Mucous membranes are moist.   Eyes:      General: No scleral icterus.        Right eye: No discharge.         Left eye: No discharge.   Cardiovascular:      Rate and Rhythm: Normal rate and regular rhythm.   Pulmonary:      Effort: Pulmonary effort is normal. No respiratory distress.      Comments: NC in place  Abdominal:      General: Abdomen is flat.      Palpations: Abdomen is soft.      Tenderness: There is no abdominal tenderness.   Musculoskeletal:         General: No deformity.      Cervical back: No rigidity.   Skin:     General: Skin is warm and dry.      Coloration: Skin is not jaundiced.   Neurological:      Mental Status: She is alert and oriented to person, place, and time. Mental status is at baseline.      Motor: Weakness (Bilateral LE) and tremor (Bilateral UE) present.      Comments:   E4 V5 M6  Awake, alert, and oriented to self, time, place, and situation. Speech fluent. Answering all questions appropriately and following all commands briskly.   PERRL. Extraocular movements grossly intact. No facial asymmetry. Conversational hearing intact.   FOWLER spontaneously and follows commands. Strength 4/5 in BUE & 3/5 in BLE. Sensation intact to light touch in all 4 extremities.     Psychiatric:         Mood and Affect: Mood normal.         Behavior: Behavior normal.     Gait & coordination exams deferred.      Medications:  Continuoushydromorphone in 0.9 % NaCl 6 mg/30 ml  Scheduledacetaminophen, 1,000 mg, TID  buPROPion, 300 mg, Daily  gabapentin, 600 mg, TID  lactulose, 20 g, TID  meropenem (MERREM) IVPB, 2 g, Q8H  methocarbamoL, 1,000 mg, QID  pantoprazole, 40 mg, Daily  polyethylene glycol, 17 g, BID  senna-docusate 8.6-50 mg, 2 tablet, BID  sodium chloride 0.9%, 10 mL, Q6H  PRNdextrose 10%, 12.5 g, PRN  dextrose 10%, 25 g, PRN  diazePAM, 5 mg, Q6H PRN  glucagon (human recombinant), 1 mg, PRN  glucose, 16 g, PRN  glucose, 24 g, PRN  hydrALAZINE, 10 mg, Q6H PRN  labetalol, 10 mg, Q4H PRN  magnesium hydroxide 400 mg/5 ml, 30 mL, Daily PRN  magnesium oxide, 800 mg, PRN  magnesium oxide, 800 mg, PRN  melatonin, 6 mg, Nightly PRN  naloxone, 0.02 mg, PRN  ondansetron, 4 mg, Q6H PRN  oxyCODONE, 10 mg, Q4H PRN  polyethylene glycol, 17 g, Daily PRN  potassium bicarbonate, 35 mEq, PRN  potassium bicarbonate, 50 mEq, PRN  potassium bicarbonate, 60 mEq, PRN  potassium, sodium phosphates, 2 packet, PRN  potassium, sodium phosphates, 2 packet, PRN  potassium, sodium phosphates, 2 packet, PRN  prochlorperazine, 2.5 mg, Q6H PRN  sodium chloride 0.9%, 10 mL, Q12H PRN  sodium chloride 0.9%, 10 mL, PRN    Today I personally reviewed pertinent medications, lines/drains/airways, laboratory results, microbiology results, notably:      Laboratory:  CBC:  Recent Labs   Lab 01/31/23  0209   WBC 2.81*   RBC 2.61*   HGB 7.3*   HCT 24.6*   PLT 76*   MCV 94   MCH 28.0   MCHC 29.7*         CMP:  Recent Labs   Lab 01/31/23  0209   CALCIUM 8.0*   PROT 4.9*   *   K 4.3   CO2 29      BUN 16   CREATININE 0.4*   ALKPHOS 108   ALT 32   AST 54*   BILITOT 1.1*       Microbiology:  Microbiology Results (last 7 days)       Procedure Component Value Units Date/Time    Blood culture [[id number]] Collected: 01/27/23  0120    Order Status: Completed Specimen: Blood from Peripheral, Forearm, Right Updated: 01/31/23 0612     Blood Culture, Routine No Growth to date      No Growth to date      No Growth to date      No Growth to date      No Growth to date    Blood culture [[id number]] Collected: 01/27/23 0123    Order Status: Completed Specimen: Blood from Peripheral, Antecubital, Left Updated: 01/31/23 0612     Blood Culture, Routine No Growth to date      No Growth to date      No Growth to date      No Growth to date      No Growth to date    Culture, Anaerobe [[id number]] Collected: 01/24/23 0943    Order Status: Completed Specimen: Bone from Back Updated: 01/30/23 0943     Anaerobic Culture No anaerobes isolated    Narrative:      4) Perispinal    Culture, Anaerobe [[id number]] Collected: 01/24/23 0943    Order Status: Completed Specimen: Bone from Back Updated: 01/30/23 0942     Anaerobic Culture No anaerobes isolated    Narrative:      3) Perispinal    Culture, Anaerobe [[id number]] Collected: 01/24/23 0913    Order Status: Completed Specimen: Body Fluid from Back Updated: 01/30/23 0942     Anaerobic Culture No anaerobes isolated    Narrative:      2) Perispinal    Culture, Anaerobe [[id number]] Collected: 01/24/23 0913    Order Status: Completed Specimen: Body Fluid from Back Updated: 01/30/23 0859     Anaerobic Culture No anaerobes isolated    Narrative:      1) Perispinal    Aerobic culture [[id number]]  (Abnormal)  (Susceptibility) Collected: 01/24/23 0943    Order Status: Completed Specimen: Bone from Back Updated: 01/28/23 1145     Aerobic Bacterial Culture ESCHERICHIA COLI ESBL  From broth only      Narrative:      4) Perispinal    Aerobic culture [[id number]]  (Abnormal)  (Susceptibility) Collected: 01/24/23 0914    Order Status: Completed Specimen: Wound from Back Updated: 01/26/23 1306     Aerobic Bacterial Culture ESCHERICHIA COLI  Rare      Narrative:      1) Perispinal    Aerobic culture [[id number]]  (Abnormal)   (Susceptibility) Collected: 01/24/23 0914    Order Status: Completed Specimen: Wound from Back Updated: 01/26/23 1306     Aerobic Bacterial Culture ESCHERICHIA COLI ESBL  Few      Narrative:      2) Perispinal    Aerobic culture [[id number]]  (Abnormal)  (Susceptibility) Collected: 01/24/23 0943    Order Status: Completed Specimen: Bone from Back Updated: 01/26/23 1252     Aerobic Bacterial Culture ESCHERICHIA COLI ESBL  Few      Narrative:      3) Perispinal    AFB Culture & Smear [[id number]] Collected: 01/24/23 0943    Order Status: Completed Specimen: Bone from Back Updated: 01/25/23 2127     AFB Culture & Smear Culture in progress     AFB CULTURE STAIN No acid fast bacilli seen.    Narrative:      4) Perispinal    AFB Culture & Smear [[id number]] Collected: 01/24/23 0943    Order Status: Completed Specimen: Bone from Back Updated: 01/25/23 2127     AFB Culture & Smear Culture in progress     AFB CULTURE STAIN No acid fast bacilli seen.    Narrative:      3) Perispinal    AFB Culture & Smear [[id number]] Collected: 01/24/23 0913    Order Status: Completed Specimen: Body Fluid from Back Updated: 01/25/23 2127     AFB Culture & Smear Culture in progress     AFB CULTURE STAIN No acid fast bacilli seen.    Narrative:      1) Perispinal    AFB Culture & Smear [[id number]] Collected: 01/24/23 0913    Order Status: Completed Specimen: Body Fluid from Back Updated: 01/25/23 2127     AFB Culture & Smear Culture in progress     AFB CULTURE STAIN No acid fast bacilli seen.    Narrative:      2) Perispinal    Blood culture [[id number]] Collected: 01/20/23 1131    Order Status: Completed Specimen: Blood Updated: 01/25/23 1412     Blood Culture, Routine No growth after 5 days.    Narrative:      Collection has been rescheduled by 2 at 01/20/2023 11:03 Reason:   Patient unavailable in room with doctors   Collection has been rescheduled by 2 at 01/20/2023 11:03 Reason:   Patient unavailable in room with doctors     Blood culture  [[id number]] Collected: 01/20/23 1131    Order Status: Completed Specimen: Blood Updated: 01/25/23 1412     Blood Culture, Routine No growth after 5 days.    Narrative:      Collection has been rescheduled by BH2 at 01/20/2023 11:03 Reason:   Patient unavailable in room with doctors   Collection has been rescheduled by BH2 at 01/20/2023 11:03 Reason:   Patient unavailable in room with doctors               Diet  Diet Adult Regular (IDDSI Level 7)  Diet Adult Regular (IDDSI Level 7)        Assessment/Plan:     Psychiatric  Anxiety and depression  Continue Buproprion for depression  Continue Lorazepam for anxiety  Continue Gabapentin for neuropathy and anxiety    Substance use disorder  Denies IV drug use (meth) for past 3 years.  Denies alcohol and tobacco abuse.       ID  Thoracic discitis  CT T/L spine thin cut 1/20 with haloing of pelvic and T10 screws, spondylodiscitis T8-9, T9-10  CT C spine with concern for discitis at C6-7  Patient is currently afebrile with chronic pancytopenia  Previously on Amoxicillin up until surgery for chronic suppressive therapy.  ID following  Blood cx (1/29) NGTD  Intra-operative cultures with E.coli ESBL  Continue Vancomycin and meropenem  Trend CBC daily  See above    GI  Chronic hepatitis C with cirrhosis  Hx of Hep C. See Cirrhosis of Liver    Cirrhosis of liver with ascites  Patient has reportedly refused transplant evaluation in the past.   -Daily CMP and trend LFTs  -Continue Lactulose 20g TID   -Will need GI/hepatology follow up outpatient    MELD-Na score: 18 at 1/26/2023 11:42 PM  MELD score: 13 at 1/26/2023 11:42 PM  Calculated from:  Serum Creatinine: 0.5 mg/dL (Using min of 1 mg/dL) at 1/26/2023 11:42 PM  Serum Sodium: 131 mmol/L at 1/26/2023 11:42 PM  Total Bilirubin: 1.9 mg/dL at 1/26/2023 11:42 PM  INR(ratio): 1.4 at 1/25/2023  1:08 AM  Age: 42 years    Orthopedic  * Weakness of both lower extremities  41 yo F w/ history of multiple spinal surgeries presented to OS  with possible infection of patient's shoulder and found to have UTI and E. Coli bacteremia and progressively worse B/L LE weakness. [place] [facility]. Underwent Laminectomy T8-T10; Posterior spinal fusion T8-T12; hardware removal and washout. Admitted to [facility] for HLOC. Arrived to unit on PCA dilaudid pump. Patient is HDS on 3L NC.   - Admit to [facility]U  - NSGY, ID following  - q2hr neuro checks, vitals, I/Os  - HV drains x 2 in place, management per NSGY  -Transfuse for Hg<7  - Cultures positive for ESBL, blood cultures with NGTD. WCTM  - Antibiotics per ID recs  - CMP, Mag, Phos, CBC daily  - MAP goals > 65, SBP < 180  - PRN labetalol, hydralazine  - MM pain regimen; PCA Dilaudid pump for pain management  - Patient will need to lie flat with HOB 15-20°   - Aggressive bowel regimen  - PT/OT as appropriate   - VTE prophylaxis: mechanical, hold chemical given pancytopenia  - plan to return to the OR on 2/2          The patient is being Prophylaxed for:  Venous Thromboembolism with: Mechanical  Stress Ulcer with: PPI  Ventilator Pneumonia with: not applicable    Activity Orders          Diet Adult Regular (IDDSI Level 7): Regular starting at 01/30 0852    Turn patient starting at 01/24 1800    Up in chair With meals starting at 01/24 1700    Progressive Mobility Protocol (mobilize patient to their highest level of functioning at least twice daily) starting at 01/24 1105    Elevate HOB 30 starting at 01/24 1105    Ambulate With Assistance, Post Op Day 0 If the patient arrives from PACU by 4PM, walk at least once on day of operation if alert and safe to do so. starting at 01/24 1104        Full Code    Ace Rowley MD  Neurocritical Care  [facility] - Neuro Critical Care    Level 3 of hospital care time was spent personally by me on the following activities: development of treatment plan with patient or surrogate and bedside caregivers, discussions with consultants, evaluation of patient's  response to treatment, examination of patient, ordering and performing treatments and interventions, ordering and review of laboratory studies, ordering and review of radiographic studies, pulse oximetry, antibiotic titration if applicable, vasopressor titration if applicable, re-evaluation of patient's condition.

## 2023-01-31 NOTE — SUBJECTIVE & OBJECTIVE
Interval History:     No adverse events.    Review of Systems   Constitutional:  Negative for chills, fatigue, fever and unexpected weight change.   HENT:  Negative for ear pain, facial swelling, hearing loss, mouth sores, nosebleeds, rhinorrhea, sinus pressure, sore throat, tinnitus, trouble swallowing and voice change.    Eyes:  Negative for photophobia, pain, redness and visual disturbance.   Respiratory:  Negative for cough, chest tightness, shortness of breath and wheezing.    Cardiovascular:  Negative for chest pain, palpitations and leg swelling.   Gastrointestinal:  Negative for abdominal pain, blood in stool, constipation, diarrhea, nausea and vomiting.   Endocrine: Negative for cold intolerance, heat intolerance, polydipsia, polyphagia and polyuria.   Genitourinary:  Negative for decreased urine volume, dysuria, flank pain, frequency, hematuria, menstrual problem, urgency, vaginal bleeding, vaginal discharge and vaginal pain.   Musculoskeletal:  Positive for back pain and myalgias. Negative for arthralgias, joint swelling and neck pain.   Skin:  Negative for rash.   Allergic/Immunologic: Negative for environmental allergies, food allergies and immunocompromised state.   Neurological:  Negative for dizziness, seizures, syncope, weakness, light-headedness, numbness and headaches.   Hematological:  Negative for adenopathy. Does not bruise/bleed easily.   Psychiatric/Behavioral:  Negative for confusion, hallucinations, self-injury, sleep disturbance and suicidal ideas. The patient is not nervous/anxious.    Objective:     Vital Signs (Most Recent):  Temp: 98.3 °F (36.8 °C) (01/30/23 1905)  Pulse: 108 (01/30/23 2205)  Resp: (!) 36 (01/30/23 2205)  BP: (!) 88/51 (01/30/23 2205)  SpO2: 100 % (01/30/23 2205)   Vital Signs (24h Range):  Temp:  [98.3 °F (36.8 °C)-98.7 °F (37.1 °C)] 98.3 °F (36.8 °C)  Pulse:  [102-125] 108  Resp:  [10-36] 36  SpO2:  [95 %-100 %] 100 %  BP: ()/(51-70) 88/51     Weight: 129.5 kg  (285 lb 7.9 oz)  Body mass index is 44.71 kg/m².    Estimated Creatinine Clearance: 205.5 mL/min (based on SCr of 0.5 mg/dL).    Physical Exam  Vitals and nursing note reviewed.   Constitutional:       Appearance: She is well-developed.   HENT:      Head: Normocephalic and atraumatic.      Right Ear: External ear normal.      Left Ear: External ear normal.   Eyes:      General: No scleral icterus.        Right eye: No discharge.         Left eye: No discharge.      Conjunctiva/sclera: Conjunctivae normal.   Cardiovascular:      Rate and Rhythm: Normal rate and regular rhythm.      Heart sounds: Normal heart sounds. No murmur heard.    No friction rub. No gallop.   Pulmonary:      Effort: Pulmonary effort is normal. No respiratory distress.      Breath sounds: Normal breath sounds. No stridor. No wheezing or rales.   Abdominal:      General: Bowel sounds are normal.   Musculoskeletal:         General: No tenderness. Normal range of motion.   Skin:     General: Skin is warm and dry.   Neurological:      Mental Status: She is alert and oriented to person, place, and time.       Significant Labs:   Microbiology Results (last 7 days)       Procedure Component Value Units Date/Time    Culture, Anaerobe [[id number]] Collected: 01/24/23 0943    Order Status: Completed Specimen: Bone from Back Updated: 01/30/23 0943     Anaerobic Culture No anaerobes isolated    Narrative:      4) Perispinal    Culture, Anaerobe [[id number]] Collected: 01/24/23 0943    Order Status: Completed Specimen: Bone from Back Updated: 01/30/23 0942     Anaerobic Culture No anaerobes isolated    Narrative:      3) Perispinal    Culture, Anaerobe [[id number]] Collected: 01/24/23 0913    Order Status: Completed Specimen: Body Fluid from Back Updated: 01/30/23 0942     Anaerobic Culture No anaerobes isolated    Narrative:      2) Perispinal    Culture, Anaerobe [[id number]] Collected: 01/24/23 0913    Order Status: Completed Specimen: Body Fluid from Back  Updated: 01/30/23 0859     Anaerobic Culture No anaerobes isolated    Narrative:      1) Perispinal    Blood culture [[id number]] Collected: 01/27/23 0120    Order Status: Completed Specimen: Blood from Peripheral, Forearm, Right Updated: 01/30/23 0612     Blood Culture, Routine No Growth to date      No Growth to date      No Growth to date      No Growth to date    Blood culture [[id number]] Collected: 01/27/23 0123    Order Status: Completed Specimen: Blood from Peripheral, Antecubital, Left Updated: 01/30/23 0612     Blood Culture, Routine No Growth to date      No Growth to date      No Growth to date      No Growth to date    Aerobic culture [[id number]]  (Abnormal)  (Susceptibility) Collected: 01/24/23 0943    Order Status: Completed Specimen: Bone from Back Updated: 01/28/23 1145     Aerobic Bacterial Culture ESCHERICHIA COLI ESBL  From broth only      Narrative:      4) Perispinal    Aerobic culture [[id number]]  (Abnormal)  (Susceptibility) Collected: 01/24/23 0914    Order Status: Completed Specimen: Wound from Back Updated: 01/26/23 1306     Aerobic Bacterial Culture ESCHERICHIA COLI  Rare      Narrative:      1) Perispinal    Aerobic culture [[id number]]  (Abnormal)  (Susceptibility) Collected: 01/24/23 0914    Order Status: Completed Specimen: Wound from Back Updated: 01/26/23 1306     Aerobic Bacterial Culture ESCHERICHIA COLI ESBL  Few      Narrative:      2) Perispinal    Aerobic culture [[id number]]  (Abnormal)  (Susceptibility) Collected: 01/24/23 0943    Order Status: Completed Specimen: Bone from Back Updated: 01/26/23 1252     Aerobic Bacterial Culture ESCHERICHIA COLI ESBL  Few      Narrative:      3) Perispinal    AFB Culture & Smear [[id number]] Collected: 01/24/23 0943    Order Status: Completed Specimen: Bone from Back Updated: 01/25/23 2127     AFB Culture & Smear Culture in progress     AFB CULTURE STAIN No acid fast bacilli seen.    Narrative:      4) Perispinal    AFB Culture & Smear  [[id number]] Collected: 01/24/23 0943    Order Status: Completed Specimen: Bone from Back Updated: 01/25/23 2127     AFB Culture & Smear Culture in progress     AFB CULTURE STAIN No acid fast bacilli seen.    Narrative:      3) Perispinal    AFB Culture & Smear [[id number]] Collected: 01/24/23 0913    Order Status: Completed Specimen: Body Fluid from Back Updated: 01/25/23 2127     AFB Culture & Smear Culture in progress     AFB CULTURE STAIN No acid fast bacilli seen.    Narrative:      1) Perispinal    AFB Culture & Smear [[id number]] Collected: 01/24/23 0913    Order Status: Completed Specimen: Body Fluid from Back Updated: 01/25/23 2127     AFB Culture & Smear Culture in progress     AFB CULTURE STAIN No acid fast bacilli seen.    Narrative:      2) Perispinal    Blood culture [[id number]] Collected: 01/20/23 1131    Order Status: Completed Specimen: Blood Updated: 01/25/23 1412     Blood Culture, Routine No growth after 5 days.    Narrative:      Collection has been rescheduled by [facility] at 01/20/2023 11:03 Reason:   Patient unavailable in room with doctors   Collection has been rescheduled by [facility] at 01/20/2023 11:03 Reason:   Patient unavailable in room with doctors     Blood culture [[id number]] Collected: 01/20/23 1131    Order Status: Completed Specimen: Blood Updated: 01/25/23 1412     Blood Culture, Routine No growth after 5 days.    Narrative:      Collection has been rescheduled by [facility] at 01/20/2023 11:03 Reason:   Patient unavailable in room with doctors   Collection has been rescheduled by [facility] at 01/20/2023 11:03 Reason:   Patient unavailable in room with doctors     Gram stain [[id number]] Collected: 01/24/23 0913    Order Status: Completed Specimen: Body Fluid from Back Updated: 01/24/23 1239     Gram Stain Result No WBC's      No organisms seen    Narrative:      1) Perispinal    Gram stain [[id number]] Collected: 01/24/23 0944    Order Status: Completed Specimen: Bone from Back Updated: 01/24/23 1238      Gram Stain Result Rare WBC's      No organisms seen    Narrative:      3) Perispinal    Gram stain [[id number]] Collected: 01/24/23 0913    Order Status: Completed Specimen: Body Fluid from Back Updated: 01/24/23 1237     Gram Stain Result Rare WBC's      No organisms seen    Narrative:      2) Perispinal    Gram stain [[id number]] Collected: 01/24/23 0943    Order Status: Completed Specimen: Bone from Back Updated: 01/24/23 1236     Gram Stain Result No WBC's      No organisms seen    Narrative:      4) Perispinal    Fungus culture [[id number]] Collected: 01/24/23 0943    Order Status: Sent Specimen: Bone from Back Updated: 01/24/23 1009    Fungus culture [[id number]] Collected: 01/24/23 0943    Order Status: Sent Specimen: Bone from Back Updated: 01/24/23 1007    Fungus culture [[id number]] Collected: 01/24/23 0913    Order Status: Sent Specimen: Body Fluid from Back Updated: 01/24/23 0929    Fungus culture [[id number]] Collected: 01/24/23 0913    Order Status: Sent Specimen: Body Fluid from Back Updated: 01/24/23 0927            Significant Imaging: I have reviewed all pertinent imaging results/findings within the past 24 hours.

## 2023-01-31 NOTE — SUBJECTIVE & OBJECTIVE
Interval History: NAOEN    Review of Systems   HENT:  Negative for trouble swallowing.    Eyes:  Negative for visual disturbance.   Respiratory:  Negative for cough, chest tightness and shortness of breath.    Gastrointestinal:  Negative for blood in stool and vomiting.   Genitourinary:  Negative for dysuria, hematuria and urgency.   Musculoskeletal:  Positive for back pain and myalgias.   Psychiatric/Behavioral:  Negative for confusion and decreased concentration.      Objective:     Vital Signs (Most Recent):  Temp: 98.4 °F (36.9 °C) (01/31/23 1505)  Pulse: (!) 111 (01/31/23 1805)  Resp: (!) 23 (01/31/23 1805)  BP: (!) 110/57 (01/31/23 1805)  SpO2: 96 % (01/31/23 1805)   Vital Signs (24h Range):  Temp:  [98 °F (36.7 °C)-98.7 °F (37.1 °C)] 98.4 °F (36.9 °C)  Pulse:  [102-112] 111  Resp:  [12-37] 23  SpO2:  [95 %-100 %] 96 %  BP: ()/(49-67) 110/57     Weight: 129.5 kg (285 lb 7.9 oz)  Body mass index is 44.71 kg/m².    Estimated Creatinine Clearance: 256.8 mL/min (A) (based on SCr of 0.4 mg/dL (L)).    Physical Exam  Constitutional:       Appearance: Normal appearance. She is obese. She is ill-appearing. She is not toxic-appearing.   HENT:      Head: Normocephalic.      Nose: Nose normal.      Mouth/Throat:      Mouth: Mucous membranes are moist.      Pharynx: Oropharynx is clear.   Eyes:      General: No scleral icterus.     Conjunctiva/sclera: Conjunctivae normal.   Cardiovascular:      Rate and Rhythm: Normal rate and regular rhythm.      Pulses: Normal pulses.      Heart sounds: Normal heart sounds.   Pulmonary:      Effort: Pulmonary effort is normal.      Breath sounds: Normal breath sounds.   Abdominal:      General: Bowel sounds are normal.      Palpations: Abdomen is soft.   Musculoskeletal:         General: No tenderness or deformity.      Cervical back: Normal range of motion and neck supple.      Comments: Posterior suction drains with serosanguinous drainage   Skin:     General: Skin is warm and  dry.      Comments: Intravascular catheter site RUE c/d/i   Neurological:      Mental Status: She is alert and oriented to person, place, and time. Mental status is at baseline.   Psychiatric:         Behavior: Behavior normal.         Thought Content: Thought content normal.       Significant Labs: Blood Culture:   Recent Labs   Lab 01/10/23  2344 01/10/23  2348 01/20/23  1131 01/27/23  0120 01/27/23 0123   LABBLOO No growth after 5 days. No growth after 5 days. No growth after 5 days.  No growth after 5 days. No Growth to date  No Growth to date  No Growth to date  No Growth to date  No Growth to date No Growth to date  No Growth to date  No Growth to date  No Growth to date  No Growth to date     BMP:   Recent Labs   Lab 01/31/23 0209      *   K 4.3      CO2 29   BUN 16   CREATININE 0.4*   CALCIUM 8.0*   MG 1.8     CBC:   Recent Labs   Lab 01/30/23  0243 01/31/23  0209   WBC 2.90* 2.81*   HGB 7.3* 7.3*   HCT 24.4* 24.6*   PLT 80* 76*     CMP:   Recent Labs   Lab 01/30/23  0243 01/31/23  0209   * 135*   K 4.2 4.3    104   CO2 26 29   GLU 99 100   BUN 17 16   CREATININE 0.5 0.4*   CALCIUM 7.8* 8.0*   PROT 4.9* 4.9*   ALBUMIN 1.8* 1.7*   BILITOT 1.4* 1.1*   ALKPHOS 113 108   AST 57* 54*   ALT 31 32   ANIONGAP 5* 2*     Microbiology Results (last 7 days)       Procedure Component Value Units Date/Time    Blood culture [[id number]] Collected: 01/27/23 0120    Order Status: Completed Specimen: Blood from Peripheral, Forearm, Right Updated: 01/31/23 0612     Blood Culture, Routine No Growth to date      No Growth to date      No Growth to date      No Growth to date      No Growth to date    Blood culture [[id number]] Collected: 01/27/23 0123    Order Status: Completed Specimen: Blood from Peripheral, Antecubital, Left Updated: 01/31/23 0612     Blood Culture, Routine No Growth to date      No Growth to date      No Growth to date      No Growth to date      No Growth to date     Culture, Anaerobe [[id number]] Collected: 01/24/23 0943    Order Status: Completed Specimen: Bone from Back Updated: 01/30/23 0943     Anaerobic Culture No anaerobes isolated    Narrative:      4) Perispinal    Culture, Anaerobe [[id number]] Collected: 01/24/23 0943    Order Status: Completed Specimen: Bone from Back Updated: 01/30/23 0942     Anaerobic Culture No anaerobes isolated    Narrative:      3) Perispinal    Culture, Anaerobe [[id number]] Collected: 01/24/23 0913    Order Status: Completed Specimen: Body Fluid from Back Updated: 01/30/23 0942     Anaerobic Culture No anaerobes isolated    Narrative:      2) Perispinal    Culture, Anaerobe [[id number]] Collected: 01/24/23 0913    Order Status: Completed Specimen: Body Fluid from Back Updated: 01/30/23 0859     Anaerobic Culture No anaerobes isolated    Narrative:      1) Perispinal    Aerobic culture [[id number]]  (Abnormal)  (Susceptibility) Collected: 01/24/23 0943    Order Status: Completed Specimen: Bone from Back Updated: 01/28/23 1145     Aerobic Bacterial Culture ESCHERICHIA COLI ESBL  From broth only      Narrative:      4) Perispinal    Aerobic culture [[id number]]  (Abnormal)  (Susceptibility) Collected: 01/24/23 0914    Order Status: Completed Specimen: Wound from Back Updated: 01/26/23 1306     Aerobic Bacterial Culture ESCHERICHIA COLI  Rare      Narrative:      1) Perispinal    Aerobic culture [[id number]]  (Abnormal)  (Susceptibility) Collected: 01/24/23 0914    Order Status: Completed Specimen: Wound from Back Updated: 01/26/23 1306     Aerobic Bacterial Culture ESCHERICHIA COLI ESBL  Few      Narrative:      2) Perispinal    Aerobic culture [[id number]]  (Abnormal)  (Susceptibility) Collected: 01/24/23 0943    Order Status: Completed Specimen: Bone from Back Updated: 01/26/23 1252     Aerobic Bacterial Culture ESCHERICHIA COLI ESBL  Few      Narrative:      3) Perispinal    AFB Culture & Smear [[id number]] Collected: 01/24/23 0943    Order Status:  Completed Specimen: Bone from Back Updated: 01/25/23 2127     AFB Culture & Smear Culture in progress     AFB CULTURE STAIN No acid fast bacilli seen.    Narrative:      4) Perispinal    AFB Culture & Smear [[id number]] Collected: 01/24/23 0943    Order Status: Completed Specimen: Bone from Back Updated: 01/25/23 2127     AFB Culture & Smear Culture in progress     AFB CULTURE STAIN No acid fast bacilli seen.    Narrative:      3) Perispinal    AFB Culture & Smear [[id number]] Collected: 01/24/23 0913    Order Status: Completed Specimen: Body Fluid from Back Updated: 01/25/23 2127     AFB Culture & Smear Culture in progress     AFB CULTURE STAIN No acid fast bacilli seen.    Narrative:      1) Perispinal    AFB Culture & Smear [[id number]] Collected: 01/24/23 0913    Order Status: Completed Specimen: Body Fluid from Back Updated: 01/25/23 2127     AFB Culture & Smear Culture in progress     AFB CULTURE STAIN No acid fast bacilli seen.    Narrative:      2) Perispinal    Blood culture [[id number]] Collected: 01/20/23 1131    Order Status: Completed Specimen: Blood Updated: 01/25/23 1412     Blood Culture, Routine No growth after 5 days.    Narrative:      Collection has been rescheduled by [facility] at 01/20/2023 11:03 Reason:   Patient unavailable in room with doctors   Collection has been rescheduled by 2 at 01/20/2023 11:03 Reason:   Patient unavailable in room with doctors     Blood culture [[id number]] Collected: 01/20/23 1131    Order Status: Completed Specimen: Blood Updated: 01/25/23 1412     Blood Culture, Routine No growth after 5 days.    Narrative:      Collection has been rescheduled by 2 at 01/20/2023 11:03 Reason:   Patient unavailable in room with doctors   Collection has been rescheduled by 2 at 01/20/2023 11:03 Reason:   Patient unavailable in room with doctors           All pertinent labs within the past 24 hours have been reviewed.  Recent Lab Results         01/31/23  0209        Albumin 1.7        Alkaline Phosphatase 108       ALT 32       Anion Gap 2       AST 54       Baso # 0.02       Basophil % 0.7       BILIRUBIN TOTAL 1.1  Comment: For infants and newborns, interpretation of results should be based  on gestational age, weight and in agreement with clinical  observations.    Premature Infant recommended reference ranges:  Up to 24 hours.............<8.0 mg/dL  Up to 48 hours............<12.0 mg/dL  3-5 days..................<15.0 mg/dL  6-29 days.................<15.0 mg/dL         BUN 16       Calcium 8.0       Chloride 104       CO2 29       Creatinine 0.4       Differential Method Automated       eGFR >60.0       Eos # 0.1       Eosinophil % 3.9       Glucose 100       Gran # (ANC) 1.9       Gran % 67.7       Hematocrit 24.6       Hemoglobin 7.3       Immature Grans (Abs) 0.02  Comment: Mild elevation in immature granulocytes is non specific and   can be seen in a variety of conditions including stress response,   acute inflammation, trauma and pregnancy. Correlation with other   laboratory and clinical findings is essential.         Immature Granulocytes 0.7       Lymph # 0.5       Lymph % 18.1       Magnesium 1.8       MCH 28.0       MCHC 29.7       MCV 94       Mono # 0.3       Mono % 8.9       MPV 9.4       nRBC 0       Phosphorus 2.9       Platelets 76       Potassium 4.3       PROTEIN TOTAL 4.9       RBC 2.61       RDW 20.3       Sodium 135       WBC 2.81               Significant Imaging: I have reviewed all pertinent imaging results/findings within the past 24 hours.

## 2023-01-31 NOTE — ASSESSMENT & PLAN NOTE
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 Transferred to [facility] to  and neurosurgery was consulted    OR yesterday for temporary T6-12 PSIF. Taz pus noted intraoperatively.   Given intraoperative findings, new operative plan for T4-pelvis revision and extension of fusion with T9-10 corpectomy with plastic surgery assistance planned for 2/2/23    Plan:  Admitted to ICU, Keep in ICU on logroll precautions until stage 2 Thursday.    -- MRI C/T/L spine 1/20 with C7 SP enhancement, focal kyphosis at T8-10 with severe anterior height loss at T9, enhancing C6 inferior endplate Schmorl's node  -- CT T/L spine thin cut 1/20 with haloing of pelvic and T10 screws, spondylodiscitis T8-9, T9-10  -- CT C spine with concern for discitis at C6-7  -- flex ext XR done, limited view of C6-7 in swimmers view 2/2 shoulders  -- 1/20 ESR 70, CRP 9.4. elevated from prior   -- TLSO at bedside  --HV x2 to gravity, WV in place, monitor ouput  --ID: Vanc/merropenam   -- BCx 1/20: NGTD   -- OR cultures 1/24: E. coli  --OR 2/2/23 as above   -- will obtain informed consent   -- NPO 2/1/23 midnight  -- multimodal pain control per primary medicine team  NSGY will continue to follow. Please contact team with any further questions.

## 2023-01-31 NOTE — PLAN OF CARE
[facility] Care Plan    POC reviewed with Rachel Zazueta at 0500. Pt verbalized understanding. Questions and concerns addressed. No acute events overnight. Pt progressing toward goals. Will continue to monitor. See below and flowsheets for full assessment and VS info.   - PRN oxy given x4; PCA pump continued  - PRN zofran given x1  - R HV drain remains to gravity; total output: 100cc  - L HV drain remains to full suction; total output: 40cc  - BM x2 overnight   - Bath given, linens changed               Is this a stroke patient? no    Neuro:  [place] Coma Scale  Best Eye Response: 4-->(E4) spontaneous  Best Motor Response: 6-->(M6) obeys commands  Best Verbal Response: 5-->(V5) oriented  Jane Coma Scale Score: 15  Assessment Qualifiers: patient not sedated/intubated  Pupil PERRLA: yes     24hr Temp:  [98 °F (36.7 °C)-98.7 °F (37.1 °C)]     CV:   Rhythm: sinus tachycardia  BP goals:   SBP < 140  MAP > 65    Resp:      Oxygen Concentration (%): 24    Plan: N/A    GI/:     Diet/Nutrition Received: regular  Last Bowel Movement: 01/30/23  Voiding Characteristics: external catheter    Intake/Output Summary (Last 24 hours) at 1/31/2023 0353  Last data filed at 1/31/2023 0105  Gross per 24 hour   Intake 828.06 ml   Output 910 ml   Net -81.94 ml     Unmeasured Output  Urine Occurrence: 1  Stool Occurrence: 1  Pad Count: 2    Labs/Accuchecks:  Recent Labs   Lab 01/31/23  0209   WBC 2.81*   RBC 2.61*   HGB 7.3*   HCT 24.6*   PLT 76*      Recent Labs   Lab 01/31/23  0209   *   K 4.3   CO2 29      BUN 16   CREATININE 0.4*   ALKPHOS 108   ALT 32   AST 54*   BILITOT 1.1*      Recent Labs   Lab 01/25/23  0108   INR 1.4*   APTT 25.0    No results for input(s): CPK, CPKMB, TROPONINI, MB in the last 168 hours.    Electrolytes: N/A - electrolytes WDL  Accuchecks: none    Gtts:   hydromorphone in 0.9 % NaCl 6 mg/30 ml         LDA/Wounds:  Lines/Drains/Airways       Peripherally Inserted Central Catheter Line  Duration              PICC Double Lumen 01/26/23 1209 left basilic 4 days              Drain  Duration                  Closed/Suction Drain 01/24/23 Posterior Back Accordion 10 Fr. 7 days         Closed/Suction Drain 01/24/23 Posterior;Right Back Accordion 10 Fr. 7 days    Female External Urinary Catheter 01/27/23 1115 3 days              Peripheral Intravenous Line  Duration                  Midline Catheter Insertion/Assessment  - Single Lumen 01/12/23 2138 Right basilic vein (medial side of arm) 18g x 10cm 18 days                  Wounds: No; posterior surgical site   Wound care consulted: No

## 2023-01-31 NOTE — PROGRESS NOTES
[facility]  Neurosurgery  Progress Note    Subjective:     History of Present Illness: Ms. Zazueta is a 42 y.o F w/ hx ofIV drug use (methamphetamine), chronic HCV with cirrhosis, spinal fusion (04/2021), epidural abscess with removal of hardware (02/2022), spinal fusion with hardware (T10 - Pelvis / 03/2022), obesity (BMI 39.3) and neurogenic bladder and recent shoulder surgery who initially presented to [facility] for evaluation of back pain and left leg weakness.  She reports initially noting the left leg weakness when she was about to go to the bathroom and was about to fall but was assisted by her boyfriend.  She was brought to the ED via EMS.  Urinalysis with UTI concerns and blood cultures at OSH grew ESBL E coli, and shoulder effusion concern for infection so she was treated with meropenem.  During hospitalization, she reports the weakness that started out in her left leg initially then spread upwards to her left thigh, left hip, then crossed over to the right hip and spread to her right leg.  Denies recent IV drug use. She reports her last incidence of drug use was more than 3 years ago and also denies tobacco, and alcohol abuse.  Reports cannabis use.     OSH course notable for concerning urinalysis and blood cultures positive for ESBL E coli treated with meropenem.  She was also admit to the ICU where she was treated with Precedex for significant body aches, irritability, and muscle spasms.  Concerns of a murmur on physical exam so she underwent TTE which did not show any vegetations.  Worsening lower extremity weakness workup with MRI head nonspecific, MRI cervical spine with multilevel spondylosis, X-ray spine with multilevel thoracolumbar fusion and diskectomy, focal kyphosis at T9-10 increased compared to prior.  She was started on prophylaxis amoxicillin due to her history of infected hardware.  MRI spine unable to be completed due to patient's body habitus so she was  transferred to [facility] for further imaging and Neurosurgery, Neurology evaluation.      Post-Op Info:  Procedure(s) (LRB):  **AIRO** T8-T12 POSTERIOR FUSION, T8-T10 LAMINECTOMY, HARDWARE REMOVAL AND WASHOUT (N/A)   7 Days Post-Op     Interval History:   1/31: MATEO CABRALES, neuro stable.     Medications:  Continuous Infusions:   hydromorphone in 0.9 % NaCl 6 mg/30 ml       Scheduled Meds:   acetaminophen  1,000 mg Oral TID    buPROPion  300 mg Oral Daily    gabapentin  600 mg Oral TID    lactulose  20 g Oral TID    meropenem (MERREM) IVPB  2 g Intravenous Q8H    methocarbamoL  1,000 mg Oral QID    pantoprazole  40 mg Oral Daily    polyethylene glycol  17 g Oral BID    senna-docusate 8.6-50 mg  2 tablet Oral BID    sodium chloride 0.9%  10 mL Intravenous Q6H     PRN Meds:dextrose 10%, dextrose 10%, diazePAM, glucagon (human recombinant), glucose, glucose, hydrALAZINE, labetalol, magnesium hydroxide 400 mg/5 ml, magnesium oxide, magnesium oxide, melatonin, naloxone, ondansetron, oxyCODONE, polyethylene glycol, potassium bicarbonate, potassium bicarbonate, potassium bicarbonate, potassium, sodium phosphates, potassium, sodium phosphates, potassium, sodium phosphates, prochlorperazine, sodium chloride 0.9%, Flushing PICC Protocol **AND** sodium chloride 0.9% **AND** sodium chloride 0.9%     Review of Systems  Objective:     Weight: 129.5 kg (285 lb 7.9 oz)  Body mass index is 44.71 kg/m².  Vital Signs (Most Recent):  Temp: 98.5 °F (36.9 °C) (01/31/23 0305)  Pulse: 109 (01/31/23 0605)  Resp: 20 (01/31/23 0605)  BP: (!) 114/57 (01/31/23 0605)  SpO2: 100 % (01/31/23 0605) Vital Signs (24h Range):  Temp:  [98 °F (36.7 °C)-98.7 °F (37.1 °C)] 98.5 °F (36.9 °C)  Pulse:  [102-125] 109  Resp:  [12-36] 20  SpO2:  [95 %-100 %] 100 %  BP: ()/(51-68) 114/57                              Neurosurgery Physical Exam  General: well developed, well nourished, no distress.   Head: normocephalic, atraumatic  Neurologic: Alert  and oriented. Thought content appropriate.  GCS: Motor: 6/Verbal: 5/Eyes: 4 GCS Total: 15  Mental Status: Awake, Alert, Oriented x 4  Language: No aphasia  Speech: No dysarthria  Cranial nerves: face symmetric, tongue midline, CN II-XII grossly intact.   Eyes: pupils equal, round, reactive to light with accommodation, EOMI, nystagmus.   Pulmonary: normal respirations, no signs of respiratory distress  Abdomen: soft, non-distended, not tender to palpation  Skin: Skin is warm, dry and intact.  Sensory: intact to light touch throughout     Motor Strength:Moves all extremities spontaneously with good tone.  Full strength upper and extremities. No abnormal movements seen.      Strength   Deltoids Triceps Biceps Wrist Extension Wrist Flexion Hand    Upper: R 5/5 5/5 5/5 5/5 5/5 5/5     L 5/5 5/5 5/5 5/5 5/5 5/5       Iliopsoas Quadriceps Knee  Flexion Tibialis  anterior Gastro- cnemius EHL   Lower: R 3/5 4+/5 4+/5 4/5 4-/5 4/5     L 3/5 4+/5 4+/5 4/5 4-/5 4/5      Reflexes:   DTR: 2+ symmetrically throughout.  Parker's: Negative.  Babinski's: Present bilaterally  Clonus: 2 beats bilateral lower extremity     Incision well healed       Significant Labs:  Recent Labs   Lab 01/30/23  0243 01/31/23  0209   GLU 99 100   * 135*   K 4.2 4.3    104   CO2 26 29   BUN 17 16   CREATININE 0.5 0.4*   CALCIUM 7.8* 8.0*   MG 1.9 1.8       Recent Labs   Lab 01/29/23  0830 01/30/23  0243 01/31/23  0209   WBC 3.58* 2.90* 2.81*   HGB 7.5* 7.3* 7.3*   HCT 24.3* 24.4* 24.6*   PLT 91* 80* 76*       No results for input(s): LABPT, INR, APTT in the last 48 hours.    Microbiology Results (last 7 days)       Procedure Component Value Units Date/Time    Blood culture [[id number]] Collected: 01/27/23 0120    Order Status: Completed Specimen: Blood from Peripheral, Forearm, Right Updated: 01/31/23 0612     Blood Culture, Routine No Growth to date      No Growth to date      No Growth to date      No Growth to date      No Growth to  date    Blood culture [[id number]] Collected: 01/27/23 0123    Order Status: Completed Specimen: Blood from Peripheral, Antecubital, Left Updated: 01/31/23 0612     Blood Culture, Routine No Growth to date      No Growth to date      No Growth to date      No Growth to date      No Growth to date    Culture, Anaerobe [[id number]] Collected: 01/24/23 0943    Order Status: Completed Specimen: Bone from Back Updated: 01/30/23 0943     Anaerobic Culture No anaerobes isolated    Narrative:      4) Perispinal    Culture, Anaerobe [[id number]] Collected: 01/24/23 0943    Order Status: Completed Specimen: Bone from Back Updated: 01/30/23 0942     Anaerobic Culture No anaerobes isolated    Narrative:      3) Perispinal    Culture, Anaerobe [[id number]] Collected: 01/24/23 0913    Order Status: Completed Specimen: Body Fluid from Back Updated: 01/30/23 0942     Anaerobic Culture No anaerobes isolated    Narrative:      2) Perispinal    Culture, Anaerobe [[id number]] Collected: 01/24/23 0913    Order Status: Completed Specimen: Body Fluid from Back Updated: 01/30/23 0859     Anaerobic Culture No anaerobes isolated    Narrative:      1) Perispinal    Aerobic culture [[id number]]  (Abnormal)  (Susceptibility) Collected: 01/24/23 0943    Order Status: Completed Specimen: Bone from Back Updated: 01/28/23 1145     Aerobic Bacterial Culture ESCHERICHIA COLI ESBL  From broth only      Narrative:      4) Perispinal    Aerobic culture [[id number]]  (Abnormal)  (Susceptibility) Collected: 01/24/23 0914    Order Status: Completed Specimen: Wound from Back Updated: 01/26/23 1306     Aerobic Bacterial Culture ESCHERICHIA COLI  Rare      Narrative:      1) Perispinal    Aerobic culture [[id number]]  (Abnormal)  (Susceptibility) Collected: 01/24/23 0914    Order Status: Completed Specimen: Wound from Back Updated: 01/26/23 1306     Aerobic Bacterial Culture ESCHERICHIA COLI ESBL  Few      Narrative:      2) Perispinal    Aerobic culture [[id number]]   (Abnormal)  (Susceptibility) Collected: 01/24/23 0943    Order Status: Completed Specimen: Bone from Back Updated: 01/26/23 1252     Aerobic Bacterial Culture ESCHERICHIA COLI ESBL  Few      Narrative:      3) Perispinal    AFB Culture & Smear [[id number]] Collected: 01/24/23 0943    Order Status: Completed Specimen: Bone from Back Updated: 01/25/23 2127     AFB Culture & Smear Culture in progress     AFB CULTURE STAIN No acid fast bacilli seen.    Narrative:      4) Perispinal    AFB Culture & Smear [[id number]] Collected: 01/24/23 0943    Order Status: Completed Specimen: Bone from Back Updated: 01/25/23 2127     AFB Culture & Smear Culture in progress     AFB CULTURE STAIN No acid fast bacilli seen.    Narrative:      3) Perispinal    AFB Culture & Smear [[id number]] Collected: 01/24/23 0913    Order Status: Completed Specimen: Body Fluid from Back Updated: 01/25/23 2127     AFB Culture & Smear Culture in progress     AFB CULTURE STAIN No acid fast bacilli seen.    Narrative:      1) Perispinal    AFB Culture & Smear [[id number]] Collected: 01/24/23 0913    Order Status: Completed Specimen: Body Fluid from Back Updated: 01/25/23 2127     AFB Culture & Smear Culture in progress     AFB CULTURE STAIN No acid fast bacilli seen.    Narrative:      2) Perispinal    Blood culture [[id number]] Collected: 01/20/23 1131    Order Status: Completed Specimen: Blood Updated: 01/25/23 1412     Blood Culture, Routine No growth after 5 days.    Narrative:      Collection has been rescheduled by [facility] at 01/20/2023 11:03 Reason:   Patient unavailable in room with doctors   Collection has been rescheduled by [facility] at 01/20/2023 11:03 Reason:   Patient unavailable in room with doctors     Blood culture [[id number]] Collected: 01/20/23 1131    Order Status: Completed Specimen: Blood Updated: 01/25/23 1412     Blood Culture, Routine No growth after 5 days.    Narrative:      Collection has been rescheduled by [facility] at 01/20/2023 11:03 Reason:    Patient unavailable in room with doctors   Collection has been rescheduled by [facility] at 01/20/2023 11:03 Reason:   Patient unavailable in room with doctors     Gram stain [[id number]] Collected: 01/24/23 0913    Order Status: Completed Specimen: Body Fluid from Back Updated: 01/24/23 1239     Gram Stain Result No WBC's      No organisms seen    Narrative:      1) Perispinal    Gram stain [[id number]] Collected: 01/24/23 0944    Order Status: Completed Specimen: Bone from Back Updated: 01/24/23 1238     Gram Stain Result Rare WBC's      No organisms seen    Narrative:      3) Perispinal    Gram stain [[id number]] Collected: 01/24/23 0913    Order Status: Completed Specimen: Body Fluid from Back Updated: 01/24/23 1237     Gram Stain Result Rare WBC's      No organisms seen    Narrative:      2) Perispinal    Gram stain [[id number]] Collected: 01/24/23 0943    Order Status: Completed Specimen: Bone from Back Updated: 01/24/23 1236     Gram Stain Result No WBC's      No organisms seen    Narrative:      4) Perispinal    Fungus culture [[id number]] Collected: 01/24/23 0943    Order Status: Sent Specimen: Bone from Back Updated: 01/24/23 1009    Fungus culture [[id number]] Collected: 01/24/23 0943    Order Status: Sent Specimen: Bone from Back Updated: 01/24/23 1007    Fungus culture [[id number]] Collected: 01/24/23 0913    Order Status: Sent Specimen: Body Fluid from Back Updated: 01/24/23 0929    Fungus culture [[id number]] Collected: 01/24/23 0913    Order Status: Sent Specimen: Body Fluid from Back Updated: 01/24/23 0927          All pertinent labs from the last 24 hours have been reviewed.    Significant Diagnostics:  CT C-spine:  Vertebrae: The dens, lateral masses, and occipital condyles are intact.  There is no evidence of fracture or dislocation.     Discs: Multilevel disc height loss and degenerative endplate change.  Prominent C6 inferior endplate Schmorl's node, similar in appearance dating back to MRI from  02/14/2022.     Degenerative changes: Sent spinal canal dimensions at C3-C4 are 10.5 mm, 8.5 mm at C4-C5, 10.0 mm at C5-C6, and 10.8 mm at C6-C7 .  Multilevel uncovertebral spurring with multilevel neural foraminal narrowing most pronounced at C5-C6 with moderate right neural foraminal narrowing and C6-C7 with moderate left neural foraminal narrowing.    Physical Exam  Neurosurgery Physical Exam    Assessment/Plan:     * Weakness of both lower extremities  Pt is 42F multiple spinal surgeries and revisions last T10- Pelvis in March 2022 who presented to OSH with concern for shoulder infection and UTI found to have E coli sepsis who has had progressive worsening BLE weakness, XR showed possible worsening proximal junctional kyphotic deformity. Transferred to C to  and neurosurgery was consulted    OR yesterday for temporary T6-12 PSIF. Taz pus noted intraoperatively.   Given intraoperative findings, new operative plan for T4-pelvis revision and extension of fusion with T9-10 corpectomy with plastic surgery assistance planned for 2/2/23    Plan:  Admitted to ICU, Keep in ICU on logroll precautions until stage 2 Thursday.    -- MRI C/T/L spine 1/20 with C7 SP enhancement, focal kyphosis at T8-10 with severe anterior height loss at T9, enhancing C6 inferior endplate Schmorl's node  -- CT T/L spine thin cut 1/20 with haloing of pelvic and T10 screws, spondylodiscitis T8-9, T9-10  -- CT C spine with concern for discitis at C6-7  -- flex ext XR done, limited view of C6-7 in swimmers view 2/2 shoulders  -- 1/20 ESR 70, CRP 9.4. elevated from prior   -- TLSO at bedside  --HV x2 to gravity, W[place] in place, monitor ouput  --ID: Vanc/merropenam   -- BCx 1/20: NGTD   -- OR cultures 1/24: E. coli  --OR 2/2/23 as above   -- will obtain informed consent   -- NPO 2/1/23 midnight  -- multimodal pain control per primary medicine team  NSGY will continue to follow. Please contact team with any further questions.        Mabel Chang,  MD  Neurosurgery  [facility]

## 2023-01-31 NOTE — PLAN OF CARE
Recommendations     1. Continue current regular diet.      2. Discontinue Boost Plus TID 2/2 PO intake of meals %.  - If PO intake consistently <50%, re-add Boost Plus BID (pt wants vanilla ONLY).      3. RD following.     Goals: Continue meeting % EEN/EPN by next RD f/u.  Nutrition Goal Status: new  Communication of RD Recs:  (POC)    Ella Higginbotham, Registration Eligible, Provisional LDN

## 2023-02-01 ENCOUNTER — ANESTHESIA EVENT (OUTPATIENT)
Dept: SURGERY | Facility: HOSPITAL | Age: 43
DRG: 453 | End: 2023-02-01
Payer: MEDICAID

## 2023-02-01 LAB
ABO + RH BLD: NORMAL
ALBUMIN SERPL BCP-MCNC: 1.7 G/DL (ref 3.5–5.2)
ALP SERPL-CCNC: 107 U/L (ref 55–135)
ALT SERPL W/O P-5'-P-CCNC: 29 U/L (ref 10–44)
ANION GAP SERPL CALC-SCNC: 3 MMOL/L (ref 8–16)
AST SERPL-CCNC: 50 U/L (ref 10–40)
BACTERIA BLD CULT: NORMAL
BACTERIA BLD CULT: NORMAL
BASOPHILS # BLD AUTO: 0.01 K/UL (ref 0–0.2)
BASOPHILS # BLD AUTO: 0.02 K/UL (ref 0–0.2)
BASOPHILS NFR BLD: 0.4 % (ref 0–1.9)
BASOPHILS NFR BLD: 0.8 % (ref 0–1.9)
BILIRUB SERPL-MCNC: 1.1 MG/DL (ref 0.1–1)
BLD GP AB SCN CELLS X3 SERPL QL: NORMAL
BLD PROD TYP BPU: NORMAL
BLOOD UNIT EXPIRATION DATE: NORMAL
BLOOD UNIT TYPE CODE: 600
BLOOD UNIT TYPE CODE: 600
BLOOD UNIT TYPE CODE: 6200
BLOOD UNIT TYPE: NORMAL
BUN SERPL-MCNC: 18 MG/DL (ref 6–20)
CALCIUM SERPL-MCNC: 7.6 MG/DL (ref 8.7–10.5)
CHLORIDE SERPL-SCNC: 104 MMOL/L (ref 95–110)
CO2 SERPL-SCNC: 29 MMOL/L (ref 23–29)
CODING SYSTEM: NORMAL
CREAT SERPL-MCNC: 0.5 MG/DL (ref 0.5–1.4)
DIFFERENTIAL METHOD: ABNORMAL
DIFFERENTIAL METHOD: ABNORMAL
DISPENSE STATUS: NORMAL
EOSINOPHIL # BLD AUTO: 0.1 K/UL (ref 0–0.5)
EOSINOPHIL # BLD AUTO: 0.1 K/UL (ref 0–0.5)
EOSINOPHIL NFR BLD: 4.3 % (ref 0–8)
EOSINOPHIL NFR BLD: 4.5 % (ref 0–8)
ERYTHROCYTE [DISTWIDTH] IN BLOOD BY AUTOMATED COUNT: 19.2 % (ref 11.5–14.5)
ERYTHROCYTE [DISTWIDTH] IN BLOOD BY AUTOMATED COUNT: 19.6 % (ref 11.5–14.5)
EST. GFR  (NO RACE VARIABLE): >60 ML/MIN/1.73 M^2
GLUCOSE SERPL-MCNC: 100 MG/DL (ref 70–110)
HCT VFR BLD AUTO: 22.4 % (ref 37–48.5)
HCT VFR BLD AUTO: 23.5 % (ref 37–48.5)
HGB BLD-MCNC: 6.9 G/DL (ref 12–16)
HGB BLD-MCNC: 7.1 G/DL (ref 12–16)
IMM GRANULOCYTES # BLD AUTO: 0 K/UL (ref 0–0.04)
IMM GRANULOCYTES # BLD AUTO: 0.01 K/UL (ref 0–0.04)
IMM GRANULOCYTES NFR BLD AUTO: 0 % (ref 0–0.5)
IMM GRANULOCYTES NFR BLD AUTO: 0.4 % (ref 0–0.5)
INR PPP: 1.2 (ref 0.8–1.2)
LYMPHOCYTES # BLD AUTO: 0.4 K/UL (ref 1–4.8)
LYMPHOCYTES # BLD AUTO: 0.5 K/UL (ref 1–4.8)
LYMPHOCYTES NFR BLD: 16.7 % (ref 18–48)
LYMPHOCYTES NFR BLD: 18.8 % (ref 18–48)
MAGNESIUM SERPL-MCNC: 1.8 MG/DL (ref 1.6–2.6)
MCH RBC QN AUTO: 28.4 PG (ref 27–31)
MCH RBC QN AUTO: 29 PG (ref 27–31)
MCHC RBC AUTO-ENTMCNC: 30.2 G/DL (ref 32–36)
MCHC RBC AUTO-ENTMCNC: 30.8 G/DL (ref 32–36)
MCV RBC AUTO: 94 FL (ref 82–98)
MCV RBC AUTO: 94 FL (ref 82–98)
MONOCYTES # BLD AUTO: 0.3 K/UL (ref 0.3–1)
MONOCYTES # BLD AUTO: 0.3 K/UL (ref 0.3–1)
MONOCYTES NFR BLD: 11.4 % (ref 4–15)
MONOCYTES NFR BLD: 12.9 % (ref 4–15)
NEUTROPHILS # BLD AUTO: 1.5 K/UL (ref 1.8–7.7)
NEUTROPHILS # BLD AUTO: 1.6 K/UL (ref 1.8–7.7)
NEUTROPHILS NFR BLD: 64.1 % (ref 38–73)
NEUTROPHILS NFR BLD: 65.7 % (ref 38–73)
NRBC BLD-RTO: 0 /100 WBC
NRBC BLD-RTO: 0 /100 WBC
PHOSPHATE SERPL-MCNC: 2.9 MG/DL (ref 2.7–4.5)
PLATELET # BLD AUTO: 67 K/UL (ref 150–450)
PLATELET # BLD AUTO: 82 K/UL (ref 150–450)
PMV BLD AUTO: 10.7 FL (ref 9.2–12.9)
PMV BLD AUTO: 9 FL (ref 9.2–12.9)
POTASSIUM SERPL-SCNC: 4.4 MMOL/L (ref 3.5–5.1)
PROT SERPL-MCNC: 4.8 G/DL (ref 6–8.4)
PROTHROMBIN TIME: 12.7 SEC (ref 9–12.5)
RBC # BLD AUTO: 2.38 M/UL (ref 4–5.4)
RBC # BLD AUTO: 2.5 M/UL (ref 4–5.4)
SODIUM SERPL-SCNC: 136 MMOL/L (ref 136–145)
TRANS ERYTHROCYTES VOL PATIENT: NORMAL ML
UNIT NUMBER: NORMAL
WBC # BLD AUTO: 2.33 K/UL (ref 3.9–12.7)
WBC # BLD AUTO: 2.45 K/UL (ref 3.9–12.7)

## 2023-02-01 PROCEDURE — 27000207 HC ISOLATION

## 2023-02-01 PROCEDURE — 85025 COMPLETE CBC W/AUTO DIFF WBC: CPT | Mod: 91 | Performed by: NURSE PRACTITIONER

## 2023-02-01 PROCEDURE — 86900 BLOOD TYPING SEROLOGIC ABO: CPT | Performed by: PSYCHIATRY & NEUROLOGY

## 2023-02-01 PROCEDURE — 99233 PR SUBSEQUENT HOSPITAL CARE,LEVL III: ICD-10-PCS | Mod: ,,, | Performed by: INTERNAL MEDICINE

## 2023-02-01 PROCEDURE — 99900035 HC TECH TIME PER 15 MIN (STAT)

## 2023-02-01 PROCEDURE — 86920 COMPATIBILITY TEST SPIN: CPT | Performed by: NURSE PRACTITIONER

## 2023-02-01 PROCEDURE — 36430 TRANSFUSION BLD/BLD COMPNT: CPT

## 2023-02-01 PROCEDURE — 94761 N-INVAS EAR/PLS OXIMETRY MLT: CPT

## 2023-02-01 PROCEDURE — 99497 ADVNCD CARE PLAN 30 MIN: CPT | Mod: ,,, | Performed by: PSYCHIATRY & NEUROLOGY

## 2023-02-01 PROCEDURE — 25000003 PHARM REV CODE 250: Performed by: PSYCHIATRY & NEUROLOGY

## 2023-02-01 PROCEDURE — 63600175 PHARM REV CODE 636 W HCPCS

## 2023-02-01 PROCEDURE — 86920 COMPATIBILITY TEST SPIN: CPT | Performed by: PSYCHIATRY & NEUROLOGY

## 2023-02-01 PROCEDURE — P9021 RED BLOOD CELLS UNIT: HCPCS | Performed by: NURSE PRACTITIONER

## 2023-02-01 PROCEDURE — 80053 COMPREHEN METABOLIC PANEL: CPT | Performed by: STUDENT IN AN ORGANIZED HEALTH CARE EDUCATION/TRAINING PROGRAM

## 2023-02-01 PROCEDURE — 25000003 PHARM REV CODE 250: Performed by: INTERNAL MEDICINE

## 2023-02-01 PROCEDURE — 99233 SBSQ HOSP IP/OBS HIGH 50: CPT | Mod: ,,, | Performed by: INTERNAL MEDICINE

## 2023-02-01 PROCEDURE — 25000003 PHARM REV CODE 250

## 2023-02-01 PROCEDURE — A4216 STERILE WATER/SALINE, 10 ML: HCPCS | Performed by: PSYCHIATRY & NEUROLOGY

## 2023-02-01 PROCEDURE — 20000000 HC ICU ROOM

## 2023-02-01 PROCEDURE — 99233 SBSQ HOSP IP/OBS HIGH 50: CPT | Mod: ,,, | Performed by: PSYCHIATRY & NEUROLOGY

## 2023-02-01 PROCEDURE — P9035 PLATELET PHERES LEUKOREDUCED: HCPCS | Performed by: STUDENT IN AN ORGANIZED HEALTH CARE EDUCATION/TRAINING PROGRAM

## 2023-02-01 PROCEDURE — 85610 PROTHROMBIN TIME: CPT | Performed by: STUDENT IN AN ORGANIZED HEALTH CARE EDUCATION/TRAINING PROGRAM

## 2023-02-01 PROCEDURE — 86920 COMPATIBILITY TEST SPIN: CPT | Performed by: NEUROLOGICAL SURGERY

## 2023-02-01 PROCEDURE — 99233 PR SUBSEQUENT HOSPITAL CARE,LEVL III: ICD-10-PCS | Mod: ,,, | Performed by: PSYCHIATRY & NEUROLOGY

## 2023-02-01 PROCEDURE — P9035 PLATELET PHERES LEUKOREDUCED: HCPCS | Performed by: PSYCHIATRY & NEUROLOGY

## 2023-02-01 PROCEDURE — 63600175 PHARM REV CODE 636 W HCPCS: Performed by: STUDENT IN AN ORGANIZED HEALTH CARE EDUCATION/TRAINING PROGRAM

## 2023-02-01 PROCEDURE — 27000221 HC OXYGEN, UP TO 24 HOURS

## 2023-02-01 PROCEDURE — 25000003 PHARM REV CODE 250: Performed by: STUDENT IN AN ORGANIZED HEALTH CARE EDUCATION/TRAINING PROGRAM

## 2023-02-01 PROCEDURE — 99497 PR ADVNCD CARE PLAN 30 MIN: ICD-10-PCS | Mod: ,,, | Performed by: PSYCHIATRY & NEUROLOGY

## 2023-02-01 PROCEDURE — 83735 ASSAY OF MAGNESIUM: CPT | Performed by: STUDENT IN AN ORGANIZED HEALTH CARE EDUCATION/TRAINING PROGRAM

## 2023-02-01 PROCEDURE — 85025 COMPLETE CBC W/AUTO DIFF WBC: CPT

## 2023-02-01 PROCEDURE — 84100 ASSAY OF PHOSPHORUS: CPT | Performed by: STUDENT IN AN ORGANIZED HEALTH CARE EDUCATION/TRAINING PROGRAM

## 2023-02-01 RX ORDER — HYDROCODONE BITARTRATE AND ACETAMINOPHEN 500; 5 MG/1; MG/1
TABLET ORAL
Status: DISCONTINUED | OUTPATIENT
Start: 2023-02-01 | End: 2023-02-06

## 2023-02-01 RX ADMIN — PANTOPRAZOLE SODIUM 40 MG: 40 TABLET, DELAYED RELEASE ORAL at 08:02

## 2023-02-01 RX ADMIN — OXYCODONE HYDROCHLORIDE 10 MG: 10 TABLET ORAL at 06:02

## 2023-02-01 RX ADMIN — Medication 10 ML: at 06:02

## 2023-02-01 RX ADMIN — GABAPENTIN 600 MG: 300 CAPSULE ORAL at 02:02

## 2023-02-01 RX ADMIN — Medication 10 ML: at 12:02

## 2023-02-01 RX ADMIN — MEROPENEM 2 G: 1 INJECTION INTRAVENOUS at 03:02

## 2023-02-01 RX ADMIN — Medication 10 ML: at 01:02

## 2023-02-01 RX ADMIN — DIAZEPAM 5 MG: 5 TABLET ORAL at 08:02

## 2023-02-01 RX ADMIN — OXYCODONE HYDROCHLORIDE 10 MG: 10 TABLET ORAL at 12:02

## 2023-02-01 RX ADMIN — Medication: at 03:02

## 2023-02-01 RX ADMIN — GABAPENTIN 600 MG: 300 CAPSULE ORAL at 08:02

## 2023-02-01 RX ADMIN — ACETAMINOPHEN 1000 MG: 500 TABLET ORAL at 08:02

## 2023-02-01 RX ADMIN — Medication: at 08:02

## 2023-02-01 RX ADMIN — ACETAMINOPHEN 1000 MG: 500 TABLET ORAL at 02:02

## 2023-02-01 RX ADMIN — OXYCODONE HYDROCHLORIDE 10 MG: 10 TABLET ORAL at 07:02

## 2023-02-01 RX ADMIN — OXYCODONE HYDROCHLORIDE 10 MG: 10 TABLET ORAL at 09:02

## 2023-02-01 RX ADMIN — METHOCARBAMOL 1000 MG: 500 TABLET ORAL at 02:02

## 2023-02-01 RX ADMIN — METHOCARBAMOL 1000 MG: 500 TABLET ORAL at 08:02

## 2023-02-01 RX ADMIN — ONDANSETRON 4 MG: 2 INJECTION INTRAMUSCULAR; INTRAVENOUS at 03:02

## 2023-02-01 RX ADMIN — MEROPENEM 2 G: 1 INJECTION INTRAVENOUS at 08:02

## 2023-02-01 RX ADMIN — BUPROPION HYDROCHLORIDE 300 MG: 300 TABLET, FILM COATED, EXTENDED RELEASE ORAL at 08:02

## 2023-02-01 NOTE — PROGRESS NOTES
[facility]  Neurosurgery  Progress Note    Subjective:     History of Present Illness: Ms. Zazueta is a 42 y.o F w/ hx ofIV drug use (methamphetamine), chronic HCV with cirrhosis, spinal fusion (04/2021), epidural abscess with removal of hardware (02/2022), spinal fusion with hardware (T10 - Pelvis / 03/2022), obesity (BMI 39.3) and neurogenic bladder and recent shoulder surgery who initially presented to [facility] for evaluation of back pain and left leg weakness.  She reports initially noting the left leg weakness when she was about to go to the bathroom and was about to fall but was assisted by her boyfriend.  She was brought to the ED via EMS.  Urinalysis with UTI concerns and blood cultures at OSH grew ESBL E coli, and shoulder effusion concern for infection so she was treated with meropenem.  During hospitalization, she reports the weakness that started out in her left leg initially then spread upwards to her left thigh, left hip, then crossed over to the right hip and spread to her right leg.  Denies recent IV drug use. She reports her last incidence of drug use was more than 3 years ago and also denies tobacco, and alcohol abuse.  Reports cannabis use.     OSH course notable for concerning urinalysis and blood cultures positive for ESBL E coli treated with meropenem.  She was also admit to the ICU where she was treated with Precedex for significant body aches, irritability, and muscle spasms.  Concerns of a murmur on physical exam so she underwent TTE which did not show any vegetations.  Worsening lower extremity weakness workup with MRI head nonspecific, MRI cervical spine with multilevel spondylosis, X-ray spine with multilevel thoracolumbar fusion and diskectomy, focal kyphosis at T9-10 increased compared to prior.  She was started on prophylaxis amoxicillin due to her history of infected hardware.  MRI spine unable to be completed due to patient's body habitus so she was  transferred to [facility] for further imaging and Neurosurgery, Neurology evaluation.      Post-Op Info:  Procedure(s) (LRB):  **AIRO** T8-T12 POSTERIOR FUSION, T8-T10 LAMINECTOMY, HARDWARE REMOVAL AND WASHOUT (N/A)   8 Days Post-Op     Interval History: 2/1: NAEON, AFVSS, drain ouput 50 and 10. Hb 7.1, Plt 67 today. Needs platelet transfusion for ptl goal > 100 prior to OR    Medications:  Continuous Infusions:   hydromorphone in 0.9 % NaCl 6 mg/30 ml       Scheduled Meds:   acetaminophen  1,000 mg Oral TID    buPROPion  300 mg Oral Daily    gabapentin  600 mg Oral TID    lactulose  20 g Oral TID    meropenem (MERREM) IVPB  2 g Intravenous Q8H    methocarbamoL  1,000 mg Oral QID    pantoprazole  40 mg Oral Daily    polyethylene glycol  17 g Oral BID    senna-docusate 8.6-50 mg  2 tablet Oral BID    sodium chloride 0.9%  10 mL Intravenous Q6H     PRN Meds:sodium chloride, sodium chloride, dextrose 10%, dextrose 10%, diazePAM, glucagon (human recombinant), glucose, glucose, hydrALAZINE, labetalol, magnesium hydroxide 400 mg/5 ml, magnesium oxide, magnesium oxide, melatonin, naloxone, ondansetron, oxyCODONE, polyethylene glycol, potassium bicarbonate, potassium bicarbonate, potassium bicarbonate, potassium, sodium phosphates, potassium, sodium phosphates, potassium, sodium phosphates, prochlorperazine, sodium chloride 0.9%, Flushing PICC Protocol **AND** sodium chloride 0.9% **AND** sodium chloride 0.9%     Review of Systems  Objective:     Weight: 129.5 kg (285 lb 7.9 oz)  Body mass index is 44.71 kg/m².  Vital Signs (Most Recent):  Temp: 97.8 °F (36.6 °C) (02/01/23 0705)  Pulse: 105 (02/01/23 0905)  Resp: (!) 21 (02/01/23 0905)  BP: 106/60 (02/01/23 0905)  SpO2: 95 % (02/01/23 0905) Vital Signs (24h Range):  Temp:  [97.8 °F (36.6 °C)-98.7 °F (37.1 °C)] 97.8 °F (36.6 °C)  Pulse:  [] 105  Resp:  [12-39] 21  SpO2:  [95 %-100 %] 95 %  BP: ()/(49-69) 106/60     Date 02/01/23 0700 - 02/02/23 0659   Shift  5784-2672 4628-0079 5801-3414 24 Hour Total   INTAKE   P.O. 350   350   I.V.(mL/kg) 42(0.3)   42(0.3)   IV Piggyback 52.6   52.6   Shift Total(mL/kg) 444.6(3.4)   444.6(3.4)   OUTPUT   Urine(mL/kg/hr) 200   200   Shift Total(mL/kg) 200(1.5)   200(1.5)   Weight (kg) 129.5 129.5 129.5 129.5                            Neurosurgery Physical Exam  General: well developed, well nourished, no distress.   Head: normocephalic, atraumatic  Neurologic: Alert and oriented. Thought content appropriate.  GCS: Motor: 6/Verbal: 5/Eyes: 4 GCS Total: 15  Mental Status: Awake, Alert, Oriented x 4  Language: No aphasia  Speech: No dysarthria  Cranial nerves: face symmetric, tongue midline, CN II-XII grossly intact.   Eyes: pupils equal, round, reactive to light with accommodation, EOMI, nystagmus.   Pulmonary: normal respirations, no signs of respiratory distress  Abdomen: soft, non-distended, not tender to palpation  Skin: Skin is warm, dry and intact.  Sensory: intact to light touch throughout     Motor Strength:Moves all extremities spontaneously with good tone.  Full strength upper and extremities. No abnormal movements seen.      Strength   Deltoids Triceps Biceps Wrist Extension Wrist Flexion Hand    Upper: R 5/5 5/5 5/5 5/5 5/5 5/5     L 5/5 5/5 5/5 5/5 5/5 5/5       Iliopsoas Quadriceps Knee  Flexion Tibialis  anterior Gastro- cnemius EHL   Lower: R 3/5 4+/5 4+/5 4/5 4-/5 4/5     L 3/5 4+/5 4+/5 4/5 4-/5 4/5      Reflexes:   DTR: 2+ symmetrically throughout.  Parker's: Negative.  Babinski's: Present bilaterally  Clonus: 2 beats bilateral lower extremity     Incision well healed       Significant Labs:  Recent Labs   Lab 01/31/23  0209 02/01/23  0129    100   * 136   K 4.3 4.4    104   CO2 29 29   BUN 16 18   CREATININE 0.4* 0.5   CALCIUM 8.0* 7.6*   MG 1.8 1.8       Recent Labs   Lab 01/31/23  0209 02/01/23  0129   WBC 2.81* 2.45*   HGB 7.3* 7.1*   HCT 24.6* 23.5*   PLT 76* 67*       No results for  input(s): LABPT, INR, APTT in the last 48 hours.    Microbiology Results (last 7 days)       Procedure Component Value Units Date/Time    Blood culture [[id number]] Collected: 01/27/23 0120    Order Status: Completed Specimen: Blood from Peripheral, Forearm, Right Updated: 02/01/23 0612     Blood Culture, Routine No growth after 5 days.    Blood culture [[id number]] Collected: 01/27/23 0123    Order Status: Completed Specimen: Blood from Peripheral, Antecubital, Left Updated: 02/01/23 0612     Blood Culture, Routine No growth after 5 days.    Culture, Anaerobe [[id number]] Collected: 01/24/23 0943    Order Status: Completed Specimen: Bone from Back Updated: 01/30/23 0943     Anaerobic Culture No anaerobes isolated    Narrative:      4) Perispinal    Culture, Anaerobe [[id number]] Collected: 01/24/23 0943    Order Status: Completed Specimen: Bone from Back Updated: 01/30/23 0942     Anaerobic Culture No anaerobes isolated    Narrative:      3) Perispinal    Culture, Anaerobe [[id number]] Collected: 01/24/23 0913    Order Status: Completed Specimen: Body Fluid from Back Updated: 01/30/23 0942     Anaerobic Culture No anaerobes isolated    Narrative:      2) Perispinal    Culture, Anaerobe [[id number]] Collected: 01/24/23 0913    Order Status: Completed Specimen: Body Fluid from Back Updated: 01/30/23 0859     Anaerobic Culture No anaerobes isolated    Narrative:      1) Perispinal    Aerobic culture [[id number]]  (Abnormal)  (Susceptibility) Collected: 01/24/23 0943    Order Status: Completed Specimen: Bone from Back Updated: 01/28/23 1145     Aerobic Bacterial Culture ESCHERICHIA COLI ESBL  From broth only      Narrative:      4) Perispinal    Aerobic culture [[id number]]  (Abnormal)  (Susceptibility) Collected: 01/24/23 0914    Order Status: Completed Specimen: Wound from Back Updated: 01/26/23 1306     Aerobic Bacterial Culture ESCHERICHIA COLI  Rare      Narrative:      1) Perispinal    Aerobic culture [[id number]]   (Abnormal)  (Susceptibility) Collected: 01/24/23 0914    Order Status: Completed Specimen: Wound from Back Updated: 01/26/23 1306     Aerobic Bacterial Culture ESCHERICHIA COLI ESBL  Few      Narrative:      2) Perispinal    Aerobic culture [[id number]]  (Abnormal)  (Susceptibility) Collected: 01/24/23 0943    Order Status: Completed Specimen: Bone from Back Updated: 01/26/23 1252     Aerobic Bacterial Culture ESCHERICHIA COLI ESBL  Few      Narrative:      3) Perispinal    AFB Culture & Smear [[id number]] Collected: 01/24/23 0943    Order Status: Completed Specimen: Bone from Back Updated: 01/25/23 2127     AFB Culture & Smear Culture in progress     AFB CULTURE STAIN No acid fast bacilli seen.    Narrative:      4) Perispinal    AFB Culture & Smear [[id number]] Collected: 01/24/23 0943    Order Status: Completed Specimen: Bone from Back Updated: 01/25/23 2127     AFB Culture & Smear Culture in progress     AFB CULTURE STAIN No acid fast bacilli seen.    Narrative:      3) Perispinal    AFB Culture & Smear [[id number]] Collected: 01/24/23 0913    Order Status: Completed Specimen: Body Fluid from Back Updated: 01/25/23 2127     AFB Culture & Smear Culture in progress     AFB CULTURE STAIN No acid fast bacilli seen.    Narrative:      1) Perispinal    AFB Culture & Smear [[id number]] Collected: 01/24/23 0913    Order Status: Completed Specimen: Body Fluid from Back Updated: 01/25/23 2127     AFB Culture & Smear Culture in progress     AFB CULTURE STAIN No acid fast bacilli seen.    Narrative:      2) Perispinal    Blood culture [[id number]] Collected: 01/20/23 1131    Order Status: Completed Specimen: Blood Updated: 01/25/23 1412     Blood Culture, Routine No growth after 5 days.    Narrative:      Collection has been rescheduled by [facility] at 01/20/2023 11:03 Reason:   Patient unavailable in room with doctors   Collection has been rescheduled by 2 at 01/20/2023 11:03 Reason:   Patient unavailable in room with doctors      Blood culture [[id number]] Collected: 01/20/23 1131    Order Status: Completed Specimen: Blood Updated: 01/25/23 1412     Blood Culture, Routine No growth after 5 days.    Narrative:      Collection has been rescheduled by [facility] at 01/20/2023 11:03 Reason:   Patient unavailable in room with doctors   Collection has been rescheduled by [facility] at 01/20/2023 11:03 Reason:   Patient unavailable in room with doctors           All pertinent labs from the last 24 hours have been reviewed.    Significant Diagnostics:  CT C-spine:  Vertebrae: The dens, lateral masses, and occipital condyles are intact.  There is no evidence of fracture or dislocation.     Discs: Multilevel disc height loss and degenerative endplate change.  Prominent C6 inferior endplate Schmorl's node, similar in appearance dating back to MRI from 02/14/2022.     Degenerative changes: Sent spinal canal dimensions at C3-C4 are 10.5 mm, 8.5 mm at C4-C5, 10.0 mm at C5-C6, and 10.8 mm at C6-C7 .  Multilevel uncovertebral spurring with multilevel neural foraminal narrowing most pronounced at C5-C6 with moderate right neural foraminal narrowing and C6-C7 with moderate left neural foraminal narrowing.    Physical Exam  Neurosurgery Physical Exam    Assessment/Plan:     * Weakness of both lower extremities  Pt is 42F multiple spinal surgeries and revisions last T10- Pelvis in March 2022 who presented to OSH with concern for shoulder infection and UTI found to have E coli sepsis who has had progressive worsening BLE weakness, XR showed possible worsening proximal junctional kyphotic deformity. Transferred to [facility] to  and neurosurgery was consulted    OR yesterday for temporary T6-12 PSIF. Taz pus noted intraoperatively.   Given intraoperative findings, new operative plan for T4-pelvis revision and extension of fusion with T9-10 corpectomy with plastic surgery assistance planned for 2/2/23    Plan:  Admitted to ICU, Keep in ICU on logroll precautions until stage 2  Thursday.    -- MRI C/T/L spine 1/20 with C7 SP enhancement, focal kyphosis at T8-10 with severe anterior height loss at T9, enhancing C6 inferior endplate Schmorl's node  -- CT T/L spine thin cut 1/20 with haloing of pelvic and T10 screws, spondylodiscitis T8-9, T9-10  -- CT C spine with concern for discitis at C6-7  -- flex ext XR done, limited view of C6-7 in swimmers view 2/2 shoulders  -- 1/20 ESR 70, CRP 9.4. elevated from prior   -- TLSO at bedside  --HV x2 R to gravity, L FS  --ID: merropenam   -- BCx 1/20: NGTD   -- OR cultures 1/24: E. coli  --OR 2/2/23 as above   -- will obtain informed consent   -- NPO 2/1/23 midnight   -- Hb 7.1, Plt 67 today. Needs platelet transfusion for ptl goal > 100 prior to OR  -- multimodal pain control per primary medicine team  NSGY will continue to follow. Please contact team with any further questions.        Mabel Chang MD  Neurosurgery  [facility]

## 2023-02-01 NOTE — SUBJECTIVE & OBJECTIVE
Interval History: reports pain is well controlled, no new symptoms, denies bleeding, fevers, rigors.     Review of Systems   Constitutional:  Positive for activity change and fatigue. Negative for appetite change and fever.   HENT:  Negative for trouble swallowing.    Respiratory:  Negative for shortness of breath.    Cardiovascular:  Negative for chest pain.   Gastrointestinal:  Negative for abdominal pain.   Musculoskeletal:  Positive for arthralgias and back pain.   Skin:  Positive for wound.   Neurological:  Negative for speech difficulty.   Psychiatric/Behavioral:  Negative for behavioral problems and confusion.        Objective:     Vital Signs (Most Recent):  Temp: 98.5 °F (36.9 °C) (02/01/23 1752)  Pulse: 107 (02/01/23 1752)  Resp: (!) 22 (02/01/23 1804)  BP: 119/60 (02/01/23 1752)  SpO2: 95 % (02/01/23 1752)   Vital Signs (24h Range):  Temp:  [97.8 °F (36.6 °C)-98.7 °F (37.1 °C)] 98.5 °F (36.9 °C)  Pulse:  [] 107  Resp:  [12-39] 22  SpO2:  [94 %-100 %] 95 %  BP: ()/(47-69) 119/60     Weight: 129.5 kg (285 lb 7.9 oz)  Body mass index is 44.71 kg/m².    Estimated Creatinine Clearance: 205.5 mL/min (based on SCr of 0.5 mg/dL).    Physical Exam  Constitutional:       Appearance: She is obese. She is not ill-appearing or toxic-appearing.   HENT:      Head: Normocephalic and atraumatic.      Nose: Nose normal.      Mouth/Throat:      Mouth: Mucous membranes are moist.      Pharynx: Oropharynx is clear.   Eyes:      Extraocular Movements: Extraocular movements intact.      Conjunctiva/sclera: Conjunctivae normal.      Pupils: Pupils are equal, round, and reactive to light.   Cardiovascular:      Rate and Rhythm: Normal rate and regular rhythm.      Pulses: Normal pulses.      Heart sounds: Normal heart sounds.   Pulmonary:      Effort: Pulmonary effort is normal.      Breath sounds: Normal breath sounds.   Abdominal:      General: Bowel sounds are normal.      Palpations: Abdomen is soft.    Musculoskeletal:         General: No swelling, tenderness or deformity.      Cervical back: Normal range of motion and neck supple.      Comments: Surgical drains x 2 present with serosanguinous drainage   Skin:     General: Skin is warm and dry.      Comments: LUE picc line c/d/I  RUE midline c/d/i   Neurological:      Mental Status: She is oriented to person, place, and time. Mental status is at baseline.       Significant Labs: BMP:   Recent Labs   Lab 02/01/23 0129         K 4.4      CO2 29   BUN 18   CREATININE 0.5   CALCIUM 7.6*   MG 1.8     CBC:   Recent Labs   Lab 01/31/23 0209 02/01/23 0129 02/01/23  1446   WBC 2.81* 2.45* 2.33*   HGB 7.3* 7.1* 6.9*   HCT 24.6* 23.5* 22.4*   PLT 76* 67* 82*     CMP:   Recent Labs   Lab 01/31/23 0209 02/01/23 0129   * 136   K 4.3 4.4    104   CO2 29 29    100   BUN 16 18   CREATININE 0.4* 0.5   CALCIUM 8.0* 7.6*   PROT 4.9* 4.8*   ALBUMIN 1.7* 1.7*   BILITOT 1.1* 1.1*   ALKPHOS 108 107   AST 54* 50*   ALT 32 29   ANIONGAP 2* 3*     Microbiology Results (last 7 days)       Procedure Component Value Units Date/Time    Blood culture [[id number]] Collected: 01/27/23 0120    Order Status: Completed Specimen: Blood from Peripheral, Forearm, Right Updated: 02/01/23 0612     Blood Culture, Routine No growth after 5 days.    Blood culture [[id number]] Collected: 01/27/23 0123    Order Status: Completed Specimen: Blood from Peripheral, Antecubital, Left Updated: 02/01/23 0612     Blood Culture, Routine No growth after 5 days.    Culture, Anaerobe [[id number]] Collected: 01/24/23 0943    Order Status: Completed Specimen: Bone from Back Updated: 01/30/23 0943     Anaerobic Culture No anaerobes isolated    Narrative:      4) Perispinal    Culture, Anaerobe [[id number]] Collected: 01/24/23 0943    Order Status: Completed Specimen: Bone from Back Updated: 01/30/23 0942     Anaerobic Culture No anaerobes isolated    Narrative:      3) Perispinal     Culture, Anaerobe [[id number]] Collected: 01/24/23 0913    Order Status: Completed Specimen: Body Fluid from Back Updated: 01/30/23 0942     Anaerobic Culture No anaerobes isolated    Narrative:      2) Perispinal    Culture, Anaerobe [[id number]] Collected: 01/24/23 0913    Order Status: Completed Specimen: Body Fluid from Back Updated: 01/30/23 0859     Anaerobic Culture No anaerobes isolated    Narrative:      1) Perispinal    Aerobic culture [[id number]]  (Abnormal)  (Susceptibility) Collected: 01/24/23 0943    Order Status: Completed Specimen: Bone from Back Updated: 01/28/23 1145     Aerobic Bacterial Culture ESCHERICHIA COLI ESBL  From broth only      Narrative:      4) Perispinal    Aerobic culture [[id number]]  (Abnormal)  (Susceptibility) Collected: 01/24/23 0914    Order Status: Completed Specimen: Wound from Back Updated: 01/26/23 1306     Aerobic Bacterial Culture ESCHERICHIA COLI  Rare      Narrative:      1) Perispinal    Aerobic culture [[id number]]  (Abnormal)  (Susceptibility) Collected: 01/24/23 0914    Order Status: Completed Specimen: Wound from Back Updated: 01/26/23 1306     Aerobic Bacterial Culture ESCHERICHIA COLI ESBL  Few      Narrative:      2) Perispinal    Aerobic culture [[id number]]  (Abnormal)  (Susceptibility) Collected: 01/24/23 0943    Order Status: Completed Specimen: Bone from Back Updated: 01/26/23 1252     Aerobic Bacterial Culture ESCHERICHIA COLI ESBL  Few      Narrative:      3) Perispinal    AFB Culture & Smear [[id number]] Collected: 01/24/23 0943    Order Status: Completed Specimen: Bone from Back Updated: 01/25/23 2127     AFB Culture & Smear Culture in progress     AFB CULTURE STAIN No acid fast bacilli seen.    Narrative:      4) Perispinal    AFB Culture & Smear [[id number]] Collected: 01/24/23 0943    Order Status: Completed Specimen: Bone from Back Updated: 01/25/23 2127     AFB Culture & Smear Culture in progress     AFB CULTURE STAIN No acid fast bacilli seen.     Narrative:      3) Perispinal    AFB Culture & Smear [[id number]] Collected: 01/24/23 0913    Order Status: Completed Specimen: Body Fluid from Back Updated: 01/25/23 2127     AFB Culture & Smear Culture in progress     AFB CULTURE STAIN No acid fast bacilli seen.    Narrative:      1) Perispinal    AFB Culture & Smear [[id number]] Collected: 01/24/23 0913    Order Status: Completed Specimen: Body Fluid from Back Updated: 01/25/23 2127     AFB Culture & Smear Culture in progress     AFB CULTURE STAIN No acid fast bacilli seen.    Narrative:      2) Perispinal          Recent Lab Results         02/01/23  1446   02/01/23  1113   02/01/23  0925   02/01/23  0129        Unit Blood Type Code     6200              6200  [P]              6200  [P]              0600  [P]         Unit Expiration     [id number]  [P]              [id number]  [P]              [id number]  [P]         Unit Blood Type     A POS              A POS  [P]              A POS  [P]              A NEG  [P]         Albumin       1.7       Alkaline Phosphatase       107       ALT       29       Anion Gap       3       AST       50       Baso # 0.01       0.02       Basophil % 0.4       0.8       BILIRUBIN TOTAL       1.1  Comment: For infants and newborns, interpretation of results should be based  on gestational age, weight and in agreement with clinical  observations.    Premature Infant recommended reference ranges:  Up to 24 hours.............<8.0 mg/dL  Up to 48 hours............<12.0 mg/dL  3-5 days..................<15.0 mg/dL  6-29 days.................<15.0 mg/dL         BUN       18       Calcium       7.6       Chloride       104       CO2       29       CODING SYSTEM     [id number]              [id number]  [P]              [id number]  [P]              [id number]  [P]         Creatinine       0.5       Differential Method Automated       Automated       DISPENSE STATUS     RETURNED              ISSUED  [P]               ISSUED  [P]              ISSUED  [P]         eGFR       >60.0       Eos # 0.1       0.1       Eosinophil % 4.3       4.5       Glucose       100       Gran # (ANC) 1.5       1.6       Gran % 65.7       64.1       Group & Rh     A POS         Hematocrit 22.4       23.5       Hemoglobin 6.9       7.1       Immature Grans (Abs) 0.00  Comment: Mild elevation in immature granulocytes is non specific and   can be seen in a variety of conditions including stress response,   acute inflammation, trauma and pregnancy. Correlation with other   laboratory and clinical findings is essential.         0.01  Comment: Mild elevation in immature granulocytes is non specific and   can be seen in a variety of conditions including stress response,   acute inflammation, trauma and pregnancy. Correlation with other   laboratory and clinical findings is essential.         Immature Granulocytes 0.0       0.4       INDIRECT ESSENCE     NEG         INR   1.2  Comment: Coumadin Therapy:  2.0 - 3.0 for INR for all indicators except mechanical heart valves  and antiphospholipid syndromes which should use 2.5 - 3.5.             Lymph # 0.4       0.5       Lymph % 16.7       18.8       Magnesium       1.8       MCH 29.0       28.4       MCHC 30.8       30.2       MCV 94       94       Mono # 0.3       0.3       Mono % 12.9       11.4       MPV 10.7       9.0       nRBC 0       0       Phosphorus       2.9       Platelets 82       67       Potassium       4.4       Product Code     [id number]              [id number]  [P]              [id number]  [P]              [id number]  [P]         PROTEIN TOTAL       4.8       Protime   12.7           RBC 2.38       2.50       RDW 19.2       19.6       Sodium       136       UNIT NUMBER     [id number]              [id number]  [P]              [id number]  [P]              [id number]  [P]         WBC 2.33       2.45                [P] - Preliminary Result               Significant Imaging: I have reviewed all  pertinent imaging results/findings within the past 24 hours.

## 2023-02-01 NOTE — SUBJECTIVE & OBJECTIVE
Interval History:  See hospital course above.     Review of Systems   Constitutional:  Negative for fever.   HENT:  Negative for congestion and rhinorrhea.    Eyes:  Negative for visual disturbance.   Respiratory:  Negative for shortness of breath.    Cardiovascular:  Negative for chest pain.   Gastrointestinal:  Negative for abdominal distention, abdominal pain, nausea and vomiting.   Genitourinary:  Negative for dysuria and hematuria.   Musculoskeletal:  Positive for back pain. Negative for neck pain.   Neurological:  Positive for weakness. Negative for dizziness, seizures, light-headedness, numbness and headaches.     Objective:     Vitals:  Temp: 98.1 °F (36.7 °C)  Pulse: (!) 115  Rhythm: sinus tachycardia  BP: (!) 119/55  MAP (mmHg): 78  Resp: (!) 22  ETCO2 (mmHg): 39 mmHg  SpO2: 97 %    Temp  Min: 97.8 °F (36.6 °C)  Max: 98.7 °F (37.1 °C)  Pulse  Min: 97  Max: 115  BP  Min: 96/49  Max: 120/63  MAP (mmHg)  Min: 70  Max: 85  Resp  Min: 12  Max: 39  ETCO2 (mmHg)  Min: 39 mmHg  Max: 39 mmHg  SpO2  Min: 95 %  Max: 100 %    01/31 0701 - 02/01 0700  In: 759.5 [P.O.:400; I.V.:62]  Out: 930 [Urine:800; Drains:130]   Unmeasured Output  Urine Occurrence: 1  Stool Occurrence: 1  Pad Count: 1       Physical Exam  Vitals and nursing note reviewed.   Constitutional:       General: She is not in acute distress.     Appearance: She is obese. She is not ill-appearing, toxic-appearing or diaphoretic.   HENT:      Head: Normocephalic.      Mouth/Throat:      Mouth: Mucous membranes are moist.   Eyes:      General: No scleral icterus.        Right eye: No discharge.         Left eye: No discharge.   Cardiovascular:      Rate and Rhythm: Normal rate and regular rhythm.   Pulmonary:      Effort: Pulmonary effort is normal. No respiratory distress.      Comments: NC in place  Abdominal:      General: Abdomen is flat.      Palpations: Abdomen is soft.      Tenderness: There is no abdominal tenderness.   Musculoskeletal:          General: No deformity.      Cervical back: No rigidity.   Skin:     General: Skin is warm and dry.      Coloration: Skin is not jaundiced.   Neurological:      Mental Status: She is alert and oriented to person, place, and time. Mental status is at baseline.      Motor: Weakness (Bilateral LE) and tremor (Bilateral UE) present.      Comments:   E4 V5 M6  Awake, alert, and oriented to self, time, place, and situation. Speech fluent. Answering all questions appropriately and following all commands briskly.   PERRL. Extraocular movements grossly intact. No facial asymmetry. Conversational hearing intact.   FOWLER spontaneously and follows commands. Strength 4/5 in BUE & 3/5 in BLE. Sensation intact to light touch in all 4 extremities.    Psychiatric:         Mood and Affect: Mood normal.         Behavior: Behavior normal.     Gait & coordination exams deferred.      Medications:  Continuoushydromorphone in 0.9 % NaCl 6 mg/30 ml  Scheduledacetaminophen, 1,000 mg, TID  buPROPion, 300 mg, Daily  gabapentin, 600 mg, TID  lactulose, 20 g, TID  meropenem (MERREM) IVPB, 2 g, Q8H  methocarbamoL, 1,000 mg, QID  pantoprazole, 40 mg, Daily  polyethylene glycol, 17 g, BID  senna-docusate 8.6-50 mg, 2 tablet, BID  sodium chloride 0.9%, 10 mL, Q6H  PRNsodium chloride, , Q24H PRN  sodium chloride, , Q24H PRN  dextrose 10%, 12.5 g, PRN  dextrose 10%, 25 g, PRN  diazePAM, 5 mg, Q6H PRN  glucagon (human recombinant), 1 mg, PRN  glucose, 16 g, PRN  glucose, 24 g, PRN  hydrALAZINE, 10 mg, Q6H PRN  labetalol, 10 mg, Q4H PRN  magnesium hydroxide 400 mg/5 ml, 30 mL, Daily PRN  magnesium oxide, 800 mg, PRN  magnesium oxide, 800 mg, PRN  melatonin, 6 mg, Nightly PRN  naloxone, 0.02 mg, PRN  ondansetron, 4 mg, Q6H PRN  oxyCODONE, 10 mg, Q4H PRN  polyethylene glycol, 17 g, Daily PRN  potassium bicarbonate, 35 mEq, PRN  potassium bicarbonate, 50 mEq, PRN  potassium bicarbonate, 60 mEq, PRN  potassium, sodium phosphates, 2 packet, PRN  potassium,  sodium phosphates, 2 packet, PRN  potassium, sodium phosphates, 2 packet, PRN  prochlorperazine, 2.5 mg, Q6H PRN  sodium chloride 0.9%, 10 mL, Q12H PRN  sodium chloride 0.9%, 10 mL, PRN    Today I personally reviewed pertinent medications, lines/drains/airways, laboratory results, microbiology results, notably:      Laboratory:  CBC:  Recent Labs   Lab 02/01/23 0129   WBC 2.45*   RBC 2.50*   HGB 7.1*   HCT 23.5*   PLT 67*   MCV 94   MCH 28.4   MCHC 30.2*         CMP:  Recent Labs   Lab 02/01/23 0129   CALCIUM 7.6*   PROT 4.8*      K 4.4   CO2 29      BUN 18   CREATININE 0.5   ALKPHOS 107   ALT 29   AST 50*   BILITOT 1.1*       Microbiology:  Microbiology Results (last 7 days)       Procedure Component Value Units Date/Time    Blood culture [[id number]] Collected: 01/27/23 0120    Order Status: Completed Specimen: Blood from Peripheral, Forearm, Right Updated: 02/01/23 0612     Blood Culture, Routine No growth after 5 days.    Blood culture [[id number]] Collected: 01/27/23 0123    Order Status: Completed Specimen: Blood from Peripheral, Antecubital, Left Updated: 02/01/23 0612     Blood Culture, Routine No growth after 5 days.    Culture, Anaerobe [[id number]] Collected: 01/24/23 0943    Order Status: Completed Specimen: Bone from Back Updated: 01/30/23 0943     Anaerobic Culture No anaerobes isolated    Narrative:      4) Perispinal    Culture, Anaerobe [[id number]] Collected: 01/24/23 0943    Order Status: Completed Specimen: Bone from Back Updated: 01/30/23 0942     Anaerobic Culture No anaerobes isolated    Narrative:      3) Perispinal    Culture, Anaerobe [[id number]] Collected: 01/24/23 0913    Order Status: Completed Specimen: Body Fluid from Back Updated: 01/30/23 0942     Anaerobic Culture No anaerobes isolated    Narrative:      2) Perispinal    Culture, Anaerobe [[id number]] Collected: 01/24/23 0913    Order Status: Completed Specimen: Body Fluid from Back Updated: 01/30/23 0859     Anaerobic  Culture No anaerobes isolated    Narrative:      1) Perispinal    Aerobic culture [[id number]]  (Abnormal)  (Susceptibility) Collected: 01/24/23 0943    Order Status: Completed Specimen: Bone from Back Updated: 01/28/23 1145     Aerobic Bacterial Culture ESCHERICHIA COLI ESBL  From broth only      Narrative:      4) Perispinal    Aerobic culture [[id number]]  (Abnormal)  (Susceptibility) Collected: 01/24/23 0914    Order Status: Completed Specimen: Wound from Back Updated: 01/26/23 1306     Aerobic Bacterial Culture ESCHERICHIA COLI  Rare      Narrative:      1) Perispinal    Aerobic culture [[id number]]  (Abnormal)  (Susceptibility) Collected: 01/24/23 0914    Order Status: Completed Specimen: Wound from Back Updated: 01/26/23 1306     Aerobic Bacterial Culture ESCHERICHIA COLI ESBL  Few      Narrative:      2) Perispinal    Aerobic culture [[id number]]  (Abnormal)  (Susceptibility) Collected: 01/24/23 0943    Order Status: Completed Specimen: Bone from Back Updated: 01/26/23 1252     Aerobic Bacterial Culture ESCHERICHIA COLI ESBL  Few      Narrative:      3) Perispinal    AFB Culture & Smear [[id number]] Collected: 01/24/23 0943    Order Status: Completed Specimen: Bone from Back Updated: 01/25/23 2127     AFB Culture & Smear Culture in progress     AFB CULTURE STAIN No acid fast bacilli seen.    Narrative:      4) Perispinal    AFB Culture & Smear [[id number]] Collected: 01/24/23 0943    Order Status: Completed Specimen: Bone from Back Updated: 01/25/23 2127     AFB Culture & Smear Culture in progress     AFB CULTURE STAIN No acid fast bacilli seen.    Narrative:      3) Perispinal    AFB Culture & Smear [[id number]] Collected: 01/24/23 0913    Order Status: Completed Specimen: Body Fluid from Back Updated: 01/25/23 2127     AFB Culture & Smear Culture in progress     AFB CULTURE STAIN No acid fast bacilli seen.    Narrative:      1) Perispinal    AFB Culture & Smear [[id number]] Collected: 01/24/23 0913    Order  Status: Completed Specimen: Body Fluid from Back Updated: 01/25/23 2127     AFB Culture & Smear Culture in progress     AFB CULTURE STAIN No acid fast bacilli seen.    Narrative:      2) Perispinal    Blood culture [[id number]] Collected: 01/20/23 1131    Order Status: Completed Specimen: Blood Updated: 01/25/23 1412     Blood Culture, Routine No growth after 5 days.    Narrative:      Collection has been rescheduled by [facility] at 01/20/2023 11:03 Reason:   Patient unavailable in room with doctors   Collection has been rescheduled by 2 at 01/20/2023 11:03 Reason:   Patient unavailable in room with doctors     Blood culture [[id number]] Collected: 01/20/23 1131    Order Status: Completed Specimen: Blood Updated: 01/25/23 1412     Blood Culture, Routine No growth after 5 days.    Narrative:      Collection has been rescheduled by [facility] at 01/20/2023 11:03 Reason:   Patient unavailable in room with doctors   Collection has been rescheduled by [facility] at 01/20/2023 11:03 Reason:   Patient unavailable in room with doctors               Diet  Diet Adult Regular (IDDSI Level 7)  Diet NPO Except for: Medication, Sips with Medication  Diet Adult Regular (IDDSI Level 7)  Diet NPO Except for: Medication, Sips with Medication

## 2023-02-01 NOTE — ANESTHESIA PREPROCEDURE EVALUATION
[facility]  Anesthesia Pre-Operative Evaluation         Patient Name: Rachel Zazueta  YOB: 1980  MRN: [id number]    SUBJECTIVE:     Pre-operative evaluation for Procedure(s) (LRB):  LAMINECTOMY, SPINE, THORACIC, WITH FUSION (T4-Pelvis fusion w/ T9-10 corpectomies) **AIRO (N/A)     02/01/2023    Rachel Zazueta is a 42 y.o. female w/ a significant PMHx of IVDU, Hep C Cirrhosis, Anxiety/Depression, Lumbar and thoracic discitis, Pulmonary HTN. s/p spinal fusion (04/2021), lumbar epidural abscess (+ GBS) with removal of hardware (02/2022), and T10-pelvis spinal fusion with hardware (03/2022) admitted with weakness in bilateral extremities. S/p T6-12 PSIF with keke pus noted intraoperatively. Now planning for T4-pelvis revision and extension of fusion with T9-10 corpectomy with plastic surgery assistance planned for 2/2/23    Paper consent signed in chart. Ipad does not pull up econsent.        TTE 1/20/23   Study is negative for intercardiac shunt that is right to left ( see text).   The left ventricle is normal in size with concentric remodeling and normal systolic function.   The estimated ejection fraction is 58%.   There are segmental left ventricular wall motion abnormalities.   Normal left ventricular diastolic function.   Normal right ventricular size with normal right ventricular systolic function.   Mild right atrial enlargement.   Normal central venous pressure (3 mmHg).   The estimated PA systolic pressure is 26 mmHg.      LDA:   Closed/Suction Drain Posterior Back  Closed/Suction Drain R Back  PICC L basilic  Female External Urinary Catheter  Midline R Basilic    Prev airway:   Intubation     Date/Time: 1/24/2023 7:31 AM  Performed by: Radha Pope CRNA  Authorized by: Osmar Oh MD      Intubation:     Induction:  Intravenous    Intubated:  Postinduction    Mask Ventilation:  Easy mask    Attempts:  1    Attempted By:  Student    Method of  Intubation:  Video laryngoscopy    Blade:  Su 3    Laryngeal View Grade: Grade I - full view of cords      Difficult Airway Encountered?: No      Complications:  None    Airway Device:  Oral endotracheal tube    Airway Device Size:  7.0    Style/Cuff Inflation:  Cuffed (inflated to minimal occlusive pressure)    Tube secured:  21    Secured at:  The lips    Placement Verified By:  Capnometry    Complicating Factors:  None    Findings Post-Intubation:  BS equal bilateral and atraumatic/condition of teeth unchanged    Drips: None documented.    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    Spondylodiscitis    Spinal stenosis at L4-L5 level    Thrombocytopenia    Tobacco use disorder    Amphetamine use disorder, severe    Cannabis use disorder, moderate, dependence    Chronic midline low back pain with sciatica    Mild episode of recurrent major depressive disorder    Abnormal LFTs    Coagulopathy    Discitis of lumbar region    Edema    S/P spinal fusion    Pyogenic arthritis of left shoulder region    Murmur, cardiac    Severe sepsis    Pulmonary hypertension    Debility    Anxiety and depression    Weakness of both lower extremities    Thoracic discitis    Kyphosis of thoracolumbar region       Review of patient's allergies indicates:   Allergen Reactions    Bee venom protein (honey bee) Anaphylaxis     Patient reports she is allergic to bee stings.    Naproxen Anaphylaxis     Throat closing    12-23- Patient reports taking Ibuprofen 200 mg at home without problems. Verified X3. [place]    Wasp sting [allergen ext-venom-honey bee] Anaphylaxis    Adhesive Blisters    Iodine and iodide containing products Hives     Allergic to iodine in seafood only    Shellfish containing products     Nuts [tree nut] Rash       Current Outpatient  Medications:  No current facility-administered medications for this encounter.  No current outpatient medications on file.    Facility-Administered Medications Ordered in Other Encounters:     0.9%  NaCl infusion (for blood administration), , Intravenous, Q24H PRN, Yahaira Garcia NP    0.9%  NaCl infusion (for blood administration), , Intravenous, Q24H PRN, Yahaira Garcia NP    acetaminophen tablet 1,000 mg, 1,000 mg, Oral, TID, Mihai Canada MD, 1,000 mg at 02/01/23 1447    buPROPion 24 hr tablet 300 mg, 300 mg, Oral, Daily, Hal Barger Jr., MD, 300 mg at 02/01/23 0828    dextrose 10% bolus 125 mL 125 mL, 12.5 g, Intravenous, PRN, Marvin Issa Miguel, DO    dextrose 10% bolus 250 mL 250 mL, 25 g, Intravenous, PRN, Marvin Issa Miguel, DO    diazePAM tablet 5 mg, 5 mg, Oral, Q6H PRN, Mihai Canada MD, 5 mg at 02/01/23 0828    gabapentin capsule 600 mg, 600 mg, Oral, TID, Sagar Damascus, DO, 600 mg at 02/01/23 1447    glucagon (human recombinant) injection 1 mg, 1 mg, Intramuscular, PRN, Marvin Issa Miguel, DO    glucose chewable tablet 16 g, 16 g, Oral, PRN, Marvin Issa Miguel, DO    glucose chewable tablet 24 g, 24 g, Oral, PRN, Marvin Issa Miguel, DO    hydrALAZINE injection 10 mg, 10 mg, Intravenous, Q6H PRN, Sagar Damascus, DO    HYDROmorphone PCA syringe 6 mg/30 mL (0.2 mg/mL) NS, , Intravenous, Continuous, Mihai Canada MD, New Syringe/Bag at 02/01/23 0333    labetalol 20 mg/4 mL (5 mg/mL) IV syring, 10 mg, Intravenous, Q4H PRN, Mary Archer MD    lactulose 20 gram/30 mL solution Soln 20 g, 20 g, Oral, TID, Hal Barger Jr., MD, 20 g at 01/29/23 2020    magnesium hydroxide 400 mg/5 ml suspension 2,400 mg, 30 mL, Oral, Daily PRN, Hal Barger Jr., MD    magnesium oxide tablet 800 mg, 800 mg, Oral, PRN, Andreea Geronimo PA-C, 800 mg at 01/29/23 0858    magnesium oxide tablet 800 mg, 800 mg, Oral, PRN, Andreea Geronimo PA-C    melatonin tablet 6 mg, 6 mg, Oral, Nightly  PRN, Hal Barger Jr., MD, 6 mg at 01/30/23 0251    meropenem (MERREM) 2 g in sodium chloride 0.9% 100 mL IVPB, 2 g, Intravenous, Q8H, Sagar Lamas DO, Last Rate: 100 mL/hr at 02/01/23 1502, 2 g at 02/01/23 1502    methocarbamoL tablet 1,000 mg, 1,000 mg, Oral, QID, Mihai Canada MD, 1,000 mg at 02/01/23 1447    naloxone 0.4 mg/mL injection 0.02 mg, 0.02 mg, Intravenous, PRN, Marvin Rivera DO    ondansetron injection 4 mg, 4 mg, Intravenous, Q6H PRN, Andreea Geronimo PA-C, 4 mg at 02/01/23 0311    oxyCODONE immediate release tablet Tab 10 mg, 10 mg, Oral, Q4H PRN, Mihai Canada MD, 10 mg at 02/01/23 1215    pantoprazole EC tablet 40 mg, 40 mg, Oral, Daily, Hal Barger Jr., MD, 40 mg at 02/01/23 0828    polyethylene glycol packet 17 g, 17 g, Oral, Daily PRN, Oliver Méndez MD, 17 g at 01/23/23 1256    polyethylene glycol packet 17 g, 17 g, Oral, BID, Vaishnavi Adames PA-C, 17 g at 01/29/23 0859    potassium bicarbonate disintegrating tablet 35 mEq, 35 mEq, Oral, PRN, Andreea Geronimo PA-C    potassium bicarbonate disintegrating tablet 50 mEq, 50 mEq, Oral, PRN, Andreea Geronimo PA-C    potassium bicarbonate disintegrating tablet 60 mEq, 60 mEq, Oral, PRN, Andreea Geronimo PA-C    potassium, sodium phosphates 280-160-250 mg packet 2 packet, 2 packet, Oral, PRN, Andreea Geronimo PA-C, 2 packet at 01/28/23 1719    potassium, sodium phosphates 280-160-250 mg packet 2 packet, 2 packet, Oral, PRN, Andreea Geronimo PA-C, 2 packet at 01/27/23 1056    potassium, sodium phosphates 280-160-250 mg packet 2 packet, 2 packet, Oral, PRN, Andreea Geronimo PA-C    prochlorperazine injection Soln 2.5 mg, 2.5 mg, Intravenous, Q6H PRN, Vaishnavi Adames PA-C, 2.5 mg at 01/29/23 1050    senna-docusate 8.6-50 mg per tablet 2 tablet, 2 tablet, Oral, BID, Vaishnavi Adames PA-C, 2 tablet at 01/29/23 2019    sodium chloride 0.9% flush 10 mL, 10 mL, Intravenous, Q12H PRN, Marvin Rivera DO     Flushing PICC Protocol, , , Until Discontinued **AND** sodium chloride 0.9% flush 10 mL, 10 mL, Intravenous, Q6H, 10 mL at 02/01/23 1300 **AND** sodium chloride 0.9% flush 10 mL, 10 mL, Intravenous, PRN, Eber Montoya DO    Past Surgical History:   Procedure Laterality Date    ARTHROTOMY OF SHOULDER Left 11/15/2022    Procedure: ARTHROTOMY, SHOULDER;  Surgeon: Bjorn Hayes MD;  Location: [facility];  Service: Orthopedics;  Laterality: Left;  Left acromioclavicular joint arthrotomy    BACK SURGERY      benine tumor removal      forehead, 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9    CHOLECYSTECTOMY      KIDNEY SURGERY      LUMBAR FUSION Bilateral 3/2/2022    Procedure: FUSION, SPINE, LUMBAR;  Surgeon: Guille Templeton MD;  [facility];  Service: Neurosurgery;  Laterality: Bilateral;  AIRO  T10--Pelvis    LUMBAR FUSION N/A 1/24/2023    Procedure: **AIRO** T8-T12 POSTERIOR FUSION, T8-T10 LAMINECTOMY, HARDWARE REMOVAL AND WASHOUT;  Surgeon: Guille Templeton MD;  [facility];  Service: Neurosurgery;  Laterality: N/A;    REMOVAL OF HARDWARE FROM SPINE N/A 2/21/2022    Procedure: REMOVAL, HARDWARE, SPINE;  Surgeon: Guille Templeton MD;  [facility];  Service: Neurosurgery;  Laterality: N/A;  [place]    SPINE SURGERY         Social History     Socioeconomic History    Marital status:    Tobacco Use    Smoking status: Some Days     Packs/day: 0.50     Years: 23.00     Pack years: 11.50     Types: Cigarettes    Smokeless tobacco: Never    Tobacco comments:     patient states she knows she nees to quit.   Substance and Sexual Activity    Alcohol use: Not Currently     Comment: quit 2014    Drug use: Yes     Frequency: 4.0 times per week     Types: Marijuana     Comment: Patient denies needle use, only inhalation of methampehtamines or orally.  Last known drug use was prior to admission to hospital stay for surgery    Sexual activity: Yes     Partners: Male     Birth control/protection: None     Social  Determinants of Health     Financial Resource Strain: Medium Risk    Difficulty of Paying Living Expenses: Somewhat hard   Food Insecurity: No Food Insecurity    Worried About Running Out of Food in the Last Year: Never true    Ran Out of Food in the Last Year: Never true   Transportation Needs: Unmet Transportation Needs    Lack of Transportation (Medical): Yes    Lack of Transportation (Non-Medical): Yes   Physical Activity: Inactive    Days of Exercise per Week: 0 days    Minutes of Exercise per Session: 0 min   Stress: Stress Concern Present    Feeling of Stress : Very much   Social Connections: Unknown    Frequency of Communication with Friends and Family: More than three times a week    Frequency of Social Gatherings with Friends and Family: Never    Active Member of Clubs or Organizations: No    Attends Club or Organization Meetings: Never    Marital Status:    Housing Stability: High Risk    Unable to Pay for Housing in the Last Year: No    Number of Places Lived in the Last Year: 1    Unstable Housing in the Last Year: Yes       OBJECTIVE:     Vital Signs Range (Last 24H):  Temp:  [36.6 °C (97.8 °F)-37.1 °C (98.7 °F)]   Pulse:  []   Resp:  [12-39]   BP: ()/(49-69)   SpO2:  [94 %-100 %]       Significant Labs:  Lab Results   Component Value Date    WBC 2.45 (L) 02/01/2023    HGB 7.1 (L) 02/01/2023    HCT 23.5 (L) 02/01/2023    PLT 67 (L) 02/01/2023    CHOL 68 (L) 02/07/2022    TRIG 50 02/07/2022    HDL 27 (L) 02/07/2022    ALT 29 02/01/2023    AST 50 (H) 02/01/2023     02/01/2023    K 4.4 02/01/2023     02/01/2023    CREATININE 0.5 02/01/2023    BUN 18 02/01/2023    CO2 29 02/01/2023    TSH 1.255 04/16/2021    INR 1.2 02/01/2023    HGBA1C 4.6 04/16/2021       Diagnostic Studies: No relevant studies.    EKG:   Results for orders placed or performed during the hospital encounter of 12/23/22   EKG 12-lead    Collection Time: 12/23/22  3:00 AM    Narrative    Test  Reason :     Vent. Rate : 124 BPM     Atrial Rate : 124 BPM     P-R Int : 112 ms          QRS Dur : 098 ms      QT Int : 322 ms       P-R-T Axes : 062 044 -22 degrees     QTc Int : 462 ms    Sinus tachycardia  Possible Inferior infarct (cited on or before 23-DEC-2022)  T wave abnormality, consider lateral ischemia  Abnormal ECG  When compared with ECG of 23-DEC-2022 02:46,  No significant change was found  Confirmed by MICHELL RAE MD ([id number]) on 12/23/2022 10:38:11 PM    Referred By: [email or web address]   SELF           Confirmed By:MICHELL RAE MD       2D ECHO:  TTE:  Results for orders placed or performed during the hospital encounter of 01/19/23   Echo Saline Bubble? Yes   Result Value Ref Range    BSA 2.2 m2    TDI SEPTAL 0.11 m/s    LA WIDTH 4.68 cm    TDI LATERAL 0.12 m/s    LVIDd 4.35 3.5 - 6.0 cm    IVS 0.91 0.6 - 1.1 cm    Posterior Wall 0.99 0.6 - 1.1 cm    LVIDs 3.35 2.1 - 4.0 cm    FS 23 28 - 44 %    LA volume 72.76 cm3    Sinus 3.09 cm    STJ 2.95 cm    Ascending aorta 3.11 cm    LV mass 135.24 g    LA size 3.82 cm    RVDD 3.30 cm    TAPSE 2.27 cm    Left Ventricle Relative Wall Thickness 0.46 cm    AV mean gradient 7 mmHg    AV valve area 2.85 cm2    AV Velocity Ratio 0.78     AV index (prosthetic) 0.72     Mean e' 0.12 m/s    LVOT diameter 2.24 cm    LVOT area 3.9 cm2    LVOT peak wilmer 1.23 m/s    LVOT peak VTI 24.02 cm    Ao peak wilmer 1.58 m/s    Ao VTI 33.15 cm    LVOT stroke volume 94.61 cm3    AV peak gradient 10 mmHg    TR Max Wilmer 2.39 m/s    LV Systolic Volume 45.71 mL    LV Systolic Volume Index 21.5 mL/m2    LV Diastolic Volume 85.48 mL    LV Diastolic Volume Index 40.13 mL/m2    LA Volume Index 34.2 mL/m2    LV Mass Index 63 g/m2    RA Major Axis 5.16 cm    Left Atrium Minor Axis 4.34 cm    Left Atrium Major Axis 5.34 cm    Triscuspid Valve Regurgitation Peak Gradient 23 mmHg    RA Width 3.77 cm    Right Atrial Pressure (from IVC) 3 mmHg    EF 58 %    TV rest pulmonary artery pressure 26 mmHg     Narrative    · Study is negative for intercardiac shunt that is right to left ( see   text).  · The left ventricle is normal in size with concentric remodeling and   normal systolic function.  · The estimated ejection fraction is 58%.  · There are segmental left ventricular wall motion abnormalities.  · Normal left ventricular diastolic function.  · Normal right ventricular size with normal right ventricular systolic   function.  · Mild right atrial enlargement.  · Normal central venous pressure (3 mmHg).  · The estimated PA systolic pressure is 26 mmHg.          ROSA:  No results found for this or any previous visit.    ASSESSMENT/PLAN:           Pre-op Assessment    I have reviewed the Patient Summary Reports.     I have reviewed the Nursing Notes. I have reviewed the NPO Status.   I have reviewed the Medications.     Review of Systems  Anesthesia Hx:  No problems with previous Anesthesia  History of prior surgery of interest to airway management or planning: Previous anesthesia: General 3/2022 lumbar fusion (easy BMV; DL Moore#2) with general anesthesia.  Denies Family Hx of Anesthesia complications.   Denies Personal Hx of Anesthesia complications.   Social:  Smoker IVDU   Hematology/Oncology:     Oncology Normal   Hematology Comments: pancytopenia   EENT/Dental:EENT/Dental Normal   Cardiovascular:   Exercise tolerance: good Denies Hypertension.  Denies CAD.    Denies CABG/stent.   Denies CHF. no hyperlipidemia ECG has been reviewed. EKG (2-2022)  Sinus tachycardia   Left posterior fascicular block , due to lead misplacemet   Abnormal ECG   When compared with ECG of 15-FEB-2022 14:31,   Inverted T waves have replaced nonspecific T wave abnormality in Inferior   leads    Pulmonary:   Denies COPD.  Denies Asthma.  Denies Sleep Apnea. Pulm HTN   Renal/:   Denies Chronic Renal Disease. renal calculi     Hepatic/GI:   Denies GERD. Liver Disease, Hepatitis, C    Musculoskeletal:   Hx [id number] fusion    Infection  AC joint Spine Disorders: lumbar and thoracic    Neurological:   Denies CVA.  Denies Headaches. Denies Seizures.   Chronic Pain Syndrome   Endocrine:   Denies Diabetes.  Morbid Obesity / BMI > 40Denies Obesity / BMI > 30  Psych:   Denies Psychiatric History.          Physical Exam  General: Well nourished, Cooperative, Alert and Oriented    Airway:  Mallampati: II   Mouth Opening: Normal  TM Distance: Normal  Tongue: Normal  Neck ROM: Normal ROM    Dental:  Intact, Periodontal disease        Anesthesia Plan  Type of Anesthesia, risks & benefits discussed:    Anesthesia Type: Gen ETT  Intra-op Monitoring Plan: Standard ASA Monitors, Art Line and Central Line  Post Op Pain Control Plan: multimodal analgesia and IV/PO Opioids PRN  Induction:  IV  Airway Plan: Direct, Post-Induction  Informed Consent: Informed consent signed with the Patient and all parties understand the risks and agree with anesthesia plan.  All questions answered.   ASA Score: 3  Day of Surgery Review of History & Physical: H&P Update referred to the surgeon/provider.    Ready For Surgery From Anesthesia Perspective.     .

## 2023-02-01 NOTE — PROGRESS NOTES
[facility] - Neuro Critical Care  Neurocritical Care  Progress Note    Admit Date: 1/19/2023  Service Date: 02/01/2023  Length of Stay: 13    Subjective:     Chief Complaint: Weakness of both lower extremities    History of Present Illness: 42-year-old female with a history of IV drug use, chronic hep C with cirrhosis, spinal fusion in April 2021, complicated by epidural abscess with removal of hardware February 2022 and against spinal fusion in March 2022 of T10 through the pelvis as well as neurogenic bladder who initially presented to [facility] in late December for generalized weakness, fatigue and bilateral flank pain.  Her course at [facility] was complicated by a E coli bacteremia thought to be due to a urinary source, she is completed a 7 day course of meropenem on 1/3, and had bcx from 1/2 that did not grow any bacteria.       She also had an ICU admission for ongoing back spasms and agitation requiring Precedex.  In early January patient started developing progressive lower extremity weakness greater in the left compared to the right.  She also had an MRI at the outside hospital that showed some white matter changes concerning for possible demyelinating disease.  However her thoracic spine radiographs also showed worsening of her kyphosis at T9-T10.  Due to the concern for either a demyelinating polyneuropathy, MS, or spinal cord compression the patient was transferred to [facility] for neurosurgical evaluation.       On admission the patient had a CT lumbar and thoracic spine and an MRI CTL spine. The MRI showed  focal kyphosis T8 through T10, with possible associated cord edema signal.  As well as extensive edema throughout the posterior paraspinal musculature concerning for possible infectious or inflammatory process.  There was concern that the findings at T8 through T10 could correspond with compressive myelopathy.  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  There were also erosive changes involving the sacroiliac joints bilaterally which was concerning for sacroiliitis and could be infectious.     Patient underwent laminectomy T8-T10; posterior fusion T8-T12; and washout by NSGY today. No complications noted during surgery. Patient is HDS and on 3L NC. Patient is currently on a PCA dilaudid pump.            Hospital Course: 1/25/2023 NAEO, pain well controlled on current regimen. Continue ABX. Maintain logroll precautions and HOB <30 until second stage of surgery.  01/26/2023 Hemoglobin 6.6, repeat 6.2. Increased drainage noted from bilateral hemovac. S/p 1u pRBC. PICC team consulted for long term access for antibiotics, remove IJ central line when PICC placed.   01/27/2023: NAEON. HV to gravity with decreased drainge. Hgb 7.2 this AM, will trend CBC q12h. Lytes replaced. Bowel regimen adjusted.   01/28/2023: NAEON. Received 1 unit PBRC yesterday. Hgb 7.4 this AM. Plan for OR 2/2.   01/29/2023: NAEON. HV drainage decreasing. Hgb stable. Plan for OR 2/2  01/30/2023  Stable exam. Significant solid BM's  01/31/2023  Exam stable, patient in much better spirits today  02/01/2023  Exam stable      Interval History:  See hospital course above.     Review of Systems   Constitutional:  Negative for fever.   HENT:  Negative for congestion and rhinorrhea.    Eyes:  Negative for visual disturbance.   Respiratory:  Negative for shortness of breath.    Cardiovascular:  Negative for chest pain.   Gastrointestinal:  Negative for abdominal distention, abdominal pain, nausea and vomiting.   Genitourinary:  Negative for dysuria and hematuria.   Musculoskeletal:  Positive for back pain. Negative for neck pain.   Neurological:  Positive for weakness. Negative for dizziness, seizures, light-headedness, numbness and headaches.     Objective:     Vitals:  Temp: 98.1 °F (36.7 °C)  Pulse: (!) 115  Rhythm: sinus  tachycardia  BP: (!) 119/55  MAP (mmHg): 78  Resp: (!) 22  ETCO2 (mmHg): 39 mmHg  SpO2: 97 %    Temp  Min: 97.8 °F (36.6 °C)  Max: 98.7 °F (37.1 °C)  Pulse  Min: 97  Max: 115  BP  Min: 96/49  Max: 120/63  MAP (mmHg)  Min: 70  Max: 85  Resp  Min: 12  Max: 39  ETCO2 (mmHg)  Min: 39 mmHg  Max: 39 mmHg  SpO2  Min: 95 %  Max: 100 %    01/31 0701 - 02/01 0700  In: 759.5 [P.O.:400; I.V.:62]  Out: 930 [Urine:800; Drains:130]   Unmeasured Output  Urine Occurrence: 1  Stool Occurrence: 1  Pad Count: 1       Physical Exam  Vitals and nursing note reviewed.   Constitutional:       General: She is not in acute distress.     Appearance: She is obese. She is not ill-appearing, toxic-appearing or diaphoretic.   HENT:      Head: Normocephalic.      Mouth/Throat:      Mouth: Mucous membranes are moist.   Eyes:      General: No scleral icterus.        Right eye: No discharge.         Left eye: No discharge.   Cardiovascular:      Rate and Rhythm: Normal rate and regular rhythm.   Pulmonary:      Effort: Pulmonary effort is normal. No respiratory distress.      Comments: NC in place  Abdominal:      General: Abdomen is flat.      Palpations: Abdomen is soft.      Tenderness: There is no abdominal tenderness.   Musculoskeletal:         General: No deformity.      Cervical back: No rigidity.   Skin:     General: Skin is warm and dry.      Coloration: Skin is not jaundiced.   Neurological:      Mental Status: She is alert and oriented to person, place, and time. Mental status is at baseline.      Motor: Weakness (Bilateral LE) and tremor (Bilateral UE) present.      Comments:   E4 V5 M6  Awake, alert, and oriented to self, time, place, and situation. Speech fluent. Answering all questions appropriately and following all commands briskly.   PERRL. Extraocular movements grossly intact. No facial asymmetry. Conversational hearing intact.   FOWLER spontaneously and follows commands. Strength 4/5 in BUE & 3/5 in BLE. Sensation intact to light  touch in all 4 extremities.    Psychiatric:         Mood and Affect: Mood normal.         Behavior: Behavior normal.     Gait & coordination exams deferred.      Medications:  Continuoushydromorphone in 0.9 % NaCl 6 mg/30 ml  Scheduledacetaminophen, 1,000 mg, TID  buPROPion, 300 mg, Daily  gabapentin, 600 mg, TID  lactulose, 20 g, TID  meropenem (MERREM) IVPB, 2 g, Q8H  methocarbamoL, 1,000 mg, QID  pantoprazole, 40 mg, Daily  polyethylene glycol, 17 g, BID  senna-docusate 8.6-50 mg, 2 tablet, BID  sodium chloride 0.9%, 10 mL, Q6H  PRNsodium chloride, , Q24H PRN  sodium chloride, , Q24H PRN  dextrose 10%, 12.5 g, PRN  dextrose 10%, 25 g, PRN  diazePAM, 5 mg, Q6H PRN  glucagon (human recombinant), 1 mg, PRN  glucose, 16 g, PRN  glucose, 24 g, PRN  hydrALAZINE, 10 mg, Q6H PRN  labetalol, 10 mg, Q4H PRN  magnesium hydroxide 400 mg/5 ml, 30 mL, Daily PRN  magnesium oxide, 800 mg, PRN  magnesium oxide, 800 mg, PRN  melatonin, 6 mg, Nightly PRN  naloxone, 0.02 mg, PRN  ondansetron, 4 mg, Q6H PRN  oxyCODONE, 10 mg, Q4H PRN  polyethylene glycol, 17 g, Daily PRN  potassium bicarbonate, 35 mEq, PRN  potassium bicarbonate, 50 mEq, PRN  potassium bicarbonate, 60 mEq, PRN  potassium, sodium phosphates, 2 packet, PRN  potassium, sodium phosphates, 2 packet, PRN  potassium, sodium phosphates, 2 packet, PRN  prochlorperazine, 2.5 mg, Q6H PRN  sodium chloride 0.9%, 10 mL, Q12H PRN  sodium chloride 0.9%, 10 mL, PRN    Today I personally reviewed pertinent medications, lines/drains/airways, laboratory results, microbiology results, notably:      Laboratory:  CBC:  Recent Labs   Lab 02/01/23  0129   WBC 2.45*   RBC 2.50*   HGB 7.1*   HCT 23.5*   PLT 67*   MCV 94   MCH 28.4   MCHC 30.2*         CMP:  Recent Labs   Lab 02/01/23  0129   CALCIUM 7.6*   PROT 4.8*      K 4.4   CO2 29      BUN 18   CREATININE 0.5   ALKPHOS 107   ALT 29   AST 50*   BILITOT 1.1*       Microbiology:  Microbiology Results (last 7 days)       Procedure  Component Value Units Date/Time    Blood culture [[id number]] Collected: 01/27/23 0120    Order Status: Completed Specimen: Blood from Peripheral, Forearm, Right Updated: 02/01/23 0612     Blood Culture, Routine No growth after 5 days.    Blood culture [[id number]] Collected: 01/27/23 0123    Order Status: Completed Specimen: Blood from Peripheral, Antecubital, Left Updated: 02/01/23 0612     Blood Culture, Routine No growth after 5 days.    Culture, Anaerobe [[id number]] Collected: 01/24/23 0943    Order Status: Completed Specimen: Bone from Back Updated: 01/30/23 0943     Anaerobic Culture No anaerobes isolated    Narrative:      4) Perispinal    Culture, Anaerobe [[id number]] Collected: 01/24/23 0943    Order Status: Completed Specimen: Bone from Back Updated: 01/30/23 0942     Anaerobic Culture No anaerobes isolated    Narrative:      3) Perispinal    Culture, Anaerobe [[id number]] Collected: 01/24/23 0913    Order Status: Completed Specimen: Body Fluid from Back Updated: 01/30/23 0942     Anaerobic Culture No anaerobes isolated    Narrative:      2) Perispinal    Culture, Anaerobe [[id number]] Collected: 01/24/23 0913    Order Status: Completed Specimen: Body Fluid from Back Updated: 01/30/23 0859     Anaerobic Culture No anaerobes isolated    Narrative:      1) Perispinal    Aerobic culture [[id number]]  (Abnormal)  (Susceptibility) Collected: 01/24/23 0943    Order Status: Completed Specimen: Bone from Back Updated: 01/28/23 1145     Aerobic Bacterial Culture ESCHERICHIA COLI ESBL  From broth only      Narrative:      4) Perispinal    Aerobic culture [[id number]]  (Abnormal)  (Susceptibility) Collected: 01/24/23 0914    Order Status: Completed Specimen: Wound from Back Updated: 01/26/23 1306     Aerobic Bacterial Culture ESCHERICHIA COLI  Rare      Narrative:      1) Perispinal    Aerobic culture [[id number]]  (Abnormal)  (Susceptibility) Collected: 01/24/23 0914    Order Status: Completed Specimen: Wound from Back  Updated: 01/26/23 1306     Aerobic Bacterial Culture ESCHERICHIA COLI ESBL  Few      Narrative:      2) Perispinal    Aerobic culture [[id number]]  (Abnormal)  (Susceptibility) Collected: 01/24/23 0943    Order Status: Completed Specimen: Bone from Back Updated: 01/26/23 1252     Aerobic Bacterial Culture ESCHERICHIA COLI ESBL  Few      Narrative:      3) Perispinal    AFB Culture & Smear [[id number]] Collected: 01/24/23 0943    Order Status: Completed Specimen: Bone from Back Updated: 01/25/23 2127     AFB Culture & Smear Culture in progress     AFB CULTURE STAIN No acid fast bacilli seen.    Narrative:      4) Perispinal    AFB Culture & Smear [[id number]] Collected: 01/24/23 0943    Order Status: Completed Specimen: Bone from Back Updated: 01/25/23 2127     AFB Culture & Smear Culture in progress     AFB CULTURE STAIN No acid fast bacilli seen.    Narrative:      3) Perispinal    AFB Culture & Smear [[id number]] Collected: 01/24/23 0913    Order Status: Completed Specimen: Body Fluid from Back Updated: 01/25/23 2127     AFB Culture & Smear Culture in progress     AFB CULTURE STAIN No acid fast bacilli seen.    Narrative:      1) Perispinal    AFB Culture & Smear [[id number]] Collected: 01/24/23 0913    Order Status: Completed Specimen: Body Fluid from Back Updated: 01/25/23 2127     AFB Culture & Smear Culture in progress     AFB CULTURE STAIN No acid fast bacilli seen.    Narrative:      2) Perispinal    Blood culture [[id number]] Collected: 01/20/23 1131    Order Status: Completed Specimen: Blood Updated: 01/25/23 1412     Blood Culture, Routine No growth after 5 days.    Narrative:      Collection has been rescheduled by [facility] at 01/20/2023 11:03 Reason:   Patient unavailable in room with doctors   Collection has been rescheduled by 2 at 01/20/2023 11:03 Reason:   Patient unavailable in room with doctors     Blood culture [[id number]] Collected: 01/20/23 1131    Order Status: Completed Specimen: Blood Updated:  01/25/23 1412     Blood Culture, Routine No growth after 5 days.    Narrative:      Collection has been rescheduled by Located within [facility] at 01/20/2023 11:03 Reason:   Patient unavailable in room with doctors   Collection has been rescheduled by 2 at 01/20/2023 11:03 Reason:   Patient unavailable in room with doctors               Diet  Diet Adult Regular (IDDSI Level 7)  Diet NPO Except for: Medication, Sips with Medication  Diet Adult Regular (IDDSI Level 7)  Diet NPO Except for: Medication, Sips with Medication        Assessment/Plan:     Psychiatric  Anxiety and depression  Continue Buproprion for depression  Continue Lorazepam for anxiety  Continue Gabapentin for neuropathy and anxiety    Substance use disorder  Denies IV drug use (meth) for past 3 years.  Denies alcohol and tobacco abuse.       ID  Thoracic discitis  CT T/L spine thin cut 1/20 with haloing of pelvic and T10 screws, spondylodiscitis T8-9, T9-10  CT C spine with concern for discitis at C6-7  Patient is currently afebrile with chronic pancytopenia  Previously on Amoxicillin up until surgery for chronic suppressive therapy.  ID following  Blood cx (1/29) NGTD  Intra-operative cultures with E.coli ESBL  Continue Vancomycin and meropenem  Trend CBC daily  See above    GI  Chronic hepatitis C with cirrhosis  Hx of Hep C. See Cirrhosis of Liver    Cirrhosis of liver with ascites  Patient has reportedly refused transplant evaluation in the past.   -Daily CMP and trend LFTs  -Continue Lactulose 20g TID   -Will need GI/hepatology follow up outpatient    MELD-Na score: 9 at 2/1/2023 11:13 AM  MELD score: 9 at 2/1/2023 11:13 AM  Calculated from:  Serum Creatinine: 0.5 mg/dL (Using min of 1 mg/dL) at 2/1/2023  1:29 AM  Serum Sodium: 136 mmol/L at 2/1/2023  1:29 AM  Total Bilirubin: 1.1 mg/dL at 2/1/2023  1:29 AM  INR(ratio): 1.2 at 2/1/2023 11:13 AM  Age: 42 years    Orthopedic  * Weakness of both lower extremities  41 yo F w/ history of multiple spinal surgeries  presented to OSH with possible infection of patient's shoulder and found to have UTI and E. Coli bacteremia and progressively worse B/L LE weakness. Transferred to [facility] for neurosurgical evaluation. Underwent Laminectomy T8-T10; Posterior spinal fusion T8-T12; hardware removal and washout. Admitted to [facility] for HLOC. Arrived to unit on PCA dilaudid pump. Patient is HDS on 3L NC.   - Admit to NSCCU  - NSGY, ID following  - q2hr neuro checks, vitals, I/Os  - HV drains x 2 in place, management per NSGY  -Transfuse for Hg<7  - Cultures positive for ESBL, blood cultures with NGTD. WCTM  - Antibiotics per ID recs  - CMP, Mag, Phos, CBC daily  - MAP goals > 65, SBP < 180  - PRN labetalol, hydralazine  - MM pain regimen; PCA Dilaudid pump for pain management  - Patient will need to lie flat with HOB 15-20°   - Aggressive bowel regimen  - PT/OT as appropriate   - VTE prophylaxis: mechanical, hold chemical given pancytopenia  - plan to return to the OR on 2/2          The patient is being Prophylaxed for:  Venous Thromboembolism with: Mechanical  Stress Ulcer with: PPI  Ventilator Pneumonia with: not applicable    Activity Orders          Diet NPO Except for: Medication, Sips with Medication: NPO starting at 02/02 0001    Diet Adult Regular (IDDSI Level 7): Regular starting at 01/30 0852    Turn patient starting at 01/24 1800    Up in chair With meals starting at 01/24 1700    Progressive Mobility Protocol (mobilize patient to their highest level of functioning at least twice daily) starting at 01/24 1105    Elevate HOB 30 starting at 01/24 1105    Ambulate With Assistance, Post Op Day 0 If the patient arrives from PACU by 4PM, walk at least once on day of operation if alert and safe to do so. starting at 01/24 1104        Full Code    Ace Rowley MD  Neurocritical Care  [facility] - Neuro Critical Care    Level 3 of hospital care time was spent personally by me on the following activities: development of treatment plan  with patient or surrogate and bedside caregivers, discussions with consultants, evaluation of patient's response to treatment, examination of patient, ordering and performing treatments and interventions, ordering and review of laboratory studies, ordering and review of radiographic studies, pulse oximetry, antibiotic titration if applicable, vasopressor titration if applicable, re-evaluation of patient's condition.

## 2023-02-01 NOTE — ASSESSMENT & PLAN NOTE
43 yo F w/ history of multiple spinal surgeries presented to OSH with possible infection of patient's shoulder and found to have UTI and E. Coli bacteremia and progressively worse B/L LE weakness. [place] [facility] [place]. Underwent Laminectomy T8-T10; Posterior spinal fusion T8-T12; hardware removal and washout. Admitted to [facility] for HLOC. Arrived to unit on PCA dilaudid pump. Patient is HDS on 3L NC.   - Admit to NSCCU  - NSGY, ID following  - q2hr neuro checks, vitals, I/Os  - HV drains x 2 in place, management per NSGY  -Transfuse for Hg<7  - Cultures positive for ESBL, blood cultures with NGTD. WCTM  - Antibiotics per ID recs  - CMP, Mag, Phos, CBC daily  - MAP goals > 65, SBP < 180  - PRN labetalol, hydralazine  - MM pain regimen; PCA Dilaudid pump for pain management  - Patient will need to lie flat with HOB 15-20°   - Aggressive bowel regimen  - PT/OT as appropriate   - VTE prophylaxis: mechanical, hold chemical given pancytopenia  - plan to return to the OR on 2/2

## 2023-02-01 NOTE — PLAN OF CARE
[facility] Care Plan    POC reviewed with Rachel Zazueta and family at 0300. Pt verbalized understanding. Questions and concerns addressed. No acute events overnight. Pt progressing toward goals. Will continue to monitor. See below and flowsheets for full assessment and VS info.   -pain controlled throughout night with PCA pump & oxy given x2  -zofran given x1   -3 BMs    Is this a stroke patient? no    Neuro:  [place] Coma Scale  Best Eye Response: 4-->(E4) spontaneous  Best Motor Response: 6-->(M6) obeys commands  Best Verbal Response: 5-->(V5) oriented  [place] Coma Scale Score: 15  Assessment Qualifiers: patient not sedated/intubated, no eye obstruction present  Pupil PERRLA: yes     24hr Temp:  [98.2 °F (36.8 °C)-98.7 °F (37.1 °C)]     CV:   Rhythm: sinus tachycardia  BP goals:   SBP < 140  MAP > 65    Resp:      Oxygen Concentration (%): 24    Plan: N/A    GI/:     Diet/Nutrition Received: regular  Last Bowel Movement: 01/31/23  Voiding Characteristics: external catheter    Intake/Output Summary (Last 24 hours) at 2/1/2023 0355  Last data filed at 2/1/2023 0138  Gross per 24 hour   Intake 1004.53 ml   Output 1700 ml   Net -695.47 ml     Unmeasured Output  Urine Occurrence: 1  Stool Occurrence: 1  Pad Count: 4    Labs/Accuchecks:  Recent Labs   Lab 02/01/23  0129   WBC 2.45*   RBC 2.50*   HGB 7.1*   HCT 23.5*   PLT 67*      Recent Labs   Lab 02/01/23  0129      K 4.4   CO2 29      BUN 18   CREATININE 0.5   ALKPHOS 107   ALT 29   AST 50*   BILITOT 1.1*    No results for input(s): PROTIME, INR, APTT, HEPANTIXA in the last 168 hours. No results for input(s): CPK, CPKMB, TROPONINI, MB in the last 168 hours.    Electrolytes: N/A - electrolytes WDL  Accuchecks: none    Gtts:   hydromorphone in 0.9 % NaCl 6 mg/30 ml         LDA/Wounds:  Lines/Drains/Airways       Peripherally Inserted Central Catheter Line  Duration             PICC Double Lumen 01/26/23 1209 left basilic 5 days              Drain   Duration                  Closed/Suction Drain 01/24/23 Posterior Back Accordion 10 Fr. 8 days         Closed/Suction Drain 01/24/23 Posterior;Right Back Accordion 10 Fr. 8 days    Female External Urinary Catheter 01/27/23 1115 4 days              Peripheral Intravenous Line  Duration                  Midline Catheter Insertion/Assessment  - Single Lumen 01/12/23 2138 Right basilic vein (medial side of arm) 18g x 10cm 19 days                  Wounds: Yes  Wound care consulted: No   Problem: Adult Inpatient Plan of Care  Goal: Plan of Care Review  Flowsheets (Taken 2/1/2023 0354)  Plan of Care Reviewed With: patient     Problem: Adjustment to Illness (Sepsis/Septic Shock)  Goal: Optimal Coping  Intervention: Optimize Psychosocial Adjustment to Illness  Flowsheets (Taken 2/1/2023 0354)  Supportive Measures:   active listening utilized   positive reinforcement provided   relaxation techniques promoted   verbalization of feelings encouraged  Family/Support System Care: support provided     Problem: Infection Progression (Sepsis/Septic Shock)  Goal: Absence of Infection Signs and Symptoms  Intervention: Initiate Sepsis Management  Flowsheets (Taken 2/1/2023 0354)  Infection Prevention: rest/sleep promoted  Infection Management: aseptic technique maintained  Stabilization Measures: airway opened  Isolation Precautions: precautions maintained

## 2023-02-01 NOTE — SUBJECTIVE & OBJECTIVE
Interval History: 2/1: NAEON, AFVSS, drain ouput 50 and 10. Hb 7.1, Plt 67 today. Needs platelet transfusion for ptl goal > 100 prior to OR    Medications:  Continuous Infusions:   hydromorphone in 0.9 % NaCl 6 mg/30 ml       Scheduled Meds:   acetaminophen  1,000 mg Oral TID    buPROPion  300 mg Oral Daily    gabapentin  600 mg Oral TID    lactulose  20 g Oral TID    meropenem (MERREM) IVPB  2 g Intravenous Q8H    methocarbamoL  1,000 mg Oral QID    pantoprazole  40 mg Oral Daily    polyethylene glycol  17 g Oral BID    senna-docusate 8.6-50 mg  2 tablet Oral BID    sodium chloride 0.9%  10 mL Intravenous Q6H     PRN Meds:sodium chloride, sodium chloride, dextrose 10%, dextrose 10%, diazePAM, glucagon (human recombinant), glucose, glucose, hydrALAZINE, labetalol, magnesium hydroxide 400 mg/5 ml, magnesium oxide, magnesium oxide, melatonin, naloxone, ondansetron, oxyCODONE, polyethylene glycol, potassium bicarbonate, potassium bicarbonate, potassium bicarbonate, potassium, sodium phosphates, potassium, sodium phosphates, potassium, sodium phosphates, prochlorperazine, sodium chloride 0.9%, Flushing PICC Protocol **AND** sodium chloride 0.9% **AND** sodium chloride 0.9%     Review of Systems  Objective:     Weight: 129.5 kg (285 lb 7.9 oz)  Body mass index is 44.71 kg/m².  Vital Signs (Most Recent):  Temp: 97.8 °F (36.6 °C) (02/01/23 0705)  Pulse: 105 (02/01/23 0905)  Resp: (!) 21 (02/01/23 0905)  BP: 106/60 (02/01/23 0905)  SpO2: 95 % (02/01/23 0905) Vital Signs (24h Range):  Temp:  [97.8 °F (36.6 °C)-98.7 °F (37.1 °C)] 97.8 °F (36.6 °C)  Pulse:  [] 105  Resp:  [12-39] 21  SpO2:  [95 %-100 %] 95 %  BP: ()/(49-69) 106/60     Date 02/01/23 0700 - 02/02/23 0659   Shift 0671-2192 3480-3515 6271-2312 24 Hour Total   INTAKE   P.O. 350   350   I.V.(mL/kg) 42(0.3)   42(0.3)   IV Piggyback 52.6   52.6   Shift Total(mL/kg) 444.6(3.4)   444.6(3.4)   OUTPUT   Urine(mL/kg/hr) 200   200   Shift Total(mL/kg) 200(1.5)    200(1.5)   Weight (kg) 129.5 129.5 129.5 129.5                            Neurosurgery Physical Exam  General: well developed, well nourished, no distress.   Head: normocephalic, atraumatic  Neurologic: Alert and oriented. Thought content appropriate.  GCS: Motor: 6/Verbal: 5/Eyes: 4 GCS Total: 15  Mental Status: Awake, Alert, Oriented x 4  Language: No aphasia  Speech: No dysarthria  Cranial nerves: face symmetric, tongue midline, CN II-XII grossly intact.   Eyes: pupils equal, round, reactive to light with accommodation, EOMI, nystagmus.   Pulmonary: normal respirations, no signs of respiratory distress  Abdomen: soft, non-distended, not tender to palpation  Skin: Skin is warm, dry and intact.  Sensory: intact to light touch throughout     Motor Strength:Moves all extremities spontaneously with good tone.  Full strength upper and extremities. No abnormal movements seen.      Strength   Deltoids Triceps Biceps Wrist Extension Wrist Flexion Hand    Upper: R 5/5 5/5 5/5 5/5 5/5 5/5     L 5/5 5/5 5/5 5/5 5/5 5/5       Iliopsoas Quadriceps Knee  Flexion Tibialis  anterior Gastro- cnemius EHL   Lower: R 3/5 4+/5 4+/5 4/5 4-/5 4/5     L 3/5 4+/5 4+/5 4/5 4-/5 4/5      Reflexes:   DTR: 2+ symmetrically throughout.  Parker's: Negative.  Babinski's: Present bilaterally  Clonus: 2 beats bilateral lower extremity     Incision well healed       Significant Labs:  Recent Labs   Lab 01/31/23 0209 02/01/23 0129    100   * 136   K 4.3 4.4    104   CO2 29 29   BUN 16 18   CREATININE 0.4* 0.5   CALCIUM 8.0* 7.6*   MG 1.8 1.8       Recent Labs   Lab 01/31/23 0209 02/01/23 0129   WBC 2.81* 2.45*   HGB 7.3* 7.1*   HCT 24.6* 23.5*   PLT 76* 67*       No results for input(s): LABPT, INR, APTT in the last 48 hours.    Microbiology Results (last 7 days)       Procedure Component Value Units Date/Time    Blood culture [[id number]] Collected: 01/27/23 0120    Order Status: Completed Specimen: Blood from Peripheral,  Forearm, Right Updated: 02/01/23 0612     Blood Culture, Routine No growth after 5 days.    Blood culture [[id number]] Collected: 01/27/23 0123    Order Status: Completed Specimen: Blood from Peripheral, Antecubital, Left Updated: 02/01/23 0612     Blood Culture, Routine No growth after 5 days.    Culture, Anaerobe [[id number]] Collected: 01/24/23 0943    Order Status: Completed Specimen: Bone from Back Updated: 01/30/23 0943     Anaerobic Culture No anaerobes isolated    Narrative:      4) Perispinal    Culture, Anaerobe [[id number]] Collected: 01/24/23 0943    Order Status: Completed Specimen: Bone from Back Updated: 01/30/23 0942     Anaerobic Culture No anaerobes isolated    Narrative:      3) Perispinal    Culture, Anaerobe [[id number]] Collected: 01/24/23 0913    Order Status: Completed Specimen: Body Fluid from Back Updated: 01/30/23 0942     Anaerobic Culture No anaerobes isolated    Narrative:      2) Perispinal    Culture, Anaerobe [[id number]] Collected: 01/24/23 0913    Order Status: Completed Specimen: Body Fluid from Back Updated: 01/30/23 0859     Anaerobic Culture No anaerobes isolated    Narrative:      1) Perispinal    Aerobic culture [[id number]]  (Abnormal)  (Susceptibility) Collected: 01/24/23 0943    Order Status: Completed Specimen: Bone from Back Updated: 01/28/23 1145     Aerobic Bacterial Culture ESCHERICHIA COLI ESBL  From broth only      Narrative:      4) Perispinal    Aerobic culture [[id number]]  (Abnormal)  (Susceptibility) Collected: 01/24/23 0914    Order Status: Completed Specimen: Wound from Back Updated: 01/26/23 1306     Aerobic Bacterial Culture ESCHERICHIA COLI  Rare      Narrative:      1) Perispinal    Aerobic culture [[id number]]  (Abnormal)  (Susceptibility) Collected: 01/24/23 0914    Order Status: Completed Specimen: Wound from Back Updated: 01/26/23 1306     Aerobic Bacterial Culture ESCHERICHIA COLI ESBL  Few      Narrative:      2) Perispinal    Aerobic culture [[id number]]   (Abnormal)  (Susceptibility) Collected: 01/24/23 0943    Order Status: Completed Specimen: Bone from Back Updated: 01/26/23 1252     Aerobic Bacterial Culture ESCHERICHIA COLI ESBL  Few      Narrative:      3) Perispinal    AFB Culture & Smear [[id number]] Collected: 01/24/23 0943    Order Status: Completed Specimen: Bone from Back Updated: 01/25/23 2127     AFB Culture & Smear Culture in progress     AFB CULTURE STAIN No acid fast bacilli seen.    Narrative:      4) Perispinal    AFB Culture & Smear [[id number]] Collected: 01/24/23 0943    Order Status: Completed Specimen: Bone from Back Updated: 01/25/23 2127     AFB Culture & Smear Culture in progress     AFB CULTURE STAIN No acid fast bacilli seen.    Narrative:      3) Perispinal    AFB Culture & Smear [[id number]] Collected: 01/24/23 0913    Order Status: Completed Specimen: Body Fluid from Back Updated: 01/25/23 2127     AFB Culture & Smear Culture in progress     AFB CULTURE STAIN No acid fast bacilli seen.    Narrative:      1) Perispinal    AFB Culture & Smear [[id number]] Collected: 01/24/23 0913    Order Status: Completed Specimen: Body Fluid from Back Updated: 01/25/23 2127     AFB Culture & Smear Culture in progress     AFB CULTURE STAIN No acid fast bacilli seen.    Narrative:      2) Perispinal    Blood culture [[id number]] Collected: 01/20/23 1131    Order Status: Completed Specimen: Blood Updated: 01/25/23 1412     Blood Culture, Routine No growth after 5 days.    Narrative:      Collection has been rescheduled by [facility] at 01/20/2023 11:03 Reason:   Patient unavailable in room with doctors   Collection has been rescheduled by [facility] at 01/20/2023 11:03 Reason:   Patient unavailable in room with doctors     Blood culture [[id number]] Collected: 01/20/23 1131    Order Status: Completed Specimen: Blood Updated: 01/25/23 1412     Blood Culture, Routine No growth after 5 days.    Narrative:      Collection has been rescheduled by [facility] at 01/20/2023 11:03 Reason:    Patient unavailable in room with doctors   Collection has been rescheduled by [facility] at 01/20/2023 11:03 Reason:   Patient unavailable in room with doctors           All pertinent labs from the last 24 hours have been reviewed.    Significant Diagnostics:  CT C-spine:  Vertebrae: The dens, lateral masses, and occipital condyles are intact.  There is no evidence of fracture or dislocation.     Discs: Multilevel disc height loss and degenerative endplate change.  Prominent C6 inferior endplate Schmorl's node, similar in appearance dating back to MRI from 02/14/2022.     Degenerative changes: Sent spinal canal dimensions at C3-C4 are 10.5 mm, 8.5 mm at C4-C5, 10.0 mm at C5-C6, and 10.8 mm at C6-C7 .  Multilevel uncovertebral spurring with multilevel neural foraminal narrowing most pronounced at C5-C6 with moderate right neural foraminal narrowing and C6-C7 with moderate left neural foraminal narrowing.    Physical Exam  Neurosurgery Physical Exam

## 2023-02-01 NOTE — ACP (ADVANCE CARE PLANNING)
Advance Care Planning     Date: 02/01/2023    Code Status  The patient requested to have a discussion regarding code status.  The patient wishes to have a natural, peaceful death.  Along those lines, the patient does not wish to have CPR or other invasive treatments performed when her heart stops. She still would like to be intubated for airway protection, hypoxia or hypercapnia if that problem should arise but not for an extended period of time. I communicated to the patient that a DNR order would be placed in her medical record to reflect this preference.  I spent a total of 31 minutes engaging the patient in this advance care planning discussion.

## 2023-02-01 NOTE — PLAN OF CARE
[facility] Care Plan    POC reviewed with Rachel Zazueta and family at 1400. Pt verbalized understanding. Questions and concerns addressed. No acute events today. Pt progressing toward goals. Will continue to monitor. See below and flowsheets for full assessment and VS info.     -PRN oxycodone given x2 for pain  -R HVD to gravity output 50cc  -L HVD to full sx output 10cc  -woundvac remains in place   -PCA pump dose weaned to 0.2x69aca  -2 BMs      Is this a stroke patient? no    Neuro:  Jane Coma Scale  Best Eye Response: 4-->(E4) spontaneous  Best Motor Response: 6-->(M6) obeys commands  Best Verbal Response: 5-->(V5) oriented  [place] Coma Scale Score: 15  Assessment Qualifiers: patient not sedated/intubated  Pupil PERRLA: yes     24 hr Temp:  [98 °F (36.7 °C)-98.7 °F (37.1 °C)]     CV:   Rhythm: sinus tachycardia  BP goals:   SBP < 140  MAP > 65    Resp:      Oxygen Concentration (%): 24    Plan: N/A    GI/:     Diet/Nutrition Received: regular  Last Bowel Movement: 01/31/23  Voiding Characteristics: external catheter    Intake/Output Summary (Last 24 hours) at 1/31/2023 1831  Last data filed at 1/31/2023 1805  Gross per 24 hour   Intake 1210.66 ml   Output 2450 ml   Net -1239.34 ml     Unmeasured Output  Urine Occurrence: 1  Stool Occurrence: 2  Pad Count: 4    Labs/Accuchecks:  Recent Labs   Lab 01/31/23  0209   WBC 2.81*   RBC 2.61*   HGB 7.3*   HCT 24.6*   PLT 76*      Recent Labs   Lab 01/31/23  0209   *   K 4.3   CO2 29      BUN 16   CREATININE 0.4*   ALKPHOS 108   ALT 32   AST 54*   BILITOT 1.1*      Recent Labs   Lab 01/25/23  0108   INR 1.4*   APTT 25.0    No results for input(s): CPK, CPKMB, TROPONINI, MB in the last 168 hours.    Electrolytes: N/A - electrolytes WDL  Accuchecks: none    Gtts:   hydromorphone in 0.9 % NaCl 6 mg/30 ml         LDA/Wounds:  Lines/Drains/Airways       Peripherally Inserted Central Catheter Line  Duration             PICC Double Lumen 01/26/23 1209 left  basilic 5 days              Drain  Duration                  Closed/Suction Drain 01/24/23 Posterior Back Accordion 10 Fr. 7 days         Closed/Suction Drain 01/24/23 Posterior;Right Back Accordion 10 Fr. 7 days    Female External Urinary Catheter 01/27/23 1115 4 days              Peripheral Intravenous Line  Duration                  Midline Catheter Insertion/Assessment  - Single Lumen 01/12/23 2138 Right basilic vein (medial side of arm) 18g x 10cm 18 days                  Wounds: Yes  Wound care consulted: Yes

## 2023-02-01 NOTE — PROGRESS NOTES
[facility] - Neuro Critical Care  Infectious Disease  Progress Note    Patient Name: Rachel Zazueta  MRN: [id number]  Admission Date: 1/19/2023  Length of Stay: 12 days  Attending Physician: Ace Rowley MD  Primary Care Provider: Dannielle Merino DO    Isolation Status: Contact  Assessment/Plan:      Thoracic discitis  42 year old female w/ chronic HCV cirrhosis, spinal fusion 4/2021, epidural abscess (GBS) with removal of hardware (02/2022), T10-pelvis spinal fusion with hardware (03/2022), obesity,  and neurogenic bladder with recent admission for ESBL E coli bacteremia from urinary source which was treated.then had - increasing weakness to lower extremities, and concerning MRI findings, transferred to [facility]. NSGY consulted, 2 stage revision was planned, she is status post temporary T6-12 Posterior Spinal Instrumented Fusion, operative cultures with E coli and ESBL E coli.  Pending further surgical intervention on 2/1. Blood cultures this admission with no growth.       Recommendations    - Continue Meropenem. Anticipate plan to de-escalate to ertapenem after next surgical intervention.         Anticipated Disposition: TBD    Thank you for your consult. I will follow-up with patient. Please contact us if you have any additional questions.    Tj Uribe MD  Infectious Disease  [facility] - Neuro Critical Care    Subjective:     Principal Problem:Weakness of both lower extremities    HPI: Ms. Zazueta is a 42-year-old woman known to ID with hx of IVDU, IV drug use (methamphetamine), chronic HCV with cirrhosis, chronic pancytopenia, spinal fusion (04/2021), epidural abscess (GBS) with removal of hardware (02/2022), T10-pelvis spinal fusion with hardware (03/2022), obesity,  and neurogenic bladder who was admitted to [facility] on 12/23/22 with back pain and increasing weakness in the lower extremities.       Urine and blood cx from that admission + for ESBL E coli which was treated with ertapenem.   Repeat blood cx on 12/28 and 1/10 negative.   Patient started on physical therapy but weakness in the lower extremities increased, and the patient is no longer able to walk. She is able to urinate intermittently and defecate.      An MRI cervical spine 1/10 was significant  for multilevel spondylosis.  MRI brain with nonspecific findings.  Xray of spine notable for focal kyphosis at T9-10 increased when compared to prior scoliosis radiographs.  An MRI of the spine was not available due to the patient's size. and she was transferred to [facility] for advanced imaging and Neurosurgery evaluation.      MRI  imaging here  on 1/20 concerning for worsening kyphosis, extensive edema in the paraspinal muscle. 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  Utox was positive for methadone, opiates, amphetamines, and THC.  Seen by ID at [facility] who recommend IV ceftriaxone X 6 weeks at [facility], but patient was not agreeable to this.  Offered  daily ceftriaxone infusions at infusion center.  At discharge from there she had received 8 days of ceftriaxone. Review of notes show she was subsequently prescribed 4 weeks of Bactrim 2 DS tabs twice daily.       No new subjective & objective note has been filed under this hospital service since the last note was generated.

## 2023-02-01 NOTE — ASSESSMENT & PLAN NOTE
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 Transferred to [facility] [place] [place] to  and neurosurgery was consulted    OR yesterday for temporary T6-12 PSIF. Taz pus noted intraoperatively.   Given intraoperative findings, new operative plan for T4-pelvis revision and extension of fusion with T9-10 corpectomy with plastic surgery assistance planned for 2/2/23    Plan:  Admitted to ICU, Keep in ICU on logroll precautions until stage 2 Thursday.    -- MRI C/T/L spine 1/20 with C7 SP enhancement, focal kyphosis at T8-10 with severe anterior height loss at T9, enhancing C6 inferior endplate Schmorl's node  -- CT T/L spine thin cut 1/20 with haloing of pelvic and T10 screws, spondylodiscitis T8-9, T9-10  -- CT C spine with concern for discitis at C6-7  -- flex ext XR done, limited view of C6-7 in swimmers view 2/2 shoulders  -- 1/20 ESR 70, CRP 9.4. elevated from prior   -- TLSO at bedside  --HV x2 R to gravity, L FS  --ID: merropenam   -- BCx 1/20: NGTD   -- OR cultures 1/24: E. coli  --OR 2/2/23 as above   -- will obtain informed consent   -- NPO 2/1/23 midnight   -- Hb 7.1, Plt 67 today. Needs platelet transfusion for ptl goal > 100 prior to OR  -- multimodal pain control per primary medicine team  NSGY will continue to follow. Please contact team with any further questions.

## 2023-02-01 NOTE — ASSESSMENT & PLAN NOTE
42 year old female w/ chronic HCV cirrhosis, spinal fusion 4/2021, epidural abscess (GBS) with removal of hardware (02/2022), T10-pelvis spinal fusion with hardware (03/2022), obesity,  and neurogenic bladder with recent admission for ESBL E coli bacteremia from urinary source which was treated.then had - increasing weakness to lower extremities, and concerning MRI findings, transferred to [facility] [place] [place]. NSGY consulted, 2 stage revision was planned, she is status post temporary T6-12 Posterior Spinal Instrumented Fusion, operative cultures with E coli and ESBL E coli.  Pending further surgical intervention on 2/1. Blood cultures this admission with no growth.       Recommendations    - Continue Meropenem. Anticipate plan to de-escalate to ertapenem after next surgical intervention.

## 2023-02-01 NOTE — OP NOTE
DATE OF SURGERY: 1/24/23     SURGEON: Mario Alberto Camilo M.D.     CO-SURGEON:  Guille Templeton M.D., Neurosurgery    PREOPERATIVE DIAGNOSIS:  1. T9 and T10 pathologic fracture dislocation secondary to spondylodiscitis with ongoing spinal cord compression and incomplete SCI  2. Hardware failure and osteomyelitis, T10 to pelvis  3. Sepsis  4. Class III obesity, polysubstance abuse and noncompliance with medical management     POSTOPERATIVE DIAGNOSIS:  1. T9 and T10 pathologic fracture dislocation secondary to spondylodiscitis with ongoing spinal cord compression and incomplete SCI  2. Hardware failure and osteomyelitis, T10 to pelvis  3. Sepsis  4. Class III obesity, polysubstance abuse and noncompliance with medical management     PROCEDURE PERFORMED:  1. Removal of posterior spinal hardware T10 to pelvis and reinsertion of hardware at T11 and T12  2. Wound washout and excisional debridement, T10 to pelvis, with placement of epidural antibiotic beads  3. T8, T9 and T10 laminectomy for severe central stenosis with spinal cord compression  4. Posterior segmental spinal fixation, T7-T12 (DeP[employer]s)  5.  Use of intraoperative CT neuronavigation    ANESTHESIA: GETA     ESTIMATED BLOOD LOSS: 1000mL     COMPLICATIONS: None     DRAINS: Two deep Hemovacs and a Prevena incisional woundvac     SPECIMENS SENT: Epidural cultures and explanted spinal hardware for culture and gross pathology     FINDINGS: None     INDICATIONS:     Please see Dr. Templeton's note for full description of the informed consent process.  .  PROCEDURE:     The patient was brought into the operating room where she was intubated and placed under general anesthesia without difficulty.  All lines were placed. She was repositioned prone onto the operating room table with appropriate padding all pressure points. The region of interest was localized with AP and lateral x-ray.  The skin was marked and the area was prepped and draped in the usual sterile fashion.  A  timeout was performed prior to procedure.  20 mL's of lidocaine with epinephrine were injected in the skin.     A midline linear incision was made with a 10 blade from approximately T4 to the pelvis, incorporating her previous midline scar.  Supra and subfascial midline dissection was carried out with Bovie electrocautery.  Subperiosteal dissection was carried out with Bovie electrocautery and Almazan elevators to expose the posterior elements from T4 to pelvis and the previous hardware from T10 to pelvis. The hardware was then removed and found to be grossly loose and covered in malodorous pus. All hte removed hardware was cultured and sent for gross pathology. Next we cultured the epidural space, excisionally debrided subcutaneous tissues, paraspinal muscles and spinal bone with sharp curettes. We then irrigated it with peroxide and Pulsevac lavage.        The CT neuro navigation array was docked onto a spinous process and a CT spin was acquired.  The intraoperative CT confirmed levels..  Using neuro-navigation pedicle screws were placed bilaterally at T7, T8, T11 and T12. At T11 and T12 the prior screw tracts were used with larger diameter screws. Xrays showed good screw position.     Next we performed a T8, T9 and T10 laminectomy in standard fashion with the high speed drill and rongeurs. This was performed for severe cord compression secondary to the kyphosis. During this maneuver we encountered a left sided durotomy which we repaired in watertight fashion with Gortex suture and Adherus. Adequate decompression of the spinal cord was achieved by this laminectomy as confirmed by Krishan palpation.     Bilateral titanium rods were sized, contoured and reduced into the tulip heads. We reduced some of hte kyphosis with gentle manipulation of the rods. Final xrays showed excellent reduction of the kyphosis and excellent position of all hardware.  The wound was copiously irrigated with Pulsevac lavage. Dissolvable  antibiotic beads were placed in the subfascial space. Two deep Hemovacs were placed, and the wound was closed by the neurosurgery service.     JUSTIFICATION OF CO-SURGEON AND MODIFIER 22: This was a complex multistage revision spinal infection and deformity operation in a Class III obese, medically noncompliant patient with active drug abuse. Inherent risks are significantly elevated over similar procedures for these reasons. Two attending surgeons were indicated to reduce operative times, blood loss, and to improve patient outcomes.

## 2023-02-01 NOTE — ASSESSMENT & PLAN NOTE
Patient has reportedly refused transplant evaluation in the past.   -Daily CMP and trend LFTs  -Continue Lactulose 20g TID   -Will need GI/hepatology follow up outpatient    MELD-Na score: 9 at 2/1/2023 11:13 AM  MELD score: 9 at 2/1/2023 11:13 AM  Calculated from:  Serum Creatinine: 0.5 mg/dL (Using min of 1 mg/dL) at 2/1/2023  1:29 AM  Serum Sodium: 136 mmol/L at 2/1/2023  1:29 AM  Total Bilirubin: 1.1 mg/dL at 2/1/2023  1:29 AM  INR(ratio): 1.2 at 2/1/2023 11:13 AM  Age: 42 years

## 2023-02-02 ENCOUNTER — ANESTHESIA (OUTPATIENT)
Dept: SURGERY | Facility: HOSPITAL | Age: 43
DRG: 453 | End: 2023-02-02
Payer: MEDICAID

## 2023-02-02 LAB
ALBUMIN SERPL BCP-MCNC: 2.1 G/DL (ref 3.5–5.2)
ALP SERPL-CCNC: 123 U/L (ref 55–135)
ALT SERPL W/O P-5'-P-CCNC: 32 U/L (ref 10–44)
ANION GAP SERPL CALC-SCNC: 7 MMOL/L (ref 8–16)
AST SERPL-CCNC: 51 U/L (ref 10–40)
BASOPHILS # BLD AUTO: 0.02 K/UL (ref 0–0.2)
BASOPHILS # BLD AUTO: 0.02 K/UL (ref 0–0.2)
BASOPHILS # BLD AUTO: 0.03 K/UL (ref 0–0.2)
BASOPHILS # BLD AUTO: ABNORMAL K/UL (ref 0–0.2)
BASOPHILS NFR BLD: 0.3 % (ref 0–1.9)
BASOPHILS NFR BLD: 0.6 % (ref 0–1.9)
BASOPHILS NFR BLD: 0.9 % (ref 0–1.9)
BASOPHILS NFR BLD: 2 % (ref 0–1.9)
BILIRUB SERPL-MCNC: 1.5 MG/DL (ref 0.1–1)
BLD PROD TYP BPU: NORMAL
BLOOD UNIT EXPIRATION DATE: NORMAL
BLOOD UNIT TYPE CODE: 5100
BLOOD UNIT TYPE CODE: 5100
BLOOD UNIT TYPE CODE: 6200
BLOOD UNIT TYPE: NORMAL
BUN SERPL-MCNC: 16 MG/DL (ref 6–20)
CALCIUM SERPL-MCNC: 8 MG/DL (ref 8.7–10.5)
CHLORIDE SERPL-SCNC: 102 MMOL/L (ref 95–110)
CO2 SERPL-SCNC: 27 MMOL/L (ref 23–29)
CODING SYSTEM: NORMAL
CREAT SERPL-MCNC: 0.5 MG/DL (ref 0.5–1.4)
DIFFERENTIAL METHOD: ABNORMAL
DISPENSE STATUS: NORMAL
EOSINOPHIL # BLD AUTO: 0 K/UL (ref 0–0.5)
EOSINOPHIL # BLD AUTO: 0.1 K/UL (ref 0–0.5)
EOSINOPHIL # BLD AUTO: 0.1 K/UL (ref 0–0.5)
EOSINOPHIL # BLD AUTO: ABNORMAL K/UL (ref 0–0.5)
EOSINOPHIL NFR BLD: 0.3 % (ref 0–8)
EOSINOPHIL NFR BLD: 3 % (ref 0–8)
EOSINOPHIL NFR BLD: 3.1 % (ref 0–8)
EOSINOPHIL NFR BLD: 3.5 % (ref 0–8)
ERYTHROCYTE [DISTWIDTH] IN BLOOD BY AUTOMATED COUNT: 17.2 % (ref 11.5–14.5)
ERYTHROCYTE [DISTWIDTH] IN BLOOD BY AUTOMATED COUNT: 18.5 % (ref 11.5–14.5)
ERYTHROCYTE [DISTWIDTH] IN BLOOD BY AUTOMATED COUNT: 18.6 % (ref 11.5–14.5)
ERYTHROCYTE [DISTWIDTH] IN BLOOD BY AUTOMATED COUNT: 18.6 % (ref 11.5–14.5)
EST. GFR  (NO RACE VARIABLE): >60 ML/MIN/1.73 M^2
GLUCOSE SERPL-MCNC: 101 MG/DL (ref 70–110)
GLUCOSE SERPL-MCNC: 102 MG/DL (ref 70–110)
GLUCOSE SERPL-MCNC: 107 MG/DL (ref 70–110)
GLUCOSE SERPL-MCNC: 118 MG/DL (ref 70–110)
GLUCOSE SERPL-MCNC: 123 MG/DL (ref 70–110)
HCO3 UR-SCNC: 29.2 MMOL/L (ref 24–28)
HCO3 UR-SCNC: 29.7 MMOL/L (ref 24–28)
HCO3 UR-SCNC: 30.4 MMOL/L (ref 24–28)
HCO3 UR-SCNC: 31.4 MMOL/L (ref 24–28)
HCT VFR BLD AUTO: 22.6 % (ref 37–48.5)
HCT VFR BLD AUTO: 23.8 % (ref 37–48.5)
HCT VFR BLD AUTO: 24.7 % (ref 37–48.5)
HCT VFR BLD AUTO: 25.1 % (ref 37–48.5)
HCT VFR BLD CALC: 21 %PCV (ref 36–54)
HGB BLD-MCNC: 7.1 G/DL (ref 12–16)
HGB BLD-MCNC: 7.3 G/DL (ref 12–16)
HGB BLD-MCNC: 7.4 G/DL (ref 12–16)
HGB BLD-MCNC: 7.8 G/DL (ref 12–16)
IMM GRANULOCYTES # BLD AUTO: 0.01 K/UL (ref 0–0.04)
IMM GRANULOCYTES # BLD AUTO: 0.01 K/UL (ref 0–0.04)
IMM GRANULOCYTES # BLD AUTO: 0.09 K/UL (ref 0–0.04)
IMM GRANULOCYTES # BLD AUTO: ABNORMAL K/UL (ref 0–0.04)
IMM GRANULOCYTES NFR BLD AUTO: 0.3 % (ref 0–0.5)
IMM GRANULOCYTES NFR BLD AUTO: 0.4 % (ref 0–0.5)
IMM GRANULOCYTES NFR BLD AUTO: 0.9 % (ref 0–0.5)
IMM GRANULOCYTES NFR BLD AUTO: ABNORMAL % (ref 0–0.5)
LYMPHOCYTES # BLD AUTO: 0.5 K/UL (ref 1–4.8)
LYMPHOCYTES # BLD AUTO: ABNORMAL K/UL (ref 1–4.8)
LYMPHOCYTES NFR BLD: 14.6 % (ref 18–48)
LYMPHOCYTES NFR BLD: 16 % (ref 18–48)
LYMPHOCYTES NFR BLD: 19.7 % (ref 18–48)
LYMPHOCYTES NFR BLD: 4.9 % (ref 18–48)
MAGNESIUM SERPL-MCNC: 1.8 MG/DL (ref 1.6–2.6)
MCH RBC QN AUTO: 28.1 PG (ref 27–31)
MCH RBC QN AUTO: 28.4 PG (ref 27–31)
MCH RBC QN AUTO: 28.9 PG (ref 27–31)
MCH RBC QN AUTO: 29 PG (ref 27–31)
MCHC RBC AUTO-ENTMCNC: 30 G/DL (ref 32–36)
MCHC RBC AUTO-ENTMCNC: 30.7 G/DL (ref 32–36)
MCHC RBC AUTO-ENTMCNC: 31.1 G/DL (ref 32–36)
MCHC RBC AUTO-ENTMCNC: 31.4 G/DL (ref 32–36)
MCV RBC AUTO: 92 FL (ref 82–98)
MCV RBC AUTO: 93 FL (ref 82–98)
MCV RBC AUTO: 93 FL (ref 82–98)
MCV RBC AUTO: 94 FL (ref 82–98)
MONOCYTES # BLD AUTO: 0.2 K/UL (ref 0.3–1)
MONOCYTES # BLD AUTO: 0.3 K/UL (ref 0.3–1)
MONOCYTES # BLD AUTO: 0.3 K/UL (ref 0.3–1)
MONOCYTES # BLD AUTO: ABNORMAL K/UL (ref 0.3–1)
MONOCYTES NFR BLD: 1.7 % (ref 4–15)
MONOCYTES NFR BLD: 14.8 % (ref 4–15)
MONOCYTES NFR BLD: 6 % (ref 4–15)
MONOCYTES NFR BLD: 9.7 % (ref 4–15)
NEUTROPHILS # BLD AUTO: 1.4 K/UL (ref 1.8–7.7)
NEUTROPHILS # BLD AUTO: 2.3 K/UL (ref 1.8–7.7)
NEUTROPHILS # BLD AUTO: 9.1 K/UL (ref 1.8–7.7)
NEUTROPHILS # BLD AUTO: ABNORMAL K/UL (ref 1.8–7.7)
NEUTROPHILS NFR BLD: 60.7 % (ref 38–73)
NEUTROPHILS NFR BLD: 71.7 % (ref 38–73)
NEUTROPHILS NFR BLD: 73 % (ref 38–73)
NEUTROPHILS NFR BLD: 91.9 % (ref 38–73)
NRBC BLD-RTO: 0 /100 WBC
NUM UNITS TRANS FFP: NORMAL
PCO2 BLDA: 44.2 MMHG (ref 35–45)
PCO2 BLDA: 46.3 MMHG (ref 35–45)
PCO2 BLDA: 46.7 MMHG (ref 35–45)
PCO2 BLDA: 46.8 MMHG (ref 35–45)
PH SMN: 7.4 [PH] (ref 7.35–7.45)
PH SMN: 7.41 [PH] (ref 7.35–7.45)
PH SMN: 7.44 [PH] (ref 7.35–7.45)
PH SMN: 7.45 [PH] (ref 7.35–7.45)
PHOSPHATE SERPL-MCNC: 2.3 MG/DL (ref 2.7–4.5)
PLATELET # BLD AUTO: 158 K/UL (ref 150–450)
PLATELET # BLD AUTO: 83 K/UL (ref 150–450)
PLATELET # BLD AUTO: 90 K/UL (ref 150–450)
PLATELET # BLD AUTO: 97 K/UL (ref 150–450)
PLATELET BLD QL SMEAR: ABNORMAL
PMV BLD AUTO: 10.2 FL (ref 9.2–12.9)
PMV BLD AUTO: 10.4 FL (ref 9.2–12.9)
PMV BLD AUTO: 10.5 FL (ref 9.2–12.9)
PMV BLD AUTO: 9.6 FL (ref 9.2–12.9)
PO2 BLDA: 135 MMHG (ref 80–100)
PO2 BLDA: 141 MMHG (ref 80–100)
PO2 BLDA: 316 MMHG (ref 80–100)
PO2 BLDA: 366 MMHG (ref 80–100)
POC BE: 4 MMOL/L
POC BE: 5 MMOL/L
POC BE: 6 MMOL/L
POC BE: 7 MMOL/L
POC IONIZED CALCIUM: 1.1 MMOL/L (ref 1.06–1.42)
POC IONIZED CALCIUM: 1.1 MMOL/L (ref 1.06–1.42)
POC IONIZED CALCIUM: 1.13 MMOL/L (ref 1.06–1.42)
POC IONIZED CALCIUM: 1.15 MMOL/L (ref 1.06–1.42)
POC SATURATED O2: 100 % (ref 95–100)
POC SATURATED O2: 100 % (ref 95–100)
POC SATURATED O2: 99 % (ref 95–100)
POC SATURATED O2: 99 % (ref 95–100)
POC TCO2: 31 MMOL/L (ref 23–27)
POC TCO2: 31 MMOL/L (ref 23–27)
POC TCO2: 32 MMOL/L (ref 23–27)
POC TCO2: 33 MMOL/L (ref 23–27)
POTASSIUM BLD-SCNC: 4.4 MMOL/L (ref 3.5–5.1)
POTASSIUM BLD-SCNC: 4.4 MMOL/L (ref 3.5–5.1)
POTASSIUM BLD-SCNC: 4.6 MMOL/L (ref 3.5–5.1)
POTASSIUM BLD-SCNC: 4.6 MMOL/L (ref 3.5–5.1)
POTASSIUM SERPL-SCNC: 4.3 MMOL/L (ref 3.5–5.1)
PROT SERPL-MCNC: 5.3 G/DL (ref 6–8.4)
RBC # BLD AUTO: 2.46 M/UL (ref 4–5.4)
RBC # BLD AUTO: 2.57 M/UL (ref 4–5.4)
RBC # BLD AUTO: 2.63 M/UL (ref 4–5.4)
RBC # BLD AUTO: 2.69 M/UL (ref 4–5.4)
SAMPLE: ABNORMAL
SODIUM BLD-SCNC: 135 MMOL/L (ref 136–145)
SODIUM BLD-SCNC: 136 MMOL/L (ref 136–145)
SODIUM SERPL-SCNC: 136 MMOL/L (ref 136–145)
TRANS ERYTHROCYTES VOL PATIENT: NORMAL ML
UNIT NUMBER: NORMAL
WBC # BLD AUTO: 1.88 K/UL (ref 3.9–12.7)
WBC # BLD AUTO: 2.29 K/UL (ref 3.9–12.7)
WBC # BLD AUTO: 3.21 K/UL (ref 3.9–12.7)
WBC # BLD AUTO: 9.91 K/UL (ref 3.9–12.7)

## 2023-02-02 PROCEDURE — 27201423 OPTIME MED/SURG SUP & DEVICES STERILE SUPPLY: Performed by: NEUROLOGICAL SURGERY

## 2023-02-02 PROCEDURE — 25000003 PHARM REV CODE 250: Performed by: NURSE ANESTHETIST, CERTIFIED REGISTERED

## 2023-02-02 PROCEDURE — 22612 ARTHRD PST TQ 1NTRSPC LUMBAR: CPT | Mod: 51,62,58, | Performed by: NEUROLOGICAL SURGERY

## 2023-02-02 PROCEDURE — 22534 PR ARTHRODESIS LATERAL EXTRACAVITARY EA ADDL THRC/LMBR: ICD-10-PCS | Mod: 62,,, | Performed by: ORTHOPAEDIC SURGERY

## 2023-02-02 PROCEDURE — 83735 ASSAY OF MAGNESIUM: CPT | Performed by: STUDENT IN AN ORGANIZED HEALTH CARE EDUCATION/TRAINING PROGRAM

## 2023-02-02 PROCEDURE — 22844 PR POSTERIOR SEGMENTAL INSTRUMENTATION 13/> VRT SEG: ICD-10-PCS | Mod: 80,,, | Performed by: ORTHOPAEDIC SURGERY

## 2023-02-02 PROCEDURE — 63600175 PHARM REV CODE 636 W HCPCS: Performed by: PSYCHIATRY & NEUROLOGY

## 2023-02-02 PROCEDURE — 22612 ARTHRD PST TQ 1NTRSPC LUMBAR: CPT | Mod: 62,51,58, | Performed by: ORTHOPAEDIC SURGERY

## 2023-02-02 PROCEDURE — 13101 PR RECMPL WND TRUNK 2.6-7.5 CM: ICD-10-PCS | Mod: 59,,, | Performed by: SURGERY

## 2023-02-02 PROCEDURE — 20930 SP BONE ALGRFT MORSEL ADD-ON: CPT | Mod: ,,, | Performed by: NEUROLOGICAL SURGERY

## 2023-02-02 PROCEDURE — 99233 PR SUBSEQUENT HOSPITAL CARE,LEVL III: ICD-10-PCS | Mod: ,,, | Performed by: PSYCHIATRY & NEUROLOGY

## 2023-02-02 PROCEDURE — 22532 ARTHRD LAT XTRCVTRY TQ THRC: CPT | Mod: 62,51,58, | Performed by: ORTHOPAEDIC SURGERY

## 2023-02-02 PROCEDURE — 25000003 PHARM REV CODE 250: Performed by: NEUROLOGICAL SURGERY

## 2023-02-02 PROCEDURE — P9035 PLATELET PHERES LEUKOREDUCED: HCPCS | Performed by: NURSE PRACTITIONER

## 2023-02-02 PROCEDURE — 63600175 PHARM REV CODE 636 W HCPCS: Performed by: NURSE ANESTHETIST, CERTIFIED REGISTERED

## 2023-02-02 PROCEDURE — 37000008 HC ANESTHESIA 1ST 15 MINUTES: Performed by: NEUROLOGICAL SURGERY

## 2023-02-02 PROCEDURE — 22844 PR POSTERIOR SEGMENTAL INSTRUMENTATION 13/> VRT SEG: ICD-10-PCS | Mod: ,,, | Performed by: NEUROLOGICAL SURGERY

## 2023-02-02 PROCEDURE — 22614 PR ARTHRODESIS, POST/POSTLAT, SNGL INTERSPACE, EA ADDTL: ICD-10-PCS | Mod: 62,,, | Performed by: NEUROLOGICAL SURGERY

## 2023-02-02 PROCEDURE — A4216 STERILE WATER/SALINE, 10 ML: HCPCS | Performed by: PSYCHIATRY & NEUROLOGY

## 2023-02-02 PROCEDURE — 36000710: Performed by: NEUROLOGICAL SURGERY

## 2023-02-02 PROCEDURE — D9220A PRA ANESTHESIA: Mod: ANES,,, | Performed by: ANESTHESIOLOGY

## 2023-02-02 PROCEDURE — 22854 INSJ BIOMECHANICAL DEVICE: CPT | Mod: ,,, | Performed by: NEUROLOGICAL SURGERY

## 2023-02-02 PROCEDURE — 63600175 PHARM REV CODE 636 W HCPCS

## 2023-02-02 PROCEDURE — P9017 PLASMA 1 DONOR FRZ W/IN 8 HR: HCPCS | Performed by: ANESTHESIOLOGY

## 2023-02-02 PROCEDURE — 22534 ARTHRD LAT XTRCVTRY TQ EA AD: CPT | Mod: 62,,, | Performed by: ORTHOPAEDIC SURGERY

## 2023-02-02 PROCEDURE — C1729 CATH, DRAINAGE: HCPCS | Performed by: NEUROLOGICAL SURGERY

## 2023-02-02 PROCEDURE — 22844 INSERT SPINE FIXATION DEVICE: CPT | Mod: 80,,, | Performed by: ORTHOPAEDIC SURGERY

## 2023-02-02 PROCEDURE — 22612 PR ARTHRODESIS, POST/POSTLAT, SNGL INTERSPACE, LUMBAR: ICD-10-PCS | Mod: 51,62,58, | Performed by: NEUROLOGICAL SURGERY

## 2023-02-02 PROCEDURE — P9021 RED BLOOD CELLS UNIT: HCPCS | Performed by: PSYCHIATRY & NEUROLOGY

## 2023-02-02 PROCEDURE — 37000009 HC ANESTHESIA EA ADD 15 MINS: Performed by: NEUROLOGICAL SURGERY

## 2023-02-02 PROCEDURE — D9220A PRA ANESTHESIA: ICD-10-PCS | Mod: CRNA,,, | Performed by: NURSE ANESTHETIST, CERTIFIED REGISTERED

## 2023-02-02 PROCEDURE — 36000711: Performed by: NEUROLOGICAL SURGERY

## 2023-02-02 PROCEDURE — 63103 PR REMOVE VERTEBRAL BODY ADD-ON: ICD-10-PCS | Mod: 62,,, | Performed by: ORTHOPAEDIC SURGERY

## 2023-02-02 PROCEDURE — 13101 CMPLX RPR TRUNK 2.6-7.5 CM: CPT | Mod: 59,,, | Performed by: SURGERY

## 2023-02-02 PROCEDURE — 20930 PR ALLOGRAFT FOR SPINE SURGERY ONLY MORSELIZED: ICD-10-PCS | Mod: ,,, | Performed by: NEUROLOGICAL SURGERY

## 2023-02-02 PROCEDURE — 63101 PR REMV VERT BODY, LATERAL, THORACIC: ICD-10-PCS | Mod: 62,58,, | Performed by: ORTHOPAEDIC SURGERY

## 2023-02-02 PROCEDURE — 85025 COMPLETE CBC W/AUTO DIFF WBC: CPT | Mod: 91

## 2023-02-02 PROCEDURE — 99233 SBSQ HOSP IP/OBS HIGH 50: CPT | Mod: ,,, | Performed by: PSYCHIATRY & NEUROLOGY

## 2023-02-02 PROCEDURE — 63101 PR REMV VERT BODY, LATERAL, THORACIC: ICD-10-PCS | Mod: 62,58,, | Performed by: NEUROLOGICAL SURGERY

## 2023-02-02 PROCEDURE — P9045 ALBUMIN (HUMAN), 5%, 250 ML: HCPCS | Mod: JG | Performed by: NURSE ANESTHETIST, CERTIFIED REGISTERED

## 2023-02-02 PROCEDURE — 63600175 PHARM REV CODE 636 W HCPCS: Mod: JG | Performed by: STUDENT IN AN ORGANIZED HEALTH CARE EDUCATION/TRAINING PROGRAM

## 2023-02-02 PROCEDURE — 63101 REMOVE VERT BODY DCMPRN THRC: CPT | Mod: 62,58,, | Performed by: NEUROLOGICAL SURGERY

## 2023-02-02 PROCEDURE — 22534 PR ARTHRODESIS LATERAL EXTRACAVITARY EA ADDL THRC/LMBR: ICD-10-PCS | Mod: 62,,, | Performed by: NEUROLOGICAL SURGERY

## 2023-02-02 PROCEDURE — 63103 REMOVE VERTEBRAL BODY ADD-ON: CPT | Mod: 62,,, | Performed by: NEUROLOGICAL SURGERY

## 2023-02-02 PROCEDURE — 85025 COMPLETE CBC W/AUTO DIFF WBC: CPT | Performed by: PSYCHIATRY & NEUROLOGY

## 2023-02-02 PROCEDURE — 13102 PR REP,SKIN,TRUNK,CMPLX,+5 CM/<: ICD-10-PCS | Mod: 59,,, | Performed by: SURGERY

## 2023-02-02 PROCEDURE — 22614 ARTHRD PST TQ 1NTRSPC EA ADD: CPT | Mod: 62,,, | Performed by: ORTHOPAEDIC SURGERY

## 2023-02-02 PROCEDURE — D9220A PRA ANESTHESIA: ICD-10-PCS | Mod: ANES,,, | Performed by: ANESTHESIOLOGY

## 2023-02-02 PROCEDURE — 36620 INSERTION CATHETER ARTERY: CPT | Mod: 59,,, | Performed by: ANESTHESIOLOGY

## 2023-02-02 PROCEDURE — 63103 REMOVE VERTEBRAL BODY ADD-ON: CPT | Mod: 62,,, | Performed by: ORTHOPAEDIC SURGERY

## 2023-02-02 PROCEDURE — 61783 PR STEREOTACTIC COMP ASSIST PROC,SPINAL: ICD-10-PCS | Mod: ,,, | Performed by: NEUROLOGICAL SURGERY

## 2023-02-02 PROCEDURE — 85027 COMPLETE CBC AUTOMATED: CPT | Performed by: PSYCHIATRY & NEUROLOGY

## 2023-02-02 PROCEDURE — P9021 RED BLOOD CELLS UNIT: HCPCS | Performed by: NEUROLOGICAL SURGERY

## 2023-02-02 PROCEDURE — 84100 ASSAY OF PHOSPHORUS: CPT | Performed by: STUDENT IN AN ORGANIZED HEALTH CARE EDUCATION/TRAINING PROGRAM

## 2023-02-02 PROCEDURE — 63103 PR REMOVE VERTEBRAL BODY ADD-ON: ICD-10-PCS | Mod: 62,,, | Performed by: NEUROLOGICAL SURGERY

## 2023-02-02 PROCEDURE — 80053 COMPREHEN METABOLIC PANEL: CPT | Performed by: STUDENT IN AN ORGANIZED HEALTH CARE EDUCATION/TRAINING PROGRAM

## 2023-02-02 PROCEDURE — 15734 PR MUSCLE-SKIN FLAP,TRUNK: ICD-10-PCS | Mod: ,,, | Performed by: SURGERY

## 2023-02-02 PROCEDURE — 25000003 PHARM REV CODE 250

## 2023-02-02 PROCEDURE — 22532 ARTHRD LAT XTRCVTRY TQ THRC: CPT | Mod: 62,51,58, | Performed by: NEUROLOGICAL SURGERY

## 2023-02-02 PROCEDURE — 22844 INSERT SPINE FIXATION DEVICE: CPT | Mod: ,,, | Performed by: NEUROLOGICAL SURGERY

## 2023-02-02 PROCEDURE — 22854 INSJ BIOMECHANICAL DEVICE: CPT | Mod: 80,,, | Performed by: ORTHOPAEDIC SURGERY

## 2023-02-02 PROCEDURE — P9035 PLATELET PHERES LEUKOREDUCED: HCPCS | Performed by: STUDENT IN AN ORGANIZED HEALTH CARE EDUCATION/TRAINING PROGRAM

## 2023-02-02 PROCEDURE — 27000221 HC OXYGEN, UP TO 24 HOURS

## 2023-02-02 PROCEDURE — 22848 INSERT PELV FIXATION DEVICE: CPT | Mod: ,,, | Performed by: NEUROLOGICAL SURGERY

## 2023-02-02 PROCEDURE — 22612 PR ARTHRODESIS, POST/POSTLAT, SNGL INTERSPACE, LUMBAR: ICD-10-PCS | Mod: 62,51,58, | Performed by: ORTHOPAEDIC SURGERY

## 2023-02-02 PROCEDURE — 25000003 PHARM REV CODE 250: Performed by: STUDENT IN AN ORGANIZED HEALTH CARE EDUCATION/TRAINING PROGRAM

## 2023-02-02 PROCEDURE — 22532 PR ARTHRODESIS LATERAL EXTRACAVITARY THORACIC: ICD-10-PCS | Mod: 62,51,58, | Performed by: ORTHOPAEDIC SURGERY

## 2023-02-02 PROCEDURE — 20000000 HC ICU ROOM

## 2023-02-02 PROCEDURE — 85007 BL SMEAR W/DIFF WBC COUNT: CPT | Performed by: PSYCHIATRY & NEUROLOGY

## 2023-02-02 PROCEDURE — 22848 PR PELVIC FIXATION OTHER THAN SACRUM: ICD-10-PCS | Mod: ,,, | Performed by: NEUROLOGICAL SURGERY

## 2023-02-02 PROCEDURE — 27201037 HC PRESSURE MONITORING SET UP

## 2023-02-02 PROCEDURE — 22854 PR INS BIOMECH DEV, VERT CORPECTOMY W/INTRBODY ARTHRODESIS EA INTERSPC: ICD-10-PCS | Mod: ,,, | Performed by: NEUROLOGICAL SURGERY

## 2023-02-02 PROCEDURE — 61783 SCAN PROC SPINAL: CPT | Mod: ,,, | Performed by: NEUROLOGICAL SURGERY

## 2023-02-02 PROCEDURE — 22848 INSERT PELV FIXATION DEVICE: CPT | Mod: 80,,, | Performed by: ORTHOPAEDIC SURGERY

## 2023-02-02 PROCEDURE — 27000207 HC ISOLATION

## 2023-02-02 PROCEDURE — 22532 PR ARTHRODESIS LATERAL EXTRACAVITARY THORACIC: ICD-10-PCS | Mod: 62,51,58, | Performed by: NEUROLOGICAL SURGERY

## 2023-02-02 PROCEDURE — 27800903 OPTIME MED/SURG SUP & DEVICES OTHER IMPLANTS: Performed by: NEUROLOGICAL SURGERY

## 2023-02-02 PROCEDURE — 15734 MUSCLE-SKIN GRAFT TRUNK: CPT | Mod: ,,, | Performed by: SURGERY

## 2023-02-02 PROCEDURE — 22614 ARTHRD PST TQ 1NTRSPC EA ADD: CPT | Mod: 62,,, | Performed by: NEUROLOGICAL SURGERY

## 2023-02-02 PROCEDURE — 25000003 PHARM REV CODE 250: Performed by: PSYCHIATRY & NEUROLOGY

## 2023-02-02 PROCEDURE — D9220A PRA ANESTHESIA: Mod: CRNA,,, | Performed by: NURSE ANESTHETIST, CERTIFIED REGISTERED

## 2023-02-02 PROCEDURE — 22848 PR PELVIC FIXATION OTHER THAN SACRUM: ICD-10-PCS | Mod: 80,,, | Performed by: ORTHOPAEDIC SURGERY

## 2023-02-02 PROCEDURE — 94761 N-INVAS EAR/PLS OXIMETRY MLT: CPT

## 2023-02-02 PROCEDURE — 36620 ARTERIAL: ICD-10-PCS | Mod: 59,,, | Performed by: ANESTHESIOLOGY

## 2023-02-02 PROCEDURE — 63101 REMOVE VERT BODY DCMPRN THRC: CPT | Mod: 62,58,, | Performed by: ORTHOPAEDIC SURGERY

## 2023-02-02 PROCEDURE — C1713 ANCHOR/SCREW BN/BN,TIS/BN: HCPCS | Performed by: NEUROLOGICAL SURGERY

## 2023-02-02 PROCEDURE — 63600175 PHARM REV CODE 636 W HCPCS: Performed by: STUDENT IN AN ORGANIZED HEALTH CARE EDUCATION/TRAINING PROGRAM

## 2023-02-02 PROCEDURE — 85025 COMPLETE CBC W/AUTO DIFF WBC: CPT | Mod: 91 | Performed by: NEUROLOGICAL SURGERY

## 2023-02-02 PROCEDURE — 22534 ARTHRD LAT XTRCVTRY TQ EA AD: CPT | Mod: 62,,, | Performed by: NEUROLOGICAL SURGERY

## 2023-02-02 PROCEDURE — 22614 PR ARTHRODESIS, POST/POSTLAT, SNGL INTERSPACE, EA ADDTL: ICD-10-PCS | Mod: 62,,, | Performed by: ORTHOPAEDIC SURGERY

## 2023-02-02 PROCEDURE — 22854 PR INS BIOMECH DEV, VERT CORPECTOMY W/INTRBODY ARTHRODESIS EA INTERSPC: ICD-10-PCS | Mod: 80,,, | Performed by: ORTHOPAEDIC SURGERY

## 2023-02-02 PROCEDURE — 63600175 PHARM REV CODE 636 W HCPCS: Performed by: NEUROLOGICAL SURGERY

## 2023-02-02 PROCEDURE — 13102 CMPLX RPR TRUNK ADDL 5CM/<: CPT | Mod: 59,,, | Performed by: SURGERY

## 2023-02-02 DEVICE — IMPLANTABLE DEVICE: Type: IMPLANTABLE DEVICE | Site: BACK | Status: FUNCTIONAL

## 2023-02-02 DEVICE — SCREW POLYAXIAL 5X35MM: Type: IMPLANTABLE DEVICE | Site: BACK | Status: FUNCTIONAL

## 2023-02-02 DEVICE — SCREW INNER SINGLE SET TITANIU: Type: IMPLANTABLE DEVICE | Site: BACK | Status: FUNCTIONAL

## 2023-02-02 DEVICE — SCREW BONE SPINAL 5.5 7 X 40MM: Type: IMPLANTABLE DEVICE | Site: BACK | Status: FUNCTIONAL

## 2023-02-02 DEVICE — KIT BONE GRFT BMP XS: Type: IMPLANTABLE DEVICE | Site: BACK | Status: FUNCTIONAL

## 2023-02-02 DEVICE — BONE 30CC CANCELLOUS CRUSHED: Type: IMPLANTABLE DEVICE | Site: BACK | Status: FUNCTIONAL

## 2023-02-02 DEVICE — GRAFT ALTAPORE BIOACTIVE 10ML: Type: IMPLANTABLE DEVICE | Site: BACK | Status: FUNCTIONAL

## 2023-02-02 DEVICE — GRAFT ALTAPORE BIOACTIVE 2.5ML: Type: IMPLANTABLE DEVICE | Site: BACK | Status: FUNCTIONAL

## 2023-02-02 DEVICE — SCREW BONE SPINAL 5.5 7 X 45MM: Type: IMPLANTABLE DEVICE | Site: BACK | Status: FUNCTIONAL

## 2023-02-02 DEVICE — SET SCREW EXPEDIUM VERSE UNITZ: Type: IMPLANTABLE DEVICE | Site: BACK | Status: FUNCTIONAL

## 2023-02-02 DEVICE — ROD SPINE MTRX STR 400X80MM: Type: IMPLANTABLE DEVICE | Site: BACK | Status: FUNCTIONAL

## 2023-02-02 DEVICE — CONNECTOR SPINAL SFX A5 5.5MM: Type: IMPLANTABLE DEVICE | Site: BACK | Status: FUNCTIONAL

## 2023-02-02 DEVICE — ENDPLATE FORTIFY LOWER 26MM: Type: IMPLANTABLE DEVICE | Site: BACK | Status: FUNCTIONAL

## 2023-02-02 DEVICE — SCREW EXPEDIUM FEN STRL 5X30MM: Type: IMPLANTABLE DEVICE | Site: BACK | Status: FUNCTIONAL

## 2023-02-02 DEVICE — CHIPS CANCELLOUS 30CC: Type: IMPLANTABLE DEVICE | Site: BACK | Status: FUNCTIONAL

## 2023-02-02 DEVICE — CONNECTOR SPINAL OPENSIDE 5.5: Type: IMPLANTABLE DEVICE | Site: BACK | Status: FUNCTIONAL

## 2023-02-02 RX ORDER — PHENYLEPHRINE HYDROCHLORIDE 10 MG/ML
INJECTION INTRAVENOUS CONTINUOUS PRN
Status: DISCONTINUED | OUTPATIENT
Start: 2023-02-02 | End: 2023-02-02

## 2023-02-02 RX ORDER — PROPOFOL 10 MG/ML
VIAL (ML) INTRAVENOUS
Status: DISCONTINUED | OUTPATIENT
Start: 2023-02-02 | End: 2023-02-02

## 2023-02-02 RX ORDER — MAGNESIUM SULFATE HEPTAHYDRATE 500 MG/ML
INJECTION, SOLUTION INTRAMUSCULAR; INTRAVENOUS
Status: DISCONTINUED | OUTPATIENT
Start: 2023-02-02 | End: 2023-02-02

## 2023-02-02 RX ORDER — ROCURONIUM BROMIDE 10 MG/ML
INJECTION, SOLUTION INTRAVENOUS
Status: DISCONTINUED | OUTPATIENT
Start: 2023-02-02 | End: 2023-02-02

## 2023-02-02 RX ORDER — MUPIROCIN 20 MG/G
OINTMENT TOPICAL 2 TIMES DAILY
Status: CANCELLED | OUTPATIENT
Start: 2023-02-02 | End: 2023-02-07

## 2023-02-02 RX ORDER — VANCOMYCIN HYDROCHLORIDE 1 G/20ML
INJECTION, POWDER, LYOPHILIZED, FOR SOLUTION INTRAVENOUS
Status: DISCONTINUED | OUTPATIENT
Start: 2023-02-02 | End: 2023-02-02 | Stop reason: HOSPADM

## 2023-02-02 RX ORDER — HYDROCODONE BITARTRATE AND ACETAMINOPHEN 500; 5 MG/1; MG/1
TABLET ORAL
Status: DISCONTINUED | OUTPATIENT
Start: 2023-02-02 | End: 2023-02-06

## 2023-02-02 RX ORDER — HYDROMORPHONE HYDROCHLORIDE 2 MG/ML
INJECTION, SOLUTION INTRAMUSCULAR; INTRAVENOUS; SUBCUTANEOUS
Status: DISCONTINUED | OUTPATIENT
Start: 2023-02-02 | End: 2023-02-02

## 2023-02-02 RX ORDER — MIDAZOLAM HYDROCHLORIDE 1 MG/ML
INJECTION, SOLUTION INTRAMUSCULAR; INTRAVENOUS
Status: DISCONTINUED | OUTPATIENT
Start: 2023-02-02 | End: 2023-02-02

## 2023-02-02 RX ORDER — PROPOFOL 10 MG/ML
VIAL (ML) INTRAVENOUS CONTINUOUS PRN
Status: DISCONTINUED | OUTPATIENT
Start: 2023-02-02 | End: 2023-02-02

## 2023-02-02 RX ORDER — DEXMEDETOMIDINE HYDROCHLORIDE 100 UG/ML
INJECTION, SOLUTION INTRAVENOUS
Status: DISCONTINUED | OUTPATIENT
Start: 2023-02-02 | End: 2023-02-02

## 2023-02-02 RX ORDER — DEXAMETHASONE SODIUM PHOSPHATE 4 MG/ML
INJECTION, SOLUTION INTRA-ARTICULAR; INTRALESIONAL; INTRAMUSCULAR; INTRAVENOUS; SOFT TISSUE
Status: DISCONTINUED | OUTPATIENT
Start: 2023-02-02 | End: 2023-02-02

## 2023-02-02 RX ORDER — HYDROMORPHONE HYDROCHLORIDE 1 MG/ML
2 INJECTION, SOLUTION INTRAMUSCULAR; INTRAVENOUS; SUBCUTANEOUS ONCE
Status: COMPLETED | OUTPATIENT
Start: 2023-02-02 | End: 2023-02-02

## 2023-02-02 RX ORDER — SODIUM CHLORIDE, SODIUM LACTATE, POTASSIUM CHLORIDE, CALCIUM CHLORIDE 600; 310; 30; 20 MG/100ML; MG/100ML; MG/100ML; MG/100ML
INJECTION, SOLUTION INTRAVENOUS CONTINUOUS
Status: CANCELLED | OUTPATIENT
Start: 2023-02-02

## 2023-02-02 RX ORDER — HALOPERIDOL 5 MG/ML
INJECTION INTRAMUSCULAR
Status: DISCONTINUED | OUTPATIENT
Start: 2023-02-02 | End: 2023-02-02

## 2023-02-02 RX ORDER — ALBUMIN HUMAN 50 G/1000ML
SOLUTION INTRAVENOUS CONTINUOUS PRN
Status: DISCONTINUED | OUTPATIENT
Start: 2023-02-02 | End: 2023-02-02

## 2023-02-02 RX ORDER — TRANEXAMIC ACID 100 MG/ML
INJECTION, SOLUTION INTRAVENOUS
Status: DISCONTINUED | OUTPATIENT
Start: 2023-02-02 | End: 2023-02-02

## 2023-02-02 RX ORDER — TRANEXAMIC ACID 100 MG/ML
INJECTION, SOLUTION INTRAVENOUS CONTINUOUS PRN
Status: DISCONTINUED | OUTPATIENT
Start: 2023-02-02 | End: 2023-02-02

## 2023-02-02 RX ORDER — FENTANYL CITRATE 50 UG/ML
INJECTION, SOLUTION INTRAMUSCULAR; INTRAVENOUS
Status: DISCONTINUED | OUTPATIENT
Start: 2023-02-02 | End: 2023-02-02

## 2023-02-02 RX ORDER — ONDANSETRON 2 MG/ML
INJECTION INTRAMUSCULAR; INTRAVENOUS
Status: DISCONTINUED | OUTPATIENT
Start: 2023-02-02 | End: 2023-02-02

## 2023-02-02 RX ORDER — SUCCINYLCHOLINE CHLORIDE 20 MG/ML
INJECTION INTRAMUSCULAR; INTRAVENOUS
Status: DISCONTINUED | OUTPATIENT
Start: 2023-02-02 | End: 2023-02-02

## 2023-02-02 RX ORDER — KETAMINE HCL IN 0.9 % NACL 50 MG/5 ML
SYRINGE (ML) INTRAVENOUS
Status: DISCONTINUED | OUTPATIENT
Start: 2023-02-02 | End: 2023-02-02

## 2023-02-02 RX ORDER — PHENYLEPHRINE HYDROCHLORIDE 10 MG/ML
INJECTION INTRAVENOUS
Status: DISCONTINUED | OUTPATIENT
Start: 2023-02-02 | End: 2023-02-02

## 2023-02-02 RX ORDER — ACETAMINOPHEN 10 MG/ML
INJECTION, SOLUTION INTRAVENOUS
Status: DISCONTINUED | OUTPATIENT
Start: 2023-02-02 | End: 2023-02-02

## 2023-02-02 RX ORDER — LIDOCAINE HYDROCHLORIDE 20 MG/ML
INJECTION INTRAVENOUS
Status: DISCONTINUED | OUTPATIENT
Start: 2023-02-02 | End: 2023-02-02

## 2023-02-02 RX ADMIN — Medication 50 MG: at 08:02

## 2023-02-02 RX ADMIN — MEROPENEM 2 G: 1 INJECTION INTRAVENOUS at 12:02

## 2023-02-02 RX ADMIN — MIDAZOLAM HYDROCHLORIDE 2 MG: 1 INJECTION, SOLUTION INTRAMUSCULAR; INTRAVENOUS at 08:02

## 2023-02-02 RX ADMIN — FENTANYL CITRATE 50 MCG: 50 INJECTION, SOLUTION INTRAMUSCULAR; INTRAVENOUS at 03:02

## 2023-02-02 RX ADMIN — Medication: at 09:02

## 2023-02-02 RX ADMIN — CALCIUM CHLORIDE 300 MG: 100 INJECTION, SOLUTION INTRAVENOUS at 02:02

## 2023-02-02 RX ADMIN — PHENYLEPHRINE HYDROCHLORIDE 100 MCG: 10 INJECTION INTRAVENOUS at 08:02

## 2023-02-02 RX ADMIN — GLYCOPYRROLATE 0.1 MG: 0.2 INJECTION, SOLUTION INTRAMUSCULAR; INTRAVENOUS at 03:02

## 2023-02-02 RX ADMIN — TRANEXAMIC ACID 1 MG/KG/HR: 100 INJECTION, SOLUTION INTRAVENOUS at 10:02

## 2023-02-02 RX ADMIN — DEXMEDETOMIDINE HYDROCHLORIDE 12 MCG: 100 INJECTION, SOLUTION INTRAVENOUS at 10:02

## 2023-02-02 RX ADMIN — PHENYLEPHRINE HYDROCHLORIDE 100 MCG: 10 INJECTION INTRAVENOUS at 09:02

## 2023-02-02 RX ADMIN — TRANEXAMIC ACID 1000 MG: 100 INJECTION, SOLUTION INTRAVENOUS at 10:02

## 2023-02-02 RX ADMIN — SODIUM CHLORIDE, SODIUM GLUCONATE, SODIUM ACETATE, POTASSIUM CHLORIDE, MAGNESIUM CHLORIDE, SODIUM PHOSPHATE, DIBASIC, AND POTASSIUM PHOSPHATE: .53; .5; .37; .037; .03; .012; .00082 INJECTION, SOLUTION INTRAVENOUS at 01:02

## 2023-02-02 RX ADMIN — MEROPENEM 2 G: 1 INJECTION INTRAVENOUS at 11:02

## 2023-02-02 RX ADMIN — DIAZEPAM 5 MG: 5 TABLET ORAL at 09:02

## 2023-02-02 RX ADMIN — CALCIUM CHLORIDE 200 MG: 100 INJECTION, SOLUTION INTRAVENOUS at 02:02

## 2023-02-02 RX ADMIN — Medication 10 MG: at 12:02

## 2023-02-02 RX ADMIN — HYDROMORPHONE HYDROCHLORIDE 2 MG: 1 INJECTION, SOLUTION INTRAMUSCULAR; INTRAVENOUS; SUBCUTANEOUS at 06:02

## 2023-02-02 RX ADMIN — Medication 100 MG: at 10:02

## 2023-02-02 RX ADMIN — SUCCINYLCHOLINE CHLORIDE 180 MG: 20 INJECTION, SOLUTION INTRAMUSCULAR; INTRAVENOUS at 08:02

## 2023-02-02 RX ADMIN — HYDROMORPHONE HYDROCHLORIDE 0.2 MG: 2 INJECTION INTRAMUSCULAR; INTRAVENOUS; SUBCUTANEOUS at 01:02

## 2023-02-02 RX ADMIN — Medication 150 MG: at 08:02

## 2023-02-02 RX ADMIN — PROPOFOL 150 MCG/KG/MIN: 10 INJECTION, EMULSION INTRAVENOUS at 08:02

## 2023-02-02 RX ADMIN — PHENYLEPHRINE HYDROCHLORIDE 200 MCG: 10 INJECTION INTRAVENOUS at 03:02

## 2023-02-02 RX ADMIN — Medication 10 ML: at 05:02

## 2023-02-02 RX ADMIN — MEROPENEM 2 G: 1 INJECTION INTRAVENOUS at 05:02

## 2023-02-02 RX ADMIN — Medication 10 MG: at 10:02

## 2023-02-02 RX ADMIN — POTASSIUM & SODIUM PHOSPHATES POWDER PACK 280-160-250 MG 2 PACKET: 280-160-250 PACK at 06:02

## 2023-02-02 RX ADMIN — REMIFENTANIL HYDROCHLORIDE 0.05 MCG/KG/MIN: 1 INJECTION, POWDER, LYOPHILIZED, FOR SOLUTION INTRAVENOUS at 08:02

## 2023-02-02 RX ADMIN — DEXMEDETOMIDINE HYDROCHLORIDE 12 MCG: 100 INJECTION, SOLUTION INTRAVENOUS at 12:02

## 2023-02-02 RX ADMIN — Medication 50 MG: at 02:02

## 2023-02-02 RX ADMIN — HYDROMORPHONE HYDROCHLORIDE 0.5 MG: 2 INJECTION INTRAMUSCULAR; INTRAVENOUS; SUBCUTANEOUS at 10:02

## 2023-02-02 RX ADMIN — ROCURONIUM BROMIDE 5 MG: 10 INJECTION INTRAVENOUS at 08:02

## 2023-02-02 RX ADMIN — HYDROMORPHONE HYDROCHLORIDE 0.3 MG: 2 INJECTION INTRAMUSCULAR; INTRAVENOUS; SUBCUTANEOUS at 02:02

## 2023-02-02 RX ADMIN — ACETAMINOPHEN 1000 MG: 10 INJECTION INTRAVENOUS at 01:02

## 2023-02-02 RX ADMIN — OXYCODONE HYDROCHLORIDE 10 MG: 10 TABLET ORAL at 05:02

## 2023-02-02 RX ADMIN — DIAZEPAM 5 MG: 5 TABLET ORAL at 05:02

## 2023-02-02 RX ADMIN — SODIUM CHLORIDE, SODIUM GLUCONATE, SODIUM ACETATE, POTASSIUM CHLORIDE, MAGNESIUM CHLORIDE, SODIUM PHOSPHATE, DIBASIC, AND POTASSIUM PHOSPHATE: .53; .5; .37; .037; .03; .012; .00082 INJECTION, SOLUTION INTRAVENOUS at 08:02

## 2023-02-02 RX ADMIN — GABAPENTIN 600 MG: 300 CAPSULE ORAL at 09:02

## 2023-02-02 RX ADMIN — Medication 10 ML: at 12:02

## 2023-02-02 RX ADMIN — ONDANSETRON 4 MG: 2 INJECTION INTRAMUSCULAR; INTRAVENOUS at 09:02

## 2023-02-02 RX ADMIN — MEROPENEM 2 G: 1 INJECTION INTRAVENOUS at 08:02

## 2023-02-02 RX ADMIN — DESMOPRESSIN ACETATE 40 MCG: 4 SOLUTION INTRAVENOUS at 06:02

## 2023-02-02 RX ADMIN — OXYCODONE HYDROCHLORIDE 10 MG: 10 TABLET ORAL at 11:02

## 2023-02-02 RX ADMIN — HALOPERIDOL LACTATE 1 MG: 5 INJECTION, SOLUTION INTRAMUSCULAR at 01:02

## 2023-02-02 RX ADMIN — Medication 30 MG: at 10:02

## 2023-02-02 RX ADMIN — MAGNESIUM SULFATE HEPTAHYDRATE 1 G: 500 INJECTION, SOLUTION INTRAMUSCULAR; INTRAVENOUS at 01:02

## 2023-02-02 RX ADMIN — ALBUMIN (HUMAN): 12.5 SOLUTION INTRAVENOUS at 09:02

## 2023-02-02 RX ADMIN — PHENYLEPHRINE HYDROCHLORIDE 0.4 MCG/KG/MIN: 10 INJECTION INTRAVENOUS at 08:02

## 2023-02-02 RX ADMIN — FENTANYL CITRATE 100 MCG: 50 INJECTION, SOLUTION INTRAMUSCULAR; INTRAVENOUS at 08:02

## 2023-02-02 RX ADMIN — PROPOFOL 50 MCG/KG/MIN: 10 INJECTION, EMULSION INTRAVENOUS at 08:02

## 2023-02-02 RX ADMIN — Medication 10 ML: at 06:02

## 2023-02-02 RX ADMIN — ONDANSETRON 4 MG: 2 INJECTION INTRAMUSCULAR; INTRAVENOUS at 01:02

## 2023-02-02 RX ADMIN — METHOCARBAMOL 1000 MG: 500 TABLET ORAL at 11:02

## 2023-02-02 RX ADMIN — DEXAMETHASONE SODIUM PHOSPHATE 12 MG: 4 INJECTION, SOLUTION INTRAMUSCULAR; INTRAVENOUS at 10:02

## 2023-02-02 RX ADMIN — OXYCODONE HYDROCHLORIDE 10 MG: 10 TABLET ORAL at 09:02

## 2023-02-02 RX ADMIN — Medication 50 MG: at 12:02

## 2023-02-02 RX ADMIN — LIDOCAINE HYDROCHLORIDE 60 MG: 20 INJECTION INTRAVENOUS at 08:02

## 2023-02-02 RX ADMIN — DEXMEDETOMIDINE HYDROCHLORIDE 16 MCG: 100 INJECTION, SOLUTION INTRAVENOUS at 02:02

## 2023-02-02 NOTE — ASSESSMENT & PLAN NOTE
42 year old female w/ chronic HCV cirrhosis, spinal fusion 4/2021, epidural abscess (GBS) with removal of hardware (02/2022), T10-pelvis spinal fusion with hardware (03/2022), obesity,  and neurogenic bladder with recent admission for ESBL E coli bacteremia from urinary source which was treated.then had - increasing weakness to lower extremities, and concerning MRI findings, transferred to [facility]. NSGY consulted, 2 stage revision was planned, she is status post temporary T6-12 Posterior Spinal Instrumented Fusion, operative cultures with E coli and ESBL E coli.  Pending further surgical intervention on 2/1. Blood cultures this admission with no growth. Remains afebrile, thrombocytopenia - s/p platelets transfused and no overt clinical bleeding noted.       Recommendations    - Continue Meropenem. Anticipate plan to de-escalate to ertapenem after next surgical intervention.

## 2023-02-02 NOTE — PROGRESS NOTES
[facility] - Neuro Critical Care  Infectious Disease  Progress Note    Patient Name: Rachel Zazueta  MRN: [id number]  Admission Date: 1/19/2023  Length of Stay: 13 days  Attending Physician: Ace Rowley MD  Primary Care Provider: Dannielle Merino DO    Isolation Status: Contact  Assessment/Plan:      Thoracic discitis  42 year old female w/ chronic HCV cirrhosis, spinal fusion 4/2021, epidural abscess (GBS) with removal of hardware (02/2022), T10-pelvis spinal fusion with hardware (03/2022), obesity,  and neurogenic bladder with recent admission for ESBL E coli bacteremia from urinary source which was treated.then had - increasing weakness to lower extremities, and concerning MRI findings, transferred to [facility]. NSGY consulted, 2 stage revision was planned, she is status post temporary T6-12 Posterior Spinal Instrumented Fusion, operative cultures with E coli and ESBL E coli.  Pending further surgical intervention on 2/1. Blood cultures this admission with no growth. Remains afebrile, thrombocytopenia - s/p platelets transfused and no overt clinical bleeding noted.       Recommendations    - Continue Meropenem. Anticipate plan to de-escalate to ertapenem after next surgical intervention.         Anticipated Disposition: TBD    Thank you for your consult. I will follow-up with patient. Please contact us if you have any additional questions.    Tj Uribe MD  Infectious Disease  [facility] - [facility] Critical Care    Subjective:     Principal Problem:Weakness of both lower extremities    HPI: Ms. Zazueta is a 42-year-old woman known to ID with hx of IVDU, IV drug use (methamphetamine), chronic HCV with cirrhosis, chronic pancytopenia, spinal fusion (04/2021), epidural abscess (GBS) with removal of hardware (02/2022), T10-pelvis spinal fusion with hardware (03/2022), obesity,  and neurogenic bladder who was admitted to [facility] on 12/23/22 with back pain and increasing weakness in the lower  extremities.       Urine and blood cx from that admission + for ESBL E coli which was treated with ertapenem.  Repeat blood cx on 12/28 and 1/10 negative.   Patient started on physical therapy but weakness in the lower extremities increased, and the patient is no longer able to walk. She is able to urinate intermittently and defecate.      An MRI cervical spine 1/10 was significant  for multilevel spondylosis.  MRI brain with nonspecific findings.  Xray of spine notable for focal kyphosis at T9-10 increased when compared to prior scoliosis radiographs.  An MRI of the spine was not available due to the patient's size. and she was transferred to [facility] for advanced imaging and Neurosurgery evaluation.      MRI  imaging here  on 1/20 concerning for worsening kyphosis, extensive edema in the paraspinal muscle. CT with spondylodiscitis T8-9, T9-10    Neurosurgery planning hardware removal and washout on 1/24    Of note, review of records show admission to [facility] 11/14/2022 to 11/22/2022 with pyogenic arthritis of the left shoulder, s/p I&D on 11/15 with cx positive for E Coli.  Utox was positive for methadone, opiates, amphetamines, and THC.  Seen by ID at [facility] who recommend IV ceftriaxone X 6 weeks at LTAC, but patient was not agreeable to this.  Offered  daily ceftriaxone infusions at infusion center.  At discharge from there she had received 8 days of ceftriaxone. Review of notes show she was subsequently prescribed 4 weeks of Bactrim 2 DS tabs twice daily.       Interval History: reports pain is well controlled, no new symptoms, denies bleeding, fevers, rigors.     Review of Systems   Constitutional:  Positive for activity change and fatigue. Negative for appetite change and fever.   HENT:  Negative for trouble swallowing.    Respiratory:  Negative for shortness of breath.    Cardiovascular:  Negative for chest pain.   Gastrointestinal:  Negative for abdominal pain.   Musculoskeletal:   Positive for arthralgias and back pain.   Skin:  Positive for wound.   Neurological:  Negative for speech difficulty.   Psychiatric/Behavioral:  Negative for behavioral problems and confusion.        Objective:     Vital Signs (Most Recent):  Temp: 98.5 °F (36.9 °C) (02/01/23 1752)  Pulse: 107 (02/01/23 1752)  Resp: (!) 22 (02/01/23 1804)  BP: 119/60 (02/01/23 1752)  SpO2: 95 % (02/01/23 1752)   Vital Signs (24h Range):  Temp:  [97.8 °F (36.6 °C)-98.7 °F (37.1 °C)] 98.5 °F (36.9 °C)  Pulse:  [] 107  Resp:  [12-39] 22  SpO2:  [94 %-100 %] 95 %  BP: ()/(47-69) 119/60     Weight: 129.5 kg (285 lb 7.9 oz)  Body mass index is 44.71 kg/m².    Estimated Creatinine Clearance: 205.5 mL/min (based on SCr of 0.5 mg/dL).    Physical Exam  Constitutional:       Appearance: She is obese. She is not ill-appearing or toxic-appearing.   HENT:      Head: Normocephalic and atraumatic.      Nose: Nose normal.      Mouth/Throat:      Mouth: Mucous membranes are moist.      Pharynx: Oropharynx is clear.   Eyes:      Extraocular Movements: Extraocular movements intact.      Conjunctiva/sclera: Conjunctivae normal.      Pupils: Pupils are equal, round, and reactive to light.   Cardiovascular:      Rate and Rhythm: Normal rate and regular rhythm.      Pulses: Normal pulses.      Heart sounds: Normal heart sounds.   Pulmonary:      Effort: Pulmonary effort is normal.      Breath sounds: Normal breath sounds.   Abdominal:      General: Bowel sounds are normal.      Palpations: Abdomen is soft.   Musculoskeletal:         General: No swelling, tenderness or deformity.      Cervical back: Normal range of motion and neck supple.      Comments: Surgical drains x 2 present with serosanguinous drainage   Skin:     General: Skin is warm and dry.      Comments: LUE picc line c/d/I  RUE midline c/d/i   Neurological:      Mental Status: She is oriented to person, place, and time. Mental status is at baseline.       Significant Labs: BMP:    Recent Labs   Lab 02/01/23 0129         K 4.4      CO2 29   BUN 18   CREATININE 0.5   CALCIUM 7.6*   MG 1.8     CBC:   Recent Labs   Lab 01/31/23  0209 02/01/23 0129 02/01/23  1446   WBC 2.81* 2.45* 2.33*   HGB 7.3* 7.1* 6.9*   HCT 24.6* 23.5* 22.4*   PLT 76* 67* 82*     CMP:   Recent Labs   Lab 01/31/23 0209 02/01/23 0129   * 136   K 4.3 4.4    104   CO2 29 29    100   BUN 16 18   CREATININE 0.4* 0.5   CALCIUM 8.0* 7.6*   PROT 4.9* 4.8*   ALBUMIN 1.7* 1.7*   BILITOT 1.1* 1.1*   ALKPHOS 108 107   AST 54* 50*   ALT 32 29   ANIONGAP 2* 3*     Microbiology Results (last 7 days)       Procedure Component Value Units Date/Time    Blood culture [[id number]] Collected: 01/27/23 0120    Order Status: Completed Specimen: Blood from Peripheral, Forearm, Right Updated: 02/01/23 0612     Blood Culture, Routine No growth after 5 days.    Blood culture [[id number]] Collected: 01/27/23 0123    Order Status: Completed Specimen: Blood from Peripheral, Antecubital, Left Updated: 02/01/23 0612     Blood Culture, Routine No growth after 5 days.    Culture, Anaerobe [[id number]] Collected: 01/24/23 0943    Order Status: Completed Specimen: Bone from Back Updated: 01/30/23 0943     Anaerobic Culture No anaerobes isolated    Narrative:      4) Perispinal    Culture, Anaerobe [[id number]] Collected: 01/24/23 0943    Order Status: Completed Specimen: Bone from Back Updated: 01/30/23 0942     Anaerobic Culture No anaerobes isolated    Narrative:      3) Perispinal    Culture, Anaerobe [[id number]] Collected: 01/24/23 0913    Order Status: Completed Specimen: Body Fluid from Back Updated: 01/30/23 0942     Anaerobic Culture No anaerobes isolated    Narrative:      2) Perispinal    Culture, Anaerobe [[id number]] Collected: 01/24/23 0913    Order Status: Completed Specimen: Body Fluid from Back Updated: 01/30/23 0859     Anaerobic Culture No anaerobes isolated    Narrative:      1) Perispinal    Aerobic  culture [[id number]]  (Abnormal)  (Susceptibility) Collected: 01/24/23 0943    Order Status: Completed Specimen: Bone from Back Updated: 01/28/23 1145     Aerobic Bacterial Culture ESCHERICHIA COLI ESBL  From broth only      Narrative:      4) Perispinal    Aerobic culture [[id number]]  (Abnormal)  (Susceptibility) Collected: 01/24/23 0914    Order Status: Completed Specimen: Wound from Back Updated: 01/26/23 1306     Aerobic Bacterial Culture ESCHERICHIA COLI  Rare      Narrative:      1) Perispinal    Aerobic culture [[id number]]  (Abnormal)  (Susceptibility) Collected: 01/24/23 0914    Order Status: Completed Specimen: Wound from Back Updated: 01/26/23 1306     Aerobic Bacterial Culture ESCHERICHIA COLI ESBL  Few      Narrative:      2) Perispinal    Aerobic culture [[id number]]  (Abnormal)  (Susceptibility) Collected: 01/24/23 0943    Order Status: Completed Specimen: Bone from Back Updated: 01/26/23 1252     Aerobic Bacterial Culture ESCHERICHIA COLI ESBL  Few      Narrative:      3) Perispinal    AFB Culture & Smear [[id number]] Collected: 01/24/23 0943    Order Status: Completed Specimen: Bone from Back Updated: 01/25/23 2127     AFB Culture & Smear Culture in progress     AFB CULTURE STAIN No acid fast bacilli seen.    Narrative:      4) Perispinal    AFB Culture & Smear [[id number]] Collected: 01/24/23 0943    Order Status: Completed Specimen: Bone from Back Updated: 01/25/23 2127     AFB Culture & Smear Culture in progress     AFB CULTURE STAIN No acid fast bacilli seen.    Narrative:      3) Perispinal    AFB Culture & Smear [[id number]] Collected: 01/24/23 0913    Order Status: Completed Specimen: Body Fluid from Back Updated: 01/25/23 2127     AFB Culture & Smear Culture in progress     AFB CULTURE STAIN No acid fast bacilli seen.    Narrative:      1) Perispinal    AFB Culture & Smear [[id number]] Collected: 01/24/23 0913    Order Status: Completed Specimen: Body Fluid from Back Updated: 01/25/23 2127     AFB  Culture & Smear Culture in progress     AFB CULTURE STAIN No acid fast bacilli seen.    Narrative:      2) Perispinal          Recent Lab Results         02/01/23  1446   02/01/23  1113   02/01/23  0925   02/01/23  0129        Unit Blood Type Code     6200              6200  [P]              6200  [P]              0600  [P]         Unit Expiration     [id number]  [P]              [id number]  [P]              [id number]  [P]         Unit Blood Type     A POS              A POS  [P]              A POS  [P]              A NEG  [P]         Albumin       1.7       Alkaline Phosphatase       107       ALT       29       Anion Gap       3       AST       50       Baso # 0.01       0.02       Basophil % 0.4       0.8       BILIRUBIN TOTAL       1.1  Comment: For infants and newborns, interpretation of results should be based  on gestational age, weight and in agreement with clinical  observations.    Premature Infant recommended reference ranges:  Up to 24 hours.............<8.0 mg/dL  Up to 48 hours............<12.0 mg/dL  3-5 days..................<15.0 mg/dL  6-29 days.................<15.0 mg/dL         BUN       18       Calcium       7.6       Chloride       104       CO2       29       CODING SYSTEM     [id number]              [id number]  [P]              [id number]  [P]              [id number]  [P]         Creatinine       0.5       Differential Method Automated       Automated       DISPENSE STATUS     RETURNED              ISSUED  [P]              ISSUED  [P]              ISSUED  [P]         eGFR       >60.0       Eos # 0.1       0.1       Eosinophil % 4.3       4.5       Glucose       100       Gran # (ANC) 1.5       1.6       Gran % 65.7       64.1       Group & Rh     A POS         Hematocrit 22.4       23.5       Hemoglobin 6.9       7.1       Immature Grans (Abs) 0.00  Comment: Mild elevation in immature granulocytes is non specific and   can be seen in a variety of conditions including  stress response,   acute inflammation, trauma and pregnancy. Correlation with other   laboratory and clinical findings is essential.         0.01  Comment: Mild elevation in immature granulocytes is non specific and   can be seen in a variety of conditions including stress response,   acute inflammation, trauma and pregnancy. Correlation with other   laboratory and clinical findings is essential.         Immature Granulocytes 0.0       0.4       INDIRECT ESSENCE     NEG         INR   1.2  Comment: Coumadin Therapy:  2.0 - 3.0 for INR for all indicators except mechanical heart valves  and antiphospholipid syndromes which should use 2.5 - 3.5.             Lymph # 0.4       0.5       Lymph % 16.7       18.8       Magnesium       1.8       MCH 29.0       28.4       MCHC 30.8       30.2       MCV 94       94       Mono # 0.3       0.3       Mono % 12.9       11.4       MPV 10.7       9.0       nRBC 0       0       Phosphorus       2.9       Platelets 82       67       Potassium       4.4       Product Code     [id number]              [id number]  [P]              [id number]  [P]              [id number]  [P]         PROTEIN TOTAL       4.8       Protime   12.7           RBC 2.38       2.50       RDW 19.2       19.6       Sodium       136       UNIT NUMBER     [id number]              [id number]  [P]              [id number]  [P]              [id number]  [P]         WBC 2.33       2.45                [P] - Preliminary Result               Significant Imaging: I have reviewed all pertinent imaging results/findings within the past 24 hours.

## 2023-02-02 NOTE — ASSESSMENT & PLAN NOTE
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 Transferred to [facility] to  and neurosurgery was consulted    OR yesterday for temporary T6-12 PSIF. Taz pus noted intraoperatively.   Given intraoperative findings, new operative plan for T4-pelvis revision and extension of fusion with T9-10 corpectomy with plastic surgery assistance planned for 2/2/23    Plan:  --Admitted to ICU, Keep in ICU on logroll precautions until stage 2 Thursday.    -- MRI C/T/L spine 1/20 with C7 SP enhancement, focal kyphosis at T8-10 with severe anterior height loss at T9, enhancing C6 inferior endplate Schmorl's node  -- CT T/L spine thin cut 1/20 with haloing of pelvic and T10 screws, spondylodiscitis T8-9, T9-10  -- CT C spine with concern for discitis at C6-7  -- flex ext XR done, limited view of C6-7 in swimmers view 2/2 shoulders  -- 1/20 ESR 70, CRP 9.4. elevated from prior   -- TLSO at bedside  --HV x2 R to gravity, L FS  --ID: merropenam   -- BCx 1/20: NGTD   -- OR cultures 1/24: E. coli  --OR 2/2/23 as above   -- NPO 2/1/23 midnight   -- platelets > 100k   -- Hb 7.3, Plts 90. Given 2u platelets yesterday, 2 more this am + ddavp  -- multimodal pain control per primary medicine team  --NSGY will continue to follow. Please contact team with any further questions.

## 2023-02-02 NOTE — ANESTHESIA PROCEDURE NOTES
Intubation    Date/Time: 2/2/2023 8:45 AM  Performed by: Nuha Pinedo CRNA  Authorized by: Nuha Pinedo CRNA     Intubation:     Induction:  Intravenous    Intubated:  Postinduction    Mask Ventilation:  Easy mask    Attempts:  1    Attempted By:  CRNA    Method of Intubation:  Video laryngoscopy    Blade:  Su 3    Laryngeal View Grade: Grade I - full view of cords      Difficult Airway Encountered?: No      Complications:  None    Airway Device:  Oral endotracheal tube    Airway Device Size:  7.0    Style/Cuff Inflation:  Cuffed (inflated to minimal occlusive pressure)    Tube secured:  21    Secured at:  The lips    Placement Verified By:  Capnometry    Complicating Factors:  None    Findings Post-Intubation:  Atraumatic/condition of teeth unchanged and BS equal bilateral

## 2023-02-02 NOTE — PLAN OF CARE
[facility] Care Plan    POC reviewed with Rachel Zazueta and family at 1400. Pt verbalized understanding. Questions and concerns addressed. No acute events today. Pt progressing toward goals. Will continue to monitor. See below and flowsheets for full assessment and VS info.     -OR tmr morning @ 7am. CHG bath complete, T&S current and all consents in chart.  -2u PLTs given and 1u RBCs infusing, blood bank to send additional 1u PLTs this evening.   -NPO at 0000  -R HVD to gravity output 40cc  -L HVD to full sx output 10cc  -woundvac in place  -PCA pump and PRN oxycodone for pain mgmt  -PRN valium given for muscle spasm    Is this a stroke patient? no    Neuro:  [place] Coma Scale  Best Eye Response: 4-->(E4) spontaneous  Best Motor Response: 6-->(M6) obeys commands  Best Verbal Response: 5-->(V5) oriented  M[place] Coma Scale Score: 15  Assessment Qualifiers: patient not sedated/intubated  Pupil PERRLA: yes     24 hr Temp:  [97.8 °F (36.6 °C)-98.7 °F (37.1 °C)]     CV:   Rhythm: sinus tachycardia  BP goals:   SBP < 140  MAP > 65    Resp:      Oxygen Concentration (%): 24    Plan: N/A    GI/:     Diet/Nutrition Received: regular  Last Bowel Movement: 02/01/23  Voiding Characteristics: external catheter    Intake/Output Summary (Last 24 hours) at 2/1/2023 1842  Last data filed at 2/1/2023 1805  Gross per 24 hour   Intake 1095.94 ml   Output 1120 ml   Net -24.06 ml     Unmeasured Output  Urine Occurrence: 1  Stool Occurrence: 1  Pad Count: 1    Labs/Accuchecks:  Recent Labs   Lab 02/01/23  1446   WBC 2.33*   RBC 2.38*   HGB 6.9*   HCT 22.4*   PLT 82*      Recent Labs   Lab 02/01/23  0129      K 4.4   CO2 29      BUN 18   CREATININE 0.5   ALKPHOS 107   ALT 29   AST 50*   BILITOT 1.1*      Recent Labs   Lab 02/01/23  1113   INR 1.2    No results for input(s): CPK, CPKMB, TROPONINI, MB in the last 168 hours.    Electrolytes: N/A - electrolytes WDL  Accuchecks: none    Gtts:   hydromorphone in 0.9 % NaCl 6 mg/30  ml         LDA/Wounds:  Lines/Drains/Airways       Peripherally Inserted Central Catheter Line  Duration             PICC Double Lumen 01/26/23 1209 left basilic 6 days              Drain  Duration                  Closed/Suction Drain 01/24/23 Posterior Back Accordion 10 Fr. 8 days         Closed/Suction Drain 01/24/23 Posterior;Right Back Accordion 10 Fr. 8 days    Female External Urinary Catheter 01/27/23 1115 5 days              Peripheral Intravenous Line  Duration                  Midline Catheter Insertion/Assessment  - Single Lumen 01/12/23 2138 Right basilic vein (medial side of arm) 18g x 10cm 19 days                  Wounds: Yes  Wound care consulted: Yes

## 2023-02-02 NOTE — TRANSFER OF CARE
"Anesthesia Transfer of Care Note    Patient: Rachel Zazueta    Procedure(s) Performed: Procedure(s) (LRB):  LAMINECTOMY, SPINE, THORACIC, WITH FUSION (T4-Pelvis fusion w/ T9-10 corpectomies) **AIRO (N/A)    Patient location: ICU (Neuro ICU 9598)    Anesthesia Type: general    Transport from OR: Transported from OR on 6-10 L/min O2 by face mask with adequate spontaneous ventilation    Post pain: adequate analgesia    Post assessment: no apparent anesthetic complications    Post vital signs: stable    Level of consciousness: responds to stimulation and sedated    Nausea/Vomiting: no nausea/vomiting    Complications: none    Transfer of care protocol was followed      Last vitals:   Visit Vitals  /62   Pulse 98   Temp (!) 34.4 °C (94 °F) (Axillary)   Resp (!) 21   Ht 5' 7" (1.702 m)   Wt 129.5 kg (285 lb 7.9 oz)   LMP 01/23/2023   SpO2 98%   Breastfeeding No   BMI 44.71 kg/m²     "

## 2023-02-02 NOTE — PROGRESS NOTES
Patient arrived back to room 9098 from OR s/p laminectomy with fusion (T4-Pelvis fusion w/ T9-10 corpectomy). Report received from ROBERT Breen. RN to resume care. Family at bedside updated per MD Eliseo (NSGY). KADEN Syed with [facility] updated on patient's arrival.

## 2023-02-02 NOTE — SUBJECTIVE & OBJECTIVE
Interval History:  see hospital course    Review of Systems  Unable to obtain a complete ROS due to level of consciousness.  Objective:     Vitals:  Temp: (!) 94 °F (34.4 °C)  Pulse: 95  Rhythm: sinus tachycardia  BP: (!) 101/56  MAP (mmHg): 75  Resp: (!) 24  SpO2: 100 %    Temp  Min: 94 °F (34.4 °C)  Max: 98.2 °F (36.8 °C)  Pulse  Min: 94  Max: 119  BP  Min: 87/50  Max: 116/78  MAP (mmHg)  Min: 65  Max: 81  Resp  Min: 17  Max: 46  SpO2  Min: 89 %  Max: 100 %    02/01 0701 - 02/02 0700  In: 2156.1 [P.O.:350; I.V.:85]  Out: 1990 [Urine:1350; Drains:640]   Unmeasured Output  Urine Occurrence: 1  Stool Occurrence: 0  Pad Count: 1       Physical Exam  Physical Exam:  GA: lethargic, comfortable, no acute distress.   HEENT: No scleral icterus  Pulmonary: normal effort, oral airway in place  Cardiac: regular rate  Abdominal: non-tender  Skin: No grossly noticeable rashes    Neuro:  --sedation: post op anesthesia  --GCS: E2V2M5  --Mental Status:heavily sedated post operatively    --CN II-XII grossly intact as best as can be examined, L disconjugate gaze noted previously  --Pupils 3mm, PERRL.   --brainstem: intact as best as can be observed  --Motor: minimal wd throughout but heavily sedated  --Deep tendon Reflexes: not tested  --Gait: deferred      Medications:  Continuoushydromorphone in 0.9 % NaCl 6 mg/30 ml, Last Rate: Stopped (02/02/23 0828)    Scheduledacetaminophen, 1,000 mg, TID  buPROPion, 300 mg, Daily  gabapentin, 600 mg, TID  lactulose, 20 g, TID  meropenem (MERREM) IVPB, 2 g, Q8H  methocarbamoL, 1,000 mg, QID  pantoprazole, 40 mg, Daily  polyethylene glycol, 17 g, BID  senna-docusate 8.6-50 mg, 2 tablet, BID  sodium chloride 0.9%, 10 mL, Q6H    PRNsodium chloride, , Q24H PRN  sodium chloride, , Q24H PRN  sodium chloride, , Q24H PRN  sodium chloride, , Q24H PRN  sodium chloride, , Q24H PRN  sodium chloride, , Q24H PRN  sodium chloride, , Q24H PRN  dextrose 10%, 12.5 g, PRN  dextrose 10%, 25 g, PRN  diazePAM, 5  mg, Q6H PRN  glucagon (human recombinant), 1 mg, PRN  glucose, 16 g, PRN  glucose, 24 g, PRN  hydrALAZINE, 10 mg, Q6H PRN  labetalol, 10 mg, Q4H PRN  magnesium hydroxide 400 mg/5 ml, 30 mL, Daily PRN  magnesium oxide, 800 mg, PRN  magnesium oxide, 800 mg, PRN  melatonin, 6 mg, Nightly PRN  naloxone, 0.02 mg, PRN  ondansetron, 4 mg, Q6H PRN  oxyCODONE, 10 mg, Q4H PRN  polyethylene glycol, 17 g, Daily PRN  potassium bicarbonate, 35 mEq, PRN  potassium bicarbonate, 50 mEq, PRN  potassium bicarbonate, 60 mEq, PRN  potassium, sodium phosphates, 2 packet, PRN  potassium, sodium phosphates, 2 packet, PRN  potassium, sodium phosphates, 2 packet, PRN  prochlorperazine, 2.5 mg, Q6H PRN  sodium chloride 0.9%, 10 mL, Q12H PRN  sodium chloride 0.9%, 10 mL, PRN        Diet  Diet NPO Except for: Medication, Sips with Medication  Diet NPO Except for: Medication, Sips with Medication

## 2023-02-02 NOTE — PROGRESS NOTES
Patient transported to OR with anesthesia team x 2. PCA pump stopped per MD. Meropenum infusing. Plt unit x1 completed. Portable monitor, O2 tank, wound vac, ambu bag, and paper chart with consents present.

## 2023-02-02 NOTE — ANESTHESIA PROCEDURE NOTES
Arterial    Diagnosis: spine surgery    Patient location during procedure: done in OR    Staffing  Authorizing Provider: Jaylon Steve MD  Performing Provider: Jaylon Steve MD    Anesthesiologist was present at the time of the procedure.    Preanesthetic Checklist  Completed: patient identified, IV checked, site marked, risks and benefits discussed, surgical consent, monitors and equipment checked, pre-op evaluation, timeout performed and anesthesia consent givenArterial  Skin Prep: chlorhexidine gluconate and isopropyl alcohol  Orientation: right  Location: radial    Catheter Size: 20 G  Catheter placement by Anatomical landmarks. Heme positive aspiration all ports. Insertion Attempts: 2

## 2023-02-02 NOTE — PROGRESS NOTES
[facility]  Neurosurgery  Progress Note    Subjective:     History of Present Illness: Ms. Zazueta is a 42 y.o F w/ hx ofIV drug use (methamphetamine), chronic HCV with cirrhosis, spinal fusion (04/2021), epidural abscess with removal of hardware (02/2022), spinal fusion with hardware (T10 - Pelvis / 03/2022), obesity (BMI 39.3) and neurogenic bladder and recent shoulder surgery who initially presented to [facility] for evaluation of back pain and left leg weakness.  She reports initially noting the left leg weakness when she was about to go to the bathroom and was about to fall but was assisted by her boyfriend.  She was brought to the ED via EMS.  Urinalysis with UTI concerns and blood cultures at OSH grew ESBL E coli, and shoulder effusion concern for infection so she was treated with meropenem.  During hospitalization, she reports the weakness that started out in her left leg initially then spread upwards to her left thigh, left hip, then crossed over to the right hip and spread to her right leg.  Denies recent IV drug use. She reports her last incidence of drug use was more than 3 years ago and also denies tobacco, and alcohol abuse.  Reports cannabis use.     OSH course notable for concerning urinalysis and blood cultures positive for ESBL E coli treated with meropenem.  She was also admit to the ICU where she was treated with Precedex for significant body aches, irritability, and muscle spasms.  Concerns of a murmur on physical exam so she underwent TTE which did not show any vegetations.  Worsening lower extremity weakness workup with MRI head nonspecific, MRI cervical spine with multilevel spondylosis, X-ray spine with multilevel thoracolumbar fusion and diskectomy, focal kyphosis at T9-10 increased compared to prior.  She was started on prophylaxis amoxicillin due to her history of infected hardware.  MRI spine unable to be completed due to patient's body habitus so she was  transferred to [facility] [place] for further imaging and Neurosurgery, Neurology evaluation.      Post-Op Info:  Procedure(s) (LRB):  LAMINECTOMY, SPINE, THORACIC, WITH FUSION (T4-Pelvis fusion w/ T9-10 corpectomies) **AIRO (N/A)   Day of Surgery     Interval History: 2/2: Or today. 2 more packs platelets given today and ddavp. 2/1: NAEON, AFVSS, drain ouput 50 and 10. Hb 7.1, Plt 67 today. Needs platelet transfusion for ptl goal > 100 prior to OR    Medications:  Continuous Infusions:   hydromorphone in 0.9 % NaCl 6 mg/30 ml Stopped (02/02/23 0828)     Scheduled Meds:   acetaminophen  1,000 mg Oral TID    buPROPion  300 mg Oral Daily    gabapentin  600 mg Oral TID    lactulose  20 g Oral TID    meropenem (MERREM) IVPB  2 g Intravenous Q8H    methocarbamoL  1,000 mg Oral QID    pantoprazole  40 mg Oral Daily    polyethylene glycol  17 g Oral BID    senna-docusate 8.6-50 mg  2 tablet Oral BID    sodium chloride 0.9%  10 mL Intravenous Q6H     PRN Meds:sodium chloride, sodium chloride, sodium chloride, sodium chloride, sodium chloride, sodium chloride, sodium chloride, dextrose 10%, dextrose 10%, diazePAM, glucagon (human recombinant), glucose, glucose, hydrALAZINE, labetalol, magnesium hydroxide 400 mg/5 ml, magnesium oxide, magnesium oxide, melatonin, naloxone, ondansetron, oxyCODONE, polyethylene glycol, potassium bicarbonate, potassium bicarbonate, potassium bicarbonate, potassium, sodium phosphates, potassium, sodium phosphates, potassium, sodium phosphates, prochlorperazine, sodium chloride 0.9%, Flushing PICC Protocol **AND** sodium chloride 0.9% **AND** sodium chloride 0.9%     Review of Systems  Objective:     Weight: 129.5 kg (285 lb 7.9 oz)  Body mass index is 44.71 kg/m².  Vital Signs (Most Recent):  Temp: 98.2 °F (36.8 °C) (02/02/23 0820)  Pulse: (!) 117 (02/02/23 0820)  Resp: (!) 24 (02/02/23 0828)  BP: (!) 90/52 (02/02/23 0820)  SpO2: (!) 94 % (02/02/23 0820) Vital Signs (24h Range):  Temp:  [97.8 °F (36.6  °C)-98.5 °F (36.9 °C)] 98.2 °F (36.8 °C)  Pulse:  [106-119] 117  Resp:  [15-46] 24  SpO2:  [89 %-100 %] 94 %  BP: ()/(47-78) 90/52     Date 02/02/23 0700 - 02/03/23 0659   Shift 0991-3333 6637-6698 2077-2821 24 Hour Total   INTAKE   Blood 446   446   IV Piggyback 48.9   48.9   Shift Total(mL/kg) 494.9(3.8)   494.9(3.8)   OUTPUT   Shift Total(mL/kg)       Weight (kg) 129.5 129.5 129.5 129.5                            Neurosurgery Physical Exam  General: well developed, well nourished, no distress.   Head: normocephalic, atraumatic  Neurologic: Alert and oriented. Thought content appropriate.  GCS: Motor: 6/Verbal: 5/Eyes: 4 GCS Total: 15  Mental Status: Awake, Alert, Oriented x 4  Language: No aphasia  Speech: No dysarthria  Cranial nerves: face symmetric, tongue midline, CN II-XII grossly intact.   Eyes: pupils equal, round, reactive to light with accommodation, EOMI, nystagmus.   Pulmonary: normal respirations, no signs of respiratory distress  Abdomen: soft, non-distended, not tender to palpation  Skin: Skin is warm, dry and intact.  Sensory: intact to light touch throughout     Motor Strength:Moves all extremities spontaneously with good tone.  Full strength upper and extremities. No abnormal movements seen.      Strength   Deltoids Triceps Biceps Wrist Extension Wrist Flexion Hand    Upper: R 5/5 5/5 5/5 5/5 5/5 5/5     L 5/5 5/5 5/5 5/5 5/5 5/5       Iliopsoas Quadriceps Knee  Flexion Tibialis  anterior Gastro- cnemius EHL   Lower: R 3/5 4+/5 4+/5 4/5 4-/5 4/5     L 3/5 4+/5 4+/5 4/5 4-/5 4/5      Reflexes:   DTR: 2+ symmetrically throughout.  Parker's: Negative.  Babinski's: Present bilaterally  Clonus: 2 beats bilateral lower extremity     Incision well healed       Significant Labs:  Recent Labs   Lab 02/01/23  0129 02/02/23  0346    101    136   K 4.4 4.3    102   CO2 29 27   BUN 18 16   CREATININE 0.5 0.5   CALCIUM 7.6* 8.0*   MG 1.8 1.8       Recent Labs   Lab 02/01/23  1440  02/02/23  0036 02/02/23  0346   WBC 2.33* 2.29* 3.21*   HGB 6.9* 7.4* 7.3*   HCT 22.4* 24.7* 23.8*   PLT 82* 83* 90*       Recent Labs   Lab 02/01/23  1113   INR 1.2       Microbiology Results (last 7 days)       Procedure Component Value Units Date/Time    Blood culture [[id number]] Collected: 01/27/23 0120    Order Status: Completed Specimen: Blood from Peripheral, Forearm, Right Updated: 02/01/23 0612     Blood Culture, Routine No growth after 5 days.    Blood culture [[id number]] Collected: 01/27/23 0123    Order Status: Completed Specimen: Blood from Peripheral, Antecubital, Left Updated: 02/01/23 0612     Blood Culture, Routine No growth after 5 days.    Culture, Anaerobe [[id number]] Collected: 01/24/23 0943    Order Status: Completed Specimen: Bone from Back Updated: 01/30/23 0943     Anaerobic Culture No anaerobes isolated    Narrative:      4) Perispinal    Culture, Anaerobe [[id number]] Collected: 01/24/23 0943    Order Status: Completed Specimen: Bone from Back Updated: 01/30/23 0942     Anaerobic Culture No anaerobes isolated    Narrative:      3) Perispinal    Culture, Anaerobe [[id number]] Collected: 01/24/23 0913    Order Status: Completed Specimen: Body Fluid from Back Updated: 01/30/23 0942     Anaerobic Culture No anaerobes isolated    Narrative:      2) Perispinal    Culture, Anaerobe [[id number]] Collected: 01/24/23 0913    Order Status: Completed Specimen: Body Fluid from Back Updated: 01/30/23 0859     Anaerobic Culture No anaerobes isolated    Narrative:      1) Perispinal    Aerobic culture [[id number]]  (Abnormal)  (Susceptibility) Collected: 01/24/23 0943    Order Status: Completed Specimen: Bone from Back Updated: 01/28/23 1145     Aerobic Bacterial Culture ESCHERICHIA COLI ESBL  From broth only      Narrative:      4) Perispinal    Aerobic culture [[id number]]  (Abnormal)  (Susceptibility) Collected: 01/24/23 0914    Order Status: Completed Specimen: Wound from Back Updated: 01/26/23 1306      Aerobic Bacterial Culture ESCHERICHIA COLI  Rare      Narrative:      1) Perispinal    Aerobic culture [[id number]]  (Abnormal)  (Susceptibility) Collected: 01/24/23 0914    Order Status: Completed Specimen: Wound from Back Updated: 01/26/23 1306     Aerobic Bacterial Culture ESCHERICHIA COLI ESBL  Few      Narrative:      2) Perispinal    Aerobic culture [[id number]]  (Abnormal)  (Susceptibility) Collected: 01/24/23 0943    Order Status: Completed Specimen: Bone from Back Updated: 01/26/23 1252     Aerobic Bacterial Culture ESCHERICHIA COLI ESBL  Few      Narrative:      3) Perispinal          All pertinent labs from the last 24 hours have been reviewed.    Significant Diagnostics:  CT C-spine:  Vertebrae: The dens, lateral masses, and occipital condyles are intact.  There is no evidence of fracture or dislocation.     Discs: Multilevel disc height loss and degenerative endplate change.  Prominent C6 inferior endplate Schmorl's node, similar in appearance dating back to MRI from 02/14/2022.     Degenerative changes: Sent spinal canal dimensions at C3-C4 are 10.5 mm, 8.5 mm at C4-C5, 10.0 mm at C5-C6, and 10.8 mm at C6-C7 .  Multilevel uncovertebral spurring with multilevel neural foraminal narrowing most pronounced at C5-C6 with moderate right neural foraminal narrowing and C6-C7 with moderate left neural foraminal narrowing.    Physical Exam  Neurosurgery Physical Exam    Assessment/Plan:     * Weakness of both lower extremities  Pt is 42F multiple spinal surgeries and revisions last T10- Pelvis in March 2022 who presented to OSH with concern for shoulder infection and UTI found to have E coli sepsis who has had progressive worsening BLE weakness, XR showed possible worsening proximal junctional kyphotic deformity. [place] [facility] [place]    OR yesterday for temporary T6-12 PSIF. Taz pus noted intraoperatively.   Given intraoperative findings, new operative plan for T4-pelvis  revision and extension of fusion with T9-10 corpectomy with plastic surgery assistance planned for 2/2/23    Plan:  --Admitted to ICU, Keep in ICU on logroll precautions until stage 2 Thursday.    -- MRI C/T/L spine 1/20 with C7 SP enhancement, focal kyphosis at T8-10 with severe anterior height loss at T9, enhancing C6 inferior endplate Schmorl's node  -- CT T/L spine thin cut 1/20 with haloing of pelvic and T10 screws, spondylodiscitis T8-9, T9-10  -- CT C spine with concern for discitis at C6-7  -- flex ext XR done, limited view of C6-7 in swimmers view 2/2 shoulders  -- 1/20 ESR 70, CRP 9.4. elevated from prior   -- TLSO at bedside  --HV x2 R to gravity, L FS  --ID: merropenam   -- BCx 1/20: NGTD   -- OR cultures 1/24: E. coli  --OR 2/2/23 as above   -- NPO 2/1/23 midnight   -- platelets > 100k   -- Hb 7.3, Plts 90. Given 2u platelets yesterday, 2 more this am + ddavp  -- multimodal pain control per primary medicine team  --NSGY will continue to follow. Please contact team with any further questions.        Emily Hercules MD  Neurosurgery  [facility]

## 2023-02-02 NOTE — PLAN OF CARE
[facility]  Discharge Reassessment    Primary Care Provider: Dannielle Merino DO    Expected Discharge Date: 2/8/2023    Per MD, patient S/p Procedure(s) (LRB):  LAMINECTOMY, SPINE, THORACIC, WITH FUSION (T4-Pelvis fusion w/ T9-10 corpectomies) **AIRO (N/A)  POD 0    Patient not medically ready for discharge.     Reassessment (most recent)       Discharge Reassessment - 02/02/23 1631          Discharge Reassessment    Assessment Type Discharge Planning Reassessment     Did the patient's condition or plan change since previous assessment? No     Communicated SHIRA with patient/caregiver Date not available/Unable to determine     Discharge Plan A Long-term acute care facility (LTAC)     Discharge Plan B Rehab     DME Needed Upon Discharge  other (see comments)   tbd    Discharge Barriers Identified Underinsured     Why the patient remains in the hospital Requires continued medical care                   Ernestina Elizabeth RN, CCRN-K, [facility]  Neuro-Critical Care   X [phone]

## 2023-02-02 NOTE — SUBJECTIVE & OBJECTIVE
Interval History: 2/2: Or today. 2 more packs platelets given today and ddavp. 2/1: NAEON, AFVSS, drain ouput 50 and 10. Hb 7.1, Plt 67 today. Needs platelet transfusion for ptl goal > 100 prior to OR    Medications:  Continuous Infusions:   hydromorphone in 0.9 % NaCl 6 mg/30 ml Stopped (02/02/23 0828)     Scheduled Meds:   acetaminophen  1,000 mg Oral TID    buPROPion  300 mg Oral Daily    gabapentin  600 mg Oral TID    lactulose  20 g Oral TID    meropenem (MERREM) IVPB  2 g Intravenous Q8H    methocarbamoL  1,000 mg Oral QID    pantoprazole  40 mg Oral Daily    polyethylene glycol  17 g Oral BID    senna-docusate 8.6-50 mg  2 tablet Oral BID    sodium chloride 0.9%  10 mL Intravenous Q6H     PRN Meds:sodium chloride, sodium chloride, sodium chloride, sodium chloride, sodium chloride, sodium chloride, sodium chloride, dextrose 10%, dextrose 10%, diazePAM, glucagon (human recombinant), glucose, glucose, hydrALAZINE, labetalol, magnesium hydroxide 400 mg/5 ml, magnesium oxide, magnesium oxide, melatonin, naloxone, ondansetron, oxyCODONE, polyethylene glycol, potassium bicarbonate, potassium bicarbonate, potassium bicarbonate, potassium, sodium phosphates, potassium, sodium phosphates, potassium, sodium phosphates, prochlorperazine, sodium chloride 0.9%, Flushing PICC Protocol **AND** sodium chloride 0.9% **AND** sodium chloride 0.9%     Review of Systems  Objective:     Weight: 129.5 kg (285 lb 7.9 oz)  Body mass index is 44.71 kg/m².  Vital Signs (Most Recent):  Temp: 98.2 °F (36.8 °C) (02/02/23 0820)  Pulse: (!) 117 (02/02/23 0820)  Resp: (!) 24 (02/02/23 0828)  BP: (!) 90/52 (02/02/23 0820)  SpO2: (!) 94 % (02/02/23 0820) Vital Signs (24h Range):  Temp:  [97.8 °F (36.6 °C)-98.5 °F (36.9 °C)] 98.2 °F (36.8 °C)  Pulse:  [106-119] 117  Resp:  [15-46] 24  SpO2:  [89 %-100 %] 94 %  BP: ()/(47-78) 90/52     Date 02/02/23 0700 - 02/03/23 0659   Shift 7322-6458 4192-2472 9024-0938 24 Hour Total   INTAKE   Blood  446   446   IV Piggyback 48.9   48.9   Shift Total(mL/kg) 494.9(3.8)   494.9(3.8)   OUTPUT   Shift Total(mL/kg)       Weight (kg) 129.5 129.5 129.5 129.5                            Neurosurgery Physical Exam  General: well developed, well nourished, no distress.   Head: normocephalic, atraumatic  Neurologic: Alert and oriented. Thought content appropriate.  GCS: Motor: 6/Verbal: 5/Eyes: 4 GCS Total: 15  Mental Status: Awake, Alert, Oriented x 4  Language: No aphasia  Speech: No dysarthria  Cranial nerves: face symmetric, tongue midline, CN II-XII grossly intact.   Eyes: pupils equal, round, reactive to light with accommodation, EOMI, nystagmus.   Pulmonary: normal respirations, no signs of respiratory distress  Abdomen: soft, non-distended, not tender to palpation  Skin: Skin is warm, dry and intact.  Sensory: intact to light touch throughout     Motor Strength:Moves all extremities spontaneously with good tone.  Full strength upper and extremities. No abnormal movements seen.      Strength   Deltoids Triceps Biceps Wrist Extension Wrist Flexion Hand    Upper: R 5/5 5/5 5/5 5/5 5/5 5/5     L 5/5 5/5 5/5 5/5 5/5 5/5       Iliopsoas Quadriceps Knee  Flexion Tibialis  anterior Gastro- cnemius EHL   Lower: R 3/5 4+/5 4+/5 4/5 4-/5 4/5     L 3/5 4+/5 4+/5 4/5 4-/5 4/5      Reflexes:   DTR: 2+ symmetrically throughout.  Parker's: Negative.  Babinski's: Present bilaterally  Clonus: 2 beats bilateral lower extremity     Incision well healed       Significant Labs:  Recent Labs   Lab 02/01/23  0129 02/02/23  0346    101    136   K 4.4 4.3    102   CO2 29 27   BUN 18 16   CREATININE 0.5 0.5   CALCIUM 7.6* 8.0*   MG 1.8 1.8       Recent Labs   Lab 02/01/23  1446 02/02/23  0036 02/02/23  0346   WBC 2.33* 2.29* 3.21*   HGB 6.9* 7.4* 7.3*   HCT 22.4* 24.7* 23.8*   PLT 82* 83* 90*       Recent Labs   Lab 02/01/23  1113   INR 1.2       Microbiology Results (last 7 days)       Procedure Component Value Units  Date/Time    Blood culture [[id number]] Collected: 01/27/23 0120    Order Status: Completed Specimen: Blood from Peripheral, Forearm, Right Updated: 02/01/23 0612     Blood Culture, Routine No growth after 5 days.    Blood culture [[id number]] Collected: 01/27/23 0123    Order Status: Completed Specimen: Blood from Peripheral, Antecubital, Left Updated: 02/01/23 0612     Blood Culture, Routine No growth after 5 days.    Culture, Anaerobe [[id number]] Collected: 01/24/23 0943    Order Status: Completed Specimen: Bone from Back Updated: 01/30/23 0943     Anaerobic Culture No anaerobes isolated    Narrative:      4) Perispinal    Culture, Anaerobe [[id number]] Collected: 01/24/23 0943    Order Status: Completed Specimen: Bone from Back Updated: 01/30/23 0942     Anaerobic Culture No anaerobes isolated    Narrative:      3) Perispinal    Culture, Anaerobe [[id number]] Collected: 01/24/23 0913    Order Status: Completed Specimen: Body Fluid from Back Updated: 01/30/23 0942     Anaerobic Culture No anaerobes isolated    Narrative:      2) Perispinal    Culture, Anaerobe [[id number]] Collected: 01/24/23 0913    Order Status: Completed Specimen: Body Fluid from Back Updated: 01/30/23 0859     Anaerobic Culture No anaerobes isolated    Narrative:      1) Perispinal    Aerobic culture [[id number]]  (Abnormal)  (Susceptibility) Collected: 01/24/23 0943    Order Status: Completed Specimen: Bone from Back Updated: 01/28/23 1145     Aerobic Bacterial Culture ESCHERICHIA COLI ESBL  From broth only      Narrative:      4) Perispinal    Aerobic culture [[id number]]  (Abnormal)  (Susceptibility) Collected: 01/24/23 0914    Order Status: Completed Specimen: Wound from Back Updated: 01/26/23 1306     Aerobic Bacterial Culture ESCHERICHIA COLI  Rare      Narrative:      1) Perispinal    Aerobic culture [[id number]]  (Abnormal)  (Susceptibility) Collected: 01/24/23 0914    Order Status: Completed Specimen: Wound from Back Updated: 01/26/23 1306      Aerobic Bacterial Culture ESCHERICHIA COLI ESBL  Few      Narrative:      2) Perispinal    Aerobic culture [[id number]]  (Abnormal)  (Susceptibility) Collected: 01/24/23 0943    Order Status: Completed Specimen: Bone from Back Updated: 01/26/23 1252     Aerobic Bacterial Culture ESCHERICHIA COLI ESBL  Few      Narrative:      3) Perispinal          All pertinent labs from the last 24 hours have been reviewed.    Significant Diagnostics:  CT C-spine:  Vertebrae: The dens, lateral masses, and occipital condyles are intact.  There is no evidence of fracture or dislocation.     Discs: Multilevel disc height loss and degenerative endplate change.  Prominent C6 inferior endplate Schmorl's node, similar in appearance dating back to MRI from 02/14/2022.     Degenerative changes: Sent spinal canal dimensions at C3-C4 are 10.5 mm, 8.5 mm at C4-C5, 10.0 mm at C5-C6, and 10.8 mm at C6-C7 .  Multilevel uncovertebral spurring with multilevel neural foraminal narrowing most pronounced at C5-C6 with moderate right neural foraminal narrowing and C6-C7 with moderate left neural foraminal narrowing.    Physical Exam  Neurosurgery Physical Exam

## 2023-02-02 NOTE — BRIEF OP NOTE
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     Estimated Blood Loss: 400 mL            Specimens:   Specimen (24h ago, onward)      None            [id number]

## 2023-02-03 LAB
ALBUMIN SERPL BCP-MCNC: 2.1 G/DL (ref 3.5–5.2)
ALP SERPL-CCNC: 99 U/L (ref 55–135)
ALT SERPL W/O P-5'-P-CCNC: 29 U/L (ref 10–44)
ANION GAP SERPL CALC-SCNC: 8 MMOL/L (ref 8–16)
AST SERPL-CCNC: 48 U/L (ref 10–40)
BASOPHILS # BLD AUTO: 0 K/UL (ref 0–0.2)
BASOPHILS NFR BLD: 0 % (ref 0–1.9)
BILIRUB SERPL-MCNC: 4.6 MG/DL (ref 0.1–1)
BUN SERPL-MCNC: 22 MG/DL (ref 6–20)
CALCIUM SERPL-MCNC: 7.6 MG/DL (ref 8.7–10.5)
CHLORIDE SERPL-SCNC: 104 MMOL/L (ref 95–110)
CO2 SERPL-SCNC: 24 MMOL/L (ref 23–29)
CREAT SERPL-MCNC: 0.5 MG/DL (ref 0.5–1.4)
DIFFERENTIAL METHOD: ABNORMAL
EOSINOPHIL # BLD AUTO: 0 K/UL (ref 0–0.5)
EOSINOPHIL NFR BLD: 0 % (ref 0–8)
ERYTHROCYTE [DISTWIDTH] IN BLOOD BY AUTOMATED COUNT: 18 % (ref 11.5–14.5)
EST. GFR  (NO RACE VARIABLE): >60 ML/MIN/1.73 M^2
GLUCOSE SERPL-MCNC: 117 MG/DL (ref 70–110)
HCT VFR BLD AUTO: 22.8 % (ref 37–48.5)
HGB BLD-MCNC: 7.2 G/DL (ref 12–16)
IMM GRANULOCYTES # BLD AUTO: 0.02 K/UL (ref 0–0.04)
IMM GRANULOCYTES NFR BLD AUTO: 0.9 % (ref 0–0.5)
LYMPHOCYTES # BLD AUTO: 0.3 K/UL (ref 1–4.8)
LYMPHOCYTES NFR BLD: 11.1 % (ref 18–48)
MAGNESIUM SERPL-MCNC: 2 MG/DL (ref 1.6–2.6)
MCH RBC QN AUTO: 28.2 PG (ref 27–31)
MCHC RBC AUTO-ENTMCNC: 31.6 G/DL (ref 32–36)
MCV RBC AUTO: 89 FL (ref 82–98)
MONOCYTES # BLD AUTO: 0.2 K/UL (ref 0.3–1)
MONOCYTES NFR BLD: 10.3 % (ref 4–15)
NEUTROPHILS # BLD AUTO: 1.8 K/UL (ref 1.8–7.7)
NEUTROPHILS NFR BLD: 77.7 % (ref 38–73)
NRBC BLD-RTO: 0 /100 WBC
PHOSPHATE SERPL-MCNC: 2.9 MG/DL (ref 2.7–4.5)
PLATELET # BLD AUTO: 77 K/UL (ref 150–450)
PMV BLD AUTO: 10.1 FL (ref 9.2–12.9)
POTASSIUM SERPL-SCNC: 4.6 MMOL/L (ref 3.5–5.1)
PROT SERPL-MCNC: 4.6 G/DL (ref 6–8.4)
RBC # BLD AUTO: 2.55 M/UL (ref 4–5.4)
SODIUM SERPL-SCNC: 136 MMOL/L (ref 136–145)
WBC # BLD AUTO: 2.34 K/UL (ref 3.9–12.7)

## 2023-02-03 PROCEDURE — 94761 N-INVAS EAR/PLS OXIMETRY MLT: CPT

## 2023-02-03 PROCEDURE — 84100 ASSAY OF PHOSPHORUS: CPT | Performed by: STUDENT IN AN ORGANIZED HEALTH CARE EDUCATION/TRAINING PROGRAM

## 2023-02-03 PROCEDURE — 25000003 PHARM REV CODE 250

## 2023-02-03 PROCEDURE — 25000003 PHARM REV CODE 250: Performed by: INTERNAL MEDICINE

## 2023-02-03 PROCEDURE — 99233 PR SUBSEQUENT HOSPITAL CARE,LEVL III: ICD-10-PCS | Mod: ,,, | Performed by: PSYCHIATRY & NEUROLOGY

## 2023-02-03 PROCEDURE — 63600175 PHARM REV CODE 636 W HCPCS: Performed by: NURSE PRACTITIONER

## 2023-02-03 PROCEDURE — 85025 COMPLETE CBC W/AUTO DIFF WBC: CPT

## 2023-02-03 PROCEDURE — 25000003 PHARM REV CODE 250: Performed by: STUDENT IN AN ORGANIZED HEALTH CARE EDUCATION/TRAINING PROGRAM

## 2023-02-03 PROCEDURE — 80053 COMPREHEN METABOLIC PANEL: CPT | Performed by: STUDENT IN AN ORGANIZED HEALTH CARE EDUCATION/TRAINING PROGRAM

## 2023-02-03 PROCEDURE — 20000000 HC ICU ROOM

## 2023-02-03 PROCEDURE — 99233 PR SUBSEQUENT HOSPITAL CARE,LEVL III: ICD-10-PCS | Mod: ,,, | Performed by: INTERNAL MEDICINE

## 2023-02-03 PROCEDURE — 99233 SBSQ HOSP IP/OBS HIGH 50: CPT | Mod: ,,, | Performed by: PSYCHIATRY & NEUROLOGY

## 2023-02-03 PROCEDURE — 25000003 PHARM REV CODE 250: Performed by: PSYCHIATRY & NEUROLOGY

## 2023-02-03 PROCEDURE — 63600175 PHARM REV CODE 636 W HCPCS

## 2023-02-03 PROCEDURE — 63600175 PHARM REV CODE 636 W HCPCS: Performed by: STUDENT IN AN ORGANIZED HEALTH CARE EDUCATION/TRAINING PROGRAM

## 2023-02-03 PROCEDURE — 83735 ASSAY OF MAGNESIUM: CPT | Performed by: STUDENT IN AN ORGANIZED HEALTH CARE EDUCATION/TRAINING PROGRAM

## 2023-02-03 PROCEDURE — 25000003 PHARM REV CODE 250: Performed by: NURSE PRACTITIONER

## 2023-02-03 PROCEDURE — 27000207 HC ISOLATION

## 2023-02-03 PROCEDURE — A4216 STERILE WATER/SALINE, 10 ML: HCPCS | Performed by: PSYCHIATRY & NEUROLOGY

## 2023-02-03 PROCEDURE — 99233 SBSQ HOSP IP/OBS HIGH 50: CPT | Mod: ,,, | Performed by: INTERNAL MEDICINE

## 2023-02-03 PROCEDURE — 99900035 HC TECH TIME PER 15 MIN (STAT)

## 2023-02-03 RX ORDER — DIAZEPAM 10 MG/2ML
2.5 INJECTION INTRAMUSCULAR ONCE
Status: COMPLETED | OUTPATIENT
Start: 2023-02-03 | End: 2023-02-03

## 2023-02-03 RX ORDER — PHENYLEPHRINE HCL IN 0.9% NACL 20MG/250ML
0-5 PLASTIC BAG, INJECTION (ML) INTRAVENOUS CONTINUOUS
Status: DISCONTINUED | OUTPATIENT
Start: 2023-02-03 | End: 2023-02-04

## 2023-02-03 RX ADMIN — POLYETHYLENE GLYCOL 3350 17 G: 17 POWDER, FOR SOLUTION ORAL at 09:02

## 2023-02-03 RX ADMIN — Medication 0.5 MCG/KG/MIN: at 07:02

## 2023-02-03 RX ADMIN — PANTOPRAZOLE SODIUM 40 MG: 40 TABLET, DELAYED RELEASE ORAL at 08:02

## 2023-02-03 RX ADMIN — SENNOSIDES AND DOCUSATE SODIUM 2 TABLET: 50; 8.6 TABLET ORAL at 08:02

## 2023-02-03 RX ADMIN — ACETAMINOPHEN 1000 MG: 500 TABLET ORAL at 09:02

## 2023-02-03 RX ADMIN — DIAZEPAM 2.5 MG: 5 INJECTION, SOLUTION INTRAMUSCULAR; INTRAVENOUS at 02:02

## 2023-02-03 RX ADMIN — METHOCARBAMOL 1000 MG: 500 TABLET ORAL at 08:02

## 2023-02-03 RX ADMIN — SODIUM CHLORIDE 500 ML: 0.9 INJECTION, SOLUTION INTRAVENOUS at 06:02

## 2023-02-03 RX ADMIN — METHOCARBAMOL 1000 MG: 500 TABLET ORAL at 12:02

## 2023-02-03 RX ADMIN — METHOCARBAMOL 1000 MG: 500 TABLET ORAL at 04:02

## 2023-02-03 RX ADMIN — MEROPENEM 2 G: 1 INJECTION INTRAVENOUS at 05:02

## 2023-02-03 RX ADMIN — Medication 10 ML: at 06:02

## 2023-02-03 RX ADMIN — GABAPENTIN 600 MG: 300 CAPSULE ORAL at 08:02

## 2023-02-03 RX ADMIN — OXYCODONE HYDROCHLORIDE 10 MG: 10 TABLET ORAL at 12:02

## 2023-02-03 RX ADMIN — DIAZEPAM 5 MG: 5 TABLET ORAL at 03:02

## 2023-02-03 RX ADMIN — SENNOSIDES AND DOCUSATE SODIUM 2 TABLET: 50; 8.6 TABLET ORAL at 09:02

## 2023-02-03 RX ADMIN — GABAPENTIN 600 MG: 300 CAPSULE ORAL at 02:02

## 2023-02-03 RX ADMIN — METHOCARBAMOL 1000 MG: 500 TABLET ORAL at 09:02

## 2023-02-03 RX ADMIN — OXYCODONE HYDROCHLORIDE 10 MG: 10 TABLET ORAL at 07:02

## 2023-02-03 RX ADMIN — LACTULOSE 20 G: 20 SOLUTION ORAL at 09:02

## 2023-02-03 RX ADMIN — SODIUM CHLORIDE 1000 ML: 9 INJECTION, SOLUTION INTRAVENOUS at 04:02

## 2023-02-03 RX ADMIN — OXYCODONE HYDROCHLORIDE 10 MG: 10 TABLET ORAL at 03:02

## 2023-02-03 RX ADMIN — Medication 10 ML: at 12:02

## 2023-02-03 RX ADMIN — OXYCODONE HYDROCHLORIDE 10 MG: 10 TABLET ORAL at 09:02

## 2023-02-03 RX ADMIN — ACETAMINOPHEN 1000 MG: 500 TABLET ORAL at 02:02

## 2023-02-03 RX ADMIN — MEROPENEM 2 G: 1 INJECTION INTRAVENOUS at 08:02

## 2023-02-03 RX ADMIN — Medication 6 MG: at 11:02

## 2023-02-03 RX ADMIN — BUPROPION HYDROCHLORIDE 300 MG: 300 TABLET, FILM COATED, EXTENDED RELEASE ORAL at 08:02

## 2023-02-03 RX ADMIN — Medication 10 ML: at 11:02

## 2023-02-03 RX ADMIN — GABAPENTIN 600 MG: 300 CAPSULE ORAL at 09:02

## 2023-02-03 RX ADMIN — Medication: at 09:02

## 2023-02-03 RX ADMIN — ACETAMINOPHEN 1000 MG: 500 TABLET ORAL at 08:02

## 2023-02-03 NOTE — PLAN OF CARE
[facility] Care Plan    POC reviewed with Rachel Zazueta at 1400. Pt verbalized understanding. Questions and concerns addressed. No acute events today. Pt progressing toward goals. Will continue to monitor. See below and flowsheets for full assessment and VS info.     -pain management achieved via hydromorphone PCA pump and PRN oxy 10 Q4.  -4 MIHIR drains and wound vac in place        Is this a stroke patient? no    Neuro:  [place] Coma Scale  Best Eye Response: 4-->(E4) spontaneous  Best Motor Response: 6-->(M6) obeys commands  Best Verbal Response: 5-->(V5) oriented  [place] Coma Scale Score: 15  Assessment Qualifiers: patient not sedated/intubated, no eye obstruction present  Pupil PERRLA: yes     24 hr Temp:  [94 °F (34.4 °C)-98.5 °F (36.9 °C)]     CV:   Rhythm: sinus tachycardia  BP goals:   SBP < 160  MAP > 85    Resp:      Oxygen Concentration (%): 24    Plan: N/A    GI/:     Diet/Nutrition Received: NPO  Last Bowel Movement: 02/02/23  Voiding Characteristics: urethral catheter (bladder)    Intake/Output Summary (Last 24 hours) at 2/3/2023 1520  Last data filed at 2/3/2023 1401  Gross per 24 hour   Intake 1108.91 ml   Output 2315 ml   Net -1206.09 ml     Unmeasured Output  Urine Occurrence: 1  Stool Occurrence: 0  Pad Count: 1    Labs/Accuchecks:  Recent Labs   Lab 02/03/23  0159   WBC 2.34*   RBC 2.55*   HGB 7.2*   HCT 22.8*   PLT 77*      Recent Labs   Lab 02/03/23  0159      K 4.6   CO2 24      BUN 22*   CREATININE 0.5   ALKPHOS 99   ALT 29   AST 48*   BILITOT 4.6*      Recent Labs   Lab 02/01/23  1113   INR 1.2    No results for input(s): CPK, CPKMB, TROPONINI, MB in the last 168 hours.    Electrolytes: N/A - electrolytes WDL  Accuchecks: none    Gtts:   hydromorphone in 0.9 % NaCl 6 mg/30 ml         LDA/Wounds:  Lines/Drains/Airways       Peripherally Inserted Central Catheter Line  Duration             PICC Double Lumen 01/26/23 1209 left basilic 8 days              Drain  Duration                   Closed/Suction Drain 01/24/23 Posterior Back Accordion 10 Fr. 10 days         Closed/Suction Drain 01/24/23 Posterior;Right Back Accordion 10 Fr. 10 days    Female External Urinary Catheter 01/27/23 1115 7 days         Closed/Suction Drain 02/02/23 1401 Right Back Bulb 15 Fr. 1 day         Closed/Suction Drain 02/02/23 1402 Left Back Bulb 15 Fr. 1 day         Closed/Suction Drain 02/02/23 1402 Left Back Bulb 15 Fr. 1 day         Closed/Suction Drain 02/02/23 1402 Right Back Bulb 15 Fr. 1 day         Urethral Catheter 02/02/23 0853 Non-latex;Straight-tip 16 Fr. 1 day              Arterial Line  Duration             Arterial Line 02/02/23 1206 Right Radial 1 day              Peripheral Intravenous Line  Duration                  Midline Catheter Insertion/Assessment  - Single Lumen 01/12/23 2138 Right basilic vein (medial side of arm) 18g x 10cm 21 days         Peripheral IV - Single Lumen 02/02/23 0900 20 G Left Hand 1 day                  Wounds: No  Wound care consulted: No

## 2023-02-03 NOTE — PROGRESS NOTES
Plastic and Reconstructive Surgery   Progress Note    Subjective:  AF. Tachy overnight. Drains with 1L of SSO. Right drain with thin medium red output.  Prevena with out leak.     Objective:  Vital signs in last 24 hours:  Temp:  [94 °F (34.4 °C)-98.3 °F (36.8 °C)] 98.3 °F (36.8 °C)  Pulse:  [] 124  Resp:  [12-33] 18  SpO2:  [91 %-100 %] 98 %  BP: ()/(52-64) 106/59  Arterial Line BP: ()/(51-80) 122/67    Intake/Output last 3 shifts:  I/O last 3 completed shifts:  In: 6258.8 [I.V.:543; Blood:2260; IV Piggyback:3455.8]  Out: 3760 [Urine:1740; Drains:1620; Blood:400]    Intake/Output this shift:  I/O this shift:  In: -   Out: 290 [Urine:20; Drains:270]        Physical Exam:  VITAL SIGNS:   Vitals:    23 0503 23 0603 23 0701 23 0736   BP: (!) 99/57 (!) 100/59 (!) 106/59    BP Location:   Right arm    Patient Position:   Lying    Pulse: (!) 123 (!) 120 (!) 124    Resp: 19 18 18 18   Temp:   98.3 °F (36.8 °C)    TempSrc:   Oral    SpO2: 97% 99% 98%    Weight:       Height:         TMAX: Temp (24hrs), Av.3 °F (36.3 °C), Min:94 °F (34.4 °C), Max:98.3 °F (36.8 °C)    General: Alert; No acute distress  Cardiovascular: Regular rate   Respiratory: Normal respiratory effort. Chest rise symmetric.   Abdomen: Soft, nontender, nondistended  Extremity: Moves all extremities equally.  Neurologic: No focal deficit. Speech normal      Scheduled Medications acetaminophen, 1,000 mg, TID  buPROPion, 300 mg, Daily  gabapentin, 600 mg, TID  lactulose, 20 g, TID  meropenem (MERREM) IVPB, 2 g, Q8H  methocarbamoL, 1,000 mg, QID  pantoprazole, 40 mg, Daily  polyethylene glycol, 17 g, BID  senna-docusate 8.6-50 mg, 2 tablet, BID  sodium chloride 0.9%, 10 mL, Q6H      PRN Medications sodium chloride, sodium chloride, sodium chloride, sodium chloride, sodium chloride, sodium chloride, sodium chloride, dextrose 10%, dextrose 10%, diazePAM, glucagon (human recombinant), glucose, glucose, hydrALAZINE,  labetalol, magnesium hydroxide 400 mg/5 ml, magnesium oxide, magnesium oxide, melatonin, naloxone, ondansetron, oxyCODONE, polyethylene glycol, potassium bicarbonate, potassium bicarbonate, potassium bicarbonate, potassium, sodium phosphates, potassium, sodium phosphates, potassium, sodium phosphates, prochlorperazine, sodium chloride 0.9%, Flushing PICC Protocol **AND** sodium chloride 0.9% **AND** sodium chloride 0.9%    Recent Labs:   Lab Results   Component Value Date    WBC 2.34 (L) 02/03/2023    HGB 7.2 (L) 02/03/2023    HCT 22.8 (L) 02/03/2023    MCV 89 02/03/2023    PLT 77 (L) 02/03/2023     Lab Results   Component Value Date     (H) 02/03/2023     02/03/2023    K 4.6 02/03/2023     02/03/2023    BUN 22 (H) 02/03/2023         Assessment: 42 y.o. y/o female 1 Day Post-Op s/p Procedure(s):  LAMINECTOMY, SPINE, THORACIC, WITH FUSION (T4-Pelvis fusion w/ T9-10 corpectomies) **AIRO Doing well postoperatively.  Wound vac without leak  Drains with SSO     Plan  -Drains to suction. Continue to monitor output  -Continue prevena  -Rest of care per primary    RACHEL Zambrano MD  PGY-1

## 2023-02-03 NOTE — PT/OT/SLP PROGRESS
Physical Therapy      Patient Name:  Rachel Zazueta   MRN:  [id number]    Patient not seen today secondary to  (pt with HOB restrictions of 15-20* active in chart from 1/24/23, notified MD of orders, MD reported that would have to see what changes NSGY placed in orders post surgery.  No changes yet to active orders (HOB 15-20* orders) therefore will hold therapy on this date.). Will follow-up as appropriate.

## 2023-02-03 NOTE — SUBJECTIVE & OBJECTIVE
Interval History: 2/3: pt slow to awake from surgery but this morning back to neurosurgical baseline.     Medications:  Continuous Infusions:   hydromorphone in 0.9 % NaCl 6 mg/30 ml       Scheduled Meds:   acetaminophen  1,000 mg Oral TID    buPROPion  300 mg Oral Daily    gabapentin  600 mg Oral TID    lactulose  20 g Oral TID    meropenem (MERREM) IVPB  2 g Intravenous Q8H    methocarbamoL  1,000 mg Oral QID    pantoprazole  40 mg Oral Daily    polyethylene glycol  17 g Oral BID    senna-docusate 8.6-50 mg  2 tablet Oral BID    sodium chloride 0.9%  10 mL Intravenous Q6H     PRN Meds:sodium chloride, sodium chloride, sodium chloride, sodium chloride, sodium chloride, sodium chloride, sodium chloride, dextrose 10%, dextrose 10%, diazePAM, glucagon (human recombinant), glucose, glucose, hydrALAZINE, labetalol, magnesium hydroxide 400 mg/5 ml, magnesium oxide, magnesium oxide, melatonin, naloxone, ondansetron, oxyCODONE, polyethylene glycol, potassium bicarbonate, potassium bicarbonate, potassium bicarbonate, potassium, sodium phosphates, potassium, sodium phosphates, potassium, sodium phosphates, prochlorperazine, sodium chloride 0.9%, Flushing PICC Protocol **AND** sodium chloride 0.9% **AND** sodium chloride 0.9%     Review of Systems  Objective:     Weight: 129.5 kg (285 lb 7.9 oz)  Body mass index is 44.71 kg/m².  Vital Signs (Most Recent):  Temp: 98.3 °F (36.8 °C) (02/03/23 0701)  Pulse: (!) 129 (02/03/23 0901)  Resp: 20 (02/03/23 0901)  BP: 112/65 (02/03/23 0901)  SpO2: 97 % (02/03/23 0901) Vital Signs (24h Range):  Temp:  [94 °F (34.4 °C)-98.3 °F (36.8 °C)] 98.3 °F (36.8 °C)  Pulse:  [] 129  Resp:  [12-30] 20  SpO2:  [91 %-100 %] 97 %  BP: ()/(52-65) 112/65  Arterial Line BP: ()/(51-80) 133/73     Date 02/03/23 0700 - 02/04/23 0659   Shift 9327-3392 7496-0421 8161-8158 24 Hour Total   INTAKE   Shift Total(mL/kg)       OUTPUT   Urine(mL/kg/hr) 75   75   Drains 270   270   Shift Total(mL/kg)  345(2.7)   345(2.7)   Weight (kg) 129.5 129.5 129.5 129.5                            Neurosurgery Physical Exam  General: well developed, well nourished, no distress.   Head: normocephalic, atraumatic  Neurologic: Alert and oriented. Thought content appropriate.  GCS: Motor: 6/Verbal: 5/Eyes: 4 GCS Total: 15  Mental Status: Awake, Alert, Oriented x 4  Language: No aphasia  Speech: No dysarthria  Cranial nerves: face symmetric, tongue midline, CN II-XII grossly intact.   Eyes: pupils equal, round, reactive to light with accommodation, EOMI, nystagmus.   Pulmonary: normal respirations, no signs of respiratory distress  Abdomen: soft, non-distended, not tender to palpation  Skin: Skin is warm, dry and intact.  Sensory: intact to light touch throughout     Motor Strength:Moves all extremities spontaneously with good tone.  Full strength upper and extremities. No abnormal movements seen.      Strength   Deltoids Triceps Biceps Wrist Extension Wrist Flexion Hand    Upper: R 5/5 5/5 5/5 5/5 5/5 5/5     L 5/5 5/5 5/5 5/5 5/5 5/5       Iliopsoas Quadriceps Knee  Flexion Tibialis  anterior Gastro- cnemius EHL   Lower: R 3/5 4+/5 4+/5 4/5 4-/5 4/5     L 3/5 4+/5 4+/5 4/5 4-/5 4/5      Reflexes:   DTR: 2+ symmetrically throughout.  Parker's: Negative.  Babinski's: Present bilaterally  Clonus: 2 beats bilateral lower extremity     Incision well healed       Significant Labs:  Recent Labs   Lab 02/02/23  0346 02/03/23  0159    117*    136   K 4.3 4.6    104   CO2 27 24   BUN 16 22*   CREATININE 0.5 0.5   CALCIUM 8.0* 7.6*   MG 1.8 2.0       Recent Labs   Lab 02/02/23  0825 02/02/23  1203 02/02/23  1447 02/02/23  1510 02/03/23  0159   WBC 1.88*  --   --  9.91 2.34*   HGB 7.1*  --   --  7.8* 7.2*   HCT 22.6*   < > 21* 25.1* 22.8*   PLT 97*  --   --  158 77*    < > = values in this interval not displayed.       Recent Labs   Lab 02/01/23  1113   INR 1.2       Microbiology Results (last 7 days)       Procedure  Component Value Units Date/Time    Fungus culture [[id number]] Collected: 01/24/23 0913    Order Status: Completed Specimen: Body Fluid from Back Updated: 02/02/23 0954     Fungus (Mycology) Culture Culture in progress    Narrative:      2) Perispinal    Fungus culture [[id number]] Collected: 01/24/23 0913    Order Status: Completed Specimen: Body Fluid from Back Updated: 02/02/23 0954     Fungus (Mycology) Culture Culture in progress    Narrative:      1) Perispinal    Fungus culture [[id number]] Collected: 01/24/23 0943    Order Status: Completed Specimen: Bone from Back Updated: 02/02/23 0954     Fungus (Mycology) Culture Culture in progress    Narrative:      3) Perispinal    Fungus culture [[id number]] Collected: 01/24/23 0943    Order Status: Completed Specimen: Bone from Back Updated: 02/02/23 0954     Fungus (Mycology) Culture Culture in progress    Narrative:      4) Perispinal    Blood culture [[id number]] Collected: 01/27/23 0120    Order Status: Completed Specimen: Blood from Peripheral, Forearm, Right Updated: 02/01/23 0612     Blood Culture, Routine No growth after 5 days.    Blood culture [[id number]] Collected: 01/27/23 0123    Order Status: Completed Specimen: Blood from Peripheral, Antecubital, Left Updated: 02/01/23 0612     Blood Culture, Routine No growth after 5 days.    Culture, Anaerobe [[id number]] Collected: 01/24/23 0943    Order Status: Completed Specimen: Bone from Back Updated: 01/30/23 0943     Anaerobic Culture No anaerobes isolated    Narrative:      4) Perispinal    Culture, Anaerobe [[id number]] Collected: 01/24/23 0943    Order Status: Completed Specimen: Bone from Back Updated: 01/30/23 0942     Anaerobic Culture No anaerobes isolated    Narrative:      3) Perispinal    Culture, Anaerobe [[id number]] Collected: 01/24/23 0913    Order Status: Completed Specimen: Body Fluid from Back Updated: 01/30/23 0942     Anaerobic Culture No anaerobes isolated    Narrative:      2) Perispinal     Culture, Anaerobe [[id number]] Collected: 01/24/23 0913    Order Status: Completed Specimen: Body Fluid from Back Updated: 01/30/23 0859     Anaerobic Culture No anaerobes isolated    Narrative:      1) Perispinal    Aerobic culture [[id number]]  (Abnormal)  (Susceptibility) Collected: 01/24/23 0943    Order Status: Completed Specimen: Bone from Back Updated: 01/28/23 1145     Aerobic Bacterial Culture ESCHERICHIA COLI ESBL  From broth only      Narrative:      4) Perispinal          All pertinent labs from the last 24 hours have been reviewed.    Significant Diagnostics:  CT C-spine:  Vertebrae: The dens, lateral masses, and occipital condyles are intact.  There is no evidence of fracture or dislocation.     Discs: Multilevel disc height loss and degenerative endplate change.  Prominent C6 inferior endplate Schmorl's node, similar in appearance dating back to MRI from 02/14/2022.     Degenerative changes: Sent spinal canal dimensions at C3-C4 are 10.5 mm, 8.5 mm at C4-C5, 10.0 mm at C5-C6, and 10.8 mm at C6-C7 .  Multilevel uncovertebral spurring with multilevel neural foraminal narrowing most pronounced at C5-C6 with moderate right neural foraminal narrowing and C6-C7 with moderate left neural foraminal narrowing.    Physical Exam  Neurosurgery Physical Exam

## 2023-02-03 NOTE — ASSESSMENT & PLAN NOTE
Patient has reportedly refused transplant evaluation in the past.   -Daily CMP and trend LFTs  -Continue Lactulose 20g TID   -Will need GI/hepatology follow up outpatient    MELD-Na score: 15 at 2/3/2023  1:59 AM  MELD score: 14 at 2/3/2023  1:59 AM  Calculated from:  Serum Creatinine: 0.5 mg/dL (Using min of 1 mg/dL) at 2/3/2023  1:59 AM  Serum Sodium: 136 mmol/L at 2/3/2023  1:59 AM  Total Bilirubin: 4.6 mg/dL at 2/3/2023  1:59 AM  INR(ratio): 1.2 at 2/1/2023 11:13 AM  Age: 42 years

## 2023-02-03 NOTE — ASSESSMENT & PLAN NOTE
41 yo F w/ history of multiple spinal surgeries presented to OSH with possible infection of patient's shoulder and found to have UTI and E. Coli bacteremia and progressively worse B/L LE weakness. Transferred to [facility] for neurosurgical evaluation. Underwent Laminectomy T8-T10; Posterior spinal fusion T8-T12; hardware removal and washout. Admitted to [facility] for HLOC. Arrived to unit on PCA dilaudid pump. Patient is HDS on 3L NC.   - Admit to [facility]  - NSGY, ID following  - q2hr neuro checks, vitals, I/Os  - HV drains x 2 in place, management per NSGY  -Transfuse for Hg<7  - Cultures positive for ESBL, blood cultures with NGTD. WCTM  - Antibiotics per ID recs  - CMP, Mag, Phos, CBC daily  - MAP goals > 65, SBP < 180  - PRN labetalol, hydralazine  - MM pain regimen; PCA Dilaudid pump for pain management  - Patient will need to lie flat with HOB 15-20°   - Aggressive bowel regimen  - PT/OT as appropriate   - VTE prophylaxis: mechanical, hold chemical given pancytopenia  - post operative management - POD#0

## 2023-02-03 NOTE — PROGRESS NOTES
[facility] - [facility] Critical Care  Neurosurgery  Progress Note    Subjective:     History of Present Illness: Ms. Zazueta is a 42 y.o F w/ hx ofIV drug use (methamphetamine), chronic HCV with cirrhosis, spinal fusion (04/2021), epidural abscess with removal of hardware (02/2022), spinal fusion with hardware (T10 - Pelvis / 03/2022), obesity (BMI 39.3) and neurogenic bladder and recent shoulder surgery who initially presented to [facility] for evaluation of back pain and left leg weakness.  She reports initially noting the left leg weakness when she was about to go to the bathroom and was about to fall but was assisted by her boyfriend.  She was brought to the ED via EMS.  Urinalysis with UTI concerns and blood cultures at OSH grew ESBL E coli, and shoulder effusion concern for infection so she was treated with meropenem.  During hospitalization, she reports the weakness that started out in her left leg initially then spread upwards to her left thigh, left hip, then crossed over to the right hip and spread to her right leg.  Denies recent IV drug use. She reports her last incidence of drug use was more than 3 years ago and also denies tobacco, and alcohol abuse.  Reports cannabis use.     OSH course notable for concerning urinalysis and blood cultures positive for ESBL E coli treated with meropenem.  She was also admit to the ICU where she was treated with Precedex for significant body aches, irritability, and muscle spasms.  Concerns of a murmur on physical exam so she underwent TTE which did not show any vegetations.  Worsening lower extremity weakness workup with MRI head nonspecific, MRI cervical spine with multilevel spondylosis, X-ray spine with multilevel thoracolumbar fusion and diskectomy, focal kyphosis at T9-10 increased compared to prior.  She was started on prophylaxis amoxicillin due to her history of infected hardware.  MRI spine unable to be completed due to patient's body habitus so she was  transferred to [facility] for further imaging and Neurosurgery, Neurology evaluation.      Post-Op Info:  Procedure(s) (LRB):  LAMINECTOMY, SPINE, THORACIC, WITH FUSION (T4-Pelvis fusion w/ T9-10 corpectomies) **AIRO (N/A)   1 Day Post-Op     Interval History: 2/3: pt slow to awake from surgery but this morning back to neurosurgical baseline.     Medications:  Continuous Infusions:   hydromorphone in 0.9 % NaCl 6 mg/30 ml       Scheduled Meds:   acetaminophen  1,000 mg Oral TID    buPROPion  300 mg Oral Daily    gabapentin  600 mg Oral TID    lactulose  20 g Oral TID    meropenem (MERREM) IVPB  2 g Intravenous Q8H    methocarbamoL  1,000 mg Oral QID    pantoprazole  40 mg Oral Daily    polyethylene glycol  17 g Oral BID    senna-docusate 8.6-50 mg  2 tablet Oral BID    sodium chloride 0.9%  10 mL Intravenous Q6H     PRN Meds:sodium chloride, sodium chloride, sodium chloride, sodium chloride, sodium chloride, sodium chloride, sodium chloride, dextrose 10%, dextrose 10%, diazePAM, glucagon (human recombinant), glucose, glucose, hydrALAZINE, labetalol, magnesium hydroxide 400 mg/5 ml, magnesium oxide, magnesium oxide, melatonin, naloxone, ondansetron, oxyCODONE, polyethylene glycol, potassium bicarbonate, potassium bicarbonate, potassium bicarbonate, potassium, sodium phosphates, potassium, sodium phosphates, potassium, sodium phosphates, prochlorperazine, sodium chloride 0.9%, Flushing PICC Protocol **AND** sodium chloride 0.9% **AND** sodium chloride 0.9%     Review of Systems  Objective:     Weight: 129.5 kg (285 lb 7.9 oz)  Body mass index is 44.71 kg/m².  Vital Signs (Most Recent):  Temp: 98.3 °F (36.8 °C) (02/03/23 0701)  Pulse: (!) 129 (02/03/23 0901)  Resp: 20 (02/03/23 0901)  BP: 112/65 (02/03/23 0901)  SpO2: 97 % (02/03/23 0901) Vital Signs (24h Range):  Temp:  [94 °F (34.4 °C)-98.3 °F (36.8 °C)] 98.3 °F (36.8 °C)  Pulse:  [] 129  Resp:  [12-30] 20  SpO2:  [91 %-100 %] 97 %  BP: ()/(52-65)  112/65  Arterial Line BP: ()/(51-80) 133/73     Date 02/03/23 0700 - 02/04/23 0659   Shift 4906-3304 6017-4966 3372-5900 24 Hour Total   INTAKE   Shift Total(mL/kg)       OUTPUT   Urine(mL/kg/hr) 75   75   Drains 270   270   Shift Total(mL/kg) 345(2.7)   345(2.7)   Weight (kg) 129.5 129.5 129.5 129.5                            Neurosurgery Physical Exam  General: well developed, well nourished, no distress.   Head: normocephalic, atraumatic  Neurologic: Alert and oriented. Thought content appropriate.  GCS: Motor: 6/Verbal: 5/Eyes: 4 GCS Total: 15  Mental Status: Awake, Alert, Oriented x 4  Language: No aphasia  Speech: No dysarthria  Cranial nerves: face symmetric, tongue midline, CN II-XII grossly intact.   Eyes: pupils equal, round, reactive to light with accommodation, EOMI, nystagmus.   Pulmonary: normal respirations, no signs of respiratory distress  Abdomen: soft, non-distended, not tender to palpation  Skin: Skin is warm, dry and intact.  Sensory: intact to light touch throughout     Motor Strength:Moves all extremities spontaneously with good tone.  Full strength upper and extremities. No abnormal movements seen.      Strength   Deltoids Triceps Biceps Wrist Extension Wrist Flexion Hand    Upper: R 5/5 5/5 5/5 5/5 5/5 5/5     L 5/5 5/5 5/5 5/5 5/5 5/5       Iliopsoas Quadriceps Knee  Flexion Tibialis  anterior Gastro- cnemius EHL   Lower: R 3/5 4+/5 4+/5 4/5 4-/5 4/5     L 3/5 4+/5 4+/5 4/5 4-/5 4/5      Reflexes:   DTR: 2+ symmetrically throughout.  Parker's: Negative.  Babinski's: Present bilaterally  Clonus: 2 beats bilateral lower extremity     Incision well healed       Significant Labs:  Recent Labs   Lab 02/02/23  0346 02/03/23  0159    117*    136   K 4.3 4.6    104   CO2 27 24   BUN 16 22*   CREATININE 0.5 0.5   CALCIUM 8.0* 7.6*   MG 1.8 2.0       Recent Labs   Lab 02/02/23  0825 02/02/23  1203 02/02/23  1447 02/02/23  1510 02/03/23  0159   WBC 1.88*  --   --  9.91  2.34*   HGB 7.1*  --   --  7.8* 7.2*   HCT 22.6*   < > 21* 25.1* 22.8*   PLT 97*  --   --  158 77*    < > = values in this interval not displayed.       Recent Labs   Lab 02/01/23  1113   INR 1.2       Microbiology Results (last 7 days)       Procedure Component Value Units Date/Time    Fungus culture [[id number]] Collected: 01/24/23 0913    Order Status: Completed Specimen: Body Fluid from Back Updated: 02/02/23 0954     Fungus (Mycology) Culture Culture in progress    Narrative:      2) Perispinal    Fungus culture [[id number]] Collected: 01/24/23 0913    Order Status: Completed Specimen: Body Fluid from Back Updated: 02/02/23 0954     Fungus (Mycology) Culture Culture in progress    Narrative:      1) Perispinal    Fungus culture [[id number]] Collected: 01/24/23 0943    Order Status: Completed Specimen: Bone from Back Updated: 02/02/23 0954     Fungus (Mycology) Culture Culture in progress    Narrative:      3) Perispinal    Fungus culture [[id number]] Collected: 01/24/23 0943    Order Status: Completed Specimen: Bone from Back Updated: 02/02/23 0954     Fungus (Mycology) Culture Culture in progress    Narrative:      4) Perispinal    Blood culture [[id number]] Collected: 01/27/23 0120    Order Status: Completed Specimen: Blood from Peripheral, Forearm, Right Updated: 02/01/23 0612     Blood Culture, Routine No growth after 5 days.    Blood culture [[id number]] Collected: 01/27/23 0123    Order Status: Completed Specimen: Blood from Peripheral, Antecubital, Left Updated: 02/01/23 0612     Blood Culture, Routine No growth after 5 days.    Culture, Anaerobe [[id number]] Collected: 01/24/23 0943    Order Status: Completed Specimen: Bone from Back Updated: 01/30/23 0943     Anaerobic Culture No anaerobes isolated    Narrative:      4) Perispinal    Culture, Anaerobe [[id number]] Collected: 01/24/23 0943    Order Status: Completed Specimen: Bone from Back Updated: 01/30/23 0942     Anaerobic Culture No anaerobes isolated     Narrative:      3) Perispinal    Culture, Anaerobe [[id number]] Collected: 01/24/23 0913    Order Status: Completed Specimen: Body Fluid from Back Updated: 01/30/23 0942     Anaerobic Culture No anaerobes isolated    Narrative:      2) Perispinal    Culture, Anaerobe [[id number]] Collected: 01/24/23 0913    Order Status: Completed Specimen: Body Fluid from Back Updated: 01/30/23 0859     Anaerobic Culture No anaerobes isolated    Narrative:      1) Perispinal    Aerobic culture [[id number]]  (Abnormal)  (Susceptibility) Collected: 01/24/23 0943    Order Status: Completed Specimen: Bone from Back Updated: 01/28/23 1145     Aerobic Bacterial Culture ESCHERICHIA COLI ESBL  From broth only      Narrative:      4) Perispinal          All pertinent labs from the last 24 hours have been reviewed.    Significant Diagnostics:  CT C-spine:  Vertebrae: The dens, lateral masses, and occipital condyles are intact.  There is no evidence of fracture or dislocation.     Discs: Multilevel disc height loss and degenerative endplate change.  Prominent C6 inferior endplate Schmorl's node, similar in appearance dating back to MRI from 02/14/2022.     Degenerative changes: Sent spinal canal dimensions at C3-C4 are 10.5 mm, 8.5 mm at C4-C5, 10.0 mm at C5-C6, and 10.8 mm at C6-C7 .  Multilevel uncovertebral spurring with multilevel neural foraminal narrowing most pronounced at C5-C6 with moderate right neural foraminal narrowing and C6-C7 with moderate left neural foraminal narrowing.    Physical Exam  Neurosurgery Physical Exam    Assessment/Plan:     * Weakness of both lower extremities  Pt is 42F multiple spinal surgeries and revisions last T10- Pelvis in March 2022 who presented to OSH with concern for shoulder infection and UTI found to have E coli sepsis who has had progressive worsening BLE weakness, XR showed possible worsening proximal junctional kyphotic deformity. Transferred to [facility] to  and neurosurgery was consulted    OR  yesterday for temporary T6-12 PSIF. Taz pus noted intraoperatively.   Given intraoperative findings, new operative plan for T4-pelvis revision and extension of fusion with T9-10 corpectomy with plastic surgery assistance planned for 2/2/23    Recommend to  give platelet transfusion today,     No longer on log roll precautions. T/L x rays today   PT/OT OOG with TLSO in place.     Plan:  --Admitted to ICU, Keep in ICU  .    -- MRI C/T/L spine 1/20 with C7 SP enhancement, focal kyphosis at T8-10 with severe anterior height loss at T9, enhancing C6 inferior endplate Schmorl's node  -- CT T/L spine thin cut 1/20 with haloing of pelvic and T10 screws, spondylodiscitis T8-9, T9-10  -- CT C spine with concern for discitis at C6-7  -- flex ext XR done, limited view of C6-7 in swimmers view 2/2 shoulders  -- 1/20 ESR 70, CRP 9.4. elevated from prior   -- TLSO at bedside  --HV x2 R to gravity, L FS  --ID: merropenam   -- BCx 1/20: NGTD   -- OR cultures 1/24: E. coli  --OR 2/2/23 as above   -- NPO 2/1/23 midnight   -- platelets > 100k   -- multimodal pain control per primary medicine team  --NSGY will continue to follow. Please contact team with any further questions.        Jaylon Gomes MD  Neurosurgery  [facility] - [facility]

## 2023-02-03 NOTE — ANESTHESIA POSTPROCEDURE EVALUATION
Anesthesia Post Evaluation    Patient: Rachel Zazueta    Procedure(s) Performed: Procedure(s) (LRB):  LAMINECTOMY, SPINE, THORACIC, WITH FUSION (T4-Pelvis fusion w/ T9-10 corpectomies) **AIRO (N/A)    Final Anesthesia Type: general      Patient location during evaluation: ICU  Patient participation: Yes- Able to Participate  Level of consciousness: awake and alert  Post-procedure vital signs: reviewed and stable  Pain management: adequate  Airway patency: patent  ZENA mitigation strategies: Extubation while patient is awake and Multimodal analgesia  PONV status at discharge: No PONV  Anesthetic complications: no      Cardiovascular status: stable  Respiratory status: unassisted, spontaneous ventilation and nasal cannula  Hydration status: euvolemic  Follow-up not needed.          Vitals Value Taken Time   /60 02/03/23 1032   Temp 36.8 °C (98.3 °F) 02/03/23 0701   Pulse 120 02/03/23 1037   Resp 20 02/03/23 1037   SpO2 97 % 02/03/23 1037   Vitals shown include unvalidated device data.      No case tracking events are documented in the log.      Pain/Chen Score: Pain Rating Prior to Med Admin: 9 (2/3/2023  8:03 AM)  Pain Rating Post Med Admin: 4 (2/2/2023  6:14 AM)

## 2023-02-03 NOTE — PLAN OF CARE
Patient in OR during rounds, reviewed chart, remains afebrile, no new leukocytosis, s/p laminectomy with NSGY, muscle and skin flap closure with plastic surgery, 4 total drains placed per op note.       Plan    - continue Meropenem IV at current dose  - ID will follow up in AM.

## 2023-02-03 NOTE — PT/OT/SLP PROGRESS
Occupational Therapy      Patient Name:  Rachel Zazueta   MRN:  [id number]    Patient not seen today secondary to  (pt with HOB restrictions of 15-20* active in chart from 1/24/23, notified MD of orders, MD reported that would have to see what changes NS placed in orders post surgery.  No changes yet to active orders (HOB 15-20* orders) therefore will hold therapy on this date.). Will follow-up as appropriate.    2/3/2023   Epidermal Closure: simple interrupted

## 2023-02-03 NOTE — ASSESSMENT & PLAN NOTE
Patient has reportedly refused transplant evaluation in the past.   -Daily CMP and trend LFTs  -Continue Lactulose 20g TID   -Will need GI/hepatology follow up outpatient    MELD-Na score: 10 at 2/2/2023  3:46 AM  MELD score: 10 at 2/2/2023  3:46 AM  Calculated from:  Serum Creatinine: 0.5 mg/dL (Using min of 1 mg/dL) at 2/2/2023  3:46 AM  Serum Sodium: 136 mmol/L at 2/2/2023  3:46 AM  Total Bilirubin: 1.5 mg/dL at 2/2/2023  3:46 AM  INR(ratio): 1.2 at 2/1/2023 11:13 AM  Age: 42 years

## 2023-02-03 NOTE — SUBJECTIVE & OBJECTIVE
Interval History:  See hospital course above.     Review of Systems   Constitutional:  Negative for fever.   HENT:  Negative for congestion and rhinorrhea.    Eyes:  Negative for visual disturbance.   Respiratory:  Negative for shortness of breath.    Cardiovascular:  Negative for chest pain.   Gastrointestinal:  Negative for abdominal distention, abdominal pain, nausea and vomiting.   Genitourinary:  Negative for dysuria and hematuria.   Musculoskeletal:  Positive for back pain. Negative for neck pain.   Neurological:  Positive for weakness. Negative for dizziness, seizures, light-headedness, numbness and headaches.     Objective:     Vitals:  Temp: 98.3 °F (36.8 °C)  Pulse: (!) 125  Rhythm: sinus tachycardia  BP: (!) 97/54  MAP (mmHg): 70  Resp: 15  SpO2: 97 %    Temp  Min: 94 °F (34.4 °C)  Max: 98.3 °F (36.8 °C)  Pulse  Min: 91  Max: 129  BP  Min: 94/60  Max: 115/62  MAP (mmHg)  Min: 67  Max: 83  Resp  Min: 12  Max: 30  SpO2  Min: 91 %  Max: 100 %    02/02 0701 - 02/03 0700  In: 5071.4 [I.V.:500]  Out: 2520 [Urine:1090; Drains:1030]   Unmeasured Output  Urine Occurrence: 1  Stool Occurrence: 0  Pad Count: 1       Physical Exam  Vitals and nursing note reviewed.   Constitutional:       General: She is not in acute distress.     Appearance: She is obese. She is not ill-appearing, toxic-appearing or diaphoretic.   HENT:      Head: Normocephalic.      Mouth/Throat:      Mouth: Mucous membranes are moist.   Eyes:      General: No scleral icterus.        Right eye: No discharge.         Left eye: No discharge.   Cardiovascular:      Rate and Rhythm: Normal rate and regular rhythm.   Pulmonary:      Effort: Pulmonary effort is normal. No respiratory distress.      Comments: NC in place  Abdominal:      General: Abdomen is flat.      Palpations: Abdomen is soft.      Tenderness: There is no abdominal tenderness.   Musculoskeletal:         General: No deformity.      Cervical back: No rigidity.   Skin:     General: Skin  is warm and dry.      Coloration: Skin is not jaundiced.   Neurological:      Mental Status: She is alert and oriented to person, place, and time. Mental status is at baseline.      Motor: Weakness (Bilateral LE) and tremor (Bilateral UE) present.      Comments:   E4 V5 M6  Awake, alert, and oriented to self, time, place, and situation. Speech fluent. Answering all questions appropriately and following all commands briskly.   PERRL. Extraocular movements grossly intact. No facial asymmetry. Conversational hearing intact.   FOWLER spontaneously and follows commands. Strength 4/5 in BUE & 3/5 in BLE. Sensation intact to light touch in all 4 extremities.    Psychiatric:         Mood and Affect: Mood normal.         Behavior: Behavior normal.     Gait & coordination exams deferred.      Medications:  Continuoushydromorphone in 0.9 % NaCl 6 mg/30 ml  Scheduledacetaminophen, 1,000 mg, TID  buPROPion, 300 mg, Daily  gabapentin, 600 mg, TID  lactulose, 20 g, TID  meropenem (MERREM) IVPB, 2 g, Q8H  methocarbamoL, 1,000 mg, QID  pantoprazole, 40 mg, Daily  polyethylene glycol, 17 g, BID  senna-docusate 8.6-50 mg, 2 tablet, BID  sodium chloride 0.9%, 10 mL, Q6H  PRNsodium chloride, , Q24H PRN  sodium chloride, , Q24H PRN  sodium chloride, , Q24H PRN  sodium chloride, , Q24H PRN  sodium chloride, , Q24H PRN  sodium chloride, , Q24H PRN  sodium chloride, , Q24H PRN  dextrose 10%, 12.5 g, PRN  dextrose 10%, 25 g, PRN  diazePAM, 5 mg, Q6H PRN  glucagon (human recombinant), 1 mg, PRN  glucose, 16 g, PRN  glucose, 24 g, PRN  hydrALAZINE, 10 mg, Q6H PRN  labetalol, 10 mg, Q4H PRN  magnesium hydroxide 400 mg/5 ml, 30 mL, Daily PRN  magnesium oxide, 800 mg, PRN  magnesium oxide, 800 mg, PRN  melatonin, 6 mg, Nightly PRN  naloxone, 0.02 mg, PRN  ondansetron, 4 mg, Q6H PRN  oxyCODONE, 10 mg, Q4H PRN  polyethylene glycol, 17 g, Daily PRN  potassium bicarbonate, 35 mEq, PRN  potassium bicarbonate, 50 mEq, PRN  potassium bicarbonate, 60 mEq,  PRN  potassium, sodium phosphates, 2 packet, PRN  potassium, sodium phosphates, 2 packet, PRN  potassium, sodium phosphates, 2 packet, PRN  prochlorperazine, 2.5 mg, Q6H PRN  sodium chloride 0.9%, 10 mL, Q12H PRN  sodium chloride 0.9%, 10 mL, PRN    Today I personally reviewed pertinent medications, lines/drains/airways, laboratory results, microbiology results, notably:      Laboratory:  CBC:  Recent Labs   Lab 02/03/23 0159   WBC 2.34*   RBC 2.55*   HGB 7.2*   HCT 22.8*   PLT 77*   MCV 89   MCH 28.2   MCHC 31.6*         CMP:  Recent Labs   Lab 02/03/23 0159   CALCIUM 7.6*   PROT 4.6*      K 4.6   CO2 24      BUN 22*   CREATININE 0.5   ALKPHOS 99   ALT 29   AST 48*   BILITOT 4.6*       Microbiology:  Microbiology Results (last 7 days)       Procedure Component Value Units Date/Time    Fungus culture [[id number]] Collected: 01/24/23 0913    Order Status: Completed Specimen: Body Fluid from Back Updated: 02/02/23 0954     Fungus (Mycology) Culture Culture in progress    Narrative:      2) Perispinal    Fungus culture [[id number]] Collected: 01/24/23 0913    Order Status: Completed Specimen: Body Fluid from Back Updated: 02/02/23 0954     Fungus (Mycology) Culture Culture in progress    Narrative:      1) Perispinal    Fungus culture [[id number]] Collected: 01/24/23 0943    Order Status: Completed Specimen: Bone from Back Updated: 02/02/23 0954     Fungus (Mycology) Culture Culture in progress    Narrative:      3) Perispinal    Fungus culture [[id number]] Collected: 01/24/23 0943    Order Status: Completed Specimen: Bone from Back Updated: 02/02/23 0954     Fungus (Mycology) Culture Culture in progress    Narrative:      4) Perispinal    Blood culture [[id number]] Collected: 01/27/23 0120    Order Status: Completed Specimen: Blood from Peripheral, Forearm, Right Updated: 02/01/23 0612     Blood Culture, Routine No growth after 5 days.    Blood culture [[id number]] Collected: 01/27/23 0123    Order Status:  Completed Specimen: Blood from Peripheral, Antecubital, Left Updated: 02/01/23 0612     Blood Culture, Routine No growth after 5 days.    Culture, Anaerobe [[id number]] Collected: 01/24/23 0943    Order Status: Completed Specimen: Bone from Back Updated: 01/30/23 0943     Anaerobic Culture No anaerobes isolated    Narrative:      4) Perispinal    Culture, Anaerobe [[id number]] Collected: 01/24/23 0943    Order Status: Completed Specimen: Bone from Back Updated: 01/30/23 0942     Anaerobic Culture No anaerobes isolated    Narrative:      3) Perispinal    Culture, Anaerobe [[id number]] Collected: 01/24/23 0913    Order Status: Completed Specimen: Body Fluid from Back Updated: 01/30/23 0942     Anaerobic Culture No anaerobes isolated    Narrative:      2) Perispinal    Culture, Anaerobe [[id number]] Collected: 01/24/23 0913    Order Status: Completed Specimen: Body Fluid from Back Updated: 01/30/23 0859     Anaerobic Culture No anaerobes isolated    Narrative:      1) Perispinal    Aerobic culture [[id number]]  (Abnormal)  (Susceptibility) Collected: 01/24/23 0943    Order Status: Completed Specimen: Bone from Back Updated: 01/28/23 1145     Aerobic Bacterial Culture ESCHERICHIA COLI ESBL  From broth only      Narrative:      4) Perispinal              Diet  Diet Adult Regular (IDDSI Level 7)  Diet Adult Regular (IDDSI Level 7)

## 2023-02-03 NOTE — PLAN OF CARE
[facility] Care Plan    POC reviewed with Rachel Zazueta at 0300. Pt verbalized understanding. Questions and concerns addressed. No acute events overnight. Pt progressing toward goals. Will continue to monitor. See below and flowsheets for full assessment and VS info.   -PCA pump in use  -PRN oxy given for pain  -1x dose of IV valium given for agitation/restlessness         Is this a stroke patient? no    Neuro:  Jane Coma Scale  Best Eye Response: 4-->(E4) spontaneous  Best Motor Response: 6-->(M6) obeys commands  Best Verbal Response: 5-->(V5) oriented  Jane Coma Scale Score: 15  Assessment Qualifiers: no eye obstruction present  Pupil PERRLA: yes     24hr Temp:  [94 °F (34.4 °C)-98.2 °F (36.8 °C)]     CV:   Rhythm: sinus tachycardia  BP goals:   SBP < 140  MAP > 65    Resp:      Oxygen Concentration (%): 24    Plan: N/A    GI/:     Diet/Nutrition Received: NPO  Last Bowel Movement: 02/02/23  Voiding Characteristics: urethral catheter (bladder)    Intake/Output Summary (Last 24 hours) at 2/3/2023 0336  Last data filed at 2/3/2023 0303  Gross per 24 hour   Intake 5323.42 ml   Output 2805 ml   Net 2518.42 ml     Unmeasured Output  Urine Occurrence: 1  Stool Occurrence: 0  Pad Count: 1    Labs/Accuchecks:  Recent Labs   Lab 02/03/23  0159   WBC 2.34*   RBC 2.55*   HGB 7.2*   HCT 22.8*   PLT 77*      Recent Labs   Lab 02/03/23  0159      K 4.6   CO2 24      BUN 22*   CREATININE 0.5   ALKPHOS 99   ALT 29   AST 48*   BILITOT 4.6*      Recent Labs   Lab 02/01/23  1113   INR 1.2    No results for input(s): CPK, CPKMB, TROPONINI, MB in the last 168 hours.    Electrolytes: N/A - electrolytes WDL  Accuchecks: none    Gtts:   hydromorphone in 0.9 % NaCl 6 mg/30 ml         LDA/Wounds:  Lines/Drains/Airways       Peripherally Inserted Central Catheter Line  Duration             PICC Double Lumen 01/26/23 1209 left basilic 7 days              Drain  Duration                  Closed/Suction Drain 01/24/23  Posterior Back Accordion 10 Fr. 10 days         Closed/Suction Drain 01/24/23 Posterior;Right Back Accordion 10 Fr. 10 days    Female External Urinary Catheter 01/27/23 1115 6 days         Closed/Suction Drain 02/02/23 1401 Right Back Bulb 15 Fr. <1 day         Closed/Suction Drain 02/02/23 1402 Left Back Bulb 15 Fr. <1 day         Closed/Suction Drain 02/02/23 1402 Left Back Bulb 15 Fr. <1 day         Closed/Suction Drain 02/02/23 1402 Right Back Bulb 15 Fr. <1 day         Urethral Catheter 02/02/23 0853 Non-latex;Straight-tip 16 Fr. <1 day              Arterial Line  Duration             Arterial Line 02/02/23 1206 Right Radial <1 day              Peripheral Intravenous Line  Duration                  Midline Catheter Insertion/Assessment  - Single Lumen 01/12/23 2138 Right basilic vein (medial side of arm) 18g x 10cm 21 days         Peripheral IV - Single Lumen 02/02/23 0900 20 G Left Hand <1 day                  Wounds: Yes  Wound care consulted: No

## 2023-02-03 NOTE — ASSESSMENT & PLAN NOTE
41 yo F w/ history of multiple spinal surgeries presented to OSH with possible infection of patient's shoulder and found to have UTI and E. Coli bacteremia and progressively worse B/L LE weakness. [place] [facility] [place]. Underwent Laminectomy T8-T10; Posterior spinal fusion T8-T12; hardware removal and washout. Admitted to [facility] for HLOC. Arrived to unit on PCA dilaudid pump. Patient is HDS on 3L NC.   - Admit to NSCCU  - NSGY, ID following  - q2hr neuro checks, vitals, I/Os  - HV drains x 2 in place, management per NSGY  -Transfuse for Hg<7  - Cultures positive for ESBL, blood cultures with NGTD. WCTM  - Antibiotics per ID recs  - CMP, Mag, Phos, CBC daily  - MAP goals > 65, SBP < 180  - PRN labetalol, hydralazine  - MM pain regimen; PCA Dilaudid pump for pain management  - Patient will need to lie flat with HOB 15-20°   - Aggressive bowel regimen  - PT/OT as appropriate   - VTE prophylaxis: mechanical, hold chemical given pancytopenia  - post operative management - POD#1  - f/u with nsgy regarding post-operative positional and mobility restrictions

## 2023-02-03 NOTE — PROGRESS NOTES
[facility] - Neuro Critical Care  Neurocritical Care  Progress Note    Admit Date: 1/19/2023  Service Date: 02/03/2023  Length of Stay: 15    Subjective:     Chief Complaint: Weakness of both lower extremities    History of Present Illness: 42-year-old female with a history of IV drug use, chronic hep C with cirrhosis, spinal fusion in April 2021, complicated by epidural abscess with removal of hardware February 2022 and against spinal fusion in March 2022 of T10 through the pelvis as well as neurogenic bladder who initially presented to [facility] in late December for generalized weakness, fatigue and bilateral flank pain.  Her course at [facility] was complicated by a E coli bacteremia thought to be due to a urinary source, she is completed a 7 day course of meropenem on 1/3, and had bcx from 1/2 that did not grow any bacteria.       She also had an ICU admission for ongoing back spasms and agitation requiring Precedex.  In early January patient started developing progressive lower extremity weakness greater in the left compared to the right.  She also had an MRI at the outside hospital that showed some white matter changes concerning for possible demyelinating disease.  However her thoracic spine radiographs also showed worsening of her kyphosis at T9-T10.  Due to the concern for either a demyelinating polyneuropathy, MS, or spinal cord compression the patient was transferred to [facility] for neurosurgical evaluation.       On admission the patient had a CT lumbar and thoracic spine and an MRI CTL spine. The MRI showed  focal kyphosis T8 through T10, with possible associated cord edema signal.  As well as extensive edema throughout the posterior paraspinal musculature concerning for possible infectious or inflammatory process.  There was concern that the findings at T8 through T10 could correspond with compressive myelopathy.  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  There were also erosive changes involving the sacroiliac joints bilaterally which was concerning for sacroiliitis and could be infectious.     Patient underwent laminectomy T8-T10; posterior fusion T8-T12; and washout by NSGY today. No complications noted during surgery. Patient is HDS and on 3L NC. Patient is currently on a PCA dilaudid pump.            Hospital Course: 1/25/2023 NAEO, pain well controlled on current regimen. Continue ABX. Maintain logroll precautions and HOB <30 until second stage of surgery.  01/26/2023 Hemoglobin 6.6, repeat 6.2. Increased drainage noted from bilateral hemovac. S/p 1u pRBC. PICC team consulted for long term access for antibiotics, remove IJ central line when PICC placed.   01/27/2023: NAEON. HV to gravity with decreased drainge. Hgb 7.2 this AM, will trend CBC q12h. Lytes replaced. Bowel regimen adjusted.   01/28/2023: NAEON. Received 1 unit PBRC yesterday. Hgb 7.4 this AM. Plan for OR 2/2.   01/29/2023: NAEON. HV drainage decreasing. Hgb stable. Plan for OR 2/2  01/30/2023  Stable exam. Significant solid BM's  01/31/2023  Exam stable, patient in much better spirits today  02/01/2023  Exam stable  02/02/2023  Post-operatively heavily sedated. Oral airway in place  02/03/2023  Doing very well post operatively. Advance care as appropriate      Interval History:  See hospital course above.     Review of Systems   Constitutional:  Negative for fever.   HENT:  Negative for congestion and rhinorrhea.    Eyes:  Negative for visual disturbance.   Respiratory:  Negative for shortness of breath.    Cardiovascular:  Negative for chest pain.   Gastrointestinal:  Negative for abdominal distention, abdominal pain, nausea and vomiting.   Genitourinary:  Negative for dysuria and hematuria.   Musculoskeletal:  Positive for back pain. Negative for neck pain.   Neurological:  Positive for weakness. Negative for dizziness,  seizures, light-headedness, numbness and headaches.     Objective:     Vitals:  Temp: 98.3 °F (36.8 °C)  Pulse: (!) 125  Rhythm: sinus tachycardia  BP: (!) 97/54  MAP (mmHg): 70  Resp: 15  SpO2: 97 %    Temp  Min: 94 °F (34.4 °C)  Max: 98.3 °F (36.8 °C)  Pulse  Min: 91  Max: 129  BP  Min: 94/60  Max: 115/62  MAP (mmHg)  Min: 67  Max: 83  Resp  Min: 12  Max: 30  SpO2  Min: 91 %  Max: 100 %    02/02 0701 - 02/03 0700  In: 5071.4 [I.V.:500]  Out: 2520 [Urine:1090; Drains:1030]   Unmeasured Output  Urine Occurrence: 1  Stool Occurrence: 0  Pad Count: 1       Physical Exam  Vitals and nursing note reviewed.   Constitutional:       General: She is not in acute distress.     Appearance: She is obese. She is not ill-appearing, toxic-appearing or diaphoretic.   HENT:      Head: Normocephalic.      Mouth/Throat:      Mouth: Mucous membranes are moist.   Eyes:      General: No scleral icterus.        Right eye: No discharge.         Left eye: No discharge.   Cardiovascular:      Rate and Rhythm: Normal rate and regular rhythm.   Pulmonary:      Effort: Pulmonary effort is normal. No respiratory distress.      Comments: NC in place  Abdominal:      General: Abdomen is flat.      Palpations: Abdomen is soft.      Tenderness: There is no abdominal tenderness.   Musculoskeletal:         General: No deformity.      Cervical back: No rigidity.   Skin:     General: Skin is warm and dry.      Coloration: Skin is not jaundiced.   Neurological:      Mental Status: She is alert and oriented to person, place, and time. Mental status is at baseline.      Motor: Weakness (Bilateral LE) and tremor (Bilateral UE) present.      Comments:   E4 V5 M6  Awake, alert, and oriented to self, time, place, and situation. Speech fluent. Answering all questions appropriately and following all commands briskly.   PERRL. Extraocular movements grossly intact. No facial asymmetry. Conversational hearing intact.   FOWLER spontaneously and follows commands.  Strength 4/5 in BUE & 3/5 in BLE. Sensation intact to light touch in all 4 extremities.    Psychiatric:         Mood and Affect: Mood normal.         Behavior: Behavior normal.     Gait & coordination exams deferred.      Medications:  Continuoushydromorphone in 0.9 % NaCl 6 mg/30 ml  Scheduledacetaminophen, 1,000 mg, TID  buPROPion, 300 mg, Daily  gabapentin, 600 mg, TID  lactulose, 20 g, TID  meropenem (MERREM) IVPB, 2 g, Q8H  methocarbamoL, 1,000 mg, QID  pantoprazole, 40 mg, Daily  polyethylene glycol, 17 g, BID  senna-docusate 8.6-50 mg, 2 tablet, BID  sodium chloride 0.9%, 10 mL, Q6H  PRNsodium chloride, , Q24H PRN  sodium chloride, , Q24H PRN  sodium chloride, , Q24H PRN  sodium chloride, , Q24H PRN  sodium chloride, , Q24H PRN  sodium chloride, , Q24H PRN  sodium chloride, , Q24H PRN  dextrose 10%, 12.5 g, PRN  dextrose 10%, 25 g, PRN  diazePAM, 5 mg, Q6H PRN  glucagon (human recombinant), 1 mg, PRN  glucose, 16 g, PRN  glucose, 24 g, PRN  hydrALAZINE, 10 mg, Q6H PRN  labetalol, 10 mg, Q4H PRN  magnesium hydroxide 400 mg/5 ml, 30 mL, Daily PRN  magnesium oxide, 800 mg, PRN  magnesium oxide, 800 mg, PRN  melatonin, 6 mg, Nightly PRN  naloxone, 0.02 mg, PRN  ondansetron, 4 mg, Q6H PRN  oxyCODONE, 10 mg, Q4H PRN  polyethylene glycol, 17 g, Daily PRN  potassium bicarbonate, 35 mEq, PRN  potassium bicarbonate, 50 mEq, PRN  potassium bicarbonate, 60 mEq, PRN  potassium, sodium phosphates, 2 packet, PRN  potassium, sodium phosphates, 2 packet, PRN  potassium, sodium phosphates, 2 packet, PRN  prochlorperazine, 2.5 mg, Q6H PRN  sodium chloride 0.9%, 10 mL, Q12H PRN  sodium chloride 0.9%, 10 mL, PRN    Today I personally reviewed pertinent medications, lines/drains/airways, laboratory results, microbiology results, notably:      Laboratory:  CBC:  Recent Labs   Lab 02/03/23 0159   WBC 2.34*   RBC 2.55*   HGB 7.2*   HCT 22.8*   PLT 77*   MCV 89   MCH 28.2   MCHC 31.6*         CMP:  Recent Labs   Lab 02/03/23 0159    CALCIUM 7.6*   PROT 4.6*      K 4.6   CO2 24      BUN 22*   CREATININE 0.5   ALKPHOS 99   ALT 29   AST 48*   BILITOT 4.6*       Microbiology:  Microbiology Results (last 7 days)       Procedure Component Value Units Date/Time    Fungus culture [[id number]] Collected: 01/24/23 0913    Order Status: Completed Specimen: Body Fluid from Back Updated: 02/02/23 0954     Fungus (Mycology) Culture Culture in progress    Narrative:      2) Perispinal    Fungus culture [[id number]] Collected: 01/24/23 0913    Order Status: Completed Specimen: Body Fluid from Back Updated: 02/02/23 0954     Fungus (Mycology) Culture Culture in progress    Narrative:      1) Perispinal    Fungus culture [[id number]] Collected: 01/24/23 0943    Order Status: Completed Specimen: Bone from Back Updated: 02/02/23 0954     Fungus (Mycology) Culture Culture in progress    Narrative:      3) Perispinal    Fungus culture [[id number]] Collected: 01/24/23 0943    Order Status: Completed Specimen: Bone from Back Updated: 02/02/23 0954     Fungus (Mycology) Culture Culture in progress    Narrative:      4) Perispinal    Blood culture [[id number]] Collected: 01/27/23 0120    Order Status: Completed Specimen: Blood from Peripheral, Forearm, Right Updated: 02/01/23 0612     Blood Culture, Routine No growth after 5 days.    Blood culture [[id number]] Collected: 01/27/23 0123    Order Status: Completed Specimen: Blood from Peripheral, Antecubital, Left Updated: 02/01/23 0612     Blood Culture, Routine No growth after 5 days.    Culture, Anaerobe [[id number]] Collected: 01/24/23 0943    Order Status: Completed Specimen: Bone from Back Updated: 01/30/23 0943     Anaerobic Culture No anaerobes isolated    Narrative:      4) Perispinal    Culture, Anaerobe [[id number]] Collected: 01/24/23 0943    Order Status: Completed Specimen: Bone from Back Updated: 01/30/23 0942     Anaerobic Culture No anaerobes isolated    Narrative:      3) Perispinal    Culture,  Anaerobe [[id number]] Collected: 01/24/23 0913    Order Status: Completed Specimen: Body Fluid from Back Updated: 01/30/23 0942     Anaerobic Culture No anaerobes isolated    Narrative:      2) Perispinal    Culture, Anaerobe [[id number]] Collected: 01/24/23 0913    Order Status: Completed Specimen: Body Fluid from Back Updated: 01/30/23 0859     Anaerobic Culture No anaerobes isolated    Narrative:      1) Perispinal    Aerobic culture [[id number]]  (Abnormal)  (Susceptibility) Collected: 01/24/23 0943    Order Status: Completed Specimen: Bone from Back Updated: 01/28/23 1145     Aerobic Bacterial Culture ESCHERICHIA COLI ESBL  From broth only      Narrative:      4) Perispinal              Diet  Diet Adult Regular (IDDSI Level 7)  Diet Adult Regular (IDDSI Level 7)        Assessment/Plan:     Psychiatric  Anxiety and depression  Continue Buproprion for depression  Continue Lorazepam for anxiety  Continue Gabapentin for neuropathy and anxiety    ID  Thoracic discitis  CT T/L spine thin cut 1/20 with haloing of pelvic and T10 screws, spondylodiscitis T8-9, T9-10  CT C spine with concern for discitis at C6-7  Patient is currently afebrile with chronic pancytopenia  Previously on Amoxicillin up until surgery for chronic suppressive therapy.  ID following  Blood cx (1/29) NGTD  Intra-operative cultures with E.coli ESBL  Continue Vancomycin and meropenem  Trend CBC daily  See above    GI  Chronic hepatitis C with cirrhosis  Hx of Hep C. See Cirrhosis of Liver    Cirrhosis of liver with ascites  Patient has reportedly refused transplant evaluation in the past.   -Daily CMP and trend LFTs  -Continue Lactulose 20g TID   -Will need GI/hepatology follow up outpatient    MELD-Na score: 15 at 2/3/2023  1:59 AM  MELD score: 14 at 2/3/2023  1:59 AM  Calculated from:  Serum Creatinine: 0.5 mg/dL (Using min of 1 mg/dL) at 2/3/2023  1:59 AM  Serum Sodium: 136 mmol/L at 2/3/2023  1:59 AM  Total Bilirubin: 4.6 mg/dL at 2/3/2023  1:59  AM  INR(ratio): 1.2 at 2/1/2023 11:13 AM  Age: 42 years    Orthopedic  * Weakness of both lower extremities  41 yo F w/ history of multiple spinal surgeries presented to OSH with possible infection of patient's shoulder and found to have UTI and E. Coli bacteremia and progressively worse B/L LE weakness. Transferred to [facility] for neurosurgical evaluation. Underwent Laminectomy T8-T10; Posterior spinal fusion T8-T12; hardware removal and washout. Admitted to [facility] for HLOC. Arrived to unit on PCA dilaudid pump. Patient is HDS on 3L NC.   - Admit to NSCCU  - NSGY, ID following  - q2hr neuro checks, vitals, I/Os  - HV drains x 2 in place, management per NSGY  -Transfuse for Hg<7  - Cultures positive for ESBL, blood cultures with NGTD. WCTM  - Antibiotics per ID recs  - CMP, Mag, Phos, CBC daily  - MAP goals > 65, SBP < 180  - PRN labetalol, hydralazine  - MM pain regimen; PCA Dilaudid pump for pain management  - Patient will need to lie flat with HOB 15-20°   - Aggressive bowel regimen  - PT/OT as appropriate   - VTE prophylaxis: mechanical, hold chemical given pancytopenia  - post operative management - POD#1  - f/u with nsgy regarding post-operative positional and mobility restrictions          The patient is being Prophylaxed for:  Venous Thromboembolism with: Mechanical  Stress Ulcer with: PPI  Ventilator Pneumonia with: not applicable    Activity Orders          Diet Adult Regular (IDDSI Level 7): Regular starting at 02/03 1024    Up in chair With meals starting at 02/02 1700    Progressive Mobility Protocol (mobilize patient to their highest level of functioning at least twice daily) starting at 02/02 1356    Elevate HOB 30 starting at 02/02 1356    Ambulate With Assistance, Post Op Day 0 If the patient arrives from PACU by 4PM, walk at least once on day of operation if alert and safe to do so. starting at 02/02 1355    Turn patient starting at 01/24 1800    Up in chair With meals starting at 01/24 1700    Progressive  Mobility Protocol (mobilize patient to their highest level of functioning at least twice daily) starting at 01/24 1105    Elevate HOB 30 starting at 01/24 1105    Ambulate With Assistance, Post Op Day 0 If the patient arrives from PACU by 4PM, walk at least once on day of operation if alert and safe to do so. starting at 01/24 1104        Partial Code    Ace Rowley MD  Neurocritical Care  [facility] - Neuro Critical Care    Level 3 of hospital care time was spent personally by me on the following activities: development of treatment plan with patient or surrogate and bedside caregivers, discussions with consultants, evaluation of patient's response to treatment, examination of patient, ordering and performing treatments and interventions, ordering and review of laboratory studies, ordering and review of radiographic studies, pulse oximetry, antibiotic titration if applicable, vasopressor titration if applicable, re-evaluation of patient's condition.

## 2023-02-03 NOTE — PROGRESS NOTES
[facility] - Neuro Critical Care  Neurocritical Care  Progress Note    Admit Date: 1/19/2023  Service Date: 02/02/2023  Length of Stay: 14    Subjective:     Chief Complaint: Weakness of both lower extremities    History of Present Illness: 42-year-old female with a history of IV drug use, chronic hep C with cirrhosis, spinal fusion in April 2021, complicated by epidural abscess with removal of hardware February 2022 and against spinal fusion in March 2022 of T10 through the pelvis as well as neurogenic bladder who initially presented to [facility] in late December for generalized weakness, fatigue and bilateral flank pain.  Her course at [facility] was complicated by a E coli bacteremia thought to be due to a urinary source, she is completed a 7 day course of meropenem on 1/3, and had bcx from 1/2 that did not grow any bacteria.       She also had an ICU admission for ongoing back spasms and agitation requiring Precedex.  In early January patient started developing progressive lower extremity weakness greater in the left compared to the right.  She also had an MRI at the outside hospital that showed some white matter changes concerning for possible demyelinating disease.  However her thoracic spine radiographs also showed worsening of her kyphosis at T9-T10.  Due to the concern for either a demyelinating polyneuropathy, MS, or spinal cord compression the patient was transferred to [facility] for neurosurgical evaluation.       On admission the patient had a CT lumbar and thoracic spine and an MRI CTL spine. The MRI showed  focal kyphosis T8 through T10, with possible associated cord edema signal.  As well as extensive edema throughout the posterior paraspinal musculature concerning for possible infectious or inflammatory process.  There was concern that the findings at T8 through T10 could correspond with compressive myelopathy.  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  There were also erosive changes involving the sacroiliac joints bilaterally which was concerning for sacroiliitis and could be infectious.     Patient underwent laminectomy T8-T10; posterior fusion T8-T12; and washout by NSGY today. No complications noted during surgery. Patient is HDS and on 3L NC. Patient is currently on a PCA dilaudid pump.            Hospital Course: 1/25/2023 NAEO, pain well controlled on current regimen. Continue ABX. Maintain logroll precautions and HOB <30 until second stage of surgery.  01/26/2023 Hemoglobin 6.6, repeat 6.2. Increased drainage noted from bilateral hemovac. S/p 1u pRBC. PICC team consulted for long term access for antibiotics, remove IJ central line when PICC placed.   01/27/2023: NAEON. HV to gravity with decreased drainge. Hgb 7.2 this AM, will trend CBC q12h. Lytes replaced. Bowel regimen adjusted.   01/28/2023: NAEON. Received 1 unit PBRC yesterday. Hgb 7.4 this AM. Plan for OR 2/2.   01/29/2023: NAEON. HV drainage decreasing. Hgb stable. Plan for OR 2/2  01/30/2023  Stable exam. Significant solid BM's  01/31/2023  Exam stable, patient in much better spirits today  02/01/2023  Exam stable  02/02/2023  Post-operatively heavily sedated. Oral airway in place      Interval History:  see hospital course    Review of Systems  Unable to obtain a complete ROS due to level of consciousness.  Objective:     Vitals:  Temp: (!) 94 °F (34.4 °C)  Pulse: 95  Rhythm: sinus tachycardia  BP: (!) 101/56  MAP (mmHg): 75  Resp: (!) 24  SpO2: 100 %    Temp  Min: 94 °F (34.4 °C)  Max: 98.2 °F (36.8 °C)  Pulse  Min: 94  Max: 119  BP  Min: 87/50  Max: 116/78  MAP (mmHg)  Min: 65  Max: 81  Resp  Min: 17  Max: 46  SpO2  Min: 89 %  Max: 100 %    02/01 0701 - 02/02 0700  In: 2156.1 [P.O.:350; I.V.:85]  Out: 1990 [Urine:1350; Drains:640]   Unmeasured Output  Urine Occurrence: 1  Stool Occurrence: 0  Pad Count: 1        Physical Exam  Physical Exam:  GA: lethargic, comfortable, no acute distress.   HEENT: No scleral icterus  Pulmonary: normal effort, oral airway in place  Cardiac: regular rate  Abdominal: non-tender  Skin: No grossly noticeable rashes    Neuro:  --sedation: post op anesthesia  --GCS: E2V2M5  --Mental Status:heavily sedated post operatively    --CN II-XII grossly intact as best as can be examined, L disconjugate gaze noted previously  --Pupils 3mm, PERRL.   --brainstem: intact as best as can be observed  --Motor: minimal wd throughout but heavily sedated  --Deep tendon Reflexes: not tested  --Gait: deferred      Medications:  Continuoushydromorphone in 0.9 % NaCl 6 mg/30 ml, Last Rate: Stopped (02/02/23 0828)    Scheduledacetaminophen, 1,000 mg, TID  buPROPion, 300 mg, Daily  gabapentin, 600 mg, TID  lactulose, 20 g, TID  meropenem (MERREM) IVPB, 2 g, Q8H  methocarbamoL, 1,000 mg, QID  pantoprazole, 40 mg, Daily  polyethylene glycol, 17 g, BID  senna-docusate 8.6-50 mg, 2 tablet, BID  sodium chloride 0.9%, 10 mL, Q6H    PRNsodium chloride, , Q24H PRN  sodium chloride, , Q24H PRN  sodium chloride, , Q24H PRN  sodium chloride, , Q24H PRN  sodium chloride, , Q24H PRN  sodium chloride, , Q24H PRN  sodium chloride, , Q24H PRN  dextrose 10%, 12.5 g, PRN  dextrose 10%, 25 g, PRN  diazePAM, 5 mg, Q6H PRN  glucagon (human recombinant), 1 mg, PRN  glucose, 16 g, PRN  glucose, 24 g, PRN  hydrALAZINE, 10 mg, Q6H PRN  labetalol, 10 mg, Q4H PRN  magnesium hydroxide 400 mg/5 ml, 30 mL, Daily PRN  magnesium oxide, 800 mg, PRN  magnesium oxide, 800 mg, PRN  melatonin, 6 mg, Nightly PRN  naloxone, 0.02 mg, PRN  ondansetron, 4 mg, Q6H PRN  oxyCODONE, 10 mg, Q4H PRN  polyethylene glycol, 17 g, Daily PRN  potassium bicarbonate, 35 mEq, PRN  potassium bicarbonate, 50 mEq, PRN  potassium bicarbonate, 60 mEq, PRN  potassium, sodium phosphates, 2 packet, PRN  potassium, sodium phosphates, 2 packet, PRN  potassium, sodium phosphates, 2  packet, PRN  prochlorperazine, 2.5 mg, Q6H PRN  sodium chloride 0.9%, 10 mL, Q12H PRN  sodium chloride 0.9%, 10 mL, PRN        Diet  Diet NPO Except for: Medication, Sips with Medication  Diet NPO Except for: Medication, Sips with Medication          Assessment/Plan:     Psychiatric  Anxiety and depression  Continue Buproprion for depression  Continue Lorazepam for anxiety  Continue Gabapentin for neuropathy and anxiety    Substance use disorder  Denies IV drug use (meth) for past 3 years.  Denies alcohol and tobacco abuse.       ID  Thoracic discitis  CT T/L spine thin cut 1/20 with haloing of pelvic and T10 screws, spondylodiscitis T8-9, T9-10  CT C spine with concern for discitis at C6-7  Patient is currently afebrile with chronic pancytopenia  Previously on Amoxicillin up until surgery for chronic suppressive therapy.  ID following  Blood cx (1/29) NGTD  Intra-operative cultures with E.coli ESBL  Continue Vancomycin and meropenem  Trend CBC daily  See above    GI  Chronic hepatitis C with cirrhosis  Hx of Hep C. See Cirrhosis of Liver    Cirrhosis of liver with ascites  Patient has reportedly refused transplant evaluation in the past.   -Daily CMP and trend LFTs  -Continue Lactulose 20g TID   -Will need GI/hepatology follow up outpatient    MELD-Na score: 10 at 2/2/2023  3:46 AM  MELD score: 10 at 2/2/2023  3:46 AM  Calculated from:  Serum Creatinine: 0.5 mg/dL (Using min of 1 mg/dL) at 2/2/2023  3:46 AM  Serum Sodium: 136 mmol/L at 2/2/2023  3:46 AM  Total Bilirubin: 1.5 mg/dL at 2/2/2023  3:46 AM  INR(ratio): 1.2 at 2/1/2023 11:13 AM  Age: 42 years    Orthopedic  * Weakness of both lower extremities  43 yo F w/ history of multiple spinal surgeries presented to OSH with possible infection of patient's shoulder and found to have UTI and E. Coli bacteremia and progressively worse B/L LE weakness. [place] [facility] [place] Underwent Laminectomy T8-T10; Posterior spinal fusion T8-T12;  hardware removal and washout. Admitted to [facility] for HLOC. Arrived to unit on PCA dilaudid pump. Patient is HDS on 3L NC.   - Admit to NSCCU  - NSGY, ID following  - q2hr neuro checks, vitals, I/Os  - HV drains x 2 in place, management per NSGY  -Transfuse for Hg<7  - Cultures positive for ESBL, blood cultures with NGTD. WCTM  - Antibiotics per ID recs  - CMP, Mag, Phos, CBC daily  - MAP goals > 65, SBP < 180  - PRN labetalol, hydralazine  - MM pain regimen; PCA Dilaudid pump for pain management  - Patient will need to lie flat with HOB 15-20°   - Aggressive bowel regimen  - PT/OT as appropriate   - VTE prophylaxis: mechanical, hold chemical given pancytopenia  - post operative management - POD#0          The patient is being Prophylaxed for:  Venous Thromboembolism with: Mechanical  Stress Ulcer with: PPI  Ventilator Pneumonia with: not applicable    Activity Orders          Up in chair With meals starting at 02/02 1700    Progressive Mobility Protocol (mobilize patient to their highest level of functioning at least twice daily) starting at 02/02 1356    Elevate HOB 30 starting at 02/02 1356    Ambulate With Assistance, Post Op Day 0 If the patient arrives from PACU by 4PM, walk at least once on day of operation if alert and safe to do so. starting at 02/02 1355    Diet NPO Except for: Medication, Sips with Medication: NPO starting at 02/02 0001    Turn patient starting at 01/24 1800    Up in chair With meals starting at 01/24 1700    Progressive Mobility Protocol (mobilize patient to their highest level of functioning at least twice daily) starting at 01/24 1105    Elevate HOB 30 starting at 01/24 1105    Ambulate With Assistance, Post Op Day 0 If the patient arrives from PACU by 4PM, walk at least once on day of operation if alert and safe to do so. starting at 01/24 1104        Partial Code    Ace Rowley MD  Neurocritical Care  [facility] - Neuro Critical Care    Level 3 of hospital care time was spent  personally by me on the following activities: development of treatment plan with patient or surrogate and bedside caregivers, discussions with consultants, evaluation of patient's response to treatment, examination of patient, ordering and performing treatments and interventions, ordering and review of laboratory studies, ordering and review of radiographic studies, pulse oximetry, antibiotic titration if applicable, vasopressor titration if applicable, re-evaluation of patient's condition.

## 2023-02-03 NOTE — ASSESSMENT & PLAN NOTE
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 [place] [facility] [place] was [place]    OR yesterday for temporary T6-12 PSIF. Taz pus noted intraoperatively.   Given intraoperative findings, new operative plan for T4-pelvis revision and extension of fusion with T9-10 corpectomy with plastic surgery assistance planned for 2/2/23    Recommend to  give platelet transfusion today,     Plan:  --Admitted to ICU, Keep in ICU on logroll precautions until stage 2 Thursday.    -- MRI C/T/L spine 1/20 with C7 SP enhancement, focal kyphosis at T8-10 with severe anterior height loss at T9, enhancing C6 inferior endplate Schmorl's node  -- CT T/L spine thin cut 1/20 with haloing of pelvic and T10 screws, spondylodiscitis T8-9, T9-10  -- CT C spine with concern for discitis at C6-7  -- flex ext XR done, limited view of C6-7 in swimmers view 2/2 shoulders  -- 1/20 ESR 70, CRP 9.4. elevated from prior   -- TLSO at bedside  --HV x2 R to gravity, L FS  --ID: merropenam   -- BCx 1/20: NGTD   -- OR cultures 1/24: E. coli  --OR 2/2/23 as above   -- NPO 2/1/23 midnight   -- platelets > 100k   -- multimodal pain control per primary medicine team  --NSGY will continue to follow. Please contact team with any further questions.

## 2023-02-04 LAB
ALBUMIN SERPL BCP-MCNC: 2 G/DL (ref 3.5–5.2)
ALP SERPL-CCNC: 104 U/L (ref 55–135)
ALT SERPL W/O P-5'-P-CCNC: 29 U/L (ref 10–44)
ANION GAP SERPL CALC-SCNC: 7 MMOL/L (ref 8–16)
AST SERPL-CCNC: 45 U/L (ref 10–40)
BASOPHILS # BLD AUTO: 0.09 K/UL (ref 0–0.2)
BASOPHILS NFR BLD: 0.7 % (ref 0–1.9)
BILIRUB SERPL-MCNC: 2.1 MG/DL (ref 0.1–1)
BUN SERPL-MCNC: 29 MG/DL (ref 6–20)
CALCIUM SERPL-MCNC: 7.6 MG/DL (ref 8.7–10.5)
CHLORIDE SERPL-SCNC: 104 MMOL/L (ref 95–110)
CO2 SERPL-SCNC: 24 MMOL/L (ref 23–29)
CREAT SERPL-MCNC: 0.4 MG/DL (ref 0.5–1.4)
DIFFERENTIAL METHOD: ABNORMAL
EOSINOPHIL # BLD AUTO: 0.4 K/UL (ref 0–0.5)
EOSINOPHIL NFR BLD: 2.8 % (ref 0–8)
ERYTHROCYTE [DISTWIDTH] IN BLOOD BY AUTOMATED COUNT: 19.9 % (ref 11.5–14.5)
EST. GFR  (NO RACE VARIABLE): >60 ML/MIN/1.73 M^2
GLUCOSE SERPL-MCNC: 101 MG/DL (ref 70–110)
HCT VFR BLD AUTO: 24.1 % (ref 37–48.5)
HGB BLD-MCNC: 7.5 G/DL (ref 12–16)
IMM GRANULOCYTES # BLD AUTO: 0.08 K/UL (ref 0–0.04)
IMM GRANULOCYTES NFR BLD AUTO: 0.6 % (ref 0–0.5)
LYMPHOCYTES # BLD AUTO: 1.9 K/UL (ref 1–4.8)
LYMPHOCYTES NFR BLD: 15.2 % (ref 18–48)
MAGNESIUM SERPL-MCNC: 1.9 MG/DL (ref 1.6–2.6)
MCH RBC QN AUTO: 28.6 PG (ref 27–31)
MCHC RBC AUTO-ENTMCNC: 31.1 G/DL (ref 32–36)
MCV RBC AUTO: 92 FL (ref 82–98)
MONOCYTES # BLD AUTO: 1.8 K/UL (ref 0.3–1)
MONOCYTES NFR BLD: 14.7 % (ref 4–15)
NEUTROPHILS # BLD AUTO: 8.2 K/UL (ref 1.8–7.7)
NEUTROPHILS NFR BLD: 66 % (ref 38–73)
NRBC BLD-RTO: 0 /100 WBC
PHOSPHATE SERPL-MCNC: 2.1 MG/DL (ref 2.7–4.5)
PLATELET # BLD AUTO: 196 K/UL (ref 150–450)
PMV BLD AUTO: 9.7 FL (ref 9.2–12.9)
POTASSIUM SERPL-SCNC: 3.7 MMOL/L (ref 3.5–5.1)
PROT SERPL-MCNC: 4.7 G/DL (ref 6–8.4)
RBC # BLD AUTO: 2.62 M/UL (ref 4–5.4)
SODIUM SERPL-SCNC: 135 MMOL/L (ref 136–145)
WBC # BLD AUTO: 12.44 K/UL (ref 3.9–12.7)

## 2023-02-04 PROCEDURE — 63600175 PHARM REV CODE 636 W HCPCS: Performed by: NURSE PRACTITIONER

## 2023-02-04 PROCEDURE — 20000000 HC ICU ROOM

## 2023-02-04 PROCEDURE — 63600175 PHARM REV CODE 636 W HCPCS

## 2023-02-04 PROCEDURE — 27000207 HC ISOLATION

## 2023-02-04 PROCEDURE — 94761 N-INVAS EAR/PLS OXIMETRY MLT: CPT

## 2023-02-04 PROCEDURE — 25000003 PHARM REV CODE 250: Performed by: NURSE PRACTITIONER

## 2023-02-04 PROCEDURE — 25000003 PHARM REV CODE 250: Performed by: PSYCHIATRY & NEUROLOGY

## 2023-02-04 PROCEDURE — 99291 PR CRITICAL CARE, E/M 30-74 MINUTES: ICD-10-PCS | Mod: ,,, | Performed by: PSYCHIATRY & NEUROLOGY

## 2023-02-04 PROCEDURE — 25000003 PHARM REV CODE 250

## 2023-02-04 PROCEDURE — 27000221 HC OXYGEN, UP TO 24 HOURS

## 2023-02-04 PROCEDURE — 25000003 PHARM REV CODE 250: Performed by: STUDENT IN AN ORGANIZED HEALTH CARE EDUCATION/TRAINING PROGRAM

## 2023-02-04 PROCEDURE — 84100 ASSAY OF PHOSPHORUS: CPT | Performed by: STUDENT IN AN ORGANIZED HEALTH CARE EDUCATION/TRAINING PROGRAM

## 2023-02-04 PROCEDURE — 83735 ASSAY OF MAGNESIUM: CPT | Performed by: STUDENT IN AN ORGANIZED HEALTH CARE EDUCATION/TRAINING PROGRAM

## 2023-02-04 PROCEDURE — 25000003 PHARM REV CODE 250: Performed by: INTERNAL MEDICINE

## 2023-02-04 PROCEDURE — 63600175 PHARM REV CODE 636 W HCPCS: Performed by: STUDENT IN AN ORGANIZED HEALTH CARE EDUCATION/TRAINING PROGRAM

## 2023-02-04 PROCEDURE — 99291 CRITICAL CARE FIRST HOUR: CPT | Mod: ,,, | Performed by: PSYCHIATRY & NEUROLOGY

## 2023-02-04 PROCEDURE — A4216 STERILE WATER/SALINE, 10 ML: HCPCS | Performed by: PSYCHIATRY & NEUROLOGY

## 2023-02-04 PROCEDURE — 80053 COMPREHEN METABOLIC PANEL: CPT | Performed by: STUDENT IN AN ORGANIZED HEALTH CARE EDUCATION/TRAINING PROGRAM

## 2023-02-04 PROCEDURE — 94799 UNLISTED PULMONARY SVC/PX: CPT

## 2023-02-04 PROCEDURE — 85025 COMPLETE CBC W/AUTO DIFF WBC: CPT

## 2023-02-04 RX ORDER — HEPARIN SODIUM 5000 [USP'U]/ML
5000 INJECTION, SOLUTION INTRAVENOUS; SUBCUTANEOUS EVERY 8 HOURS
Status: DISCONTINUED | OUTPATIENT
Start: 2023-02-04 | End: 2023-02-26

## 2023-02-04 RX ADMIN — PANTOPRAZOLE SODIUM 40 MG: 40 TABLET, DELAYED RELEASE ORAL at 09:02

## 2023-02-04 RX ADMIN — SENNOSIDES AND DOCUSATE SODIUM 2 TABLET: 50; 8.6 TABLET ORAL at 09:02

## 2023-02-04 RX ADMIN — ACETAMINOPHEN 1000 MG: 500 TABLET ORAL at 03:02

## 2023-02-04 RX ADMIN — OXYCODONE HYDROCHLORIDE 10 MG: 10 TABLET ORAL at 03:02

## 2023-02-04 RX ADMIN — DIAZEPAM 5 MG: 5 TABLET ORAL at 09:02

## 2023-02-04 RX ADMIN — HEPARIN SODIUM 5000 UNITS: 5000 INJECTION INTRAVENOUS; SUBCUTANEOUS at 09:02

## 2023-02-04 RX ADMIN — MEROPENEM 2 G: 1 INJECTION INTRAVENOUS at 10:02

## 2023-02-04 RX ADMIN — BUPROPION HYDROCHLORIDE 300 MG: 300 TABLET, FILM COATED, EXTENDED RELEASE ORAL at 09:02

## 2023-02-04 RX ADMIN — Medication 1.5 MCG/KG/MIN: at 06:02

## 2023-02-04 RX ADMIN — METHOCARBAMOL 1000 MG: 500 TABLET ORAL at 09:02

## 2023-02-04 RX ADMIN — MEROPENEM 2 G: 1 INJECTION INTRAVENOUS at 05:02

## 2023-02-04 RX ADMIN — PHENYLEPHRINE HYDROCHLORIDE 1.5 MCG/KG/MIN: 10 INJECTION INTRAVENOUS at 08:02

## 2023-02-04 RX ADMIN — HEPARIN SODIUM 5000 UNITS: 5000 INJECTION INTRAVENOUS; SUBCUTANEOUS at 03:02

## 2023-02-04 RX ADMIN — OXYCODONE HYDROCHLORIDE 10 MG: 10 TABLET ORAL at 09:02

## 2023-02-04 RX ADMIN — PHENYLEPHRINE HYDROCHLORIDE 0.5 MCG/KG/MIN: 10 INJECTION INTRAVENOUS at 08:02

## 2023-02-04 RX ADMIN — Medication 10 ML: at 05:02

## 2023-02-04 RX ADMIN — Medication: at 11:02

## 2023-02-04 RX ADMIN — POLYETHYLENE GLYCOL 3350 17 G: 17 POWDER, FOR SOLUTION ORAL at 09:02

## 2023-02-04 RX ADMIN — LACTULOSE 20 G: 20 SOLUTION ORAL at 03:02

## 2023-02-04 RX ADMIN — GABAPENTIN 600 MG: 300 CAPSULE ORAL at 03:02

## 2023-02-04 RX ADMIN — LACTULOSE 20 G: 20 SOLUTION ORAL at 09:02

## 2023-02-04 RX ADMIN — METHOCARBAMOL 1000 MG: 500 TABLET ORAL at 12:02

## 2023-02-04 RX ADMIN — MEROPENEM 2 G: 1 INJECTION INTRAVENOUS at 12:02

## 2023-02-04 RX ADMIN — GABAPENTIN 600 MG: 300 CAPSULE ORAL at 09:02

## 2023-02-04 RX ADMIN — METHOCARBAMOL 1000 MG: 500 TABLET ORAL at 05:02

## 2023-02-04 RX ADMIN — Medication 1 MCG/KG/MIN: at 12:02

## 2023-02-04 RX ADMIN — ACETAMINOPHEN 1000 MG: 500 TABLET ORAL at 09:02

## 2023-02-04 RX ADMIN — Medication 1.5 MCG/KG/MIN: at 03:02

## 2023-02-04 RX ADMIN — Medication: at 10:02

## 2023-02-04 RX ADMIN — SODIUM CHLORIDE 1000 ML: 9 INJECTION, SOLUTION INTRAVENOUS at 09:02

## 2023-02-04 NOTE — SUBJECTIVE & OBJECTIVE
Interval History: 2/4: NAEON, platelets improved to 195, drain output decreasing. Strength stable. MAP > 85    Medications:  Continuous Infusions:   hydromorphone in 0.9 % NaCl 6 mg/30 ml      phenylephrine 1.5 mcg/kg/min (02/04/23 0603)    phenylephrine       Scheduled Meds:   acetaminophen  1,000 mg Oral TID    buPROPion  300 mg Oral Daily    gabapentin  600 mg Oral TID    lactulose  20 g Oral TID    meropenem (MERREM) IVPB  2 g Intravenous Q8H    methocarbamoL  1,000 mg Oral QID    pantoprazole  40 mg Oral Daily    polyethylene glycol  17 g Oral BID    senna-docusate 8.6-50 mg  2 tablet Oral BID    sodium chloride 0.9%  10 mL Intravenous Q6H     PRN Meds:sodium chloride, sodium chloride, sodium chloride, sodium chloride, sodium chloride, sodium chloride, sodium chloride, dextrose 10%, dextrose 10%, diazePAM, glucagon (human recombinant), glucose, glucose, hydrALAZINE, labetalol, magnesium hydroxide 400 mg/5 ml, magnesium oxide, magnesium oxide, melatonin, naloxone, ondansetron, oxyCODONE, polyethylene glycol, potassium bicarbonate, potassium bicarbonate, potassium bicarbonate, potassium, sodium phosphates, potassium, sodium phosphates, potassium, sodium phosphates, prochlorperazine, sodium chloride 0.9%, Flushing PICC Protocol **AND** sodium chloride 0.9% **AND** sodium chloride 0.9%     Review of Systems  Objective:     Weight: 132 kg (291 lb 0.1 oz)  Body mass index is 45.58 kg/m².  Vital Signs (Most Recent):  Temp: 98.2 °F (36.8 °C) (02/04/23 0315)  Pulse: 107 (02/04/23 0540)  Resp: 19 (02/04/23 0540)  BP: (!) 105/59 (02/04/23 0315)  SpO2: 95 % (02/04/23 0540) Vital Signs (24h Range):  Temp:  [97.8 °F (36.6 °C)-98.5 °F (36.9 °C)] 98.2 °F (36.8 °C)  Pulse:  [107-138] 107  Resp:  [13-23] 19  SpO2:  [93 %-99 %] 95 %  BP: ()/(50-72) 105/59  Arterial Line BP: ()/(59-79) 98/59                              Neurosurgery Physical Exam  General: well developed, well nourished, no distress.   Head:  normocephalic, atraumatic  Neurologic: Alert and oriented. Thought content appropriate.  GCS: Motor: 6/Verbal: 5/Eyes: 4 GCS Total: 15  Mental Status: Awake, Alert, Oriented x 4  Language: No aphasia  Speech: No dysarthria  Cranial nerves: face symmetric, tongue midline, CN II-XII grossly intact.   Eyes: pupils equal, round, reactive to light with accommodation, EOMI, nystagmus.   Pulmonary: normal respirations, no signs of respiratory distress  Abdomen: soft, non-distended, not tender to palpation  Skin: Skin is warm, dry and intact.  Sensory: intact to light touch throughout     Motor Strength:Moves all extremities spontaneously with good tone.  Full strength upper and extremities. No abnormal movements seen.      Strength   Deltoids Triceps Biceps Wrist Extension Wrist Flexion Hand    Upper: R 5/5 5/5 5/5 5/5 5/5 5/5     L 5/5 5/5 5/5 5/5 5/5 5/5       Iliopsoas Quadriceps Knee  Flexion Tibialis  anterior Gastro- cnemius EHL   Lower: R 3/5 4/5 4/5 4+/5 4+/5 4+/5     L 3/5 4/5 4/5 4+/5 4+/5 4+/5      Reflexes:   DTR: 2+ symmetrically throughout.  Parker's: Negative.  Babinski's: Present bilaterally  Clonus: 2 beats bilateral lower extremity     Incision well healed       Significant Labs:  Recent Labs   Lab 02/03/23  0159 02/04/23  0400   * 101    135*   K 4.6 3.7    104   CO2 24 24   BUN 22* 29*   CREATININE 0.5 0.4*   CALCIUM 7.6* 7.6*   MG 2.0 1.9       Recent Labs   Lab 02/02/23  1510 02/03/23  0159 02/04/23  0400   WBC 9.91 2.34* 12.44   HGB 7.8* 7.2* 7.5*   HCT 25.1* 22.8* 24.1*    77* 196       No results for input(s): LABPT, INR, APTT in the last 48 hours.    Microbiology Results (last 7 days)       Procedure Component Value Units Date/Time    Fungus culture [[id number]] Collected: 01/24/23 0913    Order Status: Completed Specimen: Body Fluid from Back Updated: 02/02/23 0954     Fungus (Mycology) Culture Culture in progress    Narrative:      2) Perispinal    Fungus culture  [[id number]] Collected: 01/24/23 0913    Order Status: Completed Specimen: Body Fluid from Back Updated: 02/02/23 0954     Fungus (Mycology) Culture Culture in progress    Narrative:      1) Perispinal    Fungus culture [[id number]] Collected: 01/24/23 0943    Order Status: Completed Specimen: Bone from Back Updated: 02/02/23 0954     Fungus (Mycology) Culture Culture in progress    Narrative:      3) Perispinal    Fungus culture [[id number]] Collected: 01/24/23 0943    Order Status: Completed Specimen: Bone from Back Updated: 02/02/23 0954     Fungus (Mycology) Culture Culture in progress    Narrative:      4) Perispinal    Blood culture [[id number]] Collected: 01/27/23 0120    Order Status: Completed Specimen: Blood from Peripheral, Forearm, Right Updated: 02/01/23 0612     Blood Culture, Routine No growth after 5 days.    Blood culture [[id number]] Collected: 01/27/23 0123    Order Status: Completed Specimen: Blood from Peripheral, Antecubital, Left Updated: 02/01/23 0612     Blood Culture, Routine No growth after 5 days.    Culture, Anaerobe [[id number]] Collected: 01/24/23 0943    Order Status: Completed Specimen: Bone from Back Updated: 01/30/23 0943     Anaerobic Culture No anaerobes isolated    Narrative:      4) Perispinal    Culture, Anaerobe [[id number]] Collected: 01/24/23 0943    Order Status: Completed Specimen: Bone from Back Updated: 01/30/23 0942     Anaerobic Culture No anaerobes isolated    Narrative:      3) Perispinal    Culture, Anaerobe [[id number]] Collected: 01/24/23 0913    Order Status: Completed Specimen: Body Fluid from Back Updated: 01/30/23 0942     Anaerobic Culture No anaerobes isolated    Narrative:      2) Perispinal    Culture, Anaerobe [[id number]] Collected: 01/24/23 0913    Order Status: Completed Specimen: Body Fluid from Back Updated: 01/30/23 0859     Anaerobic Culture No anaerobes isolated    Narrative:      1) Perispinal    Aerobic culture [[id number]]  (Abnormal)  (Susceptibility)  Collected: 01/24/23 0943    Order Status: Completed Specimen: Bone from Back Updated: 01/28/23 1145     Aerobic Bacterial Culture ESCHERICHIA COLI ESBL  From broth only      Narrative:      4) Perispinal          All pertinent labs from the last 24 hours have been reviewed.    Significant Diagnostics:  CT C-spine:  Vertebrae: The dens, lateral masses, and occipital condyles are intact.  There is no evidence of fracture or dislocation.     Discs: Multilevel disc height loss and degenerative endplate change.  Prominent C6 inferior endplate Schmorl's node, similar in appearance dating back to MRI from 02/14/2022.     Degenerative changes: Sent spinal canal dimensions at C3-C4 are 10.5 mm, 8.5 mm at C4-C5, 10.0 mm at C5-C6, and 10.8 mm at C6-C7 .  Multilevel uncovertebral spurring with multilevel neural foraminal narrowing most pronounced at C5-C6 with moderate right neural foraminal narrowing and C6-C7 with moderate left neural foraminal narrowing.    Physical Exam  Neurosurgery Physical Exam

## 2023-02-04 NOTE — PLAN OF CARE
[facility] Care Plan    POC reviewed with Rachel Zazueta and family at 1400. Pt verbalized understanding. Questions and concerns addressed. No acute events today. Pt progressing toward goals. Will continue to monitor. See below and flowsheets for full assessment and VS info.     - 1 L NS bolus given  - Tristen gtt continued   - Jones removed   - bath given and linen changed.   - Oxycodone given x 1        Is this a stroke patient? no    Neuro:  Jane Coma Scale  Best Eye Response: 4-->(E4) spontaneous  Best Motor Response: 6-->(M6) obeys commands  Best Verbal Response: 5-->(V5) oriented  [place] Coma Scale Score: 15  Assessment Qualifiers: patient not sedated/intubated, no eye obstruction present  Pupil PERRLA: yes     24 hr Temp:  [97.8 °F (36.6 °C)-98.5 °F (36.9 °C)]     CV:   Rhythm: normal sinus rhythm, sinus tachycardia  BP goals:   SBP < 160  MAP > 85    Resp:      Oxygen Concentration (%): 24    Plan: N/A    GI/:     Diet/Nutrition Received: regular  Last Bowel Movement: 02/02/23  Voiding Characteristics: ureteral catheter    Intake/Output Summary (Last 24 hours) at 2/4/2023 1653  Last data filed at 2/4/2023 1601  Gross per 24 hour   Intake 3276.11 ml   Output 1150 ml   Net 2126.11 ml     Unmeasured Output  Urine Occurrence: 1  Stool Occurrence: 0  Pad Count: 1    Labs/Accuchecks:  Recent Labs   Lab 02/04/23  0400   WBC 12.44   RBC 2.62*   HGB 7.5*   HCT 24.1*         Recent Labs   Lab 02/04/23  0400   *   K 3.7   CO2 24      BUN 29*   CREATININE 0.4*   ALKPHOS 104   ALT 29   AST 45*   BILITOT 2.1*      Recent Labs   Lab 02/01/23  1113   INR 1.2    No results for input(s): CPK, CPKMB, TROPONINI, MB in the last 168 hours.    Electrolytes: No replacement orders  Accuchecks: none    Gtts:   hydromorphone in 0.9 % NaCl 6 mg/30 ml      phenylephrine 1.5 mcg/kg/min (02/04/23 1601)       LDA/Wounds:  Lines/Drains/Airways       Peripherally Inserted Central Catheter Line  Duration             PICC  Double Lumen 01/26/23 1209 left basilic 9 days              Drain  Duration                  Closed/Suction Drain 01/24/23 Posterior Back Accordion 10 Fr. 11 days         Closed/Suction Drain 01/24/23 Posterior;Right Back Accordion 10 Fr. 11 days    Female External Urinary Catheter 01/27/23 1115 8 days         Closed/Suction Drain 02/02/23 1401 Right Back Bulb 15 Fr. 2 days         Closed/Suction Drain 02/02/23 1402 Left Back Bulb 15 Fr. 2 days         Closed/Suction Drain 02/02/23 1402 Left Back Bulb 15 Fr. 2 days         Closed/Suction Drain 02/02/23 1402 Right Back Bulb 15 Fr. 2 days         Urethral Catheter 02/02/23 0853 Non-latex;Straight-tip 16 Fr. 2 days              Arterial Line  Duration             Arterial Line 02/02/23 1206 Right Radial 2 days              Peripheral Intravenous Line  Duration                  Midline Catheter Insertion/Assessment  - Single Lumen 01/12/23 2138 Right basilic vein (medial side of arm) 18g x 10cm 22 days         Peripheral IV - Single Lumen 02/02/23 0900 20 G Left Hand 2 days                  Wounds: Yes  Wound care consulted: Yes

## 2023-02-04 NOTE — SUBJECTIVE & OBJECTIVE
Interval History: Remains stable post operatively from laminectomy, fusion and corpectomy along with muscle and skin flap closure, reports pain is well controlled and denies fevers or rigors.     Review of Systems  Constitutional:  Positive for activity change and fatigue. Negative for appetite change and fever.   HENT:  Negative for trouble swallowing.    Respiratory:  Negative for shortness of breath.    Cardiovascular:  Negative for chest pain.   Gastrointestinal:  Negative for abdominal pain.   Musculoskeletal:  Positive for arthralgias and back pain.   Skin:  Positive for wound.   Neurological:  Negative for speech difficulty.   Psychiatric/Behavioral:  Negative for behavioral problems and confusion.   Objective:     Vital Signs (Most Recent):  Temp: 98.3 °F (36.8 °C) (02/03/23 1501)  Pulse: (!) 124 (02/03/23 1801)  Resp: 17 (02/03/23 1801)  BP: (!) 103/55 (02/03/23 1801)  SpO2: 96 % (02/03/23 1801)   Vital Signs (24h Range):  Temp:  [97.6 °F (36.4 °C)-98.5 °F (36.9 °C)] 98.3 °F (36.8 °C)  Pulse:  [] 124  Resp:  [12-27] 17  SpO2:  [95 %-100 %] 96 %  BP: ()/(52-72) 103/55  Arterial Line BP: ()/(56-80) 107/63     Weight: 129.5 kg (285 lb 7.9 oz)  Body mass index is 44.71 kg/m².    Estimated Creatinine Clearance: 205.5 mL/min (based on SCr of 0.5 mg/dL).    Physical Exam  Constitutional:       Appearance: She is obese. She is not ill-appearing or toxic-appearing.   HENT:      Head: Normocephalic and atraumatic.      Nose: Nose normal.      Mouth/Throat:      Mouth: Mucous membranes are moist.      Pharynx: Oropharynx is clear.   Eyes:      Extraocular Movements: Extraocular movements intact.      Conjunctiva/sclera: Conjunctivae normal.      Pupils: Pupils are equal, round, and reactive to light.   Cardiovascular:      Rate and Rhythm: Normal rate and regular rhythm.      Pulses: Normal pulses.      Heart sounds: Normal heart sounds.   Pulmonary:      Effort: Pulmonary effort is normal.       Breath sounds: Normal breath sounds.   Abdominal:      General: Bowel sounds are normal.      Palpations: Abdomen is soft.   Musculoskeletal:         General: No swelling, tenderness or deformity.      Cervical back: Normal range of motion and neck supple.      Comments: Surgical drains x 4 present with serosanguinous drainage   Skin:     General: Skin is warm and dry.      Comments: LUE picc line c/d/I  RUE midline c/d/i   Neurological:      Mental Status: She is oriented to person, place, and time. Mental status is at baseline.   Significant Labs: Blood Culture:   Recent Labs   Lab 01/10/23  2344 01/10/23  2348 01/20/23  1131 01/27/23  0120 01/27/23  0123   LABBLOO No growth after 5 days. No growth after 5 days. No growth after 5 days.  No growth after 5 days. No growth after 5 days. No growth after 5 days.     BMP:   Recent Labs   Lab 02/03/23  0159   *      K 4.6      CO2 24   BUN 22*   CREATININE 0.5   CALCIUM 7.6*   MG 2.0     Microbiology Results (last 7 days)       Procedure Component Value Units Date/Time    Fungus culture [[id number]] Collected: 01/24/23 0913    Order Status: Completed Specimen: Body Fluid from Back Updated: 02/02/23 0954     Fungus (Mycology) Culture Culture in progress    Narrative:      2) Perispinal    Fungus culture [[id number]] Collected: 01/24/23 0913    Order Status: Completed Specimen: Body Fluid from Back Updated: 02/02/23 0954     Fungus (Mycology) Culture Culture in progress    Narrative:      1) Perispinal    Fungus culture [[id number]] Collected: 01/24/23 0943    Order Status: Completed Specimen: Bone from Back Updated: 02/02/23 0954     Fungus (Mycology) Culture Culture in progress    Narrative:      3) Perispinal    Fungus culture [[id number]] Collected: 01/24/23 0943    Order Status: Completed Specimen: Bone from Back Updated: 02/02/23 0954     Fungus (Mycology) Culture Culture in progress    Narrative:      4) Perispinal    Blood culture [[id number]]  Collected: 01/27/23 0120    Order Status: Completed Specimen: Blood from Peripheral, Forearm, Right Updated: 02/01/23 0612     Blood Culture, Routine No growth after 5 days.    Blood culture [[id number]] Collected: 01/27/23 0123    Order Status: Completed Specimen: Blood from Peripheral, Antecubital, Left Updated: 02/01/23 0612     Blood Culture, Routine No growth after 5 days.    Culture, Anaerobe [[id number]] Collected: 01/24/23 0943    Order Status: Completed Specimen: Bone from Back Updated: 01/30/23 0943     Anaerobic Culture No anaerobes isolated    Narrative:      4) Perispinal    Culture, Anaerobe [[id number]] Collected: 01/24/23 0943    Order Status: Completed Specimen: Bone from Back Updated: 01/30/23 0942     Anaerobic Culture No anaerobes isolated    Narrative:      3) Perispinal    Culture, Anaerobe [[id number]] Collected: 01/24/23 0913    Order Status: Completed Specimen: Body Fluid from Back Updated: 01/30/23 0942     Anaerobic Culture No anaerobes isolated    Narrative:      2) Perispinal    Culture, Anaerobe [[id number]] Collected: 01/24/23 0913    Order Status: Completed Specimen: Body Fluid from Back Updated: 01/30/23 0859     Anaerobic Culture No anaerobes isolated    Narrative:      1) Perispinal    Aerobic culture [[id number]]  (Abnormal)  (Susceptibility) Collected: 01/24/23 0943    Order Status: Completed Specimen: Bone from Back Updated: 01/28/23 1145     Aerobic Bacterial Culture ESCHERICHIA COLI ESBL  From broth only      Narrative:      4) Perispinal          Recent Lab Results         02/03/23  0159        Albumin 2.1       Alkaline Phosphatase 99       ALT 29       Anion Gap 8       AST 48       Baso # 0.00       Basophil % 0.0       BILIRUBIN TOTAL 4.6  Comment: For infants and newborns, interpretation of results should be based  on gestational age, weight and in agreement with clinical  observations.    Premature Infant recommended reference ranges:  Up to 24 hours.............<8.0  mg/dL  Up to 48 hours............<12.0 mg/dL  3-5 days..................<15.0 mg/dL  6-29 days.................<15.0 mg/dL         BUN 22       Calcium 7.6       Chloride 104       CO2 24       Creatinine 0.5       Differential Method Automated       eGFR >60.0       Eos # 0.0       Eosinophil % 0.0       Glucose 117       Gran # (ANC) 1.8       Gran % 77.7       Hematocrit 22.8       Hemoglobin 7.2       Immature Grans (Abs) 0.02  Comment: Mild elevation in immature granulocytes is non specific and   can be seen in a variety of conditions including stress response,   acute inflammation, trauma and pregnancy. Correlation with other   laboratory and clinical findings is essential.         Immature Granulocytes 0.9       Lymph # 0.3       Lymph % 11.1       Magnesium 2.0       MCH 28.2       MCHC 31.6       MCV 89       Mono # 0.2       Mono % 10.3       MPV 10.1       nRBC 0       Phosphorus 2.9       Platelets 77       Potassium 4.6       PROTEIN TOTAL 4.6       RBC 2.55       RDW 18.0       Sodium 136       WBC 2.34               Significant Imaging: I have reviewed all pertinent imaging results/findings within the past 24 hours.

## 2023-02-04 NOTE — PT/OT/SLP PROGRESS
Physical Therapy      Patient Name:  Rachel Zazueta   MRN:  [id number]    Patient not seen today secondary to Other (Comment) (paged and secure chatted neurosurgery at 9am to clarify HOB restriction orders and whether patient is cleared for OOB mobility). Neurosurgery responded at 2pm that patient's HOB restrictions have been lifted, per Dr. Mabel Chang. Requested that he update documented orders for clarity. PT unable to return for second eval attempt in the afternoon. Will follow-up as appropriate.

## 2023-02-04 NOTE — SUBJECTIVE & OBJECTIVE
Review of Systems  Constitutional: Denies fevers, weight loss, chills, or weakness.  Eyes: Denies changes in vision.  ENT: Denies dysphagia, nasal discharge, ear pain or discharge.  Cardiovascular: Denies chest pain, palpitations, orthopnea, or claudication.  Respiratory: Denies shortness of breath, cough, hemoptysis, or wheezing.  GI: Denies nausea/vomitting, hematochezia, melena, abd pain, or changes in appetite.  : Denies dysuria, incontinence, or hematuria.  Musculoskeletal: Denies joint pain or myalgias.  Skin/breast: Denies rashes, lumps, lesions, or discharge.  Neurologic: Denies headache, dizziness, vertigo, or paresthesias. + surgical pain, back pain  Psychiatric: Denies changes in mood or hallucinations.  Endocrine: Denies polyuria, polydipsia, heat/cold intolerance.  Hematologic/Lymph: Denies lymphadenopathy, easy bruising or easy bleeding.  Allergic/Immunologic: Denies rash, rhinitis.    Objective:     Vitals:  Temp: 98.5 °F (36.9 °C)  Pulse: (!) 112  Rhythm: normal sinus rhythm, sinus tachycardia  BP: 121/61  MAP (mmHg): 84  Resp: 18  SpO2: 97 %    Temp  Min: 97.8 °F (36.6 °C)  Max: 98.5 °F (36.9 °C)  Pulse  Min: 96  Max: 138  BP  Min: 93/50  Max: 121/61  MAP (mmHg)  Min: 67  Max: 87  Resp  Min: 13  Max: 24  SpO2  Min: 93 %  Max: 99 %    02/03 0701 - 02/04 0700  In: 1261 [P.O.:480]  Out: 1885 [Urine:765; Drains:1120]   Unmeasured Output  Urine Occurrence: 1  Stool Occurrence: 0  Pad Count: 1       Physical Exam  GA:  comfortable, no acute distress.   HEENT: No scleral icterus or JVD.   Pulmonary: Clear to auscultation A/L.  Cardiac: RRR S1 & S2 w/o rubs/murmurs/gallops.   Abdominal: Bowel sounds present x 4. No appreciable hepatosplenomegaly.  Skin: No jaundice, rashes, or visible lesions.  Neuro:  --GCS: E4 V5 M6  --Mental Status:  awake, oriented X4, follows commands, fluent speech  --CN II-XII grossly intact.   --Pupils 3mm, PERRL.   --Corneal reflex, gag, cough intact.  --FOWLER  spont    Medications:  Continuoushydromorphone in 0.9 % NaCl 6 mg/30 ml  phenylephrine, Last Rate: 1 mcg/kg/min (02/04/23 1301)    Scheduledacetaminophen, 1,000 mg, TID  buPROPion, 300 mg, Daily  gabapentin, 600 mg, TID  heparin (porcine), 5,000 Units, Q8H  lactulose, 20 g, TID  meropenem (MERREM) IVPB, 2 g, Q8H  methocarbamoL, 1,000 mg, QID  pantoprazole, 40 mg, Daily  polyethylene glycol, 17 g, BID  senna-docusate 8.6-50 mg, 2 tablet, BID  sodium chloride 0.9%, 10 mL, Q6H    PRNsodium chloride, , Q24H PRN  sodium chloride, , Q24H PRN  sodium chloride, , Q24H PRN  sodium chloride, , Q24H PRN  sodium chloride, , Q24H PRN  sodium chloride, , Q24H PRN  sodium chloride, , Q24H PRN  dextrose 10%, 12.5 g, PRN  dextrose 10%, 25 g, PRN  diazePAM, 5 mg, Q6H PRN  glucagon (human recombinant), 1 mg, PRN  glucose, 16 g, PRN  glucose, 24 g, PRN  hydrALAZINE, 10 mg, Q6H PRN  labetalol, 10 mg, Q4H PRN  magnesium hydroxide 400 mg/5 ml, 30 mL, Daily PRN  magnesium oxide, 800 mg, PRN  magnesium oxide, 800 mg, PRN  melatonin, 6 mg, Nightly PRN  naloxone, 0.02 mg, PRN  ondansetron, 4 mg, Q6H PRN  oxyCODONE, 10 mg, Q4H PRN  polyethylene glycol, 17 g, Daily PRN  potassium bicarbonate, 35 mEq, PRN  potassium bicarbonate, 50 mEq, PRN  potassium bicarbonate, 60 mEq, PRN  potassium, sodium phosphates, 2 packet, PRN  potassium, sodium phosphates, 2 packet, PRN  potassium, sodium phosphates, 2 packet, PRN  prochlorperazine, 2.5 mg, Q6H PRN  sodium chloride 0.9%, 10 mL, Q12H PRN  sodium chloride 0.9%, 10 mL, PRN      Today I personally reviewed pertinent medications, lines/drains/airways, imaging, cardiology results, laboratory results, microbiology results,     Diet  Diet Adult Regular (IDDSI Level 7)

## 2023-02-04 NOTE — ASSESSMENT & PLAN NOTE
41 yo F w/ history of multiple spinal surgeries presented to OSH with possible infection of patient's shoulder and found to have UTI and E. Coli bacteremia and progressively worse B/L LE weakness. Transferred to [facility] [place] for neurosurgical evaluation. Underwent Laminectomy T8-T10; Posterior spinal fusion T8-T12; hardware removal and washout. Admitted to [facility] for HLOC. Arrived to unit on PCA dilaudid pump. Patient is HDS on 3L NC.   - Admit to NSCCU  - NSGY, ID following  - q2hr neuro checks, vitals, I/Os  - HV drains x 2 in place, management per NSGY  -Transfuse for Hg<7  - Cultures positive for ESBL, blood cultures with NGTD. WCTM  - Antibiotics per ID recs  - CMP, Mag, Phos, CBC daily  - MAP goals > 65, SBP < 180  - PRN labetalol, hydralazine  - MM pain regimen; PCA Dilaudid pump for pain management  - Patient will need to lie flat with HOB 15-20°   - Aggressive bowel regimen  - PT/OT as appropriate   - VTE prophylaxis: mechanical, hold chemical given pancytopenia  - post operative management - POD#1  - f/u with nsgy regarding post-operative positional and mobility restrictions  2/4/2023: MAP >85m suri gtt available

## 2023-02-04 NOTE — PLAN OF CARE
Outpatient Antibiotic Therapy Plan:    Please send referral to Patient's designated facility at time of discharge      1) Infection: ESBL E coli discitis with involvement of hardware    2) Discharge Antibiotics:    Intravenous antibiotics:  Meropenem 2 g IV q 8 hours       3) Therapy Duration:  8 weeks    Estimated end date of IV antibiotics: 03/29/2023    4) Outpatient Weekly Labs:    Order the following labs to be drawn on Mondays:   CBC  CMP   CRP    5) Fax Lab Results to Infectious Diseases Provider: Dr. Uribe or Dr. Bellamy    [facility] Fax Number: [phone]    6) Outpatient Infectious Diseases Follow-up    Follow-up appointment will be arranged by the ID clinic and will be found in the patient's appointments tab.    Prior to discharge, please ensure the patient's follow-up has been scheduled.    If there is still no follow-up scheduled prior to discharge, please send an EPIC message to Rebekah Bonds in Infectious Diseases.

## 2023-02-04 NOTE — ASSESSMENT & PLAN NOTE
42 year old female w/ chronic HCV cirrhosis, spinal fusion 4/2021, epidural abscess (GBS) with removal of hardware (02/2022), T10-pelvis spinal fusion with hardware (03/2022), obesity,  and neurogenic bladder with recent admission for ESBL E coli bacteremia from urinary source which was treated.then had - increasing weakness to lower extremities, and concerning MRI findings, transferred to [facility] [place]. NSGY consulted, 2 stage revision was planned, she is status post temporary T6-12 Posterior Spinal Instrumented Fusion, operative cultures with E coli and ESBL E coli.  Pending further surgical intervention on 2/1. Blood cultures this admission with no growth. Remains afebrile, thrombocytopenia - s/p platelets transfused and no overt clinical bleeding noted. s/p thoracic laminectomy with T4 -  pelvis fusion w/ T9-10 corpectomies on 2/2/23 along with paraspinous, latissimus dorsi muscle and skin flap closures, has four surgical drains with serosanguinous drainage, remains stable post operatively. Afebrile without leukocytosis.       Recommendations    - Continue Meropenem- total 8 weeks of therapy from today. See OPAT note for details.

## 2023-02-04 NOTE — PROGRESS NOTES
[facility]  Infectious Disease  Progress Note    Patient Name: Rachel Zazueta  MRN: [id number]  Admission Date: 1/19/2023  Length of Stay: 15 days  Attending Physician: Ace Rowley MD  Primary Care Provider: Dannielle Merino DO    Isolation Status: Contact  Assessment/Plan:      Thoracic discitis  42 year old female w/ chronic HCV cirrhosis, spinal fusion 4/2021, epidural abscess (GBS) with removal of hardware (02/2022), T10-pelvis spinal fusion with hardware (03/2022), obesity,  and neurogenic bladder with recent admission for ESBL E coli bacteremia from urinary source which was treated.then had - increasing weakness to lower extremities, and concerning MRI findings, transferred to [facility]. NSGY consulted, 2 stage revision was planned, she is status post temporary T6-12 Posterior Spinal Instrumented Fusion, operative cultures with E coli and ESBL E coli.  Pending further surgical intervention on 2/1. Blood cultures this admission with no growth. Remains afebrile, thrombocytopenia - s/p platelets transfused and no overt clinical bleeding noted. s/p thoracic laminectomy with T4 -  pelvis fusion w/ T9-10 corpectomies on 2/2/23 along with paraspinous, latissimus dorsi muscle and skin flap closures, has four surgical drains with serosanguinous drainage, remains stable post operatively. Afebrile without leukocytosis.       Recommendations    - Continue Meropenem- total 8 weeks of therapy from today. See OPAT note for details.         Anticipated Disposition: TBD    Thank you for your consult. I will sign off. Please contact us if you have any additional questions.    Tj Uribe MD  Infectious Disease  [facility]    Subjective:     Principal Problem:Weakness of both lower extremities    HPI: Ms. Zazueta is a 42-year-old woman known to ID with hx of IVDU, IV drug use (methamphetamine), chronic HCV with cirrhosis, chronic pancytopenia, spinal fusion (04/2021),  epidural abscess (GBS) with removal of hardware (02/2022), T10-pelvis spinal fusion with hardware (03/2022), obesity,  and neurogenic bladder who was admitted to [facility] on 12/23/22 with back pain and increasing weakness in the lower extremities.       Urine and blood cx from that admission + for ESBL E coli which was treated with ertapenem.  Repeat blood cx on 12/28 and 1/10 negative.   Patient started on physical therapy but weakness in the lower extremities increased, and the patient is no longer able to walk. She is able to urinate intermittently and defecate.      An MRI cervical spine 1/10 was significant  for multilevel spondylosis.  MRI brain with nonspecific findings.  Xray of spine notable for focal kyphosis at T9-10 increased when compared to prior scoliosis radiographs.  An MRI of the spine was not available due to the patient's size. and she was transferred to [facility] for advanced imaging and Neurosurgery evaluation.      MRI  imaging here  on 1/20 concerning for worsening kyphosis, extensive edema in the paraspinal muscle. CT with spondylodiscitis T8-9, T9-10    Neurosurgery planning hardware removal and washout on 1/24    Of note, review of records show admission to [facility] 11/14/2022 to 11/22/2022 with pyogenic arthritis of the left shoulder, s/p I&D on 11/15 with cx positive for E Coli.  Utox was positive for methadone, opiates, amphetamines, and THC.  Seen by ID at [facility] who recommend IV ceftriaxone X 6 weeks at LTAC, but patient was not agreeable to this.  Offered  daily ceftriaxone infusions at infusion center.  At discharge from there she had received 8 days of ceftriaxone. Review of notes show she was subsequently prescribed 4 weeks of Bactrim 2 DS tabs twice daily.       Interval History: Remains stable post operatively from laminectomy, fusion and corpectomy along with muscle and skin flap closure, reports pain is well controlled and denies fevers or rigors.      Review of Systems  Constitutional:  Positive for activity change and fatigue. Negative for appetite change and fever.   HENT:  Negative for trouble swallowing.    Respiratory:  Negative for shortness of breath.    Cardiovascular:  Negative for chest pain.   Gastrointestinal:  Negative for abdominal pain.   Musculoskeletal:  Positive for arthralgias and back pain.   Skin:  Positive for wound.   Neurological:  Negative for speech difficulty.   Psychiatric/Behavioral:  Negative for behavioral problems and confusion.   Objective:     Vital Signs (Most Recent):  Temp: 98.3 °F (36.8 °C) (02/03/23 1501)  Pulse: (!) 124 (02/03/23 1801)  Resp: 17 (02/03/23 1801)  BP: (!) 103/55 (02/03/23 1801)  SpO2: 96 % (02/03/23 1801)   Vital Signs (24h Range):  Temp:  [97.6 °F (36.4 °C)-98.5 °F (36.9 °C)] 98.3 °F (36.8 °C)  Pulse:  [] 124  Resp:  [12-27] 17  SpO2:  [95 %-100 %] 96 %  BP: ()/(52-72) 103/55  Arterial Line BP: ()/(56-80) 107/63     Weight: 129.5 kg (285 lb 7.9 oz)  Body mass index is 44.71 kg/m².    Estimated Creatinine Clearance: 205.5 mL/min (based on SCr of 0.5 mg/dL).    Physical Exam  Constitutional:       Appearance: She is obese. She is not ill-appearing or toxic-appearing.   HENT:      Head: Normocephalic and atraumatic.      Nose: Nose normal.      Mouth/Throat:      Mouth: Mucous membranes are moist.      Pharynx: Oropharynx is clear.   Eyes:      Extraocular Movements: Extraocular movements intact.      Conjunctiva/sclera: Conjunctivae normal.      Pupils: Pupils are equal, round, and reactive to light.   Cardiovascular:      Rate and Rhythm: Normal rate and regular rhythm.      Pulses: Normal pulses.      Heart sounds: Normal heart sounds.   Pulmonary:      Effort: Pulmonary effort is normal.      Breath sounds: Normal breath sounds.   Abdominal:      General: Bowel sounds are normal.      Palpations: Abdomen is soft.   Musculoskeletal:         General: No swelling, tenderness or deformity.       Cervical back: Normal range of motion and neck supple.      Comments: Surgical drains x 4 present with serosanguinous drainage   Skin:     General: Skin is warm and dry.      Comments: LUE picc line c/d/I  RUE midline c/d/i   Neurological:      Mental Status: She is oriented to person, place, and time. Mental status is at baseline.   Significant Labs: Blood Culture:   Recent Labs   Lab 01/10/23  2344 01/10/23  2348 01/20/23  1131 01/27/23  0120 01/27/23 0123   LABBLOO No growth after 5 days. No growth after 5 days. No growth after 5 days.  No growth after 5 days. No growth after 5 days. No growth after 5 days.     BMP:   Recent Labs   Lab 02/03/23  0159   *      K 4.6      CO2 24   BUN 22*   CREATININE 0.5   CALCIUM 7.6*   MG 2.0     Microbiology Results (last 7 days)       Procedure Component Value Units Date/Time    Fungus culture [[id number]] Collected: 01/24/23 0913    Order Status: Completed Specimen: Body Fluid from Back Updated: 02/02/23 0954     Fungus (Mycology) Culture Culture in progress    Narrative:      2) Perispinal    Fungus culture [[id number]] Collected: 01/24/23 0913    Order Status: Completed Specimen: Body Fluid from Back Updated: 02/02/23 0954     Fungus (Mycology) Culture Culture in progress    Narrative:      1) Perispinal    Fungus culture [[id number]] Collected: 01/24/23 0943    Order Status: Completed Specimen: Bone from Back Updated: 02/02/23 0954     Fungus (Mycology) Culture Culture in progress    Narrative:      3) Perispinal    Fungus culture [[id number]] Collected: 01/24/23 0943    Order Status: Completed Specimen: Bone from Back Updated: 02/02/23 0954     Fungus (Mycology) Culture Culture in progress    Narrative:      4) Perispinal    Blood culture [[id number]] Collected: 01/27/23 0120    Order Status: Completed Specimen: Blood from Peripheral, Forearm, Right Updated: 02/01/23 0612     Blood Culture, Routine No growth after 5 days.    Blood culture [[id number]]  Collected: 01/27/23 0123    Order Status: Completed Specimen: Blood from Peripheral, Antecubital, Left Updated: 02/01/23 0612     Blood Culture, Routine No growth after 5 days.    Culture, Anaerobe [[id number]] Collected: 01/24/23 0943    Order Status: Completed Specimen: Bone from Back Updated: 01/30/23 0943     Anaerobic Culture No anaerobes isolated    Narrative:      4) Perispinal    Culture, Anaerobe [[id number]] Collected: 01/24/23 0943    Order Status: Completed Specimen: Bone from Back Updated: 01/30/23 0942     Anaerobic Culture No anaerobes isolated    Narrative:      3) Perispinal    Culture, Anaerobe [[id number]] Collected: 01/24/23 0913    Order Status: Completed Specimen: Body Fluid from Back Updated: 01/30/23 0942     Anaerobic Culture No anaerobes isolated    Narrative:      2) Perispinal    Culture, Anaerobe [[id number]] Collected: 01/24/23 0913    Order Status: Completed Specimen: Body Fluid from Back Updated: 01/30/23 0859     Anaerobic Culture No anaerobes isolated    Narrative:      1) Perispinal    Aerobic culture [[id number]]  (Abnormal)  (Susceptibility) Collected: 01/24/23 0943    Order Status: Completed Specimen: Bone from Back Updated: 01/28/23 1145     Aerobic Bacterial Culture ESCHERICHIA COLI ESBL  From broth only      Narrative:      4) Perispinal          Recent Lab Results         02/03/23  0159        Albumin 2.1       Alkaline Phosphatase 99       ALT 29       Anion Gap 8       AST 48       Baso # 0.00       Basophil % 0.0       BILIRUBIN TOTAL 4.6  Comment: For infants and newborns, interpretation of results should be based  on gestational age, weight and in agreement with clinical  observations.    Premature Infant recommended reference ranges:  Up to 24 hours.............<8.0 mg/dL  Up to 48 hours............<12.0 mg/dL  3-5 days..................<15.0 mg/dL  6-29 days.................<15.0 mg/dL         BUN 22       Calcium 7.6       Chloride 104       CO2 24       Creatinine 0.5        Differential Method Automated       eGFR >60.0       Eos # 0.0       Eosinophil % 0.0       Glucose 117       Gran # (ANC) 1.8       Gran % 77.7       Hematocrit 22.8       Hemoglobin 7.2       Immature Grans (Abs) 0.02  Comment: Mild elevation in immature granulocytes is non specific and   can be seen in a variety of conditions including stress response,   acute inflammation, trauma and pregnancy. Correlation with other   laboratory and clinical findings is essential.         Immature Granulocytes 0.9       Lymph # 0.3       Lymph % 11.1       Magnesium 2.0       MCH 28.2       MCHC 31.6       MCV 89       Mono # 0.2       Mono % 10.3       MPV 10.1       nRBC 0       Phosphorus 2.9       Platelets 77       Potassium 4.6       PROTEIN TOTAL 4.6       RBC 2.55       RDW 18.0       Sodium 136       WBC 2.34               Significant Imaging: I have reviewed all pertinent imaging results/findings within the past 24 hours.

## 2023-02-04 NOTE — PLAN OF CARE
Problem: Adult Inpatient Plan of Care  Goal: Plan of Care Review  Outcome: Ongoing, Progressing     Problem: Adjustment to Illness (Sepsis/Septic Shock)  Goal: Optimal Coping  Outcome: Ongoing, Progressing     Problem: Glycemic Control Impaired (Sepsis/Septic Shock)  Goal: Blood Glucose Level Within Desired Range  Outcome: Ongoing, Progressing     Problem: Infection Progression (Sepsis/Septic Shock)  Goal: Absence of Infection Signs and Symptoms  Outcome: Ongoing, Progressing     Problem: Nutrition Impaired (Sepsis/Septic Shock)  Goal: Optimal Nutrition Intake  Outcome: Ongoing, Progressing     Problem: Infection  Goal: Absence of Infection Signs and Symptoms  Outcome: Ongoing, Progressing     Problem: Skin Injury Risk Increased  Goal: Skin Health and Integrity  Outcome: Ongoing, Progressing     Problem: Fall Injury Risk  Goal: Absence of Fall and Fall-Related Injury  Outcome: Ongoing, Progressing     Problem: Pain (Stroke, Hemorrhagic)  Goal: Acceptable Pain Control  Outcome: Ongoing, Progressing     Problem: Bariatric Environmental Safety  Goal: Safety Maintained with Care  Outcome: Ongoing, Progressing

## 2023-02-04 NOTE — PROGRESS NOTES
Plastic and Reconstructive Surgery   Progress Note    Subjective:  Patient seen and examined at bedside. No acute event overnight. Patient state coco controlled. Patient with no complaints currently. Drains putting out a lot of serosanguinous fluid. Hgb stable.    Objective:  Vital signs in last 24 hours:  Temp:  [97.8 °F (36.6 °C)-98.5 °F (36.9 °C)] 98.5 °F (36.9 °C)  Pulse:  [] 115  Resp:  [13-24] 14  SpO2:  [93 %-99 %] 95 %  BP: ()/(50-68) 108/57  Arterial Line BP: ()/(59-79) 98/59    Intake/Output last 3 shifts:  I/O last 3 completed shifts:  In: 1470.6 [P.O.:480; IV Piggyback:990.6]  Out: 3180 [Urine:1355; Drains:1825]    Intake/Output this shift:  I/O this shift:  In: 2004.9 [I.V.:1014.9; IV Piggyback:990]  Out: 240 [Drains:240]        Physical Exam:  VITAL SIGNS:   Vitals:    23 0901 23 1001 23 1034 23 1101   BP: (!) 94/55 (!) 104/56  (!) 108/57   BP Location:    Right arm   Patient Position:    Lying   Pulse: 106 (!) 115  (!) 115   Resp: 13 20 16 14   Temp:    98.5 °F (36.9 °C)   TempSrc:    Oral   SpO2: 96% 97%  95%   Weight:       Height:         TMAX: Temp (24hrs), Av.2 °F (36.8 °C), Min:97.8 °F (36.6 °C), Max:98.5 °F (36.9 °C)    General: Alert; No acute distress  Cardiovascular: Regular rate   Respiratory: Normal respiratory effort. Chest rise symmetric.   Abdomen: Soft, nontender, nondistended  Extremity: Moves all extremities equally.  Neurologic: No focal deficit. Speech normal  Back: preveena dressing in place with 2 drains bilaterally, serosanguinous output.      Scheduled Medications acetaminophen, 1,000 mg, TID  buPROPion, 300 mg, Daily  gabapentin, 600 mg, TID  heparin (porcine), 5,000 Units, Q8H  lactulose, 20 g, TID  meropenem (MERREM) IVPB, 2 g, Q8H  methocarbamoL, 1,000 mg, QID  pantoprazole, 40 mg, Daily  polyethylene glycol, 17 g, BID  senna-docusate 8.6-50 mg, 2 tablet, BID  sodium chloride 0.9%, 10 mL, Q6H      PRN Medications sodium  chloride, sodium chloride, sodium chloride, sodium chloride, sodium chloride, sodium chloride, sodium chloride, dextrose 10%, dextrose 10%, diazePAM, glucagon (human recombinant), glucose, glucose, hydrALAZINE, labetalol, magnesium hydroxide 400 mg/5 ml, magnesium oxide, magnesium oxide, melatonin, naloxone, ondansetron, oxyCODONE, polyethylene glycol, potassium bicarbonate, potassium bicarbonate, potassium bicarbonate, potassium, sodium phosphates, potassium, sodium phosphates, potassium, sodium phosphates, prochlorperazine, sodium chloride 0.9%, Flushing PICC Protocol **AND** sodium chloride 0.9% **AND** sodium chloride 0.9%    Recent Labs:   Lab Results   Component Value Date    WBC 12.44 02/04/2023    HGB 7.5 (L) 02/04/2023    HCT 24.1 (L) 02/04/2023    MCV 92 02/04/2023     02/04/2023     Lab Results   Component Value Date     02/04/2023     (L) 02/04/2023    K 3.7 02/04/2023     02/04/2023    BUN 29 (H) 02/04/2023         Assessment: 42 y.o. y/o female 2 Days Post-Op s/p Procedure(s):  LAMINECTOMY, SPINE, THORACIC, WITH FUSION (T4-Pelvis fusion w/ T9-10 corpectomies) **AIRO Doing well postoperatively.  Wound vac without leak  Drains with SSO     Plan  -Drains to suction. Continue to monitor output  -Continue prevena  - f/u Neurosurgery recs  -Rest of care per primary      Efrem Sutton DO

## 2023-02-04 NOTE — ASSESSMENT & PLAN NOTE
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 Transferred to [facility] to  and neurosurgery was consulted    OR 1/24 for temporary T6-12 PSIF. Taz pus noted intraoperatively.   OR 2/2 for T4-pelvis revision and extension of fusion with T9-10 corpectomy with plastic surgery assistance       Plan:  --Admitted to ICU,   --Maps > 85  -- MRI C/T/L spine 1/20 with C7 SP enhancement, focal kyphosis at T8-10 with severe anterior height loss at T9, enhancing C6 inferior endplate Schmorl's node  -- CT T/L spine thin cut 1/20 with haloing of pelvic and T10 screws, spondylodiscitis T8-9, T9-10  -- CT C spine with concern for discitis at C6-7  -- flex ext XR done, limited view of C6-7 in swimmers view 2/2 shoulders  --post op xrays pending   -- 1/20 ESR 70, CRP 9.4. elevated from prior   -- TLSO at bedside  --MIHIR drains x4, monitor output   -- Can liberalize platelet goals if Hb stays stable and drain output continues to decrease  --ID: merropenam   -- BCx 1/20: NGTD   -- OR cultures 1/24: E. coli   -- multimodal pain control per primary medicine team  --NSGY will continue to follow. Please contact team with any further questions.

## 2023-02-04 NOTE — PROGRESS NOTES
[facility]  Neurocritical Care  Progress Note    Admit Date: 1/19/2023  Service Date: 02/04/2023  Length of Stay: 16    Subjective:     Chief Complaint: Weakness of both lower extremities    History of Present Illness: 42-year-old female with a history of IV drug use, chronic hep C with cirrhosis, spinal fusion in April 2021, complicated by epidural abscess with removal of hardware February 2022 and against spinal fusion in March 2022 of T10 through the pelvis as well as neurogenic bladder who initially presented to [facility] in late December for generalized weakness, fatigue and bilateral flank pain.  Her course at [facility] was complicated by a E coli bacteremia thought to be due to a urinary source, she is completed a 7 day course of meropenem on 1/3, and had bcx from 1/2 that did not grow any bacteria.       She also had an ICU admission for ongoing back spasms and agitation requiring Precedex.  In early January patient started developing progressive lower extremity weakness greater in the left compared to the right.  She also had an MRI at the outside hospital that showed some white matter changes concerning for possible demyelinating disease.  However her thoracic spine radiographs also showed worsening of her kyphosis at T9-T10.  Due to the concern for either a demyelinating polyneuropathy, MS, or spinal cord compression the patient was transferred to [facility] for neurosurgical evaluation.       On admission the patient had a CT lumbar and thoracic spine and an MRI CTL spine. The MRI showed  focal kyphosis T8 through T10, with possible associated cord edema signal.  As well as extensive edema throughout the posterior paraspinal musculature concerning for possible infectious or inflammatory process.  There was concern that the findings at T8 through T10 could correspond with compressive myelopathy.  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  There were also erosive changes involving the sacroiliac joints bilaterally which was concerning for sacroiliitis and could be infectious.     Patient underwent laminectomy T8-T10; posterior fusion T8-T12; and washout by NSGY today. No complications noted during surgery. Patient is HDS and on 3L NC. Patient is currently on a PCA dilaudid pump.            Hospital Course: 1/25/2023 NAEO, pain well controlled on current regimen. Continue ABX. Maintain logroll precautions and HOB <30 until second stage of surgery.  01/26/2023 Hemoglobin 6.6, repeat 6.2. Increased drainage noted from bilateral hemovac. S/p 1u pRBC. PICC team consulted for long term access for antibiotics, remove IJ central line when PICC placed.   01/27/2023: NAEON. HV to gravity with decreased drainge. Hgb 7.2 this AM, will trend CBC q12h. Lytes replaced. Bowel regimen adjusted.   01/28/2023: NAEON. Received 1 unit PBRC yesterday. Hgb 7.4 this AM. Plan for OR 2/2.   01/29/2023: NAEON. HV drainage decreasing. Hgb stable. Plan for OR 2/2  01/30/2023  Stable exam. Significant solid BM's  01/31/2023  Exam stable, patient in much better spirits today  02/01/2023  Exam stable  02/02/2023  Post-operatively heavily sedated. Oral airway in place  02/03/2023  Doing very well post operatively. Advance care as appropriate  2/4/2023: d/c gu cath, continue MAP goal until Sunday per NSGY team, new gtt available          Review of Systems  Constitutional: Denies fevers, weight loss, chills, or weakness.  Eyes: Denies changes in vision.  ENT: Denies dysphagia, nasal discharge, ear pain or discharge.  Cardiovascular: Denies chest pain, palpitations, orthopnea, or claudication.  Respiratory: Denies shortness of breath, cough, hemoptysis, or wheezing.  GI: Denies nausea/vomitting, hematochezia, melena, abd pain, or changes in appetite.  : Denies dysuria, incontinence, or  hematuria.  Musculoskeletal: Denies joint pain or myalgias.  Skin/breast: Denies rashes, lumps, lesions, or discharge.  Neurologic: Denies headache, dizziness, vertigo, or paresthesias. + surgical pain, back pain  Psychiatric: Denies changes in mood or hallucinations.  Endocrine: Denies polyuria, polydipsia, heat/cold intolerance.  Hematologic/Lymph: Denies lymphadenopathy, easy bruising or easy bleeding.  Allergic/Immunologic: Denies rash, rhinitis.    Objective:     Vitals:  Temp: 98.5 °F (36.9 °C)  Pulse: (!) 112  Rhythm: normal sinus rhythm, sinus tachycardia  BP: 121/61  MAP (mmHg): 84  Resp: 18  SpO2: 97 %    Temp  Min: 97.8 °F (36.6 °C)  Max: 98.5 °F (36.9 °C)  Pulse  Min: 96  Max: 138  BP  Min: 93/50  Max: 121/61  MAP (mmHg)  Min: 67  Max: 87  Resp  Min: 13  Max: 24  SpO2  Min: 93 %  Max: 99 %    02/03 0701 - 02/04 0700  In: 1261 [P.O.:480]  Out: 1885 [Urine:765; Drains:1120]   Unmeasured Output  Urine Occurrence: 1  Stool Occurrence: 0  Pad Count: 1       Physical Exam  GA:  comfortable, no acute distress.   HEENT: No scleral icterus or JVD.   Pulmonary: Clear to auscultation A/L.  Cardiac: RRR S1 & S2 w/o rubs/murmurs/gallops.   Abdominal: Bowel sounds present x 4. No appreciable hepatosplenomegaly.  Skin: No jaundice, rashes, or visible lesions.  Neuro:  --GCS: E4 V5 M6  --Mental Status:  awake, oriented X4, follows commands, fluent speech  --CN II-XII grossly intact.   --Pupils 3mm, PERRL.   --Corneal reflex, gag, cough intact.  --FOWLER spont    Medications:  Continuoushydromorphone in 0.9 % NaCl 6 mg/30 ml  phenylephrine, Last Rate: 1 mcg/kg/min (02/04/23 1301)    Scheduledacetaminophen, 1,000 mg, TID  buPROPion, 300 mg, Daily  gabapentin, 600 mg, TID  heparin (porcine), 5,000 Units, Q8H  lactulose, 20 g, TID  meropenem (MERREM) IVPB, 2 g, Q8H  methocarbamoL, 1,000 mg, QID  pantoprazole, 40 mg, Daily  polyethylene glycol, 17 g, BID  senna-docusate 8.6-50 mg, 2 tablet, BID  sodium chloride 0.9%, 10 mL,  Q6H    PRNsodium chloride, , Q24H PRN  sodium chloride, , Q24H PRN  sodium chloride, , Q24H PRN  sodium chloride, , Q24H PRN  sodium chloride, , Q24H PRN  sodium chloride, , Q24H PRN  sodium chloride, , Q24H PRN  dextrose 10%, 12.5 g, PRN  dextrose 10%, 25 g, PRN  diazePAM, 5 mg, Q6H PRN  glucagon (human recombinant), 1 mg, PRN  glucose, 16 g, PRN  glucose, 24 g, PRN  hydrALAZINE, 10 mg, Q6H PRN  labetalol, 10 mg, Q4H PRN  magnesium hydroxide 400 mg/5 ml, 30 mL, Daily PRN  magnesium oxide, 800 mg, PRN  magnesium oxide, 800 mg, PRN  melatonin, 6 mg, Nightly PRN  naloxone, 0.02 mg, PRN  ondansetron, 4 mg, Q6H PRN  oxyCODONE, 10 mg, Q4H PRN  polyethylene glycol, 17 g, Daily PRN  potassium bicarbonate, 35 mEq, PRN  potassium bicarbonate, 50 mEq, PRN  potassium bicarbonate, 60 mEq, PRN  potassium, sodium phosphates, 2 packet, PRN  potassium, sodium phosphates, 2 packet, PRN  potassium, sodium phosphates, 2 packet, PRN  prochlorperazine, 2.5 mg, Q6H PRN  sodium chloride 0.9%, 10 mL, Q12H PRN  sodium chloride 0.9%, 10 mL, PRN      Today I personally reviewed pertinent medications, lines/drains/airways, imaging, cardiology results, laboratory results, microbiology results,     Diet  Diet Adult Regular (IDDSI Level 7)        Assessment/Plan:     Psychiatric  Anxiety and depression  Continue Buproprion for depression  Continue Lorazepam for anxiety  Continue Gabapentin for neuropathy and anxiety    Substance use disorder  Denies IV drug use (meth) for past 3 years.  Denies alcohol and tobacco abuse.       ID  Thoracic discitis  CT T/L spine thin cut 1/20 with haloing of pelvic and T10 screws, spondylodiscitis T8-9, T9-10  CT C spine with concern for discitis at C6-7  Patient is currently afebrile with chronic pancytopenia  Previously on Amoxicillin up until surgery for chronic suppressive therapy.  ID following  Blood cx (1/29) NGTD  Intra-operative cultures with E.coli ESBL  Continue Vancomycin and meropenem  Trend CBC  daily  See above    GI  Chronic hepatitis C with cirrhosis  Hx of Hep C. See Cirrhosis of Liver    Cirrhosis of liver with ascites  Patient has reportedly refused transplant evaluation in the past.   -Daily CMP and trend LFTs  -Continue Lactulose 20g TID   -Will need GI/hepatology follow up outpatient    MELD-Na score: 15 at 2/3/2023  1:59 AM  MELD score: 14 at 2/3/2023  1:59 AM  Calculated from:  Serum Creatinine: 0.5 mg/dL (Using min of 1 mg/dL) at 2/3/2023  1:59 AM  Serum Sodium: 136 mmol/L at 2/3/2023  1:59 AM  Total Bilirubin: 4.6 mg/dL at 2/3/2023  1:59 AM  INR(ratio): 1.2 at 2/1/2023 11:13 AM  Age: 42 years    Orthopedic  * Weakness of both lower extremities  41 yo F w/ history of multiple spinal surgeries presented to OSH with possible infection of patient's shoulder and found to have UTI and E. Coli bacteremia and progressively worse B/L LE weakness. Transferred to [facility] for neurosurgical evaluation. Underwent Laminectomy T8-T10; Posterior spinal fusion T8-T12; hardware removal and washout. Admitted to [facility] for HLOC. Arrived to unit on PCA dilaudid pump. Patient is HDS on 3L NC.   - Admit to NSCCU  - NSGY, ID following  - q2hr neuro checks, vitals, I/Os  - HV drains x 2 in place, management per NSGY  -Transfuse for Hg<7  - Cultures positive for ESBL, blood cultures with NGTD. WCTM  - Antibiotics per ID recs  - CMP, Mag, Phos, CBC daily  - MAP goals > 65, SBP < 180  - PRN labetalol, hydralazine  - MM pain regimen; PCA Dilaudid pump for pain management  - Patient will need to lie flat with HOB 15-20°   - Aggressive bowel regimen  - PT/OT as appropriate   - VTE prophylaxis: mechanical, hold chemical given pancytopenia  - post operative management - POD#1  - f/u with nsgy regarding post-operative positional and mobility restrictions  2/4/2023: MAP >85m suri gtt available             The patient is being Prophylaxed for:  Venous Thromboembolism with: Chemical  Stress Ulcer with: PPI  Ventilator Pneumonia with: not  applicable    Activity Orders          Diet Adult Regular (IDDSI Level 7): Regular starting at 02/03 1024    Up in chair With meals starting at 02/02 1700    Progressive Mobility Protocol (mobilize patient to their highest level of functioning at least twice daily) starting at 02/02 1356    Elevate HOB 30 starting at 02/02 1356    Ambulate With Assistance, Post Op Day 0 If the patient arrives from PACU by 4PM, walk at least once on day of operation if alert and safe to do so. starting at 02/02 1355    Turn patient starting at 01/24 1800    Up in chair With meals starting at 01/24 1700    Progressive Mobility Protocol (mobilize patient to their highest level of functioning at least twice daily) starting at 01/24 1105    Elevate HOB 30 starting at 01/24 1105    Ambulate With Assistance, Post Op Day 0 If the patient arrives from PACU by 4PM, walk at least once on day of operation if alert and safe to do so. starting at 01/24 1104        Partial Code    Sonya Lainez NP  Neurocritical Care  [facility] - Neuro Critical Care    Critical condition in that Patient has a condition that poses threat to life and bodily function: spinal surgery requiring MAP >85 until, new gtt available     35 minutes of Critical care time was spent personally by me on the following activities: development of treatment plan with patient or surrogate and bedside caregivers, discussions with consultants, evaluation of patient's response to treatment, examination of patient, ordering and performing treatments and interventions, ordering and review of laboratory studies, ordering and review of radiographic studies, pulse oximetry, antibiotic titration if applicable, vasopressor titration if applicable, re-evaluation of patient's condition. This critical care time did not overlap with that of any other provider or involve time for any procedures. There is high probability for acute neurological change leading to clinical and possibly life-threatening  deterioration requiring highest level of physician preparedness for urgent intervention.

## 2023-02-04 NOTE — PROGRESS NOTES
[facility] Critical Care  Neurosurgery  Progress Note    Subjective:     History of Present Illness: Ms. Zazueta is a 42 y.o F w/ hx ofIV drug use (methamphetamine), chronic HCV with cirrhosis, spinal fusion (04/2021), epidural abscess with removal of hardware (02/2022), spinal fusion with hardware (T10 - Pelvis / 03/2022), obesity (BMI 39.3) and neurogenic bladder and recent shoulder surgery who initially presented to [facility] for evaluation of back pain and left leg weakness.  She reports initially noting the left leg weakness when she was about to go to the bathroom and was about to fall but was assisted by her boyfriend.  She was brought to the ED via EMS.  Urinalysis with UTI concerns and blood cultures at OSH grew ESBL E coli, and shoulder effusion concern for infection so she was treated with meropenem.  During hospitalization, she reports the weakness that started out in her left leg initially then spread upwards to her left thigh, left hip, then crossed over to the right hip and spread to her right leg.  Denies recent IV drug use. She reports her last incidence of drug use was more than 3 years ago and also denies tobacco, and alcohol abuse.  Reports cannabis use.     OSH course notable for concerning urinalysis and blood cultures positive for ESBL E coli treated with meropenem.  She was also admit to the ICU where she was treated with Precedex for significant body aches, irritability, and muscle spasms.  Concerns of a murmur on physical exam so she underwent TTE which did not show any vegetations.  Worsening lower extremity weakness workup with MRI head nonspecific, MRI cervical spine with multilevel spondylosis, X-ray spine with multilevel thoracolumbar fusion and diskectomy, focal kyphosis at T9-10 increased compared to prior.  She was started on prophylaxis amoxicillin due to her history of infected hardware.  MRI spine unable to be completed due to patient's body habitus so she was  [place] [facility] [place] and Neurosurgery, Neurology evaluation.      Post-Op Info:  Procedure(s) (LRB):  LAMINECTOMY, SPINE, THORACIC, WITH FUSION (T4-Pelvis fusion w/ T9-10 corpectomies) **AIRO (N/A)   2 Days Post-Op     Interval History: 2/4: NAEON, platelets improved to 195, drain output decreasing. Strength stable. MAP > 85    Medications:  Continuous Infusions:   hydromorphone in 0.9 % NaCl 6 mg/30 ml      phenylephrine 1.5 mcg/kg/min (02/04/23 0603)    phenylephrine       Scheduled Meds:   acetaminophen  1,000 mg Oral TID    buPROPion  300 mg Oral Daily    gabapentin  600 mg Oral TID    lactulose  20 g Oral TID    meropenem (MERREM) IVPB  2 g Intravenous Q8H    methocarbamoL  1,000 mg Oral QID    pantoprazole  40 mg Oral Daily    polyethylene glycol  17 g Oral BID    senna-docusate 8.6-50 mg  2 tablet Oral BID    sodium chloride 0.9%  10 mL Intravenous Q6H     PRN Meds:sodium chloride, sodium chloride, sodium chloride, sodium chloride, sodium chloride, sodium chloride, sodium chloride, dextrose 10%, dextrose 10%, diazePAM, glucagon (human recombinant), glucose, glucose, hydrALAZINE, labetalol, magnesium hydroxide 400 mg/5 ml, magnesium oxide, magnesium oxide, melatonin, naloxone, ondansetron, oxyCODONE, polyethylene glycol, potassium bicarbonate, potassium bicarbonate, potassium bicarbonate, potassium, sodium phosphates, potassium, sodium phosphates, potassium, sodium phosphates, prochlorperazine, sodium chloride 0.9%, Flushing PICC Protocol **AND** sodium chloride 0.9% **AND** sodium chloride 0.9%     Review of Systems  Objective:     Weight: 132 kg (291 lb 0.1 oz)  Body mass index is 45.58 kg/m².  Vital Signs (Most Recent):  Temp: 98.2 °F (36.8 °C) (02/04/23 0315)  Pulse: 107 (02/04/23 0540)  Resp: 19 (02/04/23 0540)  BP: (!) 105/59 (02/04/23 0315)  SpO2: 95 % (02/04/23 0540) Vital Signs (24h Range):  Temp:  [97.8 °F (36.6 °C)-98.5 °F (36.9 °C)] 98.2 °F (36.8 °C)  Pulse:   [107-138] 107  Resp:  [13-23] 19  SpO2:  [93 %-99 %] 95 %  BP: ()/(50-72) 105/59  Arterial Line BP: ()/(59-79) 98/59                              Neurosurgery Physical Exam  General: well developed, well nourished, no distress.   Head: normocephalic, atraumatic  Neurologic: Alert and oriented. Thought content appropriate.  GCS: Motor: 6/Verbal: 5/Eyes: 4 GCS Total: 15  Mental Status: Awake, Alert, Oriented x 4  Language: No aphasia  Speech: No dysarthria  Cranial nerves: face symmetric, tongue midline, CN II-XII grossly intact.   Eyes: pupils equal, round, reactive to light with accommodation, EOMI, nystagmus.   Pulmonary: normal respirations, no signs of respiratory distress  Abdomen: soft, non-distended, not tender to palpation  Skin: Skin is warm, dry and intact.  Sensory: intact to light touch throughout     Motor Strength:Moves all extremities spontaneously with good tone.  Full strength upper and extremities. No abnormal movements seen.      Strength   Deltoids Triceps Biceps Wrist Extension Wrist Flexion Hand    Upper: R 5/5 5/5 5/5 5/5 5/5 5/5     L 5/5 5/5 5/5 5/5 5/5 5/5       Iliopsoas Quadriceps Knee  Flexion Tibialis  anterior Gastro- cnemius EHL   Lower: R 3/5 4/5 4/5 4+/5 4+/5 4+/5     L 3/5 4/5 4/5 4+/5 4+/5 4+/5      Reflexes:   DTR: 2+ symmetrically throughout.  Parker's: Negative.  Babinski's: Present bilaterally  Clonus: 2 beats bilateral lower extremity     Incision well healed       Significant Labs:  Recent Labs   Lab 02/03/23  0159 02/04/23  0400   * 101    135*   K 4.6 3.7    104   CO2 24 24   BUN 22* 29*   CREATININE 0.5 0.4*   CALCIUM 7.6* 7.6*   MG 2.0 1.9       Recent Labs   Lab 02/02/23  1510 02/03/23  0159 02/04/23  0400   WBC 9.91 2.34* 12.44   HGB 7.8* 7.2* 7.5*   HCT 25.1* 22.8* 24.1*    77* 196       No results for input(s): LABPT, INR, APTT in the last 48 hours.    Microbiology Results (last 7 days)       Procedure Component Value Units  Date/Time    Fungus culture [[id number]] Collected: 01/24/23 0913    Order Status: Completed Specimen: Body Fluid from Back Updated: 02/02/23 0954     Fungus (Mycology) Culture Culture in progress    Narrative:      2) Perispinal    Fungus culture [[id number]] Collected: 01/24/23 0913    Order Status: Completed Specimen: Body Fluid from Back Updated: 02/02/23 0954     Fungus (Mycology) Culture Culture in progress    Narrative:      1) Perispinal    Fungus culture [[id number]] Collected: 01/24/23 0943    Order Status: Completed Specimen: Bone from Back Updated: 02/02/23 0954     Fungus (Mycology) Culture Culture in progress    Narrative:      3) Perispinal    Fungus culture [[id number]] Collected: 01/24/23 0943    Order Status: Completed Specimen: Bone from Back Updated: 02/02/23 0954     Fungus (Mycology) Culture Culture in progress    Narrative:      4) Perispinal    Blood culture [[id number]] Collected: 01/27/23 0120    Order Status: Completed Specimen: Blood from Peripheral, Forearm, Right Updated: 02/01/23 0612     Blood Culture, Routine No growth after 5 days.    Blood culture [[id number]] Collected: 01/27/23 0123    Order Status: Completed Specimen: Blood from Peripheral, Antecubital, Left Updated: 02/01/23 0612     Blood Culture, Routine No growth after 5 days.    Culture, Anaerobe [[id number]] Collected: 01/24/23 0943    Order Status: Completed Specimen: Bone from Back Updated: 01/30/23 0943     Anaerobic Culture No anaerobes isolated    Narrative:      4) Perispinal    Culture, Anaerobe [[id number]] Collected: 01/24/23 0943    Order Status: Completed Specimen: Bone from Back Updated: 01/30/23 0942     Anaerobic Culture No anaerobes isolated    Narrative:      3) Perispinal    Culture, Anaerobe [[id number]] Collected: 01/24/23 0913    Order Status: Completed Specimen: Body Fluid from Back Updated: 01/30/23 0942     Anaerobic Culture No anaerobes isolated    Narrative:      2) Perispinal    Culture, Anaerobe  [[id number]] Collected: 01/24/23 0913    Order Status: Completed Specimen: Body Fluid from Back Updated: 01/30/23 0859     Anaerobic Culture No anaerobes isolated    Narrative:      1) Perispinal    Aerobic culture [[id number]]  (Abnormal)  (Susceptibility) Collected: 01/24/23 0943    Order Status: Completed Specimen: Bone from Back Updated: 01/28/23 1145     Aerobic Bacterial Culture ESCHERICHIA COLI ESBL  From broth only      Narrative:      4) Perispinal          All pertinent labs from the last 24 hours have been reviewed.    Significant Diagnostics:  CT C-spine:  Vertebrae: The dens, lateral masses, and occipital condyles are intact.  There is no evidence of fracture or dislocation.     Discs: Multilevel disc height loss and degenerative endplate change.  Prominent C6 inferior endplate Schmorl's node, similar in appearance dating back to MRI from 02/14/2022.     Degenerative changes: Sent spinal canal dimensions at C3-C4 are 10.5 mm, 8.5 mm at C4-C5, 10.0 mm at C5-C6, and 10.8 mm at C6-C7 .  Multilevel uncovertebral spurring with multilevel neural foraminal narrowing most pronounced at C5-C6 with moderate right neural foraminal narrowing and C6-C7 with moderate left neural foraminal narrowing.    Physical Exam  Neurosurgery Physical Exam    Assessment/Plan:     * Weakness of both lower extremities  Pt is 42F multiple spinal surgeries and revisions last T10- Pelvis in March 2022 who presented to OSH with concern for shoulder infection and UTI found to have E coli sepsis who has had progressive worsening BLE weakness, XR showed possible worsening proximal junctional kyphotic deformity. Transferred to [facility] to  and neurosurgery was consulted    OR 1/24 for temporary T6-12 PSIF. Taz pus noted intraoperatively.   OR 2/2 for T4-pelvis revision and extension of fusion with T9-10 corpectomy with plastic surgery assistance       Plan:  --Admitted to ICU,   --Maps > 85  -- MRI C/T/L spine 1/20 with C7 SP enhancement,  focal kyphosis at T8-10 with severe anterior height loss at T9, enhancing C6 inferior endplate Schmorl's node  -- CT T/L spine thin cut 1/20 with haloing of pelvic and T10 screws, spondylodiscitis T8-9, T9-10  -- CT C spine with concern for discitis at C6-7  -- flex ext XR done, limited view of C6-7 in swimmers view 2/2 shoulders  --post op xrays pending   -- 1/20 ESR 70, CRP 9.4. elevated from prior   -- TLSO at bedside  --MIHIR drains x4, monitor output   -- Can liberalize platelet goals if Hb stays stable and drain output continues to decrease  --ID: merropenam   -- BCx 1/20: NGTD   -- OR cultures 1/24: E. coli   -- multimodal pain control per primary medicine team  --NSGY will continue to follow. Please contact team with any further questions.        Mabel Chang MD  Neurosurgery  [facility] - [facility] Critical [facility]

## 2023-02-05 LAB
ALBUMIN SERPL BCP-MCNC: 2 G/DL (ref 3.5–5.2)
ALP SERPL-CCNC: 122 U/L (ref 55–135)
ALT SERPL W/O P-5'-P-CCNC: 33 U/L (ref 10–44)
ANION GAP SERPL CALC-SCNC: 4 MMOL/L (ref 8–16)
AST SERPL-CCNC: 46 U/L (ref 10–40)
BASOPHILS # BLD AUTO: 0.1 K/UL (ref 0–0.2)
BASOPHILS NFR BLD: 0.9 % (ref 0–1.9)
BILIRUB SERPL-MCNC: 2.1 MG/DL (ref 0.1–1)
BUN SERPL-MCNC: 24 MG/DL (ref 6–20)
CALCIUM SERPL-MCNC: 7.7 MG/DL (ref 8.7–10.5)
CHLORIDE SERPL-SCNC: 106 MMOL/L (ref 95–110)
CO2 SERPL-SCNC: 26 MMOL/L (ref 23–29)
CREAT SERPL-MCNC: 0.4 MG/DL (ref 0.5–1.4)
DIFFERENTIAL METHOD: ABNORMAL
EOSINOPHIL # BLD AUTO: 0.8 K/UL (ref 0–0.5)
EOSINOPHIL NFR BLD: 7 % (ref 0–8)
ERYTHROCYTE [DISTWIDTH] IN BLOOD BY AUTOMATED COUNT: 20.4 % (ref 11.5–14.5)
EST. GFR  (NO RACE VARIABLE): >60 ML/MIN/1.73 M^2
GLUCOSE SERPL-MCNC: 118 MG/DL (ref 70–110)
HCT VFR BLD AUTO: 24.5 % (ref 37–48.5)
HGB BLD-MCNC: 7.5 G/DL (ref 12–16)
IMM GRANULOCYTES # BLD AUTO: 0.05 K/UL (ref 0–0.04)
IMM GRANULOCYTES NFR BLD AUTO: 0.4 % (ref 0–0.5)
LYMPHOCYTES # BLD AUTO: 1.8 K/UL (ref 1–4.8)
LYMPHOCYTES NFR BLD: 15.7 % (ref 18–48)
MAGNESIUM SERPL-MCNC: 1.9 MG/DL (ref 1.6–2.6)
MCH RBC QN AUTO: 28.6 PG (ref 27–31)
MCHC RBC AUTO-ENTMCNC: 30.6 G/DL (ref 32–36)
MCV RBC AUTO: 94 FL (ref 82–98)
MONOCYTES # BLD AUTO: 1.6 K/UL (ref 0.3–1)
MONOCYTES NFR BLD: 14 % (ref 4–15)
NEUTROPHILS # BLD AUTO: 7 K/UL (ref 1.8–7.7)
NEUTROPHILS NFR BLD: 62 % (ref 38–73)
NRBC BLD-RTO: 0 /100 WBC
PHOSPHATE SERPL-MCNC: 1.9 MG/DL (ref 2.7–4.5)
PLATELET # BLD AUTO: 178 K/UL (ref 150–450)
PMV BLD AUTO: 8.9 FL (ref 9.2–12.9)
POTASSIUM SERPL-SCNC: 4 MMOL/L (ref 3.5–5.1)
PROT SERPL-MCNC: 4.9 G/DL (ref 6–8.4)
RBC # BLD AUTO: 2.62 M/UL (ref 4–5.4)
SODIUM SERPL-SCNC: 136 MMOL/L (ref 136–145)
WBC # BLD AUTO: 11.22 K/UL (ref 3.9–12.7)

## 2023-02-05 PROCEDURE — 94799 UNLISTED PULMONARY SVC/PX: CPT

## 2023-02-05 PROCEDURE — 63600175 PHARM REV CODE 636 W HCPCS: Performed by: STUDENT IN AN ORGANIZED HEALTH CARE EDUCATION/TRAINING PROGRAM

## 2023-02-05 PROCEDURE — 25000003 PHARM REV CODE 250: Performed by: NURSE PRACTITIONER

## 2023-02-05 PROCEDURE — 25000003 PHARM REV CODE 250

## 2023-02-05 PROCEDURE — 83735 ASSAY OF MAGNESIUM: CPT | Performed by: STUDENT IN AN ORGANIZED HEALTH CARE EDUCATION/TRAINING PROGRAM

## 2023-02-05 PROCEDURE — 99900035 HC TECH TIME PER 15 MIN (STAT)

## 2023-02-05 PROCEDURE — 97168 OT RE-EVAL EST PLAN CARE: CPT

## 2023-02-05 PROCEDURE — 97164 PT RE-EVAL EST PLAN CARE: CPT

## 2023-02-05 PROCEDURE — 25000003 PHARM REV CODE 250: Performed by: PSYCHIATRY & NEUROLOGY

## 2023-02-05 PROCEDURE — 99233 PR SUBSEQUENT HOSPITAL CARE,LEVL III: ICD-10-PCS | Mod: ,,, | Performed by: NURSE PRACTITIONER

## 2023-02-05 PROCEDURE — 27000221 HC OXYGEN, UP TO 24 HOURS

## 2023-02-05 PROCEDURE — A4216 STERILE WATER/SALINE, 10 ML: HCPCS | Performed by: PSYCHIATRY & NEUROLOGY

## 2023-02-05 PROCEDURE — 63600175 PHARM REV CODE 636 W HCPCS

## 2023-02-05 PROCEDURE — 97110 THERAPEUTIC EXERCISES: CPT

## 2023-02-05 PROCEDURE — 85025 COMPLETE CBC W/AUTO DIFF WBC: CPT

## 2023-02-05 PROCEDURE — 63600175 PHARM REV CODE 636 W HCPCS: Performed by: NURSE PRACTITIONER

## 2023-02-05 PROCEDURE — 99233 SBSQ HOSP IP/OBS HIGH 50: CPT | Mod: ,,, | Performed by: NURSE PRACTITIONER

## 2023-02-05 PROCEDURE — 25000003 PHARM REV CODE 250: Performed by: STUDENT IN AN ORGANIZED HEALTH CARE EDUCATION/TRAINING PROGRAM

## 2023-02-05 PROCEDURE — 84100 ASSAY OF PHOSPHORUS: CPT | Performed by: STUDENT IN AN ORGANIZED HEALTH CARE EDUCATION/TRAINING PROGRAM

## 2023-02-05 PROCEDURE — 25000003 PHARM REV CODE 250: Performed by: INTERNAL MEDICINE

## 2023-02-05 PROCEDURE — 97530 THERAPEUTIC ACTIVITIES: CPT

## 2023-02-05 PROCEDURE — 27000207 HC ISOLATION

## 2023-02-05 PROCEDURE — 94761 N-INVAS EAR/PLS OXIMETRY MLT: CPT

## 2023-02-05 PROCEDURE — 20000000 HC ICU ROOM

## 2023-02-05 PROCEDURE — 80053 COMPREHEN METABOLIC PANEL: CPT | Performed by: STUDENT IN AN ORGANIZED HEALTH CARE EDUCATION/TRAINING PROGRAM

## 2023-02-05 RX ORDER — LACTULOSE 10 G/15ML
200 SOLUTION ORAL; RECTAL ONCE
Status: COMPLETED | OUTPATIENT
Start: 2023-02-05 | End: 2023-02-05

## 2023-02-05 RX ORDER — BISACODYL 10 MG
10 SUPPOSITORY, RECTAL RECTAL DAILY
Status: DISCONTINUED | OUTPATIENT
Start: 2023-02-05 | End: 2023-02-06

## 2023-02-05 RX ADMIN — SENNOSIDES AND DOCUSATE SODIUM 2 TABLET: 50; 8.6 TABLET ORAL at 09:02

## 2023-02-05 RX ADMIN — METHOCARBAMOL 1000 MG: 500 TABLET ORAL at 09:02

## 2023-02-05 RX ADMIN — METHOCARBAMOL 1000 MG: 500 TABLET ORAL at 08:02

## 2023-02-05 RX ADMIN — LACTULOSE 20 G: 20 SOLUTION ORAL at 09:02

## 2023-02-05 RX ADMIN — LACTULOSE 200 G: 10 SOLUTION ORAL at 09:02

## 2023-02-05 RX ADMIN — PHENYLEPHRINE HYDROCHLORIDE 2.5 MCG/KG/MIN: 10 INJECTION INTRAVENOUS at 08:02

## 2023-02-05 RX ADMIN — GABAPENTIN 600 MG: 300 CAPSULE ORAL at 08:02

## 2023-02-05 RX ADMIN — Medication 10 ML: at 12:02

## 2023-02-05 RX ADMIN — METHOCARBAMOL 1000 MG: 500 TABLET ORAL at 12:02

## 2023-02-05 RX ADMIN — Medication 10 ML: at 06:02

## 2023-02-05 RX ADMIN — BUPROPION HYDROCHLORIDE 300 MG: 300 TABLET, FILM COATED, EXTENDED RELEASE ORAL at 08:02

## 2023-02-05 RX ADMIN — MEROPENEM 2 G: 1 INJECTION INTRAVENOUS at 09:02

## 2023-02-05 RX ADMIN — DIAZEPAM 5 MG: 5 TABLET ORAL at 12:02

## 2023-02-05 RX ADMIN — PHENYLEPHRINE HYDROCHLORIDE 2.5 MCG/KG/MIN: 10 INJECTION INTRAVENOUS at 02:02

## 2023-02-05 RX ADMIN — ACETAMINOPHEN 1000 MG: 500 TABLET ORAL at 08:02

## 2023-02-05 RX ADMIN — POTASSIUM & SODIUM PHOSPHATES POWDER PACK 280-160-250 MG 2 PACKET: 280-160-250 PACK at 11:02

## 2023-02-05 RX ADMIN — POLYETHYLENE GLYCOL 3350 17 G: 17 POWDER, FOR SOLUTION ORAL at 09:02

## 2023-02-05 RX ADMIN — LACTULOSE 20 G: 20 SOLUTION ORAL at 08:02

## 2023-02-05 RX ADMIN — Medication: at 02:02

## 2023-02-05 RX ADMIN — ACETAMINOPHEN 1000 MG: 500 TABLET ORAL at 09:02

## 2023-02-05 RX ADMIN — MEROPENEM 2 G: 1 INJECTION INTRAVENOUS at 02:02

## 2023-02-05 RX ADMIN — GABAPENTIN 600 MG: 300 CAPSULE ORAL at 03:02

## 2023-02-05 RX ADMIN — HEPARIN SODIUM 5000 UNITS: 5000 INJECTION INTRAVENOUS; SUBCUTANEOUS at 09:02

## 2023-02-05 RX ADMIN — PANTOPRAZOLE SODIUM 40 MG: 40 TABLET, DELAYED RELEASE ORAL at 08:02

## 2023-02-05 RX ADMIN — METHYLNALTREXONE BROMIDE 19.8 MG: 12 INJECTION, SOLUTION SUBCUTANEOUS at 09:02

## 2023-02-05 RX ADMIN — HEPARIN SODIUM 5000 UNITS: 5000 INJECTION INTRAVENOUS; SUBCUTANEOUS at 05:02

## 2023-02-05 RX ADMIN — METHOCARBAMOL 1000 MG: 500 TABLET ORAL at 04:02

## 2023-02-05 RX ADMIN — GABAPENTIN 600 MG: 300 CAPSULE ORAL at 09:02

## 2023-02-05 RX ADMIN — ACETAMINOPHEN 1000 MG: 500 TABLET ORAL at 03:02

## 2023-02-05 RX ADMIN — POTASSIUM & SODIUM PHOSPHATES POWDER PACK 280-160-250 MG 2 PACKET: 280-160-250 PACK at 09:02

## 2023-02-05 RX ADMIN — MEROPENEM 2 G: 1 INJECTION INTRAVENOUS at 06:02

## 2023-02-05 RX ADMIN — OXYCODONE HYDROCHLORIDE 10 MG: 10 TABLET ORAL at 08:02

## 2023-02-05 RX ADMIN — ONDANSETRON 4 MG: 2 INJECTION INTRAMUSCULAR; INTRAVENOUS at 09:02

## 2023-02-05 RX ADMIN — POLYETHYLENE GLYCOL 3350 17 G: 17 POWDER, FOR SOLUTION ORAL at 08:02

## 2023-02-05 RX ADMIN — HEPARIN SODIUM 5000 UNITS: 5000 INJECTION INTRAVENOUS; SUBCUTANEOUS at 03:02

## 2023-02-05 RX ADMIN — SENNOSIDES AND DOCUSATE SODIUM 2 TABLET: 50; 8.6 TABLET ORAL at 08:02

## 2023-02-05 RX ADMIN — LACTULOSE 20 G: 20 SOLUTION ORAL at 03:02

## 2023-02-05 RX ADMIN — BISACODYL 10 MG: 10 SUPPOSITORY RECTAL at 04:02

## 2023-02-05 NOTE — PROGRESS NOTES
[facility] Critical Care  Neurocritical Care  Progress Note    Admit Date: 1/19/2023  Service Date: 02/05/2023  Length of Stay: 17    Subjective:     Chief Complaint: Weakness of both lower extremities    History of Present Illness: 42-year-old female with a history of IV drug use, chronic hep C with cirrhosis, spinal fusion in April 2021, complicated by epidural abscess with removal of hardware February 2022 and against spinal fusion in March 2022 of T10 through the pelvis as well as neurogenic bladder who initially presented to [facility] in late December for generalized weakness, fatigue and bilateral flank pain.  Her course at [facility] was complicated by a E coli bacteremia thought to be due to a urinary source, she is completed a 7 day course of meropenem on 1/3, and had bcx from 1/2 that did not grow any bacteria.       She also had an ICU admission for ongoing back spasms and agitation requiring Precedex.  In early January patient started developing progressive lower extremity weakness greater in the left compared to the right.  She also had an MRI at the outside hospital that showed some white matter changes concerning for possible demyelinating disease.  However her thoracic spine radiographs also showed worsening of her kyphosis at T9-T10.  Due to the concern for either a demyelinating polyneuropathy, MS, or spinal cord compression the patient was transferred to [facility] for neurosurgical evaluation.       On admission the patient had a CT lumbar and thoracic spine and an MRI CTL spine. The MRI showed  focal kyphosis T8 through T10, with possible associated cord edema signal.  As well as extensive edema throughout the posterior paraspinal musculature concerning for possible infectious or inflammatory process.  There was concern that the findings at T8 through T10 could correspond with compressive myelopathy.  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  There were also erosive changes involving the sacroiliac joints bilaterally which was concerning for sacroiliitis and could be infectious.     Patient underwent laminectomy T8-T10; posterior fusion T8-T12; and washout by NSGY today. No complications noted during surgery. Patient is HDS and on 3L NC. Patient is currently on a PCA dilaudid pump.            Hospital Course: 1/25/2023 NAEO, pain well controlled on current regimen. Continue ABX. Maintain logroll precautions and HOB <30 until second stage of surgery.  01/26/2023 Hemoglobin 6.6, repeat 6.2. Increased drainage noted from bilateral hemovac. S/p 1u pRBC. PICC team consulted for long term access for antibiotics, remove IJ central line when PICC placed.   01/27/2023: NAEON. HV to gravity with decreased drainge. Hgb 7.2 this AM, will trend CBC q12h. Lytes replaced. Bowel regimen adjusted.   01/28/2023: NAEON. Received 1 unit PBRC yesterday. Hgb 7.4 this AM. Plan for OR 2/2.   01/29/2023: NAEON. HV drainage decreasing. Hgb stable. Plan for OR 2/2  01/30/2023  Stable exam. Significant solid BM's  01/31/2023  Exam stable, patient in much better spirits today  02/01/2023  Exam stable  02/02/2023  Post-operatively heavily sedated. Oral airway in place  02/03/2023  Doing very well post operatively. Advance care as appropriate  2/4/2023: d/c gu cath, continue MAP goal until Sunday per NSGY team, new gtt available  2/5/2023: Continue MAP goals until this evening, stepdown to NSGY team tomorrow          Review of Systems  Constitutional: Denies fevers, weight loss, chills, or weakness.  Eyes: Denies changes in vision.  ENT: Denies dysphagia, nasal discharge, ear pain or discharge.  Cardiovascular: Denies chest pain, palpitations, orthopnea, or claudication.  Respiratory: Denies shortness of breath, cough, hemoptysis, or wheezing.  GI: Denies nausea/vomitting, hematochezia, melena, abd pain, or  changes in appetite.  : Denies dysuria, incontinence, or hematuria.  Musculoskeletal: Denies joint pain or myalgias.  Skin/breast: Denies rashes, lumps, lesions, or discharge.  Neurologic: Denies headache, dizziness, vertigo, or paresthesias.  Psychiatric: Denies changes in mood or hallucinations.  Endocrine: Denies polyuria, polydipsia, heat/cold intolerance.  Hematologic/Lymph: Denies lymphadenopathy, easy bruising or easy bleeding.  Allergic/Immunologic: Denies rash, rhinitis.    Objective:     Vitals:  Temp: 98.7 °F (37.1 °C)  Pulse: 100  Rhythm: normal sinus rhythm  BP: 110/67  MAP (mmHg): 83  Resp: 17  SpO2: 97 %    Temp  Min: 97.9 °F (36.6 °C)  Max: 99 °F (37.2 °C)  Pulse  Min: 87  Max: 108  BP  Min: 104/58  Max: 139/63  MAP (mmHg)  Min: 76  Max: 92  Resp  Min: 10  Max: 24  SpO2  Min: 94 %  Max: 98 %    02/04 0701 - 02/05 0700  In: 3499.1 [I.V.:2309.9]  Out: 1496 [Urine:750; Drains:746]   Unmeasured Output  Urine Occurrence: 1  Stool Occurrence: 0  Pad Count: 1       Physical Exam  GA: Alert, comfortable, no acute distress.   HEENT: No scleral icterus or JVD.   Pulmonary: Clear to auscultation A/L.   Cardiac: RRR S1 & S2 w/o rubs/murmurs/gallops.   Abdominal: Bowel sounds present x 4. No appreciable hepatosplenomegaly.  Skin: No jaundice, rashes, or visible lesions.  Neuro:  --GCS: E4 V5 M6  --Mental Status:  awake,   --CN II-XII grossly intact.   --Pupils 4mm, PERRL.   --Corneal reflex, gag, cough intact.  --FOWLER spont    Medications:  Continuoushydromorphone in 0.9 % NaCl 6 mg/30 ml  phenylephrine, Last Rate: 2.5 mcg/kg/min (02/05/23 1301)    Scheduledacetaminophen, 1,000 mg, TID  buPROPion, 300 mg, Daily  gabapentin, 600 mg, TID  heparin (porcine), 5,000 Units, Q8H  lactulose, 20 g, TID  meropenem (MERREM) IVPB, 2 g, Q8H  methocarbamoL, 1,000 mg, QID  pantoprazole, 40 mg, Daily  polyethylene glycol, 17 g, BID  senna-docusate 8.6-50 mg, 2 tablet, BID  sodium chloride 0.9%, 10 mL, Q6H    PRNsodium chloride,  , Q24H PRN  sodium chloride, , Q24H PRN  sodium chloride, , Q24H PRN  sodium chloride, , Q24H PRN  sodium chloride, , Q24H PRN  sodium chloride, , Q24H PRN  sodium chloride, , Q24H PRN  dextrose 10%, 12.5 g, PRN  dextrose 10%, 25 g, PRN  diazePAM, 5 mg, Q6H PRN  glucagon (human recombinant), 1 mg, PRN  glucose, 16 g, PRN  glucose, 24 g, PRN  hydrALAZINE, 10 mg, Q6H PRN  labetalol, 10 mg, Q4H PRN  magnesium hydroxide 400 mg/5 ml, 30 mL, Daily PRN  magnesium oxide, 800 mg, PRN  magnesium oxide, 800 mg, PRN  melatonin, 6 mg, Nightly PRN  naloxone, 0.02 mg, PRN  ondansetron, 4 mg, Q6H PRN  oxyCODONE, 10 mg, Q4H PRN  polyethylene glycol, 17 g, Daily PRN  potassium bicarbonate, 35 mEq, PRN  potassium bicarbonate, 50 mEq, PRN  potassium bicarbonate, 60 mEq, PRN  potassium, sodium phosphates, 2 packet, PRN  potassium, sodium phosphates, 2 packet, PRN  potassium, sodium phosphates, 2 packet, PRN  prochlorperazine, 2.5 mg, Q6H PRN  sodium chloride 0.9%, 10 mL, Q12H PRN  sodium chloride 0.9%, 10 mL, PRN      Today I personally reviewed pertinent medications, lines/drains/airways, imaging, cardiology results, laboratory results, microbiology results,     Diet  Diet Adult Regular (IDDSI Level 7)            Assessment/Plan:     Psychiatric  Anxiety and depression  Continue Buproprion for depression  Continue Lorazepam for anxiety  Continue Gabapentin for neuropathy and anxiety    Substance use disorder  Denies IV drug use (meth) for past 3 years.  Denies alcohol and tobacco abuse.       GI  Chronic hepatitis C with cirrhosis  Hx of Hep C. See Cirrhosis of Liver    Cirrhosis of liver with ascites  Patient has reportedly refused transplant evaluation in the past.   -Daily CMP and trend LFTs  -Continue Lactulose 20g TID   -Will need GI/hepatology follow up outpatient    MELD-Na score: 15 at 2/3/2023  1:59 AM  MELD score: 14 at 2/3/2023  1:59 AM  Calculated from:  Serum Creatinine: 0.5 mg/dL (Using min of 1 mg/dL) at 2/3/2023  1:59  AM  Serum Sodium: 136 mmol/L at 2/3/2023  1:59 AM  Total Bilirubin: 4.6 mg/dL at 2/3/2023  1:59 AM  INR(ratio): 1.2 at 2/1/2023 11:13 AM  Age: 42 years    Orthopedic  * Weakness of both lower extremities  43 yo F w/ history of multiple spinal surgeries presented to OSH with possible infection of patient's shoulder and found to have UTI and E. Coli bacteremia and progressively worse B/L LE weakness. Transferred to [facility] for neurosurgical evaluation. Underwent Laminectomy T8-T10; Posterior spinal fusion T8-T12; hardware removal and washout. Admitted to [facility] for HLOC. Arrived to unit on PCA dilaudid pump. Patient is HDS on 3L NC.   - [facility]  - NSGY, ID following  - q2hr neuro checks, vitals, I/Os  - HV drains x 2 in place, management per NSGY  -Transfuse for Hg<7  - Cultures positive for ESBL, blood cultures with NGTD. WCTM  - Antibiotics per ID recs  - CMP, Mag, Phos, CBC daily  - MAP goals > 65, SBP < 180  - PRN labetalol, hydralazine  - MM pain regimen; PCA Dilaudid pump for pain management  - Patient will need to lie flat with HOB 15-20°   - Aggressive bowel regimen  - PT/OT as appropriate   - VTE prophylaxis: mechanical, hold chemical given pancytopenia  - post operative management - POD#1  - f/u with nsgy regarding post-operative positional and mobility restrictions  2/5/2023: MAP >85, suri gtt available   Last day of MAPs goal  Plan to stepdown to NSGY tomorrow            The patient is being Prophylaxed for:  Venous Thromboembolism with: Chemical  Stress Ulcer with: PPI  Ventilator Pneumonia with: not applicable    Activity Orders          Diet Adult Regular (IDDSI Level 7): Regular starting at 02/03 1024    Up in chair With meals starting at 02/02 1700    Progressive Mobility Protocol (mobilize patient to their highest level of functioning at least twice daily) starting at 02/02 1356    Elevate HOB 30 starting at 02/02 1356    Ambulate With Assistance, Post Op Day 0 If the patient arrives from PACU by 4PM,  walk at least once on day of operation if alert and safe to do so. starting at 02/02 1355    Turn patient starting at 01/24 1800    Up in chair With meals starting at 01/24 1700    Progressive Mobility Protocol (mobilize patient to their highest level of functioning at least twice daily) starting at 01/24 1105    Elevate HOB 30 starting at 01/24 1105    Ambulate With Assistance, Post Op Day 0 If the patient arrives from PACU by 4PM, walk at least once on day of operation if alert and safe to do so. starting at 01/24 1104        Partial Code    Sonya Lainez NP  Neurocritical Care  [facility] - Neuro Critical Care

## 2023-02-05 NOTE — SUBJECTIVE & OBJECTIVE
Review of Systems  Constitutional: Denies fevers, weight loss, chills, or weakness.  Eyes: Denies changes in vision.  ENT: Denies dysphagia, nasal discharge, ear pain or discharge.  Cardiovascular: Denies chest pain, palpitations, orthopnea, or claudication.  Respiratory: Denies shortness of breath, cough, hemoptysis, or wheezing.  GI: Denies nausea/vomitting, hematochezia, melena, abd pain, or changes in appetite.  : Denies dysuria, incontinence, or hematuria.  Musculoskeletal: Denies joint pain or myalgias.  Skin/breast: Denies rashes, lumps, lesions, or discharge.  Neurologic: Denies headache, dizziness, vertigo, or paresthesias.  Psychiatric: Denies changes in mood or hallucinations.  Endocrine: Denies polyuria, polydipsia, heat/cold intolerance.  Hematologic/Lymph: Denies lymphadenopathy, easy bruising or easy bleeding.  Allergic/Immunologic: Denies rash, rhinitis.    Objective:     Vitals:  Temp: 98.7 °F (37.1 °C)  Pulse: 100  Rhythm: normal sinus rhythm  BP: 110/67  MAP (mmHg): 83  Resp: 17  SpO2: 97 %    Temp  Min: 97.9 °F (36.6 °C)  Max: 99 °F (37.2 °C)  Pulse  Min: 87  Max: 108  BP  Min: 104/58  Max: 139/63  MAP (mmHg)  Min: 76  Max: 92  Resp  Min: 10  Max: 24  SpO2  Min: 94 %  Max: 98 %    02/04 0701 - 02/05 0700  In: 3499.1 [I.V.:2309.9]  Out: 1496 [Urine:750; Drains:746]   Unmeasured Output  Urine Occurrence: 1  Stool Occurrence: 0  Pad Count: 1       Physical Exam  GA: Alert, comfortable, no acute distress.   HEENT: No scleral icterus or JVD.   Pulmonary: Clear to auscultation A/L.   Cardiac: RRR S1 & S2 w/o rubs/murmurs/gallops.   Abdominal: Bowel sounds present x 4. No appreciable hepatosplenomegaly.  Skin: No jaundice, rashes, or visible lesions.  Neuro:  --GCS: E4 V5 M6  --Mental Status:  awake,   --CN II-XII grossly intact.   --Pupils 4mm, PERRL.   --Corneal reflex, gag, cough intact.  --FOWLER spont    Medications:  Continuoushydromorphone in 0.9 % NaCl 6 mg/30 ml  phenylephrine, Last Rate:  2.5 mcg/kg/min (02/05/23 1301)    Scheduledacetaminophen, 1,000 mg, TID  buPROPion, 300 mg, Daily  gabapentin, 600 mg, TID  heparin (porcine), 5,000 Units, Q8H  lactulose, 20 g, TID  meropenem (MERREM) IVPB, 2 g, Q8H  methocarbamoL, 1,000 mg, QID  pantoprazole, 40 mg, Daily  polyethylene glycol, 17 g, BID  senna-docusate 8.6-50 mg, 2 tablet, BID  sodium chloride 0.9%, 10 mL, Q6H    PRNsodium chloride, , Q24H PRN  sodium chloride, , Q24H PRN  sodium chloride, , Q24H PRN  sodium chloride, , Q24H PRN  sodium chloride, , Q24H PRN  sodium chloride, , Q24H PRN  sodium chloride, , Q24H PRN  dextrose 10%, 12.5 g, PRN  dextrose 10%, 25 g, PRN  diazePAM, 5 mg, Q6H PRN  glucagon (human recombinant), 1 mg, PRN  glucose, 16 g, PRN  glucose, 24 g, PRN  hydrALAZINE, 10 mg, Q6H PRN  labetalol, 10 mg, Q4H PRN  magnesium hydroxide 400 mg/5 ml, 30 mL, Daily PRN  magnesium oxide, 800 mg, PRN  magnesium oxide, 800 mg, PRN  melatonin, 6 mg, Nightly PRN  naloxone, 0.02 mg, PRN  ondansetron, 4 mg, Q6H PRN  oxyCODONE, 10 mg, Q4H PRN  polyethylene glycol, 17 g, Daily PRN  potassium bicarbonate, 35 mEq, PRN  potassium bicarbonate, 50 mEq, PRN  potassium bicarbonate, 60 mEq, PRN  potassium, sodium phosphates, 2 packet, PRN  potassium, sodium phosphates, 2 packet, PRN  potassium, sodium phosphates, 2 packet, PRN  prochlorperazine, 2.5 mg, Q6H PRN  sodium chloride 0.9%, 10 mL, Q12H PRN  sodium chloride 0.9%, 10 mL, PRN      Today I personally reviewed pertinent medications, lines/drains/airways, imaging, cardiology results, laboratory results, microbiology results,     Diet  Diet Adult Regular (IDDSI Level 7)

## 2023-02-05 NOTE — PT/OT/SLP RE-EVAL
Physical Therapy Re-evaluation    Patient Name:  Rachel Zazueta   MRN:  [id number]  Co-evaluation w/ OT 2/2 suspected pt complexity and requirement of assistance from 2 skilled therapists to maximize treatment potential and maintain pt safety  Recommendations:     Discharge Recommendations: nursing facility, skilled  Discharge Equipment Recommendations: hospital bed, lift device   Barriers to discharge: Inaccessible home and Decreased caregiver support    Assessment:     Rachel Zazueta is a 42 y.o. female admitted with a medical diagnosis of Weakness of both lower extremities.  She presents with the following impairments/functional limitations: weakness, impaired endurance, impaired self care skills, impaired functional mobility, impaired balance, decreased upper extremity function, decreased lower extremity function, decreased safety awareness, pain, decreased ROM, impaired coordination, impaired skin, edema, impaired cardiopulmonary response to activity, orthopedic precautions. Pt reporting severe pain w/ functional mobility limited 2/2 gross edema throughout body w/ pt stating her bloating was preventing her from sitting up straight, w/ inability to get pt into upright sitting w/ pt pushing back onto therapist leading to hips sliding forward and pt having to be returned to supine to prevent her from sliding forward off bed. Rec d/c to SNF for continued treatment in order to maximize functional return as pt w/ good motivation and w/ potential for significant improvements in functional mobility.      Rehab Prognosis:  good; patient would benefit from acute skilled PT services to address these deficits and reach maximum level of function.      Recent Surgery: Procedure(s) (LRB):  LAMINECTOMY, SPINE, THORACIC, WITH FUSION (T4-Pelvis fusion w/ T9-10 corpectomies) **AIRO (N/A) 3 Days Post-Op    Plan:     During this hospitalization, patient to be seen 3 x/week to address the above listed problems via therapeutic  activities, therapeutic exercises, neuromuscular re-education, wheelchair management/training  Plan of Care Expires:  03/05/23  Plan of Care Reviewed with: patient    Subjective     Communicated with RN prior to session.  Patient found HOB elevated with PureWick, telemetry, peripheral IV, MIHIR drain, PCA, blood pressure cuff, pulse ox (continuous), wound vac upon PT entry to room, agreeable to evaluation.      Chief Complaint: pain in back  Patient comments/goals: regain mobility  Pain/Comfort:  Pain Rating 1: 8/10  Location 1: back  Pain Addressed 1: Pre-medicate for activity, Reposition, Distraction, Cessation of Activity, Nurse notified  Pain Rating Post-Intervention 1: 8/10    Patients cultural, spiritual, Buddhism conflicts given the current situation: no      Objective:     Patient found with: PureWick, telemetry, peripheral IV, MIHIR drain, PCA, blood pressure cuff, pulse ox (continuous), wound vac     General Precautions: Standard, contact, fall  Orthopedic Precautions: spinal precautions  Braces: TLSO  Respiratory Status: Room air    Exams:  Postural Exam:  Patient presented with the following abnormalities:    -       inability to bring hips to 90 degrees 2/2 bloating in stomach/soft tissue approximation  Skin Integrity/Edema:      -       weaping edema in BLE RLE>LLE  RLE ROM: Deficits: severely limited 2/2 edema and STA  RLE Strength: Deficits: 2-/5 grossly  LLE ROM: Deficits: severely limited 2/2 edema and STA  LLE Strength: Deficits: 2-/5 grossly    Functional Mobility:  Bed Mobility:     Rolling Left:  total assistance and of 2 persons  Rolling Right: total assistance and of 2 persons  Supine to Sit: total assistance and of 2-3 persons  Sit to Supine: total assistance and of 3 persons  Balance: EOB sitting total A x2 people 2/2 pt pushing posteriorly w/ B hips sliding forward    AM-PAC 6 CLICK MOBILITY  Total Score:7       Treatment and Education:  Pt educated on PT POC/goals, d/c recs, and continued  treatment. All questions answered and pt in agreement w/ POC.   Sitting EOB x5min w/ max cueing for postural control, muscle activation, and deep breathing  Rolling L/R in bed to reposition pt w/ wedges and pillows to offload pressure, avoid muscle contractures, and prevent skin breakdown. Pt and family educated on importance of pt being rotated every 2hrs for these reasons.   PT educated patient on spinal precautions: no bending, lifting >10lbs (~1 gallon of milk), or twisting, w/ acronym no BLT to assist w/ remembering precautions. Patient verbalized understanding of spinal precautions and that these precautions will remain in place until MD clears pt to return to these activities.     Patient left HOB elevated with all lines intact, call button in reach, bed alarm on, and RN present.    GOALS:   Multidisciplinary Problems       Physical Therapy Goals          Problem: Physical Therapy    Goal Priority Disciplines Outcome Goal Variances Interventions   Physical Therapy Goal     PT, PT/OT Ongoing, Progressing     Description: Goals to be met by: 23     Patient will increase functional independence with mobility by performin. Supine to sit with Moderate Assistance  2. Sit to supine with Moderate Assistance  3. Rolling to Left and Right with Moderate Assistance.  4. Sit to stand transfer with Maximum Assistance                         History:     Past Medical History:   Diagnosis Date    Anemia     Anxiety     Depressed     Diskitis     Hep C w/o coma, chronic     IV drug abuse     Kidney stone     Liver cirrhosis        Past Surgical History:   Procedure Laterality Date    ARTHROTOMY OF SHOULDER Left 11/15/2022    Procedure: ARTHROTOMY, SHOULDER;  Surgeon: Bjorn Hayes MD;  Location: [facility];  Service: Orthopedics;  Laterality: Left;  Left acromioclavicular joint arthrotomy    BACK SURGERY      benine tumor removal      forehead, age 9    CHOLECYSTECTOMY      KIDNEY SURGERY      LUMBAR FUSION Bilateral  3/2/2022    Procedure: FUSION, SPINE, LUMBAR;  Surgeon: Guille Templeton MD;  Location: [facility] OR 2ND FLR;  Service: Neurosurgery;  Laterality: Bilateral;  AIRO  T10--Pelvis    LUMBAR FUSION N/A 1/24/2023    Procedure: **AIRO** T8-T12 POSTERIOR FUSION, T8-T10 LAMINECTOMY, HARDWARE REMOVAL AND WASHOUT;  Surgeon: Guille Templeton MD;  [facility];  Service: Neurosurgery;  Laterality: N/A;    REMOVAL OF HARDWARE FROM SPINE N/A 2/21/2022    Procedure: REMOVAL, HARDWARE, SPINE;  Surgeon: Guille Templeton MD;  [facility];  Service: Neurosurgery;  Laterality: N/A;  Washout    SPINE SURGERY      THORACIC LAMINECTOMY WITH FUSION N/A 2/2/2023    Procedure: LAMINECTOMY, SPINE, THORACIC, WITH FUSION (T4-Pelvis fusion w/ T9-10 corpectomies) **AIRO;  Surgeon: Guille Templeton MD;  [facility];  Service: Neurosurgery;  Laterality: N/A;  AIRO, 2 bovies, aquamantys, Globus, Depuy, antibiotic beads, TXA, Peroxide; [employer] plastics closure       Time Tracking:     PT Received On: 02/05/23  PT Start Time: 1345     PT Stop Time: 1414  PT Total Time (min): 29 min     Billable Minutes: Re-eval 6, Therapeutic Activity 15, and Therapeutic Exercise 8      02/05/2023

## 2023-02-05 NOTE — PLAN OF CARE
[facility] Care Plan    POC reviewed with Rachel Zazueta and family at 1400. Pt verbalized understanding. Questions and concerns addressed. No acute events today. Pt progressing toward goals. Will continue to monitor. See below and flowsheets for full assessment and VS info.     - Tristen gtt continued  - MAP goals discontinued at 1900.   - Linens changed.   - Enema given.   - Phos replaced.   - Oxy given x 1.         Is this a stroke patient? no    Neuro:  Jane Coma Scale  Best Eye Response: 4-->(E4) spontaneous  Best Motor Response: 6-->(M6) obeys commands  Best Verbal Response: 5-->(V5) oriented  Jane Coma Scale Score: 15  Assessment Qualifiers: patient not sedated/intubated, no eye obstruction present  Pupil PERRLA: yes     24 hr Temp:  [97.9 °F (36.6 °C)-98.9 °F (37.2 °C)]     CV:   Rhythm: normal sinus rhythm  BP goals:   SBP < 180  MAP > 85    Resp:      Oxygen Concentration (%): 24    Plan: N/A    GI/:     Diet/Nutrition Received: regular  Last Bowel Movement: 02/02/23  Voiding Characteristics: ureteral catheter    Intake/Output Summary (Last 24 hours) at 2/5/2023 1742  Last data filed at 2/5/2023 1701  Gross per 24 hour   Intake 1967.91 ml   Output 806 ml   Net 1161.91 ml     Unmeasured Output  Urine Occurrence: 1  Stool Occurrence: 0  Pad Count: 1    Labs/Accuchecks:  Recent Labs   Lab 02/05/23  0049   WBC 11.22   RBC 2.62*   HGB 7.5*   HCT 24.5*         Recent Labs   Lab 02/05/23  0049      K 4.0   CO2 26      BUN 24*   CREATININE 0.4*   ALKPHOS 122   ALT 33   AST 46*   BILITOT 2.1*      Recent Labs   Lab 02/01/23  1113   INR 1.2    No results for input(s): CPK, CPKMB, TROPONINI, MB in the last 168 hours.    Electrolytes: No replacement orders  Accuchecks: none    Gtts:   hydromorphone in 0.9 % NaCl 6 mg/30 ml      phenylephrine 2.5 mcg/kg/min (02/05/23 1701)       LDA/Wounds:  Lines/Drains/Airways       Peripherally Inserted Central Catheter Line  Duration             PICC Double  Lumen 01/26/23 1209 left basilic 10 days              Drain  Duration                  Closed/Suction Drain 02/02/23 1401 Right Back Bulb 15 Fr. 3 days         Closed/Suction Drain 02/02/23 1402 Left Back Bulb 15 Fr. 3 days         Closed/Suction Drain 02/02/23 1402 Left Back Bulb 15 Fr. 3 days         Closed/Suction Drain 02/02/23 1402 Right Back Bulb 15 Fr. 3 days    Female External Urinary Catheter 02/05/23 0325 <1 day              Peripheral Intravenous Line  Duration                  Midline Catheter Insertion/Assessment  - Single Lumen 01/12/23 2138 Right basilic vein (medial side of arm) 18g x 10cm 23 days         Peripheral IV - Single Lumen 02/02/23 0900 20 G Left Hand 3 days                  Wounds: Yes  Wound care consulted: Yes

## 2023-02-05 NOTE — PLAN OF CARE
[facility] Care Plan    POC reviewed with Rachel Zazueta and family at 0300. Pt verbalized understanding. Questions and concerns addressed. No acute events overnight. Pt progressing toward goals. Will continue to monitor. See below and flowsheets for full assessment and VS info.   - neuro exam unchanged overnight  - BP WDL, see MAR for Tristen titrations throughout night  - Pt voiding spontaneously post gu removal   - pt states current pain treatment is effective, PRN oxycodone and valium used overnight  - MIHIR drains X4 remain in place. See flow sheets for output        Is this a stroke patient? no    Neuro:  Jane Coma Scale  Best Eye Response: 4-->(E4) spontaneous  Best Motor Response: 6-->(M6) obeys commands  Best Verbal Response: 5-->(V5) oriented  Jane Coma Scale Score: 15  Assessment Qualifiers: patient not sedated/intubated, no eye obstruction present  Pupil PERRLA: yes     24hr Temp:  [97.9 °F (36.6 °C)-99 °F (37.2 °C)]     CV:   Rhythm: normal sinus rhythm  BP goals:   SBP < 160  MAP > 85    Resp:      Oxygen Concentration (%): 24    Plan: N/A    GI/:     Diet/Nutrition Received: regular  Last Bowel Movement: 02/02/23  Voiding Characteristics: external catheter    Intake/Output Summary (Last 24 hours) at 2/5/2023 0428  Last data filed at 2/5/2023 0305  Gross per 24 hour   Intake 4457.15 ml   Output 2250 ml   Net 2207.15 ml     Unmeasured Output  Urine Occurrence: 1  Stool Occurrence: 0  Pad Count: 1    Labs/Accuchecks:  Recent Labs   Lab 02/05/23  0049   WBC 11.22   RBC 2.62*   HGB 7.5*   HCT 24.5*         Recent Labs   Lab 02/05/23  0049      K 4.0   CO2 26      BUN 24*   CREATININE 0.4*   ALKPHOS 122   ALT 33   AST 46*   BILITOT 2.1*      Recent Labs   Lab 02/01/23  1113   INR 1.2    No results for input(s): CPK, CPKMB, TROPONINI, MB in the last 168 hours.    Electrolytes: N/A - electrolytes WDL  Accuchecks: none    Gtts:   hydromorphone in 0.9 % NaCl 6 mg/30 ml      phenylephrine  2.5 mcg/kg/min (02/05/23 0305)       LDA/Wounds:  Lines/Drains/Airways       Peripherally Inserted Central Catheter Line  Duration             PICC Double Lumen 01/26/23 1209 left basilic 9 days              Drain  Duration                  Closed/Suction Drain 01/24/23 Posterior Back Accordion 10 Fr. 12 days         Closed/Suction Drain 01/24/23 Posterior;Right Back Accordion 10 Fr. 12 days         Closed/Suction Drain 02/02/23 1401 Right Back Bulb 15 Fr. 2 days         Closed/Suction Drain 02/02/23 1402 Left Back Bulb 15 Fr. 2 days         Closed/Suction Drain 02/02/23 1402 Left Back Bulb 15 Fr. 2 days         Closed/Suction Drain 02/02/23 1402 Right Back Bulb 15 Fr. 2 days    Female External Urinary Catheter 02/05/23 0325 <1 day              Peripheral Intravenous Line  Duration                  Midline Catheter Insertion/Assessment  - Single Lumen 01/12/23 2138 Right basilic vein (medial side of arm) 18g x 10cm 23 days         Peripheral IV - Single Lumen 02/02/23 0900 20 G Left Hand 2 days                  Wounds: Yes  Wound care consulted: No   Problem: Adjustment to Illness (Sepsis/Septic Shock)  Goal: Optimal Coping  Outcome: Ongoing, Progressing  Intervention: Optimize Psychosocial Adjustment to Illness  Flowsheets (Taken 2/5/2023 0427)  Supportive Measures: active listening utilized  Family/Support System Care: involvement promoted     Problem: Infection Progression (Sepsis/Septic Shock)  Goal: Absence of Infection Signs and Symptoms  Outcome: Ongoing, Progressing  Intervention: Initiate Sepsis Management  Flowsheets (Taken 2/5/2023 0427)  Infection Prevention:   cohorting utilized   hand hygiene promoted   rest/sleep promoted   single patient room provided  Infection Management: aseptic technique maintained  Stabilization Measures: airway opened  Isolation Precautions: precautions maintained  Intervention: Promote Stabilization  Flowsheets (Taken 2/5/2023 0427)  Fever Reduction/Comfort Measures:    lightweight clothing   lightweight bedding  Fluid/Electrolyte Management:   fluids adjusted   fluids provided  Lung Protection Measures: fluid excess minimized  Intervention: Promote Recovery  Flowsheets (Taken 2/5/2023 4597)  Sleep/Rest Enhancement: awakenings minimized  Airway/Ventilation Support: pulmonary hygiene promoted  Activity Management:   Arm raise - L1   Ankle pumps - L1

## 2023-02-05 NOTE — PT/OT/SLP RE-EVAL
Occupational Therapy   [id number]     Name: Rachel Zazueta  MRN: [id number]  Admitting Diagnosis:  Weakness of both lower extremities  Recent Surgery: Procedure(s) (LRB):  LAMINECTOMY, SPINE, THORACIC, WITH FUSION (T4-Pelvis fusion w/ T9-10 corpectomies) **AIRO (N/A) 3 Days Post-Op    Recommendations:     Discharge Recommendations: nursing facility, skilled  Discharge Equipment Recommendations: hospital bed, lift device  Barriers to discharge:  Inaccessible home environment, Decreased caregiver support    Assessment:     Rachel Zazueta is a 42 y.o. female with a medical diagnosis of Weakness of both lower extremities.  She presents with the following performance deficits affecting function are weakness, impaired endurance, impaired self care skills, impaired functional mobility, impaired balance, decreased upper extremity function, decreased lower extremity function, decreased safety awareness, pain, decreased ROM, impaired skin, edema, impaired cardiopulmonary response to activity, orthopedic precautions. Pt presents with motivation for therapy, however limited by pain and edema throughout abdomen for sitting EOB. As a result, she demonstrated a posterior lean causing her hips to come forward and pt was returned to supine for safety. Pt required increased (A) for functional mobility due to the aforementioned deficits and is a high fall risk at this time. Pt would benefit from continued skilled acute OT services in order to maximize (I) and with ADLs and functional mobility to ensure safe return to PLOF in the least restrictive environment. OT recommending SNF once pt is medically appropriate for d/c.     Rehab Prognosis:  Good; patient would benefit from acute skilled OT services to address these deficits and reach maximum level of function.       Plan:     Patient to be seen 3 x/week to address the above listed problems via self-care/home management, therapeutic activities, therapeutic exercises,  "neuromuscular re-education  Plan of Care Expires: 02/20/23  Plan of Care Reviewed with: patient    Subjective   "Thank y'all for coming"     Chief Complaint: pain in back  Patient/Family stated goals: Return to PLOF  Communicated with: RN prior to session.  Pain/Comfort:  Pain Rating 1: 8/10  Location - Side 1: Bilateral  Location - Orientation 1: generalized  Location 1: back  Pain Addressed 1: Pre-medicate for activity, Reposition, Distraction, Cessation of Activity, Nurse notified  Pain Rating Post-Intervention 1: 8/10    Objective:     Communicated with: RN prior to session.  Patient found HOB elevated with: PureWick, telemetry, peripheral IV, MIHIR drain, PCA, blood pressure cuff, pulse ox (continuous), wound vac upon OT entry to room.    General Precautions: Standard, fall, contact  Orthopedic Precautions: spinal precautions  Braces: TLSO  Respiratory Status: High flow    Occupational Performance:    Bed Mobility:    Patient completed Rolling/Turning to Left with  total assistance and 2 persons  Patient completed Rolling/Turning to Right with total assistance and 2 persons  Patient completed Supine to Sit with total assistance and 2-3 persons  Patient completed Sit to Supine with total assistance and 2 persons    Functional Mobility/Transfers:  Functional Mobility: OOB mobility deferred.     Activities of Daily Living:  Lower Body Dressing: total assistance to don socks while supine in bed     Cognitive/Visual Perceptual:  Cognitive/Psychosocial Skills:     -       Oriented to: Person, Place, Time, and Situation   -       Follows Commands/attention:Follows one-step commands  -       Communication: clear/fluent  -       Safety awareness/insight to disability: impaired   -       Mood/Affect/Coping skills/emotional control: Labile    Physical Exam:  Balance:    -       Poor  Postural examination/scapula alignment:    -       Pt unable to flex trunk into 90 degrees for proper sitting posture due to bloating and edema " in stomach area.  Skin integrity: weaping edema in BLE  Edema:  Pitting   Upper Extremity Range of Motion:   Evidenced by pt's functional bed mobility   -       Right Upper Extremity: Grossly, WFL   -       Left Upper Extremity: Grossly, WFL  Upper Extremity Strength:    -       Right Upper Extremity: Grossly, WFL  -       Left Upper Extremity: Grossly, WFL    Strength:    -       Right Upper Extremity: Grossly WFL  -       Left Upper Extremity: Grossly WFL    AMPAC 6 Click:  AMPAC Total Score: 8    Treatment & Education:  Pt sat EOB ~5 min with maximal cueing for trunk flexion and postural control. Pt demonstrated a posterior lean, causing hips to shift forward. Pt returned to supine for safety.   Pt repositioned in bed for facilitating pressure relief for skin breakdown prevention. Pt participated in bilateral log rolling for improved (I) with bed mobility.   Educated on spinal precautions and log rolling technique. Pt able to recite 3/3 with (I).   Pt educated on:   Role of OT, POC, and d/c planning.   Importance of OOB activities to increase endurance and tolerance for increased participation in daily ADLs.       Pt verbalized understanding and all questions were addressed within the scope of OT.       Patient left HOB elevated with all lines intact, call button in reach, bed alarm on, and RN present    GOALS:   Multidisciplinary Problems       Occupational Therapy Goals          Problem: Occupational Therapy    Goal Priority Disciplines Outcome Interventions   Occupational Therapy Goal     OT, PT/OT Ongoing, Progressing    Description: Goals to be met by: 2/10     Patient will increase functional independence with ADLs by performing:    UE Dressing with Moderate Assistance.  Grooming while seated with Minimal Assistance.  Sitting at edge of bed x15 minutes with Minimal Assistance.  Rolling to Bilateral with Minimal Assistance.   Supine to sit with Moderate Assistance.                         History:      Past Medical History:   Diagnosis Date    Anemia     Anxiety     Depressed     Diskitis     Hep C w/o coma, chronic     IV drug abuse     Kidney stone     Liver cirrhosis          Past Surgical History:   Procedure Laterality Date    ARTHROTOMY OF SHOULDER Left 11/15/2022    Procedure: ARTHROTOMY, SHOULDER;  Surgeon: Bjorn Hayes MD;  Location: [facility];  Service: Orthopedics;  Laterality: Left;  Left acromioclavicular joint arthrotomy    BACK SURGERY      benine tumor removal      forehead, age 9    CHOLECYSTECTOMY      KIDNEY SURGERY      LUMBAR FUSION Bilateral 3/2/2022    Procedure: FUSION, SPINE, LUMBAR;  Surgeon: Guille Templeton MD;  Location: [address];  Service: Neurosurgery;  Laterality: Bilateral;  AIRO  T10--Pelvis    LUMBAR FUSION N/A 1/24/2023    Procedure: **AIRO** T8-T12 POSTERIOR FUSION, T8-T10 LAMINECTOMY, HARDWARE REMOVAL AND WASHOUT;  Surgeon: Guille Templeton MD;  Location: [address];  Service: Neurosurgery;  Laterality: N/A;    REMOVAL OF HARDWARE FROM SPINE N/A 2/21/2022    Procedure: REMOVAL, HARDWARE, SPINE;  Surgeon: Guille Templeton MD;  Location: [address];  Service: Neurosurgery;  Laterality: N/A;  [place]    SPINE SURGERY      THORACIC LAMINECTOMY WITH FUSION N/A 2/2/2023    Procedure: LAMINECTOMY, SPINE, THORACIC, WITH FUSION (T4-Pelvis fusion w/ T9-10 corpectomies) **AIRO;  Surgeon: Guille Templeton MD;  Location: [address];  Service: Neurosurgery;  Laterality: N/A;  AIRO, 2 bovies, aquamantys, Globus, Depuy, antibiotic beads, TXA, Peroxide; [employer] plastics closure       Time Tracking:     OT Date of Treatment: 02/05/23  OT Start Time: 1345  OT Stop Time: 1414  OT Total Time (min): 29 min    Billable Minutes:Re-eval 6  Therapeutic Activity 8  Therapeutic Exercise 15    2/5/2023   Co-evaluation/treatment performed due to patient's multiple deficits requiring two skilled therapists to appropriately and safely assess patient's strength, endurance, functional  mobility, and ADL performance while facilitating functional tasks in addition to accommodating for patient's activity tolerance and medical acuity.

## 2023-02-05 NOTE — ASSESSMENT & PLAN NOTE
43 yo F w/ history of multiple spinal surgeries presented to OSH with possible infection of patient's shoulder and found to have UTI and E. Coli bacteremia and progressively worse B/L LE weakness. Transferred to [facility] for neurosurgical evaluation. Underwent Laminectomy T8-T10; Posterior spinal fusion T8-T12; hardware removal and washout. Admitted to [facility] for HLOC. Arrived to unit on PCA dilaudid pump. Patient is HDS on 3L NC.   - Admit to [facility]  - NSGY, ID following  - q2hr neuro checks, vitals, I/Os  - HV drains x 2 in place, management per NSGY  -Transfuse for Hg<7  - Cultures positive for ESBL, blood cultures with NGTD. WCTM  - Antibiotics per ID recs  - CMP, Mag, Phos, CBC daily  - MAP goals > 65, SBP < 180  - PRN labetalol, hydralazine  - MM pain regimen; PCA Dilaudid pump for pain management  - Patient will need to lie flat with HOB 15-20°   - Aggressive bowel regimen  - PT/OT as appropriate   - VTE prophylaxis: mechanical, hold chemical given pancytopenia  - post operative management - POD#1  - f/u with nsgy regarding post-operative positional and mobility restrictions  2/5/2023: MAP >85, suri gtt available   Last day of MAPs goal  Plan to stepdown to NSGY tomorrow

## 2023-02-05 NOTE — PROGRESS NOTES
Plastic and Reconstructive Surgery   Progress Note    Subjective:  Patient seen and examined at bedside. Patient started on phenylephrine. Pain controlled. Drains still putting out a lot of serosanguinous fluid.    Objective:  Vital signs in last 24 hours:  Temp:  [97.9 °F (36.6 °C)-99 °F (37.2 °C)] 98.5 °F (36.9 °C)  Pulse:  [] 97  Resp:  [10-24] 21  SpO2:  [94 %-98 %] 97 %  BP: (104-139)/(54-78) 106/57    Intake/Output last 3 shifts:  I/O last 3 completed shifts:  In: 4660.7 [P.O.:480; I.V.:2309.9; IV Piggyback:1870.8]  Out: 2356 [Urine:1150; Drains:1206]    Intake/Output this shift:  I/O this shift:  In: 137.3 [I.V.:137.3]  Out: -         Physical Exam:  VITAL SIGNS:   Vitals:    23 0801 23 0807 23 0830 23 0901   BP: 104/78 (!) 104/58  (!) 106/57   BP Location:       Patient Position:       Pulse: 93 90  97   Resp: 14 12 (!) 23 (!) 21   Temp:       TempSrc:       SpO2: 96% 96%  97%   Weight:       Height:         TMAX: Temp (24hrs), Av.5 °F (36.9 °C), Min:97.9 °F (36.6 °C), Max:99 °F (37.2 °C)    General: Alert; No acute distress  Cardiovascular: Regular rate   Respiratory: Normal respiratory effort. Chest rise symmetric.   Abdomen: Soft, nontender, nondistended  Extremity: Moves all extremities equally.  Neurologic: No focal deficit. Speech normal  Back: preveena dressing in place with 2 drains bilaterally, serosanguinous output.      Scheduled Medications acetaminophen, 1,000 mg, TID  buPROPion, 300 mg, Daily  gabapentin, 600 mg, TID  heparin (porcine), 5,000 Units, Q8H  lactulose, 20 g, TID  meropenem (MERREM) IVPB, 2 g, Q8H  methocarbamoL, 1,000 mg, QID  pantoprazole, 40 mg, Daily  polyethylene glycol, 17 g, BID  senna-docusate 8.6-50 mg, 2 tablet, BID  sodium chloride 0.9%, 10 mL, Q6H      PRN Medications sodium chloride, sodium chloride, sodium chloride, sodium chloride, sodium chloride, sodium chloride, sodium chloride, dextrose 10%, dextrose 10%, diazePAM, glucagon (human  recombinant), glucose, glucose, hydrALAZINE, labetalol, magnesium hydroxide 400 mg/5 ml, magnesium oxide, magnesium oxide, melatonin, naloxone, ondansetron, oxyCODONE, polyethylene glycol, potassium bicarbonate, potassium bicarbonate, potassium bicarbonate, potassium, sodium phosphates, potassium, sodium phosphates, potassium, sodium phosphates, prochlorperazine, sodium chloride 0.9%, Flushing PICC Protocol **AND** sodium chloride 0.9% **AND** sodium chloride 0.9%    Recent Labs:   Lab Results   Component Value Date    WBC 11.22 02/05/2023    HGB 7.5 (L) 02/05/2023    HCT 24.5 (L) 02/05/2023    MCV 94 02/05/2023     02/05/2023     Lab Results   Component Value Date     (H) 02/05/2023     02/05/2023    K 4.0 02/05/2023     02/05/2023    BUN 24 (H) 02/05/2023         Assessment: 42 y.o. y/o female 3 Days Post-Op s/p Procedure(s):  LAMINECTOMY, SPINE, THORACIC, WITH FUSION (T4-Pelvis fusion w/ T9-10 corpectomies) **AIRO Doing well postoperatively.  Wound vac without leak  Drains with SSO     Plan  -Continue to monitor drain output  - Continue prevena  - f/u Neurosurgery recs  -Rest of care per primary      Efrem Sutton DO

## 2023-02-06 PROBLEM — D62 ACUTE BLOOD LOSS ANEMIA: Status: ACTIVE | Noted: 2023-02-06

## 2023-02-06 LAB
ABO + RH BLD: NORMAL
ALBUMIN SERPL BCP-MCNC: 1.9 G/DL (ref 3.5–5.2)
ALP SERPL-CCNC: 127 U/L (ref 55–135)
ALT SERPL W/O P-5'-P-CCNC: 34 U/L (ref 10–44)
ANION GAP SERPL CALC-SCNC: 6 MMOL/L (ref 8–16)
APTT BLDCRRT: 36.6 SEC (ref 21–32)
AST SERPL-CCNC: 45 U/L (ref 10–40)
BASOPHILS # BLD AUTO: 0.03 K/UL (ref 0–0.2)
BASOPHILS # BLD AUTO: 0.04 K/UL (ref 0–0.2)
BASOPHILS # BLD AUTO: 0.05 K/UL (ref 0–0.2)
BASOPHILS NFR BLD: 0.6 % (ref 0–1.9)
BASOPHILS NFR BLD: 0.7 % (ref 0–1.9)
BASOPHILS NFR BLD: 0.8 % (ref 0–1.9)
BILIRUB SERPL-MCNC: 2.3 MG/DL (ref 0.1–1)
BLD GP AB SCN CELLS X3 SERPL QL: NORMAL
BLD PROD TYP BPU: NORMAL
BLD PROD TYP BPU: NORMAL
BLOOD UNIT EXPIRATION DATE: NORMAL
BLOOD UNIT EXPIRATION DATE: NORMAL
BLOOD UNIT TYPE CODE: 6200
BLOOD UNIT TYPE CODE: 6200
BLOOD UNIT TYPE: NORMAL
BLOOD UNIT TYPE: NORMAL
BUN SERPL-MCNC: 19 MG/DL (ref 6–20)
CALCIUM SERPL-MCNC: 7.6 MG/DL (ref 8.7–10.5)
CHLORIDE SERPL-SCNC: 104 MMOL/L (ref 95–110)
CO2 SERPL-SCNC: 24 MMOL/L (ref 23–29)
CODING SYSTEM: NORMAL
CODING SYSTEM: NORMAL
CREAT SERPL-MCNC: 0.4 MG/DL (ref 0.5–1.4)
CROSSMATCH INTERPRETATION: NORMAL
CROSSMATCH INTERPRETATION: NORMAL
DIFFERENTIAL METHOD: ABNORMAL
DISPENSE STATUS: NORMAL
DISPENSE STATUS: NORMAL
EOSINOPHIL # BLD AUTO: 0.3 K/UL (ref 0–0.5)
EOSINOPHIL NFR BLD: 4.6 % (ref 0–8)
EOSINOPHIL NFR BLD: 5.3 % (ref 0–8)
EOSINOPHIL NFR BLD: 5.5 % (ref 0–8)
ERYTHROCYTE [DISTWIDTH] IN BLOOD BY AUTOMATED COUNT: 20.4 % (ref 11.5–14.5)
ERYTHROCYTE [DISTWIDTH] IN BLOOD BY AUTOMATED COUNT: 21.1 % (ref 11.5–14.5)
ERYTHROCYTE [DISTWIDTH] IN BLOOD BY AUTOMATED COUNT: 21.3 % (ref 11.5–14.5)
EST. GFR  (NO RACE VARIABLE): >60 ML/MIN/1.73 M^2
FIBRINOGEN PPP-MCNC: 159 MG/DL (ref 182–400)
FIBRINOGEN PPP-MCNC: 165 MG/DL (ref 182–400)
GLUCOSE SERPL-MCNC: 123 MG/DL (ref 70–110)
HCT VFR BLD AUTO: 21.4 % (ref 37–48.5)
HCT VFR BLD AUTO: 23.5 % (ref 37–48.5)
HCT VFR BLD AUTO: 25 % (ref 37–48.5)
HGB BLD-MCNC: 6.4 G/DL (ref 12–16)
HGB BLD-MCNC: 7.1 G/DL (ref 12–16)
HGB BLD-MCNC: 7.7 G/DL (ref 12–16)
IMM GRANULOCYTES # BLD AUTO: 0.02 K/UL (ref 0–0.04)
IMM GRANULOCYTES # BLD AUTO: 0.03 K/UL (ref 0–0.04)
IMM GRANULOCYTES # BLD AUTO: 0.04 K/UL (ref 0–0.04)
IMM GRANULOCYTES NFR BLD AUTO: 0.4 % (ref 0–0.5)
IMM GRANULOCYTES NFR BLD AUTO: 0.5 % (ref 0–0.5)
IMM GRANULOCYTES NFR BLD AUTO: 0.7 % (ref 0–0.5)
INR PPP: 1.3 (ref 0.8–1.2)
LYMPHOCYTES # BLD AUTO: 0.6 K/UL (ref 1–4.8)
LYMPHOCYTES # BLD AUTO: 0.7 K/UL (ref 1–4.8)
LYMPHOCYTES # BLD AUTO: 0.8 K/UL (ref 1–4.8)
LYMPHOCYTES NFR BLD: 10.7 % (ref 18–48)
LYMPHOCYTES NFR BLD: 13.2 % (ref 18–48)
LYMPHOCYTES NFR BLD: 13.7 % (ref 18–48)
MAGNESIUM SERPL-MCNC: 1.9 MG/DL (ref 1.6–2.6)
MCH RBC QN AUTO: 28.7 PG (ref 27–31)
MCH RBC QN AUTO: 28.8 PG (ref 27–31)
MCH RBC QN AUTO: 28.9 PG (ref 27–31)
MCHC RBC AUTO-ENTMCNC: 29.9 G/DL (ref 32–36)
MCHC RBC AUTO-ENTMCNC: 30.2 G/DL (ref 32–36)
MCHC RBC AUTO-ENTMCNC: 30.8 G/DL (ref 32–36)
MCV RBC AUTO: 94 FL (ref 82–98)
MCV RBC AUTO: 95 FL (ref 82–98)
MCV RBC AUTO: 96 FL (ref 82–98)
MONOCYTES # BLD AUTO: 0.6 K/UL (ref 0.3–1)
MONOCYTES # BLD AUTO: 0.7 K/UL (ref 0.3–1)
MONOCYTES # BLD AUTO: 0.7 K/UL (ref 0.3–1)
MONOCYTES NFR BLD: 11.1 % (ref 4–15)
MONOCYTES NFR BLD: 11.6 % (ref 4–15)
MONOCYTES NFR BLD: 12.7 % (ref 4–15)
NEUTROPHILS # BLD AUTO: 3.2 K/UL (ref 1.8–7.7)
NEUTROPHILS # BLD AUTO: 4.1 K/UL (ref 1.8–7.7)
NEUTROPHILS # BLD AUTO: 4.4 K/UL (ref 1.8–7.7)
NEUTROPHILS NFR BLD: 67.6 % (ref 38–73)
NEUTROPHILS NFR BLD: 67.9 % (ref 38–73)
NEUTROPHILS NFR BLD: 72.4 % (ref 38–73)
NRBC BLD-RTO: 0 /100 WBC
NUM UNITS TRANS PACKED RBC: NORMAL
PHOSPHATE SERPL-MCNC: 2.4 MG/DL (ref 2.7–4.5)
PLATELET # BLD AUTO: 116 K/UL (ref 150–450)
PLATELET # BLD AUTO: 123 K/UL (ref 150–450)
PLATELET # BLD AUTO: 93 K/UL (ref 150–450)
PMV BLD AUTO: 9.4 FL (ref 9.2–12.9)
PMV BLD AUTO: 9.5 FL (ref 9.2–12.9)
PMV BLD AUTO: 9.8 FL (ref 9.2–12.9)
POTASSIUM SERPL-SCNC: 4.2 MMOL/L (ref 3.5–5.1)
PROT SERPL-MCNC: 4.7 G/DL (ref 6–8.4)
PROTHROMBIN TIME: 13.7 SEC (ref 9–12.5)
RBC # BLD AUTO: 2.22 M/UL (ref 4–5.4)
RBC # BLD AUTO: 2.47 M/UL (ref 4–5.4)
RBC # BLD AUTO: 2.66 M/UL (ref 4–5.4)
SODIUM SERPL-SCNC: 134 MMOL/L (ref 136–145)
UNIT NUMBER: NORMAL
WBC # BLD AUTO: 4.71 K/UL (ref 3.9–12.7)
WBC # BLD AUTO: 6.05 K/UL (ref 3.9–12.7)
WBC # BLD AUTO: 6.06 K/UL (ref 3.9–12.7)

## 2023-02-06 PROCEDURE — 36430 TRANSFUSION BLD/BLD COMPNT: CPT

## 2023-02-06 PROCEDURE — 25000003 PHARM REV CODE 250

## 2023-02-06 PROCEDURE — 99291 CRITICAL CARE FIRST HOUR: CPT | Mod: ,,, | Performed by: PSYCHIATRY & NEUROLOGY

## 2023-02-06 PROCEDURE — 94761 N-INVAS EAR/PLS OXIMETRY MLT: CPT

## 2023-02-06 PROCEDURE — 99900035 HC TECH TIME PER 15 MIN (STAT)

## 2023-02-06 PROCEDURE — 93005 ELECTROCARDIOGRAM TRACING: CPT

## 2023-02-06 PROCEDURE — 25000003 PHARM REV CODE 250: Performed by: PSYCHIATRY & NEUROLOGY

## 2023-02-06 PROCEDURE — 93010 ELECTROCARDIOGRAM REPORT: CPT | Mod: ,,, | Performed by: INTERNAL MEDICINE

## 2023-02-06 PROCEDURE — 27000207 HC ISOLATION

## 2023-02-06 PROCEDURE — 85025 COMPLETE CBC W/AUTO DIFF WBC: CPT

## 2023-02-06 PROCEDURE — 86920 COMPATIBILITY TEST SPIN: CPT | Performed by: STUDENT IN AN ORGANIZED HEALTH CARE EDUCATION/TRAINING PROGRAM

## 2023-02-06 PROCEDURE — 80053 COMPREHEN METABOLIC PANEL: CPT | Performed by: STUDENT IN AN ORGANIZED HEALTH CARE EDUCATION/TRAINING PROGRAM

## 2023-02-06 PROCEDURE — 25000003 PHARM REV CODE 250: Performed by: STUDENT IN AN ORGANIZED HEALTH CARE EDUCATION/TRAINING PROGRAM

## 2023-02-06 PROCEDURE — 63600175 PHARM REV CODE 636 W HCPCS: Performed by: NURSE PRACTITIONER

## 2023-02-06 PROCEDURE — 85730 THROMBOPLASTIN TIME PARTIAL: CPT | Performed by: STUDENT IN AN ORGANIZED HEALTH CARE EDUCATION/TRAINING PROGRAM

## 2023-02-06 PROCEDURE — 63600175 PHARM REV CODE 636 W HCPCS: Performed by: STUDENT IN AN ORGANIZED HEALTH CARE EDUCATION/TRAINING PROGRAM

## 2023-02-06 PROCEDURE — 63600175 PHARM REV CODE 636 W HCPCS

## 2023-02-06 PROCEDURE — 83735 ASSAY OF MAGNESIUM: CPT | Performed by: STUDENT IN AN ORGANIZED HEALTH CARE EDUCATION/TRAINING PROGRAM

## 2023-02-06 PROCEDURE — 20000000 HC ICU ROOM

## 2023-02-06 PROCEDURE — 85025 COMPLETE CBC W/AUTO DIFF WBC: CPT | Mod: 91 | Performed by: STUDENT IN AN ORGANIZED HEALTH CARE EDUCATION/TRAINING PROGRAM

## 2023-02-06 PROCEDURE — 86900 BLOOD TYPING SEROLOGIC ABO: CPT | Performed by: PSYCHIATRY & NEUROLOGY

## 2023-02-06 PROCEDURE — 99291 PR CRITICAL CARE, E/M 30-74 MINUTES: ICD-10-PCS | Mod: ,,, | Performed by: PSYCHIATRY & NEUROLOGY

## 2023-02-06 PROCEDURE — 93010 EKG 12-LEAD: ICD-10-PCS | Mod: ,,, | Performed by: INTERNAL MEDICINE

## 2023-02-06 PROCEDURE — P9016 RBC LEUKOCYTES REDUCED: HCPCS | Performed by: STUDENT IN AN ORGANIZED HEALTH CARE EDUCATION/TRAINING PROGRAM

## 2023-02-06 PROCEDURE — 85384 FIBRINOGEN ACTIVITY: CPT | Mod: 91 | Performed by: STUDENT IN AN ORGANIZED HEALTH CARE EDUCATION/TRAINING PROGRAM

## 2023-02-06 PROCEDURE — 85384 FIBRINOGEN ACTIVITY: CPT | Performed by: PSYCHIATRY & NEUROLOGY

## 2023-02-06 PROCEDURE — 25000003 PHARM REV CODE 250: Performed by: INTERNAL MEDICINE

## 2023-02-06 PROCEDURE — A4216 STERILE WATER/SALINE, 10 ML: HCPCS | Performed by: PSYCHIATRY & NEUROLOGY

## 2023-02-06 PROCEDURE — 84100 ASSAY OF PHOSPHORUS: CPT | Performed by: STUDENT IN AN ORGANIZED HEALTH CARE EDUCATION/TRAINING PROGRAM

## 2023-02-06 PROCEDURE — 85610 PROTHROMBIN TIME: CPT | Performed by: STUDENT IN AN ORGANIZED HEALTH CARE EDUCATION/TRAINING PROGRAM

## 2023-02-06 RX ORDER — AMOXICILLIN 250 MG
1 CAPSULE ORAL 2 TIMES DAILY
Status: DISCONTINUED | OUTPATIENT
Start: 2023-02-06 | End: 2023-02-07

## 2023-02-06 RX ORDER — LACTULOSE 10 G/15ML
20 SOLUTION ORAL 3 TIMES DAILY
Status: DISCONTINUED | OUTPATIENT
Start: 2023-02-06 | End: 2023-02-28 | Stop reason: HOSPADM

## 2023-02-06 RX ORDER — HYDROCODONE BITARTRATE AND ACETAMINOPHEN 500; 5 MG/1; MG/1
TABLET ORAL
Status: DISCONTINUED | OUTPATIENT
Start: 2023-02-06 | End: 2023-02-20

## 2023-02-06 RX ORDER — FUROSEMIDE 20 MG/1
20 TABLET ORAL 2 TIMES DAILY
Status: DISCONTINUED | OUTPATIENT
Start: 2023-02-06 | End: 2023-02-06

## 2023-02-06 RX ORDER — SPIRONOLACTONE 25 MG/1
25 TABLET ORAL DAILY
Status: DISCONTINUED | OUTPATIENT
Start: 2023-02-06 | End: 2023-02-15

## 2023-02-06 RX ORDER — BISACODYL 10 MG
10 SUPPOSITORY, RECTAL RECTAL EVERY OTHER DAY
Status: DISCONTINUED | OUTPATIENT
Start: 2023-02-08 | End: 2023-02-28 | Stop reason: HOSPADM

## 2023-02-06 RX ORDER — FUROSEMIDE 40 MG/1
40 TABLET ORAL 2 TIMES DAILY
Status: DISCONTINUED | OUTPATIENT
Start: 2023-02-07 | End: 2023-02-08

## 2023-02-06 RX ADMIN — METHOCARBAMOL 1000 MG: 500 TABLET ORAL at 01:02

## 2023-02-06 RX ADMIN — DIAZEPAM 5 MG: 5 TABLET ORAL at 08:02

## 2023-02-06 RX ADMIN — MEROPENEM 2 G: 1 INJECTION INTRAVENOUS at 09:02

## 2023-02-06 RX ADMIN — PANTOPRAZOLE SODIUM 40 MG: 40 TABLET, DELAYED RELEASE ORAL at 08:02

## 2023-02-06 RX ADMIN — DIAZEPAM 5 MG: 5 TABLET ORAL at 03:02

## 2023-02-06 RX ADMIN — GABAPENTIN 600 MG: 300 CAPSULE ORAL at 08:02

## 2023-02-06 RX ADMIN — Medication 10 ML: at 06:02

## 2023-02-06 RX ADMIN — LACTULOSE 20 G: 20 SOLUTION ORAL at 10:02

## 2023-02-06 RX ADMIN — MEROPENEM 2 G: 1 INJECTION INTRAVENOUS at 05:02

## 2023-02-06 RX ADMIN — METHOCARBAMOL 1000 MG: 500 TABLET ORAL at 08:02

## 2023-02-06 RX ADMIN — Medication 6 MG: at 10:02

## 2023-02-06 RX ADMIN — SENNOSIDES AND DOCUSATE SODIUM 2 TABLET: 50; 8.6 TABLET ORAL at 10:02

## 2023-02-06 RX ADMIN — METHOCARBAMOL 1000 MG: 500 TABLET ORAL at 05:02

## 2023-02-06 RX ADMIN — ACETAMINOPHEN 1000 MG: 500 TABLET ORAL at 10:02

## 2023-02-06 RX ADMIN — OXYCODONE HYDROCHLORIDE 10 MG: 10 TABLET ORAL at 05:02

## 2023-02-06 RX ADMIN — Medication: at 10:02

## 2023-02-06 RX ADMIN — SENNOSIDES AND DOCUSATE SODIUM 1 TABLET: 50; 8.6 TABLET ORAL at 10:02

## 2023-02-06 RX ADMIN — Medication 10 ML: at 12:02

## 2023-02-06 RX ADMIN — MEROPENEM 2 G: 1 INJECTION INTRAVENOUS at 01:02

## 2023-02-06 RX ADMIN — ACETAMINOPHEN 1000 MG: 500 TABLET ORAL at 08:02

## 2023-02-06 RX ADMIN — GABAPENTIN 600 MG: 300 CAPSULE ORAL at 03:02

## 2023-02-06 RX ADMIN — GABAPENTIN 600 MG: 300 CAPSULE ORAL at 10:02

## 2023-02-06 RX ADMIN — SPIRONOLACTONE 25 MG: 25 TABLET, FILM COATED ORAL at 03:02

## 2023-02-06 RX ADMIN — HEPARIN SODIUM 5000 UNITS: 5000 INJECTION INTRAVENOUS; SUBCUTANEOUS at 05:02

## 2023-02-06 RX ADMIN — LACTULOSE 20 G: 20 SOLUTION ORAL at 03:02

## 2023-02-06 RX ADMIN — METHOCARBAMOL 1000 MG: 500 TABLET ORAL at 10:02

## 2023-02-06 RX ADMIN — DIAZEPAM 5 MG: 5 TABLET ORAL at 10:02

## 2023-02-06 RX ADMIN — HEPARIN SODIUM 5000 UNITS: 5000 INJECTION INTRAVENOUS; SUBCUTANEOUS at 10:02

## 2023-02-06 RX ADMIN — ACETAMINOPHEN 1000 MG: 500 TABLET ORAL at 03:02

## 2023-02-06 RX ADMIN — BUPROPION HYDROCHLORIDE 300 MG: 300 TABLET, FILM COATED, EXTENDED RELEASE ORAL at 08:02

## 2023-02-06 RX ADMIN — FUROSEMIDE 20 MG: 20 TABLET ORAL at 05:02

## 2023-02-06 NOTE — ASSESSMENT & PLAN NOTE
42 year old woman s/p multiple spinal surgeries presented to OSH with possible infection of shoulder. Found to have UTI and E. Coli bacteremia and progressively worse BLE weakness. Transferred to [facility] for neurosurgical evaluation. Underwent Laminectomy T8-T10; Posterior spinal fusion T8-T12; hardware removal and washout on 1/24. Admitted to [facility] postop. On 2/2 underwent T4-pelvis fusion and T9-T10 corpectomies. To complete 8 week course of meropenem per ID for ESBL E coli from bone culture, end date 3/29.    - neuro checks q2hr daytime and q4hr evening, vitals q1hr, I/Os  - HV drains x 2 in place, management per NSGY  - meropenem for ESBL bacteremia per ID  - Transfuse for Hb <7  - CMP, Mag, Phos, CBC daily  - MAP goals > 65, SBP < 180  - PRN labetalol, hydralazine  - MM pain regimen: PCA Dilaudid pump, Tylenol, gabapentin, robaxin, PRN oxycodone  - Aggressive bowel regimen  - PT/OT as appropriate

## 2023-02-06 NOTE — ASSESSMENT & PLAN NOTE
Patient has reportedly refused transplant evaluation in the past.   - daily CMP and trend LFTs  - continue Lactulose 20 g TID  - Lasix 20 mg PO BID   - will need Hepatology follow up outpatient    MELD-Na score: 16 at 2/6/2023 10:47 AM  MELD score: 13 at 2/6/2023 10:47 AM  Calculated from:  Serum Creatinine: 0.4 mg/dL (Using min of 1 mg/dL) at 2/6/2023  1:57 AM  Serum Sodium: 134 mmol/L at 2/6/2023  1:57 AM  Total Bilirubin: 2.3 mg/dL at 2/6/2023  1:57 AM  INR(ratio): 1.3 at 2/6/2023 10:47 AM  Age: 42 years

## 2023-02-06 NOTE — PLAN OF CARE
[facility] Care Plan    POC reviewed with Rachel Marbin and family at 1300. Pt verbalized understanding. Questions and concerns addressed. No acute events today. Pt progressing toward goals. Will continue to monitor. See below and flowsheets for full assessment and VS info.   -Neuro exam stable throughout shift  -PT remained tachycardic w HR sustaining in 120s-130s ([facility] aware), EKG obtained, CBC + coags repeated with Hgb <7, 1 U PRBC given, subq heparin held per MD order.  -Pt remained off of pressors  -PRN valium given x2 for spasms  -PCA pump in use  -Full bath, shampoo, and linen change complete  -Pt had BM x2  -Jones placed for accurate I/Os  -See I/O flowsheet for MIHIR drain output  -WC @ bedside to assess for weeping, signed off d/t skin being dry and intact            Is this a stroke patient? no    Neuro:  Jane Coma Scale  Best Eye Response: 4-->(E4) spontaneous  Best Motor Response: 6-->(M6) obeys commands  Best Verbal Response: 5-->(V5) oriented  Jane Coma Scale Score: 15  Assessment Qualifiers: patient not sedated/intubated  Pupil PERRLA: yes     24 hr Temp:  [98.3 °F (36.8 °C)-99 °F (37.2 °C)]     CV:   Rhythm: sinus tachycardia  BP goals:   SBP < 160  MAP > 65    Resp:      Oxygen Concentration (%): 24    Plan: N/A    GI/:     Diet/Nutrition Received: regular  Last Bowel Movement: 02/06/23  Voiding Characteristics: external catheter    Intake/Output Summary (Last 24 hours) at 2/6/2023 1324  Last data filed at 2/6/2023 1100  Gross per 24 hour   Intake 694.13 ml   Output 1655 ml   Net -960.87 ml     Unmeasured Output  Urine Occurrence: 1  Stool Occurrence: 1  Pad Count: 2    Labs/Accuchecks:  Recent Labs   Lab 02/06/23  1047   WBC 4.71   RBC 2.22*   HGB 6.4*   HCT 21.4*   PLT 93*      Recent Labs   Lab 02/06/23  0157   *   K 4.2   CO2 24      BUN 19   CREATININE 0.4*   ALKPHOS 127   ALT 34   AST 45*   BILITOT 2.3*      Recent Labs   Lab 02/06/23  1047   INR 1.3*   APTT 36.6*    No  results for input(s): CPK, CPKMB, TROPONINI, MB in the last 168 hours.    Electrolytes: N/A - electrolytes WDL  Accuchecks: none    Gtts:   hydromorphone in 0.9 % NaCl 6 mg/30 ml      phenylephrine Stopped (02/06/23 0152)       LDA/Wounds:  Lines/Drains/Airways       Peripherally Inserted Central Catheter Line  Duration             PICC Double Lumen 01/26/23 1209 left basilic 11 days              Drain  Duration                  Closed/Suction Drain 02/02/23 1401 Right Back Bulb 15 Fr. 3 days         Closed/Suction Drain 02/02/23 1402 Left Back Bulb 15 Fr. 3 days         Closed/Suction Drain 02/02/23 1402 Left Back Bulb 15 Fr. 3 days         Closed/Suction Drain 02/02/23 1402 Right Back Bulb 15 Fr. 3 days    Female External Urinary Catheter 02/05/23 0325 1 day              Peripheral Intravenous Line  Duration                  Midline Catheter Insertion/Assessment  - Single Lumen 01/12/23 2138 Right basilic vein (medial side of arm) 18g x 10cm 24 days                  Wounds: No  Wound care consulted: No

## 2023-02-06 NOTE — SUBJECTIVE & OBJECTIVE
Interval History: 2/5: last day of MAPs. Drains with fairly high output overall, Hb stable. Platelets 178.     Medications:  Continuous Infusions:   hydromorphone in 0.9 % NaCl 6 mg/30 ml      phenylephrine 2.5 mcg/kg/min (02/05/23 1801)     Scheduled Meds:   acetaminophen  1,000 mg Oral TID    bisacodyL  10 mg Rectal Daily    buPROPion  300 mg Oral Daily    gabapentin  600 mg Oral TID    heparin (porcine)  5,000 Units Subcutaneous Q8H    lactulose  20 g Oral TID    meropenem (MERREM) IVPB  2 g Intravenous Q8H    methocarbamoL  1,000 mg Oral QID    pantoprazole  40 mg Oral Daily    polyethylene glycol  17 g Oral BID    senna-docusate 8.6-50 mg  2 tablet Oral BID    sodium chloride 0.9%  10 mL Intravenous Q6H     PRN Meds:sodium chloride, sodium chloride, sodium chloride, sodium chloride, sodium chloride, sodium chloride, sodium chloride, dextrose 10%, dextrose 10%, diazePAM, glucagon (human recombinant), glucose, glucose, hydrALAZINE, labetalol, magnesium hydroxide 400 mg/5 ml, magnesium oxide, magnesium oxide, melatonin, naloxone, ondansetron, oxyCODONE, polyethylene glycol, potassium bicarbonate, potassium bicarbonate, potassium bicarbonate, potassium, sodium phosphates, potassium, sodium phosphates, potassium, sodium phosphates, prochlorperazine, sodium chloride 0.9%, Flushing PICC Protocol **AND** sodium chloride 0.9% **AND** sodium chloride 0.9%     Review of Systems  Objective:     Weight: 132 kg (291 lb 0.1 oz)  Body mass index is 45.58 kg/m².  Vital Signs (Most Recent):  Temp: 98.6 °F (37 °C) (02/05/23 1905)  Pulse: (!) 124 (02/05/23 2205)  Resp: (!) 23 (02/05/23 2205)  BP: (!) 106/55 (02/05/23 2205)  SpO2: 99 % (02/05/23 2205) Vital Signs (24h Range):  Temp:  [97.9 °F (36.6 °C)-98.7 °F (37.1 °C)] 98.6 °F (37 °C)  Pulse:  [] 124  Resp:  [10-34] 23  SpO2:  [94 %-99 %] 99 %  BP: ()/(54-85) 106/55     Date 02/05/23 0700 - 02/06/23 0659   Shift 9873-9680 6951-2903 9540-1630 24 Hour Total   INTAKE    I.V.(mL/kg) 370.6(2.8) 191.7(1.5)  562.3(4.3)   IV Piggyback 98.8   98.8   Shift Total(mL/kg) 469.5(3.6) 191.7(1.5)  661.1(5)   OUTPUT   Urine(mL/kg/hr)  500  500   Drains  175  175   Shift Total(mL/kg)  675(5.1)  675(5.1)   Weight (kg) 132 132 132 132                            Neurosurgery Physical Exam  General: well developed, well nourished, no distress.   Head: normocephalic, atraumatic  Neurologic: Alert and oriented. Thought content appropriate.  GCS: Motor: 6/Verbal: 5/Eyes: 4 GCS Total: 15  Mental Status: Awake, Alert, Oriented x 4  Language: No aphasia  Speech: No dysarthria  Cranial nerves: face symmetric, tongue midline, CN II-XII grossly intact.   Eyes: pupils equal, round, reactive to light with accommodation, EOMI, nystagmus.   Pulmonary: normal respirations, no signs of respiratory distress  Abdomen: soft, non-distended, not tender to palpation  Skin: Skin is warm, dry and intact.  Sensory: intact to light touch throughout     Motor Strength:Moves all extremities spontaneously with good tone.  Full strength upper and extremities. No abnormal movements seen.      Strength   Deltoids Triceps Biceps Wrist Extension Wrist Flexion Hand    Upper: R 5/5 5/5 5/5 5/5 5/5 5/5     L 5/5 5/5 5/5 5/5 5/5 5/5       Iliopsoas Quadriceps Knee  Flexion Tibialis  anterior Gastro- cnemius EHL   Lower: R 3/5 4/5 4/5 4+/5 4+/5 4+/5     L 3/5 4/5 4/5 4+/5 4+/5 4+/5      Some pain and habitus limitation     Reflexes:   DTR: 2+ symmetrically throughout.  Parker's: Negative.  Babinski's: Present bilaterally  Clonus: 2 beats bilateral lower extremity     Incision well healed       Significant Labs:  Recent Labs   Lab 02/04/23  0400 02/05/23  0049    118*   * 136   K 3.7 4.0    106   CO2 24 26   BUN 29* 24*   CREATININE 0.4* 0.4*   CALCIUM 7.6* 7.7*   MG 1.9 1.9       Recent Labs   Lab 02/04/23  0400 02/05/23  0049   WBC 12.44 11.22   HGB 7.5* 7.5*   HCT 24.1* 24.5*    178       No results for  input(s): LABPT, INR, APTT in the last 48 hours.    Microbiology Results (last 7 days)       Procedure Component Value Units Date/Time    Fungus culture [[id number]] Collected: 01/24/23 0913    Order Status: Completed Specimen: Body Fluid from Back Updated: 02/02/23 0954     Fungus (Mycology) Culture Culture in progress    Narrative:      2) Perispinal    Fungus culture [[id number]] Collected: 01/24/23 0913    Order Status: Completed Specimen: Body Fluid from Back Updated: 02/02/23 0954     Fungus (Mycology) Culture Culture in progress    Narrative:      1) Perispinal    Fungus culture [[id number]] Collected: 01/24/23 0943    Order Status: Completed Specimen: Bone from Back Updated: 02/02/23 0954     Fungus (Mycology) Culture Culture in progress    Narrative:      3) Perispinal    Fungus culture [[id number]] Collected: 01/24/23 0943    Order Status: Completed Specimen: Bone from Back Updated: 02/02/23 0954     Fungus (Mycology) Culture Culture in progress    Narrative:      4) Perispinal    Blood culture [[id number]] Collected: 01/27/23 0120    Order Status: Completed Specimen: Blood from Peripheral, Forearm, Right Updated: 02/01/23 0612     Blood Culture, Routine No growth after 5 days.    Blood culture [[id number]] Collected: 01/27/23 0123    Order Status: Completed Specimen: Blood from Peripheral, Antecubital, Left Updated: 02/01/23 0612     Blood Culture, Routine No growth after 5 days.    Culture, Anaerobe [[id number]] Collected: 01/24/23 0943    Order Status: Completed Specimen: Bone from Back Updated: 01/30/23 0943     Anaerobic Culture No anaerobes isolated    Narrative:      4) Perispinal    Culture, Anaerobe [[id number]] Collected: 01/24/23 0943    Order Status: Completed Specimen: Bone from Back Updated: 01/30/23 0942     Anaerobic Culture No anaerobes isolated    Narrative:      3) Perispinal    Culture, Anaerobe [[id number]] Collected: 01/24/23 0913    Order Status: Completed Specimen: Body Fluid from Back  Updated: 01/30/23 0942     Anaerobic Culture No anaerobes isolated    Narrative:      2) Perispinal    Culture, Anaerobe [[id number]] Collected: 01/24/23 0913    Order Status: Completed Specimen: Body Fluid from Back Updated: 01/30/23 0859     Anaerobic Culture No anaerobes isolated    Narrative:      1) Perispinal          All pertinent labs from the last 24 hours have been reviewed.    Significant Diagnostics:  CT C-spine:  Vertebrae: The dens, lateral masses, and occipital condyles are intact.  There is no evidence of fracture or dislocation.     Discs: Multilevel disc height loss and degenerative endplate change.  Prominent C6 inferior endplate Schmorl's node, similar in appearance dating back to MRI from 02/14/2022.     Degenerative changes: Sent spinal canal dimensions at C3-C4 are 10.5 mm, 8.5 mm at C4-C5, 10.0 mm at C5-C6, and 10.8 mm at C6-C7 .  Multilevel uncovertebral spurring with multilevel neural foraminal narrowing most pronounced at C5-C6 with moderate right neural foraminal narrowing and C6-C7 with moderate left neural foraminal narrowing.    Physical Exam  Neurosurgery Physical Exam

## 2023-02-06 NOTE — PROGRESS NOTES
Plastic and Reconstructive Surgery   Progress Note    Subjective:  AF. Tachy overnight. Drains with 700cc of SSO.  Prevena with out leak.     Objective:  Vital signs in last 24 hours:  Temp:  [98.5 °F (36.9 °C)-99 °F (37.2 °C)] 99 °F (37.2 °C)  Pulse:  [] 130  Resp:  [12-34] 13  SpO2:  [95 %-99 %] 99 %  BP: ()/(46-85) 98/51    Intake/Output last 3 shifts:  I/O last 3 completed shifts:  In: 6.1 [I.V.:1860.5; IV Piggyback:395.6]  Out:  [Urine:800; Drains:1216]    Intake/Output this shift:  No intake/output data recorded.        Physical Exam:  VITAL SIGNS:   Vitals:    23 0305 23 0405 23 0505 23 0605   BP: (!) 96/54 (!) 91/55 (!) 112/58 (!) 98/51   BP Location: Right arm  Right arm    Patient Position: Lying  Lying    Pulse: (!) 125 (!) 120 (!) 128 (!) 130   Resp: 18 (!) 23 (!) 24 13   Temp: 99 °F (37.2 °C)      TempSrc: Axillary      SpO2: 97% 97% 99% 99%   Weight:       Height:         TMAX: Temp (24hrs), Av.7 °F (37.1 °C), Min:98.5 °F (36.9 °C), Max:99 °F (37.2 °C)    General: Alert; No acute distress  Cardiovascular: Regular rate   Respiratory: Normal respiratory effort. Chest rise symmetric.   Abdomen: Soft, nontender, nondistended  Extremity: Moves all extremities equally.  Neurologic: No focal deficit. Speech normal      Scheduled Medications acetaminophen, 1,000 mg, TID  bisacodyL, 10 mg, Daily  buPROPion, 300 mg, Daily  gabapentin, 600 mg, TID  heparin (porcine), 5,000 Units, Q8H  lactulose, 20 g, TID  meropenem (MERREM) IVPB, 2 g, Q8H  methocarbamoL, 1,000 mg, QID  pantoprazole, 40 mg, Daily  polyethylene glycol, 17 g, BID  senna-docusate 8.6-50 mg, 2 tablet, BID  sodium chloride 0.9%, 10 mL, Q6H      PRN Medications sodium chloride, sodium chloride, sodium chloride, sodium chloride, sodium chloride, sodium chloride, sodium chloride, dextrose 10%, dextrose 10%, diazePAM, glucagon (human recombinant), glucose, glucose, hydrALAZINE, labetalol, magnesium hydroxide  400 mg/5 ml, magnesium oxide, magnesium oxide, melatonin, naloxone, ondansetron, oxyCODONE, polyethylene glycol, potassium bicarbonate, potassium bicarbonate, potassium bicarbonate, potassium, sodium phosphates, potassium, sodium phosphates, potassium, sodium phosphates, prochlorperazine, sodium chloride 0.9%, Flushing PICC Protocol **AND** sodium chloride 0.9% **AND** sodium chloride 0.9%    Recent Labs:   Lab Results   Component Value Date    WBC 6.06 02/06/2023    HGB 7.1 (L) 02/06/2023    HCT 23.5 (L) 02/06/2023    MCV 95 02/06/2023     (L) 02/06/2023     Lab Results   Component Value Date     (H) 02/06/2023     (L) 02/06/2023    K 4.2 02/06/2023     02/06/2023    BUN 19 02/06/2023         Assessment: 42 y.o. y/o female 4 Days Post-Op s/p Procedure(s):  LAMINECTOMY, SPINE, THORACIC, WITH FUSION (T4-Pelvis fusion w/ T9-10 corpectomies) **AIRO Doing well postoperatively.  Wound vac without leak  Drains with SSO     Plan  -Continue to monitor output  -Continue prevena  -Rest of care per primary    CJuanjo Zambrano MD  PGY-1

## 2023-02-06 NOTE — ASSESSMENT & PLAN NOTE
Evaluated by Psychiatry at OSH prior to transfer. Recommended increasing bupropion.  - continue bupropion 300 mg QD  - gabapentin 600 mg TID for anxiety and neuropathy  - PRN diazepam 5 mg q6h

## 2023-02-06 NOTE — SUBJECTIVE & OBJECTIVE
Interval History:  Patient with SOB and leg swelling this morning. Tachycardic to 120s.    Review of Systems   Constitutional:  Negative for chills and fever.   HENT:  Negative for congestion and rhinorrhea.    Eyes:  Negative for pain and redness.   Respiratory:  Positive for shortness of breath. Negative for cough.    Cardiovascular:  Positive for leg swelling. Negative for chest pain.   Gastrointestinal:  Positive for abdominal distention. Negative for abdominal pain, diarrhea and nausea.   Genitourinary:  Negative for dysuria and hematuria.   Musculoskeletal:  Positive for back pain. Negative for myalgias.   Neurological:  Positive for weakness. Negative for headaches.   Psychiatric/Behavioral:  Negative for agitation and confusion.      Objective:     Vitals:  Temp: 98.4 °F (36.9 °C)  Pulse: (!) 122  Rhythm: sinus tachycardia  BP: (!) 117/57  MAP (mmHg): 82  Resp: 20  SpO2: 97 %    Temp  Min: 98.3 °F (36.8 °C)  Max: 99 °F (37.2 °C)  Pulse  Min: 91  Max: 131  BP  Min: 91/55  Max: 128/82  MAP (mmHg)  Min: 66  Max: 92  Resp  Min: 12  Max: 29  SpO2  Min: 95 %  Max: 99 %    02/05 0701 - 02/06 0700  In: 1025.3 [I.V.:727.7]  Out: 1455 [Urine:500; Drains:955]   Unmeasured Output  Urine Occurrence: 1  Stool Occurrence: 1  Pad Count: 2       Physical Exam  Vitals and nursing note reviewed.   Constitutional:       General: She is not in acute distress.  HENT:      Head: Normocephalic and atraumatic.      Nose: Nose normal. No rhinorrhea.      Mouth/Throat:      Mouth: Mucous membranes are moist.      Pharynx: Oropharynx is clear.   Eyes:      General: No scleral icterus.        Right eye: No discharge.         Left eye: No discharge.      Pupils: Pupils are equal, round, and reactive to light.   Cardiovascular:      Rate and Rhythm: Regular rhythm. Tachycardia present.   Pulmonary:      Effort: Pulmonary effort is normal. No respiratory distress.   Abdominal:      General: There is distension.      Tenderness: There is no  abdominal tenderness.   Musculoskeletal:      Right lower leg: Edema present.      Left lower leg: Edema present.   Skin:     General: Skin is warm and dry.      Capillary Refill: Capillary refill takes less than 2 seconds.   Neurological:      Mental Status: She is alert and oriented to person, place, and time.      Comments: BUE 5/5. BLE 3/5 proximally     Medications:  Continuous  hydromorphone in 0.9 % NaCl 6 mg/30 ml    Scheduled  acetaminophen, 1,000 mg, TID  [START ON 2/8/2023] bisacodyL, 10 mg, Every other day  buPROPion, 300 mg, Daily  furosemide, 20 mg, BID  gabapentin, 600 mg, TID  heparin (porcine), 5,000 Units, Q8H  lactulose, 20 g, TID  meropenem (MERREM) IVPB, 2 g, Q8H  methocarbamoL, 1,000 mg, QID  pantoprazole, 40 mg, Daily  senna-docusate 8.6-50 mg, 1 tablet, BID  sodium chloride 0.9%, 10 mL, Q6H  spironolactone, 25 mg, Daily    PRN  sodium chloride, , Q24H PRN  sodium chloride, , Q24H PRN  dextrose 10%, 12.5 g, PRN  dextrose 10%, 25 g, PRN  diazePAM, 5 mg, Q6H PRN  glucagon (human recombinant), 1 mg, PRN  glucose, 16 g, PRN  glucose, 24 g, PRN  hydrALAZINE, 10 mg, Q6H PRN  labetalol, 10 mg, Q4H PRN  magnesium hydroxide 400 mg/5 ml, 30 mL, Daily PRN  magnesium oxide, 800 mg, PRN  magnesium oxide, 800 mg, PRN  melatonin, 6 mg, Nightly PRN  naloxone, 0.02 mg, PRN  ondansetron, 4 mg, Q6H PRN  oxyCODONE, 10 mg, Q4H PRN  polyethylene glycol, 17 g, Daily PRN  potassium bicarbonate, 35 mEq, PRN  potassium bicarbonate, 50 mEq, PRN  potassium bicarbonate, 60 mEq, PRN  potassium, sodium phosphates, 2 packet, PRN  potassium, sodium phosphates, 2 packet, PRN  potassium, sodium phosphates, 2 packet, PRN  prochlorperazine, 2.5 mg, Q6H PRN  sodium chloride 0.9%, 10 mL, Q12H PRN  sodium chloride 0.9%, 10 mL, PRN      Today I personally reviewed pertinent medications, lines/drains/airways, imaging, cardiology results, laboratory results, microbiology results, notably:  EKG sinus tachycardia  Repeat Hb 6.4  KUB no  significant bowel dilation    Diet  Diet Adult Regular (IDDSI Level 7)

## 2023-02-06 NOTE — PROGRESS NOTES
[facility]  Neurosurgery  Progress Note    Subjective:     History of Present Illness: Ms. Zazueta is a 42 y.o F w/ hx ofIV drug use (methamphetamine), chronic HCV with cirrhosis, spinal fusion (04/2021), epidural abscess with removal of hardware (02/2022), spinal fusion with hardware (T10 - Pelvis / 03/2022), obesity (BMI 39.3) and neurogenic bladder and recent shoulder surgery who initially presented to [facility] for evaluation of back pain and left leg weakness.  She reports initially noting the left leg weakness when she was about to go to the bathroom and was about to fall but was assisted by her boyfriend.  She was brought to the ED via EMS.  Urinalysis with UTI concerns and blood cultures at OSH grew ESBL E coli, and shoulder effusion concern for infection so she was treated with meropenem.  During hospitalization, she reports the weakness that started out in her left leg initially then spread upwards to her left thigh, left hip, then crossed over to the right hip and spread to her right leg.  Denies recent IV drug use. She reports her last incidence of drug use was more than 3 years ago and also denies tobacco, and alcohol abuse.  Reports cannabis use.     OSH course notable for concerning urinalysis and blood cultures positive for ESBL E coli treated with meropenem.  She was also admit to the ICU where she was treated with Precedex for significant body aches, irritability, and muscle spasms.  Concerns of a murmur on physical exam so she underwent TTE which did not show any vegetations.  Worsening lower extremity weakness workup with MRI head nonspecific, MRI cervical spine with multilevel spondylosis, X-ray spine with multilevel thoracolumbar fusion and diskectomy, focal kyphosis at T9-10 increased compared to prior.  She was started on prophylaxis amoxicillin due to her history of infected hardware.  MRI spine unable to be completed due to patient's body habitus so she was  transferred to [facility] for further imaging and Neurosurgery, Neurology evaluation.      Post-Op Info:  Procedure(s) (LRB):  LAMINECTOMY, SPINE, THORACIC, WITH FUSION (T4-Pelvis fusion w/ T9-10 corpectomies) **AIRO (N/A)   4 Days Post-Op     Interval History: 2/6: MAP goals complete, drain output with slight increase today. Hb and platelets stable.     Medications:  Continuous Infusions:   hydromorphone in 0.9 % NaCl 6 mg/30 ml      phenylephrine Stopped (02/06/23 0152)     Scheduled Meds:   acetaminophen  1,000 mg Oral TID    bisacodyL  10 mg Rectal Daily    buPROPion  300 mg Oral Daily    gabapentin  600 mg Oral TID    heparin (porcine)  5,000 Units Subcutaneous Q8H    lactulose  20 g Oral TID    meropenem (MERREM) IVPB  2 g Intravenous Q8H    methocarbamoL  1,000 mg Oral QID    pantoprazole  40 mg Oral Daily    polyethylene glycol  17 g Oral BID    senna-docusate 8.6-50 mg  2 tablet Oral BID    sodium chloride 0.9%  10 mL Intravenous Q6H     PRN Meds:sodium chloride, sodium chloride, sodium chloride, sodium chloride, sodium chloride, sodium chloride, sodium chloride, dextrose 10%, dextrose 10%, diazePAM, glucagon (human recombinant), glucose, glucose, hydrALAZINE, labetalol, magnesium hydroxide 400 mg/5 ml, magnesium oxide, magnesium oxide, melatonin, naloxone, ondansetron, oxyCODONE, polyethylene glycol, potassium bicarbonate, potassium bicarbonate, potassium bicarbonate, potassium, sodium phosphates, potassium, sodium phosphates, potassium, sodium phosphates, prochlorperazine, sodium chloride 0.9%, Flushing PICC Protocol **AND** sodium chloride 0.9% **AND** sodium chloride 0.9%     Review of Systems  Objective:     Weight: 132 kg (291 lb 0.1 oz)  Body mass index is 45.58 kg/m².  Vital Signs (Most Recent):  Temp: 98.6 °F (37 °C) (02/06/23 0702)  Pulse: (!) 128 (02/06/23 0902)  Resp: (!) 27 (02/06/23 1002)  BP: (!) 104/56 (02/06/23 0902)  SpO2: 97 % (02/06/23 0902) Vital Signs (24h Range):  Temp:  [98.5  °F (36.9 °C)-99 °F (37.2 °C)] 98.6 °F (37 °C)  Pulse:  [] 128  Resp:  [12-34] 27  SpO2:  [96 %-99 %] 97 %  BP: ()/(46-85) 104/56     Date 02/06/23 0700 - 02/07/23 0659   Shift 0270-2564 5510-4724 3729-3863 24 Hour Total   INTAKE   I.V.(mL/kg) 25.1(0.2)   25.1(0.2)   IV Piggyback 99.5   99.5   Shift Total(mL/kg) 124.6(0.9)   124.6(0.9)   OUTPUT   Urine(mL/kg/hr) 200   200   Drains 245   245   Shift Total(mL/kg) 445(3.4)   445(3.4)   Weight (kg) 132 132 132 132                            Neurosurgery Physical Exam  General: well developed, well nourished, no distress.   Head: normocephalic, atraumatic  Neurologic: Alert and oriented. Thought content appropriate.  GCS: Motor: 6/Verbal: 5/Eyes: 4 GCS Total: 15  Mental Status: Awake, Alert, Oriented x 4  Language: No aphasia  Speech: No dysarthria  Cranial nerves: face symmetric, tongue midline, CN II-XII grossly intact.   Eyes: pupils equal, round, reactive to light with accommodation, EOMI, nystagmus.   Pulmonary: normal respirations, no signs of respiratory distress  Abdomen: soft, non-distended, not tender to palpation  Skin: Skin is warm, dry and intact.  Sensory: intact to light touch throughout     Motor Strength:Moves all extremities spontaneously with good tone.  Full strength upper and extremities. No abnormal movements seen.      Strength   Deltoids Triceps Biceps Wrist Extension Wrist Flexion Hand    Upper: R 5/5 5/5 5/5 5/5 5/5 5/5     L 5/5 5/5 5/5 5/5 5/5 5/5       Iliopsoas Quadriceps Knee  Flexion Tibialis  anterior Gastro- cnemius EHL   Lower: R 3/5 4/5 4/5 4+/5 4+/5 4+/5     L 3/5 4/5 4/5 4+/5 4+/5 4+/5      Some pain and habitus limitation     Reflexes:   DTR: 2+ symmetrically throughout.  Parker's: Negative.  Babinski's: Present bilaterally  Clonus: 2 beats bilateral lower extremity     Incision well healed       Significant Labs:  Recent Labs   Lab 02/05/23  0049 02/06/23  0157   * 123*    134*   K 4.0 4.2    104    CO2 26 24   BUN 24* 19   CREATININE 0.4* 0.4*   CALCIUM 7.7* 7.6*   MG 1.9 1.9       Recent Labs   Lab 02/05/23  0049 02/06/23  0157   WBC 11.22 6.06   HGB 7.5* 7.1*   HCT 24.5* 23.5*    116*       No results for input(s): LABPT, INR, APTT in the last 48 hours.    Microbiology Results (last 7 days)       Procedure Component Value Units Date/Time    Fungus culture [[id number]] Collected: 01/24/23 0913    Order Status: Completed Specimen: Body Fluid from Back Updated: 02/02/23 0954     Fungus (Mycology) Culture Culture in progress    Narrative:      2) Perispinal    Fungus culture [[id number]] Collected: 01/24/23 0913    Order Status: Completed Specimen: Body Fluid from Back Updated: 02/02/23 0954     Fungus (Mycology) Culture Culture in progress    Narrative:      1) Perispinal    Fungus culture [[id number]] Collected: 01/24/23 0943    Order Status: Completed Specimen: Bone from Back Updated: 02/02/23 0954     Fungus (Mycology) Culture Culture in progress    Narrative:      3) Perispinal    Fungus culture [[id number]] Collected: 01/24/23 0943    Order Status: Completed Specimen: Bone from Back Updated: 02/02/23 0954     Fungus (Mycology) Culture Culture in progress    Narrative:      4) Perispinal    Blood culture [[id number]] Collected: 01/27/23 0120    Order Status: Completed Specimen: Blood from Peripheral, Forearm, Right Updated: 02/01/23 0612     Blood Culture, Routine No growth after 5 days.    Blood culture [[id number]] Collected: 01/27/23 0123    Order Status: Completed Specimen: Blood from Peripheral, Antecubital, Left Updated: 02/01/23 0612     Blood Culture, Routine No growth after 5 days.          All pertinent labs from the last 24 hours have been reviewed.    Significant Diagnostics:  CT C-spine:  Vertebrae: The dens, lateral masses, and occipital condyles are intact.  There is no evidence of fracture or dislocation.     Discs: Multilevel disc height loss and degenerative endplate change.  Prominent  C6 inferior endplate Schmorl's node, similar in appearance dating back to MRI from 02/14/2022.     Degenerative changes: Sent spinal canal dimensions at C3-C4 are 10.5 mm, 8.5 mm at C4-C5, 10.0 mm at C5-C6, and 10.8 mm at C6-C7 .  Multilevel uncovertebral spurring with multilevel neural foraminal narrowing most pronounced at C5-C6 with moderate right neural foraminal narrowing and C6-C7 with moderate left neural foraminal narrowing.    Physical Exam  Neurosurgery Physical Exam    Assessment/Plan:     * Weakness of both lower extremities  Pt is 42F multiple spinal surgeries and revisions last T10- Pelvis in March 2022 who presented to OSH with concern for shoulder infection and UTI found to have E coli sepsis who has had progressive worsening BLE weakness, XR showed possible worsening proximal junctional kyphotic deformity. Transferred to [facility] [place] to  and neurosurgery was consulted    OR 1/24 for temporary T6-12 PSIF. Taz pus noted intraoperatively.   OR 2/2 for T4-pelvis revision and extension of fusion with T9-10 corpectomy with plastic surgery assistance       Plan:  -- Admitted to ICU,   -- Maps > 85 complete  -- MRI C/T/L spine 1/20 with C7 SP enhancement, focal kyphosis at T8-10 with severe anterior height loss at T9, enhancing C6 inferior endplate Schmorl's node  -- CT T/L spine thin cut 1/20 with haloing of pelvic and T10 screws, spondylodiscitis T8-9, T9-10  -- CT C spine with concern for discitis at C6-7  -- flex ext XR done, limited view of C6-7 in swimmers view 2/2 shoulders  -- post op xrays pending   -- 1/20 ESR 70, CRP 9.4. elevated from prior   -- TLSO at bedside  --MIHIR drains x4, monitor output  --monitor Hb, liberalize plt goal  --ID: merropenam   -- BCx 1/20: NGTD   -- OR cultures 1/24: E. coli   -- multimodal pain control per primary medicine team  --NSGY will continue to follow. Please contact team with any further questions.        Mabel Chang MD  Neurosurgery  [facility]

## 2023-02-06 NOTE — ASSESSMENT & PLAN NOTE
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 Transferred to [facility] to  and neurosurgery was consulted    OR 1/24 for temporary T6-12 PSIF. Taz pus noted intraoperatively.   OR 2/2 for T4-pelvis revision and extension of fusion with T9-10 corpectomy with plastic surgery assistance       Plan:  -- Admitted to ICU,   -- Maps > 85 through today  -- MRI C/T/L spine 1/20 with C7 SP enhancement, focal kyphosis at T8-10 with severe anterior height loss at T9, enhancing C6 inferior endplate Schmorl's node  -- CT T/L spine thin cut 1/20 with haloing of pelvic and T10 screws, spondylodiscitis T8-9, T9-10  -- CT C spine with concern for discitis at C6-7  -- flex ext XR done, limited view of C6-7 in swimmers view 2/2 shoulders  -- post op xrays pending   -- 1/20 ESR 70, CRP 9.4. elevated from prior   -- TLSO at bedside  --MIHIR drains x4, monitor output  --monitor Hb, platelet goal >100k  --ID: merropenam   -- BCx 1/20: NGTD   -- OR cultures 1/24: E. coli   -- multimodal pain control per primary medicine team  --NSGY will continue to follow. Please contact team with any further questions.

## 2023-02-06 NOTE — PROGRESS NOTES
[facility] - Neuro Critical Care  Neurocritical Care  Progress Note    Admit Date: 1/19/2023  Service Date: 02/06/2023  Length of Stay: 18    Subjective:     Chief Complaint: Weakness of both lower extremities    History of Present Illness: 42-year-old female with a history of IV drug use, chronic hep C with cirrhosis, spinal fusion in April 2021, complicated by epidural abscess with removal of hardware February 2022 and against spinal fusion in March 2022 of T10 through the pelvis as well as neurogenic bladder who initially presented to [facility] in late December for generalized weakness, fatigue and bilateral flank pain.  Her course at [facility] was complicated by a E coli bacteremia thought to be due to a urinary source, she is completed a 7 day course of meropenem on 1/3, and had bcx from 1/2 that did not grow any bacteria.       She also had an ICU admission for ongoing back spasms and agitation requiring Precedex.  In early January patient started developing progressive lower extremity weakness greater in the left compared to the right.  She also had an MRI at the outside hospital that showed some white matter changes concerning for possible demyelinating disease.  However her thoracic spine radiographs also showed worsening of her kyphosis at T9-T10.  Due to the concern for either a demyelinating polyneuropathy, MS, or spinal cord compression the patient was transferred to [facility] for neurosurgical evaluation.       On admission the patient had a CT lumbar and thoracic spine and an MRI CTL spine. The MRI showed  focal kyphosis T8 through T10, with possible associated cord edema signal.  As well as extensive edema throughout the posterior paraspinal musculature concerning for possible infectious or inflammatory process.  There was concern that the findings at T8 through T10 could correspond with compressive myelopathy.  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  There were also erosive changes involving the sacroiliac joints bilaterally which was concerning for sacroiliitis and could be infectious.     Patient underwent laminectomy T8-T10; posterior fusion T8-T12; and washout by NSGY today. No complications noted during surgery. Patient is HDS and on 3L NC. Patient is currently on a PCA dilaudid pump.            Hospital Course: 1/25/2023 NAEO, pain well controlled on current regimen. Continue ABX. Maintain logroll precautions and HOB <30 until second stage of surgery.  01/26/2023 Hemoglobin 6.6, repeat 6.2. Increased drainage noted from bilateral hemovac. S/p 1u pRBC. PICC team consulted for long term access for antibiotics, remove IJ central line when PICC placed.   01/27/2023: NAEON. HV to gravity with decreased drainge. Hgb 7.2 this AM, will trend CBC q12h. Lytes replaced. Bowel regimen adjusted.   01/28/2023: NAEON. Received 1 unit PBRC yesterday. Hgb 7.4 this AM. Plan for OR 2/2.   01/29/2023: NAEON. HV drainage decreasing. Hgb stable. Plan for OR 2/2  01/30/2023  Stable exam. Significant solid BM's  01/31/2023  Exam stable, patient in much better spirits today  02/01/2023  Exam stable  02/02/2023  Post-operatively heavily sedated. Oral airway in place  02/03/2023  Doing very well post operatively. Advance care as appropriate  2/4/2023: d/c gu cath, continue MAP goal until Sunday per NSGY team, new gtt available  2/5/2023: Continue MAP goals until this evening, stepdown to NSGY team tomorrow      Interval History:  Patient with SOB and leg swelling this morning. Tachycardic to 120s.    Review of Systems   Constitutional:  Negative for chills and fever.   HENT:  Negative for congestion and rhinorrhea.    Eyes:  Negative for pain and redness.   Respiratory:  Positive for shortness of breath. Negative for cough.    Cardiovascular:  Positive for leg swelling. Negative for chest pain.    Gastrointestinal:  Positive for abdominal distention. Negative for abdominal pain, diarrhea and nausea.   Genitourinary:  Negative for dysuria and hematuria.   Musculoskeletal:  Positive for back pain. Negative for myalgias.   Neurological:  Positive for weakness. Negative for headaches.   Psychiatric/Behavioral:  Negative for agitation and confusion.      Objective:     Vitals:  Temp: 98.4 °F (36.9 °C)  Pulse: (!) 122  Rhythm: sinus tachycardia  BP: (!) 117/57  MAP (mmHg): 82  Resp: 20  SpO2: 97 %    Temp  Min: 98.3 °F (36.8 °C)  Max: 99 °F (37.2 °C)  Pulse  Min: 91  Max: 131  BP  Min: 91/55  Max: 128/82  MAP (mmHg)  Min: 66  Max: 92  Resp  Min: 12  Max: 29  SpO2  Min: 95 %  Max: 99 %    02/05 0701 - 02/06 0700  In: 1025.3 [I.V.:727.7]  Out: 1455 [Urine:500; Drains:955]   Unmeasured Output  Urine Occurrence: 1  Stool Occurrence: 1  Pad Count: 2       Physical Exam  Vitals and nursing note reviewed.   Constitutional:       General: She is not in acute distress.  HENT:      Head: Normocephalic and atraumatic.      Nose: Nose normal. No rhinorrhea.      Mouth/Throat:      Mouth: Mucous membranes are moist.      Pharynx: Oropharynx is clear.   Eyes:      General: No scleral icterus.        Right eye: No discharge.         Left eye: No discharge.      Pupils: Pupils are equal, round, and reactive to light.   Cardiovascular:      Rate and Rhythm: Regular rhythm. Tachycardia present.   Pulmonary:      Effort: Pulmonary effort is normal. No respiratory distress.   Abdominal:      General: There is distension.      Tenderness: There is no abdominal tenderness.   Musculoskeletal:      Right lower leg: Edema present.      Left lower leg: Edema present.   Skin:     General: Skin is warm and dry.      Capillary Refill: Capillary refill takes less than 2 seconds.   Neurological:      Mental Status: She is alert and oriented to person, place, and time.      Comments: BUE 5/5. BLE 3/5 proximally      Medications:  Continuous  hydromorphone in 0.9 % NaCl 6 mg/30 ml    Scheduled  acetaminophen, 1,000 mg, TID  [START ON 2/8/2023] bisacodyL, 10 mg, Every other day  buPROPion, 300 mg, Daily  furosemide, 20 mg, BID  gabapentin, 600 mg, TID  heparin (porcine), 5,000 Units, Q8H  lactulose, 20 g, TID  meropenem (MERREM) IVPB, 2 g, Q8H  methocarbamoL, 1,000 mg, QID  pantoprazole, 40 mg, Daily  senna-docusate 8.6-50 mg, 1 tablet, BID  sodium chloride 0.9%, 10 mL, Q6H  spironolactone, 25 mg, Daily    PRN  sodium chloride, , Q24H PRN  sodium chloride, , Q24H PRN  dextrose 10%, 12.5 g, PRN  dextrose 10%, 25 g, PRN  diazePAM, 5 mg, Q6H PRN  glucagon (human recombinant), 1 mg, PRN  glucose, 16 g, PRN  glucose, 24 g, PRN  hydrALAZINE, 10 mg, Q6H PRN  labetalol, 10 mg, Q4H PRN  magnesium hydroxide 400 mg/5 ml, 30 mL, Daily PRN  magnesium oxide, 800 mg, PRN  magnesium oxide, 800 mg, PRN  melatonin, 6 mg, Nightly PRN  naloxone, 0.02 mg, PRN  ondansetron, 4 mg, Q6H PRN  oxyCODONE, 10 mg, Q4H PRN  polyethylene glycol, 17 g, Daily PRN  potassium bicarbonate, 35 mEq, PRN  potassium bicarbonate, 50 mEq, PRN  potassium bicarbonate, 60 mEq, PRN  potassium, sodium phosphates, 2 packet, PRN  potassium, sodium phosphates, 2 packet, PRN  potassium, sodium phosphates, 2 packet, PRN  prochlorperazine, 2.5 mg, Q6H PRN  sodium chloride 0.9%, 10 mL, Q12H PRN  sodium chloride 0.9%, 10 mL, PRN      Today I personally reviewed pertinent medications, lines/drains/airways, imaging, cardiology results, laboratory results, microbiology results, notably:  EKG sinus tachycardia  Repeat Hb 6.4  KUB no significant bowel dilation    Diet  Diet Adult Regular (IDDSI Level 7)    Assessment/Plan:     Psychiatric  Anxiety and depression  Evaluated by Psychiatry at OSH prior to transfer. Recommended increasing bupropion.  - continue bupropion 300 mg QD  - gabapentin 600 mg TID for anxiety and neuropathy  - PRN diazepam 5 mg q6h    Substance use disorder  Denies  IV drug use (meth) for past 3 years. Denies alcohol and tobacco abuse.       ID  Thoracic discitis  See Weakness of both lower extremities    Oncology  Acute blood loss anemia  Hb 6.4 on 2/6, received 1U pRBCs.  - monitor Hb  - transfuse to maintain Hb >7    GI  Chronic hepatitis C with cirrhosis  See Cirrhosis of liver with ascites    Cirrhosis of liver with ascites  Patient has reportedly refused transplant evaluation in the past.   - daily CMP and trend LFTs  - continue Lactulose 20 g TID  - Lasix 20 mg PO BID   - will need Hepatology follow up outpatient    MELD-Na score: 16 at 2/6/2023 10:47 AM  MELD score: 13 at 2/6/2023 10:47 AM  Calculated from:  Serum Creatinine: 0.4 mg/dL (Using min of 1 mg/dL) at 2/6/2023  1:57 AM  Serum Sodium: 134 mmol/L at 2/6/2023  1:57 AM  Total Bilirubin: 2.3 mg/dL at 2/6/2023  1:57 AM  INR(ratio): 1.3 at 2/6/2023 10:47 AM  Age: 42 years    Orthopedic  * Weakness of both lower extremities  42 year old woman s/p multiple spinal surgeries presented to OSH with possible infection of shoulder. Found to have UTI and E. Coli bacteremia and progressively worse BLE weakness. Transferred to C for neurosurgical evaluation. Underwent Laminectomy T8-T10; Posterior spinal fusion T8-T12; hardware removal and washout on 1/24. Admitted to [facility] postop. On 2/2 underwent T4-pelvis fusion and T9-T10 corpectomies. To complete 8 week course of meropenem per ID for ESBL E coli from bone culture, end date 3/29.    - neuro checks q2hr daytime and q4hr evening, vitals q1hr, I/Os  - HV drains x 2 in place, management per NSGY  - meropenem for ESBL bacteremia per ID  - Transfuse for Hb <7  - CMP, Mag, Phos, CBC daily  - MAP goals > 65, SBP < 180  - PRN labetalol, hydralazine  - MM pain regimen: PCA Dilaudid pump, Tylenol, gabapentin, robaxin, PRN oxycodone  - Aggressive bowel regimen  - PT/OT as appropriate           The patient is being Prophylaxed for:  Venous Thromboembolism with: Chemical  Stress Ulcer  with: Not Applicable   Ventilator Pneumonia with: not applicable    Activity Orders          Diet Adult Regular (IDDSI Level 7): Regular starting at 02/03 1024    Up in chair With meals starting at 02/02 1700    Progressive Mobility Protocol (mobilize patient to their highest level of functioning at least twice daily) starting at 02/02 1356    Elevate HOB 30 starting at 02/02 1356    Ambulate With Assistance, Post Op Day 0 If the patient arrives from PACU by 4PM, walk at least once on day of operation if alert and safe to do so. starting at 02/02 1355    Turn patient starting at 01/24 1800    Up in chair With meals starting at 01/24 1700    Progressive Mobility Protocol (mobilize patient to their highest level of functioning at least twice daily) starting at 01/24 1105    Elevate HOB 30 starting at 01/24 1105    Ambulate With Assistance, Post Op Day 0 If the patient arrives from PACU by 4PM, walk at least once on day of operation if alert and safe to do so. starting at 01/24 1104        Partial Code    Rachel Marlow MD  Neurocritical Care  [facility] - Neuro Critical Care

## 2023-02-06 NOTE — PLAN OF CARE
[facility] Care Plan    POC reviewed with Rachel Zazueta and family at 0300. Pt verbalized understanding. Questions and concerns addressed. No acute events overnight. Pt progressing toward goals. Will continue to monitor. See below and flowsheets for full assessment and VS info.   -neuro exam unchanged overnight  - Maps goals d/c at 1900. Tristen ggt weaned off overnight  - See flowsheets for drain outputs  - Multiple BM overnight, one formed stool             Is this a stroke patient? no    Neuro:  Jane Coma Scale  Best Eye Response: 4-->(E4) spontaneous  Best Motor Response: 6-->(M6) obeys commands  Best Verbal Response: 5-->(V5) oriented  Jane Coma Scale Score: 15  Assessment Qualifiers: patient not sedated/intubated, no eye obstruction present  Pupil PERRLA: yes     24hr Temp:  [98.5 °F (36.9 °C)-99 °F (37.2 °C)]     CV:   Rhythm: normal sinus rhythm  BP goals:   SBP < 160  MAP > 65    Resp:      Oxygen Concentration (%): 24    Plan: N/A    GI/:     Diet/Nutrition Received: regular  Last Bowel Movement: 02/06/23  Voiding Characteristics: external catheter    Intake/Output Summary (Last 24 hours) at 2/6/2023 0532  Last data filed at 2/6/2023 0505  Gross per 24 hour   Intake 1104.27 ml   Output 1316 ml   Net -211.73 ml     Unmeasured Output  Urine Occurrence: 1  Stool Occurrence: 0  Pad Count: 1    Labs/Accuchecks:  Recent Labs   Lab 02/06/23  0157   WBC 6.06   RBC 2.47*   HGB 7.1*   HCT 23.5*   *      Recent Labs   Lab 02/06/23  0157   *   K 4.2   CO2 24      BUN 19   CREATININE 0.4*   ALKPHOS 127   ALT 34   AST 45*   BILITOT 2.3*      Recent Labs   Lab 02/01/23  1113   INR 1.2    No results for input(s): CPK, CPKMB, TROPONINI, MB in the last 168 hours.    Electrolytes: N/A - electrolytes WDL  Accuchecks: none    Gtts:   hydromorphone in 0.9 % NaCl 6 mg/30 ml      phenylephrine 0.5 mcg/kg/min (02/05/23 4298)       LDA/Wounds:  Lines/Drains/Airways       Peripherally Inserted Central Catheter  Line  Duration             PICC Double Lumen 01/26/23 1209 left basilic 10 days              Drain  Duration                  Closed/Suction Drain 02/02/23 1401 Right Back Bulb 15 Fr. 3 days         Closed/Suction Drain 02/02/23 1402 Left Back Bulb 15 Fr. 3 days         Closed/Suction Drain 02/02/23 1402 Left Back Bulb 15 Fr. 3 days         Closed/Suction Drain 02/02/23 1402 Right Back Bulb 15 Fr. 3 days    Female External Urinary Catheter 02/05/23 0325 1 day              Peripheral Intravenous Line  Duration                  Midline Catheter Insertion/Assessment  - Single Lumen 01/12/23 2138 Right basilic vein (medial side of arm) 18g x 10cm 24 days         Peripheral IV - Single Lumen 02/02/23 0900 20 G Left Hand 3 days                  Wounds: Yes  Wound care consulted: No   Problem: Adjustment to Illness (Sepsis/Septic Shock)  Goal: Optimal Coping  Outcome: Ongoing, Progressing  Intervention: Optimize Psychosocial Adjustment to Illness  Flowsheets (Taken 2/6/2023 0532)  Supportive Measures: active listening utilized  Family/Support System Care: involvement promoted     Problem: Infection  Goal: Absence of Infection Signs and Symptoms  Outcome: Ongoing, Progressing  Intervention: Prevent or Manage Infection  Flowsheets (Taken 2/6/2023 0532)  Fever Reduction/Comfort Measures:   lightweight bedding   lightweight clothing  Infection Management: aseptic technique maintained  Isolation Precautions: precautions maintained     Problem: Adjustment to Illness (Stroke, Hemorrhagic)  Goal: Optimal Coping  Outcome: Ongoing, Progressing  Intervention: Support Psychosocial Response to Stroke  Flowsheets (Taken 2/6/2023 0532)  Supportive Measures: active listening utilized  Family/Support System Care: involvement promoted

## 2023-02-06 NOTE — PROGRESS NOTES
[facility]  Neurosurgery  Progress Note    Subjective:     History of Present Illness: Ms. Zazueta is a 42 y.o F w/ hx ofIV drug use (methamphetamine), chronic HCV with cirrhosis, spinal fusion (04/2021), epidural abscess with removal of hardware (02/2022), spinal fusion with hardware (T10 - Pelvis / 03/2022), obesity (BMI 39.3) and neurogenic bladder and recent shoulder surgery who initially presented to [facility] for evaluation of back pain and left leg weakness.  She reports initially noting the left leg weakness when she was about to go to the bathroom and was about to fall but was assisted by her boyfriend.  She was brought to the ED via EMS.  Urinalysis with UTI concerns and blood cultures at OSH grew ESBL E coli, and shoulder effusion concern for infection so she was treated with meropenem.  During hospitalization, she reports the weakness that started out in her left leg initially then spread upwards to her left thigh, left hip, then crossed over to the right hip and spread to her right leg.  Denies recent IV drug use. She reports her last incidence of drug use was more than 3 years ago and also denies tobacco, and alcohol abuse.  Reports cannabis use.     OSH course notable for concerning urinalysis and blood cultures positive for ESBL E coli treated with meropenem.  She was also admit to the ICU where she was treated with Precedex for significant body aches, irritability, and muscle spasms.  Concerns of a murmur on physical exam so she underwent TTE which did not show any vegetations.  Worsening lower extremity weakness workup with MRI head nonspecific, MRI cervical spine with multilevel spondylosis, X-ray spine with multilevel thoracolumbar fusion and diskectomy, focal kyphosis at T9-10 increased compared to prior.  She was started on prophylaxis amoxicillin due to her history of infected hardware.  MRI spine unable to be completed due to patient's body habitus so she was  [place] [facility] [place] and Neurosurgery, Neurology evaluation.      Post-Op Info:  Procedure(s) (LRB):  LAMINECTOMY, SPINE, THORACIC, WITH FUSION (T4-Pelvis fusion w/ T9-10 corpectomies) **AIRO (N/A)   3 Days Post-Op     Interval History: 2/5: last day of MAPs. Drains with fairly high output overall, Hb stable. Platelets 178.     Medications:  Continuous Infusions:   hydromorphone in 0.9 % NaCl 6 mg/30 ml      phenylephrine 2.5 mcg/kg/min (02/05/23 1801)     Scheduled Meds:   acetaminophen  1,000 mg Oral TID    bisacodyL  10 mg Rectal Daily    buPROPion  300 mg Oral Daily    gabapentin  600 mg Oral TID    heparin (porcine)  5,000 Units Subcutaneous Q8H    lactulose  20 g Oral TID    meropenem (MERREM) IVPB  2 g Intravenous Q8H    methocarbamoL  1,000 mg Oral QID    pantoprazole  40 mg Oral Daily    polyethylene glycol  17 g Oral BID    senna-docusate 8.6-50 mg  2 tablet Oral BID    sodium chloride 0.9%  10 mL Intravenous Q6H     PRN Meds:sodium chloride, sodium chloride, sodium chloride, sodium chloride, sodium chloride, sodium chloride, sodium chloride, dextrose 10%, dextrose 10%, diazePAM, glucagon (human recombinant), glucose, glucose, hydrALAZINE, labetalol, magnesium hydroxide 400 mg/5 ml, magnesium oxide, magnesium oxide, melatonin, naloxone, ondansetron, oxyCODONE, polyethylene glycol, potassium bicarbonate, potassium bicarbonate, potassium bicarbonate, potassium, sodium phosphates, potassium, sodium phosphates, potassium, sodium phosphates, prochlorperazine, sodium chloride 0.9%, Flushing PICC Protocol **AND** sodium chloride 0.9% **AND** sodium chloride 0.9%     Review of Systems  Objective:     Weight: 132 kg (291 lb 0.1 oz)  Body mass index is 45.58 kg/m².  Vital Signs (Most Recent):  Temp: 98.6 °F (37 °C) (02/05/23 1905)  Pulse: (!) 124 (02/05/23 2205)  Resp: (!) 23 (02/05/23 2205)  BP: (!) 106/55 (02/05/23 2205)  SpO2: 99 % (02/05/23 2205) Vital Signs (24h Range):  Temp:   [97.9 °F (36.6 °C)-98.7 °F (37.1 °C)] 98.6 °F (37 °C)  Pulse:  [] 124  Resp:  [10-34] 23  SpO2:  [94 %-99 %] 99 %  BP: ()/(54-85) 106/55     Date 02/05/23 0700 - 02/06/23 0659   Shift 3009-9388 6814-5496 4760-1046 24 Hour Total   INTAKE   I.V.(mL/kg) 370.6(2.8) 191.7(1.5)  562.3(4.3)   IV Piggyback 98.8   98.8   Shift Total(mL/kg) 469.5(3.6) 191.7(1.5)  661.1(5)   OUTPUT   Urine(mL/kg/hr)  500  500   Drains  175  175   Shift Total(mL/kg)  675(5.1)  675(5.1)   Weight (kg) 132 132 132 132                            Neurosurgery Physical Exam  General: well developed, well nourished, no distress.   Head: normocephalic, atraumatic  Neurologic: Alert and oriented. Thought content appropriate.  GCS: Motor: 6/Verbal: 5/Eyes: 4 GCS Total: 15  Mental Status: Awake, Alert, Oriented x 4  Language: No aphasia  Speech: No dysarthria  Cranial nerves: face symmetric, tongue midline, CN II-XII grossly intact.   Eyes: pupils equal, round, reactive to light with accommodation, EOMI, nystagmus.   Pulmonary: normal respirations, no signs of respiratory distress  Abdomen: soft, non-distended, not tender to palpation  Skin: Skin is warm, dry and intact.  Sensory: intact to light touch throughout     Motor Strength:Moves all extremities spontaneously with good tone.  Full strength upper and extremities. No abnormal movements seen.      Strength   Deltoids Triceps Biceps Wrist Extension Wrist Flexion Hand    Upper: R 5/5 5/5 5/5 5/5 5/5 5/5     L 5/5 5/5 5/5 5/5 5/5 5/5       Iliopsoas Quadriceps Knee  Flexion Tibialis  anterior Gastro- cnemius EHL   Lower: R 3/5 4/5 4/5 4+/5 4+/5 4+/5     L 3/5 4/5 4/5 4+/5 4+/5 4+/5      Some pain and habitus limitation     Reflexes:   DTR: 2+ symmetrically throughout.  Parker's: Negative.  Babinski's: Present bilaterally  Clonus: 2 beats bilateral lower extremity     Incision well healed       Significant Labs:  Recent Labs   Lab 02/04/23  0400 02/05/23  0049    118*   *  136   K 3.7 4.0    106   CO2 24 26   BUN 29* 24*   CREATININE 0.4* 0.4*   CALCIUM 7.6* 7.7*   MG 1.9 1.9       Recent Labs   Lab 02/04/23  0400 02/05/23  0049   WBC 12.44 11.22   HGB 7.5* 7.5*   HCT 24.1* 24.5*    178       No results for input(s): LABPT, INR, APTT in the last 48 hours.    Microbiology Results (last 7 days)       Procedure Component Value Units Date/Time    Fungus culture [[id number]] Collected: 01/24/23 0913    Order Status: Completed Specimen: Body Fluid from Back Updated: 02/02/23 0954     Fungus (Mycology) Culture Culture in progress    Narrative:      2) Perispinal    Fungus culture [[id number]] Collected: 01/24/23 0913    Order Status: Completed Specimen: Body Fluid from Back Updated: 02/02/23 0954     Fungus (Mycology) Culture Culture in progress    Narrative:      1) Perispinal    Fungus culture [[id number]] Collected: 01/24/23 0943    Order Status: Completed Specimen: Bone from Back Updated: 02/02/23 0954     Fungus (Mycology) Culture Culture in progress    Narrative:      3) Perispinal    Fungus culture [[id number]] Collected: 01/24/23 0943    Order Status: Completed Specimen: Bone from Back Updated: 02/02/23 0954     Fungus (Mycology) Culture Culture in progress    Narrative:      4) Perispinal    Blood culture [[id number]] Collected: 01/27/23 0120    Order Status: Completed Specimen: Blood from Peripheral, Forearm, Right Updated: 02/01/23 0612     Blood Culture, Routine No growth after 5 days.    Blood culture [[id number]] Collected: 01/27/23 0123    Order Status: Completed Specimen: Blood from Peripheral, Antecubital, Left Updated: 02/01/23 0612     Blood Culture, Routine No growth after 5 days.    Culture, Anaerobe [[id number]] Collected: 01/24/23 0943    Order Status: Completed Specimen: Bone from Back Updated: 01/30/23 0943     Anaerobic Culture No anaerobes isolated    Narrative:      4) Perispinal    Culture, Anaerobe [[id number]] Collected: 01/24/23 0943    Order Status:  Completed Specimen: Bone from Back Updated: 01/30/23 0942     Anaerobic Culture No anaerobes isolated    Narrative:      3) Perispinal    Culture, Anaerobe [[id number]] Collected: 01/24/23 0913    Order Status: Completed Specimen: Body Fluid from Back Updated: 01/30/23 0942     Anaerobic Culture No anaerobes isolated    Narrative:      2) Perispinal    Culture, Anaerobe [[id number]] Collected: 01/24/23 0913    Order Status: Completed Specimen: Body Fluid from Back Updated: 01/30/23 0859     Anaerobic Culture No anaerobes isolated    Narrative:      1) Perispinal          All pertinent labs from the last 24 hours have been reviewed.    Significant Diagnostics:  CT C-spine:  Vertebrae: The dens, lateral masses, and occipital condyles are intact.  There is no evidence of fracture or dislocation.     Discs: Multilevel disc height loss and degenerative endplate change.  Prominent C6 inferior endplate Schmorl's node, similar in appearance dating back to MRI from 02/14/2022.     Degenerative changes: Sent spinal canal dimensions at C3-C4 are 10.5 mm, 8.5 mm at C4-C5, 10.0 mm at C5-C6, and 10.8 mm at C6-C7 .  Multilevel uncovertebral spurring with multilevel neural foraminal narrowing most pronounced at C5-C6 with moderate right neural foraminal narrowing and C6-C7 with moderate left neural foraminal narrowing.    Physical Exam  Neurosurgery Physical Exam    Assessment/Plan:     * Weakness of both lower extremities  Pt is 42F multiple spinal surgeries and revisions last T10- Pelvis in March 2022 who presented to OSH with concern for shoulder infection and UTI found to have E coli sepsis who has had progressive worsening BLE weakness, XR showed possible worsening proximal junctional kyphotic deformity. Transferred to [facility] to  and neurosurgery was consulted    OR 1/24 for temporary T6-12 PSIF. Taz pus noted intraoperatively.   OR 2/2 for T4-pelvis revision and extension of fusion with T9-10 corpectomy with plastic surgery  assistance       Plan:  -- Admitted to ICU,   -- Maps > 85 through today  -- MRI C/T/L spine 1/20 with C7 SP enhancement, focal kyphosis at T8-10 with severe anterior height loss at T9, enhancing C6 inferior endplate Schmorl's node  -- CT T/L spine thin cut 1/20 with haloing of pelvic and T10 screws, spondylodiscitis T8-9, T9-10  -- CT C spine with concern for discitis at C6-7  -- flex ext XR done, limited view of C6-7 in swimmers view 2/2 shoulders  -- post op xrays pending   -- 1/20 ESR 70, CRP 9.4. elevated from prior   -- TLSO at bedside  --MIHIR drains x4, monitor output  --monitor Hb, platelet goal >100k  --ID: merropenam   -- BCx 1/20: NGTD   -- OR cultures 1/24: E. coli   -- multimodal pain control per primary medicine team  --NSGY will continue to follow. Please contact team with any further questions.        Emily Hercules MD  Neurosurgery  [facility]

## 2023-02-06 NOTE — SUBJECTIVE & OBJECTIVE
Interval History: 2/6: MAP goals complete, drain output with slight increase today. Hb and platelets stable.     Medications:  Continuous Infusions:   hydromorphone in 0.9 % NaCl 6 mg/30 ml      phenylephrine Stopped (02/06/23 0152)     Scheduled Meds:   acetaminophen  1,000 mg Oral TID    bisacodyL  10 mg Rectal Daily    buPROPion  300 mg Oral Daily    gabapentin  600 mg Oral TID    heparin (porcine)  5,000 Units Subcutaneous Q8H    lactulose  20 g Oral TID    meropenem (MERREM) IVPB  2 g Intravenous Q8H    methocarbamoL  1,000 mg Oral QID    pantoprazole  40 mg Oral Daily    polyethylene glycol  17 g Oral BID    senna-docusate 8.6-50 mg  2 tablet Oral BID    sodium chloride 0.9%  10 mL Intravenous Q6H     PRN Meds:sodium chloride, sodium chloride, sodium chloride, sodium chloride, sodium chloride, sodium chloride, sodium chloride, dextrose 10%, dextrose 10%, diazePAM, glucagon (human recombinant), glucose, glucose, hydrALAZINE, labetalol, magnesium hydroxide 400 mg/5 ml, magnesium oxide, magnesium oxide, melatonin, naloxone, ondansetron, oxyCODONE, polyethylene glycol, potassium bicarbonate, potassium bicarbonate, potassium bicarbonate, potassium, sodium phosphates, potassium, sodium phosphates, potassium, sodium phosphates, prochlorperazine, sodium chloride 0.9%, Flushing PICC Protocol **AND** sodium chloride 0.9% **AND** sodium chloride 0.9%     Review of Systems  Objective:     Weight: 132 kg (291 lb 0.1 oz)  Body mass index is 45.58 kg/m².  Vital Signs (Most Recent):  Temp: 98.6 °F (37 °C) (02/06/23 0702)  Pulse: (!) 128 (02/06/23 0902)  Resp: (!) 27 (02/06/23 1002)  BP: (!) 104/56 (02/06/23 0902)  SpO2: 97 % (02/06/23 0902) Vital Signs (24h Range):  Temp:  [98.5 °F (36.9 °C)-99 °F (37.2 °C)] 98.6 °F (37 °C)  Pulse:  [] 128  Resp:  [12-34] 27  SpO2:  [96 %-99 %] 97 %  BP: ()/(46-85) 104/56     Date 02/06/23 0700 - 02/07/23 0659   Shift 9404-7853 0390-7671 1939-0191 24 Hour Total   INTAKE    I.V.(mL/kg) 25.1(0.2)   25.1(0.2)   IV Piggyback 99.5   99.5   Shift Total(mL/kg) 124.6(0.9)   124.6(0.9)   OUTPUT   Urine(mL/kg/hr) 200   200   Drains 245   245   Shift Total(mL/kg) 445(3.4)   445(3.4)   Weight (kg) 132 132 132 132                            Neurosurgery Physical Exam  General: well developed, well nourished, no distress.   Head: normocephalic, atraumatic  Neurologic: Alert and oriented. Thought content appropriate.  GCS: Motor: 6/Verbal: 5/Eyes: 4 GCS Total: 15  Mental Status: Awake, Alert, Oriented x 4  Language: No aphasia  Speech: No dysarthria  Cranial nerves: face symmetric, tongue midline, CN II-XII grossly intact.   Eyes: pupils equal, round, reactive to light with accommodation, EOMI, nystagmus.   Pulmonary: normal respirations, no signs of respiratory distress  Abdomen: soft, non-distended, not tender to palpation  Skin: Skin is warm, dry and intact.  Sensory: intact to light touch throughout     Motor Strength:Moves all extremities spontaneously with good tone.  Full strength upper and extremities. No abnormal movements seen.      Strength   Deltoids Triceps Biceps Wrist Extension Wrist Flexion Hand    Upper: R 5/5 5/5 5/5 5/5 5/5 5/5     L 5/5 5/5 5/5 5/5 5/5 5/5       Iliopsoas Quadriceps Knee  Flexion Tibialis  anterior Gastro- cnemius EHL   Lower: R 3/5 4/5 4/5 4+/5 4+/5 4+/5     L 3/5 4/5 4/5 4+/5 4+/5 4+/5      Some pain and habitus limitation     Reflexes:   DTR: 2+ symmetrically throughout.  Parker's: Negative.  Babinski's: Present bilaterally  Clonus: 2 beats bilateral lower extremity     Incision well healed       Significant Labs:  Recent Labs   Lab 02/05/23  0049 02/06/23  0157   * 123*    134*   K 4.0 4.2    104   CO2 26 24   BUN 24* 19   CREATININE 0.4* 0.4*   CALCIUM 7.7* 7.6*   MG 1.9 1.9       Recent Labs   Lab 02/05/23  0049 02/06/23  0157   WBC 11.22 6.06   HGB 7.5* 7.1*   HCT 24.5* 23.5*    116*       No results for input(s): LABPT,  INR, APTT in the last 48 hours.    Microbiology Results (last 7 days)       Procedure Component Value Units Date/Time    Fungus culture [[id number]] Collected: 01/24/23 0913    Order Status: Completed Specimen: Body Fluid from Back Updated: 02/02/23 0954     Fungus (Mycology) Culture Culture in progress    Narrative:      2) Perispinal    Fungus culture [[id number]] Collected: 01/24/23 0913    Order Status: Completed Specimen: Body Fluid from Back Updated: 02/02/23 0954     Fungus (Mycology) Culture Culture in progress    Narrative:      1) Perispinal    Fungus culture [[id number]] Collected: 01/24/23 0943    Order Status: Completed Specimen: Bone from Back Updated: 02/02/23 0954     Fungus (Mycology) Culture Culture in progress    Narrative:      3) Perispinal    Fungus culture [[id number]] Collected: 01/24/23 0943    Order Status: Completed Specimen: Bone from Back Updated: 02/02/23 0954     Fungus (Mycology) Culture Culture in progress    Narrative:      4) Perispinal    Blood culture [[id number]] Collected: 01/27/23 0120    Order Status: Completed Specimen: Blood from Peripheral, Forearm, Right Updated: 02/01/23 0612     Blood Culture, Routine No growth after 5 days.    Blood culture [[id number]] Collected: 01/27/23 0123    Order Status: Completed Specimen: Blood from Peripheral, Antecubital, Left Updated: 02/01/23 0612     Blood Culture, Routine No growth after 5 days.          All pertinent labs from the last 24 hours have been reviewed.    Significant Diagnostics:  CT C-spine:  Vertebrae: The dens, lateral masses, and occipital condyles are intact.  There is no evidence of fracture or dislocation.     Discs: Multilevel disc height loss and degenerative endplate change.  Prominent C6 inferior endplate Schmorl's node, similar in appearance dating back to MRI from 02/14/2022.     Degenerative changes: Sent spinal canal dimensions at C3-C4 are 10.5 mm, 8.5 mm at C4-C5, 10.0 mm at C5-C6, and 10.8 mm at C6-C7 .   Multilevel uncovertebral spurring with multilevel neural foraminal narrowing most pronounced at C5-C6 with moderate right neural foraminal narrowing and C6-C7 with moderate left neural foraminal narrowing.    Physical Exam  Neurosurgery Physical Exam

## 2023-02-06 NOTE — PLAN OF CARE
02/06/23 1704   Post-Acute Status   Post-Acute Authorization Placement   Discharge Plan   Discharge Plan A Long-term acute care facility (LTAC)     1.[facility] Phone: [phone]  -willing to accept patient; We are interested but she would have to be cleared from Neurosurgery prior to admit. [place] [phone]     2.[facility] Phone: [phone] - Yes, willing to accept patient      PT not medically ready for d/c      Julissa Oswald LMSW  PRN - [facility]  [id number]

## 2023-02-06 NOTE — ASSESSMENT & PLAN NOTE
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 Transferred [place] [facility] [place]  and neurosurgery was consulted    OR 1/24 for temporary T6-12 PSIF. Taz pus noted intraoperatively.   OR 2/2 for T4-pelvis revision and extension of fusion with T9-10 corpectomy with plastic surgery assistance       Plan:  -- Admitted to ICU,   -- Maps > 85 complete  -- MRI C/T/L spine 1/20 with C7 SP enhancement, focal kyphosis at T8-10 with severe anterior height loss at T9, enhancing C6 inferior endplate Schmorl's node  -- CT T/L spine thin cut 1/20 with haloing of pelvic and T10 screws, spondylodiscitis T8-9, T9-10  -- CT C spine with concern for discitis at C6-7  -- flex ext XR done, limited view of C6-7 in swimmers view 2/2 shoulders  -- post op xrays pending   -- 1/20 ESR 70, CRP 9.4. elevated from prior   -- TLSO at bedside  --MIHIR drains x4, monitor output  --monitor Hb, liberalize plt goal  --ID: merropenam   -- BCx 1/20: NGTD   -- OR cultures 1/24: E. coli   -- multimodal pain control per primary medicine team  --NSGY will continue to follow. Please contact team with any further questions.

## 2023-02-06 NOTE — CONSULTS
Wound care consult received for weeping legs. The bilateral legs are clean, dry, intact, and are not weeping. Wound care signing off, re-consult as needed

## 2023-02-07 ENCOUNTER — PATIENT OUTREACH (OUTPATIENT)
Dept: ADMINISTRATIVE | Facility: HOSPITAL | Age: 43
End: 2023-02-07
Payer: MEDICAID

## 2023-02-07 LAB
ABO + RH BLD: NORMAL
ALBUMIN SERPL BCP-MCNC: 1.9 G/DL (ref 3.5–5.2)
ALP SERPL-CCNC: 130 U/L (ref 55–135)
ALT SERPL W/O P-5'-P-CCNC: 28 U/L (ref 10–44)
ANION GAP SERPL CALC-SCNC: 6 MMOL/L (ref 8–16)
AST SERPL-CCNC: 39 U/L (ref 10–40)
BASOPHILS # BLD AUTO: 0.04 K/UL (ref 0–0.2)
BASOPHILS NFR BLD: 0.7 % (ref 0–1.9)
BILIRUB SERPL-MCNC: 1.9 MG/DL (ref 0.1–1)
BLD GP AB SCN CELLS X3 SERPL QL: NORMAL
BUN SERPL-MCNC: 18 MG/DL (ref 6–20)
CALCIUM SERPL-MCNC: 7.6 MG/DL (ref 8.7–10.5)
CHLORIDE SERPL-SCNC: 103 MMOL/L (ref 95–110)
CO2 SERPL-SCNC: 23 MMOL/L (ref 23–29)
CREAT SERPL-MCNC: 0.5 MG/DL (ref 0.5–1.4)
DIFFERENTIAL METHOD: ABNORMAL
EOSINOPHIL # BLD AUTO: 0.4 K/UL (ref 0–0.5)
EOSINOPHIL NFR BLD: 6.1 % (ref 0–8)
ERYTHROCYTE [DISTWIDTH] IN BLOOD BY AUTOMATED COUNT: 21.1 % (ref 11.5–14.5)
EST. GFR  (NO RACE VARIABLE): >60 ML/MIN/1.73 M^2
GLUCOSE SERPL-MCNC: 119 MG/DL (ref 70–110)
HCT VFR BLD AUTO: 24.8 % (ref 37–48.5)
HGB BLD-MCNC: 7.6 G/DL (ref 12–16)
IMM GRANULOCYTES # BLD AUTO: 0.04 K/UL (ref 0–0.04)
IMM GRANULOCYTES NFR BLD AUTO: 0.7 % (ref 0–0.5)
LYMPHOCYTES # BLD AUTO: 0.7 K/UL (ref 1–4.8)
LYMPHOCYTES NFR BLD: 12.1 % (ref 18–48)
MAGNESIUM SERPL-MCNC: 1.8 MG/DL (ref 1.6–2.6)
MCH RBC QN AUTO: 29 PG (ref 27–31)
MCHC RBC AUTO-ENTMCNC: 30.6 G/DL (ref 32–36)
MCV RBC AUTO: 95 FL (ref 82–98)
MONOCYTES # BLD AUTO: 0.8 K/UL (ref 0.3–1)
MONOCYTES NFR BLD: 12.3 % (ref 4–15)
NEUTROPHILS # BLD AUTO: 4.2 K/UL (ref 1.8–7.7)
NEUTROPHILS NFR BLD: 68.1 % (ref 38–73)
NRBC BLD-RTO: 1 /100 WBC
PHOSPHATE SERPL-MCNC: 2.7 MG/DL (ref 2.7–4.5)
PLATELET # BLD AUTO: 121 K/UL (ref 150–450)
PMV BLD AUTO: 9.7 FL (ref 9.2–12.9)
POTASSIUM SERPL-SCNC: 4 MMOL/L (ref 3.5–5.1)
PROT SERPL-MCNC: 4.7 G/DL (ref 6–8.4)
RBC # BLD AUTO: 2.62 M/UL (ref 4–5.4)
SODIUM SERPL-SCNC: 132 MMOL/L (ref 136–145)
WBC # BLD AUTO: 6.11 K/UL (ref 3.9–12.7)

## 2023-02-07 PROCEDURE — A4216 STERILE WATER/SALINE, 10 ML: HCPCS | Performed by: PSYCHIATRY & NEUROLOGY

## 2023-02-07 PROCEDURE — 63600175 PHARM REV CODE 636 W HCPCS: Performed by: NURSE PRACTITIONER

## 2023-02-07 PROCEDURE — 25000003 PHARM REV CODE 250

## 2023-02-07 PROCEDURE — 97530 THERAPEUTIC ACTIVITIES: CPT | Mod: CQ

## 2023-02-07 PROCEDURE — 25000003 PHARM REV CODE 250: Performed by: PSYCHIATRY & NEUROLOGY

## 2023-02-07 PROCEDURE — 99900035 HC TECH TIME PER 15 MIN (STAT)

## 2023-02-07 PROCEDURE — 94761 N-INVAS EAR/PLS OXIMETRY MLT: CPT

## 2023-02-07 PROCEDURE — 99233 PR SUBSEQUENT HOSPITAL CARE,LEVL III: ICD-10-PCS | Mod: ,,, | Performed by: PSYCHIATRY & NEUROLOGY

## 2023-02-07 PROCEDURE — 63600175 PHARM REV CODE 636 W HCPCS: Performed by: STUDENT IN AN ORGANIZED HEALTH CARE EDUCATION/TRAINING PROGRAM

## 2023-02-07 PROCEDURE — 83735 ASSAY OF MAGNESIUM: CPT | Performed by: STUDENT IN AN ORGANIZED HEALTH CARE EDUCATION/TRAINING PROGRAM

## 2023-02-07 PROCEDURE — 25000003 PHARM REV CODE 250: Performed by: STUDENT IN AN ORGANIZED HEALTH CARE EDUCATION/TRAINING PROGRAM

## 2023-02-07 PROCEDURE — 63600175 PHARM REV CODE 636 W HCPCS

## 2023-02-07 PROCEDURE — 27000207 HC ISOLATION

## 2023-02-07 PROCEDURE — 20600001 HC STEP DOWN PRIVATE ROOM

## 2023-02-07 PROCEDURE — 84100 ASSAY OF PHOSPHORUS: CPT | Performed by: STUDENT IN AN ORGANIZED HEALTH CARE EDUCATION/TRAINING PROGRAM

## 2023-02-07 PROCEDURE — 86900 BLOOD TYPING SEROLOGIC ABO: CPT

## 2023-02-07 PROCEDURE — 25000003 PHARM REV CODE 250: Performed by: INTERNAL MEDICINE

## 2023-02-07 PROCEDURE — 85025 COMPLETE CBC W/AUTO DIFF WBC: CPT

## 2023-02-07 PROCEDURE — 99233 SBSQ HOSP IP/OBS HIGH 50: CPT | Mod: ,,, | Performed by: PSYCHIATRY & NEUROLOGY

## 2023-02-07 PROCEDURE — 80053 COMPREHEN METABOLIC PANEL: CPT | Performed by: STUDENT IN AN ORGANIZED HEALTH CARE EDUCATION/TRAINING PROGRAM

## 2023-02-07 RX ORDER — AMOXICILLIN 250 MG
1 CAPSULE ORAL 2 TIMES DAILY PRN
Status: DISCONTINUED | OUTPATIENT
Start: 2023-02-07 | End: 2023-02-28 | Stop reason: HOSPADM

## 2023-02-07 RX ADMIN — ACETAMINOPHEN 1000 MG: 500 TABLET ORAL at 08:02

## 2023-02-07 RX ADMIN — LACTULOSE 20 G: 20 SOLUTION ORAL at 09:02

## 2023-02-07 RX ADMIN — POTASSIUM & SODIUM PHOSPHATES POWDER PACK 280-160-250 MG 2 PACKET: 280-160-250 PACK at 05:02

## 2023-02-07 RX ADMIN — Medication 800 MG: at 05:02

## 2023-02-07 RX ADMIN — GABAPENTIN 600 MG: 300 CAPSULE ORAL at 03:02

## 2023-02-07 RX ADMIN — OXYCODONE HYDROCHLORIDE 10 MG: 10 TABLET ORAL at 09:02

## 2023-02-07 RX ADMIN — HEPARIN SODIUM 5000 UNITS: 5000 INJECTION INTRAVENOUS; SUBCUTANEOUS at 05:02

## 2023-02-07 RX ADMIN — OXYCODONE HYDROCHLORIDE 10 MG: 10 TABLET ORAL at 10:02

## 2023-02-07 RX ADMIN — HEPARIN SODIUM 5000 UNITS: 5000 INJECTION INTRAVENOUS; SUBCUTANEOUS at 09:02

## 2023-02-07 RX ADMIN — METHOCARBAMOL 1000 MG: 500 TABLET ORAL at 01:02

## 2023-02-07 RX ADMIN — OXYCODONE HYDROCHLORIDE 10 MG: 10 TABLET ORAL at 05:02

## 2023-02-07 RX ADMIN — MEROPENEM 2 G: 1 INJECTION INTRAVENOUS at 01:02

## 2023-02-07 RX ADMIN — MEROPENEM 2 G: 1 INJECTION INTRAVENOUS at 10:02

## 2023-02-07 RX ADMIN — FUROSEMIDE 40 MG: 40 TABLET ORAL at 05:02

## 2023-02-07 RX ADMIN — Medication 10 ML: at 01:02

## 2023-02-07 RX ADMIN — DIAZEPAM 5 MG: 5 TABLET ORAL at 09:02

## 2023-02-07 RX ADMIN — FUROSEMIDE 40 MG: 40 TABLET ORAL at 09:02

## 2023-02-07 RX ADMIN — HEPARIN SODIUM 5000 UNITS: 5000 INJECTION INTRAVENOUS; SUBCUTANEOUS at 01:02

## 2023-02-07 RX ADMIN — ACETAMINOPHEN 1000 MG: 500 TABLET ORAL at 09:02

## 2023-02-07 RX ADMIN — SENNOSIDES AND DOCUSATE SODIUM 1 TABLET: 50; 8.6 TABLET ORAL at 09:02

## 2023-02-07 RX ADMIN — GABAPENTIN 600 MG: 300 CAPSULE ORAL at 09:02

## 2023-02-07 RX ADMIN — LACTULOSE 20 G: 20 SOLUTION ORAL at 08:02

## 2023-02-07 RX ADMIN — Medication: at 07:02

## 2023-02-07 RX ADMIN — BUPROPION HYDROCHLORIDE 300 MG: 300 TABLET, FILM COATED, EXTENDED RELEASE ORAL at 09:02

## 2023-02-07 RX ADMIN — LACTULOSE 20 G: 20 SOLUTION ORAL at 03:02

## 2023-02-07 RX ADMIN — METHOCARBAMOL 1000 MG: 500 TABLET ORAL at 09:02

## 2023-02-07 RX ADMIN — METHOCARBAMOL 1000 MG: 500 TABLET ORAL at 08:02

## 2023-02-07 RX ADMIN — METHOCARBAMOL 1000 MG: 500 TABLET ORAL at 05:02

## 2023-02-07 RX ADMIN — SPIRONOLACTONE 25 MG: 25 TABLET, FILM COATED ORAL at 09:02

## 2023-02-07 RX ADMIN — GABAPENTIN 600 MG: 300 CAPSULE ORAL at 08:02

## 2023-02-07 RX ADMIN — Medication 10 ML: at 06:02

## 2023-02-07 RX ADMIN — MEROPENEM 2 G: 1 INJECTION INTRAVENOUS at 06:02

## 2023-02-07 RX ADMIN — PANTOPRAZOLE SODIUM 40 MG: 40 TABLET, DELAYED RELEASE ORAL at 09:02

## 2023-02-07 RX ADMIN — ACETAMINOPHEN 1000 MG: 500 TABLET ORAL at 03:02

## 2023-02-07 NOTE — PROGRESS NOTES
[facility] Neuro Critical Care  Neurocritical Care  Progress Note    Admit Date: 1/19/2023  Service Date: 02/07/2023  Length of Stay: 19    Subjective:     Chief Complaint: Weakness of both lower extremities    History of Present Illness: 42-year-old female with a history of IV drug use, chronic hep C with cirrhosis, spinal fusion in April 2021, complicated by epidural abscess with removal of hardware February 2022 and against spinal fusion in March 2022 of T10 through the pelvis as well as neurogenic bladder who initially presented to [facility] in late December for generalized weakness, fatigue and bilateral flank pain.  Her course at [facility] was complicated by a E coli bacteremia thought to be due to a urinary source, she is completed a 7 day course of meropenem on 1/3, and had bcx from 1/2 that did not grow any bacteria.       She also had an ICU admission for ongoing back spasms and agitation requiring Precedex.  In early January patient started developing progressive lower extremity weakness greater in the left compared to the right.  She also had an MRI at the outside hospital that showed some white matter changes concerning for possible demyelinating disease.  However her thoracic spine radiographs also showed worsening of her kyphosis at T9-T10.  Due to the concern for either a demyelinating polyneuropathy, MS, or spinal cord compression the patient was transferred to [facility] for neurosurgical evaluation.       On admission the patient had a CT lumbar and thoracic spine and an MRI CTL spine. The MRI showed  focal kyphosis T8 through T10, with possible associated cord edema signal.  As well as extensive edema throughout the posterior paraspinal musculature concerning for possible infectious or inflammatory process.  There was concern that the findings at T8 through T10 could correspond with compressive myelopathy.  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  There were also erosive changes involving the sacroiliac joints bilaterally which was concerning for sacroiliitis and could be infectious.     Patient underwent laminectomy T8-T10; posterior fusion T8-T12; and washout by NSGY today. No complications noted during surgery. Patient is HDS and on 3L NC. Patient is currently on a PCA dilaudid pump.            Hospital Course: 1/25/2023 NAEO, pain well controlled on current regimen. Continue ABX. Maintain logroll precautions and HOB <30 until second stage of surgery.  01/26/2023 Hemoglobin 6.6, repeat 6.2. Increased drainage noted from bilateral hemovac. S/p 1u pRBC. PICC team consulted for long term access for antibiotics, remove IJ central line when PICC placed.   01/27/2023: NAEON. HV to gravity with decreased drainge. Hgb 7.2 this AM, will trend CBC q12h. Lytes replaced. Bowel regimen adjusted.   01/28/2023: NAEON. Received 1 unit PBRC yesterday. Hgb 7.4 this AM. Plan for OR 2/2.   01/29/2023: NAEON. HV drainage decreasing. Hgb stable. Plan for OR 2/2  01/30/2023 Stable exam. Significant solid BM's  01/31/2023 Exam stable, patient in much better spirits today  02/01/2023 Exam stable  02/02/2023 Post-operatively heavily sedated. Oral airway in place  02/03/2023 Doing very well post operatively. Advance care as appropriate  2/4/2023: d/c gu cath, continue MAP goal until Sunday per NSGY team, new gtt available  2/5/2023: completed MAP goals this evening.  02/06/2023 received 1U PRBCs 6.4 with appropriate increase in Hb. Fibrinogen 159, repeat 165.  02/07/2023 Increased Lasix. Pending stepdown to       Interval History:  No acute events overnight. Patient with no new complaints today.    Review of Systems   Constitutional:  Negative for chills and fever.   HENT:  Negative for congestion and rhinorrhea.    Eyes:  Negative for pain and redness.   Respiratory:  Positive for shortness of breath.  Negative for cough.    Cardiovascular:  Positive for leg swelling. Negative for chest pain.   Gastrointestinal:  Positive for abdominal distention. Negative for abdominal pain, diarrhea and nausea.   Genitourinary:  Negative for dysuria and hematuria.   Musculoskeletal:  Positive for back pain. Negative for myalgias.   Neurological:  Positive for weakness. Negative for headaches.   Psychiatric/Behavioral:  Negative for agitation and confusion.      Objective:     Vitals:  Temp: 98.4 °F (36.9 °C)  Pulse: (!) 115  Rhythm: sinus tachycardia  BP: (!) 122/59  MAP (mmHg): 77  Resp: 15  SpO2: 99 %    Temp  Min: 98 °F (36.7 °C)  Max: 98.4 °F (36.9 °C)  Pulse  Min: 115  Max: 129  BP  Min: 93/59  Max: 124/62  MAP (mmHg)  Min: 65  Max: 91  Resp  Min: 13  Max: 25  SpO2  Min: 94 %  Max: 99 %    02/06 0701 - 02/07 0700  In: 1474.2 [P.O.:400; I.V.:421.1]  Out: 2130 [Urine:1000; Drains:1130]   Unmeasured Output  Urine Occurrence: 1  Stool Occurrence: 1  Pad Count: 5       Physical Exam  Vitals and nursing note reviewed.   Constitutional:       General: She is not in acute distress.  HENT:      Head: Normocephalic and atraumatic.      Nose: Nose normal. No rhinorrhea.      Mouth/Throat:      Mouth: Mucous membranes are moist.      Pharynx: Oropharynx is clear.   Eyes:      General: No scleral icterus.        Right eye: No discharge.         Left eye: No discharge.      Pupils: Pupils are equal, round, and reactive to light.   Cardiovascular:      Rate and Rhythm: Regular rhythm. Tachycardia present.   Pulmonary:      Effort: Pulmonary effort is normal. No respiratory distress.   Abdominal:      General: There is distension.      Tenderness: There is no abdominal tenderness.   Musculoskeletal:      Right lower leg: Edema present.      Left lower leg: Edema present.   Skin:     General: Skin is warm and dry.      Capillary Refill: Capillary refill takes less than 2 seconds.   Neurological:      Mental Status: She is alert and oriented  to person, place, and time.      Comments: BUE 5/5 BLE 2/5       Medications:  Continuous  hydromorphone in 0.9 % NaCl 6 mg/30 ml    Scheduled  acetaminophen, 1,000 mg, TID  [START ON 2/8/2023] bisacodyL, 10 mg, Every other day  buPROPion, 300 mg, Daily  furosemide, 40 mg, BID  gabapentin, 600 mg, TID  heparin (porcine), 5,000 Units, Q8H  lactulose, 20 g, TID  meropenem (MERREM) IVPB, 2 g, Q8H  methocarbamoL, 1,000 mg, QID  pantoprazole, 40 mg, Daily  sodium chloride 0.9%, 10 mL, Q6H  spironolactone, 25 mg, Daily    PRN  sodium chloride, , Q24H PRN  sodium chloride, , Q24H PRN  dextrose 10%, 12.5 g, PRN  dextrose 10%, 25 g, PRN  diazePAM, 5 mg, Q6H PRN  glucagon (human recombinant), 1 mg, PRN  glucose, 16 g, PRN  glucose, 24 g, PRN  hydrALAZINE, 10 mg, Q6H PRN  labetalol, 10 mg, Q4H PRN  magnesium hydroxide 400 mg/5 ml, 30 mL, Daily PRN  magnesium oxide, 800 mg, PRN  magnesium oxide, 800 mg, PRN  melatonin, 6 mg, Nightly PRN  naloxone, 0.02 mg, PRN  ondansetron, 4 mg, Q6H PRN  oxyCODONE, 10 mg, Q4H PRN  polyethylene glycol, 17 g, Daily PRN  potassium bicarbonate, 35 mEq, PRN  potassium bicarbonate, 50 mEq, PRN  potassium bicarbonate, 60 mEq, PRN  potassium, sodium phosphates, 2 packet, PRN  potassium, sodium phosphates, 2 packet, PRN  potassium, sodium phosphates, 2 packet, PRN  prochlorperazine, 2.5 mg, Q6H PRN  senna-docusate 8.6-50 mg, 1 tablet, BID PRN  sodium chloride 0.9%, 10 mL, Q12H PRN  sodium chloride 0.9%, 10 mL, PRN      Today I personally reviewed pertinent medications, lines/drains/airways, imaging, cardiology results, laboratory results, microbiology results, notably:  Na 132  Hb 7.7    Diet  Diet Adult Regular (IDDSI Level 7)      Assessment/Plan:     Psychiatric  Anxiety and depression  Evaluated by Psychiatry at OSH prior to transfer. Recommended increasing bupropion.  - continue bupropion 300 mg QD  - gabapentin 600 mg TID for anxiety and neuropathy  - PRN diazepam 5 mg q6h    Substance use  disorder  Denies IV drug use (meth) for past 3 years. Denies alcohol and tobacco abuse.       ID  Thoracic discitis  See Weakness of both lower extremities    Oncology  Acute blood loss anemia  Hb 6.4 on 2/6, received 1U pRBCs. Hb 2/7 WNL.  - monitor Hb  - transfuse to maintain Hb >7    GI  Cirrhosis of liver with ascites  Patient has reportedly refused transplant evaluation in the past.   - daily CMP and trend LFTs  - continue Lactulose 20 g TID  - Lasix 40 mg PO BID plus 40 mg IV 1x today   - will need Hepatology follow up outpatient    MELD-Na score: 17 at 2/7/2023  3:57 AM  MELD score: 12 at 2/7/2023  3:57 AM  Calculated from:  Serum Creatinine: 0.5 mg/dL (Using min of 1 mg/dL) at 2/7/2023  3:57 AM  Serum Sodium: 132 mmol/L at 2/7/2023  3:57 AM  Total Bilirubin: 1.9 mg/dL at 2/7/2023  3:57 AM  INR(ratio): 1.3 at 2/6/2023 10:47 AM  Age: 42 years    Orthopedic  * Weakness of both lower extremities  42 year old woman s/p multiple spinal surgeries presented to OSH with possible infection of shoulder. Found to have UTI and E. Coli bacteremia and progressively worse BLE weakness. Transferred to [facility] for neurosurgical evaluation. Underwent Laminectomy T8-T10; Posterior spinal fusion T8-T12; hardware removal and washout on 1/24. Admitted to [facility] postop. On 2/2 underwent T4-pelvis fusion and T9-T10 corpectomies. To complete 8 week course of meropenem per ID for ESBL E coli from bone culture, end date 3/29.    - neuro checks q2hr daytime and q4hr evening, vitals q1hr, I/Os  - HV drains x 2 in place, management per NSGY  - meropenem for ESBL bacteremia per ID  - Transfuse for Hb <7  - CMP, Mag, Phos, CBC daily  - MAP goals >65, SBP < 180  - PRN labetalol, hydralazine  - MM pain regimen: PCA Dilaudid pump, Tylenol, gabapentin, robaxin, PRN oxycodone  - Aggressive bowel regimen  - PT/OT           The patient is being Prophylaxed for:  Venous Thromboembolism with: Chemical  Stress Ulcer with: Not Applicable   Ventilator  Pneumonia with: not applicable    Activity Orders          Diet Adult Regular (IDDSI Level 7): Regular starting at 02/03 1024    Up in chair With meals starting at 02/02 1700    Progressive Mobility Protocol (mobilize patient to their highest level of functioning at least twice daily) starting at 02/02 1356    Elevate HOB 30 starting at 02/02 1356    Ambulate With Assistance, Post Op Day 0 If the patient arrives from PACU by 4PM, walk at least once on day of operation if alert and safe to do so. starting at 02/02 1355    Turn patient starting at 01/24 1800    Up in chair With meals starting at 01/24 1700    Progressive Mobility Protocol (mobilize patient to their highest level of functioning at least twice daily) starting at 01/24 1105    Elevate HOB 30 starting at 01/24 1105    Ambulate With Assistance, Post Op Day 0 If the patient arrives from PACU by 4PM, walk at least once on day of operation if alert and safe to do so. starting at 01/24 1104        Partial Code    Rachel Marlow MD  Neurocritical Care  [facility] - Neuro Critical Care

## 2023-02-07 NOTE — SUBJECTIVE & OBJECTIVE
Interval History: 2/7: NAEON, AFVSS, Exam stable, more BMs yesterday w/ improving abdominal discomfort, continuing to wean PCA today, stable for TTF today, H/H 7/24, rec 1U pRBCs for goal >8/24    Medications:  Continuous Infusions:   hydromorphone in 0.9 % NaCl 6 mg/30 ml       Scheduled Meds:   acetaminophen  1,000 mg Oral TID    [START ON 2/8/2023] bisacodyL  10 mg Rectal Every other day    buPROPion  300 mg Oral Daily    furosemide  40 mg Oral BID    gabapentin  600 mg Oral TID    heparin (porcine)  5,000 Units Subcutaneous Q8H    lactulose  20 g Oral TID    meropenem (MERREM) IVPB  2 g Intravenous Q8H    methocarbamoL  1,000 mg Oral QID    pantoprazole  40 mg Oral Daily    senna-docusate 8.6-50 mg  1 tablet Oral BID    sodium chloride 0.9%  10 mL Intravenous Q6H    spironolactone  25 mg Oral Daily     PRN Meds:sodium chloride, sodium chloride, dextrose 10%, dextrose 10%, diazePAM, glucagon (human recombinant), glucose, glucose, hydrALAZINE, labetalol, magnesium hydroxide 400 mg/5 ml, magnesium oxide, magnesium oxide, melatonin, naloxone, ondansetron, oxyCODONE, polyethylene glycol, potassium bicarbonate, potassium bicarbonate, potassium bicarbonate, potassium, sodium phosphates, potassium, sodium phosphates, potassium, sodium phosphates, prochlorperazine, sodium chloride 0.9%, Flushing PICC Protocol **AND** sodium chloride 0.9% **AND** sodium chloride 0.9%     Review of Systems  Objective:     Weight: 132 kg (291 lb 0.1 oz)  Body mass index is 45.58 kg/m².  Vital Signs (Most Recent):  Temp: 98.4 °F (36.9 °C) (02/07/23 0305)  Pulse: (!) 115 (02/07/23 0600)  Resp: 15 (02/07/23 0735)  BP: 99/66 (02/07/23 0600)  SpO2: 99 % (02/07/23 0600) Vital Signs (24h Range):  Temp:  [98 °F (36.7 °C)-98.6 °F (37 °C)] 98.4 °F (36.9 °C)  Pulse:  [115-129] 115  Resp:  [13-27] 15  SpO2:  [94 %-99 %] 99 %  BP: ()/(45-85) 99/66       Neurosurgery Physical Exam  General: well developed, well nourished, no distress.   Head:  normocephalic, atraumatic  Neurologic: Alert and oriented. Thought content appropriate.  GCS: Motor: 6/Verbal: 5/Eyes: 4 GCS Total: 15  Mental Status: Awake, Alert, Oriented x 4  Language: No aphasia  Speech: No dysarthria  Cranial nerves: face symmetric, tongue midline, CN II-XII grossly intact.   Eyes: pupils equal, round, reactive to light with accommodation, EOMI, nystagmus.   Pulmonary: normal respirations, no signs of respiratory distress  Abdomen: soft, non-distended, not tender to palpation  Skin: Skin is warm, dry and intact.  Sensory: intact to light touch throughout     Motor Strength:Moves all extremities spontaneously with good tone.  Full strength upper and extremities. No abnormal movements seen.      Strength   Deltoids Triceps Biceps Wrist Extension Wrist Flexion Hand    Upper: R 5/5 5/5 5/5 5/5 5/5 5/5     L 5/5 5/5 5/5 5/5 5/5 5/5       Iliopsoas Quadriceps Knee  Flexion Tibialis  anterior Gastro- cnemius EHL   Lower: R 3/5 4/5 4/5 4+/5 4+/5 4+/5     L 3/5 4/5 4/5 4+/5 4+/5 4+/5      Some pain and habitus limitation     Reflexes:   DTR: 2+ symmetrically throughout.  Parker's: Negative.  Babinski's: Present bilaterally  Clonus: 2 beats bilateral lower extremity     Incision well healed       Significant Labs:  Recent Labs   Lab 02/06/23  0157 02/07/23  0357   * 119*   * 132*   K 4.2 4.0    103   CO2 24 23   BUN 19 18   CREATININE 0.4* 0.5   CALCIUM 7.6* 7.6*   MG 1.9 1.8       Recent Labs   Lab 02/06/23  1047 02/06/23  1916 02/07/23  0357   WBC 4.71 6.05 6.11   HGB 6.4* 7.7* 7.6*   HCT 21.4* 25.0* 24.8*   PLT 93* 123* 121*       Recent Labs   Lab 02/06/23  1047   INR 1.3*   APTT 36.6*       Microbiology Results (last 7 days)       Procedure Component Value Units Date/Time    Fungus culture [[id number]] Collected: 01/24/23 0913    Order Status: Completed Specimen: Body Fluid from Back Updated: 02/02/23 0954     Fungus (Mycology) Culture Culture in progress    Narrative:       2) Perispinal    Fungus culture [[id number]] Collected: 01/24/23 0913    Order Status: Completed Specimen: Body Fluid from Back Updated: 02/02/23 0954     Fungus (Mycology) Culture Culture in progress    Narrative:      1) Perispinal    Fungus culture [[id number]] Collected: 01/24/23 0943    Order Status: Completed Specimen: Bone from Back Updated: 02/02/23 0954     Fungus (Mycology) Culture Culture in progress    Narrative:      3) Perispinal    Fungus culture [[id number]] Collected: 01/24/23 0943    Order Status: Completed Specimen: Bone from Back Updated: 02/02/23 0954     Fungus (Mycology) Culture Culture in progress    Narrative:      4) Perispinal    Blood culture [[id number]] Collected: 01/27/23 0120    Order Status: Completed Specimen: Blood from Peripheral, Forearm, Right Updated: 02/01/23 0612     Blood Culture, Routine No growth after 5 days.    Blood culture [[id number]] Collected: 01/27/23 0123    Order Status: Completed Specimen: Blood from Peripheral, Antecubital, Left Updated: 02/01/23 0612     Blood Culture, Routine No growth after 5 days.          All pertinent labs from the last 24 hours have been reviewed.    Significant Diagnostics:  All significant diagnostics reviewed

## 2023-02-07 NOTE — PROGRESS NOTES
"[facility] - Neuro Critical Care  Adult Nutrition  Progress Note    SUMMARY       Recommendations    1. Continue current Regular diet + ONS.   2. RD to monitor & follow-up.    Goals: Continue meeting % EEN/EPN by next RD f/u.  Nutrition Goal Status: progressing towards goal  Communication of RD Recs:  (POC)    Assessment and Plan    No nutritional dx at this time.    Reason for Assessment    Reason For Assessment: RD follow-up  Diagnosis:  (Weakness of BLEs)  Relevant Medical History: Cirrhosis of liver with ascites, hep C, substance use disorder (IV drug), spinal fusion (4/21)  Interdisciplinary Rounds: did not attend    General Information Comments: Per RN documentation, pt tolerating diet + ONS w/ fair PO intake. S/p laminectomy. At initial RD assessment, pt reported good appetite PTA & UBW of 280#. Appears nourished w/ no indicators of malnutrition.   Nutrition Discharge Planning: Regular diet    Nutrition/Diet History    Food Preferences: Less chicken and more salads, vanilla Boost only  Spiritual, Cultural Beliefs, Yazidism Practices, Values that Affect Care: no  Food Allergies: shellfish, other (see comments) (Nuts)  Factors Affecting Nutritional Intake: decreased appetite    Anthropometrics    Temp: 97.7 °F (36.5 °C)  Height Method: Stated  Height: 5' 7" (170.2 cm)  Height (inches): 67 in  Weight Method: Bed Scale  Weight: 132 kg (291 lb 0.1 oz)  Weight (lb): 291.01 lb  Ideal Body Weight (IBW), Female: 135 lb  % Ideal Body Weight, Female (lb): 215.56 %  BMI (Calculated): 45.6  BMI Grade: greater than 40 - morbid obesity    Lab/Procedures/Meds    Pertinent Labs Reviewed: reviewed  Pertinent Labs Comments: Na 132  Pertinent Medications Reviewed: reviewed  Pertinent Medications Comments: Lactulose    Estimated/Assessed Needs    Weight Used For Calorie Calculations: 132 kg (291 lb 0.1 oz)    Energy Calorie Requirements (kcal): 2013 kcal/d  Energy Need Method: [place]-St Jeor (1.0 PAL)    Protein " Requirements: 106 g/d (.8 g/kg)  Weight Used For Protein Calculations: 132 kg (291 lb 0.1 oz)    Estimated Fluid Requirement Method: RDA Method  RDA Method (mL): 2013    Nutrition Prescription Ordered    Current Diet Order: Regular  Oral Nutrition Supplement: Boost Plus TID    Evaluation of Received Nutrient/Fluid Intake    I/O: +7.9L since 1/24    Comments: LBM: 2/6    Tolerance: tolerating    Nutrition Risk    Level of Risk/Frequency of Follow-up:  (1 time/week)     Monitor and Evaluation    Food and Nutrient Intake: energy intake, food and beverage intake  Food and Nutrient Adminstration: diet order  Knowledge/Beliefs/Attitudes: food and nutrition knowledge/skill, beliefs and attitudes  Physical Activity and Function: nutrition-related ADLs and IADLs  Anthropometric Measurements: weight, height/length, weight change, body mass index  Biochemical Data, Medical Tests and Procedures: gastrointestinal profile, electrolyte and renal panel, glucose/endocrine profile, lipid profile, inflammatory profile  Nutrition-Focused Physical Findings: overall appearance     Nutrition Follow-Up    RD Follow-up?: Yes

## 2023-02-07 NOTE — ASSESSMENT & PLAN NOTE
Patient has reportedly refused transplant evaluation in the past.   - daily CMP and trend LFTs  - continue Lactulose 20 g TID  - Lasix 40 mg PO BID plus 40 mg IV 1x today   - will need Hepatology follow up outpatient    MELD-Na score: 17 at 2/7/2023  3:57 AM  MELD score: 12 at 2/7/2023  3:57 AM  Calculated from:  Serum Creatinine: 0.5 mg/dL (Using min of 1 mg/dL) at 2/7/2023  3:57 AM  Serum Sodium: 132 mmol/L at 2/7/2023  3:57 AM  Total Bilirubin: 1.9 mg/dL at 2/7/2023  3:57 AM  INR(ratio): 1.3 at 2/6/2023 10:47 AM  Age: 42 years

## 2023-02-07 NOTE — PLAN OF CARE
ALICIA completed the LOCET via phone. ALICIA faxed [facility] to obtain the 142 for NH admission.      SHAHBAZ TaylorN - [facility]  [phone]

## 2023-02-07 NOTE — PLAN OF CARE
[facility] - Neuro Critical Care  Discharge Reassessment    Primary Care Provider: Dannielle Merino DO    Expected Discharge Date: 2/8/2023      Per MD, patient to step down to the Medicine Service today pending a bed.  Therapy is recommending SNF, but patient will need IV Abx.  [facility] has accepted and is following.     Per MD:  ) Discharge Antibiotics:     Intravenous antibiotics:  Meropenem 2 g IV q 8 hours         3) Therapy Duration:  8 weeks     Estimated end date of IV antibiotics: 03/29/2023         Reassessment (most recent)       Discharge Reassessment - 02/07/23 1529          Discharge Reassessment    Assessment Type Discharge Planning Reassessment     Did the patient's condition or plan change since previous assessment? No     Communicated SHIRA with patient/caregiver Date not available/Unable to determine     Discharge Plan A Long-term acute care facility (LTAC)     Discharge Plan B Skilled Nursing Facility     DME Needed Upon Discharge  none     Discharge Barriers Identified Underinsured     Why the patient remains in the hospital Requires continued medical care                     Ernestina Elizabeth RN, CCRN-K, [facility]  Neuro-Critical Care   [phone]

## 2023-02-07 NOTE — RESIDENT HANDOFF
Neuro Critical Care Transfer of Care note    Date of Admit: 1/19/2023  Date of Transfer / Stepdown: 2/7/2023    Brief History of Present Illness:      Rachel Zazueta is a 42 y.o. female who  has a past medical history of Anemia, Anxiety, Depressed, Diskitis, Hep C w/o coma, chronic, IV drug abuse, Kidney stone, and Liver cirrhosis.. The patient presented to [facility] on 1/19/2023 with a primary complaint of lower extremity weakness    Found to have T8-T10 kyphosis on MRI. 1/24 underwent laminectomy T8-T10; posterior fusion T8-T12; and washout. 2/2 underwent T4-pelvis fusion and T9-T10 corpectomy.    Hospital Course By Problem with Pertinent Findings:     1. Thoracic discitis  - continue meropenem until 3/29/23 per ID    2. S/p T4-pelvis fusion, T9-T10 corpectomy  - multimodal pain management: dilaudid PCA pump, robaxin, gabapentin, Tylenol  - postop thoracolumbar XR ordered  - PT/OT recommending SNF    3. HCV Cirrhosis  With ascites and hyponatremia  - spironolactone 25 mg QD  - lasix PO 40 mg BID  - lasix IV 40 mg once today    4. Anemia  Hb 6.4 2/6, received 1U PRBCs, repeat Hb 7.7.    5. Anxiety  - bupropion 300 mg QD      Consultants and Procedures:     Consultants:  Neurosurgery, ID    Procedures:    1/24: laminectomy T8-T10, posterior fusion T8-T12, and washout  2/2: T4-pelvis fusion and T9-T10 corpectomy    Transfer Information:     Diet:  Regular diet    To Do / Pending Studies / Follow ups:  - f/u postop XR  - adjust diuresis  - adjust pain regimen    Rachel Marlow  Neuro Critical Care

## 2023-02-07 NOTE — ASSESSMENT & PLAN NOTE
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 Transferred to [facility] to  and neurosurgery was consulted    OR 1/24 for temporary T6-12 PSIF. Taz pus noted intraoperatively.   OR 2/2 for T4-pelvis revision and extension of fusion with T9-10 corpectomy with plastic surgery assistance       Plan:  --Admitted to ICU   -q1h neurochecks   -Stable from NSGY perspective for TTF to  once cleared per NCC  --All labs & diagnostics reviewed   -MRI C/T/L spine 1/20 with C7 SP enhancement, focal kyphosis at T8-10 with severe anterior height loss at T9, enhancing C6 inferior endplate Schmorl's node   -CT T/L spine thin cut 1/20 with haloing of pelvic and T10 screws, spondylodiscitis T8-9, T9-10   -CT C spine with concern for discitis at C6-7   -flex ext XR done, limited view of C6-7 in swimmers view 2/2 shoulders   -post op xrays pending   --TLSO at bedside; to be worn @ all times when OOB  --MIHIR drains x4, monitor output  --Plastic surgery managing incision   --ID: [email or web address]   -- BCx 1/20: NGTD   -- OR cultures 1/24: E. coli   -- multimodal pain control per primary medicine team  --H/H goal >8/24, Plt goal >70k; transfuse PRN for goal  --Maps > 85 complete 2/5  --PT/OT/OOB, pain control  --ADAT per primary  --SQH/TEDs/SCDs  --NSGY will continue to follow. Please contact team with any further questions.

## 2023-02-07 NOTE — SUBJECTIVE & OBJECTIVE
Interval History:  No acute events overnight. Patient with no new complaints today.    Review of Systems   Constitutional:  Negative for chills and fever.   HENT:  Negative for congestion and rhinorrhea.    Eyes:  Negative for pain and redness.   Respiratory:  Positive for shortness of breath. Negative for cough.    Cardiovascular:  Positive for leg swelling. Negative for chest pain.   Gastrointestinal:  Positive for abdominal distention. Negative for abdominal pain, diarrhea and nausea.   Genitourinary:  Negative for dysuria and hematuria.   Musculoskeletal:  Positive for back pain. Negative for myalgias.   Neurological:  Positive for weakness. Negative for headaches.   Psychiatric/Behavioral:  Negative for agitation and confusion.      Objective:     Vitals:  Temp: 98.4 °F (36.9 °C)  Pulse: (!) 115  Rhythm: sinus tachycardia  BP: (!) 122/59  MAP (mmHg): 77  Resp: 15  SpO2: 99 %    Temp  Min: 98 °F (36.7 °C)  Max: 98.4 °F (36.9 °C)  Pulse  Min: 115  Max: 129  BP  Min: 93/59  Max: 124/62  MAP (mmHg)  Min: 65  Max: 91  Resp  Min: 13  Max: 25  SpO2  Min: 94 %  Max: 99 %    02/06 0701 - 02/07 0700  In: 1474.2 [P.O.:400; I.V.:421.1]  Out: 2130 [Urine:1000; Drains:1130]   Unmeasured Output  Urine Occurrence: 1  Stool Occurrence: 1  Pad Count: 5       Physical Exam  Vitals and nursing note reviewed.   Constitutional:       General: She is not in acute distress.  HENT:      Head: Normocephalic and atraumatic.      Nose: Nose normal. No rhinorrhea.      Mouth/Throat:      Mouth: Mucous membranes are moist.      Pharynx: Oropharynx is clear.   Eyes:      General: No scleral icterus.        Right eye: No discharge.         Left eye: No discharge.      Pupils: Pupils are equal, round, and reactive to light.   Cardiovascular:      Rate and Rhythm: Regular rhythm. Tachycardia present.   Pulmonary:      Effort: Pulmonary effort is normal. No respiratory distress.   Abdominal:      General: There is distension.      Tenderness:  There is no abdominal tenderness.   Musculoskeletal:      Right lower leg: Edema present.      Left lower leg: Edema present.   Skin:     General: Skin is warm and dry.      Capillary Refill: Capillary refill takes less than 2 seconds.   Neurological:      Mental Status: She is alert and oriented to person, place, and time.      Comments: BUE 5/5 BLE 2/5       Medications:  Continuous  hydromorphone in 0.9 % NaCl 6 mg/30 ml    Scheduled  acetaminophen, 1,000 mg, TID  [START ON 2/8/2023] bisacodyL, 10 mg, Every other day  buPROPion, 300 mg, Daily  furosemide, 40 mg, BID  gabapentin, 600 mg, TID  heparin (porcine), 5,000 Units, Q8H  lactulose, 20 g, TID  meropenem (MERREM) IVPB, 2 g, Q8H  methocarbamoL, 1,000 mg, QID  pantoprazole, 40 mg, Daily  sodium chloride 0.9%, 10 mL, Q6H  spironolactone, 25 mg, Daily    PRN  sodium chloride, , Q24H PRN  sodium chloride, , Q24H PRN  dextrose 10%, 12.5 g, PRN  dextrose 10%, 25 g, PRN  diazePAM, 5 mg, Q6H PRN  glucagon (human recombinant), 1 mg, PRN  glucose, 16 g, PRN  glucose, 24 g, PRN  hydrALAZINE, 10 mg, Q6H PRN  labetalol, 10 mg, Q4H PRN  magnesium hydroxide 400 mg/5 ml, 30 mL, Daily PRN  magnesium oxide, 800 mg, PRN  magnesium oxide, 800 mg, PRN  melatonin, 6 mg, Nightly PRN  naloxone, 0.02 mg, PRN  ondansetron, 4 mg, Q6H PRN  oxyCODONE, 10 mg, Q4H PRN  polyethylene glycol, 17 g, Daily PRN  potassium bicarbonate, 35 mEq, PRN  potassium bicarbonate, 50 mEq, PRN  potassium bicarbonate, 60 mEq, PRN  potassium, sodium phosphates, 2 packet, PRN  potassium, sodium phosphates, 2 packet, PRN  potassium, sodium phosphates, 2 packet, PRN  prochlorperazine, 2.5 mg, Q6H PRN  senna-docusate 8.6-50 mg, 1 tablet, BID PRN  sodium chloride 0.9%, 10 mL, Q12H PRN  sodium chloride 0.9%, 10 mL, PRN      Today I personally reviewed pertinent medications, lines/drains/airways, imaging, cardiology results, laboratory results, microbiology results, notably:  Na 132  Hb 7.7    Diet  Diet Adult  Regular (IDDSI Level 7)

## 2023-02-07 NOTE — PT/OT/SLP PROGRESS
Physical Therapy Treatment    Patient Name:  Rachel Zazueta   MRN:  [id number]    Recommendations:     Discharge Recommendations: nursing facility, skilled  Discharge Equipment Recommendations: hospital bed, lift device  Barriers to discharge: Inaccessible home and Decreased caregiver support    Assessment:     Rachel Zazueta is a 42 y.o. female admitted with a medical diagnosis of Weakness of both lower extremities.  She presents with the following impairments/functional limitations: weakness, impaired endurance, impaired self care skills, impaired functional mobility, impaired balance, decreased upper extremity function, decreased lower extremity function, decreased safety awareness, pain, decreased ROM, impaired skin, edema, impaired cardiopulmonary response to activity, orthopedic precautions.    Pt tolerates session fairly with focus on bed mobility and EOB balance/endurance. Pt pain limited and continuing to require significant assistance of 2 persons, but pt able to sit up at EOB for ~15 minutes. Unable to don TLSO once upright d/t significant assistance needed to maintain safe sitting balance and pt c/o abdominal pain. Pt motivated and fully participatory throughout session to her tolerance. Returned to supine d/t fatigue and pain. Pt will continue to benefit from therapy services to address impairments listed above.     Rehab Prognosis: Good; patient would benefit from acute skilled PT services to address these deficits and reach maximum level of function.    Recent Surgery: Procedure(s) (LRB):  LAMINECTOMY, SPINE, THORACIC, WITH FUSION (T4-Pelvis fusion w/ T9-10 corpectomies) **AIRO (N/A) 5 Days Post-Op    Plan:     During this hospitalization, patient to be seen 3 x/week to address the identified rehab impairments via therapeutic activities, therapeutic exercises, neuromuscular re-education, wheelchair management/training and progress toward the following goals:    Plan of Care Expires:   "03/05/23    Subjective     Chief Complaint: pain  Patient/Family Comments/goals: "It's not even my back, that hurts, but my belly hurts so bad when I try to come forward."  Pain/Comfort:  Pain Rating 1: 8/10 (pain worsens at EOB with attempt to sit pt upright at EOB)  Location - Orientation 1: generalized  Location 1: abdomen  Pain Addressed 1: Pre-medicate for activity, Reposition, Distraction, Nurse notified, Cessation of Activity, Active PCA in use  Pain Rating Post-Intervention 1: 8/10 (stable pain in supine)      Objective:     Communicated with RN prior to session.  Patient found HOB elevated with telemetry, peripheral IV, PureWick, MIHIR drain, PCA, blood pressure cuff, pulse ox (continuous), wound vac upon PTA entry to room.   Rehab technician present and assisting throughout.     General Precautions: Standard, fall, contact  Orthopedic Precautions: spinal precautions  Braces: TLSO - unable to don once up to EOB d/t assistance needed to keep pt safely at EOB   Respiratory Status: Nasal cannula, flow 2 L/min     Functional Mobility:  Bed Mobility:     Rolling Left:  maximal assistance  Rolling Right: maximal assistance  Supine to Sit: total assistance and of 2 persons  Sit to Supine: total assistance and of 2 persons  Transfers:     Sit to Stand:  unable to advance to standing d/t assistance required to maintain safe sitting at EOB and inability to don TLSO      AM-PAC 6 CLICK MOBILITY  Turning over in bed (including adjusting bedclothes, sheets and blankets)?: 2  Sitting down on and standing up from a chair with arms (e.g., wheelchair, bedside commode, etc.): 1  Moving from lying on back to sitting on the side of the bed?: 1  Moving to and from a bed to a chair (including a wheelchair)?: 1  Need to walk in hospital room?: 1  Climbing 3-5 steps with a railing?: 1  Basic Mobility Total Score: 7       Treatment & Education:  Pt assisted to EOB and sits statically with max A of 2 persons for ~15 minutes. Pt unable " to accept additional challenges d/t assistance required to remains seated safely at EOB. Posterior lean d/t abdominal pain with attempts to forward weight shift.   AAROM of BLE as tolerated. Limited range for hip flexion d/t abdominal pain.   Pt assisted with rolling and pericare d/t small bowel incontinence. Pt able to assist with rolling using UE support to bed rails.   Pt educated on spinal precautions and best mobility options to limit pain with supine<>sit transitions.     Patient left HOB elevated with all lines intact, call button in reach, and RN notified.     GOALS:   Multidisciplinary Problems       Physical Therapy Goals          Problem: Physical Therapy    Goal Priority Disciplines Outcome Goal Variances Interventions   Physical Therapy Goal     PT, PT/OT Ongoing, Progressing     Description: Goals to be met by: 23     Patient will increase functional independence with mobility by performin. Supine to sit with Moderate Assistance  2. Sit to supine with Moderate Assistance  3. Rolling to Left and Right with Moderate Assistance.  4. Sit to stand transfer with Maximum Assistance                         Time Tracking:     PT Received On: 23  PT Start Time: 825     PT Stop Time: 904  PT Total Time (min): 39 min     Billable Minutes: Therapeutic Activity 39    Treatment Type: Treatment  PT/PTA: PTA     PTA Visit Number: 1     2023

## 2023-02-07 NOTE — PLAN OF CARE
Recommendations     1. Continue current Regular diet + ONS.   2. RD to monitor & follow-up.     Goals: Continue meeting % EEN/EPN by next RD f/u.  Nutrition Goal Status: progressing towards goal  Communication of RD Recs:  (POC)

## 2023-02-07 NOTE — ASSESSMENT & PLAN NOTE
42 year old woman s/p multiple spinal surgeries presented to OSH with possible infection of shoulder. Found to have UTI and E. Coli bacteremia and progressively worse BLE weakness. Transferred to [facility] [place] for neurosurgical evaluation. Underwent Laminectomy T8-T10; Posterior spinal fusion T8-T12; hardware removal and washout on 1/24. Admitted to [facility] postop. On 2/2 underwent T4-pelvis fusion and T9-T10 corpectomies. To complete 8 week course of meropenem per ID for ESBL E coli from bone culture, end date 3/29.    - neuro checks q2hr daytime and q4hr evening, vitals q1hr, I/Os  - HV drains x 2 in place, management per NSGY  - meropenem for ESBL bacteremia per ID  - Transfuse for Hb <7  - CMP, Mag, Phos, CBC daily  - MAP goals >65, SBP < 180  - PRN labetalol, hydralazine  - MM pain regimen: PCA Dilaudid pump, Tylenol, gabapentin, robaxin, PRN oxycodone  - Aggressive bowel regimen  - PT/OT

## 2023-02-07 NOTE — PROGRESS NOTES
[facility] - [facility] Care  Neurosurgery  Progress Note    Subjective:     History of Present Illness: Ms. Zazueta is a 42 y.o F w/ hx ofIV drug use (methamphetamine), chronic HCV with cirrhosis, spinal fusion (04/2021), epidural abscess with removal of hardware (02/2022), spinal fusion with hardware (T10 - Pelvis / 03/2022), obesity (BMI 39.3) and neurogenic bladder and recent shoulder surgery who initially presented to [facility] for evaluation of back pain and left leg weakness.  She reports initially noting the left leg weakness when she was about to go to the bathroom and was about to fall but was assisted by her boyfriend.  She was brought to the ED via EMS.  Urinalysis with UTI concerns and blood cultures at OSH grew ESBL E coli, and shoulder effusion concern for infection so she was treated with meropenem.  During hospitalization, she reports the weakness that started out in her left leg initially then spread upwards to her left thigh, left hip, then crossed over to the right hip and spread to her right leg.  Denies recent IV drug use. She reports her last incidence of drug use was more than 3 years ago and also denies tobacco, and alcohol abuse.  Reports cannabis use.     OSH course notable for concerning urinalysis and blood cultures positive for ESBL E coli treated with meropenem.  She was also admit to the ICU where she was treated with Precedex for significant body aches, irritability, and muscle spasms.  Concerns of a murmur on physical exam so she underwent TTE which did not show any vegetations.  Worsening lower extremity weakness workup with MRI head nonspecific, MRI cervical spine with multilevel spondylosis, X-ray spine with multilevel thoracolumbar fusion and diskectomy, focal kyphosis at T9-10 increased compared to prior.  She was started on prophylaxis amoxicillin due to her history of infected hardware.  MRI spine unable to be completed due to patient's body habitus so she was  transferred to [facility] [place] for further imaging and Neurosurgery, Neurology evaluation.      Post-Op Info:  Procedure(s) (LRB):  LAMINECTOMY, SPINE, THORACIC, WITH FUSION (T4-Pelvis fusion w/ T9-10 corpectomies) **AIRO (N/A)   5 Days Post-Op     Interval History: 2/7: NAEON, AFVSS, Exam stable, more BMs yesterday w/ improving abdominal discomfort, continuing to wean PCA today, stable for TTF today, H/H 7/24, rec 1U pRBCs for goal >8/24    Medications:  Continuous Infusions:   hydromorphone in 0.9 % NaCl 6 mg/30 ml       Scheduled Meds:   acetaminophen  1,000 mg Oral TID    [START ON 2/8/2023] bisacodyL  10 mg Rectal Every other day    buPROPion  300 mg Oral Daily    furosemide  40 mg Oral BID    gabapentin  600 mg Oral TID    heparin (porcine)  5,000 Units Subcutaneous Q8H    lactulose  20 g Oral TID    meropenem (MERREM) IVPB  2 g Intravenous Q8H    methocarbamoL  1,000 mg Oral QID    pantoprazole  40 mg Oral Daily    senna-docusate 8.6-50 mg  1 tablet Oral BID    sodium chloride 0.9%  10 mL Intravenous Q6H    spironolactone  25 mg Oral Daily     PRN Meds:sodium chloride, sodium chloride, dextrose 10%, dextrose 10%, diazePAM, glucagon (human recombinant), glucose, glucose, hydrALAZINE, labetalol, magnesium hydroxide 400 mg/5 ml, magnesium oxide, magnesium oxide, melatonin, naloxone, ondansetron, oxyCODONE, polyethylene glycol, potassium bicarbonate, potassium bicarbonate, potassium bicarbonate, potassium, sodium phosphates, potassium, sodium phosphates, potassium, sodium phosphates, prochlorperazine, sodium chloride 0.9%, Flushing PICC Protocol **AND** sodium chloride 0.9% **AND** sodium chloride 0.9%     Review of Systems  Objective:     Weight: 132 kg (291 lb 0.1 oz)  Body mass index is 45.58 kg/m².  Vital Signs (Most Recent):  Temp: 98.4 °F (36.9 °C) (02/07/23 0305)  Pulse: (!) 115 (02/07/23 0600)  Resp: 15 (02/07/23 0735)  BP: 99/66 (02/07/23 0600)  SpO2: 99 % (02/07/23 0600) Vital Signs (24h  Range):  Temp:  [98 °F (36.7 °C)-98.6 °F (37 °C)] 98.4 °F (36.9 °C)  Pulse:  [115-129] 115  Resp:  [13-27] 15  SpO2:  [94 %-99 %] 99 %  BP: ()/(45-85) 99/66       Neurosurgery Physical Exam  General: well developed, well nourished, no distress.   Head: normocephalic, atraumatic  Neurologic: Alert and oriented. Thought content appropriate.  GCS: Motor: 6/Verbal: 5/Eyes: 4 GCS Total: 15  Mental Status: Awake, Alert, Oriented x 4  Language: No aphasia  Speech: No dysarthria  Cranial nerves: face symmetric, tongue midline, CN II-XII grossly intact.   Eyes: pupils equal, round, reactive to light with accommodation, EOMI, nystagmus.   Pulmonary: normal respirations, no signs of respiratory distress  Abdomen: soft, non-distended, not tender to palpation  Skin: Skin is warm, dry and intact.  Sensory: intact to light touch throughout     Motor Strength:Moves all extremities spontaneously with good tone.  Full strength upper and extremities. No abnormal movements seen.      Strength   Deltoids Triceps Biceps Wrist Extension Wrist Flexion Hand    Upper: R 5/5 5/5 5/5 5/5 5/5 5/5     L 5/5 5/5 5/5 5/5 5/5 5/5       Iliopsoas Quadriceps Knee  Flexion Tibialis  anterior Gastro- cnemius EHL   Lower: R 3/5 4/5 4/5 4+/5 4+/5 4+/5     L 3/5 4/5 4/5 4+/5 4+/5 4+/5      Some pain and habitus limitation     Reflexes:   DTR: 2+ symmetrically throughout.  Parker's: Negative.  Babinski's: Present bilaterally  Clonus: 2 beats bilateral lower extremity     Incision well healed       Significant Labs:  Recent Labs   Lab 02/06/23  0157 02/07/23  0357   * 119*   * 132*   K 4.2 4.0    103   CO2 24 23   BUN 19 18   CREATININE 0.4* 0.5   CALCIUM 7.6* 7.6*   MG 1.9 1.8       Recent Labs   Lab 02/06/23  1047 02/06/23  1916 02/07/23  0357   WBC 4.71 6.05 6.11   HGB 6.4* 7.7* 7.6*   HCT 21.4* 25.0* 24.8*   PLT 93* 123* 121*       Recent Labs   Lab 02/06/23  1047   INR 1.3*   APTT 36.6*       Microbiology Results (last 7  days)       Procedure Component Value Units Date/Time    Fungus culture [[id number]] Collected: 01/24/23 0913    Order Status: Completed Specimen: Body Fluid from Back Updated: 02/02/23 0954     Fungus (Mycology) Culture Culture in progress    Narrative:      2) Perispinal    Fungus culture [[id number]] Collected: 01/24/23 0913    Order Status: Completed Specimen: Body Fluid from Back Updated: 02/02/23 0954     Fungus (Mycology) Culture Culture in progress    Narrative:      1) Perispinal    Fungus culture [[id number]] Collected: 01/24/23 0943    Order Status: Completed Specimen: Bone from Back Updated: 02/02/23 0954     Fungus (Mycology) Culture Culture in progress    Narrative:      3) Perispinal    Fungus culture [[id number]] Collected: 01/24/23 0943    Order Status: Completed Specimen: Bone from Back Updated: 02/02/23 0954     Fungus (Mycology) Culture Culture in progress    Narrative:      4) Perispinal    Blood culture [[id number]] Collected: 01/27/23 0120    Order Status: Completed Specimen: Blood from Peripheral, Forearm, Right Updated: 02/01/23 0612     Blood Culture, Routine No growth after 5 days.    Blood culture [[id number]] Collected: 01/27/23 0123    Order Status: Completed Specimen: Blood from Peripheral, Antecubital, Left Updated: 02/01/23 0612     Blood Culture, Routine No growth after 5 days.          All pertinent labs from the last 24 hours have been reviewed.    Significant Diagnostics:  All significant diagnostics reviewed    Assessment/Plan:     * Weakness of both lower extremities  Pt is 42F multiple spinal surgeries and revisions last T10- Pelvis in March 2022 who presented to OSH with concern for shoulder infection and UTI found to have E coli sepsis who has had progressive worsening BLE weakness, XR showed possible worsening proximal junctional kyphotic deformity. Transferred to [facility] to  and neurosurgery was consulted    OR 1/24 for temporary T6-12 PSIF. Taz pus noted intraoperatively.   OR  2/2 for T4-pelvis revision and extension of fusion with T9-10 corpectomy with plastic surgery assistance       Plan:  --Admitted to ICU   -q1h neurochecks   -Stable from NSGY perspective for TTF to  once cleared per NCC  --All labs & diagnostics reviewed   -MRI C/T/L spine 1/20 with C7 SP enhancement, focal kyphosis at T8-10 with severe anterior height loss at T9, enhancing C6 inferior endplate Schmorl's node   -CT T/L spine thin cut 1/20 with haloing of pelvic and T10 screws, spondylodiscitis T8-9, T9-10   -CT C spine with concern for discitis at C6-7   -flex ext XR done, limited view of C6-7 in swimmers view 2/2 shoulders   -post op xrays pending   --TLSO at bedside; to be worn @ all times when OOB  --MIHIR drains x4, monitor output  --Plastic surgery managing incision   --ID: [email or web address]   -- BCx 1/20: NGTD   -- OR cultures 1/24: E. coli   -- multimodal pain control per primary medicine team  --H/H goal >8/24, Plt goal >70k; transfuse PRN for goal  --Maps > 85 complete 2/5  --PT/OT/OOB, pain control  --ADAT per primary  --SQH/TEDs/SCDs  --NSGY will continue to follow. Please contact team with any further questions.        Mihai Canada MD  Neurosurgery  [facility] - [facility]

## 2023-02-07 NOTE — PT/OT/SLP PROGRESS
Occupational Therapy      Patient Name:  Rachel Zazueta   MRN:  [id number]    5:17 PM Patient not seen today secondary to  (transferring to floor). Will follow-up as able.    2/7/2023

## 2023-02-08 LAB
ALBUMIN SERPL BCP-MCNC: 1.9 G/DL (ref 3.5–5.2)
ALP SERPL-CCNC: 128 U/L (ref 55–135)
ALT SERPL W/O P-5'-P-CCNC: 31 U/L (ref 10–44)
ANION GAP SERPL CALC-SCNC: 7 MMOL/L (ref 8–16)
AST SERPL-CCNC: 46 U/L (ref 10–40)
BASOPHILS # BLD AUTO: 0.07 K/UL (ref 0–0.2)
BASOPHILS NFR BLD: 1.3 % (ref 0–1.9)
BILIRUB SERPL-MCNC: 1.6 MG/DL (ref 0.1–1)
BUN SERPL-MCNC: 20 MG/DL (ref 6–20)
CALCIUM SERPL-MCNC: 7.4 MG/DL (ref 8.7–10.5)
CHLORIDE SERPL-SCNC: 101 MMOL/L (ref 95–110)
CO2 SERPL-SCNC: 23 MMOL/L (ref 23–29)
CREAT SERPL-MCNC: 0.5 MG/DL (ref 0.5–1.4)
DIFFERENTIAL METHOD: ABNORMAL
EOSINOPHIL # BLD AUTO: 0.3 K/UL (ref 0–0.5)
EOSINOPHIL NFR BLD: 5.8 % (ref 0–8)
ERYTHROCYTE [DISTWIDTH] IN BLOOD BY AUTOMATED COUNT: 21.2 % (ref 11.5–14.5)
EST. GFR  (NO RACE VARIABLE): >60 ML/MIN/1.73 M^2
GLUCOSE SERPL-MCNC: 97 MG/DL (ref 70–110)
HCT VFR BLD AUTO: 25.4 % (ref 37–48.5)
HGB BLD-MCNC: 7.7 G/DL (ref 12–16)
IMM GRANULOCYTES # BLD AUTO: 0.04 K/UL (ref 0–0.04)
IMM GRANULOCYTES NFR BLD AUTO: 0.7 % (ref 0–0.5)
LYMPHOCYTES # BLD AUTO: 0.9 K/UL (ref 1–4.8)
LYMPHOCYTES NFR BLD: 15.3 % (ref 18–48)
MAGNESIUM SERPL-MCNC: 1.9 MG/DL (ref 1.6–2.6)
MCH RBC QN AUTO: 29.2 PG (ref 27–31)
MCHC RBC AUTO-ENTMCNC: 30.3 G/DL (ref 32–36)
MCV RBC AUTO: 96 FL (ref 82–98)
MONOCYTES # BLD AUTO: 0.8 K/UL (ref 0.3–1)
MONOCYTES NFR BLD: 14.3 % (ref 4–15)
NEUTROPHILS # BLD AUTO: 3.5 K/UL (ref 1.8–7.7)
NEUTROPHILS NFR BLD: 62.6 % (ref 38–73)
NRBC BLD-RTO: 0 /100 WBC
PHOSPHATE SERPL-MCNC: 3 MG/DL (ref 2.7–4.5)
PLATELET # BLD AUTO: 122 K/UL (ref 150–450)
PMV BLD AUTO: 9.4 FL (ref 9.2–12.9)
POTASSIUM SERPL-SCNC: 4 MMOL/L (ref 3.5–5.1)
PROT SERPL-MCNC: 4.9 G/DL (ref 6–8.4)
RBC # BLD AUTO: 2.64 M/UL (ref 4–5.4)
SODIUM SERPL-SCNC: 131 MMOL/L (ref 136–145)
TROPONIN I SERPL DL<=0.01 NG/ML-MCNC: <0.006 NG/ML (ref 0–0.03)
WBC # BLD AUTO: 5.54 K/UL (ref 3.9–12.7)

## 2023-02-08 PROCEDURE — 99233 SBSQ HOSP IP/OBS HIGH 50: CPT | Mod: ,,, | Performed by: STUDENT IN AN ORGANIZED HEALTH CARE EDUCATION/TRAINING PROGRAM

## 2023-02-08 PROCEDURE — 25000003 PHARM REV CODE 250: Performed by: INTERNAL MEDICINE

## 2023-02-08 PROCEDURE — 63600175 PHARM REV CODE 636 W HCPCS: Performed by: NURSE PRACTITIONER

## 2023-02-08 PROCEDURE — 85025 COMPLETE CBC W/AUTO DIFF WBC: CPT

## 2023-02-08 PROCEDURE — 63600175 PHARM REV CODE 636 W HCPCS

## 2023-02-08 PROCEDURE — 20600001 HC STEP DOWN PRIVATE ROOM

## 2023-02-08 PROCEDURE — 25000003 PHARM REV CODE 250: Performed by: STUDENT IN AN ORGANIZED HEALTH CARE EDUCATION/TRAINING PROGRAM

## 2023-02-08 PROCEDURE — 84100 ASSAY OF PHOSPHORUS: CPT | Performed by: STUDENT IN AN ORGANIZED HEALTH CARE EDUCATION/TRAINING PROGRAM

## 2023-02-08 PROCEDURE — 94761 N-INVAS EAR/PLS OXIMETRY MLT: CPT

## 2023-02-08 PROCEDURE — A4216 STERILE WATER/SALINE, 10 ML: HCPCS | Performed by: PSYCHIATRY & NEUROLOGY

## 2023-02-08 PROCEDURE — 93010 ELECTROCARDIOGRAM REPORT: CPT | Mod: ,,, | Performed by: INTERNAL MEDICINE

## 2023-02-08 PROCEDURE — 80053 COMPREHEN METABOLIC PANEL: CPT | Performed by: STUDENT IN AN ORGANIZED HEALTH CARE EDUCATION/TRAINING PROGRAM

## 2023-02-08 PROCEDURE — 99024 POSTOP FOLLOW-UP VISIT: CPT | Mod: ,,,

## 2023-02-08 PROCEDURE — 93005 ELECTROCARDIOGRAM TRACING: CPT

## 2023-02-08 PROCEDURE — 83735 ASSAY OF MAGNESIUM: CPT | Performed by: STUDENT IN AN ORGANIZED HEALTH CARE EDUCATION/TRAINING PROGRAM

## 2023-02-08 PROCEDURE — 25000003 PHARM REV CODE 250

## 2023-02-08 PROCEDURE — 25000003 PHARM REV CODE 250: Performed by: PSYCHIATRY & NEUROLOGY

## 2023-02-08 PROCEDURE — 99233 PR SUBSEQUENT HOSPITAL CARE,LEVL III: ICD-10-PCS | Mod: ,,, | Performed by: STUDENT IN AN ORGANIZED HEALTH CARE EDUCATION/TRAINING PROGRAM

## 2023-02-08 PROCEDURE — 63600175 PHARM REV CODE 636 W HCPCS: Performed by: STUDENT IN AN ORGANIZED HEALTH CARE EDUCATION/TRAINING PROGRAM

## 2023-02-08 PROCEDURE — 99024 PR POST-OP FOLLOW-UP VISIT: ICD-10-PCS | Mod: ,,,

## 2023-02-08 PROCEDURE — 93010 EKG 12-LEAD: ICD-10-PCS | Mod: ,,, | Performed by: INTERNAL MEDICINE

## 2023-02-08 PROCEDURE — 27000207 HC ISOLATION

## 2023-02-08 PROCEDURE — 84484 ASSAY OF TROPONIN QUANT: CPT | Performed by: STUDENT IN AN ORGANIZED HEALTH CARE EDUCATION/TRAINING PROGRAM

## 2023-02-08 RX ORDER — HYDRALAZINE HYDROCHLORIDE 25 MG/1
25 TABLET, FILM COATED ORAL EVERY 8 HOURS PRN
Status: DISCONTINUED | OUTPATIENT
Start: 2023-02-08 | End: 2023-02-28 | Stop reason: HOSPADM

## 2023-02-08 RX ORDER — LIDOCAINE HYDROCHLORIDE 20 MG/ML
15 SOLUTION OROPHARYNGEAL ONCE
Status: COMPLETED | OUTPATIENT
Start: 2023-02-09 | End: 2023-02-09

## 2023-02-08 RX ORDER — MAG HYDROX/ALUMINUM HYD/SIMETH 200-200-20
30 SUSPENSION, ORAL (FINAL DOSE FORM) ORAL ONCE
Status: COMPLETED | OUTPATIENT
Start: 2023-02-09 | End: 2023-02-09

## 2023-02-08 RX ORDER — HYDROMORPHONE HYDROCHLORIDE 1 MG/ML
0.2 INJECTION, SOLUTION INTRAMUSCULAR; INTRAVENOUS; SUBCUTANEOUS EVERY 4 HOURS PRN
Status: DISCONTINUED | OUTPATIENT
Start: 2023-02-08 | End: 2023-02-09

## 2023-02-08 RX ADMIN — Medication 6 MG: at 08:02

## 2023-02-08 RX ADMIN — Medication: at 05:02

## 2023-02-08 RX ADMIN — METHOCARBAMOL 1000 MG: 500 TABLET ORAL at 08:02

## 2023-02-08 RX ADMIN — GABAPENTIN 600 MG: 300 CAPSULE ORAL at 09:02

## 2023-02-08 RX ADMIN — METHOCARBAMOL 1000 MG: 500 TABLET ORAL at 05:02

## 2023-02-08 RX ADMIN — GABAPENTIN 600 MG: 300 CAPSULE ORAL at 02:02

## 2023-02-08 RX ADMIN — DIAZEPAM 5 MG: 5 TABLET ORAL at 11:02

## 2023-02-08 RX ADMIN — METHOCARBAMOL 1000 MG: 500 TABLET ORAL at 09:02

## 2023-02-08 RX ADMIN — OXYCODONE HYDROCHLORIDE 10 MG: 10 TABLET ORAL at 05:02

## 2023-02-08 RX ADMIN — MEROPENEM 2 G: 1 INJECTION INTRAVENOUS at 05:02

## 2023-02-08 RX ADMIN — ONDANSETRON 4 MG: 2 INJECTION INTRAMUSCULAR; INTRAVENOUS at 09:02

## 2023-02-08 RX ADMIN — ACETAMINOPHEN 1000 MG: 500 TABLET ORAL at 08:02

## 2023-02-08 RX ADMIN — HYDROMORPHONE HYDROCHLORIDE 0.2 MG: 1 INJECTION, SOLUTION INTRAMUSCULAR; INTRAVENOUS; SUBCUTANEOUS at 09:02

## 2023-02-08 RX ADMIN — GABAPENTIN 600 MG: 300 CAPSULE ORAL at 08:02

## 2023-02-08 RX ADMIN — HEPARIN SODIUM 5000 UNITS: 5000 INJECTION INTRAVENOUS; SUBCUTANEOUS at 05:02

## 2023-02-08 RX ADMIN — OXYCODONE HYDROCHLORIDE 10 MG: 10 TABLET ORAL at 02:02

## 2023-02-08 RX ADMIN — HEPARIN SODIUM 5000 UNITS: 5000 INJECTION INTRAVENOUS; SUBCUTANEOUS at 02:02

## 2023-02-08 RX ADMIN — MEROPENEM 2 G: 1 INJECTION INTRAVENOUS at 03:02

## 2023-02-08 RX ADMIN — LACTULOSE 20 G: 20 SOLUTION ORAL at 09:02

## 2023-02-08 RX ADMIN — ACETAMINOPHEN 1000 MG: 500 TABLET ORAL at 09:02

## 2023-02-08 RX ADMIN — PANTOPRAZOLE SODIUM 40 MG: 40 TABLET, DELAYED RELEASE ORAL at 09:02

## 2023-02-08 RX ADMIN — Medication 10 ML: at 05:02

## 2023-02-08 RX ADMIN — OXYCODONE HYDROCHLORIDE 10 MG: 10 TABLET ORAL at 08:02

## 2023-02-08 RX ADMIN — DIAZEPAM 5 MG: 5 TABLET ORAL at 08:02

## 2023-02-08 RX ADMIN — Medication 10 ML: at 11:02

## 2023-02-08 RX ADMIN — FUROSEMIDE 60 MG: 40 TABLET ORAL at 05:02

## 2023-02-08 RX ADMIN — SPIRONOLACTONE 25 MG: 25 TABLET, FILM COATED ORAL at 09:02

## 2023-02-08 RX ADMIN — MEROPENEM 2 G: 1 INJECTION INTRAVENOUS at 11:02

## 2023-02-08 RX ADMIN — HEPARIN SODIUM 5000 UNITS: 5000 INJECTION INTRAVENOUS; SUBCUTANEOUS at 09:02

## 2023-02-08 RX ADMIN — OXYCODONE HYDROCHLORIDE 10 MG: 10 TABLET ORAL at 09:02

## 2023-02-08 RX ADMIN — HYDROMORPHONE HYDROCHLORIDE 0.2 MG: 1 INJECTION, SOLUTION INTRAMUSCULAR; INTRAVENOUS; SUBCUTANEOUS at 05:02

## 2023-02-08 RX ADMIN — METHOCARBAMOL 1000 MG: 500 TABLET ORAL at 02:02

## 2023-02-08 RX ADMIN — ACETAMINOPHEN 1000 MG: 500 TABLET ORAL at 02:02

## 2023-02-08 RX ADMIN — Medication 10 ML: at 02:02

## 2023-02-08 RX ADMIN — FUROSEMIDE 40 MG: 40 TABLET ORAL at 09:02

## 2023-02-08 RX ADMIN — BUPROPION HYDROCHLORIDE 300 MG: 300 TABLET, FILM COATED, EXTENDED RELEASE ORAL at 09:02

## 2023-02-08 RX ADMIN — Medication 10 ML: at 06:02

## 2023-02-08 RX ADMIN — Medication 10 ML: at 12:02

## 2023-02-08 NOTE — SUBJECTIVE & OBJECTIVE
Interval History: NAEON. AFVSS. Exam stable. States she was able to sit at EOB with therapy for 15 minutes. Multiple Bms. WV removed. PCA dc'ed today. IV dilaudid q4 as needed, will wean to off. Keep drains and put to gravity. H/H stable.     Medications:  Continuous Infusions:  Scheduled Meds:   acetaminophen  1,000 mg Oral TID    bisacodyL  10 mg Rectal Every other day    buPROPion  300 mg Oral Daily    furosemide  60 mg Oral BID    gabapentin  600 mg Oral TID    heparin (porcine)  5,000 Units Subcutaneous Q8H    lactulose  20 g Oral TID    meropenem (MERREM) IVPB  2 g Intravenous Q8H    methocarbamoL  1,000 mg Oral QID    pantoprazole  40 mg Oral Daily    sodium chloride 0.9%  10 mL Intravenous Q6H    spironolactone  25 mg Oral Daily     PRN Meds:sodium chloride, sodium chloride, dextrose 10%, dextrose 10%, diazePAM, glucagon (human recombinant), glucose, glucose, hydrALAZINE, HYDROmorphone, magnesium hydroxide 400 mg/5 ml, melatonin, naloxone, ondansetron, oxyCODONE, polyethylene glycol, prochlorperazine, senna-docusate 8.6-50 mg, sodium chloride 0.9%, Flushing PICC Protocol **AND** sodium chloride 0.9% **AND** sodium chloride 0.9%     Review of Systems  Objective:     Weight: 132 kg (291 lb 0.1 oz)  Body mass index is 45.58 kg/m².  Vital Signs (Most Recent):  Temp: 99.3 °F (37.4 °C) (02/08/23 1501)  Pulse: (!) 113 (02/08/23 1501)  Resp: 14 (02/08/23 1457)  BP: (!) 111/56 (02/08/23 1501)  SpO2: 97 % (02/08/23 1501)   Vital Signs (24h Range):  Temp:  [98 °F (36.7 °C)-99.3 °F (37.4 °C)] 99.3 °F (37.4 °C)  Pulse:  [108-116] 113  Resp:  [13-24] 14  SpO2:  [92 %-100 %] 97 %  BP: (103-120)/(55-67) 111/56     Date 02/08/23 0700 - 02/09/23 0659   Shift 9409-9074 0551-7478 0242-3183 24 Hour Total   INTAKE   Shift Total(mL/kg)       OUTPUT   Drains 260   260   Shift Total(mL/kg) 260(2)   260(2)   Weight (kg) 132 132 132 132                        Closed/Suction Drain 02/02/23 1401 Right Back Bulb 15 Fr. (Active)   Site  Description Healing 02/08/23 0459   Dressing Type Transparent (Tegaderm) 02/08/23 0459   Dressing Status Clean;Dry;Intact 02/08/23 0730   Dressing Intervention Integrity maintained 02/08/23 0730   Drainage Serosanguineous 02/08/23 0730   Status To bulb suction 02/08/23 0730   Output (mL) 50 mL 02/08/23 0730            Closed/Suction Drain 02/02/23 1402 Right Back Bulb 15 Fr. (Active)   Site Description Healing 02/08/23 0459   Dressing Type Transparent (Tegaderm) 02/08/23 0459   Dressing Status Clean;Dry;Intact 02/08/23 0730   Dressing Intervention Integrity maintained 02/08/23 0730   Drainage Serosanguineous 02/08/23 0730   Status To bulb suction 02/08/23 0730   Output (mL) 40 mL 02/08/23 0730            Closed/Suction Drain 02/02/23 1402 Left Back Bulb 15 Fr. (Active)   Site Description Healing 02/08/23 0459   Dressing Type Transparent (Tegaderm) 02/08/23 0459   Dressing Status Clean;Dry;Intact 02/08/23 0730   Dressing Intervention Integrity maintained 02/08/23 0730   Drainage Serosanguineous 02/08/23 0730   Status To bulb suction 02/08/23 0730   Output (mL) 70 mL 02/08/23 0730            Closed/Suction Drain 02/02/23 1402 Left Back Bulb 15 Fr. (Active)   Site Description Healing 02/08/23 0459   Dressing Type Transparent (Tegaderm) 02/08/23 0459   Dressing Status Clean;Dry;Intact 02/08/23 0730   Dressing Intervention Integrity maintained 02/08/23 0730   Drainage Serosanguineous 02/08/23 0730   Status To bulb suction 02/08/23 0730   Output (mL) 100 mL 02/08/23 0730       Neurosurgery Physical Exam  General: well developed, well nourished, no distress.   Head: normocephalic, atraumatic  Neurologic: Alert and oriented. Thought content appropriate.  GCS: Motor: 6/Verbal: 5/Eyes: 4 GCS Total: 15  Mental Status: Awake, Alert, Oriented x 4  Language: No aphasia  Speech: No dysarthria  Cranial nerves: face symmetric, tongue midline, CN II-XII grossly intact.   Eyes: pupils equal, round, reactive to light with  accommodation, EOMI, nystagmus.   Pulmonary: normal respirations, no signs of respiratory distress  Abdomen: soft, non-distended, not tender to palpation  Skin: Skin is warm, dry and intact.  Sensory: intact to light touch throughout  Motor Strength:Moves all extremities spontaneously with good tone.  Full strength upper and extremities. No abnormal movements seen.      Strength   Deltoids Triceps Biceps Wrist Extension Wrist Flexion Hand    Upper: R 5/5 5/5 5/5 5/5 5/5 5/5     L 5/5 5/5 5/5 5/5 5/5 5/5       Iliopsoas Quadriceps Knee  Flexion Tibialis  anterior Gastro- cnemius EHL   Lower: R 3/5 4/5 4/5 4+/5 4+/5 4+/5     L 3/5 4/5 4/5 4+/5 4+/5 4+/5      Some pain and habitus limitation.    Incision clean/dry/intact.     Significant Labs:  Recent Labs   Lab 02/07/23 0357 02/08/23 0455   * 97   * 131*   K 4.0 4.0    101   CO2 23 23   BUN 18 20   CREATININE 0.5 0.5   CALCIUM 7.6* 7.4*   MG 1.8 1.9     Recent Labs   Lab 02/06/23  1916 02/07/23 0357 02/08/23 0455   WBC 6.05 6.11 5.54   HGB 7.7* 7.6* 7.7*   HCT 25.0* 24.8* 25.4*   * 121* 122*     No results for input(s): LABPT, INR, APTT in the last 48 hours.  Microbiology Results (last 7 days)       Procedure Component Value Units Date/Time    Fungus culture [[id number]] Collected: 01/24/23 0913    Order Status: Completed Specimen: Body Fluid from Back Updated: 02/08/23 0655     Fungus (Mycology) Culture Culture in progress      No fungus isolated after 2 weeks    Narrative:      1) Perispinal    Fungus culture [[id number]] Collected: 01/24/23 0943    Order Status: Completed Specimen: Bone from Back Updated: 02/08/23 0655     Fungus (Mycology) Culture Culture in progress      No fungus isolated after 2 weeks    Narrative:      3) Perispinal    Fungus culture [[id number]] Collected: 01/24/23 0943    Order Status: Completed Specimen: Bone from Back Updated: 02/08/23 0655     Fungus (Mycology) Culture Culture in progress      No fungus  isolated after 2 weeks    Narrative:      4) Perispinal    Fungus culture [[id number]] Collected: 01/24/23 0913    Order Status: Completed Specimen: Body Fluid from Back Updated: 02/08/23 0655     Fungus (Mycology) Culture Culture in progress      No fungus isolated after 2 weeks    Narrative:      2) Perispinal          All pertinent labs from the last 24 hours have been reviewed.    Significant Diagnostics:  I have reviewed and interpreted all pertinent imaging results/findings within the past 24 hours.

## 2023-02-08 NOTE — PROGRESS NOTES
[facility] - Telemetry Stepdown  Neurosurgery  Progress Note    Subjective:     History of Present Illness: Ms. Zazueta is a 42 y.o F w/ hx ofIV drug use (methamphetamine), chronic HCV with cirrhosis, spinal fusion (04/2021), epidural abscess with removal of hardware (02/2022), spinal fusion with hardware (T10 - Pelvis / 03/2022), obesity (BMI 39.3) and neurogenic bladder and recent shoulder surgery who initially presented to [facility] for evaluation of back pain and left leg weakness.  She reports initially noting the left leg weakness when she was about to go to the bathroom and was about to fall but was assisted by her boyfriend.  She was brought to the ED via EMS.  Urinalysis with UTI concerns and blood cultures at OSH grew ESBL E coli, and shoulder effusion concern for infection so she was treated with meropenem.  During hospitalization, she reports the weakness that started out in her left leg initially then spread upwards to her left thigh, left hip, then crossed over to the right hip and spread to her right leg.  Denies recent IV drug use. She reports her last incidence of drug use was more than 3 years ago and also denies tobacco, and alcohol abuse.  Reports cannabis use.     OSH course notable for concerning urinalysis and blood cultures positive for ESBL E coli treated with meropenem.  She was also admit to the ICU where she was treated with Precedex for significant body aches, irritability, and muscle spasms.  Concerns of a murmur on physical exam so she underwent TTE which did not show any vegetations.  Worsening lower extremity weakness workup with MRI head nonspecific, MRI cervical spine with multilevel spondylosis, X-ray spine with multilevel thoracolumbar fusion and diskectomy, focal kyphosis at T9-10 increased compared to prior.  She was started on prophylaxis amoxicillin due to her history of infected hardware.  MRI spine unable to be completed due to patient's body habitus so [place] was  [place] [facility] and Neurosurgery, Neurology evaluation.      Post-Op Info:  Procedure(s) (LRB):  LAMINECTOMY, SPINE, THORACIC, WITH FUSION (T4-Pelvis fusion w/ T9-10 corpectomies) **AIRO (N/A)   6 Days Post-Op     Interval History: NAEON. AFVSS. Exam stable. States she was able to sit at EOB with therapy for 15 minutes. Multiple Bms. WV removed. PCA dc'ed today. IV dilaudid q4 as needed, will wean to off. Keep drains and put to gravity. H/H stable.     Medications:  Continuous Infusions:  Scheduled Meds:   acetaminophen  1,000 mg Oral TID    bisacodyL  10 mg Rectal Every other day    buPROPion  300 mg Oral Daily    furosemide  60 mg Oral BID    gabapentin  600 mg Oral TID    heparin (porcine)  5,000 Units Subcutaneous Q8H    lactulose  20 g Oral TID    meropenem (MERREM) IVPB  2 g Intravenous Q8H    methocarbamoL  1,000 mg Oral QID    pantoprazole  40 mg Oral Daily    sodium chloride 0.9%  10 mL Intravenous Q6H    spironolactone  25 mg Oral Daily     PRN Meds:sodium chloride, sodium chloride, dextrose 10%, dextrose 10%, diazePAM, glucagon (human recombinant), glucose, glucose, hydrALAZINE, HYDROmorphone, magnesium hydroxide 400 mg/5 ml, melatonin, naloxone, ondansetron, oxyCODONE, polyethylene glycol, prochlorperazine, senna-docusate 8.6-50 mg, sodium chloride 0.9%, Flushing PICC Protocol **AND** sodium chloride 0.9% **AND** sodium chloride 0.9%     Review of Systems  Objective:     Weight: 132 kg (291 lb 0.1 oz)  Body mass index is 45.58 kg/m².  Vital Signs (Most Recent):  Temp: 99.3 °F (37.4 °C) (02/08/23 1501)  Pulse: (!) 113 (02/08/23 1501)  Resp: 14 (02/08/23 1457)  BP: (!) 111/56 (02/08/23 1501)  SpO2: 97 % (02/08/23 1501)   Vital Signs (24h Range):  Temp:  [98 °F (36.7 °C)-99.3 °F (37.4 °C)] 99.3 °F (37.4 °C)  Pulse:  [108-116] 113  Resp:  [13-24] 14  SpO2:  [92 %-100 %] 97 %  BP: (103-120)/(55-67) 111/56     Date 02/08/23 0700 - 02/09/23 0659   Shift 7580-4550 8557-8381  3802-2871 24 Hour Total   INTAKE   Shift Total(mL/kg)       OUTPUT   Drains 260   260   Shift Total(mL/kg) 260(2)   260(2)   Weight (kg) 132 132 132 132                        Closed/Suction Drain 02/02/23 1401 Right Back Bulb 15 Fr. (Active)   Site Description Healing 02/08/23 0459   Dressing Type Transparent (Tegaderm) 02/08/23 0459   Dressing Status Clean;Dry;Intact 02/08/23 0730   Dressing Intervention Integrity maintained 02/08/23 0730   Drainage Serosanguineous 02/08/23 0730   Status To bulb suction 02/08/23 0730   Output (mL) 50 mL 02/08/23 0730            Closed/Suction Drain 02/02/23 1402 Right Back Bulb 15 Fr. (Active)   Site Description Healing 02/08/23 0459   Dressing Type Transparent (Tegaderm) 02/08/23 0459   Dressing Status Clean;Dry;Intact 02/08/23 0730   Dressing Intervention Integrity maintained 02/08/23 0730   Drainage Serosanguineous 02/08/23 0730   Status To bulb suction 02/08/23 0730   Output (mL) 40 mL 02/08/23 0730            Closed/Suction Drain 02/02/23 1402 Left Back Bulb 15 Fr. (Active)   Site Description Healing 02/08/23 0459   Dressing Type Transparent (Tegaderm) 02/08/23 0459   Dressing Status Clean;Dry;Intact 02/08/23 0730   Dressing Intervention Integrity maintained 02/08/23 0730   Drainage Serosanguineous 02/08/23 0730   Status To bulb suction 02/08/23 0730   Output (mL) 70 mL 02/08/23 0730            Closed/Suction Drain 02/02/23 1402 Left Back Bulb 15 Fr. (Active)   Site Description Healing 02/08/23 0459   Dressing Type Transparent (Tegaderm) 02/08/23 0459   Dressing Status Clean;Dry;Intact 02/08/23 0730   Dressing Intervention Integrity maintained 02/08/23 0730   Drainage Serosanguineous 02/08/23 0730   Status To bulb suction 02/08/23 0730   Output (mL) 100 mL 02/08/23 0730       Neurosurgery Physical Exam  General: well developed, well nourished, no distress.   Head: normocephalic, atraumatic  Neurologic: Alert and oriented. Thought content appropriate.  GCS: Motor: 6/Verbal:  5/Eyes: 4 GCS Total: 15  Mental Status: Awake, Alert, Oriented x 4  Language: No aphasia  Speech: No dysarthria  Cranial nerves: face symmetric, tongue midline, CN II-XII grossly intact.   Eyes: pupils equal, round, reactive to light with accommodation, EOMI, nystagmus.   Pulmonary: normal respirations, no signs of respiratory distress  Abdomen: soft, non-distended, not tender to palpation  Skin: Skin is warm, dry and intact.  Sensory: intact to light touch throughout  Motor Strength:Moves all extremities spontaneously with good tone.  Full strength upper and extremities. No abnormal movements seen.      Strength   Deltoids Triceps Biceps Wrist Extension Wrist Flexion Hand    Upper: R 5/5 5/5 5/5 5/5 5/5 5/5     L 5/5 5/5 5/5 5/5 5/5 5/5       Iliopsoas Quadriceps Knee  Flexion Tibialis  anterior Gastro- cnemius EHL   Lower: R 3/5 4/5 4/5 4+/5 4+/5 4+/5     L 3/5 4/5 4/5 4+/5 4+/5 4+/5      Some pain and habitus limitation.    Incision clean/dry/intact.     Significant Labs:  Recent Labs   Lab 02/07/23  0357 02/08/23  0455   * 97   * 131*   K 4.0 4.0    101   CO2 23 23   BUN 18 20   CREATININE 0.5 0.5   CALCIUM 7.6* 7.4*   MG 1.8 1.9     Recent Labs   Lab 02/06/23  1916 02/07/23  0357 02/08/23  0455   WBC 6.05 6.11 5.54   HGB 7.7* 7.6* 7.7*   HCT 25.0* 24.8* 25.4*   * 121* 122*     No results for input(s): LABPT, INR, APTT in the last 48 hours.  Microbiology Results (last 7 days)       Procedure Component Value Units Date/Time    Fungus culture [[id number]] Collected: 01/24/23 0913    Order Status: Completed Specimen: Body Fluid from Back Updated: 02/08/23 0655     Fungus (Mycology) Culture Culture in progress      No fungus isolated after 2 weeks    Narrative:      1) Perispinal    Fungus culture [[id number]] Collected: 01/24/23 0943    Order Status: Completed Specimen: Bone from Back Updated: 02/08/23 0655     Fungus (Mycology) Culture Culture in progress      No fungus isolated after 2  weeks    Narrative:      3) Perispinal    Fungus culture [[id number]] Collected: 01/24/23 0943    Order Status: Completed Specimen: Bone from Back Updated: 02/08/23 0655     Fungus (Mycology) Culture Culture in progress      No fungus isolated after 2 weeks    Narrative:      4) Perispinal    Fungus culture [[id number]] Collected: 01/24/23 0913    Order Status: Completed Specimen: Body Fluid from Back Updated: 02/08/23 0655     Fungus (Mycology) Culture Culture in progress      No fungus isolated after 2 weeks    Narrative:      2) Perispinal          All pertinent labs from the last 24 hours have been reviewed.    Significant Diagnostics:  I have reviewed and interpreted all pertinent imaging results/findings within the past 24 hours.    Assessment/Plan:     * Weakness of both lower extremities  Pt is 42F multiple spinal surgeries and revisions last T10- Pelvis in March 2022 who presented to OSH with concern for shoulder infection and UTI found to have E coli sepsis who has had progressive worsening BLE weakness, XR showed possible worsening proximal junctional kyphotic deformity. Transferred to [facility] to  and neurosurgery was consulted.    OR 1/24 for temporary T6-12 PSIF. Taz pus noted intraoperatively.   OR 2/2 for T4-pelvis revision and extension of fusion with T9-10 corpectomy with plastic surgery assistance.  Maps goals > 85 completed 2/5 in ICU.    Plan:  --Stepped down to  floor.   -q4h neurochecks.  --All labs & diagnostics reviewed.   -post op xrays pending.  --TLSO to be worn when OOB only.  --MIHIR drains x4, monitor output qshift. Keep to gravity. Monitor H/H. Transfuse for Hg <7.0 or symptoms of anemia.  --Plastic surgery managing incision.  --ID: IV Meropenem.   -- BCx 1/20: NGTD   -- OR cultures 1/24: E. coli  --Multimodal pain control per primary medicine team. Dc PCA pump. IV dilaudid prn wean to off.  --SQH/TEDs/SCDs.  --NSGY will continue to follow. Please contact team with any further  questions.        Afua Campbell PA-C  Neurosurgery  [facility] - Telemetry Stepdown

## 2023-02-08 NOTE — PLAN OF CARE
[facility] Medicine ICU Acceptance Note    Date of Admit: 1/19/2023  Date of Transfer / Stepdown: 2/7/2023  Karma, C/J, H, L, Onc (IV chemo w/in 1 month), Gyn/Onc, or other special case?: No   ICU team stepping patient down: [employer]  ICU team member giving verbal handoff: Rachel Marlow   Accepting  team: N    Brief History of Present Illness:      Rachel Zazueta is a 42 y.o. female who  has a past medical history of Anemia, Anxiety, Depressed, Diskitis, Hep C w/o coma, chronic, IV drug abuse, Kidney stone, and Liver cirrhosis.. The patient presented to [facility] on 1/19/2023 with a primary complaint of lower extremity weakness    Hospital/ICU Course:     Found to have T8-T10 kyphosis on MRI. 1/24 underwent laminectomy T8-T10; posterior fusion T8-T12; and washout. 2/2 underwent T4-pelvis fusion and T9-T10 corpectomy.      Consultants and Procedures:     Consultants:  Infectious disease  Neurosurgery  Plastic surgery   Orthopedic surgery    Procedures:    1/24, Neurosurgery, Orthopedic surgery  1. Removal of posterior spinal hardware T10 to pelvis and reinsertion of hardware at T11 and T12  2. Wound washout and excisional debridement, T10 to pelvis, with placement of epidural antibiotic beads  3. T8, T9 and T10 laminectomy for severe central stenosis with spinal cord compression  4. Posterior segmental spinal fixation, T7-T12 ([employer]s)      2/2, Plastic Surgery   1. Bilateral paraspinous muscle flap closure  2. Bilateral latissimus dorsi muscle flap closure   3. Advancement flap closure of the skin measuring approximately 35 cm by approximately 8 cm    Transfer Information:     Diet:  Regular    Physical Activity:  PT/OT    To Do / Pending Studies / Follow ups:  - f/u postop XR  - adjust diuresis  - adjust pain regimen       Patient has been accepted by [facility] Medicine Team N, who will assume care of the patient upon arrival to the floor from the ICU. Please contact ICU team with any concerns  prior to arrival. Please contact Hospital Medicine at [phone] or [phone] (please do NOT leave a voicemail) when patient arrives to the floor.    Danny Ding MD  Hospital Medicine Staff

## 2023-02-08 NOTE — ASSESSMENT & PLAN NOTE
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 Transferred to [facility] to  and neurosurgery was consulted.    OR 1/24 for temporary T6-12 PSIF. Taz pus noted intraoperatively.   OR 2/2 for T4-pelvis revision and extension of fusion with T9-10 corpectomy with plastic surgery assistance.  Maps goals > 85 completed 2/5 in ICU.    Plan:  --Stepped down to  floor.   -q4h neurochecks.  --All labs & diagnostics reviewed.   -post op xrays pending.  --TLSO to be worn when OOB only.  --MIHIR drains x4, monitor output qshift. Keep to gravity. Monitor H/H. Transfuse for Hg <7.0 or symptoms of anemia.  --Plastic surgery managing incision.  --ID: IV Meropenem.   -- BCx 1/20: NGTD   -- OR cultures 1/24: E. coli  --Multimodal pain control per primary medicine team. Dc PCA pump. IV dilaudid prn wean to off.  --SQH/TEDs/SCDs.  --NSGY will continue to follow. Please contact team with any further questions.

## 2023-02-08 NOTE — PLAN OF CARE
ALICIA faxed H&P as requested to [place] dept of Health - [email or web address]  Sun Guzmán LCSW   II  [facility]  Nursing Facility Admissions: [phone]  [email or web address]      Julissa Oswald LMSW  PRN - [facility]  [phone]

## 2023-02-08 NOTE — PROGRESS NOTES
Plastic and Reconstructive Surgery   Progress Note    Subjective:    No acute events, afebrile. Not on pressors.   Drains are still very high output. SS.      Objective:  Vital signs in last 24 hours:  Temp:  [98 °F (36.7 °C)-98.9 °F (37.2 °C)] 98 °F (36.7 °C)  Pulse:  [108-116] 111  Resp:  [13-24] 14  SpO2:  [92 %-100 %] 100 %  BP: ()/(51-67) 120/67    Intake/Output last 3 shifts:  I/O last 3 completed shifts:  In: 2117.3 [P.O.:1400; I.V.:421.1; IV Piggyback:296.2]  Out: 4320 [Urine:1645; Drains:2675]    Intake/Output this shift:  I/O this shift:  In: -   Out: 260 [Drains:260]        Physical Exam:  VITAL SIGNS:   Vitals:    23 0704 23 0929 23 0935 23 0941   BP:  120/67 120/67    BP Location:   Right arm    Patient Position:   Lying    Pulse: (!) 114  (!) 111    Resp:  13 13 14   Temp:       TempSrc:       SpO2:   100%    Weight:       Height:         TMAX: Temp (24hrs), Av.3 °F (36.8 °C), Min:98 °F (36.7 °C), Max:98.9 °F (37.2 °C)    General: Alert; No acute distress  Cardiovascular: Regular rate   Respiratory: Normal respiratory effort. Chest rise symmetric.   Abdomen: Soft, nontender, nondistended  Extremity: Moves all extremities equally.  Neurologic: No focal deficit. Speech normal  Spine incision is intact. Staples still in place.no signs of infection      Scheduled Medications acetaminophen, 1,000 mg, TID  bisacodyL, 10 mg, Every other day  buPROPion, 300 mg, Daily  furosemide, 40 mg, BID  gabapentin, 600 mg, TID  heparin (porcine), 5,000 Units, Q8H  lactulose, 20 g, TID  meropenem (MERREM) IVPB, 2 g, Q8H  methocarbamoL, 1,000 mg, QID  pantoprazole, 40 mg, Daily  sodium chloride 0.9%, 10 mL, Q6H  spironolactone, 25 mg, Daily      PRN Medications sodium chloride, sodium chloride, dextrose 10%, dextrose 10%, diazePAM, glucagon (human recombinant), glucose, glucose, hydrALAZINE, HYDROmorphone, magnesium hydroxide 400 mg/5 ml, melatonin, naloxone, ondansetron, oxyCODONE,  polyethylene glycol, prochlorperazine, senna-docusate 8.6-50 mg, sodium chloride 0.9%, Flushing PICC Protocol **AND** sodium chloride 0.9% **AND** sodium chloride 0.9%    Recent Labs:   Lab Results   Component Value Date    WBC 5.54 02/08/2023    HGB 7.7 (L) 02/08/2023    HCT 25.4 (L) 02/08/2023    MCV 96 02/08/2023     (L) 02/08/2023     Lab Results   Component Value Date    GLU 97 02/08/2023     (L) 02/08/2023    K 4.0 02/08/2023     02/08/2023    BUN 20 02/08/2023         Assessment: 42 y.o. y/o female 6 Days Post-Op s/p Procedure(s):  LAMINECTOMY, SPINE, THORACIC, WITH FUSION (T4-Pelvis fusion w/ T9-10 corpectomies) **AIRO Doing well postoperatively.  Drains with SSO , still with high volume    Plan  -Continue to monitor output  -incisional vac discontinued  -Rest of care per primary

## 2023-02-08 NOTE — PLAN OF CARE
Pt A&Ox4. Edema weeping BLE and pitting along entire torse. 4 lorie drains all open no longer to bulb suction as of this afternoon. Wound vac dc and site ROSS. Staples intact. BM today 2/8. Pain managed with oral mediations PCA was dc this morning.

## 2023-02-08 NOTE — PLAN OF CARE
02/08/23 1517   Post-Acute Status   Post-Acute Authorization Placement   Post-Acute Placement Status Pending medical clearance/testing     Patient prefers [facility] [place] as first preference for LTAC. Patient is accepted to [facility]. Updated clinicals were sent to [facility] in [facility] today.    Milla Hagan LCSW  [facility]  Ext. [phone]

## 2023-02-09 ENCOUNTER — ANESTHESIA (OUTPATIENT)
Dept: MEDSURG UNIT | Facility: HOSPITAL | Age: 43
DRG: 453 | End: 2023-02-09
Payer: MEDICAID

## 2023-02-09 ENCOUNTER — ANESTHESIA EVENT (OUTPATIENT)
Dept: MEDSURG UNIT | Facility: HOSPITAL | Age: 43
DRG: 453 | End: 2023-02-09
Payer: MEDICAID

## 2023-02-09 PROBLEM — E87.1 HYPONATREMIA: Status: ACTIVE | Noted: 2023-02-09

## 2023-02-09 PROBLEM — E66.01 SEVERE OBESITY (BMI >= 40): Status: ACTIVE | Noted: 2022-02-09

## 2023-02-09 LAB
ALBUMIN SERPL BCP-MCNC: 1.8 G/DL (ref 3.5–5.2)
ALBUMIN SERPL BCP-MCNC: 1.9 G/DL (ref 3.5–5.2)
ALP SERPL-CCNC: 149 U/L (ref 55–135)
ALT SERPL W/O P-5'-P-CCNC: 31 U/L (ref 10–44)
ANION GAP SERPL CALC-SCNC: 6 MMOL/L (ref 8–16)
ANION GAP SERPL CALC-SCNC: 6 MMOL/L (ref 8–16)
AST SERPL-CCNC: 42 U/L (ref 10–40)
BASOPHILS # BLD AUTO: 0.04 K/UL (ref 0–0.2)
BASOPHILS NFR BLD: 0.8 % (ref 0–1.9)
BILIRUB SERPL-MCNC: 1.7 MG/DL (ref 0.1–1)
BUN SERPL-MCNC: 21 MG/DL (ref 6–20)
BUN SERPL-MCNC: 22 MG/DL (ref 6–20)
CALCIUM SERPL-MCNC: 7.2 MG/DL (ref 8.7–10.5)
CALCIUM SERPL-MCNC: 7.6 MG/DL (ref 8.7–10.5)
CHLORIDE SERPL-SCNC: 98 MMOL/L (ref 95–110)
CHLORIDE SERPL-SCNC: 99 MMOL/L (ref 95–110)
CO2 SERPL-SCNC: 23 MMOL/L (ref 23–29)
CO2 SERPL-SCNC: 24 MMOL/L (ref 23–29)
CREAT SERPL-MCNC: 0.5 MG/DL (ref 0.5–1.4)
CREAT SERPL-MCNC: 0.6 MG/DL (ref 0.5–1.4)
DIFFERENTIAL METHOD: ABNORMAL
EOSINOPHIL # BLD AUTO: 0.2 K/UL (ref 0–0.5)
EOSINOPHIL NFR BLD: 4.2 % (ref 0–8)
ERYTHROCYTE [DISTWIDTH] IN BLOOD BY AUTOMATED COUNT: 21.1 % (ref 11.5–14.5)
EST. GFR  (NO RACE VARIABLE): >60 ML/MIN/1.73 M^2
EST. GFR  (NO RACE VARIABLE): >60 ML/MIN/1.73 M^2
GLUCOSE SERPL-MCNC: 106 MG/DL (ref 70–110)
GLUCOSE SERPL-MCNC: 123 MG/DL (ref 70–110)
HCT VFR BLD AUTO: 26.2 % (ref 37–48.5)
HGB BLD-MCNC: 8.1 G/DL (ref 12–16)
IMM GRANULOCYTES # BLD AUTO: 0.03 K/UL (ref 0–0.04)
IMM GRANULOCYTES NFR BLD AUTO: 0.6 % (ref 0–0.5)
LIPASE SERPL-CCNC: 16 U/L (ref 4–60)
LYMPHOCYTES # BLD AUTO: 0.7 K/UL (ref 1–4.8)
LYMPHOCYTES NFR BLD: 13 % (ref 18–48)
MAGNESIUM SERPL-MCNC: 1.9 MG/DL (ref 1.6–2.6)
MCH RBC QN AUTO: 29.2 PG (ref 27–31)
MCHC RBC AUTO-ENTMCNC: 30.9 G/DL (ref 32–36)
MCV RBC AUTO: 95 FL (ref 82–98)
MONOCYTES # BLD AUTO: 0.6 K/UL (ref 0.3–1)
MONOCYTES NFR BLD: 11.8 % (ref 4–15)
NEUTROPHILS # BLD AUTO: 3.5 K/UL (ref 1.8–7.7)
NEUTROPHILS NFR BLD: 69.6 % (ref 38–73)
NRBC BLD-RTO: 0 /100 WBC
OSMOLALITY UR: 513 MOSM/KG (ref 50–1200)
PHOSPHATE SERPL-MCNC: 2.8 MG/DL (ref 2.7–4.5)
PHOSPHATE SERPL-MCNC: 2.8 MG/DL (ref 2.7–4.5)
PLATELET # BLD AUTO: 114 K/UL (ref 150–450)
PMV BLD AUTO: 9.1 FL (ref 9.2–12.9)
POTASSIUM SERPL-SCNC: 4 MMOL/L (ref 3.5–5.1)
POTASSIUM SERPL-SCNC: 4.2 MMOL/L (ref 3.5–5.1)
PROT SERPL-MCNC: 5.1 G/DL (ref 6–8.4)
RBC # BLD AUTO: 2.77 M/UL (ref 4–5.4)
SODIUM SERPL-SCNC: 127 MMOL/L (ref 136–145)
SODIUM SERPL-SCNC: 129 MMOL/L (ref 136–145)
SODIUM UR-SCNC: <10 MMOL/L (ref 20–250)
WBC # BLD AUTO: 4.99 K/UL (ref 3.9–12.7)

## 2023-02-09 PROCEDURE — 99024 PR POST-OP FOLLOW-UP VISIT: ICD-10-PCS | Mod: ,,, | Performed by: PHYSICIAN ASSISTANT

## 2023-02-09 PROCEDURE — 94761 N-INVAS EAR/PLS OXIMETRY MLT: CPT

## 2023-02-09 PROCEDURE — 99233 SBSQ HOSP IP/OBS HIGH 50: CPT | Mod: ,,, | Performed by: HOSPITALIST

## 2023-02-09 PROCEDURE — 83690 ASSAY OF LIPASE: CPT | Performed by: STUDENT IN AN ORGANIZED HEALTH CARE EDUCATION/TRAINING PROGRAM

## 2023-02-09 PROCEDURE — 99233 PR SUBSEQUENT HOSPITAL CARE,LEVL III: ICD-10-PCS | Mod: ,,, | Performed by: HOSPITALIST

## 2023-02-09 PROCEDURE — 83935 ASSAY OF URINE OSMOLALITY: CPT | Performed by: HOSPITALIST

## 2023-02-09 PROCEDURE — 25000003 PHARM REV CODE 250: Performed by: PSYCHIATRY & NEUROLOGY

## 2023-02-09 PROCEDURE — 25000003 PHARM REV CODE 250: Performed by: STUDENT IN AN ORGANIZED HEALTH CARE EDUCATION/TRAINING PROGRAM

## 2023-02-09 PROCEDURE — 84100 ASSAY OF PHOSPHORUS: CPT | Performed by: STUDENT IN AN ORGANIZED HEALTH CARE EDUCATION/TRAINING PROGRAM

## 2023-02-09 PROCEDURE — 63600175 PHARM REV CODE 636 W HCPCS

## 2023-02-09 PROCEDURE — 20600001 HC STEP DOWN PRIVATE ROOM

## 2023-02-09 PROCEDURE — 85025 COMPLETE CBC W/AUTO DIFF WBC: CPT

## 2023-02-09 PROCEDURE — 27000207 HC ISOLATION

## 2023-02-09 PROCEDURE — 84300 ASSAY OF URINE SODIUM: CPT | Performed by: HOSPITALIST

## 2023-02-09 PROCEDURE — 25000003 PHARM REV CODE 250: Performed by: INTERNAL MEDICINE

## 2023-02-09 PROCEDURE — 63600175 PHARM REV CODE 636 W HCPCS: Performed by: STUDENT IN AN ORGANIZED HEALTH CARE EDUCATION/TRAINING PROGRAM

## 2023-02-09 PROCEDURE — 97530 THERAPEUTIC ACTIVITIES: CPT | Mod: CQ

## 2023-02-09 PROCEDURE — 99024 POSTOP FOLLOW-UP VISIT: CPT | Mod: ,,, | Performed by: PHYSICIAN ASSISTANT

## 2023-02-09 PROCEDURE — 25000003 PHARM REV CODE 250

## 2023-02-09 PROCEDURE — 80053 COMPREHEN METABOLIC PANEL: CPT | Performed by: STUDENT IN AN ORGANIZED HEALTH CARE EDUCATION/TRAINING PROGRAM

## 2023-02-09 PROCEDURE — 83735 ASSAY OF MAGNESIUM: CPT | Performed by: STUDENT IN AN ORGANIZED HEALTH CARE EDUCATION/TRAINING PROGRAM

## 2023-02-09 PROCEDURE — A4216 STERILE WATER/SALINE, 10 ML: HCPCS | Performed by: PSYCHIATRY & NEUROLOGY

## 2023-02-09 PROCEDURE — 80069 RENAL FUNCTION PANEL: CPT | Performed by: HOSPITALIST

## 2023-02-09 PROCEDURE — 63600175 PHARM REV CODE 636 W HCPCS: Performed by: NURSE PRACTITIONER

## 2023-02-09 RX ORDER — GABAPENTIN 300 MG/1
900 CAPSULE ORAL 3 TIMES DAILY
Status: DISCONTINUED | OUTPATIENT
Start: 2023-02-09 | End: 2023-02-28 | Stop reason: HOSPADM

## 2023-02-09 RX ORDER — HYDROMORPHONE HYDROCHLORIDE 1 MG/ML
0.5 INJECTION, SOLUTION INTRAMUSCULAR; INTRAVENOUS; SUBCUTANEOUS EVERY 4 HOURS PRN
Status: DISCONTINUED | OUTPATIENT
Start: 2023-02-09 | End: 2023-02-28 | Stop reason: HOSPADM

## 2023-02-09 RX ORDER — OXYCODONE HYDROCHLORIDE 10 MG/1
10 TABLET ORAL
Status: DISCONTINUED | OUTPATIENT
Start: 2023-02-09 | End: 2023-02-28 | Stop reason: HOSPADM

## 2023-02-09 RX ORDER — DRONABINOL 2.5 MG/1
2.5 CAPSULE ORAL 2 TIMES DAILY
Status: DISCONTINUED | OUTPATIENT
Start: 2023-02-09 | End: 2023-02-20

## 2023-02-09 RX ORDER — OXYCODONE HYDROCHLORIDE 5 MG/1
5 TABLET ORAL
Status: DISCONTINUED | OUTPATIENT
Start: 2023-02-09 | End: 2023-02-28 | Stop reason: HOSPADM

## 2023-02-09 RX ORDER — ACETAMINOPHEN 500 MG
1000 TABLET ORAL EVERY 6 HOURS
Status: DISCONTINUED | OUTPATIENT
Start: 2023-02-09 | End: 2023-02-10

## 2023-02-09 RX ADMIN — FUROSEMIDE 60 MG: 40 TABLET ORAL at 09:02

## 2023-02-09 RX ADMIN — ACETAMINOPHEN 1000 MG: 500 TABLET ORAL at 05:02

## 2023-02-09 RX ADMIN — METHOCARBAMOL 1000 MG: 500 TABLET ORAL at 09:02

## 2023-02-09 RX ADMIN — DIAZEPAM 5 MG: 5 TABLET ORAL at 10:02

## 2023-02-09 RX ADMIN — HEPARIN SODIUM 5000 UNITS: 5000 INJECTION INTRAVENOUS; SUBCUTANEOUS at 09:02

## 2023-02-09 RX ADMIN — OXYCODONE HYDROCHLORIDE 10 MG: 10 TABLET ORAL at 09:02

## 2023-02-09 RX ADMIN — METHOCARBAMOL 1000 MG: 500 TABLET ORAL at 05:02

## 2023-02-09 RX ADMIN — OXYCODONE HYDROCHLORIDE 10 MG: 10 TABLET ORAL at 01:02

## 2023-02-09 RX ADMIN — Medication 6 MG: at 09:02

## 2023-02-09 RX ADMIN — HYDROMORPHONE HYDROCHLORIDE 0.2 MG: 1 INJECTION, SOLUTION INTRAMUSCULAR; INTRAVENOUS; SUBCUTANEOUS at 10:02

## 2023-02-09 RX ADMIN — ACETAMINOPHEN 1000 MG: 500 TABLET ORAL at 11:02

## 2023-02-09 RX ADMIN — LACTULOSE 20 G: 20 SOLUTION ORAL at 09:02

## 2023-02-09 RX ADMIN — GABAPENTIN 900 MG: 300 CAPSULE ORAL at 09:02

## 2023-02-09 RX ADMIN — LIDOCAINE HYDROCHLORIDE 15 ML: 20 SOLUTION ORAL; TOPICAL at 12:02

## 2023-02-09 RX ADMIN — HYDROMORPHONE HYDROCHLORIDE 0.5 MG: 1 INJECTION, SOLUTION INTRAMUSCULAR; INTRAVENOUS; SUBCUTANEOUS at 03:02

## 2023-02-09 RX ADMIN — Medication 10 ML: at 06:02

## 2023-02-09 RX ADMIN — Medication 10 ML: at 05:02

## 2023-02-09 RX ADMIN — DRONABINOL 2.5 MG: 2.5 CAPSULE ORAL at 09:02

## 2023-02-09 RX ADMIN — OXYCODONE HYDROCHLORIDE 10 MG: 10 TABLET ORAL at 11:02

## 2023-02-09 RX ADMIN — HYDROMORPHONE HYDROCHLORIDE 0.5 MG: 1 INJECTION, SOLUTION INTRAMUSCULAR; INTRAVENOUS; SUBCUTANEOUS at 09:02

## 2023-02-09 RX ADMIN — PANTOPRAZOLE SODIUM 40 MG: 40 TABLET, DELAYED RELEASE ORAL at 09:02

## 2023-02-09 RX ADMIN — BUPROPION HYDROCHLORIDE 300 MG: 300 TABLET, FILM COATED, EXTENDED RELEASE ORAL at 09:02

## 2023-02-09 RX ADMIN — HEPARIN SODIUM 5000 UNITS: 5000 INJECTION INTRAVENOUS; SUBCUTANEOUS at 01:02

## 2023-02-09 RX ADMIN — DIAZEPAM 5 MG: 5 TABLET ORAL at 09:02

## 2023-02-09 RX ADMIN — MEROPENEM 2 G: 1 INJECTION INTRAVENOUS at 05:02

## 2023-02-09 RX ADMIN — HYDROMORPHONE HYDROCHLORIDE 0.2 MG: 1 INJECTION, SOLUTION INTRAMUSCULAR; INTRAVENOUS; SUBCUTANEOUS at 06:02

## 2023-02-09 RX ADMIN — PROCHLORPERAZINE EDISYLATE 2.5 MG: 5 INJECTION INTRAMUSCULAR; INTRAVENOUS at 03:02

## 2023-02-09 RX ADMIN — ACETAMINOPHEN 1000 MG: 500 TABLET ORAL at 09:02

## 2023-02-09 RX ADMIN — GABAPENTIN 600 MG: 300 CAPSULE ORAL at 09:02

## 2023-02-09 RX ADMIN — SPIRONOLACTONE 25 MG: 25 TABLET, FILM COATED ORAL at 09:02

## 2023-02-09 RX ADMIN — MEROPENEM 2 G: 1 INJECTION INTRAVENOUS at 09:02

## 2023-02-09 RX ADMIN — HEPARIN SODIUM 5000 UNITS: 5000 INJECTION INTRAVENOUS; SUBCUTANEOUS at 05:02

## 2023-02-09 RX ADMIN — Medication 10 ML: at 11:02

## 2023-02-09 RX ADMIN — FUROSEMIDE 60 MG: 40 TABLET ORAL at 05:02

## 2023-02-09 RX ADMIN — ONDANSETRON 4 MG: 2 INJECTION INTRAMUSCULAR; INTRAVENOUS at 02:02

## 2023-02-09 RX ADMIN — MEROPENEM 2 G: 1 INJECTION INTRAVENOUS at 01:02

## 2023-02-09 RX ADMIN — GABAPENTIN 900 MG: 300 CAPSULE ORAL at 03:02

## 2023-02-09 RX ADMIN — METHOCARBAMOL 1000 MG: 500 TABLET ORAL at 01:02

## 2023-02-09 RX ADMIN — ALUMINUM HYDROXIDE, MAGNESIUM HYDROXIDE, AND SIMETHICONE 30 ML: 200; 200; 20 SUSPENSION ORAL at 12:02

## 2023-02-09 RX ADMIN — OXYCODONE HYDROCHLORIDE 10 MG: 10 TABLET ORAL at 05:02

## 2023-02-09 NOTE — ASSESSMENT & PLAN NOTE
Pt is 42F multiple spinal surgeries and revisions last T10- Pelvis in March 2022 who presented to OSH with concern for shoulder infection and UTI found to have E coli sepsis who has had progressive worsening BLE weakness. 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 Transferred to [facility] to  and neurosurgery was consulted.    OR 1/24 for temporary T6-12 PSIF. Taz pus noted intraoperatively.   OR 2/2 for T4-pelvis revision and extension of fusion with T9-10 corpectomy with plastic surgery assistance.  Maps goals > 85 completed 2/5 in ICU.    Plan:  --Stepped down to  floor.   -q4h neurochecks.  --All labs & diagnostics reviewed.   -post op xrays pending. Obtain upright when pt able to stand or sit, prior to discharge.  --TLSO to be worn when OOB only.  --MIHIR drains x4, monitor output qshift. Keep to gravity. Monitor H/H. Transfuse for Hg <7.0 or for symptomatic anemia with Hg < 8.0.  --Plastic surgery managing incision. Open to air.   - Off-load pressure as much as possible to promote wound healing.   - Elevate with wedge, alternate sides Q2H  --Thoracic discitis: BCx 1/20 no growth. OR cultures 1/24 grew ESBL E. coli   -- ID consulted. Recs for IV meropenem with estimated end date of 3/29/23.  --Pain Control: Continue multimodal regimen. PCA pump discontinued 2/8 with poor pain control subsequently.    - Anesthesia Pain Service consulted due to refractory pain, appreciate assistance   - No NSAIDs as pt s/p recent instrumented fusion  --DVT PPx: SQH/TEDs/SCDs.  --NSGY will continue to follow. Please contact team with any further questions.

## 2023-02-09 NOTE — PROGRESS NOTES
Plastic and Reconstructive Surgery   Progress Note    Subjective:    No acute events, afebrile. Not on pressors.   Drains are still very high output. SS.      Objective:  Vital signs in last 24 hours:  Temp:  [97.9 °F (36.6 °C)-98.7 °F (37.1 °C)] 98.4 °F (36.9 °C)  Pulse:  [] 110  Resp:  [14-19] 19  SpO2:  [96 %-100 %] 97 %  BP: ()/(51-63) 97/52    Intake/Output last 3 shifts:  I/O last 3 completed shifts:  In: 1520 [P.O.:1520]  Out: 2920 [Urine:950; Drains:1970]    Intake/Output this shift:  No intake/output data recorded.        Physical Exam:  VITAL SIGNS:   Vitals:    23 1126 23 1315 23 1504 23 1515   BP:    (!) 97/52   BP Location:    Right arm   Patient Position:    Lying   Pulse: (!) 115   110   Resp:  18 18 19   Temp:    98.4 °F (36.9 °C)   TempSrc:    Oral   SpO2:    97%   Weight:       Height:         TMAX: Temp (24hrs), Av.2 °F (36.8 °C), Min:97.9 °F (36.6 °C), Max:98.7 °F (37.1 °C)    General: Alert; No acute distress  Cardiovascular: Regular rate   Respiratory: Normal respiratory effort. Chest rise symmetric.   Abdomen: Soft, nontender, nondistended  Extremity: Moves all extremities equally.  Neurologic: No focal deficit. Speech normal  Spine incision is intact. Staples still in place.no signs of infection      Scheduled Medications acetaminophen, 1,000 mg, Q6H  bisacodyL, 10 mg, Every other day  buPROPion, 300 mg, Daily  dronabinoL, 2.5 mg, BID  furosemide, 60 mg, BID  gabapentin, 900 mg, TID  heparin (porcine), 5,000 Units, Q8H  lactulose, 20 g, TID  meropenem (MERREM) IVPB, 2 g, Q8H  methocarbamoL, 1,000 mg, QID  pantoprazole, 40 mg, Daily  sodium chloride 0.9%, 10 mL, Q6H  spironolactone, 25 mg, Daily      PRN Medications sodium chloride, sodium chloride, dextrose 10%, dextrose 10%, diazePAM, glucagon (human recombinant), glucose, glucose, hydrALAZINE, HYDROmorphone, magnesium hydroxide 400 mg/5 ml, melatonin, naloxone, ondansetron, oxyCODONE **AND**  oxyCODONE, polyethylene glycol, prochlorperazine, senna-docusate 8.6-50 mg, sodium chloride 0.9%, Flushing PICC Protocol **AND** sodium chloride 0.9% **AND** sodium chloride 0.9%    Recent Labs:   Lab Results   Component Value Date    WBC 4.99 02/09/2023    HGB 8.1 (L) 02/09/2023    HCT 26.2 (L) 02/09/2023    MCV 95 02/09/2023     (L) 02/09/2023     Lab Results   Component Value Date     (H) 02/09/2023     (L) 02/09/2023    K 4.2 02/09/2023    CL 99 02/09/2023    BUN 21 (H) 02/09/2023         Assessment: 42 y.o. y/o female 7 Days Post-Op s/p Procedure(s):  LAMINECTOMY, SPINE, THORACIC, WITH FUSION (T4-Pelvis fusion w/ T9-10 corpectomies) **AIRO Doing well postoperatively.  Drains with SSO , still with high volume    Plan  -Continue to monitor output  -incisional vac discontinued  -Rest of care per primary

## 2023-02-09 NOTE — PT/OT/SLP PROGRESS
"Physical Therapy Treatment    Patient Name:  Rachel Zazueta   MRN:  [id number]    Recommendations:     Discharge Recommendations: nursing facility, skilled  Discharge Equipment Recommendations: hospital bed, lift device  Barriers to discharge: Inaccessible home and Decreased caregiver support    Assessment:     Rachel Zazueta is a 42 y.o. female admitted with a medical diagnosis of Weakness of both lower extremities.  She presents with the following impairments/functional limitations: weakness, impaired endurance, impaired self care skills, impaired functional mobility, impaired balance, decreased upper extremity function, decreased lower extremity function, decreased safety awareness, pain, decreased ROM, impaired skin, edema, impaired cardiopulmonary response to activity, orthopedic precautions.    Rehab Prognosis: Good; patient would benefit from acute skilled PT services to address these deficits and reach maximum level of function.    Recent Surgery: Procedure(s) (LRB):  LAMINECTOMY, SPINE, THORACIC, WITH FUSION (T4-Pelvis fusion w/ T9-10 corpectomies) **AIRO (N/A) 7 Days Post-Op    Plan:     During this hospitalization, patient to be seen 3 x/week to address the identified rehab impairments via therapeutic activities, therapeutic exercises, neuromuscular re-education, wheelchair management/training and progress toward the following goals:    Plan of Care Expires:  03/05/23    Subjective     Chief Complaint: pain   Patient/Family Comments/goals: "I really tried, I promise I tried. My stomach just hurts, I can't come forward."  Pain/Comfort:  Pain Rating 1: 10/10  Location - Orientation 1: upper  Location 1: abdomen  Pain Addressed 1: Pre-medicate for activity, Reposition, Distraction, Cessation of Activity, Nurse notified  Pain Rating Post-Intervention 1: 10/10      Objective:     Communicated with RN prior to session.  Patient found HOB elevated with telemetry, peripheral IV, PureWick, MIHIR drain upon PTA " entry to room.   Therapy technician present throughout to assist with mobility.     General Precautions: Standard, fall, contact  Orthopedic Precautions: spinal precautions  Braces: TLSO  Respiratory Status: Room air     Functional Mobility:   Bed Mobility:     Rolling Left:  maximal assistance and of 2 persons  Supine to Sit: total assistance and of 2 persons  Sit to Supine: total assistance and of 2 persons      AM-PAC 6 CLICK MOBILITY  Turning over in bed (including adjusting bedclothes, sheets and blankets)?: 2  Sitting down on and standing up from a chair with arms (e.g., wheelchair, bedside commode, etc.): 1  Moving from lying on back to sitting on the side of the bed?: 1  Moving to and from a bed to a chair (including a wheelchair)?: 1  Need to walk in hospital room?: 1  Climbing 3-5 steps with a railing?: 1  Basic Mobility Total Score: 7       Treatment & Education:  Pt requires increased time for all functional mobility d/t pain and anxiety. Pt with fear/anticipation of pain with mobility.   Pt at EOB for less than 5 minutes with total of 2 persons and pt blocked to prevent hips from sliding forward off of bed. Pt with forceful posterior lean and requires emergent return to side lying to prevent fall from bed. Pt repositioned for pain relief and session terminated.     Patient left HOB elevated with all lines intact, call button in reach, and RN notified..    GOALS:   Multidisciplinary Problems       Physical Therapy Goals          Problem: Physical Therapy    Goal Priority Disciplines Outcome Goal Variances Interventions   Physical Therapy Goal     PT, PT/OT Ongoing, Progressing     Description: Goals to be met by: 23     Patient will increase functional independence with mobility by performin. Supine to sit with Moderate Assistance  2. Sit to supine with Moderate Assistance  3. Rolling to Left and Right with Moderate Assistance.  4. Sit to stand transfer with Maximum Assistance                          Time Tracking:     PT Received On: 02/09/23  PT Start Time: 1034     PT Stop Time: 1100  PT Total Time (min): 26 min     Billable Minutes: Therapeutic Activity 26    Treatment Type: Treatment  PT/PTA: PTA     PTA Visit Number: 2     02/09/2023

## 2023-02-09 NOTE — ASSESSMENT & PLAN NOTE
MELD-Na score: 11 at 2/8/2023  4:55 AM  MELD score: 11 at 2/8/2023  4:55 AM  Calculated from:  Serum Creatinine: 0.5 mg/dL (Using min of 1 mg/dL) at 2/8/2023  4:55 AM  Serum Sodium: 131 mmol/L at 2/8/2023  4:55 AM  Total Bilirubin: 1.6 mg/dL at 2/8/2023  4:55 AM  INR(ratio): 1.3 at 2/6/2023 10:47 AM  Age: 42 years      Patient has reportedly refused transplant evaluation in the past.   - Daily CMP and trend LFTs  - Continue Lactulose 20 g TID  - Lasix 40 mg PO BID  - Will need Hepatology follow up outpatient

## 2023-02-09 NOTE — ASSESSMENT & PLAN NOTE
42 year old woman s/p multiple spinal surgeries presented to OSH with possible infection of shoulder. Found to have UTI and E. Coli bacteremia and progressively worse BLE weakness. Transferred to [facility] for neurosurgical evaluation. Underwent Laminectomy T8-T10; Posterior spinal fusion T8-T12; hardware removal and washout on 1/24. Admitted to [facility] postop. On 2/2 underwent T4-pelvis fusion and T9-T10 corpectomies. To complete 8 week course of meropenem per ID for ESBL E coli from bone culture, end date 3/29.     - neuro checks q4h  - HV drains x 2 in place, management per NSGY  - meropenem for ESBL bacteremia per ID, eot 3/29/23  - Plastic surgery follow, wound vac discontinued  - CMP, Mag, Phos, CBC daily  - MAP goals >65, SBP < 180  - PRN labetalol, hydralazine  - MM pain regimen: PRN Dilaudid, Tylenol, gabapentin, robaxin, PRN oxycodone  - Aggressive bowel regimen  - PT/OT   2/9   On meropenem for MDR-ESCHERICHIA COLI ESBL  sodium trended down to 129.  Neurosurgery following post OR and aiding in pain management. PCA discontinued 2/8 . On oxycodone 10mg PO q4h . requiring IV dilaudid q4 as needed,. drains to gravity output 990ml/24h . Plastic surgery following flap, managing wounds.  Wound VAC discontinued on Wednesday.  accepted to [facility]

## 2023-02-09 NOTE — NURSING
Notified MD regarding pt c/o severe sharp pain, underneath rt rib cage. New orders received. Troponin drawn, EKG ordered and order for GI cocktail.

## 2023-02-09 NOTE — PROGRESS NOTES
[facility] - Telemetry [facility] Medicine  Progress Note    Patient Name: Rachel Zazueta  MRN: [id number]  Patient Class: IP- Inpatient   Admission Date: 1/19/2023  Length of Stay: 20 days  Attending Physician: Danny Ding MD  Primary Care Provider: Dannielle Merino DO        Subjective:     Principal Problem:Weakness of both lower extremities        HPI:  Ms. Zazueta is a 42 y.o F w/ hx ofIV drug use (methamphetamine), chronic HCV with cirrhosis, spinal fusion (04/2021), epidural abscess with removal of hardware (02/2022), spinal fusion with hardware (T10 - Pelvis / 03/2022), obesity (BMI 39.3) and neurogenic bladder who initially presented to [facility] for evaluation of back pain and left leg weakness.  She reports initially noting the left leg weakness when she was about to go to the bathroom and was about to fall but was assisted by her boyfriend.  She was brought to the ED via EMS.  Urinalysis with UTI concerns and blood cultures at OSH grew ESBL E coli so she was treated with meropenem.  During hospitalization, she reports the weakness that started out in her left leg initially then spread upwards to her left thigh, left hip, then crossed over to the right hip and spread to her right leg.  Denies recent IV drug use. She reports her last incidence of drug use was more than 3 years ago and also denies tobacco, and alcohol abuse.  Reports cannabis use.    She also reports more than 10 years ago she had an episode of a headache that lasted about 4 days and was associated with residual defects of a strabismus in the left eye with associated visual disturbances.  She also reports over the past few years her friends have noticed that she sometimes has word-finding difficulty during conversations.     Reports shortness of breath when sitting up, fever, chills, back pain, lower extremity weakness(initally L>R, but now R>L), diarrhea.  Denies cough, nausea, vomiting, constipation, bladder or  bowel incontinence, dysuria, dark urine, new vision changes.    OSH course notable for concerning urinalysis and blood cultures positive for ESBL E coli treated with meropenem.  She was also admit to the ICU where she was treated with Precedex for significant body aches, irritability, and muscle spasms.  Concerns of a murmur on physical exam so she underwent TTE which did not show any vegetations.  Worsening lower extremity weakness workup with MRI head nonspecific, MRI cervical spine with multilevel spondylosis, X-ray spine with multilevel thoracolumbar fusion and diskectomy, focal kyphosis at T9-10 increased compared to prior.  She was started on prophylaxis amoxicillin due to her history of infected hardware.  MRI spine unable to be completed due to patient's body habitus so she was transferred to [facility] for further imaging and Neurosurgery, Neurology evaluation.      Overview/Hospital Course:  Pt admitted as a transfer from [facility] [place] for worsening LE weakness, concern for spinal infection, and need for MRI which could not be done at OSH. Imaging was completed here. Worsening proximal junctional kyphosis noted at T9-10 noted with concern for discitis at T8-9, T9-10. NSGY evaluated. Plan for OR Tuesday 1/24 for hardware removal, washout, and re-instrumentation/extension of fusion to T4.      Interval History:   Transferred from neuro ICU yesterday.  Patient with continued back pain.  On multimodal pain management from ICU.  PCA discontinued today.  Neurosurgery and plastic surgery following.  Patient with no other complaints.  Awaiting disposition to LTAC.      Review of Systems   Constitutional:  Positive for activity change. Negative for appetite change, chills, diaphoresis, fatigue and fever.   HENT:  Negative for rhinorrhea and sore throat.    Respiratory:  Negative for cough, chest tightness and shortness of breath.    Cardiovascular:  Positive for leg swelling. Negative for chest pain and palpitations.    Gastrointestinal:  Negative for abdominal distention, abdominal pain, blood in stool, constipation, diarrhea and nausea.   Endocrine: Negative for cold intolerance.   Genitourinary:  Negative for decreased urine volume and dysuria.   Musculoskeletal:  Positive for back pain. Negative for arthralgias and myalgias.   Skin:  Positive for wound (Surgical, back). Negative for rash.   Neurological:  Positive for weakness. Negative for dizziness, numbness and headaches.   Psychiatric/Behavioral:  Negative for agitation, behavioral problems and confusion.    Objective:     Vital Signs (Most Recent):  Temp: 99.3 °F (37.4 °C) (02/08/23 1501)  Pulse: (!) 113 (02/08/23 1542)  Resp: 14 (02/08/23 1754)  BP: (!) 111/56 (02/08/23 1501)  SpO2: 97 % (02/08/23 1501)   Vital Signs (24h Range):  Temp:  [98 °F (36.7 °C)-99.3 °F (37.4 °C)] 99.3 °F (37.4 °C)  Pulse:  [111-116] 113  Resp:  [13-18] 14  SpO2:  [92 %-100 %] 97 %  BP: (103-120)/(55-67) 111/56     Weight: 132 kg (291 lb 0.1 oz)  Body mass index is 45.58 kg/m².    Intake/Output Summary (Last 24 hours) at 2/8/2023 1808  Last data filed at 2/8/2023 1457  Gross per 24 hour   Intake 1400 ml   Output 2210 ml   Net -810 ml      Physical Exam  Vitals and nursing note reviewed.   Constitutional:       General: She is not in acute distress.  HENT:      Head: Normocephalic and atraumatic.      Nose: Nose normal. No rhinorrhea.      Mouth/Throat:      Mouth: Mucous membranes are moist.      Pharynx: Oropharynx is clear.   Eyes:      General: No scleral icterus.        Right eye: No discharge.         Left eye: No discharge.   Cardiovascular:      Rate and Rhythm: Regular rhythm. Tachycardia present.   Pulmonary:      Effort: Pulmonary effort is normal. No respiratory distress.   Abdominal:      General: There is distension.      Tenderness: There is no abdominal tenderness.   Musculoskeletal:      Right lower leg: Edema present.      Left lower leg: Edema present.   Skin:     General:  Skin is warm and dry.   Neurological:      Mental Status: She is alert and oriented to person, place, and time.      Comments: BUE 5/5 BLE 2/5       Significant Labs: All pertinent labs within the past 24 hours have been reviewed.  CBC:   Recent Labs   Lab 02/06/23  1916 02/07/23  0357 02/08/23  0455   WBC 6.05 6.11 5.54   HGB 7.7* 7.6* 7.7*   HCT 25.0* 24.8* 25.4*   * 121* 122*     CMP:   Recent Labs   Lab 02/07/23  0357 02/08/23  0455   * 131*   K 4.0 4.0    101   CO2 23 23   * 97   BUN 18 20   CREATININE 0.5 0.5   CALCIUM 7.6* 7.4*   PROT 4.7* 4.9*   ALBUMIN 1.9* 1.9*   BILITOT 1.9* 1.6*   ALKPHOS 130 128   AST 39 46*   ALT 28 31   ANIONGAP 6* 7*       Significant Imaging: I have reviewed all pertinent imaging results/findings within the past 24 hours.      Assessment/Plan:      * Weakness of both lower extremities  42 year old woman s/p multiple spinal surgeries presented to OSH with possible infection of shoulder. Found to have UTI and E. Coli bacteremia and progressively worse BLE weakness. Transferred to [facility] for neurosurgical evaluation. Underwent Laminectomy T8-T10; Posterior spinal fusion T8-T12; hardware removal and washout on 1/24. Admitted to [facility] postop. On 2/2 underwent T4-pelvis fusion and T9-T10 corpectomies. To complete 8 week course of meropenem per ID for ESBL E coli from bone culture, end date 3/29.     - neuro checks q4h  - HV drains x 2 in place, management per NSGY  - meropenem for ESBL bacteremia per ID, eot 3/29/23  - Plastic surgery follow, wound vac discontinued  - CMP, Mag, Phos, CBC daily  - MAP goals >65, SBP < 180  - PRN labetalol, hydralazine  - MM pain regimen: PRN Dilaudid, Tylenol, gabapentin, robaxin, PRN oxycodone  - Aggressive bowel regimen  - PT/OT     Anxiety and depression  Evaluated by Psychiatry at OSH prior to transfer. Recommended increasing bupropion.  - Continue bupropion 300 mg QD  - Gabapentin 600 mg TID for anxiety and neuropathy  - PRN  diazepam 5 mg q6h      Acute blood loss anemia  Hb 6.4 on 2/6, received 1U pRBCs. Hb 2/7 WNL.  - monitor Hb  - transfuse to maintain Hb >7      Cirrhosis of liver with ascites  MELD-Na score: 11 at 2/8/2023  4:55 AM  MELD score: 11 at 2/8/2023  4:55 AM  Calculated from:  Serum Creatinine: 0.5 mg/dL (Using min of 1 mg/dL) at 2/8/2023  4:55 AM  Serum Sodium: 131 mmol/L at 2/8/2023  4:55 AM  Total Bilirubin: 1.6 mg/dL at 2/8/2023  4:55 AM  INR(ratio): 1.3 at 2/6/2023 10:47 AM  Age: 42 years      Patient has reportedly refused transplant evaluation in the past.   - Daily CMP and trend LFTs  - Continue Lactulose 20 g TID  - Lasix 40 mg PO BID  - Will need Hepatology follow up outpatient    Kyphosis of thoracolumbar region  See weakness    Thoracic discitis  See Weakness of both lower extremities    Substance use disorder  Denies IV drug use (meth) for past 3 years. Denies alcohol and tobacco abuse.     Chronic hepatitis C with cirrhosis  See above      VTE Risk Mitigation (From admission, onward)         Ordered     heparin (porcine) injection 5,000 Units  Every 8 hours         02/04/23 0848     IP VTE HIGH RISK PATIENT  Once         01/19/23 2153     Place sequential compression device  Until discontinued         01/19/23 2153     Reason for No Pharmacological VTE Prophylaxis  Once        Question:  Reasons:  Answer:  Physician Provided (leave comment)  Comment:  Potential NSGY procedure    01/19/23 2153                Discharge Planning   SHIRA: 2/10/2023     Code Status: Partial Code   Is the patient medically ready for discharge?: No    Reason for patient still in hospital (select all that apply): Patient trending condition, Laboratory test, Treatment, Consult recommendations and Pending disposition  Discharge Plan A: Long-term acute care facility (LTAC)                  Danny Ding MD  Department of Hospital Medicine   [facility] - Telemetry Stepdown

## 2023-02-09 NOTE — ADDENDUM NOTE
Addendum  created 02/09/23 1408 by Geovani Richter MD    Order list changed, Pharmacy for encounter modified

## 2023-02-09 NOTE — PROGRESS NOTES
[facility] - Telemetry [facility] Medicine  Progress Note    Patient Name: Rachel Zazueta  MRN: [id number]  Patient Class: IP- Inpatient   Admission Date: 1/19/2023  Length of Stay: 21 days  Attending Physician: Vincent Bal MD  Primary Care Provider: Dannielle Merino DO        Subjective:     Principal Problem:Weakness of both lower extremities        HPI:  Ms. Zazueta is a 42 y.o F w/ hx ofIV drug use (methamphetamine), chronic HCV with cirrhosis, spinal fusion (04/2021), epidural abscess with removal of hardware (02/2022), spinal fusion with hardware (T10 - Pelvis / 03/2022), obesity (BMI 39.3) and neurogenic bladder who initially presented to [facility] for evaluation of back pain and left leg weakness.  She reports initially noting the left leg weakness when she was about to go to the bathroom and was about to fall but was assisted by her boyfriend.  She was brought to the ED via EMS.  Urinalysis with UTI concerns and blood cultures at OSH grew ESBL E coli so she was treated with meropenem.  During hospitalization, she reports the weakness that started out in her left leg initially then spread upwards to her left thigh, left hip, then crossed over to the right hip and spread to her right leg.  Denies recent IV drug use. She reports her last incidence of drug use was more than 3 years ago and also denies tobacco, and alcohol abuse.  Reports cannabis use.    She also reports more than 10 years ago she had an episode of a headache that lasted about 4 days and was associated with residual defects of a strabismus in the left eye with associated visual disturbances.  She also reports over the past few years her friends have noticed that she sometimes has word-finding difficulty during conversations.     Reports shortness of breath when sitting up, fever, chills, back pain, lower extremity weakness(initally L>R, but now R>L), diarrhea.  Denies cough, nausea, vomiting, constipation, bladder or  bowel incontinence, dysuria, dark urine, new vision changes.    OSH course notable for concerning urinalysis and blood cultures positive for ESBL E coli treated with meropenem.  She was also admit to the ICU where she was treated with Precedex for significant body aches, irritability, and muscle spasms.  Concerns of a murmur on physical exam so she underwent TTE which did not show any vegetations.  Worsening lower extremity weakness workup with MRI head nonspecific, MRI cervical spine with multilevel spondylosis, X-ray spine with multilevel thoracolumbar fusion and diskectomy, focal kyphosis at T9-10 increased compared to prior.  She was started on prophylaxis amoxicillin due to her history of infected hardware.  MRI spine unable to be completed due to patient's body habitus so [place] [facility] [place], Neurology evaluation.      Overview/Hospital Course:  Pt admitted as a transfer from [facility] for worsening LE weakness, concern for spinal infection, and need for MRI which could not be done at OSH. Imaging was completed here. Worsening proximal junctional kyphosis noted at T9-10 noted with concern for discitis at T8-9, T9-10. NSGY evaluated.     Found to have T8-T10 kyphosis on MRI. 1/24 underwent laminectomy T8-T10; posterior fusion T8-T12; and washout. 2/2 underwent T4-pelvis fusion and T9-T10 corpectomy.  ID consulted for thoracic discitis infection.  Patient to complete 8 weeks of therapy with meropenem. Patient underwent flap closure with Plastic surgery.  Step-down Hospital Medicine on 02/08.    2/9   On meropenem for MDR-ESCHERICHIA COLI ESBL  sodium trended down to 129.  Neurosurgery following post OR and aiding in pain management. PCA discontinued 2/8 . On oxycodone 10mg PO q4h . requiring IV dilaudid q4 as needed,. drains to gravity output 990ml/24h . Plastic surgery following flap, managing wounds.  Wound VAC discontinued on Wednesday.  accepted to [facility] pain  management consulted for back ache- switched to multimodal therapy. sodium trended down to 127. nephrology consulted         Review of Systems:   Pain scale:  Constitutional:  fever,  chills, headache, vision loss, hearing loss, weight loss, Generalized weakness, falls, loss of smell, loss of taste, poor appetite,  sore throat  Respiratory: cough, shortness of breath.   Cardiovascular: chest pain, dizziness, palpitations, orthopnea, swelling of feet, syncope  Gastrointestinal: nausea, vomiting, abdominal pain, diarrhea, black stool,  blood in stool, change in bowel habits  Genitourinary: hematuria, dysuria, urgency, frequency  Integument/Breast: rash,  pruritis,  Surgical wound - back   Hematologic/Lymphatic: easy bruising, lymphadenopathy  Musculoskeletal: arthralgias , myalgias, back pain, neck pain, knee pain  Neurological: confusion, seizures, tremors, slurred speech  Behavioral/Psych:  depression, anxiety, auditory or visual hallucinations     OBJECTIVE:     Physical Exam:  Body mass index is 49.2 kg/m².    Constitutional: Appears well-developed and well-nourished. severe obesit y  Head: Normocephalic and atraumatic.   Neck: Normal range of motion. Neck supple.   Cardiovascular: Normal heart rate.  Regular heart rhythm.  Pulmonary/Chest: Effort normal.   Abdominal:+  distension.  No tenderness  Musculoskeletal: Normal range of motion. ++ edema. drains in place   Neurological: Alert and oriented to person, place, and time. LE strength - barely able to lift LE off the bed   Skin: Skin is warm and dry.   Psychiatric: Normal mood and affect. Behavior is normal.                  Vital Signs  Temp: 98.4 °F (36.9 °C) (02/09/23 1515)  Pulse: 110 (02/09/23 1515)  Resp: 16 (02/09/23 1740)  BP: (Abnormal) 97/52 (02/09/23 1515)  SpO2: 97 % (02/09/23 1515)     24 Hour VS Range    Temp:  [97.9 °F (36.6 °C)-98.7 °F (37.1 °C)]   Pulse:  []   Resp:  [16-19]   BP: ()/(51-63)   SpO2:  [96 %-100 %]     Intake/Output  Summary (Last 24 hours) at 2/9/2023 1907  Last data filed at 2/9/2023 1638  Gross per 24 hour   Intake 120 ml   Output 1035 ml   Net -915 ml         I/O This Shift:  No intake/output data recorded.    Wt Readings from Last 3 Encounters:   02/09/23 (Abnormal) 142.5 kg (314 lb 2.5 oz)   01/19/23 110.6 kg (243 lb 12.8 oz)   01/19/23 110.6 kg (243 lb 13.3 oz)       I have personally reviewed the vitals and recorded Intake/Output     Laboratory/Diagnostic Data:    CBC/Anemia Labs: Coags:    Recent Labs   Lab 02/07/23  0357 02/08/23  0455 02/09/23  0356   WBC 6.11 5.54 4.99   HGB 7.6* 7.7* 8.1*   HCT 24.8* 25.4* 26.2*   * 122* 114*   MCV 95 96 95   RDW 21.1* 21.2* 21.1*    Recent Labs   Lab 02/06/23  1047   INR 1.3*   APTT 36.6*        Chemistries: ABG:   Recent Labs   Lab 02/07/23  0357 02/08/23  0455 02/09/23  0356 02/09/23  1700   * 131* 129* 127*   K 4.0 4.0 4.2 4.0    101 99 98   CO2 23 23 24 23   BUN 18 20 21* 22*   CREATININE 0.5 0.5 0.6 0.5   CALCIUM 7.6* 7.4* 7.6* 7.2*   PROT 4.7* 4.9* 5.1*  --    BILITOT 1.9* 1.6* 1.7*  --    ALKPHOS 130 128 149*  --    ALT 28 31 31  --    AST 39 46* 42*  --    MG 1.8 1.9 1.9  --    PHOS 2.7 3.0 2.8 2.8    No results for input(s): PH, PCO2, PO2, HCO3, POCSATURATED, BE in the last 168 hours.       POCT Glucose: HbA1c:    No results for input(s): POCTGLUCOSE in the last 168 hours. Hemoglobin A1C   Date Value Ref Range Status   04/16/2021 4.6 4.0 - 5.6 % Final     Comment:     ADA Screening Guidelines:  5.7-6.4%  Consistent with prediabetes  >or=6.5%  Consistent with diabetes    High levels of fetal hemoglobin interfere with the HbA1C  assay. Heterozygous hemoglobin variants (HbS, HgC, etc)do  not significantly interfere with this assay.   However, presence of multiple variants may affect accuracy.     05/14/2019 4.2 4.0 - 5.6 % Final     Comment:     ADA Screening Guidelines:  5.7-6.4%  Consistent with prediabetes  >or=6.5%  Consistent with diabetes  High levels  of fetal hemoglobin interfere with the HbA1C  assay. Heterozygous hemoglobin variants (HbS, HgC, etc)do  not significantly interfere with this assay.   However, presence of multiple variants may affect accuracy.     11/24/2018 4.2 4.0 - 5.6 % Final     Comment:     ADA Screening Guidelines:  5.7-6.4%  Consistent with prediabetes  >or=6.5%  Consistent with diabetes  High levels of fetal hemoglobin interfere with the HbA1C  assay. Heterozygous hemoglobin variants (HbS, HgC, etc)do  not significantly interfere with this assay.   However, presence of multiple variants may affect accuracy.          Cardiac Enzymes: Ejection Fractions:    Recent Labs     02/08/23  2258   TROPONINI <0.006    EF   Date Value Ref Range Status   01/20/2023 58 % Final   12/23/2022 64 % Final          No results for input(s): COLORU, APPEARANCEUA, PHUR, SPECGRAV, PROTEINUA, GLUCUA, KETONESU, BILIRUBINUA, OCCULTUA, NITRITE, UROBILINOGEN, LEUKOCYTESUR, RBCUA, WBCUA, BACTERIA, SQUAMEPITHEL, HYALINECASTS in the last 48 hours.    Invalid input(s): WRIGHTSUR    Procalcitonin (ng/mL)   Date Value   02/14/2022 0.05   02/14/2022 0.06   04/14/2021 0.04   11/24/2018 0.37 (H)     Lactate (Lactic Acid) (mmol/L)   Date Value   01/10/2023 2.0   12/23/2022 1.8   12/23/2022 3.6 (HH)   11/14/2022 1.4   02/14/2022 1.1     BNP (pg/mL)   Date Value   11/24/2018 90   03/14/2017 87     CRP   Date Value   01/20/2023 9.4 mg/L (H)   01/10/2023 3.47 mg/dL (H)   11/16/2022 45.9 mg/L (H)   11/14/2022 11.10 mg/dL (H)   07/23/2022 1.93 mg/dL (H)   07/01/2022 1.53 mg/dL (H)   02/24/2022 7.1 mg/L   02/14/2022 29.2 mg/L (H)   02/14/2022 30.8 mg/L (H)   04/14/2021 47.5 mg/L (H)     Sed Rate (mm/Hr)   Date Value   01/20/2023 70 (H)   01/10/2023 55 (H)   11/16/2022 84 (H)   11/14/2022 67 (H)   02/24/2022 8   02/14/2022 57 (H)   02/14/2022 82 (H)   04/14/2021 57 (H)   05/13/2019 35 (H)   03/21/2017 21 (H)     No results found for: DDIMER  Ferritin (ng/mL)   Date Value   02/07/2022  126   03/21/2017 52   03/14/2017 67   12/06/2016 80     LD (U/L)   Date Value   04/24/2021 250   03/14/2017 253     Troponin I (ng/mL)   Date Value   02/08/2023 <0.006   11/24/2018 <0.006   03/20/2017 0.01   03/14/2017 <0.01     CPK (U/L)   Date Value   01/20/2023 48   02/14/2022 64   03/15/2017 44     No results found for this or any previous visit.  SARS-CoV2 (COVID-19) Qualitative PCR (no units)   Date Value   03/14/2022 Not Detected   02/20/2022 Not Detected   04/27/2021 Not Detected   04/22/2021 Not Detected     SARS-CoV-2 RNA, Amplification, Qual (no units)   Date Value   03/02/2022 Negative     POC Rapid COVID (no units)   Date Value   12/23/2022 Negative   12/10/2022 Negative   02/14/2022 Negative   04/14/2021 Negative       Microbiology labs for the last week  Microbiology Results (last 7 days)       Procedure Component Value Units Date/Time    Fungus culture [[id number]] Collected: 01/24/23 0913    Order Status: Completed Specimen: Body Fluid from Back Updated: 02/08/23 0655     Fungus (Mycology) Culture Culture in progress      No fungus isolated after 2 weeks    Narrative:      1) Perispinal    Fungus culture [[id number]] Collected: 01/24/23 0943    Order Status: Completed Specimen: Bone from Back Updated: 02/08/23 0655     Fungus (Mycology) Culture Culture in progress      No fungus isolated after 2 weeks    Narrative:      3) Perispinal    Fungus culture [[id number]] Collected: 01/24/23 0943    Order Status: Completed Specimen: Bone from Back Updated: 02/08/23 0655     Fungus (Mycology) Culture Culture in progress      No fungus isolated after 2 weeks    Narrative:      4) Perispinal    Fungus culture [[id number]] Collected: 01/24/23 0913    Order Status: Completed Specimen: Body Fluid from Back Updated: 02/08/23 0655     Fungus (Mycology) Culture Culture in progress      No fungus isolated after 2 weeks    Narrative:      2) Perispinal            Reviewed and noted in plan where applicable- Please see  chart for full lab data.    Lines/Drains:  PICC Double Lumen 01/26/23 1209 left basilic (Active)   Line Necessity Review Longterm central access required 02/08/23 1948   Verification by X-ray Yes 02/08/23 1948   Site Assessment No warmth;No swelling 02/08/23 1948   Extremity Assessment Distal to IV No abnormal discoloration 02/08/23 1948   Line Securement Device Secured with sutureless device 02/08/23 0730   Dressing Type Biopatch in place 02/08/23 1948   Dressing Status Clean;Dry;Intact 02/08/23 1948   Dressing Intervention Integrity maintained 02/08/23 1948   Date on Dressing 02/06/23 02/08/23 1948   Dressing Due to be Changed 02/13/23 02/08/23 1948   Distal Patency/Care flushed w/o difficulty 02/08/23 1948   Proximal 1 Patency/Care flushed w/o difficulty 02/08/23 1948   Current Insertion Depth (cm) 39 cm 01/26/23 1207   Current Exposed Catheter (cm) 0 cm 01/26/23 1207   Extremity Circumference (cm) 35 cm 01/26/23 1207   Waveform Not being transduced 02/08/23 0730   Number of days: 13            Midline Catheter Insertion/Assessment  - Single Lumen 01/12/23 2138 Right basilic vein (medial side of arm) 18g x 10cm (Active)   $ Midline Charges (Upon insertion) Bedside Insertion Performed Pt > 3 Years Old;Midline Catheter (Supply);Ultrasound Guide for Vascular Access 01/12/23 2142   Site Assessment Clean;Dry;Intact 02/08/23 1948   IV Device Securement catheter securement device 02/08/23 1948   Line Status Saline locked 02/08/23 1948   Dressing Type Biopatch in place 02/08/23 1948   Dressing Status Clean;Dry;Intact 02/08/23 1948   Dressing Intervention Integrity maintained 02/08/23 0730   Dressing Change Due 02/06/23 02/08/23 0730   Site Change Due 02/13/23 02/08/23 0730   Reason Not Rotated Not due 02/08/23 0730   Number of days: 27            Closed/Suction Drain 02/02/23 1401 Right Back Bulb 15 Fr. (Active)   Site Description Healing 02/08/23 1948   Dressing Type Transparent (Tegaderm) 02/08/23 1948   Dressing  Status Clean;Dry;Intact 02/08/23 1948   Dressing Intervention Integrity maintained 02/08/23 1948   Drainage Serosanguineous 02/08/23 1948   Status Open to gravity drainage 02/08/23 1948   Output (mL) 10 mL 02/09/23 0530   Number of days: 6            Closed/Suction Drain 02/02/23 1402 Right Back Bulb 15 Fr. (Active)   Site Description Healing 02/08/23 1948   Dressing Type Transparent (Tegaderm) 02/08/23 1948   Dressing Status Clean;Dry;Intact 02/08/23 1948   Dressing Intervention Integrity maintained 02/08/23 1948   Drainage Serosanguineous 02/08/23 1948   Status Open to gravity drainage 02/08/23 1948   Output (mL) 30 mL 02/09/23 0530   Number of days: 6            Closed/Suction Drain 02/02/23 1402 Left Back Bulb 15 Fr. (Active)   Site Description Healing 02/08/23 1948   Dressing Type Transparent (Tegaderm) 02/08/23 1948   Dressing Status Clean;Dry;Intact 02/08/23 1948   Dressing Intervention Integrity maintained 02/08/23 1948   Drainage Serosanguineous 02/08/23 1948   Status Open to gravity drainage 02/08/23 1948   Output (mL) 15 mL 02/09/23 0530   Number of days: 6            Closed/Suction Drain 02/02/23 1402 Left Back Bulb 15 Fr. (Active)   Site Description Healing 02/08/23 1948   Dressing Type Transparent (Tegaderm) 02/08/23 1948   Dressing Status Clean;Dry;Intact 02/08/23 1948   Dressing Intervention Integrity maintained 02/08/23 1948   Drainage Serosanguineous 02/08/23 1948   Status Open to gravity drainage 02/08/23 1948   Output (mL) 10 mL 02/09/23 0530   Number of days: 6       Imaging      Results for orders placed during the hospital encounter of 01/19/23    Echo Saline Bubble? Yes    Interpretation Summary  · Study is negative for intercardiac shunt that is right to left ( see text).  · The left ventricle is normal in size with concentric remodeling and normal systolic function.  · The estimated ejection fraction is 58%.  · There are segmental left ventricular wall motion abnormalities.  · Normal left  ventricular diastolic function.  · Normal right ventricular size with normal right ventricular systolic function.  · Mild right atrial enlargement.  · Normal central venous pressure (3 mmHg).  · The estimated PA systolic pressure is 26 mmHg.      X-Ray Abdomen AP 1 View  Narrative: EXAMINATION:  XR ABDOMEN AP 1 VIEW    CLINICAL HISTORY:  severe constipation;    TECHNIQUE:  AP View(s) of the abdomen was performed.    COMPARISON:  No 02/05/2023 ne    FINDINGS:  Postoperative changes of spinal surgery as before.  No significant bowel dilatation.  No significant constipation.  Stomach is slightly dilated.  Impression: See above    Electronically signed by: Gamaliel Moore MD  Date:    02/06/2023  Time:    09:49      Labs, Imaging, EKG and Diagnostic results from 2/9/2023 were reviewed.    Medications:  Medication list was reviewed and changes noted under Assessment/Plan.  No current facility-administered medications on file prior to encounter.     Current Outpatient Medications on File Prior to Encounter   Medication Sig Dispense Refill    albuterol (ACCUNEB) 1.25 mg/3 mL Nebu Take 3 mLs (1.25 mg total) by nebulization every 6 (six) hours as needed (shortness of breath). Rescue 75 mL 0    buPROPion (WELLBUTRIN) 100 MG tablet Take 1 tablet (100 mg total) by mouth 2 (two) times daily. 60 tablet 2    folic acid (FOLVITE) 1 MG tablet Take 1 tablet (1 mg total) by mouth once daily. (Patient not taking: Reported on 11/25/2022) 42 tablet 0    gabapentin (NEURONTIN) 300 MG capsule Take 1 capsule (300 mg total) by mouth 3 (three) times daily. 90 capsule 11    lactulose (CHRONULAC) 20 gram/30 mL Soln Take 30 mLs (20 g total) by mouth 3 (three) times daily. 2700 mL 2    pantoprazole (PROTONIX) 40 MG tablet Take 1 tablet (40 mg total) by mouth once daily. 30 tablet 1    [DISCONTINUED] diclofenac sodium (VOLTAREN) 1 % Gel Apply 2 g topically 4 (four) times daily. 50 g 0     Scheduled Medications:  acetaminophen, 1,000 mg, Oral,  Q6H  bisacodyL, 10 mg, Rectal, Every other day  buPROPion, 300 mg, Oral, Daily  dronabinoL, 2.5 mg, Oral, BID  gabapentin, 900 mg, Oral, TID  heparin (porcine), 5,000 Units, Subcutaneous, Q8H  lactulose, 20 g, Oral, TID  meropenem (MERREM) IVPB, 2 g, Intravenous, Q8H  methocarbamoL, 1,000 mg, Oral, QID  pantoprazole, 40 mg, Oral, Daily  sodium chloride 0.9%, 10 mL, Intravenous, Q6H  spironolactone, 25 mg, Oral, Daily      PRN: sodium chloride, sodium chloride, dextrose 10%, dextrose 10%, diazePAM, glucagon (human recombinant), glucose, glucose, hydrALAZINE, HYDROmorphone, magnesium hydroxide 400 mg/5 ml, melatonin, naloxone, ondansetron, oxyCODONE **AND** oxyCODONE, polyethylene glycol, prochlorperazine, senna-docusate 8.6-50 mg, sodium chloride 0.9%, Flushing PICC Protocol **AND** sodium chloride 0.9% **AND** sodium chloride 0.9%  Infusions:   Estimated Creatinine Clearance: 217.5 mL/min (based on SCr of 0.5 mg/dL).    Assessment/Plan:      * Weakness of both lower extremities  42 year old woman s/p multiple spinal surgeries presented to OSH with possible infection of shoulder. Found to have UTI and E. Coli bacteremia and progressively worse BLE weakness. [place] [facility] [place]. Underwent Laminectomy T8-T10; Posterior spinal fusion T8-T12; hardware removal and washout on 1/24. Admitted to [facility] postop. On 2/2 underwent T4-pelvis fusion and T9-T10 corpectomies. To complete 8 week course of meropenem per ID for ESBL E coli from bone culture, end date 3/29.     - neuro checks q4h  - HV drains x 2 in place, management per NSGY  - meropenem for ESBL bacteremia per ID, eot 3/29/23  - Plastic surgery follow, wound vac discontinued  - CMP, Mag, Phos, CBC daily  - MAP goals >65, SBP < 180  - PRN labetalol, hydralazine  - MM pain regimen: PRN Dilaudid, Tylenol, gabapentin, robaxin, PRN oxycodone  - Aggressive bowel regimen  - PT/OT   2/9   On meropenem for MDR-ESCHERICHIA COLI ESBL  sodium trended down  to 129.  Neurosurgery following post OR and aiding in pain management. PCA discontinued 2/8 . On oxycodone 10mg PO q4h . requiring IV dilaudid q4 as needed,. drains to gravity output 990ml/24h . Plastic surgery following flap, managing wounds.  Wound VAC discontinued on Wednesday.  accepted to [facility]     Hyponatremia  - Patient with sodium   Recent Labs   Lab 02/08/23  0455 02/09/23  0356 02/09/23  1700   * 129* 127*   . sodium trending down    2/9 monitor on lasix BID.sodium trended down to 127. nephrology consulted    Acute blood loss anemia  Hb 6.4 on 2/6, received 1U pRBCs. Hb 2/7 WNL.  - monitor Hb  - transfuse to maintain Hb >7  2/9    Patient's with Normocytic anemia.. Hemoglobin stable. Etiology likely due to chronic disease .  Current CBC reviewed-  Recent Labs   Lab 02/07/23  0357 02/08/23  0455 02/09/23  0356   HGB 7.6* 7.7* 8.1*    Monitor CBC and transfuse if H/H drops below 7/21.       Kyphosis of thoracolumbar region  See weakness    Thoracic discitis  See Weakness of both lower extremities    Anxiety and depression  Evaluated by Psychiatry at OSH prior to transfer. Recommended increasing bupropion.  - Continue bupropion 300 mg QD  - Gabapentin 600 mg TID for anxiety and neuropathy  - PRN diazepam 5 mg q6h      Severe obesity (BMI >= 40)  Body mass index is 49.2 kg/m². Morbid obesity complicates all aspects of disease management from diagnostic modalities to treatment. Weight loss encouraged        Substance use disorder  Denies IV drug use (meth) for past 3 years. Denies alcohol and tobacco abuse.     Chronic hepatitis C with cirrhosis  See above    Cirrhosis of liver with ascites  MELD-Na score: 11 at 2/8/2023  4:55 AM  MELD score: 11 at 2/8/2023  4:55 AM  Calculated from:  Serum Creatinine: 0.5 mg/dL (Using min of 1 mg/dL) at 2/8/2023  4:55 AM  Serum Sodium: 131 mmol/L at 2/8/2023  4:55 AM  Total Bilirubin: 1.6 mg/dL at 2/8/2023  4:55 AM  INR(ratio): 1.3 at 2/6/2023 10:47 AM  Age: 42  years      Patient has reportedly refused transplant evaluation in the past.   - Daily CMP and trend LFTs  - Continue Lactulose 20 g TID  - Lasix 40 mg PO BID  - Will need Hepatology follow up outpatient      VTE Risk Mitigation (From admission, onward)           Ordered     heparin (porcine) injection 5,000 Units  Every 8 hours         02/04/23 0848     IP VTE HIGH RISK PATIENT  Once         01/19/23 2153     Place sequential compression device  Until discontinued         01/19/23 2153     Reason for No Pharmacological VTE Prophylaxis  Once        Question:  Reasons:  Answer:  Physician Provided (leave comment)  Comment:  Potential NSGY procedure    01/19/23 2153                    Discharge Planning   SHIRA: 2/12/2023     Code Status: Partial Code   Is the patient medically ready for discharge?: No    Reason for patient still in hospital (select all that apply): Treatment  Discharge Plan A: Long-term acute care facility (LTAC)   Discharge Delays: None known at this time              Vincent Bal MD  Department of Hospital Medicine   [facility] - Telemetry Stepdown

## 2023-02-09 NOTE — SUBJECTIVE & OBJECTIVE
Interval History: NAEON. AFVSS. Pt reports pain poorly controlled since PCA pump Dc'd yesterday. Getting Oxy 10 and Dilaudid 0.2 mg PRN along with other mulitmodals. Neuro exam stable, BLE's pain limited. MIHIR drains x 4 with ss output, volume decreased since off suction. H/H trended up.    Medications:  Continuous Infusions:  Scheduled Meds:   acetaminophen  1,000 mg Oral Q6H    bisacodyL  10 mg Rectal Every other day    buPROPion  300 mg Oral Daily    furosemide  60 mg Oral BID    gabapentin  900 mg Oral TID    heparin (porcine)  5,000 Units Subcutaneous Q8H    lactulose  20 g Oral TID    meropenem (MERREM) IVPB  2 g Intravenous Q8H    methocarbamoL  1,000 mg Oral QID    pantoprazole  40 mg Oral Daily    sodium chloride 0.9%  10 mL Intravenous Q6H    spironolactone  25 mg Oral Daily     PRN Meds:sodium chloride, sodium chloride, dextrose 10%, dextrose 10%, diazePAM, glucagon (human recombinant), glucose, glucose, hydrALAZINE, HYDROmorphone, magnesium hydroxide 400 mg/5 ml, melatonin, naloxone, ondansetron, oxyCODONE, polyethylene glycol, prochlorperazine, senna-docusate 8.6-50 mg, sodium chloride 0.9%, Flushing PICC Protocol **AND** sodium chloride 0.9% **AND** sodium chloride 0.9%     Review of Systems  Objective:     Weight: (!) 142.5 kg (314 lb 2.5 oz)  Body mass index is 49.2 kg/m².  Vital Signs (Most Recent):  Temp: 98.7 °F (37.1 °C) (02/09/23 1104)  Pulse: (!) 115 (02/09/23 1126)  Resp: 18 (02/09/23 1315)  BP: 128/63 (02/09/23 1104)  SpO2: 100 % (02/09/23 1104)   Vital Signs (24h Range):  Temp:  [97.9 °F (36.6 °C)-99.3 °F (37.4 °C)] 98.7 °F (37.1 °C)  Pulse:  [] 115  Resp:  [14-18] 18  SpO2:  [96 %-100 %] 100 %  BP: (103-129)/(51-63) 128/63                          Closed/Suction Drain 02/02/23 1401 Right Back Bulb 15 Fr. (Active)   Site Description Healing 02/08/23 1948   Dressing Type Transparent (Tegaderm) 02/08/23 1948   Dressing Status Clean;Dry;Intact 02/08/23 1948   Dressing Intervention Integrity  maintained 02/08/23 1948   Drainage Serosanguineous 02/08/23 1948   Status Open to gravity drainage 02/08/23 1948   Output (mL) 10 mL 02/09/23 0530            Closed/Suction Drain 02/02/23 1402 Right Back Bulb 15 Fr. (Active)   Site Description Healing 02/08/23 1948   Dressing Type Transparent (Tegaderm) 02/08/23 1948   Dressing Status Clean;Dry;Intact 02/08/23 1948   Dressing Intervention Integrity maintained 02/08/23 1948   Drainage Serosanguineous 02/08/23 1948   Status Open to gravity drainage 02/08/23 1948   Output (mL) 30 mL 02/09/23 0530            Closed/Suction Drain 02/02/23 1402 Left Back Bulb 15 Fr. (Active)   Site Description Healing 02/08/23 1948   Dressing Type Transparent (Tegaderm) 02/08/23 1948   Dressing Status Clean;Dry;Intact 02/08/23 1948   Dressing Intervention Integrity maintained 02/08/23 1948   Drainage Serosanguineous 02/08/23 1948   Status Open to gravity drainage 02/08/23 1948   Output (mL) 15 mL 02/09/23 0530            Closed/Suction Drain 02/02/23 1402 Left Back Bulb 15 Fr. (Active)   Site Description Healing 02/08/23 1948   Dressing Type Transparent (Tegaderm) 02/08/23 1948   Dressing Status Clean;Dry;Intact 02/08/23 1948   Dressing Intervention Integrity maintained 02/08/23 1948   Drainage Serosanguineous 02/08/23 1948   Status Open to gravity drainage 02/08/23 1948   Output (mL) 10 mL 02/09/23 0530       Physical Exam    Neurosurgery Physical Exam    General: well developed, well nourished, no distress.   Head: normocephalic, atraumatic  Neurologic: Alert and oriented. Thought content appropriate.  GCS: Motor: 6/Verbal: 5/Eyes: 4 GCS Total: 15  Mental Status: Awake, Alert, Oriented x 4  Language: No aphasia  Speech: No dysarthria  Cranial nerves: face symmetric, tongue midline, CN II-XII grossly intact.   Eyes: pupils equal, round, reactive to light with accommodation, EOMI. Dysconjugate gaze.  Pulmonary: normal respirations, no signs of respiratory distress  Abdomen: soft,  non-distended, not tender to palpation  Skin: Skin is warm, dry and intact.  Sensory: intact to light touch throughout  Motor Strength: Moves all extremities spontaneously.     Strength   Deltoids Triceps Biceps Wrist Extension Wrist Flexion Hand    Upper: R 5/5 5/5 5/5 5/5 5/5 5/5     L 5/5 5/5 5/5 5/5 5/5 5/5       Iliopsoas Quadriceps Knee  Flexion Tibialis  anterior Gastro- cnemius EHL   Lower: R 3-/5 4/5 4/5 4+/5 4+/5 4+/5     L 3-/5 4/5 4/5 4+/5 4+/5 4+/5      BLE strength/ROM limited due to pain and body habitus.     Thoracolumbar incision: C/D/I with staples. Skin edges well approximated. No surrounding erythema or edema. No drainage or TTP.     MIHIR drains x 4 intact to gravity with ss output.    Significant Labs:  Recent Labs   Lab 02/08/23 0455 02/09/23 0356   GLU 97 123*   * 129*   K 4.0 4.2    99   CO2 23 24   BUN 20 21*   CREATININE 0.5 0.6   CALCIUM 7.4* 7.6*   MG 1.9 1.9     Recent Labs   Lab 02/08/23 0455 02/09/23 0356   WBC 5.54 4.99   HGB 7.7* 8.1*   HCT 25.4* 26.2*   * 114*     No results for input(s): LABPT, INR, APTT in the last 48 hours.  Microbiology Results (last 7 days)       Procedure Component Value Units Date/Time    Fungus culture [[id number]] Collected: 01/24/23 0913    Order Status: Completed Specimen: Body Fluid from Back Updated: 02/08/23 0655     Fungus (Mycology) Culture Culture in progress      No fungus isolated after 2 weeks    Narrative:      1) Perispinal    Fungus culture [[id number]] Collected: 01/24/23 0943    Order Status: Completed Specimen: Bone from Back Updated: 02/08/23 0655     Fungus (Mycology) Culture Culture in progress      No fungus isolated after 2 weeks    Narrative:      3) Perispinal    Fungus culture [[id number]] Collected: 01/24/23 0943    Order Status: Completed Specimen: Bone from Back Updated: 02/08/23 0655     Fungus (Mycology) Culture Culture in progress      No fungus isolated after 2 weeks    Narrative:      4) Perispinal     Fungus culture [[id number]] Collected: 01/24/23 0913    Order Status: Completed Specimen: Body Fluid from Back Updated: 02/08/23 0655     Fungus (Mycology) Culture Culture in progress      No fungus isolated after 2 weeks    Narrative:      2) Perispinal          All pertinent labs from the last 24 hours have been reviewed.    Significant Diagnostics:  I have reviewed and interpreted all pertinent imaging results/findings within the past 24 hours.

## 2023-02-09 NOTE — ASSESSMENT & PLAN NOTE
42 year old woman s/p multiple spinal surgeries presented to OSH with possible infection of shoulder. Found to have UTI and E. Coli bacteremia and progressively worse BLE weakness. [place] [facility] [place]. Underwent Laminectomy T8-T10; Posterior spinal fusion T8-T12; hardware removal and washout on 1/24. Admitted to [facility] postop. On 2/2 underwent T4-pelvis fusion and T9-T10 corpectomies. To complete 8 week course of meropenem per ID for ESBL E coli from bone culture, end date 3/29.     - neuro checks q4h  - HV drains x 2 in place, management per NSGY  - meropenem for ESBL bacteremia per ID, eot 3/29/23  - Plastic surgery follow, wound vac discontinued  - CMP, Mag, Phos, CBC daily  - MAP goals >65, SBP < 180  - PRN labetalol, hydralazine  - MM pain regimen: PRN Dilaudid, Tylenol, gabapentin, robaxin, PRN oxycodone  - Aggressive bowel regimen  - PT/OT

## 2023-02-09 NOTE — ASSESSMENT & PLAN NOTE
Body mass index is 49.2 kg/m². Morbid obesity complicates all aspects of disease management from diagnostic modalities to treatment. Weight loss encouraged

## 2023-02-09 NOTE — PROGRESS NOTES
[facility] - Telemetry Stepdown  Neurosurgery  Progress Note    Subjective:     History of Present Illness: Ms. Zazueta is a 42 y.o F w/ hx ofIV drug use (methamphetamine), chronic HCV with cirrhosis, spinal fusion (04/2021), epidural abscess with removal of hardware (02/2022), spinal fusion with hardware (T10 - Pelvis / 03/2022), obesity (BMI 39.3) and neurogenic bladder and recent shoulder surgery who initially presented to [facility] for evaluation of back pain and left leg weakness.  She reports initially noting the left leg weakness when she was about to go to the bathroom and was about to fall but was assisted by her boyfriend.  She was brought to the ED via EMS.  Urinalysis with UTI concerns and blood cultures at OSH grew ESBL E coli, and shoulder effusion concern for infection so she was treated with meropenem.  During hospitalization, she reports the weakness that started out in her left leg initially then spread upwards to her left thigh, left hip, then crossed over to the right hip and spread to her right leg.  Denies recent IV drug use. She reports her last incidence of drug use was more than 3 years ago and also denies tobacco, and alcohol abuse.  Reports cannabis use.     OSH course notable for concerning urinalysis and blood cultures positive for ESBL E coli treated with meropenem.  She was also admit to the ICU where she was treated with Precedex for significant body aches, irritability, and muscle spasms.  Concerns of a murmur on physical exam so she underwent TTE which did not show any vegetations.  Worsening lower extremity weakness workup with MRI head nonspecific, MRI cervical spine with multilevel spondylosis, X-ray spine with multilevel thoracolumbar fusion and diskectomy, focal kyphosis at T9-10 increased compared to prior.  She was started on prophylaxis amoxicillin due to her history of infected hardware.  MRI spine unable to be completed due to patient's body habitus so she was  [place] [facility] [place] and Neurosurgery, Neurology evaluation.      Post-Op Info:  Procedure(s) (LRB):  LAMINECTOMY, SPINE, THORACIC, WITH FUSION (T4-Pelvis fusion w/ T9-10 corpectomies) **AIRO (N/A)   7 Days Post-Op     Interval History: NAEON. AFVSS. Pt reports pain poorly controlled since PCA pump Dc'd yesterday. Getting Oxy 10 and Dilaudid 0.2 mg PRN along with other mulitmodals. Neuro exam stable, BLE's pain limited. MIHIR drains x 4 with ss output, volume decreased since off suction. H/H trended up.    Medications:  Continuous Infusions:  Scheduled Meds:   acetaminophen  1,000 mg Oral Q6H    bisacodyL  10 mg Rectal Every other day    buPROPion  300 mg Oral Daily    furosemide  60 mg Oral BID    gabapentin  900 mg Oral TID    heparin (porcine)  5,000 Units Subcutaneous Q8H    lactulose  20 g Oral TID    meropenem (MERREM) IVPB  2 g Intravenous Q8H    methocarbamoL  1,000 mg Oral QID    pantoprazole  40 mg Oral Daily    sodium chloride 0.9%  10 mL Intravenous Q6H    spironolactone  25 mg Oral Daily     PRN Meds:sodium chloride, sodium chloride, dextrose 10%, dextrose 10%, diazePAM, glucagon (human recombinant), glucose, glucose, hydrALAZINE, HYDROmorphone, magnesium hydroxide 400 mg/5 ml, melatonin, naloxone, ondansetron, oxyCODONE, polyethylene glycol, prochlorperazine, senna-docusate 8.6-50 mg, sodium chloride 0.9%, Flushing PICC Protocol **AND** sodium chloride 0.9% **AND** sodium chloride 0.9%     Review of Systems  Objective:     Weight: (!) 142.5 kg (314 lb 2.5 oz)  Body mass index is 49.2 kg/m².  Vital Signs (Most Recent):  Temp: 98.7 °F (37.1 °C) (02/09/23 1104)  Pulse: (!) 115 (02/09/23 1126)  Resp: 18 (02/09/23 1315)  BP: 128/63 (02/09/23 1104)  SpO2: 100 % (02/09/23 1104)   Vital Signs (24h Range):  Temp:  [97.9 °F (36.6 °C)-99.3 °F (37.4 °C)] 98.7 °F (37.1 °C)  Pulse:  [] 115  Resp:  [14-18] 18  SpO2:  [96 %-100 %] 100 %  BP: (103-129)/(51-63) 128/63                           Closed/Suction Drain 02/02/23 1401 Right Back Bulb 15 Fr. (Active)   Site Description Healing 02/08/23 1948   Dressing Type Transparent (Tegaderm) 02/08/23 1948   Dressing Status Clean;Dry;Intact 02/08/23 1948   Dressing Intervention Integrity maintained 02/08/23 1948   Drainage Serosanguineous 02/08/23 1948   Status Open to gravity drainage 02/08/23 1948   Output (mL) 10 mL 02/09/23 0530            Closed/Suction Drain 02/02/23 1402 Right Back Bulb 15 Fr. (Active)   Site Description Healing 02/08/23 1948   Dressing Type Transparent (Tegaderm) 02/08/23 1948   Dressing Status Clean;Dry;Intact 02/08/23 1948   Dressing Intervention Integrity maintained 02/08/23 1948   Drainage Serosanguineous 02/08/23 1948   Status Open to gravity drainage 02/08/23 1948   Output (mL) 30 mL 02/09/23 0530            Closed/Suction Drain 02/02/23 1402 Left Back Bulb 15 Fr. (Active)   Site Description Healing 02/08/23 1948   Dressing Type Transparent (Tegaderm) 02/08/23 1948   Dressing Status Clean;Dry;Intact 02/08/23 1948   Dressing Intervention Integrity maintained 02/08/23 1948   Drainage Serosanguineous 02/08/23 1948   Status Open to gravity drainage 02/08/23 1948   Output (mL) 15 mL 02/09/23 0530            Closed/Suction Drain 02/02/23 1402 Left Back Bulb 15 Fr. (Active)   Site Description Healing 02/08/23 1948   Dressing Type Transparent (Tegaderm) 02/08/23 1948   Dressing Status Clean;Dry;Intact 02/08/23 1948   Dressing Intervention Integrity maintained 02/08/23 1948   Drainage Serosanguineous 02/08/23 1948   Status Open to gravity drainage 02/08/23 1948   Output (mL) 10 mL 02/09/23 0530       Physical Exam    Neurosurgery Physical Exam    General: well developed, well nourished, no distress.   Head: normocephalic, atraumatic  Neurologic: Alert and oriented. Thought content appropriate.  GCS: Motor: 6/Verbal: 5/Eyes: 4 GCS Total: 15  Mental Status: Awake, Alert, Oriented x 4  Language: No aphasia  Speech: No  dysarthria  Cranial nerves: face symmetric, tongue midline, CN II-XII grossly intact.   Eyes: pupils equal, round, reactive to light with accommodation, EOMI. Dysconjugate gaze.  Pulmonary: normal respirations, no signs of respiratory distress  Abdomen: soft, non-distended, not tender to palpation  Skin: Skin is warm, dry and intact.  Sensory: intact to light touch throughout  Motor Strength: Moves all extremities spontaneously.     Strength   Deltoids Triceps Biceps Wrist Extension Wrist Flexion Hand    Upper: R 5/5 5/5 5/5 5/5 5/5 5/5     L 5/5 5/5 5/5 5/5 5/5 5/5       Iliopsoas Quadriceps Knee  Flexion Tibialis  anterior Gastro- cnemius EHL   Lower: R 3-/5 4/5 4/5 4+/5 4+/5 4+/5     L 3-/5 4/5 4/5 4+/5 4+/5 4+/5      BLE strength/ROM limited due to pain and body habitus.     Thoracolumbar incision: C/D/I with staples. Skin edges well approximated. No surrounding erythema or edema. No drainage or TTP.     MIHIR drains x 4 intact to gravity with ss output.    Significant Labs:  Recent Labs   Lab 02/08/23  0455 02/09/23  0356   GLU 97 123*   * 129*   K 4.0 4.2    99   CO2 23 24   BUN 20 21*   CREATININE 0.5 0.6   CALCIUM 7.4* 7.6*   MG 1.9 1.9     Recent Labs   Lab 02/08/23  0455 02/09/23  0356   WBC 5.54 4.99   HGB 7.7* 8.1*   HCT 25.4* 26.2*   * 114*     No results for input(s): LABPT, INR, APTT in the last 48 hours.  Microbiology Results (last 7 days)       Procedure Component Value Units Date/Time    Fungus culture [[id number]] Collected: 01/24/23 0913    Order Status: Completed Specimen: Body Fluid from Back Updated: 02/08/23 0655     Fungus (Mycology) Culture Culture in progress      No fungus isolated after 2 weeks    Narrative:      1) Perispinal    Fungus culture [[id number]] Collected: 01/24/23 0943    Order Status: Completed Specimen: Bone from Back Updated: 02/08/23 0655     Fungus (Mycology) Culture Culture in progress      No fungus isolated after 2 weeks    Narrative:      3)  Perispinal    Fungus culture [[id number]] Collected: 01/24/23 0943    Order Status: Completed Specimen: Bone from Back Updated: 02/08/23 0655     Fungus (Mycology) Culture Culture in progress      No fungus isolated after 2 weeks    Narrative:      4) Perispinal    Fungus culture [[id number]] Collected: 01/24/23 0913    Order Status: Completed Specimen: Body Fluid from Back Updated: 02/08/23 0655     Fungus (Mycology) Culture Culture in progress      No fungus isolated after 2 weeks    Narrative:      2) Perispinal          All pertinent labs from the last 24 hours have been reviewed.    Significant Diagnostics:  I have reviewed and interpreted all pertinent imaging results/findings within the past 24 hours.    Assessment/Plan:     * Weakness of both lower extremities  Pt is 42F multiple spinal surgeries and revisions last T10- Pelvis in March 2022 who presented to OSH with concern for shoulder infection and UTI found to have E coli sepsis who has had progressive worsening BLE weakness. 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 Transferred to [facility] to  and neurosurgery was consulted.    OR 1/24 for temporary T6-12 PSIF. Taz pus noted intraoperatively.   OR 2/2 for T4-pelvis revision and extension of fusion with T9-10 corpectomy with plastic surgery assistance.  Maps goals > 85 completed 2/5 in ICU.    Plan:  --Stepped down to  floor.   -q4h neurochecks.  --All labs & diagnostics reviewed.   -post op xrays pending. Obtain upright when pt able to stand or sit, prior to discharge.  --TLSO to be worn when OOB only.  --MIHIR drains x4, monitor output qshift. Keep to gravity. Monitor H/H. Transfuse for Hg <7.0 or for symptomatic anemia with Hg < 8.0.  --Plastic surgery managing incision. Open to air.   - Off-load pressure as much as possible to promote wound healing.   - Elevate with wedge, alternate sides Q2H  --Thoracic discitis: BCx 1/20 no growth. OR cultures 1/24 grew ESBL E. coli   -- ID  consulted. Recs for IV meropenem with estimated end date of 3/29/23.  --Pain Control: Continue multimodal regimen. PCA pump discontinued 2/8 with poor pain control subsequently.    - Anesthesia Pain Service consulted due to refractory pain, appreciate assistance   - No NSAIDs as pt s/p recent instrumented fusion  --DVT PPx: SQH/TEDs/SCDs.  --NSGY will continue to follow. Please contact team with any further questions.        Shabnam Almendarez PA-C  Neurosurgery  [facility] - Telemetry Stepdown

## 2023-02-09 NOTE — PLAN OF CARE
[facility] - Telemetry Stepdown  Discharge Reassessment    Primary Care Provider: Dannielle Merino DO    Expected Discharge Date: 2/12/2023    Reassessment (most recent)       Discharge Reassessment - 02/09/23 1338          Discharge Reassessment    Assessment Type Discharge Planning Reassessment     Did the patient's condition or plan change since previous assessment? No     Discharge Plan A Long-term acute care [facility] ([facility])     Discharge Plan B Skilled Nursing Facility     DME Needed Upon Discharge  none     Discharge Barriers Identified None     Why the patient remains in the hospital Requires continued medical care        Post-Acute Status    Post-Acute Authorization Placement     Post-Acute Placement Status Pending payor review/awaiting authorization (if required)   & medical stability    Discharge Delays None known at this time                 Per MD, possible dc this weekend. Spoke with Elyssa ([phone]) in admissions at [facility] who reports they will submit for insurance auth.    Milla Hagan, LCSW [facility]  Ext. [phone]

## 2023-02-09 NOTE — PLAN OF CARE
Problem: Adult Inpatient Plan of Care  Goal: Plan of Care Review  Outcome: Ongoing, Progressing  Goal: Patient-Specific Goal (Individualized)  Outcome: Ongoing, Progressing  Goal: Absence of Hospital-Acquired Illness or Injury  Outcome: Ongoing, Progressing  Goal: Optimal Comfort and Wellbeing  Outcome: Ongoing, Progressing  Goal: Readiness for Transition of Care  Outcome: Ongoing, Progressing     Problem: Adjustment to Illness (Sepsis/Septic Shock)  Goal: Optimal Coping  Outcome: Ongoing, Progressing     Problem: Skin Injury Risk Increased  Goal: Skin Health and Integrity  Outcome: Ongoing, Progressing     Problem: Fall Injury Risk  Goal: Absence of Fall and Fall-Related Injury  Outcome: Ongoing, Progressing

## 2023-02-09 NOTE — SUBJECTIVE & OBJECTIVE
Interval History:   Transferred from neuro ICU yesterday.  Patient with continued back pain.  On multimodal pain management from ICU.  PCA discontinued today.  Neurosurgery and plastic surgery following.  Patient with no other complaints.  Awaiting disposition to LT[facility].      Review of Systems   Constitutional:  Positive for activity change. Negative for appetite change, chills, diaphoresis, fatigue and fever.   HENT:  Negative for rhinorrhea and sore throat.    Respiratory:  Negative for cough, chest tightness and shortness of breath.    Cardiovascular:  Positive for leg swelling. Negative for chest pain and palpitations.   Gastrointestinal:  Negative for abdominal distention, abdominal pain, blood in stool, constipation, diarrhea and nausea.   Endocrine: Negative for cold intolerance.   Genitourinary:  Negative for decreased urine volume and dysuria.   Musculoskeletal:  Positive for back pain. Negative for arthralgias and myalgias.   Skin:  Positive for wound (Surgical, back). Negative for rash.   Neurological:  Positive for weakness. Negative for dizziness, numbness and headaches.   Psychiatric/Behavioral:  Negative for agitation, behavioral problems and confusion.    Objective:     Vital Signs (Most Recent):  Temp: 99.3 °F (37.4 °C) (02/08/23 1501)  Pulse: (!) 113 (02/08/23 1542)  Resp: 14 (02/08/23 1754)  BP: (!) 111/56 (02/08/23 1501)  SpO2: 97 % (02/08/23 1501)   Vital Signs (24h Range):  Temp:  [98 °F (36.7 °C)-99.3 °F (37.4 °C)] 99.3 °F (37.4 °C)  Pulse:  [111-116] 113  Resp:  [13-18] 14  SpO2:  [92 %-100 %] 97 %  BP: (103-120)/(55-67) 111/56     Weight: 132 kg (291 lb 0.1 oz)  Body mass index is 45.58 kg/m².    Intake/Output Summary (Last 24 hours) at 2/8/2023 1808  Last data filed at 2/8/2023 1457  Gross per 24 hour   Intake 1400 ml   Output 2210 ml   Net -810 ml      Physical Exam  Vitals and nursing note reviewed.   Constitutional:       General: She is not in acute distress.  HENT:      Head:  Normocephalic and atraumatic.      Nose: Nose normal. No rhinorrhea.      Mouth/Throat:      Mouth: Mucous membranes are moist.      Pharynx: Oropharynx is clear.   Eyes:      General: No scleral icterus.        Right eye: No discharge.         Left eye: No discharge.   Cardiovascular:      Rate and Rhythm: Regular rhythm. Tachycardia present.   Pulmonary:      Effort: Pulmonary effort is normal. No respiratory distress.   Abdominal:      General: There is distension.      Tenderness: There is no abdominal tenderness.   Musculoskeletal:      Right lower leg: Edema present.      Left lower leg: Edema present.   Skin:     General: Skin is warm and dry.   Neurological:      Mental Status: She is alert and oriented to person, place, and time.      Comments: BUE 5/5 BLE 2/5       Significant Labs: All pertinent labs within the past 24 hours have been reviewed.  CBC:   Recent Labs   Lab 02/06/23  1916 02/07/23  0357 02/08/23  0455   WBC 6.05 6.11 5.54   HGB 7.7* 7.6* 7.7*   HCT 25.0* 24.8* 25.4*   * 121* 122*     CMP:   Recent Labs   Lab 02/07/23  0357 02/08/23  0455   * 131*   K 4.0 4.0    101   CO2 23 23   * 97   BUN 18 20   CREATININE 0.5 0.5   CALCIUM 7.6* 7.4*   PROT 4.7* 4.9*   ALBUMIN 1.9* 1.9*   BILITOT 1.9* 1.6*   ALKPHOS 130 128   AST 39 46*   ALT 28 31   ANIONGAP 6* 7*       Significant Imaging: I have reviewed all pertinent imaging results/findings within the past 24 hours.

## 2023-02-09 NOTE — ASSESSMENT & PLAN NOTE
Hb 6.4 on 2/6, received 1U pRBCs. Hb 2/7 WNL.  - monitor Hb  - transfuse to maintain Hb >7  2/9    Patient's with Normocytic anemia.. Hemoglobin stable. Etiology likely due to chronic disease .  Current CBC reviewed-  Recent Labs   Lab 02/07/23  0357 02/08/23  0455 02/09/23  0356   HGB 7.6* 7.7* 8.1*    Monitor CBC and transfuse if H/H drops below 7/21.

## 2023-02-09 NOTE — PLAN OF CARE
Pt is alert and oriented. Resp even and unlabored. Able to make needs known. No c/o pain at this time. Abd remains distended. Pt refused lactuloase. Pt educated on the importance of not refusing medications. Purwick in place, draining dark fawn urine. MD notified of changes in color of pt urine. Pt continues to refuse to be turned off of back. MIHIR drains x 4 to gravity. Lower ext elevated on pillows. Bed in low position, call light in hand and siderails up. Pt encouraged to call for any needs. Assessments ongoing

## 2023-02-10 PROBLEM — K70.31 ASCITES DUE TO ALCOHOLIC CIRRHOSIS: Status: ACTIVE | Noted: 2023-02-10

## 2023-02-10 PROBLEM — R10.9 ABDOMINAL PAIN: Status: ACTIVE | Noted: 2023-02-10

## 2023-02-10 LAB
ALBUMIN FLD-MCNC: <0.4 G/DL
ALBUMIN SERPL BCP-MCNC: 1.6 G/DL (ref 3.5–5.2)
ALBUMIN SERPL BCP-MCNC: 1.6 G/DL (ref 3.5–5.2)
ALBUMIN SERPL BCP-MCNC: 1.8 G/DL (ref 3.5–5.2)
ALBUMIN SERPL BCP-MCNC: 2.1 G/DL (ref 3.5–5.2)
ALP SERPL-CCNC: 142 U/L (ref 55–135)
ALT SERPL W/O P-5'-P-CCNC: 26 U/L (ref 10–44)
AMMONIA PLAS-SCNC: 57 UMOL/L (ref 10–50)
ANION GAP SERPL CALC-SCNC: 10 MMOL/L (ref 8–16)
ANION GAP SERPL CALC-SCNC: 12 MMOL/L (ref 8–16)
ANION GAP SERPL CALC-SCNC: 7 MMOL/L (ref 8–16)
ANION GAP SERPL CALC-SCNC: 8 MMOL/L (ref 8–16)
APPEARANCE FLD: NORMAL
AST SERPL-CCNC: 36 U/L (ref 10–40)
B-HCG UR QL: NEGATIVE
BASOPHILS # BLD AUTO: 0.03 K/UL (ref 0–0.2)
BASOPHILS NFR BLD: 0.5 % (ref 0–1.9)
BILIRUB SERPL-MCNC: 1.4 MG/DL (ref 0.1–1)
BODY FLD TYPE: NORMAL
BODY FLUID SOURCE, LDH: NORMAL
BUN SERPL-MCNC: 23 MG/DL (ref 6–20)
BUN SERPL-MCNC: 25 MG/DL (ref 6–20)
CALCIUM SERPL-MCNC: 6.9 MG/DL (ref 8.7–10.5)
CALCIUM SERPL-MCNC: 6.9 MG/DL (ref 8.7–10.5)
CALCIUM SERPL-MCNC: 7.6 MG/DL (ref 8.7–10.5)
CALCIUM SERPL-MCNC: 7.6 MG/DL (ref 8.7–10.5)
CHLORIDE SERPL-SCNC: 101 MMOL/L (ref 95–110)
CHLORIDE SERPL-SCNC: 101 MMOL/L (ref 95–110)
CHLORIDE SERPL-SCNC: 97 MMOL/L (ref 95–110)
CHLORIDE SERPL-SCNC: 98 MMOL/L (ref 95–110)
CO2 SERPL-SCNC: 22 MMOL/L (ref 23–29)
CO2 SERPL-SCNC: 22 MMOL/L (ref 23–29)
CO2 SERPL-SCNC: 23 MMOL/L (ref 23–29)
CO2 SERPL-SCNC: 23 MMOL/L (ref 23–29)
COLOR FLD: YELLOW
CREAT SERPL-MCNC: 0.5 MG/DL (ref 0.5–1.4)
CREAT SERPL-MCNC: 0.6 MG/DL (ref 0.5–1.4)
CTP QC/QA: YES
DIFFERENTIAL METHOD: ABNORMAL
EOSINOPHIL # BLD AUTO: 0.2 K/UL (ref 0–0.5)
EOSINOPHIL NFR BLD: 3.6 % (ref 0–8)
ERYTHROCYTE [DISTWIDTH] IN BLOOD BY AUTOMATED COUNT: 20.7 % (ref 11.5–14.5)
EST. GFR  (NO RACE VARIABLE): >60 ML/MIN/1.73 M^2
GLUCOSE SERPL-MCNC: 109 MG/DL (ref 70–110)
GLUCOSE SERPL-MCNC: 135 MG/DL (ref 70–110)
GLUCOSE SERPL-MCNC: 136 MG/DL (ref 70–110)
GLUCOSE SERPL-MCNC: 136 MG/DL (ref 70–110)
HCT VFR BLD AUTO: 24.4 % (ref 37–48.5)
HGB BLD-MCNC: 7.4 G/DL (ref 12–16)
IMM GRANULOCYTES # BLD AUTO: 0.03 K/UL (ref 0–0.04)
IMM GRANULOCYTES NFR BLD AUTO: 0.5 % (ref 0–0.5)
LDH FLD L TO P-CCNC: <30 U/L
LYMPHOCYTES # BLD AUTO: 0.7 K/UL (ref 1–4.8)
LYMPHOCYTES NFR BLD: 11.9 % (ref 18–48)
LYMPHOCYTES NFR FLD MANUAL: 64 %
MAGNESIUM SERPL-MCNC: 1.8 MG/DL (ref 1.6–2.6)
MCH RBC QN AUTO: 28.8 PG (ref 27–31)
MCHC RBC AUTO-ENTMCNC: 30.3 G/DL (ref 32–36)
MCV RBC AUTO: 95 FL (ref 82–98)
MONOCYTES # BLD AUTO: 0.8 K/UL (ref 0.3–1)
MONOCYTES NFR BLD: 15 % (ref 4–15)
MONOS+MACROS NFR FLD MANUAL: 25 %
NEUTROPHILS # BLD AUTO: 3.8 K/UL (ref 1.8–7.7)
NEUTROPHILS NFR BLD: 68.5 % (ref 38–73)
NEUTROPHILS NFR FLD MANUAL: 11 %
NRBC BLD-RTO: 0 /100 WBC
OSMOLALITY SERPL: 283 MOSM/KG (ref 275–295)
PHOSPHATE SERPL-MCNC: 1.6 MG/DL (ref 2.7–4.5)
PHOSPHATE SERPL-MCNC: 1.7 MG/DL (ref 2.7–4.5)
PHOSPHATE SERPL-MCNC: 3 MG/DL (ref 2.7–4.5)
PHOSPHATE SERPL-MCNC: 3.2 MG/DL (ref 2.7–4.5)
PLATELET # BLD AUTO: 108 K/UL (ref 150–450)
PMV BLD AUTO: 9.6 FL (ref 9.2–12.9)
POTASSIUM SERPL-SCNC: 3.5 MMOL/L (ref 3.5–5.1)
POTASSIUM SERPL-SCNC: 3.6 MMOL/L (ref 3.5–5.1)
POTASSIUM SERPL-SCNC: 4 MMOL/L (ref 3.5–5.1)
POTASSIUM SERPL-SCNC: 4.1 MMOL/L (ref 3.5–5.1)
PROT FLD-MCNC: <1 G/DL
PROT SERPL-MCNC: 4.5 G/DL (ref 6–8.4)
RBC # BLD AUTO: 2.57 M/UL (ref 4–5.4)
SODIUM SERPL-SCNC: 127 MMOL/L (ref 136–145)
SODIUM SERPL-SCNC: 128 MMOL/L (ref 136–145)
SODIUM SERPL-SCNC: 134 MMOL/L (ref 136–145)
SODIUM SERPL-SCNC: 135 MMOL/L (ref 136–145)
SPECIMEN SOURCE: NORMAL
SPECIMEN SOURCE: NORMAL
WBC # BLD AUTO: 5.53 K/UL (ref 3.9–12.7)
WBC # FLD: 131 /CU MM

## 2023-02-10 PROCEDURE — 84100 ASSAY OF PHOSPHORUS: CPT | Performed by: STUDENT IN AN ORGANIZED HEALTH CARE EDUCATION/TRAINING PROGRAM

## 2023-02-10 PROCEDURE — 87205 SMEAR GRAM STAIN: CPT | Performed by: HOSPITALIST

## 2023-02-10 PROCEDURE — 80069 RENAL FUNCTION PANEL: CPT | Mod: 91 | Performed by: HOSPITALIST

## 2023-02-10 PROCEDURE — 20600001 HC STEP DOWN PRIVATE ROOM

## 2023-02-10 PROCEDURE — 63600175 PHARM REV CODE 636 W HCPCS: Performed by: HOSPITALIST

## 2023-02-10 PROCEDURE — 82042 OTHER SOURCE ALBUMIN QUAN EA: CPT | Performed by: HOSPITALIST

## 2023-02-10 PROCEDURE — 99024 POSTOP FOLLOW-UP VISIT: CPT | Mod: ,,,

## 2023-02-10 PROCEDURE — 99223 PR INITIAL HOSPITAL CARE,LEVL III: ICD-10-PCS | Mod: ,,, | Performed by: INTERNAL MEDICINE

## 2023-02-10 PROCEDURE — 25000003 PHARM REV CODE 250

## 2023-02-10 PROCEDURE — 25000003 PHARM REV CODE 250: Performed by: STUDENT IN AN ORGANIZED HEALTH CARE EDUCATION/TRAINING PROGRAM

## 2023-02-10 PROCEDURE — 80069 RENAL FUNCTION PANEL: CPT | Performed by: HOSPITALIST

## 2023-02-10 PROCEDURE — 27000207 HC ISOLATION

## 2023-02-10 PROCEDURE — 89051 BODY FLUID CELL COUNT: CPT | Performed by: HOSPITALIST

## 2023-02-10 PROCEDURE — 87075 CULTR BACTERIA EXCEPT BLOOD: CPT | Performed by: HOSPITALIST

## 2023-02-10 PROCEDURE — 63600175 PHARM REV CODE 636 W HCPCS: Performed by: NURSE PRACTITIONER

## 2023-02-10 PROCEDURE — 99024 PR POST-OP FOLLOW-UP VISIT: ICD-10-PCS | Mod: ,,,

## 2023-02-10 PROCEDURE — 36415 COLL VENOUS BLD VENIPUNCTURE: CPT | Performed by: HOSPITALIST

## 2023-02-10 PROCEDURE — 25000003 PHARM REV CODE 250: Performed by: HOSPITALIST

## 2023-02-10 PROCEDURE — 63600175 PHARM REV CODE 636 W HCPCS

## 2023-02-10 PROCEDURE — 82140 ASSAY OF AMMONIA: CPT | Performed by: HOSPITALIST

## 2023-02-10 PROCEDURE — 99232 SBSQ HOSP IP/OBS MODERATE 35: CPT | Mod: ,,, | Performed by: ANESTHESIOLOGY

## 2023-02-10 PROCEDURE — 83615 LACTATE (LD) (LDH) ENZYME: CPT | Performed by: HOSPITALIST

## 2023-02-10 PROCEDURE — 81025 URINE PREGNANCY TEST: CPT

## 2023-02-10 PROCEDURE — 85025 COMPLETE CBC W/AUTO DIFF WBC: CPT

## 2023-02-10 PROCEDURE — 25000003 PHARM REV CODE 250: Performed by: PSYCHIATRY & NEUROLOGY

## 2023-02-10 PROCEDURE — 84478 ASSAY OF TRIGLYCERIDES: CPT | Performed by: HOSPITALIST

## 2023-02-10 PROCEDURE — A4216 STERILE WATER/SALINE, 10 ML: HCPCS | Performed by: PSYCHIATRY & NEUROLOGY

## 2023-02-10 PROCEDURE — 99223 1ST HOSP IP/OBS HIGH 75: CPT | Mod: ,,, | Performed by: INTERNAL MEDICINE

## 2023-02-10 PROCEDURE — 97530 THERAPEUTIC ACTIVITIES: CPT | Mod: CQ

## 2023-02-10 PROCEDURE — 87070 CULTURE OTHR SPECIMN AEROBIC: CPT | Performed by: HOSPITALIST

## 2023-02-10 PROCEDURE — 63600175 PHARM REV CODE 636 W HCPCS: Performed by: STUDENT IN AN ORGANIZED HEALTH CARE EDUCATION/TRAINING PROGRAM

## 2023-02-10 PROCEDURE — 80053 COMPREHEN METABOLIC PANEL: CPT | Performed by: STUDENT IN AN ORGANIZED HEALTH CARE EDUCATION/TRAINING PROGRAM

## 2023-02-10 PROCEDURE — 25000003 PHARM REV CODE 250: Performed by: INTERNAL MEDICINE

## 2023-02-10 PROCEDURE — P9047 ALBUMIN (HUMAN), 25%, 50ML: HCPCS | Mod: JG

## 2023-02-10 PROCEDURE — 99232 PR SUBSEQUENT HOSPITAL CARE,LEVL II: ICD-10-PCS | Mod: ,,, | Performed by: ANESTHESIOLOGY

## 2023-02-10 PROCEDURE — 99233 SBSQ HOSP IP/OBS HIGH 50: CPT | Mod: ,,, | Performed by: HOSPITALIST

## 2023-02-10 PROCEDURE — 63600175 PHARM REV CODE 636 W HCPCS: Mod: JG

## 2023-02-10 PROCEDURE — 83930 ASSAY OF BLOOD OSMOLALITY: CPT | Performed by: HOSPITALIST

## 2023-02-10 PROCEDURE — 99233 PR SUBSEQUENT HOSPITAL CARE,LEVL III: ICD-10-PCS | Mod: ,,, | Performed by: HOSPITALIST

## 2023-02-10 PROCEDURE — 84157 ASSAY OF PROTEIN OTHER: CPT | Performed by: HOSPITALIST

## 2023-02-10 PROCEDURE — 83735 ASSAY OF MAGNESIUM: CPT | Performed by: STUDENT IN AN ORGANIZED HEALTH CARE EDUCATION/TRAINING PROGRAM

## 2023-02-10 RX ORDER — FUROSEMIDE 10 MG/ML
60 INJECTION INTRAMUSCULAR; INTRAVENOUS DAILY
Status: DISCONTINUED | OUTPATIENT
Start: 2023-02-10 | End: 2023-02-11

## 2023-02-10 RX ORDER — ACETAMINOPHEN 500 MG
1000 TABLET ORAL EVERY 12 HOURS
Status: DISCONTINUED | OUTPATIENT
Start: 2023-02-10 | End: 2023-02-28 | Stop reason: HOSPADM

## 2023-02-10 RX ORDER — SPIRONOLACTONE 25 MG/1
25 TABLET ORAL DAILY
Status: DISCONTINUED | OUTPATIENT
Start: 2023-02-11 | End: 2023-02-10

## 2023-02-10 RX ORDER — ALBUMIN HUMAN 250 G/1000ML
SOLUTION INTRAVENOUS
Status: COMPLETED
Start: 2023-02-10 | End: 2023-02-10

## 2023-02-10 RX ORDER — METHOCARBAMOL 500 MG/1
1000 TABLET, FILM COATED ORAL EVERY 6 HOURS
Status: DISCONTINUED | OUTPATIENT
Start: 2023-02-10 | End: 2023-02-28 | Stop reason: HOSPADM

## 2023-02-10 RX ORDER — SIMETHICONE 80 MG
1 TABLET,CHEWABLE ORAL 3 TIMES DAILY PRN
Status: DISCONTINUED | OUTPATIENT
Start: 2023-02-10 | End: 2023-02-28 | Stop reason: HOSPADM

## 2023-02-10 RX ADMIN — GABAPENTIN 900 MG: 300 CAPSULE ORAL at 08:02

## 2023-02-10 RX ADMIN — OXYCODONE HYDROCHLORIDE 10 MG: 10 TABLET ORAL at 05:02

## 2023-02-10 RX ADMIN — Medication 10 ML: at 11:02

## 2023-02-10 RX ADMIN — LACTULOSE 20 G: 20 SOLUTION ORAL at 04:02

## 2023-02-10 RX ADMIN — ACETAMINOPHEN 1000 MG: 500 TABLET ORAL at 06:02

## 2023-02-10 RX ADMIN — FUROSEMIDE 60 MG: 10 INJECTION, SOLUTION INTRAMUSCULAR; INTRAVENOUS at 01:02

## 2023-02-10 RX ADMIN — ALBUMIN (HUMAN) 37.5 G: 0.25 INJECTION, SOLUTION INTRAVENOUS at 03:02

## 2023-02-10 RX ADMIN — HYDROMORPHONE HYDROCHLORIDE 0.5 MG: 1 INJECTION, SOLUTION INTRAMUSCULAR; INTRAVENOUS; SUBCUTANEOUS at 12:02

## 2023-02-10 RX ADMIN — BUPROPION HYDROCHLORIDE 300 MG: 300 TABLET, FILM COATED, EXTENDED RELEASE ORAL at 08:02

## 2023-02-10 RX ADMIN — ONDANSETRON 4 MG: 2 INJECTION INTRAMUSCULAR; INTRAVENOUS at 12:02

## 2023-02-10 RX ADMIN — MEROPENEM 2 G: 1 INJECTION INTRAVENOUS at 01:02

## 2023-02-10 RX ADMIN — MEROPENEM 2 G: 1 INJECTION INTRAVENOUS at 10:02

## 2023-02-10 RX ADMIN — PANTOPRAZOLE SODIUM 40 MG: 40 TABLET, DELAYED RELEASE ORAL at 08:02

## 2023-02-10 RX ADMIN — Medication 10 ML: at 06:02

## 2023-02-10 RX ADMIN — HEPARIN SODIUM 5000 UNITS: 5000 INJECTION INTRAVENOUS; SUBCUTANEOUS at 06:02

## 2023-02-10 RX ADMIN — OXYCODONE HYDROCHLORIDE 10 MG: 10 TABLET ORAL at 02:02

## 2023-02-10 RX ADMIN — HYDROMORPHONE HYDROCHLORIDE 0.5 MG: 1 INJECTION, SOLUTION INTRAMUSCULAR; INTRAVENOUS; SUBCUTANEOUS at 11:02

## 2023-02-10 RX ADMIN — METHOCARBAMOL 1000 MG: 500 TABLET ORAL at 11:02

## 2023-02-10 RX ADMIN — DRONABINOL 2.5 MG: 2.5 CAPSULE ORAL at 08:02

## 2023-02-10 RX ADMIN — ACETAMINOPHEN 1000 MG: 500 TABLET ORAL at 08:02

## 2023-02-10 RX ADMIN — METHOCARBAMOL 1000 MG: 500 TABLET ORAL at 05:02

## 2023-02-10 RX ADMIN — SPIRONOLACTONE 25 MG: 25 TABLET, FILM COATED ORAL at 08:02

## 2023-02-10 RX ADMIN — MEROPENEM 2 G: 1 INJECTION INTRAVENOUS at 05:02

## 2023-02-10 RX ADMIN — HYDROMORPHONE HYDROCHLORIDE 0.5 MG: 1 INJECTION, SOLUTION INTRAMUSCULAR; INTRAVENOUS; SUBCUTANEOUS at 06:02

## 2023-02-10 RX ADMIN — GABAPENTIN 900 MG: 300 CAPSULE ORAL at 04:02

## 2023-02-10 RX ADMIN — HYDROMORPHONE HYDROCHLORIDE 0.5 MG: 1 INJECTION, SOLUTION INTRAMUSCULAR; INTRAVENOUS; SUBCUTANEOUS at 04:02

## 2023-02-10 RX ADMIN — OXYCODONE HYDROCHLORIDE 10 MG: 10 TABLET ORAL at 08:02

## 2023-02-10 RX ADMIN — HEPARIN SODIUM 5000 UNITS: 5000 INJECTION INTRAVENOUS; SUBCUTANEOUS at 10:02

## 2023-02-10 RX ADMIN — LACTULOSE 20 G: 20 SOLUTION ORAL at 11:02

## 2023-02-10 RX ADMIN — METHOCARBAMOL 1000 MG: 500 TABLET ORAL at 08:02

## 2023-02-10 RX ADMIN — HEPARIN SODIUM 5000 UNITS: 5000 INJECTION INTRAVENOUS; SUBCUTANEOUS at 04:02

## 2023-02-10 RX ADMIN — Medication 10 ML: at 01:02

## 2023-02-10 NOTE — ASSESSMENT & PLAN NOTE
- Patient with sodium   Recent Labs   Lab 02/14/23  0340 02/14/23  1508 02/15/23  0421   * 128* 128*   . sodium trending down    2/9 monitor on lasix BID.sodium trended down to 127.   2/10 sodium stable at 127 . continues with increased drain output 985ml/24h . negative balance of  2.8 L lasix on hold.  nephrology consulted.  Nephrology recs  fluid restriction to 1 L,  lasix 60 mg IV daily,  and trend sodium q12h.  diazepam discontinued   2/11  noncompliant with  fluid restriction to 1 L despite counseling. sodium trended down to 126 .received 2 doses of IV lasix 60mg. Increased Lasix IV 80 BID  due to UOP 500ml/24h   2/12 UOP of 1250ml/24h.sodium trended up to 129   2/14 sodium trended up to 130 . Diuresing well with lasix IV .   2/15 started on salt tablets 1g BID and lasix drip 10mg/h x 6h  2/16 continue Lasix 40 IVP x 1 with Lasix drip 10mg/hr x 6

## 2023-02-10 NOTE — PT/OT/SLP PROGRESS
Physical Therapy Treatment    Patient Name:  Rachel Zazueta   MRN:  [id number]    Recommendations:     Discharge Recommendations: nursing facility, skilled  Discharge Equipment Recommendations: hospital bed, lift device  Barriers to discharge: Inaccessible home and Decreased caregiver support    Assessment:     Rachel Zazueta is a 42 y.o. female admitted with a medical diagnosis of Weakness of both lower extremities.  She presents with the following impairments/functional limitations: weakness, impaired endurance, impaired self care skills, impaired functional mobility, impaired balance, decreased upper extremity function, decreased lower extremity function, decreased safety awareness, pain, decreased ROM, impaired skin, edema, impaired cardiopulmonary response to activity, orthopedic precautions.    Rehab Prognosis: Good; patient would benefit from acute skilled PT services to address these deficits and reach maximum level of function.    Recent Surgery: Procedure(s) (LRB):  LAMINECTOMY, SPINE, THORACIC, WITH FUSION (T4-Pelvis fusion w/ T9-10 corpectomies) **AIRO (N/A) 8 Days Post-Op    Plan:     During this hospitalization, patient to be seen 3 x/week to address the identified rehab impairments via therapeutic activities, therapeutic exercises, neuromuscular re-education, wheelchair management/training and progress toward the following goals:    Plan of Care Expires:  03/05/23    Subjective     Chief Complaint: pain  Pain/Comfort:  Pain Rating 1: 2/10 (pain worsens at EOB with attempt to sit pt upright at EOB)  Location - Orientation 1: generalized  Location 1: abdomen  Pain Addressed 1: Pre-medicate for activity, Reposition, Distraction  Pain Rating Post-Intervention 1: 5/10 (stable pain in supine)      Objective:     Communicated with RN prior to session.  Patient found HOB elevated with telemetry, peripheral IV, PureWick, MIHIR drain upon PTA entry to room.   Rehab technician present and assisting throughout.      General Precautions: Standard, fall, contact  Orthopedic Precautions: spinal precautions  Braces: TLSO (donned/doffed at EOB)  Respiratory Status: Nasal cannula, flow 2 L/min     Functional Mobility:  Bed Mobility:     Rolling Left:  maximal assistance  Rolling Right: maximal assistance  Supine to Sit: total assistance and of 2 persons  Sit to Supine: total assistance and of 2 persons  Transfers:     Seated Scooting: total assistance of 2 person; limited lateral movement, pt slides forward towards EOB      AM-PAC 6 CLICK MOBILITY  Turning over in bed (including adjusting bedclothes, sheets and blankets)?: 2  Sitting down on and standing up from a chair with arms (e.g., wheelchair, bedside commode, etc.): 1  Moving from lying on back to sitting on the side of the bed?: 2  Moving to and from a bed to a chair (including a wheelchair)?: 1  Need to walk in hospital room?: 1  Climbing 3-5 steps with a railing?: 1  Basic Mobility Total Score: 8       Treatment & Education:  Pt assisted with rolling and pericare d/t bowel incontinence. Pt able to assist with rolling using UE support to bed rails.   Pt assisted to EOB and sits statically with mod to max A of 2 persons for ~15 minutes. Posterior lean. Poorly maintains forward weight shift. Able to perform BLE LAQ x 10 reps to each leg, ROM limited by body habitus.   AAROM of BLE as tolerated. Limited range for hip flexion d/t abdominal pain.   Pt educated on spinal precautions and best mobility options to limit pain with supine<>sit transitions.       Patient left HOB elevated with all lines intact, call button in reach, and RN notified.     GOALS:   Multidisciplinary Problems       Physical Therapy Goals          Problem: Physical Therapy    Goal Priority Disciplines Outcome Goal Variances Interventions   Physical Therapy Goal     PT, PT/OT Ongoing, Progressing     Description: Goals to be met by: 2/4/23     Patient will increase functional independence with mobility by  performin. Supine to sit with Moderate Assistance  2. Sit to supine with Moderate Assistance  3. Rolling to Left and Right with Moderate Assistance.  4. Sit to stand transfer with Maximum Assistance                         Time Tracking:     PT Received On: 02/10/23  PT Start Time: 1010     PT Stop Time: 1050  PT Total Time (min): 40 min     Billable Minutes: Therapeutic Activity 40    Treatment Type: Treatment  PT/PTA: PTA     PTA Visit Number: 3     02/10/2023

## 2023-02-10 NOTE — CONSULTS
"[facility] - Telemetry Stepdown  Nephrology  Consult Note    Patient Name: Rachel Zazueta  MRN: [id number]  Admission Date: 1/19/2023  Hospital Length of Stay: 22 days  Attending Provider: Vincent Bal MD   Primary Care Physician: Dannielle Merino DO  Principal Problem:Weakness of both lower extremities    Inpatient consult to Nephrology  Consult performed by: Delores Claros MD  Consult ordered by: Vincent Bal MD        Subjective:     HPI: Per HM note: "42 y.o F w/ hx ofIV drug use (methamphetamine), chronic HCV with cirrhosis, spinal fusion (04/2021), epidural abscess with removal of hardware (02/2022), spinal fusion with hardware (T10 - Pelvis / 03/2022), obesity (BMI 39.3) and neurogenic bladder and recent shoulder surgery who initially presented to [facility] for evaluation of back pain and left leg weakness. She reports initially noting the left leg weakness when she was about to go to the bathroom and was about to fall but was assisted by her boyfriend.  She was brought to the ED via EMS. Urinalysis with UTI concerns and blood cultures at OSH grew ESBL E coli, and shoulder effusion concern for infection so she was treated with meropenem.  During hospitalization, she reports the weakness that started out in her left leg initially then spread upwards to her left thigh, left hip, then crossed over to the right hip and spread to her right leg.  Denies recent IV drug use. She reports her last incidence of drug use was more than 3 years ago and also denies tobacco, and alcohol abuse.  Reports cannabis use.     OSH course notable for concerning urinalysis and blood cultures positive for ESBL E coli treated with meropenem.  She was also admit to the ICU where she was treated with Precedex for significant body aches, irritability, and muscle spasms.  Concerns of a murmur on physical exam so she underwent TTE which did not show any vegetations.  Worsening lower extremity weakness workup with MRI " "head nonspecific, MRI cervical spine with multilevel spondylosis, X-ray spine with multilevel thoracolumbar fusion and diskectomy, focal kyphosis at T9-10 increased compared to prior.  She was started on prophylaxis amoxicillin due to her history of infected hardware.  MRI spine unable to be completed due to patient's body habitus so she was transferred [facility] [place] [facility] imaging and Neurosurgery, Neurology evaluation." Underwent laminectomy, posterior fusion and washout on 1/24. ID following for discitis, has her on meropenem. Stepped down to  on 2/8.     Nephrology consulted for hyponatremia. Patient reports uncomfortable abdominal distension and leg swelling. Reports drinking 3-4 cups of her 30oz water tumbler daily. Urine Na <10, urine osm 513. Serum Na trend 136--> 132-->131-->129-->127-->135-->134-->127. Normal mentation. Cr/BUN wnl. Albumin 1.8, protein 4.5, bili 1.4.          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9    CHOLECYSTECTOMY      KIDNEY SURGERY      LUMBAR FUSION Bilateral 3/2/2022    Procedure: FUSION, SPINE, LUMBAR;  Surgeon: Guille Templeton MD;  Location: [facility] OR [address] [place] [address];  Service: Neurosurgery;  Laterality: Bilateral;  AIRO  T10--Pelvis    LUMBAR FUSION N/A 1/24/2023    Procedure: **AIRO** T8-T12 POSTERIOR FUSION, T8-T10 LAMINECTOMY, HARDWARE REMOVAL AND WASHOUT;  Surgeon: Guille Templeton MD;  Location: [facility] OR [address] [place] [address];  Service: Neurosurgery;  Laterality: N/A;    REMOVAL OF HARDWARE FROM SPINE N/A 2/21/2022    Procedure: " REMOVAL, HARDWARE, SPINE;  Surgeon: Guille Templeton MD;  Location: [facility] OR 2ND FLR;  Service: Neurosurgery;  Laterality: N/A;  [place]    SPINE SURGERY      THORACIC LAMINECTOMY WITH FUSION N/A 2/2/2023    Procedure: LAMINECTOMY, SPINE, THORACIC, WITH FUSION (T4-Pelvis fusion w/ T9-10 corpectomies) **AIRO;  Surgeon: Guille Templeton MD;  Location: [facility] OR 2ND FLR;  Service: Neurosurgery;  Laterality: N/A;  AIRO, 2 bovies, aquamantys, Globus, Depuy, antibiotic beads, TXA, Peroxide; [employer] plastics closure       Review of patient's allergies indicates:   Allergen Reactions    Bee venom protein (honey bee) Anaphylaxis     Patient reports she is allergic to bee stings.    Naproxen Anaphylaxis     Throat closing    12-23- Patient reports taking Ibuprofen 200 mg at home without problems. Verified X3. [place]    Wasp sting [allergen ext-venom-honey bee] Anaphylaxis    Adhesive Blisters    Iodine and iodide containing products Hives     Allergic to iodine in seafood only    Shellfish containing products     Nuts [tree nut] Rash     Current Facility-Administered Medications   Medication Frequency    0.9%  NaCl infusion (for blood administration) Q24H PRN    0.9%  NaCl infusion (for blood administration) Q24H PRN    acetaminophen tablet 1,000 mg Q12H    bisacodyL suppository 10 mg Every other day    buPROPion TB24 tablet 300 mg Daily    dextrose 10% bolus 125 mL 125 mL PRN    dextrose 10% bolus 250 mL 250 mL PRN    dronabinoL capsule 2.5 mg BID    furosemide injection 60 mg Daily    gabapentin capsule 900 mg TID    glucagon (human recombinant) injection 1 mg PRN    glucose chewable tablet 16 g PRN    glucose chewable tablet 24 g PRN    heparin (porcine) injection 5,000 Units Q8H    hydrALAZINE tablet 25 mg Q8H PRN    HYDROmorphone injection 0.5 mg Q4H PRN    lactulose 20 gram/30 mL solution Soln 20 g TID    magnesium hydroxide 400 mg/5 ml suspension 2,400 mg Daily PRN    melatonin tablet 6 mg Nightly PRN     meropenem (MERREM) 2 g in sodium chloride 0.9% 100 mL IVPB Q8H    methocarbamoL tablet 1,000 mg Q6H    naloxone 0.4 mg/mL injection 0.02 mg PRN    ondansetron injection 4 mg Q6H PRN    oxyCODONE immediate release tablet 5 mg Q3H PRN    And    oxyCODONE immediate release tablet Tab 10 mg Q3H PRN    pantoprazole EC tablet 40 mg Daily    polyethylene glycol packet 17 g Daily PRN    prochlorperazine injection Soln 2.5 mg Q6H PRN    senna-docusate 8.6-50 mg per tablet 1 tablet BID PRN    simethicone chewable tablet 80 mg TID PRN    sodium chloride 0.9% flush 10 mL Q12H PRN    sodium chloride 0.9% flush 10 mL Q6H    And    sodium chloride 0.9% flush 10 mL PRN    spironolactone tablet 25 mg Daily     Family History       Problem Relation (Age of Onset)    Cirrhosis Father    Drug abuse Mother, Father    Hepatitis Mother, Father    Liver cancer Mother          Tobacco Use    Smoking status: Some Days     Packs/day: 0.50     Years: 23.00     Pack years: 11.50     Types: Cigarettes    Smokeless tobacco: Never    Tobacco comments:     patient states she knows she nees to quit.   Substance and Sexual Activity    Alcohol use: Not Currently     Comment: quit 2014    Drug use: Yes     Frequency: 4.0 times per week     Types: Marijuana     Comment: Patient denies needle use, only inhalation of methampehtamines or orally.  Last known drug use was prior to admission to hospital stay for surgery    Sexual activity: Yes     Partners: Male     Birth control/protection: None     Review of Systems   Cardiovascular:  Positive for leg swelling.   Gastrointestinal:  Positive for abdominal distention and abdominal pain.   Genitourinary:  Negative for decreased urine volume and hematuria.   Musculoskeletal:  Positive for back pain.   Psychiatric/Behavioral:  The patient is nervous/anxious.    Objective:     Vital Signs (Most Recent):  Temp: 97.5 °F (36.4 °C) (02/10/23 1118)  Pulse: 110 (02/10/23 1510)  Resp: 18  (02/10/23 1510)  BP: (!) 106/52 (02/10/23 1510)  SpO2: (!) 93 % (02/10/23 1510)   Vital Signs (24h Range):  Temp:  [97.5 °F (36.4 °C)-98.5 °F (36.9 °C)] 97.5 °F (36.4 °C)  Pulse:  [104-115] 110  Resp:  [16-20] 18  SpO2:  [93 %-100 %] 93 %  BP: (100-131)/(52-60) 106/52     Weight: (!) 140.5 kg (309 lb 11.9 oz) (02/10/23 0600)  Body mass index is 48.51 kg/m².  Body surface area is 2.58 meters squared.    I/O last 3 completed shifts:  In: 560 [P.O.:560]  Out: 2180 [Urine:1130; Drains:1050]    Physical Exam  Constitutional:       General: She is not in acute distress.     Appearance: Normal appearance. She is obese. She is not ill-appearing.   HENT:      Head: Normocephalic and atraumatic.   Eyes:      General: No scleral icterus.  Cardiovascular:      Rate and Rhythm: Normal rate.      Pulses: Normal pulses.   Pulmonary:      Effort: Pulmonary effort is normal.   Abdominal:      General: There is distension.      Tenderness: There is abdominal tenderness. There is no guarding.   Musculoskeletal:      Right lower leg: Edema present.      Left lower leg: Edema present.   Skin:     General: Skin is warm and dry.      Coloration: Skin is not jaundiced.   Neurological:      Mental Status: She is alert and oriented to person, place, and time.   Psychiatric:         Behavior: Behavior normal.       Significant Labs:  CBC:   Recent Labs   Lab 02/10/23  0236   WBC 5.53   RBC 2.57*   HGB 7.4*   HCT 24.4*   *   MCV 95   MCH 28.8   MCHC 30.3*     CMP:   Recent Labs   Lab 02/10/23  0236 02/10/23  0631   *  136* 135*   CALCIUM 6.9*  6.9* 7.6*   ALBUMIN 1.6*  1.6* 1.8*   PROT 4.5*  --    *  135* 127*   K 3.5  3.6 4.0   CO2 23  22* 22*     101 98   BUN 23*  23* 23*   CREATININE 0.5  0.6 0.6   ALKPHOS 142*  --    ALT 26  --    AST 36  --    BILITOT 1.4*  --      All labs within the past 24 hours have been reviewed.    Significant Imaging:  Labs: Reviewed    Assessment/Plan:     Hyponatremia  42 y.o F  w/ hx of IV drug use (meth), chronic HCV with cirrhosis, spinal fusion (04/2021), epidural abscess with removal of hardware (02/2022), spinal fusion with hardware (T10 - Pelvis / 03/2022), and neurogenic bladder here with complicated ESBL E.coli epidural abscess s/p washout,corpectomy, fixation being treated w/ meropenem.     Nephrology consulted for hyponatremia. Patient reports uncomfortable abdominal distension and leg swelling. Reports drinking 3-4 cups of her 30oz water tumbler daily. Urine Na <10, urine osm 513. Serum osm 283. Serum Na trend 136--> 132-->131-->129-->127-->135-->134-->127. Normal mentation. Cr/BUN wnl. Albumin 1.8, protein 4.5, bili 1.4.     Hyponatremia likely 2/2 to cirrhosis and hypervolemia. Possible poor solute intake relative to free water intake. Unlikely SIADH given low urine Na.     - fluid restrict to 1 L daily  - Lasix IV 60 daily, continue spironolactone 25mg daily  - Asymptomatic, no role for hypertonic saline  - Goal increase sodium 3-6 mM/24h  - q12h Na check      Chronic hepatitis C with cirrhosis  Will need to f/u with outpatient hepatology for antiviral treatment    Cirrhosis of liver with ascites  S/p para on 2/10  On lactulose, lasix and aldactone  Rest of management per primary        Thank you for your consult. I will follow-up with patient. Please contact us if you have any additional questions.    Delores Claros MD  Nephrology  [facility] - Telemetry Stepdown

## 2023-02-10 NOTE — PLAN OF CARE
Pt alert and oriented. Resp even and unlabored. Incontinent bowel episodes x 2, receiving lactulose as ordered. Abd remains distended. Midline inc to spine/back with staples ROSS. No drainage. Pain continues to be managed with Diluadid and Oxycodone. Purwick in place. Drsg change to PICC and midline using sterile technique. Bed in low position, call light within reach. Assessments ongoing.

## 2023-02-10 NOTE — ASSESSMENT & PLAN NOTE
Body mass index is 48.51 kg/m². Morbid obesity complicates all aspects of disease management from diagnostic modalities to treatment. Weight loss encouraged

## 2023-02-10 NOTE — PLAN OF CARE
02/10/23 1455   Post-Acute Status   Post-Acute Authorization Placement   Post-Acute Placement Status Pending payor review/awaiting authorization (if required)  (& medical stability)     Spoke with Elyssa ([phone]) in admissions at [facility] and notified that per MD, patient will not dc this weekend. Possible dc early next week. Elyssa reports insurance auth is still pending.    Milla Hagan LCSW  [facility]  Ext. [phone]

## 2023-02-10 NOTE — SUBJECTIVE & OBJECTIVE
Interval History: NAEON. Pain controlled significantly better today. Reports doing well with therapy today. Patient sat at EOB. Mobility and strength significantly limited due to worsening bilateral leg edema possible due to third spacing. MIHIR drains with continued high output, will keep.    Medications:  Continuous Infusions:  Scheduled Meds:   acetaminophen  1,000 mg Oral Q12H    bisacodyL  10 mg Rectal Every other day    buPROPion  300 mg Oral Daily    dronabinoL  2.5 mg Oral BID    gabapentin  900 mg Oral TID    heparin (porcine)  5,000 Units Subcutaneous Q8H    lactulose  20 g Oral TID    meropenem (MERREM) IVPB  2 g Intravenous Q8H    methocarbamoL  1,000 mg Oral Q6H    pantoprazole  40 mg Oral Daily    sodium chloride 0.9%  10 mL Intravenous Q6H    spironolactone  25 mg Oral Daily     PRN Meds:sodium chloride, sodium chloride, dextrose 10%, dextrose 10%, diazePAM, glucagon (human recombinant), glucose, glucose, hydrALAZINE, HYDROmorphone, magnesium hydroxide 400 mg/5 ml, melatonin, naloxone, ondansetron, oxyCODONE **AND** oxyCODONE, polyethylene glycol, prochlorperazine, senna-docusate 8.6-50 mg, simethicone, sodium chloride 0.9%, Flushing PICC Protocol **AND** sodium chloride 0.9% **AND** sodium chloride 0.9%     Review of Systems  Objective:     Weight: (!) 140.5 kg (309 lb 11.9 oz)  Body mass index is 48.51 kg/m².  Vital Signs (Most Recent):  Temp: 98.4 °F (36.9 °C) (02/10/23 0821)  Pulse: (!) 111 (02/10/23 1052)  Resp: 18 (02/10/23 0831)  BP: 131/60 (02/10/23 0821)  SpO2: 98 % (02/10/23 0821)   Vital Signs (24h Range):  Temp:  [97.9 °F (36.6 °C)-98.5 °F (36.9 °C)] 98.4 °F (36.9 °C)  Pulse:  [109-115] 111  Resp:  [16-20] 18  SpO2:  [95 %-100 %] 98 %  BP: ()/(52-60) 131/60     Date 02/10/23 0700 - 02/11/23 0659   Shift 8943-9103 2931-2170 5398-5916 24 Hour Total   INTAKE   Shift Total(mL/kg)       OUTPUT   Drains 20   20   Shift Total(mL/kg) 20(0.1)   20(0.1)   Weight (kg) 140.5 140.5 140.5 140.5                         Closed/Suction Drain 02/02/23 1401 Right Back Bulb 15 Fr. (Active)   Site Description Unable to view 02/10/23 0752   Dressing Type Transparent (Tegaderm) 02/10/23 0752   Dressing Status Clean;Dry;Intact 02/10/23 0752   Dressing Intervention Integrity maintained 02/10/23 0752   Drainage Serosanguineous 02/10/23 0752   Status Open to gravity drainage 02/10/23 0752   Output (mL) 10 mL 02/10/23 0545            Closed/Suction Drain 02/02/23 1402 Right Back Bulb 15 Fr. (Active)   Site Description Unable to view 02/10/23 0752   Dressing Type Transparent (Tegaderm) 02/10/23 0752   Dressing Status Dry;Clean;Intact 02/10/23 0752   Dressing Intervention Integrity maintained 02/10/23 0752   Drainage Serosanguineous 02/10/23 0752   Status To bulb suction 02/10/23 0752   Output (mL) 120 mL 02/10/23 0545            Closed/Suction Drain 02/02/23 1402 Left Back Bulb 15 Fr. (Active)   Site Description Unable to view 02/10/23 0752   Dressing Type Transparent (Tegaderm) 02/10/23 0752   Dressing Status Clean;Intact 02/10/23 0752   Dressing Intervention Integrity maintained 02/10/23 0752   Drainage Serosanguineous 02/10/23 0752   Status To bulb suction 02/10/23 0752   Output (mL) 20 mL 02/10/23 0834            Closed/Suction Drain 02/02/23 1402 Left Back Bulb 15 Fr. (Active)   Site Description Healing 02/10/23 0752   Dressing Type Transparent (Tegaderm) 02/10/23 0752   Dressing Status Intact;Dry;Clean 02/10/23 0752   Dressing Intervention Integrity maintained 02/10/23 0752   Drainage Serosanguineous 02/10/23 0752   Status Open to gravity drainage 02/10/23 0752   Output (mL) 10 mL 02/10/23 0545       Neurosurgery Physical Exam  General: well developed, well nourished, no distress.   Head: normocephalic, atraumatic  Neurologic: Alert and oriented. Thought content appropriate.  GCS: Motor: 6/Verbal: 5/Eyes: 4 GCS Total: 15  Mental Status: Awake, Alert, Oriented x 4  Language: No aphasia  Speech: No dysarthria  Cranial  nerves: face symmetric, tongue midline, CN II-XII grossly intact.   Eyes: pupils equal, round, reactive to light with accommodation, EOMI. Dysconjugate gaze.  Pulmonary: normal respirations, no signs of respiratory distress  Abdomen: soft, non-distended, not tender to palpation  Skin: Skin is warm, dry and intact.  Sensory: intact to light touch throughout  Motor Strength: Moves all extremities spontaneously.     Strength   Deltoids Triceps Biceps Wrist Extension Wrist Flexion Hand    Upper: R 5/5 5/5 5/5 5/5 5/5 5/5     L 5/5 5/5 5/5 5/5 5/5 5/5       Iliopsoas Quadriceps Knee  Flexion Tibialis  anterior Gastro- cnemius EHL   Lower: R 2/5 3/5 3/5 4+/5 4+/5 4+/5     L 2/5 3/5 3/5 4+/5 4+/5 4+/5      BLE strength/ROM limited due to pain, body habitus and severe bilateral leg edema.     Thoracolumbar incision: C/D/I with staples. Skin edges well approximated. No surrounding erythema or edema. No drainage or TTP.      MIHIR drains x 4 intact to gravity with ss output.     Significant Labs:  Recent Labs   Lab 02/09/23  0356 02/09/23  1700 02/10/23  0236 02/10/23  0631   * 106 136*  136* 135*   * 127* 134*  135* 127*   K 4.2 4.0 3.5  3.6 4.0   CL 99 98 101  101 98   CO2 24 23 23  22* 22*   BUN 21* 22* 23*  23* 23*   CREATININE 0.6 0.5 0.5  0.6 0.6   CALCIUM 7.6* 7.2* 6.9*  6.9* 7.6*   MG 1.9  --  1.8  --      Recent Labs   Lab 02/09/23  0356 02/10/23  0236   WBC 4.99 5.53   HGB 8.1* 7.4*   HCT 26.2* 24.4*   * 108*     No results for input(s): LABPT, INR, APTT in the last 48 hours.  Microbiology Results (last 7 days)       Procedure Component Value Units Date/Time    Fungus culture [[id number]] Collected: 01/24/23 0913    Order Status: Completed Specimen: Body Fluid from Back Updated: 02/08/23 0655     Fungus (Mycology) Culture Culture in progress      No fungus isolated after 2 weeks    Narrative:      1) Perispinal    Fungus culture [[id number]] Collected: 01/24/23 0943    Order Status:  Completed Specimen: Bone from Back Updated: 02/08/23 0655     Fungus (Mycology) Culture Culture in progress      No fungus isolated after 2 weeks    Narrative:      3) Perispinal    Fungus culture [[id number]] Collected: 01/24/23 0943    Order Status: Completed Specimen: Bone from Back Updated: 02/08/23 0655     Fungus (Mycology) Culture Culture in progress      No fungus isolated after 2 weeks    Narrative:      4) Perispinal    Fungus culture [[id number]] Collected: 01/24/23 0913    Order Status: Completed Specimen: Body Fluid from Back Updated: 02/08/23 0655     Fungus (Mycology) Culture Culture in progress      No fungus isolated after 2 weeks    Narrative:      2) Perispinal          All pertinent labs from the last 24 hours have been reviewed.    Significant Diagnostics:  I have reviewed and interpreted all pertinent imaging results/findings within the past 24 hours.

## 2023-02-10 NOTE — ASSESSMENT & PLAN NOTE
Pt is 42F multiple spinal surgeries and revisions last T10- Pelvis in March 2022 who presented to OSH with concern for shoulder infection and UTI found to have E coli sepsis who has had progressive worsening BLE weakness. 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 Transferred to [facility] to  and neurosurgery was consulted.    OR 1/24 for temporary T6-12 PSIF. Taz pus noted intraoperatively.   OR 2/2 for T4-pelvis revision and extension of fusion with T9-10 corpectomy with plastic surgery assistance.  Maps goals > 85 completed 2/5 in ICU.    Plan:  --Stepped down to  floor.   -q4h neurochecks.  --All labs & diagnostics reviewed.   -post op xrays pending. Obtain upright when pt able to stand or sit, prior to discharge.  --TLSO to be worn when OOB only.  --MIHIR drains x4, monitor output qshift. Keep to gravity. Monitor H/H. Transfuse for Hg <7.0 or for symptomatic anemia with Hg < 8.0.  --Plastic surgery managing incision. Open to air.   - Off-load pressure as much as possible to promote wound healing.   - Elevate with wedge, alternate sides Q2H  --Thoracic discitis: BCx 1/20 no growth. OR cultures 1/24 grew ESBL E. coli   -- ID consulted. Recs for IV meropenem with estimated end date of 3/29/23.  --Pain Control: Continue multimodal regimen. PCA pump discontinued 2/8 with poor pain control subsequently.    - Anesthesia Pain Service consulted due to refractory pain, appreciate assistance.   - No NSAIDs as pt s/p recent instrumented fusion.  --DVT PPx: SQH/TEDs/SCDs.  --NSGY will continue to follow. Please contact team with any further questions.

## 2023-02-10 NOTE — ASSESSMENT & PLAN NOTE
42 year old woman s/p multiple spinal surgeries presented to OSH with possible infection of shoulder. Found to have UTI and E. Coli bacteremia and progressively worse BLE weakness. Transferred to [facility] for neurosurgical evaluation. Underwent Laminectomy T8-T10; Posterior spinal fusion T8-T12; hardware removal and washout on 1/24. Admitted to [facility] postop. On 2/2 underwent T4-pelvis fusion and T9-T10 corpectomies. To complete 8 week course of meropenem per ID for ESBL E coli from bone culture, end date 3/29.     - neuro checks q4h  - HV drains x 2 in place, management per NSGY  - meropenem for ESBL bacteremia per ID, eot 3/29/23  - Plastic surgery follow, wound vac discontinued  - CMP, Mag, Phos, CBC daily  - MAP goals >65, SBP < 180  - PRN labetalol, hydralazine  - MM pain regimen: PRN Dilaudid, Tylenol, gabapentin, robaxin, PRN oxycodone  - Aggressive bowel regimen  - PT/OT   2/9   On meropenem for MDR-ESCHERICHIA COLI ESBL  sodium trended down to 129.  Neurosurgery following post OR and aiding in pain management. PCA discontinued 2/8 . On oxycodone 10mg PO q4h . requiring IV dilaudid q4 as needed,. drains to gravity output 990ml/24h . Plastic surgery following flap, managing wounds.  Wound VAC discontinued on Wednesday.  accepted to [facility]   2/11 needs post op xrays when able to stand or sit, prior to discharge  2/15  CT abdomen done overnight as well - Postop change in the spine.  There is lucency about the bi iliac screws concerning for loosening.  Bony destruction T10 with interbody strut. neurosurgery f/u

## 2023-02-10 NOTE — HPI
"Per  note: "42 y.o F w/ hx ofIV drug use (methamphetamine), chronic HCV with cirrhosis, spinal fusion (04/2021), epidural abscess with removal of hardware (02/2022), spinal fusion with hardware (T10 - Pelvis / 03/2022), obesity (BMI 39.3) and neurogenic bladder and recent shoulder surgery who initially presented to [facility] for evaluation of back pain and left leg weakness. She reports initially noting the left leg weakness when she was about to go to the bathroom and was about to fall but was assisted by her boyfriend.  She was brought to the ED via EMS. Urinalysis with UTI concerns and blood cultures at OSH grew ESBL E coli, and shoulder effusion concern for infection so she was treated with meropenem.  During hospitalization, she reports the weakness that started out in her left leg initially then spread upwards to her left thigh, left hip, then crossed over to the right hip and spread to her right leg.  Denies recent IV drug use. She reports her last incidence of drug use was more than 3 years ago and also denies tobacco, and alcohol abuse.  Reports cannabis use.     OSH course notable for concerning urinalysis and blood cultures positive for ESBL E coli treated with meropenem.  She was also admit to the ICU where she was treated with Precedex for significant body aches, irritability, and muscle spasms.  Concerns of a murmur on physical exam so she underwent TTE which did not show any vegetations.  Worsening lower extremity weakness workup with MRI head nonspecific, MRI cervical spine with multilevel spondylosis, X-ray spine with multilevel thoracolumbar fusion and diskectomy, focal kyphosis at T9-10 increased compared to prior.  She was started on prophylaxis amoxicillin due to her history of infected hardware.  MRI spine unable to be completed due to patient's body habitus so [place] [facility] [place] and Neurosurgery, Neurology evaluation." Underwent laminectomy, posterior " fusion and washout on 1/24. ID following for discitis, has her on meropenem. Stepped down to  on 2/8.     Nephrology consulted for hyponatremia. Patient reports uncomfortable abdominal distension and leg swelling. Reports drinking 3-4 cups of her 30oz water tumbler daily. Urine Na <10, urine osm 513. Serum Na trend 136--> 132-->131-->129-->127-->135-->134-->127. Normal mentation. Cr/BUN wnl. Albumin 1.8, protein 4.5, bili 1.4.

## 2023-02-10 NOTE — PROGRESS NOTES
[facility] - Telemetry [facility] Medicine  Progress Note    Patient Name: Rachel Zazueta  MRN: [id number]  Patient Class: IP- Inpatient   Admission Date: 1/19/2023  Length of Stay: 22 days  Attending Physician: Vincent Bla MD  Primary Care Provider: Dannielle Merino DO        Subjective:     Principal Problem:Weakness of both lower extremities        HPI:  Ms. Zazueta is a 42 y.o F w/ hx ofIV drug use (methamphetamine), chronic HCV with cirrhosis, spinal fusion (04/2021), epidural abscess with removal of hardware (02/2022), spinal fusion with hardware (T10 - Pelvis / 03/2022), obesity (BMI 39.3) and neurogenic bladder who initially presented to [facility] for evaluation of back pain and left leg weakness.  She reports initially noting the left leg weakness when she was about to go to the bathroom and was about to fall but was assisted by her boyfriend.  She was brought to the ED via EMS.  Urinalysis with UTI concerns and blood cultures at OSH grew ESBL E coli so she was treated with meropenem.  During hospitalization, she reports the weakness that started out in her left leg initially then spread upwards to her left thigh, left hip, then crossed over to the right hip and spread to her right leg.  Denies recent IV drug use. She reports her last incidence of drug use was more than 3 years ago and also denies tobacco, and alcohol abuse.  Reports cannabis use.    She also reports more than 10 years ago she had an episode of a headache that lasted about 4 days and was associated with residual defects of a strabismus in the left eye with associated visual disturbances.  She also reports over the past few years her friends have noticed that she sometimes has word-finding difficulty during conversations.     Reports shortness of breath when sitting up, fever, chills, back pain, lower extremity weakness(initally L>R, but now R>L), diarrhea.  Denies cough, nausea, vomiting, constipation, bladder or  bowel incontinence, dysuria, dark urine, new vision changes.    OSH course notable for concerning urinalysis and blood cultures positive for ESBL E coli treated with meropenem.  She was also admit to the ICU where she was treated with Precedex for significant body aches, irritability, and muscle spasms.  Concerns of a murmur on physical exam so she underwent TTE which did not show any vegetations.  Worsening lower extremity weakness workup with MRI head nonspecific, MRI cervical spine with multilevel spondylosis, X-ray spine with multilevel thoracolumbar fusion and diskectomy, focal kyphosis at T9-10 increased compared to prior.  She was started on prophylaxis amoxicillin due to her history of infected hardware.  MRI spine unable to be completed due to patient's body habitus so she was transferred to [facility] [place] [facility] for further imaging and Neurosurgery, Neurology evaluation.      Overview/Hospital Course:  Pt admitted as a transfer from [facility] [facility] [place] for worsening LE weakness, concern for spinal infection, and need for MRI which could not be done at OSH. Imaging was completed here. Worsening proximal junctional kyphosis noted at T9-10 noted with concern for discitis at T8-9, T9-10. NSGY evaluated.     Found to have T8-T10 kyphosis on MRI. 1/24 underwent laminectomy T8-T10; posterior fusion T8-T12; and washout. 2/2 underwent T4-pelvis fusion and T9-T10 corpectomy.  ID consulted for thoracic discitis infection.  Patient to complete 8 weeks of therapy with meropenem. Patient underwent flap closure with Plastic surgery.  Step-down Hospital Medicine on 02/08.    2/9   On meropenem for MDR-ESCHERICHIA COLI ESBL  sodium trended down to 129.  Neurosurgery following post OR and aiding in pain management. PCA discontinued 2/8 . On oxycodone 10mg PO q4h . requiring IV dilaudid q4 as needed,. drains to gravity output 990ml/24h . Plastic surgery following flap, managing wounds.  Wound VAC discontinued on Wednesday.  accepted to [facility] pain  management consulted for back ache- switched to multimodal therapy. sodium trended down to 127.   2/10 sodium stable at 127 . continues with increased drain output 985ml/24h . negative balance of  2.8 L lasix on hold.  nephrology consulted. abdominal X ray -  Nonobstructive bowel gas pattern.  No free air.  As before, mild gaseous distension of stomach. ammonia at 57 . sono abdomen  . tylenol changed to 1000mg q12h with cirrhosis . sono abdomen -Cirrhotic liver morphology.  Sequela of portal hypertension including moderate ascites, recanalized umbilical vein.  No focal hepatic lesions. Cholecystectomy. . Nephrology recs  fluid restriction to 1 L,  lasix 60 mg IV daily,  and trend sodium q12h.  diazepam discontinued . s/p IR paracentesis today         Review of Systems:   Pain scale: 8/10   Constitutional:  fever,  chills, headache, vision loss, hearing loss, weight loss, Generalized weakness, falls, loss of smell, loss of taste, poor appetite,  sore throat  Respiratory: cough, shortness of breath.   Cardiovascular: chest pain, dizziness, palpitations, orthopnea, swelling of feet, syncope  Gastrointestinal: nausea, vomiting, abdominal pain, diarrhea, black stool,  blood in stool, change in bowel habits  Genitourinary: hematuria, dysuria, urgency, frequency  Integument/Breast: rash,  pruritis,  Surgical wound - back   Hematologic/Lymphatic: easy bruising, lymphadenopathy  Musculoskeletal: arthralgias , myalgias, back pain, neck pain, knee pain  Neurological: confusion, seizures, tremors, slurred speech  Behavioral/Psych:  depression, anxiety, auditory or visual hallucinations     OBJECTIVE:     Physical Exam:  Body mass index is 48.51 kg/m².    Constitutional: Appears well-developed and well-nourished. severe obesit y  Head: Normocephalic and atraumatic.   Neck: Normal range of motion. Neck supple.   Cardiovascular: Normal heart rate.  Regular heart rhythm.  Pulmonary/Chest: Effort normal.   Abdominal:+  distension.   No tenderness  Musculoskeletal: Normal range of motion. ++ edema. drains in place   Neurological: Alert and oriented to person, place, and time. LE strength - 3/5   Skin: Skin is warm and dry.   Psychiatric: Normal mood and affect. Behavior is normal.                  Vital Signs  Temp: 97.5 °F (36.4 °C) (02/10/23 1118)  Pulse: 109 (02/10/23 1442)  Resp: 18 (02/10/23 1400)  BP: (Abnormal) 122/59 (02/10/23 1400)  SpO2: 96 % (02/10/23 1400)     24 Hour VS Range    Temp:  [97.5 °F (36.4 °C)-98.5 °F (36.9 °C)]   Pulse:  [104-115]   Resp:  [16-20]   BP: ()/(52-60)   SpO2:  [95 %-100 %]     Intake/Output Summary (Last 24 hours) at 2/10/2023 1510  Last data filed at 2/10/2023 0834  Gross per 24 hour   Intake 440 ml   Output 1685 ml   Net -1245 ml         I/O This Shift:  I/O this shift:  In: -   Out: 20 [Drains:20]    Wt Readings from Last 3 Encounters:   02/10/23 (Abnormal) 140.5 kg (309 lb 11.9 oz)   01/19/23 110.6 kg (243 lb 12.8 oz)   01/19/23 110.6 kg (243 lb 13.3 oz)       I have personally reviewed the vitals and recorded Intake/Output     Laboratory/Diagnostic Data:    CBC/Anemia Labs: Coags:    Recent Labs   Lab 02/08/23 0455 02/09/23  0356 02/10/23  0236   WBC 5.54 4.99 5.53   HGB 7.7* 8.1* 7.4*   HCT 25.4* 26.2* 24.4*   * 114* 108*   MCV 96 95 95   RDW 21.2* 21.1* 20.7*    Recent Labs   Lab 02/06/23  1047   INR 1.3*   APTT 36.6*        Chemistries: ABG:   Recent Labs   Lab 02/08/23  0455 02/09/23  0356 02/09/23  1700 02/10/23  0236 02/10/23  0631   * 129* 127* 134*  135* 127*   K 4.0 4.2 4.0 3.5  3.6 4.0    99 98 101  101 98   CO2 23 24 23 23  22* 22*   BUN 20 21* 22* 23*  23* 23*   CREATININE 0.5 0.6 0.5 0.5  0.6 0.6   CALCIUM 7.4* 7.6* 7.2* 6.9*  6.9* 7.6*   PROT 4.9* 5.1*  --  4.5*  --    BILITOT 1.6* 1.7*  --  1.4*  --    ALKPHOS 128 149*  --  142*  --    ALT 31 31  --  26  --    AST 46* 42*  --  36  --    MG 1.9 1.9  --  1.8  --    PHOS 3.0 2.8 2.8 1.7*  1.6* 3.0    No  results for input(s): PH, PCO2, PO2, HCO3, POCSATURATED, BE in the last 168 hours.       POCT Glucose: HbA1c:    No results for input(s): POCTGLUCOSE in the last 168 hours. Hemoglobin A1C   Date Value Ref Range Status   04/16/2021 4.6 4.0 - 5.6 % Final     Comment:     ADA Screening Guidelines:  5.7-6.4%  Consistent with prediabetes  >or=6.5%  Consistent with diabetes    High levels of fetal hemoglobin interfere with the HbA1C  assay. Heterozygous hemoglobin variants (HbS, HgC, etc)do  not significantly interfere with this assay.   However, presence of multiple variants may affect accuracy.     05/14/2019 4.2 4.0 - 5.6 % Final     Comment:     ADA Screening Guidelines:  5.7-6.4%  Consistent with prediabetes  >or=6.5%  Consistent with diabetes  High levels of fetal hemoglobin interfere with the HbA1C  assay. Heterozygous hemoglobin variants (HbS, HgC, etc)do  not significantly interfere with this assay.   However, presence of multiple variants may affect accuracy.     11/24/2018 4.2 4.0 - 5.6 % Final     Comment:     ADA Screening Guidelines:  5.7-6.4%  Consistent with prediabetes  >or=6.5%  Consistent with diabetes  High levels of fetal hemoglobin interfere with the HbA1C  assay. Heterozygous hemoglobin variants (HbS, HgC, etc)do  not significantly interfere with this assay.   However, presence of multiple variants may affect accuracy.          Cardiac Enzymes: Ejection Fractions:    Recent Labs     02/08/23  2258   TROPONINI <0.006    EF   Date Value Ref Range Status   01/20/2023 58 % Final   12/23/2022 64 % Final          No results for input(s): COLORU, APPEARANCEUA, PHUR, SPECGRAV, PROTEINUA, GLUCUA, KETONESU, BILIRUBINUA, OCCULTUA, NITRITE, UROBILINOGEN, LEUKOCYTESUR, RBCUA, WBCUA, BACTERIA, SQUAMEPITHEL, HYALINECASTS in the last 48 hours.    Invalid input(s): WRIGHTSUR    Procalcitonin (ng/mL)   Date Value   02/14/2022 0.05   02/14/2022 0.06   04/14/2021 0.04   11/24/2018 0.37 (H)     Lactate (Lactic Acid)  (mmol/L)   Date Value   01/10/2023 2.0   12/23/2022 1.8   12/23/2022 3.6 (HH)   11/14/2022 1.4   02/14/2022 1.1     BNP (pg/mL)   Date Value   11/24/2018 90   03/14/2017 87     CRP   Date Value   01/20/2023 9.4 mg/L (H)   01/10/2023 3.47 mg/dL (H)   11/16/2022 45.9 mg/L (H)   11/14/2022 11.10 mg/dL (H)   07/23/2022 1.93 mg/dL (H)   07/01/2022 1.53 mg/dL (H)   02/24/2022 7.1 mg/L   02/14/2022 29.2 mg/L (H)   02/14/2022 30.8 mg/L (H)   04/14/2021 47.5 mg/L (H)     Sed Rate (mm/Hr)   Date Value   01/20/2023 70 (H)   01/10/2023 55 (H)   11/16/2022 84 (H)   11/14/2022 67 (H)   02/24/2022 8   02/14/2022 57 (H)   02/14/2022 82 (H)   04/14/2021 57 (H)   05/13/2019 35 (H)   03/21/2017 21 (H)     No results found for: DDIMER  Ferritin (ng/mL)   Date Value   02/07/2022 126   03/21/2017 52   03/14/2017 67   12/06/2016 80     LD (U/L)   Date Value   04/24/2021 250   03/14/2017 253     Troponin I (ng/mL)   Date Value   02/08/2023 <0.006   11/24/2018 <0.006   03/20/2017 0.01   03/14/2017 <0.01     CPK (U/L)   Date Value   01/20/2023 48   02/14/2022 64   03/15/2017 44     No results found for this or any previous visit.  SARS-CoV2 (COVID-19) Qualitative PCR (no units)   Date Value   03/14/2022 Not Detected   02/20/2022 Not Detected   04/27/2021 Not Detected   04/22/2021 Not Detected     SARS-CoV-2 RNA, Amplification, Qual (no units)   Date Value   03/02/2022 Negative     POC Rapid COVID (no units)   Date Value   12/23/2022 Negative   12/10/2022 Negative   02/14/2022 Negative   04/14/2021 Negative       Microbiology labs for the last week  Microbiology Results (last 7 days)       Procedure Component Value Units Date/Time    Aerobic culture [[id number]]     Order Status: No result Specimen: Ascites     Culture, Anaerobic [[id number]]     Order Status: No result Specimen: Ascites     Gram stain [[id number]]     Order Status: No result Specimen: Ascites     Fungus culture [[id number]] Collected: 01/24/23 0913    Order Status: Completed  Specimen: Body Fluid from Back Updated: 02/08/23 0655     Fungus (Mycology) Culture Culture in progress      No fungus isolated after 2 weeks    Narrative:      1) Perispinal    Fungus culture [[id number]] Collected: 01/24/23 0943    Order Status: Completed Specimen: Bone from Back Updated: 02/08/23 0655     Fungus (Mycology) Culture Culture in progress      No fungus isolated after 2 weeks    Narrative:      3) Perispinal    Fungus culture [[id number]] Collected: 01/24/23 0943    Order Status: Completed Specimen: Bone from Back Updated: 02/08/23 0655     Fungus (Mycology) Culture Culture in progress      No fungus isolated after 2 weeks    Narrative:      4) Perispinal    Fungus culture [[id number]] Collected: 01/24/23 0913    Order Status: Completed Specimen: Body Fluid from Back Updated: 02/08/23 0655     Fungus (Mycology) Culture Culture in progress      No fungus isolated after 2 weeks    Narrative:      2) Perispinal            Reviewed and noted in plan where applicable- Please see chart for full lab data.    Lines/Drains:  PICC Double Lumen 01/26/23 1209 left basilic (Active)   Line Necessity Review Longterm central access required 02/08/23 1948   Verification by X-ray Yes 02/08/23 1948   Site Assessment No warmth;No swelling 02/08/23 1948   Extremity Assessment Distal to IV No abnormal discoloration 02/08/23 1948   Line Securement Device Secured with sutureless device 02/08/23 0730   Dressing Type Biopatch in place 02/08/23 1948   Dressing Status Clean;Dry;Intact 02/08/23 1948   Dressing Intervention Integrity maintained 02/08/23 1948   Date on Dressing 02/06/23 02/08/23 1948   Dressing Due to be Changed 02/13/23 02/08/23 1948   Distal Patency/Care flushed w/o difficulty 02/08/23 1948   Proximal 1 Patency/Care flushed w/o difficulty 02/08/23 1948   Current Insertion Depth (cm) 39 cm 01/26/23 1207   Current Exposed Catheter (cm) 0 cm 01/26/23 1207   Extremity Circumference (cm) 35 cm 01/26/23 1207   Waveform  Not being transduced 02/08/23 0730   Number of days: 13            Midline Catheter Insertion/Assessment  - Single Lumen 01/12/23 2138 Right basilic vein (medial side of arm) 18g x 10cm (Active)   $ Midline Charges (Upon insertion) Bedside Insertion Performed Pt > 3 Years Old;Midline Catheter (Supply);Ultrasound Guide for Vascular Access 01/12/23 2142   Site Assessment Clean;Dry;Intact 02/08/23 1948   IV Device Securement catheter securement device 02/08/23 1948   Line Status Saline locked 02/08/23 1948   Dressing Type Biopatch in place 02/08/23 1948   Dressing Status Clean;Dry;Intact 02/08/23 1948   Dressing Intervention Integrity maintained 02/08/23 0730   Dressing Change Due 02/06/23 02/08/23 0730   Site Change Due 02/13/23 02/08/23 0730   Reason Not Rotated Not due 02/08/23 0730   Number of days: 27            Closed/Suction Drain 02/02/23 1401 Right Back Bulb 15 Fr. (Active)   Site Description Healing 02/08/23 1948   Dressing Type Transparent (Tegaderm) 02/08/23 1948   Dressing Status Clean;Dry;Intact 02/08/23 1948   Dressing Intervention Integrity maintained 02/08/23 1948   Drainage Serosanguineous 02/08/23 1948   Status Open to gravity drainage 02/08/23 1948   Output (mL) 10 mL 02/09/23 0530   Number of days: 6            Closed/Suction Drain 02/02/23 1402 Right Back Bulb 15 Fr. (Active)   Site Description Healing 02/08/23 1948   Dressing Type Transparent (Tegaderm) 02/08/23 1948   Dressing Status Clean;Dry;Intact 02/08/23 1948   Dressing Intervention Integrity maintained 02/08/23 1948   Drainage Serosanguineous 02/08/23 1948   Status Open to gravity drainage 02/08/23 1948   Output (mL) 30 mL 02/09/23 0530   Number of days: 6            Closed/Suction Drain 02/02/23 1402 Left Back Bulb 15 Fr. (Active)   Site Description Healing 02/08/23 1948   Dressing Type Transparent (Tegaderm) 02/08/23 1948   Dressing Status Clean;Dry;Intact 02/08/23 1948   Dressing Intervention Integrity maintained 02/08/23 1948    Drainage Serosanguineous 02/08/23 1948   Status Open to gravity drainage 02/08/23 1948   Output (mL) 15 mL 02/09/23 0530   Number of days: 6            Closed/Suction Drain 02/02/23 1402 Left Back Bulb 15 Fr. (Active)   Site Description Healing 02/08/23 1948   Dressing Type Transparent (Tegaderm) 02/08/23 1948   Dressing Status Clean;Dry;Intact 02/08/23 1948   Dressing Intervention Integrity maintained 02/08/23 1948   Drainage Serosanguineous 02/08/23 1948   Status Open to gravity drainage 02/08/23 1948   Output (mL) 10 mL 02/09/23 0530   Number of days: 6       Imaging      Results for orders placed during the hospital encounter of 01/19/23    Echo Saline Bubble? Yes    Interpretation Summary  · Study is negative for intercardiac shunt that is right to left ( see text).  · The left ventricle is normal in size with concentric remodeling and normal systolic function.  · The estimated ejection fraction is 58%.  · There are segmental left ventricular wall motion abnormalities.  · Normal left ventricular diastolic function.  · Normal right ventricular size with normal right ventricular systolic function.  · Mild right atrial enlargement.  · Normal central venous pressure (3 mmHg).  · The estimated PA systolic pressure is 26 mmHg.      US Abdomen Limited  Narrative: EXAMINATION:  US ABDOMEN LIMITED    CLINICAL HISTORY:  RUQ pain;    TECHNIQUE:  Limited ultrasound of the right upper quadrant of the abdomen (including pancreas, liver, gallbladder, common bile duct, and spleen) was performed.    COMPARISON:  Abdominal ultrasound 01/20/2023.  CT renal stone 04/09/2022.    FINDINGS:  Pancreas: Mostly obscured by overlying bowel gas.  Visualized portions of the pancreas appear unremarkable.    Liver: Normal in size, measuring 12.7 cm. Nodular hepatic contour with coarsened parenchymal echotexture, suggesting cirrhosis.  Hepatorenal index measures 1.1.  No focal hepatic lesions.    Gallbladder: The gallbladder is surgically  absent.    Biliary system: The common duct is not dilated, measuring 3 mm.  No intrahepatic ductal dilatation.    Spleen: Upper normal in size and homogeneous echotexture, measuring 11.6 x 7.9 cm.    Miscellaneous: Moderate ascites.  Right pleural effusion.  Recanalized umbilical vein.  Impression: Cirrhotic liver morphology.  Sequela of portal hypertension including moderate ascites, recanalized umbilical vein.  No focal hepatic lesions.    Cholecystectomy.    Right pleural effusion.    Electronically signed by resident: Calvin Betancourt  Date:    02/10/2023  Time:    09:21    Electronically signed by: Curt Carnes  Date:    02/10/2023  Time:    09:51  X-Ray Abdomen AP 1 View  Narrative: EXAMINATION:  XR ABDOMEN AP 1 VIEW    CLINICAL HISTORY:  pain;    TECHNIQUE:  AP View(s) of the abdomen was performed.    COMPARISON:  February 6, 2023    FINDINGS:  Stable partially visualized extensive spinal hardware-surgical changes.  As before, there is seen to be paralleling lucency with respect to bilateral screws transfixing SI joints.  Clinical correlation.  Reconfirmed areas of skin sutures.  Reconfirmed partially visualized the some small opaque drains.  Additional EKG leads superimpose upper abdomen.  Nonobstructive bowel gas pattern.  No free air.  As before, mild gaseous distension of stomach.  Impression: As above    Electronically signed by: Brandan Aguilar  Date:    02/10/2023  Time:    08:11      Labs, Imaging, EKG and Diagnostic results from 2/10/2023 were reviewed.    Medications:  Medication list was reviewed and changes noted under Assessment/Plan.  No current facility-administered medications on file prior to encounter.     Current Outpatient Medications on File Prior to Encounter   Medication Sig Dispense Refill    albuterol (ACCUNEB) 1.25 mg/3 mL Nebu Take 3 mLs (1.25 mg total) by nebulization every 6 (six) hours as needed (shortness of breath). Rescue 75 mL 0    buPROPion (WELLBUTRIN) 100 MG tablet Take 1  tablet (100 mg total) by mouth 2 (two) times daily. 60 tablet 2    folic acid (FOLVITE) 1 MG tablet Take 1 tablet (1 mg total) by mouth once daily. (Patient not taking: Reported on 11/25/2022) 42 tablet 0    gabapentin (NEURONTIN) 300 MG capsule Take 1 capsule (300 mg total) by mouth 3 (three) times daily. 90 capsule 11    lactulose (CHRONULAC) 20 gram/30 mL Soln Take 30 mLs (20 g total) by mouth 3 (three) times daily. 2700 mL 2    pantoprazole (PROTONIX) 40 MG tablet Take 1 tablet (40 mg total) by mouth once daily. 30 tablet 1    [DISCONTINUED] diclofenac sodium (VOLTAREN) 1 % Gel Apply 2 g topically 4 (four) times daily. 50 g 0     Scheduled Medications:  acetaminophen, 1,000 mg, Oral, Q12H  albumin human 25%, , ,   bisacodyL, 10 mg, Rectal, Every other day  buPROPion, 300 mg, Oral, Daily  dronabinoL, 2.5 mg, Oral, BID  furosemide (LASIX) injection, 60 mg, Intravenous, Daily  gabapentin, 900 mg, Oral, TID  heparin (porcine), 5,000 Units, Subcutaneous, Q8H  lactulose, 20 g, Oral, TID  meropenem (MERREM) IVPB, 2 g, Intravenous, Q8H  methocarbamoL, 1,000 mg, Oral, Q6H  pantoprazole, 40 mg, Oral, Daily  sodium chloride 0.9%, 10 mL, Intravenous, Q6H  spironolactone, 25 mg, Oral, Daily      PRN: sodium chloride, sodium chloride, dextrose 10%, dextrose 10%, glucagon (human recombinant), glucose, glucose, hydrALAZINE, HYDROmorphone, magnesium hydroxide 400 mg/5 ml, melatonin, naloxone, ondansetron, oxyCODONE **AND** oxyCODONE, polyethylene glycol, prochlorperazine, senna-docusate 8.6-50 mg, simethicone, sodium chloride 0.9%, Flushing PICC Protocol **AND** sodium chloride 0.9% **AND** sodium chloride 0.9%  Infusions:   Estimated Creatinine Clearance: 179.7 mL/min (based on SCr of 0.6 mg/dL).    Assessment/Plan:      * Weakness of both lower extremities  42 year old woman s/p multiple spinal surgeries presented to OSH with possible infection of shoulder. Found to have UTI and E. Coli bacteremia and progressively worse BLE  weakness. Transferred to [facility] for neurosurgical evaluation. Underwent Laminectomy T8-T10; Posterior spinal fusion T8-T12; hardware removal and washout on 1/24. Admitted to [facility] postop. On 2/2 underwent T4-pelvis fusion and T9-T10 corpectomies. To complete 8 week course of meropenem per ID for ESBL E coli from bone culture, end date 3/29.     - neuro checks q4h  - HV drains x 2 in place, management per NSGY  - meropenem for ESBL bacteremia per ID, eot 3/29/23  - Plastic surgery follow, wound vac discontinued  - CMP, Mag, Phos, CBC daily  - MAP goals >65, SBP < 180  - PRN labetalol, hydralazine  - MM pain regimen: PRN Dilaudid, Tylenol, gabapentin, robaxin, PRN oxycodone  - Aggressive bowel regimen  - PT/OT   2/9   On meropenem for MDR-ESCHERICHIA COLI ESBL  sodium trended down to 129.  Neurosurgery following post OR and aiding in pain management. PCA discontinued 2/8 . On oxycodone 10mg PO q4h . requiring IV dilaudid q4 as needed,. drains to gravity output 990ml/24h . Plastic surgery following flap, managing wounds.  Wound VAC discontinued on Wednesday.  accepted to [facility]     Ascites due to alcoholic cirrhosis  2/10  . sono abdomen -Cirrhotic liver morphology.  Sequela of portal hypertension including moderate ascites, recanalized umbilical vein.  No focal hepatic lesions. Cholecystectomy. IR paracentesis ordered       Abdominal pain  2/10  abdominal X ray -  Nonobstructive bowel gas pattern.  No free air.  As before, mild gaseous distension of stomach. ammonia at 57 . sono abdomen to rule out ascitis . simethicone PRN      Hyponatremia  - Patient with sodium   Recent Labs   Lab 02/09/23  1700 02/10/23  0236 02/10/23  0631   * 134*  135* 127*   . sodium trending down    2/9 monitor on lasix BID.sodium trended down to 127.   2/10 sodium stable at 127 . continues with increased drain output 985ml/24h . negative balance of  2.8 L lasix on hold.  nephrology consulted.  Nephrology recs  fluid restriction to 1 L,   lasix 60 mg IV daily,  and trend sodium q12h.  diazepam discontinued     Acute blood loss anemia  Hb 6.4 on 2/6, received 1U pRBCs. Hb 2/7 WNL.  - monitor Hb  - transfuse to maintain Hb >7  2/9    Patient's with Normocytic anemia.. Hemoglobin stable. Etiology likely due to chronic disease .  Current CBC reviewed-    Recent Labs   Lab 02/08/23  0455 02/09/23  0356 02/10/23  0236   HGB 7.7* 8.1* 7.4*    Monitor CBC and transfuse if H/H drops below 7/21.       Kyphosis of thoracolumbar region  See weakness    Thoracic discitis  See Weakness of both lower extremities    Anxiety and depression  Evaluated by Psychiatry at OSH prior to transfer. Recommended increasing bupropion.  - Continue bupropion 300 mg QD  - Gabapentin 600 mg TID for anxiety and neuropathy  - PRN diazepam 5 mg q6h      Severe obesity (BMI >= 40)  Body mass index is 48.51 kg/m². Morbid obesity complicates all aspects of disease management from diagnostic modalities to treatment. Weight loss encouraged        Substance use disorder  Denies IV drug use (meth) for past 3 years. Denies alcohol and tobacco abuse.     Chronic hepatitis C with cirrhosis  See above    Cirrhosis of liver with ascites  MELD-Na score: 11 at 2/8/2023  4:55 AM  MELD score: 11 at 2/8/2023  4:55 AM  Calculated from:  Serum Creatinine: 0.5 mg/dL (Using min of 1 mg/dL) at 2/8/2023  4:55 AM  Serum Sodium: 131 mmol/L at 2/8/2023  4:55 AM  Total Bilirubin: 1.6 mg/dL at 2/8/2023  4:55 AM  INR(ratio): 1.3 at 2/6/2023 10:47 AM  Age: 42 years      Patient has reportedly refused transplant evaluation in the past.   - Daily CMP and trend LFTs  - Continue Lactulose 20 g TID  - Lasix 40 mg PO BID  - Will need Hepatology follow up outpatient  2/10   ammonia at 57 . sono abdomen  . tylenol changed to 1000mg q12h with cirrhosis . s/p IR paracentesis today          VTE Risk Mitigation (From admission, onward)           Ordered     heparin (porcine) injection 5,000 Units  Every 8 hours          02/04/23 0848     IP VTE HIGH RISK PATIENT  Once         01/19/23 2153     Place sequential compression device  Until discontinued         01/19/23 2153     Reason for No Pharmacological VTE Prophylaxis  Once        Question:  Reasons:  Answer:  Physician Provided (leave comment)  Comment:  Potential NSGY procedure    01/19/23 2153                    Discharge Planning   SHIRA: 2/14/2023     Code Status: Partial Code   Is the patient medically ready for discharge?: No    Reason for patient still in hospital (select all that apply): Treatment  Discharge Plan A: Long-term acute care facility (LTAC)   Discharge Delays: None known at this time              Vincent Bal MD  Department of Hospital Medicine   [facility] - Telemetry Stepdown

## 2023-02-10 NOTE — ASSESSMENT & PLAN NOTE
MELD-Na score: 11 at 2/8/2023  4:55 AM  MELD score: 11 at 2/8/2023  4:55 AM  Calculated from:  Serum Creatinine: 0.5 mg/dL (Using min of 1 mg/dL) at 2/8/2023  4:55 AM  Serum Sodium: 131 mmol/L at 2/8/2023  4:55 AM  Total Bilirubin: 1.6 mg/dL at 2/8/2023  4:55 AM  INR(ratio): 1.3 at 2/6/2023 10:47 AM  Age: 42 years      Patient has reportedly refused transplant evaluation in the past.   - Daily CMP and trend LFTs  - Continue Lactulose 20 g TID  - Lasix 40 mg PO BID  - Will need Hepatology follow up outpatient  2/10   ammonia at 57 . sono abdomen  . tylenol changed to 1000mg q12h with cirrhosis . s/p IR paracentesis today   2/11no evidence of SBP on paracentesis. 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 AFB and Triglyerides on ascitic fluid.    2/13 s/p paracentesis. Removed 4,700 ml. DVT sonogram LE negative.2/13 s/p paracentesis. Removed 4,700 ml. DVT sonogram LE negative. s/p psychiatry eval  for anxiety/depression. xanax discontinued. Decreased Bupropion to 150 mg PO Daily . Started Duloxetine 30 mg PO BID and  PRN Hydroxyzine 25 mg PO q8h for anxiety

## 2023-02-10 NOTE — ASSESSMENT & PLAN NOTE
Evaluated by Psychiatry at OSH prior to transfer. Recommended increasing bupropion.  - Continue bupropion 300 mg QD  - Gabapentin 600 mg TID for anxiety and neuropathy  - PRN diazepam 5 mg q6h discontinued  2/13   s/p psychiatry eval  for anxiety/depression. xanax discontinued. Decreased Bupropion to 150 mg PO Daily . Started Duloxetine 30 mg PO BID and  PRN Hydroxyzine 25 mg PO q8h for anxiety

## 2023-02-10 NOTE — ASSESSMENT & PLAN NOTE
42 y.o F w/ hx of IV drug use (meth), chronic HCV with cirrhosis, spinal fusion (04/2021), epidural abscess with removal of hardware (02/2022), spinal fusion with hardware (T10 - Pelvis / 03/2022), and neurogenic bladder here with complicated ESBL E.coli epidural abscess s/p washout,corpectomy, fixation being treated w/ meropenem.     Nephrology consulted for hyponatremia. Patient reports uncomfortable abdominal distension and leg swelling. Reports drinking 3-4 cups of her 30oz water tumbler daily. Urine Na <10, urine osm 513. Serum osm 283. Serum Na trend 136--> 132-->131-->129-->127-->135-->134-->127. Normal mentation. Cr/BUN wnl. Albumin 1.8, protein 4.5, bili 1.4.     Hyponatremia likely 2/2 to cirrhosis and hypervolemia. Possible poor solute intake relative to free water intake. Unlikely SIADH given low urine Na.     - fluid restrict to 1 L daily  - Lasix IV 60 daily, continue spironolactone 25mg daily  - Asymptomatic, no role for hypertonic saline  - Goal increase sodium 3-6 mM/24h  - q12h Na check

## 2023-02-10 NOTE — PT/OT/SLP PROGRESS
Occupational Therapy      Patient Name:  Rachel Zazueta   MRN:  [id number]     OT attempted  10:15. Patient not seen today secondary to Pt working with Physical Therapy and tech upon OT attempt. OT unable to make second attempt.  Will follow-up as appropriate.    2/10/2023

## 2023-02-10 NOTE — ASSESSMENT & PLAN NOTE
2/10  sono abdomen -Cirrhotic liver morphology.  Sequela of portal hypertension including moderate ascites, recanalized umbilical vein.  No focal hepatic lesions. Cholecystectomy. s/p IR paracentesis as above  2/11no evidence of SBP on paracentesis. 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 AFB and Triglyerides on ascitic fluid.    2/12  hepatology eval -If ascitic fluid triglyceride level is elevated, needs IR  eval for lymphangiogram and surgery eval for  lymphatic repair

## 2023-02-10 NOTE — ANESTHESIA POST-OP PAIN MANAGEMENT
Consult  Anesthesiology Pain Service      SUBJECTIVE:     Chief Complaint/Reason for Consult: Refractory pain in chronic pain patient    Surgical Procedure:    LAMINECTOMY, SPINE, THORACIC, WITH FUSION (T4-Pelvis fusion w/ T9-10 corpectomies) **AIRO (Spine Thoracic)       Post Op Day: 8    History of Present Illness:  42 y.o F w/ hx ofIV drug use (methamphetamine), chronic HCV with cirrhosis, spinal fusion (04/2021), epidural abscess with removal of hardware (02/2022), spinal fusion with hardware (T10 - Pelvis / 03/2022), obesity (BMI 39.3) and neurogenic bladder and recent shoulder surgery who initially presented to [facility] for evaluation of back pain and left leg weakness.    Patient transferred to [facility] for evaluation. Underwent back surgery on 1/24/23. Underwent another surgery 2/02/23. Was on a pca for pain control. Primary team dced pca 2/08. On 2/09 acute pain consulted for help managing pain      Home Pain Medications:   Suboxone 8.2mg BID  Gabapentin 300 mg tid        In-patient Scheduled Medications   acetaminophen  1,000 mg Oral Q6H    bisacodyL  10 mg Rectal Every other day    buPROPion  300 mg Oral Daily    dronabinoL  2.5 mg Oral BID    gabapentin  900 mg Oral TID    heparin (porcine)  5,000 Units Subcutaneous Q8H    lactulose  20 g Oral TID    meropenem (MERREM) IVPB  2 g Intravenous Q8H    methocarbamoL  1,000 mg Oral QID    pantoprazole  40 mg Oral Daily    sodium chloride 0.9%  10 mL Intravenous Q6H    spironolactone  25 mg Oral Daily       In-Patient PRN Medications  sodium chloride, sodium chloride, dextrose 10%, dextrose 10%, diazePAM, glucagon (human recombinant), glucose, glucose, hydrALAZINE, HYDROmorphone, magnesium hydroxide 400 mg/5 ml, melatonin, naloxone, ondansetron, oxyCODONE **AND** oxyCODONE, polyethylene glycol, prochlorperazine, senna-docusate 8.6-50 mg, sodium chloride 0.9%, Flushing PICC Protocol **AND** sodium chloride 0.9% **AND** sodium chloride  0.9%      Review of patient's allergies indicates:   Allergen Reactions    Bee venom protein (honey bee) Anaphylaxis     Patient reports she is allergic to bee stings.    Naproxen Anaphylaxis     Throat closing    12-23- Patient reports taking Ibuprofen 200 mg at home without problems. Verified X3. [place]    Wasp sting [allergen ext-venom-honey bee] Anaphylaxis    Adhesive Blisters    Iodine and iodide containing products Hives     Allergic to iodine in seafood only    Shellfish containing products     Nuts [tree nut] Rash        Past Medical History:   Diagnosis Date    Anemia     Anxiety     Depressed     Diskitis     Hep C w/o coma, chronic     IV drug abuse     Kidney stone     Liver cirrhosis      Past Surgical History:   Procedure Laterality Date    ARTHROTOMY OF SHOULDER Left 11/15/2022    Procedure: ARTHROTOMY, SHOULDER;  Surgeon: Bjorn Hayes MD;  Location: [facility];  Service: Orthopedics;  Laterality: Left;  Left acromioclavicular joint arthrotomy    BACK SURGERY      benine tumor removal      forehead, 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9    CHOLECYSTECTOMY      KIDNEY SURGERY      LUMBAR FUSION Bilateral 3/2/2022    Procedure: FUSION, SPINE, LUMBAR;  Surgeon: Guille Templeton MD;  Location: [address];  Service: Neurosurgery;  Laterality: Bilateral;  AIRO  T10--Pelvis    LUMBAR FUSION N/A 1/24/2023    Procedure: **AIRO** T8-T12 POSTERIOR FUSION, T8-T10 LAMINECTOMY, HARDWARE REMOVAL AND WASHOUT;  Surgeon: Guille Templeton MD;  Location: [address];  Service: Neurosurgery;  Laterality: N/A;    REMOVAL OF HARDWARE FROM SPINE N/A 2/21/2022    Procedure: REMOVAL, HARDWARE, SPINE;  Surgeon: Guille Templeton MD;  Location: [address];  Service: Neurosurgery;  Laterality: N/A;  [place]    SPINE SURGERY      THORACIC LAMINECTOMY WITH FUSION N/A 2/2/2023    Procedure: LAMINECTOMY, SPINE, THORACIC, WITH FUSION (T4-Pelvis fusion w/ T9-10 corpectomies) **AIRO;  Surgeon: Guille Templeton MD;  Location: [facility] OR [address] [place] [address];   Service: Neurosurgery;  Laterality: N/A;  AIRO, 2 bovies, aquamantys, Globus, Depuy, antibiotic beads, TXA, Peroxide; Babycos plastics closure     Family History   Problem Relation Age of Onset    Hepatitis Mother     Drug abuse Mother     Liver cancer Mother     Drug abuse Father     Cirrhosis Father     Hepatitis Father      Social History     Tobacco Use    Smoking status: Some Days     Packs/day: 0.50     Years: 23.00     Pack years: 11.50     Types: Cigarettes    Smokeless tobacco: Never    Tobacco comments:     patient states she knows she nees to quit.   Substance Use Topics    Alcohol use: Not Currently     Comment: quit 2014    Drug use: Yes     Frequency: 4.0 times per week     Types: Marijuana     Comment: Patient denies needle use, only inhalation of methampehtamines or orally.  Last known drug use was prior to admission to hospital stay for surgery           OBJECTIVE:     Current Vital Signs  Temp: 36.9 °C (98.4 °F) (02/10/23 0821)  Pulse: (!) 113 (02/10/23 0821)  Resp: 18 (02/10/23 0821)  BP: 131/60 (02/10/23 0821)  SpO2: 98 % (02/10/23 0821)    Vital Signs Range (Last 24H):  Temp:  [36.6 °C (97.9 °F)-37.1 °C (98.7 °F)]   Pulse:  [109-115]   Resp:  [16-20]   BP: ()/(52-63)   SpO2:  [95 %-100 %]       Physical Exam:  At Rest:   6    Numbers    Laboratory:  CBC:   Recent Labs     02/09/23  0356 02/10/23  0236   WBC 4.99 5.53   RBC 2.77* 2.57*   HGB 8.1* 7.4*   HCT 26.2* 24.4*   * 108*   MCV 95 95   MCH 29.2 28.8   MCHC 30.9* 30.3*       CMP:   Recent Labs     02/09/23  0356 02/09/23  1700 02/10/23  0236 02/10/23  0631   *   < > 134*  135* 127*   K 4.2   < > 3.5  3.6 4.0   CO2 24   < > 23  22* 22*   CL 99   < > 101  101 98   BUN 21*   < > 23*  23* 23*   CREATININE 0.6   < > 0.5  0.6 0.6   *   < > 136*  136* 135*   MG 1.9  --  1.8  --    PHOS 2.8   < > 1.7*  1.6* 3.0   CALCIUM 7.6*   < > 6.9*  6.9* 7.6*   ALBUMIN 1.9*   < > 1.6*  1.6* 1.8*   PROT 5.1*  --   4.5*  --    ALKPHOS 149*  --  142*  --    ALT 31  --  26  --    AST 42*  --  36  --    BILITOT 1.7*  --  1.4*  --     < > = values in this interval not displayed.           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     Assessement  42 year old chronic pain patient with history of IVDU who was previously on suboxone. Patient has undergone two back surgeries during this hospital stay. PCA was controlling pain but there was an increase after it was discontinued. We made adjustments to her multimodals. We also changed her opioids around. Patients pain is much better controlled 2/10. She is comfortable and understands there will be some pain. Since he is post op day 8 this is most likely chronic pain at this point. She will need to be setup with suboxone upon discharge.         Plan:  1. Tylenol 1000 mg q6hr  2. Dronabinol 2.5 mg bid  3. Gabapentin 900 mg tid  4. Robaxin 1000 mg q6hr  5. Oxy 5 and 10 mg q3hr  6. Dilaudid .5 mg q4hr for breakthrough    Patients pain is better controlled. Recommend setting up a plan to make sure she can suboxone upon discharge. Acute pain will sign off.

## 2023-02-10 NOTE — SUBJECTIVE & OBJECTIVE
Past Medical History:   Diagnosis Date    Anemia     Anxiety     Depressed     Diskitis     Hep C w/o coma, chronic     IV drug abuse     Kidney stone     Liver cirrhosis        Past Surgical History:   Procedure Laterality Date    ARTHROTOMY OF SHOULDER Left 11/15/2022    Procedure: ARTHROTOMY, SHOULDER;  Surgeon: Bjorn Hayes MD;  Location: [facility];  Service: Orthopedics;  Laterality: Left;  Left acromioclavicular joint arthrotomy    BACK SURGERY      benine tumor removal      forehead, age 9    CHOLECYSTECTOMY      KIDNEY SURGERY      LUMBAR FUSION Bilateral 3/2/2022    Procedure: FUSION, SPINE, LUMBAR;  Surgeon: Guille Templeton MD;  Location: [address];  Service: Neurosurgery;  Laterality: Bilateral;  AIRO  T10--Pelvis    LUMBAR FUSION N/A 1/24/2023    Procedure: **AIRO** T8-T12 POSTERIOR FUSION, T8-T10 LAMINECTOMY, HARDWARE REMOVAL AND WASHOUT;  Surgeon: Guille Templeton MD;  Location: [address];  Service: Neurosurgery;  Laterality: N/A;    REMOVAL OF HARDWARE FROM SPINE N/A 2/21/2022    Procedure: REMOVAL, HARDWARE, SPINE;  Surgeon: Guille Templeton MD;  Location: [address];  Service: Neurosurgery;  Laterality: N/A;  [place]    SPINE SURGERY      THORACIC LAMINECTOMY WITH FUSION N/A 2/2/2023    Procedure: LAMINECTOMY, SPINE, THORACIC, WITH FUSION (T4-Pelvis fusion w/ T9-10 corpectomies) **AIRO;  Surgeon: Guille Templeton MD;  Location: [address];  Service: Neurosurgery;  Laterality: N/A;  AIRO, 2 bovies, aquamantys, Globus, Depuy, antibiotic beads, TXA, Peroxide; Babycos plastics closure       Review of patient's allergies indicates:   Allergen Reactions    Bee venom protein (honey bee) Anaphylaxis     Patient reports she is allergic to bee stings.    Naproxen Anaphylaxis     Throat closing    12-23- Patient reports taking Ibuprofen 200 mg at home without problems. Verified X3. [place]    Wasp sting [allergen ext-venom-honey bee] Anaphylaxis    Adhesive Blisters    Iodine and iodide containing  products Hives     Allergic to iodine in seafood only    Shellfish containing products     Nuts [tree nut] Rash     Current Facility-Administered Medications   Medication Frequency    0.9%  NaCl infusion (for blood administration) Q24H PRN    0.9%  NaCl infusion (for blood administration) Q24H PRN    acetaminophen tablet 1,000 mg Q12H    bisacodyL suppository 10 mg Every other day    buPROPion TB24 tablet 300 mg Daily    dextrose 10% bolus 125 mL 125 mL PRN    dextrose 10% bolus 250 mL 250 mL PRN    dronabinoL capsule 2.5 mg BID    furosemide injection 60 mg Daily    gabapentin capsule 900 mg TID    glucagon (human recombinant) injection 1 mg PRN    glucose chewable tablet 16 g PRN    glucose chewable tablet 24 g PRN    heparin (porcine) injection 5,000 Units Q8H    hydrALAZINE tablet 25 mg Q8H PRN    HYDROmorphone injection 0.5 mg Q4H PRN    lactulose 20 gram/30 mL solution Soln 20 g TID    magnesium hydroxide 400 mg/5 ml suspension 2,400 mg Daily PRN    melatonin tablet 6 mg Nightly PRN    meropenem (MERREM) 2 g in sodium chloride 0.9% 100 mL IVPB Q8H    methocarbamoL tablet 1,000 mg Q6H    naloxone 0.4 mg/mL injection 0.02 mg PRN    ondansetron injection 4 mg Q6H PRN    oxyCODONE immediate release tablet 5 mg Q3H PRN    And    oxyCODONE immediate release tablet Tab 10 mg Q3H PRN    pantoprazole EC tablet 40 mg Daily    polyethylene glycol packet 17 g Daily PRN    prochlorperazine injection Soln 2.5 mg Q6H PRN    senna-docusate 8.6-50 mg per tablet 1 tablet BID PRN    simethicone chewable tablet 80 mg TID PRN    sodium chloride 0.9% flush 10 mL Q12H PRN    sodium chloride 0.9% flush 10 mL Q6H    And    sodium chloride 0.9% flush 10 mL PRN    spironolactone tablet 25 mg Daily     Family History       Problem Relation (Age of Onset)    Cirrhosis Father    Drug abuse Mother, Father    Hepatitis Mother, Father    Liver cancer Mother          Tobacco Use    Smoking status: Some Days     Packs/day: 0.50     Years: 23.00      Pack years: 11.50     Types: Cigarettes    Smokeless tobacco: Never    Tobacco comments:     patient states she knows she nees to quit.   Substance and Sexual Activity    Alcohol use: Not Currently     Comment: quit 2014    Drug use: Yes     Frequency: 4.0 times per week     Types: Marijuana     Comment: Patient denies needle use, only inhalation of methampehtamines or orally.  Last known drug use was prior to admission to hospital stay for surgery    Sexual activity: Yes     Partners: Male     Birth control/protection: None     Review of Systems   Cardiovascular:  Positive for leg swelling.   Gastrointestinal:  Positive for abdominal distention and abdominal pain.   Genitourinary:  Negative for decreased urine volume and hematuria.   Musculoskeletal:  Positive for back pain.   Psychiatric/Behavioral:  The patient is nervous/anxious.    Objective:     Vital Signs (Most Recent):  Temp: 97.5 °F (36.4 °C) (02/10/23 1118)  Pulse: 110 (02/10/23 1510)  Resp: 18 (02/10/23 1510)  BP: (!) 106/52 (02/10/23 1510)  SpO2: (!) 93 % (02/10/23 1510)   Vital Signs (24h Range):  Temp:  [97.5 °F (36.4 °C)-98.5 °F (36.9 °C)] 97.5 °F (36.4 °C)  Pulse:  [104-115] 110  Resp:  [16-20] 18  SpO2:  [93 %-100 %] 93 %  BP: (100-131)/(52-60) 106/52     Weight: (!) 140.5 kg (309 lb 11.9 oz) (02/10/23 0600)  Body mass index is 48.51 kg/m².  Body surface area is 2.58 meters squared.    I/O last 3 completed shifts:  In: 560 [P.O.:560]  Out: 2180 [Urine:1130; Drains:1050]    Physical Exam  Constitutional:       General: She is not in acute distress.     Appearance: Normal appearance. She is obese. She is not ill-appearing.   HENT:      Head: Normocephalic and atraumatic.   Eyes:      General: No scleral icterus.  Cardiovascular:      Rate and Rhythm: Normal rate.      Pulses: Normal pulses.   Pulmonary:      Effort: Pulmonary effort is normal.   Abdominal:      General: There is distension.      Tenderness: There is abdominal tenderness. There is  no guarding.   Musculoskeletal:      Right lower leg: Edema present.      Left lower leg: Edema present.   Skin:     General: Skin is warm and dry.      Coloration: Skin is not jaundiced.   Neurological:      Mental Status: She is alert and oriented to person, place, and time.   Psychiatric:         Behavior: Behavior normal.       Significant Labs:  CBC:   Recent Labs   Lab 02/10/23  0236   WBC 5.53   RBC 2.57*   HGB 7.4*   HCT 24.4*   *   MCV 95   MCH 28.8   MCHC 30.3*     CMP:   Recent Labs   Lab 02/10/23  0236 02/10/23  0631   *  136* 135*   CALCIUM 6.9*  6.9* 7.6*   ALBUMIN 1.6*  1.6* 1.8*   PROT 4.5*  --    *  135* 127*   K 3.5  3.6 4.0   CO2 23  22* 22*     101 98   BUN 23*  23* 23*   CREATININE 0.5  0.6 0.6   ALKPHOS 142*  --    ALT 26  --    AST 36  --    BILITOT 1.4*  --      All labs within the past 24 hours have been reviewed.    Significant Imaging:  Labs: Reviewed

## 2023-02-10 NOTE — ASSESSMENT & PLAN NOTE
2/10  abdominal X ray -  Nonobstructive bowel gas pattern.  No free air.  As before, mild gaseous distension of stomach. ammonia at 57 . sono abdomen with ascitis . simethicone PRN  2/11no evidence of SBP on paracentesis. 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 AFB and Triglyerides on ascitic fluid.

## 2023-02-10 NOTE — PROCEDURES
Radiology Post-Procedure Note    Pre Op Diagnosis: Ascites  Post Op Diagnosis: Same    Procedure: Ultrasound Guided Paracentesis    Procedure performed by: Samantha Lucero PA-C    Written Informed Consent Obtained: Yes  Specimen Removed: YES white, cloudy   Estimated Blood Loss: Minimal    Findings:   Successful paracentesis.  Albumin administered PRN per protocol.    Patient tolerated procedure well.    Samantha Lucero PA-C  Interventional Radiology  [phone]

## 2023-02-10 NOTE — ASSESSMENT & PLAN NOTE
Hb 6.4 on 2/6, received 1U pRBCs. Hb 2/7 WNL.  - monitor Hb  - transfuse to maintain Hb >7  2/9    Patient's with Normocytic anemia.. Hemoglobin stable. Etiology likely due to chronic disease .  Current CBC reviewed-    Recent Labs   Lab 02/14/23  0340 02/14/23  1508 02/15/23  0421   HGB 6.9* 7.9* 8.2*    Monitor CBC and transfuse if H/H drops below 7/21.   2/14 Hb 6.9 Transfusion with 1 unit of PRBC . FOBT negative

## 2023-02-10 NOTE — H&P
Inpatient Radiology Pre-procedure Note    History of Present Illness:  Rachel Zazueta is a 42 y.o. female who presents for US guided paracentesis.    Admission H&P reviewed.  Past Medical History:   Diagnosis Date    Anemia     Anxiety     Depressed     Diskitis     Hep C w/o coma, chronic     IV drug abuse     Kidney stone     Liver cirrhosis      Past Surgical History:   Procedure Laterality Date    ARTHROTOMY OF SHOULDER Left 11/15/2022    Procedure: ARTHROTOMY, SHOULDER;  Surgeon: Bjorn Hayes MD;  Location: [facility];  Service: Orthopedics;  Laterality: Left;  Left acromioclavicular joint arthrotomy    BACK SURGERY      benine tumor removal      forehead, age 9    CHOLECYSTECTOMY      KIDNEY SURGERY      LUMBAR FUSION Bilateral 3/2/2022    Procedure: FUSION, SPINE, LUMBAR;  Surgeon: Guille Templeton MD;  Location: [facility] OR [address] [place] [address];  Service: Neurosurgery;  Laterality: Bilateral;  AIRO  T10--Pelvis    LUMBAR FUSION N/A 1/24/2023    Procedure: **AIRO** T8-T12 POSTERIOR FUSION, T8-T10 LAMINECTOMY, HARDWARE REMOVAL AND WASHOUT;  Surgeon: Guille Templeton MD;  Location: [facility] OR [address] [place] [address];  Service: Neurosurgery;  Laterality: N/A;    REMOVAL OF HARDWARE FROM SPINE N/A 2/21/2022    Procedure: REMOVAL, HARDWARE, SPINE;  Surgeon: Guille Templeton MD;  Location: [facility] OR [address] [place] [address];  Service: Neurosurgery;  Laterality: N/A;  [place]    SPINE SURGERY      THORACIC LAMINECTOMY WITH FUSION N/A 2/2/2023    Procedure: LAMINECTOMY, SPINE, THORACIC, WITH FUSION (T4-Pelvis fusion w/ T9-10 corpectomies) **AIRO;  Surgeon: Guille Templeton MD;  Location: [facility] OR [address] [place] [address];  Service: Neurosurgery;  Laterality: N/A;  AIRO, 2 bovies, aquamantys, Globus, Depuy, antibiotic beads, TXA, Peroxide; [employer] plastics closure       Review of Systems:   As documented in primary team H&P    Home Meds:   Prior to Admission medications    Medication Sig Start Date End Date Taking? Authorizing Provider   albuterol (ACCUNEB) 1.25 mg/3 mL Nebu Take 3 mLs (1.25 mg  total) by nebulization every 6 (six) hours as needed (shortness of breath). Rescue 12/10/22 12/10/23  Farzana Elizabeth NP   buPROPion (WELLBUTRIN) 100 MG tablet Take 1 tablet (100 mg total) by mouth 2 (two) times daily. 11/22/22 11/22/23  HENRI Marcus   folic acid (FOLVITE) 1 MG tablet Take 1 tablet (1 mg total) by mouth once daily.  Patient not taking: Reported on 11/25/2022 11/22/22 1/3/23  Minnie Cortez MD   gabapentin (NEURONTIN) 300 MG capsule Take 1 capsule (300 mg total) by mouth 3 (three) times daily. 1/4/23 1/4/24  Dannielle Merino DO   lactulose (CHRONULAC) 20 gram/30 mL Soln Take 30 mLs (20 g total) by mouth 3 (three) times daily. 1/4/23 4/4/23  Dannielle Merino DO   pantoprazole (PROTONIX) 40 MG tablet Take 1 tablet (40 mg total) by mouth once daily. 3/23/22 11/25/22  Sai Red III, MD   diclofenac sodium (VOLTAREN) 1 % Gel Apply 2 g topically 4 (four) times daily. 12/31/21 3/11/22  Femi Galicia NP     Scheduled Meds:    acetaminophen  1,000 mg Oral Q12H    bisacodyL  10 mg Rectal Every other day    buPROPion  300 mg Oral Daily    dronabinoL  2.5 mg Oral BID    furosemide (LASIX) injection  60 mg Intravenous Daily    gabapentin  900 mg Oral TID    heparin (porcine)  5,000 Units Subcutaneous Q8H    lactulose  20 g Oral TID    meropenem (MERREM) IVPB  2 g Intravenous Q8H    methocarbamoL  1,000 mg Oral Q6H    pantoprazole  40 mg Oral Daily    sodium chloride 0.9%  10 mL Intravenous Q6H    spironolactone  25 mg Oral Daily     Continuous Infusions:   PRN Meds:sodium chloride, sodium chloride, dextrose 10%, dextrose 10%, glucagon (human recombinant), glucose, glucose, hydrALAZINE, HYDROmorphone, magnesium hydroxide 400 mg/5 ml, melatonin, naloxone, ondansetron, oxyCODONE **AND** oxyCODONE, polyethylene glycol, prochlorperazine, senna-docusate 8.6-50 mg, simethicone, sodium chloride 0.9%, Flushing PICC Protocol **AND** sodium chloride 0.9% **AND** sodium chloride  0.9%  Anticoagulants/Antiplatelets: Heparin    Allergies:   Review of patient's allergies indicates:   Allergen Reactions    Bee venom protein (honey bee) Anaphylaxis     Patient reports she is allergic to bee stings.    Naproxen Anaphylaxis     Throat closing    12-23- Patient reports taking Ibuprofen 200 mg at home without problems. Verified X3. [place]    Wasp sting [allergen ext-venom-honey bee] Anaphylaxis    Adhesive Blisters    Iodine and iodide containing products Hives     Allergic to iodine in seafood only    Shellfish containing products     Nuts [tree nut] Rash     Sedation Hx: have not been any systemic reactions    Vitals:  Temp: 97.5 °F (36.4 °C) (02/10/23 1118)  Pulse: 104 (02/10/23 1118)  Resp: 19 (02/10/23 1118)  BP: (!) 120/57 (02/10/23 1118)  SpO2: 98 % (02/10/23 1118)     Physical Exam:  ASA: 3  Mallampati: n/a    General: no acute distress  Mental Status: alert and oriented to person, place and time  HEENT: normocephalic, atraumatic  Chest: unlabored breathing  Heart: regular heart rate  Abdomen: distended  Extremity: moves all extremities    UPT collected and negative    Plan: US guided paracentesis  Sedation Plan: local     Samantha Lucero PA-C  Interventional Radiology  [phone]

## 2023-02-10 NOTE — PROGRESS NOTES
[facility] - Telemetry Stepdown  Neurosurgery  Progress Note    Subjective:     History of Present Illness: Ms. Zazueta is a 42 y.o F w/ hx ofIV drug use (methamphetamine), chronic HCV with cirrhosis, spinal fusion (04/2021), epidural abscess with removal of hardware (02/2022), spinal fusion with hardware (T10 - Pelvis / 03/2022), obesity (BMI 39.3) and neurogenic bladder and recent shoulder surgery who initially presented to [facility] for evaluation of back pain and left leg weakness.  She reports initially noting the left leg weakness when she was about to go to the bathroom and was about to fall but was assisted by her boyfriend.  She was brought to the ED via EMS.  Urinalysis with UTI concerns and blood cultures at OSH grew ESBL E coli, and shoulder effusion concern for infection so she was treated with meropenem.  During hospitalization, she reports the weakness that started out in her left leg initially then spread upwards to her left thigh, left hip, then crossed over to the right hip and spread to her right leg.  Denies recent IV drug use. She reports her last incidence of drug use was more than 3 years ago and also denies tobacco, and alcohol abuse.  Reports cannabis use.     OSH course notable for concerning urinalysis and blood cultures positive for ESBL E coli treated with meropenem.  She was also admit to the ICU where she was treated with Precedex for significant body aches, irritability, and muscle spasms.  Concerns of a murmur on physical exam so she underwent TTE which did not show any vegetations.  Worsening lower extremity weakness workup with MRI head nonspecific, MRI cervical spine with multilevel spondylosis, X-ray spine with multilevel thoracolumbar fusion and diskectomy, focal kyphosis at T9-10 increased compared to prior.  She was started on prophylaxis amoxicillin due to her history of infected hardware.  MRI spine unable to be completed due to patient's body habitus so she was  transferred to [facility] for further imaging and Neurosurgery, Neurology evaluation.      Post-Op Info:  Procedure(s) (LRB):  LAMINECTOMY, SPINE, THORACIC, WITH FUSION (T4-Pelvis fusion w/ T9-10 corpectomies) **AIRO (N/A)   8 Days Post-Op     Interval History: NAEON. Pain controlled significantly better today. Reports doing well with therapy today. Patient sat at EOB. Mobility and strength significantly limited due to worsening bilateral leg edema possible due to third spacing. MIHIR drains with continued high output, will keep.    Medications:  Continuous Infusions:  Scheduled Meds:   acetaminophen  1,000 mg Oral Q12H    bisacodyL  10 mg Rectal Every other day    buPROPion  300 mg Oral Daily    dronabinoL  2.5 mg Oral BID    gabapentin  900 mg Oral TID    heparin (porcine)  5,000 Units Subcutaneous Q8H    lactulose  20 g Oral TID    meropenem (MERREM) IVPB  2 g Intravenous Q8H    methocarbamoL  1,000 mg Oral Q6H    pantoprazole  40 mg Oral Daily    sodium chloride 0.9%  10 mL Intravenous Q6H    spironolactone  25 mg Oral Daily     PRN Meds:sodium chloride, sodium chloride, dextrose 10%, dextrose 10%, diazePAM, glucagon (human recombinant), glucose, glucose, hydrALAZINE, HYDROmorphone, magnesium hydroxide 400 mg/5 ml, melatonin, naloxone, ondansetron, oxyCODONE **AND** oxyCODONE, polyethylene glycol, prochlorperazine, senna-docusate 8.6-50 mg, simethicone, sodium chloride 0.9%, Flushing PICC Protocol **AND** sodium chloride 0.9% **AND** sodium chloride 0.9%     Review of Systems  Objective:     Weight: (!) 140.5 kg (309 lb 11.9 oz)  Body mass index is 48.51 kg/m².  Vital Signs (Most Recent):  Temp: 98.4 °F (36.9 °C) (02/10/23 0821)  Pulse: (!) 111 (02/10/23 1052)  Resp: 18 (02/10/23 0831)  BP: 131/60 (02/10/23 0821)  SpO2: 98 % (02/10/23 0821)   Vital Signs (24h Range):  Temp:  [97.9 °F (36.6 °C)-98.5 °F (36.9 °C)] 98.4 °F (36.9 °C)  Pulse:  [109-115] 111  Resp:  [16-20] 18  SpO2:  [95 %-100 %] 98 %  BP:  ()/(52-60) 131/60     Date 02/10/23 0700 - 02/11/23 0659   Shift 4172-6972 3004-4528 0089-7746 24 Hour Total   INTAKE   Shift Total(mL/kg)       OUTPUT   Drains 20   20   Shift Total(mL/kg) 20(0.1)   20(0.1)   Weight (kg) 140.5 140.5 140.5 140.5                        Closed/Suction Drain 02/02/23 1401 Right Back Bulb 15 Fr. (Active)   Site Description Unable to view 02/10/23 0752   Dressing Type Transparent (Tegaderm) 02/10/23 0752   Dressing Status Clean;Dry;Intact 02/10/23 0752   Dressing Intervention Integrity maintained 02/10/23 0752   Drainage Serosanguineous 02/10/23 0752   Status Open to gravity drainage 02/10/23 0752   Output (mL) 10 mL 02/10/23 0545            Closed/Suction Drain 02/02/23 1402 Right Back Bulb 15 Fr. (Active)   Site Description Unable to view 02/10/23 0752   Dressing Type Transparent (Tegaderm) 02/10/23 0752   Dressing Status Dry;Clean;Intact 02/10/23 0752   Dressing Intervention Integrity maintained 02/10/23 0752   Drainage Serosanguineous 02/10/23 0752   Status To bulb suction 02/10/23 0752   Output (mL) 120 mL 02/10/23 0545            Closed/Suction Drain 02/02/23 1402 Left Back Bulb 15 Fr. (Active)   Site Description Unable to view 02/10/23 0752   Dressing Type Transparent (Tegaderm) 02/10/23 0752   Dressing Status Clean;Intact 02/10/23 0752   Dressing Intervention Integrity maintained 02/10/23 0752   Drainage Serosanguineous 02/10/23 0752   Status To bulb suction 02/10/23 0752   Output (mL) 20 mL 02/10/23 0834            Closed/Suction Drain 02/02/23 1402 Left Back Bulb 15 Fr. (Active)   Site Description Healing 02/10/23 0752   Dressing Type Transparent (Tegaderm) 02/10/23 0752   Dressing Status Intact;Dry;Clean 02/10/23 0752   Dressing Intervention Integrity maintained 02/10/23 0752   Drainage Serosanguineous 02/10/23 0752   Status Open to gravity drainage 02/10/23 0752   Output (mL) 10 mL 02/10/23 0545       Neurosurgery Physical Exam  General: well developed, well nourished,  no distress.   Head: normocephalic, atraumatic  Neurologic: Alert and oriented. Thought content appropriate.  GCS: Motor: 6/Verbal: 5/Eyes: 4 GCS Total: 15  Mental Status: Awake, Alert, Oriented x 4  Language: No aphasia  Speech: No dysarthria  Cranial nerves: face symmetric, tongue midline, CN II-XII grossly intact.   Eyes: pupils equal, round, reactive to light with accommodation, EOMI. Dysconjugate gaze.  Pulmonary: normal respirations, no signs of respiratory distress  Abdomen: soft, non-distended, not tender to palpation  Skin: Skin is warm, dry and intact.  Sensory: intact to light touch throughout  Motor Strength: Moves all extremities spontaneously.     Strength   Deltoids Triceps Biceps Wrist Extension Wrist Flexion Hand    Upper: R 5/5 5/5 5/5 5/5 5/5 5/5     L 5/5 5/5 5/5 5/5 5/5 5/5       Iliopsoas Quadriceps Knee  Flexion Tibialis  anterior Gastro- cnemius EHL   Lower: R 2/5 3/5 3/5 4+/5 4+/5 4+/5     L 2/5 3/5 3/5 4+/5 4+/5 4+/5      BLE strength/ROM limited due to pain, body habitus and severe bilateral leg edema.     Thoracolumbar incision: C/D/I with staples. Skin edges well approximated. No surrounding erythema or edema. No drainage or TTP.      MIHIR drains x 4 intact to gravity with ss output.     Significant Labs:  Recent Labs   Lab 02/09/23  0356 02/09/23  1700 02/10/23  0236 02/10/23  0631   * 106 136*  136* 135*   * 127* 134*  135* 127*   K 4.2 4.0 3.5  3.6 4.0   CL 99 98 101  101 98   CO2 24 23 23  22* 22*   BUN 21* 22* 23*  23* 23*   CREATININE 0.6 0.5 0.5  0.6 0.6   CALCIUM 7.6* 7.2* 6.9*  6.9* 7.6*   MG 1.9  --  1.8  --      Recent Labs   Lab 02/09/23  0356 02/10/23  0236   WBC 4.99 5.53   HGB 8.1* 7.4*   HCT 26.2* 24.4*   * 108*     No results for input(s): LABPT, INR, APTT in the last 48 hours.  Microbiology Results (last 7 days)       Procedure Component Value Units Date/Time    Fungus culture [[id number]] Collected: 01/24/23 0913    Order Status: Completed  Specimen: Body Fluid from Back Updated: 02/08/23 0655     Fungus (Mycology) Culture Culture in progress      No fungus isolated after 2 weeks    Narrative:      1) Perispinal    Fungus culture [[id number]] Collected: 01/24/23 0943    Order Status: Completed Specimen: Bone from Back Updated: 02/08/23 0655     Fungus (Mycology) Culture Culture in progress      No fungus isolated after 2 weeks    Narrative:      3) Perispinal    Fungus culture [[id number]] Collected: 01/24/23 0943    Order Status: Completed Specimen: Bone from Back Updated: 02/08/23 0655     Fungus (Mycology) Culture Culture in progress      No fungus isolated after 2 weeks    Narrative:      4) Perispinal    Fungus culture [[id number]] Collected: 01/24/23 0913    Order Status: Completed Specimen: Body Fluid from Back Updated: 02/08/23 0655     Fungus (Mycology) Culture Culture in progress      No fungus isolated after 2 weeks    Narrative:      2) Perispinal          All pertinent labs from the last 24 hours have been reviewed.    Significant Diagnostics:  I have reviewed and interpreted all pertinent imaging results/findings within the past 24 hours.    Assessment/Plan:     * Weakness of both lower extremities  Pt is 42F multiple spinal surgeries and revisions last T10- Pelvis in March 2022 who presented to OSH with concern for shoulder infection and UTI found to have E coli sepsis who has had progressive worsening BLE weakness. 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 Transferred to [facility] to  and neurosurgery was consulted.    OR 1/24 for temporary T6-12 PSIF. Taz pus noted intraoperatively.   OR 2/2 for T4-pelvis revision and extension of fusion with T9-10 corpectomy with plastic surgery assistance.  Maps goals > 85 completed 2/5 in ICU.    Plan:  --Stepped down to  floor.   -q4h neurochecks.  --All labs & diagnostics reviewed.   -post op xrays pending. Obtain upright when pt able to stand or sit, prior to  discharge.  --TLSO to be worn when OOB only.  --MIHIR drains x4, monitor output qshift. Keep to gravity. Monitor H/H. Transfuse for Hg <7.0 or for symptomatic anemia with Hg < 8.0.  --Plastic surgery managing incision. Open to air.   - Off-load pressure as much as possible to promote wound healing.   - Elevate with wedge, alternate sides Q2H  --Thoracic discitis: BCx 1/20 no growth. OR cultures 1/24 grew ESBL E. coli   -- ID consulted. Recs for IV meropenem with estimated end date of 3/29/23.  --Pain Control: Continue multimodal regimen. PCA pump discontinued 2/8 with poor pain control subsequently.    - Anesthesia Pain Service consulted due to refractory pain, appreciate assistance.   - No NSAIDs as pt s/p recent instrumented fusion.  --DVT PPx: SQH/TEDs/SCDs.  --NSGY will continue to follow. Please contact team with any further questions.        Afua Campbell PA-C  Neurosurgery  [facility] - Telemetry Stepdown

## 2023-02-11 LAB
ALBUMIN SERPL BCP-MCNC: 2 G/DL (ref 3.5–5.2)
ALBUMIN SERPL BCP-MCNC: 2 G/DL (ref 3.5–5.2)
ALBUMIN SERPL BCP-MCNC: 2.1 G/DL (ref 3.5–5.2)
ALP SERPL-CCNC: 141 U/L (ref 55–135)
ALT SERPL W/O P-5'-P-CCNC: 24 U/L (ref 10–44)
ANION GAP SERPL CALC-SCNC: 5 MMOL/L (ref 8–16)
ANION GAP SERPL CALC-SCNC: 6 MMOL/L (ref 8–16)
ANION GAP SERPL CALC-SCNC: 9 MMOL/L (ref 8–16)
AST SERPL-CCNC: 35 U/L (ref 10–40)
BASOPHILS # BLD AUTO: 0.05 K/UL (ref 0–0.2)
BASOPHILS NFR BLD: 1.2 % (ref 0–1.9)
BILIRUB SERPL-MCNC: 1.4 MG/DL (ref 0.1–1)
BUN SERPL-MCNC: 24 MG/DL (ref 6–20)
BUN SERPL-MCNC: 24 MG/DL (ref 6–20)
BUN SERPL-MCNC: 26 MG/DL (ref 6–20)
CALCIUM SERPL-MCNC: 7.4 MG/DL (ref 8.7–10.5)
CALCIUM SERPL-MCNC: 7.7 MG/DL (ref 8.7–10.5)
CALCIUM SERPL-MCNC: 7.8 MG/DL (ref 8.7–10.5)
CHLORIDE SERPL-SCNC: 97 MMOL/L (ref 95–110)
CO2 SERPL-SCNC: 22 MMOL/L (ref 23–29)
CO2 SERPL-SCNC: 23 MMOL/L (ref 23–29)
CO2 SERPL-SCNC: 26 MMOL/L (ref 23–29)
CREAT SERPL-MCNC: 0.5 MG/DL (ref 0.5–1.4)
CREAT SERPL-MCNC: 0.6 MG/DL (ref 0.5–1.4)
CREAT SERPL-MCNC: 0.6 MG/DL (ref 0.5–1.4)
DIFFERENTIAL METHOD: ABNORMAL
EOSINOPHIL # BLD AUTO: 0.2 K/UL (ref 0–0.5)
EOSINOPHIL NFR BLD: 4.6 % (ref 0–8)
ERYTHROCYTE [DISTWIDTH] IN BLOOD BY AUTOMATED COUNT: 20.1 % (ref 11.5–14.5)
EST. GFR  (NO RACE VARIABLE): >60 ML/MIN/1.73 M^2
GLUCOSE SERPL-MCNC: 108 MG/DL (ref 70–110)
GLUCOSE SERPL-MCNC: 109 MG/DL (ref 70–110)
GLUCOSE SERPL-MCNC: 112 MG/DL (ref 70–110)
GRAM STN SPEC: NORMAL
GRAM STN SPEC: NORMAL
HCT VFR BLD AUTO: 24.1 % (ref 37–48.5)
HGB BLD-MCNC: 7.4 G/DL (ref 12–16)
IMM GRANULOCYTES # BLD AUTO: 0.03 K/UL (ref 0–0.04)
IMM GRANULOCYTES NFR BLD AUTO: 0.7 % (ref 0–0.5)
LYMPHOCYTES # BLD AUTO: 0.8 K/UL (ref 1–4.8)
LYMPHOCYTES NFR BLD: 19.4 % (ref 18–48)
MAGNESIUM SERPL-MCNC: 2.1 MG/DL (ref 1.6–2.6)
MCH RBC QN AUTO: 28.7 PG (ref 27–31)
MCHC RBC AUTO-ENTMCNC: 30.7 G/DL (ref 32–36)
MCV RBC AUTO: 93 FL (ref 82–98)
MONOCYTES # BLD AUTO: 0.7 K/UL (ref 0.3–1)
MONOCYTES NFR BLD: 15 % (ref 4–15)
NEUTROPHILS # BLD AUTO: 2.6 K/UL (ref 1.8–7.7)
NEUTROPHILS NFR BLD: 59.1 % (ref 38–73)
NRBC BLD-RTO: 0 /100 WBC
PHOSPHATE SERPL-MCNC: 2.7 MG/DL (ref 2.7–4.5)
PHOSPHATE SERPL-MCNC: 2.8 MG/DL (ref 2.7–4.5)
PHOSPHATE SERPL-MCNC: 2.9 MG/DL (ref 2.7–4.5)
PLATELET # BLD AUTO: 102 K/UL (ref 150–450)
PMV BLD AUTO: 9.6 FL (ref 9.2–12.9)
POTASSIUM SERPL-SCNC: 3.7 MMOL/L (ref 3.5–5.1)
POTASSIUM SERPL-SCNC: 3.7 MMOL/L (ref 3.5–5.1)
POTASSIUM SERPL-SCNC: 3.8 MMOL/L (ref 3.5–5.1)
PROT SERPL-MCNC: 5 G/DL (ref 6–8.4)
RBC # BLD AUTO: 2.58 M/UL (ref 4–5.4)
SODIUM SERPL-SCNC: 126 MMOL/L (ref 136–145)
SODIUM SERPL-SCNC: 128 MMOL/L (ref 136–145)
SODIUM SERPL-SCNC: 128 MMOL/L (ref 136–145)
WBC # BLD AUTO: 4.34 K/UL (ref 3.9–12.7)

## 2023-02-11 PROCEDURE — 99233 PR SUBSEQUENT HOSPITAL CARE,LEVL III: ICD-10-PCS | Mod: ,,, | Performed by: INTERNAL MEDICINE

## 2023-02-11 PROCEDURE — 99233 PR SUBSEQUENT HOSPITAL CARE,LEVL III: ICD-10-PCS | Mod: ,,, | Performed by: HOSPITALIST

## 2023-02-11 PROCEDURE — 99024 PR POST-OP FOLLOW-UP VISIT: ICD-10-PCS | Mod: ,,, | Performed by: PHYSICIAN ASSISTANT

## 2023-02-11 PROCEDURE — 25000003 PHARM REV CODE 250: Performed by: PSYCHIATRY & NEUROLOGY

## 2023-02-11 PROCEDURE — 84100 ASSAY OF PHOSPHORUS: CPT | Performed by: STUDENT IN AN ORGANIZED HEALTH CARE EDUCATION/TRAINING PROGRAM

## 2023-02-11 PROCEDURE — 94761 N-INVAS EAR/PLS OXIMETRY MLT: CPT

## 2023-02-11 PROCEDURE — 36415 COLL VENOUS BLD VENIPUNCTURE: CPT | Performed by: HOSPITALIST

## 2023-02-11 PROCEDURE — A4216 STERILE WATER/SALINE, 10 ML: HCPCS | Performed by: PSYCHIATRY & NEUROLOGY

## 2023-02-11 PROCEDURE — 63600175 PHARM REV CODE 636 W HCPCS

## 2023-02-11 PROCEDURE — 85025 COMPLETE CBC W/AUTO DIFF WBC: CPT

## 2023-02-11 PROCEDURE — 63600175 PHARM REV CODE 636 W HCPCS: Performed by: NURSE PRACTITIONER

## 2023-02-11 PROCEDURE — 83735 ASSAY OF MAGNESIUM: CPT | Performed by: STUDENT IN AN ORGANIZED HEALTH CARE EDUCATION/TRAINING PROGRAM

## 2023-02-11 PROCEDURE — 80053 COMPREHEN METABOLIC PANEL: CPT | Performed by: STUDENT IN AN ORGANIZED HEALTH CARE EDUCATION/TRAINING PROGRAM

## 2023-02-11 PROCEDURE — 25000003 PHARM REV CODE 250: Performed by: HOSPITALIST

## 2023-02-11 PROCEDURE — 20600001 HC STEP DOWN PRIVATE ROOM

## 2023-02-11 PROCEDURE — 27000207 HC ISOLATION

## 2023-02-11 PROCEDURE — 99233 SBSQ HOSP IP/OBS HIGH 50: CPT | Mod: ,,, | Performed by: INTERNAL MEDICINE

## 2023-02-11 PROCEDURE — 63600175 PHARM REV CODE 636 W HCPCS: Performed by: STUDENT IN AN ORGANIZED HEALTH CARE EDUCATION/TRAINING PROGRAM

## 2023-02-11 PROCEDURE — 25000003 PHARM REV CODE 250

## 2023-02-11 PROCEDURE — 25000003 PHARM REV CODE 250: Performed by: INTERNAL MEDICINE

## 2023-02-11 PROCEDURE — 80069 RENAL FUNCTION PANEL: CPT | Mod: 91 | Performed by: HOSPITALIST

## 2023-02-11 PROCEDURE — 25000003 PHARM REV CODE 250: Performed by: STUDENT IN AN ORGANIZED HEALTH CARE EDUCATION/TRAINING PROGRAM

## 2023-02-11 PROCEDURE — 99233 SBSQ HOSP IP/OBS HIGH 50: CPT | Mod: ,,, | Performed by: HOSPITALIST

## 2023-02-11 PROCEDURE — 99024 POSTOP FOLLOW-UP VISIT: CPT | Mod: ,,, | Performed by: PHYSICIAN ASSISTANT

## 2023-02-11 PROCEDURE — 63600175 PHARM REV CODE 636 W HCPCS: Performed by: HOSPITALIST

## 2023-02-11 RX ORDER — FUROSEMIDE 10 MG/ML
80 INJECTION INTRAMUSCULAR; INTRAVENOUS
Status: DISCONTINUED | OUTPATIENT
Start: 2023-02-11 | End: 2023-02-15

## 2023-02-11 RX ADMIN — LACTULOSE 20 G: 20 SOLUTION ORAL at 08:02

## 2023-02-11 RX ADMIN — Medication 10 ML: at 12:02

## 2023-02-11 RX ADMIN — OXYCODONE HYDROCHLORIDE 10 MG: 10 TABLET ORAL at 11:02

## 2023-02-11 RX ADMIN — GABAPENTIN 900 MG: 300 CAPSULE ORAL at 08:02

## 2023-02-11 RX ADMIN — METHOCARBAMOL 1000 MG: 500 TABLET ORAL at 05:02

## 2023-02-11 RX ADMIN — Medication 10 ML: at 06:02

## 2023-02-11 RX ADMIN — OXYCODONE HYDROCHLORIDE 10 MG: 10 TABLET ORAL at 10:02

## 2023-02-11 RX ADMIN — HEPARIN SODIUM 5000 UNITS: 5000 INJECTION INTRAVENOUS; SUBCUTANEOUS at 01:02

## 2023-02-11 RX ADMIN — FUROSEMIDE 80 MG: 10 INJECTION, SOLUTION INTRAMUSCULAR; INTRAVENOUS at 08:02

## 2023-02-11 RX ADMIN — OXYCODONE HYDROCHLORIDE 10 MG: 10 TABLET ORAL at 05:02

## 2023-02-11 RX ADMIN — SPIRONOLACTONE 25 MG: 25 TABLET, FILM COATED ORAL at 08:02

## 2023-02-11 RX ADMIN — MEROPENEM 2 G: 1 INJECTION INTRAVENOUS at 02:02

## 2023-02-11 RX ADMIN — DRONABINOL 2.5 MG: 2.5 CAPSULE ORAL at 08:02

## 2023-02-11 RX ADMIN — METHOCARBAMOL 1000 MG: 500 TABLET ORAL at 01:02

## 2023-02-11 RX ADMIN — ACETAMINOPHEN 1000 MG: 500 TABLET ORAL at 08:02

## 2023-02-11 RX ADMIN — BUPROPION HYDROCHLORIDE 300 MG: 300 TABLET, FILM COATED, EXTENDED RELEASE ORAL at 08:02

## 2023-02-11 RX ADMIN — PANTOPRAZOLE SODIUM 40 MG: 40 TABLET, DELAYED RELEASE ORAL at 08:02

## 2023-02-11 RX ADMIN — GABAPENTIN 900 MG: 300 CAPSULE ORAL at 03:02

## 2023-02-11 RX ADMIN — FUROSEMIDE 60 MG: 10 INJECTION, SOLUTION INTRAMUSCULAR; INTRAVENOUS at 08:02

## 2023-02-11 RX ADMIN — HEPARIN SODIUM 5000 UNITS: 5000 INJECTION INTRAVENOUS; SUBCUTANEOUS at 05:02

## 2023-02-11 RX ADMIN — HYDROMORPHONE HYDROCHLORIDE 0.5 MG: 1 INJECTION, SOLUTION INTRAMUSCULAR; INTRAVENOUS; SUBCUTANEOUS at 08:02

## 2023-02-11 RX ADMIN — MEROPENEM 2 G: 1 INJECTION INTRAVENOUS at 05:02

## 2023-02-11 RX ADMIN — MEROPENEM 2 G: 1 INJECTION INTRAVENOUS at 10:02

## 2023-02-11 RX ADMIN — HYDROMORPHONE HYDROCHLORIDE 0.5 MG: 1 INJECTION, SOLUTION INTRAMUSCULAR; INTRAVENOUS; SUBCUTANEOUS at 01:02

## 2023-02-11 RX ADMIN — METHOCARBAMOL 1000 MG: 500 TABLET ORAL at 11:02

## 2023-02-11 NOTE — SUBJECTIVE & OBJECTIVE
Interval History: Na 126 with repeat stable at 128. Encouraged maintaining 1 L fluid restriction. Minimal UOP on Lasix 60 qd. Edema on B/L LE and distended abdomen. Para yesterday with removal of 5L. Cr 0.6.     Review of patient's allergies indicates:   Allergen Reactions    Bee venom protein (honey bee) Anaphylaxis     Patient reports she is allergic to bee stings.    Naproxen Anaphylaxis     Throat closing    12-23- Patient reports taking Ibuprofen 200 mg at home without problems. Verified X3. [place]    Wasp sting [allergen ext-venom-honey bee] Anaphylaxis    Adhesive Blisters    Iodine and iodide containing products Hives     Allergic to iodine in seafood only    Shellfish containing products     Nuts [tree nut] Rash     Current Facility-Administered Medications   Medication Frequency    0.9%  NaCl infusion (for blood administration) Q24H PRN    0.9%  NaCl infusion (for blood administration) Q24H PRN    acetaminophen tablet 1,000 mg Q12H    bisacodyL suppository 10 mg Every other day    buPROPion TB24 tablet 300 mg Daily    dextrose 10% bolus 125 mL 125 mL PRN    dextrose 10% bolus 250 mL 250 mL PRN    dronabinoL capsule 2.5 mg BID    furosemide injection 60 mg Daily    gabapentin capsule 900 mg TID    glucagon (human recombinant) injection 1 mg PRN    glucose chewable tablet 16 g PRN    glucose chewable tablet 24 g PRN    heparin (porcine) injection 5,000 Units Q8H    hydrALAZINE tablet 25 mg Q8H PRN    HYDROmorphone injection 0.5 mg Q4H PRN    lactulose 20 gram/30 mL solution Soln 20 g TID    magnesium hydroxide 400 mg/5 ml suspension 2,400 mg Daily PRN    melatonin tablet 6 mg Nightly PRN    meropenem (MERREM) 2 g in sodium chloride 0.9% 100 mL IVPB Q8H    methocarbamoL tablet 1,000 mg Q6H    naloxone 0.4 mg/mL injection 0.02 mg PRN    ondansetron injection 4 mg Q6H PRN    oxyCODONE immediate release tablet 5 mg Q3H PRN    And    oxyCODONE immediate release tablet Tab 10 mg Q3H PRN    pantoprazole EC tablet 40 mg  Daily    polyethylene glycol packet 17 g Daily PRN    prochlorperazine injection Soln 2.5 mg Q6H PRN    senna-docusate 8.6-50 mg per tablet 1 tablet BID PRN    simethicone chewable tablet 80 mg TID PRN    sodium chloride 0.9% flush 10 mL Q12H PRN    sodium chloride 0.9% flush 10 mL Q6H    And    sodium chloride 0.9% flush 10 mL PRN    spironolactone tablet 25 mg Daily       Objective:     Vital Signs (Most Recent):  Temp: 97.9 °F (36.6 °C) (02/11/23 1129)  Pulse: 100 (02/11/23 1430)  Resp: 18 (02/11/23 1430)  BP: (!) 96/51 (02/11/23 1129)  SpO2: 96 % (02/11/23 1430)   Vital Signs (24h Range):  Temp:  [97.9 °F (36.6 °C)-98.3 °F (36.8 °C)] 97.9 °F (36.6 °C)  Pulse:  [100-113] 100  Resp:  [16-19] 18  SpO2:  [93 %-98 %] 96 %  BP: ()/(50-64) 96/51     Weight: (!) 140.5 kg (309 lb 11.9 oz) (02/10/23 0600)  Body mass index is 48.51 kg/m².  Body surface area is 2.58 meters squared.    I/O last 3 completed shifts:  In: 2323.2 [P.O.:1340; IV Piggyback:983.2]  Out: 6895 [Urine:1180; Drains:715; Other:5000]    Physical Exam  Constitutional:       General: She is not in acute distress.     Appearance: Normal appearance. She is obese. She is not ill-appearing.   HENT:      Head: Normocephalic and atraumatic.   Eyes:      General: No scleral icterus.  Cardiovascular:      Rate and Rhythm: Normal rate.      Pulses: Normal pulses.   Pulmonary:      Effort: Pulmonary effort is normal.   Abdominal:      General: There is distension.      Tenderness: There is abdominal tenderness. There is no guarding.   Musculoskeletal:      Right lower leg: Edema present.      Left lower leg: Edema present.   Skin:     General: Skin is warm and dry.      Coloration: Skin is not jaundiced.   Neurological:      Mental Status: She is alert and oriented to person, place, and time.   Psychiatric:         Behavior: Behavior normal.       Significant Labs:  CBC:   Recent Labs   Lab 02/11/23  0430   WBC 4.34   RBC 2.58*   HGB 7.4*   HCT 24.1*   PLT  102*   MCV 93   MCH 28.7   MCHC 30.7*     CMP:   Recent Labs   Lab 02/11/23  0430 02/11/23  1144    112*   CALCIUM 7.7* 7.4*   ALBUMIN 2.1* 2.0*   PROT 5.0*  --    * 128*   K 3.7 3.7   CO2 23 26   CL 97 97   BUN 24* 24*   CREATININE 0.6 0.5   ALKPHOS 141*  --    ALT 24  --    AST 35  --    BILITOT 1.4*  --      All labs within the past 24 hours have been reviewed.     Significant Imaging:  Labs: Reviewed

## 2023-02-11 NOTE — SUBJECTIVE & OBJECTIVE
Interval History: Exam stable. Per plastics- keep all drains until discharge     Medications:  Continuous Infusions:  Scheduled Meds:   acetaminophen  1,000 mg Oral Q12H    bisacodyL  10 mg Rectal Every other day    buPROPion  300 mg Oral Daily    dronabinoL  2.5 mg Oral BID    furosemide (LASIX) injection  80 mg Intravenous Q12H    gabapentin  900 mg Oral TID    heparin (porcine)  5,000 Units Subcutaneous Q8H    lactulose  20 g Oral TID    meropenem (MERREM) IVPB  2 g Intravenous Q8H    methocarbamoL  1,000 mg Oral Q6H    pantoprazole  40 mg Oral Daily    sodium chloride 0.9%  10 mL Intravenous Q6H    spironolactone  25 mg Oral Daily     PRN Meds:sodium chloride, sodium chloride, dextrose 10%, dextrose 10%, glucagon (human recombinant), glucose, glucose, hydrALAZINE, HYDROmorphone, magnesium hydroxide 400 mg/5 ml, melatonin, naloxone, ondansetron, oxyCODONE **AND** oxyCODONE, polyethylene glycol, prochlorperazine, senna-docusate 8.6-50 mg, simethicone, sodium chloride 0.9%, Flushing PICC Protocol **AND** sodium chloride 0.9% **AND** sodium chloride 0.9%     Review of Systems  Objective:     Weight: (!) 140.5 kg (309 lb 11.9 oz)  Body mass index is 48.51 kg/m².  Vital Signs (Most Recent):  Temp: 97.8 °F (36.6 °C) (02/12/23 1041)  Pulse: (!) 112 (02/12/23 1105)  Resp: 18 (02/12/23 1058)  BP: (!) 97/55 (02/12/23 1041)  SpO2: 99 % (02/12/23 1041)   Vital Signs (24h Range):  Temp:  [96.2 °F (35.7 °C)-99.2 °F (37.3 °C)] 97.8 °F (36.6 °C)  Pulse:  [] 112  Resp:  [14-20] 18  SpO2:  [95 %-100 %] 99 %  BP: ()/(51-64) 97/55     Date 02/12/23 0700 - 02/13/23 0659   Shift 7939-9677 6134-4506 6461-0248 24 Hour Total   INTAKE   Shift Total(mL/kg)       OUTPUT   Drains 140   140   Shift Total(mL/kg) 140(1)   140(1)   Weight (kg) 140.5 140.5 140.5 140.5                          Closed/Suction Drain 02/02/23 1401 Right Back Bulb 15 Fr. (Active)   Site Description Other (Comment) 02/10/23 2000   Dressing Type Other  (Comment) 02/10/23 2000   Dressing Status Clean;Dry;Intact 02/10/23 0752   Dressing Intervention Integrity maintained 02/10/23 0752   Drainage Serosanguineous 02/10/23 2000   Status Other (Comment) 02/10/23 2000   Output (mL) 80 mL 02/11/23 1038            Closed/Suction Drain 02/02/23 1402 Right Back Bulb 15 Fr. (Active)   Site Description Other (Comment) 02/10/23 2000   Dressing Type Transparent (Tegaderm) 02/10/23 0752   Dressing Status Dry;Clean;Intact 02/10/23 0752   Dressing Intervention Integrity maintained 02/10/23 0752   Drainage Serosanguineous 02/10/23 2000   Status To bulb suction 02/10/23 0752   Output (mL) 0 mL 02/11/23 0526            Closed/Suction Drain 02/02/23 1402 Left Back Bulb 15 Fr. (Active)   Site Description Other (Comment) 02/10/23 2000   Dressing Type Transparent (Tegaderm) 02/10/23 0752   Dressing Status Clean;Intact 02/10/23 0752   Dressing Intervention Integrity maintained 02/10/23 0752   Drainage Serosanguineous 02/10/23 2000   Status To bulb suction 02/10/23 0752   Output (mL) 50 mL 02/11/23 0526            Closed/Suction Drain 02/02/23 1402 Left Back Bulb 15 Fr. (Active)   Site Description Other (Comment) 02/10/23 2000   Dressing Type Transparent (Tegaderm) 02/10/23 0752   Dressing Status Intact;Dry;Clean 02/10/23 0752   Dressing Intervention Integrity maintained 02/10/23 0752   Drainage Serosanguineous 02/10/23 2000   Status Open to gravity drainage 02/10/23 0752   Output (mL) 0 mL 02/11/23 0526       Female External Urinary Catheter 02/10/23 2323 (Active)   Output (mL) 500 mL 02/11/23 0527       Neurosurgery Physical Exam  General: well developed, well nourished, no distress.   Head: normocephalic, atraumatic  Neurologic: Alert and oriented. Thought content appropriate.  GCS: Motor: 6/Verbal: 5/Eyes: 4 GCS Total: 15  Mental Status: Awake, Alert, Oriented x 4  Language: No aphasia  Speech: No dysarthria  Cranial nerves: face symmetric, tongue midline, CN II-XII grossly intact.    Eyes: pupils equal, round, reactive to light with accommodation, EOMI. Dysconjugate gaze.  Pulmonary: normal respirations, no signs of respiratory distress  Skin: Skin is warm, dry and intact.  Sensory: intact to light touch throughout  Motor Strength: Moves all extremities spontaneously.     Strength   Deltoids Triceps Biceps Wrist Extension Wrist Flexion Hand    Upper: R 5/5 5/5 5/5 5/5 5/5 5/5     L 5/5 5/5 5/5 5/5 5/5 5/5       Iliopsoas Quadriceps Knee  Flexion Tibialis  anterior Gastro- cnemius EHL   Lower: R 2/5 3/5 3/5 4+/5 4+/5 4+/5     L 2/5 3/5 3/5 4+/5 4+/5 4+/5      BLE strength/ROM limited due to pain, body habitus and severe bilateral leg edema.     Thoracolumbar incision: C/D/I with staples. Skin edges well approximated. No surrounding erythema or edema. No drainage or TTP.      MIHIR drains x 4 intact to gravity with ss output.    Significant Labs:  Recent Labs   Lab 02/11/23  0430 02/11/23  1144 02/11/23  1857 02/12/23  0528    112* 109 106  103   * 128* 128* 129*  129*   K 3.7 3.7 3.8 3.4*  3.5   CL 97 97 97 99  100   CO2 23 26 22* 21*  21*   BUN 24* 24* 26* 27*  27*   CREATININE 0.6 0.5 0.6 0.5  0.5   CALCIUM 7.7* 7.4* 7.8* 7.2*  7.1*   MG 2.1  --   --  1.8       Recent Labs   Lab 02/11/23  0430 02/12/23  0528   WBC 4.34 5.01   HGB 7.4* 7.7*   HCT 24.1* 24.6*   * 114*       No results for input(s): LABPT, INR, APTT in the last 48 hours.  Microbiology Results (last 7 days)       Procedure Component Value Units Date/Time    Culture, Anaerobic [[id number]] Collected: 02/10/23 1430    Order Status: Completed Specimen: Ascites Updated: 02/12/23 1037     Anaerobic Culture Culture in progress    Aerobic culture [[id number]] Collected: 02/10/23 1430    Order Status: Completed Specimen: Ascites Updated: 02/12/23 0754     Aerobic Bacterial Culture Insufficient incubation, culture in progress    AFB Culture & Smear [[id number]]     Order Status: Completed Specimen: Ascites      Gram stain [[id number]] Collected: 02/10/23 1430    Order Status: Completed Specimen: Ascites Updated: 02/11/23 0333     Gram Stain Result No WBC's      No organisms seen    Fungus culture [[id number]] Collected: 01/24/23 0913    Order Status: Completed Specimen: Body Fluid from Back Updated: 02/08/23 0655     Fungus (Mycology) Culture Culture in progress      No fungus isolated after 2 weeks    Narrative:      1) Perispinal    Fungus culture [[id number]] Collected: 01/24/23 0943    Order Status: Completed Specimen: Bone from Back Updated: 02/08/23 0655     Fungus (Mycology) Culture Culture in progress      No fungus isolated after 2 weeks    Narrative:      3) Perispinal    Fungus culture [[id number]] Collected: 01/24/23 0943    Order Status: Completed Specimen: Bone from Back Updated: 02/08/23 0655     Fungus (Mycology) Culture Culture in progress      No fungus isolated after 2 weeks    Narrative:      4) Perispinal    Fungus culture [[id number]] Collected: 01/24/23 0913    Order Status: Completed Specimen: Body Fluid from Back Updated: 02/08/23 0655     Fungus (Mycology) Culture Culture in progress      No fungus isolated after 2 weeks    Narrative:      2) Perispinal          All pertinent labs from the last 24 hours have been reviewed.    Significant Diagnostics:  I have reviewed all pertinent imaging results/findings within the past 24 hours.  Physical Exam

## 2023-02-11 NOTE — ASSESSMENT & PLAN NOTE
Pt is 42F multiple spinal surgeries and revisions last T10- Pelvis in March 2022 who presented to OSH with concern for shoulder infection and UTI found to have E coli sepsis who has had progressive worsening BLE weakness. 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 Transferred to [facility] to  and neurosurgery was consulted.    OR 1/24 for temporary T6-12 PSIF. Taz pus noted intraoperatively.   OR 2/2 for T4-pelvis revision and extension of fusion with T9-10 corpectomy with plastic surgery assistance.  Maps goals > 85 completed 2/5 in ICU.    Plan:  --Stepped down to  floor.   -q4h neurochecks.  --All labs & diagnostics reviewed.   -post op xrays pending. Obtain upright when pt able to stand or sit, prior to discharge.  --TLSO to be worn when OOB only.  --MIHIR drains x4 to gravity, monitor output qshift. Per plastics - keep all drains until discharge -- management of drains per plastics   --Plastic surgery managing incision. Open to air.   - Off-load pressure as much as possible to promote wound healing.   - Elevate with wedge, alternate sides Q2H  --Thoracic discitis: BCx 1/20 no growth. OR cultures 1/24 grew ESBL E. coli   -- ID consulted. Recs for IV meropenem with estimated end date of 3/29/23.  --Pain Control: Continue multimodal regimen. PCA pump discontinued 2/8 with poor pain control subsequently.    - Anesthesia Pain Service consulted due to refractory pain, appreciate assistance.   - No NSAIDs as pt s/p recent instrumented fusion.  --DVT PPx: SQH/TEDs/SCDs.  --NSGY will continue to follow. Please contact team with any further questions.

## 2023-02-11 NOTE — PROGRESS NOTES
[facility] - Telemetry [facility] Medicine  Progress Note    Patient Name: Rachel Zazueta  MRN: [id number]  Patient Class: IP- Inpatient   Admission Date: 1/19/2023  Length of Stay: 23 days  Attending Physician: Vincent Bal MD  Primary Care Provider: Dannielle Merino DO        Subjective:     Principal Problem:Weakness of both lower extremities        HPI:  Ms. Zazueta is a 42 y.o F w/ hx ofIV drug use (methamphetamine), chronic HCV with cirrhosis, spinal fusion (04/2021), epidural abscess with removal of hardware (02/2022), spinal fusion with hardware (T10 - Pelvis / 03/2022), obesity (BMI 39.3) and neurogenic bladder who initially presented to [facility] for evaluation of back pain and left leg weakness.  She reports initially noting the left leg weakness when she was about to go to the bathroom and was about to fall but was assisted by her boyfriend.  She was brought to the ED via EMS.  Urinalysis with UTI concerns and blood cultures at OSH grew ESBL E coli so she was treated with meropenem.  During hospitalization, she reports the weakness that started out in her left leg initially then spread upwards to her left thigh, left hip, then crossed over to the right hip and spread to her right leg.  Denies recent IV drug use. She reports her last incidence of drug use was more than 3 years ago and also denies tobacco, and alcohol abuse.  Reports cannabis use.    She also reports more than 10 years ago she had an episode of a headache that lasted about 4 days and was associated with residual defects of a strabismus in the left eye with associated visual disturbances.  She also reports over the past few years her friends have noticed that she sometimes has word-finding difficulty during conversations.     Reports shortness of breath when sitting up, fever, chills, back pain, lower extremity weakness(initally L>R, but now R>L), diarrhea.  Denies cough, nausea, vomiting, constipation, bladder or  bowel incontinence, dysuria, dark urine, new vision changes.    OSH course notable for concerning urinalysis and blood cultures positive for ESBL E coli treated with meropenem.  She was also admit to the ICU where she was treated with Precedex for significant body aches, irritability, and muscle spasms.  Concerns of a murmur on physical exam so she underwent TTE which did not show any vegetations.  Worsening lower extremity weakness workup with MRI head nonspecific, MRI cervical spine with multilevel spondylosis, X-ray spine with multilevel thoracolumbar fusion and diskectomy, focal kyphosis at T9-10 increased compared to prior.  She was started on prophylaxis amoxicillin due to her history of infected hardware.  MRI spine unable to be completed due to patient's body habitus so [place] [facility] [place] and [place]surgery, Neurology evaluation.      Overview/Hospital Course:  Pt admitted as a transfer from [facility] for worsening LE weakness, concern for spinal infection, and need for MRI which could not be done at OSH. Imaging was completed here. Worsening proximal junctional kyphosis noted at T9-10 noted with concern for discitis at T8-9, T9-10. NSGY evaluated.     Found to have T8-T10 kyphosis on MRI. 1/24 underwent laminectomy T8-T10; posterior fusion T8-T12; and washout. 2/2 underwent T4-pelvis fusion and T9-T10 corpectomy.  ID consulted for thoracic discitis infection.  Patient to complete 8 weeks of therapy with meropenem. Patient underwent flap closure with Plastic surgery.  Step-down Hospital Medicine on 02/08.    2/9   On meropenem for MDR-ESCHERICHIA COLI ESBL  sodium trended down to 129.  Neurosurgery following post OR and aiding in pain management. PCA discontinued 2/8 . On oxycodone 10mg PO q4h . requiring IV dilaudid q4 as needed,. drains to gravity output 990ml/24h . Plastic surgery following flap, managing wounds.  Wound VAC discontinued on Wednesday.  accepted to [facility] pain  management consulted for back ache- switched to multimodal therapy. sodium trended down to 127.   2/10 sodium stable at 127 . continues with increased drain output 985ml/24h . negative balance of  2.8 L lasix on hold.  nephrology consulted. abdominal X ray -  Nonobstructive bowel gas pattern.  No free air.  As before, mild gaseous distension of stomach. ammonia at 57 . sono abdomen  . tylenol changed to 1000mg q12h with cirrhosis . sono abdomen -Cirrhotic liver morphology.  Sequela of portal hypertension including moderate ascites, recanalized umbilical vein.  No focal hepatic lesions. Cholecystectomy. . Nephrology recs  fluid restriction to 1 L,  lasix 60 mg IV daily,  and trend sodium q12h.  diazepam discontinued . s/p IR paracentesis today   2/11no evidence of SBP on paracentesis. 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 AFB and Triglyerides on ascitic fluid.  noncompliant with  fluid restriction to 1 L despite counseling. sodium trended down to 126 .received 2 doses of IV lasix 60mg. drain output 250ml. Increased Lasix IV 80 BID  due to UOP 500ml/24h         Review of Systems:   Pain scale: 6/10   Constitutional:  fever,  chills, headache, vision loss, hearing loss, weight loss, Generalized weakness, falls, loss of smell, loss of taste, poor appetite,  sore throat  Respiratory: cough, shortness of breath.   Cardiovascular: chest pain, dizziness, palpitations, orthopnea, swelling of feet, syncope  Gastrointestinal: nausea, vomiting, abdominal pain-improved  diarrhea, black stool,  blood in stool, change in bowel habits  Genitourinary: hematuria, dysuria, urgency, frequency  Integument/Breast: rash,  pruritis,  Surgical wound - back   Hematologic/Lymphatic: easy bruising, lymphadenopathy  Musculoskeletal: arthralgias , myalgias, back pain, neck pain, knee pain  Neurological: confusion, seizures, tremors, slurred speech  Behavioral/Psych:  depression,  anxiety, auditory or visual hallucinations     OBJECTIVE:     Physical Exam:  Body mass index is 48.51 kg/m².    Constitutional: Appears well-developed and well-nourished. severe obesity  Head: Normocephalic and atraumatic.   Neck: Normal range of motion. Neck supple.   Cardiovascular: Normal heart rate.  Regular heart rhythm.  Pulmonary/Chest: Effort normal.   Abdominal:+  distension.  No tenderness  Musculoskeletal: Normal range of motion. ++ edema. drains in place   Neurological: Alert and oriented to person, place, and time. LE strength - 3-4/5   Skin: Skin is warm and dry.   Psychiatric: Normal mood and affect. Behavior is normal.                  Vital Signs  Temp: 97.9 °F (36.6 °C) (02/11/23 1129)  Pulse: (Abnormal) 116 (02/11/23 1507)  Resp: 18 (02/11/23 1430)  BP: (Abnormal) 96/51 (02/11/23 1129)  SpO2: 96 % (02/11/23 1430)     24 Hour VS Range    Temp:  [97.9 °F (36.6 °C)-98.3 °F (36.8 °C)]   Pulse:  [100-116]   Resp:  [16-19]   BP: ()/(50-64)   SpO2:  [96 %-98 %]     Intake/Output Summary (Last 24 hours) at 2/11/2023 1514  Last data filed at 2/11/2023 1100  Gross per 24 hour   Intake 1883.19 ml   Output 1410 ml   Net 473.19 ml         I/O This Shift:  I/O this shift:  In: -   Out: 680 [Urine:600; Drains:80]    Wt Readings from Last 3 Encounters:   02/10/23 (Abnormal) 140.5 kg (309 lb 11.9 oz)   01/19/23 110.6 kg (243 lb 12.8 oz)   01/19/23 110.6 kg (243 lb 13.3 oz)       I have personally reviewed the vitals and recorded Intake/Output     Laboratory/Diagnostic Data:    CBC/Anemia Labs: Coags:    Recent Labs   Lab 02/09/23  0356 02/10/23  0236 02/11/23  0430   WBC 4.99 5.53 4.34   HGB 8.1* 7.4* 7.4*   HCT 26.2* 24.4* 24.1*   * 108* 102*   MCV 95 95 93   RDW 21.1* 20.7* 20.1*    Recent Labs   Lab 02/06/23  1047   INR 1.3*   APTT 36.6*        Chemistries: ABG:   Recent Labs   Lab 02/09/23  0356 02/09/23  1700 02/10/23  0236 02/10/23  0631 02/10/23  1751 02/11/23  0430 02/11/23  1144   *    < > 134*  135*   < > 128* 126* 128*   K 4.2   < > 3.5  3.6   < > 4.1 3.7 3.7   CL 99   < > 101  101   < > 97 97 97   CO2 24   < > 23  22*   < > 23 23 26   BUN 21*   < > 23*  23*   < > 25* 24* 24*   CREATININE 0.6   < > 0.5  0.6   < > 0.6 0.6 0.5   CALCIUM 7.6*   < > 6.9*  6.9*   < > 7.6* 7.7* 7.4*   PROT 5.1*  --  4.5*  --   --  5.0*  --    BILITOT 1.7*  --  1.4*  --   --  1.4*  --    ALKPHOS 149*  --  142*  --   --  141*  --    ALT 31  --  26  --   --  24  --    AST 42*  --  36  --   --  35  --    MG 1.9  --  1.8  --   --  2.1  --    PHOS 2.8   < > 1.7*  1.6*   < > 3.2 2.9 2.8    < > = values in this interval not displayed.    No results for input(s): PH, PCO2, PO2, HCO3, POCSATURATED, BE in the last 168 hours.       POCT Glucose: HbA1c:    No results for input(s): POCTGLUCOSE in the last 168 hours. Hemoglobin A1C   Date Value Ref Range Status   04/16/2021 4.6 4.0 - 5.6 % Final     Comment:     ADA Screening Guidelines:  5.7-6.4%  Consistent with prediabetes  >or=6.5%  Consistent with diabetes    High levels of fetal hemoglobin interfere with the HbA1C  assay. Heterozygous hemoglobin variants (HbS, HgC, etc)do  not significantly interfere with this assay.   However, presence of multiple variants may affect accuracy.     05/14/2019 4.2 4.0 - 5.6 % Final     Comment:     ADA Screening Guidelines:  5.7-6.4%  Consistent with prediabetes  >or=6.5%  Consistent with diabetes  High levels of fetal hemoglobin interfere with the HbA1C  assay. Heterozygous hemoglobin variants (HbS, HgC, etc)do  not significantly interfere with this assay.   However, presence of multiple variants may affect accuracy.     11/24/2018 4.2 4.0 - 5.6 % Final     Comment:     ADA Screening Guidelines:  5.7-6.4%  Consistent with prediabetes  >or=6.5%  Consistent with diabetes  High levels of fetal hemoglobin interfere with the HbA1C  assay. Heterozygous hemoglobin variants (HbS, HgC, etc)do  not significantly interfere with this assay.    However, presence of multiple variants may affect accuracy.          Cardiac Enzymes: Ejection Fractions:    Recent Labs     02/08/23  2258   TROPONINI <0.006    EF   Date Value Ref Range Status   01/20/2023 58 % Final   12/23/2022 64 % Final          No results for input(s): COLORU, APPEARANCEUA, PHUR, SPECGRAV, PROTEINUA, GLUCUA, KETONESU, BILIRUBINUA, OCCULTUA, NITRITE, UROBILINOGEN, LEUKOCYTESUR, RBCUA, WBCUA, BACTERIA, SQUAMEPITHEL, HYALINECASTS in the last 48 hours.    Invalid input(s): WRIGHTSUR    Procalcitonin (ng/mL)   Date Value   02/14/2022 0.05   02/14/2022 0.06   04/14/2021 0.04   11/24/2018 0.37 (H)     Lactate (Lactic Acid) (mmol/L)   Date Value   01/10/2023 2.0   12/23/2022 1.8   12/23/2022 3.6 (HH)   11/14/2022 1.4   02/14/2022 1.1     BNP (pg/mL)   Date Value   11/24/2018 90   03/14/2017 87     CRP   Date Value   01/20/2023 9.4 mg/L (H)   01/10/2023 3.47 mg/dL (H)   11/16/2022 45.9 mg/L (H)   11/14/2022 11.10 mg/dL (H)   07/23/2022 1.93 mg/dL (H)   07/01/2022 1.53 mg/dL (H)   02/24/2022 7.1 mg/L   02/14/2022 29.2 mg/L (H)   02/14/2022 30.8 mg/L (H)   04/14/2021 47.5 mg/L (H)     Sed Rate (mm/Hr)   Date Value   01/20/2023 70 (H)   01/10/2023 55 (H)   11/16/2022 84 (H)   11/14/2022 67 (H)   02/24/2022 8   02/14/2022 57 (H)   02/14/2022 82 (H)   04/14/2021 57 (H)   05/13/2019 35 (H)   03/21/2017 21 (H)     No results found for: DDIMER  Ferritin (ng/mL)   Date Value   02/07/2022 126   03/21/2017 52   03/14/2017 67   12/06/2016 80     LD (U/L)   Date Value   04/24/2021 250   03/14/2017 253     Troponin I (ng/mL)   Date Value   02/08/2023 <0.006   11/24/2018 <0.006   03/20/2017 0.01   03/14/2017 <0.01     CPK (U/L)   Date Value   01/20/2023 48   02/14/2022 64   03/15/2017 44     No results found for this or any previous visit.  SARS-CoV2 (COVID-19) Qualitative PCR (no units)   Date Value   03/14/2022 Not Detected   02/20/2022 Not Detected   04/27/2021 Not Detected   04/22/2021 Not Detected      SARS-CoV-2 RNA, Amplification, Qual (no units)   Date Value   03/02/2022 Negative     POC Rapid COVID (no units)   Date Value   12/23/2022 Negative   12/10/2022 Negative   02/14/2022 Negative   04/14/2021 Negative       Microbiology labs for the last week  Microbiology Results (last 7 days)       Procedure Component Value Units Date/Time    AFB Culture & Smear [[id number]]     Order Status: Completed Specimen: Ascites     Gram stain [[id number]] Collected: 02/10/23 1430    Order Status: Completed Specimen: Ascites Updated: 02/11/23 0333     Gram Stain Result No WBC's      No organisms seen    Aerobic culture [[id number]] Collected: 02/10/23 1430    Order Status: Sent Specimen: Ascites Updated: 02/10/23 1753    Culture, Anaerobic [[id number]] Collected: 02/10/23 1430    Order Status: Sent Specimen: Ascites Updated: 02/10/23 1753    Fungus culture [[id number]] Collected: 01/24/23 0913    Order Status: Completed Specimen: Body Fluid from Back Updated: 02/08/23 0655     Fungus (Mycology) Culture Culture in progress      No fungus isolated after 2 weeks    Narrative:      1) Perispinal    Fungus culture [[id number]] Collected: 01/24/23 0943    Order Status: Completed Specimen: Bone from Back Updated: 02/08/23 0655     Fungus (Mycology) Culture Culture in progress      No fungus isolated after 2 weeks    Narrative:      3) Perispinal    Fungus culture [[id number]] Collected: 01/24/23 0943    Order Status: Completed Specimen: Bone from Back Updated: 02/08/23 0655     Fungus (Mycology) Culture Culture in progress      No fungus isolated after 2 weeks    Narrative:      4) Perispinal    Fungus culture [[id number]] Collected: 01/24/23 0913    Order Status: Completed Specimen: Body Fluid from Back Updated: 02/08/23 0655     Fungus (Mycology) Culture Culture in progress      No fungus isolated after 2 weeks    Narrative:      2) Perispinal            Reviewed and noted in plan where applicable- Please see chart for full lab  data.    Lines/Drains:  PICC Double Lumen 01/26/23 1209 left basilic (Active)   Line Necessity Review Longterm central access required 02/08/23 1948   Verification by X-ray Yes 02/08/23 1948   Site Assessment No warmth;No swelling 02/08/23 1948   Extremity Assessment Distal to IV No abnormal discoloration 02/08/23 1948   Line Securement Device Secured with sutureless device 02/08/23 0730   Dressing Type Biopatch in place 02/08/23 1948   Dressing Status Clean;Dry;Intact 02/08/23 1948   Dressing Intervention Integrity maintained 02/08/23 1948   Date on Dressing 02/06/23 02/08/23 1948   Dressing Due to be Changed 02/13/23 02/08/23 1948   Distal Patency/Care flushed w/o difficulty 02/08/23 1948   Proximal 1 Patency/Care flushed w/o difficulty 02/08/23 1948   Current Insertion Depth (cm) 39 cm 01/26/23 1207   Current Exposed Catheter (cm) 0 cm 01/26/23 1207   Extremity Circumference (cm) 35 cm 01/26/23 1207   Waveform Not being transduced 02/08/23 0730   Number of days: 13            Midline Catheter Insertion/Assessment  - Single Lumen 01/12/23 2138 Right basilic vein (medial side of arm) 18g x 10cm (Active)   $ Midline Charges (Upon insertion) Bedside Insertion Performed Pt > 3 Years Old;Midline Catheter (Supply);Ultrasound Guide for Vascular Access 01/12/23 2142   Site Assessment Clean;Dry;Intact 02/08/23 1948   IV Device Securement catheter securement device 02/08/23 1948   Line Status Saline locked 02/08/23 1948   Dressing Type Biopatch in place 02/08/23 1948   Dressing Status Clean;Dry;Intact 02/08/23 1948   Dressing Intervention Integrity maintained 02/08/23 0730   Dressing Change Due 02/06/23 02/08/23 0730   Site Change Due 02/13/23 02/08/23 0730   Reason Not Rotated Not due 02/08/23 0730   Number of days: 27            Closed/Suction Drain 02/02/23 1401 Right Back Bulb 15 Fr. (Active)   Site Description Healing 02/08/23 1948   Dressing Type Transparent (Tegaderm) 02/08/23 1948   Dressing Status Clean;Dry;Intact  02/08/23 1948   Dressing Intervention Integrity maintained 02/08/23 1948   Drainage Serosanguineous 02/08/23 1948   Status Open to gravity drainage 02/08/23 1948   Output (mL) 10 mL 02/09/23 0530   Number of days: 6            Closed/Suction Drain 02/02/23 1402 Right Back Bulb 15 Fr. (Active)   Site Description Healing 02/08/23 1948   Dressing Type Transparent (Tegaderm) 02/08/23 1948   Dressing Status Clean;Dry;Intact 02/08/23 1948   Dressing Intervention Integrity maintained 02/08/23 1948   Drainage Serosanguineous 02/08/23 1948   Status Open to gravity drainage 02/08/23 1948   Output (mL) 30 mL 02/09/23 0530   Number of days: 6            Closed/Suction Drain 02/02/23 1402 Left Back Bulb 15 Fr. (Active)   Site Description Healing 02/08/23 1948   Dressing Type Transparent (Tegaderm) 02/08/23 1948   Dressing Status Clean;Dry;Intact 02/08/23 1948   Dressing Intervention Integrity maintained 02/08/23 1948   Drainage Serosanguineous 02/08/23 1948   Status Open to gravity drainage 02/08/23 1948   Output (mL) 15 mL 02/09/23 0530   Number of days: 6            Closed/Suction Drain 02/02/23 1402 Left Back Bulb 15 Fr. (Active)   Site Description Healing 02/08/23 1948   Dressing Type Transparent (Tegaderm) 02/08/23 1948   Dressing Status Clean;Dry;Intact 02/08/23 1948   Dressing Intervention Integrity maintained 02/08/23 1948   Drainage Serosanguineous 02/08/23 1948   Status Open to gravity drainage 02/08/23 1948   Output (mL) 10 mL 02/09/23 0530   Number of days: 6       Imaging      Results for orders placed during the hospital encounter of 01/19/23    Echo Saline Bubble? Yes    Interpretation Summary  · Study is negative for intercardiac shunt that is right to left ( see text).  · The left ventricle is normal in size with concentric remodeling and normal systolic function.  · The estimated ejection fraction is 58%.  · There are segmental left ventricular wall motion abnormalities.  · Normal left ventricular diastolic  function.  · Normal right ventricular size with normal right ventricular systolic function.  · Mild right atrial enlargement.  · Normal central venous pressure (3 mmHg).  · The estimated PA systolic pressure is 26 mmHg.      IR Paracentesis with Imaging  Narrative: EXAMINATION:  Ultrasound-guided paracentesis    Procedural Personnel    Attending physician(s): Ivonne Juares MD    Fellow physician(s): None    Resident physician(s): None    Advanced practice provider(s): Samantha Lucero PA-C    Pre-procedure diagnosis: Ascites    Post-procedure diagnosis: Same    Complications: No immediate complications.    CLINICAL HISTORY:  Recurrent Ascites    TECHNIQUE:  - Ultrasound-guided paracentesis    COMPARISON:  Abdominal ultrasound 1/20/23    FINDINGS:  Pre-procedure    Consent: Informed consent for the procedure was obtained and time-out was performed prior to the procedure.    Preparation: The site was prepared and draped using maximal sterile barrier technique including cutaneous antisepsis.    Anesthesia/sedation    Level of anesthesia/sedation: No sedation    Anesthesia/sedation administered by: Not applicable    Total intra-service sedation time (minutes): 0    Limited abdominal ultrasound    Limited abdominal ultrasound was performed.    Large ascites. A safe window for paracentesis was identified.    Paracentesis    Local anesthesia was administered. The peritoneal cavity was accessed on the right lower quadrant and fluid return confirmed position. Ascites was drained.    Paracentesis access technique: Real-time ultrasound guidance    Catheter placed: 5F Yueh    Closure    The catheter was removed. A sterile bandage was applied.    Post-drainage ultrasound: Not performed    Additional Details    Additional description of procedure: None    Equipment details: None    Specimens removed: Abdominal fluid    Estimated blood loss (mL): Less than 10    Standardized report: [email or web address]_[email or web address]    Attestation    Signer  name: Ivonne Juares    I attest that I reviewed the stored images and agree with the report as written.  Impression: Ultrasound-guided paracentesis with drainage of 5000 mL of chylous fluid.    _______________________________________________________________    Electronically signed by resident: Samantha Lucero  Date:    02/10/2023  Time:    15:27    Electronically signed by: Ivonne Juares  Date:    02/10/2023  Time:    15:34  US Abdomen Limited  Narrative: EXAMINATION:  US ABDOMEN LIMITED    CLINICAL HISTORY:  RUQ pain;    TECHNIQUE:  Limited ultrasound of the right upper quadrant of the abdomen (including pancreas, liver, gallbladder, common bile duct, and spleen) was performed.    COMPARISON:  Abdominal ultrasound 01/20/2023.  CT renal stone 04/09/2022.    FINDINGS:  Pancreas: Mostly obscured by overlying bowel gas.  Visualized portions of the pancreas appear unremarkable.    Liver: Normal in size, measuring 12.7 cm. Nodular hepatic contour with coarsened parenchymal echotexture, suggesting cirrhosis.  Hepatorenal index measures 1.1.  No focal hepatic lesions.    Gallbladder: The gallbladder is surgically absent.    Biliary system: The common duct is not dilated, measuring 3 mm.  No intrahepatic ductal dilatation.    Spleen: Upper normal in size and homogeneous echotexture, measuring 11.6 x 7.9 cm.    Miscellaneous: Moderate ascites.  Right pleural effusion.  Recanalized umbilical vein.  Impression: Cirrhotic liver morphology.  Sequela of portal hypertension including moderate ascites, recanalized umbilical vein.  No focal hepatic lesions.    Cholecystectomy.    Right pleural effusion.    Electronically signed by resident: Calvin Betancourt  Date:    02/10/2023  Time:    09:21    Electronically signed by: Curt Carnes  Date:    02/10/2023  Time:    09:51  X-Ray Abdomen AP 1 View  Narrative: EXAMINATION:  XR ABDOMEN AP 1 VIEW    CLINICAL HISTORY:  pain;    TECHNIQUE:  AP View(s) of the abdomen was  performed.    COMPARISON:  February 6, 2023    FINDINGS:  Stable partially visualized extensive spinal hardware-surgical changes.  As before, there is seen to be paralleling lucency with respect to bilateral screws transfixing SI joints.  Clinical correlation.  Reconfirmed areas of skin sutures.  Reconfirmed partially visualized the some small opaque drains.  Additional EKG leads superimpose upper abdomen.  Nonobstructive bowel gas pattern.  No free air.  As before, mild gaseous distension of stomach.  Impression: As above    Electronically signed by: Brandan Aguilar  Date:    02/10/2023  Time:    08:11      Labs, Imaging, EKG and Diagnostic results from 2/11/2023 were reviewed.    Medications:  Medication list was reviewed and changes noted under Assessment/Plan.  No current facility-administered medications on file prior to encounter.     Current Outpatient Medications on File Prior to Encounter   Medication Sig Dispense Refill    albuterol (ACCUNEB) 1.25 mg/3 mL Nebu Take 3 mLs (1.25 mg total) by nebulization every 6 (six) hours as needed (shortness of breath). Rescue 75 mL 0    buPROPion (WELLBUTRIN) 100 MG tablet Take 1 tablet (100 mg total) by mouth 2 (two) times daily. 60 tablet 2    folic acid (FOLVITE) 1 MG tablet Take 1 tablet (1 mg total) by mouth once daily. (Patient not taking: Reported on 11/25/2022) 42 tablet 0    gabapentin (NEURONTIN) 300 MG capsule Take 1 capsule (300 mg total) by mouth 3 (three) times daily. 90 capsule 11    lactulose (CHRONULAC) 20 gram/30 mL Soln Take 30 mLs (20 g total) by mouth 3 (three) times daily. 2700 mL 2    pantoprazole (PROTONIX) 40 MG tablet Take 1 tablet (40 mg total) by mouth once daily. 30 tablet 1    [DISCONTINUED] diclofenac sodium (VOLTAREN) 1 % Gel Apply 2 g topically 4 (four) times daily. 50 g 0     Scheduled Medications:  acetaminophen, 1,000 mg, Oral, Q12H  bisacodyL, 10 mg, Rectal, Every other day  buPROPion, 300 mg, Oral, Daily  dronabinoL, 2.5 mg, Oral,  BID  furosemide (LASIX) injection, 80 mg, Intravenous, Q12H  gabapentin, 900 mg, Oral, TID  heparin (porcine), 5,000 Units, Subcutaneous, Q8H  lactulose, 20 g, Oral, TID  meropenem (MERREM) IVPB, 2 g, Intravenous, Q8H  methocarbamoL, 1,000 mg, Oral, Q6H  pantoprazole, 40 mg, Oral, Daily  sodium chloride 0.9%, 10 mL, Intravenous, Q6H  spironolactone, 25 mg, Oral, Daily      PRN: sodium chloride, sodium chloride, dextrose 10%, dextrose 10%, glucagon (human recombinant), glucose, glucose, hydrALAZINE, HYDROmorphone, magnesium hydroxide 400 mg/5 ml, melatonin, naloxone, ondansetron, oxyCODONE **AND** oxyCODONE, polyethylene glycol, prochlorperazine, senna-docusate 8.6-50 mg, simethicone, sodium chloride 0.9%, Flushing PICC Protocol **AND** sodium chloride 0.9% **AND** sodium chloride 0.9%  Infusions:   Estimated Creatinine Clearance: 215.7 mL/min (based on SCr of 0.5 mg/dL).    Assessment/Plan:      * Weakness of both lower extremities  42 year old woman s/p multiple spinal surgeries presented to OSH with possible infection of shoulder. Found to have UTI and E. Coli bacteremia and progressively worse BLE weakness. Transferred to C for neurosurgical evaluation. Underwent Laminectomy T8-T10; Posterior spinal fusion T8-T12; hardware removal and washout on 1/24. Admitted to [facility] postop. On 2/2 underwent T4-pelvis fusion and T9-T10 corpectomies. To complete 8 week course of meropenem per ID for ESBL E coli from bone culture, end date 3/29.     - neuro checks q4h  - HV drains x 2 in place, management per NSGY  - meropenem for ESBL bacteremia per ID, eot 3/29/23  - Plastic surgery follow, wound vac discontinued  - CMP, Mag, Phos, CBC daily  - MAP goals >65, SBP < 180  - PRN labetalol, hydralazine  - MM pain regimen: PRN Dilaudid, Tylenol, gabapentin, robaxin, PRN oxycodone  - Aggressive bowel regimen  - PT/OT   2/9   On meropenem for MDR-ESCHERICHIA COLI ESBL  sodium trended down to 129.  Neurosurgery following post OR and  aiding in pain management. PCA discontinued 2/8 . On oxycodone 10mg PO q4h . requiring IV dilaudid q4 as needed,. drains to gravity output 990ml/24h . Plastic surgery following flap, managing wounds.  Wound VAC discontinued on Wednesday.  accepted to [facility]   2/11 needs post op xrays when able to stand or sit, prior to discharge    Ascites due to alcoholic cirrhosis  2/10  . sono abdomen -Cirrhotic liver morphology.  Sequela of portal hypertension including moderate ascites, recanalized umbilical vein.  No focal hepatic lesions. Cholecystectomy. s/p IR paracentesisas above      Abdominal pain  2/10  abdominal X ray -  Nonobstructive bowel gas pattern.  No free air.  As before, mild gaseous distension of stomach. ammonia at 57 . sono abdomen with ascitis . simethicone PRN  2/11no evidence of SBP on paracentesis. 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 AFB and Triglyerides on ascitic fluid.      Hyponatremia  - Patient with sodium   Recent Labs   Lab 02/10/23  0631 02/10/23  1751 02/11/23  0430   * 128* 126*   . sodium trending down    2/9 monitor on lasix BID.sodium trended down to 127.   2/10 sodium stable at 127 . continues with increased drain output 985ml/24h . negative balance of  2.8 L lasix on hold.  nephrology consulted.  Nephrology recs  fluid restriction to 1 L,  lasix 60 mg IV daily,  and trend sodium q12h.  diazepam discontinued   2/11 .  noncompliant with  fluid restriction to 1 L despite counseling. sodium trended down to 126 .received 2 doses of IV lasix 60mg. Increased Lasix IV 80 BID  due to UOP 500ml/24h     Acute blood loss anemia  Hb 6.4 on 2/6, received 1U pRBCs. Hb 2/7 WNL.  - monitor Hb  - transfuse to maintain Hb >7  2/9    Patient's with Normocytic anemia.. Hemoglobin stable. Etiology likely due to chronic disease .  Current CBC reviewed-    Recent Labs   Lab 02/09/23  0356 02/10/23  0236 02/11/23  0430   HGB 8.1* 7.4* 7.4*     Monitor CBC and transfuse if H/H drops below 7/21.       Kyphosis of thoracolumbar region  See weakness    Thoracic discitis  See Weakness of both lower extremities    Anxiety and depression  Evaluated by Psychiatry at OSH prior to transfer. Recommended increasing bupropion.  - Continue bupropion 300 mg QD  - Gabapentin 600 mg TID for anxiety and neuropathy  - PRN diazepam 5 mg q6h      Severe obesity (BMI >= 40)  Body mass index is 48.51 kg/m². Morbid obesity complicates all aspects of disease management from diagnostic modalities to treatment. Weight loss encouraged        Substance use disorder  Denies IV drug use (meth) for past 3 years. Denies alcohol and tobacco abuse.     Chronic hepatitis C with cirrhosis  See above    Cirrhosis of liver with ascites  MELD-Na score: 11 at 2/8/2023  4:55 AM  MELD score: 11 at 2/8/2023  4:55 AM  Calculated from:  Serum Creatinine: 0.5 mg/dL (Using min of 1 mg/dL) at 2/8/2023  4:55 AM  Serum Sodium: 131 mmol/L at 2/8/2023  4:55 AM  Total Bilirubin: 1.6 mg/dL at 2/8/2023  4:55 AM  INR(ratio): 1.3 at 2/6/2023 10:47 AM  Age: 42 years      Patient has reportedly refused transplant evaluation in the past.   - Daily CMP and trend LFTs  - Continue Lactulose 20 g TID  - Lasix 40 mg PO BID  - Will need Hepatology follow up outpatient  2/10   ammonia at 57 . sono abdomen  . tylenol changed to 1000mg q12h with cirrhosis . s/p IR paracentesis today   2/11no evidence of SBP on paracentesis. 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 AFB and Triglyerides on ascitic fluid.        VTE Risk Mitigation (From admission, onward)           Ordered     heparin (porcine) injection 5,000 Units  Every 8 hours         02/04/23 0848     IP VTE HIGH RISK PATIENT  Once         01/19/23 2153     Place sequential compression device  Until discontinued         01/19/23 2153     Reason for No Pharmacological VTE Prophylaxis  Once        Question:   Reasons:  Answer:  Physician Provided (leave comment)  Comment:  Potential NSGY procedure    01/19/23 2153                    Discharge Planning   SHIRA: 2/14/2023     Code Status: Partial Code   Is the patient medically ready for discharge?: No    Reason for patient still in hospital (select all that apply): Treatment  Discharge Plan A: Long-term acute care facility (LTAC)   Discharge Delays: None known at this time              Vincent Bal MD  Department of Hospital Medicine   [facility] - Telemetry Stepdown

## 2023-02-11 NOTE — PROGRESS NOTES
[facility] - Telemetry Stepdown  Neurosurgery  Progress Note    Subjective:     History of Present Illness: Ms. Zazueta is a 42 y.o F w/ hx ofIV drug use (methamphetamine), chronic HCV with cirrhosis, spinal fusion (04/2021), epidural abscess with removal of hardware (02/2022), spinal fusion with hardware (T10 - Pelvis / 03/2022), obesity (BMI 39.3) and neurogenic bladder and recent shoulder surgery who initially presented to [facility] for evaluation of back pain and left leg weakness.  She reports initially noting the left leg weakness when she was about to go to the bathroom and was about to fall but was assisted by her boyfriend.  She was brought to the ED via EMS.  Urinalysis with UTI concerns and blood cultures at OSH grew ESBL E coli, and shoulder effusion concern for infection so she was treated with meropenem.  During hospitalization, she reports the weakness that started out in her left leg initially then spread upwards to her left thigh, left hip, then crossed over to the right hip and spread to her right leg.  Denies recent IV drug use. She reports her last incidence of drug use was more than 3 years ago and also denies tobacco, and alcohol abuse.  Reports cannabis use.     OSH course notable for concerning urinalysis and blood cultures positive for ESBL E coli treated with meropenem.  She was also admit to the ICU where she was treated with Precedex for significant body aches, irritability, and muscle spasms.  Concerns of a murmur on physical exam so she underwent TTE which did not show any vegetations.  Worsening lower extremity weakness workup with MRI head nonspecific, MRI cervical spine with multilevel spondylosis, X-ray spine with multilevel thoracolumbar fusion and diskectomy, focal kyphosis at T9-10 increased compared to prior.  She was started on prophylaxis amoxicillin due to her history of infected hardware.  MRI spine unable to be completed due to patient's body habitus so she was  transferred to [facility] [place] further imaging and Neurosurgery, Neurology evaluation.      Post-Op Info:  Procedure(s) (LRB):  LAMINECTOMY, SPINE, THORACIC, WITH FUSION (T4-Pelvis fusion w/ T9-10 corpectomies) **AIRO (N/A)   9 Days Post-Op     Interval History: Paracentesis yesterday. Exam stable. Per plastics- keep all drains until discharge     Medications:  Continuous Infusions:  Scheduled Meds:   acetaminophen  1,000 mg Oral Q12H    bisacodyL  10 mg Rectal Every other day    buPROPion  300 mg Oral Daily    dronabinoL  2.5 mg Oral BID    furosemide (LASIX) injection  60 mg Intravenous Daily    gabapentin  900 mg Oral TID    heparin (porcine)  5,000 Units Subcutaneous Q8H    lactulose  20 g Oral TID    meropenem (MERREM) IVPB  2 g Intravenous Q8H    methocarbamoL  1,000 mg Oral Q6H    pantoprazole  40 mg Oral Daily    sodium chloride 0.9%  10 mL Intravenous Q6H    spironolactone  25 mg Oral Daily     PRN Meds:sodium chloride, sodium chloride, dextrose 10%, dextrose 10%, glucagon (human recombinant), glucose, glucose, hydrALAZINE, HYDROmorphone, magnesium hydroxide 400 mg/5 ml, melatonin, naloxone, ondansetron, oxyCODONE **AND** oxyCODONE, polyethylene glycol, prochlorperazine, senna-docusate 8.6-50 mg, simethicone, sodium chloride 0.9%, Flushing PICC Protocol **AND** sodium chloride 0.9% **AND** sodium chloride 0.9%     Review of Systems  Objective:     Weight: (!) 140.5 kg (309 lb 11.9 oz)  Body mass index is 48.51 kg/m².  Vital Signs (Most Recent):  Temp: 98.3 °F (36.8 °C) (02/11/23 0739)  Pulse: (!) 113 (02/11/23 0739)  Resp: 16 (02/11/23 0847)  BP: (!) 114/55 (02/11/23 0739)  SpO2: 97 % (02/11/23 0739)   Vital Signs (24h Range):  Temp:  [97.5 °F (36.4 °C)-98.3 °F (36.8 °C)] 98.3 °F (36.8 °C)  Pulse:  [104-113] 113  Resp:  [16-19] 16  SpO2:  [93 %-98 %] 97 %  BP: ()/(50-64) 114/55     Date 02/11/23 0700 - 02/12/23 0659   Shift 8140-5530 1402-0966 0093-2941 24 Hour Total   INTAKE   P.O. 400   400    Shift Total(mL/kg) 400(2.8)   400(2.8)   OUTPUT   Drains 80   80   Shift Total(mL/kg) 80(0.6)   80(0.6)   Weight (kg) 140.5 140.5 140.5 140.5                        Closed/Suction Drain 02/02/23 1401 Right Back Bulb 15 Fr. (Active)   Site Description Other (Comment) 02/10/23 2000   Dressing Type Other (Comment) 02/10/23 2000   Dressing Status Clean;Dry;Intact 02/10/23 0752   Dressing Intervention Integrity maintained 02/10/23 0752   Drainage Serosanguineous 02/10/23 2000   Status Other (Comment) 02/10/23 2000   Output (mL) 80 mL 02/11/23 1038            Closed/Suction Drain 02/02/23 1402 Right Back Bulb 15 Fr. (Active)   Site Description Other (Comment) 02/10/23 2000   Dressing Type Transparent (Tegaderm) 02/10/23 0752   Dressing Status Dry;Clean;Intact 02/10/23 0752   Dressing Intervention Integrity maintained 02/10/23 0752   Drainage Serosanguineous 02/10/23 2000   Status To bulb suction 02/10/23 0752   Output (mL) 0 mL 02/11/23 0526            Closed/Suction Drain 02/02/23 1402 Left Back Bulb 15 Fr. (Active)   Site Description Other (Comment) 02/10/23 2000   Dressing Type Transparent (Tegaderm) 02/10/23 0752   Dressing Status Clean;Intact 02/10/23 0752   Dressing Intervention Integrity maintained 02/10/23 0752   Drainage Serosanguineous 02/10/23 2000   Status To bulb suction 02/10/23 0752   Output (mL) 50 mL 02/11/23 0526            Closed/Suction Drain 02/02/23 1402 Left Back Bulb 15 Fr. (Active)   Site Description Other (Comment) 02/10/23 2000   Dressing Type Transparent (Tegaderm) 02/10/23 0752   Dressing Status Intact;Dry;Clean 02/10/23 0752   Dressing Intervention Integrity maintained 02/10/23 0752   Drainage Serosanguineous 02/10/23 2000   Status Open to gravity drainage 02/10/23 0752   Output (mL) 0 mL 02/11/23 0526       Female External Urinary Catheter 02/10/23 2323 (Active)   Output (mL) 500 mL 02/11/23 0527       Neurosurgery Physical Exam  General: well developed, well nourished, no distress.    Head: normocephalic, atraumatic  Neurologic: Alert and oriented. Thought content appropriate.  GCS: Motor: 6/Verbal: 5/Eyes: 4 GCS Total: 15  Mental Status: Awake, Alert, Oriented x 4  Language: No aphasia  Speech: No dysarthria  Cranial nerves: face symmetric, tongue midline, CN II-XII grossly intact.   Eyes: pupils equal, round, reactive to light with accommodation, EOMI. Dysconjugate gaze.  Pulmonary: normal respirations, no signs of respiratory distress  Skin: Skin is warm, dry and intact.  Sensory: intact to light touch throughout  Motor Strength: Moves all extremities spontaneously.     Strength   Deltoids Triceps Biceps Wrist Extension Wrist Flexion Hand    Upper: R 5/5 5/5 5/5 5/5 5/5 5/5     L 5/5 5/5 5/5 5/5 5/5 5/5       Iliopsoas Quadriceps Knee  Flexion Tibialis  anterior Gastro- cnemius EHL   Lower: R 2/5 3/5 3/5 4+/5 4+/5 4+/5     L 2/5 3/5 3/5 4+/5 4+/5 4+/5      BLE strength/ROM limited due to pain, body habitus and severe bilateral leg edema.     Thoracolumbar incision: C/D/I with staples. Skin edges well approximated. No surrounding erythema or edema. No drainage or TTP.      MIHIR drains x 4 intact to gravity with ss output.    Significant Labs:  Recent Labs   Lab 02/10/23  0236 02/10/23  0631 02/10/23  1751 02/11/23  0430   *  136* 135* 109 108   *  135* 127* 128* 126*   K 3.5  3.6 4.0 4.1 3.7     101 98 97 97   CO2 23  22* 22* 23 23   BUN 23*  23* 23* 25* 24*   CREATININE 0.5  0.6 0.6 0.6 0.6   CALCIUM 6.9*  6.9* 7.6* 7.6* 7.7*   MG 1.8  --   --  2.1     Recent Labs   Lab 02/10/23  0236 02/11/23  0430   WBC 5.53 4.34   HGB 7.4* 7.4*   HCT 24.4* 24.1*   * 102*     No results for input(s): LABPT, INR, APTT in the last 48 hours.  Microbiology Results (last 7 days)       Procedure Component Value Units Date/Time    AFB Culture & Smear [[id number]]     Order Status: Completed Specimen: Ascites     Gram stain [[id number]] Collected: 02/10/23 1430    Order Status:  Completed Specimen: Ascites Updated: 02/11/23 0333     Gram Stain Result No WBC's      No organisms seen    Aerobic culture [[id number]] Collected: 02/10/23 1430    Order Status: Sent Specimen: Ascites Updated: 02/10/23 1753    Culture, Anaerobic [[id number]] Collected: 02/10/23 1430    Order Status: Sent Specimen: Ascites Updated: 02/10/23 1753    Fungus culture [[id number]] Collected: 01/24/23 0913    Order Status: Completed Specimen: Body Fluid from Back Updated: 02/08/23 0655     Fungus (Mycology) Culture Culture in progress      No fungus isolated after 2 weeks    Narrative:      1) Perispinal    Fungus culture [[id number]] Collected: 01/24/23 0943    Order Status: Completed Specimen: Bone from Back Updated: 02/08/23 0655     Fungus (Mycology) Culture Culture in progress      No fungus isolated after 2 weeks    Narrative:      3) Perispinal    Fungus culture [[id number]] Collected: 01/24/23 0943    Order Status: Completed Specimen: Bone from Back Updated: 02/08/23 0655     Fungus (Mycology) Culture Culture in progress      No fungus isolated after 2 weeks    Narrative:      4) Perispinal    Fungus culture [[id number]] Collected: 01/24/23 0913    Order Status: Completed Specimen: Body Fluid from Back Updated: 02/08/23 0655     Fungus (Mycology) Culture Culture in progress      No fungus isolated after 2 weeks    Narrative:      2) Perispinal          All pertinent labs from the last 24 hours have been reviewed.    Significant Diagnostics:  I have reviewed all pertinent imaging results/findings within the past 24 hours.    Assessment/Plan:     * Weakness of both lower extremities  Pt is 42F multiple spinal surgeries and revisions last T10- Pelvis in March 2022 who presented to OSH with concern for shoulder infection and UTI found to have E coli sepsis who has had progressive worsening BLE weakness. 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 Transferred to C to  and neurosurgery was  consulted.    OR 1/24 for temporary T6-12 PSIF. Taz pus noted intraoperatively.   OR 2/2 for T4-pelvis revision and extension of fusion with T9-10 corpectomy with plastic surgery assistance.  Maps goals > 85 completed 2/5 in ICU.    Plan:  --Stepped down to  floor.   -q4h neurochecks.  --All labs & diagnostics reviewed.   -post op xrays pending. Obtain upright when pt able to stand or sit, prior to discharge.  --TLSO to be worn when OOB only.  --MIHIR drains x4 to gravity, monitor output qshift. Per plastics - keep all drains until discharge -- management of drains per plastics   --Plastic surgery managing incision. Open to air.   - Off-load pressure as much as possible to promote wound healing.   - Elevate with wedge, alternate sides Q2H  --Thoracic discitis: BCx 1/20 no growth. OR cultures 1/24 grew ESBL E. coli   -- ID consulted. Recs for IV meropenem with estimated end date of 3/29/23.  --Pain Control: Continue multimodal regimen. PCA pump discontinued 2/8 with poor pain control subsequently.    - Anesthesia Pain Service consulted due to refractory pain, appreciate assistance.   - No NSAIDs as pt s/p recent instrumented fusion.  --DVT PPx: SQH/TEDs/SCDs.  --NSGY will continue to follow. Please contact team with any further questions.        Susannah Mackenzie PA-C  Neurosurgery  [facility] - Telemetry Stepdown

## 2023-02-11 NOTE — PLAN OF CARE
Problem: Adult Inpatient Plan of Care  Goal: Plan of Care Review  Outcome: Ongoing, Progressing  Goal: Patient-Specific Goal (Individualized)  Outcome: Ongoing, Progressing  Goal: Absence of Hospital-Acquired Illness or Injury  Outcome: Ongoing, Progressing  Goal: Optimal Comfort and Wellbeing  Outcome: Ongoing, Progressing  Goal: Readiness for Transition of Care  Outcome: Ongoing, Progressing     Problem: Adjustment to Illness (Sepsis/Septic Shock)  Goal: Optimal Coping  Outcome: Ongoing, Progressing     Problem: Bleeding (Sepsis/Septic Shock)  Goal: Absence of Bleeding  Outcome: Ongoing, Progressing     Problem: Glycemic Control Impaired (Sepsis/Septic Shock)  Goal: Blood Glucose Level Within Desired Range  Outcome: Ongoing, Progressing     Problem: Infection Progression (Sepsis/Septic Shock)  Goal: Absence of Infection Signs and Symptoms  Outcome: Ongoing, Progressing     Problem: Infection  Goal: Absence of Infection Signs and Symptoms  Outcome: Ongoing, Progressing     Problem: Skin Injury Risk Increased  Goal: Skin Health and Integrity  Outcome: Ongoing, Progressing     Problem: Fall Injury Risk  Goal: Absence of Fall and Fall-Related Injury  Outcome: Ongoing, Progressing     Problem: Adjustment to Illness (Stroke, Hemorrhagic)  Goal: Optimal Coping  Outcome: Ongoing, Progressing     Problem: Bowel Elimination Impaired (Stroke, Hemorrhagic)  Goal: Effective Bowel Elimination  Outcome: Ongoing, Progressing     Problem: Cerebral Tissue Perfusion (Stroke, Hemorrhagic)  Goal: Optimal Cerebral Tissue Perfusion  Outcome: Ongoing, Progressing     Problem: Cognitive Impairment (Stroke, Hemorrhagic)  Goal: Optimal Cognitive Function  Outcome: Ongoing, Progressing     Problem: Communication Impairment (Stroke, Hemorrhagic)  Goal: Effective Communication Skills  Outcome: Ongoing, Progressing     Problem: Functional Ability Impaired (Stroke, Hemorrhagic)  Goal: Optimal Functional Ability  Outcome: Ongoing, Progressing      Problem: Pain (Stroke, Hemorrhagic)  Goal: Acceptable Pain Control  Outcome: Ongoing, Progressing     Problem: Respiratory Compromise (Stroke, Hemorrhagic)  Goal: Effective Oxygenation and Ventilation  Outcome: Ongoing, Progressing     Problem: Sensorimotor Impairment (Stroke, Hemorrhagic)  Goal: Improved Sensorimotor Function  Outcome: Ongoing, Progressing     Problem: Swallowing Impairment (Stroke, Hemorrhagic)  Goal: Optimal Eating and Swallowing Without Aspiration  Outcome: Ongoing, Progressing     Problem: Urinary Elimination Impaired (Stroke, Hemorrhagic)  Goal: Effective Urinary Elimination  Outcome: Ongoing, Progressing     Problem: Bariatric Environmental Safety  Goal: Safety Maintained with Care  Outcome: Ongoing, Progressing

## 2023-02-11 NOTE — PROGRESS NOTES
"[facility] - Telemetry Stepdown  Nephrology  Progress Note    Patient Name: Rachel Zazueta  MRN: [id number]  Admission Date: 1/19/2023  Hospital Length of Stay: 23 days  Attending Provider: Vincent Bal MD   Primary Care Physician: Dannielle Merino DO  Principal Problem:Weakness of both lower extremities    Subjective:     HPI: Per HM note: "42 y.o F w/ hx ofIV drug use (methamphetamine), chronic HCV with cirrhosis, spinal fusion (04/2021), epidural abscess with removal of hardware (02/2022), spinal fusion with hardware (T10 - Pelvis / 03/2022), obesity (BMI 39.3) and neurogenic bladder and recent shoulder surgery who initially presented to [facility] for evaluation of back pain and left leg weakness. She reports initially noting the left leg weakness when she was about to go to the bathroom and was about to fall but was assisted by her boyfriend.  She was brought to the ED via EMS. Urinalysis with UTI concerns and blood cultures at OSH grew ESBL E coli, and shoulder effusion concern for infection so she was treated with meropenem.  During hospitalization, she reports the weakness that started out in her left leg initially then spread upwards to her left thigh, left hip, then crossed over to the right hip and spread to her right leg.  Denies recent IV drug use. She reports her last incidence of drug use was more than 3 years ago and also denies tobacco, and alcohol abuse.  Reports cannabis use.     OSH course notable for concerning urinalysis and blood cultures positive for ESBL E coli treated with meropenem.  She was also admit to the ICU where she was treated with Precedex for significant body aches, irritability, and muscle spasms.  Concerns of a murmur on physical exam so she underwent TTE which did not show any vegetations.  Worsening lower extremity weakness workup with MRI head nonspecific, MRI cervical spine with multilevel spondylosis, X-ray spine with multilevel thoracolumbar fusion and " "diskectomy, focal kyphosis at T9-10 increased compared to prior.  She was started on prophylaxis amoxicillin due to her history of infected hardware.  MRI spine unable to be completed due to patient's body habitus so she was transferred to [facility] for further imaging and Neurosurgery, Neurology evaluation." Underwent laminectomy, posterior fusion and washout on 1/24. ID following for discitis, has her on meropenem. Stepped down to  on 2/8.     Nephrology consulted for hyponatremia. Patient reports uncomfortable abdominal distension and leg swelling. Reports drinking 3-4 cups of her 30oz water tumbler daily. Urine Na <10, urine osm 513. Serum Na trend 136--> 132-->131-->129-->127-->135-->134-->127. Normal mentation. Cr/BUN wnl. Albumin 1.8, protein 4.5, bili 1.4.          Interval History: Na 126 with repeat stable at 128. Encouraged maintaining 1 L fluid restriction. Minimal UOP on Lasix 60 qd. Edema on B/L LE and distended abdomen. Para yesterday with removal of 5L. Cr 0.6.     Review of patient's allergies indicates:   Allergen Reactions    Bee venom protein (honey bee) Anaphylaxis     Patient reports she is allergic to bee stings.    Naproxen Anaphylaxis     Throat closing    12-23- Patient reports taking Ibuprofen 200 mg at home without problems. Verified X3. [place]    Wasp sting [allergen ext-venom-honey bee] Anaphylaxis    Adhesive Blisters    Iodine and iodide containing products Hives     Allergic to iodine in seafood only    Shellfish containing products     Nuts [tree nut] Rash     Current Facility-Administered Medications   Medication Frequency    0.9%  NaCl infusion (for blood administration) Q24H PRN    0.9%  NaCl infusion (for blood administration) Q24H PRN    acetaminophen tablet 1,000 mg Q12H    bisacodyL suppository 10 mg Every other day    buPROPion TB24 tablet 300 mg Daily    dextrose 10% bolus 125 mL 125 mL PRN    dextrose 10% bolus 250 mL 250 mL PRN    dronabinoL capsule 2.5 mg BID    " furosemide injection 60 mg Daily    gabapentin capsule 900 mg TID    glucagon (human recombinant) injection 1 mg PRN    glucose chewable tablet 16 g PRN    glucose chewable tablet 24 g PRN    heparin (porcine) injection 5,000 Units Q8H    hydrALAZINE tablet 25 mg Q8H PRN    HYDROmorphone injection 0.5 mg Q4H PRN    lactulose 20 gram/30 mL solution Soln 20 g TID    magnesium hydroxide 400 mg/5 ml suspension 2,400 mg Daily PRN    melatonin tablet 6 mg Nightly PRN    meropenem (MERREM) 2 g in sodium chloride 0.9% 100 mL IVPB Q8H    methocarbamoL tablet 1,000 mg Q6H    naloxone 0.4 mg/mL injection 0.02 mg PRN    ondansetron injection 4 mg Q6H PRN    oxyCODONE immediate release tablet 5 mg Q3H PRN    And    oxyCODONE immediate release tablet Tab 10 mg Q3H PRN    pantoprazole EC tablet 40 mg Daily    polyethylene glycol packet 17 g Daily PRN    prochlorperazine injection Soln 2.5 mg Q6H PRN    senna-docusate 8.6-50 mg per tablet 1 tablet BID PRN    simethicone chewable tablet 80 mg TID PRN    sodium chloride 0.9% flush 10 mL Q12H PRN    sodium chloride 0.9% flush 10 mL Q6H    And    sodium chloride 0.9% flush 10 mL PRN    spironolactone tablet 25 mg Daily       Objective:     Vital Signs (Most Recent):  Temp: 97.9 °F (36.6 °C) (02/11/23 1129)  Pulse: 100 (02/11/23 1430)  Resp: 18 (02/11/23 1430)  BP: (!) 96/51 (02/11/23 1129)  SpO2: 96 % (02/11/23 1430)   Vital Signs (24h Range):  Temp:  [97.9 °F (36.6 °C)-98.3 °F (36.8 °C)] 97.9 °F (36.6 °C)  Pulse:  [100-113] 100  Resp:  [16-19] 18  SpO2:  [93 %-98 %] 96 %  BP: ()/(50-64) 96/51     Weight: (!) 140.5 kg (309 lb 11.9 oz) (02/10/23 0600)  Body mass index is 48.51 kg/m².  Body surface area is 2.58 meters squared.    I/O last 3 completed shifts:  In: 2323.2 [P.O.:1340; IV Piggyback:983.2]  Out: 6895 [Urine:1180; Drains:715; Other:5000]    Physical Exam  Constitutional:       General: She is not in acute distress.     Appearance: Normal  appearance. She is obese. She is not ill-appearing.   HENT:      Head: Normocephalic and atraumatic.   Eyes:      General: No scleral icterus.  Cardiovascular:      Rate and Rhythm: Normal rate.      Pulses: Normal pulses.   Pulmonary:      Effort: Pulmonary effort is normal.   Abdominal:      General: There is distension.      Tenderness: There is abdominal tenderness. There is no guarding.   Musculoskeletal:      Right lower leg: Edema present.      Left lower leg: Edema present.   Skin:     General: Skin is warm and dry.      Coloration: Skin is not jaundiced.   Neurological:      Mental Status: She is alert and oriented to person, place, and time.   Psychiatric:         Behavior: Behavior normal.       Significant Labs:  CBC:   Recent Labs   Lab 02/11/23  0430   WBC 4.34   RBC 2.58*   HGB 7.4*   HCT 24.1*   *   MCV 93   MCH 28.7   MCHC 30.7*     CMP:   Recent Labs   Lab 02/11/23  0430 02/11/23  1144    112*   CALCIUM 7.7* 7.4*   ALBUMIN 2.1* 2.0*   PROT 5.0*  --    * 128*   K 3.7 3.7   CO2 23 26   CL 97 97   BUN 24* 24*   CREATININE 0.6 0.5   ALKPHOS 141*  --    ALT 24  --    AST 35  --    BILITOT 1.4*  --      All labs within the past 24 hours have been reviewed.     Significant Imaging:  Labs: Reviewed      Assessment/Plan:     Hyponatremia  42 y.o F w/ hx of IV drug use (meth), chronic HCV with cirrhosis, spinal fusion (04/2021), epidural abscess with removal of hardware (02/2022), spinal fusion with hardware (T10 - Pelvis / 03/2022), and neurogenic bladder here with complicated ESBL E.coli epidural abscess s/p washout,corpectomy, fixation being treated w/ meropenem.     Nephrology consulted for hyponatremia. Patient reports uncomfortable abdominal distension and leg swelling. Reports drinking 3-4 cups of her 30oz water tumbler daily. Urine Na <10, urine osm 513. Serum osm 283. Serum Na trend 136--> 132-->131-->129-->127-->135-->134-->127. Normal mentation. Cr/BUN wnl. Albumin 1.8, protein  4.5, bili 1.4.     Hyponatremia likely 2/2 to cirrhosis and hypervolemia. Possible poor solute intake relative to free water intake. Unlikely SIADH given low urine Na.     - Fluid restrict to 1 L daily. Patient currently not compliant w/ fluid restriction despite counseling.   - Increase Lasix IV 80 BID d/t minimal UOP, continue spironolactone 25mg daily  - Asymptomatic, no role for hypertonic saline  - Goal increase sodium 3-6 mM/24h  - q12h Na check      Chronic hepatitis C with cirrhosis  Will need to f/u with outpatient hepatology for antiviral treatment    Cirrhosis of liver with ascites  S/p para on 2/10  On lactulose, lasix and aldactone  Rest of management per primary        Thank you for your consult. I will follow-up with patient. Please contact us if you have any additional questions.    Sagar Lamas, DO  Nephrology  [facility] - Telemetry Stepdown

## 2023-02-11 NOTE — ASSESSMENT & PLAN NOTE
42 y.o F w/ hx of IV drug use (meth), chronic HCV with cirrhosis, spinal fusion (04/2021), epidural abscess with removal of hardware (02/2022), spinal fusion with hardware (T10 - Pelvis / 03/2022), and neurogenic bladder here with complicated ESBL E.coli epidural abscess s/p washout,corpectomy, fixation being treated w/ meropenem.     Nephrology consulted for hyponatremia. Patient reports uncomfortable abdominal distension and leg swelling. Reports drinking 3-4 cups of her 30oz water tumbler daily. Urine Na <10, urine osm 513. Serum osm 283. Serum Na trend 136--> 132-->131-->129-->127-->135-->134-->127. Normal mentation. Cr/BUN wnl. Albumin 1.8, protein 4.5, bili 1.4.     Hyponatremia likely 2/2 to cirrhosis and hypervolemia. Possible poor solute intake relative to free water intake. Unlikely SIADH given low urine Na.     - Fluid restrict to 1 L daily. Patient currently not compliant w/ fluid restriction despite counseling.   - Increase Lasix IV 80 BID d/t minimal UOP, continue spironolactone 25mg daily  - Asymptomatic, no role for hypertonic saline  - Goal increase sodium 3-6 mM/24h  - q12h Na check

## 2023-02-11 NOTE — PLAN OF CARE
Problem: Adult Inpatient Plan of Care  Goal: Plan of Care Review  Outcome: Ongoing, Progressing  Goal: Optimal Comfort and Wellbeing  Outcome: Ongoing, Progressing  Goal: Readiness for Transition of Care  Outcome: Ongoing, Progressing     Problem: Fall Injury Risk  Goal: Absence of Fall and Fall-Related Injury  Outcome: Ongoing, Progressing        Report received from off going nurse. Call light and personal belongings within reach

## 2023-02-11 NOTE — SUBJECTIVE & OBJECTIVE
Interval History: Paracentesis yesterday. Exam stable. Per plastics- keep all drains until discharge     Medications:  Continuous Infusions:  Scheduled Meds:   acetaminophen  1,000 mg Oral Q12H    bisacodyL  10 mg Rectal Every other day    buPROPion  300 mg Oral Daily    dronabinoL  2.5 mg Oral BID    furosemide (LASIX) injection  60 mg Intravenous Daily    gabapentin  900 mg Oral TID    heparin (porcine)  5,000 Units Subcutaneous Q8H    lactulose  20 g Oral TID    meropenem (MERREM) IVPB  2 g Intravenous Q8H    methocarbamoL  1,000 mg Oral Q6H    pantoprazole  40 mg Oral Daily    sodium chloride 0.9%  10 mL Intravenous Q6H    spironolactone  25 mg Oral Daily     PRN Meds:sodium chloride, sodium chloride, dextrose 10%, dextrose 10%, glucagon (human recombinant), glucose, glucose, hydrALAZINE, HYDROmorphone, magnesium hydroxide 400 mg/5 ml, melatonin, naloxone, ondansetron, oxyCODONE **AND** oxyCODONE, polyethylene glycol, prochlorperazine, senna-docusate 8.6-50 mg, simethicone, sodium chloride 0.9%, Flushing PICC Protocol **AND** sodium chloride 0.9% **AND** sodium chloride 0.9%     Review of Systems  Objective:     Weight: (!) 140.5 kg (309 lb 11.9 oz)  Body mass index is 48.51 kg/m².  Vital Signs (Most Recent):  Temp: 98.3 °F (36.8 °C) (02/11/23 0739)  Pulse: (!) 113 (02/11/23 0739)  Resp: 16 (02/11/23 0847)  BP: (!) 114/55 (02/11/23 0739)  SpO2: 97 % (02/11/23 0739)   Vital Signs (24h Range):  Temp:  [97.5 °F (36.4 °C)-98.3 °F (36.8 °C)] 98.3 °F (36.8 °C)  Pulse:  [104-113] 113  Resp:  [16-19] 16  SpO2:  [93 %-98 %] 97 %  BP: ()/(50-64) 114/55     Date 02/11/23 0700 - 02/12/23 0659   Shift 6769-8474 7649-6628 4924-4111 24 Hour Total   INTAKE   P.O. 400   400   Shift Total(mL/kg) 400(2.8)   400(2.8)   OUTPUT   Drains 80   80   Shift Total(mL/kg) 80(0.6)   80(0.6)   Weight (kg) 140.5 140.5 140.5 140.5                        Closed/Suction Drain 02/02/23 1401 Right Back Bulb 15 Fr. (Active)   Site Description  Other (Comment) 02/10/23 2000   Dressing Type Other (Comment) 02/10/23 2000   Dressing Status Clean;Dry;Intact 02/10/23 0752   Dressing Intervention Integrity maintained 02/10/23 0752   Drainage Serosanguineous 02/10/23 2000   Status Other (Comment) 02/10/23 2000   Output (mL) 80 mL 02/11/23 1038            Closed/Suction Drain 02/02/23 1402 Right Back Bulb 15 Fr. (Active)   Site Description Other (Comment) 02/10/23 2000   Dressing Type Transparent (Tegaderm) 02/10/23 0752   Dressing Status Dry;Clean;Intact 02/10/23 0752   Dressing Intervention Integrity maintained 02/10/23 0752   Drainage Serosanguineous 02/10/23 2000   Status To bulb suction 02/10/23 0752   Output (mL) 0 mL 02/11/23 0526            Closed/Suction Drain 02/02/23 1402 Left Back Bulb 15 Fr. (Active)   Site Description Other (Comment) 02/10/23 2000   Dressing Type Transparent (Tegaderm) 02/10/23 0752   Dressing Status Clean;Intact 02/10/23 0752   Dressing Intervention Integrity maintained 02/10/23 0752   Drainage Serosanguineous 02/10/23 2000   Status To bulb suction 02/10/23 0752   Output (mL) 50 mL 02/11/23 0526            Closed/Suction Drain 02/02/23 1402 Left Back Bulb 15 Fr. (Active)   Site Description Other (Comment) 02/10/23 2000   Dressing Type Transparent (Tegaderm) 02/10/23 0752   Dressing Status Intact;Dry;Clean 02/10/23 0752   Dressing Intervention Integrity maintained 02/10/23 0752   Drainage Serosanguineous 02/10/23 2000   Status Open to gravity drainage 02/10/23 0752   Output (mL) 0 mL 02/11/23 0526       Female External Urinary Catheter 02/10/23 2323 (Active)   Output (mL) 500 mL 02/11/23 0527       Neurosurgery Physical Exam  General: well developed, well nourished, no distress.   Head: normocephalic, atraumatic  Neurologic: Alert and oriented. Thought content appropriate.  GCS: Motor: 6/Verbal: 5/Eyes: 4 GCS Total: 15  Mental Status: Awake, Alert, Oriented x 4  Language: No aphasia  Speech: No dysarthria  Cranial nerves: face  symmetric, tongue midline, CN II-XII grossly intact.   Eyes: pupils equal, round, reactive to light with accommodation, EOMI. Dysconjugate gaze.  Pulmonary: normal respirations, no signs of respiratory distress  Skin: Skin is warm, dry and intact.  Sensory: intact to light touch throughout  Motor Strength: Moves all extremities spontaneously.     Strength   Deltoids Triceps Biceps Wrist Extension Wrist Flexion Hand    Upper: R 5/5 5/5 5/5 5/5 5/5 5/5     L 5/5 5/5 5/5 5/5 5/5 5/5       Iliopsoas Quadriceps Knee  Flexion Tibialis  anterior Gastro- cnemius EHL   Lower: R 2/5 3/5 3/5 4+/5 4+/5 4+/5     L 2/5 3/5 3/5 4+/5 4+/5 4+/5      BLE strength/ROM limited due to pain, body habitus and severe bilateral leg edema.     Thoracolumbar incision: C/D/I with staples. Skin edges well approximated. No surrounding erythema or edema. No drainage or TTP.      MIHIR drains x 4 intact to gravity with ss output.    Significant Labs:  Recent Labs   Lab 02/10/23  0236 02/10/23  0631 02/10/23  1751 02/11/23  0430   *  136* 135* 109 108   *  135* 127* 128* 126*   K 3.5  3.6 4.0 4.1 3.7     101 98 97 97   CO2 23  22* 22* 23 23   BUN 23*  23* 23* 25* 24*   CREATININE 0.5  0.6 0.6 0.6 0.6   CALCIUM 6.9*  6.9* 7.6* 7.6* 7.7*   MG 1.8  --   --  2.1     Recent Labs   Lab 02/10/23  0236 02/11/23  0430   WBC 5.53 4.34   HGB 7.4* 7.4*   HCT 24.4* 24.1*   * 102*     No results for input(s): LABPT, INR, APTT in the last 48 hours.  Microbiology Results (last 7 days)       Procedure Component Value Units Date/Time    AFB Culture & Smear [[id number]]     Order Status: Completed Specimen: Ascites     Gram stain [[id number]] Collected: 02/10/23 1430    Order Status: Completed Specimen: Ascites Updated: 02/11/23 0333     Gram Stain Result No WBC's      No organisms seen    Aerobic culture [[id number]] Collected: 02/10/23 1430    Order Status: Sent Specimen: Ascites Updated: 02/10/23 1753    Culture, Anaerobic  [[id number]] Collected: 02/10/23 1430    Order Status: Sent Specimen: Ascites Updated: 02/10/23 1753    Fungus culture [[id number]] Collected: 01/24/23 0913    Order Status: Completed Specimen: Body Fluid from Back Updated: 02/08/23 0655     Fungus (Mycology) Culture Culture in progress      No fungus isolated after 2 weeks    Narrative:      1) Perispinal    Fungus culture [[id number]] Collected: 01/24/23 0943    Order Status: Completed Specimen: Bone from Back Updated: 02/08/23 0655     Fungus (Mycology) Culture Culture in progress      No fungus isolated after 2 weeks    Narrative:      3) Perispinal    Fungus culture [[id number]] Collected: 01/24/23 0943    Order Status: Completed Specimen: Bone from Back Updated: 02/08/23 0655     Fungus (Mycology) Culture Culture in progress      No fungus isolated after 2 weeks    Narrative:      4) Perispinal    Fungus culture [[id number]] Collected: 01/24/23 0913    Order Status: Completed Specimen: Body Fluid from Back Updated: 02/08/23 0655     Fungus (Mycology) Culture Culture in progress      No fungus isolated after 2 weeks    Narrative:      2) Perispinal          All pertinent labs from the last 24 hours have been reviewed.    Significant Diagnostics:  I have reviewed all pertinent imaging results/findings within the past 24 hours.

## 2023-02-11 NOTE — PROGRESS NOTES
Plastic and Reconstructive Surgery   Progress Note    Subjective:    No acute events, afebrile. Not on pressors.   Drains are still very high output. SS.      Objective:  Vital signs in last 24 hours:  Temp:  [97.5 °F (36.4 °C)-98.3 °F (36.8 °C)] 98.3 °F (36.8 °C)  Pulse:  [104-113] 113  Resp:  [18-19] 19  SpO2:  [93 %-98 %] 97 %  BP: ()/(50-64) 114/55    Intake/Output last 3 shifts:  I/O last 3 completed shifts:  In: 2323.2 [P.O.:1340; IV Piggyback:983.2]  Out: 6895 [Urine:1180; Drains:715; Other:5000]    Intake/Output this shift:  No intake/output data recorded.        Physical Exam:  VITAL SIGNS:   Vitals:    23 0423 23 0521 23 0646 23 0739   BP: 113/64   (!) 114/55   BP Location:    Right arm   Patient Position:    Lying   Pulse: (!) 111  (!) 112 (!) 113   Resp:  18  19   Temp: 98.2 °F (36.8 °C)   98.3 °F (36.8 °C)   TempSrc:    Oral   SpO2: 98%   97%   Weight:       Height:         TMAX: Temp (24hrs), Av °F (36.7 °C), Min:97.5 °F (36.4 °C), Max:98.3 °F (36.8 °C)    General: Alert; No acute distress  Cardiovascular: Regular rate   Respiratory: Normal respiratory effort. Chest rise symmetric.   Abdomen: Soft, nontender, nondistended  Extremity: Moves all extremities equally.  Neurologic: No focal deficit. Speech normal  Spine incision is intact. Staples still in place.no signs of infection      Scheduled Medications acetaminophen, 1,000 mg, Q12H  bisacodyL, 10 mg, Every other day  buPROPion, 300 mg, Daily  dronabinoL, 2.5 mg, BID  furosemide (LASIX) injection, 60 mg, Daily  gabapentin, 900 mg, TID  heparin (porcine), 5,000 Units, Q8H  lactulose, 20 g, TID  meropenem (MERREM) IVPB, 2 g, Q8H  methocarbamoL, 1,000 mg, Q6H  pantoprazole, 40 mg, Daily  sodium chloride 0.9%, 10 mL, Q6H  spironolactone, 25 mg, Daily      PRN Medications sodium chloride, sodium chloride, dextrose 10%, dextrose 10%, glucagon (human recombinant), glucose, glucose, hydrALAZINE, HYDROmorphone, magnesium  hydroxide 400 mg/5 ml, melatonin, naloxone, ondansetron, oxyCODONE **AND** oxyCODONE, polyethylene glycol, prochlorperazine, senna-docusate 8.6-50 mg, simethicone, sodium chloride 0.9%, Flushing PICC Protocol **AND** sodium chloride 0.9% **AND** sodium chloride 0.9%    Recent Labs:   Lab Results   Component Value Date    WBC 4.34 02/11/2023    HGB 7.4 (L) 02/11/2023    HCT 24.1 (L) 02/11/2023    MCV 93 02/11/2023     (L) 02/11/2023     Lab Results   Component Value Date     02/11/2023     (L) 02/11/2023    K 3.7 02/11/2023    CL 97 02/11/2023    BUN 24 (H) 02/11/2023         Assessment: 42 y.o. y/o female 9 Days Post-Op s/p Procedure(s):  LAMINECTOMY, SPINE, THORACIC, WITH FUSION (T4-Pelvis fusion w/ T9-10 corpectomies) **AIRO Doing well postoperatively.  Drains with SSO , still with high volume    Incision remains intact this AM, keep staples in place     Plan  -Continue to monitor output  -keep MIHIR drains in place, will remain in place upon discharge  -plastics will follow peripherally   -Rest of care per primary

## 2023-02-11 NOTE — CARE UPDATE
RAPID RESPONSE NURSE ROUND       Rounding completed with charge RNAndreea for soft BP.  No additional concerns verbalized at this time. Instructed to call [phone] for further concerns or assistance.

## 2023-02-12 LAB
ALBUMIN SERPL BCP-MCNC: 1.9 G/DL (ref 3.5–5.2)
ALBUMIN SERPL BCP-MCNC: 2 G/DL (ref 3.5–5.2)
ALP SERPL-CCNC: 141 U/L (ref 55–135)
ALT SERPL W/O P-5'-P-CCNC: 23 U/L (ref 10–44)
ANION GAP SERPL CALC-SCNC: 8 MMOL/L (ref 8–16)
ANION GAP SERPL CALC-SCNC: 9 MMOL/L (ref 8–16)
AST SERPL-CCNC: 38 U/L (ref 10–40)
BASOPHILS # BLD AUTO: 0.06 K/UL (ref 0–0.2)
BASOPHILS NFR BLD: 1.2 % (ref 0–1.9)
BILIRUB SERPL-MCNC: 1.2 MG/DL (ref 0.1–1)
BUN SERPL-MCNC: 27 MG/DL (ref 6–20)
BUN SERPL-MCNC: 27 MG/DL (ref 6–20)
CALCIUM SERPL-MCNC: 7.1 MG/DL (ref 8.7–10.5)
CALCIUM SERPL-MCNC: 7.2 MG/DL (ref 8.7–10.5)
CHLORIDE SERPL-SCNC: 100 MMOL/L (ref 95–110)
CHLORIDE SERPL-SCNC: 99 MMOL/L (ref 95–110)
CO2 SERPL-SCNC: 21 MMOL/L (ref 23–29)
CO2 SERPL-SCNC: 21 MMOL/L (ref 23–29)
CREAT SERPL-MCNC: 0.5 MG/DL (ref 0.5–1.4)
CREAT SERPL-MCNC: 0.5 MG/DL (ref 0.5–1.4)
DIFFERENTIAL METHOD: ABNORMAL
EOSINOPHIL # BLD AUTO: 0.2 K/UL (ref 0–0.5)
EOSINOPHIL NFR BLD: 4.4 % (ref 0–8)
ERYTHROCYTE [DISTWIDTH] IN BLOOD BY AUTOMATED COUNT: 19.6 % (ref 11.5–14.5)
EST. GFR  (NO RACE VARIABLE): >60 ML/MIN/1.73 M^2
EST. GFR  (NO RACE VARIABLE): >60 ML/MIN/1.73 M^2
GLUCOSE SERPL-MCNC: 103 MG/DL (ref 70–110)
GLUCOSE SERPL-MCNC: 106 MG/DL (ref 70–110)
HCT VFR BLD AUTO: 24.6 % (ref 37–48.5)
HGB BLD-MCNC: 7.7 G/DL (ref 12–16)
IMM GRANULOCYTES # BLD AUTO: 0.04 K/UL (ref 0–0.04)
IMM GRANULOCYTES NFR BLD AUTO: 0.8 % (ref 0–0.5)
LYMPHOCYTES # BLD AUTO: 0.7 K/UL (ref 1–4.8)
LYMPHOCYTES NFR BLD: 13.4 % (ref 18–48)
MAGNESIUM SERPL-MCNC: 1.8 MG/DL (ref 1.6–2.6)
MCH RBC QN AUTO: 29.2 PG (ref 27–31)
MCHC RBC AUTO-ENTMCNC: 31.3 G/DL (ref 32–36)
MCV RBC AUTO: 93 FL (ref 82–98)
MONOCYTES # BLD AUTO: 1 K/UL (ref 0.3–1)
MONOCYTES NFR BLD: 19.4 % (ref 4–15)
NEUTROPHILS # BLD AUTO: 3.1 K/UL (ref 1.8–7.7)
NEUTROPHILS NFR BLD: 60.8 % (ref 38–73)
NRBC BLD-RTO: 0 /100 WBC
PHOSPHATE SERPL-MCNC: 2.6 MG/DL (ref 2.7–4.5)
PHOSPHATE SERPL-MCNC: 2.6 MG/DL (ref 2.7–4.5)
PLATELET # BLD AUTO: 114 K/UL (ref 150–450)
PMV BLD AUTO: 9.2 FL (ref 9.2–12.9)
POTASSIUM SERPL-SCNC: 3.4 MMOL/L (ref 3.5–5.1)
POTASSIUM SERPL-SCNC: 3.5 MMOL/L (ref 3.5–5.1)
PROT SERPL-MCNC: 4.9 G/DL (ref 6–8.4)
RBC # BLD AUTO: 2.64 M/UL (ref 4–5.4)
SODIUM SERPL-SCNC: 129 MMOL/L (ref 136–145)
SODIUM SERPL-SCNC: 129 MMOL/L (ref 136–145)
WBC # BLD AUTO: 5.01 K/UL (ref 3.9–12.7)

## 2023-02-12 PROCEDURE — 63600175 PHARM REV CODE 636 W HCPCS: Performed by: NURSE PRACTITIONER

## 2023-02-12 PROCEDURE — 84100 ASSAY OF PHOSPHORUS: CPT | Performed by: STUDENT IN AN ORGANIZED HEALTH CARE EDUCATION/TRAINING PROGRAM

## 2023-02-12 PROCEDURE — 99223 1ST HOSP IP/OBS HIGH 75: CPT | Mod: ,,, | Performed by: STUDENT IN AN ORGANIZED HEALTH CARE EDUCATION/TRAINING PROGRAM

## 2023-02-12 PROCEDURE — 99233 PR SUBSEQUENT HOSPITAL CARE,LEVL III: ICD-10-PCS | Mod: ,,, | Performed by: HOSPITALIST

## 2023-02-12 PROCEDURE — 80069 RENAL FUNCTION PANEL: CPT | Performed by: HOSPITALIST

## 2023-02-12 PROCEDURE — 25000003 PHARM REV CODE 250

## 2023-02-12 PROCEDURE — 25000003 PHARM REV CODE 250: Performed by: STUDENT IN AN ORGANIZED HEALTH CARE EDUCATION/TRAINING PROGRAM

## 2023-02-12 PROCEDURE — 85025 COMPLETE CBC W/AUTO DIFF WBC: CPT

## 2023-02-12 PROCEDURE — 25000003 PHARM REV CODE 250: Performed by: PSYCHIATRY & NEUROLOGY

## 2023-02-12 PROCEDURE — 27000207 HC ISOLATION

## 2023-02-12 PROCEDURE — 25000003 PHARM REV CODE 250: Performed by: HOSPITALIST

## 2023-02-12 PROCEDURE — 99233 SBSQ HOSP IP/OBS HIGH 50: CPT | Mod: ,,, | Performed by: HOSPITALIST

## 2023-02-12 PROCEDURE — 63600175 PHARM REV CODE 636 W HCPCS: Performed by: STUDENT IN AN ORGANIZED HEALTH CARE EDUCATION/TRAINING PROGRAM

## 2023-02-12 PROCEDURE — 63600175 PHARM REV CODE 636 W HCPCS

## 2023-02-12 PROCEDURE — A4216 STERILE WATER/SALINE, 10 ML: HCPCS | Performed by: PSYCHIATRY & NEUROLOGY

## 2023-02-12 PROCEDURE — 63600175 PHARM REV CODE 636 W HCPCS: Performed by: HOSPITALIST

## 2023-02-12 PROCEDURE — 99223 PR INITIAL HOSPITAL CARE,LEVL III: ICD-10-PCS | Mod: ,,, | Performed by: STUDENT IN AN ORGANIZED HEALTH CARE EDUCATION/TRAINING PROGRAM

## 2023-02-12 PROCEDURE — 80053 COMPREHEN METABOLIC PANEL: CPT | Performed by: STUDENT IN AN ORGANIZED HEALTH CARE EDUCATION/TRAINING PROGRAM

## 2023-02-12 PROCEDURE — 25000003 PHARM REV CODE 250: Performed by: INTERNAL MEDICINE

## 2023-02-12 PROCEDURE — 20600001 HC STEP DOWN PRIVATE ROOM

## 2023-02-12 PROCEDURE — 97530 THERAPEUTIC ACTIVITIES: CPT

## 2023-02-12 PROCEDURE — 83735 ASSAY OF MAGNESIUM: CPT | Performed by: STUDENT IN AN ORGANIZED HEALTH CARE EDUCATION/TRAINING PROGRAM

## 2023-02-12 RX ORDER — POTASSIUM CHLORIDE 20 MEQ/1
40 TABLET, EXTENDED RELEASE ORAL ONCE
Status: COMPLETED | OUTPATIENT
Start: 2023-02-12 | End: 2023-02-12

## 2023-02-12 RX ORDER — ALPRAZOLAM 0.25 MG/1
0.25 TABLET ORAL 2 TIMES DAILY PRN
Status: DISCONTINUED | OUTPATIENT
Start: 2023-02-12 | End: 2023-02-13

## 2023-02-12 RX ORDER — SODIUM,POTASSIUM PHOSPHATES 280-250MG
2 POWDER IN PACKET (EA) ORAL ONCE
Status: COMPLETED | OUTPATIENT
Start: 2023-02-12 | End: 2023-02-12

## 2023-02-12 RX ADMIN — HEPARIN SODIUM 5000 UNITS: 5000 INJECTION INTRAVENOUS; SUBCUTANEOUS at 01:02

## 2023-02-12 RX ADMIN — MEROPENEM 2 G: 1 INJECTION INTRAVENOUS at 06:02

## 2023-02-12 RX ADMIN — ACETAMINOPHEN 1000 MG: 500 TABLET ORAL at 10:02

## 2023-02-12 RX ADMIN — Medication 10 ML: at 12:02

## 2023-02-12 RX ADMIN — METHOCARBAMOL 1000 MG: 500 TABLET ORAL at 05:02

## 2023-02-12 RX ADMIN — LACTULOSE 20 G: 20 SOLUTION ORAL at 10:02

## 2023-02-12 RX ADMIN — BISACODYL 10 MG: 10 SUPPOSITORY RECTAL at 09:02

## 2023-02-12 RX ADMIN — SPIRONOLACTONE 25 MG: 25 TABLET, FILM COATED ORAL at 09:02

## 2023-02-12 RX ADMIN — ACETAMINOPHEN 1000 MG: 500 TABLET ORAL at 09:02

## 2023-02-12 RX ADMIN — PANTOPRAZOLE SODIUM 40 MG: 40 TABLET, DELAYED RELEASE ORAL at 09:02

## 2023-02-12 RX ADMIN — FUROSEMIDE 80 MG: 10 INJECTION, SOLUTION INTRAMUSCULAR; INTRAVENOUS at 09:02

## 2023-02-12 RX ADMIN — Medication 10 ML: at 06:02

## 2023-02-12 RX ADMIN — HYDROMORPHONE HYDROCHLORIDE 0.5 MG: 1 INJECTION, SOLUTION INTRAMUSCULAR; INTRAVENOUS; SUBCUTANEOUS at 01:02

## 2023-02-12 RX ADMIN — ALPRAZOLAM 0.25 MG: 0.25 TABLET ORAL at 01:02

## 2023-02-12 RX ADMIN — LACTULOSE 20 G: 20 SOLUTION ORAL at 03:02

## 2023-02-12 RX ADMIN — GABAPENTIN 900 MG: 300 CAPSULE ORAL at 09:02

## 2023-02-12 RX ADMIN — OXYCODONE HYDROCHLORIDE 10 MG: 10 TABLET ORAL at 10:02

## 2023-02-12 RX ADMIN — HEPARIN SODIUM 5000 UNITS: 5000 INJECTION INTRAVENOUS; SUBCUTANEOUS at 05:02

## 2023-02-12 RX ADMIN — METHOCARBAMOL 1000 MG: 500 TABLET ORAL at 01:02

## 2023-02-12 RX ADMIN — FUROSEMIDE 80 MG: 10 INJECTION, SOLUTION INTRAMUSCULAR; INTRAVENOUS at 10:02

## 2023-02-12 RX ADMIN — OXYCODONE HYDROCHLORIDE 10 MG: 10 TABLET ORAL at 03:02

## 2023-02-12 RX ADMIN — Medication 10 ML: at 05:02

## 2023-02-12 RX ADMIN — MEROPENEM 2 G: 1 INJECTION INTRAVENOUS at 02:02

## 2023-02-12 RX ADMIN — METHOCARBAMOL 1000 MG: 500 TABLET ORAL at 11:02

## 2023-02-12 RX ADMIN — MEROPENEM 2 G: 1 INJECTION INTRAVENOUS at 10:02

## 2023-02-12 RX ADMIN — HYDROMORPHONE HYDROCHLORIDE 0.5 MG: 1 INJECTION, SOLUTION INTRAMUSCULAR; INTRAVENOUS; SUBCUTANEOUS at 09:02

## 2023-02-12 RX ADMIN — LACTULOSE 20 G: 20 SOLUTION ORAL at 09:02

## 2023-02-12 RX ADMIN — POTASSIUM CHLORIDE 40 MEQ: 1500 TABLET, EXTENDED RELEASE ORAL at 09:02

## 2023-02-12 RX ADMIN — HEPARIN SODIUM 5000 UNITS: 5000 INJECTION INTRAVENOUS; SUBCUTANEOUS at 12:02

## 2023-02-12 RX ADMIN — HEPARIN SODIUM 5000 UNITS: 5000 INJECTION INTRAVENOUS; SUBCUTANEOUS at 10:02

## 2023-02-12 RX ADMIN — DRONABINOL 2.5 MG: 2.5 CAPSULE ORAL at 10:02

## 2023-02-12 RX ADMIN — DRONABINOL 2.5 MG: 2.5 CAPSULE ORAL at 09:02

## 2023-02-12 RX ADMIN — HYDROMORPHONE HYDROCHLORIDE 0.5 MG: 1 INJECTION, SOLUTION INTRAMUSCULAR; INTRAVENOUS; SUBCUTANEOUS at 05:02

## 2023-02-12 RX ADMIN — GABAPENTIN 900 MG: 300 CAPSULE ORAL at 10:02

## 2023-02-12 RX ADMIN — GABAPENTIN 900 MG: 300 CAPSULE ORAL at 03:02

## 2023-02-12 RX ADMIN — BUPROPION HYDROCHLORIDE 300 MG: 300 TABLET, FILM COATED, EXTENDED RELEASE ORAL at 09:02

## 2023-02-12 RX ADMIN — POTASSIUM & SODIUM PHOSPHATES POWDER PACK 280-160-250 MG 2 PACKET: 280-160-250 PACK at 09:02

## 2023-02-12 NOTE — PROGRESS NOTES
[facility] - Telemetry Stepdown  Neurosurgery  Progress Note    Subjective:     History of Present Illness: Ms. Zazueta is a 42 y.o F w/ hx ofIV drug use (methamphetamine), chronic HCV with cirrhosis, spinal fusion (04/2021), epidural abscess with removal of hardware (02/2022), spinal fusion with hardware (T10 - Pelvis / 03/2022), obesity (BMI 39.3) and neurogenic bladder and recent shoulder surgery who initially presented to [facility] for evaluation of back pain and left leg weakness.  She reports initially noting the left leg weakness when she was about to go to the bathroom and was about to fall but was assisted by her boyfriend.  She was brought to the ED via EMS.  Urinalysis with UTI concerns and blood cultures at OSH grew ESBL E coli, and shoulder effusion concern for infection so she was treated with meropenem.  During hospitalization, she reports the weakness that started out in her left leg initially then spread upwards to her left thigh, left hip, then crossed over to the right hip and spread to her right leg.  Denies recent IV drug use. She reports her last incidence of drug use was more than 3 years ago and also denies tobacco, and alcohol abuse.  Reports cannabis use.     OSH course notable for concerning urinalysis and blood cultures positive for ESBL E coli treated with meropenem.  She was also admit to the ICU where she was treated with Precedex for significant body aches, irritability, and muscle spasms.  Concerns of a murmur on physical exam so she underwent TTE which did not show any vegetations.  Worsening lower extremity weakness workup with MRI head nonspecific, MRI cervical spine with multilevel spondylosis, X-ray spine with multilevel thoracolumbar fusion and diskectomy, focal kyphosis at T9-10 increased compared to prior.  She was started on prophylaxis amoxicillin due to her history of infected hardware.  MRI spine unable to be completed due to patient's body habitus so she was  transferred to [facility] for further imaging and Neurosurgery, Neurology evaluation.      Post-Op Info:  Procedure(s) (LRB):  LAMINECTOMY, SPINE, THORACIC, WITH FUSION (T4-Pelvis fusion w/ T9-10 corpectomies) **AIRO (N/A)   10 Days Post-Op     Interval History: Exam stable. Per plastics- keep all drains until discharge     Medications:  Continuous Infusions:  Scheduled Meds:   acetaminophen  1,000 mg Oral Q12H    bisacodyL  10 mg Rectal Every other day    buPROPion  300 mg Oral Daily    dronabinoL  2.5 mg Oral BID    furosemide (LASIX) injection  80 mg Intravenous Q12H    gabapentin  900 mg Oral TID    heparin (porcine)  5,000 Units Subcutaneous Q8H    lactulose  20 g Oral TID    meropenem (MERREM) IVPB  2 g Intravenous Q8H    methocarbamoL  1,000 mg Oral Q6H    pantoprazole  40 mg Oral Daily    sodium chloride 0.9%  10 mL Intravenous Q6H    spironolactone  25 mg Oral Daily     PRN Meds:sodium chloride, sodium chloride, dextrose 10%, dextrose 10%, glucagon (human recombinant), glucose, glucose, hydrALAZINE, HYDROmorphone, magnesium hydroxide 400 mg/5 ml, melatonin, naloxone, ondansetron, oxyCODONE **AND** oxyCODONE, polyethylene glycol, prochlorperazine, senna-docusate 8.6-50 mg, simethicone, sodium chloride 0.9%, Flushing PICC Protocol **AND** sodium chloride 0.9% **AND** sodium chloride 0.9%     Review of Systems  Objective:     Weight: (!) 140.5 kg (309 lb 11.9 oz)  Body mass index is 48.51 kg/m².  Vital Signs (Most Recent):  Temp: 97.8 °F (36.6 °C) (02/12/23 1041)  Pulse: (!) 112 (02/12/23 1105)  Resp: 18 (02/12/23 1058)  BP: (!) 97/55 (02/12/23 1041)  SpO2: 99 % (02/12/23 1041)   Vital Signs (24h Range):  Temp:  [96.2 °F (35.7 °C)-99.2 °F (37.3 °C)] 97.8 °F (36.6 °C)  Pulse:  [] 112  Resp:  [14-20] 18  SpO2:  [95 %-100 %] 99 %  BP: ()/(51-64) 97/55     Date 02/12/23 0700 - 02/13/23 0659   Shift 3586-5087 3289-5216 9125-1963 24 Hour Total   INTAKE   Shift Total(mL/kg)       OUTPUT   Drains 140    140   Shift Total(mL/kg) 140(1)   140(1)   Weight (kg) 140.5 140.5 140.5 140.5                          Closed/Suction Drain 02/02/23 1401 Right Back Bulb 15 Fr. (Active)   Site Description Other (Comment) 02/10/23 2000   Dressing Type Other (Comment) 02/10/23 2000   Dressing Status Clean;Dry;Intact 02/10/23 0752   Dressing Intervention Integrity maintained 02/10/23 0752   Drainage Serosanguineous 02/10/23 2000   Status Other (Comment) 02/10/23 2000   Output (mL) 80 mL 02/11/23 1038            Closed/Suction Drain 02/02/23 1402 Right Back Bulb 15 Fr. (Active)   Site Description Other (Comment) 02/10/23 2000   Dressing Type Transparent (Tegaderm) 02/10/23 0752   Dressing Status Dry;Clean;Intact 02/10/23 0752   Dressing Intervention Integrity maintained 02/10/23 0752   Drainage Serosanguineous 02/10/23 2000   Status To bulb suction 02/10/23 0752   Output (mL) 0 mL 02/11/23 0526            Closed/Suction Drain 02/02/23 1402 Left Back Bulb 15 Fr. (Active)   Site Description Other (Comment) 02/10/23 2000   Dressing Type Transparent (Tegaderm) 02/10/23 0752   Dressing Status Clean;Intact 02/10/23 0752   Dressing Intervention Integrity maintained 02/10/23 0752   Drainage Serosanguineous 02/10/23 2000   Status To bulb suction 02/10/23 0752   Output (mL) 50 mL 02/11/23 0526            Closed/Suction Drain 02/02/23 1402 Left Back Bulb 15 Fr. (Active)   Site Description Other (Comment) 02/10/23 2000   Dressing Type Transparent (Tegaderm) 02/10/23 0752   Dressing Status Intact;Dry;Clean 02/10/23 0752   Dressing Intervention Integrity maintained 02/10/23 0752   Drainage Serosanguineous 02/10/23 2000   Status Open to gravity drainage 02/10/23 0752   Output (mL) 0 mL 02/11/23 0526       Female External Urinary Catheter 02/10/23 2323 (Active)   Output (mL) 500 mL 02/11/23 0527       Neurosurgery Physical Exam  General: well developed, well nourished, no distress.   Head: normocephalic, atraumatic  Neurologic: Alert and oriented.  Thought content appropriate.  GCS: Motor: 6/Verbal: 5/Eyes: 4 GCS Total: 15  Mental Status: Awake, Alert, Oriented x 4  Language: No aphasia  Speech: No dysarthria  Cranial nerves: face symmetric, tongue midline, CN II-XII grossly intact.   Eyes: pupils equal, round, reactive to light with accommodation, EOMI. Dysconjugate gaze.  Pulmonary: normal respirations, no signs of respiratory distress  Skin: Skin is warm, dry and intact.  Sensory: intact to light touch throughout  Motor Strength: Moves all extremities spontaneously.     Strength   Deltoids Triceps Biceps Wrist Extension Wrist Flexion Hand    Upper: R 5/5 5/5 5/5 5/5 5/5 5/5     L 5/5 5/5 5/5 5/5 5/5 5/5       Iliopsoas Quadriceps Knee  Flexion Tibialis  anterior Gastro- cnemius EHL   Lower: R 2/5 3/5 3/5 4+/5 4+/5 4+/5     L 2/5 3/5 3/5 4+/5 4+/5 4+/5      BLE strength/ROM limited due to pain, body habitus and severe bilateral leg edema.     Thoracolumbar incision: C/D/I with staples. Skin edges well approximated. No surrounding erythema or edema. No drainage or TTP.      MIHIR drains x 4 intact to gravity with ss output.    Significant Labs:  Recent Labs   Lab 02/11/23  0430 02/11/23  1144 02/11/23  1857 02/12/23  0528    112* 109 106  103   * 128* 128* 129*  129*   K 3.7 3.7 3.8 3.4*  3.5   CL 97 97 97 99  100   CO2 23 26 22* 21*  21*   BUN 24* 24* 26* 27*  27*   CREATININE 0.6 0.5 0.6 0.5  0.5   CALCIUM 7.7* 7.4* 7.8* 7.2*  7.1*   MG 2.1  --   --  1.8       Recent Labs   Lab 02/11/23  0430 02/12/23  0528   WBC 4.34 5.01   HGB 7.4* 7.7*   HCT 24.1* 24.6*   * 114*       No results for input(s): LABPT, INR, APTT in the last 48 hours.  Microbiology Results (last 7 days)       Procedure Component Value Units Date/Time    Culture, Anaerobic [[id number]] Collected: 02/10/23 1430    Order Status: Completed Specimen: Ascites Updated: 02/12/23 1037     Anaerobic Culture Culture in progress    Aerobic culture [[id number]] Collected:  02/10/23 1430    Order Status: Completed Specimen: Ascites Updated: 02/12/23 0754     Aerobic Bacterial Culture Insufficient incubation, culture in progress    AFB Culture & Smear [[id number]]     Order Status: Completed Specimen: Ascites     Gram stain [[id number]] Collected: 02/10/23 1430    Order Status: Completed Specimen: Ascites Updated: 02/11/23 0333     Gram Stain Result No WBC's      No organisms seen    Fungus culture [[id number]] Collected: 01/24/23 0913    Order Status: Completed Specimen: Body Fluid from Back Updated: 02/08/23 0655     Fungus (Mycology) Culture Culture in progress      No fungus isolated after 2 weeks    Narrative:      1) Perispinal    Fungus culture [[id number]] Collected: 01/24/23 0943    Order Status: Completed Specimen: Bone from Back Updated: 02/08/23 0655     Fungus (Mycology) Culture Culture in progress      No fungus isolated after 2 weeks    Narrative:      3) Perispinal    Fungus culture [[id number]] Collected: 01/24/23 0943    Order Status: Completed Specimen: Bone from Back Updated: 02/08/23 0655     Fungus (Mycology) Culture Culture in progress      No fungus isolated after 2 weeks    Narrative:      4) Perispinal    Fungus culture [[id number]] Collected: 01/24/23 0913    Order Status: Completed Specimen: Body Fluid from Back Updated: 02/08/23 0655     Fungus (Mycology) Culture Culture in progress      No fungus isolated after 2 weeks    Narrative:      2) Perispinal          All pertinent labs from the last 24 hours have been reviewed.    Significant Diagnostics:  I have reviewed all pertinent imaging results/findings within the past 24 hours.  Physical Exam    Assessment/Plan:     * Weakness of both lower extremities  Pt is 42F multiple spinal surgeries and revisions last T10- Pelvis in March 2022 who presented to OSH with concern for shoulder infection and UTI found to have E coli sepsis who has had progressive worsening BLE weakness. XR showed possible worsening proximal  junctional kyphotic deformity. Transferred to OM to  and neurosurgery was consulted.    OR 1/24 for temporary T6-12 PSIF. Taz pus noted intraoperatively.   OR 2/2 for T4-pelvis revision and extension of fusion with T9-10 corpectomy with plastic surgery assistance.  Maps goals > 85 completed 2/5 in ICU.    Plan:  --Stepped down to  floor.   -q4h neurochecks.  --All labs & diagnostics reviewed.   -post op xrays pending. Obtain upright when pt able to stand or sit, prior to discharge.  --TLSO to be worn when OOB only.  --MIHIR drains x4 to gravity, monitor output qshift. Per plastics - keep all drains until discharge -- management of drains per plastics   --Plastic surgery managing incision. Open to air.   - Off-load pressure as much as possible to promote wound healing.   - Elevate with wedge, alternate sides Q2H  --Thoracic discitis: BCx 1/20 no growth. OR cultures 1/24 grew ESBL E. coli   -- ID consulted. Recs for IV meropenem with estimated end date of 3/29/23.  --Pain Control: Continue multimodal regimen. PCA pump discontinued 2/8 with poor pain control subsequently.    - Anesthesia Pain Service consulted due to refractory pain, appreciate assistance.   - No NSAIDs as pt s/p recent instrumented fusion.  --DVT PPx: SQH/TEDs/SCDs.  --NSGY will continue to follow. Please contact team with any further questions.        Carlin Quach MD  Neurosurgery  [facility] - Telemetry Stepdown

## 2023-02-12 NOTE — CARE UPDATE
RAPID RESPONSE NURSE ROUND       Rounding completed with charge RN, Beti. No additional concerns verbalized at this time. Instructed to call [phone] for further concerns or assistance.

## 2023-02-12 NOTE — PROGRESS NOTES
[facility] - [facility] Medicine  Progress Note    Patient Name: Rachel Zazueta  MRN: [id number]  Patient Class: IP- Inpatient   Admission Date: 1/19/2023  Length of Stay: 24 days  Attending Physician: Vincent Bal MD  Primary Care Provider: Dannielle Merino DO        Subjective:     Principal Problem:Weakness of both lower extremities        HPI:  Ms. Zazueta is a 42 y.o F w/ hx ofIV drug use (methamphetamine), chronic HCV with cirrhosis, spinal fusion (04/2021), epidural abscess with removal of hardware (02/2022), spinal fusion with hardware (T10 - Pelvis / 03/2022), obesity (BMI 39.3) and neurogenic bladder who initially presented to [facility] for evaluation of back pain and left leg weakness.  She reports initially noting the left leg weakness when she was about to go to the bathroom and was about to fall but was assisted by her boyfriend.  She was brought to the ED via EMS.  Urinalysis with UTI concerns and blood cultures at OSH grew ESBL E coli so she was treated with meropenem.  During hospitalization, she reports the weakness that started out in her left leg initially then spread upwards to her left thigh, left hip, then crossed over to the right hip and spread to her right leg.  Denies recent IV drug use. She reports her last incidence of drug use was more than 3 years ago and also denies tobacco, and alcohol abuse.  Reports cannabis use.    She also reports more than 10 years ago she had an episode of a headache that lasted about 4 days and was associated with residual defects of a strabismus in the left eye with associated visual disturbances.  She also reports over the past few years her friends have noticed that she sometimes has word-finding difficulty during conversations.     Reports shortness of breath when sitting up, fever, chills, back pain, lower extremity weakness(initally L>R, but now R>L), diarrhea.  Denies cough, nausea, vomiting, constipation, bladder or  bowel incontinence, dysuria, dark urine, new vision changes.    OSH course notable for concerning urinalysis and blood cultures positive for ESBL E coli treated with meropenem.  She was also admit to the ICU where she was treated with Precedex for significant body aches, irritability, and muscle spasms.  Concerns of a murmur on physical exam so she underwent TTE which did not show any vegetations.  Worsening lower extremity weakness workup with MRI head nonspecific, MRI cervical spine with multilevel spondylosis, X-ray spine with multilevel thoracolumbar fusion and diskectomy, focal kyphosis at T9-10 increased compared to prior.  She was started on prophylaxis amoxicillin due to her history of infected hardware.  MRI spine unable to be completed due to patient's body habitus so she was transferred to [facility] for further imaging and Neurosurgery, Neurology evaluation.      Overview/Hospital Course:  Pt admitted as a transfer from [facility] for worsening LE weakness, concern for spinal infection, and need for MRI which could not be done at OSH. Imaging was completed here. Worsening proximal junctional kyphosis noted at T9-10 noted with concern for discitis at T8-9, T9-10. NSGY evaluated.     Found to have T8-T10 kyphosis on MRI. 1/24 underwent laminectomy T8-T10; posterior fusion T8-T12; and washout. 2/2 underwent T4-pelvis fusion and T9-T10 corpectomy.  ID consulted for thoracic discitis infection.  Patient to complete 8 weeks of therapy with meropenem. Patient underwent flap closure with Plastic surgery.  [facility] Medicine on 02/08.    2/9   On meropenem for MDR-ESCHERICHIA COLI ESBL  sodium trended down to 129.  Neurosurgery following post OR and aiding in pain management. PCA discontinued 2/8 . On oxycodone 10mg PO q4h . requiring IV dilaudid q4 as needed,. drains to gravity output 990ml/24h . Plastic surgery following flap, managing wounds.  Wound VAC discontinued on Wednesday.  accepted to [facility] pain  management consulted for back ache- switched to multimodal therapy. sodium trended down to 127.   2/10 sodium stable at 127 . continues with increased drain output 985ml/24h . negative balance of  2.8 L lasix on hold.  nephrology consulted. abdominal X ray -  Nonobstructive bowel gas pattern.  No free air.  As before, mild gaseous distension of stomach. ammonia at 57 . sono abdomen  . tylenol changed to 1000mg q12h with cirrhosis . sono abdomen -Cirrhotic liver morphology.  Sequela of portal hypertension including moderate ascites, recanalized umbilical vein.  No focal hepatic lesions. Cholecystectomy. . Nephrology recs  fluid restriction to 1 L,  lasix 60 mg IV daily,  and trend sodium q12h.  diazepam discontinued . s/p IR paracentesis today   2/11no evidence of SBP on paracentesis. 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 AFB and Triglyerides on ascitic fluid.  noncompliant with  fluid restriction to 1 L despite counseling. sodium trended down to 126 .received 2 doses of IV lasix 60mg. drain output 250ml. Increased Lasix IV 80 BID  due to UOP 500ml/24h   2/12 UOP of 1250ml/24h. K and P replaced. sodium trended up to 129.  hepatology eval -If ascitic fluid triglyceride level is elevated, needs IR  eval for lymphangiogram and surgery eval for  ymphatic repair. xanax 0.25mg BID PRN for anxiety         Review of Systems:   Pain scale: 6/10   Constitutional:  fever,  chills, headache, vision loss, hearing loss, weight loss, Generalized weakness, falls, loss of smell, loss of taste, poor appetite,  sore throat  Respiratory: cough, shortness of breath.   Cardiovascular: chest pain, dizziness, palpitations, orthopnea, swelling of feet, syncope  Gastrointestinal: nausea, vomiting, abdominal pain-improved  diarrhea, black stool,  blood in stool, change in bowel habits  Genitourinary: hematuria, dysuria, urgency, frequency  Integument/Breast: rash,  pruritis,   Surgical wound - back   Hematologic/Lymphatic: easy bruising, lymphadenopathy  Musculoskeletal: arthralgias , myalgias, back pain, neck pain, knee pain  Neurological: confusion, seizures, tremors, slurred speech  Behavioral/Psych:  depression, anxiety, auditory or visual hallucinations     OBJECTIVE:     Physical Exam:  Body mass index is 48.51 kg/m².    Constitutional: Appears well-developed and well-nourished. severe obesity  Head: Normocephalic and atraumatic.   Neck: Normal range of motion. Neck supple.   Cardiovascular: Normal heart rate.  Regular heart rhythm.  Pulmonary/Chest: Effort normal.   Abdominal:+  distension.  No tenderness  Musculoskeletal: Normal range of motion. ++ edema. drains in place   Neurological: Alert and oriented to person, place, and time. LE strength - 3-4/5   Skin: Skin is warm and dry.   Psychiatric: Normal mood and affect. Behavior is normal.                  Vital Signs  Temp: 97.8 °F (36.6 °C) (02/12/23 1041)  Pulse: (Abnormal) 112 (02/12/23 1105)  Resp: 18 (02/12/23 1329)  BP: (Abnormal) 97/55 (02/12/23 1041)  SpO2: 99 % (02/12/23 1041)     24 Hour VS Range    Temp:  [96.2 °F (35.7 °C)-99.2 °F (37.3 °C)]   Pulse:  []   Resp:  [14-20]   BP: ()/(52-64)   SpO2:  [95 %-100 %]     Intake/Output Summary (Last 24 hours) at 2/12/2023 1449  Last data filed at 2/12/2023 0952  Gross per 24 hour   Intake no documentation   Output 1400 ml   Net -1400 ml         I/O This Shift:  I/O this shift:  In: -   Out: 140 [Drains:140]    Wt Readings from Last 3 Encounters:   02/10/23 (Abnormal) 140.5 kg (309 lb 11.9 oz)   01/19/23 110.6 kg (243 lb 12.8 oz)   01/19/23 110.6 kg (243 lb 13.3 oz)       I have personally reviewed the vitals and recorded Intake/Output     Laboratory/Diagnostic Data:    CBC/Anemia Labs: Coags:    Recent Labs   Lab 02/10/23  0236 02/11/23  0430 02/12/23  0528   WBC 5.53 4.34 5.01   HGB 7.4* 7.4* 7.7*   HCT 24.4* 24.1* 24.6*   * 102* 114*   MCV 95 93 93   RDW  20.7* 20.1* 19.6*    Recent Labs   Lab 02/06/23  1047   INR 1.3*   APTT 36.6*        Chemistries: ABG:   Recent Labs   Lab 02/10/23  0236 02/10/23  0631 02/11/23  0430 02/11/23  1144 02/11/23  1857 02/12/23  0528 02/12/23  0535   *  135*   < > 126* 128* 128* 129*  129*  --    K 3.5  3.6   < > 3.7 3.7 3.8 3.4*  3.5  --      101   < > 97 97 97 99  100  --    CO2 23  22*   < > 23 26 22* 21*  21*  --    BUN 23*  23*   < > 24* 24* 26* 27*  27*  --    CREATININE 0.5  0.6   < > 0.6 0.5 0.6 0.5  0.5  --    CALCIUM 6.9*  6.9*   < > 7.7* 7.4* 7.8* 7.2*  7.1*  --    PROT 4.5*  --  5.0*  --   --  4.9*  --    BILITOT 1.4*  --  1.4*  --   --  1.2*  --    ALKPHOS 142*  --  141*  --   --  141*  --    ALT 26  --  24  --   --  23  --    AST 36  --  35  --   --  38  --    MG 1.8  --  2.1  --   --  1.8  --    PHOS 1.7*  1.6*   < > 2.9 2.8 2.7 2.6* 2.6*    < > = values in this interval not displayed.    No results for input(s): PH, PCO2, PO2, HCO3, POCSATURATED, BE in the last 168 hours.       POCT Glucose: HbA1c:    No results for input(s): POCTGLUCOSE in the last 168 hours. Hemoglobin A1C   Date Value Ref Range Status   04/16/2021 4.6 4.0 - 5.6 % Final     Comment:     ADA Screening Guidelines:  5.7-6.4%  Consistent with prediabetes  >or=6.5%  Consistent with diabetes    High levels of fetal hemoglobin interfere with the HbA1C  assay. Heterozygous hemoglobin variants (HbS, HgC, etc)do  not significantly interfere with this assay.   However, presence of multiple variants may affect accuracy.     05/14/2019 4.2 4.0 - 5.6 % Final     Comment:     ADA Screening Guidelines:  5.7-6.4%  Consistent with prediabetes  >or=6.5%  Consistent with diabetes  High levels of fetal hemoglobin interfere with the HbA1C  assay. Heterozygous hemoglobin variants (HbS, HgC, etc)do  not significantly interfere with this assay.   However, presence of multiple variants may affect accuracy.     11/24/2018 4.2 4.0 - 5.6 % Final      Comment:     ADA Screening Guidelines:  5.7-6.4%  Consistent with prediabetes  >or=6.5%  Consistent with diabetes  High levels of fetal hemoglobin interfere with the HbA1C  assay. Heterozygous hemoglobin variants (HbS, HgC, etc)do  not significantly interfere with this assay.   However, presence of multiple variants may affect accuracy.          Cardiac Enzymes: Ejection Fractions:    No results for input(s): CPK, CPKMB, MB, TROPONINI in the last 72 hours.   EF   Date Value Ref Range Status   01/20/2023 58 % Final   12/23/2022 64 % Final          No results for input(s): COLORU, APPEARANCEUA, PHUR, SPECGRAV, PROTEINUA, GLUCUA, KETONESU, BILIRUBINUA, OCCULTUA, NITRITE, UROBILINOGEN, LEUKOCYTESUR, RBCUA, WBCUA, BACTERIA, SQUAMEPITHEL, HYALINECASTS in the last 48 hours.    Invalid input(s): WRIGHTSUR    Procalcitonin (ng/mL)   Date Value   02/14/2022 0.05   02/14/2022 0.06   04/14/2021 0.04   11/24/2018 0.37 (H)     Lactate (Lactic Acid) (mmol/L)   Date Value   01/10/2023 2.0   12/23/2022 1.8   12/23/2022 3.6 (HH)   11/14/2022 1.4   02/14/2022 1.1     BNP (pg/mL)   Date Value   11/24/2018 90   03/14/2017 87     CRP   Date Value   01/20/2023 9.4 mg/L (H)   01/10/2023 3.47 mg/dL (H)   11/16/2022 45.9 mg/L (H)   11/14/2022 11.10 mg/dL (H)   07/23/2022 1.93 mg/dL (H)   07/01/2022 1.53 mg/dL (H)   02/24/2022 7.1 mg/L   02/14/2022 29.2 mg/L (H)   02/14/2022 30.8 mg/L (H)   04/14/2021 47.5 mg/L (H)     Sed Rate (mm/Hr)   Date Value   01/20/2023 70 (H)   01/10/2023 55 (H)   11/16/2022 84 (H)   11/14/2022 67 (H)   02/24/2022 8   02/14/2022 57 (H)   02/14/2022 82 (H)   04/14/2021 57 (H)   05/13/2019 35 (H)   03/21/2017 21 (H)     No results found for: DDIMER  Ferritin (ng/mL)   Date Value   02/07/2022 126   03/21/2017 52   03/14/2017 67   12/06/2016 80     LD (U/L)   Date Value   04/24/2021 250   03/14/2017 253     Troponin I (ng/mL)   Date Value   02/08/2023 <0.006   11/24/2018 <0.006   03/20/2017 0.01   03/14/2017 <0.01      CPK (U/L)   Date Value   01/20/2023 48   02/14/2022 64   03/15/2017 44     No results found for this or any previous visit.  SARS-CoV2 (COVID-19) Qualitative PCR (no units)   Date Value   03/14/2022 Not Detected   02/20/2022 Not Detected   04/27/2021 Not Detected   04/22/2021 Not Detected     SARS-CoV-2 RNA, Amplification, Qual (no units)   Date Value   03/02/2022 Negative     POC Rapid COVID (no units)   Date Value   12/23/2022 Negative   12/10/2022 Negative   02/14/2022 Negative   04/14/2021 Negative       Microbiology labs for the last week  Microbiology Results (last 7 days)       Procedure Component Value Units Date/Time    Culture, Anaerobic [[id number]] Collected: 02/10/23 1430    Order Status: Completed Specimen: Ascites Updated: 02/12/23 1037     Anaerobic Culture Culture in progress    Aerobic culture [[id number]] Collected: 02/10/23 1430    Order Status: Completed Specimen: Ascites Updated: 02/12/23 0754     Aerobic Bacterial Culture Insufficient incubation, culture in progress    AFB Culture & Smear [[id number]]     Order Status: Completed Specimen: Ascites     Gram stain [[id number]] Collected: 02/10/23 1430    Order Status: Completed Specimen: Ascites Updated: 02/11/23 0333     Gram Stain Result No WBC's      No organisms seen    Fungus culture [[id number]] Collected: 01/24/23 0913    Order Status: Completed Specimen: Body Fluid from Back Updated: 02/08/23 0655     Fungus (Mycology) Culture Culture in progress      No fungus isolated after 2 weeks    Narrative:      1) Perispinal    Fungus culture [[id number]] Collected: 01/24/23 0943    Order Status: Completed Specimen: Bone from Back Updated: 02/08/23 0655     Fungus (Mycology) Culture Culture in progress      No fungus isolated after 2 weeks    Narrative:      3) Perispinal    Fungus culture [[id number]] Collected: 01/24/23 0943    Order Status: Completed Specimen: Bone from Back Updated: 02/08/23 0655     Fungus (Mycology) Culture Culture in progress       No fungus isolated after 2 weeks    Narrative:      4) Perispinal    Fungus culture [[id number]] Collected: 01/24/23 0913    Order Status: Completed Specimen: Body Fluid from Back Updated: 02/08/23 0655     Fungus (Mycology) Culture Culture in progress      No fungus isolated after 2 weeks    Narrative:      2) Perispinal            Reviewed and noted in plan where applicable- Please see chart for full lab data.    Lines/Drains:  PICC Double Lumen 01/26/23 1209 left basilic (Active)   Line Necessity Review Longterm central access required 02/08/23 1948   Verification by X-ray Yes 02/08/23 1948   Site Assessment No warmth;No swelling 02/08/23 1948   Extremity Assessment Distal to IV No abnormal discoloration 02/08/23 1948   Line Securement Device Secured with sutureless device 02/08/23 0730   Dressing Type Biopatch in place 02/08/23 1948   Dressing Status Clean;Dry;Intact 02/08/23 1948   Dressing Intervention Integrity maintained 02/08/23 1948   Date on Dressing 02/06/23 02/08/23 1948   Dressing Due to be Changed 02/13/23 02/08/23 1948   Distal Patency/Care flushed w/o difficulty 02/08/23 1948   Proximal 1 Patency/Care flushed w/o difficulty 02/08/23 1948   Current Insertion Depth (cm) 39 cm 01/26/23 1207   Current Exposed Catheter (cm) 0 cm 01/26/23 1207   Extremity Circumference (cm) 35 cm 01/26/23 1207   Waveform Not being transduced 02/08/23 0730   Number of days: 13            Midline Catheter Insertion/Assessment  - Single Lumen 01/12/23 2138 Right basilic vein (medial side of arm) 18g x 10cm (Active)   $ Midline Charges (Upon insertion) Bedside Insertion Performed Pt > 3 Years Old;Midline Catheter (Supply);Ultrasound Guide for Vascular Access 01/12/23 2142   Site Assessment Clean;Dry;Intact 02/08/23 1948   IV Device Securement catheter securement device 02/08/23 1948   Line Status Saline locked 02/08/23 1948   Dressing Type Biopatch in place 02/08/23 1948   Dressing Status Clean;Dry;Intact 02/08/23 1948    Dressing Intervention Integrity maintained 02/08/23 0730   Dressing Change Due 02/06/23 02/08/23 0730   Site Change Due 02/13/23 02/08/23 0730   Reason Not Rotated Not due 02/08/23 0730   Number of days: 27            Closed/Suction Drain 02/02/23 1401 Right Back Bulb 15 Fr. (Active)   Site Description Healing 02/08/23 1948   Dressing Type Transparent (Tegaderm) 02/08/23 1948   Dressing Status Clean;Dry;Intact 02/08/23 1948   Dressing Intervention Integrity maintained 02/08/23 1948   Drainage Serosanguineous 02/08/23 1948   Status Open to gravity drainage 02/08/23 1948   Output (mL) 10 mL 02/09/23 0530   Number of days: 6            Closed/Suction Drain 02/02/23 1402 Right Back Bulb 15 Fr. (Active)   Site Description Healing 02/08/23 1948   Dressing Type Transparent (Tegaderm) 02/08/23 1948   Dressing Status Clean;Dry;Intact 02/08/23 1948   Dressing Intervention Integrity maintained 02/08/23 1948   Drainage Serosanguineous 02/08/23 1948   Status Open to gravity drainage 02/08/23 1948   Output (mL) 30 mL 02/09/23 0530   Number of days: 6            Closed/Suction Drain 02/02/23 1402 Left Back Bulb 15 Fr. (Active)   Site Description Healing 02/08/23 1948   Dressing Type Transparent (Tegaderm) 02/08/23 1948   Dressing Status Clean;Dry;Intact 02/08/23 1948   Dressing Intervention Integrity maintained 02/08/23 1948   Drainage Serosanguineous 02/08/23 1948   Status Open to gravity drainage 02/08/23 1948   Output (mL) 15 mL 02/09/23 0530   Number of days: 6            Closed/Suction Drain 02/02/23 1402 Left Back Bulb 15 Fr. (Active)   Site Description Healing 02/08/23 1948   Dressing Type Transparent (Tegaderm) 02/08/23 1948   Dressing Status Clean;Dry;Intact 02/08/23 1948   Dressing Intervention Integrity maintained 02/08/23 1948   Drainage Serosanguineous 02/08/23 1948   Status Open to gravity drainage 02/08/23 1948   Output (mL) 10 mL 02/09/23 0530   Number of days: 6       Imaging      Results for orders placed  during the hospital encounter of 01/19/23    Echo Saline Bubble? Yes    Interpretation Summary  · Study is negative for intercardiac shunt that is right to left ( see text).  · The left ventricle is normal in size with concentric remodeling and normal systolic function.  · The estimated ejection fraction is 58%.  · There are segmental left ventricular wall motion abnormalities.  · Normal left ventricular diastolic function.  · Normal right ventricular size with normal right ventricular systolic function.  · Mild right atrial enlargement.  · Normal central venous pressure (3 mmHg).  · The estimated PA systolic pressure is 26 mmHg.      IR Paracentesis with Imaging  Narrative: EXAMINATION:  Ultrasound-guided paracentesis    Procedural Personnel    Attending physician(s): Ivonne Juares MD    Fellow physician(s): None    Resident physician(s): None    Advanced practice provider(s): Samantha Lucero PA-C    Pre-procedure diagnosis: Ascites    Post-procedure diagnosis: Same    Complications: No immediate complications.    CLINICAL HISTORY:  Recurrent Ascites    TECHNIQUE:  - Ultrasound-guided paracentesis    COMPARISON:  Abdominal ultrasound 1/20/23    FINDINGS:  Pre-procedure    Consent: Informed consent for the procedure was obtained and time-out was performed prior to the procedure.    Preparation: The site was prepared and draped using maximal sterile barrier technique including cutaneous antisepsis.    Anesthesia/sedation    Level of anesthesia/sedation: No sedation    Anesthesia/sedation administered by: Not applicable    Total intra-service sedation time (minutes): 0    Limited abdominal ultrasound    Limited abdominal ultrasound was performed.    Large ascites. A safe window for paracentesis was identified.    Paracentesis    Local anesthesia was administered. The peritoneal cavity was accessed on the right lower quadrant and fluid return confirmed position. Ascites was drained.    Paracentesis access technique:  Real-time ultrasound guidance    Catheter placed: 5F Yueh    Closure    The catheter was removed. A sterile bandage was applied.    Post-drainage ultrasound: Not performed    Additional Details    Additional description of procedure: None    Equipment details: None    Specimens removed: Abdominal fluid    Estimated blood loss (mL): Less than 10    Standardized report: [email or web address]    Attestation    Signer name: Ivonne Juares    I attest that I reviewed the stored images and agree with the report as written.  Impression: Ultrasound-guided paracentesis with drainage of 5000 mL of chylous fluid.    _______________________________________________________________    Electronically signed by resident: Samantha Lucero  Date:    02/10/2023  Time:    15:27    Electronically signed by: Ivonne Juares  Date:    02/10/2023  Time:    15:34  US Abdomen Limited  Narrative: EXAMINATION:  US ABDOMEN LIMITED    CLINICAL HISTORY:  RUQ pain;    TECHNIQUE:  Limited ultrasound of the right upper quadrant of the abdomen (including pancreas, liver, gallbladder, common bile duct, and spleen) was performed.    COMPARISON:  Abdominal ultrasound 01/20/2023.  CT renal stone 04/09/2022.    FINDINGS:  Pancreas: Mostly obscured by overlying bowel gas.  Visualized portions of the pancreas appear unremarkable.    Liver: Normal in size, measuring 12.7 cm. Nodular hepatic contour with coarsened parenchymal echotexture, suggesting cirrhosis.  Hepatorenal index measures 1.1.  No focal hepatic lesions.    Gallbladder: The gallbladder is surgically absent.    Biliary system: The common duct is not dilated, measuring 3 mm.  No intrahepatic ductal dilatation.    Spleen: Upper normal in size and homogeneous echotexture, measuring 11.6 x 7.9 cm.    Miscellaneous: Moderate ascites.  Right pleural effusion.  Recanalized umbilical vein.  Impression: Cirrhotic liver morphology.  Sequela of portal hypertension including moderate ascites, recanalized umbilical  vein.  No focal hepatic lesions.    Cholecystectomy.    Right pleural effusion.    Electronically signed by resident: Calvin Betancourt  Date:    02/10/2023  Time:    09:21    Electronically signed by: Curt Carnes  Date:    02/10/2023  Time:    09:51  X-Ray Abdomen AP 1 View  Narrative: EXAMINATION:  XR ABDOMEN AP 1 VIEW    CLINICAL HISTORY:  pain;    TECHNIQUE:  AP View(s) of the abdomen was performed.    COMPARISON:  February 6, 2023    FINDINGS:  Stable partially visualized extensive spinal hardware-surgical changes.  As before, there is seen to be paralleling lucency with respect to bilateral screws transfixing SI joints.  Clinical correlation.  Reconfirmed areas of skin sutures.  Reconfirmed partially visualized the some small opaque drains.  Additional EKG leads superimpose upper abdomen.  Nonobstructive bowel gas pattern.  No free air.  As before, mild gaseous distension of stomach.  Impression: As above    Electronically signed by: Brandan Aguilar  Date:    02/10/2023  Time:    08:11      Labs, Imaging, EKG and Diagnostic results from 2/12/2023 were reviewed.    Medications:  Medication list was reviewed and changes noted under Assessment/Plan.  No current facility-administered medications on file prior to encounter.     Current Outpatient Medications on File Prior to Encounter   Medication Sig Dispense Refill    albuterol (ACCUNEB) 1.25 mg/3 mL Nebu Take 3 mLs (1.25 mg total) by nebulization every 6 (six) hours as needed (shortness of breath). Rescue 75 mL 0    buPROPion (WELLBUTRIN) 100 MG tablet Take 1 tablet (100 mg total) by mouth 2 (two) times daily. 60 tablet 2    folic acid (FOLVITE) 1 MG tablet Take 1 tablet (1 mg total) by mouth once daily. (Patient not taking: Reported on 11/25/2022) 42 tablet 0    gabapentin (NEURONTIN) 300 MG capsule Take 1 capsule (300 mg total) by mouth 3 (three) times daily. 90 capsule 11    lactulose (CHRONULAC) 20 gram/30 mL Soln Take 30 mLs (20 g total) by mouth 3 (three)  times daily. 2700 mL 2    pantoprazole (PROTONIX) 40 MG tablet Take 1 tablet (40 mg total) by mouth once daily. 30 tablet 1    [DISCONTINUED] diclofenac sodium (VOLTAREN) 1 % Gel Apply 2 g topically 4 (four) times daily. 50 g 0     Scheduled Medications:  acetaminophen, 1,000 mg, Oral, Q12H  bisacodyL, 10 mg, Rectal, Every other day  buPROPion, 300 mg, Oral, Daily  dronabinoL, 2.5 mg, Oral, BID  furosemide (LASIX) injection, 80 mg, Intravenous, Q12H  gabapentin, 900 mg, Oral, TID  heparin (porcine), 5,000 Units, Subcutaneous, Q8H  lactulose, 20 g, Oral, TID  meropenem (MERREM) IVPB, 2 g, Intravenous, Q8H  methocarbamoL, 1,000 mg, Oral, Q6H  pantoprazole, 40 mg, Oral, Daily  sodium chloride 0.9%, 10 mL, Intravenous, Q6H  spironolactone, 25 mg, Oral, Daily      PRN: sodium chloride, sodium chloride, ALPRAZolam, dextrose 10%, dextrose 10%, glucagon (human recombinant), glucose, glucose, hydrALAZINE, HYDROmorphone, magnesium hydroxide 400 mg/5 ml, melatonin, naloxone, ondansetron, oxyCODONE **AND** oxyCODONE, polyethylene glycol, prochlorperazine, senna-docusate 8.6-50 mg, simethicone, sodium chloride 0.9%, Flushing PICC Protocol **AND** sodium chloride 0.9% **AND** sodium chloride 0.9%  Infusions:   Estimated Creatinine Clearance: 215.7 mL/min (based on SCr of 0.5 mg/dL).    Assessment/Plan:      * Weakness of both lower extremities  42 year old woman s/p multiple spinal surgeries presented to OSH with possible infection of shoulder. Found to have UTI and E. Coli bacteremia and progressively worse BLE weakness. [place] [facility] [place]. Underwent Laminectomy T8-T10; Posterior spinal fusion T8-T12; hardware removal and washout on 1/24. Admitted to [facility] postop. On 2/2 underwent T4-pelvis fusion and T9-T10 corpectomies. To complete 8 week course of meropenem per ID for ESBL E coli from bone culture, end date 3/29.     - neuro checks q4h  - HV drains x 2 in place, management per NSGY  - meropenem for  ESBL bacteremia per ID, eot 3/29/23  - Plastic surgery follow, wound vac discontinued  - CMP, Mag, Phos, CBC daily  - MAP goals >65, SBP < 180  - PRN labetalol, hydralazine  - MM pain regimen: PRN Dilaudid, Tylenol, gabapentin, robaxin, PRN oxycodone  - Aggressive bowel regimen  - PT/OT   2/9   On meropenem for MDR-ESCHERICHIA COLI ESBL  sodium trended down to 129.  Neurosurgery following post OR and aiding in pain management. PCA discontinued 2/8 . On oxycodone 10mg PO q4h . requiring IV dilaudid q4 as needed,. drains to gravity output 990ml/24h . Plastic surgery following flap, managing wounds.  Wound VAC discontinued on Wednesday.  accepted to [facility]   2/11 needs post op xrays when able to stand or sit, prior to discharge    Ascites due to alcoholic cirrhosis  2/10  sono abdomen -Cirrhotic liver morphology.  Sequela of portal hypertension including moderate ascites, recanalized umbilical vein.  No focal hepatic lesions. Cholecystectomy. s/p IR paracentesis as above  2/11no evidence of SBP on paracentesis. 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 AFB and Triglyerides on ascitic fluid.    2/12  hepatology eval -If ascitic fluid triglyceride level is elevated, needs IR  eval for lymphangiogram and surgery eval for  lymphatic repair      Abdominal pain  2/10  abdominal X ray -  Nonobstructive bowel gas pattern.  No free air.  As before, mild gaseous distension of stomach. ammonia at 57 . sono abdomen with ascitis . simethicone PRN  2/11no evidence of SBP on paracentesis. 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 AFB and Triglyerides on ascitic fluid.        Hyponatremia  - Patient with sodium   Recent Labs   Lab 02/11/23  1144 02/11/23  1857 02/12/23  0528   * 128* 129*  129*   . sodium trending down    2/9 monitor on lasix BID.sodium trended down to 127.   2/10 sodium stable at 127 . continues  with increased drain output 985ml/24h . negative balance of  2.8 L lasix on hold.  nephrology consulted.  Nephrology recs  fluid restriction to 1 L,  lasix 60 mg IV daily,  and trend sodium q12h.  diazepam discontinued   2/11  noncompliant with  fluid restriction to 1 L despite counseling. sodium trended down to 126 .received 2 doses of IV lasix 60mg. Increased Lasix IV 80 BID  due to UOP 500ml/24h   2/12 UOP of 1250ml/24h.sodium trended up to 129     Acute blood loss anemia  Hb 6.4 on 2/6, received 1U pRBCs. Hb 2/7 WNL.  - monitor Hb  - transfuse to maintain Hb >7  2/9    Patient's with Normocytic anemia.. Hemoglobin stable. Etiology likely due to chronic disease .  Current CBC reviewed-    Recent Labs   Lab 02/10/23  0236 02/11/23  0430 02/12/23  0528   HGB 7.4* 7.4* 7.7*    Monitor CBC and transfuse if H/H drops below 7/21.       Kyphosis of thoracolumbar region  See weakness    Thoracic discitis  See Weakness of both lower extremities    Anxiety and depression  Evaluated by Psychiatry at OSH prior to transfer. Recommended increasing bupropion.  - Continue bupropion 300 mg QD  - Gabapentin 600 mg TID for anxiety and neuropathy  - PRN diazepam 5 mg q6h      Severe obesity (BMI >= 40)  Body mass index is 48.51 kg/m². Morbid obesity complicates all aspects of disease management from diagnostic modalities to treatment. Weight loss encouraged        Substance use disorder  Denies IV drug use (meth) for past 3 years. Denies alcohol and tobacco abuse.     Chronic hepatitis C with cirrhosis  See above    Cirrhosis of liver with ascites  MELD-Na score: 11 at 2/8/2023  4:55 AM  MELD score: 11 at 2/8/2023  4:55 AM  Calculated from:  Serum Creatinine: 0.5 mg/dL (Using min of 1 mg/dL) at 2/8/2023  4:55 AM  Serum Sodium: 131 mmol/L at 2/8/2023  4:55 AM  Total Bilirubin: 1.6 mg/dL at 2/8/2023  4:55 AM  INR(ratio): 1.3 at 2/6/2023 10:47 AM  Age: 42 years      Patient has reportedly refused transplant evaluation in the past.   -  Daily CMP and trend LFTs  - Continue Lactulose 20 g TID  - Lasix 40 mg PO BID  - Will need Hepatology follow up outpatient  2/10   ammonia at 57 . sono abdomen  . tylenol changed to 1000mg q12h with cirrhosis . s/p IR paracentesis today   2/11no evidence of SBP on paracentesis. 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 AFB and Triglyerides on ascitic fluid.        VTE Risk Mitigation (From admission, onward)           Ordered     heparin (porcine) injection 5,000 Units  Every 8 hours         02/04/23 0848     IP VTE HIGH RISK PATIENT  Once         01/19/23 2153     Place sequential compression device  Until discontinued         01/19/23 2153     Reason for No Pharmacological VTE Prophylaxis  Once        Question:  Reasons:  Answer:  Physician Provided (leave comment)  Comment:  Potential NSGY procedure    01/19/23 2153                    Discharge Planning   SHIRA: 2/15/2023     Code Status: Partial Code   Is the patient medically ready for discharge?: No    Reason for patient still in hospital (select all that apply): Treatment  Discharge Plan A: Long-term acute care facility (LTAC)   Discharge Delays: None known at this time              Vincent Bal MD  Department of Hospital Medicine   [facility] - Telemetry Stepdown

## 2023-02-12 NOTE — CONSULTS
[facility]  Hepatology  Consult Note    Patient Name: Rachel Zazueta  MRN: [id number]  Admission Date: 1/19/2023  Hospital Length of Stay: 24 days  Code Status: Partial Code   Attending Provider: Vincent Bal MD   Consulting Provider: Brandon Elizabeth MD  Primary Care Physician: Dannielle Merino DO  Principal Problem:Weakness of both lower extremities    Inpatient consult to Hepatology  Consult performed by: Brandon Elizabeth MD  Consult ordered by: Vincent Bal MD      Subjective:     HPI: Rachel Zazueta is a 42 y.o. female with history of IVDU (methamphetamine), HCV cirrhosis, spinal fusion with hardware with history of epidural abscess and hardware removal (Feb 2022) who presented to OSH for back pain and L leg weakness. Found to have ESBL E coli. Worsening LE wekaness prompted MRI but unable to be completed due to body habitus. XR raised concern for discitis and possible infected hardware. Transfer[place] [facility] [place] and NSGY evaluation. Underwent laminectomy and T8-T12 posterior fusion on 1/24 then 2/2 T4-pelvic fusion and T9-T10 corpectomy. ID consulted for thoracic discitits. Flap closure with lpastic surgery. On ester for ESBL e. Coli. US done for abd pain showed moderate volume ascites. Underwent paracentesis on 2/10, negative for SBP but chylous fluid 5L removed.            Past Medical History:   Diagnosis Date    Anemia     Anxiety     Depressed     Diskitis     Hep C w/o coma, chronic     IV drug abuse     Kidney stone     Liver cirrhosis        Past Surgical History:   Procedure Laterality Date    ARTHROTOMY OF SHOULDER Left 11/15/2022    Procedure: ARTHROTOMY, SHOULDER;  Surgeon: Bjorn Hayes MD;  Location: [facility];  Service: Orthopedics;  Laterality: Left;  Left acromioclavicular joint arthrotomy    BACK SURGERY      benine tumor removal      forehead, age 9    CHOLECYSTECTOMY      KIDNEY SURGERY      LUMBAR FUSION Bilateral 3/2/2022    Procedure: FUSION,  SPINE, LUMBAR;  Surgeon: Guille Templeton MD;  Location: [facility] OR 2ND FLR;  Service: Neurosurgery;  Laterality: Bilateral;  AIRO  T10--Pelvis    LUMBAR FUSION N/A 1/24/2023    Procedure: **AIRO** T8-T12 POSTERIOR FUSION, T8-T10 LAMINECTOMY, HARDWARE REMOVAL AND WASHOUT;  Surgeon: Guille Templeton MD;  Location: [facility] OR 2ND FLR;  Service: Neurosurgery;  Laterality: N/A;    REMOVAL OF HARDWARE FROM SPINE N/A 2/21/2022    Procedure: REMOVAL, HARDWARE, SPINE;  Surgeon: Guille Templeton MD;  Location: [facility] OR 2ND FLR;  Service: Neurosurgery;  Laterality: N/A;  [place]    SPINE SURGERY      THORACIC LAMINECTOMY WITH FUSION N/A 2/2/2023    Procedure: LAMINECTOMY, SPINE, THORACIC, WITH FUSION (T4-Pelvis fusion w/ T9-10 corpectomies) **AIRO;  Surgeon: Guille Templeton MD;  Location: [facility] OR 2ND FLR;  Service: Neurosurgery;  Laterality: N/A;  AIRO, 2 bovies, aquamantys, Globus, Depuy, antibiotic beads, TXA, Peroxide; Babycos plastics closure       Family History   Problem Relation Age of Onset    Hepatitis Mother     Drug abuse Mother     Liver cancer Mother     Drug abuse Father     Cirrhosis Father     Hepatitis Father        Social History     Socioeconomic History    Marital status:    Tobacco Use    Smoking status: Some Days     Packs/day: 0.50     Years: 23.00     Pack years: 11.50     Types: Cigarettes    Smokeless tobacco: Never    Tobacco comments:     patient states she knows she nees to quit.   Substance and Sexual Activity    Alcohol use: Not Currently     Comment: quit 2014    Drug use: Yes     Frequency: 4.0 times per week     Types: Marijuana     Comment: Patient denies needle use, only inhalation of methampehtamines or orally.  Last known drug use was prior to admission to hospital stay for surgery    Sexual activity: Yes     Partners: Male     Birth control/protection: None     Social Determinants of Health     Financial Resource Strain: Medium Risk    Difficulty of Paying Living Expenses: Somewhat hard   Food  Insecurity: No Food Insecurity    Worried About Running Out of Food in the Last Year: Never true    Ran Out of Food in the Last Year: Never true   Transportation Needs: Unmet Transportation Needs    Lack of Transportation (Medical): Yes    Lack of Transportation (Non-Medical): Yes   Physical Activity: Inactive    Days of Exercise per Week: 0 days    Minutes of Exercise per Session: 0 min   Stress: Stress Concern Present    Feeling of Stress : Very much   Social Connections: Unknown    Frequency of Communication with Friends and Family: More than three times a week    Frequency of Social Gatherings with Friends and Family: Never    Active Member of Clubs or Organizations: No    Attends Club or Organization Meetings: Never    Marital Status:    Housing Stability: High Risk    Unable to Pay for Housing in the Last Year: No    Number of Places Lived in the Last Year: 1    Unstable Housing in the Last Year: Yes       No current facility-administered medications on file prior to encounter.     Current Outpatient Medications on File Prior to Encounter   Medication Sig Dispense Refill    albuterol (ACCUNEB) 1.25 mg/3 mL Nebu Take 3 mLs (1.25 mg total) by nebulization every 6 (six) hours as needed (shortness of breath). Rescue 75 mL 0    buPROPion (WELLBUTRIN) 100 MG tablet Take 1 tablet (100 mg total) by mouth 2 (two) times daily. 60 tablet 2    folic acid (FOLVITE) 1 MG tablet Take 1 tablet (1 mg total) by mouth once daily. (Patient not taking: Reported on 11/25/2022) 42 tablet 0    gabapentin (NEURONTIN) 300 MG capsule Take 1 capsule (300 mg total) by mouth 3 (three) times daily. 90 capsule 11    lactulose (CHRONULAC) 20 gram/30 mL Soln Take 30 mLs (20 g total) by mouth 3 (three) times daily. 2700 mL 2    pantoprazole (PROTONIX) 40 MG tablet Take 1 tablet (40 mg total) by mouth once daily. 30 tablet 1    [DISCONTINUED] diclofenac sodium (VOLTAREN) 1 % Gel Apply 2 g topically 4 (four) times daily. 50 g 0        Review of patient's allergies indicates:   Allergen Reactions    Bee venom protein (honey bee) Anaphylaxis     Patient reports she is allergic to bee stings.    Naproxen Anaphylaxis     Throat closing    12-23- Patient reports taking Ibuprofen 200 mg at home without problems. Verified X3. [place]    Wasp sting [allergen ext-venom-honey bee] Anaphylaxis    Adhesive Blisters    Iodine and iodide containing products Hives     Allergic to iodine in seafood only    Shellfish containing products     Nuts [tree nut] Rash       ROS     Objective:     Vitals:    02/12/23 0702   BP:    Pulse: (!) 111   Resp:    Temp:          Constitutional:  not in acute distress and well developed  HENT: Head: Normal, normocephalic, atraumatic.  Eyes: conjunctiva clear and sclera nonicteric  Cardiovascular: regular rate and rhythm and no murmur  Respiratory: normal chest expansion & respiratory effort   and no accessory muscle use  GI: soft, distended, notender, without masses or organomegaly  Musculoskeletal: no muscular tenderness noted. LE edema  Skin: normal color  Neurological: alert, oriented x3  Psychiatric: mood and affect are within normal limits, pt is a good historian; no memory problems were noted    Significant Labs:  Recent Labs   Lab 02/09/23  0356 02/10/23  0236 02/11/23  0430   HGB 8.1* 7.4* 7.4*       Lab Results   Component Value Date    WBC 4.34 02/11/2023    HGB 7.4 (L) 02/11/2023    HCT 24.1 (L) 02/11/2023    MCV 93 02/11/2023     (L) 02/11/2023       Lab Results   Component Value Date     (L) 02/12/2023     (L) 02/12/2023    K 3.4 (L) 02/12/2023    K 3.5 02/12/2023    CL 99 02/12/2023     02/12/2023    CO2 21 (L) 02/12/2023    CO2 21 (L) 02/12/2023    BUN 27 (H) 02/12/2023    BUN 27 (H) 02/12/2023    CREATININE 0.5 02/12/2023    CREATININE 0.5 02/12/2023    CALCIUM 7.2 (L) 02/12/2023    CALCIUM 7.1 (L) 02/12/2023    ANIONGAP 9 02/12/2023    ANIONGAP 8 02/12/2023    ESTGFRAFRICA >60.0  07/23/2022    EGFRNONAA >60.0 07/23/2022       Lab Results   Component Value Date    ALT 23 02/12/2023    AST 38 02/12/2023    GGT 57 (H) 02/07/2022    ALKPHOS 141 (H) 02/12/2023    BILITOT 1.2 (H) 02/12/2023       Lab Results   Component Value Date    INR 1.3 (H) 02/06/2023    INR 1.2 02/01/2023    INR 1.4 (H) 01/25/2023       Significant Imaging:  Reviewed pertinent radiology findings.       Assessment/Plan:     Rachel Zazueta is a 42 y.o. female with history of IVDU (methamphetamine), HCV cirrhosis, spinal fusion with hardware with history of epidural abscess and hardware removal (Feb 2022) who is here for thoracic discitis with ESBL E. Coli s/p laminectomy, thoracic-pelvis fusion with corpectomies by NSGY and flap closures by plastics. 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 Would likely be from recent surgical interventions interrupting lymphatic drainage. Has known HCV cirrhosis, no prior ascites. Ascitic TG level pending. SAAG elevated consistent with portal HTN. Need TG level to confirm if chylous ascites, if not, could be decompensation of liver disease after recent surgeries causing worsening portal hypertension.    Problem List:  Ascites  HCV cirrhosis    MELD-Na score: 11 at 2/8/2023  4:55 AM  MELD score: 11 at 2/8/2023  4:55 AM  Calculated from:  Serum Creatinine: 0.5 mg/dL (Using min of 1 mg/dL) at 2/8/2023  4:55 AM  Serum Sodium: 131 mmol/L at 2/8/2023  4:55 AM  Total Bilirubin: 1.6 mg/dL at 2/8/2023  4:55 AM  INR(ratio): 1.3 at 2/6/2023 10:47 AM  Age: 42 years      Recommendations:  - Please obtain ascitic fluid triglyceride level  - Continue IV diuresis per nephrology recs, consider increasing spironolactone to 100 mg daily  - If ascitic fluid triglyceride level is elevated, would consult IR and discuss with surgery for possible lymphangiogram and/or lymphatic repair  - Would work up for alternate sources of chylous ascites if confirmed: pancreatitis, tuberculosis (ascites AFB  smear ordered), RV failure or cardiomyopathy  - Continue lactulose titrated to 2-3 BMs per day    Thank you for involving us in the care of Rachel Zazueta. Please call with any additional questions, concerns or changes in the patient's clinical status. We will continue to follow.     Brandon Elizabeth MD  Gastroenterology & Hepatology Fellow PGY IV   [facility]

## 2023-02-12 NOTE — NURSING
Assumed care of patient from [facility] after patient requesting a new nurse. Pt AAA, NAD noted, will continue to monitor

## 2023-02-12 NOTE — PT/OT/SLP PROGRESS
Occupational Therapy   Treatment    Name: Rachel Zazueta  MRN: [id number]  Admitting Diagnosis:  Weakness of both lower extremities  10 Days Post-Op    Recommendations:     Discharge Recommendations: nursing facility, skilled  Discharge Equipment Recommendations:  hospital bed, lift device  Barriers to discharge:  Inaccessible home environment, Decreased caregiver support    Assessment:     Rachel Zazueta is a 42 y.o. female with a medical diagnosis of Weakness of both lower extremities.  She presents with impairments listed below. Pt did well to participate in the session, but displayed limited overall tolerance for therapy on this date. Pt presented w/ self-limiting behaviors, but pt is also highly limited due to pain. Pt is functioning well below baseline and is requiring increased overall assist. Pt displayed global deconditioning requiring increased assist for ADLs and mobility at this time. Pt would benefit from skilled OT services to improve independence and overall occupational functioning.     Performance deficits affecting function are weakness, impaired endurance, impaired self care skills, impaired functional mobility, gait instability, impaired balance, decreased coordination, decreased lower extremity function, pain, decreased ROM, orthopedic precautions.     Rehab Prognosis:  Good; patient would benefit from acute skilled OT services to address these deficits and reach maximum level of function.       Plan:     Patient to be seen 3 x/week to address the above listed problems via self-care/home management, therapeutic activities, therapeutic exercises, neuromuscular re-education  Plan of Care Expires: 02/20/23  Plan of Care Reviewed with: patient    Subjective        Pain/Comfort:  Pain Rating 1: 10/10  Location - Orientation 1: generalized  Location 1: abdomen  Pain Addressed 1: Pre-medicate for activity, Reposition, Distraction  Pain Rating Post-Intervention 1:  (did not rate)    Objective:      Upon walking in the room, pt was very upset w/ staff. Pt was cursing and expressing frustration with her care. Writing therapist re-directed the pt to participating in therapy. Re-direction was accomplished and pt was very agreeable to participate in therapy.    Communicated with: RN prior to session.  Patient found HOB elevated with telemetry, peripheral IV, PureWick, MIHIR drain upon OT entry to room.    General Precautions: Standard, fall, contact    Orthopedic Precautions:spinal precautions  Braces: TLSO  Respiratory Status: Room air     Occupational Performance:     Bed Mobility:    Patient completed Rolling/Turning to Left with  maximal assistance  Patient completed Scooting/Bridging with total assistance  Patient completed Supine to Sit with total assistance  Patient completed Sit to Supine with total assistance     Functional Mobility/Transfers:  Not appropriate at this time due to poor sitting balance.      Activities of Daily Living:  Upper Body Dressing: maximal assistance gown management in sitting       [facility] 6 Click ADL: 13    Treatment & Education:  Pt sat EOB ~5 minutes at MAX A. Pt with high levels of anxiety and c/o increased pain. Pt w/ posterior trunk lean citing abdominal pain w/ forward flexion. Pt requesting OT to allow her to lie back down, but pt agreeable to sitting for ~5 minutes.  Pt provided w/ extensive psychosocial support throughout the session.   Pt educated on:    POC & overall coordination of care   Importance of oob activities  Importance of participating in therapy  Possible benefits of therapy    Patient left HOB elevated with all lines intact, call button in reach, and friend present    GOALS:   Multidisciplinary Problems       Occupational Therapy Goals          Problem: Occupational Therapy    Goal Priority Disciplines Outcome Interventions   Occupational Therapy Goal     OT, PT/OT Ongoing, Progressing    Description: Goals to be met by: 2/10     Patient will increase  functional independence with ADLs by performing:    UE Dressing with Moderate Assistance.  Grooming while seated with Minimal Assistance.  Sitting at edge of bed x15 minutes with Minimal Assistance.  Rolling to Bilateral with Minimal Assistance.   Supine to sit with Moderate Assistance.                         Time Tracking:     OT Date of Treatment: 02/12/23  OT Start Time: 1137  OT Stop Time: 1200  OT Total Time (min): 23 min    Billable Minutes:Therapeutic Activity 23 minutes    OT/ROSS: OT          2/12/2023

## 2023-02-13 LAB
ALBUMIN FLD-MCNC: <0.4 G/DL
ALBUMIN SERPL BCP-MCNC: 1.7 G/DL (ref 3.5–5.2)
ALBUMIN SERPL BCP-MCNC: 1.9 G/DL (ref 3.5–5.2)
ALBUMIN SERPL BCP-MCNC: 1.9 G/DL (ref 3.5–5.2)
ALP SERPL-CCNC: 148 U/L (ref 55–135)
ALT SERPL W/O P-5'-P-CCNC: 26 U/L (ref 10–44)
AMYLASE, BODY FLUID: 34 U/L
ANION GAP SERPL CALC-SCNC: 4 MMOL/L (ref 8–16)
ANION GAP SERPL CALC-SCNC: 5 MMOL/L (ref 8–16)
ANION GAP SERPL CALC-SCNC: 7 MMOL/L (ref 8–16)
APPEARANCE FLD: NORMAL
APPEARANCE FLD: NORMAL
AST SERPL-CCNC: 40 U/L (ref 10–40)
BASOPHILS # BLD AUTO: 0.04 K/UL (ref 0–0.2)
BASOPHILS NFR BLD: 0.8 % (ref 0–1.9)
BILIRUB SERPL-MCNC: 1.2 MG/DL (ref 0.1–1)
BODY FLD TYPE: NORMAL
BODY FLD TYPE: NORMAL
BODY FLUID SOURCE AMYLASE: NORMAL
BODY FLUID SOURCE, LDH: NORMAL
BODY FLUID SOURCE, LDH: NORMAL
BUN SERPL-MCNC: 29 MG/DL (ref 6–20)
BUN SERPL-MCNC: 29 MG/DL (ref 6–20)
BUN SERPL-MCNC: 33 MG/DL (ref 6–20)
CALCIUM SERPL-MCNC: 7.4 MG/DL (ref 8.7–10.5)
CALCIUM SERPL-MCNC: 7.5 MG/DL (ref 8.7–10.5)
CALCIUM SERPL-MCNC: 7.6 MG/DL (ref 8.7–10.5)
CHLORIDE SERPL-SCNC: 97 MMOL/L (ref 95–110)
CO2 SERPL-SCNC: 23 MMOL/L (ref 23–29)
CO2 SERPL-SCNC: 26 MMOL/L (ref 23–29)
CO2 SERPL-SCNC: 26 MMOL/L (ref 23–29)
COLOR FLD: COLORLESS
COLOR FLD: COLORLESS
CREAT SERPL-MCNC: 0.6 MG/DL (ref 0.5–1.4)
DIFFERENTIAL METHOD: ABNORMAL
EOSINOPHIL # BLD AUTO: 0.3 K/UL (ref 0–0.5)
EOSINOPHIL NFR BLD: 5.2 % (ref 0–8)
ERYTHROCYTE [DISTWIDTH] IN BLOOD BY AUTOMATED COUNT: 19.6 % (ref 11.5–14.5)
EST. GFR  (NO RACE VARIABLE): >60 ML/MIN/1.73 M^2
GLUCOSE FLD-MCNC: 120 MG/DL
GLUCOSE SERPL-MCNC: 116 MG/DL (ref 70–110)
GLUCOSE SERPL-MCNC: 119 MG/DL (ref 70–110)
GLUCOSE SERPL-MCNC: 123 MG/DL (ref 70–110)
GRAM STN SPEC: NORMAL
GRAM STN SPEC: NORMAL
HCT VFR BLD AUTO: 24.9 % (ref 37–48.5)
HGB BLD-MCNC: 7.8 G/DL (ref 12–16)
IMM GRANULOCYTES # BLD AUTO: 0.03 K/UL (ref 0–0.04)
IMM GRANULOCYTES NFR BLD AUTO: 0.6 % (ref 0–0.5)
LDH FLD L TO P-CCNC: 31 U/L
LDH FLD L TO P-CCNC: 34 U/L
LYMPHOCYTES # BLD AUTO: 0.7 K/UL (ref 1–4.8)
LYMPHOCYTES NFR BLD: 15.1 % (ref 18–48)
LYMPHOCYTES NFR FLD MANUAL: 49 %
LYMPHOCYTES NFR FLD MANUAL: 53 %
MAGNESIUM SERPL-MCNC: 1.9 MG/DL (ref 1.6–2.6)
MCH RBC QN AUTO: 28.8 PG (ref 27–31)
MCHC RBC AUTO-ENTMCNC: 31.3 G/DL (ref 32–36)
MCV RBC AUTO: 92 FL (ref 82–98)
MESOTHL CELL NFR FLD MANUAL: 1 %
MESOTHL CELL NFR FLD MANUAL: 2 %
MONOCYTES # BLD AUTO: 0.8 K/UL (ref 0.3–1)
MONOCYTES NFR BLD: 17.3 % (ref 4–15)
MONOS+MACROS NFR FLD MANUAL: 22 %
MONOS+MACROS NFR FLD MANUAL: 28 %
NEUTROPHILS # BLD AUTO: 3 K/UL (ref 1.8–7.7)
NEUTROPHILS NFR BLD: 61 % (ref 38–73)
NEUTROPHILS NFR FLD MANUAL: 22 %
NEUTROPHILS NFR FLD MANUAL: 23 %
NRBC BLD-RTO: 0 /100 WBC
PHOSPHATE SERPL-MCNC: 2.9 MG/DL (ref 2.7–4.5)
PHOSPHATE SERPL-MCNC: 2.9 MG/DL (ref 2.7–4.5)
PHOSPHATE SERPL-MCNC: 3 MG/DL (ref 2.7–4.5)
PLATELET # BLD AUTO: 112 K/UL (ref 150–450)
PMV BLD AUTO: 9.3 FL (ref 9.2–12.9)
POTASSIUM SERPL-SCNC: 4 MMOL/L (ref 3.5–5.1)
POTASSIUM SERPL-SCNC: 4 MMOL/L (ref 3.5–5.1)
POTASSIUM SERPL-SCNC: 4.2 MMOL/L (ref 3.5–5.1)
PROT FLD-MCNC: <1 G/DL
PROT FLD-MCNC: <1 G/DL
PROT SERPL-MCNC: 5 G/DL (ref 6–8.4)
RBC # BLD AUTO: 2.71 M/UL (ref 4–5.4)
SODIUM SERPL-SCNC: 127 MMOL/L (ref 136–145)
SODIUM SERPL-SCNC: 127 MMOL/L (ref 136–145)
SODIUM SERPL-SCNC: 128 MMOL/L (ref 136–145)
SPECIMEN SOURCE: NORMAL
WBC # BLD AUTO: 4.85 K/UL (ref 3.9–12.7)
WBC # FLD: 156 /CU MM
WBC # FLD: 182 /CU MM

## 2023-02-13 PROCEDURE — 25000003 PHARM REV CODE 250: Performed by: STUDENT IN AN ORGANIZED HEALTH CARE EDUCATION/TRAINING PROGRAM

## 2023-02-13 PROCEDURE — 99024 POSTOP FOLLOW-UP VISIT: CPT | Mod: ,,, | Performed by: PHYSICIAN ASSISTANT

## 2023-02-13 PROCEDURE — 87102 FUNGUS ISOLATION CULTURE: CPT | Performed by: HOSPITALIST

## 2023-02-13 PROCEDURE — 84311 SPECTROPHOTOMETRY: CPT | Performed by: HOSPITALIST

## 2023-02-13 PROCEDURE — 20600001 HC STEP DOWN PRIVATE ROOM

## 2023-02-13 PROCEDURE — 63600175 PHARM REV CODE 636 W HCPCS: Performed by: NURSE PRACTITIONER

## 2023-02-13 PROCEDURE — 84478 ASSAY OF TRIGLYCERIDES: CPT | Performed by: HOSPITALIST

## 2023-02-13 PROCEDURE — 80053 COMPREHEN METABOLIC PANEL: CPT | Performed by: STUDENT IN AN ORGANIZED HEALTH CARE EDUCATION/TRAINING PROGRAM

## 2023-02-13 PROCEDURE — 63600175 PHARM REV CODE 636 W HCPCS: Performed by: STUDENT IN AN ORGANIZED HEALTH CARE EDUCATION/TRAINING PROGRAM

## 2023-02-13 PROCEDURE — 84100 ASSAY OF PHOSPHORUS: CPT | Performed by: STUDENT IN AN ORGANIZED HEALTH CARE EDUCATION/TRAINING PROGRAM

## 2023-02-13 PROCEDURE — 25000003 PHARM REV CODE 250

## 2023-02-13 PROCEDURE — 82150 ASSAY OF AMYLASE: CPT | Performed by: HOSPITALIST

## 2023-02-13 PROCEDURE — 63600175 PHARM REV CODE 636 W HCPCS

## 2023-02-13 PROCEDURE — 83735 ASSAY OF MAGNESIUM: CPT | Performed by: STUDENT IN AN ORGANIZED HEALTH CARE EDUCATION/TRAINING PROGRAM

## 2023-02-13 PROCEDURE — 27000207 HC ISOLATION

## 2023-02-13 PROCEDURE — 99223 PR INITIAL HOSPITAL CARE,LEVL III: ICD-10-PCS | Mod: AF,HB,, | Performed by: PSYCHIATRY & NEUROLOGY

## 2023-02-13 PROCEDURE — 99232 PR SUBSEQUENT HOSPITAL CARE,LEVL II: ICD-10-PCS | Mod: ,,, | Performed by: INTERNAL MEDICINE

## 2023-02-13 PROCEDURE — 82945 GLUCOSE OTHER FLUID: CPT | Performed by: HOSPITALIST

## 2023-02-13 PROCEDURE — 87070 CULTURE OTHR SPECIMN AEROBIC: CPT | Performed by: HOSPITALIST

## 2023-02-13 PROCEDURE — 25000003 PHARM REV CODE 250: Performed by: PSYCHIATRY & NEUROLOGY

## 2023-02-13 PROCEDURE — 87075 CULTR BACTERIA EXCEPT BLOOD: CPT | Performed by: HOSPITALIST

## 2023-02-13 PROCEDURE — 63600175 PHARM REV CODE 636 W HCPCS: Performed by: HOSPITALIST

## 2023-02-13 PROCEDURE — 84157 ASSAY OF PROTEIN OTHER: CPT | Performed by: HOSPITALIST

## 2023-02-13 PROCEDURE — 99233 PR SUBSEQUENT HOSPITAL CARE,LEVL III: ICD-10-PCS | Mod: ,,, | Performed by: HOSPITALIST

## 2023-02-13 PROCEDURE — 87116 MYCOBACTERIA CULTURE: CPT | Performed by: HOSPITALIST

## 2023-02-13 PROCEDURE — 80069 RENAL FUNCTION PANEL: CPT | Performed by: HOSPITALIST

## 2023-02-13 PROCEDURE — 87206 SMEAR FLUORESCENT/ACID STAI: CPT | Performed by: HOSPITALIST

## 2023-02-13 PROCEDURE — 82042 OTHER SOURCE ALBUMIN QUAN EA: CPT | Performed by: HOSPITALIST

## 2023-02-13 PROCEDURE — 25000003 PHARM REV CODE 250: Performed by: HOSPITALIST

## 2023-02-13 PROCEDURE — 80069 RENAL FUNCTION PANEL: CPT | Mod: 91 | Performed by: HOSPITALIST

## 2023-02-13 PROCEDURE — A4216 STERILE WATER/SALINE, 10 ML: HCPCS | Performed by: PSYCHIATRY & NEUROLOGY

## 2023-02-13 PROCEDURE — 97110 THERAPEUTIC EXERCISES: CPT

## 2023-02-13 PROCEDURE — 85025 COMPLETE CBC W/AUTO DIFF WBC: CPT

## 2023-02-13 PROCEDURE — 99232 SBSQ HOSP IP/OBS MODERATE 35: CPT | Mod: ,,, | Performed by: INTERNAL MEDICINE

## 2023-02-13 PROCEDURE — 83615 LACTATE (LD) (LDH) ENZYME: CPT | Mod: 91 | Performed by: HOSPITALIST

## 2023-02-13 PROCEDURE — 99024 PR POST-OP FOLLOW-UP VISIT: ICD-10-PCS | Mod: ,,, | Performed by: PHYSICIAN ASSISTANT

## 2023-02-13 PROCEDURE — 99233 SBSQ HOSP IP/OBS HIGH 50: CPT | Mod: ,,, | Performed by: HOSPITALIST

## 2023-02-13 PROCEDURE — 97112 NEUROMUSCULAR REEDUCATION: CPT

## 2023-02-13 PROCEDURE — 87205 SMEAR GRAM STAIN: CPT | Performed by: HOSPITALIST

## 2023-02-13 PROCEDURE — 25000003 PHARM REV CODE 250: Performed by: INTERNAL MEDICINE

## 2023-02-13 PROCEDURE — 25000003 PHARM REV CODE 250: Performed by: FAMILY MEDICINE

## 2023-02-13 PROCEDURE — 99223 1ST HOSP IP/OBS HIGH 75: CPT | Mod: AF,HB,, | Performed by: PSYCHIATRY & NEUROLOGY

## 2023-02-13 PROCEDURE — 89051 BODY FLUID CELL COUNT: CPT | Performed by: HOSPITALIST

## 2023-02-13 PROCEDURE — 97530 THERAPEUTIC ACTIVITIES: CPT

## 2023-02-13 RX ORDER — BUPROPION HYDROCHLORIDE 150 MG/1
150 TABLET ORAL DAILY
Status: DISCONTINUED | OUTPATIENT
Start: 2023-02-14 | End: 2023-02-28 | Stop reason: HOSPADM

## 2023-02-13 RX ORDER — LIDOCAINE HYDROCHLORIDE 10 MG/ML
INJECTION INFILTRATION; PERINEURAL
Status: DISPENSED
Start: 2023-02-13 | End: 2023-02-14

## 2023-02-13 RX ORDER — DULOXETIN HYDROCHLORIDE 30 MG/1
30 CAPSULE, DELAYED RELEASE ORAL 2 TIMES DAILY
Status: DISCONTINUED | OUTPATIENT
Start: 2023-02-13 | End: 2023-02-28 | Stop reason: HOSPADM

## 2023-02-13 RX ORDER — LIDOCAINE HYDROCHLORIDE 10 MG/ML
INJECTION INFILTRATION; PERINEURAL
Status: COMPLETED | OUTPATIENT
Start: 2023-02-13 | End: 2023-02-13

## 2023-02-13 RX ORDER — HYDROXYZINE HYDROCHLORIDE 25 MG/1
25 TABLET, FILM COATED ORAL 3 TIMES DAILY PRN
Status: DISCONTINUED | OUTPATIENT
Start: 2023-02-13 | End: 2023-02-28 | Stop reason: HOSPADM

## 2023-02-13 RX ADMIN — MEROPENEM 2 G: 1 INJECTION INTRAVENOUS at 06:02

## 2023-02-13 RX ADMIN — LIDOCAINE HYDROCHLORIDE 5 ML: 10 INJECTION, SOLUTION INFILTRATION; PERINEURAL at 03:02

## 2023-02-13 RX ADMIN — Medication 10 ML: at 12:02

## 2023-02-13 RX ADMIN — ACETAMINOPHEN 1000 MG: 500 TABLET ORAL at 08:02

## 2023-02-13 RX ADMIN — ACETAMINOPHEN 1000 MG: 500 TABLET ORAL at 09:02

## 2023-02-13 RX ADMIN — FUROSEMIDE 80 MG: 10 INJECTION, SOLUTION INTRAMUSCULAR; INTRAVENOUS at 08:02

## 2023-02-13 RX ADMIN — GABAPENTIN 900 MG: 300 CAPSULE ORAL at 08:02

## 2023-02-13 RX ADMIN — GABAPENTIN 900 MG: 300 CAPSULE ORAL at 02:02

## 2023-02-13 RX ADMIN — METHOCARBAMOL 1000 MG: 500 TABLET ORAL at 06:02

## 2023-02-13 RX ADMIN — HYDROMORPHONE HYDROCHLORIDE 0.5 MG: 1 INJECTION, SOLUTION INTRAMUSCULAR; INTRAVENOUS; SUBCUTANEOUS at 10:02

## 2023-02-13 RX ADMIN — HYDROMORPHONE HYDROCHLORIDE 0.5 MG: 1 INJECTION, SOLUTION INTRAMUSCULAR; INTRAVENOUS; SUBCUTANEOUS at 08:02

## 2023-02-13 RX ADMIN — ALPRAZOLAM 0.25 MG: 0.25 TABLET ORAL at 04:02

## 2023-02-13 RX ADMIN — DULOXETINE 30 MG: 30 CAPSULE, DELAYED RELEASE ORAL at 08:02

## 2023-02-13 RX ADMIN — BUPROPION HYDROCHLORIDE 300 MG: 300 TABLET, FILM COATED, EXTENDED RELEASE ORAL at 09:02

## 2023-02-13 RX ADMIN — HEPARIN SODIUM 5000 UNITS: 5000 INJECTION INTRAVENOUS; SUBCUTANEOUS at 10:02

## 2023-02-13 RX ADMIN — HYDROMORPHONE HYDROCHLORIDE 0.5 MG: 1 INJECTION, SOLUTION INTRAMUSCULAR; INTRAVENOUS; SUBCUTANEOUS at 02:02

## 2023-02-13 RX ADMIN — HYDROXYZINE HYDROCHLORIDE 25 MG: 25 TABLET, FILM COATED ORAL at 09:02

## 2023-02-13 RX ADMIN — LACTULOSE 20 G: 20 SOLUTION ORAL at 02:02

## 2023-02-13 RX ADMIN — HEPARIN SODIUM 5000 UNITS: 5000 INJECTION INTRAVENOUS; SUBCUTANEOUS at 06:02

## 2023-02-13 RX ADMIN — SPIRONOLACTONE 25 MG: 25 TABLET, FILM COATED ORAL at 09:02

## 2023-02-13 RX ADMIN — Medication 6 MG: at 08:02

## 2023-02-13 RX ADMIN — MEROPENEM 2 G: 1 INJECTION INTRAVENOUS at 10:02

## 2023-02-13 RX ADMIN — DRONABINOL 2.5 MG: 2.5 CAPSULE ORAL at 08:02

## 2023-02-13 RX ADMIN — HYDROMORPHONE HYDROCHLORIDE 0.5 MG: 1 INJECTION, SOLUTION INTRAMUSCULAR; INTRAVENOUS; SUBCUTANEOUS at 06:02

## 2023-02-13 RX ADMIN — FUROSEMIDE 80 MG: 10 INJECTION, SOLUTION INTRAMUSCULAR; INTRAVENOUS at 09:02

## 2023-02-13 RX ADMIN — OXYCODONE HYDROCHLORIDE 10 MG: 10 TABLET ORAL at 10:02

## 2023-02-13 RX ADMIN — OXYCODONE HYDROCHLORIDE 10 MG: 10 TABLET ORAL at 09:02

## 2023-02-13 RX ADMIN — DRONABINOL 2.5 MG: 2.5 CAPSULE ORAL at 09:02

## 2023-02-13 RX ADMIN — Medication 10 ML: at 06:02

## 2023-02-13 RX ADMIN — HYDROXYZINE HYDROCHLORIDE 25 MG: 25 TABLET, FILM COATED ORAL at 08:02

## 2023-02-13 RX ADMIN — Medication 10 ML: at 01:02

## 2023-02-13 RX ADMIN — MEROPENEM 2 G: 1 INJECTION INTRAVENOUS at 02:02

## 2023-02-13 RX ADMIN — METHOCARBAMOL 1000 MG: 500 TABLET ORAL at 01:02

## 2023-02-13 RX ADMIN — PANTOPRAZOLE SODIUM 40 MG: 40 TABLET, DELAYED RELEASE ORAL at 09:02

## 2023-02-13 RX ADMIN — HEPARIN SODIUM 5000 UNITS: 5000 INJECTION INTRAVENOUS; SUBCUTANEOUS at 01:02

## 2023-02-13 RX ADMIN — LACTULOSE 20 G: 20 SOLUTION ORAL at 09:02

## 2023-02-13 RX ADMIN — GABAPENTIN 900 MG: 300 CAPSULE ORAL at 09:02

## 2023-02-13 NOTE — PROCEDURES
Radiology Post-Procedure Note    Pre Op Diagnosis: Ascites  Post Op Diagnosis: Same    Procedure: Ultrasound Guided Paracentesis    Procedure performed by: Rachel Chauhan NP     Written Informed Consent Obtained: Yes  Specimen Removed: YES chylous  Estimated Blood Loss: Minimal    Findings:   Successful paracentesis.  Albumin administered PRN per protocol.    Patient tolerated procedure well.    Rachel Chauhan, APRN, FNP  Interventional Radiology  [phone] clinic

## 2023-02-13 NOTE — NURSING
Pt leaving unit via hospital bed for paracentesis. Resting quietly w/ even resp upon leaving unit. Room air at 95%.

## 2023-02-13 NOTE — PROGRESS NOTES
"[facility] - Telemetry Stepdown  Nephrology  Progress Note    Patient Name: Rachel Zazueta  MRN: [id number]  Admission Date: 1/19/2023  Hospital Length of Stay: 25 days  Attending Provider: Vincent Bal MD   Primary Care Physician: Dannielle Merino DO  Principal Problem:Weakness of both lower extremities    Subjective:     HPI: Per HM note: "42 y.o F w/ hx ofIV drug use (methamphetamine), chronic HCV with cirrhosis, spinal fusion (04/2021), epidural abscess with removal of hardware (02/2022), spinal fusion with hardware (T10 - Pelvis / 03/2022), obesity (BMI 39.3) and neurogenic bladder and recent shoulder surgery who initially presented to [facility] for evaluation of back pain and left leg weakness. She reports initially noting the left leg weakness when she was about to go to the bathroom and was about to fall but was assisted by her boyfriend.  She was brought to the ED via EMS. Urinalysis with UTI concerns and blood cultures at OSH grew ESBL E coli, and shoulder effusion concern for infection so she was treated with meropenem.  During hospitalization, she reports the weakness that started out in her left leg initially then spread upwards to her left thigh, left hip, then crossed over to the right hip and spread to her right leg.  Denies recent IV drug use. She reports her last incidence of drug use was more than 3 years ago and also denies tobacco, and alcohol abuse.  Reports cannabis use.     OSH course notable for concerning urinalysis and blood cultures positive for ESBL E coli treated with meropenem.  She was also admit to the ICU where she was treated with Precedex for significant body aches, irritability, and muscle spasms.  Concerns of a murmur on physical exam so she underwent TTE which did not show any vegetations.  Worsening lower extremity weakness workup with MRI head nonspecific, MRI cervical spine with multilevel spondylosis, X-ray spine with multilevel thoracolumbar fusion and " "diskectomy, focal kyphosis at T9-10 increased compared to prior.  She was started on prophylaxis amoxicillin due to her history of infected hardware.  MRI spine unable to be completed due to patient's body habitus so she was transferred to [facility] for further imaging and Neurosurgery, Neurology evaluation." Underwent laminectomy, posterior fusion and washout on 1/24. ID following for discitis, has her on meropenem. Stepped down to  on 2/8.     Nephrology consulted for hyponatremia. Patient reports uncomfortable abdominal distension and leg swelling. Reports drinking 3-4 cups of her 30oz water tumbler daily. Urine Na <10, urine osm 513. Serum Na trend 136--> 132-->131-->129-->127-->135-->134-->127. Normal mentation. Cr/BUN wnl. Albumin 1.8, protein 4.5, bili 1.4.          Interval History: seen and assessed at bedside, noted to have 4 one liter containers on table, grapes (full 1 liter cup), chips and 5 coke bottles at bedside. Re-enforced idea of fluid restriction to help stop worsening hyponatremia and that we are continuing lasix to help with naturiesis    Review of patient's allergies indicates:   Allergen Reactions    Bee venom protein (honey bee) Anaphylaxis     Patient reports she is allergic to bee stings.    Naproxen Anaphylaxis     Throat closing    12-23- Patient reports taking Ibuprofen 200 mg at home without problems. Verified X3. [place]    Wasp sting [allergen ext-venom-honey bee] Anaphylaxis    Adhesive Blisters    Iodine and iodide containing products Hives     Allergic to iodine in seafood only    Shellfish containing products     Nuts [tree nut] Rash     Current Facility-Administered Medications   Medication Frequency    0.9%  NaCl infusion (for blood administration) Q24H PRN    0.9%  NaCl infusion (for blood administration) Q24H PRN    acetaminophen tablet 1,000 mg Q12H    ALPRAZolam tablet 0.25 mg BID PRN    bisacodyL suppository 10 mg Every other day    buPROPion TB24 tablet 300 mg Daily "    dextrose 10% bolus 125 mL 125 mL PRN    dextrose 10% bolus 250 mL 250 mL PRN    dronabinoL capsule 2.5 mg BID    furosemide injection 80 mg Q12H    gabapentin capsule 900 mg TID    glucagon (human recombinant) injection 1 mg PRN    glucose chewable tablet 16 g PRN    glucose chewable tablet 24 g PRN    heparin (porcine) injection 5,000 Units Q8H    hydrALAZINE tablet 25 mg Q8H PRN    HYDROmorphone injection 0.5 mg Q4H PRN    hydrOXYzine HCL tablet 25 mg TID PRN    lactulose 20 gram/30 mL solution Soln 20 g TID    magnesium hydroxide 400 mg/5 ml suspension 2,400 mg Daily PRN    melatonin tablet 6 mg Nightly PRN    meropenem (MERREM) 2 g in sodium chloride 0.9% 100 mL IVPB Q8H    methocarbamoL tablet 1,000 mg Q6H    naloxone 0.4 mg/mL injection 0.02 mg PRN    ondansetron injection 4 mg Q6H PRN    oxyCODONE immediate release tablet 5 mg Q3H PRN    And    oxyCODONE immediate release tablet Tab 10 mg Q3H PRN    pantoprazole EC tablet 40 mg Daily    polyethylene glycol packet 17 g Daily PRN    prochlorperazine injection Soln 2.5 mg Q6H PRN    senna-docusate 8.6-50 mg per tablet 1 tablet BID PRN    simethicone chewable tablet 80 mg TID PRN    sodium chloride 0.9% flush 10 mL Q12H PRN    sodium chloride 0.9% flush 10 mL Q6H    And    sodium chloride 0.9% flush 10 mL PRN    spironolactone tablet 25 mg Daily       Objective:     Vital Signs (Most Recent):  Temp: 97.2 °F (36.2 °C) (02/13/23 1200)  Pulse: (!) 118 (02/13/23 1057)  Resp: 16 (02/13/23 1024)  BP: (!) 119/56 (02/13/23 0934)  SpO2: 100 % (02/13/23 0800)   Vital Signs (24h Range):  Temp:  [97.2 °F (36.2 °C)-98.9 °F (37.2 °C)] 97.2 °F (36.2 °C)  Pulse:  [109-118] 118  Resp:  [16-20] 16  SpO2:  [96 %-100 %] 100 %  BP: ()/(39-62) 119/56     Weight: (!) 140.5 kg (309 lb 11.9 oz) (02/10/23 0600)  Body mass index is 48.51 kg/m².  Body surface area is 2.58 meters squared.    I/O last 3 completed shifts:  In: -   Out: 1655 [Urine:650;  Drains:1005]    Physical Exam  Constitutional:       General: She is not in acute distress.     Appearance: Normal appearance. She is obese. She is not ill-appearing.   HENT:      Head: Normocephalic and atraumatic.   Eyes:      General: No scleral icterus.  Cardiovascular:      Rate and Rhythm: Normal rate.      Pulses: Normal pulses.   Pulmonary:      Effort: Pulmonary effort is normal.   Abdominal:      General: There is distension.      Tenderness: There is abdominal tenderness. There is no guarding.   Musculoskeletal:      Right lower leg: Edema present.      Left lower leg: Edema present.   Skin:     General: Skin is warm and dry.      Coloration: Skin is not jaundiced.   Neurological:      Mental Status: She is alert and oriented to person, place, and time.   Psychiatric:         Behavior: Behavior normal.       Significant Labs:  All labs within the past 24 hours have been reviewed.     Significant Imaging:  Labs: Reviewed    Assessment/Plan:     Hyponatremia  42 y.o F w/ hx of IV drug use (meth), chronic HCV with cirrhosis, spinal fusion (04/2021), epidural abscess with removal of hardware (02/2022), spinal fusion with hardware (T10 - Pelvis / 03/2022), and neurogenic bladder here with complicated ESBL E.coli epidural abscess s/p washout,corpectomy, fixation being treated w/ meropenem.     Nephrology consulted for hyponatremia. Patient reports uncomfortable abdominal distension and leg swelling. Reports drinking 3-4 cups of her 30oz water tumbler daily. Urine Na <10, urine osm 513. Serum osm 283. Serum Na stable. Normal mentation.    Hyponatremia likely 2/2 to cirrhosis and hypervolemia. Possible poor solute intake relative to free water intake. Unlikely SIADH given low urine Na.     - Fluid restrict to 1 L daily to avoid worsening of hyponatremia. Patient currently not compliant w/ fluid restriction despite counseling.   - Lasix IV 80 BID, continue spironolactone 25mg daily  - Asymptomatic, no role for  hypertonic saline  - Goal increase sodium 3-6 mM/24h  - q12h Na check      Chronic hepatitis C with cirrhosis  Will need to f/u with outpatient hepatology for antiviral treatment    Cirrhosis of liver with ascites  S/p para on 2/10  On lactulose, lasix and aldactone  Rest of management per primary        Thank you for your consult. I will follow-up with patient. Please contact us if you have any additional questions.    Spencer Khan MD  Nephrology  [facility] - Telemetry Stepdown

## 2023-02-13 NOTE — NURSING
End of shift note    AAOx4  RA  Pure wick replaced  Dilaudid PRN  Oxy PRN  VSS  NADN- safety checks performed  Call light in place

## 2023-02-13 NOTE — PROGRESS NOTES
[facility] - Telemetry [facility]  Progress Note    Patient Name: Rachel Zazueta  MRN: [id number]  Patient Class: IP- Inpatient   Admission Date: 1/19/2023  Length of Stay: 25 days  Attending Physician: Vincent Bal MD  Primary Care Provider: Dannielle Merino DO        Subjective:     Principal Problem:Weakness of both lower extremities        HPI:  Ms. Zazueta is a 42 y.o F w/ hx ofIV drug use (methamphetamine), chronic HCV with cirrhosis, spinal fusion (04/2021), epidural abscess with removal of hardware (02/2022), spinal fusion with hardware (T10 - Pelvis / 03/2022), obesity (BMI 39.3) and neurogenic bladder who initially presented to [facility] for evaluation of back pain and left leg weakness.  She reports initially noting the left leg weakness when she was about to go to the bathroom and was about to fall but was assisted by her boyfriend.  She was brought to the ED via EMS.  Urinalysis with UTI concerns and blood cultures at OSH grew ESBL E coli so she was treated with meropenem.  During hospitalization, she reports the weakness that started out in her left leg initially then spread upwards to her left thigh, left hip, then crossed over to the right hip and spread to her right leg.  Denies recent IV drug use. She reports her last incidence of drug use was more than 3 years ago and also denies tobacco, and alcohol abuse.  Reports cannabis use.    She also reports more than 10 years ago she had an episode of a headache that lasted about 4 days and was associated with residual defects of a strabismus in the left eye with associated visual disturbances.  She also reports over the past few years her friends have noticed that she sometimes has word-finding difficulty during conversations.     Reports shortness of breath when sitting up, fever, chills, back pain, lower extremity weakness(initally L>R, but now R>L), diarrhea.  Denies cough, nausea, vomiting, constipation, bladder or  bowel incontinence, dysuria, dark urine, new vision changes.    OSH course notable for concerning urinalysis and blood cultures positive for ESBL E coli treated with meropenem.  She was also admit to the ICU where she was treated with Precedex for significant body aches, irritability, and muscle spasms.  Concerns of a murmur on physical exam so she underwent TTE which did not show any vegetations.  Worsening lower extremity weakness workup with MRI head nonspecific, MRI cervical spine with multilevel spondylosis, X-ray spine with multilevel thoracolumbar fusion and diskectomy, focal kyphosis at T9-10 increased compared to prior.  She was started on prophylaxis amoxicillin due to her history of infected hardware.  MRI spine unable to be completed due to patient's body habitus so she was transferred to [facility] for further imaging and Neurosurgery, Neurology evaluation.      Overview/Hospital Course:  Pt admitted as a transfer from [facility] for worsening LE weakness, concern for spinal infection, and need for MRI which could not be done at OSH. Imaging was completed here. Worsening proximal junctional kyphosis noted at T9-10 noted with concern for discitis at T8-9, T9-10. NSGY evaluated.     Found to have T8-T10 kyphosis on MRI. 1/24 underwent laminectomy T8-T10; posterior fusion T8-T12; and washout. 2/2 underwent T4-pelvis fusion and T9-T10 corpectomy.  ID consulted for thoracic discitis infection.  Patient to complete 8 weeks of therapy with meropenem. Patient underwent flap closure with Plastic surgery.  [facility] on 02/08.    2/9   On meropenem for MDR-ESCHERICHIA COLI ESBL  sodium trended down to 129.  Neurosurgery following post OR and aiding in pain management. PCA discontinued 2/8 . On oxycodone 10mg PO q4h . requiring IV dilaudid q4 as needed,. drains to gravity output 990ml/24h . Plastic surgery following flap, managing wounds.  Wound VAC discontinued on Wednesday.  accepted to [facility] pain  management consulted for back ache- switched to multimodal therapy. sodium trended down to 127.   2/10 sodium stable at 127 . continues with increased drain output 985ml/24h . negative balance of  2.8 L lasix on hold.  nephrology consulted. abdominal X ray -  Nonobstructive bowel gas pattern.  No free air.  As before, mild gaseous distension of stomach. ammonia at 57 . sono abdomen  . tylenol changed to 1000mg q12h with cirrhosis . sono abdomen -Cirrhotic liver morphology.  Sequela of portal hypertension including moderate ascites, recanalized umbilical vein.  No focal hepatic lesions. Cholecystectomy. . Nephrology recs  fluid restriction to 1 L,  lasix 60 mg IV daily,  and trend sodium q12h.  diazepam discontinued . s/p IR paracentesis today   2/11no evidence of SBP on paracentesis. 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 AFB and Triglyerides on ascitic fluid.  noncompliant with  fluid restriction to 1 L despite counseling. sodium trended down to 126 .received 2 doses of IV lasix 60mg. drain output 250ml. Increased Lasix IV 80 BID  due to UOP 500ml/24h   2/12 UOP of 1250ml/24h. K and P replaced. sodium trended up to 129.  hepatology eval -If ascitic fluid triglyceride level is elevated, needs IR  eval for lymphangiogram and surgery eval for  ymphatic repair. xanax 0.25mg BID PRN for anxiety   2/13 s/p paracentesis. Removed 4,700 ml. DVT sonogram LE negative. s/p psychiatry eval  for anxiety/depression. xanax discontinued. Decreased Bupropion to 150 mg PO Daily . Started Duloxetine 30 mg PO BID and  PRN Hydroxyzine 25 mg PO q8h for anxiety        Review of Systems:   Pain scale: 6/10   Constitutional:  fever,  chills, headache, vision loss, hearing loss, weight loss, Generalized weakness, falls, loss of smell, loss of taste, poor appetite,  sore throat  Respiratory: cough, shortness of breath.   Cardiovascular: chest pain, dizziness, palpitations, orthopnea,  swelling of feet, syncope  Gastrointestinal: nausea, vomiting, abdominal pain-improved  diarrhea, black stool,  blood in stool, change in bowel habits  Genitourinary: hematuria, dysuria, urgency, frequency  Integument/Breast: rash,  pruritis,  Surgical wound - back   Hematologic/Lymphatic: easy bruising, lymphadenopathy  Musculoskeletal: arthralgias , myalgias, back pain, neck pain, knee pain  Neurological: confusion, seizures, tremors, slurred speech  Behavioral/Psych:  depression, anxiety, auditory or visual hallucinations     OBJECTIVE:     Physical Exam:  Body mass index is 48.51 kg/m².    Constitutional: Appears well-developed and well-nourished. severe obesity  Head: Normocephalic and atraumatic.   Neck: Normal range of motion. Neck supple.   Cardiovascular: Normal heart rate.  Regular heart rhythm.  Pulmonary/Chest: Effort normal.   Abdominal:+  distension.  No tenderness  Musculoskeletal: Normal range of motion. ++ edema. drains in place   Neurological: Alert and oriented to person, place, and time. LE strength - 3-4/5   Skin: Skin is warm and dry.   Psychiatric: Normal mood and affect. Behavior is normal.                  Vital Signs  Temp: 98.4 °F (36.9 °C) (02/13/23 1521)  Pulse: 109 (02/13/23 1626)  Resp: 14 (02/13/23 1626)  BP: (Abnormal) 95/54 (02/13/23 1626)  SpO2: 96 % (02/13/23 1626)     24 Hour VS Range    Temp:  [97.2 °F (36.2 °C)-98.6 °F (37 °C)]   Pulse:  [104-118]   Resp:  [14-20]   BP: ()/(54-62)   SpO2:  [96 %-100 %]     Intake/Output Summary (Last 24 hours) at 2/13/2023 1722  Last data filed at 2/13/2023 0717  Gross per 24 hour   Intake no documentation   Output 587 ml   Net -587 ml         I/O This Shift:  I/O this shift:  In: -   Out: 2 [Stool:2]    Wt Readings from Last 3 Encounters:   02/10/23 (Abnormal) 140.5 kg (309 lb 11.9 oz)   01/19/23 110.6 kg (243 lb 12.8 oz)   01/19/23 110.6 kg (243 lb 13.3 oz)       I have personally reviewed the vitals and recorded Intake/Output      Laboratory/Diagnostic Data:    CBC/Anemia Labs: Coags:    Recent Labs   Lab 02/11/23  0430 02/12/23  0528 02/13/23  0500   WBC 4.34 5.01 4.85   HGB 7.4* 7.7* 7.8*   HCT 24.1* 24.6* 24.9*   * 114* 112*   MCV 93 93 92   RDW 20.1* 19.6* 19.6*    No results for input(s): PT, INR, APTT in the last 168 hours.       Chemistries: ABG:   Recent Labs   Lab 02/11/23  0430 02/11/23  1144 02/11/23  1857 02/12/23  0528 02/12/23  0535 02/13/23  0500   *   < > 128* 129*  129*  --  127*  128*   K 3.7   < > 3.8 3.4*  3.5  --  4.0  4.0   CL 97   < > 97 99  100  --  97  97   CO2 23   < > 22* 21*  21*  --  23  26   BUN 24*   < > 26* 27*  27*  --  29*  29*   CREATININE 0.6   < > 0.6 0.5  0.5  --  0.6  0.6   CALCIUM 7.7*   < > 7.8* 7.2*  7.1*  --  7.4*  7.6*   PROT 5.0*  --   --  4.9*  --  5.0*   BILITOT 1.4*  --   --  1.2*  --  1.2*   ALKPHOS 141*  --   --  141*  --  148*   ALT 24  --   --  23  --  26   AST 35  --   --  38  --  40   MG 2.1  --   --  1.8  --  1.9   PHOS 2.9   < > 2.7 2.6* 2.6* 2.9  3.0    < > = values in this interval not displayed.    No results for input(s): PH, PCO2, PO2, HCO3, POCSATURATED, BE in the last 168 hours.       POCT Glucose: HbA1c:    No results for input(s): POCTGLUCOSE in the last 168 hours. Hemoglobin A1C   Date Value Ref Range Status   04/16/2021 4.6 4.0 - 5.6 % Final     Comment:     ADA Screening Guidelines:  5.7-6.4%  Consistent with prediabetes  >or=6.5%  Consistent with diabetes    High levels of fetal hemoglobin interfere with the HbA1C  assay. Heterozygous hemoglobin variants (HbS, HgC, etc)do  not significantly interfere with this assay.   However, presence of multiple variants may affect accuracy.     05/14/2019 4.2 4.0 - 5.6 % Final     Comment:     ADA Screening Guidelines:  5.7-6.4%  Consistent with prediabetes  >or=6.5%  Consistent with diabetes  High levels of fetal hemoglobin interfere with the HbA1C  assay. Heterozygous hemoglobin variants (HbS, HgC,  etc)do  not significantly interfere with this assay.   However, presence of multiple variants may affect accuracy.     11/24/2018 4.2 4.0 - 5.6 % Final     Comment:     ADA Screening Guidelines:  5.7-6.4%  Consistent with prediabetes  >or=6.5%  Consistent with diabetes  High levels of fetal hemoglobin interfere with the HbA1C  assay. Heterozygous hemoglobin variants (HbS, HgC, etc)do  not significantly interfere with this assay.   However, presence of multiple variants may affect accuracy.          Cardiac Enzymes: Ejection Fractions:    No results for input(s): CPK, CPKMB, MB, TROPONINI in the last 72 hours.   EF   Date Value Ref Range Status   01/20/2023 58 % Final   12/23/2022 64 % Final          No results for input(s): COLORU, APPEARANCEUA, PHUR, SPECGRAV, PROTEINUA, GLUCUA, KETONESU, BILIRUBINUA, OCCULTUA, NITRITE, UROBILINOGEN, LEUKOCYTESUR, RBCUA, WBCUA, BACTERIA, SQUAMEPITHEL, HYALINECASTS in the last 48 hours.    Invalid input(s): WRIGHTSUR    Procalcitonin (ng/mL)   Date Value   02/14/2022 0.05   02/14/2022 0.06   04/14/2021 0.04   11/24/2018 0.37 (H)     Lactate (Lactic Acid) (mmol/L)   Date Value   01/10/2023 2.0   12/23/2022 1.8   12/23/2022 3.6 (HH)   11/14/2022 1.4   02/14/2022 1.1     BNP (pg/mL)   Date Value   11/24/2018 90   03/14/2017 87     CRP   Date Value   01/20/2023 9.4 mg/L (H)   01/10/2023 3.47 mg/dL (H)   11/16/2022 45.9 mg/L (H)   11/14/2022 11.10 mg/dL (H)   07/23/2022 1.93 mg/dL (H)   07/01/2022 1.53 mg/dL (H)   02/24/2022 7.1 mg/L   02/14/2022 29.2 mg/L (H)   02/14/2022 30.8 mg/L (H)   04/14/2021 47.5 mg/L (H)     Sed Rate (mm/Hr)   Date Value   01/20/2023 70 (H)   01/10/2023 55 (H)   11/16/2022 84 (H)   11/14/2022 67 (H)   02/24/2022 8   02/14/2022 57 (H)   02/14/2022 82 (H)   04/14/2021 57 (H)   05/13/2019 35 (H)   03/21/2017 21 (H)     No results found for: DDIMER  Ferritin (ng/mL)   Date Value   02/07/2022 126   03/21/2017 52   03/14/2017 67   12/06/2016 80     LD (U/L)   Date  Value   04/24/2021 250   03/14/2017 253     Troponin I (ng/mL)   Date Value   02/08/2023 <0.006   11/24/2018 <0.006   03/20/2017 0.01   03/14/2017 <0.01     CPK (U/L)   Date Value   01/20/2023 48   02/14/2022 64   03/15/2017 44     No results found for this or any previous visit.  SARS-CoV2 (COVID-19) Qualitative PCR (no units)   Date Value   03/14/2022 Not Detected   02/20/2022 Not Detected   04/27/2021 Not Detected   04/22/2021 Not Detected     SARS-CoV-2 RNA, Amplification, Qual (no units)   Date Value   03/02/2022 Negative     POC Rapid COVID (no units)   Date Value   12/23/2022 Negative   12/10/2022 Negative   02/14/2022 Negative   04/14/2021 Negative       Microbiology labs for the last week  Microbiology Results (last 7 days)       Procedure Component Value Units Date/Time    AFB Culture & Smear [[id number]] Collected: 02/13/23 1529    Order Status: Sent Specimen: Ascites Updated: 02/13/23 1619    Gram stain [[id number]] Collected: 02/13/23 1529    Order Status: Sent Specimen: Ascites Updated: 02/13/23 1619    Aerobic culture [[id number]] Collected: 02/13/23 1529    Order Status: Sent Specimen: Ascites Updated: 02/13/23 1619    Culture, Anaerobic [[id number]] Collected: 02/13/23 1529    Order Status: Sent Specimen: Ascites Updated: 02/13/23 1618    Fungus culture [[id number]] Collected: 02/13/23 1529    Order Status: Sent Specimen: Ascites Updated: 02/13/23 1618    Aerobic culture [[id number]] Collected: 02/10/23 1430    Order Status: Completed Specimen: Ascites Updated: 02/13/23 0830     Aerobic Bacterial Culture No growth    Culture, Anaerobic [[id number]] Collected: 02/10/23 1430    Order Status: Completed Specimen: Ascites Updated: 02/12/23 1037     Anaerobic Culture Culture in progress    AFB Culture & Smear [[id number]]     Order Status: Completed Specimen: Ascites     Gram stain [[id number]] Collected: 02/10/23 1430    Order Status: Completed Specimen: Ascites Updated: 02/11/23 0333     Gram Stain Result No  WBC's      No organisms seen    Fungus culture [[id number]] Collected: 01/24/23 0913    Order Status: Completed Specimen: Body Fluid from Back Updated: 02/08/23 0655     Fungus (Mycology) Culture Culture in progress      No fungus isolated after 2 weeks    Narrative:      1) Perispinal    Fungus culture [[id number]] Collected: 01/24/23 0943    Order Status: Completed Specimen: Bone from Back Updated: 02/08/23 0655     Fungus (Mycology) Culture Culture in progress      No fungus isolated after 2 weeks    Narrative:      3) Perispinal    Fungus culture [[id number]] Collected: 01/24/23 0943    Order Status: Completed Specimen: Bone from Back Updated: 02/08/23 0655     Fungus (Mycology) Culture Culture in progress      No fungus isolated after 2 weeks    Narrative:      4) Perispinal    Fungus culture [[id number]] Collected: 01/24/23 0913    Order Status: Completed Specimen: Body Fluid from Back Updated: 02/08/23 0655     Fungus (Mycology) Culture Culture in progress      No fungus isolated after 2 weeks    Narrative:      2) Perispinal            Reviewed and noted in plan where applicable- Please see chart for full lab data.    Lines/Drains:  PICC Double Lumen 01/26/23 1209 left basilic (Active)   Line Necessity Review Longterm central access required 02/08/23 1948   Verification by X-ray Yes 02/08/23 1948   Site Assessment No warmth;No swelling 02/08/23 1948   Extremity Assessment Distal to IV No abnormal discoloration 02/08/23 1948   Line Securement Device Secured with sutureless device 02/08/23 0730   Dressing Type Biopatch in place 02/08/23 1948   Dressing Status Clean;Dry;Intact 02/08/23 1948   Dressing Intervention Integrity maintained 02/08/23 1948   Date on Dressing 02/06/23 02/08/23 1948   Dressing Due to be Changed 02/13/23 02/08/23 1948   Distal Patency/Care flushed w/o difficulty 02/08/23 1948   Proximal 1 Patency/Care flushed w/o difficulty 02/08/23 1948   Current Insertion Depth (cm) 39 cm 01/26/23 1207    Current Exposed Catheter (cm) 0 cm 01/26/23 1207   Extremity Circumference (cm) 35 cm 01/26/23 1207   Waveform Not being transduced 02/08/23 0730   Number of days: 13            Midline Catheter Insertion/Assessment  - Single Lumen 01/12/23 2138 Right basilic vein (medial side of arm) 18g x 10cm (Active)   $ Midline Charges (Upon insertion) Bedside Insertion Performed Pt > 3 Years Old;Midline Catheter (Supply);Ultrasound Guide for Vascular Access 01/12/23 2142   Site Assessment Clean;Dry;Intact 02/08/23 1948   IV Device Securement catheter securement device 02/08/23 1948   Line Status Saline locked 02/08/23 1948   Dressing Type Biopatch in place 02/08/23 1948   Dressing Status Clean;Dry;Intact 02/08/23 1948   Dressing Intervention Integrity maintained 02/08/23 0730   Dressing Change Due 02/06/23 02/08/23 0730   Site Change Due 02/13/23 02/08/23 0730   Reason Not Rotated Not due 02/08/23 0730   Number of days: 27            Closed/Suction Drain 02/02/23 1401 Right Back Bulb 15 Fr. (Active)   Site Description Healing 02/08/23 1948   Dressing Type Transparent (Tegaderm) 02/08/23 1948   Dressing Status Clean;Dry;Intact 02/08/23 1948   Dressing Intervention Integrity maintained 02/08/23 1948   Drainage Serosanguineous 02/08/23 1948   Status Open to gravity drainage 02/08/23 1948   Output (mL) 10 mL 02/09/23 0530   Number of days: 6            Closed/Suction Drain 02/02/23 1402 Right Back Bulb 15 Fr. (Active)   Site Description Healing 02/08/23 1948   Dressing Type Transparent (Tegaderm) 02/08/23 1948   Dressing Status Clean;Dry;Intact 02/08/23 1948   Dressing Intervention Integrity maintained 02/08/23 1948   Drainage Serosanguineous 02/08/23 1948   Status Open to gravity drainage 02/08/23 1948   Output (mL) 30 mL 02/09/23 0530   Number of days: 6            Closed/Suction Drain 02/02/23 1402 Left Back Bulb 15 Fr. (Active)   Site Description Healing 02/08/23 1948   Dressing Type Transparent (Tegaderm) 02/08/23 1948    Dressing Status Clean;Dry;Intact 02/08/23 1948   Dressing Intervention Integrity maintained 02/08/23 1948   Drainage Serosanguineous 02/08/23 1948   Status Open to gravity drainage 02/08/23 1948   Output (mL) 15 mL 02/09/23 0530   Number of days: 6            Closed/Suction Drain 02/02/23 1402 Left Back Bulb 15 Fr. (Active)   Site Description Healing 02/08/23 1948   Dressing Type Transparent (Tegaderm) 02/08/23 1948   Dressing Status Clean;Dry;Intact 02/08/23 1948   Dressing Intervention Integrity maintained 02/08/23 1948   Drainage Serosanguineous 02/08/23 1948   Status Open to gravity drainage 02/08/23 1948   Output (mL) 10 mL 02/09/23 0530   Number of days: 6       Imaging      Results for orders placed during the hospital encounter of 01/19/23    Echo Saline Bubble? Yes    Interpretation Summary  · Study is negative for intercardiac shunt that is right to left ( see text).  · The left ventricle is normal in size with concentric remodeling and normal systolic function.  · The estimated ejection fraction is 58%.  · There are segmental left ventricular wall motion abnormalities.  · Normal left ventricular diastolic function.  · Normal right ventricular size with normal right ventricular systolic function.  · Mild right atrial enlargement.  · Normal central venous pressure (3 mmHg).  · The estimated PA systolic pressure is 26 mmHg.      IR Paracentesis with Imaging  Narrative: EXAMINATION:  Ultrasound-guided paracentesis    Procedural Personnel    Attending physician(s): Guillermo Moore MD    Fellow physician(s): None    Resident physician(s): None    Advanced practice provider(s): CÉSAR Nelson, HENRI    Pre-procedure diagnosis: Ascites    Post-procedure diagnosis: Same    Complications: No immediate complications.    CLINICAL HISTORY:  Recurrent Ascites    TECHNIQUE:  - Ultrasound-guided paracentesis    COMPARISON:  Paracentesis 2/10/2023    FINDINGS:  Pre-procedure    Consent: Informed consent for the  procedure was obtained and time-out was performed prior to the procedure.    Preparation: The site was prepared and draped using maximal sterile barrier technique including cutaneous antisepsis.    Anesthesia/sedation    Level of anesthesia/sedation: No sedation    Anesthesia/sedation administered by: Not applicable    Total intra-service sedation time (minutes): 0    Limited abdominal ultrasound    Limited abdominal ultrasound was performed.    Moderate ascites. A safe window for paracentesis was identified.    Paracentesis    Local anesthesia was administered. The peritoneal cavity was accessed on the right lower quadrant and fluid return confirmed position. Ascites was drained.    Paracentesis access technique: Real-time ultrasound guidance    Catheter placed: 5F Yueh    Closure    The catheter was removed. A sterile bandage was applied.    Post-drainage ultrasound: Not performed    Additional Details    Additional description of procedure: None    Equipment details: None    Specimens removed: Abdominal fluid    Estimated blood loss (mL): Less than 10    Standardized report: [email or web address]    Attestation    Signer name:    I attest that I reviewed the stored images and agree with the report as written.  Impression: Ultrasound-guided paracentesis with drainage of 4700 mL of serous fluid.    _______________________________________________________________    Electronically signed by resident: Rachel Chauhan NP  Date:    02/13/2023  Time:    16:49  US Lower Extremity Veins Bilateral  Narrative: EXAMINATION:  US LOWER EXTREMITY VEINS BILATERAL    CLINICAL HISTORY:  r/O DVT;    TECHNIQUE:  Duplex and color flow Doppler and dynamic compression was performed of the bilateral lower extremity veins was performed.    COMPARISON:  None    FINDINGS:  Right thigh veins: The common femoral, femoral, popliteal, and upper greater saphenous veins are patent and free of thrombus. The veins are normally compressible and have  normal phasic flow and augmentation response.    Right calf veins: The visualized calf veins are patent.    Left thigh veins: The common femoral, femoral, popliteal, and upper greater saphenous are patent and free of thrombus. The veins are normally compressible.  Monophasic waveforms are noted throughout the left lower extremity venous system.    Left calf veins: The visualized calf veins are patent.    Miscellaneous: Nonspecific Anechoic fluid collection noted adjacent to the left iliac vein.  Impression: No evidence of deep venous thrombosis in either lower extremity.    Nonspecific fluid collection adjacent to the left iliac vein.  Recommend further evaluation with contrast enhanced CT.    This report was flagged in Epic as abnormal.    Electronically signed by resident: Irma Flanagan  Date:    02/13/2023  Time:    15:38    Electronically signed by: Alex Meade  Date:    02/13/2023  Time:    16:17  X-Ray Chest 1 View  Narrative: EXAMINATION:  XR CHEST 1 VIEW    CLINICAL HISTORY:  hypoxia;    TECHNIQUE:  Single frontal view of the chest was performed.    COMPARISON:  01/26/2023    FINDINGS:  Interval removal right-sided central venous catheter with left-sided PICC line unchanged.  Numerous surgical skin staples project over the right thorax with revision of thoracal lumbar hardware with increased pedicular screws spinal rods now with lower thoracic corpectomy strut graft.  Small lung volumes likely related to poor inspiratory effort.  There is no new lung opacity.  No large pleural effusion pneumothorax.  Presumed external monitoring leads overlie the thorax.  Clinical correlation and follow-up advised.  Further evaluation as warranted clinically.  Impression: Please see above    Electronically signed by: Gold Cardenas DO  Date:    02/13/2023  Time:    08:42      Labs, Imaging, EKG and Diagnostic results from 2/13/2023 were reviewed.    Medications:  Medication list was reviewed and changes noted under  Assessment/Plan.  No current facility-administered medications on file prior to encounter.     Current Outpatient Medications on File Prior to Encounter   Medication Sig Dispense Refill    albuterol (ACCUNEB) 1.25 mg/3 mL Nebu Take 3 mLs (1.25 mg total) by nebulization every 6 (six) hours as needed (shortness of breath). Rescue 75 mL 0    buPROPion (WELLBUTRIN) 100 MG tablet Take 1 tablet (100 mg total) by mouth 2 (two) times daily. 60 tablet 2    folic acid (FOLVITE) 1 MG tablet Take 1 tablet (1 mg total) by mouth once daily. (Patient not taking: Reported on 11/25/2022) 42 tablet 0    gabapentin (NEURONTIN) 300 MG capsule Take 1 capsule (300 mg total) by mouth 3 (three) times daily. 90 capsule 11    lactulose (CHRONULAC) 20 gram/30 mL Soln Take 30 mLs (20 g total) by mouth 3 (three) times daily. 2700 mL 2    pantoprazole (PROTONIX) 40 MG tablet Take 1 tablet (40 mg total) by mouth once daily. 30 tablet 1    [DISCONTINUED] diclofenac sodium (VOLTAREN) 1 % Gel Apply 2 g topically 4 (four) times daily. 50 g 0     Scheduled Medications:  acetaminophen, 1,000 mg, Oral, Q12H  bisacodyL, 10 mg, Rectal, Every other day  [START ON 2/14/2023] buPROPion, 150 mg, Oral, Daily  dronabinoL, 2.5 mg, Oral, BID  DULoxetine, 30 mg, Oral, BID  furosemide (LASIX) injection, 80 mg, Intravenous, Q12H  gabapentin, 900 mg, Oral, TID  heparin (porcine), 5,000 Units, Subcutaneous, Q8H  lactulose, 20 g, Oral, TID  LIDOcaine HCL 10 mg/ml (1%), , ,   meropenem (MERREM) IVPB, 2 g, Intravenous, Q8H  methocarbamoL, 1,000 mg, Oral, Q6H  pantoprazole, 40 mg, Oral, Daily  sodium chloride 0.9%, 10 mL, Intravenous, Q6H  spironolactone, 25 mg, Oral, Daily      PRN: sodium chloride, sodium chloride, dextrose 10%, dextrose 10%, glucagon (human recombinant), glucose, glucose, hydrALAZINE, HYDROmorphone, hydrOXYzine HCL, magnesium hydroxide 400 mg/5 ml, melatonin, naloxone, ondansetron, oxyCODONE **AND** oxyCODONE, polyethylene glycol, prochlorperazine,  senna-docusate 8.6-50 mg, simethicone, sodium chloride 0.9%, Flushing PICC Protocol **AND** sodium chloride 0.9% **AND** sodium chloride 0.9%  Infusions:   Estimated Creatinine Clearance: 179.7 mL/min (based on SCr of 0.6 mg/dL).    Assessment/Plan:      * Weakness of both lower extremities  42 year old woman s/p multiple spinal surgeries presented to OSH with possible infection of shoulder. Found to have UTI and E. Coli bacteremia and progressively worse BLE weakness. [place] [facility] [place]. Underwent Laminectomy T8-T10; Posterior spinal fusion T8-T12; hardware removal and washout on 1/24. Admitted to [facility] postop. On 2/2 underwent T4-pelvis fusion and T9-T10 corpectomies. To complete 8 week course of meropenem per ID for ESBL E coli from bone culture, end date 3/29.     - neuro checks q4h  - HV drains x 2 in place, management per NSGY  - meropenem for ESBL bacteremia per ID, eot 3/29/23  - Plastic surgery follow, wound vac discontinued  - CMP, Mag, Phos, CBC daily  - MAP goals >65, SBP < 180  - PRN labetalol, hydralazine  - MM pain regimen: PRN Dilaudid, Tylenol, gabapentin, robaxin, PRN oxycodone  - Aggressive bowel regimen  - PT/OT   2/9   On meropenem for MDR-ESCHERICHIA COLI ESBL  sodium trended down to 129.  Neurosurgery following post OR and aiding in pain management. PCA discontinued 2/8 . On oxycodone 10mg PO q4h . requiring IV dilaudid q4 as needed,. drains to gravity output 990ml/24h . Plastic surgery following flap, managing wounds.  Wound VAC discontinued on Wednesday.  accepted to [facility]   2/11 needs post op xrays when able to stand or sit, prior to discharge    Ascites due to alcoholic cirrhosis  2/10  sono abdomen -Cirrhotic liver morphology.  Sequela of portal hypertension including moderate ascites, recanalized umbilical vein.  No focal hepatic lesions. Cholecystectomy. s/p IR paracentesis as above  2/11no evidence of SBP on paracentesis. hepatology consulted for  Ultrasound-guided paracentesis with drainage of 5000 mL of chylous fluid. AFB and Triglyerides on ascitic fluid.    2/12  hepatology eval -If ascitic fluid triglyceride level is elevated, needs IR  eval for lymphangiogram and surgery eval for  lymphatic repair      Abdominal pain  2/10  abdominal X ray -  Nonobstructive bowel gas pattern.  No free air.  As before, mild gaseous distension of stomach. ammonia at 57 . sono abdomen with ascitis . simethicone PRN  2/11no evidence of SBP on paracentesis. 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 AFB and Triglyerides on ascitic fluid.        Hyponatremia  - Patient with sodium   Recent Labs   Lab 02/11/23  1144 02/11/23  1857 02/12/23  0528   * 128* 129*  129*   . sodium trending down    2/9 monitor on lasix BID.sodium trended down to 127.   2/10 sodium stable at 127 . continues with increased drain output 985ml/24h . negative balance of  2.8 L lasix on hold.  nephrology consulted.  Nephrology recs  fluid restriction to 1 L,  lasix 60 mg IV daily,  and trend sodium q12h.  diazepam discontinued   2/11  noncompliant with  fluid restriction to 1 L despite counseling. sodium trended down to 126 .received 2 doses of IV lasix 60mg. Increased Lasix IV 80 BID  due to UOP 500ml/24h   2/12 UOP of 1250ml/24h.sodium trended up to 129     Acute blood loss anemia  Hb 6.4 on 2/6, received 1U pRBCs. Hb 2/7 WNL.  - monitor Hb  - transfuse to maintain Hb >7  2/9    Patient's with Normocytic anemia.. Hemoglobin stable. Etiology likely due to chronic disease .  Current CBC reviewed-    Recent Labs   Lab 02/10/23  0236 02/11/23  0430 02/12/23  0528   HGB 7.4* 7.4* 7.7*    Monitor CBC and transfuse if H/H drops below 7/21.       Kyphosis of thoracolumbar region  See weakness    Thoracic discitis  See Weakness of both lower extremities    Anxiety and depression  Evaluated by Psychiatry at OSH prior to transfer. Recommended  increasing bupropion.  - Continue bupropion 300 mg QD  - Gabapentin 600 mg TID for anxiety and neuropathy  - PRN diazepam 5 mg q6h discontinued  2/13   s/p psychiatry eval  for anxiety/depression. xanax discontinued. Decreased Bupropion to 150 mg PO Daily . Started Duloxetine 30 mg PO BID and  PRN Hydroxyzine 25 mg PO q8h for anxiety      Severe obesity (BMI >= 40)  Body mass index is 48.51 kg/m². Morbid obesity complicates all aspects of disease management from diagnostic modalities to treatment. Weight loss encouraged        Substance use disorder  Denies IV drug use (meth) for past 3 years. Denies alcohol and tobacco abuse.     Chronic hepatitis C with cirrhosis  See above    Cirrhosis of liver with ascites  MELD-Na score: 11 at 2/8/2023  4:55 AM  MELD score: 11 at 2/8/2023  4:55 AM  Calculated from:  Serum Creatinine: 0.5 mg/dL (Using min of 1 mg/dL) at 2/8/2023  4:55 AM  Serum Sodium: 131 mmol/L at 2/8/2023  4:55 AM  Total Bilirubin: 1.6 mg/dL at 2/8/2023  4:55 AM  INR(ratio): 1.3 at 2/6/2023 10:47 AM  Age: 42 years      Patient has reportedly refused transplant evaluation in the past.   - Daily CMP and trend LFTs  - Continue Lactulose 20 g TID  - Lasix 40 mg PO BID  - Will need Hepatology follow up outpatient  2/10   ammonia at 57 . sono abdomen  . tylenol changed to 1000mg q12h with cirrhosis . s/p IR paracentesis today   2/11no evidence of SBP on paracentesis. 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 AFB and Triglyerides on ascitic fluid.    2/13 s/p paracentesis. Removed 4,700 ml. DVT sonogram LE negative.2/13 s/p paracentesis. Removed 4,700 ml. DVT sonogram LE negative. s/p psychiatry eval  for anxiety/depression. xanax discontinued. Decreased Bupropion to 150 mg PO Daily . Started Duloxetine 30 mg PO BID and  PRN Hydroxyzine 25 mg PO q8h for anxiety      VTE Risk Mitigation (From admission, onward)           Ordered     heparin (porcine) injection  5,000 Units  Every 8 hours         02/04/23 0848     IP VTE HIGH RISK PATIENT  Once         01/19/23 2153     Place sequential compression device  Until discontinued         01/19/23 2153     Reason for No Pharmacological VTE Prophylaxis  Once        Question:  Reasons:  Answer:  Physician Provided (leave comment)  Comment:  Potential NSGY procedure    01/19/23 2153                    Discharge Planning   SHIRA: 2/17/2023     Code Status: Partial Code   Is the patient medically ready for discharge?: No    Reason for patient still in hospital (select all that apply): Treatment  Discharge Plan A: Long-term acute care facility (LTAC)   Discharge Delays: None known at this time              Vincent Bal MD  Department of Hospital Medicine   [facility] - Telemetry Stepdown

## 2023-02-13 NOTE — SUBJECTIVE & OBJECTIVE
Interval History: seen and assessed at bedside, noted to have 4 one liter containers on table, grapes (full 1 liter cup), chips and 5 coke bottles at bedside. Re-enforced idea of fluid restriction to help stop worsening hyponatremia and that we are continuing lasix to help with naturiesis    Review of patient's allergies indicates:   Allergen Reactions    Bee venom protein (honey bee) Anaphylaxis     Patient reports she is allergic to bee stings.    Naproxen Anaphylaxis     Throat closing    12-23- Patient reports taking Ibuprofen 200 mg at home without problems. Verified X3. [place]    Wasp sting [allergen ext-venom-honey bee] Anaphylaxis    Adhesive Blisters    Iodine and iodide containing products Hives     Allergic to iodine in seafood only    Shellfish containing products     Nuts [tree nut] Rash     Current Facility-Administered Medications   Medication Frequency    0.9%  NaCl infusion (for blood administration) Q24H PRN    0.9%  NaCl infusion (for blood administration) Q24H PRN    acetaminophen tablet 1,000 mg Q12H    ALPRAZolam tablet 0.25 mg BID PRN    bisacodyL suppository 10 mg Every other day    buPROPion TB24 tablet 300 mg Daily    dextrose 10% bolus 125 mL 125 mL PRN    dextrose 10% bolus 250 mL 250 mL PRN    dronabinoL capsule 2.5 mg BID    furosemide injection 80 mg Q12H    gabapentin capsule 900 mg TID    glucagon (human recombinant) injection 1 mg PRN    glucose chewable tablet 16 g PRN    glucose chewable tablet 24 g PRN    heparin (porcine) injection 5,000 Units Q8H    hydrALAZINE tablet 25 mg Q8H PRN    HYDROmorphone injection 0.5 mg Q4H PRN    hydrOXYzine HCL tablet 25 mg TID PRN    lactulose 20 gram/30 mL solution Soln 20 g TID    magnesium hydroxide 400 mg/5 ml suspension 2,400 mg Daily PRN    melatonin tablet 6 mg Nightly PRN    meropenem (MERREM) 2 g in sodium chloride 0.9% 100 mL IVPB Q8H    methocarbamoL tablet 1,000 mg Q6H    naloxone 0.4 mg/mL injection 0.02 mg PRN    ondansetron injection 4  mg Q6H PRN    oxyCODONE immediate release tablet 5 mg Q3H PRN    And    oxyCODONE immediate release tablet Tab 10 mg Q3H PRN    pantoprazole EC tablet 40 mg Daily    polyethylene glycol packet 17 g Daily PRN    prochlorperazine injection Soln 2.5 mg Q6H PRN    senna-docusate 8.6-50 mg per tablet 1 tablet BID PRN    simethicone chewable tablet 80 mg TID PRN    sodium chloride 0.9% flush 10 mL Q12H PRN    sodium chloride 0.9% flush 10 mL Q6H    And    sodium chloride 0.9% flush 10 mL PRN    spironolactone tablet 25 mg Daily       Objective:     Vital Signs (Most Recent):  Temp: 97.2 °F (36.2 °C) (02/13/23 1200)  Pulse: (!) 118 (02/13/23 1057)  Resp: 16 (02/13/23 1024)  BP: (!) 119/56 (02/13/23 0934)  SpO2: 100 % (02/13/23 0800)   Vital Signs (24h Range):  Temp:  [97.2 °F (36.2 °C)-98.9 °F (37.2 °C)] 97.2 °F (36.2 °C)  Pulse:  [109-118] 118  Resp:  [16-20] 16  SpO2:  [96 %-100 %] 100 %  BP: ()/(39-62) 119/56     Weight: (!) 140.5 kg (309 lb 11.9 oz) (02/10/23 0600)  Body mass index is 48.51 kg/m².  Body surface area is 2.58 meters squared.    I/O last 3 completed shifts:  In: -   Out: 1655 [Urine:650; Drains:1005]    Physical Exam  Constitutional:       General: She is not in acute distress.     Appearance: Normal appearance. She is obese. She is not ill-appearing.   HENT:      Head: Normocephalic and atraumatic.   Eyes:      General: No scleral icterus.  Cardiovascular:      Rate and Rhythm: Normal rate.      Pulses: Normal pulses.   Pulmonary:      Effort: Pulmonary effort is normal.   Abdominal:      General: There is distension.      Tenderness: There is abdominal tenderness. There is no guarding.   Musculoskeletal:      Right lower leg: Edema present.      Left lower leg: Edema present.   Skin:     General: Skin is warm and dry.      Coloration: Skin is not jaundiced.   Neurological:      Mental Status: She is alert and oriented to person, place, and time.   Psychiatric:         Behavior: Behavior normal.        Significant Labs:  All labs within the past 24 hours have been reviewed.     Significant Imaging:  Labs: Reviewed

## 2023-02-13 NOTE — SUBJECTIVE & OBJECTIVE
Interval History: Significant LE edema contributing to limited movement. Pending US BLE today. Reports back pain is controlled, but endorses significant abdominal pain/pressure with movement. Incision c/d/I, MIHIR drains in place.     Medications:  Continuous Infusions:  Scheduled Meds:   acetaminophen  1,000 mg Oral Q12H    bisacodyL  10 mg Rectal Every other day    buPROPion  300 mg Oral Daily    dronabinoL  2.5 mg Oral BID    furosemide (LASIX) injection  80 mg Intravenous Q12H    gabapentin  900 mg Oral TID    heparin (porcine)  5,000 Units Subcutaneous Q8H    lactulose  20 g Oral TID    meropenem (MERREM) IVPB  2 g Intravenous Q8H    methocarbamoL  1,000 mg Oral Q6H    pantoprazole  40 mg Oral Daily    sodium chloride 0.9%  10 mL Intravenous Q6H    spironolactone  25 mg Oral Daily     PRN Meds:sodium chloride, sodium chloride, ALPRAZolam, dextrose 10%, dextrose 10%, glucagon (human recombinant), glucose, glucose, hydrALAZINE, HYDROmorphone, hydrOXYzine HCL, magnesium hydroxide 400 mg/5 ml, melatonin, naloxone, ondansetron, oxyCODONE **AND** oxyCODONE, polyethylene glycol, prochlorperazine, senna-docusate 8.6-50 mg, simethicone, sodium chloride 0.9%, Flushing PICC Protocol **AND** sodium chloride 0.9% **AND** sodium chloride 0.9%     Review of Systems  Objective:     Weight: (!) 140.5 kg (309 lb 11.9 oz)  Body mass index is 48.51 kg/m².  Vital Signs (Most Recent):  Temp: 97.2 °F (36.2 °C) (02/13/23 1200)  Pulse: (!) 118 (02/13/23 1057)  Resp: 16 (02/13/23 1024)  BP: (!) 119/56 (02/13/23 0934)  SpO2: 100 % (02/13/23 0800)   Vital Signs (24h Range):  Temp:  [97.2 °F (36.2 °C)-98.9 °F (37.2 °C)] 97.2 °F (36.2 °C)  Pulse:  [109-118] 118  Resp:  [16-20] 16  SpO2:  [96 %-100 %] 100 %  BP: ()/(39-62) 119/56     Date 02/13/23 0700 - 02/14/23 0659   Shift 2518-6673 6285-0844 6555-3104 24 Hour Total   INTAKE   Shift Total(mL/kg)       OUTPUT   Stool 2   2   Shift Total(mL/kg) 2(0)   2(0)   Weight (kg) 140.5 140.5 140.5  140.5                          Closed/Suction Drain 02/02/23 1401 Right Back Bulb 15 Fr. (Active)   Site Description Other (Comment) 02/10/23 2000   Dressing Type Other (Comment) 02/10/23 2000   Dressing Status Clean;Dry;Intact 02/10/23 0752   Dressing Intervention Integrity maintained 02/10/23 0752   Drainage Serosanguineous 02/10/23 2000   Status Other (Comment) 02/10/23 2000   Output (mL) 80 mL 02/11/23 1038            Closed/Suction Drain 02/02/23 1402 Right Back Bulb 15 Fr. (Active)   Site Description Other (Comment) 02/10/23 2000   Dressing Type Transparent (Tegaderm) 02/10/23 0752   Dressing Status Dry;Clean;Intact 02/10/23 0752   Dressing Intervention Integrity maintained 02/10/23 0752   Drainage Serosanguineous 02/10/23 2000   Status To bulb suction 02/10/23 0752   Output (mL) 0 mL 02/11/23 0526            Closed/Suction Drain 02/02/23 1402 Left Back Bulb 15 Fr. (Active)   Site Description Other (Comment) 02/10/23 2000   Dressing Type Transparent (Tegaderm) 02/10/23 0752   Dressing Status Clean;Intact 02/10/23 0752   Dressing Intervention Integrity maintained 02/10/23 0752   Drainage Serosanguineous 02/10/23 2000   Status To bulb suction 02/10/23 0752   Output (mL) 50 mL 02/11/23 0526            Closed/Suction Drain 02/02/23 1402 Left Back Bulb 15 Fr. (Active)   Site Description Other (Comment) 02/10/23 2000   Dressing Type Transparent (Tegaderm) 02/10/23 0752   Dressing Status Intact;Dry;Clean 02/10/23 0752   Dressing Intervention Integrity maintained 02/10/23 0752   Drainage Serosanguineous 02/10/23 2000   Status Open to gravity drainage 02/10/23 0752   Output (mL) 0 mL 02/11/23 0526       Female External Urinary Catheter 02/10/23 2323 (Active)   Output (mL) 500 mL 02/11/23 0527       Neurosurgery Physical Exam  General: well developed, well nourished, no distress.   Head: normocephalic, atraumatic  Neurologic: Alert and oriented. Thought content appropriate.  GCS: Motor: 6/Verbal: 5/Eyes: 4 GCS Total:  15  Mental Status: Awake, Alert, Oriented x 4  Language: No aphasia  Speech: No dysarthria  Cranial nerves: face symmetric, tongue midline, CN II-XII grossly intact.   Eyes: pupils equal, round, reactive to light with accommodation, EOMI. Dysconjugate gaze.  Pulmonary: normal respirations, no signs of respiratory distress  Skin: Skin is warm, dry and intact.  Sensory: intact to light touch throughout  Motor Strength: Moves all extremities spontaneously.     Strength   Deltoids Triceps Biceps Wrist Extension Wrist Flexion Hand    Upper: R 5/5 5/5 5/5 5/5 5/5 5/5     L 5/5 5/5 5/5 5/5 5/5 5/5       Iliopsoas Quadriceps Knee  Flexion Tibialis  anterior Gastro- cnemius EHL   Lower: R 2/5 2/5 2/5 2/5 2/5 2/5     L 2/5 2/5 2/5 2/5 2/5 2/5      BLE strength/ROM limited due to pain, body habitus and severe bilateral leg edema.     Thoracolumbar incision: C/D/I with staples. Skin edges well approximated. No surrounding erythema or edema. No drainage or TTP.      MIHIR drains x 4 intact to gravity with ss output.    Significant Labs:  Recent Labs   Lab 02/11/23  1857 02/12/23  0528 02/13/23  0500    106  103 116*  119*   * 129*  129* 127*  128*   K 3.8 3.4*  3.5 4.0  4.0   CL 97 99  100 97  97   CO2 22* 21*  21* 23  26   BUN 26* 27*  27* 29*  29*   CREATININE 0.6 0.5  0.5 0.6  0.6   CALCIUM 7.8* 7.2*  7.1* 7.4*  7.6*   MG  --  1.8 1.9       Recent Labs   Lab 02/12/23  0528 02/13/23  0500   WBC 5.01 4.85   HGB 7.7* 7.8*   HCT 24.6* 24.9*   * 112*       No results for input(s): LABPT, INR, APTT in the last 48 hours.  Microbiology Results (last 7 days)       Procedure Component Value Units Date/Time    Aerobic culture [[id number]] Collected: 02/10/23 1430    Order Status: Completed Specimen: Ascites Updated: 02/13/23 0830     Aerobic Bacterial Culture No growth    AFB Culture & Smear [[id number]]     Order Status: No result Specimen: Ascites     Fungus culture [[id number]]     Order Status: No  result Specimen: Ascites     Aerobic culture [[id number]]     Order Status: No result Specimen: Ascites     Culture, Anaerobic [[id number]]     Order Status: No result Specimen: Ascites     Gram stain [[id number]]     Order Status: No result Specimen: Ascites     Culture, Anaerobic [[id number]] Collected: 02/10/23 1430    Order Status: Completed Specimen: Ascites Updated: 02/12/23 1037     Anaerobic Culture Culture in progress    AFB Culture & Smear [[id number]]     Order Status: Completed Specimen: Ascites     Gram stain [[id number]] Collected: 02/10/23 1430    Order Status: Completed Specimen: Ascites Updated: 02/11/23 0333     Gram Stain Result No WBC's      No organisms seen    Fungus culture [[id number]] Collected: 01/24/23 0913    Order Status: Completed Specimen: Body Fluid from Back Updated: 02/08/23 0655     Fungus (Mycology) Culture Culture in progress      No fungus isolated after 2 weeks    Narrative:      1) Perispinal    Fungus culture [[id number]] Collected: 01/24/23 0943    Order Status: Completed Specimen: Bone from Back Updated: 02/08/23 0655     Fungus (Mycology) Culture Culture in progress      No fungus isolated after 2 weeks    Narrative:      3) Perispinal    Fungus culture [[id number]] Collected: 01/24/23 0943    Order Status: Completed Specimen: Bone from Back Updated: 02/08/23 0655     Fungus (Mycology) Culture Culture in progress      No fungus isolated after 2 weeks    Narrative:      4) Perispinal    Fungus culture [[id number]] Collected: 01/24/23 0913    Order Status: Completed Specimen: Body Fluid from Back Updated: 02/08/23 0655     Fungus (Mycology) Culture Culture in progress      No fungus isolated after 2 weeks    Narrative:      2) Perispinal          All pertinent labs from the last 24 hours have been reviewed.    Significant Diagnostics:  I have reviewed all pertinent imaging results/findings within the past 24 hours.

## 2023-02-13 NOTE — PLAN OF CARE
[facility] - Telemetry Stepdown  Discharge Reassessment    Primary Care Provider: Dannielle Merino DO    Expected Discharge Date: 2/17/2023    Reassessment (most recent)       Discharge Reassessment - 02/13/23 1355          Discharge Reassessment    Assessment Type Discharge Planning Reassessment     Did the patient's condition or plan change since previous assessment? No     Discharge Plan discussed with: Patient     Communicated SHIRA with patient/caregiver Yes     Discharge Plan A Long-term acute care facility (LTAC)     Discharge Plan B Rehab     DME Needed Upon Discharge  none     Discharge Barriers Identified None     Why the patient remains in the hospital Requires continued medical care        Post-Acute Status    Post-Acute Authorization Placement     Post-Acute Placement Status Pending medical clearance/testing   [facility]- spoke with Elyssa ([phone]) in admissions who reports they received insurance auth. DC is pending medical stability.    Discharge Delays None known at this time                 Milla Hagan, LUIS  [facility]  Ext. [phone]

## 2023-02-13 NOTE — ASSESSMENT & PLAN NOTE
Pt is 42F multiple spinal surgeries and revisions last T10- Pelvis in March 2022 who presented to OSH with concern for shoulder infection and UTI found to have E coli sepsis who has had progressive worsening BLE weakness. 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 Transferred to [facility] to  and neurosurgery was consulted.    OR 1/24 for temporary T6-12 PSIF. Taz pus noted intraoperatively.   OR 2/2 for T4-pelvis revision and extension of fusion with T9-10 corpectomy with plastic surgery assistance.  Maps goals > 85 completed 2/5 in ICU.    Plan:  --Stepped down to  floor.   -q4h neurochecks.  --All labs & diagnostics reviewed.   -Post op xrays pending. Ordered, ok to obtain supine today. Will try to obtain upright prior to discharge.  --TLSO to be worn when OOB only.  --MIHIR drains x4 to gravity, monitor output qshift. Per plastics - keep all drains until discharge -- management of drains per plastics. Following peripherally.  --Plastic surgery managing incision. Open to air.   - Off-load pressure as much as possible to promote wound healing.   - Elevate with wedge, alternate sides Q2H  --Thoracic discitis: BCx 1/20 no growth. OR cultures 1/24 grew ESBL E. coli   -- ID consulted. Recs for IV meropenem with estimated end date of 3/29/23.  --Pain Control: Continue multimodal regimen. PCA pump discontinued 2/8 with poor pain control subsequently.    - Anesthesia Pain Service consulted due to refractory pain, appreciate assistance. Continue current regimen.    - No NSAIDs as pt s/p recent instrumented fusion.  --DVT PPx: SQH/TEDs/SCDs. US BLE today.   --NSGY will continue to follow. Please contact team with any further questions.

## 2023-02-13 NOTE — PLAN OF CARE
Paracentesis done. Removed 4,700 ml. Albumin Not indicated. Patient AAOx3, no distress noted, respirations even and unlabored.

## 2023-02-13 NOTE — PROGRESS NOTES
[facility] - Telemetry Stepdown  Neurosurgery  Progress Note    Subjective:     History of Present Illness: Ms. Zazueta is a 42 y.o F w/ hx ofIV drug use (methamphetamine), chronic HCV with cirrhosis, spinal fusion (04/2021), epidural abscess with removal of hardware (02/2022), spinal fusion with hardware (T10 - Pelvis / 03/2022), obesity (BMI 39.3) and neurogenic bladder and recent shoulder surgery who initially presented to [facility] for evaluation of back pain and left leg weakness.  She reports initially noting the left leg weakness when she was about to go to the bathroom and was about to fall but was assisted by her boyfriend.  She was brought to the ED via EMS.  Urinalysis with UTI concerns and blood cultures at OSH grew ESBL E coli, and shoulder effusion concern for infection so she was treated with meropenem.  During hospitalization, she reports the weakness that started out in her left leg initially then spread upwards to her left thigh, left hip, then crossed over to the right hip and spread to her right leg.  Denies recent IV drug use. She reports her last incidence of drug use was more than 3 years ago and also denies tobacco, and alcohol abuse.  Reports cannabis use.     OSH course notable for concerning urinalysis and blood cultures positive for ESBL E coli treated with meropenem.  She was also admit to the ICU where she was treated with Precedex for significant body aches, irritability, and muscle spasms.  Concerns of a murmur on physical exam so she underwent TTE which did not show any vegetations.  Worsening lower extremity weakness workup with MRI head nonspecific, MRI cervical spine with multilevel spondylosis, X-ray spine with multilevel thoracolumbar fusion and diskectomy, focal kyphosis at T9-10 increased compared to prior.  She was started on prophylaxis amoxicillin due to her history of infected hardware.  MRI spine unable to be completed due to patient's body habitus so she was  transferred to [facility] for further imaging and Neurosurgery, Neurology evaluation.      Post-Op Info:  Procedure(s) (LRB):  LAMINECTOMY, SPINE, THORACIC, WITH FUSION (T4-Pelvis fusion w/ T9-10 corpectomies) **AIRO (N/A)   11 Days Post-Op     Interval History: Significant LE edema contributing to limited movement. Pending US BLE today. Reports back pain is controlled, but endorses significant abdominal pain/pressure with movement. Incision c/d/I, MIHIR drains in place.     Medications:  Continuous Infusions:  Scheduled Meds:   acetaminophen  1,000 mg Oral Q12H    bisacodyL  10 mg Rectal Every other day    buPROPion  300 mg Oral Daily    dronabinoL  2.5 mg Oral BID    furosemide (LASIX) injection  80 mg Intravenous Q12H    gabapentin  900 mg Oral TID    heparin (porcine)  5,000 Units Subcutaneous Q8H    lactulose  20 g Oral TID    meropenem (MERREM) IVPB  2 g Intravenous Q8H    methocarbamoL  1,000 mg Oral Q6H    pantoprazole  40 mg Oral Daily    sodium chloride 0.9%  10 mL Intravenous Q6H    spironolactone  25 mg Oral Daily     PRN Meds:sodium chloride, sodium chloride, ALPRAZolam, dextrose 10%, dextrose 10%, glucagon (human recombinant), glucose, glucose, hydrALAZINE, HYDROmorphone, hydrOXYzine HCL, magnesium hydroxide 400 mg/5 ml, melatonin, naloxone, ondansetron, oxyCODONE **AND** oxyCODONE, polyethylene glycol, prochlorperazine, senna-docusate 8.6-50 mg, simethicone, sodium chloride 0.9%, Flushing PICC Protocol **AND** sodium chloride 0.9% **AND** sodium chloride 0.9%     Review of Systems  Objective:     Weight: (!) 140.5 kg (309 lb 11.9 oz)  Body mass index is 48.51 kg/m².  Vital Signs (Most Recent):  Temp: 97.2 °F (36.2 °C) (02/13/23 1200)  Pulse: (!) 118 (02/13/23 1057)  Resp: 16 (02/13/23 1024)  BP: (!) 119/56 (02/13/23 0934)  SpO2: 100 % (02/13/23 0800)   Vital Signs (24h Range):  Temp:  [97.2 °F (36.2 °C)-98.9 °F (37.2 °C)] 97.2 °F (36.2 °C)  Pulse:  [109-118] 118  Resp:  [16-20] 16  SpO2:  [96 %-100  %] 100 %  BP: ()/(39-62) 119/56     Date 02/13/23 0700 - 02/14/23 0659   Shift 0423-2137 7141-1853 2522-1824 24 Hour Total   INTAKE   Shift Total(mL/kg)       OUTPUT   Stool 2   2   Shift Total(mL/kg) 2(0)   2(0)   Weight (kg) 140.5 140.5 140.5 140.5                          Closed/Suction Drain 02/02/23 1401 Right Back Bulb 15 Fr. (Active)   Site Description Other (Comment) 02/10/23 2000   Dressing Type Other (Comment) 02/10/23 2000   Dressing Status Clean;Dry;Intact 02/10/23 0752   Dressing Intervention Integrity maintained 02/10/23 0752   Drainage Serosanguineous 02/10/23 2000   Status Other (Comment) 02/10/23 2000   Output (mL) 80 mL 02/11/23 1038            Closed/Suction Drain 02/02/23 1402 Right Back Bulb 15 Fr. (Active)   Site Description Other (Comment) 02/10/23 2000   Dressing Type Transparent (Tegaderm) 02/10/23 0752   Dressing Status Dry;Clean;Intact 02/10/23 0752   Dressing Intervention Integrity maintained 02/10/23 0752   Drainage Serosanguineous 02/10/23 2000   Status To bulb suction 02/10/23 0752   Output (mL) 0 mL 02/11/23 0526            Closed/Suction Drain 02/02/23 1402 Left Back Bulb 15 Fr. (Active)   Site Description Other (Comment) 02/10/23 2000   Dressing Type Transparent (Tegaderm) 02/10/23 0752   Dressing Status Clean;Intact 02/10/23 0752   Dressing Intervention Integrity maintained 02/10/23 0752   Drainage Serosanguineous 02/10/23 2000   Status To bulb suction 02/10/23 0752   Output (mL) 50 mL 02/11/23 0526            Closed/Suction Drain 02/02/23 1402 Left Back Bulb 15 Fr. (Active)   Site Description Other (Comment) 02/10/23 2000   Dressing Type Transparent (Tegaderm) 02/10/23 0752   Dressing Status Intact;Dry;Clean 02/10/23 0752   Dressing Intervention Integrity maintained 02/10/23 0752   Drainage Serosanguineous 02/10/23 2000   Status Open to gravity drainage 02/10/23 0752   Output (mL) 0 mL 02/11/23 0526       Female External Urinary Catheter 02/10/23 6283 (Active)   Output (mL)  500 mL 02/11/23 0527       Neurosurgery Physical Exam  General: well developed, well nourished, no distress.   Head: normocephalic, atraumatic  Neurologic: Alert and oriented. Thought content appropriate.  GCS: Motor: 6/Verbal: 5/Eyes: 4 GCS Total: 15  Mental Status: Awake, Alert, Oriented x 4  Language: No aphasia  Speech: No dysarthria  Cranial nerves: face symmetric, tongue midline, CN II-XII grossly intact.   Eyes: pupils equal, round, reactive to light with accommodation, EOMI. Dysconjugate gaze.  Pulmonary: normal respirations, no signs of respiratory distress  Skin: Skin is warm, dry and intact.  Sensory: intact to light touch throughout  Motor Strength: Moves all extremities spontaneously.     Strength   Deltoids Triceps Biceps Wrist Extension Wrist Flexion Hand    Upper: R 5/5 5/5 5/5 5/5 5/5 5/5     L 5/5 5/5 5/5 5/5 5/5 5/5       Iliopsoas Quadriceps Knee  Flexion Tibialis  anterior Gastro- cnemius EHL   Lower: R 2/5 2/5 2/5 2/5 2/5 2/5     L 2/5 2/5 2/5 2/5 2/5 2/5      BLE strength/ROM limited due to pain, body habitus and severe bilateral leg edema.     Thoracolumbar incision: C/D/I with staples. Skin edges well approximated. No surrounding erythema or edema. No drainage or TTP.      MIHIR drains x 4 intact to gravity with ss output.    Significant Labs:  Recent Labs   Lab 02/11/23  1857 02/12/23  0528 02/13/23  0500    106  103 116*  119*   * 129*  129* 127*  128*   K 3.8 3.4*  3.5 4.0  4.0   CL 97 99  100 97  97   CO2 22* 21*  21* 23  26   BUN 26* 27*  27* 29*  29*   CREATININE 0.6 0.5  0.5 0.6  0.6   CALCIUM 7.8* 7.2*  7.1* 7.4*  7.6*   MG  --  1.8 1.9       Recent Labs   Lab 02/12/23  0528 02/13/23  0500   WBC 5.01 4.85   HGB 7.7* 7.8*   HCT 24.6* 24.9*   * 112*       No results for input(s): LABPT, INR, APTT in the last 48 hours.  Microbiology Results (last 7 days)       Procedure Component Value Units Date/Time    Aerobic culture [[id number]] Collected:  02/10/23 1430    Order Status: Completed Specimen: Ascites Updated: 02/13/23 0830     Aerobic Bacterial Culture No growth    AFB Culture & Smear [[id number]]     Order Status: No result Specimen: Ascites     Fungus culture [[id number]]     Order Status: No result Specimen: Ascites     Aerobic culture [[id number]]     Order Status: No result Specimen: Ascites     Culture, Anaerobic [[id number]]     Order Status: No result Specimen: Ascites     Gram stain [[id number]]     Order Status: No result Specimen: Ascites     Culture, Anaerobic [[id number]] Collected: 02/10/23 1430    Order Status: Completed Specimen: Ascites Updated: 02/12/23 1037     Anaerobic Culture Culture in progress    AFB Culture & Smear [[id number]]     Order Status: Completed Specimen: Ascites     Gram stain [[id number]] Collected: 02/10/23 1430    Order Status: Completed Specimen: Ascites Updated: 02/11/23 0333     Gram Stain Result No WBC's      No organisms seen    Fungus culture [[id number]] Collected: 01/24/23 0913    Order Status: Completed Specimen: Body Fluid from Back Updated: 02/08/23 0655     Fungus (Mycology) Culture Culture in progress      No fungus isolated after 2 weeks    Narrative:      1) Perispinal    Fungus culture [[id number]] Collected: 01/24/23 0943    Order Status: Completed Specimen: Bone from Back Updated: 02/08/23 0655     Fungus (Mycology) Culture Culture in progress      No fungus isolated after 2 weeks    Narrative:      3) Perispinal    Fungus culture [[id number]] Collected: 01/24/23 0943    Order Status: Completed Specimen: Bone from Back Updated: 02/08/23 0655     Fungus (Mycology) Culture Culture in progress      No fungus isolated after 2 weeks    Narrative:      4) Perispinal    Fungus culture [[id number]] Collected: 01/24/23 0913    Order Status: Completed Specimen: Body Fluid from Back Updated: 02/08/23 0655     Fungus (Mycology) Culture Culture in progress      No fungus isolated after 2 weeks    Narrative:      2)  Perispinal          All pertinent labs from the last 24 hours have been reviewed.    Significant Diagnostics:  I have reviewed all pertinent imaging results/findings within the past 24 hours.      Assessment/Plan:     * Weakness of both lower extremities  Pt is 42F multiple spinal surgeries and revisions last T10- Pelvis in March 2022 who presented to OSH with concern for shoulder infection and UTI found to have E coli sepsis who has had progressive worsening BLE weakness. 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 Transferred to [facility] to  and neurosurgery was consulted.    OR 1/24 for temporary T6-12 PSIF. Taz pus noted intraoperatively.   OR 2/2 for T4-pelvis revision and extension of fusion with T9-10 corpectomy with plastic surgery assistance.  Maps goals > 85 completed 2/5 in ICU.    Plan:  --Stepped down to  floor.   -q4h neurochecks.  --All labs & diagnostics reviewed.   -Post op xrays pending. Ordered, ok to obtain supine today. Will try to obtain upright prior to discharge.  --TLSO to be worn when OOB only.  --MIHIR drains x4 to gravity, monitor output qshift. Per plastics - keep all drains until discharge -- management of drains per plastics. Following peripherally.  --Plastic surgery managing incision. Open to air.   - Off-load pressure as much as possible to promote wound healing.   - Elevate with wedge, alternate sides Q2H  --Thoracic discitis: BCx 1/20 no growth. OR cultures 1/24 grew ESBL E. coli   -- ID consulted. Recs for IV meropenem with estimated end date of 3/29/23.  --Pain Control: Continue multimodal regimen. PCA pump discontinued 2/8 with poor pain control subsequently.    - Anesthesia Pain Service consulted due to refractory pain, appreciate assistance. Continue current regimen.    - No NSAIDs as pt s/p recent instrumented fusion.  --DVT PPx: SQH/TEDs/SCDs. US BLE today.   --NSGY will continue to follow. Please contact team with any further questions.        Lazara  ANNE Yost  Neurosurgery  [facility] - Telemetry Stepdown

## 2023-02-13 NOTE — PLAN OF CARE
Problem: Physical Therapy  Goal: Physical Therapy Goal  Description: Goals to be met by: 23, extended goals to      Patient will increase functional independence with mobility by performin. Supine to sit with Moderate Assistance  2. Sit to supine with Moderate Assistance  3. Rolling to Left and Right with Moderate Assistance.  4. Sit to stand transfer with Maximum Assistance  5. Sit edge of bed with ANTONIO UE support for 8 min with contact guard assist to prepare for functional activities in sitting.     Outcome: Ongoing, Progressing   Updated goals to reflect patient's progress. Continue with plan of care.   Chantale Mccormick, PT  2023

## 2023-02-13 NOTE — CONSULTS
CONSULTATION LIAISON PSYCHIATRY INITIAL EVALUATION    Patient Name: Rachel Zazueta  MRN: [id number]  Patient Class: IP- Inpatient  Admission Date: 1/19/2023  Attending Physician: Vinecnt Bal MD      HPI:   Rahcel Zazueta is a 42 y.o. female with past psychiatric history of Major Depressive Disorder diagnosed two years ago & past pertinent medical history of chronic HCV with cirrhosis, IV drug abuse (methamphetamine) spinal fusion with hardware (T10 - Pelvis / 03/2022), obesity (BMI 39.3) and neurogenic bladder and recent shoulder surgery who initially presented to [facility] for evaluation of back pain and leg weakness. OSH course notable for concerning urinalysis and blood cultures positive for ESBL E coli treated with meropenem.  She was also admit to the ICU where she was treated with Precedex for significant body aches, irritability, and muscle spasms.    Patient requested psychiatry consult because of hopelessness thoughts, anxiety and depression.    On psych exam, patient was cooperative, oriented to person, place, time and situation. Patient reports feeling anxious and depressed because of her health status. Says that she was diagnosed with major depressive disorder two years ago, which she relates to multiple relatives' deaths throughout her life. Sertraline was prescribed at the moment however it did not help with her symptoms. Psychiatry nurse practitioner changed her prescription to Wellbutrin on November 2022 which the patient reports has helped with her depression. Patient states this dose was recently increased to 300 mg Daily at [facility] prior to transfer. Denies previous therapy sessions; however, she expresses interest in speaking with psychology while inpatient. Also, she reports history of using multiple substances (methamphetamine, heroin, cocaine, etc...)since she was 16y/o. Her last use was methamphetamines three weeks before her hospital admission (12/23/22).  "Denies history of seizures, denies any symptoms of yola, denies visual or auditory hallucinations, denies thoughts of hurting other persons. Patient reports thoughts of hopelessness and that sometimes she would like to "give up on life" and "it would be easier if I was dead". However, patient adamantly denies any current suicidal thoughts, intentions, or plan. Patient does not have history of any previous suicide attempts or any self harming behaviors in the past. Patient shares she has friends who support her and is active in her effie, both protective factors.     Collateral with patient's permission:   Meggan Llamas (Friend) [phone]    Medical Review of Systems:  Pertinent items are noted in HPI.    Psychiatric Review of Systems (is patient experiencing or having changes in):  sleep: yes  appetite: no  weight: no  energy/anergy: yes  interest/pleasure/anhedonia: yes  somatic symptoms: no  libido: no  anxiety/panic: yes  guilty/hopelessness: yes  concentration: yes  Yola:no  Psychosis: no  Trauma: no  S.I.B.s/risky behavior: no    Past Psychiatric History:  Previous Medication Trials: sertraline 75mg with no improvement, Wellbutrin since November with improvement  Previous Psychiatric Hospitalizations:no   Previous Suicide Attempts: no  History of Violence: not assessed  Outpatient Psychiatrist: psychiatric NP (started seeing in November)  Family Psychiatric History: mom with opioid use disorder    Substance Abuse History (with emphasis over the last 12 months):  Recreational Drugs: cocaine, ecstasy, heroin, LSD, marijuana, and methamphetamines  Use of Alcohol:  yes  Tobacco Use: yes  Rehab History: not assessed    Social History:  Marital Status: single  Children: did not assess  Employment Status/Info: did not assess  :no  Education: did not assess  Special Ed: no  Housing Status: alone  Access to gun: no  Psychosocial Stressors: family, health, and drug and alcohol  Functioning Relationships: " "strained with boyfriend, good emotional support from friends    Legal History:  Past Charges/Incarcerations: no  Pending charges:no    Mental Status Exam:  Arousal: alert  Sensorium/Orientation: oriented to person, place, situation, time/date  Behavior/Cooperation: normal, cooperative, eye contact normal   Speech: normal tone, normal rate, normal pitch, normal volume  Language: intact  Gait and Station: unable to assess - patient lying down or seated  Involuntary Movements and Motor Activity: no abnormal involuntary movements noted, no psychomotor agitation or retardation  Mood: " anxious and hopeless "   Affect: depressed, anxious, and tearful  Thought Process: normal and logical, circumstantial  Associations: intact  Thought Content: normal, no suicidality, no homicidality, delusions, or paranoia. Attention/Concentration: intact, spelled "HOUSE" forwards and backwards  Memory:    Recent:  Intact   Remote: Intact   3/3 immediate, 3/3 at 5 minutes  Fund of Knowledge: Aware of current events, Intact, and Vocabulary appropriate    Abstract reasoning: proverbs were abstract  Insight: intact, has awareness of illness  Judgment: behavior is adequate to circumstances, age appropriate     CAM ICU positive? no      ASSESSMENT & RECOMMENDATIONS   Major Depressive Disorder, moderate to severe  Methamphetamine use disorder, severe  Unspecified anxiety d/o    Decrease Bupropion to 150 mg PO Daily due to persistent anxiety that may worsen  Start Duloxetine 30 mg PO BID to address mood and anxiety with added benefit of assistance with chronic pain  Stop PRN Alprazolam due pts h/o significant substance use history and chronic hcv  Continue PRN Hydroxyzine 25 mg PO q8h for anxiety or insomnia    RISK ASSESSMENT  NO NEED FOR PEC patient NOT in any imminent danger of hurting self or others and not gravely disabled.     FOLLOW UP  Will follow up while in house    DISPOSITION - once medically cleared:   Defer to medical team    Please " contact ON CALL psychiatry service (24/7) for any acute issues that may arise.    Patient was seen and evaluated by me, chart reviewed. MS4 Jessiecharan Domingo interviewed the patient first and then I performed my assessment - our findings are integrated above. We discussed the patient's evaluation and devised an assessment and plan. The student documented parts of the note with supervising and editing.      Dr. Carlin Sosa   Psychiatry  [facility]  2/13/2023 11:28 AM    --------------------------------------------------------------------------------------------------------------------------------------------------------------------------------------------------------------------------------------    STAFF NOTE    I have seen the patient, reviewed the Resident's history and physical, assessment, and plan. I have personally interviewed and clinically examined the patient and: agree with the findings. In addition to other pertinent findings per my assessment during rounds added to the above note.     JOSUE MARTIN MD   Department of Psychiatry   [facility]  2/13/2023         --------------------------------------------------------------------------------------------------------------------------------------------------------------------------------------------------------------------------------------    CONTINUED HISTORY & OBJECTIVE clinical data & findings reviewed and noted for above decision making    Past Medical/Surgical History:   Past Medical History:   Diagnosis Date    Anemia     Anxiety     Depressed     Diskitis     Hep C w/o coma, chronic     IV drug abuse     Kidney stone     Liver cirrhosis      Past Surgical History:   Procedure Laterality Date    ARTHROTOMY OF SHOULDER Left 11/15/2022    Procedure: ARTHROTOMY, SHOULDER;  Surgeon: Bjorn Hayes MD;  Location: [facility];  Service: Orthopedics;  Laterality: Left;  Left acromioclavicular joint arthrotomy    BACK  SURGERY      benine tumor removal      forehead, age 9    CHOLECYSTECTOMY      KIDNEY SURGERY      LUMBAR FUSION Bilateral 3/2/2022    Procedure: FUSION, SPINE, LUMBAR;  Surgeon: Guille Templeton MD;  Location: [facility] OR 2ND FLR;  Service: Neurosurgery;  Laterality: Bilateral;  AIRO  T10--Pelvis    LUMBAR FUSION N/A 1/24/2023    Procedure: **AIRO** T8-T12 POSTERIOR FUSION, T8-T10 LAMINECTOMY, HARDWARE REMOVAL AND WASHOUT;  Surgeon: Guille Templeton MD;  Location: [facility] OR [facility] FLR;  Service: Neurosurgery;  Laterality: N/A;    REMOVAL OF HARDWARE FROM SPINE N/A 2/21/2022    Procedure: REMOVAL, HARDWARE, SPINE;  Surgeon: Guille Templeton MD;  Location: [facility] OR [facility] FLR;  Service: Neurosurgery;  Laterality: N/A;  [place]    SPINE SURGERY      THORACIC LAMINECTOMY WITH FUSION N/A 2/2/2023    Procedure: LAMINECTOMY, SPINE, THORACIC, WITH FUSION (T4-Pelvis fusion w/ T9-10 corpectomies) **AIRO;  Surgeon: Guille Templeton MD;  Location: [facility] OR [facility] FLR;  Service: Neurosurgery;  Laterality: N/A;  AIRO, 2 bovies, aquamantys, Globus, Depuy, antibiotic beads, TXA, Peroxide; Babycos plastics closure       Current Medications:   Scheduled Meds:    acetaminophen  1,000 mg Oral Q12H    bisacodyL  10 mg Rectal Every other day    buPROPion  300 mg Oral Daily    dronabinoL  2.5 mg Oral BID    furosemide (LASIX) injection  80 mg Intravenous Q12H    gabapentin  900 mg Oral TID    heparin (porcine)  5,000 Units Subcutaneous Q8H    lactulose  20 g Oral TID    meropenem (MERREM) IVPB  2 g Intravenous Q8H    methocarbamoL  1,000 mg Oral Q6H    pantoprazole  40 mg Oral Daily    sodium chloride 0.9%  10 mL Intravenous Q6H    spironolactone  25 mg Oral Daily     PRN Meds: sodium chloride, sodium chloride, ALPRAZolam, dextrose 10%, dextrose 10%, glucagon (human recombinant), glucose, glucose, hydrALAZINE, HYDROmorphone, hydrOXYzine HCL, magnesium hydroxide 400 mg/5 ml, melatonin, naloxone, ondansetron, oxyCODONE **AND** oxyCODONE, polyethylene glycol,  prochlorperazine, senna-docusate 8.6-50 mg, simethicone, sodium chloride 0.9%, Flushing PICC Protocol **AND** sodium chloride 0.9% **AND** sodium chloride 0.9%  OTC Meds:     Allergies:   Review of patient's allergies indicates:   Allergen Reactions    Bee venom protein (honey bee) Anaphylaxis     Patient reports she is allergic to bee stings.    Naproxen Anaphylaxis     Throat closing    12-23- Patient reports taking Ibuprofen 200 mg at home without problems. Verified X3. [place]    Wasp sting [allergen ext-venom-honey bee] Anaphylaxis    Adhesive Blisters    Iodine and iodide containing products Hives     Allergic to iodine in seafood only    Shellfish containing products     Nuts [tree nut] Rash       Vitals  Vitals:    02/13/23 1057   BP:    Pulse: (!) 118   Resp:    Temp:        Labs/Imaging/Studies:  Recent Results (from the past 24 hour(s))   Renal Function Panel    Collection Time: 02/13/23  5:00 AM   Result Value Ref Range    Glucose 119 (H) 70 - 110 mg/dL    Sodium 128 (L) 136 - 145 mmol/L    Potassium 4.0 3.5 - 5.1 mmol/L    Chloride 97 95 - 110 mmol/L    CO2 26 23 - 29 mmol/L    BUN 29 (H) 6 - 20 mg/dL    Calcium 7.6 (L) 8.7 - 10.5 mg/dL    Creatinine 0.6 0.5 - 1.4 mg/dL    Albumin 1.9 (L) 3.5 - 5.2 g/dL    Phosphorus 3.0 2.7 - 4.5 mg/dL    eGFR >60.0 >60 mL/min/1.73 m^2    Anion Gap 5 (L) 8 - 16 mmol/L   Comprehensive Metabolic Panel    Collection Time: 02/13/23  5:00 AM   Result Value Ref Range    Sodium 127 (L) 136 - 145 mmol/L    Potassium 4.0 3.5 - 5.1 mmol/L    Chloride 97 95 - 110 mmol/L    CO2 23 23 - 29 mmol/L    Glucose 116 (H) 70 - 110 mg/dL    BUN 29 (H) 6 - 20 mg/dL    Creatinine 0.6 0.5 - 1.4 mg/dL    Calcium 7.4 (L) 8.7 - 10.5 mg/dL    Total Protein 5.0 (L) 6.0 - 8.4 g/dL    Albumin 1.9 (L) 3.5 - 5.2 g/dL    Total Bilirubin 1.2 (H) 0.1 - 1.0 mg/dL    Alkaline Phosphatase 148 (H) 55 - 135 U/L    AST 40 10 - 40 U/L    ALT 26 10 - 44 U/L    Anion Gap 7 (L) 8 - 16 mmol/L    eGFR >60.0 >60  mL/min/1.73 m^2   Magnesium    Collection Time: 02/13/23  5:00 AM   Result Value Ref Range    Magnesium 1.9 1.6 - 2.6 mg/dL   Phosphorus    Collection Time: 02/13/23  5:00 AM   Result Value Ref Range    Phosphorus 2.9 2.7 - 4.5 mg/dL   CBC Auto Differential    Collection Time: 02/13/23  5:00 AM   Result Value Ref Range    WBC 4.85 3.90 - 12.70 K/uL    RBC 2.71 (L) 4.00 - 5.40 M/uL    Hemoglobin 7.8 (L) 12.0 - 16.0 g/dL    Hematocrit 24.9 (L) 37.0 - 48.5 %    MCV 92 82 - 98 fL    MCH 28.8 27.0 - 31.0 pg    MCHC 31.3 (L) 32.0 - 36.0 g/dL    RDW 19.6 (H) 11.5 - 14.5 %    Platelets 112 (L) 150 - 450 K/uL    MPV 9.3 9.2 - 12.9 fL    Immature Granulocytes 0.6 (H) 0.0 - 0.5 %    Gran # (ANC) 3.0 1.8 - 7.7 K/uL    Immature Grans (Abs) 0.03 0.00 - 0.04 K/uL    Lymph # 0.7 (L) 1.0 - 4.8 K/uL    Mono # 0.8 0.3 - 1.0 K/uL    Eos # 0.3 0.0 - 0.5 K/uL    Baso # 0.04 0.00 - 0.20 K/uL    nRBC 0 0 /100 WBC    Gran % 61.0 38.0 - 73.0 %    Lymph % 15.1 (L) 18.0 - 48.0 %    Mono % 17.3 (H) 4.0 - 15.0 %    Eosinophil % 5.2 0.0 - 8.0 %    Basophil % 0.8 0.0 - 1.9 %    Differential Method Automated

## 2023-02-13 NOTE — NURSING
"AAO x4. O2 at 2L NBP. NADN with safety check performed. Call light within reach. Requesting psych consult d/t "not much hope left." Labs et POC reviewed w/ pt. MD notified of all of above.   "

## 2023-02-13 NOTE — PT/OT/SLP PROGRESS
Physical Therapy Treatment    Patient Name:  Rachel Zazueta   MRN:  [id number]    Recommendations:     Discharge Recommendations: nursing facility, skilled  Discharge Equipment Recommendations: hospital bed, lift device  Barriers to discharge: Inaccessible home and patient below functional baseline    Assessment:     Rachel Zazueta is a 42 y.o. female admitted with a medical diagnosis of Weakness of both lower extremities.  She presents with the following impairments/functional limitations: weakness, impaired endurance, impaired self care skills, impaired functional mobility, decreased coordination, pain, decreased lower extremity function, decreased upper extremity function, orthopedic precautions, impaired skin, edema, impaired balance. The patient was motivated to mobilize with therapy this session following pre-medication for pain. Patient attempting to initiate log roll for supine to sit, maximum assistance x2 to complete with HOB elevated. Patient showed progress with static and dynamic sitting balance, initially she required maximum assistance for trunk control with posterior lean then progressed to minimum assistance with L hand holding HR and R HHA. Performed seated crunches with moderate assistance and R HHA, anterior reaching with ANTONIO UE with minimum assistance to moderate assistance for balance.  Based on the patient's progress with therapy, motivation to participate in treatment, and prior level of independence, they are an excellent candidate for inpatient rehabilitation and they would benefit from rehab to maximize their functional potential.      Rehab Prognosis: Good; patient would benefit from acute skilled PT services to address these deficits and reach maximum level of function.    Recent Surgery: Procedure(s) (LRB):  LAMINECTOMY, SPINE, THORACIC, WITH FUSION (T4-Pelvis fusion w/ T9-10 corpectomies) **AIRO (N/A) 11 Days Post-Op    Plan:     During this hospitalization, patient to be seen 3  "x/week to address the identified rehab impairments via therapeutic activities, therapeutic exercises, neuromuscular re-education, wheelchair management/training and progress toward the following goals:    Plan of Care Expires:  03/05/23    Subjective     Chief Complaint: "My purewick failed and I had a BM, I've been waiting on the RN to change me"  Patient/Family Comments/goals: motivated to get stronger and more independent  Pain/Comfort:  Pain Rating 1:  (back pain, did not rate)  Location - Orientation 1: generalized  Location 1: back  Pain Addressed 1: Distraction, Pre-medicate for activity  Pain Rating Post-Intervention 1:  (remained)      Objective:     Communicated with RN prior to session.  Patient found HOB elevated with peripheral IV, PureWick, MIHIR drain, oxygen upon PT entry to room. Tech present and assisting during PT session.     General Precautions: Standard, contact, fall  Orthopedic Precautions: spinal precautions  Braces: TLSO  Respiratory Status: Nasal cannula, flow 0.5 L/min     Functional Mobility:  TLSO donned in sitting  Bed Mobility  Rolling to R and L for pericare and linen: maximum assistance, cues for log roll and assist at LE and trunk, patient reaching across her body to initiate  Supine to Sit on the R side:  maximum assistance x2, HOB elevated, assist at LE and trunk, patient initiating t/f  Sit to supine: maximum assistance x2  Scoot to HOB in supine: total assistance x2 drawsheet  Scoot to EOB in sitting: maximum assistance    Transfers Sit to Stand:  NA, poor sitting balance, LE weakness          AM-PAC 6 CLICK MOBILITY  Turning over in bed (including adjusting bedclothes, sheets and blankets)?: 2  Sitting down on and standing up from a chair with arms (e.g., wheelchair, bedside commode, etc.): 1  Moving from lying on back to sitting on the side of the bed?: 2  Moving to and from a bed to a chair (including a wheelchair)?: 1  Need to walk in hospital room?: 1  Climbing 3-5 steps with " a railing?: 1  Basic Mobility Total Score: 8       Treatment & Education:  Patient educated on role of therapy, goals of session, benefits of out of bed mobility. Patient agreeable to mobilize with therapy.      Performed rolling in bed for pericare and linen change.     Sitting edge of bed 15 minutes, maximum assistance for static sitting balance; progressed to minimum assistance with L hand holding HR and R HHA, minimum assistance to moderate assistance for dynamic weight shift with ANTONIO UE support, weight shift posterior and L  -Posterior pelvic tilt, kyphosis, cervical flexion  -Visual/verbal/manual cues for midline orientation, thoracic and cervical extension, ANTONIO feet in Wbing on the floor  -Facilitation for anterior pelvic tilt and cues for scapula depression and retraction  -Seated crunches with cues for breathing technique, 5 reps x2 with R HHA, L hand on HR; good core engagement and anterior weight shift  -Performed seated reaching with ANTONIO UE 5 reps x2, with minimum assistance to moderate assistance   -Seated LAQ 10 reps ea AAROM for terminal knee extension, cues for full ROM and eccentric lowering    Encouraged patient to perform supine LE therex: quad sets, glute sets, AP.     Patient ed on importance of neutral hip rotation, LE supported in neutral rotation.     Patient left HOB elevated with all lines intact, call button in reach, and RN notified..    GOALS:   Multidisciplinary Problems       Physical Therapy Goals          Problem: Physical Therapy    Goal Priority Disciplines Outcome Goal Variances Interventions   Physical Therapy Goal     PT, PT/OT Ongoing, Progressing     Description: Goals to be met by: 23, extended goals to      Patient will increase functional independence with mobility by performin. Supine to sit with Moderate Assistance  2. Sit to supine with Moderate Assistance  3. Rolling to Left and Right with Moderate Assistance.  4. Sit to stand transfer with Maximum  Assistance  5. Sit edge of bed with ANTONIO UE support for 8 min with contact guard assist to prepare for functional activities in sitting.                          Time Tracking:     PT Received On: 02/13/23  PT Start Time: 1150     PT Stop Time: 1230  PT Total Time (min): 40 min     Billable Minutes: Therapeutic Activity 15, Therapeutic Exercise 10, and Neuromuscular Re-education 15    Treatment Type: Treatment  PT/PTA: PT     PTA Visit Number: 0     02/13/2023

## 2023-02-13 NOTE — NURSING
Patients back pain 6/10 after prn dilaudid. Patient is receiving pain meds around the clock and educated patient on other options to manage pain. Offered patient to turn q 2 hours but patient is refusing positioning at times.

## 2023-02-13 NOTE — ASSESSMENT & PLAN NOTE
42 y.o F w/ hx of IV drug use (meth), chronic HCV with cirrhosis, spinal fusion (04/2021), epidural abscess with removal of hardware (02/2022), spinal fusion with hardware (T10 - Pelvis / 03/2022), and neurogenic bladder here with complicated ESBL E.coli epidural abscess s/p washout,corpectomy, fixation being treated w/ meropenem.     Nephrology consulted for hyponatremia. Patient reports uncomfortable abdominal distension and leg swelling. Reports drinking 3-4 cups of her 30oz water tumbler daily. Urine Na <10, urine osm 513. Serum osm 283. Serum Na stable. Normal mentation.    Hyponatremia likely 2/2 to cirrhosis and hypervolemia. Possible poor solute intake relative to free water intake. Unlikely SIADH given low urine Na.     - Fluid restrict to 1 L daily to avoid worsening of hyponatremia. Patient currently not compliant w/ fluid restriction despite counseling.   - Lasix IV 80 BID, continue spironolactone 25mg daily  - Asymptomatic, no role for hypertonic saline  - Goal increase sodium 3-6 mM/24h  - q12h Na check

## 2023-02-13 NOTE — H&P
Inpatient Radiology Pre-procedure Note    History of Present Illness:  Rachel Zazueta is a 42 y.o. female who presents for ultrasound guided paracentesis.  Admission H&P reviewed.  Past Medical History:   Diagnosis Date    Anemia     Anxiety     Depressed     Diskitis     Hep C w/o coma, chronic     IV drug abuse     Kidney stone     Liver cirrhosis      Past Surgical History:   Procedure Laterality Date    ARTHROTOMY OF SHOULDER Left 11/15/2022    Procedure: ARTHROTOMY, SHOULDER;  Surgeon: Bjorn Hayes MD;  Location: [facility];  Service: Orthopedics;  Laterality: Left;  Left acromioclavicular joint arthrotomy    BACK SURGERY      benine tumor removal      forehead, age 9    CHOLECYSTECTOMY      KIDNEY SURGERY      LUMBAR FUSION Bilateral 3/2/2022    Procedure: FUSION, SPINE, LUMBAR;  Surgeon: Guille Templeton MD;  Location: [facility] OR [address] [place] [address];  Service: Neurosurgery;  Laterality: Bilateral;  AIRO  T10--Pelvis    LUMBAR FUSION N/A 1/24/2023    Procedure: **AIRO** T8-T12 POSTERIOR FUSION, T8-T10 LAMINECTOMY, HARDWARE REMOVAL AND WASHOUT;  Surgeon: Guille Templeton MD;  Location: [facility] OR [address] [place] [address];  Service: Neurosurgery;  Laterality: N/A;    REMOVAL OF HARDWARE FROM SPINE N/A 2/21/2022    Procedure: REMOVAL, HARDWARE, SPINE;  Surgeon: uGille Templeton MD;  Location: [facility] OR [address] [place] [address];  Service: Neurosurgery;  Laterality: N/A;  [place]    SPINE SURGERY      THORACIC LAMINECTOMY WITH FUSION N/A 2/2/2023    Procedure: LAMINECTOMY, SPINE, THORACIC, WITH FUSION (T4-Pelvis fusion w/ T9-10 corpectomies) **AIRO;  Surgeon: Guille Templeton MD;  Location: [facility] OR [address] [place] [address];  Service: Neurosurgery;  Laterality: N/A;  AIRO, 2 bovies, aquamantys, Globus, Depuy, antibiotic beads, TXA, Peroxide; [employer] plastics closure       Review of Systems:   As documented in primary team H&P    Home Meds:   Prior to Admission medications    Medication Sig Start Date End Date Taking? Authorizing Provider   albuterol (ACCUNEB) 1.25 mg/3 mL Nebu Take 3 mLs  (1.25 mg total) by nebulization every 6 (six) hours as needed (shortness of breath). Rescue 12/10/22 12/10/23  Farzana Elizabeth NP   buPROPion (WELLBUTRIN) 100 MG tablet Take 1 tablet (100 mg total) by mouth 2 (two) times daily. 11/22/22 11/22/23  HENRI Marcus   folic acid (FOLVITE) 1 MG tablet Take 1 tablet (1 mg total) by mouth once daily.  Patient not taking: Reported on 11/25/2022 11/22/22 1/3/23  Minnie Cortez MD   gabapentin (NEURONTIN) 300 MG capsule Take 1 capsule (300 mg total) by mouth 3 (three) times daily. 1/4/23 1/4/24  Dannielle Merino DO   lactulose (CHRONULAC) 20 gram/30 mL Soln Take 30 mLs (20 g total) by mouth 3 (three) times daily. 1/4/23 4/4/23  Dannielle Merino DO   pantoprazole (PROTONIX) 40 MG tablet Take 1 tablet (40 mg total) by mouth once daily. 3/23/22 11/25/22  Sai Red III, MD   diclofenac sodium (VOLTAREN) 1 % Gel Apply 2 g topically 4 (four) times daily. 12/31/21 3/11/22  Femi Galicia NP     Scheduled Meds:    acetaminophen  1,000 mg Oral Q12H    bisacodyL  10 mg Rectal Every other day    buPROPion  300 mg Oral Daily    dronabinoL  2.5 mg Oral BID    furosemide (LASIX) injection  80 mg Intravenous Q12H    gabapentin  900 mg Oral TID    heparin (porcine)  5,000 Units Subcutaneous Q8H    lactulose  20 g Oral TID    meropenem (MERREM) IVPB  2 g Intravenous Q8H    methocarbamoL  1,000 mg Oral Q6H    pantoprazole  40 mg Oral Daily    sodium chloride 0.9%  10 mL Intravenous Q6H    spironolactone  25 mg Oral Daily     Continuous Infusions:   PRN Meds:sodium chloride, sodium chloride, ALPRAZolam, dextrose 10%, dextrose 10%, glucagon (human recombinant), glucose, glucose, hydrALAZINE, HYDROmorphone, hydrOXYzine HCL, magnesium hydroxide 400 mg/5 ml, melatonin, naloxone, ondansetron, oxyCODONE **AND** oxyCODONE, polyethylene glycol, prochlorperazine, senna-docusate 8.6-50 mg, simethicone, sodium chloride 0.9%, Flushing PICC Protocol **AND** sodium  chloride 0.9% **AND** sodium chloride 0.9%  Anticoagulants/Antiplatelets: Heparin    Allergies:   Review of patient's allergies indicates:   Allergen Reactions    Bee venom protein (honey bee) Anaphylaxis     Patient reports she is allergic to bee stings.    Naproxen Anaphylaxis     Throat closing    12-23- Patient reports taking Ibuprofen 200 mg at home without problems. Verified X3. KS    Wasp sting [allergen ext-venom-honey bee] Anaphylaxis    Adhesive Blisters    Iodine and iodide containing products Hives     Allergic to iodine in seafood only    Shellfish containing products     Nuts [tree nut] Rash     Sedation Hx: have not been any systemic reactions    Vitals:  Temp: 98.4 °F (36.9 °C) (02/13/23 1521)  Pulse: (!) 111 (02/13/23 1449)  Resp: 18 (02/13/23 1439)  BP: (!) 119/56 (02/13/23 0934)  SpO2: 100 % (02/13/23 0800)     Physical Exam:  ASA: 3  Mallampati: n/a    General: no acute distress  Mental Status: alert and oriented to person, place and time  HEENT: normocephalic, atraumatic  Chest: unlabored breathing  Heart: regular heart rate  Abdomen: distended  Extremity: moves all extremities    Plan: ultrasound guided paracentesis  Sedation Plan: local    CÉSAR Nelson, HENRI  Interventional Radiology  [phone] [facility]

## 2023-02-14 PROBLEM — I89.8 CHYLOUS ASCITES: Status: ACTIVE | Noted: 2023-02-14

## 2023-02-14 LAB
ABO + RH BLD: NORMAL
ALBUMIN SERPL BCP-MCNC: 1.6 G/DL (ref 3.5–5.2)
ALBUMIN SERPL BCP-MCNC: 1.9 G/DL (ref 3.5–5.2)
ALP SERPL-CCNC: 130 U/L (ref 55–135)
ALT SERPL W/O P-5'-P-CCNC: 25 U/L (ref 10–44)
ANION GAP SERPL CALC-SCNC: 3 MMOL/L (ref 8–16)
ANION GAP SERPL CALC-SCNC: 4 MMOL/L (ref 8–16)
AST SERPL-CCNC: 36 U/L (ref 10–40)
BACTERIA SPEC AEROBE CULT: NO GROWTH
BASOPHILS # BLD AUTO: 0.03 K/UL (ref 0–0.2)
BASOPHILS # BLD AUTO: 0.03 K/UL (ref 0–0.2)
BASOPHILS NFR BLD: 0.7 % (ref 0–1.9)
BASOPHILS NFR BLD: 0.7 % (ref 0–1.9)
BILIRUB SERPL-MCNC: 1 MG/DL (ref 0.1–1)
BLD GP AB SCN CELLS X3 SERPL QL: NORMAL
BLD PROD TYP BPU: NORMAL
BLOOD UNIT EXPIRATION DATE: NORMAL
BLOOD UNIT TYPE CODE: 6200
BLOOD UNIT TYPE: NORMAL
BUN SERPL-MCNC: 31 MG/DL (ref 6–20)
BUN SERPL-MCNC: 33 MG/DL (ref 6–20)
CALCIUM SERPL-MCNC: 6.7 MG/DL (ref 8.7–10.5)
CALCIUM SERPL-MCNC: 7.7 MG/DL (ref 8.7–10.5)
CHLORIDE SERPL-SCNC: 102 MMOL/L (ref 95–110)
CHLORIDE SERPL-SCNC: 97 MMOL/L (ref 95–110)
CO2 SERPL-SCNC: 25 MMOL/L (ref 23–29)
CO2 SERPL-SCNC: 27 MMOL/L (ref 23–29)
CODING SYSTEM: NORMAL
CREAT SERPL-MCNC: 0.5 MG/DL (ref 0.5–1.4)
CREAT SERPL-MCNC: 0.5 MG/DL (ref 0.5–1.4)
CROSSMATCH INTERPRETATION: NORMAL
DIFFERENTIAL METHOD: ABNORMAL
DIFFERENTIAL METHOD: ABNORMAL
DISPENSE STATUS: NORMAL
EOSINOPHIL # BLD AUTO: 0.1 K/UL (ref 0–0.5)
EOSINOPHIL # BLD AUTO: 0.2 K/UL (ref 0–0.5)
EOSINOPHIL NFR BLD: 3.2 % (ref 0–8)
EOSINOPHIL NFR BLD: 3.9 % (ref 0–8)
ERYTHROCYTE [DISTWIDTH] IN BLOOD BY AUTOMATED COUNT: 18.7 % (ref 11.5–14.5)
ERYTHROCYTE [DISTWIDTH] IN BLOOD BY AUTOMATED COUNT: 19 % (ref 11.5–14.5)
EST. GFR  (NO RACE VARIABLE): >60 ML/MIN/1.73 M^2
EST. GFR  (NO RACE VARIABLE): >60 ML/MIN/1.73 M^2
GLUCOSE SERPL-MCNC: 81 MG/DL (ref 70–110)
GLUCOSE SERPL-MCNC: 93 MG/DL (ref 70–110)
HCT VFR BLD AUTO: 22.7 % (ref 37–48.5)
HCT VFR BLD AUTO: 25.7 % (ref 37–48.5)
HGB BLD-MCNC: 6.9 G/DL (ref 12–16)
HGB BLD-MCNC: 7.9 G/DL (ref 12–16)
IMM GRANULOCYTES # BLD AUTO: 0.02 K/UL (ref 0–0.04)
IMM GRANULOCYTES # BLD AUTO: 0.02 K/UL (ref 0–0.04)
IMM GRANULOCYTES NFR BLD AUTO: 0.5 % (ref 0–0.5)
IMM GRANULOCYTES NFR BLD AUTO: 0.5 % (ref 0–0.5)
LACTATE SERPL-SCNC: 1.2 MMOL/L (ref 0.5–2.2)
LYMPHOCYTES # BLD AUTO: 0.5 K/UL (ref 1–4.8)
LYMPHOCYTES # BLD AUTO: 0.6 K/UL (ref 1–4.8)
LYMPHOCYTES NFR BLD: 11.1 % (ref 18–48)
LYMPHOCYTES NFR BLD: 14.5 % (ref 18–48)
MAGNESIUM SERPL-MCNC: 1.8 MG/DL (ref 1.6–2.6)
MCH RBC QN AUTO: 28.7 PG (ref 27–31)
MCH RBC QN AUTO: 28.8 PG (ref 27–31)
MCHC RBC AUTO-ENTMCNC: 30.4 G/DL (ref 32–36)
MCHC RBC AUTO-ENTMCNC: 30.7 G/DL (ref 32–36)
MCV RBC AUTO: 94 FL (ref 82–98)
MCV RBC AUTO: 95 FL (ref 82–98)
MONOCYTES # BLD AUTO: 0.5 K/UL (ref 0.3–1)
MONOCYTES # BLD AUTO: 0.7 K/UL (ref 0.3–1)
MONOCYTES NFR BLD: 11.8 % (ref 4–15)
MONOCYTES NFR BLD: 15.7 % (ref 4–15)
NEUTROPHILS # BLD AUTO: 2.7 K/UL (ref 1.8–7.7)
NEUTROPHILS # BLD AUTO: 3.1 K/UL (ref 1.8–7.7)
NEUTROPHILS NFR BLD: 64.7 % (ref 38–73)
NEUTROPHILS NFR BLD: 72.7 % (ref 38–73)
NRBC BLD-RTO: 0 /100 WBC
NRBC BLD-RTO: 0 /100 WBC
NUM UNITS TRANS PACKED RBC: NORMAL
OSMOLALITY UR: 639 MOSM/KG (ref 50–1200)
PHOSPHATE SERPL-MCNC: 2.8 MG/DL (ref 2.7–4.5)
PHOSPHATE SERPL-MCNC: 3.4 MG/DL (ref 2.7–4.5)
PLATELET # BLD AUTO: 103 K/UL (ref 150–450)
PLATELET # BLD AUTO: 105 K/UL (ref 150–450)
PMV BLD AUTO: 9.4 FL (ref 9.2–12.9)
PMV BLD AUTO: 9.7 FL (ref 9.2–12.9)
POCT GLUCOSE: 98 MG/DL (ref 70–110)
POTASSIUM SERPL-SCNC: 3.9 MMOL/L (ref 3.5–5.1)
POTASSIUM SERPL-SCNC: 4.3 MMOL/L (ref 3.5–5.1)
PROT SERPL-MCNC: 4.3 G/DL (ref 6–8.4)
RBC # BLD AUTO: 2.4 M/UL (ref 4–5.4)
RBC # BLD AUTO: 2.75 M/UL (ref 4–5.4)
SODIUM SERPL-SCNC: 128 MMOL/L (ref 136–145)
SODIUM SERPL-SCNC: 130 MMOL/L (ref 136–145)
SODIUM UR-SCNC: <10 MMOL/L (ref 20–250)
TRIGL FLD-MCNC: 380 MG/DL
WBC # BLD AUTO: 4.14 K/UL (ref 3.9–12.7)
WBC # BLD AUTO: 4.32 K/UL (ref 3.9–12.7)

## 2023-02-14 PROCEDURE — P9040 RBC LEUKOREDUCED IRRADIATED: HCPCS | Performed by: HOSPITALIST

## 2023-02-14 PROCEDURE — 94761 N-INVAS EAR/PLS OXIMETRY MLT: CPT

## 2023-02-14 PROCEDURE — 63600175 PHARM REV CODE 636 W HCPCS: Performed by: NURSE PRACTITIONER

## 2023-02-14 PROCEDURE — 99024 POSTOP FOLLOW-UP VISIT: CPT | Mod: ,,, | Performed by: PHYSICIAN ASSISTANT

## 2023-02-14 PROCEDURE — 99232 PR SUBSEQUENT HOSPITAL CARE,LEVL II: ICD-10-PCS | Mod: ,,, | Performed by: INTERNAL MEDICINE

## 2023-02-14 PROCEDURE — 82272 OCCULT BLD FECES 1-3 TESTS: CPT | Performed by: HOSPITALIST

## 2023-02-14 PROCEDURE — 63600175 PHARM REV CODE 636 W HCPCS: Performed by: STUDENT IN AN ORGANIZED HEALTH CARE EDUCATION/TRAINING PROGRAM

## 2023-02-14 PROCEDURE — 25000003 PHARM REV CODE 250: Performed by: STUDENT IN AN ORGANIZED HEALTH CARE EDUCATION/TRAINING PROGRAM

## 2023-02-14 PROCEDURE — 99233 SBSQ HOSP IP/OBS HIGH 50: CPT | Mod: ,,, | Performed by: HOSPITALIST

## 2023-02-14 PROCEDURE — 84100 ASSAY OF PHOSPHORUS: CPT | Performed by: STUDENT IN AN ORGANIZED HEALTH CARE EDUCATION/TRAINING PROGRAM

## 2023-02-14 PROCEDURE — 25000003 PHARM REV CODE 250: Performed by: PSYCHIATRY & NEUROLOGY

## 2023-02-14 PROCEDURE — 85025 COMPLETE CBC W/AUTO DIFF WBC: CPT

## 2023-02-14 PROCEDURE — 63600175 PHARM REV CODE 636 W HCPCS

## 2023-02-14 PROCEDURE — 86900 BLOOD TYPING SEROLOGIC ABO: CPT | Performed by: HOSPITALIST

## 2023-02-14 PROCEDURE — 99231 SBSQ HOSP IP/OBS SF/LOW 25: CPT | Mod: AF,HB,, | Performed by: PSYCHIATRY & NEUROLOGY

## 2023-02-14 PROCEDURE — 84300 ASSAY OF URINE SODIUM: CPT | Performed by: STUDENT IN AN ORGANIZED HEALTH CARE EDUCATION/TRAINING PROGRAM

## 2023-02-14 PROCEDURE — 20600001 HC STEP DOWN PRIVATE ROOM

## 2023-02-14 PROCEDURE — 99233 PR SUBSEQUENT HOSPITAL CARE,LEVL III: ICD-10-PCS | Mod: ,,, | Performed by: HOSPITALIST

## 2023-02-14 PROCEDURE — 25000003 PHARM REV CODE 250: Performed by: HOSPITALIST

## 2023-02-14 PROCEDURE — 83935 ASSAY OF URINE OSMOLALITY: CPT | Performed by: STUDENT IN AN ORGANIZED HEALTH CARE EDUCATION/TRAINING PROGRAM

## 2023-02-14 PROCEDURE — 99024 PR POST-OP FOLLOW-UP VISIT: ICD-10-PCS | Mod: ,,, | Performed by: PHYSICIAN ASSISTANT

## 2023-02-14 PROCEDURE — 83605 ASSAY OF LACTIC ACID: CPT | Performed by: HOSPITALIST

## 2023-02-14 PROCEDURE — 25000003 PHARM REV CODE 250: Performed by: INTERNAL MEDICINE

## 2023-02-14 PROCEDURE — 25000003 PHARM REV CODE 250

## 2023-02-14 PROCEDURE — A4216 STERILE WATER/SALINE, 10 ML: HCPCS | Performed by: PSYCHIATRY & NEUROLOGY

## 2023-02-14 PROCEDURE — 99232 SBSQ HOSP IP/OBS MODERATE 35: CPT | Mod: ,,, | Performed by: INTERNAL MEDICINE

## 2023-02-14 PROCEDURE — 36415 COLL VENOUS BLD VENIPUNCTURE: CPT | Performed by: STUDENT IN AN ORGANIZED HEALTH CARE EDUCATION/TRAINING PROGRAM

## 2023-02-14 PROCEDURE — 36430 TRANSFUSION BLD/BLD COMPNT: CPT

## 2023-02-14 PROCEDURE — 86920 COMPATIBILITY TEST SPIN: CPT | Performed by: HOSPITALIST

## 2023-02-14 PROCEDURE — 80069 RENAL FUNCTION PANEL: CPT | Performed by: HOSPITALIST

## 2023-02-14 PROCEDURE — 99231 PR SUBSEQUENT HOSPITAL CARE,LEVL I: ICD-10-PCS | Mod: AF,HB,, | Performed by: PSYCHIATRY & NEUROLOGY

## 2023-02-14 PROCEDURE — 63600175 PHARM REV CODE 636 W HCPCS: Performed by: HOSPITALIST

## 2023-02-14 PROCEDURE — 85025 COMPLETE CBC W/AUTO DIFF WBC: CPT | Mod: 91 | Performed by: HOSPITALIST

## 2023-02-14 PROCEDURE — 83735 ASSAY OF MAGNESIUM: CPT | Performed by: STUDENT IN AN ORGANIZED HEALTH CARE EDUCATION/TRAINING PROGRAM

## 2023-02-14 PROCEDURE — 80053 COMPREHEN METABOLIC PANEL: CPT | Performed by: STUDENT IN AN ORGANIZED HEALTH CARE EDUCATION/TRAINING PROGRAM

## 2023-02-14 PROCEDURE — 27000207 HC ISOLATION

## 2023-02-14 RX ORDER — DIPHENHYDRAMINE HCL 25 MG
25 CAPSULE ORAL
Status: DISCONTINUED | OUTPATIENT
Start: 2023-02-14 | End: 2023-02-28 | Stop reason: HOSPADM

## 2023-02-14 RX ORDER — HYDROCODONE BITARTRATE AND ACETAMINOPHEN 500; 5 MG/1; MG/1
TABLET ORAL
Status: DISCONTINUED | OUTPATIENT
Start: 2023-02-14 | End: 2023-02-20

## 2023-02-14 RX ADMIN — ACETAMINOPHEN 1000 MG: 500 TABLET ORAL at 10:02

## 2023-02-14 RX ADMIN — HYDROMORPHONE HYDROCHLORIDE 0.5 MG: 1 INJECTION, SOLUTION INTRAMUSCULAR; INTRAVENOUS; SUBCUTANEOUS at 07:02

## 2023-02-14 RX ADMIN — MEROPENEM 2 G: 1 INJECTION INTRAVENOUS at 10:02

## 2023-02-14 RX ADMIN — SPIRONOLACTONE 25 MG: 25 TABLET, FILM COATED ORAL at 09:02

## 2023-02-14 RX ADMIN — FUROSEMIDE 80 MG: 10 INJECTION, SOLUTION INTRAMUSCULAR; INTRAVENOUS at 10:02

## 2023-02-14 RX ADMIN — GABAPENTIN 900 MG: 300 CAPSULE ORAL at 09:02

## 2023-02-14 RX ADMIN — HYDROXYZINE HYDROCHLORIDE 25 MG: 25 TABLET, FILM COATED ORAL at 05:02

## 2023-02-14 RX ADMIN — DRONABINOL 2.5 MG: 2.5 CAPSULE ORAL at 09:02

## 2023-02-14 RX ADMIN — MEROPENEM 2 G: 1 INJECTION INTRAVENOUS at 01:02

## 2023-02-14 RX ADMIN — PANTOPRAZOLE SODIUM 40 MG: 40 TABLET, DELAYED RELEASE ORAL at 09:02

## 2023-02-14 RX ADMIN — HEPARIN SODIUM 5000 UNITS: 5000 INJECTION INTRAVENOUS; SUBCUTANEOUS at 05:02

## 2023-02-14 RX ADMIN — METHOCARBAMOL 1000 MG: 500 TABLET ORAL at 12:02

## 2023-02-14 RX ADMIN — LACTULOSE 20 G: 20 SOLUTION ORAL at 10:02

## 2023-02-14 RX ADMIN — OXYCODONE 5 MG: 5 TABLET ORAL at 05:02

## 2023-02-14 RX ADMIN — HYDROMORPHONE HYDROCHLORIDE 0.5 MG: 1 INJECTION, SOLUTION INTRAMUSCULAR; INTRAVENOUS; SUBCUTANEOUS at 03:02

## 2023-02-14 RX ADMIN — PREDNISONE 50 MG: 20 TABLET ORAL at 06:02

## 2023-02-14 RX ADMIN — BUPROPION HYDROCHLORIDE 150 MG: 150 TABLET, FILM COATED, EXTENDED RELEASE ORAL at 09:02

## 2023-02-14 RX ADMIN — HEPARIN SODIUM 5000 UNITS: 5000 INJECTION INTRAVENOUS; SUBCUTANEOUS at 10:02

## 2023-02-14 RX ADMIN — Medication 10 ML: at 12:02

## 2023-02-14 RX ADMIN — FUROSEMIDE 80 MG: 10 INJECTION, SOLUTION INTRAMUSCULAR; INTRAVENOUS at 09:02

## 2023-02-14 RX ADMIN — OXYCODONE HYDROCHLORIDE 10 MG: 10 TABLET ORAL at 10:02

## 2023-02-14 RX ADMIN — PREDNISONE 50 MG: 20 TABLET ORAL at 12:02

## 2023-02-14 RX ADMIN — Medication 10 ML: at 06:02

## 2023-02-14 RX ADMIN — GABAPENTIN 900 MG: 300 CAPSULE ORAL at 10:02

## 2023-02-14 RX ADMIN — LACTULOSE 20 G: 20 SOLUTION ORAL at 03:02

## 2023-02-14 RX ADMIN — ACETAMINOPHEN 1000 MG: 500 TABLET ORAL at 09:02

## 2023-02-14 RX ADMIN — DRONABINOL 2.5 MG: 2.5 CAPSULE ORAL at 10:02

## 2023-02-14 RX ADMIN — METHOCARBAMOL 1000 MG: 500 TABLET ORAL at 05:02

## 2023-02-14 RX ADMIN — HYDROXYZINE HYDROCHLORIDE 25 MG: 25 TABLET, FILM COATED ORAL at 09:02

## 2023-02-14 RX ADMIN — GABAPENTIN 900 MG: 300 CAPSULE ORAL at 03:02

## 2023-02-14 RX ADMIN — HYDROMORPHONE HYDROCHLORIDE 0.5 MG: 1 INJECTION, SOLUTION INTRAMUSCULAR; INTRAVENOUS; SUBCUTANEOUS at 12:02

## 2023-02-14 RX ADMIN — DULOXETINE 30 MG: 30 CAPSULE, DELAYED RELEASE ORAL at 10:02

## 2023-02-14 RX ADMIN — DULOXETINE 30 MG: 30 CAPSULE, DELAYED RELEASE ORAL at 09:02

## 2023-02-14 RX ADMIN — MEROPENEM 2 G: 1 INJECTION INTRAVENOUS at 06:02

## 2023-02-14 NOTE — SUBJECTIVE & OBJECTIVE
Interval History: 2.7 liters UOP and serum sodium corrected to 130.     Review of patient's allergies indicates:   Allergen Reactions    Bee venom protein (honey bee) Anaphylaxis     Patient reports she is allergic to bee stings.    Naproxen Anaphylaxis     Throat closing    12-23- Patient reports taking Ibuprofen 200 mg at home without problems. Verified X3. KS    Wasp sting [allergen ext-venom-honey bee] Anaphylaxis    Adhesive Blisters    Iodine and iodide containing products Hives     Allergic to iodine in seafood only    Shellfish containing products     Nuts [tree nut] Rash     Current Facility-Administered Medications   Medication Frequency    0.9%  NaCl infusion (for blood administration) Q24H PRN    0.9%  NaCl infusion (for blood administration) Q24H PRN    0.9%  NaCl infusion (for blood administration) Q24H PRN    acetaminophen tablet 1,000 mg Q12H    bisacodyL suppository 10 mg Every other day    buPROPion TB24 tablet 150 mg Daily    dextrose 10% bolus 125 mL 125 mL PRN    dextrose 10% bolus 250 mL 250 mL PRN    dronabinoL capsule 2.5 mg BID    DULoxetine DR capsule 30 mg BID    furosemide injection 80 mg Q12H    gabapentin capsule 900 mg TID    glucagon (human recombinant) injection 1 mg PRN    glucose chewable tablet 16 g PRN    glucose chewable tablet 24 g PRN    heparin (porcine) injection 5,000 Units Q8H    hydrALAZINE tablet 25 mg Q8H PRN    HYDROmorphone injection 0.5 mg Q4H PRN    hydrOXYzine HCL tablet 25 mg TID PRN    lactulose 20 gram/30 mL solution Soln 20 g TID    magnesium hydroxide 400 mg/5 ml suspension 2,400 mg Daily PRN    melatonin tablet 6 mg Nightly PRN    meropenem (MERREM) 2 g in sodium chloride 0.9% 100 mL IVPB Q8H    methocarbamoL tablet 1,000 mg Q6H    naloxone 0.4 mg/mL injection 0.02 mg PRN    ondansetron injection 4 mg Q6H PRN    oxyCODONE immediate release tablet 5 mg Q3H PRN    And    oxyCODONE immediate release tablet Tab 10 mg Q3H PRN    pantoprazole EC tablet 40 mg Daily     polyethylene glycol packet 17 g Daily PRN    prochlorperazine injection Soln 2.5 mg Q6H PRN    senna-docusate 8.6-50 mg per tablet 1 tablet BID PRN    simethicone chewable tablet 80 mg TID PRN    sodium chloride 0.9% flush 10 mL Q12H PRN    sodium chloride 0.9% flush 10 mL Q6H    And    sodium chloride 0.9% flush 10 mL PRN    spironolactone tablet 25 mg Daily       Objective:     Vital Signs (Most Recent):  Temp: 97 °F (36.1 °C) (02/14/23 0812)  Pulse: 103 (02/14/23 0812)  Resp: 18 (02/14/23 0812)  BP: (!) 105/53 (02/14/23 0812)  SpO2: 100 % (02/14/23 0812)   Vital Signs (24h Range):  Temp:  [97 °F (36.1 °C)-98.4 °F (36.9 °C)] 97 °F (36.1 °C)  Pulse:  [101-118] 103  Resp:  [14-20] 18  SpO2:  [96 %-100 %] 100 %  BP: ()/(53-57) 105/53     Weight: (!) 140.7 kg (310 lb 3 oz) (02/14/23 0600)  Body mass index is 48.58 kg/m².  Body surface area is 2.58 meters squared.    I/O last 3 completed shifts:  In: 760 [P.O.:760]  Out: 3377 [Urine:2250; Drains:1125; Stool:2]    Physical Exam  Constitutional:       General: She is not in acute distress.     Appearance: Normal appearance. She is obese. She is not ill-appearing.   HENT:      Head: Normocephalic and atraumatic.   Eyes:      General: No scleral icterus.  Cardiovascular:      Rate and Rhythm: Normal rate.      Pulses: Normal pulses.   Pulmonary:      Effort: Pulmonary effort is normal.   Abdominal:      General: There is distension.      Tenderness: There is abdominal tenderness. There is no guarding.   Musculoskeletal:      Right lower leg: Edema present.      Left lower leg: Edema present.   Skin:     General: Skin is warm and dry.      Coloration: Skin is not jaundiced.   Neurological:      Mental Status: She is alert and oriented to person, place, and time.   Psychiatric:         Behavior: Behavior normal.       Significant Labs:  All labs within the past 24 hours have been reviewed.     Significant Imaging:  Labs: Reviewed

## 2023-02-14 NOTE — PROGRESS NOTES
".CONSULTATION LIAISON PSYCHIATRY PROGRESS NOTE    Patient Name: Rachel Zazueta  MRN: [id number]  Patient Class: IP- Inpatient  Admission Date: 1/19/2023  Attending Physician: Vincent Bal MD      SUBJECTIVE:   Rachel Zazueta is a 42 y.o. female with past psychiatric history of Major Depressive Disorder diagnosed two years ago & past pertinent medical history of chronic HCV with cirrhosis, IV drug abuse (methamphetamine) spinal fusion with hardware (T10 - Pelvis / 03/2022), obesity (BMI 39.3) and neurogenic bladder and recent shoulder surgery who initially presented to [facility] for evaluation of back pain and leg weakness. OSH course notable for concerning urinalysis and blood cultures positive for ESBL E coli treated with meropenem.  She was also admit to the ICU where she was treated with Precedex for significant body aches, irritability, and muscle spasms.     Patient requested psychiatry consult because of hopelessness thoughts, anxiety and depression.    Today, we were unable to complete full psychiatric interview because patient was somnolent and unable to stay awake during questioning. Possibly caused by receiving PRN Hydroxyzine overnight and twice this morning. Patient reported insomnia last night for which she requested the hydroxyzine. Reports good appetite. Denies suicidal ideation, homicidal ideas, delusions or paranoia.       OBJECTIVE:    Mental Status Exam:  Appearance: unremarkable, age appropriate, obese  Behavior/Cooperation: eye contact minimal, drowsy, unable to participate fully  Speech: slowed, delayed, increased latency of response, soft  Mood: "fine"  Affect: blunted  Thought Process: could not be assessed because patient was sleepy  Thought Content:  unable to assess    Orientation: could not be assessed  Memory: Unable to assess  Attention Span/Concentration: unable to assess  Insight:  unable to assess  Judgment:  unable to assess    CAM ICU positive? no      ASSESSMENT " & RECOMMENDATIONS     Major Depressive Disorder, moderate to severe  Methamphetamine use disorder, severe  Unspecified anxiety d/o     Continue Bupropion 150 mg PO Daily   Continue Duloxetine 30 mg PO BID to address mood and anxiety with added benefit of assistance with chronic pain  Continue PRN Hydroxyzine 25 mg PO q8h for anxiety    RISK ASSESSMENT  NO NEED FOR PEC     FOLLOW UP  Psychiatry will sign off. Thank you for allowing us to participate in the care of this patient.    DISPOSITION - once medically cleared:  Defer to medical team    Please contact ON CALL psychiatry service (24/7) for any acute issues that may arise.      Patient was seen and evaluated by me, chart reviewed. MS4 Jessie Domingo interviewed the patient first and then I performed my assessment - our findings are integrated above. We discussed the patient's evaluation and devised an assessment and plan. The student documented parts of the note with supervising and editing.    Dr. Carlin Sosa   Psychiatry  [facility]  2/14/2023 10:43 AM        --------------------------------------------------------------------------------------------------------------------------------------------------------------------------------------------------------------------------------------    CONTINUED OBJECTIVE clinical data & findings reviewed and noted for above decision making    Current Medications:   Scheduled Meds:    acetaminophen  1,000 mg Oral Q12H    bisacodyL  10 mg Rectal Every other day    buPROPion  150 mg Oral Daily    dronabinoL  2.5 mg Oral BID    DULoxetine  30 mg Oral BID    furosemide (LASIX) injection  80 mg Intravenous Q12H    gabapentin  900 mg Oral TID    heparin (porcine)  5,000 Units Subcutaneous Q8H    lactulose  20 g Oral TID    meropenem (MERREM) IVPB  2 g Intravenous Q8H    methocarbamoL  1,000 mg Oral Q6H    pantoprazole  40 mg Oral Daily    sodium chloride 0.9%  10 mL Intravenous Q6H    spironolactone   25 mg Oral Daily     PRN Meds: sodium chloride, sodium chloride, sodium chloride, dextrose 10%, dextrose 10%, diphenhydrAMINE, glucagon (human recombinant), glucose, glucose, hydrALAZINE, HYDROmorphone, hydrOXYzine HCL, magnesium hydroxide 400 mg/5 ml, melatonin, naloxone, ondansetron, oxyCODONE **AND** oxyCODONE, polyethylene glycol, predniSONE, prochlorperazine, senna-docusate 8.6-50 mg, simethicone, sodium chloride 0.9%, Flushing PICC Protocol **AND** sodium chloride 0.9% **AND** sodium chloride 0.9%    Allergies:   Review of patient's allergies indicates:   Allergen Reactions    Bee venom protein (honey bee) Anaphylaxis     Patient reports she is allergic to bee stings.    Naproxen Anaphylaxis     Throat closing    12-23- Patient reports taking Ibuprofen 200 mg at home without problems. Verified X3. [place]    Wasp sting [allergen ext-venom-honey bee] Anaphylaxis    Adhesive Blisters    Iodine and iodide containing products Hives     Allergic to iodine in seafood only    Shellfish containing products     Nuts [tree nut] Rash       Vitals  Vitals:    02/14/23 0812   BP: (!) 105/53   Pulse: 103   Resp: 18   Temp: 97 °F (36.1 °C)       Labs/Imaging/Studies:  Recent Results (from the past 24 hour(s))   Amylase, Peritoneal, Pleural Fluid or MIHIR Drainage, In-House Ascites    Collection Time: 02/13/23  3:29 PM   Result Value Ref Range    Body Fluid Source Amylase Ascites     Amylase, Fluid 34 Not established U/L   Fungus culture    Collection Time: 02/13/23  3:29 PM    Specimen: Ascites   Result Value Ref Range    Fungus (Mycology) Culture Culture in progress    WBC & Diff,Body Fluid Ascites    Collection Time: 02/13/23  3:29 PM   Result Value Ref Range    Body Fluid Type Ascites     Fluid Appearance Turbid     Fluid Color Colorless     WBC, Body Fluid 156 /cu mm    Segs, Fluid 23 %    Lymphs, Fluid 53 %    Monocytes/Macrophages, Fluid 22 %    Mesothelial Cells, Fluid 2 %   Aerobic culture    Collection Time: 02/13/23  3:29  PM    Specimen: Ascites   Result Value Ref Range    Aerobic Bacterial Culture No growth    Culture, Anaerobic    Collection Time: 02/13/23  3:29 PM    Specimen: Ascites   Result Value Ref Range    Anaerobic Culture Culture in progress    Gram stain    Collection Time: 02/13/23  3:29 PM    Specimen: Ascites   Result Value Ref Range    Gram Stain Result Rare WBC's     Gram Stain Result No organisms seen    Albumin, Peritoneal, Pleural Fluid or MIHIR Drainage, In-House Ascites    Collection Time: 02/13/23  3:29 PM   Result Value Ref Range    Body Fluid Source, Albumin Ascites     Body Fluid, Albumin <0.4 See text g/dL   Protein, Peritoneal, Pleural Fluid or MIHIR Drainage, In-House Ascites    Collection Time: 02/13/23  3:29 PM   Result Value Ref Range    Body Fluid Source, Total Protein Ascites     Body Fluid, Protein <1.0 Not established g/dL   LDH, Peritoneal, Pleural Fluid or MIHIR Drainage, In-House Ascites    Collection Time: 02/13/23  3:29 PM   Result Value Ref Range    Body Fluid Source, LDH Ascites     LD, Fluid 31 Not established U/L   WBC & Diff,Body Fluid Ascites    Collection Time: 02/13/23  3:29 PM   Result Value Ref Range    Body Fluid Type Ascites     Fluid Appearance Turbid     Fluid Color Colorless     WBC, Body Fluid 182 /cu mm    Segs, Fluid 22 %    Lymphs, Fluid 49 %    Monocytes/Macrophages, Fluid 28 %    Mesothelial Cells, Fluid 1 %   LDH, Peritoneal, Pleural Fluid or MIHIR Drainage, In-House Ascites    Collection Time: 02/13/23  3:29 PM   Result Value Ref Range    Body Fluid Source, LDH Ascites     LD, Fluid 34 Not established U/L   Protein, Peritoneal, Pleural Fluid or MIHIR Drainage, In-House Ascites    Collection Time: 02/13/23  3:29 PM   Result Value Ref Range    Body Fluid Source, Total Protein Ascites     Body Fluid, Protein <1.0 Not established g/dL   Glucose, Peritoneal, Pleural Fluid or MIHIR Drainage, In-House Ascites    Collection Time: 02/13/23  3:29 PM   Result Value Ref Range    Body Fluid Source,  Glucose Ascites     Glucose, Fluid 120 Not established mg/dL   Renal function panel    Collection Time: 02/13/23  8:55 PM   Result Value Ref Range    Glucose 123 (H) 70 - 110 mg/dL    Sodium 127 (L) 136 - 145 mmol/L    Potassium 4.2 3.5 - 5.1 mmol/L    Chloride 97 95 - 110 mmol/L    CO2 26 23 - 29 mmol/L    BUN 33 (H) 6 - 20 mg/dL    Calcium 7.5 (L) 8.7 - 10.5 mg/dL    Creatinine 0.6 0.5 - 1.4 mg/dL    Albumin 1.7 (L) 3.5 - 5.2 g/dL    Phosphorus 2.9 2.7 - 4.5 mg/dL    eGFR >60.0 >60 mL/min/1.73 m^2    Anion Gap 4 (L) 8 - 16 mmol/L   Comprehensive Metabolic Panel    Collection Time: 02/14/23  3:40 AM   Result Value Ref Range    Sodium 130 (L) 136 - 145 mmol/L    Potassium 3.9 3.5 - 5.1 mmol/L    Chloride 102 95 - 110 mmol/L    CO2 25 23 - 29 mmol/L    Glucose 93 70 - 110 mg/dL    BUN 31 (H) 6 - 20 mg/dL    Creatinine 0.5 0.5 - 1.4 mg/dL    Calcium 6.7 (LL) 8.7 - 10.5 mg/dL    Total Protein 4.3 (L) 6.0 - 8.4 g/dL    Albumin 1.6 (L) 3.5 - 5.2 g/dL    Total Bilirubin 1.0 0.1 - 1.0 mg/dL    Alkaline Phosphatase 130 55 - 135 U/L    AST 36 10 - 40 U/L    ALT 25 10 - 44 U/L    Anion Gap 3 (L) 8 - 16 mmol/L    eGFR >60.0 >60 mL/min/1.73 m^2   Magnesium    Collection Time: 02/14/23  3:40 AM   Result Value Ref Range    Magnesium 1.8 1.6 - 2.6 mg/dL   Phosphorus    Collection Time: 02/14/23  3:40 AM   Result Value Ref Range    Phosphorus 2.8 2.7 - 4.5 mg/dL   CBC Auto Differential    Collection Time: 02/14/23  3:40 AM   Result Value Ref Range    WBC 4.14 3.90 - 12.70 K/uL    RBC 2.40 (L) 4.00 - 5.40 M/uL    Hemoglobin 6.9 (L) 12.0 - 16.0 g/dL    Hematocrit 22.7 (L) 37.0 - 48.5 %    MCV 95 82 - 98 fL    MCH 28.8 27.0 - 31.0 pg    MCHC 30.4 (L) 32.0 - 36.0 g/dL    RDW 19.0 (H) 11.5 - 14.5 %    Platelets 103 (L) 150 - 450 K/uL    MPV 9.7 9.2 - 12.9 fL    Immature Granulocytes 0.5 0.0 - 0.5 %    Gran # (ANC) 2.7 1.8 - 7.7 K/uL    Immature Grans (Abs) 0.02 0.00 - 0.04 K/uL    Lymph # 0.6 (L) 1.0 - 4.8 K/uL    Mono # 0.7 0.3 - 1.0  K/uL    Eos # 0.2 0.0 - 0.5 K/uL    Baso # 0.03 0.00 - 0.20 K/uL    nRBC 0 0 /100 WBC    Gran % 64.7 38.0 - 73.0 %    Lymph % 14.5 (L) 18.0 - 48.0 %    Mono % 15.7 (H) 4.0 - 15.0 %    Eosinophil % 3.9 0.0 - 8.0 %    Basophil % 0.7 0.0 - 1.9 %    Differential Method Automated

## 2023-02-14 NOTE — ASSESSMENT & PLAN NOTE
Pt is 42F multiple spinal surgeries and revisions last T10- Pelvis in March 2022 who presented to OSH with concern for shoulder infection and UTI found to have E coli sepsis who has had progressive worsening BLE weakness. 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 Transferred to [facility] to  and neurosurgery was consulted.    OR 1/24 for temporary T6-12 PSIF. Taz pus noted intraoperatively.   OR 2/2 for T4-pelvis revision and extension of fusion with T9-10 corpectomy with plastic surgery assistance.  Maps goals > 85 completed 2/5 in ICU.    Plan:  --Stepped down to  floor.   -q4h neurochecks.  --All labs & diagnostics reviewed.   -Post op xrays pending. Ordered, ok to obtain supine today. Will try to obtain upright prior to discharge.  --TLSO to be worn when OOB only.  --MIHIR drains x4 to gravity, monitor output qshift. Per plastics - keep all drains until discharge -- management of drains per plastics. Following peripherally.  --Plastic surgery managing incision. Open to air.   - Off-load pressure as much as possible to promote wound healing.   - Elevate with wedge, alternate sides Q2H  --Thoracic discitis: BCx 1/20 no growth. OR cultures 1/24 grew ESBL E. coli   -- ID consulted. Recs for IV meropenem with estimated end date of 3/29/23.  --Pain Control: Continue multimodal regimen. PCA pump discontinued 2/8 with poor pain control subsequently.    - Anesthesia Pain Service consulted due to refractory pain, appreciate assistance. Continue current regimen.    - No NSAIDs as pt s/p recent instrumented fusion.  --Acute blood loss anemia: Goal Hbg > 7. Transfuse PRN.  --DVT PPx: SQH/TEDs/SCDs.  --US BLE 2/13 negative for DVT but showed nonspecific fluid collection adjacent to L iliac vein.   - CT A/P w/ contrast for further evaluation   --NSGY will continue to follow. Please contact team with any further questions.

## 2023-02-14 NOTE — PROGRESS NOTES
"[facility] - Telemetry Stepdown  Nephrology  Progress Note    Patient Name: Rachel Zazueta  MRN: [id number]  Admission Date: 1/19/2023  Hospital Length of Stay: 26 days  Attending Provider: Vincent Bal MD   Primary Care Physician: Dannielle Merino DO  Principal Problem:Weakness of both lower extremities    Subjective:     HPI: Per HM note: "42 y.o F w/ hx ofIV drug use (methamphetamine), chronic HCV with cirrhosis, spinal fusion (04/2021), epidural abscess with removal of hardware (02/2022), spinal fusion with hardware (T10 - Pelvis / 03/2022), obesity (BMI 39.3) and neurogenic bladder and recent shoulder surgery who initially presented to [facility] for evaluation of back pain and left leg weakness. She reports initially noting the left leg weakness when she was about to go to the bathroom and was about to fall but was assisted by her boyfriend.  She was brought to the ED via EMS. Urinalysis with UTI concerns and blood cultures at OSH grew ESBL E coli, and shoulder effusion concern for infection so she was treated with meropenem.  During hospitalization, she reports the weakness that started out in her left leg initially then spread upwards to her left thigh, left hip, then crossed over to the right hip and spread to her right leg.  Denies recent IV drug use. She reports her last incidence of drug use was more than 3 years ago and also denies tobacco, and alcohol abuse.  Reports cannabis use.     OSH course notable for concerning urinalysis and blood cultures positive for ESBL E coli treated with meropenem.  She was also admit to the ICU where she was treated with Precedex for significant body aches, irritability, and muscle spasms.  Concerns of a murmur on physical exam so she underwent TTE which did not show any vegetations.  Worsening lower extremity weakness workup with MRI head nonspecific, MRI cervical spine with multilevel spondylosis, X-ray spine with multilevel thoracolumbar fusion and " "diskectomy, focal kyphosis at T9-10 increased compared to prior.  She was started on prophylaxis amoxicillin due to her history of infected hardware.  MRI spine unable to be completed due to patient's body habitus so she was transferred to [facility] for further imaging and Neurosurgery, Neurology evaluation." Underwent laminectomy, posterior fusion and washout on 1/24. ID following for discitis, has her on meropenem. Stepped down to  on 2/8.     Nephrology consulted for hyponatremia. Patient reports uncomfortable abdominal distension and leg swelling. Reports drinking 3-4 cups of her 30oz water tumbler daily. Urine Na <10, urine osm 513. Serum Na trend 136--> 132-->131-->129-->127-->135-->134-->127. Normal mentation. Cr/BUN wnl. Albumin 1.8, protein 4.5, bili 1.4.          Interval History: 2.7 liters UOP and serum sodium corrected to 130.     Review of patient's allergies indicates:   Allergen Reactions    Bee venom protein (honey bee) Anaphylaxis     Patient reports she is allergic to bee stings.    Naproxen Anaphylaxis     Throat closing    12-23- Patient reports taking Ibuprofen 200 mg at home without problems. Verified X3. [place]    Wasp sting [allergen ext-venom-honey bee] Anaphylaxis    Adhesive Blisters    Iodine and iodide containing products Hives     Allergic to iodine in seafood only    Shellfish containing products     Nuts [tree nut] Rash     Current Facility-Administered Medications   Medication Frequency    0.9%  NaCl infusion (for blood administration) Q24H PRN    0.9%  NaCl infusion (for blood administration) Q24H PRN    0.9%  NaCl infusion (for blood administration) Q24H PRN    acetaminophen tablet 1,000 mg Q12H    bisacodyL suppository 10 mg Every other day    buPROPion TB24 tablet 150 mg Daily    dextrose 10% bolus 125 mL 125 mL PRN    dextrose 10% bolus 250 mL 250 mL PRN    dronabinoL capsule 2.5 mg BID    DULoxetine DR capsule 30 mg BID    furosemide injection 80 mg Q12H    " gabapentin capsule 900 mg TID    glucagon (human recombinant) injection 1 mg PRN    glucose chewable tablet 16 g PRN    glucose chewable tablet 24 g PRN    heparin (porcine) injection 5,000 Units Q8H    hydrALAZINE tablet 25 mg Q8H PRN    HYDROmorphone injection 0.5 mg Q4H PRN    hydrOXYzine HCL tablet 25 mg TID PRN    lactulose 20 gram/30 mL solution Soln 20 g TID    magnesium hydroxide 400 mg/5 ml suspension 2,400 mg Daily PRN    melatonin tablet 6 mg Nightly PRN    meropenem (MERREM) 2 g in sodium chloride 0.9% 100 mL IVPB Q8H    methocarbamoL tablet 1,000 mg Q6H    naloxone 0.4 mg/mL injection 0.02 mg PRN    ondansetron injection 4 mg Q6H PRN    oxyCODONE immediate release tablet 5 mg Q3H PRN    And    oxyCODONE immediate release tablet Tab 10 mg Q3H PRN    pantoprazole EC tablet 40 mg Daily    polyethylene glycol packet 17 g Daily PRN    prochlorperazine injection Soln 2.5 mg Q6H PRN    senna-docusate 8.6-50 mg per tablet 1 tablet BID PRN    simethicone chewable tablet 80 mg TID PRN    sodium chloride 0.9% flush 10 mL Q12H PRN    sodium chloride 0.9% flush 10 mL Q6H    And    sodium chloride 0.9% flush 10 mL PRN    spironolactone tablet 25 mg Daily       Objective:     Vital Signs (Most Recent):  Temp: 97 °F (36.1 °C) (02/14/23 0812)  Pulse: 103 (02/14/23 0812)  Resp: 18 (02/14/23 0812)  BP: (!) 105/53 (02/14/23 0812)  SpO2: 100 % (02/14/23 0812)   Vital Signs (24h Range):  Temp:  [97 °F (36.1 °C)-98.4 °F (36.9 °C)] 97 °F (36.1 °C)  Pulse:  [101-118] 103  Resp:  [14-20] 18  SpO2:  [96 %-100 %] 100 %  BP: ()/(53-57) 105/53     Weight: (!) 140.7 kg (310 lb 3 oz) (02/14/23 0600)  Body mass index is 48.58 kg/m².  Body surface area is 2.58 meters squared.    I/O last 3 completed shifts:  In: 760 [P.O.:760]  Out: 3377 [Urine:2250; Drains:1125; Stool:2]    Physical Exam  Constitutional:       General: She is not in acute distress.     Appearance: Normal appearance. She is obese. She is  not ill-appearing.   HENT:      Head: Normocephalic and atraumatic.   Eyes:      General: No scleral icterus.  Cardiovascular:      Rate and Rhythm: Normal rate.      Pulses: Normal pulses.   Pulmonary:      Effort: Pulmonary effort is normal.   Abdominal:      General: There is distension.      Tenderness: There is abdominal tenderness. There is no guarding.   Musculoskeletal:      Right lower leg: Edema present.      Left lower leg: Edema present.   Skin:     General: Skin is warm and dry.      Coloration: Skin is not jaundiced.   Neurological:      Mental Status: She is alert and oriented to person, place, and time.   Psychiatric:         Behavior: Behavior normal.       Significant Labs:  All labs within the past 24 hours have been reviewed.     Significant Imaging:  Labs: Reviewed    Assessment/Plan:     Hyponatremia  42 y.o F w/ hx of IV drug use (meth), chronic HCV with cirrhosis, spinal fusion (04/2021), epidural abscess with removal of hardware (02/2022), spinal fusion with hardware (T10 - Pelvis / 03/2022), and neurogenic bladder here with complicated ESBL E.coli epidural abscess s/p washout,corpectomy, fixation being treated w/ meropenem.     Nephrology consulted for hyponatremia. Patient reports uncomfortable abdominal distension and leg swelling. Reports drinking 3-4 cups of her 30oz water tumbler daily. Urine Na <10, urine osm 513. Serum osm 283. Serum Na stable. Normal mentation.    Hyponatremia likely 2/2 to low effective circulating volume 2/2 cirrhosis. Possible poor solute intake relative to free water intake. Unlikely SIADH given low urine Na.     - Fluid restrict to 1 L daily to avoid worsening of hyponatremia. Patient currently not compliant w/ fluid restriction despite counseling.   - Lasix IV 80 BID, continue spironolactone 25mg daily  - repeat urine osm and sodium  - Asymptomatic, no role for hypertonic saline  - q12h Na check      Chronic hepatitis C with cirrhosis  Will need to f/u with  outpatient hepatology for antiviral treatment    Cirrhosis of liver with ascites  S/p para on 2/10  On lactulose, lasix and aldactone  Rest of management per primary        Thank you for your consult. I will follow-up with patient. Please contact us if you have any additional questions.    Spencer Khan MD  Nephrology  [facility] - Telemetry Stepdown

## 2023-02-14 NOTE — PROGRESS NOTES
"[facility] - Telemetry Stepdown  Adult Nutrition  Progress Note    SUMMARY       Recommendations    1. Continue current Regular diet + ONS (fluid restriction per MD).  2. RD to monitor & follow-up.    Goals: Continue meeting % EEN/EPN by next RD f/u.  Nutrition Goal Status: goal met  Communication of RD Recs:  (POC)    Assessment and Plan    No nutritional dx at this time.    Reason for Assessment    Reason For Assessment: RD follow-up  Diagnosis:  (Weakness of BLEs)  Relevant Medical History: Cirrhosis of liver with ascites, hep C, substance use disorder (IV drug), spinal fusion (4/21)  Interdisciplinary Rounds: did not attend    General Information Comments: Pt continues w/ 75% PO intake, tolerating diet + ONS. Paracentesis complete yesterday; 12 days post-op laminectomy. At initial RD assessment, pt reported good appetite PTA & UBW of 280# (weight gain 2/2 fluid - +3 edema). Appears nourished w/ no indicators of malnutrition.   Nutrition Discharge Planning: Regular diet    Nutrition/Diet History    Food Preferences: Less chicken and more salads, vanilla Boost only  Spiritual, Cultural Beliefs, Baptism Practices, Values that Affect Care: no  Food Allergies: shellfish, other (see comments) (Nuts)  Factors Affecting Nutritional Intake: None identified at this time    Anthropometrics    Temp: 96.5 °F (35.8 °C)  Height Method: Stated  Height: 5' 7" (170.2 cm)  Height (inches): 67 in  Weight Method: Bed Scale  Weight: (!) 140.7 kg (310 lb 3 oz)  Weight (lb): (!) 310.19 lb  Ideal Body Weight (IBW), Female: 135 lb  % Ideal Body Weight, Female (lb): 229.77 %  BMI (Calculated): 48.6  BMI Grade: greater than 40 - morbid obesity    Lab/Procedures/Meds    Pertinent Labs Reviewed: reviewed  Pertinent Labs Comments: Na 130  Pertinent Medications Reviewed: reviewed  Pertinent Medications Comments: Lactulose, Dronabinol    Estimated/Assessed Needs    Weight Used For Calorie Calculations: (!) 140.7 kg (310 lb 3 oz)    Energy " Calorie Requirements (kcal): 2100 kcal/d  Energy Need Method: [place] (1.0 PAL)    Protein Requirements: 113 g/d (.8 g/kg)  Weight Used For Protein Calculations: (!) 140.7 kg (310 lb 3 oz)    Estimated Fluid Requirement Method: RDA Method  RDA Method (mL): 2100    Nutrition Prescription Ordered    Current Diet Order: Regular  Nutrition Order Comments: 1000 mL FR  Oral Nutrition Supplement: Boost Plus TID    Evaluation of Received Nutrient/Fluid Intake    I/O: -9.7L since 1/31    Comments: LBM: 2/14    Tolerance: tolerating    Nutrition Risk    Level of Risk/Frequency of Follow-up:  (1 time/week)     Monitor and Evaluation    Food and Nutrient Intake: energy intake, food and beverage intake  Food and Nutrient Adminstration: diet order  Knowledge/Beliefs/Attitudes: food and nutrition knowledge/skill, beliefs and attitudes  Physical Activity and Function: nutrition-related ADLs and IADLs  Anthropometric Measurements: weight, height/length, weight change, body mass index  Biochemical Data, Medical Tests and Procedures: gastrointestinal profile, electrolyte and renal panel, glucose/endocrine profile, lipid profile, inflammatory profile  Nutrition-Focused Physical Findings: overall appearance     Nutrition Follow-Up    RD Follow-up?: Yes

## 2023-02-14 NOTE — PROGRESS NOTES
[facility] - Telemetry Stepdown  Neurosurgery  Progress Note    Subjective:     History of Present Illness: Ms. Zazueta is a 42 y.o F w/ hx ofIV drug use (methamphetamine), chronic HCV with cirrhosis, spinal fusion (04/2021), epidural abscess with removal of hardware (02/2022), spinal fusion with hardware (T10 - Pelvis / 03/2022), obesity (BMI 39.3) and neurogenic bladder and recent shoulder surgery who initially presented to [facility] for evaluation of back pain and left leg weakness.  She reports initially noting the left leg weakness when she was about to go to the bathroom and was about to fall but was assisted by her boyfriend.  She was brought to the ED via EMS.  Urinalysis with UTI concerns and blood cultures at OSH grew ESBL E coli, and shoulder effusion concern for infection so she was treated with meropenem.  During hospitalization, she reports the weakness that started out in her left leg initially then spread upwards to her left thigh, left hip, then crossed over to the right hip and spread to her right leg.  Denies recent IV drug use. She reports her last incidence of drug use was more than 3 years ago and also denies tobacco, and alcohol abuse.  Reports cannabis use.     OSH course notable for concerning urinalysis and blood cultures positive for ESBL E coli treated with meropenem.  She was also admit to the ICU where she was treated with Precedex for significant body aches, irritability, and muscle spasms.  Concerns of a murmur on physical exam so she underwent TTE which did not show any vegetations.  Worsening lower extremity weakness workup with MRI head nonspecific, MRI cervical spine with multilevel spondylosis, X-ray spine with multilevel thoracolumbar fusion and diskectomy, focal kyphosis at T9-10 increased compared to prior.  She was started on prophylaxis amoxicillin due to her history of infected hardware.  MRI spine unable to be completed due to patient's body habitus so she was  transferred to [facility] [place] [facility] for further imaging and Neurosurgery, Neurology evaluation.      Post-Op Info:  Procedure(s) (LRB):  LAMINECTOMY, SPINE, THORACIC, WITH FUSION (T4-Pelvis fusion w/ T9-10 corpectomies) **AIRO (N/A)   12 Days Post-Op     Interval History: NAEON. Neuro exam stable, ongoing severe edema of BLE's limiting movement. S/p paracentesis yesterday with 4700 ml removed. Drain output 540 cc total past 24 hrs. Hgb decreased to 6.9, transfusing 1u RBC today. BLE U/S negative for DVT but with non-specific fluid collection adjacent to L iliac vein. CT A/P w/ contrast for further evaluation. Regarding iodine allergy, pt reports only itching with shellfish consumption.     Medications:  Continuous Infusions:  Scheduled Meds:   acetaminophen  1,000 mg Oral Q12H    bisacodyL  10 mg Rectal Every other day    buPROPion  150 mg Oral Daily    dronabinoL  2.5 mg Oral BID    DULoxetine  30 mg Oral BID    furosemide (LASIX) injection  80 mg Intravenous Q12H    gabapentin  900 mg Oral TID    heparin (porcine)  5,000 Units Subcutaneous Q8H    lactulose  20 g Oral TID    meropenem (MERREM) IVPB  2 g Intravenous Q8H    methocarbamoL  1,000 mg Oral Q6H    pantoprazole  40 mg Oral Daily    sodium chloride 0.9%  10 mL Intravenous Q6H    spironolactone  25 mg Oral Daily     PRN Meds:sodium chloride, sodium chloride, sodium chloride, dextrose 10%, dextrose 10%, diphenhydrAMINE, glucagon (human recombinant), glucose, glucose, hydrALAZINE, HYDROmorphone, hydrOXYzine HCL, magnesium hydroxide 400 mg/5 ml, melatonin, naloxone, ondansetron, oxyCODONE **AND** oxyCODONE, polyethylene glycol, predniSONE, prochlorperazine, senna-docusate 8.6-50 mg, simethicone, sodium chloride 0.9%, Flushing PICC Protocol **AND** sodium chloride 0.9% **AND** sodium chloride 0.9%     Review of Systems  Objective:     Weight: (!) 140.7 kg (310 lb 3 oz)  Body mass index is 48.58 kg/m².  Vital Signs (Most Recent):  Temp: 96.5 °F (35.8 °C) (02/14/23  1117)  Pulse: 101 (02/14/23 1117)  Resp: 13 (02/14/23 1117)  BP: 117/62 (02/14/23 1117)  SpO2: 99 % (02/14/23 1117) Vital Signs (24h Range):  Temp:  [96.5 °F (35.8 °C)-98.4 °F (36.9 °C)] 96.5 °F (35.8 °C)  Pulse:  [101-112] 101  Resp:  [13-20] 13  SpO2:  [96 %-100 %] 99 %  BP: ()/(53-62) 117/62                          Closed/Suction Drain 02/02/23 1401 Right Back Bulb 15 Fr. (Active)   Site Description Healing 02/13/23 1958   Dressing Type Other (Comment) 02/10/23 2000   Dressing Status Clean;Dry;Intact 02/13/23 1958   Dressing Intervention Integrity maintained 02/13/23 1958   Drainage Serosanguineous 02/13/23 1958   Status Open to gravity drainage 02/13/23 1958   Output (mL) 5 mL 02/14/23 0614            Closed/Suction Drain 02/02/23 1402 Right Back Bulb 15 Fr. (Active)   Site Description Healing 02/13/23 1958   Dressing Type Other (Comment) 02/13/23 1958   Dressing Status Intact 02/13/23 1958   Dressing Intervention Integrity maintained 02/13/23 1958   Drainage Serosanguineous 02/13/23 1958   Status Open to gravity drainage 02/13/23 1958   Output (mL) 5 mL 02/14/23 0614            Closed/Suction Drain 02/02/23 1402 Left Back Bulb 15 Fr. (Active)   Site Description Edema/swelling 02/13/23 1958   Dressing Type Other (Comment) 02/13/23 1958   Dressing Status Clean;Dry;Intact 02/13/23 1958   Dressing Intervention Integrity maintained 02/13/23 1958   Drainage Serosanguineous 02/13/23 1958   Status Open to gravity drainage 02/13/23 1958   Output (mL) 35 mL 02/14/23 0614            Closed/Suction Drain 02/02/23 1402 Left Back Bulb 15 Fr. (Active)   Site Description Other (Comment) 02/13/23 1958   Dressing Type Other (Comment) 02/13/23 1958   Dressing Status Intact 02/13/23 1958   Dressing Intervention Integrity maintained 02/13/23 1958   Drainage Serosanguineous 02/13/23 1958   Status Open to gravity drainage 02/13/23 1958   Output (mL) 15 mL 02/14/23 0614       Female External Urinary Catheter 02/10/23 5791  (Active)   Skin no breakdown;no redness 02/13/23 1958   Tolerance no signs/symptoms of discomfort 02/13/23 1958   Suction Continuous suction at 70 mmHg 02/13/23 1958   Date of last wick change 02/13/23 02/13/23 1958   Time of last wick change 0600 02/13/23 1028   Output (mL) 500 mL 02/13/23 2246       Physical Exam    Neurosurgery Physical Exam    General: well developed, well nourished, no distress.   Head: normocephalic, atraumatic  Neurologic: Alert and oriented. Thought content appropriate.  GCS: Motor: 6/Verbal: 5/Eyes: 4 GCS Total: 15  Mental Status: Awake, Alert, Oriented x 4  Language: No aphasia  Speech: No dysarthria  Cranial nerves: face symmetric, tongue midline, CN II-XII grossly intact.   Eyes: pupils equal, round, reactive to light with accommodation, EOMI. Dysconjugate gaze.  Pulmonary: normal respirations, no signs of respiratory distress  Skin: Skin is warm, dry and intact.  Sensory: intact to light touch throughout  Motor Strength: Moves all extremities spontaneously.     Strength   Deltoids Triceps Biceps Wrist Extension Wrist Flexion Hand    Upper: R 5/5 5/5 5/5 5/5 5/5 5/5     L 5/5 5/5 5/5 5/5 5/5 5/5       Iliopsoas Quadriceps Knee  Flexion Tibialis  anterior Gastro- cnemius EHL   Lower: R 2/5 2/5 2/5 2/5 2/5 2/5     L 2/5 2/5 2/5 3/5 3/5 3/5      BLE strength/ROM limited due to pain, body habitus and severe bilateral leg edema.     Thoracolumbar incision: C/D/I with staples. Skin edges well approximated. No surrounding erythema or edema. No drainage or TTP.      MIHIR drains x 4 intact to gravity with ss output.    Significant Labs:  Recent Labs   Lab 02/13/23  0500 02/13/23 2055 02/14/23  0340   *  119* 123* 93   *  128* 127* 130*   K 4.0  4.0 4.2 3.9   CL 97  97 97 102   CO2 23  26 26 25   BUN 29*  29* 33* 31*   CREATININE 0.6  0.6 0.6 0.5   CALCIUM 7.4*  7.6* 7.5* 6.7*   MG 1.9  --  1.8     Recent Labs   Lab 02/13/23  0500 02/14/23  0340   WBC 4.85 4.14   HGB 7.8*  6.9*   HCT 24.9* 22.7*   * 103*     No results for input(s): LABPT, INR, APTT in the last 48 hours.  Microbiology Results (last 7 days)       Procedure Component Value Units Date/Time    Aerobic culture [[id number]] Collected: 02/10/23 1430    Order Status: Completed Specimen: Ascites Updated: 02/14/23 1036     Aerobic Bacterial Culture No growth    Fungus culture [[id number]] Collected: 02/13/23 1529    Order Status: Completed Specimen: Ascites Updated: 02/14/23 0936     Fungus (Mycology) Culture Culture in progress    Aerobic culture [[id number]] Collected: 02/13/23 1529    Order Status: Completed Specimen: Ascites Updated: 02/14/23 0902     Aerobic Bacterial Culture No growth    Culture, Anaerobic [[id number]] Collected: 02/10/23 1430    Order Status: Completed Specimen: Ascites Updated: 02/14/23 0844     Anaerobic Culture Culture in progress    Culture, Anaerobic [[id number]] Collected: 02/13/23 1529    Order Status: Completed Specimen: Ascites Updated: 02/14/23 0655     Anaerobic Culture Culture in progress    Gram stain [[id number]] Collected: 02/13/23 1529    Order Status: Completed Specimen: Ascites Updated: 02/13/23 1814     Gram Stain Result Rare WBC's      No organisms seen    AFB Culture & Smear [[id number]] Collected: 02/13/23 1529    Order Status: Sent Specimen: Ascites Updated: 02/13/23 1619    AFB Culture & Smear [[id number]]     Order Status: Completed Specimen: Ascites     Gram stain [[id number]] Collected: 02/10/23 1430    Order Status: Completed Specimen: Ascites Updated: 02/11/23 0333     Gram Stain Result No WBC's      No organisms seen    Fungus culture [[id number]] Collected: 01/24/23 0913    Order Status: Completed Specimen: Body Fluid from Back Updated: 02/08/23 0655     Fungus (Mycology) Culture Culture in progress      No fungus isolated after 2 weeks    Narrative:      1) Perispinal    Fungus culture [[id number]] Collected: 01/24/23 0943    Order Status: Completed Specimen: Bone from Back  Updated: 02/08/23 0655     Fungus (Mycology) Culture Culture in progress      No fungus isolated after 2 weeks    Narrative:      3) Perispinal    Fungus culture [[id number]] Collected: 01/24/23 0943    Order Status: Completed Specimen: Bone from Back Updated: 02/08/23 0655     Fungus (Mycology) Culture Culture in progress      No fungus isolated after 2 weeks    Narrative:      4) Perispinal    Fungus culture [[id number]] Collected: 01/24/23 0913    Order Status: Completed Specimen: Body Fluid from Back Updated: 02/08/23 0655     Fungus (Mycology) Culture Culture in progress      No fungus isolated after 2 weeks    Narrative:      2) Perispinal          All pertinent labs from the last 24 hours have been reviewed.    Significant Diagnostics:  I have reviewed and interpreted all pertinent imaging results/findings within the past 24 hours.    Assessment/Plan:     * Weakness of both lower extremities  Pt is 42F multiple spinal surgeries and revisions last T10- Pelvis in March 2022 who presented to OSH with concern for shoulder infection and UTI found to have E coli sepsis who has had progressive worsening BLE weakness. 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 Transferred to [facility] to  and neurosurgery was consulted.    OR 1/24 for temporary T6-12 PSIF. Taz pus noted intraoperatively.   OR 2/2 for T4-pelvis revision and extension of fusion with T9-10 corpectomy with plastic surgery assistance.  Maps goals > 85 completed 2/5 in ICU.    Plan:  --Stepped down to  floor.   -q4h neurochecks.  --All labs & diagnostics reviewed.   -Post op xrays pending. Ordered, ok to obtain supine today. Will try to obtain upright prior to discharge.  --TLSO to be worn when OOB only.  --MIHIR drains x4 to gravity, monitor output qshift. Per plastics - keep all drains until discharge -- management of drains per plastics. Following peripherally.  --Plastic surgery managing incision. Open to air.   - Off-load pressure  as much as possible to promote wound healing.   - Elevate with wedge, alternate sides Q2H  --Thoracic discitis: BCx 1/20 no growth. OR cultures 1/24 grew ESBL E. coli   -- ID consulted. Recs for IV meropenem with estimated end date of 3/29/23.  --Pain Control: Continue multimodal regimen. PCA pump discontinued 2/8 with poor pain control subsequently.    - Anesthesia Pain Service consulted due to refractory pain, appreciate assistance. Continue current regimen.    - No NSAIDs as pt s/p recent instrumented fusion.  --Acute blood loss anemia: Goal Hbg > 7. Transfuse PRN.  --DVT PPx: SQH/TEDs/SCDs.  --US BLE 2/13 negative for DVT but showed nonspecific fluid collection adjacent to L iliac vein.   - CT A/P w/ contrast for further evaluation   --NSGY will continue to follow. Please contact team with any further questions.        Shabnam Alemndarez PA-C  Neurosurgery  [facility] - Telemetry Stepdown

## 2023-02-14 NOTE — ASSESSMENT & PLAN NOTE
42 y.o F w/ hx of IV drug use (meth), chronic HCV with cirrhosis, spinal fusion (04/2021), epidural abscess with removal of hardware (02/2022), spinal fusion with hardware (T10 - Pelvis / 03/2022), and neurogenic bladder here with complicated ESBL E.coli epidural abscess s/p washout,corpectomy, fixation being treated w/ meropenem.     Nephrology consulted for hyponatremia. Patient reports uncomfortable abdominal distension and leg swelling. Reports drinking 3-4 cups of her 30oz water tumbler daily. Urine Na <10, urine osm 513. Serum osm 283. Serum Na stable. Normal mentation.    Hyponatremia likely 2/2 to low effective circulating volume 2/2 cirrhosis. Possible poor solute intake relative to free water intake. Unlikely SIADH given low urine Na.     - Fluid restrict to 1 L daily to avoid worsening of hyponatremia. Patient currently not compliant w/ fluid restriction despite counseling.   - Lasix IV 80 BID, continue spironolactone 25mg daily  - repeat urine osm and sodium  - Asymptomatic, no role for hypertonic saline  - q12h Na check

## 2023-02-14 NOTE — SUBJECTIVE & OBJECTIVE
Interval History: NAEON. Neuro exam stable, ongoing severe edema of BLE's limiting movement. S/p paracentesis yesterday with 4700 ml removed. Drain output 540 cc total past 24 hrs. Hgb decreased to 6.9, transfusing 1u RBC today. BLE U/S negative for DVT but with non-specific fluid collection adjacent to L iliac vein. CT A/P w/ contrast for further evaluation. Regarding iodine allergy, pt reports only itching with shellfish consumption.     Medications:  Continuous Infusions:  Scheduled Meds:   acetaminophen  1,000 mg Oral Q12H    bisacodyL  10 mg Rectal Every other day    buPROPion  150 mg Oral Daily    dronabinoL  2.5 mg Oral BID    DULoxetine  30 mg Oral BID    furosemide (LASIX) injection  80 mg Intravenous Q12H    gabapentin  900 mg Oral TID    heparin (porcine)  5,000 Units Subcutaneous Q8H    lactulose  20 g Oral TID    meropenem (MERREM) IVPB  2 g Intravenous Q8H    methocarbamoL  1,000 mg Oral Q6H    pantoprazole  40 mg Oral Daily    sodium chloride 0.9%  10 mL Intravenous Q6H    spironolactone  25 mg Oral Daily     PRN Meds:sodium chloride, sodium chloride, sodium chloride, dextrose 10%, dextrose 10%, diphenhydrAMINE, glucagon (human recombinant), glucose, glucose, hydrALAZINE, HYDROmorphone, hydrOXYzine HCL, magnesium hydroxide 400 mg/5 ml, melatonin, naloxone, ondansetron, oxyCODONE **AND** oxyCODONE, polyethylene glycol, predniSONE, prochlorperazine, senna-docusate 8.6-50 mg, simethicone, sodium chloride 0.9%, Flushing PICC Protocol **AND** sodium chloride 0.9% **AND** sodium chloride 0.9%     Review of Systems  Objective:     Weight: (!) 140.7 kg (310 lb 3 oz)  Body mass index is 48.58 kg/m².  Vital Signs (Most Recent):  Temp: 96.5 °F (35.8 °C) (02/14/23 1117)  Pulse: 101 (02/14/23 1117)  Resp: 13 (02/14/23 1117)  BP: 117/62 (02/14/23 1117)  SpO2: 99 % (02/14/23 1117) Vital Signs (24h Range):  Temp:  [96.5 °F (35.8 °C)-98.4 °F (36.9 °C)] 96.5 °F (35.8 °C)  Pulse:  [101-112] 101  Resp:  [13-20] 13  SpO2:   [96 %-100 %] 99 %  BP: ()/(53-62) 117/62                          Closed/Suction Drain 02/02/23 1401 Right Back Bulb 15 Fr. (Active)   Site Description Healing 02/13/23 1958   Dressing Type Other (Comment) 02/10/23 2000   Dressing Status Clean;Dry;Intact 02/13/23 1958   Dressing Intervention Integrity maintained 02/13/23 1958   Drainage Serosanguineous 02/13/23 1958   Status Open to gravity drainage 02/13/23 1958   Output (mL) 5 mL 02/14/23 0614            Closed/Suction Drain 02/02/23 1402 Right Back Bulb 15 Fr. (Active)   Site Description Healing 02/13/23 1958   Dressing Type Other (Comment) 02/13/23 1958   Dressing Status Intact 02/13/23 1958   Dressing Intervention Integrity maintained 02/13/23 1958   Drainage Serosanguineous 02/13/23 1958   Status Open to gravity drainage 02/13/23 1958   Output (mL) 5 mL 02/14/23 0614            Closed/Suction Drain 02/02/23 1402 Left Back Bulb 15 Fr. (Active)   Site Description Edema/swelling 02/13/23 1958   Dressing Type Other (Comment) 02/13/23 1958   Dressing Status Clean;Dry;Intact 02/13/23 1958   Dressing Intervention Integrity maintained 02/13/23 1958   Drainage Serosanguineous 02/13/23 1958   Status Open to gravity drainage 02/13/23 1958   Output (mL) 35 mL 02/14/23 0614            Closed/Suction Drain 02/02/23 1402 Left Back Bulb 15 Fr. (Active)   Site Description Other (Comment) 02/13/23 1958   Dressing Type Other (Comment) 02/13/23 1958   Dressing Status Intact 02/13/23 1958   Dressing Intervention Integrity maintained 02/13/23 1958   Drainage Serosanguineous 02/13/23 1958   Status Open to gravity drainage 02/13/23 1958   Output (mL) 15 mL 02/14/23 0614       Female External Urinary Catheter 02/10/23 2323 (Active)   Skin no breakdown;no redness 02/13/23 1958   Tolerance no signs/symptoms of discomfort 02/13/23 1958   Suction Continuous suction at 70 mmHg 02/13/23 1958   Date of last wick change 02/13/23 02/13/23 1958   Time of last wick change 0600 02/13/23  1028   Output (mL) 500 mL 02/13/23 2246       Physical Exam    Neurosurgery Physical Exam    General: well developed, well nourished, no distress.   Head: normocephalic, atraumatic  Neurologic: Alert and oriented. Thought content appropriate.  GCS: Motor: 6/Verbal: 5/Eyes: 4 GCS Total: 15  Mental Status: Awake, Alert, Oriented x 4  Language: No aphasia  Speech: No dysarthria  Cranial nerves: face symmetric, tongue midline, CN II-XII grossly intact.   Eyes: pupils equal, round, reactive to light with accommodation, EOMI. Dysconjugate gaze.  Pulmonary: normal respirations, no signs of respiratory distress  Skin: Skin is warm, dry and intact.  Sensory: intact to light touch throughout  Motor Strength: Moves all extremities spontaneously.     Strength   Deltoids Triceps Biceps Wrist Extension Wrist Flexion Hand    Upper: R 5/5 5/5 5/5 5/5 5/5 5/5     L 5/5 5/5 5/5 5/5 5/5 5/5       Iliopsoas Quadriceps Knee  Flexion Tibialis  anterior Gastro- cnemius EHL   Lower: R 2/5 2/5 2/5 2/5 2/5 2/5     L 2/5 2/5 2/5 3/5 3/5 3/5      BLE strength/ROM limited due to pain, body habitus and severe bilateral leg edema.     Thoracolumbar incision: C/D/I with staples. Skin edges well approximated. No surrounding erythema or edema. No drainage or TTP.      MIHIR drains x 4 intact to gravity with ss output.    Significant Labs:  Recent Labs   Lab 02/13/23  0500 02/13/23 2055 02/14/23  0340   *  119* 123* 93   *  128* 127* 130*   K 4.0  4.0 4.2 3.9   CL 97  97 97 102   CO2 23  26 26 25   BUN 29*  29* 33* 31*   CREATININE 0.6  0.6 0.6 0.5   CALCIUM 7.4*  7.6* 7.5* 6.7*   MG 1.9  --  1.8     Recent Labs   Lab 02/13/23  0500 02/14/23  0340   WBC 4.85 4.14   HGB 7.8* 6.9*   HCT 24.9* 22.7*   * 103*     No results for input(s): LABPT, INR, APTT in the last 48 hours.  Microbiology Results (last 7 days)       Procedure Component Value Units Date/Time    Aerobic culture [[id number]] Collected: 02/10/23 1430    Order  Status: Completed Specimen: Ascites Updated: 02/14/23 1036     Aerobic Bacterial Culture No growth    Fungus culture [[id number]] Collected: 02/13/23 1529    Order Status: Completed Specimen: Ascites Updated: 02/14/23 0936     Fungus (Mycology) Culture Culture in progress    Aerobic culture [[id number]] Collected: 02/13/23 1529    Order Status: Completed Specimen: Ascites Updated: 02/14/23 0902     Aerobic Bacterial Culture No growth    Culture, Anaerobic [[id number]] Collected: 02/10/23 1430    Order Status: Completed Specimen: Ascites Updated: 02/14/23 0844     Anaerobic Culture Culture in progress    Culture, Anaerobic [[id number]] Collected: 02/13/23 1529    Order Status: Completed Specimen: Ascites Updated: 02/14/23 0655     Anaerobic Culture Culture in progress    Gram stain [[id number]] Collected: 02/13/23 1529    Order Status: Completed Specimen: Ascites Updated: 02/13/23 1814     Gram Stain Result Rare WBC's      No organisms seen    AFB Culture & Smear [[id number]] Collected: 02/13/23 1529    Order Status: Sent Specimen: Ascites Updated: 02/13/23 1619    AFB Culture & Smear [[id number]]     Order Status: Completed Specimen: Ascites     Gram stain [[id number]] Collected: 02/10/23 1430    Order Status: Completed Specimen: Ascites Updated: 02/11/23 0333     Gram Stain Result No WBC's      No organisms seen    Fungus culture [[id number]] Collected: 01/24/23 0913    Order Status: Completed Specimen: Body Fluid from Back Updated: 02/08/23 0655     Fungus (Mycology) Culture Culture in progress      No fungus isolated after 2 weeks    Narrative:      1) Perispinal    Fungus culture [[id number]] Collected: 01/24/23 0943    Order Status: Completed Specimen: Bone from Back Updated: 02/08/23 0655     Fungus (Mycology) Culture Culture in progress      No fungus isolated after 2 weeks    Narrative:      3) Perispinal    Fungus culture [[id number]] Collected: 01/24/23 0943    Order Status: Completed Specimen: Bone from Back  Updated: 02/08/23 0655     Fungus (Mycology) Culture Culture in progress      No fungus isolated after 2 weeks    Narrative:      4) Perispinal    Fungus culture [[id number]] Collected: 01/24/23 0913    Order Status: Completed Specimen: Body Fluid from Back Updated: 02/08/23 0655     Fungus (Mycology) Culture Culture in progress      No fungus isolated after 2 weeks    Narrative:      2) Perispinal          All pertinent labs from the last 24 hours have been reviewed.    Significant Diagnostics:  I have reviewed and interpreted all pertinent imaging results/findings within the past 24 hours.

## 2023-02-14 NOTE — OP NOTE
Stage 2 of 2 planned stages  DATE OF SURGERY: 2/2/23     PREOPERATIVE DIAGNOSIS:  1. 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 Stage 1 wound washout and temporary fixation T7-T12  3. Polysubstance abuse, Class III obesity     POSTOPERATIVE DIAGNOSIS:  Same.     PROCEDURE PERFORMED:  1. Wound washout and excisional debridement T4 to pelvis  2. Removal and reinsertion of spinal hardware, T7-T12  3. Revision posterior spinal fusion, T4 to pelvis   4. Posterior segmental spinal fixation, T4 to pelvis ([employer])  5. Pelvic fixation with bilateral S2AI screws and a right sided iliac bolt ([employer])  6. T9 and T10 corpectomy from a right LATERAL EXTRACAVITARY approach  7. Anterior interbody fusion T8 to T11  8. Placement of expandable titanium corpectomy cage, T9 and T10 ([employer])  9.  Use of intraoperative fluoroscopy  10.  Use of intraoperative neuro-monitoring with MEPs  11. Use of intraoperative CT neuronavigation  12. Infuse and cadaveric bone grafting     SURGEON: Guille Templeton M.D.     CO-SURGEONS:  Mario Alberto Camilo M.D. -- Dr. Camilo assisted with the placement of all spinal instrumentation because a qualified resident was not available for this portion of the procedure.  Maximino Lewis M.D. of Plastic Surgery for complex wound closure     ANESTHESIA: GETA     ESTIMATED BLOOD LOSS: 1000mL     COMPLICATIONS: None     DRAINS: See Plastic Surgery's note     SPECIMENS SENT: None     FINDINGS: None     INDICATIONS:     See my note from stage 1, which she tolerated well. Today's stage 2 is as listed above. R/B/A/I/M were reviewed in detail with the patient and she wished to proceed. All questions were answered and no guarantees were made.  .  PROCEDURE:     The patient was brought into the operating room where she was intubated and placed under general anesthesia without difficulty.  All lines were placed. She was repositioned prone onto the operating room table with  appropriate padding all pressure points. The region of interest from T4 to pelvis was localized with AP and lateral x-ray.  The skin was marked and the area was prepped and draped in the usual sterile fashion.  A timeout was performed prior to procedure.      We removed the prior staples and cut the subcutaneous sutures to open the recent incision from T4 to sacrum. We washed out the soft tissues above the fascia with a Pulsevac. We then cut the fascial sutures, exposing the posterior elements from T4 to the sacrum and the recent temporary hardware from T7 to T12. We removed the antibiotic beads placed last time and washed out the entire wound with Betadine, peroxide and Pulsevac lavage. We removed the set screws and rods from the temporary construct from T7 to T12, but kept the screws in place.  We placed upsized diameter screws into the previous lumbar tracts from L1 to S1, skipping left L4 due to inadequate purchase.    The CT neuro navigation array was docked onto a thoracic spinous process and a CT spin was acquired.  The intraoperative CT confirmed levels and good hardware position.  Using neuro-navigation pedicle screws were placed bilaterally from T4 to T7.  Next we placed the CT array onto the right PSIS through a separate incision and acquired a second spin.  Using navigation we placed bilateral with S2AI screws. Additionally we placed a right sided iliac bolt with navigation. Spinal xrays confirmed good hardware position. We augmented bilateral T5 screws with cement under flouroscopy, and we confirmed no significant cement extravasation.    Next we performed a T9 and T10 corpectomy from a right sided lateral extracavitary approach. We placed a temporary frankie on the left side. We removed the right sided T9 and T10 pedicles and rib heads with rongeurs. We sacrificed the right T9 and T10 nerve roots to gain access to our corpectomy site. We performed a T9 and T10 corpectomy with navigated osteotomes and  elioguers, removing greater than 50% of each level in the process. There was some pus seen in this space and we washed it out with Betadine. We sized the defect and placed a titanium expandable cage until snug fit. The kyphotic deformity also reduced during this maneuver. The cage was packed with synthetic bone for anterior arthrodesis from T8 to T11.  Xrays showed great cage position.    Bilateral titanium rods were sized, contoured and reduced into the tulip heads.  Set screws were tightened. A left sided accessory frankie spanning the corpectomy was placed and secured with side connectors to the primary frankie. A right sided kickstand frankie was placed from the right iliac bolt to the right primary frankie at the thoracolumbar junction using side connectors. Final xrays showed excellent reduction of the kyphotic deformity and excellent position of all hardware.  The wound was copiously irrigated with sterile normal saline and a dilute Betadine solution. Exposed bony surfaces from T4 to the sacrum were decorticated bilaterally with a high-speed drill.  Infuse, cancellous bone chips and synthetic putty were placed posteriorly for arthrodesis from T4 to sacrum. The wound was handed over to Plastic surgery for their complex wound revision and closure. Please see their note for details.      During my involvement the patient appeared to tolerate the procedure well from a hemodynamic and neuromonitoring standpoint. MEPs were present and stable in all extremities throughout the case. Dr. Camilo and I were present for all critical portions of the case, and at the end of the case all counts were correct. The patient was repositioned supine onto the hospital bed where she was extubated and allowed to emerge from anesthesia. She was sent to the ICU in stable condition for recovery.    JUSTIFICATION OF CO-SURGEONS and MODIFIER 22: This was a complex multistage spinal fusion surgery and corpectomy for complex infection and kyphotic  deformity in a high risk patient with Class III obesity, polysubstance abuse and medicel noncompliance. Two spine attending surgeons were indicated to reduce operative times, blood loss, and to improve patient outcomes. Plastic surgery was indicated for the complex wound closure. The inherent risks of the procedure were significantly elevated over similar procedures given her major comorbid conditions.

## 2023-02-14 NOTE — PLAN OF CARE
Pt alert and oriented. Able to make needs known. Receives Diluadid and Oxycodone prn c/o pain . MIHIR drain x 4 to gravity. 4+ edema noted to elizabeth lower ext. Total care, able to assist with turning upper body. Midline staples to back well approximated without any drainage, remains open to air. Multiple bm throughout the night. Bed in low position, call light within reach and side rails up.

## 2023-02-14 NOTE — PT/OT/SLP PROGRESS
Occupational Therapy      Patient Name:  Rachel Zazueta   MRN:  [id number]    Patient not seen today secondary to per nursing, pt to go [facility] for CT scan and then to receive blood.  Will follow-up as scheduled per OT POC.    2/14/2023

## 2023-02-14 NOTE — PROGRESS NOTES
[facility] - Telemetry [facility] Medicine  Progress Note    Patient Name: Rachel Zazueta  MRN: [id number]  Patient Class: IP- Inpatient   Admission Date: 1/19/2023  Length of Stay: 26 days  Attending Physician: Vincent Bal MD  Primary Care Provider: Dannielle Merino DO        Subjective:     Principal Problem:Weakness of both lower extremities        HPI:  Ms. Zazueta is a 42 y.o F w/ hx ofIV drug use (methamphetamine), chronic HCV with cirrhosis, spinal fusion (04/2021), epidural abscess with removal of hardware (02/2022), spinal fusion with hardware (T10 - Pelvis / 03/2022), obesity (BMI 39.3) and neurogenic bladder who initially presented to [facility] for evaluation of back pain and left leg weakness.  She reports initially noting the left leg weakness when she was about to go to the bathroom and was about to fall but was assisted by her boyfriend.  She was brought to the ED via EMS.  Urinalysis with UTI concerns and blood cultures at OSH grew ESBL E coli so she was treated with meropenem.  During hospitalization, she reports the weakness that started out in her left leg initially then spread upwards to her left thigh, left hip, then crossed over to the right hip and spread to her right leg.  Denies recent IV drug use. She reports her last incidence of drug use was more than 3 years ago and also denies tobacco, and alcohol abuse.  Reports cannabis use.    She also reports more than 10 years ago she had an episode of a headache that lasted about 4 days and was associated with residual defects of a strabismus in the left eye with associated visual disturbances.  She also reports over the past few years her friends have noticed that she sometimes has word-finding difficulty during conversations.     Reports shortness of breath when sitting up, fever, chills, back pain, lower extremity weakness(initally L>R, but now R>L), diarrhea.  Denies cough, nausea, vomiting, constipation, bladder or  bowel incontinence, dysuria, dark urine, new vision changes.    OSH course notable for concerning urinalysis and blood cultures positive for ESBL E coli treated with meropenem.  She was also admit to the ICU where she was treated with Precedex for significant body aches, irritability, and muscle spasms.  Concerns of a murmur on physical exam so she underwent TTE which did not show any vegetations.  Worsening lower extremity weakness workup with MRI head nonspecific, MRI cervical spine with multilevel spondylosis, X-ray spine with multilevel thoracolumbar fusion and diskectomy, focal kyphosis at T9-10 increased compared to prior.  She was started on prophylaxis amoxicillin due to her history of infected hardware.  MRI spine unable to be completed due to patient's body habitus so she was transferred to [facility] for further imaging and Neurosurgery, Neurology evaluation.      Overview/Hospital Course:  Pt admitted as a transfer from [facility] for worsening LE weakness, concern for spinal infection, and need for MRI which could not be done at OSH. Imaging was completed here. Worsening proximal junctional kyphosis noted at T9-10 noted with concern for discitis at T8-9, T9-10. NSGY evaluated.     Found to have T8-T10 kyphosis on MRI. 1/24 underwent laminectomy T8-T10; posterior fusion T8-T12; and washout. 2/2 underwent T4-pelvis fusion and T9-T10 corpectomy.  ID consulted for thoracic discitis infection.  Patient to complete 8 weeks of therapy with meropenem. Patient underwent flap closure with Plastic surgery.  Step-down Hospital Medicine on 02/08.    2/9   On meropenem for MDR-ESCHERICHIA COLI ESBL  sodium trended down to 129.  Neurosurgery following post OR and aiding in pain management. PCA discontinued 2/8 . On oxycodone 10mg PO q4h . requiring IV dilaudid q4 as needed,. drains to gravity output 990ml/24h . Plastic surgery following flap, managing wounds.  Wound VAC discontinued on Wednesday.  accepted to [facility] pain  management consulted for back ache- switched to multimodal therapy. sodium trended down to 127.   2/10 sodium stable at 127 . continues with increased drain output 985ml/24h . negative balance of  2.8 L lasix on hold.  nephrology consulted. abdominal X ray -  Nonobstructive bowel gas pattern.  No free air.  As before, mild gaseous distension of stomach. ammonia at 57 . sono abdomen  . tylenol changed to 1000mg q12h with cirrhosis . sono abdomen -Cirrhotic liver morphology.  Sequela of portal hypertension including moderate ascites, recanalized umbilical vein.  No focal hepatic lesions. Cholecystectomy. . Nephrology recs  fluid restriction to 1 L,  lasix 60 mg IV daily,  and trend sodium q12h.  diazepam discontinued . s/p IR paracentesis today   2/11no evidence of SBP on paracentesis. 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 AFB and Triglyerides on ascitic fluid.  noncompliant with  fluid restriction to 1 L despite counseling. sodium trended down to 126 .received 2 doses of IV lasix 60mg. drain output 250ml. Increased Lasix IV 80 BID  due to UOP 500ml/24h   2/12 UOP of 1250ml/24h. K and P replaced. sodium trended up to 129.  hepatology eval -If ascitic fluid triglyceride level is elevated, needs IR  eval for lymphangiogram and surgery eval for  ymphatic repair. xanax 0.25mg BID PRN for anxiety   2/13 s/p paracentesis. Removed 4,700 ml. DVT sonogram LE negative. s/p psychiatry eval  for anxiety/depression. xanax discontinued. Decreased Bupropion to 150 mg PO Daily . Started Duloxetine 30 mg PO BID and  PRN Hydroxyzine 25 mg PO q8h for anxiety  2/14 Hb 6.9 Transfusion with 1 unit of PRBC . FOBT. sodium trended up to 130 . Diuresing well with lasix IV . DVT sonogram - Nonspecific fluid collection adjacent to the left iliac vein.  Recommend further evaluation with contrast enhanced CT scan . has Iodine contrast allergy- . 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   Cardiovascular: chest pain, dizziness, palpitations, orthopnea, swelling of feet, syncope  Gastrointestinal: nausea, vomiting, abdominal pain-improved  diarrhea, black stool,  blood in stool, change in bowel habits  Genitourinary: hematuria, dysuria, urgency, frequency  Integument/Breast: rash,  pruritis,  Surgical wound - back   Hematologic/Lymphatic: easy bruising, lymphadenopathy  Musculoskeletal: arthralgias , myalgias, back pain, neck pain, knee pain  Neurological: confusion, seizures, tremors, slurred speech  Behavioral/Psych:  depression, anxiety, auditory or visual hallucinations     OBJECTIVE:     Physical Exam:  Body mass index is 48.58 kg/m².    Constitutional: Appears well-developed and well-nourished. severe obesity  Head: Normocephalic and atraumatic.   Neck: Normal range of motion. Neck supple.   Cardiovascular: Normal heart rate.  Regular heart rhythm.  Pulmonary/Chest: Effort normal.   Abdominal:+  distension.  No tenderness  Musculoskeletal: Normal range of motion. ++ edema. drains in place   Neurological: Alert and oriented to person, place, and time. LE strength - 3-4/5   Skin: Skin is warm and dry.   Psychiatric: Normal mood and affect. Behavior is normal.                  Vital Signs  Temp: 97.8 °F (36.6 °C) (02/14/23 1615)  Pulse: 90 (02/14/23 1615)  Resp: 20 (02/14/23 1615)  BP: 120/72 (02/14/23 1615)  SpO2: 99 % (02/14/23 1615)     24 Hour VS Range    Temp:  [96.5 °F (35.8 °C)-98.4 °F (36.9 °C)]   Pulse:  []   Resp:  [13-20]   BP: ()/(53-72)   SpO2:  [98 %-100 %]   Arterial Line BP: (102)/(60)      Intake/Output Summary (Last 24 hours) at 2/14/2023 1839  Last data filed at 2/14/2023 0614  Gross per 24 hour   Intake 760 ml   Output 2790 ml   Net -2030 ml         I/O This Shift:  No intake/output data recorded.    Wt Readings from Last 3 Encounters:   02/14/23 (Abnormal) 140.7 kg (310 lb 3 oz)   01/19/23 110.6 kg (243 lb 12.8 oz)   01/19/23 110.6 kg (243 lb 13.3 oz)       I have personally reviewed the vitals and recorded Intake/Output     Laboratory/Diagnostic Data:    CBC/Anemia Labs: Coags:    Recent Labs   Lab 02/13/23  0500 02/14/23  0340 02/14/23  1508   WBC 4.85 4.14 4.32   HGB 7.8* 6.9* 7.9*   HCT 24.9* 22.7* 25.7*   * 103* 105*   MCV 92 95 94   RDW 19.6* 19.0* 18.7*    No results for input(s): PT, INR, APTT in the last 168 hours.       Chemistries: ABG:   Recent Labs   Lab 02/12/23  0528 02/12/23  0535 02/13/23  0500 02/13/23 2055 02/14/23  0340 02/14/23  1508   *  129*  --  127*  128* 127* 130* 128*   K 3.4*  3.5  --  4.0  4.0 4.2 3.9 4.3   CL 99  100  --  97  97 97 102 97   CO2 21*  21*  --  23  26 26 25 27   BUN 27*  27*  --  29*  29* 33* 31* 33*   CREATININE 0.5  0.5  --  0.6  0.6 0.6 0.5 0.5   CALCIUM 7.2*  7.1*  --  7.4*  7.6* 7.5* 6.7* 7.7*   PROT 4.9*  --  5.0*  --  4.3*  --    BILITOT 1.2*  --  1.2*  --  1.0  --    ALKPHOS 141*  --  148*  --  130  --    ALT 23  --  26  --  25  --    AST 38  --  40  --  36  --    MG 1.8  --  1.9  --  1.8  --    PHOS 2.6*   < > 2.9  3.0 2.9 2.8 3.4    < > = values in this interval not displayed.    No results for input(s): PH, PCO2, PO2, HCO3, POCSATURATED, BE in the last 168 hours.       POCT Glucose: HbA1c:    No results for input(s): POCTGLUCOSE in the last 168 hours. Hemoglobin A1C   Date Value Ref Range Status   04/16/2021 4.6 4.0 - 5.6 % Final     Comment:     ADA Screening Guidelines:  5.7-6.4%  Consistent with prediabetes  >or=6.5%  Consistent with diabetes    High levels of fetal hemoglobin interfere with the HbA1C  assay.  Heterozygous hemoglobin variants (HbS, HgC, etc)do  not significantly interfere with this assay.   However, presence of multiple variants may affect accuracy.     05/14/2019 4.2 4.0 - 5.6 % Final     Comment:     ADA Screening Guidelines:  5.7-6.4%  Consistent with prediabetes  >or=6.5%  Consistent with diabetes  High levels of fetal hemoglobin interfere with the HbA1C  assay. Heterozygous hemoglobin variants (HbS, HgC, etc)do  not significantly interfere with this assay.   However, presence of multiple variants may affect accuracy.     11/24/2018 4.2 4.0 - 5.6 % Final     Comment:     ADA Screening Guidelines:  5.7-6.4%  Consistent with prediabetes  >or=6.5%  Consistent with diabetes  High levels of fetal hemoglobin interfere with the HbA1C  assay. Heterozygous hemoglobin variants (HbS, HgC, etc)do  not significantly interfere with this assay.   However, presence of multiple variants may affect accuracy.          Cardiac Enzymes: Ejection Fractions:    No results for input(s): CPK, CPKMB, MB, TROPONINI in the last 72 hours.   EF   Date Value Ref Range Status   01/20/2023 58 % Final   12/23/2022 64 % Final          No results for input(s): COLORU, APPEARANCEUA, PHUR, SPECGRAV, PROTEINUA, GLUCUA, KETONESU, BILIRUBINUA, OCCULTUA, NITRITE, UROBILINOGEN, LEUKOCYTESUR, RBCUA, WBCUA, BACTERIA, SQUAMEPITHEL, HYALINECASTS in the last 48 hours.    Invalid input(s): WRIGHTSUR    Procalcitonin (ng/mL)   Date Value   02/14/2022 0.05   02/14/2022 0.06   04/14/2021 0.04   11/24/2018 0.37 (H)     Lactate (Lactic Acid) (mmol/L)   Date Value   02/14/2023 1.2   01/10/2023 2.0   12/23/2022 1.8   12/23/2022 3.6 (HH)   11/14/2022 1.4     BNP (pg/mL)   Date Value   11/24/2018 90   03/14/2017 87     CRP   Date Value   01/20/2023 9.4 mg/L (H)   01/10/2023 3.47 mg/dL (H)   11/16/2022 45.9 mg/L (H)   11/14/2022 11.10 mg/dL (H)   07/23/2022 1.93 mg/dL (H)   07/01/2022 1.53 mg/dL (H)   02/24/2022 7.1 mg/L   02/14/2022 29.2 mg/L (H)    02/14/2022 30.8 mg/L (H)   04/14/2021 47.5 mg/L (H)     Sed Rate (mm/Hr)   Date Value   01/20/2023 70 (H)   01/10/2023 55 (H)   11/16/2022 84 (H)   11/14/2022 67 (H)   02/24/2022 8   02/14/2022 57 (H)   02/14/2022 82 (H)   04/14/2021 57 (H)   05/13/2019 35 (H)   03/21/2017 21 (H)     No results found for: DDIMER  Ferritin (ng/mL)   Date Value   02/07/2022 126   03/21/2017 52   03/14/2017 67   12/06/2016 80     LD (U/L)   Date Value   04/24/2021 250   03/14/2017 253     Troponin I (ng/mL)   Date Value   02/08/2023 <0.006   11/24/2018 <0.006   03/20/2017 0.01   03/14/2017 <0.01     CPK (U/L)   Date Value   01/20/2023 48   02/14/2022 64   03/15/2017 44     No results found for this or any previous visit.  SARS-CoV2 (COVID-19) Qualitative PCR (no units)   Date Value   03/14/2022 Not Detected   02/20/2022 Not Detected   04/27/2021 Not Detected   04/22/2021 Not Detected     SARS-CoV-2 RNA, Amplification, Qual (no units)   Date Value   03/02/2022 Negative     POC Rapid COVID (no units)   Date Value   12/23/2022 Negative   12/10/2022 Negative   02/14/2022 Negative   04/14/2021 Negative       Microbiology labs for the last week  Microbiology Results (last 7 days)       Procedure Component Value Units Date/Time    AFB Culture & Smear [[id number]] Collected: 02/13/23 1529    Order Status: Completed Specimen: Ascites Updated: 02/14/23 1326     AFB CULTURE STAIN No acid fast bacilli seen.    Aerobic culture [[id number]] Collected: 02/10/23 1430    Order Status: Completed Specimen: Ascites Updated: 02/14/23 1036     Aerobic Bacterial Culture No growth    Fungus culture [[id number]] Collected: 02/13/23 1529    Order Status: Completed Specimen: Ascites Updated: 02/14/23 0936     Fungus (Mycology) Culture Culture in progress    Aerobic culture [[id number]] Collected: 02/13/23 1529    Order Status: Completed Specimen: Ascites Updated: 02/14/23 0902     Aerobic Bacterial Culture No growth    Culture, Anaerobic [[id number]] Collected:  02/10/23 1430    Order Status: Completed Specimen: Ascites Updated: 02/14/23 0844     Anaerobic Culture Culture in progress    Culture, Anaerobic [[id number]] Collected: 02/13/23 1529    Order Status: Completed Specimen: Ascites Updated: 02/14/23 0655     Anaerobic Culture Culture in progress    Gram stain [[id number]] Collected: 02/13/23 1529    Order Status: Completed Specimen: Ascites Updated: 02/13/23 1814     Gram Stain Result Rare WBC's      No organisms seen    AFB Culture & Smear [[id number]]     Order Status: Completed Specimen: Ascites     Gram stain [[id number]] Collected: 02/10/23 1430    Order Status: Completed Specimen: Ascites Updated: 02/11/23 0333     Gram Stain Result No WBC's      No organisms seen    Fungus culture [[id number]] Collected: 01/24/23 0913    Order Status: Completed Specimen: Body Fluid from Back Updated: 02/08/23 0655     Fungus (Mycology) Culture Culture in progress      No fungus isolated after 2 weeks    Narrative:      1) Perispinal    Fungus culture [[id number]] Collected: 01/24/23 0943    Order Status: Completed Specimen: Bone from Back Updated: 02/08/23 0655     Fungus (Mycology) Culture Culture in progress      No fungus isolated after 2 weeks    Narrative:      3) Perispinal    Fungus culture [[id number]] Collected: 01/24/23 0943    Order Status: Completed Specimen: Bone from Back Updated: 02/08/23 0655     Fungus (Mycology) Culture Culture in progress      No fungus isolated after 2 weeks    Narrative:      4) Perispinal    Fungus culture [[id number]] Collected: 01/24/23 0913    Order Status: Completed Specimen: Body Fluid from Back Updated: 02/08/23 0655     Fungus (Mycology) Culture Culture in progress      No fungus isolated after 2 weeks    Narrative:      2) Perispinal            Reviewed and noted in plan where applicable- Please see chart for full lab data.    Lines/Drains:  PICC Double Lumen 01/26/23 1209 left basilic (Active)   Line Necessity Review Longterm central  access required 02/08/23 1948   Verification by X-ray Yes 02/08/23 1948   Site Assessment No warmth;No swelling 02/08/23 1948   Extremity Assessment Distal to IV No abnormal discoloration 02/08/23 1948   Line Securement Device Secured with sutureless device 02/08/23 0730   Dressing Type Biopatch in place 02/08/23 1948   Dressing Status Clean;Dry;Intact 02/08/23 1948   Dressing Intervention Integrity maintained 02/08/23 1948   Date on Dressing 02/06/23 02/08/23 1948   Dressing Due to be Changed 02/13/23 02/08/23 1948   Distal Patency/Care flushed w/o difficulty 02/08/23 1948   Proximal 1 Patency/Care flushed w/o difficulty 02/08/23 1948   Current Insertion Depth (cm) 39 cm 01/26/23 1207   Current Exposed Catheter (cm) 0 cm 01/26/23 1207   Extremity Circumference (cm) 35 cm 01/26/23 1207   Waveform Not being transduced 02/08/23 0730   Number of days: 13            Midline Catheter Insertion/Assessment  - Single Lumen 01/12/23 2138 Right basilic vein (medial side of arm) 18g x 10cm (Active)   $ Midline Charges (Upon insertion) Bedside Insertion Performed Pt > 3 Years Old;Midline Catheter (Supply);Ultrasound Guide for Vascular Access 01/12/23 2142   Site Assessment Clean;Dry;Intact 02/08/23 1948   IV Device Securement catheter securement device 02/08/23 1948   Line Status Saline locked 02/08/23 1948   Dressing Type Biopatch in place 02/08/23 1948   Dressing Status Clean;Dry;Intact 02/08/23 1948   Dressing Intervention Integrity maintained 02/08/23 0730   Dressing Change Due 02/06/23 02/08/23 0730   Site Change Due 02/13/23 02/08/23 0730   Reason Not Rotated Not due 02/08/23 0730   Number of days: 27            Closed/Suction Drain 02/02/23 1401 Right Back Bulb 15 Fr. (Active)   Site Description Healing 02/08/23 1948   Dressing Type Transparent (Tegaderm) 02/08/23 1948   Dressing Status Clean;Dry;Intact 02/08/23 1948   Dressing Intervention Integrity maintained 02/08/23 1948   Drainage Serosanguineous 02/08/23 1948    Status Open to gravity drainage 02/08/23 1948   Output (mL) 10 mL 02/09/23 0530   Number of days: 6            Closed/Suction Drain 02/02/23 1402 Right Back Bulb 15 Fr. (Active)   Site Description Healing 02/08/23 1948   Dressing Type Transparent (Tegaderm) 02/08/23 1948   Dressing Status Clean;Dry;Intact 02/08/23 1948   Dressing Intervention Integrity maintained 02/08/23 1948   Drainage Serosanguineous 02/08/23 1948   Status Open to gravity drainage 02/08/23 1948   Output (mL) 30 mL 02/09/23 0530   Number of days: 6            Closed/Suction Drain 02/02/23 1402 Left Back Bulb 15 Fr. (Active)   Site Description Healing 02/08/23 1948   Dressing Type Transparent (Tegaderm) 02/08/23 1948   Dressing Status Clean;Dry;Intact 02/08/23 1948   Dressing Intervention Integrity maintained 02/08/23 1948   Drainage Serosanguineous 02/08/23 1948   Status Open to gravity drainage 02/08/23 1948   Output (mL) 15 mL 02/09/23 0530   Number of days: 6            Closed/Suction Drain 02/02/23 1402 Left Back Bulb 15 Fr. (Active)   Site Description Healing 02/08/23 1948   Dressing Type Transparent (Tegaderm) 02/08/23 1948   Dressing Status Clean;Dry;Intact 02/08/23 1948   Dressing Intervention Integrity maintained 02/08/23 1948   Drainage Serosanguineous 02/08/23 1948   Status Open to gravity drainage 02/08/23 1948   Output (mL) 10 mL 02/09/23 0530   Number of days: 6       Imaging      Results for orders placed during the hospital encounter of 01/19/23    Echo Saline Bubble? Yes    Interpretation Summary  · Study is negative for intercardiac shunt that is right to left ( see text).  · The left ventricle is normal in size with concentric remodeling and normal systolic function.  · The estimated ejection fraction is 58%.  · There are segmental left ventricular wall motion abnormalities.  · Normal left ventricular diastolic function.  · Normal right ventricular size with normal right ventricular systolic function.  · Mild right atrial  enlargement.  · Normal central venous pressure (3 mmHg).  · The estimated PA systolic pressure is 26 mmHg.      IR Paracentesis with Imaging  Narrative: EXAMINATION:  Ultrasound-guided paracentesis    Procedural Personnel    Attending physician(s): Guillermo Moore MD    Fellow physician(s): None    Resident physician(s): None    Advanced practice provider(s): CÉSAR Nelson, HENRI    Pre-procedure diagnosis: Ascites    Post-procedure diagnosis: Same    Complications: No immediate complications.    CLINICAL HISTORY:  Recurrent Ascites    TECHNIQUE:  - Ultrasound-guided paracentesis    COMPARISON:  Paracentesis 2/10/2023    FINDINGS:  Pre-procedure    Consent: Informed consent for the procedure was obtained and time-out was performed prior to the procedure.    Preparation: The site was prepared and draped using maximal sterile barrier technique including cutaneous antisepsis.    Anesthesia/sedation    Level of anesthesia/sedation: No sedation    Anesthesia/sedation administered by: Not applicable    Total intra-service sedation time (minutes): 0    Limited abdominal ultrasound    Limited abdominal ultrasound was performed.    Moderate ascites. A safe window for paracentesis was identified.    Paracentesis    Local anesthesia was administered. The peritoneal cavity was accessed on the right lower quadrant and fluid return confirmed position. Ascites was drained.    Paracentesis access technique: Real-time ultrasound guidance    Catheter placed: 5F Yueh    Closure    The catheter was removed. A sterile bandage was applied.    Post-drainage ultrasound: Not performed    Additional Details    Additional description of procedure: None    Equipment details: None    Specimens removed: Abdominal fluid    Estimated blood loss (mL): Less than 10    Standardized report: [email or web address]    Attestation    Signer name: Oscar    I attest that I reviewed the stored images and agree with the report as written.  Impression:  Ultrasound-guided paracentesis with drainage of 4700 mL of serous fluid.    _______________________________________________________________    Electronically signed by resident: Rachel Chauhan NP  Date:    02/13/2023  Time:    16:49    Electronically signed by: Guillermo Moore MD  Date:    02/14/2023  Time:    08:04      Labs, Imaging, EKG and Diagnostic results from 2/14/2023 were reviewed.    Medications:  Medication list was reviewed and changes noted under Assessment/Plan.  No current facility-administered medications on file prior to encounter.     Current Outpatient Medications on File Prior to Encounter   Medication Sig Dispense Refill    albuterol (ACCUNEB) 1.25 mg/3 mL Nebu Take 3 mLs (1.25 mg total) by nebulization every 6 (six) hours as needed (shortness of breath). Rescue 75 mL 0    buPROPion (WELLBUTRIN) 100 MG tablet Take 1 tablet (100 mg total) by mouth 2 (two) times daily. 60 tablet 2    folic acid (FOLVITE) 1 MG tablet Take 1 tablet (1 mg total) by mouth once daily. (Patient not taking: Reported on 11/25/2022) 42 tablet 0    gabapentin (NEURONTIN) 300 MG capsule Take 1 capsule (300 mg total) by mouth 3 (three) times daily. 90 capsule 11    lactulose (CHRONULAC) 20 gram/30 mL Soln Take 30 mLs (20 g total) by mouth 3 (three) times daily. 2700 mL 2    pantoprazole (PROTONIX) 40 MG tablet Take 1 tablet (40 mg total) by mouth once daily. 30 tablet 1    [DISCONTINUED] diclofenac sodium (VOLTAREN) 1 % Gel Apply 2 g topically 4 (four) times daily. 50 g 0     Scheduled Medications:  acetaminophen, 1,000 mg, Oral, Q12H  bisacodyL, 10 mg, Rectal, Every other day  buPROPion, 150 mg, Oral, Daily  dronabinoL, 2.5 mg, Oral, BID  DULoxetine, 30 mg, Oral, BID  furosemide (LASIX) injection, 80 mg, Intravenous, Q12H  gabapentin, 900 mg, Oral, TID  heparin (porcine), 5,000 Units, Subcutaneous, Q8H  lactulose, 20 g, Oral, TID  meropenem (MERREM) IVPB, 2 g, Intravenous, Q8H  methocarbamoL, 1,000 mg, Oral,  Q6H  pantoprazole, 40 mg, Oral, Daily  sodium chloride 0.9%, 10 mL, Intravenous, Q6H  spironolactone, 25 mg, Oral, Daily      PRN: sodium chloride, sodium chloride, sodium chloride, dextrose 10%, dextrose 10%, diphenhydrAMINE, glucagon (human recombinant), glucose, glucose, hydrALAZINE, HYDROmorphone, hydrOXYzine HCL, magnesium hydroxide 400 mg/5 ml, melatonin, naloxone, ondansetron, oxyCODONE **AND** oxyCODONE, polyethylene glycol, predniSONE, prochlorperazine, senna-docusate 8.6-50 mg, simethicone, sodium chloride 0.9%, Flushing PICC Protocol **AND** sodium chloride 0.9% **AND** sodium chloride 0.9%  Infusions:   Estimated Creatinine Clearance: 215.7 mL/min (based on SCr of 0.5 mg/dL).    Assessment/Plan:      * Weakness of both lower extremities  42 year old woman s/p multiple spinal surgeries presented to OSH with possible infection of shoulder. Found to have UTI and E. Coli bacteremia and progressively worse BLE weakness. Transferred to [facility] [place] for neurosurgical evaluation. Underwent Laminectomy T8-T10; Posterior spinal fusion T8-T12; hardware removal and washout on 1/24. Admitted to [facility] postop. On 2/2 underwent T4-pelvis fusion and T9-T10 corpectomies. To complete 8 week course of meropenem per ID for ESBL E coli from bone culture, end date 3/29.     - neuro checks q4h  - HV drains x 2 in place, management per NSGY  - meropenem for ESBL bacteremia per ID, eot 3/29/23  - Plastic surgery follow, wound vac discontinued  - CMP, Mag, Phos, CBC daily  - MAP goals >65, SBP < 180  - PRN labetalol, hydralazine  - MM pain regimen: PRN Dilaudid, Tylenol, gabapentin, robaxin, PRN oxycodone  - Aggressive bowel regimen  - PT/OT   2/9   On meropenem for MDR-ESCHERICHIA COLI ESBL  sodium trended down to 129.  Neurosurgery following post OR and aiding in pain management. PCA discontinued 2/8 . On oxycodone 10mg PO q4h . requiring IV dilaudid q4 as needed,. drains to gravity output 990ml/24h . Plastic surgery following flap,  managing wounds.  Wound VAC discontinued on Wednesday.  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  IR consulted  for lymphangiogram and  eval for  lymphatic repair          Ascites due to alcoholic cirrhosis  2/10  sono abdomen -Cirrhotic liver morphology.  Sequela of portal hypertension including moderate ascites, recanalized umbilical vein.  No focal hepatic lesions. Cholecystectomy. s/p IR paracentesis as above  2/11no evidence of SBP on paracentesis. 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 AFB and Triglyerides on ascitic fluid.    2/12  hepatology eval -If ascitic fluid triglyceride level is elevated, needs IR  eval for lymphangiogram and surgery eval for  lymphatic repair      Abdominal pain  2/10  abdominal X ray -  Nonobstructive bowel gas pattern.  No free air.  As before, mild gaseous distension of stomach. ammonia at 57 . sono abdomen with ascitis . simethicone PRN  2/11no evidence of SBP on paracentesis. 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 AFB and Triglyerides on ascitic fluid.        Hyponatremia  - Patient with sodium   Recent Labs   Lab 02/13/23  0500 02/13/23  2055 02/14/23  0340   *  128* 127* 130*   . sodium trending down    2/9 monitor on lasix BID.sodium trended down to 127.   2/10 sodium stable at 127 . continues with increased drain output 985ml/24h . negative balance of  2.8 L lasix on hold.  nephrology consulted.  Nephrology recs  fluid restriction to 1 L,  lasix 60 mg IV daily,  and trend sodium q12h.  diazepam discontinued   2/11  noncompliant with  fluid restriction to 1 L despite counseling. sodium trended down to 126 .received 2 doses of IV lasix 60mg. Increased Lasix IV 80 BID  due to UOP 500ml/24h   2/12 UOP of  1250ml/24h.sodium trended up to 129   2/14 sodium trended up to 130 . Diuresing well with lasix IV .     Acute blood loss anemia  Hb 6.4 on 2/6, received 1U pRBCs. Hb 2/7 WNL.  - monitor Hb  - transfuse to maintain Hb >7  2/9    Patient's with Normocytic anemia.. Hemoglobin stable. Etiology likely due to chronic disease .  Current CBC reviewed-    Recent Labs   Lab 02/12/23  0528 02/13/23  0500 02/14/23  0340   HGB 7.7* 7.8* 6.9*    Monitor CBC and transfuse if H/H drops below 7/21.   2/14 Hb 6.9 Transfusion with 1 unit of PRBC . FOBT.    Kyphosis of thoracolumbar region  See weakness    Thoracic discitis  See Weakness of both lower extremities    Anxiety and depression  Evaluated by Psychiatry at OSH prior to transfer. Recommended increasing bupropion.  - Continue bupropion 300 mg QD  - Gabapentin 600 mg TID for anxiety and neuropathy  - PRN diazepam 5 mg q6h discontinued  2/13   s/p psychiatry eval  for anxiety/depression. xanax discontinued. Decreased Bupropion to 150 mg PO Daily . Started Duloxetine 30 mg PO BID and  PRN Hydroxyzine 25 mg PO q8h for anxiety      Severe obesity (BMI >= 40)  Body mass index is 48.51 kg/m². Morbid obesity complicates all aspects of disease management from diagnostic modalities to treatment. Weight loss encouraged        Substance use disorder  Denies IV drug use (meth) for past 3 years. Denies alcohol and tobacco abuse.     Chronic hepatitis C with cirrhosis  See above    Cirrhosis of liver with ascites  MELD-Na score: 11 at 2/8/2023  4:55 AM  MELD score: 11 at 2/8/2023  4:55 AM  Calculated from:  Serum Creatinine: 0.5 mg/dL (Using min of 1 mg/dL) at 2/8/2023  4:55 AM  Serum Sodium: 131 mmol/L at 2/8/2023  4:55 AM  Total Bilirubin: 1.6 mg/dL at 2/8/2023  4:55 AM  INR(ratio): 1.3 at 2/6/2023 10:47 AM  Age: 42 years      Patient has reportedly refused transplant evaluation in the past.   - Daily CMP and trend LFTs  - Continue Lactulose 20 g TID  - Lasix 40 mg PO BID  - Will need  Hepatology follow up outpatient  2/10   ammonia at 57 . sono abdomen  . tylenol changed to 1000mg q12h with cirrhosis . s/p IR paracentesis today   2/11no evidence of SBP on paracentesis. 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 AFB and Triglyerides on ascitic fluid.    2/13 s/p paracentesis. Removed 4,700 ml. DVT sonogram LE negative.2/13 s/p paracentesis. Removed 4,700 ml. DVT sonogram LE negative. s/p psychiatry eval  for anxiety/depression. xanax discontinued. Decreased Bupropion to 150 mg PO Daily . Started Duloxetine 30 mg PO BID and  PRN Hydroxyzine 25 mg PO q8h for anxiety      VTE Risk Mitigation (From admission, onward)           Ordered     heparin (porcine) injection 5,000 Units  Every 8 hours         02/04/23 0848     IP VTE HIGH RISK PATIENT  Once         01/19/23 2153     Place sequential compression device  Until discontinued         01/19/23 2153     Reason for No Pharmacological VTE Prophylaxis  Once        Question:  Reasons:  Answer:  Physician Provided (leave comment)  Comment:  Potential NSGY procedure    01/19/23 2153                    Discharge Planning   SHIRA: 2/17/2023     Code Status: Partial Code   Is the patient medically ready for discharge?: No    Reason for patient still in hospital (select all that apply): Treatment  Discharge Plan A: Long-term acute care facility (LTAC)   Discharge Delays: None known at this time              Vincent Bal MD  Department of Hospital Medicine   [facility] - Telemetry Stepdown

## 2023-02-14 NOTE — PLAN OF CARE
Recommendations     1. Continue current Regular diet + ONS (fluid restriction per MD).  2. RD to monitor & follow-up.     Goals: Continue meeting % EEN/EPN by next RD f/u.  Nutrition Goal Status: goal met  Communication of RD Recs:  (POC)

## 2023-02-15 LAB
ALBUMIN FLD-MCNC: <0.4 G/DL
ALBUMIN SERPL BCP-MCNC: 1.8 G/DL (ref 3.5–5.2)
ALBUMIN SERPL BCP-MCNC: 2.1 G/DL (ref 3.5–5.2)
ALP SERPL-CCNC: 146 U/L (ref 55–135)
ALT SERPL W/O P-5'-P-CCNC: 29 U/L (ref 10–44)
AMMONIA PLAS-SCNC: 49 UMOL/L (ref 10–50)
ANION GAP SERPL CALC-SCNC: 5 MMOL/L (ref 8–16)
ANION GAP SERPL CALC-SCNC: 6 MMOL/L (ref 8–16)
APPEARANCE FLD: NORMAL
APPEARANCE FLD: NORMAL
AST SERPL-CCNC: 42 U/L (ref 10–40)
BASOPHILS # BLD AUTO: 0.01 K/UL (ref 0–0.2)
BASOPHILS NFR BLD: 0.4 % (ref 0–1.9)
BILIRUB SERPL-MCNC: 2.1 MG/DL (ref 0.1–1)
BODY FLD TYPE: NORMAL
BODY FLD TYPE: NORMAL
BODY FLUID SOURCE, LDH: NORMAL
BUN SERPL-MCNC: 31 MG/DL (ref 6–20)
BUN SERPL-MCNC: 32 MG/DL (ref 6–20)
CALCIUM SERPL-MCNC: 7.8 MG/DL (ref 8.7–10.5)
CALCIUM SERPL-MCNC: 8 MG/DL (ref 8.7–10.5)
CHLORIDE SERPL-SCNC: 97 MMOL/L (ref 95–110)
CHLORIDE SERPL-SCNC: 97 MMOL/L (ref 95–110)
CO2 SERPL-SCNC: 26 MMOL/L (ref 23–29)
CO2 SERPL-SCNC: 27 MMOL/L (ref 23–29)
COLOR FLD: YELLOW
COLOR FLD: YELLOW
CREAT SERPL-MCNC: 0.6 MG/DL (ref 0.5–1.4)
CREAT SERPL-MCNC: 0.6 MG/DL (ref 0.5–1.4)
DIFFERENTIAL METHOD: ABNORMAL
EOSINOPHIL # BLD AUTO: 0 K/UL (ref 0–0.5)
EOSINOPHIL NFR BLD: 0 % (ref 0–8)
ERYTHROCYTE [DISTWIDTH] IN BLOOD BY AUTOMATED COUNT: 18.6 % (ref 11.5–14.5)
EST. GFR  (NO RACE VARIABLE): >60 ML/MIN/1.73 M^2
EST. GFR  (NO RACE VARIABLE): >60 ML/MIN/1.73 M^2
GLUCOSE SERPL-MCNC: 104 MG/DL (ref 70–110)
GLUCOSE SERPL-MCNC: 113 MG/DL (ref 70–110)
GRAM STN SPEC: NORMAL
GRAM STN SPEC: NORMAL
HCT VFR BLD AUTO: 26.2 % (ref 37–48.5)
HGB BLD-MCNC: 8.2 G/DL (ref 12–16)
IMM GRANULOCYTES # BLD AUTO: 0.02 K/UL (ref 0–0.04)
IMM GRANULOCYTES NFR BLD AUTO: 0.7 % (ref 0–0.5)
LDH FLD L TO P-CCNC: 32 U/L
LYMPHOCYTES # BLD AUTO: 0.2 K/UL (ref 1–4.8)
LYMPHOCYTES NFR BLD: 6.5 % (ref 18–48)
LYMPHOCYTES NFR FLD MANUAL: 41 %
LYMPHOCYTES NFR FLD MANUAL: 45 %
MAGNESIUM SERPL-MCNC: 2.1 MG/DL (ref 1.6–2.6)
MCH RBC QN AUTO: 28.8 PG (ref 27–31)
MCHC RBC AUTO-ENTMCNC: 31.3 G/DL (ref 32–36)
MCV RBC AUTO: 92 FL (ref 82–98)
MONOCYTES # BLD AUTO: 0.2 K/UL (ref 0.3–1)
MONOCYTES NFR BLD: 6.8 % (ref 4–15)
MONOS+MACROS NFR FLD MANUAL: 41 %
MONOS+MACROS NFR FLD MANUAL: 44 %
NEUTROPHILS # BLD AUTO: 2.4 K/UL (ref 1.8–7.7)
NEUTROPHILS NFR BLD: 85.6 % (ref 38–73)
NEUTROPHILS NFR FLD MANUAL: 14 %
NEUTROPHILS NFR FLD MANUAL: 15 %
NRBC BLD-RTO: 0 /100 WBC
OB PNL STL: NEGATIVE
PHOSPHATE SERPL-MCNC: 3 MG/DL (ref 2.7–4.5)
PHOSPHATE SERPL-MCNC: 3.3 MG/DL (ref 2.7–4.5)
PLATELET # BLD AUTO: 91 K/UL (ref 150–450)
PMV BLD AUTO: 9.3 FL (ref 9.2–12.9)
POCT GLUCOSE: 128 MG/DL (ref 70–110)
POTASSIUM SERPL-SCNC: 3.9 MMOL/L (ref 3.5–5.1)
POTASSIUM SERPL-SCNC: 4.3 MMOL/L (ref 3.5–5.1)
PROT FLD-MCNC: <1 G/DL
PROT SERPL-MCNC: 4.8 G/DL (ref 6–8.4)
RBC # BLD AUTO: 2.85 M/UL (ref 4–5.4)
SODIUM SERPL-SCNC: 128 MMOL/L (ref 136–145)
SODIUM SERPL-SCNC: 130 MMOL/L (ref 136–145)
SPECIMEN SOURCE: NORMAL
SPECIMEN SOURCE: NORMAL
TRIGL FLD-MCNC: 479 MG/DL
WBC # BLD AUTO: 2.79 K/UL (ref 3.9–12.7)
WBC # FLD: 118 /CU MM
WBC # FLD: 118 /CU MM

## 2023-02-15 PROCEDURE — 82042 OTHER SOURCE ALBUMIN QUAN EA: CPT | Performed by: HOSPITALIST

## 2023-02-15 PROCEDURE — 99233 PR SUBSEQUENT HOSPITAL CARE,LEVL III: ICD-10-PCS | Mod: ,,,

## 2023-02-15 PROCEDURE — 25500020 PHARM REV CODE 255: Performed by: HOSPITALIST

## 2023-02-15 PROCEDURE — 25000003 PHARM REV CODE 250: Performed by: STUDENT IN AN ORGANIZED HEALTH CARE EDUCATION/TRAINING PROGRAM

## 2023-02-15 PROCEDURE — 27000207 HC ISOLATION

## 2023-02-15 PROCEDURE — 63600175 PHARM REV CODE 636 W HCPCS: Performed by: HOSPITALIST

## 2023-02-15 PROCEDURE — 25000003 PHARM REV CODE 250

## 2023-02-15 PROCEDURE — 63600175 PHARM REV CODE 636 W HCPCS: Mod: JG | Performed by: FAMILY MEDICINE

## 2023-02-15 PROCEDURE — 80069 RENAL FUNCTION PANEL: CPT | Performed by: HOSPITALIST

## 2023-02-15 PROCEDURE — 83615 LACTATE (LD) (LDH) ENZYME: CPT | Performed by: HOSPITALIST

## 2023-02-15 PROCEDURE — 63600175 PHARM REV CODE 636 W HCPCS: Performed by: STUDENT IN AN ORGANIZED HEALTH CARE EDUCATION/TRAINING PROGRAM

## 2023-02-15 PROCEDURE — A4216 STERILE WATER/SALINE, 10 ML: HCPCS | Performed by: PSYCHIATRY & NEUROLOGY

## 2023-02-15 PROCEDURE — 25000003 PHARM REV CODE 250: Performed by: INTERNAL MEDICINE

## 2023-02-15 PROCEDURE — 84157 ASSAY OF PROTEIN OTHER: CPT | Performed by: HOSPITALIST

## 2023-02-15 PROCEDURE — 99233 SBSQ HOSP IP/OBS HIGH 50: CPT | Mod: ,,, | Performed by: INTERNAL MEDICINE

## 2023-02-15 PROCEDURE — 25000003 PHARM REV CODE 250: Performed by: PSYCHIATRY & NEUROLOGY

## 2023-02-15 PROCEDURE — 99024 PR POST-OP FOLLOW-UP VISIT: ICD-10-PCS | Mod: ,,, | Performed by: PHYSICIAN ASSISTANT

## 2023-02-15 PROCEDURE — 84100 ASSAY OF PHOSPHORUS: CPT | Performed by: STUDENT IN AN ORGANIZED HEALTH CARE EDUCATION/TRAINING PROGRAM

## 2023-02-15 PROCEDURE — 63600175 PHARM REV CODE 636 W HCPCS: Performed by: NURSE PRACTITIONER

## 2023-02-15 PROCEDURE — 84478 ASSAY OF TRIGLYCERIDES: CPT | Performed by: HOSPITALIST

## 2023-02-15 PROCEDURE — P9047 ALBUMIN (HUMAN), 25%, 50ML: HCPCS | Mod: JG | Performed by: FAMILY MEDICINE

## 2023-02-15 PROCEDURE — 99233 SBSQ HOSP IP/OBS HIGH 50: CPT | Mod: ,,,

## 2023-02-15 PROCEDURE — 99024 POSTOP FOLLOW-UP VISIT: CPT | Mod: ,,, | Performed by: PHYSICIAN ASSISTANT

## 2023-02-15 PROCEDURE — 97530 THERAPEUTIC ACTIVITIES: CPT

## 2023-02-15 PROCEDURE — 99233 PR SUBSEQUENT HOSPITAL CARE,LEVL III: ICD-10-PCS | Mod: ,,, | Performed by: HOSPITALIST

## 2023-02-15 PROCEDURE — 87205 SMEAR GRAM STAIN: CPT | Performed by: HOSPITALIST

## 2023-02-15 PROCEDURE — 20600001 HC STEP DOWN PRIVATE ROOM

## 2023-02-15 PROCEDURE — 63600175 PHARM REV CODE 636 W HCPCS

## 2023-02-15 PROCEDURE — 99233 SBSQ HOSP IP/OBS HIGH 50: CPT | Mod: ,,, | Performed by: HOSPITALIST

## 2023-02-15 PROCEDURE — 85025 COMPLETE CBC W/AUTO DIFF WBC: CPT

## 2023-02-15 PROCEDURE — 97535 SELF CARE MNGMENT TRAINING: CPT

## 2023-02-15 PROCEDURE — 87070 CULTURE OTHR SPECIMN AEROBIC: CPT | Performed by: HOSPITALIST

## 2023-02-15 PROCEDURE — 83735 ASSAY OF MAGNESIUM: CPT | Performed by: STUDENT IN AN ORGANIZED HEALTH CARE EDUCATION/TRAINING PROGRAM

## 2023-02-15 PROCEDURE — 87075 CULTR BACTERIA EXCEPT BLOOD: CPT | Performed by: HOSPITALIST

## 2023-02-15 PROCEDURE — 89051 BODY FLUID CELL COUNT: CPT | Mod: 91 | Performed by: HOSPITALIST

## 2023-02-15 PROCEDURE — 25000003 PHARM REV CODE 250: Performed by: HOSPITALIST

## 2023-02-15 PROCEDURE — 82140 ASSAY OF AMMONIA: CPT | Performed by: HOSPITALIST

## 2023-02-15 PROCEDURE — 80053 COMPREHEN METABOLIC PANEL: CPT | Performed by: STUDENT IN AN ORGANIZED HEALTH CARE EDUCATION/TRAINING PROGRAM

## 2023-02-15 PROCEDURE — 25000003 PHARM REV CODE 250: Performed by: FAMILY MEDICINE

## 2023-02-15 PROCEDURE — 99233 PR SUBSEQUENT HOSPITAL CARE,LEVL III: ICD-10-PCS | Mod: ,,, | Performed by: INTERNAL MEDICINE

## 2023-02-15 RX ORDER — SPIRONOLACTONE 100 MG/1
100 TABLET, FILM COATED ORAL DAILY
Status: DISCONTINUED | OUTPATIENT
Start: 2023-02-16 | End: 2023-02-20

## 2023-02-15 RX ORDER — LIDOCAINE HYDROCHLORIDE 10 MG/ML
INJECTION INFILTRATION; PERINEURAL
Status: COMPLETED | OUTPATIENT
Start: 2023-02-15 | End: 2023-02-15

## 2023-02-15 RX ORDER — LIDOCAINE HYDROCHLORIDE 10 MG/ML
INJECTION INFILTRATION; PERINEURAL
Status: DISPENSED
Start: 2023-02-15 | End: 2023-02-16

## 2023-02-15 RX ORDER — SODIUM CHLORIDE 1 G/1
1000 TABLET ORAL 2 TIMES DAILY
Status: COMPLETED | OUTPATIENT
Start: 2023-02-15 | End: 2023-02-16

## 2023-02-15 RX ORDER — ALBUMIN HUMAN 250 G/1000ML
25 SOLUTION INTRAVENOUS ONCE
Status: COMPLETED | OUTPATIENT
Start: 2023-02-15 | End: 2023-02-15

## 2023-02-15 RX ORDER — ALBUMIN HUMAN 250 G/1000ML
SOLUTION INTRAVENOUS
Status: DISCONTINUED
Start: 2023-02-15 | End: 2023-02-15 | Stop reason: WASHOUT

## 2023-02-15 RX ADMIN — DULOXETINE 30 MG: 30 CAPSULE, DELAYED RELEASE ORAL at 09:02

## 2023-02-15 RX ADMIN — MEROPENEM 2 G: 1 INJECTION INTRAVENOUS at 09:02

## 2023-02-15 RX ADMIN — METHOCARBAMOL 1000 MG: 500 TABLET ORAL at 05:02

## 2023-02-15 RX ADMIN — DRONABINOL 2.5 MG: 2.5 CAPSULE ORAL at 09:02

## 2023-02-15 RX ADMIN — HYDROMORPHONE HYDROCHLORIDE 0.5 MG: 1 INJECTION, SOLUTION INTRAMUSCULAR; INTRAVENOUS; SUBCUTANEOUS at 05:02

## 2023-02-15 RX ADMIN — DRONABINOL 2.5 MG: 2.5 CAPSULE ORAL at 10:02

## 2023-02-15 RX ADMIN — Medication 6 MG: at 10:02

## 2023-02-15 RX ADMIN — ACETAMINOPHEN 1000 MG: 500 TABLET ORAL at 10:02

## 2023-02-15 RX ADMIN — SPIRONOLACTONE 25 MG: 25 TABLET, FILM COATED ORAL at 09:02

## 2023-02-15 RX ADMIN — BUPROPION HYDROCHLORIDE 150 MG: 150 TABLET, FILM COATED, EXTENDED RELEASE ORAL at 09:02

## 2023-02-15 RX ADMIN — HEPARIN SODIUM 5000 UNITS: 5000 INJECTION INTRAVENOUS; SUBCUTANEOUS at 05:02

## 2023-02-15 RX ADMIN — HYDROMORPHONE HYDROCHLORIDE 0.5 MG: 1 INJECTION, SOLUTION INTRAMUSCULAR; INTRAVENOUS; SUBCUTANEOUS at 09:02

## 2023-02-15 RX ADMIN — DULOXETINE 30 MG: 30 CAPSULE, DELAYED RELEASE ORAL at 10:02

## 2023-02-15 RX ADMIN — Medication 10 ML: at 12:02

## 2023-02-15 RX ADMIN — FUROSEMIDE 10 MG/HR: 10 INJECTION, SOLUTION INTRAMUSCULAR; INTRAVENOUS at 05:02

## 2023-02-15 RX ADMIN — MEROPENEM 2 G: 1 INJECTION INTRAVENOUS at 02:02

## 2023-02-15 RX ADMIN — PREDNISONE 50 MG: 20 TABLET ORAL at 12:02

## 2023-02-15 RX ADMIN — PANTOPRAZOLE SODIUM 40 MG: 40 TABLET, DELAYED RELEASE ORAL at 09:02

## 2023-02-15 RX ADMIN — DIPHENHYDRAMINE HYDROCHLORIDE 25 MG: 25 CAPSULE ORAL at 12:02

## 2023-02-15 RX ADMIN — FUROSEMIDE 80 MG: 10 INJECTION, SOLUTION INTRAMUSCULAR; INTRAVENOUS at 09:02

## 2023-02-15 RX ADMIN — METHOCARBAMOL 1000 MG: 500 TABLET ORAL at 12:02

## 2023-02-15 RX ADMIN — GABAPENTIN 900 MG: 300 CAPSULE ORAL at 05:02

## 2023-02-15 RX ADMIN — Medication 10 ML: at 05:02

## 2023-02-15 RX ADMIN — HYDROXYZINE HYDROCHLORIDE 25 MG: 25 TABLET, FILM COATED ORAL at 10:02

## 2023-02-15 RX ADMIN — MEROPENEM 2 G: 1 INJECTION INTRAVENOUS at 05:02

## 2023-02-15 RX ADMIN — Medication 10 ML: at 06:02

## 2023-02-15 RX ADMIN — GABAPENTIN 900 MG: 300 CAPSULE ORAL at 10:02

## 2023-02-15 RX ADMIN — LIDOCAINE HYDROCHLORIDE 5 ML: 10 INJECTION, SOLUTION INFILTRATION; PERINEURAL at 02:02

## 2023-02-15 RX ADMIN — SODIUM CHLORIDE 1000 MG: 1 TABLET ORAL at 10:02

## 2023-02-15 RX ADMIN — ACETAMINOPHEN 1000 MG: 500 TABLET ORAL at 09:02

## 2023-02-15 RX ADMIN — GABAPENTIN 900 MG: 300 CAPSULE ORAL at 09:02

## 2023-02-15 RX ADMIN — LACTULOSE 20 G: 20 SOLUTION ORAL at 10:02

## 2023-02-15 RX ADMIN — IOHEXOL 100 ML: 350 INJECTION, SOLUTION INTRAVENOUS at 03:02

## 2023-02-15 RX ADMIN — LACTULOSE 20 G: 20 SOLUTION ORAL at 05:02

## 2023-02-15 RX ADMIN — OXYCODONE HYDROCHLORIDE 10 MG: 10 TABLET ORAL at 10:02

## 2023-02-15 RX ADMIN — HEPARIN SODIUM 5000 UNITS: 5000 INJECTION INTRAVENOUS; SUBCUTANEOUS at 10:02

## 2023-02-15 RX ADMIN — ALBUMIN (HUMAN) 25 G: 12.5 SOLUTION INTRAVENOUS at 05:02

## 2023-02-15 NOTE — PT/OT/SLP PROGRESS
"Physical Therapy Co-Treatment    Co-Tx with OT due to decreased activity tolerance and anticipated level of skilled assistance required for functional mobility    Patient Name:  Rachel Zazueta   MRN:  [id number]    Recommendations:     Discharge Recommendations: nursing facility, skilled  Discharge Equipment Recommendations: hospital bed, lift device, wheelchair  Barriers to discharge: Inaccessible home and Decreased caregiver support    Assessment:     Rachel Zazueta is a 42 y.o. female admitted with a medical diagnosis of Weakness of both lower extremities.  She presents with the following impairments/functional limitations: weakness, impaired endurance, impaired self care skills, impaired functional mobility, decreased lower extremity function, pain, impaired cardiopulmonary response to activity, orthopedic precautions.    Limited activity tolerance this date requiring MAXAx2 to roll ANTONIO directions and MAXA to maintain s/l for breif/bedding change, TD for pericare. Notable 8/10 Abdominal pain in s/l limiting activity tolerance, med team advised. TD for repositioning in supine, concluded all needs met. Patient presents below PLOF and would benefit from acute care skilled PT to address deficits detailed above and promote safe and functional independence.    Rehab Prognosis: Fair; patient would benefit from acute skilled PT services to address these deficits and reach maximum level of function.    Recent Surgery: Procedure(s) (LRB):  LAMINECTOMY, SPINE, THORACIC, WITH FUSION (T4-Pelvis fusion w/ T9-10 corpectomies) **AIRO (N/A) 13 Days Post-Op    Plan:     During this hospitalization, patient to be seen 3 x/week to address the identified rehab impairments via therapeutic activities, therapeutic exercises, neuromuscular re-education and progress toward the following goals:    Plan of Care Expires:  03/05/23    Subjective     Chief Complaint: "My stomach hurts"  Patient/Family Comments/goals: Go to " rehab  Pain/Comfort:  Pain Rating 1: 8/10  Location - Side 1: Bilateral  Location - Orientation 1: generalized  Location 1: abdomen  Pain Addressed 1: Distraction, Pre-medicate for activity, Cessation of Activity, Reposition  Pain Rating Post-Intervention 1: other (see comments) (did not rate, pain relieved in supine exacerbated in s/l)      Objective:     Communicated with RN, OT prior to session.  Patient found supine with MIHIR drain, telemetry upon PT entry to room.     General Precautions: Standard, contact, fall  Orthopedic Precautions: spinal precautions  Braces: TLSO  Respiratory Status: Room air initially, shortness of breath patient requests NC (2lpm)     Functional Mobility:  Bed Mobility:     Rolling Left:  maximal assistance and of 2 persons  Rolling Right: maximal assistance and of 2 persons  Scooting: total assistance and of 2 persons      AM-PAC 6 CLICK MOBILITY  Turning over in bed (including adjusting bedclothes, sheets and blankets)?: 2  Sitting down on and standing up from a chair with arms (e.g., wheelchair, bedside commode, etc.): 1  Moving from lying on back to sitting on the side of the bed?: 2  Moving to and from a bed to a chair (including a wheelchair)?: 1  Need to walk in hospital room?: 1  Climbing 3-5 steps with a railing?: 1  Basic Mobility Total Score: 8       Treatment & Education:  Performed functional bed mobility training detailed above.     Patient educated on role of physical therapy, PT POC, importance of OOB activity, safe functional mobility techniques, use of call light, presence of staff for all out of bed mobility at this time. Allotted time for all questions to be answered. Patient verbalizes understanding of education provided.       Patient left HOB elevated with all lines intact and call button in reach..    GOALS:   Multidisciplinary Problems       Physical Therapy Goals          Problem: Physical Therapy    Goal Priority Disciplines Outcome Goal Variances Interventions    Physical Therapy Goal     PT, PT/OT Ongoing, Progressing     Description: Goals to be met by: 23, extended goals to      Patient will increase functional independence with mobility by performin. Supine to sit with Moderate Assistance  2. Sit to supine with Moderate Assistance  3. Rolling to Left and Right with Moderate Assistance.  4. Sit to stand transfer with Maximum Assistance  5. Sit edge of bed with ANTONIO UE support for 8 min with contact guard assist to prepare for functional activities in sitting.                          Time Tracking:     PT Received On: 02/15/23  PT Start Time: 1015     PT Stop Time: 1045  PT Total Time (min): 30 min     Billable Minutes: Therapeutic Activity 30    Treatment Type: Treatment        PTA Visit Number: 0     02/15/2023

## 2023-02-15 NOTE — TREATMENT PLAN
Hepatology Treatment Plan    Rachel Zazueta is a 42 y.o. female admitted to hospital 1/19/2023 (Hospital Day: 28) due to Weakness of both lower extremities.     Interval History  Patient seen this AM. No acute events overnight. Underwent CT scan abdomen & pelvis - scattered peritoneal fluid, cirrhosis and splenomegaly.    Urine output was 8.3 L in the last 24 hours. Patient receiving IV Lasix 40 mg BID and PO Aldactone 35 mg Qday.     Patient has depressed affect and is tearful.     Objective  Temp:  [96.5 °F (35.8 °C)-98.5 °F (36.9 °C)] 97.6 °F (36.4 °C) (02/15 0813)  Pulse:  [] 112 (02/15 0813)  BP: ()/(55-72) 146/65 (02/15 0813)  Resp:  [12-20] 18 (02/15 0911)  SpO2:  [96 %-99 %] 97 % (02/15 0813)  Arterial Line BP: (102)/(60) 102/60 (02/14 1615)    Constitutional: Appears well-developed and well-nourished. severe obesity  Head: Normocephalic and atraumatic.   Neck: Normal range of motion. Neck supple.   Cardiovascular: Normal heart rate.  Regular heart rhythm.  Pulmonary/Chest: Effort normal.   Abdominal:+  distension.  No tenderness  Musculoskeletal: Normal range of motion. ++ edema. drains in place   Neurological: Alert and oriented to person, place, and time. LE strength - 3-4/5   Skin: Skin is warm and dry.   Psychiatric: Normal mood and affect. Behavior is normal.     Laboratory    Lab Results   Component Value Date    WBC 2.79 (L) 02/15/2023    HGB 8.2 (L) 02/15/2023    HCT 26.2 (L) 02/15/2023    MCV 92 02/15/2023    PLT 91 (L) 02/15/2023       Lab Results   Component Value Date     (L) 02/15/2023    K 4.3 02/15/2023    CL 97 02/15/2023    CO2 26 02/15/2023    BUN 32 (H) 02/15/2023    CREATININE 0.6 02/15/2023    CALCIUM 7.8 (L) 02/15/2023       Lab Results   Component Value Date    ALBUMIN 1.8 (L) 02/15/2023    ALT 29 02/15/2023    AST 42 (H) 02/15/2023    GGT 57 (H) 02/07/2022    ALKPHOS 146 (H) 02/15/2023    BILITOT 2.1 (H) 02/15/2023       Lab Results   Component Value Date    INR  1.3 (H) 02/06/2023    INR 1.2 02/01/2023    INR 1.4 (H) 01/25/2023       MELD-Na score: 11 at 2/8/2023  4:55 AM  MELD score: 11 at 2/8/2023  4:55 AM  Calculated from:  Serum Creatinine: 0.5 mg/dL (Using min of 1 mg/dL) at 2/8/2023  4:55 AM  Serum Sodium: 131 mmol/L at 2/8/2023  4:55 AM  Total Bilirubin: 1.6 mg/dL at 2/8/2023  4:55 AM  INR(ratio): 1.3 at 2/6/2023 10:47 AM  Age: 42 years    Assessment     Rachel Zazueta is a 42 y.o. female with history of IVDU (methamphetamine), HCV cirrhosis, spinal fusion with hardware with history of epidural abscess and hardware removal (Feb 2022) who is here for thoracic discitis with ESBL E. Coli s/p laminectomy, thoracic-pelvis fusion with corpectomies by NSGY and flap closures by plastics. 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 Would likely be from recent surgical interventions interrupting lymphatic drainage. Has known HCV cirrhosis, no prior ascites. Ascitic TG level pending. SAAG elevated consistent with portal HTN. 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 AFB culture stain negative. TTE showed EF 58%.     Problem List:  Chylous ascites  HCV cirrhosis  MELD-Na score: 11 at 2/8/2023  4:55 AM  MELD score: 11 at 2/8/2023  4:55 AM  Calculated from:  Serum Creatinine: 0.5 mg/dL (Using min of 1 mg/dL) at 2/8/2023  4:55 AM  Serum Sodium: 131 mmol/L at 2/8/2023  4:55 AM  Total Bilirubin: 1.6 mg/dL at 2/8/2023  4:55 AM  INR(ratio): 1.3 at 2/6/2023 10:47 AM  Age: 42 years     Plan  - Would increase the dose of spironolactone to 100 mg Qday. Continue IV Lasix. Strict intake/output.   - Low sodium, high-protein and low-fat diet with medium-chain triglycerides (MCT) diet. Consult nutrition.   - Large volume paracentesis as needed.   - Hold off on octreotide for now.   - Consult IR for  possible lymphangiogram and/or lymphatic repair  - Follow AFB culture.   - please obtain daily CBC, CMP, INR  - Plan of care was discussed with primary team    Thank you for involving us in the care of Rachel Marbin. Please call with any additional concerns or questions.We will continue to follow.     Luis Angel Chandra MD, PGY-IV  Gastroenterology Fellow  [facility]

## 2023-02-15 NOTE — ASSESSMENT & PLAN NOTE
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  IR consulted  for lymphangiogram and  eval for  lymphatic repair  2/15  IR eval -repeat paracentesis to trend volume output and  conservative management    spironolactone  increased to 100 mg Qday. Continue IV Lasix. Dietary consulted for  high-protein and low-fat diet with medium-chain triglycerides  diet.

## 2023-02-15 NOTE — PROGRESS NOTES
"[facility] - Telemetry Stepdown  Nephrology  Progress Note    Patient Name: Rachel Zazueta  MRN: [id number]  Admission Date: 1/19/2023  Hospital Length of Stay: 27 days  Attending Provider: Vincent Bal MD   Primary Care Physician: Dannielle Merino DO  Principal Problem:Weakness of both lower extremities    Subjective:     HPI: Per HM note: "42 y.o F w/ hx ofIV drug use (methamphetamine), chronic HCV with cirrhosis, spinal fusion (04/2021), epidural abscess with removal of hardware (02/2022), spinal fusion with hardware (T10 - Pelvis / 03/2022), obesity (BMI 39.3) and neurogenic bladder and recent shoulder surgery who initially presented to [facility] for evaluation of back pain and left leg weakness. She reports initially noting the left leg weakness when she was about to go to the bathroom and was about to fall but was assisted by her boyfriend.  She was brought to the ED via EMS. Urinalysis with UTI concerns and blood cultures at OSH grew ESBL E coli, and shoulder effusion concern for infection so she was treated with meropenem.  During hospitalization, she reports the weakness that started out in her left leg initially then spread upwards to her left thigh, left hip, then crossed over to the right hip and spread to her right leg.  Denies recent IV drug use. She reports her last incidence of drug use was more than 3 years ago and also denies tobacco, and alcohol abuse.  Reports cannabis use.     OSH course notable for concerning urinalysis and blood cultures positive for ESBL E coli treated with meropenem.  She was also admit to the ICU where she was treated with Precedex for significant body aches, irritability, and muscle spasms.  Concerns of a murmur on physical exam so she underwent TTE which did not show any vegetations.  Worsening lower extremity weakness workup with MRI head nonspecific, MRI cervical spine with multilevel spondylosis, X-ray spine with multilevel thoracolumbar fusion and " "diskectomy, focal kyphosis at T9-10 increased compared to prior.  She was started on prophylaxis amoxicillin due to her history of infected hardware.  MRI spine unable to be completed due to patient's body habitus so she was transferred to [facility] for further imaging and Neurosurgery, Neurology evaluation." Underwent laminectomy, posterior fusion and washout on 1/24. ID following for discitis, has her on meropenem. Stepped down to  on 2/8.     Nephrology consulted for hyponatremia. Patient reports uncomfortable abdominal distension and leg swelling. Reports drinking 3-4 cups of her 30oz water tumbler daily. Urine Na <10, urine osm 513. Serum Na trend 136--> 132-->131-->129-->127-->135-->134-->127. Normal mentation. Cr/BUN wnl. Albumin 1.8, protein 4.5, bili 1.4.          Interval History: no acute events overnight. Serum sodium decreased to 128  Intake not being recorded    Review of patient's allergies indicates:   Allergen Reactions    Bee venom protein (honey bee) Anaphylaxis     Patient reports she is allergic to bee stings.    Naproxen Anaphylaxis     Throat closing    12-23- Patient reports taking Ibuprofen 200 mg at home without problems. Verified X3. [place]    Wasp sting [allergen ext-venom-honey bee] Anaphylaxis    Adhesive Blisters    Shellfish containing products     Iodine and iodide containing products Hives and Itching     Allergic to iodine in seafood only    Nuts [tree nut] Rash     Current Facility-Administered Medications   Medication Frequency    0.9%  NaCl infusion (for blood administration) Q24H PRN    0.9%  NaCl infusion (for blood administration) Q24H PRN    0.9%  NaCl infusion (for blood administration) Q24H PRN    acetaminophen tablet 1,000 mg Q12H    bisacodyL suppository 10 mg Every other day    buPROPion TB24 tablet 150 mg Daily    dextrose 10% bolus 125 mL 125 mL PRN    dextrose 10% bolus 250 mL 250 mL PRN    diphenhydrAMINE capsule 25 mg On Call Procedure    dronabinoL " capsule 2.5 mg BID    DULoxetine DR capsule 30 mg BID    furosemide (LASIX) 500 mg in 50 mL infusion (conc: 10 mg/mL) Continuous    gabapentin capsule 900 mg TID    glucagon (human recombinant) injection 1 mg PRN    glucose chewable tablet 16 g PRN    glucose chewable tablet 24 g PRN    heparin (porcine) injection 5,000 Units Q8H    hydrALAZINE tablet 25 mg Q8H PRN    HYDROmorphone injection 0.5 mg Q4H PRN    hydrOXYzine HCL tablet 25 mg TID PRN    lactulose 20 gram/30 mL solution Soln 20 g TID    LIDOcaine HCL 10 mg/ml (1%) 10 mg/mL (1 %) injection     magnesium hydroxide 400 mg/5 ml suspension 2,400 mg Daily PRN    melatonin tablet 6 mg Nightly PRN    meropenem (MERREM) 2 g in sodium chloride 0.9% 100 mL IVPB Q8H    methocarbamoL tablet 1,000 mg Q6H    naloxone 0.4 mg/mL injection 0.02 mg PRN    ondansetron injection 4 mg Q6H PRN    oxyCODONE immediate release tablet 5 mg Q3H PRN    And    oxyCODONE immediate release tablet Tab 10 mg Q3H PRN    pantoprazole EC tablet 40 mg Daily    polyethylene glycol packet 17 g Daily PRN    predniSONE tablet 50 mg On Call Procedure    prochlorperazine injection Soln 2.5 mg Q6H PRN    senna-docusate 8.6-50 mg per tablet 1 tablet BID PRN    simethicone chewable tablet 80 mg TID PRN    sodium chloride 0.9% flush 10 mL Q12H PRN    sodium chloride 0.9% flush 10 mL Q6H    And    sodium chloride 0.9% flush 10 mL PRN    sodium chloride tablet tbso 1,000 mg BID    spironolactone tablet 25 mg Daily       Objective:     Vital Signs (Most Recent):  Temp: 97.8 °F (36.6 °C) (02/15/23 1113)  Pulse: (!) 116 (02/15/23 1130)  Resp: 19 (02/15/23 1113)  BP: (!) 105/56 (02/15/23 1113)  SpO2: 97 % (02/15/23 1113)   Vital Signs (24h Range):  Temp:  [97.4 °F (36.3 °C)-98.5 °F (36.9 °C)] 97.8 °F (36.6 °C)  Pulse:  [] 116  Resp:  [12-20] 19  SpO2:  [96 %-99 %] 97 %  BP: ()/(55-72) 105/56  Arterial Line BP: (102)/(60) 102/60     Weight: (!) 140.7 kg (310 lb 3 oz)  (02/15/23 0436)  Body mass index is 48.58 kg/m².  Body surface area is 2.58 meters squared.    I/O last 3 completed shifts:  In: 1000 [P.O.:1000]  Out: 7165 [Urine:6425; Drains:740]    Physical Exam  Constitutional:       General: She is not in acute distress.     Appearance: Normal appearance. She is obese. She is not ill-appearing.   HENT:      Head: Normocephalic and atraumatic.   Eyes:      General: No scleral icterus.  Cardiovascular:      Rate and Rhythm: Normal rate.      Pulses: Normal pulses.   Pulmonary:      Effort: Pulmonary effort is normal.   Abdominal:      General: There is distension.      Tenderness: There is abdominal tenderness. There is no guarding.   Musculoskeletal:      Right lower leg: Edema present.      Left lower leg: Edema present.   Skin:     General: Skin is warm and dry.      Coloration: Skin is not jaundiced.   Neurological:      Mental Status: She is alert and oriented to person, place, and time.   Psychiatric:         Behavior: Behavior normal.       Significant Labs:  All labs within the past 24 hours have been reviewed.     Significant Imaging:  Labs: Reviewed    Assessment/Plan:     Hyponatremia  42 y.o F w/ hx of IV drug use (meth), chronic HCV with cirrhosis, spinal fusion (04/2021), epidural abscess with removal of hardware (02/2022), spinal fusion with hardware (T10 - Pelvis / 03/2022), and neurogenic bladder here with complicated ESBL E.coli epidural abscess s/p washout,corpectomy, fixation being treated w/ meropenem.     Nephrology consulted for hyponatremia. Patient reports uncomfortable abdominal distension and leg swelling. Reports drinking 3-4 cups of her 30oz water tumbler daily. Urine Na <10, urine osm 513. Serum osm 283. Serum Na stable. Normal mentation.    Hyponatremia likely 2/2 to low effective circulating volume 2/2 cirrhosis. Possible poor solute intake relative to free water intake. Unlikely SIADH given low urine Na.   Repeat urine studies 2/14 suggestive of  appropriate ADH in setting of cirrhosis    - Fluid restrict to 1 L daily to avoid worsening of hyponatremia. Patient currently not compliant w/ fluid restriction despite counseling. Discussed with patient today  - Needs more solute, therefore 1g NaCl tabs for 2 doses today  - Lasix gtt @ 10 for 6 hours, hopeful for more naturiesis  - Asymptomatic, no role for hypertonic saline  - q12h Na check 1600 2/15      Chronic hepatitis C with cirrhosis  Will need to f/u with outpatient hepatology for antiviral treatment    Cirrhosis of liver with ascites  S/p para on 2/10  On lactulose, lasix and aldactone  Rest of management per primary        Thank you for your consult. I will follow-up with patient. Please contact us if you have any additional questions.    Spencer Khan MD  Nephrology  [facility] - Telemetry Stepdown

## 2023-02-15 NOTE — CONSULTS
[facility]  Psychology    Treatment Attempt  Patient Name: Rachel Zazueta   MRN: [id number]      Patient off the floor for a procedure. Psychology will attempt again on Friday, 2/17/23. Thank you for the consult request.       Manpreet Yousif, PhD  Dept. of Psychiatry  [facility]

## 2023-02-15 NOTE — PLAN OF CARE
Pt arrived to MPU 1 for paracentesis.  Name verified using two identifiers.  Allergies verified. VSS.  Will continue to monitor.

## 2023-02-15 NOTE — SUBJECTIVE & OBJECTIVE
Interval History: NAEON. Neuro exam unchanged, ongoing severe BLE edema and abdominal distension. CT A/P showed scattered peritoneal fluid with cirrhosis, no enhancing collections or masses, expected haloing of iliac screws. Hepatology following for chylous ascites. Drain output trending down, H/H improved s/p 1u RBC transfusion.    Medications:  Continuous Infusions:   furosemide (LASIX) 10 mg/mL infusion (non-titrating)       Scheduled Meds:   acetaminophen  1,000 mg Oral Q12H    bisacodyL  10 mg Rectal Every other day    buPROPion  150 mg Oral Daily    dronabinoL  2.5 mg Oral BID    DULoxetine  30 mg Oral BID    gabapentin  900 mg Oral TID    heparin (porcine)  5,000 Units Subcutaneous Q8H    lactulose  20 g Oral TID    meropenem (MERREM) IVPB  2 g Intravenous Q8H    methocarbamoL  1,000 mg Oral Q6H    pantoprazole  40 mg Oral Daily    sodium chloride 0.9%  10 mL Intravenous Q6H    sodium chloride  1,000 mg Oral BID    spironolactone  25 mg Oral Daily     PRN Meds:sodium chloride, sodium chloride, sodium chloride, dextrose 10%, dextrose 10%, diphenhydrAMINE, glucagon (human recombinant), glucose, glucose, hydrALAZINE, HYDROmorphone, hydrOXYzine HCL, magnesium hydroxide 400 mg/5 ml, melatonin, naloxone, ondansetron, oxyCODONE **AND** oxyCODONE, polyethylene glycol, predniSONE, prochlorperazine, senna-docusate 8.6-50 mg, simethicone, sodium chloride 0.9%, Flushing PICC Protocol **AND** sodium chloride 0.9% **AND** sodium chloride 0.9%     Review of Systems  Objective:     Weight: (!) 140.7 kg (310 lb 3 oz)  Body mass index is 48.58 kg/m².  Vital Signs (Most Recent):  Temp: 97.8 °F (36.6 °C) (02/15/23 1113)  Pulse: (!) 116 (02/15/23 1130)  Resp: 19 (02/15/23 1113)  BP: (!) 105/56 (02/15/23 1113)  SpO2: 97 % (02/15/23 1113)   Vital Signs (24h Range):  Temp:  [97.4 °F (36.3 °C)-98.5 °F (36.9 °C)] 97.8 °F (36.6 °C)  Pulse:  [] 116  Resp:  [12-20] 19  SpO2:  [96 %-99 %] 97 %  BP: ()/(55-72) 105/56  Arterial  Line BP: (102)/(60) 102/60     Date 02/15/23 0700 - 02/16/23 0659   Shift 8322-5693 8720-2164 0701-5905 24 Hour Total   INTAKE   Shift Total(mL/kg)       OUTPUT   Urine(mL/kg/hr) 1075   1075   Shift Total(mL/kg) 1075(7.6)   1075(7.6)   Weight (kg) 140.7 140.7 140.7 140.7                        Closed/Suction Drain 02/02/23 1401 Right Back Bulb 15 Fr. (Active)   Site Description Healing 02/14/23 2100   Dressing Type Other (Comment) 02/10/23 2000   Dressing Status Clean;Dry;Intact 02/14/23 2100   Dressing Intervention Integrity maintained 02/14/23 2100   Drainage Serosanguineous 02/14/23 2100   Status Open to gravity drainage 02/14/23 2100   Output (mL) 50 mL 02/14/23 1800            Closed/Suction Drain 02/02/23 1402 Right Back Bulb 15 Fr. (Active)   Site Description Healing 02/14/23 2100   Dressing Type Other (Comment) 02/13/23 1958   Dressing Status Intact;Dry;Clean 02/14/23 2100   Dressing Intervention Integrity maintained 02/14/23 2100   Drainage Serosanguineous 02/14/23 2100   Status Open to gravity drainage 02/14/23 2100   Output (mL) 50 mL 02/14/23 1800            Closed/Suction Drain 02/02/23 1402 Left Back Bulb 15 Fr. (Active)   Site Description Edema/swelling 02/13/23 1958   Dressing Type Other (Comment) 02/13/23 1958   Dressing Status Clean;Dry;Intact 02/13/23 1958   Dressing Intervention Integrity maintained 02/13/23 1958   Drainage Serosanguineous 02/13/23 1958   Status Open to gravity drainage 02/13/23 1958   Output (mL) 50 mL 02/14/23 1800            Closed/Suction Drain 02/02/23 1402 Left Back Bulb 15 Fr. (Active)   Site Description Other (Comment) 02/13/23 1958   Dressing Type Other (Comment) 02/13/23 1958   Dressing Status Intact 02/13/23 1958   Dressing Intervention Integrity maintained 02/13/23 1958   Drainage Serosanguineous 02/13/23 1958   Status Open to gravity drainage 02/13/23 1958   Output (mL) 50 mL 02/14/23 1800       Female External Urinary Catheter 02/10/23 4659 (Active)   Skin no  breakdown;no redness;female external urine collection device repositioned 02/14/23 2100   Tolerance no signs/symptoms of discomfort 02/14/23 2100   Suction Continuous suction at 60 mmHg 02/14/23 2100   Date of last wick change 02/14/23 02/14/23 2236   Time of last wick change 2236 02/14/23 2236   Output (mL) 1800 mL 02/14/23 1800       Physical Exam    Neurosurgery Physical Exam    General: well developed, well nourished, no distress.   Head: normocephalic, atraumatic  Neurologic: Alert and oriented. Thought content appropriate.  GCS: Motor: 6/Verbal: 5/Eyes: 4 GCS Total: 15  Mental Status: Awake, Alert, Oriented x 4  Language: No aphasia  Speech: No dysarthria  Cranial nerves: face symmetric, tongue midline, CN II-XII grossly intact.   Eyes: pupils equal, round, reactive to light with accommodation, EOMI. Dysconjugate gaze.  Pulmonary: normal respirations, no signs of respiratory distress  Skin: Skin is warm, dry and intact.  Sensory: intact to light touch throughout  Motor Strength: Moves all extremities spontaneously.     Strength   Deltoids Triceps Biceps Wrist Extension Wrist Flexion Hand    Upper: R 5/5 5/5 5/5 5/5 5/5 5/5     L 5/5 5/5 5/5 5/5 5/5 5/5       Iliopsoas Quadriceps Knee  Flexion Tibialis  anterior Gastro- cnemius EHL   Lower: R 3/5 3-/5 3/5 3/5 3/5 2/5     L 3/5 3-/5 3/5 3/5 3/5 3/5      BLE strength/ROM limited due to pain, body habitus and severe bilateral leg edema.     Thoracolumbar incision: C/D/I with staples. Skin edges well approximated. No surrounding erythema or edema. No drainage or TTP.      MIHIR drains x 4 intact to gravity with ss output.    Significant Labs:  Recent Labs   Lab 02/14/23  0340 02/14/23  1508 02/15/23  0421   GLU 93 81 113*   * 128* 128*   K 3.9 4.3 4.3    97 97   CO2 25 27 26   BUN 31* 33* 32*   CREATININE 0.5 0.5 0.6   CALCIUM 6.7* 7.7* 7.8*   MG 1.8  --  2.1     Recent Labs   Lab 02/14/23  0340 02/14/23  1508 02/15/23  0421   WBC 4.14 4.32 2.79*   HGB  6.9* 7.9* 8.2*   HCT 22.7* 25.7* 26.2*   * 105* 91*     No results for input(s): LABPT, INR, APTT in the last 48 hours.  Microbiology Results (last 7 days)       Procedure Component Value Units Date/Time    Culture, Anaerobic [[id number]] Collected: 02/10/23 1430    Order Status: Completed Specimen: Ascites Updated: 02/15/23 1034     Anaerobic Culture Culture in progress    Aerobic culture [[id number]]     Order Status: No result Specimen: Ascites     Culture, Anaerobic [[id number]]     Order Status: No result Specimen: Ascites     Gram stain [[id number]]     Order Status: No result Specimen: Ascites     Culture, Anaerobic [[id number]] Collected: 02/13/23 1529    Order Status: Completed Specimen: Ascites Updated: 02/15/23 0804     Anaerobic Culture Culture in progress    AFB Culture & Smear [[id number]] Collected: 02/13/23 1529    Order Status: Completed Specimen: Ascites Updated: 02/14/23 2127     AFB Culture & Smear Culture in progress     AFB CULTURE STAIN No acid fast bacilli seen.    Aerobic culture [[id number]] Collected: 02/10/23 1430    Order Status: Completed Specimen: Ascites Updated: 02/14/23 1036     Aerobic Bacterial Culture No growth    Fungus culture [[id number]] Collected: 02/13/23 1529    Order Status: Completed Specimen: Ascites Updated: 02/14/23 0936     Fungus (Mycology) Culture Culture in progress    Aerobic culture [[id number]] Collected: 02/13/23 1529    Order Status: Completed Specimen: Ascites Updated: 02/14/23 0902     Aerobic Bacterial Culture No growth    Gram stain [[id number]] Collected: 02/13/23 1529    Order Status: Completed Specimen: Ascites Updated: 02/13/23 1814     Gram Stain Result Rare WBC's      No organisms seen    AFB Culture & Smear [[id number]]     Order Status: Completed Specimen: Ascites     Gram stain [[id number]] Collected: 02/10/23 1430    Order Status: Completed Specimen: Ascites Updated: 02/11/23 0333     Gram Stain Result No WBC's      No organisms seen          All  pertinent labs from the last 24 hours have been reviewed.    Significant Diagnostics:  CT: CT Abdomen Pelvis With Contrast    Result Date: 2/15/2023  Findings consistent with cirrhosis.  Splenomegaly, Recanalized umbilical vein, scattered peritoneal fluid, and small nodular liver noted. Nonobstructing left renal stones. Postop change in the spine.  There is lucency about the bi iliac screws concerning for loosening.  Bony destruction T10 with interbody strut. Additional findings above. Electronically signed by: Celio Zuniga MD Date:    02/15/2023 Time:    07:51     I have reviewed and interpreted all pertinent imaging results/findings within the past 24 hours.

## 2023-02-15 NOTE — PROCEDURES
Radiology Post-Procedure Note    Pre Op Diagnosis: Ascites  Post Op Diagnosis: Same    Procedure: Ultrasound Guided Paracentesis    Procedure performed by: Gissel ALFONSO, Rachel     Written Informed Consent Obtained: Yes  Specimen Removed: YES clear yellow  Estimated Blood Loss: Minimal    Findings:   Successful paracentesis.  Albumin administered PRN per protocol.    Patient tolerated procedure well.    Rachel Chauhan, APRN, FNP  Interventional Radiology  [phone] clinic

## 2023-02-15 NOTE — OP NOTE
Stage 2 of 2 planned stages    DATE OF SURGERY: 2/2/23     SURGEON: Mario Alberto Camilo M.D.     CO-SURGEON: Guille Templeton M.D., Neurosurgery    PREOPERATIVE DIAGNOSIS:  1. 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 Stage 1 wound washout and temporary fixation T7-T12  3. Polysubstance abuse, Class III obesity     POSTOPERATIVE DIAGNOSIS:  1. 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 Stage 1 wound washout and temporary fixation T7-T12  3. Polysubstance abuse, Class III obesity     PROCEDURE PERFORMED:  1. Wound washout and excisional debridement T4 to pelvis  2. Removal and reinsertion of spinal hardware, T7-T12  3. Revision posterior spinal fusion, T4 to pelvis   4. Posterior segmental spinal fixation, T4 to pelvis (DePuy Synthes)  5. Pelvic fixation with bilateral S2AI screws and a right sided iliac bolt (Depuy Synthes)  6. T9 and T10 corpectomy from a right LATERAL EXTRACAVITARY approach  7. Anterior interbody fusion T8 to T11  8. Placement of expandable titanium corpectomy cage, T9 and T10 (Globus)  9. Use of intraoperative CT neuronavigation  10.  Infuse and cadaveric bone grafting     ANESTHESIA: GETA     ESTIMATED BLOOD LOSS: 1000mL     COMPLICATIONS: None     DRAINS: Per Plastic Surgery     SPECIMENS SENT: None     FINDINGS: None     INDICATIONS:     Please see Dr. Templeton's note for full description of the indications for this operation.    PROCEDURE:     The patient was brought into the operating room where she was intubated and placed under general anesthesia without difficulty.  All lines were placed. She was repositioned prone onto the operating room table with appropriate padding all pressure points. The region of interest from T4 to pelvis was localized with AP and lateral x-ray.  The skin was marked and the area was prepped and draped in the usual sterile fashion.  A timeout was performed prior to procedure.      We removed the prior  staples and cut the subcutaneous sutures to open the recent incision from T4 to sacrum. We washed out the soft tissues above the fascia with a Pulsevac. We then cut the fascial sutures, exposing the posterior elements from T4 to the sacrum and the recent temporary hardware from T7 to T12. We removed the antibiotic beads placed last time and washed out the entire wound with Betadine, peroxide and Pulsevac lavage. We removed the set screws and rods from the temporary construct from T7 to T12, but kept the screws in place.  We placed upsized diameter screws into the previous lumbar tracts from L1 to S1, skipping left L4 due to inadequate purchase.    The CT neuro navigation array was docked onto a thoracic spinous process and a CT spin was acquired.  The intraoperative CT confirmed levels and good hardware position.  Using neuro-navigation pedicle screws were placed bilaterally from T4 to T7.  Next we placed the CT array onto the right PSIS through a separate incision and acquired a second spin.  Using navigation we placed bilateral with S2AI screws. Additionally we placed a right sided iliac bolt with navigation. Spinal xrays confirmed good hardware position. We augmented bilateral T5 screws with cement under flouroscopy, and we confirmed no significant cement extravasation.    Next we performed a T9 and T10 corpectomy from a right sided lateral extracavitary approach. We placed a temporary frankie on the left side. We removed the right sided T9 and T10 pedicles and rib heads with rongeurs. We sacrificed the right T9 and T10 nerve roots to gain access to our corpectomy site. We performed a T9 and T10 corpectomy with navigated osteotomes and ronguers, removing greater than 50% of each level in the process. There was some pus seen in this space and we washed it out with Betadine. We sized the defect and placed a titanium expandable cage until snug fit. The kyphotic deformity also reduced during this maneuver. The cage was  packed with synthetic bone for anterior arthrodesis from T8 to T11.  Xrays showed great cage position.    Bilateral titanium rods were sized, contoured and reduced into the tulip heads.  Set screws were tightened. A left sided accessory frankie spanning the corpectomy was placed and secured with side connectors to the primary frankie. A right sided kickstand frankie was placed from the right iliac bolt to the right primary frankie at the thoracolumbar junction using side connectors. Final xrays showed excellent reduction of the kyphotic deformity and excellent position of all hardware.  The wound was copiously irrigated with sterile normal saline and a dilute Betadine solution. Exposed bony surfaces from T4 to the sacrum were decorticated bilaterally with a high-speed drill.  Infuse, cancellous bone chips and synthetic putty were placed posteriorly for arthrodesis from T4 to sacrum. The wound was handed over to Plastic surgery for their complex wound revision and closure.     JUSTIFICATION OF CO-SURGEONS and MODIFIER 22: This was a complex multistage spinal fusion surgery and corpectomy for complex infection and kyphotic deformity in a high risk patient with Class III obesity, polysubstance abuse and medicel noncompliance. Two spine attending surgeons were indicated to reduce operative times, blood loss, and to improve patient outcomes. Plastic surgery was indicated for the complex wound closure. The inherent risks of the procedure were significantly elevated over similar procedures given her major comorbid conditions.

## 2023-02-15 NOTE — ASSESSMENT & PLAN NOTE
Pt is 42F multiple spinal surgeries and revisions last T10- Pelvis in March 2022 who presented to OSH with concern for shoulder infection and UTI found to have E coli sepsis who has had progressive worsening BLE weakness. 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 Transferred to [facility] to  and neurosurgery was consulted.    OR 1/24 for temporary T6-12 PSIF. Taz pus noted intraoperatively.   OR 2/2 for T4-pelvis revision and extension of fusion with T9-10 corpectomy with plastic surgery assistance.  MAP goals > 85 completed 2/5 in ICU.    Plan:  --Stepped down to  floor.   -q4h neurochecks.  --All labs & diagnostics reviewed.   - CT A/P w/ contrast 2/14: scattered peritoneal fluid, findings consistent with cirrhosis, splenomegaly. No enhancing masses or fluid collections. All visualized hardware in good position with some expected haloing around bilateral iliac screws (seen on prior imaging).   -Post op xrays pending. Try to obtain x-rays upright prior to discharge.  --TLSO to be worn when OOB only.  --MIHIR drains x4 to gravity, monitor output qshift. Per plastics - keep all drains until discharge -- management of drains per plastics. Following peripherally.  --Plastic surgery managing incision. Open to air.   - Off-load pressure as much as possible to promote wound healing.   - Elevate with wedge, alternate sides Q2H  --Thoracic discitis: BCx 1/20 no growth. OR cultures 1/24 grew ESBL E. coli   -- ID consulted. Recs for IV meropenem with estimated end date of 3/29/23.  --Pain Control: Continue multimodal regimen. PCA pump discontinued 2/8 with poor pain control subsequently.    - Anesthesia Pain Service consulted due to refractory pain, appreciate assistance. Continue current regimen.    - No NSAIDs as pt s/p recent instrumented fusion.  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 Do not suspect trauma to thoracic duct in relation  to recent surgeries as etiology. Hepatology following. Other DDx could be decompensation of liver disease vs TB (AFB culture stain negative).   - Management per Hepatology recs: spironolactone, IV lasix, dietary restrictions; paracentesis PRN  --Acute blood loss anemia: Goal Hbg > 7. Transfuse PRN.  --DVT PPx: SQH/TEDs/SCDs.  --NSGY will continue to follow peripherally for wound checks and as needed. Please contact team with any further questions.

## 2023-02-15 NOTE — PT/OT/SLP PROGRESS
"Occupational Therapy   Co-Treatment w/ PT    Name: Rachel Zazueta  MRN: [id number]  Admitting Diagnosis:  Weakness of both lower extremities  13 Days Post-Op    Recommendations:     Discharge Recommendations: nursing facility, skilled  Discharge Equipment Recommendations:  hospital bed, lift device  Barriers to discharge:       Assessment:     Rachel Zazueta is a 42 y.o. female with a medical diagnosis of Weakness of both lower extremities.  Therapy session focused on repositioning and  toileting this date.  Pt required Max- Total A this date 2/2 to pain and generalized limited mobility.   She presents with deficits listed below. Performance deficits affecting function are weakness, impaired endurance, impaired self care skills, impaired functional mobility, impaired balance, orthopedic precautions, decreased lower extremity function, decreased upper extremity function, decreased ROM.     Rehab Prognosis:  Fair; patient would benefit from acute skilled OT services to address these deficits and reach maximum level of function.       Plan:     Patient to be seen 3 x/week to address the above listed problems via self-care/home management, therapeutic activities, therapeutic exercises, neuromuscular re-education  Plan of Care Expires: 02/20/23  Plan of Care Reviewed with: patient    Subjective    " My stomach hurts so bad, please when we roll don't touch my back"  Pain/Comfort:  Pain Rating 1: 8/10  Location - Side 1: Bilateral  Location - Orientation 1: generalized  Location 1: abdomen  Pain Addressed 1: Pre-medicate for activity, Reposition, Distraction, Cessation of Activity  Pain Rating Post-Intervention 1:  (No updated rating provided)    Objective:     Communicated with: Nurse Duarte prior to session.  Patient found supine with telemetry, MIHIR drain, PureWick upon OT entry to room.    General Precautions: Standard, fall, contact    Orthopedic Precautions:spinal precautions  Braces: TLSO  Respiratory Status: Room " air upon arrival, Pt with Nasal Cannula and      Occupational Performance:     Bed Mobility:    Patient completed Rolling/Turning to Left with  maximal assistance and 2 persons  Patient completed Rolling/Turning to Right with maximal assistance and 2 persons  Patient completed Scooting/Bridging with maximal assistance and 2 persons     Functional Mobility/Transfers:  Further mobility defererd this date, session focused on repositioning and toileting    Activities of Daily Living:  Lower Body Dressing: total assistance with donning brief and non slip socks  Toileting: total assistance with clothes management and hygiene.      [facility] 6 Click ADL: 14    Treatment & Education:  Role of OT and OT POC  Educated on continued therapy participation and impact on increasing functional independence.      Patient left HOB elevated with all lines intact and call button in reach    GOALS:   Multidisciplinary Problems       Occupational Therapy Goals          Problem: Occupational Therapy    Goal Priority Disciplines Outcome Interventions   Occupational Therapy Goal     OT, PT/OT Ongoing, Progressing    Description: Goals to be met by: 2/10     Patient will increase functional independence with ADLs by performing:    UE Dressing with Moderate Assistance.  Grooming while seated with Minimal Assistance.  Sitting at edge of bed x15 minutes with Minimal Assistance.  Rolling to Bilateral with Minimal Assistance.   Supine to sit with Moderate Assistance.                         Time Tracking:     OT Date of Treatment: 02/15/23  OT Start Time: 1018  OT Stop Time: 1042  OT Total Time (min): 24 min    Billable Minutes:Self Care/Home Management 24    OT/ROSS: OT          2/15/2023

## 2023-02-15 NOTE — CONSULTS
"Nutrition consult received stating "needs Low sodium, high-protein and low-fat diet with medium-chain triglycerides (MCT) diet".  Diet changed in Epic & kitchen notified.    RD to follow-up as previously scheduled.    Thanks,  Nicole, MS, RD, LDN  "

## 2023-02-15 NOTE — H&P
Inpatient Radiology Pre-procedure Note    History of Present Illness:  Rachel Zazueta is a 42 y.o. female who presents for ultrasound guided paracentesis.  Admission H&P reviewed.  Past Medical History:   Diagnosis Date    Anemia     Anxiety     Depressed     Diskitis     Hep C w/o coma, chronic     IV drug abuse     Kidney stone     Liver cirrhosis      Past Surgical History:   Procedure Laterality Date    ARTHROTOMY OF SHOULDER Left 11/15/2022    Procedure: ARTHROTOMY, SHOULDER;  Surgeon: Bjorn Hayes MD;  Location: [facility];  Service: Orthopedics;  Laterality: Left;  Left acromioclavicular joint arthrotomy    BACK SURGERY      benine tumor removal      forehead, age 9    CHOLECYSTECTOMY      KIDNEY SURGERY      LUMBAR FUSION Bilateral 3/2/2022    Procedure: FUSION, SPINE, LUMBAR;  Surgeon: Guille Templeton MD;  Location: [facility] OR [address] [place] [address];  Service: Neurosurgery;  Laterality: Bilateral;  AIRO  T10--Pelvis    LUMBAR FUSION N/A 1/24/2023    Procedure: **AIRO** T8-T12 POSTERIOR FUSION, T8-T10 LAMINECTOMY, HARDWARE REMOVAL AND WASHOUT;  Surgeon: Guille Templeton MD;  Location: [facility] OR [address] [place] [address];  Service: Neurosurgery;  Laterality: N/A;    REMOVAL OF HARDWARE FROM SPINE N/A 2/21/2022    Procedure: REMOVAL, HARDWARE, SPINE;  Surgeon: Guille Templeton MD;  Location: [facility] OR [address] [place] [address];  Service: Neurosurgery;  Laterality: N/A;  [place]    SPINE SURGERY      THORACIC LAMINECTOMY WITH FUSION N/A 2/2/2023    Procedure: LAMINECTOMY, SPINE, THORACIC, WITH FUSION (T4-Pelvis fusion w/ T9-10 corpectomies) **AIRO;  Surgeon: Guille Templeton MD;  Location: [facility] OR [address] [place] [address];  Service: Neurosurgery;  Laterality: N/A;  AIRO, 2 bovies, aquamantys, Globus, Depuy, antibiotic beads, TXA, Peroxide; [employer] plastics closure       Review of Systems:   As documented in primary team H&P    Home Meds:   Prior to Admission medications    Medication Sig Start Date End Date Taking? Authorizing Provider   albuterol (ACCUNEB) 1.25 mg/3 mL Nebu Take 3 mLs  (1.25 mg total) by nebulization every 6 (six) hours as needed (shortness of breath). Rescue 12/10/22 12/10/23  Farzana Elizabeth NP   buPROPion (WELLBUTRIN) 100 MG tablet Take 1 tablet (100 mg total) by mouth 2 (two) times daily. 11/22/22 11/22/23  HENRI Marcus   folic acid (FOLVITE) 1 MG tablet Take 1 tablet (1 mg total) by mouth once daily.  Patient not taking: Reported on 11/25/2022 11/22/22 1/3/23  Minnie Cortez MD   gabapentin (NEURONTIN) 300 MG capsule Take 1 capsule (300 mg total) by mouth 3 (three) times daily. 1/4/23 1/4/24  Dannielle Merino DO   lactulose (CHRONULAC) 20 gram/30 mL Soln Take 30 mLs (20 g total) by mouth 3 (three) times daily. 1/4/23 4/4/23  Dannielle Merino DO   pantoprazole (PROTONIX) 40 MG tablet Take 1 tablet (40 mg total) by mouth once daily. 3/23/22 11/25/22  Sai Red III, MD   diclofenac sodium (VOLTAREN) 1 % Gel Apply 2 g topically 4 (four) times daily. 12/31/21 3/11/22  Femi Galicia NP     Scheduled Meds:    acetaminophen  1,000 mg Oral Q12H    bisacodyL  10 mg Rectal Every other day    buPROPion  150 mg Oral Daily    dronabinoL  2.5 mg Oral BID    DULoxetine  30 mg Oral BID    gabapentin  900 mg Oral TID    heparin (porcine)  5,000 Units Subcutaneous Q8H    lactulose  20 g Oral TID    LIDOcaine HCL 10 mg/ml (1%)        meropenem (MERREM) IVPB  2 g Intravenous Q8H    methocarbamoL  1,000 mg Oral Q6H    pantoprazole  40 mg Oral Daily    sodium chloride 0.9%  10 mL Intravenous Q6H    sodium chloride  1,000 mg Oral BID    [START ON 2/16/2023] spironolactone  100 mg Oral Daily     Continuous Infusions:    furosemide (LASIX) 10 mg/mL infusion (non-titrating)       PRN Meds:sodium chloride, sodium chloride, sodium chloride, dextrose 10%, dextrose 10%, diphenhydrAMINE, glucagon (human recombinant), glucose, glucose, hydrALAZINE, HYDROmorphone, hydrOXYzine HCL, magnesium hydroxide 400 mg/5 ml, melatonin, naloxone, ondansetron, oxyCODONE **AND**  oxyCODONE, polyethylene glycol, predniSONE, prochlorperazine, senna-docusate 8.6-50 mg, simethicone, sodium chloride 0.9%, Flushing PICC Protocol **AND** sodium chloride 0.9% **AND** sodium chloride 0.9%  Anticoagulants/Antiplatelets: no anticoagulation    Allergies:   Review of patient's allergies indicates:   Allergen Reactions    Bee venom protein (honey bee) Anaphylaxis     Patient reports she is allergic to bee stings.    Naproxen Anaphylaxis     Throat closing    12-23- Patient reports taking Ibuprofen 200 mg at home without problems. Verified X3. [place]    Wasp sting [allergen ext-venom-honey bee] Anaphylaxis    Adhesive Blisters    Shellfish containing products     Iodine and iodide containing products Hives and Itching     Allergic to iodine in seafood only    Nuts [tree nut] Rash     Sedation Hx: have not been any systemic reactions    Vitals:  Temp: 97.8 °F (36.6 °C) (02/15/23 1113)  Pulse: (!) 116 (02/15/23 1130)  Resp: 19 (02/15/23 1113)  BP: (!) 105/56 (02/15/23 1113)  SpO2: 97 % (02/15/23 1113)     Physical Exam:  ASA: 3  Mallampati: n/a    General: no acute distress  Mental Status: drowsy and arousable; oriented to person, place and time  HEENT: normocephalic, atraumatic  Chest: unlabored breathing  Heart: regular heart rate  Abdomen: distended  Extremity: moves all extremities    Plan: ultrasound guided paracentesis  Sedation Plan: local    CÉSAR Nelson, BKP  Interventional Radiology  [phone] [facility]

## 2023-02-15 NOTE — ASSESSMENT & PLAN NOTE
42 y.o F w/ hx of IV drug use (meth), chronic HCV with cirrhosis, spinal fusion (04/2021), epidural abscess with removal of hardware (02/2022), spinal fusion with hardware (T10 - Pelvis / 03/2022), and neurogenic bladder here with complicated ESBL E.coli epidural abscess s/p washout,corpectomy, fixation being treated w/ meropenem.     Nephrology consulted for hyponatremia. Patient reports uncomfortable abdominal distension and leg swelling. Reports drinking 3-4 cups of her 30oz water tumbler daily. Urine Na <10, urine osm 513. Serum osm 283. Serum Na stable. Normal mentation.    Hyponatremia likely 2/2 to low effective circulating volume 2/2 cirrhosis. Possible poor solute intake relative to free water intake. Unlikely SIADH given low urine Na.   Repeat urine studies 2/14 suggestive of appropriate ADH in setting of cirrhosis    - Fluid restrict to 1 L daily to avoid worsening of hyponatremia. Patient currently not compliant w/ fluid restriction despite counseling. Discussed with patient today  - Needs more solute, therefore 1g NaCl tabs for 2 doses today  - Lasix gtt @ 10 for 6 hours, hopeful for more naturiesis  - Asymptomatic, no role for hypertonic saline  - q12h Na check 1600 2/15

## 2023-02-15 NOTE — PROGRESS NOTES
[facility] - Telemetry Stepdown  Neurosurgery  Progress Note    Subjective:     History of Present Illness: Ms. Zazueta is a 42 y.o F w/ hx ofIV drug use (methamphetamine), chronic HCV with cirrhosis, spinal fusion (04/2021), epidural abscess with removal of hardware (02/2022), spinal fusion with hardware (T10 - Pelvis / 03/2022), obesity (BMI 39.3) and neurogenic bladder and recent shoulder surgery who initially presented to [facility] for evaluation of back pain and left leg weakness.  She reports initially noting the left leg weakness when she was about to go to the bathroom and was about to fall but was assisted by her boyfriend.  She was brought to the ED via EMS.  Urinalysis with UTI concerns and blood cultures at OSH grew ESBL E coli, and shoulder effusion concern for infection so she was treated with meropenem.  During hospitalization, she reports the weakness that started out in her left leg initially then spread upwards to her left thigh, left hip, then crossed over to the right hip and spread to her right leg.  Denies recent IV drug use. She reports her last incidence of drug use was more than 3 years ago and also denies tobacco, and alcohol abuse.  Reports cannabis use.     OSH course notable for concerning urinalysis and blood cultures positive for ESBL E coli treated with meropenem.  She was also admit to the ICU where she was treated with Precedex for significant body aches, irritability, and muscle spasms.  Concerns of a murmur on physical exam so she underwent TTE which did not show any vegetations.  Worsening lower extremity weakness workup with MRI head nonspecific, MRI cervical spine with multilevel spondylosis, X-ray spine with multilevel thoracolumbar fusion and diskectomy, focal kyphosis at T9-10 increased compared to prior.  She was started on prophylaxis amoxicillin due to her history of infected hardware.  MRI spine unable to be completed due to patient's body habitus so she was  [place] [facility] [place], Neurology evaluation.      Post-Op Info:  Procedure(s) (LRB):  LAMINECTOMY, SPINE, THORACIC, WITH FUSION (T4-Pelvis fusion w/ T9-10 corpectomies) **AIRO (N/A)   13 Days Post-Op     Interval History: NAEON. Neuro exam unchanged, ongoing severe BLE edema and abdominal distension. CT A/P showed scattered peritoneal fluid with cirrhosis, no enhancing collections or masses, expected haloing of iliac screws. Hepatology following for chylous ascites. Drain output trending down, H/H improved s/p 1u RBC transfusion.    Medications:  Continuous Infusions:   furosemide (LASIX) 10 mg/mL infusion (non-titrating)       Scheduled Meds:   acetaminophen  1,000 mg Oral Q12H    bisacodyL  10 mg Rectal Every other day    buPROPion  150 mg Oral Daily    dronabinoL  2.5 mg Oral BID    DULoxetine  30 mg Oral BID    gabapentin  900 mg Oral TID    heparin (porcine)  5,000 Units Subcutaneous Q8H    lactulose  20 g Oral TID    meropenem (MERREM) IVPB  2 g Intravenous Q8H    methocarbamoL  1,000 mg Oral Q6H    pantoprazole  40 mg Oral Daily    sodium chloride 0.9%  10 mL Intravenous Q6H    sodium chloride  1,000 mg Oral BID    spironolactone  25 mg Oral Daily     PRN Meds:sodium chloride, sodium chloride, sodium chloride, dextrose 10%, dextrose 10%, diphenhydrAMINE, glucagon (human recombinant), glucose, glucose, hydrALAZINE, HYDROmorphone, hydrOXYzine HCL, magnesium hydroxide 400 mg/5 ml, melatonin, naloxone, ondansetron, oxyCODONE **AND** oxyCODONE, polyethylene glycol, predniSONE, prochlorperazine, senna-docusate 8.6-50 mg, simethicone, sodium chloride 0.9%, Flushing PICC Protocol **AND** sodium chloride 0.9% **AND** sodium chloride 0.9%     Review of Systems  Objective:     Weight: (!) 140.7 kg (310 lb 3 oz)  Body mass index is 48.58 kg/m².  Vital Signs (Most Recent):  Temp: 97.8 °F (36.6 °C) (02/15/23 1113)  Pulse: (!) 116 (02/15/23 1130)  Resp: 19 (02/15/23  1113)  BP: (!) 105/56 (02/15/23 1113)  SpO2: 97 % (02/15/23 1113)   Vital Signs (24h Range):  Temp:  [97.4 °F (36.3 °C)-98.5 °F (36.9 °C)] 97.8 °F (36.6 °C)  Pulse:  [] 116  Resp:  [12-20] 19  SpO2:  [96 %-99 %] 97 %  BP: ()/(55-72) 105/56  Arterial Line BP: (102)/(60) 102/60     Date 02/15/23 0700 - 02/16/23 0659   Shift 2864-5110 2904-4295 4876-5976 24 Hour Total   INTAKE   Shift Total(mL/kg)       OUTPUT   Urine(mL/kg/hr) 1075   1075   Shift Total(mL/kg) 1075(7.6)   1075(7.6)   Weight (kg) 140.7 140.7 140.7 140.7                        Closed/Suction Drain 02/02/23 1401 Right Back Bulb 15 Fr. (Active)   Site Description Healing 02/14/23 2100   Dressing Type Other (Comment) 02/10/23 2000   Dressing Status Clean;Dry;Intact 02/14/23 2100   Dressing Intervention Integrity maintained 02/14/23 2100   Drainage Serosanguineous 02/14/23 2100   Status Open to gravity drainage 02/14/23 2100   Output (mL) 50 mL 02/14/23 1800            Closed/Suction Drain 02/02/23 1402 Right Back Bulb 15 Fr. (Active)   Site Description Healing 02/14/23 2100   Dressing Type Other (Comment) 02/13/23 1958   Dressing Status Intact;Dry;Clean 02/14/23 2100   Dressing Intervention Integrity maintained 02/14/23 2100   Drainage Serosanguineous 02/14/23 2100   Status Open to gravity drainage 02/14/23 2100   Output (mL) 50 mL 02/14/23 1800            Closed/Suction Drain 02/02/23 1402 Left Back Bulb 15 Fr. (Active)   Site Description Edema/swelling 02/13/23 1958   Dressing Type Other (Comment) 02/13/23 1958   Dressing Status Clean;Dry;Intact 02/13/23 1958   Dressing Intervention Integrity maintained 02/13/23 1958   Drainage Serosanguineous 02/13/23 1958   Status Open to gravity drainage 02/13/23 1958   Output (mL) 50 mL 02/14/23 1800            Closed/Suction Drain 02/02/23 1402 Left Back Bulb 15 Fr. (Active)   Site Description Other (Comment) 02/13/23 1958   Dressing Type Other (Comment) 02/13/23 1958   Dressing Status Intact 02/13/23  1958   Dressing Intervention Integrity maintained 02/13/23 1958   Drainage Serosanguineous 02/13/23 1958   Status Open to gravity drainage 02/13/23 1958   Output (mL) 50 mL 02/14/23 1800       Female External Urinary Catheter 02/10/23 2323 (Active)   Skin no breakdown;no redness;female external urine collection device repositioned 02/14/23 2100   Tolerance no signs/symptoms of discomfort 02/14/23 2100   Suction Continuous suction at 60 mmHg 02/14/23 2100   Date of last wick change 02/14/23 02/14/23 2236   Time of last wick change 2236 02/14/23 2236   Output (mL) 1800 mL 02/14/23 1800       Physical Exam    Neurosurgery Physical Exam    General: well developed, well nourished, no distress.   Head: normocephalic, atraumatic  Neurologic: Alert and oriented. Thought content appropriate.  GCS: Motor: 6/Verbal: 5/Eyes: 4 GCS Total: 15  Mental Status: Awake, Alert, Oriented x 4  Language: No aphasia  Speech: No dysarthria  Cranial nerves: face symmetric, tongue midline, CN II-XII grossly intact.   Eyes: pupils equal, round, reactive to light with accommodation, EOMI. Dysconjugate gaze.  Pulmonary: normal respirations, no signs of respiratory distress  Skin: Skin is warm, dry and intact.  Sensory: intact to light touch throughout  Motor Strength: Moves all extremities spontaneously.     Strength   Deltoids Triceps Biceps Wrist Extension Wrist Flexion Hand    Upper: R 5/5 5/5 5/5 5/5 5/5 5/5     L 5/5 5/5 5/5 5/5 5/5 5/5       Iliopsoas Quadriceps Knee  Flexion Tibialis  anterior Gastro- cnemius EHL   Lower: R 3/5 3-/5 3/5 3/5 3/5 2/5     L 3/5 3-/5 3/5 3/5 3/5 3/5      BLE strength/ROM limited due to pain, body habitus and severe bilateral leg edema.     Thoracolumbar incision: C/D/I with staples. Skin edges well approximated. No surrounding erythema or edema. No drainage or TTP.      MIHIR drains x 4 intact to gravity with ss output.    Significant Labs:  Recent Labs   Lab 02/14/23  0340 02/14/23  1508 02/15/23  0421    GLU 93 81 113*   * 128* 128*   K 3.9 4.3 4.3    97 97   CO2 25 27 26   BUN 31* 33* 32*   CREATININE 0.5 0.5 0.6   CALCIUM 6.7* 7.7* 7.8*   MG 1.8  --  2.1     Recent Labs   Lab 02/14/23  0340 02/14/23  1508 02/15/23  0421   WBC 4.14 4.32 2.79*   HGB 6.9* 7.9* 8.2*   HCT 22.7* 25.7* 26.2*   * 105* 91*     No results for input(s): LABPT, INR, APTT in the last 48 hours.  Microbiology Results (last 7 days)       Procedure Component Value Units Date/Time    Culture, Anaerobic [[id number]] Collected: 02/10/23 1430    Order Status: Completed Specimen: Ascites Updated: 02/15/23 1034     Anaerobic Culture Culture in progress    Aerobic culture [[id number]]     Order Status: No result Specimen: Ascites     Culture, Anaerobic [[id number]]     Order Status: No result Specimen: Ascites     Gram stain [[id number]]     Order Status: No result Specimen: Ascites     Culture, Anaerobic [[id number]] Collected: 02/13/23 1529    Order Status: Completed Specimen: Ascites Updated: 02/15/23 0804     Anaerobic Culture Culture in progress    AFB Culture & Smear [[id number]] Collected: 02/13/23 1529    Order Status: Completed Specimen: Ascites Updated: 02/14/23 2127     AFB Culture & Smear Culture in progress     AFB CULTURE STAIN No acid fast bacilli seen.    Aerobic culture [[id number]] Collected: 02/10/23 1430    Order Status: Completed Specimen: Ascites Updated: 02/14/23 1036     Aerobic Bacterial Culture No growth    Fungus culture [[id number]] Collected: 02/13/23 1529    Order Status: Completed Specimen: Ascites Updated: 02/14/23 0936     Fungus (Mycology) Culture Culture in progress    Aerobic culture [[id number]] Collected: 02/13/23 1529    Order Status: Completed Specimen: Ascites Updated: 02/14/23 0902     Aerobic Bacterial Culture No growth    Gram stain [[id number]] Collected: 02/13/23 1529    Order Status: Completed Specimen: Ascites Updated: 02/13/23 9057     Gram Stain Result Rare WBC's      No organisms seen    AFB  Culture & Smear [[id number]]     Order Status: Completed Specimen: Ascites     Gram stain [[id number]] Collected: 02/10/23 1430    Order Status: Completed Specimen: Ascites Updated: 02/11/23 0333     Gram Stain Result No WBC's      No organisms seen          All pertinent labs from the last 24 hours have been reviewed.    Significant Diagnostics:  CT: CT Abdomen Pelvis With Contrast    Result Date: 2/15/2023  Findings consistent with cirrhosis.  Splenomegaly, Recanalized umbilical vein, scattered peritoneal fluid, and small nodular liver noted. Nonobstructing left renal stones. Postop change in the spine.  There is lucency about the bi iliac screws concerning for loosening.  Bony destruction T10 with interbody strut. Additional findings above. Electronically signed by: Celio Zuniga MD Date:    02/15/2023 Time:    07:51     I have reviewed and interpreted all pertinent imaging results/findings within the past 24 hours.    Assessment/Plan:     * Weakness of both lower extremities  Pt is 42F multiple spinal surgeries and revisions last T10- Pelvis in March 2022 who presented to OSH with concern for shoulder infection and UTI found to have E coli sepsis who has had progressive worsening BLE weakness. 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 Transferred to [facility] to  and neurosurgery was consulted.    OR 1/24 for temporary T6-12 PSIF. Taz pus noted intraoperatively.   OR 2/2 for T4-pelvis revision and extension of fusion with T9-10 corpectomy with plastic surgery assistance.  MAP goals > 85 completed 2/5 in ICU.    Plan:  --Stepped down to  floor.   -q4h neurochecks.  --All labs & diagnostics reviewed.   - CT A/P w/ contrast 2/14: scattered peritoneal fluid, findings consistent with cirrhosis, splenomegaly. No enhancing masses or fluid collections. All visualized hardware in good position with some expected haloing around bilateral iliac screws (seen on prior imaging).   -Post op xrays  pending. Try to obtain x-rays upright prior to discharge.  --TLSO to be worn when OOB only.  --MIHIR drains x4 to gravity, monitor output qshift. Per plastics - keep all drains until discharge -- management of drains per plastics. Following peripherally.  --Plastic surgery managing incision. Open to air.   - Off-load pressure as much as possible to promote wound healing.   - Elevate with wedge, alternate sides Q2H  --Thoracic discitis: BCx 1/20 no growth. OR cultures 1/24 grew ESBL E. coli   -- ID consulted. Recs for IV meropenem with estimated end date of 3/29/23.  --Pain Control: Continue multimodal regimen. PCA pump discontinued 2/8 with poor pain control subsequently.    - Anesthesia Pain Service consulted due to refractory pain, appreciate assistance. Continue current regimen.    - No NSAIDs as pt s/p recent instrumented fusion.  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 Do not suspect trauma to thoracic duct in relation to recent surgeries as etiology. Hepatology following. Other DDx could be decompensation of liver disease vs TB (AFB culture stain negative).   - Management per Hepatology recs: spironolactone, IV lasix, dietary restrictions; paracentesis PRN  --Acute blood loss anemia: Goal Hbg > 7. Transfuse PRN.  --DVT PPx: SQH/TEDs/SCDs.  --NSGY will continue to follow peripherally for wound checks and as needed. Please contact team with any further questions.        Shabnam Almendarez PA-C  Neurosurgery  [facility] - Telemetry Stepdown

## 2023-02-15 NOTE — PROGRESS NOTES
[facility] - Telemetry [facility] Medicine  Progress Note    Patient Name: Rachel Zazueta  MRN: [id number]  Patient Class: IP- Inpatient   Admission Date: 1/19/2023  Length of Stay: 27 days  Attending Physician: Vincent Bal MD  Primary Care Provider: Dannielle Merino DO        Subjective:     Principal Problem:Weakness of both lower extremities        HPI:  Ms. Zazueta is a 42 y.o F w/ hx ofIV drug use (methamphetamine), chronic HCV with cirrhosis, spinal fusion (04/2021), epidural abscess with removal of hardware (02/2022), spinal fusion with hardware (T10 - Pelvis / 03/2022), obesity (BMI 39.3) and neurogenic bladder who initially presented to [facility] for evaluation of back pain and left leg weakness.  She reports initially noting the left leg weakness when she was about to go to the bathroom and was about to fall but was assisted by her boyfriend.  She was brought to the ED via EMS.  Urinalysis with UTI concerns and blood cultures at OSH grew ESBL E coli so she was treated with meropenem.  During hospitalization, she reports the weakness that started out in her left leg initially then spread upwards to her left thigh, left hip, then crossed over to the right hip and spread to her right leg.  Denies recent IV drug use. She reports her last incidence of drug use was more than 3 years ago and also denies tobacco, and alcohol abuse.  Reports cannabis use.    She also reports more than 10 years ago she had an episode of a headache that lasted about 4 days and was associated with residual defects of a strabismus in the left eye with associated visual disturbances.  She also reports over the past few years her friends have noticed that she sometimes has word-finding difficulty during conversations.     Reports shortness of breath when sitting up, fever, chills, back pain, lower extremity weakness(initally L>R, but now R>L), diarrhea.  Denies cough, nausea, vomiting, constipation, bladder or  bowel incontinence, dysuria, dark urine, new vision changes.    OSH course notable for concerning urinalysis and blood cultures positive for ESBL E coli treated with meropenem.  She was also admit to the ICU where she was treated with Precedex for significant body aches, irritability, and muscle spasms.  Concerns of a murmur on physical exam so she underwent TTE which did not show any vegetations.  Worsening lower extremity weakness workup with MRI head nonspecific, MRI cervical spine with multilevel spondylosis, X-ray spine with multilevel thoracolumbar fusion and diskectomy, focal kyphosis at T9-10 increased compared to prior.  She was started on prophylaxis amoxicillin due to her history of infected hardware.  MRI spine unable to be completed due to patient's body habitus so she was transferred to [facility] for further imaging and Neurosurgery, Neurology evaluation.      Overview/Hospital Course:  Pt admitted as a transfer from [facility] for worsening LE weakness, concern for spinal infection, and need for MRI which could not be done at OSH. Imaging was completed here. Worsening proximal junctional kyphosis noted at T9-10 noted with concern for discitis at T8-9, T9-10. NSGY evaluated.     Found to have T8-T10 kyphosis on MRI. 1/24 underwent laminectomy T8-T10; posterior fusion T8-T12; and washout. 2/2 underwent T4-pelvis fusion and T9-T10 corpectomy.  ID consulted for thoracic discitis infection.  Patient to complete 8 weeks of therapy with meropenem. Patient underwent flap closure with Plastic surgery.  Step-down Hospital Medicine on 02/08.    2/9   On meropenem for MDR-ESCHERICHIA COLI ESBL  sodium trended down to 129.  Neurosurgery following post OR and aiding in pain management. PCA discontinued 2/8 . On oxycodone 10mg PO q4h . requiring IV dilaudid q4 as needed,. drains to gravity output 990ml/24h . Plastic surgery following flap, managing wounds.  Wound VAC discontinued on Wednesday.  accepted to [facility] pain  management consulted for back ache- switched to multimodal therapy. sodium trended down to 127.   2/10 sodium stable at 127 . continues with increased drain output 985ml/24h . negative balance of  2.8 L lasix on hold.  nephrology consulted. abdominal X ray -  Nonobstructive bowel gas pattern.  No free air.  As before, mild gaseous distension of stomach. ammonia at 57 . sono abdomen  . tylenol changed to 1000mg q12h with cirrhosis . sono abdomen -Cirrhotic liver morphology.  Sequela of portal hypertension including moderate ascites, recanalized umbilical vein.  No focal hepatic lesions. Cholecystectomy. . Nephrology recs  fluid restriction to 1 L,  lasix 60 mg IV daily,  and trend sodium q12h.  diazepam discontinued . s/p IR paracentesis today   2/11no evidence of SBP on paracentesis. 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 AFB and Triglyerides on ascitic fluid.  noncompliant with  fluid restriction to 1 L despite counseling. sodium trended down to 126 .received 2 doses of IV lasix 60mg. drain output 250ml. Increased Lasix IV 80 BID  due to UOP 500ml/24h   2/12 UOP of 1250ml/24h. K and P replaced. sodium trended up to 129.  hepatology eval -If ascitic fluid triglyceride level is elevated, needs IR  eval for lymphangiogram and surgery eval for  lymphatic repair. xanax 0.25mg BID PRN for anxiety   2/13 s/p paracentesis. Removed 4,700 ml. DVT sonogram LE negative. s/p psychiatry eval  for anxiety/depression. xanax discontinued. Decreased Bupropion to 150 mg PO Daily . Started Duloxetine 30 mg PO BID and  PRN Hydroxyzine 25 mg PO q8h for anxiety  2/14 Hb 6.9 Transfusion with 1 unit of PRBC . FOBT. sodium trended up to 130 . Diuresing well with lasix IV . DVT sonogram - Nonspecific fluid collection adjacent to the left iliac vein.  Recommend further evaluation with contrast enhanced CT scan . has Iodine contrast allergy- . 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  Diuresing well on IV lasix BID. negative balance of  13.6 L . IR eval -repeat paracentesis to trend volume output and  conservative management   with a low fat diet, octreotide, diuretics. Neurosurgery for input regarding chylous ascitis as possible neurosurgical complication or not. CT abdomen done overnight as well - Postop change in the spine.  There is lucency about the bi iliac screws concerning for loosening.  Bony destruction T10 with interbody strut. started on salt tablets 1g BID x 1day and lasix drip 10mg/h x 6h .  spironolactone  increased to 100 mg Qday. Continue IV Lasix. Dietary consulted for  high-protein and low-fat diet with medium-chain triglycerides  diet.          Review of Systems:   Pain scale: 6/10   Constitutional:  fever,  chills, headache, vision loss, hearing loss, weight loss, Generalized weakness, falls, loss of smell, loss of taste, poor appetite,  sore throat  Respiratory: cough, shortness of breath.   Cardiovascular: chest pain, dizziness, palpitations, orthopnea, swelling of feet, syncope  Gastrointestinal: nausea, vomiting, abdominal pain-improved  diarrhea, black stool,  blood in stool, change in bowel habits  Genitourinary: hematuria, dysuria, urgency, frequency  Integument/Breast: rash,  pruritis,  Surgical wound - back   Hematologic/Lymphatic: easy bruising, lymphadenopathy  Musculoskeletal: arthralgias , myalgias, back pain, neck pain, knee pain  Neurological: confusion, seizures, tremors, slurred speech  Behavioral/Psych:  depression, anxiety, auditory or visual hallucinations     OBJECTIVE:     Physical Exam:  Body mass index is 48.58 kg/m².    Constitutional: Appears well-developed and well-nourished. severe obesity  Head: Normocephalic and atraumatic.   Neck: Normal range of motion. Neck supple.   Cardiovascular:  Normal heart rate.  Regular heart rhythm.  Pulmonary/Chest: Effort normal.   Abdominal:+  distension.  No tenderness  Musculoskeletal: Normal range of motion. ++ edema. drains in place serosanguinous drainage  Neurological: Alert and oriented to person, place, and time. LE strength - 3-4/5   Skin: Skin is warm and dry.   Psychiatric: Normal mood and affect. Behavior is normal.                  Vital Signs  Temp: 97.8 °F (36.6 °C) (02/15/23 1113)  Pulse: (Abnormal) 116 (02/15/23 1130)  Resp: 19 (02/15/23 1113)  BP: (Abnormal) 105/56 (02/15/23 1113)  SpO2: 97 % (02/15/23 1113)     24 Hour VS Range    Temp:  [97.4 °F (36.3 °C)-98.5 °F (36.9 °C)]   Pulse:  []   Resp:  [12-20]   BP: ()/(55-72)   SpO2:  [96 %-99 %]   Arterial Line BP: (102)/(60)     Intake/Output Summary (Last 24 hours) at 2/15/2023 1347  Last data filed at 2/15/2023 1252  Gross per 24 hour   Intake 240 ml   Output 5450 ml   Net -5210 ml         I/O This Shift:  I/O this shift:  In: -   Out: 1075 [Urine:1075]    Wt Readings from Last 3 Encounters:   02/15/23 (Abnormal) 140.7 kg (310 lb 3 oz)   01/19/23 110.6 kg (243 lb 12.8 oz)   01/19/23 110.6 kg (243 lb 13.3 oz)       I have personally reviewed the vitals and recorded Intake/Output     Laboratory/Diagnostic Data:    CBC/Anemia Labs: Coags:    Recent Labs   Lab 02/14/23  0340 02/14/23  1117 02/14/23  1508 02/15/23  0421   WBC 4.14  --  4.32 2.79*   HGB 6.9*  --  7.9* 8.2*   HCT 22.7*  --  25.7* 26.2*   *  --  105* 91*   MCV 95  --  94 92   RDW 19.0*  --  18.7* 18.6*   OCCULTBLOOD  --  Negative  --   --     No results for input(s): PT, INR, APTT in the last 168 hours.       Chemistries: ABG:   Recent Labs   Lab 02/13/23  0500 02/13/23  2055 02/14/23  0340 02/14/23  1508 02/15/23  0421   *  128*   < > 130* 128* 128*   K 4.0  4.0   < > 3.9 4.3 4.3   CL 97  97   < > 102 97 97   CO2 23  26   < > 25 27 26   BUN 29*  29*   < > 31* 33* 32*   CREATININE 0.6  0.6   < > 0.5 0.5 0.6    CALCIUM 7.4*  7.6*   < > 6.7* 7.7* 7.8*   PROT 5.0*  --  4.3*  --  4.8*   BILITOT 1.2*  --  1.0  --  2.1*   ALKPHOS 148*  --  130  --  146*   ALT 26  --  25  --  29   AST 40  --  36  --  42*   MG 1.9  --  1.8  --  2.1   PHOS 2.9  3.0   < > 2.8 3.4 3.3    < > = values in this interval not displayed.    No results for input(s): PH, PCO2, PO2, HCO3, POCSATURATED, BE in the last 168 hours.       POCT Glucose: HbA1c:    Recent Labs   Lab 02/14/23  2219 02/15/23  1115   POCTGLUCOSE 98 128*    Hemoglobin A1C   Date Value Ref Range Status   04/16/2021 4.6 4.0 - 5.6 % Final     Comment:     ADA Screening Guidelines:  5.7-6.4%  Consistent with prediabetes  >or=6.5%  Consistent with diabetes    High levels of fetal hemoglobin interfere with the HbA1C  assay. Heterozygous hemoglobin variants (HbS, HgC, etc)do  not significantly interfere with this assay.   However, presence of multiple variants may affect accuracy.     05/14/2019 4.2 4.0 - 5.6 % Final     Comment:     ADA Screening Guidelines:  5.7-6.4%  Consistent with prediabetes  >or=6.5%  Consistent with diabetes  High levels of fetal hemoglobin interfere with the HbA1C  assay. Heterozygous hemoglobin variants (HbS, HgC, etc)do  not significantly interfere with this assay.   However, presence of multiple variants may affect accuracy.     11/24/2018 4.2 4.0 - 5.6 % Final     Comment:     ADA Screening Guidelines:  5.7-6.4%  Consistent with prediabetes  >or=6.5%  Consistent with diabetes  High levels of fetal hemoglobin interfere with the HbA1C  assay. Heterozygous hemoglobin variants (HbS, HgC, etc)do  not significantly interfere with this assay.   However, presence of multiple variants may affect accuracy.          Cardiac Enzymes: Ejection Fractions:    No results for input(s): CPK, CPKMB, MB, TROPONINI in the last 72 hours.   EF   Date Value Ref Range Status   01/20/2023 58 % Final   12/23/2022 64 % Final          No results for input(s): COLORU, APPEARANCEUA, PHUR,  SPECGRAV, PROTEINUA, GLUCUA, KETONESU, BILIRUBINUA, OCCULTUA, NITRITE, UROBILINOGEN, LEUKOCYTESUR, RBCUA, WBCUA, BACTERIA, SQUAMEPITHEL, HYALINECASTS in the last 48 hours.    Invalid input(s): WRIGHTSUR    Procalcitonin (ng/mL)   Date Value   02/14/2022 0.05   02/14/2022 0.06   04/14/2021 0.04   11/24/2018 0.37 (H)     Lactate (Lactic Acid) (mmol/L)   Date Value   02/14/2023 1.2   01/10/2023 2.0   12/23/2022 1.8   12/23/2022 3.6 (HH)   11/14/2022 1.4     BNP (pg/mL)   Date Value   11/24/2018 90   03/14/2017 87     CRP   Date Value   01/20/2023 9.4 mg/L (H)   01/10/2023 3.47 mg/dL (H)   11/16/2022 45.9 mg/L (H)   11/14/2022 11.10 mg/dL (H)   07/23/2022 1.93 mg/dL (H)   07/01/2022 1.53 mg/dL (H)   02/24/2022 7.1 mg/L   02/14/2022 29.2 mg/L (H)   02/14/2022 30.8 mg/L (H)   04/14/2021 47.5 mg/L (H)     Sed Rate (mm/Hr)   Date Value   01/20/2023 70 (H)   01/10/2023 55 (H)   11/16/2022 84 (H)   11/14/2022 67 (H)   02/24/2022 8   02/14/2022 57 (H)   02/14/2022 82 (H)   04/14/2021 57 (H)   05/13/2019 35 (H)   03/21/2017 21 (H)     No results found for: DDIMER  Ferritin (ng/mL)   Date Value   02/07/2022 126   03/21/2017 52   03/14/2017 67   12/06/2016 80     LD (U/L)   Date Value   04/24/2021 250   03/14/2017 253     Troponin I (ng/mL)   Date Value   02/08/2023 <0.006   11/24/2018 <0.006   03/20/2017 0.01   03/14/2017 <0.01     CPK (U/L)   Date Value   01/20/2023 48   02/14/2022 64   03/15/2017 44     No results found for this or any previous visit.  SARS-CoV2 (COVID-19) Qualitative PCR (no units)   Date Value   03/14/2022 Not Detected   02/20/2022 Not Detected   04/27/2021 Not Detected   04/22/2021 Not Detected     SARS-CoV-2 RNA, Amplification, Qual (no units)   Date Value   03/02/2022 Negative     POC Rapid COVID (no units)   Date Value   12/23/2022 Negative   12/10/2022 Negative   02/14/2022 Negative   04/14/2021 Negative       Microbiology labs for the last week  Microbiology Results (last 7 days)       Procedure  Component Value Units Date/Time    Culture, Anaerobic [[id number]] Collected: 02/10/23 1430    Order Status: Completed Specimen: Ascites Updated: 02/15/23 1034     Anaerobic Culture Culture in progress    Aerobic culture [[id number]]     Order Status: No result Specimen: Ascites     Culture, Anaerobic [[id number]]     Order Status: No result Specimen: Ascites     Gram stain [[id number]]     Order Status: No result Specimen: Ascites     Culture, Anaerobic [[id number]] Collected: 02/13/23 1529    Order Status: Completed Specimen: Ascites Updated: 02/15/23 0804     Anaerobic Culture Culture in progress    AFB Culture & Smear [[id number]] Collected: 02/13/23 1529    Order Status: Completed Specimen: Ascites Updated: 02/14/23 2127     AFB Culture & Smear Culture in progress     AFB CULTURE STAIN No acid fast bacilli seen.    Aerobic culture [[id number]] Collected: 02/10/23 1430    Order Status: Completed Specimen: Ascites Updated: 02/14/23 1036     Aerobic Bacterial Culture No growth    Fungus culture [[id number]] Collected: 02/13/23 1529    Order Status: Completed Specimen: Ascites Updated: 02/14/23 0936     Fungus (Mycology) Culture Culture in progress    Aerobic culture [[id number]] Collected: 02/13/23 1529    Order Status: Completed Specimen: Ascites Updated: 02/14/23 0902     Aerobic Bacterial Culture No growth    Gram stain [[id number]] Collected: 02/13/23 1529    Order Status: Completed Specimen: Ascites Updated: 02/13/23 1814     Gram Stain Result Rare WBC's      No organisms seen    AFB Culture & Smear [[id number]]     Order Status: Completed Specimen: Ascites     Gram stain [[id number]] Collected: 02/10/23 1430    Order Status: Completed Specimen: Ascites Updated: 02/11/23 0333     Gram Stain Result No WBC's      No organisms seen            Reviewed and noted in plan where applicable- Please see chart for full lab data.    Lines/Drains:  PICC Double Lumen 01/26/23 1209 left basilic (Active)   Line Necessity Review  Longterm central access required 02/08/23 1948   Verification by X-ray Yes 02/08/23 1948   Site Assessment No warmth;No swelling 02/08/23 1948   Extremity Assessment Distal to IV No abnormal discoloration 02/08/23 1948   Line Securement Device Secured with sutureless device 02/08/23 0730   Dressing Type Biopatch in place 02/08/23 1948   Dressing Status Clean;Dry;Intact 02/08/23 1948   Dressing Intervention Integrity maintained 02/08/23 1948   Date on Dressing 02/06/23 02/08/23 1948   Dressing Due to be Changed 02/13/23 02/08/23 1948   Distal Patency/Care flushed w/o difficulty 02/08/23 1948   Proximal 1 Patency/Care flushed w/o difficulty 02/08/23 1948   Current Insertion Depth (cm) 39 cm 01/26/23 1207   Current Exposed Catheter (cm) 0 cm 01/26/23 1207   Extremity Circumference (cm) 35 cm 01/26/23 1207   Waveform Not being transduced 02/08/23 0730   Number of days: 13            Midline Catheter Insertion/Assessment  - Single Lumen 01/12/23 2138 Right basilic vein (medial side of arm) 18g x 10cm (Active)   $ Midline Charges (Upon insertion) Bedside Insertion Performed Pt > 3 Years Old;Midline Catheter (Supply);Ultrasound Guide for Vascular Access 01/12/23 2142   Site Assessment Clean;Dry;Intact 02/08/23 1948   IV Device Securement catheter securement device 02/08/23 1948   Line Status Saline locked 02/08/23 1948   Dressing Type Biopatch in place 02/08/23 1948   Dressing Status Clean;Dry;Intact 02/08/23 1948   Dressing Intervention Integrity maintained 02/08/23 0730   Dressing Change Due 02/06/23 02/08/23 0730   Site Change Due 02/13/23 02/08/23 0730   Reason Not Rotated Not due 02/08/23 0730   Number of days: 27            Closed/Suction Drain 02/02/23 1401 Right Back Bulb 15 Fr. (Active)   Site Description Healing 02/08/23 1948   Dressing Type Transparent (Tegaderm) 02/08/23 1948   Dressing Status Clean;Dry;Intact 02/08/23 1948   Dressing Intervention Integrity maintained 02/08/23 1948   Drainage Serosanguineous  02/08/23 1948   Status Open to gravity drainage 02/08/23 1948   Output (mL) 10 mL 02/09/23 0530   Number of days: 6            Closed/Suction Drain 02/02/23 1402 Right Back Bulb 15 Fr. (Active)   Site Description Healing 02/08/23 1948   Dressing Type Transparent (Tegaderm) 02/08/23 1948   Dressing Status Clean;Dry;Intact 02/08/23 1948   Dressing Intervention Integrity maintained 02/08/23 1948   Drainage Serosanguineous 02/08/23 1948   Status Open to gravity drainage 02/08/23 1948   Output (mL) 30 mL 02/09/23 0530   Number of days: 6            Closed/Suction Drain 02/02/23 1402 Left Back Bulb 15 Fr. (Active)   Site Description Healing 02/08/23 1948   Dressing Type Transparent (Tegaderm) 02/08/23 1948   Dressing Status Clean;Dry;Intact 02/08/23 1948   Dressing Intervention Integrity maintained 02/08/23 1948   Drainage Serosanguineous 02/08/23 1948   Status Open to gravity drainage 02/08/23 1948   Output (mL) 15 mL 02/09/23 0530   Number of days: 6            Closed/Suction Drain 02/02/23 1402 Left Back Bulb 15 Fr. (Active)   Site Description Healing 02/08/23 1948   Dressing Type Transparent (Tegaderm) 02/08/23 1948   Dressing Status Clean;Dry;Intact 02/08/23 1948   Dressing Intervention Integrity maintained 02/08/23 1948   Drainage Serosanguineous 02/08/23 1948   Status Open to gravity drainage 02/08/23 1948   Output (mL) 10 mL 02/09/23 0530   Number of days: 6       Imaging      Results for orders placed during the hospital encounter of 01/19/23    Echo Saline Bubble? Yes    Interpretation Summary  · Study is negative for intercardiac shunt that is right to left ( see text).  · The left ventricle is normal in size with concentric remodeling and normal systolic function.  · The estimated ejection fraction is 58%.  · There are segmental left ventricular wall motion abnormalities.  · Normal left ventricular diastolic function.  · Normal right ventricular size with normal right ventricular systolic function.  · Mild  right atrial enlargement.  · Normal central venous pressure (3 mmHg).  · The estimated PA systolic pressure is 26 mmHg.      CT Abdomen Pelvis With Contrast  Narrative: EXAMINATION:  CT ABDOMEN PELVIS WITH CONTRAST    CLINICAL HISTORY:  Nonspecific fluid collection adjacent to the left iliac vein.  Recommend further evaluation with contrast enhanced CT.;    TECHNIQUE:  Low dose axial images, sagittal and coronal reformations were obtained from the lung bases to the pubic symphysis following the IV administration of 100 mL of Omnipaque 350 .  Oral contrast was not given.    COMPARISON:  This CT renal stone study April 2022 and ultrasound lower extremities February 13, 2023    FINDINGS:  In the chest, no significant pleural or pericardial fluid.  There may be trace fluid on the right.  Atelectasis left base.  No confluent consolidation.    In the abdomen, scattered peritoneal fluid.  Liver is small and nodular in contour.  Umbilical vein recanalized.  Gallbladder is been removed.  Pancreas appears unremarkable.  Spleen is enlarged.  SMV, splenic and portal veins are patent.  Majority of the portal venous flow is into the recanalized umbilical vein based on size.  No adrenal mass.  Few mm nonobstructing left renal stones.  No enhancing masses.    Aorta tapers normally.  No para-aortic nor pelvic adenopathy.  Enlarged left femoral and external iliac veins likely related to the collateral venous flow.    Uterus and adnexa unremarkable for age.  Bladder without significant wall thickening.    Evaluation of the bowel demonstrates scattered stool and bowel gas.  Scattered peritoneal fluid.  Appendix appears normal.    Subcutaneous tissues demonstrate significant anasarca.  Collateral vessels noted.  Presumed subcutaneous drains posteriorly.  Some fluid noted along the course of the drains.  Skin staples noted posteriorly.    Evaluation of the bones demonstrate postoperative changes extending from the visualized thoracic  spine to the bi iliac region.  There is some lucency about the bi iliac screws.  Osseous destruction T10 with interbody strut noted.  Impression: Findings consistent with cirrhosis.  Splenomegaly, Recanalized umbilical vein, scattered peritoneal fluid, and small nodular liver noted.    Nonobstructing left renal stones.    Postop change in the spine.  There is lucency about the bi iliac screws concerning for loosening.  Bony destruction T10 with interbody strut.    Additional findings above.    Electronically signed by: Celio Zuniga MD  Date:    02/15/2023  Time:    07:51      Labs, Imaging, EKG and Diagnostic results from 2/15/2023 were reviewed.    Medications:  Medication list was reviewed and changes noted under Assessment/Plan.  No current facility-administered medications on file prior to encounter.     Current Outpatient Medications on File Prior to Encounter   Medication Sig Dispense Refill    albuterol (ACCUNEB) 1.25 mg/3 mL Nebu Take 3 mLs (1.25 mg total) by nebulization every 6 (six) hours as needed (shortness of breath). Rescue 75 mL 0    buPROPion (WELLBUTRIN) 100 MG tablet Take 1 tablet (100 mg total) by mouth 2 (two) times daily. 60 tablet 2    folic acid (FOLVITE) 1 MG tablet Take 1 tablet (1 mg total) by mouth once daily. (Patient not taking: Reported on 11/25/2022) 42 tablet 0    gabapentin (NEURONTIN) 300 MG capsule Take 1 capsule (300 mg total) by mouth 3 (three) times daily. 90 capsule 11    lactulose (CHRONULAC) 20 gram/30 mL Soln Take 30 mLs (20 g total) by mouth 3 (three) times daily. 2700 mL 2    pantoprazole (PROTONIX) 40 MG tablet Take 1 tablet (40 mg total) by mouth once daily. 30 tablet 1    [DISCONTINUED] diclofenac sodium (VOLTAREN) 1 % Gel Apply 2 g topically 4 (four) times daily. 50 g 0     Scheduled Medications:  acetaminophen, 1,000 mg, Oral, Q12H  bisacodyL, 10 mg, Rectal, Every other day  buPROPion, 150 mg, Oral, Daily  dronabinoL, 2.5 mg, Oral, BID  DULoxetine, 30 mg, Oral,  BID  gabapentin, 900 mg, Oral, TID  heparin (porcine), 5,000 Units, Subcutaneous, Q8H  lactulose, 20 g, Oral, TID  LIDOcaine HCL 10 mg/ml (1%), , ,   meropenem (MERREM) IVPB, 2 g, Intravenous, Q8H  methocarbamoL, 1,000 mg, Oral, Q6H  pantoprazole, 40 mg, Oral, Daily  sodium chloride 0.9%, 10 mL, Intravenous, Q6H  sodium chloride, 1,000 mg, Oral, BID  [START ON 2/16/2023] spironolactone, 100 mg, Oral, Daily      PRN: sodium chloride, sodium chloride, sodium chloride, dextrose 10%, dextrose 10%, diphenhydrAMINE, glucagon (human recombinant), glucose, glucose, hydrALAZINE, HYDROmorphone, hydrOXYzine HCL, magnesium hydroxide 400 mg/5 ml, melatonin, naloxone, ondansetron, oxyCODONE **AND** oxyCODONE, polyethylene glycol, predniSONE, prochlorperazine, senna-docusate 8.6-50 mg, simethicone, sodium chloride 0.9%, Flushing PICC Protocol **AND** sodium chloride 0.9% **AND** sodium chloride 0.9%  Infusions:    furosemide (LASIX) 10 mg/mL infusion (non-titrating)       Estimated Creatinine Clearance: 179.7 mL/min (based on SCr of 0.6 mg/dL).    Assessment/Plan:      * Weakness of both lower extremities  42 year old woman s/p multiple spinal surgeries presented to OSH with possible infection of shoulder. Found to have UTI and E. Coli bacteremia and progressively worse BLE weakness. [place] [facility] [place]. Underwent Laminectomy T8-T10; Posterior spinal fusion T8-T12; hardware removal and washout on 1/24. Admitted to [facility] postop. On 2/2 underwent T4-pelvis fusion and T9-T10 corpectomies. To complete 8 week course of meropenem per ID for ESBL E coli from bone culture, end date 3/29.     - neuro checks q4h  - HV drains x 2 in place, management per NSGY  - meropenem for ESBL bacteremia per ID, eot 3/29/23  - Plastic surgery follow, wound vac discontinued  - CMP, Mag, Phos, CBC daily  - MAP goals >65, SBP < 180  - PRN labetalol, hydralazine  - MM pain regimen: PRN Dilaudid, Tylenol, gabapentin, robaxin, PRN  oxycodone  - Aggressive bowel regimen  - PT/OT   2/9   On meropenem for MDR-ESCHERICHIA COLI ESBL  sodium trended down to 129.  Neurosurgery following post OR and aiding in pain management. PCA discontinued 2/8 . On oxycodone 10mg PO q4h . requiring IV dilaudid q4 as needed,. drains to gravity output 990ml/24h . Plastic surgery following flap, managing wounds.  Wound VAC discontinued on Wednesday.  accepted to [facility]   2/11 needs post op xrays when able to stand or sit, prior to discharge  2/15  CT abdomen done overnight as well - Postop change in the spine.  There is lucency about the bi iliac screws concerning for loosening.  Bony destruction T10 with interbody strut. 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  IR consulted  for lymphangiogram and  eval for  lymphatic repair  2/15  IR eval -repeat paracentesis to trend volume output and  conservative management    spironolactone  increased to 100 mg Qday. Continue IV Lasix. Dietary consulted for  high-protein and low-fat diet with medium-chain triglycerides  diet.  t.        Ascites due to alcoholic cirrhosis  2/10  sono abdomen -Cirrhotic liver morphology.  Sequela of portal hypertension including moderate ascites, recanalized umbilical vein.  No focal hepatic lesions. Cholecystectomy. s/p IR paracentesis as above  2/11no evidence of SBP on paracentesis. 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 AFB and Triglyerides on ascitic fluid.    2/12  hepatology eval -If ascitic fluid triglyceride level is elevated, needs IR  eval for lymphangiogram and surgery eval for  lymphatic repair      Abdominal pain  2/10  abdominal X ray -  Nonobstructive bowel gas pattern.  No free air.  As before, mild gaseous distension of stomach. ammonia at 57 . sono abdomen with ascitis . simethicone PRN  2/11no evidence of SBP on paracentesis. hepatology consulted  for Ultrasound-guided paracentesis with drainage of 5000 mL of chylous fluid. AFB and Triglyerides on ascitic fluid.        Hyponatremia  - Patient with sodium   Recent Labs   Lab 02/14/23  0340 02/14/23  1508 02/15/23  0421   * 128* 128*   . sodium trending down    2/9 monitor on lasix BID.sodium trended down to 127.   2/10 sodium stable at 127 . continues with increased drain output 985ml/24h . negative balance of  2.8 L lasix on hold.  nephrology consulted.  Nephrology recs  fluid restriction to 1 L,  lasix 60 mg IV daily,  and trend sodium q12h.  diazepam discontinued   2/11  noncompliant with  fluid restriction to 1 L despite counseling. sodium trended down to 126 .received 2 doses of IV lasix 60mg. Increased Lasix IV 80 BID  due to UOP 500ml/24h   2/12 UOP of 1250ml/24h.sodium trended up to 129   2/14 sodium trended up to 130 . Diuresing well with lasix IV .   2/15 started on salt tablets 1g BID and lasix drip 10mg/h x 6h     Acute blood loss anemia  Hb 6.4 on 2/6, received 1U pRBCs. Hb 2/7 WNL.  - monitor Hb  - transfuse to maintain Hb >7  2/9    Patient's with Normocytic anemia.. Hemoglobin stable. Etiology likely due to chronic disease .  Current CBC reviewed-    Recent Labs   Lab 02/14/23  0340 02/14/23  1508 02/15/23  0421   HGB 6.9* 7.9* 8.2*    Monitor CBC and transfuse if H/H drops below 7/21.   2/14 Hb 6.9 Transfusion with 1 unit of PRBC . FOBT negative    Kyphosis of thoracolumbar region  See weakness    Thoracic discitis  See Weakness of both lower extremities    Anxiety and depression  Evaluated by Psychiatry at OSH prior to transfer. Recommended increasing bupropion.  - Continue bupropion 300 mg QD  - Gabapentin 600 mg TID for anxiety and neuropathy  - PRN diazepam 5 mg q6h discontinued  2/13   s/p psychiatry eval  for anxiety/depression. xanax discontinued. Decreased Bupropion to 150 mg PO Daily . Started Duloxetine 30 mg PO BID and  PRN Hydroxyzine 25 mg PO q8h for anxiety      Severe  obesity (BMI >= 40)  Body mass index is 48.51 kg/m². Morbid obesity complicates all aspects of disease management from diagnostic modalities to treatment. Weight loss encouraged        Substance use disorder  Denies IV drug use (meth) for past 3 years. Denies alcohol and tobacco abuse.     Chronic hepatitis C with cirrhosis  See above    Cirrhosis of liver with ascites  MELD-Na score: 11 at 2/8/2023  4:55 AM  MELD score: 11 at 2/8/2023  4:55 AM  Calculated from:  Serum Creatinine: 0.5 mg/dL (Using min of 1 mg/dL) at 2/8/2023  4:55 AM  Serum Sodium: 131 mmol/L at 2/8/2023  4:55 AM  Total Bilirubin: 1.6 mg/dL at 2/8/2023  4:55 AM  INR(ratio): 1.3 at 2/6/2023 10:47 AM  Age: 42 years      Patient has reportedly refused transplant evaluation in the past.   - Daily CMP and trend LFTs  - Continue Lactulose 20 g TID  - Lasix 40 mg PO BID  - Will need Hepatology follow up outpatient  2/10   ammonia at 57 . sono abdomen  . tylenol changed to 1000mg q12h with cirrhosis . s/p IR paracentesis today   2/11no evidence of SBP on paracentesis. 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 AFB and Triglyerides on ascitic fluid.    2/13 s/p paracentesis. Removed 4,700 ml. DVT sonogram LE negative.2/13 s/p paracentesis. Removed 4,700 ml. DVT sonogram LE negative. s/p psychiatry eval  for anxiety/depression. xanax discontinued. Decreased Bupropion to 150 mg PO Daily . Started Duloxetine 30 mg PO BID and  PRN Hydroxyzine 25 mg PO q8h for anxiety      VTE Risk Mitigation (From admission, onward)           Ordered     heparin (porcine) injection 5,000 Units  Every 8 hours         02/04/23 0848     IP VTE HIGH RISK PATIENT  Once         01/19/23 2153     Place sequential compression device  Until discontinued         01/19/23 2153     Reason for No Pharmacological VTE Prophylaxis  Once        Question:  Reasons:  Answer:  Physician Provided (leave comment)  Comment:  Potential NSGY  procedure    01/19/23 2153                    Discharge Planning   SHIRA: 2/17/2023     Code Status: Partial Code   Is the patient medically ready for discharge?: No    Reason for patient still in hospital (select all that apply): Treatment  Discharge Plan A: Long-term acute care facility (LTAC)   Discharge Delays: None known at this time              Vincent Bal MD  Department of Hospital Medicine   [facility] - Telemetry Stepdown

## 2023-02-15 NOTE — CONSULTS
IR Lymphangiogram Consult Note  Interventional Radiology    Consult Requested By: Vincent Bal MD   Reason for Consult: chyloaus ascitis/ needs IR  eval for lymphangiogram and   eval for  lymphatic repair     SUBJECTIVE:     Chief Complaint:  Weakness of both lower extremities     History of Present Illness:  Rachel Zazueta is a 42 y.o. female with a PMHx of polysubstance abuse, chronic HCV with cirrhosis, spinal fusion (04/2021), epidural abscess with removal of hardware (02/2022), spinal fusion with hardware (T10 - Pelvis / 03/2022), obesity, neurogenic bladder, and recent back surgery (2/2/23) who was admitted on 1/19/23 for b/l extremity weakness.     Interventional Radiology has been consulted for IR lymphangiogram for management of possible chyle leak. She is s/p temporary T6-12 PSIF on 1/24/23 and T4-pelvis revision and extension of fusion with T9-10 corpectomy with plastic surgery assistance on 2/2/23.    Hepatology was consulted during this admission who recommended triglyceride level from ascitic fluid from paracentesis, which was elevated at 380. Hepatology also recommended work up for alternate sources of chylous ascites if confirmed: pancreatitis, tuberculosis (ascites AFB smear ordered), RV failure or cardiomyopathy      Pt has had recent imaging including a CT AP on 2/15/23 which revealed a cirrhotic liver. The pt is hemodynamically stable.     Review of Systems   Constitutional: Negative.    Respiratory: Negative.     Cardiovascular: Negative.    Gastrointestinal: Negative.    Musculoskeletal: Negative.    Neurological:  Positive for weakness.   Psychiatric/Behavioral: Negative.       Scheduled Meds:   acetaminophen  1,000 mg Oral Q12H    bisacodyL  10 mg Rectal Every other day    buPROPion  150 mg Oral Daily    dronabinoL  2.5 mg Oral BID    DULoxetine  30 mg Oral BID    furosemide (LASIX) injection  80 mg Intravenous Q12H    gabapentin  900 mg Oral TID    heparin (porcine)  5,000 Units  Subcutaneous Q8H    lactulose  20 g Oral TID    meropenem (MERREM) IVPB  2 g Intravenous Q8H    methocarbamoL  1,000 mg Oral Q6H    pantoprazole  40 mg Oral Daily    sodium chloride 0.9%  10 mL Intravenous Q6H    spironolactone  25 mg Oral Daily     Continuous Infusions:  PRN Meds:sodium chloride, sodium chloride, sodium chloride, dextrose 10%, dextrose 10%, diphenhydrAMINE, glucagon (human recombinant), glucose, glucose, hydrALAZINE, HYDROmorphone, hydrOXYzine HCL, magnesium hydroxide 400 mg/5 ml, melatonin, naloxone, ondansetron, oxyCODONE **AND** oxyCODONE, polyethylene glycol, predniSONE, prochlorperazine, senna-docusate 8.6-50 mg, simethicone, sodium chloride 0.9%, Flushing PICC Protocol **AND** sodium chloride 0.9% **AND** sodium chloride 0.9%    Review of patient's allergies indicates:   Allergen Reactions    Bee venom protein (honey bee) Anaphylaxis     Patient reports she is allergic to bee stings.    Naproxen Anaphylaxis     Throat closing    12-23- Patient reports taking Ibuprofen 200 mg at home without problems. Verified X3. KS    Wasp sting [allergen ext-venom-honey bee] Anaphylaxis    Adhesive Blisters    Shellfish containing products     Iodine and iodide containing products Hives and Itching     Allergic to iodine in seafood only    Nuts [tree nut] Rash       Past Medical History:   Diagnosis Date    Anemia     Anxiety     Depressed     Diskitis     Hep C w/o coma, chronic     IV drug abuse     Kidney stone     Liver cirrhosis      Past Surgical History:   Procedure Laterality Date    ARTHROTOMY OF SHOULDER Left 11/15/2022    Procedure: ARTHROTOMY, SHOULDER;  Surgeon: Bjorn Hayes MD;  Location: [facility];  Service: Orthopedics;  Laterality: Left;  Left acromioclavicular joint arthrotomy    BACK SURGERY      benine tumor removal      forehead, age 9    CHOLECYSTECTOMY      KIDNEY SURGERY      LUMBAR FUSION Bilateral 3/2/2022    Procedure: FUSION, SPINE, LUMBAR;  Surgeon: Guille Templeton MD;  Location:  [facility] OR 2ND FLR;  Service: Neurosurgery;  Laterality: Bilateral;  AIRO  T10--Pelvis    LUMBAR FUSION N/A 1/24/2023    Procedure: **AIRO** T8-T12 POSTERIOR FUSION, T8-T10 LAMINECTOMY, HARDWARE REMOVAL AND WASHOUT;  Surgeon: Guille Templeton MD;  Location: [facility] OR 2ND FLR;  Service: Neurosurgery;  Laterality: N/A;    REMOVAL OF HARDWARE FROM SPINE N/A 2/21/2022    Procedure: REMOVAL, HARDWARE, SPINE;  Surgeon: Guille Templeton MD;  Location: [facility] OR 2ND FLR;  Service: Neurosurgery;  Laterality: N/A;  [place]    SPINE SURGERY      THORACIC LAMINECTOMY WITH FUSION N/A 2/2/2023    Procedure: LAMINECTOMY, SPINE, THORACIC, WITH FUSION (T4-Pelvis fusion w/ T9-10 corpectomies) **AIRO;  Surgeon: Guille Templeton MD;  Location: [facility] OR [facility] FLR;  Service: Neurosurgery;  Laterality: N/A;  AIRO, 2 bovies, aquamantys, Globus, Depuy, antibiotic beads, TXA, Peroxide; Babycos plastics closure     Family History   Problem Relation Age of Onset    Hepatitis Mother     Drug abuse Mother     Liver cancer Mother     Drug abuse Father     Cirrhosis Father     Hepatitis Father      Social History     Tobacco Use    Smoking status: Some Days     Packs/day: 0.50     Years: 23.00     Pack years: 11.50     Types: Cigarettes    Smokeless tobacco: Never    Tobacco comments:     patient states she knows she nees to quit.   Substance Use Topics    Alcohol use: Not Currently     Comment: quit 2014    Drug use: Yes     Frequency: 4.0 times per week     Types: Marijuana     Comment: Patient denies needle use, only inhalation of methampehtamines or orally.  Last known drug use was prior to admission to hospital stay for surgery       OBJECTIVE:     Vital Signs (Most Recent)  Temp: 97.6 °F (36.4 °C) (02/15/23 0813)  Pulse: (!) 112 (02/15/23 0813)  Resp: 17 (02/15/23 0813)  BP: (!) 146/65 (02/15/23 0813)  SpO2: 97 % (02/15/23 0813)    Physical Exam:  Physical Exam  Constitutional:       Appearance: Normal appearance. She is obese.   HENT:      Head:  Normocephalic.      Mouth/Throat:      Mouth: Mucous membranes are moist.   Cardiovascular:      Rate and Rhythm: Normal rate.   Pulmonary:      Effort: Pulmonary effort is normal.   Abdominal:      Comments: Obese. Lumbar drains in place with serosanguineous drainage noted   Musculoskeletal:         General: Normal range of motion.   Skin:     General: Skin is warm.   Neurological:      General: No focal deficit present.      Mental Status: She is alert.   Psychiatric:         Mood and Affect: Mood normal.       Laboratory  I have reviewed all pertinent lab results within the past 24 hours.  CBC:   Recent Labs   Lab 02/15/23  0421   WBC 2.79*   RBC 2.85*   HGB 8.2*   HCT 26.2*   PLT 91*   MCV 92   MCH 28.8   MCHC 31.3*     BMP:   Recent Labs   Lab 02/15/23  0421   *   *   K 4.3   CL 97   CO2 26   BUN 32*   CREATININE 0.6   CALCIUM 7.8*   MG 2.1     CMP:   Recent Labs   Lab 02/15/23  0421   *   CALCIUM 7.8*   ALBUMIN 1.8*   PROT 4.8*   *   K 4.3   CO2 26   CL 97   BUN 32*   CREATININE 0.6   ALKPHOS 146*   ALT 29   AST 42*   BILITOT 2.1*     LFTs:   Recent Labs   Lab 02/15/23  0421   ALT 29   AST 42*   ALKPHOS 146*   BILITOT 2.1*   PROT 4.8*   ALBUMIN 1.8*     Coagulation: No results for input(s): LABPROT, INR, APTT in the last 168 hours.      Imaging:    Narrative & Impression  EXAMINATION:  CT ABDOMEN PELVIS WITH CONTRAST     CLINICAL HISTORY:  Nonspecific fluid collection adjacent to the left iliac vein.  Recommend further evaluation with contrast enhanced CT.;     TECHNIQUE:  Low dose axial images, sagittal and coronal reformations were obtained from the lung bases to the pubic symphysis following the IV administration of 100 mL of Omnipaque 350 .  Oral contrast was not given.     COMPARISON:  This CT renal stone study April 2022 and ultrasound lower extremities February 13, 2023     FINDINGS:  In the chest, no significant pleural or pericardial fluid.  There may be trace fluid on the  right.  Atelectasis left base.  No confluent consolidation.     In the abdomen, scattered peritoneal fluid.  Liver is small and nodular in contour.  Umbilical vein recanalized.  Gallbladder is been removed.  Pancreas appears unremarkable.  Spleen is enlarged.  SMV, splenic and portal veins are patent.  Majority of the portal venous flow is into the recanalized umbilical vein based on size.  No adrenal mass.  Few mm nonobstructing left renal stones.  No enhancing masses.     Aorta tapers normally.  No para-aortic nor pelvic adenopathy.  Enlarged left femoral and external iliac veins likely related to the collateral venous flow.     Uterus and adnexa unremarkable for age.  Bladder without significant wall thickening.     Evaluation of the bowel demonstrates scattered stool and bowel gas.  Scattered peritoneal fluid.  Appendix appears normal.     Subcutaneous tissues demonstrate significant anasarca.  Collateral vessels noted.  Presumed subcutaneous drains posteriorly.  Some fluid noted along the course of the drains.  Skin staples noted posteriorly.     Evaluation of the bones demonstrate postoperative changes extending from the visualized thoracic spine to the bi iliac region.  There is some lucency about the bi iliac screws.  Osseous destruction T10 with interbody strut noted.     Impression:     Findings consistent with cirrhosis.  Splenomegaly, Recanalized umbilical vein, scattered peritoneal fluid, and small nodular liver noted.     Nonobstructing left renal stones.     Postop change in the spine.  There is lucency about the bi iliac screws concerning for loosening.  Bony destruction T10 with interbody strut.     Additional findings above.        Electronically signed by: Celio Zuniga MD  Date:                                            02/15/2023  Time:                                           07:51      ASSESSMENT/PLAN:     Assessment:  42 y.o. female with a PMHx of polysubstance abuse, chronic HCV with  cirrhosis, spinal fusion (04/2021), epidural abscess with removal of hardware (02/2022), spinal fusion with hardware (T10 - Pelvis / 03/2022), obesity, neurogenic bladder, and recent back surgery (2/2/23) who has been referred to IR for lymphatic leak embolization for management of possible chyle leak. The procedure was discussed in detail with the patient including thorough explanations of the potential risks and benefits of lymphatic leak embolization. Risks include sepsis, severe infection, hemorrhage, damage to the artery, damage to surrounding organs, pseudoaneurysm, non target embolization, contrast allergy and death. The patient is not a candidate for lymphangiogram/ lymphatic leak embolization. Plan discussed with ordering physician.The pt verbalized understanding of the plan     Plan:  Will not proceed with lymphangiogram at this time   Appreciate hepatology consult and recs  Recommend repeat paracentesis with repeat triglycerides level, in addition to conservative measures such as low fat diet, octreotide, diuretics, etc.   Thank you for the consult. IR will sign off. Please contact with questions via [facility] [employer] secure chat or spectra link    Damaris Neal PA-C  Interventional Radiology  2/15/2023  Spectra: [phone]

## 2023-02-15 NOTE — SUBJECTIVE & OBJECTIVE
Interval History: no acute events overnight. Serum sodium decreased to 128  Intake not being recorded    Review of patient's allergies indicates:   Allergen Reactions    Bee venom protein (honey bee) Anaphylaxis     Patient reports she is allergic to bee stings.    Naproxen Anaphylaxis     Throat closing    12-23- Patient reports taking Ibuprofen 200 mg at home without problems. Verified X3. KS    Wasp sting [allergen ext-venom-honey bee] Anaphylaxis    Adhesive Blisters    Shellfish containing products     Iodine and iodide containing products Hives and Itching     Allergic to iodine in seafood only    Nuts [tree nut] Rash     Current Facility-Administered Medications   Medication Frequency    0.9%  NaCl infusion (for blood administration) Q24H PRN    0.9%  NaCl infusion (for blood administration) Q24H PRN    0.9%  NaCl infusion (for blood administration) Q24H PRN    acetaminophen tablet 1,000 mg Q12H    bisacodyL suppository 10 mg Every other day    buPROPion TB24 tablet 150 mg Daily    dextrose 10% bolus 125 mL 125 mL PRN    dextrose 10% bolus 250 mL 250 mL PRN    diphenhydrAMINE capsule 25 mg On Call Procedure    dronabinoL capsule 2.5 mg BID    DULoxetine DR capsule 30 mg BID    furosemide (LASIX) 500 mg in 50 mL infusion (conc: 10 mg/mL) Continuous    gabapentin capsule 900 mg TID    glucagon (human recombinant) injection 1 mg PRN    glucose chewable tablet 16 g PRN    glucose chewable tablet 24 g PRN    heparin (porcine) injection 5,000 Units Q8H    hydrALAZINE tablet 25 mg Q8H PRN    HYDROmorphone injection 0.5 mg Q4H PRN    hydrOXYzine HCL tablet 25 mg TID PRN    lactulose 20 gram/30 mL solution Soln 20 g TID    LIDOcaine HCL 10 mg/ml (1%) 10 mg/mL (1 %) injection     magnesium hydroxide 400 mg/5 ml suspension 2,400 mg Daily PRN    melatonin tablet 6 mg Nightly PRN    meropenem (MERREM) 2 g in sodium chloride 0.9% 100 mL IVPB Q8H    methocarbamoL tablet 1,000 mg Q6H    naloxone 0.4 mg/mL injection 0.02 mg  PRN    ondansetron injection 4 mg Q6H PRN    oxyCODONE immediate release tablet 5 mg Q3H PRN    And    oxyCODONE immediate release tablet Tab 10 mg Q3H PRN    pantoprazole EC tablet 40 mg Daily    polyethylene glycol packet 17 g Daily PRN    predniSONE tablet 50 mg On Call Procedure    prochlorperazine injection Soln 2.5 mg Q6H PRN    senna-docusate 8.6-50 mg per tablet 1 tablet BID PRN    simethicone chewable tablet 80 mg TID PRN    sodium chloride 0.9% flush 10 mL Q12H PRN    sodium chloride 0.9% flush 10 mL Q6H    And    sodium chloride 0.9% flush 10 mL PRN    sodium chloride tablet tbso 1,000 mg BID    spironolactone tablet 25 mg Daily       Objective:     Vital Signs (Most Recent):  Temp: 97.8 °F (36.6 °C) (02/15/23 1113)  Pulse: (!) 116 (02/15/23 1130)  Resp: 19 (02/15/23 1113)  BP: (!) 105/56 (02/15/23 1113)  SpO2: 97 % (02/15/23 1113)   Vital Signs (24h Range):  Temp:  [97.4 °F (36.3 °C)-98.5 °F (36.9 °C)] 97.8 °F (36.6 °C)  Pulse:  [] 116  Resp:  [12-20] 19  SpO2:  [96 %-99 %] 97 %  BP: ()/(55-72) 105/56  Arterial Line BP: (102)/(60) 102/60     Weight: (!) 140.7 kg (310 lb 3 oz) (02/15/23 0436)  Body mass index is 48.58 kg/m².  Body surface area is 2.58 meters squared.    I/O last 3 completed shifts:  In: 1000 [P.O.:1000]  Out: 7165 [Urine:6425; Drains:740]    Physical Exam  Constitutional:       General: She is not in acute distress.     Appearance: Normal appearance. She is obese. She is not ill-appearing.   HENT:      Head: Normocephalic and atraumatic.   Eyes:      General: No scleral icterus.  Cardiovascular:      Rate and Rhythm: Normal rate.      Pulses: Normal pulses.   Pulmonary:      Effort: Pulmonary effort is normal.   Abdominal:      General: There is distension.      Tenderness: There is abdominal tenderness. There is no guarding.   Musculoskeletal:      Right lower leg: Edema present.      Left lower leg: Edema present.   Skin:     General: Skin is warm and dry.      Coloration:  Skin is not jaundiced.   Neurological:      Mental Status: She is alert and oriented to person, place, and time.   Psychiatric:         Behavior: Behavior normal.       Significant Labs:  All labs within the past 24 hours have been reviewed.     Significant Imaging:  Labs: Reviewed

## 2023-02-16 PROBLEM — E87.6 HYPOKALEMIA: Status: ACTIVE | Noted: 2023-02-16

## 2023-02-16 LAB
ADENOSINE DEAMINASE PRT-CCNC: <1 U/L (ref 0–30)
ALBUMIN SERPL BCP-MCNC: 2 G/DL (ref 3.5–5.2)
ALBUMIN SERPL BCP-MCNC: 2.1 G/DL (ref 3.5–5.2)
ALP SERPL-CCNC: 136 U/L (ref 55–135)
ALP SERPL-CCNC: 142 U/L (ref 55–135)
ALT SERPL W/O P-5'-P-CCNC: 30 U/L (ref 10–44)
ALT SERPL W/O P-5'-P-CCNC: 30 U/L (ref 10–44)
AMMONIA PLAS-SCNC: 42 UMOL/L (ref 10–50)
ANION GAP SERPL CALC-SCNC: 6 MMOL/L (ref 8–16)
ANION GAP SERPL CALC-SCNC: 7 MMOL/L (ref 8–16)
AST SERPL-CCNC: 46 U/L (ref 10–40)
AST SERPL-CCNC: 48 U/L (ref 10–40)
BACTERIA SPEC AEROBE CULT: NO GROWTH
BASOPHILS # BLD AUTO: 0.03 K/UL (ref 0–0.2)
BASOPHILS NFR BLD: 1.2 % (ref 0–1.9)
BILIRUB SERPL-MCNC: 1.3 MG/DL (ref 0.1–1)
BILIRUB SERPL-MCNC: 1.3 MG/DL (ref 0.1–1)
BUN SERPL-MCNC: 30 MG/DL (ref 6–20)
BUN SERPL-MCNC: 31 MG/DL (ref 6–20)
CALCIUM SERPL-MCNC: 7.5 MG/DL (ref 8.7–10.5)
CALCIUM SERPL-MCNC: 7.7 MG/DL (ref 8.7–10.5)
CHLORIDE SERPL-SCNC: 97 MMOL/L (ref 95–110)
CHLORIDE SERPL-SCNC: 97 MMOL/L (ref 95–110)
CO2 SERPL-SCNC: 27 MMOL/L (ref 23–29)
CO2 SERPL-SCNC: 28 MMOL/L (ref 23–29)
CREAT SERPL-MCNC: 0.6 MG/DL (ref 0.5–1.4)
CREAT SERPL-MCNC: 0.6 MG/DL (ref 0.5–1.4)
DIFFERENTIAL METHOD: ABNORMAL
EOSINOPHIL # BLD AUTO: 0.1 K/UL (ref 0–0.5)
EOSINOPHIL NFR BLD: 2.9 % (ref 0–8)
ERYTHROCYTE [DISTWIDTH] IN BLOOD BY AUTOMATED COUNT: 18.6 % (ref 11.5–14.5)
EST. GFR  (NO RACE VARIABLE): >60 ML/MIN/1.73 M^2
EST. GFR  (NO RACE VARIABLE): >60 ML/MIN/1.73 M^2
GLUCOSE SERPL-MCNC: 84 MG/DL (ref 70–110)
GLUCOSE SERPL-MCNC: 90 MG/DL (ref 70–110)
HCT VFR BLD AUTO: 26.5 % (ref 37–48.5)
HGB BLD-MCNC: 8.2 G/DL (ref 12–16)
IMM GRANULOCYTES # BLD AUTO: 0.02 K/UL (ref 0–0.04)
IMM GRANULOCYTES NFR BLD AUTO: 0.8 % (ref 0–0.5)
LYMPHOCYTES # BLD AUTO: 0.7 K/UL (ref 1–4.8)
LYMPHOCYTES NFR BLD: 27.9 % (ref 18–48)
MAGNESIUM SERPL-MCNC: 1.8 MG/DL (ref 1.6–2.6)
MCH RBC QN AUTO: 28.6 PG (ref 27–31)
MCHC RBC AUTO-ENTMCNC: 30.9 G/DL (ref 32–36)
MCV RBC AUTO: 92 FL (ref 82–98)
MONOCYTES # BLD AUTO: 0.5 K/UL (ref 0.3–1)
MONOCYTES NFR BLD: 20.1 % (ref 4–15)
NEUTROPHILS # BLD AUTO: 1.2 K/UL (ref 1.8–7.7)
NEUTROPHILS NFR BLD: 47.1 % (ref 38–73)
NRBC BLD-RTO: 0 /100 WBC
PHOSPHATE SERPL-MCNC: 3.4 MG/DL (ref 2.7–4.5)
PLATELET # BLD AUTO: 82 K/UL (ref 150–450)
PMV BLD AUTO: 8.4 FL (ref 9.2–12.9)
POCT GLUCOSE: 113 MG/DL (ref 70–110)
POTASSIUM SERPL-SCNC: 3.2 MMOL/L (ref 3.5–5.1)
POTASSIUM SERPL-SCNC: 3.4 MMOL/L (ref 3.5–5.1)
PROT SERPL-MCNC: 5 G/DL (ref 6–8.4)
PROT SERPL-MCNC: 5.2 G/DL (ref 6–8.4)
RBC # BLD AUTO: 2.87 M/UL (ref 4–5.4)
SODIUM SERPL-SCNC: 131 MMOL/L (ref 136–145)
SODIUM SERPL-SCNC: 131 MMOL/L (ref 136–145)
WBC # BLD AUTO: 2.44 K/UL (ref 3.9–12.7)

## 2023-02-16 PROCEDURE — 20600001 HC STEP DOWN PRIVATE ROOM

## 2023-02-16 PROCEDURE — 63600175 PHARM REV CODE 636 W HCPCS: Performed by: NURSE PRACTITIONER

## 2023-02-16 PROCEDURE — 82140 ASSAY OF AMMONIA: CPT | Performed by: PHYSICIAN ASSISTANT

## 2023-02-16 PROCEDURE — 80053 COMPREHEN METABOLIC PANEL: CPT | Performed by: STUDENT IN AN ORGANIZED HEALTH CARE EDUCATION/TRAINING PROGRAM

## 2023-02-16 PROCEDURE — 25000003 PHARM REV CODE 250: Performed by: STUDENT IN AN ORGANIZED HEALTH CARE EDUCATION/TRAINING PROGRAM

## 2023-02-16 PROCEDURE — 27000207 HC ISOLATION

## 2023-02-16 PROCEDURE — 80053 COMPREHEN METABOLIC PANEL: CPT | Mod: 91 | Performed by: PHYSICIAN ASSISTANT

## 2023-02-16 PROCEDURE — 25000003 PHARM REV CODE 250: Performed by: HOSPITALIST

## 2023-02-16 PROCEDURE — 25000003 PHARM REV CODE 250: Performed by: PHYSICIAN ASSISTANT

## 2023-02-16 PROCEDURE — 84100 ASSAY OF PHOSPHORUS: CPT | Performed by: STUDENT IN AN ORGANIZED HEALTH CARE EDUCATION/TRAINING PROGRAM

## 2023-02-16 PROCEDURE — 63600175 PHARM REV CODE 636 W HCPCS: Performed by: PHYSICIAN ASSISTANT

## 2023-02-16 PROCEDURE — 25000003 PHARM REV CODE 250

## 2023-02-16 PROCEDURE — 99233 SBSQ HOSP IP/OBS HIGH 50: CPT | Mod: ,,, | Performed by: INTERNAL MEDICINE

## 2023-02-16 PROCEDURE — 63600175 PHARM REV CODE 636 W HCPCS: Performed by: STUDENT IN AN ORGANIZED HEALTH CARE EDUCATION/TRAINING PROGRAM

## 2023-02-16 PROCEDURE — 99233 SBSQ HOSP IP/OBS HIGH 50: CPT | Mod: ,,, | Performed by: PHYSICIAN ASSISTANT

## 2023-02-16 PROCEDURE — 99233 PR SUBSEQUENT HOSPITAL CARE,LEVL III: ICD-10-PCS | Mod: ,,, | Performed by: PHYSICIAN ASSISTANT

## 2023-02-16 PROCEDURE — 99233 PR SUBSEQUENT HOSPITAL CARE,LEVL III: ICD-10-PCS | Mod: ,,, | Performed by: INTERNAL MEDICINE

## 2023-02-16 PROCEDURE — 83735 ASSAY OF MAGNESIUM: CPT | Performed by: STUDENT IN AN ORGANIZED HEALTH CARE EDUCATION/TRAINING PROGRAM

## 2023-02-16 PROCEDURE — 63600175 PHARM REV CODE 636 W HCPCS

## 2023-02-16 PROCEDURE — 25000003 PHARM REV CODE 250: Performed by: INTERNAL MEDICINE

## 2023-02-16 PROCEDURE — 85025 COMPLETE CBC W/AUTO DIFF WBC: CPT

## 2023-02-16 RX ORDER — POTASSIUM CHLORIDE 20 MEQ/1
40 TABLET, EXTENDED RELEASE ORAL
Status: COMPLETED | OUTPATIENT
Start: 2023-02-16 | End: 2023-02-16

## 2023-02-16 RX ORDER — FUROSEMIDE 10 MG/ML
40 INJECTION INTRAMUSCULAR; INTRAVENOUS ONCE
Status: COMPLETED | OUTPATIENT
Start: 2023-02-16 | End: 2023-02-16

## 2023-02-16 RX ADMIN — METHOCARBAMOL 1000 MG: 500 TABLET ORAL at 05:02

## 2023-02-16 RX ADMIN — FUROSEMIDE 40 MG: 10 INJECTION, SOLUTION INTRAMUSCULAR; INTRAVENOUS at 03:02

## 2023-02-16 RX ADMIN — MEROPENEM 2 G: 1 INJECTION INTRAVENOUS at 05:02

## 2023-02-16 RX ADMIN — SODIUM CHLORIDE 1000 MG: 1 TABLET ORAL at 09:02

## 2023-02-16 RX ADMIN — ACETAMINOPHEN 1000 MG: 500 TABLET ORAL at 09:02

## 2023-02-16 RX ADMIN — GABAPENTIN 900 MG: 300 CAPSULE ORAL at 09:02

## 2023-02-16 RX ADMIN — LACTULOSE 20 G: 20 SOLUTION ORAL at 09:02

## 2023-02-16 RX ADMIN — MEROPENEM 2 G: 1 INJECTION INTRAVENOUS at 09:02

## 2023-02-16 RX ADMIN — OXYCODONE HYDROCHLORIDE 10 MG: 10 TABLET ORAL at 02:02

## 2023-02-16 RX ADMIN — GABAPENTIN 900 MG: 300 CAPSULE ORAL at 03:02

## 2023-02-16 RX ADMIN — HEPARIN SODIUM 5000 UNITS: 5000 INJECTION INTRAVENOUS; SUBCUTANEOUS at 03:02

## 2023-02-16 RX ADMIN — DULOXETINE 30 MG: 30 CAPSULE, DELAYED RELEASE ORAL at 09:02

## 2023-02-16 RX ADMIN — Medication 6 MG: at 09:02

## 2023-02-16 RX ADMIN — FUROSEMIDE 10 MG/HR: 10 INJECTION, SOLUTION INTRAMUSCULAR; INTRAVENOUS at 03:02

## 2023-02-16 RX ADMIN — HEPARIN SODIUM 5000 UNITS: 5000 INJECTION INTRAVENOUS; SUBCUTANEOUS at 09:02

## 2023-02-16 RX ADMIN — BUPROPION HYDROCHLORIDE 150 MG: 150 TABLET, FILM COATED, EXTENDED RELEASE ORAL at 09:02

## 2023-02-16 RX ADMIN — METHOCARBAMOL 1000 MG: 500 TABLET ORAL at 12:02

## 2023-02-16 RX ADMIN — DRONABINOL 2.5 MG: 2.5 CAPSULE ORAL at 09:02

## 2023-02-16 RX ADMIN — PANTOPRAZOLE SODIUM 40 MG: 40 TABLET, DELAYED RELEASE ORAL at 09:02

## 2023-02-16 RX ADMIN — POTASSIUM CHLORIDE 40 MEQ: 1500 TABLET, EXTENDED RELEASE ORAL at 05:02

## 2023-02-16 RX ADMIN — HEPARIN SODIUM 5000 UNITS: 5000 INJECTION INTRAVENOUS; SUBCUTANEOUS at 05:02

## 2023-02-16 RX ADMIN — OXYCODONE HYDROCHLORIDE 10 MG: 10 TABLET ORAL at 09:02

## 2023-02-16 RX ADMIN — POTASSIUM CHLORIDE 40 MEQ: 1500 TABLET, EXTENDED RELEASE ORAL at 03:02

## 2023-02-16 RX ADMIN — SPIRONOLACTONE 100 MG: 100 TABLET, FILM COATED ORAL at 09:02

## 2023-02-16 RX ADMIN — MEROPENEM 2 G: 1 INJECTION INTRAVENOUS at 02:02

## 2023-02-16 NOTE — ASSESSMENT & PLAN NOTE
42 y.o F w/ hx of IV drug use (meth), chronic HCV with cirrhosis, spinal fusion (04/2021), epidural abscess with removal of hardware (02/2022), spinal fusion with hardware (T10 - Pelvis / 03/2022), and neurogenic bladder here with complicated ESBL E.coli epidural abscess s/p washout,corpectomy, fixation being treated w/ meropenem.     Nephrology consulted for hyponatremia. Patient reports uncomfortable abdominal distension and leg swelling. Reports drinking 3-4 cups of her 30oz water tumbler daily. Urine Na <10, urine osm 513. Serum osm 283. Serum Na stable. Normal mentation.    Hyponatremia likely 2/2 to low effective circulating volume 2/2 cirrhosis. Possible poor solute intake relative to free water intake. Unlikely SIADH given low urine Na.   Repeat urine studies 2/14 suggestive of appropriate ADH in setting of cirrhosis    - Fluid restrict to 1 L daily to avoid worsening of hyponatremia. Patient currently not compliant w/ fluid restriction despite counseling. Discussed with patient today  - Volume status remains an issue, please obtain a BMP now and if sodium > 128 start lasix IV push 40 followed by 10 lasix drip for 6 hours  - Asymptomatic, no role for hypertonic saline  - check afternoon BMP

## 2023-02-16 NOTE — PROGRESS NOTES
"[facility] - Telemetry Stepdown  Nephrology  Progress Note    Patient Name: Rachel Zazueta  MRN: [id number]  Admission Date: 1/19/2023  Hospital Length of Stay: 28 days  Attending Provider: Noni Vela MD   Primary Care Physician: Dannielle Merino DO  Principal Problem:Weakness of both lower extremities    Subjective:     HPI: Per HM note: "42 y.o F w/ hx ofIV drug use (methamphetamine), chronic HCV with cirrhosis, spinal fusion (04/2021), epidural abscess with removal of hardware (02/2022), spinal fusion with hardware (T10 - Pelvis / 03/2022), obesity (BMI 39.3) and neurogenic bladder and recent shoulder surgery who initially presented to [facility] for evaluation of back pain and left leg weakness. She reports initially noting the left leg weakness when she was about to go to the bathroom and was about to fall but was assisted by her boyfriend.  She was brought to the ED via EMS. Urinalysis with UTI concerns and blood cultures at OSH grew ESBL E coli, and shoulder effusion concern for infection so she was treated with meropenem.  During hospitalization, she reports the weakness that started out in her left leg initially then spread upwards to her left thigh, left hip, then crossed over to the right hip and spread to her right leg.  Denies recent IV drug use. She reports her last incidence of drug use was more than 3 years ago and also denies tobacco, and alcohol abuse.  Reports cannabis use.     OSH course notable for concerning urinalysis and blood cultures positive for ESBL E coli treated with meropenem.  She was also admit to the ICU where she was treated with Precedex for significant body aches, irritability, and muscle spasms.  Concerns of a murmur on physical exam so she underwent TTE which did not show any vegetations.  Worsening lower extremity weakness workup with MRI head nonspecific, MRI cervical spine with multilevel spondylosis, X-ray spine with multilevel thoracolumbar fusion and " "diskectomy, focal kyphosis at T9-10 increased compared to prior.  She was started on prophylaxis amoxicillin due to her history of infected hardware.  MRI spine unable to be completed due to patient's body habitus so she was transferred to [facility] [place] [facility] for further imaging and Neurosurgery, Neurology evaluation." Underwent laminectomy, posterior fusion and washout on 1/24. ID following for discitis, has her on meropenem. Stepped down to  on 2/8.     Nephrology consulted for hyponatremia. Patient reports uncomfortable abdominal distension and leg swelling. Reports drinking 3-4 cups of her 30oz water tumbler daily. Urine Na <10, urine osm 513. Serum Na trend 136--> 132-->131-->129-->127-->135-->134-->127. Normal mentation. Cr/BUN wnl. Albumin 1.8, protein 4.5, bili 1.4.          Interval History: status post paracentesis yesterday. Serum sodium 131    Review of patient's allergies indicates:   Allergen Reactions    Bee venom protein (honey bee) Anaphylaxis     Patient reports she is allergic to bee stings.    Naproxen Anaphylaxis     Throat closing    12-23- Patient reports taking Ibuprofen 200 mg at home without problems. Verified X3. [place]    Wasp sting [allergen ext-venom-honey bee] Anaphylaxis    Adhesive Blisters    Shellfish containing products     Iodine and iodide containing products Hives and Itching     Allergic to iodine in seafood only    Nuts [tree nut] Rash     Current Facility-Administered Medications   Medication Frequency    0.9%  NaCl infusion (for blood administration) Q24H PRN    0.9%  NaCl infusion (for blood administration) Q24H PRN    0.9%  NaCl infusion (for blood administration) Q24H PRN    acetaminophen tablet 1,000 mg Q12H    bisacodyL suppository 10 mg Every other day    buPROPion TB24 tablet 150 mg Daily    dextrose 10% bolus 125 mL 125 mL PRN    dextrose 10% bolus 250 mL 250 mL PRN    diphenhydrAMINE capsule 25 mg On Call Procedure    dronabinoL capsule 2.5 mg BID    DULoxetine " capsule 30 mg BID    gabapentin capsule 900 mg TID    glucagon (human recombinant) injection 1 mg PRN    glucose chewable tablet 16 g PRN    glucose chewable tablet 24 g PRN    heparin (porcine) injection 5,000 Units Q8H    hydrALAZINE tablet 25 mg Q8H PRN    HYDROmorphone injection 0.5 mg Q4H PRN    hydrOXYzine HCL tablet 25 mg TID PRN    lactulose 20 gram/30 mL solution Soln 20 g TID    magnesium hydroxide 400 mg/5 ml suspension 2,400 mg Daily PRN    melatonin tablet 6 mg Nightly PRN    meropenem (MERREM) 2 g in sodium chloride 0.9% 100 mL IVPB Q8H    methocarbamoL tablet 1,000 mg Q6H    naloxone 0.4 mg/mL injection 0.02 mg PRN    ondansetron injection 4 mg Q6H PRN    oxyCODONE immediate release tablet 5 mg Q3H PRN    And    oxyCODONE immediate release tablet Tab 10 mg Q3H PRN    pantoprazole EC tablet 40 mg Daily    polyethylene glycol packet 17 g Daily PRN    predniSONE tablet 50 mg On Call Procedure    prochlorperazine injection Soln 2.5 mg Q6H PRN    senna-docusate 8.6-50 mg per tablet 1 tablet BID PRN    simethicone chewable tablet 80 mg TID PRN    sodium chloride 0.9% flush 10 mL Q12H PRN    sodium chloride 0.9% flush 10 mL Q6H    And    sodium chloride 0.9% flush 10 mL PRN    spironolactone tablet 100 mg Daily       Objective:     Vital Signs (Most Recent):  Temp: 97.8 °F (36.6 °C) (02/16/23 1204)  Pulse: 101 (02/16/23 1204)  Resp: 16 (02/16/23 1204)  BP: (!) 105/58 (02/16/23 1204)  SpO2: 98 % (02/16/23 1204)   Vital Signs (24h Range):  Temp:  [97.6 °F (36.4 °C)-98.4 °F (36.9 °C)] 97.8 °F (36.6 °C)  Pulse:  [] 101  Resp:  [12-19] 16  SpO2:  [95 %-98 %] 98 %  BP: ()/(52-58) 105/58     Weight: (!) 140.7 kg (310 lb 3 oz) (02/15/23 0436)  Body mass index is 48.58 kg/m².  Body surface area is 2.58 meters squared.    I/O last 3 completed shifts:  In: 690 [P.O.:690]  Out: 8626 [Urine:1700; Drains:250; Other:6675; Stool:1]    Physical Exam  Constitutional:       General: She  is not in acute distress.     Appearance: Normal appearance. She is obese. She is not ill-appearing.   HENT:      Head: Normocephalic and atraumatic.   Eyes:      General: No scleral icterus.  Cardiovascular:      Rate and Rhythm: Normal rate.      Pulses: Normal pulses.   Pulmonary:      Effort: Pulmonary effort is normal.   Abdominal:      General: There is distension.      Tenderness: There is abdominal tenderness. There is no guarding.   Musculoskeletal:      Right lower leg: Edema present.      Left lower leg: Edema present.   Skin:     General: Skin is warm and dry.      Coloration: Skin is not jaundiced.   Neurological:      Mental Status: She is alert. She is disoriented.      Comments: Somonlent this AM  Soiled self and was unaware       Significant Labs:  All labs within the past 24 hours have been reviewed.     Significant Imaging:  Labs: Reviewed    Assessment/Plan:     Hyponatremia  42 y.o F w/ hx of IV drug use (meth), chronic HCV with cirrhosis, spinal fusion (04/2021), epidural abscess with removal of hardware (02/2022), spinal fusion with hardware (T10 - Pelvis / 03/2022), and neurogenic bladder here with complicated ESBL E.coli epidural abscess s/p washout,corpectomy, fixation being treated w/ meropenem.     Nephrology consulted for hyponatremia. Patient reports uncomfortable abdominal distension and leg swelling. Reports drinking 3-4 cups of her 30oz water tumbler daily. Urine Na <10, urine osm 513. Serum osm 283. Serum Na stable. Normal mentation.    Hyponatremia likely 2/2 to low effective circulating volume 2/2 cirrhosis. Possible poor solute intake relative to free water intake. Unlikely SIADH given low urine Na.   Repeat urine studies 2/14 suggestive of appropriate ADH in setting of cirrhosis    - Fluid restrict to 1 L daily to avoid worsening of hyponatremia. Patient currently not compliant w/ fluid restriction despite counseling. Discussed with patient today  - Volume status remains an  issue, please obtain a BMP now and if sodium > 128 start lasix IV push 40 followed by 10 lasix drip for 6 hours  - Asymptomatic, no role for hypertonic saline  - check afternoon BMP       Chronic hepatitis C with cirrhosis  Will need to f/u with outpatient hepatology for antiviral treatment    Cirrhosis of liver with ascites    On lactulose, lasix and aldactone  Rest of management per primary        Thank you for your consult. I will follow-up with patient. Please contact us if you have any additional questions.    Spencer Khan MD  Nephrology  [facility] - Telemetry Stepdown

## 2023-02-16 NOTE — PLAN OF CARE
Plan of care reviewed with pt. Pt aoo x4. Pt started on lasix gtt. Paracentesis done, 6.7 L removed. Triglycerides levels monitored in ascitic fluid. Pain controled with PRN meds. Staples in tact and 4 MIHIR drains still in place. Hemoglobin stable at 8.2.  Pt remained free of falls, trauma, and injury. VSS. Will continue to monitor.

## 2023-02-16 NOTE — PROGRESS NOTES
[facility] - Telemetry [facility] Medicine  Progress Note    Patient Name: Rachel Zazueta  MRN: [id number]  Patient Class: IP- Inpatient   Admission Date: 1/19/2023  Length of Stay: 28 days  Attending Physician: Noni Vela MD  Primary Care Provider: Dannielle Merino DO        Subjective:     Principal Problem:Weakness of both lower extremities        HPI:  Ms. Zazueta is a 42 y.o F w/ hx ofIV drug use (methamphetamine), chronic HCV with cirrhosis, spinal fusion (04/2021), epidural abscess with removal of hardware (02/2022), spinal fusion with hardware (T10 - Pelvis / 03/2022), obesity (BMI 39.3) and neurogenic bladder who initially presented to [facility] for evaluation of back pain and left leg weakness.  She reports initially noting the left leg weakness when she was about to go to the bathroom and was about to fall but was assisted by her boyfriend.  She was brought to the ED via EMS.  Urinalysis with UTI concerns and blood cultures at OSH grew ESBL E coli so she was treated with meropenem.  During hospitalization, she reports the weakness that started out in her left leg initially then spread upwards to her left thigh, left hip, then crossed over to the right hip and spread to her right leg.  Denies recent IV drug use. She reports her last incidence of drug use was more than 3 years ago and also denies tobacco, and alcohol abuse.  Reports cannabis use.    She also reports more than 10 years ago she had an episode of a headache that lasted about 4 days and was associated with residual defects of a strabismus in the left eye with associated visual disturbances.  She also reports over the past few years her friends have noticed that she sometimes has word-finding difficulty during conversations.     Reports shortness of breath when sitting up, fever, chills, back pain, lower extremity weakness(initally L>R, but now R>L), diarrhea.  Denies cough, nausea, vomiting, constipation, bladder or  bowel incontinence, dysuria, dark urine, new vision changes.    OSH course notable for concerning urinalysis and blood cultures positive for ESBL E coli treated with meropenem.  She was also admit to the ICU where she was treated with Precedex for significant body aches, irritability, and muscle spasms.  Concerns of a murmur on physical exam so she underwent TTE which did not show any vegetations.  Worsening lower extremity weakness workup with MRI head nonspecific, MRI cervical spine with multilevel spondylosis, X-ray spine with multilevel thoracolumbar fusion and diskectomy, focal kyphosis at T9-10 increased compared to prior.  She was started on prophylaxis amoxicillin due to her history of infected hardware.  MRI spine unable to be completed due to patient's body habitus so she was transferred to [facility] [place] for further imaging and Neurosurgery, Neurology evaluation.      Overview/Hospital Course:  Pt admitted as a transfer from [facility] for worsening LE weakness, concern for spinal infection, and need for MRI which could not be done at OSH. Imaging was completed here. Worsening proximal junctional kyphosis noted at T9-10 noted with concern for discitis at T8-9, T9-10. NSGY evaluated.     Found to have T8-T10 kyphosis on MRI. 1/24 underwent laminectomy T8-T10; posterior fusion T8-T12; and washout. 2/2 underwent T4-pelvis fusion and T9-T10 corpectomy.  ID consulted for thoracic discitis infection.  Patient to complete 8 weeks of therapy with meropenem. Patient underwent flap closure with Plastic surgery.  Step-down Hospital Medicine on 02/08.    2/9   On meropenem for MDR-ESCHERICHIA COLI ESBL  sodium trended down to 129.  Neurosurgery following post OR and aiding in pain management. PCA discontinued 2/8 . On oxycodone 10mg PO q4h . requiring IV dilaudid q4 as needed,. drains to gravity output 990ml/24h . Plastic surgery following flap, managing wounds.  Wound VAC discontinued on Wednesday.  accepted to [facility] pain  management consulted for back ache- switched to multimodal therapy. sodium trended down to 127.   2/10 sodium stable at 127 . continues with increased drain output 985ml/24h . negative balance of  2.8 L lasix on hold.  nephrology consulted. abdominal X ray -  Nonobstructive bowel gas pattern.  No free air.  As before, mild gaseous distension of stomach. ammonia at 57 . sono abdomen  . tylenol changed to 1000mg q12h with cirrhosis . sono abdomen -Cirrhotic liver morphology.  Sequela of portal hypertension including moderate ascites, recanalized umbilical vein.  No focal hepatic lesions. Cholecystectomy. . Nephrology recs  fluid restriction to 1 L,  lasix 60 mg IV daily,  and trend sodium q12h.  diazepam discontinued . s/p IR paracentesis today   2/11no evidence of SBP on paracentesis. 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 AFB and Triglyerides on ascitic fluid.  noncompliant with  fluid restriction to 1 L despite counseling. sodium trended down to 126 .received 2 doses of IV lasix 60mg. drain output 250ml. Increased Lasix IV 80 BID  due to UOP 500ml/24h   2/12 UOP of 1250ml/24h. K and P replaced. sodium trended up to 129.  hepatology eval -If ascitic fluid triglyceride level is elevated, needs IR  eval for lymphangiogram and surgery eval for  lymphatic repair. xanax 0.25mg BID PRN for anxiety   2/13 s/p paracentesis. Removed 4,700 ml. DVT sonogram LE negative. s/p psychiatry eval  for anxiety/depression. xanax discontinued. Decreased Bupropion to 150 mg PO Daily . Started Duloxetine 30 mg PO BID and  PRN Hydroxyzine 25 mg PO q8h for anxiety  2/14 Hb 6.9 Transfusion with 1 unit of PRBC . FOBT. sodium trended up to 130 . Diuresing well with lasix IV . DVT sonogram - Nonspecific fluid collection adjacent to the left iliac vein.  Recommend further evaluation with contrast enhanced CT scan . has Iodine contrast allergy- . 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  Diuresing well on IV lasix BID. negative balance of  13.6 L . IR eval -repeat paracentesis to trend volume output and  conservative management   with a low fat diet, octreotide, diuretics. Neurosurgery for input regarding chylous ascitis as possible neurosurgical complication or not. CT abdomen done overnight as well - Postop change in the spine.  There is lucency about the bi iliac screws concerning for loosening.  Bony destruction T10 with interbody strut. started on salt tablets 1g BID x 1day and lasix drip 10mg/h x 6h .  spironolactone  increased to 100 mg Qday. Continue IV Lasix. Dietary consulted for  high-protein and low-fat diet with medium-chain triglycerides  diet.  paracentesis 6.6L removed   2/16 Continuing gentle IV lasix with assistance from nephrology, remains fluid overloaded      Interval History: No events overnight. Patient sleepy this morning. She denies pain.    Review of Systems   Constitutional:  Negative for chills and fever.   Respiratory:  Negative for chest tightness and shortness of breath.    Cardiovascular:  Positive for leg swelling. Negative for chest pain.   Gastrointestinal:  Positive for abdominal distention. Negative for abdominal pain and nausea.   Neurological:  Negative for dizziness and weakness.   Objective:     Vital Signs (Most Recent):  Temp: 97.8 °F (36.6 °C) (02/16/23 1204)  Pulse: 101 (02/16/23 1204)  Resp: 16 (02/16/23 1204)  BP: (!) 105/58 (02/16/23 1204)  SpO2: 98 % (02/16/23 1204)   Vital Signs (24h Range):  Temp:  [97.6 °F (36.4 °C)-98.4 °F (36.9 °C)] 97.8 °F (36.6 °C)  Pulse:  [] 101  Resp:  [12-19] 16  SpO2:  [95 %-98 %] 98 %  BP: ()/(52-58) 105/58     Weight: (!) 140.7 kg (310 lb 3 oz)  Body mass index is 48.58 kg/m².    Intake/Output Summary (Last 24 hours) at 2/16/2023 1346  Last data  filed at 2/15/2023 1947  Gross per 24 hour   Intake 250 ml   Output 7006 ml   Net -6756 ml      Physical Exam  Vitals and nursing note reviewed.   Constitutional:       Appearance: She is well-developed.   Eyes:      Pupils: Pupils are equal, round, and reactive to light.   Cardiovascular:      Rate and Rhythm: Normal rate and regular rhythm.   Pulmonary:      Effort: Pulmonary effort is normal.      Breath sounds: Normal breath sounds.   Abdominal:      General: There is distension.      Palpations: Abdomen is soft.      Tenderness: There is no abdominal tenderness.      Comments: 4 drains in place with serosanguinous fluid   Musculoskeletal:         General: No tenderness.      Right lower leg: Edema present.      Left lower leg: Edema present.   Skin:     General: Skin is warm and dry.   Neurological:      Mental Status: She is alert and oriented to person, place, and time.   Psychiatric:         Behavior: Behavior normal.       Significant Labs: All pertinent labs within the past 24 hours have been reviewed.    Significant Imaging: I have reviewed all pertinent imaging results/findings within the past 24 hours.      Assessment/Plan:      * Weakness of both lower extremities  42 year old woman s/p multiple spinal surgeries presented to OSH with possible infection of shoulder. Found to have UTI and E. Coli bacteremia and progressively worse BLE weakness. [place] [facility] [place]. Underwent Laminectomy T8-T10; Posterior spinal fusion T8-T12; hardware removal and washout on 1/24. Admitted to [facility] postop. On 2/2 underwent T4-pelvis fusion and T9-T10 corpectomies. To complete 8 week course of meropenem per ID for ESBL E coli from bone culture, end date 3/29.     - neuro checks q4h  - HV drains x 2 in place, management per NSGY  - meropenem for ESBL bacteremia per ID, eot 3/29/23  - Plastic surgery follow, wound vac discontinued  - CMP, Mag, Phos, CBC daily  - MAP goals >65, SBP < 180  - PRN  labetalol, hydralazine  - MM pain regimen: PRN Dilaudid, Tylenol, gabapentin, robaxin, PRN oxycodone  - Aggressive bowel regimen  - PT/OT   2/9   On meropenem for MDR-ESCHERICHIA COLI ESBL  sodium trended down to 129.  Neurosurgery following post OR and aiding in pain management. PCA discontinued 2/8 . On oxycodone 10mg PO q4h . requiring IV dilaudid q4 as needed,. drains to gravity output 990ml/24h . Plastic surgery following flap, managing wounds.  Wound VAC discontinued on Wednesday.  accepted to [facility]   2/11 needs post op xrays when able to stand or sit, prior to discharge  2/15  CT abdomen done overnight as well - Postop change in the spine.  There is lucency about the bi iliac screws concerning for loosening.  Bony destruction T10 with interbody strut. neurosurgery f/u     Hyponatremia  - Patient with sodium   Recent Labs   Lab 02/14/23  0340 02/14/23  1508 02/15/23  0421   * 128* 128*   . sodium trending down    2/9 monitor on lasix BID.sodium trended down to 127.   2/10 sodium stable at 127 . continues with increased drain output 985ml/24h . negative balance of  2.8 L lasix on hold.  nephrology consulted.  Nephrology recs  fluid restriction to 1 L,  lasix 60 mg IV daily,  and trend sodium q12h.  diazepam discontinued   2/11  noncompliant with  fluid restriction to 1 L despite counseling. sodium trended down to 126 .received 2 doses of IV lasix 60mg. Increased Lasix IV 80 BID  due to UOP 500ml/24h   2/12 UOP of 1250ml/24h.sodium trended up to 129   2/14 sodium trended up to 130 . Diuresing well with lasix IV .   2/15 started on salt tablets 1g BID and lasix drip 10mg/h x 6h  2/16 continue Lasix 40 IVP x 1 with Lasix drip 10mg/hr x 6     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  IR consulted  for lymphangiogram and  eval for  lymphatic repair  2/15  IR eval -repeat paracentesis to trend volume output and  conservative management    spironolactone   increased to 100 mg Qday. Continue IV Lasix. Dietary consulted for  high-protein and low-fat diet with medium-chain triglycerides  diet.     Ascites due to alcoholic cirrhosis  2/10  sono abdomen -Cirrhotic liver morphology.  Sequela of portal hypertension including moderate ascites, recanalized umbilical vein.  No focal hepatic lesions. Cholecystectomy. s/p IR paracentesis as above  2/11no evidence of SBP on paracentesis. 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 AFB and Triglyerides on ascitic fluid.    2/12  hepatology eval -If ascitic fluid triglyceride level is elevated, needs IR  eval for lymphangiogram and surgery eval for  lymphatic repair    Abdominal pain  2/10  abdominal X ray -  Nonobstructive bowel gas pattern.  No free air.  As before, mild gaseous distension of stomach. ammonia at 57 . sono abdomen with ascitis . simethicone PRN  2/11no evidence of SBP on paracentesis. 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 AFB and Triglyerides on ascitic fluid.      Acute blood loss anemia  Hb 6.4 on 2/6, received 1U pRBCs. Hb 2/7 WNL.  - monitor Hb  - transfuse to maintain Hb >7  2/9    Patient's with Normocytic anemia.. Hemoglobin stable. Etiology likely due to chronic disease .  Current CBC reviewed-    Recent Labs   Lab 02/14/23  0340 02/14/23  1508 02/15/23  0421   HGB 6.9* 7.9* 8.2*    Monitor CBC and transfuse if H/H drops below 7/21.   2/14 Hb 6.9 Transfusion with 1 unit of PRBC . FOBT negative    Kyphosis of thoracolumbar region  See weakness    Thoracic discitis  See Weakness of both lower extremities    Anxiety and depression  Evaluated by Psychiatry at OSH prior to transfer. Recommended increasing bupropion.  - Continue bupropion 300 mg QD  - Gabapentin 600 mg TID for anxiety and neuropathy  - PRN diazepam 5 mg q6h discontinued  2/13   s/p psychiatry eval  for anxiety/depression. xanax  discontinued. Decreased Bupropion to 150 mg PO Daily . Started Duloxetine 30 mg PO BID and  PRN Hydroxyzine 25 mg PO q8h for anxiety    Severe obesity (BMI >= 40)  Body mass index is 48.51 kg/m². Morbid obesity complicates all aspects of disease management from diagnostic modalities to treatment. Weight loss encouraged    Substance use disorder  Denies IV drug use (meth) for past 3 years. Denies alcohol and tobacco abuse.     Chronic hepatitis C with cirrhosis  See above    Cirrhosis of liver with ascites  MELD-Na score: 11 at 2/8/2023  4:55 AM  MELD score: 11 at 2/8/2023  4:55 AM  Calculated from:  Serum Creatinine: 0.5 mg/dL (Using min of 1 mg/dL) at 2/8/2023  4:55 AM  Serum Sodium: 131 mmol/L at 2/8/2023  4:55 AM  Total Bilirubin: 1.6 mg/dL at 2/8/2023  4:55 AM  INR(ratio): 1.3 at 2/6/2023 10:47 AM  Age: 42 years      Patient has reportedly refused transplant evaluation in the past.   - Daily CMP and trend LFTs  - Continue Lactulose 20 g TID  - Lasix 40 mg PO BID  - Will need Hepatology follow up outpatient  2/10   ammonia at 57 . sono abdomen  . tylenol changed to 1000mg q12h with cirrhosis . s/p IR paracentesis today   2/11no evidence of SBP on paracentesis. 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 AFB and Triglyerides on ascitic fluid.    2/13 s/p paracentesis. Removed 4,700 ml. DVT sonogram LE negative.2/13 s/p paracentesis. Removed 4,700 ml. DVT sonogram LE negative. s/p psychiatry eval  for anxiety/depression. xanax discontinued. Decreased Bupropion to 150 mg PO Daily . Started Duloxetine 30 mg PO BID and  PRN Hydroxyzine 25 mg PO q8h for anxiety    Pancytopenia  - acute on chronic  - follows with hematology outpatient  - CBC daily      VTE Risk Mitigation (From admission, onward)         Ordered     heparin (porcine) injection 5,000 Units  Every 8 hours         02/04/23 0848     IP VTE HIGH RISK PATIENT  Once         01/19/23 2153     Place  sequential compression device  Until discontinued         01/19/23 2153     Reason for No Pharmacological VTE Prophylaxis  Once        Question:  Reasons:  Answer:  Physician Provided (leave comment)  Comment:  Potential NSGY procedure    01/19/23 2153                Discharge Planning   SHIRA: 2/20/2023     Code Status: Partial Code   Is the patient medically ready for discharge?: No    Reason for patient still in hospital (select all that apply): Patient unstable  Discharge Plan A: Long-term acute care facility (LTAC)   Discharge Delays: None known at this time              Domingo Ozuna PA-C  Department of Hospital Medicine   [facility] - Telemetry Stepdown

## 2023-02-16 NOTE — PLAN OF CARE
[facility] - Telemetry Stepdown  Discharge Reassessment    Primary Care Provider: Dannielle Merino DO    Expected Discharge Date: 2023    Reassessment (most recent)       Discharge Reassessment - 23 1438          Discharge Reassessment    Assessment Type Discharge Planning Reassessment     Did the patient's condition or plan change since previous assessment? No     Discharge Plan A Long-term acute care facility (LTAC)     Discharge Plan B Skilled Nursing Facility     DME Needed Upon Discharge  none     Discharge Barriers Identified None     Why the patient remains in the hospital Requires continued medical care        Post-Acute Status    Post-Acute Authorization Placement     Post-Acute Placement Status Pending medical clearance/testing   [facility]- resubmitted for auth (about to ) today. [facility]    Discharge Delays None known at this time                 Milla Hagan LCSW  [facility]  Ext. [phone]

## 2023-02-16 NOTE — SUBJECTIVE & OBJECTIVE
Interval History: status post paracentesis yesterday. Serum sodium 131    Review of patient's allergies indicates:   Allergen Reactions    Bee venom protein (honey bee) Anaphylaxis     Patient reports she is allergic to bee stings.    Naproxen Anaphylaxis     Throat closing    12-23- Patient reports taking Ibuprofen 200 mg at home without problems. Verified X3. [place]    Wasp sting [allergen ext-venom-honey bee] Anaphylaxis    Adhesive Blisters    Shellfish containing products     Iodine and iodide containing products Hives and Itching     Allergic to iodine in seafood only    Nuts [tree nut] Rash     Current Facility-Administered Medications   Medication Frequency    0.9%  NaCl infusion (for blood administration) Q24H PRN    0.9%  NaCl infusion (for blood administration) Q24H PRN    0.9%  NaCl infusion (for blood administration) Q24H PRN    acetaminophen tablet 1,000 mg Q12H    bisacodyL suppository 10 mg Every other day    buPROPion TB24 tablet 150 mg Daily    dextrose 10% bolus 125 mL 125 mL PRN    dextrose 10% bolus 250 mL 250 mL PRN    diphenhydrAMINE capsule 25 mg On Call Procedure    dronabinoL capsule 2.5 mg BID    DULoxetine DR capsule 30 mg BID    gabapentin capsule 900 mg TID    glucagon (human recombinant) injection 1 mg PRN    glucose chewable tablet 16 g PRN    glucose chewable tablet 24 g PRN    heparin (porcine) injection 5,000 Units Q8H    hydrALAZINE tablet 25 mg Q8H PRN    HYDROmorphone injection 0.5 mg Q4H PRN    hydrOXYzine HCL tablet 25 mg TID PRN    lactulose 20 gram/30 mL solution Soln 20 g TID    magnesium hydroxide 400 mg/5 ml suspension 2,400 mg Daily PRN    melatonin tablet 6 mg Nightly PRN    meropenem (MERREM) 2 g in sodium chloride 0.9% 100 mL IVPB Q8H    methocarbamoL tablet 1,000 mg Q6H    naloxone 0.4 mg/mL injection 0.02 mg PRN    ondansetron injection 4 mg Q6H PRN    oxyCODONE immediate release tablet 5 mg Q3H PRN    And    oxyCODONE immediate release tablet Tab 10 mg Q3H PRN     pantoprazole EC tablet 40 mg Daily    polyethylene glycol packet 17 g Daily PRN    predniSONE tablet 50 mg On Call Procedure    prochlorperazine injection Soln 2.5 mg Q6H PRN    senna-docusate 8.6-50 mg per tablet 1 tablet BID PRN    simethicone chewable tablet 80 mg TID PRN    sodium chloride 0.9% flush 10 mL Q12H PRN    sodium chloride 0.9% flush 10 mL Q6H    And    sodium chloride 0.9% flush 10 mL PRN    spironolactone tablet 100 mg Daily       Objective:     Vital Signs (Most Recent):  Temp: 97.8 °F (36.6 °C) (02/16/23 1204)  Pulse: 101 (02/16/23 1204)  Resp: 16 (02/16/23 1204)  BP: (!) 105/58 (02/16/23 1204)  SpO2: 98 % (02/16/23 1204)   Vital Signs (24h Range):  Temp:  [97.6 °F (36.4 °C)-98.4 °F (36.9 °C)] 97.8 °F (36.6 °C)  Pulse:  [] 101  Resp:  [12-19] 16  SpO2:  [95 %-98 %] 98 %  BP: ()/(52-58) 105/58     Weight: (!) 140.7 kg (310 lb 3 oz) (02/15/23 0436)  Body mass index is 48.58 kg/m².  Body surface area is 2.58 meters squared.    I/O last 3 completed shifts:  In: 690 [P.O.:690]  Out: 8626 [Urine:1700; Drains:250; Other:6675; Stool:1]    Physical Exam  Constitutional:       General: She is not in acute distress.     Appearance: Normal appearance. She is obese. She is not ill-appearing.   HENT:      Head: Normocephalic and atraumatic.   Eyes:      General: No scleral icterus.  Cardiovascular:      Rate and Rhythm: Normal rate.      Pulses: Normal pulses.   Pulmonary:      Effort: Pulmonary effort is normal.   Abdominal:      General: There is distension.      Tenderness: There is abdominal tenderness. There is no guarding.   Musculoskeletal:      Right lower leg: Edema present.      Left lower leg: Edema present.   Skin:     General: Skin is warm and dry.      Coloration: Skin is not jaundiced.   Neurological:      Mental Status: She is alert. She is disoriented.      Comments: Somonlent this AM  Soiled self and was unaware       Significant Labs:  All labs within the past 24 hours have been  reviewed.     Significant Imaging:  Labs: Reviewed

## 2023-02-16 NOTE — SUBJECTIVE & OBJECTIVE
Interval History: No events overnight. Patient sleepy this morning. She denies pain.    Review of Systems   Constitutional:  Negative for chills and fever.   Respiratory:  Negative for chest tightness and shortness of breath.    Cardiovascular:  Positive for leg swelling. Negative for chest pain.   Gastrointestinal:  Positive for abdominal distention. Negative for abdominal pain and nausea.   Neurological:  Negative for dizziness and weakness.   Objective:     Vital Signs (Most Recent):  Temp: 97.8 °F (36.6 °C) (02/16/23 1204)  Pulse: 101 (02/16/23 1204)  Resp: 16 (02/16/23 1204)  BP: (!) 105/58 (02/16/23 1204)  SpO2: 98 % (02/16/23 1204)   Vital Signs (24h Range):  Temp:  [97.6 °F (36.4 °C)-98.4 °F (36.9 °C)] 97.8 °F (36.6 °C)  Pulse:  [] 101  Resp:  [12-19] 16  SpO2:  [95 %-98 %] 98 %  BP: ()/(52-58) 105/58     Weight: (!) 140.7 kg (310 lb 3 oz)  Body mass index is 48.58 kg/m².    Intake/Output Summary (Last 24 hours) at 2/16/2023 1346  Last data filed at 2/15/2023 1947  Gross per 24 hour   Intake 250 ml   Output 7006 ml   Net -6756 ml      Physical Exam  Vitals and nursing note reviewed.   Constitutional:       Appearance: She is well-developed.   Eyes:      Pupils: Pupils are equal, round, and reactive to light.   Cardiovascular:      Rate and Rhythm: Normal rate and regular rhythm.   Pulmonary:      Effort: Pulmonary effort is normal.      Breath sounds: Normal breath sounds.   Abdominal:      General: There is distension.      Palpations: Abdomen is soft.      Tenderness: There is no abdominal tenderness.      Comments: 4 drains in place with serosanguinous fluid   Musculoskeletal:         General: No tenderness.      Right lower leg: Edema present.      Left lower leg: Edema present.   Skin:     General: Skin is warm and dry.   Neurological:      Mental Status: She is alert and oriented to person, place, and time.   Psychiatric:         Behavior: Behavior normal.       Significant Labs: All  pertinent labs within the past 24 hours have been reviewed.    Significant Imaging: I have reviewed all pertinent imaging results/findings within the past 24 hours.

## 2023-02-17 LAB
ALBUMIN SERPL BCP-MCNC: 2 G/DL (ref 3.5–5.2)
ALP SERPL-CCNC: 149 U/L (ref 55–135)
ALT SERPL W/O P-5'-P-CCNC: 30 U/L (ref 10–44)
ANION GAP SERPL CALC-SCNC: 6 MMOL/L (ref 8–16)
AST SERPL-CCNC: 47 U/L (ref 10–40)
BASOPHILS # BLD AUTO: 0.03 K/UL (ref 0–0.2)
BASOPHILS NFR BLD: 1.2 % (ref 0–1.9)
BILIRUB SERPL-MCNC: 1.4 MG/DL (ref 0.1–1)
BUN SERPL-MCNC: 24 MG/DL (ref 6–20)
CALCIUM SERPL-MCNC: 7.8 MG/DL (ref 8.7–10.5)
CHLORIDE SERPL-SCNC: 98 MMOL/L (ref 95–110)
CO2 SERPL-SCNC: 30 MMOL/L (ref 23–29)
CREAT SERPL-MCNC: 0.6 MG/DL (ref 0.5–1.4)
DIFFERENTIAL METHOD: ABNORMAL
EOSINOPHIL # BLD AUTO: 0.1 K/UL (ref 0–0.5)
EOSINOPHIL NFR BLD: 3.7 % (ref 0–8)
ERYTHROCYTE [DISTWIDTH] IN BLOOD BY AUTOMATED COUNT: 18.5 % (ref 11.5–14.5)
EST. GFR  (NO RACE VARIABLE): >60 ML/MIN/1.73 M^2
GLUCOSE SERPL-MCNC: 88 MG/DL (ref 70–110)
HCT VFR BLD AUTO: 26.9 % (ref 37–48.5)
HGB BLD-MCNC: 8.1 G/DL (ref 12–16)
IMM GRANULOCYTES # BLD AUTO: 0.01 K/UL (ref 0–0.04)
IMM GRANULOCYTES NFR BLD AUTO: 0.4 % (ref 0–0.5)
INR PPP: 1.3 (ref 0.8–1.2)
LYMPHOCYTES # BLD AUTO: 0.6 K/UL (ref 1–4.8)
LYMPHOCYTES NFR BLD: 23.7 % (ref 18–48)
MAGNESIUM SERPL-MCNC: 1.7 MG/DL (ref 1.6–2.6)
MCH RBC QN AUTO: 28.2 PG (ref 27–31)
MCHC RBC AUTO-ENTMCNC: 30.1 G/DL (ref 32–36)
MCV RBC AUTO: 94 FL (ref 82–98)
MONOCYTES # BLD AUTO: 0.5 K/UL (ref 0.3–1)
MONOCYTES NFR BLD: 18.7 % (ref 4–15)
NEUTROPHILS # BLD AUTO: 1.3 K/UL (ref 1.8–7.7)
NEUTROPHILS NFR BLD: 52.3 % (ref 38–73)
NRBC BLD-RTO: 0 /100 WBC
PHOSPHATE SERPL-MCNC: 3.6 MG/DL (ref 2.7–4.5)
PLATELET # BLD AUTO: 93 K/UL (ref 150–450)
PMV BLD AUTO: 9.2 FL (ref 9.2–12.9)
POTASSIUM SERPL-SCNC: 3.7 MMOL/L (ref 3.5–5.1)
PROT SERPL-MCNC: 5 G/DL (ref 6–8.4)
PROTHROMBIN TIME: 13 SEC (ref 9–12.5)
RBC # BLD AUTO: 2.87 M/UL (ref 4–5.4)
SODIUM SERPL-SCNC: 134 MMOL/L (ref 136–145)
WBC # BLD AUTO: 2.41 K/UL (ref 3.9–12.7)

## 2023-02-17 PROCEDURE — 27000207 HC ISOLATION

## 2023-02-17 PROCEDURE — A4216 STERILE WATER/SALINE, 10 ML: HCPCS | Performed by: PSYCHIATRY & NEUROLOGY

## 2023-02-17 PROCEDURE — 97112 NEUROMUSCULAR REEDUCATION: CPT | Mod: CQ

## 2023-02-17 PROCEDURE — 97530 THERAPEUTIC ACTIVITIES: CPT

## 2023-02-17 PROCEDURE — 20600001 HC STEP DOWN PRIVATE ROOM

## 2023-02-17 PROCEDURE — 90832 PSYTX W PT 30 MINUTES: CPT | Mod: AF,HB,, | Performed by: STUDENT IN AN ORGANIZED HEALTH CARE EDUCATION/TRAINING PROGRAM

## 2023-02-17 PROCEDURE — 63600175 PHARM REV CODE 636 W HCPCS: Performed by: STUDENT IN AN ORGANIZED HEALTH CARE EDUCATION/TRAINING PROGRAM

## 2023-02-17 PROCEDURE — 25000003 PHARM REV CODE 250

## 2023-02-17 PROCEDURE — 97530 THERAPEUTIC ACTIVITIES: CPT | Mod: CQ

## 2023-02-17 PROCEDURE — 97535 SELF CARE MNGMENT TRAINING: CPT

## 2023-02-17 PROCEDURE — 99233 SBSQ HOSP IP/OBS HIGH 50: CPT | Mod: ,,, | Performed by: PHYSICIAN ASSISTANT

## 2023-02-17 PROCEDURE — 90832 PR PSYCHOTHERAPY W/PATIENT, 30 MIN: ICD-10-PCS | Mod: AF,HB,, | Performed by: STUDENT IN AN ORGANIZED HEALTH CARE EDUCATION/TRAINING PROGRAM

## 2023-02-17 PROCEDURE — 85610 PROTHROMBIN TIME: CPT | Performed by: STUDENT IN AN ORGANIZED HEALTH CARE EDUCATION/TRAINING PROGRAM

## 2023-02-17 PROCEDURE — 25000003 PHARM REV CODE 250: Performed by: HOSPITALIST

## 2023-02-17 PROCEDURE — 94761 N-INVAS EAR/PLS OXIMETRY MLT: CPT

## 2023-02-17 PROCEDURE — 25000003 PHARM REV CODE 250: Performed by: PSYCHIATRY & NEUROLOGY

## 2023-02-17 PROCEDURE — 25000003 PHARM REV CODE 250: Performed by: STUDENT IN AN ORGANIZED HEALTH CARE EDUCATION/TRAINING PROGRAM

## 2023-02-17 PROCEDURE — 63600175 PHARM REV CODE 636 W HCPCS

## 2023-02-17 PROCEDURE — 63600175 PHARM REV CODE 636 W HCPCS: Performed by: PHYSICIAN ASSISTANT

## 2023-02-17 PROCEDURE — 99233 PR SUBSEQUENT HOSPITAL CARE,LEVL III: ICD-10-PCS | Mod: ,,, | Performed by: PHYSICIAN ASSISTANT

## 2023-02-17 PROCEDURE — 85025 COMPLETE CBC W/AUTO DIFF WBC: CPT

## 2023-02-17 PROCEDURE — 83735 ASSAY OF MAGNESIUM: CPT | Performed by: STUDENT IN AN ORGANIZED HEALTH CARE EDUCATION/TRAINING PROGRAM

## 2023-02-17 PROCEDURE — 63600175 PHARM REV CODE 636 W HCPCS: Performed by: NURSE PRACTITIONER

## 2023-02-17 PROCEDURE — 84100 ASSAY OF PHOSPHORUS: CPT | Performed by: STUDENT IN AN ORGANIZED HEALTH CARE EDUCATION/TRAINING PROGRAM

## 2023-02-17 PROCEDURE — 97110 THERAPEUTIC EXERCISES: CPT | Mod: CQ

## 2023-02-17 PROCEDURE — 80053 COMPREHEN METABOLIC PANEL: CPT | Performed by: STUDENT IN AN ORGANIZED HEALTH CARE EDUCATION/TRAINING PROGRAM

## 2023-02-17 PROCEDURE — 25000003 PHARM REV CODE 250: Performed by: INTERNAL MEDICINE

## 2023-02-17 RX ADMIN — HEPARIN SODIUM 5000 UNITS: 5000 INJECTION INTRAVENOUS; SUBCUTANEOUS at 03:02

## 2023-02-17 RX ADMIN — BUPROPION HYDROCHLORIDE 150 MG: 150 TABLET, FILM COATED, EXTENDED RELEASE ORAL at 08:02

## 2023-02-17 RX ADMIN — HEPARIN SODIUM 5000 UNITS: 5000 INJECTION INTRAVENOUS; SUBCUTANEOUS at 06:02

## 2023-02-17 RX ADMIN — LACTULOSE 20 G: 20 SOLUTION ORAL at 10:02

## 2023-02-17 RX ADMIN — LACTULOSE 20 G: 20 SOLUTION ORAL at 08:02

## 2023-02-17 RX ADMIN — METHOCARBAMOL 1000 MG: 500 TABLET ORAL at 06:02

## 2023-02-17 RX ADMIN — MEROPENEM 2 G: 1 INJECTION INTRAVENOUS at 01:02

## 2023-02-17 RX ADMIN — DULOXETINE 30 MG: 30 CAPSULE, DELAYED RELEASE ORAL at 10:02

## 2023-02-17 RX ADMIN — FUROSEMIDE 5 MG/HR: 10 INJECTION, SOLUTION INTRAMUSCULAR; INTRAVENOUS at 02:02

## 2023-02-17 RX ADMIN — ACETAMINOPHEN 1000 MG: 500 TABLET ORAL at 10:02

## 2023-02-17 RX ADMIN — SPIRONOLACTONE 100 MG: 100 TABLET, FILM COATED ORAL at 08:02

## 2023-02-17 RX ADMIN — GABAPENTIN 900 MG: 300 CAPSULE ORAL at 03:02

## 2023-02-17 RX ADMIN — Medication 10 ML: at 06:02

## 2023-02-17 RX ADMIN — PANTOPRAZOLE SODIUM 40 MG: 40 TABLET, DELAYED RELEASE ORAL at 08:02

## 2023-02-17 RX ADMIN — HEPARIN SODIUM 5000 UNITS: 5000 INJECTION INTRAVENOUS; SUBCUTANEOUS at 10:02

## 2023-02-17 RX ADMIN — DRONABINOL 2.5 MG: 2.5 CAPSULE ORAL at 08:02

## 2023-02-17 RX ADMIN — METHOCARBAMOL 1000 MG: 500 TABLET ORAL at 11:02

## 2023-02-17 RX ADMIN — GABAPENTIN 900 MG: 300 CAPSULE ORAL at 08:02

## 2023-02-17 RX ADMIN — METHOCARBAMOL 1000 MG: 500 TABLET ORAL at 01:02

## 2023-02-17 RX ADMIN — GABAPENTIN 900 MG: 300 CAPSULE ORAL at 10:02

## 2023-02-17 RX ADMIN — Medication 10 ML: at 12:02

## 2023-02-17 RX ADMIN — MEROPENEM 2 G: 1 INJECTION INTRAVENOUS at 06:02

## 2023-02-17 RX ADMIN — OXYCODONE HYDROCHLORIDE 10 MG: 10 TABLET ORAL at 06:02

## 2023-02-17 RX ADMIN — ACETAMINOPHEN 1000 MG: 500 TABLET ORAL at 08:02

## 2023-02-17 RX ADMIN — LACTULOSE 20 G: 20 SOLUTION ORAL at 03:02

## 2023-02-17 RX ADMIN — OXYCODONE HYDROCHLORIDE 10 MG: 10 TABLET ORAL at 10:02

## 2023-02-17 RX ADMIN — DRONABINOL 2.5 MG: 2.5 CAPSULE ORAL at 10:02

## 2023-02-17 RX ADMIN — MEROPENEM 2 G: 1 INJECTION INTRAVENOUS at 11:02

## 2023-02-17 RX ADMIN — DULOXETINE 30 MG: 30 CAPSULE, DELAYED RELEASE ORAL at 08:02

## 2023-02-17 NOTE — PROGRESS NOTES
[facility] - Telemetry [facility] Medicine  Progress Note    Patient Name: Rachel Zazueta  MRN: [id number]  Patient Class: IP- Inpatient   Admission Date: 1/19/2023  Length of Stay: 29 days  Attending Physician: Noni Vela MD  Primary Care Provider: Dannielle Merino DO        Subjective:     Principal Problem:Weakness of both lower extremities        HPI:  Ms. Zazueta is a 42 y.o F w/ hx ofIV drug use (methamphetamine), chronic HCV with cirrhosis, spinal fusion (04/2021), epidural abscess with removal of hardware (02/2022), spinal fusion with hardware (T10 - Pelvis / 03/2022), obesity (BMI 39.3) and neurogenic bladder who initially presented to [facility] for evaluation of back pain and left leg weakness.  She reports initially noting the left leg weakness when she was about to go to the bathroom and was about to fall but was assisted by her boyfriend.  She was brought to the ED via EMS.  Urinalysis with UTI concerns and blood cultures at OSH grew ESBL E coli so she was treated with meropenem.  During hospitalization, she reports the weakness that started out in her left leg initially then spread upwards to her left thigh, left hip, then crossed over to the right hip and spread to her right leg.  Denies recent IV drug use. She reports her last incidence of drug use was more than 3 years ago and also denies tobacco, and alcohol abuse.  Reports cannabis use.    She also reports more than 10 years ago she had an episode of a headache that lasted about 4 days and was associated with residual defects of a strabismus in the left eye with associated visual disturbances.  She also reports over the past few years her friends have noticed that she sometimes has word-finding difficulty during conversations.     Reports shortness of breath when sitting up, fever, chills, back pain, lower extremity weakness(initally L>R, but now R>L), diarrhea.  Denies cough, nausea, vomiting, constipation, bladder or  bowel incontinence, dysuria, dark urine, new vision changes.    OSH course notable for concerning urinalysis and blood cultures positive for ESBL E coli treated with meropenem.  She was also admit to the ICU where she was treated with Precedex for significant body aches, irritability, and muscle spasms.  Concerns of a murmur on physical exam so she underwent TTE which did not show any vegetations.  Worsening lower extremity weakness workup with MRI head nonspecific, MRI cervical spine with multilevel spondylosis, X-ray spine with multilevel thoracolumbar fusion and diskectomy, focal kyphosis at T9-10 increased compared to prior.  She was started on prophylaxis amoxicillin due to her history of infected hardware.  MRI spine unable to be completed due to patient's body habitus so she was transferred to [facility] for further imaging and Neurosurgery, Neurology evaluation.      Overview/Hospital Course:  Pt admitted as a transfer from [facility] for worsening LE weakness, concern for spinal infection, and need for MRI which could not be done at OSH. Imaging was completed here. Worsening proximal junctional kyphosis noted at T9-10 noted with concern for discitis at T8-9, T9-10. NSGY evaluated.     Found to have T8-T10 kyphosis on MRI. 1/24 underwent laminectomy T8-T10; posterior fusion T8-T12; and washout. 2/2 underwent T4-pelvis fusion and T9-T10 corpectomy.  ID consulted for thoracic discitis infection.  Patient to complete 8 weeks of therapy with meropenem. Patient underwent flap closure with Plastic surgery.  Step-down Hospital Medicine on 02/08.    2/9   On meropenem for MDR-ESCHERICHIA COLI ESBL  sodium trended down to 129.  Neurosurgery following post OR and aiding in pain management. PCA discontinued 2/8 . On oxycodone 10mg PO q4h . requiring IV dilaudid q4 as needed,. drains to gravity output 990ml/24h . Plastic surgery following flap, managing wounds.  Wound VAC discontinued on Wednesday.  accepted to [facility] pain  management consulted for back ache- switched to multimodal therapy. sodium trended down to 127.   2/10 sodium stable at 127 . continues with increased drain output 985ml/24h . negative balance of  2.8 L lasix on hold.  nephrology consulted. abdominal X ray -  Nonobstructive bowel gas pattern.  No free air.  As before, mild gaseous distension of stomach. ammonia at 57 . sono abdomen  . tylenol changed to 1000mg q12h with cirrhosis . sono abdomen -Cirrhotic liver morphology.  Sequela of portal hypertension including moderate ascites, recanalized umbilical vein.  No focal hepatic lesions. Cholecystectomy. . Nephrology recs  fluid restriction to 1 L,  lasix 60 mg IV daily,  and trend sodium q12h.  diazepam discontinued . s/p IR paracentesis today   2/11no evidence of SBP on paracentesis. 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 AFB and Triglyerides on ascitic fluid.  noncompliant with  fluid restriction to 1 L despite counseling. sodium trended down to 126 .received 2 doses of IV lasix 60mg. drain output 250ml. Increased Lasix IV 80 BID  due to UOP 500ml/24h   2/12 UOP of 1250ml/24h. K and P replaced. sodium trended up to 129.  hepatology eval -If ascitic fluid triglyceride level is elevated, needs IR  eval for lymphangiogram and surgery eval for  lymphatic repair. xanax 0.25mg BID PRN for anxiety   2/13 s/p paracentesis. Removed 4,700 ml. DVT sonogram LE negative. s/p psychiatry eval  for anxiety/depression. xanax discontinued. Decreased Bupropion to 150 mg PO Daily . Started Duloxetine 30 mg PO BID and  PRN Hydroxyzine 25 mg PO q8h for anxiety  2/14 Hb 6.9 Transfusion with 1 unit of PRBC . FOBT. sodium trended up to 130 . Diuresing well with lasix IV . DVT sonogram - Nonspecific fluid collection adjacent to the left iliac vein.  Recommend further evaluation with contrast enhanced CT scan . has Iodine contrast allergy- . 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  Diuresing well on IV lasix BID. negative balance of  13.6 L . IR eval -repeat paracentesis to trend volume output and  conservative management   with a low fat diet, octreotide, diuretics. Neurosurgery for input regarding chylous ascitis as possible neurosurgical complication or not. CT abdomen done overnight as well - Postop change in the spine.  There is lucency about the bi iliac screws concerning for loosening.  Bony destruction T10 with interbody strut. started on salt tablets 1g BID x 1day and lasix drip 10mg/h x 6h .  spironolactone  increased to 100 mg Qday. Continue IV Lasix. Dietary consulted for  high-protein and low-fat diet with medium-chain triglycerides  diet.  paracentesis 6.6L removed   2/16 Continuing gentle IV lasix with assistance from nephrology, remains fluid overloaded  2/17: continue Lasix infusion, responding well      Interval History: No events overnight. Patient sleepy this morning. She denies pain. Diuresing well    Review of Systems   Constitutional:  Negative for chills and fever.   Respiratory:  Negative for chest tightness and shortness of breath.    Cardiovascular:  Positive for leg swelling. Negative for chest pain.   Gastrointestinal:  Positive for abdominal distention. Negative for abdominal pain and nausea.   Neurological:  Negative for dizziness and weakness.   Objective:     Vital Signs (Most Recent):  Temp: 97.9 °F (36.6 °C) (02/17/23 1515)  Pulse: 102 (02/17/23 1515)  Resp: 18 (02/17/23 1515)  BP: (!) 89/43 (02/17/23 1515)  SpO2: 96 % (02/17/23 1515)   Vital Signs (24h Range):  Temp:  [97.3 °F (36.3 °C)-98 °F (36.7 °C)] 97.9 °F (36.6 °C)  Pulse:  [] 102  Resp:  [16-20] 18  SpO2:  [96 %-100 %] 96 %  BP: ()/(43-55) 89/43     Weight: (!) 140.7 kg (310 lb 3 oz)  Body mass index is 48.58  kg/m².    Intake/Output Summary (Last 24 hours) at 2/17/2023 1548  Last data filed at 2/17/2023 0609  Gross per 24 hour   Intake --   Output 2270 ml   Net -2270 ml        Physical Exam  Vitals and nursing note reviewed.   Constitutional:       Appearance: She is well-developed.   Eyes:      Pupils: Pupils are equal, round, and reactive to light.   Cardiovascular:      Rate and Rhythm: Normal rate and regular rhythm.   Pulmonary:      Effort: Pulmonary effort is normal.      Breath sounds: Normal breath sounds.   Abdominal:      General: There is distension.      Palpations: Abdomen is soft.      Tenderness: There is no abdominal tenderness.      Comments: 4 drains in place with serosanguinous fluid   Musculoskeletal:         General: No tenderness.      Right lower leg: Edema present.      Left lower leg: Edema present.   Skin:     General: Skin is warm and dry.   Neurological:      Mental Status: She is alert and oriented to person, place, and time.   Psychiatric:         Behavior: Behavior normal.       Significant Labs: All pertinent labs within the past 24 hours have been reviewed.    Significant Imaging: I have reviewed all pertinent imaging results/findings within the past 24 hours.      Assessment/Plan:      * Weakness of both lower extremities  42 year old woman s/p multiple spinal surgeries presented to OSH with possible infection of shoulder. Found to have UTI and E. Coli bacteremia and progressively worse BLE weakness. Transferred to [facility] for neurosurgical evaluation. Underwent Laminectomy T8-T10; Posterior spinal fusion T8-T12; hardware removal and washout on 1/24. Admitted to [facility] postop. On 2/2 underwent T4-pelvis fusion and T9-T10 corpectomies. To complete 8 week course of meropenem per ID for ESBL E coli from bone culture, end date 3/29.     - neuro checks q4h  - HV drains x 2 in place, management per NSGY  - meropenem for ESBL bacteremia per ID, eot 3/29/23  - Plastic surgery follow, wound vac  discontinued  - CMP, Mag, Phos, CBC daily  - MAP goals >65, SBP < 180  - PRN labetalol, hydralazine  - MM pain regimen: PRN Dilaudid, Tylenol, gabapentin, robaxin, PRN oxycodone  - Aggressive bowel regimen  - PT/OT   2/9   On meropenem for MDR-ESCHERICHIA COLI ESBL  sodium trended down to 129.  Neurosurgery following post OR and aiding in pain management. PCA discontinued 2/8 . On oxycodone 10mg PO q4h . requiring IV dilaudid q4 as needed,. drains to gravity output 990ml/24h . Plastic surgery following flap, managing wounds.  Wound VAC discontinued on Wednesday.  accepted to [facility]   2/11 needs post op xrays when able to stand or sit, prior to discharge  2/15  CT abdomen done overnight as well - Postop change in the spine.  There is lucency about the bi iliac screws concerning for loosening.  Bony destruction T10 with interbody strut. neurosurgery f/u     Hyponatremia  - Patient with sodium   Recent Labs   Lab 02/16/23  0746 02/16/23  1238 02/17/23  0736   * 131* 134*   . sodium trending down    2/9 monitor on lasix BID.sodium trended down to 127.   2/10 sodium stable at 127 . continues with increased drain output 985ml/24h . negative balance of  2.8 L lasix on hold.  nephrology consulted.  Nephrology recs  fluid restriction to 1 L,  lasix 60 mg IV daily,  and trend sodium q12h.  diazepam discontinued   2/11  noncompliant with  fluid restriction to 1 L despite counseling. sodium trended down to 126 .received 2 doses of IV lasix 60mg. Increased Lasix IV 80 BID  due to UOP 500ml/24h   2/12 UOP of 1250ml/24h.sodium trended up to 129   2/14 sodium trended up to 130 . Diuresing well with lasix IV .   2/15 started on salt tablets 1g BID and lasix drip 10mg/h x 6h  2/16 continue Lasix 40 IVP x 1 with Lasix drip 10mg/hr x 6   2/17 sodium 134    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  IR consulted  for lymphangiogram and  eval for  lymphatic repair  2/15  IR eval  -repeat paracentesis to trend volume output and  conservative management    spironolactone  increased to 100 mg Qday. Continue IV Lasix. Dietary consulted for  high-protein and low-fat diet with medium-chain triglycerides  diet.     Ascites due to alcoholic cirrhosis  2/10  sono abdomen -Cirrhotic liver morphology.  Sequela of portal hypertension including moderate ascites, recanalized umbilical vein.  No focal hepatic lesions. Cholecystectomy. s/p IR paracentesis as above  2/11no evidence of SBP on paracentesis. 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 AFB and Triglyerides on ascitic fluid.    2/12  hepatology eval -If ascitic fluid triglyceride level is elevated, needs IR  eval for lymphangiogram and surgery eval for  lymphatic repair    Abdominal pain  2/10  abdominal X ray -  Nonobstructive bowel gas pattern.  No free air.  As before, mild gaseous distension of stomach. ammonia at 57 . sono abdomen with ascitis . simethicone PRN  2/11no evidence of SBP on paracentesis. 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 AFB and Triglyerides on ascitic fluid.      Acute blood loss anemia  Hb 6.4 on 2/6, received 1U pRBCs. Hb 2/7 WNL.  - monitor Hb  - transfuse to maintain Hb >7  2/9    Patient's with Normocytic anemia.. Hemoglobin stable. Etiology likely due to chronic disease .  Current CBC reviewed-    Recent Labs   Lab 02/15/23  0421 02/16/23  0746 02/17/23  0736   HGB 8.2* 8.2* 8.1*    Monitor CBC and transfuse if H/H drops below 7/21.   2/14 Hb 6.9 Transfusion with 1 unit of PRBC . FOBT negative  2/17: hgb 8.1, stable    Kyphosis of thoracolumbar region  See weakness    Thoracic discitis  See Weakness of both lower extremities    Anxiety and depression  Evaluated by Psychiatry at OSH prior to transfer. Recommended increasing bupropion.  - Continue bupropion 300 mg QD  - Gabapentin 600 mg TID for anxiety  and neuropathy  - PRN diazepam 5 mg q6h discontinued  2/13   s/p psychiatry eval  for anxiety/depression. xanax discontinued. Decreased Bupropion to 150 mg PO Daily . Started Duloxetine 30 mg PO BID and  PRN Hydroxyzine 25 mg PO q8h for anxiety    Severe obesity (BMI >= 40)  Body mass index is 48.58 kg/m². Morbid obesity complicates all aspects of disease management from diagnostic modalities to treatment. Weight loss encouraged    Substance use disorder  Denies IV drug use (meth) for past 3 years. Denies alcohol and tobacco abuse.     Chronic hepatitis C with cirrhosis  See above    Cirrhosis of liver with ascites  MELD-Na score: 11 at 2/8/2023  4:55 AM  MELD score: 11 at 2/8/2023  4:55 AM  Calculated from:  Serum Creatinine: 0.5 mg/dL (Using min of 1 mg/dL) at 2/8/2023  4:55 AM  Serum Sodium: 131 mmol/L at 2/8/2023  4:55 AM  Total Bilirubin: 1.6 mg/dL at 2/8/2023  4:55 AM  INR(ratio): 1.3 at 2/6/2023 10:47 AM  Age: 42 years      Patient has reportedly refused transplant evaluation in the past.   - Daily CMP and trend LFTs  - Continue Lactulose 20 g TID  - Lasix 40 mg PO BID  - Will need Hepatology follow up outpatient  2/10   ammonia at 57 . sono abdomen  . tylenol changed to 1000mg q12h with cirrhosis . s/p IR paracentesis today   2/11no evidence of SBP on paracentesis. 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 AFB and Triglyerides on ascitic fluid.    2/13 s/p paracentesis. Removed 4,700 ml. DVT sonogram LE negative.2/13 s/p paracentesis. Removed 4,700 ml. DVT sonogram LE negative. s/p psychiatry eval  for anxiety/depression. xanax discontinued. Decreased Bupropion to 150 mg PO Daily . Started Duloxetine 30 mg PO BID and  PRN Hydroxyzine 25 mg PO q8h for anxiety    Pancytopenia  - acute on chronic  - follows with hematology outpatient  - CBC daily      VTE Risk Mitigation (From admission, onward)         Ordered     heparin (porcine) injection 5,000 Units   Every 8 hours         02/04/23 0848     IP VTE HIGH RISK PATIENT  Once         01/19/23 2153     Place sequential compression device  Until discontinued         01/19/23 2153     Reason for No Pharmacological VTE Prophylaxis  Once        Question:  Reasons:  Answer:  Physician Provided (leave comment)  Comment:  Potential NSGY procedure    01/19/23 2153                Discharge Planning   SHIRA: 2/20/2023     Code Status: Partial Code   Is the patient medically ready for discharge?: No    Reason for patient still in hospital (select all that apply): Patient trending condition  Discharge Plan A: Long-term acute care facility (LTAC)   Discharge Delays: None known at this time              Domingo Ozuna PA-C  Department of Hospital Medicine   [facility] - Telemetry Stepdown

## 2023-02-17 NOTE — ASSESSMENT & PLAN NOTE
Would minimize risk of acute liver injury by restricting tylenol use, however will defer to primary

## 2023-02-17 NOTE — PROGRESS NOTES
[facility]  Psychology  Consult Note      PROGRESS NOTE - INTERVENTION  Patient Name: Rachel Zazueta  MRN: [id number]  Date: 02/17/2023  Admission Date: 1/19/2023   Length of Stay: Hospital Day: 30   Attending Physician: Noni Vela MD    HISTORY OF PRESENTING ILLNESS: Ms. Zazueta is a 42 y.o F w/ hx ofIV drug use (methamphetamine), chronic HCV with cirrhosis, spinal fusion (04/2021), epidural abscess with removal of hardware (02/2022), spinal fusion with hardware (T10 - Pelvis / 03/2022), obesity (BMI 39.3) and neurogenic bladder who initially presented to [facility] for evaluation of back pain and left leg weakness    BRIEF ASSESSMENT  Mood: Regular periods of low mood that may last all day. Denied anhedonia and suicidal ideation.  Anxiety: Excessive anxiety, difficulty controlling worry, anxiety-related muscle tension. Described worries about health, recovery, mobility, strained relationship with boyfriend.  Pain: Rated current pain as 8/10. Denied concerns regarding pain management.  Sleep: Moderate impairment involving delayed onset and frequent nighttime awakenings. Patient stated that she is awake the majority of the night then sleeps for a significant portion of the day.  Appetite: Denied concerns.    INTERVENTION  Intervention Type: Motivational Interviewing  Session Content: Engaged patient if brief MI intervention focused on participation in PT. Anxiety can be a barrier to PT participation. Elicited description of health values and recovery goals. Patient described the importance of standing and improved mobility. She expressed a strong desire to return to recreational activities, such as fishing trips and visits to the casino.    MENTAL STATUS EXAM & OBSERVATIONS  Appearance:  Good grooming and hygiene. Dressed in hospital gown.. Appeared stated age.      Orientation: Oriented x 4.   Speech: Normal rate, volume, and prosody.    Thought Processes: Logical and  goal-oriented.      Thought Content: Normal. No suicidal or homicidal ideation. No indications of obsessions or delusions.   Mood: Anxious.   Affect: Full range, congruent with mood and session content..   Attention & Concentration: Intact. No deficits noted.   Insight: Good   Judgment: Good.   Behavioral Observations: Cooperative and engaged. Appeared drowsy. Laying with head of bed elevated. Good eye contact. No signs of psychomotor agitation or retardation.     DIAGNOSTIC IMPRESSION & PLAN  Patient Active Problem List   Diagnosis    Pancytopenia    Cirrhosis of liver with ascites    Chronic hepatitis C with cirrhosis    Substance use disorder    Abscess in epidural space of lumbar spine    Spondylodiscitis    Spinal stenosis at L4-L5 level    Thrombocytopenia    Tobacco use disorder    Amphetamine use disorder, severe    Cannabis use disorder, moderate, dependence    Chronic midline low back pain with sciatica    Severe obesity (BMI >= 40)    Mild episode of recurrent major depressive disorder    Abnormal LFTs    Coagulopathy    Hyperbilirubinemia    Discitis of lumbar region    Edema    S/P spinal fusion    Pyogenic arthritis of left shoulder region    Murmur, cardiac    Severe sepsis    Pulmonary hypertension    Debility    Anxiety and depression    Weakness of both lower extremities    Thoracic discitis    Kyphosis of thoracolumbar region    Acute blood loss anemia    Hyponatremia    Abdominal pain    Ascites due to alcoholic cirrhosis    Chylous ascites    Hypokalemia       Impression: Ms. Zazueta is a 42 y.o F with a complex PMHx admitted due to BLE weakness. Psychology consulted to treat anxiety and depression. Psychiatry following. Patient endorsed low mood and moderate anxiety symptoms. Anxiety can be a barrier to participation in PT. Patient responded well to brief behavioral intervention.     Plan: Psychology will continue to follow.    Recommendations: Outpatient follow up with Psychology or  Counseling.      Thank you for the opportunity to participate in this patient's care.      Length of Service: 25 minutes    Manpreet Yousif, Ph.D.  Dept. of Psychiatry  [facility]

## 2023-02-17 NOTE — ASSESSMENT & PLAN NOTE
42 y.o F w/ hx of IV drug use (meth), chronic HCV with cirrhosis, spinal fusion (04/2021), epidural abscess with removal of hardware (02/2022), spinal fusion with hardware (T10 - Pelvis / 03/2022), and neurogenic bladder here with complicated ESBL E.coli epidural abscess s/p washout,corpectomy, fixation being treated w/ meropenem.     Nephrology consulted for hyponatremia. Patient reports uncomfortable abdominal distension and leg swelling. Reports drinking 3-4 cups of her 30oz water tumbler daily. Urine Na <10, urine osm 513. Serum osm 283. Serum Na stable. Normal mentation.    Hyponatremia likely 2/2 to low effective circulating volume 2/2 cirrhosis. Possible poor solute intake relative to free water intake. Unlikely SIADH given low urine Na.   Repeat urine studies 2/14 suggestive of appropriate ADH in setting of cirrhosis    -continue lasix 10 for next 24 hours  -CMP 1600 to assess of change in sodium, will adjust lasix at that time

## 2023-02-17 NOTE — NURSING
Plan of care reviewed with pt. Pt aoo x4. Lasix gtt continued, bolus of 40 mg given. Pain controled with PRN meds. Staples in tact and 4 MIHIR drains still in place. Hemoglobin stable at 8.2.  Potassium replaced. Pt remained free of falls, trauma, and injury. VSS. Will continue to monitor.

## 2023-02-17 NOTE — ASSESSMENT & PLAN NOTE
42 year old woman s/p multiple spinal surgeries presented to OSH with possible infection of shoulder. Found to have UTI and E. Coli bacteremia and progressively worse BLE weakness. Transferred to [facility] for neurosurgical evaluation. Underwent Laminectomy T8-T10; Posterior spinal fusion T8-T12; hardware removal and washout on 1/24. Admitted to [facility] postop. On 2/2 underwent T4-pelvis fusion and T9-T10 corpectomies. To complete 8 week course of meropenem per ID for ESBL E coli from bone culture, end date 3/29.     - neuro checks q4h  - HV drains x 2 in place, management per NSGY  - meropenem for ESBL bacteremia per ID, eot 3/29/23  - Plastic surgery follow, wound vac discontinued  - CMP, Mag, Phos, CBC daily  - MAP goals >65, SBP < 180  - PRN labetalol, hydralazine  - MM pain regimen: PRN Dilaudid, Tylenol, gabapentin, robaxin, PRN oxycodone  - Aggressive bowel regimen  - PT/OT   2/9   On meropenem for MDR-ESCHERICHIA COLI ESBL  sodium trended down to 129.  Neurosurgery following post OR and aiding in pain management. PCA discontinued 2/8 . On oxycodone 10mg PO q4h . requiring IV dilaudid q4 as needed,. drains to gravity output 990ml/24h . Plastic surgery following flap, managing wounds.  Wound VAC discontinued on Wednesday.  accepted to [facility]   2/11 needs post op xrays when able to stand or sit, prior to discharge  2/15  CT abdomen done overnight as well - Postop change in the spine.  There is lucency about the bi iliac screws concerning for loosening.  Bony destruction T10 with interbody strut. neurosurgery f/u

## 2023-02-17 NOTE — ASSESSMENT & PLAN NOTE
Body mass index is 48.58 kg/m². Morbid obesity complicates all aspects of disease management from diagnostic modalities to treatment. Weight loss encouraged

## 2023-02-17 NOTE — PT/OT/SLP PROGRESS
"Occupational Therapy   Treatment    Co-treat with PTA due to pt's requiring 2 skilled therapists to safely perform mobililty    Name: Rachel Zazueta  MRN: [id number]  Admitting Diagnosis:  Weakness of both lower extremities  15 Days Post-Op    Recommendations:     Discharge Recommendations: nursing facility, skilled  Discharge Equipment Recommendations:  hospital bed, wheelchair, lift device  Barriers to discharge:  Inaccessible home environment, Decreased caregiver support (increased skilled assistance required)    Assessment:     Rachel Zazueta is a 42 y.o. female with a medical diagnosis of Weakness of both lower extremities.  Pt demonstrated gopd participation this date, performing sit to stand transfer EOB.  However, she continues to require significant assistance to perform ADLs and mobility.  She presents with the following.  Performance deficits affecting function are weakness, impaired endurance, impaired self care skills, impaired functional mobility, gait instability, impaired balance, orthopedic precautions, decreased ROM, decreased upper extremity function, decreased lower extremity function, pain, decreased safety awareness, edema.     Rehab Prognosis:  Good; patient would benefit from acute skilled OT services to address these deficits and reach maximum level of function.       Plan:     Patient to be seen 3 x/week to address the above listed problems via self-care/home management, therapeutic activities, therapeutic exercises, neuromuscular re-education  Plan of Care Expires: 02/20/23  Plan of Care Reviewed with: patient    Subjective   "Will I be able to walk again?"  Pain/Comfort:  Pain Rating 1: other (see comments) (Not rated; pt said "a little")  Location - Side 1: Bilateral  Location - Orientation 1: generalized  Location 1: back (sides and buttocks)  Pain Addressed 1: Pre-medicate for activity, Reposition, Distraction  Pain Rating Post-Intervention 1: other (see comments) (not " rated)    Objective:     Communicated with: nursing and PTA prior to session.  Patient found HOB elevated with telemetry, PureWick, MIHRI drain upon OT entry to room.    General Precautions: Standard, fall, contact    Orthopedic Precautions:spinal precautions  Braces: TLSO  Respiratory Status: Room air     Occupational Performance:     Bed Mobility:    Patient completed Rolling/Turning to Left with  maximal assistance  Patient completed Scooting/Bridging to the L toward HOB while sitting EOB with maximal assistance and 2 persons  Patient completed Supine to Sit with maximal assistance and 2 persons  Patient completed Sit to Supine with maximal assistance and 2 persons     Functional Mobility/Transfers:  Patient attempted STS t/f from EOB with Max A of 2 with RW, but she was returned to sitting for safety due to her sliding forward.  Pt completed Sit <> Stand Transfer from EOB with maximal assistance and of 2 persons with hand-held assist with BLE blocked    Activities of Daily Living:  Grooming: CGA-Min A for sitting balance while performing oral care EOB with tray table setup.  She required cues not to lean forward against the wheeled tray table.  Pt had intermittent posterior and R lateral lean.  Upper Body Dressing: maximal assistance to dalton/doff TLSO while seated EOB  Lower Body Dressing: total assistance to dalton/doff non-skid socks while supine  Toileting: Max A to remove PureWick while supine prior to activity.      [facility] 6 Click ADL: 12    Treatment & Education:  Pt edu on role of OT, POC, safety when performing self care tasks, benefit of performing EOB/OOB activity, and safety when performing functional transfers and mobility.    - Self care tasks completed-- as noted above      - Pt performed the following BUE AROM exercises while sitting EOB with PTA providing posterior support: shoulder abduction/adduction, and rows with scapular retraction x 1 set of 10 reps each.  She was fatigued after performing.   Exercises performed to improve her functional ROM of BUE that's required to perform self-care tasks.     Patient left HOB elevated with all lines intact, call button in reach, and nursing notified    GOALS:   Multidisciplinary Problems       Occupational Therapy Goals          Problem: Occupational Therapy    Goal Priority Disciplines Outcome Interventions   Occupational Therapy Goal     OT, PT/OT Ongoing, Progressing    Description: Goals to be met by: 2/24/2023     Patient will increase functional independence with ADLs by performing:    UE Dressing with Moderate Assistance.  Grooming while seated with Minimal Assistance. - Met  Sitting at edge of bed x15 minutes with Minimal Assistance. -ongoing  Rolling to Bilateral with Minimal Assistance.   Supine to sit with Moderate Assistance.                         Time Tracking:     OT Date of Treatment: 02/17/23  OT Start Time: 0928  OT Stop Time: 1010  OT Total Time (min): 42 min    Billable Minutes:Self Care/Home Management 15 min  Therapeutic Activity 27 min    OT/ROSS: OT          2/17/2023

## 2023-02-17 NOTE — SUBJECTIVE & OBJECTIVE
Interval History: No events overnight. Patient sleepy this morning. She denies pain. Diuresing well    Review of Systems   Constitutional:  Negative for chills and fever.   Respiratory:  Negative for chest tightness and shortness of breath.    Cardiovascular:  Positive for leg swelling. Negative for chest pain.   Gastrointestinal:  Positive for abdominal distention. Negative for abdominal pain and nausea.   Neurological:  Negative for dizziness and weakness.   Objective:     Vital Signs (Most Recent):  Temp: 97.9 °F (36.6 °C) (02/17/23 1515)  Pulse: 102 (02/17/23 1515)  Resp: 18 (02/17/23 1515)  BP: (!) 89/43 (02/17/23 1515)  SpO2: 96 % (02/17/23 1515)   Vital Signs (24h Range):  Temp:  [97.3 °F (36.3 °C)-98 °F (36.7 °C)] 97.9 °F (36.6 °C)  Pulse:  [] 102  Resp:  [16-20] 18  SpO2:  [96 %-100 %] 96 %  BP: ()/(43-55) 89/43     Weight: (!) 140.7 kg (310 lb 3 oz)  Body mass index is 48.58 kg/m².    Intake/Output Summary (Last 24 hours) at 2/17/2023 1548  Last data filed at 2/17/2023 0609  Gross per 24 hour   Intake --   Output 2270 ml   Net -2270 ml        Physical Exam  Vitals and nursing note reviewed.   Constitutional:       Appearance: She is well-developed.   Eyes:      Pupils: Pupils are equal, round, and reactive to light.   Cardiovascular:      Rate and Rhythm: Normal rate and regular rhythm.   Pulmonary:      Effort: Pulmonary effort is normal.      Breath sounds: Normal breath sounds.   Abdominal:      General: There is distension.      Palpations: Abdomen is soft.      Tenderness: There is no abdominal tenderness.      Comments: 4 drains in place with serosanguinous fluid   Musculoskeletal:         General: No tenderness.      Right lower leg: Edema present.      Left lower leg: Edema present.   Skin:     General: Skin is warm and dry.   Neurological:      Mental Status: She is alert and oriented to person, place, and time.   Psychiatric:         Behavior: Behavior normal.       Significant Labs:  All pertinent labs within the past 24 hours have been reviewed.    Significant Imaging: I have reviewed all pertinent imaging results/findings within the past 24 hours.

## 2023-02-17 NOTE — ASSESSMENT & PLAN NOTE
Hb 6.4 on 2/6, received 1U pRBCs. Hb 2/7 WNL.  - monitor Hb  - transfuse to maintain Hb >7  2/9    Patient's with Normocytic anemia.. Hemoglobin stable. Etiology likely due to chronic disease .  Current CBC reviewed-    Recent Labs   Lab 02/15/23  0421 02/16/23  0746 02/17/23  0736   HGB 8.2* 8.2* 8.1*    Monitor CBC and transfuse if H/H drops below 7/21.   2/14 Hb 6.9 Transfusion with 1 unit of PRBC . FOBT negative  2/17: hgb 8.1, stable

## 2023-02-17 NOTE — PLAN OF CARE
Problem: Occupational Therapy  Goal: Occupational Therapy Goal  Description: Goals to be met by: 2/24/2023     Patient will increase functional independence with ADLs by performing:    UE Dressing with Moderate Assistance.  Grooming while seated with Minimal Assistance. - Met  Sitting at edge of bed x15 minutes with Minimal Assistance. -ongoing  Rolling to Bilateral with Minimal Assistance.   Supine to sit with Moderate Assistance.    Outcome: Ongoing, Progressing     Continue OT POC.

## 2023-02-17 NOTE — PROGRESS NOTES
"[facility] - Telemetry Stepdown  Nephrology  Progress Note    Patient Name: Rachel Zazueta  MRN: [id number]  Admission Date: 1/19/2023  Hospital Length of Stay: 29 days  Attending Provider: Noni Vela MD   Primary Care Physician: Dannielle Merino DO  Principal Problem:Weakness of both lower extremities    Subjective:     HPI: Per HM note: "42 y.o F w/ hx ofIV drug use (methamphetamine), chronic HCV with cirrhosis, spinal fusion (04/2021), epidural abscess with removal of hardware (02/2022), spinal fusion with hardware (T10 - Pelvis / 03/2022), obesity (BMI 39.3) and neurogenic bladder and recent shoulder surgery who initially presented to [facility] for evaluation of back pain and left leg weakness. She reports initially noting the left leg weakness when she was about to go to the bathroom and was about to fall but was assisted by her boyfriend.  She was brought to the ED via EMS. Urinalysis with UTI concerns and blood cultures at OSH grew ESBL E coli, and shoulder effusion concern for infection so she was treated with meropenem.  During hospitalization, she reports the weakness that started out in her left leg initially then spread upwards to her left thigh, left hip, then crossed over to the right hip and spread to her right leg.  Denies recent IV drug use. She reports her last incidence of drug use was more than 3 years ago and also denies tobacco, and alcohol abuse.  Reports cannabis use.     OSH course notable for concerning urinalysis and blood cultures positive for ESBL E coli treated with meropenem.  She was also admit to the ICU where she was treated with Precedex for significant body aches, irritability, and muscle spasms.  Concerns of a murmur on physical exam so she underwent TTE which did not show any vegetations.  Worsening lower extremity weakness workup with MRI head nonspecific, MRI cervical spine with multilevel spondylosis, X-ray spine with multilevel thoracolumbar fusion and " Subjective:      Shellie Dobbins is a 60 y.o. female who presents with Establish Care            HPI Comments: 1. Mixed hyperlipidemia  Currently treated for HLD, taking meds with no new myalgias or joint pain, watching fats in diet  controlled        2. Osteoporosis, unspecified osteoporosis type, unspecified pathological fracture presence  Patient is currently being treated with meds for osteoporosis. Taking meds with no side effects or GI issues, trying to include increased calcium and vitamin D in diet and weight bearing exercise  controlled        3. Hypothyroidism due to acquired atrophy of thyroid  Patient is being treated for hypothyroidism, currently taking meds, no symptoms of fast or slow heartbeat and energy level steady. No new hair loss or skin symptoms. Labs have been checked in past year and are stable on current dose.  controlled        4. Need for zoster vaccination    - zoster vaccine live, PF, (ZOSTAVAX) 27535 UNT/0.65ML injection; Inject 0.65 mL as instructed Once for 1 dose.  Dispense: 0.65 mL; Refill: 0    5. Need for Tdap vaccination    - Tdap =>8yo IM    Past Medical History:    BREAST AUGMENTATION                             1999            Comment:Saline    Mononucleosis                                   16yo            Comment:Mono induced Hepatitis    Vitamin d deficiency                            2/8/2010      Atopic dermatitis                               2/8/2010      Familial hyperlipidemia                         2/8/2010      Osteopenia                                      6/18/2011     Medial meniscus tear                            9/1/2011      Baker's cyst of knee                            9/1/2011      Soft tissue mass                                1/4/2013      Menopause                                       9/14/2013       Comment:2007    H/O tear of meniscus of knee joint              3/12/2014       Comment:Right knee. Had surgery 2011. Now left knee                 "diskectomy, focal kyphosis at T9-10 increased compared to prior.  She was started on prophylaxis amoxicillin due to her history of infected hardware.  MRI spine unable to be completed due to patient's body habitus so she was transferred to [facility] for further imaging and Neurosurgery, Neurology evaluation." Underwent laminectomy, posterior fusion and washout on 1/24. ID following for discitis, has her on meropenem. Stepped down to  on 2/8.     Nephrology consulted for hyponatremia. Patient reports uncomfortable abdominal distension and leg swelling. Reports drinking 3-4 cups of her 30oz water tumbler daily. Urine Na <10, urine osm 513. Serum Na trend 136--> 132-->131-->129-->127-->135-->134-->127. Normal mentation. Cr/BUN wnl. Albumin 1.8, protein 4.5, bili 1.4.          Interval History: no acute events overnight. Sodium 134 this AM    Review of patient's allergies indicates:   Allergen Reactions    Bee venom protein (honey bee) Anaphylaxis     Patient reports she is allergic to bee stings.    Naproxen Anaphylaxis     Throat closing    12-23- Patient reports taking Ibuprofen 200 mg at home without problems. Verified X3. [place]    Wasp sting [allergen ext-venom-honey bee] Anaphylaxis    Adhesive Blisters    Shellfish containing products     Iodine and iodide containing products Hives and Itching     Allergic to iodine in seafood only    Nuts [tree nut] Rash     Current Facility-Administered Medications   Medication Frequency    0.9%  NaCl infusion (for blood administration) Q24H PRN    0.9%  NaCl infusion (for blood administration) Q24H PRN    0.9%  NaCl infusion (for blood administration) Q24H PRN    acetaminophen tablet 1,000 mg Q12H    bisacodyL suppository 10 mg Every other day    buPROPion TB24 tablet 150 mg Daily    dextrose 10% bolus 125 mL 125 mL PRN    dextrose 10% bolus 250 mL 250 mL PRN    diphenhydrAMINE capsule 25 mg On Call Procedure    dronabinoL capsule 2.5 mg BID    DULoxetine DR capsule " pain. Felt great last year, working out.                Overdid it working out. Increased pain to point               very swollen, hard to bend and straighten left                leg starting last week. Has felt like about to                give way when walking today. Has MRI scheduled.               Feels just like the right knee felt before                surgery. No brace. Not helping due to swelling.               Taking ibuprofen. 600 mg bid.     DDD (degenerative disc disease), cervical       3/12/2014     CLAIR (obstructive sleep apnea)                   9/14/2013       Comment:AHI 17  In 2009. Uses nightgard    High cholesterol                                            Past Surgical History:    COLONOSCOPY                                      2007          PRIMARY C SECTION                                1984          TUBAL COAGULATION LAPAROSCOPIC BILATERAL         1997          MAMMOPLASTY AUGMENTATION                         1999            Comment:saline    KNEE ARTHROSCOPY                                 10/6/2011       Comment:Performed by ALEXANDRIA BRAR at [facility]    MEDIAL MENISCECTOMY                              10/6/2011       Comment:Performed by ALEXANDRIA BRAR at [facility]    MENISCECTOMY                                    Left may 2014        Comment:[facility]os - left knee    MASS EXCISION ORTHO                             Bilateral 9/21/2016       Comment:Procedure: MASS EXCISION ORTHO - EXCISION                POSTERIOR NECK LIPOMA;  Surgeon: Troy Kapoor M.D.;  Location: SURGERY SURG[facility]                ORS;  Service:     Smoking Status: Never Smoker                      Smokeless Status: Never Used                        Alcohol Use: Yes           0.5 oz/week       1 Glasses of wine per week       Comment: 1-2 per week    Review of patient's family history indicates:    Stroke                          Mother                      Comment: d 86    Heart Disease                  Father                      Comment: d. 59, rheum heart dz    Hyperlipidemia                 Father                    Cancer                         Sister                      Comment: Melanoma    Diabetes                       Neg Hx                    Heart Disease                  Sister                      Comment: aneurysm on heart      Current outpatient prescriptions: •  zoster vaccine live, PF, (ZOSTAVAX) 53195 UNT/0.65ML injection, Inject 0.65 mL as instructed Once for 1 dose., Disp: 0.65 mL, Rfl: 0•  alendronate (FOSAMAX) 70 MG Tab, TAKE 1 TABLET BY MOUTH EVERY 7 DAYS ON AN EMPTY STOMACH WITH A GLASS OF WATER. SIT UPRIGHT AND DO NOT CONSUME FOOD OR MEDS FOR 30 MINUTES, Disp: 12 Tab, Rfl: 3•  atorvastatin (LIPITOR) 40 MG Tab, TAKE 1 TABLET BY MOUTH EVERY NIGHT AT BEDTIME, Disp: 90 Tab, Rfl: 1•  levothyroxine (SYNTHROID) 50 MCG Tab, Take 1 Tab by mouth every morning. ON EMPTY STOMACH, Disp: 90 Tab, Rfl: 2•  Multiple Vitamins-Minerals (MULTIVITAMIN PO), Take  by mouth every day., Disp: , Rfl: •  Cholecalciferol (VITAMIN D3) 2000 UNIT Cap, Take  by mouth every day., Disp: , Rfl: •  Potassium Gluconate 595 MG Cap, Take  by mouth every day., Disp: , Rfl: •  Calcium Carbonate (CALCIUM 600 PO), Take  by mouth every day., Disp: , Rfl: •  triamcinolone acetonide (KENALOG) 0.1 % Ointment, Thin layer to clean dry skin twice daily as needed for rash., Disp: 60 g, Rfl: 4•  ibuprofen (MOTRIN) 200 MG TABS, Take 600 mg by mouth every 8 hours as needed. Indications: Mild to Moderate Pain, Disp: , Rfl:           Review of Systems   Constitutional: Negative.  Negative for fever and chills.        Past Medical History:    BREAST AUGMENTATION                             1999            Comment:Saline    Mononucleosis                                   18yo            Comment:Mono induced Hepatitis    Vitamin d deficiency                         30 mg BID    gabapentin capsule 900 mg TID    glucagon (human recombinant) injection 1 mg PRN    glucose chewable tablet 16 g PRN    glucose chewable tablet 24 g PRN    heparin (porcine) injection 5,000 Units Q8H    hydrALAZINE tablet 25 mg Q8H PRN    HYDROmorphone injection 0.5 mg Q4H PRN    hydrOXYzine HCL tablet 25 mg TID PRN    lactulose 20 gram/30 mL solution Soln 20 g TID    magnesium hydroxide 400 mg/5 ml suspension 2,400 mg Daily PRN    melatonin tablet 6 mg Nightly PRN    meropenem (MERREM) 2 g in sodium chloride 0.9% 100 mL IVPB Q8H    methocarbamoL tablet 1,000 mg Q6H    naloxone 0.4 mg/mL injection 0.02 mg PRN    ondansetron injection 4 mg Q6H PRN    oxyCODONE immediate release tablet 5 mg Q3H PRN    And    oxyCODONE immediate release tablet Tab 10 mg Q3H PRN    pantoprazole EC tablet 40 mg Daily    polyethylene glycol packet 17 g Daily PRN    predniSONE tablet 50 mg On Call Procedure    prochlorperazine injection Soln 2.5 mg Q6H PRN    senna-docusate 8.6-50 mg per tablet 1 tablet BID PRN    simethicone chewable tablet 80 mg TID PRN    sodium chloride 0.9% flush 10 mL Q12H PRN    sodium chloride 0.9% flush 10 mL Q6H    And    sodium chloride 0.9% flush 10 mL PRN    spironolactone tablet 100 mg Daily       Objective:     Vital Signs (Most Recent):  Temp: 97.7 °F (36.5 °C) (02/17/23 0456)  Pulse: 98 (02/17/23 0456)  Resp: 16 (02/17/23 0608)  BP: (!) 102/55 (02/17/23 0456)  SpO2: 100 % (02/17/23 0456)   Vital Signs (24h Range):  Temp:  [97.3 °F (36.3 °C)-98.3 °F (36.8 °C)] 97.7 °F (36.5 °C)  Pulse:  [] 98  Resp:  [16-20] 16  SpO2:  [97 %-100 %] 100 %  BP: ()/(53-58) 102/55     Weight: (!) 140.7 kg (310 lb 3 oz) (02/15/23 0436)  Body mass index is 48.58 kg/m².  Body surface area is 2.58 meters squared.    I/O last 3 completed shifts:  In: 464 [P.O.:464]  Out: 3070 [Urine:2900; Drains:170]    Physical Exam  Constitutional:       General: She is not in acute distress.          2/8/2010      Atopic dermatitis                               2/8/2010      Familial hyperlipidemia                         2/8/2010      Osteopenia                                      6/18/2011     Medial meniscus tear                            9/1/2011      Baker's cyst of knee                            9/1/2011      Soft tissue mass                                1/4/2013      Menopause                                       9/14/2013       Comment:2007    H/O tear of meniscus of knee joint              3/12/2014       Comment:Right knee. Had surgery 2011. Now left knee                pain. Felt great last year, working out.                Overdid it working out. Increased pain to point               very swollen, hard to bend and straighten left                leg starting last week. Has felt like about to                give way when walking today. Has MRI scheduled.               Feels just like the right knee felt before                surgery. No brace. Not helping due to swelling.               Taking ibuprofen. 600 mg bid.     DDD (degenerative disc disease), cervical       3/12/2014     CLAIR (obstructive sleep apnea)                   9/14/2013       Comment:AHI 17  In 2009. Uses nightgard    High cholesterol                                            Past Surgical History:    COLONOSCOPY                                      2007          PRIMARY C SECTION                                1984          TUBAL COAGULATION LAPAROSCOPIC BILATERAL         1997          MAMMOPLASTY AUGMENTATION                         1999            Comment:saline    KNEE ARTHROSCOPY                                 10/6/2011       Comment:Performed by ALEXANDRIA BRAR at SURGERY                [facility] ORS    MEDIAL MENISCECTOMY                              10/6/2011       Comment:Performed by ALEXANDRIA BRAR at SURGERY                [facility] ORS    MENISCECTOMY                                    Left  Appearance: Normal appearance. She is obese. She is not ill-appearing.   HENT:      Head: Normocephalic and atraumatic.   Eyes:      General: No scleral icterus.  Cardiovascular:      Rate and Rhythm: Normal rate.      Pulses: Normal pulses.   Pulmonary:      Effort: Pulmonary effort is normal.   Abdominal:      General: There is distension.      Tenderness: There is abdominal tenderness. There is no guarding.   Musculoskeletal:      Right lower leg: Edema present.      Left lower leg: Edema present.   Skin:     General: Skin is warm and dry.      Coloration: Skin is not jaundiced.   Neurological:      Mental Status: She is alert. She is disoriented.      Comments: Somonlent this AM  Soiled self and was unaware       Significant Labs:  All labs within the past 24 hours have been reviewed.     Significant Imaging:  Labs: Reviewed    Assessment/Plan:     Renal/  Hyponatremia  42 y.o F w/ hx of IV drug use (meth), chronic HCV with cirrhosis, spinal fusion (04/2021), epidural abscess with removal of hardware (02/2022), spinal fusion with hardware (T10 - Pelvis / 03/2022), and neurogenic bladder here with complicated ESBL E.coli epidural abscess s/p washout,corpectomy, fixation being treated w/ meropenem.     Nephrology consulted for hyponatremia. Patient reports uncomfortable abdominal distension and leg swelling. Reports drinking 3-4 cups of her 30oz water tumbler daily. Urine Na <10, urine osm 513. Serum osm 283. Serum Na stable. Normal mentation.    Hyponatremia likely 2/2 to low effective circulating volume 2/2 cirrhosis. Possible poor solute intake relative to free water intake. Unlikely SIADH given low urine Na.   Repeat urine studies 2/14 suggestive of appropriate ADH in setting of cirrhosis    -continue lasix 10 for next 24 hours  -CMP 1600 to assess of change in sodium, will adjust lasix at that time        GI  Chronic hepatitis C with cirrhosis  Will need to f/u with outpatient hepatology for antiviral  "may 2014        Comment:[facility]os - left knee    MASS EXCISION ORTHO                             Bilateral 9/21/2016       Comment:Procedure: MASS EXCISION ORTHO - EXCISION                POSTERIOR NECK LIPOMA;  Surgeon: Troy Kapoor M.D.;  Location: [facility]GERY [facility]                ORS;  Service:     Smoking Status: Never Smoker                      Smokeless Status: Never Used                        Alcohol Use: Yes           0.5 oz/week       1 Glasses of wine per week       Comment: 1-2 per week    Review of patient's family history indicates:    Stroke                         Mother                      Comment: d 86    Heart Disease                  Father                      Comment: d. 59, rheum heart dz    Hyperlipidemia                 Father                    Cancer                         Sister                      Comment: Melanoma    Diabetes                       Neg Hx                    Heart Disease                  Sister                      Comment: aneurysm on heart     HENT: Negative.    Eyes: Negative.    Respiratory: Negative.  Negative for cough and hemoptysis.    Cardiovascular: Negative.  Negative for chest pain and palpitations.   Gastrointestinal: Negative.  Negative for constipation.   Genitourinary: Negative.  Negative for dysuria and urgency.   Musculoskeletal: Negative.  Negative for myalgias and neck pain.   Skin: Negative.  Negative for rash.   Neurological: Negative.  Negative for dizziness and headaches.   Endo/Heme/Allergies: Negative.    Psychiatric/Behavioral: Negative.  Negative for suicidal ideas.          Objective:     /62 mmHg  Pulse 71  Temp(Src) 36.6 °C (97.9 °F)  Resp 16  Ht 1.778 m (5' 10\")  Wt 78.926 kg (174 lb)  BMI 24.97 kg/m2  SpO2 95%  LMP 11/10/2006     Physical Exam   Constitutional: She is oriented to person, place, and time. No distress.   HENT:   Head: Normocephalic and atraumatic.   Right Ear: External ear normal. " treatment    Cirrhosis of liver with ascites  Would minimize risk of acute liver injury by restricting tylenol use, however will defer to primary        Thank you for your consult. I will follow-up with patient. Please contact us if you have any additional questions.    Spencer Khan MD  Nephrology  [facility] - Telemetry Stepdown     Left Ear: External ear normal.   Nose: Nose normal.   Mouth/Throat: Oropharynx is clear and moist. No oropharyngeal exudate.   Eyes: Pupils are equal, round, and reactive to light. Right eye exhibits no discharge. Left eye exhibits no discharge. No scleral icterus.   Neck: Normal range of motion. Neck supple. No JVD present. No tracheal deviation present. No thyromegaly present.   Cardiovascular: Normal rate, regular rhythm, normal heart sounds and intact distal pulses.  Exam reveals no gallop and no friction rub.    No murmur heard.  Pulmonary/Chest: Effort normal and breath sounds normal. No stridor. No respiratory distress. She has no wheezes. She has no rales. She exhibits no tenderness.   Abdominal: Soft. She exhibits no distension. There is no tenderness.   Lymphadenopathy:     She has no cervical adenopathy.   Neurological: She is alert and oriented to person, place, and time.   Skin: Skin is warm and dry. She is not diaphoretic.   Psychiatric: Judgment normal.   Nursing note and vitals reviewed.              Assessment/Plan:     1. Mixed hyperlipidemia      2. Osteoporosis, unspecified osteoporosis type, unspecified pathological fracture presence      3. Hypothyroidism due to acquired atrophy of thyroid      4. Need for zoster vaccination    - zoster vaccine live, PF, (ZOSTAVAX) 23375 UNT/0.65ML injection; Inject 0.65 mL as instructed Once for 1 dose.  Dispense: 0.65 mL; Refill: 0    5. Need for Tdap vaccination    - Tdap =>6yo IM

## 2023-02-17 NOTE — SUBJECTIVE & OBJECTIVE
Interval History: no acute events overnight. Sodium 134 this AM    Review of patient's allergies indicates:   Allergen Reactions    Bee venom protein (honey bee) Anaphylaxis     Patient reports she is allergic to bee stings.    Naproxen Anaphylaxis     Throat closing    12-23- Patient reports taking Ibuprofen 200 mg at home without problems. Verified X3. [place]    Wasp sting [allergen ext-venom-honey bee] Anaphylaxis    Adhesive Blisters    Shellfish containing products     Iodine and iodide containing products Hives and Itching     Allergic to iodine in seafood only    Nuts [tree nut] Rash     Current Facility-Administered Medications   Medication Frequency    0.9%  NaCl infusion (for blood administration) Q24H PRN    0.9%  NaCl infusion (for blood administration) Q24H PRN    0.9%  NaCl infusion (for blood administration) Q24H PRN    acetaminophen tablet 1,000 mg Q12H    bisacodyL suppository 10 mg Every other day    buPROPion TB24 tablet 150 mg Daily    dextrose 10% bolus 125 mL 125 mL PRN    dextrose 10% bolus 250 mL 250 mL PRN    diphenhydrAMINE capsule 25 mg On Call Procedure    dronabinoL capsule 2.5 mg BID    DULoxetine DR capsule 30 mg BID    gabapentin capsule 900 mg TID    glucagon (human recombinant) injection 1 mg PRN    glucose chewable tablet 16 g PRN    glucose chewable tablet 24 g PRN    heparin (porcine) injection 5,000 Units Q8H    hydrALAZINE tablet 25 mg Q8H PRN    HYDROmorphone injection 0.5 mg Q4H PRN    hydrOXYzine HCL tablet 25 mg TID PRN    lactulose 20 gram/30 mL solution Soln 20 g TID    magnesium hydroxide 400 mg/5 ml suspension 2,400 mg Daily PRN    melatonin tablet 6 mg Nightly PRN    meropenem (MERREM) 2 g in sodium chloride 0.9% 100 mL IVPB Q8H    methocarbamoL tablet 1,000 mg Q6H    naloxone 0.4 mg/mL injection 0.02 mg PRN    ondansetron injection 4 mg Q6H PRN    oxyCODONE immediate release tablet 5 mg Q3H PRN    And    oxyCODONE immediate release tablet Tab 10 mg Q3H PRN    pantoprazole  EC tablet 40 mg Daily    polyethylene glycol packet 17 g Daily PRN    predniSONE tablet 50 mg On Call Procedure    prochlorperazine injection Soln 2.5 mg Q6H PRN    senna-docusate 8.6-50 mg per tablet 1 tablet BID PRN    simethicone chewable tablet 80 mg TID PRN    sodium chloride 0.9% flush 10 mL Q12H PRN    sodium chloride 0.9% flush 10 mL Q6H    And    sodium chloride 0.9% flush 10 mL PRN    spironolactone tablet 100 mg Daily       Objective:     Vital Signs (Most Recent):  Temp: 97.7 °F (36.5 °C) (02/17/23 0456)  Pulse: 98 (02/17/23 0456)  Resp: 16 (02/17/23 0608)  BP: (!) 102/55 (02/17/23 0456)  SpO2: 100 % (02/17/23 0456)   Vital Signs (24h Range):  Temp:  [97.3 °F (36.3 °C)-98.3 °F (36.8 °C)] 97.7 °F (36.5 °C)  Pulse:  [] 98  Resp:  [16-20] 16  SpO2:  [97 %-100 %] 100 %  BP: ()/(53-58) 102/55     Weight: (!) 140.7 kg (310 lb 3 oz) (02/15/23 0436)  Body mass index is 48.58 kg/m².  Body surface area is 2.58 meters squared.    I/O last 3 completed shifts:  In: 464 [P.O.:464]  Out: 3070 [Urine:2900; Drains:170]    Physical Exam  Constitutional:       General: She is not in acute distress.     Appearance: Normal appearance. She is obese. She is not ill-appearing.   HENT:      Head: Normocephalic and atraumatic.   Eyes:      General: No scleral icterus.  Cardiovascular:      Rate and Rhythm: Normal rate.      Pulses: Normal pulses.   Pulmonary:      Effort: Pulmonary effort is normal.   Abdominal:      General: There is distension.      Tenderness: There is abdominal tenderness. There is no guarding.   Musculoskeletal:      Right lower leg: Edema present.      Left lower leg: Edema present.   Skin:     General: Skin is warm and dry.      Coloration: Skin is not jaundiced.   Neurological:      Mental Status: She is alert. She is disoriented.      Comments: Somonlent this AM  Soiled self and was unaware       Significant Labs:  All labs within the past 24 hours have been reviewed.     Significant  Imaging:  Labs: Reviewed

## 2023-02-17 NOTE — ASSESSMENT & PLAN NOTE
- Patient with sodium   Recent Labs   Lab 02/16/23  0746 02/16/23  1238 02/17/23  0736   * 131* 134*   . sodium trending down    2/9 monitor on lasix BID.sodium trended down to 127.   2/10 sodium stable at 127 . continues with increased drain output 985ml/24h . negative balance of  2.8 L lasix on hold.  nephrology consulted.  Nephrology recs  fluid restriction to 1 L,  lasix 60 mg IV daily,  and trend sodium q12h.  diazepam discontinued   2/11  noncompliant with  fluid restriction to 1 L despite counseling. sodium trended down to 126 .received 2 doses of IV lasix 60mg. Increased Lasix IV 80 BID  due to UOP 500ml/24h   2/12 UOP of 1250ml/24h.sodium trended up to 129   2/14 sodium trended up to 130 . Diuresing well with lasix IV .   2/15 started on salt tablets 1g BID and lasix drip 10mg/h x 6h  2/16 continue Lasix 40 IVP x 1 with Lasix drip 10mg/hr x 6   2/17 sodium 134

## 2023-02-17 NOTE — PT/OT/SLP PROGRESS
Physical Therapy Treatment  -cotx with OT    Patient Name:  Rachel Zazueta   MRN:  [id number]    Recommendations:     Discharge Recommendations: nursing facility, skilled  Discharge Equipment Recommendations: hospital bed, lift device, wheelchair  Barriers to discharge: Inaccessible home, Decreased caregiver support, and impaired functional mobility requiring increased assistance    Assessment:     Rachel Zazueta is a 42 y.o. female admitted with a medical diagnosis of Weakness of both lower extremities.  She presents with the following impairments/functional limitations: weakness, impaired endurance, impaired self care skills, impaired functional mobility, gait instability, impaired balance, decreased coordination, decreased upper extremity function, decreased lower extremity function, decreased safety awareness, pain, orthopedic precautions, edema.    Pt tolerates session well with focus on bed mobility, transfers, therex, and EOB balance/endurance. Pt progressing well with significant improvement in sitting balance, posture at EOB, and pt able to advance to full upright stand this day. Pt continues to require significant physical assistance to mobilize but does demonstrate trending improvement. Pt progressing slowly towards therapy goals. Pt will continue to benefit from therapy services to address impairments listed above.     Rehab Prognosis: Good; patient would benefit from acute skilled PT services to address these deficits and reach maximum level of function.    Recent Surgery: Procedure(s) (LRB):  LAMINECTOMY, SPINE, THORACIC, WITH FUSION (T4-Pelvis fusion w/ T9-10 corpectomies) **AIRO (N/A) 15 Days Post-Op    Plan:     During this hospitalization, patient to be seen 3 x/week to address the identified rehab impairments via therapeutic activities, therapeutic exercises, neuromuscular re-education and progress toward the following goals:    Plan of Care Expires:  03/05/23    Subjective     Chief  "Complaint: pain/anxiety  Patient/Family Comments/goals: "I'm very anxious about doing this. I don't think I can do this."  Pain/Comfort:  Pain Rating 1: other (see comments) (unrated; states "a little")  Location - Orientation 1: generalized  Location 1: back  Pain Addressed 1: Reposition, Distraction, Pre-medicate for activity  Pain Rating Post-Intervention 1: other (see comments) (unrated; pt does no appear in distress on exit)      Objective:     Communicated with RN prior to session.  Patient found  supine, HOB minimally elevated  with telemetry, PureWick, MIHIR drain upon PTA entry to room.     General Precautions: Standard, fall, contact  Orthopedic Precautions: spinal precautions  Braces: TLSO  Respiratory Status: Room air     Functional Mobility:  Bed Mobility:     Rolling Left:  maximal assistance  Supine to Sit: maximal assistance and of 2 persons  Sit to Supine: maximal assistance and of 2 persons  Transfers:     Sit to Stand:  maximal assistance and of 2 persons with hand-held assist; x 2 attempts, 1 successful stand fully upright, B knee blocking and assist to maintain hip extension required.   Gait: unable to advance to gait d/t limited standing endurance and assistance needed to maintain stance      AM-PAC 6 CLICK MOBILITY  Turning over in bed (including adjusting bedclothes, sheets and blankets)?: 2  Sitting down on and standing up from a chair with arms (e.g., wheelchair, bedside commode, etc.): 2  Moving from lying on back to sitting on the side of the bed?: 2  Moving to and from a bed to a chair (including a wheelchair)?: 1  Need to walk in hospital room?: 1  Climbing 3-5 steps with a railing?: 1  Basic Mobility Total Score: 9       Treatment & Education:  BLE seated therex: LAQ and Hip Flexion x 10 reps each, AAROM as needed, restricted ROM d/t edema  Pt assisted at trunk with cues for core/trunk activation for upright posture and midline balance. Additional cues for anterior weight shift as pt with " tendency for posterior lean. SBA to Mod A dependent on level of UE support available.   Increased time spent in encouragement and therapeutic listening to improve patients comfort and participation in therapy interventions.     Patient left HOB elevated with all lines intact, call button in reach, and RN notified pt beginning to pass bowels and will require pericare shortly.    GOALS:   Multidisciplinary Problems       Physical Therapy Goals          Problem: Physical Therapy    Goal Priority Disciplines Outcome Goal Variances Interventions   Physical Therapy Goal     PT, PT/OT Ongoing, Progressing     Description: Goals to be met by: 23, extended goals to      Patient will increase functional independence with mobility by performin. Supine to sit with Moderate Assistance  2. Sit to supine with Moderate Assistance  3. Rolling to Left and Right with Moderate Assistance.  4. Sit to stand transfer with Maximum Assistance  5. Sit edge of bed with ANTONIO UE support for 8 min with contact guard assist to prepare for functional activities in sitting.                          Time Tracking:     PT Received On: 23  PT Start Time: 928     PT Stop Time: 1010  PT Total Time (min): 42 min     Billable Minutes: Therapeutic Activity 15, Therapeutic Exercise 15, and Neuromuscular Re-education 12    Treatment Type: Treatment  PT/PTA: PTA     PTA Visit Number: 1     2023

## 2023-02-18 LAB
ALBUMIN SERPL BCP-MCNC: 2 G/DL (ref 3.5–5.2)
ALP SERPL-CCNC: 151 U/L (ref 55–135)
ALT SERPL W/O P-5'-P-CCNC: 30 U/L (ref 10–44)
ANION GAP SERPL CALC-SCNC: 5 MMOL/L (ref 8–16)
AST SERPL-CCNC: 46 U/L (ref 10–40)
BACTERIA SPEC AEROBE CULT: NO GROWTH
BASOPHILS # BLD AUTO: 0.03 K/UL (ref 0–0.2)
BASOPHILS NFR BLD: 0.9 % (ref 0–1.9)
BILIRUB SERPL-MCNC: 1.3 MG/DL (ref 0.1–1)
BUN SERPL-MCNC: 25 MG/DL (ref 6–20)
CALCIUM SERPL-MCNC: 7.7 MG/DL (ref 8.7–10.5)
CHLORIDE SERPL-SCNC: 95 MMOL/L (ref 95–110)
CO2 SERPL-SCNC: 31 MMOL/L (ref 23–29)
CREAT SERPL-MCNC: 0.6 MG/DL (ref 0.5–1.4)
DIFFERENTIAL METHOD: ABNORMAL
EOSINOPHIL # BLD AUTO: 0.2 K/UL (ref 0–0.5)
EOSINOPHIL NFR BLD: 6.4 % (ref 0–8)
ERYTHROCYTE [DISTWIDTH] IN BLOOD BY AUTOMATED COUNT: 18.3 % (ref 11.5–14.5)
EST. GFR  (NO RACE VARIABLE): >60 ML/MIN/1.73 M^2
GLUCOSE SERPL-MCNC: 91 MG/DL (ref 70–110)
HCT VFR BLD AUTO: 27.2 % (ref 37–48.5)
HGB BLD-MCNC: 8.2 G/DL (ref 12–16)
IMM GRANULOCYTES # BLD AUTO: 0.01 K/UL (ref 0–0.04)
IMM GRANULOCYTES NFR BLD AUTO: 0.3 % (ref 0–0.5)
LYMPHOCYTES # BLD AUTO: 0.6 K/UL (ref 1–4.8)
LYMPHOCYTES NFR BLD: 16.5 % (ref 18–48)
MAGNESIUM SERPL-MCNC: 1.9 MG/DL (ref 1.6–2.6)
MCH RBC QN AUTO: 28.1 PG (ref 27–31)
MCHC RBC AUTO-ENTMCNC: 30.1 G/DL (ref 32–36)
MCV RBC AUTO: 93 FL (ref 82–98)
MONOCYTES # BLD AUTO: 0.7 K/UL (ref 0.3–1)
MONOCYTES NFR BLD: 19.4 % (ref 4–15)
NEUTROPHILS # BLD AUTO: 2 K/UL (ref 1.8–7.7)
NEUTROPHILS NFR BLD: 56.5 % (ref 38–73)
NRBC BLD-RTO: 0 /100 WBC
PHOSPHATE SERPL-MCNC: 3.6 MG/DL (ref 2.7–4.5)
PLATELET # BLD AUTO: 107 K/UL (ref 150–450)
PMV BLD AUTO: 9.9 FL (ref 9.2–12.9)
POTASSIUM SERPL-SCNC: 3.7 MMOL/L (ref 3.5–5.1)
PROT SERPL-MCNC: 5.1 G/DL (ref 6–8.4)
RBC # BLD AUTO: 2.92 M/UL (ref 4–5.4)
SODIUM SERPL-SCNC: 131 MMOL/L (ref 136–145)
WBC # BLD AUTO: 3.45 K/UL (ref 3.9–12.7)

## 2023-02-18 PROCEDURE — 63600175 PHARM REV CODE 636 W HCPCS

## 2023-02-18 PROCEDURE — 25000003 PHARM REV CODE 250: Performed by: PSYCHIATRY & NEUROLOGY

## 2023-02-18 PROCEDURE — 25000003 PHARM REV CODE 250: Performed by: HOSPITALIST

## 2023-02-18 PROCEDURE — 80053 COMPREHEN METABOLIC PANEL: CPT | Performed by: STUDENT IN AN ORGANIZED HEALTH CARE EDUCATION/TRAINING PROGRAM

## 2023-02-18 PROCEDURE — 27000207 HC ISOLATION

## 2023-02-18 PROCEDURE — 25000003 PHARM REV CODE 250: Performed by: STUDENT IN AN ORGANIZED HEALTH CARE EDUCATION/TRAINING PROGRAM

## 2023-02-18 PROCEDURE — 84100 ASSAY OF PHOSPHORUS: CPT | Performed by: STUDENT IN AN ORGANIZED HEALTH CARE EDUCATION/TRAINING PROGRAM

## 2023-02-18 PROCEDURE — 25000003 PHARM REV CODE 250

## 2023-02-18 PROCEDURE — 25000003 PHARM REV CODE 250: Performed by: INTERNAL MEDICINE

## 2023-02-18 PROCEDURE — 83735 ASSAY OF MAGNESIUM: CPT | Performed by: STUDENT IN AN ORGANIZED HEALTH CARE EDUCATION/TRAINING PROGRAM

## 2023-02-18 PROCEDURE — 99233 SBSQ HOSP IP/OBS HIGH 50: CPT | Mod: ,,, | Performed by: INTERNAL MEDICINE

## 2023-02-18 PROCEDURE — 20600001 HC STEP DOWN PRIVATE ROOM

## 2023-02-18 PROCEDURE — 85025 COMPLETE CBC W/AUTO DIFF WBC: CPT

## 2023-02-18 PROCEDURE — 99233 PR SUBSEQUENT HOSPITAL CARE,LEVL III: ICD-10-PCS | Mod: ,,, | Performed by: INTERNAL MEDICINE

## 2023-02-18 PROCEDURE — 63600175 PHARM REV CODE 636 W HCPCS: Performed by: STUDENT IN AN ORGANIZED HEALTH CARE EDUCATION/TRAINING PROGRAM

## 2023-02-18 PROCEDURE — A4216 STERILE WATER/SALINE, 10 ML: HCPCS | Performed by: PSYCHIATRY & NEUROLOGY

## 2023-02-18 PROCEDURE — 63600175 PHARM REV CODE 636 W HCPCS: Performed by: NURSE PRACTITIONER

## 2023-02-18 RX ADMIN — METHOCARBAMOL 1000 MG: 500 TABLET ORAL at 06:02

## 2023-02-18 RX ADMIN — Medication 10 ML: at 06:02

## 2023-02-18 RX ADMIN — METHOCARBAMOL 1000 MG: 500 TABLET ORAL at 05:02

## 2023-02-18 RX ADMIN — OXYCODONE HYDROCHLORIDE 10 MG: 10 TABLET ORAL at 01:02

## 2023-02-18 RX ADMIN — SPIRONOLACTONE 100 MG: 100 TABLET, FILM COATED ORAL at 10:02

## 2023-02-18 RX ADMIN — METHOCARBAMOL 1000 MG: 500 TABLET ORAL at 01:02

## 2023-02-18 RX ADMIN — MEROPENEM 2 G: 1 INJECTION INTRAVENOUS at 05:02

## 2023-02-18 RX ADMIN — DULOXETINE 30 MG: 30 CAPSULE, DELAYED RELEASE ORAL at 10:02

## 2023-02-18 RX ADMIN — HEPARIN SODIUM 5000 UNITS: 5000 INJECTION INTRAVENOUS; SUBCUTANEOUS at 02:02

## 2023-02-18 RX ADMIN — GABAPENTIN 900 MG: 300 CAPSULE ORAL at 10:02

## 2023-02-18 RX ADMIN — Medication 10 ML: at 12:02

## 2023-02-18 RX ADMIN — MEROPENEM 2 G: 1 INJECTION INTRAVENOUS at 10:02

## 2023-02-18 RX ADMIN — GABAPENTIN 900 MG: 300 CAPSULE ORAL at 02:02

## 2023-02-18 RX ADMIN — BUPROPION HYDROCHLORIDE 150 MG: 150 TABLET, FILM COATED, EXTENDED RELEASE ORAL at 10:02

## 2023-02-18 RX ADMIN — OXYCODONE HYDROCHLORIDE 10 MG: 10 TABLET ORAL at 02:02

## 2023-02-18 RX ADMIN — METHOCARBAMOL 1000 MG: 500 TABLET ORAL at 12:02

## 2023-02-18 RX ADMIN — HYDROMORPHONE HYDROCHLORIDE 0.5 MG: 1 INJECTION, SOLUTION INTRAMUSCULAR; INTRAVENOUS; SUBCUTANEOUS at 10:02

## 2023-02-18 RX ADMIN — PANTOPRAZOLE SODIUM 40 MG: 40 TABLET, DELAYED RELEASE ORAL at 10:02

## 2023-02-18 RX ADMIN — DRONABINOL 2.5 MG: 2.5 CAPSULE ORAL at 10:02

## 2023-02-18 RX ADMIN — HEPARIN SODIUM 5000 UNITS: 5000 INJECTION INTRAVENOUS; SUBCUTANEOUS at 06:02

## 2023-02-18 RX ADMIN — MEROPENEM 2 G: 1 INJECTION INTRAVENOUS at 01:02

## 2023-02-18 NOTE — ASSESSMENT & PLAN NOTE
42 year old woman s/p multiple spinal surgeries presented to OSH with possible infection of shoulder. Found to have UTI and E. Coli bacteremia and progressively worse BLE weakness. [place] [facility]. Underwent Laminectomy T8-T10; Posterior spinal fusion T8-T12; hardware removal and washout on 1/24. Admitted to [facility] postop. On 2/2 underwent T4-pelvis fusion and T9-T10 corpectomies. To complete 8 week course of meropenem per ID for ESBL E coli from bone culture, end date 3/29.     - neuro checks q4h  - HV drains x 2 in place, management per NSGY  - meropenem for ESBL bacteremia per ID, eot 3/29/23  - Plastic surgery follow, wound vac discontinued  - CMP, Mag, Phos, CBC daily  - MAP goals >65, SBP < 180  - PRN labetalol, hydralazine  - MM pain regimen: PRN Dilaudid, Tylenol, gabapentin, robaxin, PRN oxycodone  - Aggressive bowel regimen  - PT/OT   2/9   On meropenem for MDR-ESCHERICHIA COLI ESBL  sodium trended down to 129.  Neurosurgery following post OR and aiding in pain management. PCA discontinued 2/8 . On oxycodone 10mg PO q4h . requiring IV dilaudid q4 as needed,. drains to gravity output 990ml/24h . Plastic surgery following flap, managing wounds.  Wound VAC discontinued on Wednesday.  accepted to [facility]   2/11 needs post op xrays when able to stand or sit, prior to discharge  2/15  CT abdomen done overnight as well - Postop change in the spine.  There is lucency about the bi iliac screws concerning for loosening.  Bony destruction T10 with interbody strut. neurosurgery f/u

## 2023-02-18 NOTE — ASSESSMENT & PLAN NOTE
MELD-Na score: 10 at 2/18/2023  6:34 AM  MELD score: 10 at 2/18/2023  6:34 AM  Calculated from:  Serum Creatinine: 0.6 mg/dL (Using min of 1 mg/dL) at 2/18/2023  6:34 AM  Serum Sodium: 131 mmol/L at 2/18/2023  6:34 AM  Total Bilirubin: 1.3 mg/dL at 2/18/2023  6:34 AM  INR(ratio): 1.3 at 2/17/2023  7:42 AM  Age: 42 years      Patient has reportedly refused transplant evaluation in the past.   - Daily CMP and trend LFTs  - Continue Lactulose 20 g TID  - Lasix 40 mg PO BID  - Will need Hepatology follow up outpatient  2/10   ammonia at 57 . sono abdomen  . tylenol changed to 1000mg q12h with cirrhosis . s/p IR paracentesis today   2/11no evidence of SBP on paracentesis. 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 AFB and Triglyerides on ascitic fluid.    2/13 s/p paracentesis. Removed 4,700 ml. DVT sonogram LE negative.2/13 s/p paracentesis. Removed 4,700 ml. DVT sonogram LE negative. s/p psychiatry eval  for anxiety/depression. xanax discontinued. Decreased Bupropion to 150 mg PO Daily . Started Duloxetine 30 mg PO BID and  PRN Hydroxyzine 25 mg PO q8h for anxiety

## 2023-02-18 NOTE — PROGRESS NOTES
Plastic and Reconstructive Surgery   Progress Note    Subjective:    No acute events, afebrile. Drains overall are decreasing from previously very high output.  Pain is controlled.      Objective:  Vital signs in last 24 hours:  Temp:  [97.9 °F (36.6 °C)-99.4 °F (37.4 °C)] 99.4 °F (37.4 °C)  Pulse:  [] 97  Resp:  [16-20] 18  SpO2:  [95 %-99 %] 99 %  BP: ()/(43-96) 113/59    Intake/Output last 3 shifts:  I/O last 3 completed shifts:  In: 350 [P.O.:350]  Out: 3345 [Urine:2950; Drains:395]    Intake/Output this shift:  I/O this shift:  In: -   Out: 900 [Urine:900]        Physical Exam:  VITAL SIGNS:   Vitals:    23 0330 23 0743 23 0806 23 1002   BP:  (!) 113/59     BP Location:       Patient Position:       Pulse: 101 99 97    Resp:  18  18   Temp:  99.4 °F (37.4 °C)     TempSrc:       SpO2:  99%     Weight:       Height:         TMAX: Temp (24hrs), Av.4 °F (36.9 °C), Min:97.9 °F (36.6 °C), Max:99.4 °F (37.4 °C)    General: Alert; No acute distress  Cardiovascular: Regular rate   Respiratory: Normal respiratory effort. Chest rise symmetric.   Abdomen: Soft, nontender, nondistended  Extremity: Moves all extremities equally.  Neurologic: No focal deficit. Speech normal  Spine incision is intact. Staples still in place.no signs of infection      Scheduled Medications acetaminophen, 1,000 mg, Q12H  bisacodyL, 10 mg, Every other day  buPROPion, 150 mg, Daily  dronabinoL, 2.5 mg, BID  DULoxetine, 30 mg, BID  gabapentin, 900 mg, TID  heparin (porcine), 5,000 Units, Q8H  lactulose, 20 g, TID  meropenem (MERREM) IVPB, 2 g, Q8H  methocarbamoL, 1,000 mg, Q6H  pantoprazole, 40 mg, Daily  sodium chloride 0.9%, 10 mL, Q6H  spironolactone, 100 mg, Daily      PRN Medications sodium chloride, sodium chloride, sodium chloride, dextrose 10%, dextrose 10%, diphenhydrAMINE, glucagon (human recombinant), glucose, glucose, hydrALAZINE, HYDROmorphone, hydrOXYzine HCL, magnesium hydroxide 400 mg/5 ml,  melatonin, naloxone, ondansetron, oxyCODONE **AND** oxyCODONE, polyethylene glycol, predniSONE, prochlorperazine, senna-docusate 8.6-50 mg, simethicone, sodium chloride 0.9%, Flushing PICC Protocol **AND** sodium chloride 0.9% **AND** sodium chloride 0.9%    Recent Labs:   Lab Results   Component Value Date    WBC 3.45 (L) 02/18/2023    HGB 8.2 (L) 02/18/2023    HCT 27.2 (L) 02/18/2023    MCV 93 02/18/2023     (L) 02/18/2023     Lab Results   Component Value Date    GLU 91 02/18/2023     (L) 02/18/2023    K 3.7 02/18/2023    CL 95 02/18/2023    BUN 25 (H) 02/18/2023         Assessment: 42 y.o. y/o female 16 Days Post-Op s/p Procedure(s):  LAMINECTOMY, SPINE, THORACIC, WITH FUSION (T4-Pelvis fusion w/ T9-10 corpectomies) **AIRO Doing well postoperatively.  Drains with SSO , decreasing output    Plan  -Continue to monitor drain output  -Staples to be removed starting this week.  -Rest of care per primary

## 2023-02-18 NOTE — CLINICAL REVIEW
Nephrology Chart Review      Intake/Output Summary (Last 24 hours) at 2/18/2023 1222  Last data filed at 2/18/2023 0745  Gross per 24 hour   Intake 350 ml   Output 2615 ml   Net -2265 ml       Vitals:    02/18/23 0330 02/18/23 0743 02/18/23 0806 02/18/23 1002   BP:  (!) 113/59     BP Location:       Patient Position:       Pulse: 101 99 97    Resp:  18  18   Temp:  99.4 °F (37.4 °C)     TempSrc:       SpO2:  99%     Weight:       Height:           Recent Labs   Lab 02/16/23  0746 02/16/23  1238 02/17/23  0736 02/18/23  0634   * 131* 134* 131*   K 3.4* 3.2* 3.7 3.7   CL 97 97 98 95   CO2 28 27 30* 31*   BUN 31* 30* 24* 25*   CREATININE 0.6 0.6 0.6 0.6   CALCIUM 7.7* 7.5* 7.8* 7.7*   PHOS 3.4  --  3.6 3.6       Would increase lasix to 10  Encourage PO fluid intake    No other related issues identified. Please call Nephrology as needed; We will continue to follow.      Spencer Khan MD  PGY-4 Nephrology

## 2023-02-18 NOTE — ASSESSMENT & PLAN NOTE
- Patient with sodium   Recent Labs   Lab 02/16/23  1238 02/17/23  0736 02/18/23  0634   * 134* 131*   . sodium trending down    2/9 monitor on lasix BID.sodium trended down to 127.   2/10 sodium stable at 127 . continues with increased drain output 985ml/24h . negative balance of  2.8 L lasix on hold.  nephrology consulted.  Nephrology recs  fluid restriction to 1 L,  lasix 60 mg IV daily,  and trend sodium q12h.  diazepam discontinued   2/11  noncompliant with  fluid restriction to 1 L despite counseling. sodium trended down to 126 .received 2 doses of IV lasix 60mg. Increased Lasix IV 80 BID  due to UOP 500ml/24h   2/12 UOP of 1250ml/24h.sodium trended up to 129   2/14 sodium trended up to 130 . Diuresing well with lasix IV .   2/15 started on salt tablets 1g BID and lasix drip 10mg/h x 6h  2/16 continue Lasix 40 IVP x 1 with Lasix drip 10mg/hr x 6   2/17 sodium 134  2/18 sodium 131. On lasix drip . Increased from 5 to 10

## 2023-02-18 NOTE — PROGRESS NOTES
[facility] - Telemetry [facility] Medicine  Progress Note    Patient Name: Rachel Zazueta  MRN: [id number]  Patient Class: IP- Inpatient   Admission Date: 1/19/2023  Length of Stay: 30 days  Attending Physician: Joaquin Bahena MD  Primary Care Provider: Dannielle Merino DO        Subjective:     Principal Problem:Weakness of both lower extremities        HPI:  Ms. Zazueta is a 42 y.o F w/ hx ofIV drug use (methamphetamine), chronic HCV with cirrhosis, spinal fusion (04/2021), epidural abscess with removal of hardware (02/2022), spinal fusion with hardware (T10 - Pelvis / 03/2022), obesity (BMI 39.3) and neurogenic bladder who initially presented to [facility] for evaluation of back pain and left leg weakness.  She reports initially noting the left leg weakness when she was about to go to the bathroom and was about to fall but was assisted by her boyfriend.  She was brought to the ED via EMS.  Urinalysis with UTI concerns and blood cultures at OSH grew ESBL E coli so she was treated with meropenem.  During hospitalization, she reports the weakness that started out in her left leg initially then spread upwards to her left thigh, left hip, then crossed over to the right hip and spread to her right leg.  Denies recent IV drug use. She reports her last incidence of drug use was more than 3 years ago and also denies tobacco, and alcohol abuse.  Reports cannabis use.    She also reports more than 10 years ago she had an episode of a headache that lasted about 4 days and was associated with residual defects of a strabismus in the left eye with associated visual disturbances.  She also reports over the past few years her friends have noticed that she sometimes has word-finding difficulty during conversations.     Reports shortness of breath when sitting up, fever, chills, back pain, lower extremity weakness(initally L>R, but now R>L), diarrhea.  Denies cough, nausea, vomiting, constipation, bladder or  bowel incontinence, dysuria, dark urine, new vision changes.    OSH course notable for concerning urinalysis and blood cultures positive for ESBL E coli treated with meropenem.  She was also admit to the ICU where she was treated with Precedex for significant body aches, irritability, and muscle spasms.  Concerns of a murmur on physical exam so she underwent TTE which did not show any vegetations.  Worsening lower extremity weakness workup with MRI head nonspecific, MRI cervical spine with multilevel spondylosis, X-ray spine with multilevel thoracolumbar fusion and diskectomy, focal kyphosis at T9-10 increased compared to prior.  She was started on prophylaxis amoxicillin due to her history of infected hardware.  MRI spine unable to be completed due to patient's body habitus so she was transferred [place] [facility] [place] and Neurosurgery, Neurology evaluation.      Overview/Hospital Course:  Pt admitted as a transfer from [facility] for worsening LE weakness, concern for spinal infection, and need for MRI which could not be done at OSH. Imaging was completed here. Worsening proximal junctional kyphosis noted at T9-10 noted with concern for discitis at T8-9, T9-10. NSGY evaluated.     Found to have T8-T10 kyphosis on MRI. 1/24 underwent laminectomy T8-T10; posterior fusion T8-T12; and washout. 2/2 underwent T4-pelvis fusion and T9-T10 corpectomy.  ID consulted for thoracic discitis infection.  Patient to complete 8 weeks of therapy with meropenem. Patient underwent flap closure with Plastic surgery.  Step-down Hospital Medicine on 02/08.    2/9   On meropenem for MDR-ESCHERICHIA COLI ESBL  sodium trended down to 129.  Neurosurgery following post OR and aiding in pain management. PCA discontinued 2/8 . On oxycodone 10mg PO q4h . requiring IV dilaudid q4 as needed,. drains to gravity output 990ml/24h . Plastic surgery following flap, managing wounds.  Wound VAC discontinued on Wednesday.  accepted to [facility] pain  management consulted for back ache- switched to multimodal therapy. sodium trended down to 127.   2/10 sodium stable at 127 . continues with increased drain output 985ml/24h . negative balance of  2.8 L lasix on hold.  nephrology consulted. abdominal X ray -  Nonobstructive bowel gas pattern.  No free air.  As before, mild gaseous distension of stomach. ammonia at 57 . sono abdomen  . tylenol changed to 1000mg q12h with cirrhosis . sono abdomen -Cirrhotic liver morphology.  Sequela of portal hypertension including moderate ascites, recanalized umbilical vein.  No focal hepatic lesions. Cholecystectomy. . Nephrology recs  fluid restriction to 1 L,  lasix 60 mg IV daily,  and trend sodium q12h.  diazepam discontinued . s/p IR paracentesis today   2/11no evidence of SBP on paracentesis. 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 AFB and Triglyerides on ascitic fluid.  noncompliant with  fluid restriction to 1 L despite counseling. sodium trended down to 126 .received 2 doses of IV lasix 60mg. drain output 250ml. Increased Lasix IV 80 BID  due to UOP 500ml/24h   2/12 UOP of 1250ml/24h. K and P replaced. sodium trended up to 129.  hepatology eval -If ascitic fluid triglyceride level is elevated, needs IR  eval for lymphangiogram and surgery eval for  lymphatic repair. xanax 0.25mg BID PRN for anxiety   2/13 s/p paracentesis. Removed 4,700 ml. DVT sonogram LE negative. s/p psychiatry eval  for anxiety/depression. xanax discontinued. Decreased Bupropion to 150 mg PO Daily . Started Duloxetine 30 mg PO BID and  PRN Hydroxyzine 25 mg PO q8h for anxiety  2/14 Hb 6.9 Transfusion with 1 unit of PRBC . FOBT. sodium trended up to 130 . Diuresing well with lasix IV . DVT sonogram - Nonspecific fluid collection adjacent to the left iliac vein.  Recommend further evaluation with contrast enhanced CT scan . has Iodine contrast allergy- . 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  Diuresing well on IV lasix BID. negative balance of  13.6 L . IR eval -repeat paracentesis to trend volume output and  conservative management   with a low fat diet, octreotide, diuretics. Neurosurgery for input regarding chylous ascitis as possible neurosurgical complication or not. CT abdomen done overnight as well - Postop change in the spine.  There is lucency about the bi iliac screws concerning for loosening.  Bony destruction T10 with interbody strut. started on salt tablets 1g BID x 1day and lasix drip 10mg/h x 6h .  spironolactone  increased to 100 mg Qday. Continue IV Lasix. Dietary consulted for  high-protein and low-fat diet with medium-chain triglycerides  diet.  paracentesis 6.6L removed   2/16 Continuing gentle IV lasix with assistance from nephrology, remains fluid overloaded  2/17: continue Lasix infusion, responding well      Interval History: No events overnight. . She denies pain. Diuresing well. Lasix drip increased to 10     Review of Systems   Constitutional:  Negative for chills and fever.   Respiratory:  Negative for chest tightness and shortness of breath.    Cardiovascular:  Positive for leg swelling. Negative for chest pain.   Gastrointestinal:  Positive for abdominal distention. Negative for abdominal pain and nausea.   Neurological:  Negative for dizziness and weakness.   Objective:     Vital Signs (Most Recent):  Temp: 97.6 °F (36.4 °C) (02/18/23 1231)  Pulse: 98 (02/18/23 1231)  Resp: 18 (02/18/23 1231)  BP: (!) 93/54 (02/18/23 1231)  SpO2: 98 % (02/18/23 1231)   Vital Signs (24h Range):  Temp:  [97.6 °F (36.4 °C)-99.4 °F (37.4 °C)] 97.6 °F (36.4 °C)  Pulse:  [] 98  Resp:  [16-20] 18  SpO2:  [95 %-99 %] 98 %  BP: ()/(43-96) 93/54     Weight: (!) 140.7 kg (310 lb 3 oz)  Body mass index is 48.58  kg/m².    Intake/Output Summary (Last 24 hours) at 2/18/2023 1322  Last data filed at 2/18/2023 0745  Gross per 24 hour   Intake 350 ml   Output 2615 ml   Net -2265 ml        Physical Exam  Vitals and nursing note reviewed.   Constitutional:       Appearance: She is well-developed.   Eyes:      Pupils: Pupils are equal, round, and reactive to light.   Cardiovascular:      Rate and Rhythm: Normal rate and regular rhythm.   Pulmonary:      Effort: Pulmonary effort is normal.      Breath sounds: Normal breath sounds.   Abdominal:      General: There is distension.      Palpations: Abdomen is soft.      Tenderness: There is no abdominal tenderness.      Comments: 4 drains in place with serosanguinous fluid   Musculoskeletal:         General: No tenderness.      Right lower leg: Edema present.      Left lower leg: Edema present.   Skin:     General: Skin is warm and dry.   Neurological:      Mental Status: She is alert and oriented to person, place, and time.   Psychiatric:         Behavior: Behavior normal.       Significant Labs: All pertinent labs within the past 24 hours have been reviewed.    Significant Imaging: I have reviewed all pertinent imaging results/findings within the past 24 hours.      Assessment/Plan:      * Weakness of both lower extremities  42 year old woman s/p multiple spinal surgeries presented to OSH with possible infection of shoulder. Found to have UTI and E. Coli bacteremia and progressively worse BLE weakness. Transferred [place] [facility] [place]surgical evaluation. Underwent Laminectomy T8-T10; Posterior spinal fusion T8-T12; hardware removal and washout on 1/24. Admitted to [facility] postop. On 2/2 underwent T4-pelvis fusion and T9-T10 corpectomies. To complete 8 week course of meropenem per ID for ESBL E coli from bone culture, end date 3/29.     - neuro checks q4h  - HV drains x 2 in place, management per NSGY  - meropenem for ESBL bacteremia per ID, eot 3/29/23  - Plastic surgery follow, wound vac  discontinued  - CMP, Mag, Phos, CBC daily  - MAP goals >65, SBP < 180  - PRN labetalol, hydralazine  - MM pain regimen: PRN Dilaudid, Tylenol, gabapentin, robaxin, PRN oxycodone  - Aggressive bowel regimen  - PT/OT   2/9   On meropenem for MDR-ESCHERICHIA COLI ESBL  sodium trended down to 129.  Neurosurgery following post OR and aiding in pain management. PCA discontinued 2/8 . On oxycodone 10mg PO q4h . requiring IV dilaudid q4 as needed,. drains to gravity output 990ml/24h . Plastic surgery following flap, managing wounds.  Wound VAC discontinued on Wednesday.  accepted to [facility]   2/11 needs post op xrays when able to stand or sit, prior to discharge  2/15  CT abdomen done overnight as well - Postop change in the spine.  There is lucency about the bi iliac screws concerning for loosening.  Bony destruction T10 with interbody strut. 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  IR consulted  for lymphangiogram and  eval for  lymphatic repair  2/15  IR eval -repeat paracentesis to trend volume output and  conservative management    spironolactone  increased to 100 mg Qday. Continue IV Lasix. Dietary consulted for  high-protein and low-fat diet with medium-chain triglycerides  diet.     Ascites due to alcoholic cirrhosis  2/10  sono abdomen -Cirrhotic liver morphology.  Sequela of portal hypertension including moderate ascites, recanalized umbilical vein.  No focal hepatic lesions. Cholecystectomy. s/p IR paracentesis as above  2/11no evidence of SBP on paracentesis. 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 AFB and Triglyerides on ascitic fluid.    2/12  hepatology eval -If ascitic fluid triglyceride level is elevated, needs IR  eval for lymphangiogram and surgery eval for  lymphatic repair    Abdominal pain  2/10  abdominal X ray -  Nonobstructive bowel gas pattern.  No free air.  As  before, mild gaseous distension of stomach. ammonia at 57 . sono abdomen with ascitis . simethicone PRN  2/11no evidence of SBP on paracentesis. 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 AFB and Triglyerides on ascitic fluid.      Hyponatremia  - Patient with sodium   Recent Labs   Lab 02/16/23  1238 02/17/23  0736 02/18/23  0634   * 134* 131*   . sodium trending down    2/9 monitor on lasix BID.sodium trended down to 127.   2/10 sodium stable at 127 . continues with increased drain output 985ml/24h . negative balance of  2.8 L lasix on hold.  nephrology consulted.  Nephrology recs  fluid restriction to 1 L,  lasix 60 mg IV daily,  and trend sodium q12h.  diazepam discontinued   2/11  noncompliant with  fluid restriction to 1 L despite counseling. sodium trended down to 126 .received 2 doses of IV lasix 60mg. Increased Lasix IV 80 BID  due to UOP 500ml/24h   2/12 UOP of 1250ml/24h.sodium trended up to 129   2/14 sodium trended up to 130 . Diuresing well with lasix IV .   2/15 started on salt tablets 1g BID and lasix drip 10mg/h x 6h  2/16 continue Lasix 40 IVP x 1 with Lasix drip 10mg/hr x 6   2/17 sodium 134  2/18 sodium 131. On lasix drip . Increased from 5 to 10    Acute blood loss anemia  Hb 6.4 on 2/6, received 1U pRBCs. Hb 2/7 WNL.  - monitor Hb  - transfuse to maintain Hb >7  2/9    Patient's with Normocytic anemia.. Hemoglobin stable. Etiology likely due to chronic disease .  Current CBC reviewed-    Recent Labs   Lab 02/16/23  0746 02/17/23  0736 02/18/23  0634   HGB 8.2* 8.1* 8.2*    Monitor CBC and transfuse if H/H drops below 7/21.   2/14 Hb 6.9 Transfusion with 1 unit of PRBC . FOBT negative  2/17: hgb 8.1, stable  2/18: hbg 8.2 stable     Kyphosis of thoracolumbar region  See weakness    Thoracic discitis  See Weakness of both lower extremities    Anxiety and depression  Evaluated by Psychiatry at OSH prior to transfer. Recommended  increasing bupropion.  - Continue bupropion 300 mg QD  - Gabapentin 600 mg TID for anxiety and neuropathy  - PRN diazepam 5 mg q6h discontinued  2/13   s/p psychiatry eval  for anxiety/depression. xanax discontinued. Decreased Bupropion to 150 mg PO Daily . Started Duloxetine 30 mg PO BID and  PRN Hydroxyzine 25 mg PO q8h for anxiety    Severe obesity (BMI >= 40)  Body mass index is 48.58 kg/m². Morbid obesity complicates all aspects of disease management from diagnostic modalities to treatment. Weight loss encouraged    Substance use disorder  Denies IV drug use (meth) for past 3 years. Denies alcohol and tobacco abuse.     Chronic hepatitis C with cirrhosis  See above    Cirrhosis of liver with ascites  MELD-Na score: 10 at 2/18/2023  6:34 AM  MELD score: 10 at 2/18/2023  6:34 AM  Calculated from:  Serum Creatinine: 0.6 mg/dL (Using min of 1 mg/dL) at 2/18/2023  6:34 AM  Serum Sodium: 131 mmol/L at 2/18/2023  6:34 AM  Total Bilirubin: 1.3 mg/dL at 2/18/2023  6:34 AM  INR(ratio): 1.3 at 2/17/2023  7:42 AM  Age: 42 years      Patient has reportedly refused transplant evaluation in the past.   - Daily CMP and trend LFTs  - Continue Lactulose 20 g TID  - Lasix 40 mg PO BID  - Will need Hepatology follow up outpatient  2/10   ammonia at 57 . sono abdomen  . tylenol changed to 1000mg q12h with cirrhosis . s/p IR paracentesis today   2/11no evidence of SBP on paracentesis. 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 AFB and Triglyerides on ascitic fluid.    2/13 s/p paracentesis. Removed 4,700 ml. DVT sonogram LE negative.2/13 s/p paracentesis. Removed 4,700 ml. DVT sonogram LE negative. s/p psychiatry eval  for anxiety/depression. xanax discontinued. Decreased Bupropion to 150 mg PO Daily . Started Duloxetine 30 mg PO BID and  PRN Hydroxyzine 25 mg PO q8h for anxiety    Pancytopenia  - acute on chronic  - follows with hematology outpatient  - CBC daily      VTE Risk  Mitigation (From admission, onward)         Ordered     heparin (porcine) injection 5,000 Units  Every 8 hours         02/04/23 0848     IP VTE HIGH RISK PATIENT  Once         01/19/23 2153     Place sequential compression device  Until discontinued         01/19/23 2153     Reason for No Pharmacological VTE Prophylaxis  Once        Question:  Reasons:  Answer:  Physician Provided (leave comment)  Comment:  Potential NSGY procedure    01/19/23 2153                Discharge Planning   SHIRA: 2/20/2023     Code Status: Partial Code   Is the patient medically ready for discharge?: No    Reason for patient still in hospital (select all that apply): Treatment  Discharge Plan A: Long-term acute care facility (LTAC)   Discharge Delays: None known at this time              Joaquin Bahena MD  Department of Hospital Medicine   [facility] - Telemetry Stepdown

## 2023-02-18 NOTE — SUBJECTIVE & OBJECTIVE
Interval History: No events overnight. . She denies pain. Diuresing well. Lasix drip increased to 10     Review of Systems   Constitutional:  Negative for chills and fever.   Respiratory:  Negative for chest tightness and shortness of breath.    Cardiovascular:  Positive for leg swelling. Negative for chest pain.   Gastrointestinal:  Positive for abdominal distention. Negative for abdominal pain and nausea.   Neurological:  Negative for dizziness and weakness.   Objective:     Vital Signs (Most Recent):  Temp: 97.6 °F (36.4 °C) (02/18/23 1231)  Pulse: 98 (02/18/23 1231)  Resp: 18 (02/18/23 1231)  BP: (!) 93/54 (02/18/23 1231)  SpO2: 98 % (02/18/23 1231)   Vital Signs (24h Range):  Temp:  [97.6 °F (36.4 °C)-99.4 °F (37.4 °C)] 97.6 °F (36.4 °C)  Pulse:  [] 98  Resp:  [16-20] 18  SpO2:  [95 %-99 %] 98 %  BP: ()/(43-96) 93/54     Weight: (!) 140.7 kg (310 lb 3 oz)  Body mass index is 48.58 kg/m².    Intake/Output Summary (Last 24 hours) at 2/18/2023 1322  Last data filed at 2/18/2023 0745  Gross per 24 hour   Intake 350 ml   Output 2615 ml   Net -2265 ml        Physical Exam  Vitals and nursing note reviewed.   Constitutional:       Appearance: She is well-developed.   Eyes:      Pupils: Pupils are equal, round, and reactive to light.   Cardiovascular:      Rate and Rhythm: Normal rate and regular rhythm.   Pulmonary:      Effort: Pulmonary effort is normal.      Breath sounds: Normal breath sounds.   Abdominal:      General: There is distension.      Palpations: Abdomen is soft.      Tenderness: There is no abdominal tenderness.      Comments: 4 drains in place with serosanguinous fluid   Musculoskeletal:         General: No tenderness.      Right lower leg: Edema present.      Left lower leg: Edema present.   Skin:     General: Skin is warm and dry.   Neurological:      Mental Status: She is alert and oriented to person, place, and time.   Psychiatric:         Behavior: Behavior normal.       Significant  Labs: All pertinent labs within the past 24 hours have been reviewed.    Significant Imaging: I have reviewed all pertinent imaging results/findings within the past 24 hours.

## 2023-02-18 NOTE — ASSESSMENT & PLAN NOTE
Hb 6.4 on 2/6, received 1U pRBCs. Hb 2/7 WNL.  - monitor Hb  - transfuse to maintain Hb >7  2/9    Patient's with Normocytic anemia.. Hemoglobin stable. Etiology likely due to chronic disease .  Current CBC reviewed-    Recent Labs   Lab 02/16/23  0746 02/17/23  0736 02/18/23  0634   HGB 8.2* 8.1* 8.2*    Monitor CBC and transfuse if H/H drops below 7/21.   2/14 Hb 6.9 Transfusion with 1 unit of PRBC . FOBT negative  2/17: hgb 8.1, stable  2/18: hbg 8.2 stable

## 2023-02-19 PROBLEM — I95.9 HYPOTENSION: Status: ACTIVE | Noted: 2023-02-19

## 2023-02-19 LAB
ALBUMIN SERPL BCP-MCNC: 1.2 G/DL (ref 3.5–5.2)
ALBUMIN SERPL BCP-MCNC: 1.9 G/DL (ref 3.5–5.2)
ALP SERPL-CCNC: 100 U/L (ref 55–135)
ALP SERPL-CCNC: 151 U/L (ref 55–135)
ALT SERPL W/O P-5'-P-CCNC: 19 U/L (ref 10–44)
ALT SERPL W/O P-5'-P-CCNC: 30 U/L (ref 10–44)
ANION GAP SERPL CALC-SCNC: 4 MMOL/L (ref 8–16)
ANION GAP SERPL CALC-SCNC: 5 MMOL/L (ref 8–16)
ANION GAP SERPL CALC-SCNC: 8 MMOL/L (ref 8–16)
AST SERPL-CCNC: 30 U/L (ref 10–40)
AST SERPL-CCNC: 47 U/L (ref 10–40)
BASOPHILS # BLD AUTO: 0.02 K/UL (ref 0–0.2)
BASOPHILS # BLD AUTO: 0.02 K/UL (ref 0–0.2)
BASOPHILS # BLD AUTO: 0.03 K/UL (ref 0–0.2)
BASOPHILS NFR BLD: 0.7 % (ref 0–1.9)
BASOPHILS NFR BLD: 0.9 % (ref 0–1.9)
BASOPHILS NFR BLD: 0.9 % (ref 0–1.9)
BILIRUB SERPL-MCNC: 0.8 MG/DL (ref 0.1–1)
BILIRUB SERPL-MCNC: 1.2 MG/DL (ref 0.1–1)
BUN SERPL-MCNC: 16 MG/DL (ref 6–20)
BUN SERPL-MCNC: 20 MG/DL (ref 6–20)
BUN SERPL-MCNC: 22 MG/DL (ref 6–20)
CALCIUM SERPL-MCNC: 5.2 MG/DL (ref 8.7–10.5)
CALCIUM SERPL-MCNC: 7.7 MG/DL (ref 8.7–10.5)
CALCIUM SERPL-MCNC: 7.7 MG/DL (ref 8.7–10.5)
CHLORIDE SERPL-SCNC: 112 MMOL/L (ref 95–110)
CHLORIDE SERPL-SCNC: 93 MMOL/L (ref 95–110)
CHLORIDE SERPL-SCNC: 94 MMOL/L (ref 95–110)
CO2 SERPL-SCNC: 22 MMOL/L (ref 23–29)
CO2 SERPL-SCNC: 30 MMOL/L (ref 23–29)
CO2 SERPL-SCNC: 31 MMOL/L (ref 23–29)
CREAT SERPL-MCNC: 0.4 MG/DL (ref 0.5–1.4)
CREAT SERPL-MCNC: 0.5 MG/DL (ref 0.5–1.4)
CREAT SERPL-MCNC: 0.6 MG/DL (ref 0.5–1.4)
DIFFERENTIAL METHOD: ABNORMAL
EOSINOPHIL # BLD AUTO: 0.1 K/UL (ref 0–0.5)
EOSINOPHIL # BLD AUTO: 0.1 K/UL (ref 0–0.5)
EOSINOPHIL # BLD AUTO: 0.2 K/UL (ref 0–0.5)
EOSINOPHIL NFR BLD: 4.8 % (ref 0–8)
EOSINOPHIL NFR BLD: 4.8 % (ref 0–8)
EOSINOPHIL NFR BLD: 7 % (ref 0–8)
ERYTHROCYTE [DISTWIDTH] IN BLOOD BY AUTOMATED COUNT: 17.9 % (ref 11.5–14.5)
ERYTHROCYTE [DISTWIDTH] IN BLOOD BY AUTOMATED COUNT: 18 % (ref 11.5–14.5)
ERYTHROCYTE [DISTWIDTH] IN BLOOD BY AUTOMATED COUNT: 18.1 % (ref 11.5–14.5)
EST. GFR  (NO RACE VARIABLE): >60 ML/MIN/1.73 M^2
GLUCOSE SERPL-MCNC: 116 MG/DL (ref 70–110)
GLUCOSE SERPL-MCNC: 72 MG/DL (ref 70–110)
GLUCOSE SERPL-MCNC: 96 MG/DL (ref 70–110)
HCT VFR BLD AUTO: 19.5 % (ref 37–48.5)
HCT VFR BLD AUTO: 25.7 % (ref 37–48.5)
HCT VFR BLD AUTO: 27.8 % (ref 37–48.5)
HGB BLD-MCNC: 6.2 G/DL (ref 12–16)
HGB BLD-MCNC: 8 G/DL (ref 12–16)
HGB BLD-MCNC: 8.2 G/DL (ref 12–16)
IMM GRANULOCYTES # BLD AUTO: 0.01 K/UL (ref 0–0.04)
IMM GRANULOCYTES NFR BLD AUTO: 0.3 % (ref 0–0.5)
IMM GRANULOCYTES NFR BLD AUTO: 0.3 % (ref 0–0.5)
IMM GRANULOCYTES NFR BLD AUTO: 0.4 % (ref 0–0.5)
LACTATE SERPL-SCNC: 0.9 MMOL/L (ref 0.5–2.2)
LYMPHOCYTES # BLD AUTO: 0.3 K/UL (ref 1–4.8)
LYMPHOCYTES # BLD AUTO: 0.4 K/UL (ref 1–4.8)
LYMPHOCYTES # BLD AUTO: 0.6 K/UL (ref 1–4.8)
LYMPHOCYTES NFR BLD: 12.6 % (ref 18–48)
LYMPHOCYTES NFR BLD: 15 % (ref 18–48)
LYMPHOCYTES NFR BLD: 17.4 % (ref 18–48)
MAGNESIUM SERPL-MCNC: 1.2 MG/DL (ref 1.6–2.6)
MAGNESIUM SERPL-MCNC: 1.7 MG/DL (ref 1.6–2.6)
MCH RBC QN AUTO: 27.7 PG (ref 27–31)
MCH RBC QN AUTO: 28.9 PG (ref 27–31)
MCH RBC QN AUTO: 29.2 PG (ref 27–31)
MCHC RBC AUTO-ENTMCNC: 29.5 G/DL (ref 32–36)
MCHC RBC AUTO-ENTMCNC: 31.1 G/DL (ref 32–36)
MCHC RBC AUTO-ENTMCNC: 31.8 G/DL (ref 32–36)
MCV RBC AUTO: 92 FL (ref 82–98)
MCV RBC AUTO: 93 FL (ref 82–98)
MCV RBC AUTO: 94 FL (ref 82–98)
MONOCYTES # BLD AUTO: 0.4 K/UL (ref 0.3–1)
MONOCYTES # BLD AUTO: 0.4 K/UL (ref 0.3–1)
MONOCYTES # BLD AUTO: 0.6 K/UL (ref 0.3–1)
MONOCYTES NFR BLD: 14.3 % (ref 4–15)
MONOCYTES NFR BLD: 16.1 % (ref 4–15)
MONOCYTES NFR BLD: 16.8 % (ref 4–15)
NEUTROPHILS # BLD AUTO: 1.5 K/UL (ref 1.8–7.7)
NEUTROPHILS # BLD AUTO: 1.9 K/UL (ref 1.8–7.7)
NEUTROPHILS # BLD AUTO: 1.9 K/UL (ref 1.8–7.7)
NEUTROPHILS NFR BLD: 57.6 % (ref 38–73)
NEUTROPHILS NFR BLD: 64.9 % (ref 38–73)
NEUTROPHILS NFR BLD: 65.2 % (ref 38–73)
NRBC BLD-RTO: 0 /100 WBC
PHOSPHATE SERPL-MCNC: 2 MG/DL (ref 2.7–4.5)
PHOSPHATE SERPL-MCNC: 3 MG/DL (ref 2.7–4.5)
PLATELET # BLD AUTO: 55 K/UL (ref 150–450)
PLATELET # BLD AUTO: 82 K/UL (ref 150–450)
PLATELET # BLD AUTO: 87 K/UL (ref 150–450)
PMV BLD AUTO: 8.5 FL (ref 9.2–12.9)
PMV BLD AUTO: 9.6 FL (ref 9.2–12.9)
PMV BLD AUTO: 9.9 FL (ref 9.2–12.9)
POTASSIUM SERPL-SCNC: 2.6 MMOL/L (ref 3.5–5.1)
POTASSIUM SERPL-SCNC: 3.7 MMOL/L (ref 3.5–5.1)
POTASSIUM SERPL-SCNC: 3.7 MMOL/L (ref 3.5–5.1)
PROT SERPL-MCNC: 3.2 G/DL (ref 6–8.4)
PROT SERPL-MCNC: 4.9 G/DL (ref 6–8.4)
RBC # BLD AUTO: 2.12 M/UL (ref 4–5.4)
RBC # BLD AUTO: 2.77 M/UL (ref 4–5.4)
RBC # BLD AUTO: 2.96 M/UL (ref 4–5.4)
SODIUM SERPL-SCNC: 130 MMOL/L (ref 136–145)
SODIUM SERPL-SCNC: 131 MMOL/L (ref 136–145)
SODIUM SERPL-SCNC: 138 MMOL/L (ref 136–145)
WBC # BLD AUTO: 2.3 K/UL (ref 3.9–12.7)
WBC # BLD AUTO: 2.94 K/UL (ref 3.9–12.7)
WBC # BLD AUTO: 3.27 K/UL (ref 3.9–12.7)

## 2023-02-19 PROCEDURE — 83605 ASSAY OF LACTIC ACID: CPT | Performed by: INTERNAL MEDICINE

## 2023-02-19 PROCEDURE — 80053 COMPREHEN METABOLIC PANEL: CPT | Performed by: STUDENT IN AN ORGANIZED HEALTH CARE EDUCATION/TRAINING PROGRAM

## 2023-02-19 PROCEDURE — 20600001 HC STEP DOWN PRIVATE ROOM

## 2023-02-19 PROCEDURE — 84100 ASSAY OF PHOSPHORUS: CPT | Mod: 91 | Performed by: INTERNAL MEDICINE

## 2023-02-19 PROCEDURE — 63600175 PHARM REV CODE 636 W HCPCS: Performed by: NURSE PRACTITIONER

## 2023-02-19 PROCEDURE — 25000003 PHARM REV CODE 250: Performed by: STUDENT IN AN ORGANIZED HEALTH CARE EDUCATION/TRAINING PROGRAM

## 2023-02-19 PROCEDURE — 85025 COMPLETE CBC W/AUTO DIFF WBC: CPT | Mod: 91 | Performed by: INTERNAL MEDICINE

## 2023-02-19 PROCEDURE — 83735 ASSAY OF MAGNESIUM: CPT | Performed by: STUDENT IN AN ORGANIZED HEALTH CARE EDUCATION/TRAINING PROGRAM

## 2023-02-19 PROCEDURE — 63600175 PHARM REV CODE 636 W HCPCS: Performed by: INTERNAL MEDICINE

## 2023-02-19 PROCEDURE — 25000003 PHARM REV CODE 250: Performed by: HOSPITALIST

## 2023-02-19 PROCEDURE — 99233 PR SUBSEQUENT HOSPITAL CARE,LEVL III: ICD-10-PCS | Mod: ,,, | Performed by: INTERNAL MEDICINE

## 2023-02-19 PROCEDURE — 63600175 PHARM REV CODE 636 W HCPCS

## 2023-02-19 PROCEDURE — 99233 SBSQ HOSP IP/OBS HIGH 50: CPT | Mod: ,,, | Performed by: INTERNAL MEDICINE

## 2023-02-19 PROCEDURE — 80053 COMPREHEN METABOLIC PANEL: CPT | Mod: 91 | Performed by: INTERNAL MEDICINE

## 2023-02-19 PROCEDURE — 80048 BASIC METABOLIC PNL TOTAL CA: CPT | Mod: XB | Performed by: INTERNAL MEDICINE

## 2023-02-19 PROCEDURE — 83735 ASSAY OF MAGNESIUM: CPT | Mod: 91 | Performed by: INTERNAL MEDICINE

## 2023-02-19 PROCEDURE — 63600175 PHARM REV CODE 636 W HCPCS: Performed by: STUDENT IN AN ORGANIZED HEALTH CARE EDUCATION/TRAINING PROGRAM

## 2023-02-19 PROCEDURE — 25000003 PHARM REV CODE 250: Performed by: INTERNAL MEDICINE

## 2023-02-19 PROCEDURE — 84100 ASSAY OF PHOSPHORUS: CPT | Performed by: STUDENT IN AN ORGANIZED HEALTH CARE EDUCATION/TRAINING PROGRAM

## 2023-02-19 PROCEDURE — 25000003 PHARM REV CODE 250

## 2023-02-19 PROCEDURE — A4216 STERILE WATER/SALINE, 10 ML: HCPCS | Performed by: PSYCHIATRY & NEUROLOGY

## 2023-02-19 PROCEDURE — 25000003 PHARM REV CODE 250: Performed by: PSYCHIATRY & NEUROLOGY

## 2023-02-19 PROCEDURE — 85025 COMPLETE CBC W/AUTO DIFF WBC: CPT

## 2023-02-19 PROCEDURE — 27000207 HC ISOLATION

## 2023-02-19 RX ORDER — FUROSEMIDE 10 MG/ML
40 INJECTION INTRAMUSCULAR; INTRAVENOUS
Status: DISCONTINUED | OUTPATIENT
Start: 2023-02-19 | End: 2023-02-19

## 2023-02-19 RX ADMIN — DRONABINOL 2.5 MG: 2.5 CAPSULE ORAL at 10:02

## 2023-02-19 RX ADMIN — HYDROMORPHONE HYDROCHLORIDE 0.5 MG: 1 INJECTION, SOLUTION INTRAMUSCULAR; INTRAVENOUS; SUBCUTANEOUS at 02:02

## 2023-02-19 RX ADMIN — MEROPENEM 2 G: 1 INJECTION INTRAVENOUS at 01:02

## 2023-02-19 RX ADMIN — Medication 10 ML: at 05:02

## 2023-02-19 RX ADMIN — PANTOPRAZOLE SODIUM 40 MG: 40 TABLET, DELAYED RELEASE ORAL at 09:02

## 2023-02-19 RX ADMIN — MEROPENEM 2 G: 1 INJECTION INTRAVENOUS at 09:02

## 2023-02-19 RX ADMIN — BUPROPION HYDROCHLORIDE 150 MG: 150 TABLET, FILM COATED, EXTENDED RELEASE ORAL at 09:02

## 2023-02-19 RX ADMIN — DULOXETINE 30 MG: 30 CAPSULE, DELAYED RELEASE ORAL at 01:02

## 2023-02-19 RX ADMIN — GABAPENTIN 900 MG: 300 CAPSULE ORAL at 01:02

## 2023-02-19 RX ADMIN — GABAPENTIN 900 MG: 300 CAPSULE ORAL at 03:02

## 2023-02-19 RX ADMIN — DRONABINOL 2.5 MG: 2.5 CAPSULE ORAL at 09:02

## 2023-02-19 RX ADMIN — DRONABINOL 2.5 MG: 2.5 CAPSULE ORAL at 01:02

## 2023-02-19 RX ADMIN — FUROSEMIDE 40 MG: 10 INJECTION, SOLUTION INTRAMUSCULAR; INTRAVENOUS at 02:02

## 2023-02-19 RX ADMIN — LACTULOSE 20 G: 20 SOLUTION ORAL at 03:02

## 2023-02-19 RX ADMIN — DULOXETINE 30 MG: 30 CAPSULE, DELAYED RELEASE ORAL at 10:02

## 2023-02-19 RX ADMIN — DULOXETINE 30 MG: 30 CAPSULE, DELAYED RELEASE ORAL at 09:02

## 2023-02-19 RX ADMIN — METHOCARBAMOL 1000 MG: 500 TABLET ORAL at 01:02

## 2023-02-19 RX ADMIN — ACETAMINOPHEN 1000 MG: 500 TABLET ORAL at 10:02

## 2023-02-19 RX ADMIN — Medication 6 MG: at 10:02

## 2023-02-19 RX ADMIN — LACTULOSE 20 G: 20 SOLUTION ORAL at 10:02

## 2023-02-19 RX ADMIN — PROCHLORPERAZINE EDISYLATE 2.5 MG: 5 INJECTION INTRAMUSCULAR; INTRAVENOUS at 02:02

## 2023-02-19 RX ADMIN — HEPARIN SODIUM 5000 UNITS: 5000 INJECTION INTRAVENOUS; SUBCUTANEOUS at 01:02

## 2023-02-19 RX ADMIN — HEPARIN SODIUM 5000 UNITS: 5000 INJECTION INTRAVENOUS; SUBCUTANEOUS at 10:02

## 2023-02-19 RX ADMIN — Medication 10 ML: at 01:02

## 2023-02-19 RX ADMIN — HEPARIN SODIUM 5000 UNITS: 5000 INJECTION INTRAVENOUS; SUBCUTANEOUS at 03:02

## 2023-02-19 RX ADMIN — SODIUM CHLORIDE 500 ML: 9 INJECTION, SOLUTION INTRAVENOUS at 11:02

## 2023-02-19 RX ADMIN — MEROPENEM 2 G: 1 INJECTION INTRAVENOUS at 05:02

## 2023-02-19 RX ADMIN — GABAPENTIN 900 MG: 300 CAPSULE ORAL at 09:02

## 2023-02-19 RX ADMIN — SPIRONOLACTONE 100 MG: 100 TABLET, FILM COATED ORAL at 09:02

## 2023-02-19 RX ADMIN — GABAPENTIN 900 MG: 300 CAPSULE ORAL at 10:02

## 2023-02-19 NOTE — PLAN OF CARE
Problem: Adult Inpatient Plan of Care  Goal: Absence of Hospital-Acquired Illness or Injury  Outcome: Ongoing, Progressing     Problem: Adult Inpatient Plan of Care  Goal: Optimal Comfort and Wellbeing  Outcome: Ongoing, Progressing     Problem: Adult Inpatient Plan of Care  Goal: Readiness for Transition of Care  Outcome: Ongoing, Progressing     Problem: Bleeding (Sepsis/Septic Shock)  Goal: Absence of Bleeding  Outcome: Ongoing, Progressing     Problem: Glycemic Control Impaired (Sepsis/Septic Shock)  Goal: Blood Glucose Level Within Desired Range  Outcome: Ongoing, Progressing

## 2023-02-19 NOTE — ASSESSMENT & PLAN NOTE
42 year old woman s/p multiple spinal surgeries presented to OSH with possible infection of shoulder. Found to have UTI and E. Coli bacteremia and progressively worse BLE weakness. Transferred to [facility] for neurosurgical evaluation. Underwent Laminectomy T8-T10; Posterior spinal fusion T8-T12; hardware removal and washout on 1/24. Admitted to [facility] postop. On 2/2 underwent T4-pelvis fusion and T9-T10 corpectomies. To complete 8 week course of meropenem per ID for ESBL E coli from bone culture, end date 3/29.     - neuro checks q4h  - HV drains x 2 in place, management per NSGY  - meropenem for ESBL bacteremia per ID, eot 3/29/23  - Plastic surgery follow, wound vac discontinued  - CMP, Mag, Phos, CBC daily  - MAP goals >65, SBP < 180  - PRN labetalol, hydralazine  - MM pain regimen: PRN Dilaudid, Tylenol, gabapentin, robaxin, PRN oxycodone  - Aggressive bowel regimen  - PT/OT   2/9   On meropenem for MDR-ESCHERICHIA COLI ESBL  sodium trended down to 129.  Neurosurgery following post OR and aiding in pain management. PCA discontinued 2/8 . On oxycodone 10mg PO q4h . requiring IV dilaudid q4 as needed,. drains to gravity output 990ml/24h . Plastic surgery following flap, managing wounds.  Wound VAC discontinued on Wednesday.  accepted to [facility]   2/11 needs post op xrays when able to stand or sit, prior to discharge  2/15  CT abdomen done overnight as well - Postop change in the spine.  There is lucency about the bi iliac screws concerning for loosening.  Bony destruction T10 with interbody strut. neurosurgery f/u

## 2023-02-19 NOTE — ASSESSMENT & PLAN NOTE
MELD-Na score: 10 at 2/19/2023 12:55 PM  MELD score: 10 at 2/19/2023 12:55 PM  Calculated from:  Serum Creatinine: 0.5 mg/dL (Using min of 1 mg/dL) at 2/19/2023 12:55 PM  Serum Sodium: 130 mmol/L at 2/19/2023 12:55 PM  Total Bilirubin: 1.2 mg/dL at 2/19/2023  4:42 AM  INR(ratio): 1.3 at 2/17/2023  7:42 AM  Age: 42 years      Patient has reportedly refused transplant evaluation in the past.   - Daily CMP and trend LFTs  - Continue Lactulose 20 g TID  - Lasix 40 mg PO BID  - Will need Hepatology follow up outpatient  2/10   ammonia at 57 . sono abdomen  . tylenol changed to 1000mg q12h with cirrhosis . s/p IR paracentesis today   2/11no evidence of SBP on paracentesis. 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 AFB and Triglyerides on ascitic fluid.    2/13 s/p paracentesis. Removed 4,700 ml. DVT sonogram LE negative.2/13 s/p paracentesis. Removed 4,700 ml. DVT sonogram LE negative. s/p psychiatry eval  for anxiety/depression. xanax discontinued. Decreased Bupropion to 150 mg PO Daily . Started Duloxetine 30 mg PO BID and  PRN Hydroxyzine 25 mg PO q8h for anxiety

## 2023-02-19 NOTE — SUBJECTIVE & OBJECTIVE
Interval History: No events overnight. .insulin drip stopped and started on lasix 40 iv bid, UOP 4L for last 24hrs. Today she was hypotensive requiring 500 cc NS bolus     Review of Systems   Constitutional:  Negative for chills and fever.   Respiratory:  Negative for chest tightness and shortness of breath.    Cardiovascular:  Positive for leg swelling. Negative for chest pain.   Gastrointestinal:  Positive for abdominal distention. Negative for abdominal pain and nausea.   Neurological:  Negative for dizziness and weakness.   Objective:     Vital Signs (Most Recent):  Temp: 98.4 °F (36.9 °C) (02/19/23 1524)  Pulse: 100 (02/19/23 1524)  Resp: 19 (02/19/23 1524)  BP: (!) 89/42 (02/19/23 1524)  SpO2: 98 % (02/19/23 1524)   Vital Signs (24h Range):  Temp:  [97.8 °F (36.6 °C)-98.6 °F (37 °C)] 98.4 °F (36.9 °C)  Pulse:  [] 100  Resp:  [14-19] 19  SpO2:  [95 %-99 %] 98 %  BP: ()/(37-58) 89/42     Weight: (!) 140.7 kg (310 lb 3 oz)  Body mass index is 48.58 kg/m².    Intake/Output Summary (Last 24 hours) at 2/19/2023 1536  Last data filed at 2/19/2023 1250  Gross per 24 hour   Intake --   Output 4055 ml   Net -4055 ml        Physical Exam  Vitals and nursing note reviewed.   Constitutional:       Appearance: She is well-developed.   Eyes:      Pupils: Pupils are equal, round, and reactive to light.   Cardiovascular:      Rate and Rhythm: Normal rate and regular rhythm.   Pulmonary:      Effort: Pulmonary effort is normal.      Breath sounds: Normal breath sounds.   Abdominal:      General: There is distension.      Palpations: Abdomen is soft.      Tenderness: There is no abdominal tenderness.      Comments: 4 drains in place with serosanguinous fluid   Musculoskeletal:         General: No tenderness.      Right lower leg: Edema present.      Left lower leg: Edema present.   Skin:     General: Skin is warm and dry.   Neurological:      Mental Status: She is alert and oriented to person, place, and time.    Psychiatric:         Behavior: Behavior normal.       Significant Labs: All pertinent labs within the past 24 hours have been reviewed.    Significant Imaging: I have reviewed all pertinent imaging results/findings within the past 24 hours.

## 2023-02-19 NOTE — NURSING
Notified Dr. Bahena that pt has been really drowsy since the hypotension. She arouses to a light shake when she previously aroused to voice. No new orders given. Will continue to monitor.

## 2023-02-19 NOTE — CARE UPDATE
"RAPID RESPONSE NURSE CHART REVIEW       Chart Reviewed: 02/19/2023, 10:20 AM    MRN: [id number]  Bed: 8068/8068 A    Dx: Weakness of both lower extremities    Rachel Zazueta has a past medical history of Anemia, Anxiety, Depressed, Diskitis, Hep C w/o coma, chronic, IV drug abuse, Kidney stone, and Liver cirrhosis.    Last VS: BP (!) 96/54 (BP Location: Right arm, Patient Position: Lying)   Pulse 104   Temp 98.5 °F (36.9 °C) (Oral)   Resp 18   Ht 5' 7" (1.702 m)   Wt (!) 140.7 kg (310 lb 3 oz)   LMP 01/23/2023   SpO2 95%   Breastfeeding No   BMI 48.58 kg/m²     24H Vital Sign Range:  Temp:  [97.6 °F (36.4 °C)-98.6 °F (37 °C)]   Pulse:  []   Resp:  [14-18]   BP: ()/(45-56)   SpO2:  [95 %-99 %]     Level of Consciousness (AVPU): responds to voice    Recent Labs     02/18/23  0634 02/19/23  0256 02/19/23  0442   WBC 3.45* 2.30* 2.94*   HGB 8.2* 6.2* 8.2*   HCT 27.2* 19.5* 27.8*   * 55* 87*       Recent Labs     02/18/23  0634 02/19/23  0256 02/19/23  0442   * 138 131*   K 3.7 2.6* 3.7   CL 95 112* 93*   CO2 31* 22* 30*   CREATININE 0.6 0.4* 0.6   GLU 91 72 116*   PHOS 3.6 2.0* 3.0   MG 1.9 1.2* 1.7        No results for input(s): PH, PCO2, PO2, HCO3, POCSATURATED, BE in the last 72 hours.     OXYGEN:  Flow (L/min): 0  Oxygen Concentration (%): 24       MEWS score: 4    Charge RNRicki  contacted. No concerns verbalized at this time. Instructed to call [phone] for further concerns or assistance.    Yoselin Don RN        "

## 2023-02-19 NOTE — PROGRESS NOTES
02/19/23 1130   Vital Signs   BP (!) 78/48   BP Location Right arm   BP Method Manual   Patient Position Lying     Notified Dr. Bahena of above BP. Orders given to give pt 500 ml bolus. Will continue to monitor.

## 2023-02-19 NOTE — PROGRESS NOTES
02/19/23 1125   Vital Signs   BP (!) 82/47   MAP (mmHg) 59   BP Location Right arm   BP Method Automatic   Patient Position Lying       Notified Dr. Bahena. He will come see the pt. No new orders at this time

## 2023-02-19 NOTE — PROGRESS NOTES
[facility] - Telemetry [facility] Medicine  Progress Note    Patient Name: Rachel Zazueta  MRN: [id number]  Patient Class: IP- Inpatient   Admission Date: 1/19/2023  Length of Stay: 31 days  Attending Physician: Joaquin Bahena MD  Primary Care Provider: Dannielle Merino DO        Subjective:     Principal Problem:Weakness of both lower extremities        HPI:  Ms. Zazueta is a 42 y.o F w/ hx ofIV drug use (methamphetamine), chronic HCV with cirrhosis, spinal fusion (04/2021), epidural abscess with removal of hardware (02/2022), spinal fusion with hardware (T10 - Pelvis / 03/2022), obesity (BMI 39.3) and neurogenic bladder who initially presented to [facility] for evaluation of back pain and left leg weakness.  She reports initially noting the left leg weakness when she was about to go to the bathroom and was about to fall but was assisted by her boyfriend.  She was brought to the ED via EMS.  Urinalysis with UTI concerns and blood cultures at OSH grew ESBL E coli so she was treated with meropenem.  During hospitalization, she reports the weakness that started out in her left leg initially then spread upwards to her left thigh, left hip, then crossed over to the right hip and spread to her right leg.  Denies recent IV drug use. She reports her last incidence of drug use was more than 3 years ago and also denies tobacco, and alcohol abuse.  Reports cannabis use.    She also reports more than 10 years ago she had an episode of a headache that lasted about 4 days and was associated with residual defects of a strabismus in the left eye with associated visual disturbances.  She also reports over the past few years her friends have noticed that she sometimes has word-finding difficulty during conversations.     Reports shortness of breath when sitting up, fever, chills, back pain, lower extremity weakness(initally L>R, but now R>L), diarrhea.  Denies cough, nausea, vomiting, constipation, bladder or  bowel incontinence, dysuria, dark urine, new vision changes.    OSH course notable for concerning urinalysis and blood cultures positive for ESBL E coli treated with meropenem.  She was also admit to the ICU where she was treated with Precedex for significant body aches, irritability, and muscle spasms.  Concerns of a murmur on physical exam so she underwent TTE which did not show any vegetations.  Worsening lower extremity weakness workup with MRI head nonspecific, MRI cervical spine with multilevel spondylosis, X-ray spine with multilevel thoracolumbar fusion and diskectomy, focal kyphosis at T9-10 increased compared to prior.  She was started on prophylaxis amoxicillin due to her history of infected hardware.  MRI spine unable to be completed due to patient's body habitus so she was transferred to [facility] for further imaging and Neurosurgery, Neurology evaluation.      Overview/Hospital Course:  Pt admitted as a transfer from [facility] for worsening LE weakness, concern for spinal infection, and need for MRI which could not be done at OSH. Imaging was completed here. Worsening proximal junctional kyphosis noted at T9-10 noted with concern for discitis at T8-9, T9-10. NSGY evaluated.     Found to have T8-T10 kyphosis on MRI. 1/24 underwent laminectomy T8-T10; posterior fusion T8-T12; and washout. 2/2 underwent T4-pelvis fusion and T9-T10 corpectomy.  ID consulted for thoracic discitis infection.  Patient to complete 8 weeks of therapy with meropenem. Patient underwent flap closure with Plastic surgery.  Step-down Hospital Medicine on 02/08.    2/9   On meropenem for MDR-ESCHERICHIA COLI ESBL  sodium trended down to 129.  Neurosurgery following post OR and aiding in pain management. PCA discontinued 2/8 . On oxycodone 10mg PO q4h . requiring IV dilaudid q4 as needed,. drains to gravity output 990ml/24h . Plastic surgery following flap, managing wounds.  Wound VAC discontinued on Wednesday.  accepted to [facility] pain  management consulted for back ache- switched to multimodal therapy. sodium trended down to 127.   2/10 sodium stable at 127 . continues with increased drain output 985ml/24h . negative balance of  2.8 L lasix on hold.  nephrology consulted. abdominal X ray -  Nonobstructive bowel gas pattern.  No free air.  As before, mild gaseous distension of stomach. ammonia at 57 . sono abdomen  . tylenol changed to 1000mg q12h with cirrhosis . sono abdomen -Cirrhotic liver morphology.  Sequela of portal hypertension including moderate ascites, recanalized umbilical vein.  No focal hepatic lesions. Cholecystectomy. . Nephrology recs  fluid restriction to 1 L,  lasix 60 mg IV daily,  and trend sodium q12h.  diazepam discontinued . s/p IR paracentesis today   2/11no evidence of SBP on paracentesis. 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 AFB and Triglyerides on ascitic fluid.  noncompliant with  fluid restriction to 1 L despite counseling. sodium trended down to 126 .received 2 doses of IV lasix 60mg. drain output 250ml. Increased Lasix IV 80 BID  due to UOP 500ml/24h   2/12 UOP of 1250ml/24h. K and P replaced. sodium trended up to 129.  hepatology eval -If ascitic fluid triglyceride level is elevated, needs IR  eval for lymphangiogram and surgery eval for  lymphatic repair. xanax 0.25mg BID PRN for anxiety   2/13 s/p paracentesis. Removed 4,700 ml. DVT sonogram LE negative. s/p psychiatry eval  for anxiety/depression. xanax discontinued. Decreased Bupropion to 150 mg PO Daily . Started Duloxetine 30 mg PO BID and  PRN Hydroxyzine 25 mg PO q8h for anxiety  2/14 Hb 6.9 Transfusion with 1 unit of PRBC . FOBT. sodium trended up to 130 . Diuresing well with lasix IV . DVT sonogram - Nonspecific fluid collection adjacent to the left iliac vein.  Recommend further evaluation with contrast enhanced CT scan . has Iodine contrast allergy- . 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  Diuresing well on IV lasix BID. negative balance of  13.6 L . IR eval -repeat paracentesis to trend volume output and  conservative management   with a low fat diet, octreotide, diuretics. Neurosurgery for input regarding chylous ascitis as possible neurosurgical complication or not. CT abdomen done overnight as well - Postop change in the spine.  There is lucency about the bi iliac screws concerning for loosening.  Bony destruction T10 with interbody strut. started on salt tablets 1g BID x 1day and lasix drip 10mg/h x 6h .  spironolactone  increased to 100 mg Qday. Continue IV Lasix. Dietary consulted for  high-protein and low-fat diet with medium-chain triglycerides  diet.  paracentesis 6.6L removed   2/16 Continuing gentle IV lasix with assistance from nephrology, remains fluid overloaded  2/17: continue Lasix infusion, responding well      Interval History: No events overnight. .insulin drip stopped and started on lasix 40 iv bid, UOP 4L for last 24hrs. Today she was hypotensive requiring 500 cc NS bolus     Review of Systems   Constitutional:  Negative for chills and fever.   Respiratory:  Negative for chest tightness and shortness of breath.    Cardiovascular:  Positive for leg swelling. Negative for chest pain.   Gastrointestinal:  Positive for abdominal distention. Negative for abdominal pain and nausea.   Neurological:  Negative for dizziness and weakness.   Objective:     Vital Signs (Most Recent):  Temp: 98.4 °F (36.9 °C) (02/19/23 1524)  Pulse: 100 (02/19/23 1524)  Resp: 19 (02/19/23 1524)  BP: (!) 89/42 (02/19/23 1524)  SpO2: 98 % (02/19/23 1524)   Vital Signs (24h Range):  Temp:  [97.8 °F (36.6 °C)-98.6 °F (37 °C)] 98.4 °F (36.9 °C)  Pulse:  [] 100  Resp:  [14-19] 19  SpO2:  [95 %-99 %] 98 %  BP: ()/(37-58) 89/42     Weight: (!)  140.7 kg (310 lb 3 oz)  Body mass index is 48.58 kg/m².    Intake/Output Summary (Last 24 hours) at 2/19/2023 1536  Last data filed at 2/19/2023 1250  Gross per 24 hour   Intake --   Output 4055 ml   Net -4055 ml        Physical Exam  Vitals and nursing note reviewed.   Constitutional:       Appearance: She is well-developed.   Eyes:      Pupils: Pupils are equal, round, and reactive to light.   Cardiovascular:      Rate and Rhythm: Normal rate and regular rhythm.   Pulmonary:      Effort: Pulmonary effort is normal.      Breath sounds: Normal breath sounds.   Abdominal:      General: There is distension.      Palpations: Abdomen is soft.      Tenderness: There is no abdominal tenderness.      Comments: 4 drains in place with serosanguinous fluid   Musculoskeletal:         General: No tenderness.      Right lower leg: Edema present.      Left lower leg: Edema present.   Skin:     General: Skin is warm and dry.   Neurological:      Mental Status: She is alert and oriented to person, place, and time.   Psychiatric:         Behavior: Behavior normal.       Significant Labs: All pertinent labs within the past 24 hours have been reviewed.    Significant Imaging: I have reviewed all pertinent imaging results/findings within the past 24 hours.      Assessment/Plan:      * Weakness of both lower extremities  42 year old woman s/p multiple spinal surgeries presented to OSH with possible infection of shoulder. Found to have UTI and E. Coli bacteremia and progressively worse BLE weakness. Transferred [place] [facility] [place] evaluation. Underwent Laminectomy T8-T10; Posterior spinal fusion T8-T12; hardware removal and washout on 1/24. Admitted to [facility] postop. On 2/2 underwent T4-pelvis fusion and T9-T10 corpectomies. To complete 8 week course of meropenem per ID for ESBL E coli from bone culture, end date 3/29.     - neuro checks q4h  - HV drains x 2 in place, management per NSGY  - meropenem for ESBL bacteremia per ID, eot  3/29/23  - Plastic surgery follow, wound vac discontinued  - CMP, Mag, Phos, CBC daily  - MAP goals >65, SBP < 180  - PRN labetalol, hydralazine  - MM pain regimen: PRN Dilaudid, Tylenol, gabapentin, robaxin, PRN oxycodone  - Aggressive bowel regimen  - PT/OT   2/9   On meropenem for MDR-ESCHERICHIA COLI ESBL  sodium trended down to 129.  Neurosurgery following post OR and aiding in pain management. PCA discontinued 2/8 . On oxycodone 10mg PO q4h . requiring IV dilaudid q4 as needed,. drains to gravity output 990ml/24h . Plastic surgery following flap, managing wounds.  Wound VAC discontinued on Wednesday.  accepted to [facility]   2/11 needs post op xrays when able to stand or sit, prior to discharge  2/15  CT abdomen done overnight as well - Postop change in the spine.  There is lucency about the bi iliac screws concerning for loosening.  Bony destruction T10 with interbody strut. neurosurgery f/u     Hypotension  - lasix stopped  - given bolus 500 x1  - will monitor BP , will consider vasopressor       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  IR consulted  for lymphangiogram and  eval for  lymphatic repair  2/15  IR eval -repeat paracentesis to trend volume output and  conservative management    spironolactone  increased to 100 mg Qday. Continue IV Lasix. Dietary consulted for  high-protein and low-fat diet with medium-chain triglycerides  diet.     Ascites due to alcoholic cirrhosis  2/10  sono abdomen -Cirrhotic liver morphology.  Sequela of portal hypertension including moderate ascites, recanalized umbilical vein.  No focal hepatic lesions. Cholecystectomy. s/p IR paracentesis as above  2/11no evidence of SBP on paracentesis. 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 AFB and Triglyerides on ascitic fluid.    2/12  hepatology eval -If ascitic fluid triglyceride level is elevated, needs IR  eval for  lymphangiogram and surgery eval for  lymphatic repair    Abdominal pain  2/10  abdominal X ray -  Nonobstructive bowel gas pattern.  No free air.  As before, mild gaseous distension of stomach. ammonia at 57 . sono abdomen with ascitis . simethicone PRN  2/11no evidence of SBP on paracentesis. 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 AFB and Triglyerides on ascitic fluid.      Hyponatremia  - Patient with sodium   Recent Labs   Lab 02/19/23  0256 02/19/23  0442 02/19/23  1255    131* 130*   . sodium trending down    2/9 monitor on lasix BID.sodium trended down to 127.   2/10 sodium stable at 127 . continues with increased drain output 985ml/24h . negative balance of  2.8 L lasix on hold.  nephrology consulted.  Nephrology recs  fluid restriction to 1 L,  lasix 60 mg IV daily,  and trend sodium q12h.  diazepam discontinued   2/11  noncompliant with  fluid restriction to 1 L despite counseling. sodium trended down to 126 .received 2 doses of IV lasix 60mg. Increased Lasix IV 80 BID  due to UOP 500ml/24h   2/12 UOP of 1250ml/24h.sodium trended up to 129   2/14 sodium trended up to 130 . Diuresing well with lasix IV .   2/15 started on salt tablets 1g BID and lasix drip 10mg/h x 6h  2/16 continue Lasix 40 IVP x 1 with Lasix drip 10mg/hr x 6   2/17 sodium 134  2/18 sodium 131. On lasix drip . Increased from 5 to 10  2/19 sodium 130, lasix stopped     Acute blood loss anemia  Hb 6.4 on 2/6, received 1U pRBCs. Hb 2/7 WNL.  - monitor Hb  - transfuse to maintain Hb >7  2/9    Patient's with Normocytic anemia.. Hemoglobin stable. Etiology likely due to chronic disease .  Current CBC reviewed-    Recent Labs   Lab 02/19/23  0256 02/19/23  0442 02/19/23  1255   HGB 6.2* 8.2* 8.0*    Monitor CBC and transfuse if H/H drops below 7/21.   2/14 Hb 6.9 Transfusion with 1 unit of PRBC . FOBT negative  2/17: hgb 8.1, stable  2/18: hbg 8.2 stable   2/19 stable , hbg  8    Kyphosis of thoracolumbar region  See weakness    Thoracic discitis  See Weakness of both lower extremities    Anxiety and depression  Evaluated by Psychiatry at OSH prior to transfer. Recommended increasing bupropion.  - Continue bupropion 300 mg QD  - Gabapentin 600 mg TID for anxiety and neuropathy  - PRN diazepam 5 mg q6h discontinued  2/13   s/p psychiatry eval  for anxiety/depression. xanax discontinued. Decreased Bupropion to 150 mg PO Daily . Started Duloxetine 30 mg PO BID and  PRN Hydroxyzine 25 mg PO q8h for anxiety    Severe obesity (BMI >= 40)  Body mass index is 48.58 kg/m². Morbid obesity complicates all aspects of disease management from diagnostic modalities to treatment. Weight loss encouraged    Substance use disorder  Denies IV drug use (meth) for past 3 years. Denies alcohol and tobacco abuse.     Chronic hepatitis C with cirrhosis  See above    Cirrhosis of liver with ascites  MELD-Na score: 10 at 2/19/2023 12:55 PM  MELD score: 10 at 2/19/2023 12:55 PM  Calculated from:  Serum Creatinine: 0.5 mg/dL (Using min of 1 mg/dL) at 2/19/2023 12:55 PM  Serum Sodium: 130 mmol/L at 2/19/2023 12:55 PM  Total Bilirubin: 1.2 mg/dL at 2/19/2023  4:42 AM  INR(ratio): 1.3 at 2/17/2023  7:42 AM  Age: 42 years      Patient has reportedly refused transplant evaluation in the past.   - Daily CMP and trend LFTs  - Continue Lactulose 20 g TID  - Lasix 40 mg PO BID  - Will need Hepatology follow up outpatient  2/10   ammonia at 57 . sono abdomen  . tylenol changed to 1000mg q12h with cirrhosis . s/p IR paracentesis today   2/11no evidence of SBP on paracentesis. 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 AFB and Triglyerides on ascitic fluid.    2/13 s/p paracentesis. Removed 4,700 ml. DVT sonogram LE negative.2/13 s/p paracentesis. Removed 4,700 ml. DVT sonogram LE negative. s/p psychiatry eval  for anxiety/depression. xanax discontinued. Decreased  Bupropion to 150 mg PO Daily . Started Duloxetine 30 mg PO BID and  PRN Hydroxyzine 25 mg PO q8h for anxiety    Pancytopenia  - acute on chronic  - follows with hematology outpatient  - CBC daily      VTE Risk Mitigation (From admission, onward)         Ordered     heparin (porcine) injection 5,000 Units  Every 8 hours         02/04/23 0848     IP VTE HIGH RISK PATIENT  Once         01/19/23 2153     Place sequential compression device  Until discontinued         01/19/23 2153     Reason for No Pharmacological VTE Prophylaxis  Once        Question:  Reasons:  Answer:  Physician Provided (leave comment)  Comment:  Potential NSGY procedure    01/19/23 2153                Discharge Planning   SHIRA: 2/20/2023     Code Status: Partial Code   Is the patient medically ready for discharge?: No    Reason for patient still in hospital (select all that apply): Treatment  Discharge Plan A: Long-term acute care facility (LTAC)   Discharge Delays: None known at this time              Joaquin Bahena MD  Department of Hospital Medicine   [facility] - Telemetry Stepdown

## 2023-02-19 NOTE — PROGRESS NOTES
02/19/23 1130   Vital Signs   BP (!) 78/48   BP Location Right arm   BP Method Manual   Patient Position Lying     Notified Dr. Bahena. Orders given for 500 ml bolus. Will continue to monitor.

## 2023-02-19 NOTE — CARE UPDATE
RAPID RESPONSE NURSE PROACTIVE ROUNDING NOTE       Time of Visit: 1150    Admit Date: 2023  LOS: 31  Code Status: Partial Code   Date of Visit: 2023  : 1980  Age: 42 y.o.  Sex: female  Race: White  Bed: 8070/8068 A:   MRN: [id number]  Was the patient discharged from an ICU this admission? Yes   Was the patient discharged from a PACU within last 24 hours? No   Did the patient receive conscious sedation/general anesthesia in last 24 hours? No  Was the patient in the ED within the past 24 hours? No  Was the patient on NIPPV within the past 24 hours? No   Attending Physician: Joaquin Bahena MD  Primary Service: [facility] MED N   Time spent at the bedside: < 15 min    SITUATION    Notified by charge RN via phone call.  Reason for alert: hypotension  Called to evaluate the patient for Circulatory    BACKGROUND     Why is the patient in the hospital?: Weakness of both lower extremities    Patient has a past medical history of Anemia, Anxiety, Depressed, Diskitis, Hep C w/o coma, chronic, IV drug abuse, Kidney stone, and Liver cirrhosis.    Last Vitals:  Temp: 98 °F (36.7 °C) ( 112)  Pulse: 104 ( 112)  Resp: 19 ( 112)  BP: 85/48 ( 1151)  SpO2: 96 % ( 112)    24 Hours Vitals Range:  Temp:  [97.6 °F (36.4 °C)-98.6 °F (37 °C)]   Pulse:  []   Resp:  [14-19]   BP: ()/(37-56)   SpO2:  [95 %-99 %]     Labs:  Recent Labs     23  0634 23  0256 23  0442   WBC 3.45* 2.30* 2.94*   HGB 8.2* 6.2* 8.2*   HCT 27.2* 19.5* 27.8*   * 55* 87*       Recent Labs     23  0634 23  0256 23  0442   * 138 131*   K 3.7 2.6* 3.7   CL 95 112* 93*   CO2 31* 22* 30*   CREATININE 0.6 0.4* 0.6   GLU 91 72 116*   PHOS 3.6 2.0* 3.0   MG 1.9 1.2* 1.7        No results for input(s): PH, PCO2, PO2, HCO3, POCSATURATED, BE in the last 72 hours.     ASSESSMENT    Physical Exam  Vitals reviewed.   Pulmonary:      Effort: Pulmonary effort is normal.    Abdominal:      General: Abdomen is flat.   Musculoskeletal:      Right lower le+ Pitting Edema present.      Left lower le+ Pitting Edema present.   Skin:     General: Skin is warm and dry.      Coloration: Skin is pale.   Neurological:      Mental Status: She is alert.     Patient resting in bed, awake and alert. Respirations even and unlabored. Current BP 85/48, NS bolus infusing. Pt states she is tired but this is not out of the ordinary for her.     INTERVENTIONS    The patient was seen for Cardiac problem. Staff concerns included hypotension. The following interventions were performed: continuous cardiac monitoring continued, continuous pulse ox monitoring continued, and normal saline 500 ml IV bolus .    RECOMMENDATIONS    - Complete bolus as ordered, repeat BP. Call if patient remains hypotensive after NS bolus    PROVIDER ESCALATION    Yes/No  no    Orders received and case discussed with NA.    Disposition: Remain in room 8060.    FOLLOW-UP    Bedside RNOpal  updated on plan of care. Instructed to call the Rapid Response Nurse, Yoselin Don RN at [phone] for additional questions or concerns.

## 2023-02-19 NOTE — TREATMENT PLAN
Hepatology Treatment Plan    Rachel Zazueta is a 42 y.o. female admitted to hospital 1/19/2023 (Hospital Day: 32) due to Weakness of both lower extremities.     Interval History  Patient seen this AM. No acute events overnight. Having excellent response to diuresis - 9.3 L fluid output yesterday.     No longer as tearful. S/p paracentesis on 2/15 - 6.6 L fluid removed. Negative for SBP.     Objective  Temp:  [97.6 °F (36.4 °C)-98.6 °F (37 °C)] 98 °F (36.7 °C) (02/19 1121)  Pulse:  [] 104 (02/19 1121)  BP: ()/(37-56) 82/47 (02/19 1125)  Resp:  [14-19] 19 (02/19 1121)  SpO2:  [95 %-99 %] 96 % (02/19 1121)    Constitutional: Appears well-developed and well-nourished. severe obesity  Head: Normocephalic and atraumatic.   Neck: Normal range of motion. Neck supple.   Cardiovascular: Normal heart rate.  Regular heart rhythm.  Pulmonary/Chest: Effort normal.   Abdominal:+  distension.  No tenderness  Musculoskeletal: Normal range of motion. ++ edema. drains in place   Neurological: Alert and oriented to person, place, and time. LE strength - 3-4/5   Skin: Skin is warm and dry.   Psychiatric: Normal mood and affect. Behavior is normal.     Laboratory    Lab Results   Component Value Date    WBC 2.94 (L) 02/19/2023    HGB 8.2 (L) 02/19/2023    HCT 27.8 (L) 02/19/2023    MCV 94 02/19/2023    PLT 87 (L) 02/19/2023       Lab Results   Component Value Date     (L) 02/19/2023    K 3.7 02/19/2023    CL 93 (L) 02/19/2023    CO2 30 (H) 02/19/2023    BUN 22 (H) 02/19/2023    CREATININE 0.6 02/19/2023    CALCIUM 7.7 (L) 02/19/2023       Lab Results   Component Value Date    ALBUMIN 1.9 (L) 02/19/2023    ALT 30 02/19/2023    AST 47 (H) 02/19/2023    GGT 57 (H) 02/07/2022    ALKPHOS 151 (H) 02/19/2023    BILITOT 1.2 (H) 02/19/2023       Lab Results   Component Value Date    INR 1.3 (H) 02/17/2023    INR 1.3 (H) 02/06/2023    INR 1.2 02/01/2023       MELD-Na score: 10 at 2/19/2023  4:42 AM  MELD score: 10 at  2/19/2023  4:42 AM  Calculated from:  Serum Creatinine: 0.6 mg/dL (Using min of 1 mg/dL) at 2/19/2023  4:42 AM  Serum Sodium: 131 mmol/L at 2/19/2023  4:42 AM  Total Bilirubin: 1.2 mg/dL at 2/19/2023  4:42 AM  INR(ratio): 1.3 at 2/17/2023  7:42 AM  Age: 42 years    Assessment     Rachel Zazueta is a 42 y.o. female with history of IVDU (methamphetamine), HCV cirrhosis, spinal fusion with hardware with history of epidural abscess and hardware removal (Feb 2022) who is here for thoracic discitis with ESBL E. Coli s/p laminectomy, thoracic-pelvis fusion with corpectomies by NSGY and flap closures by plastics. 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 Would likely be from recent surgical interventions interrupting lymphatic drainage. Has known HCV cirrhosis, no prior ascites. Ascitic TG level pending. SAAG elevated consistent with portal HTN. 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 AFB culture stain negative. TTE showed EF 58%.     Problem List:  Chylous ascites  HCV cirrhosis  MELD-Na score: 10 at 2/19/2023  4:42 AM  MELD score: 10 at 2/19/2023  4:42 AM  Calculated from:  Serum Creatinine: 0.6 mg/dL (Using min of 1 mg/dL) at 2/19/2023  4:42 AM  Serum Sodium: 131 mmol/L at 2/19/2023  4:42 AM  Total Bilirubin: 1.2 mg/dL at 2/19/2023  4:42 AM  INR(ratio): 1.3 at 2/17/2023  7:42 AM  Age: 42 years     Plan  - Continue diuresis per nephrology   - Low sodium, high-protein and low-fat diet with medium-chain triglycerides (MCT) diet.   - Large volume paracentesis as needed.   - Hold off on octreotide for now.   - If patient continues to have worsening ascites, will consult IR again for possible lymphangiogram and/or lymphatic repair  - Follow AFB culture.   - please obtain daily CBC, CMP, INR  - Plan of care was discussed with primary  team    Thank you for involving us in the care of Rachel Mariellaivethcelestino. Please call with any additional concerns or questions.We will continue to follow.     Luis Angel Chandra MD, PGY-IV  Gastroenterology Fellow  [facility]

## 2023-02-19 NOTE — ASSESSMENT & PLAN NOTE
- Patient with sodium   Recent Labs   Lab 02/19/23  0256 02/19/23  0442 02/19/23  1255    131* 130*   . sodium trending down    2/9 monitor on lasix BID.sodium trended down to 127.   2/10 sodium stable at 127 . continues with increased drain output 985ml/24h . negative balance of  2.8 L lasix on hold.  nephrology consulted.  Nephrology recs  fluid restriction to 1 L,  lasix 60 mg IV daily,  and trend sodium q12h.  diazepam discontinued   2/11  noncompliant with  fluid restriction to 1 L despite counseling. sodium trended down to 126 .received 2 doses of IV lasix 60mg. Increased Lasix IV 80 BID  due to UOP 500ml/24h   2/12 UOP of 1250ml/24h.sodium trended up to 129   2/14 sodium trended up to 130 . Diuresing well with lasix IV .   2/15 started on salt tablets 1g BID and lasix drip 10mg/h x 6h  2/16 continue Lasix 40 IVP x 1 with Lasix drip 10mg/hr x 6   2/17 sodium 134  2/18 sodium 131. On lasix drip . Increased from 5 to 10  2/19 sodium 130, lasix stopped

## 2023-02-19 NOTE — ASSESSMENT & PLAN NOTE
Hb 6.4 on 2/6, received 1U pRBCs. Hb 2/7 WNL.  - monitor Hb  - transfuse to maintain Hb >7  2/9    Patient's with Normocytic anemia.. Hemoglobin stable. Etiology likely due to chronic disease .  Current CBC reviewed-    Recent Labs   Lab 02/19/23  0256 02/19/23  0442 02/19/23  1255   HGB 6.2* 8.2* 8.0*    Monitor CBC and transfuse if H/H drops below 7/21.   2/14 Hb 6.9 Transfusion with 1 unit of PRBC . FOBT negative  2/17: hgb 8.1, stable  2/18: hbg 8.2 stable   2/19 stable , hbg 8

## 2023-02-20 ENCOUNTER — PATIENT MESSAGE (OUTPATIENT)
Dept: ADMINISTRATIVE | Facility: OTHER | Age: 43
End: 2023-02-20
Payer: MEDICAID

## 2023-02-20 PROBLEM — A49.8 E COLI INFECTION: Status: ACTIVE | Noted: 2023-02-20

## 2023-02-20 PROBLEM — G95.9 MYELOPATHY: Status: ACTIVE | Noted: 2023-02-20

## 2023-02-20 PROBLEM — G89.4 CHRONIC PAIN SYNDROME: Status: ACTIVE | Noted: 2023-02-20

## 2023-02-20 LAB
ALBUMIN SERPL BCP-MCNC: 1.8 G/DL (ref 3.5–5.2)
ALP SERPL-CCNC: 150 U/L (ref 55–135)
ALT SERPL W/O P-5'-P-CCNC: 31 U/L (ref 10–44)
ANION GAP SERPL CALC-SCNC: 4 MMOL/L (ref 8–16)
AST SERPL-CCNC: 50 U/L (ref 10–40)
BACTERIA SPEC ANAEROBE CULT: NORMAL
BACTERIA SPEC ANAEROBE CULT: NORMAL
BASOPHILS # BLD AUTO: 0.02 K/UL (ref 0–0.2)
BASOPHILS NFR BLD: 0.7 % (ref 0–1.9)
BILIRUB SERPL-MCNC: 1.3 MG/DL (ref 0.1–1)
BUN SERPL-MCNC: 21 MG/DL (ref 6–20)
CALCIUM SERPL-MCNC: 7.9 MG/DL (ref 8.7–10.5)
CHLORIDE SERPL-SCNC: 94 MMOL/L (ref 95–110)
CO2 SERPL-SCNC: 31 MMOL/L (ref 23–29)
CREAT SERPL-MCNC: 0.5 MG/DL (ref 0.5–1.4)
DIFFERENTIAL METHOD: ABNORMAL
EOSINOPHIL # BLD AUTO: 0.1 K/UL (ref 0–0.5)
EOSINOPHIL NFR BLD: 4.2 % (ref 0–8)
ERYTHROCYTE [DISTWIDTH] IN BLOOD BY AUTOMATED COUNT: 17.8 % (ref 11.5–14.5)
EST. GFR  (NO RACE VARIABLE): >60 ML/MIN/1.73 M^2
GLUCOSE SERPL-MCNC: 81 MG/DL (ref 70–110)
HCT VFR BLD AUTO: 26.6 % (ref 37–48.5)
HGB BLD-MCNC: 8.3 G/DL (ref 12–16)
IMM GRANULOCYTES # BLD AUTO: 0.01 K/UL (ref 0–0.04)
IMM GRANULOCYTES NFR BLD AUTO: 0.3 % (ref 0–0.5)
INR PPP: 1.2 (ref 0.8–1.2)
LYMPHOCYTES # BLD AUTO: 0.6 K/UL (ref 1–4.8)
LYMPHOCYTES NFR BLD: 19 % (ref 18–48)
MAGNESIUM SERPL-MCNC: 2 MG/DL (ref 1.6–2.6)
MCH RBC QN AUTO: 28.5 PG (ref 27–31)
MCHC RBC AUTO-ENTMCNC: 31.2 G/DL (ref 32–36)
MCV RBC AUTO: 91 FL (ref 82–98)
MONOCYTES # BLD AUTO: 0.5 K/UL (ref 0.3–1)
MONOCYTES NFR BLD: 17.3 % (ref 4–15)
NEUTROPHILS # BLD AUTO: 1.8 K/UL (ref 1.8–7.7)
NEUTROPHILS NFR BLD: 58.5 % (ref 38–73)
NRBC BLD-RTO: 0 /100 WBC
PHOSPHATE SERPL-MCNC: 2.5 MG/DL (ref 2.7–4.5)
PLATELET # BLD AUTO: 92 K/UL (ref 150–450)
PMV BLD AUTO: 9.3 FL (ref 9.2–12.9)
POTASSIUM SERPL-SCNC: 4 MMOL/L (ref 3.5–5.1)
PROT SERPL-MCNC: 4.9 G/DL (ref 6–8.4)
PROTHROMBIN TIME: 12.6 SEC (ref 9–12.5)
RBC # BLD AUTO: 2.91 M/UL (ref 4–5.4)
SODIUM SERPL-SCNC: 129 MMOL/L (ref 136–145)
WBC # BLD AUTO: 3.06 K/UL (ref 3.9–12.7)

## 2023-02-20 PROCEDURE — 99233 SBSQ HOSP IP/OBS HIGH 50: CPT | Mod: ,,, | Performed by: INTERNAL MEDICINE

## 2023-02-20 PROCEDURE — 84100 ASSAY OF PHOSPHORUS: CPT | Performed by: STUDENT IN AN ORGANIZED HEALTH CARE EDUCATION/TRAINING PROGRAM

## 2023-02-20 PROCEDURE — 99233 PR SUBSEQUENT HOSPITAL CARE,LEVL III: ICD-10-PCS | Mod: ,,, | Performed by: INTERNAL MEDICINE

## 2023-02-20 PROCEDURE — 80053 COMPREHEN METABOLIC PANEL: CPT | Performed by: STUDENT IN AN ORGANIZED HEALTH CARE EDUCATION/TRAINING PROGRAM

## 2023-02-20 PROCEDURE — 85610 PROTHROMBIN TIME: CPT | Performed by: STUDENT IN AN ORGANIZED HEALTH CARE EDUCATION/TRAINING PROGRAM

## 2023-02-20 PROCEDURE — 25000003 PHARM REV CODE 250: Performed by: PSYCHIATRY & NEUROLOGY

## 2023-02-20 PROCEDURE — 83735 ASSAY OF MAGNESIUM: CPT | Performed by: STUDENT IN AN ORGANIZED HEALTH CARE EDUCATION/TRAINING PROGRAM

## 2023-02-20 PROCEDURE — 25000003 PHARM REV CODE 250: Performed by: STUDENT IN AN ORGANIZED HEALTH CARE EDUCATION/TRAINING PROGRAM

## 2023-02-20 PROCEDURE — 63600175 PHARM REV CODE 636 W HCPCS: Performed by: STUDENT IN AN ORGANIZED HEALTH CARE EDUCATION/TRAINING PROGRAM

## 2023-02-20 PROCEDURE — 87522 HEPATITIS C REVRS TRNSCRPJ: CPT | Performed by: STUDENT IN AN ORGANIZED HEALTH CARE EDUCATION/TRAINING PROGRAM

## 2023-02-20 PROCEDURE — 85025 COMPLETE CBC W/AUTO DIFF WBC: CPT

## 2023-02-20 PROCEDURE — 93010 ELECTROCARDIOGRAM REPORT: CPT | Mod: ,,, | Performed by: INTERNAL MEDICINE

## 2023-02-20 PROCEDURE — 63600175 PHARM REV CODE 636 W HCPCS

## 2023-02-20 PROCEDURE — 63600175 PHARM REV CODE 636 W HCPCS: Performed by: NURSE PRACTITIONER

## 2023-02-20 PROCEDURE — 99024 PR POST-OP FOLLOW-UP VISIT: ICD-10-PCS | Mod: ,,, | Performed by: PHYSICIAN ASSISTANT

## 2023-02-20 PROCEDURE — 99233 SBSQ HOSP IP/OBS HIGH 50: CPT | Mod: ,,, | Performed by: STUDENT IN AN ORGANIZED HEALTH CARE EDUCATION/TRAINING PROGRAM

## 2023-02-20 PROCEDURE — 20600001 HC STEP DOWN PRIVATE ROOM

## 2023-02-20 PROCEDURE — 94761 N-INVAS EAR/PLS OXIMETRY MLT: CPT

## 2023-02-20 PROCEDURE — 25000003 PHARM REV CODE 250: Performed by: HOSPITALIST

## 2023-02-20 PROCEDURE — 93005 ELECTROCARDIOGRAM TRACING: CPT

## 2023-02-20 PROCEDURE — A4216 STERILE WATER/SALINE, 10 ML: HCPCS | Performed by: PSYCHIATRY & NEUROLOGY

## 2023-02-20 PROCEDURE — 25000003 PHARM REV CODE 250

## 2023-02-20 PROCEDURE — 99024 POSTOP FOLLOW-UP VISIT: CPT | Mod: ,,, | Performed by: PHYSICIAN ASSISTANT

## 2023-02-20 PROCEDURE — 93010 EKG 12-LEAD: ICD-10-PCS | Mod: ,,, | Performed by: INTERNAL MEDICINE

## 2023-02-20 PROCEDURE — 25000003 PHARM REV CODE 250: Performed by: INTERNAL MEDICINE

## 2023-02-20 PROCEDURE — 99233 PR SUBSEQUENT HOSPITAL CARE,LEVL III: ICD-10-PCS | Mod: ,,, | Performed by: STUDENT IN AN ORGANIZED HEALTH CARE EDUCATION/TRAINING PROGRAM

## 2023-02-20 PROCEDURE — 97530 THERAPEUTIC ACTIVITIES: CPT | Mod: CQ

## 2023-02-20 PROCEDURE — 27000207 HC ISOLATION

## 2023-02-20 RX ORDER — FUROSEMIDE 10 MG/ML
40 INJECTION INTRAMUSCULAR; INTRAVENOUS 2 TIMES DAILY
Status: DISCONTINUED | OUTPATIENT
Start: 2023-02-20 | End: 2023-02-20

## 2023-02-20 RX ORDER — BUMETANIDE 1 MG/1
1 TABLET ORAL DAILY
Status: DISCONTINUED | OUTPATIENT
Start: 2023-02-20 | End: 2023-02-21

## 2023-02-20 RX ADMIN — HEPARIN SODIUM 5000 UNITS: 5000 INJECTION INTRAVENOUS; SUBCUTANEOUS at 04:02

## 2023-02-20 RX ADMIN — HYDROMORPHONE HYDROCHLORIDE 0.5 MG: 1 INJECTION, SOLUTION INTRAMUSCULAR; INTRAVENOUS; SUBCUTANEOUS at 12:02

## 2023-02-20 RX ADMIN — GABAPENTIN 900 MG: 300 CAPSULE ORAL at 04:02

## 2023-02-20 RX ADMIN — GABAPENTIN 900 MG: 300 CAPSULE ORAL at 08:02

## 2023-02-20 RX ADMIN — Medication 10 ML: at 05:02

## 2023-02-20 RX ADMIN — ACETAMINOPHEN 1000 MG: 500 TABLET ORAL at 08:02

## 2023-02-20 RX ADMIN — DULOXETINE 30 MG: 30 CAPSULE, DELAYED RELEASE ORAL at 08:02

## 2023-02-20 RX ADMIN — PROCHLORPERAZINE EDISYLATE 2.5 MG: 5 INJECTION INTRAMUSCULAR; INTRAVENOUS at 07:02

## 2023-02-20 RX ADMIN — PANTOPRAZOLE SODIUM 40 MG: 40 TABLET, DELAYED RELEASE ORAL at 10:02

## 2023-02-20 RX ADMIN — OXYCODONE HYDROCHLORIDE 10 MG: 10 TABLET ORAL at 06:02

## 2023-02-20 RX ADMIN — GABAPENTIN 900 MG: 300 CAPSULE ORAL at 10:02

## 2023-02-20 RX ADMIN — MEROPENEM 2 G: 1 INJECTION INTRAVENOUS at 08:02

## 2023-02-20 RX ADMIN — METHOCARBAMOL 1000 MG: 500 TABLET ORAL at 12:02

## 2023-02-20 RX ADMIN — HEPARIN SODIUM 5000 UNITS: 5000 INJECTION INTRAVENOUS; SUBCUTANEOUS at 08:02

## 2023-02-20 RX ADMIN — FUROSEMIDE 40 MG: 10 INJECTION, SOLUTION INTRAMUSCULAR; INTRAVENOUS at 10:02

## 2023-02-20 RX ADMIN — METHOCARBAMOL 1000 MG: 500 TABLET ORAL at 11:02

## 2023-02-20 RX ADMIN — ACETAMINOPHEN 1000 MG: 500 TABLET ORAL at 10:02

## 2023-02-20 RX ADMIN — HEPARIN SODIUM 5000 UNITS: 5000 INJECTION INTRAVENOUS; SUBCUTANEOUS at 05:02

## 2023-02-20 RX ADMIN — LACTULOSE 20 G: 20 SOLUTION ORAL at 08:02

## 2023-02-20 RX ADMIN — DULOXETINE 30 MG: 30 CAPSULE, DELAYED RELEASE ORAL at 10:02

## 2023-02-20 RX ADMIN — Medication 10 ML: at 12:02

## 2023-02-20 RX ADMIN — MEROPENEM 2 G: 1 INJECTION INTRAVENOUS at 11:02

## 2023-02-20 RX ADMIN — Medication 6 MG: at 08:02

## 2023-02-20 RX ADMIN — BUPROPION HYDROCHLORIDE 150 MG: 150 TABLET, FILM COATED, EXTENDED RELEASE ORAL at 10:02

## 2023-02-20 RX ADMIN — BUMETANIDE 1 MG: 1 TABLET ORAL at 04:02

## 2023-02-20 RX ADMIN — METHOCARBAMOL 1000 MG: 500 TABLET ORAL at 06:02

## 2023-02-20 RX ADMIN — MEROPENEM 2 G: 1 INJECTION INTRAVENOUS at 02:02

## 2023-02-20 NOTE — PROGRESS NOTES
Plastic and Reconstructive Surgery   Progress Note    Subjective:    No acute events, afebrile. Drains overall are decreasing from previously very high output.  Pain is controlled.      Objective:  Vital signs in last 24 hours:  Temp:  [98 °F (36.7 °C)-98.4 °F (36.9 °C)] 98 °F (36.7 °C)  Pulse:  [] 103  Resp:  [14-19] 14  SpO2:  [96 %-99 %] 99 %  BP: ()/(37-58) 115/51    Intake/Output last 3 shifts:  I/O last 3 completed shifts:  In: -   Out: 4500 [Urine:3975; Drains:525]    Intake/Output this shift:  No intake/output data recorded.        Physical Exam:  VITAL SIGNS:   Vitals:    23 0349 23 0556 23 0733 23 0738   BP: (!) 90/52   (!) 115/51   BP Location:       Patient Position: Lying      Pulse: 98  101 103   Resp: 14      Temp: 98.4 °F (36.9 °C)   98 °F (36.7 °C)   TempSrc:       SpO2: 98%   99%   Weight:  (!) 142 kg (313 lb)     Height:         TMAX: Temp (24hrs), Av.2 °F (36.8 °C), Min:98 °F (36.7 °C), Max:98.4 °F (36.9 °C)    General: Alert; No acute distress  Cardiovascular: Regular rate   Respiratory: Normal respiratory effort. Chest rise symmetric.   Abdomen: Soft, nontender, nondistended  Extremity: Moves all extremities equally.  Neurologic: No focal deficit. Speech normal  Spine incision is intact. Staples still in place.no signs of infection      Scheduled Medications acetaminophen, 1,000 mg, Q12H  bisacodyL, 10 mg, Every other day  buPROPion, 150 mg, Daily  DULoxetine, 30 mg, BID  furosemide (LASIX) injection, 40 mg, BID  gabapentin, 900 mg, TID  heparin (porcine), 5,000 Units, Q8H  lactulose, 20 g, TID  meropenem (MERREM) IVPB, 2 g, Q8H  methocarbamoL, 1,000 mg, Q6H  pantoprazole, 40 mg, Daily  sodium chloride 0.9%, 10 mL, Q6H      PRN Medications sodium chloride, sodium chloride, sodium chloride, dextrose 10%, dextrose 10%, diphenhydrAMINE, glucagon (human recombinant), glucose, glucose, hydrALAZINE, HYDROmorphone, hydrOXYzine HCL, magnesium hydroxide 400 mg/5  ml, melatonin, naloxone, ondansetron, oxyCODONE **AND** oxyCODONE, polyethylene glycol, predniSONE, prochlorperazine, senna-docusate 8.6-50 mg, simethicone, sodium chloride 0.9%, Flushing PICC Protocol **AND** sodium chloride 0.9% **AND** sodium chloride 0.9%    Recent Labs:   Lab Results   Component Value Date    WBC 3.06 (L) 02/20/2023    HGB 8.3 (L) 02/20/2023    HCT 26.6 (L) 02/20/2023    MCV 91 02/20/2023    PLT 92 (L) 02/20/2023     Lab Results   Component Value Date    GLU 81 02/20/2023     (L) 02/20/2023    K 4.0 02/20/2023    CL 94 (L) 02/20/2023    BUN 21 (H) 02/20/2023         Assessment: 42 y.o. y/o female 18 Days Post-Op s/p Procedure(s):  LAMINECTOMY, SPINE, THORACIC, WITH FUSION (T4-Pelvis fusion w/ T9-10 corpectomies) **AIRO Doing well postoperatively.  Drains with SSO , decreasing output    Plan  -Continue to monitor drain output  -Staples to be removed starting this week.  -Rest of care per primary

## 2023-02-20 NOTE — ASSESSMENT & PLAN NOTE
- Patient with sodium   Recent Labs   Lab 02/19/23  0442 02/19/23  1255 02/20/23  0351   * 130* 129*   . sodium trending down    2/9 monitor on lasix BID.sodium trended down to 127.   2/10 sodium stable at 127 . continues with increased drain output 985ml/24h . negative balance of  2.8 L lasix on hold.  nephrology consulted.  Nephrology recs  fluid restriction to 1 L,  lasix 60 mg IV daily,  and trend sodium q12h.  diazepam discontinued   2/11  noncompliant with  fluid restriction to 1 L despite counseling. sodium trended down to 126 .received 2 doses of IV lasix 60mg. Increased Lasix IV 80 BID  due to UOP 500ml/24h   2/12 UOP of 1250ml/24h.sodium trended up to 129   2/14 sodium trended up to 130 . Diuresing well with lasix IV .   2/15 started on salt tablets 1g BID and lasix drip 10mg/h x 6h  2/16 continue Lasix 40 IVP x 1 with Lasix drip 10mg/hr x 6   2/17 sodium 134  2/18 sodium 131. On lasix drip . Increased from 5 to 10  2/19 sodium 130, lasix stopped   2/20 Bumex initiated per Nephrology recs

## 2023-02-20 NOTE — SUBJECTIVE & OBJECTIVE
Interval History: No events overnight. UOP 2775mL for last 24hrs.  Soft BP remained overnight despite IV fluid bolus yesterday.  Spironolactone discontinued for now.  Per discussion with Nephrology, IV Lasix transitioned to IV Bumex.  Patient reports suboptimal pain control; understands BP being mitigating factor.  She also thinks that her abdomen is starting to become distended again.    Review of Systems   Constitutional:  Positive for appetite change (so-so). Negative for chills and fever.   HENT:  Negative for congestion, sore throat and trouble swallowing.    Eyes:  Negative for visual disturbance.   Respiratory:  Negative for cough, chest tightness and shortness of breath.    Cardiovascular:  Positive for leg swelling. Negative for chest pain.   Gastrointestinal:  Positive for abdominal distention. Negative for abdominal pain, constipation, diarrhea and nausea.   Genitourinary:  Negative for dysuria.   Musculoskeletal:  Positive for arthralgias.   Skin:  Negative for rash.   Neurological:  Negative for dizziness, weakness and light-headedness.   Psychiatric/Behavioral:  Negative for decreased concentration.    Objective:     Vital Signs (Most Recent):  Temp: 98.2 °F (36.8 °C) (02/20/23 1145)  Pulse: 97 (02/20/23 1145)  Resp: 16 (02/20/23 1245)  BP: (!) 114/55 (02/20/23 1145)  SpO2: 98 % (02/20/23 1145)   Vital Signs (24h Range):  Temp:  [98 °F (36.7 °C)-98.4 °F (36.9 °C)] 98.2 °F (36.8 °C)  Pulse:  [] 97  Resp:  [14-19] 16  SpO2:  [97 %-99 %] 98 %  BP: ()/(42-56) 114/55     Weight: (!) 142 kg (313 lb)  Body mass index is 49.02 kg/m².    Intake/Output Summary (Last 24 hours) at 2/20/2023 1422  Last data filed at 2/20/2023 0504  Gross per 24 hour   Intake --   Output 1100 ml   Net -1100 ml        Physical Exam  Vitals and nursing note reviewed.   Constitutional:       General: She is not in acute distress.     Appearance: She is well-developed. She is obese. She is ill-appearing. She is not  toxic-appearing or diaphoretic.   HENT:      Head: Normocephalic and atraumatic.      Right Ear: External ear normal.      Left Ear: External ear normal.      Nose: Nose normal.      Mouth/Throat:      Mouth: Mucous membranes are moist.      Pharynx: Oropharynx is clear.   Eyes:      General: No scleral icterus.        Right eye: No discharge.   Cardiovascular:      Rate and Rhythm: Normal rate and regular rhythm.      Pulses: Normal pulses.      Heart sounds: Normal heart sounds.   Pulmonary:      Effort: Pulmonary effort is normal.      Breath sounds: Normal breath sounds.   Abdominal:      General: There is distension.      Palpations: Abdomen is soft.      Tenderness: There is no abdominal tenderness. There is no guarding or rebound.      Comments: 4 drains in place with serosanguinous fluid   Musculoskeletal:         General: No tenderness.      Cervical back: Neck supple.      Right lower leg: Edema present.      Left lower leg: Edema present.   Skin:     General: Skin is warm and dry.   Neurological:      Mental Status: She is alert and oriented to person, place, and time.   Psychiatric:         Behavior: Behavior normal.       Significant Labs: All pertinent labs within the past 24 hours have been reviewed.      Recent Results (from the past 24 hour(s))   Comprehensive Metabolic Panel    Collection Time: 02/20/23  3:51 AM   Result Value Ref Range    Sodium 129 (L) 136 - 145 mmol/L    Potassium 4.0 3.5 - 5.1 mmol/L    Chloride 94 (L) 95 - 110 mmol/L    CO2 31 (H) 23 - 29 mmol/L    Glucose 81 70 - 110 mg/dL    BUN 21 (H) 6 - 20 mg/dL    Creatinine 0.5 0.5 - 1.4 mg/dL    Calcium 7.9 (L) 8.7 - 10.5 mg/dL    Total Protein 4.9 (L) 6.0 - 8.4 g/dL    Albumin 1.8 (L) 3.5 - 5.2 g/dL    Total Bilirubin 1.3 (H) 0.1 - 1.0 mg/dL    Alkaline Phosphatase 150 (H) 55 - 135 U/L    AST 50 (H) 10 - 40 U/L    ALT 31 10 - 44 U/L    Anion Gap 4 (L) 8 - 16 mmol/L    eGFR >60.0 >60 mL/min/1.73 m^2   Magnesium    Collection Time:  02/20/23  3:51 AM   Result Value Ref Range    Magnesium 2.0 1.6 - 2.6 mg/dL   Phosphorus    Collection Time: 02/20/23  3:51 AM   Result Value Ref Range    Phosphorus 2.5 (L) 2.7 - 4.5 mg/dL   CBC Auto Differential    Collection Time: 02/20/23  3:53 AM   Result Value Ref Range    WBC 3.06 (L) 3.90 - 12.70 K/uL    RBC 2.91 (L) 4.00 - 5.40 M/uL    Hemoglobin 8.3 (L) 12.0 - 16.0 g/dL    Hematocrit 26.6 (L) 37.0 - 48.5 %    MCV 91 82 - 98 fL    MCH 28.5 27.0 - 31.0 pg    MCHC 31.2 (L) 32.0 - 36.0 g/dL    RDW 17.8 (H) 11.5 - 14.5 %    Platelets 92 (L) 150 - 450 K/uL    MPV 9.3 9.2 - 12.9 fL    Immature Granulocytes 0.3 0.0 - 0.5 %    Gran # (ANC) 1.8 1.8 - 7.7 K/uL    Immature Grans (Abs) 0.01 0.00 - 0.04 K/uL    Lymph # 0.6 (L) 1.0 - 4.8 K/uL    Mono # 0.5 0.3 - 1.0 K/uL    Eos # 0.1 0.0 - 0.5 K/uL    Baso # 0.02 0.00 - 0.20 K/uL    nRBC 0 0 /100 WBC    Gran % 58.5 38.0 - 73.0 %    Lymph % 19.0 18.0 - 48.0 %    Mono % 17.3 (H) 4.0 - 15.0 %    Eosinophil % 4.2 0.0 - 8.0 %    Basophil % 0.7 0.0 - 1.9 %    Differential Method Automated    Protime-INR    Collection Time: 02/20/23 11:18 AM   Result Value Ref Range    Prothrombin Time 12.6 (H) 9.0 - 12.5 sec    INR 1.2 0.8 - 1.2       Significant Imaging: I have reviewed all pertinent imaging results/findings within the past 24 hours.

## 2023-02-20 NOTE — PROGRESS NOTES
"[facility] - Telemetry Stepdown  Nephrology  Progress Note    Patient Name: Rachel Zazueta  MRN: [id number]  Admission Date: 1/19/2023  Hospital Length of Stay: 32 days  Attending Provider: Stephanie Nelson MD   Primary Care Physician: Dannielle Merino DO  Principal Problem:Weakness of both lower extremities    Subjective:     HPI: Per HM note: "42 y.o F w/ hx ofIV drug use (methamphetamine), chronic HCV with cirrhosis, spinal fusion (04/2021), epidural abscess with removal of hardware (02/2022), spinal fusion with hardware (T10 - Pelvis / 03/2022), obesity (BMI 39.3) and neurogenic bladder and recent shoulder surgery who initially presented to [facility] for evaluation of back pain and left leg weakness. She reports initially noting the left leg weakness when she was about to go to the bathroom and was about to fall but was assisted by her boyfriend.  She was brought to the ED via EMS. Urinalysis with UTI concerns and blood cultures at OSH grew ESBL E coli, and shoulder effusion concern for infection so she was treated with meropenem.  During hospitalization, she reports the weakness that started out in her left leg initially then spread upwards to her left thigh, left hip, then crossed over to the right hip and spread to her right leg.  Denies recent IV drug use. She reports her last incidence of drug use was more than 3 years ago and also denies tobacco, and alcohol abuse.  Reports cannabis use.     OSH course notable for concerning urinalysis and blood cultures positive for ESBL E coli treated with meropenem.  She was also admit to the ICU where she was treated with Precedex for significant body aches, irritability, and muscle spasms.  Concerns of a murmur on physical exam so she underwent TTE which did not show any vegetations.  Worsening lower extremity weakness workup with MRI head nonspecific, MRI cervical spine with multilevel spondylosis, X-ray spine with multilevel thoracolumbar fusion and " "diskectomy, focal kyphosis at T9-10 increased compared to prior.  She was started on prophylaxis amoxicillin due to her history of infected hardware.  MRI spine unable to be completed due to patient's body habitus so she was transferred to [facility] [place] for further imaging and Neurosurgery, Neurology evaluation." Underwent laminectomy, posterior fusion and washout on 1/24. ID following for discitis, has her on meropenem. Stepped down to  on 2/8.     Nephrology consulted for hyponatremia. Patient reports uncomfortable abdominal distension and leg swelling. Reports drinking 3-4 cups of her 30oz water tumbler daily. Urine Na <10, urine osm 513. Serum Na trend 136--> 132-->131-->129-->127-->135-->134-->127. Normal mentation. Cr/BUN wnl. Albumin 1.8, protein 4.5, bili 1.4.          Interval History: hypotensive episode over the weekend. Reports no PO intake  Received 500 cc NS bolus    Review of patient's allergies indicates:   Allergen Reactions    Bee venom protein (honey bee) Anaphylaxis     Patient reports she is allergic to bee stings.    Naproxen Anaphylaxis     Throat closing    12-23- Patient reports taking Ibuprofen 200 mg at home without problems. Verified X3. [place]    Wasp sting [allergen ext-venom-honey bee] Anaphylaxis    Adhesive Blisters    Shellfish containing products     Iodine and iodide containing products Hives and Itching     Allergic to iodine in seafood only    Nuts [tree nut] Rash     Current Facility-Administered Medications   Medication Frequency    acetaminophen tablet 1,000 mg Q12H    bisacodyL suppository 10 mg Every other day    buPROPion TB24 tablet 150 mg Daily    dextrose 10% bolus 125 mL 125 mL PRN    dextrose 10% bolus 250 mL 250 mL PRN    diphenhydrAMINE capsule 25 mg On Call Procedure    DULoxetine DR capsule 30 mg BID    furosemide injection 40 mg BID    gabapentin capsule 900 mg TID    glucagon (human recombinant) injection 1 mg PRN    glucose chewable tablet 16 g PRN    " glucose chewable tablet 24 g PRN    heparin (porcine) injection 5,000 Units Q8H    hydrALAZINE tablet 25 mg Q8H PRN    HYDROmorphone injection 0.5 mg Q4H PRN    hydrOXYzine HCL tablet 25 mg TID PRN    lactulose 20 gram/30 mL solution Soln 20 g TID    melatonin tablet 6 mg Nightly PRN    meropenem (MERREM) 2 g in sodium chloride 0.9% 100 mL IVPB Q8H    methocarbamoL tablet 1,000 mg Q6H    naloxone 0.4 mg/mL injection 0.02 mg PRN    ondansetron injection 4 mg Q6H PRN    oxyCODONE immediate release tablet 5 mg Q3H PRN    And    oxyCODONE immediate release tablet Tab 10 mg Q3H PRN    pantoprazole EC tablet 40 mg Daily    polyethylene glycol packet 17 g Daily PRN    predniSONE tablet 50 mg On Call Procedure    prochlorperazine injection Soln 2.5 mg Q6H PRN    senna-docusate 8.6-50 mg per tablet 1 tablet BID PRN    simethicone chewable tablet 80 mg TID PRN    sodium chloride 0.9% flush 10 mL Q6H    And    sodium chloride 0.9% flush 10 mL PRN       Objective:     Vital Signs (Most Recent):  Temp: 98 °F (36.7 °C) (02/20/23 0738)  Pulse: 98 (02/20/23 1127)  Resp: 17 (02/20/23 1046)  BP: (!) 115/51 (02/20/23 0738)  SpO2: 98 % (02/20/23 1046)   Vital Signs (24h Range):  Temp:  [98 °F (36.7 °C)-98.4 °F (36.9 °C)] 98 °F (36.7 °C)  Pulse:  [] 98  Resp:  [14-19] 17  SpO2:  [97 %-99 %] 98 %  BP: ()/(42-58) 115/51     Weight: (!) 142 kg (313 lb) (02/20/23 0556)  Body mass index is 49.02 kg/m².  Body surface area is 2.59 meters squared.    I/O last 3 completed shifts:  In: -   Out: 4500 [Urine:3975; Drains:525]    Physical Exam  Constitutional:       General: She is not in acute distress.     Appearance: Normal appearance. She is obese. She is not ill-appearing.   HENT:      Head: Normocephalic and atraumatic.   Eyes:      General: No scleral icterus.  Cardiovascular:      Rate and Rhythm: Normal rate.      Pulses: Normal pulses.   Pulmonary:      Effort: Pulmonary effort is normal.   Abdominal:       General: There is distension.      Tenderness: There is abdominal tenderness. There is no guarding.   Musculoskeletal:      Right lower leg: Edema present.      Left lower leg: Edema present.   Skin:     General: Skin is warm and dry.      Coloration: Skin is not jaundiced.   Neurological:      Mental Status: She is alert and oriented to person, place, and time.       Significant Labs:  All labs within the past 24 hours have been reviewed.     Significant Imaging:  Labs: Reviewed    Assessment/Plan:     Renal/  Hyponatremia  42 y.o F w/ hx of IV drug use (meth), chronic HCV with cirrhosis, spinal fusion (04/2021), epidural abscess with removal of hardware (02/2022), spinal fusion with hardware (T10 - Pelvis / 03/2022), and neurogenic bladder here with complicated ESBL E.coli epidural abscess s/p washout,corpectomy, fixation being treated w/ meropenem.     Nephrology consulted for hyponatremia. Patient reports uncomfortable abdominal distension and leg swelling. Reports drinking 3-4 cups of her 30oz water tumbler daily. Urine Na <10, urine osm 513. Serum osm 283. Serum Na stable. Normal mentation.    Hyponatremia likely 2/2 to low effective circulating volume 2/2 cirrhosis. Possible poor solute intake relative to free water intake. Unlikely SIADH given low urine Na.   Repeat urine studies 2/14 suggestive of appropriate ADH in setting of cirrhosis    Change lasix to bumex 1 mg for better longer lasting bioavailably.    -this may be her new baseline sodium, short of more drastic measures where risk outweighs benefit.  Continue fluid restriction, though needs to have at least 500 cc intake      GI  Chronic hepatitis C with cirrhosis  Will need to f/u with outpatient hepatology for antiviral treatment    Cirrhosis of liver with ascites  Would minimize risk of acute liver injury by restricting tylenol use, however will defer to primary        Thank you for your consult. I will follow-up with patient. Please contact us  if you have any additional questions.    Spencer Khan MD  Nephrology  [facility] - Telemetry Stepdown

## 2023-02-20 NOTE — PLAN OF CARE
Problem: Adult Inpatient Plan of Care  Goal: Plan of Care Review  Outcome: Ongoing, Not Progressing  Goal: Patient-Specific Goal (Individualized)  Outcome: Ongoing, Not Progressing  Goal: Absence of Hospital-Acquired Illness or Injury  Outcome: Ongoing, Not Progressing  Goal: Optimal Comfort and Wellbeing  Outcome: Ongoing, Not Progressing  Goal: Readiness for Transition of Care  Outcome: Ongoing, Not Progressing     Problem: Adjustment to Illness (Sepsis/Septic Shock)  Goal: Optimal Coping  Outcome: Ongoing, Not Progressing     Problem: Bleeding (Sepsis/Septic Shock)  Goal: Absence of Bleeding  Outcome: Ongoing, Not Progressing     Problem: Glycemic Control Impaired (Sepsis/Septic Shock)  Goal: Blood Glucose Level Within Desired Range  Outcome: Ongoing, Not Progressing     Problem: Infection Progression (Sepsis/Septic Shock)  Goal: Absence of Infection Signs and Symptoms  Outcome: Ongoing, Not Progressing     Problem: Infection  Goal: Absence of Infection Signs and Symptoms  Outcome: Ongoing, Not Progressing     Problem: Skin Injury Risk Increased  Goal: Skin Health and Integrity  Outcome: Ongoing, Not Progressing     Problem: Fall Injury Risk  Goal: Absence of Fall and Fall-Related Injury  Outcome: Ongoing, Not Progressing     Problem: Adjustment to Illness (Stroke, Hemorrhagic)  Goal: Optimal Coping  Outcome: Ongoing, Not Progressing     Problem: Bowel Elimination Impaired (Stroke, Hemorrhagic)  Goal: Effective Bowel Elimination  Outcome: Ongoing, Not Progressing     Problem: Cerebral Tissue Perfusion (Stroke, Hemorrhagic)  Goal: Optimal Cerebral Tissue Perfusion  Outcome: Ongoing, Not Progressing     Problem: Cognitive Impairment (Stroke, Hemorrhagic)  Goal: Optimal Cognitive Function  Outcome: Ongoing, Not Progressing     Problem: Communication Impairment (Stroke, Hemorrhagic)  Goal: Effective Communication Skills  Outcome: Ongoing, Not Progressing     Problem: Functional Ability Impaired (Stroke,  Hemorrhagic)  Goal: Optimal Functional Ability  Outcome: Ongoing, Not Progressing     Problem: Pain (Stroke, Hemorrhagic)  Goal: Acceptable Pain Control  Outcome: Ongoing, Not Progressing     Problem: Respiratory Compromise (Stroke, Hemorrhagic)  Goal: Effective Oxygenation and Ventilation  Outcome: Ongoing, Not Progressing     Problem: Sensorimotor Impairment (Stroke, Hemorrhagic)  Goal: Improved Sensorimotor Function  Outcome: Ongoing, Not Progressing     Problem: Swallowing Impairment (Stroke, Hemorrhagic)  Goal: Optimal Eating and Swallowing Without Aspiration  Outcome: Ongoing, Not Progressing     Problem: Urinary Elimination Impaired (Stroke, Hemorrhagic)  Goal: Effective Urinary Elimination  Outcome: Ongoing, Not Progressing     Problem: Bariatric Environmental Safety  Goal: Safety Maintained with Care  Outcome: Ongoing, Not Progressing

## 2023-02-20 NOTE — PROGRESS NOTES
[facility] - Telemetry [facility] Medicine  Progress Note    Patient Name: Rachel Zazueta  MRN: [id number]  Patient Class: IP- Inpatient   Admission Date: 1/19/2023  Length of Stay: 32 days  Attending Physician: Stephanie Nelson MD  Primary Care Provider: Dannielle Merino DO        Subjective:     Principal Problem:Weakness of both lower extremities        HPI:  Ms. Zazueta is a 42 y.o F w/ hx ofIV drug use (methamphetamine), chronic HCV with cirrhosis, spinal fusion (04/2021), epidural abscess with removal of hardware (02/2022), spinal fusion with hardware (T10 - Pelvis / 03/2022), obesity (BMI 39.3) and neurogenic bladder who initially presented to [facility] for evaluation of back pain and left leg weakness.  She reports initially noting the left leg weakness when she was about to go to the bathroom and was about to fall but was assisted by her boyfriend.  She was brought to the ED via EMS.  Urinalysis with UTI concerns and blood cultures at OSH grew ESBL E coli so she was treated with meropenem.  During hospitalization, she reports the weakness that started out in her left leg initially then spread upwards to her left thigh, left hip, then crossed over to the right hip and spread to her right leg.  Denies recent IV drug use. She reports her last incidence of drug use was more than 3 years ago and also denies tobacco, and alcohol abuse.  Reports cannabis use.    She also reports more than 10 years ago she had an episode of a headache that lasted about 4 days and was associated with residual defects of a strabismus in the left eye with associated visual disturbances.  She also reports over the past few years her friends have noticed that she sometimes has word-finding difficulty during conversations.     Reports shortness of breath when sitting up, fever, chills, back pain, lower extremity weakness(initally L>R, but now R>L), diarrhea.  Denies cough, nausea, vomiting, constipation, bladder or bowel  incontinence, dysuria, dark urine, new vision changes.    OSH course notable for concerning urinalysis and blood cultures positive for ESBL E coli treated with meropenem.  She was also admit to the ICU where she was treated with Precedex for significant body aches, irritability, and muscle spasms.  Concerns of a murmur on physical exam so she underwent TTE which did not show any vegetations.  Worsening lower extremity weakness workup with MRI head nonspecific, MRI cervical spine with multilevel spondylosis, X-ray spine with multilevel thoracolumbar fusion and diskectomy, focal kyphosis at T9-10 increased compared to prior.  She was started on prophylaxis amoxicillin due to her history of infected hardware.  MRI spine unable to be completed due to patient's body habitus so [place] [facility] [place] and Neurosurgery, Neurology evaluation.      Overview/Hospital Course:  Pt admitted as a transfer from [facility] for worsening LE weakness, concern for spinal infection, and need for MRI which could not be done at OSH. Imaging was completed here. Worsening proximal junctional kyphosis noted at T9-10 noted with concern for discitis at T8-9, T9-10. NSGY evaluated.     Found to have T8-T10 kyphosis on MRI. 1/24 underwent laminectomy T8-T10; posterior fusion T8-T12; and washout. 2/2 underwent T4-pelvis fusion and T9-T10 corpectomy.  ID consulted for thoracic discitis infection.  Patient to complete 8 weeks of therapy with meropenem. Patient underwent flap closure with Plastic surgery.  Step-down Hospital Medicine on 02/08.    2/9   On meropenem for MDR-ESCHERICHIA COLI ESBL  sodium trended down to 129.  Neurosurgery following post OR and aiding in pain management. PCA discontinued 2/8 . On oxycodone 10mg PO q4h . requiring IV dilaudid q4 as needed,. drains to gravity output 990ml/24h . Plastic surgery following flap, managing wounds.  Wound VAC discontinued on Wednesday.  accepted to [facility] pain  management consulted for back ache- switched to multimodal therapy. sodium trended down to 127.   2/10 sodium stable at 127 . continues with increased drain output 985ml/24h . negative balance of  2.8 L lasix on hold.  nephrology consulted. abdominal X ray -  Nonobstructive bowel gas pattern.  No free air.  As before, mild gaseous distension of stomach. ammonia at 57 . sono abdomen  . tylenol changed to 1000mg q12h with cirrhosis . sono abdomen -Cirrhotic liver morphology.  Sequela of portal hypertension including moderate ascites, recanalized umbilical vein.  No focal hepatic lesions. Cholecystectomy. . Nephrology recs  fluid restriction to 1 L,  lasix 60 mg IV daily,  and trend sodium q12h.  diazepam discontinued . s/p IR paracentesis today   2/11no evidence of SBP on paracentesis. 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 AFB and Triglyerides on ascitic fluid.  noncompliant with  fluid restriction to 1 L despite counseling. sodium trended down to 126 .received 2 doses of IV lasix 60mg. drain output 250ml. Increased Lasix IV 80 BID  due to UOP 500ml/24h   2/12 UOP of 1250ml/24h. K and P replaced. sodium trended up to 129.  hepatology eval -If ascitic fluid triglyceride level is elevated, needs IR  eval for lymphangiogram and surgery eval for  lymphatic repair. xanax 0.25mg BID PRN for anxiety   2/13 s/p paracentesis. Removed 4,700 ml. DVT sonogram LE negative. s/p psychiatry eval  for anxiety/depression. xanax discontinued. Decreased Bupropion to 150 mg PO Daily . Started Duloxetine 30 mg PO BID and  PRN Hydroxyzine 25 mg PO q8h for anxiety  2/14 Hb 6.9 Transfusion with 1 unit of PRBC . FOBT. sodium trended up to 130 . Diuresing well with lasix IV . DVT sonogram - Nonspecific fluid collection adjacent to the left iliac vein.  Recommend further evaluation with contrast enhanced CT scan . has Iodine contrast allergy- . 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  Diuresing well on IV lasix BID. negative balance of  13.6 L . IR eval -repeat paracentesis to trend volume output and  conservative management   with a low fat diet, octreotide, diuretics. Neurosurgery for input regarding chylous ascitis as possible neurosurgical complication or not. CT abdomen done overnight as well - Postop change in the spine.  There is lucency about the bi iliac screws concerning for loosening.  Bony destruction T10 with interbody strut. started on salt tablets 1g BID x 1day and lasix drip 10mg/h x 6h .  spironolactone  increased to 100 mg Qday. Continue IV Lasix. Dietary consulted for  high-protein and low-fat diet with medium-chain triglycerides  diet.  paracentesis 6.6L removed   2/16 Continuing gentle IV lasix with assistance from nephrology, remains fluid overloaded  2/17: continue Lasix infusion, responding well  2/18-2/19:  Please see progress notes from those dates.    2/18: D/w nephrology to transition from IV Lasix to IV bumex for further diuresis, though Na likely optimized as much as possible. D/w NSGY re xray prior to d/c ( sitting upright in the stretcher is fine.) D/w hepatology: IR wanting to defer lymphangiogram at this time.       Interval History: No events overnight. UOP 2775mL for last 24hrs.  Soft BP remained overnight despite IV fluid bolus yesterday.  Spironolactone discontinued for now.  Per discussion with Nephrology, IV Lasix transitioned to IV Bumex.  Patient reports suboptimal pain control; understands BP being mitigating factor.  She also thinks that her abdomen is starting to become distended again.    Review of Systems   Constitutional:  Positive for appetite change (so-so). Negative for chills and fever.   HENT:  Negative for congestion, sore throat and trouble swallowing.    Eyes:  Negative for visual  disturbance.   Respiratory:  Negative for cough, chest tightness and shortness of breath.    Cardiovascular:  Positive for leg swelling. Negative for chest pain.   Gastrointestinal:  Positive for abdominal distention. Negative for abdominal pain, constipation, diarrhea and nausea.   Genitourinary:  Negative for dysuria.   Musculoskeletal:  Positive for arthralgias.   Skin:  Negative for rash.   Neurological:  Negative for dizziness, weakness and light-headedness.   Psychiatric/Behavioral:  Negative for decreased concentration.    Objective:     Vital Signs (Most Recent):  Temp: 98.2 °F (36.8 °C) (02/20/23 1145)  Pulse: 97 (02/20/23 1145)  Resp: 16 (02/20/23 1245)  BP: (!) 114/55 (02/20/23 1145)  SpO2: 98 % (02/20/23 1145)   Vital Signs (24h Range):  Temp:  [98 °F (36.7 °C)-98.4 °F (36.9 °C)] 98.2 °F (36.8 °C)  Pulse:  [] 97  Resp:  [14-19] 16  SpO2:  [97 %-99 %] 98 %  BP: ()/(42-56) 114/55     Weight: (!) 142 kg (313 lb)  Body mass index is 49.02 kg/m².    Intake/Output Summary (Last 24 hours) at 2/20/2023 1422  Last data filed at 2/20/2023 0504  Gross per 24 hour   Intake --   Output 1100 ml   Net -1100 ml        Physical Exam  Vitals and nursing note reviewed.   Constitutional:       General: She is not in acute distress.     Appearance: She is well-developed. She is obese. She is ill-appearing. She is not toxic-appearing or diaphoretic.   HENT:      Head: Normocephalic and atraumatic.      Right Ear: External ear normal.      Left Ear: External ear normal.      Nose: Nose normal.      Mouth/Throat:      Mouth: Mucous membranes are moist.      Pharynx: Oropharynx is clear.   Eyes:      General: No scleral icterus.        Right eye: No discharge.   Cardiovascular:      Rate and Rhythm: Normal rate and regular rhythm.      Pulses: Normal pulses.      Heart sounds: Normal heart sounds.   Pulmonary:      Effort: Pulmonary effort is normal.      Breath sounds: Normal breath sounds.   Abdominal:       General: There is distension.      Palpations: Abdomen is soft.      Tenderness: There is no abdominal tenderness. There is no guarding or rebound.      Comments: 4 drains in place with serosanguinous fluid   Musculoskeletal:         General: No tenderness.      Cervical back: Neck supple.      Right lower leg: Edema present.      Left lower leg: Edema present.   Skin:     General: Skin is warm and dry.   Neurological:      Mental Status: She is alert and oriented to person, place, and time.   Psychiatric:         Behavior: Behavior normal.       Significant Labs: All pertinent labs within the past 24 hours have been reviewed.      Recent Results (from the past 24 hour(s))   Comprehensive Metabolic Panel    Collection Time: 02/20/23  3:51 AM   Result Value Ref Range    Sodium 129 (L) 136 - 145 mmol/L    Potassium 4.0 3.5 - 5.1 mmol/L    Chloride 94 (L) 95 - 110 mmol/L    CO2 31 (H) 23 - 29 mmol/L    Glucose 81 70 - 110 mg/dL    BUN 21 (H) 6 - 20 mg/dL    Creatinine 0.5 0.5 - 1.4 mg/dL    Calcium 7.9 (L) 8.7 - 10.5 mg/dL    Total Protein 4.9 (L) 6.0 - 8.4 g/dL    Albumin 1.8 (L) 3.5 - 5.2 g/dL    Total Bilirubin 1.3 (H) 0.1 - 1.0 mg/dL    Alkaline Phosphatase 150 (H) 55 - 135 U/L    AST 50 (H) 10 - 40 U/L    ALT 31 10 - 44 U/L    Anion Gap 4 (L) 8 - 16 mmol/L    eGFR >60.0 >60 mL/min/1.73 m^2   Magnesium    Collection Time: 02/20/23  3:51 AM   Result Value Ref Range    Magnesium 2.0 1.6 - 2.6 mg/dL   Phosphorus    Collection Time: 02/20/23  3:51 AM   Result Value Ref Range    Phosphorus 2.5 (L) 2.7 - 4.5 mg/dL   CBC Auto Differential    Collection Time: 02/20/23  3:53 AM   Result Value Ref Range    WBC 3.06 (L) 3.90 - 12.70 K/uL    RBC 2.91 (L) 4.00 - 5.40 M/uL    Hemoglobin 8.3 (L) 12.0 - 16.0 g/dL    Hematocrit 26.6 (L) 37.0 - 48.5 %    MCV 91 82 - 98 fL    MCH 28.5 27.0 - 31.0 pg    MCHC 31.2 (L) 32.0 - 36.0 g/dL    RDW 17.8 (H) 11.5 - 14.5 %    Platelets 92 (L) 150 - 450 K/uL    MPV 9.3 9.2 - 12.9 fL    Immature  Granulocytes 0.3 0.0 - 0.5 %    Gran # (ANC) 1.8 1.8 - 7.7 K/uL    Immature Grans (Abs) 0.01 0.00 - 0.04 K/uL    Lymph # 0.6 (L) 1.0 - 4.8 K/uL    Mono # 0.5 0.3 - 1.0 K/uL    Eos # 0.1 0.0 - 0.5 K/uL    Baso # 0.02 0.00 - 0.20 K/uL    nRBC 0 0 /100 WBC    Gran % 58.5 38.0 - 73.0 %    Lymph % 19.0 18.0 - 48.0 %    Mono % 17.3 (H) 4.0 - 15.0 %    Eosinophil % 4.2 0.0 - 8.0 %    Basophil % 0.7 0.0 - 1.9 %    Differential Method Automated    Protime-INR    Collection Time: 02/20/23 11:18 AM   Result Value Ref Range    Prothrombin Time 12.6 (H) 9.0 - 12.5 sec    INR 1.2 0.8 - 1.2       Significant Imaging: I have reviewed all pertinent imaging results/findings within the past 24 hours.            Assessment/Plan:      * Weakness of both lower extremities  42 year old woman s/p multiple spinal surgeries presented to OSH with possible infection of shoulder. Found to have UTI and E. Coli bacteremia and progressively worse BLE weakness. [place] [facility] [place]. Underwent Laminectomy T8-T10; Posterior spinal fusion T8-T12; hardware removal and washout on 1/24. Admitted to [facility] postop. On 2/2 underwent T4-pelvis fusion and T9-T10 corpectomies. To complete 8 week course of meropenem per ID for ESBL E coli from bone culture, end date 3/29.     - neuro checks q4h  - meropenem for ESBL bacteremia per ID, eot 3/29/23  - Plastic surgery follow, wound vac discontinued  - CMP, Mag, Phos, CBC daily  - MAP goals >65, SBP < 180  - PRN labetalol, hydralazine  - MM pain regimen: PRN Dilaudid, Tylenol, gabapentin, robaxin, PRN oxycodone  - Aggressive bowel regimen  - PT/OT     Hypotension  -multifactorial in the setting of narcotics and diuresis  - given bolus 500 x1 2/19, lactic acid negative  -continues to remain soft  - Will continue to monitor blood pressure trend closely and adjust medication regimen as clinically indicated and tolerated      E coli infection  See weakness of both lower extremities   Meropenem  for ESBL bacteremia per ID until 3/29      Thoracic discitis  See Weakness of both lower extremities    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  IR consulted  for lymphangiogram and  eval for  lymphatic repair  2/15  IR eval -repeat paracentesis to trend volume output and  conservative management    spironolactone  increased to 100 mg Qday. Continue IV Lasix. Dietary consulted for  high-protein and low-fat diet with medium-chain triglycerides  diet.     Ascites due to alcoholic cirrhosis  2/10  sono abdomen -Cirrhotic liver morphology.  Sequela of portal hypertension including moderate ascites, recanalized umbilical vein.  No focal hepatic lesions. Cholecystectomy. s/p IR paracentesis as above  2/11no evidence of SBP on paracentesis. 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 AFB and Triglyerides on ascitic fluid.    2/12  hepatology eval -If ascitic fluid triglyceride level is elevated, needs IR  eval for lymphangiogram and surgery eval for  lymphatic repair    Chronic hepatitis C with cirrhosis  See above    Cirrhosis of liver with ascites  MELD-Na score: 9 at 2/20/2023 11:18 AM  MELD score: 9 at 2/20/2023 11:18 AM  Calculated from:  Serum Creatinine: 0.5 mg/dL (Using min of 1 mg/dL) at 2/20/2023  3:51 AM  Serum Sodium: 129 mmol/L at 2/20/2023  3:51 AM  Total Bilirubin: 1.3 mg/dL at 2/20/2023  3:51 AM  INR(ratio): 1.2 at 2/20/2023 11:18 AM  Age: 42 years      Patient has reportedly refused transplant evaluation in the past.   - Daily CMP and trend LFTs  - Continue Lactulose 20 g TID  - Lasix 40 mg PO BID  - Will need Hepatology follow up outpatient  2/10   ammonia at 57 . sono abdomen  . tylenol changed to 1000mg q12h with cirrhosis . s/p IR paracentesis today   2/11no evidence of SBP on paracentesis. 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 AFB and  Triglyerides on ascitic fluid.    2/13 s/p paracentesis. Removed 4,700 ml. DVT sonogram LE negative.2/13 s/p paracentesis. Removed 4,700 ml. DVT sonogram LE negative. s/p psychiatry eval  for anxiety/depression. xanax discontinued. Decreased Bupropion to 150 mg PO Daily . Started Duloxetine 30 mg PO BID and  PRN Hydroxyzine 25 mg PO q8h for anxiety    Hyponatremia  - Patient with sodium   Recent Labs   Lab 02/19/23  0442 02/19/23  1255 02/20/23  0351   * 130* 129*   . sodium trending down    2/9 monitor on lasix BID.sodium trended down to 127.   2/10 sodium stable at 127 . continues with increased drain output 985ml/24h . negative balance of  2.8 L lasix on hold.  nephrology consulted.  Nephrology recs  fluid restriction to 1 L,  lasix 60 mg IV daily,  and trend sodium q12h.  diazepam discontinued   2/11  noncompliant with  fluid restriction to 1 L despite counseling. sodium trended down to 126 .received 2 doses of IV lasix 60mg. Increased Lasix IV 80 BID  due to UOP 500ml/24h   2/12 UOP of 1250ml/24h.sodium trended up to 129   2/14 sodium trended up to 130 . Diuresing well with lasix IV .   2/15 started on salt tablets 1g BID and lasix drip 10mg/h x 6h  2/16 continue Lasix 40 IVP x 1 with Lasix drip 10mg/hr x 6   2/17 sodium 134  2/18 sodium 131. On lasix drip . Increased from 5 to 10  2/19 sodium 130, lasix stopped   2/20 Bumex initiated per Nephrology recs    Acute blood loss anemia  Hb 6.4 on 2/6, received 1U pRBCs. Hb 2/7 WNL.  - monitor Hb  - transfuse to maintain Hb >7  2/9    Patient's with Normocytic anemia.. Hemoglobin stable. Etiology likely due to chronic disease .  Current CBC reviewed-    Recent Labs   Lab 02/19/23  0256 02/19/23  0442 02/19/23  1255   HGB 6.2* 8.2* 8.0*    Monitor CBC and transfuse if H/H drops below 7/21.   2/14 Hb 6.9 Transfusion with 1 unit of PRBC . FOBT negative  2/17: hgb 8.1, stable  2/18: hbg 8.2 stable   2/19 stable , hbg 8    Abdominal pain  2/10  abdominal X ray -   Nonobstructive bowel gas pattern.  No free air.  As before, mild gaseous distension of stomach. ammonia at 57 . sono abdomen with ascitis . simethicone PRN  2/11no evidence of SBP on paracentesis. 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 AFB and Triglyerides on ascitic fluid.      Kyphosis of thoracolumbar region  See weakness    Anxiety and depression  Evaluated by Psychiatry at OSH prior to transfer. Recommended increasing bupropion.  - Continue bupropion 300 mg QD  - Gabapentin 600 mg TID for anxiety and neuropathy  - PRN diazepam 5 mg q6h discontinued  2/13   s/p psychiatry eval  for anxiety/depression. xanax discontinued. Decreased Bupropion to 150 mg PO Daily . Started Duloxetine 30 mg PO BID and  PRN Hydroxyzine 25 mg PO q8h for anxiety    Severe obesity (BMI >= 40)  Body mass index is 48.58 kg/m². Morbid obesity complicates all aspects of disease management from diagnostic modalities to treatment. Weight loss encouraged    Substance use disorder  Denies IV drug use (meth) for past 3 years. Denies alcohol and tobacco abuse.     Pancytopenia  - acute on chronic  - follows with hematology outpatient  - CBC daily      VTE Risk Mitigation (From admission, onward)         Ordered     heparin (porcine) injection 5,000 Units  Every 8 hours         02/04/23 0848     IP VTE HIGH RISK PATIENT  Once         01/19/23 2153     Place sequential compression device  Until discontinued         01/19/23 2153     Reason for No Pharmacological VTE Prophylaxis  Once        Question:  Reasons:  Answer:  Physician Provided (leave comment)  Comment:  Potential NSGY procedure    01/19/23 2153                Discharge Planning   SHIRA: 2/24/2023     Code Status: Partial Code   Is the patient medically ready for discharge?: No    Reason for patient still in hospital (select all that apply): Patient trending condition, Consult recommendations and Pending disposition  Discharge Plan  A: Long-term acute care facility (LTAC)   Discharge Delays: None known at this time              Stephanie Nelson MD  Department of Hospital Medicine   [facility] - Telemetry Stepdown

## 2023-02-20 NOTE — PLAN OF CARE
Problem: Adult Inpatient Plan of Care  Goal: Plan of Care Review  Outcome: Ongoing, Progressing  Goal: Patient-Specific Goal (Individualized)  Outcome: Ongoing, Progressing  Goal: Absence of Hospital-Acquired Illness or Injury  Outcome: Ongoing, Progressing  Goal: Optimal Comfort and Wellbeing  Outcome: Ongoing, Progressing  Goal: Readiness for Transition of Care  Outcome: Ongoing, Progressing     Problem: Adjustment to Illness (Sepsis/Septic Shock)  Goal: Optimal Coping  Outcome: Ongoing, Progressing     Problem: Bleeding (Sepsis/Septic Shock)  Goal: Absence of Bleeding  Outcome: Ongoing, Progressing     Problem: Glycemic Control Impaired (Sepsis/Septic Shock)  Goal: Blood Glucose Level Within Desired Range  Outcome: Ongoing, Progressing     Problem: Infection Progression (Sepsis/Septic Shock)  Goal: Absence of Infection Signs and Symptoms  Outcome: Ongoing, Progressing

## 2023-02-20 NOTE — ASSESSMENT & PLAN NOTE
Pt is 42F multiple spinal surgeries and revisions last T10- Pelvis in March 2022 who presented to OSH with concern for shoulder infection and UTI found to have E coli sepsis who has had progressive worsening BLE weakness. 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 Transferred to [facility] to  and neurosurgery was consulted.    OR 1/24 for temporary T6-12 PSIF. Taz pus noted intraoperatively.   OR 2/2 for T4-pelvis revision and extension of fusion with T9-10 corpectomy with plastic surgery assistance.  MAP goals > 85 completed 2/5 in ICU.    Plan:  --Stepped down to  floor.   -q4h neurochecks.  --All labs & diagnostics reviewed.   - CT A/P w/ contrast 2/14: scattered peritoneal fluid, findings consistent with cirrhosis, splenomegaly. No enhancing masses or fluid collections. All visualized hardware in good position with some expected haloing around bilateral iliac screws (seen on prior imaging).   -Post op xrays pending. Try to obtain x-rays upright prior to discharge.  --TLSO to be worn when OOB only.  --MIHIR drains x4 per plastics - monitoring output. Following peripherally.  --Plastic surgery managing incision. Open to air.   - Off-load pressure as much as possible to promote wound healing.   - Elevate with wedge, alternate sides Q2H  --Thoracic discitis: BCx 1/20 no growth. OR cultures 1/24 grew ESBL E. coli   -- ID consulted. Recs for IV meropenem with estimated end date of 3/29/23.  --Pain Control: Continue multimodal regimen. PCA pump discontinued 2/8 with poor pain control subsequently.    - Anesthesia Pain Service consulted due to refractory pain, appreciate assistance. Continue current regimen.    - No NSAIDs as pt s/p recent instrumented fusion.  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 Do not suspect trauma to thoracic duct in relation to recent surgeries as etiology. Hepatology following. Other DDx could be  decompensation of liver disease vs TB (AFB culture stain negative).   - Management per Hepatology recs: spironolactone, IV lasix, dietary restrictions; paracentesis PRN  --Acute blood loss anemia: Goal Hbg > 7. Transfuse PRN.  --DVT PPx: SQH/TEDs/SCDs.   --NSGY will continue to follow peripherally. Please contact team with any further questions or acute changes in exam.

## 2023-02-20 NOTE — SUBJECTIVE & OBJECTIVE
Interval History: hypotensive episode over the weekend. Reports no PO intake  Received 500 cc NS bolus    Review of patient's allergies indicates:   Allergen Reactions    Bee venom protein (honey bee) Anaphylaxis     Patient reports she is allergic to bee stings.    Naproxen Anaphylaxis     Throat closing    12-23- Patient reports taking Ibuprofen 200 mg at home without problems. Verified X3. [place]    Wasp sting [allergen ext-venom-honey bee] Anaphylaxis    Adhesive Blisters    Shellfish containing products     Iodine and iodide containing products Hives and Itching     Allergic to iodine in seafood only    Nuts [tree nut] Rash     Current Facility-Administered Medications   Medication Frequency    acetaminophen tablet 1,000 mg Q12H    bisacodyL suppository 10 mg Every other day    buPROPion TB24 tablet 150 mg Daily    dextrose 10% bolus 125 mL 125 mL PRN    dextrose 10% bolus 250 mL 250 mL PRN    diphenhydrAMINE capsule 25 mg On Call Procedure    DULoxetine DR capsule 30 mg BID    furosemide injection 40 mg BID    gabapentin capsule 900 mg TID    glucagon (human recombinant) injection 1 mg PRN    glucose chewable tablet 16 g PRN    glucose chewable tablet 24 g PRN    heparin (porcine) injection 5,000 Units Q8H    hydrALAZINE tablet 25 mg Q8H PRN    HYDROmorphone injection 0.5 mg Q4H PRN    hydrOXYzine HCL tablet 25 mg TID PRN    lactulose 20 gram/30 mL solution Soln 20 g TID    melatonin tablet 6 mg Nightly PRN    meropenem (MERREM) 2 g in sodium chloride 0.9% 100 mL IVPB Q8H    methocarbamoL tablet 1,000 mg Q6H    naloxone 0.4 mg/mL injection 0.02 mg PRN    ondansetron injection 4 mg Q6H PRN    oxyCODONE immediate release tablet 5 mg Q3H PRN    And    oxyCODONE immediate release tablet Tab 10 mg Q3H PRN    pantoprazole EC tablet 40 mg Daily    polyethylene glycol packet 17 g Daily PRN    predniSONE tablet 50 mg On Call Procedure    prochlorperazine injection Soln 2.5 mg Q6H PRN    senna-docusate 8.6-50 mg per tablet  1 tablet BID PRN    simethicone chewable tablet 80 mg TID PRN    sodium chloride 0.9% flush 10 mL Q6H    And    sodium chloride 0.9% flush 10 mL PRN       Objective:     Vital Signs (Most Recent):  Temp: 98 °F (36.7 °C) (02/20/23 0738)  Pulse: 98 (02/20/23 1127)  Resp: 17 (02/20/23 1046)  BP: (!) 115/51 (02/20/23 0738)  SpO2: 98 % (02/20/23 1046)   Vital Signs (24h Range):  Temp:  [98 °F (36.7 °C)-98.4 °F (36.9 °C)] 98 °F (36.7 °C)  Pulse:  [] 98  Resp:  [14-19] 17  SpO2:  [97 %-99 %] 98 %  BP: ()/(42-58) 115/51     Weight: (!) 142 kg (313 lb) (02/20/23 0556)  Body mass index is 49.02 kg/m².  Body surface area is 2.59 meters squared.    I/O last 3 completed shifts:  In: -   Out: 4500 [Urine:3975; Drains:525]    Physical Exam  Constitutional:       General: She is not in acute distress.     Appearance: Normal appearance. She is obese. She is not ill-appearing.   HENT:      Head: Normocephalic and atraumatic.   Eyes:      General: No scleral icterus.  Cardiovascular:      Rate and Rhythm: Normal rate.      Pulses: Normal pulses.   Pulmonary:      Effort: Pulmonary effort is normal.   Abdominal:      General: There is distension.      Tenderness: There is abdominal tenderness. There is no guarding.   Musculoskeletal:      Right lower leg: Edema present.      Left lower leg: Edema present.   Skin:     General: Skin is warm and dry.      Coloration: Skin is not jaundiced.   Neurological:      Mental Status: She is alert and oriented to person, place, and time.       Significant Labs:  All labs within the past 24 hours have been reviewed.     Significant Imaging:  Labs: Reviewed

## 2023-02-20 NOTE — PROGRESS NOTES
[facility] - Telemetry Stepdown  Neurosurgery  Progress Note    Subjective:     History of Present Illness: Ms. Zazueta is a 42 y.o F w/ hx ofIV drug use (methamphetamine), chronic HCV with cirrhosis, spinal fusion (04/2021), epidural abscess with removal of hardware (02/2022), spinal fusion with hardware (T10 - Pelvis / 03/2022), obesity (BMI 39.3) and neurogenic bladder and recent shoulder surgery who initially presented to [facility] for evaluation of back pain and left leg weakness.  She reports initially noting the left leg weakness when she was about to go to the bathroom and was about to fall but was assisted by her boyfriend.  She was brought to the ED via EMS.  Urinalysis with UTI concerns and blood cultures at OSH grew ESBL E coli, and shoulder effusion concern for infection so she was treated with meropenem.  During hospitalization, she reports the weakness that started out in her left leg initially then spread upwards to her left thigh, left hip, then crossed over to the right hip and spread to her right leg.  Denies recent IV drug use. She reports her last incidence of drug use was more than 3 years ago and also denies tobacco, and alcohol abuse.  Reports cannabis use.     OSH course notable for concerning urinalysis and blood cultures positive for ESBL E coli treated with meropenem.  She was also admit to the ICU where she was treated with Precedex for significant body aches, irritability, and muscle spasms.  Concerns of a murmur on physical exam so she underwent TTE which did not show any vegetations.  Worsening lower extremity weakness workup with MRI head nonspecific, MRI cervical spine with multilevel spondylosis, X-ray spine with multilevel thoracolumbar fusion and diskectomy, focal kyphosis at T9-10 increased compared to prior.  She was started on prophylaxis amoxicillin due to her history of infected hardware.  MRI spine unable to be completed due to patient's body habitus so she was  [place] [facility] [place] and Neurosurgery, Neurology evaluation.      Post-Op Info:  Procedure(s) (LRB):  LAMINECTOMY, SPINE, THORACIC, WITH FUSION (T4-Pelvis fusion w/ T9-10 corpectomies) **AIRO (N/A)   18 Days Post-Op     Interval History: NAEON. Mental status improved. Endorses abdominal and back pain 2/2 not receiving pain medications since Saturday due to hypotension. Neuro exam stable to slightly improved with decreased LE edema. Plastics following for incision and drain management.    Medications:  Continuous Infusions:      Scheduled Meds:   acetaminophen  1,000 mg Oral Q12H    bisacodyL  10 mg Rectal Every other day    buPROPion  150 mg Oral Daily    DULoxetine  30 mg Oral BID    furosemide (LASIX) injection  40 mg Intravenous BID    gabapentin  900 mg Oral TID    heparin (porcine)  5,000 Units Subcutaneous Q8H    lactulose  20 g Oral TID    meropenem (MERREM) IVPB  2 g Intravenous Q8H    methocarbamoL  1,000 mg Oral Q6H    pantoprazole  40 mg Oral Daily    sodium chloride 0.9%  10 mL Intravenous Q6H     PRN Meds:dextrose 10%, dextrose 10%, diphenhydrAMINE, glucagon (human recombinant), glucose, glucose, hydrALAZINE, HYDROmorphone, hydrOXYzine HCL, melatonin, naloxone, ondansetron, oxyCODONE **AND** oxyCODONE, polyethylene glycol, predniSONE, prochlorperazine, senna-docusate 8.6-50 mg, simethicone, Flushing PICC Protocol **AND** sodium chloride 0.9% **AND** sodium chloride 0.9%     Review of Systems  Objective:     Weight: (!) 142 kg (313 lb)  Body mass index is 49.02 kg/m².  Vital Signs (Most Recent):  Temp: 98 °F (36.7 °C) (02/20/23 0738)  Pulse: 103 (02/20/23 0738)  Resp: 14 (02/20/23 0349)  BP: (!) 115/51 (02/20/23 0738)  SpO2: 99 % (02/20/23 0738)   Vital Signs (24h Range):  Temp:  [98 °F (36.7 °C)-98.4 °F (36.9 °C)] 98 °F (36.7 °C)  Pulse:  [] 103  Resp:  [14-19] 14  SpO2:  [96 %-99 %] 99 %  BP: ()/(37-58) 115/51                            Closed/Suction Drain  02/02/23 1401 Right Back Bulb 15 Fr. (Active)   Site Description Healing 02/14/23 2100   Dressing Type Other (Comment) 02/10/23 2000   Dressing Status Clean;Dry;Intact 02/14/23 2100   Dressing Intervention Integrity maintained 02/14/23 2100   Drainage Serosanguineous 02/14/23 2100   Status Open to gravity drainage 02/14/23 2100   Output (mL) 50 mL 02/14/23 1800            Closed/Suction Drain 02/02/23 1402 Right Back Bulb 15 Fr. (Active)   Site Description Healing 02/14/23 2100   Dressing Type Other (Comment) 02/13/23 1958   Dressing Status Intact;Dry;Clean 02/14/23 2100   Dressing Intervention Integrity maintained 02/14/23 2100   Drainage Serosanguineous 02/14/23 2100   Status Open to gravity drainage 02/14/23 2100   Output (mL) 50 mL 02/14/23 1800            Closed/Suction Drain 02/02/23 1402 Left Back Bulb 15 Fr. (Active)   Site Description Edema/swelling 02/13/23 1958   Dressing Type Other (Comment) 02/13/23 1958   Dressing Status Clean;Dry;Intact 02/13/23 1958   Dressing Intervention Integrity maintained 02/13/23 1958   Drainage Serosanguineous 02/13/23 1958   Status Open to gravity drainage 02/13/23 1958   Output (mL) 50 mL 02/14/23 1800            Closed/Suction Drain 02/02/23 1402 Left Back Bulb 15 Fr. (Active)   Site Description Other (Comment) 02/13/23 1958   Dressing Type Other (Comment) 02/13/23 1958   Dressing Status Intact 02/13/23 1958   Dressing Intervention Integrity maintained 02/13/23 1958   Drainage Serosanguineous 02/13/23 1958   Status Open to gravity drainage 02/13/23 1958   Output (mL) 50 mL 02/14/23 1800       Female External Urinary Catheter 02/10/23 2323 (Active)   Skin no breakdown;no redness;female external urine collection device repositioned 02/14/23 2100   Tolerance no signs/symptoms of discomfort 02/14/23 2100   Suction Continuous suction at 60 mmHg 02/14/23 2100   Date of last wick change 02/14/23 02/14/23 2236   Time of last wick change 2236 02/14/23 2236   Output (mL) 1800 mL  02/14/23 1800       Physical Exam    Neurosurgery Physical Exam    General: well developed, well nourished, no distress.   Head: normocephalic, atraumatic  Neurologic: Alert and oriented. Thought content appropriate.  GCS: Motor: 6/Verbal: 5/Eyes: 4 GCS Total: 15  Mental Status: Awake, Alert, Oriented x 4  Language: No aphasia  Speech: No dysarthria  Cranial nerves: face symmetric, tongue midline, CN II-XII grossly intact.   Eyes: pupils equal, round, reactive to light with accommodation, EOMI. Dysconjugate gaze.  Pulmonary: normal respirations, no signs of respiratory distress  Skin: Skin is warm, dry and intact.  Sensory: intact to light touch throughout  Motor Strength: Moves all extremities spontaneously.     Strength   Deltoids Triceps Biceps Wrist Extension Wrist Flexion Hand    Upper: R 5/5 5/5 5/5 5/5 5/5 5/5     L 5/5 5/5 5/5 5/5 5/5 5/5       Iliopsoas Quadriceps Knee  Flexion Tibialis  anterior Gastro- cnemius EHL   Lower: R 2/5 2/5 2/5 3/5 3/5 2/5     L 2/5 2/5 2/5 3/5 3/5 3/5      BLE strength/ROM limited due to pain, body habitus and severe bilateral leg edema.       Significant Labs:  Recent Labs   Lab 02/19/23  0256 02/19/23  0442 02/19/23  1255 02/20/23  0351   GLU 72 116* 96 81    131* 130* 129*   K 2.6* 3.7 3.7 4.0   * 93* 94* 94*   CO2 22* 30* 31* 31*   BUN 16 22* 20 21*   CREATININE 0.4* 0.6 0.5 0.5   CALCIUM 5.2* 7.7* 7.7* 7.9*   MG 1.2* 1.7  --  2.0       Recent Labs   Lab 02/19/23  0442 02/19/23  1255 02/20/23  0353   WBC 2.94* 3.27* 3.06*   HGB 8.2* 8.0* 8.3*   HCT 27.8* 25.7* 26.6*   PLT 87* 82* 92*       No results for input(s): LABPT, INR, APTT in the last 48 hours.  Microbiology Results (last 7 days)       Procedure Component Value Units Date/Time    Aerobic culture [[id number]] Collected: 02/15/23 1445    Order Status: Completed Specimen: Ascites Updated: 02/18/23 0738     Aerobic Bacterial Culture No growth    Culture, Anaerobic [[id number]] Collected: 02/15/23 1446     Order Status: Completed Specimen: Ascites Updated: 02/17/23 1104     Anaerobic Culture Culture in progress    Culture, Anaerobic [[id number]] Collected: 02/13/23 1529    Order Status: Completed Specimen: Ascites Updated: 02/17/23 0753     Anaerobic Culture Culture in progress    Aerobic culture [[id number]] Collected: 02/13/23 1529    Order Status: Completed Specimen: Ascites Updated: 02/16/23 1058     Aerobic Bacterial Culture No growth    Gram stain [[id number]] Collected: 02/15/23 1445    Order Status: Completed Specimen: Ascites Updated: 02/15/23 2118     Gram Stain Result Rare WBC's      No organisms seen    Culture, Anaerobic [[id number]] Collected: 02/10/23 1430    Order Status: Completed Specimen: Ascites Updated: 02/15/23 1034     Anaerobic Culture Culture in progress    AFB Culture & Smear [[id number]] Collected: 02/13/23 1529    Order Status: Completed Specimen: Ascites Updated: 02/14/23 2127     AFB Culture & Smear Culture in progress     AFB CULTURE STAIN No acid fast bacilli seen.    Aerobic culture [[id number]] Collected: 02/10/23 1430    Order Status: Completed Specimen: Ascites Updated: 02/14/23 1036     Aerobic Bacterial Culture No growth    Fungus culture [[id number]] Collected: 02/13/23 1529    Order Status: Completed Specimen: Ascites Updated: 02/14/23 0936     Fungus (Mycology) Culture Culture in progress    Gram stain [[id number]] Collected: 02/13/23 1529    Order Status: Completed Specimen: Ascites Updated: 02/13/23 1814     Gram Stain Result Rare WBC's      No organisms seen          All pertinent labs from the last 24 hours have been reviewed.    Significant Diagnostics:  CT: CT Abdomen Pelvis With Contrast    Result Date: 2/15/2023  Findings consistent with cirrhosis.  Splenomegaly, Recanalized umbilical vein, scattered peritoneal fluid, and small nodular liver noted. Nonobstructing left renal stones. Postop change in the spine.  There is lucency about the bi iliac screws concerning for loosening.   Bony destruction T10 with interbody strut. Additional findings above. Electronically signed by: Celio Zuniga MD Date:    02/15/2023 Time:    07:51     I have reviewed and interpreted all pertinent imaging results/findings within the past 24 hours.    Assessment/Plan:     * Weakness of both lower extremities  Pt is 42F multiple spinal surgeries and revisions last T10- Pelvis in March 2022 who presented to OSH with concern for shoulder infection and UTI found to have E coli sepsis who has had progressive worsening BLE weakness. 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 Transferred to [facility] to  and neurosurgery was consulted.    OR 1/24 for temporary T6-12 PSIF. Taz pus noted intraoperatively.   OR 2/2 for T4-pelvis revision and extension of fusion with T9-10 corpectomy with plastic surgery assistance.  MAP goals > 85 completed 2/5 in ICU.    Plan:  --Stepped down to  floor.   -q4h neurochecks.  --All labs & diagnostics reviewed.   - CT A/P w/ contrast 2/14: scattered peritoneal fluid, findings consistent with cirrhosis, splenomegaly. No enhancing masses or fluid collections. All visualized hardware in good position with some expected haloing around bilateral iliac screws (seen on prior imaging).   -Post op xrays pending. Try to obtain x-rays upright prior to discharge.  --TLSO to be worn when OOB only.  --MIHIR drains x4 per plastics - monitoring output. Following peripherally.  --Plastic surgery managing incision. Open to air.   - Off-load pressure as much as possible to promote wound healing.   - Elevate with wedge, alternate sides Q2H  --Thoracic discitis: BCx 1/20 no growth. OR cultures 1/24 grew ESBL E. coli   -- ID consulted. Recs for IV meropenem with estimated end date of 3/29/23.  --Pain Control: Continue multimodal regimen. PCA pump discontinued 2/8 with poor pain control subsequently.    - Anesthesia Pain Service consulted due to refractory pain, appreciate assistance. Continue  current regimen.    - No NSAIDs as pt s/p recent instrumented fusion.  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 Do not suspect trauma to thoracic duct in relation to recent surgeries as etiology. Hepatology following. Other DDx could be decompensation of liver disease vs TB (AFB culture stain negative).   - Management per Hepatology recs: spironolactone, IV lasix, dietary restrictions; paracentesis PRN  --Acute blood loss anemia: Goal Hbg > 7. Transfuse PRN.  --DVT PPx: SQH/TEDs/SCDs.   --NSGY will continue to follow peripherally. Please contact team with any further questions or acute changes in exam.        Lazara Yost PA-C  Neurosurgery  [facility] - Telemetry Stepdown

## 2023-02-20 NOTE — SUBJECTIVE & OBJECTIVE
Interval History: NAEON. Mental status improved. Endorses abdominal and back pain 2/2 not receiving pain medications since Saturday due to hypotension. Neuro exam stable to slightly improved with decreased LE edema. Plastics following for incision and drain management.    Medications:  Continuous Infusions:      Scheduled Meds:   acetaminophen  1,000 mg Oral Q12H    bisacodyL  10 mg Rectal Every other day    buPROPion  150 mg Oral Daily    DULoxetine  30 mg Oral BID    furosemide (LASIX) injection  40 mg Intravenous BID    gabapentin  900 mg Oral TID    heparin (porcine)  5,000 Units Subcutaneous Q8H    lactulose  20 g Oral TID    meropenem (MERREM) IVPB  2 g Intravenous Q8H    methocarbamoL  1,000 mg Oral Q6H    pantoprazole  40 mg Oral Daily    sodium chloride 0.9%  10 mL Intravenous Q6H     PRN Meds:dextrose 10%, dextrose 10%, diphenhydrAMINE, glucagon (human recombinant), glucose, glucose, hydrALAZINE, HYDROmorphone, hydrOXYzine HCL, melatonin, naloxone, ondansetron, oxyCODONE **AND** oxyCODONE, polyethylene glycol, predniSONE, prochlorperazine, senna-docusate 8.6-50 mg, simethicone, Flushing PICC Protocol **AND** sodium chloride 0.9% **AND** sodium chloride 0.9%     Review of Systems  Objective:     Weight: (!) 142 kg (313 lb)  Body mass index is 49.02 kg/m².  Vital Signs (Most Recent):  Temp: 98 °F (36.7 °C) (02/20/23 0738)  Pulse: 103 (02/20/23 0738)  Resp: 14 (02/20/23 0349)  BP: (!) 115/51 (02/20/23 0738)  SpO2: 99 % (02/20/23 0738)   Vital Signs (24h Range):  Temp:  [98 °F (36.7 °C)-98.4 °F (36.9 °C)] 98 °F (36.7 °C)  Pulse:  [] 103  Resp:  [14-19] 14  SpO2:  [96 %-99 %] 99 %  BP: ()/(37-58) 115/51                            Closed/Suction Drain 02/02/23 1401 Right Back Bulb 15 Fr. (Active)   Site Description Healing 02/14/23 2100   Dressing Type Other (Comment) 02/10/23 2000   Dressing Status Clean;Dry;Intact 02/14/23 2100   Dressing Intervention Integrity maintained 02/14/23 2100   Drainage  Serosanguineous 02/14/23 2100   Status Open to gravity drainage 02/14/23 2100   Output (mL) 50 mL 02/14/23 1800            Closed/Suction Drain 02/02/23 1402 Right Back Bulb 15 Fr. (Active)   Site Description Healing 02/14/23 2100   Dressing Type Other (Comment) 02/13/23 1958   Dressing Status Intact;Dry;Clean 02/14/23 2100   Dressing Intervention Integrity maintained 02/14/23 2100   Drainage Serosanguineous 02/14/23 2100   Status Open to gravity drainage 02/14/23 2100   Output (mL) 50 mL 02/14/23 1800            Closed/Suction Drain 02/02/23 1402 Left Back Bulb 15 Fr. (Active)   Site Description Edema/swelling 02/13/23 1958   Dressing Type Other (Comment) 02/13/23 1958   Dressing Status Clean;Dry;Intact 02/13/23 1958   Dressing Intervention Integrity maintained 02/13/23 1958   Drainage Serosanguineous 02/13/23 1958   Status Open to gravity drainage 02/13/23 1958   Output (mL) 50 mL 02/14/23 1800            Closed/Suction Drain 02/02/23 1402 Left Back Bulb 15 Fr. (Active)   Site Description Other (Comment) 02/13/23 1958   Dressing Type Other (Comment) 02/13/23 1958   Dressing Status Intact 02/13/23 1958   Dressing Intervention Integrity maintained 02/13/23 1958   Drainage Serosanguineous 02/13/23 1958   Status Open to gravity drainage 02/13/23 1958   Output (mL) 50 mL 02/14/23 1800       Female External Urinary Catheter 02/10/23 2323 (Active)   Skin no breakdown;no redness;female external urine collection device repositioned 02/14/23 2100   Tolerance no signs/symptoms of discomfort 02/14/23 2100   Suction Continuous suction at 60 mmHg 02/14/23 2100   Date of last wick change 02/14/23 02/14/23 2236   Time of last wick change 2236 02/14/23 2236   Output (mL) 1800 mL 02/14/23 1800       Physical Exam    Neurosurgery Physical Exam    General: well developed, well nourished, no distress.   Head: normocephalic, atraumatic  Neurologic: Alert and oriented. Thought content appropriate.  GCS: Motor: 6/Verbal: 5/Eyes: 4 GCS  Total: 15  Mental Status: Awake, Alert, Oriented x 4  Language: No aphasia  Speech: No dysarthria  Cranial nerves: face symmetric, tongue midline, CN II-XII grossly intact.   Eyes: pupils equal, round, reactive to light with accommodation, EOMI. Dysconjugate gaze.  Pulmonary: normal respirations, no signs of respiratory distress  Skin: Skin is warm, dry and intact.  Sensory: intact to light touch throughout  Motor Strength: Moves all extremities spontaneously.     Strength   Deltoids Triceps Biceps Wrist Extension Wrist Flexion Hand    Upper: R 5/5 5/5 5/5 5/5 5/5 5/5     L 5/5 5/5 5/5 5/5 5/5 5/5       Iliopsoas Quadriceps Knee  Flexion Tibialis  anterior Gastro- cnemius EHL   Lower: R 2/5 2/5 2/5 3/5 3/5 2/5     L 2/5 2/5 2/5 3/5 3/5 3/5      BLE strength/ROM limited due to pain, body habitus and severe bilateral leg edema.       Significant Labs:  Recent Labs   Lab 02/19/23  0256 02/19/23  0442 02/19/23  1255 02/20/23  0351   GLU 72 116* 96 81    131* 130* 129*   K 2.6* 3.7 3.7 4.0   * 93* 94* 94*   CO2 22* 30* 31* 31*   BUN 16 22* 20 21*   CREATININE 0.4* 0.6 0.5 0.5   CALCIUM 5.2* 7.7* 7.7* 7.9*   MG 1.2* 1.7  --  2.0       Recent Labs   Lab 02/19/23  0442 02/19/23  1255 02/20/23  0353   WBC 2.94* 3.27* 3.06*   HGB 8.2* 8.0* 8.3*   HCT 27.8* 25.7* 26.6*   PLT 87* 82* 92*       No results for input(s): LABPT, INR, APTT in the last 48 hours.  Microbiology Results (last 7 days)       Procedure Component Value Units Date/Time    Aerobic culture [[id number]] Collected: 02/15/23 1445    Order Status: Completed Specimen: Ascites Updated: 02/18/23 0738     Aerobic Bacterial Culture No growth    Culture, Anaerobic [[id number]] Collected: 02/15/23 1445    Order Status: Completed Specimen: Ascites Updated: 02/17/23 1104     Anaerobic Culture Culture in progress    Culture, Anaerobic [[id number]] Collected: 02/13/23 1529    Order Status: Completed Specimen: Ascites Updated: 02/17/23 0753     Anaerobic Culture  Culture in progress    Aerobic culture [[id number]] Collected: 02/13/23 1529    Order Status: Completed Specimen: Ascites Updated: 02/16/23 1058     Aerobic Bacterial Culture No growth    Gram stain [[id number]] Collected: 02/15/23 1445    Order Status: Completed Specimen: Ascites Updated: 02/15/23 2118     Gram Stain Result Rare WBC's      No organisms seen    Culture, Anaerobic [[id number]] Collected: 02/10/23 1430    Order Status: Completed Specimen: Ascites Updated: 02/15/23 1034     Anaerobic Culture Culture in progress    AFB Culture & Smear [[id number]] Collected: 02/13/23 1529    Order Status: Completed Specimen: Ascites Updated: 02/14/23 2127     AFB Culture & Smear Culture in progress     AFB CULTURE STAIN No acid fast bacilli seen.    Aerobic culture [[id number]] Collected: 02/10/23 1430    Order Status: Completed Specimen: Ascites Updated: 02/14/23 1036     Aerobic Bacterial Culture No growth    Fungus culture [[id number]] Collected: 02/13/23 1529    Order Status: Completed Specimen: Ascites Updated: 02/14/23 0936     Fungus (Mycology) Culture Culture in progress    Gram stain [[id number]] Collected: 02/13/23 1529    Order Status: Completed Specimen: Ascites Updated: 02/13/23 1814     Gram Stain Result Rare WBC's      No organisms seen          All pertinent labs from the last 24 hours have been reviewed.    Significant Diagnostics:  CT: CT Abdomen Pelvis With Contrast    Result Date: 2/15/2023  Findings consistent with cirrhosis.  Splenomegaly, Recanalized umbilical vein, scattered peritoneal fluid, and small nodular liver noted. Nonobstructing left renal stones. Postop change in the spine.  There is lucency about the bi iliac screws concerning for loosening.  Bony destruction T10 with interbody strut. Additional findings above. Electronically signed by: Celio Zuniga MD Date:    02/15/2023 Time:    07:51     I have reviewed and interpreted all pertinent imaging results/findings within the past 24 hours.

## 2023-02-20 NOTE — ASSESSMENT & PLAN NOTE
-multifactorial in the setting of narcotics and diuresis  - given bolus 500 x1 2/19, lactic acid negative  -continues to remain soft  - Will continue to monitor blood pressure trend closely and adjust medication regimen as clinically indicated and tolerated

## 2023-02-20 NOTE — PLAN OF CARE
Plan of care reviewed with pt. Pt aaox.4. Pt drowsy most of the day. Pt became hypotensive, 500 cc bolus. Lasix DC. Staples in tact. 4 MIHIR drains in place. Pt remained free of falls, trauma, and injury. Will continue to monitor.

## 2023-02-20 NOTE — ASSESSMENT & PLAN NOTE
42 y.o F w/ hx of IV drug use (meth), chronic HCV with cirrhosis, spinal fusion (04/2021), epidural abscess with removal of hardware (02/2022), spinal fusion with hardware (T10 - Pelvis / 03/2022), and neurogenic bladder here with complicated ESBL E.coli epidural abscess s/p washout,corpectomy, fixation being treated w/ meropenem.     Nephrology consulted for hyponatremia. Patient reports uncomfortable abdominal distension and leg swelling. Reports drinking 3-4 cups of her 30oz water tumbler daily. Urine Na <10, urine osm 513. Serum osm 283. Serum Na stable. Normal mentation.    Hyponatremia likely 2/2 to low effective circulating volume 2/2 cirrhosis. Possible poor solute intake relative to free water intake. Unlikely SIADH given low urine Na.   Repeat urine studies 2/14 suggestive of appropriate ADH in setting of cirrhosis    Change lasix to bumex 1 mg for better longer lasting bioavailably.    -this may be her new baseline sodium, short of more drastic measures where risk outweighs benefit.  Continue fluid restriction, though needs to have at least 500 cc intake

## 2023-02-20 NOTE — ASSESSMENT & PLAN NOTE
MELD-Na score: 9 at 2/20/2023 11:18 AM  MELD score: 9 at 2/20/2023 11:18 AM  Calculated from:  Serum Creatinine: 0.5 mg/dL (Using min of 1 mg/dL) at 2/20/2023  3:51 AM  Serum Sodium: 129 mmol/L at 2/20/2023  3:51 AM  Total Bilirubin: 1.3 mg/dL at 2/20/2023  3:51 AM  INR(ratio): 1.2 at 2/20/2023 11:18 AM  Age: 42 years      Patient has reportedly refused transplant evaluation in the past.   - Daily CMP and trend LFTs  - Continue Lactulose 20 g TID  - Lasix 40 mg PO BID  - Will need Hepatology follow up outpatient  2/10   ammonia at 57 . sono abdomen  . tylenol changed to 1000mg q12h with cirrhosis . s/p IR paracentesis today   2/11no evidence of SBP on paracentesis. 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 AFB and Triglyerides on ascitic fluid.    2/13 s/p paracentesis. Removed 4,700 ml. DVT sonogram LE negative.2/13 s/p paracentesis. Removed 4,700 ml. DVT sonogram LE negative. s/p psychiatry eval  for anxiety/depression. xanax discontinued. Decreased Bupropion to 150 mg PO Daily . Started Duloxetine 30 mg PO BID and  PRN Hydroxyzine 25 mg PO q8h for anxiety   no known allergies

## 2023-02-20 NOTE — ASSESSMENT & PLAN NOTE
42 year old woman s/p multiple spinal surgeries presented to OSH with possible infection of shoulder. Found to have UTI and E. Coli bacteremia and progressively worse BLE weakness. [place] [facility] [place]. Underwent Laminectomy T8-T10; Posterior spinal fusion T8-T12; hardware removal and washout on 1/24. Admitted to [facility] postop. On 2/2 underwent T4-pelvis fusion and T9-T10 corpectomies. To complete 8 week course of meropenem per ID for ESBL E coli from bone culture, end date 3/29.     - neuro checks q4h  - meropenem for ESBL bacteremia per ID, eot 3/29/23  - Plastic surgery follow, wound vac discontinued  - CMP, Mag, Phos, CBC daily  - MAP goals >65, SBP < 180  - PRN labetalol, hydralazine  - MM pain regimen: PRN Dilaudid, Tylenol, gabapentin, robaxin, PRN oxycodone  - Aggressive bowel regimen  - PT/OT

## 2023-02-20 NOTE — PT/OT/SLP PROGRESS
Physical Therapy Treatment    Patient Name:  Rachel Zazueta   MRN:  [id number]    Recommendations:     Discharge Recommendations: nursing facility, skilled  Discharge Equipment Recommendations: hospital bed, lift device, wheelchair  Barriers to discharge: Inaccessible home, Decreased caregiver support, and impaired functional mobility requiring increased assistance    Assessment:     Rachel Zazueta is a 42 y.o. female admitted with a medical diagnosis of Weakness of both lower extremities.  She presents with the following impairments/functional limitations: weakness, impaired endurance, impaired self care skills, impaired functional mobility, gait instability, impaired balance, decreased coordination, decreased upper extremity function, decreased lower extremity function, decreased safety awareness, pain, orthopedic precautions, edema.    Pt tolerates session well with focus on bed mobility, transfers, therex, and EOB balance/endurance. Pt progressing well with significant improvement in sitting balance, posture at EOB, and pt attempts standing. Unable to reach full upright stand without assist of 2nd person. Pt continues to require significant physical assistance to mobilize but does demonstrate trending improvement. Pt progressing slowly towards therapy goals. Pt will continue to benefit from therapy services to address impairments listed above.     Rehab Prognosis: Good; patient would benefit from acute skilled PT services to address these deficits and reach maximum level of function.    Recent Surgery: Procedure(s) (LRB):  LAMINECTOMY, SPINE, THORACIC, WITH FUSION (T4-Pelvis fusion w/ T9-10 corpectomies) **AIRO (N/A) 18 Days Post-Op    Plan:     During this hospitalization, patient to be seen 3 x/week to address the identified rehab impairments via therapeutic activities, therapeutic exercises, neuromuscular re-education and progress toward the following goals:    Plan of Care Expires:   "03/05/23    Subjective     Chief Complaint: pain  Patient/Family Comments/goals: "I'm doing pretty good today. I've got my food here so it's going to be a lot better."  Pain/Comfort:  Pain Rating 1: other (see comments) (unrated; states "it's tolerable")  Location - Orientation 1: generalized  Location 1: back (and abdomen)  Pain Addressed 1: Pre-medicate for activity, Reposition, Distraction  Pain Rating Post-Intervention 1: other (see comments) (unrated)      Objective:     Communicated with RN prior to session.  Patient found  supine, HOB minimally elevated  with telemetry, PureWick, MIHIR drain upon PTA entry to room.     General Precautions: Standard, fall, contact  Orthopedic Precautions: spinal precautions  Braces: TLSO  Respiratory Status: Room air     Functional Mobility:  Bed Mobility:     Rolling Left:  maximal assistance  Supine to Sit: maximal assistance   Sit to Supine: maximal assistance   Transfers:     Sit to Stand:  maximal assistance with hand-held assist; x 2 attempts, 1 partial stand, unable to complete hip extension to reach full upright stand      AM-PAC 6 CLICK MOBILITY  Turning over in bed (including adjusting bedclothes, sheets and blankets)?: 2  Sitting down on and standing up from a chair with arms (e.g., wheelchair, bedside commode, etc.): 2  Moving from lying on back to sitting on the side of the bed?: 2  Moving to and from a bed to a chair (including a wheelchair)?: 1  Need to walk in hospital room?: 1  Climbing 3-5 steps with a railing?: 1  Basic Mobility Total Score: 9       Treatment & Education:  Pt assisted at trunk with cues for core/trunk activation for upright posture and midline balance. Additional cues for anterior weight shift as pt with tendency for posterior lean. SBA to Mod A dependent on level of UE support available.   JARRELL and BUE reaching to challenge pts core/trunk activation and midline balance.   Increased time spent in encouragement and therapeutic listening to " improve patients comfort and participation in therapy interventions.     Patient left HOB elevated with all lines intact, call button in reach, and RN notified.     GOALS:   Multidisciplinary Problems       Physical Therapy Goals          Problem: Physical Therapy    Goal Priority Disciplines Outcome Goal Variances Interventions   Physical Therapy Goal     PT, PT/OT Ongoing, Progressing     Description: Goals to be met by: 23, extended goals to      Patient will increase functional independence with mobility by performin. Supine to sit with Moderate Assistance  2. Sit to supine with Moderate Assistance  3. Rolling to Left and Right with Moderate Assistance.  4. Sit to stand transfer with Maximum Assistance  5. Sit edge of bed with ANTONIO UE support for 8 min with contact guard assist to prepare for functional activities in sitting.                          Time Tracking:     PT Received On: 23  PT Start Time: 1405     PT Stop Time: 1445  PT Total Time (min): 40 min     Billable Minutes: Therapeutic Activity 40    Treatment Type: Treatment  PT/PTA: PTA     PTA Visit Number: 2     2023

## 2023-02-21 LAB
ALBUMIN SERPL BCP-MCNC: 1.9 G/DL (ref 3.5–5.2)
ALP SERPL-CCNC: 171 U/L (ref 55–135)
ALT SERPL W/O P-5'-P-CCNC: 33 U/L (ref 10–44)
ANION GAP SERPL CALC-SCNC: 4 MMOL/L (ref 8–16)
AST SERPL-CCNC: 53 U/L (ref 10–40)
BASOPHILS # BLD AUTO: 0.04 K/UL (ref 0–0.2)
BASOPHILS NFR BLD: 1.1 % (ref 0–1.9)
BILIRUB SERPL-MCNC: 1.1 MG/DL (ref 0.1–1)
BUN SERPL-MCNC: 22 MG/DL (ref 6–20)
CALCIUM SERPL-MCNC: 7.9 MG/DL (ref 8.7–10.5)
CHLORIDE SERPL-SCNC: 93 MMOL/L (ref 95–110)
CO2 SERPL-SCNC: 30 MMOL/L (ref 23–29)
CREAT SERPL-MCNC: 0.5 MG/DL (ref 0.5–1.4)
DIFFERENTIAL METHOD: ABNORMAL
EOSINOPHIL # BLD AUTO: 0.2 K/UL (ref 0–0.5)
EOSINOPHIL NFR BLD: 4.8 % (ref 0–8)
ERYTHROCYTE [DISTWIDTH] IN BLOOD BY AUTOMATED COUNT: 17.5 % (ref 11.5–14.5)
EST. GFR  (NO RACE VARIABLE): >60 ML/MIN/1.73 M^2
GLUCOSE SERPL-MCNC: 87 MG/DL (ref 70–110)
HCT VFR BLD AUTO: 28 % (ref 37–48.5)
HGB BLD-MCNC: 8.6 G/DL (ref 12–16)
IMM GRANULOCYTES # BLD AUTO: 0.01 K/UL (ref 0–0.04)
IMM GRANULOCYTES NFR BLD AUTO: 0.3 % (ref 0–0.5)
INR PPP: 1.2 (ref 0.8–1.2)
LYMPHOCYTES # BLD AUTO: 0.6 K/UL (ref 1–4.8)
LYMPHOCYTES NFR BLD: 15.6 % (ref 18–48)
MAGNESIUM SERPL-MCNC: 2 MG/DL (ref 1.6–2.6)
MCH RBC QN AUTO: 27.9 PG (ref 27–31)
MCHC RBC AUTO-ENTMCNC: 30.7 G/DL (ref 32–36)
MCV RBC AUTO: 91 FL (ref 82–98)
MONOCYTES # BLD AUTO: 0.6 K/UL (ref 0.3–1)
MONOCYTES NFR BLD: 16.5 % (ref 4–15)
NEUTROPHILS # BLD AUTO: 2.2 K/UL (ref 1.8–7.7)
NEUTROPHILS NFR BLD: 61.7 % (ref 38–73)
NRBC BLD-RTO: 0 /100 WBC
PHOSPHATE SERPL-MCNC: 3.2 MG/DL (ref 2.7–4.5)
PLATELET # BLD AUTO: 94 K/UL (ref 150–450)
PMV BLD AUTO: 9.3 FL (ref 9.2–12.9)
POTASSIUM SERPL-SCNC: 3.9 MMOL/L (ref 3.5–5.1)
PROT SERPL-MCNC: 5.2 G/DL (ref 6–8.4)
PROTHROMBIN TIME: 12.3 SEC (ref 9–12.5)
RBC # BLD AUTO: 3.08 M/UL (ref 4–5.4)
SODIUM SERPL-SCNC: 127 MMOL/L (ref 136–145)
TRIGL FLD-MCNC: 227 MG/DL
WBC # BLD AUTO: 3.52 K/UL (ref 3.9–12.7)

## 2023-02-21 PROCEDURE — 63600175 PHARM REV CODE 636 W HCPCS: Performed by: NURSE PRACTITIONER

## 2023-02-21 PROCEDURE — P9047 ALBUMIN (HUMAN), 25%, 50ML: HCPCS | Mod: JG | Performed by: STUDENT IN AN ORGANIZED HEALTH CARE EDUCATION/TRAINING PROGRAM

## 2023-02-21 PROCEDURE — 99233 SBSQ HOSP IP/OBS HIGH 50: CPT | Mod: ,,, | Performed by: INTERNAL MEDICINE

## 2023-02-21 PROCEDURE — 84100 ASSAY OF PHOSPHORUS: CPT | Performed by: STUDENT IN AN ORGANIZED HEALTH CARE EDUCATION/TRAINING PROGRAM

## 2023-02-21 PROCEDURE — 99232 PR SUBSEQUENT HOSPITAL CARE,LEVL II: ICD-10-PCS | Mod: ,,, | Performed by: STUDENT IN AN ORGANIZED HEALTH CARE EDUCATION/TRAINING PROGRAM

## 2023-02-21 PROCEDURE — 99232 SBSQ HOSP IP/OBS MODERATE 35: CPT | Mod: ,,, | Performed by: STUDENT IN AN ORGANIZED HEALTH CARE EDUCATION/TRAINING PROGRAM

## 2023-02-21 PROCEDURE — 27000207 HC ISOLATION

## 2023-02-21 PROCEDURE — 25000003 PHARM REV CODE 250: Performed by: STUDENT IN AN ORGANIZED HEALTH CARE EDUCATION/TRAINING PROGRAM

## 2023-02-21 PROCEDURE — 63600175 PHARM REV CODE 636 W HCPCS: Mod: JG | Performed by: STUDENT IN AN ORGANIZED HEALTH CARE EDUCATION/TRAINING PROGRAM

## 2023-02-21 PROCEDURE — 25000003 PHARM REV CODE 250: Performed by: INTERNAL MEDICINE

## 2023-02-21 PROCEDURE — 85610 PROTHROMBIN TIME: CPT | Performed by: STUDENT IN AN ORGANIZED HEALTH CARE EDUCATION/TRAINING PROGRAM

## 2023-02-21 PROCEDURE — 99233 PR SUBSEQUENT HOSPITAL CARE,LEVL III: ICD-10-PCS | Mod: ,,, | Performed by: INTERNAL MEDICINE

## 2023-02-21 PROCEDURE — 25000003 PHARM REV CODE 250

## 2023-02-21 PROCEDURE — 85025 COMPLETE CBC W/AUTO DIFF WBC: CPT

## 2023-02-21 PROCEDURE — 80053 COMPREHEN METABOLIC PANEL: CPT | Performed by: STUDENT IN AN ORGANIZED HEALTH CARE EDUCATION/TRAINING PROGRAM

## 2023-02-21 PROCEDURE — 25000003 PHARM REV CODE 250: Performed by: HOSPITALIST

## 2023-02-21 PROCEDURE — A4216 STERILE WATER/SALINE, 10 ML: HCPCS | Performed by: PSYCHIATRY & NEUROLOGY

## 2023-02-21 PROCEDURE — 25000003 PHARM REV CODE 250: Performed by: PSYCHIATRY & NEUROLOGY

## 2023-02-21 PROCEDURE — 83735 ASSAY OF MAGNESIUM: CPT | Performed by: STUDENT IN AN ORGANIZED HEALTH CARE EDUCATION/TRAINING PROGRAM

## 2023-02-21 PROCEDURE — 63600175 PHARM REV CODE 636 W HCPCS

## 2023-02-21 PROCEDURE — 20600001 HC STEP DOWN PRIVATE ROOM

## 2023-02-21 RX ORDER — BUMETANIDE 1 MG/1
1 TABLET ORAL DAILY
Status: DISCONTINUED | OUTPATIENT
Start: 2023-02-21 | End: 2023-02-22

## 2023-02-21 RX ORDER — BUMETANIDE 1 MG/1
2 TABLET ORAL DAILY
Status: DISCONTINUED | OUTPATIENT
Start: 2023-02-21 | End: 2023-02-21

## 2023-02-21 RX ORDER — SPIRONOLACTONE 100 MG/1
100 TABLET, FILM COATED ORAL DAILY
Status: DISCONTINUED | OUTPATIENT
Start: 2023-02-21 | End: 2023-02-21

## 2023-02-21 RX ORDER — ALBUMIN HUMAN 250 G/1000ML
100 SOLUTION INTRAVENOUS ONCE
Status: COMPLETED | OUTPATIENT
Start: 2023-02-21 | End: 2023-02-21

## 2023-02-21 RX ADMIN — BUPROPION HYDROCHLORIDE 150 MG: 150 TABLET, FILM COATED, EXTENDED RELEASE ORAL at 10:02

## 2023-02-21 RX ADMIN — MEROPENEM 2 G: 1 INJECTION INTRAVENOUS at 08:02

## 2023-02-21 RX ADMIN — OXYCODONE 5 MG: 5 TABLET ORAL at 04:02

## 2023-02-21 RX ADMIN — Medication 6 MG: at 08:02

## 2023-02-21 RX ADMIN — MEROPENEM 2 G: 1 INJECTION INTRAVENOUS at 04:02

## 2023-02-21 RX ADMIN — Medication 10 ML: at 12:02

## 2023-02-21 RX ADMIN — Medication 10 ML: at 06:02

## 2023-02-21 RX ADMIN — METHOCARBAMOL 1000 MG: 500 TABLET ORAL at 04:02

## 2023-02-21 RX ADMIN — ALBUMIN (HUMAN) 100 G: 12.5 SOLUTION INTRAVENOUS at 02:02

## 2023-02-21 RX ADMIN — METHOCARBAMOL 1000 MG: 500 TABLET ORAL at 12:02

## 2023-02-21 RX ADMIN — DULOXETINE 30 MG: 30 CAPSULE, DELAYED RELEASE ORAL at 10:02

## 2023-02-21 RX ADMIN — GABAPENTIN 900 MG: 300 CAPSULE ORAL at 10:02

## 2023-02-21 RX ADMIN — SENNOSIDES AND DOCUSATE SODIUM 1 TABLET: 50; 8.6 TABLET ORAL at 10:02

## 2023-02-21 RX ADMIN — LACTULOSE 20 G: 20 SOLUTION ORAL at 08:02

## 2023-02-21 RX ADMIN — ACETAMINOPHEN 1000 MG: 500 TABLET ORAL at 08:02

## 2023-02-21 RX ADMIN — ALBUMIN (HUMAN) 100 G: 12.5 SOLUTION INTRAVENOUS at 08:02

## 2023-02-21 RX ADMIN — METHOCARBAMOL 1000 MG: 500 TABLET ORAL at 05:02

## 2023-02-21 RX ADMIN — GABAPENTIN 900 MG: 300 CAPSULE ORAL at 08:02

## 2023-02-21 RX ADMIN — HEPARIN SODIUM 5000 UNITS: 5000 INJECTION INTRAVENOUS; SUBCUTANEOUS at 04:02

## 2023-02-21 RX ADMIN — POLYETHYLENE GLYCOL 3350 17 G: 17 POWDER, FOR SOLUTION ORAL at 10:02

## 2023-02-21 RX ADMIN — HEPARIN SODIUM 5000 UNITS: 5000 INJECTION INTRAVENOUS; SUBCUTANEOUS at 08:02

## 2023-02-21 RX ADMIN — HEPARIN SODIUM 5000 UNITS: 5000 INJECTION INTRAVENOUS; SUBCUTANEOUS at 02:02

## 2023-02-21 RX ADMIN — MEROPENEM 2 G: 1 INJECTION INTRAVENOUS at 12:02

## 2023-02-21 RX ADMIN — PANTOPRAZOLE SODIUM 40 MG: 40 TABLET, DELAYED RELEASE ORAL at 10:02

## 2023-02-21 RX ADMIN — GABAPENTIN 900 MG: 300 CAPSULE ORAL at 02:02

## 2023-02-21 RX ADMIN — DULOXETINE 30 MG: 30 CAPSULE, DELAYED RELEASE ORAL at 08:02

## 2023-02-21 RX ADMIN — OXYCODONE 5 MG: 5 TABLET ORAL at 10:02

## 2023-02-21 NOTE — ASSESSMENT & PLAN NOTE
42 year old woman s/p multiple spinal surgeries presented to OSH with possible infection of shoulder. Found to have UTI and E. Coli bacteremia and progressively worse BLE weakness. Transferred to [place] for neurosurgical evaluation. Underwent Laminectomy T8-T10; Posterior spinal fusion T8-T12; hardware removal and washout on 1/24. Admitted to [facility] postop. On 2/2 underwent T4-pelvis fusion and T9-T10 corpectomies. To complete 8 week course of meropenem per ID for ESBL E coli from bone culture, end date 3/29.     - neuro checks q4h  - meropenem for ESBL bacteremia per ID, eot 3/29/23  - Plastic surgery following, wound vac discontinued  - CMP, Mag, Phos, CBC daily  - MAP goals >65, SBP < 180  - PRN labetalol, hydralazine  - MM pain regimen: PRN Dilaudid, Tylenol, gabapentin, robaxin, PRN oxycodone  - Aggressive bowel regimen  - PT/OT

## 2023-02-21 NOTE — ASSESSMENT & PLAN NOTE
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  IR consulted  for lymphangiogram and  eval for  lymphatic repair  2/15  IR eval -repeat paracentesis to trend volume output and  conservative management    spironolactone  increased to 100 mg Qday. Continue IV Lasix. Dietary consulted for  high-protein and low-fat diet with medium-chain triglycerides  diet.     Will continue to monitor for further paracentesis need.  IR deferring lymphangiogram at this time.

## 2023-02-21 NOTE — ASSESSMENT & PLAN NOTE
MELD-Na score: 9 at 2/21/2023  4:36 AM  MELD score: 9 at 2/21/2023  4:36 AM  Calculated from:  Serum Creatinine: 0.5 mg/dL (Using min of 1 mg/dL) at 2/21/2023  4:36 AM  Serum Sodium: 127 mmol/L at 2/21/2023  4:36 AM  Total Bilirubin: 1.1 mg/dL at 2/21/2023  4:36 AM  INR(ratio): 1.2 at 2/21/2023  4:36 AM  Age: 42 years      Patient has reportedly refused transplant evaluation in the past.   - Daily CMP and trend LFTs  - Continue Lactulose 20 g TID  - Lasix 40 mg PO BID  - Will need Hepatology follow up outpatient  2/10   ammonia at 57 . sono abdomen  . tylenol changed to 1000mg q12h with cirrhosis . s/p IR paracentesis today   2/11no evidence of SBP on paracentesis. 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 AFB and Triglyerides on ascitic fluid.    2/13 s/p paracentesis. Removed 4,700 ml. DVT sonogram LE negative.2/13 s/p paracentesis. Removed 4,700 ml. DVT sonogram LE negative. s/p psychiatry eval  for anxiety/depression. xanax discontinued. Decreased Bupropion to 150 mg PO Daily . Started Duloxetine 30 mg PO BID and  PRN Hydroxyzine 25 mg PO q8h for anxiety

## 2023-02-21 NOTE — ASSESSMENT & PLAN NOTE
42 y.o F w/ hx of IV drug use (meth), chronic HCV with cirrhosis, spinal fusion (04/2021), epidural abscess with removal of hardware (02/2022), spinal fusion with hardware (T10 - Pelvis / 03/2022), and neurogenic bladder here with complicated ESBL E.coli epidural abscess s/p washout,corpectomy, fixation being treated w/ meropenem.     Nephrology consulted for hyponatremia. Patient reports uncomfortable abdominal distension and leg swelling. Reports drinking 3-4 cups of her 30oz water tumbler daily. Urine Na <10, urine osm 513. Serum osm 283. Serum Na stable. Normal mentation.    Hyponatremia likely 2/2 to low effective circulating volume 2/2 cirrhosis. Possible poor solute intake relative to free water intake. Unlikely SIADH given low urine Na.   Repeat urine studies 2/14 suggestive of appropriate ADH in setting of cirrhosis    Will stop Bumax and start 25% Albumin 100 ml Q8 hours.     Will consider Norepinephrine if the BP is sill low.     Continue fluid restriction, though needs to have at least 500 cc intake

## 2023-02-21 NOTE — PROGRESS NOTES
[facility] - Telemetry [facility]  Progress Note    Patient Name: Rachel Zazueta  MRN: [id number]  Patient Class: IP- Inpatient   Admission Date: 1/19/2023  Length of Stay: 33 days  Attending Physician: Stephanie Nelson MD  Primary Care Provider: Dannielle Merino DO        Subjective:     Principal Problem:Weakness of both lower extremities        HPI:  Ms. Zazueta is a 42 y.o F w/ hx ofIV drug use (methamphetamine), chronic HCV with cirrhosis, spinal fusion (04/2021), epidural abscess with removal of hardware (02/2022), spinal fusion with hardware (T10 - Pelvis / 03/2022), obesity (BMI 39.3) and neurogenic bladder who initially presented to [facility] for evaluation of back pain and left leg weakness.  She reports initially noting the left leg weakness when she was about to go to the bathroom and was about to fall but was assisted by her boyfriend.  She was brought to the ED via EMS.  Urinalysis with UTI concerns and blood cultures at OSH grew ESBL E coli so she was treated with meropenem.  During hospitalization, she reports the weakness that started out in her left leg initially then spread upwards to her left thigh, left hip, then crossed over to the right hip and spread to her right leg.  Denies recent IV drug use. She reports her last incidence of drug use was more than 3 years ago and also denies tobacco, and alcohol abuse.  Reports cannabis use.    She also reports more than 10 years ago she had an episode of a headache that lasted about 4 days and was associated with residual defects of a strabismus in the left eye with associated visual disturbances.  She also reports over the past few years her friends have noticed that she sometimes has word-finding difficulty during conversations.     Reports shortness of breath when sitting up, fever, chills, back pain, lower extremity weakness(initally L>R, but now R>L), diarrhea.  Denies cough, nausea, vomiting, constipation, bladder or bowel  incontinence, dysuria, dark urine, new vision changes.    OSH course notable for concerning urinalysis and blood cultures positive for ESBL E coli treated with meropenem.  She was also admit to the ICU where she was treated with Precedex for significant body aches, irritability, and muscle spasms.  Concerns of a murmur on physical exam so she underwent TTE which did not show any vegetations.  Worsening lower extremity weakness workup with MRI head nonspecific, MRI cervical spine with multilevel spondylosis, X-ray spine with multilevel thoracolumbar fusion and diskectomy, focal kyphosis at T9-10 increased compared to prior.  She was started on prophylaxis amoxicillin due to her history of infected hardware.  MRI spine unable to be completed due to patient's body habitus so she was transferred to [facility] for further imaging and Neurosurgery, Neurology evaluation.      Overview/Hospital Course:  Pt admitted as a transfer from [facility] for worsening LE weakness, concern for spinal infection, and need for MRI which could not be done at OSH. Imaging was completed here. Worsening proximal junctional kyphosis noted at T9-10 noted with concern for discitis at T8-9, T9-10. NSGY evaluated.     Found to have T8-T10 kyphosis on MRI. 1/24 underwent laminectomy T8-T10; posterior fusion T8-T12; and washout. 2/2 underwent T4-pelvis fusion and T9-T10 corpectomy.  ID consulted for thoracic discitis infection.  Patient to complete 8 weeks of therapy with meropenem. Patient underwent flap closure with Plastic surgery.  Step-down Hospital Medicine on 02/08.    2/9   On meropenem for MDR-ESCHERICHIA COLI ESBL  sodium trended down to 129.  Neurosurgery following post OR and aiding in pain management. PCA discontinued 2/8 . On oxycodone 10mg PO q4h . requiring IV dilaudid q4 as needed,. drains to gravity output 990ml/24h . Plastic surgery following flap, managing wounds.  Wound VAC discontinued on Wednesday.  accepted to [facility] pain  management consulted for back ache- switched to multimodal therapy. sodium trended down to 127.   2/10 sodium stable at 127 . continues with increased drain output 985ml/24h . negative balance of  2.8 L lasix on hold.  nephrology consulted. abdominal X ray -  Nonobstructive bowel gas pattern.  No free air.  As before, mild gaseous distension of stomach. ammonia at 57 . sono abdomen  . tylenol changed to 1000mg q12h with cirrhosis . sono abdomen -Cirrhotic liver morphology.  Sequela of portal hypertension including moderate ascites, recanalized umbilical vein.  No focal hepatic lesions. Cholecystectomy. . Nephrology recs  fluid restriction to 1 L,  lasix 60 mg IV daily,  and trend sodium q12h.  diazepam discontinued . s/p IR paracentesis today   2/11no evidence of SBP on paracentesis. 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 AFB and Triglyerides on ascitic fluid.  noncompliant with  fluid restriction to 1 L despite counseling. sodium trended down to 126 .received 2 doses of IV lasix 60mg. drain output 250ml. Increased Lasix IV 80 BID  due to UOP 500ml/24h   2/12 UOP of 1250ml/24h. K and P replaced. sodium trended up to 129.  hepatology eval -If ascitic fluid triglyceride level is elevated, needs IR  eval for lymphangiogram and surgery eval for  lymphatic repair. xanax 0.25mg BID PRN for anxiety   2/13 s/p paracentesis. Removed 4,700 ml. DVT sonogram LE negative. s/p psychiatry eval  for anxiety/depression. xanax discontinued. Decreased Bupropion to 150 mg PO Daily . Started Duloxetine 30 mg PO BID and  PRN Hydroxyzine 25 mg PO q8h for anxiety  2/14 Hb 6.9 Transfusion with 1 unit of PRBC . FOBT. sodium trended up to 130 . Diuresing well with lasix IV . DVT sonogram - Nonspecific fluid collection adjacent to the left iliac vein.  Recommend further evaluation with contrast enhanced CT scan . has Iodine contrast allergy- . 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  Diuresing well on IV lasix BID. negative balance of  13.6 L . IR eval -repeat paracentesis to trend volume output and  conservative management   with a low fat diet, octreotide, diuretics. Neurosurgery for input regarding chylous ascitis as possible neurosurgical complication or not. CT abdomen done overnight as well - Postop change in the spine.  There is lucency about the bi iliac screws concerning for loosening.  Bony destruction T10 with interbody strut. started on salt tablets 1g BID x 1day and lasix drip 10mg/h x 6h .  spironolactone  increased to 100 mg Qday. Continue IV Lasix. Dietary consulted for  high-protein and low-fat diet with medium-chain triglycerides  diet.  paracentesis 6.6L removed   2/16 Continuing gentle IV lasix with assistance from nephrology, remains fluid overloaded  2/17: continue Lasix infusion, responding well  2/18-2/19:  Please see progress notes from those dates.    2/20: D/w nephrology to transition from IV Lasix to IV bumex for further diuresis, though Na likely optimized as much as possible. D/w NSGY re xray prior to d/c ( sitting upright in the stretcher is fine.) D/w hepatology: IR wanting to defer lymphangiogram at this time.   2/21:  Discussed with plastic surgery; goal for each drain 30 cc or less daily      Interval History: No events overnight. UOP documented 400mL for last 24hrs.  Soft BP.  Spironolactone discontinued for now.  Per discussion with Nephrology, IV Lasix transitioned to IV Bumex.  Patient reports no acute issues today.  Abdominal distension stable to slightly enlarged.    Review of Systems   Constitutional:  Positive for appetite change (so-so). Negative for chills and fever.   HENT:  Negative for congestion, sore throat and trouble swallowing.    Eyes:  Negative for visual disturbance.   Respiratory:   Negative for cough, chest tightness and shortness of breath.    Cardiovascular:  Positive for leg swelling. Negative for chest pain.   Gastrointestinal:  Positive for abdominal distention. Negative for abdominal pain, constipation, diarrhea and nausea.   Genitourinary:  Negative for dysuria.   Musculoskeletal:  Positive for arthralgias.   Skin:  Negative for rash.   Neurological:  Negative for dizziness, weakness and light-headedness.   Psychiatric/Behavioral:  Negative for decreased concentration.    Objective:     Vital Signs (Most Recent):  Temp: 97.9 °F (36.6 °C) (02/21/23 1031)  Pulse: 102 (02/21/23 1135)  Resp: 18 (02/21/23 1031)  BP: (!) 89/50 (02/21/23 1031)  SpO2: 98 % (02/21/23 1031)   Vital Signs (24h Range):  Temp:  [97.8 °F (36.6 °C)-98.2 °F (36.8 °C)] 97.9 °F (36.6 °C)  Pulse:  [] 102  Resp:  [15-20] 18  SpO2:  [95 %-100 %] 98 %  BP: ()/(42-55) 89/50     Weight: (!) 142 kg (313 lb)  Body mass index is 49.02 kg/m².    Intake/Output Summary (Last 24 hours) at 2/21/2023 1336  Last data filed at 2/21/2023 0639  Gross per 24 hour   Intake 450 ml   Output 1485 ml   Net -1035 ml        Physical Exam  Vitals and nursing note reviewed.   Constitutional:       General: She is not in acute distress.     Appearance: She is well-developed. She is obese. She is ill-appearing. She is not toxic-appearing or diaphoretic.   HENT:      Head: Normocephalic and atraumatic.      Right Ear: External ear normal.      Left Ear: External ear normal.      Nose: Nose normal.      Mouth/Throat:      Mouth: Mucous membranes are moist.      Pharynx: Oropharynx is clear.   Eyes:      General: No scleral icterus.        Right eye: No discharge.   Cardiovascular:      Rate and Rhythm: Normal rate and regular rhythm.      Pulses: Normal pulses.      Heart sounds: Normal heart sounds.   Pulmonary:      Effort: Pulmonary effort is normal.      Breath sounds: Normal breath sounds.   Abdominal:      General: There is  distension.      Palpations: Abdomen is soft.      Tenderness: There is no abdominal tenderness. There is no guarding or rebound.      Comments: 4 drains in place with serosanguinous fluid   Musculoskeletal:         General: No tenderness.      Cervical back: Neck supple.      Right lower leg: Edema present.      Left lower leg: Edema present.   Skin:     General: Skin is warm and dry.   Neurological:      Mental Status: She is alert and oriented to person, place, and time.   Psychiatric:         Behavior: Behavior normal.       Significant Labs: All pertinent labs within the past 24 hours have been reviewed.      Recent Results (from the past 24 hour(s))   Comprehensive Metabolic Panel    Collection Time: 02/21/23  4:36 AM   Result Value Ref Range    Sodium 127 (L) 136 - 145 mmol/L    Potassium 3.9 3.5 - 5.1 mmol/L    Chloride 93 (L) 95 - 110 mmol/L    CO2 30 (H) 23 - 29 mmol/L    Glucose 87 70 - 110 mg/dL    BUN 22 (H) 6 - 20 mg/dL    Creatinine 0.5 0.5 - 1.4 mg/dL    Calcium 7.9 (L) 8.7 - 10.5 mg/dL    Total Protein 5.2 (L) 6.0 - 8.4 g/dL    Albumin 1.9 (L) 3.5 - 5.2 g/dL    Total Bilirubin 1.1 (H) 0.1 - 1.0 mg/dL    Alkaline Phosphatase 171 (H) 55 - 135 U/L    AST 53 (H) 10 - 40 U/L    ALT 33 10 - 44 U/L    Anion Gap 4 (L) 8 - 16 mmol/L    eGFR >60.0 >60 mL/min/1.73 m^2   Magnesium    Collection Time: 02/21/23  4:36 AM   Result Value Ref Range    Magnesium 2.0 1.6 - 2.6 mg/dL   Phosphorus    Collection Time: 02/21/23  4:36 AM   Result Value Ref Range    Phosphorus 3.2 2.7 - 4.5 mg/dL   CBC Auto Differential    Collection Time: 02/21/23  4:36 AM   Result Value Ref Range    WBC 3.52 (L) 3.90 - 12.70 K/uL    RBC 3.08 (L) 4.00 - 5.40 M/uL    Hemoglobin 8.6 (L) 12.0 - 16.0 g/dL    Hematocrit 28.0 (L) 37.0 - 48.5 %    MCV 91 82 - 98 fL    MCH 27.9 27.0 - 31.0 pg    MCHC 30.7 (L) 32.0 - 36.0 g/dL    RDW 17.5 (H) 11.5 - 14.5 %    Platelets 94 (L) 150 - 450 K/uL    MPV 9.3 9.2 - 12.9 fL    Immature Granulocytes 0.3 0.0 -  0.5 %    Gran # (ANC) 2.2 1.8 - 7.7 K/uL    Immature Grans (Abs) 0.01 0.00 - 0.04 K/uL    Lymph # 0.6 (L) 1.0 - 4.8 K/uL    Mono # 0.6 0.3 - 1.0 K/uL    Eos # 0.2 0.0 - 0.5 K/uL    Baso # 0.04 0.00 - 0.20 K/uL    nRBC 0 0 /100 WBC    Gran % 61.7 38.0 - 73.0 %    Lymph % 15.6 (L) 18.0 - 48.0 %    Mono % 16.5 (H) 4.0 - 15.0 %    Eosinophil % 4.8 0.0 - 8.0 %    Basophil % 1.1 0.0 - 1.9 %    Differential Method Automated    Protime-INR    Collection Time: 02/21/23  4:36 AM   Result Value Ref Range    Prothrombin Time 12.3 9.0 - 12.5 sec    INR 1.2 0.8 - 1.2       Significant Imaging: I have reviewed all pertinent imaging results/findings within the past 24 hours.            Assessment/Plan:      * Weakness of both lower extremities  42 year old woman s/p multiple spinal surgeries presented to OSH with possible infection of shoulder. Found to have UTI and E. Coli bacteremia and progressively worse BLE weakness. Transferred to [facility] for neurosurgical evaluation. Underwent Laminectomy T8-T10; Posterior spinal fusion T8-T12; hardware removal and washout on 1/24. Admitted to [facility] postop. On 2/2 underwent T4-pelvis fusion and T9-T10 corpectomies. To complete 8 week course of meropenem per ID for ESBL E coli from bone culture, end date 3/29.     - neuro checks q4h  - meropenem for ESBL bacteremia per ID, eot 3/29/23  - Plastic surgery following, wound vac discontinued  - CMP, Mag, Phos, CBC daily  - MAP goals >65, SBP < 180  - PRN labetalol, hydralazine  - MM pain regimen: PRN Dilaudid, Tylenol, gabapentin, robaxin, PRN oxycodone  - Aggressive bowel regimen  - PT/OT     Hypotension  -multifactorial in the setting of narcotics and diuresis  - given bolus 500 x1 2/19, lactic acid negative  -continues to remain soft  - Will continue to monitor blood pressure trend closely and adjust medication regimen as clinically indicated and tolerated      E coli infection  See weakness of both lower extremities   Meropenem for ESBL bacteremia  per ID until 3/29      Thoracic discitis  See Weakness of both lower extremities    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  IR consulted  for lymphangiogram and  eval for  lymphatic repair  2/15  IR eval -repeat paracentesis to trend volume output and  conservative management    spironolactone  increased to 100 mg Qday. Continue IV Lasix. Dietary consulted for  high-protein and low-fat diet with medium-chain triglycerides  diet.     Will continue to monitor for further paracentesis need.  IR deferring lymphangiogram at this time.    Ascites due to alcoholic cirrhosis  2/10  sono abdomen -Cirrhotic liver morphology.  Sequela of portal hypertension including moderate ascites, recanalized umbilical vein.  No focal hepatic lesions. Cholecystectomy. s/p IR paracentesis as above  2/11no evidence of SBP on paracentesis. 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 AFB and Triglyerides on ascitic fluid.    2/12  hepatology eval -If ascitic fluid triglyceride level is elevated, needs IR  eval for lymphangiogram and surgery eval for  lymphatic repair    Discussed with hepatology.  IR deferring lymph angiogram at this time.    Chronic hepatitis C with cirrhosis  See above    Cirrhosis of liver with ascites  MELD-Na score: 9 at 2/21/2023  4:36 AM  MELD score: 9 at 2/21/2023  4:36 AM  Calculated from:  Serum Creatinine: 0.5 mg/dL (Using min of 1 mg/dL) at 2/21/2023  4:36 AM  Serum Sodium: 127 mmol/L at 2/21/2023  4:36 AM  Total Bilirubin: 1.1 mg/dL at 2/21/2023  4:36 AM  INR(ratio): 1.2 at 2/21/2023  4:36 AM  Age: 42 years      Patient has reportedly refused transplant evaluation in the past.   - Daily CMP and trend LFTs  - Continue Lactulose 20 g TID  - Lasix 40 mg PO BID  - Will need Hepatology follow up outpatient  2/10   ammonia at 57 . sono abdomen  . tylenol changed to 1000mg q12h with cirrhosis . s/p IR paracentesis  today   2/11no evidence of SBP on paracentesis. 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 AFB and Triglyerides on ascitic fluid.    2/13 s/p paracentesis. Removed 4,700 ml. DVT sonogram LE negative.2/13 s/p paracentesis. Removed 4,700 ml. DVT sonogram LE negative. s/p psychiatry eval  for anxiety/depression. xanax discontinued. Decreased Bupropion to 150 mg PO Daily . Started Duloxetine 30 mg PO BID and  PRN Hydroxyzine 25 mg PO q8h for anxiety    Hyponatremia  - Patient with sodium   Recent Labs   Lab 02/19/23  1255 02/20/23  0351 02/21/23  0436   * 129* 127*   . sodium trending down    2/9 monitor on lasix BID.sodium trended down to 127.   2/10 sodium stable at 127 . continues with increased drain output 985ml/24h . negative balance of  2.8 L lasix on hold.  nephrology consulted.  Nephrology recs  fluid restriction to 1 L,  lasix 60 mg IV daily,  and trend sodium q12h.  diazepam discontinued   2/11  noncompliant with  fluid restriction to 1 L despite counseling. sodium trended down to 126 .received 2 doses of IV lasix 60mg. Increased Lasix IV 80 BID  due to UOP 500ml/24h   2/12 UOP of 1250ml/24h.sodium trended up to 129   2/14 sodium trended up to 130 . Diuresing well with lasix IV .   2/15 started on salt tablets 1g BID and lasix drip 10mg/h x 6h  2/16 continue Lasix 40 IVP x 1 with Lasix drip 10mg/hr x 6   2/17 sodium 134  2/18 sodium 131. On lasix drip . Increased from 5 to 10  2/19 sodium 130, lasix stopped   2/20 Bumex initiated per Nephrology recs  Will continue to monitor and adjust diuretic needs given hypotension    Acute blood loss anemia  Hb 6.4 on 2/6, received 1U pRBCs. Hb 2/7 WNL.  - monitor Hb  - transfuse to maintain Hb >7  2/9    Patient's with Normocytic anemia.. Hemoglobin stable. Etiology likely due to chronic disease .  Current CBC reviewed-    Recent Labs   Lab 02/19/23  1255 02/20/23  0353 02/21/23  0436   HGB 8.0* 8.3* 8.6*     Monitor CBC and transfuse if H/H drops below 7/21.   2/14 Hb 6.9 Transfusion with 1 unit of PRBC . FOBT negative  2/17: hgb 8.1, stable  2/18: hbg 8.2 stable   2/19 stable , hbg 8    Abdominal pain  2/10  abdominal X ray -  Nonobstructive bowel gas pattern.  No free air.  As before, mild gaseous distension of stomach. ammonia at 57 . sono abdomen with ascitis . simethicone PRN  2/11no evidence of SBP on paracentesis. 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 AFB and Triglyerides on ascitic fluid.      Kyphosis of thoracolumbar region  See weakness    Anxiety and depression  Evaluated by Psychiatry at OSH prior to transfer. Recommended increasing bupropion.  - Continue bupropion 300 mg QD  - Gabapentin 600 mg TID for anxiety and neuropathy  - PRN diazepam 5 mg q6h discontinued  2/13   s/p psychiatry eval  for anxiety/depression. xanax discontinued. Decreased Bupropion to 150 mg PO Daily . Started Duloxetine 30 mg PO BID and  PRN Hydroxyzine 25 mg PO q8h for anxiety    Severe obesity (BMI >= 40)  Body mass index is 48.58 kg/m². Morbid obesity complicates all aspects of disease management from diagnostic modalities to treatment. Weight loss encouraged    Substance use disorder  Denies IV drug use (meth) for past 3 years. Denies alcohol and tobacco abuse.     Pancytopenia  - acute on chronic  - follows with hematology outpatient  - CBC daily      VTE Risk Mitigation (From admission, onward)         Ordered     heparin (porcine) injection 5,000 Units  Every 8 hours         02/04/23 0848     IP VTE HIGH RISK PATIENT  Once         01/19/23 2153     Place sequential compression device  Until discontinued         01/19/23 2153     Reason for No Pharmacological VTE Prophylaxis  Once        Question:  Reasons:  Answer:  Physician Provided (leave comment)  Comment:  Potential NSGY procedure    01/19/23 2153                Discharge Planning   SHIRA: 2/24/2023     Code  Status: Partial Code   Is the patient medically ready for discharge?: No    Reason for patient still in hospital (select all that apply): Patient trending condition, Consult recommendations and Pending disposition  Discharge Plan A: Long-term acute care facility (LTAC)   Discharge Delays: None known at this time              Stephanie Nelson MD  Department of Hospital Medicine   [facility] - Telemetry Stepdown

## 2023-02-21 NOTE — ASSESSMENT & PLAN NOTE
2/10  sono abdomen -Cirrhotic liver morphology.  Sequela of portal hypertension including moderate ascites, recanalized umbilical vein.  No focal hepatic lesions. Cholecystectomy. s/p IR paracentesis as above  2/11no evidence of SBP on paracentesis. 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 AFB and Triglyerides on ascitic fluid.    2/12  hepatology eval -If ascitic fluid triglyceride level is elevated, needs IR  eval for lymphangiogram and surgery eval for  lymphatic repair    Discussed with hepatology.  IR deferring lymph angiogram at this time.

## 2023-02-21 NOTE — PROGRESS NOTES
"[facility] - Telemetry Stepdown  Nephrology  Progress Note    Patient Name: Rachel Zazueta  MRN: [id number]  Admission Date: 1/19/2023  Hospital Length of Stay: 33 days  Attending Provider: Stephanie Nelson MD   Primary Care Physician: Dannielle Merino DO  Principal Problem:Weakness of both lower extremities    Subjective:     HPI: Per HM note: "42 y.o F w/ hx ofIV drug use (methamphetamine), chronic HCV with cirrhosis, spinal fusion (04/2021), epidural abscess with removal of hardware (02/2022), spinal fusion with hardware (T10 - Pelvis / 03/2022), obesity (BMI 39.3) and neurogenic bladder and recent shoulder surgery who initially presented to [facility] for evaluation of back pain and left leg weakness. She reports initially noting the left leg weakness when she was about to go to the bathroom and was about to fall but was assisted by her boyfriend.  She was brought to the ED via EMS. Urinalysis with UTI concerns and blood cultures at OSH grew ESBL E coli, and shoulder effusion concern for infection so she was treated with meropenem.  During hospitalization, she reports the weakness that started out in her left leg initially then spread upwards to her left thigh, left hip, then crossed over to the right hip and spread to her right leg.  Denies recent IV drug use. She reports her last incidence of drug use was more than 3 years ago and also denies tobacco, and alcohol abuse.  Reports cannabis use.     OSH course notable for concerning urinalysis and blood cultures positive for ESBL E coli treated with meropenem.  She was also admit to the ICU where she was treated with Precedex for significant body aches, irritability, and muscle spasms.  Concerns of a murmur on physical exam so she underwent TTE which did not show any vegetations.  Worsening lower extremity weakness workup with MRI head nonspecific, MRI cervical spine with multilevel spondylosis, X-ray spine with multilevel thoracolumbar fusion and " "diskectomy, focal kyphosis at T9-10 increased compared to prior.  She was started on prophylaxis amoxicillin due to her history of infected hardware.  MRI spine unable to be completed due to patient's body habitus so she was transferred to [facility] for further imaging and Neurosurgery, Neurology evaluation." Underwent laminectomy, posterior fusion and washout on 1/24. ID following for discitis, has her on meropenem. Stepped down to  on 2/8.     Nephrology consulted for hyponatremia. Patient reports uncomfortable abdominal distension and leg swelling. Reports drinking 3-4 cups of her 30oz water tumbler daily. Urine Na <10, urine osm 513. Serum Na trend 136--> 132-->131-->129-->127-->135-->134-->127. Normal mentation. Cr/BUN wnl. Albumin 1.8, protein 4.5, bili 1.4.          Interval History: The patient is sleeping in the bed all the time. She said she is feeling stable. Her labs showed that the Na had decreased to 127micmol/l. She is restricting the fluid intake. Her BP is on the low side with systolic below  100mmHg. We would recommend to stop the Bumitanide and avoid any lasix bolus. At this stage Albumin infusion 100 ml of  25 % Q 8 hours. If the BP did not improve we will have to consider Norepinephrine      Review of patient's allergies indicates:   Allergen Reactions    Bee venom protein (honey bee) Anaphylaxis     Patient reports she is allergic to bee stings.    Naproxen Anaphylaxis     Throat closing    12-23- Patient reports taking Ibuprofen 200 mg at home without problems. Verified X3. [place]    Wasp sting [allergen ext-venom-honey bee] Anaphylaxis    Adhesive Blisters    Shellfish containing products     Iodine and iodide containing products Hives and Itching     Allergic to iodine in seafood only    Nuts [tree nut] Rash     Current Facility-Administered Medications   Medication Frequency    acetaminophen tablet 1,000 mg Q12H    albumin human 25% bottle 100 g Once    bisacodyL suppository 10 mg Every " other day    bumetanide tablet 1 mg Daily    buPROPion TB24 tablet 150 mg Daily    dextrose 10% bolus 125 mL 125 mL PRN    dextrose 10% bolus 250 mL 250 mL PRN    diphenhydrAMINE capsule 25 mg On Call Procedure    DULoxetine DR capsule 30 mg BID    gabapentin capsule 900 mg TID    glucagon (human recombinant) injection 1 mg PRN    glucose chewable tablet 16 g PRN    glucose chewable tablet 24 g PRN    heparin (porcine) injection 5,000 Units Q8H    hydrALAZINE tablet 25 mg Q8H PRN    HYDROmorphone injection 0.5 mg Q4H PRN    hydrOXYzine HCL tablet 25 mg TID PRN    lactulose 20 gram/30 mL solution Soln 20 g TID    melatonin tablet 6 mg Nightly PRN    meropenem (MERREM) 2 g in sodium chloride 0.9% 100 mL IVPB Q8H    methocarbamoL tablet 1,000 mg Q6H    naloxone 0.4 mg/mL injection 0.02 mg PRN    ondansetron injection 4 mg Q6H PRN    oxyCODONE immediate release tablet 5 mg Q3H PRN    And    oxyCODONE immediate release tablet Tab 10 mg Q3H PRN    pantoprazole EC tablet 40 mg Daily    polyethylene glycol packet 17 g Daily PRN    predniSONE tablet 50 mg On Call Procedure    prochlorperazine injection Soln 2.5 mg Q6H PRN    senna-docusate 8.6-50 mg per tablet 1 tablet BID PRN    simethicone chewable tablet 80 mg TID PRN    sodium chloride 0.9% flush 10 mL Q6H    And    sodium chloride 0.9% flush 10 mL PRN       Objective:     Vital Signs (Most Recent):  Temp: 97.9 °F (36.6 °C) (02/21/23 1452)  Pulse: 106 (02/21/23 1531)  Resp: 18 (02/21/23 1452)  BP: (!) 103/55 (02/21/23 1452)  SpO2: 100 % (02/21/23 1452)   Vital Signs (24h Range):  Temp:  [97.8 °F (36.6 °C)-98.2 °F (36.8 °C)] 97.9 °F (36.6 °C)  Pulse:  [] 106  Resp:  [15-20] 18  SpO2:  [96 %-100 %] 100 %  BP: ()/(42-55) 103/55     Weight: (!) 142 kg (313 lb) (02/20/23 0556)  Body mass index is 49.02 kg/m².  Body surface area is 2.59 meters squared.    I/O last 3 completed shifts:  In: 450 [P.O.:450]  Out: 2185 [Urine:1800;  Drains:385]    Vitals:    02/21/23 1031 02/21/23 1135 02/21/23 1452 02/21/23 1531   BP: (!) 89/50  (!) 103/55    BP Location: Right arm  Right arm    Patient Position: Lying  Lying    Pulse: 101 102 103 106   Resp: 18  18    Temp: 97.9 °F (36.6 °C)  97.9 °F (36.6 °C)    TempSrc: Oral  Oral    SpO2: 98%  100%    Weight:       Height:            Physical Exam  Constitutional:       Comments: Pale. Sleeping most of the time.    HENT:      Head: Normocephalic.   Cardiovascular:      Rate and Rhythm: Normal rate.      Pulses: Normal pulses.   Pulmonary:      Effort: Pulmonary effort is normal.   Musculoskeletal:      Cervical back: Neck supple.      Right lower leg: Edema present.      Left lower leg: Edema present.   Neurological:      Mental Status: She is alert.       Significant Labs:  CBC:   Recent Labs   Lab 02/21/23  0436   WBC 3.52*   RBC 3.08*   HGB 8.6*   HCT 28.0*   PLT 94*   MCV 91   MCH 27.9   MCHC 30.7*     CMP:   Recent Labs   Lab 02/21/23  0436   GLU 87   CALCIUM 7.9*   ALBUMIN 1.9*   PROT 5.2*   *   K 3.9   CO2 30*   CL 93*   BUN 22*   CREATININE 0.5   ALKPHOS 171*   ALT 33   AST 53*   BILITOT 1.1*     LFTs:   Recent Labs   Lab 02/21/23  0436   ALT 33   AST 53*   ALKPHOS 171*   BILITOT 1.1*   PROT 5.2*   ALBUMIN 1.9*           Assessment/Plan:     Renal/  Hyponatremia  42 y.o F w/ hx of IV drug use (meth), chronic HCV with cirrhosis, spinal fusion (04/2021), epidural abscess with removal of hardware (02/2022), spinal fusion with hardware (T10 - Pelvis / 03/2022), and neurogenic bladder here with complicated ESBL E.coli epidural abscess s/p washout,corpectomy, fixation being treated w/ meropenem.     Nephrology consulted for hyponatremia. Patient reports uncomfortable abdominal distension and leg swelling. Reports drinking 3-4 cups of her 30oz water tumbler daily. Urine Na <10, urine osm 513. Serum osm 283. Serum Na stable. Normal mentation.    Hyponatremia likely 2/2 to low effective circulating  volume 2/2 cirrhosis. Possible poor solute intake relative to free water intake. Unlikely SIADH given low urine Na.   Repeat urine studies 2/14 suggestive of appropriate ADH in setting of cirrhosis    Will stop Bumax and start 25% Albumin 100 ml Q8 hours.     Will consider Norepinephrine if the BP is sill low.     Continue fluid restriction, though needs to have at least 500 cc intake      GI  Chronic hepatitis C with cirrhosis  Will need to f/u with outpatient hepatology for antiviral treatment    Cirrhosis of liver with ascites  Would minimize risk of acute liver injury by restricting tylenol use, however will defer to primary        Thank you for your consult. Will keep following the patine ton daily bases,     If you have any question please do nto hesitate to call.         Chief Complaint   Patient presents with    lower extremity weakness        Past Medical History:   Diagnosis Date    Anemia     Anxiety     Depressed     Diskitis     Hep C w/o coma, chronic     IV drug abuse     Kidney stone     Liver cirrhosis        Past Surgical History:   Procedure Laterality Date    ARTHROTOMY OF SHOULDER Left 11/15/2022    Procedure: ARTHROTOMY, SHOULDER;  Surgeon: Bjorn Hayes MD;  Location: [facility];  Service: Orthopedics;  Laterality: Left;  Left acromioclavicular joint arthrotomy    BACK SURGERY      benine tumor removal      forehead, 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9    CHOLECYSTECTOMY      KIDNEY SURGERY      LUMBAR FUSION Bilateral 3/2/2022    Procedure: FUSION, SPINE, LUMBAR;  Surgeon: Guille Templeton MD;  Location: [address];  Service: Neurosurgery;  Laterality: Bilateral;  AIRO  T10--Pelvis    LUMBAR FUSION N/A 1/24/2023    Procedure: **AIRO** T8-T12 POSTERIOR FUSION, T8-T10 LAMINECTOMY, HARDWARE REMOVAL AND WASHOUT;  Surgeon: Guille Templeton MD;  Location: [address];  Service: Neurosurgery;  Laterality: N/A;    REMOVAL OF HARDWARE FROM SPINE N/A 2/21/2022    Procedure: REMOVAL, HARDWARE, SPINE;  Surgeon:  Guille Templeton MD;  Location: [facility] OR [address] [place] [address];  Service: Neurosurgery;  Laterality: N/A;  [place]    SPINE SURGERY      THORACIC LAMINECTOMY WITH FUSION N/A 2/2/2023    Procedure: LAMINECTOMY, SPINE, THORACIC, WITH FUSION (T4-Pelvis fusion w/ T9-10 corpectomies) **AIRO;  Surgeon: Guille Templeton MD;  Location: [facility] OR [address] [place] [address];  Service: Neurosurgery;  Laterality: N/A;  AIRO, 2 bovies, aquamantys, Globus, Depuy, antibiotic beads, TXA, Peroxide; Babycos plastics closure       Family History   Problem Relation Age of Onset    Hepatitis Mother     Drug abuse Mother     Liver cancer Mother     Drug abuse Father     Cirrhosis Father     Hepatitis Father        Social History     Socioeconomic History    Marital status:    Tobacco Use    Smoking status: Some Days     Packs/day: 0.50     Years: 23.00     Pack years: 11.50     Types: Cigarettes    Smokeless tobacco: Never    Tobacco comments:     patient states she knows she nees to quit.   Substance and Sexual Activity    Alcohol use: Not Currently     Comment: quit 2014    Drug use: Yes     Frequency: 4.0 times per week     Types: Marijuana     Comment: Patient denies needle use, only inhalation of methampehtamines or orally.  Last known drug use was prior to admission to hospital stay for surgery    Sexual activity: Yes     Partners: Male     Birth control/protection: None     Social Determinants of Health     Financial Resource Strain: Medium Risk    Difficulty of Paying Living Expenses: Somewhat hard   Food Insecurity: No Food Insecurity    Worried About Running Out of Food in the Last Year: Never true    Ran Out of Food in the Last Year: Never true   Transportation Needs: Unmet Transportation Needs    Lack of Transportation (Medical): Yes    Lack of Transportation (Non-Medical): Yes   Physical Activity: Inactive    Days of Exercise per Week: 0 days    Minutes of Exercise per Session: 0 min   Stress: Stress Concern Present    Feeling of  Stress : Very much   Social Connections: Unknown    Frequency of Communication with Friends and Family: More than three times a week    Frequency of Social Gatherings with Friends and Family: Never    Active Member of Clubs or Organizations: No    Attends Club or Organization Meetings: Never    Marital Status:    Housing Stability: High Risk    Unable to Pay for Housing in the Last Year: No    Number of Places Lived in the Last Year: 1    Unstable Housing in the Last Year: Yes       Current Facility-Administered Medications   Medication Dose Route Frequency Provider Last Rate Last Admin    acetaminophen tablet 1,000 mg  1,000 mg Oral Q12H Vincent Bal MD   1,000 mg at 02/20/23 2020    albumin human 25% bottle 100 g  100 g Intravenous Once Stephanie Nelson MD        bisacodyL suppository 10 mg  10 mg Rectal Every other day Rachel Marlow MD   10 mg at 02/12/23 0931    bumetanide tablet 1 mg  1 mg Oral Daily Stephanie Nelson MD        buPROPion TB24 tablet 150 mg  150 mg Oral Daily Vincent Bal MD   150 mg at 02/21/23 1000    dextrose 10% bolus 125 mL 125 mL  12.5 g Intravenous PRN Marvin Issa Miguel, DO        dextrose 10% bolus 250 mL 250 mL  25 g Intravenous PRN Marvin Issa Miguel, DO        diphenhydrAMINE capsule 25 mg  25 mg Oral On Call Procedure Vincent Bal MD   25 mg at 02/15/23 0021    DULoxetine DR capsule 30 mg  30 mg Oral BID Vincent Bal MD   30 mg at 02/21/23 1000    gabapentin capsule 900 mg  900 mg Oral TID Geovani Richter MD   900 mg at 02/21/23 1429    glucagon (human recombinant) injection 1 mg  1 mg Intramuscular PRN Marvin Issa Miguel, DO        glucose chewable tablet 16 g  16 g Oral PRN Marvin Issa Miguel, DO        glucose chewable tablet 24 g  24 g Oral PRN Marvin Issa Miguel, DO        heparin (porcine) injection 5,000 Units  5,000 Units Subcutaneous Q8H Sonya Lainez NP   5,000 Units at 02/21/23 1429    hydrALAZINE tablet 25 mg  25 mg Oral  Q8H PRN Danny Ding MD        HYDROmorphone injection 0.5 mg  0.5 mg Intravenous Q4H PRN Geovani Richter MD   0.5 mg at 02/20/23 1245    hydrOXYzine HCL tablet 25 mg  25 mg Oral TID PRN Jeffrey Robin DO   25 mg at 02/15/23 2200    lactulose 20 gram/30 mL solution Soln 20 g  20 g Oral TID Rachel Marlow MD   20 g at 02/20/23 2020    melatonin tablet 6 mg  6 mg Oral Nightly PRN Hal Barger Jr., MD   6 mg at 02/20/23 2020    meropenem (MERREM) 2 g in sodium chloride 0.9% 100 mL IVPB  2 g Intravenous Q8H Sagar Lamas DO   Stopped at 02/21/23 1306    methocarbamoL tablet 1,000 mg  1,000 mg Oral Q6H Geovani Richter MD   1,000 mg at 02/21/23 1205    naloxone 0.4 mg/mL injection 0.02 mg  0.02 mg Intravenous PRN Marvin Rivera DO        ondansetron injection 4 mg  4 mg Intravenous Q6H PRN Andreea Geronimo PA-C   4 mg at 02/10/23 0000    oxyCODONE immediate release tablet 5 mg  5 mg Oral Q3H PRN Geovani Richter MD   5 mg at 02/21/23 0441    And    oxyCODONE immediate release tablet Tab 10 mg  10 mg Oral Q3H PRN Geovani Richter MD   10 mg at 02/20/23 1837    pantoprazole EC tablet 40 mg  40 mg Oral Daily Hal Barger Jr., MD   40 mg at 02/21/23 1000    polyethylene glycol packet 17 g  17 g Oral Daily PRN Oliver Méndez MD   17 g at 01/23/23 1256    predniSONE tablet 50 mg  50 mg Oral On Call Procedure Vincent Bal MD   50 mg at 02/15/23 0021    prochlorperazine injection Soln 2.5 mg  2.5 mg Intravenous Q6H PRN Vaishnavi Adames PA-C   2.5 mg at 02/20/23 0720    senna-docusate 8.6-50 mg per tablet 1 tablet  1 tablet Oral BID PRN Mary Archer MD        simethicone chewable tablet 80 mg  1 tablet Oral TID PRN Vincent Bal MD        sodium chloride 0.9% flush 10 mL  10 mL Intravenous Q6H Eber Montoya DO   10 mL at 02/21/23 1200    And    sodium chloride 0.9% flush 10 mL  10 mL Intravenous PRN Eber Montoya DO           [unfilled]    Review of patient's  allergies indicates:   Allergen Reactions    Bee venom protein (honey bee) Anaphylaxis     Patient reports she is allergic to bee stings.    Naproxen Anaphylaxis     Throat closing    12-23- Patient reports taking Ibuprofen 200 mg at home without problems. Verified X3. [place]    Wasp sting [allergen ext-venom-honey bee] Anaphylaxis    Adhesive Blisters    Shellfish containing products     Iodine and iodide containing products Hives and Itching     Allergic to iodine in seafood only    Nuts [tree nut] Rash        ROS       Physical Exam     Problem List Items Addressed This Visit        Unprioritized    Anxiety and depression (Chronic)    Chronic hepatitis C with cirrhosis (Chronic)    Chronic pain syndrome    Debility    E coli infection    Hyponatremia    Current Assessment & Plan     42 y.o F w/ hx of IV drug use (meth), chronic HCV with cirrhosis, spinal fusion (04/2021), epidural abscess with removal of hardware (02/2022), spinal fusion with hardware (T10 - Pelvis / 03/2022), and neurogenic bladder here with complicated ESBL E.coli epidural abscess s/p washout,corpectomy, fixation being treated w/ meropenem.     Nephrology consulted for hyponatremia. Patient reports uncomfortable abdominal distension and leg swelling. Reports drinking 3-4 cups of her 30oz water tumbler daily. Urine Na <10, urine osm 513. Serum osm 283. Serum Na stable. Normal mentation.    Hyponatremia likely 2/2 to low effective circulating volume 2/2 cirrhosis. Possible poor solute intake relative to free water intake. Unlikely SIADH given low urine Na.   Repeat urine studies 2/14 suggestive of appropriate ADH in setting of cirrhosis    Will stop Bumax and start 25% Albumin 100 ml Q8 hours.     Will consider Norepinephrine if the BP is sill low.     Continue fluid restriction, though needs to have at least 500 cc intake           Kyphosis of thoracolumbar region    Myelopathy    Pulmonary hypertension    S/P spinal fusion - Primary     Spondylodiscitis    Thoracic discitis    Thrombocytopenia    * (Principal) Weakness of both lower extremities    Current Assessment & Plan     43 yo F w/ PMHx of IVDU (amphetamines, 3 years clean), Cirrhosis 2/2 HCV, spinal fusion (4/2021), epidural abscess w/ removal of hardware (2/2022) w/ subsequent T10 spinal fusion w/ hardware (3/2022) and neurogenic bladder presented to [facility] from [facility] for further imaging eval of back and L leg weakness. Pt was at OSH for E.coli ESBL Bacteremia tx w/ 7 days of Meropenem (12/26-1/3) and Candida UTI w/ 7 days Fluconazole (12/26-1/1). Pt was supposed to be d/c'd 1/4 but was too debilitated to support her own weight w/ a walker. At OSH, while working w/ PT/OT, she was having worsening LE weakness and unable to walk; intermittent control over voiding and BM; Babinski pos bilat. MRI cervical spine pos for multilevel spondylosis.  MRI brain w/ nonspecific gliotic white matter signal change within bilateral cerebral hemispheres similar in extent and distribution greater than expected for patient age. X-ray of spine pos for multilevel thoracolumbar fusion and diskectomy.  Also, a focal kyphosis at T9-10 increased when compared to prior scoliosis radiographs. She has been on ppx amoxicillin due to her hx of infected hardware since 6 days PTA. OSH unable to perform spinal MRI due to body size, transferred here to continue workup. NGSY consulted, rec MRI T/L spine w/ w/o contrast and CT T/L spine thin cuts; MRI T/L spine showing extensive edema signal throughout T-L posterior paraspinal mcs; cord w/ questionable signal in cervical and suppoer thoracic spine (minimal), focal kyphosis T8-T10 with severe anterior height loss of the T9 vertebral body.  Possible associated cord edema signal, conus unable to be visualized 2/2 artifact. CT pending.        Other Visit Diagnoses     Lower extremity weakness        Sepsis, due to unspecified organism, unspecified whether acute organ  dysfunction present        Chest pain        Tachycardia        Medication adverse effect                 DULCE CEBALLOS.Rick. MD. DARRYN. FACP.  , [employer]  Nephrology Consultant. [facility].   [facility]. [address]th [place] [address].    email: [email or web address].  Tel: Office: [phone]

## 2023-02-21 NOTE — ASSESSMENT & PLAN NOTE
Hb 6.4 on 2/6, received 1U pRBCs. Hb 2/7 WNL.  - monitor Hb  - transfuse to maintain Hb >7  2/9    Patient's with Normocytic anemia.. Hemoglobin stable. Etiology likely due to chronic disease .  Current CBC reviewed-    Recent Labs   Lab 02/19/23  1255 02/20/23  0353 02/21/23  0436   HGB 8.0* 8.3* 8.6*    Monitor CBC and transfuse if H/H drops below 7/21.   2/14 Hb 6.9 Transfusion with 1 unit of PRBC . FOBT negative  2/17: hgb 8.1, stable  2/18: hbg 8.2 stable   2/19 stable , hbg 8

## 2023-02-21 NOTE — ASSESSMENT & PLAN NOTE
- Patient with sodium   Recent Labs   Lab 02/19/23  1255 02/20/23  0351 02/21/23  0436   * 129* 127*   . sodium trending down    2/9 monitor on lasix BID.sodium trended down to 127.   2/10 sodium stable at 127 . continues with increased drain output 985ml/24h . negative balance of  2.8 L lasix on hold.  nephrology consulted.  Nephrology recs  fluid restriction to 1 L,  lasix 60 mg IV daily,  and trend sodium q12h.  diazepam discontinued   2/11  noncompliant with  fluid restriction to 1 L despite counseling. sodium trended down to 126 .received 2 doses of IV lasix 60mg. Increased Lasix IV 80 BID  due to UOP 500ml/24h   2/12 UOP of 1250ml/24h.sodium trended up to 129   2/14 sodium trended up to 130 . Diuresing well with lasix IV .   2/15 started on salt tablets 1g BID and lasix drip 10mg/h x 6h  2/16 continue Lasix 40 IVP x 1 with Lasix drip 10mg/hr x 6   2/17 sodium 134  2/18 sodium 131. On lasix drip . Increased from 5 to 10  2/19 sodium 130, lasix stopped   2/20 Bumex initiated per Nephrology recs  Will continue to monitor and adjust diuretic needs given hypotension

## 2023-02-21 NOTE — PROGRESS NOTES
Hepatology Progress Note    Rachel Zazueta is a 42 y.o. female admitted to hospital 1/19/2023 (Hospital Day: 34) due to Weakness of both lower extremities.     Interval History  Patient seen this AM. No acute events overnight. Having excellent response to diuresis -35 L fluid output this admission. U/O yesterday 1.4 L.     Now on Bumex & Aldactone. On a fluid restriction.     Patient in better spirits today. Enquires about HCV rx.   Objective  Temp:  [97.8 °F (36.6 °C)-98.2 °F (36.8 °C)] 97.9 °F (36.6 °C) (02/21 1452)  Pulse:  [] 103 (02/21 1452)  BP: ()/(42-55) 103/55 (02/21 1452)  Resp:  [15-20] 18 (02/21 1452)  SpO2:  [95 %-100 %] 100 % (02/21 1452)    Constitutional: Appears well-developed and well-nourished. severe obesity  Head: Normocephalic and atraumatic.   Neck: Normal range of motion. Neck supple.   Cardiovascular: Normal heart rate.  Regular heart rhythm.  Pulmonary/Chest: Effort normal.   Abdominal:+  distension.  No tenderness  Musculoskeletal: Normal range of motion. ++ edema. drains in place   Neurological: Alert and oriented to person, place, and time. LE strength - 3-4/5   Skin: Skin is warm and dry.   Psychiatric: Normal mood and affect. Behavior is normal.     Laboratory    Lab Results   Component Value Date    WBC 3.52 (L) 02/21/2023    HGB 8.6 (L) 02/21/2023    HCT 28.0 (L) 02/21/2023    MCV 91 02/21/2023    PLT 94 (L) 02/21/2023       Lab Results   Component Value Date     (L) 02/21/2023    K 3.9 02/21/2023    CL 93 (L) 02/21/2023    CO2 30 (H) 02/21/2023    BUN 22 (H) 02/21/2023    CREATININE 0.5 02/21/2023    CALCIUM 7.9 (L) 02/21/2023       Lab Results   Component Value Date    ALBUMIN 1.9 (L) 02/21/2023    ALT 33 02/21/2023    AST 53 (H) 02/21/2023    GGT 57 (H) 02/07/2022    ALKPHOS 171 (H) 02/21/2023    BILITOT 1.1 (H) 02/21/2023       Lab Results   Component Value Date    INR 1.2 02/21/2023    INR 1.2 02/20/2023    INR 1.3 (H) 02/17/2023       MELD-Na score: 9 at  2/21/2023  4:36 AM  MELD score: 9 at 2/21/2023  4:36 AM  Calculated from:  Serum Creatinine: 0.5 mg/dL (Using min of 1 mg/dL) at 2/21/2023  4:36 AM  Serum Sodium: 127 mmol/L at 2/21/2023  4:36 AM  Total Bilirubin: 1.1 mg/dL at 2/21/2023  4:36 AM  INR(ratio): 1.2 at 2/21/2023  4:36 AM  Age: 42 years    Assessment     Rachel Zazueta is a 42 y.o. female with history of IVDU (methamphetamine), HCV cirrhosis, spinal fusion with hardware with history of epidural abscess and hardware removal (Feb 2022) who is here for thoracic discitis with ESBL E. Coli s/p laminectomy, thoracic-pelvis fusion with corpectomies by NSGY and flap closures by plastics. 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 Would likely be from recent surgical interventions interrupting lymphatic drainage. Has known HCV cirrhosis, no prior ascites. Ascitic TG level pending. SAAG elevated consistent with portal HTN. 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 AFB culture stain negative. TTE showed EF 58%.     Problem List:  Chylous ascites  HCV cirrhosis  MELD-Na score: 9 at 2/21/2023  4:36 AM  MELD score: 9 at 2/21/2023  4:36 AM  Calculated from:  Serum Creatinine: 0.5 mg/dL (Using min of 1 mg/dL) at 2/21/2023  4:36 AM  Serum Sodium: 127 mmol/L at 2/21/2023  4:36 AM  Total Bilirubin: 1.1 mg/dL at 2/21/2023  4:36 AM  INR(ratio): 1.2 at 2/21/2023  4:36 AM  Age: 42 years     Plan  - Continue diuresis per nephrology   - Low sodium, high-protein and low-fat diet with medium-chain triglycerides (MCT) diet.   - Large volume paracentesis as needed.   - Hold off on octreotide for now.   - If patient continues to have worsening ascites, will consult IR again for possible lymphangiogram and/or lymphatic repair  - Follow AFB culture.   - please obtain daily CBC, CMP, INR  - Plan  of care was discussed with primary team.   - Have messaged Isela THEODORE) to arrange outpatient follow up in 4-6 weeks.     Thank you for involving us in the care of Rachel Zazueta. Please call with any additional concerns or questions.We will sign off.     Luis Angel Chandra MD, PGY-IV  Gastroenterology Fellow  [facility]

## 2023-02-21 NOTE — SUBJECTIVE & OBJECTIVE
Interval History: The patient is sleeping in the bed all the time. She said she is feeling stable. Her labs showed that the Na had decreased to 127micmol/l. She is restricting the fluid intake. Her BP is on the low side with systolic below  100mmHg. We would recommend to stop the Bumitanide and avoid any lasix bolus. At this stage Albumin infusion 100 ml of  25 % Q 8 hours. If the BP did not improve we will have to consider Norepinephrine      Review of patient's allergies indicates:   Allergen Reactions    Bee venom protein (honey bee) Anaphylaxis     Patient reports she is allergic to bee stings.    Naproxen Anaphylaxis     Throat closing    12-23- Patient reports taking Ibuprofen 200 mg at home without problems. Verified X3. [place]    Wasp sting [allergen ext-venom-honey bee] Anaphylaxis    Adhesive Blisters    Shellfish containing products     Iodine and iodide containing products Hives and Itching     Allergic to iodine in seafood only    Nuts [tree nut] Rash     Current Facility-Administered Medications   Medication Frequency    acetaminophen tablet 1,000 mg Q12H    albumin human 25% bottle 100 g Once    bisacodyL suppository 10 mg Every other day    bumetanide tablet 1 mg Daily    buPROPion TB24 tablet 150 mg Daily    dextrose 10% bolus 125 mL 125 mL PRN    dextrose 10% bolus 250 mL 250 mL PRN    diphenhydrAMINE capsule 25 mg On Call Procedure    DULoxetine DR capsule 30 mg BID    gabapentin capsule 900 mg TID    glucagon (human recombinant) injection 1 mg PRN    glucose chewable tablet 16 g PRN    glucose chewable tablet 24 g PRN    heparin (porcine) injection 5,000 Units Q8H    hydrALAZINE tablet 25 mg Q8H PRN    HYDROmorphone injection 0.5 mg Q4H PRN    hydrOXYzine HCL tablet 25 mg TID PRN    lactulose 20 gram/30 mL solution Soln 20 g TID    melatonin tablet 6 mg Nightly PRN    meropenem (MERREM) 2 g in sodium chloride 0.9% 100 mL IVPB Q8H    methocarbamoL tablet 1,000 mg Q6H    naloxone 0.4 mg/mL injection  0.02 mg PRN    ondansetron injection 4 mg Q6H PRN    oxyCODONE immediate release tablet 5 mg Q3H PRN    And    oxyCODONE immediate release tablet Tab 10 mg Q3H PRN    pantoprazole EC tablet 40 mg Daily    polyethylene glycol packet 17 g Daily PRN    predniSONE tablet 50 mg On Call Procedure    prochlorperazine injection Soln 2.5 mg Q6H PRN    senna-docusate 8.6-50 mg per tablet 1 tablet BID PRN    simethicone chewable tablet 80 mg TID PRN    sodium chloride 0.9% flush 10 mL Q6H    And    sodium chloride 0.9% flush 10 mL PRN       Objective:     Vital Signs (Most Recent):  Temp: 97.9 °F (36.6 °C) (02/21/23 1452)  Pulse: 106 (02/21/23 1531)  Resp: 18 (02/21/23 1452)  BP: (!) 103/55 (02/21/23 1452)  SpO2: 100 % (02/21/23 1452)   Vital Signs (24h Range):  Temp:  [97.8 °F (36.6 °C)-98.2 °F (36.8 °C)] 97.9 °F (36.6 °C)  Pulse:  [] 106  Resp:  [15-20] 18  SpO2:  [96 %-100 %] 100 %  BP: ()/(42-55) 103/55     Weight: (!) 142 kg (313 lb) (02/20/23 0556)  Body mass index is 49.02 kg/m².  Body surface area is 2.59 meters squared.    I/O last 3 completed shifts:  In: 450 [P.O.:450]  Out: 2185 [Urine:1800; Drains:385]    Vitals:    02/21/23 1031 02/21/23 1135 02/21/23 1452 02/21/23 1531   BP: (!) 89/50  (!) 103/55    BP Location: Right arm  Right arm    Patient Position: Lying  Lying    Pulse: 101 102 103 106   Resp: 18  18    Temp: 97.9 °F (36.6 °C)  97.9 °F (36.6 °C)    TempSrc: Oral  Oral    SpO2: 98%  100%    Weight:       Height:            Physical Exam  Constitutional:       Comments: Pale. Sleeping most of the time.    HENT:      Head: Normocephalic.   Cardiovascular:      Rate and Rhythm: Normal rate.      Pulses: Normal pulses.   Pulmonary:      Effort: Pulmonary effort is normal.   Musculoskeletal:      Cervical back: Neck supple.      Right lower leg: Edema present.      Left lower leg: Edema present.   Neurological:      Mental Status: She is alert.       Significant Labs:  CBC:   Recent Labs   Lab  02/21/23 0436   WBC 3.52*   RBC 3.08*   HGB 8.6*   HCT 28.0*   PLT 94*   MCV 91   MCH 27.9   MCHC 30.7*     CMP:   Recent Labs   Lab 02/21/23 0436   GLU 87   CALCIUM 7.9*   ALBUMIN 1.9*   PROT 5.2*   *   K 3.9   CO2 30*   CL 93*   BUN 22*   CREATININE 0.5   ALKPHOS 171*   ALT 33   AST 53*   BILITOT 1.1*     LFTs:   Recent Labs   Lab 02/21/23 0436   ALT 33   AST 53*   ALKPHOS 171*   BILITOT 1.1*   PROT 5.2*   ALBUMIN 1.9*

## 2023-02-21 NOTE — SUBJECTIVE & OBJECTIVE
Interval History: No events overnight. UOP documented 400mL for last 24hrs.  Soft BP.  Spironolactone discontinued for now.  Per discussion with Nephrology, IV Lasix transitioned to IV Bumex.  Patient reports no acute issues today.  Abdominal distension stable to slightly enlarged.    Review of Systems   Constitutional:  Positive for appetite change (so-so). Negative for chills and fever.   HENT:  Negative for congestion, sore throat and trouble swallowing.    Eyes:  Negative for visual disturbance.   Respiratory:  Negative for cough, chest tightness and shortness of breath.    Cardiovascular:  Positive for leg swelling. Negative for chest pain.   Gastrointestinal:  Positive for abdominal distention. Negative for abdominal pain, constipation, diarrhea and nausea.   Genitourinary:  Negative for dysuria.   Musculoskeletal:  Positive for arthralgias.   Skin:  Negative for rash.   Neurological:  Negative for dizziness, weakness and light-headedness.   Psychiatric/Behavioral:  Negative for decreased concentration.    Objective:     Vital Signs (Most Recent):  Temp: 97.9 °F (36.6 °C) (02/21/23 1031)  Pulse: 102 (02/21/23 1135)  Resp: 18 (02/21/23 1031)  BP: (!) 89/50 (02/21/23 1031)  SpO2: 98 % (02/21/23 1031)   Vital Signs (24h Range):  Temp:  [97.8 °F (36.6 °C)-98.2 °F (36.8 °C)] 97.9 °F (36.6 °C)  Pulse:  [] 102  Resp:  [15-20] 18  SpO2:  [95 %-100 %] 98 %  BP: ()/(42-55) 89/50     Weight: (!) 142 kg (313 lb)  Body mass index is 49.02 kg/m².    Intake/Output Summary (Last 24 hours) at 2/21/2023 1336  Last data filed at 2/21/2023 0639  Gross per 24 hour   Intake 450 ml   Output 1485 ml   Net -1035 ml        Physical Exam  Vitals and nursing note reviewed.   Constitutional:       General: She is not in acute distress.     Appearance: She is well-developed. She is obese. She is ill-appearing. She is not toxic-appearing or diaphoretic.   HENT:      Head: Normocephalic and atraumatic.      Right Ear: External  ear normal.      Left Ear: External ear normal.      Nose: Nose normal.      Mouth/Throat:      Mouth: Mucous membranes are moist.      Pharynx: Oropharynx is clear.   Eyes:      General: No scleral icterus.        Right eye: No discharge.   Cardiovascular:      Rate and Rhythm: Normal rate and regular rhythm.      Pulses: Normal pulses.      Heart sounds: Normal heart sounds.   Pulmonary:      Effort: Pulmonary effort is normal.      Breath sounds: Normal breath sounds.   Abdominal:      General: There is distension.      Palpations: Abdomen is soft.      Tenderness: There is no abdominal tenderness. There is no guarding or rebound.      Comments: 4 drains in place with serosanguinous fluid   Musculoskeletal:         General: No tenderness.      Cervical back: Neck supple.      Right lower leg: Edema present.      Left lower leg: Edema present.   Skin:     General: Skin is warm and dry.   Neurological:      Mental Status: She is alert and oriented to person, place, and time.   Psychiatric:         Behavior: Behavior normal.       Significant Labs: All pertinent labs within the past 24 hours have been reviewed.      Recent Results (from the past 24 hour(s))   Comprehensive Metabolic Panel    Collection Time: 02/21/23  4:36 AM   Result Value Ref Range    Sodium 127 (L) 136 - 145 mmol/L    Potassium 3.9 3.5 - 5.1 mmol/L    Chloride 93 (L) 95 - 110 mmol/L    CO2 30 (H) 23 - 29 mmol/L    Glucose 87 70 - 110 mg/dL    BUN 22 (H) 6 - 20 mg/dL    Creatinine 0.5 0.5 - 1.4 mg/dL    Calcium 7.9 (L) 8.7 - 10.5 mg/dL    Total Protein 5.2 (L) 6.0 - 8.4 g/dL    Albumin 1.9 (L) 3.5 - 5.2 g/dL    Total Bilirubin 1.1 (H) 0.1 - 1.0 mg/dL    Alkaline Phosphatase 171 (H) 55 - 135 U/L    AST 53 (H) 10 - 40 U/L    ALT 33 10 - 44 U/L    Anion Gap 4 (L) 8 - 16 mmol/L    eGFR >60.0 >60 mL/min/1.73 m^2   Magnesium    Collection Time: 02/21/23  4:36 AM   Result Value Ref Range    Magnesium 2.0 1.6 - 2.6 mg/dL   Phosphorus    Collection Time:  02/21/23  4:36 AM   Result Value Ref Range    Phosphorus 3.2 2.7 - 4.5 mg/dL   CBC Auto Differential    Collection Time: 02/21/23  4:36 AM   Result Value Ref Range    WBC 3.52 (L) 3.90 - 12.70 K/uL    RBC 3.08 (L) 4.00 - 5.40 M/uL    Hemoglobin 8.6 (L) 12.0 - 16.0 g/dL    Hematocrit 28.0 (L) 37.0 - 48.5 %    MCV 91 82 - 98 fL    MCH 27.9 27.0 - 31.0 pg    MCHC 30.7 (L) 32.0 - 36.0 g/dL    RDW 17.5 (H) 11.5 - 14.5 %    Platelets 94 (L) 150 - 450 K/uL    MPV 9.3 9.2 - 12.9 fL    Immature Granulocytes 0.3 0.0 - 0.5 %    Gran # (ANC) 2.2 1.8 - 7.7 K/uL    Immature Grans (Abs) 0.01 0.00 - 0.04 K/uL    Lymph # 0.6 (L) 1.0 - 4.8 K/uL    Mono # 0.6 0.3 - 1.0 K/uL    Eos # 0.2 0.0 - 0.5 K/uL    Baso # 0.04 0.00 - 0.20 K/uL    nRBC 0 0 /100 WBC    Gran % 61.7 38.0 - 73.0 %    Lymph % 15.6 (L) 18.0 - 48.0 %    Mono % 16.5 (H) 4.0 - 15.0 %    Eosinophil % 4.8 0.0 - 8.0 %    Basophil % 1.1 0.0 - 1.9 %    Differential Method Automated    Protime-INR    Collection Time: 02/21/23  4:36 AM   Result Value Ref Range    Prothrombin Time 12.3 9.0 - 12.5 sec    INR 1.2 0.8 - 1.2       Significant Imaging: I have reviewed all pertinent imaging results/findings within the past 24 hours.

## 2023-02-22 ENCOUNTER — PATIENT MESSAGE (OUTPATIENT)
Dept: ADMINISTRATIVE | Facility: OTHER | Age: 43
End: 2023-02-22
Payer: MEDICAID

## 2023-02-22 LAB
ALBUMIN SERPL BCP-MCNC: 2.9 G/DL (ref 3.5–5.2)
ALP SERPL-CCNC: 116 U/L (ref 55–135)
ALT SERPL W/O P-5'-P-CCNC: 30 U/L (ref 10–44)
ANION GAP SERPL CALC-SCNC: 6 MMOL/L (ref 8–16)
ANISOCYTOSIS BLD QL SMEAR: SLIGHT
AST SERPL-CCNC: 62 U/L (ref 10–40)
BASOPHILS NFR BLD: 5 % (ref 0–1.9)
BILIRUB SERPL-MCNC: 1.3 MG/DL (ref 0.1–1)
BUN SERPL-MCNC: 19 MG/DL (ref 6–20)
CALCIUM SERPL-MCNC: 8.5 MG/DL (ref 8.7–10.5)
CHLORIDE SERPL-SCNC: 94 MMOL/L (ref 95–110)
CO2 SERPL-SCNC: 30 MMOL/L (ref 23–29)
CREAT SERPL-MCNC: 0.5 MG/DL (ref 0.5–1.4)
DACRYOCYTES BLD QL SMEAR: ABNORMAL
DIFFERENTIAL METHOD: ABNORMAL
EOSINOPHIL NFR BLD: 5 % (ref 0–8)
ERYTHROCYTE [DISTWIDTH] IN BLOOD BY AUTOMATED COUNT: 17.3 % (ref 11.5–14.5)
EST. GFR  (NO RACE VARIABLE): >60 ML/MIN/1.73 M^2
FUNGUS SPEC CULT: NORMAL
GLUCOSE SERPL-MCNC: 80 MG/DL (ref 70–110)
HCT VFR BLD AUTO: 21.3 % (ref 37–48.5)
HCV RNA SERPL NAA+PROBE-LOG IU: 5 LOGIU/ML
HCV RNA SERPL QL NAA+PROBE: DETECTED
HCV RNA SPEC NAA+PROBE-ACNC: ABNORMAL IU/ML
HGB BLD-MCNC: 6.7 G/DL (ref 12–16)
HYPOCHROMIA BLD QL SMEAR: ABNORMAL
IMM GRANULOCYTES # BLD AUTO: ABNORMAL K/UL (ref 0–0.04)
IMM GRANULOCYTES NFR BLD AUTO: ABNORMAL % (ref 0–0.5)
INR PPP: 1.4 (ref 0.8–1.2)
LYMPHOCYTES NFR BLD: 5 % (ref 18–48)
MAGNESIUM SERPL-MCNC: 2 MG/DL (ref 1.6–2.6)
MCH RBC QN AUTO: 29 PG (ref 27–31)
MCHC RBC AUTO-ENTMCNC: 31.5 G/DL (ref 32–36)
MCV RBC AUTO: 92 FL (ref 82–98)
MONOCYTES NFR BLD: 20 % (ref 4–15)
NEUTROPHILS NFR BLD: 65 % (ref 38–73)
NRBC BLD-RTO: 0 /100 WBC
OVALOCYTES BLD QL SMEAR: ABNORMAL
PHOSPHATE SERPL-MCNC: 2.4 MG/DL (ref 2.7–4.5)
PLATELET # BLD AUTO: 39 K/UL (ref 150–450)
PLATELET BLD QL SMEAR: ABNORMAL
PMV BLD AUTO: 10.7 FL (ref 9.2–12.9)
POIKILOCYTOSIS BLD QL SMEAR: SLIGHT
POTASSIUM SERPL-SCNC: 3.8 MMOL/L (ref 3.5–5.1)
PROT SERPL-MCNC: 5.1 G/DL (ref 6–8.4)
PROTHROMBIN TIME: 14 SEC (ref 9–12.5)
RBC # BLD AUTO: 2.31 M/UL (ref 4–5.4)
SODIUM SERPL-SCNC: 130 MMOL/L (ref 136–145)
SPHEROCYTES BLD QL SMEAR: ABNORMAL
WBC # BLD AUTO: 0.51 K/UL (ref 3.9–12.7)

## 2023-02-22 PROCEDURE — 27000207 HC ISOLATION

## 2023-02-22 PROCEDURE — 85027 COMPLETE CBC AUTOMATED: CPT

## 2023-02-22 PROCEDURE — 63600175 PHARM REV CODE 636 W HCPCS: Mod: JG | Performed by: STUDENT IN AN ORGANIZED HEALTH CARE EDUCATION/TRAINING PROGRAM

## 2023-02-22 PROCEDURE — 99900031 HC PATIENT EDUCATION (STAT)

## 2023-02-22 PROCEDURE — 94761 N-INVAS EAR/PLS OXIMETRY MLT: CPT

## 2023-02-22 PROCEDURE — 83735 ASSAY OF MAGNESIUM: CPT | Performed by: STUDENT IN AN ORGANIZED HEALTH CARE EDUCATION/TRAINING PROGRAM

## 2023-02-22 PROCEDURE — P9047 ALBUMIN (HUMAN), 25%, 50ML: HCPCS | Mod: JG | Performed by: STUDENT IN AN ORGANIZED HEALTH CARE EDUCATION/TRAINING PROGRAM

## 2023-02-22 PROCEDURE — 99233 PR SUBSEQUENT HOSPITAL CARE,LEVL III: ICD-10-PCS | Mod: ,,, | Performed by: STUDENT IN AN ORGANIZED HEALTH CARE EDUCATION/TRAINING PROGRAM

## 2023-02-22 PROCEDURE — 25000003 PHARM REV CODE 250: Performed by: HOSPITALIST

## 2023-02-22 PROCEDURE — 20600001 HC STEP DOWN PRIVATE ROOM

## 2023-02-22 PROCEDURE — 25000003 PHARM REV CODE 250

## 2023-02-22 PROCEDURE — 25000003 PHARM REV CODE 250: Performed by: PSYCHIATRY & NEUROLOGY

## 2023-02-22 PROCEDURE — 63600175 PHARM REV CODE 636 W HCPCS: Performed by: NURSE PRACTITIONER

## 2023-02-22 PROCEDURE — 84100 ASSAY OF PHOSPHORUS: CPT | Performed by: STUDENT IN AN ORGANIZED HEALTH CARE EDUCATION/TRAINING PROGRAM

## 2023-02-22 PROCEDURE — 99233 SBSQ HOSP IP/OBS HIGH 50: CPT | Mod: ,,, | Performed by: STUDENT IN AN ORGANIZED HEALTH CARE EDUCATION/TRAINING PROGRAM

## 2023-02-22 PROCEDURE — 63600175 PHARM REV CODE 636 W HCPCS

## 2023-02-22 PROCEDURE — 99233 SBSQ HOSP IP/OBS HIGH 50: CPT | Mod: ,,, | Performed by: INTERNAL MEDICINE

## 2023-02-22 PROCEDURE — 25000003 PHARM REV CODE 250: Performed by: INTERNAL MEDICINE

## 2023-02-22 PROCEDURE — 85007 BL SMEAR W/DIFF WBC COUNT: CPT

## 2023-02-22 PROCEDURE — 25000003 PHARM REV CODE 250: Performed by: STUDENT IN AN ORGANIZED HEALTH CARE EDUCATION/TRAINING PROGRAM

## 2023-02-22 PROCEDURE — A4216 STERILE WATER/SALINE, 10 ML: HCPCS | Performed by: PSYCHIATRY & NEUROLOGY

## 2023-02-22 PROCEDURE — 80053 COMPREHEN METABOLIC PANEL: CPT | Performed by: STUDENT IN AN ORGANIZED HEALTH CARE EDUCATION/TRAINING PROGRAM

## 2023-02-22 PROCEDURE — 99233 PR SUBSEQUENT HOSPITAL CARE,LEVL III: ICD-10-PCS | Mod: ,,, | Performed by: INTERNAL MEDICINE

## 2023-02-22 PROCEDURE — 85610 PROTHROMBIN TIME: CPT | Performed by: STUDENT IN AN ORGANIZED HEALTH CARE EDUCATION/TRAINING PROGRAM

## 2023-02-22 RX ORDER — ALBUMIN HUMAN 250 G/1000ML
25 SOLUTION INTRAVENOUS EVERY 8 HOURS
Status: COMPLETED | OUTPATIENT
Start: 2023-02-22 | End: 2023-02-23

## 2023-02-22 RX ADMIN — OXYCODONE HYDROCHLORIDE 10 MG: 10 TABLET ORAL at 04:02

## 2023-02-22 RX ADMIN — Medication 10 ML: at 06:02

## 2023-02-22 RX ADMIN — METHOCARBAMOL 1000 MG: 500 TABLET ORAL at 12:02

## 2023-02-22 RX ADMIN — METHOCARBAMOL 1000 MG: 500 TABLET ORAL at 05:02

## 2023-02-22 RX ADMIN — BISACODYL 10 MG: 10 SUPPOSITORY RECTAL at 08:02

## 2023-02-22 RX ADMIN — HEPARIN SODIUM 5000 UNITS: 5000 INJECTION INTRAVENOUS; SUBCUTANEOUS at 05:02

## 2023-02-22 RX ADMIN — GABAPENTIN 900 MG: 300 CAPSULE ORAL at 09:02

## 2023-02-22 RX ADMIN — ACETAMINOPHEN 1000 MG: 500 TABLET ORAL at 09:02

## 2023-02-22 RX ADMIN — ALBUMIN (HUMAN) 25 G: 12.5 SOLUTION INTRAVENOUS at 09:02

## 2023-02-22 RX ADMIN — OXYCODONE HYDROCHLORIDE 10 MG: 10 TABLET ORAL at 08:02

## 2023-02-22 RX ADMIN — GABAPENTIN 900 MG: 300 CAPSULE ORAL at 08:02

## 2023-02-22 RX ADMIN — PANTOPRAZOLE SODIUM 40 MG: 40 TABLET, DELAYED RELEASE ORAL at 08:02

## 2023-02-22 RX ADMIN — METHOCARBAMOL 1000 MG: 500 TABLET ORAL at 04:02

## 2023-02-22 RX ADMIN — MEROPENEM 2 G: 1 INJECTION INTRAVENOUS at 09:02

## 2023-02-22 RX ADMIN — HEPARIN SODIUM 5000 UNITS: 5000 INJECTION INTRAVENOUS; SUBCUTANEOUS at 09:02

## 2023-02-22 RX ADMIN — MEROPENEM 2 G: 1 INJECTION INTRAVENOUS at 05:02

## 2023-02-22 RX ADMIN — OXYCODONE HYDROCHLORIDE 10 MG: 10 TABLET ORAL at 12:02

## 2023-02-22 RX ADMIN — LACTULOSE 20 G: 20 SOLUTION ORAL at 02:02

## 2023-02-22 RX ADMIN — GABAPENTIN 900 MG: 300 CAPSULE ORAL at 02:02

## 2023-02-22 RX ADMIN — ONDANSETRON 4 MG: 2 INJECTION INTRAMUSCULAR; INTRAVENOUS at 12:02

## 2023-02-22 RX ADMIN — DULOXETINE 30 MG: 30 CAPSULE, DELAYED RELEASE ORAL at 08:02

## 2023-02-22 RX ADMIN — BUPROPION HYDROCHLORIDE 150 MG: 150 TABLET, FILM COATED, EXTENDED RELEASE ORAL at 08:02

## 2023-02-22 RX ADMIN — Medication 10 ML: at 12:02

## 2023-02-22 RX ADMIN — LACTULOSE 20 G: 20 SOLUTION ORAL at 08:02

## 2023-02-22 RX ADMIN — MEROPENEM 2 G: 1 INJECTION INTRAVENOUS at 12:02

## 2023-02-22 RX ADMIN — DULOXETINE 30 MG: 30 CAPSULE, DELAYED RELEASE ORAL at 09:02

## 2023-02-22 RX ADMIN — HEPARIN SODIUM 5000 UNITS: 5000 INJECTION INTRAVENOUS; SUBCUTANEOUS at 02:02

## 2023-02-22 NOTE — SUBJECTIVE & OBJECTIVE
Interval History: serum sodium stable. Remains on albumin infusions    Review of patient's allergies indicates:   Allergen Reactions    Bee venom protein (honey bee) Anaphylaxis     Patient reports she is allergic to bee stings.    Naproxen Anaphylaxis     Throat closing    12-23- Patient reports taking Ibuprofen 200 mg at home without problems. Verified X3. [place]    Wasp sting [allergen ext-venom-honey bee] Anaphylaxis    Adhesive Blisters    Shellfish containing products     Iodine and iodide containing products Hives and Itching     Allergic to iodine in seafood only    Nuts [tree nut] Rash     Current Facility-Administered Medications   Medication Frequency    acetaminophen tablet 1,000 mg Q12H    bisacodyL suppository 10 mg Every other day    buPROPion TB24 tablet 150 mg Daily    dextrose 10% bolus 125 mL 125 mL PRN    dextrose 10% bolus 250 mL 250 mL PRN    diphenhydrAMINE capsule 25 mg On Call Procedure    DULoxetine DR capsule 30 mg BID    gabapentin capsule 900 mg TID    glucagon (human recombinant) injection 1 mg PRN    glucose chewable tablet 16 g PRN    glucose chewable tablet 24 g PRN    heparin (porcine) injection 5,000 Units Q8H    hydrALAZINE tablet 25 mg Q8H PRN    HYDROmorphone injection 0.5 mg Q4H PRN    hydrOXYzine HCL tablet 25 mg TID PRN    lactulose 20 gram/30 mL solution Soln 20 g TID    melatonin tablet 6 mg Nightly PRN    meropenem (MERREM) 2 g in sodium chloride 0.9% 100 mL IVPB Q8H    methocarbamoL tablet 1,000 mg Q6H    naloxone 0.4 mg/mL injection 0.02 mg PRN    ondansetron injection 4 mg Q6H PRN    oxyCODONE immediate release tablet 5 mg Q3H PRN    And    oxyCODONE immediate release tablet Tab 10 mg Q3H PRN    pantoprazole EC tablet 40 mg Daily    polyethylene glycol packet 17 g Daily PRN    predniSONE tablet 50 mg On Call Procedure    prochlorperazine injection Soln 2.5 mg Q6H PRN    senna-docusate 8.6-50 mg per tablet 1 tablet BID PRN    simethicone chewable tablet 80 mg TID PRN     sodium chloride 0.9% flush 10 mL Q6H    And    sodium chloride 0.9% flush 10 mL PRN       Objective:     Vital Signs (Most Recent):  Temp: 98.3 °F (36.8 °C) (02/22/23 1117)  Pulse: 104 (02/22/23 1117)  Resp: 18 (02/22/23 1209)  BP: (!) 104/51 (02/22/23 1117)  SpO2: 100 % (02/22/23 1117)   Vital Signs (24h Range):  Temp:  [97.9 °F (36.6 °C)-99.3 °F (37.4 °C)] 98.3 °F (36.8 °C)  Pulse:  [101-107] 104  Resp:  [18-19] 18  SpO2:  [96 %-100 %] 100 %  BP: ()/(51-56) 104/51     Weight: (!) 142 kg (313 lb) (02/20/23 0556)  Body mass index is 49.02 kg/m².  Body surface area is 2.59 meters squared.    I/O last 3 completed shifts:  In: 450 [P.O.:450]  Out: 1865 [Urine:1500; Drains:365]    Physical Exam  Constitutional:       General: She is not in acute distress.     Appearance: Normal appearance. She is obese. She is not ill-appearing.   HENT:      Head: Normocephalic and atraumatic.   Eyes:      General: No scleral icterus.  Cardiovascular:      Rate and Rhythm: Normal rate.      Pulses: Normal pulses.   Pulmonary:      Effort: Pulmonary effort is normal.   Abdominal:      General: There is distension.      Tenderness: There is abdominal tenderness. There is no guarding.   Musculoskeletal:      Right lower leg: Edema present.      Left lower leg: Edema present.   Skin:     General: Skin is warm and dry.      Coloration: Skin is not jaundiced.   Neurological:      Mental Status: She is alert and oriented to person, place, and time.       Significant Labs:  All labs within the past 24 hours have been reviewed.     Significant Imaging:  Labs: Reviewed

## 2023-02-22 NOTE — SUBJECTIVE & OBJECTIVE
Interval History: No events overnight.  Received albumin per Nephrology recs yesterday.  UOP documented 1500mL for last 24hrs.  Soft BP this morning but slightly improved. Per discussion with Nephrology, will continue albumin infusions at this time.  Patient reports some nausea overnight associated with constipation; has enema in place and responding well.  Abdominal distension stable to slightly enlarged.    Review of Systems   Constitutional:  Positive for appetite change (so-so). Negative for chills and fever.   HENT:  Negative for congestion, sore throat and trouble swallowing.    Eyes:  Negative for visual disturbance.   Respiratory:  Negative for cough, chest tightness and shortness of breath.    Cardiovascular:  Positive for leg swelling. Negative for chest pain.   Gastrointestinal:  Positive for abdominal distention. Negative for abdominal pain, constipation, diarrhea and nausea.   Genitourinary:  Negative for dysuria.   Musculoskeletal:  Positive for arthralgias.   Skin:  Negative for rash.   Neurological:  Negative for dizziness, weakness and light-headedness.   Psychiatric/Behavioral:  Negative for decreased concentration.    Objective:     Vital Signs (Most Recent):  Temp: 98.3 °F (36.8 °C) (02/22/23 1117)  Pulse: 104 (02/22/23 1117)  Resp: 18 (02/22/23 1209)  BP: (!) 104/51 (02/22/23 1117)  SpO2: 100 % (02/22/23 1117)   Vital Signs (24h Range):  Temp:  [97.9 °F (36.6 °C)-99.3 °F (37.4 °C)] 98.3 °F (36.8 °C)  Pulse:  [101-107] 104  Resp:  [18-19] 18  SpO2:  [96 %-100 %] 100 %  BP: ()/(51-56) 104/51     Weight: (!) 142 kg (313 lb)  Body mass index is 49.02 kg/m².    Intake/Output Summary (Last 24 hours) at 2/22/2023 1416  Last data filed at 2/22/2023 1119  Gross per 24 hour   Intake --   Output 1750 ml   Net -1750 ml        Physical Exam  Vitals and nursing note reviewed.   Constitutional:       General: She is not in acute distress.     Appearance: She is well-developed. She is obese. She is  ill-appearing. She is not toxic-appearing or diaphoretic.   HENT:      Head: Normocephalic and atraumatic.      Right Ear: External ear normal.      Left Ear: External ear normal.      Nose: Nose normal.      Mouth/Throat:      Mouth: Mucous membranes are moist.      Pharynx: Oropharynx is clear.   Eyes:      General: No scleral icterus.        Right eye: No discharge.   Cardiovascular:      Rate and Rhythm: Normal rate and regular rhythm.      Pulses: Normal pulses.      Heart sounds: Normal heart sounds.   Pulmonary:      Effort: Pulmonary effort is normal.      Breath sounds: Normal breath sounds.   Abdominal:      General: There is distension.      Palpations: Abdomen is soft.      Tenderness: There is no abdominal tenderness. There is no guarding or rebound.      Comments: 4 drains in place with serosanguinous fluid   Musculoskeletal:         General: No tenderness.      Cervical back: Neck supple.      Right lower leg: Edema present.      Left lower leg: Edema present.   Skin:     General: Skin is warm and dry.   Neurological:      Mental Status: She is alert and oriented to person, place, and time.   Psychiatric:         Behavior: Behavior normal.       Significant Labs: All pertinent labs within the past 24 hours have been reviewed.      Recent Results (from the past 24 hour(s))   Comprehensive Metabolic Panel    Collection Time: 02/22/23  6:36 AM   Result Value Ref Range    Sodium 130 (L) 136 - 145 mmol/L    Potassium 3.8 3.5 - 5.1 mmol/L    Chloride 94 (L) 95 - 110 mmol/L    CO2 30 (H) 23 - 29 mmol/L    Glucose 80 70 - 110 mg/dL    BUN 19 6 - 20 mg/dL    Creatinine 0.5 0.5 - 1.4 mg/dL    Calcium 8.5 (L) 8.7 - 10.5 mg/dL    Total Protein 5.1 (L) 6.0 - 8.4 g/dL    Albumin 2.9 (L) 3.5 - 5.2 g/dL    Total Bilirubin 1.3 (H) 0.1 - 1.0 mg/dL    Alkaline Phosphatase 116 55 - 135 U/L    AST 62 (H) 10 - 40 U/L    ALT 30 10 - 44 U/L    Anion Gap 6 (L) 8 - 16 mmol/L    eGFR >60.0 >60 mL/min/1.73 m^2   Magnesium     Collection Time: 02/22/23  6:36 AM   Result Value Ref Range    Magnesium 2.0 1.6 - 2.6 mg/dL   Phosphorus    Collection Time: 02/22/23  6:36 AM   Result Value Ref Range    Phosphorus 2.4 (L) 2.7 - 4.5 mg/dL   CBC Auto Differential    Collection Time: 02/22/23  6:36 AM   Result Value Ref Range    WBC 0.51 (LL) 3.90 - 12.70 K/uL    RBC 2.31 (L) 4.00 - 5.40 M/uL    Hemoglobin 6.7 (L) 12.0 - 16.0 g/dL    Hematocrit 21.3 (L) 37.0 - 48.5 %    MCV 92 82 - 98 fL    MCH 29.0 27.0 - 31.0 pg    MCHC 31.5 (L) 32.0 - 36.0 g/dL    RDW 17.3 (H) 11.5 - 14.5 %    Platelets 39 (LL) 150 - 450 K/uL    MPV 10.7 9.2 - 12.9 fL    Immature Granulocytes CANCELED 0.0 - 0.5 %    Immature Grans (Abs) CANCELED 0.00 - 0.04 K/uL    nRBC 0 0 /100 WBC    Gran % 65.0 38.0 - 73.0 %    Lymph % 5.0 (L) 18.0 - 48.0 %    Mono % 20.0 (H) 4.0 - 15.0 %    Eosinophil % 5.0 0.0 - 8.0 %    Basophil % 5.0 (H) 0.0 - 1.9 %    Platelet Estimate Decreased (A)     Aniso Slight     Poik Slight     Hypo Occasional     Ovalocytes Occasional     Tear Drop Cells Occasional     Spherocytes Occasional     Differential Method Manual    Protime-INR    Collection Time: 02/22/23  6:36 AM   Result Value Ref Range    Prothrombin Time 14.0 (H) 9.0 - 12.5 sec    INR 1.4 (H) 0.8 - 1.2       Significant Imaging: I have reviewed all pertinent imaging results/findings within the past 24 hours.

## 2023-02-22 NOTE — ASSESSMENT & PLAN NOTE
-multifactorial in the setting of narcotics, liver failure, recurrent chylous ascites, and intermittent diuresis  - given bolus 500 x1 2/19, lactic acid negative  -continues to remain soft, though improving on albumin  - Will continue to monitor blood pressure trend closely and adjust medication regimen as clinically indicated and tolerated

## 2023-02-22 NOTE — PT/OT/SLP PROGRESS
Occupational Therapy      Patient Name:  Rachel Zazueta   MRN:  [id number]    Patient not seen today secondary to Nursing care/ PCT care uon OT attempt. OT unable to make second attempt. Will follow-up as appropriate.    2/22/2023

## 2023-02-22 NOTE — ASSESSMENT & PLAN NOTE
42 y.o F w/ hx of IV drug use (meth), chronic HCV with cirrhosis, spinal fusion (04/2021), epidural abscess with removal of hardware (02/2022), spinal fusion with hardware (T10 - Pelvis / 03/2022), and neurogenic bladder here with complicated ESBL E.coli epidural abscess s/p washout,corpectomy, fixation being treated w/ meropenem.     Nephrology consulted for hyponatremia. Patient reports uncomfortable abdominal distension and leg swelling. Reports drinking 3-4 cups of her 30oz water tumbler daily. Urine Na <10, urine osm 513. Serum osm 283. Serum Na stable. Normal mentation.    Hyponatremia likely 2/2 to low effective circulating volume 2/2 cirrhosis. Possible poor solute intake relative to free water intake. Unlikely SIADH given low urine Na.   Repeat urine studies 2/14 suggestive of appropriate ADH in setting of cirrhosis    Continue albumin infusions 100 cc 25% q8  Short of increasing her circulatory pressures via pressors, her low effective circulating effective volume driven by cirrhosis is driving her hyponatremia. Unsure of overall goal even if sodium were corrected with pressors as her hyponatremia would return after pressors were weaned off.

## 2023-02-22 NOTE — ASSESSMENT & PLAN NOTE
- Patient with sodium   Recent Labs   Lab 02/20/23  0351 02/21/23  0436 02/22/23  0636   * 127* 130*   . sodium trending down    2/9 monitor on lasix BID.sodium trended down to 127.   2/10 sodium stable at 127 . continues with increased drain output 985ml/24h . negative balance of  2.8 L lasix on hold.  nephrology consulted.  Nephrology recs  fluid restriction to 1 L,  lasix 60 mg IV daily,  and trend sodium q12h.  diazepam discontinued   2/11  noncompliant with  fluid restriction to 1 L despite counseling. sodium trended down to 126 .received 2 doses of IV lasix 60mg. Increased Lasix IV 80 BID  due to UOP 500ml/24h   2/12 UOP of 1250ml/24h.sodium trended up to 129   2/14 sodium trended up to 130 . Diuresing well with lasix IV .   2/15 started on salt tablets 1g BID and lasix drip 10mg/h x 6h  2/16 continue Lasix 40 IVP x 1 with Lasix drip 10mg/hr x 6   2/17 sodium 134  2/18 sodium 131. On lasix drip . Increased from 5 to 10  2/19 sodium 130, lasix stopped   2/20 Bumex initiated per Nephrology recs  2/21, 2/22 diuresis held and albumin initiated

## 2023-02-22 NOTE — ASSESSMENT & PLAN NOTE
MELD-Na score: 11 at 2/22/2023  6:36 AM  MELD score: 11 at 2/22/2023  6:36 AM  Calculated from:  Serum Creatinine: 0.5 mg/dL (Using min of 1 mg/dL) at 2/22/2023  6:36 AM  Serum Sodium: 130 mmol/L at 2/22/2023  6:36 AM  Total Bilirubin: 1.3 mg/dL at 2/22/2023  6:36 AM  INR(ratio): 1.4 at 2/22/2023  6:36 AM  Age: 42 years      Patient has reportedly refused transplant evaluation in the past.   - Daily CMP and trend LFTs  - Continue Lactulose 20 g TID  - Lasix 40 mg PO BID  - Will need Hepatology follow up outpatient  2/10   ammonia at 57 . sono abdomen  . tylenol changed to 1000mg q12h with cirrhosis . s/p IR paracentesis today   2/11no evidence of SBP on paracentesis. 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 AFB and Triglyerides on ascitic fluid.    2/13 s/p paracentesis. Removed 4,700 ml. DVT sonogram LE negative.2/13 s/p paracentesis. Removed 4,700 ml. DVT sonogram LE negative. s/p psychiatry eval  for anxiety/depression. xanax discontinued. Decreased Bupropion to 150 mg PO Daily . Started Duloxetine 30 mg PO BID and  PRN Hydroxyzine 25 mg PO q8h for anxiety

## 2023-02-22 NOTE — PROGRESS NOTES
[facility] - Tele[facility] Medicine  Progress Note    Patient Name: Rachel Zazueta  MRN: [id number]  Patient Class: IP- Inpatient   Admission Date: 1/19/2023  Length of Stay: 34 days  Attending Physician: Stephanie Nelson MD  Primary Care Provider: Dannielle Merino DO        Subjective:     Principal Problem:Weakness of both lower extremities        HPI:  Ms. Zazueta is a 42 y.o F w/ hx ofIV drug use (methamphetamine), chronic HCV with cirrhosis, spinal fusion (04/2021), epidural abscess with removal of hardware (02/2022), spinal fusion with hardware (T10 - Pelvis / 03/2022), obesity (BMI 39.3) and neurogenic bladder who initially presented to [facility] for evaluation of back pain and left leg weakness.  She reports initially noting the left leg weakness when she was about to go to the bathroom and was about to fall but was assisted by her boyfriend.  She was brought to the ED via EMS.  Urinalysis with UTI concerns and blood cultures at OSH grew ESBL E coli so she was treated with meropenem.  During hospitalization, she reports the weakness that started out in her left leg initially then spread upwards to her left thigh, left hip, then crossed over to the right hip and spread to her right leg.  Denies recent IV drug use. She reports her last incidence of drug use was more than 3 years ago and also denies tobacco, and alcohol abuse.  Reports cannabis use.    She also reports more than 10 years ago she had an episode of a headache that lasted about 4 days and was associated with residual defects of a strabismus in the left eye with associated visual disturbances.  She also reports over the past few years her friends have noticed that she sometimes has word-finding difficulty during conversations.     Reports shortness of breath when sitting up, fever, chills, back pain, lower extremity weakness(initally L>R, but now R>L), diarrhea.  Denies cough, nausea, vomiting, constipation, bladder or bowel  incontinence, dysuria, dark urine, new vision changes.    OSH course notable for concerning urinalysis and blood cultures positive for ESBL E coli treated with meropenem.  She was also admit to the ICU where she was treated with Precedex for significant body aches, irritability, and muscle spasms.  Concerns of a murmur on physical exam so she underwent TTE which did not show any vegetations.  Worsening lower extremity weakness workup with MRI head nonspecific, MRI cervical spine with multilevel spondylosis, X-ray spine with multilevel thoracolumbar fusion and diskectomy, focal kyphosis at T9-10 increased compared to prior.  She was started on prophylaxis amoxicillin due to her history of infected hardware.  MRI spine unable to be completed due to patient's body habitus so she was transferred to [facility] for further imaging and Neurosurgery, Neurology evaluation.      Overview/Hospital Course:  Pt admitted as a transfer from [facility] for worsening LE weakness, concern for spinal infection, and need for MRI which could not be done at OSH. Imaging was completed here. Worsening proximal junctional kyphosis noted at T9-10 noted with concern for discitis at T8-9, T9-10. NSGY evaluated.     Found to have T8-T10 kyphosis on MRI. 1/24 underwent laminectomy T8-T10; posterior fusion T8-T12; and washout. 2/2 underwent T4-pelvis fusion and T9-T10 corpectomy.  ID consulted for thoracic discitis infection.  Patient to complete 8 weeks of therapy with meropenem. Patient underwent flap closure with Plastic surgery.  Step-down Hospital Medicine on 02/08.    2/9   On meropenem for MDR-ESCHERICHIA COLI ESBL  sodium trended down to 129.  Neurosurgery following post OR and aiding in pain management. PCA discontinued 2/8 . On oxycodone 10mg PO q4h . requiring IV dilaudid q4 as needed,. drains to gravity output 990ml/24h . Plastic surgery following flap, managing wounds.  Wound VAC discontinued on Wednesday.  accepted to [facility] pain  management consulted for back ache- switched to multimodal therapy. sodium trended down to 127.   2/10 sodium stable at 127 . continues with increased drain output 985ml/24h . negative balance of  2.8 L lasix on hold.  nephrology consulted. abdominal X ray -  Nonobstructive bowel gas pattern.  No free air.  As before, mild gaseous distension of stomach. ammonia at 57 . sono abdomen  . tylenol changed to 1000mg q12h with cirrhosis . sono abdomen -Cirrhotic liver morphology.  Sequela of portal hypertension including moderate ascites, recanalized umbilical vein.  No focal hepatic lesions. Cholecystectomy. . Nephrology recs  fluid restriction to 1 L,  lasix 60 mg IV daily,  and trend sodium q12h.  diazepam discontinued . s/p IR paracentesis today   2/11no evidence of SBP on paracentesis. 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 AFB and Triglyerides on ascitic fluid.  noncompliant with  fluid restriction to 1 L despite counseling. sodium trended down to 126 .received 2 doses of IV lasix 60mg. drain output 250ml. Increased Lasix IV 80 BID  due to UOP 500ml/24h   2/12 UOP of 1250ml/24h. K and P replaced. sodium trended up to 129.  hepatology eval -If ascitic fluid triglyceride level is elevated, needs IR  eval for lymphangiogram and surgery eval for  lymphatic repair. xanax 0.25mg BID PRN for anxiety   2/13 s/p paracentesis. Removed 4,700 ml. DVT sonogram LE negative. s/p psychiatry eval  for anxiety/depression. xanax discontinued. Decreased Bupropion to 150 mg PO Daily . Started Duloxetine 30 mg PO BID and  PRN Hydroxyzine 25 mg PO q8h for anxiety  2/14 Hb 6.9 Transfusion with 1 unit of PRBC . FOBT. sodium trended up to 130 . Diuresing well with lasix IV . DVT sonogram - Nonspecific fluid collection adjacent to the left iliac vein.  Recommend further evaluation with contrast enhanced CT scan . has Iodine contrast allergy- . 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  Diuresing well on IV lasix BID. negative balance of  13.6 L . IR eval -repeat paracentesis to trend volume output and  conservative management   with a low fat diet, octreotide, diuretics. Neurosurgery for input regarding chylous ascitis as possible neurosurgical complication or not. CT abdomen done overnight as well - Postop change in the spine.  There is lucency about the bi iliac screws concerning for loosening.  Bony destruction T10 with interbody strut. started on salt tablets 1g BID x 1day and lasix drip 10mg/h x 6h .  spironolactone  increased to 100 mg Qday. Continue IV Lasix. Dietary consulted for  high-protein and low-fat diet with medium-chain triglycerides  diet.  paracentesis 6.6L removed   2/16 Continuing gentle IV lasix with assistance from nephrology, remains fluid overloaded  2/17: continue Lasix infusion, responding well  2/18-2/19:  Please see progress notes from those dates.    2/20: D/w nephrology to transition from IV Lasix to IV bumex for further diuresis, though Na likely optimized as much as possible. D/w NSGY re xray prior to d/c ( sitting upright in the stretcher is fine.) D/w hepatology: IR wanting to defer lymphangiogram at this time.   2/21:  Discussed with plastic surgery; goal for each drain 30 cc or less daily  2/22:  Discussed with Nephrology, will continue albumin infusions at this time.  However, long-term goals unclear as unable to sustain with albumin or even pressor support if stepped up to ICU.  Discussed in depth with Nephrology and hepatology.  Per hepatology, Discussed with Dr Santiago. Not a tx candidate. Please consult Palliative.      Interval History: No events overnight.  Received albumin per Nephrology recs yesterday.  UOP documented 1500mL for last 24hrs.  Soft BP this morning but slightly improved. Per  discussion with Nephrology, will continue albumin infusions at this time.  Patient reports some nausea overnight associated with constipation; has enema in place and responding well.  Abdominal distension stable to slightly enlarged.    Review of Systems   Constitutional:  Positive for appetite change (so-so). Negative for chills and fever.   HENT:  Negative for congestion, sore throat and trouble swallowing.    Eyes:  Negative for visual disturbance.   Respiratory:  Negative for cough, chest tightness and shortness of breath.    Cardiovascular:  Positive for leg swelling. Negative for chest pain.   Gastrointestinal:  Positive for abdominal distention. Negative for abdominal pain, constipation, diarrhea and nausea.   Genitourinary:  Negative for dysuria.   Musculoskeletal:  Positive for arthralgias.   Skin:  Negative for rash.   Neurological:  Negative for dizziness, weakness and light-headedness.   Psychiatric/Behavioral:  Negative for decreased concentration.    Objective:     Vital Signs (Most Recent):  Temp: 98.3 °F (36.8 °C) (02/22/23 1117)  Pulse: 104 (02/22/23 1117)  Resp: 18 (02/22/23 1209)  BP: (!) 104/51 (02/22/23 1117)  SpO2: 100 % (02/22/23 1117)   Vital Signs (24h Range):  Temp:  [97.9 °F (36.6 °C)-99.3 °F (37.4 °C)] 98.3 °F (36.8 °C)  Pulse:  [101-107] 104  Resp:  [18-19] 18  SpO2:  [96 %-100 %] 100 %  BP: ()/(51-56) 104/51     Weight: (!) 142 kg (313 lb)  Body mass index is 49.02 kg/m².    Intake/Output Summary (Last 24 hours) at 2/22/2023 1416  Last data filed at 2/22/2023 1119  Gross per 24 hour   Intake --   Output 1750 ml   Net -1750 ml        Physical Exam  Vitals and nursing note reviewed.   Constitutional:       General: She is not in acute distress.     Appearance: She is well-developed. She is obese. She is ill-appearing. She is not toxic-appearing or diaphoretic.   HENT:      Head: Normocephalic and atraumatic.      Right Ear: External ear normal.      Left Ear: External ear normal.       Nose: Nose normal.      Mouth/Throat:      Mouth: Mucous membranes are moist.      Pharynx: Oropharynx is clear.   Eyes:      General: No scleral icterus.        Right eye: No discharge.   Cardiovascular:      Rate and Rhythm: Normal rate and regular rhythm.      Pulses: Normal pulses.      Heart sounds: Normal heart sounds.   Pulmonary:      Effort: Pulmonary effort is normal.      Breath sounds: Normal breath sounds.   Abdominal:      General: There is distension.      Palpations: Abdomen is soft.      Tenderness: There is no abdominal tenderness. There is no guarding or rebound.      Comments: 4 drains in place with serosanguinous fluid   Musculoskeletal:         General: No tenderness.      Cervical back: Neck supple.      Right lower leg: Edema present.      Left lower leg: Edema present.   Skin:     General: Skin is warm and dry.   Neurological:      Mental Status: She is alert and oriented to person, place, and time.   Psychiatric:         Behavior: Behavior normal.       Significant Labs: All pertinent labs within the past 24 hours have been reviewed.      Recent Results (from the past 24 hour(s))   Comprehensive Metabolic Panel    Collection Time: 02/22/23  6:36 AM   Result Value Ref Range    Sodium 130 (L) 136 - 145 mmol/L    Potassium 3.8 3.5 - 5.1 mmol/L    Chloride 94 (L) 95 - 110 mmol/L    CO2 30 (H) 23 - 29 mmol/L    Glucose 80 70 - 110 mg/dL    BUN 19 6 - 20 mg/dL    Creatinine 0.5 0.5 - 1.4 mg/dL    Calcium 8.5 (L) 8.7 - 10.5 mg/dL    Total Protein 5.1 (L) 6.0 - 8.4 g/dL    Albumin 2.9 (L) 3.5 - 5.2 g/dL    Total Bilirubin 1.3 (H) 0.1 - 1.0 mg/dL    Alkaline Phosphatase 116 55 - 135 U/L    AST 62 (H) 10 - 40 U/L    ALT 30 10 - 44 U/L    Anion Gap 6 (L) 8 - 16 mmol/L    eGFR >60.0 >60 mL/min/1.73 m^2   Magnesium    Collection Time: 02/22/23  6:36 AM   Result Value Ref Range    Magnesium 2.0 1.6 - 2.6 mg/dL   Phosphorus    Collection Time: 02/22/23  6:36 AM   Result Value Ref Range    Phosphorus 2.4  (L) 2.7 - 4.5 mg/dL   CBC Auto Differential    Collection Time: 02/22/23  6:36 AM   Result Value Ref Range    WBC 0.51 (LL) 3.90 - 12.70 K/uL    RBC 2.31 (L) 4.00 - 5.40 M/uL    Hemoglobin 6.7 (L) 12.0 - 16.0 g/dL    Hematocrit 21.3 (L) 37.0 - 48.5 %    MCV 92 82 - 98 fL    MCH 29.0 27.0 - 31.0 pg    MCHC 31.5 (L) 32.0 - 36.0 g/dL    RDW 17.3 (H) 11.5 - 14.5 %    Platelets 39 (LL) 150 - 450 K/uL    MPV 10.7 9.2 - 12.9 fL    Immature Granulocytes CANCELED 0.0 - 0.5 %    Immature Grans (Abs) CANCELED 0.00 - 0.04 K/uL    nRBC 0 0 /100 WBC    Gran % 65.0 38.0 - 73.0 %    Lymph % 5.0 (L) 18.0 - 48.0 %    Mono % 20.0 (H) 4.0 - 15.0 %    Eosinophil % 5.0 0.0 - 8.0 %    Basophil % 5.0 (H) 0.0 - 1.9 %    Platelet Estimate Decreased (A)     Aniso Slight     Poik Slight     Hypo Occasional     Ovalocytes Occasional     Tear Drop Cells Occasional     Spherocytes Occasional     Differential Method Manual    Protime-INR    Collection Time: 02/22/23  6:36 AM   Result Value Ref Range    Prothrombin Time 14.0 (H) 9.0 - 12.5 sec    INR 1.4 (H) 0.8 - 1.2       Significant Imaging: I have reviewed all pertinent imaging results/findings within the past 24 hours.            Assessment/Plan:      * Weakness of both lower extremities  42 year old woman s/p multiple spinal surgeries presented to OSH with possible infection of shoulder. Found to have UTI and E. Coli bacteremia and progressively worse BLE weakness. Transferred to [facility] for neurosurgical evaluation. Underwent Laminectomy T8-T10; Posterior spinal fusion T8-T12; hardware removal and washout on 1/24. Admitted to [facility] postop. On 2/2 underwent T4-pelvis fusion and T9-T10 corpectomies. To complete 8 week course of meropenem per ID for ESBL E coli from bone culture, end date 3/29.     - neuro checks q4h  - meropenem for ESBL bacteremia per ID, eot 3/29/23  - Plastic surgery following, wound vac discontinued  - CMP, Mag, Phos, CBC daily  - MAP goals >65, SBP < 180  - PRN labetalol,  hydralazine  - MM pain regimen: PRN Dilaudid, Tylenol, gabapentin, robaxin, PRN oxycodone  - Aggressive bowel regimen  - PT/OT     Hypotension  -multifactorial in the setting of narcotics, liver failure, recurrent chylous ascites, and intermittent diuresis  - given bolus 500 x1 2/19, lactic acid negative  -continues to remain soft, though improving on albumin  - Will continue to monitor blood pressure trend closely and adjust medication regimen as clinically indicated and tolerated      E coli infection  See weakness of both lower extremities   Meropenem for ESBL bacteremia per ID until 3/29      Thoracic discitis  See Weakness of both lower extremities    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  IR consulted  for lymphangiogram and  eval for  lymphatic repair  2/15  IR eval -repeat paracentesis to trend volume output and  conservative management    spironolactone  increased to 100 mg Qday. Continue IV Lasix. Dietary consulted for  high-protein and low-fat diet with medium-chain triglycerides  diet.     Will continue to monitor for further paracentesis need.  IR deferring lymphangiogram at this time.    Ascites due to alcoholic cirrhosis  2/10  sono abdomen -Cirrhotic liver morphology.  Sequela of portal hypertension including moderate ascites, recanalized umbilical vein.  No focal hepatic lesions. Cholecystectomy. s/p IR paracentesis as above  2/11no evidence of SBP on paracentesis. 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 AFB and Triglyerides on ascitic fluid.    2/12  hepatology eval -If ascitic fluid triglyceride level is elevated, needs IR  eval for lymphangiogram and surgery eval for  lymphatic repair    Discussed with hepatology, not a liver transplant candidate.  IR deferring lymph angiogram at this time.    Chronic hepatitis C with cirrhosis  See above    Cirrhosis of liver with ascites  MELD-Na  score: 11 at 2/22/2023  6:36 AM  MELD score: 11 at 2/22/2023  6:36 AM  Calculated from:  Serum Creatinine: 0.5 mg/dL (Using min of 1 mg/dL) at 2/22/2023  6:36 AM  Serum Sodium: 130 mmol/L at 2/22/2023  6:36 AM  Total Bilirubin: 1.3 mg/dL at 2/22/2023  6:36 AM  INR(ratio): 1.4 at 2/22/2023  6:36 AM  Age: 42 years      Patient has reportedly refused transplant evaluation in the past.   - Daily CMP and trend LFTs  - Continue Lactulose 20 g TID  - Lasix 40 mg PO BID  - Will need Hepatology follow up outpatient  2/10   ammonia at 57 . sono abdomen  . tylenol changed to 1000mg q12h with cirrhosis . s/p IR paracentesis today   2/11no evidence of SBP on paracentesis. 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 AFB and Triglyerides on ascitic fluid.    2/13 s/p paracentesis. Removed 4,700 ml. DVT sonogram LE negative.2/13 s/p paracentesis. Removed 4,700 ml. DVT sonogram LE negative. s/p psychiatry eval  for anxiety/depression. xanax discontinued. Decreased Bupropion to 150 mg PO Daily . Started Duloxetine 30 mg PO BID and  PRN Hydroxyzine 25 mg PO q8h for anxiety    Hyponatremia  - Patient with sodium   Recent Labs   Lab 02/20/23  0351 02/21/23  0436 02/22/23  0636   * 127* 130*   . sodium trending down    2/9 monitor on lasix BID.sodium trended down to 127.   2/10 sodium stable at 127 . continues with increased drain output 985ml/24h . negative balance of  2.8 L lasix on hold.  nephrology consulted.  Nephrology recs  fluid restriction to 1 L,  lasix 60 mg IV daily,  and trend sodium q12h.  diazepam discontinued   2/11  noncompliant with  fluid restriction to 1 L despite counseling. sodium trended down to 126 .received 2 doses of IV lasix 60mg. Increased Lasix IV 80 BID  due to UOP 500ml/24h   2/12 UOP of 1250ml/24h.sodium trended up to 129   2/14 sodium trended up to 130 . Diuresing well with lasix IV .   2/15 started on salt tablets 1g BID and lasix drip 10mg/h x  6h  2/16 continue Lasix 40 IVP x 1 with Lasix drip 10mg/hr x 6   2/17 sodium 134  2/18 sodium 131. On lasix drip . Increased from 5 to 10  2/19 sodium 130, lasix stopped   2/20 Bumex initiated per Nephrology recs  2/21, 2/22 diuresis held and albumin initiated    Acute blood loss anemia  Hb 6.4 on 2/6, received 1U pRBCs. Hb 2/7 WNL.  - monitor Hb  - transfuse to maintain Hb >7  2/9    Patient's with Normocytic anemia.. Hemoglobin stable. Etiology likely due to chronic disease .  Current CBC reviewed-    Recent Labs   Lab 02/19/23  1255 02/20/23  0353 02/21/23  0436   HGB 8.0* 8.3* 8.6*    Monitor CBC and transfuse if H/H drops below 7/21.   2/14 Hb 6.9 Transfusion with 1 unit of PRBC . FOBT negative  2/17: hgb 8.1, stable  2/18: hbg 8.2 stable   2/19 stable , hbg 8    Abdominal pain  2/10  abdominal X ray -  Nonobstructive bowel gas pattern.  No free air.  As before, mild gaseous distension of stomach. ammonia at 57 . sono abdomen with ascitis . simethicone PRN  2/11no evidence of SBP on paracentesis. 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 AFB and Triglyerides on ascitic fluid.      Kyphosis of thoracolumbar region  See weakness    Anxiety and depression  Evaluated by Psychiatry at OSH prior to transfer. Recommended increasing bupropion.  - Continue bupropion 300 mg QD  - Gabapentin 600 mg TID for anxiety and neuropathy  - PRN diazepam 5 mg q6h discontinued  2/13   s/p psychiatry eval  for anxiety/depression. xanax discontinued. Decreased Bupropion to 150 mg PO Daily . Started Duloxetine 30 mg PO BID and  PRN Hydroxyzine 25 mg PO q8h for anxiety    Severe obesity (BMI >= 40)  Body mass index is 48.58 kg/m². Morbid obesity complicates all aspects of disease management from diagnostic modalities to treatment. Weight loss encouraged    Substance use disorder  Denies IV drug use (meth) for past 3 years. Denies alcohol and tobacco abuse.     Pancytopenia  -  acute on chronic  - follows with hematology outpatient  - CBC daily      VTE Risk Mitigation (From admission, onward)         Ordered     heparin (porcine) injection 5,000 Units  Every 8 hours         02/04/23 0848     IP VTE HIGH RISK PATIENT  Once         01/19/23 2153     Place sequential compression device  Until discontinued         01/19/23 2153     Reason for No Pharmacological VTE Prophylaxis  Once        Question:  Reasons:  Answer:  Physician Provided (leave comment)  Comment:  Potential NSGY procedure    01/19/23 2153                Discharge Planning   SHIRA: 2/27/2023     Code Status: Partial Code   Is the patient medically ready for discharge?: No    Reason for patient still in hospital (select all that apply): Patient unstable, Patient trending condition, Consult recommendations and Pending disposition  Discharge Plan A: Long-term acute care facility (LTAC)   Discharge Delays: None known at this time              Stephanie Nelson MD  Department of Hospital Medicine   [facility] - Telemetry Stepdown

## 2023-02-22 NOTE — PROGRESS NOTES
"[facility] - Telemetry Stepdown  Nephrology  Progress Note    Patient Name: Rachel Zazueta  MRN: [id number]  Admission Date: 1/19/2023  Hospital Length of Stay: 34 days  Attending Provider: Stephanie Nelson MD   Primary Care Physician: Dannielle Merino DO  Principal Problem:Weakness of both lower extremities    Subjective:     HPI: Per HM note: "42 y.o F w/ hx ofIV drug use (methamphetamine), chronic HCV with cirrhosis, spinal fusion (04/2021), epidural abscess with removal of hardware (02/2022), spinal fusion with hardware (T10 - Pelvis / 03/2022), obesity (BMI 39.3) and neurogenic bladder and recent shoulder surgery who initially presented to [facility] for evaluation of back pain and left leg weakness. She reports initially noting the left leg weakness when she was about to go to the bathroom and was about to fall but was assisted by her boyfriend.  She was brought to the ED via EMS. Urinalysis with UTI concerns and blood cultures at OSH grew ESBL E coli, and shoulder effusion concern for infection so she was treated with meropenem.  During hospitalization, she reports the weakness that started out in her left leg initially then spread upwards to her left thigh, left hip, then crossed over to the right hip and spread to her right leg.  Denies recent IV drug use. She reports her last incidence of drug use was more than 3 years ago and also denies tobacco, and alcohol abuse.  Reports cannabis use.     OSH course notable for concerning urinalysis and blood cultures positive for ESBL E coli treated with meropenem.  She was also admit to the ICU where she was treated with Precedex for significant body aches, irritability, and muscle spasms.  Concerns of a murmur on physical exam so she underwent TTE which did not show any vegetations.  Worsening lower extremity weakness workup with MRI head nonspecific, MRI cervical spine with multilevel spondylosis, X-ray spine with multilevel thoracolumbar fusion and " "diskectomy, focal kyphosis at T9-10 increased compared to prior.  She was started on prophylaxis amoxicillin due to her history of infected hardware.  MRI spine unable to be completed due to patient's body habitus so she was transferred to [facility] for further imaging and Neurosurgery, Neurology evaluation." Underwent laminectomy, posterior fusion and washout on 1/24. ID following for discitis, has her on meropenem. Stepped down to  on 2/8.     Nephrology consulted for hyponatremia. Patient reports uncomfortable abdominal distension and leg swelling. Reports drinking 3-4 cups of her 30oz water tumbler daily. Urine Na <10, urine osm 513. Serum Na trend 136--> 132-->131-->129-->127-->135-->134-->127. Normal mentation. Cr/BUN wnl. Albumin 1.8, protein 4.5, bili 1.4.          Interval History: serum sodium stable. Remains on albumin infusions    Review of patient's allergies indicates:   Allergen Reactions    Bee venom protein (honey bee) Anaphylaxis     Patient reports she is allergic to bee stings.    Naproxen Anaphylaxis     Throat closing    12-23- Patient reports taking Ibuprofen 200 mg at home without problems. Verified X3. [place]    Wasp sting [allergen ext-venom-honey bee] Anaphylaxis    Adhesive Blisters    Shellfish containing products     Iodine and iodide containing products Hives and Itching     Allergic to iodine in seafood only    Nuts [tree nut] Rash     Current Facility-Administered Medications   Medication Frequency    acetaminophen tablet 1,000 mg Q12H    bisacodyL suppository 10 mg Every other day    buPROPion TB24 tablet 150 mg Daily    dextrose 10% bolus 125 mL 125 mL PRN    dextrose 10% bolus 250 mL 250 mL PRN    diphenhydrAMINE capsule 25 mg On Call Procedure    DULoxetine DR capsule 30 mg BID    gabapentin capsule 900 mg TID    glucagon (human recombinant) injection 1 mg PRN    glucose chewable tablet 16 g PRN    glucose chewable tablet 24 g PRN    heparin (porcine) injection 5,000 " Units Q8H    hydrALAZINE tablet 25 mg Q8H PRN    HYDROmorphone injection 0.5 mg Q4H PRN    hydrOXYzine HCL tablet 25 mg TID PRN    lactulose 20 gram/30 mL solution Soln 20 g TID    melatonin tablet 6 mg Nightly PRN    meropenem (MERREM) 2 g in sodium chloride 0.9% 100 mL IVPB Q8H    methocarbamoL tablet 1,000 mg Q6H    naloxone 0.4 mg/mL injection 0.02 mg PRN    ondansetron injection 4 mg Q6H PRN    oxyCODONE immediate release tablet 5 mg Q3H PRN    And    oxyCODONE immediate release tablet Tab 10 mg Q3H PRN    pantoprazole EC tablet 40 mg Daily    polyethylene glycol packet 17 g Daily PRN    predniSONE tablet 50 mg On Call Procedure    prochlorperazine injection Soln 2.5 mg Q6H PRN    senna-docusate 8.6-50 mg per tablet 1 tablet BID PRN    simethicone chewable tablet 80 mg TID PRN    sodium chloride 0.9% flush 10 mL Q6H    And    sodium chloride 0.9% flush 10 mL PRN       Objective:     Vital Signs (Most Recent):  Temp: 98.3 °F (36.8 °C) (02/22/23 1117)  Pulse: 104 (02/22/23 1117)  Resp: 18 (02/22/23 1209)  BP: (!) 104/51 (02/22/23 1117)  SpO2: 100 % (02/22/23 1117)   Vital Signs (24h Range):  Temp:  [97.9 °F (36.6 °C)-99.3 °F (37.4 °C)] 98.3 °F (36.8 °C)  Pulse:  [101-107] 104  Resp:  [18-19] 18  SpO2:  [96 %-100 %] 100 %  BP: ()/(51-56) 104/51     Weight: (!) 142 kg (313 lb) (02/20/23 0556)  Body mass index is 49.02 kg/m².  Body surface area is 2.59 meters squared.    I/O last 3 completed shifts:  In: 450 [P.O.:450]  Out: 1865 [Urine:1500; Drains:365]    Physical Exam  Constitutional:       General: She is not in acute distress.     Appearance: Normal appearance. She is obese. She is not ill-appearing.   HENT:      Head: Normocephalic and atraumatic.   Eyes:      General: No scleral icterus.  Cardiovascular:      Rate and Rhythm: Normal rate.      Pulses: Normal pulses.   Pulmonary:      Effort: Pulmonary effort is normal.   Abdominal:      General: There is distension.      Tenderness:  There is abdominal tenderness. There is no guarding.   Musculoskeletal:      Right lower leg: Edema present.      Left lower leg: Edema present.   Skin:     General: Skin is warm and dry.      Coloration: Skin is not jaundiced.   Neurological:      Mental Status: She is alert and oriented to person, place, and time.       Significant Labs:  All labs within the past 24 hours have been reviewed.     Significant Imaging:  Labs: Reviewed    Assessment/Plan:     Renal/  Hyponatremia  42 y.o F w/ hx of IV drug use (meth), chronic HCV with cirrhosis, spinal fusion (04/2021), epidural abscess with removal of hardware (02/2022), spinal fusion with hardware (T10 - Pelvis / 03/2022), and neurogenic bladder here with complicated ESBL E.coli epidural abscess s/p washout,corpectomy, fixation being treated w/ meropenem.     Nephrology consulted for hyponatremia. Patient reports uncomfortable abdominal distension and leg swelling. Reports drinking 3-4 cups of her 30oz water tumbler daily. Urine Na <10, urine osm 513. Serum osm 283. Serum Na stable. Normal mentation.    Hyponatremia likely 2/2 to low effective circulating volume 2/2 cirrhosis. Possible poor solute intake relative to free water intake. Unlikely SIADH given low urine Na.   Repeat urine studies 2/14 suggestive of appropriate ADH in setting of cirrhosis    Continue albumin infusions 100 cc 25% q8  Short of increasing her circulatory pressures via pressors, her low effective circulating effective volume driven by cirrhosis is driving her hyponatremia. Unsure of overall goal even if sodium were corrected with pressors as her hyponatremia would return after pressors were weaned off.     GI  Chronic hepatitis C with cirrhosis  Will need to f/u with outpatient hepatology for antiviral treatment    Cirrhosis of liver with ascites  Would minimize risk of acute liver injury by restricting tylenol use, however will defer to primary        Thank you for your consult. I will  follow-up with patient. Please contact us if you have any additional questions.    Spencer Khan MD  Nephrology  [facility] - Telemetry Stepdown

## 2023-02-22 NOTE — PROGRESS NOTES
Plastic and Reconstructive Surgery   Progress Note    Subjective:    Patient seen and examined at bedside. No acute events overnight. Drain outputs are decreasing. Patient reports more pain today.      Objective:  Vital signs in last 24 hours:  Temp:  [97.9 °F (36.6 °C)-99.3 °F (37.4 °C)] 98 °F (36.7 °C)  Pulse:  [101-107] 107  Resp:  [18-19] 19  SpO2:  [96 %-100 %] 98 %  BP: ()/(42-56) 107/56    Intake/Output last 3 shifts:  I/O last 3 completed shifts:  In: 450 [P.O.:450]  Out: 1865 [Urine:1500; Drains:365]    Intake/Output this shift:  No intake/output data recorded.        Physical Exam:  VITAL SIGNS:   Vitals:    23 2238 23 2259 23 0713 23 0757   BP:  (!) 95/55  (!) 107/56   BP Location:       Patient Position:    Lying   Pulse:  101 103 107   Resp: 18   19   Temp:  99.3 °F (37.4 °C)  98 °F (36.7 °C)   TempSrc:    Oral   SpO2:  99%  98%   Weight:       Height:         TMAX: Temp (24hrs), Av.2 °F (36.8 °C), Min:97.9 °F (36.6 °C), Max:99.3 °F (37.4 °C)    General: Alert; No acute distress  Cardiovascular: Regular rate   Respiratory: Normal respiratory effort. Chest rise symmetric.   Abdomen: Soft, nontender, nondistended  Extremity: Moves all extremities equally.  Neurologic: No focal deficit. Speech normal  Spine incision is intact. Staples still in place.no signs of infection      Scheduled Medications acetaminophen, 1,000 mg, Q12H  bisacodyL, 10 mg, Every other day  buPROPion, 150 mg, Daily  DULoxetine, 30 mg, BID  gabapentin, 900 mg, TID  heparin (porcine), 5,000 Units, Q8H  lactulose, 20 g, TID  meropenem (MERREM) IVPB, 2 g, Q8H  methocarbamoL, 1,000 mg, Q6H  pantoprazole, 40 mg, Daily  sodium chloride 0.9%, 10 mL, Q6H      PRN Medications dextrose 10%, dextrose 10%, diphenhydrAMINE, glucagon (human recombinant), glucose, glucose, hydrALAZINE, HYDROmorphone, hydrOXYzine HCL, melatonin, naloxone, ondansetron, oxyCODONE **AND** oxyCODONE, polyethylene glycol, predniSONE,  prochlorperazine, senna-docusate 8.6-50 mg, simethicone, Flushing PICC Protocol **AND** sodium chloride 0.9% **AND** sodium chloride 0.9%    Recent Labs:   Lab Results   Component Value Date    WBC 3.52 (L) 02/21/2023    HGB 8.6 (L) 02/21/2023    HCT 28.0 (L) 02/21/2023    MCV 91 02/21/2023    PLT 94 (L) 02/21/2023     Lab Results   Component Value Date    GLU 80 02/22/2023     (L) 02/22/2023    K 3.8 02/22/2023    CL 94 (L) 02/22/2023    BUN 19 02/22/2023         Assessment: 42 y.o. y/o female 20 Days Post-Op s/p Procedure(s):  LAMINECTOMY, SPINE, THORACIC, WITH FUSION (T4-Pelvis fusion w/ T9-10 corpectomies) **AIRO Doing well postoperatively.  Drains with SSO , decreasing output    Plan  -Continue to monitor drain output  -Pain control  -possible Staple removal this week  -Rest of care per primary

## 2023-02-22 NOTE — ASSESSMENT & PLAN NOTE
2/10  sono abdomen -Cirrhotic liver morphology.  Sequela of portal hypertension including moderate ascites, recanalized umbilical vein.  No focal hepatic lesions. Cholecystectomy. s/p IR paracentesis as above  2/11no evidence of SBP on paracentesis. 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 AFB and Triglyerides on ascitic fluid.    2/12  hepatology eval -If ascitic fluid triglyceride level is elevated, needs IR  eval for lymphangiogram and surgery eval for  lymphatic repair    Discussed with hepatology, not a liver transplant candidate.  IR deferring lymph angiogram at this time.

## 2023-02-22 NOTE — CONSULTS
Palliative Consult Acknowledgement    Palliative medicine consult received and discussed with Dr. Nelson.  Patient is a 42-year-old female with a medical history of chronic HCV with cirrhosis, spinal fusion, epidural abscess with removal of hardware, obesity, neurogenic bladder who presented to [facility] for evaluation of back pain and left leg weakness. 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  Palliative was consulted for goals of care/advance care planning.  After primary team's discussion of options for care tomorrow, Palliative will see patient with plans to discuss goals of care.

## 2023-02-23 PROBLEM — A49.8 INFECTION DUE TO ESBL-PRODUCING ESCHERICHIA COLI: Status: ACTIVE | Noted: 2023-02-23

## 2023-02-23 PROBLEM — Z16.12 INFECTION DUE TO ESBL-PRODUCING ESCHERICHIA COLI: Status: RESOLVED | Noted: 2023-02-20 | Resolved: 2023-02-23

## 2023-02-23 PROBLEM — D62 ACUTE BLOOD LOSS ANEMIA: Status: RESOLVED | Noted: 2023-02-06 | Resolved: 2023-02-23

## 2023-02-23 PROBLEM — A49.8 E COLI INFECTION: Status: RESOLVED | Noted: 2023-02-20 | Resolved: 2023-02-23

## 2023-02-23 PROBLEM — I89.8 CHYLOUS ASCITES: Status: ACTIVE | Noted: 2023-02-23

## 2023-02-23 PROBLEM — Z51.5 PALLIATIVE CARE ENCOUNTER: Status: ACTIVE | Noted: 2023-02-23

## 2023-02-23 PROBLEM — Z16.12 INFECTION DUE TO ESBL-PRODUCING ESCHERICHIA COLI: Status: ACTIVE | Noted: 2023-02-20

## 2023-02-23 PROBLEM — Z16.12 INFECTION DUE TO ESBL-PRODUCING ESCHERICHIA COLI: Status: ACTIVE | Noted: 2023-02-23

## 2023-02-23 LAB
ABO + RH BLD: NORMAL
ALBUMIN SERPL BCP-MCNC: 2.7 G/DL (ref 3.5–5.2)
ALP SERPL-CCNC: 127 U/L (ref 55–135)
ALT SERPL W/O P-5'-P-CCNC: 34 U/L (ref 10–44)
ANION GAP SERPL CALC-SCNC: 7 MMOL/L (ref 8–16)
ANISOCYTOSIS BLD QL SMEAR: SLIGHT
AST SERPL-CCNC: 68 U/L (ref 10–40)
BACTERIA SPEC ANAEROBE CULT: NORMAL
BASOPHILS # BLD AUTO: ABNORMAL K/UL (ref 0–0.2)
BASOPHILS NFR BLD: 1 % (ref 0–1.9)
BILIRUB SERPL-MCNC: 0.8 MG/DL (ref 0.1–1)
BLD GP AB SCN CELLS X3 SERPL QL: NORMAL
BLD PROD TYP BPU: NORMAL
BLOOD UNIT EXPIRATION DATE: NORMAL
BLOOD UNIT TYPE CODE: 6200
BLOOD UNIT TYPE: NORMAL
BUN SERPL-MCNC: 14 MG/DL (ref 6–20)
CALCIUM SERPL-MCNC: 8.2 MG/DL (ref 8.7–10.5)
CHLORIDE SERPL-SCNC: 94 MMOL/L (ref 95–110)
CO2 SERPL-SCNC: 29 MMOL/L (ref 23–29)
CODING SYSTEM: NORMAL
CREAT SERPL-MCNC: 0.5 MG/DL (ref 0.5–1.4)
CROSSMATCH INTERPRETATION: NORMAL
DACRYOCYTES BLD QL SMEAR: ABNORMAL
DIFFERENTIAL METHOD: ABNORMAL
DISPENSE STATUS: NORMAL
EOSINOPHIL # BLD AUTO: ABNORMAL K/UL (ref 0–0.5)
EOSINOPHIL NFR BLD: 2 % (ref 0–8)
ERYTHROCYTE [DISTWIDTH] IN BLOOD BY AUTOMATED COUNT: 17.2 % (ref 11.5–14.5)
EST. GFR  (NO RACE VARIABLE): >60 ML/MIN/1.73 M^2
GLUCOSE SERPL-MCNC: 101 MG/DL (ref 70–110)
HCT VFR BLD AUTO: 21.4 % (ref 37–48.5)
HGB BLD-MCNC: 6.7 G/DL (ref 12–16)
HYPOCHROMIA BLD QL SMEAR: ABNORMAL
IMM GRANULOCYTES # BLD AUTO: ABNORMAL K/UL (ref 0–0.04)
IMM GRANULOCYTES NFR BLD AUTO: ABNORMAL % (ref 0–0.5)
INR PPP: 1.3 (ref 0.8–1.2)
LYMPHOCYTES # BLD AUTO: ABNORMAL K/UL (ref 1–4.8)
LYMPHOCYTES NFR BLD: 34 % (ref 18–48)
MAGNESIUM SERPL-MCNC: 1.9 MG/DL (ref 1.6–2.6)
MCH RBC QN AUTO: 28.6 PG (ref 27–31)
MCHC RBC AUTO-ENTMCNC: 31.3 G/DL (ref 32–36)
MCV RBC AUTO: 92 FL (ref 82–98)
METAMYELOCYTES NFR BLD MANUAL: 3 %
MONOCYTES # BLD AUTO: ABNORMAL K/UL (ref 0.3–1)
MONOCYTES NFR BLD: 19 % (ref 4–15)
NEUTROPHILS NFR BLD: 35 % (ref 38–73)
NEUTS BAND NFR BLD MANUAL: 6 %
NRBC BLD-RTO: 0 /100 WBC
NUM UNITS TRANS PACKED RBC: NORMAL
OVALOCYTES BLD QL SMEAR: ABNORMAL
PHOSPHATE SERPL-MCNC: 2.4 MG/DL (ref 2.7–4.5)
PLATELET # BLD AUTO: 38 K/UL (ref 150–450)
PLATELET BLD QL SMEAR: ABNORMAL
PMV BLD AUTO: 9.9 FL (ref 9.2–12.9)
POIKILOCYTOSIS BLD QL SMEAR: SLIGHT
POLYCHROMASIA BLD QL SMEAR: ABNORMAL
POTASSIUM SERPL-SCNC: 3.6 MMOL/L (ref 3.5–5.1)
PROT SERPL-MCNC: 5 G/DL (ref 6–8.4)
PROTHROMBIN TIME: 13.4 SEC (ref 9–12.5)
RBC # BLD AUTO: 2.34 M/UL (ref 4–5.4)
SODIUM SERPL-SCNC: 130 MMOL/L (ref 136–145)
SPHEROCYTES BLD QL SMEAR: ABNORMAL
TARGETS BLD QL SMEAR: ABNORMAL
WBC # BLD AUTO: 0.67 K/UL (ref 3.9–12.7)

## 2023-02-23 PROCEDURE — 63600175 PHARM REV CODE 636 W HCPCS: Performed by: STUDENT IN AN ORGANIZED HEALTH CARE EDUCATION/TRAINING PROGRAM

## 2023-02-23 PROCEDURE — 99233 PR SUBSEQUENT HOSPITAL CARE,LEVL III: ICD-10-PCS | Mod: ,,, | Performed by: FAMILY MEDICINE

## 2023-02-23 PROCEDURE — 63600175 PHARM REV CODE 636 W HCPCS: Performed by: NURSE PRACTITIONER

## 2023-02-23 PROCEDURE — 99498 ADVNCD CARE PLAN ADDL 30 MIN: CPT | Mod: CR,,, | Performed by: NURSE PRACTITIONER

## 2023-02-23 PROCEDURE — 86900 BLOOD TYPING SEROLOGIC ABO: CPT | Performed by: FAMILY MEDICINE

## 2023-02-23 PROCEDURE — 99233 SBSQ HOSP IP/OBS HIGH 50: CPT | Mod: ,,, | Performed by: INTERNAL MEDICINE

## 2023-02-23 PROCEDURE — P9047 ALBUMIN (HUMAN), 25%, 50ML: HCPCS | Mod: JG | Performed by: STUDENT IN AN ORGANIZED HEALTH CARE EDUCATION/TRAINING PROGRAM

## 2023-02-23 PROCEDURE — 85610 PROTHROMBIN TIME: CPT | Performed by: STUDENT IN AN ORGANIZED HEALTH CARE EDUCATION/TRAINING PROGRAM

## 2023-02-23 PROCEDURE — 25000003 PHARM REV CODE 250: Performed by: FAMILY MEDICINE

## 2023-02-23 PROCEDURE — 84100 ASSAY OF PHOSPHORUS: CPT | Performed by: STUDENT IN AN ORGANIZED HEALTH CARE EDUCATION/TRAINING PROGRAM

## 2023-02-23 PROCEDURE — 97530 THERAPEUTIC ACTIVITIES: CPT | Mod: CQ

## 2023-02-23 PROCEDURE — P9016 RBC LEUKOCYTES REDUCED: HCPCS | Performed by: FAMILY MEDICINE

## 2023-02-23 PROCEDURE — 25000003 PHARM REV CODE 250: Performed by: STUDENT IN AN ORGANIZED HEALTH CARE EDUCATION/TRAINING PROGRAM

## 2023-02-23 PROCEDURE — 25000003 PHARM REV CODE 250

## 2023-02-23 PROCEDURE — 97112 NEUROMUSCULAR REEDUCATION: CPT | Mod: CQ

## 2023-02-23 PROCEDURE — 85027 COMPLETE CBC AUTOMATED: CPT

## 2023-02-23 PROCEDURE — 63600175 PHARM REV CODE 636 W HCPCS

## 2023-02-23 PROCEDURE — 99497 PR ADVNCD CARE PLAN 30 MIN: ICD-10-PCS | Mod: 25,CR,, | Performed by: NURSE PRACTITIONER

## 2023-02-23 PROCEDURE — 25000003 PHARM REV CODE 250: Performed by: PSYCHIATRY & NEUROLOGY

## 2023-02-23 PROCEDURE — 99497 ADVNCD CARE PLAN 30 MIN: CPT | Mod: 25,CR,, | Performed by: NURSE PRACTITIONER

## 2023-02-23 PROCEDURE — 83735 ASSAY OF MAGNESIUM: CPT | Performed by: STUDENT IN AN ORGANIZED HEALTH CARE EDUCATION/TRAINING PROGRAM

## 2023-02-23 PROCEDURE — 99223 PR INITIAL HOSPITAL CARE,LEVL III: ICD-10-PCS | Mod: CR,,, | Performed by: NURSE PRACTITIONER

## 2023-02-23 PROCEDURE — 80053 COMPREHEN METABOLIC PANEL: CPT | Performed by: STUDENT IN AN ORGANIZED HEALTH CARE EDUCATION/TRAINING PROGRAM

## 2023-02-23 PROCEDURE — 85007 BL SMEAR W/DIFF WBC COUNT: CPT

## 2023-02-23 PROCEDURE — 99498 PR ADVNCD CARE PLAN ADDL 30 MIN: ICD-10-PCS | Mod: CR,,, | Performed by: NURSE PRACTITIONER

## 2023-02-23 PROCEDURE — 25000003 PHARM REV CODE 250: Performed by: INTERNAL MEDICINE

## 2023-02-23 PROCEDURE — 99223 1ST HOSP IP/OBS HIGH 75: CPT | Mod: CR,,, | Performed by: NURSE PRACTITIONER

## 2023-02-23 PROCEDURE — 99233 PR SUBSEQUENT HOSPITAL CARE,LEVL III: ICD-10-PCS | Mod: ,,, | Performed by: INTERNAL MEDICINE

## 2023-02-23 PROCEDURE — 97535 SELF CARE MNGMENT TRAINING: CPT

## 2023-02-23 PROCEDURE — 99233 SBSQ HOSP IP/OBS HIGH 50: CPT | Mod: ,,, | Performed by: FAMILY MEDICINE

## 2023-02-23 PROCEDURE — 20600001 HC STEP DOWN PRIVATE ROOM

## 2023-02-23 PROCEDURE — A4216 STERILE WATER/SALINE, 10 ML: HCPCS | Performed by: PSYCHIATRY & NEUROLOGY

## 2023-02-23 PROCEDURE — 86920 COMPATIBILITY TEST SPIN: CPT | Performed by: FAMILY MEDICINE

## 2023-02-23 PROCEDURE — 25000003 PHARM REV CODE 250: Performed by: HOSPITALIST

## 2023-02-23 PROCEDURE — 27000207 HC ISOLATION

## 2023-02-23 PROCEDURE — 63600175 PHARM REV CODE 636 W HCPCS: Mod: JG | Performed by: STUDENT IN AN ORGANIZED HEALTH CARE EDUCATION/TRAINING PROGRAM

## 2023-02-23 PROCEDURE — 97530 THERAPEUTIC ACTIVITIES: CPT

## 2023-02-23 RX ORDER — BUMETANIDE 1 MG/1
1 TABLET ORAL
Status: DISCONTINUED | OUTPATIENT
Start: 2023-02-23 | End: 2023-02-28 | Stop reason: HOSPADM

## 2023-02-23 RX ORDER — HYDROCODONE BITARTRATE AND ACETAMINOPHEN 500; 5 MG/1; MG/1
TABLET ORAL
Status: DISCONTINUED | OUTPATIENT
Start: 2023-02-23 | End: 2023-02-27

## 2023-02-23 RX ORDER — CALCIUM CARBONATE 200(500)MG
1000 TABLET,CHEWABLE ORAL 3 TIMES DAILY PRN
Status: DISCONTINUED | OUTPATIENT
Start: 2023-02-23 | End: 2023-02-28 | Stop reason: HOSPADM

## 2023-02-23 RX ADMIN — HEPARIN SODIUM 5000 UNITS: 5000 INJECTION INTRAVENOUS; SUBCUTANEOUS at 08:02

## 2023-02-23 RX ADMIN — OXYCODONE HYDROCHLORIDE 10 MG: 10 TABLET ORAL at 08:02

## 2023-02-23 RX ADMIN — METHOCARBAMOL 1000 MG: 500 TABLET ORAL at 05:02

## 2023-02-23 RX ADMIN — METHOCARBAMOL 1000 MG: 500 TABLET ORAL at 11:02

## 2023-02-23 RX ADMIN — Medication 10 ML: at 06:02

## 2023-02-23 RX ADMIN — ACETAMINOPHEN 1000 MG: 500 TABLET ORAL at 08:02

## 2023-02-23 RX ADMIN — Medication 10 ML: at 12:02

## 2023-02-23 RX ADMIN — HEPARIN SODIUM 5000 UNITS: 5000 INJECTION INTRAVENOUS; SUBCUTANEOUS at 06:02

## 2023-02-23 RX ADMIN — BUPROPION HYDROCHLORIDE 150 MG: 150 TABLET, FILM COATED, EXTENDED RELEASE ORAL at 08:02

## 2023-02-23 RX ADMIN — MEROPENEM 2 G: 1 INJECTION INTRAVENOUS at 08:02

## 2023-02-23 RX ADMIN — BUMETANIDE 1 MG: 1 TABLET ORAL at 08:02

## 2023-02-23 RX ADMIN — GABAPENTIN 900 MG: 300 CAPSULE ORAL at 08:02

## 2023-02-23 RX ADMIN — ALBUMIN (HUMAN) 25 G: 12.5 SOLUTION INTRAVENOUS at 06:02

## 2023-02-23 RX ADMIN — METHOCARBAMOL 1000 MG: 500 TABLET ORAL at 06:02

## 2023-02-23 RX ADMIN — Medication 10 ML: at 11:02

## 2023-02-23 RX ADMIN — MEROPENEM 2 G: 1 INJECTION INTRAVENOUS at 03:02

## 2023-02-23 RX ADMIN — HEPARIN SODIUM 5000 UNITS: 5000 INJECTION INTRAVENOUS; SUBCUTANEOUS at 02:02

## 2023-02-23 RX ADMIN — METHOCARBAMOL 1000 MG: 500 TABLET ORAL at 12:02

## 2023-02-23 RX ADMIN — ALBUMIN (HUMAN) 25 G: 12.5 SOLUTION INTRAVENOUS at 02:02

## 2023-02-23 RX ADMIN — PANTOPRAZOLE SODIUM 40 MG: 40 TABLET, DELAYED RELEASE ORAL at 08:02

## 2023-02-23 RX ADMIN — HYDROMORPHONE HYDROCHLORIDE 0.5 MG: 1 INJECTION, SOLUTION INTRAMUSCULAR; INTRAVENOUS; SUBCUTANEOUS at 11:02

## 2023-02-23 RX ADMIN — DULOXETINE 30 MG: 30 CAPSULE, DELAYED RELEASE ORAL at 08:02

## 2023-02-23 RX ADMIN — Medication 10 ML: at 05:02

## 2023-02-23 RX ADMIN — MEROPENEM 2 G: 1 INJECTION INTRAVENOUS at 11:02

## 2023-02-23 RX ADMIN — GABAPENTIN 900 MG: 300 CAPSULE ORAL at 02:02

## 2023-02-23 NOTE — PROGRESS NOTES
Plastic and Reconstructive Surgery   Progress Note    Subjective:    Patient seen and examined at bedside. No acute events overnight. Pain controlled. 615cc out of all drains.     Objective:  Vital signs in last 24 hours:  Temp:  [98.1 °F (36.7 °C)-98.4 °F (36.9 °C)] 98.2 °F (36.8 °C)  Pulse:  [] 102  Resp:  [17-18] 17  SpO2:  [97 %-100 %] 97 %  BP: ()/(50-60) 99/58    Intake/Output last 3 shifts:  I/O last 3 completed shifts:  In: -   Out: 1565 [Urine:950; Drains:615]    Intake/Output this shift:  No intake/output data recorded.        Physical Exam:  VITAL SIGNS:   Vitals:    23 2307 23 0315 23 0338 23 0830   BP: (!) 87/50  131/60 (!) 99/58   BP Location:    Left arm   Patient Position:    Lying   Pulse: 105 105 93 102   Resp:    17   Temp: 98.4 °F (36.9 °C)  98.1 °F (36.7 °C) 98.2 °F (36.8 °C)   TempSrc:    Oral   SpO2: 98%   97%   Weight:       Height:         TMAX: Temp (24hrs), Av.2 °F (36.8 °C), Min:98.1 °F (36.7 °C), Max:98.4 °F (36.9 °C)    General: Alert; No acute distress  Cardiovascular: Regular rate   Respiratory: Normal respiratory effort. Chest rise symmetric.   Abdomen: Soft, nontender, nondistended  Extremity: Moves all extremities equally.  Neurologic: No focal deficit. Speech normal  Spine incision is intact. Staples still in place.no signs of infection      Scheduled Medications acetaminophen, 1,000 mg, Q12H  albumin human 25%, 25 g, Q8H  bisacodyL, 10 mg, Every other day  buPROPion, 150 mg, Daily  DULoxetine, 30 mg, BID  gabapentin, 900 mg, TID  heparin (porcine), 5,000 Units, Q8H  lactulose, 20 g, TID  meropenem (MERREM) IVPB, 2 g, Q8H  methocarbamoL, 1,000 mg, Q6H  pantoprazole, 40 mg, Daily  sodium chloride 0.9%, 10 mL, Q6H      PRN Medications dextrose 10%, dextrose 10%, diphenhydrAMINE, glucagon (human recombinant), glucose, glucose, hydrALAZINE, HYDROmorphone, hydrOXYzine HCL, melatonin, naloxone, ondansetron, oxyCODONE **AND** oxyCODONE,  polyethylene glycol, predniSONE, prochlorperazine, senna-docusate 8.6-50 mg, simethicone, Flushing PICC Protocol **AND** sodium chloride 0.9% **AND** sodium chloride 0.9%    Recent Labs:   Lab Results   Component Value Date    WBC 0.67 (LL) 02/23/2023    HGB 6.7 (L) 02/23/2023    HCT 21.4 (L) 02/23/2023    MCV 92 02/23/2023    PLT 38 (LL) 02/23/2023     Lab Results   Component Value Date     02/23/2023     (L) 02/23/2023    K 3.6 02/23/2023    CL 94 (L) 02/23/2023    BUN 14 02/23/2023         Assessment: 42 y.o. y/o female 21 Days Post-Op s/p Procedure(s):  LAMINECTOMY, SPINE, THORACIC, WITH FUSION (T4-Pelvis fusion w/ T9-10 corpectomies) **AIRO Doing well postoperatively.  Drains with SSO , decreasing output    Plan  -Continue to monitor drain output  -Pain control  -possible Staple removal this week  -Rest of care per primary

## 2023-02-23 NOTE — ASSESSMENT & PLAN NOTE
-Confirms no chest compressions in the event of cardiopulmonary arrest but she would want short-term intubation for respiratory distress not relieved by other measures  -We discussed the importance of designating HCPOA and discussing her wishes with those she is considering designating  -Patient is aware she is hospice eligible but this does not align with her goals of care  -Patient's goals of care: to focus on as much improvement as possible  -Continue current care  -Palliative will continue to follow for ongoing GOC and patient support.     Palliative medicine consult received for goals of care discussion/advance care planning.  Rachel Zazueta is a 42-year-old female admitted for weakness of both lower extremities, hypotension, E.Coli infection, thoracic discitis, chylous ascites, ascites d/t alcoholic cirrhosis, cirrhosis of liver with ascites, acute blood loss anemia, kyphosis of thoracolumbar region, obesity, substance use disorder, pancytopenia.     At the time of my visit, patient resting, denies any current complaints.  She was receptive to visit after I introduced palliative services.  We discussed her life in general, the toll her medical conditions have taken on her recently, and what is most important to her.  She expresses very good understanding of the significance of her condition.  She is able to tell me about her medical history, the events that lead to current hospitalization, and tells me that she understands she is not a candidate for a liver transplant and she understands this is a life-limiting condition.  While she understands the significance of this, she wants to focus on as much improvement as possible. She wants to live as much as possible and wants to try her best to have more time to do things that are on her bucket list.  She watched her mother die of liver cancer and understands that she may not get to do all the things she wants and may not achieve the improvement she wants but  it is important to her to try. We discussed hospice as an option and she is aware of the services, philosophy, and benefits of hospice as her mother was enrolled in hospice care prior to her death. She acknowledges that at some point, hospice may be the only or best option, but for now, she wants to continue current care with LTAC as the next step as she has discussed with the primary team.     We discussed her support system and those that help her cope.  She has friends that she leans on but one of the closest people to her is her friend, Minor Reynoso Jr.  They have been in a relationship for 2 1/2 years but during this hospitalization have broken up, although he remains a close friend and very important to her.     Advance Care Planning     Date: 02/23/2023    Power of   I initiated the process of advance care planning today and explained the importance of this process to the patient.  We discussed the importance of designating a Healthcare Power of  in the event she would be unable to make medical decisions for herself.  At this time, patient believes that she would want Minor Reynoso Jr to make medical decisions for her and she has other friends she may want to designate.  She would like to consider later today and is receptive to follow up tomorrow.     We reviewed current code status.  Patient confirms NO CHEST COMPRESSIONS in the event of cardiopulmonary arrest.  In the past, she has stated that she would not want intubation but CONFIRMS PARTIAL CODE STATUS. SHE WOULD WANT SHORT-TERM INTUBATION FOR RESPIRATORY DISTRESS NOT RELIEVED BY OTHER MEASURES. We discussed the importance of discussing her wishes with those she is considering designating HCPOA.      Palliative will continue to follow for ongoing GOC discussions and patient support.

## 2023-02-23 NOTE — PLAN OF CARE
Problem: Adult Inpatient Plan of Care  Goal: Plan of Care Review  Outcome: Ongoing, Progressing  Goal: Patient-Specific Goal (Individualized)  Outcome: Ongoing, Progressing  Goal: Absence of Hospital-Acquired Illness or Injury  Outcome: Ongoing, Progressing  Goal: Optimal Comfort and Wellbeing  Outcome: Ongoing, Progressing  Goal: Readiness for Transition of Care  Outcome: Ongoing, Progressing     Problem: Adjustment to Illness (Sepsis/Septic Shock)  Goal: Optimal Coping  Outcome: Ongoing, Progressing     Problem: Bleeding (Sepsis/Septic Shock)  Goal: Absence of Bleeding  Outcome: Ongoing, Progressing     Problem: Glycemic Control Impaired (Sepsis/Septic Shock)  Goal: Blood Glucose Level Within Desired Range  Outcome: Ongoing, Progressing     Problem: Infection Progression (Sepsis/Septic Shock)  Goal: Absence of Infection Signs and Symptoms  Outcome: Ongoing, Progressing     Problem: Infection  Goal: Absence of Infection Signs and Symptoms  Outcome: Ongoing, Progressing     Problem: Skin Injury Risk Increased  Goal: Skin Health and Integrity  Outcome: Ongoing, Progressing     Problem: Fall Injury Risk  Goal: Absence of Fall and Fall-Related Injury  Outcome: Ongoing, Progressing     Problem: Adjustment to Illness (Stroke, Hemorrhagic)  Goal: Optimal Coping  Outcome: Ongoing, Progressing     Problem: Bowel Elimination Impaired (Stroke, Hemorrhagic)  Goal: Effective Bowel Elimination  Outcome: Ongoing, Progressing     Problem: Cerebral Tissue Perfusion (Stroke, Hemorrhagic)  Goal: Optimal Cerebral Tissue Perfusion  Outcome: Ongoing, Progressing     Problem: Cognitive Impairment (Stroke, Hemorrhagic)  Goal: Optimal Cognitive Function  Outcome: Ongoing, Progressing     Problem: Communication Impairment (Stroke, Hemorrhagic)  Goal: Effective Communication Skills  Outcome: Ongoing, Progressing     Problem: Functional Ability Impaired (Stroke, Hemorrhagic)  Goal: Optimal Functional Ability  Outcome: Ongoing, Progressing      Problem: Pain (Stroke, Hemorrhagic)  Goal: Acceptable Pain Control  Outcome: Ongoing, Progressing     Problem: Respiratory Compromise (Stroke, Hemorrhagic)  Goal: Effective Oxygenation and Ventilation  Outcome: Ongoing, Progressing     Problem: Sensorimotor Impairment (Stroke, Hemorrhagic)  Goal: Improved Sensorimotor Function  Outcome: Ongoing, Progressing     Problem: Swallowing Impairment (Stroke, Hemorrhagic)  Goal: Optimal Eating and Swallowing Without Aspiration  Outcome: Ongoing, Progressing     Problem: Urinary Elimination Impaired (Stroke, Hemorrhagic)  Goal: Effective Urinary Elimination  Outcome: Ongoing, Progressing     Problem: Bariatric Environmental Safety  Goal: Safety Maintained with Care  Outcome: Ongoing, Progressing     Problem: Coping Ineffective  Goal: Effective Coping  Outcome: Ongoing, Progressing

## 2023-02-23 NOTE — ASSESSMENT & PLAN NOTE
42 y.o F w/ hx of IV drug use (meth), chronic HCV with cirrhosis, spinal fusion (04/2021), epidural abscess with removal of hardware (02/2022), spinal fusion with hardware (T10 - Pelvis / 03/2022), and neurogenic bladder here with complicated ESBL E.coli epidural abscess s/p washout,corpectomy, fixation being treated w/ meropenem.     Nephrology consulted for hyponatremia. Patient reports uncomfortable abdominal distension and leg swelling. Reports drinking 3-4 cups of her 30oz water tumbler daily. Urine Na <10, urine osm 513. Serum osm 283. Serum Na stable. Normal mentation.    Hyponatremia likely 2/2 to low effective circulating volume 2/2 cirrhosis. Possible poor solute intake relative to free water intake. Unlikely SIADH given low urine Na.   Repeat urine studies 2/14 suggestive of appropriate ADH in setting of cirrhosis    Recommending 1 unit of PRBC for hemoglobin < 7, can stop albumin infusion after transfusion. Additionally recommend 1 mg PO of bumex to assist with volume status

## 2023-02-23 NOTE — CHAPLAIN
"Palliative Care     Patient: Rachel Zazueta       MRN: [id number]  : 1980  Age: 42 y.o.  Hospital Length of Stay: 35 days  Code Status: Partial Code   Attending Provider: Blas Gilman III,*  Principal Problem: Weakness of both lower extremities    Present during Interview:  Visited palliative pt alone, no family/friends present.     Non-clinical observations:  Pt awake, sitting up in bed, welcomed me into room. Room covered with belongings and snack foods.    Facts (Spiritual Perception of Illness):   Pt been hospitalized for over a month.    Feelings/Friends (Emotions/Fears/Experiences/Coping):      Pt shared some of her medical condition and health journey and that she and her boyfriend broke up since she's been in the hospital. Provided compassionate presence and listening ear for her laments.  Ask about other friends and she said she has a few. I didn't get to family questions, allowing her to lead the conversation.     Pt seems to be aware of all her health ailments and that she will be going to an LTAC and/or Rehab of some sorts once she's medically cleared. She seemed ok with that.  was present when the hospital ist came in and stayed while he spoke to her; he mentioned he put consult in for palliative and I re-introduced myself. After doc left pt asked me more about palliative care, what it is and how it's different from hospice; explained and gave her brochure. Pt appreciated.    Luna (Beliefs/Meaning/Philosophy):  Pt identifies as Hinduism, "but doesn't believe in organized Judaism". She shared her beliefs and practices.   Wasn't able to delve deeper because a team of docs came into the room. Told pt I'd visit again tomorrow and I'll pray with her and she agreed.     Future (Spiritual Care Plan of Action):  Palliative  will continue to follow. Lord, in your mercy.    In Peace,  Rev. Suha Barrett MBA, MDiv   "

## 2023-02-23 NOTE — CONSULTS
[facility] - Telemetry Stepdown  Palliative Medicine  Consult Note    Patient Name: Rachel Zazueta  MRN: [id number]  Admission Date: 1/19/2023  Hospital Length of Stay: 35 days  Code Status: Partial Code   Attending Provider: Blas Gilman III,*  Consulting Provider: Candy Castellon NP  Primary Care Physician: Dannielle Merino DO  Principal Problem:Weakness of both lower extremities    Patient information was obtained from patient, past medical records and primary team.      Consults  Assessment/Plan:     Palliative Care  Palliative care encounter  -Confirms no chest compressions in the event of cardiopulmonary arrest but she would want short-term intubation for respiratory distress not relieved by other measures  -We discussed the importance of designating HCPOA and discussing her wishes with those she is considering designating  -Patient is aware she is hospice eligible but this does not align with her goals of care  -Patient's goals of care: to focus on as much improvement as possible  -Continue current care  -Palliative will continue to follow for ongoing GOC and patient support.     Palliative medicine consult received for goals of care discussion/advance care planning.  Rachel Zazueta is a 42-year-old female admitted for weakness of both lower extremities, hypotension, E.Coli infection, thoracic discitis, chylous ascites, ascites d/t alcoholic cirrhosis, cirrhosis of liver with ascites, acute blood loss anemia, kyphosis of thoracolumbar region, obesity, substance use disorder, pancytopenia.     At the time of my visit, patient resting, denies any current complaints.  She was receptive to visit after I introduced palliative services.  We discussed her life in general, the toll her medical conditions have taken on her recently, and what is most important to her.  She expresses very good understanding of the significance of her condition.  She is able to tell me about her medical history, the events  that lead to current hospitalization, and tells me that she understands she is not a candidate for a liver transplant and she understands this is a life-limiting condition.  While she understands the significance of this, she wants to focus on as much improvement as possible. She wants to live as much as possible and wants to try her best to have more time to do things that are on her bucket list.  She watched her mother die of liver cancer and understands that she may not get to do all the things she wants and may not achieve the improvement she wants but it is important to her to try. We discussed hospice as an option and she is aware of the services, philosophy, and benefits of hospice as her mother was enrolled in hospice care prior to her death. She acknowledges that at some point, hospice may be the only or best option, but for now, she wants to continue current care with LTAC as the next step as she has discussed with the primary team.     We discussed her support system and those that help her cope.  She has friends that she leans on but one of the closest people to her is her friend, Minor Reynoso Jr.  They have been in a relationship for 2 1/2 years but during this hospitalization have broken up, although he remains a close friend and very important to her.     Advance Care Planning     Date: 02/23/2023    Power of   I initiated the process of advance care planning today and explained the importance of this process to the patient.  We discussed the importance of designating a Healthcare Power of  in the event she would be unable to make medical decisions for herself.  At this time, patient believes that she would want Minor Reynoso Jr to make medical decisions for her and she has other friends she may want to designate.  She would like to consider later today and is receptive to follow up tomorrow.     We reviewed current code status.  Patient confirms NO CHEST COMPRESSIONS in the event of  cardiopulmonary arrest.  In the past, she has stated that she would not want intubation but CONFIRMS PARTIAL CODE STATUS. SHE WOULD WANT SHORT-TERM INTUBATION FOR RESPIRATORY DISTRESS NOT RELIEVED BY OTHER MEASURES. We discussed the importance of discussing her wishes with those she is considering designating HCPOA.      Palliative will continue to follow for ongoing GOC discussions and patient support.                 Thank you for your consult. I will follow-up with patient. Please contact us if you have any additional questions.    Subjective:     HPI:   HPI per chart review: Ms. Zazueta is a 42 y.o F w/ hx ofIV drug use (methamphetamine), chronic HCV with cirrhosis, spinal fusion (04/2021), epidural abscess with removal of hardware (02/2022), spinal fusion with hardware (T10 - Pelvis / 03/2022), obesity (BMI 39.3) and neurogenic bladder who initially presented to [facility] for evaluation of back pain and left leg weakness.  She reported initially noting the left leg weakness when she was about to go to the bathroom and was about to fall but was assisted by her boyfriend.  She was brought to the ED via EMS.  Urinalysis with UTI concerns and blood cultures at OSH grew ESBL E coli so she was treated with meropenem.  During hospitalization, she reports the weakness that started out in her left leg initially then spread upwards to her left thigh, left hip, then crossed over to the right hip and spread to her right leg.  Denies recent IV drug use. She reported her last incidence of drug use was more than 3 years ago and also denies tobacco, and alcohol abuse. Reported shortness of breath when sitting up, fever, chills, back pain, lower extremity weakness(initally L>R, but now R>L), diarrhea.  Denies cough, nausea, vomiting, constipation, bladder or bowel incontinence, dysuria, dark urine, new vision changes.OSH course notable for concerning urinalysis and blood cultures positive for ESBL E coli treated with  meropenem.  She was also admit to the ICU where she was treated with Precedex for significant body aches, irritability, and muscle spasms.  Concerns of a murmur on physical exam so she underwent TTE which did not show any vegetations.  Worsening lower extremity weakness workup with MRI head nonspecific, MRI cervical spine with multilevel spondylosis, X-ray spine with multilevel thoracolumbar fusion and diskectomy, focal kyphosis at T9-10 increased compared to prior.  She was started on prophylaxis amoxicillin due to her history of infected hardware.  MRI spine unable to be completed due to patient's body habitus so she was transferred to [facility] [place] [facility] for further imaging and Neurosurgery, Neurology evaluation.        Overview/Hospital Course:  Pt admitted as a transfer from [facility] for worsening LE weakness, concern for spinal infection, and need for MRI which could not be done at OSH. Imaging was completed here. Worsening proximal junctional kyphosis noted at T9-10 noted with concern for discitis at T8-9, T9-10. NSGY evaluated.      Found to have T8-T10 kyphosis on MRI. 1/24 underwent laminectomy T8-T10; posterior fusion T8-T12; and washout. 2/2 underwent T4-pelvis fusion and T9-T10 corpectomy.  ID consulted for thoracic discitis infection.  Patient to complete 8 weeks of therapy with meropenem. Patient underwent flap closure with Plastic surgery.  Step-down Hospital Medicine on 02/08.     2/9   On meropenem for MDR-ESCHERICHIA COLI ESBL  sodium trended down to 129.  Neurosurgery following post OR and aiding in pain management. PCA discontinued 2/8 . On oxycodone 10mg PO q4h . requiring IV dilaudid q4 as needed,. drains to gravity output 990ml/24h . Plastic surgery following flap, managing wounds.  Wound VAC discontinued on Wednesday.  accepted to [facility] pain management consulted for back ache- switched to multimodal therapy. sodium trended down to 127.   2/10 sodium stable at 127 . continues with increased drain output  985ml/24h . negative balance of  2.8 L lasix on hold.  nephrology consulted. abdominal X ray -  Nonobstructive bowel gas pattern.  No free air.  As before, mild gaseous distension of stomach. ammonia at 57 . sono abdomen  . tylenol changed to 1000mg q12h with cirrhosis . sono abdomen -Cirrhotic liver morphology.  Sequela of portal hypertension including moderate ascites, recanalized umbilical vein.  No focal hepatic lesions. Cholecystectomy. . Nephrology recs  fluid restriction to 1 L,  lasix 60 mg IV daily,  and trend sodium q12h.  diazepam discontinued . s/p IR paracentesis today   2/11no evidence of SBP on paracentesis. 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 AFB and Triglyerides on ascitic fluid.  noncompliant with  fluid restriction to 1 L despite counseling. sodium trended down to 126 .received 2 doses of IV lasix 60mg. drain output 250ml. Increased Lasix IV 80 BID  due to UOP 500ml/24h   2/12 UOP of 1250ml/24h. K and P replaced. sodium trended up to 129.  hepatology eval -If ascitic fluid triglyceride level is elevated, needs IR  eval for lymphangiogram and surgery eval for  lymphatic repair. xanax 0.25mg BID PRN for anxiety   2/13 s/p paracentesis. Removed 4,700 ml. DVT sonogram LE negative. s/p psychiatry eval  for anxiety/depression. xanax discontinued. Decreased Bupropion to 150 mg PO Daily . Started Duloxetine 30 mg PO BID and  PRN Hydroxyzine 25 mg PO q8h for anxiety  2/14 Hb 6.9 Transfusion with 1 unit of PRBC . FOBT. sodium trended up to 130 . Diuresing well with lasix IV . DVT sonogram - Nonspecific fluid collection adjacent to the left iliac vein.  Recommend further evaluation with contrast enhanced CT scan . has Iodine contrast allergy- . 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  Diuresing well  on IV lasix BID. negative balance of  13.6 L . IR eval -repeat paracentesis to trend volume output and  conservative management   with a low fat diet, octreotide, diuretics. Neurosurgery for input regarding chylous ascitis as possible neurosurgical complication or not. CT abdomen done overnight as well - Postop change in the spine.  There is lucency about the bi iliac screws concerning for loosening.  Bony destruction T10 with interbody strut. started on salt tablets 1g BID x 1day and lasix drip 10mg/h x 6h .  spironolactone  increased to 100 mg Qday. Continue IV Lasix. Dietary consulted for  high-protein and low-fat diet with medium-chain triglycerides  diet.  paracentesis 6.6L removed   2/16 Continuing gentle IV lasix with assistance from nephrology, remains fluid overloaded  2/17: continue Lasix infusion, responding well  2/18-2/19:  Please see progress notes from those dates.     2/20: D/w nephrology to transition from IV Lasix to IV bumex for further diuresis, though Na likely optimized as much as possible. D/w NSGY re xray prior to d/c ( sitting upright in the stretcher is fine.) D/w hepatology: IR wanting to defer lymphangiogram at this time.   2/21:  Discussed with plastic surgery; goal for each drain 30 cc or less daily  2/22:  Discussed with Nephrology, will continue albumin infusions at this time.  However, long-term goals unclear as unable to sustain with albumin or even pressor support if stepped up to ICU.  Discussed in depth with Nephrology and hepatology.  Per hepatology, Discussed with Dr Santiago. Not a tx candidate.     Palliative medicine was consulted for goals of care discussions/advance care planning.       Hospital Course:  No notes on file    Hospital problems/plan of care as per primary team:  Weakness of both lower extremities  42 year old woman s/p multiple spinal surgeries presented to OSH with possible infection of shoulder. Found to have UTI and E. Coli bacteremia and progressively worse  BLE weakness. Transferred to [facility] [place] for neurosurgical evaluation. Underwent Laminectomy T8-T10; Posterior spinal fusion T8-T12; hardware removal and washout on 1/24. Admitted to [facility] postop. On 2/2 underwent T4-pelvis fusion and T9-T10 corpectomies. To complete 8 week course of meropenem per ID for ESBL E coli from bone culture, end date 3/29.     - neuro checks q4h  - meropenem for ESBL bacteremia per ID, eot 3/29/23  - Plastic surgery following, wound vac discontinued  - CMP, Mag, Phos, CBC daily  - MAP goals >65, SBP < 180  - PRN labetalol, hydralazine  - MM pain regimen: PRN Dilaudid, Tylenol, gabapentin, robaxin, PRN oxycodone  - Aggressive bowel regimen  - PT/OT      Hypotension  -multifactorial in the setting of narcotics, liver failure, recurrent chylous ascites, and intermittent diuresis  - given bolus 500 x1 2/19, lactic acid negative  -continues to remain soft, though improving on albumin  - Will continue to monitor blood pressure trend closely and adjust medication regimen as clinically indicated and tolerated        E coli infection  See weakness of both lower extremities   Meropenem for ESBL bacteremia per ID until 3/29        Thoracic discitis  See Weakness of both lower extremities     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  IR consulted  for lymphangiogram and  eval for  lymphatic repair  2/15  IR eval -repeat paracentesis to trend volume output and  conservative management    spironolactone  increased to 100 mg Qday. Continue IV Lasix. Dietary consulted for  high-protein and low-fat diet with medium-chain triglycerides  diet.      Will continue to monitor for further paracentesis need.  IR deferring lymphangiogram at this time.     Ascites due to alcoholic cirrhosis  2/10  sono abdomen -Cirrhotic liver morphology.  Sequela of portal hypertension including moderate ascites, recanalized umbilical vein.  No focal hepatic lesions. Cholecystectomy. s/p IR  paracentesis as above  2/11no evidence of SBP on paracentesis. 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 AFB and Triglyerides on ascitic fluid.    2/12  hepatology eval -If ascitic fluid triglyceride level is elevated, needs IR  eval for lymphangiogram and surgery eval for  lymphatic repair     Discussed with hepatology, not a liver transplant candidate.  IR deferring lymph angiogram at this time.     Chronic hepatitis C with cirrhosis  See above     Cirrhosis of liver with ascites  MELD-Na score: 11 at 2/22/2023  6:36 AM  MELD score: 11 at 2/22/2023  6:36 AM  Calculated from:  Serum Creatinine: 0.5 mg/dL (Using min of 1 mg/dL) at 2/22/2023  6:36 AM  Serum Sodium: 130 mmol/L at 2/22/2023  6:36 AM  Total Bilirubin: 1.3 mg/dL at 2/22/2023  6:36 AM  INR(ratio): 1.4 at 2/22/2023  6:36 AM  Age: 42 years        Patient has reportedly refused transplant evaluation in the past.   - Daily CMP and trend LFTs  - Continue Lactulose 20 g TID  - Lasix 40 mg PO BID  - Will need Hepatology follow up outpatient  2/10   ammonia at 57 . sono abdomen  . tylenol changed to 1000mg q12h with cirrhosis . s/p IR paracentesis today   2/11no evidence of SBP on paracentesis. 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 AFB and Triglyerides on ascitic fluid.    2/13 s/p paracentesis. Removed 4,700 ml. DVT sonogram LE negative.2/13 s/p paracentesis. Removed 4,700 ml. DVT sonogram LE negative. s/p psychiatry eval  for anxiety/depression. xanax discontinued. Decreased Bupropion to 150 mg PO Daily . Started Duloxetine 30 mg PO BID and  PRN Hydroxyzine 25 mg PO q8h for anxiety     Hyponatremia  - Patient with sodium         Recent Labs   Lab 02/20/23  0351 02/21/23  0436 02/22/23  0636   * 127* 130*   . sodium trending down     2/9 monitor on lasix BID.sodium trended down to 127.   2/10 sodium stable at 127 . continues with increased  drain output 985ml/24h . negative balance of  2.8 L lasix on hold.  nephrology consulted.  Nephrology recs  fluid restriction to 1 L,  lasix 60 mg IV daily,  and trend sodium q12h.  diazepam discontinued   2/11  noncompliant with  fluid restriction to 1 L despite counseling. sodium trended down to 126 .received 2 doses of IV lasix 60mg. Increased Lasix IV 80 BID  due to UOP 500ml/24h   2/12 UOP of 1250ml/24h.sodium trended up to 129   2/14 sodium trended up to 130 . Diuresing well with lasix IV .   2/15 started on salt tablets 1g BID and lasix drip 10mg/h x 6h  2/16 continue Lasix 40 IVP x 1 with Lasix drip 10mg/hr x 6   2/17 sodium 134  2/18 sodium 131. On lasix drip . Increased from 5 to 10  2/19 sodium 130, lasix stopped   2/20 Bumex initiated per Nephrology recs  2/21, 2/22 diuresis held and albumin initiated     Acute blood loss anemia  Hb 6.4 on 2/6, received 1U pRBCs. Hb 2/7 WNL.  - monitor Hb  - transfuse to maintain Hb >7  2/9     Patient's with Normocytic anemia.. Hemoglobin stable. Etiology likely due to chronic disease .  Current CBC reviewed-          Recent Labs   Lab 02/19/23  1255 02/20/23  0353 02/21/23  0436   HGB 8.0* 8.3* 8.6*    Monitor CBC and transfuse if H/H drops below 7/21.   2/14 Hb 6.9 Transfusion with 1 unit of PRBC . FOBT negative  2/17: hgb 8.1, stable  2/18: hbg 8.2 stable   2/19 stable , hbg 8     Abdominal pain  2/10  abdominal X ray -  Nonobstructive bowel gas pattern.  No free air.  As before, mild gaseous distension of stomach. ammonia at 57 . sono abdomen with ascitis . simethicone PRN  2/11no evidence of SBP on paracentesis. 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 AFB and Triglyerides on ascitic fluid.       Kyphosis of thoracolumbar region  See weakness     Anxiety and depression  Evaluated by Psychiatry at OSH prior to transfer. Recommended increasing bupropion.  - Continue bupropion 300 mg QD  - Gabapentin 600 mg TID  for anxiety and neuropathy  - PRN diazepam 5 mg q6h discontinued  2/13   s/p psychiatry eval  for anxiety/depression. xanax discontinued. Decreased Bupropion to 150 mg PO Daily . Started Duloxetine 30 mg PO BID and  PRN Hydroxyzine 25 mg PO q8h for anxiety     Severe obesity (BMI >= 40)  Body mass index is 48.58 kg/m². Morbid obesity complicates all aspects of disease management from diagnostic modalities to treatment. Weight loss encouraged     Substance use disorder  Denies IV drug use (meth) for past 3 years. Denies alcohol and tobacco abuse.      Pancytopenia  - acute on chronic  - follows with hematology outpatient  - CBC daily      Past Medical History:   Diagnosis Date    Anemia     Anxiety     Depressed     Diskitis     Hep C w/o coma, chronic     IV drug abuse     Kidney stone     Liver cirrhosis        Past Surgical History:   Procedure Laterality Date    ARTHROTOMY OF SHOULDER Left 11/15/2022    Procedure: ARTHROTOMY, SHOULDER;  Surgeon: Bjorn Hayes MD;  [facility];  Service: Orthopedics;  Laterality: Left;  Left acromioclavicular joint arthrotomy    BACK SURGERY      benine tumor removal      forehead, 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9    CHOLECYSTECTOMY      KIDNEY SURGERY      LUMBAR FUSION Bilateral 3/2/2022    Procedure: FUSION, SPINE, LUMBAR;  Surgeon: Guille Templeton MD;  Location: [address];  Service: Neurosurgery;  Laterality: Bilateral;  AIRO  T10--Pelvis    LUMBAR FUSION N/A 1/24/2023    Procedure: **AIRO** T8-T12 POSTERIOR FUSION, T8-T10 LAMINECTOMY, HARDWARE REMOVAL AND WASHOUT;  Surgeon: Guille Templeton MD;  Location: [address];  Service: Neurosurgery;  Laterality: N/A;    REMOVAL OF HARDWARE FROM SPINE N/A 2/21/2022    Procedure: REMOVAL, HARDWARE, SPINE;  Surgeon: Guille Templeton MD;  Location: [address];  Service: Neurosurgery;  Laterality: N/A;  Washout    SPINE SURGERY      THORACIC LAMINECTOMY WITH FUSION N/A 2/2/2023    Procedure: LAMINECTOMY, SPINE, THORACIC, WITH  FUSION (T4-Pelvis fusion w/ T9-10 corpectomies) **AIRO;  Surgeon: Guille Templeton MD;  Location: [facility] OR [address] [place] [address];  Service: Neurosurgery;  Laterality: N/A;  AIRO, 2 bovies, aquamantys, Globus, Depuy, antibiotic beads, TXA, Peroxide; [employer] plastics closure       Review of patient's allergies indicates:   Allergen Reactions    Bee venom protein (honey bee) Anaphylaxis     Patient reports she is allergic to bee stings.    Naproxen Anaphylaxis     Throat closing    12-23- Patient reports taking Ibuprofen 200 mg at home without problems. Verified X3. [place]    Wasp sting [allergen ext-venom-honey bee] Anaphylaxis    Adhesive Blisters    Shellfish containing products     Iodine and iodide containing products Hives and Itching     Allergic to iodine in seafood only    Nuts [tree nut] Rash       Medications:  Continuous Infusions:  Scheduled Meds:   acetaminophen  1,000 mg Oral Q12H    bisacodyL  10 mg Rectal Every other day    buPROPion  150 mg Oral Daily    DULoxetine  30 mg Oral BID    gabapentin  900 mg Oral TID    heparin (porcine)  5,000 Units Subcutaneous Q8H    lactulose  20 g Oral TID    meropenem (MERREM) IVPB  2 g Intravenous Q8H    methocarbamoL  1,000 mg Oral Q6H    pantoprazole  40 mg Oral Daily    sodium chloride 0.9%  10 mL Intravenous Q6H     PRN Meds:sodium chloride, bumetanide, calcium carbonate, dextrose 10%, dextrose 10%, diphenhydrAMINE, glucagon (human recombinant), glucose, glucose, hydrALAZINE, HYDROmorphone, hydrOXYzine HCL, melatonin, naloxone, ondansetron, oxyCODONE **AND** oxyCODONE, polyethylene glycol, predniSONE, prochlorperazine, senna-docusate 8.6-50 mg, simethicone, Flushing PICC Protocol **AND** sodium chloride 0.9% **AND** sodium chloride 0.9%    Family History       Problem Relation (Age of Onset)    Cirrhosis Father    Drug abuse Mother, Father    Hepatitis Mother, Father    Liver cancer Mother          Tobacco Use    Smoking status: Some Days     Packs/day: 0.50      Years: 23.00     Pack years: 11.50     Types: Cigarettes    Smokeless tobacco: Never    Tobacco comments:     patient states she knows she nees to quit.   Substance and Sexual Activity    Alcohol use: Not Currently     Comment: quit 2014    Drug use: Yes     Frequency: 4.0 times per week     Types: Marijuana     Comment: Patient denies needle use, only inhalation of methampehtamines or orally.  Last known drug use was prior to admission to hospital stay for surgery    Sexual activity: Yes     Partners: Male     Birth control/protection: None       Review of Systems   Constitutional:  Positive for activity change and fatigue.   HENT:  Negative for congestion and rhinorrhea.    Eyes:  Negative for discharge and itching.   Respiratory:  Negative for cough.    Cardiovascular:  Negative for chest pain.   Gastrointestinal:  Negative for diarrhea and nausea.   Endocrine: Negative for cold intolerance and heat intolerance.   Genitourinary:  Negative for flank pain.   Musculoskeletal:  Positive for back pain.   Skin:  Negative for rash.   Allergic/Immunologic: Negative for environmental allergies.   Neurological:  Negative for seizures and speech difficulty.   Psychiatric/Behavioral:  Negative for behavioral problems and confusion.    Objective:     Vital Signs (Most Recent):  Temp: (P) 98.3 °F (36.8 °C) (02/23/23 1607)  Pulse: (P) 97 (02/23/23 1607)  Resp: (P) 18 (02/23/23 1607)  BP: (!) (P) 108/55 (02/23/23 1607)  SpO2: (P) 99 % (02/23/23 1607)   Vital Signs (24h Range):  Temp:  [98 °F (36.7 °C)-98.8 °F (37.1 °C)] (P) 98.3 °F (36.8 °C)  Pulse:  [] (P) 97  Resp:  [17-18] (P) 18  SpO2:  [96 %-100 %] (P) 99 %  BP: ()/(50-62) (P) 108/55     Weight: (!) 142 kg (313 lb 0.9 oz)  Body mass index is 49.03 kg/m².    Physical Exam  Vitals and nursing note reviewed.   Constitutional:       General: She is not in acute distress.     Appearance: She is obese. She is ill-appearing.   HENT:      Head: Normocephalic and  atraumatic.      Nose: Nose normal. No congestion or rhinorrhea.   Eyes:      General:         Right eye: No discharge.         Left eye: No discharge.   Cardiovascular:      Rate and Rhythm: Normal rate.   Pulmonary:      Effort: Pulmonary effort is normal. No respiratory distress.   Abdominal:      General: There is distension.   Skin:     General: Skin is warm and dry.   Neurological:      Mental Status: She is oriented to person, place, and time.   Psychiatric:         Mood and Affect: Mood normal.         Behavior: Behavior normal.         Thought Content: Thought content normal.         Judgment: Judgment normal.       Review of Symptoms      Symptom Assessment (ESAS 0-10 Scale)  Pain:  0  Dyspnea:  0  Anxiety:  0  Nausea:  0  Depression:  0  Anorexia:  0  Fatigue:  0  Insomnia:  0  Restlessness:  0  Agitation:  0         Pain Assessment:  OME in 24 hours:  45  Location(s): back    Back       Location: generalized        Quality: Aching and throbbing        Quantity: 0/10 in intensity        Chronicity: Onset 3 week(s) ago, gradually improving        Aggravating Factors: None        Alleviating Factors: Opiates       Associated Symptoms: None    Performance Status:  40    Psychosocial/Cultural:   See Palliative Psychosocial Note: No  Patient is not , no living children.  Her parents are , no living siblings.  Her support system includes her ex-boyfriend/friend, Minor Ragsdaleoctavio Douglas   **Primary  to Follow**  Palliative Care  Consult: No      Advance Care Planning   Advance Directives:     Decision Making:  Patient answered questions  Goals of Care: The patient endorses that what is most important right now is to focus on improvement in condition but with limits to invasive therapies    Accordingly, we have decided that the best plan to meet the patient's goals includes continuing with treatment       Significant Labs: All pertinent labs within the past 24 hours have been  reviewed.  CBC:   Recent Labs   Lab 02/23/23  0359   WBC 0.67*   HGB 6.7*   HCT 21.4*   MCV 92   PLT 38*     BMP:  Recent Labs   Lab 02/23/23  0359      *   K 3.6   CL 94*   CO2 29   BUN 14   CREATININE 0.5   CALCIUM 8.2*   MG 1.9     LFT:  Lab Results   Component Value Date    AST 68 (H) 02/23/2023    GGT 57 (H) 02/07/2022    ALKPHOS 127 02/23/2023    BILITOT 0.8 02/23/2023     Albumin:   Albumin   Date Value Ref Range Status   02/23/2023 2.7 (L) 3.5 - 5.2 g/dL Final     Protein:   Total Protein   Date Value Ref Range Status   02/23/2023 5.0 (L) 6.0 - 8.4 g/dL Final     Lactic acid:   Lab Results   Component Value Date    LACTATE 0.9 02/19/2023    LACTATE 1.2 02/14/2023       Significant Imaging: I have reviewed all pertinent imaging results/findings within the past 24 hours.    IR Paracentesis with Imaging  Narrative: EXAMINATION:  Ultrasound-guided paracentesis    Procedural Personnel    Attending physician(s): Tobias Rascon MD    Fellow physician(s): None    Resident physician(s): None    Advanced practice provider(s): CÉSAR Nelson FNP    Pre-procedure diagnosis: Ascites    Post-procedure diagnosis: Same    Complications: No immediate complications.    CLINICAL HISTORY:  Recurrent Ascites    TECHNIQUE:  - Ultrasound-guided paracentesis    COMPARISON:  Paracentesis 2/13/2023    FINDINGS:  Pre-procedure    Consent: Informed consent for the procedure was obtained and time-out was performed prior to the procedure.    Preparation: The site was prepared and draped using maximal sterile barrier technique including cutaneous antisepsis.    Anesthesia/sedation    Level of anesthesia/sedation: No sedation    Anesthesia/sedation administered by: Not applicable    Total intra-service sedation time (minutes): 0    Limited abdominal ultrasound    Limited abdominal ultrasound was performed.    Moderate ascites. A safe window for paracentesis was identified.    Paracentesis    Local anesthesia was  administered. The peritoneal cavity was accessed on the left lower quadrant and fluid return confirmed position. Ascites was drained.    Paracentesis access technique: Real-time ultrasound guidance    Catheter placed: 5F Yueh    Closure    The catheter was removed. A sterile bandage was applied.    Post-drainage ultrasound: Not performed    Additional Details    Additional description of procedure: None    Equipment details: None    Specimens removed: Abdominal fluid    Estimated blood loss (mL): Less than 10    Standardized report: [email or web address]    Attestation    Signer name: Tobias Rascon MD    I attest that I reviewed the stored images and agree with the report as written.  Impression: Ultrasound-guided paracentesis with drainage of 6675 mL of serous fluid.    _______________________________________________________________    Electronically signed by resident: Rachel Chauhan NP  Date:    02/15/2023  Time:    16:28    Electronically signed by: Tobias Rascon MD  Date:    02/15/2023  Time:    19:07  CT Abdomen Pelvis With Contrast  Narrative: EXAMINATION:  CT ABDOMEN PELVIS WITH CONTRAST    CLINICAL HISTORY:  Nonspecific fluid collection adjacent to the left iliac vein.  Recommend further evaluation with contrast enhanced CT.;    TECHNIQUE:  Low dose axial images, sagittal and coronal reformations were obtained from the lung bases to the pubic symphysis following the IV administration of 100 mL of Omnipaque 350 .  Oral contrast was not given.    COMPARISON:  This CT renal stone study April 2022 and ultrasound lower extremities February 13, 2023    FINDINGS:  In the chest, no significant pleural or pericardial fluid.  There may be trace fluid on the right.  Atelectasis left base.  No confluent consolidation.    In the abdomen, scattered peritoneal fluid.  Liver is small and nodular in contour.  Umbilical vein recanalized.  Gallbladder is been removed.  Pancreas appears unremarkable.  Spleen is enlarged.   SMV, splenic and portal veins are patent.  Majority of the portal venous flow is into the recanalized umbilical vein based on size.  No adrenal mass.  Few mm nonobstructing left renal stones.  No enhancing masses.    Aorta tapers normally.  No para-aortic nor pelvic adenopathy.  Enlarged left femoral and external iliac veins likely related to the collateral venous flow.    Uterus and adnexa unremarkable for age.  Bladder without significant wall thickening.    Evaluation of the bowel demonstrates scattered stool and bowel gas.  Scattered peritoneal fluid.  Appendix appears normal.    Subcutaneous tissues demonstrate significant anasarca.  Collateral vessels noted.  Presumed subcutaneous drains posteriorly.  Some fluid noted along the course of the drains.  Skin staples noted posteriorly.    Evaluation of the bones demonstrate postoperative changes extending from the visualized thoracic spine to the bi iliac region.  There is some lucency about the bi iliac screws.  Osseous destruction T10 with interbody strut noted.  Impression: Findings consistent with cirrhosis.  Splenomegaly, Recanalized umbilical vein, scattered peritoneal fluid, and small nodular liver noted.    Nonobstructing left renal stones.    Postop change in the spine.  There is lucency about the bi iliac screws concerning for loosening.  Bony destruction T10 with interbody strut.    Additional findings above.    Electronically signed by: Celio Zuniga MD  Date:    02/15/2023  Time:    07:51    Results for orders placed or performed during the hospital encounter of 01/19/23   EKG 12-lead    Collection Time: 02/20/23  2:58 PM    Narrative    Test Reason : T50.905A,    Vent. Rate : 102 BPM     Atrial Rate : 102 BPM     P-R Int : 146 ms          QRS Dur : 102 ms      QT Int : 370 ms       P-R-T Axes : -01 -05 037 degrees     QTc Int : 482 ms    Sinus tachycardia  Otherwise normal ECG  When compared with ECG of 08-FEB-2023 23:44,  Questionable change in The  axis  T wave inversion no longer evident in Inferior leads  Confirmed by BETZY POPE, RAUL ([id number]) on 2/21/2023 8:12:02 AM    Referred By: CAMILA LOVE           Confirmed By:RAUL BO MD           50 min spent in advance care planning    Candy Castellon, KADEN  Palliative Medicine  [facility] - Telemetry Stepdown

## 2023-02-23 NOTE — SUBJECTIVE & OBJECTIVE
Hospital problems/plan of care as per primary team:  Weakness of both lower extremities  42 year old woman s/p multiple spinal surgeries presented to OSH with possible infection of shoulder. Found to have UTI and E. Coli bacteremia and progressively worse BLE weakness. Transferred to [facility] [place] for neurosurgical evaluation. Underwent Laminectomy T8-T10; Posterior spinal fusion T8-T12; hardware removal and washout on 1/24. Admitted to [facility] postop. On 2/2 underwent T4-pelvis fusion and T9-T10 corpectomies. To complete 8 week course of meropenem per ID for ESBL E coli from bone culture, end date 3/29.     - neuro checks q4h  - meropenem for ESBL bacteremia per ID, eot 3/29/23  - Plastic surgery following, wound vac discontinued  - CMP, Mag, Phos, CBC daily  - MAP goals >65, SBP < 180  - PRN labetalol, hydralazine  - MM pain regimen: PRN Dilaudid, Tylenol, gabapentin, robaxin, PRN oxycodone  - Aggressive bowel regimen  - PT/OT      Hypotension  -multifactorial in the setting of narcotics, liver failure, recurrent chylous ascites, and intermittent diuresis  - given bolus 500 x1 2/19, lactic acid negative  -continues to remain soft, though improving on albumin  - Will continue to monitor blood pressure trend closely and adjust medication regimen as clinically indicated and tolerated        E coli infection  See weakness of both lower extremities   Meropenem for ESBL bacteremia per ID until 3/29        Thoracic discitis  See Weakness of both lower extremities     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  IR consulted  for lymphangiogram and  eval for  lymphatic repair  2/15  IR eval -repeat paracentesis to trend volume output and  conservative management    spironolactone  increased to 100 mg Qday. Continue IV Lasix. Dietary consulted for  high-protein and low-fat diet with medium-chain triglycerides  diet.      Will continue to monitor for further paracentesis need.  IR deferring  lymphangiogram at this time.     Ascites due to alcoholic cirrhosis  2/10  sono abdomen -Cirrhotic liver morphology.  Sequela of portal hypertension including moderate ascites, recanalized umbilical vein.  No focal hepatic lesions. Cholecystectomy. s/p IR paracentesis as above  2/11no evidence of SBP on paracentesis. 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 AFB and Triglyerides on ascitic fluid.    2/12  hepatology eval -If ascitic fluid triglyceride level is elevated, needs IR  eval for lymphangiogram and surgery eval for  lymphatic repair     Discussed with hepatology, not a liver transplant candidate.  IR deferring lymph angiogram at this time.     Chronic hepatitis C with cirrhosis  See above     Cirrhosis of liver with ascites  MELD-Na score: 11 at 2/22/2023  6:36 AM  MELD score: 11 at 2/22/2023  6:36 AM  Calculated from:  Serum Creatinine: 0.5 mg/dL (Using min of 1 mg/dL) at 2/22/2023  6:36 AM  Serum Sodium: 130 mmol/L at 2/22/2023  6:36 AM  Total Bilirubin: 1.3 mg/dL at 2/22/2023  6:36 AM  INR(ratio): 1.4 at 2/22/2023  6:36 AM  Age: 42 years        Patient has reportedly refused transplant evaluation in the past.   - Daily CMP and trend LFTs  - Continue Lactulose 20 g TID  - Lasix 40 mg PO BID  - Will need Hepatology follow up outpatient  2/10   ammonia at 57 . sono abdomen  . tylenol changed to 1000mg q12h with cirrhosis . s/p IR paracentesis today   2/11no evidence of SBP on paracentesis. 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 AFB and Triglyerides on ascitic fluid.    2/13 s/p paracentesis. Removed 4,700 ml. DVT sonogram LE negative.2/13 s/p paracentesis. Removed 4,700 ml. DVT sonogram LE negative. s/p psychiatry eval  for anxiety/depression. xanax discontinued. Decreased Bupropion to 150 mg PO Daily . Started Duloxetine 30 mg PO BID and  PRN Hydroxyzine 25 mg PO q8h for anxiety      Hyponatremia  - Patient with sodium         Recent Labs   Lab 02/20/23  0351 02/21/23  0436 02/22/23  0636   * 127* 130*   . sodium trending down     2/9 monitor on lasix BID.sodium trended down to 127.   2/10 sodium stable at 127 . continues with increased drain output 985ml/24h . negative balance of  2.8 L lasix on hold.  nephrology consulted.  Nephrology recs  fluid restriction to 1 L,  lasix 60 mg IV daily,  and trend sodium q12h.  diazepam discontinued   2/11  noncompliant with  fluid restriction to 1 L despite counseling. sodium trended down to 126 .received 2 doses of IV lasix 60mg. Increased Lasix IV 80 BID  due to UOP 500ml/24h   2/12 UOP of 1250ml/24h.sodium trended up to 129   2/14 sodium trended up to 130 . Diuresing well with lasix IV .   2/15 started on salt tablets 1g BID and lasix drip 10mg/h x 6h  2/16 continue Lasix 40 IVP x 1 with Lasix drip 10mg/hr x 6   2/17 sodium 134  2/18 sodium 131. On lasix drip . Increased from 5 to 10  2/19 sodium 130, lasix stopped   2/20 Bumex initiated per Nephrology recs  2/21, 2/22 diuresis held and albumin initiated     Acute blood loss anemia  Hb 6.4 on 2/6, received 1U pRBCs. Hb 2/7 WNL.  - monitor Hb  - transfuse to maintain Hb >7  2/9     Patient's with Normocytic anemia.. Hemoglobin stable. Etiology likely due to chronic disease .  Current CBC reviewed-          Recent Labs   Lab 02/19/23  1255 02/20/23  0353 02/21/23  0436   HGB 8.0* 8.3* 8.6*    Monitor CBC and transfuse if H/H drops below 7/21.   2/14 Hb 6.9 Transfusion with 1 unit of PRBC . FOBT negative  2/17: hgb 8.1, stable  2/18: hbg 8.2 stable   2/19 stable , hbg 8     Abdominal pain  2/10  abdominal X ray -  Nonobstructive bowel gas pattern.  No free air.  As before, mild gaseous distension of stomach. ammonia at 57 . sono abdomen with ascitis . simethicone PRN  2/11no evidence of SBP on paracentesis. hepatology consulted for Ultrasound-guided paracentesis with drainage of 5000 mL of chylous  fluid. AFB and Triglyerides on ascitic fluid.       Kyphosis of thoracolumbar region  See weakness     Anxiety and depression  Evaluated by Psychiatry at OSH prior to transfer. Recommended increasing bupropion.  - Continue bupropion 300 mg QD  - Gabapentin 600 mg TID for anxiety and neuropathy  - PRN diazepam 5 mg q6h discontinued  2/13   s/p psychiatry eval  for anxiety/depression. xanax discontinued. Decreased Bupropion to 150 mg PO Daily . Started Duloxetine 30 mg PO BID and  PRN Hydroxyzine 25 mg PO q8h for anxiety     Severe obesity (BMI >= 40)  Body mass index is 48.58 kg/m². Morbid obesity complicates all aspects of disease management from diagnostic modalities to treatment. Weight loss encouraged     Substance use disorder  Denies IV drug use (meth) for past 3 years. Denies alcohol and tobacco abuse.      Pancytopenia  - acute on chronic  - follows with hematology outpatient  - CBC daily      Past Medical History:   Diagnosis Date    Anemia     Anxiety     Depressed     Diskitis     Hep C w/o coma, chronic     IV drug abuse     Kidney stone     Liver cirrhosis        Past Surgical History:   Procedure Laterality Date    ARTHROTOMY OF SHOULDER Left 11/15/2022    Procedure: ARTHROTOMY, SHOULDER;  Surgeon: Bjorn Hayes MD;  Location: [facility];  Service: Orthopedics;  Laterality: Left;  Left acromioclavicular joint arthrotomy    BACK SURGERY      benine tumor removal      forehead, age 9    CHOLECYSTECTOMY      KIDNEY SURGERY      LUMBAR FUSION Bilateral 3/2/2022    Procedure: FUSION, SPINE, LUMBAR;  Surgeon: Guille Templeton MD;  Location: [address];  Service: Neurosurgery;  Laterality: Bilateral;  AIRO  T10--Pelvis    LUMBAR FUSION N/A 1/24/2023    Procedure: **AIRO** T8-T12 POSTERIOR FUSION, T8-T10 LAMINECTOMY, HARDWARE REMOVAL AND WASHOUT;  Surgeon: Guille Templeton MD;  Location: [address];  Service: Neurosurgery;  Laterality: N/A;    REMOVAL OF HARDWARE FROM SPINE N/A 2/21/2022    Procedure:  REMOVAL, HARDWARE, SPINE;  Surgeon: Guille Templeton MD;  Location: [facility] OR 2ND FLR;  Service: Neurosurgery;  Laterality: N/A;  [place]    SPINE SURGERY      THORACIC LAMINECTOMY WITH FUSION N/A 2/2/2023    Procedure: LAMINECTOMY, SPINE, THORACIC, WITH FUSION (T4-Pelvis fusion w/ T9-10 corpectomies) **AIRO;  Surgeon: Guille Templeton MD;  Location: [facility] OR 2ND FLR;  Service: Neurosurgery;  Laterality: N/A;  AIRO, 2 bovies, aquamantys, Globus, Depuy, antibiotic beads, TXA, Peroxide; [employer] plastics closure       Review of patient's allergies indicates:   Allergen Reactions    Bee venom protein (honey bee) Anaphylaxis     Patient reports she is allergic to bee stings.    Naproxen Anaphylaxis     Throat closing    12-23- Patient reports taking Ibuprofen 200 mg at home without problems. Verified X3. [place]    Wasp sting [allergen ext-venom-honey bee] Anaphylaxis    Adhesive Blisters    Shellfish containing products     Iodine and iodide containing products Hives and Itching     Allergic to iodine in seafood only    Nuts [tree nut] Rash       Medications:  Continuous Infusions:  Scheduled Meds:   acetaminophen  1,000 mg Oral Q12H    bisacodyL  10 mg Rectal Every other day    buPROPion  150 mg Oral Daily    DULoxetine  30 mg Oral BID    gabapentin  900 mg Oral TID    heparin (porcine)  5,000 Units Subcutaneous Q8H    lactulose  20 g Oral TID    meropenem (MERREM) IVPB  2 g Intravenous Q8H    methocarbamoL  1,000 mg Oral Q6H    pantoprazole  40 mg Oral Daily    sodium chloride 0.9%  10 mL Intravenous Q6H     PRN Meds:sodium chloride, bumetanide, calcium carbonate, dextrose 10%, dextrose 10%, diphenhydrAMINE, glucagon (human recombinant), glucose, glucose, hydrALAZINE, HYDROmorphone, hydrOXYzine HCL, melatonin, naloxone, ondansetron, oxyCODONE **AND** oxyCODONE, polyethylene glycol, predniSONE, prochlorperazine, senna-docusate 8.6-50 mg, simethicone, Flushing PICC Protocol **AND** sodium chloride 0.9% **AND** sodium chloride  0.9%    Family History       Problem Relation (Age of Onset)    Cirrhosis Father    Drug abuse Mother, Father    Hepatitis Mother, Father    Liver cancer Mother          Tobacco Use    Smoking status: Some Days     Packs/day: 0.50     Years: 23.00     Pack years: 11.50     Types: Cigarettes    Smokeless tobacco: Never    Tobacco comments:     patient states she knows she nees to quit.   Substance and Sexual Activity    Alcohol use: Not Currently     Comment: quit 2014    Drug use: Yes     Frequency: 4.0 times per week     Types: Marijuana     Comment: Patient denies needle use, only inhalation of methampehtamines or orally.  Last known drug use was prior to admission to hospital stay for surgery    Sexual activity: Yes     Partners: Male     Birth control/protection: None       Review of Systems   Constitutional:  Positive for activity change and fatigue.   HENT:  Negative for congestion and rhinorrhea.    Eyes:  Negative for discharge and itching.   Respiratory:  Negative for cough.    Cardiovascular:  Negative for chest pain.   Gastrointestinal:  Negative for diarrhea and nausea.   Endocrine: Negative for cold intolerance and heat intolerance.   Genitourinary:  Negative for flank pain.   Musculoskeletal:  Positive for back pain.   Skin:  Negative for rash.   Allergic/Immunologic: Negative for environmental allergies.   Neurological:  Negative for seizures and speech difficulty.   Psychiatric/Behavioral:  Negative for behavioral problems and confusion.    Objective:     Vital Signs (Most Recent):  Temp: (P) 98.3 °F (36.8 °C) (02/23/23 1607)  Pulse: (P) 97 (02/23/23 1607)  Resp: (P) 18 (02/23/23 1607)  BP: (!) (P) 108/55 (02/23/23 1607)  SpO2: (P) 99 % (02/23/23 1607)   Vital Signs (24h Range):  Temp:  [98 °F (36.7 °C)-98.8 °F (37.1 °C)] (P) 98.3 °F (36.8 °C)  Pulse:  [] (P) 97  Resp:  [17-18] (P) 18  SpO2:  [96 %-100 %] (P) 99 %  BP: ()/(50-62) (P) 108/55     Weight: (!) 142 kg (313 lb 0.9 oz)  Body  mass index is 49.03 kg/m².    Physical Exam  Vitals and nursing note reviewed.   Constitutional:       General: She is not in acute distress.     Appearance: She is obese. She is ill-appearing.   HENT:      Head: Normocephalic and atraumatic.      Nose: Nose normal. No congestion or rhinorrhea.   Eyes:      General:         Right eye: No discharge.         Left eye: No discharge.   Cardiovascular:      Rate and Rhythm: Normal rate.   Pulmonary:      Effort: Pulmonary effort is normal. No respiratory distress.   Abdominal:      General: There is distension.   Skin:     General: Skin is warm and dry.   Neurological:      Mental Status: She is oriented to person, place, and time.   Psychiatric:         Mood and Affect: Mood normal.         Behavior: Behavior normal.         Thought Content: Thought content normal.         Judgment: Judgment normal.       Review of Symptoms      Symptom Assessment (ESAS 0-10 Scale)  Pain:  0  Dyspnea:  0  Anxiety:  0  Nausea:  0  Depression:  0  Anorexia:  0  Fatigue:  0  Insomnia:  0  Restlessness:  0  Agitation:  0         Pain Assessment:  OME in 24 hours:  45  Location(s): back    Back       Location: generalized        Quality: Aching and throbbing        Quantity: 0/10 in intensity        Chronicity: Onset 3 week(s) ago, gradually improving        Aggravating Factors: None        Alleviating Factors: Opiates       Associated Symptoms: None    Performance Status:  40    Psychosocial/Cultural:   See Palliative Psychosocial Note: No  Patient is not , no living children.  Her parents are , no living siblings.  Her support system includes her ex-boyfriend/friend, Minor Gavinalee Douglas   **Primary  to Follow**  Palliative Care  Consult: No      Advance Care Planning   Advance Directives:     Decision Making:  Patient answered questions  Goals of Care: The patient endorses that what is most important right now is to focus on improvement in condition but  with limits to invasive therapies    Accordingly, we have decided that the best plan to meet the patient's goals includes continuing with treatment       Significant Labs: All pertinent labs within the past 24 hours have been reviewed.  CBC:   Recent Labs   Lab 02/23/23 0359   WBC 0.67*   HGB 6.7*   HCT 21.4*   MCV 92   PLT 38*     BMP:  Recent Labs   Lab 02/23/23  0359      *   K 3.6   CL 94*   CO2 29   BUN 14   CREATININE 0.5   CALCIUM 8.2*   MG 1.9     LFT:  Lab Results   Component Value Date    AST 68 (H) 02/23/2023    GGT 57 (H) 02/07/2022    ALKPHOS 127 02/23/2023    BILITOT 0.8 02/23/2023     Albumin:   Albumin   Date Value Ref Range Status   02/23/2023 2.7 (L) 3.5 - 5.2 g/dL Final     Protein:   Total Protein   Date Value Ref Range Status   02/23/2023 5.0 (L) 6.0 - 8.4 g/dL Final     Lactic acid:   Lab Results   Component Value Date    LACTATE 0.9 02/19/2023    LACTATE 1.2 02/14/2023       Significant Imaging: I have reviewed all pertinent imaging results/findings within the past 24 hours.    IR Paracentesis with Imaging  Narrative: EXAMINATION:  Ultrasound-guided paracentesis    Procedural Personnel    Attending physician(s): Tobias Rascon MD    Fellow physician(s): None    Resident physician(s): None    Advanced practice provider(s): CÉSAR Nelson FNP    Pre-procedure diagnosis: Ascites    Post-procedure diagnosis: Same    Complications: No immediate complications.    CLINICAL HISTORY:  Recurrent Ascites    TECHNIQUE:  - Ultrasound-guided paracentesis    COMPARISON:  Paracentesis 2/13/2023    FINDINGS:  Pre-procedure    Consent: Informed consent for the procedure was obtained and time-out was performed prior to the procedure.    Preparation: The site was prepared and draped using maximal sterile barrier technique including cutaneous antisepsis.    Anesthesia/sedation    Level of anesthesia/sedation: No sedation    Anesthesia/sedation administered by: Not applicable    Total  intra-service sedation time (minutes): 0    Limited abdominal ultrasound    Limited abdominal ultrasound was performed.    Moderate ascites. A safe window for paracentesis was identified.    Paracentesis    Local anesthesia was administered. The peritoneal cavity was accessed on the left lower quadrant and fluid return confirmed position. Ascites was drained.    Paracentesis access technique: Real-time ultrasound guidance    Catheter placed: 5F Yueh    Closure    The catheter was removed. A sterile bandage was applied.    Post-drainage ultrasound: Not performed    Additional Details    Additional description of procedure: None    Equipment details: None    Specimens removed: Abdominal fluid    Estimated blood loss (mL): Less than 10    Standardized report: [email or web address]    Attestation    Signer name: Tobias Rascon MD    I attest that I reviewed the stored images and agree with the report as written.  Impression: Ultrasound-guided paracentesis with drainage of 6675 mL of serous fluid.    _______________________________________________________________    Electronically signed by resident: Rachel Chauhan NP  Date:    02/15/2023  Time:    16:28    Electronically signed by: Tobias Rascon MD  Date:    02/15/2023  Time:    19:07  CT Abdomen Pelvis With Contrast  Narrative: EXAMINATION:  CT ABDOMEN PELVIS WITH CONTRAST    CLINICAL HISTORY:  Nonspecific fluid collection adjacent to the left iliac vein.  Recommend further evaluation with contrast enhanced CT.;    TECHNIQUE:  Low dose axial images, sagittal and coronal reformations were obtained from the lung bases to the pubic symphysis following the IV administration of 100 mL of Omnipaque 350 .  Oral contrast was not given.    COMPARISON:  This CT renal stone study April 2022 and ultrasound lower extremities February 13, 2023    FINDINGS:  In the chest, no significant pleural or pericardial fluid.  There may be trace fluid on the right.  Atelectasis left base.   No confluent consolidation.    In the abdomen, scattered peritoneal fluid.  Liver is small and nodular in contour.  Umbilical vein recanalized.  Gallbladder is been removed.  Pancreas appears unremarkable.  Spleen is enlarged.  SMV, splenic and portal veins are patent.  Majority of the portal venous flow is into the recanalized umbilical vein based on size.  No adrenal mass.  Few mm nonobstructing left renal stones.  No enhancing masses.    Aorta tapers normally.  No para-aortic nor pelvic adenopathy.  Enlarged left femoral and external iliac veins likely related to the collateral venous flow.    Uterus and adnexa unremarkable for age.  Bladder without significant wall thickening.    Evaluation of the bowel demonstrates scattered stool and bowel gas.  Scattered peritoneal fluid.  Appendix appears normal.    Subcutaneous tissues demonstrate significant anasarca.  Collateral vessels noted.  Presumed subcutaneous drains posteriorly.  Some fluid noted along the course of the drains.  Skin staples noted posteriorly.    Evaluation of the bones demonstrate postoperative changes extending from the visualized thoracic spine to the bi iliac region.  There is some lucency about the bi iliac screws.  Osseous destruction T10 with interbody strut noted.  Impression: Findings consistent with cirrhosis.  Splenomegaly, Recanalized umbilical vein, scattered peritoneal fluid, and small nodular liver noted.    Nonobstructing left renal stones.    Postop change in the spine.  There is lucency about the bi iliac screws concerning for loosening.  Bony destruction T10 with interbody strut.    Additional findings above.    Electronically signed by: Celio Zuniga MD  Date:    02/15/2023  Time:    07:51    Results for orders placed or performed during the hospital encounter of 01/19/23   EKG 12-lead    Collection Time: 02/20/23  2:58 PM    Narrative    Test Reason : T50.905A,    Vent. Rate : 102 BPM     Atrial Rate : 102 BPM     P-R Int : 146  ms          QRS Dur : 102 ms      QT Int : 370 ms       P-R-T Axes : -01 -05 037 degrees     QTc Int : 482 ms    Sinus tachycardia  Otherwise normal ECG  When compared with ECG of 08-FEB-2023 23:44,  Questionable change in The axis  T wave inversion no longer evident in Inferior leads  Confirmed by RAUL BO MD ([id number]) on 2/21/2023 8:12:02 AM    Referred By: CAMILA LOVE           Confirmed By:RAUL BO MD

## 2023-02-23 NOTE — PT/OT/SLP PROGRESS
Occupational Therapy   Co-Treatment w/ PTA    Name: Rachel Zazueta  MRN: [id number]  Admitting Diagnosis:  Weakness of both lower extremities  21 Days Post-Op    Co-treat performed due to acuity and complexity of pt's medical status with 2 skilled disciplines needed to optimize pts occupational performance and  decreased activity tolerance. ]  Recommendations:     Discharge Recommendations: nursing facility, skilled  Discharge Equipment Recommendations:  hospital bed, lift device, wheelchair  Barriers to discharge:  Decreased caregiver support, Inaccessible home environment    Assessment:     Rachel Zazueta is a 42 y.o. female with a medical diagnosis of Weakness of both lower extremities.  Pt pleasant and agreeable to therapy session this date.  RN at bedside obtaining BP, with Pt's BP too low for pain medicine prior to therapy session. Therapy session focused on increased bilateral coordination and movement during a self care activity while Pt was seated EOB. Pt requires increased time for ADL performance 2/2 to generalized back pain and poor endurance.  Pt's impaired endurance and pain limits occupational performance at this time. She presents with deficits listed below. Performance deficits affecting function are weakness, impaired endurance, impaired self care skills, impaired functional mobility, impaired balance, orthopedic precautions, gait instability, decreased safety awareness, decreased ROM, decreased upper extremity function, decreased lower extremity function.     Rehab Prognosis:  Good; patient would benefit from acute skilled OT services to address these deficits and reach maximum level of function.       Plan:     Patient to be seen 3 x/week to address the above listed problems via self-care/home management, therapeutic activities, therapeutic exercises, neuromuscular re-education  Plan of Care Expires: 02/20/23  Plan of Care Reviewed with: patient    Subjective     Pain/Comfort:  Pain Rating  1: 9/10  Location - Side 1: Bilateral  Location - Orientation 1: generalized  Location 1: back  Pain Addressed 1: Distraction, Reposition, Nurse notified  Pain Rating Post-Intervention 1: 9/10    Objective:     Communicated with: Nurse Esther prior to session.  Patient found supine with MIHIR drain, PureWick, telemetry upon OT entry to room.    General Precautions: Standard, fall, contact    Orthopedic Precautions:spinal precautions  Braces: TLSO  Respiratory Status: Room air     Occupational Performance:     Bed Mobility:    Patient completed Rolling/Turning to Left with  maximal assistance  Patient completed Supine to Sit with maximal assistance and 2 persons  Patient completed Sit to Supine with maximal assistance and 2 persons     Functional Mobility/Transfers:  Patient completed Sit <> Stand Transfer with maximal assistance and of 2 persons  with  hand-held assist  x2 trials  Functional Mobility: deferred 2/2 to standing difficulty    Activities of Daily Living:  Grooming: stand by assistance and maximal assistance while seated at EOB. Pt required MAX A for back of head, but required SBA- Min A for front of head 2/2 to limited ROM and fatigue with BUE movement.   Lower Body Dressing: total assistance to [place] non slip socks      [facility] 6 Click ADL: 12    Treatment & Education:  Role of OT and OT POC  Fall Prevention while seated EOB, educated on proper trunk position to improve sitting posture and reduce risk of falls.     Patient left supine with all lines intact and call button in reach    GOALS:   Multidisciplinary Problems       Occupational Therapy Goals          Problem: Occupational Therapy    Goal Priority Disciplines Outcome Interventions   Occupational Therapy Goal     OT, PT/OT Ongoing, Progressing    Description: Goals to be met by: 2/24/2023     Patient will increase functional independence with ADLs by performing:    UE Dressing with Moderate Assistance.  Grooming while seated with Minimal Assistance.  - Met  Sitting at edge of bed x15 minutes with Minimal Assistance. -ongoing  Rolling to Bilateral with Minimal Assistance.   Supine to sit with Moderate Assistance.                         Time Tracking:     OT Date of Treatment: 02/23/23  OT Start Time: 0825  OT Stop Time: 0919  OT Total Time (min): 54 min    Billable Minutes:Self Care/Home Management 40  Therapeutic Activity 14    OT/ROSS: OT          2/23/2023

## 2023-02-23 NOTE — PLAN OF CARE
Recommendations     1. Liberalize diet to Low Na to promote PO intake; continue ONS.   2. RD to monitor & follow-up.     Goals: Continue meeting % EEN/EPN by next RD f/u.  Nutrition Goal Status: progressing towards goal  Communication of RD Recs:  (POC)

## 2023-02-23 NOTE — SUBJECTIVE & OBJECTIVE
Interval History: no acute events overnight. Discussed with hepatology, she is not a transplant candidate at this time. Reinforced that she should keep her current fluid restriction to assist with maintaining her level of hyponatremia, to not exacerbate it any further    Review of patient's allergies indicates:   Allergen Reactions    Bee venom protein (honey bee) Anaphylaxis     Patient reports she is allergic to bee stings.    Naproxen Anaphylaxis     Throat closing    12-23- Patient reports taking Ibuprofen 200 mg at home without problems. Verified X3. [place]    Wasp sting [allergen ext-venom-honey bee] Anaphylaxis    Adhesive Blisters    Shellfish containing products     Iodine and iodide containing products Hives and Itching     Allergic to iodine in seafood only    Nuts [tree nut] Rash     Current Facility-Administered Medications   Medication Frequency    acetaminophen tablet 1,000 mg Q12H    albumin human 25% bottle 25 g Q8H    bisacodyL suppository 10 mg Every other day    buPROPion TB24 tablet 150 mg Daily    calcium carbonate 200 mg calcium (500 mg) chewable tablet 1,000 mg TID PRN    dextrose 10% bolus 125 mL 125 mL PRN    dextrose 10% bolus 250 mL 250 mL PRN    diphenhydrAMINE capsule 25 mg On Call Procedure    DULoxetine DR capsule 30 mg BID    gabapentin capsule 900 mg TID    glucagon (human recombinant) injection 1 mg PRN    glucose chewable tablet 16 g PRN    glucose chewable tablet 24 g PRN    heparin (porcine) injection 5,000 Units Q8H    hydrALAZINE tablet 25 mg Q8H PRN    HYDROmorphone injection 0.5 mg Q4H PRN    hydrOXYzine HCL tablet 25 mg TID PRN    lactulose 20 gram/30 mL solution Soln 20 g TID    melatonin tablet 6 mg Nightly PRN    meropenem (MERREM) 2 g in sodium chloride 0.9% 100 mL IVPB Q8H    methocarbamoL tablet 1,000 mg Q6H    naloxone 0.4 mg/mL injection 0.02 mg PRN    ondansetron injection 4 mg Q6H PRN    oxyCODONE immediate release tablet 5 mg Q3H PRN    And    oxyCODONE immediate  release tablet Tab 10 mg Q3H PRN    pantoprazole EC tablet 40 mg Daily    polyethylene glycol packet 17 g Daily PRN    predniSONE tablet 50 mg On Call Procedure    prochlorperazine injection Soln 2.5 mg Q6H PRN    senna-docusate 8.6-50 mg per tablet 1 tablet BID PRN    simethicone chewable tablet 80 mg TID PRN    sodium chloride 0.9% flush 10 mL Q6H    And    sodium chloride 0.9% flush 10 mL PRN       Objective:     Vital Signs (Most Recent):  Temp: 98.2 °F (36.8 °C) (02/23/23 0830)  Pulse: 106 (02/23/23 1102)  Resp: 17 (02/23/23 0830)  BP: (!) 99/58 (02/23/23 0830)  SpO2: 97 % (02/23/23 0830)   Vital Signs (24h Range):  Temp:  [98.1 °F (36.7 °C)-98.4 °F (36.9 °C)] 98.2 °F (36.8 °C)  Pulse:  [] 106  Resp:  [17-18] 17  SpO2:  [97 %-99 %] 97 %  BP: ()/(50-60) 99/58     Weight: (!) 142 kg (313 lb) (02/20/23 0556)  Body mass index is 49.02 kg/m².  Body surface area is 2.59 meters squared.    I/O last 3 completed shifts:  In: -   Out: 1565 [Urine:950; Drains:615]    Physical Exam  Constitutional:       General: She is not in acute distress.     Appearance: Normal appearance. She is obese. She is not ill-appearing.   HENT:      Head: Normocephalic and atraumatic.   Eyes:      General: No scleral icterus.  Cardiovascular:      Rate and Rhythm: Normal rate.      Pulses: Normal pulses.   Pulmonary:      Effort: Pulmonary effort is normal.   Abdominal:      General: There is distension.      Tenderness: There is abdominal tenderness. There is no guarding.   Musculoskeletal:      Right lower leg: Edema present.      Left lower leg: Edema present.   Skin:     General: Skin is warm and dry.      Coloration: Skin is not jaundiced.   Neurological:      Mental Status: She is alert and oriented to person, place, and time.       Significant Labs:  All labs within the past 24 hours have been reviewed.     Significant Imaging:  Labs: Reviewed

## 2023-02-23 NOTE — PROGRESS NOTES
"[facility] - Telemetry Stepdown  Adult Nutrition  Progress Note    SUMMARY       Recommendations    1. Liberalize diet to Low Na to promote PO intake; continue ONS.   2. RD to monitor & follow-up.    Goals: Continue meeting % EEN/EPN by next RD f/u.  Nutrition Goal Status: progressing towards goal  Communication of RD Recs:  (POC)    Assessment and Plan    Nutrition Problem:  Inadequate energy intake    Related to (etiology):   Decreased appetite     Signs and Symptoms (as evidenced by):   Poor PO intake    Interventions(treatment strategy):  Collaboration of nutrition care w/ other providers  ONS    Nutrition Diagnosis Status:   New    Reason for Assessment    Reason For Assessment: RD follow-up  Diagnosis:  (Weakness of BLEs)  Relevant Medical History: Cirrhosis of liver with ascites, hep C, substance use disorder (IV drug), spinal fusion (4/21)  Interdisciplinary Rounds: did not attend    General Information Comments: Pt w/ decreased appetite; ONS ordered. 21 days post-op laminectomy. At initial RD assessment, pt reported good appetite PTA & UBW of 280# (weight gain 2/2 fluid - +3 edema). Appears nourished w/ no indicators of malnutrition.   Nutrition Discharge Planning: Adequate PO intake    Nutrition/Diet History    Food Preferences: -  Spiritual, Cultural Beliefs, Hindu Practices, Values that Affect Care: no  Food Allergies: shellfish, other (see comments) (Nuts)  Factors Affecting Nutritional Intake: None identified at this time    Anthropometrics    Temp: 98.8 °F (37.1 °C)  Height Method: Stated  Height: 5' 7" (170.2 cm)  Height (inches): 67 in  Weight Method: Bed Scale  Weight: (!) 142 kg (313 lb 0.9 oz)  Weight (lb): (!) 313.06 lb  Ideal Body Weight (IBW), Female: 135 lb  % Ideal Body Weight, Female (lb): 231.9 %  BMI (Calculated): 49  BMI Grade: greater than 40 - morbid obesity    Lab/Procedures/Meds    Pertinent Labs Reviewed: reviewed  Pertinent Labs Comments: Na 130  Pertinent Medications " Reviewed: reviewed  Pertinent Medications Comments: Lactulose    Estimated/Assessed Needs    Weight Used For Calorie Calculations: (!) 142 kg (313 lb 0.9 oz)    Energy Calorie Requirements (kcal): 2113 kcal/d  Energy Need Method: Jones-St Jeor (1.0 PAL)    Protein Requirements: 114 g/d (.8 g/kg)  Weight Used For Protein Calculations: (!) 142 kg (313 lb 0.9 oz)    Estimated Fluid Requirement Method: RDA Method  RDA Method (mL): 2113    Nutrition Prescription Ordered    Current Diet Order: Low Na, Low fat, High protein  Nutrition Order Comments: 1000 mL FR  Oral Nutrition Supplement: Boost Plus TID    Evaluation of Received Nutrient/Fluid Intake    I/O: -34.7L since 2/9    Comments: LBM: 2/22    Tolerance: tolerating    Nutrition Risk    Level of Risk/Frequency of Follow-up:  (1 time/week)     Monitor and Evaluation    Food and Nutrient Intake: energy intake, food and beverage intake  Food and Nutrient Adminstration: diet order  Knowledge/Beliefs/Attitudes: food and nutrition knowledge/skill, beliefs and attitudes  Physical Activity and Function: nutrition-related ADLs and IADLs  Anthropometric Measurements: weight, height/length, weight change, body mass index  Biochemical Data, Medical Tests and Procedures: gastrointestinal profile, electrolyte and renal panel, glucose/endocrine profile, lipid profile, inflammatory profile  Nutrition-Focused Physical Findings: overall appearance     Nutrition Follow-Up    RD Follow-up?: Yes

## 2023-02-23 NOTE — HPI
HPI per chart review: Ms. Zazueta is a 42 y.o F w/ hx ofIV drug use (methamphetamine), chronic HCV with cirrhosis, spinal fusion (04/2021), epidural abscess with removal of hardware (02/2022), spinal fusion with hardware (T10 - Pelvis / 03/2022), obesity (BMI 39.3) and neurogenic bladder who initially presented to [facility] for evaluation of back pain and left leg weakness.  She reported initially noting the left leg weakness when she was about to go to the bathroom and was about to fall but was assisted by her boyfriend.  She was brought to the ED via EMS.  Urinalysis with UTI concerns and blood cultures at OSH grew ESBL E coli so she was treated with meropenem.  During hospitalization, she reports the weakness that started out in her left leg initially then spread upwards to her left thigh, left hip, then crossed over to the right hip and spread to her right leg.  Denies recent IV drug use. She reported her last incidence of drug use was more than 3 years ago and also denies tobacco, and alcohol abuse. Reported shortness of breath when sitting up, fever, chills, back pain, lower extremity weakness(initally L>R, but now R>L), diarrhea.  Denies cough, nausea, vomiting, constipation, bladder or bowel incontinence, dysuria, dark urine, new vision changes.OSH course notable for concerning urinalysis and blood cultures positive for ESBL E coli treated with meropenem.  She was also admit to the ICU where she was treated with Precedex for significant body aches, irritability, and muscle spasms.  Concerns of a murmur on physical exam so she underwent TTE which did not show any vegetations.  Worsening lower extremity weakness workup with MRI head nonspecific, MRI cervical spine with multilevel spondylosis, X-ray spine with multilevel thoracolumbar fusion and diskectomy, focal kyphosis at T9-10 increased compared to prior.  She was started on prophylaxis amoxicillin due to her history of infected hardware.  MRI  spine unable to be completed due to patient's body habitus so she was transferred to [facility] for further imaging and Neurosurgery, Neurology evaluation.        Overview/Hospital Course:  Pt admitted as a transfer from [facility] for worsening LE weakness, concern for spinal infection, and need for MRI which could not be done at OSH. Imaging was completed here. Worsening proximal junctional kyphosis noted at T9-10 noted with concern for discitis at T8-9, T9-10. NSGY evaluated.      Found to have T8-T10 kyphosis on MRI. 1/24 underwent laminectomy T8-T10; posterior fusion T8-T12; and washout. 2/2 underwent T4-pelvis fusion and T9-T10 corpectomy.  ID consulted for thoracic discitis infection.  Patient to complete 8 weeks of therapy with meropenem. Patient underwent flap closure with Plastic surgery.  [facility] on 02/08.     2/9   On meropenem for MDR-ESCHERICHIA COLI ESBL  sodium trended down to 129.  Neurosurgery following post OR and aiding in pain management. PCA discontinued 2/8 . On oxycodone 10mg PO q4h . requiring IV dilaudid q4 as needed,. drains to gravity output 990ml/24h . Plastic surgery following flap, managing wounds.  Wound VAC discontinued on Wednesday.  accepted to [facility] pain management consulted for back ache- switched to multimodal therapy. sodium trended down to 127.   2/10 sodium stable at 127 . continues with increased drain output 985ml/24h . negative balance of  2.8 L lasix on hold.  nephrology consulted. abdominal X ray -  Nonobstructive bowel gas pattern.  No free air.  As before, mild gaseous distension of stomach. ammonia at 57 . sono abdomen  . tylenol changed to 1000mg q12h with cirrhosis . sono abdomen -Cirrhotic liver morphology.  Sequela of portal hypertension including moderate ascites, recanalized umbilical vein.  No focal hepatic lesions. Cholecystectomy. . Nephrology recs  fluid restriction to 1 L,  lasix 60 mg IV daily,  and trend sodium q12h.  diazepam discontinued .  s/p IR paracentesis today   2/11no evidence of SBP on paracentesis. 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 AFB and Triglyerides on ascitic fluid.  noncompliant with  fluid restriction to 1 L despite counseling. sodium trended down to 126 .received 2 doses of IV lasix 60mg. drain output 250ml. Increased Lasix IV 80 BID  due to UOP 500ml/24h   2/12 UOP of 1250ml/24h. K and P replaced. sodium trended up to 129.  hepatology eval -If ascitic fluid triglyceride level is elevated, needs IR  eval for lymphangiogram and surgery eval for  lymphatic repair. xanax 0.25mg BID PRN for anxiety   2/13 s/p paracentesis. Removed 4,700 ml. DVT sonogram LE negative. s/p psychiatry eval  for anxiety/depression. xanax discontinued. Decreased Bupropion to 150 mg PO Daily . Started Duloxetine 30 mg PO BID and  PRN Hydroxyzine 25 mg PO q8h for anxiety  2/14 Hb 6.9 Transfusion with 1 unit of PRBC . FOBT. sodium trended up to 130 . Diuresing well with lasix IV . DVT sonogram - Nonspecific fluid collection adjacent to the left iliac vein.  Recommend further evaluation with contrast enhanced CT scan . has Iodine contrast allergy- . 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  Diuresing well on IV lasix BID. negative balance of  13.6 L . IR eval -repeat paracentesis to trend volume output and  conservative management   with a low fat diet, octreotide, diuretics. Neurosurgery for input regarding chylous ascitis as possible neurosurgical complication or not. CT abdomen done overnight as well - Postop change in the spine.  There is lucency about the bi iliac screws concerning for loosening.  Bony destruction T10 with interbody strut. started on salt tablets 1g BID x 1day and lasix drip 10mg/h x 6h .  spironolactone  increased to 100 mg Qday.  Continue IV Lasix. Dietary consulted for  high-protein and low-fat diet with medium-chain triglycerides  diet.  paracentesis 6.6L removed   2/16 Continuing gentle IV lasix with assistance from nephrology, remains fluid overloaded  2/17: continue Lasix infusion, responding well  2/18-2/19:  Please see progress notes from those dates.     2/20: D/w nephrology to transition from IV Lasix to IV bumex for further diuresis, though Na likely optimized as much as possible. D/w NSGY re xray prior to d/c ( sitting upright in the stretcher is fine.) D/w hepatology: IR wanting to defer lymphangiogram at this time.   2/21:  Discussed with plastic surgery; goal for each drain 30 cc or less daily  2/22:  Discussed with Nephrology, will continue albumin infusions at this time.  However, long-term goals unclear as unable to sustain with albumin or even pressor support if stepped up to ICU.  Discussed in depth with Nephrology and hepatology.  Per hepatology, Discussed with Dr Santiago. Not a tx candidate.     Palliative medicine was consulted for goals of care discussions/advance care planning.

## 2023-02-23 NOTE — PT/OT/SLP PROGRESS
Physical Therapy Treatment  -cotx with OT    Patient Name:  Rachel Zazueta   MRN:  [id number]    Recommendations:     Discharge Recommendations: nursing facility, skilled  Discharge Equipment Recommendations: hospital bed, lift device, wheelchair  Barriers to discharge: Inaccessible home, Decreased caregiver support, and impaired functional mobility requiring increased assistance    Assessment:     Rachel Zazueta is a 42 y.o. female admitted with a medical diagnosis of Weakness of both lower extremities.  She presents with the following impairments/functional limitations: weakness, impaired endurance, impaired self care skills, impaired functional mobility, gait instability, impaired balance, decreased upper extremity function, decreased lower extremity function, decreased safety awareness, decreased ROM, decreased coordination, pain, edema, orthopedic precautions.    Pt tolerates session well with focus on bed mobility, transfers, therex, and EOB balance/endurance. Pt progressing slowly with continued difficulty with sitting balance, posture at EOB, and pt unable to reach full upright stand this day. Pt continues to require significant physical assistance to mobilize but remains highly motivated to participate. Pt progressing slowly towards therapy goals. Pt will continue to benefit from therapy services to address impairments listed above.     Rehab Prognosis: Good; patient would benefit from acute skilled PT services to address these deficits and reach maximum level of function.    Recent Surgery: Procedure(s) (LRB):  LAMINECTOMY, SPINE, THORACIC, WITH FUSION (T4-Pelvis fusion w/ T9-10 corpectomies) **AIRO (N/A) 21 Days Post-Op    Plan:     During this hospitalization, patient to be seen 3 x/week to address the identified rehab impairments via therapeutic activities, therapeutic exercises, neuromuscular re-education and progress toward the following goals:    Plan of Care Expires:  03/05/23    Subjective  "    Chief Complaint: pain  Patient/Family Comments/goals: "I haven't had pain medicine in 2 days."  Pain/Comfort:  Pain Rating 1: 9/10  Location - Orientation 1: generalized  Location 1: back  Pain Addressed 1: Reposition, Distraction, Nurse notified (RN at bedside pts blood pressure continues to be too low for her to receive pain medication)  Pain Rating Post-Intervention 1: 9/10      Objective:     Communicated with RN prior to session.  Patient found  supine, HOB minimally elevated  with telemetry, PureWick, MIHIR drain upon PTA entry to room.     General Precautions: Standard, fall, contact  Orthopedic Precautions: spinal precautions  Braces: TLSO  Respiratory Status: Room air     Functional Mobility:  Bed Mobility:     Rolling Left:  maximal assistance  Supine to Sit: maximal assistance and of 2 persons  Sit to Supine: maximal assistance and of 2 persons  Transfers:     Sit to Stand:  maximal assistance and of 2 persons with hand-held assist; x 2 attempts; 2 partial stands with pt able to clear buttocks but unable to extend hips to complete full upright stance. B knee blocking required.   Gait: remains unable to advance to gait       AM-PAC 6 CLICK MOBILITY  Turning over in bed (including adjusting bedclothes, sheets and blankets)?: 2  Sitting down on and standing up from a chair with arms (e.g., wheelchair, bedside commode, etc.): 2  Moving from lying on back to sitting on the side of the bed?: 2  Moving to and from a bed to a chair (including a wheelchair)?: 1  Need to walk in hospital room?: 1  Climbing 3-5 steps with a railing?: 1  Basic Mobility Total Score: 9       Treatment & Education:  Pt assisted at trunk with cues for core/trunk activation for upright posture and midline balance. Additional cues for upright posture, slight lean posteriorly as pt with tendency to rest forward against her body habitus. CGA to Mod A dependent on level of UE support available. Pt engaged with various BUE support while " performing ADLs with   Increased time spent in encouragement and therapeutic listening to improve patients comfort and participation in therapy interventions d/t pain and anxiety.     Patient left HOB elevated with all lines intact, call button in reach, and RN notified.    GOALS:   Multidisciplinary Problems       Physical Therapy Goals          Problem: Physical Therapy    Goal Priority Disciplines Outcome Goal Variances Interventions   Physical Therapy Goal     PT, PT/OT Ongoing, Progressing     Description: Goals to be met by: 23, extended goals to      Patient will increase functional independence with mobility by performin. Supine to sit with Moderate Assistance  2. Sit to supine with Moderate Assistance  3. Rolling to Left and Right with Moderate Assistance.  4. Sit to stand transfer with Maximum Assistance  5. Sit edge of bed with ANTONIO UE support for 8 min with contact guard assist to prepare for functional activities in sitting.                          Time Tracking:     PT Received On: 23  PT Start Time: 825     PT Stop Time: 919  PT Total Time (min): 54 min     Billable Minutes: Therapeutic Activity 39 and Neuromuscular Re-education 15    Treatment Type: Treatment  PT/PTA: PTA     PTA Visit Number: 3     2023

## 2023-02-23 NOTE — PROGRESS NOTES
"[facility] - Telemetry Stepdown  Nephrology  Progress Note    Patient Name: Rachel Zazueta  MRN: [id number]  Admission Date: 1/19/2023  Hospital Length of Stay: 35 days  Attending Provider: Blas Gilman III,*   Primary Care Physician: Dannielle Merino DO  Principal Problem:Weakness of both lower extremities    Subjective:     HPI: Per HM note: "42 y.o F w/ hx ofIV drug use (methamphetamine), chronic HCV with cirrhosis, spinal fusion (04/2021), epidural abscess with removal of hardware (02/2022), spinal fusion with hardware (T10 - Pelvis / 03/2022), obesity (BMI 39.3) and neurogenic bladder and recent shoulder surgery who initially presented to [facility] for evaluation of back pain and left leg weakness. She reports initially noting the left leg weakness when she was about to go to the bathroom and was about to fall but was assisted by her boyfriend.  She was brought to the ED via EMS. Urinalysis with UTI concerns and blood cultures at OSH grew ESBL E coli, and shoulder effusion concern for infection so she was treated with meropenem.  During hospitalization, she reports the weakness that started out in her left leg initially then spread upwards to her left thigh, left hip, then crossed over to the right hip and spread to her right leg.  Denies recent IV drug use. She reports her last incidence of drug use was more than 3 years ago and also denies tobacco, and alcohol abuse.  Reports cannabis use.     OSH course notable for concerning urinalysis and blood cultures positive for ESBL E coli treated with meropenem.  She was also admit to the ICU where she was treated with Precedex for significant body aches, irritability, and muscle spasms.  Concerns of a murmur on physical exam so she underwent TTE which did not show any vegetations.  Worsening lower extremity weakness workup with MRI head nonspecific, MRI cervical spine with multilevel spondylosis, X-ray spine with multilevel thoracolumbar fusion and " "diskectomy, focal kyphosis at T9-10 increased compared to prior.  She was started on prophylaxis amoxicillin due to her history of infected hardware.  MRI spine unable to be completed due to patient's body habitus so she was [place] [facility] [place] and Neurosurgery, Neurology evaluation." Underwent laminectomy, posterior fusion and washout on 1/24. ID following for discitis, has her on meropenem. Stepped down to  on 2/8.     Nephrology consulted for hyponatremia. Patient reports uncomfortable abdominal distension and leg swelling. Reports drinking 3-4 cups of her 30oz water tumbler daily. Urine Na <10, urine osm 513. Serum Na trend 136--> 132-->131-->129-->127-->135-->134-->127. Normal mentation. Cr/BUN wnl. Albumin 1.8, protein 4.5, bili 1.4.          Interval History: no acute events overnight. Discussed with hepatology, she is not a transplant candidate at this time. Reinforced that she should keep her current fluid restriction to assist with maintaining her level of hyponatremia, to not exacerbate it any further    Review of patient's allergies indicates:   Allergen Reactions    Bee venom protein (honey bee) Anaphylaxis     Patient reports she is allergic to bee stings.    Naproxen Anaphylaxis     Throat closing    12-23- Patient reports taking Ibuprofen 200 mg at home without problems. Verified X3. [place]    Wasp sting [allergen ext-venom-honey bee] Anaphylaxis    Adhesive Blisters    Shellfish containing products     Iodine and iodide containing products Hives and Itching     Allergic to iodine in seafood only    Nuts [tree nut] Rash     Current Facility-Administered Medications   Medication Frequency    acetaminophen tablet 1,000 mg Q12H    albumin human 25% bottle 25 g Q8H    bisacodyL suppository 10 mg Every other day    buPROPion TB24 tablet 150 mg Daily    calcium carbonate 200 mg calcium (500 mg) chewable tablet 1,000 mg TID PRN    dextrose 10% bolus 125 mL 125 mL PRN    " dextrose 10% bolus 250 mL 250 mL PRN    diphenhydrAMINE capsule 25 mg On Call Procedure    DULoxetine DR capsule 30 mg BID    gabapentin capsule 900 mg TID    glucagon (human recombinant) injection 1 mg PRN    glucose chewable tablet 16 g PRN    glucose chewable tablet 24 g PRN    heparin (porcine) injection 5,000 Units Q8H    hydrALAZINE tablet 25 mg Q8H PRN    HYDROmorphone injection 0.5 mg Q4H PRN    hydrOXYzine HCL tablet 25 mg TID PRN    lactulose 20 gram/30 mL solution Soln 20 g TID    melatonin tablet 6 mg Nightly PRN    meropenem (MERREM) 2 g in sodium chloride 0.9% 100 mL IVPB Q8H    methocarbamoL tablet 1,000 mg Q6H    naloxone 0.4 mg/mL injection 0.02 mg PRN    ondansetron injection 4 mg Q6H PRN    oxyCODONE immediate release tablet 5 mg Q3H PRN    And    oxyCODONE immediate release tablet Tab 10 mg Q3H PRN    pantoprazole EC tablet 40 mg Daily    polyethylene glycol packet 17 g Daily PRN    predniSONE tablet 50 mg On Call Procedure    prochlorperazine injection Soln 2.5 mg Q6H PRN    senna-docusate 8.6-50 mg per tablet 1 tablet BID PRN    simethicone chewable tablet 80 mg TID PRN    sodium chloride 0.9% flush 10 mL Q6H    And    sodium chloride 0.9% flush 10 mL PRN       Objective:     Vital Signs (Most Recent):  Temp: 98.2 °F (36.8 °C) (02/23/23 0830)  Pulse: 106 (02/23/23 1102)  Resp: 17 (02/23/23 0830)  BP: (!) 99/58 (02/23/23 0830)  SpO2: 97 % (02/23/23 0830)   Vital Signs (24h Range):  Temp:  [98.1 °F (36.7 °C)-98.4 °F (36.9 °C)] 98.2 °F (36.8 °C)  Pulse:  [] 106  Resp:  [17-18] 17  SpO2:  [97 %-99 %] 97 %  BP: ()/(50-60) 99/58     Weight: (!) 142 kg (313 lb) (02/20/23 0556)  Body mass index is 49.02 kg/m².  Body surface area is 2.59 meters squared.    I/O last 3 completed shifts:  In: -   Out: 1565 [Urine:950; Drains:615]    Physical Exam  Constitutional:       General: She is not in acute distress.     Appearance: Normal appearance. She is obese. She is not  ill-appearing.   HENT:      Head: Normocephalic and atraumatic.   Eyes:      General: No scleral icterus.  Cardiovascular:      Rate and Rhythm: Normal rate.      Pulses: Normal pulses.   Pulmonary:      Effort: Pulmonary effort is normal.   Abdominal:      General: There is distension.      Tenderness: There is abdominal tenderness. There is no guarding.   Musculoskeletal:      Right lower leg: Edema present.      Left lower leg: Edema present.   Skin:     General: Skin is warm and dry.      Coloration: Skin is not jaundiced.   Neurological:      Mental Status: She is alert and oriented to person, place, and time.       Significant Labs:  All labs within the past 24 hours have been reviewed.     Significant Imaging:  Labs: Reviewed    Assessment/Plan:     Renal/  Hyponatremia  42 y.o F w/ hx of IV drug use (meth), chronic HCV with cirrhosis, spinal fusion (04/2021), epidural abscess with removal of hardware (02/2022), spinal fusion with hardware (T10 - Pelvis / 03/2022), and neurogenic bladder here with complicated ESBL E.coli epidural abscess s/p washout,corpectomy, fixation being treated w/ meropenem.     Nephrology consulted for hyponatremia. Patient reports uncomfortable abdominal distension and leg swelling. Reports drinking 3-4 cups of her 30oz water tumbler daily. Urine Na <10, urine osm 513. Serum osm 283. Serum Na stable. Normal mentation.    Hyponatremia likely 2/2 to low effective circulating volume 2/2 cirrhosis. Possible poor solute intake relative to free water intake. Unlikely SIADH given low urine Na.   Repeat urine studies 2/14 suggestive of appropriate ADH in setting of cirrhosis    Recommending 1 unit of PRBC for hemoglobin < 7, can stop albumin infusion after transfusion. Additionally recommend 1 mg PO of bumex to assist with volume status       GI  Chronic hepatitis C with cirrhosis  Will need to f/u with outpatient hepatology for antiviral treatment    Cirrhosis of liver with ascites  Would  minimize risk of acute liver injury by restricting tylenol use, however will defer to primary        Thank you for your consult. I will follow-up with patient. Please contact us if you have any additional questions.    Spencer Khan MD  Nephrology  [facility] - Telemetry Stepdown

## 2023-02-24 PROBLEM — Z02.9 DISCHARGE PLANNING ISSUES: Status: ACTIVE | Noted: 2023-02-24

## 2023-02-24 PROBLEM — Z75.8 DISCHARGE PLANNING ISSUES: Status: ACTIVE | Noted: 2023-02-24

## 2023-02-24 PROBLEM — U07.1 COVID-19: Status: ACTIVE | Noted: 2023-02-24

## 2023-02-24 LAB
ALBUMIN SERPL BCP-MCNC: 2.9 G/DL (ref 3.5–5.2)
ALP SERPL-CCNC: 126 U/L (ref 55–135)
ALT SERPL W/O P-5'-P-CCNC: 33 U/L (ref 10–44)
ANION GAP SERPL CALC-SCNC: 6 MMOL/L (ref 8–16)
AST SERPL-CCNC: 62 U/L (ref 10–40)
BASOPHILS # BLD AUTO: 0.01 K/UL (ref 0–0.2)
BASOPHILS NFR BLD: 1.4 % (ref 0–1.9)
BILIRUB SERPL-MCNC: 1.5 MG/DL (ref 0.1–1)
BUN SERPL-MCNC: 12 MG/DL (ref 6–20)
CALCIUM SERPL-MCNC: 8.2 MG/DL (ref 8.7–10.5)
CHLORIDE SERPL-SCNC: 93 MMOL/L (ref 95–110)
CO2 SERPL-SCNC: 32 MMOL/L (ref 23–29)
CREAT SERPL-MCNC: 0.5 MG/DL (ref 0.5–1.4)
DIFFERENTIAL METHOD: ABNORMAL
EOSINOPHIL # BLD AUTO: 0 K/UL (ref 0–0.5)
EOSINOPHIL NFR BLD: 1.4 % (ref 0–8)
ERYTHROCYTE [DISTWIDTH] IN BLOOD BY AUTOMATED COUNT: 16.8 % (ref 11.5–14.5)
EST. GFR  (NO RACE VARIABLE): >60 ML/MIN/1.73 M^2
GLUCOSE SERPL-MCNC: 120 MG/DL (ref 70–110)
HCT VFR BLD AUTO: 24 % (ref 37–48.5)
HGB BLD-MCNC: 7.5 G/DL (ref 12–16)
IMM GRANULOCYTES # BLD AUTO: 0 K/UL (ref 0–0.04)
IMM GRANULOCYTES NFR BLD AUTO: 0 % (ref 0–0.5)
INFLUENZA A, MOLECULAR: NOT DETECTED
INFLUENZA B, MOLECULAR: NOT DETECTED
INR PPP: 1.3 (ref 0.8–1.2)
LYMPHOCYTES # BLD AUTO: 0.2 K/UL (ref 1–4.8)
LYMPHOCYTES NFR BLD: 27 % (ref 18–48)
MAGNESIUM SERPL-MCNC: 1.8 MG/DL (ref 1.6–2.6)
MCH RBC QN AUTO: 29 PG (ref 27–31)
MCHC RBC AUTO-ENTMCNC: 31.3 G/DL (ref 32–36)
MCV RBC AUTO: 93 FL (ref 82–98)
MONOCYTES # BLD AUTO: 0.1 K/UL (ref 0.3–1)
MONOCYTES NFR BLD: 18.9 % (ref 4–15)
NEUTROPHILS # BLD AUTO: 0.4 K/UL (ref 1.8–7.7)
NEUTROPHILS NFR BLD: 51.3 % (ref 38–73)
NRBC BLD-RTO: 0 /100 WBC
PHOSPHATE SERPL-MCNC: 2.3 MG/DL (ref 2.7–4.5)
PLATELET # BLD AUTO: 37 K/UL (ref 150–450)
PLATELET BLD QL SMEAR: ABNORMAL
PMV BLD AUTO: 9.8 FL (ref 9.2–12.9)
POTASSIUM SERPL-SCNC: 3.4 MMOL/L (ref 3.5–5.1)
PROT SERPL-MCNC: 5.2 G/DL (ref 6–8.4)
PROTHROMBIN TIME: 13.4 SEC (ref 9–12.5)
RBC # BLD AUTO: 2.59 M/UL (ref 4–5.4)
RSV AG BY MOLECULAR METHOD: NOT DETECTED
SARS-COV-2 RNA RESP QL NAA+PROBE: DETECTED
SARS-COV-2 RNA RESP QL NAA+PROBE: DETECTED
SODIUM SERPL-SCNC: 131 MMOL/L (ref 136–145)
WBC # BLD AUTO: 0.74 K/UL (ref 3.9–12.7)

## 2023-02-24 PROCEDURE — 99233 PR SUBSEQUENT HOSPITAL CARE,LEVL III: ICD-10-PCS | Mod: CR,,, | Performed by: NURSE PRACTITIONER

## 2023-02-24 PROCEDURE — 25000003 PHARM REV CODE 250: Performed by: PSYCHIATRY & NEUROLOGY

## 2023-02-24 PROCEDURE — 99233 SBSQ HOSP IP/OBS HIGH 50: CPT | Mod: CR,,, | Performed by: NURSE PRACTITIONER

## 2023-02-24 PROCEDURE — 85025 COMPLETE CBC W/AUTO DIFF WBC: CPT

## 2023-02-24 PROCEDURE — 84100 ASSAY OF PHOSPHORUS: CPT | Performed by: STUDENT IN AN ORGANIZED HEALTH CARE EDUCATION/TRAINING PROGRAM

## 2023-02-24 PROCEDURE — 97530 THERAPEUTIC ACTIVITIES: CPT | Mod: CQ

## 2023-02-24 PROCEDURE — 80053 COMPREHEN METABOLIC PANEL: CPT | Performed by: STUDENT IN AN ORGANIZED HEALTH CARE EDUCATION/TRAINING PROGRAM

## 2023-02-24 PROCEDURE — 99233 SBSQ HOSP IP/OBS HIGH 50: CPT | Mod: ,,, | Performed by: FAMILY MEDICINE

## 2023-02-24 PROCEDURE — 25000003 PHARM REV CODE 250

## 2023-02-24 PROCEDURE — 27000207 HC ISOLATION

## 2023-02-24 PROCEDURE — 85610 PROTHROMBIN TIME: CPT | Performed by: STUDENT IN AN ORGANIZED HEALTH CARE EDUCATION/TRAINING PROGRAM

## 2023-02-24 PROCEDURE — 63600175 PHARM REV CODE 636 W HCPCS

## 2023-02-24 PROCEDURE — 94761 N-INVAS EAR/PLS OXIMETRY MLT: CPT

## 2023-02-24 PROCEDURE — 25000003 PHARM REV CODE 250: Performed by: HOSPITALIST

## 2023-02-24 PROCEDURE — 87502 INFLUENZA DNA AMP PROBE: CPT | Performed by: FAMILY MEDICINE

## 2023-02-24 PROCEDURE — 25000003 PHARM REV CODE 250: Performed by: FAMILY MEDICINE

## 2023-02-24 PROCEDURE — 25000003 PHARM REV CODE 250: Performed by: STUDENT IN AN ORGANIZED HEALTH CARE EDUCATION/TRAINING PROGRAM

## 2023-02-24 PROCEDURE — 20600001 HC STEP DOWN PRIVATE ROOM

## 2023-02-24 PROCEDURE — 63600175 PHARM REV CODE 636 W HCPCS: Performed by: NURSE PRACTITIONER

## 2023-02-24 PROCEDURE — 25000003 PHARM REV CODE 250: Performed by: INTERNAL MEDICINE

## 2023-02-24 PROCEDURE — 0241U SARS-COV2 (COVID) WITH FLU/RSV BY PCR: CPT | Performed by: FAMILY MEDICINE

## 2023-02-24 PROCEDURE — 63600175 PHARM REV CODE 636 W HCPCS: Performed by: STUDENT IN AN ORGANIZED HEALTH CARE EDUCATION/TRAINING PROGRAM

## 2023-02-24 PROCEDURE — 99233 PR SUBSEQUENT HOSPITAL CARE,LEVL III: ICD-10-PCS | Mod: ,,, | Performed by: FAMILY MEDICINE

## 2023-02-24 PROCEDURE — 83735 ASSAY OF MAGNESIUM: CPT | Performed by: STUDENT IN AN ORGANIZED HEALTH CARE EDUCATION/TRAINING PROGRAM

## 2023-02-24 PROCEDURE — A4216 STERILE WATER/SALINE, 10 ML: HCPCS | Performed by: PSYCHIATRY & NEUROLOGY

## 2023-02-24 PROCEDURE — 63600175 PHARM REV CODE 636 W HCPCS: Mod: TB | Performed by: FAMILY MEDICINE

## 2023-02-24 RX ORDER — BUMETANIDE 1 MG/1
1 TABLET ORAL DAILY
Status: DISCONTINUED | OUTPATIENT
Start: 2023-02-24 | End: 2023-02-28 | Stop reason: HOSPADM

## 2023-02-24 RX ORDER — BENZONATATE 100 MG/1
100 CAPSULE ORAL 3 TIMES DAILY PRN
Status: DISCONTINUED | OUTPATIENT
Start: 2023-02-24 | End: 2023-02-28 | Stop reason: HOSPADM

## 2023-02-24 RX ORDER — GUAIFENESIN 600 MG/1
600 TABLET, EXTENDED RELEASE ORAL 2 TIMES DAILY
Status: DISCONTINUED | OUTPATIENT
Start: 2023-02-24 | End: 2023-02-28 | Stop reason: HOSPADM

## 2023-02-24 RX ORDER — POTASSIUM CHLORIDE 750 MG/1
10 CAPSULE, EXTENDED RELEASE ORAL ONCE
Status: COMPLETED | OUTPATIENT
Start: 2023-02-25 | End: 2023-02-25

## 2023-02-24 RX ORDER — POTASSIUM CHLORIDE 20 MEQ/1
40 TABLET, EXTENDED RELEASE ORAL
Status: COMPLETED | OUTPATIENT
Start: 2023-02-24 | End: 2023-02-24

## 2023-02-24 RX ADMIN — DULOXETINE 30 MG: 30 CAPSULE, DELAYED RELEASE ORAL at 08:02

## 2023-02-24 RX ADMIN — ONDANSETRON 4 MG: 2 INJECTION INTRAMUSCULAR; INTRAVENOUS at 01:02

## 2023-02-24 RX ADMIN — HEPARIN SODIUM 5000 UNITS: 5000 INJECTION INTRAVENOUS; SUBCUTANEOUS at 10:02

## 2023-02-24 RX ADMIN — OXYCODONE HYDROCHLORIDE 10 MG: 10 TABLET ORAL at 08:02

## 2023-02-24 RX ADMIN — METHOCARBAMOL 1000 MG: 500 TABLET ORAL at 05:02

## 2023-02-24 RX ADMIN — Medication 10 ML: at 11:02

## 2023-02-24 RX ADMIN — ACETAMINOPHEN 1000 MG: 500 TABLET ORAL at 08:02

## 2023-02-24 RX ADMIN — GABAPENTIN 900 MG: 300 CAPSULE ORAL at 08:02

## 2023-02-24 RX ADMIN — HYDROMORPHONE HYDROCHLORIDE 0.5 MG: 1 INJECTION, SOLUTION INTRAMUSCULAR; INTRAVENOUS; SUBCUTANEOUS at 01:02

## 2023-02-24 RX ADMIN — POTASSIUM CHLORIDE 40 MEQ: 1500 TABLET, EXTENDED RELEASE ORAL at 10:02

## 2023-02-24 RX ADMIN — BUMETANIDE 1 MG: 1 TABLET ORAL at 09:02

## 2023-02-24 RX ADMIN — LACTULOSE 20 G: 20 SOLUTION ORAL at 02:02

## 2023-02-24 RX ADMIN — HEPARIN SODIUM 5000 UNITS: 5000 INJECTION INTRAVENOUS; SUBCUTANEOUS at 05:02

## 2023-02-24 RX ADMIN — METHOCARBAMOL 1000 MG: 500 TABLET ORAL at 12:02

## 2023-02-24 RX ADMIN — REMDESIVIR 200 MG: 100 INJECTION, POWDER, LYOPHILIZED, FOR SOLUTION INTRAVENOUS at 02:02

## 2023-02-24 RX ADMIN — Medication 10 ML: at 05:02

## 2023-02-24 RX ADMIN — Medication 6 MG: at 08:02

## 2023-02-24 RX ADMIN — MEROPENEM 2 G: 1 INJECTION INTRAVENOUS at 10:02

## 2023-02-24 RX ADMIN — PANTOPRAZOLE SODIUM 40 MG: 40 TABLET, DELAYED RELEASE ORAL at 08:02

## 2023-02-24 RX ADMIN — POTASSIUM CHLORIDE 40 MEQ: 1500 TABLET, EXTENDED RELEASE ORAL at 02:02

## 2023-02-24 RX ADMIN — BUPROPION HYDROCHLORIDE 150 MG: 150 TABLET, FILM COATED, EXTENDED RELEASE ORAL at 08:02

## 2023-02-24 RX ADMIN — MEROPENEM 2 G: 1 INJECTION INTRAVENOUS at 08:02

## 2023-02-24 RX ADMIN — Medication 10 ML: at 12:02

## 2023-02-24 RX ADMIN — LACTULOSE 20 G: 20 SOLUTION ORAL at 08:02

## 2023-02-24 RX ADMIN — GABAPENTIN 900 MG: 300 CAPSULE ORAL at 02:02

## 2023-02-24 RX ADMIN — Medication 10 ML: at 06:02

## 2023-02-24 RX ADMIN — GUAIFENESIN 600 MG: 600 TABLET, EXTENDED RELEASE ORAL at 08:02

## 2023-02-24 RX ADMIN — CALCIUM CARBONATE (ANTACID) CHEW TAB 500 MG 1000 MG: 500 CHEW TAB at 08:02

## 2023-02-24 RX ADMIN — HEPARIN SODIUM 5000 UNITS: 5000 INJECTION INTRAVENOUS; SUBCUTANEOUS at 02:02

## 2023-02-24 RX ADMIN — METHOCARBAMOL 1000 MG: 500 TABLET ORAL at 11:02

## 2023-02-24 RX ADMIN — MEROPENEM 2 G: 1 INJECTION INTRAVENOUS at 03:02

## 2023-02-24 RX ADMIN — GUAIFENESIN 600 MG: 600 TABLET, EXTENDED RELEASE ORAL at 10:02

## 2023-02-24 NOTE — ASSESSMENT & PLAN NOTE
Chylous ascites  Hepatitis-C    MELD-Na score: 9 at 2/23/2023  3:59 AM  MELD score: 9 at 2/23/2023  3:59 AM  Calculated from:  Serum Creatinine: 0.5 mg/dL (Using min of 1 mg/dL) at 2/23/2023  3:59 AM  Serum Sodium: 130 mmol/L at 2/23/2023  3:59 AM  Total Bilirubin: 0.8 mg/dL (Using min of 1 mg/dL) at 2/23/2023  3:59 AM  INR(ratio): 1.3 at 2/23/2023  3:59 AM  Age: 42 years      Patient has reportedly refused transplant evaluation in the past.   - Daily CMP and trend LFTs  - Continue Lactulose 20 g TID  - diuresis held due to hyponatremia  - Will need Hepatology follow up outpatient  -2/10   ammonia at 57 . sono abdomen  . tylenol changed to 1000mg q12h with cirrhosis . s/p IR paracentesis.  -2/11no evidence of SBP on paracentesis. 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   -2/13 s/p paracentesis. Removed 4,700 ml. DVT sonogram LE negative. Removed 4,700 ml. DVT sonogram LE negative.   -2/14 Triglycerides in ascitic fluid 387 consistent with chylous ascites.  IR consulted  for lymphangiogram and  eval for  lymphatic repair. IR deferring lymphangiogram at this time.  -2/15  IR eval -repeat paracentesis to trend volume output and  conservative management    .     -Will continue to monitor for further paracentesis need.    -Discussed with hepatology, not a liver transplant candidate.

## 2023-02-24 NOTE — PROGRESS NOTES
"[facility] - Telemetry Stepdown  Nephrology  Progress Note    Patient Name: Rachel Zazueta  MRN: [id number]  Admission Date: 1/19/2023  Hospital Length of Stay: 36 days  Attending Provider: Blas Gilman III,*   Primary Care Physician: Dannielle Merino DO  Principal Problem:Weakness of both lower extremities    Subjective:     HPI: Per HM note: "42 y.o F w/ hx ofIV drug use (methamphetamine), chronic HCV with cirrhosis, spinal fusion (04/2021), epidural abscess with removal of hardware (02/2022), spinal fusion with hardware (T10 - Pelvis / 03/2022), obesity (BMI 39.3) and neurogenic bladder and recent shoulder surgery who initially presented to [facility] for evaluation of back pain and left leg weakness. She reports initially noting the left leg weakness when she was about to go to the bathroom and was about to fall but was assisted by her boyfriend.  She was brought to the ED via EMS. Urinalysis with UTI concerns and blood cultures at OSH grew ESBL E coli, and shoulder effusion concern for infection so she was treated with meropenem.  During hospitalization, she reports the weakness that started out in her left leg initially then spread upwards to her left thigh, left hip, then crossed over to the right hip and spread to her right leg.  Denies recent IV drug use. She reports her last incidence of drug use was more than 3 years ago and also denies tobacco, and alcohol abuse.  Reports cannabis use.     OSH course notable for concerning urinalysis and blood cultures positive for ESBL E coli treated with meropenem.  She was also admit to the ICU where she was treated with Precedex for significant body aches, irritability, and muscle spasms.  Concerns of a murmur on physical exam so she underwent TTE which did not show any vegetations.  Worsening lower extremity weakness workup with MRI head nonspecific, MRI cervical spine with multilevel spondylosis, X-ray spine with multilevel thoracolumbar fusion and " "diskectomy, focal kyphosis at T9-10 increased compared to prior.  She was started on prophylaxis amoxicillin due to her history of infected hardware.  MRI spine unable to be completed due to patient's body habitus so [place] [facility] [place] and Neurosurgery, Neurology evaluation." Underwent laminectomy, posterior fusion and washout on 1/24. ID following for discitis, has her on meropenem. Stepped down to  on 2/8.     Nephrology consulted for hyponatremia. Patient reports uncomfortable abdominal distension and leg swelling. Reports drinking 3-4 cups of her 30oz water tumbler daily. Urine Na <10, urine osm 513. Serum Na trend 136--> 132-->131-->129-->127-->135-->134-->127. Normal mentation. Cr/BUN wnl. Albumin 1.8, protein 4.5, bili 1.4.          Interval History: continues to be compliant with her fluid restriction.     Review of patient's allergies indicates:   Allergen Reactions    Bee venom protein (honey bee) Anaphylaxis     Patient reports she is allergic to bee stings.    Naproxen Anaphylaxis     Throat closing    12-23- Patient reports taking Ibuprofen 200 mg at home without problems. Verified X3. [place]    Wasp sting [allergen ext-venom-honey bee] Anaphylaxis    Adhesive Blisters    Shellfish containing products     Iodine and iodide containing products Hives and Itching     Allergic to iodine in seafood only    Nuts [tree nut] Rash     Current Facility-Administered Medications   Medication Frequency    0.9%  NaCl infusion (for blood administration) Q24H PRN    acetaminophen tablet 1,000 mg Q12H    benzonatate capsule 100 mg TID PRN    bisacodyL suppository 10 mg Every other day    bumetanide tablet 1 mg On Call Procedure    bumetanide tablet 1 mg Daily    buPROPion TB24 tablet 150 mg Daily    calcium carbonate 200 mg calcium (500 mg) chewable tablet 1,000 mg TID PRN    dextrose 10% bolus 125 mL 125 mL PRN    dextrose 10% bolus 250 mL 250 mL PRN    diphenhydrAMINE " capsule 25 mg On Call Procedure    DULoxetine DR capsule 30 mg BID    gabapentin capsule 900 mg TID    glucagon (human recombinant) injection 1 mg PRN    glucose chewable tablet 16 g PRN    glucose chewable tablet 24 g PRN    guaiFENesin 12 hr tablet 600 mg BID    heparin (porcine) injection 5,000 Units Q8H    hydrALAZINE tablet 25 mg Q8H PRN    HYDROmorphone injection 0.5 mg Q4H PRN    hydrOXYzine HCL tablet 25 mg TID PRN    lactulose 20 gram/30 mL solution Soln 20 g TID    melatonin tablet 6 mg Nightly PRN    meropenem (MERREM) 2 g in sodium chloride 0.9% 100 mL IVPB Q8H    methocarbamoL tablet 1,000 mg Q6H    naloxone 0.4 mg/mL injection 0.02 mg PRN    ondansetron injection 4 mg Q6H PRN    oxyCODONE immediate release tablet 5 mg Q3H PRN    And    oxyCODONE immediate release tablet Tab 10 mg Q3H PRN    pantoprazole EC tablet 40 mg Daily    polyethylene glycol packet 17 g Daily PRN    [START ON 2/25/2023] potassium chloride CR capsule 10 mEq Once    predniSONE tablet 50 mg On Call Procedure    prochlorperazine injection Soln 2.5 mg Q6H PRN    [START ON 2/25/2023] remdesivir 100 mg in sodium chloride 0.9% 250 mL infusion Daily    senna-docusate 8.6-50 mg per tablet 1 tablet BID PRN    simethicone chewable tablet 80 mg TID PRN    sodium chloride 0.9% flush 10 mL Q6H    And    sodium chloride 0.9% flush 10 mL PRN       Objective:     Vital Signs (Most Recent):  Temp: 99 °F (37.2 °C) (02/24/23 1530)  Pulse: 91 (02/24/23 1530)  Resp: 19 (02/24/23 1530)  BP: (!) 102/52 (02/24/23 1530)  SpO2: 97 % (02/24/23 1530)   Vital Signs (24h Range):  Temp:  [97.5 °F (36.4 °C)-99 °F (37.2 °C)] 99 °F (37.2 °C)  Pulse:  [] 91  Resp:  [16-19] 19  SpO2:  [93 %-100 %] 97 %  BP: ()/(48-63) 102/52     Weight: (!) 142 kg (313 lb 0.9 oz) (02/23/23 1234)  Body mass index is 49.03 kg/m².  Body surface area is 2.59 meters squared.    I/O last 3 completed shifts:  In: 768 [P.O.:418; Blood:350]  Out: 2109  [Urine:1800; Drains:795]    Physical Exam  Constitutional:       General: She is not in acute distress.     Appearance: Normal appearance. She is obese. She is not ill-appearing.   HENT:      Head: Normocephalic and atraumatic.   Eyes:      General: No scleral icterus.  Cardiovascular:      Rate and Rhythm: Normal rate.      Pulses: Normal pulses.   Pulmonary:      Effort: Pulmonary effort is normal.   Abdominal:      General: There is distension.      Tenderness: There is abdominal tenderness. There is no guarding.   Musculoskeletal:      Right lower leg: Edema present.      Left lower leg: Edema present.   Skin:     General: Skin is warm and dry.      Coloration: Skin is not jaundiced.   Neurological:      Mental Status: She is alert and oriented to person, place, and time.       Significant Labs:  All labs within the past 24 hours have been reviewed.     Significant Imaging:  Labs: Reviewed    Assessment/Plan:     Renal/  Hyponatremia  42 y.o F w/ hx of IV drug use (meth), chronic HCV with cirrhosis, spinal fusion (04/2021), epidural abscess with removal of hardware (02/2022), spinal fusion with hardware (T10 - Pelvis / 03/2022), and neurogenic bladder here with complicated ESBL E.coli epidural abscess s/p washout,corpectomy, fixation being treated w/ meropenem.     Nephrology consulted for hyponatremia. Patient reports uncomfortable abdominal distension and leg swelling. Reports drinking 3-4 cups of her 30oz water tumbler daily. Urine Na <10, urine osm 513. Serum osm 283. Serum Na stable. Normal mentation.    Hyponatremia likely 2/2 to low effective circulating volume 2/2 cirrhosis. Possible poor solute intake relative to free water intake. Unlikely SIADH given low urine Na.   Repeat urine studies 2/14 suggestive of appropriate ADH in setting of cirrhosis    Continue bumex 1 mg PO      GI  Chronic hepatitis C with cirrhosis  Will need to f/u with outpatient hepatology for antiviral treatment    Cirrhosis of  liver with ascites  Would minimize risk of acute liver injury by restricting tylenol use, however will defer to primary        Thank you for your consult. I will follow-up with patient. Please contact us if you have any additional questions.    Spencer Khan MD  Nephrology  [facility] - Telemetry Stepdown

## 2023-02-24 NOTE — SUBJECTIVE & OBJECTIVE
Interval History:  Patient denies any dizziness, chest pain, shortness breast, palpitations, abdominal pain, or vomiting.  Patient does report intermittent nausea and reflux.  Patient does report some lower extremity swelling but thinks it has improved.  Patient does not report any worsening abdominal distention.  Patient actually says abdomen feels better following bowel movements induced by laxatives.  Denies any blood in stool or urine.     Review of systems:  Negative except for as mentioned above    Objective:     Vital Signs (Most Recent):  Temp: 98.6 °F (37 °C) (02/23/23 1759)  Pulse: 96 (02/23/23 1932)  Resp: 18 (02/23/23 1759)  BP: 100/63 (02/23/23 1759)  SpO2: 99 % (02/23/23 1759)   Vital Signs (24h Range):  Temp:  [98 °F (36.7 °C)-98.8 °F (37.1 °C)] 98.6 °F (37 °C)  Pulse:  [] 96  Resp:  [17-18] 18  SpO2:  [96 %-100 %] 99 %  BP: ()/(50-63) 100/63     Weight: (!) 142 kg (313 lb 0.9 oz)  Body mass index is 49.03 kg/m².    Intake/Output Summary (Last 24 hours) at 2/23/2023 1951  Last data filed at 2/23/2023 1552  Gross per 24 hour   Intake 468 ml   Output 1045 ml   Net -577 ml        Physical Exam  Vitals and nursing note reviewed.   Constitutional:       General: She is not in acute distress.     Appearance: She is well-developed. She is obese. She is ill-appearing. She is not toxic-appearing or diaphoretic.   HENT:      Head: Normocephalic and atraumatic.      Nose: Nose normal.      Mouth/Throat:      Mouth: Mucous membranes are moist.      Pharynx: Oropharynx is clear.   Eyes:      General: No scleral icterus.  Cardiovascular:      Rate and Rhythm: Normal rate and regular rhythm.      Pulses: Normal pulses.      Heart sounds: Murmur heard.   Pulmonary:      Effort: Pulmonary effort is normal.      Breath sounds: Normal breath sounds.   Abdominal:      General: There is distension.      Palpations: Abdomen is soft.      Tenderness: There is no abdominal tenderness. There is no guarding or  rebound.      Comments: 4 drains in place with serosanguinous fluid   Musculoskeletal:         General: No tenderness.      Right lower leg: Edema present.      Left lower leg: Edema present.   Skin:     General: Skin is warm and dry.      Comments: Chronic venous stasis changes bilateral lower extremities   Neurological:      Mental Status: She is alert and oriented to person, place, and time.   Psychiatric:         Behavior: Behavior normal.       Significant Labs: All pertinent labs within the past 24 hours have been reviewed.      Recent Results (from the past 24 hour(s))   Comprehensive Metabolic Panel    Collection Time: 02/23/23  3:59 AM   Result Value Ref Range    Sodium 130 (L) 136 - 145 mmol/L    Potassium 3.6 3.5 - 5.1 mmol/L    Chloride 94 (L) 95 - 110 mmol/L    CO2 29 23 - 29 mmol/L    Glucose 101 70 - 110 mg/dL    BUN 14 6 - 20 mg/dL    Creatinine 0.5 0.5 - 1.4 mg/dL    Calcium 8.2 (L) 8.7 - 10.5 mg/dL    Total Protein 5.0 (L) 6.0 - 8.4 g/dL    Albumin 2.7 (L) 3.5 - 5.2 g/dL    Total Bilirubin 0.8 0.1 - 1.0 mg/dL    Alkaline Phosphatase 127 55 - 135 U/L    AST 68 (H) 10 - 40 U/L    ALT 34 10 - 44 U/L    Anion Gap 7 (L) 8 - 16 mmol/L    eGFR >60.0 >60 mL/min/1.73 m^2   Magnesium    Collection Time: 02/23/23  3:59 AM   Result Value Ref Range    Magnesium 1.9 1.6 - 2.6 mg/dL   Phosphorus    Collection Time: 02/23/23  3:59 AM   Result Value Ref Range    Phosphorus 2.4 (L) 2.7 - 4.5 mg/dL   CBC Auto Differential    Collection Time: 02/23/23  3:59 AM   Result Value Ref Range    WBC 0.67 (LL) 3.90 - 12.70 K/uL    RBC 2.34 (L) 4.00 - 5.40 M/uL    Hemoglobin 6.7 (L) 12.0 - 16.0 g/dL    Hematocrit 21.4 (L) 37.0 - 48.5 %    MCV 92 82 - 98 fL    MCH 28.6 27.0 - 31.0 pg    MCHC 31.3 (L) 32.0 - 36.0 g/dL    RDW 17.2 (H) 11.5 - 14.5 %    Platelets 38 (LL) 150 - 450 K/uL    MPV 9.9 9.2 - 12.9 fL    Immature Granulocytes CANCELED 0.0 - 0.5 %    Immature Grans (Abs) CANCELED 0.00 - 0.04 K/uL    Lymph # Test Not  Performed 1.0 - 4.8 K/uL    Mono # Test Not Performed 0.3 - 1.0 K/uL    Eos # Test Not Performed 0.0 - 0.5 K/uL    Baso # Test Not Performed 0.00 - 0.20 K/uL    nRBC 0 0 /100 WBC    Gran % 35.0 (L) 38.0 - 73.0 %    Lymph % 34.0 18.0 - 48.0 %    Mono % 19.0 (H) 4.0 - 15.0 %    Eosinophil % 2.0 0.0 - 8.0 %    Basophil % 1.0 0.0 - 1.9 %    Bands 6.0 %    Metamyelocytes 3.0 %    Platelet Estimate Decreased (A)     Aniso Slight     Poik Slight     Poly Occasional     Hypo Occasional     Ovalocytes Occasional     Target Cells Occasional     Tear Drop Cells Occasional     Spherocytes Occasional     Differential Method Manual    Protime-INR    Collection Time: 02/23/23  3:59 AM   Result Value Ref Range    Prothrombin Time 13.4 (H) 9.0 - 12.5 sec    INR 1.3 (H) 0.8 - 1.2   Prepare RBC 1 Unit    Collection Time: 02/23/23 12:49 PM   Result Value Ref Range    UNIT NUMBER [id number]     Product Code [id number]     DISPENSE STATUS ISSUED     CODING SYSTEM OBBZ602     Unit Blood Type Code 6200     Unit Blood Type A POS     Unit Expiration [id number]     CROSSMATCH INTERPRETATION Compatible    Type & Screen    Collection Time: 02/23/23 12:49 PM   Result Value Ref Range    Group & Rh A POS     Indirect Natalie NEG        Significant Imaging: I have reviewed all pertinent imaging results/findings within the past 24 hours.

## 2023-02-24 NOTE — PROGRESS NOTES
[facility] - Telemetry [facility] Medicine  Progress Note    Patient Name: Rachel Zazueta  MRN: [id number]  Patient Class: IP- Inpatient   Admission Date: 1/19/2023  Length of Stay: 35 days  Attending Physician: Blas Gilman III,*  Primary Care Provider: Dannielle Merino DO        Subjective:     Principal Problem:Weakness of both lower extremities        HPI:  Ms. Zazueta is a 42 y.o F w/ hx ofIV drug use (methamphetamine), chronic HCV with cirrhosis, spinal fusion (04/2021), epidural abscess with removal of hardware (02/2022), spinal fusion with hardware (T10 - Pelvis / 03/2022), obesity (BMI 39.3) and neurogenic bladder who initially presented to [facility] for evaluation of back pain and left leg weakness.  She reports initially noting the left leg weakness when she was about to go to the bathroom and was about to fall but was assisted by her boyfriend.  She was brought to the ED via EMS.  Urinalysis with UTI concerns and blood cultures at OSH grew ESBL E coli so she was treated with meropenem.  During hospitalization, she reports the weakness that started out in her left leg initially then spread upwards to her left thigh, left hip, then crossed over to the right hip and spread to her right leg.  Denies recent IV drug use. She reports her last incidence of drug use was more than 3 years ago and also denies tobacco, and alcohol abuse.  Reports cannabis use.    She also reports more than 10 years ago she had an episode of a headache that lasted about 4 days and was associated with residual defects of a strabismus in the left eye with associated visual disturbances.  She also reports over the past few years her friends have noticed that she sometimes has word-finding difficulty during conversations.     Reports shortness of breath when sitting up, fever, chills, back pain, lower extremity weakness(initally L>R, but now R>L), diarrhea.  Denies cough, nausea, vomiting, constipation, bladder  or bowel incontinence, dysuria, dark urine, new vision changes.    OSH course notable for concerning urinalysis and blood cultures positive for ESBL E coli treated with meropenem.  She was also admit to the ICU where she was treated with Precedex for significant body aches, irritability, and muscle spasms.  Concerns of a murmur on physical exam so she underwent TTE which did not show any vegetations.  Worsening lower extremity weakness workup with MRI head nonspecific, MRI cervical spine with multilevel spondylosis, X-ray spine with multilevel thoracolumbar fusion and diskectomy, focal kyphosis at T9-10 increased compared to prior.  She was started on prophylaxis amoxicillin due to her history of infected hardware.  MRI spine unable to be completed due to patient's body habitus so she was transferred to [facility] for further imaging and Neurosurgery, Neurology evaluation.      Overview/Hospital Course:  Pt admitted as a transfer from [facility] for worsening LE weakness, concern for spinal infection, and need for MRI which could not be done at OSH. Imaging was completed here. Worsening proximal junctional kyphosis noted at T9-10 noted with concern for discitis at T8-9, T9-10. NSGY evaluated.     Found to have T8-T10 kyphosis on MRI. 1/24 underwent laminectomy T8-T10; posterior fusion T8-T12; and washout. 2/2 underwent T4-pelvis fusion and T9-T10 corpectomy.  ID consulted for thoracic discitis infection.  Patient to complete 8 weeks of therapy with meropenem. Patient underwent flap closure with Plastic surgery.  Step-down Hospital Medicine on 02/08.    2/9   On meropenem for MDR-ESCHERICHIA COLI ESBL  sodium trended down to 129.  Neurosurgery following post OR and aiding in pain management. PCA discontinued 2/8 . On oxycodone 10mg PO q4h . requiring IV dilaudid q4 as needed,. drains to gravity output 990ml/24h . Plastic surgery following flap, managing wounds.  Wound VAC discontinued on Wednesday.  accepted to [facility] pain  management consulted for back ache- switched to multimodal therapy. sodium trended down to 127.   2/10 sodium stable at 127 . continues with increased drain output 985ml/24h . negative balance of  2.8 L lasix on hold.  nephrology consulted. abdominal X ray -  Nonobstructive bowel gas pattern.  No free air.  As before, mild gaseous distension of stomach. ammonia at 57 . sono abdomen  . tylenol changed to 1000mg q12h with cirrhosis . sono abdomen -Cirrhotic liver morphology.  Sequela of portal hypertension including moderate ascites, recanalized umbilical vein.  No focal hepatic lesions. Cholecystectomy. . Nephrology recs  fluid restriction to 1 L,  lasix 60 mg IV daily,  and trend sodium q12h.  diazepam discontinued . s/p IR paracentesis today   2/11no evidence of SBP on paracentesis. 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 AFB and Triglyerides on ascitic fluid.  noncompliant with  fluid restriction to 1 L despite counseling. sodium trended down to 126 .received 2 doses of IV lasix 60mg. drain output 250ml. Increased Lasix IV 80 BID  due to UOP 500ml/24h   2/12 UOP of 1250ml/24h. K and P replaced. sodium trended up to 129.  hepatology eval -If ascitic fluid triglyceride level is elevated, needs IR  eval for lymphangiogram and surgery eval for  lymphatic repair. xanax 0.25mg BID PRN for anxiety   2/13 s/p paracentesis. Removed 4,700 ml. DVT sonogram LE negative. s/p psychiatry eval  for anxiety/depression. xanax discontinued. Decreased Bupropion to 150 mg PO Daily . Started Duloxetine 30 mg PO BID and  PRN Hydroxyzine 25 mg PO q8h for anxiety  2/14 Hb 6.9 Transfusion with 1 unit of PRBC . FOBT. sodium trended up to 130 . Diuresing well with lasix IV . DVT sonogram - Nonspecific fluid collection adjacent to the left iliac vein.  Recommend further evaluation with contrast enhanced CT scan . has Iodine contrast allergy- . 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  Diuresing well on IV lasix BID. negative balance of  13.6 L . IR eval -repeat paracentesis to trend volume output and  conservative management   with a low fat diet, octreotide, diuretics. Neurosurgery for input regarding chylous ascitis as possible neurosurgical complication or not. CT abdomen done overnight as well - Postop change in the spine.  There is lucency about the bi iliac screws concerning for loosening.  Bony destruction T10 with interbody strut. started on salt tablets 1g BID x 1day and lasix drip 10mg/h x 6h .  spironolactone  increased to 100 mg Qday. Continue IV Lasix. Dietary consulted for  high-protein and low-fat diet with medium-chain triglycerides  diet.  paracentesis 6.6L removed   2/16 Continuing gentle IV lasix with assistance from nephrology, remains fluid overloaded  2/17: continue Lasix infusion, responding well  2/18-2/19:  Please see progress notes from those dates.    2/20: D/w nephrology to transition from IV Lasix to IV bumex for further diuresis, though Na likely optimized as much as possible. D/w NSGY re xray prior to d/c ( sitting upright in the stretcher is fine.) D/w hepatology: IR wanting to defer lymphangiogram at this time.   2/21:  Discussed with plastic surgery; goal for each drain 30 cc or less daily  2/22:  Discussed with Nephrology, will continue albumin infusions at this time.  However, long-term goals unclear as unable to sustain with albumin or even pressor support if stepped up to ICU.  Discussed in depth with Nephrology and hepatology.  Per hepatology, Discussed with Dr Santiago. Not a tx candidate. Please consult Palliative.  2/23:  Discussed with Nephrology, recommend PRBC transfusion with torsemide 40 mg p.o..  Discontinue albumin.  Discussed with Plastic surgery regarding drain management and possible  discharge to LTAC.      Interval History:  Patient denies any dizziness, chest pain, shortness breast, palpitations, abdominal pain, or vomiting.  Patient does report intermittent nausea and reflux.  Patient does report some lower extremity swelling but thinks it has improved.  Patient does not report any worsening abdominal distention.  Patient actually says abdomen feels better following bowel movements induced by laxatives.  Denies any blood in stool or urine.     Review of systems:  Negative except for as mentioned above    Objective:     Vital Signs (Most Recent):  Temp: 98.6 °F (37 °C) (02/23/23 1759)  Pulse: 96 (02/23/23 1932)  Resp: 18 (02/23/23 1759)  BP: 100/63 (02/23/23 1759)  SpO2: 99 % (02/23/23 1759)   Vital Signs (24h Range):  Temp:  [98 °F (36.7 °C)-98.8 °F (37.1 °C)] 98.6 °F (37 °C)  Pulse:  [] 96  Resp:  [17-18] 18  SpO2:  [96 %-100 %] 99 %  BP: ()/(50-63) 100/63     Weight: (!) 142 kg (313 lb 0.9 oz)  Body mass index is 49.03 kg/m².    Intake/Output Summary (Last 24 hours) at 2/23/2023 1951  Last data filed at 2/23/2023 1552  Gross per 24 hour   Intake 468 ml   Output 1045 ml   Net -577 ml        Physical Exam  Vitals and nursing note reviewed.   Constitutional:       General: She is not in acute distress.     Appearance: She is well-developed. She is obese. She is ill-appearing. She is not toxic-appearing or diaphoretic.   HENT:      Head: Normocephalic and atraumatic.      Nose: Nose normal.      Mouth/Throat:      Mouth: Mucous membranes are moist.      Pharynx: Oropharynx is clear.   Eyes:      General: No scleral icterus.  Cardiovascular:      Rate and Rhythm: Normal rate and regular rhythm.      Pulses: Normal pulses.      Heart sounds: Murmur heard.   Pulmonary:      Effort: Pulmonary effort is normal.      Breath sounds: Normal breath sounds.   Abdominal:      General: There is distension.      Palpations: Abdomen is soft.      Tenderness: There is no abdominal tenderness.  There is no guarding or rebound.      Comments: 4 drains in place with serosanguinous fluid   Musculoskeletal:         General: No tenderness.      Right lower leg: Edema present.      Left lower leg: Edema present.   Skin:     General: Skin is warm and dry.      Comments: Chronic venous stasis changes bilateral lower extremities   Neurological:      Mental Status: She is alert and oriented to person, place, and time.   Psychiatric:         Behavior: Behavior normal.       Significant Labs: All pertinent labs within the past 24 hours have been reviewed.      Recent Results (from the past 24 hour(s))   Comprehensive Metabolic Panel    Collection Time: 02/23/23  3:59 AM   Result Value Ref Range    Sodium 130 (L) 136 - 145 mmol/L    Potassium 3.6 3.5 - 5.1 mmol/L    Chloride 94 (L) 95 - 110 mmol/L    CO2 29 23 - 29 mmol/L    Glucose 101 70 - 110 mg/dL    BUN 14 6 - 20 mg/dL    Creatinine 0.5 0.5 - 1.4 mg/dL    Calcium 8.2 (L) 8.7 - 10.5 mg/dL    Total Protein 5.0 (L) 6.0 - 8.4 g/dL    Albumin 2.7 (L) 3.5 - 5.2 g/dL    Total Bilirubin 0.8 0.1 - 1.0 mg/dL    Alkaline Phosphatase 127 55 - 135 U/L    AST 68 (H) 10 - 40 U/L    ALT 34 10 - 44 U/L    Anion Gap 7 (L) 8 - 16 mmol/L    eGFR >60.0 >60 mL/min/1.73 m^2   Magnesium    Collection Time: 02/23/23  3:59 AM   Result Value Ref Range    Magnesium 1.9 1.6 - 2.6 mg/dL   Phosphorus    Collection Time: 02/23/23  3:59 AM   Result Value Ref Range    Phosphorus 2.4 (L) 2.7 - 4.5 mg/dL   CBC Auto Differential    Collection Time: 02/23/23  3:59 AM   Result Value Ref Range    WBC 0.67 (LL) 3.90 - 12.70 K/uL    RBC 2.34 (L) 4.00 - 5.40 M/uL    Hemoglobin 6.7 (L) 12.0 - 16.0 g/dL    Hematocrit 21.4 (L) 37.0 - 48.5 %    MCV 92 82 - 98 fL    MCH 28.6 27.0 - 31.0 pg    MCHC 31.3 (L) 32.0 - 36.0 g/dL    RDW 17.2 (H) 11.5 - 14.5 %    Platelets 38 (LL) 150 - 450 K/uL    MPV 9.9 9.2 - 12.9 fL    Immature Granulocytes CANCELED 0.0 - 0.5 %    Immature Grans (Abs) CANCELED 0.00 - 0.04 K/uL     Lymph # Test Not Performed 1.0 - 4.8 K/uL    Mono # Test Not Performed 0.3 - 1.0 K/uL    Eos # Test Not Performed 0.0 - 0.5 K/uL    Baso # Test Not Performed 0.00 - 0.20 K/uL    nRBC 0 0 /100 WBC    Gran % 35.0 (L) 38.0 - 73.0 %    Lymph % 34.0 18.0 - 48.0 %    Mono % 19.0 (H) 4.0 - 15.0 %    Eosinophil % 2.0 0.0 - 8.0 %    Basophil % 1.0 0.0 - 1.9 %    Bands 6.0 %    Metamyelocytes 3.0 %    Platelet Estimate Decreased (A)     Aniso Slight     Poik Slight     Poly Occasional     Hypo Occasional     Ovalocytes Occasional     Target Cells Occasional     Tear Drop Cells Occasional     Spherocytes Occasional     Differential Method Manual    Protime-INR    Collection Time: 02/23/23  3:59 AM   Result Value Ref Range    Prothrombin Time 13.4 (H) 9.0 - 12.5 sec    INR 1.3 (H) 0.8 - 1.2   Prepare RBC 1 Unit    Collection Time: 02/23/23 12:49 PM   Result Value Ref Range    UNIT NUMBER [id number]     Product Code [id number]     DISPENSE STATUS ISSUED     CODING SYSTEM AVGV177     Unit Blood Type Code 6200     Unit Blood Type A POS     Unit Expiration [id number]     CROSSMATCH INTERPRETATION Compatible    Type & Screen    Collection Time: 02/23/23 12:49 PM   Result Value Ref Range    Group & Rh A POS     Indirect Natalie NEG        Significant Imaging: I have reviewed all pertinent imaging results/findings within the past 24 hours.            Assessment/Plan:      * Weakness of both lower extremities  Thoracic diskitis  ESBL E coli infection    42 year old woman s/p multiple spinal surgeries presented to OSH with possible infection of shoulder. Found to have UTI and E. Coli bacteremia and progressively worse BLE weakness. Transfer[place] [facility] [place]. Underwent Laminectomy T8-T10; Posterior spinal fusion T8-T12; hardware removal and washout on 1/24. Admitted to [facility] postop. On 2/2 underwent T4-pelvis fusion and T9-T10 corpectomies. To complete 8 week course of meropenem per ID for ESBL E coli from bone  culture, end date 3/29.     - neuro checks q4h  - surgical cultures positive for ESBL, meropenem  until 03/29/2023 per ID  - Plastic surgery following for drain management, wound vac discontinued  - appreciate ID and Plastic surgery assistance  - CMP, Mag, Phos, CBC daily  - MM pain regimen: PRN Dilaudid, Tylenol, gabapentin, robaxin, PRN oxycodone  - Aggressive bowel regimen  - PT/OT     Cirrhosis of liver with ascites  Chylous ascites  Hepatitis-C  MELD-Na score: 9 at 2/23/2023  3:59 AM  MELD score: 9 at 2/23/2023  3:59 AM  Calculated from:  Serum Creatinine: 0.5 mg/dL (Using min of 1 mg/dL) at 2/23/2023  3:59 AM  Serum Sodium: 130 mmol/L at 2/23/2023  3:59 AM  Total Bilirubin: 0.8 mg/dL (Using min of 1 mg/dL) at 2/23/2023  3:59 AM  INR(ratio): 1.3 at 2/23/2023  3:59 AM  Age: 42 years      Patient has reportedly refused transplant evaluation in the past.   - Daily CMP and trend LFTs  - Continue Lactulose 20 g TID  - diuresis held due to hyponatremia  - Will need Hepatology follow up outpatient  -2/10   ammonia at 57 . sono abdomen  . tylenol changed to 1000mg q12h with cirrhosis . s/p IR paracentesis.  -2/11no evidence of SBP on paracentesis. 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   -2/13 s/p paracentesis. Removed 4,700 ml. DVT sonogram LE negative. Removed 4,700 ml. DVT sonogram LE negative.   -2/14 Triglycerides in ascitic fluid 387 consistent with chylous ascites.  IR consulted  for lymphangiogram and  eval for  lymphatic repair. IR deferring lymphangiogram at this time.  -2/15  IR eval -repeat paracentesis to trend volume output and  conservative management    .     -Will continue to monitor for further paracentesis need.    -Discussed with hepatology, not a liver transplant candidate.       Normocytic anemia  -likely related to liver disease  -no active blood loss noted by patient or nursing staff  -hemoglobin 6.7 on 02/23, transfuse 1 unit with dose  of Bumex.  Discussed with Nephrology.      Pancytopenia  - acute on chronic  - follows with hematology outpatient  - CBC daily    Hyponatremia  -improved  -appreciate nephrology assistance  -likely 2nd to low effective circulating volume due to cirrhosis with possibly poor solute intake relative to free water intake.  Less likely SIADH.  -better with albumin administration which was discontinued on 02/23.    Hypotension  -multifactorial in the setting of narcotics, liver failure, recurrent chylous ascites, and intermittent diuresis  - given bolus 500 x1 2/19, lactic acid negative  -continues to remain soft, though improved with albumin.  -discussed with nephrology, albumin discontinued 2/23.  - Will continue to monitor blood pressure trend closely and adjust medication regimen as clinically indicated and tolerated      Abdominal pain  2/10  abdominal X ray -  Nonobstructive bowel gas pattern.  No free air.  As before, mild gaseous distension of stomach. ammonia at 57 . sono abdomen with ascitis . simethicone PRN  2/11no evidence of SBP on paracentesis. 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 AFB and Triglyerides on ascitic fluid.      Kyphosis of thoracolumbar region  See weakness    Anxiety and depression  Evaluated by Psychiatry at OSH prior to transfer. Recommended increasing bupropion.  - Continue bupropion 300 mg QD  - Gabapentin 600 mg TID for anxiety and neuropathy  - PRN diazepam 5 mg q6h discontinued  2/13   s/p psychiatry eval  for anxiety/depression. xanax discontinued. Decreased Bupropion to 150 mg PO Daily . Started Duloxetine 30 mg PO BID and  PRN Hydroxyzine 25 mg PO q8h for anxiety    Severe obesity (BMI >= 40)  Body mass index is 48.58 kg/m². Morbid obesity complicates all aspects of disease management from diagnostic modalities to treatment. Weight loss encouraged    Substance use disorder  Denies IV drug use (meth) for past 3 years. Denies  alcohol and tobacco abuse.     Chronic hepatitis C with cirrhosis  see cirrhosis of liver with ascites      VTE Risk Mitigation (From admission, onward)           Ordered     heparin (porcine) injection 5,000 Units  Every 8 hours         02/04/23 0848     IP VTE HIGH RISK PATIENT  Once         01/19/23 2153     Place sequential compression device  Until discontinued         01/19/23 2153     Reason for No Pharmacological VTE Prophylaxis  Once        Question:  Reasons:  Answer:  Physician Provided (leave comment)  Comment:  Potential NSGY procedure    01/19/23 2153                    Discharge Planning   SHIRA: 2/27/2023     Code Status: Partial Code   Is the patient medically ready for discharge?: No    Reason for patient still in hospital (select all that apply): Patient trending condition, Treatment and Pending disposition  Discharge Plan A: Long-term acute care facility (LTAC)   Discharge Delays: None known at this time              Blas Gilman III, MD  Department of Hospital Medicine   [facility] - Telemetry Stepdown

## 2023-02-24 NOTE — PLAN OF CARE
Problem: Adult Inpatient Plan of Care  Goal: Plan of Care Review  Outcome: Ongoing, Progressing     Problem: Adult Inpatient Plan of Care  Goal: Optimal Comfort and Wellbeing  Outcome: Ongoing, Progressing   POC reviewed with patient. VSS. Complains on pain in back, PRN dilaudid given once. No acute changes during shift.

## 2023-02-24 NOTE — ASSESSMENT & PLAN NOTE
Chylous ascites  Hepatitis-C    MELD-Na score: 9 at 2/23/2023  3:59 AM  MELD score: 9 at 2/23/2023  3:59 AM  Calculated from:  Serum Creatinine: 0.5 mg/dL (Using min of 1 mg/dL) at 2/23/2023  3:59 AM  Serum Sodium: 130 mmol/L at 2/23/2023  3:59 AM  Total Bilirubin: 0.8 mg/dL (Using min of 1 mg/dL) at 2/23/2023  3:59 AM  INR(ratio): 1.3 at 2/23/2023  3:59 AM  Age: 42 years      Patient has reportedly refused transplant evaluation in the past.   - Daily CMP and trend LFTs  - Continue Lactulose 20 g TID  - diuresis held due to hyponatremia, resumed 2/24.  - Will need Hepatology follow up outpatient  -2/10   ammonia at 57 . sono abdomen  . tylenol changed to 1000mg q12h with cirrhosis . s/p IR paracentesis.  -2/11no evidence of SBP on paracentesis. 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   -2/13 s/p paracentesis. Removed 4,700 ml. DVT sonogram LE negative. Removed 4,700 ml. DVT sonogram LE negative.   -2/14 Triglycerides in ascitic fluid 387 consistent with chylous ascites.  IR consulted  for lymphangiogram and  eval for  lymphatic repair. IR deferring lymphangiogram at this time.  -2/15  IR eval -repeat paracentesis to trend volume output and  conservative management    .     -Will continue to monitor for further paracentesis need.    -Discussed with hepatology, not a liver transplant candidate.

## 2023-02-24 NOTE — ASSESSMENT & PLAN NOTE
2/23/23  At the time of my visit, patient appears comfortable, denies new complaints.  She appears to be in good spirits and tells me that some of the staples were removed today and that her covid-19 test results were positive today. Her friend, Minor, visited a few days ago and since then she began to have a new cough.  She expresses disappointment because over the last 3 years she has taken every precaution to avoid covid but does say that she does not feel ill with covid symptoms, except for a slight cough. I provided HCPOA document for her review and she confirms that she is still considering designating HCPOA and she is receptive to follow up palliative visit on Monday. No changes in goals of care and she continues to look forward to LTAC transfer.         2/23/23  -Confirms no chest compressions in the event of cardiopulmonary arrest but she would want short-term intubation for respiratory distress not relieved by other measures  -We discussed the importance of designating HCPOA and discussing her wishes with those she is considering designating  -Patient is aware she is hospice eligible but this does not align with her goals of care  -Patient's goals of care: to focus on as much improvement as possible  -Continue current care  -Palliative will continue to follow for ongoing GOC and patient support.     Palliative medicine consult received for goals of care discussion/advance care planning.  Rachel Zazueta is a 42-year-old female admitted for weakness of both lower extremities, hypotension, E.Coli infection, thoracic discitis, chylous ascites, ascites d/t alcoholic cirrhosis, cirrhosis of liver with ascites, acute blood loss anemia, kyphosis of thoracolumbar region, obesity, substance use disorder, pancytopenia.     At the time of my visit, patient resting, denies any current complaints.  She was receptive to visit after I introduced palliative services.  We discussed her life in general, the toll her  medical conditions have taken on her recently, and what is most important to her.  She expresses very good understanding of the significance of her condition.  She is able to tell me about her medical history, the events that lead to current hospitalization, and tells me that she understands she is not a candidate for a liver transplant and she understands this is a life-limiting condition.  While she understands the significance of this, she wants to focus on as much improvement as possible. She wants to live as much as possible and wants to try her best to have more time to do things that are on her bucket list.  She watched her mother die of liver cancer and understands that she may not get to do all the things she wants and may not achieve the improvement she wants but it is important to her to try. We discussed hospice as an option and she is aware of the services, philosophy, and benefits of hospice as her mother was enrolled in hospice care prior to her death. She acknowledges that at some point, hospice may be the only or best option, but for now, she wants to continue current care with LTAC as the next step as she has discussed with the primary team.     We discussed her support system and those that help her cope.  She has friends that she leans on but one of the closest people to her is her friend, Minor Reynoso Jr.  They have been in a relationship for 2 1/2 years but during this hospitalization have broken up, although he remains a close friend and very important to her.     Advance Care Planning     Date: 02/23/2023    Power of   I initiated the process of advance care planning today and explained the importance of this process to the patient.  We discussed the importance of designating a Healthcare Power of  in the event she would be unable to make medical decisions for herself.  At this time, patient believes that she would want Minor Reynoso Jr to make medical decisions for her and she  has other friends she may want to designate.  She would like to consider later today and is receptive to follow up tomorrow.     We reviewed current code status.  Patient confirms NO CHEST COMPRESSIONS in the event of cardiopulmonary arrest.  In the past, she has stated that she would not want intubation but CONFIRMS PARTIAL CODE STATUS. SHE WOULD WANT SHORT-TERM INTUBATION FOR RESPIRATORY DISTRESS NOT RELIEVED BY OTHER MEASURES. We discussed the importance of discussing her wishes with those she is considering designating HCPOA.      Palliative will continue to follow for ongoing GOC discussions and patient support.

## 2023-02-24 NOTE — ASSESSMENT & PLAN NOTE
42 y.o F w/ hx of IV drug use (meth), chronic HCV with cirrhosis, spinal fusion (04/2021), epidural abscess with removal of hardware (02/2022), spinal fusion with hardware (T10 - Pelvis / 03/2022), and neurogenic bladder here with complicated ESBL E.coli epidural abscess s/p washout,corpectomy, fixation being treated w/ meropenem.     Nephrology consulted for hyponatremia. Patient reports uncomfortable abdominal distension and leg swelling. Reports drinking 3-4 cups of her 30oz water tumbler daily. Urine Na <10, urine osm 513. Serum osm 283. Serum Na stable. Normal mentation.    Hyponatremia likely 2/2 to low effective circulating volume 2/2 cirrhosis. Possible poor solute intake relative to free water intake. Unlikely SIADH given low urine Na.   Repeat urine studies 2/14 suggestive of appropriate ADH in setting of cirrhosis    Continue bumex 1 mg PO

## 2023-02-24 NOTE — PT/OT/SLP PROGRESS
Physical Therapy Treatment    Patient Name:  Rachel Zazueta   MRN:  [id number]    Recommendations:     Discharge Recommendations: nursing facility, skilled  Discharge Equipment Recommendations: hospital bed, lift device, wheelchair  Barriers to discharge: Inaccessible home, Decreased caregiver support, and impaired functional mobility requiring increased assistance    Assessment:     Rachel Zazueta is a 42 y.o. female admitted with a medical diagnosis of Weakness of both lower extremities.  She presents with the following impairments/functional limitations: weakness, impaired endurance, impaired self care skills, impaired functional mobility, gait instability, impaired balance, orthopedic precautions, decreased lower extremity function, decreased safety awareness, pain, decreased ROM.    Pt tolerates session well with focus on bed mobility, transfers, therex, and EOB balance/endurance. Pt progressing well with significant improvement in sitting balance, posture at EOB, and pt able to advance to full upright stand this day. Pt continues to require significant physical assistance to mobilize but does demonstrate trending improvement. Pt progressing slowly towards therapy goals. Pt will continue to benefit from therapy services to address impairments listed above.     Rehab Prognosis: Good; patient would benefit from acute skilled PT services to address these deficits and reach maximum level of function.    Recent Surgery: Procedure(s) (LRB):  LAMINECTOMY, SPINE, THORACIC, WITH FUSION (T4-Pelvis fusion w/ T9-10 corpectomies) **AIRO (N/A) 22 Days Post-Op    Plan:     During this hospitalization, patient to be seen 3 x/week to address the identified rehab impairments via therapeutic activities, therapeutic exercises, neuromuscular re-education and progress toward the following goals:    Plan of Care Expires:  03/05/23    Subjective     Chief Complaint: discomfort and fatigue  Patient/Family Comments/goals:  ""Palliative came by to talk to me yesterday."  Pain/Comfort:  Pain Rating 1: 0/10  Pain Rating Post-Intervention 1: 0/10      Objective:     Communicated with RN prior to session.  Patient found supine with telemetry, PureWick, MIHIR drain upon PTA entry to room.   Therapy technician present to assist throughout    General Precautions: Standard, contact, fall, airborne, droplet   Orthopedic Precautions: spinal precautions  Braces: TLSO  Respiratory Status: Room air     Functional Mobility:  Bed Mobility:     Rolling Left:  maximal assistance  Supine to Sit: maximal assistance and of 2 persons  Sit to Supine: maximal assistance and of 2 persons  Transfers:     Sit to Stand:  maximal assistance and of 2 persons with hand-held assist; x 2 attempts, 1 successful stand fully upright, B knee blocking and assist to maintain hip extension required.   Gait: unable to advance to gait d/t limited standing endurance and assistance needed to maintain stance      AM-PAC 6 CLICK MOBILITY  Turning over in bed (including adjusting bedclothes, sheets and blankets)?: 2  Sitting down on and standing up from a chair with arms (e.g., wheelchair, bedside commode, etc.): 2  Moving from lying on back to sitting on the side of the bed?: 2  Moving to and from a bed to a chair (including a wheelchair)?: 1  Need to walk in hospital room?: 1  Climbing 3-5 steps with a railing?: 1  Basic Mobility Total Score: 9       Treatment & Education:  Pt assisted at trunk with cues for core/trunk activation for upright posture and midline balance while pt performs self-ADLs. Additional cues for anterior weight shift as pt with tendency for posterior lean. SBA to Mod A dependent on level of UE support available.   Increased time spent in encouragement and therapeutic listening to improve patients comfort and participation in therapy interventions.     Patient left HOB elevated with all lines intact, call button in reach, and RN notified..    GOALS: "   Multidisciplinary Problems       Physical Therapy Goals          Problem: Physical Therapy    Goal Priority Disciplines Outcome Goal Variances Interventions   Physical Therapy Goal     PT, PT/OT Ongoing, Progressing     Description: Goals to be met by: 23, extended goals to      Patient will increase functional independence with mobility by performin. Supine to sit with Moderate Assistance  2. Sit to supine with Moderate Assistance  3. Rolling to Left and Right with Moderate Assistance.  4. Sit to stand transfer with Maximum Assistance  5. Sit edge of bed with ANTONIO UE support for 8 min with contact guard assist to prepare for functional activities in sitting.                          Time Tracking:     PT Received On: 23  PT Start Time: 1406     PT Stop Time: 1452  PT Total Time (min): 46 min     Billable Minutes: Therapeutic Activity 46    Treatment Type: Treatment  PT/PTA: PTA     PTA Visit Number: 2023

## 2023-02-24 NOTE — PLAN OF CARE
[facility] - Telemetry Stepdown  Discharge Reassessment    Primary Care Provider: Dannielle Merino DO    Expected Discharge Date: 2/27/2023    Reassessment (most recent)       Discharge Reassessment - 02/24/23 1221          Discharge Reassessment    Assessment Type Discharge Planning Reassessment     Did the patient's condition or plan change since previous assessment? No     Discharge Plan A Long-term acute care facility (LTAC)     Discharge Plan B Skilled Nursing Facility     DME Needed Upon Discharge  none     Discharge Barriers Identified None     Why the patient remains in the hospital Requires continued medical care        Post-Acute Status    Post-Acute Authorization Placement     Post-Acute Placement Status Pending payor review/awaiting authorization (if required)     Discharge Delays None known at this time                 Per attending MD, plastics team removed half of patient's staples and plan to remove more in a few days.    Spoke with Elyssa ([phone]) at [facility] who reports they will re-eval patient on Monday prior to resubmitting for insurance auth. [facility] accepts covid+ patients. Patient will need to follow up with plastics within 1-2 weeks. Per Elyssa, they should be able to bring patient to her f/u appt in 2 weeks. Per plastics, they do not have a clinic in the [facility].    Milla Hagan, LCSW [facility]  Ext. [phone]

## 2023-02-24 NOTE — PROGRESS NOTES
Plastic and Reconstructive Surgery   Progress Note    Subjective:    Patient seen and examined at bedside. No acute events overnight, no complaints. Half of staples removed today.    Objective:  Vital signs in last 24 hours:  Temp:  [97.5 °F (36.4 °C)-98.7 °F (37.1 °C)] 98.7 °F (37.1 °C)  Pulse:  [] 94  Resp:  [16-19] 19  SpO2:  [93 %-100 %] 100 %  BP: ()/(48-63) 111/56    Intake/Output last 3 shifts:  I/O last 3 completed shifts:  In: 768 [P.O.:418; Blood:350]  Out: 2595 [Urine:1800; Drains:795]    Intake/Output this shift:  I/O this shift:  In: -   Out: 110 [Drains:110]        Physical Exam:  VITAL SIGNS:   Vitals:    23 0745 23 0829 23 1130 23 1131   BP: (!) 123/57  (!) 111/56    BP Location:   Right arm    Patient Position:   Lying    Pulse: 100  92 94   Resp: 18 16 19    Temp: 98.1 °F (36.7 °C)  98.7 °F (37.1 °C)    TempSrc: Oral  Oral    SpO2: 98%  100%    Weight:       Height:         TMAX: Temp (24hrs), Av.3 °F (36.8 °C), Min:97.5 °F (36.4 °C), Max:98.7 °F (37.1 °C)    General: Alert; No acute distress  Cardiovascular: Regular rate   Respiratory: Normal respiratory effort. Chest rise symmetric.   Abdomen: Soft, nontender, nondistended  Extremity: Moves all extremities equally.  Neurologic: No focal deficit. Speech normal  Spine incision is intact. Half of Staples still in place.no signs of infection      Scheduled Medications acetaminophen, 1,000 mg, Q12H  bisacodyL, 10 mg, Every other day  bumetanide, 1 mg, Daily  buPROPion, 150 mg, Daily  DULoxetine, 30 mg, BID  gabapentin, 900 mg, TID  guaiFENesin, 600 mg, BID  heparin (porcine), 5,000 Units, Q8H  lactulose, 20 g, TID  meropenem (MERREM) IVPB, 2 g, Q8H  methocarbamoL, 1,000 mg, Q6H  pantoprazole, 40 mg, Daily  [START ON 2023] potassium chloride, 10 mEq, Once  potassium chloride, 40 mEq, Q4H  sodium chloride 0.9%, 10 mL, Q6H      PRN Medications sodium chloride, benzonatate, bumetanide, calcium carbonate,  dextrose 10%, dextrose 10%, diphenhydrAMINE, glucagon (human recombinant), glucose, glucose, hydrALAZINE, HYDROmorphone, hydrOXYzine HCL, melatonin, naloxone, ondansetron, oxyCODONE **AND** oxyCODONE, polyethylene glycol, predniSONE, prochlorperazine, senna-docusate 8.6-50 mg, simethicone, Flushing PICC Protocol **AND** sodium chloride 0.9% **AND** sodium chloride 0.9%    Recent Labs:   Lab Results   Component Value Date    WBC 0.74 (LL) 02/24/2023    HGB 7.5 (L) 02/24/2023    HCT 24.0 (L) 02/24/2023    MCV 93 02/24/2023    PLT 37 (LL) 02/24/2023     Lab Results   Component Value Date     (H) 02/24/2023     (L) 02/24/2023    K 3.4 (L) 02/24/2023    CL 93 (L) 02/24/2023    BUN 12 02/24/2023         Assessment: 42 y.o. y/o female 22 Days Post-Op s/p Procedure(s):  LAMINECTOMY, SPINE, THORACIC, WITH FUSION (T4-Pelvis fusion w/ T9-10 corpectomies) **AIRO Doing well postoperatively.  Drains with SSO , decreasing output    Plan  -Continue to monitor drain output  -Pain control  - half of staples removed today  -Rest of care per primary

## 2023-02-24 NOTE — PROGRESS NOTES
[facility] - Telemetry [facility] Medicine  Progress Note    Patient Name: Rachel Zazueta  MRN: [id number]  Patient Class: IP- Inpatient   Admission Date: 1/19/2023  Length of Stay: 36 days  Attending Physician: Blas Gilman III,*  Primary Care Provider: Dannielle Merino DO        Subjective:     Principal Problem:Weakness of both lower extremities        HPI:  Ms. Zazueta is a 42 y.o F w/ hx ofIV drug use (methamphetamine), chronic HCV with cirrhosis, spinal fusion (04/2021), epidural abscess with removal of hardware (02/2022), spinal fusion with hardware (T10 - Pelvis / 03/2022), obesity (BMI 39.3) and neurogenic bladder who initially presented to [facility] for evaluation of back pain and left leg weakness.  She reports initially noting the left leg weakness when she was about to go to the bathroom and was about to fall but was assisted by her boyfriend.  She was brought to the ED via EMS.  Urinalysis with UTI concerns and blood cultures at OSH grew ESBL E coli so she was treated with meropenem.  During hospitalization, she reports the weakness that started out in her left leg initially then spread upwards to her left thigh, left hip, then crossed over to the right hip and spread to her right leg.  Denies recent IV drug use. She reports her last incidence of drug use was more than 3 years ago and also denies tobacco, and alcohol abuse.  Reports cannabis use.    She also reports more than 10 years ago she had an episode of a headache that lasted about 4 days and was associated with residual defects of a strabismus in the left eye with associated visual disturbances.  She also reports over the past few years her friends have noticed that she sometimes has word-finding difficulty during conversations.     Reports shortness of breath when sitting up, fever, chills, back pain, lower extremity weakness(initally L>R, but now R>L), diarrhea.  Denies cough, nausea, vomiting, constipation, bladder  or bowel incontinence, dysuria, dark urine, new vision changes.    OSH course notable for concerning urinalysis and blood cultures positive for ESBL E coli treated with meropenem.  She was also admit to the ICU where she was treated with Precedex for significant body aches, irritability, and muscle spasms.  Concerns of a murmur on physical exam so she underwent TTE which did not show any vegetations.  Worsening lower extremity weakness workup with MRI head nonspecific, MRI cervical spine with multilevel spondylosis, X-ray spine with multilevel thoracolumbar fusion and diskectomy, focal kyphosis at T9-10 increased compared to prior.  She was started on prophylaxis amoxicillin due to her history of infected hardware.  MRI spine unable to be completed due to patient's body habitus so she was transferred to [facility] for further imaging and Neurosurgery, Neurology evaluation.      Overview/Hospital Course:  Pt admitted as a transfer from [facility] for worsening LE weakness, concern for spinal infection, and need for MRI which could not be done at OSH. Imaging was completed here. Worsening proximal junctional kyphosis noted at T9-10 noted with concern for discitis at T8-9, T9-10. NSGY evaluated.     Found to have T8-T10 kyphosis on MRI. 1/24 underwent laminectomy T8-T10; posterior fusion T8-T12; and washout. 2/2 underwent T4-pelvis fusion and T9-T10 corpectomy.  ID consulted for thoracic discitis infection.  Patient to complete 8 weeks of therapy with meropenem. Patient underwent flap closure with Plastic surgery.  Step-down Hospital Medicine on 02/08.    2/9   On meropenem for MDR-ESCHERICHIA COLI ESBL  sodium trended down to 129.  Neurosurgery following post OR and aiding in pain management. PCA discontinued 2/8 . On oxycodone 10mg PO q4h . requiring IV dilaudid q4 as needed,. drains to gravity output 990ml/24h . Plastic surgery following flap, managing wounds.  Wound VAC discontinued on Wednesday.  accepted to [facility] pain  management consulted for back ache- switched to multimodal therapy. sodium trended down to 127.   2/10 sodium stable at 127 . continues with increased drain output 985ml/24h . negative balance of  2.8 L lasix on hold.  nephrology consulted. abdominal X ray -  Nonobstructive bowel gas pattern.  No free air.  As before, mild gaseous distension of stomach. ammonia at 57 . sono abdomen  . tylenol changed to 1000mg q12h with cirrhosis . sono abdomen -Cirrhotic liver morphology.  Sequela of portal hypertension including moderate ascites, recanalized umbilical vein.  No focal hepatic lesions. Cholecystectomy. . Nephrology recs  fluid restriction to 1 L,  lasix 60 mg IV daily,  and trend sodium q12h.  diazepam discontinued . s/p IR paracentesis today   2/11no evidence of SBP on paracentesis. 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 AFB and Triglyerides on ascitic fluid.  noncompliant with  fluid restriction to 1 L despite counseling. sodium trended down to 126 .received 2 doses of IV lasix 60mg. drain output 250ml. Increased Lasix IV 80 BID  due to UOP 500ml/24h   2/12 UOP of 1250ml/24h. K and P replaced. sodium trended up to 129.  hepatology eval -If ascitic fluid triglyceride level is elevated, needs IR  eval for lymphangiogram and surgery eval for  lymphatic repair. xanax 0.25mg BID PRN for anxiety   2/13 s/p paracentesis. Removed 4,700 ml. DVT sonogram LE negative. s/p psychiatry eval  for anxiety/depression. xanax discontinued. Decreased Bupropion to 150 mg PO Daily . Started Duloxetine 30 mg PO BID and  PRN Hydroxyzine 25 mg PO q8h for anxiety  2/14 Hb 6.9 Transfusion with 1 unit of PRBC . FOBT. sodium trended up to 130 . Diuresing well with lasix IV . DVT sonogram - Nonspecific fluid collection adjacent to the left iliac vein.  Recommend further evaluation with contrast enhanced CT scan . has Iodine contrast allergy- . 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  Diuresing well on IV lasix BID. negative balance of  13.6 L . IR eval -repeat paracentesis to trend volume output and  conservative management   with a low fat diet, octreotide, diuretics. Neurosurgery for input regarding chylous ascitis as possible neurosurgical complication or not. CT abdomen done overnight as well - Postop change in the spine.  There is lucency about the bi iliac screws concerning for loosening.  Bony destruction T10 with interbody strut. started on salt tablets 1g BID x 1day and lasix drip 10mg/h x 6h .  spironolactone  increased to 100 mg Qday. Continue IV Lasix. Dietary consulted for  high-protein and low-fat diet with medium-chain triglycerides  diet.  paracentesis 6.6L removed   2/16 Continuing gentle IV lasix with assistance from nephrology, remains fluid overloaded  2/17: continue Lasix infusion, responding well  2/18-2/19:  Please see progress notes from those dates.    2/20: D/w nephrology to transition from IV Lasix to IV bumex for further diuresis, though Na likely optimized as much as possible. D/w NSGY re xray prior to d/c ( sitting upright in the stretcher is fine.) D/w hepatology: IR wanting to defer lymphangiogram at this time.   2/21:  Discussed with plastic surgery; goal for each drain 30 cc or less daily  2/22:  Discussed with Nephrology, will continue albumin infusions at this time.  However, long-term goals unclear as unable to sustain with albumin or even pressor support if stepped up to ICU.  Discussed in depth with Nephrology and hepatology.  Per hepatology, Discussed with Dr Santiago. Not a tx candidate. Please consult Palliative.  2/23:  Discussed with Nephrology, recommend PRBC transfusion with torsemide 40 mg p.o..  Discontinue albumin.  Discussed with Plastic surgery regarding drain management and possible  discharge to LTAC.  2/24:  New cough and some congestion, patient does have positive for COVID.  Discussed with patient and remdesivir initiated.  Still plan on discharge to LTAC following suture removal.  Discussed with Plastic surgery.  Patient can follow up in Plastic surgery Clinic on 03/08/2023.  Blood pressure stabilized and Bumex was started following discussion with Nephrology.      Interval History:  Patient denies any worsening abdominal distention.  Denies any abdominal pain or vomiting.  Patient does report intermittent nausea although this has improved.  Overnight, patient has developed a new cough with minimal sputum production.  Patient denies any shortness of breath, fever, chills.  Patient does tell me that her significant other visited the other day and now has similar symptoms.    Review of systems:  Negative except for as mentioned above    Objective:     Vital Signs (Most Recent):  Temp: 99 °F (37.2 °C) (02/24/23 1530)  Pulse: 91 (02/24/23 1530)  Resp: 19 (02/24/23 1530)  BP: (!) 102/52 (02/24/23 1530)  SpO2: 97 % (02/24/23 1530)   Vital Signs (24h Range):  Temp:  [97.5 °F (36.4 °C)-99 °F (37.2 °C)] 99 °F (37.2 °C)  Pulse:  [] 91  Resp:  [16-19] 19  SpO2:  [93 %-100 %] 97 %  BP: ()/(48-63) 102/52     Weight: (!) 142 kg (313 lb 0.9 oz)  Body mass index is 49.03 kg/m².    Intake/Output Summary (Last 24 hours) at 2/24/2023 1702  Last data filed at 2/24/2023 1454  Gross per 24 hour   Intake 300 ml   Output 2930 ml   Net -2630 ml        Physical Exam  Vitals and nursing note reviewed.   Constitutional:       General: She is not in acute distress.     Appearance: She is well-developed. She is obese. She is not toxic-appearing or diaphoretic.   HENT:      Head: Normocephalic and atraumatic.      Nose: Nose normal.      Mouth/Throat:      Mouth: Mucous membranes are moist.      Pharynx: Oropharynx is clear.   Eyes:      General: No scleral icterus.  Cardiovascular:      Rate and Rhythm:  Normal rate and regular rhythm.      Pulses: Normal pulses.      Heart sounds: Murmur heard.   Pulmonary:      Effort: Pulmonary effort is normal.      Breath sounds: Normal breath sounds. No wheezing, rhonchi or rales.   Abdominal:      General: There is distension.      Palpations: Abdomen is soft.      Tenderness: There is no abdominal tenderness. There is no guarding or rebound.      Comments: 4 drains in place with serosanguinous fluid   Musculoskeletal:         General: No tenderness.      Right lower leg: Edema present.      Left lower leg: Edema present.   Skin:     General: Skin is warm and dry.      Comments: Chronic venous stasis changes bilateral lower extremities   Neurological:      Mental Status: She is alert and oriented to person, place, and time.   Psychiatric:         Behavior: Behavior normal.       Significant Labs: All pertinent labs within the past 24 hours have been reviewed.      Recent Results (from the past 24 hour(s))   Comprehensive Metabolic Panel    Collection Time: 02/24/23  5:57 AM   Result Value Ref Range    Sodium 131 (L) 136 - 145 mmol/L    Potassium 3.4 (L) 3.5 - 5.1 mmol/L    Chloride 93 (L) 95 - 110 mmol/L    CO2 32 (H) 23 - 29 mmol/L    Glucose 120 (H) 70 - 110 mg/dL    BUN 12 6 - 20 mg/dL    Creatinine 0.5 0.5 - 1.4 mg/dL    Calcium 8.2 (L) 8.7 - 10.5 mg/dL    Total Protein 5.2 (L) 6.0 - 8.4 g/dL    Albumin 2.9 (L) 3.5 - 5.2 g/dL    Total Bilirubin 1.5 (H) 0.1 - 1.0 mg/dL    Alkaline Phosphatase 126 55 - 135 U/L    AST 62 (H) 10 - 40 U/L    ALT 33 10 - 44 U/L    Anion Gap 6 (L) 8 - 16 mmol/L    eGFR >60.0 >60 mL/min/1.73 m^2   Magnesium    Collection Time: 02/24/23  5:57 AM   Result Value Ref Range    Magnesium 1.8 1.6 - 2.6 mg/dL   Phosphorus    Collection Time: 02/24/23  5:57 AM   Result Value Ref Range    Phosphorus 2.3 (L) 2.7 - 4.5 mg/dL   CBC Auto Differential    Collection Time: 02/24/23  5:57 AM   Result Value Ref Range    WBC 0.74 (LL) 3.90 - 12.70 K/uL    RBC 2.59  (L) 4.00 - 5.40 M/uL    Hemoglobin 7.5 (L) 12.0 - 16.0 g/dL    Hematocrit 24.0 (L) 37.0 - 48.5 %    MCV 93 82 - 98 fL    MCH 29.0 27.0 - 31.0 pg    MCHC 31.3 (L) 32.0 - 36.0 g/dL    RDW 16.8 (H) 11.5 - 14.5 %    Platelets 37 (LL) 150 - 450 K/uL    MPV 9.8 9.2 - 12.9 fL    Immature Granulocytes 0.0 0.0 - 0.5 %    Gran # (ANC) 0.4 (L) 1.8 - 7.7 K/uL    Immature Grans (Abs) 0.00 0.00 - 0.04 K/uL    Lymph # 0.2 (L) 1.0 - 4.8 K/uL    Mono # 0.1 (L) 0.3 - 1.0 K/uL    Eos # 0.0 0.0 - 0.5 K/uL    Baso # 0.01 0.00 - 0.20 K/uL    nRBC 0 0 /100 WBC    Gran % 51.3 38.0 - 73.0 %    Lymph % 27.0 18.0 - 48.0 %    Mono % 18.9 (H) 4.0 - 15.0 %    Eosinophil % 1.4 0.0 - 8.0 %    Basophil % 1.4 0.0 - 1.9 %    Platelet Estimate Decreased (A)     Differential Method Automated    Protime-INR    Collection Time: 02/24/23  5:57 AM   Result Value Ref Range    Prothrombin Time 13.4 (H) 9.0 - 12.5 sec    INR 1.3 (H) 0.8 - 1.2   COVID-19 Routine Screening    Collection Time: 02/24/23  9:55 AM   Result Value Ref Range    SARS-CoV2 (COVID-19) Qualitative PCR Detected (A) Not Detected   SARS-Cov2 (COVID) with FLU/RSV by PCR    Collection Time: 02/24/23  9:55 AM   Result Value Ref Range    SARS-CoV2 (COVID-19) Qualitative PCR Detected (A) Not Detected    Influenza A, Molecular Not Detected Not Detected    Influenza B, Molecular Not Detected Not Detected    RSV Ag by Molecular Method Not Detected Not Detected       Significant Imaging: I have reviewed all pertinent imaging results/findings within the past 24 hours.              Assessment/Plan:      * Weakness of both lower extremities  Thoracic diskitis  ESBL E coli infection    42 year old woman s/p multiple spinal surgeries presented to OSH with possible infection of shoulder. Found to have UTI and E. Coli bacteremia and progressively worse BLE weakness. Transferred to [facility] for neurosurgical evaluation. Underwent Laminectomy T8-T10; Posterior spinal fusion T8-T12; hardware removal and washout on  1/24. Admitted to [facility] postop. On 2/2 underwent T4-pelvis fusion and T9-T10 corpectomies. To complete 8 week course of meropenem per ID for ESBL E coli from bone culture, end date 3/29.     - neuro checks q4h  - surgical cultures positive for ESBL, meropenem  until 03/29/2023 per ID  - Plastic surgery following for drain management, wound vac discontinued  - appreciate ID and Plastic surgery assistance  - CMP, Mag, Phos, CBC daily  - MM pain regimen: PRN Dilaudid, Tylenol, gabapentin, robaxin, PRN oxycodone  - Aggressive bowel regimen  - PT/OT     Thoracic discitis  See Weakness of both lower extremities    Cirrhosis of liver with ascites  Chylous ascites  Hepatitis-C    MELD-Na score: 9 at 2/23/2023  3:59 AM  MELD score: 9 at 2/23/2023  3:59 AM  Calculated from:  Serum Creatinine: 0.5 mg/dL (Using min of 1 mg/dL) at 2/23/2023  3:59 AM  Serum Sodium: 130 mmol/L at 2/23/2023  3:59 AM  Total Bilirubin: 0.8 mg/dL (Using min of 1 mg/dL) at 2/23/2023  3:59 AM  INR(ratio): 1.3 at 2/23/2023  3:59 AM  Age: 42 years      Patient has reportedly refused transplant evaluation in the past.   - Daily CMP and trend LFTs  - Continue Lactulose 20 g TID  - diuresis held due to hyponatremia, resumed 2/24.  - Will need Hepatology follow up outpatient  -2/10   ammonia at 57 . sono abdomen  . tylenol changed to 1000mg q12h with cirrhosis . s/p IR paracentesis.  -2/11no evidence of SBP on paracentesis. 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   -2/13 s/p paracentesis. Removed 4,700 ml. DVT sonogram LE negative. Removed 4,700 ml. DVT sonogram LE negative.   -2/14 Triglycerides in ascitic fluid 387 consistent with chylous ascites.  IR consulted  for lymphangiogram and  eval for  lymphatic repair. IR deferring lymphangiogram at this time.  -2/15  IR eval -repeat paracentesis to trend volume output and  conservative management    .     -Will continue to monitor for further paracentesis  need.    -Discussed with hepatology, not a liver transplant candidate.       COVID-19  -new cough reported 2/24 with congestion  -COVID swab positive  -discussed risks and benefits and patient has elected to start remdesivir.  -not requiring oxygen so no steroids at this time.  -monitor closely for signs of worsening infection.  -chest x-ray pending  -continue incentive spirometer.  otherwise symptomatic treatment    Normocytic anemia  -likely related to liver disease  -no active blood loss noted by patient or nursing staff  -hemoglobin 6.7 on 02/23, transfuse 1 unit with dose of Bumex.  Discussed with Nephrology.      Pancytopenia  - acute on chronic  - follows with hematology outpatient  - CBC daily    Hyponatremia  -improved  -appreciate nephrology assistance  -likely 2nd to low effective circulating volume due to cirrhosis with possibly poor solute intake relative to free water intake.  Less likely SIADH.  -better with albumin administration which was discontinued on 02/23.    Hypotension  -multifactorial in the setting of narcotics, liver failure, recurrent chylous ascites, and intermittent diuresis  - given bolus 500 x1 2/19, lactic acid negative  -continues to remain soft, though improved with albumin.  -discussed with nephrology, albumin discontinued 2/23.  - Will continue to monitor blood pressure trend closely and adjust medication regimen as clinically indicated and tolerated      Discharge planning issues  -[facility] in [place] has accepted following staple removal.  -discussed with plastic surgery who plan to remove staples next few days.  -once discharged to LTAC, patient will need outpatient follow-up with Plastic surgery on 3/8/23.      Abdominal pain  2/10  abdominal X ray -  Nonobstructive bowel gas pattern.  No free air.  As before, mild gaseous distension of stomach. ammonia at 57 . sono abdomen with ascitis . simethicone PRN  2/11no evidence of SBP on paracentesis. hepatology consulted for  Ultrasound-guided paracentesis with drainage of 5000 mL of chylous fluid. AFB and Triglyerides on ascitic fluid.      Kyphosis of thoracolumbar region  See weakness    Anxiety and depression  Evaluated by Psychiatry at OSH prior to transfer. Recommended increasing bupropion.  - Continue bupropion 300 mg QD  - Gabapentin 600 mg TID for anxiety and neuropathy  - PRN diazepam 5 mg q6h discontinued  2/13   s/p psychiatry eval  for anxiety/depression. xanax discontinued. Decreased Bupropion to 150 mg PO Daily . Started Duloxetine 30 mg PO BID and  PRN Hydroxyzine 25 mg PO q8h for anxiety    Severe obesity (BMI >= 40)  Body mass index is 48.58 kg/m². Morbid obesity complicates all aspects of disease management from diagnostic modalities to treatment. Weight loss encouraged    Substance use disorder  Denies IV drug use (meth) for past 3 years. Denies alcohol and tobacco abuse.     Chronic hepatitis C with cirrhosis  see cirrhosis of liver with ascites      VTE Risk Mitigation (From admission, onward)         Ordered     heparin (porcine) injection 5,000 Units  Every 8 hours         02/04/23 0848     IP VTE HIGH RISK PATIENT  Once         01/19/23 2153     Place sequential compression device  Until discontinued         01/19/23 2153     Reason for No Pharmacological VTE Prophylaxis  Once        Question:  Reasons:  Answer:  Physician Provided (leave comment)  Comment:  Potential NSGY procedure    01/19/23 2153                Discharge Planning   SHIRA: 2/27/2023     Code Status: Partial Code   Is the patient medically ready for discharge?: No    Reason for patient still in hospital (select all that apply): Patient new problem, Consult recommendations and Pending disposition  Discharge Plan A: Long-term acute care facility (LTAC)   Discharge Delays: None known at this time              Blas Gilman III, MD  Department of Hospital Medicine   [facility] - Telemetry Stepdown

## 2023-02-24 NOTE — SUBJECTIVE & OBJECTIVE
Hospital problems/plan of care as per primary team:   Weakness of both lower extremities  Thoracic diskitis  ESBL E coli infection     42 year old woman s/p multiple spinal surgeries presented to OSH with possible infection of shoulder. Found to have UTI and E. Coli bacteremia and progressively worse BLE weakness. Transferred to [facility] [place] for neurosurgical evaluation. Underwent Laminectomy T8-T10; Posterior spinal fusion T8-T12; hardware removal and washout on 1/24. Admitted to [facility] postop. On 2/2 underwent T4-pelvis fusion and T9-T10 corpectomies. To complete 8 week course of meropenem per ID for ESBL E coli from bone culture, end date 3/29.     - neuro checks q4h  - surgical cultures positive for ESBL, meropenem  until 03/29/2023 per ID  - Plastic surgery following for drain management, wound vac discontinued  - appreciate ID and Plastic surgery assistance  - CMP, Mag, Phos, CBC daily  - MM pain regimen: PRN Dilaudid, Tylenol, gabapentin, robaxin, PRN oxycodone  - Aggressive bowel regimen  - PT/OT      Cirrhosis of liver with ascites  Chylous ascites  Hepatitis-C  MELD-Na score: 9 at 2/23/2023  3:59 AM  MELD score: 9 at 2/23/2023  3:59 AM  Calculated from:  Serum Creatinine: 0.5 mg/dL (Using min of 1 mg/dL) at 2/23/2023  3:59 AM  Serum Sodium: 130 mmol/L at 2/23/2023  3:59 AM  Total Bilirubin: 0.8 mg/dL (Using min of 1 mg/dL) at 2/23/2023  3:59 AM  INR(ratio): 1.3 at 2/23/2023  3:59 AM  Age: 42 years        Patient has reportedly refused transplant evaluation in the past.   - Daily CMP and trend LFTs  - Continue Lactulose 20 g TID  - diuresis held due to hyponatremia  - Will need Hepatology follow up outpatient  -2/10   ammonia at 57 . sono abdomen  . tylenol changed to 1000mg q12h with cirrhosis . s/p IR paracentesis.  -2/11no evidence of SBP on paracentesis. 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   -2/13 s/p paracentesis. Removed 4,700 ml. DVT sonogram LE  negative. Removed 4,700 ml. DVT sonogram LE negative.   -2/14 Triglycerides in ascitic fluid 387 consistent with chylous ascites.  IR consulted  for lymphangiogram and  eval for  lymphatic repair. IR deferring lymphangiogram at this time.  -2/15  IR eval -repeat paracentesis to trend volume output and  conservative management    .      -Will continue to monitor for further paracentesis need.    -Discussed with hepatology, not a liver transplant candidate.         Normocytic anemia  -likely related to liver disease  -no active blood loss noted by patient or nursing staff  -hemoglobin 6.7 on 02/23, transfuse 1 unit with dose of Bumex.  Discussed with Nephrology.        Pancytopenia  - acute on chronic  - follows with hematology outpatient  - CBC daily     Hyponatremia  -improved  -appreciate nephrology assistance  -likely 2nd to low effective circulating volume due to cirrhosis with possibly poor solute intake relative to free water intake.  Less likely SIADH.  -better with albumin administration which was discontinued on 02/23.     Hypotension  -multifactorial in the setting of narcotics, liver failure, recurrent chylous ascites, and intermittent diuresis  - given bolus 500 x1 2/19, lactic acid negative  -continues to remain soft, though improved with albumin.  -discussed with nephrology, albumin discontinued 2/23.  - Will continue to monitor blood pressure trend closely and adjust medication regimen as clinically indicated and tolerated        Abdominal pain  2/10  abdominal X ray -  Nonobstructive bowel gas pattern.  No free air.  As before, mild gaseous distension of stomach. ammonia at 57 . sono abdomen with ascitis . simethicone PRN  2/11no evidence of SBP on paracentesis. 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 AFB and Triglyerides on ascitic fluid.       Kyphosis of thoracolumbar region  See weakness     Anxiety and depression  Evaluated by Psychiatry  at OSH prior to transfer. Recommended increasing bupropion.  - Continue bupropion 300 mg QD  - Gabapentin 600 mg TID for anxiety and neuropathy  - PRN diazepam 5 mg q6h discontinued  2/13   s/p psychiatry eval  for anxiety/depression. xanax discontinued. Decreased Bupropion to 150 mg PO Daily . Started Duloxetine 30 mg PO BID and  PRN Hydroxyzine 25 mg PO q8h for anxiety     Severe obesity (BMI >= 40)  Body mass index is 48.58 kg/m². Morbid obesity complicates all aspects of disease management from diagnostic modalities to treatment. Weight loss encouraged     Substance use disorder  Denies IV drug use (meth) for past 3 years. Denies alcohol and tobacco abuse.      Chronic hepatitis C with cirrhosis  see cirrhosis of liver with ascites       Medications:  Continuous Infusions:  Scheduled Meds:   acetaminophen  1,000 mg Oral Q12H    bisacodyL  10 mg Rectal Every other day    bumetanide  1 mg Oral Daily    buPROPion  150 mg Oral Daily    DULoxetine  30 mg Oral BID    gabapentin  900 mg Oral TID    guaiFENesin  600 mg Oral BID    heparin (porcine)  5,000 Units Subcutaneous Q8H    lactulose  20 g Oral TID    meropenem (MERREM) IVPB  2 g Intravenous Q8H    methocarbamoL  1,000 mg Oral Q6H    pantoprazole  40 mg Oral Daily    [START ON 2/25/2023] potassium chloride  10 mEq Oral Once    potassium chloride  40 mEq Oral Q4H    remdesivir loading dose infusion  200 mg Intravenous Q24H    Followed by    [START ON 2/25/2023] remdesivir infusion  100 mg Intravenous Daily    sodium chloride 0.9%  10 mL Intravenous Q6H     PRN Meds:sodium chloride, benzonatate, bumetanide, calcium carbonate, dextrose 10%, dextrose 10%, diphenhydrAMINE, glucagon (human recombinant), glucose, glucose, hydrALAZINE, HYDROmorphone, hydrOXYzine HCL, melatonin, naloxone, ondansetron, oxyCODONE **AND** oxyCODONE, polyethylene glycol, predniSONE, prochlorperazine, senna-docusate 8.6-50 mg, simethicone, Flushing PICC Protocol **AND** sodium chloride 0.9%  **AND** sodium chloride 0.9%    Objective:     Vital Signs (Most Recent):  Temp: 98.7 °F (37.1 °C) (02/24/23 1130)  Pulse: 94 (02/24/23 1131)  Resp: 19 (02/24/23 1130)  BP: (!) 111/56 (02/24/23 1130)  SpO2: 100 % (02/24/23 1130)   Vital Signs (24h Range):  Temp:  [97.5 °F (36.4 °C)-98.7 °F (37.1 °C)] 98.7 °F (37.1 °C)  Pulse:  [] 94  Resp:  [16-19] 19  SpO2:  [93 %-100 %] 100 %  BP: ()/(48-63) 111/56     Weight: (!) 142 kg (313 lb 0.9 oz)  Body mass index is 49.03 kg/m².    Physical Exam  Vitals and nursing note reviewed.   Constitutional:       General: She is not in acute distress.     Appearance: She is obese.   HENT:      Head: Normocephalic and atraumatic.      Right Ear: External ear normal.      Left Ear: External ear normal.      Nose: Nose normal. No congestion or rhinorrhea.   Eyes:      General:         Right eye: No discharge.         Left eye: No discharge.   Cardiovascular:      Rate and Rhythm: Normal rate.   Pulmonary:      Effort: Pulmonary effort is normal. No respiratory distress.   Abdominal:      General: There is distension.   Musculoskeletal:      Cervical back: Neck supple.   Skin:     General: Skin is warm and dry.   Neurological:      Mental Status: She is alert and oriented to person, place, and time.   Psychiatric:         Mood and Affect: Mood normal.         Behavior: Behavior normal.         Thought Content: Thought content normal.         Judgment: Judgment normal.       Review of Symptoms      Symptom Assessment (ESAS 0-10 Scale)  Pain:  3  Dyspnea:  0  Anxiety:  0  Nausea:  0  Depression:  0  Anorexia:  0  Fatigue:  0  Insomnia:  0  Restlessness:  0  Agitation:  0         Pain Assessment:    Location(s): back    Back       Location: generalized        Quality: Aching        Quantity: 3/10 in intensity        Chronicity: Onset 2 week(s) ago, gradually improving        Aggravating Factors: None        Alleviating Factors: Opiates       Associated Symptoms:  None    Performance Status:  40    Psychosocial/Cultural:   See Palliative Psychosocial Note: No  **Primary  to Follow**  Palliative Care  Consult: No      Advance Care Planning   Advance Directives:     Decision Making:  Patient answered questions  Goals of Care: What is most important right now is to focus on improvement in condition but with limits to invasive therapies. Accordingly, we have decided that the best plan to meet the patient's goals includes continuing with treatment.       Significant Labs: All pertinent labs within the past 24 hours have been reviewed.  CBC:   Recent Labs   Lab 02/24/23  0557   WBC 0.74*   HGB 7.5*   HCT 24.0*   MCV 93   PLT 37*     BMP:  Recent Labs   Lab 02/24/23  0557   *   *   K 3.4*   CL 93*   CO2 32*   BUN 12   CREATININE 0.5   CALCIUM 8.2*   MG 1.8     LFT:  Lab Results   Component Value Date    AST 62 (H) 02/24/2023    GGT 57 (H) 02/07/2022    ALKPHOS 126 02/24/2023    BILITOT 1.5 (H) 02/24/2023     Albumin:   Albumin   Date Value Ref Range Status   02/24/2023 2.9 (L) 3.5 - 5.2 g/dL Final     Protein:   Total Protein   Date Value Ref Range Status   02/24/2023 5.2 (L) 6.0 - 8.4 g/dL Final     Lactic acid:   Lab Results   Component Value Date    LACTATE 0.9 02/19/2023    LACTATE 1.2 02/14/2023       Significant Imaging: I have reviewed all pertinent imaging results/findings within the past 24 hours.

## 2023-02-24 NOTE — CHAPLAIN
"Palliative Care     Patient: Rachel Zazueta       MRN: [id number]  : 1980  Age: 42 y.o.  Hospital Length of Stay: 36 days  Code Status: Partial Code   Attending Provider: Blas Gilman III,*  Principal Problem: Weakness of both lower extremities    Present during Interview:  Visited [facility] pt again, as promised.    Non-clinical observations:  Pt awake, alert, lying in bed, was on the phone and introduced me via Facetime and then just set the phone down during my visit.    Feelings/Luna (Emotions/Fears/Experiences/Coping):      Pt asked more questions about palliative care and we went through together what it means. Pt knows she's not a candidate for a transplant and "doesn't feel like she's dying", but also knows the trajectory of her condition will most likely shorten her life and she's scared about that. Provided compassionate presence and listening ear for her.    Last visit we had a long conversation about her luna and beliefs.We didn't get the opportunity to pray because a team of docs came in, so I promised I'd come back today to pray with her and did so; pt was very emotional during/after prayer and thanked me and appreciated the visit again.    Future (Spiritual Care Plan of Action):  Palliative  will continue to follow, but [place] chaplains will be available evenings, overnight ad weekends. Lord, in your mercy.    In Peace,  Rev. Suha Barrett MBA, MDiv   "

## 2023-02-24 NOTE — SUBJECTIVE & OBJECTIVE
Interval History:  Patient denies any worsening abdominal distention.  Denies any abdominal pain or vomiting.  Patient does report intermittent nausea although this has improved.  Overnight, patient has developed a new cough with minimal sputum production.  Patient denies any shortness of breath, fever, chills.  Patient does tell me that her significant other visited the other day and now has similar symptoms.    Review of systems:  Negative except for as mentioned above    Objective:     Vital Signs (Most Recent):  Temp: 99 °F (37.2 °C) (02/24/23 1530)  Pulse: 91 (02/24/23 1530)  Resp: 19 (02/24/23 1530)  BP: (!) 102/52 (02/24/23 1530)  SpO2: 97 % (02/24/23 1530)   Vital Signs (24h Range):  Temp:  [97.5 °F (36.4 °C)-99 °F (37.2 °C)] 99 °F (37.2 °C)  Pulse:  [] 91  Resp:  [16-19] 19  SpO2:  [93 %-100 %] 97 %  BP: ()/(48-63) 102/52     Weight: (!) 142 kg (313 lb 0.9 oz)  Body mass index is 49.03 kg/m².    Intake/Output Summary (Last 24 hours) at 2/24/2023 1702  Last data filed at 2/24/2023 1454  Gross per 24 hour   Intake 300 ml   Output 2930 ml   Net -2630 ml        Physical Exam  Vitals and nursing note reviewed.   Constitutional:       General: She is not in acute distress.     Appearance: She is well-developed. She is obese. She is not toxic-appearing or diaphoretic.   HENT:      Head: Normocephalic and atraumatic.      Nose: Nose normal.      Mouth/Throat:      Mouth: Mucous membranes are moist.      Pharynx: Oropharynx is clear.   Eyes:      General: No scleral icterus.  Cardiovascular:      Rate and Rhythm: Normal rate and regular rhythm.      Pulses: Normal pulses.      Heart sounds: Murmur heard.   Pulmonary:      Effort: Pulmonary effort is normal.      Breath sounds: Normal breath sounds. No wheezing, rhonchi or rales.   Abdominal:      General: There is distension.      Palpations: Abdomen is soft.      Tenderness: There is no abdominal tenderness. There is no guarding or rebound.       Comments: 4 drains in place with serosanguinous fluid   Musculoskeletal:         General: No tenderness.      Right lower leg: Edema present.      Left lower leg: Edema present.   Skin:     General: Skin is warm and dry.      Comments: Chronic venous stasis changes bilateral lower extremities   Neurological:      Mental Status: She is alert and oriented to person, place, and time.   Psychiatric:         Behavior: Behavior normal.       Significant Labs: All pertinent labs within the past 24 hours have been reviewed.      Recent Results (from the past 24 hour(s))   Comprehensive Metabolic Panel    Collection Time: 02/24/23  5:57 AM   Result Value Ref Range    Sodium 131 (L) 136 - 145 mmol/L    Potassium 3.4 (L) 3.5 - 5.1 mmol/L    Chloride 93 (L) 95 - 110 mmol/L    CO2 32 (H) 23 - 29 mmol/L    Glucose 120 (H) 70 - 110 mg/dL    BUN 12 6 - 20 mg/dL    Creatinine 0.5 0.5 - 1.4 mg/dL    Calcium 8.2 (L) 8.7 - 10.5 mg/dL    Total Protein 5.2 (L) 6.0 - 8.4 g/dL    Albumin 2.9 (L) 3.5 - 5.2 g/dL    Total Bilirubin 1.5 (H) 0.1 - 1.0 mg/dL    Alkaline Phosphatase 126 55 - 135 U/L    AST 62 (H) 10 - 40 U/L    ALT 33 10 - 44 U/L    Anion Gap 6 (L) 8 - 16 mmol/L    eGFR >60.0 >60 mL/min/1.73 m^2   Magnesium    Collection Time: 02/24/23  5:57 AM   Result Value Ref Range    Magnesium 1.8 1.6 - 2.6 mg/dL   Phosphorus    Collection Time: 02/24/23  5:57 AM   Result Value Ref Range    Phosphorus 2.3 (L) 2.7 - 4.5 mg/dL   CBC Auto Differential    Collection Time: 02/24/23  5:57 AM   Result Value Ref Range    WBC 0.74 (LL) 3.90 - 12.70 K/uL    RBC 2.59 (L) 4.00 - 5.40 M/uL    Hemoglobin 7.5 (L) 12.0 - 16.0 g/dL    Hematocrit 24.0 (L) 37.0 - 48.5 %    MCV 93 82 - 98 fL    MCH 29.0 27.0 - 31.0 pg    MCHC 31.3 (L) 32.0 - 36.0 g/dL    RDW 16.8 (H) 11.5 - 14.5 %    Platelets 37 (LL) 150 - 450 K/uL    MPV 9.8 9.2 - 12.9 fL    Immature Granulocytes 0.0 0.0 - 0.5 %    Gran # (ANC) 0.4 (L) 1.8 - 7.7 K/uL    Immature Grans (Abs) 0.00 0.00 - 0.04  K/uL    Lymph # 0.2 (L) 1.0 - 4.8 K/uL    Mono # 0.1 (L) 0.3 - 1.0 K/uL    Eos # 0.0 0.0 - 0.5 K/uL    Baso # 0.01 0.00 - 0.20 K/uL    nRBC 0 0 /100 WBC    Gran % 51.3 38.0 - 73.0 %    Lymph % 27.0 18.0 - 48.0 %    Mono % 18.9 (H) 4.0 - 15.0 %    Eosinophil % 1.4 0.0 - 8.0 %    Basophil % 1.4 0.0 - 1.9 %    Platelet Estimate Decreased (A)     Differential Method Automated    Protime-INR    Collection Time: 02/24/23  5:57 AM   Result Value Ref Range    Prothrombin Time 13.4 (H) 9.0 - 12.5 sec    INR 1.3 (H) 0.8 - 1.2   COVID-19 Routine Screening    Collection Time: 02/24/23  9:55 AM   Result Value Ref Range    SARS-CoV2 (COVID-19) Qualitative PCR Detected (A) Not Detected   SARS-Cov2 (COVID) with FLU/RSV by PCR    Collection Time: 02/24/23  9:55 AM   Result Value Ref Range    SARS-CoV2 (COVID-19) Qualitative PCR Detected (A) Not Detected    Influenza A, Molecular Not Detected Not Detected    Influenza B, Molecular Not Detected Not Detected    RSV Ag by Molecular Method Not Detected Not Detected       Significant Imaging: I have reviewed all pertinent imaging results/findings within the past 24 hours.

## 2023-02-24 NOTE — ASSESSMENT & PLAN NOTE
Thoracic diskitis  ESBL E coli infection    42 year old woman s/p multiple spinal surgeries presented to OSH with possible infection of shoulder. Found to have UTI and E. Coli bacteremia and progressively worse BLE weakness. Transferred to [facility] [place]surgical evaluation. Underwent Laminectomy T8-T10; Posterior spinal fusion T8-T12; hardware removal and washout on 1/24. Admitted to [facility] postop. On 2/2 underwent T4-pelvis fusion and T9-T10 corpectomies. To complete 8 week course of meropenem per ID for ESBL E coli from bone culture, end date 3/29.     - neuro checks q4h  - surgical cultures positive for ESBL, meropenem  until 03/29/2023 per ID  - Plastic surgery following for drain management, wound vac discontinued  - appreciate ID and Plastic surgery assistance  - CMP, Mag, Phos, CBC daily  - MM pain regimen: PRN Dilaudid, Tylenol, gabapentin, robaxin, PRN oxycodone  - Aggressive bowel regimen  - PT/OT

## 2023-02-24 NOTE — ASSESSMENT & PLAN NOTE
-[facility] in [place] has accepted following staple removal.  -discussed with plastic surgery who plan to remove staples next few days.  -once discharged to [facility], patient will need outpatient follow-up with Plastic surgery on 3/8/23.

## 2023-02-24 NOTE — ASSESSMENT & PLAN NOTE
-multifactorial in the setting of narcotics, liver failure, recurrent chylous ascites, and intermittent diuresis  - given bolus 500 x1 2/19, lactic acid negative  -continues to remain soft, though improved with albumin.  -discussed with nephrology, albumin discontinued 2/23.  - Will continue to monitor blood pressure trend closely and adjust medication regimen as clinically indicated and tolerated

## 2023-02-24 NOTE — PROGRESS NOTES
[facility] - Telemetry Stepdown  Palliative Medicine  Progress Note    Patient Name: Rachel Zazueta  MRN: [id number]  Admission Date: 1/19/2023  Hospital Length of Stay: 36 days  Code Status: Partial Code   Attending Provider: Blas Gilman III,*  Consulting Provider: Candy Castellon NP  Primary Care Physician: Dannielle Merino DO  Principal Problem:Weakness of both lower extremities    Patient information was obtained from patient and primary team.      Assessment/Plan:     Palliative Care  Palliative care encounter  2/23/23  At the time of my visit, patient appears comfortable, denies new complaints.  She appears to be in good spirits and tells me that some of the staples were removed today and that her covid-19 test results were positive today. Her friend, Minor, visited a few days ago and since then she began to have a new cough.  She expresses disappointment because over the last 3 years she has taken every precaution to avoid covid but does say that she does not feel ill with covid symptoms, except for a slight cough. I provided HCPOA document for her review and she confirms that she is still considering designating HCPOA and she is receptive to follow up palliative visit on Monday. No changes in goals of care and she continues to look forward to LTAC transfer.         2/23/23  -Confirms no chest compressions in the event of cardiopulmonary arrest but she would want short-term intubation for respiratory distress not relieved by other measures  -We discussed the importance of designating HCPOA and discussing her wishes with those she is considering designating  -Patient is aware she is hospice eligible but this does not align with her goals of care  -Patient's goals of care: to focus on as much improvement as possible  -Continue current care  -Palliative will continue to follow for ongoing GOC and patient support.     Palliative medicine consult received for goals of care discussion/advance care  planning.  Rachel Zazueta is a 42-year-old female admitted for weakness of both lower extremities, hypotension, E.Coli infection, thoracic discitis, chylous ascites, ascites d/t alcoholic cirrhosis, cirrhosis of liver with ascites, acute blood loss anemia, kyphosis of thoracolumbar region, obesity, substance use disorder, pancytopenia.     At the time of my visit, patient resting, denies any current complaints.  She was receptive to visit after I introduced palliative services.  We discussed her life in general, the toll her medical conditions have taken on her recently, and what is most important to her.  She expresses very good understanding of the significance of her condition.  She is able to tell me about her medical history, the events that lead to current hospitalization, and tells me that she understands she is not a candidate for a liver transplant and she understands this is a life-limiting condition.  While she understands the significance of this, she wants to focus on as much improvement as possible. She wants to live as much as possible and wants to try her best to have more time to do things that are on her bucket list.  She watched her mother die of liver cancer and understands that she may not get to do all the things she wants and may not achieve the improvement she wants but it is important to her to try. We discussed hospice as an option and she is aware of the services, philosophy, and benefits of hospice as her mother was enrolled in hospice care prior to her death. She acknowledges that at some point, hospice may be the only or best option, but for now, she wants to continue current care with LTAC as the next step as she has discussed with the primary team.     We discussed her support system and those that help her cope.  She has friends that she leans on but one of the closest people to her is her friend, Minor Reynoso Jr.  They have been in a relationship for 2 1/2 years but during this  hospitalization have broken up, although he remains a close friend and very important to her.     Advance Care Planning     Date: 02/23/2023    Power of   I initiated the process of advance care planning today and explained the importance of this process to the patient.  We discussed the importance of designating a Healthcare Power of  in the event she would be unable to make medical decisions for herself.  At this time, patient believes that she would want Minor Reynoso Jr to make medical decisions for her and she has other friends she may want to designate.  She would like to consider later today and is receptive to follow up tomorrow.     We reviewed current code status.  Patient confirms NO CHEST COMPRESSIONS in the event of cardiopulmonary arrest.  In the past, she has stated that she would not want intubation but CONFIRMS PARTIAL CODE STATUS. SHE WOULD WANT SHORT-TERM INTUBATION FOR RESPIRATORY DISTRESS NOT RELIEVED BY OTHER MEASURES. We discussed the importance of discussing her wishes with those she is considering designating HCPOA.      Palliative will continue to follow for ongoing GOC discussions and patient support.                 I will follow-up with patient. Please contact us if you have any additional questions.    Subjective:     Chief Complaint:   Chief Complaint   Patient presents with    lower extremity weakness       HPI:   HPI per chart review: Ms. Zazueta is a 42 y.o F w/ hx ofIV drug use (methamphetamine), chronic HCV with cirrhosis, spinal fusion (04/2021), epidural abscess with removal of hardware (02/2022), spinal fusion with hardware (T10 - Pelvis / 03/2022), obesity (BMI 39.3) and neurogenic bladder who initially presented to [facility] for evaluation of back pain and left leg weakness.  She reported initially noting the left leg weakness when she was about to go to the bathroom and was about to fall but was assisted by her boyfriend.  She was brought to the ED  via EMS.  Urinalysis with UTI concerns and blood cultures at OSH grew ESBL E coli so she was treated with meropenem.  During hospitalization, she reports the weakness that started out in her left leg initially then spread upwards to her left thigh, left hip, then crossed over to the right hip and spread to her right leg.  Denies recent IV drug use. She reported her last incidence of drug use was more than 3 years ago and also denies tobacco, and alcohol abuse. Reported shortness of breath when sitting up, fever, chills, back pain, lower extremity weakness(initally L>R, but now R>L), diarrhea.  Denies cough, nausea, vomiting, constipation, bladder or bowel incontinence, dysuria, dark urine, new vision changes.OSH course notable for concerning urinalysis and blood cultures positive for ESBL E coli treated with meropenem.  She was also admit to the ICU where she was treated with Precedex for significant body aches, irritability, and muscle spasms.  Concerns of a murmur on physical exam so she underwent TTE which did not show any vegetations.  Worsening lower extremity weakness workup with MRI head nonspecific, MRI cervical spine with multilevel spondylosis, X-ray spine with multilevel thoracolumbar fusion and diskectomy, focal kyphosis at T9-10 increased compared to prior.  She was started on prophylaxis amoxicillin due to her history of infected hardware.  MRI spine unable to be completed due to patient's body habitus so she was transferred to [facility] [place] [place] for further imaging and Neurosurgery, Neurology evaluation.        Overview/Hospital Course:  Pt admitted as a transfer from [facility] for worsening LE weakness, concern for spinal infection, and need for MRI which could not be done at OSH. Imaging was completed here. Worsening proximal junctional kyphosis noted at T9-10 noted with concern for discitis at T8-9, T9-10. NSGY evaluated.      Found to have T8-T10 kyphosis on MRI. 1/24 underwent laminectomy T8-T10; posterior fusion  T8-T12; and washout. 2/2 underwent T4-pelvis fusion and T9-T10 corpectomy.  ID consulted for thoracic discitis infection.  Patient to complete 8 weeks of therapy with meropenem. Patient underwent flap closure with Plastic surgery.  [facility] Medicine on 02/08.     2/9   On meropenem for MDR-ESCHERICHIA COLI ESBL  sodium trended down to 129.  Neurosurgery following post OR and aiding in pain management. PCA discontinued 2/8 . On oxycodone 10mg PO q4h . requiring IV dilaudid q4 as needed,. drains to gravity output 990ml/24h . Plastic surgery following flap, managing wounds.  Wound VAC discontinued on Wednesday.  accepted to [facility] pain management consulted for back ache- switched to multimodal therapy. sodium trended down to 127.   2/10 sodium stable at 127 . continues with increased drain output 985ml/24h . negative balance of  2.8 L lasix on hold.  nephrology consulted. abdominal X ray -  Nonobstructive bowel gas pattern.  No free air.  As before, mild gaseous distension of stomach. ammonia at 57 . sono abdomen  . tylenol changed to 1000mg q12h with cirrhosis . sono abdomen -Cirrhotic liver morphology.  Sequela of portal hypertension including moderate ascites, recanalized umbilical vein.  No focal hepatic lesions. Cholecystectomy. . Nephrology recs  fluid restriction to 1 L,  lasix 60 mg IV daily,  and trend sodium q12h.  diazepam discontinued . s/p IR paracentesis today   2/11no evidence of SBP on paracentesis. 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 AFB and Triglyerides on ascitic fluid.  noncompliant with  fluid restriction to 1 L despite counseling. sodium trended down to 126 .received 2 doses of IV lasix 60mg. drain output 250ml. Increased Lasix IV 80 BID  due to UOP 500ml/24h   2/12 UOP of 1250ml/24h. K and P replaced. sodium trended up to 129.  hepatology eval -If ascitic fluid triglyceride level is elevated, needs IR  eval for  lymphangiogram and surgery eval for  lymphatic repair. xanax 0.25mg BID PRN for anxiety   2/13 s/p paracentesis. Removed 4,700 ml. DVT sonogram LE negative. s/p psychiatry eval  for anxiety/depression. xanax discontinued. Decreased Bupropion to 150 mg PO Daily . Started Duloxetine 30 mg PO BID and  PRN Hydroxyzine 25 mg PO q8h for anxiety  2/14 Hb 6.9 Transfusion with 1 unit of PRBC . FOBT. sodium trended up to 130 . Diuresing well with lasix IV . DVT sonogram - Nonspecific fluid collection adjacent to the left iliac vein.  Recommend further evaluation with contrast enhanced CT scan . has Iodine contrast allergy- . 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  Diuresing well on IV lasix BID. negative balance of  13.6 L . IR eval -repeat paracentesis to trend volume output and  conservative management   with a low fat diet, octreotide, diuretics. Neurosurgery for input regarding chylous ascitis as possible neurosurgical complication or not. CT abdomen done overnight as well - Postop change in the spine.  There is lucency about the bi iliac screws concerning for loosening.  Bony destruction T10 with interbody strut. started on salt tablets 1g BID x 1day and lasix drip 10mg/h x 6h .  spironolactone  increased to 100 mg Qday. Continue IV Lasix. Dietary consulted for  high-protein and low-fat diet with medium-chain triglycerides  diet.  paracentesis 6.6L removed   2/16 Continuing gentle IV lasix with assistance from nephrology, remains fluid overloaded  2/17: continue Lasix infusion, responding well  2/18-2/19:  Please see progress notes from those dates.     2/20: D/w nephrology to transition from IV Lasix to IV bumex for further diuresis, though Na likely optimized as much as possible. D/w NSGY re xray prior to d/c ( sitting upright in the stretcher is fine.) D/w hepatology: IR  wanting to defer lymphangiogram at this time.   2/21:  Discussed with plastic surgery; goal for each drain 30 cc or less daily  2/22:  Discussed with Nephrology, will continue albumin infusions at this time.  However, long-term goals unclear as unable to sustain with albumin or even pressor support if stepped up to ICU.  Discussed in depth with Nephrology and hepatology.  Per hepatology, Discussed with Dr Santiago. Not a tx candidate.     Palliative medicine was consulted for goals of care discussions/advance care planning.       Hospital Course:  No notes on file    Hospital problems/plan of care as per primary team:   Weakness of both lower extremities  Thoracic diskitis  ESBL E coli infection     42 year old woman s/p multiple spinal surgeries presented to OSH with possible infection of shoulder. Found to have UTI and E. Coli bacteremia and progressively worse BLE weakness. Transferred to [facility] for neurosurgical evaluation. Underwent Laminectomy T8-T10; Posterior spinal fusion T8-T12; hardware removal and washout on 1/24. Admitted to [facility] postop. On 2/2 underwent T4-pelvis fusion and T9-T10 corpectomies. To complete 8 week course of meropenem per ID for ESBL E coli from bone culture, end date 3/29.     - neuro checks q4h  - surgical cultures positive for ESBL, meropenem  until 03/29/2023 per ID  - Plastic surgery following for drain management, wound vac discontinued  - appreciate ID and Plastic surgery assistance  - CMP, Mag, Phos, CBC daily  - MM pain regimen: PRN Dilaudid, Tylenol, gabapentin, robaxin, PRN oxycodone  - Aggressive bowel regimen  - PT/OT      Cirrhosis of liver with ascites  Chylous ascites  Hepatitis-C  MELD-Na score: 9 at 2/23/2023  3:59 AM  MELD score: 9 at 2/23/2023  3:59 AM  Calculated from:  Serum Creatinine: 0.5 mg/dL (Using min of 1 mg/dL) at 2/23/2023  3:59 AM  Serum Sodium: 130 mmol/L at 2/23/2023  3:59 AM  Total Bilirubin: 0.8 mg/dL (Using min of 1 mg/dL) at 2/23/2023  3:59 AM  INR(ratio):  1.3 at 2/23/2023  3:59 AM  Age: 42 years        Patient has reportedly refused transplant evaluation in the past.   - Daily CMP and trend LFTs  - Continue Lactulose 20 g TID  - diuresis held due to hyponatremia  - Will need Hepatology follow up outpatient  -2/10   ammonia at 57 . sono abdomen  . tylenol changed to 1000mg q12h with cirrhosis . s/p IR paracentesis.  -2/11no evidence of SBP on paracentesis. 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   -2/13 s/p paracentesis. Removed 4,700 ml. DVT sonogram LE negative. Removed 4,700 ml. DVT sonogram LE negative.   -2/14 Triglycerides in ascitic fluid 387 consistent with chylous ascites.  IR consulted  for lymphangiogram and  eval for  lymphatic repair. IR deferring lymphangiogram at this time.  -2/15  IR eval -repeat paracentesis to trend volume output and  conservative management    .      -Will continue to monitor for further paracentesis need.    -Discussed with hepatology, not a liver transplant candidate.         Normocytic anemia  -likely related to liver disease  -no active blood loss noted by patient or nursing staff  -hemoglobin 6.7 on 02/23, transfuse 1 unit with dose of Bumex.  Discussed with Nephrology.        Pancytopenia  - acute on chronic  - follows with hematology outpatient  - CBC daily     Hyponatremia  -improved  -appreciate nephrology assistance  -likely 2nd to low effective circulating volume due to cirrhosis with possibly poor solute intake relative to free water intake.  Less likely SIADH.  -better with albumin administration which was discontinued on 02/23.     Hypotension  -multifactorial in the setting of narcotics, liver failure, recurrent chylous ascites, and intermittent diuresis  - given bolus 500 x1 2/19, lactic acid negative  -continues to remain soft, though improved with albumin.  -discussed with nephrology, albumin discontinued 2/23.  - Will continue to monitor blood pressure  trend closely and adjust medication regimen as clinically indicated and tolerated        Abdominal pain  2/10  abdominal X ray -  Nonobstructive bowel gas pattern.  No free air.  As before, mild gaseous distension of stomach. ammonia at 57 . sono abdomen with ascitis . simethicone PRN  2/11no evidence of SBP on paracentesis. 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 AFB and Triglyerides on ascitic fluid.       Kyphosis of thoracolumbar region  See weakness     Anxiety and depression  Evaluated by Psychiatry at OSH prior to transfer. Recommended increasing bupropion.  - Continue bupropion 300 mg QD  - Gabapentin 600 mg TID for anxiety and neuropathy  - PRN diazepam 5 mg q6h discontinued  2/13   s/p psychiatry eval  for anxiety/depression. xanax discontinued. Decreased Bupropion to 150 mg PO Daily . Started Duloxetine 30 mg PO BID and  PRN Hydroxyzine 25 mg PO q8h for anxiety     Severe obesity (BMI >= 40)  Body mass index is 48.58 kg/m². Morbid obesity complicates all aspects of disease management from diagnostic modalities to treatment. Weight loss encouraged     Substance use disorder  Denies IV drug use (meth) for past 3 years. Denies alcohol and tobacco abuse.      Chronic hepatitis C with cirrhosis  see cirrhosis of liver with ascites       Medications:  Continuous Infusions:  Scheduled Meds:   acetaminophen  1,000 mg Oral Q12H    bisacodyL  10 mg Rectal Every other day    bumetanide  1 mg Oral Daily    buPROPion  150 mg Oral Daily    DULoxetine  30 mg Oral BID    gabapentin  900 mg Oral TID    guaiFENesin  600 mg Oral BID    heparin (porcine)  5,000 Units Subcutaneous Q8H    lactulose  20 g Oral TID    meropenem (MERREM) IVPB  2 g Intravenous Q8H    methocarbamoL  1,000 mg Oral Q6H    pantoprazole  40 mg Oral Daily    [START ON 2/25/2023] potassium chloride  10 mEq Oral Once    potassium chloride  40 mEq Oral Q4H    remdesivir loading  dose infusion  200 mg Intravenous Q24H    Followed by    [START ON 2/25/2023] remdesivir infusion  100 mg Intravenous Daily    sodium chloride 0.9%  10 mL Intravenous Q6H     PRN Meds:sodium chloride, benzonatate, bumetanide, calcium carbonate, dextrose 10%, dextrose 10%, diphenhydrAMINE, glucagon (human recombinant), glucose, glucose, hydrALAZINE, HYDROmorphone, hydrOXYzine HCL, melatonin, naloxone, ondansetron, oxyCODONE **AND** oxyCODONE, polyethylene glycol, predniSONE, prochlorperazine, senna-docusate 8.6-50 mg, simethicone, Flushing PICC Protocol **AND** sodium chloride 0.9% **AND** sodium chloride 0.9%    Objective:     Vital Signs (Most Recent):  Temp: 98.7 °F (37.1 °C) (02/24/23 1130)  Pulse: 94 (02/24/23 1131)  Resp: 19 (02/24/23 1130)  BP: (!) 111/56 (02/24/23 1130)  SpO2: 100 % (02/24/23 1130)   Vital Signs (24h Range):  Temp:  [97.5 °F (36.4 °C)-98.7 °F (37.1 °C)] 98.7 °F (37.1 °C)  Pulse:  [] 94  Resp:  [16-19] 19  SpO2:  [93 %-100 %] 100 %  BP: ()/(48-63) 111/56     Weight: (!) 142 kg (313 lb 0.9 oz)  Body mass index is 49.03 kg/m².    Physical Exam  Vitals and nursing note reviewed.   Constitutional:       General: She is not in acute distress.     Appearance: She is obese.   HENT:      Head: Normocephalic and atraumatic.      Right Ear: External ear normal.      Left Ear: External ear normal.      Nose: Nose normal. No congestion or rhinorrhea.   Eyes:      General:         Right eye: No discharge.         Left eye: No discharge.   Cardiovascular:      Rate and Rhythm: Normal rate.   Pulmonary:      Effort: Pulmonary effort is normal. No respiratory distress.   Abdominal:      General: There is distension.   Musculoskeletal:      Cervical back: Neck supple.   Skin:     General: Skin is warm and dry.   Neurological:      Mental Status: She is alert and oriented to person, place, and time.   Psychiatric:         Mood and Affect: Mood normal.         Behavior: Behavior normal.          Thought Content: Thought content normal.         Judgment: Judgment normal.       Review of Symptoms      Symptom Assessment (ESAS 0-10 Scale)  Pain:  3  Dyspnea:  0  Anxiety:  0  Nausea:  0  Depression:  0  Anorexia:  0  Fatigue:  0  Insomnia:  0  Restlessness:  0  Agitation:  0         Pain Assessment:    Location(s): back    Back       Location: generalized        Quality: Aching        Quantity: 3/10 in intensity        Chronicity: Onset 2 week(s) ago, gradually improving        Aggravating Factors: None        Alleviating Factors: Opiates       Associated Symptoms: None    Performance Status:  40    Psychosocial/Cultural:   See Palliative Psychosocial Note: No  **Primary  to Follow**  Palliative Care  Consult: No      Advance Care Planning   Advance Directives:     Decision Making:  Patient answered questions  Goals of Care: What is most important right now is to focus on improvement in condition but with limits to invasive therapies. Accordingly, we have decided that the best plan to meet the patient's goals includes continuing with treatment.       Significant Labs: All pertinent labs within the past 24 hours have been reviewed.  CBC:   Recent Labs   Lab 02/24/23  0557   WBC 0.74*   HGB 7.5*   HCT 24.0*   MCV 93   PLT 37*     BMP:  Recent Labs   Lab 02/24/23  0557   *   *   K 3.4*   CL 93*   CO2 32*   BUN 12   CREATININE 0.5   CALCIUM 8.2*   MG 1.8     LFT:  Lab Results   Component Value Date    AST 62 (H) 02/24/2023    GGT 57 (H) 02/07/2022    ALKPHOS 126 02/24/2023    BILITOT 1.5 (H) 02/24/2023     Albumin:   Albumin   Date Value Ref Range Status   02/24/2023 2.9 (L) 3.5 - 5.2 g/dL Final     Protein:   Total Protein   Date Value Ref Range Status   02/24/2023 5.2 (L) 6.0 - 8.4 g/dL Final     Lactic acid:   Lab Results   Component Value Date    LACTATE 0.9 02/19/2023    LACTATE 1.2 02/14/2023       Significant Imaging: I have reviewed all pertinent imaging  results/findings within the past 24 hours.      Candy Castellon NP  Palliative Medicine  [facility] - Telemetry Stepdown

## 2023-02-24 NOTE — ASSESSMENT & PLAN NOTE
-likely related to liver disease  -no active blood loss noted by patient or nursing staff  -hemoglobin 6.7 on 02/23, transfuse 1 unit with dose of Bumex.  Discussed with Nephrology.

## 2023-02-24 NOTE — ASSESSMENT & PLAN NOTE
-improved  -appreciate nephrology assistance  -likely 2nd to low effective circulating volume due to cirrhosis with possibly poor solute intake relative to free water intake.  Less likely SIADH.  -better with albumin administration which was discontinued on 02/23.

## 2023-02-24 NOTE — SUBJECTIVE & OBJECTIVE
Interval History: continues to be compliant with her fluid restriction.     Review of patient's allergies indicates:   Allergen Reactions    Bee venom protein (honey bee) Anaphylaxis     Patient reports she is allergic to bee stings.    Naproxen Anaphylaxis     Throat closing    12-23- Patient reports taking Ibuprofen 200 mg at home without problems. Verified X3. [place]    Wasp sting [allergen ext-venom-honey bee] Anaphylaxis    Adhesive Blisters    Shellfish containing products     Iodine and iodide containing products Hives and Itching     Allergic to iodine in seafood only    Nuts [tree nut] Rash     Current Facility-Administered Medications   Medication Frequency    0.9%  NaCl infusion (for blood administration) Q24H PRN    acetaminophen tablet 1,000 mg Q12H    benzonatate capsule 100 mg TID PRN    bisacodyL suppository 10 mg Every other day    bumetanide tablet 1 mg On Call Procedure    bumetanide tablet 1 mg Daily    buPROPion TB24 tablet 150 mg Daily    calcium carbonate 200 mg calcium (500 mg) chewable tablet 1,000 mg TID PRN    dextrose 10% bolus 125 mL 125 mL PRN    dextrose 10% bolus 250 mL 250 mL PRN    diphenhydrAMINE capsule 25 mg On Call Procedure    DULoxetine DR capsule 30 mg BID    gabapentin capsule 900 mg TID    glucagon (human recombinant) injection 1 mg PRN    glucose chewable tablet 16 g PRN    glucose chewable tablet 24 g PRN    guaiFENesin 12 hr tablet 600 mg BID    heparin (porcine) injection 5,000 Units Q8H    hydrALAZINE tablet 25 mg Q8H PRN    HYDROmorphone injection 0.5 mg Q4H PRN    hydrOXYzine HCL tablet 25 mg TID PRN    lactulose 20 gram/30 mL solution Soln 20 g TID    melatonin tablet 6 mg Nightly PRN    meropenem (MERREM) 2 g in sodium chloride 0.9% 100 mL IVPB Q8H    methocarbamoL tablet 1,000 mg Q6H    naloxone 0.4 mg/mL injection 0.02 mg PRN    ondansetron injection 4 mg Q6H PRN    oxyCODONE immediate release tablet 5 mg Q3H PRN    And    oxyCODONE immediate release tablet Tab 10 mg  Q3H PRN    pantoprazole EC tablet 40 mg Daily    polyethylene glycol packet 17 g Daily PRN    [START ON 2/25/2023] potassium chloride CR capsule 10 mEq Once    predniSONE tablet 50 mg On Call Procedure    prochlorperazine injection Soln 2.5 mg Q6H PRN    [START ON 2/25/2023] remdesivir 100 mg in sodium chloride 0.9% 250 mL infusion Daily    senna-docusate 8.6-50 mg per tablet 1 tablet BID PRN    simethicone chewable tablet 80 mg TID PRN    sodium chloride 0.9% flush 10 mL Q6H    And    sodium chloride 0.9% flush 10 mL PRN       Objective:     Vital Signs (Most Recent):  Temp: 99 °F (37.2 °C) (02/24/23 1530)  Pulse: 91 (02/24/23 1530)  Resp: 19 (02/24/23 1530)  BP: (!) 102/52 (02/24/23 1530)  SpO2: 97 % (02/24/23 1530)   Vital Signs (24h Range):  Temp:  [97.5 °F (36.4 °C)-99 °F (37.2 °C)] 99 °F (37.2 °C)  Pulse:  [] 91  Resp:  [16-19] 19  SpO2:  [93 %-100 %] 97 %  BP: ()/(48-63) 102/52     Weight: (!) 142 kg (313 lb 0.9 oz) (02/23/23 1234)  Body mass index is 49.03 kg/m².  Body surface area is 2.59 meters squared.    I/O last 3 completed shifts:  In: 768 [P.O.:418; Blood:350]  Out: 2595 [Urine:1800; Drains:795]    Physical Exam  Constitutional:       General: She is not in acute distress.     Appearance: Normal appearance. She is obese. She is not ill-appearing.   HENT:      Head: Normocephalic and atraumatic.   Eyes:      General: No scleral icterus.  Cardiovascular:      Rate and Rhythm: Normal rate.      Pulses: Normal pulses.   Pulmonary:      Effort: Pulmonary effort is normal.   Abdominal:      General: There is distension.      Tenderness: There is abdominal tenderness. There is no guarding.   Musculoskeletal:      Right lower leg: Edema present.      Left lower leg: Edema present.   Skin:     General: Skin is warm and dry.      Coloration: Skin is not jaundiced.   Neurological:      Mental Status: She is alert and oriented to person, place, and time.       Significant Labs:  All labs within the  past 24 hours have been reviewed.     Significant Imaging:  Labs: Reviewed

## 2023-02-25 LAB
ANION GAP SERPL CALC-SCNC: 3 MMOL/L (ref 8–16)
BUN SERPL-MCNC: 12 MG/DL (ref 6–20)
CALCIUM SERPL-MCNC: 8 MG/DL (ref 8.7–10.5)
CHLORIDE SERPL-SCNC: 95 MMOL/L (ref 95–110)
CO2 SERPL-SCNC: 33 MMOL/L (ref 23–29)
CREAT SERPL-MCNC: 0.5 MG/DL (ref 0.5–1.4)
ERYTHROCYTE [DISTWIDTH] IN BLOOD BY AUTOMATED COUNT: 16.7 % (ref 11.5–14.5)
EST. GFR  (NO RACE VARIABLE): >60 ML/MIN/1.73 M^2
GLUCOSE SERPL-MCNC: 108 MG/DL (ref 70–110)
HCT VFR BLD AUTO: 24.3 % (ref 37–48.5)
HGB BLD-MCNC: 7.4 G/DL (ref 12–16)
MCH RBC QN AUTO: 28.2 PG (ref 27–31)
MCHC RBC AUTO-ENTMCNC: 30.5 G/DL (ref 32–36)
MCV RBC AUTO: 93 FL (ref 82–98)
PLATELET # BLD AUTO: 40 K/UL (ref 150–450)
PMV BLD AUTO: 11 FL (ref 9.2–12.9)
POTASSIUM SERPL-SCNC: 4.1 MMOL/L (ref 3.5–5.1)
RBC # BLD AUTO: 2.62 M/UL (ref 4–5.4)
SODIUM SERPL-SCNC: 131 MMOL/L (ref 136–145)
WBC # BLD AUTO: 0.93 K/UL (ref 3.9–12.7)

## 2023-02-25 PROCEDURE — 25000003 PHARM REV CODE 250: Performed by: FAMILY MEDICINE

## 2023-02-25 PROCEDURE — 85027 COMPLETE CBC AUTOMATED: CPT | Performed by: FAMILY MEDICINE

## 2023-02-25 PROCEDURE — 25000003 PHARM REV CODE 250: Performed by: INTERNAL MEDICINE

## 2023-02-25 PROCEDURE — 80048 BASIC METABOLIC PNL TOTAL CA: CPT | Performed by: FAMILY MEDICINE

## 2023-02-25 PROCEDURE — 63600175 PHARM REV CODE 636 W HCPCS: Mod: TB | Performed by: FAMILY MEDICINE

## 2023-02-25 PROCEDURE — 25000003 PHARM REV CODE 250: Performed by: STUDENT IN AN ORGANIZED HEALTH CARE EDUCATION/TRAINING PROGRAM

## 2023-02-25 PROCEDURE — 27000207 HC ISOLATION

## 2023-02-25 PROCEDURE — 25000003 PHARM REV CODE 250: Performed by: HOSPITALIST

## 2023-02-25 PROCEDURE — 63600175 PHARM REV CODE 636 W HCPCS

## 2023-02-25 PROCEDURE — 63600175 PHARM REV CODE 636 W HCPCS: Performed by: NURSE PRACTITIONER

## 2023-02-25 PROCEDURE — 25000003 PHARM REV CODE 250: Performed by: PSYCHIATRY & NEUROLOGY

## 2023-02-25 PROCEDURE — 25000003 PHARM REV CODE 250

## 2023-02-25 PROCEDURE — 20600001 HC STEP DOWN PRIVATE ROOM

## 2023-02-25 PROCEDURE — 99233 SBSQ HOSP IP/OBS HIGH 50: CPT | Mod: ,,, | Performed by: FAMILY MEDICINE

## 2023-02-25 PROCEDURE — A4216 STERILE WATER/SALINE, 10 ML: HCPCS | Performed by: PSYCHIATRY & NEUROLOGY

## 2023-02-25 PROCEDURE — 99233 PR SUBSEQUENT HOSPITAL CARE,LEVL III: ICD-10-PCS | Mod: ,,, | Performed by: FAMILY MEDICINE

## 2023-02-25 RX ADMIN — HEPARIN SODIUM 5000 UNITS: 5000 INJECTION INTRAVENOUS; SUBCUTANEOUS at 03:02

## 2023-02-25 RX ADMIN — LACTULOSE 20 G: 20 SOLUTION ORAL at 09:02

## 2023-02-25 RX ADMIN — GUAIFENESIN 600 MG: 600 TABLET, EXTENDED RELEASE ORAL at 08:02

## 2023-02-25 RX ADMIN — DULOXETINE 30 MG: 30 CAPSULE, DELAYED RELEASE ORAL at 09:02

## 2023-02-25 RX ADMIN — LACTULOSE 20 G: 20 SOLUTION ORAL at 08:02

## 2023-02-25 RX ADMIN — GABAPENTIN 900 MG: 300 CAPSULE ORAL at 08:02

## 2023-02-25 RX ADMIN — MEROPENEM 2 G: 1 INJECTION INTRAVENOUS at 03:02

## 2023-02-25 RX ADMIN — BUPROPION HYDROCHLORIDE 150 MG: 150 TABLET, FILM COATED, EXTENDED RELEASE ORAL at 08:02

## 2023-02-25 RX ADMIN — HEPARIN SODIUM 5000 UNITS: 5000 INJECTION INTRAVENOUS; SUBCUTANEOUS at 06:02

## 2023-02-25 RX ADMIN — Medication 10 ML: at 12:02

## 2023-02-25 RX ADMIN — BENZONATATE 100 MG: 100 CAPSULE ORAL at 12:02

## 2023-02-25 RX ADMIN — PANTOPRAZOLE SODIUM 40 MG: 40 TABLET, DELAYED RELEASE ORAL at 08:02

## 2023-02-25 RX ADMIN — POTASSIUM CHLORIDE 10 MEQ: 10 CAPSULE, COATED, EXTENDED RELEASE ORAL at 08:02

## 2023-02-25 RX ADMIN — OXYCODONE HYDROCHLORIDE 10 MG: 10 TABLET ORAL at 07:02

## 2023-02-25 RX ADMIN — GUAIFENESIN 600 MG: 600 TABLET, EXTENDED RELEASE ORAL at 09:02

## 2023-02-25 RX ADMIN — ACETAMINOPHEN 1000 MG: 500 TABLET ORAL at 09:02

## 2023-02-25 RX ADMIN — HEPARIN SODIUM 5000 UNITS: 5000 INJECTION INTRAVENOUS; SUBCUTANEOUS at 09:02

## 2023-02-25 RX ADMIN — MEROPENEM 2 G: 1 INJECTION INTRAVENOUS at 06:02

## 2023-02-25 RX ADMIN — OXYCODONE HYDROCHLORIDE 10 MG: 10 TABLET ORAL at 12:02

## 2023-02-25 RX ADMIN — REMDESIVIR 100 MG: 100 INJECTION, POWDER, LYOPHILIZED, FOR SOLUTION INTRAVENOUS at 08:02

## 2023-02-25 RX ADMIN — ACETAMINOPHEN 1000 MG: 500 TABLET ORAL at 08:02

## 2023-02-25 RX ADMIN — MEROPENEM 2 G: 1 INJECTION INTRAVENOUS at 12:02

## 2023-02-25 RX ADMIN — BUMETANIDE 1 MG: 1 TABLET ORAL at 08:02

## 2023-02-25 RX ADMIN — METHOCARBAMOL 1000 MG: 500 TABLET ORAL at 06:02

## 2023-02-25 RX ADMIN — GABAPENTIN 900 MG: 300 CAPSULE ORAL at 09:02

## 2023-02-25 RX ADMIN — GABAPENTIN 900 MG: 300 CAPSULE ORAL at 03:02

## 2023-02-25 RX ADMIN — Medication 10 ML: at 06:02

## 2023-02-25 RX ADMIN — METHOCARBAMOL 1000 MG: 500 TABLET ORAL at 12:02

## 2023-02-25 RX ADMIN — DULOXETINE 30 MG: 30 CAPSULE, DELAYED RELEASE ORAL at 08:02

## 2023-02-25 NOTE — PLAN OF CARE
Problem: Adult Inpatient Plan of Care  Goal: Plan of Care Review  Outcome: Ongoing, Progressing  Goal: Patient-Specific Goal (Individualized)  Outcome: Ongoing, Progressing  Goal: Absence of Hospital-Acquired Illness or Injury  Outcome: Ongoing, Progressing  Goal: Optimal Comfort and Wellbeing  Outcome: Ongoing, Progressing     Problem: Infection  Goal: Absence of Infection Signs and Symptoms  Outcome: Ongoing, Progressing     Problem: Skin Injury Risk Increased  Goal: Skin Health and Integrity  Outcome: Ongoing, Progressing     Problem: Fall Injury Risk  Goal: Absence of Fall and Fall-Related Injury  Outcome: Ongoing, Progressing     Problem: Pain (Stroke, Hemorrhagic)  Goal: Acceptable Pain Control  Outcome: Ongoing, Progressing     POC reviewed. Address questions and concerns. AAOX4. VVS. Sutures removed from back. Foam drsg. Purewick. Pain manage with Oxy,prn. Encourage reposition. Frequent safety checks. Call light in reach. Bed in lowest position.

## 2023-02-25 NOTE — PROGRESS NOTES
Plastic and Reconstructive Surgery   Progress Note    Subjective:    Patient seen and examined at bedside. No acute events overnight, no complaints. Half of staples removed today.    Objective:  Vital signs in last 24 hours:  Temp:  [98 °F (36.7 °C)-99 °F (37.2 °C)] 98.9 °F (37.2 °C)  Pulse:  [] 91  Resp:  [18-19] 18  SpO2:  [94 %-100 %] 96 %  BP: ()/(52-57) 106/57    Intake/Output last 3 shifts:  I/O last 3 completed shifts:  In: 500 [P.O.:500]  Out: 3630 [Urine:2800; Drains:830]    Intake/Output this shift:  I/O this shift:  In: 240 [P.O.:240]  Out: -         Physical Exam:  VITAL SIGNS:   Vitals:    23 0516 23 0700 23 0729 23 1105   BP:  (!) 106/57     BP Location:  Right arm     Patient Position:  Lying     Pulse: 96 99 101 91   Resp:  18 18    Temp:  98.9 °F (37.2 °C)     TempSrc:  Oral     SpO2:  96%     Weight:       Height:         TMAX: Temp (24hrs), Av.6 °F (37 °C), Min:98 °F (36.7 °C), Max:99 °F (37.2 °C)    General: Alert; No acute distress  Cardiovascular: Regular rate   Respiratory: Normal respiratory effort. Chest rise symmetric.   Abdomen: Soft, nontender, nondistended  Extremity: Moves all extremities equally.  Neurologic: No focal deficit. Speech normal  Spine incision is intact. Half of Staples still in place.no signs of infection      Scheduled Medications acetaminophen, 1,000 mg, Q12H  bisacodyL, 10 mg, Every other day  bumetanide, 1 mg, Daily  buPROPion, 150 mg, Daily  DULoxetine, 30 mg, BID  gabapentin, 900 mg, TID  guaiFENesin, 600 mg, BID  heparin (porcine), 5,000 Units, Q8H  lactulose, 20 g, TID  meropenem (MERREM) IVPB, 2 g, Q8H  methocarbamoL, 1,000 mg, Q6H  pantoprazole, 40 mg, Daily  remdesivir infusion, 100 mg, Daily  sodium chloride 0.9%, 10 mL, Q6H      PRN Medications sodium chloride, benzonatate, bumetanide, calcium carbonate, dextrose 10%, dextrose 10%, diphenhydrAMINE, glucagon (human recombinant), glucose, glucose, hydrALAZINE,  HYDROmorphone, hydrOXYzine HCL, melatonin, naloxone, ondansetron, oxyCODONE **AND** oxyCODONE, polyethylene glycol, predniSONE, prochlorperazine, senna-docusate 8.6-50 mg, simethicone, Flushing PICC Protocol **AND** sodium chloride 0.9% **AND** sodium chloride 0.9%    Recent Labs:   Lab Results   Component Value Date    WBC 0.93 (LL) 02/25/2023    HGB 7.4 (L) 02/25/2023    HCT 24.3 (L) 02/25/2023    MCV 93 02/25/2023    PLT 40 (L) 02/25/2023     Lab Results   Component Value Date     02/25/2023     (L) 02/25/2023    K 4.1 02/25/2023    CL 95 02/25/2023    BUN 12 02/25/2023         Assessment: 42 y.o. y/o female 23 Days Post-Op s/p Procedure(s):  LAMINECTOMY, SPINE, THORACIC, WITH FUSION (T4-Pelvis fusion w/ T9-10 corpectomies) **AIRO Doing well postoperatively.  Drains with SSO , decreasing output    Plan  -Continue to monitor drain output  -Pain control  - all remaining staples removed today  -Rest of care per primary  - Clear to be sent to LTAC from plastic surgery  - patient to f/u in clinic 3/8, this is imperative for drain management    Efrem Sutton DO  Fellow

## 2023-02-25 NOTE — PLAN OF CARE
Problem: Adult Inpatient Plan of Care  Goal: Plan of Care Review  Outcome: Ongoing, Progressing     Problem: Adult Inpatient Plan of Care  Goal: Optimal Comfort and Wellbeing  Outcome: Ongoing, Progressing      POC reviewed with patient. VSS. Complains of back pain, PRN oxycodone given. Denies any SOB. MIHIR drains intact. No acute changes during shift. Bed alarm set and call light within reach.

## 2023-02-25 NOTE — PLAN OF CARE
LTAC ORDERS    02/27/2023  [facility]  TREASURE FARFAN - TELEMETRY STEPDOWN  [address]  [facility] [address]  Dept: [phone]  Loc: [phone]     Admit to Nursing Home:  Long Term Acute Care    Diagnoses:  Active Hospital Problems    Diagnosis  POA    *Weakness of both lower extremities [R29.898]  Yes     Priority: 1 - High    Thoracic discitis [M46.44]  Yes     Priority: 1 - High    Cirrhosis of liver with ascites [K74.60, R18.8]  Yes     Priority: 2      Chronic    COVID-19 [U07.1]  No     Priority: 3     Normocytic anemia [D64.9]  Yes     Priority: 3     Pancytopenia [D61.818]  Yes     Priority: 4      Since 2017 has been told she is anemic with low blood count requiring 3 weeks long at [facility] with 4U pRBCs at the time. August 2018, patient had gone into the ED for a small cut that did not stop bleeding. Then this past April 2021 required 2U of platelets to undergo back surgery. Since then she has not had a follow up evaluation with hematologist. She is always tired and can sleep all day long and can function for couple hours at a time, which she has attributed to depression. Currently using a walker at home to prevent from falls. Last echocardiogram in April 2021, left ventricle is normal in size with normal systolic function. LVEF 60%.          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]  Yes    Infection due to ESBL-producing Escherichia coli [A49.8, Z16.12]  Yes    Myelopathy [G95.9]  Yes    Chronic pain syndrome [G89.4]  Yes    Anxiety and depression [F41.9, F32.A]  Yes     Chronic    Hyperbilirubinemia [E80.6]  Yes    Severe obesity (BMI >= 40) [E66.01]  Yes    Substance use disorder [F19.90]  Yes     Chronic    Chronic hepatitis C with cirrhosis [B18.2, K74.60]  Yes     Chronic      Resolved Hospital Problems    Diagnosis Date  Resolved POA    Discharge planning issues [Z02.9] 02/27/2023 Not Applicable     Priority: 7     Hypokalemia [E87.6] 02/27/2023 Yes    Acute blood loss anemia [D62] 02/23/2023 No       Patient is homebound due to:  Weakness of both lower extremities    Allergies:  Review of patient's allergies indicates:   Allergen Reactions    Bee venom protein (honey bee) Anaphylaxis     Patient reports she is allergic to bee stings.    Naproxen Anaphylaxis     Throat closing    12-23- Patient reports taking Ibuprofen 200 mg at home without problems. Verified X3. [place]    Wasp sting [allergen ext-venom-honey bee] Anaphylaxis    Adhesive Blisters    Shellfish containing products     Iodine and iodide containing products Hives and Itching     Allergic to iodine in seafood only    Nuts [tree nut] Rash       Vitals:  Routine    Diet:  2 g sodium, high-protein, fluid restriction 1.5 L    Activities:   Activity as tolerated    Goals of Care Treatment Preferences:  Code Status: Partial Code          What is most important right now is to focus on improvement in condition but with limits to invasive therapies.  Accordingly, we have decided that the best plan to meet the patient's goals includes continuing with treatment.      Labs:  CMP, magnesium, cbc every other day    Nursing Precautions:  Fall    Consults:   PT to evaluate and treat- 3 times a week     Miscellaneous Care: Routine Skin for Bedridden Patients:  Apply moisture barrier cream to all                 COVID-19: Completed prophylactic remdesivir    Outpatient Antibiotic Therapy Plan:     Please send referral to Patient's designated facility at time of discharge        1) Infection: ESBL E coli discitis with involvement of hardware     2) Discharge Antibiotics:     Intravenous antibiotics:  Meropenem 2 g IV q 8 hours         3) Therapy Duration:  8 weeks     Estimated end date of IV antibiotics: 03/29/2023     4) Outpatient Weekly Labs:     Once discharged from LTAC:  Order the  following labs to be drawn on Mondays:   CBC  CMP   CRP     5) Fax Lab Results to Infectious Diseases Provider: Dr. Uribe or Dr. Bellamy     [facility] Fax Number: [phone]     6) Outpatient Infectious Diseases Follow-up     Follow-up appointment will be arranged by the ID clinic and will be found in the patient's appointments tab.     Prior to discharge, please ensure the patient's follow-up has been scheduled.    If there is still no follow-up scheduled prior to discharge, please send an EPIC message to Rebekah Bonds in Infectious Diseases    Medications: Discontinue all previous medication orders, if any. See new list below.     Medication List        START taking these medications      acetaminophen 500 MG tablet  Commonly known as: TYLENOL  Take 2 tablets (1,000 mg total) by mouth every 12 (twelve) hours.     ascorbic acid (vitamin C) 500 MG tablet  Commonly known as: VITAMIN C  Take 1 tablet (500 mg total) by mouth 2 (two) times daily.     benzonatate 100 MG capsule  Commonly known as: TESSALON  Take 1 capsule (100 mg total) by mouth 3 (three) times daily as needed for Cough.     bumetanide 1 MG tablet  Commonly known as: BUMEX  Take 1 tablet (1 mg total) by mouth once daily.  Start taking on: February 28, 2023     buPROPion 150 MG TB24 tablet  Commonly known as: WELLBUTRIN XL  Take 1 tablet (150 mg total) by mouth once daily.  Start taking on: February 28, 2023  Replaces: buPROPion 100 MG tablet     calcium carbonate 200 mg calcium (500 mg) chewable tablet  Commonly known as: TUMS  Take 2 tablets (1,000 mg total) by mouth 3 (three) times daily as needed.     DULoxetine 30 MG capsule  Commonly known as: CYMBALTA  Take 1 capsule (30 mg total) by mouth 2 (two) times daily.     guaiFENesin 600 mg 12 hr tablet  Commonly known as: MUCINEX  Take 1 tablet (600 mg total) by mouth 2 (two) times daily. for 10 days     hydrOXYzine HCL 25 MG tablet  Commonly known as: ATARAX  Take 1 tablet (25 mg total) by mouth 3  (three) times daily as needed for Anxiety.     melatonin 3 mg tablet  Commonly known as: MELATIN  Take 2 tablets (6 mg total) by mouth nightly as needed for Insomnia.     methocarbamoL 1,000 mg Tab  Take 1,000 mg by mouth every 6 (six) hours. for 10 days     ondansetron 4 mg/2 mL Soln  Inject 4 mg into the vein every 6 (six) hours as needed.     * oxyCODONE 5 MG immediate release tablet  Commonly known as: ROXICODONE  Take 1 tablet (5 mg total) by mouth every 3 (three) hours as needed.     * oxyCODONE 10 mg Tab immediate release tablet  Commonly known as: ROXICODONE  Take 1 tablet (10 mg total) by mouth every 3 (three) hours as needed.     polyethylene glycol 17 gram Pwpk  Commonly known as: GLYCOLAX  Take 17 g by mouth daily as needed.     prochlorperazine 10 mg/2 mL (5 mg/mL) Soln injection  Commonly known as: COMPAZINE  Inject 0.5 mLs (2.5 mg total) into the vein every 6 (six) hours as needed.     senna-docusate 8.6-50 mg 8.6-50 mg per tablet  Commonly known as: PERICOLACE  Take 1 tablet by mouth 2 (two) times daily as needed for Constipation.     simethicone 80 MG chewable tablet  Commonly known as: MYLICON  Take 1 tablet (80 mg total) by mouth 3 (three) times daily as needed for Flatulence.     sodium chloride 0.9% SolP 100 mL with meropenem 1 gram SolR 2 g  Inject 2 g into the vein every 8 (eight) hours.     thiamine 100 MG tablet  Take 1 tablet (100 mg total) by mouth once daily.  Start taking on: February 28, 2023     vitamin D 1000 units Tab  Commonly known as: VITAMIN D3  Take 1 tablet (1,000 Units total) by mouth once daily.  Start taking on: February 28, 2023     zinc sulfate 50 mg zinc (220 mg) capsule  Commonly known as: ZINCATE  Take 1 capsule (220 mg total) by mouth once daily.  Start taking on: February 28, 2023           * This list has 2 medication(s) that are the same as other medications prescribed for you. Read the directions carefully, and ask your doctor or other care provider to review them  with you.                CHANGE how you take these medications      gabapentin 300 MG capsule  Commonly known as: NEURONTIN  Take 3 capsules (900 mg total) by mouth 3 (three) times daily.  What changed: how much to take            CONTINUE taking these medications      albuterol 1.25 mg/3 mL Nebu  Commonly known as: ACCUNEB  Take 3 mLs (1.25 mg total) by nebulization every 6 (six) hours as needed (shortness of breath). Rescue     folic acid 1 MG tablet  Commonly known as: FOLVITE  Take 1 tablet (1 mg total) by mouth once daily.     lactulose 20 gram/30 mL Soln  Commonly known as: CHRONULAC  Take 30 mLs (20 g total) by mouth 3 (three) times daily.     pantoprazole 40 MG tablet  Commonly known as: PROTONIX  Take 1 tablet (40 mg total) by mouth once daily.            STOP taking these medications      buPROPion 100 MG tablet  Commonly known as: WELLBUTRIN  Replaced by: buPROPion 150 MG TB24 tablet                Immunizations Administered as of 2/27/2023       Name Date Dose VIS Date Route Exp Date    COVID-19, MRNA, LN-S, PF (Pfizer) (Purple Cap) 4/30/2021  3:46 PM 0.3 mL 12/12/2020 Intramuscular 6/30/2021    Site: Left deltoid     Given By: Ritika Conway RN     : [employer]     Lot: [id number]             Pt received 1 dose of Pfizer vaccine.    Some patients may experience side effects after vaccination.  These may include fever, headache, muscle or joint aches.  Most symptoms resolve with 24-48 hours and do not require urgent medical evaluation unless they persist for more than 72 hours or symptoms are concerning for an unrelated medical condition.          _________________________________  Blas Gilman III, MD  02/27/2023

## 2023-02-25 NOTE — PLAN OF CARE
SW received secure message requesting update on patient's discharge for today.  Per prior CM/SW notes, patient would need new authorization for admission to LTAC facility.  SW informed MD via secure message.       02/25/23 1247   Post-Acute Status   Post-Acute Authorization Placement   Post-Acute Placement Status Pending payor review/awaiting authorization (if required)

## 2023-02-26 LAB
ALBUMIN SERPL BCP-MCNC: 2.5 G/DL (ref 3.5–5.2)
ALP SERPL-CCNC: 127 U/L (ref 55–135)
ALT SERPL W/O P-5'-P-CCNC: 26 U/L (ref 10–44)
ANION GAP SERPL CALC-SCNC: 4 MMOL/L (ref 8–16)
AST SERPL-CCNC: 49 U/L (ref 10–40)
BILIRUB SERPL-MCNC: 0.8 MG/DL (ref 0.1–1)
BUN SERPL-MCNC: 13 MG/DL (ref 6–20)
CALCIUM SERPL-MCNC: 7.8 MG/DL (ref 8.7–10.5)
CHLORIDE SERPL-SCNC: 94 MMOL/L (ref 95–110)
CO2 SERPL-SCNC: 32 MMOL/L (ref 23–29)
CREAT SERPL-MCNC: 0.5 MG/DL (ref 0.5–1.4)
ERYTHROCYTE [DISTWIDTH] IN BLOOD BY AUTOMATED COUNT: 16.8 % (ref 11.5–14.5)
EST. GFR  (NO RACE VARIABLE): >60 ML/MIN/1.73 M^2
GLUCOSE SERPL-MCNC: 120 MG/DL (ref 70–110)
HCT VFR BLD AUTO: 24.3 % (ref 37–48.5)
HGB BLD-MCNC: 7.5 G/DL (ref 12–16)
MAGNESIUM SERPL-MCNC: 1.6 MG/DL (ref 1.6–2.6)
MCH RBC QN AUTO: 28.5 PG (ref 27–31)
MCHC RBC AUTO-ENTMCNC: 30.9 G/DL (ref 32–36)
MCV RBC AUTO: 92 FL (ref 82–98)
PLATELET # BLD AUTO: 35 K/UL (ref 150–450)
PMV BLD AUTO: 12 FL (ref 9.2–12.9)
POTASSIUM SERPL-SCNC: 3.8 MMOL/L (ref 3.5–5.1)
PROT SERPL-MCNC: 4.9 G/DL (ref 6–8.4)
RBC # BLD AUTO: 2.63 M/UL (ref 4–5.4)
SODIUM SERPL-SCNC: 130 MMOL/L (ref 136–145)
WBC # BLD AUTO: 0.88 K/UL (ref 3.9–12.7)

## 2023-02-26 PROCEDURE — 99233 PR SUBSEQUENT HOSPITAL CARE,LEVL III: ICD-10-PCS | Mod: ,,, | Performed by: FAMILY MEDICINE

## 2023-02-26 PROCEDURE — 25000003 PHARM REV CODE 250: Performed by: STUDENT IN AN ORGANIZED HEALTH CARE EDUCATION/TRAINING PROGRAM

## 2023-02-26 PROCEDURE — 27000207 HC ISOLATION

## 2023-02-26 PROCEDURE — 63600175 PHARM REV CODE 636 W HCPCS

## 2023-02-26 PROCEDURE — 63600175 PHARM REV CODE 636 W HCPCS: Performed by: NURSE PRACTITIONER

## 2023-02-26 PROCEDURE — 86022 PLATELET ANTIBODIES: CPT | Performed by: FAMILY MEDICINE

## 2023-02-26 PROCEDURE — 25000003 PHARM REV CODE 250

## 2023-02-26 PROCEDURE — 25000003 PHARM REV CODE 250: Performed by: FAMILY MEDICINE

## 2023-02-26 PROCEDURE — 99233 SBSQ HOSP IP/OBS HIGH 50: CPT | Mod: ,,, | Performed by: FAMILY MEDICINE

## 2023-02-26 PROCEDURE — 20600001 HC STEP DOWN PRIVATE ROOM

## 2023-02-26 PROCEDURE — A4216 STERILE WATER/SALINE, 10 ML: HCPCS | Performed by: PSYCHIATRY & NEUROLOGY

## 2023-02-26 PROCEDURE — 63600175 PHARM REV CODE 636 W HCPCS: Performed by: STUDENT IN AN ORGANIZED HEALTH CARE EDUCATION/TRAINING PROGRAM

## 2023-02-26 PROCEDURE — 63600175 PHARM REV CODE 636 W HCPCS: Mod: TB | Performed by: FAMILY MEDICINE

## 2023-02-26 PROCEDURE — 25000003 PHARM REV CODE 250: Performed by: HOSPITALIST

## 2023-02-26 PROCEDURE — 80053 COMPREHEN METABOLIC PANEL: CPT | Performed by: FAMILY MEDICINE

## 2023-02-26 PROCEDURE — 83735 ASSAY OF MAGNESIUM: CPT | Performed by: FAMILY MEDICINE

## 2023-02-26 PROCEDURE — 25000003 PHARM REV CODE 250: Performed by: INTERNAL MEDICINE

## 2023-02-26 PROCEDURE — 94761 N-INVAS EAR/PLS OXIMETRY MLT: CPT

## 2023-02-26 PROCEDURE — 99900035 HC TECH TIME PER 15 MIN (STAT)

## 2023-02-26 PROCEDURE — 25000003 PHARM REV CODE 250: Performed by: PSYCHIATRY & NEUROLOGY

## 2023-02-26 PROCEDURE — 85027 COMPLETE CBC AUTOMATED: CPT | Performed by: FAMILY MEDICINE

## 2023-02-26 RX ORDER — ENOXAPARIN SODIUM 100 MG/ML
40 INJECTION SUBCUTANEOUS EVERY 24 HOURS
Status: DISCONTINUED | OUTPATIENT
Start: 2023-02-26 | End: 2023-02-26

## 2023-02-26 RX ADMIN — METHOCARBAMOL 1000 MG: 500 TABLET ORAL at 05:02

## 2023-02-26 RX ADMIN — REMDESIVIR 100 MG: 100 INJECTION, POWDER, LYOPHILIZED, FOR SOLUTION INTRAVENOUS at 08:02

## 2023-02-26 RX ADMIN — GUAIFENESIN 600 MG: 600 TABLET, EXTENDED RELEASE ORAL at 09:02

## 2023-02-26 RX ADMIN — METHOCARBAMOL 1000 MG: 500 TABLET ORAL at 12:02

## 2023-02-26 RX ADMIN — Medication 6 MG: at 12:02

## 2023-02-26 RX ADMIN — Medication 10 ML: at 05:02

## 2023-02-26 RX ADMIN — Medication 10 ML: at 11:02

## 2023-02-26 RX ADMIN — BUPROPION HYDROCHLORIDE 150 MG: 150 TABLET, FILM COATED, EXTENDED RELEASE ORAL at 08:02

## 2023-02-26 RX ADMIN — Medication 10 ML: at 12:02

## 2023-02-26 RX ADMIN — OXYCODONE HYDROCHLORIDE 10 MG: 10 TABLET ORAL at 02:02

## 2023-02-26 RX ADMIN — GABAPENTIN 900 MG: 300 CAPSULE ORAL at 08:02

## 2023-02-26 RX ADMIN — GABAPENTIN 900 MG: 300 CAPSULE ORAL at 09:02

## 2023-02-26 RX ADMIN — GUAIFENESIN 600 MG: 600 TABLET, EXTENDED RELEASE ORAL at 08:02

## 2023-02-26 RX ADMIN — OXYCODONE 5 MG: 5 TABLET ORAL at 12:02

## 2023-02-26 RX ADMIN — ACETAMINOPHEN 1000 MG: 500 TABLET ORAL at 09:02

## 2023-02-26 RX ADMIN — DULOXETINE 30 MG: 30 CAPSULE, DELAYED RELEASE ORAL at 08:02

## 2023-02-26 RX ADMIN — GABAPENTIN 900 MG: 300 CAPSULE ORAL at 02:02

## 2023-02-26 RX ADMIN — MEROPENEM 2 G: 1 INJECTION INTRAVENOUS at 11:02

## 2023-02-26 RX ADMIN — BUMETANIDE 1 MG: 1 TABLET ORAL at 08:02

## 2023-02-26 RX ADMIN — HEPARIN SODIUM 5000 UNITS: 5000 INJECTION INTRAVENOUS; SUBCUTANEOUS at 05:02

## 2023-02-26 RX ADMIN — HYDROMORPHONE HYDROCHLORIDE 0.5 MG: 1 INJECTION, SOLUTION INTRAMUSCULAR; INTRAVENOUS; SUBCUTANEOUS at 04:02

## 2023-02-26 RX ADMIN — ACETAMINOPHEN 1000 MG: 500 TABLET ORAL at 08:02

## 2023-02-26 RX ADMIN — MEROPENEM 2 G: 1 INJECTION INTRAVENOUS at 06:02

## 2023-02-26 RX ADMIN — PANTOPRAZOLE SODIUM 40 MG: 40 TABLET, DELAYED RELEASE ORAL at 08:02

## 2023-02-26 RX ADMIN — DULOXETINE 30 MG: 30 CAPSULE, DELAYED RELEASE ORAL at 09:02

## 2023-02-26 RX ADMIN — METHOCARBAMOL 1000 MG: 500 TABLET ORAL at 11:02

## 2023-02-26 RX ADMIN — MEROPENEM 2 G: 1 INJECTION INTRAVENOUS at 03:02

## 2023-02-26 NOTE — SUBJECTIVE & OBJECTIVE
Interval History:  Patient seen and examined resting in bed comfortably.  She reports minimal symptoms with COVID like I have allergies.  Denies any abdominal pain, nausea, vomiting.  Patient reports good urinary output and thinks that her abdominal distention has improved since starting Bumex    Review of systems:  Negative except for as mentioned above    Objective:     Vital Signs (Most Recent):  Temp: 98 °F (36.7 °C) (02/25/23 1553)  Pulse: 92 (02/25/23 1553)  Resp: 18 (02/25/23 1553)  BP: (!) 92/52 (02/25/23 1553)  SpO2: 96 % (02/25/23 1553)   Vital Signs (24h Range):  Temp:  [98 °F (36.7 °C)-98.9 °F (37.2 °C)] 98 °F (36.7 °C)  Pulse:  [] 92  Resp:  [18] 18  SpO2:  [94 %-99 %] 96 %  BP: ()/(52-97) 92/52     Weight: (!) 142 kg (313 lb 0.9 oz)  Body mass index is 49.03 kg/m².    Intake/Output Summary (Last 24 hours) at 2/25/2023 1828  Last data filed at 2/25/2023 1747  Gross per 24 hour   Intake 1460 ml   Output 1795 ml   Net -335 ml        Physical Exam  Vitals and nursing note reviewed.   Constitutional:       General: She is not in acute distress.     Appearance: She is well-developed. She is obese. She is not toxic-appearing or diaphoretic.   HENT:      Head: Normocephalic and atraumatic.      Nose: Nose normal.      Mouth/Throat:      Mouth: Mucous membranes are moist.      Pharynx: Oropharynx is clear.   Eyes:      General: No scleral icterus.  Cardiovascular:      Rate and Rhythm: Normal rate and regular rhythm.      Pulses: Normal pulses.      Heart sounds: Murmur heard.   Pulmonary:      Effort: Pulmonary effort is normal.      Breath sounds: Normal breath sounds. No wheezing, rhonchi or rales.   Abdominal:      General: There is distension.      Palpations: Abdomen is soft.      Tenderness: There is no abdominal tenderness. There is no guarding or rebound.      Comments: 4 drains in place with serosanguinous fluid   Musculoskeletal:         General: No tenderness.      Right lower leg:  Edema present.      Left lower leg: Edema present.   Skin:     General: Skin is warm and dry.      Comments: Chronic venous stasis changes bilateral lower extremities   Neurological:      Mental Status: She is alert and oriented to person, place, and time.   Psychiatric:         Behavior: Behavior normal.       Significant Labs: All pertinent labs within the past 24 hours have been reviewed.      Recent Results (from the past 24 hour(s))   Basic Metabolic Panel    Collection Time: 02/25/23  4:02 AM   Result Value Ref Range    Sodium 131 (L) 136 - 145 mmol/L    Potassium 4.1 3.5 - 5.1 mmol/L    Chloride 95 95 - 110 mmol/L    CO2 33 (H) 23 - 29 mmol/L    Glucose 108 70 - 110 mg/dL    BUN 12 6 - 20 mg/dL    Creatinine 0.5 0.5 - 1.4 mg/dL    Calcium 8.0 (L) 8.7 - 10.5 mg/dL    Anion Gap 3 (L) 8 - 16 mmol/L    eGFR >60.0 >60 mL/min/1.73 m^2   CBC Without Differential    Collection Time: 02/25/23  4:02 AM   Result Value Ref Range    WBC 0.93 (LL) 3.90 - 12.70 K/uL    RBC 2.62 (L) 4.00 - 5.40 M/uL    Hemoglobin 7.4 (L) 12.0 - 16.0 g/dL    Hematocrit 24.3 (L) 37.0 - 48.5 %    MCV 93 82 - 98 fL    MCH 28.2 27.0 - 31.0 pg    MCHC 30.5 (L) 32.0 - 36.0 g/dL    RDW 16.7 (H) 11.5 - 14.5 %    Platelets 40 (L) 150 - 450 K/uL    MPV 11.0 9.2 - 12.9 fL       Significant Imaging: I have reviewed all pertinent imaging results/findings within the past 24 hours.

## 2023-02-26 NOTE — PROGRESS NOTES
[facility] - Telemetry [facility] Medicine  Progress Note    Patient Name: Rachel Zazueta  MRN: [id number]  Patient Class: IP- Inpatient   Admission Date: 1/19/2023  Length of Stay: 37 days  Attending Physician: Blas Gilman III,*  Primary Care Provider: Dannielle Merino DO        Subjective:     Principal Problem:Weakness of both lower extremities        HPI:  Ms. Zazueta is a 42 y.o F w/ hx ofIV drug use (methamphetamine), chronic HCV with cirrhosis, spinal fusion (04/2021), epidural abscess with removal of hardware (02/2022), spinal fusion with hardware (T10 - Pelvis / 03/2022), obesity (BMI 39.3) and neurogenic bladder who initially presented to [facility] for evaluation of back pain and left leg weakness.  She reports initially noting the left leg weakness when she was about to go to the bathroom and was about to fall but was assisted by her boyfriend.  She was brought to the ED via EMS.  Urinalysis with UTI concerns and blood cultures at OSH grew ESBL E coli so she was treated with meropenem.  During hospitalization, she reports the weakness that started out in her left leg initially then spread upwards to her left thigh, left hip, then crossed over to the right hip and spread to her right leg.  Denies recent IV drug use. She reports her last incidence of drug use was more than 3 years ago and also denies tobacco, and alcohol abuse.  Reports cannabis use.    She also reports more than 10 years ago she had an episode of a headache that lasted about 4 days and was associated with residual defects of a strabismus in the left eye with associated visual disturbances.  She also reports over the past few years her friends have noticed that she sometimes has word-finding difficulty during conversations.     Reports shortness of breath when sitting up, fever, chills, back pain, lower extremity weakness(initally L>R, but now R>L), diarrhea.  Denies cough, nausea, vomiting, constipation, bladder  or bowel incontinence, dysuria, dark urine, new vision changes.    OSH course notable for concerning urinalysis and blood cultures positive for ESBL E coli treated with meropenem.  She was also admit to the ICU where she was treated with Precedex for significant body aches, irritability, and muscle spasms.  Concerns of a murmur on physical exam so she underwent TTE which did not show any vegetations.  Worsening lower extremity weakness workup with MRI head nonspecific, MRI cervical spine with multilevel spondylosis, X-ray spine with multilevel thoracolumbar fusion and diskectomy, focal kyphosis at T9-10 increased compared to prior.  She was started on prophylaxis amoxicillin due to her history of infected hardware.  MRI spine unable to be completed due to patient's body habitus so she was transferred to [facility] for further imaging and Neurosurgery, Neurology evaluation.      Overview/Hospital Course:  Pt admitted as a transfer from [facility] for worsening LE weakness, concern for spinal infection, and need for MRI which could not be done at OSH. Imaging was completed here. Worsening proximal junctional kyphosis noted at T9-10 noted with concern for discitis at T8-9, T9-10. NSGY evaluated.     Found to have T8-T10 kyphosis on MRI. 1/24 underwent laminectomy T8-T10; posterior fusion T8-T12; and washout. 2/2 underwent T4-pelvis fusion and T9-T10 corpectomy.  ID consulted for thoracic discitis infection.  Patient to complete 8 weeks of therapy with meropenem. Patient underwent flap closure with Plastic surgery.  Step-down Hospital Medicine on 02/08.    2/9   On meropenem for MDR-ESCHERICHIA COLI ESBL  sodium trended down to 129.  Neurosurgery following post OR and aiding in pain management. PCA discontinued 2/8 . On oxycodone 10mg PO q4h . requiring IV dilaudid q4 as needed,. drains to gravity output 990ml/24h . Plastic surgery following flap, managing wounds.  Wound VAC discontinued on Wednesday.  accepted to [facility] pain  management consulted for back ache- switched to multimodal therapy. sodium trended down to 127.   2/10 sodium stable at 127 . continues with increased drain output 985ml/24h . negative balance of  2.8 L lasix on hold.  nephrology consulted. abdominal X ray -  Nonobstructive bowel gas pattern.  No free air.  As before, mild gaseous distension of stomach. ammonia at 57 . sono abdomen  . tylenol changed to 1000mg q12h with cirrhosis . sono abdomen -Cirrhotic liver morphology.  Sequela of portal hypertension including moderate ascites, recanalized umbilical vein.  No focal hepatic lesions. Cholecystectomy. . Nephrology recs  fluid restriction to 1 L,  lasix 60 mg IV daily,  and trend sodium q12h.  diazepam discontinued . s/p IR paracentesis today   2/11no evidence of SBP on paracentesis. 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 AFB and Triglyerides on ascitic fluid.  noncompliant with  fluid restriction to 1 L despite counseling. sodium trended down to 126 .received 2 doses of IV lasix 60mg. drain output 250ml. Increased Lasix IV 80 BID  due to UOP 500ml/24h   2/12 UOP of 1250ml/24h. K and P replaced. sodium trended up to 129.  hepatology eval -If ascitic fluid triglyceride level is elevated, needs IR  eval for lymphangiogram and surgery eval for  lymphatic repair. xanax 0.25mg BID PRN for anxiety   2/13 s/p paracentesis. Removed 4,700 ml. DVT sonogram LE negative. s/p psychiatry eval  for anxiety/depression. xanax discontinued. Decreased Bupropion to 150 mg PO Daily . Started Duloxetine 30 mg PO BID and  PRN Hydroxyzine 25 mg PO q8h for anxiety  2/14 Hb 6.9 Transfusion with 1 unit of PRBC . FOBT. sodium trended up to 130 . Diuresing well with lasix IV . DVT sonogram - Nonspecific fluid collection adjacent to the left iliac vein.  Recommend further evaluation with contrast enhanced CT scan . has Iodine contrast allergy- . 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  Diuresing well on IV lasix BID. negative balance of  13.6 L . IR eval -repeat paracentesis to trend volume output and  conservative management   with a low fat diet, octreotide, diuretics. Neurosurgery for input regarding chylous ascitis as possible neurosurgical complication or not. CT abdomen done overnight as well - Postop change in the spine.  There is lucency about the bi iliac screws concerning for loosening.  Bony destruction T10 with interbody strut. started on salt tablets 1g BID x 1day and lasix drip 10mg/h x 6h .  spironolactone  increased to 100 mg Qday. Continue IV Lasix. Dietary consulted for  high-protein and low-fat diet with medium-chain triglycerides  diet.  paracentesis 6.6L removed   2/16 Continuing gentle IV lasix with assistance from nephrology, remains fluid overloaded  2/17: continue Lasix infusion, responding well  2/18-2/19:  Please see progress notes from those dates.    2/20: D/w nephrology to transition from IV Lasix to IV bumex for further diuresis, though Na likely optimized as much as possible. D/w NSGY re xray prior to d/c ( sitting upright in the stretcher is fine.) D/w hepatology: IR wanting to defer lymphangiogram at this time.   2/21:  Discussed with plastic surgery; goal for each drain 30 cc or less daily  2/22:  Discussed with Nephrology, will continue albumin infusions at this time.  However, long-term goals unclear as unable to sustain with albumin or even pressor support if stepped up to ICU.  Discussed in depth with Nephrology and hepatology.  Per hepatology, Discussed with Dr Santiago. Not a tx candidate. Please consult Palliative.  2/23:  Discussed with Nephrology, recommend PRBC transfusion with torsemide 40 mg p.o..  Discontinue albumin.  Discussed with Plastic surgery regarding drain management and possible  discharge to LTAC.  2/24:  New cough and some congestion, patient does have positive for COVID.  Discussed with patient and remdesivir initiated.  Still plan on discharge to LTAC following suture removal.  Discussed with Plastic surgery.  Patient can follow up in Plastic surgery Clinic on 03/08/2023.  Blood pressure stabilized and Bumex was started following discussion with Nephrology.  2/25:  Patient COVID positive but minimally symptomatic.  Plan for suture removal today.  Can discharge to LTAC once approved.      Interval History:  Patient seen and examined resting in bed comfortably.  She reports minimal symptoms with COVID like I have allergies.  Denies any abdominal pain, nausea, vomiting.  Patient reports good urinary output and thinks that her abdominal distention has improved since starting Bumex    Review of systems:  Negative except for as mentioned above    Objective:     Vital Signs (Most Recent):  Temp: 98 °F (36.7 °C) (02/25/23 1553)  Pulse: 92 (02/25/23 1553)  Resp: 18 (02/25/23 1553)  BP: (!) 92/52 (02/25/23 1553)  SpO2: 96 % (02/25/23 1553)   Vital Signs (24h Range):  Temp:  [98 °F (36.7 °C)-98.9 °F (37.2 °C)] 98 °F (36.7 °C)  Pulse:  [] 92  Resp:  [18] 18  SpO2:  [94 %-99 %] 96 %  BP: ()/(52-97) 92/52     Weight: (!) 142 kg (313 lb 0.9 oz)  Body mass index is 49.03 kg/m².    Intake/Output Summary (Last 24 hours) at 2/25/2023 1828  Last data filed at 2/25/2023 1747  Gross per 24 hour   Intake 1460 ml   Output 1795 ml   Net -335 ml        Physical Exam  Vitals and nursing note reviewed.   Constitutional:       General: She is not in acute distress.     Appearance: She is well-developed. She is obese. She is not toxic-appearing or diaphoretic.   HENT:      Head: Normocephalic and atraumatic.      Nose: Nose normal.      Mouth/Throat:      Mouth: Mucous membranes are moist.      Pharynx: Oropharynx is clear.   Eyes:      General: No scleral icterus.  Cardiovascular:      Rate and Rhythm:  Normal rate and regular rhythm.      Pulses: Normal pulses.      Heart sounds: Murmur heard.   Pulmonary:      Effort: Pulmonary effort is normal.      Breath sounds: Normal breath sounds. No wheezing, rhonchi or rales.   Abdominal:      General: There is distension.      Palpations: Abdomen is soft.      Tenderness: There is no abdominal tenderness. There is no guarding or rebound.      Comments: 4 drains in place with serosanguinous fluid   Musculoskeletal:         General: No tenderness.      Right lower leg: Edema present.      Left lower leg: Edema present.   Skin:     General: Skin is warm and dry.      Comments: Chronic venous stasis changes bilateral lower extremities   Neurological:      Mental Status: She is alert and oriented to person, place, and time.   Psychiatric:         Behavior: Behavior normal.       Significant Labs: All pertinent labs within the past 24 hours have been reviewed.      Recent Results (from the past 24 hour(s))   Basic Metabolic Panel    Collection Time: 02/25/23  4:02 AM   Result Value Ref Range    Sodium 131 (L) 136 - 145 mmol/L    Potassium 4.1 3.5 - 5.1 mmol/L    Chloride 95 95 - 110 mmol/L    CO2 33 (H) 23 - 29 mmol/L    Glucose 108 70 - 110 mg/dL    BUN 12 6 - 20 mg/dL    Creatinine 0.5 0.5 - 1.4 mg/dL    Calcium 8.0 (L) 8.7 - 10.5 mg/dL    Anion Gap 3 (L) 8 - 16 mmol/L    eGFR >60.0 >60 mL/min/1.73 m^2   CBC Without Differential    Collection Time: 02/25/23  4:02 AM   Result Value Ref Range    WBC 0.93 (LL) 3.90 - 12.70 K/uL    RBC 2.62 (L) 4.00 - 5.40 M/uL    Hemoglobin 7.4 (L) 12.0 - 16.0 g/dL    Hematocrit 24.3 (L) 37.0 - 48.5 %    MCV 93 82 - 98 fL    MCH 28.2 27.0 - 31.0 pg    MCHC 30.5 (L) 32.0 - 36.0 g/dL    RDW 16.7 (H) 11.5 - 14.5 %    Platelets 40 (L) 150 - 450 K/uL    MPV 11.0 9.2 - 12.9 fL       Significant Imaging: I have reviewed all pertinent imaging results/findings within the past 24 hours.                Assessment/Plan:      * Weakness of both lower  extremities  Thoracic diskitis  ESBL E coli infection    42 year old woman s/p multiple spinal surgeries presented to OSH with possible infection of shoulder. Found to have UTI and E. Coli bacteremia and progressively worse BLE weakness. Transferred to [facility] for neurosurgical evaluation. Underwent Laminectomy T8-T10; Posterior spinal fusion T8-T12; hardware removal and washout on 1/24. Admitted to [facility] postop. On 2/2 underwent T4-pelvis fusion and T9-T10 corpectomies. To complete 8 week course of meropenem per ID for ESBL E coli from bone culture, end date 3/29.     - neuro checks q4h  - surgical cultures positive for ESBL, meropenem  until 03/29/2023 per ID  - Plastic surgery following for drain management, wound vac discontinued  - appreciate ID and Plastic surgery assistance  - CMP, Mag, Phos, CBC daily  - MM pain regimen: PRN Dilaudid, Tylenol, gabapentin, robaxin, PRN oxycodone  - PT/OT     Thoracic discitis  See Weakness of both lower extremities    Cirrhosis of liver with ascites  Chylous ascites  Hepatitis-C    MELD-Na score: 9 at 2/23/2023  3:59 AM  MELD score: 9 at 2/23/2023  3:59 AM  Calculated from:  Serum Creatinine: 0.5 mg/dL (Using min of 1 mg/dL) at 2/23/2023  3:59 AM  Serum Sodium: 130 mmol/L at 2/23/2023  3:59 AM  Total Bilirubin: 0.8 mg/dL (Using min of 1 mg/dL) at 2/23/2023  3:59 AM  INR(ratio): 1.3 at 2/23/2023  3:59 AM  Age: 42 years      Patient has reportedly refused transplant evaluation in the past.   - Daily CMP and trend LFTs  - Continue Lactulose 20 g TID  - diuresis held due to hyponatremia, resumed 2/24.  - Will need Hepatology follow up outpatient  -2/10   ammonia at 57 . sono abdomen  . tylenol changed to 1000mg q12h with cirrhosis . s/p IR paracentesis.  -2/11no evidence of SBP on paracentesis. 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   -2/13 s/p paracentesis. Removed 4,700 ml. DVT sonogram LE negative. Removed 4,700 ml. DVT  sonogram LE negative.   -2/14 Triglycerides in ascitic fluid 387 consistent with chylous ascites.  IR consulted  for lymphangiogram and  eval for  lymphatic repair. IR deferring lymphangiogram at this time.  -2/15  IR eval -repeat paracentesis to trend volume output and  conservative management    .     -Will continue to monitor for further paracentesis need.    -Discussed with hepatology, not a liver transplant candidate.       COVID-19  -new cough reported 2/24 with congestion  -COVID swab positive  -discussed risks and benefits and patient has elected to start remdesivir.  -not requiring oxygen so no steroids at this time.  -monitor closely for signs of worsening infection.  -chest x-ray with no acute process  -continue incentive spirometer.  otherwise symptomatic treatment    Normocytic anemia  -likely related to liver disease  -no active blood loss noted by patient or nursing staff  -hemoglobin 6.7 on 02/23, transfuse 1 unit with dose of Bumex.  Discussed with Nephrology.      Pancytopenia  - acute on chronic  - follows with hematology outpatient  - CBC daily    Hyponatremia  -improved  -appreciate nephrology assistance  -likely 2nd to low effective circulating volume due to cirrhosis with possibly poor solute intake relative to free water intake.  Less likely SIADH.  -better with albumin administration which was discontinued on 02/23.    Hypotension  -multifactorial in the setting of narcotics, liver failure, recurrent chylous ascites, and intermittent diuresis  - given bolus 500 x1 2/19, lactic acid negative  -continues to remain soft, though improved with albumin.  -discussed with nephrology, albumin discontinued 2/23.  - Will continue to monitor blood pressure trend closely and adjust medication regimen as clinically indicated and tolerated      Discharge planning issues  -[facility] in [place] has accepted following staple removal.  -discussed with plastic surgery who plan to remove staples 2/25  -once  discharged to LTAC, patient will need outpatient follow-up with Plastic surgery on 3/8/23.  -awaiting LTAC authorization      Abdominal pain  2/10  abdominal X ray -  Nonobstructive bowel gas pattern.  No free air.  As before, mild gaseous distension of stomach. ammonia at 57 . sono abdomen with ascitis . simethicone PRN  2/11no evidence of SBP on paracentesis. 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 AFB and Triglyerides on ascitic fluid.      Kyphosis of thoracolumbar region  See weakness    Anxiety and depression  Evaluated by Psychiatry at OSH prior to transfer. Recommended increasing bupropion.  - Continue bupropion 300 mg QD  - Gabapentin 600 mg TID for anxiety and neuropathy  - PRN diazepam 5 mg q6h discontinued  2/13   s/p psychiatry eval  for anxiety/depression. xanax discontinued. Decreased Bupropion to 150 mg PO Daily . Started Duloxetine 30 mg PO BID and  PRN Hydroxyzine 25 mg PO q8h for anxiety    Severe obesity (BMI >= 40)  Body mass index is 48.58 kg/m². Morbid obesity complicates all aspects of disease management from diagnostic modalities to treatment. Weight loss encouraged    Substance use disorder  Denies IV drug use (meth) for past 3 years. Denies alcohol and tobacco abuse.     Chronic hepatitis C with cirrhosis  see cirrhosis of liver with ascites      VTE Risk Mitigation (From admission, onward)         Ordered     heparin (porcine) injection 5,000 Units  Every 8 hours         02/04/23 0848     IP VTE HIGH RISK PATIENT  Once         01/19/23 2153     Place sequential compression device  Until discontinued         01/19/23 2153     Reason for No Pharmacological VTE Prophylaxis  Once        Question:  Reasons:  Answer:  Physician Provided (leave comment)  Comment:  Potential NSGY procedure    01/19/23 2153                Discharge Planning   SHIRA: 2/27/2023     Code Status: Partial Code   Is the patient medically ready for discharge?:  No    Reason for patient still in hospital (select all that apply): Patient trending condition, Consult recommendations and Pending disposition  Discharge Plan A: Long-term acute care facility (LTAC)   Discharge Delays: None known at this time              Blas Gilman III, MD  Department of Hospital Medicine   [facility] - Telemetry Stepdown

## 2023-02-26 NOTE — ASSESSMENT & PLAN NOTE
-new cough reported 2/24 with congestion  -COVID swab positive  -discussed risks and benefits and patient has elected to start remdesivir.  -not requiring oxygen so no steroids at this time.  -monitor closely for signs of worsening infection.  -chest x-ray with no acute process  -continue incentive spirometer.  otherwise symptomatic treatment

## 2023-02-26 NOTE — ASSESSMENT & PLAN NOTE
Thoracic diskitis  ESBL E coli infection    42 year old woman s/p multiple spinal surgeries presented to OSH with possible infection of shoulder. Found to have UTI and E. Coli bacteremia and progressively worse BLE weakness. Transferred to [facility] for neurosurgical evaluation. Underwent Laminectomy T8-T10; Posterior spinal fusion T8-T12; hardware removal and washout on 1/24. Admitted to [facility] postop. On 2/2 underwent T4-pelvis fusion and T9-T10 corpectomies. To complete 8 week course of meropenem per ID for ESBL E coli from bone culture, end date 3/29.     - neuro checks q4h  - surgical cultures positive for ESBL, meropenem  until 03/29/2023 per ID  - Plastic surgery following for drain management, wound vac discontinued  - appreciate ID and Plastic surgery assistance  - CMP, Mag, Phos, CBC daily  - MM pain regimen: PRN Dilaudid, Tylenol, gabapentin, robaxin, PRN oxycodone  - PT/OT

## 2023-02-26 NOTE — PLAN OF CARE
Problem: Adult Inpatient Plan of Care  Goal: Plan of Care Review  Outcome: Ongoing, Progressing  Goal: Patient-Specific Goal (Individualized)  Outcome: Ongoing, Progressing  Goal: Absence of Hospital-Acquired Illness or Injury  Outcome: Ongoing, Progressing     Problem: Infection Progression (Sepsis/Septic Shock)  Goal: Absence of Infection Signs and Symptoms  Outcome: Ongoing, Progressing     Problem: Skin Injury Risk Increased  Goal: Skin Health and Integrity  Outcome: Ongoing, Progressing  Intervention: Optimize Skin Protection  Flowsheets (Taken 2/26/2023 1179)  Pressure Reduction Techniques:   frequent weight shift encouraged   weight shift assistance provided  Pressure Reduction Devices:   foam padding utilized   positioning supports utilized  Skin Protection:   adhesive use limited   incontinence pads utilized   silicone foam dressing in place   tubing/devices free from skin contact     Problem: Fall Injury Risk  Goal: Absence of Fall and Fall-Related Injury  Outcome: Ongoing, Progressing     POC reviewed. Pt no acute changes. Pain manage with Oxy/Dilaudid prn. MIHIR X4 in place. Staples removed. Possible discharge tomorrow to LTAC. No s/s of discomfort or distress. Call light in reach. Bed in lowest position.

## 2023-02-26 NOTE — ASSESSMENT & PLAN NOTE
-[facility] in [place] has accepted following staple removal.  -discussed with plastic surgery who plan to remove staples 2/25  -once discharged to LTAC, patient will need outpatient follow-up with Plastic surgery on 3/8/23.  -awaiting LTAC authorization

## 2023-02-26 NOTE — PLAN OF CARE
Problem: Adult Inpatient Plan of Care  Goal: Plan of Care Review  Outcome: Ongoing, Progressing     Problem: Adult Inpatient Plan of Care  Goal: Optimal Comfort and Wellbeing  Outcome: Ongoing, Progressing   POC reviewed with patient. VSS. Complains of back pain, PRN oxycodone given. MIHIR drains intact. No acute changes during shift. Bed alarm set and call light within reach.

## 2023-02-27 VITALS
RESPIRATION RATE: 18 BRPM | DIASTOLIC BLOOD PRESSURE: 65 MMHG | HEIGHT: 67 IN | HEART RATE: 80 BPM | OXYGEN SATURATION: 100 % | SYSTOLIC BLOOD PRESSURE: 109 MMHG | WEIGHT: 293 LBS | TEMPERATURE: 98 F | BODY MASS INDEX: 45.99 KG/M2

## 2023-02-27 PROBLEM — Z02.9 DISCHARGE PLANNING ISSUES: Status: RESOLVED | Noted: 2023-02-24 | Resolved: 2023-02-27

## 2023-02-27 PROBLEM — Z75.8 DISCHARGE PLANNING ISSUES: Status: RESOLVED | Noted: 2023-02-24 | Resolved: 2023-02-27

## 2023-02-27 PROBLEM — E87.6 HYPOKALEMIA: Status: RESOLVED | Noted: 2023-02-16 | Resolved: 2023-02-27

## 2023-02-27 LAB
ANION GAP SERPL CALC-SCNC: 6 MMOL/L (ref 8–16)
BUN SERPL-MCNC: 14 MG/DL (ref 6–20)
CALCIUM SERPL-MCNC: 8 MG/DL (ref 8.7–10.5)
CHLORIDE SERPL-SCNC: 95 MMOL/L (ref 95–110)
CO2 SERPL-SCNC: 31 MMOL/L (ref 23–29)
CREAT SERPL-MCNC: 0.5 MG/DL (ref 0.5–1.4)
ERYTHROCYTE [DISTWIDTH] IN BLOOD BY AUTOMATED COUNT: 16.7 % (ref 11.5–14.5)
EST. GFR  (NO RACE VARIABLE): >60 ML/MIN/1.73 M^2
GLUCOSE SERPL-MCNC: 83 MG/DL (ref 70–110)
HCT VFR BLD AUTO: 25 % (ref 37–48.5)
HCV GENTYP SERPL NAA+PROBE: ABNORMAL
HCV RNA SERPL NAA+PROBE-LOG IU: 4.77 LOG (10) IU/ML
HCV RNA SERPL QL NAA+PROBE: DETECTED
HCV RNA SPEC NAA+PROBE-ACNC: ABNORMAL IU/ML
HGB BLD-MCNC: 7.4 G/DL (ref 12–16)
MAGNESIUM SERPL-MCNC: 1.6 MG/DL (ref 1.6–2.6)
MCH RBC QN AUTO: 27.5 PG (ref 27–31)
MCHC RBC AUTO-ENTMCNC: 29.6 G/DL (ref 32–36)
MCV RBC AUTO: 93 FL (ref 82–98)
PF4 HEPARIN CMPLX AB SER QL: 0.27 OD (ref 0–0.4)
PLATELET # BLD AUTO: 39 K/UL (ref 150–450)
PMV BLD AUTO: 10 FL (ref 9.2–12.9)
POTASSIUM SERPL-SCNC: 3.9 MMOL/L (ref 3.5–5.1)
RBC # BLD AUTO: 2.69 M/UL (ref 4–5.4)
SODIUM SERPL-SCNC: 132 MMOL/L (ref 136–145)
WBC # BLD AUTO: 1.18 K/UL (ref 3.9–12.7)

## 2023-02-27 PROCEDURE — 25000003 PHARM REV CODE 250

## 2023-02-27 PROCEDURE — 25000003 PHARM REV CODE 250: Performed by: HOSPITALIST

## 2023-02-27 PROCEDURE — 80048 BASIC METABOLIC PNL TOTAL CA: CPT | Performed by: FAMILY MEDICINE

## 2023-02-27 PROCEDURE — 99239 HOSP IP/OBS DSCHRG MGMT >30: CPT | Mod: ,,, | Performed by: FAMILY MEDICINE

## 2023-02-27 PROCEDURE — 25000003 PHARM REV CODE 250: Performed by: PSYCHIATRY & NEUROLOGY

## 2023-02-27 PROCEDURE — 20600001 HC STEP DOWN PRIVATE ROOM

## 2023-02-27 PROCEDURE — 63600175 PHARM REV CODE 636 W HCPCS: Performed by: STUDENT IN AN ORGANIZED HEALTH CARE EDUCATION/TRAINING PROGRAM

## 2023-02-27 PROCEDURE — 25000003 PHARM REV CODE 250: Performed by: STUDENT IN AN ORGANIZED HEALTH CARE EDUCATION/TRAINING PROGRAM

## 2023-02-27 PROCEDURE — 27000207 HC ISOLATION

## 2023-02-27 PROCEDURE — 25000003 PHARM REV CODE 250: Performed by: INTERNAL MEDICINE

## 2023-02-27 PROCEDURE — 94761 N-INVAS EAR/PLS OXIMETRY MLT: CPT

## 2023-02-27 PROCEDURE — A4216 STERILE WATER/SALINE, 10 ML: HCPCS | Performed by: PSYCHIATRY & NEUROLOGY

## 2023-02-27 PROCEDURE — 87902 NFCT AGT GNTYP ALYS HEP C: CPT | Performed by: STUDENT IN AN ORGANIZED HEALTH CARE EDUCATION/TRAINING PROGRAM

## 2023-02-27 PROCEDURE — 85027 COMPLETE CBC AUTOMATED: CPT | Performed by: FAMILY MEDICINE

## 2023-02-27 PROCEDURE — 83735 ASSAY OF MAGNESIUM: CPT | Performed by: FAMILY MEDICINE

## 2023-02-27 PROCEDURE — 25000003 PHARM REV CODE 250: Performed by: FAMILY MEDICINE

## 2023-02-27 PROCEDURE — 63600175 PHARM REV CODE 636 W HCPCS

## 2023-02-27 PROCEDURE — 99239 PR HOSPITAL DISCHARGE DAY,>30 MIN: ICD-10-PCS | Mod: ,,, | Performed by: FAMILY MEDICINE

## 2023-02-27 RX ORDER — CALCIUM CARBONATE 200(500)MG
1000 TABLET,CHEWABLE ORAL 3 TIMES DAILY PRN
Status: ON HOLD
Start: 2023-02-27 | End: 2023-07-21 | Stop reason: HOSPADM

## 2023-02-27 RX ORDER — TALC
6 POWDER (GRAM) TOPICAL NIGHTLY PRN
Refills: 0
Start: 2023-02-27

## 2023-02-27 RX ORDER — GUAIFENESIN 600 MG/1
600 TABLET, EXTENDED RELEASE ORAL 2 TIMES DAILY
Qty: 20 TABLET | Refills: 0
Start: 2023-02-27 | End: 2023-03-09

## 2023-02-27 RX ORDER — BUPROPION HYDROCHLORIDE 150 MG/1
150 TABLET ORAL DAILY
Qty: 30 TABLET | Refills: 11 | Status: ON HOLD
Start: 2023-02-28 | End: 2023-06-15

## 2023-02-27 RX ORDER — SIMETHICONE 80 MG
80 TABLET,CHEWABLE ORAL 3 TIMES DAILY PRN
Refills: 0 | Status: ON HOLD
Start: 2023-02-27 | End: 2023-06-15

## 2023-02-27 RX ORDER — AMOXICILLIN 250 MG
1 CAPSULE ORAL 2 TIMES DAILY PRN
Status: ON HOLD
Start: 2023-02-27 | End: 2023-06-15

## 2023-02-27 RX ORDER — ACETAMINOPHEN 500 MG
1000 TABLET ORAL EVERY 12 HOURS
Refills: 0
Start: 2023-02-27

## 2023-02-27 RX ORDER — POLYETHYLENE GLYCOL 3350 17 G/17G
17 POWDER, FOR SOLUTION ORAL DAILY PRN
Refills: 0
Start: 2023-02-27

## 2023-02-27 RX ORDER — ASCORBIC ACID 500 MG
500 TABLET ORAL 2 TIMES DAILY
Status: DISCONTINUED | OUTPATIENT
Start: 2023-02-27 | End: 2023-02-28 | Stop reason: HOSPADM

## 2023-02-27 RX ORDER — PROCHLORPERAZINE EDISYLATE 5 MG/ML
2.5 INJECTION INTRAMUSCULAR; INTRAVENOUS EVERY 6 HOURS PRN
Start: 2023-02-27 | End: 2023-03-09

## 2023-02-27 RX ORDER — THIAMINE HCL 100 MG
100 TABLET ORAL DAILY
Status: DISCONTINUED | OUTPATIENT
Start: 2023-02-27 | End: 2023-02-28 | Stop reason: HOSPADM

## 2023-02-27 RX ORDER — BUMETANIDE 1 MG/1
1 TABLET ORAL DAILY
Qty: 30 TABLET | Refills: 11 | Status: ON HOLD
Start: 2023-02-28 | End: 2023-07-21 | Stop reason: HOSPADM

## 2023-02-27 RX ORDER — ONDANSETRON 2 MG/ML
4 INJECTION INTRAMUSCULAR; INTRAVENOUS EVERY 6 HOURS PRN
Status: ON HOLD
Start: 2023-02-27 | End: 2023-06-15

## 2023-02-27 RX ORDER — DULOXETIN HYDROCHLORIDE 30 MG/1
30 CAPSULE, DELAYED RELEASE ORAL 2 TIMES DAILY
Qty: 60 CAPSULE | Refills: 11 | Status: ON HOLD
Start: 2023-02-27 | End: 2023-06-15

## 2023-02-27 RX ORDER — BENZONATATE 100 MG/1
100 CAPSULE ORAL 3 TIMES DAILY PRN
Start: 2023-02-27 | End: 2023-03-09

## 2023-02-27 RX ORDER — ASCORBIC ACID 500 MG
500 TABLET ORAL 2 TIMES DAILY
Status: ON HOLD
Start: 2023-02-27 | End: 2023-06-15

## 2023-02-27 RX ORDER — GABAPENTIN 300 MG/1
900 CAPSULE ORAL 3 TIMES DAILY
Qty: 270 CAPSULE | Refills: 11 | Status: ON HOLD
Start: 2023-02-27 | End: 2023-07-06 | Stop reason: SDUPTHER

## 2023-02-27 RX ORDER — ZINC SULFATE 50(220)MG
220 CAPSULE ORAL DAILY
Status: DISCONTINUED | OUTPATIENT
Start: 2023-02-27 | End: 2023-02-28 | Stop reason: HOSPADM

## 2023-02-27 RX ORDER — CHOLECALCIFEROL (VITAMIN D3) 25 MCG
1000 TABLET ORAL DAILY
Status: DISCONTINUED | OUTPATIENT
Start: 2023-02-27 | End: 2023-02-28 | Stop reason: HOSPADM

## 2023-02-27 RX ORDER — LANOLIN ALCOHOL/MO/W.PET/CERES
100 CREAM (GRAM) TOPICAL DAILY
Status: ON HOLD
Start: 2023-02-28 | End: 2023-06-15

## 2023-02-27 RX ORDER — METHOCARBAMOL 1000 MG/1
1000 TABLET, COATED ORAL EVERY 6 HOURS
Start: 2023-02-27 | End: 2023-03-09

## 2023-02-27 RX ORDER — HYDROXYZINE HYDROCHLORIDE 25 MG/1
25 TABLET, FILM COATED ORAL 3 TIMES DAILY PRN
Status: ON HOLD
Start: 2023-02-27 | End: 2023-07-21 | Stop reason: HOSPADM

## 2023-02-27 RX ORDER — OXYCODONE HYDROCHLORIDE 10 MG/1
10 TABLET ORAL
Refills: 0 | Status: ON HOLD
Start: 2023-02-27 | End: 2023-06-16 | Stop reason: HOSPADM

## 2023-02-27 RX ORDER — ZINC SULFATE 50(220)MG
220 CAPSULE ORAL DAILY
Status: ON HOLD
Start: 2023-02-28 | End: 2023-06-15

## 2023-02-27 RX ORDER — OXYCODONE HYDROCHLORIDE 5 MG/1
5 TABLET ORAL
Refills: 0 | Status: ON HOLD
Start: 2023-02-27 | End: 2023-06-16 | Stop reason: SDUPTHER

## 2023-02-27 RX ORDER — CHOLECALCIFEROL (VITAMIN D3) 25 MCG
1000 TABLET ORAL DAILY
Status: ON HOLD
Start: 2023-02-28 | End: 2023-06-15

## 2023-02-27 RX ADMIN — Medication 10 ML: at 12:02

## 2023-02-27 RX ADMIN — GABAPENTIN 900 MG: 300 CAPSULE ORAL at 03:02

## 2023-02-27 RX ADMIN — PANTOPRAZOLE SODIUM 40 MG: 40 TABLET, DELAYED RELEASE ORAL at 08:02

## 2023-02-27 RX ADMIN — MEROPENEM 2 G: 1 INJECTION INTRAVENOUS at 10:02

## 2023-02-27 RX ADMIN — ACETAMINOPHEN 1000 MG: 500 TABLET ORAL at 09:02

## 2023-02-27 RX ADMIN — MEROPENEM 2 G: 1 INJECTION INTRAVENOUS at 06:02

## 2023-02-27 RX ADMIN — MEROPENEM 2 G: 1 INJECTION INTRAVENOUS at 03:02

## 2023-02-27 RX ADMIN — ACETAMINOPHEN 1000 MG: 500 TABLET ORAL at 08:02

## 2023-02-27 RX ADMIN — HYDROMORPHONE HYDROCHLORIDE 0.5 MG: 1 INJECTION, SOLUTION INTRAMUSCULAR; INTRAVENOUS; SUBCUTANEOUS at 07:02

## 2023-02-27 RX ADMIN — HYDROMORPHONE HYDROCHLORIDE 0.5 MG: 1 INJECTION, SOLUTION INTRAMUSCULAR; INTRAVENOUS; SUBCUTANEOUS at 10:02

## 2023-02-27 RX ADMIN — LACTULOSE 20 G: 20 SOLUTION ORAL at 08:02

## 2023-02-27 RX ADMIN — Medication 10 ML: at 04:02

## 2023-02-27 RX ADMIN — OXYCODONE HYDROCHLORIDE 10 MG: 10 TABLET ORAL at 09:02

## 2023-02-27 RX ADMIN — Medication 10 ML: at 06:02

## 2023-02-27 RX ADMIN — GUAIFENESIN 600 MG: 600 TABLET, EXTENDED RELEASE ORAL at 09:02

## 2023-02-27 RX ADMIN — CHOLECALCIFEROL TAB 25 MCG (1000 UNIT) 1000 UNITS: 25 TAB at 03:02

## 2023-02-27 RX ADMIN — METHOCARBAMOL 1000 MG: 500 TABLET ORAL at 12:02

## 2023-02-27 RX ADMIN — GUAIFENESIN 600 MG: 600 TABLET, EXTENDED RELEASE ORAL at 08:02

## 2023-02-27 RX ADMIN — DULOXETINE 30 MG: 30 CAPSULE, DELAYED RELEASE ORAL at 08:02

## 2023-02-27 RX ADMIN — METHOCARBAMOL 1000 MG: 500 TABLET ORAL at 11:02

## 2023-02-27 RX ADMIN — OXYCODONE HYDROCHLORIDE 10 MG: 10 TABLET ORAL at 08:02

## 2023-02-27 RX ADMIN — DULOXETINE 30 MG: 30 CAPSULE, DELAYED RELEASE ORAL at 09:02

## 2023-02-27 RX ADMIN — GABAPENTIN 900 MG: 300 CAPSULE ORAL at 09:02

## 2023-02-27 RX ADMIN — BUPROPION HYDROCHLORIDE 150 MG: 150 TABLET, FILM COATED, EXTENDED RELEASE ORAL at 11:02

## 2023-02-27 RX ADMIN — HYDROMORPHONE HYDROCHLORIDE 0.5 MG: 1 INJECTION, SOLUTION INTRAMUSCULAR; INTRAVENOUS; SUBCUTANEOUS at 03:02

## 2023-02-27 RX ADMIN — OXYCODONE HYDROCHLORIDE 10 MG: 10 TABLET ORAL at 06:02

## 2023-02-27 RX ADMIN — OXYCODONE HYDROCHLORIDE AND ACETAMINOPHEN 500 MG: 500 TABLET ORAL at 09:02

## 2023-02-27 RX ADMIN — BUMETANIDE 1 MG: 1 TABLET ORAL at 08:02

## 2023-02-27 RX ADMIN — METHOCARBAMOL 1000 MG: 500 TABLET ORAL at 06:02

## 2023-02-27 RX ADMIN — THIAMINE HCL TAB 100 MG 100 MG: 100 TAB at 03:02

## 2023-02-27 RX ADMIN — GABAPENTIN 900 MG: 300 CAPSULE ORAL at 08:02

## 2023-02-27 RX ADMIN — LACTULOSE 20 G: 20 SOLUTION ORAL at 03:02

## 2023-02-27 RX ADMIN — METHOCARBAMOL 1000 MG: 500 TABLET ORAL at 04:02

## 2023-02-27 RX ADMIN — ZINC SULFATE 220 MG (50 MG) CAPSULE 220 MG: CAPSULE at 03:02

## 2023-02-27 NOTE — NURSING
Patient called  requesting something for pain, pt medicated with prn dilaudid for 8/10 pain and now is resting comfortably and states pain went down to 4/10. Areli bedside Rn updated.

## 2023-02-27 NOTE — CLINICAL REVIEW
Nephrology Chart Review      Intake/Output Summary (Last 24 hours) at 2/27/2023 1217  Last data filed at 2/27/2023 0532  Gross per 24 hour   Intake 480 ml   Output 1125 ml   Net -645 ml       Vitals:    02/27/23 0732 02/27/23 0839 02/27/23 1030 02/27/23 1105   BP:    (!) 107/58   BP Location:    Right arm   Patient Position:    Lying   Pulse: 90   99   Resp:  18 18 18   Temp:    98.4 °F (36.9 °C)   TempSrc:    Oral   SpO2:    99%   Weight:       Height:           Recent Labs   Lab 02/22/23  0636 02/23/23  0359 02/24/23  0557 02/25/23  0402 02/26/23  0548 02/27/23  0402   * 130* 131* 131* 130* 132*   K 3.8 3.6 3.4* 4.1 3.8 3.9   CL 94* 94* 93* 95 94* 95   CO2 30* 29 32* 33* 32* 31*   BUN 19 14 12 12 13 14   CREATININE 0.5 0.5 0.5 0.5 0.5 0.5   CALCIUM 8.5* 8.2* 8.2* 8.0* 7.8* 8.0*   PHOS 2.4* 2.4* 2.3*  --   --   --      Sodium stable; from a nephrology standpoint acceptable in setting of liver cirrhosis. Discussed with patient regarding keeping bumex 1 mg PO and to limit water intake as the best way to maintain her stability.      No other related issues identified. Please call Nephrology as needed; We will sign off at this time.      Spencer Khan MD  PGY-4 Nephrology

## 2023-02-27 NOTE — PROGRESS NOTES
[facility]  Progress Note    Patient Name: Rachel Zazueta  MRN: [id number]  Patient Class: IP- Inpatient   Admission Date: 1/19/2023  Length of Stay: 38 days  Attending Physician: Blas Gilman III,*  Primary Care Provider: Dannielle Merino DO        Subjective:     Principal Problem:Weakness of both lower extremities        HPI:  Ms. Zazueta is a 42 y.o F w/ hx ofIV drug use (methamphetamine), chronic HCV with cirrhosis, spinal fusion (04/2021), epidural abscess with removal of hardware (02/2022), spinal fusion with hardware (T10 - Pelvis / 03/2022), obesity (BMI 39.3) and neurogenic bladder who initially presented to [facility] for evaluation of back pain and left leg weakness.  She reports initially noting the left leg weakness when she was about to go to the bathroom and was about to fall but was assisted by her boyfriend.  She was brought to the ED via EMS.  Urinalysis with UTI concerns and blood cultures at OSH grew ESBL E coli so she was treated with meropenem.  During hospitalization, she reports the weakness that started out in her left leg initially then spread upwards to her left thigh, left hip, then crossed over to the right hip and spread to her right leg.  Denies recent IV drug use. She reports her last incidence of drug use was more than 3 years ago and also denies tobacco, and alcohol abuse.  Reports cannabis use.    She also reports more than 10 years ago she had an episode of a headache that lasted about 4 days and was associated with residual defects of a strabismus in the left eye with associated visual disturbances.  She also reports over the past few years her friends have noticed that she sometimes has word-finding difficulty during conversations.     Reports shortness of breath when sitting up, fever, chills, back pain, lower extremity weakness(initally L>R, but now R>L), diarrhea.  Denies cough, nausea, vomiting, constipation, bladder  or bowel incontinence, dysuria, dark urine, new vision changes.    OSH course notable for concerning urinalysis and blood cultures positive for ESBL E coli treated with meropenem.  She was also admit to the ICU where she was treated with Precedex for significant body aches, irritability, and muscle spasms.  Concerns of a murmur on physical exam so she underwent TTE which did not show any vegetations.  Worsening lower extremity weakness workup with MRI head nonspecific, MRI cervical spine with multilevel spondylosis, X-ray spine with multilevel thoracolumbar fusion and diskectomy, focal kyphosis at T9-10 increased compared to prior.  She was started on prophylaxis amoxicillin due to her history of infected hardware.  MRI spine unable to be completed due to patient's body habitus so she was transferred to [facility] for further imaging and Neurosurgery, Neurology evaluation.      Overview/Hospital Course:  Pt admitted as a transfer from [facility] for worsening LE weakness, concern for spinal infection, and need for MRI which could not be done at OSH. Imaging was completed at this facility.  Admitted to neuro critical Care with assistance of Neurosurgery.  Worsening proximal junctional kyphosis noted at T9-10 with concern for discitis at T8-9, T9-10. NSGY evaluated. Found to have T8-T10 kyphosis on MRI. 1/24 underwent laminectomy T8-T10; posterior fusion T8-T12; and washout. 2/2 underwent T4-pelvis fusion and T9-T10 corpectomy.  ID consulted for thoracic discitis infection.  Patient to complete 8 weeks of therapy with meropenem with surgical cultures positive for ESBL E coli. Patient underwent flap closure with Plastic surgery.  Step-down to Hospital Medicine on 02/08.  Plastic surgery continued following for drain management.  Patient with significant ascites and has had several paracenteses.  Ascitic fluid analysis revealed chylous ascites and IR was consulted to evaluate for possible lymphangiogram which was ultimately  deemed unnecessary.  Nephrology consulted for assistance with hyponatremia and diuresis as well as persistent hypotension.  Patient was started on several days of albumin and ultimately sodium and blood pressure stabilized and patient was able to be started on p.o. Bumex with continued adequate urine output.  Last paracentesis on 02/15/2023 and patient has not experienced significant abdominal distention since then.  Body fluid cultures remained negative.  2/23, patient with hemoglobin down to 6.7 requiring 1 unit PRBC transfusion with Bumex.  Hemoglobin remained stable following transfusion.  On 02/24, patient developed new cough and some congestion, testing positive for COVID.  Discussed with patient and remdesivir initiated.  Plan for discharge to LTAC following suture removal which was completed 2/25.  Discussed with Plastic surgery. At this time, patient is stable for discharge to LTAC with drains in place when authorization is obtained.  Follow-up appointment has been set with Plastic surgery on 03/08/2023 for further drain management.      Interval History:  Patient seen and examined resting in bed, no acute distress.  Patient reports minimal symptoms of COVID including cough and congestion.  Denies any shortness of breath.  Denies any fevers or chills.  No worsening abdominal distention and patient denies nausea, vomiting.    Review of systems:  Negative except for as mentioned above    Objective:     Vital Signs (Most Recent):  Temp: 99.2 °F (37.3 °C) (02/26/23 1500)  Pulse: 98 (02/26/23 1505)  Resp: 19 (02/26/23 1615)  BP: (!) 98/55 (02/26/23 1500)  SpO2: 99 % (02/26/23 1500)   Vital Signs (24h Range):  Temp:  [96.8 °F (36 °C)-99.2 °F (37.3 °C)] 99.2 °F (37.3 °C)  Pulse:  [] 98  Resp:  [16-20] 19  SpO2:  [96 %-99 %] 99 %  BP: ()/(51-57) 98/55     Weight: (!) 142 kg (313 lb 0.9 oz)  Body mass index is 49.03 kg/m².    Intake/Output Summary (Last 24 hours) at 2/26/2023 1833  Last data filed at  2/26/2023 1746  Gross per 24 hour   Intake 1240 ml   Output 1960 ml   Net -720 ml        Physical Exam  Vitals and nursing note reviewed.   Constitutional:       General: She is not in acute distress.     Appearance: She is well-developed. She is obese. She is not toxic-appearing or diaphoretic.   HENT:      Head: Normocephalic and atraumatic.      Nose: Nose normal.      Mouth/Throat:      Mouth: Mucous membranes are moist.      Pharynx: Oropharynx is clear.   Eyes:      General: No scleral icterus.  Cardiovascular:      Rate and Rhythm: Normal rate and regular rhythm.      Pulses: Normal pulses.      Heart sounds: Murmur heard.   Pulmonary:      Effort: Pulmonary effort is normal.      Breath sounds: Normal breath sounds. No wheezing, rhonchi or rales.   Abdominal:      General: There is distension.      Palpations: Abdomen is soft.      Tenderness: There is no abdominal tenderness. There is no guarding or rebound.      Comments: 4 drains in place with serosanguinous fluid   Musculoskeletal:         General: No tenderness.      Right lower leg: Edema present.      Left lower leg: Edema present.   Skin:     General: Skin is warm and dry.      Comments: Chronic venous stasis changes bilateral lower extremities   Neurological:      Mental Status: She is alert and oriented to person, place, and time.   Psychiatric:         Behavior: Behavior normal.       Significant Labs: All pertinent labs within the past 24 hours have been reviewed.      Recent Results (from the past 24 hour(s))   CBC Without Differential    Collection Time: 02/26/23  5:48 AM   Result Value Ref Range    WBC 0.88 (LL) 3.90 - 12.70 K/uL    RBC 2.63 (L) 4.00 - 5.40 M/uL    Hemoglobin 7.5 (L) 12.0 - 16.0 g/dL    Hematocrit 24.3 (L) 37.0 - 48.5 %    MCV 92 82 - 98 fL    MCH 28.5 27.0 - 31.0 pg    MCHC 30.9 (L) 32.0 - 36.0 g/dL    RDW 16.8 (H) 11.5 - 14.5 %    Platelets 35 (LL) 150 - 450 K/uL    MPV 12.0 9.2 - 12.9 fL   Comprehensive Metabolic Panel     Collection Time: 02/26/23  5:48 AM   Result Value Ref Range    Sodium 130 (L) 136 - 145 mmol/L    Potassium 3.8 3.5 - 5.1 mmol/L    Chloride 94 (L) 95 - 110 mmol/L    CO2 32 (H) 23 - 29 mmol/L    Glucose 120 (H) 70 - 110 mg/dL    BUN 13 6 - 20 mg/dL    Creatinine 0.5 0.5 - 1.4 mg/dL    Calcium 7.8 (L) 8.7 - 10.5 mg/dL    Total Protein 4.9 (L) 6.0 - 8.4 g/dL    Albumin 2.5 (L) 3.5 - 5.2 g/dL    Total Bilirubin 0.8 0.1 - 1.0 mg/dL    Alkaline Phosphatase 127 55 - 135 U/L    AST 49 (H) 10 - 40 U/L    ALT 26 10 - 44 U/L    Anion Gap 4 (L) 8 - 16 mmol/L    eGFR >60.0 >60 mL/min/1.73 m^2   Magnesium    Collection Time: 02/26/23  5:48 AM   Result Value Ref Range    Magnesium 1.6 1.6 - 2.6 mg/dL       Significant Imaging: I have reviewed all pertinent imaging results/findings within the past 24 hours.                  Assessment/Plan:      * Weakness of both lower extremities  Thoracic diskitis  ESBL E coli infection  42 year old woman s/p multiple spinal surgeries presented to OSH with possible infection of shoulder. Found to have UTI and E. Coli bacteremia and progressively worse BLE weakness. Transferred to [facility] for neurosurgical evaluation. Underwent Laminectomy T8-T10; Posterior spinal fusion T8-T12; hardware removal and washout on 1/24. Admitted to [facility] postop. On 2/2 underwent T4-pelvis fusion and T9-T10 corpectomies. To complete 8 week course of meropenem per ID for ESBL E coli from bone culture, end date 3/29.     - neuro checks q4h  - surgical cultures positive for ESBL, meropenem  until 03/29/2023 per ID  - Plastic surgery following for drain management, we will discharge to LTAC with drains in place and follow-up outpatient 03/08/2023.  - appreciate ID and Plastic surgery assistance  - CMP, Mag, Phos, CBC daily  - MM pain regimen: PRN Dilaudid, Tylenol, gabapentin, robaxin, PRN oxycodone  - PT/OT     Thoracic discitis  See Weakness of both lower extremities    Cirrhosis of liver with ascites  Chylous  ascites  Hepatitis-C  -MELD-Na score: 9 at 2/23/2023  3:59 AM  -Patient has reportedly refused transplant evaluation in the past.   -Continue Lactulose 20 g TID  -multiple paracenteses throughout admission, last of which 2/15.  Ascitic fluid cultures negative to date.  Fluid analysis consistent with chylous ascites for which IR was consulted to evaluate for possible lymphangiogram which was ultimately deemed not necessary.  -diuresis throughout admission complicated by hyponatremia and hypotension.  Nephrology assistance appreciated.  Ultimately, patient was started on albumin for several days with stabilization of blood pressure and sodium.  -p.o. Bumex continued with adequate urine output.  -evaluate need for paracentesis daily, this has remained stable since 02/15.   -Discussed with hepatology, not a liver transplant candidate.       COVID-19  -new cough reported 2/24 with congestion  -COVID swab positive  -discussed risks and benefits and patient has elected to start remdesivir.  -not requiring oxygen so no steroids at this time.  -monitor closely for signs of worsening infection.  -chest x-ray with no acute process  -continue incentive spirometer.  otherwise symptomatic treatment    Normocytic anemia  -likely related to liver disease  -no active blood loss noted by patient or nursing staff  -hemoglobin 6.7 on 02/23, transfuse 1 unit with dose of Bumex.  Discussed with Nephrology.      Pancytopenia  - acute on chronic  - follows with hematology outpatient  - continuous drop in platelets, heparin products held and hit panel sent.  Discussed with patient who agrees to wear SCDs which have been ordered.    Hyponatremia  -improved  -appreciate nephrology assistance  -likely 2nd to low effective circulating volume due to cirrhosis with possibly poor solute intake relative to free water intake.  Less likely SIADH.  -better with albumin administration which was discontinued on 02/23.  Sodium  stabilized    Hypotension  -multifactorial in the setting of narcotics, liver failure, recurrent chylous ascites, and intermittent diuresis  - given bolus 500 x1 2/19, lactic acid negative  -continues to remain soft, though improved with albumin.  -discussed with nephrology, albumin discontinued 2/23.  - Will continue to monitor blood pressure trend closely and adjust medication regimen as clinically indicated and tolerated      Discharge planning issues  -LTAC in [place] has accepted following staple removal.  -discussed with plastic surgery and staples removed 2/25  -once discharged to LTAC, patient will need outpatient follow-up with Plastic surgery on 3/8/23.  -awaiting LTAC authorization      Abdominal pain  2/10  abdominal X ray -  Nonobstructive bowel gas pattern.  No free air.  As before, mild gaseous distension of stomach. ammonia at 57 . sono abdomen with ascitis . simethicone PRN  2/11no evidence of SBP on paracentesis. 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 AFB and Triglyerides on ascitic fluid.      Kyphosis of thoracolumbar region  See weakness    Anxiety and depression  Evaluated by Psychiatry at OSH prior to transfer. Recommended increasing bupropion.  - Continue bupropion 300 mg QD  - Gabapentin 600 mg TID for anxiety and neuropathy  - PRN diazepam 5 mg q6h discontinued  2/13   s/p psychiatry eval  for anxiety/depression. xanax discontinued. Decreased Bupropion to 150 mg PO Daily . Started Duloxetine 30 mg PO BID and  PRN Hydroxyzine 25 mg PO q8h for anxiety    Severe obesity (BMI >= 40)  Body mass index is 48.58 kg/m². Morbid obesity complicates all aspects of disease management from diagnostic modalities to treatment. Weight loss encouraged    Substance use disorder  Denies IV drug use (meth) for past 3 years. Denies alcohol and tobacco abuse.     Chronic hepatitis C with cirrhosis  see cirrhosis of liver with ascites      VTE Risk  Mitigation (From admission, onward)         Ordered     IP VTE HIGH RISK PATIENT  Once         01/19/23 2153     Place sequential compression device  Until discontinued         01/19/23 2153     Reason for No Pharmacological VTE Prophylaxis  Once        Question:  Reasons:  Answer:  Physician Provided (leave comment)  Comment:  Potential NSGY procedure    01/19/23 2153                Discharge Planning   SHIRA: 2/27/2023     Code Status: Partial Code   Is the patient medically ready for discharge?: Yes    Reason for patient still in hospital (select all that apply): Patient trending condition and Pending disposition  Discharge Plan A: Long-term acute care facility (LTAC)   Discharge Delays: None known at this time              Blas Gilman III, MD  Department of Hospital Medicine   [facility] - Telemetry Stepdown

## 2023-02-27 NOTE — ASSESSMENT & PLAN NOTE
Thoracic diskitis  ESBL E coli infection  42 year old woman s/p multiple spinal surgeries presented to OSH with possible infection of shoulder. Found to have UTI and E. Coli bacteremia and progressively worse BLE weakness. Transferred to [facility] for neurosurgical evaluation. Underwent Laminectomy T8-T10; Posterior spinal fusion T8-T12; hardware removal and washout on 1/24. Admitted to [facility] postop. On 2/2 underwent T4-pelvis fusion and T9-T10 corpectomies. To complete 8 week course of meropenem per ID for ESBL E coli from bone culture, end date 3/29.     - neuro checks q4h  - surgical cultures positive for ESBL, meropenem  until 03/29/2023 per ID  - Plastic surgery following for drain management, we will discharge to LTAC with drains in place and follow-up outpatient 03/08/2023.  - appreciate ID and Plastic surgery assistance  - CMP, Mag, Phos, CBC daily  - MM pain regimen: PRN Dilaudid, Tylenol, gabapentin, robaxin, PRN oxycodone  - PT/OT

## 2023-02-27 NOTE — ASSESSMENT & PLAN NOTE
-improved  -appreciate nephrology assistance  -likely 2nd to low effective circulating volume due to cirrhosis with possibly poor solute intake relative to free water intake.  Less likely SIADH.  -better with albumin administration which was discontinued on 02/23.  Sodium stabilized

## 2023-02-27 NOTE — ASSESSMENT & PLAN NOTE
Chylous ascites  Hepatitis-C  -MELD-Na score: 9 at 2/23/2023  3:59 AM  -Patient has reportedly refused transplant evaluation in the past.   -Continue Lactulose 20 g TID  -multiple paracenteses throughout admission, last of which 2/15.  Ascitic fluid cultures negative to date.  Fluid analysis consistent with chylous ascites for which IR was consulted to evaluate for possible lymphangiogram which was ultimately deemed not necessary.  -diuresis throughout admission complicated by hyponatremia and hypotension.  Nephrology assistance appreciated.  Ultimately, patient was started on albumin for several days with stabilization of blood pressure and sodium.  -p.o. Bumex continued with adequate urine output.  -evaluate need for paracentesis daily, this has remained stable since 02/15.   -Discussed with hepatology, not a liver transplant candidate.

## 2023-02-27 NOTE — PLAN OF CARE
Problem: Adult Inpatient Plan of Care  Goal: Plan of Care Review  Outcome: Ongoing, Progressing  Goal: Patient-Specific Goal (Individualized)  Outcome: Ongoing, Progressing  Goal: Absence of Hospital-Acquired Illness or Injury  Outcome: Ongoing, Progressing  Goal: Optimal Comfort and Wellbeing  Outcome: Ongoing, Progressing     Problem: Adjustment to Illness (Sepsis/Septic Shock)  Goal: Optimal Coping  Outcome: Ongoing, Progressing     Problem: Bleeding (Sepsis/Septic Shock)  Goal: Absence of Bleeding  Outcome: Ongoing, Progressing     Problem: Glycemic Control Impaired (Sepsis/Septic Shock)  Goal: Blood Glucose Level Within Desired Range  Outcome: Ongoing, Progressing     Problem: Infection Progression (Sepsis/Septic Shock)  Goal: Absence of Infection Signs and Symptoms  Outcome: Ongoing, Progressing     Problem: Infection  Goal: Absence of Infection Signs and Symptoms  Outcome: Ongoing, Progressing     Problem: Skin Injury Risk Increased  Goal: Skin Health and Integrity  Outcome: Ongoing, Progressing     Problem: Fall Injury Risk  Goal: Absence of Fall and Fall-Related Injury  Outcome: Ongoing, Progressing     Problem: Adjustment to Illness (Stroke, Hemorrhagic)  Goal: Optimal Coping  Outcome: Ongoing, Progressing     Problem: Bowel Elimination Impaired (Stroke, Hemorrhagic)  Goal: Effective Bowel Elimination  Outcome: Ongoing, Progressing     Problem: Cerebral Tissue Perfusion (Stroke, Hemorrhagic)  Goal: Optimal Cerebral Tissue Perfusion  Outcome: Ongoing, Progressing     Problem: Cognitive Impairment (Stroke, Hemorrhagic)  Goal: Optimal Cognitive Function  Outcome: Ongoing, Progressing     Problem: Communication Impairment (Stroke, Hemorrhagic)  Goal: Effective Communication Skills  Outcome: Ongoing, Progressing     Problem: Functional Ability Impaired (Stroke, Hemorrhagic)  Goal: Optimal Functional Ability  Outcome: Ongoing, Progressing     Problem: Pain (Stroke, Hemorrhagic)  Goal: Acceptable Pain  Control  Outcome: Ongoing, Progressing     Problem: Respiratory Compromise (Stroke, Hemorrhagic)  Goal: Effective Oxygenation and Ventilation  Outcome: Ongoing, Progressing     Problem: Sensorimotor Impairment (Stroke, Hemorrhagic)  Goal: Improved Sensorimotor Function  Outcome: Ongoing, Progressing     Problem: Swallowing Impairment (Stroke, Hemorrhagic)  Goal: Optimal Eating and Swallowing Without Aspiration  Outcome: Ongoing, Progressing     Problem: Urinary Elimination Impaired (Stroke, Hemorrhagic)  Goal: Effective Urinary Elimination  Outcome: Ongoing, Progressing     Problem: Bariatric Environmental Safety  Goal: Safety Maintained with Care  Outcome: Ongoing, Progressing     Problem: Coping Ineffective  Goal: Effective Coping  Outcome: Ongoing, Progressing

## 2023-02-27 NOTE — PLAN OF CARE
On-call CM contacted Elyssa with [facility].   Updated LTAC orders sent in [facility] and faxed to [phone].   Ambulance stretcher requested with [facility] for 7 pm pickup time.   Report may be called at 6:30 pm to [phone].

## 2023-02-27 NOTE — PLAN OF CARE
Problem: Adult Inpatient Plan of Care  Goal: Plan of Care Review  Outcome: Met  Goal: Patient-Specific Goal (Individualized)  Outcome: Met  Goal: Absence of Hospital-Acquired Illness or Injury  Outcome: Met  Goal: Optimal Comfort and Wellbeing  Outcome: Met  Goal: Readiness for Transition of Care  Outcome: Met     Problem: Adjustment to Illness (Sepsis/Septic Shock)  Goal: Optimal Coping  Outcome: Met     Problem: Bleeding (Sepsis/Septic Shock)  Goal: Absence of Bleeding  Outcome: Met     Problem: Glycemic Control Impaired (Sepsis/Septic Shock)  Goal: Blood Glucose Level Within Desired Range  Outcome: Met     Problem: Infection Progression (Sepsis/Septic Shock)  Goal: Absence of Infection Signs and Symptoms  Outcome: Met     Problem: Infection  Goal: Absence of Infection Signs and Symptoms  Outcome: Met     Problem: Skin Injury Risk Increased  Goal: Skin Health and Integrity  Outcome: Met     Problem: Fall Injury Risk  Goal: Absence of Fall and Fall-Related Injury  Outcome: Met     Problem: Adjustment to Illness (Stroke, Hemorrhagic)  Goal: Optimal Coping  Outcome: Met     Problem: Bowel Elimination Impaired (Stroke, Hemorrhagic)  Goal: Effective Bowel Elimination  Outcome: Met     Problem: Cerebral Tissue Perfusion (Stroke, Hemorrhagic)  Goal: Optimal Cerebral Tissue Perfusion  Outcome: Met     Problem: Cognitive Impairment (Stroke, Hemorrhagic)  Goal: Optimal Cognitive Function  Outcome: Met     Problem: Communication Impairment (Stroke, Hemorrhagic)  Goal: Effective Communication Skills  Outcome: Met     Problem: Functional Ability Impaired (Stroke, Hemorrhagic)  Goal: Optimal Functional Ability  Outcome: Met     Problem: Pain (Stroke, Hemorrhagic)  Goal: Acceptable Pain Control  Outcome: Met     Problem: Respiratory Compromise (Stroke, Hemorrhagic)  Goal: Effective Oxygenation and Ventilation  Outcome: Met     Problem: Sensorimotor Impairment (Stroke, Hemorrhagic)  Goal: Improved Sensorimotor  Function  Outcome: Met     Problem: Swallowing Impairment (Stroke, Hemorrhagic)  Goal: Optimal Eating and Swallowing Without Aspiration  Outcome: Met     Problem: Urinary Elimination Impaired (Stroke, Hemorrhagic)  Goal: Effective Urinary Elimination  Outcome: Met     Problem: Bariatric Environmental Safety  Goal: Safety Maintained with Care  Outcome: Met     Problem: Coping Ineffective  Goal: Effective Coping  Outcome: Met

## 2023-02-27 NOTE — ASSESSMENT & PLAN NOTE
-LTAC in [place] has accepted following staple removal.  -discussed with plastic surgery and staples removed 2/25  -once discharged to LTAC, patient will need outpatient follow-up with Plastic surgery on 3/8/23.  -awaiting LTAC authorization

## 2023-02-27 NOTE — PLAN OF CARE
02/27/23 1136   Post-Acute Status   Post-Acute Authorization Placement   Post-Acute Placement Status Pending payor review/awaiting authorization (if required)     Confirmed with Elyssa ([phone]) in admissions that they can bring patient to her follow up appointment with plastics in 1-2 weeks (likely 3/8/23 appt, waiting for confirmation from plastics office). An updated insurance auth is pending.    1:11 PM  Received call from plastics office and confirmed appt time/date, now on AVS. Updated Elyssa at [facility]. Insurance auth has been escalated to leadership.    4:32 PM  Per Elyssa at [facility], they have received insurance auth. Updated MD and RN.    4:47 PM  Per Elyssa, RN can call report an hour after they receive the updated [facility] orders to [phone]. Patient will need ambulance transport to [facility], [address] [place] [address]. Updated on-call CM Michael to continue facilitating transfer to [facility] this evening. Updated patient at bedside who is agreeable for transfer to [facility] this evening.    Milla Hagan, LUIS  [facility]  Ext. [phone]

## 2023-02-27 NOTE — ASSESSMENT & PLAN NOTE
- acute on chronic  - follows with hematology outpatient  - continuous drop in platelets, heparin products held and hit panel sent.  Discussed with patient who agrees to wear SCDs which have been ordered.

## 2023-02-27 NOTE — SUBJECTIVE & OBJECTIVE
Interval History:  Patient seen and examined resting in bed, no acute distress.  Patient reports minimal symptoms of COVID including cough and congestion.  Denies any shortness of breath.  Denies any fevers or chills.  No worsening abdominal distention and patient denies nausea, vomiting.    Review of systems:  Negative except for as mentioned above    Objective:     Vital Signs (Most Recent):  Temp: 99.2 °F (37.3 °C) (02/26/23 1500)  Pulse: 98 (02/26/23 1505)  Resp: 19 (02/26/23 1615)  BP: (!) 98/55 (02/26/23 1500)  SpO2: 99 % (02/26/23 1500)   Vital Signs (24h Range):  Temp:  [96.8 °F (36 °C)-99.2 °F (37.3 °C)] 99.2 °F (37.3 °C)  Pulse:  [] 98  Resp:  [16-20] 19  SpO2:  [96 %-99 %] 99 %  BP: ()/(51-57) 98/55     Weight: (!) 142 kg (313 lb 0.9 oz)  Body mass index is 49.03 kg/m².    Intake/Output Summary (Last 24 hours) at 2/26/2023 1833  Last data filed at 2/26/2023 1746  Gross per 24 hour   Intake 1240 ml   Output 1960 ml   Net -720 ml        Physical Exam  Vitals and nursing note reviewed.   Constitutional:       General: She is not in acute distress.     Appearance: She is well-developed. She is obese. She is not toxic-appearing or diaphoretic.   HENT:      Head: Normocephalic and atraumatic.      Nose: Nose normal.      Mouth/Throat:      Mouth: Mucous membranes are moist.      Pharynx: Oropharynx is clear.   Eyes:      General: No scleral icterus.  Cardiovascular:      Rate and Rhythm: Normal rate and regular rhythm.      Pulses: Normal pulses.      Heart sounds: Murmur heard.   Pulmonary:      Effort: Pulmonary effort is normal.      Breath sounds: Normal breath sounds. No wheezing, rhonchi or rales.   Abdominal:      General: There is distension.      Palpations: Abdomen is soft.      Tenderness: There is no abdominal tenderness. There is no guarding or rebound.      Comments: 4 drains in place with serosanguinous fluid   Musculoskeletal:         General: No tenderness.      Right lower leg:  Edema present.      Left lower leg: Edema present.   Skin:     General: Skin is warm and dry.      Comments: Chronic venous stasis changes bilateral lower extremities   Neurological:      Mental Status: She is alert and oriented to person, place, and time.   Psychiatric:         Behavior: Behavior normal.       Significant Labs: All pertinent labs within the past 24 hours have been reviewed.      Recent Results (from the past 24 hour(s))   CBC Without Differential    Collection Time: 02/26/23  5:48 AM   Result Value Ref Range    WBC 0.88 (LL) 3.90 - 12.70 K/uL    RBC 2.63 (L) 4.00 - 5.40 M/uL    Hemoglobin 7.5 (L) 12.0 - 16.0 g/dL    Hematocrit 24.3 (L) 37.0 - 48.5 %    MCV 92 82 - 98 fL    MCH 28.5 27.0 - 31.0 pg    MCHC 30.9 (L) 32.0 - 36.0 g/dL    RDW 16.8 (H) 11.5 - 14.5 %    Platelets 35 (LL) 150 - 450 K/uL    MPV 12.0 9.2 - 12.9 fL   Comprehensive Metabolic Panel    Collection Time: 02/26/23  5:48 AM   Result Value Ref Range    Sodium 130 (L) 136 - 145 mmol/L    Potassium 3.8 3.5 - 5.1 mmol/L    Chloride 94 (L) 95 - 110 mmol/L    CO2 32 (H) 23 - 29 mmol/L    Glucose 120 (H) 70 - 110 mg/dL    BUN 13 6 - 20 mg/dL    Creatinine 0.5 0.5 - 1.4 mg/dL    Calcium 7.8 (L) 8.7 - 10.5 mg/dL    Total Protein 4.9 (L) 6.0 - 8.4 g/dL    Albumin 2.5 (L) 3.5 - 5.2 g/dL    Total Bilirubin 0.8 0.1 - 1.0 mg/dL    Alkaline Phosphatase 127 55 - 135 U/L    AST 49 (H) 10 - 40 U/L    ALT 26 10 - 44 U/L    Anion Gap 4 (L) 8 - 16 mmol/L    eGFR >60.0 >60 mL/min/1.73 m^2   Magnesium    Collection Time: 02/26/23  5:48 AM   Result Value Ref Range    Magnesium 1.6 1.6 - 2.6 mg/dL       Significant Imaging: I have reviewed all pertinent imaging results/findings within the past 24 hours.

## 2023-02-28 DIAGNOSIS — U07.1 COVID-19 VIRUS DETECTED: ICD-10-CM

## 2023-02-28 NOTE — DISCHARGE SUMMARY
[facility] - Tele[facility] Medicine  Discharge Summary      Patient Name: Rachel Zazueta  MRN: [id number]  KAY: [id number]  Patient Class: IP- Inpatient  Admission Date: 1/19/2023  Hospital Length of Stay: 39 days  Discharge Date and Time:  02/27/2023 9:19 PM  Attending Physician: Blas Gilman III,*   Discharging Provider: Blas Gilman III, MD  Primary Care Provider: Dannielle Merino [facility] Medicine Team: [facility] Blas Gilman III, MD  Primary Care Team: [facility]    HPI:   Ms. Zazueta is a 42 y.o F w/ hx ofIV drug use (methamphetamine), chronic HCV with cirrhosis, spinal fusion (04/2021), epidural abscess with removal of hardware (02/2022), spinal fusion with hardware (T10 - Pelvis / 03/2022), obesity (BMI 39.3) and neurogenic bladder who initially presented to [facility] for evaluation of back pain and left leg weakness.  She reports initially noting the left leg weakness when she was about to go to the bathroom and was about to fall but was assisted by her boyfriend.  She was brought to the ED via EMS.  Urinalysis with UTI concerns and blood cultures at OSH grew ESBL E coli so she was treated with meropenem.  During hospitalization, she reports the weakness that started out in her left leg initially then spread upwards to her left thigh, left hip, then crossed over to the right hip and spread to her right leg.  Denies recent IV drug use. She reports her last incidence of drug use was more than 3 years ago and also denies tobacco, and alcohol abuse.  Reports cannabis use.    She also reports more than 10 years ago she had an episode of a headache that lasted about 4 days and was associated with residual defects of a strabismus in the left eye with associated visual disturbances.  She also reports over the past few years her friends have noticed that she sometimes has word-finding difficulty during conversations.     Reports shortness of breath when  sitting up, fever, chills, back pain, lower extremity weakness(initally L>R, but now R>L), diarrhea.  Denies cough, nausea, vomiting, constipation, bladder or bowel incontinence, dysuria, dark urine, new vision changes.    OSH course notable for concerning urinalysis and blood cultures positive for ESBL E coli treated with meropenem.  She was also admit to the ICU where she was treated with Precedex for significant body aches, irritability, and muscle spasms.  Concerns of a murmur on physical exam so she underwent TTE which did not show any vegetations.  Worsening lower extremity weakness workup with MRI head nonspecific, MRI cervical spine with multilevel spondylosis, X-ray spine with multilevel thoracolumbar fusion and diskectomy, focal kyphosis at T9-10 increased compared to prior.  She was started on prophylaxis amoxicillin due to her history of infected hardware.  MRI spine unable to be completed due to patient's body habitus so [place] [facility] [place] and [place]surgery, Neurology evaluation.      Procedure(s) (LRB):  LAMINECTOMY, SPINE, THORACIC, WITH FUSION (T4-Pelvis fusion w/ T9-10 corpectomies) **AIRO (N/A)      Hospital Course:   [facility] weakness, concern for spinal infection, and need for MRI which could not be done at OSH. Imaging was completed at this facility.  Admitted to neuro critical Care with assistance of Neurosurgery.  Worsening proximal junctional kyphosis noted at T9-10 with concern for discitis at T8-9, T9-10. NSGY evaluated. Found to have T8-T10 kyphosis on MRI. 1/24 underwent laminectomy T8-T10; posterior fusion T8-T12; and washout. 2/2 underwent T4-pelvis fusion and T9-T10 corpectomy.  ID consulted for thoracic discitis infection.  Patient to complete 8 weeks of therapy with meropenem with surgical cultures positive for ESBL E coli. Patient underwent flap closure and drain placed with Plastic surgery.  Step-down to  Hospital Medicine on 02/08.  Plastic surgery continued following for drain management.  Patient with significant ascites and has had several paracenteses.  Ascitic fluid analysis revealed chylous ascites and IR was consulted to evaluate for possible lymphangiogram which was ultimately deemed unnecessary.  Nephrology consulted for assistance with hyponatremia and diuresis as well as persistent hypotension.  Patient was started on several days of albumin and ultimately sodium and blood pressure stabilized and patient was able to be started on p.o. Bumex with continued adequate urine output.  Last paracentesis on 02/15/2023 and patient has not experienced significant abdominal distention since then.  Body fluid cultures remained negative.  2/23, patient with hemoglobin down to 6.7 requiring 1 unit PRBC transfusion with Bumex.  Hemoglobin remained stable following transfusion.  On 02/24, patient developed new cough and some congestion, testing positive for COVID.  Discussed with patient and remdesivir initiated.  Plan for discharge to LTAC following suture removal which was completed 2/25.  Discussed with Plastic surgery. At this time, patient is stable for discharge to LTAC with drains in place when authorization is obtained.  Follow-up appointment has been set with Plastic surgery on 03/08/2023 for further drain management.    The patient's chronic medical conditions were managed appropriately. Discharge plan of care was discussed at length with patient including patient's need for close outpatient follow-up once discharged from LTAC facility.  Patient will need follow-up with PCP, plastic surgery, neuro surgery, hepatology, and Infectious Disease.       Goals of Care Treatment Preferences:  Code Status: Partial Code          What is most important right now is to focus on improvement in condition but with limits to invasive therapies.  Accordingly, we have decided that the best plan to meet the patient's goals  includes continuing with treatment.      Consults:   Consults (From admission, onward)        Status Ordering Provider     Inpatient consult to Palliative Care  Once        Provider:  (Not yet assigned)    Completed SOFYA LEES     Inpatient consult to Registered Dietitian/Nutritionist  Once        Provider:  (Not yet assigned)    Completed NAYELY DÍAZ     Inpatient consult to Psychology  Once        Provider:  (Not yet assigned)    Completed NAYELY DÍAZ     Inpatient consult to Interventional Radiology  Once        Provider:  (Not yet assigned)    Completed NAYELY DÍAZ     Inpatient consult to Psychiatry  Once        Provider:  (Not yet assigned)    Completed NAYELY DÍAZ     Inpatient consult to Hepatology  Once        Provider:  (Not yet assigned)    Completed NAYELY DÍAZ     Inpatient consult to Nephrology  Once        Provider:  (Not yet assigned)    Completed NAYELY DÍAZ     Inpatient consult to Pain Management  Once        Provider:  (Not yet assigned)    Acknowledged KAYLEN COMER     Inpatient consult to PICC team (\[facility] [place] [facility]\"")  Once        Provider:  (Not yet assigned)    Completed CHANDLER VILLATORO     IP consult to case management/social work  Once        Provider:  (Not yet assigned)    Completed CESAR ZUÑIGA     Inpatient consult to Infectious Diseases  Once        Provider:  (Not yet assigned)    Completed FREDY RASHID     Inpatient consult to Social Work/Case Management  Once        Provider:  (Not yet assigned)    Completed DAMION ESPINAL     Inpatient consult to Neurosurgery  Once        Provider:  (Not yet assigned)    Completed AURORA FOWLER            Final Active Diagnoses:    Diagnosis Date Noted POA    PRINCIPAL PROBLEM:  Weakness of both lower extremities [R29.898] 01/20/2023 Yes    Thoracic discitis [M46.44] 01/21/2023 Yes    Cirrhosis of liver with ascites [K74.60, R18.8] 12/06/2016 Yes     Chronic    COVID-19 [U07.1] 02/24/2023  No    Normocytic anemia [D64.9] 03/14/2017 Yes    Pancytopenia [D61.818] 03/04/2015 Yes    Hyponatremia [E87.1] 02/09/2023 Yes    Hypotension [I95.9] 02/19/2023 Yes    Palliative care encounter [Z51.5] 02/23/2023 Not Applicable    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] 02/23/2023 Yes    Infection due to ESBL-producing Escherichia coli [A49.8, Z16.12] 02/23/2023 Yes    Myelopathy [G95.9] 02/20/2023 Yes    Chronic pain syndrome [G89.4] 02/20/2023 Yes    Anxiety and depression [F41.9, F32.A] 01/12/2023 Yes     Chronic    Hyperbilirubinemia [E80.6] 02/15/2022 Yes    Severe obesity (BMI >= 40) [E66.01] 02/09/2022 Yes    Substance use disorder [F19.90] 03/15/2017 Yes     Chronic    Chronic hepatitis C with cirrhosis [B18.2, K74.60] 01/08/2017 Yes     Chronic      Problems Resolved During this Admission:    Diagnosis Date Noted Date Resolved POA    Discharge planning issues [Z02.9] 02/24/2023 02/27/2023 Not Applicable    Hypokalemia [E87.6] 02/16/2023 02/27/2023 Yes    Acute blood loss anemia [D62] 02/06/2023 02/23/2023 No       Discharged Condition: stable    Disposition: Long Term Acute Care    Follow Up:   Follow-up Information     Dannielle Merino DO Follow up.    Specialty: Family Medicine  Why: following discharge from LTAC  Contact information:  [address]  [address] [place] [address] [phone]                       Patient Instructions:      X-Ray Thoracolumbar Spine AP Lateral   Standing Status: Future Standing Exp. Date: 02/20/24     Order Specific Question Answer Comments   May the Radiologist modify the order per protocol to meet the clinical needs of the patient? Yes    Does the patient have a neck collar or brace on? Yes    May the Collar or Brace be removed for the Xray? No    Reason for Exam: s/p fusion, include all hardware T4-pelvis      Activity as tolerated       Significant Diagnostic Studies: Labs:   CMP   Recent Labs   Lab 02/26/23  0548 02/27/23  0402   * 132*   K 3.8 3.9    CL 94* 95   CO2 32* 31*   * 83   BUN 13 14   CREATININE 0.5 0.5   CALCIUM 7.8* 8.0*   PROT 4.9*  --    ALBUMIN 2.5*  --    BILITOT 0.8  --    ALKPHOS 127  --    AST 49*  --    ALT 26  --    ANIONGAP 4* 6*   , CBC   Recent Labs   Lab 02/26/23  0548 02/27/23  0402   WBC 0.88* 1.18*   HGB 7.5* 7.4*   HCT 24.3* 25.0*   PLT 35* 39*    and INR   Lab Results   Component Value Date    INR 1.3 (H) 02/24/2023    INR 1.3 (H) 02/23/2023    INR 1.4 (H) 02/22/2023       Pending Diagnostic Studies:     Procedure Component Value Units Date/Time    CT Interoperative Limited [[id number]] Resulted: 02/02/23 1049    Order Status: Sent Lab Status: In process Updated: 02/02/23 1618    RSV Antigen Detection Nasopharyngeal Swab [[id number]] Collected: 02/24/23 0955    Order Status: Sent Lab Status: No result     X-Ray Thoracolumbar Spine AP Lateral [[id number]]     Order Status: Sent Lab Status: No result          Medications:  Reconciled Home Medications:      Medication List      START taking these medications    acetaminophen 500 MG tablet  Commonly known as: TYLENOL  Take 2 tablets (1,000 mg total) by mouth every 12 (twelve) hours.     ascorbic acid (vitamin C) 500 MG tablet  Commonly known as: VITAMIN C  Take 1 tablet (500 mg total) by mouth 2 (two) times daily.     benzonatate 100 MG capsule  Commonly known as: TESSALON  Take 1 capsule (100 mg total) by mouth 3 (three) times daily as needed for Cough.     bumetanide 1 MG tablet  Commonly known as: BUMEX  Take 1 tablet (1 mg total) by mouth once daily.  Start taking on: February 28, 2023     buPROPion 150 MG TB24 tablet  Commonly known as: WELLBUTRIN XL  Take 1 tablet (150 mg total) by mouth once daily.  Start taking on: February 28, 2023  Replaces: buPROPion 100 MG tablet     calcium carbonate 200 mg calcium (500 mg) chewable tablet  Commonly known as: TUMS  Take 2 tablets (1,000 mg total) by mouth 3 (three) times daily as needed.     DULoxetine 30 MG capsule  Commonly known  as: CYMBALTA  Take 1 capsule (30 mg total) by mouth 2 (two) times daily.     guaiFENesin 600 mg 12 hr tablet  Commonly known as: MUCINEX  Take 1 tablet (600 mg total) by mouth 2 (two) times daily. for 10 days     hydrOXYzine HCL 25 MG tablet  Commonly known as: ATARAX  Take 1 tablet (25 mg total) by mouth 3 (three) times daily as needed for Anxiety.     melatonin 3 mg tablet  Commonly known as: MELATIN  Take 2 tablets (6 mg total) by mouth nightly as needed for Insomnia.     methocarbamoL 1,000 mg Tab  Take 1,000 mg by mouth every 6 (six) hours. for 10 days     ondansetron 4 mg/2 mL Soln  Inject 4 mg into the vein every 6 (six) hours as needed.     * oxyCODONE 5 MG immediate release tablet  Commonly known as: ROXICODONE  Take 1 tablet (5 mg total) by mouth every 3 (three) hours as needed.     * oxyCODONE 10 mg Tab immediate release tablet  Commonly known as: ROXICODONE  Take 1 tablet (10 mg total) by mouth every 3 (three) hours as needed.     polyethylene glycol 17 gram Pwpk  Commonly known as: GLYCOLAX  Take 17 g by mouth daily as needed.     prochlorperazine 10 mg/2 mL (5 mg/mL) Soln injection  Commonly known as: COMPAZINE  Inject 0.5 mLs (2.5 mg total) into the vein every 6 (six) hours as needed.     senna-docusate 8.6-50 mg 8.6-50 mg per tablet  Commonly known as: PERICOLACE  Take 1 tablet by mouth 2 (two) times daily as needed for Constipation.     simethicone 80 MG chewable tablet  Commonly known as: MYLICON  Take 1 tablet (80 mg total) by mouth 3 (three) times daily as needed for Flatulence.     sodium chloride 0.9% SolP 100 mL with meropenem 1 gram SolR 2 g  Inject 2 g into the vein every 8 (eight) hours.     thiamine 100 MG tablet  Take 1 tablet (100 mg total) by mouth once daily.  Start taking on: February 28, 2023     vitamin D 1000 units Tab  Commonly known as: VITAMIN D3  Take 1 tablet (1,000 Units total) by mouth once daily.  Start taking on: February 28, 2023     zinc sulfate 50 mg zinc (220 mg)  capsule  Commonly known as: ZINCATE  Take 1 capsule (220 mg total) by mouth once daily.  Start taking on: February 28, 2023         * This list has 2 medication(s) that are the same as other medications prescribed for you. Read the directions carefully, and ask your doctor or other care provider to review them with you.            CHANGE how you take these medications    gabapentin 300 MG capsule  Commonly known as: NEURONTIN  Take 3 capsules (900 mg total) by mouth 3 (three) times daily.  What changed: how much to take        CONTINUE taking these medications    albuterol 1.25 mg/3 mL Nebu  Commonly known as: ACCUNEB  Take 3 mLs (1.25 mg total) by nebulization every 6 (six) hours as needed (shortness of breath). Rescue     folic acid 1 MG tablet  Commonly known as: FOLVITE  Take 1 tablet (1 mg total) by mouth once daily.     lactulose 20 gram/30 mL Soln  Commonly known as: CHRONULAC  Take 30 mLs (20 g total) by mouth 3 (three) times daily.     pantoprazole 40 MG tablet  Commonly known as: PROTONIX  Take 1 tablet (40 mg total) by mouth once daily.        STOP taking these medications    buPROPion 100 MG tablet  Commonly known as: WELLBUTRIN  Replaced by: buPROPion 150 MG TB24 tablet            Indwelling Lines/Drains at time of discharge:   Lines/Drains/Airways     Peripherally Inserted Central Catheter Line  Duration           PICC Double Lumen 01/26/23 1209 left basilic 32 days          Drain  Duration                Closed/Suction Drain 02/02/23 1401 Right Back Bulb 15 Fr. 25 days         Closed/Suction Drain 02/02/23 1402 Left Back Bulb 15 Fr. 25 days         Closed/Suction Drain 02/02/23 1402 Left Back Bulb 15 Fr. 25 days         Closed/Suction Drain 02/02/23 1402 Right Back Bulb 15 Fr. 25 days    Female External Urinary Catheter 02/10/23 2323 16 days                Time spent on the discharge of patient: 45 minutes         Blas Gilman III, MD  Department of Hospital Medicine  [facility]  Stepdown

## 2023-02-28 NOTE — NURSING
----- Message from Danish Marquez MD sent at 3/22/2018  7:46 AM CDT -----  Tell the patient that all the laboratory studies were normal. If there are any questions I be happy to get back to them.   Patient discharged via stretcher and BLS crew to [facility] with all belongings. Pt without complaints and no s/s of distress noted. VSS, MIHIR drains emptied, surgical sites checked and PICC and midline flushed before patient transferred. MAR and transfer paperwork given to BLS crew to hand off to receiving RN.

## 2023-02-28 NOTE — PLAN OF CARE
[facility] - Telemetry Stepdown  Discharge Final Note    Primary Care Provider: Dannielle Merino DO    Expected Discharge Date: 2/27/2023    Final Discharge Note (most recent)       Final Note - 02/28/23 0849          Final Note    Assessment Type Final Discharge Note     Anticipated Discharge Disposition Long Term Acute [facility] Resources/Appts/Education Provided Appointments scheduled and added to AVS        Post-Acute Status    Post-Acute Authorization Placement     Post-Acute Placement Status Set-up Complete/Auth obtained   [facility]    Discharge Delays None known at this time                   Contact Info       Dannielle Merino DO   Specialty: Family Medicine   Relationship: PCP - General    [address] [place] [address]  [address]  [facility] [address]   Phone: [phone]       Next Steps: Follow up    Instructions: following discharge from LT          Future Appointments   Date Time Provider Department Center   3/8/2023 10:45 AM Eleazar Lewis MD [facility] PLASTIC [facility]   3/16/2023  9:30 AM [facility] EOS [facility]   3/16/2023 10:00 AM Guille Templeton MD [facility] NEUROS8 [facility]   4/18/2023 11:30 AM Tj Uribe MD [facility] ID [facility], LCSW [facility]  Ext. [phone]

## 2023-03-03 ENCOUNTER — PATIENT MESSAGE (OUTPATIENT)
Dept: NEUROSURGERY | Facility: CLINIC | Age: 43
End: 2023-03-03
Payer: MEDICAID

## 2023-03-08 ENCOUNTER — OFFICE VISIT (OUTPATIENT)
Dept: PLASTIC SURGERY | Facility: CLINIC | Age: 43
End: 2023-03-08
Payer: MEDICAID

## 2023-03-08 VITALS — SYSTOLIC BLOOD PRESSURE: 132 MMHG | HEART RATE: 101 BPM | DIASTOLIC BLOOD PRESSURE: 64 MMHG | OXYGEN SATURATION: 100 %

## 2023-03-08 DIAGNOSIS — Z09 SURGERY FOLLOW-UP EXAMINATION: Primary | ICD-10-CM

## 2023-03-08 PROCEDURE — 99999 PR PBB SHADOW E&M-EST. PATIENT-LVL II: CPT | Mod: PBBFAC,,, | Performed by: SURGERY

## 2023-03-08 PROCEDURE — 1160F RVW MEDS BY RX/DR IN RCRD: CPT | Mod: CPTII,,, | Performed by: SURGERY

## 2023-03-08 PROCEDURE — 3078F DIAST BP <80 MM HG: CPT | Mod: CPTII,,, | Performed by: SURGERY

## 2023-03-08 PROCEDURE — 3078F PR MOST RECENT DIASTOLIC BLOOD PRESSURE < 80 MM HG: ICD-10-PCS | Mod: CPTII,,, | Performed by: SURGERY

## 2023-03-08 PROCEDURE — 99024 POSTOP FOLLOW-UP VISIT: CPT | Mod: ,,, | Performed by: SURGERY

## 2023-03-08 PROCEDURE — 3075F SYST BP GE 130 - 139MM HG: CPT | Mod: CPTII,,, | Performed by: SURGERY

## 2023-03-08 PROCEDURE — 1159F PR MEDICATION LIST DOCUMENTED IN MEDICAL RECORD: ICD-10-PCS | Mod: CPTII,,, | Performed by: SURGERY

## 2023-03-08 PROCEDURE — 1160F PR REVIEW ALL MEDS BY PRESCRIBER/CLIN PHARMACIST DOCUMENTED: ICD-10-PCS | Mod: CPTII,,, | Performed by: SURGERY

## 2023-03-08 PROCEDURE — 3075F PR MOST RECENT SYSTOLIC BLOOD PRESS GE 130-139MM HG: ICD-10-PCS | Mod: CPTII,,, | Performed by: SURGERY

## 2023-03-08 PROCEDURE — 99999 PR PBB SHADOW E&M-EST. PATIENT-LVL II: ICD-10-PCS | Mod: PBBFAC,,, | Performed by: SURGERY

## 2023-03-08 PROCEDURE — 99212 OFFICE O/P EST SF 10 MIN: CPT | Mod: PBBFAC | Performed by: SURGERY

## 2023-03-08 PROCEDURE — 99024 PR POST-OP FOLLOW-UP VISIT: ICD-10-PCS | Mod: ,,, | Performed by: SURGERY

## 2023-03-08 PROCEDURE — 1159F MED LIST DOCD IN RCRD: CPT | Mod: CPTII,,, | Performed by: SURGERY

## 2023-03-08 NOTE — PROGRESS NOTES
Plastic Surgery Clinic Postop Visit    Subjective:      Rachel Zazueta is a 42 y.o. year old female who presents to the Plastic Surgery Clinic on 03/08/2023 for follow up visit status post spinal infection, hardware removal/replacement and flap closure on 2/2/2023 with 4 drains. Denies fever, chills, nausea, vomiting, or other systemic signs of infection.    There were no vitals filed for this visit.     Review of patient's allergies indicates:   Allergen Reactions    Bee venom protein (honey bee) Anaphylaxis     Patient reports she is allergic to bee stings.    Naproxen Anaphylaxis     Throat closing    12-23- Patient reports taking Ibuprofen 200 mg at home without problems. Verified X3. [place]    Wasp sting [allergen ext-venom-honey bee] Anaphylaxis    Adhesive Blisters    Shellfish containing products     Iodine and iodide containing products Hives and Itching     Allergic to iodine in seafood only    Nuts [tree nut] Rash       Current Outpatient Medications on File Prior to Visit   Medication Sig Dispense Refill    acetaminophen (TYLENOL) 500 MG tablet Take 2 tablets (1,000 mg total) by mouth every 12 (twelve) hours.  0    albuterol (ACCUNEB) 1.25 mg/3 mL Nebu Take 3 mLs (1.25 mg total) by nebulization every 6 (six) hours as needed (shortness of breath). Rescue 75 mL 0    ascorbic acid, vitamin C, (VITAMIN C) 500 MG tablet Take 1 tablet (500 mg total) by mouth 2 (two) times daily.      benzonatate (TESSALON) 100 MG capsule Take 1 capsule (100 mg total) by mouth 3 (three) times daily as needed for Cough.      bumetanide (BUMEX) 1 MG tablet Take 1 tablet (1 mg total) by mouth once daily. 30 tablet 11    buPROPion (WELLBUTRIN XL) 150 MG TB24 tablet Take 1 tablet (150 mg total) by mouth once daily. 30 tablet 11    calcium carbonate (TUMS) 200 mg calcium (500 mg) chewable tablet Take 2 tablets (1,000 mg total) by mouth 3 (three) times daily as needed.      DULoxetine (CYMBALTA) 30 MG capsule Take 1 capsule  (30 mg total) by mouth 2 (two) times daily. 60 capsule 11    folic acid (FOLVITE) 1 MG tablet Take 1 tablet (1 mg total) by mouth once daily. (Patient not taking: Reported on 11/25/2022) 42 tablet 0    gabapentin (NEURONTIN) 300 MG capsule Take 3 capsules (900 mg total) by mouth 3 (three) times daily. 270 capsule 11    guaiFENesin (MUCINEX) 600 mg 12 hr tablet Take 1 tablet (600 mg total) by mouth 2 (two) times daily. for 10 days 20 tablet 0    hydrOXYzine HCL (ATARAX) 25 MG tablet Take 1 tablet (25 mg total) by mouth 3 (three) times daily as needed for Anxiety.      lactulose (CHRONULAC) 20 gram/30 mL Soln Take 30 mLs (20 g total) by mouth 3 (three) times daily. 2700 mL 2    melatonin (MELATIN) 3 mg tablet Take 2 tablets (6 mg total) by mouth nightly as needed for Insomnia.  0    methocarbamoL 1,000 mg Tab Take 1,000 mg by mouth every 6 (six) hours. for 10 days      ondansetron 4 mg/2 mL Soln Inject 4 mg into the vein every 6 (six) hours as needed.      oxyCODONE (ROXICODONE) 10 mg Tab immediate release tablet Take 1 tablet (10 mg total) by mouth every 3 (three) hours as needed.  0    oxyCODONE (ROXICODONE) 5 MG immediate release tablet Take 1 tablet (5 mg total) by mouth every 3 (three) hours as needed.  0    pantoprazole (PROTONIX) 40 MG tablet Take 1 tablet (40 mg total) by mouth once daily. 30 tablet 1    polyethylene glycol (GLYCOLAX) 17 gram PwPk Take 17 g by mouth daily as needed.  0    prochlorperazine (COMPAZINE) 10 mg/2 mL (5 mg/mL) Soln injection Inject 0.5 mLs (2.5 mg total) into the vein every 6 (six) hours as needed.      senna-docusate 8.6-50 mg (PERICOLACE) 8.6-50 mg per tablet Take 1 tablet by mouth 2 (two) times daily as needed for Constipation.      simethicone (MYLICON) 80 MG chewable tablet Take 1 tablet (80 mg total) by mouth 3 (three) times daily as needed for Flatulence.  0    sodium chloride 0.9% SolP 100 mL with meropenem 1 gram SolR 2 g Inject 2 g into the vein every 8 (eight) hours.       thiamine 100 MG tablet Take 1 tablet (100 mg total) by mouth once daily.      vitamin D (VITAMIN D3) 1000 units Tab Take 1 tablet (1,000 Units total) by mouth once daily.      zinc sulfate (ZINCATE) 50 mg zinc (220 mg) capsule Take 1 capsule (220 mg total) by mouth once daily.      [DISCONTINUED] diclofenac sodium (VOLTAREN) 1 % Gel Apply 2 g topically 4 (four) times daily. 50 g 0     No current facility-administered medications on file prior to visit.       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    Spondylodiscitis    Spinal stenosis at L4-L5 level    Thrombocytopenia    Tobacco use disorder    Amphetamine use disorder, severe    Cannabis use disorder, moderate, dependence    Chronic midline low back pain with sciatica    Severe obesity (BMI >= 40)    Mild episode of recurrent major depressive disorder    Abnormal LFTs    Coagulopathy    Hyperbilirubinemia    Discitis of lumbar region    Edema    S/P spinal fusion    Pyogenic arthritis of left shoulder region    Murmur, cardiac    Severe sepsis    Pulmonary hypertension    Debility    Anxiety and depression    Weakness of both lower extremities    Thoracic discitis    Kyphosis of thoracolumbar region    Hyponatremia    Abdominal pain    Hypotension    Myelopathy    Chronic pain syndrome    Palliative care encounter    Chylous ascites    Infection due to ESBL-producing Escherichia coli    COVID-19       Past Surgical History:   Procedure Laterality Date    ARTHROTOMY OF SHOULDER Left 11/15/2022    Procedure: ARTHROTOMY, SHOULDER;  Surgeon: Bjorn Hayes MD;  Location: [facility];  Service: Orthopedics;  Laterality: Left;  Left acromioclavicular joint arthrotomy    BACK SURGERY      benine tumor removal      forehead, age 9    CHOLECYSTECTOMY      KIDNEY SURGERY      LUMBAR FUSION Bilateral  3/2/2022    Procedure: FUSION, SPINE, LUMBAR;  Surgeon: Guille Templeton MD;  Location: [facility] OR 2ND FLR;  Service: Neurosurgery;  Laterality: Bilateral;  AIRO  T10--Pelvis    LUMBAR FUSION N/A 1/24/2023    Procedure: **AIRO** T8-T12 POSTERIOR FUSION, T8-T10 LAMINECTOMY, HARDWARE REMOVAL AND WASHOUT;  Surgeon: Guille Templeton MD;  Location: [facility] OR [facility] FLR;  Service: Neurosurgery;  Laterality: N/A;    REMOVAL OF HARDWARE FROM SPINE N/A 2/21/2022    Procedure: REMOVAL, HARDWARE, SPINE;  Surgeon: Guille Templeton MD;  Location: [facility] OR 2ND FLR;  Service: Neurosurgery;  Laterality: N/A;  [place]    SPINE SURGERY      THORACIC LAMINECTOMY WITH FUSION N/A 2/2/2023    Procedure: LAMINECTOMY, SPINE, THORACIC, WITH FUSION (T4-Pelvis fusion w/ T9-10 corpectomies) **AIRO;  Surgeon: Guille Templeton MD;  [facility];  Service: Neurosurgery;  Laterality: N/A;  AIRO, 2 bovies, aquamantys, Globus, Depuy, antibiotic beads, TXA, Peroxide; [employer] plastics closure       Social History     Socioeconomic History    Marital status:    Tobacco Use    Smoking status: Some Days     Packs/day: 0.50     Years: 23.00     Pack years: 11.50     Types: Cigarettes    Smokeless tobacco: Never    Tobacco comments:     patient states she knows she nees to quit.   Substance and Sexual Activity    Alcohol use: Not Currently     Comment: quit 2014    Drug use: Yes     Frequency: 4.0 times per week     Types: Marijuana     Comment: Patient denies needle use, only inhalation of methampehtamines or orally.  Last known drug use was prior to admission to hospital stay for surgery    Sexual activity: Yes     Partners: Male     Birth control/protection: None     Social Determinants of Health     Financial Resource Strain: Medium Risk    Difficulty of Paying Living Expenses: Somewhat hard   Food Insecurity: No Food Insecurity    Worried About Running Out of Food in the Last Year: Never true    Ran Out of Food in the Last Year: Never true    Transportation Needs: Unmet Transportation Needs    Lack of Transportation (Medical): Yes    Lack of Transportation (Non-Medical): Yes   Physical Activity: Inactive    Days of Exercise per Week: 0 days    Minutes of Exercise per Session: 0 min   Stress: Stress Concern Present    Feeling of Stress : Very much   Social Connections: Unknown    Frequency of Communication with Friends and Family: More than three times a week    Frequency of Social Gatherings with Friends and Family: Never    Active Member of Clubs or Organizations: No    Attends Club or Organization Meetings: Never    Marital Status:    Housing Stability: High Risk    Unable to Pay for Housing in the Last Year: No    Number of Places Lived in the Last Year: 1    Unstable Housing in the Last Year: Yes           Review of Systems: negative    Objective:     Physical Exam:  There were no vitals filed for this visit.    WD WN NAD  VSS  Normal resp effort  Midline back incision c/d/I with 4 drains (2 bilaterally)        Assessment:       No diagnosis found.    Plan:   42 y.o. female status post spinal infection, hardware removal/replacement and flap closure on 2/2/2023 with 4 drains  - Doing well, no issues  - removed 2 out of 4 drains. Will remove 2 remaining drains next week if output remains low  - Return to clinic in 1 week       All questions were answered. The patient was advised to call the clinic with any questions or concerns prior to their next visit.     Efrem Sutton DO

## 2023-03-13 ENCOUNTER — PATIENT OUTREACH (OUTPATIENT)
Dept: ADMINISTRATIVE | Facility: HOSPITAL | Age: 43
End: 2023-03-13
Payer: MEDICAID

## 2023-03-13 ENCOUNTER — PATIENT MESSAGE (OUTPATIENT)
Dept: ADMINISTRATIVE | Facility: HOSPITAL | Age: 43
End: 2023-03-13
Payer: MEDICAID

## 2023-03-14 LAB
ACID FAST MOD KINY STN SPEC: NORMAL
FUNGUS SPEC CULT: NORMAL
MYCOBACTERIUM SPEC QL CULT: NORMAL

## 2023-03-16 ENCOUNTER — TELEPHONE (OUTPATIENT)
Dept: NEUROSURGERY | Facility: CLINIC | Age: 43
End: 2023-03-16

## 2023-03-16 ENCOUNTER — HOSPITAL ENCOUNTER (OUTPATIENT)
Dept: RADIOLOGY | Facility: HOSPITAL | Age: 43
Discharge: HOME | End: 2023-03-16
Payer: MEDICAID

## 2023-03-16 NOTE — TELEPHONE ENCOUNTER
P/c to pt. Regarding missed appt.her phone not connected. I was able to reach her alternate contact filippo who stated pt is in hospital in [place] at this time.  MD notified.asked him to have her call us when she gets released to reschedule f/u appt. He verbalized understanding.

## 2023-03-24 ENCOUNTER — PATIENT MESSAGE (OUTPATIENT)
Dept: NEUROSURGERY | Facility: CLINIC | Age: 43
End: 2023-03-24
Payer: MEDICAID

## 2023-03-24 NOTE — TELEPHONE ENCOUNTER
P/c to pt. [facility] is at [facility] in . Pt. Had a CT abd/pelvis done on 3/22.  Sent fax to nii in radiology to see if they can upl0ad the scan to life image to the [facility] on [email or web address] told her we would get back with her to let her know if we are able to get the CT image.pt verbalized understanding.

## 2023-03-27 ENCOUNTER — TELEPHONE (OUTPATIENT)
Dept: NEUROSURGERY | Facility: CLINIC | Age: 43
End: 2023-03-27
Payer: MEDICAID

## 2023-03-27 NOTE — TELEPHONE ENCOUNTER
P/c to pt. We did receive the imaging from hospital In  but it did not show enough of spine.  Scheduled her for an xray of thoracolumbar spine for wed. 4/5. Pt. Will get her xray at 9:30am, see bernadette Morales NP at 10am then see dr. Lewis at 10:45. Pt. Was happy to get all appts on same day. And verbalized understanding.

## 2023-03-31 ENCOUNTER — TELEPHONE (OUTPATIENT)
Dept: PLASTIC SURGERY | Facility: CLINIC | Age: 43
End: 2023-03-31
Payer: MEDICAID

## 2023-03-31 NOTE — TELEPHONE ENCOUNTER
Attempts to contact patient, calls reported as being restricted.  Call to patients work number, family answered the phone and reports pt currently in [facility].   Plan to f/u with patient for Monday for additional attempts to discuss r/s of appt booked on 4/5/23.

## 2023-04-03 ENCOUNTER — TELEPHONE (OUTPATIENT)
Dept: PLASTIC SURGERY | Facility: CLINIC | Age: 43
End: 2023-04-03
Payer: MEDICAID

## 2023-04-03 ENCOUNTER — PATIENT MESSAGE (OUTPATIENT)
Dept: ADMINISTRATIVE | Facility: HOSPITAL | Age: 43
End: 2023-04-03
Payer: MEDICAID

## 2023-04-03 LAB
ACID FAST MOD KINY STN SPEC: NORMAL
MYCOBACTERIUM SPEC QL CULT: NORMAL

## 2023-04-03 NOTE — TELEPHONE ENCOUNTER
Unsuccessful Attempt to reach pt RE: r/s appointment.  Calls restricted. Unable to reach patient on [phone]    Additional phone attempt to work number listed  [phone], no answer.  Unable to leave message as mailbox is full.

## 2023-04-10 PROBLEM — A41.9 SEVERE SEPSIS: Status: RESOLVED | Noted: 2022-12-23 | Resolved: 2023-04-10

## 2023-04-10 PROBLEM — R65.20 SEVERE SEPSIS: Status: RESOLVED | Noted: 2022-12-23 | Resolved: 2023-04-10

## 2023-04-15 ENCOUNTER — PATIENT MESSAGE (OUTPATIENT)
Dept: INFECTIOUS DISEASES | Facility: CLINIC | Age: 43
End: 2023-04-15
Payer: MEDICAID

## 2023-04-17 ENCOUNTER — TELEPHONE (OUTPATIENT)
Dept: INFECTIOUS DISEASES | Facility: HOSPITAL | Age: 43
End: 2023-04-17
Payer: MEDICAID

## 2023-04-17 ENCOUNTER — PATIENT MESSAGE (OUTPATIENT)
Dept: INFECTIOUS DISEASES | Facility: HOSPITAL | Age: 43
End: 2023-04-17
Payer: MEDICAID

## 2023-04-18 NOTE — TELEPHONE ENCOUNTER
Discussed concerns with patient. She missed her appointments due to not having transportation arrangements on time, she finished her Abx on 3/29 and had line removed, drains have been pulled and per her report provider at current nursing home examined her wounds and reported to be healing well. She was admitted at [facility] for UTI and discharged with relief in symptoms. Still has some suprapubic tenderness but no dysuria, fevers, rigors, or any worsening of motor or sensation of extremities. She endorses concerns about being dependent on others for help and ADLs when she leaves current facility. I reassured her she should wait and continue to participate in as much rehab as available. No acute infectious concerns at this time.  Unfortunately there is not much we can offer beyond reassurance and encouragement for her to continue to work on rehabilitation. Also encouraged her to ask to get a UA to look for pyuria if continued or worsening symptoms.

## 2023-04-24 ENCOUNTER — PATIENT OUTREACH (OUTPATIENT)
Dept: ADMINISTRATIVE | Facility: HOSPITAL | Age: 43
End: 2023-04-24
Payer: MEDICAID

## 2023-04-24 ENCOUNTER — PATIENT MESSAGE (OUTPATIENT)
Dept: ADMINISTRATIVE | Facility: OTHER | Age: 43
End: 2023-04-24
Payer: MEDICAID

## 2023-04-24 NOTE — PROGRESS NOTES
Pt stated she is in [facility] in a Nursing Home not sure when she will be returning home

## 2023-06-11 ENCOUNTER — HOSPITAL ENCOUNTER (EMERGENCY)
Facility: HOSPITAL | Age: 43
Discharge: ANOTHER HOSPITAL | End: 2023-06-12
Attending: STUDENT IN AN ORGANIZED HEALTH CARE EDUCATION/TRAINING PROGRAM
Payer: MEDICAID

## 2023-06-11 DIAGNOSIS — Z79.899 POLYPHARMACY: ICD-10-CM

## 2023-06-11 DIAGNOSIS — N39.0 ACUTE UTI: ICD-10-CM

## 2023-06-11 DIAGNOSIS — R17 TOTAL BILIRUBIN, ELEVATED: ICD-10-CM

## 2023-06-11 DIAGNOSIS — D64.9 CHRONIC ANEMIA: ICD-10-CM

## 2023-06-11 DIAGNOSIS — I95.9 HYPOTENSION: Primary | ICD-10-CM

## 2023-06-11 LAB
ALBUMIN SERPL BCP-MCNC: 1.7 G/DL (ref 3.5–5.2)
ALP SERPL-CCNC: 116 U/L (ref 55–135)
ALT SERPL W/O P-5'-P-CCNC: 22 U/L (ref 10–44)
AMMONIA PLAS-SCNC: 38 UMOL/L (ref 10–50)
AMPHET+METHAMPHET UR QL: ABNORMAL
ANION GAP SERPL CALC-SCNC: 6 MMOL/L (ref 8–16)
APAP SERPL-MCNC: <3 UG/ML (ref 10–20)
AST SERPL-CCNC: 46 U/L (ref 10–40)
BACTERIA #/AREA URNS HPF: ABNORMAL /HPF
BARBITURATES UR QL SCN>200 NG/ML: NEGATIVE
BASOPHILS # BLD AUTO: 0.03 K/UL (ref 0–0.2)
BASOPHILS NFR BLD: 0.6 % (ref 0–1.9)
BENZODIAZ UR QL SCN>200 NG/ML: ABNORMAL
BILIRUB SERPL-MCNC: 2.8 MG/DL (ref 0.1–1)
BILIRUB UR QL STRIP: ABNORMAL
BNP SERPL-MCNC: 118 PG/ML (ref 0–99)
BUN SERPL-MCNC: 8 MG/DL (ref 6–20)
BZE UR QL SCN: NEGATIVE
CALCIUM SERPL-MCNC: 7.7 MG/DL (ref 8.7–10.5)
CANNABINOIDS UR QL SCN: NEGATIVE
CAOX CRY URNS QL MICRO: ABNORMAL
CHLORIDE SERPL-SCNC: 100 MMOL/L (ref 95–110)
CLARITY UR: ABNORMAL
CO2 SERPL-SCNC: 23 MMOL/L (ref 23–29)
COLOR UR: YELLOW
CREAT SERPL-MCNC: 0.6 MG/DL (ref 0.5–1.4)
CREAT UR-MCNC: 209.2 MG/DL (ref 15–325)
DIFFERENTIAL METHOD: ABNORMAL
EOSINOPHIL # BLD AUTO: 0.1 K/UL (ref 0–0.5)
EOSINOPHIL NFR BLD: 1.7 % (ref 0–8)
ERYTHROCYTE [DISTWIDTH] IN BLOOD BY AUTOMATED COUNT: 18.7 % (ref 11.5–14.5)
EST. GFR  (NO RACE VARIABLE): >60 ML/MIN/1.73 M^2
ETHANOL SERPL-MCNC: <10 MG/DL
GLUCOSE SERPL-MCNC: 125 MG/DL (ref 70–110)
GLUCOSE UR QL STRIP: NEGATIVE
HCT VFR BLD AUTO: 23 % (ref 37–48.5)
HGB BLD-MCNC: 7.2 G/DL (ref 12–16)
HGB UR QL STRIP: ABNORMAL
HYALINE CASTS #/AREA URNS LPF: 0 /LPF
IMM GRANULOCYTES # BLD AUTO: 0.05 K/UL (ref 0–0.04)
IMM GRANULOCYTES NFR BLD AUTO: 0.9 % (ref 0–0.5)
INR PPP: 1.6 (ref 0.8–1.2)
KETONES UR QL STRIP: ABNORMAL
LACTATE SERPL-SCNC: 1.9 MMOL/L (ref 0.5–2.2)
LACTATE SERPL-SCNC: 2.1 MMOL/L (ref 0.5–2.2)
LEUKOCYTE ESTERASE UR QL STRIP: ABNORMAL
LYMPHOCYTES # BLD AUTO: 0.5 K/UL (ref 1–4.8)
LYMPHOCYTES NFR BLD: 9.4 % (ref 18–48)
MCH RBC QN AUTO: 30.8 PG (ref 27–31)
MCHC RBC AUTO-ENTMCNC: 31.3 G/DL (ref 32–36)
MCV RBC AUTO: 98 FL (ref 82–98)
METHADONE UR QL SCN>300 NG/ML: NEGATIVE
MICROSCOPIC COMMENT: ABNORMAL
MONOCYTES # BLD AUTO: 0.4 K/UL (ref 0.3–1)
MONOCYTES NFR BLD: 7.2 % (ref 4–15)
NEUTROPHILS # BLD AUTO: 4.3 K/UL (ref 1.8–7.7)
NEUTROPHILS NFR BLD: 80.2 % (ref 38–73)
NITRITE UR QL STRIP: POSITIVE
NRBC BLD-RTO: 0 /100 WBC
OPIATES UR QL SCN: NEGATIVE
PCP UR QL SCN>25 NG/ML: NEGATIVE
PH UR STRIP: 6 [PH] (ref 5–8)
PLATELET # BLD AUTO: 59 K/UL (ref 150–450)
PMV BLD AUTO: 9.8 FL (ref 9.2–12.9)
POTASSIUM SERPL-SCNC: 3.9 MMOL/L (ref 3.5–5.1)
PROT SERPL-MCNC: 6.1 G/DL (ref 6–8.4)
PROT UR QL STRIP: ABNORMAL
PROTHROMBIN TIME: 16.1 SEC (ref 9–12.5)
RBC # BLD AUTO: 2.34 M/UL (ref 4–5.4)
RBC #/AREA URNS HPF: 35 /HPF (ref 0–4)
SALICYLATES SERPL-MCNC: <5 MG/DL (ref 15–30)
SODIUM SERPL-SCNC: 129 MMOL/L (ref 136–145)
SP GR UR STRIP: >=1.03 (ref 1–1.03)
TOXICOLOGY INFORMATION: ABNORMAL
TROPONIN I SERPL DL<=0.01 NG/ML-MCNC: <0.006 NG/ML (ref 0–0.03)
URN SPEC COLLECT METH UR: ABNORMAL
UROBILINOGEN UR STRIP-ACNC: >=8 EU/DL
WBC # BLD AUTO: 5.4 K/UL (ref 3.9–12.7)
WBC #/AREA URNS HPF: 25 /HPF (ref 0–5)
YEAST URNS QL MICRO: ABNORMAL

## 2023-06-11 PROCEDURE — 87077 CULTURE AEROBIC IDENTIFY: CPT | Performed by: STUDENT IN AN ORGANIZED HEALTH CARE EDUCATION/TRAINING PROGRAM

## 2023-06-11 PROCEDURE — 82140 ASSAY OF AMMONIA: CPT | Performed by: STUDENT IN AN ORGANIZED HEALTH CARE EDUCATION/TRAINING PROGRAM

## 2023-06-11 PROCEDURE — 81000 URINALYSIS NONAUTO W/SCOPE: CPT | Mod: 59 | Performed by: STUDENT IN AN ORGANIZED HEALTH CARE EDUCATION/TRAINING PROGRAM

## 2023-06-11 PROCEDURE — 94760 N-INVAS EAR/PLS OXIMETRY 1: CPT

## 2023-06-11 PROCEDURE — 94761 N-INVAS EAR/PLS OXIMETRY MLT: CPT

## 2023-06-11 PROCEDURE — 93005 ELECTROCARDIOGRAM TRACING: CPT

## 2023-06-11 PROCEDURE — 85610 PROTHROMBIN TIME: CPT | Performed by: STUDENT IN AN ORGANIZED HEALTH CARE EDUCATION/TRAINING PROGRAM

## 2023-06-11 PROCEDURE — 93010 ELECTROCARDIOGRAM REPORT: CPT | Mod: ,,, | Performed by: INTERNAL MEDICINE

## 2023-06-11 PROCEDURE — 87186 SC STD MICRODIL/AGAR DIL: CPT | Performed by: STUDENT IN AN ORGANIZED HEALTH CARE EDUCATION/TRAINING PROGRAM

## 2023-06-11 PROCEDURE — 93010 EKG 12-LEAD: ICD-10-PCS | Mod: ,,, | Performed by: INTERNAL MEDICINE

## 2023-06-11 PROCEDURE — 87088 URINE BACTERIA CULTURE: CPT | Performed by: STUDENT IN AN ORGANIZED HEALTH CARE EDUCATION/TRAINING PROGRAM

## 2023-06-11 PROCEDURE — 85025 COMPLETE CBC W/AUTO DIFF WBC: CPT | Performed by: STUDENT IN AN ORGANIZED HEALTH CARE EDUCATION/TRAINING PROGRAM

## 2023-06-11 PROCEDURE — 63600175 PHARM REV CODE 636 W HCPCS: Performed by: STUDENT IN AN ORGANIZED HEALTH CARE EDUCATION/TRAINING PROGRAM

## 2023-06-11 PROCEDURE — 80307 DRUG TEST PRSMV CHEM ANLYZR: CPT | Performed by: STUDENT IN AN ORGANIZED HEALTH CARE EDUCATION/TRAINING PROGRAM

## 2023-06-11 PROCEDURE — 83880 ASSAY OF NATRIURETIC PEPTIDE: CPT | Performed by: STUDENT IN AN ORGANIZED HEALTH CARE EDUCATION/TRAINING PROGRAM

## 2023-06-11 PROCEDURE — P9612 CATHETERIZE FOR URINE SPEC: HCPCS

## 2023-06-11 PROCEDURE — 36415 COLL VENOUS BLD VENIPUNCTURE: CPT | Performed by: STUDENT IN AN ORGANIZED HEALTH CARE EDUCATION/TRAINING PROGRAM

## 2023-06-11 PROCEDURE — 99900035 HC TECH TIME PER 15 MIN (STAT)

## 2023-06-11 PROCEDURE — 82077 ASSAY SPEC XCP UR&BREATH IA: CPT | Performed by: STUDENT IN AN ORGANIZED HEALTH CARE EDUCATION/TRAINING PROGRAM

## 2023-06-11 PROCEDURE — 99285 EMERGENCY DEPT VISIT HI MDM: CPT | Mod: 25

## 2023-06-11 PROCEDURE — 96365 THER/PROPH/DIAG IV INF INIT: CPT

## 2023-06-11 PROCEDURE — 25000003 PHARM REV CODE 250: Performed by: STUDENT IN AN ORGANIZED HEALTH CARE EDUCATION/TRAINING PROGRAM

## 2023-06-11 PROCEDURE — 80179 DRUG ASSAY SALICYLATE: CPT | Performed by: STUDENT IN AN ORGANIZED HEALTH CARE EDUCATION/TRAINING PROGRAM

## 2023-06-11 PROCEDURE — 80143 DRUG ASSAY ACETAMINOPHEN: CPT | Performed by: STUDENT IN AN ORGANIZED HEALTH CARE EDUCATION/TRAINING PROGRAM

## 2023-06-11 PROCEDURE — 87040 BLOOD CULTURE FOR BACTERIA: CPT | Mod: 59 | Performed by: STUDENT IN AN ORGANIZED HEALTH CARE EDUCATION/TRAINING PROGRAM

## 2023-06-11 PROCEDURE — 96361 HYDRATE IV INFUSION ADD-ON: CPT

## 2023-06-11 PROCEDURE — 87086 URINE CULTURE/COLONY COUNT: CPT | Performed by: STUDENT IN AN ORGANIZED HEALTH CARE EDUCATION/TRAINING PROGRAM

## 2023-06-11 PROCEDURE — 80053 COMPREHEN METABOLIC PANEL: CPT | Performed by: STUDENT IN AN ORGANIZED HEALTH CARE EDUCATION/TRAINING PROGRAM

## 2023-06-11 PROCEDURE — 84484 ASSAY OF TROPONIN QUANT: CPT | Performed by: STUDENT IN AN ORGANIZED HEALTH CARE EDUCATION/TRAINING PROGRAM

## 2023-06-11 PROCEDURE — 25000003 PHARM REV CODE 250: Performed by: SURGERY

## 2023-06-11 PROCEDURE — 25500020 PHARM REV CODE 255: Performed by: STUDENT IN AN ORGANIZED HEALTH CARE EDUCATION/TRAINING PROGRAM

## 2023-06-11 PROCEDURE — 96367 TX/PROPH/DG ADDL SEQ IV INF: CPT

## 2023-06-11 PROCEDURE — 83605 ASSAY OF LACTIC ACID: CPT | Mod: 91 | Performed by: STUDENT IN AN ORGANIZED HEALTH CARE EDUCATION/TRAINING PROGRAM

## 2023-06-11 RX ORDER — CALCIUM GLUCONATE 20 MG/ML
1 INJECTION, SOLUTION INTRAVENOUS
Status: COMPLETED | OUTPATIENT
Start: 2023-06-11 | End: 2023-06-11

## 2023-06-11 RX ORDER — SODIUM CHLORIDE 9 MG/ML
1000 INJECTION, SOLUTION INTRAVENOUS CONTINUOUS
Status: ACTIVE | OUTPATIENT
Start: 2023-06-11 | End: 2023-06-12

## 2023-06-11 RX ADMIN — CALCIUM GLUCONATE 1 G: 20 INJECTION, SOLUTION INTRAVENOUS at 03:06

## 2023-06-11 RX ADMIN — SODIUM CHLORIDE 1000 ML: 9 INJECTION, SOLUTION INTRAVENOUS at 12:06

## 2023-06-11 RX ADMIN — CEFTRIAXONE SODIUM 1 G: 1 INJECTION, POWDER, FOR SOLUTION INTRAMUSCULAR; INTRAVENOUS at 01:06

## 2023-06-11 RX ADMIN — SODIUM CHLORIDE 1000 ML: 9 INJECTION, SOLUTION INTRAVENOUS at 01:06

## 2023-06-11 RX ADMIN — IOHEXOL 100 ML: 350 INJECTION, SOLUTION INTRAVENOUS at 01:06

## 2023-06-11 NOTE — ED NOTES
Pt denies allergy to IV contrast; Dr. Silva spoke with patient. Radiology tech notified it's ok to administer contrast per provider.

## 2023-06-11 NOTE — PROVIDER TRANSFER
(Physician in Lead of Transfers)  Outside Transfer Acceptance Note / Regional Referral Center    Upon patient arrival to floor, please send SecureChat to [facility] or call extension [phone] (if no answer, do NOT leave a callback number after the beep, rather please send a SecureChat to [facility]), for Hospital Medicine admit team assignment and for additional admit orders for the patient.  Do not page the attending physician associated with the patient on arrival (this physician may not be on duty at the time of arrival).  Rather, always send a SecureChat to [facility] or call [phone] to reach the triage physician for orders and team assignment.    Referring facility: [facility]   Referring provider: OSKAR BRUCE  Accepting facility: [facility]  Accepting provider: ALESSIO DAY  Reason for transfer:  Higher level of care  Transfer diagnosis: Cirrhosis  Transfer specialty requested: Hospital Medicine  Transfer specialty notified: Yes  Transfer level: NUMBER 1-5: 2  Bed type requested: stepdown  Isolation status: No active isolations   Admission class or status: IP- Inpatient      Narrative     43-year-old female with a history of HCV, IV drug use, cirrhosis, nephrolithiasis, spinal fusion in 2021, epidural abscess with removal of hardware in 2022, and neurogenic bladder previously admitted to [facility] January 19-February 27 with ESBL E coli bacteremia and thoracic diskitis.  During that stay she also required thoracic fusion.  She had significant ascites requiring several paracenteses.  She was subsequently discharged to LTAC.    She was subsequently admitted to [facility] March 13-April 1 with cirrhosis and ascites.  She underwent TIPS on March 23 and was continued on diuretics.  During her stay she was noted to have pancytopenia and anasarca.  PT notes mentioned that her strength was improving, but she was still unable to stand.  She was  discharged with wheelchair.    She presented to [facility] on June 11 with worsened lower extremity edema and pain.  She reported being off diuretics for a period of time.  She had no chest pain, fever, or dyspnea.  She noted that she has been wheelchair-bound.  She had skin breakdown on her sacrum and lower extremities.  She also reported chronic back pain.  She was noted to have garbled speech and said she took Suboxone and muscle relaxer at home.  Blood pressure was borderline low in the emergency department and she received 2 L IV fluid along with Rocephin. MELD score 22.  With the combination of fluid overload, borderline blood pressure, possible UTI, liver dysfunction, and multiple comorbidities, they are requesting transfer for further treatment/higher level of care.  She had diffuse pain but had no focal areas of acute pain.  Since the fluid administration, systolic blood pressure has remained greater than 100.    Lactic acid 2.1 (repeat 1.9), urine drug screen positive for amphetamine and benzodiazepine, troponin negative, sodium 129, potassium 3.9, chloride 100, CO2 23, BUN 8, creatinine 0.6, glucose 125, total bilirubin 2.8, AST 46, ALT 22, alcohol less than 10, salicylate less than 5, acetaminophen less than 3, INR 1.6, ammonia 38, white blood cells 5.4, hemoglobin 7.2, hematocrit 23, platelets 59  Urinalysis with 1+ protein, trace ketone, 3+ bilirubin, 3+ blood, positive nitrite, trace leukocytes, 35 RBC, 25 WBC, many bacteria, moderate yeast  Blood and urine cultures collected    Chest x-ray had no acute abnormality.    CT abdomen and pelvis noted cirrhosis with splenomegaly and upper abdominal varices.  Right TIPS stent in place.  Small amount of abdominal ascites seen within the left lower quadrant of the abdomen.  Small bilateral pleural effusions.  Anasarca.    EKG showed sinus tachycardia with ventricular rate 110.  Otherwise normal EKG.      Objective     Vitals:  Temp: 97.7 °F (36.5 °C) (06/11/23 1142)  Pulse: 107 (06/11/23 1802)  Resp: 16 (06/11/23 1802)  BP: (!) 102/50 (06/11/23 1802)  SpO2: 100 % (06/11/23 1802)  Recent Labs: CBC:   Recent Labs   Lab 06/11/23  1209   WBC 5.40   HGB 7.2*   HCT 23.0*   PLT 59*     CMP:   Recent Labs   Lab 06/11/23  1209   *   K 3.9      CO2 23   *   BUN 8   CREATININE 0.6   CALCIUM 7.7*   PROT 6.1   ALBUMIN 1.7*   BILITOT 2.8*   ALKPHOS 116   AST 46*   ALT 22   ANIONGAP 6*     Coagulation:   Recent Labs   Lab 06/11/23  1209   INR 1.6*     Lactic Acid:   Recent Labs   Lab 06/11/23  1209 06/11/23  1602   LACTATE 2.1 1.9     Troponin:   Recent Labs   Lab 06/11/23  1209   TROPONINI <0.006         Instructions    Admit to Hospital Medicine      WILBUR Ruiz MD  Hospital Medicine Staff  Cell: [phone]

## 2023-06-11 NOTE — ED NOTES
Patient resting comfortably in ED stretcher. Bed locked in lowest position. Respirations even and unlabored at this time. Call bell within reach. Patient instructed to call with any needs or concerns, verbalized understanding. Will continue to monitor.

## 2023-06-11 NOTE — ED PROVIDER NOTES
"Encounter Date: 6/11/2023       History     Chief Complaint   Patient presents with    Leg Swelling     Pt to ED via [facility] with c/o bilateral lower extremity pain and swelling; pt "noncompliant with fluid pills" -- also having chronic back pain since back surgery in Feb. Garbled speech noted during triage; pt reports took muscle relaxer and suboxone this AM.      42 year old female with a PMHx of anxiety, diskitits, HepC, IV drug abuse, liver cirrhosis presents to the ED with bilateral lower extremity pain and swelling. Patient recently moved to [place] to stay with a friend. She reports she has some sores on her lower extremities. She is also suppose to be on bumex but hasn't taken it for a week. States her legs have been swelling more. No chest pain, no fevers, no nausea, no vomiting, no shortness of breath. Patient states she took a suboxone she had from back in November. Denies any other drug use. She is wheelchair bound. States she normally can get to and from the wheelchair with help but since the painful sores, she's been more weak and unable to. Patient appears to be under the influence. Reports taking a muscle relaxer and her suboxone. No other drugs, no alcohol. IR TIPS procedure on 3/23/2023.      Review of patient's allergies indicates:   Allergen Reactions    Bee venom protein (honey bee) Anaphylaxis     Patient reports she is allergic to bee stings.    Naproxen Anaphylaxis     Throat closing    12-23- Patient reports taking Ibuprofen 200 mg at home without problems. Verified X3. [place]    Wasp sting [allergen ext-venom-honey bee] Anaphylaxis    Adhesive Blisters    Shellfish containing products     Iodine and iodide containing products Hives and Itching     Allergic to iodine in seafood only    Nuts [tree nut] Rash     Past Medical History:   Diagnosis Date    Anemia     Anxiety     Depressed     Diskitis     Hep C w/o coma, chronic     IV drug abuse     Kidney stone     Liver cirrhosis      Past Surgical " History:   Procedure Laterality Date    ARTHROTOMY OF SHOULDER Left 11/15/2022    Procedure: ARTHROTOMY, SHOULDER;  Surgeon: Bjorn Hayes MD;  Location: [facility];  Service: Orthopedics;  Laterality: Left;  Left acromioclavicular joint arthrotomy    BACK SURGERY      benine tumor removal      forehead, age 9    CHOLECYSTECTOMY      KIDNEY SURGERY      LUMBAR FUSION Bilateral 3/2/2022    Procedure: FUSION, SPINE, LUMBAR;  Surgeon: Guille Templeton MD;  [facility];  Service: Neurosurgery;  Laterality: Bilateral;  AIRO  T10--Pelvis    LUMBAR FUSION N/A 1/24/2023    Procedure: **AIRO** T8-T12 POSTERIOR FUSION, T8-T10 LAMINECTOMY, HARDWARE REMOVAL AND WASHOUT;  Surgeon: Guille Templeton MD;  [facility];  Service: Neurosurgery;  Laterality: N/A;    REMOVAL OF HARDWARE FROM SPINE N/A 2/21/2022    Procedure: REMOVAL, HARDWARE, SPINE;  Surgeon: Guille Templeton MD;  [facility];  Service: Neurosurgery;  Laterality: N/A;  [place]    SPINE SURGERY      THORACIC LAMINECTOMY WITH FUSION N/A 2/2/2023    Procedure: LAMINECTOMY, SPINE, THORACIC, WITH FUSION (T4-Pelvis fusion w/ T9-10 corpectomies) **AIRO;  Surgeon: Guille Templeton MD;  [facility];  Service: Neurosurgery;  Laterality: N/A;  AIRO, 2 bovies, aquamantys, Globus, Depuy, antibiotic beads, TXA, Peroxide; [employer]cos plastics closure     Family History   Problem Relation Age of Onset    Hepatitis Mother     Drug abuse Mother     Liver cancer Mother     Drug abuse Father     Cirrhosis Father     Hepatitis Father      Social History     Tobacco Use    Smoking status: Some Days     Packs/day: 0.50     Years: 23.00     Pack years: 11.50     Types: Cigarettes    Smokeless tobacco: Never    Tobacco comments:     patient states she knows she nees to quit.   Substance Use Topics    Alcohol use: Not Currently     Comment: quit 2014    Drug use: Yes     Frequency: 4.0 times per week     Types: Marijuana     Comment: Patient denies  needle use, only inhalation of methampehtamines or orally.  Last known drug use was prior to admission to hospital stay for surgery     Review of Systems   Constitutional:  Negative for chills and fever.   HENT:  Negative for congestion, rhinorrhea and sneezing.    Eyes:  Negative for discharge and redness.   Respiratory:  Negative for cough and shortness of breath.    Cardiovascular:  Positive for leg swelling. Negative for chest pain and palpitations.   Gastrointestinal:  Negative for abdominal pain, diarrhea, nausea and vomiting.   Genitourinary:  Negative for dysuria, frequency, vaginal bleeding and vaginal discharge.   Musculoskeletal:  Positive for back pain (chronic). Negative for neck pain.   Skin:  Positive for wound. Negative for rash.   Neurological:  Negative for weakness, numbness and headaches.     Physical Exam     Initial Vitals   BP Pulse Resp Temp SpO2   06/11/23 1145 06/11/23 1145 06/11/23 1145 06/11/23 1142 06/11/23 1145   96/62 87 18 97.7 °F (36.5 °C) 99 %      MAP       --                Physical Exam    Nursing note and vitals reviewed.  Constitutional: She appears well-developed. She is not diaphoretic. She is Obese . No distress.   HENT:   Head: Normocephalic and atraumatic.   Right Ear: External ear normal.   Left Ear: External ear normal.   Eyes: Right eye exhibits no discharge. Left eye exhibits no discharge. No scleral icterus.   Neck: Neck supple.   Cardiovascular:  Regular rhythm.   Tachycardia present.         Pulmonary/Chest: Breath sounds normal. No stridor. No respiratory distress. She has no wheezes. She has no rhonchi. She has no rales.   Abdominal: Abdomen is soft. no abdominal tenderness There is no guarding.   Musculoskeletal:      Cervical back: Neck supple.      Right lower leg: Swelling present. 3+ Pitting Edema present.      Left lower leg: Swelling present. 3+ Pitting Edema present.     Neurological: She is alert and oriented to person, place, and time.   Skin: Skin is  warm and dry. Capillary refill takes less than 2 seconds.   Psychiatric: She has a normal mood and affect.     Right lower lateral leg      Left knee      Right heel      Right posterior upper thigh      Right thigh      Lumbar scar without overlying redness/abscess/cellulitis        ED Course   Procedures  Labs Reviewed   CBC W/ AUTO DIFFERENTIAL - Abnormal; Notable for the following components:       Result Value    RBC 2.34 (*)     Hemoglobin 7.2 (*)     Hematocrit 23.0 (*)     MCHC 31.3 (*)     RDW 18.7 (*)     Platelets 59 (*)     Immature Granulocytes 0.9 (*)     Immature Grans (Abs) 0.05 (*)     Lymph # 0.5 (*)     Gran % 80.2 (*)     Lymph % 9.4 (*)     All other components within normal limits   COMPREHENSIVE METABOLIC PANEL - Abnormal; Notable for the following components:    Sodium 129 (*)     Glucose 125 (*)     Calcium 7.7 (*)     Albumin 1.7 (*)     Total Bilirubin 2.8 (*)     AST 46 (*)     Anion Gap 6 (*)     All other components within normal limits   URINALYSIS, REFLEX TO URINE CULTURE - Abnormal; Notable for the following components:    Appearance, UA Cloudy (*)     Specific Gravity, UA >=1.030 (*)     Protein, UA 1+ (*)     Ketones, UA Trace (*)     Bilirubin (UA) 3+ (*)     Occult Blood UA 3+ (*)     Nitrite, UA Positive (*)     Urobilinogen, UA >=8.0 (*)     Leukocytes, UA Trace (*)     All other components within normal limits    Narrative:     Specimen Source->Urine   B-TYPE NATRIURETIC PEPTIDE - Abnormal; Notable for the following components:     (*)     All other components within normal limits   DRUG SCREEN PANEL, URINE EMERGENCY - Abnormal; Notable for the following components:    Benzodiazepines Presumptive Positive (*)     Amphetamine Screen, Ur Presumptive Positive (*)     All other components within normal limits    Narrative:     Specimen Source->Urine   SALICYLATE LEVEL - Abnormal; Notable for the following components:    Salicylate Lvl <5.0 (*)     All other components  within normal limits   PROTIME-INR - Abnormal; Notable for the following components:    Prothrombin Time 16.1 (*)     INR 1.6 (*)     All other components within normal limits   ACETAMINOPHEN LEVEL - Abnormal; Notable for the following components:    Acetaminophen (Tylenol), Serum <3.0 (*)     All other components within normal limits   URINALYSIS MICROSCOPIC - Abnormal; Notable for the following components:    RBC, UA 35 (*)     WBC, UA 25 (*)     Bacteria Many (*)     Yeast, UA Moderate (*)     All other components within normal limits    Narrative:     Specimen Source->Urine   CULTURE, BLOOD   CULTURE, BLOOD   CULTURE, URINE   LACTIC ACID, PLASMA   LACTIC ACID, PLASMA   TROPONIN I   ALCOHOL,MEDICAL (ETHANOL)   AMMONIA     EKG Readings: (Independently Interpreted)   Previous EKG: Compared with most recent EKG Previous EKG Date: 2/20/2023.   Sinus tachycardia at 110 bpm. Normal axis. Normal intervals. No STEMI.     Imaging Results              CT Abdomen Pelvis With Contrast (Final result)  Result time 06/11/23 14:13:40      Final result by Veena Kessler MD (06/11/23 14:13:40)                   Impression:      The patient has cirrhosis with splenomegaly and upper abdominal varices.  There is a right tips stent.    There is a small amount of abdominal ascites seen within the left lower quadrant of the abdomen.    There are small bilateral pleural effusions.    There is anasarca.      Electronically signed by: Veena Kessler MD  Date:    06/11/2023  Time:    14:13               Narrative:    EXAMINATION:  CT ABDOMEN PELVIS WITH CONTRAST    CLINICAL HISTORY:  Sepsis;    TECHNIQUE:  Low dose axial images, sagittal and coronal reformations were obtained from the lung bases to the pubic symphysis following the IV administration of 100 mL of Omnipaque 350 and the oral administration of 30 mL of Omnipaque 350.    COMPARISON:  03/22/2023    FINDINGS:  The patient has a tips stent extending from the right hepatic vein  into the right portal vein.  There is a nodular contour of the liver the patient is status post cholecystectomy.    There is splenomegaly.    The adrenal glands, right kidney and pancreas are unremarkable.  There are nonobstructing left renal calculi.  There is no hydronephrosis.    There is no evidence bowel obstruction.  There is a minimal amount of abdominal ascites seen within the left lower quadrant.  There is no evidence of abdominal nor pelvic lymphadenopathy.  There is extensive the spinal fusion with hardware seen throughout the lumbar and thoracic spine.    There are small bilateral pleural effusions with dependent atelectasis.                                       X-Ray Chest AP Portable (Final result)  Result time 06/11/23 13:15:31      Final result by Veena Kessler MD (06/11/23 13:15:31)                   Impression:      No acute abnormality.      Electronically signed by: Veena Kessler MD  Date:    06/11/2023  Time:    13:15               Narrative:    EXAMINATION:  XR CHEST AP PORTABLE    CLINICAL HISTORY:  Sepsis;    TECHNIQUE:  Single frontal view of the chest was performed.    COMPARISON:  None    FINDINGS:  The lungs are clear, with normal appearance of pulmonary vasculature and no pleural effusion or pneumothorax.    The cardiac silhouette is normal in size. The hilar and mediastinal contours are unremarkable.    Bones are intact.                                       Medications   0.9%  NaCl infusion ( Intravenous Restarted 6/11/23 1807)   sodium chloride 0.9% bolus 1,000 mL 1,000 mL (0 mLs Intravenous Stopped 6/11/23 1325)   cefTRIAXone (ROCEPHIN) 1 g in dextrose 5 % in water (D5W) 5 % 100 mL IVPB (MB+) (0 g Intravenous Stopped 6/11/23 1410)   sodium chloride 0.9% bolus 1,000 mL 1,000 mL (0 mLs Intravenous Stopped 6/11/23 1509)   iohexoL (OMNIPAQUE 350) injection 100 mL (100 mLs Intravenous Given 6/11/23 1354)   calcium gluconate 1 g in NS IVPB (premixed) (0 g Intravenous Stopped 6/11/23  9694)     Medical Decision Making:   Differential Diagnosis:   Ddx: sepsis, UTI, peritonitis, polypharmacy, DVT, CHF  ED Management:  Based on the patient's evaluation - patient will need transfer for higher level of care. Hypotension in the ED prompting septic workup. BCX drawn. UTI on UA - treated with ceftriaxone. 2L IV fluids given with improvement of BP - no pressors needed at this time. Lactic negative x 2. Tbili elevated to 2.8, was 2.6 3/24/23.    Patient is a complicated patient. Spoke with Dr. Veloz who was happy to assist. Given the complexity of the patient, may be better suited at a larger hospital vs a critical access hospital.     Will transfer for further evaluation.                        Clinical Impression:   Final diagnoses:  [I95.9] Hypotension (Primary)  [N39.0] Acute UTI  [R17] Total bilirubin, elevated  [D64.9] Chronic anemia  [Z79.899] Polypharmacy        ED Disposition Condition    Transfer to Another Facility Stable                Joshua Silva DO  06/11/23 3601

## 2023-06-12 VITALS
HEIGHT: 67 IN | TEMPERATURE: 98 F | SYSTOLIC BLOOD PRESSURE: 129 MMHG | OXYGEN SATURATION: 98 % | BODY MASS INDEX: 45.99 KG/M2 | DIASTOLIC BLOOD PRESSURE: 62 MMHG | HEART RATE: 116 BPM | RESPIRATION RATE: 32 BRPM | WEIGHT: 293 LBS

## 2023-06-12 LAB
ALBUMIN SERPL BCP-MCNC: 1.7 G/DL (ref 3.5–5.2)
ALP SERPL-CCNC: 113 U/L (ref 55–135)
ALT SERPL W/O P-5'-P-CCNC: 23 U/L (ref 10–44)
ANION GAP SERPL CALC-SCNC: 7 MMOL/L (ref 8–16)
AST SERPL-CCNC: 50 U/L (ref 10–40)
BASOPHILS # BLD AUTO: 0.02 K/UL (ref 0–0.2)
BASOPHILS NFR BLD: 0.6 % (ref 0–1.9)
BILIRUB SERPL-MCNC: 2.7 MG/DL (ref 0.1–1)
BUN SERPL-MCNC: 6 MG/DL (ref 6–20)
CALCIUM SERPL-MCNC: 7.7 MG/DL (ref 8.7–10.5)
CHLORIDE SERPL-SCNC: 106 MMOL/L (ref 95–110)
CO2 SERPL-SCNC: 21 MMOL/L (ref 23–29)
CREAT SERPL-MCNC: 0.5 MG/DL (ref 0.5–1.4)
DIFFERENTIAL METHOD: ABNORMAL
EOSINOPHIL # BLD AUTO: 0.1 K/UL (ref 0–0.5)
EOSINOPHIL NFR BLD: 3 % (ref 0–8)
ERYTHROCYTE [DISTWIDTH] IN BLOOD BY AUTOMATED COUNT: 18.7 % (ref 11.5–14.5)
EST. GFR  (NO RACE VARIABLE): >60 ML/MIN/1.73 M^2
GLUCOSE SERPL-MCNC: 90 MG/DL (ref 70–110)
HCT VFR BLD AUTO: 24.1 % (ref 37–48.5)
HGB BLD-MCNC: 7.4 G/DL (ref 12–16)
IMM GRANULOCYTES # BLD AUTO: 0.04 K/UL (ref 0–0.04)
IMM GRANULOCYTES NFR BLD AUTO: 1.2 % (ref 0–0.5)
LYMPHOCYTES # BLD AUTO: 0.6 K/UL (ref 1–4.8)
LYMPHOCYTES NFR BLD: 17.2 % (ref 18–48)
MCH RBC QN AUTO: 30.7 PG (ref 27–31)
MCHC RBC AUTO-ENTMCNC: 30.7 G/DL (ref 32–36)
MCV RBC AUTO: 100 FL (ref 82–98)
MONOCYTES # BLD AUTO: 0.3 K/UL (ref 0.3–1)
MONOCYTES NFR BLD: 9.2 % (ref 4–15)
NEUTROPHILS # BLD AUTO: 2.3 K/UL (ref 1.8–7.7)
NEUTROPHILS NFR BLD: 68.8 % (ref 38–73)
NRBC BLD-RTO: 0 /100 WBC
PLATELET # BLD AUTO: 73 K/UL (ref 150–450)
PMV BLD AUTO: 8.7 FL (ref 9.2–12.9)
POCT GLUCOSE: 97 MG/DL (ref 70–110)
POTASSIUM SERPL-SCNC: 4.5 MMOL/L (ref 3.5–5.1)
PROT SERPL-MCNC: 6.2 G/DL (ref 6–8.4)
RBC # BLD AUTO: 2.41 M/UL (ref 4–5.4)
SODIUM SERPL-SCNC: 134 MMOL/L (ref 136–145)
WBC # BLD AUTO: 3.37 K/UL (ref 3.9–12.7)

## 2023-06-12 PROCEDURE — 82962 GLUCOSE BLOOD TEST: CPT

## 2023-06-12 PROCEDURE — 36415 COLL VENOUS BLD VENIPUNCTURE: CPT | Performed by: STUDENT IN AN ORGANIZED HEALTH CARE EDUCATION/TRAINING PROGRAM

## 2023-06-12 PROCEDURE — 96375 TX/PRO/DX INJ NEW DRUG ADDON: CPT

## 2023-06-12 PROCEDURE — 25000003 PHARM REV CODE 250: Performed by: SURGERY

## 2023-06-12 PROCEDURE — 63600175 PHARM REV CODE 636 W HCPCS: Performed by: STUDENT IN AN ORGANIZED HEALTH CARE EDUCATION/TRAINING PROGRAM

## 2023-06-12 PROCEDURE — 85025 COMPLETE CBC W/AUTO DIFF WBC: CPT | Performed by: STUDENT IN AN ORGANIZED HEALTH CARE EDUCATION/TRAINING PROGRAM

## 2023-06-12 PROCEDURE — 96361 HYDRATE IV INFUSION ADD-ON: CPT

## 2023-06-12 PROCEDURE — 80053 COMPREHEN METABOLIC PANEL: CPT | Performed by: STUDENT IN AN ORGANIZED HEALTH CARE EDUCATION/TRAINING PROGRAM

## 2023-06-12 PROCEDURE — 96367 TX/PROPH/DG ADDL SEQ IV INF: CPT

## 2023-06-12 PROCEDURE — 25000003 PHARM REV CODE 250: Performed by: STUDENT IN AN ORGANIZED HEALTH CARE EDUCATION/TRAINING PROGRAM

## 2023-06-12 PROCEDURE — 94760 N-INVAS EAR/PLS OXIMETRY 1: CPT | Mod: 59

## 2023-06-12 PROCEDURE — 99900035 HC TECH TIME PER 15 MIN (STAT)

## 2023-06-12 RX ORDER — SODIUM CHLORIDE 9 MG/ML
1000 INJECTION, SOLUTION INTRAVENOUS CONTINUOUS
Status: ACTIVE | OUTPATIENT
Start: 2023-06-12 | End: 2023-06-12

## 2023-06-12 RX ORDER — GABAPENTIN 300 MG/1
900 CAPSULE ORAL 3 TIMES DAILY
Status: DISCONTINUED | OUTPATIENT
Start: 2023-06-12 | End: 2023-06-13 | Stop reason: HOSPADM

## 2023-06-12 RX ORDER — GABAPENTIN 300 MG/1
900 CAPSULE ORAL 3 TIMES DAILY
Status: DISCONTINUED | OUTPATIENT
Start: 2023-06-12 | End: 2023-06-12

## 2023-06-12 RX ORDER — SODIUM CHLORIDE 9 MG/ML
1000 INJECTION, SOLUTION INTRAVENOUS
Status: COMPLETED | OUTPATIENT
Start: 2023-06-12 | End: 2023-06-12

## 2023-06-12 RX ORDER — KETOROLAC TROMETHAMINE 30 MG/ML
15 INJECTION, SOLUTION INTRAMUSCULAR; INTRAVENOUS
Status: COMPLETED | OUTPATIENT
Start: 2023-06-12 | End: 2023-06-12

## 2023-06-12 RX ADMIN — KETOROLAC TROMETHAMINE 15 MG: 30 INJECTION, SOLUTION INTRAMUSCULAR; INTRAVENOUS at 09:06

## 2023-06-12 RX ADMIN — GABAPENTIN 900 MG: 300 CAPSULE ORAL at 11:06

## 2023-06-12 RX ADMIN — SODIUM CHLORIDE 1000 ML: 9 INJECTION, SOLUTION INTRAVENOUS at 09:06

## 2023-06-12 RX ADMIN — GABAPENTIN 900 MG: 300 CAPSULE ORAL at 07:06

## 2023-06-12 RX ADMIN — SODIUM CHLORIDE 1000 ML: 9 INJECTION, SOLUTION INTRAVENOUS at 04:06

## 2023-06-12 RX ADMIN — CEFTRIAXONE SODIUM 1 G: 1 INJECTION, POWDER, FOR SOLUTION INTRAMUSCULAR; INTRAVENOUS at 01:06

## 2023-06-12 RX ADMIN — SODIUM CHLORIDE 1000 ML: 9 INJECTION, SOLUTION INTRAVENOUS at 06:06

## 2023-06-12 NOTE — ED NOTES
Pt's diaper changed and sheets changed. New purewick applied. Pt tolerated well. Pt's feet elevated. No acute distress noted. Breaths even and unlabored.

## 2023-06-12 NOTE — ED NOTES
Pt provided with dinner tray per md's verbal order. Pt provided with meal tray and drink. Pt ate 100% of meal.

## 2023-06-13 ENCOUNTER — HOSPITAL ENCOUNTER (INPATIENT)
Facility: HOSPITAL | Age: 43
LOS: 3 days | Discharge: HOME | DRG: 690 | End: 2023-06-16
Attending: INTERNAL MEDICINE | Admitting: INTERNAL MEDICINE
Payer: MEDICAID

## 2023-06-13 DIAGNOSIS — K74.60 CIRRHOSIS: ICD-10-CM

## 2023-06-13 DIAGNOSIS — Z16.12 INFECTION DUE TO ESBL-PRODUCING ESCHERICHIA COLI: Primary | ICD-10-CM

## 2023-06-13 DIAGNOSIS — A49.8 INFECTION DUE TO ESBL-PRODUCING ESCHERICHIA COLI: Primary | ICD-10-CM

## 2023-06-13 DIAGNOSIS — L03.90 CELLULITIS, UNSPECIFIED CELLULITIS SITE: ICD-10-CM

## 2023-06-13 LAB
ALBUMIN SERPL BCP-MCNC: 1.6 G/DL (ref 3.5–5.2)
ALP SERPL-CCNC: 119 U/L (ref 55–135)
ALT SERPL W/O P-5'-P-CCNC: 22 U/L (ref 10–44)
ANION GAP SERPL CALC-SCNC: 5 MMOL/L (ref 8–16)
AST SERPL-CCNC: 50 U/L (ref 10–40)
BACTERIA UR CULT: ABNORMAL
BILIRUB SERPL-MCNC: 2.1 MG/DL (ref 0.1–1)
BUN SERPL-MCNC: 6 MG/DL (ref 6–20)
CALCIUM SERPL-MCNC: 7.8 MG/DL (ref 8.7–10.5)
CHLORIDE SERPL-SCNC: 108 MMOL/L (ref 95–110)
CO2 SERPL-SCNC: 21 MMOL/L (ref 23–29)
CREAT SERPL-MCNC: 0.5 MG/DL (ref 0.5–1.4)
EST. GFR  (NO RACE VARIABLE): >60 ML/MIN/1.73 M^2
GLUCOSE SERPL-MCNC: 88 MG/DL (ref 70–110)
INR PPP: 1.6 (ref 0.8–1.2)
LACTATE SERPL-SCNC: 1.7 MMOL/L (ref 0.5–2.2)
MAGNESIUM SERPL-MCNC: 1.7 MG/DL (ref 1.6–2.6)
PHOSPHATE SERPL-MCNC: 2.9 MG/DL (ref 2.7–4.5)
POCT GLUCOSE: 86 MG/DL (ref 70–110)
POTASSIUM SERPL-SCNC: 4.3 MMOL/L (ref 3.5–5.1)
PROT SERPL-MCNC: 6.2 G/DL (ref 6–8.4)
PROTHROMBIN TIME: 16.7 SEC (ref 9–12.5)
SODIUM SERPL-SCNC: 134 MMOL/L (ref 136–145)

## 2023-06-13 PROCEDURE — 36415 COLL VENOUS BLD VENIPUNCTURE: CPT | Performed by: STUDENT IN AN ORGANIZED HEALTH CARE EDUCATION/TRAINING PROGRAM

## 2023-06-13 PROCEDURE — 84100 ASSAY OF PHOSPHORUS: CPT | Performed by: STUDENT IN AN ORGANIZED HEALTH CARE EDUCATION/TRAINING PROGRAM

## 2023-06-13 PROCEDURE — 25000003 PHARM REV CODE 250: Performed by: STUDENT IN AN ORGANIZED HEALTH CARE EDUCATION/TRAINING PROGRAM

## 2023-06-13 PROCEDURE — 80053 COMPREHEN METABOLIC PANEL: CPT | Performed by: STUDENT IN AN ORGANIZED HEALTH CARE EDUCATION/TRAINING PROGRAM

## 2023-06-13 PROCEDURE — 99223 PR INITIAL HOSPITAL CARE,LEVL III: ICD-10-PCS | Mod: ,,, | Performed by: STUDENT IN AN ORGANIZED HEALTH CARE EDUCATION/TRAINING PROGRAM

## 2023-06-13 PROCEDURE — 99499 NO LOS: ICD-10-PCS | Mod: ,,, | Performed by: NURSE PRACTITIONER

## 2023-06-13 PROCEDURE — 94761 N-INVAS EAR/PLS OXIMETRY MLT: CPT

## 2023-06-13 PROCEDURE — 83605 ASSAY OF LACTIC ACID: CPT | Performed by: STUDENT IN AN ORGANIZED HEALTH CARE EDUCATION/TRAINING PROGRAM

## 2023-06-13 PROCEDURE — 25000003 PHARM REV CODE 250: Performed by: HOSPITALIST

## 2023-06-13 PROCEDURE — 99499 UNLISTED E&M SERVICE: CPT | Mod: ,,, | Performed by: NURSE PRACTITIONER

## 2023-06-13 PROCEDURE — 99223 1ST HOSP IP/OBS HIGH 75: CPT | Mod: ,,, | Performed by: STUDENT IN AN ORGANIZED HEALTH CARE EDUCATION/TRAINING PROGRAM

## 2023-06-13 PROCEDURE — 99222 1ST HOSP IP/OBS MODERATE 55: CPT | Mod: ,,, | Performed by: NURSE PRACTITIONER

## 2023-06-13 PROCEDURE — 63600175 PHARM REV CODE 636 W HCPCS: Performed by: STUDENT IN AN ORGANIZED HEALTH CARE EDUCATION/TRAINING PROGRAM

## 2023-06-13 PROCEDURE — 83735 ASSAY OF MAGNESIUM: CPT | Performed by: STUDENT IN AN ORGANIZED HEALTH CARE EDUCATION/TRAINING PROGRAM

## 2023-06-13 PROCEDURE — 99222 PR INITIAL HOSPITAL CARE,LEVL II: ICD-10-PCS | Mod: ,,, | Performed by: NURSE PRACTITIONER

## 2023-06-13 PROCEDURE — 85610 PROTHROMBIN TIME: CPT | Performed by: STUDENT IN AN ORGANIZED HEALTH CARE EDUCATION/TRAINING PROGRAM

## 2023-06-13 PROCEDURE — 20600001 HC STEP DOWN PRIVATE ROOM

## 2023-06-13 RX ORDER — TALC
6 POWDER (GRAM) TOPICAL NIGHTLY PRN
Status: DISCONTINUED | OUTPATIENT
Start: 2023-06-13 | End: 2023-06-16 | Stop reason: HOSPADM

## 2023-06-13 RX ORDER — ENOXAPARIN SODIUM 100 MG/ML
40 INJECTION SUBCUTANEOUS EVERY 12 HOURS
Status: DISCONTINUED | OUTPATIENT
Start: 2023-06-13 | End: 2023-06-16

## 2023-06-13 RX ORDER — IBUPROFEN 200 MG
16 TABLET ORAL
Status: DISCONTINUED | OUTPATIENT
Start: 2023-06-13 | End: 2023-06-13

## 2023-06-13 RX ORDER — DULOXETIN HYDROCHLORIDE 30 MG/1
30 CAPSULE, DELAYED RELEASE ORAL 2 TIMES DAILY
Status: DISCONTINUED | OUTPATIENT
Start: 2023-06-13 | End: 2023-06-16 | Stop reason: HOSPADM

## 2023-06-13 RX ORDER — BUPROPION HYDROCHLORIDE 150 MG/1
150 TABLET ORAL DAILY
Status: DISCONTINUED | OUTPATIENT
Start: 2023-06-13 | End: 2023-06-16 | Stop reason: HOSPADM

## 2023-06-13 RX ORDER — SPIRONOLACTONE 100 MG/1
100 TABLET, FILM COATED ORAL DAILY
Status: DISCONTINUED | OUTPATIENT
Start: 2023-06-13 | End: 2023-06-16 | Stop reason: HOSPADM

## 2023-06-13 RX ORDER — SODIUM CHLORIDE 0.9 % (FLUSH) 0.9 %
10 SYRINGE (ML) INJECTION
Status: DISCONTINUED | OUTPATIENT
Start: 2023-06-13 | End: 2023-06-16 | Stop reason: HOSPADM

## 2023-06-13 RX ORDER — THIAMINE HCL 100 MG
100 TABLET ORAL DAILY
Status: DISCONTINUED | OUTPATIENT
Start: 2023-06-13 | End: 2023-06-16 | Stop reason: HOSPADM

## 2023-06-13 RX ORDER — ENOXAPARIN SODIUM 100 MG/ML
40 INJECTION SUBCUTANEOUS EVERY 24 HOURS
Status: DISCONTINUED | OUTPATIENT
Start: 2023-06-13 | End: 2023-06-13

## 2023-06-13 RX ORDER — FUROSEMIDE 10 MG/ML
40 INJECTION INTRAMUSCULAR; INTRAVENOUS DAILY
Status: DISCONTINUED | OUTPATIENT
Start: 2023-06-13 | End: 2023-06-13

## 2023-06-13 RX ORDER — DEXTROSE 40 %
15 GEL (GRAM) ORAL
Status: DISCONTINUED | OUTPATIENT
Start: 2023-06-13 | End: 2023-06-16 | Stop reason: HOSPADM

## 2023-06-13 RX ORDER — LACTULOSE 10 G/15ML
20 SOLUTION ORAL 3 TIMES DAILY
Status: DISCONTINUED | OUTPATIENT
Start: 2023-06-13 | End: 2023-06-16 | Stop reason: HOSPADM

## 2023-06-13 RX ORDER — GABAPENTIN 300 MG/1
900 CAPSULE ORAL 3 TIMES DAILY
Status: DISCONTINUED | OUTPATIENT
Start: 2023-06-13 | End: 2023-06-16 | Stop reason: HOSPADM

## 2023-06-13 RX ORDER — OXYCODONE HYDROCHLORIDE 5 MG/1
5 TABLET ORAL EVERY 6 HOURS PRN
Status: DISCONTINUED | OUTPATIENT
Start: 2023-06-13 | End: 2023-06-16 | Stop reason: HOSPADM

## 2023-06-13 RX ORDER — FUROSEMIDE 10 MG/ML
40 INJECTION INTRAMUSCULAR; INTRAVENOUS 2 TIMES DAILY
Status: DISCONTINUED | OUTPATIENT
Start: 2023-06-13 | End: 2023-06-16

## 2023-06-13 RX ORDER — DEXTROSE 40 %
30 GEL (GRAM) ORAL
Status: DISCONTINUED | OUTPATIENT
Start: 2023-06-13 | End: 2023-06-16 | Stop reason: HOSPADM

## 2023-06-13 RX ORDER — ALBUTEROL SULFATE 90 UG/1
2 AEROSOL, METERED RESPIRATORY (INHALATION) EVERY 6 HOURS PRN
Status: DISCONTINUED | OUTPATIENT
Start: 2023-06-13 | End: 2023-06-16 | Stop reason: HOSPADM

## 2023-06-13 RX ORDER — PANTOPRAZOLE SODIUM 40 MG/1
40 TABLET, DELAYED RELEASE ORAL DAILY
Status: DISCONTINUED | OUTPATIENT
Start: 2023-06-13 | End: 2023-06-16 | Stop reason: HOSPADM

## 2023-06-13 RX ORDER — OXYCODONE HYDROCHLORIDE 10 MG/1
10 TABLET ORAL EVERY 6 HOURS PRN
Status: DISCONTINUED | OUTPATIENT
Start: 2023-06-13 | End: 2023-06-16 | Stop reason: HOSPADM

## 2023-06-13 RX ORDER — IBUPROFEN 200 MG
24 TABLET ORAL
Status: DISCONTINUED | OUTPATIENT
Start: 2023-06-13 | End: 2023-06-13

## 2023-06-13 RX ORDER — GLUCAGON 1 MG
1 KIT INJECTION
Status: DISCONTINUED | OUTPATIENT
Start: 2023-06-13 | End: 2023-06-14

## 2023-06-13 RX ORDER — OXYCODONE HYDROCHLORIDE 10 MG/1
10 TABLET ORAL ONCE
Status: COMPLETED | OUTPATIENT
Start: 2023-06-13 | End: 2023-06-13

## 2023-06-13 RX ORDER — AMOXICILLIN 250 MG
1 CAPSULE ORAL 2 TIMES DAILY PRN
Status: DISCONTINUED | OUTPATIENT
Start: 2023-06-13 | End: 2023-06-16 | Stop reason: HOSPADM

## 2023-06-13 RX ORDER — ZINC SULFATE 50(220)MG
220 CAPSULE ORAL DAILY
Status: DISCONTINUED | OUTPATIENT
Start: 2023-06-14 | End: 2023-06-16 | Stop reason: HOSPADM

## 2023-06-13 RX ADMIN — OXYCODONE HYDROCHLORIDE 5 MG: 5 TABLET ORAL at 10:06

## 2023-06-13 RX ADMIN — DULOXETINE 30 MG: 30 CAPSULE, DELAYED RELEASE ORAL at 08:06

## 2023-06-13 RX ADMIN — MEROPENEM 2 G: 1 INJECTION INTRAVENOUS at 03:06

## 2023-06-13 RX ADMIN — LACTULOSE 20 G: 20 SOLUTION ORAL at 08:06

## 2023-06-13 RX ADMIN — GABAPENTIN 900 MG: 300 CAPSULE ORAL at 04:06

## 2023-06-13 RX ADMIN — GABAPENTIN 900 MG: 300 CAPSULE ORAL at 08:06

## 2023-06-13 RX ADMIN — OXYCODONE HYDROCHLORIDE 10 MG: 10 TABLET ORAL at 08:06

## 2023-06-13 RX ADMIN — MEROPENEM 2 G: 1 INJECTION INTRAVENOUS at 08:06

## 2023-06-13 RX ADMIN — OXYCODONE HYDROCHLORIDE 10 MG: 10 TABLET ORAL at 04:06

## 2023-06-13 RX ADMIN — BUPROPION HYDROCHLORIDE 150 MG: 150 TABLET, FILM COATED, EXTENDED RELEASE ORAL at 08:06

## 2023-06-13 RX ADMIN — LACTULOSE 20 G: 20 SOLUTION ORAL at 04:06

## 2023-06-13 RX ADMIN — VANCOMYCIN HYDROCHLORIDE 1000 MG: 1 INJECTION, POWDER, LYOPHILIZED, FOR SOLUTION INTRAVENOUS at 11:06

## 2023-06-13 RX ADMIN — VANCOMYCIN HYDROCHLORIDE 2000 MG: 500 INJECTION, POWDER, LYOPHILIZED, FOR SOLUTION INTRAVENOUS at 11:06

## 2023-06-13 RX ADMIN — ENOXAPARIN SODIUM 40 MG: 40 INJECTION SUBCUTANEOUS at 08:06

## 2023-06-13 RX ADMIN — MEROPENEM 2 G: 1 INJECTION INTRAVENOUS at 10:06

## 2023-06-13 RX ADMIN — SPIRONOLACTONE 100 MG: 100 TABLET ORAL at 08:06

## 2023-06-13 RX ADMIN — FUROSEMIDE 40 MG: 10 INJECTION, SOLUTION INTRAMUSCULAR; INTRAVENOUS at 05:06

## 2023-06-13 RX ADMIN — FUROSEMIDE 40 MG: 10 INJECTION, SOLUTION INTRAMUSCULAR; INTRAVENOUS at 08:06

## 2023-06-13 RX ADMIN — PANTOPRAZOLE SODIUM 40 MG: 40 TABLET, DELAYED RELEASE ORAL at 08:06

## 2023-06-13 RX ADMIN — Medication 100 MG: at 08:06

## 2023-06-13 NOTE — PLAN OF CARE
Pt a&ox4 during shift. Complaints of pain in back & bilat lower extremities, prn oxycodone ordered. Pt off unit for bilat lower extremity ultrasound. Skin & wound care done throughout shift with frequent turns. No other complaints, safety precautions maintained.     Problem: Adult Inpatient Plan of Care  Goal: Plan of Care Review  Outcome: Ongoing, Progressing  Goal: Patient-Specific Goal (Individualized)  Outcome: Ongoing, Progressing  Goal: Absence of Hospital-Acquired Illness or Injury  Outcome: Ongoing, Progressing  Goal: Optimal Comfort and Wellbeing  Outcome: Ongoing, Progressing  Goal: Readiness for Transition of Care  Outcome: Ongoing, Progressing     Problem: Bariatric Environmental Safety  Goal: Safety Maintained with Care  Outcome: Ongoing, Progressing     Problem: Infection  Goal: Absence of Infection Signs and Symptoms  Outcome: Ongoing, Progressing     Problem: Skin Injury Risk Increased  Goal: Skin Health and Integrity  Outcome: Ongoing, Progressing     Problem: Impaired Wound Healing  Goal: Optimal Wound Healing  Outcome: Ongoing, Progressing

## 2023-06-13 NOTE — CONSULTS
Pharmacokinetic Initial Assessment: IV Vancomycin    Assessment/Plan:    Initiate intravenous vancomycin with loading dose of 2000 mg once followed by a maintenance dose of vancomycin 1000mg IV every 12 hours  Desired empiric serum trough concentration is 10 to 15 mcg/mL  Draw vancomycin trough level 60 min prior to third dose on 6/14 at approximately 1030  Pharmacy will continue to follow and monitor vancomycin.      Thank you for the consult,   Elis Price, PharmD, [facility]  [id number]     Patient brief summary:  Rachel Zazueta is a 42 y.o. female initiated on antimicrobial therapy with IV Vancomycin for treatment of suspected skin & soft tissue infection    Actual Body Weight:   128kg    Renal Function:   Estimated Creatinine Clearance: 204.8 mL/min (based on SCr of 0.5 mg/dL).,     \  CBC (last 72 hours):  Recent Labs   Lab Result Units 06/11/23  1209 06/12/23  0621   WBC K/uL 5.40 3.37*   Hemoglobin g/dL 7.2* 7.4*   Hematocrit % 23.0* 24.1*   Platelets K/uL 59* 73*   Gran % % 80.2* 68.8   Lymph % % 9.4* 17.2*   Mono % % 7.2 9.2   Eosinophil % % 1.7 3.0   Basophil % % 0.6 0.6   Differential Method  Automated Automated       Metabolic Panel (last 72 hours):  Recent Labs   Lab Result Units 06/11/23  1209 06/11/23  1305 06/12/23  0621 06/13/23  0554   Sodium mmol/L 129*  --  134* 134*   Potassium mmol/L 3.9  --  4.5 4.3   Chloride mmol/L 100  --  106 108   CO2 mmol/L 23  --  21* 21*   Glucose mg/dL 125*  --  90 88   Glucose, UA   --  Negative  --   --    BUN mg/dL 8  --  6 6   Creatinine mg/dL 0.6  --  0.5 0.5   Creatinine, Urine mg/dL  --  209.2  --   --    Albumin g/dL 1.7*  --  1.7* 1.6*   Total Bilirubin mg/dL 2.8*  --  2.7* 2.1*   Alkaline Phosphatase U/L 116  --  113 119   AST U/L 46*  --  50* 50*   ALT U/L 22  --  23 22   Magnesium mg/dL  --   --   --  1.7   Phosphorus mg/dL  --   --   --  2.9       Drug levels (last 3 results):  No results for input(s): VANCOMYCINRA, VANCORANDOM, VANCOMYCINPE, VANCOPEAK,  VANCOMYCINTR, VANCOTROUGH in the last 72 hours.    Microbiologic Results:  Microbiology Results (last 7 days)       ** No results found for the last 168 hours. **

## 2023-06-13 NOTE — H&P
[facility] - [facility]  History & Physical    Patient Name: Rachel Zazueta  MRN: [id number]  Patient Class: IP- Inpatient  Admission Date: 6/13/2023  Attending Physician: Michael Ruiz MD   Primary Care Provider: Dannielle Merino DO     Patient information was obtained from patient and ER records.     Subjective:     Principal Problem:UTI (urinary tract infection)    Chief Complaint:   Chief Complaint   Patient presents with    Edema        HPI: 43-year-old female with a history of HCV, IV drug use, Cirrhosis, Nephrolithiasis, Spinal fusion in 2021, Epidural abscess s/p hardware removal in 2022, and neurogenic bladder presents as a transfer from [facility] ED after she presented on 06/11 with worsening lower extremity edema, swelling and pain. Patient is wheelchair-bound. In addition, she also complains of chronic back pain and skin breakdown in her sacrum and lower extremities. She reports having taken a muscle relaxant and Suboxone at home with no relief. She denies any fever, chills, nausea, vomiting, hematemesis, cough, chest pain, palpitations, shortness of breath, abdominal pain/distension, changes in bowel or urinary habits, no hematochezia or melena. In the ED,she was noted have borderline low blood pressure and tachycardic on arrival. She received 2L of IV luids and started on Ceftriaxone. Patient was than transferred for further care    Of note she was [place] [facility] [place] with ESBL E coli bacteremia and thoracic diskitis s/p thoracic fusion. She had significant ascites requiring several paracenteses and was then discharged to LTAC. The following month she presented to [facility] 3/13-4/1/2023 with worsening ascites and cirrhosis. She underwent TIPS on 3/23. At the time she had developed Anasarca and worsening pancytopenia.       Past Medical History:   Diagnosis Date    Anemia     Anxiety     Depressed      Diskitis     Hep C w/o coma, chronic     IV drug abuse     Kidney stone     Liver cirrhosis      Past Surgical History:   Procedure Laterality Date    ARTHROTOMY OF SHOULDER Left 11/15/2022    Procedure: ARTHROTOMY, SHOULDER;  Surgeon: Bjorn Hayes MD;  Location: [facility];  Service: Orthopedics;  Laterality: Left;  Left acromioclavicular joint arthrotomy    BACK SURGERY      benine tumor removal      forehead, age 9    CHOLECYSTECTOMY      KIDNEY SURGERY      LUMBAR FUSION Bilateral 3/2/2022    Procedure: FUSION, SPINE, LUMBAR;  Surgeon: Guille Templeton MD;  Location: [address];  Service: Neurosurgery;  Laterality: Bilateral;  AIRO  T10--Pelvis    LUMBAR FUSION N/A 1/24/2023    Procedure: **AIRO** T8-T12 POSTERIOR FUSION, T8-T10 LAMINECTOMY, HARDWARE REMOVAL AND WASHOUT;  Surgeon: Guille Templeton MD;  Location: [address];  Service: Neurosurgery;  Laterality: N/A;    REMOVAL OF HARDWARE FROM SPINE N/A 2/21/2022    Procedure: REMOVAL, HARDWARE, SPINE;  Surgeon: Guille Templeton MD;  [address];  Service: Neurosurgery;  Laterality: N/A;  [place]    SPINE SURGERY      THORACIC LAMINECTOMY WITH FUSION N/A 2/2/2023    Procedure: LAMINECTOMY, SPINE, THORACIC, WITH FUSION (T4-Pelvis fusion w/ T9-10 corpectomies) **AIRO;  Surgeon: Guille Templeton MD;  Location: [facility] OR [address] [place] [address];  Service: Neurosurgery;  Laterality: N/A;  AIRO, 2 bovies, aquamantys, Globus, Depuy, antibiotic beads, TXA, Peroxide; Babycos plastics closure       Review of patient's allergies indicates:   Allergen Reactions    Bee venom protein (honey bee) Anaphylaxis     Patient reports she is allergic to bee stings.    Naproxen Anaphylaxis     Throat closing    12-23- Patient reports taking Ibuprofen 200 mg at home without problems. Verified X3. KS    Wasp sting [allergen ext-venom-honey bee] Anaphylaxis    Adhesive Blisters    Shellfish containing products     Iodine and iodide containing products Hives and Itching     Allergic to iodine  in seafood only    Nuts [tree nut] Rash       Current Facility-Administered Medications on File Prior to Encounter   Medication    [] 0.9%  NaCl infusion    [] 0.9%  NaCl infusion    [COMPLETED] 0.9%  NaCl infusion    [COMPLETED] ketorolac injection 15 mg    [DISCONTINUED] cefTRIAXone (ROCEPHIN) 1 g in dextrose 5 % in water (D5W) 5 % 100 mL IVPB (MB+)    [DISCONTINUED] gabapentin capsule 900 mg    [DISCONTINUED] gabapentin capsule 900 mg     Current Outpatient Medications on File Prior to Encounter   Medication Sig    acetaminophen (TYLENOL) 500 MG tablet Take 2 tablets (1,000 mg total) by mouth every 12 (twelve) hours.    albuterol (ACCUNEB) 1.25 mg/3 mL Nebu Take 3 mLs (1.25 mg total) by nebulization every 6 (six) hours as needed (shortness of breath). Rescue    ascorbic acid, vitamin C, (VITAMIN C) 500 MG tablet Take 1 tablet (500 mg total) by mouth 2 (two) times daily.    bumetanide (BUMEX) 1 MG tablet Take 1 tablet (1 mg total) by mouth once daily.    buPROPion (WELLBUTRIN XL) 150 MG TB24 tablet Take 1 tablet (150 mg total) by mouth once daily.    calcium carbonate (TUMS) 200 mg calcium (500 mg) chewable tablet Take 2 tablets (1,000 mg total) by mouth 3 (three) times daily as needed.    DULoxetine (CYMBALTA) 30 MG capsule Take 1 capsule (30 mg total) by mouth 2 (two) times daily.    folic acid (FOLVITE) 1 MG tablet Take 1 tablet (1 mg total) by mouth once daily. (Patient not taking: Reported on 2022)    gabapentin (NEURONTIN) 300 MG capsule Take 3 capsules (900 mg total) by mouth 3 (three) times daily.    hydrOXYzine HCL (ATARAX) 25 MG tablet Take 1 tablet (25 mg total) by mouth 3 (three) times daily as needed for Anxiety.    melatonin (MELATIN) 3 mg tablet Take 2 tablets (6 mg total) by mouth nightly as needed for Insomnia.    ondansetron 4 mg/2 mL Soln Inject 4 mg into the vein every 6 (six) hours as needed.    oxyCODONE (ROXICODONE) 10 mg Tab immediate release tablet Take 1 tablet (10 mg  total) by mouth every 3 (three) hours as needed.    oxyCODONE (ROXICODONE) 5 MG immediate release tablet Take 1 tablet (5 mg total) by mouth every 3 (three) hours as needed.    pantoprazole (PROTONIX) 40 MG tablet Take 1 tablet (40 mg total) by mouth once daily.    polyethylene glycol (GLYCOLAX) 17 gram PwPk Take 17 g by mouth daily as needed.    senna-docusate 8.6-50 mg (PERICOLACE) 8.6-50 mg per tablet Take 1 tablet by mouth 2 (two) times daily as needed for Constipation.    simethicone (MYLICON) 80 MG chewable tablet Take 1 tablet (80 mg total) by mouth 3 (three) times daily as needed for Flatulence.    sodium chloride 0.9% SolP 100 mL with meropenem 1 gram SolR 2 g Inject 2 g into the vein every 8 (eight) hours.    thiamine 100 MG tablet Take 1 tablet (100 mg total) by mouth once daily.    vitamin D (VITAMIN D3) 1000 units Tab Take 1 tablet (1,000 Units total) by mouth once daily.    zinc sulfate (ZINCATE) 50 mg zinc (220 mg) capsule Take 1 capsule (220 mg total) by mouth once daily.    [DISCONTINUED] diclofenac sodium (VOLTAREN) 1 % Gel Apply 2 g topically 4 (four) times daily.     Family History       Problem Relation (Age of Onset)    Cirrhosis Father    Drug abuse Mother, Father    Hepatitis Mother, Father    Liver cancer Mother          Tobacco Use    Smoking status: Some Days     Packs/day: 0.50     Years: 23.00     Pack years: 11.50     Types: Cigarettes    Smokeless tobacco: Never    Tobacco comments:     patient states she knows she nees to quit.   Substance and Sexual Activity    Alcohol use: Not Currently     Comment: quit 2014    Drug use: Yes     Frequency: 4.0 times per week     Types: Marijuana     Comment: Patient denies needle use, only inhalation of methampehtamines or orally.  Last known drug use was prior to admission to hospital stay for surgery    Sexual activity: Yes     Partners: Male     Birth control/protection: None       Objective:     Vital Signs (Most Recent):  Temp: 98.8 °F (37.1  °C) (06/13/23 0148)  Pulse: 110 (06/13/23 0148)  Resp: 20 (06/13/23 0148)  BP: (!) 149/58 (06/13/23 0148)  SpO2: 99 % (06/13/23 0148) Vital Signs (24h Range):  Temp:  [97.9 °F (36.6 °C)-98.8 °F (37.1 °C)] 98.8 °F (37.1 °C)  Pulse:  [104-138] 110  Resp:  [16-46] 20  SpO2:  [95 %-100 %] 99 %  BP: ()/(47-83) 149/58     Weight: 128.9 kg (284 lb 2.8 oz)  Body mass index is 44.51 kg/m².     Physical Exam  Vitals and nursing note reviewed.   Constitutional:       Appearance: Normal appearance.   Cardiovascular:      Rate and Rhythm: Regular rhythm. Tachycardia present.      Heart sounds: No murmur heard.  Pulmonary:      Effort: Pulmonary effort is normal. No respiratory distress.      Breath sounds: Normal breath sounds.   Abdominal:      Palpations: Abdomen is soft.      Tenderness: There is no guarding.   Musculoskeletal:         General: Swelling present.      Right lower leg: Edema present.      Left lower leg: Edema present.   Skin:     General: Skin is warm.      Findings: Bruising present.       Significant Labs: All pertinent labs within the past 24 hours have been reviewed.    Significant Imaging: I have reviewed all pertinent imaging results/findings within the past 24 hours.    Assessment/Plan:     * UTI (urinary tract infection)  - Monitor WBC count and Fever curve  - Follow up Blood cultures and Urine cultures  - CT AP: Left renal non-obstructing calculi; no evidence of Pyelonephritis  - Patient with ESBL in the past; started on Ceftriaxone would transition to Meropenem and consult ID for approval    Anxiety and depression  - Resume Home medication    Debility  - PT/OT consult    Chronic midline low back pain with sciatica  - Patient with chronic back pain   - Admitted   - Continue with pain control; if worsens consider NSGY evaluation and repeat imaging    Substance use disorder  - Urine tox: Positive for Amphetamine and Benzo  - Neuro checks q6h  - Fall/Seizure and Aspiration precauations  -  Counseling provided    Chronic hepatitis C with cirrhosis  - See Cirrhosis    Cirrhosis of liver with ascites  MELD-Na: 17 at 6/12/2023  6:21 AM  MELD: 15 at 6/12/2023  6:21 AM  Calculated from:  Serum Creatinine: 0.5 mg/dL (Using min of 1 mg/dL) at 6/12/2023  6:21 AM  Serum Sodium: 134 mmol/L at 6/12/2023  6:21 AM  Total Bilirubin: 2.7 mg/dL at 6/12/2023  6:21 AM  INR(ratio): 1.6 at 6/11/2023 12:09 PM  - Lactulose 20 mg, TID  - Start IV Lasix 40 daily and start Aldactone 100 mg, daily  - Consider GI/Hepatology follow up  - CT A/P:  Cirrhosis with splenomegaly and upper abdominal varices.  There is a right tips stent. small amount of abdominal ascites seen within the left lower quadrant of the abdomen. Anasarca      VTE Risk Mitigation (From admission, onward)           Ordered     enoxaparin injection 40 mg  Daily         06/13/23 0223     IP VTE HIGH RISK PATIENT  Once         06/13/23 0223     Place sequential compression device  Until discontinued         06/13/23 0145                    Carmel Manning MD  Department of Hospital Medicine  [facility] - Telemetry Stepdown

## 2023-06-13 NOTE — ASSESSMENT & PLAN NOTE
MELD-Na: 17 at 6/12/2023  6:21 AM  MELD: 15 at 6/12/2023  6:21 AM  Calculated from:  Serum Creatinine: 0.5 mg/dL (Using min of 1 mg/dL) at 6/12/2023  6:21 AM  Serum Sodium: 134 mmol/L at 6/12/2023  6:21 AM  Total Bilirubin: 2.7 mg/dL at 6/12/2023  6:21 AM  INR(ratio): 1.6 at 6/11/2023 12:09 PM  - Lactulose 20 mg, TID  - Start IV Lasix 40 daily and start Aldactone 100 mg, daily  - Consider GI/Hepatology follow up  - CT A/P:  Cirrhosis with splenomegaly and upper abdominal varices.  There is a right tips stent. small amount of abdominal ascites seen within the left lower quadrant of the abdomen. Anasarca

## 2023-06-13 NOTE — CONSULTS
[facility] - Telemetry Stepdown  Infectious Disease  Consult Note    Patient Name: Rachel Zazueta  MRN: [id number]  Admission Date: 6/13/2023  Hospital Length of Stay: 0 days  Attending Physician: Jessica Boyer MD  Primary Care Provider: Dannielle Merino DO     Isolation Status: No active isolations      Inpatient consult to Infectious Diseases  Consult performed by: CÉSAR Vences, ANP  Consult ordered by: aCrmel Manning MD  Reason for consult: ESBL in prior Urine cx         ID Consult acknowledged.   Full consult and recommendations to follow today  In the interim, please secure chat for any immediate concerns.     Thank you.   CÉSAR Lloyd, ANP-C

## 2023-06-13 NOTE — HPI
"Rachel Zazueta is a 42 year old with chronic HCV cirrhosis, history of IVDU, nephrolithiasis, pancytopenia, history of spinal fusion 4/2021, epidural abscess (GBS) with removal of hardware 2/2022), T10-pelvis fusion (3/2022), ESBL E coli Thoracic discitis s/p washout and hardware removal 1/24/2023 with thoracic laminectomy with T4-pelvis fusion 2/2/23 with muscle flap closures treated with 8 weeks of meropenem, obesity,neurogenic bladder, and recent admission to [facility] with cirrhosis and ascites, s/p TIPS 3/23, transferred from [facility] ED  after presenting with worsening lower extremity edema.  In ED tachycardic, hypotensive.  Afebrile, WBC 3.3.  U/A with pyuria, urine cx showing GNR, ID pending.  Blood cx NGTD.  CT A/P with non-obstructing stones, no hydronephrosis. ID consulted for "ESBL in prior urine cx".      Patient denies fevers, chills, sweats.  Complains of mild suprapubic pain with urination.  No n/v/d.   Reports lower extremity weakness, unable to ambulate, wheelchair bound. Can stand to transfer.  Reports primary reason she presented to [facility] was concern for skin/wounds.  Has had no home health, wound care.  Also complaining of low/mid back pain worsening from baseline pain over past week.  Does not appear to have had Neurosurgery follow up      "

## 2023-06-13 NOTE — NURSING
Nurses Note -- 4 Eyes      6/13/2023   2:46 AM      Skin assessed during: Admit      [] No Altered Skin Integrity Present    []Prevention Measures Documented      [x] Yes- Altered Skin Integrity Present or Discovered   [x] LDA Added if Not in Epic (Describe Wound)   [x] New Altered Skin Integrity was Present on Admit and Documented in LDA   [x] Wound Image Taken    Wound Care Consulted? Yes    Attending Nurse:  Diana Richard RN     Second RN/Staff Member:  Winifred Bazzi RN

## 2023-06-13 NOTE — ASSESSMENT & PLAN NOTE
- Urine tox: Positive for Amphetamine and Benzo  - Neuro checks q6h  - Fall/Seizure and Aspiration precauations  - Counseling provided

## 2023-06-13 NOTE — ASSESSMENT & PLAN NOTE
- Monitor WBC count and Fever curve  - Follow up Blood cultures and Urine cultures  - CT AP: Left renal non-obstructing calculi; no evidence of Pyelonephritis  - Patient with ESBL in the past; started on Ceftriaxone would transition to Meropenem and consult ID for approval

## 2023-06-13 NOTE — PLAN OF CARE
SW Attempted to see patient for assessment and discuss discharge plan ; however patient off the floor on three attempts. SW will follow-up.        Julissa Oswald LMSW  PRN - [facility]  [phone]

## 2023-06-13 NOTE — SUBJECTIVE & OBJECTIVE
Past Medical History:   Diagnosis Date    Anemia     Anxiety     Depressed     Diskitis     Hep C w/o coma, chronic     IV drug abuse     Kidney stone     Liver cirrhosis      Past Surgical History:   Procedure Laterality Date    ARTHROTOMY OF SHOULDER Left 11/15/2022    Procedure: ARTHROTOMY, SHOULDER;  Surgeon: Bjorn Hayes MD;  Location: [facility];  Service: Orthopedics;  Laterality: Left;  Left acromioclavicular joint arthrotomy    BACK SURGERY      benine tumor removal      forehead, age 9    CHOLECYSTECTOMY      KIDNEY SURGERY      LUMBAR FUSION Bilateral 3/2/2022    Procedure: FUSION, SPINE, LUMBAR;  Surgeon: Guille Templeton MD;  Location: [address];  Service: Neurosurgery;  Laterality: Bilateral;  AIRO  T10--Pelvis    LUMBAR FUSION N/A 1/24/2023    Procedure: **AIRO** T8-T12 POSTERIOR FUSION, T8-T10 LAMINECTOMY, HARDWARE REMOVAL AND WASHOUT;  Surgeon: Guille Templeton MD;  Location: [address];  Service: Neurosurgery;  Laterality: N/A;    REMOVAL OF HARDWARE FROM SPINE N/A 2/21/2022    Procedure: REMOVAL, HARDWARE, SPINE;  Surgeon: Guille Templeton MD;  Location: [address];  Service: Neurosurgery;  Laterality: N/A;  [place]    SPINE SURGERY      THORACIC LAMINECTOMY WITH FUSION N/A 2/2/2023    Procedure: LAMINECTOMY, SPINE, THORACIC, WITH FUSION (T4-Pelvis fusion w/ T9-10 corpectomies) **AIRO;  Surgeon: Guille Templeton MD;  Location: [address];  Service: Neurosurgery;  Laterality: N/A;  AIRO, 2 bovies, aquamantys, Globus, Depuy, antibiotic beads, TXA, Peroxide; Babycos plastics closure       Review of patient's allergies indicates:   Allergen Reactions    Bee venom protein (honey bee) Anaphylaxis     Patient reports she is allergic to bee stings.    Naproxen Anaphylaxis     Throat closing    12-23- Patient reports taking Ibuprofen 200 mg at home without problems. Verified X3. [place]    Wasp sting [allergen ext-venom-honey bee] Anaphylaxis    Adhesive Blisters    Shellfish containing products      Iodine and iodide containing products Hives and Itching     Allergic to iodine in seafood only    Nuts [tree nut] Rash       Current Facility-Administered Medications on File Prior to Encounter   Medication    [] 0.9%  NaCl infusion    [] 0.9%  NaCl infusion    [COMPLETED] 0.9%  NaCl infusion    [COMPLETED] ketorolac injection 15 mg    [DISCONTINUED] cefTRIAXone (ROCEPHIN) 1 g in dextrose 5 % in water (D5W) 5 % 100 mL IVPB (MB+)    [DISCONTINUED] gabapentin capsule 900 mg    [DISCONTINUED] gabapentin capsule 900 mg     Current Outpatient Medications on File Prior to Encounter   Medication Sig    acetaminophen (TYLENOL) 500 MG tablet Take 2 tablets (1,000 mg total) by mouth every 12 (twelve) hours.    albuterol (ACCUNEB) 1.25 mg/3 mL Nebu Take 3 mLs (1.25 mg total) by nebulization every 6 (six) hours as needed (shortness of breath). Rescue    ascorbic acid, vitamin C, (VITAMIN C) 500 MG tablet Take 1 tablet (500 mg total) by mouth 2 (two) times daily.    bumetanide (BUMEX) 1 MG tablet Take 1 tablet (1 mg total) by mouth once daily.    buPROPion (WELLBUTRIN XL) 150 MG TB24 tablet Take 1 tablet (150 mg total) by mouth once daily.    calcium carbonate (TUMS) 200 mg calcium (500 mg) chewable tablet Take 2 tablets (1,000 mg total) by mouth 3 (three) times daily as needed.    DULoxetine (CYMBALTA) 30 MG capsule Take 1 capsule (30 mg total) by mouth 2 (two) times daily.    folic acid (FOLVITE) 1 MG tablet Take 1 tablet (1 mg total) by mouth once daily. (Patient not taking: Reported on 2022)    gabapentin (NEURONTIN) 300 MG capsule Take 3 capsules (900 mg total) by mouth 3 (three) times daily.    hydrOXYzine HCL (ATARAX) 25 MG tablet Take 1 tablet (25 mg total) by mouth 3 (three) times daily as needed for Anxiety.    melatonin (MELATIN) 3 mg tablet Take 2 tablets (6 mg total) by mouth nightly as needed for Insomnia.    ondansetron 4 mg/2 mL Soln Inject 4 mg into the vein every 6 (six) hours as needed.     oxyCODONE (ROXICODONE) 10 mg Tab immediate release tablet Take 1 tablet (10 mg total) by mouth every 3 (three) hours as needed.    oxyCODONE (ROXICODONE) 5 MG immediate release tablet Take 1 tablet (5 mg total) by mouth every 3 (three) hours as needed.    pantoprazole (PROTONIX) 40 MG tablet Take 1 tablet (40 mg total) by mouth once daily.    polyethylene glycol (GLYCOLAX) 17 gram PwPk Take 17 g by mouth daily as needed.    senna-docusate 8.6-50 mg (PERICOLACE) 8.6-50 mg per tablet Take 1 tablet by mouth 2 (two) times daily as needed for Constipation.    simethicone (MYLICON) 80 MG chewable tablet Take 1 tablet (80 mg total) by mouth 3 (three) times daily as needed for Flatulence.    sodium chloride 0.9% SolP 100 mL with meropenem 1 gram SolR 2 g Inject 2 g into the vein every 8 (eight) hours.    thiamine 100 MG tablet Take 1 tablet (100 mg total) by mouth once daily.    vitamin D (VITAMIN D3) 1000 units Tab Take 1 tablet (1,000 Units total) by mouth once daily.    zinc sulfate (ZINCATE) 50 mg zinc (220 mg) capsule Take 1 capsule (220 mg total) by mouth once daily.    [DISCONTINUED] diclofenac sodium (VOLTAREN) 1 % Gel Apply 2 g topically 4 (four) times daily.     Family History       Problem Relation (Age of Onset)    Cirrhosis Father    Drug abuse Mother, Father    Hepatitis Mother, Father    Liver cancer Mother          Tobacco Use    Smoking status: Some Days     Packs/day: 0.50     Years: 23.00     Pack years: 11.50     Types: Cigarettes    Smokeless tobacco: Never    Tobacco comments:     patient states she knows she nees to quit.   Substance and Sexual Activity    Alcohol use: Not Currently     Comment: quit 2014    Drug use: Yes     Frequency: 4.0 times per week     Types: Marijuana     Comment: Patient denies needle use, only inhalation of methampehtamines or orally.  Last known drug use was prior to admission to hospital stay for surgery    Sexual activity: Yes     Partners: Male     Birth  control/protection: None       Objective:     Vital Signs (Most Recent):  Temp: 98.8 °F (37.1 °C) (06/13/23 0148)  Pulse: 110 (06/13/23 0148)  Resp: 20 (06/13/23 0148)  BP: (!) 149/58 (06/13/23 0148)  SpO2: 99 % (06/13/23 0148) Vital Signs (24h Range):  Temp:  [97.9 °F (36.6 °C)-98.8 °F (37.1 °C)] 98.8 °F (37.1 °C)  Pulse:  [104-138] 110  Resp:  [16-46] 20  SpO2:  [95 %-100 %] 99 %  BP: ()/(47-83) 149/58     Weight: 128.9 kg (284 lb 2.8 oz)  Body mass index is 44.51 kg/m².     Physical Exam  Vitals and nursing note reviewed.   Constitutional:       Appearance: Normal appearance.   Cardiovascular:      Rate and Rhythm: Regular rhythm. Tachycardia present.      Heart sounds: No murmur heard.  Pulmonary:      Effort: Pulmonary effort is normal. No respiratory distress.      Breath sounds: Normal breath sounds.   Abdominal:      Palpations: Abdomen is soft.      Tenderness: There is no guarding.   Musculoskeletal:         General: Swelling present.      Right lower leg: Edema present.      Left lower leg: Edema present.   Skin:     General: Skin is warm.      Findings: Bruising present.       Significant Labs: All pertinent labs within the past 24 hours have been reviewed.    Significant Imaging: I have reviewed all pertinent imaging results/findings within the past 24 hours.

## 2023-06-13 NOTE — PLAN OF CARE
Problem: Adult Inpatient Plan of Care  Goal: Plan of Care Review  Outcome: Ongoing, Progressing     Problem: Adult Inpatient Plan of Care  Goal: Optimal Comfort and Wellbeing  Outcome: Ongoing, Progressing   POC reviewed with patients. VSS. Denies any pain. Patient Aox4. Documented patients wounds on LDA. Denies any chest pain or SOB. No acute changes during shift. Bed alarm set and call light within reach.

## 2023-06-13 NOTE — ASSESSMENT & PLAN NOTE
- Patient with chronic back pain   - Admitted   - Continue with pain control; if worsens consider NSGY evaluation and repeat imaging

## 2023-06-13 NOTE — HPI
43-year-old female with a history of HCV, IV drug use, Cirrhosis, Nephrolithiasis, Spinal fusion in 2021, Epidural abscess s/p hardware removal in 2022, and neurogenic bladder presents as a transfer from [facility] ED after she presented on 06/11 with worsening lower extremity edema, swelling and pain. Patient is wheelchair-bound. In addition, she also complains of chronic back pain and skin breakdown in her sacrum and lower extremities. She reports having taken a muscle relaxant and Suboxone at home with no relief. She denies any fever, chills, nausea, vomiting, hematemesis, cough, chest pain, palpitations, shortness of breath, abdominal pain/distension, changes in bowel or urinary habits, no hematochezia or melena. In the ED,she was noted have borderline low blood pressure and tachycardic on arrival. She received 2L of IV luids and started on Ceftriaxone. Patient was than transferred for further care    Of note she was previously admitted here at [facility] on January 19-February 27 with ESBL E coli bacteremia and thoracic diskitis s/p thoracic fusion. She had significant ascites requiring several paracenteses and was then discharged to LT[facility]. The following month she presented to [facility] 3/13-4/1/2023 with worsening ascites and cirrhosis. She underwent TIPS on 3/23. At the time she had developed Anasarca and worsening pancytopenia.

## 2023-06-13 NOTE — CONSULTS
[facility] - Telemetry Stepdown  Wound Care    Patient Name:  Rachel Zazueta   MRN:  [id number]  Date: 6/13/2023  Diagnosis: UTI (urinary tract infection)    History:     Past Medical History:   Diagnosis Date    Anemia     Anxiety     Depressed     Diskitis     Hep C w/o coma, chronic     IV drug abuse     Kidney stone     Liver cirrhosis        Social History     Socioeconomic History    Marital status:    Tobacco Use    Smoking status: Some Days     Packs/day: 0.50     Years: 23.00     Pack years: 11.50     Types: Cigarettes    Smokeless tobacco: Never    Tobacco comments:     patient states she knows she nees to quit.   Substance and Sexual Activity    Alcohol use: Not Currently     Comment: quit 2014    Drug use: Yes     Frequency: 4.0 times per week     Types: Marijuana     Comment: Patient denies needle use, only inhalation of methampehtamines or orally.  Last known drug use was prior to admission to hospital stay for surgery    Sexual activity: Yes     Partners: Male     Birth control/protection: None     Social Determinants of Health     Financial Resource Strain: Medium Risk    Difficulty of Paying Living Expenses: Somewhat hard   Food Insecurity: No Food Insecurity    Worried About Running Out of Food in the Last Year: Never true    Ran Out of Food in the Last Year: Never true   Transportation Needs: Unmet Transportation Needs    Lack of Transportation (Medical): Yes    Lack of Transportation (Non-Medical): Yes   Physical Activity: Inactive    Days of Exercise per Week: 0 days    Minutes of Exercise per Session: 0 min   Stress: Stress Concern Present    Feeling of Stress : Very much   Social Connections: Unknown    Frequency of Communication with Friends and Family: More than three times a week    Frequency of Social Gatherings with Friends and Family: Never    Active Member of Clubs or Organizations: No    Attends Club or Organization Meetings: Never    Marital Status:    Housing  Stability: High Risk    Unable to Pay for Housing in the Last Year: No    Number of Places Lived in the Last Year: 1    Unstable Housing in the Last Year: Yes       Precautions:     Allergies as of 06/11/2023 - Reviewed 06/11/2023   Allergen Reaction Noted    Bee venom protein (honey bee) Anaphylaxis 03/20/2017    Naproxen Anaphylaxis 03/04/2015    Wasp sting [allergen ext-venom-honey bee] Anaphylaxis 03/20/2017    Adhesive Blisters 04/25/2021    Shellfish containing products  10/04/2018    Iodine and iodide containing products Hives and Itching 03/04/2015    Nuts [tree nut] Rash 03/04/2015       [facility] Assessment Details/Treatment     Patient seen for wound care consultation for multiple wounds.   Reviewed chart for this encounter.   See Flow Sheet for findings.    Pt found lying in bed, agreeable to care at this time. Pt states she has been less mobile following spinal surgery in February and has been primarily bed bound and wheelchair dependent. Pt believes wounds to her lower calf and heels are caused by banging her legs against her wheelchair frequently. L heel w/ small closed wound noted, R heel w/ large intact blood blister - painted w/ betadine, applied foam dressings. Pt has heel lift boots at bedside, educated pt on importance of wearing while in bed. To R anterior calf, pt has irregular ulceration w/ scant purulent drainage, cleansed w/ NS, applied Mepilex Ag to wound bed and secured w/ foam. Pt has skin tears noted to R lower posterior thigh and to R upper back - reapplied foam dressings. Foam dressings saturated in urine removed from BL posterior thighs and sacrum. Pt has partial thickness tissue loss noted to BL posterior thighs, suspect primary cause is moisture. Sacrum is intact - pink and blanchable, however, pt has small irregular area of partial thickness tissue loss noted to medial gluteal cleft. Applied Triad to gluteal cleft, BL posterior thighs. Educated pt on importance of frequent movement in  bed, taking breaks from purewick use. Waffle mattress, chair cushion ordered.    RECOMMENDATIONS:  BID/PRN - apply Triad to gluteal cleft, BL posterior thighs. Keep pt clean and dry, no diapers, allow pt breaks from purewick use.  Daily - paint R heel w/ betadine, apply foam dressing. Monitor for rupture.  Q5 days/PRN - cleanse skin tears w/ NS. Apply foam dressing.  Q3 days - R anterior calf - cleanse wound w/ NS, pat dry. Apply Mepilex Ag to wound bed (sticky side on skin) and secure w/ foam dressing.  Bedside RN to apply waffle mattress, chair cushion upon arrival to floor.  Q2 hr turns.    Discussed POC with patient and primary RN.   See EMR for orders & patient education.        Nursing to continue care.  Nursing to maintain pressure injury prevention interventions.           06/13/23 1222   WOCN Assessment   WOCN Total Time (mins) 25   Visit Date 06/13/23   Visit Time 1222   Consult Type New   WOCN Speciality Wound   Intervention assessed;changed;applied;chart review;coordination of care;orders   Teaching on-going        Altered Skin Integrity 06/13/23 0219 Left posterior Thigh #1 Skin Tear   Date First Assessed/Time First Assessed: 06/13/23 0219   Altered Skin Integrity Present on Admission - Did Patient arrive to the hospital with altered skin?: yes  Side: Left  Orientation: posterior  Location: Thigh  Wound Number: #1  Primary Wound Typ...   Wound Image    Dressing Appearance Saturated   Drainage Amount Scant   Drainage Characteristics/Odor Serosanguineous   Appearance Pink;Red;Moist   Tissue loss description Partial thickness   Periwound Area Moist;[place]   Wound Edges Irregular   Care Applied:;Skin Barrier        Altered Skin Integrity 06/13/23 0220 Right posterior Thoracic spine #1 Abrasion(s)   Date First Assessed/Time First Assessed: 06/13/23 0220   Altered Skin Integrity Present on Admission - Did Patient arrive to the hospital with altered skin?: yes  Side: Right  Orientation: posterior  Location:  Thoracic spine  Wound Number: #1  Primary...   Wound Image    Dressing Appearance Dry;Clean;Intact   Drainage Amount None   Drainage Characteristics/Odor No odor   Appearance Pink;Moist   Tissue loss description Partial thickness   Periwound Area Intact;Dry   Wound Edges Irregular   Dressing Changed;Foam        Altered Skin Integrity 06/13/23 0220 Right anterior;lower Leg #1 Skin Tear   Date First Assessed/Time First Assessed: 06/13/23 0220   Altered Skin Integrity Present on Admission - Did Patient arrive to the hospital with altered skin?: yes  Side: Right  Orientation: anterior;lower  Location: Leg  Wound Number: #1  Primary Wound...   Wound Image    Dressing Appearance Intact;Dry   Drainage Amount Scant   Drainage Characteristics/Odor Tan;[place]   Appearance Red;Pink;Moist   Tissue loss description Partial thickness   Periwound Area Intact;Dry   Wound Edges Irregular   Care Cleansed with:;Sterile normal saline   Dressing Applied;Silver;Foam   Dressing Change Due 06/15/23        Altered Skin Integrity 06/13/23 0222 Right plantar Heel #1 Other (comment)   Date First Assessed/Time First Assessed: 06/13/23 0222   Altered Skin Integrity Present on Admission - Did Patient arrive to the hospital with altered skin?: yes  Side: Right  Orientation: plantar  Location: Heel  Wound Number: #1  Primary Wound Type:...   Wound Image    Dressing Appearance Dry;Intact;Clean   Drainage Amount None   Drainage Characteristics/Odor No odor   Appearance Purple;Maroon;Dry;Intact   Tissue loss description Not applicable   Periwound Area Intact;Dry   Care Applied:;Povidone iodine   Dressing Applied;Foam        Altered Skin Integrity 06/13/23 1354 Gluteal cleft   Date First Assessed/Time First Assessed: 06/13/23 1354   Altered Skin Integrity Present on Admission - Did Patient arrive to the hospital with altered skin?: yes  Location: Gluteal cleft   Wound Image    Dressing Appearance Saturated   Drainage Amount None   Drainage  Characteristics/Odor No odor   Appearance Pink;Red;Moist   Tissue loss description Partial thickness   Periwound Area Intact;[place]   Wound Edges Irregular   Care Applied:;Skin Barrier        Altered Skin Integrity 06/13/23 1359 Right posterior Greater trochanter Moisture associated dermatitis   Date First Assessed/Time First Assessed: 06/13/23 1359   Altered Skin Integrity Present on Admission - Did Patient arrive to the hospital with altered skin?: yes  Side: Right  Orientation: posterior  Location: Greater trochanter  Primary Wound Type: M...   Wound Image    Dressing Appearance Saturated   Drainage Amount None   Drainage Characteristics/Odor No odor   Appearance Pink;Red;Moist   Tissue loss description Partial thickness   Periwound Area Intact;Moist   Wound Edges Irregular   Care Applied:;Skin Barrier

## 2023-06-14 LAB
ALBUMIN SERPL BCP-MCNC: 1.6 G/DL (ref 3.5–5.2)
ALP SERPL-CCNC: 131 U/L (ref 55–135)
ALT SERPL W/O P-5'-P-CCNC: 21 U/L (ref 10–44)
ANION GAP SERPL CALC-SCNC: 4 MMOL/L (ref 8–16)
AST SERPL-CCNC: 39 U/L (ref 10–40)
BASOPHILS # BLD AUTO: 0.02 K/UL (ref 0–0.2)
BASOPHILS NFR BLD: 0.8 % (ref 0–1.9)
BILIRUB SERPL-MCNC: 2.1 MG/DL (ref 0.1–1)
BUN SERPL-MCNC: 5 MG/DL (ref 6–20)
CALCIUM SERPL-MCNC: 7.6 MG/DL (ref 8.7–10.5)
CHLORIDE SERPL-SCNC: 102 MMOL/L (ref 95–110)
CO2 SERPL-SCNC: 29 MMOL/L (ref 23–29)
CREAT SERPL-MCNC: 0.5 MG/DL (ref 0.5–1.4)
DIFFERENTIAL METHOD: ABNORMAL
EOSINOPHIL # BLD AUTO: 0.1 K/UL (ref 0–0.5)
EOSINOPHIL NFR BLD: 2.9 % (ref 0–8)
ERYTHROCYTE [DISTWIDTH] IN BLOOD BY AUTOMATED COUNT: 18.6 % (ref 11.5–14.5)
EST. GFR  (NO RACE VARIABLE): >60 ML/MIN/1.73 M^2
GLUCOSE SERPL-MCNC: 90 MG/DL (ref 70–110)
HCT VFR BLD AUTO: 26.1 % (ref 37–48.5)
HGB BLD-MCNC: 8 G/DL (ref 12–16)
IMM GRANULOCYTES # BLD AUTO: 0.06 K/UL (ref 0–0.04)
IMM GRANULOCYTES NFR BLD AUTO: 2.5 % (ref 0–0.5)
INR PPP: 1.7 (ref 0.8–1.2)
LYMPHOCYTES # BLD AUTO: 0.5 K/UL (ref 1–4.8)
LYMPHOCYTES NFR BLD: 20.8 % (ref 18–48)
MCH RBC QN AUTO: 30.1 PG (ref 27–31)
MCHC RBC AUTO-ENTMCNC: 30.7 G/DL (ref 32–36)
MCV RBC AUTO: 98 FL (ref 82–98)
MONOCYTES # BLD AUTO: 0.3 K/UL (ref 0.3–1)
MONOCYTES NFR BLD: 13.8 % (ref 4–15)
NEUTROPHILS # BLD AUTO: 1.4 K/UL (ref 1.8–7.7)
NEUTROPHILS NFR BLD: 59.2 % (ref 38–73)
NRBC BLD-RTO: 0 /100 WBC
PLATELET # BLD AUTO: 86 K/UL (ref 150–450)
PMV BLD AUTO: 9.5 FL (ref 9.2–12.9)
POCT GLUCOSE: 120 MG/DL (ref 70–110)
POCT GLUCOSE: 121 MG/DL (ref 70–110)
POTASSIUM SERPL-SCNC: 3.7 MMOL/L (ref 3.5–5.1)
PROT SERPL-MCNC: 6.4 G/DL (ref 6–8.4)
PROTHROMBIN TIME: 17.3 SEC (ref 9–12.5)
RBC # BLD AUTO: 2.66 M/UL (ref 4–5.4)
SODIUM SERPL-SCNC: 135 MMOL/L (ref 136–145)
WBC # BLD AUTO: 2.4 K/UL (ref 3.9–12.7)

## 2023-06-14 PROCEDURE — 99233 PR SUBSEQUENT HOSPITAL CARE,LEVL III: ICD-10-PCS | Mod: ,,, | Performed by: STUDENT IN AN ORGANIZED HEALTH CARE EDUCATION/TRAINING PROGRAM

## 2023-06-14 PROCEDURE — 99233 SBSQ HOSP IP/OBS HIGH 50: CPT | Mod: ,,, | Performed by: STUDENT IN AN ORGANIZED HEALTH CARE EDUCATION/TRAINING PROGRAM

## 2023-06-14 PROCEDURE — 85025 COMPLETE CBC W/AUTO DIFF WBC: CPT | Performed by: STUDENT IN AN ORGANIZED HEALTH CARE EDUCATION/TRAINING PROGRAM

## 2023-06-14 PROCEDURE — 85610 PROTHROMBIN TIME: CPT | Performed by: STUDENT IN AN ORGANIZED HEALTH CARE EDUCATION/TRAINING PROGRAM

## 2023-06-14 PROCEDURE — 80053 COMPREHEN METABOLIC PANEL: CPT | Performed by: STUDENT IN AN ORGANIZED HEALTH CARE EDUCATION/TRAINING PROGRAM

## 2023-06-14 PROCEDURE — 36415 COLL VENOUS BLD VENIPUNCTURE: CPT | Performed by: STUDENT IN AN ORGANIZED HEALTH CARE EDUCATION/TRAINING PROGRAM

## 2023-06-14 PROCEDURE — 63600175 PHARM REV CODE 636 W HCPCS: Performed by: STUDENT IN AN ORGANIZED HEALTH CARE EDUCATION/TRAINING PROGRAM

## 2023-06-14 PROCEDURE — 97530 THERAPEUTIC ACTIVITIES: CPT

## 2023-06-14 PROCEDURE — 20600001 HC STEP DOWN PRIVATE ROOM

## 2023-06-14 PROCEDURE — 25000003 PHARM REV CODE 250: Performed by: STUDENT IN AN ORGANIZED HEALTH CARE EDUCATION/TRAINING PROGRAM

## 2023-06-14 PROCEDURE — 97161 PT EVAL LOW COMPLEX 20 MIN: CPT

## 2023-06-14 RX ORDER — SULFAMETHOXAZOLE AND TRIMETHOPRIM 800; 160 MG/1; MG/1
2 TABLET ORAL 2 TIMES DAILY
Status: DISCONTINUED | OUTPATIENT
Start: 2023-06-14 | End: 2023-06-16 | Stop reason: HOSPADM

## 2023-06-14 RX ADMIN — SULFAMETHOXAZOLE AND TRIMETHOPRIM 2 TABLET: 800; 160 TABLET ORAL at 02:06

## 2023-06-14 RX ADMIN — GABAPENTIN 900 MG: 300 CAPSULE ORAL at 02:06

## 2023-06-14 RX ADMIN — OXYCODONE HYDROCHLORIDE 10 MG: 10 TABLET ORAL at 06:06

## 2023-06-14 RX ADMIN — LACTULOSE 20 G: 20 SOLUTION ORAL at 08:06

## 2023-06-14 RX ADMIN — GABAPENTIN 900 MG: 300 CAPSULE ORAL at 09:06

## 2023-06-14 RX ADMIN — GABAPENTIN 900 MG: 300 CAPSULE ORAL at 08:06

## 2023-06-14 RX ADMIN — PANTOPRAZOLE SODIUM 40 MG: 40 TABLET, DELAYED RELEASE ORAL at 08:06

## 2023-06-14 RX ADMIN — BUPROPION HYDROCHLORIDE 150 MG: 150 TABLET, FILM COATED, EXTENDED RELEASE ORAL at 08:06

## 2023-06-14 RX ADMIN — LACTULOSE 20 G: 20 SOLUTION ORAL at 02:06

## 2023-06-14 RX ADMIN — DULOXETINE 30 MG: 30 CAPSULE, DELAYED RELEASE ORAL at 09:06

## 2023-06-14 RX ADMIN — ENOXAPARIN SODIUM 40 MG: 40 INJECTION SUBCUTANEOUS at 08:06

## 2023-06-14 RX ADMIN — SULFAMETHOXAZOLE AND TRIMETHOPRIM 2 TABLET: 800; 160 TABLET ORAL at 09:06

## 2023-06-14 RX ADMIN — ENOXAPARIN SODIUM 40 MG: 40 INJECTION SUBCUTANEOUS at 09:06

## 2023-06-14 RX ADMIN — MEROPENEM 2 G: 1 INJECTION INTRAVENOUS at 03:06

## 2023-06-14 RX ADMIN — ERTAPENEM 1 G: 1 INJECTION INTRAMUSCULAR; INTRAVENOUS at 11:06

## 2023-06-14 RX ADMIN — SPIRONOLACTONE 100 MG: 100 TABLET ORAL at 08:06

## 2023-06-14 RX ADMIN — ZINC SULFATE 220 MG (50 MG) CAPSULE 220 MG: CAPSULE at 08:06

## 2023-06-14 RX ADMIN — Medication 100 MG: at 08:06

## 2023-06-14 RX ADMIN — OXYCODONE HYDROCHLORIDE 10 MG: 10 TABLET ORAL at 03:06

## 2023-06-14 RX ADMIN — DULOXETINE 30 MG: 30 CAPSULE, DELAYED RELEASE ORAL at 08:06

## 2023-06-14 RX ADMIN — FUROSEMIDE 40 MG: 10 INJECTION, SOLUTION INTRAMUSCULAR; INTRAVENOUS at 05:06

## 2023-06-14 RX ADMIN — FUROSEMIDE 40 MG: 10 INJECTION, SOLUTION INTRAMUSCULAR; INTRAVENOUS at 08:06

## 2023-06-14 RX ADMIN — LACTULOSE 20 G: 20 SOLUTION ORAL at 09:06

## 2023-06-14 NOTE — SUBJECTIVE & OBJECTIVE
Past Medical History:   Diagnosis Date    Anemia     Anxiety     Depressed     Diskitis     Hep C w/o coma, chronic     IV drug abuse     Kidney stone     Liver cirrhosis        Past Surgical History:   Procedure Laterality Date    ARTHROTOMY OF SHOULDER Left 11/15/2022    Procedure: ARTHROTOMY, SHOULDER;  Surgeon: Bjorn Hayes MD;  Location: [facility];  Service: Orthopedics;  Laterality: Left;  Left acromioclavicular joint arthrotomy    BACK SURGERY      benine tumor removal      forehead, age 9    CHOLECYSTECTOMY      KIDNEY SURGERY      LUMBAR FUSION Bilateral 3/2/2022    Procedure: FUSION, SPINE, LUMBAR;  Surgeon: Guille Templeton MD;  Location: [address];  Service: Neurosurgery;  Laterality: Bilateral;  AIRO  T10--Pelvis    LUMBAR FUSION N/A 1/24/2023    Procedure: **AIRO** T8-T12 POSTERIOR FUSION, T8-T10 LAMINECTOMY, HARDWARE REMOVAL AND WASHOUT;  Surgeon: Guille Templeton MD;  Location: [address];  Service: Neurosurgery;  Laterality: N/A;    REMOVAL OF HARDWARE FROM SPINE N/A 2/21/2022    Procedure: REMOVAL, HARDWARE, SPINE;  Surgeon: Guille Templeton MD;  Location: [address];  Service: Neurosurgery;  Laterality: N/A;  [place]    SPINE SURGERY      THORACIC LAMINECTOMY WITH FUSION N/A 2/2/2023    Procedure: LAMINECTOMY, SPINE, THORACIC, WITH FUSION (T4-Pelvis fusion w/ T9-10 corpectomies) **AIRO;  Surgeon: Guille Templeton MD;  Location: [address];  Service: Neurosurgery;  Laterality: N/A;  AIRO, 2 bovies, aquamantys, Globus, Depuy, antibiotic beads, TXA, Peroxide; [employer] plastics closure       Review of patient's allergies indicates:   Allergen Reactions    Bee venom protein (honey bee) Anaphylaxis     Patient reports she is allergic to bee stings.    Naproxen Anaphylaxis     Throat closing    12-23- Patient reports taking Ibuprofen 200 mg at home without problems. Verified X3. [place]    Wasp sting [allergen ext-venom-honey bee] Anaphylaxis    Adhesive Blisters    Shellfish containing products      Iodine and iodide containing products Hives and Itching     Allergic to iodine in seafood only    Nuts [tree nut] Rash       Medications:  Medications Prior to Admission   Medication Sig    acetaminophen (TYLENOL) 500 MG tablet Take 2 tablets (1,000 mg total) by mouth every 12 (twelve) hours.    albuterol (ACCUNEB) 1.25 mg/3 mL Nebu Take 3 mLs (1.25 mg total) by nebulization every 6 (six) hours as needed (shortness of breath). Rescue    ascorbic acid, vitamin C, (VITAMIN C) 500 MG tablet Take 1 tablet (500 mg total) by mouth 2 (two) times daily.    bumetanide (BUMEX) 1 MG tablet Take 1 tablet (1 mg total) by mouth once daily.    buPROPion (WELLBUTRIN XL) 150 MG TB24 tablet Take 1 tablet (150 mg total) by mouth once daily.    calcium carbonate (TUMS) 200 mg calcium (500 mg) chewable tablet Take 2 tablets (1,000 mg total) by mouth 3 (three) times daily as needed.    DULoxetine (CYMBALTA) 30 MG capsule Take 1 capsule (30 mg total) by mouth 2 (two) times daily.    folic acid (FOLVITE) 1 MG tablet Take 1 tablet (1 mg total) by mouth once daily. (Patient not taking: Reported on 11/25/2022)    gabapentin (NEURONTIN) 300 MG capsule Take 3 capsules (900 mg total) by mouth 3 (three) times daily.    hydrOXYzine HCL (ATARAX) 25 MG tablet Take 1 tablet (25 mg total) by mouth 3 (three) times daily as needed for Anxiety.    melatonin (MELATIN) 3 mg tablet Take 2 tablets (6 mg total) by mouth nightly as needed for Insomnia.    ondansetron 4 mg/2 mL Soln Inject 4 mg into the vein every 6 (six) hours as needed.    oxyCODONE (ROXICODONE) 10 mg Tab immediate release tablet Take 1 tablet (10 mg total) by mouth every 3 (three) hours as needed.    oxyCODONE (ROXICODONE) 5 MG immediate release tablet Take 1 tablet (5 mg total) by mouth every 3 (three) hours as needed.    pantoprazole (PROTONIX) 40 MG tablet Take 1 tablet (40 mg total) by mouth once daily.    polyethylene glycol (GLYCOLAX) 17 gram PwPk Take 17 g by mouth daily as needed.     senna-docusate 8.6-50 mg (PERICOLACE) 8.6-50 mg per tablet Take 1 tablet by mouth 2 (two) times daily as needed for Constipation.    simethicone (MYLICON) 80 MG chewable tablet Take 1 tablet (80 mg total) by mouth 3 (three) times daily as needed for Flatulence.    sodium chloride 0.9% SolP 100 mL with meropenem 1 gram SolR 2 g Inject 2 g into the vein every 8 (eight) hours.    thiamine 100 MG tablet Take 1 tablet (100 mg total) by mouth once daily.    vitamin D (VITAMIN D3) 1000 units Tab Take 1 tablet (1,000 Units total) by mouth once daily.    zinc sulfate (ZINCATE) 50 mg zinc (220 mg) capsule Take 1 capsule (220 mg total) by mouth once daily.     Antibiotics (From admission, onward)      Start     Stop Route Frequency Ordered    06/13/23 2330  vancomycin (VANCOCIN) 1,000 mg in dextrose 5 % (D5W) 250 mL IVPB (Vial-Mate)         -- IV Every 12 hours (non-standard times) 06/13/23 1145    06/13/23 1243  vancomycin - pharmacy to dose  (vancomycin with pharmacy to dose consult)        See [facility] for full Linked Orders Report.    -- IV pharmacy to manage frequency 06/13/23 1144    06/13/23 1138  meropenem (MERREM) 2 g in sodium chloride 0.9% 100 mL IVPB         -- IV Every 8 hours (non-standard times) 06/13/23 0739          Antifungals (From admission, onward)      None          Antivirals (From admission, onward)      None             Immunization History   Administered Date(s) Administered    COVID-19, MRNA, LN-S, PF (Pfizer) (Purple Cap) 04/30/2021    Influenza - Quadrivalent - PF *Preferred* (6 months and older) 10/05/2018, 01/26/2022, 09/29/2022    Pneumococcal Conjugate - 13 Valent 10/05/2018    Tdap 08/19/2018       Family History       Problem Relation (Age of Onset)    Cirrhosis Father    Drug abuse Mother, Father    Hepatitis Mother, Father    Liver cancer Mother          Social History     Socioeconomic History    Marital status:    Tobacco Use    Smoking status: Some Days     Packs/day: 0.50      Years: 23.00     Pack years: 11.50     Types: Cigarettes    Smokeless tobacco: Never    Tobacco comments:     patient states she knows she nees to quit.   Substance and Sexual Activity    Alcohol use: Not Currently     Comment: quit 2014    Drug use: Yes     Frequency: 4.0 times per week     Types: Marijuana     Comment: Patient denies needle use, only inhalation of methampehtamines or orally.  Last known drug use was prior to admission to hospital stay for surgery    Sexual activity: Yes     Partners: Male     Birth control/protection: None     Social Determinants of Health     Financial Resource Strain: Medium Risk    Difficulty of Paying Living Expenses: Somewhat hard   Food Insecurity: No Food Insecurity    Worried About Running Out of Food in the Last Year: Never true    Ran Out of Food in the Last Year: Never true   Transportation Needs: Unmet Transportation Needs    Lack of Transportation (Medical): Yes    Lack of Transportation (Non-Medical): Yes   Physical Activity: Inactive    Days of Exercise per Week: 0 days    Minutes of Exercise per Session: 0 min   Stress: Stress Concern Present    Feeling of Stress : Very much   Social Connections: Unknown    Frequency of Communication with Friends and Family: More than three times a week    Frequency of Social Gatherings with Friends and Family: Never    Active Member of Clubs or Organizations: No    Attends Club or Organization Meetings: Never    Marital Status:    Housing Stability: High Risk    Unable to Pay for Housing in the Last Year: No    Number of Places Lived in the Last Year: 1    Unstable Housing in the Last Year: Yes     Review of Systems   Constitutional:  Positive for activity change. Negative for appetite change, chills, diaphoresis, fatigue and unexpected weight change.   HENT: Negative.     Eyes: Negative.    Respiratory:  Negative for cough and shortness of breath.    Cardiovascular:  Positive for leg swelling. Negative for chest  pain.   Gastrointestinal:  Negative for abdominal pain, constipation, diarrhea, nausea and vomiting.   Genitourinary:  Negative for difficulty urinating and flank pain.        Suprapubic discomfort with urination   Musculoskeletal:  Positive for back pain and gait problem. Negative for neck pain.   Skin:  Positive for wound.   Neurological:  Positive for weakness. Negative for dizziness and headaches.   Psychiatric/Behavioral:  Negative for agitation. The patient is not nervous/anxious.    Objective:     Vital Signs (Most Recent):  Temp: 98.5 °F (36.9 °C) (06/13/23 1618)  Pulse: (!) 113 (06/13/23 2052)  Resp: 16 (06/13/23 2052)  BP: (!) 109/57 (06/13/23 2052)  SpO2: 98 % (06/13/23 2052) Vital Signs (24h Range):  Temp:  [97 °F (36.1 °C)-98.8 °F (37.1 °C)] 98.5 °F (36.9 °C)  Pulse:  [109-116] 113  Resp:  [16-34] 16  SpO2:  [98 %-100 %] 98 %  BP: (108-149)/(54-74) 109/57     Weight: 128.9 kg (284 lb 2.8 oz)  Body mass index is 44.51 kg/m².    Estimated Creatinine Clearance: 204.8 mL/min (based on SCr of 0.5 mg/dL).     Physical Exam  Vitals and nursing note reviewed.   Constitutional:       General: She is not in acute distress.     Appearance: She is obese. She is ill-appearing (chronically ill appearing).   HENT:      Head: Normocephalic and atraumatic.      Mouth/Throat:      Mouth: Mucous membranes are moist.   Eyes:      General: No scleral icterus.     Conjunctiva/sclera: Conjunctivae normal.   Cardiovascular:      Rate and Rhythm: Regular rhythm. Tachycardia present.      Heart sounds: No murmur heard.  Pulmonary:      Effort: Pulmonary effort is normal. No respiratory distress.      Breath sounds: Normal breath sounds.   Abdominal:      General: There is no distension.      Palpations: Abdomen is soft.      Tenderness: There is abdominal tenderness (mild suprapubic TTP). There is no right CVA tenderness, left CVA tenderness or guarding.   Musculoskeletal:         General: Swelling present.      Cervical back:  Normal range of motion.      Right lower leg: Edema present.      Left lower leg: Edema present.      Comments: Chronic lower extremity skin changes.   Wound care photos reviewed. See Media tab.  No obvious evidence of active infection   Skin:     General: Skin is warm and dry.      Findings: No rash.   Neurological:      Mental Status: She is alert and oriented to person, place, and time.   Psychiatric:         Mood and Affect: Mood normal.         Behavior: Behavior normal.        Significant Labs: Blood Culture:   Recent Labs   Lab 01/10/23  2348 01/20/23  1131 01/27/23  0120 01/27/23  0123 06/11/23  1209   LABBLOO No growth after 5 days. No growth after 5 days.  No growth after 5 days. No growth after 5 days. No growth after 5 days. No Growth to date  No Growth to date  No Growth to date  No Growth to date  No Growth to date  No Growth to date     CBC:   Recent Labs   Lab 06/12/23  0621   WBC 3.37*   HGB 7.4*   HCT 24.1*   PLT 73*     CMP:   Recent Labs   Lab 06/12/23  0621 06/13/23  0554   * 134*   K 4.5 4.3    108   CO2 21* 21*   GLU 90 88   BUN 6 6   CREATININE 0.5 0.5   CALCIUM 7.7* 7.8*   PROT 6.2 6.2   ALBUMIN 1.7* 1.6*   BILITOT 2.7* 2.1*   ALKPHOS 113 119   AST 50* 50*   ALT 23 22   ANIONGAP 7* 5*     Microbiology Results (last 7 days)       ** No results found for the last 168 hours. **          Urine Culture:   Recent Labs   Lab 12/23/22  0501 01/20/23  0130 06/11/23  1305   LABURIN CANDIDA ALBICANS  50,000 - 99,999 cfu/ml  Treatment of asymptomatic candiduria is not recommended (except for   specific populations). Candida isolated in the urine typically   represents colonization. If an indwelling urinary catheter is present  it should be removed or replaced.  * Multiple organisms isolated. None in predominance.  Repeat if  clinically necessary. KLEBSIELLA PNEUMONIAE ESBL  >100,000 cfu/ml  *     Urine Studies:   Recent Labs   Lab 01/20/23  0130 06/11/23  1305   COLORU Yellow Yellow    APPEARANCEUA Clear Cloudy*   PHUR 7.0 6.0   SPECGRAV 1.025 >=1.030*   PROTEINUA Trace* 1+*   GLUCUA Negative Negative   KETONESU Negative Trace*   BILIRUBINUA Negative 3+*   OCCULTUA 2+* 3+*   NITRITE Negative Positive*   UROBILINOGEN  --  >=8.0*   LEUKOCYTESUR 2+* Trace*   RBCUA >100* 35*   WBCUA 76* 25*   BACTERIA Occasional Many*   SQUAMEPITHEL 6  --    HYALINECASTS  --  0       Significant Imaging: I have reviewed all pertinent imaging results/findings within the past 24 hours.

## 2023-06-14 NOTE — PT/OT/SLP EVAL
Physical Therapy Evaluation    Patient Name:  Rachel Zazueta   MRN:  [id number]  Admit Date: 6/13/2023  Admitting Diagnosis:  UTI (urinary tract infection)  Length of Stay: 1 days  Recent Surgery: * No surgery found *      Recommendations:     Discharge Recommendations:  nursing facility, skilled   Discharge Equipment Recommendations: to be determined by next level of care   Barriers to discharge: None    Highest Level of Mobility: bed mobility  Assistance Needed: maximum assistance    Assessment:     Rachel Zazueta is a 42 y.o. female admitted with a medical diagnosis of UTI (urinary tract infection). Medical history includes cirrhosis, IVDU, and multiple spinal services. She is most limited by pain. Today, she was able to perform bed mobility and sitting tolerance - mobility was ended due to increased back pain. Based on clinical presentation, co-morbidities, and today's performance, patient would benefit from acute skilled physical therapy services to improve functional mobility and return to max capacity prior level of function. See detailed evaluation below:    Problem List: weakness, impaired endurance, impaired sensation, impaired self care skills, impaired functional mobility, gait instability, impaired balance, decreased lower extremity function, pain, decreased ROM, impaired skin, edema  Rehab Prognosis: Good; patient would benefit from acute skilled PT services to address these deficits and reach maximum level of function.      Plan:     During this hospitalization, patient to be seen 3 x/week to address the identified rehab impairments via therapeutic activities, neuromuscular re-education, therapeutic exercises, wheelchair management/training and progress towards the established goals.    Plan of Care Expires:  07/14/23    Subjective   Communicated with RN prior to session.  Patient found HOB elevated upon PT entry to room, agreeable to evaluation.     Chief Complaint: Edema    Patient/Family  "Comments/goals: to get better  Pain/Comfort:  Pain Rating 1: 8/10  Location - Orientation 1: generalized  Location 1: back  Pain Addressed 1: Reposition, Distraction, Cessation of Activity  Pain Rating Post-Intervention 1: 10/10    Living Environment:  Patient lives with friends in a single story home with no steps to enter. She has been in and out of the hospital and rehab facilities since February. She reports being able to perform bed mobility and standing transfers without assistance.     Prior Level of Function:   Patient reports being modified independent with mobility & with ADLs. Patient owns DME as follows: cane, straight, bedside commode, wheelchair, rollator, walker, rolling.     Patient reports they will have assistance from friends (not consistent) upon discharge.    Objective:   Patient found with: telemetry, peripheral IV, PureWick, pressure relief boots     General Precautions: Standard, fall   Orthopedic Precautions:N/A   Braces: N/A   Oxygen Device: Room Air  Vitals: /68 (BP Location: Right arm, Patient Position: Lying)   Pulse 105   Temp 98 °F (36.7 °C) (Oral)   Resp 16   Ht 5' 7" (1.702 m)   Wt 128.9 kg (284 lb 2.8 oz)   LMP 06/09/2023 (Approximate)   SpO2 97%   Breastfeeding No   BMI 44.51 kg/m²     Exams:  Cognition:   Alert and Cooperative  Command following: Follows one-step commands  Fluency: clear/fluent  Hearing: Intact  Vision:  Intact visual fields  Skin Integrity: wounds + dry/cracked skin on lower legs  Sensation: impaired to bilateral legs  Coordination: intact  LE Strength:  L Lower Extremity: grossly 2+/5  R Lower Extremity: grossly 2+/5  LE ROM:  L Lower Extremity: ankle ROM limited  R Lower Extremity: ankle ROM limited      Outcome Measures:  AM-PAC 6 CLICK MOBILITY  Turning over in bed (including adjusting bedclothes, sheets and blankets)?: 2  Sitting down on and standing up from a chair with arms (e.g., wheelchair, bedside commode, etc.): 1  Moving from lying on " back to sitting on the side of the bed?: 2  Moving to and from a bed to a chair (including a wheelchair)?: 1  Need to walk in hospital room?: 1  Climbing 3-5 steps with a railing?: 1  Basic Mobility Total Score: 8     Functional Mobility:    Bed Mobility:  Supine to Sit: maximal assistance  Scooting anteriorly to EOB to have both feet planted on floor: maximal assistance  Sit to Supine: maximal assistance    Sitting Balance at Edge of Bed:  Assistance Level Required: Contact Guard Assistance  Postural deviations noted: rounded shoulders, forward head, posterior pelvic tilt, posterior lean      Therapeutic Exercises:   Patient educated on and demonstrated lower extremity strengthening exercises to perform throughout the day.      Education:   Patient was educated on the following:  Role of PT, plan of care, and goals  In room safety and use of call button  Importance of continued upright mobility and exercise  Balancing rest and activity      Patient left HOB elevated with all lines intact, call button in reach, and RN notified.    GOALS:   Multidisciplinary Problems       Physical Therapy Goals          Problem: Physical Therapy    Goal Priority Disciplines Outcome Goal Variances Interventions   Physical Therapy Goal     PT, PT/OT Ongoing, Progressing     Description: PT goals to be met by: 7/5/23    Patient will perform rolling each way with supervision.   Patient will perform supine <> sitting with supervision.  Patient will perform sit <> stand transitions with min A using RW.  Patient will perform transfers from bed <> chair or BSC with min A using RW.                       History:     Past Medical History:   Diagnosis Date    Anemia     Anxiety     Depressed     Diskitis     Hep C w/o coma, chronic     IV drug abuse     Kidney stone     Liver cirrhosis        Past Surgical History:   Procedure Laterality Date    ARTHROTOMY OF SHOULDER Left 11/15/2022    Procedure: ARTHROTOMY, SHOULDER;  Surgeon: Bjorn Hayes,  MD;  Location: [facility];  Service: Orthopedics;  Laterality: Left;  Left acromioclavicular joint arthrotomy    BACK SURGERY      benine tumor removal      forehead, age 9    CHOLECYSTECTOMY      KIDNEY SURGERY      LUMBAR FUSION Bilateral 3/2/2022    Procedure: FUSION, SPINE, LUMBAR;  Surgeon: Guille Templeton MD;  [facility];  Service: Neurosurgery;  Laterality: Bilateral;  AIRO  T10--Pelvis    LUMBAR FUSION N/A 1/24/2023    Procedure: **AIRO** T8-T12 POSTERIOR FUSION, T8-T10 LAMINECTOMY, HARDWARE REMOVAL AND WASHOUT;  Surgeon: Guille Templeton MD;  [facility];  Service: Neurosurgery;  Laterality: N/A;    REMOVAL OF HARDWARE FROM SPINE N/A 2/21/2022    Procedure: REMOVAL, HARDWARE, SPINE;  Surgeon: Guille Templeton MD;  [facility];  Service: Neurosurgery;  Laterality: N/A;  Washout    SPINE SURGERY      THORACIC LAMINECTOMY WITH FUSION N/A 2/2/2023    Procedure: LAMINECTOMY, SPINE, THORACIC, WITH FUSION (T4-Pelvis fusion w/ T9-10 corpectomies) **AIRO;  Surgeon: Guille Templeton MD;  [facility];  Service: Neurosurgery;  Laterality: N/A;  AIRO, 2 bovies, aquamantys, Globus, Depuy, antibiotic beads, TXA, Peroxide; [employer] plastics closure       Time Tracking:     PT Received On: 06/14/23  PT Start Time: 1004     PT Stop Time: 1020  PT Total Time (min): 16 min     Billable Minutes: Evaluation 8 and Therapeutic Activity 8    Mercedez Mustafa, PT, DPT  6/14/2023

## 2023-06-14 NOTE — ASSESSMENT & PLAN NOTE
MELD-Na: 17 at 6/14/2023  4:28 AM  MELD: 15 at 6/14/2023  4:28 AM  Calculated from:  Serum Creatinine: 0.5 mg/dL (Using min of 1 mg/dL) at 6/14/2023  4:28 AM  Serum Sodium: 135 mmol/L at 6/14/2023  4:28 AM  Total Bilirubin: 2.1 mg/dL at 6/14/2023  4:28 AM  INR(ratio): 1.7 at 6/14/2023  4:28 AM  Majority of mild common from elevated bilirubin and INR.  Patient's renal functions normal.  - Lactulose 20 mg, TID  - continue IV Lasix 40 b.i.d. and start Aldactone 100 mg, daily  - CT A/P:  Cirrhosis with splenomegaly and upper abdominal varices.  There is a right tips stent. small amount of abdominal ascites seen within the left lower quadrant of the abdomen. Anasarca

## 2023-06-14 NOTE — SUBJECTIVE & OBJECTIVE
Interval History:  No acute events overnight, patient endorsing persistent back pain the reported as slightly improved.  Reports that her right thigh pain has improved.  Urine culture growing ESBL Klebsiella. PureWick urine appearing clear    Review of Systems   Constitutional:  Negative for chills and fever.   HENT:  Negative for congestion and sore throat.    Eyes:  Negative for photophobia and visual disturbance.   Respiratory:  Negative for cough and shortness of breath.    Cardiovascular:  Positive for leg swelling. Negative for chest pain and palpitations.   Gastrointestinal:  Negative for abdominal pain, constipation, diarrhea, nausea and vomiting.   Endocrine: Negative for cold intolerance and heat intolerance.   Genitourinary:  Negative for dysuria and hematuria.   Musculoskeletal:  Positive for arthralgias and back pain. Negative for myalgias.   Skin:  Negative for rash.   Allergic/Immunologic: Negative for environmental allergies and food allergies.   Neurological:  Negative for dizziness, seizures, syncope and headaches.   Hematological:  Negative for adenopathy. Does not bruise/bleed easily.   Psychiatric/Behavioral:  Negative for hallucinations and suicidal ideas.    Objective:     Vital Signs (Most Recent):  Temp: 97.8 °F (36.6 °C) (06/14/23 1112)  Pulse: 107 (06/14/23 1144)  Resp: 18 (06/14/23 1112)  BP: (!) 107/53 (06/14/23 1112)  SpO2: 100 % (06/14/23 1112) Vital Signs (24h Range):  Temp:  [97.6 °F (36.4 °C)-98.6 °F (37 °C)] 97.8 °F (36.6 °C)  Pulse:  [105-115] 107  Resp:  [16-18] 18  SpO2:  [97 %-100 %] 100 %  BP: (104-122)/(53-68) 107/53     Weight: 128.9 kg (284 lb 2.8 oz)  Body mass index is 44.51 kg/m².    Intake/Output Summary (Last 24 hours) at 6/14/2023 1335  Last data filed at 6/14/2023 1045  Gross per 24 hour   Intake 240 ml   Output 3550 ml   Net -3310 ml         Physical Exam  Constitutional:       Appearance: She is well-developed. She is obese. She is ill-appearing.   HENT:      Head:  Normocephalic and atraumatic.   Eyes:      General: No scleral icterus.     Conjunctiva/sclera: Conjunctivae normal.      Pupils: Pupils are equal, round, and reactive to light.   Neck:      Thyroid: No thyromegaly.      Vascular: No JVD.   Cardiovascular:      Rate and Rhythm: Normal rate and regular rhythm.      Heart sounds: Normal heart sounds. No murmur heard.    No friction rub. No gallop.   Pulmonary:      Effort: Pulmonary effort is normal.      Breath sounds: Normal breath sounds. No wheezing or rales.   Abdominal:      General: Bowel sounds are normal. There is no distension.      Palpations: Abdomen is soft.      Tenderness: There is no abdominal tenderness. There is no guarding or rebound.   Musculoskeletal:         General: No tenderness. Normal range of motion.      Cervical back: Neck supple.      Right lower leg: Edema present.      Left lower leg: Edema present.   Skin:     General: Skin is warm and dry.      Coloration: Skin is not jaundiced.      Comments: Right thigh erythema improving, less indurated on 06/14.  Upon examination of patient's back, point tenderness appreciated along paraspinal muscles of thoracic spine, however no cellulitic changes appreciated.  Furthermore, no open ulcers appreciated.   Neurological:      Mental Status: She is alert and oriented to person, place, and time.      Cranial Nerves: No cranial nerve deficit.   Psychiatric:         Behavior: Behavior normal.           Significant Labs: All pertinent labs within the past 24 hours have been reviewed.  CBC:   Recent Labs   Lab 06/14/23  0428   WBC 2.40*   HGB 8.0*   HCT 26.1*   PLT 86*     CMP:   Recent Labs   Lab 06/13/23  0554 06/14/23  0428   * 135*   K 4.3 3.7    102   CO2 21* 29   GLU 88 90   BUN 6 5*   CREATININE 0.5 0.5   CALCIUM 7.8* 7.6*   PROT 6.2 6.4   ALBUMIN 1.6* 1.6*   BILITOT 2.1* 2.1*   ALKPHOS 119 131   AST 50* 39   ALT 22 21   ANIONGAP 5* 4*       Significant Imaging: I have reviewed all  pertinent imaging results/findings within the past 24 hours.

## 2023-06-14 NOTE — PLAN OF CARE
[facility] - Telemetry Stepdown  Discharge Assessment    Primary Care Provider: Dannielle Merino DO     Discharge Assessment (most recent)       BRIEF DISCHARGE ASSESSMENT - 06/14/23 7032          Discharge Planning    Assessment Type Discharge Planning Assessment     Resource/Environmental Concerns none     Support Systems Friends/neighbors;Family members;Other (Comment)   Teresa (friends that she lives with) [phone]- [address], [place] [address]    Assistance Needed TBD-Home Health     Equipment Currently Used at Home bedside commode;cane, straight;wheelchair;commode;rollator;walker, rolling     Current Living Arrangements home     Care Facility Name N/A     Patient/Family Anticipates Transition to home with family     Patient/Family Anticipated Services at Transition none     DME Needed Upon Discharge  none     Discharge Plan A Home with family;Home Health     Discharge Plan B Home with family;Home Health                     SW spoke with patient in 3106 for Discharge Planning Assessment. Per patient, Pt lives friends in a single family home on a slab foundation with zero steps to porch and point of entry.  Patient was dependent with ADLS and DID  use DME or in-home assistive equipment. Pt is not on dialysis  or coumadin,  takes medications as prescribed / keeps refilled / has resources for all daily and prescriptive needs. Preferred pharmacy is [facility] - Agreeable to bedside delivery / Will have help from  Teresa (friends that she lives with) [phone]- [address], [place] [address] and other immediate family upon discharge - Pt will require transportation.  All questions addressed. Unit and SW direct numbers provided. Will continue to follow for course of hospitalization    6/13/2023  1:07 AM  Cirrhosis [K74.60]    PCP: Dannielle Merino DO    PHARMACY:   [facility] [place], [place] - [address]  [address]  [facility] [address]  Phone:  [phone] Fax: [phone]    [id number] - [place], [place] - [address]  [address]  [facility] [address]  Phone: [phone] Fax: [phone]      Payor: MEDICAID / Plan: [facility] / Product Type: Managed Medicaid /       Julissa Oswald LMSW  PRN - [facility]  [id number]

## 2023-06-14 NOTE — PLAN OF CARE
Problem: Physical Therapy  Goal: Physical Therapy Goal  Description: PT goals to be met by: 7/5/23    Patient will perform rolling each way with supervision.   Patient will perform supine <> sitting with supervision.  Patient will perform sit <> stand transitions with min A using RW.  Patient will perform transfers from bed <> chair or BSC with min A using RW.  Outcome: Ongoing, Progressing

## 2023-06-14 NOTE — ASSESSMENT & PLAN NOTE
Patient growing ESBL Klebsiella. Urine that has been collected no longer cloudy. CT AP: Left renal non-obstructing calculi; no evidence of Pyelonephritis  - ID consult given near funny is recommended switching the ertapenem.  Will treat for 3 days.

## 2023-06-14 NOTE — PROGRESS NOTES
Therapy with vancomycin complete and/or consult discontinued by provider. Pharmacy will sign off, please re-consult as needed.    Elis Price, PharmD, BCPS  [id number]

## 2023-06-14 NOTE — PLAN OF CARE
Problem: Adult Inpatient Plan of Care  Goal: Plan of Care Review  Outcome: Ongoing, Progressing  Goal: Patient-Specific Goal (Individualized)  Outcome: Ongoing, Progressing  Goal: Absence of Hospital-Acquired Illness or Injury  Outcome: Ongoing, Progressing  Goal: Optimal Comfort and Wellbeing  Outcome: Ongoing, Progressing  Goal: Readiness for Transition of Care  Outcome: Ongoing, Progressing     Problem: Bariatric Environmental Safety  Goal: Safety Maintained with Care  Outcome: Ongoing, Progressing     Problem: Infection  Goal: Absence of Infection Signs and Symptoms  Outcome: Ongoing, Progressing     Problem: Skin Injury Risk Increased  Goal: Skin Health and Integrity  Outcome: Ongoing, Progressing     Problem: Impaired Wound Healing  Goal: Optimal Wound Healing  Outcome: Ongoing, Progressing

## 2023-06-14 NOTE — CONSULTS
"[facility] - Telemetry Stepdown  Infectious Disease  Consult Note    Patient Name: Rachel Zazueta  MRN: [id number]  Admission Date: 6/13/2023  Hospital Length of Stay: 0 days  Attending Physician: Jessica Boyer MD  Primary Care Provider: Dannielle Merino DO     Isolation Status: No active isolations    Patient information was obtained from patient, past medical records and ER records.      Consults  Assessment/Plan:     Renal/  * UTI (urinary tract infection)     42 year old with chronic HCV cirrhosis, history of IVDU, nephrolithiasis, pancytopenia, history of spinal fusion 4/2021, epidural abscess (GBS) with removal of hardware 2/2022), T10-pelvis fusion (3/2022), ESBL E coli Thoracic discitis s/p washout and hardware removal 1/24/2023 with thoracic laminectomy with T4-pelvis fusion 2/2/23 with muscle flap closures treated with 8 weeks of meropenem, obesity,neurogenic bladder, recent admission to [facility] with cirrhosis and ascites, s/p TIPS 3/23, transferred from [facility] ED  after presenting with worsening lower extremity edema.  In ED tachycardic, hypotensive.  Afebrile, WBC 3.3.  U/A with pyuria.   Blood cx NGTD.  CT A/P in ED with non-obstructing stones, no hydronephrosis. ID consulted for "ESBL in prior urine cx".  Patient started on IV meropenem.     Urine cx + for ESBL Kleb PNA - ertapenem S.   Patient does endorse suprapubic pain with urination.  Afebrile.  Denies chills/sweats. No flank pain. No n/v/d.        Also complaining of low/mid back pain worsening from baseline pain over past week.  Does not appear to have had Neurosurgery follow up since last surgery.  Concerned as well with skins tear/wounds to buttock, thighs, heels    Plan/recommendations:  1. Recommend discontinuing IV meropenem and starting ertapenem 1 g IV q 8 hours X 3 days for uncomplicated UTI.   2. Monitor for worsening back pain from baseline.  Consider Neurosurgery consult/follow up and spinal imaging given prior complex " "surgical/infectious history  3. Continue regular wound/skin care per Wound Care recommendations.    4. Remaining care per Primary Team.    5. Will sign off.  Please call with questions or re-consult as needed    Data reviewed and plan discussed with ID staff, Dr. Hayes  Secure chat/Discussed above plan with Primary Team, Dr. Boyer        Thank you.   Please Secure Chat for any questions or concerns.  Kathleen Curry, APRN, ANP-C  Infectious Disease    Subjective:     Principal Problem: UTI (urinary tract infection)    HPI: Rachel Zazueta is a 42 year old with chronic HCV cirrhosis, history of IVDU, nephrolithiasis, pancytopenia, history of spinal fusion 4/2021, epidural abscess (GBS) with removal of hardware 2/2022), T10-pelvis fusion (3/2022), ESBL E coli Thoracic discitis s/p washout and hardware removal 1/24/2023 with thoracic laminectomy with T4-pelvis fusion 2/2/23 with muscle flap closures treated with 8 weeks of meropenem, obesity,neurogenic bladder, and recent admission to [facility] with cirrhosis and ascites, s/p TIPS 3/23, transferred from [facility] ED  after presenting with worsening lower extremity edema.  In ED tachycardic, hypotensive.  Afebrile, WBC 3.3.  U/A with pyuria, urine cx showing GNR, ID pending.  Blood cx NGTD.  CT A/P with non-obstructing stones, no hydronephrosis. ID consulted for "ESBL in prior urine cx".      Patient denies fevers, chills, sweats.  Complains of mild suprapubic pain with urination.  No n/v/d.   Reports lower extremity weakness, unable to ambulate, wheelchair bound. Can stand to transfer.  Reports primary reason she presented to [facility] was concern for skin/wounds.  Has had no home health, wound care.  Also complaining of low/mid back pain worsening from baseline pain over past week.  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9    CHOLECYSTECTOMY      KIDNEY SURGERY      LUMBAR FUSION Bilateral 3/2/2022    Procedure: FUSION, SPINE, LUMBAR;  Surgeon: Guille Templeton MD;  Location: [address];  Service: Neurosurgery;  Laterality: Bilateral;  AIRO  T10--Pelvis    LUMBAR FUSION N/A 1/24/2023    Procedure: **AIRO** T8-T12 POSTERIOR FUSION, T8-T10 LAMINECTOMY, HARDWARE REMOVAL AND WASHOUT;  Surgeon: Guille Templeton MD;  Location: [address];  Service: Neurosurgery;  Laterality: N/A;    REMOVAL OF HARDWARE FROM SPINE N/A 2/21/2022    Procedure: REMOVAL, HARDWARE, SPINE;  Surgeon: Guille Templeton MD;  Location: [address];  Service: Neurosurgery;  Laterality: N/A;  Washout    SPINE SURGERY      THORACIC LAMINECTOMY WITH FUSION N/A 2/2/2023    Procedure: LAMINECTOMY, SPINE, THORACIC, WITH FUSION (T4-Pelvis fusion w/ T9-10 corpectomies) **AIRO;  Surgeon: Guille Templeton MD;  Location: [facility] OR [address] [place] [address];  Service: Neurosurgery;  Laterality: N/A;  AIRO, 2 bovies, aquamantys, Globus, Depuy, antibiotic beads, TXA, Peroxide; [employer] plastics closure       Review of patient's allergies indicates:   Allergen Reactions    Bee venom protein (honey bee) Anaphylaxis     Patient reports she is allergic to bee stings.    Naproxen Anaphylaxis     Throat closing    12-23- Patient reports taking Ibuprofen 200 mg at home without problems. Verified X3. [place]    Wasp sting [allergen ext-venom-honey bee] Anaphylaxis    Adhesive Blisters    Shellfish containing products     Iodine and iodide containing products Hives and Itching     Allergic to iodine in seafood only     Nuts [tree nut] Rash       Medications:  Medications Prior to Admission   Medication Sig    acetaminophen (TYLENOL) 500 MG tablet Take 2 tablets (1,000 mg total) by mouth every 12 (twelve) hours.    albuterol (ACCUNEB) 1.25 mg/3 mL Nebu Take 3 mLs (1.25 mg total) by nebulization every 6 (six) hours as needed (shortness of breath). Rescue    ascorbic acid, vitamin C, (VITAMIN C) 500 MG tablet Take 1 tablet (500 mg total) by mouth 2 (two) times daily.    bumetanide (BUMEX) 1 MG tablet Take 1 tablet (1 mg total) by mouth once daily.    buPROPion (WELLBUTRIN XL) 150 MG TB24 tablet Take 1 tablet (150 mg total) by mouth once daily.    calcium carbonate (TUMS) 200 mg calcium (500 mg) chewable tablet Take 2 tablets (1,000 mg total) by mouth 3 (three) times daily as needed.    DULoxetine (CYMBALTA) 30 MG capsule Take 1 capsule (30 mg total) by mouth 2 (two) times daily.    folic acid (FOLVITE) 1 MG tablet Take 1 tablet (1 mg total) by mouth once daily. (Patient not taking: Reported on 11/25/2022)    gabapentin (NEURONTIN) 300 MG capsule Take 3 capsules (900 mg total) by mouth 3 (three) times daily.    hydrOXYzine HCL (ATARAX) 25 MG tablet Take 1 tablet (25 mg total) by mouth 3 (three) times daily as needed for Anxiety.    melatonin (MELATIN) 3 mg tablet Take 2 tablets (6 mg total) by mouth nightly as needed for Insomnia.    ondansetron 4 mg/2 mL Soln Inject 4 mg into the vein every 6 (six) hours as needed.    oxyCODONE (ROXICODONE) 10 mg Tab immediate release tablet Take 1 tablet (10 mg total) by mouth every 3 (three) hours as needed.    oxyCODONE (ROXICODONE) 5 MG immediate release tablet Take 1 tablet (5 mg total) by mouth every 3 (three) hours as needed.    pantoprazole (PROTONIX) 40 MG tablet Take 1 tablet (40 mg total) by mouth once daily.    polyethylene glycol (GLYCOLAX) 17 gram PwPk Take 17 g by mouth daily as needed.    senna-docusate 8.6-50 mg (PERICOLACE) 8.6-50 mg per tablet Take 1 tablet by  mouth 2 (two) times daily as needed for Constipation.    simethicone (MYLICON) 80 MG chewable tablet Take 1 tablet (80 mg total) by mouth 3 (three) times daily as needed for Flatulence.    sodium chloride 0.9% SolP 100 mL with meropenem 1 gram SolR 2 g Inject 2 g into the vein every 8 (eight) hours.    thiamine 100 MG tablet Take 1 tablet (100 mg total) by mouth once daily.    vitamin D (VITAMIN D3) 1000 units Tab Take 1 tablet (1,000 Units total) by mouth once daily.    zinc sulfate (ZINCATE) 50 mg zinc (220 mg) capsule Take 1 capsule (220 mg total) by mouth once daily.     Antibiotics (From admission, onward)      Start     Stop Route Frequency Ordered    06/13/23 2330  vancomycin (VANCOCIN) 1,000 mg in dextrose 5 % (D5W) 250 mL IVPB (Vial-Mate)         -- IV Every 12 hours (non-standard times) 06/13/23 1145    06/13/23 1243  vancomycin - pharmacy to dose  (vancomycin with pharmacy to dose consult)        See Hyperspace for full Linked Orders Report.    -- IV pharmacy to manage frequency 06/13/23 1144    06/13/23 1138  meropenem (MERREM) 2 g in sodium chloride 0.9% 100 mL IVPB         -- IV Every 8 hours (non-standard times) 06/13/23 0739          Antifungals (From admission, onward)      None          Antivirals (From admission, onward)      None             Immunization History   Administered Date(s) Administered    COVID-19, MRNA, LN-S, PF (Pfizer) (Purple Cap) 04/30/2021    Influenza - Quadrivalent - PF *Preferred* (6 months and older) 10/05/2018, 01/26/2022, 09/29/2022    Pneumococcal Conjugate - 13 Valent 10/05/2018    Tdap 08/19/2018       Family History       Problem Relation (Age of Onset)    Cirrhosis Father    Drug abuse Mother, Father    Hepatitis Mother, Father    Liver cancer Mother          Social History     Socioeconomic History    Marital status:    Tobacco Use    Smoking status: Some Days     Packs/day: 0.50     Years: 23.00     Pack years: 11.50     Types: Cigarettes     Smokeless tobacco: Never    Tobacco comments:     patient states she knows she nees to quit.   Substance and Sexual Activity    Alcohol use: Not Currently     Comment: quit 2014    Drug use: Yes     Frequency: 4.0 times per week     Types: Marijuana     Comment: Patient denies needle use, only inhalation of methampehtamines or orally.  Last known drug use was prior to admission to hospital stay for surgery    Sexual activity: Yes     Partners: Male     Birth control/protection: None     Social Determinants of Health     Financial Resource Strain: Medium Risk    Difficulty of Paying Living Expenses: Somewhat hard   Food Insecurity: No Food Insecurity    Worried About Running Out of Food in the Last Year: Never true    Ran Out of Food in the Last Year: Never true   Transportation Needs: Unmet Transportation Needs    Lack of Transportation (Medical): Yes    Lack of Transportation (Non-Medical): Yes   Physical Activity: Inactive    Days of Exercise per Week: 0 days    Minutes of Exercise per Session: 0 min   Stress: Stress Concern Present    Feeling of Stress : Very much   Social Connections: Unknown    Frequency of Communication with Friends and Family: More than three times a week    Frequency of Social Gatherings with Friends and Family: Never    Active Member of Clubs or Organizations: No    Attends Club or Organization Meetings: Never    Marital Status:    Housing Stability: High Risk    Unable to Pay for Housing in the Last Year: No    Number of Places Lived in the Last Year: 1    Unstable Housing in the Last Year: Yes     Review of Systems   Constitutional:  Positive for activity change. Negative for appetite change, chills, diaphoresis, fatigue and unexpected weight change.   HENT: Negative.     Eyes: Negative.    Respiratory:  Negative for cough and shortness of breath.    Cardiovascular:  Positive for leg swelling. Negative for chest pain.   Gastrointestinal:  Negative for  abdominal pain, constipation, diarrhea, nausea and vomiting.   Genitourinary:  Negative for difficulty urinating and flank pain.        Suprapubic discomfort with urination   Musculoskeletal:  Positive for back pain and gait problem. Negative for neck pain.   Skin:  Positive for wound.   Neurological:  Positive for weakness. Negative for dizziness and headaches.   Psychiatric/Behavioral:  Negative for agitation. The patient is not nervous/anxious.    Objective:     Vital Signs (Most Recent):  Temp: 98.5 °F (36.9 °C) (06/13/23 1618)  Pulse: (!) 113 (06/13/23 2052)  Resp: 16 (06/13/23 2052)  BP: (!) 109/57 (06/13/23 2052)  SpO2: 98 % (06/13/23 2052) Vital Signs (24h Range):  Temp:  [97 °F (36.1 °C)-98.8 °F (37.1 °C)] 98.5 °F (36.9 °C)  Pulse:  [109-116] 113  Resp:  [16-34] 16  SpO2:  [98 %-100 %] 98 %  BP: (108-149)/(54-74) 109/57     Weight: 128.9 kg (284 lb 2.8 oz)  Body mass index is 44.51 kg/m².    Estimated Creatinine Clearance: 204.8 mL/min (based on SCr of 0.5 mg/dL).     Physical Exam  Vitals and nursing note reviewed.   Constitutional:       General: She is not in acute distress.     Appearance: She is obese. She is ill-appearing (chronically ill appearing).   HENT:      Head: Normocephalic and atraumatic.      Mouth/Throat:      Mouth: Mucous membranes are moist.   Eyes:      General: No scleral icterus.     Conjunctiva/sclera: Conjunctivae normal.   Cardiovascular:      Rate and Rhythm: Regular rhythm. Tachycardia present.      Heart sounds: No murmur heard.  Pulmonary:      Effort: Pulmonary effort is normal. No respiratory distress.      Breath sounds: Normal breath sounds.   Abdominal:      General: There is no distension.      Palpations: Abdomen is soft.      Tenderness: There is abdominal tenderness (mild suprapubic TTP). There is no right CVA tenderness, left CVA tenderness or guarding.   Musculoskeletal:         General: Swelling present.      Cervical back: Normal range of motion.      Right lower  leg: Edema present.      Left lower leg: Edema present.      Comments: Chronic lower extremity skin changes.   Wound care photos reviewed. See Media tab.  No obvious evidence of active infection   Skin:     General: Skin is warm and dry.      Findings: No rash.   Neurological:      Mental Status: She is alert and oriented to person, place, and time.   Psychiatric:         Mood and Affect: Mood normal.         Behavior: Behavior normal.        Significant Labs: Blood Culture:   Recent Labs   Lab 01/10/23  2348 01/20/23  1131 01/27/23  0120 01/27/23  0123 06/11/23  1209   LABBLOO No growth after 5 days. No growth after 5 days.  No growth after 5 days. No growth after 5 days. No growth after 5 days. No Growth to date  No Growth to date  No Growth to date  No Growth to date  No Growth to date  No Growth to date     CBC:   Recent Labs   Lab 06/12/23  0621   WBC 3.37*   HGB 7.4*   HCT 24.1*   PLT 73*     CMP:   Recent Labs   Lab 06/12/23  0621 06/13/23  0554   * 134*   K 4.5 4.3    108   CO2 21* 21*   GLU 90 88   BUN 6 6   CREATININE 0.5 0.5   CALCIUM 7.7* 7.8*   PROT 6.2 6.2   ALBUMIN 1.7* 1.6*   BILITOT 2.7* 2.1*   ALKPHOS 113 119   AST 50* 50*   ALT 23 22   ANIONGAP 7* 5*     Microbiology Results (last 7 days)       ** No results found for the last 168 hours. **          Urine Culture:   Recent Labs   Lab 12/23/22  0501 01/20/23  0130 06/11/23  1305   LABURIN CANDIDA ALBICANS  50,000 - 99,999 cfu/ml  Treatment of asymptomatic candiduria is not recommended (except for   specific populations). Candida isolated in the urine typically   represents colonization. If an indwelling urinary catheter is present  it should be removed or replaced.  * Multiple organisms isolated. None in predominance.  Repeat if  clinically necessary. KLEBSIELLA PNEUMONIAE ESBL  >100,000 cfu/ml  *     Urine Studies:   Recent Labs   Lab 01/20/23  0130 06/11/23  1305   COLORU Yellow Yellow   APPEARANCEUA Clear Cloudy*   PHUR 7.0  6.0   SPECGRAV 1.025 >=1.030*   PROTEINUA Trace* 1+*   GLUCUA Negative Negative   KETONESU Negative Trace*   BILIRUBINUA Negative 3+*   OCCULTUA 2+* 3+*   NITRITE Negative Positive*   UROBILINOGEN  --  >=8.0*   LEUKOCYTESUR 2+* Trace*   RBCUA >100* 35*   WBCUA 76* 25*   BACTERIA Occasional Many*   SQUAMEPITHEL 6  --    HYALINECASTS  --  0       Significant Imaging: I have reviewed all pertinent imaging results/findings within the past 24 hours.

## 2023-06-14 NOTE — ASSESSMENT & PLAN NOTE
- See Cirrhosis  - patient has not received any treatment yet  - hcv rna positive as of February 2023

## 2023-06-14 NOTE — ASSESSMENT & PLAN NOTE
Urine tox: Positive for Amphetamine and Benzo  - Neuro checks q6h  - Fall/Seizure and Aspiration precauations

## 2023-06-14 NOTE — ASSESSMENT & PLAN NOTE
"   42 year old with chronic HCV cirrhosis, history of IVDU, nephrolithiasis, pancytopenia, history of spinal fusion 4/2021, epidural abscess (GBS) with removal of hardware 2/2022), T10-pelvis fusion (3/2022), ESBL E coli Thoracic discitis s/p washout and hardware removal 1/24/2023 with thoracic laminectomy with T4-pelvis fusion 2/2/23 with muscle flap closures treated with 8 weeks of meropenem, obesity,neurogenic bladder, recent admission to [facility] with cirrhosis and ascites, s/p TIPS 3/23, transferred from [facility] ED  after presenting with worsening lower extremity edema.  In ED tachycardic, hypotensive.  Afebrile, WBC 3.3.  U/A with pyuria.   Blood cx NGTD.  CT A/P in ED with non-obstructing stones, no hydronephrosis. ID consulted for "ESBL in prior urine cx".  Patient started on IV meropenem.     Urine cx + for ESBL Kleb PNA - ertapenem S.   Patient does endorse suprapubic pain with urination.  Afebrile.  Denies chills/sweats. No flank pain. No n/v/d.        Also complaining of low/mid back pain worsening from baseline pain over past week.  Does not appear to have had Neurosurgery follow up since last surgery.  Concerned as well with skins tear/wounds to buttock, thighs, heels    Plan/recommendations:  1. Recommend discontinuing IV meropenem and starting ertapenem 1 g IV q 8 hours X 3 days for uncomplicated UTI.   2. Monitor for worsening back pain from baseline.  Consider Neurosurgery consult/follow up and spinal imaging given prior complex surgical/infectious history  3. Continue regular wound/skin care per Wound Care recommendations.    4. Remaining care per Primary Team.    5. Will sign off.  Please call with questions or re-consult as needed    Data reviewed and plan discussed with ID staff, Dr. Hayes  Secure chat/Discussed above plan with Primary Team, Dr. Boyer  "

## 2023-06-14 NOTE — HOSPITAL COURSE
Patient diuresing well since being placed on Lasix b.i.d., infectious Disease met with patient and switch patient to ertapenem for meropenem.  Urine culture growing ESBL Klebsiella

## 2023-06-14 NOTE — ASSESSMENT & PLAN NOTE
Patient with chronic back pain.  Reports worsening with accumulation of her anasarca.  Tried taking her home Suboxone which she said was left over from her previous attempts at treatment.  Suboxone did not help.  She states that she ran out of her opiates issues prescribed after she is discharged from [facility]  - no ulcerations or purulent skin changes noted to her back on exam  - Continue with pain control; if worsens consider NSGY evaluation and repeat imaging

## 2023-06-15 LAB
ALBUMIN SERPL BCP-MCNC: 1.6 G/DL (ref 3.5–5.2)
ALP SERPL-CCNC: 141 U/L (ref 55–135)
ALT SERPL W/O P-5'-P-CCNC: 19 U/L (ref 10–44)
ANION GAP SERPL CALC-SCNC: 4 MMOL/L (ref 8–16)
AST SERPL-CCNC: 39 U/L (ref 10–40)
BASOPHILS # BLD AUTO: 0.02 K/UL (ref 0–0.2)
BASOPHILS NFR BLD: 0.7 % (ref 0–1.9)
BILIRUB SERPL-MCNC: 2.4 MG/DL (ref 0.1–1)
BUN SERPL-MCNC: 7 MG/DL (ref 6–20)
CALCIUM SERPL-MCNC: 8.1 MG/DL (ref 8.7–10.5)
CHLORIDE SERPL-SCNC: 95 MMOL/L (ref 95–110)
CO2 SERPL-SCNC: 32 MMOL/L (ref 23–29)
CREAT SERPL-MCNC: 0.5 MG/DL (ref 0.5–1.4)
DIFFERENTIAL METHOD: ABNORMAL
EOSINOPHIL # BLD AUTO: 0.1 K/UL (ref 0–0.5)
EOSINOPHIL NFR BLD: 2.6 % (ref 0–8)
ERYTHROCYTE [DISTWIDTH] IN BLOOD BY AUTOMATED COUNT: 18.6 % (ref 11.5–14.5)
EST. GFR  (NO RACE VARIABLE): >60 ML/MIN/1.73 M^2
GLUCOSE SERPL-MCNC: 79 MG/DL (ref 70–110)
HCT VFR BLD AUTO: 28.8 % (ref 37–48.5)
HGB BLD-MCNC: 8.7 G/DL (ref 12–16)
IMM GRANULOCYTES # BLD AUTO: 0.03 K/UL (ref 0–0.04)
IMM GRANULOCYTES NFR BLD AUTO: 1.1 % (ref 0–0.5)
INR PPP: 1.7 (ref 0.8–1.2)
LYMPHOCYTES # BLD AUTO: 0.4 K/UL (ref 1–4.8)
LYMPHOCYTES NFR BLD: 16.3 % (ref 18–48)
MCH RBC QN AUTO: 30.9 PG (ref 27–31)
MCHC RBC AUTO-ENTMCNC: 30.2 G/DL (ref 32–36)
MCV RBC AUTO: 102 FL (ref 82–98)
MONOCYTES # BLD AUTO: 0.3 K/UL (ref 0.3–1)
MONOCYTES NFR BLD: 11.9 % (ref 4–15)
NEUTROPHILS # BLD AUTO: 1.8 K/UL (ref 1.8–7.7)
NEUTROPHILS NFR BLD: 67.4 % (ref 38–73)
NRBC BLD-RTO: 0 /100 WBC
PLATELET # BLD AUTO: 82 K/UL (ref 150–450)
PMV BLD AUTO: 8.8 FL (ref 9.2–12.9)
POTASSIUM SERPL-SCNC: 3.7 MMOL/L (ref 3.5–5.1)
PROT SERPL-MCNC: 6.9 G/DL (ref 6–8.4)
PROTHROMBIN TIME: 17.2 SEC (ref 9–12.5)
RBC # BLD AUTO: 2.82 M/UL (ref 4–5.4)
SODIUM SERPL-SCNC: 131 MMOL/L (ref 136–145)
WBC # BLD AUTO: 2.7 K/UL (ref 3.9–12.7)

## 2023-06-15 PROCEDURE — 20600001 HC STEP DOWN PRIVATE ROOM

## 2023-06-15 PROCEDURE — 80053 COMPREHEN METABOLIC PANEL: CPT | Performed by: STUDENT IN AN ORGANIZED HEALTH CARE EDUCATION/TRAINING PROGRAM

## 2023-06-15 PROCEDURE — 85610 PROTHROMBIN TIME: CPT | Performed by: STUDENT IN AN ORGANIZED HEALTH CARE EDUCATION/TRAINING PROGRAM

## 2023-06-15 PROCEDURE — 85025 COMPLETE CBC W/AUTO DIFF WBC: CPT | Performed by: STUDENT IN AN ORGANIZED HEALTH CARE EDUCATION/TRAINING PROGRAM

## 2023-06-15 PROCEDURE — 25000003 PHARM REV CODE 250: Performed by: STUDENT IN AN ORGANIZED HEALTH CARE EDUCATION/TRAINING PROGRAM

## 2023-06-15 PROCEDURE — 63600175 PHARM REV CODE 636 W HCPCS: Performed by: STUDENT IN AN ORGANIZED HEALTH CARE EDUCATION/TRAINING PROGRAM

## 2023-06-15 PROCEDURE — 99233 PR SUBSEQUENT HOSPITAL CARE,LEVL III: ICD-10-PCS | Mod: ,,, | Performed by: HOSPITALIST

## 2023-06-15 PROCEDURE — 99233 SBSQ HOSP IP/OBS HIGH 50: CPT | Mod: ,,, | Performed by: HOSPITALIST

## 2023-06-15 PROCEDURE — 36415 COLL VENOUS BLD VENIPUNCTURE: CPT | Performed by: STUDENT IN AN ORGANIZED HEALTH CARE EDUCATION/TRAINING PROGRAM

## 2023-06-15 RX ORDER — LIDOCAINE 560 MG/1
1 PATCH PERCUTANEOUS; TOPICAL; TRANSDERMAL DAILY PRN
Status: ON HOLD | COMMUNITY
End: 2023-07-07

## 2023-06-15 RX ORDER — ONDANSETRON 4 MG/1
8 TABLET, FILM COATED ORAL 2 TIMES DAILY PRN
Status: ON HOLD | COMMUNITY
End: 2023-07-21 | Stop reason: HOSPADM

## 2023-06-15 RX ORDER — DULOXETIN HYDROCHLORIDE 60 MG/1
60 CAPSULE, DELAYED RELEASE ORAL DAILY
Status: ON HOLD | COMMUNITY
End: 2023-07-21 | Stop reason: HOSPADM

## 2023-06-15 RX ORDER — CELECOXIB 100 MG/1
100 CAPSULE ORAL 2 TIMES DAILY
COMMUNITY
End: 2023-06-30

## 2023-06-15 RX ADMIN — BUPROPION HYDROCHLORIDE 150 MG: 150 TABLET, FILM COATED, EXTENDED RELEASE ORAL at 08:06

## 2023-06-15 RX ADMIN — GABAPENTIN 900 MG: 300 CAPSULE ORAL at 09:06

## 2023-06-15 RX ADMIN — ENOXAPARIN SODIUM 40 MG: 40 INJECTION SUBCUTANEOUS at 09:06

## 2023-06-15 RX ADMIN — SULFAMETHOXAZOLE AND TRIMETHOPRIM 2 TABLET: 800; 160 TABLET ORAL at 09:06

## 2023-06-15 RX ADMIN — SPIRONOLACTONE 100 MG: 100 TABLET ORAL at 08:06

## 2023-06-15 RX ADMIN — LACTULOSE 20 G: 20 SOLUTION ORAL at 09:06

## 2023-06-15 RX ADMIN — FUROSEMIDE 40 MG: 10 INJECTION, SOLUTION INTRAMUSCULAR; INTRAVENOUS at 05:06

## 2023-06-15 RX ADMIN — DULOXETINE 30 MG: 30 CAPSULE, DELAYED RELEASE ORAL at 08:06

## 2023-06-15 RX ADMIN — LACTULOSE 20 G: 20 SOLUTION ORAL at 02:06

## 2023-06-15 RX ADMIN — ZINC SULFATE 220 MG (50 MG) CAPSULE 220 MG: CAPSULE at 08:06

## 2023-06-15 RX ADMIN — ERTAPENEM 1 G: 1 INJECTION INTRAMUSCULAR; INTRAVENOUS at 11:06

## 2023-06-15 RX ADMIN — ENOXAPARIN SODIUM 40 MG: 40 INJECTION SUBCUTANEOUS at 08:06

## 2023-06-15 RX ADMIN — PANTOPRAZOLE SODIUM 40 MG: 40 TABLET, DELAYED RELEASE ORAL at 08:06

## 2023-06-15 RX ADMIN — OXYCODONE HYDROCHLORIDE 10 MG: 10 TABLET ORAL at 08:06

## 2023-06-15 RX ADMIN — LACTULOSE 20 G: 20 SOLUTION ORAL at 08:06

## 2023-06-15 RX ADMIN — DULOXETINE 30 MG: 30 CAPSULE, DELAYED RELEASE ORAL at 09:06

## 2023-06-15 RX ADMIN — GABAPENTIN 900 MG: 300 CAPSULE ORAL at 02:06

## 2023-06-15 RX ADMIN — FUROSEMIDE 40 MG: 10 INJECTION, SOLUTION INTRAMUSCULAR; INTRAVENOUS at 08:06

## 2023-06-15 RX ADMIN — Medication 100 MG: at 08:06

## 2023-06-15 RX ADMIN — SULFAMETHOXAZOLE AND TRIMETHOPRIM 2 TABLET: 800; 160 TABLET ORAL at 08:06

## 2023-06-15 RX ADMIN — OXYCODONE HYDROCHLORIDE 10 MG: 10 TABLET ORAL at 10:06

## 2023-06-15 RX ADMIN — GABAPENTIN 900 MG: 300 CAPSULE ORAL at 08:06

## 2023-06-15 NOTE — SUBJECTIVE & OBJECTIVE
Interval History:   6/15: Patient with improvement of pain. Notes that she utilizes a wheelchair at home. Continue ertapenem    Review of Systems   Constitutional:  Negative for chills and fever.   HENT:  Negative for congestion and sore throat.    Eyes:  Negative for photophobia and visual disturbance.   Respiratory:  Negative for cough and shortness of breath.    Cardiovascular:  Positive for leg swelling. Negative for chest pain and palpitations.   Gastrointestinal:  Negative for abdominal pain, constipation, diarrhea, nausea and vomiting.   Endocrine: Negative for cold intolerance and heat intolerance.   Genitourinary:  Negative for dysuria and hematuria.   Musculoskeletal:  Positive for arthralgias and back pain. Negative for myalgias.   Skin:  Negative for rash.   Allergic/Immunologic: Negative for environmental allergies and food allergies.   Neurological:  Negative for dizziness, seizures, syncope and headaches.   Hematological:  Negative for adenopathy. Does not bruise/bleed easily.   Psychiatric/Behavioral:  Negative for hallucinations and suicidal ideas.    Objective:     Vital Signs (Most Recent):  Temp: 98 °F (36.7 °C) (06/15/23 1217)  Pulse: 101 (06/15/23 1304)  Resp: 18 (06/15/23 1217)  BP: 116/63 (06/15/23 1217)  SpO2: 97 % (06/15/23 1217) Vital Signs (24h Range):  Temp:  [98 °F (36.7 °C)-98.7 °F (37.1 °C)] 98 °F (36.7 °C)  Pulse:  [100-108] 101  Resp:  [16-18] 18  SpO2:  [97 %-99 %] 97 %  BP: (109-116)/(58-68) 116/63     Weight: 113.8 kg (250 lb 14.1 oz)  Body mass index is 39.29 kg/m².    Intake/Output Summary (Last 24 hours) at 6/15/2023 1526  Last data filed at 6/15/2023 1057  Gross per 24 hour   Intake --   Output 2825 ml   Net -2825 ml         Physical Exam  Constitutional:       Appearance: She is well-developed. She is obese. She is ill-appearing.   HENT:      Head: Normocephalic and atraumatic.   Eyes:      General: No scleral icterus.     Conjunctiva/sclera: Conjunctivae normal.       Pupils: Pupils are equal, round, and reactive to light.   Neck:      Thyroid: No thyromegaly.      Vascular: No JVD.   Cardiovascular:      Rate and Rhythm: Normal rate and regular rhythm.      Heart sounds: Normal heart sounds. No murmur heard.    No friction rub. No gallop.   Pulmonary:      Effort: Pulmonary effort is normal.      Breath sounds: Normal breath sounds. No wheezing or rales.   Abdominal:      General: Bowel sounds are normal. There is no distension.      Palpations: Abdomen is soft.      Tenderness: There is no abdominal tenderness. There is no guarding or rebound.   Musculoskeletal:         General: No tenderness. Normal range of motion.      Cervical back: Neck supple.      Right lower leg: Edema present.      Left lower leg: Edema present.   Skin:     General: Skin is warm and dry.      Coloration: Skin is not jaundiced.      Comments: Right thigh erythema improving, less indurated on 06/14.  Upon examination of patient's back, point tenderness appreciated along paraspinal muscles of thoracic spine, however no cellulitic changes appreciated.  Furthermore, no open ulcers appreciated.   Neurological:      Mental Status: She is alert and oriented to person, place, and time.      Cranial Nerves: No cranial nerve deficit.   Psychiatric:         Behavior: Behavior normal.           Significant Labs: All pertinent labs within the past 24 hours have been reviewed.    Significant Imaging: I have reviewed all pertinent imaging results/findings within the past 24 hours.

## 2023-06-15 NOTE — ASSESSMENT & PLAN NOTE
Patient growing ESBL Klebsiella. Urine that has been collected no longer cloudy. CT AP: Left renal non-obstructing calculi; no evidence of Pyelonephritis  - ID consult - ertapenem.  Will treat for 3 days.

## 2023-06-15 NOTE — ASSESSMENT & PLAN NOTE
MELD-Na: 21 at 6/15/2023  3:35 AM  MELD: 16 at 6/15/2023  3:35 AM  Calculated from:  Serum Creatinine: 0.5 mg/dL (Using min of 1 mg/dL) at 6/15/2023  3:35 AM  Serum Sodium: 131 mmol/L at 6/15/2023  3:35 AM  Total Bilirubin: 2.4 mg/dL at 6/15/2023  3:35 AM  INR(ratio): 1.7 at 6/15/2023  3:35 AM  Majority of mild common from elevated bilirubin and INR.  Patient's renal functions normal.  - Lactulose 20 mg, TID  - continue Lasix 40 b.i.d. and start Aldactone 100 mg, daily  - CT A/P:  Cirrhosis with splenomegaly and upper abdominal varices.  There is a right tips stent. small amount of abdominal ascites seen within the left lower quadrant of the abdomen. Anasarca  - not a transplant candidate.   - stable

## 2023-06-15 NOTE — CONSULTS
[facility] - [facility] Medicine  Telemedicine Consult Note    Patient Name: Rachel Zazueta  MRN: [id number]  Admission Date: 6/13/2023  Hospital Length of Stay: 2 days  Attending Physician: Katelin Tsang MD   Primary Care Provider: Dannielle Merino,      Thank you for your consult to [facility]. We have reviewed the patient chart. This patient does not meet criteria for [facility] service at this time due to [facility] service capacity limitations.  Will not assume care of the patient at this time.        Nona Wilkinson MD  Department of Hospital Medicine   [facility]

## 2023-06-15 NOTE — ASSESSMENT & PLAN NOTE
Patient with chronic back pain.  Reports worsening with accumulation of her anasarca.  Tried taking her home Suboxone which she said was left over from her previous attempts at treatment.  Suboxone did not help.  She states that she ran out of her opiates issues prescribed after she is discharged from LTAC  - no ulcerations or purulent skin changes noted to her back on exam  - Continue with pain control  - appears improved

## 2023-06-15 NOTE — PLAN OF CARE
Problem: Adult Inpatient Plan of Care  Goal: Plan of Care Review  Outcome: Ongoing, Progressing  Goal: Patient-Specific Goal (Individualized)  Outcome: Ongoing, Progressing  Goal: Absence of Hospital-Acquired Illness or Injury  Outcome: Ongoing, Progressing  Goal: Optimal Comfort and Wellbeing  Outcome: Ongoing, Progressing  Goal: Readiness for Transition of Care  Outcome: Ongoing, Progressing     Problem: Bariatric Environmental Safety  Goal: Safety Maintained with Care  Outcome: Ongoing, Progressing     Problem: Infection  Goal: Absence of Infection Signs and Symptoms  Outcome: Ongoing, Progressing     Problem: Skin Injury Risk Increased  Goal: Skin Health and Integrity  Outcome: Ongoing, Progressing     Problem: Impaired Wound Healing  Goal: Optimal Wound Healing  Outcome: Ongoing, Progressing    Pt AAOx4. On Rm air. No acute changes or distress noted during shift. Safety precautions maintained. Call light in reach. Will continue to monitor.

## 2023-06-15 NOTE — PROGRESS NOTES
[facility] - Telemetry [facility] Medicine  Progress Note    Patient Name: Rachel Zazueta  MRN: [id number]  Patient Class: IP- Inpatient   Admission Date: 6/13/2023  Length of Stay: 2 days  Attending Physician: Katelin Tsang MD  Primary Care Provider: Dannielle Merino DO        Subjective:     Principal Problem:UTI (urinary tract infection)        HPI:  43-year-old female with a history of HCV, IV drug use, Cirrhosis, Nephrolithiasis, Spinal fusion in 2021, Epidural abscess s/p hardware removal in 2022, and neurogenic bladder presents as a transfer from [facility] ED after she presented on 06/11 with worsening lower extremity edema, swelling and pain. Patient is wheelchair-bound. In addition, she also complains of chronic back pain and skin breakdown in her sacrum and lower extremities. She reports having taken a muscle relaxant and Suboxone at home with no relief. She denies any fever, chills, nausea, vomiting, hematemesis, cough, chest pain, palpitations, shortness of breath, abdominal pain/distension, changes in bowel or urinary habits, no hematochezia or melena. In the ED,she was noted have borderline low blood pressure and tachycardic on arrival. She received 2L of IV luids and started on Ceftriaxone. Patient was than transferred for further care    Of note she was previously admitted here at [facility] on January 19-February 27 with ESBL E coli bacteremia and thoracic diskitis s/p thoracic fusion. She had significant ascites requiring several paracenteses and was then discharged to LTAC. The following month she presented to [facility] 3/13-4/1/2023 with worsening ascites and cirrhosis. She underwent TIPS on 3/23. At the time she had developed Anasarca and worsening pancytopenia.       Overview/Hospital Course:  Patient diuresing well since being placed on Lasix b.i.d., infectious Disease met with patient and switch patient to ertapenem for meropenem.  Urine culture growing ESBL  Klebsiella       Interval History:   6/15: Patient with improvement of pain. Notes that she utilizes a wheelchair at home. Continue ertapenem    Review of Systems   Constitutional:  Negative for chills and fever.   HENT:  Negative for congestion and sore throat.    Eyes:  Negative for photophobia and visual disturbance.   Respiratory:  Negative for cough and shortness of breath.    Cardiovascular:  Positive for leg swelling. Negative for chest pain and palpitations.   Gastrointestinal:  Negative for abdominal pain, constipation, diarrhea, nausea and vomiting.   Endocrine: Negative for cold intolerance and heat intolerance.   Genitourinary:  Negative for dysuria and hematuria.   Musculoskeletal:  Positive for arthralgias and back pain. Negative for myalgias.   Skin:  Negative for rash.   Allergic/Immunologic: Negative for environmental allergies and food allergies.   Neurological:  Negative for dizziness, seizures, syncope and headaches.   Hematological:  Negative for adenopathy. Does not bruise/bleed easily.   Psychiatric/Behavioral:  Negative for hallucinations and suicidal ideas.    Objective:     Vital Signs (Most Recent):  Temp: 98 °F (36.7 °C) (06/15/23 1217)  Pulse: 101 (06/15/23 1304)  Resp: 18 (06/15/23 1217)  BP: 116/63 (06/15/23 1217)  SpO2: 97 % (06/15/23 1217) Vital Signs (24h Range):  Temp:  [98 °F (36.7 °C)-98.7 °F (37.1 °C)] 98 °F (36.7 °C)  Pulse:  [100-108] 101  Resp:  [16-18] 18  SpO2:  [97 %-99 %] 97 %  BP: (109-116)/(58-68) 116/63     Weight: 113.8 kg (250 lb 14.1 oz)  Body mass index is 39.29 kg/m².    Intake/Output Summary (Last 24 hours) at 6/15/2023 1526  Last data filed at 6/15/2023 1057  Gross per 24 hour   Intake --   Output 2825 ml   Net -2825 ml         Physical Exam  Constitutional:       Appearance: She is well-developed. She is obese. She is ill-appearing.   HENT:      Head: Normocephalic and atraumatic.   Eyes:      General: No scleral icterus.     Conjunctiva/sclera: Conjunctivae  normal.      Pupils: Pupils are equal, round, and reactive to light.   Neck:      Thyroid: No thyromegaly.      Vascular: No JVD.   Cardiovascular:      Rate and Rhythm: Normal rate and regular rhythm.      Heart sounds: Normal heart sounds. No murmur heard.    No friction rub. No gallop.   Pulmonary:      Effort: Pulmonary effort is normal.      Breath sounds: Normal breath sounds. No wheezing or rales.   Abdominal:      General: Bowel sounds are normal. There is no distension.      Palpations: Abdomen is soft.      Tenderness: There is no abdominal tenderness. There is no guarding or rebound.   Musculoskeletal:         General: No tenderness. Normal range of motion.      Cervical back: Neck supple.      Right lower leg: Edema present.      Left lower leg: Edema present.   Skin:     General: Skin is warm and dry.      Coloration: Skin is not jaundiced.      Comments: Right thigh erythema improving, less indurated on 06/14.  Upon examination of patient's back, point tenderness appreciated along paraspinal muscles of thoracic spine, however no cellulitic changes appreciated.  Furthermore, no open ulcers appreciated.   Neurological:      Mental Status: She is alert and oriented to person, place, and time.      Cranial Nerves: No cranial nerve deficit.   Psychiatric:         Behavior: Behavior normal.           Significant Labs: All pertinent labs within the past 24 hours have been reviewed.    Significant Imaging: I have reviewed all pertinent imaging results/findings within the past 24 hours.      Assessment/Plan:      * UTI (urinary tract infection)  Patient growing ESBL Klebsiella. Urine that has been collected no longer cloudy. CT AP: Left renal non-obstructing calculi; no evidence of Pyelonephritis  - ID consult - ertapenem.  Will treat for 3 days.    Anxiety and depression  - Resume Home medication    Debility  - PT/OT consult    Chronic midline low back pain with sciatica  Patient with chronic back pain.   Reports worsening with accumulation of her anasarca.  Tried taking her home Suboxone which she said was left over from her previous attempts at treatment.  Suboxone did not help.  She states that she ran out of her opiates issues prescribed after she is discharged from LTAC  - no ulcerations or purulent skin changes noted to her back on exam  - Continue with pain control  - appears improved    Substance use disorder  Urine tox: Positive for Amphetamine and Benzo  - Neuro checks q6h  - Fall/Seizure and Aspiration precauations  - stable    Chronic hepatitis C with cirrhosis  - See Cirrhosis  - patient has not received any treatment yet  - hcv rna positive as of February 2023    Cirrhosis of liver with ascites  MELD-Na: 21 at 6/15/2023  3:35 AM  MELD: 16 at 6/15/2023  3:35 AM  Calculated from:  Serum Creatinine: 0.5 mg/dL (Using min of 1 mg/dL) at 6/15/2023  3:35 AM  Serum Sodium: 131 mmol/L at 6/15/2023  3:35 AM  Total Bilirubin: 2.4 mg/dL at 6/15/2023  3:35 AM  INR(ratio): 1.7 at 6/15/2023  3:35 AM  Majority of mild common from elevated bilirubin and INR.  Patient's renal functions normal.  - Lactulose 20 mg, TID  - continue Lasix 40 b.i.d. and start Aldactone 100 mg, daily  - CT A/P:  Cirrhosis with splenomegaly and upper abdominal varices.  There is a right tips stent. small amount of abdominal ascites seen within the left lower quadrant of the abdomen. Anasarca  - not a transplant candidate.   - stable      VTE Risk Mitigation (From admission, onward)         Ordered     enoxaparin injection 40 mg  Every 12 hours         06/13/23 0740     IP VTE HIGH RISK PATIENT  Once         06/13/23 0223     Place sequential compression device  Until discontinued         06/13/23 0145                Discharge Planning   SHIRA: 6/15/2023     Code Status: Full Code   Is the patient medically ready for discharge?: No    Reason for patient still in hospital (select all that apply): Patient trending condition  Discharge Plan A:  Long-term acute care facility (LTAC)                  Katelin Tsang MD  Department of Hospital Medicine   [facility] - Telemetry Stepdown

## 2023-06-15 NOTE — ASSESSMENT & PLAN NOTE
Urine tox: Positive for Amphetamine and Benzo  - Neuro checks q6h  - Fall/Seizure and Aspiration precauations  - stable

## 2023-06-15 NOTE — PHARMACY MED REC
"  Admission Medication History     The home medication history was taken by Zbigniew Sinclair.    You may go to "Admission" then "Reconcile Home Medications" tabs to review and/or act upon these items.     The home medication list has been updated by the Pharmacy department.   Please read ALL comments highlighted in yellow.   Please address this information as you see fit.    Feel free to contact us if you have any questions or require assistance.      The medications listed below were removed from the home medication list. Please reorder if appropriate:  Patient reports no longer taking the following medication(s):  ALBUTEROL 1.25 MG/3ML NEBU  ASCORBIC ACID 500 MG TAB  BUPROPION  MG TB24  SENNA-DOCUSATE 8.6-50 MG TAB  SIMETHICONE 80 MG CHEWABLE TAB  THIAMINE 100 MG TAB  VITAMIN D3 1000 UNIT TAB  ZINC SULFATE 50 MG (220 MG) CAP    Medications listed below were obtained from: Patient  PTA Medications   Medication Sig    bumetanide (BUMEX) 1 MG tablet   Take 1 tablet (1 mg total) by mouth once daily.    calcium carbonate (TUMS) 200 mg calcium (500 mg) chewable tablet   Take 2 tablets (1,000 mg total) by mouth 3 (three) times daily as needed.    DULoxetine (CYMBALTA) 60 MG capsule   Take 60 mg by mouth once daily.    folic acid (FOLVITE) 1 MG tablet Take 1 mg by mouth daily as needed (vitamin supplement).      gabapentin (NEURONTIN) 300 MG capsule   Take 3 capsules (900 mg total) by mouth 3 (three) times daily.    hydrOXYzine HCL (ATARAX) 25 MG tablet   Take 1 tablet (25 mg total) by mouth 3 (three) times daily as needed for Anxiety.    LIDOcaine 4 % PtMd   Apply 1 patch topically daily as needed (Back pain).    melatonin (MELATIN) 3 mg tablet Take 2 tablets (6 mg) by mouth once to twice weekly as needed for insomnia.      ondansetron (ZOFRAN) 4 MG tablet   Take 8 mg by mouth 2 (two) times daily as needed for Nausea.    pantoprazole (PROTONIX) 40 MG tablet   Take 1 tablet (40 mg total) by mouth once daily.    " polyethylene glycol (GLYCOLAX) 17 gram PwPk   Take 17 g by mouth daily as needed.    acetaminophen (TYLENOL) 500 MG tablet   Take 2 tablets (1,000 mg total) by mouth every 12 (twelve) hours.    celecoxib (CELEBREX) 100 MG capsule   Take 100 mg by mouth 2 (two) times daily.    sodium chloride 0.9% SolP 100 mL with meropenem 1 gram SolR 2 g Inject 2 g into the vein every 8 (eight) hours.       Potential issues to be addressed PRIOR TO DISCHARGE  Patient reported not taking the following medications: (ROXICODONE). These medications remain on the home medication list. Please address accordingly.           Zbigniew Sinclair  EXT [id number]                .

## 2023-06-16 ENCOUNTER — TELEPHONE (OUTPATIENT)
Dept: HEPATOLOGY | Facility: CLINIC | Age: 43
End: 2023-06-16
Payer: MEDICAID

## 2023-06-16 ENCOUNTER — PATIENT MESSAGE (OUTPATIENT)
Dept: HEPATOLOGY | Facility: CLINIC | Age: 43
End: 2023-06-16
Payer: MEDICAID

## 2023-06-16 VITALS
DIASTOLIC BLOOD PRESSURE: 56 MMHG | HEIGHT: 67 IN | WEIGHT: 258.81 LBS | TEMPERATURE: 99 F | SYSTOLIC BLOOD PRESSURE: 120 MMHG | BODY MASS INDEX: 40.62 KG/M2 | RESPIRATION RATE: 16 BRPM | HEART RATE: 118 BPM | OXYGEN SATURATION: 99 %

## 2023-06-16 LAB
ALBUMIN SERPL BCP-MCNC: 1.7 G/DL (ref 3.5–5.2)
ALP SERPL-CCNC: 146 U/L (ref 55–135)
ALT SERPL W/O P-5'-P-CCNC: 22 U/L (ref 10–44)
ANION GAP SERPL CALC-SCNC: 5 MMOL/L (ref 8–16)
AST SERPL-CCNC: 56 U/L (ref 10–40)
BACTERIA BLD CULT: NORMAL
BACTERIA BLD CULT: NORMAL
BASOPHILS # BLD AUTO: 0.02 K/UL (ref 0–0.2)
BASOPHILS NFR BLD: 0.7 % (ref 0–1.9)
BILIRUB SERPL-MCNC: 2.4 MG/DL (ref 0.1–1)
BUN SERPL-MCNC: 9 MG/DL (ref 6–20)
CALCIUM SERPL-MCNC: 8.4 MG/DL (ref 8.7–10.5)
CHLORIDE SERPL-SCNC: 99 MMOL/L (ref 95–110)
CO2 SERPL-SCNC: 28 MMOL/L (ref 23–29)
CREAT SERPL-MCNC: 0.6 MG/DL (ref 0.5–1.4)
DIFFERENTIAL METHOD: ABNORMAL
EOSINOPHIL # BLD AUTO: 0.1 K/UL (ref 0–0.5)
EOSINOPHIL NFR BLD: 2.5 % (ref 0–8)
ERYTHROCYTE [DISTWIDTH] IN BLOOD BY AUTOMATED COUNT: 18.6 % (ref 11.5–14.5)
EST. GFR  (NO RACE VARIABLE): >60 ML/MIN/1.73 M^2
GLUCOSE SERPL-MCNC: 75 MG/DL (ref 70–110)
HCT VFR BLD AUTO: 30.8 % (ref 37–48.5)
HGB BLD-MCNC: 9.2 G/DL (ref 12–16)
IMM GRANULOCYTES # BLD AUTO: 0.04 K/UL (ref 0–0.04)
IMM GRANULOCYTES NFR BLD AUTO: 1.4 % (ref 0–0.5)
INR PPP: 1.6 (ref 0.8–1.2)
LYMPHOCYTES # BLD AUTO: 0.6 K/UL (ref 1–4.8)
LYMPHOCYTES NFR BLD: 20.6 % (ref 18–48)
MCH RBC QN AUTO: 30.6 PG (ref 27–31)
MCHC RBC AUTO-ENTMCNC: 29.9 G/DL (ref 32–36)
MCV RBC AUTO: 102 FL (ref 82–98)
MONOCYTES # BLD AUTO: 0.3 K/UL (ref 0.3–1)
MONOCYTES NFR BLD: 11.2 % (ref 4–15)
NEUTROPHILS # BLD AUTO: 1.8 K/UL (ref 1.8–7.7)
NEUTROPHILS NFR BLD: 63.6 % (ref 38–73)
NRBC BLD-RTO: 0 /100 WBC
PLATELET # BLD AUTO: 77 K/UL (ref 150–450)
PMV BLD AUTO: 8.5 FL (ref 9.2–12.9)
POTASSIUM SERPL-SCNC: 4.4 MMOL/L (ref 3.5–5.1)
PROT SERPL-MCNC: 6.9 G/DL (ref 6–8.4)
PROTHROMBIN TIME: 16.6 SEC (ref 9–12.5)
RBC # BLD AUTO: 3.01 M/UL (ref 4–5.4)
SODIUM SERPL-SCNC: 132 MMOL/L (ref 136–145)
WBC # BLD AUTO: 2.77 K/UL (ref 3.9–12.7)

## 2023-06-16 PROCEDURE — 85025 COMPLETE CBC W/AUTO DIFF WBC: CPT | Performed by: STUDENT IN AN ORGANIZED HEALTH CARE EDUCATION/TRAINING PROGRAM

## 2023-06-16 PROCEDURE — 99239 HOSP IP/OBS DSCHRG MGMT >30: CPT | Mod: ,,, | Performed by: HOSPITALIST

## 2023-06-16 PROCEDURE — 99239 PR HOSPITAL DISCHARGE DAY,>30 MIN: ICD-10-PCS | Mod: ,,, | Performed by: HOSPITALIST

## 2023-06-16 PROCEDURE — 36415 COLL VENOUS BLD VENIPUNCTURE: CPT | Performed by: STUDENT IN AN ORGANIZED HEALTH CARE EDUCATION/TRAINING PROGRAM

## 2023-06-16 PROCEDURE — 63600175 PHARM REV CODE 636 W HCPCS: Performed by: STUDENT IN AN ORGANIZED HEALTH CARE EDUCATION/TRAINING PROGRAM

## 2023-06-16 PROCEDURE — 85610 PROTHROMBIN TIME: CPT | Performed by: STUDENT IN AN ORGANIZED HEALTH CARE EDUCATION/TRAINING PROGRAM

## 2023-06-16 PROCEDURE — 80053 COMPREHEN METABOLIC PANEL: CPT | Performed by: STUDENT IN AN ORGANIZED HEALTH CARE EDUCATION/TRAINING PROGRAM

## 2023-06-16 PROCEDURE — 25000003 PHARM REV CODE 250: Performed by: STUDENT IN AN ORGANIZED HEALTH CARE EDUCATION/TRAINING PROGRAM

## 2023-06-16 RX ORDER — SULFAMETHOXAZOLE AND TRIMETHOPRIM 800; 160 MG/1; MG/1
2 TABLET ORAL 2 TIMES DAILY
Qty: 6 TABLET | Refills: 0 | Status: ON HOLD | OUTPATIENT
Start: 2023-06-16 | End: 2023-07-07

## 2023-06-16 RX ORDER — LACTULOSE 10 G/15ML
20 SOLUTION ORAL 3 TIMES DAILY
Status: ON HOLD
Start: 2023-06-16 | End: 2023-07-21 | Stop reason: HOSPADM

## 2023-06-16 RX ORDER — OXYCODONE HYDROCHLORIDE 5 MG/1
5 TABLET ORAL
Qty: 21 TABLET | Refills: 0
Start: 2023-06-16 | End: 2023-06-16 | Stop reason: SDUPTHER

## 2023-06-16 RX ORDER — ENOXAPARIN SODIUM 100 MG/ML
40 INJECTION SUBCUTANEOUS EVERY 24 HOURS
Status: DISCONTINUED | OUTPATIENT
Start: 2023-06-16 | End: 2023-06-16 | Stop reason: HOSPADM

## 2023-06-16 RX ORDER — SPIRONOLACTONE 100 MG/1
100 TABLET, FILM COATED ORAL DAILY
Qty: 30 TABLET | Refills: 11 | Status: ON HOLD | OUTPATIENT
Start: 2023-06-17 | End: 2023-07-21 | Stop reason: HOSPADM

## 2023-06-16 RX ORDER — OXYCODONE HYDROCHLORIDE 5 MG/1
5 TABLET ORAL
Qty: 21 TABLET | Refills: 0 | Status: SHIPPED | OUTPATIENT
Start: 2023-06-16 | End: 2023-06-30

## 2023-06-16 RX ORDER — FUROSEMIDE 40 MG/1
40 TABLET ORAL 2 TIMES DAILY
Status: DISCONTINUED | OUTPATIENT
Start: 2023-06-16 | End: 2023-06-16 | Stop reason: HOSPADM

## 2023-06-16 RX ORDER — SULFAMETHOXAZOLE AND TRIMETHOPRIM 800; 160 MG/1; MG/1
2 TABLET ORAL 2 TIMES DAILY
Qty: 16 TABLET | Refills: 0 | Status: SHIPPED | OUTPATIENT
Start: 2023-06-16 | End: 2023-06-16 | Stop reason: SDUPTHER

## 2023-06-16 RX ADMIN — LACTULOSE 20 G: 20 SOLUTION ORAL at 08:06

## 2023-06-16 RX ADMIN — OXYCODONE HYDROCHLORIDE 10 MG: 10 TABLET ORAL at 08:06

## 2023-06-16 RX ADMIN — Medication 100 MG: at 08:06

## 2023-06-16 RX ADMIN — ZINC SULFATE 220 MG (50 MG) CAPSULE 220 MG: CAPSULE at 08:06

## 2023-06-16 RX ADMIN — SULFAMETHOXAZOLE AND TRIMETHOPRIM 2 TABLET: 800; 160 TABLET ORAL at 08:06

## 2023-06-16 RX ADMIN — LACTULOSE 20 G: 20 SOLUTION ORAL at 02:06

## 2023-06-16 RX ADMIN — FUROSEMIDE 40 MG: 10 INJECTION, SOLUTION INTRAMUSCULAR; INTRAVENOUS at 08:06

## 2023-06-16 RX ADMIN — OXYCODONE HYDROCHLORIDE 10 MG: 10 TABLET ORAL at 05:06

## 2023-06-16 RX ADMIN — BUPROPION HYDROCHLORIDE 150 MG: 150 TABLET, FILM COATED, EXTENDED RELEASE ORAL at 08:06

## 2023-06-16 RX ADMIN — GABAPENTIN 900 MG: 300 CAPSULE ORAL at 02:06

## 2023-06-16 RX ADMIN — GABAPENTIN 900 MG: 300 CAPSULE ORAL at 08:06

## 2023-06-16 RX ADMIN — PANTOPRAZOLE SODIUM 40 MG: 40 TABLET, DELAYED RELEASE ORAL at 08:06

## 2023-06-16 RX ADMIN — SPIRONOLACTONE 100 MG: 100 TABLET ORAL at 08:06

## 2023-06-16 RX ADMIN — DULOXETINE 30 MG: 30 CAPSULE, DELAYED RELEASE ORAL at 08:06

## 2023-06-16 NOTE — PLAN OF CARE
[facility] - Telemetry Stepdown      HOME HEALTH ORDERS  FACE TO FACE ENCOUNTER    Patient Name: Rachel Zazueta  YOB: 1980    PCP: Dannielle Merino DO   PCP Address: [facility] [address] / [facility] [address]  PCP Phone Number: [phone]  PCP Fax: [phone]    Encounter Date: 6/11/23    Admit to Home Health    Diagnoses:  Active Hospital Problems    Diagnosis  POA    *UTI (urinary tract infection) [N39.0]  Yes    Anxiety and depression [F41.9, F32.A]  Yes     Chronic    Debility [R53.81]  Yes    Chronic midline low back pain with sciatica [M54.40, G89.29]  Yes    Substance use disorder [F19.90]  Yes     Chronic    Chronic hepatitis C with cirrhosis [B18.2, K74.60]  Yes     Chronic    Cirrhosis of liver with ascites [K74.60, R18.8]  Yes     Chronic      Resolved Hospital Problems   No resolved problems to display.       Follow Up Appointments:  No future appointments.    Allergies:  Review of patient's allergies indicates:   Allergen Reactions    Bee venom protein (honey bee) Anaphylaxis     Patient reports she is allergic to bee stings.    Naproxen Anaphylaxis     Throat closing    12-23- Patient reports taking Ibuprofen 200 mg at home without problems. Verified X3. [place]    Wasp sting [allergen ext-venom-honey bee] Anaphylaxis    Adhesive Blisters    Shellfish containing products     Iodine and iodide containing products Hives and Itching     Allergic to iodine in seafood only    Nuts [tree nut] Rash       Medications: Review discharge medications with patient and family and provide education.    Current Facility-Administered Medications   Medication Dose Route Frequency Provider Last Rate Last Admin    albuterol inhaler 2 puff  2 puff Inhalation Q6H PRN Carmel Manning MD        buPROPion TB24 tablet 150 mg  150 mg Oral Daily Carmel Manning MD   150 mg at 06/16/23 0820    dextrose 10% bolus 125 mL 125 mL  12.5 g Intravenous PRN Carmel Manning MD        dextrose 10% bolus 250 mL 250 mL   25 g Intravenous PRN Carmel Manning MD        dextrose 40 % gel 15,000 mg  15 g Oral PRN Carmel Manning MD        dextrose 40 % gel 30,000 mg  30 g Oral PRN Carmel Manning MD        DULoxetine DR capsule 30 mg  30 mg Oral BID Carmel Manning MD   30 mg at 06/16/23 0818    enoxaparin injection 40 mg  40 mg Subcutaneous Q24H (prophylaxis, 1700) Katelin Tsang MD        furosemide tablet 40 mg  40 mg Oral BID Katelin Tsang MD        gabapentin capsule 900 mg  900 mg Oral TID Carmel Manning MD   900 mg at 06/16/23 0818    lactulose 20 gram/30 mL solution Soln 20 g  20 g Oral TID Carmle Manning MD   20 g at 06/16/23 0818    melatonin tablet 6 mg  6 mg Oral Nightly PRN Carmel Manning MD        oxyCODONE immediate release tablet 5 mg  5 mg Oral Q6H PRN Jessica Boyer MD   5 mg at 06/13/23 1040    oxyCODONE immediate release tablet Tab 10 mg  10 mg Oral Q6H PRN Jessica Boyer MD   10 mg at 06/16/23 0824    pantoprazole EC tablet 40 mg  40 mg Oral Daily Carmel Manning MD   40 mg at 06/16/23 0820    senna-docusate 8.6-50 mg per tablet 1 tablet  1 tablet Oral BID PRN Carmel Manning MD        sodium chloride 0.9% flush 10 mL  10 mL Intravenous PRN Carmel Manning MD        spironolactone tablet 100 mg  100 mg Oral Daily Carmel Maninng MD   100 mg at 06/16/23 0818    sulfamethoxazole-trimethoprim 800-160mg per tablet 2 tablet  2 tablet Oral BID Jessica Boyer MD   2 tablet at 06/16/23 0820    thiamine tablet 100 mg  100 mg Oral Daily Carmel Manning MD   100 mg at 06/16/23 0818    zinc sulfate capsule 220 mg  220 mg Oral Daily Carmel Manning MD   220 mg at 06/16/23 0818     Current Discharge Medication List        START taking these medications    Details   lactulose (CHRONULAC) 20 gram/30 mL Soln Take 30 mLs (20 g total) by mouth 3 (three) times daily.      spironolactone (ALDACTONE) 100 MG tablet Take 1 tablet (100 mg total) by mouth once daily.  Qty: 30 tablet, Refills: 11      sulfamethoxazole-trimethoprim  800-160mg (BACTRIM DS) 800-160 mg Tab Take 2 tablets by mouth 2 (two) times daily. for 4 days  Qty: 16 tablet, Refills: 0           CONTINUE these medications which have CHANGED    Details   oxyCODONE (ROXICODONE) 5 MG immediate release tablet Take 1 tablet (5 mg total) by mouth every 3 (three) hours as needed for Pain.  Qty: 21 tablet, Refills: 0    Comments: Quantity prescribed more than 7 day supply? No           CONTINUE these medications which have NOT CHANGED    Details   bumetanide (BUMEX) 1 MG tablet Take 1 tablet (1 mg total) by mouth once daily.  Qty: 30 tablet, Refills: 11      calcium carbonate (TUMS) 200 mg calcium (500 mg) chewable tablet Take 2 tablets (1,000 mg total) by mouth 3 (three) times daily as needed.      DULoxetine (CYMBALTA) 60 MG capsule Take 60 mg by mouth once daily.      folic acid (FOLVITE) 1 MG tablet Take 1 tablet (1 mg total) by mouth once daily.  Qty: 42 tablet, Refills: 0    Comments: Take while on Bactrim      gabapentin (NEURONTIN) 300 MG capsule Take 3 capsules (900 mg total) by mouth 3 (three) times daily.  Qty: 270 capsule, Refills: 11      hydrOXYzine HCL (ATARAX) 25 MG tablet Take 1 tablet (25 mg total) by mouth 3 (three) times daily as needed for Anxiety.      LIDOcaine 4 % PtMd Apply 1 patch topically daily as needed (Back pain).      melatonin (MELATIN) 3 mg tablet Take 2 tablets (6 mg total) by mouth nightly as needed for Insomnia.  Refills: 0      ondansetron (ZOFRAN) 4 MG tablet Take 8 mg by mouth 2 (two) times daily as needed for Nausea.      pantoprazole (PROTONIX) 40 MG tablet Take 1 tablet (40 mg total) by mouth once daily.  Qty: 30 tablet, Refills: 1      polyethylene glycol (GLYCOLAX) 17 gram PwPk Take 17 g by mouth daily as needed.  Refills: 0      acetaminophen (TYLENOL) 500 MG tablet Take 2 tablets (1,000 mg total) by mouth every 12 (twelve) hours.  Refills: 0      celecoxib (CELEBREX) 100 MG capsule Take 100 mg by mouth 2 (two) times daily.           STOP  taking these medications       albuterol (ACCUNEB) 1.25 mg/3 mL Nebu Comments:   Reason for Stopping:         buPROPion (WELLBUTRIN XL) 150 MG TB24 tablet Comments:   Reason for Stopping:         senna-docusate 8.6-50 mg (PERICOLACE) 8.6-50 mg per tablet Comments:   Reason for Stopping:         sodium chloride 0.9% SolP 100 mL with meropenem 1 gram SolR 2 g Comments:   Reason for Stopping:         thiamine 100 MG tablet Comments:   Reason for Stopping:         zinc sulfate (ZINCATE) 50 mg zinc (220 mg) capsule Comments:   Reason for Stopping:                 I have seen and examined this patient within the last 30 days. My clinical findings that support the need for the home health skilled services and home bound status are the following:no   Weakness/numbness causing balance and gait disturbance due to Infection making it taxing to leave home.     Diet:   2 gram sodium diet    Labs:  none    Referrals/ Consults  Physical Therapy to evaluate and treat. Evaluate for home safety and equipment needs; Establish/upgrade home exercise program. Perform / instruct on therapeutic exercises, gait training, transfer training, and Range of Motion.  Occupational Therapy to evaluate and treat. Evaluate home environment for safety and equipment needs. Perform/Instruct on transfers, ADL training, ROM, and therapeutic exercises.   to evaluate for community resources/long-range planning.  Aide to provide assistance with personal care, ADLs, and vital signs.    Activities:   activity as tolerated    Nursing:   Agency to admit patient within 24 hours of hospital discharge unless specified on physician order or at patient request    SN to complete comprehensive assessment including routine vital signs. Instruct on disease process and s/s of complications to report to MD. Review/verify medication list sent home with the patient at time of discharge  and instruct patient/caregiver as needed. Frequency may be adjusted  depending on start of care date.     Skilled nurse to perform up to 3 visits PRN for symptoms related to diagnosis    Notify MD if SBP > 160 or < 90; DBP > 90 or < 50; HR > 120 or < 50; Temp > 101; O2 < 88%; Other:       Ok to schedule additional visits based on staff availability and patient request on consecutive days within the home health episode.    When multiple disciplines ordered:    Start of Care occurs on Sunday - Wednesday schedule remaining discipline evaluations as ordered on separate consecutive days following the start of care.    Thursday SOC -schedule subsequent evaluations Friday and Monday the following week.     Friday - Saturday SOC - schedule subsequent discipline evaluations on consecutive days starting Monday of the following week.    For all post-discharge communication and subsequent orders please contact patient's primary care physician. If unable to reach primary care physician or do not receive response within 30 minutes, please contact [phone] for clinical staff order clarification    Miscellaneous   Routine Skin for Bedridden Patients: Instruct patient/caregiver to apply moisture barrier cream to all skin folds and wet areas in perineal area daily and after baths and all bowel movements.    Home Health Aide:  Nursing Three times weekly, Physical Therapy Three times weekly, Occupational Therapy Three times weekly, Medical Social Work Three times weekly, and Home Health Aide Three times weekly    Wound Care Orders      BID/PRN - apply Triad to gluteal cleft, BL posterior thighs. Keep pt clean and dry, no diapers, allow pt breaks from purewick use.    Daily - paint R heel w/ betadine, apply foam dressing. Monitor for rupture.    Q3 days - R anterior calf - cleanse wound w/ NS, pat dry. Apply Mepilex Ag to wound bed (sticky side on skin) and secure w/ foam dressing.    I certify that this patient is confined to her home and needs PT, OT, aide, wound care, skilled nursing    Katelin  Trinh POPE

## 2023-06-16 NOTE — DISCHARGE INSTRUCTIONS
BID/PRN - apply Triad to gluteal cleft, BL posterior thighs. Keep pt clean and dry, no diapers, allow pt breaks from purewick use.    Daily - paint R heel w/ betadine, apply foam dressing. Monitor for rupture.    Q3 days - R anterior calf - cleanse wound w/ NS, pat dry. Apply Mepilex Ag to wound bed (sticky side on skin) and secure w/ foam dressing.

## 2023-06-16 NOTE — PLAN OF CARE
CHW scheduled pt's hospital f/u visit on 6/20/23 @ 10:00 am.  Pt has a hospital f/u appointment with Hepatology on 8/23/23 @ 2:30 pm.

## 2023-06-16 NOTE — TELEPHONE ENCOUNTER
Returned call to patient to schedule follow up visit.  No answer, mailbox is full.  Sent message to [employer].  Reminder letter mailed.

## 2023-06-16 NOTE — PLAN OF CARE
[facility] - Telemetry Stepdown  Discharge Final Note    Primary Care Provider: Dannielle Merino DO    Expected Discharge Date: 6/16/2023    Patient has been referred to outpatient wound clinic near home and outpatient case management. Patient is ready to discharge from case management standpoint.    Final Discharge Note (most recent)       Final Note - 06/16/23 1434          Final Note    Assessment Type Final Discharge Note     Anticipated Discharge Disposition Home or Self Care     What phone number can be called within the next 1-3 days to see how you are doing after discharge? [phone]     Hospital Resources/Appts/Education Provided Provided patient/caregiver with written discharge plan information;Appointments scheduled by Navigator/Coordinator;Post-Acute resouces added to AVS        Post-Acute Status    Discharge Delays None known at this time                   Important Message from Medicare           Update at 5:00 PM  Patient requested HH referrals be sent to see prior-auth can be obtained from Medicaid.    Careport referrals sent to 8 agencies near [place] address.    Yahaira Meadows RN  Weekend  - [facility] [place]  Spectralink: [phone]

## 2023-06-16 NOTE — PLAN OF CARE
Update  CM arranged discharge transportation to: [address], [place] [address], via [facility] requested pickup time 3:20 PM. Emailed updated address to admission's dept. To update in [facility].      CM attended huddle with nursing staff earlier today. Patient is medically ready to discharge home. Attending physician said patient needs transportation arranged.    Yahaira Meadows RN  Weekend  - [facility] [place]  Spectralink: [phone]

## 2023-06-21 ENCOUNTER — PATIENT MESSAGE (OUTPATIENT)
Dept: ADMINISTRATIVE | Facility: CLINIC | Age: 43
End: 2023-06-21
Payer: MEDICAID

## 2023-06-21 ENCOUNTER — PATIENT OUTREACH (OUTPATIENT)
Dept: ADMINISTRATIVE | Facility: CLINIC | Age: 43
End: 2023-06-21
Payer: MEDICAID

## 2023-06-21 NOTE — PROGRESS NOTES
C3 nurse attempted to contact Rachel Zazueta  for a TCC post hospital discharge follow up call. Patient not available, callback information provided to listed contact. The patient had a scheduled HOSFU appointment with  Dannielle Merino DO on 6/20/23 at 10:00 AM.

## 2023-06-22 NOTE — PROGRESS NOTES
C3 nurse spoke with Rachel Zazueta  for a TCC post hospital discharge follow up call. Patient reports she missed Hospital Follow Up with Dannielle Merino DO on 6/20/23 at 10:00 AM, requesting appt to be rescheduled. C3 nurse was unable to schedule HOSFU appointment in [facility] w/in 5-7 days post discharge date 6/16/23.     Message sent to PCP staff requesting they contact patient and schedule follow up appointment.

## 2023-06-30 ENCOUNTER — HOSPITAL ENCOUNTER (EMERGENCY)
Facility: HOSPITAL | Age: 43
Discharge: HOME | End: 2023-07-01
Attending: STUDENT IN AN ORGANIZED HEALTH CARE EDUCATION/TRAINING PROGRAM
Payer: MEDICAID

## 2023-06-30 VITALS
WEIGHT: 250 LBS | SYSTOLIC BLOOD PRESSURE: 119 MMHG | BODY MASS INDEX: 39.24 KG/M2 | HEART RATE: 114 BPM | TEMPERATURE: 97 F | DIASTOLIC BLOOD PRESSURE: 68 MMHG | OXYGEN SATURATION: 99 % | HEIGHT: 67 IN | RESPIRATION RATE: 18 BRPM

## 2023-06-30 DIAGNOSIS — S30.0XXA COCCYX CONTUSION, INITIAL ENCOUNTER: Primary | ICD-10-CM
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
## 2023-06-30 PROCEDURE — 99285 EMERGENCY DEPT VISIT HI MDM: CPT | Mod: 25

## 2023-06-30 PROCEDURE — 63600175 PHARM REV CODE 636 W HCPCS

## 2023-06-30 PROCEDURE — 96372 THER/PROPH/DIAG INJ SC/IM: CPT

## 2023-06-30 RX ORDER — ONDANSETRON 2 MG/ML
4 INJECTION INTRAMUSCULAR; INTRAVENOUS
Status: COMPLETED | OUTPATIENT
Start: 2023-06-30 | End: 2023-06-30

## 2023-06-30 RX ORDER — HYDROMORPHONE HYDROCHLORIDE 1 MG/ML
2 INJECTION, SOLUTION INTRAMUSCULAR; INTRAVENOUS; SUBCUTANEOUS
Status: COMPLETED | OUTPATIENT
Start: 2023-06-30 | End: 2023-06-30

## 2023-06-30 RX ORDER — TIZANIDINE 4 MG/1
4 TABLET ORAL 3 TIMES DAILY PRN
Qty: 15 TABLET | Refills: 0 | Status: ON HOLD | OUTPATIENT
Start: 2023-06-30 | End: 2023-07-21 | Stop reason: HOSPADM

## 2023-06-30 RX ORDER — OXYCODONE HYDROCHLORIDE 5 MG/1
5 TABLET ORAL EVERY 8 HOURS PRN
Qty: 12 TABLET | Refills: 0 | Status: SHIPPED | OUTPATIENT
Start: 2023-06-30 | End: 2023-07-04

## 2023-06-30 RX ADMIN — ONDANSETRON 4 MG: 2 INJECTION INTRAMUSCULAR; INTRAVENOUS at 01:06

## 2023-06-30 RX ADMIN — HYDROMORPHONE HYDROCHLORIDE 2 MG: 1 INJECTION, SOLUTION INTRAMUSCULAR; INTRAVENOUS; SUBCUTANEOUS at 01:06

## 2023-06-30 NOTE — ED TRIAGE NOTES
To ED per [facility] with c/o slipping out of wheelchair onto her buttocks when attempting to get to her bedside commode. C/o pain to lower back radiating  to upper back .

## 2023-06-30 NOTE — ED NOTES
Assumed patient care after receiving report from RAUL Weiss. Patient AAOx4 with NADN and resting comfortably in ED stretcher with spontaneous, even, and unlabored respirations. Patient denies any SOB, chest pains, or dizziness.  Airway patent, skin warm, dry and intact. Patient rates pain 0/10. Bed lowered, wheels locked, side rails up x2, and call bell in reach.  Patient updated on plan of care and denies any needs at the moment. Patient instructed to call for any needs, patient v/u.     Patient notified of need for transport home, she reports family is coming. Will notify ED staff once family arrives to ED.

## 2023-06-30 NOTE — ED PROVIDER NOTES
Encounter Date: 6/30/2023       History     Chief Complaint   Patient presents with    Fall     This note is dictated on M*Modal word recognition program.  There are word recognition mistakes and grammatical errors that are occasionally missed on review.     Rachel Zazueta is a 42 y.o. female presents to ER today with complaints of mechanical fall out of her wheelchair.  Patient reports her wheelchair was not locked and started to roll backwards when she slid out of the wheelchair injuring her tailbone.  Patient reports she hit the ground with her tailbone and tailbone has been sore.  Patient rates 10/10 pain to her tailbone at this time.  Patient reports she actually had a PCP follow-up today however could not get a Medicaid ride set up.  Therefore she missed her PCP appointment today.  Patient noted to be tachycardic at 125bpm due to 10/10 pain.  Will treat patient's pain.        Review of patient's allergies indicates:   Allergen Reactions    Bee venom protein (honey bee) Anaphylaxis     Patient reports she is allergic to bee stings.    Naproxen Anaphylaxis     Throat closing    12-23- Patient reports taking Ibuprofen 200 mg at home without problems. Verified X3. [place]    Wasp sting [allergen ext-venom-honey bee] Anaphylaxis    Adhesive Blisters    Shellfish containing products     Iodine and iodide containing products Hives and Itching     Allergic to iodine in seafood only    Nuts [tree nut] Rash     Past Medical History:   Diagnosis Date    Anemia     Anxiety     Depressed     Diskitis     Hep C w/o coma, chronic     IV drug abuse     Kidney stone     Liver cirrhosis      Past Surgical History:   Procedure Laterality Date    ARTHROTOMY OF SHOULDER Left 11/15/2022    Procedure: ARTHROTOMY, SHOULDER;  Surgeon: Bjorn Hayes MD;  Location: [facility];  Service: Orthopedics;  Laterality: Left;  Left acromioclavicular joint arthrotomy    BACK SURGERY      benine tumor removal      forehead, age 9    CHOLECYSTECTOMY       KIDNEY SURGERY      LUMBAR FUSION Bilateral 3/2/2022    Procedure: FUSION, SPINE, LUMBAR;  Surgeon: Guille Templeton MD;  Location: [facility] OR 2ND FLR;  Service: Neurosurgery;  Laterality: Bilateral;  AIRO  T10--Pelvis    LUMBAR FUSION N/A 1/24/2023    Procedure: **AIRO** T8-T12 POSTERIOR FUSION, T8-T10 LAMINECTOMY, HARDWARE REMOVAL AND WASHOUT;  Surgeon: Guille Templeton MD;  Location: [facility] OR [facility] FLR;  Service: Neurosurgery;  Laterality: N/A;    REMOVAL OF HARDWARE FROM SPINE N/A 2/21/2022    Procedure: REMOVAL, HARDWARE, SPINE;  Surgeon: Guille Templeton MD;  Location: [facility] OR 2ND FLR;  Service: Neurosurgery;  Laterality: N/A;  [place]ashout    SPINE SURGERY      THORACIC LAMINECTOMY WITH FUSION N/A 2/2/2023    Procedure: LAMINECTOMY, SPINE, THORACIC, WITH FUSION (T4-Pelvis fusion w/ T9-10 corpectomies) **AIRO;  Surgeon: Guille Templeton MD;  Location: [facility] FLR;  Service: Neurosurgery;  Laterality: N/A;  AIRO, 2 bovies, aquamantys, Globus, Depuy, antibiotic beads, TXA, Peroxide; Babycos plastics closure     Family History   Problem Relation Age of Onset    Hepatitis Mother     Drug abuse Mother     Liver cancer Mother     Drug abuse Father     Cirrhosis Father     Hepatitis Father      Social History     Tobacco Use    Smoking status: Some Days     Packs/day: 0.50     Years: 23.00     Pack years: 11.50     Types: Cigarettes    Smokeless tobacco: Never    Tobacco comments:     patient states she knows she nees to quit.   Substance Use Topics    Alcohol use: Not Currently     Comment: quit 2014    Drug use: Not Currently     Frequency: 4.0 times per week     Types: Marijuana     Comment: Patient denies needle use, only inhalation of methampehtamines or orally.  Last known drug use was prior to admission to hospital stay for surgery     Review of Systems   Constitutional: Negative.    HENT: Negative.     Eyes: Negative.    Respiratory: Negative.     Cardiovascular: Negative.    Gastrointestinal: Negative.    Endocrine:  Negative.    Genitourinary: Negative.    Musculoskeletal:  Positive for back pain.        As per HPI.   Skin: Negative.    Allergic/Immunologic: Negative.    Neurological: Negative.    Hematological: Negative.    Psychiatric/Behavioral: Negative.       Physical Exam     Initial Vitals [06/30/23 1324]   BP Pulse Resp Temp SpO2   (!) 115/59 (!) 125 20 99.7 °F (37.6 °C) 99 %      MAP       --         Physical Exam    Constitutional: She appears well-nourished.   HENT:   Head: Normocephalic.   Right Ear: External ear normal.   Left Ear: External ear normal.   Eyes: Pupils are equal, round, and reactive to light. Right eye exhibits no discharge. Left eye exhibits no discharge.   Neck: Neck supple.   Normal range of motion.  Cardiovascular:  Normal rate.           Pulmonary/Chest: Breath sounds normal. No respiratory distress. She has no wheezes.   Abdominal: Abdomen is soft. Bowel sounds are normal. She exhibits no distension.   Musculoskeletal:         General: Tenderness (patient has tenderness to sacral area on palpation today.) present.      Cervical back: Normal range of motion and neck supple.      Comments: Patient has limited range of motion to lower extremities from previous back issues.     Skin: Skin is warm.   Patient has wound to right heel.  Patient reports she is waiting to get home health wound care set up.   Psychiatric: She has a normal mood and affect.       ED Course   Procedures  Labs Reviewed - No data to display       Imaging Results              X-Ray Sacrum And Coccyx (Final result)  Result time 06/30/23 14:59:13      Final result by Jb Byrd Jr., MD (06/30/23 14:59:13)                   Impression:      Prior fixation of the lumbosacral spine including screws through each sacroiliac joint.  Lucency around the iliac end of the screws is consistent with motion.  Sclerosis around the sacroiliac joints is noted on both sides.      Electronically signed by: Jb Byrd  MD  Date:    06/30/2023  Time:    14:59               Narrative:    EXAMINATION:  XR SACRUM AND COCCYX    CLINICAL HISTORY:  Unspecified fall, initial encounter    TECHNIQUE:  Two or three views of the sacrum and coccyx were performed.    COMPARISON:  CT abdomen pelvis of June 11, 2023    FINDINGS:  The patient has had extensive fixation of the lumbar spine including of vertical rods and multiple pedicle screws.  Large screws extend through the sacrum into the ilium on both sides.  There is lucency around these screws in the ileum consistent with motion.  Sclerosis of the sacroiliac joints is noted on both sides.  A fracture is not identified.                                       CT Pelvis Without Contrast (Final result)  Result time 06/30/23 15:10:48      Final result by Jb Byrd Jr., MD (06/30/23 15:10:48)                   Impression:      An acute fracture of the sacrum or bones of the lumbosacral spine of the pelvis is not seen.  The patient has had extensive interbody fusion and posterior fixation throughout the lumbosacral spine.  Inferiorly there 2 large screws passing through the sacroiliac joints on each side.  There is lucency around the iliac end of each screw consistent with movement.  Sclerosis of the german on each side is consistent with chronic sacroiliitis.      Electronically signed by: Jb Byrd MD  Date:    06/30/2023  Time:    15:10               Narrative:    EXAMINATION:  CT PELVIS WITHOUT CONTRAST    CLINICAL HISTORY:  Sacral pain post mechanical fall;    TECHNIQUE:  Axial images are obtained through the lower lumbar spine and pelvis knee and displayed at soft tissue and bone windows.  Metal artifact reduction techniques were utilized.  Sagittal and coronal reconstructions are provided.    COMPARISON:  Plain x-rays of June 30, 2023    FINDINGS:  The patient has had prior interbody fusion cages placed at L3-4, L4-5 and L5-S1.  Vertical bars extend along the lumbar spine.   Large screws attached to the distal end of the rods extend through the sacroiliac joints on both sides to end in the ilium.  There is lucency around the distal end of each screw consistent with movement.  Pedicle screws are seen in L3, L4, and L5.  A single screw is noted entering the right ilium independently attached to a vertical bar along the right side of the spine.    There is mottled sclerosis identified of the german on each side SI joint.  An acute fracture of the sacrum or bones of the pelvis is not seen.  The hips are intact.                                       Medications   HYDROmorphone injection 2 mg (2 mg Intramuscular Given 6/30/23 1343)   ondansetron injection 4 mg (4 mg Intramuscular Given 6/30/23 1343)     Medical Decision Making:   Differential Diagnosis:   Coccyx fracture, sacral fracture, mechanical fall, medical noncompliance  Clinical Tests:   Radiological Study: Ordered and Reviewed  ED Management:  CT of pelvis/coccyx reveals the following findings per Radiology   An acute fracture of the sacrum or bones of the lumbosacral spine of the pelvis is not seen.  The patient has had extensive interbody fusion and posterior fixation throughout the lumbosacral spine.  Inferiorly there 2 large screws passing through the sacroiliac joints on each side.  There is lucency around the iliac end of each screw consistent with movement.  Sclerosis of the german on each side is consistent with chronic sacroiliitis.   Patient instructed to continue her current home pain medication regimen previously prescribed to her.    Patient stable at time of discharge in no acute distress.  No life-threatening illnesses were found during ER visit today.  Patient was instructed to follow-up with PCP or other recommended specialist within the next 48-72 hours.  Patient was instructed to return to ER immediately for any worsening or concerning symptoms.  All discharge instructions discussed with patient, and patient agrees to  comply with discharge instructions given today.                               Clinical Impression:   Final diagnoses:  [W19.XXXA] Fall  [S30.0XXA] Coccyx contusion, initial encounter (Primary)        ED Disposition Condition    Discharge Stable          ED Prescriptions    None       Follow-up Information       Follow up With Specialties Details Why Contact Info    Dannielle Merino,  Family Medicine Schedule an appointment as soon as possible for a visit in 3 days  [address] [address]  [phone]               Дмитрий Cisse NP  06/30/23 4704

## 2023-07-01 NOTE — ED NOTES
Contacted patient's friend, Marquez @ [phone], and notified him of need for ride home, he v/u.   States he will come to transport patient home.

## 2023-07-01 NOTE — ED NOTES
Patient states there is no one else to call for transport home at this time. Diaper and linens changed. Warm blankets provided for comfort. Patient repositioned and pillow support provided for ankles. She denies any needs/concerns at this time. Bed locked and lowered, side rails raised, call bell within reach.

## 2023-07-01 NOTE — DISCHARGE SUMMARY
DISCHARGE SUMMARY  Hospital Medicine    [facility]: [facility]    Patient Name: Rachel Zazueta  YOB: 1980    Admit Date: 6/13/2023    Discharge Date: 6/16/2023    Discharge Attending Physician: Katelin Tsang     Admitting Resident    Diagnoses:  Active Hospital Problems    Diagnosis  POA    *UTI (urinary tract infection) [N39.0]  Yes    Anxiety and depression [F41.9, F32.A]  Yes     Chronic    Debility [R53.81]  Yes    Chronic midline low back pain with sciatica [M54.40, G89.29]  Yes    Substance use disorder [F19.90]  Yes     Chronic    Chronic hepatitis C with cirrhosis [B18.2, K74.60]  Yes     Chronic    Cirrhosis of liver with ascites [K74.60, R18.8]  Yes     Chronic      Resolved Hospital Problems   No resolved problems to display.       Discharged Condition: admit problems have stabilized       HOSPITAL COURSE:      Initial Presentation:    43-year-old female with a history of HCV, IV drug use, Cirrhosis, Nephrolithiasis, Spinal fusion in 2021, Epidural abscess s/p hardware removal in 2022, and neurogenic bladder presents as a transfer from [facility] ED after she presented on 06/11 with worsening lower extremity edema, swelling and pain. Patient is wheelchair-bound. In addition, she also complains of chronic back pain and skin breakdown in her sacrum and lower extremities. She reports having taken a muscle relaxant and Suboxone at home with no relief. She denies any fever, chills, nausea, vomiting, hematemesis, cough, chest pain, palpitations, shortness of breath, abdominal pain/distension, changes in bowel or urinary habits, no hematochezia or melena. In the ED,she was noted have borderline low blood pressure and tachycardic on arrival. She received 2L of IV luids and started on Ceftriaxone. Patient was than transferred for further care     Of note she was previously admitted [facility] at [facility]February 27 with ESBL E coli bacteremia and thoracic diskitis s/p thoracic fusion. She had  significant ascites requiring several paracenteses and was then discharged to LTAC. The following month she presented to [facility] 3/13-4/1/2023 with worsening ascites and cirrhosis. She underwent TIPS on 3/23. At the time she had developed Anasarca and worsening pancytopenia.     Course of Principle Problem for Admission:    UTI (urinary tract infection)  Patient growing ESBL Klebsiella. Urine that has been collected no longer cloudy. CT AP: Left renal non-obstructing calculi; no evidence of Pyelonephritis  - ID consult - ertapenem.  Will treat for 3 days    Other Medical Problems Addressed in the Hospital:    Cirrhosis of liver with ascites  MELD-Na: 21 at 6/15/2023  3:35 AM  MELD: 16 at 6/15/2023  3:35 AM  Calculated from:  Serum Creatinine: 0.5 mg/dL (Using min of 1 mg/dL) at 6/15/2023  3:35 AM  Serum Sodium: 131 mmol/L at 6/15/2023  3:35 AM  Total Bilirubin: 2.4 mg/dL at 6/15/2023  3:35 AM  INR(ratio): 1.7 at 6/15/2023  3:35 AM  Majority of mild common from elevated bilirubin and INR.  Patient's renal functions normal.  - Lactulose 20 mg, TID  - continue Lasix 40 b.i.d. and start Aldactone 100 mg, daily  - CT A/P:  Cirrhosis with splenomegaly and upper abdominal varices.  There is a right tips stent. small amount of abdominal ascites seen within the left lower quadrant of the abdomen. Anasarca  - not a transplant candidate.   - stable       CONSULTS: hepatology     Last CBC/BMP/HgbA1c (if applicable):  No results found for this or any previous visit (from the past 336 hour(s)).  No results found for this or any previous visit (from the past 336 hour(s)).  Lab Results   Component Value Date    HGBA1C 4.6 04/16/2021       Disposition:   Home with Home Health       Future Scheduled Appointments:  Future Appointments   Date Time Provider Department Center   7/3/2023 11:20 AM [facility]annielle [facility]ino DO [facility]   7/12/2023  3:20 [facility]   8/23/2023  2:30 PM Era Cabral MD [facility]       Follow-up Plans from This Hospitalization:  Hepatology     Discharge Medication List:          Medication List        START taking these medications      lactulose 20 gram/30 mL Soln  Commonly known as: CHRONULAC  Take 30 mLs (20 g total) by mouth 3 (three) times daily.     spironolactone 100 MG tablet  Commonly known as: ALDACTONE  Take 1 tablet (100 mg total) by mouth once daily.     sulfamethoxazole-trimethoprim 800-160mg 800-160 mg Tab  Commonly known as: BACTRIM DS  Take 2 tablets by mouth 2 (two) times daily. First dose 6/16 in the evening.            CHANGE how you take these medications      folic acid 1 MG tablet  Commonly known as: FOLVITE  Take 1 tablet (1 mg total) by mouth once daily.  What changed:   when to take this  reasons to take this     melatonin 3 mg tablet  Commonly known as: MELATIN  Take 2 tablets (6 mg total) by mouth nightly as needed for Insomnia.  What changed:   how much to take  how to take this  when to take this  additional instructions            CONTINUE taking these medications      acetaminophen 500 MG tablet  Commonly known as: TYLENOL  Take 2 tablets (1,000 mg total) by mouth every 12 (twelve) hours.     bumetanide 1 MG tablet  Commonly known as: BUMEX  Take 1 tablet (1 mg total) by mouth once daily.     calcium carbonate 200 mg calcium (500 mg) chewable tablet  Commonly known as: TUMS  Take 2 tablets (1,000 mg total) by mouth 3 (three) times daily as needed.     DULoxetine 60 MG capsule  Commonly known as: CYMBALTA     gabapentin 300 MG capsule  Commonly known as: NEURONTIN  Take 3 capsules (900 mg total) by mouth 3 (three) times daily.     hydrOXYzine HCL 25 MG tablet  Commonly known as: ATARAX  Take 1 tablet (25 mg total) by mouth 3 (three) times daily as needed for Anxiety.     LIDOcaine 4 % Ptmd     ondansetron 4 MG tablet  Commonly known as: ZOFRAN     pantoprazole 40 MG tablet  Commonly known as:  PROTONIX  Take 1 tablet (40 mg total) by mouth once daily.     polyethylene glycol 17 gram Pwpk  Commonly known as: GLYCOLAX  Take 17 g by mouth daily as needed.            STOP taking these medications      oxyCODONE 10 mg Tab immediate release tablet  Commonly known as: ROXICODONE     oxyCODONE 5 MG immediate release tablet  Commonly known as: ROXICODONE     sodium chloride 0.9% SolP 100 mL with meropenem 1 gram SolR 2 g               Where to Get Your Medications        These medications were sent to [facility] [address], [place] [address]      Hours: Mon-Fri 7a-7p, Sat-Sun 10a-4p Phone: [phone]   spironolactone 100 MG tablet  sulfamethoxazole-trimethoprim 800-160mg 800-160 mg Tab       Information about where to get these medications is not yet available    Ask your nurse or doctor about these medications  lactulose 20 gram/30 mL Soln         Patient Instructions:  Discharge Procedure Orders   Ambulatory referral/consult to Hepatology   Standing Status: Future   Referral Priority: Routine Referral Type: Consultation   Referral Reason: Specialty Services Required   Requested Specialty: Hepatology   Number of Visits Requested: 1     Ambulatory referral/consult to Wound Clinic   Standing Status: Future   Referral Priority: Routine Referral Type: Consultation   Referral Reason: Specialty Services Required   Requested Specialty: Wound Care   Number of Visits Requested: 1     Ambulatory referral/consult to Outpatient Case Management   Referral Priority: Routine Referral Type: Consultation   Referral Reason: Specialty Services Required   Number of Visits Requested: 1       Signing Physician:  Katelin Tsang MD

## 2023-07-03 ENCOUNTER — TELEPHONE (OUTPATIENT)
Dept: PRIMARY CARE CLINIC | Facility: CLINIC | Age: 43
End: 2023-07-03
Payer: MEDICAID

## 2023-07-03 NOTE — TELEPHONE ENCOUNTER
Patient supposed to have a virtual visit today at 11:20.  It's 11:30 now and patient has not checked in for her visit yet.  I tried calling her to remind her but her phone number is going straight to voice mail.

## 2023-07-06 ENCOUNTER — HOSPITAL ENCOUNTER (INPATIENT)
Facility: HOSPITAL | Age: 43
LOS: 15 days | Discharge: NURSING, REHAB OR LONG-TERM CARE | DRG: 844 | End: 2023-07-21
Attending: SURGERY | Admitting: STUDENT IN AN ORGANIZED HEALTH CARE EDUCATION/TRAINING PROGRAM
Payer: MEDICAID

## 2023-07-06 DIAGNOSIS — Z87.19 HISTORY OF CIRRHOSIS: ICD-10-CM

## 2023-07-06 DIAGNOSIS — I95.9 HYPOTENSION, UNSPECIFIED HYPOTENSION TYPE: Primary | ICD-10-CM

## 2023-07-06 DIAGNOSIS — N39.0 UTI DUE TO KLEBSIELLA SPECIES: ICD-10-CM

## 2023-07-06 DIAGNOSIS — K74.60 CHRONIC HEPATITIS C WITH CIRRHOSIS: Chronic | ICD-10-CM

## 2023-07-06 DIAGNOSIS — N39.0 UTI (URINARY TRACT INFECTION): ICD-10-CM

## 2023-07-06 DIAGNOSIS — E66.01 SEVERE OBESITY (BMI >= 40): ICD-10-CM

## 2023-07-06 DIAGNOSIS — E87.1 HYPONATREMIA: ICD-10-CM

## 2023-07-06 DIAGNOSIS — E88.09 HYPOALBUMINEMIA: ICD-10-CM

## 2023-07-06 DIAGNOSIS — E86.0 DEHYDRATION: ICD-10-CM

## 2023-07-06 DIAGNOSIS — S91.301A NON-HEALING OPEN WOUND OF RIGHT HEEL: ICD-10-CM

## 2023-07-06 DIAGNOSIS — Z98.1 S/P SPINAL FUSION: ICD-10-CM

## 2023-07-06 DIAGNOSIS — B37.49 YEAST UTI: ICD-10-CM

## 2023-07-06 DIAGNOSIS — B18.2 CHRONIC HEPATITIS C WITH CIRRHOSIS: Chronic | ICD-10-CM

## 2023-07-06 DIAGNOSIS — R53.81 DEBILITY: ICD-10-CM

## 2023-07-06 DIAGNOSIS — F19.90 SUBSTANCE USE DISORDER: Chronic | ICD-10-CM

## 2023-07-06 DIAGNOSIS — M79.601 RIGHT ARM PAIN: ICD-10-CM

## 2023-07-06 DIAGNOSIS — F33.2 SEVERE EPISODE OF RECURRENT MAJOR DEPRESSIVE DISORDER, WITHOUT PSYCHOTIC FEATURES: ICD-10-CM

## 2023-07-06 DIAGNOSIS — B96.89 UTI DUE TO KLEBSIELLA SPECIES: ICD-10-CM

## 2023-07-06 DIAGNOSIS — T74.01XA ADULT NEGLECT, INITIAL ENCOUNTER: ICD-10-CM

## 2023-07-06 DIAGNOSIS — I95.9 HYPOTENSION: ICD-10-CM

## 2023-07-06 DIAGNOSIS — D63.8 ANEMIA OF CHRONIC DISEASE: ICD-10-CM

## 2023-07-06 DIAGNOSIS — S91.302A NON HEALING LEFT HEEL WOUND: ICD-10-CM

## 2023-07-06 DIAGNOSIS — R74.8 ELEVATED LIVER ENZYMES: ICD-10-CM

## 2023-07-06 LAB
ALBUMIN SERPL BCP-MCNC: 1.4 G/DL (ref 3.5–5.2)
ALP SERPL-CCNC: 138 U/L (ref 55–135)
ALT SERPL W/O P-5'-P-CCNC: 16 U/L (ref 10–44)
AMMONIA PLAS-SCNC: 68 UMOL/L (ref 10–50)
ANION GAP SERPL CALC-SCNC: 7 MMOL/L (ref 8–16)
AST SERPL-CCNC: 40 U/L (ref 10–40)
B-HCG UR QL: NEGATIVE
BACTERIA #/AREA URNS HPF: ABNORMAL /HPF
BASOPHILS # BLD AUTO: 0.03 K/UL (ref 0–0.2)
BASOPHILS NFR BLD: 0.7 % (ref 0–1.9)
BILIRUB SERPL-MCNC: 3.1 MG/DL (ref 0.1–1)
BILIRUB UR QL STRIP: ABNORMAL
BUN SERPL-MCNC: 8 MG/DL (ref 6–20)
CALCIUM SERPL-MCNC: 7.3 MG/DL (ref 8.7–10.5)
CAOX CRY URNS QL MICRO: ABNORMAL
CHLORIDE SERPL-SCNC: 101 MMOL/L (ref 95–110)
CK SERPL-CCNC: 60 U/L (ref 20–180)
CLARITY UR: ABNORMAL
CO2 SERPL-SCNC: 22 MMOL/L (ref 23–29)
COLOR UR: YELLOW
CREAT SERPL-MCNC: 0.6 MG/DL (ref 0.5–1.4)
DIFFERENTIAL METHOD: ABNORMAL
EOSINOPHIL # BLD AUTO: 0.1 K/UL (ref 0–0.5)
EOSINOPHIL NFR BLD: 1.4 % (ref 0–8)
ERYTHROCYTE [DISTWIDTH] IN BLOOD BY AUTOMATED COUNT: 19.6 % (ref 11.5–14.5)
EST. GFR  (NO RACE VARIABLE): >60 ML/MIN/1.73 M^2
GLUCOSE SERPL-MCNC: 115 MG/DL (ref 70–110)
GLUCOSE UR QL STRIP: NEGATIVE
HCT VFR BLD AUTO: 25.9 % (ref 37–48.5)
HGB BLD-MCNC: 7.7 G/DL (ref 12–16)
HGB UR QL STRIP: ABNORMAL
HYALINE CASTS #/AREA URNS LPF: 0 /LPF
IMM GRANULOCYTES # BLD AUTO: 0.1 K/UL (ref 0–0.04)
IMM GRANULOCYTES NFR BLD AUTO: 2.3 % (ref 0–0.5)
INR PPP: 1.8 (ref 0.8–1.2)
KETONES UR QL STRIP: NEGATIVE
LACTATE SERPL-SCNC: 1.5 MMOL/L (ref 0.5–2.2)
LEUKOCYTE ESTERASE UR QL STRIP: ABNORMAL
LYMPHOCYTES # BLD AUTO: 0.5 K/UL (ref 1–4.8)
LYMPHOCYTES NFR BLD: 10.7 % (ref 18–48)
MCH RBC QN AUTO: 30.6 PG (ref 27–31)
MCHC RBC AUTO-ENTMCNC: 29.7 G/DL (ref 32–36)
MCV RBC AUTO: 103 FL (ref 82–98)
MICROSCOPIC COMMENT: ABNORMAL
MONOCYTES # BLD AUTO: 0.4 K/UL (ref 0.3–1)
MONOCYTES NFR BLD: 8.7 % (ref 4–15)
NEUTROPHILS # BLD AUTO: 3.3 K/UL (ref 1.8–7.7)
NEUTROPHILS NFR BLD: 76.2 % (ref 38–73)
NITRITE UR QL STRIP: POSITIVE
NRBC BLD-RTO: 0 /100 WBC
PH UR STRIP: 6 [PH] (ref 5–8)
PLATELET # BLD AUTO: 81 K/UL (ref 150–450)
PMV BLD AUTO: 9 FL (ref 9.2–12.9)
POTASSIUM SERPL-SCNC: 3.5 MMOL/L (ref 3.5–5.1)
PROT SERPL-MCNC: 6.4 G/DL (ref 6–8.4)
PROT UR QL STRIP: ABNORMAL
PROTHROMBIN TIME: 18.2 SEC (ref 9–12.5)
RBC # BLD AUTO: 2.52 M/UL (ref 4–5.4)
RBC #/AREA URNS HPF: 0 /HPF (ref 0–4)
SODIUM SERPL-SCNC: 130 MMOL/L (ref 136–145)
SP GR UR STRIP: 1.02 (ref 1–1.03)
TROPONIN I SERPL DL<=0.01 NG/ML-MCNC: 0.01 NG/ML (ref 0–0.03)
URN SPEC COLLECT METH UR: ABNORMAL
UROBILINOGEN UR STRIP-ACNC: >=8 EU/DL
WBC # BLD AUTO: 4.38 K/UL (ref 3.9–12.7)
WBC #/AREA URNS HPF: 3 /HPF (ref 0–5)
YEAST URNS QL MICRO: ABNORMAL

## 2023-07-06 PROCEDURE — 93005 ELECTROCARDIOGRAM TRACING: CPT

## 2023-07-06 PROCEDURE — 87040 BLOOD CULTURE FOR BACTERIA: CPT | Mod: 59 | Performed by: SURGERY

## 2023-07-06 PROCEDURE — 81000 URINALYSIS NONAUTO W/SCOPE: CPT | Mod: 59 | Performed by: SURGERY

## 2023-07-06 PROCEDURE — 81025 URINE PREGNANCY TEST: CPT | Performed by: SURGERY

## 2023-07-06 PROCEDURE — 83880 ASSAY OF NATRIURETIC PEPTIDE: CPT | Performed by: SURGERY

## 2023-07-06 PROCEDURE — 51702 INSERT TEMP BLADDER CATH: CPT

## 2023-07-06 PROCEDURE — 99285 EMERGENCY DEPT VISIT HI MDM: CPT | Mod: 25

## 2023-07-06 PROCEDURE — 93010 EKG 12-LEAD: ICD-10-PCS | Mod: ,,, | Performed by: INTERNAL MEDICINE

## 2023-07-06 PROCEDURE — 80307 DRUG TEST PRSMV CHEM ANLYZR: CPT | Performed by: SURGERY

## 2023-07-06 PROCEDURE — 25000003 PHARM REV CODE 250: Performed by: SURGERY

## 2023-07-06 PROCEDURE — 36415 COLL VENOUS BLD VENIPUNCTURE: CPT | Performed by: SURGERY

## 2023-07-06 PROCEDURE — 82550 ASSAY OF CK (CPK): CPT | Performed by: SURGERY

## 2023-07-06 PROCEDURE — 80053 COMPREHEN METABOLIC PANEL: CPT | Performed by: SURGERY

## 2023-07-06 PROCEDURE — 85610 PROTHROMBIN TIME: CPT | Performed by: SURGERY

## 2023-07-06 PROCEDURE — 96365 THER/PROPH/DIAG IV INF INIT: CPT

## 2023-07-06 PROCEDURE — 84484 ASSAY OF TROPONIN QUANT: CPT | Performed by: SURGERY

## 2023-07-06 PROCEDURE — 93010 ELECTROCARDIOGRAM REPORT: CPT | Mod: ,,, | Performed by: INTERNAL MEDICINE

## 2023-07-06 PROCEDURE — 82140 ASSAY OF AMMONIA: CPT | Performed by: SURGERY

## 2023-07-06 PROCEDURE — 11000001 HC ACUTE MED/SURG PRIVATE ROOM

## 2023-07-06 PROCEDURE — 85025 COMPLETE CBC W/AUTO DIFF WBC: CPT | Performed by: SURGERY

## 2023-07-06 PROCEDURE — 85651 RBC SED RATE NONAUTOMATED: CPT | Performed by: SURGERY

## 2023-07-06 PROCEDURE — 96361 HYDRATE IV INFUSION ADD-ON: CPT

## 2023-07-06 PROCEDURE — 83605 ASSAY OF LACTIC ACID: CPT | Performed by: SURGERY

## 2023-07-06 RX ORDER — LACTULOSE 10 G/15ML
30 SOLUTION ORAL 3 TIMES DAILY
Status: DISCONTINUED | OUTPATIENT
Start: 2023-07-07 | End: 2023-07-11

## 2023-07-06 RX ORDER — MUPIROCIN 20 MG/G
OINTMENT TOPICAL
Status: COMPLETED | OUTPATIENT
Start: 2023-07-06 | End: 2023-07-06

## 2023-07-06 RX ORDER — FLUCONAZOLE 2 MG/ML
200 INJECTION, SOLUTION INTRAVENOUS
Status: COMPLETED | OUTPATIENT
Start: 2023-07-07 | End: 2023-07-07

## 2023-07-06 RX ADMIN — SODIUM CHLORIDE 1000 ML: 9 INJECTION, SOLUTION INTRAVENOUS at 10:07

## 2023-07-06 RX ADMIN — SODIUM CHLORIDE 1000 ML: 9 INJECTION, SOLUTION INTRAVENOUS at 11:07

## 2023-07-06 RX ADMIN — MUPIROCIN: 20 OINTMENT TOPICAL at 11:07

## 2023-07-06 NOTE — TELEPHONE ENCOUNTER
Patient called requesting refill of Gabapentin 300mg. Her chart shows she had it prescribed for a years worth of prescriptions in January from a  In [place] when she was in the hospital, but the pharmacy accidentally cancelled it so she is out of the medication now.     Prescription will need to be sent to [facility] in [place] since the patient just moved there.

## 2023-07-07 PROBLEM — M79.601 RIGHT ARM PAIN: Status: ACTIVE | Noted: 2023-07-07

## 2023-07-07 PROBLEM — E88.09 HYPOALBUMINEMIA: Status: ACTIVE | Noted: 2023-07-07

## 2023-07-07 PROBLEM — T07.XXXA WOUNDS, MULTIPLE: Status: ACTIVE | Noted: 2023-07-07

## 2023-07-07 LAB
ALBUMIN SERPL BCP-MCNC: 1.4 G/DL (ref 3.5–5.2)
ALP SERPL-CCNC: 137 U/L (ref 55–135)
ALT SERPL W/O P-5'-P-CCNC: 16 U/L (ref 10–44)
AMPHET+METHAMPHET UR QL: ABNORMAL
ANION GAP SERPL CALC-SCNC: 7 MMOL/L (ref 8–16)
AST SERPL-CCNC: 39 U/L (ref 10–40)
BARBITURATES UR QL SCN>200 NG/ML: NEGATIVE
BASOPHILS # BLD AUTO: 0.02 K/UL (ref 0–0.2)
BASOPHILS NFR BLD: 0.6 % (ref 0–1.9)
BENZODIAZ UR QL SCN>200 NG/ML: NEGATIVE
BILIRUB SERPL-MCNC: 3.2 MG/DL (ref 0.1–1)
BNP SERPL-MCNC: 270 PG/ML (ref 0–99)
BUN SERPL-MCNC: 7 MG/DL (ref 6–20)
BZE UR QL SCN: NEGATIVE
CALCIUM SERPL-MCNC: 7.2 MG/DL (ref 8.7–10.5)
CANNABINOIDS UR QL SCN: NEGATIVE
CHLORIDE SERPL-SCNC: 103 MMOL/L (ref 95–110)
CO2 SERPL-SCNC: 21 MMOL/L (ref 23–29)
CREAT SERPL-MCNC: 0.5 MG/DL (ref 0.5–1.4)
CREAT UR-MCNC: 208.2 MG/DL (ref 15–325)
DIFFERENTIAL METHOD: ABNORMAL
EOSINOPHIL # BLD AUTO: 0.1 K/UL (ref 0–0.5)
EOSINOPHIL NFR BLD: 2.8 % (ref 0–8)
ERYTHROCYTE [DISTWIDTH] IN BLOOD BY AUTOMATED COUNT: 19.5 % (ref 11.5–14.5)
ERYTHROCYTE [SEDIMENTATION RATE] IN BLOOD BY WESTERGREN METHOD: 20 MM/HR (ref 0–20)
EST. GFR  (NO RACE VARIABLE): >60 ML/MIN/1.73 M^2
GLUCOSE SERPL-MCNC: 111 MG/DL (ref 70–110)
HCT VFR BLD AUTO: 24.8 % (ref 37–48.5)
HGB BLD-MCNC: 7.7 G/DL (ref 12–16)
IMM GRANULOCYTES # BLD AUTO: 0.05 K/UL (ref 0–0.04)
IMM GRANULOCYTES NFR BLD AUTO: 1.4 % (ref 0–0.5)
LYMPHOCYTES # BLD AUTO: 0.4 K/UL (ref 1–4.8)
LYMPHOCYTES NFR BLD: 11.9 % (ref 18–48)
MAGNESIUM SERPL-MCNC: 1.7 MG/DL (ref 1.6–2.6)
MCH RBC QN AUTO: 30.4 PG (ref 27–31)
MCHC RBC AUTO-ENTMCNC: 31 G/DL (ref 32–36)
MCV RBC AUTO: 98 FL (ref 82–98)
METHADONE UR QL SCN>300 NG/ML: NEGATIVE
MONOCYTES # BLD AUTO: 0.3 K/UL (ref 0.3–1)
MONOCYTES NFR BLD: 8.8 % (ref 4–15)
NEUTROPHILS # BLD AUTO: 2.6 K/UL (ref 1.8–7.7)
NEUTROPHILS NFR BLD: 74.5 % (ref 38–73)
NRBC BLD-RTO: 0 /100 WBC
OPIATES UR QL SCN: NEGATIVE
PCP UR QL SCN>25 NG/ML: NEGATIVE
PLATELET # BLD AUTO: 76 K/UL (ref 150–450)
PMV BLD AUTO: 8.8 FL (ref 9.2–12.9)
POTASSIUM SERPL-SCNC: 4 MMOL/L (ref 3.5–5.1)
PROT SERPL-MCNC: 6.3 G/DL (ref 6–8.4)
RBC # BLD AUTO: 2.53 M/UL (ref 4–5.4)
SODIUM SERPL-SCNC: 131 MMOL/L (ref 136–145)
TOXICOLOGY INFORMATION: ABNORMAL
WBC # BLD AUTO: 3.53 K/UL (ref 3.9–12.7)

## 2023-07-07 PROCEDURE — 11000001 HC ACUTE MED/SURG PRIVATE ROOM

## 2023-07-07 PROCEDURE — 25000003 PHARM REV CODE 250: Performed by: FAMILY MEDICINE

## 2023-07-07 PROCEDURE — 25000003 PHARM REV CODE 250: Performed by: SURGERY

## 2023-07-07 PROCEDURE — 85025 COMPLETE CBC W/AUTO DIFF WBC: CPT | Performed by: PHYSICIAN ASSISTANT

## 2023-07-07 PROCEDURE — 36415 COLL VENOUS BLD VENIPUNCTURE: CPT | Performed by: PHYSICIAN ASSISTANT

## 2023-07-07 PROCEDURE — 63600175 PHARM REV CODE 636 W HCPCS: Performed by: SURGERY

## 2023-07-07 PROCEDURE — 99223 PR INITIAL HOSPITAL CARE,LEVL III: ICD-10-PCS | Mod: ,,, | Performed by: PHYSICIAN ASSISTANT

## 2023-07-07 PROCEDURE — 99223 1ST HOSP IP/OBS HIGH 75: CPT | Mod: ,,, | Performed by: PHYSICIAN ASSISTANT

## 2023-07-07 PROCEDURE — 97530 THERAPEUTIC ACTIVITIES: CPT

## 2023-07-07 PROCEDURE — 83735 ASSAY OF MAGNESIUM: CPT | Performed by: PHYSICIAN ASSISTANT

## 2023-07-07 PROCEDURE — 27000207 HC ISOLATION

## 2023-07-07 PROCEDURE — 80053 COMPREHEN METABOLIC PANEL: CPT | Performed by: PHYSICIAN ASSISTANT

## 2023-07-07 PROCEDURE — 97162 PT EVAL MOD COMPLEX 30 MIN: CPT

## 2023-07-07 PROCEDURE — 99222 1ST HOSP IP/OBS MODERATE 55: CPT | Mod: ,,, | Performed by: SURGERY

## 2023-07-07 PROCEDURE — 63600175 PHARM REV CODE 636 W HCPCS: Performed by: FAMILY MEDICINE

## 2023-07-07 PROCEDURE — 99222 PR INITIAL HOSPITAL CARE,LEVL II: ICD-10-PCS | Mod: ,,, | Performed by: SURGERY

## 2023-07-07 PROCEDURE — 25000003 PHARM REV CODE 250: Performed by: PHYSICIAN ASSISTANT

## 2023-07-07 PROCEDURE — 94761 N-INVAS EAR/PLS OXIMETRY MLT: CPT

## 2023-07-07 RX ORDER — BUPRENORPHINE HYDROCHLORIDE AND NALOXONE HYDROCHLORIDE DIHYDRATE 8; 2 MG/1; MG/1
8 TABLET SUBLINGUAL 2 TIMES DAILY
Status: DISCONTINUED | OUTPATIENT
Start: 2023-07-07 | End: 2023-07-11

## 2023-07-07 RX ORDER — OXYCODONE HYDROCHLORIDE 5 MG/1
5 TABLET ORAL
Status: ON HOLD | COMMUNITY
Start: 2023-07-04 | End: 2023-07-21 | Stop reason: HOSPADM

## 2023-07-07 RX ORDER — GABAPENTIN 300 MG/1
300 CAPSULE ORAL 3 TIMES DAILY
Status: DISCONTINUED | OUTPATIENT
Start: 2023-07-08 | End: 2023-07-11

## 2023-07-07 RX ORDER — GABAPENTIN 300 MG/1
900 CAPSULE ORAL 3 TIMES DAILY
Qty: 90 CAPSULE | Refills: 8
Start: 2023-07-07 | End: 2023-07-21

## 2023-07-07 RX ORDER — TALC
6 POWDER (GRAM) TOPICAL NIGHTLY PRN
Status: DISCONTINUED | OUTPATIENT
Start: 2023-07-07 | End: 2023-07-21 | Stop reason: HOSPADM

## 2023-07-07 RX ORDER — ONDANSETRON 2 MG/ML
4 INJECTION INTRAMUSCULAR; INTRAVENOUS EVERY 8 HOURS PRN
Status: DISCONTINUED | OUTPATIENT
Start: 2023-07-07 | End: 2023-07-21 | Stop reason: HOSPADM

## 2023-07-07 RX ORDER — BUPRENORPHINE AND NALOXONE 8; 2 MG/1; MG/1
FILM, SOLUBLE BUCCAL; SUBLINGUAL 2 TIMES DAILY
Status: ON HOLD | COMMUNITY
End: 2023-07-21 | Stop reason: HOSPADM

## 2023-07-07 RX ORDER — PANTOPRAZOLE SODIUM 40 MG/1
40 TABLET, DELAYED RELEASE ORAL DAILY
Status: DISCONTINUED | OUTPATIENT
Start: 2023-07-07 | End: 2023-07-21 | Stop reason: HOSPADM

## 2023-07-07 RX ORDER — HYDROXYZINE PAMOATE 25 MG/1
25 CAPSULE ORAL 2 TIMES DAILY PRN
Status: ON HOLD | COMMUNITY
Start: 2023-04-25 | End: 2023-07-21 | Stop reason: HOSPADM

## 2023-07-07 RX ORDER — GABAPENTIN 300 MG/1
900 CAPSULE ORAL 3 TIMES DAILY
Status: DISCONTINUED | OUTPATIENT
Start: 2023-07-07 | End: 2023-07-07

## 2023-07-07 RX ORDER — SODIUM CHLORIDE 0.9 % (FLUSH) 0.9 %
10 SYRINGE (ML) INJECTION
Status: DISCONTINUED | OUTPATIENT
Start: 2023-07-07 | End: 2023-07-21 | Stop reason: HOSPADM

## 2023-07-07 RX ORDER — ACETAMINOPHEN 500 MG
500 TABLET ORAL EVERY 6 HOURS PRN
Status: DISCONTINUED | OUTPATIENT
Start: 2023-07-07 | End: 2023-07-21 | Stop reason: HOSPADM

## 2023-07-07 RX ORDER — SODIUM CHLORIDE 9 MG/ML
INJECTION, SOLUTION INTRAVENOUS CONTINUOUS
Status: DISCONTINUED | OUTPATIENT
Start: 2023-07-07 | End: 2023-07-07

## 2023-07-07 RX ORDER — GABAPENTIN 300 MG/1
300 CAPSULE ORAL 3 TIMES DAILY
Status: DISCONTINUED | OUTPATIENT
Start: 2023-07-07 | End: 2023-07-07

## 2023-07-07 RX ORDER — TIZANIDINE 2 MG/1
4 TABLET ORAL 3 TIMES DAILY PRN
Status: DISCONTINUED | OUTPATIENT
Start: 2023-07-07 | End: 2023-07-11

## 2023-07-07 RX ORDER — SPIRONOLACTONE 25 MG/1
100 TABLET ORAL DAILY
Status: DISCONTINUED | OUTPATIENT
Start: 2023-07-07 | End: 2023-07-07

## 2023-07-07 RX ADMIN — SODIUM CHLORIDE: 9 INJECTION, SOLUTION INTRAVENOUS at 03:07

## 2023-07-07 RX ADMIN — TIZANIDINE 4 MG: 2 TABLET ORAL at 12:07

## 2023-07-07 RX ADMIN — LACTULOSE 30 G: 20 SOLUTION ORAL at 03:07

## 2023-07-07 RX ADMIN — COLLAGENASE SANTYL: 250 OINTMENT TOPICAL at 05:07

## 2023-07-07 RX ADMIN — LACTULOSE 30 G: 20 SOLUTION ORAL at 08:07

## 2023-07-07 RX ADMIN — FLUCONAZOLE 200 MG: 2 INJECTION, SOLUTION INTRAVENOUS at 02:07

## 2023-07-07 RX ADMIN — PANTOPRAZOLE SODIUM 40 MG: 40 TABLET, DELAYED RELEASE ORAL at 08:07

## 2023-07-07 RX ADMIN — MEROPENEM 2 G: 1 INJECTION, POWDER, FOR SOLUTION INTRAVENOUS at 09:07

## 2023-07-07 RX ADMIN — ACETAMINOPHEN 500 MG: 500 TABLET ORAL at 05:07

## 2023-07-07 RX ADMIN — GABAPENTIN 900 MG: 300 CAPSULE ORAL at 08:07

## 2023-07-07 RX ADMIN — BUPRENORPHINE AND NALOXONE 8 MG: 8; 2 TABLET SUBLINGUAL at 08:07

## 2023-07-07 RX ADMIN — MEROPENEM 2 G: 1 INJECTION, POWDER, FOR SOLUTION INTRAVENOUS at 05:07

## 2023-07-07 RX ADMIN — LACTULOSE 30 G: 20 SOLUTION ORAL at 12:07

## 2023-07-07 RX ADMIN — TIZANIDINE 4 MG: 2 TABLET ORAL at 01:07

## 2023-07-07 RX ADMIN — SODIUM CHLORIDE: 9 INJECTION, SOLUTION INTRAVENOUS at 12:07

## 2023-07-07 RX ADMIN — MEROPENEM 2 G: 1 INJECTION, POWDER, FOR SOLUTION INTRAVENOUS at 12:07
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
## 2023-07-07 NOTE — PLAN OF CARE
Wound care performed. Photos of wounds added into media.     Problem: Skin Injury Risk Increased  Goal: Skin Health and Integrity  Outcome: Ongoing, Progressing     Problem: Impaired Wound Healing  Goal: Optimal Wound Healing  Outcome: Ongoing, Progressing     Problem: Bariatric Environmental Safety  Goal: Safety Maintained with Care  Outcome: Ongoing, Progressing
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
## 2023-07-07 NOTE — PLAN OF CARE
Recommendations  1. Rec'd continue low sodium diet.   2. Rec'd Srinivasa BID. Please mix into 8-10 oz of water, sprite or juice to encourage consumption and promote wound healing.   3. Rec'd Boost Plus if PO intake inadequate.   4. RD to follow and make rec's accordingly.    Goals: Pt will meet at least 75% ENN by RD follow up.  Nutrition Goal Status: new

## 2023-07-07 NOTE — PT/OT/SLP EVAL
Physical Therapy Evaluation    Patient Name:  Rachel Zazueta   MRN:  [id number]    Recommendations:     Discharge Recommendations: LTACH (long-term acute care hospital), nursing facility, basic, nursing facility, skilled   Discharge Equipment Recommendations: none   Barriers to discharge: Inaccessible home and Decreased caregiver support    Assessment:     Rachel Zazueta is a 42 y.o. female admitted with a medical diagnosis of <principal problem not specified>.  She presents with the following impairments/functional limitations: weakness, impaired endurance, impaired self care skills, impaired functional mobility, gait instability, impaired balance, decreased coordination, decreased upper extremity function, decreased lower extremity function, decreased safety awareness, pain, decreased ROM, impaired skin, impaired joint extensibility, impaired muscle length. Patient noted has been bed bound since 12/2002 resulting pressure sores with eschar on posterior heels on bilateral feet. Difficulty to roll to sides with requires dependent assistance and inability to sit up due to pain bilateral legs/feet, lower back and right shoulder.    Rehab Prognosis: Fair; patient would benefit from acute skilled PT services to address these deficits and reach maximum level of function.    Recent Surgery: * No surgery found *      Plan:     During this hospitalization, patient to be seen 5 x/week to address the identified rehab impairments via therapeutic activities, therapeutic exercises and progress toward the following goals:    Plan of Care Expires:  07/14/23    Subjective     Chief Complaint: pain on bilateral legs/feet, lower back and right shoulder  Patient/Family Comments/goals: to get better and find a place where she can get help - per patient  Pain/Comfort:  Pain Rating 1: 10/10  Location - Side 1: Bilateral  Location - Orientation 1: lower  Location 1: other (see comments) (leg, feet and right shoulder)  Pain Addressed  1: Reposition, Cessation of Activity  Pain Rating Post-Intervention 1: 10/10  Pain Rating 2: 10/10  Location - Side 2: Bilateral  Location - Orientation 2: lower  Location 2: back  Pain Addressed 2: Cessation of Activity, Reposition  Pain Rating Post-Intervention 2: 10/10    Patients cultural, spiritual, Faith conflicts given the current situation: no    Living Environment:  Patient lives in her best friend house with no JUNO.   Prior to admission, patients level of function was bed bound and has/had not been walking since 12/2002. Assistance provided by her friend but currently her friend unable to provide the care she needs. Equipment used at home: bedside commode, hospital bed, lift device, slide board, walker, rolling, rollator, cane, straight.  DME owned (not currently used): none.  Upon discharge, patient will have assistance from none at this time.    Objective:     Communicated with patient prior to session.  Patient found supine with gu catheter, peripheral IV  upon PT entry to room.    General Precautions: Standard, contact, fall  Orthopedic Precautions:N/A   Braces: N/A  Respiratory Status: Room air    Exams:  Cognitive Exam:  Patient is oriented to Person, Place, Time, and Situation  Fine Motor Coordination:    -       Intact  Gross Motor Coordination:  limited with pain  Skin Integrity/Edema:      -       decubitus ulcers with black eschar on bilateral posterior heels  RUE ROM: WFL except right shoulder limited with pain  RUE Strength: WFL except right shoulder decreased due to pain  LUE ROM: WFL  LUE Strength: WFL  RLE ROM: limited with pain  RLE Strength: decrease with pain  LLE ROM: limited with pain  LLE Strength: decrease with pain    Functional Mobility:  Bed Mobility:     Rolling Left:  dependence  Rolling Right: dependence  Supine to Sit: unable due to pain  Transfers:  unable  Gait: unable      AM-PAC 6 CLICK MOBILITY  Total Score:7       Treatment & Education:  PT eval. Educated  patient the role of PT and initiated ROM ex and rolling to sides with limitations due to pain; bed repositioning    Patient left supine with all lines intact, call button in reach, and nursing notified.    GOALS:   Multidisciplinary Problems       Physical Therapy Goals          Problem: Physical Therapy    Goal Priority Disciplines Outcome Goal Variances Interventions   Physical Therapy Goal     PT, PT/OT      Description:   Patient will increase functional independence with mobility by performin. Pt able to perform and tolerate B LE ROM ex x 10 reps on each plane to inc mobility, inc body repositioning  and prevent in declining in functions.  2. Inc rolling to sides to minimal assistance and cues  3. Able to perform and tolerate  supine <>sit with Moderate Assistance and cues.  4. Able to perform and tolerate static sit at edge of the bed x ~10 minutes for inc participation and performance with self care activities.  5. Establish home ex program.                          History:     Past Medical History:   Diagnosis Date    Anemia     Anxiety     Depressed     Diskitis     Hep C w/o coma, chronic     IV drug abuse     Kidney stone     Liver cirrhosis        Past Surgical History:   Procedure Laterality Date    ARTHROTOMY OF SHOULDER Left 11/15/2022    Procedure: ARTHROTOMY, SHOULDER;  Surgeon: Bjorn Hayes MD;  Location: [facility];  Service: Orthopedics;  Laterality: Left;  Left acromioclavicular joint arthrotomy    BACK SURGERY      benine tumor removal      forehead, age 9    CHOLECYSTECTOMY      KIDNEY SURGERY      LUMBAR FUSION Bilateral 3/2/2022    Procedure: FUSION, SPINE, LUMBAR;  Surgeon: Guille Templeton MD;  Location: [address];  Service: Neurosurgery;  Laterality: Bilateral;  AIRO  T10--Pelvis    LUMBAR FUSION N/A 2023    Procedure: **AIRO** T8-T12 POSTERIOR FUSION, T8-T10 LAMINECTOMY, HARDWARE REMOVAL AND WASHOUT;  Surgeon: Guille Templeton MD;  Location: [facility] OR [address] [place] [address];  Service:  Neurosurgery;  Laterality: N/A;    REMOVAL OF HARDWARE FROM SPINE N/A 2/21/2022    Procedure: REMOVAL, HARDWARE, SPINE;  Surgeon: Guille Templeton MD;  Location: [facility] OR [address] [place] [address];  Service: Neurosurgery;  Laterality: N/A;  Washout    SPINE SURGERY      THORACIC LAMINECTOMY WITH FUSION N/A 2/2/2023    Procedure: LAMINECTOMY, SPINE, THORACIC, WITH FUSION (T4-Pelvis fusion w/ T9-10 corpectomies) **AIRO;  Surgeon: Guille Templeton MD;  Location: [facility] OR [address] [place] [address];  Service: Neurosurgery;  Laterality: N/A;  AIRO, 2 bovies, aquamantys, Globus, Depuy, antibiotic beads, TXA, Peroxide; Babycos plastics closure       Time Tracking:     PT Received On: 07/07/23  PT Start Time: 0845     PT Stop Time: 0915  PT Total Time (min): 30 min     Billable Minutes: Evaluation 15 and Therapeutic Activity 15      07/07/2023

## 2023-07-07 NOTE — ASSESSMENT & PLAN NOTE
Right arm pain and decreased ROM  XRAY elbow, should and AP cervical spine pending  Continue home gabapentin

## 2023-07-07 NOTE — NURSING
Latisha RN reports she let Brigida know : who took phone call for Dr. Quijano who currently is in surgery about new consult. Informed them to call unit for consult info.
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
## 2023-07-07 NOTE — CONSULTS
[facility] - Med Surg (3rd Fl)  Adult Nutrition  Consult Note    SUMMARY     Recommendations  1. Rec'd continue low sodium diet.   2. Rec'd Srinivasa BID. Please mix into 8-10 oz of water, sprite or juice to encourage consumption and promote wound healing.   3. Rec'd Boost Plus if PO intake inadequate.   4. RD to follow and make rec's accordingly.    Goals: Pt will meet at least 75% ENN by RD follow up.  Nutrition Goal Status: new  Communication of RD Recs: other (comment) (POC)    Assessment and Plan    Nutrition Problem  Increased protein and energy needs    Related to (etiology):   Altered skin integrity and other conditions requiring increased nutritional needs    Signs and Symptoms (as evidenced by):   Bilateral wounds to heel, sacrum; bed bound status, liver cirrhosis, UDS + for amphetamines and h/o substance abuse    Interventions/Recommendations (treatment strategy):  Interventions:  1. Collaboration with other medical providers  2. Commercial beverage    Recommendations:  1. Rec'd continue low sodium diet.   2. Rec'd Srinivasa BID. Please mix into 8-10 oz of water, sprite or juice to encourage consumption and promote wound healing.   3. Rec'd Boost Plus if PO intake inadequate.   4. RD to follow and make rec's accordingly.    Goals: Pt will meet at least 75% ENN by RD follow up.  Nutrition Goal Status: new      Nutrition Diagnosis Status:   New           Malnutrition Assessment                                       Reason for Assessment    Reason For Assessment: consult (hypoalbuminemia)  Diagnosis: other (see comments) (no active principal problem)  Interdisciplinary Rounds: did not attend  General Information Comments: 43 yo F currently on low sodium diet. Pt is a new admit with no documented PO intake. Pt is bedbound and has multiple pressure injuries. Prince Score 15. Weight hx per chart review shows dramatic fluctuations in weight -- pt unsure if she was lost weight recently. Noted +3 edema present to legs.  "LBM 7/7/2023. Will continue to monitor for any nutrition related changes and perform NFPE at follow up.  Nutrition Discharge Planning: pending hospital course    Nutrition Risk Screen    Nutrition Risk Screen: large or nonhealing wound, burn or pressure injury    Nutrition/Diet History    Spiritual, Cultural Beliefs, Yazidi Practices, Values that Affect Care: no  Food Allergies: shellfish, tree nut, other (see comments) (iodine)  Factors Affecting Nutritional Intake: None identified at this time    Anthropometrics    Temp: 97.1 °F (36.2 °C)  Height Method: Stated  Height: 5' 7" (170.2 cm)  Height (inches): 67 in  Weight Method: Bed Scale  Weight: 118.9 kg (262 lb 2 oz)  Weight (lb): 262.13 lb  Ideal Body Weight (IBW), Female: 135 lb  % Ideal Body Weight, Female (lb): 194.17 %  BMI (Calculated): 41  BMI Grade: greater than 40 - morbid obesity       Lab/Procedures/Meds    Pertinent Labs Reviewed: reviewed  Pertinent Labs Comments: UDS + for amphetamines  Lab Results   Component Value Date    WBC 3.53 (L) 07/07/2023    RBC 2.53 (L) 07/07/2023    HGB 7.7 (L) 07/07/2023    HCT 24.8 (L) 07/07/2023    MCV 98 07/07/2023    MCH 30.4 07/07/2023    MCHC 31.0 (L) 07/07/2023    RDW 19.5 (H) 07/07/2023     Lab Results   Component Value Date     (L) 07/07/2023    K 4.0 07/07/2023     07/07/2023    CO2 21 (L) 07/07/2023    BUN 7 07/07/2023    CREATININE 0.5 07/07/2023    CALCIUM 7.2 (L) 07/07/2023    ANIONGAP 7 (L) 07/07/2023    EGFRNORACEVR >60 07/07/2023     Pertinent Medications Reviewed: reviewed  Pertinent Medications Comments: abx, lactulose    Physical Findings/Assessment         Estimated/Assessed Needs    Weight Used For Calorie Calculations: 61.4 kg (135 lb 5.8 oz) (IBW)  Energy Calorie Requirements (kcal): 6337-7148  Energy Need Method: Kcal/kg (25-30 kcal/kg for wounds with obesity, cirrhosis, and substance abuse)  Protein Requirements: 77-92 g (1.25-1.5 g/kg/d IBW for wounds, substance abuse and " cirrhosis)  Weight Used For Protein Calculations: 61.4 kg (135 lb 5.8 oz) (IBW)  Fluid Requirements (mL): 8593-3420  Estimated Fluid Requirement Method: RDA Method (or per MD rec's)  RDA Method (mL): 1535         Nutrition Prescription Ordered    Current Diet Order: low sodium  Oral Nutrition Supplement: Srinivasa BID    Evaluation of Received Nutrient/Fluid Intake    I/O: -200  Energy Calories Required: other (see comments)  Protein Required: other (see comments)  Fluid Required: other (see comments)  Comments: TRINITY -- new admit  Tolerance: other (see comments)  % Intake of Estimated Energy Needs: Other: TRINITY  % Meal Intake: Other: TRINITY    Nutrition Risk    Level of Risk/Frequency of Follow-up: moderate - high       Monitor and Evaluation    Food and Nutrient Intake: energy intake, food and beverage intake, enteral nutrition intake  Food and Nutrient Adminstration: diet order  Knowledge/Beliefs/Attitudes: food and nutrition knowledge/skill, beliefs and attitudes  Physical Activity and Function: nutrition-related ADLs and IADLs, factors affecting access to physical activity  Anthropometric Measurements: height/length, weight, weight change, body mass index  Biochemical Data, Medical Tests and Procedures: electrolyte and renal panel, gastrointestinal profile, glucose/endocrine profile, inflammatory profile, lipid profile  Nutrition-Focused Physical Findings: overall appearance       Nutrition Follow-Up    RD Follow-up?: Yes
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
## 2023-07-07 NOTE — SUBJECTIVE & OBJECTIVE
Past Medical History:   Diagnosis Date    Anemia     Anxiety     Depressed     Diskitis     Hep C w/o coma, chronic     IV drug abuse     Kidney stone     Liver cirrhosis        Past Surgical History:   Procedure Laterality Date    ARTHROTOMY OF SHOULDER Left 11/15/2022    Procedure: ARTHROTOMY, SHOULDER;  Surgeon: Bjorn Hayes MD;  Location: [facility];  Service: Orthopedics;  Laterality: Left;  Left acromioclavicular joint arthrotomy    BACK SURGERY      benine tumor removal      forehead, age 9    CHOLECYSTECTOMY      KIDNEY SURGERY      LUMBAR FUSION Bilateral 3/2/2022    Procedure: FUSION, SPINE, LUMBAR;  Surgeon: Guille Templeton MD;  Location: [address];  Service: Neurosurgery;  Laterality: Bilateral;  AIRO  T10--Pelvis    LUMBAR FUSION N/A 1/24/2023    Procedure: **AIRO** T8-T12 POSTERIOR FUSION, T8-T10 LAMINECTOMY, HARDWARE REMOVAL AND WASHOUT;  Surgeon: Guille Templeton MD;  Location: [address];  Service: Neurosurgery;  Laterality: N/A;    REMOVAL OF HARDWARE FROM SPINE N/A 2/21/2022    Procedure: REMOVAL, HARDWARE, SPINE;  Surgeon: Guille Templeton MD;  Location: [address];  Service: Neurosurgery;  Laterality: N/A;  [place]    SPINE SURGERY      THORACIC LAMINECTOMY WITH FUSION N/A 2/2/2023    Procedure: LAMINECTOMY, SPINE, THORACIC, WITH FUSION (T4-Pelvis fusion w/ T9-10 corpectomies) **AIRO;  Surgeon: Guille Templeton MD;  Location: [address];  Service: Neurosurgery;  Laterality: N/A;  AIRO, 2 bovies, aquamantys, Globus, Depuy, antibiotic beads, TXA, Peroxide; [employer] plastics closure       Review of patient's allergies indicates:   Allergen Reactions    Bee venom protein (honey bee) Anaphylaxis     Patient reports she is allergic to bee stings.    Naproxen Anaphylaxis     Throat closing    12-23- Patient reports taking Ibuprofen 200 mg at home without problems. Verified X3. [place]    Wasp sting [allergen ext-venom-honey bee] Anaphylaxis    Adhesive Blisters    Shellfish containing products      Iodine and iodide containing products Hives and Itching     Allergic to iodine in seafood only    Nuts [tree nut] Rash       No current facility-administered medications on file prior to encounter.     Current Outpatient Medications on File Prior to Encounter   Medication Sig    bumetanide (BUMEX) 1 MG tablet Take 1 tablet (1 mg total) by mouth once daily.    buprenorphine-naloxone 8-2 mg (SUBOXONE) 8-2 mg Place under the tongue 2 (two) times a day.    DULoxetine (CYMBALTA) 60 MG capsule Take 60 mg by mouth once daily.    gabapentin (NEURONTIN) 300 MG capsule Take 3 capsules (900 mg total) by mouth 3 (three) times daily.    hydrOXYzine HCL (ATARAX) 25 MG tablet Take 1 tablet (25 mg total) by mouth 3 (three) times daily as needed for Anxiety.    hydrOXYzine pamoate (VISTARIL) 25 MG Cap Take 25 mg by mouth 2 (two) times daily as needed.    lactulose (CHRONULAC) 20 gram/30 mL Soln Take 30 mLs (20 g total) by mouth 3 (three) times daily.    melatonin (MELATIN) 3 mg tablet Take 2 tablets (6 mg total) by mouth nightly as needed for Insomnia.    pantoprazole (PROTONIX) 40 MG tablet Take 1 tablet (40 mg total) by mouth once daily.    spironolactone (ALDACTONE) 100 MG tablet Take 1 tablet (100 mg total) by mouth once daily.    tiZANidine (ZANAFLEX) 4 MG tablet Take 1 tablet (4 mg total) by mouth 3 (three) times daily as needed (Pain/muscle spasms).    acetaminophen (TYLENOL) 500 MG tablet Take 2 tablets (1,000 mg total) by mouth every 12 (twelve) hours. (Patient not taking: Reported on 6/22/2023)    calcium carbonate (TUMS) 200 mg calcium (500 mg) chewable tablet Take 2 tablets (1,000 mg total) by mouth 3 (three) times daily as needed.    folic acid (FOLVITE) 1 MG tablet Take 1 tablet (1 mg total) by mouth once daily. (Patient taking differently: Take 1 mg by mouth daily as needed (vitamin supplement).)    ondansetron (ZOFRAN) 4 MG tablet Take 8 mg by mouth 2 (two) times daily as needed for Nausea.    oxyCODONE  (ROXICODONE) 5 MG immediate release tablet Take 5 mg by mouth.    polyethylene glycol (GLYCOLAX) 17 gram PwPk Take 17 g by mouth daily as needed. (Patient not taking: Reported on 6/22/2023)    [DISCONTINUED] diclofenac sodium (VOLTAREN) 1 % Gel Apply 2 g topically 4 (four) times daily.    [DISCONTINUED] LIDOcaine 4 % PtMd Apply 1 patch topically daily as needed (Back pain).    [DISCONTINUED] sulfamethoxazole-trimethoprim 800-160mg (BACTRIM DS) 800-160 mg Tab Take 2 tablets by mouth 2 (two) times daily. First dose 6/16 in the evening.     Family History       Problem Relation (Age of Onset)    Cirrhosis Father    Drug abuse Mother, Father    Hepatitis Mother, Father    Liver cancer Mother          Tobacco Use    Smoking status: Some Days     Packs/day: 0.50     Years: 23.00     Pack years: 11.50     Types: Cigarettes    Smokeless tobacco: Never    Tobacco comments:     patient states she knows she nees to quit.   Substance and Sexual Activity    Alcohol use: Not Currently     Comment: quit 2014    Drug use: Not Currently     Frequency: 4.0 times per week     Types: Marijuana     Comment: Patient denies needle use, only inhalation of methampehtamines or orally.  Last known drug use was prior to admission to hospital stay for surgery    Sexual activity: Yes     Partners: Male     Birth control/protection: None     Review of Systems   Constitutional:  Positive for fatigue. Negative for chills and fever.   HENT:  Negative for ear discharge and ear pain.    Eyes:  Negative for pain and discharge.   Respiratory:  Negative for cough and shortness of breath.    Cardiovascular:  Positive for chest pain (mostly associated when other areas in her body are in pain). Negative for leg swelling.   Gastrointestinal:  Negative for nausea and vomiting.   Endocrine: Negative for cold intolerance and heat intolerance.   Genitourinary:  Positive for dysuria. Negative for difficulty urinating.   Musculoskeletal:  Positive for arthralgias  and gait problem. Negative for myalgias.        Right arm pain    Skin:  Negative for rash and wound.   Neurological:  Negative for dizziness and headaches.   Psychiatric/Behavioral:  Negative for agitation and confusion.    Objective:     Vital Signs (Most Recent):  Temp: 97.1 °F (36.2 °C) (07/07/23 1138)  Pulse: 80 (07/07/23 1200)  Resp: 20 (07/07/23 1138)  BP: (!) 104/51 (07/07/23 1138)  SpO2: 100 % (07/07/23 1138) Vital Signs (24h Range):  Temp:  [97.1 °F (36.2 °C)-98 °F (36.7 °C)] 97.1 °F (36.2 °C)  Pulse:  [71-91] 80  Resp:  [18-20] 20  SpO2:  [98 %-100 %] 100 %  BP: ()/(50-55) 104/51     Weight: 118.9 kg (262 lb 2 oz)  Body mass index is 41.05 kg/m².     Physical Exam  Constitutional:       Appearance: Normal appearance.   HENT:      Head: Normocephalic and atraumatic.   Cardiovascular:      Rate and Rhythm: Normal rate and regular rhythm.   Pulmonary:      Effort: Pulmonary effort is normal. No respiratory distress.   Abdominal:      General: Bowel sounds are normal. There is distension.      Palpations: Abdomen is soft.      Tenderness: There is no abdominal tenderness.   Musculoskeletal:         General: Tenderness present.      Right lower leg: No edema.      Left lower leg: No edema.      Comments: RUE tenderness and decreased ROM   Skin:     General: Skin is warm and dry.      Comments: Multiple wounds on  bilateral heels and sacral region    Neurological:      Mental Status: She is alert and oriented to person, place, and time. Mental status is at baseline.   Psychiatric:         Mood and Affect: Mood normal.         Behavior: Behavior normal.              Significant Labs: A1C: No results for input(s): HGBA1C in the last 4320 hours.  ABGs: No results for input(s): PH, PCO2, HCO3, POCSATURATED, BE, TOTALHB, COHB, METHB, O2HB, POCFIO2, PO2 in the last 48 hours.  Bilirubin:   Recent Labs   Lab 06/14/23  0428 06/15/23  0335 06/16/23  0519 07/06/23  2249 07/07/23  1133   BILITOT 2.1* 2.4* 2.4* 3.1*  3.2*     Blood Culture:   Recent Labs   Lab 07/06/23 2249   LABBLOO No Growth to date  No Growth to date     BMP:   Recent Labs   Lab 07/07/23  1133   *   *   K 4.0      CO2 21*   BUN 7   CREATININE 0.5   CALCIUM 7.2*   MG 1.7     CBC:   Recent Labs   Lab 07/06/23 2249 07/07/23  1133   WBC 4.38 3.53*   HGB 7.7* 7.7*   HCT 25.9* 24.8*   PLT 81* 76*     CMP:   Recent Labs   Lab 07/06/23 2249 07/07/23  1133   * 131*   K 3.5 4.0    103   CO2 22* 21*   * 111*   BUN 8 7   CREATININE 0.6 0.5   CALCIUM 7.3* 7.2*   PROT 6.4 6.3   ALBUMIN 1.4* 1.4*   BILITOT 3.1* 3.2*   ALKPHOS 138* 137*   AST 40 39   ALT 16 16   ANIONGAP 7* 7*     Cardiac Markers:   Recent Labs   Lab 07/06/23 2249   *     Coagulation:   Recent Labs   Lab 07/06/23  2307   INR 1.8*     Lactic Acid:   Recent Labs   Lab 07/06/23 2249   LACTATE 1.5     Lipase: No results for input(s): LIPASE in the last 48 hours.  Lipid Panel: No results for input(s): CHOL, HDL, LDLCALC, TRIG, CHOLHDL in the last 48 hours.  Magnesium:   Recent Labs   Lab 07/07/23  1133   MG 1.7     POCT Glucose: No results for input(s): POCTGLUCOSE in the last 48 hours.  Prealbumin: No results for input(s): PREALBUMIN in the last 48 hours.  Respiratory Culture: No results for input(s): GSRESP, RESPIRATORYC in the last 48 hours.  Troponin:   Recent Labs   Lab 07/06/23 2249   TROPONINI 0.006     TSH: No results for input(s): TSH in the last 4320 hours.  Urine Culture: No results for input(s): LABURIN in the last 48 hours.  Urine Studies:   Recent Labs   Lab 07/06/23 2250   COLORU Yellow   APPEARANCEUA Hazy*   PHUR 6.0   SPECGRAV 1.025   PROTEINUA 1+*   GLUCUA Negative   KETONESU Negative   BILIRUBINUA 2+*   OCCULTUA 1+*   NITRITE Positive*   UROBILINOGEN >=8.0*   LEUKOCYTESUR Trace*   RBCUA 0   WBCUA 3   BACTERIA Moderate*   HYALINECASTS 0       Significant Imaging: I have reviewed all pertinent imaging results/findings within the past 24  hours.
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
## 2023-07-07 NOTE — ASSESSMENT & PLAN NOTE
+ nitrites on U/A; WB on microscopic mildly elevated and only trace amount of leukocytes  Urine culture pending  Will tx due to symptomatic  H/o ESBL klebsiella Pneumoniae  Started IV meropenem

## 2023-07-07 NOTE — CONSULTS
[place] [facility] Surg (3rd Fl)  General Surgery  Consult Note    Inpatient consult to General Surgery  Consult performed by: Ammon Quijano MD  Consult ordered by: Leyla Blair PA-C      Subjective:     Chief Complaint/Reason for Admission:    History of Present Illness:  42-year-old obese female with history of anxiety depression substance abuse hepatitis-C and cirrhosis who has had numerous back surgeries for infected hardware who was admitted earlier today with hypotension.  Patient essentially bed-bound and wheelchair-bound.  Patient found to have bilateral heel wounds and right buttocks wound.  Patient can give no real history on how wound the wounds have been present and what kind of wound care has been performed.  Consulted for evaluation and wound care  No current facility-administered medications on file prior to encounter.     Current Outpatient Medications on File Prior to Encounter   Medication Sig    bumetanide (BUMEX) 1 MG tablet Take 1 tablet (1 mg total) by mouth once daily.    buprenorphine-naloxone 8-2 mg (SUBOXONE) 8-2 mg Place under the tongue 2 (two) times a day.    DULoxetine (CYMBALTA) 60 MG capsule Take 60 mg by mouth once daily.    gabapentin (NEURONTIN) 300 MG capsule Take 3 capsules (900 mg total) by mouth 3 (three) times daily.    hydrOXYzine HCL (ATARAX) 25 MG tablet Take 1 tablet (25 mg total) by mouth 3 (three) times daily as needed for Anxiety.    hydrOXYzine pamoate (VISTARIL) 25 MG Cap Take 25 mg by mouth 2 (two) times daily as needed.    lactulose (CHRONULAC) 20 gram/30 mL Soln Take 30 mLs (20 g total) by mouth 3 (three) times daily.    melatonin (MELATIN) 3 mg tablet Take 2 tablets (6 mg total) by mouth nightly as needed for Insomnia.    pantoprazole (PROTONIX) 40 MG tablet Take 1 tablet (40 mg total) by mouth once daily.    spironolactone (ALDACTONE) 100 MG tablet Take 1 tablet (100 mg total) by mouth once daily.    tiZANidine (ZANAFLEX) 4 MG tablet Take 1 tablet (4 mg  total) by mouth 3 (three) times daily as needed (Pain/muscle spasms).    acetaminophen (TYLENOL) 500 MG tablet Take 2 tablets (1,000 mg total) by mouth every 12 (twelve) hours. (Patient not taking: Reported on 6/22/2023)    calcium carbonate (TUMS) 200 mg calcium (500 mg) chewable tablet Take 2 tablets (1,000 mg total) by mouth 3 (three) times daily as needed.    folic acid (FOLVITE) 1 MG tablet Take 1 tablet (1 mg total) by mouth once daily. (Patient taking differently: Take 1 mg by mouth daily as needed (vitamin supplement).)    ondansetron (ZOFRAN) 4 MG tablet Take 8 mg by mouth 2 (two) times daily as needed for Nausea.    oxyCODONE (ROXICODONE) 5 MG immediate release tablet Take 5 mg by mouth.    polyethylene glycol (GLYCOLAX) 17 gram PwPk Take 17 g by mouth daily as needed. (Patient not taking: Reported on 6/22/2023)    [DISCONTINUED] diclofenac sodium (VOLTAREN) 1 % Gel Apply 2 g topically 4 (four) times daily.    [DISCONTINUED] LIDOcaine 4 % PtMd Apply 1 patch topically daily as needed (Back pain).    [DISCONTINUED] sulfamethoxazole-trimethoprim 800-160mg (BACTRIM DS) 800-160 mg Tab Take 2 tablets by mouth 2 (two) times daily. First dose 6/16 in the evening.       Review of patient's allergies indicates:   Allergen Reactions    Bee venom protein (honey bee) Anaphylaxis     Patient reports she is allergic to bee stings.    Naproxen Anaphylaxis     Throat closing    12-23- Patient reports taking Ibuprofen 200 mg at home without problems. Verified X3. [place]    Wasp sting [allergen ext-venom-honey bee] Anaphylaxis    Adhesive Blisters    Shellfish containing products     Iodine and iodide containing products Hives and Itching     Allergic to iodine in seafood only    Nuts [tree nut] Rash       Past Medical History:   Diagnosis Date    Anemia     Anxiety     Depressed     Diskitis     Hep C w/o coma, chronic     IV drug abuse     Kidney stone     Liver cirrhosis      Past Surgical History:   Procedure Laterality Date     ARTHROTOMY OF SHOULDER Left 11/15/2022    Procedure: ARTHROTOMY, SHOULDER;  Surgeon: Bjorn Hayes MD;  Location: [facility];  Service: Orthopedics;  Laterality: Left;  Left acromioclavicular joint arthrotomy    BACK SURGERY      benine tumor removal      forehead, age 9    CHOLECYSTECTOMY      KIDNEY SURGERY      LUMBAR FUSION Bilateral 3/2/2022    Procedure: FUSION, SPINE, LUMBAR;  Surgeon: Guille Templeton MD;  Location: [facility] OR [facility];  Service: Neurosurgery;  Laterality: Bilateral;  AIRO  T10--Pelvis    LUMBAR FUSION N/A 1/24/2023    Procedure: **AIRO** T8-T12 POSTERIOR FUSION, T8-T10 LAMINECTOMY, HARDWARE REMOVAL AND WASHOUT;  Surgeon: Guille Templeton MD;  Location: [facility] OR [facility];  Service: Neurosurgery;  Laterality: N/A;    REMOVAL OF HARDWARE FROM SPINE N/A 2/21/2022    Procedure: REMOVAL, HARDWARE, SPINE;  Surgeon: Guille Templeton MD;  Location: [facility] OR [facility];  Service: Neurosurgery;  Laterality: N/A;  Washout    SPINE SURGERY      THORACIC LAMINECTOMY WITH FUSION N/A 2/2/2023    Procedure: LAMINECTOMY, SPINE, THORACIC, WITH FUSION (T4-Pelvis fusion w/ T9-10 corpectomies) **AIRO;  Surgeon: Guille Templeton MD;  Location: [facility];  Service: Neurosurgery;  Laterality: N/A;  AIRO, 2 bovies, aquamantys, Globus, Depuy, antibiotic beads, TXA, Peroxide; Babycos plastics closure     Family History       Problem Relation (Age of Onset)    Cirrhosis Father    Drug abuse Mother, Father    Hepatitis Mother, Father    Liver cancer Mother          Tobacco Use    Smoking status: Some Days     Packs/day: 0.50     Years: 23.00     Pack years: 11.50     Types: Cigarettes    Smokeless tobacco: Never    Tobacco comments:     patient states she knows she nees to quit.   Substance and Sexual Activity    Alcohol use: Not Currently     Comment: quit 2014    Drug use: Not Currently     Frequency: 4.0 times per week     Types: Marijuana     Comment: Patient denies needle use, only inhalation of methampehtamines or orally.   Last known drug use was prior to admission to hospital stay for surgery    Sexual activity: Yes     Partners: Male     Birth control/protection: None     Review of Systems  Objective:     Vital Signs (Most Recent):  Temp: 97.1 °F (36.2 °C) (07/07/23 1138)  Pulse: 83 (07/07/23 1358)  Resp: 20 (07/07/23 1138)  BP: (!) 104/51 (07/07/23 1138)  SpO2: 100 % (07/07/23 1138) Vital Signs (24h Range):  Temp:  [97.1 °F (36.2 °C)-98 °F (36.7 °C)] 97.1 °F (36.2 °C)  Pulse:  [71-91] 83  Resp:  [18-20] 20  SpO2:  [98 %-100 %] 100 %  BP: ()/(50-55) 104/51     Weight: 118.9 kg (262 lb 2 oz)  Body mass index is 41.05 kg/m².      Intake/Output Summary (Last 24 hours) at 7/7/2023 1433  Last data filed at 7/7/2023 0820  Gross per 24 hour   Intake 1560.7 ml   Output 400 ml   Net 1160.7 ml       Physical Exam  Patient morbidly obese.  Patient anxious lying in bed.  Patient in no acute distress.    No significant lower extremity edema.  Patient has a right heel wound consisting mostly a black eschar unstageable.  No sign of infection.  Left heel wound with eschar unstageable.  No sign of infection.  Palpable pedal pulse.    Patient with full-thickness wound to the inferior right buttocks near the gluteal crease in upper thigh minimal slough present  Significant Labs:  All pertinent labs from the last 24 hours have been reviewed.    Significant Diagnostics:  I have reviewed all pertinent imaging results/findings within the past 24 hours.    Assessment/Plan:     Active Diagnoses:    Diagnosis Date Noted POA    Right arm pain [M79.601] 07/07/2023 Yes    Wounds, multiple [T07.XXXA] 07/07/2023 Yes    Hypoalbuminemia [E88.09] 07/07/2023 Yes    Hypotension [I95.9] 02/19/2023 Yes    Weakness of both lower extremities [R29.898] 01/20/2023 Yes    Debility [R53.81] 01/05/2023 Yes    Substance use disorder [F19.90] 03/15/2017 Yes     Chronic    Cirrhosis of liver with ascites [K74.60, R18.8] 12/06/2016 Yes     Chronic    UTI (urinary tract  infection) [N39.0] 03/04/2015 Yes      Problems Resolved During this Admission:   Explained to the patient that she would need debridement to remove the eschar and this could be performed at the bedside.  Patient refusing debridement at this time.  Will implement Santyl.  Patient will need offloading.  Patient [facility] in [facility] upon discharge    Thank you for your consult. I will sign off. Please contact us if you have any additional questions.    Ammon Quijano MD  General Surgery  [facility] (3rd Fl)

## 2023-07-07 NOTE — ASSESSMENT & PLAN NOTE
Component of hypoalbuminemia and suspicion for underlying infection  U/A abnormal but unclear if this is source  Blood cultures pending  MAP 73

## 2023-07-08 LAB
ALBUMIN SERPL BCP-MCNC: 1.3 G/DL (ref 3.5–5.2)
ALP SERPL-CCNC: 128 U/L (ref 55–135)
ALT SERPL W/O P-5'-P-CCNC: 14 U/L (ref 10–44)
ANION GAP SERPL CALC-SCNC: 4 MMOL/L (ref 8–16)
AST SERPL-CCNC: 37 U/L (ref 10–40)
BASOPHILS # BLD AUTO: 0.02 K/UL (ref 0–0.2)
BASOPHILS NFR BLD: 0.6 % (ref 0–1.9)
BILIRUB SERPL-MCNC: 3 MG/DL (ref 0.1–1)
BUN SERPL-MCNC: 9 MG/DL (ref 6–20)
CALCIUM SERPL-MCNC: 7.1 MG/DL (ref 8.7–10.5)
CHLORIDE SERPL-SCNC: 104 MMOL/L (ref 95–110)
CO2 SERPL-SCNC: 23 MMOL/L (ref 23–29)
CREAT SERPL-MCNC: 0.6 MG/DL (ref 0.5–1.4)
DIFFERENTIAL METHOD: ABNORMAL
EOSINOPHIL # BLD AUTO: 0.1 K/UL (ref 0–0.5)
EOSINOPHIL NFR BLD: 2.2 % (ref 0–8)
ERYTHROCYTE [DISTWIDTH] IN BLOOD BY AUTOMATED COUNT: 19.5 % (ref 11.5–14.5)
EST. GFR  (NO RACE VARIABLE): >60 ML/MIN/1.73 M^2
GLUCOSE SERPL-MCNC: 92 MG/DL (ref 70–110)
HCT VFR BLD AUTO: 24 % (ref 37–48.5)
HGB BLD-MCNC: 7.2 G/DL (ref 12–16)
IMM GRANULOCYTES # BLD AUTO: 0.05 K/UL (ref 0–0.04)
IMM GRANULOCYTES NFR BLD AUTO: 1.6 % (ref 0–0.5)
LYMPHOCYTES # BLD AUTO: 0.7 K/UL (ref 1–4.8)
LYMPHOCYTES NFR BLD: 20.6 % (ref 18–48)
MAGNESIUM SERPL-MCNC: 1.8 MG/DL (ref 1.6–2.6)
MCH RBC QN AUTO: 30.1 PG (ref 27–31)
MCHC RBC AUTO-ENTMCNC: 30 G/DL (ref 32–36)
MCV RBC AUTO: 100 FL (ref 82–98)
MONOCYTES # BLD AUTO: 0.4 K/UL (ref 0.3–1)
MONOCYTES NFR BLD: 13 % (ref 4–15)
NEUTROPHILS # BLD AUTO: 2 K/UL (ref 1.8–7.7)
NEUTROPHILS NFR BLD: 62 % (ref 38–73)
NRBC BLD-RTO: 0 /100 WBC
PLATELET # BLD AUTO: 91 K/UL (ref 150–450)
PMV BLD AUTO: 9.9 FL (ref 9.2–12.9)
POTASSIUM SERPL-SCNC: 4.1 MMOL/L (ref 3.5–5.1)
PROT SERPL-MCNC: 6 G/DL (ref 6–8.4)
RBC # BLD AUTO: 2.39 M/UL (ref 4–5.4)
SODIUM SERPL-SCNC: 131 MMOL/L (ref 136–145)
WBC # BLD AUTO: 3.16 K/UL (ref 3.9–12.7)

## 2023-07-08 PROCEDURE — 25000003 PHARM REV CODE 250: Performed by: SURGERY

## 2023-07-08 PROCEDURE — 63600175 PHARM REV CODE 636 W HCPCS: Performed by: FAMILY MEDICINE

## 2023-07-08 PROCEDURE — 83735 ASSAY OF MAGNESIUM: CPT | Performed by: PHYSICIAN ASSISTANT

## 2023-07-08 PROCEDURE — 27000207 HC ISOLATION

## 2023-07-08 PROCEDURE — 85025 COMPLETE CBC W/AUTO DIFF WBC: CPT | Performed by: PHYSICIAN ASSISTANT

## 2023-07-08 PROCEDURE — 63600175 PHARM REV CODE 636 W HCPCS: Performed by: SURGERY

## 2023-07-08 PROCEDURE — 11000001 HC ACUTE MED/SURG PRIVATE ROOM

## 2023-07-08 PROCEDURE — 80053 COMPREHEN METABOLIC PANEL: CPT | Performed by: PHYSICIAN ASSISTANT

## 2023-07-08 PROCEDURE — 99900031 HC PATIENT EDUCATION (STAT)

## 2023-07-08 PROCEDURE — 99233 PR SUBSEQUENT HOSPITAL CARE,LEVL III: ICD-10-PCS | Mod: ,,, | Performed by: FAMILY MEDICINE

## 2023-07-08 PROCEDURE — 94761 N-INVAS EAR/PLS OXIMETRY MLT: CPT

## 2023-07-08 PROCEDURE — 25000003 PHARM REV CODE 250: Performed by: FAMILY MEDICINE

## 2023-07-08 PROCEDURE — 36415 COLL VENOUS BLD VENIPUNCTURE: CPT | Performed by: PHYSICIAN ASSISTANT

## 2023-07-08 PROCEDURE — 99900035 HC TECH TIME PER 15 MIN (STAT)

## 2023-07-08 PROCEDURE — 25000003 PHARM REV CODE 250: Performed by: PHYSICIAN ASSISTANT

## 2023-07-08 PROCEDURE — 99233 SBSQ HOSP IP/OBS HIGH 50: CPT | Mod: ,,, | Performed by: FAMILY MEDICINE

## 2023-07-08 RX ADMIN — BUPRENORPHINE AND NALOXONE 8 MG: 8; 2 TABLET SUBLINGUAL at 01:07

## 2023-07-08 RX ADMIN — MEROPENEM 2 G: 1 INJECTION, POWDER, FOR SOLUTION INTRAVENOUS at 12:07

## 2023-07-08 RX ADMIN — GABAPENTIN 300 MG: 300 CAPSULE ORAL at 09:07

## 2023-07-08 RX ADMIN — PANTOPRAZOLE SODIUM 40 MG: 40 TABLET, DELAYED RELEASE ORAL at 09:07

## 2023-07-08 RX ADMIN — GABAPENTIN 300 MG: 300 CAPSULE ORAL at 04:07

## 2023-07-08 RX ADMIN — TIZANIDINE 4 MG: 2 TABLET ORAL at 09:07

## 2023-07-08 RX ADMIN — COLLAGENASE SANTYL: 250 OINTMENT TOPICAL at 04:07

## 2023-07-08 RX ADMIN — GABAPENTIN 300 MG: 300 CAPSULE ORAL at 08:07

## 2023-07-08 RX ADMIN — ACETAMINOPHEN 500 MG: 500 TABLET ORAL at 09:07

## 2023-07-08 RX ADMIN — MEROPENEM 2 G: 1 INJECTION, POWDER, FOR SOLUTION INTRAVENOUS at 03:07

## 2023-07-08 RX ADMIN — BUPRENORPHINE AND NALOXONE 8 MG: 8; 2 TABLET SUBLINGUAL at 08:07

## 2023-07-08 NOTE — ASSESSMENT & PLAN NOTE
+ methamphetamine use on UDS  Cessation resources from CM at discharge   Resuming her suboxone here that she takes at home

## 2023-07-08 NOTE — ASSESSMENT & PLAN NOTE
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 This could cause some radicular pain radiating into the shoulder...   Her xray of shoulder shows that she has a high riding humeral head, concerning for a supraspinatus tear...she can address this as an outpt with an MRI    Continue home gabapentin. Order PT for her as outpt depending on her disposition.... trying for LTAC

## 2023-07-08 NOTE — ASSESSMENT & PLAN NOTE
Monitor AST and ALT  Ok to give her tylenol for pain, as long as she is getting less than 2g/day total

## 2023-07-08 NOTE — PROGRESS NOTES
[facility] ([facility])  [facility]  Progress Note    Patient Name: Rachel Zazueta  MRN: [id number]  Patient Class: IP- Inpatient   Admission Date: 7/6/2023  Length of Stay: 1 days  Attending Physician: Brad Zazueta MD  Primary Care Provider: Dannielle Merino DO        Subjective:     Principal Problem:<principal problem not specified>        HPI:  Patient is a 42 year old female with medical history of anxiety, depression, substance use disorder (+ meth on UDS), hepatitis C with liver cirrhosis and TIPS procedure, spinal fusions and discits who presented to the ED with right arm pain.  Found to be hypotensive.  She has had dysuria but denies nausea, vomiting and abdominal pain.  Right arm pain started when she fell out of her wheel chair one week ago.  She is having difficulty moving it.  Pain is in elbow and shoulder.  She has intermittent chest pain that occurs mostly when she is pain elsewhere.  She has been unable to ambulate after discitis and has sores on her heels.          Admitted for Right arm pain and hypotension.        Overview/Hospital Course:  BP is stable, no MAP less than 65, most readings in the 70s   Afebrile   Blood Cx NGTD   Xrays with significant DJD C3/4/5  Shoulder xray with high riding humeral head suggesting a supraspinatous tear, but she is in too much pain to examine. Will likely need an MRI as outpt.          Past Medical History:   Diagnosis Date    Anemia     Anxiety     Depressed     Diskitis     Hep C w/o coma, chronic     IV drug abuse     Kidney stone     Liver cirrhosis        Past Surgical History:   Procedure Laterality Date    ARTHROTOMY OF SHOULDER Left 11/15/2022    Procedure: ARTHROTOMY, SHOULDER;  Surgeon: Bjorn Hayes MD;  Location: [facility];  Service: Orthopedics;  Laterality: Left;  Left acromioclavicular joint arthrotomy    BACK SURGERY      benine tumor removal      forehead, age 9    CHOLECYSTECTOMY      KIDNEY SURGERY       LUMBAR FUSION Bilateral 3/2/2022    Procedure: FUSION, SPINE, LUMBAR;  Surgeon: Guille Templeton MD;  Location: [facility] OR 2ND FLR;  Service: Neurosurgery;  Laterality: Bilateral;  AIRO  T10--Pelvis    LUMBAR FUSION N/A 1/24/2023    Procedure: **AIRO** T8-T12 POSTERIOR FUSION, T8-T10 LAMINECTOMY, HARDWARE REMOVAL AND WASHOUT;  Surgeon: Guille Templeton MD;  Location: [facility] OR [facility] FLR;  Service: Neurosurgery;  Laterality: N/A;    REMOVAL OF HARDWARE FROM SPINE N/A 2/21/2022    Procedure: REMOVAL, HARDWARE, SPINE;  Surgeon: Guille Templeton MD;  Location: [facility] OR 2ND FLR;  Service: Neurosurgery;  Laterality: N/A;  Washout    SPINE SURGERY      THORACIC LAMINECTOMY WITH FUSION N/A 2/2/2023    Procedure: LAMINECTOMY, SPINE, THORACIC, WITH FUSION (T4-Pelvis fusion w/ T9-10 corpectomies) **AIRO;  Surgeon: Guille Templeton MD;  Location: [facility] OR [facility] FLR;  Service: Neurosurgery;  Laterality: N/A;  AIRO, 2 bovies, aquamantys, Globus, Depuy, antibiotic beads, TXA, Peroxide; Babycos plastics closure       Review of patient's allergies indicates:   Allergen Reactions    Bee venom protein (honey bee) Anaphylaxis     Patient reports she is allergic to bee stings.    Naproxen Anaphylaxis     Throat closing    12-23- Patient reports taking Ibuprofen 200 mg at home without problems. Verified X3. [place]    Wasp sting [allergen ext-venom-honey bee] Anaphylaxis    Adhesive Blisters    Shellfish containing products     Iodine and iodide containing products Hives and Itching     Allergic to iodine in seafood only    Nuts [tree nut] Rash       No current facility-administered medications on file prior to encounter.     Current Outpatient Medications on File Prior to Encounter   Medication Sig    bumetanide (BUMEX) 1 MG tablet Take 1 tablet (1 mg total) by mouth once daily.    buprenorphine-naloxone 8-2 mg (SUBOXONE) 8-2 mg Place under the tongue 2 (two) times a day.    DULoxetine (CYMBALTA) 60 MG capsule Take 60 mg by mouth once daily.     hydrOXYzine HCL (ATARAX) 25 MG tablet Take 1 tablet (25 mg total) by mouth 3 (three) times daily as needed for Anxiety.    hydrOXYzine pamoate (VISTARIL) 25 MG Cap Take 25 mg by mouth 2 (two) times daily as needed.    lactulose (CHRONULAC) 20 gram/30 mL Soln Take 30 mLs (20 g total) by mouth 3 (three) times daily.    melatonin (MELATIN) 3 mg tablet Take 2 tablets (6 mg total) by mouth nightly as needed for Insomnia.    pantoprazole (PROTONIX) 40 MG tablet Take 1 tablet (40 mg total) by mouth once daily.    spironolactone (ALDACTONE) 100 MG tablet Take 1 tablet (100 mg total) by mouth once daily.    tiZANidine (ZANAFLEX) 4 MG tablet Take 1 tablet (4 mg total) by mouth 3 (three) times daily as needed (Pain/muscle spasms).    acetaminophen (TYLENOL) 500 MG tablet Take 2 tablets (1,000 mg total) by mouth every 12 (twelve) hours. (Patient not taking: Reported on 6/22/2023)    calcium carbonate (TUMS) 200 mg calcium (500 mg) chewable tablet Take 2 tablets (1,000 mg total) by mouth 3 (three) times daily as needed.    folic acid (FOLVITE) 1 MG tablet Take 1 tablet (1 mg total) by mouth once daily. (Patient taking differently: Take 1 mg by mouth daily as needed (vitamin supplement).)    gabapentin (NEURONTIN) 300 MG capsule Take 3 capsules (900 mg total) by mouth 3 (three) times daily.    ondansetron (ZOFRAN) 4 MG tablet Take 8 mg by mouth 2 (two) times daily as needed for Nausea.    oxyCODONE (ROXICODONE) 5 MG immediate release tablet Take 5 mg by mouth.    polyethylene glycol (GLYCOLAX) 17 gram PwPk Take 17 g by mouth daily as needed. (Patient not taking: Reported on 6/22/2023)    [DISCONTINUED] diclofenac sodium (VOLTAREN) 1 % Gel Apply 2 g topically 4 (four) times daily.     Family History       Problem Relation (Age of Onset)    Cirrhosis Father    Drug abuse Mother, Father    Hepatitis Mother, Father    Liver cancer Mother          Tobacco Use    Smoking status: Some Days     Packs/day: 0.50     Years:  23.00     Pack years: 11.50     Types: Cigarettes    Smokeless tobacco: Never    Tobacco comments:     patient states she knows she nees to quit.   Substance and Sexual Activity    Alcohol use: Not Currently     Comment: quit 2014    Drug use: Not Currently     Frequency: 4.0 times per week     Types: Marijuana     Comment: Patient denies needle use, only inhalation of methampehtamines or orally.  Last known drug use was prior to admission to hospital stay for surgery    Sexual activity: Yes     Partners: Male     Birth control/protection: None     Review of Systems   Constitutional:  Positive for fatigue. Negative for chills and fever.   HENT:  Negative for ear discharge and ear pain.    Eyes:  Negative for pain and discharge.   Respiratory:  Negative for cough and shortness of breath.    Cardiovascular:  Positive for chest pain (mostly associated when other areas in her body are in pain). Negative for leg swelling.   Gastrointestinal:  Negative for nausea and vomiting.   Endocrine: Negative for cold intolerance and heat intolerance.   Genitourinary:  Positive for dysuria. Negative for difficulty urinating.   Musculoskeletal:  Positive for arthralgias and gait problem. Negative for myalgias.        Right arm pain    Skin:  Positive for wound. Negative for rash.   Neurological:  Negative for dizziness and headaches.   Psychiatric/Behavioral:  Negative for agitation and confusion.    Objective:     Vital Signs (Most Recent):  Temp: 98.3 °F (36.8 °C) (07/08/23 0724)  Pulse: 93 (07/08/23 1000)  Resp: 18 (07/08/23 0724)  BP: (!) 103/57 (07/08/23 0724)  SpO2: 100 % (07/08/23 0724) Vital Signs (24h Range):  Temp:  [96.7 °F (35.9 °C)-98.3 °F (36.8 °C)] 98.3 °F (36.8 °C)  Pulse:  [76-93] 93  Resp:  [18-20] 18  SpO2:  [95 %-100 %] 100 %  BP: ()/(51-58) 103/57     Weight: 118.9 kg (262 lb 2 oz)  Body mass index is 41.05 kg/m².     Physical Exam  Constitutional:       Appearance: Normal appearance.   HENT:       Head: Normocephalic and atraumatic.   Cardiovascular:      Rate and Rhythm: Normal rate and regular rhythm.   Pulmonary:      Effort: Pulmonary effort is normal. No respiratory distress.   Abdominal:      General: Bowel sounds are normal. There is distension.      Palpations: Abdomen is soft.      Tenderness: There is no abdominal tenderness.   Musculoskeletal:         General: Tenderness present.      Right lower leg: No edema.      Left lower leg: No edema.      Comments: RUE tenderness and decreased ROM   Skin:     General: Skin is warm and dry.      Comments: Multiple wounds on  bilateral heels and sacral region    Neurological:      Mental Status: She is alert and oriented to person, place, and time. Mental status is at baseline.   Psychiatric:         Mood and Affect: Mood normal.         Behavior: Behavior normal.              Significant Labs: A1C: No results for input(s): HGBA1C in the last 4320 hours.  ABGs: No results for input(s): PH, PCO2, HCO3, POCSATURATED, BE, TOTALHB, COHB, METHB, O2HB, POCFIO2, PO2 in the last 48 hours.  Bilirubin:   Recent Labs   Lab 06/15/23  0335 06/16/23  0519 07/06/23 2249 07/07/23 1133 07/08/23  0604   BILITOT 2.4* 2.4* 3.1* 3.2* 3.0*       Blood Culture:   Recent Labs   Lab 07/06/23 2249   LABBLOO No Growth to date  No Growth to date  No Growth to date  No Growth to date       BMP:   Recent Labs   Lab 07/08/23  0604   GLU 92   *   K 4.1      CO2 23   BUN 9   CREATININE 0.6   CALCIUM 7.1*   MG 1.8       CBC:   Recent Labs   Lab 07/06/23 2249 07/07/23 1133 07/08/23  0604   WBC 4.38 3.53* 3.16*   HGB 7.7* 7.7* 7.2*   HCT 25.9* 24.8* 24.0*   PLT 81* 76* 91*       CMP:   Recent Labs   Lab 07/06/23 2249 07/07/23 1133 07/08/23  0604   * 131* 131*   K 3.5 4.0 4.1    103 104   CO2 22* 21* 23   * 111* 92   BUN 8 7 9   CREATININE 0.6 0.5 0.6   CALCIUM 7.3* 7.2* 7.1*   PROT 6.4 6.3 6.0   ALBUMIN 1.4* 1.4* 1.3*   BILITOT 3.1* 3.2* 3.0*   ALKPHOS  138* 137* 128   AST 40 39 37   ALT 16 16 14   ANIONGAP 7* 7* 4*       Cardiac Markers:   Recent Labs   Lab 07/06/23 2249   *       Coagulation:   Recent Labs   Lab 07/06/23  2307   INR 1.8*       Lactic Acid:   Recent Labs   Lab 07/06/23 2249   LACTATE 1.5       Lipase: No results for input(s): LIPASE in the last 48 hours.  Lipid Panel: No results for input(s): CHOL, HDL, LDLCALC, TRIG, CHOLHDL in the last 48 hours.  Magnesium:   Recent Labs   Lab 07/07/23  1133 07/08/23  0604   MG 1.7 1.8       POCT Glucose: No results for input(s): POCTGLUCOSE in the last 48 hours.  Prealbumin: No results for input(s): PREALBUMIN in the last 48 hours.  Respiratory Culture: No results for input(s): GSRESP, RESPIRATORYC in the last 48 hours.  Troponin:   Recent Labs   Lab 07/06/23 2249   TROPONINI 0.006       TSH: No results for input(s): TSH in the last 4320 hours.  Urine Culture: No results for input(s): LABURIN in the last 48 hours.  Urine Studies:   Recent Labs   Lab 07/06/23 2250   COLORU Yellow   APPEARANCEUA Hazy*   PHUR 6.0   SPECGRAV 1.025   PROTEINUA 1+*   GLUCUA Negative   KETONESU Negative   BILIRUBINUA 2+*   OCCULTUA 1+*   NITRITE Positive*   UROBILINOGEN >=8.0*   LEUKOCYTESUR Trace*   RBCUA 0   WBCUA 3   BACTERIA Moderate*   HYALINECASTS 0         Significant Imaging: I have reviewed all pertinent imaging results/findings within the past 24 hours.      Assessment/Plan:      Hypoalbuminemia  Lab Results   Component Value Date    ALBUMIN 1.3 (L) 07/08/2023       Dietician consult--Will add Srinivasa BID per recs     Wounds, multiple  Bilateral heels with escharSacral region    General surgery consulted---santyl ordered. Will likely need to be debrided at some point       Right arm pain  Right arm pain and decreased ROM  XRAY cspine with DJD C3/4/5. This could cause some radicular pain radiating into the shoulder...   Her xray of shoulder shows that she has a high riding humeral head, concerning for a supraspinatus  tear...she can address this as an outpt with an MRI    Continue home gabapentin. Order PT for her as outpt depending on her disposition.... trying for LTAC       Hypotension  Component of hypoalbuminemia and suspicion for underlying infection  U/A abnormal but unclear if this is source  Blood cultures pending. If negative at 48-72 hours and vitals remain stable I would feel comfortable with discharge   MAP stable in 70s      Weakness of both lower extremities  After discitis   Unable to ambulate       Debility  Chronic debility  Patient unable to care for herself  CM working placement to LTACH  PT is seeing her       Substance use disorder  + methamphetamine use on UDS  Cessation resources from  at discharge   Resuming her suboxone here that she takes at home       Cirrhosis of liver with ascites  Monitor AST and ALT  Ok to give her tylenol for pain, as long as she is getting less than 2g/day total         UTI (urinary tract infection)  + nitrites on U/A; WB on microscopic mildly elevated and only trace amount of leukocytes  Urine culture pending  Will tx due to symptomatic  H/o ESBL klebsiella Pneumoniae  Started IV meropenem, day #2         VTE Risk Mitigation (From admission, onward)         Ordered     IP VTE HIGH RISK PATIENT  Once         07/07/23 0107     Place sequential compression device  Until discontinued         07/07/23 0107                Discharge Planning   SHIRA:      Code Status: Full Code   Is the patient medically ready for discharge?:     Reason for patient still in hospital (select all that apply): Patient trending condition, Treatment and Pending disposition  Discharge Plan A: Long-term acute care facility (LTAC)                  Brad Zazueta MD  Department of Hospital Medicine   [place] [facility] ([facility])

## 2023-07-08 NOTE — ASSESSMENT & PLAN NOTE
Bilateral heels with escharSacral region    General surgery consulted---santyl ordered. Will likely need to be debrided at some point

## 2023-07-08 NOTE — ASSESSMENT & PLAN NOTE
Chronic debility  Patient unable to care for herself  CM working placement to LTACH  PT is seeing her

## 2023-07-08 NOTE — ASSESSMENT & PLAN NOTE
+ nitrites on U/A; WB on microscopic mildly elevated and only trace amount of leukocytes  Urine culture pending  Will tx due to symptomatic  H/o ESBL klebsiella Pneumoniae  Started IV meropenem, day #2

## 2023-07-08 NOTE — SUBJECTIVE & OBJECTIVE
Past Medical History:   Diagnosis Date    Anemia     Anxiety     Depressed     Diskitis     Hep C w/o coma, chronic     IV drug abuse     Kidney stone     Liver cirrhosis        Past Surgical History:   Procedure Laterality Date    ARTHROTOMY OF SHOULDER Left 11/15/2022    Procedure: ARTHROTOMY, SHOULDER;  Surgeon: Bjorn Hayes MD;  Location: [facility];  Service: Orthopedics;  Laterality: Left;  Left acromioclavicular joint arthrotomy    BACK SURGERY      benine tumor removal      forehead, age 9    CHOLECYSTECTOMY      KIDNEY SURGERY      LUMBAR FUSION Bilateral 3/2/2022    Procedure: FUSION, SPINE, LUMBAR;  Surgeon: Guille Templeton MD;  Location: [address];  Service: Neurosurgery;  Laterality: Bilateral;  AIRO  T10--Pelvis    LUMBAR FUSION N/A 1/24/2023    Procedure: **AIRO** T8-T12 POSTERIOR FUSION, T8-T10 LAMINECTOMY, HARDWARE REMOVAL AND WASHOUT;  Surgeon: Guille Templeton MD;  Location: [address];  Service: Neurosurgery;  Laterality: N/A;    REMOVAL OF HARDWARE FROM SPINE N/A 2/21/2022    Procedure: REMOVAL, HARDWARE, SPINE;  Surgeon: Guille Templeton MD;  Location: [address];  Service: Neurosurgery;  Laterality: N/A;  [place]    SPINE SURGERY      THORACIC LAMINECTOMY WITH FUSION N/A 2/2/2023    Procedure: LAMINECTOMY, SPINE, THORACIC, WITH FUSION (T4-Pelvis fusion w/ T9-10 corpectomies) **AIRO;  Surgeon: Guille Templeton MD;  Location: [address];  Service: Neurosurgery;  Laterality: N/A;  AIRO, 2 bovies, aquamantys, Globus, Depuy, antibiotic beads, TXA, Peroxide; Babycos plastics closure       Review of patient's allergies indicates:   Allergen Reactions    Bee venom protein (honey bee) Anaphylaxis     Patient reports she is allergic to bee stings.    Naproxen Anaphylaxis     Throat closing    12-23- Patient reports taking Ibuprofen 200 mg at home without problems. Verified X3. [place]    Wasp sting [allergen ext-venom-honey bee] Anaphylaxis    Adhesive Blisters    Shellfish containing products      Iodine and iodide containing products Hives and Itching     Allergic to iodine in seafood only    Nuts [tree nut] Rash       No current facility-administered medications on file prior to encounter.     Current Outpatient Medications on File Prior to Encounter   Medication Sig    bumetanide (BUMEX) 1 MG tablet Take 1 tablet (1 mg total) by mouth once daily.    buprenorphine-naloxone 8-2 mg (SUBOXONE) 8-2 mg Place under the tongue 2 (two) times a day.    DULoxetine (CYMBALTA) 60 MG capsule Take 60 mg by mouth once daily.    hydrOXYzine HCL (ATARAX) 25 MG tablet Take 1 tablet (25 mg total) by mouth 3 (three) times daily as needed for Anxiety.    hydrOXYzine pamoate (VISTARIL) 25 MG Cap Take 25 mg by mouth 2 (two) times daily as needed.    lactulose (CHRONULAC) 20 gram/30 mL Soln Take 30 mLs (20 g total) by mouth 3 (three) times daily.    melatonin (MELATIN) 3 mg tablet Take 2 tablets (6 mg total) by mouth nightly as needed for Insomnia.    pantoprazole (PROTONIX) 40 MG tablet Take 1 tablet (40 mg total) by mouth once daily.    spironolactone (ALDACTONE) 100 MG tablet Take 1 tablet (100 mg total) by mouth once daily.    tiZANidine (ZANAFLEX) 4 MG tablet Take 1 tablet (4 mg total) by mouth 3 (three) times daily as needed (Pain/muscle spasms).    acetaminophen (TYLENOL) 500 MG tablet Take 2 tablets (1,000 mg total) by mouth every 12 (twelve) hours. (Patient not taking: Reported on 6/22/2023)    calcium carbonate (TUMS) 200 mg calcium (500 mg) chewable tablet Take 2 tablets (1,000 mg total) by mouth 3 (three) times daily as needed.    folic acid (FOLVITE) 1 MG tablet Take 1 tablet (1 mg total) by mouth once daily. (Patient taking differently: Take 1 mg by mouth daily as needed (vitamin supplement).)    gabapentin (NEURONTIN) 300 MG capsule Take 3 capsules (900 mg total) by mouth 3 (three) times daily.    ondansetron (ZOFRAN) 4 MG tablet Take 8 mg by mouth 2 (two) times daily as needed for Nausea.    oxyCODONE  (ROXICODONE) 5 MG immediate release tablet Take 5 mg by mouth.    polyethylene glycol (GLYCOLAX) 17 gram PwPk Take 17 g by mouth daily as needed. (Patient not taking: Reported on 6/22/2023)    [DISCONTINUED] diclofenac sodium (VOLTAREN) 1 % Gel Apply 2 g topically 4 (four) times daily.     Family History       Problem Relation (Age of Onset)    Cirrhosis Father    Drug abuse Mother, Father    Hepatitis Mother, Father    Liver cancer Mother          Tobacco Use    Smoking status: Some Days     Packs/day: 0.50     Years: 23.00     Pack years: 11.50     Types: Cigarettes    Smokeless tobacco: Never    Tobacco comments:     patient states she knows she nees to quit.   Substance and Sexual Activity    Alcohol use: Not Currently     Comment: quit 2014    Drug use: Not Currently     Frequency: 4.0 times per week     Types: Marijuana     Comment: Patient denies needle use, only inhalation of methampehtamines or orally.  Last known drug use was prior to admission to hospital stay for surgery    Sexual activity: Yes     Partners: Male     Birth control/protection: None     Review of Systems   Constitutional:  Positive for fatigue. Negative for chills and fever.   HENT:  Negative for ear discharge and ear pain.    Eyes:  Negative for pain and discharge.   Respiratory:  Negative for cough and shortness of breath.    Cardiovascular:  Positive for chest pain (mostly associated when other areas in her body are in pain). Negative for leg swelling.   Gastrointestinal:  Negative for nausea and vomiting.   Endocrine: Negative for cold intolerance and heat intolerance.   Genitourinary:  Positive for dysuria. Negative for difficulty urinating.   Musculoskeletal:  Positive for arthralgias and gait problem. Negative for myalgias.        Right arm pain    Skin:  Positive for wound. Negative for rash.   Neurological:  Negative for dizziness and headaches.   Psychiatric/Behavioral:  Negative for agitation and confusion.    Objective:      Vital Signs (Most Recent):  Temp: 98.3 °F (36.8 °C) (07/08/23 0724)  Pulse: 93 (07/08/23 1000)  Resp: 18 (07/08/23 0724)  BP: (!) 103/57 (07/08/23 0724)  SpO2: 100 % (07/08/23 0724) Vital Signs (24h Range):  Temp:  [96.7 °F (35.9 °C)-98.3 °F (36.8 °C)] 98.3 °F (36.8 °C)  Pulse:  [76-93] 93  Resp:  [18-20] 18  SpO2:  [95 %-100 %] 100 %  BP: ()/(51-58) 103/57     Weight: 118.9 kg (262 lb 2 oz)  Body mass index is 41.05 kg/m².     Physical Exam  Constitutional:       Appearance: Normal appearance.   HENT:      Head: Normocephalic and atraumatic.   Cardiovascular:      Rate and Rhythm: Normal rate and regular rhythm.   Pulmonary:      Effort: Pulmonary effort is normal. No respiratory distress.   Abdominal:      General: Bowel sounds are normal. There is distension.      Palpations: Abdomen is soft.      Tenderness: There is no abdominal tenderness.   Musculoskeletal:         General: Tenderness present.      Right lower leg: No edema.      Left lower leg: No edema.      Comments: RUE tenderness and decreased ROM   Skin:     General: Skin is warm and dry.      Comments: Multiple wounds on  bilateral heels and sacral region    Neurological:      Mental Status: She is alert and oriented to person, place, and time. Mental status is at baseline.   Psychiatric:         Mood and Affect: Mood normal.         Behavior: Behavior normal.              Significant Labs: A1C: No results for input(s): HGBA1C in the last 4320 hours.  ABGs: No results for input(s): PH, PCO2, HCO3, POCSATURATED, BE, TOTALHB, COHB, METHB, O2HB, POCFIO2, PO2 in the last 48 hours.  Bilirubin:   Recent Labs   Lab 06/15/23  0335 06/16/23  0519 07/06/23  2249 07/07/23  1133 07/08/23  0604   BILITOT 2.4* 2.4* 3.1* 3.2* 3.0*       Blood Culture:   Recent Labs   Lab 07/06/23 2249   LABBLOO No Growth to date  No Growth to date  No Growth to date  No Growth to date       BMP:   Recent Labs   Lab 07/08/23  0604   GLU 92   *   K 4.1       CO2 23   BUN 9   CREATININE 0.6   CALCIUM 7.1*   MG 1.8       CBC:   Recent Labs   Lab 07/06/23 2249 07/07/23  1133 07/08/23  0604   WBC 4.38 3.53* 3.16*   HGB 7.7* 7.7* 7.2*   HCT 25.9* 24.8* 24.0*   PLT 81* 76* 91*       CMP:   Recent Labs   Lab 07/06/23 2249 07/07/23 1133 07/08/23  0604   * 131* 131*   K 3.5 4.0 4.1    103 104   CO2 22* 21* 23   * 111* 92   BUN 8 7 9   CREATININE 0.6 0.5 0.6   CALCIUM 7.3* 7.2* 7.1*   PROT 6.4 6.3 6.0   ALBUMIN 1.4* 1.4* 1.3*   BILITOT 3.1* 3.2* 3.0*   ALKPHOS 138* 137* 128   AST 40 39 37   ALT 16 16 14   ANIONGAP 7* 7* 4*       Cardiac Markers:   Recent Labs   Lab 07/06/23 2249   *       Coagulation:   Recent Labs   Lab 07/06/23  2307   INR 1.8*       Lactic Acid:   Recent Labs   Lab 07/06/23 2249   LACTATE 1.5       Lipase: No results for input(s): LIPASE in the last 48 hours.  Lipid Panel: No results for input(s): CHOL, HDL, LDLCALC, TRIG, CHOLHDL in the last 48 hours.  Magnesium:   Recent Labs   Lab 07/07/23 1133 07/08/23  0604   MG 1.7 1.8       POCT Glucose: No results for input(s): POCTGLUCOSE in the last 48 hours.  Prealbumin: No results for input(s): PREALBUMIN in the last 48 hours.  Respiratory Culture: No results for input(s): GSRESP, RESPIRATORYC in the last 48 hours.  Troponin:   Recent Labs   Lab 07/06/23 2249   TROPONINI 0.006       TSH: No results for input(s): TSH in the last 4320 hours.  Urine Culture: No results for input(s): LABURIN in the last 48 hours.  Urine Studies:   Recent Labs   Lab 07/06/23 2250   COLORU Yellow   APPEARANCEUA Hazy*   PHUR 6.0   SPECGRAV 1.025   PROTEINUA 1+*   GLUCUA Negative   KETONESU Negative   BILIRUBINUA 2+*   OCCULTUA 1+*   NITRITE Positive*   UROBILINOGEN >=8.0*   LEUKOCYTESUR Trace*   RBCUA 0   WBCUA 3   BACTERIA Moderate*   HYALINECASTS 0         Significant Imaging: I have reviewed all pertinent imaging results/findings within the past 24 hours.

## 2023-07-08 NOTE — HOSPITAL COURSE
BP is stable, no MAP less than 65, most readings in the 70s   Afebrile   Blood Cx NGTD   Xrays with significant DJD C3/4/5  Shoulder xray with high riding humeral head suggesting a supraspinatous tear, but she is in too much pain to examine. Will likely need an MRI as outpt.    7/9  MAP is 65-84  UOP 36cc/hr  Remains Afebrile   Blood Cx NGTD     PT blood pressure on lower side.  Will decrease gabapentin.  H/H is low and 1 unit of pRBC ordered.  Risks and benefits discussed with patient.  Low suspicion for infection and IV meropenem discontinued.  I believe hypotension is due to volume depletion due to hypoalbuminemia and anemia of chronic disease.  Goal map greater than 65.      PT with continued bp on lower side. 500cc bolus ordered.  Suboxone and tizanidine d/c.  Decreased gabapentin.   H/H improved with 1 unit blood transfusion.  Will continue to monitor.  Suspect bp on lower side due to liver disease but goal would be to maintain MAP greater than 65.  No obvious signs of infection.  Psych consulted for NH placement.      7/12 PT HD with map of 70.  BP improved with IV fluids.  Pt states she has full body pain but pain medications making her hypotensive.  Will only use tylenol and gabapentin.      7/13 Pt with hypoalbuminemia and hypotension.  Poor clinical prognosis.   Continue to discuss goals of care with patient.  Attempted to add midodrine however patient has not responded.  Will increase midodrine today.      7/14 PT bp improving and stating she is in significant pain.  Will increase gabapentin slowly.  Discussed avoiding opioid pain medications due to chronic pain.  Will add zoloft per psych recommendations.      7/15 BP stable. Remains with pain complaints. BP remains low. Unsuccessful blood draws. Unsuccessful PICC placement, will consult gen surg for central line as we have no other IV access. Start lidocaine patch and continue gabapentin. Again discussed need to avoid opioid pain meds due to  hypotension especially in light of no IV access for resuscitation, she expressed understanding. She does not want hospice/comfort measures at this time.     7/16/23: pt had central line placed yesterday for issues with vascular access. Started on norco 5mg q12 PRN for continued complaint of severe pain. BP stable. H&H low, but stable. No BM in 3-4 days, states she was on lactulose but held due to diarrhea.     7/17 PT HD stable on room air.  Facial rash on right.  One dose of benadryl ordered.  Waiting placement.      7/18 Pt HD stable on room air.  Facial rash resolved.  Waiting placement.  Plan for labs tomorrow.     7/19 PT HD stable on room air.  MG low today and will replace.  Positive for constipation.  Enema ordered.  Placement pending.      7/20 Still having constipation with small BM.  Will continue stool softeners, unsuccessful with enema and suppository.  Will attempt another enema.  Bowel sounds present.    7/21 Patient had large BM yesterday evening. Will continue stool softeners at discharge.  Mg on lower side and will give one dose of IV magnesium prior to discharge.  Will prescribe po mag daily.  She will see physician at NH and I recommend he refer her to GI, ID and ortho close to area.

## 2023-07-08 NOTE — PLAN OF CARE
Problem: Infection  Goal: Absence of Infection Signs and Symptoms  Outcome: Ongoing, Progressing     Problem: Adult Inpatient Plan of Care  Goal: Plan of Care Review  Outcome: Ongoing, Progressing  Goal: Patient-Specific Goal (Individualized)  Outcome: Ongoing, Progressing  Goal: Absence of Hospital-Acquired Illness or Injury  Outcome: Ongoing, Progressing  Goal: Optimal Comfort and Wellbeing  Outcome: Ongoing, Progressing  Goal: Readiness for Transition of Care  Outcome: Ongoing, Progressing     Problem: Bariatric Environmental Safety  Goal: Safety Maintained with Care  Outcome: Ongoing, Progressing     Problem: Impaired Wound Healing  Goal: Optimal Wound Healing  Outcome: Ongoing, Progressing     Problem: Skin Injury Risk Increased  Goal: Skin Health and Integrity  Outcome: Ongoing, Progressing

## 2023-07-08 NOTE — ASSESSMENT & PLAN NOTE
Component of hypoalbuminemia and suspicion for underlying infection  U/A abnormal but unclear if this is source  Blood cultures pending. If negative at 48-72 hours and vitals remain stable I would feel comfortable with discharge   MAP stable in 70s

## 2023-07-08 NOTE — ASSESSMENT & PLAN NOTE
Lab Results   Component Value Date    ALBUMIN 1.3 (L) 07/08/2023       Dietician consult--Will add Srinivasa BID per recs

## 2023-07-09 LAB
FERRITIN SERPL-MCNC: 376 NG/ML (ref 20–300)
FOLATE SERPL-MCNC: 8.2 NG/ML (ref 4–24)
IRON SERPL-MCNC: 77 UG/DL (ref 30–160)
SATURATED IRON: 80 % (ref 20–50)
TOTAL IRON BINDING CAPACITY: 96 UG/DL (ref 250–450)
TRANSFERRIN SERPL-MCNC: 65 MG/DL (ref 200–375)
VIT B12 SERPL-MCNC: 1334 PG/ML (ref 210–950)

## 2023-07-09 PROCEDURE — 82607 VITAMIN B-12: CPT | Performed by: FAMILY MEDICINE

## 2023-07-09 PROCEDURE — 99233 SBSQ HOSP IP/OBS HIGH 50: CPT | Mod: ,,, | Performed by: FAMILY MEDICINE

## 2023-07-09 PROCEDURE — 25000003 PHARM REV CODE 250: Performed by: SURGERY

## 2023-07-09 PROCEDURE — 82746 ASSAY OF FOLIC ACID SERUM: CPT | Performed by: FAMILY MEDICINE

## 2023-07-09 PROCEDURE — 99233 PR SUBSEQUENT HOSPITAL CARE,LEVL III: ICD-10-PCS | Mod: ,,, | Performed by: FAMILY MEDICINE

## 2023-07-09 PROCEDURE — 25000003 PHARM REV CODE 250: Performed by: PHYSICIAN ASSISTANT

## 2023-07-09 PROCEDURE — 27000207 HC ISOLATION

## 2023-07-09 PROCEDURE — 36415 COLL VENOUS BLD VENIPUNCTURE: CPT | Performed by: FAMILY MEDICINE

## 2023-07-09 PROCEDURE — 11000001 HC ACUTE MED/SURG PRIVATE ROOM

## 2023-07-09 PROCEDURE — 84466 ASSAY OF TRANSFERRIN: CPT | Performed by: FAMILY MEDICINE

## 2023-07-09 PROCEDURE — 82728 ASSAY OF FERRITIN: CPT | Performed by: FAMILY MEDICINE

## 2023-07-09 PROCEDURE — 94761 N-INVAS EAR/PLS OXIMETRY MLT: CPT

## 2023-07-09 PROCEDURE — 21400001 HC TELEMETRY ROOM

## 2023-07-09 PROCEDURE — 25000003 PHARM REV CODE 250: Performed by: FAMILY MEDICINE

## 2023-07-09 PROCEDURE — 63600175 PHARM REV CODE 636 W HCPCS: Performed by: FAMILY MEDICINE

## 2023-07-09 PROCEDURE — 63600175 PHARM REV CODE 636 W HCPCS: Performed by: SURGERY

## 2023-07-09 RX ORDER — SODIUM CHLORIDE 9 MG/ML
INJECTION, SOLUTION INTRAVENOUS CONTINUOUS
Status: ACTIVE | OUTPATIENT
Start: 2023-07-09 | End: 2023-07-09

## 2023-07-09 RX ORDER — GABAPENTIN 300 MG/1
300 CAPSULE ORAL NIGHTLY
Status: DISCONTINUED | OUTPATIENT
Start: 2023-07-09 | End: 2023-07-10

## 2023-07-09 RX ADMIN — COLLAGENASE SANTYL: 250 OINTMENT TOPICAL at 03:07

## 2023-07-09 RX ADMIN — ACETAMINOPHEN 500 MG: 500 TABLET ORAL at 04:07

## 2023-07-09 RX ADMIN — PANTOPRAZOLE SODIUM 40 MG: 40 TABLET, DELAYED RELEASE ORAL at 08:07

## 2023-07-09 RX ADMIN — GABAPENTIN 300 MG: 300 CAPSULE ORAL at 08:07

## 2023-07-09 RX ADMIN — MEROPENEM 2 G: 1 INJECTION, POWDER, FOR SOLUTION INTRAVENOUS at 03:07

## 2023-07-09 RX ADMIN — SODIUM CHLORIDE: 9 INJECTION, SOLUTION INTRAVENOUS at 12:07

## 2023-07-09 RX ADMIN — GABAPENTIN 300 MG: 300 CAPSULE ORAL at 03:07

## 2023-07-09 RX ADMIN — TIZANIDINE 4 MG: 2 TABLET ORAL at 08:07

## 2023-07-09 RX ADMIN — MEROPENEM 2 G: 1 INJECTION, POWDER, FOR SOLUTION INTRAVENOUS at 10:07

## 2023-07-09 RX ADMIN — BUPRENORPHINE AND NALOXONE 8 MG: 8; 2 TABLET SUBLINGUAL at 08:07

## 2023-07-09 RX ADMIN — MEROPENEM 2 G: 1 INJECTION, POWDER, FOR SOLUTION INTRAVENOUS at 06:07

## 2023-07-09 RX ADMIN — ACETAMINOPHEN 500 MG: 500 TABLET ORAL at 08:07

## 2023-07-09 RX ADMIN — TIZANIDINE 4 MG: 2 TABLET ORAL at 04:07

## 2023-07-09 RX ADMIN — MEROPENEM 2 G: 1 INJECTION, POWDER, FOR SOLUTION INTRAVENOUS at 12:07

## 2023-07-09 NOTE — PROGRESS NOTES
[facility] ([facility])  [facility] Medicine  Progress Note    Patient Name: Rachel Zazueta  MRN: [id number]  Patient Class: IP- Inpatient   Admission Date: 7/6/2023  Length of Stay: 2 days  Attending Physician: Brad Zazueta MD  Primary Care Provider: Dannielle Merino DO        Subjective:     Principal Problem:<principal problem not specified>        HPI:  Patient is a 42 year old female with medical history of anxiety, depression, substance use disorder (+ meth on UDS), hepatitis C with liver cirrhosis and TIPS procedure, spinal fusions and discits who presented to the ED with right arm pain.  Found to be hypotensive.  She has had dysuria but denies nausea, vomiting and abdominal pain.  Right arm pain started when she fell out of her wheel chair one week ago.  She is having difficulty moving it.  Pain is in elbow and shoulder.  She has intermittent chest pain that occurs mostly when she is pain elsewhere.  She has been unable to ambulate after discitis and has sores on her heels.          Admitted for Right arm pain and hypotension.        Overview/Hospital Course:  BP is stable, no MAP less than 65, most readings in the 70s   Afebrile   Blood Cx NGTD   Xrays with significant DJD C3/4/5  Shoulder xray with high riding humeral head suggesting a supraspinatous tear, but she is in too much pain to examine. Will likely need an MRI as outpt.    7/9  MAP is 65-84  UOP 36cc/hr  Remains Afebrile   Blood Cx NGTD       Past Medical History:   Diagnosis Date    Anemia     Anxiety     Depressed     Diskitis     Hep C w/o coma, chronic     IV drug abuse     Kidney stone     Liver cirrhosis        Past Surgical History:   Procedure Laterality Date    ARTHROTOMY OF SHOULDER Left 11/15/2022    Procedure: ARTHROTOMY, SHOULDER;  Surgeon: Bjorn Hayes MD;  Location: [facility];  Service: Orthopedics;  Laterality: Left;  Left acromioclavicular joint arthrotomy    BACK SURGERY      benine tumor removal       forehead, 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 OR [facility];  Service: Neurosurgery;  Laterality: Bilateral;  AIRO  T10--Pelvis    LUMBAR FUSION N/A 1/24/2023    Procedure: **AIRO** T8-T12 POSTERIOR FUSION, T8-T10 LAMINECTOMY, HARDWARE REMOVAL AND WASHOUT;  Surgeon: Guille Templeton MD;  Location: [facility] OR [facility];  Service: Neurosurgery;  Laterality: N/A;    REMOVAL OF HARDWARE FROM SPINE N/A 2/21/2022    Procedure: REMOVAL, HARDWARE, SPINE;  Surgeon: Guille Templeton MD;  Location: [facility] OR [facility];  Service: Neurosurgery;  Laterality: N/A;  [place]    SPINE SURGERY      THORACIC LAMINECTOMY WITH FUSION N/A 2/2/2023    Procedure: LAMINECTOMY, SPINE, THORACIC, WITH FUSION (T4-Pelvis fusion w/ T9-10 corpectomies) **AIRO;  Surgeon: Guille Templeton MD;  Location: [facility] OR [facility];  Service: Neurosurgery;  Laterality: N/A;  AIRO, 2 bovies, aquamantys, Globus, Depuy, antibiotic beads, TXA, Peroxide; [employer] plastics closure       Review of patient's allergies indicates:   Allergen Reactions    Bee venom protein (honey bee) Anaphylaxis     Patient reports she is allergic to bee stings.    Naproxen Anaphylaxis     Throat closing    12-23- Patient reports taking Ibuprofen 200 mg at home without problems. Verified X3. [place]    Wasp sting [allergen ext-venom-honey bee] Anaphylaxis    Adhesive Blisters    Shellfish containing products     Iodine and iodide containing products Hives and Itching     Allergic to iodine in seafood only    Nuts [tree nut] Rash       No current facility-administered medications on file prior to encounter.     Current Outpatient Medications on File Prior to Encounter   Medication Sig    bumetanide (BUMEX) 1 MG tablet Take 1 tablet (1 mg total) by mouth once daily.    buprenorphine-naloxone 8-2 mg (SUBOXONE) 8-2 mg Place under the tongue 2 (two) times a day.     DULoxetine (CYMBALTA) 60 MG capsule Take 60 mg by mouth once daily.    hydrOXYzine HCL (ATARAX) 25 MG tablet Take 1 tablet (25 mg total) by mouth 3 (three) times daily as needed for Anxiety.    hydrOXYzine pamoate (VISTARIL) 25 MG Cap Take 25 mg by mouth 2 (two) times daily as needed.    lactulose (CHRONULAC) 20 gram/30 mL Soln Take 30 mLs (20 g total) by mouth 3 (three) times daily.    melatonin (MELATIN) 3 mg tablet Take 2 tablets (6 mg total) by mouth nightly as needed for Insomnia.    pantoprazole (PROTONIX) 40 MG tablet Take 1 tablet (40 mg total) by mouth once daily.    spironolactone (ALDACTONE) 100 MG tablet Take 1 tablet (100 mg total) by mouth once daily.    tiZANidine (ZANAFLEX) 4 MG tablet Take 1 tablet (4 mg total) by mouth 3 (three) times daily as needed (Pain/muscle spasms).    acetaminophen (TYLENOL) 500 MG tablet Take 2 tablets (1,000 mg total) by mouth every 12 (twelve) hours. (Patient not taking: Reported on 6/22/2023)    calcium carbonate (TUMS) 200 mg calcium (500 mg) chewable tablet Take 2 tablets (1,000 mg total) by mouth 3 (three) times daily as needed.    folic acid (FOLVITE) 1 MG tablet Take 1 tablet (1 mg total) by mouth once daily. (Patient taking differently: Take 1 mg by mouth daily as needed (vitamin supplement).)    gabapentin (NEURONTIN) 300 MG capsule Take 3 capsules (900 mg total) by mouth 3 (three) times daily.    ondansetron (ZOFRAN) 4 MG tablet Take 8 mg by mouth 2 (two) times daily as needed for Nausea.    oxyCODONE (ROXICODONE) 5 MG immediate release tablet Take 5 mg by mouth.    polyethylene glycol (GLYCOLAX) 17 gram PwPk Take 17 g by mouth daily as needed. (Patient not taking: Reported on 6/22/2023)    [DISCONTINUED] diclofenac sodium (VOLTAREN) 1 % Gel Apply 2 g topically 4 (four) times daily.     Family History       Problem Relation (Age of Onset)    Cirrhosis Father    Drug abuse Mother, Father    Hepatitis Mother, Father    Liver cancer Mother           Tobacco Use    Smoking status: Some Days     Packs/day: 0.50     Years: 23.00     Pack years: 11.50     Types: Cigarettes    Smokeless tobacco: Never    Tobacco comments:     patient states she knows she nees to quit.   Substance and Sexual Activity    Alcohol use: Not Currently     Comment: quit 2014    Drug use: Not Currently     Frequency: 4.0 times per week     Types: Marijuana     Comment: Patient denies needle use, only inhalation of methampehtamines or orally.  Last known drug use was prior to admission to hospital stay for surgery    Sexual activity: Yes     Partners: Male     Birth control/protection: None     Review of Systems   Constitutional:  Positive for fatigue. Negative for chills and fever.   HENT:  Negative for ear discharge and ear pain.    Eyes:  Negative for pain and discharge.   Respiratory:  Negative for cough and shortness of breath.    Cardiovascular:  Positive for chest pain (mostly associated when other areas in her body are in pain). Negative for leg swelling.   Gastrointestinal:  Negative for nausea and vomiting.   Endocrine: Negative for cold intolerance and heat intolerance.   Genitourinary:  Positive for dysuria. Negative for difficulty urinating.   Musculoskeletal:  Positive for arthralgias and gait problem. Negative for myalgias.        Right arm pain    Skin:  Positive for wound. Negative for rash.   Neurological:  Negative for dizziness and headaches.   Psychiatric/Behavioral:  Negative for agitation and confusion.    Objective:     Vital Signs (Most Recent):  Temp: 98.7 °F (37.1 °C) (07/09/23 0825)  Pulse: 96 (07/09/23 0957)  Resp: 18 (07/09/23 0709)  BP: (!) 108/52 (07/09/23 0709)  SpO2: 98 % (07/09/23 0709) Vital Signs (24h Range):  Temp:  [97.1 °F (36.2 °C)-98.9 °F (37.2 °C)] 98.7 °F (37.1 °C)  Pulse:  [75-96] 96  Resp:  [14-20] 18  SpO2:  [97 %-100 %] 98 %  BP: ()/(47-68) 108/52     Weight: 118.9 kg (262 lb 2 oz)  Body mass index is 41.05 kg/m².     Physical  Exam  Constitutional:       Appearance: Normal appearance.   HENT:      Head: Normocephalic and atraumatic.   Cardiovascular:      Rate and Rhythm: Normal rate and regular rhythm.   Pulmonary:      Effort: Pulmonary effort is normal. No respiratory distress.   Abdominal:      General: Bowel sounds are normal. There is distension.      Palpations: Abdomen is soft.      Tenderness: There is no abdominal tenderness.   Musculoskeletal:         General: Tenderness present.      Right lower leg: No edema.      Left lower leg: No edema.      Comments: RUE tenderness and decreased ROM  She will not cooperate to do a good shoulder exam due to pain    Skin:     General: Skin is warm and dry.      Comments: Multiple wounds on  bilateral heels and sacral region    Neurological:      Mental Status: She is alert and oriented to person, place, and time. Mental status is at baseline.   Psychiatric:         Mood and Affect: Mood normal.         Behavior: Behavior normal.              Significant Labs: A1C: No results for input(s): HGBA1C in the last 4320 hours.  ABGs: No results for input(s): PH, PCO2, HCO3, POCSATURATED, BE, TOTALHB, COHB, METHB, O2HB, POCFIO2, PO2 in the last 48 hours.  Bilirubin:   Recent Labs   Lab 06/15/23  0335 06/16/23  0519 07/06/23  2249 07/07/23  1133 07/08/23  0604   BILITOT 2.4* 2.4* 3.1* 3.2* 3.0*       Blood Culture:   No results for input(s): LABBLOO in the last 48 hours.    BMP:   Recent Labs   Lab 07/08/23  0604   GLU 92   *   K 4.1      CO2 23   BUN 9   CREATININE 0.6   CALCIUM 7.1*   MG 1.8       CBC:   Recent Labs   Lab 07/07/23  1133 07/08/23  0604   WBC 3.53* 3.16*   HGB 7.7* 7.2*   HCT 24.8* 24.0*   PLT 76* 91*       CMP:   Recent Labs   Lab 07/07/23  1133 07/08/23  0604   * 131*   K 4.0 4.1    104   CO2 21* 23   * 92   BUN 7 9   CREATININE 0.5 0.6   CALCIUM 7.2* 7.1*   PROT 6.3 6.0   ALBUMIN 1.4* 1.3*   BILITOT 3.2* 3.0*   ALKPHOS 137* 128   AST 39 37   ALT 16  14   ANIONGAP 7* 4*       Cardiac Markers:   No results for input(s): CKMB, MYOGLOBIN, BNP, TROPISTAT in the last 48 hours.    Coagulation:   No results for input(s): PT, INR, APTT in the last 48 hours.    Lactic Acid:   No results for input(s): LACTATE in the last 48 hours.    Lipase: No results for input(s): LIPASE in the last 48 hours.  Lipid Panel: No results for input(s): CHOL, HDL, LDLCALC, TRIG, CHOLHDL in the last 48 hours.  Magnesium:   Recent Labs   Lab 07/07/23  1133 07/08/23  0604   MG 1.7 1.8       POCT Glucose: No results for input(s): POCTGLUCOSE in the last 48 hours.  Prealbumin: No results for input(s): PREALBUMIN in the last 48 hours.  Respiratory Culture: No results for input(s): GSRESP, RESPIRATORYC in the last 48 hours.  Troponin:   No results for input(s): TROPONINI, TROPONINIHS in the last 48 hours.    TSH: No results for input(s): TSH in the last 4320 hours.  Urine Culture: No results for input(s): LABURIN in the last 48 hours.  Urine Studies:   No results for input(s): COLORU, APPEARANCEUA, PHUR, SPECGRAV, PROTEINUA, GLUCUA, KETONESU, BILIRUBINUA, OCCULTUA, NITRITE, UROBILINOGEN, LEUKOCYTESUR, RBCUA, WBCUA, BACTERIA, SQUAMEPITHEL, HYALINECASTS in the last 48 hours.    Invalid input(s): WRIGHTSUR      Significant Imaging: I have reviewed all pertinent imaging results/findings within the past 24 hours.      Assessment/Plan:      Hypoalbuminemia  Lab Results   Component Value Date    ALBUMIN 1.3 (L) 07/08/2023       Dietician consult--Will add Srinivasa BID per recs     Wounds, multiple  Bilateral heels with escharSacral region    General surgery consulted---santyl ordered. Will likely need to be debrided at some point       Right arm pain  Right arm pain and decreased ROM  XRAY cspine with DJD C3/4/5. This could cause some radicular pain radiating into the shoulder... she is on gabapentin  Her xray of shoulder shows that she has a high riding humeral head, concerning for a supraspinatus tear...she  can address this as an outpt with an MRI    Continue home gabapentin. Order PT for her as outpt depending on her disposition.... trying for LTAC       Hypotension  Component of hypoalbuminemia and suspicion for underlying infection  U/A abnormal but unclear if this is source  Blood cultures pending. If negative at 48-72 hours and vitals remain stable I would feel comfortable with discharge   MAP stable in 70s  7/9---Will give her a 500ml bolus as her systolic dipped to 88 this am      Weakness of both lower extremities  After discitis   Unable to ambulate       Debility  Chronic debility  Patient unable to care for herself  CM working placement to LTACH  PT is seeing her       Substance use disorder  + methamphetamine use on UDS  Cessation resources from  at discharge   Resuming her suboxone here that she takes at home       Cirrhosis of liver with ascites  Monitor AST and ALT  Ok to give her tylenol for pain, as long as she is getting less than 2g/day total         UTI (urinary tract infection)  + nitrites on U/A; WB on microscopic mildly elevated and only trace amount of leukocytes  Will tx due to symptomatic  H/o ESBL klebsiella Pneumoniae  Started IV meropenem, day #3         VTE Risk Mitigation (From admission, onward)         Ordered     IP VTE HIGH RISK PATIENT  Once         07/07/23 0107     Place sequential compression device  Until discontinued         07/07/23 0107                Discharge Planning   SHIRA:      Code Status: Full Code   Is the patient medically ready for discharge?:     Reason for patient still in hospital (select all that apply): Treatment and Pending disposition  Discharge Plan A: Long-term acute care facility (LTAC)                  Brad Zazueta MD  Department of Hospital Medicine   [place] [facility] (3rd Fl)

## 2023-07-09 NOTE — ASSESSMENT & PLAN NOTE
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 This could cause some radicular pain radiating into the shoulder... she is on gabapentin  Her xray of shoulder shows that she has a high riding humeral head, concerning for a supraspinatus tear...she can address this as an outpt with an MRI    Continue home gabapentin. Order PT for her as outpt depending on her disposition.... trying for LTAC

## 2023-07-09 NOTE — ASSESSMENT & PLAN NOTE
+ nitrites on U/A; WB on microscopic mildly elevated and only trace amount of leukocytes  Will tx due to symptomatic  H/o ESBL klebsiella Pneumoniae  Started IV meropenem, day #3

## 2023-07-09 NOTE — SUBJECTIVE & OBJECTIVE
Past Medical History:   Diagnosis Date    Anemia     Anxiety     Depressed     Diskitis     Hep C w/o coma, chronic     IV drug abuse     Kidney stone     Liver cirrhosis        Past Surgical History:   Procedure Laterality Date    ARTHROTOMY OF SHOULDER Left 11/15/2022    Procedure: ARTHROTOMY, SHOULDER;  Surgeon: Bjorn Hayes MD;  Location: [facility];  Service: Orthopedics;  Laterality: Left;  Left acromioclavicular joint arthrotomy    BACK SURGERY      benine tumor removal      forehead, age 9    CHOLECYSTECTOMY      KIDNEY SURGERY      LUMBAR FUSION Bilateral 3/2/2022    Procedure: FUSION, SPINE, LUMBAR;  Surgeon: Guille Templeton MD;  Location: [address];  Service: Neurosurgery;  Laterality: Bilateral;  AIRO  T10--Pelvis    LUMBAR FUSION N/A 1/24/2023    Procedure: **AIRO** T8-T12 POSTERIOR FUSION, T8-T10 LAMINECTOMY, HARDWARE REMOVAL AND WASHOUT;  Surgeon: Guille Templeton MD;  Location: [address];  Service: Neurosurgery;  Laterality: N/A;    REMOVAL OF HARDWARE FROM SPINE N/A 2/21/2022    Procedure: REMOVAL, HARDWARE, SPINE;  Surgeon: Guille Templeton MD;  Location: [address];  Service: Neurosurgery;  Laterality: N/A;  [place]    SPINE SURGERY      THORACIC LAMINECTOMY WITH FUSION N/A 2/2/2023    Procedure: LAMINECTOMY, SPINE, THORACIC, WITH FUSION (T4-Pelvis fusion w/ T9-10 corpectomies) **AIRO;  Surgeon: Guille Templeton MD;  Location: [address];  Service: Neurosurgery;  Laterality: N/A;  AIRO, 2 bovies, aquamantys, Globus, Depuy, antibiotic beads, TXA, Peroxide; [employer] plastics closure       Review of patient's allergies indicates:   Allergen Reactions    Bee venom protein (honey bee) Anaphylaxis     Patient reports she is allergic to bee stings.    Naproxen Anaphylaxis     Throat closing    12-23- Patient reports taking Ibuprofen 200 mg at home without problems. Verified X3. [place]    Wasp sting [allergen ext-venom-honey bee] Anaphylaxis    Adhesive Blisters    Shellfish containing products      Iodine and iodide containing products Hives and Itching     Allergic to iodine in seafood only    Nuts [tree nut] Rash       No current facility-administered medications on file prior to encounter.     Current Outpatient Medications on File Prior to Encounter   Medication Sig    bumetanide (BUMEX) 1 MG tablet Take 1 tablet (1 mg total) by mouth once daily.    buprenorphine-naloxone 8-2 mg (SUBOXONE) 8-2 mg Place under the tongue 2 (two) times a day.    DULoxetine (CYMBALTA) 60 MG capsule Take 60 mg by mouth once daily.    hydrOXYzine HCL (ATARAX) 25 MG tablet Take 1 tablet (25 mg total) by mouth 3 (three) times daily as needed for Anxiety.    hydrOXYzine pamoate (VISTARIL) 25 MG Cap Take 25 mg by mouth 2 (two) times daily as needed.    lactulose (CHRONULAC) 20 gram/30 mL Soln Take 30 mLs (20 g total) by mouth 3 (three) times daily.    melatonin (MELATIN) 3 mg tablet Take 2 tablets (6 mg total) by mouth nightly as needed for Insomnia.    pantoprazole (PROTONIX) 40 MG tablet Take 1 tablet (40 mg total) by mouth once daily.    spironolactone (ALDACTONE) 100 MG tablet Take 1 tablet (100 mg total) by mouth once daily.    tiZANidine (ZANAFLEX) 4 MG tablet Take 1 tablet (4 mg total) by mouth 3 (three) times daily as needed (Pain/muscle spasms).    acetaminophen (TYLENOL) 500 MG tablet Take 2 tablets (1,000 mg total) by mouth every 12 (twelve) hours. (Patient not taking: Reported on 6/22/2023)    calcium carbonate (TUMS) 200 mg calcium (500 mg) chewable tablet Take 2 tablets (1,000 mg total) by mouth 3 (three) times daily as needed.    folic acid (FOLVITE) 1 MG tablet Take 1 tablet (1 mg total) by mouth once daily. (Patient taking differently: Take 1 mg by mouth daily as needed (vitamin supplement).)    gabapentin (NEURONTIN) 300 MG capsule Take 3 capsules (900 mg total) by mouth 3 (three) times daily.    ondansetron (ZOFRAN) 4 MG tablet Take 8 mg by mouth 2 (two) times daily as needed for Nausea.    oxyCODONE  (ROXICODONE) 5 MG immediate release tablet Take 5 mg by mouth.    polyethylene glycol (GLYCOLAX) 17 gram PwPk Take 17 g by mouth daily as needed. (Patient not taking: Reported on 6/22/2023)    [DISCONTINUED] diclofenac sodium (VOLTAREN) 1 % Gel Apply 2 g topically 4 (four) times daily.     Family History       Problem Relation (Age of Onset)    Cirrhosis Father    Drug abuse Mother, Father    Hepatitis Mother, Father    Liver cancer Mother          Tobacco Use    Smoking status: Some Days     Packs/day: 0.50     Years: 23.00     Pack years: 11.50     Types: Cigarettes    Smokeless tobacco: Never    Tobacco comments:     patient states she knows she nees to quit.   Substance and Sexual Activity    Alcohol use: Not Currently     Comment: quit 2014    Drug use: Not Currently     Frequency: 4.0 times per week     Types: Marijuana     Comment: Patient denies needle use, only inhalation of methampehtamines or orally.  Last known drug use was prior to admission to hospital stay for surgery    Sexual activity: Yes     Partners: Male     Birth control/protection: None     Review of Systems   Constitutional:  Positive for fatigue. Negative for chills and fever.   HENT:  Negative for ear discharge and ear pain.    Eyes:  Negative for pain and discharge.   Respiratory:  Negative for cough and shortness of breath.    Cardiovascular:  Positive for chest pain (mostly associated when other areas in her body are in pain). Negative for leg swelling.   Gastrointestinal:  Negative for nausea and vomiting.   Endocrine: Negative for cold intolerance and heat intolerance.   Genitourinary:  Positive for dysuria. Negative for difficulty urinating.   Musculoskeletal:  Positive for arthralgias and gait problem. Negative for myalgias.        Right arm pain    Skin:  Positive for wound. Negative for rash.   Neurological:  Negative for dizziness and headaches.   Psychiatric/Behavioral:  Negative for agitation and confusion.    Objective:      Vital Signs (Most Recent):  Temp: 98.7 °F (37.1 °C) (07/09/23 0825)  Pulse: 96 (07/09/23 0957)  Resp: 18 (07/09/23 0709)  BP: (!) 108/52 (07/09/23 0709)  SpO2: 98 % (07/09/23 0709) Vital Signs (24h Range):  Temp:  [97.1 °F (36.2 °C)-98.9 °F (37.2 °C)] 98.7 °F (37.1 °C)  Pulse:  [75-96] 96  Resp:  [14-20] 18  SpO2:  [97 %-100 %] 98 %  BP: ()/(47-68) 108/52     Weight: 118.9 kg (262 lb 2 oz)  Body mass index is 41.05 kg/m².     Physical Exam  Constitutional:       Appearance: Normal appearance.   HENT:      Head: Normocephalic and atraumatic.   Cardiovascular:      Rate and Rhythm: Normal rate and regular rhythm.   Pulmonary:      Effort: Pulmonary effort is normal. No respiratory distress.   Abdominal:      General: Bowel sounds are normal. There is distension.      Palpations: Abdomen is soft.      Tenderness: There is no abdominal tenderness.   Musculoskeletal:         General: Tenderness present.      Right lower leg: No edema.      Left lower leg: No edema.      Comments: RUE tenderness and decreased ROM  She will not cooperate to do a good shoulder exam due to pain    Skin:     General: Skin is warm and dry.      Comments: Multiple wounds on  bilateral heels and sacral region    Neurological:      Mental Status: She is alert and oriented to person, place, and time. Mental status is at baseline.   Psychiatric:         Mood and Affect: Mood normal.         Behavior: Behavior normal.              Significant Labs: A1C: No results for input(s): HGBA1C in the last 4320 hours.  ABGs: No results for input(s): PH, PCO2, HCO3, POCSATURATED, BE, TOTALHB, COHB, METHB, O2HB, POCFIO2, PO2 in the last 48 hours.  Bilirubin:   Recent Labs   Lab 06/15/23  0335 06/16/23  0519 07/06/23  2249 07/07/23  1133 07/08/23  0604   BILITOT 2.4* 2.4* 3.1* 3.2* 3.0*       Blood Culture:   No results for input(s): LABBLOO in the last 48 hours.    BMP:   Recent Labs   Lab 07/08/23  0604   GLU 92   *   K 4.1      CO2 23    BUN 9   CREATININE 0.6   CALCIUM 7.1*   MG 1.8       CBC:   Recent Labs   Lab 07/07/23  1133 07/08/23  0604   WBC 3.53* 3.16*   HGB 7.7* 7.2*   HCT 24.8* 24.0*   PLT 76* 91*       CMP:   Recent Labs   Lab 07/07/23  1133 07/08/23  0604   * 131*   K 4.0 4.1    104   CO2 21* 23   * 92   BUN 7 9   CREATININE 0.5 0.6   CALCIUM 7.2* 7.1*   PROT 6.3 6.0   ALBUMIN 1.4* 1.3*   BILITOT 3.2* 3.0*   ALKPHOS 137* 128   AST 39 37   ALT 16 14   ANIONGAP 7* 4*       Cardiac Markers:   No results for input(s): CKMB, MYOGLOBIN, BNP, TROPISTAT in the last 48 hours.    Coagulation:   No results for input(s): PT, INR, APTT in the last 48 hours.    Lactic Acid:   No results for input(s): LACTATE in the last 48 hours.    Lipase: No results for input(s): LIPASE in the last 48 hours.  Lipid Panel: No results for input(s): CHOL, HDL, LDLCALC, TRIG, CHOLHDL in the last 48 hours.  Magnesium:   Recent Labs   Lab 07/07/23  1133 07/08/23  0604   MG 1.7 1.8       POCT Glucose: No results for input(s): POCTGLUCOSE in the last 48 hours.  Prealbumin: No results for input(s): PREALBUMIN in the last 48 hours.  Respiratory Culture: No results for input(s): GSRESP, RESPIRATORYC in the last 48 hours.  Troponin:   No results for input(s): TROPONINI, TROPONINIHS in the last 48 hours.    TSH: No results for input(s): TSH in the last 4320 hours.  Urine Culture: No results for input(s): LABURIN in the last 48 hours.  Urine Studies:   No results for input(s): COLORU, APPEARANCEUA, PHUR, SPECGRAV, PROTEINUA, GLUCUA, KETONESU, BILIRUBINUA, OCCULTUA, NITRITE, UROBILINOGEN, LEUKOCYTESUR, RBCUA, WBCUA, BACTERIA, SQUAMEPITHEL, HYALINECASTS in the last 48 hours.    Invalid input(s): TREY      Significant Imaging: I have reviewed all pertinent imaging results/findings within the past 24 hours.

## 2023-07-09 NOTE — ASSESSMENT & PLAN NOTE
Component of hypoalbuminemia and suspicion for underlying infection  U/A abnormal but unclear if this is source  Blood cultures pending. If negative at 48-72 hours and vitals remain stable I would feel comfortable with discharge   MAP stable in 70s  7/9---Will give her a 500ml bolus as her systolic dipped to 88 this am

## 2023-07-10 ENCOUNTER — PATIENT MESSAGE (OUTPATIENT)
Dept: ADMINISTRATIVE | Facility: HOSPITAL | Age: 43
End: 2023-07-10
Payer: MEDICAID

## 2023-07-10 ENCOUNTER — PATIENT MESSAGE (OUTPATIENT)
Dept: PRIMARY CARE CLINIC | Facility: CLINIC | Age: 43
End: 2023-07-10
Payer: MEDICAID

## 2023-07-10 LAB
ABO + RH BLD: NORMAL
ALBUMIN SERPL BCP-MCNC: 1.4 G/DL (ref 3.5–5.2)
ALP SERPL-CCNC: 149 U/L (ref 55–135)
ALT SERPL W/O P-5'-P-CCNC: 10 U/L (ref 10–44)
ANION GAP SERPL CALC-SCNC: 1 MMOL/L (ref 8–16)
AST SERPL-CCNC: 33 U/L (ref 10–40)
BACTERIA #/AREA URNS HPF: NORMAL /HPF
BASOPHILS # BLD AUTO: 0.03 K/UL (ref 0–0.2)
BASOPHILS NFR BLD: 0.9 % (ref 0–1.9)
BILIRUB SERPL-MCNC: 2.4 MG/DL (ref 0.1–1)
BILIRUB UR QL STRIP: NEGATIVE
BLD GP AB SCN CELLS X3 SERPL QL: NORMAL
BLD PROD TYP BPU: NORMAL
BLOOD UNIT EXPIRATION DATE: NORMAL
BLOOD UNIT TYPE CODE: 6200
BLOOD UNIT TYPE: NORMAL
BUN SERPL-MCNC: 18 MG/DL (ref 6–20)
CALCIUM SERPL-MCNC: 7.4 MG/DL (ref 8.7–10.5)
CHLORIDE SERPL-SCNC: 101 MMOL/L (ref 95–110)
CLARITY UR: CLEAR
CO2 SERPL-SCNC: 26 MMOL/L (ref 23–29)
CODING SYSTEM: NORMAL
COLOR UR: YELLOW
CREAT SERPL-MCNC: 0.6 MG/DL (ref 0.5–1.4)
CROSSMATCH INTERPRETATION: NORMAL
DIFFERENTIAL METHOD: ABNORMAL
DISPENSE STATUS: NORMAL
EOSINOPHIL # BLD AUTO: 0.1 K/UL (ref 0–0.5)
EOSINOPHIL NFR BLD: 1.7 % (ref 0–8)
ERYTHROCYTE [DISTWIDTH] IN BLOOD BY AUTOMATED COUNT: 20.1 % (ref 11.5–14.5)
EST. GFR  (NO RACE VARIABLE): >60 ML/MIN/1.73 M^2
GLUCOSE SERPL-MCNC: 93 MG/DL (ref 70–110)
GLUCOSE UR QL STRIP: NEGATIVE
HCT VFR BLD AUTO: 24.5 % (ref 37–48.5)
HGB BLD-MCNC: 7.3 G/DL (ref 12–16)
HGB UR QL STRIP: ABNORMAL
IMM GRANULOCYTES # BLD AUTO: 0.06 K/UL (ref 0–0.04)
IMM GRANULOCYTES NFR BLD AUTO: 1.7 % (ref 0–0.5)
KETONES UR QL STRIP: NEGATIVE
LEUKOCYTE ESTERASE UR QL STRIP: ABNORMAL
LYMPHOCYTES # BLD AUTO: 0.7 K/UL (ref 1–4.8)
LYMPHOCYTES NFR BLD: 19 % (ref 18–48)
MCH RBC QN AUTO: 30.3 PG (ref 27–31)
MCHC RBC AUTO-ENTMCNC: 29.8 G/DL (ref 32–36)
MCV RBC AUTO: 102 FL (ref 82–98)
MICROSCOPIC COMMENT: NORMAL
MONOCYTES # BLD AUTO: 0.3 K/UL (ref 0.3–1)
MONOCYTES NFR BLD: 9.3 % (ref 4–15)
NEUTROPHILS # BLD AUTO: 2.3 K/UL (ref 1.8–7.7)
NEUTROPHILS NFR BLD: 67.4 % (ref 38–73)
NITRITE UR QL STRIP: NEGATIVE
NRBC BLD-RTO: 0 /100 WBC
NUM UNITS TRANS PACKED RBC: NORMAL
PH UR STRIP: 7 [PH] (ref 5–8)
PLATELET # BLD AUTO: 94 K/UL (ref 150–450)
PMV BLD AUTO: 8.2 FL (ref 9.2–12.9)
POTASSIUM SERPL-SCNC: 5.2 MMOL/L (ref 3.5–5.1)
PROT SERPL-MCNC: 6.6 G/DL (ref 6–8.4)
PROT UR QL STRIP: NEGATIVE
RBC # BLD AUTO: 2.41 M/UL (ref 4–5.4)
RBC #/AREA URNS HPF: 1 /HPF (ref 0–4)
SODIUM SERPL-SCNC: 128 MMOL/L (ref 136–145)
SP GR UR STRIP: 1.01 (ref 1–1.03)
SPECIMEN OUTDATE: NORMAL
URN SPEC COLLECT METH UR: ABNORMAL
UROBILINOGEN UR STRIP-ACNC: ABNORMAL EU/DL
WBC # BLD AUTO: 3.43 K/UL (ref 3.9–12.7)
WBC #/AREA URNS HPF: 1 /HPF (ref 0–5)

## 2023-07-10 PROCEDURE — 82024 ASSAY OF ACTH: CPT | Performed by: FAMILY MEDICINE

## 2023-07-10 PROCEDURE — 36415 COLL VENOUS BLD VENIPUNCTURE: CPT | Performed by: FAMILY MEDICINE

## 2023-07-10 PROCEDURE — 63600175 PHARM REV CODE 636 W HCPCS: Performed by: FAMILY MEDICINE

## 2023-07-10 PROCEDURE — 63600175 PHARM REV CODE 636 W HCPCS: Performed by: SURGERY

## 2023-07-10 PROCEDURE — 82533 TOTAL CORTISOL: CPT | Performed by: FAMILY MEDICINE

## 2023-07-10 PROCEDURE — 36415 COLL VENOUS BLD VENIPUNCTURE: CPT | Performed by: PHYSICIAN ASSISTANT

## 2023-07-10 PROCEDURE — 25000003 PHARM REV CODE 250: Performed by: SURGERY

## 2023-07-10 PROCEDURE — 85025 COMPLETE CBC W/AUTO DIFF WBC: CPT | Performed by: FAMILY MEDICINE

## 2023-07-10 PROCEDURE — 25000003 PHARM REV CODE 250: Performed by: PHYSICIAN ASSISTANT

## 2023-07-10 PROCEDURE — 21400001 HC TELEMETRY ROOM

## 2023-07-10 PROCEDURE — 81000 URINALYSIS NONAUTO W/SCOPE: CPT | Performed by: PHYSICIAN ASSISTANT

## 2023-07-10 PROCEDURE — 97110 THERAPEUTIC EXERCISES: CPT

## 2023-07-10 PROCEDURE — 99232 PR SUBSEQUENT HOSPITAL CARE,LEVL II: ICD-10-PCS | Mod: 95,,, | Performed by: PHYSICIAN ASSISTANT

## 2023-07-10 PROCEDURE — P9016 RBC LEUKOCYTES REDUCED: HCPCS | Performed by: PHYSICIAN ASSISTANT

## 2023-07-10 PROCEDURE — 86920 COMPATIBILITY TEST SPIN: CPT | Performed by: PHYSICIAN ASSISTANT

## 2023-07-10 PROCEDURE — 25000003 PHARM REV CODE 250: Performed by: FAMILY MEDICINE

## 2023-07-10 PROCEDURE — 80053 COMPREHEN METABOLIC PANEL: CPT | Performed by: FAMILY MEDICINE

## 2023-07-10 PROCEDURE — 86900 BLOOD TYPING SEROLOGIC ABO: CPT | Performed by: PHYSICIAN ASSISTANT

## 2023-07-10 PROCEDURE — 36430 TRANSFUSION BLD/BLD COMPNT: CPT

## 2023-07-10 PROCEDURE — 27000207 HC ISOLATION

## 2023-07-10 PROCEDURE — 11000001 HC ACUTE MED/SURG PRIVATE ROOM

## 2023-07-10 PROCEDURE — 94761 N-INVAS EAR/PLS OXIMETRY MLT: CPT

## 2023-07-10 PROCEDURE — 99232 SBSQ HOSP IP/OBS MODERATE 35: CPT | Mod: 95,,, | Performed by: PHYSICIAN ASSISTANT

## 2023-07-10 RX ORDER — DULOXETIN HYDROCHLORIDE 30 MG/1
60 CAPSULE, DELAYED RELEASE ORAL DAILY
Status: DISCONTINUED | OUTPATIENT
Start: 2023-07-10 | End: 2023-07-12

## 2023-07-10 RX ORDER — HYDROCODONE BITARTRATE AND ACETAMINOPHEN 500; 5 MG/1; MG/1
TABLET ORAL
Status: DISCONTINUED | OUTPATIENT
Start: 2023-07-10 | End: 2023-07-21 | Stop reason: HOSPADM

## 2023-07-10 RX ADMIN — COLLAGENASE SANTYL: 250 OINTMENT TOPICAL at 01:07

## 2023-07-10 RX ADMIN — ACETAMINOPHEN 500 MG: 500 TABLET ORAL at 02:07

## 2023-07-10 RX ADMIN — DULOXETINE 60 MG: 30 CAPSULE, DELAYED RELEASE ORAL at 01:07

## 2023-07-10 RX ADMIN — PANTOPRAZOLE SODIUM 40 MG: 40 TABLET, DELAYED RELEASE ORAL at 10:07

## 2023-07-10 RX ADMIN — MEROPENEM 2 G: 1 INJECTION, POWDER, FOR SOLUTION INTRAVENOUS at 07:07

## 2023-07-10 RX ADMIN — BUPRENORPHINE AND NALOXONE 8 MG: 8; 2 TABLET SUBLINGUAL at 10:07

## 2023-07-10 RX ADMIN — BUPRENORPHINE AND NALOXONE 8 MG: 8; 2 TABLET SUBLINGUAL at 09:07

## 2023-07-10 RX ADMIN — GABAPENTIN 300 MG: 300 CAPSULE ORAL at 09:07

## 2023-07-10 RX ADMIN — GABAPENTIN 300 MG: 300 CAPSULE ORAL at 10:07

## 2023-07-10 RX ADMIN — ONDANSETRON HYDROCHLORIDE 4 MG: 2 SOLUTION INTRAMUSCULAR; INTRAVENOUS at 04:07

## 2023-07-10 RX ADMIN — TIZANIDINE 4 MG: 2 TABLET ORAL at 07:07

## 2023-07-10 RX ADMIN — GABAPENTIN 300 MG: 300 CAPSULE ORAL at 04:07

## 2023-07-10 RX ADMIN — LACTULOSE 30 G: 20 SOLUTION ORAL at 10:07

## 2023-07-10 NOTE — PT/OT/SLP PROGRESS
"Physical Therapy Treatment    Patient Name:  Rachel Zazueta   MRN:  [id number]    Recommendations:     Discharge Recommendations: LTACH ([facility]), nursing facility, basic, nursing facility, skilled  Discharge Equipment Recommendations: none  Barriers to discharge: Inaccessible home and Decreased caregiver support    Assessment:     Rachel Zazueta is a 42 y.o. female admitted with a medical diagnosis of <principal problem not specified>.  She presents with the following impairments/functional limitations: weakness, impaired endurance, impaired self care skills, impaired functional mobility, gait instability, impaired balance, decreased upper extremity function, decreased lower extremity function, decreased safety awareness, pain, decreased ROM, impaired skin, impaired muscle length, impaired joint extensibility. Patient has limited tolerance with ROM ex on B LE and right UE due to patient is crying with pain on bilateral legs/feet and right shoulder upon active movement.  Nursing made aware.    Rehab Prognosis: Fair; patient would benefit from acute skilled PT services to address these deficits and reach maximum level of function.    Recent Surgery: * No surgery found *      Plan:     During this hospitalization, patient to be seen 5 x/week to address the identified rehab impairments via therapeutic activities, therapeutic exercises and progress toward the following goals:    Plan of Care Expires:  07/14/23    Subjective     Chief Complaint: Patient stated, "the tylenol won't help my pain".   Patient/Family Comments/goals: " to dec the pain".  Pain/Comfort:  Pain Rating 1: 10/10  Location - Side 1: Bilateral  Location - Orientation 1: generalized  Location 1: other (see comments) (legs, feet and right shoulder)  Pain Addressed 1: Reposition, Cessation of Activity  Pain Rating Post-Intervention 1: 10/10      Objective:     Communicated with patient  prior to session.  Patient found supine " with gu catheter, peripheral IV, Other (comments) (bilateral heel protector) upon PT entry to room.     General Precautions: Standard, contact, fall  Orthopedic Precautions: N/A  Braces: N/A  Respiratory Status: Room air     Functional Mobility:  .AAROM - AROM ex on B LE/UE x 5 - 7 reps  . Unable to perform bed mobility due to pt is crying with pain.      AM-PAC 6 CLICK MOBILITY  Turning over in bed (including adjusting bedclothes, sheets and blankets)?: 2  Sitting down on and standing up from a chair with arms (e.g., wheelchair, bedside commode, etc.): 1  Moving from lying on back to sitting on the side of the bed?: 1  Moving to and from a bed to a chair (including a wheelchair)?: 1  Need to walk in hospital room?: 1  Climbing 3-5 steps with a railing?: 1  Basic Mobility Total Score: 7       Treatment & Education:  Performed AAROM - AROM ex on B LE/UE x 5 - 7 reps with rest periods frequently due to pain on legs/feet and right shoulder.    Patient left supine with all lines intact, call button in reach, bed alarm on, and nursing notified..    GOALS:   Multidisciplinary Problems       Physical Therapy Goals          Problem: Physical Therapy    Goal Priority Disciplines Outcome Goal Variances Interventions   Physical Therapy Goal     PT, PT/OT      Description:   Patient will increase functional independence with mobility by performin. Pt able to perform and tolerate B LE ROM ex x 10 reps on each plane to inc mobility, inc body repositioning  and prevent in declining in functions.  2. Inc rolling to sides to minimal assistance and cues  3. Able to perform and tolerate  supine <>sit with Moderate Assistance and cues.  4. Able to perform and tolerate static sit at edge of the bed x ~10 minutes for inc participation and performance with self care activities.  5. Establish home ex program.                          Time Tracking:     PT Received On: 07/10/23  PT Start Time: 917     PT Stop Time: 927  PT Total  Time (min): 10 min     Billable Minutes: Therapeutic Exercise 10    Treatment Type: Treatment  PT/PTA: PT           07/10/2023

## 2023-07-10 NOTE — ASSESSMENT & PLAN NOTE
Component of hypoalbuminemia and suspicion for underlying infection  U/A abnormal but unclear if this is source  Blood cultures pending. If negative at 48-72 hours and vitals remain stable I would feel comfortable with discharge   MAP stable in 70s  7/9---Will give her a 500ml bolus as her systolic dipped to 88 this am    1 unit of pRBC, decrease gabapentin

## 2023-07-10 NOTE — ASSESSMENT & PLAN NOTE
+ nitrites on U/A; WB on microscopic mildly elevated and only trace amount of leukocytes  Will tx due to symptomatic  H/o ESBL klebsiella Pneumoniae  D/c IV meropenem.  Low suspicion for UTI.  U/A did not reflex.

## 2023-07-10 NOTE — ASSESSMENT & PLAN NOTE
Lab Results   Component Value Date    ALBUMIN 1.4 (L) 07/10/2023       Dietician consult--Will add Srinivasa BID per recs

## 2023-07-10 NOTE — PLAN OF CARE
07/10/23 0834   Post-Acute Status   Post-Acute Authorization Placement   Post-Acute Placement Status Referrals Sent   Discharge Delays None known at this time       Patient remains awaiting placement. Referrals sent to the following LTACs:     [facility]--Interested  [facility]--Declined (Not taking Medicaid at this time)  [facility]--Declined (Out of Network)   [facility]--Referral received    Awaiting responses.     [id number]--ALICIA received message through [facility] that [facility] is interested in the patient and would potentially like to submit for LTAC authorization with payor. ALICIA was asked to call point of contact. ALICIA called RAUL Ledbetter Clinical Liason at [facility]. No answer, message left for a return call.     [id number]--Khloe with [facility] returned  call. [facility] will be submitting to [employer] for authorization. She will present to [facility] tomorrow morning to discuss placement with patient. Nursing informed.

## 2023-07-10 NOTE — PROGRESS NOTES
[place] [facility] Surg ([facility])  [facility] Medicine  Progress Note    Patient Name: Rachel Zazueta  MRN: [id number]  Patient Class: IP- Inpatient   Admission Date: 7/6/2023  Length of Stay: 3 days  Attending Physician: Brad aZzueta MD  Primary Care Provider: Dannielle Merino DO        Subjective:     Principal Problem:<principal problem not specified>        HPI:  Patient is a 42 year old female with medical history of anxiety, depression, substance use disorder (+ meth on UDS), hepatitis C with liver cirrhosis and TIPS procedure, spinal fusions and discits who presented to the ED with right arm pain.  Found to be hypotensive.  She has had dysuria but denies nausea, vomiting and abdominal pain.  Right arm pain started when she fell out of her wheel chair one week ago.  She is having difficulty moving it.  Pain is in elbow and shoulder.  She has intermittent chest pain that occurs mostly when she is pain elsewhere.  She has been unable to ambulate after discitis and has sores on her heels.          Admitted for Right arm pain and hypotension.        Overview/Hospital Course:  BP is stable, no MAP less than 65, most readings in the 70s   Afebrile   Blood Cx NGTD   Xrays with significant DJD C3/4/5  Shoulder xray with high riding humeral head suggesting a supraspinatous tear, but she is in too much pain to examine. Will likely need an MRI as outpt.    7/9  MAP is 65-84  UOP 36cc/hr  Remains Afebrile   Blood Cx NGTD     PT blood pressure on lower side.  Will decrease gabapentin.  H/H is low and 1 unit of pRBC ordered.  Risks and benefits discussed with patient.  Low suspicion for infection and IV meropenem discontinued.  I believe hypotension is due to volume depletion due to hypoalbuminemia and anemia of chronic disease.  Goal map greater than 65.        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9    CHOLECYSTECTOMY      KIDNEY SURGERY      LUMBAR FUSION Bilateral 3/2/2022    Procedure: FUSION, SPINE, LUMBAR;  Surgeon: Guille Templeton MD;  Location: [facility] OR [address] [place] [address];  Service: Neurosurgery;  Laterality: Bilateral;  AIRO  T10--Pelvis    LUMBAR FUSION N/A 1/24/2023    Procedure: **AIRO** T8-T12 POSTERIOR FUSION, T8-T10 LAMINECTOMY, HARDWARE REMOVAL AND WASHOUT;  Surgeon: Guille Templeton MD;  Location: [facility] OR [address] [place] [address];  Service: Neurosurgery;  Laterality: N/A;    REMOVAL OF HARDWARE FROM SPINE N/A 2/21/2022    Procedure: REMOVAL, HARDWARE, SPINE;  Surgeon: Guille Templeton MD;  [facility];  Service: Neurosurgery;  Laterality: N/A;  [place]    SPINE SURGERY      THORACIC LAMINECTOMY WITH FUSION N/A 2/2/2023    Procedure: LAMINECTOMY, SPINE, THORACIC, WITH FUSION (T4-Pelvis fusion w/ T9-10 corpectomies) **AIRO;  Surgeon: Guille Templeton MD;  [facility];  Service: Neurosurgery;  Laterality: N/A;  AIRO, 2 bovies, aquamantys, Globus, Depuy, antibiotic beads, TXA, Peroxide; Babycos plastics closure       Review of patient's allergies indicates:   Allergen Reactions    Bee venom protein (honey bee) Anaphylaxis     Patient reports she is allergic to bee stings.    Naproxen Anaphylaxis     Throat closing    12-23- Patient reports taking Ibuprofen 200 mg at home without problems. Verified X3. [place]    Wasp sting [allergen ext-venom-honey bee] Anaphylaxis    Adhesive Blisters    Shellfish containing products     Iodine and iodide containing products Hives and Itching     Allergic to iodine in seafood only    Nuts [tree nut] Rash       No  current facility-administered medications on file prior to encounter.     Current Outpatient Medications on File Prior to Encounter   Medication Sig    bumetanide (BUMEX) 1 MG tablet Take 1 tablet (1 mg total) by mouth once daily.    buprenorphine-naloxone 8-2 mg (SUBOXONE) 8-2 mg Place under the tongue 2 (two) times a day.    DULoxetine (CYMBALTA) 60 MG capsule Take 60 mg by mouth once daily.    hydrOXYzine HCL (ATARAX) 25 MG tablet Take 1 tablet (25 mg total) by mouth 3 (three) times daily as needed for Anxiety.    hydrOXYzine pamoate (VISTARIL) 25 MG Cap Take 25 mg by mouth 2 (two) times daily as needed.    lactulose (CHRONULAC) 20 gram/30 mL Soln Take 30 mLs (20 g total) by mouth 3 (three) times daily.    melatonin (MELATIN) 3 mg tablet Take 2 tablets (6 mg total) by mouth nightly as needed for Insomnia.    pantoprazole (PROTONIX) 40 MG tablet Take 1 tablet (40 mg total) by mouth once daily.    spironolactone (ALDACTONE) 100 MG tablet Take 1 tablet (100 mg total) by mouth once daily.    tiZANidine (ZANAFLEX) 4 MG tablet Take 1 tablet (4 mg total) by mouth 3 (three) times daily as needed (Pain/muscle spasms).    acetaminophen (TYLENOL) 500 MG tablet Take 2 tablets (1,000 mg total) by mouth every 12 (twelve) hours. (Patient not taking: Reported on 6/22/2023)    calcium carbonate (TUMS) 200 mg calcium (500 mg) chewable tablet Take 2 tablets (1,000 mg total) by mouth 3 (three) times daily as needed.    folic acid (FOLVITE) 1 MG tablet Take 1 tablet (1 mg total) by mouth once daily. (Patient taking differently: Take 1 mg by mouth daily as needed (vitamin supplement).)    gabapentin (NEURONTIN) 300 MG capsule Take 3 capsules (900 mg total) by mouth 3 (three) times daily.    ondansetron (ZOFRAN) 4 MG tablet Take 8 mg by mouth 2 (two) times daily as needed for Nausea.    oxyCODONE (ROXICODONE) 5 MG immediate release tablet Take 5 mg by mouth.    polyethylene glycol (GLYCOLAX) 17 gram PwPk Take 17 g by  mouth daily as needed. (Patient not taking: Reported on 6/22/2023)    [DISCONTINUED] diclofenac sodium (VOLTAREN) 1 % Gel Apply 2 g topically 4 (four) times daily.     Family History       Problem Relation (Age of Onset)    Cirrhosis Father    Drug abuse Mother, Father    Hepatitis Mother, Father    Liver cancer Mother          Tobacco Use    Smoking status: Some Days     Packs/day: 0.50     Years: 23.00     Pack years: 11.50     Types: Cigarettes    Smokeless tobacco: Never    Tobacco comments:     patient states she knows she nees to quit.   Substance and Sexual Activity    Alcohol use: Not Currently     Comment: quit 2014    Drug use: Not Currently     Frequency: 4.0 times per week     Types: Marijuana     Comment: Patient denies needle use, only inhalation of methampehtamines or orally.  Last known drug use was prior to admission to hospital stay for surgery    Sexual activity: Yes     Partners: Male     Birth control/protection: None     Review of Systems   Constitutional:  Positive for fatigue. Negative for chills and fever.   HENT:  Negative for ear discharge and ear pain.    Eyes:  Negative for pain and discharge.   Respiratory:  Negative for cough and shortness of breath.    Cardiovascular:  Negative for chest pain (mostly associated when other areas in her body are in pain) and leg swelling.   Gastrointestinal:  Negative for nausea and vomiting.   Endocrine: Negative for cold intolerance and heat intolerance.   Genitourinary:  Negative for difficulty urinating and dysuria.   Musculoskeletal:  Positive for arthralgias and gait problem. Negative for myalgias.        Right arm pain    Skin:  Positive for wound. Negative for rash.   Neurological:  Negative for dizziness and headaches.   Psychiatric/Behavioral:  Negative for agitation and confusion.    Objective:     Vital Signs (Most Recent):  Temp: 98.1 °F (36.7 °C) (07/10/23 1127)  Pulse: 79 (07/10/23 1200)  Resp: 16 (07/10/23 1127)  BP: (!) 90/52  (07/10/23 1127)  SpO2: 99 % (07/10/23 1127) Vital Signs (24h Range):  Temp:  [97.1 °F (36.2 °C)-98.6 °F (37 °C)] 98.1 °F (36.7 °C)  Pulse:  [68-85] 79  Resp:  [16-20] 16  SpO2:  [97 %-100 %] 99 %  BP: ()/(50-57) 90/52     Weight: 118.9 kg (262 lb 2 oz)  Body mass index is 41.05 kg/m².     Physical Exam  Constitutional:       Appearance: Normal appearance.      Comments: Continued generalized pain     HENT:      Head: Normocephalic and atraumatic.   Cardiovascular:      Rate and Rhythm: Normal rate and regular rhythm.   Pulmonary:      Effort: Pulmonary effort is normal. No respiratory distress.   Abdominal:      General: Bowel sounds are normal. There is no distension.      Palpations: Abdomen is soft.      Tenderness: There is no abdominal tenderness.   Musculoskeletal:         General: Tenderness present.      Right lower leg: No edema.      Left lower leg: No edema.      Comments: RUE tenderness and decreased ROM  She will not cooperate to do a good shoulder exam due to pain   ROM improving today    Skin:     General: Skin is warm and dry.      Comments: Multiple wounds on  bilateral heels and sacral region    Neurological:      Mental Status: She is alert and oriented to person, place, and time. Mental status is at baseline.   Psychiatric:         Mood and Affect: Mood normal.         Behavior: Behavior normal.              Significant Labs: A1C: No results for input(s): HGBA1C in the last 4320 hours.  ABGs: No results for input(s): PH, PCO2, HCO3, POCSATURATED, BE, TOTALHB, COHB, METHB, O2HB, POCFIO2, PO2 in the last 48 hours.  Bilirubin:   Recent Labs   Lab 06/16/23  0519 07/06/23  2249 07/07/23  1133 07/08/23  0604 07/10/23  0559   BILITOT 2.4* 3.1* 3.2* 3.0* 2.4*       Blood Culture:   No results for input(s): LABBLOO in the last 48 hours.    BMP:   Recent Labs   Lab 07/10/23  0559   GLU 93   *   K 5.2*      CO2 26   BUN 18   CREATININE 0.6   CALCIUM 7.4*       CBC:   Recent Labs   Lab  07/10/23  0559   WBC 3.43*   HGB 7.3*   HCT 24.5*   PLT 94*       CMP:   Recent Labs   Lab 07/10/23  0559   *   K 5.2*      CO2 26   GLU 93   BUN 18   CREATININE 0.6   CALCIUM 7.4*   PROT 6.6   ALBUMIN 1.4*   BILITOT 2.4*   ALKPHOS 149*   AST 33   ALT 10   ANIONGAP 1*       Cardiac Markers:   No results for input(s): CKMB, MYOGLOBIN, BNP, TROPISTAT in the last 48 hours.    Coagulation:   No results for input(s): PT, INR, APTT in the last 48 hours.    Lactic Acid:   No results for input(s): LACTATE in the last 48 hours.    Lipase: No results for input(s): LIPASE in the last 48 hours.  Lipid Panel: No results for input(s): CHOL, HDL, LDLCALC, TRIG, CHOLHDL in the last 48 hours.  Magnesium:   No results for input(s): MG in the last 48 hours.    POCT Glucose: No results for input(s): POCTGLUCOSE in the last 48 hours.  Prealbumin: No results for input(s): PREALBUMIN in the last 48 hours.  Respiratory Culture: No results for input(s): GSRESP, RESPIRATORYC in the last 48 hours.  Troponin:   No results for input(s): TROPONINI, TROPONINIHS in the last 48 hours.    TSH: No results for input(s): TSH in the last 4320 hours.  Urine Culture: No results for input(s): LABURIN in the last 48 hours.  Urine Studies:   No results for input(s): COLORU, APPEARANCEUA, PHUR, SPECGRAV, PROTEINUA, GLUCUA, KETONESU, BILIRUBINUA, OCCULTUA, NITRITE, UROBILINOGEN, LEUKOCYTESUR, RBCUA, WBCUA, BACTERIA, SQUAMEPITHEL, HYALINECASTS in the last 48 hours.    Invalid input(s): TREY      Significant Imaging: I have reviewed all pertinent imaging results/findings within the past 24 hours.      Assessment/Plan:      Hypoalbuminemia  Lab Results   Component Value Date    ALBUMIN 1.4 (L) 07/10/2023       Dietician consult--Will add Srinivasa BID per recs     Wounds, multiple  Bilateral heels with escharSacral region    General surgery consulted---santyl ordered. Will likely need to be debrided at some point       Right arm pain  Right arm pain  and decreased ROM  XRAY cspine with DJD C3/4/5. This could cause some radicular pain radiating into the shoulder... she is on gabapentin  Her xray of shoulder shows that she has a high riding humeral head, concerning for a supraspinatus tear...she can address this as an outpt with an MRI    Continue home gabapentin. Order PT for her as outpt depending on her disposition.... trying for LTAC       Hypotension  Component of hypoalbuminemia and suspicion for underlying infection  U/A abnormal but unclear if this is source  Blood cultures pending. If negative at 48-72 hours and vitals remain stable I would feel comfortable with discharge   MAP stable in 70s  7/9---Will give her a 500ml bolus as her systolic dipped to 88 this am    1 unit of pRBC, decrease gabapentin       Weakness of both lower extremities  After discitis   Unable to ambulate       Debility  Chronic debility  Patient unable to care for herself  CM working placement to LTACH  PT is seeing her       Substance use disorder  + methamphetamine use on UDS  Cessation resources from CM at discharge   Suboxone prescribed     Cirrhosis of liver with ascites  Monitor AST and ALT  Ok to give her tylenol for pain, as long as she is getting less than 2g/day total         Abnormal urinalysis  + nitrites on U/A; WB on microscopic mildly elevated and only trace amount of leukocytes  Will tx due to symptomatic  H/o ESBL klebsiella Pneumoniae  D/c IV meropenem.  Low suspicion for UTI.  U/A did not reflex.          VTE Risk Mitigation (From admission, onward)         Ordered     IP VTE HIGH RISK PATIENT  Once         07/07/23 0107     Place sequential compression device  Until discontinued         07/07/23 0107                Discharge Planning   SHIRA:      Code Status: Full Code   Is the patient medically ready for discharge?:     Reason for patient still in hospital (select all that apply): Patient trending condition  Discharge Plan A: Long-term acute care facility (LTAC)    Discharge Delays: None known at this time              Leyla Blair PA-C  Department of Hospital Medicine   [facility] Surg (3rd Fl)

## 2023-07-10 NOTE — SUBJECTIVE & OBJECTIVE
Past Medical History:   Diagnosis Date    Anemia     Anxiety     Depressed     Diskitis     Hep C w/o coma, chronic     IV drug abuse     Kidney stone     Liver cirrhosis        Past Surgical History:   Procedure Laterality Date    ARTHROTOMY OF SHOULDER Left 11/15/2022    Procedure: ARTHROTOMY, SHOULDER;  Surgeon: Bjorn Hayes MD;  Location: [facility];  Service: Orthopedics;  Laterality: Left;  Left acromioclavicular joint arthrotomy    BACK SURGERY      benine tumor removal      forehead, age 9    CHOLECYSTECTOMY      KIDNEY SURGERY      LUMBAR FUSION Bilateral 3/2/2022    Procedure: FUSION, SPINE, LUMBAR;  Surgeon: Guille Templeton MD;  Location: [address];  Service: Neurosurgery;  Laterality: Bilateral;  AIRO  T10--Pelvis    LUMBAR FUSION N/A 1/24/2023    Procedure: **AIRO** T8-T12 POSTERIOR FUSION, T8-T10 LAMINECTOMY, HARDWARE REMOVAL AND WASHOUT;  Surgeon: Guille Templeton MD;  Location: [address];  Service: Neurosurgery;  Laterality: N/A;    REMOVAL OF HARDWARE FROM SPINE N/A 2/21/2022    Procedure: REMOVAL, HARDWARE, SPINE;  Surgeon: Guille Templeton MD;  Location: [address];  Service: Neurosurgery;  Laterality: N/A;  [place]    SPINE SURGERY      THORACIC LAMINECTOMY WITH FUSION N/A 2/2/2023    Procedure: LAMINECTOMY, SPINE, THORACIC, WITH FUSION (T4-Pelvis fusion w/ T9-10 corpectomies) **AIRO;  Surgeon: Guille Templeton MD;  Location: [address];  Service: Neurosurgery;  Laterality: N/A;  AIRO, 2 bovies, aquamantys, Globus, Depuy, antibiotic beads, TXA, Peroxide; Babycos plastics closure       Review of patient's allergies indicates:   Allergen Reactions    Bee venom protein (honey bee) Anaphylaxis     Patient reports she is allergic to bee stings.    Naproxen Anaphylaxis     Throat closing    12-23- Patient reports taking Ibuprofen 200 mg at home without problems. Verified X3. [place]    Wasp sting [allergen ext-venom-honey bee] Anaphylaxis    Adhesive Blisters    Shellfish containing products      Iodine and iodide containing products Hives and Itching     Allergic to iodine in seafood only    Nuts [tree nut] Rash       No current facility-administered medications on file prior to encounter.     Current Outpatient Medications on File Prior to Encounter   Medication Sig    bumetanide (BUMEX) 1 MG tablet Take 1 tablet (1 mg total) by mouth once daily.    buprenorphine-naloxone 8-2 mg (SUBOXONE) 8-2 mg Place under the tongue 2 (two) times a day.    DULoxetine (CYMBALTA) 60 MG capsule Take 60 mg by mouth once daily.    hydrOXYzine HCL (ATARAX) 25 MG tablet Take 1 tablet (25 mg total) by mouth 3 (three) times daily as needed for Anxiety.    hydrOXYzine pamoate (VISTARIL) 25 MG Cap Take 25 mg by mouth 2 (two) times daily as needed.    lactulose (CHRONULAC) 20 gram/30 mL Soln Take 30 mLs (20 g total) by mouth 3 (three) times daily.    melatonin (MELATIN) 3 mg tablet Take 2 tablets (6 mg total) by mouth nightly as needed for Insomnia.    pantoprazole (PROTONIX) 40 MG tablet Take 1 tablet (40 mg total) by mouth once daily.    spironolactone (ALDACTONE) 100 MG tablet Take 1 tablet (100 mg total) by mouth once daily.    tiZANidine (ZANAFLEX) 4 MG tablet Take 1 tablet (4 mg total) by mouth 3 (three) times daily as needed (Pain/muscle spasms).    acetaminophen (TYLENOL) 500 MG tablet Take 2 tablets (1,000 mg total) by mouth every 12 (twelve) hours. (Patient not taking: Reported on 6/22/2023)    calcium carbonate (TUMS) 200 mg calcium (500 mg) chewable tablet Take 2 tablets (1,000 mg total) by mouth 3 (three) times daily as needed.    folic acid (FOLVITE) 1 MG tablet Take 1 tablet (1 mg total) by mouth once daily. (Patient taking differently: Take 1 mg by mouth daily as needed (vitamin supplement).)    gabapentin (NEURONTIN) 300 MG capsule Take 3 capsules (900 mg total) by mouth 3 (three) times daily.    ondansetron (ZOFRAN) 4 MG tablet Take 8 mg by mouth 2 (two) times daily as needed for Nausea.    oxyCODONE  (ROXICODONE) 5 MG immediate release tablet Take 5 mg by mouth.    polyethylene glycol (GLYCOLAX) 17 gram PwPk Take 17 g by mouth daily as needed. (Patient not taking: Reported on 6/22/2023)    [DISCONTINUED] diclofenac sodium (VOLTAREN) 1 % Gel Apply 2 g topically 4 (four) times daily.     Family History       Problem Relation (Age of Onset)    Cirrhosis Father    Drug abuse Mother, Father    Hepatitis Mother, Father    Liver cancer Mother          Tobacco Use    Smoking status: Some Days     Packs/day: 0.50     Years: 23.00     Pack years: 11.50     Types: Cigarettes    Smokeless tobacco: Never    Tobacco comments:     patient states she knows she nees to quit.   Substance and Sexual Activity    Alcohol use: Not Currently     Comment: quit 2014    Drug use: Not Currently     Frequency: 4.0 times per week     Types: Marijuana     Comment: Patient denies needle use, only inhalation of methampehtamines or orally.  Last known drug use was prior to admission to hospital stay for surgery    Sexual activity: Yes     Partners: Male     Birth control/protection: None     Review of Systems   Constitutional:  Positive for fatigue. Negative for chills and fever.   HENT:  Negative for ear discharge and ear pain.    Eyes:  Negative for pain and discharge.   Respiratory:  Negative for cough and shortness of breath.    Cardiovascular:  Negative for chest pain (mostly associated when other areas in her body are in pain) and leg swelling.   Gastrointestinal:  Negative for nausea and vomiting.   Endocrine: Negative for cold intolerance and heat intolerance.   Genitourinary:  Negative for difficulty urinating and dysuria.   Musculoskeletal:  Positive for arthralgias and gait problem. Negative for myalgias.        Right arm pain    Skin:  Positive for wound. Negative for rash.   Neurological:  Negative for dizziness and headaches.   Psychiatric/Behavioral:  Negative for agitation and confusion.    Objective:     Vital Signs (Most  Recent):  Temp: 98.1 °F (36.7 °C) (07/10/23 1127)  Pulse: 79 (07/10/23 1200)  Resp: 16 (07/10/23 1127)  BP: (!) 90/52 (07/10/23 1127)  SpO2: 99 % (07/10/23 1127) Vital Signs (24h Range):  Temp:  [97.1 °F (36.2 °C)-98.6 °F (37 °C)] 98.1 °F (36.7 °C)  Pulse:  [68-85] 79  Resp:  [16-20] 16  SpO2:  [97 %-100 %] 99 %  BP: ()/(50-57) 90/52     Weight: 118.9 kg (262 lb 2 oz)  Body mass index is 41.05 kg/m².     Physical Exam  Constitutional:       Appearance: Normal appearance.      Comments: Continued generalized pain     HENT:      Head: Normocephalic and atraumatic.   Cardiovascular:      Rate and Rhythm: Normal rate and regular rhythm.   Pulmonary:      Effort: Pulmonary effort is normal. No respiratory distress.   Abdominal:      General: Bowel sounds are normal. There is no distension.      Palpations: Abdomen is soft.      Tenderness: There is no abdominal tenderness.   Musculoskeletal:         General: Tenderness present.      Right lower leg: No edema.      Left lower leg: No edema.      Comments: RUE tenderness and decreased ROM  She will not cooperate to do a good shoulder exam due to pain   ROM improving today    Skin:     General: Skin is warm and dry.      Comments: Multiple wounds on  bilateral heels and sacral region    Neurological:      Mental Status: She is alert and oriented to person, place, and time. Mental status is at baseline.   Psychiatric:         Mood and Affect: Mood normal.         Behavior: Behavior normal.              Significant Labs: A1C: No results for input(s): HGBA1C in the last 4320 hours.  ABGs: No results for input(s): PH, PCO2, HCO3, POCSATURATED, BE, TOTALHB, COHB, METHB, O2HB, POCFIO2, PO2 in the last 48 hours.  Bilirubin:   Recent Labs   Lab 06/16/23  0519 07/06/23  2249 07/07/23  1133 07/08/23  0604 07/10/23  0559   BILITOT 2.4* 3.1* 3.2* 3.0* 2.4*       Blood Culture:   No results for input(s): JONELLE in the last 48 hours.    BMP:   Recent Labs   Lab 07/10/23  0559    GLU 93   *   K 5.2*      CO2 26   BUN 18   CREATININE 0.6   CALCIUM 7.4*       CBC:   Recent Labs   Lab 07/10/23  0559   WBC 3.43*   HGB 7.3*   HCT 24.5*   PLT 94*       CMP:   Recent Labs   Lab 07/10/23  0559   *   K 5.2*      CO2 26   GLU 93   BUN 18   CREATININE 0.6   CALCIUM 7.4*   PROT 6.6   ALBUMIN 1.4*   BILITOT 2.4*   ALKPHOS 149*   AST 33   ALT 10   ANIONGAP 1*       Cardiac Markers:   No results for input(s): CKMB, MYOGLOBIN, BNP, TROPISTAT in the last 48 hours.    Coagulation:   No results for input(s): PT, INR, APTT in the last 48 hours.    Lactic Acid:   No results for input(s): LACTATE in the last 48 hours.    Lipase: No results for input(s): LIPASE in the last 48 hours.  Lipid Panel: No results for input(s): CHOL, HDL, LDLCALC, TRIG, CHOLHDL in the last 48 hours.  Magnesium:   No results for input(s): MG in the last 48 hours.    POCT Glucose: No results for input(s): POCTGLUCOSE in the last 48 hours.  Prealbumin: No results for input(s): PREALBUMIN in the last 48 hours.  Respiratory Culture: No results for input(s): GSRESP, RESPIRATORYC in the last 48 hours.  Troponin:   No results for input(s): TROPONINI, TROPONINIHS in the last 48 hours.    TSH: No results for input(s): TSH in the last 4320 hours.  Urine Culture: No results for input(s): LABURIN in the last 48 hours.  Urine Studies:   No results for input(s): COLORU, APPEARANCEUA, PHUR, SPECGRAV, PROTEINUA, GLUCUA, KETONESU, BILIRUBINUA, OCCULTUA, NITRITE, UROBILINOGEN, LEUKOCYTESUR, RBCUA, WBCUA, BACTERIA, SQUAMEPITHEL, HYALINECASTS in the last 48 hours.    Invalid input(s): WRIGHTSUR      Significant Imaging: I have reviewed all pertinent imaging results/findings within the past 24 hours.

## 2023-07-10 NOTE — CONSULTS
Nurse and I went into room to see patients wound and she agreed.    Recommendations:    Mid back wound- clean with n/s and apply foam dressing and change every 5 days & PRN drainage    Back of right calf -clean with n/s and apply foam dressing and change every 5 days & PRN drainage    Back of  right posterior thigh- clean with n/s and apply foam dressing and change every 5 days & PRN drainage    Right heel- clean with n/s apply santyl nickel thick, ABD ans wrap with kerlix daily    Left heel- clean with n/s apply santyl nickel thick, ABD ans wrap with kerlix daily      Turn patient every 2 hours and PRN soiling    Float heels while in bed    Patient can follow up with out patient wound care, when discharged from the hospital

## 2023-07-11 LAB
ACTH PLAS-MCNC: 10 PG/ML (ref 0–46)
ANION GAP SERPL CALC-SCNC: 4 MMOL/L (ref 8–16)
ASCENDING AORTA: 2.5 CM
AV INDEX (PROSTH): 0.84
AV MEAN GRADIENT: 5 MMHG
AV PEAK GRADIENT: 8 MMHG
AV VALVE AREA: 3.1 CM2
AV VELOCITY RATIO: 0.89
BASOPHILS # BLD AUTO: 0.02 K/UL (ref 0–0.2)
BASOPHILS NFR BLD: 0.7 % (ref 0–1.9)
BSA FOR ECHO PROCEDURE: 2.37 M2
BUN SERPL-MCNC: 16 MG/DL (ref 6–20)
CALCIUM SERPL-MCNC: 7.5 MG/DL (ref 8.7–10.5)
CHLORIDE SERPL-SCNC: 103 MMOL/L (ref 95–110)
CO2 SERPL-SCNC: 24 MMOL/L (ref 23–29)
CORTIS SERPL-MCNC: 3.3 UG/DL
CREAT SERPL-MCNC: 0.5 MG/DL (ref 0.5–1.4)
CV ECHO LV RWT: 0.36 CM
DIFFERENTIAL METHOD: ABNORMAL
DOP CALC AO PEAK VEL: 1.42 M/S
DOP CALC AO VTI: 33.1 CM
DOP CALC LVOT AREA: 3.7 CM2
DOP CALC LVOT DIAMETER: 2.17 CM
DOP CALC LVOT PEAK VEL: 1.27 M/S
DOP CALC LVOT STROKE VOLUME: 102.76 CM3
DOP CALC RVOT PEAK VEL: 0.75 M/S
DOP CALC RVOT VTI: 18.8 CM
DOP CALCLVOT PEAK VEL VTI: 27.8 CM
E WAVE DECELERATION TIME: 202.93 MSEC
E/A RATIO: 1.07
E/E' RATIO: 8 M/S
ECHO LV POSTERIOR WALL: 1 CM (ref 0.6–1.1)
EJECTION FRACTION: 55 %
EOSINOPHIL # BLD AUTO: 0.1 K/UL (ref 0–0.5)
EOSINOPHIL NFR BLD: 2.2 % (ref 0–8)
ERYTHROCYTE [DISTWIDTH] IN BLOOD BY AUTOMATED COUNT: 20.4 % (ref 11.5–14.5)
EST. GFR  (NO RACE VARIABLE): >60 ML/MIN/1.73 M^2
FRACTIONAL SHORTENING: 34 % (ref 28–44)
GLUCOSE SERPL-MCNC: 103 MG/DL (ref 70–110)
HCT VFR BLD AUTO: 26.8 % (ref 37–48.5)
HGB BLD-MCNC: 8.1 G/DL (ref 12–16)
IMM GRANULOCYTES # BLD AUTO: 0.06 K/UL (ref 0–0.04)
IMM GRANULOCYTES NFR BLD AUTO: 2.2 % (ref 0–0.5)
INTERVENTRICULAR SEPTUM: 1.04 CM (ref 0.6–1.1)
IVC DIAMETER: 2.51 CM
IVRT: 117.98 MSEC
LA MAJOR: 5.53 CM
LA MINOR: 5.08 CM
LA WIDTH: 5.5 CM
LACTATE SERPL-SCNC: 0.9 MMOL/L (ref 0.5–2.2)
LEFT ATRIUM SIZE: 4.08 CM
LEFT ATRIUM VOLUME INDEX MOD: 42.8 ML/M2
LEFT ATRIUM VOLUME INDEX: 44.5 ML/M2
LEFT ATRIUM VOLUME MOD: 97.22 CM3
LEFT ATRIUM VOLUME: 101.01 CM3
LEFT INTERNAL DIMENSION IN SYSTOLE: 3.61 CM (ref 2.1–4)
LEFT VENTRICLE DIASTOLIC VOLUME INDEX: 64.75 ML/M2
LEFT VENTRICLE DIASTOLIC VOLUME: 146.99 ML
LEFT VENTRICLE MASS INDEX: 96 G/M2
LEFT VENTRICLE SYSTOLIC VOLUME INDEX: 24.1 ML/M2
LEFT VENTRICLE SYSTOLIC VOLUME: 54.81 ML
LEFT VENTRICULAR INTERNAL DIMENSION IN DIASTOLE: 5.49 CM (ref 3.5–6)
LEFT VENTRICULAR MASS: 218.16 G
LV LATERAL E/E' RATIO: 7.71 M/S
LV SEPTAL E/E' RATIO: 8.31 M/S
LVOT MG: 3.92 MMHG
LVOT MV: 0.95 CM/S
LYMPHOCYTES # BLD AUTO: 0.4 K/UL (ref 1–4.8)
LYMPHOCYTES NFR BLD: 16 % (ref 18–48)
MCH RBC QN AUTO: 30.6 PG (ref 27–31)
MCHC RBC AUTO-ENTMCNC: 30.2 G/DL (ref 32–36)
MCV RBC AUTO: 101 FL (ref 82–98)
MONOCYTES # BLD AUTO: 0.3 K/UL (ref 0.3–1)
MONOCYTES NFR BLD: 10.9 % (ref 4–15)
MV PEAK A VEL: 1.01 M/S
MV PEAK E VEL: 1.08 M/S
MV STENOSIS PRESSURE HALF TIME: 58.85 MS
MV VALVE AREA P 1/2 METHOD: 3.74 CM2
NEUTROPHILS # BLD AUTO: 1.9 K/UL (ref 1.8–7.7)
NEUTROPHILS NFR BLD: 68 % (ref 38–73)
NRBC BLD-RTO: 1 /100 WBC
PISA TR MAX VEL: 2.57 M/S
PLATELET # BLD AUTO: 86 K/UL (ref 150–450)
PMV BLD AUTO: 9.3 FL (ref 9.2–12.9)
POTASSIUM SERPL-SCNC: 4.9 MMOL/L (ref 3.5–5.1)
PROCALCITONIN SERPL IA-MCNC: 0.05 NG/ML
PULM VEIN S/D RATIO: 1.3
PV MEAN GRADIENT: 1.6 MMHG
PV MV: 0.84 M/S
PV PEAK D VEL: 0.46 M/S
PV PEAK S VEL: 0.6 M/S
PV PEAK VELOCITY: 1.09 CM/S
RA MAJOR: 5.36 CM
RA PRESSURE: 8 MMHG
RBC # BLD AUTO: 2.65 M/UL (ref 4–5.4)
RIGHT VENTRICULAR END-DIASTOLIC DIMENSION: 3.22 CM
SINUS: 2.68 CM
SODIUM SERPL-SCNC: 131 MMOL/L (ref 136–145)
STJ: 2.62 CM
TDI LATERAL: 0.14 M/S
TDI SEPTAL: 0.13 M/S
TDI: 0.14 M/S
TR MAX PG: 26 MMHG
TROPONIN I SERPL DL<=0.01 NG/ML-MCNC: 0.01 NG/ML (ref 0–0.03)
TV REST PULMONARY ARTERY PRESSURE: 34 MMHG
WBC # BLD AUTO: 2.75 K/UL (ref 3.9–12.7)

## 2023-07-11 PROCEDURE — 36415 COLL VENOUS BLD VENIPUNCTURE: CPT | Performed by: PHYSICIAN ASSISTANT

## 2023-07-11 PROCEDURE — 11000001 HC ACUTE MED/SURG PRIVATE ROOM

## 2023-07-11 PROCEDURE — 83605 ASSAY OF LACTIC ACID: CPT | Performed by: PHYSICIAN ASSISTANT

## 2023-07-11 PROCEDURE — 27000207 HC ISOLATION

## 2023-07-11 PROCEDURE — 25000003 PHARM REV CODE 250: Performed by: PHYSICIAN ASSISTANT

## 2023-07-11 PROCEDURE — 25000003 PHARM REV CODE 250: Performed by: SURGERY

## 2023-07-11 PROCEDURE — 99232 SBSQ HOSP IP/OBS MODERATE 35: CPT | Mod: 95,,, | Performed by: PHYSICIAN ASSISTANT

## 2023-07-11 PROCEDURE — 80048 BASIC METABOLIC PNL TOTAL CA: CPT | Performed by: PHYSICIAN ASSISTANT

## 2023-07-11 PROCEDURE — 84484 ASSAY OF TROPONIN QUANT: CPT | Performed by: PHYSICIAN ASSISTANT

## 2023-07-11 PROCEDURE — 85025 COMPLETE CBC W/AUTO DIFF WBC: CPT | Performed by: PHYSICIAN ASSISTANT

## 2023-07-11 PROCEDURE — 25000003 PHARM REV CODE 250: Performed by: FAMILY MEDICINE

## 2023-07-11 PROCEDURE — 93010 EKG 12-LEAD: ICD-10-PCS | Mod: ,,, | Performed by: INTERNAL MEDICINE

## 2023-07-11 PROCEDURE — 93005 ELECTROCARDIOGRAM TRACING: CPT

## 2023-07-11 PROCEDURE — 94761 N-INVAS EAR/PLS OXIMETRY MLT: CPT

## 2023-07-11 PROCEDURE — 97530 THERAPEUTIC ACTIVITIES: CPT

## 2023-07-11 PROCEDURE — 99232 PR SUBSEQUENT HOSPITAL CARE,LEVL II: ICD-10-PCS | Mod: 95,,, | Performed by: PHYSICIAN ASSISTANT

## 2023-07-11 PROCEDURE — 84145 PROCALCITONIN (PCT): CPT | Performed by: PHYSICIAN ASSISTANT

## 2023-07-11 PROCEDURE — 63600175 PHARM REV CODE 636 W HCPCS: Performed by: SURGERY

## 2023-07-11 PROCEDURE — 99900035 HC TECH TIME PER 15 MIN (STAT)

## 2023-07-11 PROCEDURE — 93010 ELECTROCARDIOGRAM REPORT: CPT | Mod: ,,, | Performed by: INTERNAL MEDICINE

## 2023-07-11 RX ORDER — SODIUM CHLORIDE 9 MG/ML
INJECTION, SOLUTION INTRAVENOUS CONTINUOUS
Status: DISCONTINUED | OUTPATIENT
Start: 2023-07-11 | End: 2023-07-14

## 2023-07-11 RX ORDER — NOREPINEPHRINE BITARTRATE/D5W 4MG/250ML
0-3 PLASTIC BAG, INJECTION (ML) INTRAVENOUS CONTINUOUS
Status: DISCONTINUED | OUTPATIENT
Start: 2023-07-11 | End: 2023-07-13 | Stop reason: HOSPADM

## 2023-07-11 RX ORDER — GABAPENTIN 100 MG/1
100 CAPSULE ORAL 3 TIMES DAILY
Status: DISCONTINUED | OUTPATIENT
Start: 2023-07-11 | End: 2023-07-14

## 2023-07-11 RX ADMIN — GABAPENTIN 300 MG: 300 CAPSULE ORAL at 10:07

## 2023-07-11 RX ADMIN — SODIUM CHLORIDE: 9 INJECTION, SOLUTION INTRAVENOUS at 04:07

## 2023-07-11 RX ADMIN — ACETAMINOPHEN 500 MG: 500 TABLET ORAL at 03:07

## 2023-07-11 RX ADMIN — DULOXETINE 60 MG: 30 CAPSULE, DELAYED RELEASE ORAL at 10:07

## 2023-07-11 RX ADMIN — GABAPENTIN 100 MG: 100 CAPSULE ORAL at 09:07

## 2023-07-11 RX ADMIN — SODIUM CHLORIDE 250 ML: 9 INJECTION, SOLUTION INTRAVENOUS at 01:07

## 2023-07-11 RX ADMIN — ACETAMINOPHEN 500 MG: 500 TABLET ORAL at 10:07

## 2023-07-11 RX ADMIN — ACETAMINOPHEN 500 MG: 500 TABLET ORAL at 07:07

## 2023-07-11 RX ADMIN — SODIUM CHLORIDE 250 ML: 9 INJECTION, SOLUTION INTRAVENOUS at 12:07

## 2023-07-11 RX ADMIN — TIZANIDINE 4 MG: 2 TABLET ORAL at 03:07

## 2023-07-11 RX ADMIN — PANTOPRAZOLE SODIUM 40 MG: 40 TABLET, DELAYED RELEASE ORAL at 10:07

## 2023-07-11 RX ADMIN — LACTULOSE 30 G: 20 SOLUTION ORAL at 10:07

## 2023-07-11 RX ADMIN — ONDANSETRON HYDROCHLORIDE 4 MG: 2 SOLUTION INTRAMUSCULAR; INTRAVENOUS at 03:07

## 2023-07-11 RX ADMIN — SODIUM CHLORIDE 1000 ML: 9 INJECTION, SOLUTION INTRAVENOUS at 03:07

## 2023-07-11 RX ADMIN — COLLAGENASE SANTYL: 250 OINTMENT TOPICAL at 09:07

## 2023-07-11 NOTE — SUBJECTIVE & OBJECTIVE
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9    CHOLECYSTECTOMY      KIDNEY SURGERY      LUMBAR FUSION Bilateral 3/2/2022    Procedure: FUSION, SPINE, LUMBAR;  Surgeon: Guille Templeton MD;  Location: [address];  Service: Neurosurgery;  Laterality: Bilateral;  AIRO  T10--Pelvis    LUMBAR FUSION N/A 1/24/2023    Procedure: **AIRO** T8-T12 POSTERIOR FUSION, T8-T10 LAMINECTOMY, HARDWARE REMOVAL AND WASHOUT;  Surgeon: Guille Templeton MD;  Location: [address];  Service: Neurosurgery;  Laterality: N/A;    REMOVAL OF HARDWARE FROM SPINE N/A 2/21/2022    Procedure: REMOVAL, HARDWARE, SPINE;  Surgeon: Guille Templeton MD;  Location: [address];  Service: Neurosurgery;  Laterality: N/A;  [place]    SPINE SURGERY      THORACIC LAMINECTOMY WITH FUSION N/A 2/2/2023    Procedure: LAMINECTOMY, SPINE, THORACIC, WITH FUSION (T4-Pelvis fusion w/ T9-10 corpectomies) **AIRO;  Surgeon: Guille Templeton MD;  Location: [address];  Service: Neurosurgery;  Laterality: N/A;  AIRO, 2 bovies, aquamantys, Globus, Depuy, antibiotic beads, TXA, Peroxide; [employer] plastics closure       Review of patient's allergies indicates:   Allergen Reactions    Bee venom protein (honey bee) Anaphylaxis     Patient reports she is allergic to bee stings.    Naproxen Anaphylaxis     Throat closing    12-23- Patient reports taking Ibuprofen 200 mg at home without problems. Verified X3. [place]    Wasp sting [allergen ext-venom-honey bee] Anaphylaxis    Adhesive Blisters     Shellfish containing products     Iodine and iodide containing products Hives and Itching     Allergic to iodine in seafood only    Nuts [tree nut] Rash       No current facility-administered medications on file prior to encounter.     Current Outpatient Medications on File Prior to Encounter   Medication Sig    bumetanide (BUMEX) 1 MG tablet Take 1 tablet (1 mg total) by mouth once daily.    buprenorphine-naloxone 8-2 mg (SUBOXONE) 8-2 mg Place under the tongue 2 (two) times a day.    DULoxetine (CYMBALTA) 60 MG capsule Take 60 mg by mouth once daily.    hydrOXYzine HCL (ATARAX) 25 MG tablet Take 1 tablet (25 mg total) by mouth 3 (three) times daily as needed for Anxiety.    hydrOXYzine pamoate (VISTARIL) 25 MG Cap Take 25 mg by mouth 2 (two) times daily as needed.    lactulose (CHRONULAC) 20 gram/30 mL Soln Take 30 mLs (20 g total) by mouth 3 (three) times daily.    melatonin (MELATIN) 3 mg tablet Take 2 tablets (6 mg total) by mouth nightly as needed for Insomnia.    pantoprazole (PROTONIX) 40 MG tablet Take 1 tablet (40 mg total) by mouth once daily.    spironolactone (ALDACTONE) 100 MG tablet Take 1 tablet (100 mg total) by mouth once daily.    tiZANidine (ZANAFLEX) 4 MG tablet Take 1 tablet (4 mg total) by mouth 3 (three) times daily as needed (Pain/muscle spasms).    acetaminophen (TYLENOL) 500 MG tablet Take 2 tablets (1,000 mg total) by mouth every 12 (twelve) hours. (Patient not taking: Reported on 6/22/2023)    calcium carbonate (TUMS) 200 mg calcium (500 mg) chewable tablet Take 2 tablets (1,000 mg total) by mouth 3 (three) times daily as needed.    folic acid (FOLVITE) 1 MG tablet Take 1 tablet (1 mg total) by mouth once daily. (Patient taking differently: Take 1 mg by mouth daily as needed (vitamin supplement).)    gabapentin (NEURONTIN) 300 MG capsule Take 3 capsules (900 mg total) by mouth 3 (three) times daily.    ondansetron (ZOFRAN) 4 MG tablet Take 8 mg by mouth 2 (two) times  daily as needed for Nausea.    oxyCODONE (ROXICODONE) 5 MG immediate release tablet Take 5 mg by mouth.    polyethylene glycol (GLYCOLAX) 17 gram PwPk Take 17 g by mouth daily as needed. (Patient not taking: Reported on 6/22/2023)    [DISCONTINUED] diclofenac sodium (VOLTAREN) 1 % Gel Apply 2 g topically 4 (four) times daily.     Family History       Problem Relation (Age of Onset)    Cirrhosis Father    Drug abuse Mother, Father    Hepatitis Mother, Father    Liver cancer Mother          Tobacco Use    Smoking status: Some Days     Packs/day: 0.50     Years: 23.00     Pack years: 11.50     Types: Cigarettes    Smokeless tobacco: Never    Tobacco comments:     patient states she knows she nees to quit.   Substance and Sexual Activity    Alcohol use: Not Currently     Comment: quit 2014    Drug use: Not Currently     Frequency: 4.0 times per week     Types: Marijuana     Comment: Patient denies needle use, only inhalation of methampehtamines or orally.  Last known drug use was prior to admission to hospital stay for surgery    Sexual activity: Yes     Partners: Male     Birth control/protection: None     Review of Systems   Constitutional:  Positive for fatigue. Negative for chills and fever.   HENT:  Negative for ear discharge and ear pain.    Eyes:  Negative for pain and discharge.   Respiratory:  Negative for cough and shortness of breath.    Cardiovascular:  Negative for chest pain (mostly associated when other areas in her body are in pain) and leg swelling.   Gastrointestinal:  Negative for nausea and vomiting.   Endocrine: Negative for cold intolerance and heat intolerance.   Genitourinary:  Negative for difficulty urinating and dysuria.   Musculoskeletal:  Positive for arthralgias and gait problem. Negative for myalgias.        Right arm pain    Skin:  Positive for wound. Negative for rash.   Neurological:  Negative for dizziness and headaches.   Psychiatric/Behavioral:  Negative for agitation and  confusion.    Objective:     Vital Signs (Most Recent):  Temp: 97.6 °F (36.4 °C) (07/11/23 1142)  Pulse: 68 (07/11/23 1404)  Resp: 17 (07/11/23 1142)  BP: (!) 80/44 (07/11/23 1318)  SpO2: 95 % (07/11/23 1142) Vital Signs (24h Range):  Temp:  [97.6 °F (36.4 °C)-99.2 °F (37.3 °C)] 97.6 °F (36.4 °C)  Pulse:  [67-93] 68  Resp:  [10-18] 17  SpO2:  [95 %-100 %] 95 %  BP: ()/(44-62) 80/44     Weight: 118.9 kg (262 lb 2 oz)  Body mass index is 41.05 kg/m².     Physical Exam  Constitutional:       Appearance: Normal appearance.      Comments: Continued generalized pain     HENT:      Head: Normocephalic and atraumatic.   Cardiovascular:      Rate and Rhythm: Normal rate and regular rhythm.   Pulmonary:      Effort: Pulmonary effort is normal. No respiratory distress.   Abdominal:      General: Bowel sounds are normal. There is no distension.      Palpations: Abdomen is soft.      Tenderness: There is no abdominal tenderness.   Musculoskeletal:         General: Tenderness present.      Right lower leg: No edema.      Left lower leg: No edema.      Comments: RUE tenderness and decreased ROM  She will not cooperate to do a good shoulder exam due to pain   ROM improving today    Skin:     General: Skin is warm and dry.      Comments: Multiple wounds on  bilateral heels and sacral region    Neurological:      Mental Status: She is alert and oriented to person, place, and time. Mental status is at baseline.   Psychiatric:         Mood and Affect: Mood normal.         Behavior: Behavior normal.              Significant Labs: A1C: No results for input(s): HGBA1C in the last 4320 hours.  ABGs: No results for input(s): PH, PCO2, HCO3, POCSATURATED, BE, TOTALHB, COHB, METHB, O2HB, POCFIO2, PO2 in the last 48 hours.  Bilirubin:   Recent Labs   Lab 06/16/23  0519 07/06/23  2249 07/07/23  1133 07/08/23  0604 07/10/23  0559   BILITOT 2.4* 3.1* 3.2* 3.0* 2.4*       Blood Culture:   No results for input(s): LABBLOO in the last 48  hours.    BMP:   Recent Labs   Lab 07/11/23  0609      *   K 4.9      CO2 24   BUN 16   CREATININE 0.5   CALCIUM 7.5*       CBC:   Recent Labs   Lab 07/10/23  0559 07/11/23  0609   WBC 3.43* 2.75*   HGB 7.3* 8.1*   HCT 24.5* 26.8*   PLT 94* 86*       CMP:   Recent Labs   Lab 07/10/23  0559 07/11/23  0609   * 131*   K 5.2* 4.9    103   CO2 26 24   GLU 93 103   BUN 18 16   CREATININE 0.6 0.5   CALCIUM 7.4* 7.5*   PROT 6.6  --    ALBUMIN 1.4*  --    BILITOT 2.4*  --    ALKPHOS 149*  --    AST 33  --    ALT 10  --    ANIONGAP 1* 4*       Cardiac Markers:   No results for input(s): CKMB, MYOGLOBIN, BNP, TROPISTAT in the last 48 hours.    Coagulation:   No results for input(s): PT, INR, APTT in the last 48 hours.    Lactic Acid:   No results for input(s): LACTATE in the last 48 hours.    Lipase: No results for input(s): LIPASE in the last 48 hours.  Lipid Panel: No results for input(s): CHOL, HDL, LDLCALC, TRIG, CHOLHDL in the last 48 hours.  Magnesium:   No results for input(s): MG in the last 48 hours.    POCT Glucose: No results for input(s): POCTGLUCOSE in the last 48 hours.  Prealbumin: No results for input(s): PREALBUMIN in the last 48 hours.  Respiratory Culture: No results for input(s): GSRESP, RESPIRATORYC in the last 48 hours.  Troponin:   No results for input(s): TROPONINI, TROPONINIHS in the last 48 hours.    TSH: No results for input(s): TSH in the last 4320 hours.  Urine Culture: No results for input(s): LABURIN in the last 48 hours.  Urine Studies:   Recent Labs   Lab 07/10/23  1840   COLORU Yellow   APPEARANCEUA Clear   PHUR 7.0   SPECGRAV 1.010   PROTEINUA Negative   GLUCUA Negative   KETONESU Negative   BILIRUBINUA Negative   OCCULTUA Trace*   NITRITE Negative   UROBILINOGEN 2.0-3.0*   LEUKOCYTESUR Trace*   RBCUA 1   WBCUA 1   BACTERIA Rare         Significant Imaging: I have reviewed all pertinent imaging results/findings within the past 24 hours.

## 2023-07-11 NOTE — PT/OT/SLP PROGRESS
"Physical Therapy Treatment    Patient Name:  Rachel Zazueta   MRN:  [id number]    Recommendations:     Discharge Recommendations: LTACH (long-term acute care hospital), nursing facility, basic, nursing facility, skilled  Discharge Equipment Recommendations: none  Barriers to discharge: Inaccessible home and Decreased caregiver support    Assessment:     Rachel Zazueta is a 42 y.o. female admitted with a medical diagnosis of <principal problem not specified>.  She presents with the following impairments/functional limitations: weakness, impaired endurance, impaired self care skills, impaired functional mobility, gait instability, impaired balance, decreased upper extremity function, decreased lower extremity function, pain, decreased safety awareness, decreased ROM, impaired skin, impaired joint extensibility, impaired muscle length.    Rehab Prognosis: Fair; patient would benefit from acute skilled PT services to address these deficits and reach maximum level of function.    Recent Surgery: * No surgery found *      Plan:     During this hospitalization, patient to be seen 5 x/week to address the identified rehab impairments via therapeutic activities and progress toward the following goals:    Plan of Care Expires:  07/14/23    Subjective     Chief Complaint: Patient is crying with pain with self ROM ex on B LE and R UE and during rolling to sides with dependent assistance.   Patient/Family Comments/goals: "to decrease pain".  Pain/Comfort:  Pain Rating 1: 10/10  Location - Side 1: Bilateral  Location - Orientation 1: generalized  Location 1: other (see comments) (legs, feet and right shoulder)  Pain Addressed 1: Reposition, Cessation of Activity  Pain Rating Post-Intervention 1: 10/10      Objective:     Communicated with patient prior to session.  Patient found supine with gu catheter, peripheral IV, telemetry, Other (comments) (bilateral heel protector) upon PT entry to room.     General Precautions: " Standard, contact, fall  Orthopedic Precautions: N/A  Braces: N/A  Respiratory Status: Room air     Functional Mobility:  Bed Mobility:     Rolling Left:  dependence  Rolling Right: dependence      AM-PAC 6 CLICK MOBILITY  Turning over in bed (including adjusting bedclothes, sheets and blankets)?: 2  Sitting down on and standing up from a chair with arms (e.g., wheelchair, bedside commode, etc.): 1  Moving from lying on back to sitting on the side of the bed?: 1  Moving to and from a bed to a chair (including a wheelchair)?: 1  Need to walk in hospital room?: 1  Climbing 3-5 steps with a railing?: 1  Basic Mobility Total Score: 7       Treatment & Education:  Patient performed self ROM ex on B LE/UE followed by AAROM ex by the therapist with limitations due to pain, rolling to sides with dependetA and bed repositioing using pillows.    Patient left HOB elevated with all lines intact, call button in reach, bed alarm on, and nursing notified..    GOALS:   Multidisciplinary Problems       Physical Therapy Goals          Problem: Physical Therapy    Goal Priority Disciplines Outcome Goal Variances Interventions   Physical Therapy Goal     PT, PT/OT      Description:   Patient will increase functional independence with mobility by performin. Pt able to perform and tolerate B LE ROM ex x 10 reps on each plane to inc mobility, inc body repositioning  and prevent in declining in functions.  2. Inc rolling to sides to minimal assistance and cues  3. Able to perform and tolerate  supine <>sit with Moderate Assistance and cues.  4. Able to perform and tolerate static sit at edge of the bed x ~10 minutes for inc participation and performance with self care activities.  5. Establish home ex program.                          Time Tracking:     PT Received On: 23  PT Start Time: 910     PT Stop Time: 930  PT Total Time (min): 20 min     Billable Minutes: Therapeutic Activity 20    Treatment Type: Treatment  PT/PTA: PT            07/11/2023

## 2023-07-11 NOTE — ASSESSMENT & PLAN NOTE
Component of hypoalbuminemia and suspicion for underlying infection  U/A abnormal but unclear if this is source  Blood cultures pending. If negative at 48-72 hours and vitals remain stable I would feel comfortable with discharge   MAP stable in 70s  7/9---Will give her a 500ml bolus as her systolic dipped to 88 this am    1 unit of pRBC, decrease gabapentin     7/11 BP still hypotensive. 500cc bolus ordered.  D/c suboxone and tizanidine.  Decreased gabapentin.  Echo pending.  Cortisol and ACTH wnl.

## 2023-07-11 NOTE — ASSESSMENT & PLAN NOTE
Chronic debility  Patient unable to care for herself  CM working placement to LTACH but denied.  Now attempting NH placement  PT is seeing her

## 2023-07-11 NOTE — ASSESSMENT & PLAN NOTE
+ methamphetamine use on UDS  Cessation resources from CM at discharge   D/c suboxone for hypotension  Psych consulted for substance use disorder

## 2023-07-11 NOTE — PLAN OF CARE
07/11/23 1226   Post-Acute Status   Post-Acute Authorization Placement   Post-Acute Placement Status Discharge Plan Changed   Discharge Delays (!) Payor Issues         [facility] submitted to patient's payor for LTAC placement but was denied due to no IV antibiotics required for patient at this time. SW to begin process of SNF placement.

## 2023-07-11 NOTE — PLAN OF CARE
Recommendations  1. Rec'd continue low sodium diet.   2. Rec'd Srinivasa BID. Please mix into 8-10 oz of water, sprite or juice to encourage consumption and promote wound healing.   3. Rec'd Boost Plus if PO intake inadequate.   4. RD to follow and make rec's accordingly.    Interventions:  1. Collaboration with other medical providers  2. Commercial beverage    Goals: Pt will meet at least 75% ENN by RD follow up.  Nutrition Goal Status: goal met

## 2023-07-11 NOTE — PROGRESS NOTES
[place] - Med Surg (3rd Fl)  Adult Nutrition  Consult Note    SUMMARY     Recommendations  1. Rec'd continue low sodium diet.   2. Rec'd Srinivasa BID. Please mix into 8-10 oz of water, sprite or juice to encourage consumption and promote wound healing.   3. Rec'd Boost Plus if PO intake inadequate.   4. RD to follow and make rec's accordingly.    Interventions:  1. Collaboration with other medical providers  2. Commercial beverage    Goals: Pt will meet at least 75% ENN by RD follow up.  Nutrition Goal Status: goal met  Communication of RD Recs: other (comment) (POC)    Assessment and Plan    Nutrition Problem  Increased protein and energy needs    Related to (etiology):   Altered skin integrity and other conditions requiring increased nutritional needs    Signs and Symptoms (as evidenced by):   Bilateral wounds to heel, sacrum; bed bound status, liver cirrhosis, UDS + for amphetamines and h/o substance abuse    Interventions/Recommendations (treatment strategy):  Interventions:  1. Collaboration with other medical providers  2. Commercial beverage    Recommendations:  1. Rec'd continue low sodium diet.   2. Rec'd Srinivasa BID. Please mix into 8-10 oz of water, sprite or juice to encourage consumption and promote wound healing.   3. Rec'd Boost Plus if PO intake inadequate.   4. RD to follow and make rec's accordingly.    Goals: Pt will meet at least 75% ENN by RD follow up.  Nutrition Goal Status: goal met      Nutrition Diagnosis Status:   Improving           Malnutrition Assessment                                       Reason for Assessment    Reason For Assessment: consult (hypoalbuminemia)  Diagnosis: other (see comments) (no active principal problem)  Interdisciplinary Rounds: did not attend  General Information Comments:   Pt continues on low sodium diet with Srinivasa BID. Meeting ENN with % meal consumption. Drinking Srinivasa ONS BID and verbalized understanding of what it is for. Denies recent weight loss and  "N/V/C/D. Encouraged pt to continue with Srinivasa regimen and meal intake to promote wound healing. Noted iron panel completed indicating anemia of chronic disease. NFPE not appropriate at this time due to RD assessment findings. Will continue to monitor.      Nutrition Discharge Planning: Pt to dc on low sodium diet and Srinivasa BID x 4 weeks from 7/7/2023     Nutrition Risk Screen    Nutrition Risk Screen: large or nonhealing wound, burn or pressure injury    Nutrition/Diet History  Food Preferences: macaroni and cheese, Coke, Sprite  Spiritual, Cultural Beliefs, Confucianist Practices, Values that Affect Care: no  Food Allergies: shellfish, tree nut, other (see comments) (iodine)  Factors Affecting Nutritional Intake: None identified at this time    Anthropometrics    Temp: 97.6 °F (36.4 °C)  Height Method: Stated  Height: 5' 7" (170.2 cm)  Height (inches): 67 in  Weight Method: Bed Scale  Weight: 118.9 kg (262 lb 2 oz)  Weight (lb): 262.13 lb  Ideal Body Weight (IBW), Female: 135 lb  % Ideal Body Weight, Female (lb): 194.17 %  BMI (Calculated): 41  BMI Grade: greater than 40 - morbid obesity       Lab/Procedures/Meds    Pertinent Labs Reviewed: reviewed  Pertinent Labs Comments: UDS + for amphetamines  Lab Results   Component Value Date    WBC 2.75 (L) 07/11/2023    RBC 2.65 (L) 07/11/2023    HGB 8.1 (L) 07/11/2023    HCT 26.8 (L) 07/11/2023     (H) 07/11/2023    MCH 30.6 07/11/2023    MCHC 30.2 (L) 07/11/2023    RDW 20.4 (H) 07/11/2023    PLT 86 (L) 07/11/2023     Lab Results   Component Value Date     (L) 07/11/2023    CALCIUM 7.5 (L) 07/11/2023      IRON 77 07/09/2023    TRANSFERRIN 65 (L) 07/09/2023    TIBC 96 (L) 07/09/2023    FESATURATED 80 (H) 07/09/2023        Pertinent Medications Reviewed: reviewed  Pertinent Medications Comments: abx, lactulose, PPI    Physical Findings/Assessment  Prince Score 14       Estimated/Assessed Needs    Weight Used For Calorie Calculations: 61.4 kg (135 lb 5.8 oz) " (IBW)  Energy Calorie Requirements (kcal): 7064-5040  Energy Need Method: Kcal/kg (25-30 kcal/kg for wounds with obesity, cirrhosis, and substance abuse)  Protein Requirements: 77-92 g (1.25-1.5 g/kg/d IBW for wounds, substance abuse and cirrhosis)  Weight Used For Protein Calculations: 61.4 kg (135 lb 5.8 oz) (IBW)  Fluid Requirements (mL): 2397-5493  Estimated Fluid Requirement Method: RDA Method (or per MD rec's)  RDA Method (mL): 1535         Nutrition Prescription Ordered    Current Diet Order: low sodium  Oral Nutrition Supplement: Srinivasa BID    Evaluation of Received Nutrient/Fluid Intake    I/O: +333  Energy Calories Required: meeting needs  Protein Required: meeting needs  Comments: 7/8/2023  Tolerance: tolerating  % Intake of Estimated Energy Needs: 75 - 100 %  % Meal Intake: 75 - 100 %    Nutrition Risk    Level of Risk/Frequency of Follow-up: moderate - high       Monitor and Evaluation    Food and Nutrient Intake: energy intake, food and beverage intake, enteral nutrition intake  Food and Nutrient Adminstration: diet order  Knowledge/Beliefs/Attitudes: food and nutrition knowledge/skill, beliefs and attitudes  Physical Activity and Function: nutrition-related ADLs and IADLs, factors affecting access to physical activity  Anthropometric Measurements: height/length, weight, weight change, body mass index  Biochemical Data, Medical Tests and Procedures: electrolyte and renal panel, gastrointestinal profile, glucose/endocrine profile, inflammatory profile, lipid profile  Nutrition-Focused Physical Findings: overall appearance       Nutrition Follow-Up    RD Follow-up?: Yes

## 2023-07-11 NOTE — PROGRESS NOTES
[facility] Surg ([facility])  [facility] Medicine  Progress Note    Patient Name: Rachel Zazueta  MRN: [id number]  Patient Class: IP- Inpatient   Admission Date: 7/6/2023  Length of Stay: 4 days  Attending Physician: Brad Zazueta MD  Primary Care Provider: Dannielle Merino DO        Subjective:     Principal Problem:<principal problem not specified>        HPI:  Patient is a 42 year old female with medical history of anxiety, depression, substance use disorder (+ meth on UDS), hepatitis C with liver cirrhosis and TIPS procedure, spinal fusions and discits who presented to the ED with right arm pain.  Found to be hypotensive.  She has had dysuria but denies nausea, vomiting and abdominal pain.  Right arm pain started when she fell out of her wheel chair one week ago.  She is having difficulty moving it.  Pain is in elbow and shoulder.  She has intermittent chest pain that occurs mostly when she is pain elsewhere.  She has been unable to ambulate after discitis and has sores on her heels.          Admitted for Right arm pain and hypotension.        Overview/Hospital Course:  BP is stable, no MAP less than 65, most readings in the 70s   Afebrile   Blood Cx NGTD   Xrays with significant DJD C3/4/5  Shoulder xray with high riding humeral head suggesting a supraspinatous tear, but she is in too much pain to examine. Will likely need an MRI as outpt.    7/9  MAP is 65-84  UOP 36cc/hr  Remains Afebrile   Blood Cx NGTD     PT blood pressure on lower side.  Will decrease gabapentin.  H/H is low and 1 unit of pRBC ordered.  Risks and benefits discussed with patient.  Low suspicion for infection and IV meropenem discontinued.  I believe hypotension is due to volume depletion due to hypoalbuminemia and anemia of chronic disease.  Goal map greater than 65.      PT with continued bp on lower side. 500cc bolus ordered.  Suboxone and tizanidine d/c.  Decreased gabapentin.   H/H improved with 1 unit blood transfusion.   Will continue to monitor.  Suspect bp on lower side due to liver disease but goal would be to maintain MAP greater than 65.  No obvious signs of infection.  Psych consulted for NH placement.        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9    CHOLECYSTECTOMY      KIDNEY SURGERY      LUMBAR FUSION Bilateral 3/2/2022    Procedure: FUSION, SPINE, LUMBAR;  Surgeon: Guille Templeton MD;  Location: [facility] OR [address] [place] [address];  Service: Neurosurgery;  Laterality: Bilateral;  AIRO  T10--Pelvis    LUMBAR FUSION N/A 1/24/2023    Procedure: **AIRO** T8-T12 POSTERIOR FUSION, T8-T10 LAMINECTOMY, HARDWARE REMOVAL AND WASHOUT;  Surgeon: Guille Templeton MD;  [facility];  Service: Neurosurgery;  Laterality: N/A;    REMOVAL OF HARDWARE FROM SPINE N/A 2/21/2022    Procedure: REMOVAL, HARDWARE, SPINE;  Surgeon: Guille Templeton MD;  [facility];  Service: Neurosurgery;  Laterality: N/A;  [place]    SPINE SURGERY      THORACIC LAMINECTOMY WITH FUSION N/A 2/2/2023    Procedure: LAMINECTOMY, SPINE, THORACIC, WITH FUSION (T4-Pelvis fusion w/ T9-10 corpectomies) **AIRO;  Surgeon: Guille Templeton MD;  [facility];  Service: Neurosurgery;  Laterality: N/A;  AIRO, 2 bovies, aquamantys, Globus, Depuy, antibiotic beads, TXA, Peroxide; [employer] plastics closure       Review of patient's allergies indicates:   Allergen Reactions    Bee venom protein (honey bee) Anaphylaxis     Patient reports she is allergic to bee stings.    Naproxen Anaphylaxis      Throat closing    12-23- Patient reports taking Ibuprofen 200 mg at home without problems. Verified X3. [place]    Wasp sting [allergen ext-venom-honey bee] Anaphylaxis    Adhesive Blisters    Shellfish containing products     Iodine and iodide containing products Hives and Itching     Allergic to iodine in seafood only    Nuts [tree nut] Rash       No current facility-administered medications on file prior to encounter.     Current Outpatient Medications on File Prior to Encounter   Medication Sig    bumetanide (BUMEX) 1 MG tablet Take 1 tablet (1 mg total) by mouth once daily.    buprenorphine-naloxone 8-2 mg (SUBOXONE) 8-2 mg Place under the tongue 2 (two) times a day.    DULoxetine (CYMBALTA) 60 MG capsule Take 60 mg by mouth once daily.    hydrOXYzine HCL (ATARAX) 25 MG tablet Take 1 tablet (25 mg total) by mouth 3 (three) times daily as needed for Anxiety.    hydrOXYzine pamoate (VISTARIL) 25 MG Cap Take 25 mg by mouth 2 (two) times daily as needed.    lactulose (CHRONULAC) 20 gram/30 mL Soln Take 30 mLs (20 g total) by mouth 3 (three) times daily.    melatonin (MELATIN) 3 mg tablet Take 2 tablets (6 mg total) by mouth nightly as needed for Insomnia.    pantoprazole (PROTONIX) 40 MG tablet Take 1 tablet (40 mg total) by mouth once daily.    spironolactone (ALDACTONE) 100 MG tablet Take 1 tablet (100 mg total) by mouth once daily.    tiZANidine (ZANAFLEX) 4 MG tablet Take 1 tablet (4 mg total) by mouth 3 (three) times daily as needed (Pain/muscle spasms).    acetaminophen (TYLENOL) 500 MG tablet Take 2 tablets (1,000 mg total) by mouth every 12 (twelve) hours. (Patient not taking: Reported on 6/22/2023)    calcium carbonate (TUMS) 200 mg calcium (500 mg) chewable tablet Take 2 tablets (1,000 mg total) by mouth 3 (three) times daily as needed.    folic acid (FOLVITE) 1 MG tablet Take 1 tablet (1 mg total) by mouth once daily. (Patient taking differently: Take 1 mg by mouth daily as needed (vitamin  supplement).)    gabapentin (NEURONTIN) 300 MG capsule Take 3 capsules (900 mg total) by mouth 3 (three) times daily.    ondansetron (ZOFRAN) 4 MG tablet Take 8 mg by mouth 2 (two) times daily as needed for Nausea.    oxyCODONE (ROXICODONE) 5 MG immediate release tablet Take 5 mg by mouth.    polyethylene glycol (GLYCOLAX) 17 gram PwPk Take 17 g by mouth daily as needed. (Patient not taking: Reported on 6/22/2023)    [DISCONTINUED] diclofenac sodium (VOLTAREN) 1 % Gel Apply 2 g topically 4 (four) times daily.     Family History       Problem Relation (Age of Onset)    Cirrhosis Father    Drug abuse Mother, Father    Hepatitis Mother, Father    Liver cancer Mother          Tobacco Use    Smoking status: Some Days     Packs/day: 0.50     Years: 23.00     Pack years: 11.50     Types: Cigarettes    Smokeless tobacco: Never    Tobacco comments:     patient states she knows she nees to quit.   Substance and Sexual Activity    Alcohol use: Not Currently     Comment: quit 2014    Drug use: Not Currently     Frequency: 4.0 times per week     Types: Marijuana     Comment: Patient denies needle use, only inhalation of methampehtamines or orally.  Last known drug use was prior to admission to hospital stay for surgery    Sexual activity: Yes     Partners: Male     Birth control/protection: None     Review of Systems   Constitutional:  Positive for fatigue. Negative for chills and fever.   HENT:  Negative for ear discharge and ear pain.    Eyes:  Negative for pain and discharge.   Respiratory:  Negative for cough and shortness of breath.    Cardiovascular:  Negative for chest pain (mostly associated when other areas in her body are in pain) and leg swelling.   Gastrointestinal:  Negative for nausea and vomiting.   Endocrine: Negative for cold intolerance and heat intolerance.   Genitourinary:  Negative for difficulty urinating and dysuria.   Musculoskeletal:  Positive for arthralgias and gait problem. Negative for  myalgias.        Right arm pain    Skin:  Positive for wound. Negative for rash.   Neurological:  Negative for dizziness and headaches.   Psychiatric/Behavioral:  Negative for agitation and confusion.    Objective:     Vital Signs (Most Recent):  Temp: 97.6 °F (36.4 °C) (07/11/23 1142)  Pulse: 68 (07/11/23 1404)  Resp: 17 (07/11/23 1142)  BP: (!) 80/44 (07/11/23 1318)  SpO2: 95 % (07/11/23 1142) Vital Signs (24h Range):  Temp:  [97.6 °F (36.4 °C)-99.2 °F (37.3 °C)] 97.6 °F (36.4 °C)  Pulse:  [67-93] 68  Resp:  [10-18] 17  SpO2:  [95 %-100 %] 95 %  BP: ()/(44-62) 80/44     Weight: 118.9 kg (262 lb 2 oz)  Body mass index is 41.05 kg/m².     Physical Exam  Constitutional:       Appearance: Normal appearance.      Comments: Continued generalized pain     HENT:      Head: Normocephalic and atraumatic.   Cardiovascular:      Rate and Rhythm: Normal rate and regular rhythm.   Pulmonary:      Effort: Pulmonary effort is normal. No respiratory distress.   Abdominal:      General: Bowel sounds are normal. There is no distension.      Palpations: Abdomen is soft.      Tenderness: There is no abdominal tenderness.   Musculoskeletal:         General: Tenderness present.      Right lower leg: No edema.      Left lower leg: No edema.      Comments: RUE tenderness and decreased ROM  She will not cooperate to do a good shoulder exam due to pain   ROM improving today    Skin:     General: Skin is warm and dry.      Comments: Multiple wounds on  bilateral heels and sacral region    Neurological:      Mental Status: She is alert and oriented to person, place, and time. Mental status is at baseline.   Psychiatric:         Mood and Affect: Mood normal.         Behavior: Behavior normal.              Significant Labs: A1C: No results for input(s): HGBA1C in the last 4320 hours.  ABGs: No results for input(s): PH, PCO2, HCO3, POCSATURATED, BE, TOTALHB, COHB, METHB, O2HB, POCFIO2, PO2 in the last 48 hours.  Bilirubin:   Recent Labs    Lab 06/16/23  0519 07/06/23  2249 07/07/23  1133 07/08/23  0604 07/10/23  0559   BILITOT 2.4* 3.1* 3.2* 3.0* 2.4*       Blood Culture:   No results for input(s): LABBLOO in the last 48 hours.    BMP:   Recent Labs   Lab 07/11/23  0609      *   K 4.9      CO2 24   BUN 16   CREATININE 0.5   CALCIUM 7.5*       CBC:   Recent Labs   Lab 07/10/23  0559 07/11/23  0609   WBC 3.43* 2.75*   HGB 7.3* 8.1*   HCT 24.5* 26.8*   PLT 94* 86*       CMP:   Recent Labs   Lab 07/10/23  0559 07/11/23  0609   * 131*   K 5.2* 4.9    103   CO2 26 24   GLU 93 103   BUN 18 16   CREATININE 0.6 0.5   CALCIUM 7.4* 7.5*   PROT 6.6  --    ALBUMIN 1.4*  --    BILITOT 2.4*  --    ALKPHOS 149*  --    AST 33  --    ALT 10  --    ANIONGAP 1* 4*       Cardiac Markers:   No results for input(s): CKMB, MYOGLOBIN, BNP, TROPISTAT in the last 48 hours.    Coagulation:   No results for input(s): PT, INR, APTT in the last 48 hours.    Lactic Acid:   No results for input(s): LACTATE in the last 48 hours.    Lipase: No results for input(s): LIPASE in the last 48 hours.  Lipid Panel: No results for input(s): CHOL, HDL, LDLCALC, TRIG, CHOLHDL in the last 48 hours.  Magnesium:   No results for input(s): MG in the last 48 hours.    POCT Glucose: No results for input(s): POCTGLUCOSE in the last 48 hours.  Prealbumin: No results for input(s): PREALBUMIN in the last 48 hours.  Respiratory Culture: No results for input(s): GSRESP, RESPIRATORYC in the last 48 hours.  Troponin:   No results for input(s): TROPONINI, TROPONINIHS in the last 48 hours.    TSH: No results for input(s): TSH in the last 4320 hours.  Urine Culture: No results for input(s): LABURIN in the last 48 hours.  Urine Studies:   Recent Labs   Lab 07/10/23  1840   COLORU Yellow   APPEARANCEUA Clear   PHUR 7.0   SPECGRAV 1.010   PROTEINUA Negative   GLUCUA Negative   KETONESU Negative   BILIRUBINUA Negative   OCCULTUA Trace*   NITRITE Negative   UROBILINOGEN 2.0-3.0*    LEUKOCYTESUR Trace*   RBCUA 1   WBCUA 1   BACTERIA Rare         Significant Imaging: I have reviewed all pertinent imaging results/findings within the past 24 hours.      Assessment/Plan:      Hypoalbuminemia  Lab Results   Component Value Date    ALBUMIN 1.4 (L) 07/10/2023       Dietician consult-- Continue Srinivasa BID per recs     Wounds, multiple  Bilateral heels with escharSacral region    General surgery consulted---santyl ordered. Will likely need to be debrided at some point       Right arm pain  Right arm pain and decreased ROM  XRAY cspine with DJD C3/4/5. This could cause some radicular pain radiating into the shoulder... she is on gabapentin  Her xray of shoulder shows that she has a high riding humeral head, concerning for a supraspinatus tear...she can address this as an outpt with an MRI      Gabapentin.        Hypotension  Component of hypoalbuminemia and suspicion for underlying infection  U/A abnormal but unclear if this is source  Blood cultures pending. If negative at 48-72 hours and vitals remain stable I would feel comfortable with discharge   MAP stable in 70s  7/9---Will give her a 500ml bolus as her systolic dipped to 88 this am    1 unit of pRBC, decrease gabapentin     7/11 BP still hypotensive. 500cc bolus ordered.  D/c suboxone and tizanidine.  Decreased gabapentin.  Echo pending.  Cortisol and ACTH wnl.        Weakness of both lower extremities  After discitis   Unable to ambulate       Debility  Chronic debility  Patient unable to care for herself  CM working placement to [facility] but denied.  Now attempting NH placement  PT is seeing her       Substance use disorder  + methamphetamine use on UDS  Cessation resources from CM at discharge   D/c suboxone for hypotension  Psych consulted for substance use disorder      Cirrhosis of liver with ascites  Monitor AST and ALT  Ok to give her tylenol for pain, as long as she is getting less than 2g/day total         Abnormal urinalysis  + nitrites  on U/A; WB on microscopic mildly elevated and only trace amount of leukocytes  Will tx due to symptomatic  H/o ESBL klebsiella Pneumoniae  D/c IV meropenem.  Low suspicion for UTI.  U/A did not reflex.          VTE Risk Mitigation (From admission, onward)         Ordered     IP VTE HIGH RISK PATIENT  Once         07/07/23 0107     Place sequential compression device  Until discontinued         07/07/23 0107                Discharge Planning   SHIRA:      Code Status: Full Code   Is the patient medically ready for discharge?:     Reason for patient still in hospital (select all that apply): Patient trending condition  Discharge Plan A: Long-term acute care facility (LTAC)   Discharge Delays: (!) Payor Issues              Leyla Blair PA-C  Department of Hospital Medicine   [place] [facility] Surg (3rd Fl)

## 2023-07-11 NOTE — PLAN OF CARE
07/11/23 1424   Post-Acute Status   Post-Acute Authorization Placement   Post-Acute Placement Status Referrals Sent   Discharge Delays None known at this time       Patient remains awaiting placement. LTAC declined by payor. SNF now being sought. ALICIA sent referral to the following nursing homes:    [facility]-- Declined (SNF not covered by payor at facility)   [facility]    Awaiting responses before sending additional referrals as patient has expressed that she would like to remain as close to home as possible. Patient will be very difficult to place in a nursing home due to payor, substance use and mental health diagnosis.     [facility] faxed and [facility] called in. Awaiting 142. Suspect Level II Screen will need to be completed. Psych consult requested and ordered.     ALICIA to remain available.

## 2023-07-11 NOTE — PLAN OF CARE
Problem: Skin Injury Risk Increased  Goal: Skin Health and Integrity  Outcome: Ongoing, Progressing     Problem: Impaired Wound Healing  Goal: Optimal Wound Healing  Outcome: Ongoing, Progressing     Problem: Bariatric Environmental Safety  Goal: Safety Maintained with Care  Outcome: Ongoing, Progressing

## 2023-07-11 NOTE — ASSESSMENT & PLAN NOTE
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 This could cause some radicular pain radiating into the shoulder... she is on gabapentin  Her xray of shoulder shows that she has a high riding humeral head, concerning for a supraspinatus tear...she can address this as an outpt with an MRI      Gabapentin.

## 2023-07-12 ENCOUNTER — PATIENT MESSAGE (OUTPATIENT)
Dept: ADMINISTRATIVE | Facility: HOSPITAL | Age: 43
End: 2023-07-12
Payer: MEDICAID

## 2023-07-12 PROBLEM — F33.2 SEVERE EPISODE OF RECURRENT MAJOR DEPRESSIVE DISORDER, WITHOUT PSYCHOTIC FEATURES: Status: ACTIVE | Noted: 2023-07-12

## 2023-07-12 LAB
ANION GAP SERPL CALC-SCNC: 3 MMOL/L (ref 8–16)
BACTERIA BLD CULT: NORMAL
BACTERIA BLD CULT: NORMAL
BASOPHILS # BLD AUTO: 0.02 K/UL (ref 0–0.2)
BASOPHILS NFR BLD: 0.9 % (ref 0–1.9)
BUN SERPL-MCNC: 16 MG/DL (ref 6–20)
CALCIUM SERPL-MCNC: 7.5 MG/DL (ref 8.7–10.5)
CHLORIDE SERPL-SCNC: 107 MMOL/L (ref 95–110)
CO2 SERPL-SCNC: 24 MMOL/L (ref 23–29)
CREAT SERPL-MCNC: 0.5 MG/DL (ref 0.5–1.4)
DIFFERENTIAL METHOD: ABNORMAL
EOSINOPHIL # BLD AUTO: 0.1 K/UL (ref 0–0.5)
EOSINOPHIL NFR BLD: 2.3 % (ref 0–8)
ERYTHROCYTE [DISTWIDTH] IN BLOOD BY AUTOMATED COUNT: 20.7 % (ref 11.5–14.5)
EST. GFR  (NO RACE VARIABLE): >60 ML/MIN/1.73 M^2
GLUCOSE SERPL-MCNC: 73 MG/DL (ref 70–110)
HCT VFR BLD AUTO: 25.8 % (ref 37–48.5)
HGB BLD-MCNC: 7.7 G/DL (ref 12–16)
IMM GRANULOCYTES # BLD AUTO: 0.03 K/UL (ref 0–0.04)
IMM GRANULOCYTES NFR BLD AUTO: 1.4 % (ref 0–0.5)
LYMPHOCYTES # BLD AUTO: 0.6 K/UL (ref 1–4.8)
LYMPHOCYTES NFR BLD: 25.5 % (ref 18–48)
MCH RBC QN AUTO: 30.3 PG (ref 27–31)
MCHC RBC AUTO-ENTMCNC: 29.8 G/DL (ref 32–36)
MCV RBC AUTO: 102 FL (ref 82–98)
MONOCYTES # BLD AUTO: 0.2 K/UL (ref 0.3–1)
MONOCYTES NFR BLD: 8.8 % (ref 4–15)
NEUTROPHILS # BLD AUTO: 1.3 K/UL (ref 1.8–7.7)
NEUTROPHILS NFR BLD: 61.1 % (ref 38–73)
NRBC BLD-RTO: 0 /100 WBC
PLATELET # BLD AUTO: 83 K/UL (ref 150–450)
PMV BLD AUTO: 10.3 FL (ref 9.2–12.9)
POTASSIUM SERPL-SCNC: 4.9 MMOL/L (ref 3.5–5.1)
RBC # BLD AUTO: 2.54 M/UL (ref 4–5.4)
SODIUM SERPL-SCNC: 134 MMOL/L (ref 136–145)
WBC # BLD AUTO: 2.16 K/UL (ref 3.9–12.7)

## 2023-07-12 PROCEDURE — 99233 SBSQ HOSP IP/OBS HIGH 50: CPT | Mod: 95,,, | Performed by: PHYSICIAN ASSISTANT

## 2023-07-12 PROCEDURE — 80048 BASIC METABOLIC PNL TOTAL CA: CPT | Performed by: PHYSICIAN ASSISTANT

## 2023-07-12 PROCEDURE — 99223 PR INITIAL HOSPITAL CARE,LEVL III: ICD-10-PCS | Mod: AF,HB,, | Performed by: STUDENT IN AN ORGANIZED HEALTH CARE EDUCATION/TRAINING PROGRAM

## 2023-07-12 PROCEDURE — 99223 1ST HOSP IP/OBS HIGH 75: CPT | Mod: AF,HB,, | Performed by: STUDENT IN AN ORGANIZED HEALTH CARE EDUCATION/TRAINING PROGRAM

## 2023-07-12 PROCEDURE — 99900031 HC PATIENT EDUCATION (STAT)

## 2023-07-12 PROCEDURE — 36415 COLL VENOUS BLD VENIPUNCTURE: CPT | Performed by: PHYSICIAN ASSISTANT

## 2023-07-12 PROCEDURE — 99900035 HC TECH TIME PER 15 MIN (STAT)

## 2023-07-12 PROCEDURE — 97165 OT EVAL LOW COMPLEX 30 MIN: CPT

## 2023-07-12 PROCEDURE — 11000001 HC ACUTE MED/SURG PRIVATE ROOM

## 2023-07-12 PROCEDURE — 97110 THERAPEUTIC EXERCISES: CPT

## 2023-07-12 PROCEDURE — 27000207 HC ISOLATION

## 2023-07-12 PROCEDURE — 90833 PSYTX W PT W E/M 30 MIN: CPT | Mod: AF,HB,, | Performed by: STUDENT IN AN ORGANIZED HEALTH CARE EDUCATION/TRAINING PROGRAM

## 2023-07-12 PROCEDURE — 25000003 PHARM REV CODE 250: Performed by: SURGERY

## 2023-07-12 PROCEDURE — 85025 COMPLETE CBC W/AUTO DIFF WBC: CPT | Performed by: PHYSICIAN ASSISTANT

## 2023-07-12 PROCEDURE — 90833 PR PSYCHOTHERAPY W/PATIENT W/E&M, 30 MIN (ADD ON): ICD-10-PCS | Mod: AF,HB,, | Performed by: STUDENT IN AN ORGANIZED HEALTH CARE EDUCATION/TRAINING PROGRAM

## 2023-07-12 PROCEDURE — 94761 N-INVAS EAR/PLS OXIMETRY MLT: CPT

## 2023-07-12 PROCEDURE — 99233 PR SUBSEQUENT HOSPITAL CARE,LEVL III: ICD-10-PCS | Mod: 95,,, | Performed by: PHYSICIAN ASSISTANT

## 2023-07-12 PROCEDURE — 25000003 PHARM REV CODE 250: Performed by: PHYSICIAN ASSISTANT

## 2023-07-12 PROCEDURE — 25000003 PHARM REV CODE 250: Performed by: FAMILY MEDICINE

## 2023-07-12 RX ORDER — MIDODRINE HYDROCHLORIDE 2.5 MG/1
2.5 TABLET ORAL 3 TIMES DAILY
Status: DISCONTINUED | OUTPATIENT
Start: 2023-07-12 | End: 2023-07-13

## 2023-07-12 RX ADMIN — GABAPENTIN 100 MG: 100 CAPSULE ORAL at 09:07

## 2023-07-12 RX ADMIN — COLLAGENASE SANTYL: 250 OINTMENT TOPICAL at 09:07

## 2023-07-12 RX ADMIN — SODIUM CHLORIDE: 9 INJECTION, SOLUTION INTRAVENOUS at 05:07

## 2023-07-12 RX ADMIN — ACETAMINOPHEN 500 MG: 500 TABLET ORAL at 02:07

## 2023-07-12 RX ADMIN — GABAPENTIN 100 MG: 100 CAPSULE ORAL at 02:07

## 2023-07-12 RX ADMIN — MIDODRINE HYDROCHLORIDE 2.5 MG: 2.5 TABLET ORAL at 09:07

## 2023-07-12 RX ADMIN — PANTOPRAZOLE SODIUM 40 MG: 40 TABLET, DELAYED RELEASE ORAL at 09:07

## 2023-07-12 RX ADMIN — MIDODRINE HYDROCHLORIDE 2.5 MG: 2.5 TABLET ORAL at 02:07

## 2023-07-12 RX ADMIN — DULOXETINE 60 MG: 30 CAPSULE, DELAYED RELEASE ORAL at 09:07

## 2023-07-12 RX ADMIN — ACETAMINOPHEN 500 MG: 500 TABLET ORAL at 04:07

## 2023-07-12 NOTE — PROGRESS NOTES
[facility] Medicine  Progress Note    Patient Name: Rachel Zazueta  MRN: [id number]  Patient Class: IP- Inpatient   Admission Date: 7/6/2023  Length of Stay: 5 days  Attending Physician: Brad Zazueta MD  Primary Care Provider: Dannielle Merino DO        Subjective:     Principal Problem:<principal problem not specified>        HPI:  Patient is a 42 year old female with medical history of anxiety, depression, substance use disorder (+ meth on UDS), hepatitis C with liver cirrhosis and TIPS procedure, spinal fusions and discits who presented to the ED with right arm pain.  Found to be hypotensive.  She has had dysuria but denies nausea, vomiting and abdominal pain.  Right arm pain started when she fell out of her wheel chair one week ago.  She is having difficulty moving it.  Pain is in elbow and shoulder.  She has intermittent chest pain that occurs mostly when she is pain elsewhere.  She has been unable to ambulate after discitis and has sores on her heels.          Admitted for Right arm pain and hypotension.        Overview/Hospital Course:  BP is stable, no MAP less than 65, most readings in the 70s   Afebrile   Blood Cx NGTD   Xrays with significant DJD C3/4/5  Shoulder xray with high riding humeral head suggesting a supraspinatous tear, but she is in too much pain to examine. Will likely need an MRI as outpt.    7/9  MAP is 65-84  UOP 36cc/hr  Remains Afebrile   Blood Cx NGTD     PT blood pressure on lower side.  Will decrease gabapentin.  H/H is low and 1 unit of pRBC ordered.  Risks and benefits discussed with patient.  Low suspicion for infection and IV meropenem discontinued.  I believe hypotension is due to volume depletion due to hypoalbuminemia and anemia of chronic disease.  Goal map greater than 65.      PT with continued bp on lower side. 500cc bolus ordered.  Suboxone and tizanidine d/c.  Decreased gabapentin.   H/H improved with 1 unit blood transfusion.  Will  continue to monitor.  Suspect bp on lower side due to liver disease but goal would be to maintain MAP greater than 65.  No obvious signs of infection.  Psych consulted for NH placement.      7/12 PT HD with map of 70.  BP improved with IV fluids.  Pt states she has full body pain but pain medications making her hypotensive.  Will only use tylenol and gabapentin.        Past Medical History:   Diagnosis Date    Anemia     Anxiety     Depressed     Diskitis     Hep C w/o coma, chronic     IV drug abuse     Kidney stone     Liver cirrhosis        Past Surgical History:   Procedure Laterality Date    ARTHROTOMY OF SHOULDER Left 11/15/2022    Procedure: ARTHROTOMY, SHOULDER;  Surgeon: Bjorn Hayes MD;  Location: [facility];  Service: Orthopedics;  Laterality: Left;  Left acromioclavicular joint arthrotomy    BACK SURGERY      benine tumor removal      forehead, age 9    CHOLECYSTECTOMY      KIDNEY SURGERY      LUMBAR FUSION Bilateral 3/2/2022    Procedure: FUSION, SPINE, LUMBAR;  Surgeon: Guille Templeton MD;  Location: [address];  Service: Neurosurgery;  Laterality: Bilateral;  AIRO  T10--Pelvis    LUMBAR FUSION N/A 1/24/2023    Procedure: **AIRO** T8-T12 POSTERIOR FUSION, T8-T10 LAMINECTOMY, HARDWARE REMOVAL AND WASHOUT;  Surgeon: Guille Templeton MD;  [facility];  Service: Neurosurgery;  Laterality: N/A;    REMOVAL OF HARDWARE FROM SPINE N/A 2/21/2022    Procedure: REMOVAL, HARDWARE, SPINE;  Surgeon: Guille Templeton MD;  Location: [facility] OR [address] [place] [address];  Service: Neurosurgery;  Laterality: N/A;  [place]    SPINE SURGERY      THORACIC LAMINECTOMY WITH FUSION N/A 2/2/2023    Procedure: LAMINECTOMY, SPINE, THORACIC, WITH FUSION (T4-Pelvis fusion w/ T9-10 corpectomies) **AIRO;  Surgeon: Guille Templeton MD;  [facility];  Service: Neurosurgery;  Laterality: N/A;  AIRO, 2 bovies, aquamantys, Globus, Depuy, antibiotic beads, TXA, Peroxide; Babycos plastics closure       Review of  patient's allergies indicates:   Allergen Reactions    Bee venom protein (honey bee) Anaphylaxis     Patient reports she is allergic to bee stings.    Naproxen Anaphylaxis     Throat closing    12-23- Patient reports taking Ibuprofen 200 mg at home without problems. Verified X3. [place]    Wasp sting [allergen ext-venom-honey bee] Anaphylaxis    Adhesive Blisters    Shellfish containing products     Iodine and iodide containing products Hives and Itching     Allergic to iodine in seafood only    Nuts [tree nut] Rash       No current facility-administered medications on file prior to encounter.     Current Outpatient Medications on File Prior to Encounter   Medication Sig    bumetanide (BUMEX) 1 MG tablet Take 1 tablet (1 mg total) by mouth once daily.    buprenorphine-naloxone 8-2 mg (SUBOXONE) 8-2 mg Place under the tongue 2 (two) times a day.    DULoxetine (CYMBALTA) 60 MG capsule Take 60 mg by mouth once daily.    hydrOXYzine HCL (ATARAX) 25 MG tablet Take 1 tablet (25 mg total) by mouth 3 (three) times daily as needed for Anxiety.    hydrOXYzine pamoate (VISTARIL) 25 MG Cap Take 25 mg by mouth 2 (two) times daily as needed.    lactulose (CHRONULAC) 20 gram/30 mL Soln Take 30 mLs (20 g total) by mouth 3 (three) times daily.    melatonin (MELATIN) 3 mg tablet Take 2 tablets (6 mg total) by mouth nightly as needed for Insomnia.    pantoprazole (PROTONIX) 40 MG tablet Take 1 tablet (40 mg total) by mouth once daily.    spironolactone (ALDACTONE) 100 MG tablet Take 1 tablet (100 mg total) by mouth once daily.    tiZANidine (ZANAFLEX) 4 MG tablet Take 1 tablet (4 mg total) by mouth 3 (three) times daily as needed (Pain/muscle spasms).    acetaminophen (TYLENOL) 500 MG tablet Take 2 tablets (1,000 mg total) by mouth every 12 (twelve) hours. (Patient not taking: Reported on 6/22/2023)    calcium carbonate (TUMS) 200 mg calcium (500 mg) chewable tablet Take 2 tablets (1,000 mg total) by mouth 3 (three)  times daily as needed.    folic acid (FOLVITE) 1 MG tablet Take 1 tablet (1 mg total) by mouth once daily. (Patient taking differently: Take 1 mg by mouth daily as needed (vitamin supplement).)    gabapentin (NEURONTIN) 300 MG capsule Take 3 capsules (900 mg total) by mouth 3 (three) times daily.    ondansetron (ZOFRAN) 4 MG tablet Take 8 mg by mouth 2 (two) times daily as needed for Nausea.    oxyCODONE (ROXICODONE) 5 MG immediate release tablet Take 5 mg by mouth.    polyethylene glycol (GLYCOLAX) 17 gram PwPk Take 17 g by mouth daily as needed. (Patient not taking: Reported on 6/22/2023)    [DISCONTINUED] diclofenac sodium (VOLTAREN) 1 % Gel Apply 2 g topically 4 (four) times daily.     Family History       Problem Relation (Age of Onset)    Cirrhosis Father    Drug abuse Mother, Father    Hepatitis Mother, Father    Liver cancer Mother          Tobacco Use    Smoking status: Some Days     Packs/day: 0.50     Years: 23.00     Pack years: 11.50     Types: Cigarettes    Smokeless tobacco: Never    Tobacco comments:     patient states she knows she nees to quit.   Substance and Sexual Activity    Alcohol use: Not Currently     Comment: quit 2014    Drug use: Not Currently     Frequency: 4.0 times per week     Types: Marijuana     Comment: Patient denies needle use, only inhalation of methampehtamines or orally.  Last known drug use was prior to admission to hospital stay for surgery    Sexual activity: Yes     Partners: Male     Birth control/protection: None     Review of Systems   Constitutional:  Positive for fatigue. Negative for chills and fever.   HENT:  Negative for ear discharge and ear pain.    Eyes:  Negative for pain and discharge.   Respiratory:  Negative for cough and shortness of breath.    Cardiovascular:  Negative for chest pain (mostly associated when other areas in her body are in pain) and leg swelling.   Gastrointestinal:  Negative for nausea and vomiting.   Endocrine: Negative for  cold intolerance and heat intolerance.   Genitourinary:  Negative for difficulty urinating and dysuria.   Musculoskeletal:  Positive for arthralgias, gait problem and myalgias.        Right arm pain    Skin:  Positive for wound. Negative for rash.   Neurological:  Negative for dizziness and headaches.   Psychiatric/Behavioral:  Negative for agitation and confusion.    Objective:     Vital Signs (Most Recent):  Temp: 97.1 °F (36.2 °C) (07/12/23 0715)  Pulse: 76 (07/12/23 0715)  Resp: 17 (07/12/23 0715)  BP: (!) 86/51 (07/12/23 0715)  SpO2: 97 % (07/12/23 0715) Vital Signs (24h Range):  Temp:  [96.2 °F (35.7 °C)-98.3 °F (36.8 °C)] 97.1 °F (36.2 °C)  Pulse:  [67-84] 76  Resp:  [9-33] 17  SpO2:  [95 %-100 %] 97 %  BP: ()/(43-61) 86/51     Weight: 118.9 kg (262 lb 2 oz)  Body mass index is 41.05 kg/m².     Physical Exam  Constitutional:       Appearance: Normal appearance.      Comments: Continued generalized pain     HENT:      Head: Normocephalic and atraumatic.   Cardiovascular:      Rate and Rhythm: Normal rate and regular rhythm.   Pulmonary:      Effort: Pulmonary effort is normal. No respiratory distress.   Abdominal:      General: Bowel sounds are normal. There is no distension.      Palpations: Abdomen is soft.      Tenderness: There is no abdominal tenderness.   Musculoskeletal:         General: Tenderness present.      Right lower leg: No edema.      Left lower leg: No edema.      Comments: RUE tenderness and decreased ROM  She will not cooperate to do a good shoulder exam due to pain   ROM improving today    Skin:     General: Skin is warm and dry.      Comments: Multiple wounds on  bilateral heels and sacral region    Neurological:      Mental Status: She is alert and oriented to person, place, and time. Mental status is at baseline.   Psychiatric:         Mood and Affect: Mood normal.         Behavior: Behavior normal.              Significant Labs: A1C: No results for input(s): HGBA1C in the last  4320 hours.  ABGs: No results for input(s): PH, PCO2, HCO3, POCSATURATED, BE, TOTALHB, COHB, METHB, O2HB, POCFIO2, PO2 in the last 48 hours.  Bilirubin:   Recent Labs   Lab 06/16/23  0519 07/06/23  2249 07/07/23  1133 07/08/23  0604 07/10/23  0559   BILITOT 2.4* 3.1* 3.2* 3.0* 2.4*       Blood Culture:   No results for input(s): LABBLOO in the last 48 hours.    BMP:   Recent Labs   Lab 07/11/23  0609      *   K 4.9      CO2 24   BUN 16   CREATININE 0.5   CALCIUM 7.5*       CBC:   Recent Labs   Lab 07/11/23  0609   WBC 2.75*   HGB 8.1*   HCT 26.8*   PLT 86*       CMP:   Recent Labs   Lab 07/11/23  0609   *   K 4.9      CO2 24      BUN 16   CREATININE 0.5   CALCIUM 7.5*   ANIONGAP 4*       Cardiac Markers:   No results for input(s): CKMB, MYOGLOBIN, BNP, TROPISTAT in the last 48 hours.    Coagulation:   No results for input(s): PT, INR, APTT in the last 48 hours.    Lactic Acid:   Recent Labs   Lab 07/11/23  1530   LACTATE 0.9       Lipase: No results for input(s): LIPASE in the last 48 hours.  Lipid Panel: No results for input(s): CHOL, HDL, LDLCALC, TRIG, CHOLHDL in the last 48 hours.  Magnesium:   No results for input(s): MG in the last 48 hours.    POCT Glucose: No results for input(s): POCTGLUCOSE in the last 48 hours.  Prealbumin: No results for input(s): PREALBUMIN in the last 48 hours.  Respiratory Culture: No results for input(s): GSRESP, RESPIRATORYC in the last 48 hours.  Troponin:   Recent Labs   Lab 07/11/23  1530   TROPONINI 0.007       TSH: No results for input(s): TSH in the last 4320 hours.  Urine Culture: No results for input(s): LABURIN in the last 48 hours.  Urine Studies:   Recent Labs   Lab 07/10/23  1840   COLORU Yellow   APPEARANCEUA Clear   PHUR 7.0   SPECGRAV 1.010   PROTEINUA Negative   GLUCUA Negative   KETONESU Negative   BILIRUBINUA Negative   OCCULTUA Trace*   NITRITE Negative   UROBILINOGEN 2.0-3.0*   LEUKOCYTESUR Trace*   RBCUA 1   WBCUA 1    BACTERIA Rare         Significant Imaging: I have reviewed all pertinent imaging results/findings within the past 24 hours.      Assessment/Plan:      Hypoalbuminemia  Lab Results   Component Value Date    ALBUMIN 1.4 (L) 07/10/2023       Dietician consult-- Continue Srinivasa BID per recs     Wounds, multiple  Bilateral heels with escharSacral region    General surgery consulted---santyl ordered. Will likely need to be debrided at some point       Right arm pain  Right arm pain and decreased ROM  XRAY cspine with DJD C3/4/5. This could cause some radicular pain radiating into the shoulder... she is on gabapentin  Her xray of shoulder shows that she has a high riding humeral head, concerning for a supraspinatus tear...she can address this as an outpt with an MRI      Gabapentin.        Hypotension  Component of hypoalbuminemia and suspicion for underlying infection  U/A abnormal but unclear if this is source  Blood cultures pending. If negative at 48-72 hours and vitals remain stable I would feel comfortable with discharge   MAP stable in 70s  7/9---Will give her a 500ml bolus as her systolic dipped to 88 this am    1 unit of pRBC, decrease gabapentin     7/11 BP still hypotensive. 500cc bolus ordered.  D/c suboxone and tizanidine.  Decreased gabapentin.  Echo pending.  Cortisol and ACTH wnl.      7/12 Some improvement.  Holding all pain medications except gabapentin.  CT cervical spine due to continue pain.        Weakness of both lower extremities  After discitis   Unable to ambulate   CT cervical spine pending due to continued pain.        Debility  Chronic debility  Patient unable to care for herself  CM working placement to [facility] but denied.  Now attempting NH placement  PT is seeing her       Substance use disorder  + methamphetamine use on UDS  Cessation resources from  at discharge   D/c suboxone for hypotension  Psych consulted for substance use disorder      Cirrhosis of liver with ascites  Monitor AST and  ALT  Ok to give her tylenol for pain, as long as she is getting less than 2g/day total         Abnormal urinalysis  + nitrites on U/A; WB on microscopic mildly elevated and only trace amount of leukocytes  Will tx due to symptomatic  H/o ESBL klebsiella Pneumoniae  D/c IV meropenem.  Low suspicion for UTI.  U/A did not reflex.          VTE Risk Mitigation (From admission, onward)         Ordered     IP VTE HIGH RISK PATIENT  Once         07/07/23 0107     Place sequential compression device  Until discontinued         07/07/23 0107                Discharge Planning   SHIRA:      Code Status: Full Code   Is the patient medically ready for discharge?:     Reason for patient still in hospital (select all that apply): Patient trending condition  Discharge Plan A: Long-term acute care facility (LTAC)   Discharge Delays: None known at this time        Critical care time spent on the evaluation and treatment of severe organ dysfunction, review of pertinent labs and imaging studies, discussions with consulting providers and discussions with patient/family: 35 minutes.      Leyla Blair PA-C  Department of Hospital Medicine   [place] - Intensive Care

## 2023-07-12 NOTE — PT/OT/SLP EVAL
Occupational Therapy   Evaluation    Name: Rachel Zazueta  MRN: [id number]  Admitting Diagnosis: <principal problem not specified>  Recent Surgery: * No surgery found *      Recommendations:     Discharge Recommendations: LTACH (long-term acute care hospital), nursing facility, skilled, nursing facility, basic  Discharge Equipment Recommendations:  none  Barriers to discharge:  Inaccessible home environment, Decreased caregiver support    Assessment:     Rachel Zazueta is a 42 y.o. female with a medical diagnosis of <principal problem not specified>.  She presents with performance deficits affecting function: weakness, impaired endurance, impaired self care skills, impaired balance, impaired functional mobility, decreased upper extremity function, decreased lower extremity function, decreased ROM, pain. Pt unwilling to participate in bed mobility or EOB sitting for evaluation due to reported significant pain in bilateral lower extremity and right shoulder. ADL activities performed at bed level with Pt unable to perform upper extremity or LE dress due to limited mobility related to pain. Pt with total x2 assist to scoot to HOB for proper positioning for bed level ADL. She did perform oral care and face washing at bed level with set up. Pt used left upper extremity to grasp objects from table and apply toothpaste to brush but was able to use right upper extremity to brush teeth. Left upper extremity used to wash face. Overall very limited range of motion in right shoulder upon assessment with frequent reports of pain even with attempted passive range of motion.    Rehab Prognosis: Fair; patient would benefit from acute skilled OT services to address these deficits and reach maximum level of function.       Plan:     Patient to be seen 5 x/week to address the above listed problems via self-care/home management, therapeutic activities, therapeutic exercises  Plan of Care Expires:    Plan of Care Reviewed with:  "patient    Subjective     Chief Complaint: "My legs and shoulder at hurting a lot."  Patient/Family Comments/goals: To reduce pain.    Occupational Profile:  Living Environment: Prior to admit Pt lived with friend in [facility] with no JUNO.   Previous level of function: Friend assist Pt with most ADL. She reports MOD A with bathing to reach legs being wheeled on rollator seat to reach shower prior to fall. Pt reports she did not have shower chair with her friend and herself "flooding the bathroom" by placing rollator as far into shower as possible. Since fall she has been performing sponge baths in bed. MOD A also for dressing with Pt able to manage upper extremity dressing at bed level with self grooming also at bed level. She repots being able to transfer herself to [facility] prior to fall as well. Pt friend is no longer able to assist Pt with care.  Equipment Used at Home: rollator, wheelchair, hospital bed, lift device, bedside commode  Assistance upon Discharge: None    Pain/Comfort:  Pain Rating 1: 10/10  Location - Side 1: Bilateral  Location - Orientation 1: lower  Location 1: leg  Pain Addressed 1: Reposition, Cessation of Activity  Pain Rating Post-Intervention 1: 10/10  Pain Rating 2: 10/10  Location - Side 2: Right  Location - Orientation 2: upper  Location 2: shoulder  Pain Addressed 2: Cessation of Activity, Reposition  Pain Rating Post-Intervention 2: 10/10    Patients cultural, spiritual, Uatsdin conflicts given the current situation: no    Objective:     Communicated with: Nursing prior to session.  Patient found HOB elevated with telemetry, blood pressure cuff, pulse ox (continuous), peripheral IV, bed alarm, gu catheter, Other (comments) (Heel protectors) upon OT entry to room.    General Precautions: Standard, fall, contact  Orthopedic Precautions: N/A  Braces: N/A  Respiratory Status: Room air    Occupational Performance:    Bed Mobility:    Pt refused to participate in bed mobility tasks due to " reported 10/10 pain in bilateral lower extremity and right upper extremity shoulder. Per PT note Pt with dependence in bed mobility at this time. Total assist x2 to scoot Pt to HOB for better positioning for bed level ADL.    Functional Mobility/Transfers:  Unable to perform due to significant pain.    Activities of Daily Living:  Grooming: modified independence at bed level with set up.  Upper Body Dressing: Unable to perform due to Pt refusing to move due to pain.  Lower Body Dressing: Unable to perform due to Pt refusing to move due to pain.  Toileting: dependence Pt with ug catheter.    Cognitive/Visual Perceptual:  Cognitive/Psychosocial Skills:     -       Oriented to: Person, Place, Time, and Situation   -       Follows Commands/attention:Follows multistep  commands  -       Communication: clear/fluent  -       Memory: No Deficits noted    Physical Exam:  Balance:    -       Unable to assess due to Pt refusing bed mobility or EOB sitting due to pain.   Skin integrity: Pt with ulcers on bilateral heels.  Upper Extremity Range of Motion:     -       Right Upper Extremity: Deficits: Minimal active or passive range of motion of shoulder during assessment due to pain. Elbow flexion/extension WNL.  -       Left Upper Extremity: WFL  Upper Extremity Strength:    -       Right Upper Extremity: Deficits: Minimal active shoulder movement in right shoulder due to pain during assessment. 4-/5 strength in elbow flexion/extension.   -       Left Upper Extremity: WFL except 4-/5 in shoulder and elbow.    Strength:    -       Right Upper Extremity: WFL  -       Left Upper Extremity: WFL    AMPAC 6 Click ADL:  AMPAC Total Score: 8    Treatment & Education:  OT evaluation completed. Pt educated on role and POC of OT.    Patient left HOB elevated with all lines intact, call button in reach, bed alarm on, and nursing notified    GOALS:   Multidisciplinary Problems       Occupational Therapy Goals          Problem:  Occupational Therapy    Goal Priority Disciplines Outcome Interventions   Occupational Therapy Goal     OT, PT/OT     Description: Pt to perform UB dressing with MIN A seated EOB.   Pt to consistently demonstrate adherence to orthopedic precautions during all ADL's as instructed by OT.  Pt to demonstrate good dynamic and static seated balance as required to perform ADL's from seated level.  Pt to participate in upper extremity range of motion exercise program as educated by OT for maintenance or shoulder range of motion.  Pt to participate in EOB seated ADL for ~5 minutes for increased safety and activity tolerance upon discharge.                         History:     Past Medical History:   Diagnosis Date    Anemia     Anxiety     Depressed     Diskitis     Hep C w/o coma, chronic     IV drug abuse     Kidney stone     Liver cirrhosis          Past Surgical History:   Procedure Laterality Date    ARTHROTOMY OF SHOULDER Left 11/15/2022    Procedure: ARTHROTOMY, SHOULDER;  Surgeon: Bjorn Hayes MD;  Location: [facility];  Service: Orthopedics;  Laterality: Left;  Left acromioclavicular joint arthrotomy    BACK SURGERY      benine tumor removal      forehead, age 9    CHOLECYSTECTOMY      KIDNEY SURGERY      LUMBAR FUSION Bilateral 3/2/2022    Procedure: FUSION, SPINE, LUMBAR;  Surgeon: Guille Templeton MD;  Location: [address];  Service: Neurosurgery;  Laterality: Bilateral;  AIRO  T10--Pelvis    LUMBAR FUSION N/A 1/24/2023    Procedure: **AIRO** T8-T12 POSTERIOR FUSION, T8-T10 LAMINECTOMY, HARDWARE REMOVAL AND WASHOUT;  Surgeon: Guille Templeton MD;  Location: [address];  Service: Neurosurgery;  Laterality: N/A;    REMOVAL OF HARDWARE FROM SPINE N/A 2/21/2022    Procedure: REMOVAL, HARDWARE, SPINE;  Surgeon: Guille Templeton MD;  Location: [address];  Service: Neurosurgery;  Laterality: N/A;  [place]    SPINE SURGERY      THORACIC LAMINECTOMY WITH FUSION N/A 2/2/2023    Procedure: LAMINECTOMY, SPINE,  THORACIC, WITH FUSION (T4-Pelvis fusion w/ T9-10 corpectomies) **AIRO;  Surgeon: Guille Templeton MD;  Location: [facility] OR [address] [place] [address];  Service: Neurosurgery;  Laterality: N/A;  AIRO, 2 bovies, aquamantys, Globus, Depuy, antibiotic beads, TXA, Peroxide; Babycos plastics closure       Time Tracking:     OT Date of Treatment:    OT Start Time: 1233  OT Stop Time: 1258  OT Total Time (min): 25 min    Billable Minutes:Evaluation 25 7/12/2023

## 2023-07-12 NOTE — ASSESSMENT & PLAN NOTE
Lab Results   Component Value Date    ALBUMIN 1.4 (L) 07/10/2023       Dietician consult-- Continue Srinivasa BID per recs

## 2023-07-12 NOTE — ASSESSMENT & PLAN NOTE
Component of hypoalbuminemia and suspicion for underlying infection  U/A abnormal but unclear if this is source  Blood cultures pending. If negative at 48-72 hours and vitals remain stable I would feel comfortable with discharge   MAP stable in 70s  7/9---Will give her a 500ml bolus as her systolic dipped to 88 this am    1 unit of pRBC, decrease gabapentin     7/11 BP still hypotensive. 500cc bolus ordered.  D/c suboxone and tizanidine.  Decreased gabapentin.  Echo pending.  Cortisol and ACTH wnl.      7/12 Some improvement.  Holding all pain medications except gabapentin.  CT cervical spine due to continue pain.

## 2023-07-12 NOTE — SUBJECTIVE & OBJECTIVE
Past Medical History:   Diagnosis Date    Anemia     Anxiety     Depressed     Diskitis     Hep C w/o coma, chronic     IV drug abuse     Kidney stone     Liver cirrhosis        Past Surgical History:   Procedure Laterality Date    ARTHROTOMY OF SHOULDER Left 11/15/2022    Procedure: ARTHROTOMY, SHOULDER;  Surgeon: Bjorn Hayes MD;  Location: [facility];  Service: Orthopedics;  Laterality: Left;  Left acromioclavicular joint arthrotomy    BACK SURGERY      benine tumor removal      forehead, age 9    CHOLECYSTECTOMY      KIDNEY SURGERY      LUMBAR FUSION Bilateral 3/2/2022    Procedure: FUSION, SPINE, LUMBAR;  Surgeon: Guille Templeton MD;  Location: [address];  Service: Neurosurgery;  Laterality: Bilateral;  AIRO  T10--Pelvis    LUMBAR FUSION N/A 1/24/2023    Procedure: **AIRO** T8-T12 POSTERIOR FUSION, T8-T10 LAMINECTOMY, HARDWARE REMOVAL AND WASHOUT;  Surgeon: Guille Templeton MD;  Location: [address];  Service: Neurosurgery;  Laterality: N/A;    REMOVAL OF HARDWARE FROM SPINE N/A 2/21/2022    Procedure: REMOVAL, HARDWARE, SPINE;  Surgeon: Guille Templeton MD;  Location: [address];  Service: Neurosurgery;  Laterality: N/A;  [place]    SPINE SURGERY      THORACIC LAMINECTOMY WITH FUSION N/A 2/2/2023    Procedure: LAMINECTOMY, SPINE, THORACIC, WITH FUSION (T4-Pelvis fusion w/ T9-10 corpectomies) **AIRO;  Surgeon: Guille Templeton MD;  Location: [address];  Service: Neurosurgery;  Laterality: N/A;  AIRO, 2 bovies, aquamantys, Globus, Depuy, antibiotic beads, TXA, Peroxide; [employer] plastics closure       Review of patient's allergies indicates:   Allergen Reactions    Bee venom protein (honey bee) Anaphylaxis     Patient reports she is allergic to bee stings.    Naproxen Anaphylaxis     Throat closing    12-23- Patient reports taking Ibuprofen 200 mg at home without problems. Verified X3. [place]    Wasp sting [allergen ext-venom-honey bee] Anaphylaxis    Adhesive Blisters    Shellfish containing products      Iodine and iodide containing products Hives and Itching     Allergic to iodine in seafood only    Nuts [tree nut] Rash       No current facility-administered medications on file prior to encounter.     Current Outpatient Medications on File Prior to Encounter   Medication Sig    bumetanide (BUMEX) 1 MG tablet Take 1 tablet (1 mg total) by mouth once daily.    buprenorphine-naloxone 8-2 mg (SUBOXONE) 8-2 mg Place under the tongue 2 (two) times a day.    DULoxetine (CYMBALTA) 60 MG capsule Take 60 mg by mouth once daily.    hydrOXYzine HCL (ATARAX) 25 MG tablet Take 1 tablet (25 mg total) by mouth 3 (three) times daily as needed for Anxiety.    hydrOXYzine pamoate (VISTARIL) 25 MG Cap Take 25 mg by mouth 2 (two) times daily as needed.    lactulose (CHRONULAC) 20 gram/30 mL Soln Take 30 mLs (20 g total) by mouth 3 (three) times daily.    melatonin (MELATIN) 3 mg tablet Take 2 tablets (6 mg total) by mouth nightly as needed for Insomnia.    pantoprazole (PROTONIX) 40 MG tablet Take 1 tablet (40 mg total) by mouth once daily.    spironolactone (ALDACTONE) 100 MG tablet Take 1 tablet (100 mg total) by mouth once daily.    tiZANidine (ZANAFLEX) 4 MG tablet Take 1 tablet (4 mg total) by mouth 3 (three) times daily as needed (Pain/muscle spasms).    acetaminophen (TYLENOL) 500 MG tablet Take 2 tablets (1,000 mg total) by mouth every 12 (twelve) hours. (Patient not taking: Reported on 6/22/2023)    calcium carbonate (TUMS) 200 mg calcium (500 mg) chewable tablet Take 2 tablets (1,000 mg total) by mouth 3 (three) times daily as needed.    folic acid (FOLVITE) 1 MG tablet Take 1 tablet (1 mg total) by mouth once daily. (Patient taking differently: Take 1 mg by mouth daily as needed (vitamin supplement).)    gabapentin (NEURONTIN) 300 MG capsule Take 3 capsules (900 mg total) by mouth 3 (three) times daily.    ondansetron (ZOFRAN) 4 MG tablet Take 8 mg by mouth 2 (two) times daily as needed for Nausea.    oxyCODONE  (ROXICODONE) 5 MG immediate release tablet Take 5 mg by mouth.    polyethylene glycol (GLYCOLAX) 17 gram PwPk Take 17 g by mouth daily as needed. (Patient not taking: Reported on 6/22/2023)    [DISCONTINUED] diclofenac sodium (VOLTAREN) 1 % Gel Apply 2 g topically 4 (four) times daily.     Family History       Problem Relation (Age of Onset)    Cirrhosis Father    Drug abuse Mother, Father    Hepatitis Mother, Father    Liver cancer Mother          Tobacco Use    Smoking status: Some Days     Packs/day: 0.50     Years: 23.00     Pack years: 11.50     Types: Cigarettes    Smokeless tobacco: Never    Tobacco comments:     patient states she knows she nees to quit.   Substance and Sexual Activity    Alcohol use: Not Currently     Comment: quit 2014    Drug use: Not Currently     Frequency: 4.0 times per week     Types: Marijuana     Comment: Patient denies needle use, only inhalation of methampehtamines or orally.  Last known drug use was prior to admission to hospital stay for surgery    Sexual activity: Yes     Partners: Male     Birth control/protection: None     Review of Systems   Constitutional:  Positive for fatigue. Negative for chills and fever.   HENT:  Negative for ear discharge and ear pain.    Eyes:  Negative for pain and discharge.   Respiratory:  Negative for cough and shortness of breath.    Cardiovascular:  Negative for chest pain (mostly associated when other areas in her body are in pain) and leg swelling.   Gastrointestinal:  Negative for nausea and vomiting.   Endocrine: Negative for cold intolerance and heat intolerance.   Genitourinary:  Negative for difficulty urinating and dysuria.   Musculoskeletal:  Positive for arthralgias, gait problem and myalgias.        Right arm pain    Skin:  Positive for wound. Negative for rash.   Neurological:  Negative for dizziness and headaches.   Psychiatric/Behavioral:  Negative for agitation and confusion.    Objective:     Vital Signs (Most Recent):  Temp:  97.1 °F (36.2 °C) (07/12/23 0715)  Pulse: 76 (07/12/23 0715)  Resp: 17 (07/12/23 0715)  BP: (!) 86/51 (07/12/23 0715)  SpO2: 97 % (07/12/23 0715) Vital Signs (24h Range):  Temp:  [96.2 °F (35.7 °C)-98.3 °F (36.8 °C)] 97.1 °F (36.2 °C)  Pulse:  [67-84] 76  Resp:  [9-33] 17  SpO2:  [95 %-100 %] 97 %  BP: ()/(43-61) 86/51     Weight: 118.9 kg (262 lb 2 oz)  Body mass index is 41.05 kg/m².     Physical Exam  Constitutional:       Appearance: Normal appearance.      Comments: Continued generalized pain     HENT:      Head: Normocephalic and atraumatic.   Cardiovascular:      Rate and Rhythm: Normal rate and regular rhythm.   Pulmonary:      Effort: Pulmonary effort is normal. No respiratory distress.   Abdominal:      General: Bowel sounds are normal. There is no distension.      Palpations: Abdomen is soft.      Tenderness: There is no abdominal tenderness.   Musculoskeletal:         General: Tenderness present.      Right lower leg: No edema.      Left lower leg: No edema.      Comments: RUE tenderness and decreased ROM  She will not cooperate to do a good shoulder exam due to pain   ROM improving today    Skin:     General: Skin is warm and dry.      Comments: Multiple wounds on  bilateral heels and sacral region    Neurological:      Mental Status: She is alert and oriented to person, place, and time. Mental status is at baseline.   Psychiatric:         Mood and Affect: Mood normal.         Behavior: Behavior normal.              Significant Labs: A1C: No results for input(s): HGBA1C in the last 4320 hours.  ABGs: No results for input(s): PH, PCO2, HCO3, POCSATURATED, BE, TOTALHB, COHB, METHB, O2HB, POCFIO2, PO2 in the last 48 hours.  Bilirubin:   Recent Labs   Lab 06/16/23  0519 07/06/23  2249 07/07/23  1133 07/08/23  0604 07/10/23  0559   BILITOT 2.4* 3.1* 3.2* 3.0* 2.4*       Blood Culture:   No results for input(s): LABBLOO in the last 48 hours.    BMP:   Recent Labs   Lab 07/11/23  0609      *    K 4.9      CO2 24   BUN 16   CREATININE 0.5   CALCIUM 7.5*       CBC:   Recent Labs   Lab 07/11/23  0609   WBC 2.75*   HGB 8.1*   HCT 26.8*   PLT 86*       CMP:   Recent Labs   Lab 07/11/23  0609   *   K 4.9      CO2 24      BUN 16   CREATININE 0.5   CALCIUM 7.5*   ANIONGAP 4*       Cardiac Markers:   No results for input(s): CKMB, MYOGLOBIN, BNP, TROPISTAT in the last 48 hours.    Coagulation:   No results for input(s): PT, INR, APTT in the last 48 hours.    Lactic Acid:   Recent Labs   Lab 07/11/23  1530   LACTATE 0.9       Lipase: No results for input(s): LIPASE in the last 48 hours.  Lipid Panel: No results for input(s): CHOL, HDL, LDLCALC, TRIG, CHOLHDL in the last 48 hours.  Magnesium:   No results for input(s): MG in the last 48 hours.    POCT Glucose: No results for input(s): POCTGLUCOSE in the last 48 hours.  Prealbumin: No results for input(s): PREALBUMIN in the last 48 hours.  Respiratory Culture: No results for input(s): GSRESP, RESPIRATORYC in the last 48 hours.  Troponin:   Recent Labs   Lab 07/11/23  1530   TROPONINI 0.007       TSH: No results for input(s): TSH in the last 4320 hours.  Urine Culture: No results for input(s): LABURIN in the last 48 hours.  Urine Studies:   Recent Labs   Lab 07/10/23  1840   COLORU Yellow   APPEARANCEUA Clear   PHUR 7.0   SPECGRAV 1.010   PROTEINUA Negative   GLUCUA Negative   KETONESU Negative   BILIRUBINUA Negative   OCCULTUA Trace*   NITRITE Negative   UROBILINOGEN 2.0-3.0*   LEUKOCYTESUR Trace*   RBCUA 1   WBCUA 1   BACTERIA Rare         Significant Imaging: I have reviewed all pertinent imaging results/findings within the past 24 hours.

## 2023-07-12 NOTE — H&P
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 treatment. The patient expresses understanding of the above and displays the capacity to agree with this treatment given said understanding. Patient also agrees that, currently, the benefits outweigh the risks and would like to pursue/continue treatment at this time.    Any medications being used off-label were discussed with the patient inclusive of the evidence base for the use of the medications and consent was obtained for the off-label use of the medication.         DIAGNOSTIC TESTING  Labs reviewed with patient; follow up pending labs    Disposition:  -Will attempt to obtain outside psychiatric records if available  -SW to assist with aftercare planning and collateral  -Once stable discharge home with outpatient follow up care and/or rehab        Piter Thrasher III, MD  Psychiatry
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
## 2023-07-12 NOTE — PT/OT/SLP PROGRESS
Physical Therapy Treatment    Patient Name:  Rachel Zazueta   MRN:  [id number]    Recommendations:     Discharge Recommendations: LTACH (long-term acute care hospital), nursing facility, basic, nursing facility, skilled  Discharge Equipment Recommendations: none  Barriers to discharge: Inaccessible home and Decreased caregiver support    Assessment:     Rachel Zazueta is a 42 y.o. female admitted with a medical diagnosis of <principal problem not specified>.  She presents with the following impairments/functional limitations: weakness, impaired endurance, impaired self care skills, impaired functional mobility, gait instability, impaired balance, decreased safety awareness, pain, decreased ROM, impaired skin, impaired joint extensibility, impaired muscle length, decreased upper extremity function, decreased lower extremity function. Patient still crying with pain upon LE/UE ROM ex. Declined to perform rolling activity .    Rehab Prognosis: Fair; patient would benefit from acute skilled PT services to address these deficits and reach maximum level of function.    Recent Surgery: * No surgery found *      Plan:     During this hospitalization, patient to be seen 5 x/week to address the identified rehab impairments via therapeutic activities, therapeutic exercises and progress toward the following goals:    Plan of Care Expires:  07/14/23    Subjective     Chief Complaint: pain on bilateral leg/feet and on right shoulder adriana upon movement  Patient/Family Comments/goals: none new stated  Pain/Comfort:  Pain Rating 1: 10/10  Location - Side 1: Bilateral  Location - Orientation 1: generalized  Location 1: other (see comments) (legs, feet and right shoulder)  Pain Addressed 1: Reposition  Pain Rating Post-Intervention 1: 10/10      Objective:     Communicated with nursing and patient prior to session.  Patient found HOB elevated with blood pressure cuff, gu catheter, peripheral IV, telemetry, Other (comments)  (bilateral heel protector) upon PT entry to room.     General Precautions: Standard, contact, fall  Orthopedic Precautions: N/A  Braces: N/A  Respiratory Status: Room air     Functional Mobility:  AAROM - AROM ex on B LE/UE x 5 - 10 reps on each possible plane      AM-PAC 6 CLICK MOBILITY  Turning over in bed (including adjusting bedclothes, sheets and blankets)?: 2  Sitting down on and standing up from a chair with arms (e.g., wheelchair, bedside commode, etc.): 1  Moving from lying on back to sitting on the side of the bed?: 1  Moving to and from a bed to a chair (including a wheelchair)?: 1  Need to walk in hospital room?: 1  Climbing 3-5 steps with a railing?: 1  Basic Mobility Total Score: 7       Treatment & Education:  Provided AAROM - AROM ex on B LE/UE x 5 - 10 reps on each possible plane    Patient left HOB elevated with all lines intact, call button in reach, and nursing present..    GOALS:   Multidisciplinary Problems       Physical Therapy Goals          Problem: Physical Therapy    Goal Priority Disciplines Outcome Goal Variances Interventions   Physical Therapy Goal     PT, PT/OT      Description:   Patient will increase functional independence with mobility by performin. Pt able to perform and tolerate B LE ROM ex x 10 reps on each plane to inc mobility, inc body repositioning  and prevent in declining in functions.  2. Inc rolling to sides to minimal assistance and cues  3. Able to perform and tolerate  supine <>sit with Moderate Assistance and cues.  4. Able to perform and tolerate static sit at edge of the bed x ~10 minutes for inc participation and performance with self care activities.  5. Establish home ex program.                          Time Tracking:     PT Received On: 23  PT Start Time: 910     PT Stop Time: 920  PT Total Time (min): 10 min     Billable Minutes: Therapeutic Exercise 10    Treatment Type: Treatment  PT/PTA: PT           2023

## 2023-07-13 ENCOUNTER — ANESTHESIA (OUTPATIENT)
Dept: INTENSIVE CARE | Facility: HOSPITAL | Age: 43
End: 2023-07-13

## 2023-07-13 ENCOUNTER — ANESTHESIA EVENT (OUTPATIENT)
Dept: INTENSIVE CARE | Facility: HOSPITAL | Age: 43
End: 2023-07-13

## 2023-07-13 PROBLEM — T74.01XA ADULT NEGLECT: Status: ACTIVE | Noted: 2023-07-13

## 2023-07-13 PROBLEM — S91.302A NON HEALING LEFT HEEL WOUND: Status: ACTIVE | Noted: 2023-07-13

## 2023-07-13 PROBLEM — F32.9 MAJOR DEPRESSIVE DISORDER: Status: ACTIVE | Noted: 2023-07-13

## 2023-07-13 PROCEDURE — 63600175 PHARM REV CODE 636 W HCPCS: Performed by: SURGERY

## 2023-07-13 PROCEDURE — 25000003 PHARM REV CODE 250: Performed by: PHYSICIAN ASSISTANT

## 2023-07-13 PROCEDURE — 99900031 HC PATIENT EDUCATION (STAT)

## 2023-07-13 PROCEDURE — 11000001 HC ACUTE MED/SURG PRIVATE ROOM

## 2023-07-13 PROCEDURE — 94761 N-INVAS EAR/PLS OXIMETRY MLT: CPT

## 2023-07-13 PROCEDURE — 99291 PR CRITICAL CARE, E/M 30-74 MINUTES: ICD-10-PCS | Mod: ,,, | Performed by: PHYSICIAN ASSISTANT

## 2023-07-13 PROCEDURE — 25000003 PHARM REV CODE 250: Performed by: SURGERY

## 2023-07-13 PROCEDURE — 97110 THERAPEUTIC EXERCISES: CPT

## 2023-07-13 PROCEDURE — 99900035 HC TECH TIME PER 15 MIN (STAT)

## 2023-07-13 PROCEDURE — 27000207 HC ISOLATION

## 2023-07-13 PROCEDURE — 97535 SELF CARE MNGMENT TRAINING: CPT | Mod: CO

## 2023-07-13 PROCEDURE — 25000003 PHARM REV CODE 250: Performed by: FAMILY MEDICINE

## 2023-07-13 PROCEDURE — 99291 CRITICAL CARE FIRST HOUR: CPT | Mod: ,,, | Performed by: PHYSICIAN ASSISTANT

## 2023-07-13 RX ORDER — MIDODRINE HYDROCHLORIDE 2.5 MG/1
5 TABLET ORAL 3 TIMES DAILY
Status: DISCONTINUED | OUTPATIENT
Start: 2023-07-13 | End: 2023-07-21 | Stop reason: HOSPADM

## 2023-07-13 RX ADMIN — COLLAGENASE SANTYL: 250 OINTMENT TOPICAL at 08:07

## 2023-07-13 RX ADMIN — GABAPENTIN 100 MG: 100 CAPSULE ORAL at 04:07

## 2023-07-13 RX ADMIN — PANTOPRAZOLE SODIUM 40 MG: 40 TABLET, DELAYED RELEASE ORAL at 08:07

## 2023-07-13 RX ADMIN — MIDODRINE HYDROCHLORIDE 5 MG: 2.5 TABLET ORAL at 08:07

## 2023-07-13 RX ADMIN — MIDODRINE HYDROCHLORIDE 5 MG: 2.5 TABLET ORAL at 09:07

## 2023-07-13 RX ADMIN — ONDANSETRON HYDROCHLORIDE 4 MG: 2 SOLUTION INTRAMUSCULAR; INTRAVENOUS at 06:07

## 2023-07-13 RX ADMIN — ONDANSETRON HYDROCHLORIDE 4 MG: 2 SOLUTION INTRAMUSCULAR; INTRAVENOUS at 10:07

## 2023-07-13 RX ADMIN — ACETAMINOPHEN 500 MG: 500 TABLET ORAL at 03:07

## 2023-07-13 RX ADMIN — GABAPENTIN 100 MG: 100 CAPSULE ORAL at 08:07

## 2023-07-13 RX ADMIN — SODIUM CHLORIDE: 9 INJECTION, SOLUTION INTRAVENOUS at 11:07

## 2023-07-13 RX ADMIN — MIDODRINE HYDROCHLORIDE 5 MG: 2.5 TABLET ORAL at 04:07

## 2023-07-13 RX ADMIN — GABAPENTIN 100 MG: 100 CAPSULE ORAL at 09:07

## 2023-07-13 NOTE — NURSING
The patient remained stable throughout the night.  Remains with no I.v. access. Will continue to monitor.

## 2023-07-13 NOTE — PLAN OF CARE
07/13/23 0853   Post-Acute Status   Post-Acute Authorization Placement   Discharge Delays (!) Post-Acute Set-up         Patient remains awaiting placement and continued medical care. Letter of Consideration received from the Office of [facility]ging [facility] Adult Services confirming the need for a Level II Screen by the Office of Behavioral Health. Psych eval received and faxed to [facility] along with other requested documentation. Awaiting 142.     [id number]--SW contacted by [facility] to inform of a representative presenting to the hospital today to complete patient evaluation for nursing home placement. Nursing informed.    Referral done  Flavio Cooper M.D.

## 2023-07-13 NOTE — PT/OT/SLP PROGRESS
"Physical Therapy Treatment    Patient Name:  Rachel Zazueta   MRN:  [id number]    Recommendations:     Discharge Recommendations: LTACH (long-term acute care hospital), nursing facility, basic, nursing facility, skilled  Discharge Equipment Recommendations: none  Barriers to discharge: Inaccessible home and Decreased caregiver support    Assessment:     Rachel Zazueta is a 42 y.o. female admitted with a medical diagnosis of <principal problem not specified>.  She presents with the following impairments/functional limitations: weakness, impaired endurance, impaired self care skills, impaired functional mobility, gait instability, impaired balance, impaired skin, pain, impaired joint extensibility, impaired muscle length, decreased ROM. Observed patient from the outside texting on her mobile phone. Upon P Therapist entering the room, patient started to cry and stating not feeling good. Aware patient just had Occupational Therapy tx earlier. Convinced patient to perform  B LE ROM ex actively with limitations and declined to be assisted with ROM ex due to crying with pain on bilateral legs adriana on the right side. Also patient declined to reposition head of  the bed due to discomfort. Nursing made aware.    Rehab Prognosis: Fair; patient would benefit from acute skilled PT services to address these deficits and reach maximum level of function.    Recent Surgery: * No surgery found *      Plan:     During this hospitalization, patient to be seen 5 x/week to address the identified rehab impairments via therapeutic activities, therapeutic exercises and progress toward the following goals:    Plan of Care Expires:  07/14/23    Subjective     Chief Complaint: Pt stated. "I'll try to do better next time. I'm sorry.  Patient/Family Comments/goals: none stated  Pain/Comfort:  Pain Rating 1: 10/10  Location - Side 1: Bilateral  Location - Orientation 1: lower  Location 1: leg  Pain Addressed 1: Reposition, Cessation of " Activity  Pain Rating Post-Intervention 1: 10/10      Objective:     Communicated with nursing and patient prior to session.  Patient found supine with blood pressure cuff, gu catheter, peripheral IV, telemetry, pulse ox (continuous), Other (comments) (B heel protector) upon PT entry to room.     General Precautions: Standard, fall, contact  Orthopedic Precautions: N/A  Braces: N/A  Respiratory Status: Room air     Functional Mobility:  Active ROM ex on B LE x 5 reps on each possible plane and upon tolerance.      AM-PAC 6 CLICK MOBILITY          Treatment & Education:  Performed Active ROM ex on B LE x 5 reps on each possible plane and upon tolerance.    Patient left HOB elevated with all lines intact, call button in reach, and nursing notified..    GOALS:   Multidisciplinary Problems       Physical Therapy Goals          Problem: Physical Therapy    Goal Priority Disciplines Outcome Goal Variances Interventions   Physical Therapy Goal     PT, PT/OT      Description:   Patient will increase functional independence with mobility by performin. Pt able to perform and tolerate B LE ROM ex x 10 reps on each plane to inc mobility, inc body repositioning  and prevent in declining in functions.  2. Inc rolling to sides to minimal assistance and cues  3. Able to perform and tolerate  supine <>sit with Moderate Assistance and cues.  4. Able to perform and tolerate static sit at edge of the bed x ~10 minutes for inc participation and performance with self care activities.  5. Establish home ex program.                          Time Tracking:     PT Received On: 23  PT Start Time: 845     PT Stop Time: 0855  PT Total Time (min): 10 min     Billable Minutes: Therapeutic Exercise 10    Treatment Type: Treatment  PT/PTA: PT           2023

## 2023-07-13 NOTE — ASSESSMENT & PLAN NOTE
Component of hypoalbuminemia and suspicion for underlying infection  U/A abnormal but unclear if this is source  Blood cultures pending. If negative at 48-72 hours and vitals remain stable I would feel comfortable with discharge   MAP stable in 70s  7/9---Will give her a 500ml bolus as her systolic dipped to 88 this am    1 unit of pRBC, decrease gabapentin     7/11 BP still hypotensive. 500cc bolus ordered.  D/c suboxone and tizanidine.  Decreased gabapentin.  Echo pending.  Cortisol and ACTH wnl.      7/12 Some improvement.  Holding all pain medications except gabapentin.     7/13 MRI c spine limited due to motion but no obvious signs of infection or abscess.  Started midodrine yesterday.  Will increase.  Patient has poor clinical prognosis.

## 2023-07-13 NOTE — PT/OT/SLP PROGRESS
"Occupational Therapy   Treatment    Name: Rachel Zazueta  MRN: [id number]  Admitting Diagnosis:  <principal problem not specified>       Recommendations:     Discharge Recommendations: LTACH (long-term acute care hospital), nursing facility, skilled, nursing facility, basic  Discharge Equipment Recommendations:  none  Barriers to discharge:  Inaccessible home environment, Decreased caregiver support    Assessment:     Rachel Zazueta is a 42 y.o. female with a medical diagnosis of <principal problem not specified>.  Performance deficits affecting function are weakness, impaired endurance, impaired self care skills, impaired functional mobility, gait instability, impaired balance, decreased upper extremity function, decreased lower extremity function, decreased safety awareness, pain, decreased ROM, impaired skin.     Rehab Prognosis:  Poor; patient would benefit from acute skilled OT services to address these deficits and reach maximum level of function.       Plan:     Patient to be seen 5 x/week to address the above listed problems via self-care/home management, therapeutic activities, therapeutic exercises  Plan of Care Expires:    Plan of Care Reviewed with: patient    Subjective     Chief Complaint: "I hurt all over"  Patient/Family Comments/goals: "To get better"  Pain/Comfort:  Pain Rating 1: other (see comments) (Did not rate)  Location - Side 1: Bilateral  Location - Orientation 1: lower  Location 1: leg  Pain Addressed 1: Reposition, Cessation of Activity, Distraction  Pain Rating Post-Intervention 1: other (see comments) (Did not rate)    Objective:     Communicated with: RN prior to session.  Patient found left sidelying with telemetry, blood pressure cuff, pulse ox (continuous), bed alarm, gu catheter, Other (comments) (Heel protectors) upon OT entry to room.    General Precautions: Standard, fall, contact    Orthopedic Precautions:N/A  Braces: N/A  Respiratory Status: Room air     Occupational " Performance:     Bed Mobility:    Pt declined    Functional Mobility/Transfers:  Pt did not perform    Activities of Daily Living:  Feeding:  moderate assistance for positioning      [facility] 6 Click ADL: 7    Treatment & Education:  Pt was lying in bed on her L side upon entering room. Pt was pleasant but stated that she was hurting all over. Pt washed face with wet bath cloth independently. Attempt was made to elevate the HOB for breakfast but patient said she was in too much pain and HOB had to be brought back down. Pt required Mod A with feeding due to lying in bed with HOB elevated only slightly. Pt stated that she was used to eating lying flat and placed the plate/tray on her chest. Pt required assistance with opening items to keep from spilling them due to positioning. Pt was able to feed self but had mild food spillage due to position she was in. Pt declined to roll onto her back to try and change positions to reduce pain. Pt was encouraged to start trying to sit up by elevating the HOB a little through out the day. Pt v/u but needs encouragement.    Patient left left sidelying with all lines intact, call button in reach, bed alarm on, RN notified, and phlebotomist present    GOALS:   Multidisciplinary Problems       Occupational Therapy Goals          Problem: Occupational Therapy    Goal Priority Disciplines Outcome Interventions   Occupational Therapy Goal     OT, PT/OT     Description: Pt to perform UB dressing with MIN A seated EOB.   Pt to consistently demonstrate adherence to orthopedic precautions during all ADL's as instructed by OT.  Pt to demonstrate good dynamic and static seated balance as required to perform ADL's from seated level.  Pt to participate in upper extremity range of motion exercise program as educated by OT for maintenance or shoulder range of motion.  Pt to participate in EOB seated ADL for ~5 minutes for increased safety and activity tolerance upon discharge.                          Time Tracking:     OT Date of Treatment: 07/13/23  OT Start Time: 0806  OT Stop Time: 0826  OT Total Time (min): 20 min    Billable Minutes:Self Care/Home Management 20    OT/ROSS: ROSS     Number of ROSS visits since last OT visit: 1    7/13/2023

## 2023-07-13 NOTE — PROGRESS NOTES
[facility] Medicine  Progress Note    Patient Name: Rachel Zazueta  MRN: [id number]  Patient Class: IP- Inpatient   Admission Date: 7/6/2023  Length of Stay: 6 days  Attending Physician: Brad Zazueta MD  Primary Care Provider: Dannielle Merino DO        Subjective:     Principal Problem:<principal problem not specified>        HPI:  Patient is a 42 year old female with medical history of anxiety, depression, substance use disorder (+ meth on UDS), hepatitis C with liver cirrhosis and TIPS procedure, spinal fusions and discits who presented to the ED with right arm pain.  Found to be hypotensive.  She has had dysuria but denies nausea, vomiting and abdominal pain.  Right arm pain started when she fell out of her wheel chair one week ago.  She is having difficulty moving it.  Pain is in elbow and shoulder.  She has intermittent chest pain that occurs mostly when she is pain elsewhere.  She has been unable to ambulate after discitis and has sores on her heels.          Admitted for Right arm pain and hypotension.        Overview/Hospital Course:  BP is stable, no MAP less than 65, most readings in the 70s   Afebrile   Blood Cx NGTD   Xrays with significant DJD C3/4/5  Shoulder xray with high riding humeral head suggesting a supraspinatous tear, but she is in too much pain to examine. Will likely need an MRI as outpt.    7/9  MAP is 65-84  UOP 36cc/hr  Remains Afebrile   Blood Cx NGTD     PT blood pressure on lower side.  Will decrease gabapentin.  H/H is low and 1 unit of pRBC ordered.  Risks and benefits discussed with patient.  Low suspicion for infection and IV meropenem discontinued.  I believe hypotension is due to volume depletion due to hypoalbuminemia and anemia of chronic disease.  Goal map greater than 65.      PT with continued bp on lower side. 500cc bolus ordered.  Suboxone and tizanidine d/c.  Decreased gabapentin.   H/H improved with 1 unit blood transfusion.  Will  continue to monitor.  Suspect bp on lower side due to liver disease but goal would be to maintain MAP greater than 65.  No obvious signs of infection.  Psych consulted for NH placement.      7/12 PT HD with map of 70.  BP improved with IV fluids.  Pt states she has full body pain but pain medications making her hypotensive.  Will only use tylenol and gabapentin.      7/13 Pt with hypoalbuminemia and hypotension.  Poor clinical prognosis.   Continue to discuss goals of care with patient.  Attempted to add midodrine however patient has not responded.  Will increase midodrine today.        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9    CHOLECYSTECTOMY      KIDNEY SURGERY      LUMBAR FUSION Bilateral 3/2/2022    Procedure: FUSION, SPINE, LUMBAR;  Surgeon: Guille Templeton MD;  Location: [address];  Service: Neurosurgery;  Laterality: Bilateral;  AIRO  T10--Pelvis    LUMBAR FUSION N/A 1/24/2023    Procedure: **AIRO** T8-T12 POSTERIOR FUSION, T8-T10 LAMINECTOMY, HARDWARE REMOVAL AND WASHOUT;  Surgeon: Guille Templeton MD;  Location: [address];  Service: Neurosurgery;  Laterality: N/A;    REMOVAL OF HARDWARE FROM SPINE N/A 2/21/2022    Procedure: REMOVAL, HARDWARE, SPINE;  Surgeon: Guille Templeton MD;  Location: [address];  Service: Neurosurgery;  Laterality: N/A;  [place]    SPINE SURGERY      THORACIC LAMINECTOMY WITH FUSION N/A 2/2/2023    Procedure: LAMINECTOMY, SPINE, THORACIC, WITH FUSION (T4-Pelvis fusion w/ T9-10 corpectomies) **AIRO;   Surgeon: Guille Templeton MD;  Location: [facility] OR [address] [place] [address];  Service: Neurosurgery;  Laterality: N/A;  AIRO, 2 bovies, aquamantys, Globus, Depuy, antibiotic beads, TXA, Peroxide; [employer] plastics closure       Review of patient's allergies indicates:   Allergen Reactions    Bee venom protein (honey bee) Anaphylaxis     Patient reports she is allergic to bee stings.    Naproxen Anaphylaxis     Throat closing    12-23- Patient reports taking Ibuprofen 200 mg at home without problems. Verified X3. [place]    Wasp sting [allergen ext-venom-honey bee] Anaphylaxis    Adhesive Blisters    Shellfish containing products     Iodine and iodide containing products Hives and Itching     Allergic to iodine in seafood only    Nuts [tree nut] Rash       No current facility-administered medications on file prior to encounter.     Current Outpatient Medications on File Prior to Encounter   Medication Sig    bumetanide (BUMEX) 1 MG tablet Take 1 tablet (1 mg total) by mouth once daily.    buprenorphine-naloxone 8-2 mg (SUBOXONE) 8-2 mg Place under the tongue 2 (two) times a day.    DULoxetine (CYMBALTA) 60 MG capsule Take 60 mg by mouth once daily.    hydrOXYzine HCL (ATARAX) 25 MG tablet Take 1 tablet (25 mg total) by mouth 3 (three) times daily as needed for Anxiety.    hydrOXYzine pamoate (VISTARIL) 25 MG Cap Take 25 mg by mouth 2 (two) times daily as needed.    lactulose (CHRONULAC) 20 gram/30 mL Soln Take 30 mLs (20 g total) by mouth 3 (three) times daily.    melatonin (MELATIN) 3 mg tablet Take 2 tablets (6 mg total) by mouth nightly as needed for Insomnia.    pantoprazole (PROTONIX) 40 MG tablet Take 1 tablet (40 mg total) by mouth once daily.    spironolactone (ALDACTONE) 100 MG tablet Take 1 tablet (100 mg total) by mouth once daily.    tiZANidine (ZANAFLEX) 4 MG tablet Take 1 tablet (4 mg total) by mouth 3 (three) times daily as needed (Pain/muscle spasms).    acetaminophen (TYLENOL) 500 MG tablet Take 2 tablets  (1,000 mg total) by mouth every 12 (twelve) hours. (Patient not taking: Reported on 6/22/2023)    calcium carbonate (TUMS) 200 mg calcium (500 mg) chewable tablet Take 2 tablets (1,000 mg total) by mouth 3 (three) times daily as needed.    folic acid (FOLVITE) 1 MG tablet Take 1 tablet (1 mg total) by mouth once daily. (Patient taking differently: Take 1 mg by mouth daily as needed (vitamin supplement).)    gabapentin (NEURONTIN) 300 MG capsule Take 3 capsules (900 mg total) by mouth 3 (three) times daily.    ondansetron (ZOFRAN) 4 MG tablet Take 8 mg by mouth 2 (two) times daily as needed for Nausea.    oxyCODONE (ROXICODONE) 5 MG immediate release tablet Take 5 mg by mouth.    polyethylene glycol (GLYCOLAX) 17 gram PwPk Take 17 g by mouth daily as needed. (Patient not taking: Reported on 6/22/2023)    [DISCONTINUED] diclofenac sodium (VOLTAREN) 1 % Gel Apply 2 g topically 4 (four) times daily.     Family History       Problem Relation (Age of Onset)    Cirrhosis Father    Drug abuse Mother, Father    Hepatitis Mother, Father    Liver cancer Mother          Tobacco Use    Smoking status: Some Days     Packs/day: 0.50     Years: 23.00     Pack years: 11.50     Types: Cigarettes    Smokeless tobacco: Never    Tobacco comments:     patient states she knows she nees to quit.   Substance and Sexual Activity    Alcohol use: Not Currently     Comment: quit 2014    Drug use: Not Currently     Frequency: 4.0 times per week     Types: Marijuana     Comment: Patient denies needle use, only inhalation of methampehtamines or orally.  Last known drug use was prior to admission to hospital stay for surgery    Sexual activity: Yes     Partners: Male     Birth control/protection: None     Review of Systems   Constitutional:  Positive for fatigue. Negative for chills and fever.   HENT:  Negative for ear discharge and ear pain.    Eyes:  Negative for pain and discharge.   Respiratory:  Negative for cough and shortness of  breath.    Cardiovascular:  Negative for chest pain (mostly associated when other areas in her body are in pain) and leg swelling.   Gastrointestinal:  Negative for nausea and vomiting.   Endocrine: Negative for cold intolerance and heat intolerance.   Genitourinary:  Negative for difficulty urinating and dysuria.   Musculoskeletal:  Positive for arthralgias, gait problem and myalgias.        Right arm pain    Skin:  Positive for wound. Negative for rash.   Neurological:  Negative for dizziness and headaches.   Psychiatric/Behavioral:  Negative for agitation and confusion.    Objective:     Vital Signs (Most Recent):  Temp: 97.5 °F (36.4 °C) (07/13/23 0730)  Pulse: 80 (07/13/23 0600)  Resp: 12 (07/13/23 0600)  BP: (!) 92/52 (07/13/23 0600)  SpO2: 97 % (07/13/23 0654) Vital Signs (24h Range):  Temp:  [97.1 °F (36.2 °C)-98 °F (36.7 °C)] 97.5 °F (36.4 °C)  Pulse:  [77-89] 80  Resp:  [12-31] 12  SpO2:  [96 %-100 %] 97 %  BP: ()/(48-61) 92/52     Weight: 118.9 kg (262 lb 2 oz)  Body mass index is 41.05 kg/m².     Physical Exam  Constitutional:       Appearance: Normal appearance.      Comments: Continued generalized pain     HENT:      Head: Normocephalic and atraumatic.   Cardiovascular:      Rate and Rhythm: Normal rate and regular rhythm.   Pulmonary:      Effort: Pulmonary effort is normal. No respiratory distress.   Abdominal:      General: Bowel sounds are normal. There is no distension.      Palpations: Abdomen is soft.      Tenderness: There is no abdominal tenderness.   Musculoskeletal:         General: Tenderness present.      Right lower leg: No edema.      Left lower leg: No edema.      Comments: RUE tenderness and decreased ROM  She will not cooperate to do a good shoulder exam due to pain   ROM improving today    Skin:     General: Skin is warm and dry.      Comments: Multiple wounds on  bilateral heels and sacral region    Neurological:      Mental Status: She is alert and oriented to person, place,  and time. Mental status is at baseline.   Psychiatric:         Mood and Affect: Mood normal.         Behavior: Behavior normal.              Significant Labs: A1C: No results for input(s): HGBA1C in the last 4320 hours.  ABGs: No results for input(s): PH, PCO2, HCO3, POCSATURATED, BE, TOTALHB, COHB, METHB, O2HB, POCFIO2, PO2 in the last 48 hours.  Bilirubin:   Recent Labs   Lab 06/16/23  0519 07/06/23  2249 07/07/23  1133 07/08/23  0604 07/10/23  0559   BILITOT 2.4* 3.1* 3.2* 3.0* 2.4*       Blood Culture:   No results for input(s): LABBLOO in the last 48 hours.    BMP:   Recent Labs   Lab 07/12/23  0859   GLU 73   *   K 4.9      CO2 24   BUN 16   CREATININE 0.5   CALCIUM 7.5*       CBC:   Recent Labs   Lab 07/12/23  0859   WBC 2.16*   HGB 7.7*   HCT 25.8*   PLT 83*       CMP:   Recent Labs   Lab 07/12/23  0859   *   K 4.9      CO2 24   GLU 73   BUN 16   CREATININE 0.5   CALCIUM 7.5*   ANIONGAP 3*       Cardiac Markers:   No results for input(s): CKMB, MYOGLOBIN, BNP, TROPISTAT in the last 48 hours.    Coagulation:   No results for input(s): PT, INR, APTT in the last 48 hours.    Lactic Acid:   Recent Labs   Lab 07/11/23  1530   LACTATE 0.9       Lipase: No results for input(s): LIPASE in the last 48 hours.  Lipid Panel: No results for input(s): CHOL, HDL, LDLCALC, TRIG, CHOLHDL in the last 48 hours.  Magnesium:   No results for input(s): MG in the last 48 hours.    POCT Glucose: No results for input(s): POCTGLUCOSE in the last 48 hours.  Prealbumin: No results for input(s): PREALBUMIN in the last 48 hours.  Respiratory Culture: No results for input(s): GSRESP, RESPIRATORYC in the last 48 hours.  Troponin:   Recent Labs   Lab 07/11/23  1530   TROPONINI 0.007       TSH: No results for input(s): TSH in the last 4320 hours.  Urine Culture: No results for input(s): LABURIN in the last 48 hours.  Urine Studies:   No results for input(s): COLORU, APPEARANCEUA, PHUR, SPECGRAV, PROTEINUA, GLUCUA,  KETONESU, BILIRUBINUA, OCCULTUA, NITRITE, UROBILINOGEN, LEUKOCYTESUR, RBCUA, WBCUA, BACTERIA, SQUAMEPITHEL, HYALINECASTS in the last 48 hours.    Invalid input(s): TREY      Significant Imaging: I have reviewed all pertinent imaging results/findings within the past 24 hours.      Assessment/Plan:      Major depressive disorder  D/c cymbalta due to psych recommendations and start zoloft in 3-4 days.        Hypoalbuminemia  Lab Results   Component Value Date    ALBUMIN 1.4 (L) 07/10/2023       Dietician consult-- Continue Srinivasa BID per recs     Wounds, multiple  Bilateral heels with escharSacral region    General surgery consulted---santyl ordered. Will likely need to be debrided at some point       Right arm pain  Right arm pain and decreased ROM  XRAY cspine with DJD C3/4/5. This could cause some radicular pain radiating into the shoulder... she is on gabapentin  Her xray of shoulder shows that she has a high riding humeral head, concerning for a supraspinatus tear...she can address this as an outpt with an MRI      Gabapentin.        Hypotension  Component of hypoalbuminemia and suspicion for underlying infection  U/A abnormal but unclear if this is source  Blood cultures pending. If negative at 48-72 hours and vitals remain stable I would feel comfortable with discharge   MAP stable in 70s  7/9---Will give her a 500ml bolus as her systolic dipped to 88 this am    1 unit of pRBC, decrease gabapentin     7/11 BP still hypotensive. 500cc bolus ordered.  D/c suboxone and tizanidine.  Decreased gabapentin.  Echo pending.  Cortisol and ACTH wnl.      7/12 Some improvement.  Holding all pain medications except gabapentin.     7/13 MRI c spine limited due to motion but no obvious signs of infection or abscess.  Started midodrine yesterday.  Will increase.  Patient has poor clinical prognosis.      Weakness of both lower extremities  After discitis   Unable to ambulate       Debility  Chronic debility  Patient unable  to care for herself  CM working placement to [facility] but denied.  Now attempting NH placement  PT is seeing her       Substance use disorder  + methamphetamine use on UDS  Cessation resources from  at discharge   D/c suboxone for hypotension  Psych consulted for substance use disorder      Cirrhosis of liver with ascites  Monitor AST and ALT  Ok to give her tylenol for pain, as long as she is getting less than 2g/day total         Abnormal urinalysis  + nitrites on U/A; WB on microscopic mildly elevated and only trace amount of leukocytes  Will tx due to symptomatic  H/o ESBL klebsiella Pneumoniae  D/c IV meropenem.  Low suspicion for UTI.  U/A did not reflex.          VTE Risk Mitigation (From admission, onward)         Ordered     IP VTE HIGH RISK PATIENT  Once         07/07/23 0107     Place sequential compression device  Until discontinued         07/07/23 0107                Discharge Planning   SHIRA:      Code Status: Full Code   Is the patient medically ready for discharge?:     Reason for patient still in hospital (select all that apply): Patient trending condition  Discharge Plan A: Long-term acute care facility (LTAC)   Discharge Delays: None known at this time        Critical care time spent on the evaluation and treatment of severe organ dysfunction, review of pertinent labs and imaging studies, discussions with consulting providers and discussions with patient/family: 30 minutes.      Leyla Blair PA-C  Department of Hospital Medicine   [place] - Intensive Care

## 2023-07-13 NOTE — NURSING
Report to RAUL Membreno.  Patient transported by  bed to [facility]. No distress noted.

## 2023-07-13 NOTE — SUBJECTIVE & OBJECTIVE
Past Medical History:   Diagnosis Date    Anemia     Anxiety     Depressed     Diskitis     Hep C w/o coma, chronic     IV drug abuse     Kidney stone     Liver cirrhosis        Past Surgical History:   Procedure Laterality Date    ARTHROTOMY OF SHOULDER Left 11/15/2022    Procedure: ARTHROTOMY, SHOULDER;  Surgeon: Bjorn Hayes MD;  Location: [facility];  Service: Orthopedics;  Laterality: Left;  Left acromioclavicular joint arthrotomy    BACK SURGERY      benine tumor removal      forehead, age 9    CHOLECYSTECTOMY      KIDNEY SURGERY      LUMBAR FUSION Bilateral 3/2/2022    Procedure: FUSION, SPINE, LUMBAR;  Surgeon: Guille Templeton MD;  Location: [address];  Service: Neurosurgery;  Laterality: Bilateral;  AIRO  T10--Pelvis    LUMBAR FUSION N/A 1/24/2023    Procedure: **AIRO** T8-T12 POSTERIOR FUSION, T8-T10 LAMINECTOMY, HARDWARE REMOVAL AND WASHOUT;  Surgeon: Guille Templeton MD;  Location: [address];  Service: Neurosurgery;  Laterality: N/A;    REMOVAL OF HARDWARE FROM SPINE N/A 2/21/2022    Procedure: REMOVAL, HARDWARE, SPINE;  Surgeon: Guille Templeton MD;  Location: [address];  Service: Neurosurgery;  Laterality: N/A;  [place]    SPINE SURGERY      THORACIC LAMINECTOMY WITH FUSION N/A 2/2/2023    Procedure: LAMINECTOMY, SPINE, THORACIC, WITH FUSION (T4-Pelvis fusion w/ T9-10 corpectomies) **AIRO;  Surgeon: Guille Templeton MD;  Location: [address];  Service: Neurosurgery;  Laterality: N/A;  AIRO, 2 bovies, aquamantys, Globus, Depuy, antibiotic beads, TXA, Peroxide; [employer] plastics closure       Review of patient's allergies indicates:   Allergen Reactions    Bee venom protein (honey bee) Anaphylaxis     Patient reports she is allergic to bee stings.    Naproxen Anaphylaxis     Throat closing    12-23- Patient reports taking Ibuprofen 200 mg at home without problems. Verified X3. [place]    Wasp sting [allergen ext-venom-honey bee] Anaphylaxis    Adhesive Blisters    Shellfish containing products      Iodine and iodide containing products Hives and Itching     Allergic to iodine in seafood only    Nuts [tree nut] Rash       No current facility-administered medications on file prior to encounter.     Current Outpatient Medications on File Prior to Encounter   Medication Sig    bumetanide (BUMEX) 1 MG tablet Take 1 tablet (1 mg total) by mouth once daily.    buprenorphine-naloxone 8-2 mg (SUBOXONE) 8-2 mg Place under the tongue 2 (two) times a day.    DULoxetine (CYMBALTA) 60 MG capsule Take 60 mg by mouth once daily.    hydrOXYzine HCL (ATARAX) 25 MG tablet Take 1 tablet (25 mg total) by mouth 3 (three) times daily as needed for Anxiety.    hydrOXYzine pamoate (VISTARIL) 25 MG Cap Take 25 mg by mouth 2 (two) times daily as needed.    lactulose (CHRONULAC) 20 gram/30 mL Soln Take 30 mLs (20 g total) by mouth 3 (three) times daily.    melatonin (MELATIN) 3 mg tablet Take 2 tablets (6 mg total) by mouth nightly as needed for Insomnia.    pantoprazole (PROTONIX) 40 MG tablet Take 1 tablet (40 mg total) by mouth once daily.    spironolactone (ALDACTONE) 100 MG tablet Take 1 tablet (100 mg total) by mouth once daily.    tiZANidine (ZANAFLEX) 4 MG tablet Take 1 tablet (4 mg total) by mouth 3 (three) times daily as needed (Pain/muscle spasms).    acetaminophen (TYLENOL) 500 MG tablet Take 2 tablets (1,000 mg total) by mouth every 12 (twelve) hours. (Patient not taking: Reported on 6/22/2023)    calcium carbonate (TUMS) 200 mg calcium (500 mg) chewable tablet Take 2 tablets (1,000 mg total) by mouth 3 (three) times daily as needed.    folic acid (FOLVITE) 1 MG tablet Take 1 tablet (1 mg total) by mouth once daily. (Patient taking differently: Take 1 mg by mouth daily as needed (vitamin supplement).)    gabapentin (NEURONTIN) 300 MG capsule Take 3 capsules (900 mg total) by mouth 3 (three) times daily.    ondansetron (ZOFRAN) 4 MG tablet Take 8 mg by mouth 2 (two) times daily as needed for Nausea.    oxyCODONE  (ROXICODONE) 5 MG immediate release tablet Take 5 mg by mouth.    polyethylene glycol (GLYCOLAX) 17 gram PwPk Take 17 g by mouth daily as needed. (Patient not taking: Reported on 6/22/2023)    [DISCONTINUED] diclofenac sodium (VOLTAREN) 1 % Gel Apply 2 g topically 4 (four) times daily.     Family History       Problem Relation (Age of Onset)    Cirrhosis Father    Drug abuse Mother, Father    Hepatitis Mother, Father    Liver cancer Mother          Tobacco Use    Smoking status: Some Days     Packs/day: 0.50     Years: 23.00     Pack years: 11.50     Types: Cigarettes    Smokeless tobacco: Never    Tobacco comments:     patient states she knows she nees to quit.   Substance and Sexual Activity    Alcohol use: Not Currently     Comment: quit 2014    Drug use: Not Currently     Frequency: 4.0 times per week     Types: Marijuana     Comment: Patient denies needle use, only inhalation of methampehtamines or orally.  Last known drug use was prior to admission to hospital stay for surgery    Sexual activity: Yes     Partners: Male     Birth control/protection: None     Review of Systems   Constitutional:  Positive for fatigue. Negative for chills and fever.   HENT:  Negative for ear discharge and ear pain.    Eyes:  Negative for pain and discharge.   Respiratory:  Negative for cough and shortness of breath.    Cardiovascular:  Negative for chest pain (mostly associated when other areas in her body are in pain) and leg swelling.   Gastrointestinal:  Negative for nausea and vomiting.   Endocrine: Negative for cold intolerance and heat intolerance.   Genitourinary:  Negative for difficulty urinating and dysuria.   Musculoskeletal:  Positive for arthralgias, gait problem and myalgias.        Right arm pain    Skin:  Positive for wound. Negative for rash.   Neurological:  Negative for dizziness and headaches.   Psychiatric/Behavioral:  Negative for agitation and confusion.    Objective:     Vital Signs (Most Recent):  Temp:  97.5 °F (36.4 °C) (07/13/23 0730)  Pulse: 80 (07/13/23 0600)  Resp: 12 (07/13/23 0600)  BP: (!) 92/52 (07/13/23 0600)  SpO2: 97 % (07/13/23 0654) Vital Signs (24h Range):  Temp:  [97.1 °F (36.2 °C)-98 °F (36.7 °C)] 97.5 °F (36.4 °C)  Pulse:  [77-89] 80  Resp:  [12-31] 12  SpO2:  [96 %-100 %] 97 %  BP: ()/(48-61) 92/52     Weight: 118.9 kg (262 lb 2 oz)  Body mass index is 41.05 kg/m².     Physical Exam  Constitutional:       Appearance: Normal appearance.      Comments: Continued generalized pain     HENT:      Head: Normocephalic and atraumatic.   Cardiovascular:      Rate and Rhythm: Normal rate and regular rhythm.   Pulmonary:      Effort: Pulmonary effort is normal. No respiratory distress.   Abdominal:      General: Bowel sounds are normal. There is no distension.      Palpations: Abdomen is soft.      Tenderness: There is no abdominal tenderness.   Musculoskeletal:         General: Tenderness present.      Right lower leg: No edema.      Left lower leg: No edema.      Comments: RUE tenderness and decreased ROM  She will not cooperate to do a good shoulder exam due to pain   ROM improving today    Skin:     General: Skin is warm and dry.      Comments: Multiple wounds on  bilateral heels and sacral region    Neurological:      Mental Status: She is alert and oriented to person, place, and time. Mental status is at baseline.   Psychiatric:         Mood and Affect: Mood normal.         Behavior: Behavior normal.              Significant Labs: A1C: No results for input(s): HGBA1C in the last 4320 hours.  ABGs: No results for input(s): PH, PCO2, HCO3, POCSATURATED, BE, TOTALHB, COHB, METHB, O2HB, POCFIO2, PO2 in the last 48 hours.  Bilirubin:   Recent Labs   Lab 06/16/23  0519 07/06/23  2249 07/07/23  1133 07/08/23  0604 07/10/23  0559   BILITOT 2.4* 3.1* 3.2* 3.0* 2.4*       Blood Culture:   No results for input(s): LABBLOO in the last 48 hours.    BMP:   Recent Labs   Lab 07/12/23  0859   GLU 73   *    K 4.9      CO2 24   BUN 16   CREATININE 0.5   CALCIUM 7.5*       CBC:   Recent Labs   Lab 07/12/23  0859   WBC 2.16*   HGB 7.7*   HCT 25.8*   PLT 83*       CMP:   Recent Labs   Lab 07/12/23  0859   *   K 4.9      CO2 24   GLU 73   BUN 16   CREATININE 0.5   CALCIUM 7.5*   ANIONGAP 3*       Cardiac Markers:   No results for input(s): CKMB, MYOGLOBIN, BNP, TROPISTAT in the last 48 hours.    Coagulation:   No results for input(s): PT, INR, APTT in the last 48 hours.    Lactic Acid:   Recent Labs   Lab 07/11/23  1530   LACTATE 0.9       Lipase: No results for input(s): LIPASE in the last 48 hours.  Lipid Panel: No results for input(s): CHOL, HDL, LDLCALC, TRIG, CHOLHDL in the last 48 hours.  Magnesium:   No results for input(s): MG in the last 48 hours.    POCT Glucose: No results for input(s): POCTGLUCOSE in the last 48 hours.  Prealbumin: No results for input(s): PREALBUMIN in the last 48 hours.  Respiratory Culture: No results for input(s): GSRESP, RESPIRATORYC in the last 48 hours.  Troponin:   Recent Labs   Lab 07/11/23  1530   TROPONINI 0.007       TSH: No results for input(s): TSH in the last 4320 hours.  Urine Culture: No results for input(s): LABURIN in the last 48 hours.  Urine Studies:   No results for input(s): COLORU, APPEARANCEUA, PHUR, SPECGRAV, PROTEINUA, GLUCUA, KETONESU, BILIRUBINUA, OCCULTUA, NITRITE, UROBILINOGEN, LEUKOCYTESUR, RBCUA, WBCUA, BACTERIA, SQUAMEPITHEL, HYALINECASTS in the last 48 hours.    Invalid input(s): DILLONSUR      Significant Imaging: I have reviewed all pertinent imaging results/findings within the past 24 hours.

## 2023-07-13 NOTE — PLAN OF CARE
07/13/23 0818   Post-Acute Status   Post-Acute Authorization Placement   Coverage [facility] spoke with patient's payor ([employer]). Spoke with Carissa,  assigned to patient. She states she will reach out to post acute facilities to see if she could aid with placing patient.    Carissa  [phone]

## 2023-07-13 NOTE — PLAN OF CARE
Problem: Occupational Therapy  Goal: Occupational Therapy Goal  Description: Pt to perform UB dressing with MIN A seated EOB.   Pt to consistently demonstrate adherence to orthopedic precautions during all ADL's as instructed by OT.  Pt to demonstrate good dynamic and static seated balance as required to perform ADL's from seated level.  Pt to participate in upper extremity range of motion exercise program as educated by OT for maintenance or shoulder range of motion.  Pt to participate in EOB seated ADL for ~5 minutes for increased safety and activity tolerance upon discharge.    Outcome: Ongoing, Progressing   Cont with OT POC

## 2023-07-14 PROCEDURE — 99232 PR SUBSEQUENT HOSPITAL CARE,LEVL II: ICD-10-PCS | Mod: ,,, | Performed by: PHYSICIAN ASSISTANT

## 2023-07-14 PROCEDURE — 99232 SBSQ HOSP IP/OBS MODERATE 35: CPT | Mod: ,,, | Performed by: PHYSICIAN ASSISTANT

## 2023-07-14 PROCEDURE — 11000001 HC ACUTE MED/SURG PRIVATE ROOM

## 2023-07-14 PROCEDURE — 97110 THERAPEUTIC EXERCISES: CPT

## 2023-07-14 PROCEDURE — 27000221 HC OXYGEN, UP TO 24 HOURS

## 2023-07-14 PROCEDURE — 25000003 PHARM REV CODE 250: Performed by: SURGERY

## 2023-07-14 PROCEDURE — 97535 SELF CARE MNGMENT TRAINING: CPT | Mod: CO

## 2023-07-14 PROCEDURE — 25000003 PHARM REV CODE 250: Performed by: PHYSICIAN ASSISTANT

## 2023-07-14 PROCEDURE — 25000003 PHARM REV CODE 250: Performed by: FAMILY MEDICINE

## 2023-07-14 PROCEDURE — 27000207 HC ISOLATION

## 2023-07-14 PROCEDURE — 99900035 HC TECH TIME PER 15 MIN (STAT)

## 2023-07-14 PROCEDURE — 63600175 PHARM REV CODE 636 W HCPCS: Performed by: SURGERY

## 2023-07-14 PROCEDURE — 97530 THERAPEUTIC ACTIVITIES: CPT | Mod: CO

## 2023-07-14 PROCEDURE — 94761 N-INVAS EAR/PLS OXIMETRY MLT: CPT

## 2023-07-14 RX ORDER — GABAPENTIN 100 MG/1
200 CAPSULE ORAL 3 TIMES DAILY
Status: DISCONTINUED | OUTPATIENT
Start: 2023-07-14 | End: 2023-07-21 | Stop reason: HOSPADM

## 2023-07-14 RX ORDER — TRAMADOL HYDROCHLORIDE 50 MG/1
50 TABLET ORAL EVERY 6 HOURS PRN
Status: DISCONTINUED | OUTPATIENT
Start: 2023-07-14 | End: 2023-07-14

## 2023-07-14 RX ORDER — ELECTROLYTES/DEXTROSE
SOLUTION, ORAL ORAL 2 TIMES DAILY
Status: DISCONTINUED | OUTPATIENT
Start: 2023-07-14 | End: 2023-07-15

## 2023-07-14 RX ADMIN — MIDODRINE HYDROCHLORIDE 5 MG: 2.5 TABLET ORAL at 08:07

## 2023-07-14 RX ADMIN — ACETAMINOPHEN 500 MG: 500 TABLET ORAL at 08:07

## 2023-07-14 RX ADMIN — GABAPENTIN 200 MG: 100 CAPSULE ORAL at 08:07

## 2023-07-14 RX ADMIN — ONDANSETRON HYDROCHLORIDE 4 MG: 2 SOLUTION INTRAMUSCULAR; INTRAVENOUS at 08:07

## 2023-07-14 RX ADMIN — PANTOPRAZOLE SODIUM 40 MG: 40 TABLET, DELAYED RELEASE ORAL at 08:07

## 2023-07-14 RX ADMIN — HYDROCORTISONE: 0.01 CREAM TOPICAL at 03:07

## 2023-07-14 RX ADMIN — MIDODRINE HYDROCHLORIDE 5 MG: 2.5 TABLET ORAL at 02:07

## 2023-07-14 RX ADMIN — ACETAMINOPHEN 500 MG: 500 TABLET ORAL at 02:07

## 2023-07-14 RX ADMIN — Medication 6 MG: at 08:07

## 2023-07-14 RX ADMIN — GABAPENTIN 100 MG: 100 CAPSULE ORAL at 08:07

## 2023-07-14 RX ADMIN — GABAPENTIN 200 MG: 100 CAPSULE ORAL at 02:07

## 2023-07-14 NOTE — PLAN OF CARE
Problem: Bariatric Environmental Safety  Goal: Safety Maintained with Care  Outcome: Ongoing, Progressing     Problem: Impaired Wound Healing  Goal: Optimal Wound Healing  Outcome: Ongoing, Progressing     Problem: Skin Injury Risk Increased  Goal: Skin Health and Integrity  Outcome: Ongoing, Progressing

## 2023-07-14 NOTE — PT/OT/SLP PROGRESS
Physical Therapy Treatment    Patient Name:  Rachel Zazueta   MRN:  [id number]    Recommendations:     Discharge Recommendations: LTACH (long-term acute care hospital), nursing facility, basic, nursing facility, skilled  Discharge Equipment Recommendations: none  Barriers to discharge: Inaccessible home and Decreased caregiver support    Assessment:     Rachel Zazueta is a 42 y.o. female admitted with a medical diagnosis of <principal problem not specified>.  She presents with the following impairments/functional limitations: weakness, impaired endurance, impaired self care skills, impaired functional mobility, impaired balance, decreased upper extremity function, decreased lower extremity function, decreased safety awareness, pain, decreased ROM, impaired skin, edema Pt initially hesitant in participating with therapy activity due to reports of wanting pain meds.  During PT discussion, pt was demonstrating active movement for Left UE/LE and partial range to R UE/LE.  Pt able to tolerate the activities with Active, active-assistive ROM combined with mental imagery for pain management. Pt was able to tolerate activity with pt stating that she was happy that she was able to complete the exercises with not a lot of pain. .    Rehab Prognosis: Fair; patient would benefit from acute skilled PT services to address these deficits and reach maximum level of function.    Recent Surgery: * No surgery found *      Plan:     During this hospitalization, patient to be seen 5 x/week to address the identified rehab impairments via therapeutic activities, therapeutic exercises and progress toward the following goals:    Plan of Care Expires:  07/21/23    Subjective     Chief Complaint: pain  Patient/Family Comments/goals: to feel beter  Pain/Comfort:  Pain Rating 1: 10/10  Location - Side 1: Right  Location - Orientation 1: lower  Location 1: leg  Pain Addressed 1: Distraction  Pain Rating Post-Intervention 1:  10/10      Objective:     Communicated with patient and MD prior to session.  Patient found supine with gu catheter, peripheral IV, telemetry (bilateral heel protector) upon PT entry to room.     General Precautions: Standard, contact, fall  Orthopedic Precautions: N/A  Braces: N/A  Respiratory Status: Room air     Functional Mobility:  Rolling side to side -  for pressure relief with moderate to maximal assist      AM-PAC 6 CLICK MOBILITY  Turning over in bed (including adjusting bedclothes, sheets and blankets)?: 2  Sitting down on and standing up from a chair with arms (e.g., wheelchair, bedside commode, etc.): 1  Moving from lying on back to sitting on the side of the bed?: 1  Moving to and from a bed to a chair (including a wheelchair)?: 1  Need to walk in hospital room?: 1  Climbing 3-5 steps with a railing?: 1  Basic Mobility Total Score: 7       Treatment & Education:  Active assist ROM ex on B LE x 10 reps on each possible plane and upon tolerance with guided imagery to manage pain    Patient left supine with all lines intact and call button in reach..    GOALS:   Multidisciplinary Problems       Physical Therapy Goals          Problem: Physical Therapy    Goal Priority Disciplines Outcome Goal Variances Interventions   Physical Therapy Goal     PT, PT/OT Ongoing, Not Progressing     Description:   Patient will increase functional independence with mobility by performin. Pt able to perform and tolerate B LE ROM ex x 10 reps on each plane to inc mobility, inc body repositioning  and prevent in declining in functions.  2. Inc rolling to sides to minimal assistance and cues  3. Able to perform and tolerate  supine <>sit with Moderate Assistance and cues.  4. Able to perform and tolerate static sit at edge of the bed x ~10 minutes for inc participation and performance with self care activities.  5. Establish home ex program.                          Time Tracking:     PT Received On: 23  PT Start  Time: 0925     PT Stop Time: 0940  PT Total Time (min): 15 min     Billable Minutes: Therapeutic Exercise 10    Treatment Type: Treatment  PT/PTA: PT           07/14/2023

## 2023-07-14 NOTE — PLAN OF CARE
07/14/23 1144   Post-Acute Status   Post-Acute Authorization Placement   Post-Acute Placement Status Referrals Sent   Discharge Delays (!) Post-Acute Set-up         Patient remains awaiting SNF placement. Payor denied LTAC. Referrals have been sent to the following facilities:     [facility]--Declined (Not in Network)  [facility]--Declined (Medicaid will not cover SNF at our facility)   [facility]--Referral Received  [facility]--Declined (Medicaid Max and unable to take wounds)   [facility]   [facility]--Referral Received  [facility]--Declined (Unable to Meet Needs)   [facility]     Awaiting responses. Also patient remains awaiting 142. She is requiring a Level II Screen by the Office of Behavioral Health.     1300--Above list updated at this time.

## 2023-07-14 NOTE — PROGRESS NOTES
[place] [facility] ([facility])  [facility] Medicine  Progress Note    Patient Name: Rachel Zazueta  MRN: [id number]  Patient Class: IP- Inpatient   Admission Date: 7/6/2023  Length of Stay: 7 days  Attending Physician: Carol Veloz MD  Primary Care Provider: Dannielle Merino DO        Subjective:     Principal Problem:<principal problem not specified>        HPI:  Patient is a 42 year old female with medical history of anxiety, depression, substance use disorder (+ meth on UDS), hepatitis C with liver cirrhosis and TIPS procedure, spinal fusions and discits who presented to the ED with right arm pain.  Found to be hypotensive.  She has had dysuria but denies nausea, vomiting and abdominal pain.  Right arm pain started when she fell out of her wheel chair one week ago.  She is having difficulty moving it.  Pain is in elbow and shoulder.  She has intermittent chest pain that occurs mostly when she is pain elsewhere.  She has been unable to ambulate after discitis and has sores on her heels.          Admitted for Right arm pain and hypotension.        Overview/Hospital Course:  BP is stable, no MAP less than 65, most readings in the 70s   Afebrile   Blood Cx NGTD   Xrays with significant DJD C3/4/5  Shoulder xray with high riding humeral head suggesting a supraspinatous tear, but she is in too much pain to examine. Will likely need an MRI as outpt.    7/9  MAP is 65-84  UOP 36cc/hr  Remains Afebrile   Blood Cx NGTD     PT blood pressure on lower side.  Will decrease gabapentin.  H/H is low and 1 unit of pRBC ordered.  Risks and benefits discussed with patient.  Low suspicion for infection and IV meropenem discontinued.  I believe hypotension is due to volume depletion due to hypoalbuminemia and anemia of chronic disease.  Goal map greater than 65.      PT with continued bp on lower side. 500cc bolus ordered.  Suboxone and tizanidine d/c.  Decreased gabapentin.   H/H improved with 1 unit blood transfusion.  Will  continue to monitor.  Suspect bp on lower side due to liver disease but goal would be to maintain MAP greater than 65.  No obvious signs of infection.  Psych consulted for NH placement.      7/12 PT HD with map of 70.  BP improved with IV fluids.  Pt states she has full body pain but pain medications making her hypotensive.  Will only use tylenol and gabapentin.      7/13 Pt with hypoalbuminemia and hypotension.  Poor clinical prognosis.   Continue to discuss goals of care with patient.  Attempted to add midodrine however patient has not responded.  Will increase midodrine today.      7/14 PT bp improving and stating she is in significant pain.  Will increase gabapentin slowly.  Discussed avoiding opioid pain medications due to chronic pain.  Will add zoloft per psych recommendations.        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9    CHOLECYSTECTOMY      KIDNEY SURGERY      LUMBAR FUSION Bilateral 3/2/2022    Procedure: FUSION, SPINE, LUMBAR;  Surgeon: Guille Templeton MD;  Location: [address];  Service: Neurosurgery;  Laterality: Bilateral;  AIRO  T10--Pelvis    LUMBAR FUSION N/A 1/24/2023    Procedure: **AIRO** T8-T12 POSTERIOR FUSION, T8-T10 LAMINECTOMY, HARDWARE REMOVAL AND WASHOUT;  Surgeon: Guille Templeton MD;  Location: [address];  Service: Neurosurgery;  Laterality: N/A;    REMOVAL OF HARDWARE FROM SPINE N/A 2/21/2022    Procedure: REMOVAL, HARDWARE, SPINE;  Surgeon: Guille Templeton MD;  Location: [address];  Service:  Neurosurgery;  Laterality: N/A;  [place]    SPINE SURGERY      THORACIC LAMINECTOMY WITH FUSION N/A 2/2/2023    Procedure: LAMINECTOMY, SPINE, THORACIC, WITH FUSION (T4-Pelvis fusion w/ T9-10 corpectomies) **AIRO;  Surgeon: Guille Templeton MD;  Location: [facility] OR [address] [place] [address];  Service: Neurosurgery;  Laterality: N/A;  AIRO, 2 bovies, aquamantys, Globus, Depuy, antibiotic beads, TXA, Peroxide; [employer] plastics closure       Review of patient's allergies indicates:   Allergen Reactions    Bee venom protein (honey bee) Anaphylaxis     Patient reports she is allergic to bee stings.    Naproxen Anaphylaxis     Throat closing    12-23- Patient reports taking Ibuprofen 200 mg at home without problems. Verified X3. [place]    Wasp sting [allergen ext-venom-honey bee] Anaphylaxis    Adhesive Blisters    Shellfish containing products     Iodine and iodide containing products Hives and Itching     Allergic to iodine in seafood only    Nuts [tree nut] Rash       No current facility-administered medications on file prior to encounter.     Current Outpatient Medications on File Prior to Encounter   Medication Sig    bumetanide (BUMEX) 1 MG tablet Take 1 tablet (1 mg total) by mouth once daily.    buprenorphine-naloxone 8-2 mg (SUBOXONE) 8-2 mg Place under the tongue 2 (two) times a day.    DULoxetine (CYMBALTA) 60 MG capsule Take 60 mg by mouth once daily.    hydrOXYzine HCL (ATARAX) 25 MG tablet Take 1 tablet (25 mg total) by mouth 3 (three) times daily as needed for Anxiety.    hydrOXYzine pamoate (VISTARIL) 25 MG Cap Take 25 mg by mouth 2 (two) times daily as needed.    lactulose (CHRONULAC) 20 gram/30 mL Soln Take 30 mLs (20 g total) by mouth 3 (three) times daily.    melatonin (MELATIN) 3 mg tablet Take 2 tablets (6 mg total) by mouth nightly as needed for Insomnia.    pantoprazole (PROTONIX) 40 MG tablet Take 1 tablet (40 mg total) by mouth once daily.    spironolactone (ALDACTONE) 100 MG tablet Take 1 tablet (100 mg  total) by mouth once daily.    tiZANidine (ZANAFLEX) 4 MG tablet Take 1 tablet (4 mg total) by mouth 3 (three) times daily as needed (Pain/muscle spasms).    acetaminophen (TYLENOL) 500 MG tablet Take 2 tablets (1,000 mg total) by mouth every 12 (twelve) hours. (Patient not taking: Reported on 6/22/2023)    calcium carbonate (TUMS) 200 mg calcium (500 mg) chewable tablet Take 2 tablets (1,000 mg total) by mouth 3 (three) times daily as needed.    folic acid (FOLVITE) 1 MG tablet Take 1 tablet (1 mg total) by mouth once daily. (Patient taking differently: Take 1 mg by mouth daily as needed (vitamin supplement).)    gabapentin (NEURONTIN) 300 MG capsule Take 3 capsules (900 mg total) by mouth 3 (three) times daily.    ondansetron (ZOFRAN) 4 MG tablet Take 8 mg by mouth 2 (two) times daily as needed for Nausea.    oxyCODONE (ROXICODONE) 5 MG immediate release tablet Take 5 mg by mouth.    polyethylene glycol (GLYCOLAX) 17 gram PwPk Take 17 g by mouth daily as needed. (Patient not taking: Reported on 6/22/2023)    [DISCONTINUED] diclofenac sodium (VOLTAREN) 1 % Gel Apply 2 g topically 4 (four) times daily.     Family History       Problem Relation (Age of Onset)    Cirrhosis Father    Drug abuse Mother, Father    Hepatitis Mother, Father    Liver cancer Mother          Tobacco Use    Smoking status: Some Days     Packs/day: 0.50     Years: 23.00     Pack years: 11.50     Types: Cigarettes    Smokeless tobacco: Never    Tobacco comments:     patient states she knows she nees to quit.   Substance and Sexual Activity    Alcohol use: Not Currently     Comment: quit 2014    Drug use: Not Currently     Frequency: 4.0 times per week     Types: Marijuana     Comment: Patient denies needle use, only inhalation of methampehtamines or orally.  Last known drug use was prior to admission to hospital stay for surgery    Sexual activity: Yes     Partners: Male     Birth control/protection: None     Review of Systems    Constitutional:  Positive for fatigue. Negative for chills and fever.   HENT:  Negative for ear discharge and ear pain.    Eyes:  Negative for pain and discharge.   Respiratory:  Negative for cough and shortness of breath.    Cardiovascular:  Negative for chest pain (mostly associated when other areas in her body are in pain) and leg swelling.   Gastrointestinal:  Negative for nausea and vomiting.   Endocrine: Negative for cold intolerance and heat intolerance.   Genitourinary:  Negative for difficulty urinating and dysuria.   Musculoskeletal:  Positive for arthralgias, gait problem and myalgias.        Right arm pain    Skin:  Positive for wound. Negative for rash.   Neurological:  Negative for dizziness and headaches.   Psychiatric/Behavioral:  Negative for agitation and confusion.    Objective:     Vital Signs (Most Recent):  Temp: 98.4 °F (36.9 °C) (07/14/23 0723)  Pulse: 96 (07/14/23 0800)  Resp: 16 (07/14/23 0723)  BP: 113/69 (07/14/23 0723)  SpO2: 99 % (07/14/23 0741) Vital Signs (24h Range):  Temp:  [97.8 °F (36.6 °C)-98.7 °F (37.1 °C)] 98.4 °F (36.9 °C)  Pulse:  [] 96  Resp:  [11-36] 16  SpO2:  [95 %-100 %] 99 %  BP: ()/(50-85) 113/69     Weight: 118.9 kg (262 lb 2 oz)  Body mass index is 41.05 kg/m².     Physical Exam  Constitutional:       Appearance: Normal appearance.      Comments: Continued generalized pain     HENT:      Head: Normocephalic and atraumatic.   Cardiovascular:      Rate and Rhythm: Normal rate and regular rhythm.   Pulmonary:      Effort: Pulmonary effort is normal. No respiratory distress.   Abdominal:      General: Bowel sounds are normal. There is no distension.      Palpations: Abdomen is soft.      Tenderness: There is no abdominal tenderness.   Musculoskeletal:         General: Tenderness present.      Right lower leg: No edema.      Left lower leg: No edema.      Comments: RUE tenderness and decreased ROM  Worked with therapy and not expressing pain    Skin:      General: Skin is warm and dry.      Comments: Multiple wounds on  bilateral heels and sacral region    Neurological:      Mental Status: She is alert and oriented to person, place, and time. Mental status is at baseline.   Psychiatric:         Mood and Affect: Mood normal.         Behavior: Behavior normal.              Significant Labs: A1C: No results for input(s): HGBA1C in the last 4320 hours.  ABGs: No results for input(s): PH, PCO2, HCO3, POCSATURATED, BE, TOTALHB, COHB, METHB, O2HB, POCFIO2, PO2 in the last 48 hours.  Bilirubin:   Recent Labs   Lab 06/16/23  0519 07/06/23  2249 07/07/23  1133 07/08/23  0604 07/10/23  0559   BILITOT 2.4* 3.1* 3.2* 3.0* 2.4*       Blood Culture:   No results for input(s): LABBLOO in the last 48 hours.    BMP:   No results for input(s): GLU, NA, K, CL, CO2, BUN, CREATININE, CALCIUM, MG in the last 48 hours.    CBC:   No results for input(s): WBC, HGB, HCT, PLT in the last 48 hours.    CMP:   No results for input(s): NA, K, CL, CO2, GLU, BUN, CREATININE, CALCIUM, PROT, ALBUMIN, BILITOT, ALKPHOS, AST, ALT, ANIONGAP, EGFRNONAA in the last 48 hours.    Invalid input(s): ESTGFAFRICA    Cardiac Markers:   No results for input(s): CKMB, MYOGLOBIN, BNP, TROPISTAT in the last 48 hours.    Coagulation:   No results for input(s): PT, INR, APTT in the last 48 hours.    Lactic Acid:   No results for input(s): LACTATE in the last 48 hours.    Lipase: No results for input(s): LIPASE in the last 48 hours.  Lipid Panel: No results for input(s): CHOL, HDL, LDLCALC, TRIG, CHOLHDL in the last 48 hours.  Magnesium:   No results for input(s): MG in the last 48 hours.    POCT Glucose: No results for input(s): POCTGLUCOSE in the last 48 hours.  Prealbumin: No results for input(s): PREALBUMIN in the last 48 hours.  Respiratory Culture: No results for input(s): GSRESP, RESPIRATORYC in the last 48 hours.  Troponin:   No results for input(s): TROPONINI, TROPONINIHS in the last 48 hours.    TSH: No results  for input(s): TSH in the last 4320 hours.  Urine Culture: No results for input(s): LABURIN in the last 48 hours.  Urine Studies:   No results for input(s): COLORU, APPEARANCEUA, PHUR, SPECGRAV, PROTEINUA, GLUCUA, KETONESU, BILIRUBINUA, OCCULTUA, NITRITE, UROBILINOGEN, LEUKOCYTESUR, RBCUA, WBCUA, BACTERIA, SQUAMEPITHEL, HYALINECASTS in the last 48 hours.    Invalid input(s): WRIGHTSUR      Significant Imaging: I have reviewed all pertinent imaging results/findings within the past 24 hours.      Assessment/Plan:      Major depressive disorder  D/c cymbalta due to psych recommendations and start zoloft in 3-4 days.        Hypoalbuminemia  Lab Results   Component Value Date    ALBUMIN 1.4 (L) 07/10/2023       Dietician consult-- Continue Srinivasa BID per recs     Wounds, multiple  Bilateral heels with escharSacral region    General surgery consulted---santyl ordered. Will likely need to be debrided at some point       Right arm pain  Right arm pain and decreased ROM  XRAY cspine with DJD C3/4/5. This could cause some radicular pain radiating into the shoulder... she is on gabapentin  Her xray of shoulder shows that she has a high riding humeral head, concerning for a supraspinatus tear...she can address this as an outpt with an MRI        Will increase gabapentin       Hypotension  Component of hypoalbuminemia and suspicion for underlying infection  U/A abnormal but unclear if this is source  Blood cultures pending. If negative at 48-72 hours and vitals remain stable I would feel comfortable with discharge   MAP stable in 70s  7/9---Will give her a 500ml bolus as her systolic dipped to 88 this am    1 unit of pRBC, decrease gabapentin     7/11 BP still hypotensive. 500cc bolus ordered.  D/c suboxone and tizanidine.  Decreased gabapentin.  Echo pending.  Cortisol and ACTH wnl.      7/12 Some improvement.  Holding all pain medications except gabapentin.     7/13 MRI c spine limited due to motion but no obvious signs of  infection or abscess.  Started midodrine yesterday.  Will increase.  Patient has poor clinical prognosis.    7/14 Improving with midodrine       Weakness of both lower extremities  After discitis   Unable to ambulate       Debility  Chronic debility  Patient unable to care for herself  CM working placement to [facility] but denied.  Now attempting NH placement  PT is seeing her       Substance use disorder  + methamphetamine use on UDS  Cessation resources from CM at discharge   D/c suboxone for hypotension  Psych consulted for substance use disorder      Continue to defer opioid pain medications but pt requesting.  Gabapentin and antidepressant preferable for tx.      Cirrhosis of liver with ascites  Monitor AST and ALT  Ok to give her tylenol for pain, as long as she is getting less than 2g/day total     Picc line consult placed due to poor IV access         Abnormal urinalysis  + nitrites on U/A; WB on microscopic mildly elevated and only trace amount of leukocytes  Will tx due to symptomatic  H/o ESBL klebsiella Pneumoniae  D/c IV meropenem.  Low suspicion for UTI.  U/A did not reflex.          VTE Risk Mitigation (From admission, onward)         Ordered     IP VTE HIGH RISK PATIENT  Once         07/07/23 0107     Place sequential compression device  Until discontinued         07/07/23 0107                Discharge Planning   SHIRA:      Code Status: Full Code   Is the patient medically ready for discharge?:     Reason for patient still in hospital (select all that apply): Pending disposition  Discharge Plan A: Long-term acute care facility (LTAC)   Discharge Delays: (!) Post-Acute Set-up              Leyla Blair PA-C  Department of Hospital Medicine   [place] [facility] Surg (3rd Fl)

## 2023-07-14 NOTE — SUBJECTIVE & OBJECTIVE
Past Medical History:   Diagnosis Date    Anemia     Anxiety     Depressed     Diskitis     Hep C w/o coma, chronic     IV drug abuse     Kidney stone     Liver cirrhosis        Past Surgical History:   Procedure Laterality Date    ARTHROTOMY OF SHOULDER Left 11/15/2022    Procedure: ARTHROTOMY, SHOULDER;  Surgeon: Bjorn Hayes MD;  Location: [facility];  Service: Orthopedics;  Laterality: Left;  Left acromioclavicular joint arthrotomy    BACK SURGERY      benine tumor removal      forehead, age 9    CHOLECYSTECTOMY      KIDNEY SURGERY      LUMBAR FUSION Bilateral 3/2/2022    Procedure: FUSION, SPINE, LUMBAR;  Surgeon: Guille Templeton MD;  Location: [address];  Service: Neurosurgery;  Laterality: Bilateral;  AIRO  T10--Pelvis    LUMBAR FUSION N/A 1/24/2023    Procedure: **AIRO** T8-T12 POSTERIOR FUSION, T8-T10 LAMINECTOMY, HARDWARE REMOVAL AND WASHOUT;  Surgeon: Guille Templeton MD;  Location: [address];  Service: Neurosurgery;  Laterality: N/A;    REMOVAL OF HARDWARE FROM SPINE N/A 2/21/2022    Procedure: REMOVAL, HARDWARE, SPINE;  Surgeon: Guille Templeton MD;  Location: [address];  Service: Neurosurgery;  Laterality: N/A;  [place]    SPINE SURGERY      THORACIC LAMINECTOMY WITH FUSION N/A 2/2/2023    Procedure: LAMINECTOMY, SPINE, THORACIC, WITH FUSION (T4-Pelvis fusion w/ T9-10 corpectomies) **AIRO;  Surgeon: Guille Templeton MD;  Location: [address];  Service: Neurosurgery;  Laterality: N/A;  AIRO, 2 bovies, aquamantys, Globus, Depuy, antibiotic beads, TXA, Peroxide; [employer] plastics closure       Review of patient's allergies indicates:   Allergen Reactions    Bee venom protein (honey bee) Anaphylaxis     Patient reports she is allergic to bee stings.    Naproxen Anaphylaxis     Throat closing    12-23- Patient reports taking Ibuprofen 200 mg at home without problems. Verified X3. [place]    Wasp sting [allergen ext-venom-honey bee] Anaphylaxis    Adhesive Blisters    Shellfish containing products      Iodine and iodide containing products Hives and Itching     Allergic to iodine in seafood only    Nuts [tree nut] Rash       No current facility-administered medications on file prior to encounter.     Current Outpatient Medications on File Prior to Encounter   Medication Sig    bumetanide (BUMEX) 1 MG tablet Take 1 tablet (1 mg total) by mouth once daily.    buprenorphine-naloxone 8-2 mg (SUBOXONE) 8-2 mg Place under the tongue 2 (two) times a day.    DULoxetine (CYMBALTA) 60 MG capsule Take 60 mg by mouth once daily.    hydrOXYzine HCL (ATARAX) 25 MG tablet Take 1 tablet (25 mg total) by mouth 3 (three) times daily as needed for Anxiety.    hydrOXYzine pamoate (VISTARIL) 25 MG Cap Take 25 mg by mouth 2 (two) times daily as needed.    lactulose (CHRONULAC) 20 gram/30 mL Soln Take 30 mLs (20 g total) by mouth 3 (three) times daily.    melatonin (MELATIN) 3 mg tablet Take 2 tablets (6 mg total) by mouth nightly as needed for Insomnia.    pantoprazole (PROTONIX) 40 MG tablet Take 1 tablet (40 mg total) by mouth once daily.    spironolactone (ALDACTONE) 100 MG tablet Take 1 tablet (100 mg total) by mouth once daily.    tiZANidine (ZANAFLEX) 4 MG tablet Take 1 tablet (4 mg total) by mouth 3 (three) times daily as needed (Pain/muscle spasms).    acetaminophen (TYLENOL) 500 MG tablet Take 2 tablets (1,000 mg total) by mouth every 12 (twelve) hours. (Patient not taking: Reported on 6/22/2023)    calcium carbonate (TUMS) 200 mg calcium (500 mg) chewable tablet Take 2 tablets (1,000 mg total) by mouth 3 (three) times daily as needed.    folic acid (FOLVITE) 1 MG tablet Take 1 tablet (1 mg total) by mouth once daily. (Patient taking differently: Take 1 mg by mouth daily as needed (vitamin supplement).)    gabapentin (NEURONTIN) 300 MG capsule Take 3 capsules (900 mg total) by mouth 3 (three) times daily.    ondansetron (ZOFRAN) 4 MG tablet Take 8 mg by mouth 2 (two) times daily as needed for Nausea.    oxyCODONE  (ROXICODONE) 5 MG immediate release tablet Take 5 mg by mouth.    polyethylene glycol (GLYCOLAX) 17 gram PwPk Take 17 g by mouth daily as needed. (Patient not taking: Reported on 6/22/2023)    [DISCONTINUED] diclofenac sodium (VOLTAREN) 1 % Gel Apply 2 g topically 4 (four) times daily.     Family History       Problem Relation (Age of Onset)    Cirrhosis Father    Drug abuse Mother, Father    Hepatitis Mother, Father    Liver cancer Mother          Tobacco Use    Smoking status: Some Days     Packs/day: 0.50     Years: 23.00     Pack years: 11.50     Types: Cigarettes    Smokeless tobacco: Never    Tobacco comments:     patient states she knows she nees to quit.   Substance and Sexual Activity    Alcohol use: Not Currently     Comment: quit 2014    Drug use: Not Currently     Frequency: 4.0 times per week     Types: Marijuana     Comment: Patient denies needle use, only inhalation of methampehtamines or orally.  Last known drug use was prior to admission to hospital stay for surgery    Sexual activity: Yes     Partners: Male     Birth control/protection: None     Review of Systems   Constitutional:  Positive for fatigue. Negative for chills and fever.   HENT:  Negative for ear discharge and ear pain.    Eyes:  Negative for pain and discharge.   Respiratory:  Negative for cough and shortness of breath.    Cardiovascular:  Negative for chest pain (mostly associated when other areas in her body are in pain) and leg swelling.   Gastrointestinal:  Negative for nausea and vomiting.   Endocrine: Negative for cold intolerance and heat intolerance.   Genitourinary:  Negative for difficulty urinating and dysuria.   Musculoskeletal:  Positive for arthralgias, gait problem and myalgias.        Right arm pain    Skin:  Positive for wound. Negative for rash.   Neurological:  Negative for dizziness and headaches.   Psychiatric/Behavioral:  Negative for agitation and confusion.    Objective:     Vital Signs (Most Recent):  Temp:  98.4 °F (36.9 °C) (07/14/23 0723)  Pulse: 96 (07/14/23 0800)  Resp: 16 (07/14/23 0723)  BP: 113/69 (07/14/23 0723)  SpO2: 99 % (07/14/23 0741) Vital Signs (24h Range):  Temp:  [97.8 °F (36.6 °C)-98.7 °F (37.1 °C)] 98.4 °F (36.9 °C)  Pulse:  [] 96  Resp:  [11-36] 16  SpO2:  [95 %-100 %] 99 %  BP: ()/(50-85) 113/69     Weight: 118.9 kg (262 lb 2 oz)  Body mass index is 41.05 kg/m².     Physical Exam  Constitutional:       Appearance: Normal appearance.      Comments: Continued generalized pain     HENT:      Head: Normocephalic and atraumatic.   Cardiovascular:      Rate and Rhythm: Normal rate and regular rhythm.   Pulmonary:      Effort: Pulmonary effort is normal. No respiratory distress.   Abdominal:      General: Bowel sounds are normal. There is no distension.      Palpations: Abdomen is soft.      Tenderness: There is no abdominal tenderness.   Musculoskeletal:         General: Tenderness present.      Right lower leg: No edema.      Left lower leg: No edema.      Comments: RUE tenderness and decreased ROM  Worked with therapy and not expressing pain    Skin:     General: Skin is warm and dry.      Comments: Multiple wounds on  bilateral heels and sacral region    Neurological:      Mental Status: She is alert and oriented to person, place, and time. Mental status is at baseline.   Psychiatric:         Mood and Affect: Mood normal.         Behavior: Behavior normal.              Significant Labs: A1C: No results for input(s): HGBA1C in the last 4320 hours.  ABGs: No results for input(s): PH, PCO2, HCO3, POCSATURATED, BE, TOTALHB, COHB, METHB, O2HB, POCFIO2, PO2 in the last 48 hours.  Bilirubin:   Recent Labs   Lab 06/16/23  0519 07/06/23  2249 07/07/23  1133 07/08/23  0604 07/10/23  0559   BILITOT 2.4* 3.1* 3.2* 3.0* 2.4*       Blood Culture:   No results for input(s): LABBLOO in the last 48 hours.    BMP:   No results for input(s): GLU, NA, K, CL, CO2, BUN, CREATININE, CALCIUM, MG in the last 48  hours.    CBC:   No results for input(s): WBC, HGB, HCT, PLT in the last 48 hours.    CMP:   No results for input(s): NA, K, CL, CO2, GLU, BUN, CREATININE, CALCIUM, PROT, ALBUMIN, BILITOT, ALKPHOS, AST, ALT, ANIONGAP, EGFRNONAA in the last 48 hours.    Invalid input(s): ESTGFAFRICA    Cardiac Markers:   No results for input(s): CKMB, MYOGLOBIN, BNP, TROPISTAT in the last 48 hours.    Coagulation:   No results for input(s): PT, INR, APTT in the last 48 hours.    Lactic Acid:   No results for input(s): LACTATE in the last 48 hours.    Lipase: No results for input(s): LIPASE in the last 48 hours.  Lipid Panel: No results for input(s): CHOL, HDL, LDLCALC, TRIG, CHOLHDL in the last 48 hours.  Magnesium:   No results for input(s): MG in the last 48 hours.    POCT Glucose: No results for input(s): POCTGLUCOSE in the last 48 hours.  Prealbumin: No results for input(s): PREALBUMIN in the last 48 hours.  Respiratory Culture: No results for input(s): GSRESP, RESPIRATORYC in the last 48 hours.  Troponin:   No results for input(s): TROPONINI, TROPONINIHS in the last 48 hours.    TSH: No results for input(s): TSH in the last 4320 hours.  Urine Culture: No results for input(s): LABURIN in the last 48 hours.  Urine Studies:   No results for input(s): COLORU, APPEARANCEUA, PHUR, SPECGRAV, PROTEINUA, GLUCUA, KETONESU, BILIRUBINUA, OCCULTUA, NITRITE, UROBILINOGEN, LEUKOCYTESUR, RBCUA, WBCUA, BACTERIA, SQUAMEPITHEL, HYALINECASTS in the last 48 hours.    Invalid input(s): DILLONSURAJ      Significant Imaging: I have reviewed all pertinent imaging results/findings within the past 24 hours.

## 2023-07-14 NOTE — PT/OT/SLP PROGRESS
"Occupational Therapy   Treatment    Name: Rachel Zazueta  MRN: [id number]  Admitting Diagnosis:  <principal problem not specified>       Recommendations:     Discharge Recommendations: LTACH (long-term acute care hospital), nursing facility, basic, nursing facility, skilled  Discharge Equipment Recommendations:  none  Barriers to discharge:  Inaccessible home environment, Decreased caregiver support    Assessment:     Rachel Zazueta is a 42 y.o. female with a medical diagnosis of <principal problem not specified>.  Performance deficits affecting function are weakness, impaired endurance, impaired self care skills, impaired functional mobility, gait instability, impaired balance, decreased upper extremity function, decreased lower extremity function, decreased safety awareness, pain, decreased ROM, impaired skin, edema.     Rehab Prognosis:  Poor; patient would benefit from acute skilled OT services to address these deficits and reach maximum level of function.       Plan:     Patient to be seen 5 x/week to address the above listed problems via self-care/home management, therapeutic activities, therapeutic exercises  Plan of Care Expires:    Plan of Care Reviewed with: patient    Subjective     Chief Complaint: "I can't stand this pain. They are giving me Tylenol, what's that going to do?"  Patient/Family Comments/goals: none  Pain/Comfort:  Pain Rating 1: 8/10  Location - Side 1: Right  Location - Orientation 1: lower  Location 1: leg  Pain Addressed 1: Distraction  Pain Rating Post-Intervention 1: other (see comments) (Did not rate)  Location - Side 2: Right  Location - Orientation 2: upper  Location 2: shoulder  Pain Addressed 2: Distraction  Pain Rating Post-Intervention 2: other (see comments) (Did not rate)    Objective:     Communicated with: RN prior to session.  Patient found supine with gu catheter, peripheral IV, telemetry upon OT entry to room.    General Precautions: Standard, contact, fall  "   Orthopedic Precautions:N/A  Braces: N/A  Respiratory Status: Room air     Occupational Performance:     Bed Mobility:    Pt did not perform    Functional Mobility/Transfers:  Pt did not perfome    Activities of Daily Living:  Grooming: stand by assistance post set up for brushing teeth and washing face      [facility] 6 Click ADL: 11    Treatment & Education:  Pt was lying in bed in almost complete supine position upon entering room. Pt was crying stating that she was in so much pain and that they would only give her Tylenol. After redirecting patient and distracting her she agreed to wash her face. Eventually she agreed to elevate the HOB to be able to brush teeth. Pt tolerated HOB elevated to about 60 degrees and tolerated it without c/o pain to help increase I with sitting tolerance and endurance. Pt required stand by assistance for brushing teeth post set up. Pt sat up for the rest of the session in bed with elevated head without c/o pain and was taking and laughing throughout rest of session. Pt marlena tx well and would continue to benefit from skilled OT tx session.     Patient left HOB elevated with all lines intact, call button in reach, and RN notified    GOALS:   Multidisciplinary Problems       Occupational Therapy Goals          Problem: Occupational Therapy    Goal Priority Disciplines Outcome Interventions   Occupational Therapy Goal     OT, PT/OT Ongoing, Progressing    Description: Pt to perform UB dressing with MIN A seated EOB.   Pt to consistently demonstrate adherence to orthopedic precautions during all ADL's as instructed by OT.  Pt to demonstrate good dynamic and static seated balance as required to perform ADL's from seated level.  Pt to participate in upper extremity range of motion exercise program as educated by OT for maintenance or shoulder range of motion.  Pt to participate in EOB seated ADL for ~5 minutes for increased safety and activity tolerance upon discharge.                          Time Tracking:     OT Date of Treatment: 07/14/23  OT Start Time: 0307  OT Stop Time: 0357  OT Total Time (min): 50 min    Billable Minutes:Self Care/Home Management 25  Therapeutic Activity 25    OT/ROSS: ROSS     Number of ROSS visits since last OT visit: 2    7/14/2023

## 2023-07-14 NOTE — ASSESSMENT & PLAN NOTE
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 This could cause some radicular pain radiating into the shoulder... she is on gabapentin  Her xray of shoulder shows that she has a high riding humeral head, concerning for a supraspinatus tear...she can address this as an outpt with an MRI        Will increase gabapentin

## 2023-07-14 NOTE — ASSESSMENT & PLAN NOTE
+ methamphetamine use on UDS  Cessation resources from CM at discharge   D/c suboxone for hypotension  Psych consulted for substance use disorder      Continue to defer opioid pain medications but pt requesting.  Gabapentin and antidepressant preferable for tx.

## 2023-07-14 NOTE — PROGRESS NOTES
[facility] - Med Surg (3rd Fl)  Adult Nutrition  Consult Note    SUMMARY     Recommendations  1. Rec'd continue low sodium diet.   2. Rec'd Srinivasa BID. Please mix into 8-10 oz of water, sprite or juice to encourage consumption and promote wound healing.   3. Rec'd Boost Plus if PO intake inadequate.   4. RD to follow and make rec's accordingly.    Interventions:  1. Collaboration with other medical providers  2. Commercial beverage    Goals: Pt will meet at least 75% ENN by RD follow up.  Nutrition Goal Status: goal met  Communication of RD Recs: other (comment) (POC)    Assessment and Plan    Nutrition Problem  Increased protein and energy needs    Related to (etiology):   Altered skin integrity and other conditions requiring increased nutritional needs    Signs and Symptoms (as evidenced by):   Bilateral wounds to heel, sacrum; bed bound status, liver cirrhosis, UDS + for amphetamines and h/o substance abuse    Interventions/Recommendations (treatment strategy):  Interventions:  1. Collaboration with other medical providers  2. Commercial beverage    Recommendations:  1. Rec'd continue low sodium diet.   2. Rec'd Srinivasa BID. Please mix into 8-10 oz of water, sprite or juice to encourage consumption and promote wound healing.   3. Rec'd Boost Plus if PO intake inadequate.   4. RD to follow and make rec's accordingly.    Goals: Pt will meet at least 75% ENN by RD follow up.  Nutrition Goal Status: goal met      Nutrition Diagnosis Status:   Improving           Malnutrition Assessment                                       Reason for Assessment    Reason For Assessment: consult (hypoalbuminemia)  Diagnosis: other (see comments) (no active principal problem)  Interdisciplinary Rounds: did not attend  General Information Comments:   7/11/2023  Pt continues on low sodium diet with Srinivasa BID. Meeting ENN with % meal consumption. Drinking Srinivasa ONS BID and verbalized understanding of what it is for. Denies recent  "weight loss and N/V/C/D. Encouraged pt to continue with Srinivasa regimen and meal intake to promote wound healing. Noted iron panel completed indicating anemia of chronic disease. NFPE not appropriate at this time due to RD assessment findings. Will continue to monitor.      7/14/2023  No changes since last RD follow up. Pt still consuming adequate intake and drinking Srinivasa BID. Will continue to monitor.    Nutrition Discharge Planning: Pt to dc on low sodium diet and Srinivasa BID x 4 weeks from 7/7/2023     Nutrition Risk Screen    Nutrition Risk Screen: large or nonhealing wound, burn or pressure injury    Nutrition/Diet History  Food Preferences: macaroni and cheese, Coke, Sprite  Food Preferences: macaroni and cheese, Coke, Sprite  Spiritual, Cultural Beliefs, Adventism Practices, Values that Affect Care: no  Food Allergies: shellfish, tree nut, other (see comments) (iodine)  Factors Affecting Nutritional Intake: None identified at this time    Anthropometrics    Temp: 98.7 °F (37.1 °C)  Height Method: Stated  Height: 5' 7" (170.2 cm)  Height (inches): 67 in  Weight Method: Bed Scale  Weight: 118.9 kg (262 lb 2 oz)  Weight (lb): 262.13 lb  Ideal Body Weight (IBW), Female: 135 lb  % Ideal Body Weight, Female (lb): 194.17 %  BMI (Calculated): 41  BMI Grade: greater than 40 - morbid obesity       Lab/Procedures/Meds    Pertinent Labs Reviewed: reviewed    Pertinent Labs Comments: UDS + for amphetamines    Lab Results   Component Value Date     (L) 07/12/2023    CALCIUM 7.5 (L) 07/12/2023      Pertinent Medications Reviewed: reviewed  Pertinent Medications Comments: midodrine, PPI    Physical Findings/Assessment  Prince Score 14       Estimated/Assessed Needs    Weight Used For Calorie Calculations: 61.4 kg (135 lb 5.8 oz) (IBW)  Energy Calorie Requirements (kcal): 0711-4286  Energy Need Method: Kcal/kg (25-30 kcal/kg for wounds with obesity, cirrhosis, and substance abuse)  Protein Requirements: 77-92 g (1.25-1.5 " g/kg/d IBW for wounds, substance abuse and cirrhosis)  Weight Used For Protein Calculations: 61.4 kg (135 lb 5.8 oz) (IBW)  Fluid Requirements (mL): 0321-8522  Estimated Fluid Requirement Method: RDA Method (or per MD rec's)  RDA Method (mL): 1535         Nutrition Prescription Ordered    Current Diet Order: low sodium  Oral Nutrition Supplement: Srinivasa BID    Evaluation of Received Nutrient/Fluid Intake    I/O: none recorded  Energy Calories Required: meeting needs  Protein Required: meeting needs  Comments: LBM: 7/12/2023  Tolerance: tolerating  % Intake of Estimated Energy Needs: 75 - 100 %  % Meal Intake: 75 - 100 %    Nutrition Risk    Level of Risk/Frequency of Follow-up: moderate - high       Monitor and Evaluation    Food and Nutrient Intake: energy intake, food and beverage intake, enteral nutrition intake  Food and Nutrient Adminstration: diet order  Knowledge/Beliefs/Attitudes: food and nutrition knowledge/skill, beliefs and attitudes  Physical Activity and Function: nutrition-related ADLs and IADLs, factors affecting access to physical activity  Anthropometric Measurements: height/length, weight, weight change, body mass index  Biochemical Data, Medical Tests and Procedures: electrolyte and renal panel, gastrointestinal profile, glucose/endocrine profile, inflammatory profile, lipid profile  Nutrition-Focused Physical Findings: overall appearance       Nutrition Follow-Up    RD Follow-up?: Yes

## 2023-07-14 NOTE — ASSESSMENT & PLAN NOTE
Monitor AST and ALT  Ok to give her tylenol for pain, as long as she is getting less than 2g/day total     Picc line consult placed due to poor IV access

## 2023-07-14 NOTE — ASSESSMENT & PLAN NOTE
Component of hypoalbuminemia and suspicion for underlying infection  U/A abnormal but unclear if this is source  Blood cultures pending. If negative at 48-72 hours and vitals remain stable I would feel comfortable with discharge   MAP stable in 70s  7/9---Will give her a 500ml bolus as her systolic dipped to 88 this am    1 unit of pRBC, decrease gabapentin     7/11 BP still hypotensive. 500cc bolus ordered.  D/c suboxone and tizanidine.  Decreased gabapentin.  Echo pending.  Cortisol and ACTH wnl.      7/12 Some improvement.  Holding all pain medications except gabapentin.     7/13 MRI c spine limited due to motion but no obvious signs of infection or abscess.  Started midodrine yesterday.  Will increase.  Patient has poor clinical prognosis.    7/14 Improving with midodrine

## 2023-07-14 NOTE — PLAN OF CARE
Problem: Physical Therapy  Goal: Physical Therapy Goal  Description:   Patient will increase functional independence with mobility by performin. Pt able to perform and tolerate B LE ROM ex x 10 reps on each plane to inc mobility, inc body repositioning  and prevent in declining in functions.  2. Inc rolling to sides to minimal assistance and cues  3. Able to perform and tolerate  supine <>sit with Moderate Assistance and cues.  4. Able to perform and tolerate static sit at edge of the bed x ~10 minutes for inc participation and performance with self care activities.  5. Establish home ex program.     Outcome: Ongoing, Not Progressing

## 2023-07-15 LAB
ANION GAP SERPL CALC-SCNC: 4 MMOL/L (ref 8–16)
BASOPHILS # BLD AUTO: 0.02 K/UL (ref 0–0.2)
BASOPHILS NFR BLD: 0.6 % (ref 0–1.9)
BUN SERPL-MCNC: 12 MG/DL (ref 6–20)
CALCIUM SERPL-MCNC: 7.3 MG/DL (ref 8.7–10.5)
CHLORIDE SERPL-SCNC: 105 MMOL/L (ref 95–110)
CO2 SERPL-SCNC: 25 MMOL/L (ref 23–29)
CREAT SERPL-MCNC: 0.4 MG/DL (ref 0.5–1.4)
DIFFERENTIAL METHOD: ABNORMAL
EOSINOPHIL # BLD AUTO: 0.1 K/UL (ref 0–0.5)
EOSINOPHIL NFR BLD: 2.8 % (ref 0–8)
ERYTHROCYTE [DISTWIDTH] IN BLOOD BY AUTOMATED COUNT: 20 % (ref 11.5–14.5)
EST. GFR  (NO RACE VARIABLE): >60 ML/MIN/1.73 M^2
GLUCOSE SERPL-MCNC: 94 MG/DL (ref 70–110)
HCT VFR BLD AUTO: 27.9 % (ref 37–48.5)
HGB BLD-MCNC: 8.3 G/DL (ref 12–16)
IMM GRANULOCYTES # BLD AUTO: 0.03 K/UL (ref 0–0.04)
IMM GRANULOCYTES NFR BLD AUTO: 0.9 % (ref 0–0.5)
LYMPHOCYTES # BLD AUTO: 0.6 K/UL (ref 1–4.8)
LYMPHOCYTES NFR BLD: 17.5 % (ref 18–48)
MCH RBC QN AUTO: 30.1 PG (ref 27–31)
MCHC RBC AUTO-ENTMCNC: 29.7 G/DL (ref 32–36)
MCV RBC AUTO: 101 FL (ref 82–98)
MONOCYTES # BLD AUTO: 0.3 K/UL (ref 0.3–1)
MONOCYTES NFR BLD: 9.1 % (ref 4–15)
NEUTROPHILS # BLD AUTO: 2.2 K/UL (ref 1.8–7.7)
NEUTROPHILS NFR BLD: 69.1 % (ref 38–73)
NRBC BLD-RTO: 0 /100 WBC
PLATELET # BLD AUTO: 94 K/UL (ref 150–450)
PMV BLD AUTO: 11.9 FL (ref 9.2–12.9)
POTASSIUM SERPL-SCNC: 4.1 MMOL/L (ref 3.5–5.1)
RBC # BLD AUTO: 2.76 M/UL (ref 4–5.4)
SODIUM SERPL-SCNC: 134 MMOL/L (ref 136–145)
WBC # BLD AUTO: 3.2 K/UL (ref 3.9–12.7)

## 2023-07-15 PROCEDURE — 25000003 PHARM REV CODE 250: Performed by: SURGERY

## 2023-07-15 PROCEDURE — 94761 N-INVAS EAR/PLS OXIMETRY MLT: CPT

## 2023-07-15 PROCEDURE — 25000003 PHARM REV CODE 250: Performed by: FAMILY MEDICINE

## 2023-07-15 PROCEDURE — 27000207 HC ISOLATION

## 2023-07-15 PROCEDURE — 80048 BASIC METABOLIC PNL TOTAL CA: CPT | Performed by: STUDENT IN AN ORGANIZED HEALTH CARE EDUCATION/TRAINING PROGRAM

## 2023-07-15 PROCEDURE — 99233 SBSQ HOSP IP/OBS HIGH 50: CPT | Mod: ,,, | Performed by: STUDENT IN AN ORGANIZED HEALTH CARE EDUCATION/TRAINING PROGRAM

## 2023-07-15 PROCEDURE — 36415 COLL VENOUS BLD VENIPUNCTURE: CPT | Performed by: STUDENT IN AN ORGANIZED HEALTH CARE EDUCATION/TRAINING PROGRAM

## 2023-07-15 PROCEDURE — 99900035 HC TECH TIME PER 15 MIN (STAT)

## 2023-07-15 PROCEDURE — 36556 PR INSERT NON-TUNNEL CV CATH 5+ YRS OLD: ICD-10-PCS | Mod: RT,,, | Performed by: SURGERY

## 2023-07-15 PROCEDURE — 85025 COMPLETE CBC W/AUTO DIFF WBC: CPT | Performed by: STUDENT IN AN ORGANIZED HEALTH CARE EDUCATION/TRAINING PROGRAM

## 2023-07-15 PROCEDURE — 99233 PR SUBSEQUENT HOSPITAL CARE,LEVL III: ICD-10-PCS | Mod: ,,, | Performed by: STUDENT IN AN ORGANIZED HEALTH CARE EDUCATION/TRAINING PROGRAM

## 2023-07-15 PROCEDURE — 25000003 PHARM REV CODE 250: Performed by: STUDENT IN AN ORGANIZED HEALTH CARE EDUCATION/TRAINING PROGRAM

## 2023-07-15 PROCEDURE — 36556 INSERT NON-TUNNEL CV CATH: CPT | Mod: RT,,, | Performed by: SURGERY

## 2023-07-15 PROCEDURE — 11000001 HC ACUTE MED/SURG PRIVATE ROOM

## 2023-07-15 PROCEDURE — 25000003 PHARM REV CODE 250: Performed by: PHYSICIAN ASSISTANT

## 2023-07-15 RX ORDER — ELECTROLYTES/DEXTROSE
SOLUTION, ORAL ORAL 2 TIMES DAILY PRN
Status: DISCONTINUED | OUTPATIENT
Start: 2023-07-15 | End: 2023-07-21 | Stop reason: HOSPADM

## 2023-07-15 RX ORDER — LIDOCAINE 50 MG/G
1 PATCH TOPICAL
Status: DISCONTINUED | OUTPATIENT
Start: 2023-07-15 | End: 2023-07-21 | Stop reason: HOSPADM

## 2023-07-15 RX ORDER — HYDROCODONE BITARTRATE AND ACETAMINOPHEN 5; 325 MG/1; MG/1
1 TABLET ORAL EVERY 12 HOURS PRN
Status: DISCONTINUED | OUTPATIENT
Start: 2023-07-15 | End: 2023-07-21 | Stop reason: HOSPADM

## 2023-07-15 RX ORDER — SERTRALINE HYDROCHLORIDE 25 MG/1
25 TABLET, FILM COATED ORAL DAILY
Status: DISCONTINUED | OUTPATIENT
Start: 2023-07-15 | End: 2023-07-21 | Stop reason: HOSPADM

## 2023-07-15 RX ADMIN — GABAPENTIN 200 MG: 100 CAPSULE ORAL at 09:07

## 2023-07-15 RX ADMIN — MIDODRINE HYDROCHLORIDE 5 MG: 2.5 TABLET ORAL at 09:07

## 2023-07-15 RX ADMIN — ACETAMINOPHEN 500 MG: 500 TABLET ORAL at 09:07

## 2023-07-15 RX ADMIN — PANTOPRAZOLE SODIUM 40 MG: 40 TABLET, DELAYED RELEASE ORAL at 09:07

## 2023-07-15 RX ADMIN — SERTRALINE HYDROCHLORIDE 25 MG: 25 TABLET ORAL at 02:07

## 2023-07-15 RX ADMIN — COLLAGENASE SANTYL: 250 OINTMENT TOPICAL at 09:07

## 2023-07-15 RX ADMIN — MIDODRINE HYDROCHLORIDE 5 MG: 2.5 TABLET ORAL at 02:07

## 2023-07-15 RX ADMIN — LIDOCAINE 1 PATCH: 50 PATCH CUTANEOUS at 01:07

## 2023-07-15 RX ADMIN — ACETAMINOPHEN 500 MG: 500 TABLET ORAL at 05:07

## 2023-07-15 RX ADMIN — Medication 6 MG: at 09:07

## 2023-07-15 RX ADMIN — GABAPENTIN 200 MG: 100 CAPSULE ORAL at 02:07

## 2023-07-15 NOTE — PROGRESS NOTES
[facility] ([facility])  [facility] Medicine  Progress Note    Patient Name: Rachel Zazueta  MRN: [id number]  Patient Class: IP- Inpatient   Admission Date: 7/6/2023  Length of Stay: 8 days  Attending Physician: Carol Veloz MD  Primary Care Provider: Dannielle Merino DO        Subjective:     Principal Problem:Debility        HPI:  Patient is a 42 year old female with medical history of anxiety, depression, substance use disorder (+ meth on UDS), hepatitis C with liver cirrhosis and TIPS procedure, spinal fusions and discits who presented to the ED with right arm pain.  Found to be hypotensive.  She has had dysuria but denies nausea, vomiting and abdominal pain.  Right arm pain started when she fell out of her wheel chair one week ago.  She is having difficulty moving it.  Pain is in elbow and shoulder.  She has intermittent chest pain that occurs mostly when she is pain elsewhere.  She has been unable to ambulate after discitis and has sores on her heels.          Admitted for Right arm pain and hypotension.        Overview/Hospital Course:  BP is stable, no MAP less than 65, most readings in the 70s   Afebrile   Blood Cx NGTD   Xrays with significant DJD C3/4/5  Shoulder xray with high riding humeral head suggesting a supraspinatous tear, but she is in too much pain to examine. Will likely need an MRI as outpt.    7/9  MAP is 65-84  UOP 36cc/hr  Remains Afebrile   Blood Cx NGTD     PT blood pressure on lower side.  Will decrease gabapentin.  H/H is low and 1 unit of pRBC ordered.  Risks and benefits discussed with patient.  Low suspicion for infection and IV meropenem discontinued.  I believe hypotension is due to volume depletion due to hypoalbuminemia and anemia of chronic disease.  Goal map greater than 65.      PT with continued bp on lower side. 500cc bolus ordered.  Suboxone and tizanidine d/c.  Decreased gabapentin.   H/H improved with 1 unit blood transfusion.  Will continue to monitor.   Suspect bp on lower side due to liver disease but goal would be to maintain MAP greater than 65.  No obvious signs of infection.  Psych consulted for NH placement.      7/12 PT HD with map of 70.  BP improved with IV fluids.  Pt states she has full body pain but pain medications making her hypotensive.  Will only use tylenol and gabapentin.      7/13 Pt with hypoalbuminemia and hypotension.  Poor clinical prognosis.   Continue to discuss goals of care with patient.  Attempted to add midodrine however patient has not responded.  Will increase midodrine today.      7/14 PT bp improving and stating she is in significant pain.  Will increase gabapentin slowly.  Discussed avoiding opioid pain medications due to chronic pain.  Will add zoloft per psych recommendations.      7/15 BP stable. Remains with pain complaints. BP remains low. Unsuccessful blood draws. Unsuccessful PICC placement, will consult gen surg for central line as we have no other IV access. Start lidocaine patch and continue gabapentin. Again discussed need to avoid opioid pain meds due to hypotension especially in light of no IV access for resuscitation, she expressed understanding. She does not want hospice/comfort measures at this time.         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9    CHOLECYSTECTOMY      KIDNEY SURGERY      LUMBAR FUSION Bilateral 3/2/2022    Procedure: FUSION, SPINE, LUMBAR;  Surgeon: Guille Templeton MD;  Location: [address];  Service: Neurosurgery;   Laterality: Bilateral;  AIRO  T10--Pelvis    LUMBAR FUSION N/A 1/24/2023    Procedure: **AIRO** T8-T12 POSTERIOR FUSION, T8-T10 LAMINECTOMY, HARDWARE REMOVAL AND WASHOUT;  Surgeon: Guille Templeton MD;  [facility];  Service: Neurosurgery;  Laterality: N/A;    REMOVAL OF HARDWARE FROM SPINE N/A 2/21/2022    Procedure: REMOVAL, HARDWARE, SPINE;  Surgeon: Guille Templeton MD;  [facility]: [facility];  Service: Neurosurgery;  Laterality: N/A;  [place]    SPINE SURGERY      THORACIC LAMINECTOMY WITH FUSION N/A 2/2/2023    Procedure: LAMINECTOMY, SPINE, THORACIC, WITH FUSION (T4-Pelvis fusion w/ T9-10 corpectomies) **AIRO;  Surgeon: Guille Templeton MD;  [facility];  Service: Neurosurgery;  Laterality: N/A;  AIRO, 2 bovies, aquamantys, Globus, Depuy, antibiotic beads, TXA, Peroxide; [employer] plastics closure       Review of patient's allergies indicates:   Allergen Reactions    Bee venom protein (honey bee) Anaphylaxis     Patient reports she is allergic to bee stings.    Naproxen Anaphylaxis     Throat closing    12-23- Patient reports taking Ibuprofen 200 mg at home without problems. Verified X3. [place]    Wasp sting [allergen ext-venom-honey bee] Anaphylaxis    Adhesive Blisters    Shellfish containing products     Iodine and iodide containing products Hives and Itching     Allergic to iodine in seafood only    Nuts [tree nut] Rash       No current facility-administered medications on file prior to encounter.     Current Outpatient Medications on File Prior to Encounter   Medication Sig    bumetanide (BUMEX) 1 MG tablet Take 1 tablet (1 mg total) by mouth once daily.    buprenorphine-naloxone 8-2 mg (SUBOXONE) 8-2 mg Place under the tongue 2 (two) times a day.    DULoxetine (CYMBALTA) 60 MG capsule Take 60 mg by mouth once daily.    hydrOXYzine HCL (ATARAX) 25 MG tablet Take 1 tablet (25 mg total) by mouth 3 (three) times daily as needed for Anxiety.    hydrOXYzine pamoate  (VISTARIL) 25 MG Cap Take 25 mg by mouth 2 (two) times daily as needed.    lactulose (CHRONULAC) 20 gram/30 mL Soln Take 30 mLs (20 g total) by mouth 3 (three) times daily.    melatonin (MELATIN) 3 mg tablet Take 2 tablets (6 mg total) by mouth nightly as needed for Insomnia.    pantoprazole (PROTONIX) 40 MG tablet Take 1 tablet (40 mg total) by mouth once daily.    spironolactone (ALDACTONE) 100 MG tablet Take 1 tablet (100 mg total) by mouth once daily.    tiZANidine (ZANAFLEX) 4 MG tablet Take 1 tablet (4 mg total) by mouth 3 (three) times daily as needed (Pain/muscle spasms).    acetaminophen (TYLENOL) 500 MG tablet Take 2 tablets (1,000 mg total) by mouth every 12 (twelve) hours. (Patient not taking: Reported on 6/22/2023)    calcium carbonate (TUMS) 200 mg calcium (500 mg) chewable tablet Take 2 tablets (1,000 mg total) by mouth 3 (three) times daily as needed.    folic acid (FOLVITE) 1 MG tablet Take 1 tablet (1 mg total) by mouth once daily. (Patient taking differently: Take 1 mg by mouth daily as needed (vitamin supplement).)    gabapentin (NEURONTIN) 300 MG capsule Take 3 capsules (900 mg total) by mouth 3 (three) times daily.    ondansetron (ZOFRAN) 4 MG tablet Take 8 mg by mouth 2 (two) times daily as needed for Nausea.    oxyCODONE (ROXICODONE) 5 MG immediate release tablet Take 5 mg by mouth.    polyethylene glycol (GLYCOLAX) 17 gram PwPk Take 17 g by mouth daily as needed. (Patient not taking: Reported on 6/22/2023)    [DISCONTINUED] diclofenac sodium (VOLTAREN) 1 % Gel Apply 2 g topically 4 (four) times daily.     Family History       Problem Relation (Age of Onset)    Cirrhosis Father    Drug abuse Mother, Father    Hepatitis Mother, Father    Liver cancer Mother          Tobacco Use    Smoking status: Some Days     Packs/day: 0.50     Years: 23.00     Pack years: 11.50     Types: Cigarettes    Smokeless tobacco: Never    Tobacco comments:     patient states she knows she nees to  quit.   Substance and Sexual Activity    Alcohol use: Not Currently     Comment: quit 2014    Drug use: Not Currently     Frequency: 4.0 times per week     Types: Marijuana     Comment: Patient denies needle use, only inhalation of methampehtamines or orally.  Last known drug use was prior to admission to hospital stay for surgery    Sexual activity: Yes     Partners: Male     Birth control/protection: None     Review of Systems   Constitutional:  Positive for fatigue. Negative for chills and fever.   HENT:  Negative for congestion and sore throat.    Respiratory:  Negative for cough and shortness of breath.    Cardiovascular:  Negative for chest pain and leg swelling.   Gastrointestinal:  Negative for abdominal pain, diarrhea, nausea and vomiting.   Genitourinary:  Negative for dysuria and hematuria.   Musculoskeletal:  Positive for arthralgias, gait problem and myalgias.        Right arm pain    Skin:  Positive for wound. Negative for rash.   Neurological:  Negative for dizziness and headaches.   Psychiatric/Behavioral:  Positive for agitation. Negative for confusion.    Objective:     Vital Signs (Most Recent):  Temp: 98 °F (36.7 °C) (07/15/23 1123)  Pulse: 93 (07/15/23 1123)  Resp: 16 (07/15/23 1123)  BP: (!) 123/56 (07/15/23 1123)  SpO2: 98 % (07/15/23 1123) Vital Signs (24h Range):  Temp:  [98 °F (36.7 °C)-99 °F (37.2 °C)] 98 °F (36.7 °C)  Pulse:  [86-96] 93  Resp:  [16-18] 16  SpO2:  [96 %-100 %] 98 %  BP: (107-128)/(56-66) 123/56     Weight: 118.9 kg (262 lb 2 oz)  Body mass index is 41.05 kg/m².     Physical Exam  Constitutional:       Appearance: Normal appearance.      Comments: Continued generalized pain     HENT:      Head: Normocephalic and atraumatic.   Cardiovascular:      Rate and Rhythm: Normal rate and regular rhythm.   Pulmonary:      Effort: Pulmonary effort is normal. No respiratory distress.   Abdominal:      General: Bowel sounds are normal. There is no distension.      Palpations: Abdomen  is soft.      Tenderness: There is no abdominal tenderness.   Musculoskeletal:         General: Tenderness present.      Right lower leg: No edema.      Left lower leg: No edema.      Comments: RUE tenderness and decreased ROM  Worked with therapy and not expressing pain    Skin:     General: Skin is warm and dry.      Comments: Multiple wounds on  bilateral heels and sacral region    Neurological:      Mental Status: She is alert and oriented to person, place, and time. Mental status is at baseline.   Psychiatric:         Mood and Affect: Mood normal.         Behavior: Behavior normal.              Significant Labs: A1C: No results for input(s): HGBA1C in the last 4320 hours.  ABGs: No results for input(s): PH, PCO2, HCO3, POCSATURATED, BE, TOTALHB, COHB, METHB, O2HB, POCFIO2, PO2 in the last 48 hours.  Bilirubin:   Recent Labs   Lab 06/16/23  0519 07/06/23  2249 07/07/23  1133 07/08/23  0604 07/10/23  0559   BILITOT 2.4* 3.1* 3.2* 3.0* 2.4*       Blood Culture:   No results for input(s): LABBLOO in the last 48 hours.    BMP:   No results for input(s): GLU, NA, K, CL, CO2, BUN, CREATININE, CALCIUM, MG in the last 48 hours.    CBC:   No results for input(s): WBC, HGB, HCT, PLT in the last 48 hours.    CMP:   No results for input(s): NA, K, CL, CO2, GLU, BUN, CREATININE, CALCIUM, PROT, ALBUMIN, BILITOT, ALKPHOS, AST, ALT, ANIONGAP, EGFRNONAA in the last 48 hours.    Invalid input(s): ESTGFAFRICA    Cardiac Markers:   No results for input(s): CKMB, MYOGLOBIN, BNP, TROPISTAT in the last 48 hours.    Coagulation:   No results for input(s): PT, INR, APTT in the last 48 hours.    Lactic Acid:   No results for input(s): LACTATE in the last 48 hours.    Lipase: No results for input(s): LIPASE in the last 48 hours.  Lipid Panel: No results for input(s): CHOL, HDL, LDLCALC, TRIG, CHOLHDL in the last 48 hours.  Magnesium:   No results for input(s): MG in the last 48 hours.    POCT Glucose: No results for input(s): POCTGLUCOSE  in the last 48 hours.  Prealbumin: No results for input(s): PREALBUMIN in the last 48 hours.  Respiratory Culture: No results for input(s): GSRESP, RESPIRATORYC in the last 48 hours.  Troponin:   No results for input(s): TROPONINI, TROPONINIHS in the last 48 hours.    TSH: No results for input(s): TSH in the last 4320 hours.  Urine Culture: No results for input(s): LABURIN in the last 48 hours.  Urine Studies:   No results for input(s): COLORU, APPEARANCEUA, PHUR, SPECGRAV, PROTEINUA, GLUCUA, KETONESU, BILIRUBINUA, OCCULTUA, NITRITE, UROBILINOGEN, LEUKOCYTESUR, RBCUA, WBCUA, BACTERIA, SQUAMEPITHEL, HYALINECASTS in the last 48 hours.    Invalid input(s): WRIGHTSUR      Significant Imaging: I have reviewed all pertinent imaging results/findings within the past 24 hours.      Assessment/Plan:      * Debility  Chronic debility  Patient unable to care for herself  CM working placement to [facility] but denied.  Now attempting NH placement  PT is seeing her       Non healing left heel wound        Major depressive disorder  D/c'd cymbalta due to psych recommendations; start zoloft today      Hypoalbuminemia  Lab Results   Component Value Date    ALBUMIN 1.4 (L) 07/10/2023       Dietician consult-- Continue Srinivasa BID per recs     Wounds, multiple  Bilateral heels with escharSacral region    General surgery consulted---santyl ordered.       Right arm pain  Right arm pain and decreased ROM  XRAY cspine with DJD C3/4/5. This could cause some radicular pain radiating into the shoulder... she is on gabapentin  Her xray of shoulder shows that she has a high riding humeral head, concerning for a supraspinatus tear...she can address this as an outpt with an MRI      Titrate gabapentin as needed  Lidocaine patch      Hypotension  Component of hypoalbuminemia and suspicion for underlying infection  U/A abnormal but unclear if this is source  Blood cultures pending. If negative at 48-72 hours and vitals remain stable I would feel  comfortable with discharge   MAP stable in 70s  7/9---Will give her a 500ml bolus as her systolic dipped to 88 this am    1 unit of pRBC, decrease gabapentin     7/11 BP still hypotensive. 500cc bolus ordered.  D/c suboxone and tizanidine.  Decreased gabapentin.  Echo pending.  Cortisol and ACTH wnl.      7/12 Some improvement.  Holding all pain medications except gabapentin.     7/13 MRI c spine limited due to motion but no obvious signs of infection or abscess.  Started midodrine yesterday.  Will increase.  Patient has poor clinical prognosis.    7/14 Improving with midodrine      7/15 stable; monitor     Weakness of both lower extremities  After discitis   Unable to ambulate       Substance use disorder  + methamphetamine use on UDS  Cessation resources from CM at discharge   D/c suboxone for hypotension  Psych consulted for substance use disorder      Continue to defer opioid pain medications but pt requesting. BP does not tolerate and no IV access at this time. Gabapentin and antidepressant preferable for tx.  Start lidocaine patch    Cirrhosis of liver with ascites  Monitor AST and ALT  Ok to give her tylenol for pain, as long as she is getting less than 2g/day total     Picc line consult placed due to poor IV access; failed  Consult placed for central line        Abnormal urinalysis  + nitrites on U/A; WB on microscopic mildly elevated and only trace amount of leukocytes  Will tx due to symptomatic  H/o ESBL klebsiella Pneumoniae  D/c IV meropenem.  Low suspicion for UTI.  U/A did not reflex.        VTE Risk Mitigation (From admission, onward)         Ordered     IP VTE HIGH RISK PATIENT  Once         07/07/23 0107     Place sequential compression device  Until discontinued         07/07/23 0107                Discharge Planning   SHIRA:      Code Status: Full Code   Is the patient medically ready for discharge?:     Reason for patient still in hospital (select all that apply): Patient trending condition,  Treatment and Pending disposition  Discharge Plan A: Long-term acute care facility (LTAC)   Discharge Delays: (!) Post-Acute Set-up              Himanshu Bowman MD  Department of Hospital Medicine   [facility] ([facility])

## 2023-07-15 NOTE — PLAN OF CARE
Central line placed RIJ.   Contact precautions maintained.   Problem: Bariatric Environmental Safety  Goal: Safety Maintained with Care  Outcome: Ongoing, Progressing     Problem: Impaired Wound Healing  Goal: Optimal Wound Healing  Outcome: Ongoing, Progressing     Problem: Skin Injury Risk Increased  Goal: Skin Health and Integrity  Outcome: Ongoing, Progressing

## 2023-07-15 NOTE — SUBJECTIVE & OBJECTIVE
Past Medical History:   Diagnosis Date    Anemia     Anxiety     Depressed     Diskitis     Hep C w/o coma, chronic     IV drug abuse     Kidney stone     Liver cirrhosis        Past Surgical History:   Procedure Laterality Date    ARTHROTOMY OF SHOULDER Left 11/15/2022    Procedure: ARTHROTOMY, SHOULDER;  Surgeon: Bjorn Hayes MD;  Location: [facility];  Service: Orthopedics;  Laterality: Left;  Left acromioclavicular joint arthrotomy    BACK SURGERY      benine tumor removal      forehead, age 9    CHOLECYSTECTOMY      KIDNEY SURGERY      LUMBAR FUSION Bilateral 3/2/2022    Procedure: FUSION, SPINE, LUMBAR;  Surgeon: Guille Templeton MD;  Location: [address];  Service: Neurosurgery;  Laterality: Bilateral;  AIRO  T10--Pelvis    LUMBAR FUSION N/A 1/24/2023    Procedure: **AIRO** T8-T12 POSTERIOR FUSION, T8-T10 LAMINECTOMY, HARDWARE REMOVAL AND WASHOUT;  Surgeon: Guille Templeton MD;  Location: [address];  Service: Neurosurgery;  Laterality: N/A;    REMOVAL OF HARDWARE FROM SPINE N/A 2/21/2022    Procedure: REMOVAL, HARDWARE, SPINE;  Surgeon: Guille Templeton MD;  Location: [address];  Service: Neurosurgery;  Laterality: N/A;  [place]    SPINE SURGERY      THORACIC LAMINECTOMY WITH FUSION N/A 2/2/2023    Procedure: LAMINECTOMY, SPINE, THORACIC, WITH FUSION (T4-Pelvis fusion w/ T9-10 corpectomies) **AIRO;  Surgeon: Guille Templeton MD;  Location: [address];  Service: Neurosurgery;  Laterality: N/A;  AIRO, 2 bovies, aquamantys, Globus, Depuy, antibiotic beads, TXA, Peroxide; [employer] plastics closure       Review of patient's allergies indicates:   Allergen Reactions    Bee venom protein (honey bee) Anaphylaxis     Patient reports she is allergic to bee stings.    Naproxen Anaphylaxis     Throat closing    12-23- Patient reports taking Ibuprofen 200 mg at home without problems. Verified X3. [place]    Wasp sting [allergen ext-venom-honey bee] Anaphylaxis    Adhesive Blisters    Shellfish containing products      Iodine and iodide containing products Hives and Itching     Allergic to iodine in seafood only    Nuts [tree nut] Rash       No current facility-administered medications on file prior to encounter.     Current Outpatient Medications on File Prior to Encounter   Medication Sig    bumetanide (BUMEX) 1 MG tablet Take 1 tablet (1 mg total) by mouth once daily.    buprenorphine-naloxone 8-2 mg (SUBOXONE) 8-2 mg Place under the tongue 2 (two) times a day.    DULoxetine (CYMBALTA) 60 MG capsule Take 60 mg by mouth once daily.    hydrOXYzine HCL (ATARAX) 25 MG tablet Take 1 tablet (25 mg total) by mouth 3 (three) times daily as needed for Anxiety.    hydrOXYzine pamoate (VISTARIL) 25 MG Cap Take 25 mg by mouth 2 (two) times daily as needed.    lactulose (CHRONULAC) 20 gram/30 mL Soln Take 30 mLs (20 g total) by mouth 3 (three) times daily.    melatonin (MELATIN) 3 mg tablet Take 2 tablets (6 mg total) by mouth nightly as needed for Insomnia.    pantoprazole (PROTONIX) 40 MG tablet Take 1 tablet (40 mg total) by mouth once daily.    spironolactone (ALDACTONE) 100 MG tablet Take 1 tablet (100 mg total) by mouth once daily.    tiZANidine (ZANAFLEX) 4 MG tablet Take 1 tablet (4 mg total) by mouth 3 (three) times daily as needed (Pain/muscle spasms).    acetaminophen (TYLENOL) 500 MG tablet Take 2 tablets (1,000 mg total) by mouth every 12 (twelve) hours. (Patient not taking: Reported on 6/22/2023)    calcium carbonate (TUMS) 200 mg calcium (500 mg) chewable tablet Take 2 tablets (1,000 mg total) by mouth 3 (three) times daily as needed.    folic acid (FOLVITE) 1 MG tablet Take 1 tablet (1 mg total) by mouth once daily. (Patient taking differently: Take 1 mg by mouth daily as needed (vitamin supplement).)    gabapentin (NEURONTIN) 300 MG capsule Take 3 capsules (900 mg total) by mouth 3 (three) times daily.    ondansetron (ZOFRAN) 4 MG tablet Take 8 mg by mouth 2 (two) times daily as needed for Nausea.    oxyCODONE  (ROXICODONE) 5 MG immediate release tablet Take 5 mg by mouth.    polyethylene glycol (GLYCOLAX) 17 gram PwPk Take 17 g by mouth daily as needed. (Patient not taking: Reported on 6/22/2023)    [DISCONTINUED] diclofenac sodium (VOLTAREN) 1 % Gel Apply 2 g topically 4 (four) times daily.     Family History       Problem Relation (Age of Onset)    Cirrhosis Father    Drug abuse Mother, Father    Hepatitis Mother, Father    Liver cancer Mother          Tobacco Use    Smoking status: Some Days     Packs/day: 0.50     Years: 23.00     Pack years: 11.50     Types: Cigarettes    Smokeless tobacco: Never    Tobacco comments:     patient states she knows she nees to quit.   Substance and Sexual Activity    Alcohol use: Not Currently     Comment: quit 2014    Drug use: Not Currently     Frequency: 4.0 times per week     Types: Marijuana     Comment: Patient denies needle use, only inhalation of methampehtamines or orally.  Last known drug use was prior to admission to hospital stay for surgery    Sexual activity: Yes     Partners: Male     Birth control/protection: None     Review of Systems   Constitutional:  Positive for fatigue. Negative for chills and fever.   HENT:  Negative for congestion and sore throat.    Respiratory:  Negative for cough and shortness of breath.    Cardiovascular:  Negative for chest pain and leg swelling.   Gastrointestinal:  Negative for abdominal pain, diarrhea, nausea and vomiting.   Genitourinary:  Negative for dysuria and hematuria.   Musculoskeletal:  Positive for arthralgias, gait problem and myalgias.        Right arm pain    Skin:  Positive for wound. Negative for rash.   Neurological:  Negative for dizziness and headaches.   Psychiatric/Behavioral:  Positive for agitation. Negative for confusion.    Objective:     Vital Signs (Most Recent):  Temp: 98 °F (36.7 °C) (07/15/23 1123)  Pulse: 93 (07/15/23 1123)  Resp: 16 (07/15/23 1123)  BP: (!) 123/56 (07/15/23 1123)  SpO2: 98 % (07/15/23 1123)  Vital Signs (24h Range):  Temp:  [98 °F (36.7 °C)-99 °F (37.2 °C)] 98 °F (36.7 °C)  Pulse:  [86-96] 93  Resp:  [16-18] 16  SpO2:  [96 %-100 %] 98 %  BP: (107-128)/(56-66) 123/56     Weight: 118.9 kg (262 lb 2 oz)  Body mass index is 41.05 kg/m².     Physical Exam  Constitutional:       Appearance: Normal appearance.      Comments: Continued generalized pain     HENT:      Head: Normocephalic and atraumatic.   Cardiovascular:      Rate and Rhythm: Normal rate and regular rhythm.   Pulmonary:      Effort: Pulmonary effort is normal. No respiratory distress.   Abdominal:      General: Bowel sounds are normal. There is no distension.      Palpations: Abdomen is soft.      Tenderness: There is no abdominal tenderness.   Musculoskeletal:         General: Tenderness present.      Right lower leg: No edema.      Left lower leg: No edema.      Comments: RUE tenderness and decreased ROM  Worked with therapy and not expressing pain    Skin:     General: Skin is warm and dry.      Comments: Multiple wounds on  bilateral heels and sacral region    Neurological:      Mental Status: She is alert and oriented to person, place, and time. Mental status is at baseline.   Psychiatric:         Mood and Affect: Mood normal.         Behavior: Behavior normal.              Significant Labs: A1C: No results for input(s): HGBA1C in the last 4320 hours.  ABGs: No results for input(s): PH, PCO2, HCO3, POCSATURATED, BE, TOTALHB, COHB, METHB, O2HB, POCFIO2, PO2 in the last 48 hours.  Bilirubin:   Recent Labs   Lab 06/16/23  0519 07/06/23  2249 07/07/23  1133 07/08/23  0604 07/10/23  0559   BILITOT 2.4* 3.1* 3.2* 3.0* 2.4*       Blood Culture:   No results for input(s): LABBLOO in the last 48 hours.    BMP:   No results for input(s): GLU, NA, K, CL, CO2, BUN, CREATININE, CALCIUM, MG in the last 48 hours.    CBC:   No results for input(s): WBC, HGB, HCT, PLT in the last 48 hours.    CMP:   No results for input(s): NA, K, CL, CO2, GLU, BUN,  CREATININE, CALCIUM, PROT, ALBUMIN, BILITOT, ALKPHOS, AST, ALT, ANIONGAP, EGFRNONAA in the last 48 hours.    Invalid input(s): ESTGFAFRICA    Cardiac Markers:   No results for input(s): CKMB, MYOGLOBIN, BNP, TROPISTAT in the last 48 hours.    Coagulation:   No results for input(s): PT, INR, APTT in the last 48 hours.    Lactic Acid:   No results for input(s): LACTATE in the last 48 hours.    Lipase: No results for input(s): LIPASE in the last 48 hours.  Lipid Panel: No results for input(s): CHOL, HDL, LDLCALC, TRIG, CHOLHDL in the last 48 hours.  Magnesium:   No results for input(s): MG in the last 48 hours.    POCT Glucose: No results for input(s): POCTGLUCOSE in the last 48 hours.  Prealbumin: No results for input(s): PREALBUMIN in the last 48 hours.  Respiratory Culture: No results for input(s): GSRESP, RESPIRATORYC in the last 48 hours.  Troponin:   No results for input(s): TROPONINI, TROPONINIHS in the last 48 hours.    TSH: No results for input(s): TSH in the last 4320 hours.  Urine Culture: No results for input(s): LABURIN in the last 48 hours.  Urine Studies:   No results for input(s): COLORU, APPEARANCEUA, PHUR, SPECGRAV, PROTEINUA, GLUCUA, KETONESU, BILIRUBINUA, OCCULTUA, NITRITE, UROBILINOGEN, LEUKOCYTESUR, RBCUA, WBCUA, BACTERIA, SQUAMEPITHEL, HYALINECASTS in the last 48 hours.    Invalid input(s): DILLONSUR      Significant Imaging: I have reviewed all pertinent imaging results/findings within the past 24 hours.

## 2023-07-15 NOTE — ASSESSMENT & PLAN NOTE
Monitor AST and ALT  Ok to give her tylenol for pain, as long as she is getting less than 2g/day total     Picc line consult placed due to poor IV access; failed  Consult placed for central line

## 2023-07-15 NOTE — NURSING
Dr. Díaz notified regarding central line placement request. PICC placement was unsuccessful. Pt has no IV access. Pt is anemic and hypotensive.

## 2023-07-15 NOTE — ASSESSMENT & PLAN NOTE
Component of hypoalbuminemia and suspicion for underlying infection  U/A abnormal but unclear if this is source  Blood cultures pending. If negative at 48-72 hours and vitals remain stable I would feel comfortable with discharge   MAP stable in 70s  7/9---Will give her a 500ml bolus as her systolic dipped to 88 this am    1 unit of pRBC, decrease gabapentin     7/11 BP still hypotensive. 500cc bolus ordered.  D/c suboxone and tizanidine.  Decreased gabapentin.  Echo pending.  Cortisol and ACTH wnl.      7/12 Some improvement.  Holding all pain medications except gabapentin.     7/13 MRI c spine limited due to motion but no obvious signs of infection or abscess.  Started midodrine yesterday.  Will increase.  Patient has poor clinical prognosis.    7/14 Improving with midodrine      7/15 stable; monitor

## 2023-07-15 NOTE — ASSESSMENT & PLAN NOTE
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 This could cause some radicular pain radiating into the shoulder... she is on gabapentin  Her xray of shoulder shows that she has a high riding humeral head, concerning for a supraspinatus tear...she can address this as an outpt with an MRI      Titrate gabapentin as needed  Lidocaine patch

## 2023-07-15 NOTE — ASSESSMENT & PLAN NOTE
+ methamphetamine use on UDS  Cessation resources from CM at discharge   D/c suboxone for hypotension  Psych consulted for substance use disorder      Continue to defer opioid pain medications but pt requesting. BP does not tolerate and no IV access at this time. Gabapentin and antidepressant preferable for tx.  Start lidocaine patch

## 2023-07-15 NOTE — PROCEDURES
"Rachel Zazueta is a 42 y.o. female patient.    Temp: 97.8 °F (36.6 °C) (07/15/23 1653)  Pulse: 90 (07/15/23 1653)  Resp: 16 (07/15/23 1653)  BP: (!) 101/55 (07/15/23 1653)  SpO2: 99 % (07/15/23 1653)  Weight: 118.9 kg (262 lb 2 oz) (07/07/23 0109)  Height: 5' 7" (170.2 cm) (07/07/23 0109)       Central Line    Date/Time: 7/15/2023 4:58 PM  Performed by: Bjorn Díaz MD  Authorized by: Bjorn Díaz MD     Location procedure was performed:  [facility]  Pre-operative diagnosis:  Hypotension  Post-operative diagnosis:  Hypotension  Consent Done ?:  Yes  Time out complete?: Verified correct patient, procedure, equipment, staff, and site/side    Indications:  Med administration  Anesthesia:  Local infiltration  Local anesthetic:  Lidocaine 1% without epinephrine  Preparation:  Skin prepped with ChloraPrep  Skin prep agent dried: Skin prep agent completely dried prior to procedure    Sterile barriers: All five maximal sterile barriers used - gloves, gown, cap, mask and large sterile sheet    Hand hygiene: Hand hygiene performed immediately prior to central venous catheter insertion    Location:  Right internal jugular  Catheter type:  Triple lumen  Catheter size:  9 Fr  Inserted Catheter Length (cm):  16  Ultrasound guidance: Yes    Vessel Caliber:  Medium   patent  Comprressibility:  Normal  Needle advanced into vessel with real time ultrasound guidance.    Guidewire confirmed in vessel.    Image recorded and saved.    Steril sheath on probe.    Sterile gel used.  Manometry: No    Number of attempts:  1  Securement:  Line sutured, chlorhexidine patch, sterile dressing applied and blood return through all ports  Complications: No    Estimated blood loss (mL):  5  Specimens: No    Implants: No    XRay:  Placement verified by x-ray and successful placement  Adverse Events:  NoneTermination Site: superior vena cava    7/15/2023    "

## 2023-07-16 PROBLEM — K59.04 CHRONIC IDIOPATHIC CONSTIPATION: Status: ACTIVE | Noted: 2023-07-16

## 2023-07-16 LAB
ALBUMIN SERPL BCP-MCNC: 1.1 G/DL (ref 3.5–5.2)
ALP SERPL-CCNC: 184 U/L (ref 55–135)
ALT SERPL W/O P-5'-P-CCNC: 14 U/L (ref 10–44)
ANION GAP SERPL CALC-SCNC: 3 MMOL/L (ref 8–16)
AST SERPL-CCNC: 41 U/L (ref 10–40)
BASOPHILS # BLD AUTO: 0.02 K/UL (ref 0–0.2)
BASOPHILS NFR BLD: 0.6 % (ref 0–1.9)
BILIRUB SERPL-MCNC: 2.3 MG/DL (ref 0.1–1)
BUN SERPL-MCNC: 11 MG/DL (ref 6–20)
CALCIUM SERPL-MCNC: 7.3 MG/DL (ref 8.7–10.5)
CHLORIDE SERPL-SCNC: 105 MMOL/L (ref 95–110)
CO2 SERPL-SCNC: 26 MMOL/L (ref 23–29)
CREAT SERPL-MCNC: 0.5 MG/DL (ref 0.5–1.4)
DIFFERENTIAL METHOD: ABNORMAL
EOSINOPHIL # BLD AUTO: 0.1 K/UL (ref 0–0.5)
EOSINOPHIL NFR BLD: 1.9 % (ref 0–8)
ERYTHROCYTE [DISTWIDTH] IN BLOOD BY AUTOMATED COUNT: 20.1 % (ref 11.5–14.5)
EST. GFR  (NO RACE VARIABLE): >60 ML/MIN/1.73 M^2
GLUCOSE SERPL-MCNC: 98 MG/DL (ref 70–110)
HCT VFR BLD AUTO: 27 % (ref 37–48.5)
HGB BLD-MCNC: 8.2 G/DL (ref 12–16)
IMM GRANULOCYTES # BLD AUTO: 0.02 K/UL (ref 0–0.04)
IMM GRANULOCYTES NFR BLD AUTO: 0.6 % (ref 0–0.5)
LYMPHOCYTES # BLD AUTO: 0.6 K/UL (ref 1–4.8)
LYMPHOCYTES NFR BLD: 17.1 % (ref 18–48)
MCH RBC QN AUTO: 30.4 PG (ref 27–31)
MCHC RBC AUTO-ENTMCNC: 30.4 G/DL (ref 32–36)
MCV RBC AUTO: 100 FL (ref 82–98)
MONOCYTES # BLD AUTO: 0.2 K/UL (ref 0.3–1)
MONOCYTES NFR BLD: 7.5 % (ref 4–15)
NEUTROPHILS # BLD AUTO: 2.3 K/UL (ref 1.8–7.7)
NEUTROPHILS NFR BLD: 72.3 % (ref 38–73)
NRBC BLD-RTO: 0 /100 WBC
PLATELET # BLD AUTO: 64 K/UL (ref 150–450)
PMV BLD AUTO: 8.4 FL (ref 9.2–12.9)
POTASSIUM SERPL-SCNC: 4.3 MMOL/L (ref 3.5–5.1)
PROT SERPL-MCNC: 6.3 G/DL (ref 6–8.4)
RBC # BLD AUTO: 2.7 M/UL (ref 4–5.4)
SODIUM SERPL-SCNC: 134 MMOL/L (ref 136–145)
WBC # BLD AUTO: 3.22 K/UL (ref 3.9–12.7)

## 2023-07-16 PROCEDURE — 25000003 PHARM REV CODE 250: Performed by: PHYSICIAN ASSISTANT

## 2023-07-16 PROCEDURE — 25000003 PHARM REV CODE 250: Performed by: SURGERY

## 2023-07-16 PROCEDURE — 80053 COMPREHEN METABOLIC PANEL: CPT | Performed by: STUDENT IN AN ORGANIZED HEALTH CARE EDUCATION/TRAINING PROGRAM

## 2023-07-16 PROCEDURE — 99232 PR SUBSEQUENT HOSPITAL CARE,LEVL II: ICD-10-PCS | Mod: ,,, | Performed by: STUDENT IN AN ORGANIZED HEALTH CARE EDUCATION/TRAINING PROGRAM

## 2023-07-16 PROCEDURE — 25000003 PHARM REV CODE 250: Performed by: STUDENT IN AN ORGANIZED HEALTH CARE EDUCATION/TRAINING PROGRAM

## 2023-07-16 PROCEDURE — 27000207 HC ISOLATION

## 2023-07-16 PROCEDURE — 94761 N-INVAS EAR/PLS OXIMETRY MLT: CPT

## 2023-07-16 PROCEDURE — 21400001 HC TELEMETRY ROOM

## 2023-07-16 PROCEDURE — 85025 COMPLETE CBC W/AUTO DIFF WBC: CPT | Performed by: STUDENT IN AN ORGANIZED HEALTH CARE EDUCATION/TRAINING PROGRAM

## 2023-07-16 PROCEDURE — 99232 SBSQ HOSP IP/OBS MODERATE 35: CPT | Mod: ,,, | Performed by: STUDENT IN AN ORGANIZED HEALTH CARE EDUCATION/TRAINING PROGRAM

## 2023-07-16 PROCEDURE — 25000003 PHARM REV CODE 250: Performed by: FAMILY MEDICINE

## 2023-07-16 RX ORDER — LACTULOSE 10 G/15ML
20 SOLUTION ORAL 2 TIMES DAILY PRN
Status: DISCONTINUED | OUTPATIENT
Start: 2023-07-16 | End: 2023-07-21 | Stop reason: HOSPADM

## 2023-07-16 RX ADMIN — Medication 6 MG: at 09:07

## 2023-07-16 RX ADMIN — MIDODRINE HYDROCHLORIDE 5 MG: 2.5 TABLET ORAL at 02:07

## 2023-07-16 RX ADMIN — ACETAMINOPHEN 500 MG: 500 TABLET ORAL at 03:07

## 2023-07-16 RX ADMIN — SERTRALINE HYDROCHLORIDE 25 MG: 25 TABLET ORAL at 08:07

## 2023-07-16 RX ADMIN — HYDROCODONE BITARTRATE AND ACETAMINOPHEN 1 TABLET: 5; 325 TABLET ORAL at 06:07

## 2023-07-16 RX ADMIN — GABAPENTIN 200 MG: 100 CAPSULE ORAL at 09:07

## 2023-07-16 RX ADMIN — LIDOCAINE 1 PATCH: 50 PATCH CUTANEOUS at 02:07

## 2023-07-16 RX ADMIN — COLLAGENASE SANTYL: 250 OINTMENT TOPICAL at 09:07

## 2023-07-16 RX ADMIN — MIDODRINE HYDROCHLORIDE 5 MG: 2.5 TABLET ORAL at 09:07

## 2023-07-16 RX ADMIN — HYDROCODONE BITARTRATE AND ACETAMINOPHEN 1 TABLET: 5; 325 TABLET ORAL at 09:07

## 2023-07-16 RX ADMIN — ACETAMINOPHEN 500 MG: 500 TABLET ORAL at 07:07

## 2023-07-16 RX ADMIN — LACTULOSE 20 G: 20 SOLUTION ORAL at 02:07

## 2023-07-16 RX ADMIN — MIDODRINE HYDROCHLORIDE 5 MG: 2.5 TABLET ORAL at 08:07

## 2023-07-16 RX ADMIN — GABAPENTIN 200 MG: 100 CAPSULE ORAL at 02:07

## 2023-07-16 RX ADMIN — GABAPENTIN 200 MG: 100 CAPSULE ORAL at 08:07

## 2023-07-16 RX ADMIN — PANTOPRAZOLE SODIUM 40 MG: 40 TABLET, DELAYED RELEASE ORAL at 08:07
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
## 2023-07-16 NOTE — ASSESSMENT & PLAN NOTE
Lab Results   Component Value Date    ALBUMIN 1.1 (L) 07/16/2023       Dietician consult-- Continue Srinivasa BID per recs

## 2023-07-16 NOTE — SUBJECTIVE & OBJECTIVE
Past Medical History:   Diagnosis Date    Anemia     Anxiety     Depressed     Diskitis     Hep C w/o coma, chronic     IV drug abuse     Kidney stone     Liver cirrhosis        Past Surgical History:   Procedure Laterality Date    ARTHROTOMY OF SHOULDER Left 11/15/2022    Procedure: ARTHROTOMY, SHOULDER;  Surgeon: Bjorn Hayes MD;  Location: [facility] [place];  Service: Orthopedics;  Laterality: Left;  Left acromioclavicular joint arthrotomy    BACK SURGERY      benine tumor removal      forehead, age 9    CHOLECYSTECTOMY      KIDNEY SURGERY      LUMBAR FUSION Bilateral 3/2/2022    Procedure: FUSION, SPINE, LUMBAR;  Surgeon: Guille Templeton MD;  Location: [address];  Service: Neurosurgery;  Laterality: Bilateral;  AIRO  T10--Pelvis    LUMBAR FUSION N/A 1/24/2023    Procedure: **AIRO** T8-T12 POSTERIOR FUSION, T8-T10 LAMINECTOMY, HARDWARE REMOVAL AND WASHOUT;  Surgeon: Guille Templeton MD;  Location: [address];  Service: Neurosurgery;  Laterality: N/A;    REMOVAL OF HARDWARE FROM SPINE N/A 2/21/2022    Procedure: REMOVAL, HARDWARE, SPINE;  Surgeon: Guille Templeton MD;  Location: [address];  Service: Neurosurgery;  Laterality: N/A;  [place]    SPINE SURGERY      THORACIC LAMINECTOMY WITH FUSION N/A 2/2/2023    Procedure: LAMINECTOMY, SPINE, THORACIC, WITH FUSION (T4-Pelvis fusion w/ T9-10 corpectomies) **AIRO;  Surgeon: Guille Templeton MD;  Location: [address];  Service: Neurosurgery;  Laterality: N/A;  AIRO, 2 bovies, aquamantys, Globus, Depuy, antibiotic beads, TXA, Peroxide; [employer] plastics closure       Review of patient's allergies indicates:   Allergen Reactions    Bee venom protein (honey bee) Anaphylaxis     Patient reports she is allergic to bee stings.    Naproxen Anaphylaxis     Throat closing    12-23- Patient reports taking Ibuprofen 200 mg at home without problems. Verified X3. [place]    Wasp sting [allergen ext-venom-honey bee] Anaphylaxis    Adhesive Blisters    Shellfish containing products      Iodine and iodide containing products Hives and Itching     Allergic to iodine in seafood only    Nuts [tree nut] Rash       No current facility-administered medications on file prior to encounter.     Current Outpatient Medications on File Prior to Encounter   Medication Sig    bumetanide (BUMEX) 1 MG tablet Take 1 tablet (1 mg total) by mouth once daily.    buprenorphine-naloxone 8-2 mg (SUBOXONE) 8-2 mg Place under the tongue 2 (two) times a day.    DULoxetine (CYMBALTA) 60 MG capsule Take 60 mg by mouth once daily.    hydrOXYzine HCL (ATARAX) 25 MG tablet Take 1 tablet (25 mg total) by mouth 3 (three) times daily as needed for Anxiety.    hydrOXYzine pamoate (VISTARIL) 25 MG Cap Take 25 mg by mouth 2 (two) times daily as needed.    lactulose (CHRONULAC) 20 gram/30 mL Soln Take 30 mLs (20 g total) by mouth 3 (three) times daily.    melatonin (MELATIN) 3 mg tablet Take 2 tablets (6 mg total) by mouth nightly as needed for Insomnia.    pantoprazole (PROTONIX) 40 MG tablet Take 1 tablet (40 mg total) by mouth once daily.    spironolactone (ALDACTONE) 100 MG tablet Take 1 tablet (100 mg total) by mouth once daily.    tiZANidine (ZANAFLEX) 4 MG tablet Take 1 tablet (4 mg total) by mouth 3 (three) times daily as needed (Pain/muscle spasms).    acetaminophen (TYLENOL) 500 MG tablet Take 2 tablets (1,000 mg total) by mouth every 12 (twelve) hours. (Patient not taking: Reported on 6/22/2023)    calcium carbonate (TUMS) 200 mg calcium (500 mg) chewable tablet Take 2 tablets (1,000 mg total) by mouth 3 (three) times daily as needed.    folic acid (FOLVITE) 1 MG tablet Take 1 tablet (1 mg total) by mouth once daily. (Patient taking differently: Take 1 mg by mouth daily as needed (vitamin supplement).)    gabapentin (NEURONTIN) 300 MG capsule Take 3 capsules (900 mg total) by mouth 3 (three) times daily.    ondansetron (ZOFRAN) 4 MG tablet Take 8 mg by mouth 2 (two) times daily as needed for Nausea.    oxyCODONE  (ROXICODONE) 5 MG immediate release tablet Take 5 mg by mouth.    polyethylene glycol (GLYCOLAX) 17 gram PwPk Take 17 g by mouth daily as needed. (Patient not taking: Reported on 6/22/2023)    [DISCONTINUED] diclofenac sodium (VOLTAREN) 1 % Gel Apply 2 g topically 4 (four) times daily.     Family History       Problem Relation (Age of Onset)    Cirrhosis Father    Drug abuse Mother, Father    Hepatitis Mother, Father    Liver cancer Mother          Tobacco Use    Smoking status: Some Days     Packs/day: 0.50     Years: 23.00     Pack years: 11.50     Types: Cigarettes    Smokeless tobacco: Never    Tobacco comments:     patient states she knows she nees to quit.   Substance and Sexual Activity    Alcohol use: Not Currently     Comment: quit 2014    Drug use: Not Currently     Frequency: 4.0 times per week     Types: Marijuana     Comment: Patient denies needle use, only inhalation of methampehtamines or orally.  Last known drug use was prior to admission to hospital stay for surgery    Sexual activity: Yes     Partners: Male     Birth control/protection: None     Review of Systems   Constitutional:  Positive for fatigue. Negative for chills and fever.   HENT:  Negative for congestion and sore throat.    Respiratory:  Negative for cough and shortness of breath.    Cardiovascular:  Negative for chest pain and leg swelling.   Gastrointestinal:  Positive for constipation. Negative for abdominal pain, diarrhea, nausea and vomiting.   Genitourinary:  Negative for dysuria and hematuria.   Musculoskeletal:  Positive for arthralgias, gait problem and myalgias.        Right arm pain    Skin:  Positive for wound. Negative for rash.   Neurological:  Negative for dizziness and headaches.   Psychiatric/Behavioral:  Negative for agitation and confusion.    Objective:     Vital Signs (Most Recent):  Temp: 98.5 °F (36.9 °C) (07/16/23 0730)  Pulse: 96 (07/16/23 1001)  Resp: 16 (07/16/23 0730)  BP: 108/62 (07/16/23 0730)  SpO2: 100 %  (07/16/23 0730) Vital Signs (24h Range):  Temp:  [97.8 °F (36.6 °C)-98.5 °F (36.9 °C)] 98.5 °F (36.9 °C)  Pulse:  [] 96  Resp:  [16-17] 16  SpO2:  [95 %-100 %] 100 %  BP: (101-123)/(55-66) 108/62     Weight: 118.9 kg (262 lb 2 oz)  Body mass index is 41.05 kg/m².     Physical Exam  Constitutional:       Appearance: Normal appearance.      Comments: Continued generalized pain     HENT:      Head: Normocephalic and atraumatic.   Cardiovascular:      Rate and Rhythm: Normal rate and regular rhythm.   Pulmonary:      Effort: Pulmonary effort is normal. No respiratory distress.   Abdominal:      General: Bowel sounds are normal. There is no distension.      Palpations: Abdomen is soft.      Tenderness: There is no abdominal tenderness.   Musculoskeletal:         General: Tenderness present.      Right lower leg: No edema.      Left lower leg: No edema.      Comments: RUE tenderness and decreased ROM  Worked with therapy and not expressing pain   Texting on her phone with issue   Skin:     General: Skin is warm and dry.      Comments: Multiple wounds on  bilateral heels and sacral region    Neurological:      Mental Status: She is alert and oriented to person, place, and time. Mental status is at baseline.   Psychiatric:         Mood and Affect: Mood normal.         Behavior: Behavior normal.              Significant Labs: A1C: No results for input(s): HGBA1C in the last 4320 hours.  ABGs: No results for input(s): PH, PCO2, HCO3, POCSATURATED, BE, TOTALHB, COHB, METHB, O2HB, POCFIO2, PO2 in the last 48 hours.  Bilirubin:   Recent Labs   Lab 07/06/23  2249 07/07/23  1133 07/08/23  0604 07/10/23  0559 07/16/23  0613   BILITOT 3.1* 3.2* 3.0* 2.4* 2.3*       Blood Culture:   No results for input(s): LABBLOO in the last 48 hours.    BMP:   Recent Labs   Lab 07/16/23  0613   GLU 98   *   K 4.3      CO2 26   BUN 11   CREATININE 0.5   CALCIUM 7.3*       CBC:   Recent Labs   Lab 07/15/23  1725 07/16/23  0613    WBC 3.20* 3.22*   HGB 8.3* 8.2*   HCT 27.9* 27.0*   PLT 94* 64*       CMP:   Recent Labs   Lab 07/15/23  1725 07/16/23  0613   * 134*   K 4.1 4.3    105   CO2 25 26   GLU 94 98   BUN 12 11   CREATININE 0.4* 0.5   CALCIUM 7.3* 7.3*   PROT  --  6.3   ALBUMIN  --  1.1*   BILITOT  --  2.3*   ALKPHOS  --  184*   AST  --  41*   ALT  --  14   ANIONGAP 4* 3*       Cardiac Markers:   No results for input(s): CKMB, MYOGLOBIN, BNP, TROPISTAT in the last 48 hours.    Coagulation:   No results for input(s): PT, INR, APTT in the last 48 hours.    Lactic Acid:   No results for input(s): LACTATE in the last 48 hours.    Lipase: No results for input(s): LIPASE in the last 48 hours.  Lipid Panel: No results for input(s): CHOL, HDL, LDLCALC, TRIG, CHOLHDL in the last 48 hours.  Magnesium:   No results for input(s): MG in the last 48 hours.    POCT Glucose: No results for input(s): POCTGLUCOSE in the last 48 hours.  Prealbumin: No results for input(s): PREALBUMIN in the last 48 hours.  Respiratory Culture: No results for input(s): GSRESP, RESPIRATORYC in the last 48 hours.  Troponin:   No results for input(s): TROPONINI, TROPONINIHS in the last 48 hours.    TSH: No results for input(s): TSH in the last 4320 hours.  Urine Culture: No results for input(s): LABURIN in the last 48 hours.  Urine Studies:   No results for input(s): COLORU, APPEARANCEUA, PHUR, SPECGRAV, PROTEINUA, GLUCUA, KETONESU, BILIRUBINUA, OCCULTUA, NITRITE, UROBILINOGEN, LEUKOCYTESUR, RBCUA, WBCUA, BACTERIA, SQUAMEPITHEL, HYALINECASTS in the last 48 hours.    Invalid input(s): WRIGHTSUR      Significant Imaging: I have reviewed all pertinent imaging results/findings within the past 24 hours.

## 2023-07-16 NOTE — ASSESSMENT & PLAN NOTE
Monitor AST and ALT  Ok to give her tylenol for pain, as long as she is getting less than 2g/day total     Picc line consult placed due to poor IV access; failed  Central line placed.

## 2023-07-16 NOTE — PLAN OF CARE
Wound care provided. Central line maintained. Contact precautions maintained.     Problem: Impaired Wound Healing  Goal: Optimal Wound Healing  Outcome: Ongoing, Progressing     Problem: Bariatric Environmental Safety  Goal: Safety Maintained with Care  Outcome: Ongoing, Progressing     Problem: Skin Injury Risk Increased  Goal: Skin Health and Integrity  Outcome: Ongoing, Progressing

## 2023-07-17 PROCEDURE — 99900031 HC PATIENT EDUCATION (STAT)

## 2023-07-17 PROCEDURE — 99231 SBSQ HOSP IP/OBS SF/LOW 25: CPT | Mod: ,,, | Performed by: PHYSICIAN ASSISTANT

## 2023-07-17 PROCEDURE — 25000003 PHARM REV CODE 250: Performed by: FAMILY MEDICINE

## 2023-07-17 PROCEDURE — 99900035 HC TECH TIME PER 15 MIN (STAT)

## 2023-07-17 PROCEDURE — 25000003 PHARM REV CODE 250: Performed by: STUDENT IN AN ORGANIZED HEALTH CARE EDUCATION/TRAINING PROGRAM

## 2023-07-17 PROCEDURE — 97110 THERAPEUTIC EXERCISES: CPT

## 2023-07-17 PROCEDURE — 21400001 HC TELEMETRY ROOM

## 2023-07-17 PROCEDURE — 99231 PR SUBSEQUENT HOSPITAL CARE,LEVL I: ICD-10-PCS | Mod: ,,, | Performed by: PHYSICIAN ASSISTANT

## 2023-07-17 PROCEDURE — 97530 THERAPEUTIC ACTIVITIES: CPT

## 2023-07-17 PROCEDURE — 27000207 HC ISOLATION

## 2023-07-17 PROCEDURE — 25000003 PHARM REV CODE 250: Performed by: SURGERY

## 2023-07-17 PROCEDURE — 94761 N-INVAS EAR/PLS OXIMETRY MLT: CPT

## 2023-07-17 PROCEDURE — 25000003 PHARM REV CODE 250: Performed by: PHYSICIAN ASSISTANT

## 2023-07-17 RX ORDER — DIPHENHYDRAMINE HCL 25 MG
25 CAPSULE ORAL ONCE
Status: COMPLETED | OUTPATIENT
Start: 2023-07-17 | End: 2023-07-17

## 2023-07-17 RX ADMIN — MIDODRINE HYDROCHLORIDE 5 MG: 2.5 TABLET ORAL at 08:07

## 2023-07-17 RX ADMIN — SERTRALINE HYDROCHLORIDE 25 MG: 25 TABLET ORAL at 08:07

## 2023-07-17 RX ADMIN — PANTOPRAZOLE SODIUM 40 MG: 40 TABLET, DELAYED RELEASE ORAL at 08:07

## 2023-07-17 RX ADMIN — ACETAMINOPHEN 500 MG: 500 TABLET ORAL at 08:07

## 2023-07-17 RX ADMIN — GABAPENTIN 200 MG: 100 CAPSULE ORAL at 03:07

## 2023-07-17 RX ADMIN — DIPHENHYDRAMINE HYDROCHLORIDE 25 MG: 25 CAPSULE ORAL at 03:07

## 2023-07-17 RX ADMIN — Medication 6 MG: at 08:07

## 2023-07-17 RX ADMIN — COLLAGENASE SANTYL: 250 OINTMENT TOPICAL at 08:07

## 2023-07-17 RX ADMIN — MIDODRINE HYDROCHLORIDE 5 MG: 2.5 TABLET ORAL at 03:07

## 2023-07-17 RX ADMIN — LACTULOSE 20 G: 20 SOLUTION ORAL at 06:07

## 2023-07-17 RX ADMIN — LIDOCAINE 1 PATCH: 50 PATCH CUTANEOUS at 01:07

## 2023-07-17 RX ADMIN — GABAPENTIN 200 MG: 100 CAPSULE ORAL at 08:07

## 2023-07-17 RX ADMIN — ACETAMINOPHEN 500 MG: 500 TABLET ORAL at 05:07

## 2023-07-17 RX ADMIN — HYDROCODONE BITARTRATE AND ACETAMINOPHEN 1 TABLET: 5; 325 TABLET ORAL at 09:07

## 2023-07-17 NOTE — PT/OT/SLP PROGRESS
Occupational Therapy   Treatment    Name: Rachel Zazueta  MRN: [id number]  Admitting Diagnosis:  Debility       Recommendations:     Discharge Recommendations: LTACH (long-term acute care hospital), nursing facility, basic, nursing facility, skilled  Discharge Equipment Recommendations:  none  Barriers to discharge:  Inaccessible home environment, Decreased caregiver support    Assessment:     Rachel Zazueta is a 42 y.o. female with a medical diagnosis of Debility.  She presents with performance deficits affecting function are weakness, impaired endurance, gait instability, impaired functional mobility, impaired self care skills, decreased lower extremity function, decreased upper extremity function, pain, decreased ROM, edema.     Rehab Prognosis:  Fair; patient would benefit from acute skilled OT services to address these deficits and reach maximum level of function.       Plan:     Patient to be seen 5 x/week to address the above listed problems via self-care/home management, therapeutic activities, therapeutic exercises  Plan of Care Expires: 07/31/23  Plan of Care Reviewed with: patient    Subjective     Chief Complaint: My arm and legs still hurt.  Patient/Family Comments/goals: To no hurt so much.   Pain/Comfort:  Pain Rating 1: 7/10  Location - Side 1: Right  Location - Orientation 1: upper  Location 1: shoulder  Pain Addressed 1: Cessation of Activity, Reposition  Pain Rating Post-Intervention 1: 0/10    Objective:     Communicated with: Nursing prior to session.  Patient found HOB elevated with PICC line, telemetry, gu catheter upon OT entry to room.    General Precautions: Standard, fall, contact    Orthopedic Precautions:N/A  Braces: N/A  Respiratory Status: Room air     Occupational Performance:     Bed Mobility:    Pt refused due to having performed bed rolling with PT and nursing as well as having a stomach ache.     Therapeutic Exercises: (all completed within pain free range of  "motion)  Active assisted range of motion shoulder flexion: 3 x 10  Active range of motion elbow flexion/extension: 3 x 10  Active range of motion wrist flexion/extension: 3 x 10  Active assisted range of motion pronation/supination: 3 x 10  Active range of motion internal rotation and external rotation: 3 x 10   squeezes: 3 x 10 squeezing on therapist fingers.      [facility] 6 Click ADL: 11    Treatment & Education:  Pt completed therapeutic exercises at bed level for upper extremity in all planes. Pt instructed to complete upper extremity range of motion exercises daily, 3x per day within pain free range to prevent stiffness and weakness in right upper extremity. Pt refused bed mobility due to bilateral LE pain, shoulder pain, and stating that "I already moved in bed with Pt and nursing today".    Patient left HOB elevated with all lines intact, call button in reach, bed alarm on, and nursing notified    GOALS:   Multidisciplinary Problems       Occupational Therapy Goals          Problem: Occupational Therapy    Goal Priority Disciplines Outcome Interventions   Occupational Therapy Goal     OT, PT/OT Ongoing, Progressing    Description: Pt to perform UB dressing with MIN A seated EOB.   Pt to consistently demonstrate adherence to orthopedic precautions during all ADL's as instructed by OT.  Pt to demonstrate good dynamic and static seated balance as required to perform ADL's from seated level.  Pt to participate in upper extremity range of motion exercise program as educated by OT for maintenance or shoulder range of motion.  Pt to participate in EOB seated ADL for ~5 minutes for increased safety and activity tolerance upon discharge.                         Time Tracking:     OT Date of Treatment:    OT Start Time: 1312  OT Stop Time: 1333  OT Total Time (min): 21 min    Billable Minutes:Therapeutic Exercise 10    OT/ROSS: OT     Number of ROSS visits since last OT visit: 0    7/17/2023  "
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
## 2023-07-17 NOTE — PLAN OF CARE
07/17/23 1409   Post-Acute Status   Post-Acute Authorization Placement   Post-Acute Placement Status Referrals Sent   Discharge Delays (!) Post-Acute Set-up         Patient remains awaiting SNF placement. Referrals have been sent to the following facilities:     [facility]--Interested   [facility]--Declined (Medicaid max and unable to take wounds)   [facility]--Declined (Care Needs Exceed Current Capacity)    [facility]--Declined (Out of Network)  [facility]--Declined (Care Needs Exceed Current Capacity)   [facility]--Declined (Out of Network)   [facility]--Declined (Insufficient Funding)    [facility]--Referral Received    Eduar VARGAS received message this morning that [facility] is interested in accepting patient for placement pending financial information. They did inquire if patient would be accepting of placement in [place]. ALICIA went to discuss with patient. She states that she is willing to be placed in [place] as she has friends in [place]. Although she is willing, she did ask that SW inquire with [facility] for an update on their review. She states that [facility] had contacted her but when she returned call she was unable to speak with anyone. ALICIA did place a call to [facility]. Spoke with Esther in Admissions. She states that she will review patient's referral and contact  with a determination. All information above relayed to Ila with [facility].     Remain awaiting 142 from the Office of Aging and Adult Services after Level II Screen by the Office of Behavioral Health.

## 2023-07-17 NOTE — PLAN OF CARE
Patient has rested quietly this shift. Dressings to left greater trochanter, right calf, and bilateral heels changed this shift. Patient has been on the bedpan several times this shift but has not had a BM just gas.    Problem: Infection  Goal: Absence of Infection Signs and Symptoms  Outcome: Ongoing, Progressing     Problem: Adult Inpatient Plan of Care  Goal: Plan of Care Review  Outcome: Ongoing, Progressing     Problem: Bariatric Environmental Safety  Goal: Safety Maintained with Care  Outcome: Ongoing, Progressing     Problem: Impaired Wound Healing  Goal: Optimal Wound Healing  Outcome: Ongoing, Progressing     Problem: Skin Injury Risk Increased  Goal: Skin Health and Integrity  Outcome: Ongoing, Progressing     Problem: Fall Injury Risk  Goal: Absence of Fall and Fall-Related Injury  Outcome: Ongoing, Progressing     Problem: Pain Acute  Goal: Acceptable Pain Control and Functional Ability  Outcome: Ongoing, Progressing

## 2023-07-17 NOTE — SUBJECTIVE & OBJECTIVE
[facility] Daily Progress Note      Subjective and Objective  -pt is seen and examined this morning  Denies cp, sob    Levo overnight, weaned off  On Milrinone 0.125    10 point review of systems is negative except otherwise as stated above       Reviewed the labs personally       Arterial Line MAP 2 (mmHg)  Av.4 mmHg  Min: 65 mmHg  Max: 73 mmHg  MAP (mmHg)  Av.6  Min: 56  Max: 87        PAP (mmHg): (17-69)/(5-31) 48/24  CVP:  [3 mmHg-6 mmHg] 5 mmHg  CO:  [4.9 l/min-5.7 l/min] 5.3 l/min  CI:  [2.1 l/min/m2-2.4 l/min/m2] 2.2 l/min/m2    Intake/Output Summary (Last 24 hours) at 2022  Last data filed at 2022 07  Gross per 24 hour   Intake 1199.5 ml   Output 4225 ml   Net -3025.5 ml        Vital Last Value 24 Hour Range   Temperature 97.2 °F (36.2 °C) (22 0333) Temp  Min: 97.2 °F (36.2 °C)  Max: 98.8 °F (37.1 °C)   Pulse 86 (22) Pulse  Min: 79  Max: 153   Respiratory 20 (22) Resp  Min: 13  Max: 24   Non-Invasive  Blood Pressure 93/58 (22) BP  Min: 83/57  Max: 113/74   Pulse Oximetry 99 % (22) SpO2  Min: 97 %  Max: 100 %   Arterial   Blood Pressure   No data recorded      Physical Exam:  General:  Alert and oriented.  No acute distress.    Head:  Normocephalic   Neck:  Trachea is midline. No thyromegaly, no lymphadenopathy.   Eyes:  no scleral icterus,  normal conjunctivae   ENT:   Mucous membranes are moist.    Cardiovascular: normal heart sounds,  Regular rate and rhythm, no murmur.  Respiratory:  Clear to auscultation.  Gastrointestinal:  Soft and nontender.  Normal bowel sounds.  No hepatosplenomegaly   Genitourinary:no suprapubic tenderness, no flank tenderness   Skin:  no rash  Musculoskeletal:  No tenderness to palpation.    Extremities: no edema le   Back:  No spine tenderness.  Neurologic: alert and oriented,  no  focal deficits   Psychiatric:   Cooperative.  Appropriate mood and affect.  Normal judgment.      Labs:  Recent Labs      Past Medical History:   Diagnosis Date    Anemia     Anxiety     Depressed     Diskitis     Hep C w/o coma, chronic     IV drug abuse     Kidney stone     Liver cirrhosis        Past Surgical History:   Procedure Laterality Date    ARTHROTOMY OF SHOULDER Left 11/15/2022    Procedure: ARTHROTOMY, SHOULDER;  Surgeon: Bjorn Hayes MD;  Location: [facility];  Service: Orthopedics;  Laterality: Left;  Left acromioclavicular joint arthrotomy    BACK SURGERY      benine tumor removal      forehead, age 9    CHOLECYSTECTOMY      KIDNEY SURGERY      LUMBAR FUSION Bilateral 3/2/2022    Procedure: FUSION, SPINE, LUMBAR;  Surgeon: Guille Templeton MD;  Location: [address];  Service: Neurosurgery;  Laterality: Bilateral;  AIRO  T10--Pelvis    LUMBAR FUSION N/A 1/24/2023    Procedure: **AIRO** T8-T12 POSTERIOR FUSION, T8-T10 LAMINECTOMY, HARDWARE REMOVAL AND WASHOUT;  Surgeon: Guille Templeton MD;  Location: [address];  Service: Neurosurgery;  Laterality: N/A;    REMOVAL OF HARDWARE FROM SPINE N/A 2/21/2022    Procedure: REMOVAL, HARDWARE, SPINE;  Surgeon: Guille Templeton MD;  Location: [address];  Service: Neurosurgery;  Laterality: N/A;  [place]    SPINE SURGERY      THORACIC LAMINECTOMY WITH FUSION N/A 2/2/2023    Procedure: LAMINECTOMY, SPINE, THORACIC, WITH FUSION (T4-Pelvis fusion w/ T9-10 corpectomies) **AIRO;  Surgeon: Guille Templeton MD;  Location: [address];  Service: Neurosurgery;  Laterality: N/A;  AIRO, 2 bovies, aquamantys, Globus, Depuy, antibiotic beads, TXA, Peroxide; [employer] plastics closure       Review of patient's allergies indicates:   Allergen Reactions    Bee venom protein (honey bee) Anaphylaxis     Patient reports she is allergic to bee stings.    Naproxen Anaphylaxis     Throat closing    12-23- Patient reports taking Ibuprofen 200 mg at home without problems. Verified X3. [place]    Wasp sting [allergen ext-venom-honey bee] Anaphylaxis    Adhesive Blisters    Shellfish containing products      Iodine and iodide containing products Hives and Itching     Allergic to iodine in seafood only    Nuts [tree nut] Rash       No current facility-administered medications on file prior to encounter.     Current Outpatient Medications on File Prior to Encounter   Medication Sig    bumetanide (BUMEX) 1 MG tablet Take 1 tablet (1 mg total) by mouth once daily.    buprenorphine-naloxone 8-2 mg (SUBOXONE) 8-2 mg Place under the tongue 2 (two) times a day.    DULoxetine (CYMBALTA) 60 MG capsule Take 60 mg by mouth once daily.    hydrOXYzine HCL (ATARAX) 25 MG tablet Take 1 tablet (25 mg total) by mouth 3 (three) times daily as needed for Anxiety.    hydrOXYzine pamoate (VISTARIL) 25 MG Cap Take 25 mg by mouth 2 (two) times daily as needed.    lactulose (CHRONULAC) 20 gram/30 mL Soln Take 30 mLs (20 g total) by mouth 3 (three) times daily.    melatonin (MELATIN) 3 mg tablet Take 2 tablets (6 mg total) by mouth nightly as needed for Insomnia.    pantoprazole (PROTONIX) 40 MG tablet Take 1 tablet (40 mg total) by mouth once daily.    spironolactone (ALDACTONE) 100 MG tablet Take 1 tablet (100 mg total) by mouth once daily.    tiZANidine (ZANAFLEX) 4 MG tablet Take 1 tablet (4 mg total) by mouth 3 (three) times daily as needed (Pain/muscle spasms).    acetaminophen (TYLENOL) 500 MG tablet Take 2 tablets (1,000 mg total) by mouth every 12 (twelve) hours. (Patient not taking: Reported on 6/22/2023)    calcium carbonate (TUMS) 200 mg calcium (500 mg) chewable tablet Take 2 tablets (1,000 mg total) by mouth 3 (three) times daily as needed.    folic acid (FOLVITE) 1 MG tablet Take 1 tablet (1 mg total) by mouth once daily. (Patient taking differently: Take 1 mg by mouth daily as needed (vitamin supplement).)    gabapentin (NEURONTIN) 300 MG capsule Take 3 capsules (900 mg total) by mouth 3 (three) times daily.    ondansetron (ZOFRAN) 4 MG tablet Take 8 mg by mouth 2 (two) times daily as needed for Nausea.    oxyCODONE  11/14/22  0423 11/15/22  0326 11/16/22  0322   WBC 7.8 10.6 8.7   RBC 5.73 5.77 5.82   HGB 14.3 14.5 14.4   HCT 44.4 44.4 44.6    178 214   MCV 77.5* 76.9* 76.6*   MCH 25.0* 25.1* 24.7*   MCHC 32.2 32.7 32.3   NRBCRE 0 0 0         Recent Labs     11/14/22 0423 11/14/22  2319 11/15/22  0326 11/15/22  1455 11/16/22 0322   SODIUM 136  --  134* 134* 134*   POTASSIUM 3.8   < > 3.9 4.1 3.8   MG 2.4   < > 2.4 2.3 2.4   PHOS  --   --   --  4.1  --    CO2 24  --  25 25 26   ANIONGAP 9  --  11 12 9   GLUCOSE 121*  --  128* 119* 119*   BUN 15  --  21* 19 18   CREATININE 0.87  --  1.02 1.01 0.86   BCRAT 17  --  21 19 21   CALCIUM 8.8  --  9.1 8.6 8.9   BILIRUBIN 0.8  --  0.6  --  0.6   AST 43*  --  82*  --  73*   GPT 80*  --  127*  --  123*   ALKPT 129*  --  131*  --  129*   GLOB 3.1  --  3.1  --  2.7   AGR 1.1  --  1.1  --  1.3    < > = values in this interval not displayed.        Recent Labs   Lab 11/16/22 0322   NTPROB 4,053*        No results for input(s): INR, PT, PTT in the last 72 hours.        Inpatient Medications:  Current Facility-Administered Medications   Medication Dose Route Frequency Provider Last Rate Last Admin   • furosemide (LASIX INJECT) injection 40 mg  40 mg Intravenous Daily Derick Espana MD       • ergocalciferol (DRISDOL) 200 mcg (8,000 units)/mL oral solution 1,260 mcg  1.25 mg Oral Once per day on Fri Juan De La Torre MD   1,260 mcg at 11/11/22 1750   • ferrous sulfate (65 mg Fe per 325 mg) tablet 325 mg  325 mg Oral Once per day on Mon Wed Fri Coleen Trotter MD   325 mg at 11/14/22 0900   • Potassium Standard Replacement Protocol (Levels 3.5 and lower)   Does not apply See Admin Instructions TREVER Restrepo       • Potassium Replacement (Levels 3.6 - 4)   Does not apply See Admin Instructions TREVER Restrepo       • Magnesium Standard Replacement Protocol   Does not apply See Admin Instructions TREVER Restrepo       • spironolactone (ALDACTONE) tablet 25  (ROXICODONE) 5 MG immediate release tablet Take 5 mg by mouth.    polyethylene glycol (GLYCOLAX) 17 gram PwPk Take 17 g by mouth daily as needed. (Patient not taking: Reported on 6/22/2023)    [DISCONTINUED] diclofenac sodium (VOLTAREN) 1 % Gel Apply 2 g topically 4 (four) times daily.     Family History       Problem Relation (Age of Onset)    Cirrhosis Father    Drug abuse Mother, Father    Hepatitis Mother, Father    Liver cancer Mother          Tobacco Use    Smoking status: Some Days     Packs/day: 0.50     Years: 23.00     Pack years: 11.50     Types: Cigarettes    Smokeless tobacco: Never    Tobacco comments:     patient states she knows she nees to quit.   Substance and Sexual Activity    Alcohol use: Not Currently     Comment: quit 2014    Drug use: Not Currently     Frequency: 4.0 times per week     Types: Marijuana     Comment: Patient denies needle use, only inhalation of methampehtamines or orally.  Last known drug use was prior to admission to hospital stay for surgery    Sexual activity: Yes     Partners: Male     Birth control/protection: None     Review of Systems   Constitutional:  Positive for fatigue. Negative for chills and fever.   HENT:  Negative for congestion and sore throat.    Respiratory:  Negative for cough and shortness of breath.    Cardiovascular:  Negative for chest pain and leg swelling.   Gastrointestinal:  Positive for constipation. Negative for abdominal pain, diarrhea, nausea and vomiting.   Genitourinary:  Negative for dysuria and hematuria.   Musculoskeletal:  Positive for arthralgias, gait problem and myalgias.        Right arm pain    Skin:  Positive for wound. Negative for rash.   Neurological:  Negative for dizziness and headaches.   Psychiatric/Behavioral:  Negative for agitation and confusion.    Objective:     Vital Signs (Most Recent):  Temp: 98.4 °F (36.9 °C) (07/17/23 1114)  Pulse: 97 (07/17/23 1200)  Resp: 20 (07/17/23 1114)  BP: 105/64 (07/17/23 1114)  SpO2: 97 %  mg  25 mg Oral Daily Shayne Diaz MD   25 mg at 11/15/22 0847   • sodium chloride (PF) 0.9 % injection 2 mL  2 mL Intracatheter 2 times per day Richard Chegn MD   2 mL at 11/15/22 2002   • heparin (porcine) injection 5,000 Units  5,000 Units Subcutaneous 3 times per day Yang Ness MD   5,000 Units at 11/16/22 0500   • aspirin (ECOTRIN) enteric coated tablet 81 mg  81 mg Oral Daily Luke Guillory MD   81 mg at 11/15/22 0847   • [Held by provider] carvedilol (COREG) tablet 3.125 mg  3.125 mg Oral BID WC Luke Guillory MD   3.125 mg at 11/10/22 1839   • pantoprazole (PROTONIX) EC tablet 40 mg  40 mg Oral QAM AC Yang Ness MD   40 mg at 11/16/22 0500      Current Facility-Administered Medications   Medication Dose Route Frequency Provider Last Rate Last Admin   • vasopressin (VASOSTRICT) 20 unit/100 mL dextrose 5 % infusion  0-0.04 Units/min Intravenous Continuous Eladia Saldanaanek, APNP       • NORepinephrine (LEVOPHED) 8 mg/250 mL in dextrose 5 % infusion  0-100 mcg/min Intravenous Continuous Eladia Vihnanek, APNP 3.75 mL/hr at 11/16/22 0700 2 mcg/min at 11/16/22 0700   • milrinone (PRIMACOR) 20 mg/100 mL in dextrose 5 % infusion premix  0.125 mcg/kg/min (Dosing Weight) Intravenous Continuous Abbe Hernandez MD 4.3 mL/hr at 11/16/22 0700 0.125 mcg/kg/min at 11/16/22 0700   • sodium chloride 0.9% infusion   Intravenous Continuous Eladia Vihnanek, APNP 10 mL/hr at 11/13/22 2000 Rate Verify at 11/13/22 2000   • sodium chloride 0.9% infusion   Intravenous Continuous Eladia Vihnanek, APNP 10 mL/hr at 11/13/22 2001 Rate Verify at 11/13/22 2001   • dextrose 5 % infusion   Intravenous Continuous Eladia Vihnanek, APNP 40 mL/hr at 11/10/22 0003 New Bag at 11/10/22 0003   • sodium chloride 0.9% infusion   Intravenous Continuous SHELLY RestrepoNP 6 mL/hr at 11/12/22 0100 Rate Verify at 11/12/22 0100      Current Facility-Administered Medications   Medication Dose Route Frequency Provider Last Rate Last  (07/17/23 1114) Vital Signs (24h Range):  Temp:  [98.4 °F (36.9 °C)-98.8 °F (37.1 °C)] 98.4 °F (36.9 °C)  Pulse:  [] 97  Resp:  [14-20] 20  SpO2:  [97 %-99 %] 97 %  BP: (105-119)/(55-72) 105/64     Weight: 118.9 kg (262 lb 2 oz)  Body mass index is 41.05 kg/m².     Physical Exam  Constitutional:       Appearance: Normal appearance.      Comments: Continued generalized pain     HENT:      Head: Normocephalic and atraumatic.   Cardiovascular:      Rate and Rhythm: Normal rate and regular rhythm.   Pulmonary:      Effort: Pulmonary effort is normal. No respiratory distress.   Abdominal:      General: Bowel sounds are normal. There is no distension.      Palpations: Abdomen is soft.      Tenderness: There is no abdominal tenderness.   Musculoskeletal:         General: Tenderness present.      Right lower leg: No edema.      Left lower leg: No edema.      Comments: RUE tenderness and decreased ROM  Worked with therapy and not expressing pain   Texting on her phone with issue   Skin:     General: Skin is warm and dry.      Comments: Multiple wounds on  bilateral heels and sacral region   Erythema of right face.  Will monitor    Neurological:      Mental Status: She is alert and oriented to person, place, and time. Mental status is at baseline.   Psychiatric:         Mood and Affect: Mood normal.         Behavior: Behavior normal.              Significant Labs: A1C: No results for input(s): HGBA1C in the last 4320 hours.  ABGs: No results for input(s): PH, PCO2, HCO3, POCSATURATED, BE, TOTALHB, COHB, METHB, O2HB, POCFIO2, PO2 in the last 48 hours.  Bilirubin:   Recent Labs   Lab 07/06/23  2249 07/07/23  1133 07/08/23  0604 07/10/23  0559 07/16/23  0613   BILITOT 3.1* 3.2* 3.0* 2.4* 2.3*       Blood Culture:   No results for input(s): LABBLOO in the last 48 hours.    BMP:   Recent Labs   Lab 07/16/23  0613   GLU 98   *   K 4.3      CO2 26   BUN 11   CREATININE 0.5   CALCIUM 7.3*       CBC:   Recent Labs  Admin   • sodium chloride 0.9 % flush bag 25 mL  25 mL Intravenous PRN TREVER Restrepo       • ondansetron (ZOFRAN ODT) disintegrating tablet 4 mg  4 mg Oral Q12H PRN Richard Cheng MD        Or   • ondansetron (ZOFRAN) injection 4 mg  4 mg Intravenous Q12H PRN Richard Cheng MD       • acetaminophen (TYLENOL) tablet 650 mg  650 mg Oral Q4H PRN Richard Cheng MD   650 mg at 11/10/22 0530    Or   • acetaminophen (TYLENOL) suppository 650 mg  650 mg Rectal Q4H PRN Richard Cheng MD       • sodium chloride 0.9 % flush bag 25 mL  25 mL Intravenous PRN Richard Cheng MD            Assessment and Plan:      Mr Williamson is a 33yo M w/ hx of bicuspid AV s/p repair (age8) and bioprosthetic AVR x2 at age 12 and 17 (4/4/22 TTE LVEF 47%, Bioprostheic AVR w/ Mod AR/AS, Mod MR), recent of aortic valve bioprosthetic dysfunction w/ severe AI, moderate to severe MR, severe TR and HFrEF who p/w SOB.     #Acute on chronic systolic heart failure  #Hx of bicuspid AV s/p repair (age8) and bioprosthetic AVR x2 at age 12 and 17    #Aortic valve bioprosthetic dysfunction w/ severe AI, moderate to severe MR, severe TR   - NT-probnp 9k  - VIANEY by cardiology  - [facility] on 11/9 - Elevated RA pressure, Elevated PA pressure, Elevated PCW pressure, Low cardiac output, cardiac index and mixed venous oxygen saturation, Elevated Pulmonary vascular resistance  - Diuresis: started on lasix 40mg IV QD  - Inotropes: Mil 0.125  - GDMT: - coreg on hold, aldactone  - pressors: off levo   -SGLT2i: started on empagliflozin   - on asa  - per AHF team,- Leaning towards OHT with LVAD as back-up as valvular intervention alone would be high risk given the degree of biventricular dysfunction. Would also favor OHT as RV dysfunction could make LVAD a higher risk intervention. May not be IABP candidate d/t severe AI but may be able to submit exception for listing status\"  - monitor electrolytes and replete prn  - LVAD/OHT workup initiated, appreciate recs  -CTA TAVR    Lab 07/15/23  1725 07/16/23  0613   WBC 3.20* 3.22*   HGB 8.3* 8.2*   HCT 27.9* 27.0*   PLT 94* 64*       CMP:   Recent Labs   Lab 07/15/23  1725 07/16/23 0613   * 134*   K 4.1 4.3    105   CO2 25 26   GLU 94 98   BUN 12 11   CREATININE 0.4* 0.5   CALCIUM 7.3* 7.3*   PROT  --  6.3   ALBUMIN  --  1.1*   BILITOT  --  2.3*   ALKPHOS  --  184*   AST  --  41*   ALT  --  14   ANIONGAP 4* 3*       Cardiac Markers:   No results for input(s): CKMB, MYOGLOBIN, BNP, TROPISTAT in the last 48 hours.    Coagulation:   No results for input(s): PT, INR, APTT in the last 48 hours.    Lactic Acid:   No results for input(s): LACTATE in the last 48 hours.    Lipase: No results for input(s): LIPASE in the last 48 hours.  Lipid Panel: No results for input(s): CHOL, HDL, LDLCALC, TRIG, CHOLHDL in the last 48 hours.  Magnesium:   No results for input(s): MG in the last 48 hours.    POCT Glucose: No results for input(s): POCTGLUCOSE in the last 48 hours.  Prealbumin: No results for input(s): PREALBUMIN in the last 48 hours.  Respiratory Culture: No results for input(s): GSRESP, RESPIRATORYC in the last 48 hours.  Troponin:   No results for input(s): TROPONINI, TROPONINIHS in the last 48 hours.    TSH: No results for input(s): TSH in the last 4320 hours.  Urine Culture: No results for input(s): LABURIN in the last 48 hours.  Urine Studies:   No results for input(s): COLORU, APPEARANCEUA, PHUR, SPECGRAV, PROTEINUA, GLUCUA, KETONESU, BILIRUBINUA, OCCULTUA, NITRITE, UROBILINOGEN, LEUKOCYTESUR, RBCUA, WBCUA, BACTERIA, SQUAMEPITHEL, HYALINECASTS in the last 48 hours.    Invalid input(s): WRIGHTSUR      Significant Imaging: I have reviewed all pertinent imaging results/findings within the past 24 hours.   protocol done 11/16, results reviewed   -CV surgery consult: Have discussed potential interventions including SAVR vs TAVR including potential risks/potential STS risks as well as medical therapy., STS Mortality risk:1%, STS morbidity/mortality risk:12%%  - mgmt per AHF team      #n/v, intermitt - improved, suspect 2/2 right sided heart failure  - 2/2 primary GI issue and/or 2/2 worsening heart failure  - started PPI  - GI on consult, appreciate recs     #Vitamin D def  - started on vitamin D  - endo following, appreciate recs     Mild iron deficiency anemia  - started on Iron  - heme following     DVT ppx - hsq tid        Primary Care Physician  No Pcp     Code Status    Code Status: Full Resuscitation           Time spent 25 minutes   Greater than 50% of the time spent reviewing the patient records, coordinating patient care plan and discussing the above care plan  with the patient.  Reviewed all vitals, medications, new orders, I/O, labs, micro, radiology, nurses notes, pertinent consultant notes which are reflected in assessment and plan     Patient Privacy Notice:  The 21st Century Cures Act makes medical notes like these available to patients in the interest of transparency. Please be advised that this is a medical document. Medical documents are intended to carry relevant information, facts as evident, and the clinical opinion of the practitioner. The medical note is intended as peer to peer communication, and may appear blunt or direct. It is written in medical language, and may contain abbreviations or verbiage that are unfamiliar.    Maru Romero MD  11/16/2022 7:19 AM

## 2023-07-17 NOTE — PT/OT/SLP PROGRESS
Physical Therapy Treatment    Patient Name:  Rachel Zazueta   MRN:  [id number]    Recommendations:     Discharge Recommendations: LTACH (long-term acute care hospital), nursing facility, basic, nursing facility, skilled  Discharge Equipment Recommendations: none  Barriers to discharge: Inaccessible home and Decreased caregiver support    Assessment:     Rachel Zazueta is a 42 y.o. female admitted with a medical diagnosis of Debility.  She presents with the following impairments/functional limitations: weakness, impaired endurance, impaired self care skills, impaired functional mobility, gait instability, impaired balance, decreased upper extremity function, decreased lower extremity function, pain, decreased safety awareness, impaired skin, edema, decreased ROM. Patient tolerated AAROM - AROM ex on bilateral LE and rolling to sides using bed rail with max/moderate assistance and cues.    Rehab Prognosis: Fair; patient would benefit from acute skilled PT services to address these deficits and reach maximum level of function.    Recent Surgery: * No surgery found *      Plan:     During this hospitalization, patient to be seen 5 x/week to address the identified rehab impairments via therapeutic activities, therapeutic exercises and progress toward the following goals:    Plan of Care Expires:  07/21/23    Subjective     Chief Complaint: right leg and right shoulder pain  Patient/Family Comments/goals: none stated  Pain/Comfort:  Pain Rating 1: 8/10  Location - Side 1: Right  Location - Orientation 1: lower  Location 1: leg  Pain Addressed 1: Cessation of Activity, Reposition  Pain Rating Post-Intervention 1:  (did not rate)  Pain Rating 2:  (did not rate)  Location - Side 2: Right  Location - Orientation 2: upper  Location 2: shoulder  Pain Addressed 2: Cessation of Activity  Pain Rating Post-Intervention 2:  (did not rate)      Objective:     Communicated with patient  prior to session.  Patient found HOB  elevated with PICC line, telemetry, gu catheter upon PT entry to room.     General Precautions: Standard, fall, contact  Orthopedic Precautions: N/A  Braces: N/A  Respiratory Status: Room air     Functional Mobility:  Bed Mobility:     Rolling Left:  maximal assistance  Rolling Right: moderate assistance and maximal assistance      AM-PAC 6 CLICK MOBILITY  Turning over in bed (including adjusting bedclothes, sheets and blankets)?: 2  Sitting down on and standing up from a chair with arms (e.g., wheelchair, bedside commode, etc.): 1  Moving from lying on back to sitting on the side of the bed?: 1  Moving to and from a bed to a chair (including a wheelchair)?: 1  Need to walk in hospital room?: 1  Climbing 3-5 steps with a railing?: 1  Basic Mobility Total Score: 7       Treatment & Education:  Performed AAROM - AROM ex on B LE x 10 reps on each possible plane at supine. Rolling to sides x 5 reps (2 sets) using bed rail with max.moderate assistance; Repositioned patient using pillows.    Patient left HOB elevated with all lines intact, call button in reach, bed alarm on, and nursing notified..    GOALS:   Multidisciplinary Problems       Physical Therapy Goals          Problem: Physical Therapy    Goal Priority Disciplines Outcome Goal Variances Interventions   Physical Therapy Goal     PT, PT/OT Ongoing, Not Progressing     Description:   Patient will increase functional independence with mobility by performin. Pt able to perform and tolerate B LE ROM ex x 10 reps on each plane to inc mobility, inc body repositioning  and prevent in declining in functions.  2. Inc rolling to sides to minimal assistance and cues  3. Able to perform and tolerate  supine <>sit with Moderate Assistance and cues.  4. Able to perform and tolerate static sit at edge of the bed x ~10 minutes for inc participation and performance with self care activities.  5. Establish home ex program.                          Time Tracking:     PT  Received On: 07/17/23  PT Start Time: 1043     PT Stop Time: 1058  PT Total Time (min): 15 min     Billable Minutes: Therapeutic Activity 15    Treatment Type: Treatment  PT/PTA: PT           07/17/2023

## 2023-07-18 PROCEDURE — 97110 THERAPEUTIC EXERCISES: CPT | Mod: CO

## 2023-07-18 PROCEDURE — 99900031 HC PATIENT EDUCATION (STAT)

## 2023-07-18 PROCEDURE — 94761 N-INVAS EAR/PLS OXIMETRY MLT: CPT

## 2023-07-18 PROCEDURE — 25000003 PHARM REV CODE 250: Performed by: SURGERY

## 2023-07-18 PROCEDURE — 99900035 HC TECH TIME PER 15 MIN (STAT)

## 2023-07-18 PROCEDURE — 25000003 PHARM REV CODE 250: Performed by: STUDENT IN AN ORGANIZED HEALTH CARE EDUCATION/TRAINING PROGRAM

## 2023-07-18 PROCEDURE — 25000003 PHARM REV CODE 250: Performed by: PHYSICIAN ASSISTANT

## 2023-07-18 PROCEDURE — 97530 THERAPEUTIC ACTIVITIES: CPT

## 2023-07-18 PROCEDURE — 27000207 HC ISOLATION

## 2023-07-18 PROCEDURE — 25000003 PHARM REV CODE 250: Performed by: FAMILY MEDICINE

## 2023-07-18 PROCEDURE — 99231 PR SUBSEQUENT HOSPITAL CARE,LEVL I: ICD-10-PCS | Mod: ,,, | Performed by: PHYSICIAN ASSISTANT

## 2023-07-18 PROCEDURE — 21400001 HC TELEMETRY ROOM

## 2023-07-18 PROCEDURE — 99231 SBSQ HOSP IP/OBS SF/LOW 25: CPT | Mod: ,,, | Performed by: PHYSICIAN ASSISTANT

## 2023-07-18 RX ADMIN — SERTRALINE HYDROCHLORIDE 25 MG: 25 TABLET ORAL at 09:07

## 2023-07-18 RX ADMIN — HYDROCODONE BITARTRATE AND ACETAMINOPHEN 1 TABLET: 5; 325 TABLET ORAL at 03:07

## 2023-07-18 RX ADMIN — GABAPENTIN 200 MG: 100 CAPSULE ORAL at 03:07

## 2023-07-18 RX ADMIN — LIDOCAINE 1 PATCH: 50 PATCH CUTANEOUS at 01:07

## 2023-07-18 RX ADMIN — Medication 6 MG: at 09:07

## 2023-07-18 RX ADMIN — GABAPENTIN 200 MG: 100 CAPSULE ORAL at 09:07

## 2023-07-18 RX ADMIN — HYDROCODONE BITARTRATE AND ACETAMINOPHEN 1 TABLET: 5; 325 TABLET ORAL at 12:07

## 2023-07-18 RX ADMIN — LACTULOSE 20 G: 20 SOLUTION ORAL at 10:07

## 2023-07-18 RX ADMIN — MIDODRINE HYDROCHLORIDE 5 MG: 2.5 TABLET ORAL at 03:07

## 2023-07-18 RX ADMIN — MIDODRINE HYDROCHLORIDE 5 MG: 2.5 TABLET ORAL at 09:07

## 2023-07-18 RX ADMIN — LACTULOSE 20 G: 20 SOLUTION ORAL at 09:07

## 2023-07-18 RX ADMIN — COLLAGENASE SANTYL: 250 OINTMENT TOPICAL at 09:07

## 2023-07-18 RX ADMIN — ACETAMINOPHEN 500 MG: 500 TABLET ORAL at 09:07

## 2023-07-18 RX ADMIN — PANTOPRAZOLE SODIUM 40 MG: 40 TABLET, DELAYED RELEASE ORAL at 09:07
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
## 2023-07-18 NOTE — PT/OT/SLP PROGRESS
"Occupational Therapy   Treatment    Name: Rachel Zazueta  MRN: [id number]  Admitting Diagnosis:  Debility       Recommendations:     Discharge Recommendations: LTACH (long-term acute care hospital)  Discharge Equipment Recommendations:  none  Barriers to discharge:  Inaccessible home environment, Decreased caregiver support    Assessment:     Rachel Zazueta is a 42 y.o. female with a medical diagnosis of Debility.   Performance deficits affecting function are weakness, impaired endurance, impaired self care skills, impaired functional mobility, gait instability, impaired balance, decreased upper extremity function, decreased lower extremity function, decreased safety awareness, pain, decreased ROM, impaired skin, edema.     Rehab Prognosis:  Poor; patient would benefit from acute skilled OT services to address these deficits and reach maximum level of function.       Plan:     Patient to be seen 5 x/week to address the above listed problems via self-care/home management, therapeutic activities, therapeutic exercises  Plan of Care Expires: 07/31/23  Plan of Care Reviewed with: patient    Subjective     Chief Complaint: "My shoulder is hurting"  Patient/Family Comments/goals: "I am doing better"  Pain/Comfort:  Pain Rating 1: other (see comments) (Did not rate)  Location - Side 1: Left  Location - Orientation 1: upper  Location 1: shoulder  Pain Addressed 1: Cessation of Activity, Reposition  Pain Rating Post-Intervention 1: other (see comments) (Pt did not rate)  Location - Side 2: Right  Location - Orientation 2: upper  Location 2: shoulder  Pain Addressed 2: Cessation of Activity    Objective:     Communicated with: RN prior to session.  Patient found supine with PICC line, gu catheter, telemetry upon OT entry to room.    General Precautions: Standard, fall, contact    Orthopedic Precautions:N/A  Braces: N/A  Respiratory Status: Room air     Occupational Performance:     Bed Mobility:    Did not perform "     Functional Mobility/Transfers:  Did not perform    Activities of Daily Living:  Did not perform      AMPA 6 Click ADL: 11    Treatment & Education:  Pt was lying in bed upon entering room.  Pt was encouraged to sit in bed with HOB elevated more to increase sitting tolerance. Pt was able to tolerated sitting up with HOB at approximately 70 degrees. Pt was instructed in L UE there ex all planes 1 x 10 reps to increase strength and endurance. R shoulder was painful with active movement but patient did tolerate some passive range of motion mostly for shoulder abduction rather then shoulder flexion. Pt was able to perform AROM of R elbow, wrist and hand 1 x 10 reps. Bedside table was positioned to where patient was able to reach her breakfast and HOB was brought up more so she could eat off of table rather then chest as she is used to doing. Pt marlena sitting higher in bed but did c/o some discomfort. Pt marlena tx well this date. Pt was instructed and encouraged to move R UE as much as possible within pain tolerance to reduce stiffness and risk of contractures. Pt verbalized understanding.     Patient left HOB elevated with all lines intact, call button in reach, and RN notified    GOALS:   Multidisciplinary Problems       Occupational Therapy Goals          Problem: Occupational Therapy    Goal Priority Disciplines Outcome Interventions   Occupational Therapy Goal     OT, PT/OT Ongoing, Progressing    Description: Pt to perform UB dressing with MIN A seated EOB.   Pt to consistently demonstrate adherence to orthopedic precautions during all ADL's as instructed by OT.  Pt to demonstrate good dynamic and static seated balance as required to perform ADL's from seated level.  Pt to participate in upper extremity range of motion exercise program as educated by OT for maintenance or shoulder range of motion.  Pt to participate in EOB seated ADL for ~5 minutes for increased safety and activity tolerance upon discharge.                          Time Tracking:     OT Date of Treatment: 07/18/23  OT Start Time: 0810  OT Stop Time: 0840  OT Total Time (min): 30 min    Billable Minutes:Therapeutic Exercise 30    OT/ROSS: ROSS     Number of ROSS visits since last OT visit: 1 7/18/2023

## 2023-07-18 NOTE — PLAN OF CARE
Patient has rested quietly this shift. Patient has refused to turn majority of the shift. PRN medications given for constipation. Dressings changed this shift. PRN Medications for pain given.    Problem: Infection  Goal: Absence of Infection Signs and Symptoms  Outcome: Ongoing, Progressing     Problem: Adult Inpatient Plan of Care  Goal: Plan of Care Review  Outcome: Ongoing, Progressing  Goal: Optimal Comfort and Wellbeing  Outcome: Ongoing, Progressing     Problem: Bariatric Environmental Safety  Goal: Safety Maintained with Care  Outcome: Ongoing, Progressing     Problem: Impaired Wound Healing  Goal: Optimal Wound Healing  Outcome: Ongoing, Progressing     Problem: Skin Injury Risk Increased  Goal: Skin Health and Integrity  Outcome: Ongoing, Progressing     Problem: Fall Injury Risk  Goal: Absence of Fall and Fall-Related Injury  Outcome: Ongoing, Progressing     Problem: Pain Acute  Goal: Acceptable Pain Control and Functional Ability  Outcome: Ongoing, Progressing

## 2023-07-18 NOTE — PT/OT/SLP PROGRESS
Physical Therapy Treatment    Patient Name:  Rachel Zazueta   MRN:  [id number]    Recommendations:     Discharge Recommendations: LTACH (long-term acute care hospital), nursing facility, basic, nursing facility, skilled  Discharge Equipment Recommendations: none  Barriers to discharge: Inaccessible home and Decreased caregiver support    Assessment:     Rachel Zazueta is a 42 y.o. female admitted with a medical diagnosis of Debility.  She presents with the following impairments/functional limitations: weakness, impaired endurance, impaired functional mobility, impaired self care skills, gait instability, impaired balance, decreased upper extremity function, decreased lower extremity function, decreased safety awareness, decreased ROM, impaired skin, edema . Patient tolerated ROM ex on B LE with lesser complain pain on R lower leg today. Pt requires max/moderate assistance using bed rail in rolling to sides for bed repositioning.    Rehab Prognosis: Fair; patient would benefit from acute skilled PT services to address these deficits and reach maximum level of function.    Recent Surgery: * No surgery found *      Plan:     During this hospitalization, patient to be seen 5 x/week to address the identified rehab impairments via therapeutic activities, therapeutic exercises and progress toward the following goals:    Plan of Care Expires:  07/21/23    Subjective     Chief Complaint: Patient reports dec pain on right leg today.  Patient/Family Comments/goals: none stated  Pain/Comfort:  Pain Rating 2:  (did not rate)  Location - Side 2: Right  Location - Orientation 2: lower  Location 2: leg  Pain Addressed 2: Cessation of Activity, Reposition  Pain Rating Post-Intervention 2:  (did not rate)      Objective:     Communicated with patient prior to session.  Patient found supine with PICC line, gu catheter, telemetry upon PT entry to room.     General Precautions: Standard, fall, contact  Orthopedic Precautions:  N/A  Braces: N/A  Respiratory Status: Room air     Functional Mobility:  Bed Mobility:     Rolling Left:  moderate assistance and maximal assistance  Rolling Right: moderate assistance and maximal assistance      AM-PAC 6 CLICK MOBILITY  Turning over in bed (including adjusting bedclothes, sheets and blankets)?: 2  Sitting down on and standing up from a chair with arms (e.g., wheelchair, bedside commode, etc.): 1  Moving from lying on back to sitting on the side of the bed?: 1  Moving to and from a bed to a chair (including a wheelchair)?: 1  Need to walk in hospital room?: 1  Climbing 3-5 steps with a railing?: 1  Basic Mobility Total Score: 7       Treatment & Education:  Performed AAROM - AROM ex on B LE x 10 reps on each and rolling to sides using bed rail x 10 reps on each and max/moderate assistance and cues.    Patient left supine with all lines intact, call button in reach, and nursing notified..    GOALS:   Multidisciplinary Problems       Physical Therapy Goals          Problem: Physical Therapy    Goal Priority Disciplines Outcome Goal Variances Interventions   Physical Therapy Goal     PT, PT/OT Ongoing, Not Progressing     Description:   Patient will increase functional independence with mobility by performin. Pt able to perform and tolerate B LE ROM ex x 10 reps on each plane to inc mobility, inc body repositioning  and prevent in declining in functions.  2. Inc rolling to sides to minimal assistance and cues  3. Able to perform and tolerate  supine <>sit with Moderate Assistance and cues.  4. Able to perform and tolerate static sit at edge of the bed x ~10 minutes for inc participation and performance with self care activities.  5. Establish home ex program.                          Time Tracking:     PT Received On: 23  PT Start Time: 1018     PT Stop Time: 1033  PT Total Time (min): 15 min     Billable Minutes: Therapeutic Activity 15    Treatment Type: Treatment  PT/PTA: PT            07/18/2023

## 2023-07-18 NOTE — SUBJECTIVE & OBJECTIVE
Past Medical History:   Diagnosis Date    Anemia     Anxiety     Depressed     Diskitis     Hep C w/o coma, chronic     IV drug abuse     Kidney stone     Liver cirrhosis        Past Surgical History:   Procedure Laterality Date    ARTHROTOMY OF SHOULDER Left 11/15/2022    Procedure: ARTHROTOMY, SHOULDER;  Surgeon: Bjorn Hayes MD;  Location: [facility];  Service: Orthopedics;  Laterality: Left;  Left acromioclavicular joint arthrotomy    BACK SURGERY      benine tumor removal      forehead, age 9    CHOLECYSTECTOMY      KIDNEY SURGERY      LUMBAR FUSION Bilateral 3/2/2022    Procedure: FUSION, SPINE, LUMBAR;  Surgeon: Guille Templeton MD;  Location: [address];  Service: Neurosurgery;  Laterality: Bilateral;  AIRO  T10--Pelvis    LUMBAR FUSION N/A 1/24/2023    Procedure: **AIRO** T8-T12 POSTERIOR FUSION, T8-T10 LAMINECTOMY, HARDWARE REMOVAL AND WASHOUT;  Surgeon: Guille Templeton MD;  Location: [address];  Service: Neurosurgery;  Laterality: N/A;    REMOVAL OF HARDWARE FROM SPINE N/A 2/21/2022    Procedure: REMOVAL, HARDWARE, SPINE;  Surgeon: Guille Templeton MD;  Location: [address];  Service: Neurosurgery;  Laterality: N/A;  [place]    SPINE SURGERY      THORACIC LAMINECTOMY WITH FUSION N/A 2/2/2023    Procedure: LAMINECTOMY, SPINE, THORACIC, WITH FUSION (T4-Pelvis fusion w/ T9-10 corpectomies) **AIRO;  Surgeon: Guille Templeton MD;  Location: [address];  Service: Neurosurgery;  Laterality: N/A;  AIRO, 2 bovies, aquamantys, Globus, Depuy, antibiotic beads, TXA, Peroxide; [employer] plastics closure       Review of patient's allergies indicates:   Allergen Reactions    Bee venom protein (honey bee) Anaphylaxis     Patient reports she is allergic to bee stings.    Naproxen Anaphylaxis     Throat closing    12-23- Patient reports taking Ibuprofen 200 mg at home without problems. Verified X3. [place]    Wasp sting [allergen ext-venom-honey bee] Anaphylaxis    Adhesive Blisters    Shellfish containing products      Iodine and iodide containing products Hives and Itching     Allergic to iodine in seafood only    Nuts [tree nut] Rash       No current facility-administered medications on file prior to encounter.     Current Outpatient Medications on File Prior to Encounter   Medication Sig    bumetanide (BUMEX) 1 MG tablet Take 1 tablet (1 mg total) by mouth once daily.    buprenorphine-naloxone 8-2 mg (SUBOXONE) 8-2 mg Place under the tongue 2 (two) times a day.    DULoxetine (CYMBALTA) 60 MG capsule Take 60 mg by mouth once daily.    hydrOXYzine HCL (ATARAX) 25 MG tablet Take 1 tablet (25 mg total) by mouth 3 (three) times daily as needed for Anxiety.    hydrOXYzine pamoate (VISTARIL) 25 MG Cap Take 25 mg by mouth 2 (two) times daily as needed.    lactulose (CHRONULAC) 20 gram/30 mL Soln Take 30 mLs (20 g total) by mouth 3 (three) times daily.    melatonin (MELATIN) 3 mg tablet Take 2 tablets (6 mg total) by mouth nightly as needed for Insomnia.    pantoprazole (PROTONIX) 40 MG tablet Take 1 tablet (40 mg total) by mouth once daily.    spironolactone (ALDACTONE) 100 MG tablet Take 1 tablet (100 mg total) by mouth once daily.    tiZANidine (ZANAFLEX) 4 MG tablet Take 1 tablet (4 mg total) by mouth 3 (three) times daily as needed (Pain/muscle spasms).    acetaminophen (TYLENOL) 500 MG tablet Take 2 tablets (1,000 mg total) by mouth every 12 (twelve) hours. (Patient not taking: Reported on 6/22/2023)    calcium carbonate (TUMS) 200 mg calcium (500 mg) chewable tablet Take 2 tablets (1,000 mg total) by mouth 3 (three) times daily as needed.    folic acid (FOLVITE) 1 MG tablet Take 1 tablet (1 mg total) by mouth once daily. (Patient taking differently: Take 1 mg by mouth daily as needed (vitamin supplement).)    gabapentin (NEURONTIN) 300 MG capsule Take 3 capsules (900 mg total) by mouth 3 (three) times daily.    ondansetron (ZOFRAN) 4 MG tablet Take 8 mg by mouth 2 (two) times daily as needed for Nausea.    oxyCODONE  (ROXICODONE) 5 MG immediate release tablet Take 5 mg by mouth.    polyethylene glycol (GLYCOLAX) 17 gram PwPk Take 17 g by mouth daily as needed. (Patient not taking: Reported on 6/22/2023)    [DISCONTINUED] diclofenac sodium (VOLTAREN) 1 % Gel Apply 2 g topically 4 (four) times daily.     Family History       Problem Relation (Age of Onset)    Cirrhosis Father    Drug abuse Mother, Father    Hepatitis Mother, Father    Liver cancer Mother          Tobacco Use    Smoking status: Some Days     Packs/day: 0.50     Years: 23.00     Pack years: 11.50     Types: Cigarettes    Smokeless tobacco: Never    Tobacco comments:     patient states she knows she nees to quit.   Substance and Sexual Activity    Alcohol use: Not Currently     Comment: quit 2014    Drug use: Not Currently     Frequency: 4.0 times per week     Types: Marijuana     Comment: Patient denies needle use, only inhalation of methampehtamines or orally.  Last known drug use was prior to admission to hospital stay for surgery    Sexual activity: Yes     Partners: Male     Birth control/protection: None     Review of Systems   Constitutional:  Positive for fatigue. Negative for chills and fever.   HENT:  Negative for congestion and sore throat.    Respiratory:  Negative for cough and shortness of breath.    Cardiovascular:  Negative for chest pain and leg swelling.   Gastrointestinal:  Positive for constipation. Negative for abdominal pain, diarrhea, nausea and vomiting.   Genitourinary:  Negative for dysuria and hematuria.   Musculoskeletal:  Positive for arthralgias, gait problem and myalgias.        Right arm pain    Skin:  Positive for wound. Negative for rash.   Neurological:  Negative for dizziness and headaches.   Psychiatric/Behavioral:  Negative for agitation and confusion.    Objective:     Vital Signs (Most Recent):  Temp: 99.3 °F (37.4 °C) (07/18/23 1120)  Pulse: 106 (07/18/23 1200)  Resp: 18 (07/18/23 1120)  BP: (!) 108/56 (07/18/23 1120)  SpO2:  98 % (07/18/23 1120) Vital Signs (24h Range):  Temp:  [97.6 °F (36.4 °C)-99.3 °F (37.4 °C)] 99.3 °F (37.4 °C)  Pulse:  [] 106  Resp:  [12-18] 18  SpO2:  [95 %-100 %] 98 %  BP: (108-134)/(56-80) 108/56     Weight: 118.9 kg (262 lb 2 oz)  Body mass index is 41.05 kg/m².     Physical Exam  Constitutional:       Appearance: Normal appearance.      Comments: Continued generalized pain     HENT:      Head: Normocephalic and atraumatic.   Cardiovascular:      Rate and Rhythm: Normal rate and regular rhythm.   Pulmonary:      Effort: Pulmonary effort is normal. No respiratory distress.   Abdominal:      General: Bowel sounds are normal. There is no distension.      Palpations: Abdomen is soft.      Tenderness: There is no abdominal tenderness.   Musculoskeletal:         General: Tenderness present.      Right lower leg: No edema.      Left lower leg: No edema.      Comments: RUE tenderness and decreased ROM  Worked with therapy and not expressing pain      Skin:     General: Skin is warm and dry.      Comments: Multiple wounds on  bilateral heels and sacral region   Erythema of right face.  Will monitor    Neurological:      Mental Status: She is alert and oriented to person, place, and time. Mental status is at baseline.   Psychiatric:         Mood and Affect: Mood normal.         Behavior: Behavior normal.              Significant Labs: A1C: No results for input(s): HGBA1C in the last 4320 hours.  ABGs: No results for input(s): PH, PCO2, HCO3, POCSATURATED, BE, TOTALHB, COHB, METHB, O2HB, POCFIO2, PO2 in the last 48 hours.  Bilirubin:   Recent Labs   Lab 07/06/23  2249 07/07/23  1133 07/08/23  0604 07/10/23  0559 07/16/23  0613   BILITOT 3.1* 3.2* 3.0* 2.4* 2.3*       Blood Culture:   No results for input(s): LABBLOO in the last 48 hours.    BMP:   No results for input(s): GLU, NA, K, CL, CO2, BUN, CREATININE, CALCIUM, MG in the last 48 hours.    CBC:   No results for input(s): WBC, HGB, HCT, PLT in the last 48  hours.    CMP:   No results for input(s): NA, K, CL, CO2, GLU, BUN, CREATININE, CALCIUM, PROT, ALBUMIN, BILITOT, ALKPHOS, AST, ALT, ANIONGAP, EGFRNONAA in the last 48 hours.    Invalid input(s): ESTGFAFRICA    Cardiac Markers:   No results for input(s): CKMB, MYOGLOBIN, BNP, TROPISTAT in the last 48 hours.    Coagulation:   No results for input(s): PT, INR, APTT in the last 48 hours.    Lactic Acid:   No results for input(s): LACTATE in the last 48 hours.    Lipase: No results for input(s): LIPASE in the last 48 hours.  Lipid Panel: No results for input(s): CHOL, HDL, LDLCALC, TRIG, CHOLHDL in the last 48 hours.  Magnesium:   No results for input(s): MG in the last 48 hours.    POCT Glucose: No results for input(s): POCTGLUCOSE in the last 48 hours.  Prealbumin: No results for input(s): PREALBUMIN in the last 48 hours.  Respiratory Culture: No results for input(s): GSRESP, RESPIRATORYC in the last 48 hours.  Troponin:   No results for input(s): TROPONINI, TROPONINIHS in the last 48 hours.    TSH: No results for input(s): TSH in the last 4320 hours.  Urine Culture: No results for input(s): LABURIN in the last 48 hours.  Urine Studies:   No results for input(s): COLORU, APPEARANCEUA, PHUR, SPECGRAV, PROTEINUA, GLUCUA, KETONESU, BILIRUBINUA, OCCULTUA, NITRITE, UROBILINOGEN, LEUKOCYTESUR, RBCUA, WBCUA, BACTERIA, SQUAMEPITHEL, HYALINECASTS in the last 48 hours.    Invalid input(s): WRIGHTSUR      Significant Imaging: I have reviewed all pertinent imaging results/findings within the past 24 hours.

## 2023-07-18 NOTE — PLAN OF CARE
07/18/23 1354   Post-Acute Status   Post-Acute Authorization Placement   Post-Acute Placement Status Pending post-acute provider review/more information requested   Discharge Delays (!) Post-Acute Set-up       Patient remains awaiting placement. ALICIA contacted [facility] upon patient request to inquire about placement. ALICIA spoke with , Esther, who states that the patient was medically declined. ALICIA then spoke with Ila at [facility] in [place], [place]. Informed that [facility] had declined and patient is agreeable to placement at [facility]. The business office will be contacting patient to discuss financials.     Remain awaiting 142.

## 2023-07-18 NOTE — ASSESSMENT & PLAN NOTE
+ methamphetamine use on UDS  Cessation resources from CM at discharge   D/c suboxone for hypotension  Psych consulted for substance use disorder      Continue to defer opioid pain medications but pt requesting. BP does not tolerate and no IV access at this time.   Gabapentin and antidepressant preferable for tx.  Start lidocaine patch  Norco started over the weekend BID

## 2023-07-19 LAB
ALBUMIN SERPL BCP-MCNC: 1.1 G/DL (ref 3.5–5.2)
ALP SERPL-CCNC: 178 U/L (ref 55–135)
ALT SERPL W/O P-5'-P-CCNC: 14 U/L (ref 10–44)
ANION GAP SERPL CALC-SCNC: 3 MMOL/L (ref 8–16)
AST SERPL-CCNC: 33 U/L (ref 10–40)
BASOPHILS # BLD AUTO: 0.02 K/UL (ref 0–0.2)
BASOPHILS NFR BLD: 0.8 % (ref 0–1.9)
BILIRUB SERPL-MCNC: 2 MG/DL (ref 0.1–1)
BUN SERPL-MCNC: 11 MG/DL (ref 6–20)
CALCIUM SERPL-MCNC: 7.1 MG/DL (ref 8.7–10.5)
CHLORIDE SERPL-SCNC: 106 MMOL/L (ref 95–110)
CO2 SERPL-SCNC: 27 MMOL/L (ref 23–29)
CREAT SERPL-MCNC: 0.5 MG/DL (ref 0.5–1.4)
DIFFERENTIAL METHOD: ABNORMAL
EOSINOPHIL # BLD AUTO: 0.1 K/UL (ref 0–0.5)
EOSINOPHIL NFR BLD: 2.7 % (ref 0–8)
ERYTHROCYTE [DISTWIDTH] IN BLOOD BY AUTOMATED COUNT: 20.7 % (ref 11.5–14.5)
EST. GFR  (NO RACE VARIABLE): >60 ML/MIN/1.73 M^2
GLUCOSE SERPL-MCNC: 91 MG/DL (ref 70–110)
HCT VFR BLD AUTO: 26.1 % (ref 37–48.5)
HGB BLD-MCNC: 7.7 G/DL (ref 12–16)
IMM GRANULOCYTES # BLD AUTO: 0.01 K/UL (ref 0–0.04)
IMM GRANULOCYTES NFR BLD AUTO: 0.4 % (ref 0–0.5)
LYMPHOCYTES # BLD AUTO: 0.4 K/UL (ref 1–4.8)
LYMPHOCYTES NFR BLD: 15.8 % (ref 18–48)
MAGNESIUM SERPL-MCNC: 1.6 MG/DL (ref 1.6–2.6)
MCH RBC QN AUTO: 30.3 PG (ref 27–31)
MCHC RBC AUTO-ENTMCNC: 29.5 G/DL (ref 32–36)
MCV RBC AUTO: 103 FL (ref 82–98)
MONOCYTES # BLD AUTO: 0.2 K/UL (ref 0.3–1)
MONOCYTES NFR BLD: 6.9 % (ref 4–15)
NEUTROPHILS # BLD AUTO: 1.9 K/UL (ref 1.8–7.7)
NEUTROPHILS NFR BLD: 73.4 % (ref 38–73)
NRBC BLD-RTO: 0 /100 WBC
PLATELET # BLD AUTO: 76 K/UL (ref 150–450)
PMV BLD AUTO: 11.2 FL (ref 9.2–12.9)
POTASSIUM SERPL-SCNC: 3.8 MMOL/L (ref 3.5–5.1)
PROT SERPL-MCNC: 6.3 G/DL (ref 6–8.4)
RBC # BLD AUTO: 2.54 M/UL (ref 4–5.4)
SODIUM SERPL-SCNC: 136 MMOL/L (ref 136–145)
WBC # BLD AUTO: 2.6 K/UL (ref 3.9–12.7)

## 2023-07-19 PROCEDURE — 27000207 HC ISOLATION

## 2023-07-19 PROCEDURE — 25000003 PHARM REV CODE 250: Performed by: FAMILY MEDICINE

## 2023-07-19 PROCEDURE — 85025 COMPLETE CBC W/AUTO DIFF WBC: CPT | Performed by: PHYSICIAN ASSISTANT

## 2023-07-19 PROCEDURE — 25000003 PHARM REV CODE 250: Performed by: SURGERY

## 2023-07-19 PROCEDURE — 80053 COMPREHEN METABOLIC PANEL: CPT | Performed by: PHYSICIAN ASSISTANT

## 2023-07-19 PROCEDURE — 25000003 PHARM REV CODE 250: Performed by: STUDENT IN AN ORGANIZED HEALTH CARE EDUCATION/TRAINING PROGRAM

## 2023-07-19 PROCEDURE — 36415 COLL VENOUS BLD VENIPUNCTURE: CPT | Performed by: PHYSICIAN ASSISTANT

## 2023-07-19 PROCEDURE — 83735 ASSAY OF MAGNESIUM: CPT | Performed by: PHYSICIAN ASSISTANT

## 2023-07-19 PROCEDURE — 63600175 PHARM REV CODE 636 W HCPCS: Performed by: PHYSICIAN ASSISTANT

## 2023-07-19 PROCEDURE — 94761 N-INVAS EAR/PLS OXIMETRY MLT: CPT

## 2023-07-19 PROCEDURE — 97530 THERAPEUTIC ACTIVITIES: CPT | Mod: CO

## 2023-07-19 PROCEDURE — 99232 SBSQ HOSP IP/OBS MODERATE 35: CPT | Mod: 95,,, | Performed by: PHYSICIAN ASSISTANT

## 2023-07-19 PROCEDURE — 99232 PR SUBSEQUENT HOSPITAL CARE,LEVL II: ICD-10-PCS | Mod: 95,,, | Performed by: PHYSICIAN ASSISTANT

## 2023-07-19 PROCEDURE — 25000003 PHARM REV CODE 250: Performed by: PHYSICIAN ASSISTANT

## 2023-07-19 PROCEDURE — 21400001 HC TELEMETRY ROOM

## 2023-07-19 RX ORDER — DOCUSATE SODIUM 100 MG/1
100 CAPSULE, LIQUID FILLED ORAL 2 TIMES DAILY
Status: DISCONTINUED | OUTPATIENT
Start: 2023-07-19 | End: 2023-07-21 | Stop reason: HOSPADM

## 2023-07-19 RX ORDER — POLYETHYLENE GLYCOL 3350 17 G/17G
17 POWDER, FOR SOLUTION ORAL DAILY
Status: DISCONTINUED | OUTPATIENT
Start: 2023-07-19 | End: 2023-07-21 | Stop reason: HOSPADM

## 2023-07-19 RX ORDER — MAGNESIUM SULFATE HEPTAHYDRATE 40 MG/ML
2 INJECTION, SOLUTION INTRAVENOUS ONCE
Status: COMPLETED | OUTPATIENT
Start: 2023-07-19 | End: 2023-07-19

## 2023-07-19 RX ADMIN — GABAPENTIN 200 MG: 100 CAPSULE ORAL at 08:07

## 2023-07-19 RX ADMIN — HYDROCODONE BITARTRATE AND ACETAMINOPHEN 1 TABLET: 5; 325 TABLET ORAL at 08:07

## 2023-07-19 RX ADMIN — MAGNESIUM SULFATE HEPTAHYDRATE 2 G: 40 INJECTION, SOLUTION INTRAVENOUS at 02:07

## 2023-07-19 RX ADMIN — DOCUSATE SODIUM 100 MG: 100 CAPSULE, LIQUID FILLED ORAL at 08:07

## 2023-07-19 RX ADMIN — LACTULOSE 20 G: 20 SOLUTION ORAL at 08:07

## 2023-07-19 RX ADMIN — MIDODRINE HYDROCHLORIDE 5 MG: 2.5 TABLET ORAL at 08:07

## 2023-07-19 RX ADMIN — ACETAMINOPHEN 500 MG: 500 TABLET ORAL at 08:07

## 2023-07-19 RX ADMIN — MIDODRINE HYDROCHLORIDE 5 MG: 2.5 TABLET ORAL at 02:07

## 2023-07-19 RX ADMIN — Medication 6 MG: at 08:07

## 2023-07-19 RX ADMIN — COLLAGENASE SANTYL: 250 OINTMENT TOPICAL at 08:07

## 2023-07-19 RX ADMIN — LIDOCAINE 1 PATCH: 50 PATCH CUTANEOUS at 02:07

## 2023-07-19 RX ADMIN — SERTRALINE HYDROCHLORIDE 25 MG: 25 TABLET ORAL at 08:07

## 2023-07-19 RX ADMIN — GABAPENTIN 200 MG: 100 CAPSULE ORAL at 02:07

## 2023-07-19 RX ADMIN — PANTOPRAZOLE SODIUM 40 MG: 40 TABLET, DELAYED RELEASE ORAL at 08:07

## 2023-07-19 RX ADMIN — POLYETHYLENE GLYCOL 3350 17 G: 17 POWDER, FOR SOLUTION ORAL at 04:07

## 2023-07-19 NOTE — PROGRESS NOTES
[facility] - Med Surg (3rd Fl)  Adult Nutrition  Consult Note    SUMMARY     Recommendations  1. Rec'd continue low sodium diet.   2. Rec'd Srinivasa BID. Please mix into 8-10 oz of water, sprite or juice to encourage consumption and promote wound healing.   3. Rec'd continue meds for constipation and encourage consumption of fruits and vegetables.   4. RD to follow and make rec's accordingly.    Interventions:  1. Collaboration with other medical providers  2. Commercial beverage    Goals: Pt will meet at least 75% ENN by RD follow up.  Nutrition Goal Status: goal met  Communication of RD Recs: other (comment) (POC)    Assessment and Plan    Nutrition Problem  Increased protein and energy needs    Related to (etiology):   Altered skin integrity and other conditions requiring increased nutritional needs    Signs and Symptoms (as evidenced by):   Bilateral wounds to heel, sacrum; bed bound status, liver cirrhosis, UDS + for amphetamines and h/o substance abuse    Interventions/Recommendations (treatment strategy):  1. Rec'd continue low sodium diet.   2. Rec'd Srinivasa BID. Please mix into 8-10 oz of water, sprite or juice to encourage consumption and promote wound healing.   3. Rec'd continue meds for constipation and encourage consumption of fruits and vegetables.   4. RD to follow and make rec's accordingly.    Interventions:  1. Collaboration with other medical providers  2. Commercial beverage    Goals: Pt will meet at least 75% ENN by RD follow up.  Nutrition Goal Status: goal met      Nutrition Diagnosis Status:   Improving           Malnutrition Assessment                                       Reason for Assessment    Reason For Assessment: consult (hypoalbuminemia)  Diagnosis: other (see comments) (no active principal problem)  Interdisciplinary Rounds: did not attend  General Information Comments:   7/11/2023  Pt continues on low sodium diet with Srinivasa BID. Meeting ENN with % meal consumption. Drinking Srinivasa  "ONS BID and verbalized understanding of what it is for. Denies recent weight loss and N/V/C/D. Encouraged pt to continue with Srinivasa regimen and meal intake to promote wound healing. Noted iron panel completed indicating anemia of chronic disease. NFPE not appropriate at this time due to RD assessment findings. Will continue to monitor.      7/14/2023  No changes since last RD follow up. Pt still consuming adequate intake and drinking Srinivasa BID. Will continue to monitor.    7/19/2023  Pt still waiting placement. Only consumed 50-75% of 2 meals yesterday; however pt has a multitude of snacks in her room that she has been eating. She reports pain and constipation. LBM:7/12/2023 -- meds being given. Encouraged patient to consume vegetables on meal trays. She is still compliant with Srinivasa BID. Will continue to monitor for any nutrition related changes.     Nutrition Discharge Planning: Pt to dc on low sodium diet and Srinivasa BID x 4 weeks from 7/7/2023     Nutrition Risk Screen    Nutrition Risk Screen: large or nonhealing wound, burn or pressure injury    Nutrition/Diet History    Food Preferences: macaroni and cheese, Coke, Sprite  Spiritual, Cultural Beliefs, Christianity Practices, Values that Affect Care: no  Food Allergies: shellfish, tree nut, other (see comments) (iodine)  Factors Affecting Nutritional Intake: constipation, pain     Anthropometrics    Temp: 97.5 °F (36.4 °C)  Height Method: Stated  Height: 5' 7" (170.2 cm)  Height (inches): 67 in  Weight Method: Bed Scale  Weight: 118.9 kg (262 lb 2 oz)  Weight (lb): 262.13 lb  Ideal Body Weight (IBW), Female: 135 lb  % Ideal Body Weight, Female (lb): 194.17 %  BMI (Calculated): 41  BMI Grade: greater than 40 - morbid obesity       Lab/Procedures/Meds    Pertinent Labs Reviewed: reviewed    Pertinent Labs Comments: UDS + for amphetamines  Lab Results   Component Value Date    WBC 2.60 (L) 07/19/2023    RBC 2.54 (L) 07/19/2023    HGB 7.7 (L) 07/19/2023    HCT 26.1 (L) " 07/19/2023     (H) 07/19/2023    MCH 30.3 07/19/2023    MCHC 29.5 (L) 07/19/2023    RDW 20.7 (H) 07/19/2023     Lab Results   Component Value Date    CALCIUM 7.1 (L) 07/19/2023       Pertinent Medications Reviewed: reviewed  Pertinent Medications Comments: midodrine, PPI, lactulose    Physical Findings/Assessment  Prince Score 14       Estimated/Assessed Needs    Weight Used For Calorie Calculations: 61.4 kg (135 lb 5.8 oz) (IBW)  Energy Calorie Requirements (kcal): 6716-3961  Energy Need Method: Kcal/kg (25-30 kcal/kg for wounds with obesity, cirrhosis, and substance abuse)  Protein Requirements: 77-92 g (1.25-1.5 g/kg/d IBW for wounds, substance abuse and cirrhosis)  Weight Used For Protein Calculations: 61.4 kg (135 lb 5.8 oz) (IBW)  Fluid Requirements (mL): 7017-4213  Estimated Fluid Requirement Method: RDA Method (or per MD rec's)  RDA Method (mL): 1535         Nutrition Prescription Ordered    Current Diet Order: low sodium  Oral Nutrition Supplement: Srinivasa BID    Evaluation of Received Nutrient/Fluid Intake    I/O: none recorded  Energy Calories Required: meeting needs  Protein Required: meeting needs  Comments: LBM: 7/12/2023  Tolerance: tolerating    % Meal Intake: 50-75% + food from outside sources    Nutrition Risk    Level of Risk/Frequency of Follow-up: moderate      Monitor and Evaluation    Food and Nutrient Intake: energy intake, food and beverage intake, enteral nutrition intake  Food and Nutrient Adminstration: diet order  Knowledge/Beliefs/Attitudes: food and nutrition knowledge/skill, beliefs and attitudes  Physical Activity and Function: nutrition-related ADLs and IADLs, factors affecting access to physical activity  Anthropometric Measurements: height/length, weight, weight change, body mass index  Biochemical Data, Medical Tests and Procedures: electrolyte and renal panel, gastrointestinal profile, glucose/endocrine profile, inflammatory profile, lipid profile  Nutrition-Focused  Physical Findings: overall appearance       Nutrition Follow-Up    RD Follow-up?: Yes

## 2023-07-19 NOTE — PLAN OF CARE
Patient awake, alert, oriented. Room air. Tele normal sinus. Contact precautions maintained. Turning patient every two hours- heel boots in place and pillows in use. PT and OT working with patient. Wound care completed. CHG bath completed. Jones catheter draining to gravity (concentrated urine noted). R IJ triple lumen dressing intact, clean, and dry (due to be changed 7/22). All lumens flushing without difficulty. PRN pain management continued. Patient has not had a bowel movement in a week. Patient complaining of bloating and abdominal discomfort. Soap suds enema ordered and given, but no relief obtained. PA made aware. Miralax ordered and will repeat soap suds enema tomorrow. Call bell in reach. Patient free from fall or injury. Plan of care reviewed with patient.

## 2023-07-19 NOTE — ASSESSMENT & PLAN NOTE
Component of hypoalbuminemia and suspicion for underlying infection  U/A abnormal but unclear if this is source  Blood cultures pending. If negative at 48-72 hours and vitals remain stable I would feel comfortable with discharge   MAP stable in 70s  7/9---Will give her a 500ml bolus as her systolic dipped to 88 this am    1 unit of pRBC, decrease gabapentin     7/11 BP still hypotensive. 500cc bolus ordered.  D/c suboxone and tizanidine.  Decreased gabapentin.  Echo pending.  Cortisol and ACTH wnl.      7/12 Some improvement.  Holding all pain medications except gabapentin.     7/13 MRI c spine limited due to motion but no obvious signs of infection or abscess.  Started midodrine yesterday.  Will increase.  Patient has poor clinical prognosis.    7/14 Improving with midodrine      7/15 stable; monitor     7/19 Stable
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
## 2023-07-19 NOTE — PT/OT/SLP PROGRESS
"Occupational Therapy   Treatment    Name: Rachel Zazueta  MRN: [id number]  Admitting Diagnosis:  Debility       Recommendations:     Discharge Recommendations: LTACH (long-term acute care hospital), nursing facility, basic, nursing facility, skilled  Discharge Equipment Recommendations:  none  Barriers to discharge:  Inaccessible home environment, Decreased caregiver support    Assessment:     Rachel Zazueta is a 42 y.o. female with a medical diagnosis of Debility.  Performance deficits affecting function are weakness, impaired endurance, impaired self care skills, impaired functional mobility, gait instability, impaired balance, decreased upper extremity function, decreased lower extremity function, decreased safety awareness, decreased ROM, impaired skin, edema.     Rehab Prognosis:  Poor; patient would benefit from acute skilled OT services to address these deficits and reach maximum level of function.       Plan:     Patient to be seen 5 x/week to address the above listed problems via self-care/home management, therapeutic activities, therapeutic exercises  Plan of Care Expires: 07/31/23  Plan of Care Reviewed with: patient    Subjective     Chief Complaint: "My arm hurts"  Patient/Family Comments/goals: "I can't go"  Pain/Comfort:  Pain Rating 1: other (see comments) (Pt did not rate)  Location - Side 1: Right  Location - Orientation 1: upper  Location 1: arm  Pain Addressed 1: Distraction, Reposition  Pain Rating Post-Intervention 1: other (see comments) (Pt did not rate)    Objective:     Communicated with: RN prior to session.  Patient found supine with PICC line, gu catheter, telemetry upon OT entry to room.    General Precautions: Standard, fall    Orthopedic Precautions:N/A  Braces: N/A  Respiratory Status: Room air     Occupational Performance:     Bed Mobility:    Patient completed Rolling/Turning to Left with  maximal assistance     Functional Mobility/Transfers:  Did not perform    Activities " of Daily Living:  Toileting: maximal assistance to use bedpan      [facility] 6 Click ADL: 12    Treatment & Education:  Pt was lying in bed upon entering room. Pt rolled to L side with Max A x 2 with verbal and tactile cues for hand/arm and Leg placement. Once patient was rolled to her L side patients hip had to be pulled posteriorly to maintain sidelying position with the use of the draw sheet. Pt was able to remain on her L side for approximately 25 minutes eventually not needing assistance to stay on L side. Pt required Max A to use bed swain after receiving an enema from the RN's. Pt required Max A x 3 to pull her up in the bed although patient did attempt to help. Pt was positioned in a sitting position in the bed to best facilitate gravity's help to have a bowel movement. Pt marlena tx well and had limited crying with distraction.      Patient left with bed in chair position with all lines intact, call button in reach, RN notified, and RN present    GOALS:   Multidisciplinary Problems       Occupational Therapy Goals          Problem: Occupational Therapy    Goal Priority Disciplines Outcome Interventions   Occupational Therapy Goal     OT, PT/OT Ongoing, Progressing    Description: Pt to perform UB dressing with MIN A seated EOB.   Pt to consistently demonstrate adherence to orthopedic precautions during all ADL's as instructed by OT.  Pt to demonstrate good dynamic and static seated balance as required to perform ADL's from seated level.  Pt to participate in upper extremity range of motion exercise program as educated by OT for maintenance or shoulder range of motion.  Pt to participate in EOB seated ADL for ~5 minutes for increased safety and activity tolerance upon discharge.                         Time Tracking:     OT Date of Treatment: 07/19/23  OT Start Time: 0217  OT Stop Time: 0254  OT Total Time (min): 37 min    Billable Minutes:Therapeutic Activity 37    OT/ROSS: ROSS     Number of ROSS visits since last OT  visit: 2    7/19/2023

## 2023-07-19 NOTE — PLAN OF CARE
Problem: Pain Acute  Goal: Acceptable Pain Control and Functional Ability  Outcome: Ongoing, Progressing     Problem: Fall Injury Risk  Goal: Absence of Fall and Fall-Related Injury  Outcome: Ongoing, Progressing     Problem: Skin Injury Risk Increased  Goal: Skin Health and Integrity  Outcome: Ongoing, Progressing

## 2023-07-19 NOTE — PT/OT/SLP PROGRESS
Physical Therapy      Patient Name:  Rachel Zazueta   MRN:  [id number]    Patient not seen today secondary to Toileting. Will follow-up later when appropriate.

## 2023-07-19 NOTE — ASSESSMENT & PLAN NOTE
+ methamphetamine use on UDS  Cessation resources from CM at discharge   D/c suboxone for hypotension  Psych consulted for substance use disorder      Continue to defer opioid pain medications but pt requesting. BP does not tolerate and no IV access at this time.     Gabapentin and antidepressant preferable for tx.  Start lidocaine patch  Norco started over the weekend BID   Central line in place

## 2023-07-19 NOTE — SUBJECTIVE & OBJECTIVE
Past Medical History:   Diagnosis Date    Anemia     Anxiety     Depressed     Diskitis     Hep C w/o coma, chronic     IV drug abuse     Kidney stone     Liver cirrhosis        Past Surgical History:   Procedure Laterality Date    ARTHROTOMY OF SHOULDER Left 11/15/2022    Procedure: ARTHROTOMY, SHOULDER;  Surgeon: Bjorn Hayes MD;  Location: [facility];  Service: Orthopedics;  Laterality: Left;  Left acromioclavicular joint arthrotomy    BACK SURGERY      benine tumor removal      forehead, age 9    CHOLECYSTECTOMY      KIDNEY SURGERY      LUMBAR FUSION Bilateral 3/2/2022    Procedure: FUSION, SPINE, LUMBAR;  Surgeon: Guille Templeton MD;  Location: [address];  Service: Neurosurgery;  Laterality: Bilateral;  AIRO  T10--Pelvis    LUMBAR FUSION N/A 1/24/2023    Procedure: **AIRO** T8-T12 POSTERIOR FUSION, T8-T10 LAMINECTOMY, HARDWARE REMOVAL AND WASHOUT;  Surgeon: Guille Templeton MD;  Location: [address];  Service: Neurosurgery;  Laterality: N/A;    REMOVAL OF HARDWARE FROM SPINE N/A 2/21/2022    Procedure: REMOVAL, HARDWARE, SPINE;  Surgeon: Guille Templeton MD;  Location: [address];  Service: Neurosurgery;  Laterality: N/A;  [place]    SPINE SURGERY      THORACIC LAMINECTOMY WITH FUSION N/A 2/2/2023    Procedure: LAMINECTOMY, SPINE, THORACIC, WITH FUSION (T4-Pelvis fusion w/ T9-10 corpectomies) **AIRO;  Surgeon: Guille Templeton MD;  Location: [address];  Service: Neurosurgery;  Laterality: N/A;  AIRO, 2 bovies, aquamantys, Globus, Depuy, antibiotic beads, TXA, Peroxide; [employer] plastics closure       Review of patient's allergies indicates:   Allergen Reactions    Bee venom protein (honey bee) Anaphylaxis     Patient reports she is allergic to bee stings.    Naproxen Anaphylaxis     Throat closing    12-23- Patient reports taking Ibuprofen 200 mg at home without problems. Verified X3. [place]    Wasp sting [allergen ext-venom-honey bee] Anaphylaxis    Adhesive Blisters    Shellfish containing products      Iodine and iodide containing products Hives and Itching     Allergic to iodine in seafood only    Nuts [tree nut] Rash       No current facility-administered medications on file prior to encounter.     Current Outpatient Medications on File Prior to Encounter   Medication Sig    bumetanide (BUMEX) 1 MG tablet Take 1 tablet (1 mg total) by mouth once daily.    buprenorphine-naloxone 8-2 mg (SUBOXONE) 8-2 mg Place under the tongue 2 (two) times a day.    DULoxetine (CYMBALTA) 60 MG capsule Take 60 mg by mouth once daily.    hydrOXYzine HCL (ATARAX) 25 MG tablet Take 1 tablet (25 mg total) by mouth 3 (three) times daily as needed for Anxiety.    hydrOXYzine pamoate (VISTARIL) 25 MG Cap Take 25 mg by mouth 2 (two) times daily as needed.    lactulose (CHRONULAC) 20 gram/30 mL Soln Take 30 mLs (20 g total) by mouth 3 (three) times daily.    melatonin (MELATIN) 3 mg tablet Take 2 tablets (6 mg total) by mouth nightly as needed for Insomnia.    pantoprazole (PROTONIX) 40 MG tablet Take 1 tablet (40 mg total) by mouth once daily.    spironolactone (ALDACTONE) 100 MG tablet Take 1 tablet (100 mg total) by mouth once daily.    tiZANidine (ZANAFLEX) 4 MG tablet Take 1 tablet (4 mg total) by mouth 3 (three) times daily as needed (Pain/muscle spasms).    acetaminophen (TYLENOL) 500 MG tablet Take 2 tablets (1,000 mg total) by mouth every 12 (twelve) hours. (Patient not taking: Reported on 6/22/2023)    calcium carbonate (TUMS) 200 mg calcium (500 mg) chewable tablet Take 2 tablets (1,000 mg total) by mouth 3 (three) times daily as needed.    folic acid (FOLVITE) 1 MG tablet Take 1 tablet (1 mg total) by mouth once daily. (Patient taking differently: Take 1 mg by mouth daily as needed (vitamin supplement).)    gabapentin (NEURONTIN) 300 MG capsule Take 3 capsules (900 mg total) by mouth 3 (three) times daily.    ondansetron (ZOFRAN) 4 MG tablet Take 8 mg by mouth 2 (two) times daily as needed for Nausea.    oxyCODONE  (ROXICODONE) 5 MG immediate release tablet Take 5 mg by mouth.    polyethylene glycol (GLYCOLAX) 17 gram PwPk Take 17 g by mouth daily as needed. (Patient not taking: Reported on 6/22/2023)    [DISCONTINUED] diclofenac sodium (VOLTAREN) 1 % Gel Apply 2 g topically 4 (four) times daily.     Family History       Problem Relation (Age of Onset)    Cirrhosis Father    Drug abuse Mother, Father    Hepatitis Mother, Father    Liver cancer Mother          Tobacco Use    Smoking status: Some Days     Packs/day: 0.50     Years: 23.00     Pack years: 11.50     Types: Cigarettes    Smokeless tobacco: Never    Tobacco comments:     patient states she knows she nees to quit.   Substance and Sexual Activity    Alcohol use: Not Currently     Comment: quit 2014    Drug use: Not Currently     Frequency: 4.0 times per week     Types: Marijuana     Comment: Patient denies needle use, only inhalation of methampehtamines or orally.  Last known drug use was prior to admission to hospital stay for surgery    Sexual activity: Yes     Partners: Male     Birth control/protection: None     Review of Systems   Constitutional:  Positive for fatigue. Negative for chills and fever.   HENT:  Negative for congestion and sore throat.    Respiratory:  Negative for cough and shortness of breath.    Cardiovascular:  Negative for chest pain and leg swelling.   Gastrointestinal:  Positive for constipation. Negative for abdominal pain, diarrhea, nausea and vomiting.   Genitourinary:  Negative for dysuria and hematuria.   Musculoskeletal:  Positive for arthralgias, gait problem and myalgias.        Right arm pain    Skin:  Positive for wound. Negative for rash.   Neurological:  Negative for dizziness and headaches.   Psychiatric/Behavioral:  Negative for agitation and confusion.    Objective:     Vital Signs (Most Recent):  Temp: 97.5 °F (36.4 °C) (07/19/23 1128)  Pulse: 101 (07/19/23 1401)  Resp: 14 (07/19/23 1128)  BP: 104/61 (07/19/23 1128)  SpO2: 100 %  (07/19/23 1128) Vital Signs (24h Range):  Temp:  [97.5 °F (36.4 °C)-98.6 °F (37 °C)] 97.5 °F (36.4 °C)  Pulse:  [] 101  Resp:  [14-20] 14  SpO2:  [97 %-100 %] 100 %  BP: ()/(54-81) 104/61     Weight: 118.9 kg (262 lb 2 oz)  Body mass index is 41.05 kg/m².     Physical Exam  Constitutional:       Appearance: Normal appearance.      Comments: Continued generalized pain     HENT:      Head: Normocephalic and atraumatic.   Cardiovascular:      Rate and Rhythm: Normal rate and regular rhythm.   Pulmonary:      Effort: Pulmonary effort is normal. No respiratory distress.   Abdominal:      General: Bowel sounds are normal. There is no distension.      Palpations: Abdomen is soft.      Tenderness: There is no abdominal tenderness.   Musculoskeletal:         General: Tenderness present.      Right lower leg: No edema.      Left lower leg: No edema.      Comments: RUE tenderness and decreased ROM  Worked with therapy and not expressing pain      Skin:     General: Skin is warm and dry.      Comments: Multiple wounds on  bilateral heels and sacral region   Erythema of right face.  Will monitor    Neurological:      Mental Status: She is alert and oriented to person, place, and time. Mental status is at baseline.   Psychiatric:         Mood and Affect: Mood normal.         Behavior: Behavior normal.              Significant Labs: A1C: No results for input(s): HGBA1C in the last 4320 hours.  ABGs: No results for input(s): PH, PCO2, HCO3, POCSATURATED, BE, TOTALHB, COHB, METHB, O2HB, POCFIO2, PO2 in the last 48 hours.  Bilirubin:   Recent Labs   Lab 07/07/23  1133 07/08/23  0604 07/10/23  0559 07/16/23  0613 07/19/23  0506   BILITOT 3.2* 3.0* 2.4* 2.3* 2.0*       Blood Culture:   No results for input(s): LABBLOO in the last 48 hours.    BMP:   Recent Labs   Lab 07/19/23  0506   GLU 91      K 3.8      CO2 27   BUN 11   CREATININE 0.5   CALCIUM 7.1*   MG 1.6       CBC:   Recent Labs   Lab 07/19/23  0506    WBC 2.60*   HGB 7.7*   HCT 26.1*   PLT 76*       CMP:   Recent Labs   Lab 07/19/23  0506      K 3.8      CO2 27   GLU 91   BUN 11   CREATININE 0.5   CALCIUM 7.1*   PROT 6.3   ALBUMIN 1.1*   BILITOT 2.0*   ALKPHOS 178*   AST 33   ALT 14   ANIONGAP 3*       Cardiac Markers:   No results for input(s): CKMB, MYOGLOBIN, BNP, TROPISTAT in the last 48 hours.    Coagulation:   No results for input(s): PT, INR, APTT in the last 48 hours.    Lactic Acid:   No results for input(s): LACTATE in the last 48 hours.    Lipase: No results for input(s): LIPASE in the last 48 hours.  Lipid Panel: No results for input(s): CHOL, HDL, LDLCALC, TRIG, CHOLHDL in the last 48 hours.  Magnesium:   Recent Labs   Lab 07/19/23  0506   MG 1.6       POCT Glucose: No results for input(s): POCTGLUCOSE in the last 48 hours.  Prealbumin: No results for input(s): PREALBUMIN in the last 48 hours.  Respiratory Culture: No results for input(s): GSRESP, RESPIRATORYC in the last 48 hours.  Troponin:   No results for input(s): TROPONINI, TROPONINIHS in the last 48 hours.    TSH: No results for input(s): TSH in the last 4320 hours.  Urine Culture: No results for input(s): LABURIN in the last 48 hours.  Urine Studies:   No results for input(s): COLORU, APPEARANCEUA, PHUR, SPECGRAV, PROTEINUA, GLUCUA, KETONESU, BILIRUBINUA, OCCULTUA, NITRITE, UROBILINOGEN, LEUKOCYTESUR, RBCUA, WBCUA, BACTERIA, SQUAMEPITHEL, HYALINECASTS in the last 48 hours.    Invalid input(s): TAMANNAR      Significant Imaging: I have reviewed all pertinent imaging results/findings within the past 24 hours.

## 2023-07-19 NOTE — ASSESSMENT & PLAN NOTE
Lab Results   Component Value Date    ALBUMIN 1.1 (L) 07/19/2023       Dietician consult-- Continue Srinivasa BID per recs

## 2023-07-19 NOTE — PT/OT/SLP PROGRESS
Physical Therapy      Patient Name:  Rachel Zazueta   MRN:  [id number]    Patient not seen today secondary to Toileting Patient is still on the bed pan working on it and requesting to do PT tommorow. Nursing/PCT made aware.Will follow-up tomorrow.

## 2023-07-20 PROCEDURE — 99231 PR SUBSEQUENT HOSPITAL CARE,LEVL I: ICD-10-PCS | Mod: ,,, | Performed by: PHYSICIAN ASSISTANT

## 2023-07-20 PROCEDURE — 97110 THERAPEUTIC EXERCISES: CPT

## 2023-07-20 PROCEDURE — 27000207 HC ISOLATION

## 2023-07-20 PROCEDURE — 25000003 PHARM REV CODE 250: Performed by: SURGERY

## 2023-07-20 PROCEDURE — 99231 SBSQ HOSP IP/OBS SF/LOW 25: CPT | Mod: ,,, | Performed by: PHYSICIAN ASSISTANT

## 2023-07-20 PROCEDURE — 25000003 PHARM REV CODE 250: Performed by: PHYSICIAN ASSISTANT

## 2023-07-20 PROCEDURE — 25000003 PHARM REV CODE 250: Performed by: FAMILY MEDICINE

## 2023-07-20 PROCEDURE — 21400001 HC TELEMETRY ROOM

## 2023-07-20 PROCEDURE — 25000003 PHARM REV CODE 250: Performed by: STUDENT IN AN ORGANIZED HEALTH CARE EDUCATION/TRAINING PROGRAM

## 2023-07-20 PROCEDURE — 94761 N-INVAS EAR/PLS OXIMETRY MLT: CPT

## 2023-07-20 PROCEDURE — 99900035 HC TECH TIME PER 15 MIN (STAT)

## 2023-07-20 RX ORDER — GLYCERIN 1 G/1
1 SUPPOSITORY RECTAL ONCE
Status: COMPLETED | OUTPATIENT
Start: 2023-07-20 | End: 2023-07-20

## 2023-07-20 RX ADMIN — GABAPENTIN 200 MG: 100 CAPSULE ORAL at 02:07

## 2023-07-20 RX ADMIN — GLYCERIN 1 SUPPOSITORY: 2 SUPPOSITORY RECTAL at 08:07

## 2023-07-20 RX ADMIN — POLYETHYLENE GLYCOL 3350 17 G: 17 POWDER, FOR SOLUTION ORAL at 08:07

## 2023-07-20 RX ADMIN — ACETAMINOPHEN 500 MG: 500 TABLET ORAL at 09:07

## 2023-07-20 RX ADMIN — GABAPENTIN 200 MG: 100 CAPSULE ORAL at 09:07

## 2023-07-20 RX ADMIN — Medication 6 MG: at 09:07

## 2023-07-20 RX ADMIN — SERTRALINE HYDROCHLORIDE 25 MG: 25 TABLET ORAL at 08:07

## 2023-07-20 RX ADMIN — MIDODRINE HYDROCHLORIDE 5 MG: 2.5 TABLET ORAL at 09:07

## 2023-07-20 RX ADMIN — COLLAGENASE SANTYL: 250 OINTMENT TOPICAL at 08:07

## 2023-07-20 RX ADMIN — GABAPENTIN 200 MG: 100 CAPSULE ORAL at 08:07

## 2023-07-20 RX ADMIN — DOCUSATE SODIUM 100 MG: 100 CAPSULE, LIQUID FILLED ORAL at 09:07

## 2023-07-20 RX ADMIN — DOCUSATE SODIUM 100 MG: 100 CAPSULE, LIQUID FILLED ORAL at 08:07

## 2023-07-20 RX ADMIN — PANTOPRAZOLE SODIUM 40 MG: 40 TABLET, DELAYED RELEASE ORAL at 08:07

## 2023-07-20 RX ADMIN — HYDROCODONE BITARTRATE AND ACETAMINOPHEN 1 TABLET: 5; 325 TABLET ORAL at 08:07

## 2023-07-20 RX ADMIN — MIDODRINE HYDROCHLORIDE 5 MG: 2.5 TABLET ORAL at 08:07

## 2023-07-20 RX ADMIN — LACTULOSE 20 G: 20 SOLUTION ORAL at 08:07

## 2023-07-20 RX ADMIN — LIDOCAINE 1 PATCH: 50 PATCH CUTANEOUS at 02:07

## 2023-07-20 RX ADMIN — ACETAMINOPHEN 500 MG: 500 TABLET ORAL at 02:07

## 2023-07-20 RX ADMIN — ACETAMINOPHEN 500 MG: 500 TABLET ORAL at 05:07

## 2023-07-20 RX ADMIN — MIDODRINE HYDROCHLORIDE 5 MG: 2.5 TABLET ORAL at 02:07

## 2023-07-20 NOTE — ASSESSMENT & PLAN NOTE
H/o constipation, now bed bound and on norco   Resume lactulose, colace and miralax  Enema 7/19  Suppository 7/20    + bowel sounds and small bowel movement  Repeat enema today

## 2023-07-20 NOTE — PT/OT/SLP PROGRESS
"Occupational Therapy      Patient Name:  Rachel Zazueta   MRN:  [id number]    Patient not seen today secondary to Patient unwilling to participate. Upon entry Pt reporting that she is having stomach pains and cramps due to constipation in addition to increased shoulder and leg pain today. She reports that she has been performing "enough rolling" with nursing and PT and is in pain. She denied sitting EOB and upper extremity Active assisted range of motion/passive range of motion due to shoulder pain. Will follow-up 7/21/2023.    7/20/2023  "
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
## 2023-07-20 NOTE — PLAN OF CARE
07/20/23 1456   Post-Acute Status   Post-Acute Authorization Placement   Post-Acute Placement Status Pending payor review/awaiting authorization (if required)   Coverage [employer]   Discharge Delays (!) Post-Acute Set-up   Discharge Plan   Discharge Plan A New Nursing Home placement - [facility] care facility   Discharge Plan B New Nursing Home placement - [facility] care facility         Patient remains waiting nursing home placement.   [facility] has accepted patient.   142 received this afternoon.  CM contacted Ila at [facility] who confirms tomorrow as transfer, as long as patient is medically ready.  142, PASRR, MAR update, and updated notes attached in [facility] for their review.    Secure chat sent to ROSA ELENA Mayer to notify her of the above, and to ask for PPD order as this is needed for nursing home placement.    Tomorrow nursing home will need:  Discharge orders (discharge summary)  2. Immunization record (including PPD)    [facility] as [facility] is bed confined.     Ila with [facility] aware of travel needs, and ask that we notify them when [facility] is here for transport. She also states that when orders are received and reviewed the patient nurse could call report to [place] [phone] nurse.

## 2023-07-20 NOTE — PLAN OF CARE
07/20/23 1200   Post-Acute Status   Post-Acute Placement Status Pending state direction/certification   Coverage [employer]   Discharge Delays (!) Post-Acute Set-up   Discharge Plan   Discharge Plan A [facility] Nursing [facility] placement - snf care facility         Patient medically accepted to [facility] in [place] per Aubrie in admissions. Admit pending 142.  Once 142 is obtained, patient can discharge to facility.

## 2023-07-20 NOTE — PT/OT/SLP PROGRESS
Physical Therapy Treatment    Patient Name:  Rachel Zazueta   MRN:  [id number]    Recommendations:     Discharge Recommendations: LTACH (long-term acute care hospital), nursing facility, basic, nursing facility, skilled  Discharge Equipment Recommendations: none  Barriers to discharge: Inaccessible home and Decreased caregiver support    Assessment:     Rachel Zazueta is a 42 y.o. female admitted with a medical diagnosis of Debility.  She presents with the following impairments/functional limitations: weakness, impaired endurance, impaired self care skills, impaired functional mobility, gait instability, impaired balance, decreased upper extremity function, decreased lower extremity function, pain, decreased safety awareness, impaired skin, edema, decreased ROM. At first patient declined PT tx today  due to stomach cramps and still constipated. Able to convinced patient with ROM ex on B LE after multiple encouragement. Patient tolerated AAROM  - AROM ex on B LE with limited crying due to discomfort. Patient declined  to do rolling exercise and wants to get some sleep.     Rehab Prognosis: Fair; patient would benefit from acute skilled PT services to address these deficits and reach maximum level of function.    Recent Surgery: * No surgery found *      Plan:     During this hospitalization, patient to be seen 5 x/week to address the identified rehab impairments via therapeutic activities, therapeutic exercises and progress toward the following goals:    Plan of Care Expires:  07/21/23    Subjective     Chief Complaint: stomach cramps and constipation  Patient/Family Comments/goals: to feel better  Pain/Comfort:  Pain Rating 1:  (did not rate)  Location 1: abdomen (stomach cramps)  Pain Addressed 1: Cessation of Activity  Pain Rating Post-Intervention 1:  (Pt did not rate)      Objective:     Communicated with patient  prior to session.  Patient found supine with PICC line, telemetry, gu catheter upon PT  entry to room.     General Precautions: Standard, fall  Orthopedic Precautions: N/A  Braces: N/A  Respiratory Status: Room air     Functional Mobility:  AAROM - AROM ex on B LE x 10 reps on each possible plane at supine position.      AM-PAC 6 CLICK MOBILITY  Turning over in bed (including adjusting bedclothes, sheets and blankets)?: 2  Sitting down on and standing up from a chair with arms (e.g., wheelchair, bedside commode, etc.): 1  Moving from lying on back to sitting on the side of the bed?: 1  Moving to and from a bed to a chair (including a wheelchair)?: 1  Need to walk in hospital room?: 1  Climbing 3-5 steps with a railing?: 1  Basic Mobility Total Score: 7       Treatment & Education:  Provided AAROM - AROM ex on B LE x 10 reps on each possible plane at supine position.  Patient left supine with all lines intact, call button in reach, and nursing notified..    GOALS:   Multidisciplinary Problems       Physical Therapy Goals          Problem: Physical Therapy    Goal Priority Disciplines Outcome Goal Variances Interventions   Physical Therapy Goal     PT, PT/OT Ongoing, Not Progressing     Description:   Patient will increase functional independence with mobility by performin. Pt able to perform and tolerate B LE ROM ex x 10 reps on each plane to inc mobility, inc body repositioning  and prevent in declining in functions.  2. Inc rolling to sides to minimal assistance and cues  3. Able to perform and tolerate  supine <>sit with Moderate Assistance and cues.  4. Able to perform and tolerate static sit at edge of the bed x ~10 minutes for inc participation and performance with self care activities.  5. Establish home ex program.                          Time Tracking:     PT Received On: 23  PT Start Time: 940     PT Stop Time: 955  PT Total Time (min): 15 min     Billable Minutes: Therapeutic Exercise 15    Treatment Type: Treatment  PT/PTA: PT           2023

## 2023-07-20 NOTE — ASSESSMENT & PLAN NOTE
Bilateral heels with escharSacral region    General surgery consulted---santyl ordered.     Continue wound care

## 2023-07-20 NOTE — PLAN OF CARE
"Patient awake, alert, oriented. Room air. Tele normal sinus. Contact precautions maintained. Turning patient every two hours- heel boots in place and pillows in use. PT and OT working with patient. Wound care partially completed- patient refused WC to heels and refused CHG bath. Patient verbalizes "can y'all please wait and let me rest". Asked patient about finishing WC and CHG bath x2 and patient "just wants to rest". Jones catheter draining to gravity (concentrated urine noted). R IJ triple lumen dressing intact, clean, and dry (due to be changed 7/22). All lumens flushing without difficulty. PRN pain management continued. Patient received a suppository, soap suds enema, miralax, colace, and lactulose today per order. Patient had large bowel movement. Patient verbalizes feeling relief after having bowel movement. Call bell in reach. Patient free from fall or injury. Plan of care reviewed with patient.         "

## 2023-07-21 VITALS
BODY MASS INDEX: 41.14 KG/M2 | RESPIRATION RATE: 18 BRPM | OXYGEN SATURATION: 100 % | SYSTOLIC BLOOD PRESSURE: 114 MMHG | TEMPERATURE: 100 F | DIASTOLIC BLOOD PRESSURE: 73 MMHG | HEIGHT: 67 IN | WEIGHT: 262.13 LBS | HEART RATE: 107 BPM

## 2023-07-21 LAB
ANION GAP SERPL CALC-SCNC: 3 MMOL/L (ref 8–16)
BUN SERPL-MCNC: 9 MG/DL (ref 6–20)
CALCIUM SERPL-MCNC: 7.2 MG/DL (ref 8.7–10.5)
CHLORIDE SERPL-SCNC: 106 MMOL/L (ref 95–110)
CO2 SERPL-SCNC: 26 MMOL/L (ref 23–29)
CREAT SERPL-MCNC: 0.5 MG/DL (ref 0.5–1.4)
EST. GFR  (NO RACE VARIABLE): >60 ML/MIN/1.73 M^2
GLUCOSE SERPL-MCNC: 96 MG/DL (ref 70–110)
MAGNESIUM SERPL-MCNC: 1.6 MG/DL (ref 1.6–2.6)
POTASSIUM SERPL-SCNC: 3.7 MMOL/L (ref 3.5–5.1)
SODIUM SERPL-SCNC: 135 MMOL/L (ref 136–145)

## 2023-07-21 PROCEDURE — 86580 TB INTRADERMAL TEST: CPT | Performed by: PHYSICIAN ASSISTANT

## 2023-07-21 PROCEDURE — 63600175 PHARM REV CODE 636 W HCPCS: Performed by: PHYSICIAN ASSISTANT

## 2023-07-21 PROCEDURE — 80048 BASIC METABOLIC PNL TOTAL CA: CPT | Performed by: PHYSICIAN ASSISTANT

## 2023-07-21 PROCEDURE — 25000003 PHARM REV CODE 250: Performed by: SURGERY

## 2023-07-21 PROCEDURE — 25000003 PHARM REV CODE 250: Performed by: STUDENT IN AN ORGANIZED HEALTH CARE EDUCATION/TRAINING PROGRAM

## 2023-07-21 PROCEDURE — 30200315 PPD INTRADERMAL TEST REV CODE 302: Performed by: PHYSICIAN ASSISTANT

## 2023-07-21 PROCEDURE — 97530 THERAPEUTIC ACTIVITIES: CPT

## 2023-07-21 PROCEDURE — 25000003 PHARM REV CODE 250: Performed by: FAMILY MEDICINE

## 2023-07-21 PROCEDURE — 63600175 PHARM REV CODE 636 W HCPCS: Performed by: SURGERY

## 2023-07-21 PROCEDURE — 97535 SELF CARE MNGMENT TRAINING: CPT | Mod: CO

## 2023-07-21 PROCEDURE — 25000003 PHARM REV CODE 250: Performed by: PHYSICIAN ASSISTANT

## 2023-07-21 PROCEDURE — 36415 COLL VENOUS BLD VENIPUNCTURE: CPT | Performed by: PHYSICIAN ASSISTANT

## 2023-07-21 PROCEDURE — 99238 PR HOSPITAL DISCHARGE DAY,<30 MIN: ICD-10-PCS | Mod: ,,, | Performed by: PHYSICIAN ASSISTANT

## 2023-07-21 PROCEDURE — 83735 ASSAY OF MAGNESIUM: CPT | Performed by: PHYSICIAN ASSISTANT

## 2023-07-21 PROCEDURE — 99238 HOSP IP/OBS DSCHRG MGMT 30/<: CPT | Mod: ,,, | Performed by: PHYSICIAN ASSISTANT

## 2023-07-21 PROCEDURE — 94760 N-INVAS EAR/PLS OXIMETRY 1: CPT

## 2023-07-21 RX ORDER — MIDODRINE HYDROCHLORIDE 5 MG/1
5 TABLET ORAL 3 TIMES DAILY
Qty: 90 TABLET | Refills: 0
Start: 2023-07-21 | End: 2024-07-20

## 2023-07-21 RX ORDER — SERTRALINE HYDROCHLORIDE 25 MG/1
25 TABLET, FILM COATED ORAL DAILY
Qty: 30 TABLET | Refills: 11
Start: 2023-07-22 | End: 2024-07-21

## 2023-07-21 RX ORDER — GABAPENTIN 100 MG/1
200 CAPSULE ORAL 3 TIMES DAILY
Qty: 180 CAPSULE | Refills: 0
Start: 2023-07-21 | End: 2024-07-20

## 2023-07-21 RX ORDER — HYDROCODONE BITARTRATE AND ACETAMINOPHEN 5; 325 MG/1; MG/1
1 TABLET ORAL EVERY 12 HOURS PRN
Qty: 14 TABLET | Refills: 0 | Status: SHIPPED | OUTPATIENT
Start: 2023-07-21 | End: 2023-07-28

## 2023-07-21 RX ORDER — MAGNESIUM SULFATE HEPTAHYDRATE 40 MG/ML
2 INJECTION, SOLUTION INTRAVENOUS ONCE
Status: COMPLETED | OUTPATIENT
Start: 2023-07-21 | End: 2023-07-21

## 2023-07-21 RX ORDER — DOCUSATE SODIUM 100 MG/1
100 CAPSULE, LIQUID FILLED ORAL 2 TIMES DAILY
Refills: 0
Start: 2023-07-21

## 2023-07-21 RX ORDER — CALCIUM CARBONATE 300MG(750)
400 TABLET,CHEWABLE ORAL DAILY
Start: 2023-07-21

## 2023-07-21 RX ADMIN — TUBERCULIN PURIFIED PROTEIN DERIVATIVE 5 UNITS: 5 INJECTION, SOLUTION INTRADERMAL at 09:07

## 2023-07-21 RX ADMIN — LIDOCAINE 1 PATCH: 50 PATCH CUTANEOUS at 01:07

## 2023-07-21 RX ADMIN — HYDROCODONE BITARTRATE AND ACETAMINOPHEN 1 TABLET: 5; 325 TABLET ORAL at 03:07

## 2023-07-21 RX ADMIN — ACETAMINOPHEN 500 MG: 500 TABLET ORAL at 09:07

## 2023-07-21 RX ADMIN — HYDROCODONE BITARTRATE AND ACETAMINOPHEN 1 TABLET: 5; 325 TABLET ORAL at 02:07

## 2023-07-21 RX ADMIN — POLYETHYLENE GLYCOL 3350 17 G: 17 POWDER, FOR SOLUTION ORAL at 09:07

## 2023-07-21 RX ADMIN — COLLAGENASE SANTYL: 250 OINTMENT TOPICAL at 09:07

## 2023-07-21 RX ADMIN — SERTRALINE HYDROCHLORIDE 25 MG: 25 TABLET ORAL at 09:07

## 2023-07-21 RX ADMIN — MIDODRINE HYDROCHLORIDE 5 MG: 2.5 TABLET ORAL at 09:07

## 2023-07-21 RX ADMIN — MAGNESIUM SULFATE HEPTAHYDRATE 2 G: 40 INJECTION, SOLUTION INTRAVENOUS at 11:07

## 2023-07-21 RX ADMIN — DOCUSATE SODIUM 100 MG: 100 CAPSULE, LIQUID FILLED ORAL at 09:07

## 2023-07-21 RX ADMIN — ONDANSETRON HYDROCHLORIDE 4 MG: 2 SOLUTION INTRAMUSCULAR; INTRAVENOUS at 03:07

## 2023-07-21 RX ADMIN — GABAPENTIN 200 MG: 100 CAPSULE ORAL at 09:07

## 2023-07-21 RX ADMIN — PANTOPRAZOLE SODIUM 40 MG: 40 TABLET, DELAYED RELEASE ORAL at 09:07

## 2023-07-21 NOTE — SUBJECTIVE & OBJECTIVE
Past Medical History:   Diagnosis Date    Anemia     Anxiety     Depressed     Diskitis     Hep C w/o coma, chronic     IV drug abuse     Kidney stone     Liver cirrhosis        Past Surgical History:   Procedure Laterality Date    ARTHROTOMY OF SHOULDER Left 11/15/2022    Procedure: ARTHROTOMY, SHOULDER;  Surgeon: Bjorn Hayes MD;  Location: [facility];  Service: Orthopedics;  Laterality: Left;  Left acromioclavicular joint arthrotomy    BACK SURGERY      benine tumor removal      forehead, age 9    CHOLECYSTECTOMY      KIDNEY SURGERY      LUMBAR FUSION Bilateral 3/2/2022    Procedure: FUSION, SPINE, LUMBAR;  Surgeon: Guille Templeton MD;  Location: [address];  Service: Neurosurgery;  Laterality: Bilateral;  AIRO  T10--Pelvis    LUMBAR FUSION N/A 1/24/2023    Procedure: **AIRO** T8-T12 POSTERIOR FUSION, T8-T10 LAMINECTOMY, HARDWARE REMOVAL AND WASHOUT;  Surgeon: Guille Templeton MD;  Location: [address];  Service: Neurosurgery;  Laterality: N/A;    REMOVAL OF HARDWARE FROM SPINE N/A 2/21/2022    Procedure: REMOVAL, HARDWARE, SPINE;  Surgeon: Guille Templeton MD;  Location: [address];  Service: Neurosurgery;  Laterality: N/A;  [place]    SPINE SURGERY      THORACIC LAMINECTOMY WITH FUSION N/A 2/2/2023    Procedure: LAMINECTOMY, SPINE, THORACIC, WITH FUSION (T4-Pelvis fusion w/ T9-10 corpectomies) **AIRO;  Surgeon: Guille Templeton MD;  Location: [address];  Service: Neurosurgery;  Laterality: N/A;  AIRO, 2 bovies, aquamantys, Globus, Depuy, antibiotic beads, TXA, Peroxide; [employer] plastics closure       Review of patient's allergies indicates:   Allergen Reactions    Bee venom protein (honey bee) Anaphylaxis     Patient reports she is allergic to bee stings.    Naproxen Anaphylaxis     Throat closing    12-23- Patient reports taking Ibuprofen 200 mg at home without problems. Verified X3. [place]    Wasp sting [allergen ext-venom-honey bee] Anaphylaxis    Adhesive Blisters    Shellfish containing products      Iodine and iodide containing products Hives and Itching     Allergic to iodine in seafood only    Nuts [tree nut] Rash       No current facility-administered medications on file prior to encounter.     Current Outpatient Medications on File Prior to Encounter   Medication Sig    melatonin (MELATIN) 3 mg tablet Take 2 tablets (6 mg total) by mouth nightly as needed for Insomnia.    pantoprazole (PROTONIX) 40 MG tablet Take 1 tablet (40 mg total) by mouth once daily.    [DISCONTINUED] bumetanide (BUMEX) 1 MG tablet Take 1 tablet (1 mg total) by mouth once daily.    [DISCONTINUED] buprenorphine-naloxone 8-2 mg (SUBOXONE) 8-2 mg Place under the tongue 2 (two) times a day.    [DISCONTINUED] DULoxetine (CYMBALTA) 60 MG capsule Take 60 mg by mouth once daily.    [DISCONTINUED] hydrOXYzine HCL (ATARAX) 25 MG tablet Take 1 tablet (25 mg total) by mouth 3 (three) times daily as needed for Anxiety.    [DISCONTINUED] hydrOXYzine pamoate (VISTARIL) 25 MG Cap Take 25 mg by mouth 2 (two) times daily as needed.    [DISCONTINUED] lactulose (CHRONULAC) 20 gram/30 mL Soln Take 30 mLs (20 g total) by mouth 3 (three) times daily.    [DISCONTINUED] spironolactone (ALDACTONE) 100 MG tablet Take 1 tablet (100 mg total) by mouth once daily.    [DISCONTINUED] tiZANidine (ZANAFLEX) 4 MG tablet Take 1 tablet (4 mg total) by mouth 3 (three) times daily as needed (Pain/muscle spasms).    acetaminophen (TYLENOL) 500 MG tablet Take 2 tablets (1,000 mg total) by mouth every 12 (twelve) hours.    polyethylene glycol (GLYCOLAX) 17 gram PwPk Take 17 g by mouth daily as needed.    [DISCONTINUED] calcium carbonate (TUMS) 200 mg calcium (500 mg) chewable tablet Take 2 tablets (1,000 mg total) by mouth 3 (three) times daily as needed.    [DISCONTINUED] diclofenac sodium (VOLTAREN) 1 % Gel Apply 2 g topically 4 (four) times daily.    [DISCONTINUED] folic acid (FOLVITE) 1 MG tablet Take 1 tablet (1 mg total) by mouth once daily. (Patient taking  differently: Take 1 mg by mouth daily as needed (vitamin supplement).)    [DISCONTINUED] gabapentin (NEURONTIN) 300 MG capsule Take 3 capsules (900 mg total) by mouth 3 (three) times daily.    [DISCONTINUED] ondansetron (ZOFRAN) 4 MG tablet Take 8 mg by mouth 2 (two) times daily as needed for Nausea.    [DISCONTINUED] oxyCODONE (ROXICODONE) 5 MG immediate release tablet Take 5 mg by mouth.     Family History       Problem Relation (Age of Onset)    Cirrhosis Father    Drug abuse Mother, Father    Hepatitis Mother, Father    Liver cancer Mother          Tobacco Use    Smoking status: Some Days     Packs/day: 0.50     Years: 23.00     Pack years: 11.50     Types: Cigarettes    Smokeless tobacco: Never    Tobacco comments:     patient states she knows she nees to quit.   Substance and Sexual Activity    Alcohol use: Not Currently     Comment: quit 2014    Drug use: Not Currently     Frequency: 4.0 times per week     Types: Marijuana     Comment: Patient denies needle use, only inhalation of methampehtamines or orally.  Last known drug use was prior to admission to hospital stay for surgery    Sexual activity: Yes     Partners: Male     Birth control/protection: None     Review of Systems   Constitutional:  Negative for chills, fatigue and fever.   HENT:  Negative for congestion and sore throat.    Respiratory:  Negative for cough and shortness of breath.    Cardiovascular:  Negative for chest pain and leg swelling.   Gastrointestinal:  Negative for abdominal pain, constipation, diarrhea, nausea and vomiting.   Genitourinary:  Negative for dysuria and hematuria.   Musculoskeletal:  Positive for arthralgias, gait problem and myalgias.        Right arm pain    Skin:  Positive for wound. Negative for rash.   Neurological:  Negative for dizziness and headaches.   Psychiatric/Behavioral:  Negative for agitation and confusion.    Objective:     Vital Signs (Most Recent):  Temp: 99.2 °F (37.3 °C) (07/21/23 0727)  Pulse: 106  (07/21/23 1000)  Resp: 16 (07/21/23 0727)  BP: 122/68 (07/21/23 0727)  SpO2: 100 % (07/21/23 0730) Vital Signs (24h Range):  Temp:  [98.1 °F (36.7 °C)-99.6 °F (37.6 °C)] 99.2 °F (37.3 °C)  Pulse:  [] 106  Resp:  [16-20] 16  SpO2:  [96 %-100 %] 100 %  BP: ()/(52-68) 122/68     Weight: 118.9 kg (262 lb 2 oz)  Body mass index is 41.05 kg/m².     Physical Exam  Constitutional:       Appearance: Normal appearance.      Comments: Continued generalized pain     HENT:      Head: Normocephalic and atraumatic.   Cardiovascular:      Rate and Rhythm: Normal rate and regular rhythm.   Pulmonary:      Effort: Pulmonary effort is normal. No respiratory distress.   Abdominal:      General: Bowel sounds are normal. There is no distension.      Palpations: Abdomen is soft.      Tenderness: There is no abdominal tenderness.   Musculoskeletal:         General: Tenderness present.      Right lower leg: No edema.      Left lower leg: No edema.      Comments: RUE tenderness and decreased ROM  Worked with therapy and not expressing pain      Skin:     General: Skin is warm and dry.      Comments: Multiple wounds on  bilateral heels and sacral region   Erythema of right face.  Will monitor    Neurological:      Mental Status: She is alert and oriented to person, place, and time. Mental status is at baseline.   Psychiatric:         Mood and Affect: Mood normal.         Behavior: Behavior normal.              Significant Labs: A1C: No results for input(s): HGBA1C in the last 4320 hours.  ABGs: No results for input(s): PH, PCO2, HCO3, POCSATURATED, BE, TOTALHB, COHB, METHB, O2HB, POCFIO2, PO2 in the last 48 hours.  Bilirubin:   Recent Labs   Lab 07/07/23  1133 07/08/23  0604 07/10/23  0559 07/16/23  0613 07/19/23  0506   BILITOT 3.2* 3.0* 2.4* 2.3* 2.0*       Blood Culture:   No results for input(s): LABBLOO in the last 48 hours.    BMP:   Recent Labs   Lab 07/21/23  0427   GLU 96   *   K 3.7      CO2 26   BUN 9    CREATININE 0.5   CALCIUM 7.2*   MG 1.6       CBC:   No results for input(s): WBC, HGB, HCT, PLT in the last 48 hours.    CMP:   Recent Labs   Lab 07/21/23  0427   *   K 3.7      CO2 26   GLU 96   BUN 9   CREATININE 0.5   CALCIUM 7.2*   ANIONGAP 3*       Cardiac Markers:   No results for input(s): CKMB, MYOGLOBIN, BNP, TROPISTAT in the last 48 hours.    Coagulation:   No results for input(s): PT, INR, APTT in the last 48 hours.    Lactic Acid:   No results for input(s): LACTATE in the last 48 hours.    Lipase: No results for input(s): LIPASE in the last 48 hours.  Lipid Panel: No results for input(s): CHOL, HDL, LDLCALC, TRIG, CHOLHDL in the last 48 hours.  Magnesium:   Recent Labs   Lab 07/21/23  0427   MG 1.6       POCT Glucose: No results for input(s): POCTGLUCOSE in the last 48 hours.  Prealbumin: No results for input(s): PREALBUMIN in the last 48 hours.  Respiratory Culture: No results for input(s): GSRESP, RESPIRATORYC in the last 48 hours.  Troponin:   No results for input(s): TROPONINI, TROPONINIHS in the last 48 hours.    TSH: No results for input(s): TSH in the last 4320 hours.  Urine Culture: No results for input(s): LABURIN in the last 48 hours.  Urine Studies:   No results for input(s): COLORU, APPEARANCEUA, PHUR, SPECGRAV, PROTEINUA, GLUCUA, KETONESU, BILIRUBINUA, OCCULTUA, NITRITE, UROBILINOGEN, LEUKOCYTESUR, RBCUA, WBCUA, BACTERIA, SQUAMEPITHEL, HYALINECASTS in the last 48 hours.    Invalid input(s): WRIGHTSUR      Significant Imaging: I have reviewed all pertinent imaging results/findings within the past 24 hours.

## 2023-07-21 NOTE — ASSESSMENT & PLAN NOTE
Chronic debility  Patient unable to care for herself  CM working placement to [facility] but denied.    Discharge today to NH

## 2023-07-21 NOTE — PT/OT/SLP PROGRESS
"Occupational Therapy   Treatment    Name: Rachel Zazueta  MRN: [id number]  Admitting Diagnosis:  Debility       Recommendations:     Discharge Recommendations: nursing facility, basic, nursing facility, skilled  Discharge Equipment Recommendations:  none  Barriers to discharge:  Inaccessible home environment, Decreased caregiver support    Assessment:     Rachel Zazueta is a 42 y.o. female with a medical diagnosis of Debility.  Performance deficits affecting function are weakness, impaired endurance, impaired self care skills, impaired functional mobility, gait instability, impaired balance, decreased upper extremity function, decreased lower extremity function, decreased safety awareness, pain, decreased ROM, impaired skin, edema.     Rehab Prognosis:  Poor; patient would benefit from acute skilled OT services to address these deficits and reach maximum level of function.       Plan:     Patient to be seen 5 x/week to address the above listed problems via self-care/home management, therapeutic activities, therapeutic exercises  Plan of Care Expires: 07/31/23  Plan of Care Reviewed with: patient    Subjective     Chief Complaint: "I feel much better today"  Patient/Family Comments/goals: "I finally had a bowel movement"  Pain/Comfort:  Pain Rating 1: other (see comments) (Pt did not rate)  Location - Side 1: Right  Location - Orientation 1: upper  Location 1: shoulder  Pain Addressed 1: Cessation of Activity, Distraction  Pain Rating Post-Intervention 1: other (see comments) (Pt did not rate)    Objective:     Communicated with: RN prior to session.  Patient found HOB elevated with gu catheter, PICC line, Other (comments) (B heel protectors) upon OT entry to room.    General Precautions: Standard, fall    Orthopedic Precautions:N/A  Braces: N/A  Respiratory Status: Room air     Occupational Performance:     Bed Mobility:    Pt did not perform    Functional Mobility/Transfers:  Pt did not " perform    Activities of Daily Living:  Pt did not perform      AMPA 6 Click ADL: 12    Treatment & Education:  Pt was lying in bed upon entering room with lunch on bedside tray. Pt was encouraged to sit more in an upright position to eat by elevating HOB. Pt agreed but wasn't able to come much past 45 degrees due to c/o pain and stiffness in her back. Pt required assistance with opening her soda and her dressing packages. Pt was educated on HEP due to pending discharge today. Pt verbalized understanding. Pt was encouraged to tell the facility she is being discharged to about her R shoulder to avoid any further injury and to possibly set up an appt with an Orthopaedic physician where she is going to be. Pt verbalized understanding.     Patient left HOB elevated with all lines intact, call button in reach, and RN notified    GOALS:   Multidisciplinary Problems       Occupational Therapy Goals          Problem: Occupational Therapy    Goal Priority Disciplines Outcome Interventions   Occupational Therapy Goal     OT, PT/OT Ongoing, Progressing    Description: Pt to perform UB dressing with MIN A seated EOB.   Pt to consistently demonstrate adherence to orthopedic precautions during all ADL's as instructed by OT.  Pt to demonstrate good dynamic and static seated balance as required to perform ADL's from seated level.  Pt to participate in upper extremity range of motion exercise program as educated by OT for maintenance or shoulder range of motion.  Pt to participate in EOB seated ADL for ~5 minutes for increased safety and activity tolerance upon discharge.                         Time Tracking:     OT Date of Treatment: 07/21/23  OT Start Time: 1203  OT Stop Time: 1218  OT Total Time (min): 15 min    Billable Minutes:Self Care/Home Management 15    OT/ROSS: ROSS     Number of ROSS visits since last OT visit: 3    7/21/2023

## 2023-07-21 NOTE — PLAN OF CARE
07/21/23 1130   Post-Acute Status   Post-Acute Authorization Placement   Post-Acute Placement Status Pending post-acute provider review/more information requested   Coverage [employer]   Discharge Delays Orders Needed   Discharge Plan   Discharge Plan A New Nursing Home placement - long-term care facility   Discharge Plan B New Nursing Home placement - long-term care facility         Spoke with Ila at [facility] who confirms she is still able to take patient today. ROSA ELENA Mayer aware discharge summary is needed (these are the orders for nursing home). Once discharge summary is written, it will be sent to nursing home for review.

## 2023-07-21 NOTE — ASSESSMENT & PLAN NOTE
Monitor AST and ALT  Ok to give her tylenol for pain, as long as she is getting less than 2g/day total     Picc line consult placed due to poor IV access; failed  Central line placed.     Remove central line at discharge  She is educated that she will need to f/u with GI close to NH

## 2023-07-21 NOTE — ASSESSMENT & PLAN NOTE
+ nitrites on U/A; WB on microscopic mildly elevated and only trace amount of leukocytes  H/o ESBL klebsiella Pneumoniae  D/c IV meropenem.  Low suspicion for UTI.  U/A did not reflex.

## 2023-07-21 NOTE — PLAN OF CARE
07/21/23 1200   Post-Acute Status   Post-Acute Authorization Placement   Post-Acute Placement Status Set-up Complete/Auth obtained   Coverage [employer]   Discharge Delays None known at this time   Discharge Plan   Discharge Plan A New Nursing Home placement - group home care facility   Discharge Plan B New Nursing Home placement - group home care facility         Discharge summary and immunization record showing PPD uploaded in careport for [facility]'s review.   FAMILIA contacted Ila at [facility] to le her know if the above. She states she will call if anything else is needed. She states nurse can call report to [place] [phone] nurse.    Patient will travel by ambulance service.     nurse Latisha, aware.
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
## 2023-07-21 NOTE — ASSESSMENT & PLAN NOTE
+ methamphetamine use on UDS  Cessation resources from CM at discharge   D/c suboxone for hypotension  Psych consulted for substance use disorder      Continue to defer opioid pain medications but pt requesting. BP does not tolerate and no IV access at this time.     Gabapentin and antidepressant preferable for tx.  Start lidocaine patch  Norco started over the weekend BID   Central line in place     Pain is better controlled. Since restarted on norco while inpatient will d/c with 7 day supply.  If she has pain outpatient recommend chronic pain management referral.

## 2023-07-21 NOTE — PLAN OF CARE
Problem: Infection  Goal: Absence of Infection Signs and Symptoms  Outcome: Ongoing, Progressing     Problem: Adult Inpatient Plan of Care  Goal: Plan of Care Review  Outcome: Ongoing, Progressing     Problem: Adult Inpatient Plan of Care  Goal: Patient-Specific Goal (Individualized)  Outcome: Ongoing, Progressing     Problem: Adult Inpatient Plan of Care  Goal: Optimal Comfort and Wellbeing  Outcome: Ongoing, Progressing

## 2023-07-21 NOTE — ASSESSMENT & PLAN NOTE
H/o constipation, now bed bound and on norco   Resume lactulose, colace and miralax  Enema 7/19  Suppository 7/20    + bowel sounds and small bowel movement  Repeat enema    7/21 Successful BM yesterday.  Will continue colace and miralax at discharge

## 2023-07-21 NOTE — ASSESSMENT & PLAN NOTE
+ methamphetamine use on UDS  Cessation resources from CM at discharge   D/c suboxone for hypotension  Psych consulted for substance use disorder      Continue to defer opioid pain medications but pt requesting. BP does not tolerate and no IV access at this time.     Gabapentin and antidepressant preferable for tx.  Start lidocaine patch  Norco started over the weekend BID   Central line in place     Pain is better controlled. Since restarted on norco while inpatient will d/c with 7 day supply.  If she has pain outpatient recommend chronic pain management referral.     Pneumonia - bilat  Transaminitis - ? etiology    Plan:  ·	Cont Rocephin 1 gm iv q24hrs will need till 10/2/22

## 2023-07-21 NOTE — PT/OT/SLP PROGRESS
Physical Therapy Treatment    Patient Name:  Rachel Zazueta   MRN:  [id number]    Recommendations:     Discharge Recommendations: nursing facility, basic, nursing facility, skilled  Discharge Equipment Recommendations: none  Barriers to discharge: Inaccessible home and Decreased caregiver support    Assessment:     Rachel Zazueta is a 42 y.o. female admitted with a medical diagnosis of Debility.  She presents with the following impairments/functional limitations: weakness, impaired endurance, impaired self care skills, impaired functional mobility, gait instability, impaired balance, decreased upper extremity function, decreased lower extremity function, pain, decreased safety awareness, decreased ROM, impaired skin, edema.    Rehab Prognosis: Fair; patient would benefit from acute skilled PT services to address these deficits and reach maximum level of function.    Recent Surgery: * No surgery found *      Plan:     During this hospitalization, patient to be seen 5 x/week to address the identified rehab impairments via therapeutic activities, therapeutic exercises and progress toward the following goals:    Plan of Care Expires:  07/21/23    Subjective     Chief Complaint: Noted patient is not crying during LE ROM ex and rolling to sides ex today.   Patient/Family Comments/goals: none stated  Pain/Comfort:         Objective:     Communicated with patient  prior to session.  Patient found HOB elevated with gu catheter, PICC line, telemetry, Other (comments) (bilat heel protector) upon PT entry to room.     General Precautions: Standard, fall  Orthopedic Precautions: N/A  Braces: N/A  Respiratory Status: Room air     Functional Mobility:  Bed Mobility:     Rolling Left:  maximal assistance using bed rail  Rolling Right: maximal assistance using bed rail      AM-PAC 6 CLICK MOBILITY  Turning over in bed (including adjusting bedclothes, sheets and blankets)?: 2  Sitting down on and standing up from a chair with  arms (e.g., wheelchair, bedside commode, etc.): 1  Moving from lying on back to sitting on the side of the bed?: 1  Moving to and from a bed to a chair (including a wheelchair)?: 1  Need to walk in hospital room?: 1  Climbing 3-5 steps with a railing?: 1  Basic Mobility Total Score: 7       Treatment & Education:  Pt performed B LE AAROM - AROM ex on B LE x 10 reps on each at supine f/b rolling to sides x 10 reps on each with maxA and cues using bed rail.    Patient left HOB elevated with all lines intact, call button in reach, and nursing notified..    GOALS:   Multidisciplinary Problems       Physical Therapy Goals          Problem: Physical Therapy    Goal Priority Disciplines Outcome Goal Variances Interventions   Physical Therapy Goal     PT, PT/OT Ongoing, Not Progressing     Description:   Patient will increase functional independence with mobility by performin. Pt able to perform and tolerate B LE ROM ex x 10 reps on each plane to inc mobility, inc body repositioning  and prevent in declining in functions.  2. Inc rolling to sides to minimal assistance and cues  3. Able to perform and tolerate  supine <>sit with Moderate Assistance and cues.  4. Able to perform and tolerate static sit at edge of the bed x ~10 minutes for inc participation and performance with self care activities.  5. Establish home ex program.                          Time Tracking:     PT Received On: 23  PT Start Time: 955     PT Stop Time: 1010  PT Total Time (min): 15 min     Billable Minutes: Therapeutic Activity 15    Treatment Type: Treatment  PT/PTA: PT           2023

## 2023-07-21 NOTE — ASSESSMENT & PLAN NOTE
Component of hypoalbuminemia and suspicion for underlying infection  U/A abnormal but unclear if this is source  Blood cultures pending. If negative at 48-72 hours and vitals remain stable I would feel comfortable with discharge   MAP stable in 70s  7/9---Will give her a 500ml bolus as her systolic dipped to 88 this am    1 unit of pRBC, decrease gabapentin     7/11 BP still hypotensive. 500cc bolus ordered.  D/c suboxone and tizanidine.  Decreased gabapentin.  Echo normal EF.  Cortisol and ACTH wnl.      7/12 Some improvement.  Holding all pain medications except gabapentin.     7/13 MRI c spine limited due to motion but no obvious signs of infection or abscess.  Started midodrine yesterday.  Will increase.  Patient has poor clinical prognosis.    7/14 Improving with midodrine      7/15 stable; monitor     7/19 Stable
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
## 2023-07-21 NOTE — NURSING
[facility] ambulance service notified of patient's need for transport to [facility] in [place], [place]. Estimated Time of Arrival 1 hour.

## 2023-07-21 NOTE — ASSESSMENT & PLAN NOTE
Monitor AST and ALT  Ok to give her tylenol for pain, as long as she is getting less than 2g/day total     Picc line consult placed due to poor IV access; failed  Central line placed.     Remove central line at discharge  She is educated that she will need to f/u with GI/ ID close to NH

## 2023-07-21 NOTE — NURSING
Report called to SHARONA Smith at [facility]. All questions answered. No other questions at this time.

## 2023-07-21 NOTE — PLAN OF CARE
[facility] - Med Surg (3rd Fl)  Discharge Final Note    Primary Care Provider: Dannielle Merino DO    Expected Discharge Date: 7/21/2023    Final Discharge Note (most recent)       Final Note - 07/21/23 1327          Final Note    Assessment Type Final Discharge Note     Anticipated Discharge Disposition Skilled Nursing Facility        Post-Acute Status    Post-Acute Authorization Placement     Post-Acute Placement Status Set-up Complete/Auth obtained     Discharge Delays None known at this time                     Important Message from Medicare             Contact Info       Dannielle Merino DO   Specialty: Family Medicine   Relationship: PCP - General    [facility]  [address] [place] [id number]   Phone: [phone]       Next Steps: Follow up in 1 week(s)    Instructions: f/u wiht PCP and GI/ID referral          Patient discharging to [facility] for SNF.

## 2023-07-27 DIAGNOSIS — K74.60 HEPATIC CIRRHOSIS, UNSPECIFIED HEPATIC CIRRHOSIS TYPE, UNSPECIFIED WHETHER ASCITES PRESENT: ICD-10-CM

## 2023-07-27 DIAGNOSIS — B18.2 HEP C W/O COMA, CHRONIC: Primary | ICD-10-CM

## 2023-08-03 ENCOUNTER — LAB REQUISITION (OUTPATIENT)
Dept: LAB | Facility: HOSPITAL | Age: 43
End: 2023-08-03
Payer: MEDICAID

## 2023-08-03 DIAGNOSIS — D64.9 ANEMIA, UNSPECIFIED: ICD-10-CM

## 2023-08-03 LAB
ALBUMIN SERPL-MCNC: 1.2 G/DL (ref 3.5–5)
ALBUMIN/GLOB SERPL: 0.2 RATIO (ref 1.1–2)
ALP SERPL-CCNC: 150 UNIT/L (ref 40–150)
ALT SERPL-CCNC: 16 UNIT/L (ref 0–55)
AST SERPL-CCNC: 46 UNIT/L (ref 5–34)
BILIRUBIN DIRECT+TOT PNL SERPL-MCNC: 1.5 MG/DL
BUN SERPL-MCNC: 10.6 MG/DL (ref 7–18.7)
CALCIUM SERPL-MCNC: 7.7 MG/DL (ref 8.4–10.2)
CHLORIDE SERPL-SCNC: 106 MMOL/L (ref 98–107)
CO2 SERPL-SCNC: 30 MMOL/L (ref 22–29)
CREAT SERPL-MCNC: 0.47 MG/DL (ref 0.55–1.02)
ERYTHROCYTE [DISTWIDTH] IN BLOOD BY AUTOMATED COUNT: 18.5 % (ref 11.5–17)
GFR SERPLBLD CREATININE-BSD FMLA CKD-EPI: >60 MLS/MIN/1.73/M2
GLOBULIN SER-MCNC: 5.5 GM/DL (ref 2.4–3.5)
GLUCOSE SERPL-MCNC: 85 MG/DL (ref 74–100)
HCT VFR BLD AUTO: 27.7 % (ref 37–47)
HGB BLD-MCNC: 8.3 G/DL (ref 12–16)
MCH RBC QN AUTO: 30.6 PG (ref 27–31)
MCHC RBC AUTO-ENTMCNC: 30 G/DL (ref 33–36)
MCV RBC AUTO: 102.2 FL (ref 80–94)
NRBC BLD AUTO-RTO: 0 %
PLATELET # BLD AUTO: 98 X10(3)/MCL (ref 130–400)
PMV BLD AUTO: 10.1 FL (ref 7.4–10.4)
POTASSIUM SERPL-SCNC: 4.2 MMOL/L (ref 3.5–5.1)
PROT SERPL-MCNC: 6.7 GM/DL (ref 6.4–8.3)
RBC # BLD AUTO: 2.71 X10(6)/MCL (ref 4.2–5.4)
SODIUM SERPL-SCNC: 138 MMOL/L (ref 136–145)
WBC # SPEC AUTO: 2.74 X10(3)/MCL (ref 4.5–11.5)

## 2023-08-03 PROCEDURE — 85027 COMPLETE CBC AUTOMATED: CPT | Performed by: INTERNAL MEDICINE

## 2023-08-03 PROCEDURE — 80053 COMPREHEN METABOLIC PANEL: CPT | Performed by: INTERNAL MEDICINE

## 2023-08-10 ENCOUNTER — PATIENT MESSAGE (OUTPATIENT)
Dept: ADMINISTRATIVE | Facility: HOSPITAL | Age: 43
End: 2023-08-10
Payer: MEDICAID

## 2023-08-14 ENCOUNTER — LAB REQUISITION (OUTPATIENT)
Dept: LAB | Facility: HOSPITAL | Age: 43
End: 2023-08-14
Payer: MEDICAID

## 2023-08-14 DIAGNOSIS — N18.9 CHRONIC KIDNEY DISEASE, UNSPECIFIED: ICD-10-CM

## 2023-08-14 DIAGNOSIS — D64.9 ANEMIA, UNSPECIFIED: ICD-10-CM

## 2023-08-14 LAB
ALBUMIN SERPL-MCNC: 1.4 G/DL (ref 3.5–5)
ALBUMIN/GLOB SERPL: 0.3 RATIO (ref 1.1–2)
ALP SERPL-CCNC: 144 UNIT/L (ref 40–150)
ALT SERPL-CCNC: 18 UNIT/L (ref 0–55)
AST SERPL-CCNC: 43 UNIT/L (ref 5–34)
BASOPHILS # BLD AUTO: 0.02 X10(3)/MCL
BASOPHILS NFR BLD AUTO: 0.9 %
BILIRUB SERPL-MCNC: 1.4 MG/DL
BUN SERPL-MCNC: 11.9 MG/DL (ref 7–18.7)
CALCIUM SERPL-MCNC: 7.7 MG/DL (ref 8.4–10.2)
CHLORIDE SERPL-SCNC: 108 MMOL/L (ref 98–107)
CO2 SERPL-SCNC: 25 MMOL/L (ref 22–29)
CREAT SERPL-MCNC: 0.54 MG/DL (ref 0.55–1.02)
EOSINOPHIL # BLD AUTO: 0.1 X10(3)/MCL (ref 0–0.9)
EOSINOPHIL NFR BLD AUTO: 4.6 %
ERYTHROCYTE [DISTWIDTH] IN BLOOD BY AUTOMATED COUNT: 17 % (ref 11.5–17)
GFR SERPLBLD CREATININE-BSD FMLA CKD-EPI: >60 MLS/MIN/1.73/M2
GLOBULIN SER-MCNC: 5.5 GM/DL (ref 2.4–3.5)
GLUCOSE SERPL-MCNC: 98 MG/DL (ref 74–100)
HCT VFR BLD AUTO: 24.5 % (ref 37–47)
HGB BLD-MCNC: 7.5 G/DL (ref 12–16)
IMM GRANULOCYTES # BLD AUTO: 0.02 X10(3)/MCL (ref 0–0.04)
IMM GRANULOCYTES NFR BLD AUTO: 0.9 %
LYMPHOCYTES # BLD AUTO: 0.41 X10(3)/MCL (ref 0.6–4.6)
LYMPHOCYTES NFR BLD AUTO: 18.7 %
MCH RBC QN AUTO: 30.9 PG (ref 27–31)
MCHC RBC AUTO-ENTMCNC: 30.6 G/DL (ref 33–36)
MCV RBC AUTO: 100.8 FL (ref 80–94)
MONOCYTES # BLD AUTO: 0.22 X10(3)/MCL (ref 0.1–1.3)
MONOCYTES NFR BLD AUTO: 10 %
NEUTROPHILS # BLD AUTO: 1.42 X10(3)/MCL (ref 2.1–9.2)
NEUTROPHILS NFR BLD AUTO: 64.9 %
NRBC BLD AUTO-RTO: 0 %
PLATELET # BLD AUTO: 85 X10(3)/MCL (ref 130–400)
PMV BLD AUTO: 8.8 FL (ref 7.4–10.4)
POTASSIUM SERPL-SCNC: 3.9 MMOL/L (ref 3.5–5.1)
PROT SERPL-MCNC: 6.9 GM/DL (ref 6.4–8.3)
RBC # BLD AUTO: 2.43 X10(6)/MCL (ref 4.2–5.4)
SODIUM SERPL-SCNC: 138 MMOL/L (ref 136–145)
WBC # SPEC AUTO: 2.19 X10(3)/MCL (ref 4.5–11.5)

## 2023-08-14 PROCEDURE — 85025 COMPLETE CBC W/AUTO DIFF WBC: CPT | Performed by: INTERNAL MEDICINE

## 2023-08-14 PROCEDURE — 80053 COMPREHEN METABOLIC PANEL: CPT | Performed by: INTERNAL MEDICINE

## 2023-08-15 ENCOUNTER — LAB REQUISITION (OUTPATIENT)
Dept: LAB | Facility: HOSPITAL | Age: 43
End: 2023-08-15
Payer: MEDICAID

## 2023-08-15 DIAGNOSIS — D51.9 VITAMIN B12 DEFICIENCY ANEMIA, UNSPECIFIED: ICD-10-CM

## 2023-08-15 DIAGNOSIS — E61.1 IRON DEFICIENCY: ICD-10-CM

## 2023-08-15 DIAGNOSIS — E53.1 PYRIDOXINE DEFICIENCY: ICD-10-CM

## 2023-08-15 LAB
FERRITIN SERPL-MCNC: 198.63 NG/ML (ref 4.63–204)
IRON SERPL-MCNC: 67 UG/DL (ref 50–170)
VIT B12 SERPL-MCNC: 803 PG/ML (ref 213–816)

## 2023-08-15 PROCEDURE — 82728 ASSAY OF FERRITIN: CPT | Performed by: INTERNAL MEDICINE

## 2023-08-15 PROCEDURE — 83540 ASSAY OF IRON: CPT | Performed by: INTERNAL MEDICINE

## 2023-08-15 PROCEDURE — 82607 VITAMIN B-12: CPT | Performed by: INTERNAL MEDICINE

## 2023-08-16 ENCOUNTER — LAB REQUISITION (OUTPATIENT)
Dept: LAB | Facility: HOSPITAL | Age: 43
End: 2023-08-16
Payer: MEDICAID

## 2023-08-16 DIAGNOSIS — E53.1 PYRIDOXINE DEFICIENCY: ICD-10-CM

## 2023-08-16 PROCEDURE — 84207 ASSAY OF VITAMIN B-6: CPT | Performed by: INTERNAL MEDICINE

## 2023-08-18 ENCOUNTER — TELEPHONE (OUTPATIENT)
Dept: INFECTIOUS DISEASES | Facility: CLINIC | Age: 43
End: 2023-08-18
Payer: MEDICAID

## 2023-08-18 DIAGNOSIS — K74.60 HEPATIC CIRRHOSIS, UNSPECIFIED HEPATIC CIRRHOSIS TYPE, UNSPECIFIED WHETHER ASCITES PRESENT: Primary | ICD-10-CM

## 2023-08-18 NOTE — TELEPHONE ENCOUNTER
----- Message from Maryjane Spring sent at 8/17/2023  3:01 PM CDT -----  Regarding: Labs  Pt is scheduled for a New Hep C appt on 8/31/23 with Shabana. Please fax lab orders to [facility] [place].

## 2023-08-19 LAB — PYRIDOXAL PHOS SERPL-MCNC: 5 MCG/L (ref 5–50)

## 2023-08-21 ENCOUNTER — LAB REQUISITION (OUTPATIENT)
Dept: LAB | Facility: HOSPITAL | Age: 43
End: 2023-08-21
Payer: MEDICAID

## 2023-08-21 DIAGNOSIS — N18.9 CHRONIC KIDNEY DISEASE, UNSPECIFIED: ICD-10-CM

## 2023-08-21 DIAGNOSIS — D64.9 ANEMIA, UNSPECIFIED: ICD-10-CM

## 2023-08-21 LAB
ALBUMIN SERPL-MCNC: 1.6 G/DL (ref 3.5–5)
ALBUMIN/GLOB SERPL: 0.3 RATIO (ref 1.1–2)
ALP SERPL-CCNC: 141 UNIT/L (ref 40–150)
ALT SERPL-CCNC: 18 UNIT/L (ref 0–55)
AST SERPL-CCNC: 38 UNIT/L (ref 5–34)
BASOPHILS # BLD AUTO: 0.02 X10(3)/MCL
BASOPHILS NFR BLD AUTO: 0.2 %
BILIRUB SERPL-MCNC: 2.9 MG/DL
BUN SERPL-MCNC: 16.3 MG/DL (ref 7–18.7)
CALCIUM SERPL-MCNC: 7.6 MG/DL (ref 8.4–10.2)
CHLORIDE SERPL-SCNC: 104 MMOL/L (ref 98–107)
CO2 SERPL-SCNC: 24 MMOL/L (ref 22–29)
CREAT SERPL-MCNC: 0.54 MG/DL (ref 0.55–1.02)
EOSINOPHIL # BLD AUTO: 0.09 X10(3)/MCL (ref 0–0.9)
EOSINOPHIL NFR BLD AUTO: 1 %
ERYTHROCYTE [DISTWIDTH] IN BLOOD BY AUTOMATED COUNT: 17.7 % (ref 11.5–17)
GFR SERPLBLD CREATININE-BSD FMLA CKD-EPI: >60 MLS/MIN/1.73/M2
GLOBULIN SER-MCNC: 5.4 GM/DL (ref 2.4–3.5)
GLUCOSE SERPL-MCNC: 95 MG/DL (ref 74–100)
HCT VFR BLD AUTO: 25.3 % (ref 37–47)
HGB BLD-MCNC: 7.8 G/DL (ref 12–16)
IMM GRANULOCYTES # BLD AUTO: 0.05 X10(3)/MCL (ref 0–0.04)
IMM GRANULOCYTES NFR BLD AUTO: 0.6 %
LYMPHOCYTES # BLD AUTO: 0.4 X10(3)/MCL (ref 0.6–4.6)
LYMPHOCYTES NFR BLD AUTO: 4.7 %
MCH RBC QN AUTO: 30.1 PG (ref 27–31)
MCHC RBC AUTO-ENTMCNC: 30.8 G/DL (ref 33–36)
MCV RBC AUTO: 97.7 FL (ref 80–94)
MONOCYTES # BLD AUTO: 0.25 X10(3)/MCL (ref 0.1–1.3)
MONOCYTES NFR BLD AUTO: 2.9 %
NEUTROPHILS # BLD AUTO: 7.78 X10(3)/MCL (ref 2.1–9.2)
NEUTROPHILS NFR BLD AUTO: 90.6 %
NRBC BLD AUTO-RTO: 0 %
PLATELET # BLD AUTO: 91 X10(3)/MCL (ref 130–400)
PMV BLD AUTO: 11.5 FL (ref 7.4–10.4)
POTASSIUM SERPL-SCNC: 3.7 MMOL/L (ref 3.5–5.1)
PROT SERPL-MCNC: 7 GM/DL (ref 6.4–8.3)
RBC # BLD AUTO: 2.59 X10(6)/MCL (ref 4.2–5.4)
SODIUM SERPL-SCNC: 133 MMOL/L (ref 136–145)
WBC # SPEC AUTO: 8.59 X10(3)/MCL (ref 4.5–11.5)

## 2023-08-21 PROCEDURE — 85025 COMPLETE CBC W/AUTO DIFF WBC: CPT | Performed by: INTERNAL MEDICINE

## 2023-08-21 PROCEDURE — 80053 COMPREHEN METABOLIC PANEL: CPT | Performed by: INTERNAL MEDICINE

## 2023-08-23 ENCOUNTER — OFFICE VISIT (OUTPATIENT)
Dept: HEPATOLOGY | Facility: CLINIC | Age: 43
End: 2023-08-23
Payer: MEDICAID

## 2023-08-23 VITALS
OXYGEN SATURATION: 98 % | HEIGHT: 67 IN | BODY MASS INDEX: 41.05 KG/M2 | SYSTOLIC BLOOD PRESSURE: 112 MMHG | HEART RATE: 90 BPM | DIASTOLIC BLOOD PRESSURE: 53 MMHG

## 2023-08-23 DIAGNOSIS — B18.2 HEP C W/O COMA, CHRONIC: Primary | ICD-10-CM

## 2023-08-23 DIAGNOSIS — Z16.12 INFECTION DUE TO ESBL-PRODUCING ESCHERICHIA COLI: ICD-10-CM

## 2023-08-23 DIAGNOSIS — A49.8 INFECTION DUE TO ESBL-PRODUCING ESCHERICHIA COLI: ICD-10-CM

## 2023-08-23 DIAGNOSIS — B19.20 DECOMPENSATED HCV CIRRHOSIS: ICD-10-CM

## 2023-08-23 DIAGNOSIS — K74.69 DECOMPENSATED HCV CIRRHOSIS: ICD-10-CM

## 2023-08-23 PROCEDURE — 3008F PR BODY MASS INDEX (BMI) DOCUMENTED: ICD-10-PCS | Mod: CPTII,,, | Performed by: INTERNAL MEDICINE

## 2023-08-23 PROCEDURE — 99999 PR PBB SHADOW E&M-EST. PATIENT-LVL V: CPT | Mod: PBBFAC,,, | Performed by: INTERNAL MEDICINE

## 2023-08-23 PROCEDURE — 1159F PR MEDICATION LIST DOCUMENTED IN MEDICAL RECORD: ICD-10-PCS | Mod: CPTII,,, | Performed by: INTERNAL MEDICINE

## 2023-08-23 PROCEDURE — 3046F PR MOST RECENT HEMOGLOBIN A1C LEVEL > 9.0%: ICD-10-PCS | Mod: CPTII,,, | Performed by: INTERNAL MEDICINE

## 2023-08-23 PROCEDURE — 99215 OFFICE O/P EST HI 40 MIN: CPT | Mod: S$PBB,,, | Performed by: INTERNAL MEDICINE

## 2023-08-23 PROCEDURE — 3078F DIAST BP <80 MM HG: CPT | Mod: CPTII,,, | Performed by: INTERNAL MEDICINE

## 2023-08-23 PROCEDURE — 3046F HEMOGLOBIN A1C LEVEL >9.0%: CPT | Mod: CPTII,,, | Performed by: INTERNAL MEDICINE

## 2023-08-23 PROCEDURE — 3074F SYST BP LT 130 MM HG: CPT | Mod: CPTII,,, | Performed by: INTERNAL MEDICINE

## 2023-08-23 PROCEDURE — 99215 PR OFFICE/OUTPT VISIT, EST, LEVL V, 40-54 MIN: ICD-10-PCS | Mod: S$PBB,,, | Performed by: INTERNAL MEDICINE

## 2023-08-23 PROCEDURE — 3078F PR MOST RECENT DIASTOLIC BLOOD PRESSURE < 80 MM HG: ICD-10-PCS | Mod: CPTII,,, | Performed by: INTERNAL MEDICINE

## 2023-08-23 PROCEDURE — 3008F BODY MASS INDEX DOCD: CPT | Mod: CPTII,,, | Performed by: INTERNAL MEDICINE

## 2023-08-23 PROCEDURE — 1159F MED LIST DOCD IN RCRD: CPT | Mod: CPTII,,, | Performed by: INTERNAL MEDICINE

## 2023-08-23 PROCEDURE — 99215 OFFICE O/P EST HI 40 MIN: CPT | Mod: PBBFAC | Performed by: INTERNAL MEDICINE

## 2023-08-23 PROCEDURE — 99999 PR PBB SHADOW E&M-EST. PATIENT-LVL V: ICD-10-PCS | Mod: PBBFAC,,, | Performed by: INTERNAL MEDICINE

## 2023-08-23 PROCEDURE — 3074F PR MOST RECENT SYSTOLIC BLOOD PRESSURE < 130 MM HG: ICD-10-PCS | Mod: CPTII,,, | Performed by: INTERNAL MEDICINE

## 2023-08-23 RX ORDER — ASCORBIC ACID 500 MG
500 TABLET ORAL DAILY
COMMUNITY

## 2023-08-23 RX ORDER — ZINC SULFATE 50(220)MG
220 CAPSULE ORAL 3 TIMES DAILY
COMMUNITY

## 2023-08-23 RX ORDER — FERROUS FUMARATE 324(106)MG
TABLET ORAL
COMMUNITY

## 2023-08-23 RX ORDER — LIDOCAINE HYDROCHLORIDE 30 MG/G
CREAM TOPICAL
COMMUNITY
Start: 2023-08-21

## 2023-08-23 RX ORDER — AMINO ACIDS/PROTEIN HYDROLYS 15G-100/30
30 LIQUID (ML) ORAL
COMMUNITY

## 2023-08-23 RX ORDER — SPIRONOLACTONE 25 MG/1
25 TABLET ORAL
COMMUNITY
Start: 2023-07-28

## 2023-08-23 RX ORDER — BENZONATATE 100 MG/1
100 CAPSULE ORAL 3 TIMES DAILY PRN
COMMUNITY

## 2023-08-23 RX ORDER — LACTULOSE 10 G/15ML
SOLUTION ORAL; RECTAL
COMMUNITY
Start: 2023-07-28

## 2023-08-23 RX ORDER — ERGOCALCIFEROL 1.25 MG/1
50000 CAPSULE ORAL
COMMUNITY
Start: 2023-05-05

## 2023-08-23 RX ORDER — HYDROCODONE BITARTRATE AND ACETAMINOPHEN 5; 325 MG/1; MG/1
1 TABLET ORAL 2 TIMES DAILY PRN
COMMUNITY
Start: 2023-08-04

## 2023-08-23 RX ORDER — OXYBUTYNIN CHLORIDE 5 MG/1
5 TABLET ORAL 2 TIMES DAILY
COMMUNITY
Start: 2023-08-02

## 2023-08-23 RX ORDER — BISACODYL 10 MG
10 SUPPOSITORY, RECTAL RECTAL DAILY PRN
COMMUNITY

## 2023-08-23 RX ORDER — VELPATASVIR AND SOFOSBUVIR 100; 400 MG/1; MG/1
1 TABLET, FILM COATED ORAL DAILY
Qty: 28 TABLET | Refills: 5 | Status: ACTIVE | OUTPATIENT
Start: 2023-08-23 | End: 2024-02-14 | Stop reason: SDUPTHER

## 2023-08-23 RX ORDER — CHOLECALCIFEROL (VITAMIN D3) 25 MCG
1 TABLET ORAL EVERY MORNING
COMMUNITY

## 2023-08-23 RX ORDER — DICLOFENAC SODIUM 10 MG/G
2 GEL TOPICAL 4 TIMES DAILY
COMMUNITY

## 2023-08-23 RX ORDER — DULOXETIN HYDROCHLORIDE 30 MG/1
30 CAPSULE, DELAYED RELEASE ORAL
COMMUNITY
Start: 2023-08-16

## 2023-08-23 RX ORDER — FOLIC ACID 1 MG/1
1000 TABLET ORAL
COMMUNITY
Start: 2023-08-07

## 2023-08-23 RX ORDER — BUSPIRONE HYDROCHLORIDE 5 MG/1
5 TABLET ORAL 3 TIMES DAILY
COMMUNITY
Start: 2023-08-07

## 2023-08-23 RX ORDER — AMOXICILLIN AND CLAVULANATE POTASSIUM 875; 125 MG/1; MG/1
1 TABLET, FILM COATED ORAL 2 TIMES DAILY
COMMUNITY
Start: 2023-07-28

## 2023-08-23 RX ORDER — BUMETANIDE 1 MG/1
1 TABLET ORAL EVERY MORNING
COMMUNITY
Start: 2023-04-02

## 2023-08-23 RX ORDER — BUMETANIDE 1 MG/1
1 TABLET ORAL
COMMUNITY
Start: 2023-07-28

## 2023-08-23 RX ORDER — DIAZEPAM 5 MG/1
TABLET ORAL
COMMUNITY
Start: 2023-04-04
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
## 2023-08-23 NOTE — Clinical Note
Recommendations: -  Will give Epclusa x 24 weeks due to decompensated cirrhosis.   -  On Protonix 40 mg daily, so dosing would have to be adjusted 4 hrs away (preferably protonix given 4 hr after) Epclusa,   -  HCV RNA quant to be measured 3 months after completing treatment.   -  HCC surveillance with AFP and U/S abd every 6 months - start in Feb 2024. (Dr Donald has ordered one for now).  -  Low salt in the diet, avoid canned, bottled and processed foods. -  Continue current meds -  Avoid alcohol, smoking, sedatives and meds with codeine. -  Avoid high intake of Tylenol (more than 4 extra-strength pills in one day) -  Call us if any bleeding, fevers, confusion, disorientation occur -  Endoscopy: to be done in [facility] -  Return in 3 months from now - can be video visit

## 2023-08-24 ENCOUNTER — EPISODE CHANGES (OUTPATIENT)
Dept: HEPATOLOGY | Facility: CLINIC | Age: 43
End: 2023-08-24

## 2023-08-24 ENCOUNTER — LAB REQUISITION (OUTPATIENT)
Dept: LAB | Facility: HOSPITAL | Age: 43
End: 2023-08-24
Payer: MEDICAID

## 2023-08-24 ENCOUNTER — TELEPHONE (OUTPATIENT)
Dept: HEPATOLOGY | Facility: CLINIC | Age: 43
End: 2023-08-24
Payer: MEDICAID

## 2023-08-24 DIAGNOSIS — B15.9 HEPATITIS A WITHOUT HEPATIC COMA: ICD-10-CM

## 2023-08-24 DIAGNOSIS — B15.0: ICD-10-CM

## 2023-08-24 LAB — HAV IGM SERPL QL IA: NONREACTIVE

## 2023-08-24 PROCEDURE — 86709 HEPATITIS A IGM ANTIBODY: CPT | Performed by: INTERNAL MEDICINE

## 2023-08-24 NOTE — TELEPHONE ENCOUNTER
Hep c episode started.  Patient states that she stays at [facility]/Rehab # [phone] and fax # [phone].  F/u visit scheduled 11/28/23; appt reminder notice mailed.  SVR 12 draw will be scheduled at a later date.

## 2023-08-24 NOTE — TELEPHONE ENCOUNTER
----- Message from Dejah Villegas RN sent at 8/23/2023  5:01 PM CDT -----  Dr Cabral stated she wanted this CC chart to go to Hepatitis C Staff.  Thanks    ----- Message -----  From: Era Cabral MD  Sent: 8/23/2023   3:57 PM CDT  To: [facility] Clinical Staff    Recommendations:  -  Will give Epclusa x 24 weeks due to decompensated cirrhosis.    -  On Protonix 40 mg daily, so dosing would have to be adjusted 4 hrs away (preferably protonix given 4 hr after) Epclusa,    -  HCV RNA quant to be measured 3 months after completing treatment.    -  HCC surveillance with AFP and U/S abd every 6 months - start in Feb 2024. (Dr Donald has ordered one for now).   -  Low salt in the diet, avoid canned, bottled and processed foods.  -  Continue current meds  -  Avoid alcohol, smoking, sedatives and meds with codeine.  -  Avoid high intake of Tylenol (more than 4 extra-strength pills in one day)  -  Call us if any bleeding, fevers, confusion, disorientation occur  -  Endoscopy: to be done in [facility]  -  Return in 3 months from now - can be video visit

## 2023-08-26 ENCOUNTER — LAB REQUISITION (OUTPATIENT)
Dept: LAB | Facility: HOSPITAL | Age: 43
End: 2023-08-26
Payer: MEDICAID

## 2023-08-26 DIAGNOSIS — K76.9 LIVER DISEASE, UNSPECIFIED: ICD-10-CM

## 2023-08-26 LAB — AMMONIA PLAS-MSCNC: 124 UMOL/L (ref 18–72)

## 2023-08-26 PROCEDURE — 82140 ASSAY OF AMMONIA: CPT | Performed by: INTERNAL MEDICINE

## 2023-08-29 PROCEDURE — 82270 OCCULT BLOOD FECES: CPT | Performed by: INTERNAL MEDICINE

## 2023-08-30 ENCOUNTER — TELEPHONE (OUTPATIENT)
Dept: INFECTIOUS DISEASES | Facility: CLINIC | Age: 43
End: 2023-08-30
Payer: MEDICAID

## 2023-08-30 ENCOUNTER — LAB REQUISITION (OUTPATIENT)
Dept: LAB | Facility: HOSPITAL | Age: 43
End: 2023-08-30
Payer: MEDICAID

## 2023-08-30 DIAGNOSIS — R19.5 OTHER FECAL ABNORMALITIES: ICD-10-CM

## 2023-08-30 LAB — HEMOCCULT SP1 STL QL: NEGATIVE

## 2023-08-30 NOTE — TELEPHONE ENCOUNTER
Shabana, do we still need to see this pt she is being seen in [place]: appt was on 8/23/23                                        [facility] Consultation Note     Reason for Consult:  The primary encounter diagnosis was Hep C w/o coma, chronic. Diagnoses of Infection due to ESBL-producing Escherichia coli and Decompensated HCV cirrhosis were also pertinent to this visit.     PCP: Dannielle Merino   [address] [facility] / [facility] [address]

## 2023-08-30 NOTE — TELEPHONE ENCOUNTER
----- Message from Maryjane Spring sent at 8/30/2023  3:08 PM CDT -----  Regarding: Treatment  Shabana rogers pt       Called the pt to confirm the appt on tomorrow. Pt stated that she saw a doctor in [place] last week for the same thing. I canceled her appt but can it be verified that she is receiving treatment.   Pt # [phone]

## 2023-08-30 NOTE — TELEPHONE ENCOUNTER
----- Message from Maryjane Spring sent at 8/30/2023  3:08 PM CDT -----  Regarding: Treatment  Shabana rogers pt       Called the pt to confirm the appt on tomorrow. Pt stated that she saw a doctor in [place] last week for the same thing. I canceled her appt but can it be verified that she is receiving treatment.   Pt # [phone]

## 2023-08-30 NOTE — TELEPHONE ENCOUNTER
Pt does not need to be seen in this clinic. She is being followed and treated in hepatology clinic.

## 2023-08-31 ENCOUNTER — LAB REQUISITION (OUTPATIENT)
Dept: LAB | Facility: HOSPITAL | Age: 43
End: 2023-08-31
Payer: MEDICAID

## 2023-08-31 DIAGNOSIS — N18.9 CHRONIC KIDNEY DISEASE, UNSPECIFIED: ICD-10-CM

## 2023-08-31 DIAGNOSIS — E72.20 DISORDER OF UREA CYCLE METABOLISM, UNSPECIFIED: ICD-10-CM

## 2023-08-31 DIAGNOSIS — D64.9 ANEMIA, UNSPECIFIED: ICD-10-CM

## 2023-08-31 LAB
ALBUMIN SERPL-MCNC: 1.8 G/DL (ref 3.5–5)
ALBUMIN/GLOB SERPL: 0.3 RATIO (ref 1.1–2)
ALP SERPL-CCNC: 144 UNIT/L (ref 40–150)
ALT SERPL-CCNC: 22 UNIT/L (ref 0–55)
AMMONIA PLAS-MSCNC: 129.2 UMOL/L (ref 18–72)
AST SERPL-CCNC: 68 UNIT/L (ref 5–34)
BASOPHILS # BLD AUTO: 0.02 X10(3)/MCL
BASOPHILS NFR BLD AUTO: 0.6 %
BILIRUB SERPL-MCNC: 1.6 MG/DL
BUN SERPL-MCNC: 9.7 MG/DL (ref 7–18.7)
CALCIUM SERPL-MCNC: 7.8 MG/DL (ref 8.4–10.2)
CHLORIDE SERPL-SCNC: 102 MMOL/L (ref 98–107)
CO2 SERPL-SCNC: 29 MMOL/L (ref 22–29)
CREAT SERPL-MCNC: 0.56 MG/DL (ref 0.55–1.02)
EOSINOPHIL # BLD AUTO: 0.12 X10(3)/MCL (ref 0–0.9)
EOSINOPHIL NFR BLD AUTO: 3.5 %
ERYTHROCYTE [DISTWIDTH] IN BLOOD BY AUTOMATED COUNT: 17.5 % (ref 11.5–17)
GFR SERPLBLD CREATININE-BSD FMLA CKD-EPI: >60 MLS/MIN/1.73/M2
GLOBULIN SER-MCNC: 6 GM/DL (ref 2.4–3.5)
GLUCOSE SERPL-MCNC: 81 MG/DL (ref 74–100)
HCT VFR BLD AUTO: 29 % (ref 37–47)
HGB BLD-MCNC: 8.8 G/DL (ref 12–16)
IMM GRANULOCYTES # BLD AUTO: 0.02 X10(3)/MCL (ref 0–0.04)
IMM GRANULOCYTES NFR BLD AUTO: 0.6 %
LYMPHOCYTES # BLD AUTO: 0.49 X10(3)/MCL (ref 0.6–4.6)
LYMPHOCYTES NFR BLD AUTO: 14.3 %
MCH RBC QN AUTO: 30.3 PG (ref 27–31)
MCHC RBC AUTO-ENTMCNC: 30.3 G/DL (ref 33–36)
MCV RBC AUTO: 100 FL (ref 80–94)
MONOCYTES # BLD AUTO: 0.26 X10(3)/MCL (ref 0.1–1.3)
MONOCYTES NFR BLD AUTO: 7.6 %
NEUTROPHILS # BLD AUTO: 2.52 X10(3)/MCL (ref 2.1–9.2)
NEUTROPHILS NFR BLD AUTO: 73.4 %
NRBC BLD AUTO-RTO: 0 %
PLATELET # BLD AUTO: 108 X10(3)/MCL (ref 130–400)
PMV BLD AUTO: 9.7 FL (ref 7.4–10.4)
POTASSIUM SERPL-SCNC: 4.2 MMOL/L (ref 3.5–5.1)
PROT SERPL-MCNC: 7.8 GM/DL (ref 6.4–8.3)
RBC # BLD AUTO: 2.9 X10(6)/MCL (ref 4.2–5.4)
SODIUM SERPL-SCNC: 135 MMOL/L (ref 136–145)
WBC # SPEC AUTO: 3.43 X10(3)/MCL (ref 4.5–11.5)

## 2023-08-31 PROCEDURE — 82140 ASSAY OF AMMONIA: CPT | Performed by: INTERNAL MEDICINE

## 2023-08-31 PROCEDURE — 85025 COMPLETE CBC W/AUTO DIFF WBC: CPT | Performed by: INTERNAL MEDICINE

## 2023-08-31 PROCEDURE — 80053 COMPREHEN METABOLIC PANEL: CPT | Performed by: INTERNAL MEDICINE

## 2023-09-01 ENCOUNTER — LAB REQUISITION (OUTPATIENT)
Dept: LAB | Facility: HOSPITAL | Age: 43
End: 2023-09-01
Payer: MEDICAID

## 2023-09-01 DIAGNOSIS — N39.0 URINARY TRACT INFECTION, SITE NOT SPECIFIED: ICD-10-CM

## 2023-09-01 LAB
AMORPH URATE CRY URNS QL MICRO: ABNORMAL /HPF
APPEARANCE UR: ABNORMAL
BACTERIA #/AREA URNS AUTO: ABNORMAL /HPF
BILIRUB UR QL STRIP.AUTO: NEGATIVE
COLOR UR: YELLOW
GLUCOSE UR QL STRIP.AUTO: NEGATIVE
KETONES UR QL STRIP.AUTO: NEGATIVE
LEUKOCYTE ESTERASE UR QL STRIP.AUTO: ABNORMAL
NITRITE UR QL STRIP.AUTO: NEGATIVE
PH UR STRIP.AUTO: 7 [PH]
PROT UR QL STRIP.AUTO: NEGATIVE
RBC #/AREA URNS AUTO: ABNORMAL /HPF
RBC UR QL AUTO: ABNORMAL
SP GR UR STRIP.AUTO: 1.01 (ref 1–1.03)
SQUAMOUS #/AREA URNS AUTO: ABNORMAL /HPF
UROBILINOGEN UR STRIP-ACNC: 1
WBC #/AREA URNS AUTO: ABNORMAL /HPF

## 2023-09-01 PROCEDURE — 81001 URINALYSIS AUTO W/SCOPE: CPT | Performed by: INTERNAL MEDICINE

## 2023-09-05 ENCOUNTER — LAB REQUISITION (OUTPATIENT)
Dept: LAB | Facility: HOSPITAL | Age: 43
End: 2023-09-05
Payer: MEDICAID

## 2023-09-05 DIAGNOSIS — R74.8 ABNORMAL LEVELS OF OTHER SERUM ENZYMES: ICD-10-CM

## 2023-09-05 LAB — LIPASE SERPL-CCNC: 16 U/L

## 2023-09-05 PROCEDURE — 83690 ASSAY OF LIPASE: CPT | Performed by: INTERNAL MEDICINE

## 2023-09-07 ENCOUNTER — TELEPHONE (OUTPATIENT)
Dept: HEPATOLOGY | Facility: CLINIC | Age: 43
End: 2023-09-07
Payer: MEDICAID

## 2023-09-07 DIAGNOSIS — B18.2 HEP C W/O COMA, CHRONIC: Primary | ICD-10-CM

## 2023-09-07 NOTE — ASSESSMENT & PLAN NOTE
Hx of bone marrow biopsy 2017 that was nondiagnostic.      Recommendations:  - daily labs  - transfuse for Hgb <7  - transfuse for plt <10k or <50k with bleeding    3/3 2 u platelets for platelet count 69 and 1 uprbc. Follow up CBC pending.    Island Pedicle Flap-Requiring Vessel Identification Text: The defect edges were debeveled with a #15 scalpel blade.  Given the location of the defect, shape of the defect and the proximity to free margins an island pedicle advancement flap was deemed most appropriate.  Using a sterile surgical marker, an appropriate advancement flap was drawn, based on the axial vessel mentioned above, incorporating the defect, outlining the appropriate donor tissue and placing the expected incisions within the relaxed skin tension lines where possible.    The area thus outlined was incised deep to adipose tissue with a #15 scalpel blade.  The skin margins were undermined to an appropriate distance in all directions around the primary defect and laterally outward around the island pedicle utilizing iris scissors.  There was minimal undermining beneath the pedicle flap.

## 2023-09-07 NOTE — TELEPHONE ENCOUNTER
Dr. Cabral ordered that patient start Epclusa 1 tablet by mouth once daily, # 28 with 5 refills.  Special note from Dr. Cabral:  On Protonix 40 mg daily, so dosing would have to be adjusted 4 hrs away (preferably protonix given 4 hr after) Epclusa.  [facility] ships to [facility] and they can be contacted at [phone] for refills.     I spoke with Cristina at [facility].    She states that patient will start treatment on 9/8/23.  Anticipated end date of Epclusa:  2/22/24.   Lab draw to determine treatment success ordered 5/22/24.  Patient also scheduled for a virtual visit with Dr. Redd on 11/28/23.  Appointment reminder notice and lab order dated 2/22/24 faxed to [facility] along with this note.

## 2023-09-12 ENCOUNTER — LAB REQUISITION (OUTPATIENT)
Dept: LAB | Facility: HOSPITAL | Age: 43
End: 2023-09-12
Payer: MEDICAID

## 2023-09-12 DIAGNOSIS — N18.9 CHRONIC KIDNEY DISEASE, UNSPECIFIED: ICD-10-CM

## 2023-09-12 DIAGNOSIS — D64.9 ANEMIA, UNSPECIFIED: ICD-10-CM

## 2023-09-12 DIAGNOSIS — K21.9 GASTRO-ESOPHAGEAL REFLUX DISEASE WITHOUT ESOPHAGITIS: ICD-10-CM

## 2023-09-12 LAB
ABS NEUT CALC (OHS): 1.18 X10(3)/MCL (ref 2.1–9.2)
ALBUMIN SERPL-MCNC: 1.8 G/DL (ref 3.5–5)
ALBUMIN/GLOB SERPL: 0.4 RATIO (ref 1.1–2)
ALP SERPL-CCNC: 126 UNIT/L (ref 40–150)
ALT SERPL-CCNC: 13 UNIT/L (ref 0–55)
ANISOCYTOSIS BLD QL SMEAR: ABNORMAL
AST SERPL-CCNC: 33 UNIT/L (ref 5–34)
BILIRUB SERPL-MCNC: 1.2 MG/DL
BUN SERPL-MCNC: 16.2 MG/DL (ref 7–18.7)
CALCIUM SERPL-MCNC: 7.9 MG/DL (ref 8.4–10.2)
CHLORIDE SERPL-SCNC: 111 MMOL/L (ref 98–107)
CO2 SERPL-SCNC: 26 MMOL/L (ref 22–29)
CREAT SERPL-MCNC: 0.55 MG/DL (ref 0.55–1.02)
EOSINOPHIL NFR BLD MANUAL: 0.04 X10(3)/MCL (ref 0–0.9)
EOSINOPHIL NFR BLD MANUAL: 2 % (ref 0–8)
ERYTHROCYTE [DISTWIDTH] IN BLOOD BY AUTOMATED COUNT: 16.7 % (ref 11.5–17)
GFR SERPLBLD CREATININE-BSD FMLA CKD-EPI: >60 MLS/MIN/1.73/M2
GLOBULIN SER-MCNC: 4.8 GM/DL (ref 2.4–3.5)
GLUCOSE SERPL-MCNC: 83 MG/DL (ref 74–100)
HCT VFR BLD AUTO: 26.4 % (ref 37–47)
HGB BLD-MCNC: 8.2 G/DL (ref 12–16)
HYPOCHROMIA BLD QL SMEAR: ABNORMAL
LYMPHOCYTES NFR BLD MANUAL: 0.4 X10(3)/MCL
LYMPHOCYTES NFR BLD MANUAL: 23 % (ref 13–40)
MACROCYTES BLD QL SMEAR: ABNORMAL
MCH RBC QN AUTO: 30.5 PG (ref 27–31)
MCHC RBC AUTO-ENTMCNC: 31.1 G/DL (ref 33–36)
MCV RBC AUTO: 98.1 FL (ref 80–94)
MONOCYTES NFR BLD MANUAL: 0.14 X10(3)/MCL (ref 0.1–1.3)
MONOCYTES NFR BLD MANUAL: 8 % (ref 2–11)
NEUTROPHILS NFR BLD MANUAL: 59 % (ref 47–80)
NEUTS BAND NFR BLD MANUAL: 8 % (ref 0–11)
NRBC BLD AUTO-RTO: 0 %
PLATELET # BLD AUTO: 91 X10(3)/MCL (ref 130–400)
PLATELET # BLD EST: ABNORMAL 10*3/UL
PMV BLD AUTO: 10.5 FL (ref 7.4–10.4)
POTASSIUM SERPL-SCNC: 4.2 MMOL/L (ref 3.5–5.1)
PROT SERPL-MCNC: 6.6 GM/DL (ref 6.4–8.3)
RBC # BLD AUTO: 2.69 X10(6)/MCL (ref 4.2–5.4)
RBC MORPH BLD: ABNORMAL
SODIUM SERPL-SCNC: 141 MMOL/L (ref 136–145)
WBC # SPEC AUTO: 1.76 X10(3)/MCL (ref 4.5–11.5)

## 2023-09-12 PROCEDURE — 85027 COMPLETE CBC AUTOMATED: CPT | Performed by: INTERNAL MEDICINE

## 2023-09-12 PROCEDURE — 80053 COMPREHEN METABOLIC PANEL: CPT | Performed by: INTERNAL MEDICINE

## 2023-09-20 ENCOUNTER — LAB REQUISITION (OUTPATIENT)
Dept: LAB | Facility: HOSPITAL | Age: 43
End: 2023-09-20
Payer: MEDICAID

## 2023-09-20 DIAGNOSIS — D64.9 ANEMIA, UNSPECIFIED: ICD-10-CM

## 2023-09-20 DIAGNOSIS — N18.9 CHRONIC KIDNEY DISEASE, UNSPECIFIED: ICD-10-CM

## 2023-09-20 LAB
ALBUMIN SERPL-MCNC: 1.8 G/DL (ref 3.5–5)
ALBUMIN/GLOB SERPL: 0.4 RATIO (ref 1.1–2)
ALP SERPL-CCNC: 122 UNIT/L (ref 40–150)
ALT SERPL-CCNC: 11 UNIT/L (ref 0–55)
AST SERPL-CCNC: 25 UNIT/L (ref 5–34)
BASOPHILS # BLD AUTO: 0.02 X10(3)/MCL
BASOPHILS NFR BLD AUTO: 0.8 %
BILIRUB SERPL-MCNC: 1.3 MG/DL
BUN SERPL-MCNC: 8.3 MG/DL (ref 7–18.7)
CALCIUM SERPL-MCNC: 7.8 MG/DL (ref 8.4–10.2)
CHLORIDE SERPL-SCNC: 108 MMOL/L (ref 98–107)
CO2 SERPL-SCNC: 27 MMOL/L (ref 22–29)
CREAT SERPL-MCNC: 0.52 MG/DL (ref 0.55–1.02)
EOSINOPHIL # BLD AUTO: 0.08 X10(3)/MCL (ref 0–0.9)
EOSINOPHIL NFR BLD AUTO: 3.3 %
ERYTHROCYTE [DISTWIDTH] IN BLOOD BY AUTOMATED COUNT: 16 % (ref 11.5–17)
GFR SERPLBLD CREATININE-BSD FMLA CKD-EPI: >60 MLS/MIN/1.73/M2
GLOBULIN SER-MCNC: 4.7 GM/DL (ref 2.4–3.5)
GLUCOSE SERPL-MCNC: 84 MG/DL (ref 74–100)
HCT VFR BLD AUTO: 25 % (ref 37–47)
HGB BLD-MCNC: 7.9 G/DL (ref 12–16)
IMM GRANULOCYTES # BLD AUTO: 0.02 X10(3)/MCL (ref 0–0.04)
IMM GRANULOCYTES NFR BLD AUTO: 0.8 %
LYMPHOCYTES # BLD AUTO: 0.45 X10(3)/MCL (ref 0.6–4.6)
LYMPHOCYTES NFR BLD AUTO: 18.8 %
MCH RBC QN AUTO: 30.3 PG (ref 27–31)
MCHC RBC AUTO-ENTMCNC: 31.6 G/DL (ref 33–36)
MCV RBC AUTO: 95.8 FL (ref 80–94)
MONOCYTES # BLD AUTO: 0.21 X10(3)/MCL (ref 0.1–1.3)
MONOCYTES NFR BLD AUTO: 8.8 %
NEUTROPHILS # BLD AUTO: 1.62 X10(3)/MCL (ref 2.1–9.2)
NEUTROPHILS NFR BLD AUTO: 67.5 %
NRBC BLD AUTO-RTO: 0 %
PLATELET # BLD AUTO: 84 X10(3)/MCL (ref 130–400)
PMV BLD AUTO: 9.9 FL (ref 7.4–10.4)
POTASSIUM SERPL-SCNC: 4.2 MMOL/L (ref 3.5–5.1)
PROT SERPL-MCNC: 6.5 GM/DL (ref 6.4–8.3)
RBC # BLD AUTO: 2.61 X10(6)/MCL (ref 4.2–5.4)
SODIUM SERPL-SCNC: 140 MMOL/L (ref 136–145)
WBC # SPEC AUTO: 2.4 X10(3)/MCL (ref 4.5–11.5)

## 2023-09-20 PROCEDURE — 85025 COMPLETE CBC W/AUTO DIFF WBC: CPT | Performed by: INTERNAL MEDICINE

## 2023-09-20 PROCEDURE — 80053 COMPREHEN METABOLIC PANEL: CPT | Performed by: INTERNAL MEDICINE

## 2023-10-03 ENCOUNTER — PATIENT OUTREACH (OUTPATIENT)
Dept: ADMINISTRATIVE | Facility: HOSPITAL | Age: 43
End: 2023-10-03
Payer: MEDICAID

## 2023-10-03 DIAGNOSIS — Z12.31 BREAST CANCER SCREENING BY MAMMOGRAM: Primary | ICD-10-CM

## 2023-10-03 NOTE — PROGRESS NOTES
Chart reviewed, immunization record updated.  No new results noted on [facility] or [facility] web site.  Care Everywhere updated.   Patient care coordination note  LOV with PCP 9/29/2022.  Attempted to contact patient to discuss Cervical Cancer Screening and schedule [facility] and routine PCP visit, unable to contact. (Mailbox is full and cannot accept any messages at this time)

## 2023-10-25 ENCOUNTER — PATIENT OUTREACH (OUTPATIENT)
Dept: ADMINISTRATIVE | Facility: HOSPITAL | Age: 43
End: 2023-10-25
Payer: MEDICAID

## 2023-11-15 ENCOUNTER — LAB REQUISITION (OUTPATIENT)
Dept: LAB | Facility: HOSPITAL | Age: 43
End: 2023-11-15
Payer: MEDICAID

## 2023-11-15 DIAGNOSIS — R82.5 ELEVATED URINE LEVELS OF DRUGS, MEDICAMENTS AND BIOLOGICAL SUBSTANCES: ICD-10-CM

## 2023-11-15 DIAGNOSIS — Z01.89 ENCOUNTER FOR OTHER SPECIFIED SPECIAL EXAMINATIONS: ICD-10-CM

## 2023-11-15 DIAGNOSIS — R80.0 ISOLATED PROTEINURIA: ICD-10-CM

## 2023-11-15 LAB
AMPHET UR QL SCN: NEGATIVE
APPEARANCE UR: ABNORMAL
BACTERIA #/AREA URNS AUTO: ABNORMAL /HPF
BARBITURATE SCN PRESENT UR: NEGATIVE
BENZODIAZ UR QL SCN: POSITIVE
BILIRUB UR QL STRIP.AUTO: ABNORMAL
CANNABINOIDS UR QL SCN: POSITIVE
COCAINE UR QL SCN: NEGATIVE
COLOR UR AUTO: ABNORMAL
FENTANYL UR QL SCN: NEGATIVE
GLUCOSE UR QL STRIP.AUTO: NEGATIVE
KETONES UR QL STRIP.AUTO: ABNORMAL
LEUKOCYTE ESTERASE UR QL STRIP.AUTO: ABNORMAL
MDMA UR QL SCN: NEGATIVE
NITRITE UR QL STRIP.AUTO: POSITIVE
OPIATES UR QL SCN: POSITIVE
PCP UR QL: NEGATIVE
PH UR STRIP.AUTO: 7.5 [PH]
PH UR: 7.5 [PH] (ref 3–11)
PROT UR QL STRIP.AUTO: 100
RBC #/AREA URNS AUTO: >100 /HPF
RBC UR QL AUTO: ABNORMAL
SP GR UR STRIP.AUTO: 1.02 (ref 1–1.03)
SPECIFIC GRAVITY, URINE AUTO (.000) (OHS): 1.02 (ref 1–1.03)
SQUAMOUS #/AREA URNS AUTO: ABNORMAL /HPF
UROBILINOGEN UR STRIP-ACNC: 2
WBC #/AREA URNS AUTO: ABNORMAL /HPF
YEAST URNS QL MICRO: ABNORMAL /HPF

## 2023-11-15 PROCEDURE — 80307 DRUG TEST PRSMV CHEM ANLYZR: CPT | Performed by: INTERNAL MEDICINE

## 2023-11-15 PROCEDURE — 87086 URINE CULTURE/COLONY COUNT: CPT | Performed by: INTERNAL MEDICINE

## 2023-11-15 PROCEDURE — 81001 URINALYSIS AUTO W/SCOPE: CPT | Mod: XB | Performed by: INTERNAL MEDICINE

## 2023-11-18 LAB
BACTERIA UR CULT: ABNORMAL

## 2023-11-27 ENCOUNTER — PATIENT OUTREACH (OUTPATIENT)
Dept: ADMINISTRATIVE | Facility: HOSPITAL | Age: 43
End: 2023-11-27
Payer: MEDICAID

## 2023-12-13 ENCOUNTER — TELEPHONE (OUTPATIENT)
Dept: HEPATOLOGY | Facility: CLINIC | Age: 43
End: 2023-12-13
Payer: MEDICAID

## 2023-12-13 NOTE — TELEPHONE ENCOUNTER
Epclusa started 9/6/24 for 24 wks.  Unsure if patient taking med.  Appt with Dr. Cabral scheduled was 11/28/23 cancelled I assume by patient.  I left a msg with staff at [facility] asking that Nurse Connor call hepatology back.

## 2023-12-13 NOTE — TELEPHONE ENCOUNTER
----- Message from Agnes Koroma MA sent at 12/13/2023  1:46 PM CST -----  Regarding: FW: Speak to staff/ Rx refill/ appt  Contact: Geeta  Is this something you can handle?    ----- Message -----  From: Rajani Brown  Sent: 12/13/2023   1:34 PM CST  To: Misha [facility]ard Staff  Subject: Speak to staff/ Rx refill/ appt                  Regarding: Speak to staff        Name Of Caller:  Geeta        Contact Preference: [phone]         Nature of call:  Geeta( RN)  from [facility]  is calling to speak to staff regarding, an appt for the pt , and questions in regards to the following medication and how to proceed with it, because pt was on hospice, but is currently off. Please advise.  Requesting a call back.      sofosbuvir-velpatasvir 400-100 mg Tab

## 2023-12-14 ENCOUNTER — LAB REQUISITION (OUTPATIENT)
Dept: LAB | Facility: HOSPITAL | Age: 43
End: 2023-12-14
Payer: MEDICAID

## 2023-12-14 DIAGNOSIS — D64.9 ANEMIA, UNSPECIFIED: ICD-10-CM

## 2023-12-14 LAB
ALBUMIN SERPL-MCNC: 2 G/DL (ref 3.5–5)
ALBUMIN/GLOB SERPL: 0.4 RATIO (ref 1.1–2)
ALP SERPL-CCNC: 114 UNIT/L (ref 40–150)
ALT SERPL-CCNC: 12 UNIT/L (ref 0–55)
AMMONIA PLAS-MSCNC: 106.4 UMOL/L (ref 18–72)
AST SERPL-CCNC: 38 UNIT/L (ref 5–34)
BASOPHILS # BLD AUTO: 0.02 X10(3)/MCL
BASOPHILS NFR BLD AUTO: 0.6 %
BILIRUB SERPL-MCNC: 2.2 MG/DL
BUN SERPL-MCNC: 7 MG/DL (ref 7–18.7)
CALCIUM SERPL-MCNC: 7.3 MG/DL (ref 8.4–10.2)
CHLORIDE SERPL-SCNC: 107 MMOL/L (ref 98–107)
CO2 SERPL-SCNC: 25 MMOL/L (ref 22–29)
CREAT SERPL-MCNC: 0.52 MG/DL (ref 0.55–1.02)
EOSINOPHIL # BLD AUTO: 0.1 X10(3)/MCL (ref 0–0.9)
EOSINOPHIL NFR BLD AUTO: 2.9 %
ERYTHROCYTE [DISTWIDTH] IN BLOOD BY AUTOMATED COUNT: 17.2 % (ref 11.5–17)
GFR SERPLBLD CREATININE-BSD FMLA CKD-EPI: >60 MLS/MIN/1.73/M2
GLOBULIN SER-MCNC: 5 GM/DL (ref 2.4–3.5)
GLUCOSE SERPL-MCNC: 105 MG/DL (ref 74–100)
HCT VFR BLD AUTO: 27 % (ref 37–47)
HGB BLD-MCNC: 8.5 G/DL (ref 12–16)
IMM GRANULOCYTES # BLD AUTO: 0.02 X10(3)/MCL (ref 0–0.04)
IMM GRANULOCYTES NFR BLD AUTO: 0.6 %
LYMPHOCYTES # BLD AUTO: 0.38 X10(3)/MCL (ref 0.6–4.6)
LYMPHOCYTES NFR BLD AUTO: 11.1 %
MCH RBC QN AUTO: 28.8 PG (ref 27–31)
MCHC RBC AUTO-ENTMCNC: 31.5 G/DL (ref 33–36)
MCV RBC AUTO: 91.5 FL (ref 80–94)
MONOCYTES # BLD AUTO: 0.21 X10(3)/MCL (ref 0.1–1.3)
MONOCYTES NFR BLD AUTO: 6.1 %
NEUTROPHILS # BLD AUTO: 2.7 X10(3)/MCL (ref 2.1–9.2)
NEUTROPHILS NFR BLD AUTO: 78.7 %
NRBC BLD AUTO-RTO: 0 %
PLATELET # BLD AUTO: 82 X10(3)/MCL (ref 130–400)
PMV BLD AUTO: 12 FL (ref 7.4–10.4)
POTASSIUM SERPL-SCNC: 4 MMOL/L (ref 3.5–5.1)
PROT SERPL-MCNC: 7 GM/DL (ref 6.4–8.3)
RBC # BLD AUTO: 2.95 X10(6)/MCL (ref 4.2–5.4)
SODIUM SERPL-SCNC: 135 MMOL/L (ref 136–145)
WBC # SPEC AUTO: 3.43 X10(3)/MCL (ref 4.5–11.5)

## 2023-12-14 PROCEDURE — 82140 ASSAY OF AMMONIA: CPT | Performed by: INTERNAL MEDICINE

## 2023-12-14 PROCEDURE — 85025 COMPLETE CBC W/AUTO DIFF WBC: CPT | Performed by: INTERNAL MEDICINE

## 2023-12-14 PROCEDURE — 80053 COMPREHEN METABOLIC PANEL: CPT | Performed by: INTERNAL MEDICINE

## 2023-12-21 ENCOUNTER — TELEPHONE (OUTPATIENT)
Dept: HEPATOLOGY | Facility: CLINIC | Age: 43
End: 2023-12-21
Payer: MEDICAID

## 2023-12-21 NOTE — TELEPHONE ENCOUNTER
"----- Message from Agnes Koroma MA sent at 12/20/2023  4:14 PM CST -----  Pt was previously on hospice but is no longer. Pt would like to resume level of care for Hep C per nurse  ----- Message -----  From: Tha Pool  Sent: 12/20/2023   2:34 PM CST  To: Misha [facility]ard Staff    Reschedule Existing Appointment      Appt Date: 11/28    Type of appt: ESTABLISHED PATIENT - VIRTUAL [[id number]]    Physician: Misha    Reason for rescheduling? Stating pt is eligible to return to former treatment plan; Requesting to r/s for soonest available    Caller:  Cristina ([facility])    Contact Preference: [phone]      Additional Information: Also requesting to discuss pt returning back to former medication treatment/ overall plan of care    "Thank you for all that you do for our patients"        "

## 2023-12-21 NOTE — TELEPHONE ENCOUNTER
Dr. Cabral ordered that patient take Epclusa X 24 weeks.  [facility] shipped med and patient started treatment on 9/6/23.     I spoke with nurse Cristina.  She states that patient took Epclusa for about 3 to 4 wks and then was placed in hospice.  Hep c treatment stopped because patient had given up and was refusing meds and her condition deteriorated.     Patient has since become complaint with treatment and was removed from hospice and now wants to began hep c treatment again. I told Cristina that a msg would be sent to Dr. Cabral to see if it's safe for patient to restart medication with liver decompensation.  Patient scheduled for a virtual visit with Dr. Cabral on 1/22/24 and placed on the waiting list.

## 2024-01-03 ENCOUNTER — LAB REQUISITION (OUTPATIENT)
Dept: LAB | Facility: HOSPITAL | Age: 44
End: 2024-01-03
Payer: MEDICAID

## 2024-01-03 DIAGNOSIS — D64.9 ANEMIA, UNSPECIFIED: ICD-10-CM

## 2024-01-03 LAB
ALBUMIN SERPL-MCNC: 1.7 G/DL (ref 3.5–5)
ALBUMIN/GLOB SERPL: 0.3 RATIO (ref 1.1–2)
ALP SERPL-CCNC: 113 UNIT/L (ref 40–150)
ALT SERPL-CCNC: 10 UNIT/L (ref 0–55)
AST SERPL-CCNC: 26 UNIT/L (ref 5–34)
BASOPHILS # BLD AUTO: 0.01 X10(3)/MCL
BASOPHILS NFR BLD AUTO: 0.5 %
BILIRUB SERPL-MCNC: 1.3 MG/DL
BUN SERPL-MCNC: 8.3 MG/DL (ref 7–18.7)
CALCIUM SERPL-MCNC: 7.4 MG/DL (ref 8.4–10.2)
CHLORIDE SERPL-SCNC: 111 MMOL/L (ref 98–107)
CO2 SERPL-SCNC: 25 MMOL/L (ref 22–29)
CREAT SERPL-MCNC: 0.56 MG/DL (ref 0.55–1.02)
EOSINOPHIL # BLD AUTO: 0.1 X10(3)/MCL (ref 0–0.9)
EOSINOPHIL NFR BLD AUTO: 4.9 %
ERYTHROCYTE [DISTWIDTH] IN BLOOD BY AUTOMATED COUNT: 18 % (ref 11.5–17)
GFR SERPLBLD CREATININE-BSD FMLA CKD-EPI: >60 MLS/MIN/1.73/M2
GLOBULIN SER-MCNC: 5 GM/DL (ref 2.4–3.5)
GLUCOSE SERPL-MCNC: 115 MG/DL (ref 74–100)
HCT VFR BLD AUTO: 26.5 % (ref 37–47)
HGB BLD-MCNC: 7.9 G/DL (ref 12–16)
IMM GRANULOCYTES # BLD AUTO: 0.01 X10(3)/MCL (ref 0–0.04)
IMM GRANULOCYTES NFR BLD AUTO: 0.5 %
LYMPHOCYTES # BLD AUTO: 0.35 X10(3)/MCL (ref 0.6–4.6)
LYMPHOCYTES NFR BLD AUTO: 17.2 %
MCH RBC QN AUTO: 28.5 PG (ref 27–31)
MCHC RBC AUTO-ENTMCNC: 29.8 G/DL (ref 33–36)
MCV RBC AUTO: 95.7 FL (ref 80–94)
MONOCYTES # BLD AUTO: 0.18 X10(3)/MCL (ref 0.1–1.3)
MONOCYTES NFR BLD AUTO: 8.8 %
NEUTROPHILS # BLD AUTO: 1.39 X10(3)/MCL (ref 2.1–9.2)
NEUTROPHILS NFR BLD AUTO: 68.1 %
NRBC BLD AUTO-RTO: 0 %
PLATELET # BLD AUTO: 77 X10(3)/MCL (ref 130–400)
PMV BLD AUTO: 8.9 FL (ref 7.4–10.4)
POTASSIUM SERPL-SCNC: 3.5 MMOL/L (ref 3.5–5.1)
PROT SERPL-MCNC: 6.7 GM/DL (ref 6.4–8.3)
RBC # BLD AUTO: 2.77 X10(6)/MCL (ref 4.2–5.4)
SODIUM SERPL-SCNC: 139 MMOL/L (ref 136–145)
WBC # SPEC AUTO: 2.04 X10(3)/MCL (ref 4.5–11.5)

## 2024-01-03 PROCEDURE — 80053 COMPREHEN METABOLIC PANEL: CPT | Performed by: INTERNAL MEDICINE

## 2024-01-03 PROCEDURE — 85025 COMPLETE CBC W/AUTO DIFF WBC: CPT | Performed by: INTERNAL MEDICINE

## 2024-02-01 NOTE — TELEPHONE ENCOUNTER
Patient was a no-show for scheduled virtual visit with Dr. Cabral on 1/22/24.  Attempt made to reach patient and Cristina (Nurse) to determine if visit with Dr. Cabral needed to be rescheduled.

## 2024-02-01 NOTE — TELEPHONE ENCOUNTER
I spoke with Cristina.  She asked that patient be scheduled again virtually for a visit with Dr. Cabral.  Appt scheduled 2/12/24; appt reminder notice faxed for Cristina's review.  Cristina states that the staff will assist patient with visit.

## 2024-02-12 ENCOUNTER — OFFICE VISIT (OUTPATIENT)
Dept: HEPATOLOGY | Facility: CLINIC | Age: 44
End: 2024-02-12
Payer: MEDICAID

## 2024-02-12 DIAGNOSIS — K74.60 CIRRHOSIS OF LIVER WITHOUT ASCITES, UNSPECIFIED HEPATIC CIRRHOSIS TYPE: Primary | ICD-10-CM

## 2024-02-12 PROCEDURE — 99215 OFFICE O/P EST HI 40 MIN: CPT | Mod: 95,,, | Performed by: INTERNAL MEDICINE

## 2024-02-12 NOTE — PATIENT INSTRUCTIONS
Recommendations:  -  Restart Epclusa x 24 weeks due to decompensated cirrhosis.    -  On Protonix 40 mg daily, so dosing would have to be adjusted 4 hrs away (preferably protonix given 4 hr after) from Epclusa.    Patient tells me the nursing home has their own pharmacy. Cristina, head nurse at [facility] needs to be contacted. I will do that. Patient gave me [phone] as the phone number of the nursing home.   -  HCV RNA quant to be measured 3 months after completing treatment.    -  CBC, CMP, PT INR every 6 months, start in Feb 2024.   -  U/s doppler of liver and TIPS function, do now (Feb 2024)  -  HCC surveillance with AFP and U/S abd every 6 months - start in Aug 2024.   -  Low salt in the diet, avoid canned, bottled and processed foods.  -  Continue current meds  -  Avoid alcohol, smoking, sedatives and meds with codeine.  -  Avoid high intake of Tylenol (more than 4 extra-strength pills in one day)  -  Call us if any bleeding, fevers, confusion, disorientation occur  -  [facility]: [facility]  -  Return in 3 months from now - can be video visit
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
## 2024-02-12 NOTE — Clinical Note
Recommendations: -  Restart Epclusa x 24 weeks due to decompensated cirrhosis.   -  On Protonix 40 mg daily, so dosing would have to be adjusted 4 hrs away (preferably protonix given 4 hr after) from Epclusa.   Patient tells me the nursing home has their own pharmacy. Cristina, head nurse at [facility] needs to be contacted. I will do that. Patient gave me [phone] as the phone number of the nursing home.  -  HCV RNA quant to be measured 3 months after completing treatment.   -  CBC, CMP, PT INR every 6 mon, start now.  -  U/s doppler of liver, TIPS function, Now.  -  HCC surveillance with AFP and U/S abd every 6 months - start in Aug 2024.  -  Continue current meds -  Avoid alcohol, smoking, sedatives and meds with codeine. -  Avoid high intake of Tylenol (more than 4 extra-strength pills in one day) -  Call us if any bleeding, fevers, confusion, disorientation occur -  Endoscopy: [facility] be done in [facility] -  Return in 3 months from now - can be video visit

## 2024-02-14 ENCOUNTER — LAB REQUISITION (OUTPATIENT)
Dept: LAB | Facility: HOSPITAL | Age: 44
End: 2024-02-14
Payer: MEDICAID

## 2024-02-14 ENCOUNTER — PATIENT MESSAGE (OUTPATIENT)
Dept: HEPATOLOGY | Facility: CLINIC | Age: 44
End: 2024-02-14
Payer: MEDICAID

## 2024-02-14 ENCOUNTER — TELEPHONE (OUTPATIENT)
Dept: HEPATOLOGY | Facility: CLINIC | Age: 44
End: 2024-02-14
Payer: MEDICAID

## 2024-02-14 DIAGNOSIS — K74.60 CIRRHOSIS OF LIVER WITHOUT ASCITES, UNSPECIFIED HEPATIC CIRRHOSIS TYPE: Primary | ICD-10-CM

## 2024-02-14 DIAGNOSIS — B19.20 DECOMPENSATED HCV CIRRHOSIS: ICD-10-CM

## 2024-02-14 DIAGNOSIS — K74.69 DECOMPENSATED HCV CIRRHOSIS: ICD-10-CM

## 2024-02-14 DIAGNOSIS — B18.2 HEP C W/O COMA, CHRONIC: ICD-10-CM

## 2024-02-14 DIAGNOSIS — N39.0 URINARY TRACT INFECTION, SITE NOT SPECIFIED: ICD-10-CM

## 2024-02-14 LAB
ALBUMIN SERPL-MCNC: 1.6 G/DL (ref 3.5–5)
ALBUMIN/GLOB SERPL: 0.3 RATIO (ref 1.1–2)
ALP SERPL-CCNC: 114 UNIT/L (ref 40–150)
ALT SERPL-CCNC: 12 UNIT/L (ref 0–55)
AST SERPL-CCNC: 29 UNIT/L (ref 5–34)
BASOPHILS # BLD AUTO: 0.01 X10(3)/MCL
BASOPHILS NFR BLD AUTO: 0.4 %
BILIRUB SERPL-MCNC: 1.2 MG/DL
BUN SERPL-MCNC: 13 MG/DL (ref 7–18.7)
CALCIUM SERPL-MCNC: 7.6 MG/DL (ref 8.4–10.2)
CHLORIDE SERPL-SCNC: 110 MMOL/L (ref 98–107)
CO2 SERPL-SCNC: 25 MMOL/L (ref 22–29)
CREAT SERPL-MCNC: 0.51 MG/DL (ref 0.55–1.02)
EOSINOPHIL # BLD AUTO: 0.14 X10(3)/MCL (ref 0–0.9)
EOSINOPHIL NFR BLD AUTO: 5.5 %
ERYTHROCYTE [DISTWIDTH] IN BLOOD BY AUTOMATED COUNT: 15.9 % (ref 11.5–17)
GFR SERPLBLD CREATININE-BSD FMLA CKD-EPI: >60 MLS/MIN/1.73/M2
GLOBULIN SER-MCNC: 4.8 GM/DL (ref 2.4–3.5)
GLUCOSE SERPL-MCNC: 90 MG/DL (ref 74–100)
HCT VFR BLD AUTO: 28.3 % (ref 37–47)
HGB BLD-MCNC: 8.9 G/DL (ref 12–16)
IMM GRANULOCYTES # BLD AUTO: 0.01 X10(3)/MCL (ref 0–0.04)
IMM GRANULOCYTES NFR BLD AUTO: 0.4 %
LYMPHOCYTES # BLD AUTO: 0.45 X10(3)/MCL (ref 0.6–4.6)
LYMPHOCYTES NFR BLD AUTO: 17.6 %
MCH RBC QN AUTO: 29.3 PG (ref 27–31)
MCHC RBC AUTO-ENTMCNC: 31.4 G/DL (ref 33–36)
MCV RBC AUTO: 93.1 FL (ref 80–94)
MONOCYTES # BLD AUTO: 0.22 X10(3)/MCL (ref 0.1–1.3)
MONOCYTES NFR BLD AUTO: 8.6 %
NEUTROPHILS # BLD AUTO: 1.73 X10(3)/MCL (ref 2.1–9.2)
NEUTROPHILS NFR BLD AUTO: 67.5 %
NRBC BLD AUTO-RTO: 0 %
PLATELET # BLD AUTO: 97 X10(3)/MCL (ref 130–400)
PMV BLD AUTO: 10.1 FL (ref 7.4–10.4)
POTASSIUM SERPL-SCNC: 3.6 MMOL/L (ref 3.5–5.1)
PROT SERPL-MCNC: 6.4 GM/DL (ref 6.4–8.3)
RBC # BLD AUTO: 3.04 X10(6)/MCL (ref 4.2–5.4)
SODIUM SERPL-SCNC: 137 MMOL/L (ref 136–145)
WBC # SPEC AUTO: 2.56 X10(3)/MCL (ref 4.5–11.5)

## 2024-02-14 PROCEDURE — 80053 COMPREHEN METABOLIC PANEL: CPT | Performed by: INTERNAL MEDICINE

## 2024-02-14 PROCEDURE — 85025 COMPLETE CBC W/AUTO DIFF WBC: CPT | Performed by: INTERNAL MEDICINE

## 2024-02-14 RX ORDER — VELPATASVIR AND SOFOSBUVIR 100; 400 MG/1; MG/1
1 TABLET, FILM COATED ORAL DAILY
Qty: 28 TABLET | Refills: 5 | OUTPATIENT
Start: 2024-02-14 | End: 2024-02-20 | Stop reason: SDUPTHER

## 2024-02-14 NOTE — TELEPHONE ENCOUNTER
Message sent to patient to schedule labs and US.  Patient scheduled for VV in 3 mths with Dr. Cabral by nurse Lopez

## 2024-02-14 NOTE — TELEPHONE ENCOUNTER
----- Message from Era Cabral MD sent at 2/12/2024  5:22 PM CST -----  Recommendations:  -  Restart Epclusa x 24 weeks due to decompensated cirrhosis.    -  On Protonix 40 mg daily, so dosing would have to be adjusted 4 hrs away (preferably protonix given 4 hr after) from Epclusa.    Patient tells me the nursing home has their own pharmacy. Cristina, head nurse at [facility] needs to be contacted. I will do that. Patient gave me [phone] as the phone number of the nursing home.   -  HCV RNA quant to be measured 3 months after completing treatment.    -  CBC, CMP, PT INR every 6 mon, start now.   -  U/s doppler of liver, TIPS function, Now.   -  HCC surveillance with AFP and U/S abd every 6 months - start in Aug 2024.   -  Continue current meds  -  Avoid alcohol, smoking, sedatives and meds with codeine.  -  Avoid high intake of Tylenol (more than 4 extra-strength pills in one day)  -  Call us if any bleeding, fevers, confusion, disorientation occur  -  Endoscopy: to be done in [facility]  -  Return in 3 months from now - can be video visit

## 2024-02-14 NOTE — TELEPHONE ENCOUNTER
----- Message from Era Cabral MD sent at 2/12/2024  5:22 PM CST -----  Recommendations:  -  Restart Epclusa x 24 weeks due to decompensated cirrhosis.    -  On Protonix 40 mg daily, so dosing would have to be adjusted 4 hrs away (preferably protonix given 4 hr after) from Epclusa.    Patient tells me the nursing home has their own pharmacy. Cristina, head nurse at [facility] needs to be contacted. I will do that. Patient gave me [phone] as the phone number of the nursing home.   -  HCV RNA quant to be measured 3 months after completing treatment.    -  CBC, CMP, PT INR every 6 mon, start now.   -  U/s doppler of liver, TIPS function, Now.   -  HCC surveillance with AFP and U/S abd every 6 months - start in Aug 2024.   -  Continue current meds  -  Avoid alcohol, smoking, sedatives and meds with codeine.  -  Avoid high intake of Tylenol (more than 4 extra-strength pills in one day)  -  Call us if any bleeding, fevers, confusion, disorientation occur  -  Endoscopy: to be done in [facility]  -  Return in 3 months from now - can be video visit

## 2024-02-19 ENCOUNTER — TELEPHONE (OUTPATIENT)
Dept: HEPATOLOGY | Facility: CLINIC | Age: 44
End: 2024-02-19
Payer: MEDICAID

## 2024-02-19 NOTE — TELEPHONE ENCOUNTER
----- Message from Susannah Zuñiga sent at 2/19/2024 10:00 AM CST -----  Regarding: Orders  Contact: [phone] f  [facility]  is calling to speak with someone in provider office [facility] please call pt at  [phone] fax [phone]

## 2024-02-19 NOTE — TELEPHONE ENCOUNTER
CBC, CMP, PT INR every 6 mon, start now.   -  U/s doppler of liver, TIPS function, Now.     Pt schd and mailed. DOMINIKMA

## 2024-02-19 NOTE — TELEPHONE ENCOUNTER
----- Message from Sera Pinedo MA sent at 2/14/2024  4:14 PM CST -----  Hi just an FYI I sent patient a message in the portal to call us to schedule labs,   ----- Message -----  From: Era Cabral MD  Sent: 2/12/2024   5:22 PM CST  To: [facility] Hepatitis C Staff; Misha Girard Staff    Recommendations:  -  Restart Epclusa x 24 weeks due to decompensated cirrhosis.    -  On Protonix 40 mg daily, so dosing would have to be adjusted 4 hrs away (preferably protonix given 4 hr after) from Epclusa.    Patient tells me the nursing home has their own pharmacy. Cristina, head nurse at [facility] needs to be contacted. I will do that. Patient gave me [phone] as the phone number of the nursing home.   -  HCV RNA quant to be measured 3 months after completing treatment.    -  CBC, CMP, PT INR every 6 mon, start now.   -  U/s doppler of liver, TIPS function, Now.   -  HCC surveillance with AFP and U/S abd every 6 months - start in Aug 2024.   -  Continue current meds  -  Avoid alcohol, smoking, sedatives and meds with codeine.  -  Avoid high intake of Tylenol (more than 4 extra-strength pills in one day)  -  Call us if any bleeding, fevers, confusion, disorientation occur  -  Endoscopy: [facility] in [facility]  -  Return in 3 months from now - can be video visit

## 2024-02-20 ENCOUNTER — TELEPHONE (OUTPATIENT)
Dept: HEPATOLOGY | Facility: CLINIC | Age: 44
End: 2024-02-20
Payer: MEDICAID

## 2024-02-20 DIAGNOSIS — B18.2 HEP C W/O COMA, CHRONIC: ICD-10-CM

## 2024-02-20 DIAGNOSIS — B19.20 DECOMPENSATED HCV CIRRHOSIS: ICD-10-CM

## 2024-02-20 DIAGNOSIS — K74.69 DECOMPENSATED HCV CIRRHOSIS: ICD-10-CM

## 2024-02-20 RX ORDER — VELPATASVIR AND SOFOSBUVIR 100; 400 MG/1; MG/1
1 TABLET, FILM COATED ORAL DAILY
Qty: 28 TABLET | Refills: 5 | Status: ACTIVE | OUTPATIENT
Start: 2024-02-20

## 2024-02-20 NOTE — TELEPHONE ENCOUNTER
Epclusa prescription sent on to [facility] on 2/14/24, and a second time today 2/20/24 to [facility].     Please let me know if they did not receive it. Thanks    LAVERNE Cabral

## 2024-02-21 ENCOUNTER — LAB REQUISITION (OUTPATIENT)
Dept: LAB | Facility: HOSPITAL | Age: 44
End: 2024-02-21
Payer: MEDICAID

## 2024-02-21 DIAGNOSIS — N18.9 CHRONIC KIDNEY DISEASE, UNSPECIFIED: ICD-10-CM

## 2024-02-21 DIAGNOSIS — K74.60 UNSPECIFIED CIRRHOSIS OF LIVER: ICD-10-CM

## 2024-02-21 DIAGNOSIS — D64.9 ANEMIA, UNSPECIFIED: ICD-10-CM

## 2024-02-21 LAB
AFP-TM SERPL-MCNC: <2 NG/ML
ALBUMIN SERPL-MCNC: 1.6 G/DL (ref 3.5–5)
ALBUMIN/GLOB SERPL: 0.4 RATIO (ref 1.1–2)
ALP SERPL-CCNC: 107 UNIT/L (ref 40–150)
ALT SERPL-CCNC: 11 UNIT/L (ref 0–55)
AST SERPL-CCNC: 28 UNIT/L (ref 5–34)
BASOPHILS # BLD AUTO: 0.02 X10(3)/MCL
BASOPHILS NFR BLD AUTO: 0.9 %
BILIRUB SERPL-MCNC: 1.1 MG/DL
BUN SERPL-MCNC: 9 MG/DL (ref 7–18.7)
CALCIUM SERPL-MCNC: 7.6 MG/DL (ref 8.4–10.2)
CHLORIDE SERPL-SCNC: 109 MMOL/L (ref 98–107)
CO2 SERPL-SCNC: 28 MMOL/L (ref 22–29)
CREAT SERPL-MCNC: 0.46 MG/DL (ref 0.55–1.02)
EOSINOPHIL # BLD AUTO: 0.15 X10(3)/MCL (ref 0–0.9)
EOSINOPHIL NFR BLD AUTO: 6.5 %
ERYTHROCYTE [DISTWIDTH] IN BLOOD BY AUTOMATED COUNT: 15 % (ref 11.5–17)
GFR SERPLBLD CREATININE-BSD FMLA CKD-EPI: >60 MLS/MIN/1.73/M2
GLOBULIN SER-MCNC: 4.3 GM/DL (ref 2.4–3.5)
GLUCOSE SERPL-MCNC: 85 MG/DL (ref 74–100)
HCT VFR BLD AUTO: 28.3 % (ref 37–47)
HGB BLD-MCNC: 9 G/DL (ref 12–16)
IMM GRANULOCYTES # BLD AUTO: 0.01 X10(3)/MCL (ref 0–0.04)
IMM GRANULOCYTES NFR BLD AUTO: 0.4 %
INR PPP: 1.5 (ref 2–3)
LYMPHOCYTES # BLD AUTO: 0.39 X10(3)/MCL (ref 0.6–4.6)
LYMPHOCYTES NFR BLD AUTO: 16.9 %
MCH RBC QN AUTO: 29.4 PG (ref 27–31)
MCHC RBC AUTO-ENTMCNC: 31.8 G/DL (ref 33–36)
MCV RBC AUTO: 92.5 FL (ref 80–94)
MONOCYTES # BLD AUTO: 0.19 X10(3)/MCL (ref 0.1–1.3)
MONOCYTES NFR BLD AUTO: 8.2 %
NEUTROPHILS # BLD AUTO: 1.55 X10(3)/MCL (ref 2.1–9.2)
NEUTROPHILS NFR BLD AUTO: 67.1 %
NRBC BLD AUTO-RTO: 0 %
PLATELET # BLD AUTO: 87 X10(3)/MCL (ref 130–400)
PMV BLD AUTO: 9.2 FL (ref 7.4–10.4)
POTASSIUM SERPL-SCNC: 4 MMOL/L (ref 3.5–5.1)
PROT SERPL-MCNC: 5.9 GM/DL (ref 6.4–8.3)
PROTHROMBIN TIME: 17.7 SECONDS (ref 11.7–14.5)
RBC # BLD AUTO: 3.06 X10(6)/MCL (ref 4.2–5.4)
SODIUM SERPL-SCNC: 138 MMOL/L (ref 136–145)
WBC # SPEC AUTO: 2.31 X10(3)/MCL (ref 4.5–11.5)

## 2024-02-21 PROCEDURE — 80053 COMPREHEN METABOLIC PANEL: CPT | Performed by: INTERNAL MEDICINE

## 2024-02-21 PROCEDURE — 82105 ALPHA-FETOPROTEIN SERUM: CPT | Performed by: INTERNAL MEDICINE

## 2024-02-21 PROCEDURE — 85025 COMPLETE CBC W/AUTO DIFF WBC: CPT | Performed by: INTERNAL MEDICINE

## 2024-02-21 PROCEDURE — 85610 PROTHROMBIN TIME: CPT | Performed by: INTERNAL MEDICINE

## 2024-02-23 DIAGNOSIS — B18.2 HEP C W/O COMA, CHRONIC: Primary | ICD-10-CM

## 2024-02-23 NOTE — TELEPHONE ENCOUNTER
Per [facility] patient will start Eclusa X 24 weeks on 2/27/24.  SVR 12 lab order faxed to [facility] dated 11/12/24.

## 2024-03-01 ENCOUNTER — TELEPHONE (OUTPATIENT)
Dept: HEPATOLOGY | Facility: CLINIC | Age: 44
End: 2024-03-01
Payer: MEDICAID

## 2024-03-01 NOTE — TELEPHONE ENCOUNTER
----- Message from Agnes Koroma MA sent at 3/1/2024  2:27 PM CST -----  Regarding: FW: call back    ----- Message -----  From: Gonsalo Gaines  Sent: 3/1/2024  12:56 PM CST  To: Misha Girard Staff  Subject: call back                                        Cristina call to speak with MS Lopez in regards to a start date for the pt RX requesting call back     Call

## 2024-03-08 ENCOUNTER — LAB REQUISITION (OUTPATIENT)
Dept: LAB | Facility: HOSPITAL | Age: 44
End: 2024-03-08
Payer: MEDICAID

## 2024-03-08 DIAGNOSIS — K76.9 LIVER DISEASE, UNSPECIFIED: ICD-10-CM

## 2024-03-08 LAB
AMMONIA PLAS-MSCNC: 146.1 UMOL/L (ref 18–72)
APPEARANCE UR: CLEAR
BACTERIA #/AREA URNS AUTO: ABNORMAL /HPF
BILIRUB UR QL STRIP.AUTO: ABNORMAL
COLOR UR AUTO: ABNORMAL
GLUCOSE UR QL STRIP.AUTO: NEGATIVE
KETONES UR QL STRIP.AUTO: NEGATIVE
LEUKOCYTE ESTERASE UR QL STRIP.AUTO: ABNORMAL
NITRITE UR QL STRIP.AUTO: POSITIVE
PH UR STRIP.AUTO: 8.5 [PH]
PROT UR QL STRIP.AUTO: 30
RBC #/AREA URNS AUTO: ABNORMAL /HPF
RBC UR QL AUTO: ABNORMAL
SP GR UR STRIP.AUTO: 1.01 (ref 1–1.03)
SQUAMOUS #/AREA URNS AUTO: ABNORMAL /HPF
TRI-PHOS CRY URNS QL MICRO: ABNORMAL /HPF
UROBILINOGEN UR STRIP-ACNC: 1
WBC #/AREA URNS AUTO: ABNORMAL /HPF

## 2024-03-08 PROCEDURE — 81003 URINALYSIS AUTO W/O SCOPE: CPT | Performed by: INTERNAL MEDICINE

## 2024-03-08 PROCEDURE — 82140 ASSAY OF AMMONIA: CPT | Performed by: INTERNAL MEDICINE

## 2024-03-08 PROCEDURE — 87086 URINE CULTURE/COLONY COUNT: CPT | Performed by: INTERNAL MEDICINE

## 2024-03-11 LAB
BACTERIA UR CULT: ABNORMAL
BACTERIA UR CULT: ABNORMAL

## 2024-03-21 PROCEDURE — 81001 URINALYSIS AUTO W/SCOPE: CPT | Performed by: INTERNAL MEDICINE

## 2024-03-21 PROCEDURE — 87086 URINE CULTURE/COLONY COUNT: CPT | Performed by: INTERNAL MEDICINE

## 2024-03-22 ENCOUNTER — LAB REQUISITION (OUTPATIENT)
Dept: LAB | Facility: HOSPITAL | Age: 44
End: 2024-03-22
Payer: MEDICAID

## 2024-03-22 DIAGNOSIS — N39.0 URINARY TRACT INFECTION, SITE NOT SPECIFIED: ICD-10-CM

## 2024-03-22 LAB
APPEARANCE UR: ABNORMAL
BACTERIA #/AREA URNS AUTO: ABNORMAL /HPF
BILIRUB UR QL STRIP.AUTO: NEGATIVE
CAOX CRY URNS QL MICRO: ABNORMAL /HPF
COLOR UR AUTO: ABNORMAL
GLUCOSE UR QL STRIP.AUTO: NEGATIVE
KETONES UR QL STRIP.AUTO: NEGATIVE
LEUKOCYTE ESTERASE UR QL STRIP.AUTO: ABNORMAL
NITRITE UR QL STRIP.AUTO: POSITIVE
PH UR STRIP.AUTO: 6 [PH]
PROT UR QL STRIP.AUTO: NEGATIVE
RBC #/AREA URNS AUTO: ABNORMAL /HPF
RBC UR QL AUTO: ABNORMAL
SP GR UR STRIP.AUTO: 1.02 (ref 1–1.03)
SQUAMOUS #/AREA URNS AUTO: ABNORMAL /HPF
UROBILINOGEN UR STRIP-ACNC: 1
WBC #/AREA URNS AUTO: ABNORMAL /HPF
YEAST URNS QL MICRO: ABNORMAL /HPF

## 2024-03-24 LAB — BACTERIA UR CULT: ABNORMAL

## 2024-04-11 ENCOUNTER — LAB REQUISITION (OUTPATIENT)
Dept: LAB | Facility: HOSPITAL | Age: 44
End: 2024-04-11
Payer: MEDICAID

## 2024-04-11 DIAGNOSIS — D64.9 ANEMIA, UNSPECIFIED: ICD-10-CM

## 2024-04-11 LAB
ALBUMIN SERPL-MCNC: 2.3 G/DL (ref 3.5–5)
ALBUMIN/GLOB SERPL: 0.5 RATIO (ref 1.1–2)
ALP SERPL-CCNC: 130 UNIT/L (ref 40–150)
ALT SERPL-CCNC: 13 UNIT/L (ref 0–55)
AST SERPL-CCNC: 30 UNIT/L (ref 5–34)
BASOPHILS # BLD AUTO: 0.03 X10(3)/MCL
BASOPHILS NFR BLD AUTO: 1.6 %
BILIRUB SERPL-MCNC: 1.3 MG/DL
BUN SERPL-MCNC: 9.9 MG/DL (ref 7–18.7)
CALCIUM SERPL-MCNC: 7.9 MG/DL (ref 8.4–10.2)
CHLORIDE SERPL-SCNC: 111 MMOL/L (ref 98–107)
CO2 SERPL-SCNC: 23 MMOL/L (ref 22–29)
CREAT SERPL-MCNC: 0.51 MG/DL (ref 0.55–1.02)
EOSINOPHIL # BLD AUTO: 0.12 X10(3)/MCL (ref 0–0.9)
EOSINOPHIL NFR BLD AUTO: 6.3 %
ERYTHROCYTE [DISTWIDTH] IN BLOOD BY AUTOMATED COUNT: 14.6 % (ref 11.5–17)
GFR SERPLBLD CREATININE-BSD FMLA CKD-EPI: >60 MLS/MIN/1.73/M2
GLOBULIN SER-MCNC: 4.8 GM/DL (ref 2.4–3.5)
GLUCOSE SERPL-MCNC: 70 MG/DL (ref 74–100)
HCT VFR BLD AUTO: 31.8 % (ref 37–47)
HGB BLD-MCNC: 10.5 G/DL (ref 12–16)
IMM GRANULOCYTES # BLD AUTO: 0 X10(3)/MCL (ref 0–0.04)
IMM GRANULOCYTES NFR BLD AUTO: 0 %
LYMPHOCYTES # BLD AUTO: 0.48 X10(3)/MCL (ref 0.6–4.6)
LYMPHOCYTES NFR BLD AUTO: 25.4 %
MCH RBC QN AUTO: 30.3 PG (ref 27–31)
MCHC RBC AUTO-ENTMCNC: 33 G/DL (ref 33–36)
MCV RBC AUTO: 91.6 FL (ref 80–94)
MONOCYTES # BLD AUTO: 0.18 X10(3)/MCL (ref 0.1–1.3)
MONOCYTES NFR BLD AUTO: 9.5 %
NEUTROPHILS # BLD AUTO: 1.08 X10(3)/MCL (ref 2.1–9.2)
NEUTROPHILS NFR BLD AUTO: 57.2 %
NRBC BLD AUTO-RTO: 0 %
PLATELET # BLD AUTO: 85 X10(3)/MCL (ref 130–400)
PMV BLD AUTO: 10.6 FL (ref 7.4–10.4)
POTASSIUM SERPL-SCNC: 4.2 MMOL/L (ref 3.5–5.1)
PROT SERPL-MCNC: 7.1 GM/DL (ref 6.4–8.3)
RBC # BLD AUTO: 3.47 X10(6)/MCL (ref 4.2–5.4)
SODIUM SERPL-SCNC: 139 MMOL/L (ref 136–145)
WBC # SPEC AUTO: 1.89 X10(3)/MCL (ref 4.5–11.5)

## 2024-04-11 PROCEDURE — 80053 COMPREHEN METABOLIC PANEL: CPT | Performed by: INTERNAL MEDICINE

## 2024-04-11 PROCEDURE — 85025 COMPLETE CBC W/AUTO DIFF WBC: CPT | Performed by: INTERNAL MEDICINE

## 2024-05-06 ENCOUNTER — LAB REQUISITION (OUTPATIENT)
Dept: LAB | Facility: HOSPITAL | Age: 44
End: 2024-05-06
Payer: MEDICAID

## 2024-05-06 DIAGNOSIS — N28.9 DISORDER OF KIDNEY AND URETER, UNSPECIFIED: ICD-10-CM

## 2024-05-06 LAB
ANION GAP SERPL CALC-SCNC: 5 MEQ/L
BUN SERPL-MCNC: 10.8 MG/DL (ref 7–18.7)
CALCIUM SERPL-MCNC: 7.6 MG/DL (ref 8.4–10.2)
CHLORIDE SERPL-SCNC: 109 MMOL/L (ref 98–107)
CO2 SERPL-SCNC: 23 MMOL/L (ref 22–29)
CREAT SERPL-MCNC: 0.62 MG/DL (ref 0.55–1.02)
CREAT/UREA NIT SERPL: 17
GFR SERPLBLD CREATININE-BSD FMLA CKD-EPI: >60 MLS/MIN/1.73/M2
GLUCOSE SERPL-MCNC: 103 MG/DL (ref 74–100)
POTASSIUM SERPL-SCNC: 4.3 MMOL/L (ref 3.5–5.1)
SODIUM SERPL-SCNC: 137 MMOL/L (ref 136–145)

## 2024-05-06 PROCEDURE — 80048 BASIC METABOLIC PNL TOTAL CA: CPT | Performed by: INTERNAL MEDICINE

## 2024-05-14 ENCOUNTER — OFFICE VISIT (OUTPATIENT)
Dept: HEPATOLOGY | Facility: CLINIC | Age: 44
End: 2024-05-14
Payer: MEDICAID

## 2024-05-14 DIAGNOSIS — B18.2 HEP C W/O COMA, CHRONIC: ICD-10-CM

## 2024-05-14 DIAGNOSIS — Z95.828 S/P TIPS (TRANSJUGULAR INTRAHEPATIC PORTOSYSTEMIC SHUNT): ICD-10-CM

## 2024-05-14 DIAGNOSIS — R18.8 CIRRHOSIS OF LIVER WITH ASCITES, UNSPECIFIED HEPATIC CIRRHOSIS TYPE: Primary | ICD-10-CM

## 2024-05-14 DIAGNOSIS — K74.60 CIRRHOSIS OF LIVER WITH ASCITES, UNSPECIFIED HEPATIC CIRRHOSIS TYPE: Primary | ICD-10-CM

## 2024-05-14 PROCEDURE — 99213 OFFICE O/P EST LOW 20 MIN: CPT | Mod: 95,,, | Performed by: INTERNAL MEDICINE

## 2024-05-14 NOTE — PATIENT INSTRUCTIONS
Recommendations:  -  Continue Epclusa x 24 weeks due to decompensated cirrhosis.    -  On Protonix 40 mg daily, so dosing would have to be adjusted 4 hrs away (preferably protonix given 4 hr after) from Epclusa.    -  Patient tells me the nursing home has their own pharmacy. Cristina, head nurse at [facility] needs to be contacted. I will do that. Patient gave me [phone] as the phone number of the nursing home.   -  HCV RNA quant to be measured 3 months after completing treatment.    -  Needs U/S abd with doppler to evaluate TIPS function, ascites, and HCC surveillance.     -  CBC, CMP, PT INR, AFP every 6 months, start in now.    -  Low salt in the diet, avoid canned, bottled and processed foods.  -  Continue current meds  -  Avoid alcohol, smoking, sedatives and meds with codeine.  -  Avoid high intake of Tylenol (more than 4 extra-strength pills in one day)  -  Call us if any bleeding, fevers, confusion, disorientation occur  -  Endoscopy: to be done in [facility]  -  Return in 3 months from now - can be video visit
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
## 2024-05-14 NOTE — Clinical Note
Recommendations: -  Continue Epclusa x 24 weeks due to decompensated cirrhosis.   -  On Protonix 40 mg daily, so dosing would have to be adjusted 4 hrs away (preferably protonix given 4 hr after) from Epclusa.   -  Patient tells me the nursing home has their own pharmacy. Cristina, head nurse at [facility] needs to be contacted. I will do that. Patient gave me [phone] as the phone number of the nursing home.  -  HCV RNA quant to be measured 3 months after completing treatment.   -  Needs U/S abd with doppler to evaluate TIPS function, ascites, and HCC surveillance.    -  CBC, CMP, PT INR, AFP every 6 months, start in now.   -  Low salt in the diet, avoid canned, bottled and processed foods. -  Continue current meds -  Avoid alcohol, smoking, sedatives and meds with codeine. -  Avoid high intake of Tylenol (more than 4 extra-strength pills in one day) -  Call us if bleeding, fevers, confusion, disorientation occur -  Endoscopy at [facility] [place] -  Return in 3 months - can be video visit

## 2024-05-15 ENCOUNTER — TELEPHONE (OUTPATIENT)
Dept: HEPATOLOGY | Facility: CLINIC | Age: 44
End: 2024-05-15
Payer: MEDICAID

## 2024-05-15 DIAGNOSIS — K74.60 CIRRHOSIS OF LIVER WITH ASCITES, UNSPECIFIED HEPATIC CIRRHOSIS TYPE: Primary | ICD-10-CM

## 2024-05-15 DIAGNOSIS — Z95.828 S/P TIPS (TRANSJUGULAR INTRAHEPATIC PORTOSYSTEMIC SHUNT): ICD-10-CM

## 2024-05-15 DIAGNOSIS — R18.8 CIRRHOSIS OF LIVER WITH ASCITES, UNSPECIFIED HEPATIC CIRRHOSIS TYPE: Primary | ICD-10-CM

## 2024-05-15 NOTE — TELEPHONE ENCOUNTER
----- Message from Era Cabral MD sent at 5/14/2024  2:42 PM CDT -----  Recommendations:  -  Continue Epclusa x 24 weeks due to decompensated cirrhosis.    -  On Protonix 40 mg daily, so dosing would have to be adjusted 4 hrs away (preferably protonix given 4 hr after) from Epclusa.    -  Patient tells me the nursing home has their own pharmacy. Cristina, head nurse at [facility] needs to be contacted. I will do that. Patient gave me [phone] as the phone number of the nursing home.   -  HCV RNA quant to be measured 3 months after completing treatment.    -  Needs U/S abd with doppler to evaluate TIPS function, ascites, and HCC surveillance.     -  CBC, CMP, PT INR, AFP every 6 months, start in now.    -  Low salt in the diet, avoid canned, bottled and processed foods.  -  Continue current meds  -  Avoid alcohol, smoking, sedatives and meds with codeine.  -  Avoid high intake of Tylenol (more than 4 extra-strength pills in one day)  -  Call us if bleeding, fevers, confusion, disorientation occur  -  Endoscopy at GI [facility]  -  Return in 3 months - can be video visit

## 2024-05-15 NOTE — TELEPHONE ENCOUNTER
Patient completed clinic visit with Dr. Cabral on 5/14/24.  Orders reviewed and discussed with Cristina ( at [facility]).  Orders for HCC labs and US Complete with Doppler faxed to to [facility] and [facility] dated 5/15/24.  It was stressed that testing needed to be done now to access tips function and ascites.   Orders already sent for patient to have HCC testing done 8/21/24 and to complete svr 12 draw on 11/12/24.   F/u visit with Dr. Cabral scheduled 8/26/24 to discuss August HCC results.

## 2024-05-16 ENCOUNTER — LAB REQUISITION (OUTPATIENT)
Dept: LAB | Facility: HOSPITAL | Age: 44
End: 2024-05-16
Payer: MEDICAID

## 2024-05-16 DIAGNOSIS — K74.60 UNSPECIFIED CIRRHOSIS OF LIVER: ICD-10-CM

## 2024-05-16 DIAGNOSIS — R18.8 OTHER ASCITES: ICD-10-CM

## 2024-05-16 LAB
AFP-TM SERPL-MCNC: 2 NG/ML
ALBUMIN SERPL-MCNC: 2.3 G/DL (ref 3.5–5)
ALBUMIN/GLOB SERPL: 0.5 RATIO (ref 1.1–2)
ALP SERPL-CCNC: 110 UNIT/L (ref 40–150)
ALT SERPL-CCNC: 8 UNIT/L (ref 0–55)
AST SERPL-CCNC: 22 UNIT/L (ref 5–34)
BASOPHILS # BLD AUTO: 0.02 X10(3)/MCL
BASOPHILS NFR BLD AUTO: 0.9 %
BILIRUB SERPL-MCNC: 1.5 MG/DL
BUN SERPL-MCNC: 7.8 MG/DL (ref 7–18.7)
CALCIUM SERPL-MCNC: 7.9 MG/DL (ref 8.4–10.2)
CHLORIDE SERPL-SCNC: 110 MMOL/L (ref 98–107)
CO2 SERPL-SCNC: 27 MMOL/L (ref 22–29)
CREAT SERPL-MCNC: 0.58 MG/DL (ref 0.55–1.02)
EOSINOPHIL # BLD AUTO: 0.1 X10(3)/MCL (ref 0–0.9)
EOSINOPHIL NFR BLD AUTO: 4.7 %
ERYTHROCYTE [DISTWIDTH] IN BLOOD BY AUTOMATED COUNT: 15.9 % (ref 11.5–17)
GFR SERPLBLD CREATININE-BSD FMLA CKD-EPI: >60 ML/MIN/1.73/M2
GLOBULIN SER-MCNC: 4.9 GM/DL (ref 2.4–3.5)
GLUCOSE SERPL-MCNC: 76 MG/DL (ref 74–100)
HCT VFR BLD AUTO: 33.3 % (ref 37–47)
HGB BLD-MCNC: 10.5 G/DL (ref 12–16)
IMM GRANULOCYTES # BLD AUTO: 0.01 X10(3)/MCL (ref 0–0.04)
IMM GRANULOCYTES NFR BLD AUTO: 0.5 %
INR PPP: 1.7 (ref 2–3)
LYMPHOCYTES # BLD AUTO: 0.51 X10(3)/MCL (ref 0.6–4.6)
LYMPHOCYTES NFR BLD AUTO: 23.9 %
MCH RBC QN AUTO: 30.1 PG (ref 27–31)
MCHC RBC AUTO-ENTMCNC: 31.5 G/DL (ref 33–36)
MCV RBC AUTO: 95.4 FL (ref 80–94)
MONOCYTES # BLD AUTO: 0.18 X10(3)/MCL (ref 0.1–1.3)
MONOCYTES NFR BLD AUTO: 8.5 %
NEUTROPHILS # BLD AUTO: 1.31 X10(3)/MCL (ref 2.1–9.2)
NEUTROPHILS NFR BLD AUTO: 61.5 %
NRBC BLD AUTO-RTO: 0 %
PLATELET # BLD AUTO: 101 X10(3)/MCL (ref 130–400)
PMV BLD AUTO: 9 FL (ref 7.4–10.4)
POTASSIUM SERPL-SCNC: 4.1 MMOL/L (ref 3.5–5.1)
PROT SERPL-MCNC: 7.2 GM/DL (ref 6.4–8.3)
PROTHROMBIN TIME: 19.9 SECONDS (ref 11.7–14.5)
RBC # BLD AUTO: 3.49 X10(6)/MCL (ref 4.2–5.4)
SODIUM SERPL-SCNC: 140 MMOL/L (ref 136–145)
WBC # SPEC AUTO: 2.13 X10(3)/MCL (ref 4.5–11.5)

## 2024-05-16 PROCEDURE — 85610 PROTHROMBIN TIME: CPT | Performed by: INTERNAL MEDICINE

## 2024-05-16 PROCEDURE — 82105 ALPHA-FETOPROTEIN SERUM: CPT | Performed by: INTERNAL MEDICINE

## 2024-05-16 PROCEDURE — 80053 COMPREHEN METABOLIC PANEL: CPT | Performed by: INTERNAL MEDICINE

## 2024-05-16 PROCEDURE — 85025 COMPLETE CBC W/AUTO DIFF WBC: CPT | Performed by: INTERNAL MEDICINE

## 2024-05-21 ENCOUNTER — LAB REQUISITION (OUTPATIENT)
Dept: LAB | Facility: HOSPITAL | Age: 44
End: 2024-05-21
Payer: MEDICAID

## 2024-05-21 DIAGNOSIS — R77.2 ABNORMALITY OF ALPHAFETOPROTEIN: ICD-10-CM

## 2024-05-21 DIAGNOSIS — N18.9 CHRONIC KIDNEY DISEASE, UNSPECIFIED: ICD-10-CM

## 2024-05-21 DIAGNOSIS — R79.1 ABNORMAL COAGULATION PROFILE: ICD-10-CM

## 2024-05-21 DIAGNOSIS — D64.9 ANEMIA, UNSPECIFIED: ICD-10-CM

## 2024-05-21 LAB
AFP-TM SERPL-MCNC: <2 NG/ML
ALBUMIN SERPL-MCNC: 2 G/DL (ref 3.5–5)
ALBUMIN/GLOB SERPL: 0.4 RATIO (ref 1.1–2)
ALP SERPL-CCNC: 107 UNIT/L (ref 40–150)
ALT SERPL-CCNC: 7 UNIT/L (ref 0–55)
ANION GAP SERPL CALC-SCNC: 4 MEQ/L
AST SERPL-CCNC: 19 UNIT/L (ref 5–34)
BASOPHILS # BLD AUTO: 0.01 X10(3)/MCL
BASOPHILS NFR BLD AUTO: 0.4 %
BILIRUB SERPL-MCNC: 1.5 MG/DL
BUN SERPL-MCNC: 8.4 MG/DL (ref 7–18.7)
CALCIUM SERPL-MCNC: 7.4 MG/DL (ref 8.4–10.2)
CHLORIDE SERPL-SCNC: 106 MMOL/L (ref 98–107)
CO2 SERPL-SCNC: 28 MMOL/L (ref 22–29)
CREAT SERPL-MCNC: 0.55 MG/DL (ref 0.55–1.02)
CREAT/UREA NIT SERPL: 15
EOSINOPHIL # BLD AUTO: 0.14 X10(3)/MCL (ref 0–0.9)
EOSINOPHIL NFR BLD AUTO: 5.8 %
ERYTHROCYTE [DISTWIDTH] IN BLOOD BY AUTOMATED COUNT: 16 % (ref 11.5–17)
GFR SERPLBLD CREATININE-BSD FMLA CKD-EPI: >60 ML/MIN/1.73/M2
GLOBULIN SER-MCNC: 4.7 GM/DL (ref 2.4–3.5)
GLUCOSE SERPL-MCNC: 106 MG/DL (ref 74–100)
HCT VFR BLD AUTO: 28.6 % (ref 37–47)
HGB BLD-MCNC: 9.4 G/DL (ref 12–16)
IMM GRANULOCYTES # BLD AUTO: 0.02 X10(3)/MCL (ref 0–0.04)
IMM GRANULOCYTES NFR BLD AUTO: 0.8 %
INR PPP: 1.7 (ref 2–3)
LYMPHOCYTES # BLD AUTO: 0.41 X10(3)/MCL (ref 0.6–4.6)
LYMPHOCYTES NFR BLD AUTO: 17 %
MCH RBC QN AUTO: 31 PG (ref 27–31)
MCHC RBC AUTO-ENTMCNC: 32.9 G/DL (ref 33–36)
MCV RBC AUTO: 94.4 FL (ref 80–94)
MONOCYTES # BLD AUTO: 0.2 X10(3)/MCL (ref 0.1–1.3)
MONOCYTES NFR BLD AUTO: 8.3 %
NEUTROPHILS # BLD AUTO: 1.63 X10(3)/MCL (ref 2.1–9.2)
NEUTROPHILS NFR BLD AUTO: 67.7 %
NRBC BLD AUTO-RTO: 0 %
PLATELET # BLD AUTO: 75 X10(3)/MCL (ref 130–400)
PMV BLD AUTO: 9.6 FL (ref 7.4–10.4)
POTASSIUM SERPL-SCNC: 3.6 MMOL/L (ref 3.5–5.1)
PROT SERPL-MCNC: 6.7 GM/DL (ref 6.4–8.3)
PROTHROMBIN TIME: 20.2 SECONDS (ref 11.7–14.5)
RBC # BLD AUTO: 3.03 X10(6)/MCL (ref 4.2–5.4)
SODIUM SERPL-SCNC: 138 MMOL/L (ref 136–145)
WBC # SPEC AUTO: 2.41 X10(3)/MCL (ref 4.5–11.5)

## 2024-05-21 PROCEDURE — 85025 COMPLETE CBC W/AUTO DIFF WBC: CPT | Performed by: INTERNAL MEDICINE

## 2024-05-21 PROCEDURE — 85610 PROTHROMBIN TIME: CPT | Performed by: INTERNAL MEDICINE

## 2024-05-21 PROCEDURE — 82105 ALPHA-FETOPROTEIN SERUM: CPT | Performed by: INTERNAL MEDICINE

## 2024-05-21 PROCEDURE — 80053 COMPREHEN METABOLIC PANEL: CPT | Performed by: INTERNAL MEDICINE

## 2024-05-25 ENCOUNTER — LAB REQUISITION (OUTPATIENT)
Dept: LAB | Facility: HOSPITAL | Age: 44
End: 2024-05-25
Payer: MEDICAID

## 2024-05-25 DIAGNOSIS — D64.9 ANEMIA, UNSPECIFIED: ICD-10-CM

## 2024-05-25 LAB
ALBUMIN SERPL-MCNC: 2 G/DL (ref 3.5–5)
ALBUMIN/GLOB SERPL: 0.4 RATIO (ref 1.1–2)
ALP SERPL-CCNC: 108 UNIT/L (ref 40–150)
ALT SERPL-CCNC: 7 UNIT/L (ref 0–55)
ANION GAP SERPL CALC-SCNC: 5 MEQ/L
AST SERPL-CCNC: 22 UNIT/L (ref 5–34)
BASOPHILS # BLD AUTO: 0.02 X10(3)/MCL
BASOPHILS NFR BLD AUTO: 0.3 %
BILIRUB SERPL-MCNC: 2 MG/DL
BUN SERPL-MCNC: 7.8 MG/DL (ref 7–18.7)
CALCIUM SERPL-MCNC: 7.6 MG/DL (ref 8.4–10.2)
CHLORIDE SERPL-SCNC: 109 MMOL/L (ref 98–107)
CO2 SERPL-SCNC: 26 MMOL/L (ref 22–29)
CREAT SERPL-MCNC: 0.61 MG/DL (ref 0.55–1.02)
CREAT/UREA NIT SERPL: 13
EOSINOPHIL # BLD AUTO: 0.11 X10(3)/MCL (ref 0–0.9)
EOSINOPHIL NFR BLD AUTO: 1.8 %
ERYTHROCYTE [DISTWIDTH] IN BLOOD BY AUTOMATED COUNT: 16.1 % (ref 11.5–17)
GFR SERPLBLD CREATININE-BSD FMLA CKD-EPI: >60 ML/MIN/1.73/M2
GLOBULIN SER-MCNC: 4.7 GM/DL (ref 2.4–3.5)
GLUCOSE SERPL-MCNC: 73 MG/DL (ref 74–100)
HCT VFR BLD AUTO: 31.3 % (ref 37–47)
HGB BLD-MCNC: 9.8 G/DL (ref 12–16)
IMM GRANULOCYTES # BLD AUTO: 0.02 X10(3)/MCL (ref 0–0.04)
IMM GRANULOCYTES NFR BLD AUTO: 0.3 %
LYMPHOCYTES # BLD AUTO: 0.52 X10(3)/MCL (ref 0.6–4.6)
LYMPHOCYTES NFR BLD AUTO: 8.4 %
MCH RBC QN AUTO: 29.9 PG (ref 27–31)
MCHC RBC AUTO-ENTMCNC: 31.3 G/DL (ref 33–36)
MCV RBC AUTO: 95.4 FL (ref 80–94)
MONOCYTES # BLD AUTO: 0.31 X10(3)/MCL (ref 0.1–1.3)
MONOCYTES NFR BLD AUTO: 5 %
NEUTROPHILS # BLD AUTO: 5.18 X10(3)/MCL (ref 2.1–9.2)
NEUTROPHILS NFR BLD AUTO: 84.2 %
NRBC BLD AUTO-RTO: 0 %
PLATELET # BLD AUTO: 80 X10(3)/MCL (ref 130–400)
PMV BLD AUTO: 9.2 FL (ref 7.4–10.4)
POTASSIUM SERPL-SCNC: 3.6 MMOL/L (ref 3.5–5.1)
PROT SERPL-MCNC: 6.7 GM/DL (ref 6.4–8.3)
RBC # BLD AUTO: 3.28 X10(6)/MCL (ref 4.2–5.4)
SODIUM SERPL-SCNC: 140 MMOL/L (ref 136–145)
WBC # SPEC AUTO: 6.16 X10(3)/MCL (ref 4.5–11.5)

## 2024-05-25 PROCEDURE — 80053 COMPREHEN METABOLIC PANEL: CPT | Performed by: INTERNAL MEDICINE

## 2024-05-25 PROCEDURE — 85025 COMPLETE CBC W/AUTO DIFF WBC: CPT | Performed by: INTERNAL MEDICINE

## 2024-06-17 NOTE — CARE UPDATE
ONCOLOGY   Date of Service:  6/17/2024    Diagnosis:   Stage II left breast invasive ductal s/p mastectomy 1995 s/p radiation followed by 5 years of tamoxifen therapy    Chief Complaint:  Patient presents to clinic today today for routine oncology follow up.   Her daughter Naomi Duque is undergoing treatment for gastric cancer.     History of Present Illness:  Patient is a 79-year-old female with a past medical history significant for osteopenia, ulcerative colitis, hyperlipidemia and previously diagnosed stage II left breast invasive ductal carcinoma.  Patient underwent left breast mastectomy in 1995 by Dr. Hank Linares.  She did not require chemotherapy however she completed radiation and 5 years of tamoxifen therapy.  Prior genetic testing was performed in 2009. Patient was negative for BRCA 1 or 2 mutation.  She was recently treated for H pylori infection.    Past Medical History:   Diagnosis Date    Arthritis     Carcinoma of breast treated with adjuvant hormone therapy  (CMD) tamoxifen    Fibrocystic breast disease     HLD (hyperlipidemia)     Leg pain     left leg pain     Malignant neoplasm of breast (female), unspecified site     Osteopenia     Personal history of radiation therapy     Ulcerative Colitis        Past Surgical History:   Procedure Laterality Date    Adenoidectomy w/ myringotomy and tubes      Breast lumpectomy      Right    Colectomy      w/ileostomy    Mastectomy  1995  Dr. Linares   s/p radiation s/p tamoxifen      Left breast cancer    Remove tonsils/adenoids,<11 y/o      T & A    Tonsillectomy and adenoidectomy         ALLERGIES:   Allergen Reactions    Codeine Nausea & Vomiting    Penicillin G Nausea & Vomiting     Social History  Patient is  and lives with her  in [place].  She lives in [place] during the winter months.  Patient quit smoking almost 50 years ago.  She denies alcohol abuse or recreational drug usage.       Current Outpatient Medications   Medication Sig  Remained sinus rhythm over night.  Refused to participate in repositioning during the night. Requesting pain medication for her back that she takes at home. Ibuprofen given and was effective.    Dispense Refill    rosuvastatin (CRESTOR) 20 MG tablet Take 1 tablet by mouth daily. 90 tablet 1    lansoprazole (PREVACID) 30 MG capsule Take 1 capsule by mouth in the morning and 1 capsule in the evening. 28 capsule 0    clarithromycin (BIAXIN) 500 MG tablet Take 1 tablet by mouth in the morning and 1 tablet in the evening. 28 tablet 0    amoxicillin (AMOXIL) 500 MG capsule Take 2 capsules by mouth in the morning and 2 capsules in the evening. 56 capsule 0    traZODone (DESYREL) 50 MG tablet Take 1 tablet by mouth nightly. 30 tablet 3    pantoprazole (PROTONIX) 40 MG tablet daily.      diclofenac (VOLTAREN) 1 % gel Apply 2 g topically 4 times daily as needed for Pain. 300 g 0    turmeric 500 MG capsule Take 500 mg by mouth daily.      cholecalciferol (VITAMIN D3) 1000 UNITS tablet Take 2,000 Units by mouth daily.      latanoprost (XALATAN) 0.005 % ophthalmic solution Place 1 drop into left eye nightly.      Multiple Vitamin (DAILY MULTIVITAMIN) OR TABS 1 TABLET DAILY 0 0     No current facility-administered medications for this visit.     Review of Systems:  A 15 point review systems was performed and entered into epic electronic medical record by medical assistant.  I reviewed and agree with entry.    Physical Examination:  NECK:  Supple without masses or thyromegaly.  No jugular venous distension.   HEMATOLOGIC/LYMPHATIC:  No petechiae or purpura.  No tender or palpable lymph nodes in the cervical, supraclavicular, axillary or inguinal area.   RESPIRATORY:  Lungs are clear to auscultation without rhonchi or wheezing.   CARDIOVASCULAR:  Regular rate and rhythm of heart without murmurs, gallops or rubs.  BREAST:  Left breast reconstruction reveals no suspicious findings.  Right breast reveals no mass or adenopathy.  Patient has no evidence for nipple discharge or axillary lymph node enlargement.  ABDOMEN:  Nontender, nondistended, no masses, ascites or hepatosplenomegaly.    BACK/SPINE:   Nontender to  palpation.  EXTREMITIES:  No edema.      Labs:  Lab Results   Component Value Date/Time    WBC 4.7 05/07/2024 07:39 AM    WBC 4.9 07/01/2019 09:30 AM    RBC 4.56 05/07/2024 07:39 AM    RBC 4.64 07/01/2019 09:30 AM    HGB 14.6 05/07/2024 07:39 AM    HGB 15.2 07/01/2019 09:30 AM    HCT 44.8 05/07/2024 07:39 AM    HCT 44.0 07/01/2019 09:30 AM    MCV 98.2 05/07/2024 07:39 AM    MCV 94.8 07/01/2019 09:30 AM     05/07/2024 07:39 AM     07/08/2019 09:14 AM     05/03/2012 09:15 AM    ANEUT 3.0 05/07/2024 07:39 AM    ANEUT 3.2 07/01/2019 09:30 AM    ANEUT 2.2 05/03/2012 09:15 AM    ALYMS 1.0 05/07/2024 07:39 AM    ALYMS 1.1 07/01/2019 09:30 AM    XENIA 0.5 05/07/2024 07:39 AM    XENIA 0.5 07/01/2019 09:30 AM    AEOS 0.1 05/07/2024 07:39 AM    AEOS 0.1 07/01/2019 09:30 AM    AEOS 0.1 05/03/2012 09:15 AM    ABASO 0.0 05/07/2024 07:39 AM    ABASO 0.0 07/01/2019 09:30 AM    ABASO 0.1 05/03/2012 09:15 AM     Lab Results   Component Value Date/Time    GLUCOSE 113 (H) 05/07/2024 07:39 AM    GLUCOSE 96 07/08/2019 09:14 AM    SODIUM 139 05/07/2024 07:39 AM    SODIUM 142 07/08/2019 09:14 AM    POTASSIUM 5.0 05/07/2024 07:39 AM    POTASSIUM 4.1 07/08/2019 09:14 AM    CHLORIDE 107 05/07/2024 07:39 AM    CHLORIDE 106 07/08/2019 09:14 AM    BUN 21 (H) 05/07/2024 07:39 AM    BUN 21 (H) 07/08/2019 09:14 AM    CREATININE 0.90 05/07/2024 07:39 AM    CREATININE 0.71 07/08/2019 09:14 AM    CALCIUM 9.2 05/07/2024 07:39 AM    CALCIUM 8.7 07/08/2019 09:14 AM    CALCIUM 8.7 05/03/2012 09:15 AM    ALBUMIN 4.0 05/07/2024 07:39 AM    ALBUMIN 3.5 (L) 07/08/2019 09:14 AM    AST 32 05/07/2024 07:39 AM    AST 25 07/08/2019 09:14 AM    ALKPT 66 05/07/2024 07:39 AM    ALKPT 50 07/08/2019 09:14 AM    GPT 36 05/07/2024 07:39 AM    GPT 33 07/08/2019 09:14 AM    ANIONGAP 12 05/07/2024 07:39 AM    ANIONGAP 14 07/08/2019 09:14 AM    BCRAT 30 (H) 07/08/2019 09:14 AM    GLOB 2.7 05/07/2024 07:39 AM    GLOB 3.3 07/08/2019 09:14 AM    AGR 1.5  05/07/2024 07:39 AM    AGR 1.1 07/08/2019 09:14 AM     Impression:  Stage II left breast invasive ductal s/p mastectomy 1995 s/p radiation followed by 5 years of tamoxifen therapy    Plan:  Right breast screening mammogram was normal earlier this month.  Oncology follow-up on a p.r.n. basis.   She is requesting guidance from Dr. Mcnally whether or not further H pylori testing is required after definitive treatment.  I spent a total of 20 minutes on the day of the visit.  This includes pre-charting, chart review, documenting, and referring/communicating with other health care professionals.        Ilene Esteban MD

## 2024-07-16 ENCOUNTER — LAB REQUISITION (OUTPATIENT)
Dept: LAB | Facility: HOSPITAL | Age: 44
End: 2024-07-16
Payer: MEDICAID

## 2024-07-16 DIAGNOSIS — R19.7 DIARRHEA, UNSPECIFIED: ICD-10-CM

## 2024-07-16 LAB
C DIFF TOX A+B STL QL IA: NEGATIVE
CLOSTRIDIUM DIFFICILE GDH ANTIGEN (OHS): NEGATIVE

## 2024-07-16 PROCEDURE — 86318 IA INFECTIOUS AGENT ANTIBODY: CPT | Performed by: INTERNAL MEDICINE

## 2024-07-26 ENCOUNTER — LAB REQUISITION (OUTPATIENT)
Dept: LAB | Facility: HOSPITAL | Age: 44
End: 2024-07-26
Payer: MEDICAID

## 2024-07-26 DIAGNOSIS — D64.9 ANEMIA, UNSPECIFIED: ICD-10-CM

## 2024-07-26 LAB
ALBUMIN SERPL-MCNC: 2.3 G/DL (ref 3.5–5)
ALBUMIN/GLOB SERPL: 0.5 RATIO (ref 1.1–2)
ALP SERPL-CCNC: 105 UNIT/L (ref 40–150)
ALT SERPL-CCNC: 12 UNIT/L (ref 0–55)
AMMONIA PLAS-MSCNC: 144.6 UMOL/L (ref 18–72)
ANION GAP SERPL CALC-SCNC: 5 MEQ/L
AST SERPL-CCNC: 43 UNIT/L (ref 5–34)
BASOPHILS # BLD AUTO: 0.02 X10(3)/MCL
BASOPHILS NFR BLD AUTO: 0.9 %
BILIRUB SERPL-MCNC: 1.6 MG/DL
BUN SERPL-MCNC: 10 MG/DL (ref 7–18.7)
CALCIUM SERPL-MCNC: 8.2 MG/DL (ref 8.4–10.2)
CHLORIDE SERPL-SCNC: 112 MMOL/L (ref 98–107)
CO2 SERPL-SCNC: 23 MMOL/L (ref 22–29)
CREAT SERPL-MCNC: 0.61 MG/DL (ref 0.55–1.02)
CREAT/UREA NIT SERPL: 16
EOSINOPHIL # BLD AUTO: 0.19 X10(3)/MCL (ref 0–0.9)
EOSINOPHIL NFR BLD AUTO: 8.7 %
ERYTHROCYTE [DISTWIDTH] IN BLOOD BY AUTOMATED COUNT: 14.6 % (ref 11.5–17)
GFR SERPLBLD CREATININE-BSD FMLA CKD-EPI: >60 ML/MIN/1.73/M2
GLOBULIN SER-MCNC: 4.9 GM/DL (ref 2.4–3.5)
GLUCOSE SERPL-MCNC: 111 MG/DL (ref 74–100)
HCT VFR BLD AUTO: 32.4 % (ref 37–47)
HGB BLD-MCNC: 10.6 G/DL (ref 12–16)
IMM GRANULOCYTES # BLD AUTO: 0.01 X10(3)/MCL (ref 0–0.04)
IMM GRANULOCYTES NFR BLD AUTO: 0.5 %
LYMPHOCYTES # BLD AUTO: 0.45 X10(3)/MCL (ref 0.6–4.6)
LYMPHOCYTES NFR BLD AUTO: 20.5 %
MCH RBC QN AUTO: 31 PG (ref 27–31)
MCHC RBC AUTO-ENTMCNC: 32.7 G/DL (ref 33–36)
MCV RBC AUTO: 94.7 FL (ref 80–94)
MONOCYTES # BLD AUTO: 0.21 X10(3)/MCL (ref 0.1–1.3)
MONOCYTES NFR BLD AUTO: 9.6 %
NEUTROPHILS # BLD AUTO: 1.31 X10(3)/MCL (ref 2.1–9.2)
NEUTROPHILS NFR BLD AUTO: 59.8 %
NRBC BLD AUTO-RTO: 0 %
PLATELET # BLD AUTO: 71 X10(3)/MCL (ref 130–400)
PMV BLD AUTO: 9.5 FL (ref 7.4–10.4)
POTASSIUM SERPL-SCNC: 4.2 MMOL/L (ref 3.5–5.1)
PROT SERPL-MCNC: 7.2 GM/DL (ref 6.4–8.3)
RBC # BLD AUTO: 3.42 X10(6)/MCL (ref 4.2–5.4)
SODIUM SERPL-SCNC: 140 MMOL/L (ref 136–145)
WBC # BLD AUTO: 2.19 X10(3)/MCL (ref 4.5–11.5)

## 2024-07-26 PROCEDURE — 85025 COMPLETE CBC W/AUTO DIFF WBC: CPT | Performed by: INTERNAL MEDICINE

## 2024-07-26 PROCEDURE — 82140 ASSAY OF AMMONIA: CPT | Performed by: INTERNAL MEDICINE

## 2024-07-26 PROCEDURE — 80053 COMPREHEN METABOLIC PANEL: CPT | Performed by: INTERNAL MEDICINE

## 2024-07-30 ENCOUNTER — LAB REQUISITION (OUTPATIENT)
Dept: LAB | Facility: HOSPITAL | Age: 44
End: 2024-07-30
Payer: MEDICAID

## 2024-07-30 DIAGNOSIS — B99.9 UNSPECIFIED INFECTIOUS DISEASE: ICD-10-CM

## 2024-07-30 PROCEDURE — 87040 BLOOD CULTURE FOR BACTERIA: CPT | Mod: 91 | Performed by: INTERNAL MEDICINE

## 2024-07-31 ENCOUNTER — LAB REQUISITION (OUTPATIENT)
Dept: LAB | Facility: HOSPITAL | Age: 44
End: 2024-07-31
Payer: MEDICAID

## 2024-07-31 DIAGNOSIS — Z16.22 RESISTANCE TO VANCOMYCIN RELATED ANTIBIOTICS: ICD-10-CM

## 2024-07-31 LAB — VANCOMYCIN TROUGH SERPL-MCNC: 20.5 UG/ML (ref 15–20)

## 2024-07-31 PROCEDURE — 80202 ASSAY OF VANCOMYCIN: CPT | Performed by: INTERNAL MEDICINE

## 2024-08-02 ENCOUNTER — LAB REQUISITION (OUTPATIENT)
Dept: LAB | Facility: HOSPITAL | Age: 44
End: 2024-08-02
Payer: MEDICAID

## 2024-08-02 DIAGNOSIS — D64.9 ANEMIA, UNSPECIFIED: ICD-10-CM

## 2024-08-02 DIAGNOSIS — N18.9 CHRONIC KIDNEY DISEASE, UNSPECIFIED: ICD-10-CM

## 2024-08-02 DIAGNOSIS — Z16.22 RESISTANCE TO VANCOMYCIN RELATED ANTIBIOTICS: ICD-10-CM

## 2024-08-02 LAB
ALBUMIN SERPL-MCNC: 2 G/DL (ref 3.5–5)
ALBUMIN/GLOB SERPL: 0.4 RATIO (ref 1.1–2)
ALP SERPL-CCNC: 100 UNIT/L (ref 40–150)
ALT SERPL-CCNC: 11 UNIT/L (ref 0–55)
ANION GAP SERPL CALC-SCNC: 4 MEQ/L
AST SERPL-CCNC: 27 UNIT/L (ref 5–34)
BASOPHILS # BLD AUTO: 0.02 X10(3)/MCL
BASOPHILS NFR BLD AUTO: 0.7 %
BILIRUB SERPL-MCNC: 1.1 MG/DL
BUN SERPL-MCNC: 5.9 MG/DL (ref 7–18.7)
CALCIUM SERPL-MCNC: 8 MG/DL (ref 8.4–10.2)
CHLORIDE SERPL-SCNC: 108 MMOL/L (ref 98–107)
CO2 SERPL-SCNC: 28 MMOL/L (ref 22–29)
CREAT SERPL-MCNC: 0.54 MG/DL (ref 0.55–1.02)
CREAT/UREA NIT SERPL: 11
EOSINOPHIL # BLD AUTO: 0.21 X10(3)/MCL (ref 0–0.9)
EOSINOPHIL NFR BLD AUTO: 7.6 %
ERYTHROCYTE [DISTWIDTH] IN BLOOD BY AUTOMATED COUNT: 14.2 % (ref 11.5–17)
GFR SERPLBLD CREATININE-BSD FMLA CKD-EPI: >60 ML/MIN/1.73/M2
GLOBULIN SER-MCNC: 4.7 GM/DL (ref 2.4–3.5)
GLUCOSE SERPL-MCNC: 83 MG/DL (ref 74–100)
HCT VFR BLD AUTO: 31.1 % (ref 37–47)
HGB BLD-MCNC: 10 G/DL (ref 12–16)
IMM GRANULOCYTES # BLD AUTO: 0.04 X10(3)/MCL (ref 0–0.04)
IMM GRANULOCYTES NFR BLD AUTO: 1.4 %
INR PPP: 1.4 (ref 2–3)
LYMPHOCYTES # BLD AUTO: 0.52 X10(3)/MCL (ref 0.6–4.6)
LYMPHOCYTES NFR BLD AUTO: 18.8 %
MCH RBC QN AUTO: 31.1 PG (ref 27–31)
MCHC RBC AUTO-ENTMCNC: 32.2 G/DL (ref 33–36)
MCV RBC AUTO: 96.6 FL (ref 80–94)
MONOCYTES # BLD AUTO: 0.24 X10(3)/MCL (ref 0.1–1.3)
MONOCYTES NFR BLD AUTO: 8.7 %
NEUTROPHILS # BLD AUTO: 1.73 X10(3)/MCL (ref 2.1–9.2)
NEUTROPHILS NFR BLD AUTO: 62.8 %
NRBC BLD AUTO-RTO: 0 %
PLATELET # BLD AUTO: 82 X10(3)/MCL (ref 130–400)
PMV BLD AUTO: 10.8 FL (ref 7.4–10.4)
POTASSIUM SERPL-SCNC: 4.1 MMOL/L (ref 3.5–5.1)
PROT SERPL-MCNC: 6.7 GM/DL (ref 6.4–8.3)
PROTHROMBIN TIME: 17.7 SECONDS (ref 11.7–14.5)
RBC # BLD AUTO: 3.22 X10(6)/MCL (ref 4.2–5.4)
SODIUM SERPL-SCNC: 140 MMOL/L (ref 136–145)
VANCOMYCIN TROUGH SERPL-MCNC: 18.6 UG/ML (ref 15–20)
WBC # BLD AUTO: 2.76 X10(3)/MCL (ref 4.5–11.5)

## 2024-08-02 PROCEDURE — 80202 ASSAY OF VANCOMYCIN: CPT | Performed by: INTERNAL MEDICINE

## 2024-08-02 PROCEDURE — 85025 COMPLETE CBC W/AUTO DIFF WBC: CPT | Performed by: INTERNAL MEDICINE

## 2024-08-02 PROCEDURE — 85610 PROTHROMBIN TIME: CPT | Performed by: INTERNAL MEDICINE

## 2024-08-02 PROCEDURE — 80053 COMPREHEN METABOLIC PANEL: CPT | Performed by: INTERNAL MEDICINE

## 2024-08-04 ENCOUNTER — LAB REQUISITION (OUTPATIENT)
Dept: LAB | Facility: HOSPITAL | Age: 44
End: 2024-08-04
Payer: MEDICAID

## 2024-08-04 DIAGNOSIS — Z16.22 RESISTANCE TO VANCOMYCIN RELATED ANTIBIOTICS: ICD-10-CM

## 2024-08-04 LAB
BACTERIA BLD CULT: NORMAL
BACTERIA BLD CULT: NORMAL
VANCOMYCIN TROUGH SERPL-MCNC: 14.1 UG/ML (ref 15–20)

## 2024-08-04 PROCEDURE — 80202 ASSAY OF VANCOMYCIN: CPT | Performed by: INTERNAL MEDICINE

## 2024-08-06 ENCOUNTER — LAB REQUISITION (OUTPATIENT)
Dept: LAB | Facility: HOSPITAL | Age: 44
End: 2024-08-06
Payer: MEDICAID

## 2024-08-06 DIAGNOSIS — Z16.22 RESISTANCE TO VANCOMYCIN RELATED ANTIBIOTICS: ICD-10-CM

## 2024-08-06 LAB — VANCOMYCIN TROUGH SERPL-MCNC: 16.4 UG/ML (ref 15–20)

## 2024-08-06 PROCEDURE — 80202 ASSAY OF VANCOMYCIN: CPT | Performed by: INTERNAL MEDICINE

## 2024-08-07 ENCOUNTER — TELEPHONE (OUTPATIENT)
Dept: HEPATOLOGY | Facility: CLINIC | Age: 44
End: 2024-08-07
Payer: MEDICAID

## 2024-08-08 ENCOUNTER — LAB REQUISITION (OUTPATIENT)
Dept: LAB | Facility: HOSPITAL | Age: 44
End: 2024-08-08
Payer: MEDICAID

## 2024-08-08 DIAGNOSIS — Z16.22 RESISTANCE TO VANCOMYCIN RELATED ANTIBIOTICS: ICD-10-CM

## 2024-08-08 LAB — VANCOMYCIN TROUGH SERPL-MCNC: 24.8 UG/ML (ref 15–20)

## 2024-08-08 PROCEDURE — 80202 ASSAY OF VANCOMYCIN: CPT | Performed by: INTERNAL MEDICINE

## 2024-08-27 ENCOUNTER — LAB REQUISITION (OUTPATIENT)
Dept: LAB | Facility: HOSPITAL | Age: 44
End: 2024-08-27
Payer: MEDICAID

## 2024-08-27 DIAGNOSIS — N18.9 CHRONIC KIDNEY DISEASE, UNSPECIFIED: ICD-10-CM

## 2024-08-27 DIAGNOSIS — D64.9 ANEMIA, UNSPECIFIED: ICD-10-CM

## 2024-08-27 LAB
ABS NEUT CALC (OHS): 1.19 X10(3)/MCL (ref 2.1–9.2)
ALBUMIN SERPL-MCNC: 2.1 G/DL (ref 3.5–5)
ALBUMIN/GLOB SERPL: 0.4 RATIO (ref 1.1–2)
ALP SERPL-CCNC: 106 UNIT/L (ref 40–150)
ALT SERPL-CCNC: 12 UNIT/L (ref 0–55)
ANION GAP SERPL CALC-SCNC: 6 MEQ/L
AST SERPL-CCNC: 39 UNIT/L (ref 5–34)
BILIRUB SERPL-MCNC: 1.4 MG/DL
BUN SERPL-MCNC: 9 MG/DL (ref 7–18.7)
CALCIUM SERPL-MCNC: 8.1 MG/DL (ref 8.4–10.2)
CHLORIDE SERPL-SCNC: 108 MMOL/L (ref 98–107)
CO2 SERPL-SCNC: 28 MMOL/L (ref 22–29)
CREAT SERPL-MCNC: 0.62 MG/DL (ref 0.55–1.02)
CREAT/UREA NIT SERPL: 15
EOSINOPHIL NFR BLD MANUAL: 0.13 X10(3)/MCL (ref 0–0.9)
EOSINOPHIL NFR BLD MANUAL: 7 % (ref 0–8)
ERYTHROCYTE [DISTWIDTH] IN BLOOD BY AUTOMATED COUNT: 14.6 % (ref 11.5–17)
GFR SERPLBLD CREATININE-BSD FMLA CKD-EPI: >60 ML/MIN/1.73/M2
GLOBULIN SER-MCNC: 4.7 GM/DL (ref 2.4–3.5)
GLUCOSE SERPL-MCNC: 82 MG/DL (ref 74–100)
HCT VFR BLD AUTO: 30.8 % (ref 37–47)
HGB BLD-MCNC: 9.7 G/DL (ref 12–16)
HYPOCHROMIA BLD QL SMEAR: ABNORMAL
LYMPHOCYTES NFR BLD MANUAL: 0.4 X10(3)/MCL
LYMPHOCYTES NFR BLD MANUAL: 22 % (ref 13–40)
MCH RBC QN AUTO: 29.9 PG (ref 27–31)
MCHC RBC AUTO-ENTMCNC: 31.5 G/DL (ref 33–36)
MCV RBC AUTO: 95.1 FL (ref 80–94)
MONOCYTES NFR BLD MANUAL: 0.11 X10(3)/MCL (ref 0.1–1.3)
MONOCYTES NFR BLD MANUAL: 6 % (ref 2–11)
NEUTROPHILS NFR BLD MANUAL: 65 % (ref 47–80)
NRBC BLD AUTO-RTO: 0 %
PLATELET # BLD AUTO: 76 X10(3)/MCL (ref 130–400)
PLATELET # BLD EST: ABNORMAL 10*3/UL
PMV BLD AUTO: 9.9 FL (ref 7.4–10.4)
POTASSIUM SERPL-SCNC: 4.4 MMOL/L (ref 3.5–5.1)
PROT SERPL-MCNC: 6.8 GM/DL (ref 6.4–8.3)
RBC # BLD AUTO: 3.24 X10(6)/MCL (ref 4.2–5.4)
RBC MORPH BLD: ABNORMAL
SODIUM SERPL-SCNC: 142 MMOL/L (ref 136–145)
WBC # BLD AUTO: 1.83 X10(3)/MCL (ref 4.5–11.5)

## 2024-08-27 PROCEDURE — 85027 COMPLETE CBC AUTOMATED: CPT | Performed by: INTERNAL MEDICINE

## 2024-08-27 PROCEDURE — 80053 COMPREHEN METABOLIC PANEL: CPT | Performed by: INTERNAL MEDICINE

## 2024-08-28 ENCOUNTER — TELEPHONE (OUTPATIENT)
Dept: HEPATOLOGY | Facility: CLINIC | Age: 44
End: 2024-08-28
Payer: MEDICAID

## 2024-08-28 NOTE — TELEPHONE ENCOUNTER
Provider from Nursing home is noticing patient is in need of antibiotic almost every month.  She wanted to consult with Dr Cabral about side effects of sofosbuvir-velpatasvir 400-100 mg Tab.    Message to Dr Cabral via secure chat.

## 2024-08-28 NOTE — TELEPHONE ENCOUNTER
----- Message from Agnes Koroma MA sent at 8/28/2024 11:16 AM CDT -----  Regarding: FW: Patient ravi  Contact: Esther  [phone]    ----- Message -----  From: Yumiko Rainey  Sent: 8/28/2024  11:08 AM CDT  To: [facility] Staff  [facility]                                           Name of Caller: Esther with Nursing Home      Contact Preference: [phone]     Nature of Call:  Requesting a call back would like to discuss possible side effects of  sofosbuvir-velpatasvir 400-100 mg Tab

## 2024-09-04 ENCOUNTER — TELEPHONE (OUTPATIENT)
Dept: HEPATOLOGY | Facility: CLINIC | Age: 44
End: 2024-09-04
Payer: MEDICAID

## 2024-09-04 NOTE — TELEPHONE ENCOUNTER
----- Message from Kaimlle Hagen RN sent at 8/30/2024  1:56 PM CDT -----  pledorene let nursing home know well epclusa has no effect on immune system, so it's not Epclusa doing it.

## 2024-09-09 ENCOUNTER — LAB REQUISITION (OUTPATIENT)
Dept: LAB | Facility: HOSPITAL | Age: 44
End: 2024-09-09
Payer: MEDICAID

## 2024-09-09 DIAGNOSIS — Z16.22 RESISTANCE TO VANCOMYCIN RELATED ANTIBIOTICS: ICD-10-CM

## 2024-09-09 LAB — VANCOMYCIN TROUGH SERPL-MCNC: 8.1 UG/ML (ref 15–20)

## 2024-09-09 PROCEDURE — 80202 ASSAY OF VANCOMYCIN: CPT | Performed by: INTERNAL MEDICINE

## 2024-09-12 ENCOUNTER — LAB REQUISITION (OUTPATIENT)
Dept: LAB | Facility: HOSPITAL | Age: 44
End: 2024-09-12
Payer: MEDICAID

## 2024-09-12 DIAGNOSIS — Z16.22 RESISTANCE TO VANCOMYCIN RELATED ANTIBIOTICS: ICD-10-CM

## 2024-09-12 LAB — VANCOMYCIN TROUGH SERPL-MCNC: 11.6 UG/ML (ref 15–20)

## 2024-09-12 PROCEDURE — 80202 ASSAY OF VANCOMYCIN: CPT | Performed by: INTERNAL MEDICINE

## 2024-09-13 ENCOUNTER — LAB REQUISITION (OUTPATIENT)
Dept: LAB | Facility: HOSPITAL | Age: 44
End: 2024-09-13
Payer: MEDICAID

## 2024-09-13 DIAGNOSIS — Z16.22 RESISTANCE TO VANCOMYCIN RELATED ANTIBIOTICS: ICD-10-CM

## 2024-09-13 LAB — VANCOMYCIN TROUGH SERPL-MCNC: 13.6 UG/ML (ref 15–20)

## 2024-09-13 PROCEDURE — 80202 ASSAY OF VANCOMYCIN: CPT | Performed by: INTERNAL MEDICINE

## 2024-09-16 ENCOUNTER — TELEPHONE (OUTPATIENT)
Dept: HEPATOLOGY | Facility: CLINIC | Age: 44
End: 2024-09-16
Payer: MEDICAID

## 2024-09-16 NOTE — TELEPHONE ENCOUNTER
Patient did not have US and AFP done in August.  I spoke with Esther.  Patient currently hospitalized.  She states that once patient returns to facility testing will be done.  Patient missed doses of Epclusa.  We need EOT date so that svr 12 lab draw can be adjusted.  Also patient was a no-show for clinic appt with Dr. Cabral on 8/26/24 virtually.  Unable to reschedule a virtual visit with Dr. Cabral because patient's [facility] account has been deactivated.

## 2024-09-16 NOTE — TELEPHONE ENCOUNTER
Esther called back and left a VM stating that hep c treatment was completed on 8/12/24; svr 12 date does not need adjusting.

## 2024-09-20 ENCOUNTER — LAB REQUISITION (OUTPATIENT)
Dept: LAB | Facility: HOSPITAL | Age: 44
End: 2024-09-20
Payer: MEDICAID

## 2024-09-20 DIAGNOSIS — R77.2 ABNORMALITY OF ALPHAFETOPROTEIN: ICD-10-CM

## 2024-09-20 DIAGNOSIS — R82.79 OTHER ABNORMAL FINDINGS ON MICROBIOLOGICAL EXAMINATION OF URINE: ICD-10-CM

## 2024-09-20 LAB
BILIRUB UR QL STRIP.AUTO: NEGATIVE
CLARITY UR: CLEAR
COLOR UR AUTO: YELLOW
GLUCOSE UR QL STRIP: NEGATIVE
HGB UR QL STRIP: NEGATIVE
KETONES UR QL STRIP: NEGATIVE
LEUKOCYTE ESTERASE UR QL STRIP: NEGATIVE
NITRITE UR QL STRIP: NEGATIVE
PH UR STRIP: 7.5 [PH]
PROT UR QL STRIP: NEGATIVE
SP GR UR STRIP.AUTO: 1.02 (ref 1–1.03)
UROBILINOGEN UR STRIP-ACNC: 0.2

## 2024-09-20 PROCEDURE — 87086 URINE CULTURE/COLONY COUNT: CPT | Performed by: INTERNAL MEDICINE

## 2024-09-20 PROCEDURE — 81003 URINALYSIS AUTO W/O SCOPE: CPT | Performed by: INTERNAL MEDICINE

## 2024-09-20 PROCEDURE — 82107 ALPHA-FETOPROTEIN L3: CPT | Performed by: INTERNAL MEDICINE

## 2024-09-22 LAB — BACTERIA UR CULT: NO GROWTH

## 2024-09-23 LAB
AFP L3 MFR SERPL: <0.5 %
AFP SERPL-MCNC: 2.5 NG/ML

## 2024-10-01 ENCOUNTER — TELEPHONE (OUTPATIENT)
Dept: HEPATOLOGY | Facility: CLINIC | Age: 44
End: 2024-10-01
Payer: MEDICAID

## 2024-10-01 NOTE — TELEPHONE ENCOUNTER
Patient was a no-show for scheduled virtual visit with Dr. Cabral today.  I spoke with staff at nursing home.  Appt with provider scheduled again on 11/1/24; reminder notice mailed.

## 2024-10-02 ENCOUNTER — TELEPHONE (OUTPATIENT)
Dept: HEPATOLOGY | Facility: CLINIC | Age: 44
End: 2024-10-02
Payer: MEDICAID

## 2024-10-02 DIAGNOSIS — B18.2 HEP C W/O COMA, CHRONIC: Primary | ICD-10-CM

## 2024-10-02 RX ORDER — VELPATASVIR AND SOFOSBUVIR 100; 400 MG/1; MG/1
1 TABLET, FILM COATED ORAL DAILY
Qty: 28 TABLET | Refills: 0 | Status: ACTIVE | OUTPATIENT
Start: 2024-10-02

## 2024-10-02 NOTE — TELEPHONE ENCOUNTER
Epclusa x 4 weeks, script sent to [facility]. She had taken 20 weeks previously. This will complete 24 weeks.     Please get CBC, CMP, Hep C RNA quant ASAP.      LAVERNE Cabral   Patient. Dr Lyons, Lyons with bipolar disorder.

## 2024-10-04 ENCOUNTER — LAB REQUISITION (OUTPATIENT)
Dept: LAB | Facility: HOSPITAL | Age: 44
End: 2024-10-04
Payer: MEDICAID

## 2024-10-04 DIAGNOSIS — E61.2 MAGNESIUM DEFICIENCY: ICD-10-CM

## 2024-10-04 LAB — MAGNESIUM SERPL-MCNC: 1.7 MG/DL (ref 1.6–2.6)

## 2024-10-04 PROCEDURE — 83735 ASSAY OF MAGNESIUM: CPT | Performed by: INTERNAL MEDICINE

## 2024-10-04 NOTE — TELEPHONE ENCOUNTER
I spoke with Nurse Laurel at [facility].  She states that patient has 32 tablets of Epclusa left and has not received the new shipment of Epclusa from [facility].  Patient takes Epclusa at night.  I stressed that patient should take the remaining 32 tabs once nightly and when she gets last shipment of Epclusa from [facility],  patient should take the additional 28 tablets and then therapy would be complete.  With no missed doses therapy will end on 12/2/24.  I will confirm completion date with staff at that time and send out a new order for patient to have a SVR 12 draw done  ~ 3/3/25.      Dr. Cabral ordered that patient have CBC, CMP and HCV RNA done ASAP.  Patient had labs done for Dr. Brower today.  See results for review in Epic.  Laurel states that patient will have HCV RNA drawn on 10/7/24.

## 2024-10-07 ENCOUNTER — LAB REQUISITION (OUTPATIENT)
Dept: LAB | Facility: HOSPITAL | Age: 44
End: 2024-10-07
Payer: MEDICAID

## 2024-10-07 DIAGNOSIS — B18.2 CHRONIC VIRAL HEPATITIS C: ICD-10-CM

## 2024-10-07 DIAGNOSIS — R76.8 OTHER SPECIFIED ABNORMAL IMMUNOLOGICAL FINDINGS IN SERUM: ICD-10-CM

## 2024-10-07 PROCEDURE — 87522 HEPATITIS C REVRS TRNSCRPJ: CPT | Performed by: INTERNAL MEDICINE

## 2024-10-08 LAB — HCV RNA SERPL NAA+PROBE-LOG IU: NOT DETECTED {LOG_IU}/ML

## 2024-10-09 ENCOUNTER — LAB REQUISITION (OUTPATIENT)
Dept: LAB | Facility: HOSPITAL | Age: 44
End: 2024-10-09
Payer: MEDICAID

## 2024-10-09 DIAGNOSIS — D64.9 ANEMIA, UNSPECIFIED: ICD-10-CM

## 2024-10-09 LAB
ALBUMIN SERPL-MCNC: 2.2 G/DL (ref 3.5–5)
ALBUMIN/GLOB SERPL: 0.5 RATIO (ref 1.1–2)
ALP SERPL-CCNC: 102 UNIT/L (ref 40–150)
ALT SERPL-CCNC: 9 UNIT/L (ref 0–55)
ANION GAP SERPL CALC-SCNC: 5 MEQ/L
AST SERPL-CCNC: 23 UNIT/L (ref 5–34)
BASOPHILS # BLD AUTO: 0.02 X10(3)/MCL
BASOPHILS NFR BLD AUTO: 0.9 %
BILIRUB SERPL-MCNC: 1.9 MG/DL
BUN SERPL-MCNC: 10 MG/DL (ref 7–18.7)
CALCIUM SERPL-MCNC: 8.1 MG/DL (ref 8.4–10.2)
CHLORIDE SERPL-SCNC: 110 MMOL/L (ref 98–107)
CO2 SERPL-SCNC: 26 MMOL/L (ref 22–29)
CREAT SERPL-MCNC: 0.64 MG/DL (ref 0.55–1.02)
CREAT/UREA NIT SERPL: 16
EOSINOPHIL # BLD AUTO: 0.24 X10(3)/MCL (ref 0–0.9)
EOSINOPHIL NFR BLD AUTO: 11.1 %
ERYTHROCYTE [DISTWIDTH] IN BLOOD BY AUTOMATED COUNT: 15.3 % (ref 11.5–17)
GFR SERPLBLD CREATININE-BSD FMLA CKD-EPI: >60 ML/MIN/1.73/M2
GLOBULIN SER-MCNC: 4.8 GM/DL (ref 2.4–3.5)
GLUCOSE SERPL-MCNC: 69 MG/DL (ref 74–100)
HCT VFR BLD AUTO: 33.6 % (ref 37–47)
HGB BLD-MCNC: 10.8 G/DL (ref 12–16)
IMM GRANULOCYTES # BLD AUTO: 0.01 X10(3)/MCL (ref 0–0.04)
IMM GRANULOCYTES NFR BLD AUTO: 0.5 %
LYMPHOCYTES # BLD AUTO: 0.54 X10(3)/MCL (ref 0.6–4.6)
LYMPHOCYTES NFR BLD AUTO: 24.9 %
MCH RBC QN AUTO: 30.3 PG (ref 27–31)
MCHC RBC AUTO-ENTMCNC: 32.1 G/DL (ref 33–36)
MCV RBC AUTO: 94.4 FL (ref 80–94)
MONOCYTES # BLD AUTO: 0.15 X10(3)/MCL (ref 0.1–1.3)
MONOCYTES NFR BLD AUTO: 6.9 %
NEUTROPHILS # BLD AUTO: 1.21 X10(3)/MCL (ref 2.1–9.2)
NEUTROPHILS NFR BLD AUTO: 55.7 %
NRBC BLD AUTO-RTO: 0 %
PLATELET # BLD AUTO: 92 X10(3)/MCL (ref 130–400)
PMV BLD AUTO: 9.6 FL (ref 7.4–10.4)
POTASSIUM SERPL-SCNC: 3.7 MMOL/L (ref 3.5–5.1)
PROT SERPL-MCNC: 7 GM/DL (ref 6.4–8.3)
RBC # BLD AUTO: 3.56 X10(6)/MCL (ref 4.2–5.4)
SODIUM SERPL-SCNC: 141 MMOL/L (ref 136–145)
WBC # BLD AUTO: 2.17 X10(3)/MCL (ref 4.5–11.5)

## 2024-10-09 PROCEDURE — 80053 COMPREHEN METABOLIC PANEL: CPT | Performed by: INTERNAL MEDICINE

## 2024-10-09 PROCEDURE — 85025 COMPLETE CBC W/AUTO DIFF WBC: CPT | Performed by: INTERNAL MEDICINE

## 2024-10-14 ENCOUNTER — OFFICE VISIT (OUTPATIENT)
Dept: HEPATOLOGY | Facility: CLINIC | Age: 44
End: 2024-10-14
Payer: MEDICAID

## 2024-10-14 DIAGNOSIS — K74.60 CHRONIC HEPATITIS C WITH CIRRHOSIS: Chronic | ICD-10-CM

## 2024-10-14 DIAGNOSIS — K74.60 CIRRHOSIS OF LIVER WITHOUT ASCITES, UNSPECIFIED HEPATIC CIRRHOSIS TYPE: Primary | ICD-10-CM

## 2024-10-14 DIAGNOSIS — B18.2 CHRONIC HEPATITIS C WITH CIRRHOSIS: Chronic | ICD-10-CM

## 2024-10-14 PROCEDURE — 99214 OFFICE O/P EST MOD 30 MIN: CPT | Mod: 95,,, | Performed by: INTERNAL MEDICINE
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
## 2024-10-14 NOTE — PATIENT INSTRUCTIONS
Recommendations:  -  Continue Epclusa x 24 weeks due to decompensated cirrhosis.    -  On Protonix 40 mg daily, so dosing would have to be adjusted 4 hrs away (preferably protonix given 4 hr after) from Epclusa.    -  HCV RNA quant to be measured at the end of treatment plus 3 months after completing treatment.     -  Needs U/S abd with doppler to evaluate TIPS function, ascites, and HCC surveillance.     -  CBC, CMP, PT INR, AFP every 6 months, next in November 2024.    -  Low salt in the diet, avoid canned, bottled and processed foods.  -  Continue current meds  -  Avoid alcohol, smoking, sedatives and meds with codeine.  -  Avoid high intake of Tylenol (more than 4 extra-strength pills in one day)  -  Call us if any bleeding, fevers, confusion, disorientation occur  -  Endoscopy: to be done in [facility]  -  Return in 4 months from now - can be video visit

## 2024-10-16 ENCOUNTER — TELEPHONE (OUTPATIENT)
Dept: HEPATOLOGY | Facility: CLINIC | Age: 44
End: 2024-10-16
Payer: MEDICAID

## 2024-10-16 NOTE — TELEPHONE ENCOUNTER
----- Message from Era Cabral MD sent at 10/14/2024  9:06 AM CDT -----  Recommendations:  -  Continue Epclusa x 24 weeks due to decompensated cirrhosis.    -  On Protonix 40 mg daily, so dosing would have to be adjusted 4 hrs away (preferably protonix given 4 hr after) from Epclusa.    -  HCV RNA quant to be measured at the end of treatment plus 3 months after completing treatment.     -  Needs U/S abd with doppler to evaluate TIPS function, ascites, and HCC surveillance.     -  CBC, CMP, PT INR, AFP every 6 months, next in November 2024.    -  Low salt in the diet, avoid canned, bottled and processed foods.  -  Continue current meds  -  Avoid alcohol, smoking, sedatives and meds with codeine.  -  Avoid high intake of Tylenol (more than 4 extra-strength pills in one day)  -  Call us if any bleeding, fevers, confusion, disorientation occur  -  Endoscopy: to be done in [facility]  -  Return in 4 months from now - can be video visit

## 2024-10-16 NOTE — TELEPHONE ENCOUNTER
Needs U/S abd with doppler to evaluate TIPS function, ascites, and HCC surveillance.   US schd and mailed.  -  CBC, CMP, PT INR, AFP every 6 months, next in November 2024.   labs schd and mailed. Unable to leave  (full).  4mthrecall placed. PERRY LEHMAN

## 2024-10-17 ENCOUNTER — LAB REQUISITION (OUTPATIENT)
Dept: LAB | Facility: HOSPITAL | Age: 44
End: 2024-10-17
Payer: MEDICAID

## 2024-10-17 DIAGNOSIS — Z16.22 RESISTANCE TO VANCOMYCIN RELATED ANTIBIOTICS: ICD-10-CM

## 2024-10-17 LAB — VANCOMYCIN TROUGH SERPL-MCNC: 12.1 UG/ML (ref 15–20)

## 2024-10-17 PROCEDURE — 80202 ASSAY OF VANCOMYCIN: CPT | Performed by: INTERNAL MEDICINE

## 2024-10-22 ENCOUNTER — TELEPHONE (OUTPATIENT)
Dept: HEPATOLOGY | Facility: CLINIC | Age: 44
End: 2024-10-22
Payer: MEDICAID

## 2024-10-22 NOTE — TELEPHONE ENCOUNTER
----- Message from Keyla sent at 10/22/2024 10:07 AM CDT -----  Regarding: Consult/Advisory  Contact: Fax # [phone]  Consult/Advisory     Name Of Caller:[facility]         Contact Preference: Fax # [phone] Attention Laurel Gonzalez LPN Phone [phone]     Nature of call: would like the orders for US and lab orders, and a clinical notes to be faxed over for their records

## 2024-11-11 ENCOUNTER — TELEPHONE (OUTPATIENT)
Dept: NEUROSURGERY | Facility: CLINIC | Age: 44
End: 2024-11-11
Payer: MEDICAID

## 2024-11-11 NOTE — TELEPHONE ENCOUNTER
Called patient to schedule an upcoming appointment with Guille Templeton MD and provided next appointment date and time.  Future Appointments   Date Time Provider Department Center   11/15/2024  7:45 AM [address]   11/15/2024  9:50 AM LAB, [facility]   1/6/2025  2:30 PM Guille Templeton MD [facility] NEUG [place]        Patient voiced understanding and thanked me.

## 2024-11-14 ENCOUNTER — TELEPHONE (OUTPATIENT)
Dept: HEPATOLOGY | Facility: CLINIC | Age: 44
End: 2024-11-14
Payer: MEDICAID

## 2024-11-14 NOTE — TELEPHONE ENCOUNTER
Received call from US Dept at [facility].  The Patient is scheduled for an US Abd w/Doppler. We do not do Dopplers at our facility. The Patient has labs also. She is traveling by [facility].    Call placed to [facility] [phone]. Temp Radiology Tech uncertain if they do US w/Doppler. Call back on Mon. [facility] was called bc the patient has an appt at the Location on 1/6/25. Would have tied in all of the appts.    Call placed to [facility] [address] and asked for Laurel. Call sent to Laurel with Information with my desk number to let her know Labs and US was cx'd for 11/15/24. Laurel on floor and cannot come to phone at this time.  Was informed that the Patient is presently in the Hospital and would not be able to keep the appt anyway.  Message would be passed on to Laurel.

## 2024-11-14 NOTE — TELEPHONE ENCOUNTER
Laurel with [facility] at [phone] returned the call.  She stated the Patient is an In Patient at [facility] for a flare up of her Cellulitis.  Laurel informed US Doppler could not be done at [facility] and Labs cx'd also.  Laurel stated she will speak to the Schedulers to cancel [facility].    Will look into rescheduling at another date when coming to [place] for other appts.  Voiced understanding.

## 2024-11-20 ENCOUNTER — TELEPHONE (OUTPATIENT)
Dept: HEPATOLOGY | Facility: CLINIC | Age: 44
End: 2024-11-20
Payer: MEDICAID

## 2024-11-20 NOTE — TELEPHONE ENCOUNTER
Patient needed to get US Abd w/Doppler and Labs rescheduled. The Patient will be coming to back to [place] on 1/6/25 for another appt. US and Labs scheduled on that day at [facility].  Spoke with Radiology Phong Gallagher and was informed [facility] can do US Liver w/Doppler as long as it is not a transplant pt.    All appts for [facility] mailed out to the Nursing Home.

## 2024-11-21 ENCOUNTER — LAB REQUISITION (OUTPATIENT)
Dept: LAB | Facility: HOSPITAL | Age: 44
End: 2024-11-21
Payer: MEDICAID

## 2024-11-21 DIAGNOSIS — N18.1 CHRONIC KIDNEY DISEASE, STAGE 1: ICD-10-CM

## 2024-11-21 LAB
AFP-TM SERPL-MCNC: 3.1 NG/ML
ALBUMIN SERPL-MCNC: 2.1 G/DL (ref 3.5–5)
ALBUMIN/GLOB SERPL: 0.5 RATIO (ref 1.1–2)
ALP SERPL-CCNC: 94 UNIT/L (ref 40–150)
ALT SERPL-CCNC: 11 UNIT/L (ref 0–55)
ANION GAP SERPL CALC-SCNC: 5 MEQ/L
AST SERPL-CCNC: 27 UNIT/L (ref 5–34)
BASOPHILS # BLD AUTO: 0.02 X10(3)/MCL
BASOPHILS NFR BLD AUTO: 1 %
BILIRUB SERPL-MCNC: 1.4 MG/DL
BUN SERPL-MCNC: 11.9 MG/DL (ref 7–18.7)
CALCIUM SERPL-MCNC: 8 MG/DL (ref 8.4–10.2)
CHLORIDE SERPL-SCNC: 110 MMOL/L (ref 98–107)
CO2 SERPL-SCNC: 27 MMOL/L (ref 22–29)
CREAT SERPL-MCNC: 0.53 MG/DL (ref 0.55–1.02)
CREAT/UREA NIT SERPL: 22
EOSINOPHIL # BLD AUTO: 0.22 X10(3)/MCL (ref 0–0.9)
EOSINOPHIL NFR BLD AUTO: 10.7 %
ERYTHROCYTE [DISTWIDTH] IN BLOOD BY AUTOMATED COUNT: 14.6 % (ref 11.5–17)
GFR SERPLBLD CREATININE-BSD FMLA CKD-EPI: >60 ML/MIN/1.73/M2
GLOBULIN SER-MCNC: 4.6 GM/DL (ref 2.4–3.5)
GLUCOSE SERPL-MCNC: 90 MG/DL (ref 74–100)
HCT VFR BLD AUTO: 31.8 % (ref 37–47)
HGB BLD-MCNC: 10.3 G/DL (ref 12–16)
IMM GRANULOCYTES # BLD AUTO: 0 X10(3)/MCL (ref 0–0.04)
IMM GRANULOCYTES NFR BLD AUTO: 0 %
INR PPP: 1.6 (ref 2–3)
LYMPHOCYTES # BLD AUTO: 0.52 X10(3)/MCL (ref 0.6–4.6)
LYMPHOCYTES NFR BLD AUTO: 25.2 %
MCH RBC QN AUTO: 30.5 PG (ref 27–31)
MCHC RBC AUTO-ENTMCNC: 32.4 G/DL (ref 33–36)
MCV RBC AUTO: 94.1 FL (ref 80–94)
MONOCYTES # BLD AUTO: 0.14 X10(3)/MCL (ref 0.1–1.3)
MONOCYTES NFR BLD AUTO: 6.8 %
NEUTROPHILS # BLD AUTO: 1.16 X10(3)/MCL (ref 2.1–9.2)
NEUTROPHILS NFR BLD AUTO: 56.3 %
NRBC BLD AUTO-RTO: 0 %
PLATELET # BLD AUTO: 72 X10(3)/MCL (ref 130–400)
PMV BLD AUTO: 10.6 FL (ref 7.4–10.4)
POTASSIUM SERPL-SCNC: 4.1 MMOL/L (ref 3.5–5.1)
PROT SERPL-MCNC: 6.7 GM/DL (ref 6.4–8.3)
PROTHROMBIN TIME: 19.7 SECONDS (ref 11.7–14.5)
RBC # BLD AUTO: 3.38 X10(6)/MCL (ref 4.2–5.4)
SODIUM SERPL-SCNC: 142 MMOL/L (ref 136–145)
WBC # BLD AUTO: 2.06 X10(3)/MCL (ref 4.5–11.5)

## 2024-11-21 PROCEDURE — 82105 ALPHA-FETOPROTEIN SERUM: CPT | Performed by: INTERNAL MEDICINE

## 2024-11-21 PROCEDURE — 85610 PROTHROMBIN TIME: CPT | Performed by: INTERNAL MEDICINE

## 2024-11-21 PROCEDURE — 85025 COMPLETE CBC W/AUTO DIFF WBC: CPT | Performed by: INTERNAL MEDICINE

## 2024-11-21 PROCEDURE — 80053 COMPREHEN METABOLIC PANEL: CPT | Performed by: INTERNAL MEDICINE

## 2024-11-25 ENCOUNTER — TELEPHONE (OUTPATIENT)
Dept: INFECTIOUS DISEASES | Facility: CLINIC | Age: 44
End: 2024-11-25
Payer: MEDICAID

## 2024-11-25 NOTE — TELEPHONE ENCOUNTER
----- Message from Cherry sent at 11/25/2024 11:00 AM CST -----  Regarding: Appt access  Contact: Shane  [phone]  Shane/[facility] calling to schedule appt for patient with severe cellulitis and leg infection. Pls call

## 2024-11-26 ENCOUNTER — LAB REQUISITION (OUTPATIENT)
Dept: LAB | Facility: HOSPITAL | Age: 44
End: 2024-11-26
Payer: MEDICAID

## 2024-11-26 DIAGNOSIS — N18.1 CHRONIC KIDNEY DISEASE, STAGE 1: ICD-10-CM

## 2024-11-26 LAB
ABS NEUT CALC (OHS): 1.56 X10(3)/MCL (ref 2.1–9.2)
ALBUMIN SERPL-MCNC: 2.3 G/DL (ref 3.5–5)
ALBUMIN/GLOB SERPL: 0.5 RATIO (ref 1.1–2)
ALP SERPL-CCNC: 87 UNIT/L (ref 40–150)
ALT SERPL-CCNC: 12 UNIT/L (ref 0–55)
ANION GAP SERPL CALC-SCNC: 5 MEQ/L
AST SERPL-CCNC: 30 UNIT/L (ref 5–34)
BILIRUB SERPL-MCNC: 2.1 MG/DL
BUN SERPL-MCNC: 12.3 MG/DL (ref 7–18.7)
CALCIUM SERPL-MCNC: 8 MG/DL (ref 8.4–10.2)
CHLORIDE SERPL-SCNC: 109 MMOL/L (ref 98–107)
CO2 SERPL-SCNC: 25 MMOL/L (ref 22–29)
CREAT SERPL-MCNC: 0.52 MG/DL (ref 0.55–1.02)
CREAT/UREA NIT SERPL: 24
EOSINOPHIL NFR BLD MANUAL: 0.08 X10(3)/MCL (ref 0–0.9)
EOSINOPHIL NFR BLD MANUAL: 4 % (ref 0–8)
ERYTHROCYTE [DISTWIDTH] IN BLOOD BY AUTOMATED COUNT: 13.7 % (ref 11.5–17)
GFR SERPLBLD CREATININE-BSD FMLA CKD-EPI: >60 ML/MIN/1.73/M2
GLOBULIN SER-MCNC: 4.5 GM/DL (ref 2.4–3.5)
GLUCOSE SERPL-MCNC: 83 MG/DL (ref 74–100)
HCT VFR BLD AUTO: 28.9 % (ref 37–47)
HGB BLD-MCNC: 9.5 G/DL (ref 12–16)
HYPOCHROMIA BLD QL SMEAR: ABNORMAL
LYMPHOCYTES NFR BLD MANUAL: 0.28 X10(3)/MCL
LYMPHOCYTES NFR BLD MANUAL: 14 % (ref 13–40)
MCH RBC QN AUTO: 30.5 PG (ref 27–31)
MCHC RBC AUTO-ENTMCNC: 32.9 G/DL (ref 33–36)
MCV RBC AUTO: 92.9 FL (ref 80–94)
MONOCYTES NFR BLD MANUAL: 0.1 X10(3)/MCL (ref 0.1–1.3)
MONOCYTES NFR BLD MANUAL: 5 % (ref 2–11)
NEUTROPHILS NFR BLD MANUAL: 77 % (ref 47–80)
NRBC BLD AUTO-RTO: 0 %
PLATELET # BLD AUTO: 47 X10(3)/MCL (ref 130–400)
PLATELET # BLD EST: ABNORMAL 10*3/UL
PMV BLD AUTO: 9.6 FL (ref 7.4–10.4)
POTASSIUM SERPL-SCNC: 4.1 MMOL/L (ref 3.5–5.1)
PROT SERPL-MCNC: 6.8 GM/DL (ref 6.4–8.3)
RBC # BLD AUTO: 3.11 X10(6)/MCL (ref 4.2–5.4)
RBC MORPH BLD: ABNORMAL
SODIUM SERPL-SCNC: 139 MMOL/L (ref 136–145)
WBC # BLD AUTO: 2.03 X10(3)/MCL (ref 4.5–11.5)

## 2024-11-26 PROCEDURE — 80053 COMPREHEN METABOLIC PANEL: CPT | Performed by: INTERNAL MEDICINE

## 2024-11-26 PROCEDURE — 85025 COMPLETE CBC W/AUTO DIFF WBC: CPT | Performed by: INTERNAL MEDICINE

## 2024-12-03 ENCOUNTER — TELEPHONE (OUTPATIENT)
Dept: HEPATOLOGY | Facility: CLINIC | Age: 44
End: 2024-12-03
Payer: MEDICAID

## 2024-12-03 NOTE — TELEPHONE ENCOUNTER
I spoke with Nurse Laurel at the nursing home.  Patient has had repeated hospital stays because of cellulitis and has missed multiple doses of Epclusa.  Per Laurel patient has 17 tablets left.  Hep c treatment should have been complete on 12/2/24.  I will call nursing home back on 12/20/24 to determine an EOT date so that svr 12 labs can be scheduled.

## 2024-12-18 ENCOUNTER — TELEPHONE (OUTPATIENT)
Dept: INFECTIOUS DISEASES | Facility: CLINIC | Age: 44
End: 2024-12-18
Payer: MEDICAID

## 2024-12-18 NOTE — TELEPHONE ENCOUNTER
Spoke to Henry and rescheduled pt appt with Deja hudson available 01/13.     ----- Message from Brian sent at 12/18/2024 10:45 AM CST -----  Name of Caller:Henry     Nature of Call: Requesting a F/u appt rescheduling     Best Call Back Number: [phone]     Additional Information Henry w/ [facility] calling regarding Appointment Access for trying to get the pt rescheduled for the cancelled appt that was on 12/11/24. Please call back to assist

## 2024-12-24 ENCOUNTER — TELEPHONE (OUTPATIENT)
Dept: HEPATOLOGY | Facility: CLINIC | Age: 44
End: 2024-12-24
Payer: MEDICAID

## 2024-12-24 NOTE — TELEPHONE ENCOUNTER
I spoke with Nurse Rachel and told her that no additional refills needed for Epclusa at this time.  She states that patient took last tab on 12/20/24.  She missed some does because of hospitalizations so that's why treatment ended later than expected.  I will fax an order for patient to have svr 12 draw done on 3/20/25.

## 2024-12-24 NOTE — TELEPHONE ENCOUNTER
----- Message from RAUL Pond sent at 12/23/2024  4:22 PM CST -----  Regarding: FW: rx refill  Contact: Rhonda nurse    ----- Message -----  From: Chanel Steve MA  Sent: 12/23/2024   4:14 PM CST  To: Dejah Villegas RN  Subject: FW: rx refill                                      ----- Message -----  From: Milagro Patel  Sent: 12/23/2024   3:47 PM CST  To: Misha Girard Staff  Subject: rx refill                                        RX Name:sofosbuvir-velpatasvir 400-100 mg Tab     How is it taken:     Quantity:       Preferred Pharmacy with phone number:      [facility]  [address] [address]  Phone: [phone] Fax: [phone]                Ordering Provider:Misha       Contact Preference:[phone] (home) [phone] (work)      Addl info:patient is out

## 2025-01-02 ENCOUNTER — LAB REQUISITION (OUTPATIENT)
Dept: LAB | Facility: HOSPITAL | Age: 45
End: 2025-01-02
Payer: MEDICAID

## 2025-01-02 DIAGNOSIS — N18.1 CHRONIC KIDNEY DISEASE, STAGE 1: ICD-10-CM

## 2025-01-02 LAB
ALBUMIN SERPL-MCNC: 2.3 G/DL (ref 3.5–5)
ALBUMIN/GLOB SERPL: 0.5 RATIO (ref 1.1–2)
ALP SERPL-CCNC: 106 UNIT/L (ref 40–150)
ALT SERPL-CCNC: 10 UNIT/L (ref 0–55)
ANION GAP SERPL CALC-SCNC: 6 MEQ/L
AST SERPL-CCNC: 24 UNIT/L (ref 5–34)
BASOPHILS # BLD AUTO: 0.02 X10(3)/MCL
BASOPHILS NFR BLD AUTO: 0.7 %
BILIRUB SERPL-MCNC: 1.9 MG/DL
BUN SERPL-MCNC: 6.8 MG/DL (ref 7–18.7)
CALCIUM SERPL-MCNC: 7.9 MG/DL (ref 8.4–10.2)
CHLORIDE SERPL-SCNC: 107 MMOL/L (ref 98–107)
CO2 SERPL-SCNC: 25 MMOL/L (ref 22–29)
CREAT SERPL-MCNC: 0.63 MG/DL (ref 0.55–1.02)
CREAT/UREA NIT SERPL: 11
EOSINOPHIL # BLD AUTO: 0.19 X10(3)/MCL (ref 0–0.9)
EOSINOPHIL NFR BLD AUTO: 6.8 %
ERYTHROCYTE [DISTWIDTH] IN BLOOD BY AUTOMATED COUNT: 17.3 % (ref 11.5–17)
FERRITIN SERPL-MCNC: 204.91 NG/ML (ref 4.63–204)
FOLATE SERPL-MCNC: 15.6 NG/ML (ref 7–31.4)
GFR SERPLBLD CREATININE-BSD FMLA CKD-EPI: >60 ML/MIN/1.73/M2
GLOBULIN SER-MCNC: 5 GM/DL (ref 2.4–3.5)
GLUCOSE SERPL-MCNC: 94 MG/DL (ref 74–100)
HCT VFR BLD AUTO: 31.9 % (ref 37–47)
HGB BLD-MCNC: 9.7 G/DL (ref 12–16)
IMM GRANULOCYTES # BLD AUTO: 0.01 X10(3)/MCL (ref 0–0.04)
IMM GRANULOCYTES NFR BLD AUTO: 0.4 %
IRON SATN MFR SERPL: 64 % (ref 20–50)
IRON SERPL-MCNC: 97 UG/DL (ref 50–170)
LYMPHOCYTES # BLD AUTO: 0.64 X10(3)/MCL (ref 0.6–4.6)
LYMPHOCYTES NFR BLD AUTO: 23 %
MCH RBC QN AUTO: 29.9 PG (ref 27–31)
MCHC RBC AUTO-ENTMCNC: 30.4 G/DL (ref 33–36)
MCV RBC AUTO: 98.5 FL (ref 80–94)
MONOCYTES # BLD AUTO: 0.16 X10(3)/MCL (ref 0.1–1.3)
MONOCYTES NFR BLD AUTO: 5.8 %
NEUTROPHILS # BLD AUTO: 1.76 X10(3)/MCL (ref 2.1–9.2)
NEUTROPHILS NFR BLD AUTO: 63.3 %
NRBC BLD AUTO-RTO: 0 %
PLATELET # BLD AUTO: 124 X10(3)/MCL (ref 130–400)
PMV BLD AUTO: 10.3 FL (ref 7.4–10.4)
POTASSIUM SERPL-SCNC: 4 MMOL/L (ref 3.5–5.1)
PROT SERPL-MCNC: 7.3 GM/DL (ref 6.4–8.3)
RBC # BLD AUTO: 3.24 X10(6)/MCL (ref 4.2–5.4)
SODIUM SERPL-SCNC: 138 MMOL/L (ref 136–145)
TIBC SERPL-MCNC: 151 UG/DL (ref 250–450)
TIBC SERPL-MCNC: 54 UG/DL (ref 70–310)
TRANSFERRIN SERPL-MCNC: 120 MG/DL (ref 180–382)
VIT B12 SERPL-MCNC: 1289 PG/ML (ref 213–816)
WBC # BLD AUTO: 2.78 X10(3)/MCL (ref 4.5–11.5)

## 2025-01-02 PROCEDURE — 82728 ASSAY OF FERRITIN: CPT | Performed by: INTERNAL MEDICINE

## 2025-01-02 PROCEDURE — 85025 COMPLETE CBC W/AUTO DIFF WBC: CPT | Performed by: INTERNAL MEDICINE

## 2025-01-02 PROCEDURE — 80053 COMPREHEN METABOLIC PANEL: CPT | Performed by: INTERNAL MEDICINE

## 2025-01-02 PROCEDURE — 83550 IRON BINDING TEST: CPT | Performed by: INTERNAL MEDICINE

## 2025-01-02 PROCEDURE — 82607 VITAMIN B-12: CPT | Performed by: INTERNAL MEDICINE

## 2025-01-02 PROCEDURE — 82746 ASSAY OF FOLIC ACID SERUM: CPT | Performed by: INTERNAL MEDICINE

## 2025-01-14 ENCOUNTER — TELEPHONE (OUTPATIENT)
Dept: HEPATOLOGY | Facility: CLINIC | Age: 45
End: 2025-01-14
Payer: MEDICAID

## 2025-01-14 NOTE — TELEPHONE ENCOUNTER
Called pt to scheduled her F/u appointment but went straight to voicemail. Voicemail is full can't leave a message.

## 2025-01-24 ENCOUNTER — TELEPHONE (OUTPATIENT)
Dept: HEPATOLOGY | Facility: CLINIC | Age: 45
End: 2025-01-24
Payer: MEDICAID

## 2025-01-24 NOTE — TELEPHONE ENCOUNTER
"----- Message from Med Assistant Buchanan sent at 1/24/2025 11:10 AM CST -----  Regarding: FW: call back  Ms. Gifford would like to have all test orders faxed over before visiting w/Dr. Cabral ().   An appointment with Dr. Ceci Cabral has been scheduled on has been scheduled on Thursday, April 3, 2025 at 10:30AM.    She also wants Dr. Cabral  to know that patient has bee moved to [facility] on January 9[facility] and simply stays in the bed.  The patient claims that turning causes pain in various part  her places  of her body.   Are there any treatments the facility should be doing at this time?  Please advise.     YAA Buchanan  [facility]  ----- Message -----  From: Gonsalo Gaines  Sent: 1/22/2025   9:25 AM CST  To: Misha Girard Staff  Subject: call back                                        Consult/Advisory:        Name Of Caller: Ирина with [facility]     Contact Preference?:[phone]     What is the nature of the call?: Calling to speak w/ someone in regards to getting an appt close to where she at requesting call back       Additional Notes:  "Thank you for all that you do for our patients"  "

## 2025-01-24 NOTE — TELEPHONE ENCOUNTER
Spoke with  regarding faxing lab order to [facility] rehab to get it scheduled prior pt visit in April 3rd.

## 2025-01-24 NOTE — TELEPHONE ENCOUNTER
"Spoke w/Kendall () Ирина was unavailable and she will give her the message to return the call.        ----- Message from Gonsalo sent at 1/22/2025  9:22 AM CST -----  Regarding: call back  Consult/Advisory:        Name Of Caller: Ирина with [facility]     Contact Preference?:[phone]     What is the nature of the call?: Calling to speak w/ someone in regards to getting an appt close to where she at requesting call back       Additional Notes:  "Thank you for all that you do for our patients"  "

## 2025-02-05 NOTE — PROGRESS NOTES
Please see the attached refill request.   [facility] - Telemetry [facility] Medicine  Progress Note    Patient Name: Rachel Zazueta  MRN: [id number]  Patient Class: IP- Inpatient   Admission Date: 6/13/2023  Length of Stay: 1 days  Attending Physician: Jessica Boyer MD  Primary Care Provider: Dannielle Merino DO        Subjective:     Principal Problem:UTI (urinary tract infection)        HPI:  43-year-old female with a history of HCV, IV drug use, Cirrhosis, Nephrolithiasis, Spinal fusion in 2021, Epidural abscess s/p hardware removal in 2022, and neurogenic bladder presents as a transfer from [facility] ED after she presented on 06/11 with worsening lower extremity edema, swelling and pain. Patient is wheelchair-bound. In addition, she also complains of chronic back pain and skin breakdown in her sacrum and lower extremities. She reports having taken a muscle relaxant and Suboxone at home with no relief. She denies any fever, chills, nausea, vomiting, hematemesis, cough, chest pain, palpitations, shortness of breath, abdominal pain/distension, changes in bowel or urinary habits, no hematochezia or melena. In the ED,she was noted have borderline low blood pressure and tachycardic on arrival. She received 2L of IV luids and started on Ceftriaxone. Patient was than transferred for further care    Of note she was previously admitted here at [facility] on January 19-February 27 with ESBL E coli bacteremia and thoracic diskitis s/p thoracic fusion. She had significant ascites requiring several paracenteses and was then discharged to LTAC. The following month she presented to [facility] 3/13-4/1/2023 with worsening ascites and cirrhosis. She underwent TIPS on 3/23. At the time she had developed Anasarca and worsening pancytopenia.       Overview/Hospital Course:  Patient diuresing well since being placed on Lasix b.i.d., infectious Disease met with patient and switch patient to ertapenem for meropenem.  Urine culture growing ESBL  Klebsiella       Interval History:  No acute events overnight, patient endorsing persistent back pain the reported as slightly improved.  Reports that her right thigh pain has improved.  Urine culture growing ESBL Klebsiella. PureWick urine appearing clear    Review of Systems   Constitutional:  Negative for chills and fever.   HENT:  Negative for congestion and sore throat.    Eyes:  Negative for photophobia and visual disturbance.   Respiratory:  Negative for cough and shortness of breath.    Cardiovascular:  Positive for leg swelling. Negative for chest pain and palpitations.   Gastrointestinal:  Negative for abdominal pain, constipation, diarrhea, nausea and vomiting.   Endocrine: Negative for cold intolerance and heat intolerance.   Genitourinary:  Negative for dysuria and hematuria.   Musculoskeletal:  Positive for arthralgias and back pain. Negative for myalgias.   Skin:  Negative for rash.   Allergic/Immunologic: Negative for environmental allergies and food allergies.   Neurological:  Negative for dizziness, seizures, syncope and headaches.   Hematological:  Negative for adenopathy. Does not bruise/bleed easily.   Psychiatric/Behavioral:  Negative for hallucinations and suicidal ideas.    Objective:     Vital Signs (Most Recent):  Temp: 97.8 °F (36.6 °C) (06/14/23 1112)  Pulse: 107 (06/14/23 1144)  Resp: 18 (06/14/23 1112)  BP: (!) 107/53 (06/14/23 1112)  SpO2: 100 % (06/14/23 1112) Vital Signs (24h Range):  Temp:  [97.6 °F (36.4 °C)-98.6 °F (37 °C)] 97.8 °F (36.6 °C)  Pulse:  [105-115] 107  Resp:  [16-18] 18  SpO2:  [97 %-100 %] 100 %  BP: (104-122)/(53-68) 107/53     Weight: 128.9 kg (284 lb 2.8 oz)  Body mass index is 44.51 kg/m².    Intake/Output Summary (Last 24 hours) at 6/14/2023 1335  Last data filed at 6/14/2023 1045  Gross per 24 hour   Intake 240 ml   Output 3550 ml   Net -3310 ml         Physical Exam  Constitutional:       Appearance: She is well-developed. She is obese. She is ill-appearing.    HENT:      Head: Normocephalic and atraumatic.   Eyes:      General: No scleral icterus.     Conjunctiva/sclera: Conjunctivae normal.      Pupils: Pupils are equal, round, and reactive to light.   Neck:      Thyroid: No thyromegaly.      Vascular: No JVD.   Cardiovascular:      Rate and Rhythm: Normal rate and regular rhythm.      Heart sounds: Normal heart sounds. No murmur heard.    No friction rub. No gallop.   Pulmonary:      Effort: Pulmonary effort is normal.      Breath sounds: Normal breath sounds. No wheezing or rales.   Abdominal:      General: Bowel sounds are normal. There is no distension.      Palpations: Abdomen is soft.      Tenderness: There is no abdominal tenderness. There is no guarding or rebound.   Musculoskeletal:         General: No tenderness. Normal range of motion.      Cervical back: Neck supple.      Right lower leg: Edema present.      Left lower leg: Edema present.   Skin:     General: Skin is warm and dry.      Coloration: Skin is not jaundiced.      Comments: Right thigh erythema improving, less indurated on 06/14.  Upon examination of patient's back, point tenderness appreciated along paraspinal muscles of thoracic spine, however no cellulitic changes appreciated.  Furthermore, no open ulcers appreciated.   Neurological:      Mental Status: She is alert and oriented to person, place, and time.      Cranial Nerves: No cranial nerve deficit.   Psychiatric:         Behavior: Behavior normal.           Significant Labs: All pertinent labs within the past 24 hours have been reviewed.  CBC:   Recent Labs   Lab 06/14/23  0428   WBC 2.40*   HGB 8.0*   HCT 26.1*   PLT 86*     CMP:   Recent Labs   Lab 06/13/23  0554 06/14/23  0428   * 135*   K 4.3 3.7    102   CO2 21* 29   GLU 88 90   BUN 6 5*   CREATININE 0.5 0.5   CALCIUM 7.8* 7.6*   PROT 6.2 6.4   ALBUMIN 1.6* 1.6*   BILITOT 2.1* 2.1*   ALKPHOS 119 131   AST 50* 39   ALT 22 21   ANIONGAP 5* 4*       Significant Imaging: I  have reviewed all pertinent imaging results/findings within the past 24 hours.      Assessment/Plan:      * UTI (urinary tract infection)  Patient growing ESBL Klebsiella. Urine that has been collected no longer cloudy. CT AP: Left renal non-obstructing calculi; no evidence of Pyelonephritis  - ID consult given near sandro is recommended switching the ertapenem.  Will treat for 3 days.    Anxiety and depression  - Resume Home medication    Debility  - PT/OT consult    Chronic midline low back pain with sciatica  Patient with chronic back pain.  Reports worsening with accumulation of her anasarca.  Tried taking her home Suboxone which she said was left over from her previous attempts at treatment.  Suboxone did not help.  She states that she ran out of her opiates issues prescribed after she is discharged from LTAC  - no ulcerations or purulent skin changes noted to her back on exam  - Continue with pain control; if worsens consider NSGY evaluation and repeat imaging    Substance use disorder  Urine tox: Positive for Amphetamine and Benzo  - Neuro checks q6h  - Fall/Seizure and Aspiration precauations      Chronic hepatitis C with cirrhosis  - See Cirrhosis  - patient has not received any treatment yet  - hcv rna positive as of February 2023    Cirrhosis of liver with ascites  MELD-Na: 17 at 6/14/2023  4:28 AM  MELD: 15 at 6/14/2023  4:28 AM  Calculated from:  Serum Creatinine: 0.5 mg/dL (Using min of 1 mg/dL) at 6/14/2023  4:28 AM  Serum Sodium: 135 mmol/L at 6/14/2023  4:28 AM  Total Bilirubin: 2.1 mg/dL at 6/14/2023  4:28 AM  INR(ratio): 1.7 at 6/14/2023  4:28 AM  Majority of mild common from elevated bilirubin and INR.  Patient's renal functions normal.  - Lactulose 20 mg, TID  - continue IV Lasix 40 b.i.d. and start Aldactone 100 mg, daily  - CT A/P:  Cirrhosis with splenomegaly and upper abdominal varices.  There is a right tips stent. small amount of abdominal ascites seen within the left lower quadrant of the  abdomen. Anasarca      VTE Risk Mitigation (From admission, onward)         Ordered     enoxaparin injection 40 mg  Every 12 hours         06/13/23 0740     IP VTE HIGH RISK PATIENT  Once         06/13/23 0223     Place sequential compression device  Until discontinued         06/13/23 0145                Discharge Planning   SHIRA: 6/15/2023     Code Status: Full Code   Is the patient medically ready for discharge?: No    Reason for patient still in hospital (select all that apply): Patient trending condition                     Jessica Boyer MD  Department of Hospital Medicine   [facility] - Telemetry Stepdown

## 2025-02-07 NOTE — ASSESSMENT & PLAN NOTE
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    Patient underwent L3-4 intervertebral disc biopsy on 2/15. She is scheduled for surgical intervention on Monday.  Patient started on empiric Vancomycin and Ceftriaxone. IR cultures and blood cx are NGTD. Afebrile. HDS.    Recommendations  · Continue empiric Vancomycin and Ceftriaxone  · Inpatient pharmacy managing Vanc dosing. Vanc trough goal 15-20 (trough due this a.m.)  · Follow IR cultures to guide antibiotics   · When taken to the OR, please send tissue/bone for pathology, gram stain, aerobic, anaerobic, AFB and fungal cultures   · Anticipate 8 weeks of IV antibiotics from day of surgery  · Discussed antibiotic plan with ID staff. ID will follow   [facility] [phone].  Called and made a follow up office visit from a  SC trauma fall that Dr. Roman was consulted on.

## 2025-02-24 ENCOUNTER — TELEPHONE (OUTPATIENT)
Facility: CLINIC | Age: 45
End: 2025-02-24
Payer: MEDICAID

## 2025-02-24 NOTE — TELEPHONE ENCOUNTER
"----- Message from Gonsalo sent at 2/24/2025  1:37 PM CST -----  Regarding: call back  Consult/Advisory:  Name Of Caller: Ms. Gifford  [facility] Contact Preference?:[phone] What is the nature of the call?: Calling to speak w/   Zaedy in regards to the pt's appt requesting call back  Additional Notes:"Thank you for all that you do for our patients"  "

## 2025-02-24 NOTE — TELEPHONE ENCOUNTER
I tried to call [facility] twice today to return a call from Ms. Ирина regarding pt Rachel Zazueta but I can't get a hold of her. I left a voicemail for Ms. Ирина to call back.

## 2025-07-14 NOTE — PLAN OF CARE
CMICU DAILY GOALS       A: Awake    RASS: Goal - RASS Goal: 0-->alert and calm  Actual - RASS (Larson Agitation-Sedation Scale): 0-->alert and calm   Restraint necessity:    B: Breathe   SBT: Not intubated   C: Coordinate A & B, analgesics/sedatives   Pain: managed    SAT: Not intubated  D: Delirium   CAM-ICU: Overall CAM-ICU: Negative  E: Early(intubated/ Progressive (non-intubated) Mobility   MOVE Screen: Pass   Activity: Activity Management: Arm raise - L1  FAS: Feeding/Nutrition   Diet order: Diet/Nutrition Received: regular,    T: Thrombus   DVT prophylaxis: VTE Required Core Measure: Pharmacological prophylaxis initiated/maintained  H: HOB Elevation   Head of Bed (HOB) Positioning: HOB at 30-45 degrees  U: Ulcer Prophylaxis   GI: yes  G: Glucose control   managed    S: Skin   Bathing/Skin Care: incontinence care, linen changed, bath, complete  Device Skin Pressure Protection: absorbent pad utilized/changed  Pressure Reduction Devices: pressure-redistributing mattress utilized  Pressure Reduction Techniques: weight shift assistance provided, frequent weight shift encouraged  Skin Protection: adhesive use limited, skin-to-skin areas padded, skin-to-device areas padded  B: Bowel Function   constipation   I: Indwelling Catheters   Jones necessity: [REMOVED]      Urethral Catheter 02/21/22 1415 Non-latex;Straight-tip 16 Fr.-Reason for Continuing Urinary Catheterization: Post operative  [REMOVED]      Urethral Catheter 03/02/22 0910 Straight-tip;Silicone 16 Fr.-Reason for Continuing Urinary Catheterization: Post operative   CVC necessity: No  D: De-escalation Antibiotics   No    Family/Goals of care/Code Status   Code Status: Full Code    24H Vital Sign Range  Temp:  [97.9 °F (36.6 °C)-98.4 °F (36.9 °C)]   Pulse:  []   Resp:  [9-27]   BP: (109-151)/(55-85)   SpO2:  [96 %-100 %]   Arterial Line BP: ()/()      Shift Events   No acute events throughout shift; patient to transfer to step down  [facility] kush.   unit    VS and assessment per flow sheet, patient progressing towards goals as tolerated, plan of care reviewed with  patient , concerns addressed, will continue to monitor.    Magalis Mandel     [place] [id number]   Dictated by (CST): Polo Rockwell [id number]   Dictated by (CST): Polo Rockwell [id number]   Dictated by (CST): Amor Quinones Esteban Damico MD, Neurology  [facility]  Pager [phone]  

## (undated) DEVICE — SEALANT ADHERUS AUTOSPRY DURAL

## (undated) DEVICE — BUR BONE CUT MICRO TPS 3X3.8MM

## (undated) DEVICE — SEE MEDLINE ITEM 156905

## (undated) DEVICE — GRAFT ALTAPORE LARGE CYL 8ML: Type: IMPLANTABLE DEVICE | Site: BACK | Status: NON-FUNCTIONAL

## (undated) DEVICE — SEE MEDLINE ITEM 157131

## (undated) DEVICE — SUT STRATAFIX PDS PLUS VIO

## (undated) DEVICE — STAPLER SKIN PROXIMATE WIDE

## (undated) DEVICE — CANISTER INFOV.A.C WOUND 500ML

## (undated) DEVICE — COVER BACK TABLE 72X21

## (undated) DEVICE — SUT VICRYL+ 1 CT1 18IN

## (undated) DEVICE — SYR IRRIGATION BULB STER 60ML

## (undated) DEVICE — DRAPE TOP 53X102IN

## (undated) DEVICE — TAP 8MM SPINAL POWER

## (undated) DEVICE — SPONGE GAUZE 16PLY 4X4

## (undated) DEVICE — DRESSING SURGICAL 1/2X1/2

## (undated) DEVICE — DRESSING AQUACEL FOAM 5 X 5

## (undated) DEVICE — CHLORAPREP W TINT 26ML APPL

## (undated) DEVICE — DRESSING AQUACEL SACRAL 9 X 9

## (undated) DEVICE — ELECTRODE REM PLYHSV RETURN 9

## (undated) DEVICE — DRAPE C-ARM ELAS CLIP 42X120IN

## (undated) DEVICE — BLADE 4IN EDGE INSULATED

## (undated) DEVICE — SUT SILK 3-0 BLK BR SH 30IN

## (undated) DEVICE — MARKER SKIN STND TIP BLUE BARR

## (undated) DEVICE — FRAZIER 18FR

## (undated) DEVICE — IMPLANTABLE DEVICE
Type: IMPLANTABLE DEVICE | Site: BACK | Status: NON-FUNCTIONAL
Removed: 2022-03-02

## (undated) DEVICE — KIT EVACUATOR 3-SPRING 1/8 DRN

## (undated) DEVICE — DRESSING TRANS 4X4 TEGADERM

## (undated) DEVICE — SPHERE MARKER REFLECTIVE DISP

## (undated) DEVICE — DRESSING AQUACEL FOAM 3 X 3

## (undated) DEVICE — SEE MEDLINE ITEM 157150

## (undated) DEVICE — BALLTIP STIMULATING PROBE

## (undated) DEVICE — TUBE FRAZIER 5MM 2FT SOFT TIP

## (undated) DEVICE — CORD BIPOLAR 12 FOOT

## (undated) DEVICE — Device

## (undated) DEVICE — CANNULA EXPEDIUM OPN 16GX160MM

## (undated) DEVICE — SCREW BONE EF 30MM 4X100MM SS
Type: IMPLANTABLE DEVICE | Site: BACK | Status: NON-FUNCTIONAL
Removed: 2022-03-02

## (undated) DEVICE — KIT IRR SUCTION HND PIECE

## (undated) DEVICE — COVER BACK TBL HD 2-TIER 72IN

## (undated) DEVICE — DRAPE STERI-DRAPE 1000 17X11IN

## (undated) DEVICE — KIT PREVENA PLUS

## (undated) DEVICE — GAUZE SPONGE 4X4 12PLY

## (undated) DEVICE — SEALER AQUAMANTYS 2.3 BIPOLAR

## (undated) DEVICE — TAP POWER 6MM DOUBLE LEAD

## (undated) DEVICE — TRAY FOLEY 16FR INFECTION CONT

## (undated) DEVICE — TAP EXPEDIUM 5.5X7MM 30-70MM

## (undated) DEVICE — DRESSING MEPILEX BORDER 4 X 4

## (undated) DEVICE — DRAPE INCISE IOBAN 2 23X17IN

## (undated) DEVICE — TAP 5MM SPINAL POWER

## (undated) DEVICE — SEE MEDLINE ITEM 146347

## (undated) DEVICE — NDL SPINAL 18GX3.5 SPINOCAN

## (undated) DEVICE — SPONGE LAP 4X18 PREWASHED

## (undated) DEVICE — DRAPE STERI INSTRUMENT 1018

## (undated) DEVICE — SUT CTD VICRYL 2-0 CR/CT-2

## (undated) DEVICE — DRAPE ABDOMINAL TIBURON 14X11

## (undated) DEVICE — DRAPE C-ARMOR EQUIPMENT COVER

## (undated) DEVICE — VAC WOUND ULTRA THERAPY

## (undated) DEVICE — KIT SURGIFLO HEMOSTATIC MATRIX

## (undated) DEVICE — KIT SPINAL PATIENT CARE JACK

## (undated) DEVICE — DRESSING AQUACEL FOAM 4 X 12

## (undated) DEVICE — VAC WOUND DISPOSABLE PREVENA

## (undated) DEVICE — SPONGE NEURO 1/4X1/4

## (undated) DEVICE — TRAY CATH FOL SIL URIMTR 16FR

## (undated) DEVICE — HEMOSTAT SURGICEL NU-KNIT 6X9

## (undated) DEVICE — KIT CONFIDENCE W/O NEEDLES

## (undated) DEVICE — DRAPE C ARM 42 X 120 10/BX

## (undated) DEVICE — CAUTERY BOVIE PENCIL

## (undated) DEVICE — CLIP MED TICALL

## (undated) DEVICE — ROUTER TAPERED 2.3MM

## (undated) DEVICE — DRAPE THREE-QTR REINF 53X77IN

## (undated) DEVICE — BURR ROUND PRECISION 7.5MM

## (undated) DEVICE — HEMOSTAT SURGICEL 4X8IN